# Patient Record
Sex: FEMALE | Race: WHITE | NOT HISPANIC OR LATINO | Employment: OTHER | URBAN - METROPOLITAN AREA
[De-identification: names, ages, dates, MRNs, and addresses within clinical notes are randomized per-mention and may not be internally consistent; named-entity substitution may affect disease eponyms.]

---

## 2021-11-29 ENCOUNTER — HOSPITAL ENCOUNTER (INPATIENT)
Facility: HOSPITAL | Age: 56
LOS: 18 days | Discharge: HOME | End: 2021-12-17
Attending: EMERGENCY MEDICINE | Admitting: INTERNAL MEDICINE

## 2021-11-29 ENCOUNTER — APPOINTMENT (OUTPATIENT)
Dept: GENERAL RADIOLOGY | Facility: HOSPITAL | Age: 56
End: 2021-11-29

## 2021-11-29 DIAGNOSIS — D64.9 ANEMIA, UNSPECIFIED TYPE: ICD-10-CM

## 2021-11-29 DIAGNOSIS — Z74.09 IMPAIRED MOBILITY AND ACTIVITIES OF DAILY LIVING: ICD-10-CM

## 2021-11-29 DIAGNOSIS — I10 UNCONTROLLED HYPERTENSION: ICD-10-CM

## 2021-11-29 DIAGNOSIS — R77.8 ELEVATED TROPONIN: ICD-10-CM

## 2021-11-29 DIAGNOSIS — R53.1 WEAKNESS: ICD-10-CM

## 2021-11-29 DIAGNOSIS — Z78.9 IMPAIRED MOBILITY AND ACTIVITIES OF DAILY LIVING: ICD-10-CM

## 2021-11-29 DIAGNOSIS — N17.9 ACUTE RENAL FAILURE, UNSPECIFIED ACUTE RENAL FAILURE TYPE (HCC): Primary | ICD-10-CM

## 2021-11-29 DIAGNOSIS — N18.6 ESRD (END STAGE RENAL DISEASE) (HCC): ICD-10-CM

## 2021-11-29 PROBLEM — E03.9 HYPOTHYROIDISM: Status: ACTIVE | Noted: 2021-11-29

## 2021-11-29 PROBLEM — M06.9 RHEUMATOID ARTHRITIS (HCC): Status: ACTIVE | Noted: 2021-11-29

## 2021-11-29 PROBLEM — E11.9 TYPE 2 DIABETES MELLITUS (HCC): Status: ACTIVE | Noted: 2021-11-29

## 2021-11-29 LAB
ALBUMIN SERPL-MCNC: 2.7 G/DL (ref 3.5–5.2)
ALBUMIN/GLOB SERPL: 1 G/DL
ALP SERPL-CCNC: 88 U/L (ref 39–117)
ALT SERPL W P-5'-P-CCNC: 12 U/L (ref 1–33)
ANION GAP SERPL CALCULATED.3IONS-SCNC: 10.9 MMOL/L (ref 5–15)
AST SERPL-CCNC: 23 U/L (ref 1–32)
BASOPHILS # BLD AUTO: 0.03 10*3/MM3 (ref 0–0.2)
BASOPHILS NFR BLD AUTO: 0.3 % (ref 0–1.5)
BILIRUB SERPL-MCNC: 0.4 MG/DL (ref 0–1.2)
BUN SERPL-MCNC: 40 MG/DL (ref 6–20)
BUN/CREAT SERPL: 9.5 (ref 7–25)
CALCIUM SPEC-SCNC: 7.9 MG/DL (ref 8.6–10.5)
CHLORIDE SERPL-SCNC: 97 MMOL/L (ref 98–107)
CO2 SERPL-SCNC: 24.1 MMOL/L (ref 22–29)
CREAT SERPL-MCNC: 4.22 MG/DL (ref 0.57–1)
DEPRECATED RDW RBC AUTO: 47.4 FL (ref 37–54)
EOSINOPHIL # BLD AUTO: 0.04 10*3/MM3 (ref 0–0.4)
EOSINOPHIL NFR BLD AUTO: 0.5 % (ref 0.3–6.2)
ERYTHROCYTE [DISTWIDTH] IN BLOOD BY AUTOMATED COUNT: 15.9 % (ref 12.3–15.4)
GFR SERPL CREATININE-BSD FRML MDRD: 11 ML/MIN/1.73
GFR SERPL CREATININE-BSD FRML MDRD: ABNORMAL ML/MIN/{1.73_M2}
GLOBULIN UR ELPH-MCNC: 2.7 GM/DL
GLUCOSE BLDC GLUCOMTR-MCNC: 285 MG/DL (ref 70–130)
GLUCOSE SERPL-MCNC: 292 MG/DL (ref 65–99)
HCT VFR BLD AUTO: 25.7 % (ref 34–46.6)
HGB BLD-MCNC: 8.7 G/DL (ref 12–15.9)
IMM GRANULOCYTES # BLD AUTO: 0.05 10*3/MM3 (ref 0–0.05)
IMM GRANULOCYTES NFR BLD AUTO: 0.6 % (ref 0–0.5)
LYMPHOCYTES # BLD AUTO: 1.13 10*3/MM3 (ref 0.7–3.1)
LYMPHOCYTES NFR BLD AUTO: 12.7 % (ref 19.6–45.3)
MCH RBC QN AUTO: 27.7 PG (ref 26.6–33)
MCHC RBC AUTO-ENTMCNC: 33.9 G/DL (ref 31.5–35.7)
MCV RBC AUTO: 81.8 FL (ref 79–97)
MONOCYTES # BLD AUTO: 0.35 10*3/MM3 (ref 0.1–0.9)
MONOCYTES NFR BLD AUTO: 3.9 % (ref 5–12)
NEUTROPHILS NFR BLD AUTO: 7.28 10*3/MM3 (ref 1.7–7)
NEUTROPHILS NFR BLD AUTO: 82 % (ref 42.7–76)
NRBC BLD AUTO-RTO: 0 /100 WBC (ref 0–0.2)
NT-PROBNP SERPL-MCNC: 5443 PG/ML (ref 0–900)
PLATELET # BLD AUTO: 167 10*3/MM3 (ref 140–450)
PMV BLD AUTO: 10.1 FL (ref 6–12)
POTASSIUM SERPL-SCNC: 5 MMOL/L (ref 3.5–5.2)
PROT SERPL-MCNC: 5.4 G/DL (ref 6–8.5)
RBC # BLD AUTO: 3.14 10*6/MM3 (ref 3.77–5.28)
SODIUM SERPL-SCNC: 132 MMOL/L (ref 136–145)
TROPONIN T SERPL-MCNC: 0.13 NG/ML (ref 0–0.03)
WBC NRBC COR # BLD: 8.88 10*3/MM3 (ref 3.4–10.8)

## 2021-11-29 PROCEDURE — 99285 EMERGENCY DEPT VISIT HI MDM: CPT

## 2021-11-29 PROCEDURE — 36415 COLL VENOUS BLD VENIPUNCTURE: CPT

## 2021-11-29 PROCEDURE — 83880 ASSAY OF NATRIURETIC PEPTIDE: CPT | Performed by: EMERGENCY MEDICINE

## 2021-11-29 PROCEDURE — 80053 COMPREHEN METABOLIC PANEL: CPT | Performed by: EMERGENCY MEDICINE

## 2021-11-29 PROCEDURE — 82962 GLUCOSE BLOOD TEST: CPT

## 2021-11-29 PROCEDURE — 85025 COMPLETE CBC W/AUTO DIFF WBC: CPT | Performed by: EMERGENCY MEDICINE

## 2021-11-29 PROCEDURE — 93005 ELECTROCARDIOGRAM TRACING: CPT | Performed by: EMERGENCY MEDICINE

## 2021-11-29 PROCEDURE — 84484 ASSAY OF TROPONIN QUANT: CPT | Performed by: EMERGENCY MEDICINE

## 2021-11-29 PROCEDURE — 93010 ELECTROCARDIOGRAM REPORT: CPT | Performed by: INTERNAL MEDICINE

## 2021-11-29 PROCEDURE — U0004 COV-19 TEST NON-CDC HGH THRU: HCPCS | Performed by: EMERGENCY MEDICINE

## 2021-11-29 PROCEDURE — 71045 X-RAY EXAM CHEST 1 VIEW: CPT

## 2021-11-29 RX ORDER — BLOOD SUGAR DIAGNOSTIC
1 STRIP MISCELLANEOUS 4 TIMES DAILY
COMMUNITY
Start: 2021-09-22 | End: 2022-01-15 | Stop reason: HOSPADM

## 2021-11-29 RX ORDER — ESCITALOPRAM OXALATE 20 MG/1
20 TABLET ORAL DAILY
Status: ON HOLD | COMMUNITY
End: 2021-12-10

## 2021-11-29 RX ORDER — BLOOD SUGAR DIAGNOSTIC
STRIP MISCELLANEOUS 4 TIMES DAILY
COMMUNITY
Start: 2021-09-10 | End: 2022-01-15 | Stop reason: HOSPADM

## 2021-11-29 RX ORDER — LABETALOL HYDROCHLORIDE 5 MG/ML
10 INJECTION, SOLUTION INTRAVENOUS ONCE
Status: COMPLETED | OUTPATIENT
Start: 2021-11-29 | End: 2021-11-29

## 2021-11-29 RX ORDER — LEVOTHYROXINE SODIUM 175 UG/1
175 TABLET ORAL DAILY
Status: ON HOLD | COMMUNITY
End: 2021-12-17 | Stop reason: SDUPTHER

## 2021-11-29 RX ORDER — SERTRALINE HYDROCHLORIDE 100 MG/1
100 TABLET, FILM COATED ORAL DAILY
Status: ON HOLD | COMMUNITY
End: 2021-12-17 | Stop reason: SDUPTHER

## 2021-11-29 RX ORDER — SODIUM CHLORIDE 0.9 % (FLUSH) 0.9 %
10 SYRINGE (ML) INJECTION AS NEEDED
Status: DISCONTINUED | OUTPATIENT
Start: 2021-11-29 | End: 2021-12-03

## 2021-11-29 RX ORDER — LOSARTAN POTASSIUM 50 MG/1
50 TABLET ORAL DAILY
COMMUNITY
Start: 2021-10-20 | End: 2021-12-17 | Stop reason: HOSPADM

## 2021-11-29 RX ORDER — TEMAZEPAM 7.5 MG/1
7.5 CAPSULE ORAL
COMMUNITY
End: 2021-12-17 | Stop reason: HOSPADM

## 2021-11-29 RX ORDER — ROPINIROLE 0.25 MG/1
0.75 TABLET, FILM COATED ORAL
COMMUNITY
Start: 2021-09-10 | End: 2022-09-13 | Stop reason: HOSPADM

## 2021-11-29 RX ORDER — ASPIRIN 81 MG/1
81 TABLET ORAL DAILY
Status: ON HOLD | COMMUNITY
End: 2022-07-01 | Stop reason: SDUPTHER

## 2021-11-29 RX ORDER — PANTOPRAZOLE SODIUM 40 MG/1
40 TABLET, DELAYED RELEASE ORAL DAILY
COMMUNITY
Start: 2021-08-25 | End: 2022-02-21

## 2021-11-29 RX ADMIN — LABETALOL HYDROCHLORIDE 10 MG: 5 INJECTION, SOLUTION INTRAVENOUS at 22:40

## 2021-11-30 ENCOUNTER — APPOINTMENT (OUTPATIENT)
Dept: ULTRASOUND IMAGING | Facility: HOSPITAL | Age: 56
End: 2021-11-30

## 2021-11-30 ENCOUNTER — APPOINTMENT (OUTPATIENT)
Dept: CARDIOLOGY | Facility: HOSPITAL | Age: 56
End: 2021-11-30

## 2021-11-30 PROBLEM — D64.9 ANEMIA: Status: ACTIVE | Noted: 2021-11-30

## 2021-11-30 PROBLEM — T78.3XXA ANGIOEDEMA: Status: ACTIVE | Noted: 2021-11-30

## 2021-11-30 PROBLEM — K21.9 GERD (GASTROESOPHAGEAL REFLUX DISEASE): Status: ACTIVE | Noted: 2021-11-30

## 2021-11-30 PROBLEM — I16.0 HYPERTENSIVE URGENCY: Status: ACTIVE | Noted: 2021-11-30

## 2021-11-30 PROBLEM — I16.0 HYPERTENSIVE URGENCY: Status: RESOLVED | Noted: 2021-11-30 | Resolved: 2021-11-30

## 2021-11-30 LAB
ABO GROUP BLD: NORMAL
ANION GAP SERPL CALCULATED.3IONS-SCNC: 8.8 MMOL/L (ref 5–15)
AORTIC DIMENSIONLESS INDEX: 0.8 (DI)
BH CV ECHO MEAS - AO MAX PG (FULL): 4.4 MMHG
BH CV ECHO MEAS - AO MAX PG: 7.7 MMHG
BH CV ECHO MEAS - AO MEAN PG (FULL): 1.8 MMHG
BH CV ECHO MEAS - AO MEAN PG: 3.6 MMHG
BH CV ECHO MEAS - AO V2 MAX: 138.5 CM/SEC
BH CV ECHO MEAS - AO V2 MEAN: 88.1 CM/SEC
BH CV ECHO MEAS - AO V2 VTI: 26.7 CM
BH CV ECHO MEAS - AVA(I,A): 1.7 CM^2
BH CV ECHO MEAS - AVA(I,D): 1.7 CM^2
BH CV ECHO MEAS - AVA(V,A): 1.4 CM^2
BH CV ECHO MEAS - AVA(V,D): 1.4 CM^2
BH CV ECHO MEAS - BSA(HAYCOCK): 1.6 M^2
BH CV ECHO MEAS - BSA: 1.6 M^2
BH CV ECHO MEAS - BZI_BMI: 23.8 KILOGRAMS/M^2
BH CV ECHO MEAS - BZI_METRIC_HEIGHT: 157.5 CM
BH CV ECHO MEAS - BZI_METRIC_WEIGHT: 59 KG
BH CV ECHO MEAS - EDV(CUBED): 45.6 ML
BH CV ECHO MEAS - EDV(MOD-SP2): 93 ML
BH CV ECHO MEAS - EDV(MOD-SP4): 79 ML
BH CV ECHO MEAS - EDV(TEICH): 53.4 ML
BH CV ECHO MEAS - EF(CUBED): 63.9 %
BH CV ECHO MEAS - EF(MOD-BP): 61.8 %
BH CV ECHO MEAS - EF(MOD-SP2): 60.2 %
BH CV ECHO MEAS - EF(MOD-SP4): 60.8 %
BH CV ECHO MEAS - EF(TEICH): 56.3 %
BH CV ECHO MEAS - ESV(CUBED): 16.5 ML
BH CV ECHO MEAS - ESV(MOD-SP2): 37 ML
BH CV ECHO MEAS - ESV(MOD-SP4): 31 ML
BH CV ECHO MEAS - ESV(TEICH): 23.3 ML
BH CV ECHO MEAS - FS: 28.8 %
BH CV ECHO MEAS - IVS/LVPW: 0.87
BH CV ECHO MEAS - IVSD: 0.93 CM
BH CV ECHO MEAS - LAT PEAK E' VEL: 4.2 CM/SEC
BH CV ECHO MEAS - LV DIASTOLIC VOL/BSA (35-75): 49.6 ML/M^2
BH CV ECHO MEAS - LV MASS(C)D: 106 GRAMS
BH CV ECHO MEAS - LV MASS(C)DI: 66.6 GRAMS/M^2
BH CV ECHO MEAS - LV MAX PG: 3.2 MMHG
BH CV ECHO MEAS - LV MEAN PG: 1.8 MMHG
BH CV ECHO MEAS - LV SYSTOLIC VOL/BSA (12-30): 19.5 ML/M^2
BH CV ECHO MEAS - LV V1 MAX: 90 CM/SEC
BH CV ECHO MEAS - LV V1 MEAN: 64 CM/SEC
BH CV ECHO MEAS - LV V1 VTI: 21.6 CM
BH CV ECHO MEAS - LVIDD: 3.6 CM
BH CV ECHO MEAS - LVIDS: 2.5 CM
BH CV ECHO MEAS - LVLD AP2: 7.2 CM
BH CV ECHO MEAS - LVLD AP4: 6.6 CM
BH CV ECHO MEAS - LVLS AP2: 6 CM
BH CV ECHO MEAS - LVLS AP4: 5.8 CM
BH CV ECHO MEAS - LVOT AREA (M): 2 CM^2
BH CV ECHO MEAS - LVOT AREA: 2.1 CM^2
BH CV ECHO MEAS - LVOT DIAM: 1.6 CM
BH CV ECHO MEAS - LVPWD: 1.1 CM
BH CV ECHO MEAS - MED PEAK E' VEL: 3.7 CM/SEC
BH CV ECHO MEAS - MV A DUR: 0.12 SEC
BH CV ECHO MEAS - MV A MAX VEL: 100.3 CM/SEC
BH CV ECHO MEAS - MV DEC SLOPE: 336.1 CM/SEC^2
BH CV ECHO MEAS - MV DEC TIME: 210 SEC
BH CV ECHO MEAS - MV E MAX VEL: 60.8 CM/SEC
BH CV ECHO MEAS - MV E/A: 0.61
BH CV ECHO MEAS - MV MAX PG: 4.5 MMHG
BH CV ECHO MEAS - MV MEAN PG: 1.6 MMHG
BH CV ECHO MEAS - MV P1/2T MAX VEL: 76 CM/SEC
BH CV ECHO MEAS - MV P1/2T: 66.2 MSEC
BH CV ECHO MEAS - MV V2 MAX: 105.9 CM/SEC
BH CV ECHO MEAS - MV V2 MEAN: 58.6 CM/SEC
BH CV ECHO MEAS - MV V2 VTI: 28.5 CM
BH CV ECHO MEAS - MVA P1/2T LCG: 2.9 CM^2
BH CV ECHO MEAS - MVA(P1/2T): 3.3 CM^2
BH CV ECHO MEAS - MVA(VTI): 1.6 CM^2
BH CV ECHO MEAS - PULM A REVS DUR: 0.14 SEC
BH CV ECHO MEAS - PULM A REVS VEL: 38.6 CM/SEC
BH CV ECHO MEAS - PULM DIAS VEL: 27.9 CM/SEC
BH CV ECHO MEAS - PULM S/D: 2.1
BH CV ECHO MEAS - PULM SYS VEL: 59.1 CM/SEC
BH CV ECHO MEAS - RAP SYSTOLE: 3 MMHG
BH CV ECHO MEAS - RVSP: 21.4 MMHG
BH CV ECHO MEAS - SI(CUBED): 18.3 ML/M^2
BH CV ECHO MEAS - SI(LVOT): 28.4 ML/M^2
BH CV ECHO MEAS - SI(MOD-SP2): 35.2 ML/M^2
BH CV ECHO MEAS - SI(MOD-SP4): 30.2 ML/M^2
BH CV ECHO MEAS - SI(TEICH): 18.9 ML/M^2
BH CV ECHO MEAS - SV(CUBED): 29.1 ML
BH CV ECHO MEAS - SV(LVOT): 45.1 ML
BH CV ECHO MEAS - SV(MOD-SP2): 56 ML
BH CV ECHO MEAS - SV(MOD-SP4): 48 ML
BH CV ECHO MEAS - SV(TEICH): 30.1 ML
BH CV ECHO MEAS - TAPSE (>1.6): 2.1 CM
BH CV ECHO MEAS - TR MAX VEL: 214.5 CM/SEC
BH CV ECHO MEASUREMENTS AVERAGE E/E' RATIO: 15.39
BH CV VAS BP RIGHT ARM: NORMAL MMHG
BH CV XLRA - RV BASE: 2.6 CM
BH CV XLRA - RV LENGTH: 6.9 CM
BH CV XLRA - RV MID: 1.3 CM
BH CV XLRA - TDI S': 9.8 CM/SEC
BLD GP AB SCN SERPL QL: NEGATIVE
BUN SERPL-MCNC: 41 MG/DL (ref 6–20)
BUN/CREAT SERPL: 10.2 (ref 7–25)
CALCIUM SPEC-SCNC: 7.6 MG/DL (ref 8.6–10.5)
CHLORIDE SERPL-SCNC: 100 MMOL/L (ref 98–107)
CO2 SERPL-SCNC: 23.2 MMOL/L (ref 22–29)
CREAT SERPL-MCNC: 4.02 MG/DL (ref 0.57–1)
CREAT UR-MCNC: 64.6 MG/DL
DEPRECATED RDW RBC AUTO: 48.4 FL (ref 37–54)
ERYTHROCYTE [DISTWIDTH] IN BLOOD BY AUTOMATED COUNT: 16.2 % (ref 12.3–15.4)
GFR SERPL CREATININE-BSD FRML MDRD: 12 ML/MIN/1.73
GFR SERPL CREATININE-BSD FRML MDRD: ABNORMAL ML/MIN/{1.73_M2}
GLUCOSE BLDC GLUCOMTR-MCNC: 108 MG/DL (ref 70–130)
GLUCOSE BLDC GLUCOMTR-MCNC: 141 MG/DL (ref 70–130)
GLUCOSE BLDC GLUCOMTR-MCNC: 168 MG/DL (ref 70–130)
GLUCOSE BLDC GLUCOMTR-MCNC: 264 MG/DL (ref 70–130)
GLUCOSE SERPL-MCNC: 293 MG/DL (ref 65–99)
HBA1C MFR BLD: 9.78 % (ref 4.8–5.6)
HCT VFR BLD AUTO: 20.6 % (ref 34–46.6)
HCT VFR BLD AUTO: 24.5 % (ref 34–46.6)
HEMOCCULT STL QL: NEGATIVE
HGB BLD-MCNC: 6.9 G/DL (ref 12–15.9)
HGB BLD-MCNC: 8.2 G/DL (ref 12–15.9)
IRON 24H UR-MRATE: 39 MCG/DL (ref 37–145)
IRON SATN MFR SERPL: 21 % (ref 20–50)
LEFT ATRIUM VOLUME INDEX: 39 ML/M2
MCH RBC QN AUTO: 27.5 PG (ref 26.6–33)
MCHC RBC AUTO-ENTMCNC: 33.5 G/DL (ref 31.5–35.7)
MCV RBC AUTO: 82.1 FL (ref 79–97)
PLATELET # BLD AUTO: 130 10*3/MM3 (ref 140–450)
PMV BLD AUTO: 9.7 FL (ref 6–12)
POTASSIUM SERPL-SCNC: 5 MMOL/L (ref 3.5–5.2)
PROT ?TM UR-MCNC: 1119 MG/DL
QT INTERVAL: 389 MS
RBC # BLD AUTO: 2.51 10*6/MM3 (ref 3.77–5.28)
RH BLD: POSITIVE
SARS-COV-2 ORF1AB RESP QL NAA+PROBE: NOT DETECTED
SODIUM SERPL-SCNC: 132 MMOL/L (ref 136–145)
SODIUM UR-SCNC: 67 MMOL/L
T&S EXPIRATION DATE: NORMAL
TIBC SERPL-MCNC: 185 MCG/DL (ref 298–536)
TRANSFERRIN SERPL-MCNC: 124 MG/DL (ref 200–360)
TROPONIN T SERPL-MCNC: 0.12 NG/ML (ref 0–0.03)
TSH SERPL DL<=0.05 MIU/L-ACNC: 152 UIU/ML (ref 0.27–4.2)
WBC NRBC COR # BLD: 8.49 10*3/MM3 (ref 3.4–10.8)

## 2021-11-30 PROCEDURE — 84443 ASSAY THYROID STIM HORMONE: CPT | Performed by: INTERNAL MEDICINE

## 2021-11-30 PROCEDURE — 84466 ASSAY OF TRANSFERRIN: CPT | Performed by: NURSE PRACTITIONER

## 2021-11-30 PROCEDURE — 86901 BLOOD TYPING SEROLOGIC RH(D): CPT | Performed by: HOSPITALIST

## 2021-11-30 PROCEDURE — 86850 RBC ANTIBODY SCREEN: CPT | Performed by: HOSPITALIST

## 2021-11-30 PROCEDURE — 82570 ASSAY OF URINE CREATININE: CPT | Performed by: INTERNAL MEDICINE

## 2021-11-30 PROCEDURE — 86900 BLOOD TYPING SEROLOGIC ABO: CPT | Performed by: HOSPITALIST

## 2021-11-30 PROCEDURE — 83540 ASSAY OF IRON: CPT | Performed by: NURSE PRACTITIONER

## 2021-11-30 PROCEDURE — 85018 HEMOGLOBIN: CPT | Performed by: INTERNAL MEDICINE

## 2021-11-30 PROCEDURE — 63710000001 INSULIN LISPRO (HUMAN) PER 5 UNITS: Performed by: NURSE PRACTITIONER

## 2021-11-30 PROCEDURE — 82272 OCCULT BLD FECES 1-3 TESTS: CPT | Performed by: INTERNAL MEDICINE

## 2021-11-30 PROCEDURE — 76775 US EXAM ABDO BACK WALL LIM: CPT

## 2021-11-30 PROCEDURE — 80048 BASIC METABOLIC PNL TOTAL CA: CPT | Performed by: NURSE PRACTITIONER

## 2021-11-30 PROCEDURE — 86901 BLOOD TYPING SEROLOGIC RH(D): CPT

## 2021-11-30 PROCEDURE — 99222 1ST HOSP IP/OBS MODERATE 55: CPT | Performed by: INTERNAL MEDICINE

## 2021-11-30 PROCEDURE — 93306 TTE W/DOPPLER COMPLETE: CPT

## 2021-11-30 PROCEDURE — 82962 GLUCOSE BLOOD TEST: CPT

## 2021-11-30 PROCEDURE — 93306 TTE W/DOPPLER COMPLETE: CPT | Performed by: INTERNAL MEDICINE

## 2021-11-30 PROCEDURE — P9016 RBC LEUKOCYTES REDUCED: HCPCS

## 2021-11-30 PROCEDURE — 83036 HEMOGLOBIN GLYCOSYLATED A1C: CPT | Performed by: NURSE PRACTITIONER

## 2021-11-30 PROCEDURE — 84484 ASSAY OF TROPONIN QUANT: CPT | Performed by: NURSE PRACTITIONER

## 2021-11-30 PROCEDURE — 84300 ASSAY OF URINE SODIUM: CPT | Performed by: INTERNAL MEDICINE

## 2021-11-30 PROCEDURE — 25010000002 DIPHENHYDRAMINE PER 50 MG: Performed by: NURSE PRACTITIONER

## 2021-11-30 PROCEDURE — 84156 ASSAY OF PROTEIN URINE: CPT | Performed by: INTERNAL MEDICINE

## 2021-11-30 PROCEDURE — 85014 HEMATOCRIT: CPT | Performed by: INTERNAL MEDICINE

## 2021-11-30 PROCEDURE — 36430 TRANSFUSION BLD/BLD COMPNT: CPT

## 2021-11-30 PROCEDURE — 86923 COMPATIBILITY TEST ELECTRIC: CPT

## 2021-11-30 PROCEDURE — 86900 BLOOD TYPING SEROLOGIC ABO: CPT

## 2021-11-30 PROCEDURE — 93356 MYOCRD STRAIN IMG SPCKL TRCK: CPT | Performed by: INTERNAL MEDICINE

## 2021-11-30 PROCEDURE — 93356 MYOCRD STRAIN IMG SPCKL TRCK: CPT

## 2021-11-30 PROCEDURE — 85027 COMPLETE CBC AUTOMATED: CPT | Performed by: NURSE PRACTITIONER

## 2021-11-30 RX ORDER — SERTRALINE HYDROCHLORIDE 100 MG/1
100 TABLET, FILM COATED ORAL DAILY
Status: DISCONTINUED | OUTPATIENT
Start: 2021-11-30 | End: 2021-12-07

## 2021-11-30 RX ORDER — LEVOTHYROXINE SODIUM 20 UG/ML
100 INJECTION, SOLUTION INTRAVENOUS DAILY
Status: DISCONTINUED | OUTPATIENT
Start: 2021-11-30 | End: 2021-12-03

## 2021-11-30 RX ORDER — DIPHENHYDRAMINE HYDROCHLORIDE 50 MG/ML
25 INJECTION INTRAMUSCULAR; INTRAVENOUS ONCE
Status: COMPLETED | OUTPATIENT
Start: 2021-11-30 | End: 2021-11-30

## 2021-11-30 RX ORDER — ASPIRIN 81 MG/1
81 TABLET, CHEWABLE ORAL DAILY
Status: DISCONTINUED | OUTPATIENT
Start: 2021-11-30 | End: 2021-12-17 | Stop reason: HOSPADM

## 2021-11-30 RX ORDER — ACETAMINOPHEN 325 MG/1
650 TABLET ORAL EVERY 4 HOURS PRN
Status: DISCONTINUED | OUTPATIENT
Start: 2021-11-30 | End: 2021-12-17 | Stop reason: HOSPADM

## 2021-11-30 RX ORDER — AMLODIPINE BESYLATE 5 MG/1
2.5 TABLET ORAL ONCE
Status: COMPLETED | OUTPATIENT
Start: 2021-11-30 | End: 2021-11-30

## 2021-11-30 RX ORDER — ACETAMINOPHEN 160 MG/5ML
650 SOLUTION ORAL EVERY 4 HOURS PRN
Status: DISCONTINUED | OUTPATIENT
Start: 2021-11-30 | End: 2021-12-01

## 2021-11-30 RX ORDER — SODIUM CHLORIDE 0.9 % (FLUSH) 0.9 %
10 SYRINGE (ML) INJECTION EVERY 12 HOURS SCHEDULED
Status: DISCONTINUED | OUTPATIENT
Start: 2021-11-30 | End: 2021-12-17 | Stop reason: HOSPADM

## 2021-11-30 RX ORDER — ESCITALOPRAM OXALATE 20 MG/1
20 TABLET ORAL DAILY
Status: DISCONTINUED | OUTPATIENT
Start: 2021-11-30 | End: 2021-12-07

## 2021-11-30 RX ORDER — CLONIDINE HYDROCHLORIDE 0.1 MG/1
0.1 TABLET ORAL ONCE
Status: COMPLETED | OUTPATIENT
Start: 2021-11-30 | End: 2021-11-30

## 2021-11-30 RX ORDER — PANTOPRAZOLE SODIUM 40 MG/1
40 TABLET, DELAYED RELEASE ORAL DAILY
Status: DISCONTINUED | OUTPATIENT
Start: 2021-11-30 | End: 2021-12-17 | Stop reason: HOSPADM

## 2021-11-30 RX ORDER — NITROGLYCERIN 0.4 MG/1
0.4 TABLET SUBLINGUAL
Status: DISCONTINUED | OUTPATIENT
Start: 2021-11-30 | End: 2021-12-17 | Stop reason: HOSPADM

## 2021-11-30 RX ORDER — SODIUM CHLORIDE 0.9 % (FLUSH) 0.9 %
10 SYRINGE (ML) INJECTION AS NEEDED
Status: DISCONTINUED | OUTPATIENT
Start: 2021-11-30 | End: 2021-12-17 | Stop reason: HOSPADM

## 2021-11-30 RX ORDER — PROCHLORPERAZINE EDISYLATE 5 MG/ML
5 INJECTION INTRAMUSCULAR; INTRAVENOUS EVERY 6 HOURS PRN
Status: DISCONTINUED | OUTPATIENT
Start: 2021-11-30 | End: 2021-12-17 | Stop reason: HOSPADM

## 2021-11-30 RX ORDER — LABETALOL HYDROCHLORIDE 5 MG/ML
10 INJECTION, SOLUTION INTRAVENOUS EVERY 6 HOURS PRN
Status: DISCONTINUED | OUTPATIENT
Start: 2021-11-30 | End: 2021-12-17 | Stop reason: HOSPADM

## 2021-11-30 RX ORDER — AMLODIPINE BESYLATE 5 MG/1
5 TABLET ORAL
Status: DISCONTINUED | OUTPATIENT
Start: 2021-12-01 | End: 2021-12-02

## 2021-11-30 RX ORDER — LEVOTHYROXINE SODIUM 0.05 MG/1
175 TABLET ORAL DAILY
Status: DISCONTINUED | OUTPATIENT
Start: 2021-11-30 | End: 2021-11-30

## 2021-11-30 RX ORDER — CALCIUM CARBONATE 200(500)MG
2 TABLET,CHEWABLE ORAL 2 TIMES DAILY PRN
Status: DISCONTINUED | OUTPATIENT
Start: 2021-11-30 | End: 2021-12-01

## 2021-11-30 RX ORDER — DEXTROSE MONOHYDRATE 25 G/50ML
25 INJECTION, SOLUTION INTRAVENOUS
Status: DISCONTINUED | OUTPATIENT
Start: 2021-11-30 | End: 2021-12-17 | Stop reason: HOSPADM

## 2021-11-30 RX ORDER — AMLODIPINE BESYLATE 5 MG/1
2.5 TABLET ORAL
Status: DISCONTINUED | OUTPATIENT
Start: 2021-11-30 | End: 2021-11-30

## 2021-11-30 RX ORDER — LEVOTHYROXINE SODIUM 20 UG/ML
100 INJECTION, SOLUTION INTRAVENOUS
Status: DISCONTINUED | OUTPATIENT
Start: 2021-11-30 | End: 2021-11-30

## 2021-11-30 RX ORDER — NICOTINE POLACRILEX 4 MG
15 LOZENGE BUCCAL
Status: DISCONTINUED | OUTPATIENT
Start: 2021-11-30 | End: 2021-12-17 | Stop reason: HOSPADM

## 2021-11-30 RX ORDER — ACETAMINOPHEN 650 MG/1
650 SUPPOSITORY RECTAL EVERY 4 HOURS PRN
Status: DISCONTINUED | OUTPATIENT
Start: 2021-11-30 | End: 2021-12-01

## 2021-11-30 RX ORDER — FAMOTIDINE 10 MG/ML
20 INJECTION, SOLUTION INTRAVENOUS ONCE
Status: COMPLETED | OUTPATIENT
Start: 2021-11-30 | End: 2021-11-30

## 2021-11-30 RX ORDER — SODIUM CHLORIDE 9 MG/ML
75 INJECTION, SOLUTION INTRAVENOUS CONTINUOUS
Status: DISCONTINUED | OUTPATIENT
Start: 2021-11-30 | End: 2021-12-05

## 2021-11-30 RX ORDER — ROPINIROLE 0.5 MG/1
0.75 TABLET, FILM COATED ORAL NIGHTLY
Status: DISCONTINUED | OUTPATIENT
Start: 2021-11-30 | End: 2021-12-17 | Stop reason: HOSPADM

## 2021-11-30 RX ORDER — INSULIN LISPRO 100 [IU]/ML
0-9 INJECTION, SOLUTION INTRAVENOUS; SUBCUTANEOUS
Status: DISCONTINUED | OUTPATIENT
Start: 2021-11-30 | End: 2021-12-03

## 2021-11-30 RX ADMIN — ASPIRIN 81 MG: 81 TABLET, CHEWABLE ORAL at 09:31

## 2021-11-30 RX ADMIN — SODIUM CHLORIDE, PRESERVATIVE FREE 10 ML: 5 INJECTION INTRAVENOUS at 21:53

## 2021-11-30 RX ADMIN — SODIUM CHLORIDE 100 ML/HR: 9 INJECTION, SOLUTION INTRAVENOUS at 18:51

## 2021-11-30 RX ADMIN — SODIUM CHLORIDE, PRESERVATIVE FREE 10 ML: 5 INJECTION INTRAVENOUS at 05:51

## 2021-11-30 RX ADMIN — SODIUM CHLORIDE 100 ML/HR: 9 INJECTION, SOLUTION INTRAVENOUS at 02:30

## 2021-11-30 RX ADMIN — LEVOTHYROXINE SODIUM 100 MCG: 20 INJECTION, SOLUTION INTRAVENOUS at 21:53

## 2021-11-30 RX ADMIN — ESCITALOPRAM 20 MG: 20 TABLET, FILM COATED ORAL at 09:30

## 2021-11-30 RX ADMIN — LEVOTHYROXINE SODIUM 175 MCG: 0.17 TABLET ORAL at 09:30

## 2021-11-30 RX ADMIN — INSULIN LISPRO 2 UNITS: 100 INJECTION, SOLUTION INTRAVENOUS; SUBCUTANEOUS at 21:52

## 2021-11-30 RX ADMIN — PANTOPRAZOLE SODIUM 40 MG: 40 TABLET, DELAYED RELEASE ORAL at 09:30

## 2021-11-30 RX ADMIN — FAMOTIDINE 20 MG: 10 INJECTION INTRAVENOUS at 04:10

## 2021-11-30 RX ADMIN — METOPROLOL TARTRATE 25 MG: 25 TABLET, FILM COATED ORAL at 13:16

## 2021-11-30 RX ADMIN — DIPHENHYDRAMINE HYDROCHLORIDE 25 MG: 50 INJECTION, SOLUTION INTRAMUSCULAR; INTRAVENOUS at 04:10

## 2021-11-30 RX ADMIN — SERTRALINE 100 MG: 100 TABLET, FILM COATED ORAL at 09:30

## 2021-11-30 RX ADMIN — SODIUM CHLORIDE, PRESERVATIVE FREE 10 ML: 5 INJECTION INTRAVENOUS at 09:30

## 2021-11-30 RX ADMIN — AMLODIPINE BESYLATE 2.5 MG: 5 TABLET ORAL at 13:16

## 2021-11-30 RX ADMIN — METOPROLOL TARTRATE 25 MG: 25 TABLET, FILM COATED ORAL at 21:52

## 2021-11-30 RX ADMIN — CLONIDINE HYDROCHLORIDE 0.1 MG: 0.1 TABLET ORAL at 04:11

## 2021-11-30 RX ADMIN — ROPINIROLE HYDROCHLORIDE 0.75 MG: 0.5 TABLET, FILM COATED ORAL at 21:52

## 2021-11-30 RX ADMIN — LABETALOL HYDROCHLORIDE 10 MG: 5 INJECTION, SOLUTION INTRAVENOUS at 02:01

## 2021-11-30 RX ADMIN — INSULIN LISPRO 6 UNITS: 100 INJECTION, SOLUTION INTRAVENOUS; SUBCUTANEOUS at 09:29

## 2021-12-01 ENCOUNTER — APPOINTMENT (OUTPATIENT)
Dept: MRI IMAGING | Facility: HOSPITAL | Age: 56
End: 2021-12-01

## 2021-12-01 PROBLEM — E11.65 TYPE 2 DIABETES MELLITUS WITH HYPERGLYCEMIA, WITH LONG-TERM CURRENT USE OF INSULIN: Status: ACTIVE | Noted: 2021-11-29

## 2021-12-01 PROBLEM — Z91.199 MEDICALLY NONCOMPLIANT: Status: ACTIVE | Noted: 2021-12-01

## 2021-12-01 PROBLEM — Z79.4 TYPE 2 DIABETES MELLITUS WITH HYPERGLYCEMIA, WITH LONG-TERM CURRENT USE OF INSULIN: Status: ACTIVE | Noted: 2021-11-29

## 2021-12-01 PROBLEM — I21.A1 MYOCARDIAL INFARCTION DUE TO DEMAND ISCHEMIA (HCC): Status: ACTIVE | Noted: 2021-12-01

## 2021-12-01 LAB
ANION GAP SERPL CALCULATED.3IONS-SCNC: 8.4 MMOL/L (ref 5–15)
BASOPHILS # BLD AUTO: 0.07 10*3/MM3 (ref 0–0.2)
BASOPHILS NFR BLD AUTO: 0.6 % (ref 0–1.5)
BH BB BLOOD EXPIRATION DATE: NORMAL
BH BB BLOOD TYPE BARCODE: 5100
BH BB DISPENSE STATUS: NORMAL
BH BB PRODUCT CODE: NORMAL
BH BB UNIT NUMBER: NORMAL
BUN SERPL-MCNC: 41 MG/DL (ref 6–20)
BUN/CREAT SERPL: 10.9 (ref 7–25)
CALCIUM SPEC-SCNC: 7.8 MG/DL (ref 8.6–10.5)
CHLORIDE SERPL-SCNC: 101 MMOL/L (ref 98–107)
CO2 SERPL-SCNC: 22.6 MMOL/L (ref 22–29)
CREAT SERPL-MCNC: 3.76 MG/DL (ref 0.57–1)
CROSSMATCH INTERPRETATION: NORMAL
DEPRECATED RDW RBC AUTO: 51 FL (ref 37–54)
EOSINOPHIL # BLD AUTO: 0.2 10*3/MM3 (ref 0–0.4)
EOSINOPHIL NFR BLD AUTO: 1.6 % (ref 0.3–6.2)
ERYTHROCYTE [DISTWIDTH] IN BLOOD BY AUTOMATED COUNT: 16.4 % (ref 12.3–15.4)
GFR SERPL CREATININE-BSD FRML MDRD: 12 ML/MIN/1.73
GFR SERPL CREATININE-BSD FRML MDRD: ABNORMAL ML/MIN/{1.73_M2}
GLUCOSE BLDC GLUCOMTR-MCNC: 129 MG/DL (ref 70–130)
GLUCOSE BLDC GLUCOMTR-MCNC: 148 MG/DL (ref 70–130)
GLUCOSE BLDC GLUCOMTR-MCNC: 149 MG/DL (ref 70–130)
GLUCOSE BLDC GLUCOMTR-MCNC: 153 MG/DL (ref 70–130)
GLUCOSE SERPL-MCNC: 100 MG/DL (ref 65–99)
HCT VFR BLD AUTO: 29.9 % (ref 34–46.6)
HGB BLD-MCNC: 10 G/DL (ref 12–15.9)
IMM GRANULOCYTES # BLD AUTO: 0.12 10*3/MM3 (ref 0–0.05)
IMM GRANULOCYTES NFR BLD AUTO: 1 % (ref 0–0.5)
LYMPHOCYTES # BLD AUTO: 3.28 10*3/MM3 (ref 0.7–3.1)
LYMPHOCYTES NFR BLD AUTO: 27 % (ref 19.6–45.3)
MCH RBC QN AUTO: 28.2 PG (ref 26.6–33)
MCHC RBC AUTO-ENTMCNC: 33.4 G/DL (ref 31.5–35.7)
MCV RBC AUTO: 84.5 FL (ref 79–97)
MONOCYTES # BLD AUTO: 0.9 10*3/MM3 (ref 0.1–0.9)
MONOCYTES NFR BLD AUTO: 7.4 % (ref 5–12)
NEUTROPHILS NFR BLD AUTO: 62.4 % (ref 42.7–76)
NEUTROPHILS NFR BLD AUTO: 7.59 10*3/MM3 (ref 1.7–7)
NRBC BLD AUTO-RTO: 0 /100 WBC (ref 0–0.2)
PLATELET # BLD AUTO: 184 10*3/MM3 (ref 140–450)
PMV BLD AUTO: 9.8 FL (ref 6–12)
POTASSIUM SERPL-SCNC: 4.5 MMOL/L (ref 3.5–5.2)
RBC # BLD AUTO: 3.54 10*6/MM3 (ref 3.77–5.28)
SODIUM SERPL-SCNC: 132 MMOL/L (ref 136–145)
UNIT  ABO: NORMAL
UNIT  RH: NORMAL
WBC NRBC COR # BLD: 12.16 10*3/MM3 (ref 3.4–10.8)

## 2021-12-01 PROCEDURE — 63710000001 INSULIN GLARGINE PER 5 UNITS: Performed by: HOSPITALIST

## 2021-12-01 PROCEDURE — 85025 COMPLETE CBC W/AUTO DIFF WBC: CPT | Performed by: INTERNAL MEDICINE

## 2021-12-01 PROCEDURE — 25010000002 DIAZEPAM PER 5 MG: Performed by: PSYCHIATRY & NEUROLOGY

## 2021-12-01 PROCEDURE — 80048 BASIC METABOLIC PNL TOTAL CA: CPT | Performed by: INTERNAL MEDICINE

## 2021-12-01 PROCEDURE — 82962 GLUCOSE BLOOD TEST: CPT

## 2021-12-01 PROCEDURE — 92610 EVALUATE SWALLOWING FUNCTION: CPT | Performed by: SPEECH-LANGUAGE PATHOLOGIST

## 2021-12-01 PROCEDURE — 94799 UNLISTED PULMONARY SVC/PX: CPT

## 2021-12-01 PROCEDURE — 63710000001 INSULIN LISPRO (HUMAN) PER 5 UNITS: Performed by: NURSE PRACTITIONER

## 2021-12-01 PROCEDURE — 99222 1ST HOSP IP/OBS MODERATE 55: CPT | Performed by: PSYCHIATRY & NEUROLOGY

## 2021-12-01 PROCEDURE — 99232 SBSQ HOSP IP/OBS MODERATE 35: CPT | Performed by: INTERNAL MEDICINE

## 2021-12-01 RX ORDER — DIAZEPAM 5 MG/ML
5 INJECTION, SOLUTION INTRAMUSCULAR; INTRAVENOUS ONCE
Status: COMPLETED | OUTPATIENT
Start: 2021-12-01 | End: 2021-12-01

## 2021-12-01 RX ORDER — HYDRALAZINE HYDROCHLORIDE 25 MG/1
25 TABLET, FILM COATED ORAL EVERY 8 HOURS SCHEDULED
Status: DISCONTINUED | OUTPATIENT
Start: 2021-12-01 | End: 2021-12-02

## 2021-12-01 RX ORDER — INSULIN GLARGINE 100 [IU]/ML
10 INJECTION, SOLUTION SUBCUTANEOUS EVERY MORNING
Status: DISCONTINUED | OUTPATIENT
Start: 2021-12-01 | End: 2021-12-03

## 2021-12-01 RX ADMIN — SODIUM CHLORIDE, PRESERVATIVE FREE 10 ML: 5 INJECTION INTRAVENOUS at 08:58

## 2021-12-01 RX ADMIN — ESCITALOPRAM 20 MG: 20 TABLET, FILM COATED ORAL at 08:58

## 2021-12-01 RX ADMIN — HYDRALAZINE HYDROCHLORIDE 25 MG: 10 TABLET, FILM COATED ORAL at 13:06

## 2021-12-01 RX ADMIN — METOPROLOL TARTRATE 25 MG: 25 TABLET, FILM COATED ORAL at 22:22

## 2021-12-01 RX ADMIN — INSULIN GLARGINE 10 UNITS: 100 INJECTION, SOLUTION SUBCUTANEOUS at 16:12

## 2021-12-01 RX ADMIN — PANTOPRAZOLE SODIUM 40 MG: 40 TABLET, DELAYED RELEASE ORAL at 08:58

## 2021-12-01 RX ADMIN — METOPROLOL TARTRATE 25 MG: 25 TABLET, FILM COATED ORAL at 08:58

## 2021-12-01 RX ADMIN — ASPIRIN 81 MG: 81 TABLET, CHEWABLE ORAL at 08:58

## 2021-12-01 RX ADMIN — AMLODIPINE BESYLATE 5 MG: 5 TABLET ORAL at 08:58

## 2021-12-01 RX ADMIN — SERTRALINE 100 MG: 100 TABLET, FILM COATED ORAL at 08:58

## 2021-12-01 RX ADMIN — SODIUM CHLORIDE, PRESERVATIVE FREE 10 ML: 5 INJECTION INTRAVENOUS at 22:36

## 2021-12-01 RX ADMIN — LABETALOL HYDROCHLORIDE 10 MG: 5 INJECTION, SOLUTION INTRAVENOUS at 13:40

## 2021-12-01 RX ADMIN — DIAZEPAM 5 MG: 5 INJECTION, SOLUTION INTRAMUSCULAR; INTRAVENOUS at 18:46

## 2021-12-01 RX ADMIN — INSULIN LISPRO 2 UNITS: 100 INJECTION, SOLUTION INTRAVENOUS; SUBCUTANEOUS at 13:06

## 2021-12-01 RX ADMIN — LEVOTHYROXINE SODIUM 100 MCG: 20 INJECTION, SOLUTION INTRAVENOUS at 08:58

## 2021-12-01 RX ADMIN — HYDRALAZINE HYDROCHLORIDE 25 MG: 10 TABLET, FILM COATED ORAL at 22:22

## 2021-12-01 NOTE — PLAN OF CARE
Goal Outcome Evaluation:  Plan of Care Reviewed With: patient           Outcome Summary: Clinical swallow evaluation completed recommend regular solids with thin liquids, meds whole with thins and small bites and sips.

## 2021-12-01 NOTE — THERAPY EVALUATION
Acute Care - Speech Language Pathology   Swallow Initial Evaluation [facility]     Patient Name: Zaina Martinez  : 1965  MRN: [id number]  Today's Date: 2021               Admit Date: 2021    Visit Dx:     ICD-10-CM ICD-9-CM   1. Acute renal failure, unspecified acute renal failure type (HCC)  N17.9 584.9   2. Uncontrolled hypertension  I10 401.9   3. Anemia, unspecified type  D64.9 285.9   4. Elevated troponin  R77.8 790.6     Patient Active Problem List   Diagnosis   • MAYUR (acute kidney injury) (HCC)   • HTN (hypertension)   • Hypothyroidism   • Type 2 diabetes mellitus with hyperglycemia, with long-term current use of insulin (HCC)   • Rheumatoid arthritis (HCC)   • Angioedema   • GERD (gastroesophageal reflux disease)   • Anemia   • Medically noncompliant   • Myocardial infarction due to demand ischemia (HCC)     Past Medical History:   Diagnosis Date   • Diabetes (HCC)    • Disease of thyroid gland    • GERD (gastroesophageal reflux disease)    • Hypertension    • Rheumatoid arthritis (HCC)      Past Surgical History:   Procedure Laterality Date   • EYE SURGERY     • HYSTERECTOMY         SLP Recommendation and Plan  SLP Swallowing Diagnosis: swallow WFL (21)  SLP Diet Recommendation: regular textures (21)  Recommended Precautions and Strategies: upright posture during/after eating, small bites of food and sips of liquid (21)  SLP Rec. for Method of Medication Administration: meds whole, with thin liquids, with pudding or applesauce, as tolerated (21)     Monitor for Signs of Aspiration: yes, notify SLP if any concerns (21)     Swallow Criteria for Skilled Therapeutic Interventions Met: no problems identified which require skilled intervention (21)  Anticipated Discharge Disposition (SLP): unknown (21)     Therapy Frequency (Swallow): evaluation only (21)                Patient was not wearing a face mask  during this therapy encounter. Therapist used appropriate personal protective equipment including mask, eye protection and gloves.  Mask used was standard procedure mask. Appropriate PPE was worn during the entire therapy session. Hand hygiene was completed before and after therapy session. Patient is not in enhanced droplet precautions.                       Plan of Care Reviewed With: patient  Outcome Summary: Clinical swallow evaluation completed recommend regular solids with thin liquids, meds whole with thins and small bites and sips.      SWALLOW EVALUATION (last 72 hours)     SLP Adult Swallow Evaluation     Row Name 12/01/21 1400                   Rehab Evaluation    Document Type evaluation  -KA        Subjective Information no complaints  -KA        Patient Observations alert; cooperative  -KA        Patient Effort good  -KA        Symptoms Noted During/After Treatment none  -KA                  General Information    Patient Profile Reviewed yes  -KA        Pertinent History Of Current Problem severe hypothyroidism, facial swelling.  -KA        Current Method of Nutrition full liquids  -KA        Precautions/Limitations, Vision WFL  -KA        Precautions/Limitations, Hearing WFL  -KA        Prior Level of Function-Communication WFL  -KA        Prior Level of Function-Swallowing no diet consistency restrictions; safe, efficient swallowing in all situations  -KA        Plans/Goals Discussed with patient  -KA        Barriers to Rehab none identified  -KA        Patient's Goals for Discharge return home  -KA                  Pain    Additional Documentation Pain Scale: Numbers Pre/Post-Treatment (Group)  -KA                  Pain Scale: Numbers Pre/Post-Treatment    Pretreatment Pain Rating 0/10 - no pain  -KA        Posttreatment Pain Rating 0/10 - no pain  -KA                  Oral Motor Structure and Function    Dentition Assessment natural, present and adequate  -KA        Secretion Management WNL/WFL   -KA        Mucosal Quality moist, healthy  -KA        Volitional Swallow WFL  -KA        Volitional Cough WFL  -KA                  Oral Musculature and Cranial Nerve Assessment    Oral Motor General Assessment WFL  -KA                  General Eating/Swallowing Observations    Respiratory Support Currently in Use room air  -KA        Eating/Swallowing Skills self-fed  -KA        Positioning During Eating upright in bed  -KA        Utensils Used spoon; cup; straw  -KA        Consistencies Trialed regular textures; soft textures; chopped; mixed consistency; pureed; thin liquids  -KA                  Clinical Swallow Eval    Clinical Swallow Evaluation Summary No overt s/s of pen/asp with all tested consistencies, mastication functional and laryngeal elevation adequate. Patient did demonstrate word finding difficulty at times and RN was notifed, RN reported pt is intermtitently confused.  -KA                  Clinical Impression    SLP Swallowing Diagnosis swallow WFL  -KA        Functional Impact no impact on function  -KA        Swallow Criteria for Skilled Therapeutic Interventions Met no problems identified which require skilled intervention  -KA                  SLP Treatment Clinical Impressions    Care Plan Review care plan/treatment goals reviewed  -KA                  Recommendations    Therapy Frequency (Swallow) evaluation only  -        SLP Diet Recommendation regular textures  -KA        Recommended Precautions and Strategies upright posture during/after eating; small bites of food and sips of liquid  -KA        Oral Care Recommendations Oral Care BID/PRN  -KA        SLP Rec. for Method of Medication Administration meds whole; with thin liquids; with pudding or applesauce; as tolerated  -KA        Monitor for Signs of Aspiration yes; notify SLP if any concerns  -KA        Anticipated Discharge Disposition (SLP) unknown  -KA              User Key  (r) = Recorded By, (t) = Taken By, (c) = Cosigned By     Initials Name Effective Dates    Gabriel Joyce MA,CCC-SLP 06/16/21 -                 EDUCATION  The patient has been educated in the following areas:   Dysphagia (Swallowing Impairment).              Time Calculation:    Time Calculation- SLP     Row Name 12/01/21 1453             Time Calculation- SLP    SLP Start Time 1400  -KA      SLP Received On 12/01/21  -KA              Untimed Charges    SLP Eval/Re-eval  ST Eval Oral Pharyng Swallow - [id number]  -KA      [id number] Eval Oral Pharyng Swallow Minutes 45  -KA              Total Minutes    Untimed Charges Total Minutes 45  -KA       Total Minutes 45  -KA            User Key  (r) = Recorded By, (t) = Taken By, (c) = Cosigned By    Initials Name Provider Type    Gabriel Joyce MA,CCC-SLP Speech and Language Pathologist                Therapy Charges for Today     Code Description Service Date Service Provider Modifiers Qty    [id number]  ST EVAL ORAL PHARYNG SWALLOW 3 12/1/2021 Gabriel Nugent MA,CCC-SLP GN 1               Gabriel Nugent MA,CCC-SLP  12/1/2021

## 2021-12-01 NOTE — PROGRESS NOTES
"   LOS: 2 days   Patient Care Team:  Provider, No Known as PCP - General      Subjective   Interval History: resting  CT A/P pending    Objective         Vital Signs  Temp:  [98.2 °F (36.8 °C)-98.3 °F (36.8 °C)] 98.2 °F (36.8 °C)  Heart Rate:  [51-70] 51  Resp:  [18-20] 18  BP: (150-184)/(82-92) 150/84      Intake/Output Summary (Last 24 hours) at 12/1/2021 0753  Last data filed at 11/30/2021 2030  Gross per 24 hour   Intake 363.75 ml   Output 300 ml   Net 63.75 ml       Flowsheet Rows      First Filed Value   Admission Height 157.5 cm (62\") Documented at 11/29/2021 2011   Admission Weight 59 kg (130 lb) Documented at 11/30/2021 0200          Physical Exam:     General appearance: alert, cooperative, oriented  Lungs:  Clear, No acute distress  Heart:  Regular, rate and rhythm  ABD:  Soft  nt  Genitalia:  normal  Extremities: normal, no edema  Skin:  Skin color, texture, turgor normal, no rashes        Results Review:      Lab Results (all)     Procedure Component Value Units Date/Time    Basic Metabolic Panel [[id number]]  (Abnormal) Collected: 12/01/21 0636    Specimen: Blood Updated: 12/01/21 0713     Glucose 100 mg/dL      BUN 41 mg/dL      Creatinine 3.76 mg/dL      Sodium 132 mmol/L      Potassium 4.5 mmol/L      Chloride 101 mmol/L      CO2 22.6 mmol/L      Calcium 7.8 mg/dL      eGFR   Amer --     Comment: <15 Indicative of kidney failure.        eGFR Non African Amer 12 mL/min/1.73      Comment: <15 Indicative of kidney failure.        BUN/Creatinine Ratio 10.9     Anion Gap 8.4 mmol/L     Narrative:      GFR Normal >60  Chronic Kidney Disease <60  Kidney Failure <15      CBC & Differential [[id number]]  (Abnormal) Collected: 12/01/21 0636    Specimen: Blood Updated: 12/01/21 0659    Narrative:      The following orders were created for panel order CBC & Differential.  Procedure                               Abnormality         Status                     ---------                               " -----------         ------                     CBC Auto Differential[[id number]]        Abnormal            Final result                 Please view results for these tests on the individual orders.    CBC Auto Differential [[id number]]  (Abnormal) Collected: 12/01/21 0636    Specimen: Blood Updated: 12/01/21 0659     WBC 12.16 10*3/mm3      RBC 3.54 10*6/mm3      Hemoglobin 10.0 g/dL      Hematocrit 29.9 %      MCV 84.5 fL      MCH 28.2 pg      MCHC 33.4 g/dL      RDW 16.4 %      RDW-SD 51.0 fl      MPV 9.8 fL      Platelets 184 10*3/mm3      Neutrophil % 62.4 %      Lymphocyte % 27.0 %      Monocyte % 7.4 %      Eosinophil % 1.6 %      Basophil % 0.6 %      Immature Grans % 1.0 %      Neutrophils, Absolute 7.59 10*3/mm3      Lymphocytes, Absolute 3.28 10*3/mm3      Monocytes, Absolute 0.90 10*3/mm3      Eosinophils, Absolute 0.20 10*3/mm3      Basophils, Absolute 0.07 10*3/mm3      Immature Grans, Absolute 0.12 10*3/mm3      nRBC 0.0 /100 WBC     POC Glucose Once [[id number]]  (Abnormal) Collected: 11/30/21 2136    Specimen: Blood Updated: 11/30/21 2137     Glucose 168 mg/dL      Comment: Meter: [id number] : [id number] Desmond Iqbal RN       Occult Blood X 1, Stool - Stool, Per Rectum [[id number]]  (Normal) Collected: 11/30/21 2037    Specimen: Stool from Per Rectum Updated: 11/30/21 2108     Fecal Occult Blood Negative    Protein, Urine, Random - [[id number]] Collected: 11/30/21 1158    Specimen: Urine Updated: 11/30/21 1811     Total Protein, Urine 1,119.0 mg/dL     Narrative:      Reference intervals for random urine have not been established.  Clinical usage is dependent upon physician's interpretation in combination with other laboratory tests.       POC Glucose Once [[id number]]  (Normal) Collected: 11/30/21 1606    Specimen: Blood Updated: 11/30/21 1608     Glucose 108 mg/dL      Comment: Meter: [id number] : [id number] Johann SAAVEDRA       Hemoglobin & Hematocrit, Blood [[id number]]  (Abnormal)  Collected: 11/30/21 1525    Specimen: Blood Updated: 11/30/21 1551     Hemoglobin 8.2 g/dL      Hematocrit 24.5 %     POC Glucose Once [[id number]]  (Abnormal) Collected: 11/30/21 1313    Specimen: Blood Updated: 11/30/21 1315     Glucose 141 mg/dL      Comment: Meter: [id number] : [id number] Johann Tiffanie QUENTIN       Sodium, Urine, Random - [[id number]] Collected: 11/30/21 1158    Specimen: Urine Updated: 11/30/21 1301     Sodium, Urine 67 mmol/L     Narrative:      Reference intervals for random urine have not been established.  Clinical usage is dependent upon physician's interpretation in combination with other laboratory tests.       Creatinine, Urine, Random - [[id number]] Collected: 11/30/21 1158    Specimen: Urine Updated: 11/30/21 1301     Creatinine, Urine 64.6 mg/dL     Narrative:      Reference intervals for random urine have not been established.  Clinical usage is dependent upon physician's interpretation in combination with other laboratory tests.       TSH [[id number]]  (Abnormal) Collected: 11/30/21 0552    Specimen: Blood Updated: 11/30/21 1244     .000 uIU/mL     POC Glucose Once [[id number]]  (Abnormal) Collected: 11/30/21 0806    Specimen: Blood Updated: 11/30/21 0808     Glucose 264 mg/dL      Comment: Meter: [id number] : [id number] Milind Jordan RN       Hemoglobin A1c [[id number]]  (Abnormal) Collected: 11/30/21 0552    Specimen: Blood Updated: 11/30/21 0630     Hemoglobin A1C 9.78 %     Narrative:      Hemoglobin A1C Ranges:    Increased Risk for Diabetes  5.7% to 6.4%  Diabetes                     >= 6.5%  Diabetic Goal                < 7.0%    CBC (No Diff) [[id number]]  (Abnormal) Collected: 11/30/21 0552    Specimen: Blood Updated: 11/30/21 0629     WBC 8.49 10*3/mm3      RBC 2.51 10*6/mm3      Hemoglobin 6.9 g/dL      Hematocrit 20.6 %      MCV 82.1 fL      MCH 27.5 pg      MCHC 33.5 g/dL      RDW 16.2 %      RDW-SD 48.4 fl      MPV 9.7 fL      Platelets 130 10*3/mm3     Iron Profile  [[id number]]  (Abnormal) Collected: 11/30/21 0552    Specimen: Blood Updated: 11/30/21 0620     Iron 39 mcg/dL      Iron Saturation 21 %      Transferrin 124 mg/dL      TIBC 185 mcg/dL     COVID PRE-OP / PRE-PROCEDURE SCREENING ORDER (NO ISOLATION) - Swab, Nasopharynx [[id number]]  (Normal) Collected: 11/29/21 2343    Specimen: Swab from Nasopharynx Updated: 11/30/21 0444    Narrative:      The following orders were created for panel order COVID PRE-OP / PRE-PROCEDURE SCREENING ORDER (NO ISOLATION) - Swab, Nasopharynx.  Procedure                               Abnormality         Status                     ---------                               -----------         ------                     COVID-19,APTIMA PANTHER(...[[id number]]  Normal              Final result                 Please view results for these tests on the individual orders.    COVID-19,APTIMA PANTHER(MED), NAZ, NP/OP SWAB IN UTM/VTM/SALINE TRANSPORT MEDIA,24 HR TAT - Swab, Nasopharynx [[id number]]  (Normal) Collected: 11/29/21 2343    Specimen: Swab from Nasopharynx Updated: 11/30/21 0444     COVID19 Not Detected    Narrative:      Fact sheet for providers: https://www.fda.gov/media/817550/download     Fact sheet for patients: https://www.fda.gov/media/648721/download    Test performed by RT PCR.    Troponin [[id number]]  (Abnormal) Collected: 11/30/21 0153    Specimen: Blood Updated: 11/30/21 0255     Troponin T 0.117 ng/mL     Narrative:      Troponin T Reference Range:  <= 0.03 ng/mL-   Negative for AMI  >0.03 ng/mL-     Abnormal for myocardial necrosis.  Clinicians would have to utilize clinical acumen, EKG, Troponin and serial changes to determine if it is an Acute Myocardial Infarction or myocardial injury due to an underlying chronic condition.       Results may be falsely decreased if patient taking Biotin.      Basic Metabolic Panel [[id number]]  (Abnormal) Collected: 11/30/21 0153    Specimen: Blood Updated: 11/30/21 0220     Glucose 293 mg/dL       BUN 41 mg/dL      Creatinine 4.02 mg/dL      Sodium 132 mmol/L      Potassium 5.0 mmol/L      Chloride 100 mmol/L      CO2 23.2 mmol/L      Calcium 7.6 mg/dL      eGFR   Amer --     Comment: <15 Indicative of kidney failure.        eGFR Non African Amer 12 mL/min/1.73      Comment: <15 Indicative of kidney failure.        BUN/Creatinine Ratio 10.2     Anion Gap 8.8 mmol/L     Narrative:      GFR Normal >60  Chronic Kidney Disease <60  Kidney Failure <15      Troponin [[id number]]  (Abnormal) Collected: 11/29/21 2121    Specimen: Blood Updated: 11/29/21 2216     Troponin T 0.132 ng/mL     Narrative:      Troponin T Reference Range:  <= 0.03 ng/mL-   Negative for AMI  >0.03 ng/mL-     Abnormal for myocardial necrosis.  Clinicians would have to utilize clinical acumen, EKG, Troponin and serial changes to determine if it is an Acute Myocardial Infarction or myocardial injury due to an underlying chronic condition.       Results may be falsely decreased if patient taking Biotin.      BNP [[id number]]  (Abnormal) Collected: 11/29/21 2121    Specimen: Blood Updated: 11/29/21 2205     proBNP 5,443.0 pg/mL     Narrative:      Among patients with dyspnea, NT-proBNP is highly sensitive for the detection of acute congestive heart failure. In addition NT-proBNP of <300 pg/ml effectively rules out acute congestive heart failure with 99% negative predictive value.    Results may be falsely decreased if patient taking Biotin.      Comprehensive Metabolic Panel [[id number]]  (Abnormal) Collected: 11/29/21 2121    Specimen: Blood Updated: 11/29/21 2156     Glucose 292 mg/dL      BUN 40 mg/dL      Creatinine 4.22 mg/dL      Sodium 132 mmol/L      Potassium 5.0 mmol/L      Chloride 97 mmol/L      CO2 24.1 mmol/L      Calcium 7.9 mg/dL      Total Protein 5.4 g/dL      Albumin 2.70 g/dL      ALT (SGPT) 12 U/L      AST (SGOT) 23 U/L      Alkaline Phosphatase 88 U/L      Total Bilirubin 0.4 mg/dL      eGFR Non  Amer 11  mL/min/1.73      Comment: <15 Indicative of kidney failure.        eGFR   Amer --     Comment: <15 Indicative of kidney failure.        Globulin 2.7 gm/dL      A/G Ratio 1.0 g/dL      BUN/Creatinine Ratio 9.5     Anion Gap 10.9 mmol/L     Narrative:      GFR Normal >60  Chronic Kidney Disease <60  Kidney Failure <15      CBC & Differential [[id number]]  (Abnormal) Collected: 11/29/21 2121    Specimen: Blood Updated: 11/29/21 2135    Narrative:      The following orders were created for panel order CBC & Differential.  Procedure                               Abnormality         Status                     ---------                               -----------         ------                     CBC Auto Differential[[id number]]        Abnormal            Final result                 Please view results for these tests on the individual orders.    CBC Auto Differential [[id number]]  (Abnormal) Collected: 11/29/21 2121    Specimen: Blood Updated: 11/29/21 2135     WBC 8.88 10*3/mm3      RBC 3.14 10*6/mm3      Hemoglobin 8.7 g/dL      Hematocrit 25.7 %      MCV 81.8 fL      MCH 27.7 pg      MCHC 33.9 g/dL      RDW 15.9 %      RDW-SD 47.4 fl      MPV 10.1 fL      Platelets 167 10*3/mm3      Neutrophil % 82.0 %      Lymphocyte % 12.7 %      Monocyte % 3.9 %      Eosinophil % 0.5 %      Basophil % 0.3 %      Immature Grans % 0.6 %      Neutrophils, Absolute 7.28 10*3/mm3      Lymphocytes, Absolute 1.13 10*3/mm3      Monocytes, Absolute 0.35 10*3/mm3      Eosinophils, Absolute 0.04 10*3/mm3      Basophils, Absolute 0.03 10*3/mm3      Immature Grans, Absolute 0.05 10*3/mm3      nRBC 0.0 /100 WBC     POC Glucose Once [[id number]]  (Abnormal) Collected: 11/29/21 2006    Specimen: Blood Updated: 11/29/21 2008     Glucose 285 mg/dL      Comment: Meter: [id number] : [id number] Jacques Gomez RN             Imaging Results (All)     Procedure Component Value Units Date/Time     Renal Bilateral [[id number]] Collected: 11/30/21  0825     Updated: 11/30/21 0834    Narrative:      BILATERAL RENAL ULTRASOUND     CLINICAL HISTORY: Acute renal insufficiency. Hypertension.     The right kidney is normal in size and shape and shows no hydronephrosis  or masses. It measures 10.3 x 5.9 x 3.9 cm. The left kidney is also  normal in size and shape. It demonstrates mild left hydronephrosis. The  left kidney measures 9.9 x 5.1 x 4.5 cm. Images of the urinary bladder  demonstrate no obvious bladder masses. Doppler ultrasound shows evidence  of urine entering the bladder from the right ureteral orifice. No  definite urine was identified entering the bladder from the left  ureteral orifice. This supports a diagnosis of left ureteral  obstruction. Correlation with clinical findings is recommended. A CT  scan of the abdomen and pelvis may be helpful for further evaluation.     IMPRESSIONS: Findings consistent with left obstructive uropathy as  described. A CT scan of the abdomen and pelvis is suggested for further  evaluation.     This report was finalized on 11/30/2021 8:31 AM by Dr. Darien Barron M.D.       XR Chest 1 View [[id number]] Collected: 11/29/21 2154     Updated: 11/29/21 2158    Narrative:      SINGLE VIEW OF THE CHEST     HISTORY: Elevated blood-pressure shortness of breath     COMPARISON: None available.     FINDINGS:  Heart size is within normal limits. Lungs appear clear. No pneumothorax,  pleural effusion, or acute infiltrate is seen.       Impression:      No acute findings.     This report was finalized on 11/29/2021 9:55 PM by Dr. Cherri Rodriguez M.D.             Medication Review:   Current Facility-Administered Medications   Medication Dose Route Frequency Provider Last Rate Last Admin   • acetaminophen (TYLENOL) tablet 650 mg  650 mg Oral Q4H PRN Farida Laboy APRN        Or   • acetaminophen (TYLENOL) 160 MG/5ML solution 650 mg  650 mg Oral Q4H PRN Farida Laboy APRN        Or   • acetaminophen (TYLENOL)  suppository 650 mg  650 mg Rectal Q4H PRN Farida Laboy APRN       • amLODIPine (NORVASC) tablet 5 mg  5 mg Oral Q24H Alejo Lange MD       • aspirin chewable tablet 81 mg  81 mg Oral Daily Farida Laboy APRN   81 mg at 11/30/21 0931   • calcium carbonate (TUMS) chewable tablet 500 mg (200 mg elemental)  2 tablet Oral BID PRN Farida Laboy APRN       • dextrose (D50W) (25 g/50 mL) IV injection 25 g  25 g Intravenous Q15 Min PRN Farida Laboy APRN       • dextrose (GLUTOSE) oral gel 15 g  15 g Oral Q15 Min PRN Farida Laboy APRN       • escitalopram (LEXAPRO) tablet 20 mg  20 mg Oral Daily Farida Laboy APRN   20 mg at 11/30/21 0930   • glucagon (human recombinant) (GLUCAGEN DIAGNOSTIC) injection 1 mg  1 mg Subcutaneous PRN Farida Laboy APRN       • insulin lispro (ADMELOG) injection 0-9 Units  0-9 Units Subcutaneous 4x Daily With Meals & Nightly Farida Laboy APRN   2 Units at 11/30/21 2152   • labetalol (NORMODYNE,TRANDATE) injection 10 mg  10 mg Intravenous Q6H PRN Farida Laboy APRN   10 mg at 11/30/21 0201   • Levothyroxine Sodium injection 100 mcg  100 mcg Intravenous Daily Chao Fontanez MD   100 mcg at 11/30/21 2153   • metoprolol tartrate (LOPRESSOR) tablet 25 mg  25 mg Oral Q12H Ania Agrawal MD   25 mg at 11/30/21 2152   • nitroglycerin (NITROSTAT) SL tablet 0.4 mg  0.4 mg Sublingual Q5 Min PRN Farida Laboy APRN       • pantoprazole (PROTONIX) EC tablet 40 mg  40 mg Oral Daily Farida Laboy APRN   40 mg at 11/30/21 0930   • prochlorperazine (COMPAZINE) injection 5 mg  5 mg Intravenous Q6H PRN Farida Laboy APRN       • rOPINIRole (REQUIP) tablet 0.75 mg  0.75 mg Oral Nightly Farida Laboy APRN   0.75 mg at 11/30/21 2152   • sertraline (ZOLOFT) tablet 100 mg  100 mg Oral Daily Farida Laboy APRN   100 mg at 11/30/21 0930   • sodium chloride 0.9 % flush 10 mL  10 mL  Intravenous PRN Dinesh Virk MD       • sodium chloride 0.9 % flush 10 mL  10 mL Intravenous Q12H Farida Laboy APRN   10 mL at 11/30/21 2153   • sodium chloride 0.9 % flush 10 mL  10 mL Intravenous PRN Farida Laboy APRN       • sodium chloride 0.9 % infusion  100 mL/hr Intravenous Continuous Farida Laboy APRN 100 mL/hr at 11/30/21 1851 100 mL/hr at 11/30/21 1851       Current Facility-Administered Medications:   •  acetaminophen (TYLENOL) tablet 650 mg, 650 mg, Oral, Q4H PRN **OR** acetaminophen (TYLENOL) 160 MG/5ML solution 650 mg, 650 mg, Oral, Q4H PRN **OR** acetaminophen (TYLENOL) suppository 650 mg, 650 mg, Rectal, Q4H PRN, Farida Laboy APRN  •  amLODIPine (NORVASC) tablet 5 mg, 5 mg, Oral, Q24H, Alejo Lange MD  •  aspirin chewable tablet 81 mg, 81 mg, Oral, Daily, Farida Laboy APRN, 81 mg at 11/30/21 0931  •  calcium carbonate (TUMS) chewable tablet 500 mg (200 mg elemental), 2 tablet, Oral, BID PRN, Farida Laboy APRN  •  dextrose (D50W) (25 g/50 mL) IV injection 25 g, 25 g, Intravenous, Q15 Min PRN, Farida Laboy APRN  •  dextrose (GLUTOSE) oral gel 15 g, 15 g, Oral, Q15 Min PRN, Farida Laboy APRN  •  escitalopram (LEXAPRO) tablet 20 mg, 20 mg, Oral, Daily, Farida Laboy APRN, 20 mg at 11/30/21 0930  •  glucagon (human recombinant) (GLUCAGEN DIAGNOSTIC) injection 1 mg, 1 mg, Subcutaneous, PRN, Farida Laboy APRN  •  insulin lispro (ADMELOG) injection 0-9 Units, 0-9 Units, Subcutaneous, 4x Daily With Meals & Nightly, Farida Laboy APRN, 2 Units at 11/30/21 2152  •  labetalol (NORMODYNE,TRANDATE) injection 10 mg, 10 mg, Intravenous, Q6H PRN, Farida Laboy, RIKA, 10 mg at 11/30/21 0201  •  Levothyroxine Sodium injection 100 mcg, 100 mcg, Intravenous, Daily, Chao Fontanez MD, 100 mcg at 11/30/21 2153  •  metoprolol tartrate (LOPRESSOR) tablet 25 mg, 25 mg, Oral, Q12H, Ania Agrawal  MD PEDRITO, 25 mg at 11/30/21 2152  •  nitroglycerin (NITROSTAT) SL tablet 0.4 mg, 0.4 mg, Sublingual, Q5 Min PRN, Farida Laboy APRN  •  pantoprazole (PROTONIX) EC tablet 40 mg, 40 mg, Oral, Daily, Farida Laboy APRN, 40 mg at 11/30/21 0930  •  prochlorperazine (COMPAZINE) injection 5 mg, 5 mg, Intravenous, Q6H PRN, Farida Laboy APRN  •  rOPINIRole (REQUIP) tablet 0.75 mg, 0.75 mg, Oral, Nightly, Farida Laboy APRN, 0.75 mg at 11/30/21 2152  •  sertraline (ZOLOFT) tablet 100 mg, 100 mg, Oral, Daily, Farida aLboy APRN, 100 mg at 11/30/21 0930  •  [COMPLETED] Insert peripheral IV, , , Once **AND** sodium chloride 0.9 % flush 10 mL, 10 mL, Intravenous, PRN, Dinesh Virk MD  •  sodium chloride 0.9 % flush 10 mL, 10 mL, Intravenous, Q12H, Farida Laboy APRN, 10 mL at 11/30/21 2153  •  sodium chloride 0.9 % flush 10 mL, 10 mL, Intravenous, PRN, Farida Laboy APRN  •  sodium chloride 0.9 % infusion, 100 mL/hr, Intravenous, Continuous, Farida Laboy APRN, Last Rate: 100 mL/hr at 11/30/21 1851, 100 mL/hr at 11/30/21 1851  Medications Discontinued During This Encounter   Medication Reason   • amLODIPine (NORVASC) tablet 2.5 mg    • levothyroxine (SYNTHROID, LEVOTHROID) tablet 175 mcg    • Levothyroxine Sodium injection 100 mcg        Assessment/Plan         MAYUR (acute kidney injury) (HCC)    HTN (hypertension)    Hypothyroidism    Type 2 diabetes mellitus (HCC)    Rheumatoid arthritis (HCC)    Angioedema    GERD (gastroesophageal reflux disease)    Anemia  hydronephrosis on US      Plan CT scan A/P    Bryant Phan Jr., MD  12/01/21  07:53 EST

## 2021-12-01 NOTE — PROGRESS NOTES
"    [facility] Progress Note      Patient Name: Zaina Martinez  : 1965  MRN: [id number]  Primary Care Physician:  Provider, No Known  Date of admission: 2021    Subjective     Interval History:   Tolerating liquid diet but eager to have solid food  No shortness of breath on room air  Periorbital edema slightly better than yesterday  TSH returned yesterday: level 152!  IV levothyroxine started  She admits to missing \"several\" doses of Synthroid at home   evaluating left hydronephrosis; CT scan pending    Review of Systems:   As noted above    Objective     Vitals:   Temp:  [98.1 °F (36.7 °C)-98.2 °F (36.8 °C)] 98.1 °F (36.7 °C)  Heart Rate:  [51-67] 60  Resp:  [16-20] 16  BP: (150-183)/(75-92) 170/75    Intake/Output Summary (Last 24 hours) at 2021 1033  Last data filed at 2021 0821  Gross per 24 hour   Intake 1906.75 ml   Output 300 ml   Net 1606.75 ml       Physical Exam:    Constitutional: Awake, alert, NAD, very pale, chronically ill, legally blind  HEENT: Sclera anicteric, slightly puffy lips, AT/NC, periorbital edema  Neck: Supple, trachea at midline, no JVD, no bruit  Respiratory: Coarse BS but no wheezing, not labored on RA  Cardiovascular: RR, bradycardic, no rub  Gastrointestinal: BS +, abdomen is soft, nontender and nondistended  : No palpable bladder  Musculoskeletal: No significant LE edema, no clubbing or cyanosis  Psychiatric: Flat affect; cooperative; oriented  Neurologic: moving all extremities, normal speech and mental status  Skin: Warm and dry      Scheduled Meds:     amLODIPine, 5 mg, Oral, Q24H  aspirin, 81 mg, Oral, Daily  escitalopram, 20 mg, Oral, Daily  hydrALAZINE, 25 mg, Oral, Q8H  insulin lispro, 0-9 Units, Subcutaneous, 4x Daily With Meals & Nightly  Levothyroxine Sodium, 100 mcg, Intravenous, Daily  metoprolol tartrate, 25 mg, Oral, Q12H  pantoprazole, 40 mg, Oral, Daily  rOPINIRole, 0.75 mg, Oral, Nightly  sertraline, 100 mg, Oral, " Daily  sodium chloride, 10 mL, Intravenous, Q12H      IV Meds:   sodium chloride, 100 mL/hr, Last Rate: 100 mL/hr (12/01/21 0821)        Results Reviewed:   I have personally reviewed the results from the time of this admission to 12/1/2021 10:33 EST     Results from last 7 days   Lab Units 12/01/21  0636 11/30/21  0153 11/29/21 2121   SODIUM mmol/L 132* 132* 132*   POTASSIUM mmol/L 4.5 5.0 5.0   CHLORIDE mmol/L 101 100 97*   CO2 mmol/L 22.6 23.2 24.1   BUN mg/dL 41* 41* 40*   CREATININE mg/dL 3.76* 4.02* 4.22*   CALCIUM mg/dL 7.8* 7.6* 7.9*   BILIRUBIN mg/dL  --   --  0.4   ALK PHOS U/L  --   --  88   ALT (SGPT) U/L  --   --  12   AST (SGOT) U/L  --   --  23   GLUCOSE mg/dL 100* 293* 292*       Estimated Creatinine Clearance: 15.6 mL/min (A) (by C-G formula based on SCr of 3.76 mg/dL (H)).                Results from last 7 days   Lab Units 12/01/21  0636 11/30/21  1525 11/30/21  0552 11/29/21 2121   WBC 10*3/mm3 12.16*  --  8.49 8.88   HEMOGLOBIN g/dL 10.0* 8.2* 6.9* 8.7*   PLATELETS 10*3/mm3 184  --  130* 167             Assessment / Plan     ASSESSMENT:  1.  MAYUR vs MAYUR/CKD3, oliguric, minimally improved and multifactorial:  prerenal --> ATN due to N/V, poor oral intake with ongoing weight loss, +/- ABLA, vasospasm from HTN urgency, and compromised renal autoregulation due to ARB; also has an obstructive component given left hydronephrosis.  FENa >1%.  Remote history of kidney stones.  2.  Facial swelling and confusion, both improving.  Probably due to myxedema from severe hypothyroidism.  In light of extremely elevated TSH, ARB as culprit for facial swelling is much less likely  3.  DM2 with poor control  4.  Anemia with dramatic drop in hemoglobin  5.  Hypertension  6.  Type II non-NSTEMI  7.  Severe hypothyroidism.  This is probably driving her mild hyponatremia as well  8.  Medication noncompliance    PLAN:  1.  Agree with Dr. Agrawal:  re-introduction of ARB is reasonable at later point once MAYUR  resolved  2.  Follow-up CT scan;  evaluating  3.  Amlodipine already added.  Will titrate upward on it and hydralazine as needed  4.  Halve IVF rate since she is taking liquid diet fine and blood pressure is high    Thank you for involving us in the care of Zaina Martinez.  Please feel free to call with any questions.    Alejo Lange MD  12/01/21  10:33 [facility]    [facility]  [phone]      Much of this encounter note is an electronic transcription/translation of spoken language to printed text. The electronic translation of spoken language may permit erroneous, or at times, nonsensical words or phrases to be inadvertently transcribed; Although I have reviewed the note for such errors, some may still exist.

## 2021-12-01 NOTE — PROGRESS NOTES
Name: Zaina Martinez ADMIT: 2021   : 1965  PCP: Provider, No Known    MRN: [id number] LOS: 2 days   AGE/SEX: 56 y.o. female  ROOM: 127/1     Subjective   Subjective    Awake and alert sitting in bed.  Eating her liquid lunch.  No specific complaints.  No abdominal pain.  No further vomiting.  Still very is very weak.       Objective   Objective   Vital Signs  Temp:  [98.1 °F (36.7 °C)-98.2 °F (36.8 °C)] 98.1 °F (36.7 °C)  Heart Rate:  [51-60] 55  Resp:  [16-18] 18  BP: (150-186)/(75-88) 186/83  SpO2:  [99 %-100 %] 100 %  on   ;   Device (Oxygen Therapy): room air  Body mass index is 23.78 kg/m².  Physical Exam  Constitutional:       General: She is not in acute distress.     Appearance: She is ill-appearing (Chronically).   HENT:      Head: Normocephalic.      Nose: Nose normal.      Mouth/Throat:      Mouth: Mucous membranes are moist.   Eyes:      Extraocular Movements: Extraocular movements intact.      Pupils: Pupils are equal, round, and reactive to light.   Cardiovascular:      Rate and Rhythm: Regular rhythm. Bradycardia present.      Pulses: Normal pulses.      Heart sounds: Normal heart sounds.   Pulmonary:      Effort: Pulmonary effort is normal. No respiratory distress.      Breath sounds: Normal breath sounds.   Abdominal:      General: Bowel sounds are normal. There is no distension.      Palpations: Abdomen is soft.   Musculoskeletal:      Cervical back: Normal range of motion and neck supple. No rigidity.   Skin:     General: Skin is warm and dry.      Coloration: Skin is not jaundiced.   Neurological:      General: No focal deficit present.      Mental Status: She is alert and oriented to person, place, and time.         Results Review     I reviewed the patient's new clinical results.  Results from last 7 days   Lab Units 21  0636 21  1525 21  0552 21  2121   WBC 10*3/mm3 12.16*  --  8.49 8.88   HEMOGLOBIN g/dL 10.0* 8.2* 6.9* 8.7*   PLATELETS 10*3/mm3 184  --   130* 167     Results from last 7 days   Lab Units 12/01/21  0636 11/30/21  0153 11/29/21 2121   SODIUM mmol/L 132* 132* 132*   POTASSIUM mmol/L 4.5 5.0 5.0   CHLORIDE mmol/L 101 100 97*   CO2 mmol/L 22.6 23.2 24.1   BUN mg/dL 41* 41* 40*   CREATININE mg/dL 3.76* 4.02* 4.22*   GLUCOSE mg/dL 100* 293* 292*   EGFR IF NONAFRICN AM mL/min/1.73 12* 12* 11*     Results from last 7 days   Lab Units 11/29/21 2121   ALBUMIN g/dL 2.70*   BILIRUBIN mg/dL 0.4   ALK PHOS U/L 88   AST (SGOT) U/L 23   ALT (SGPT) U/L 12     Results from last 7 days   Lab Units 12/01/21  0636 11/30/21  0153 11/29/21 2121   CALCIUM mg/dL 7.8* 7.6* 7.9*   ALBUMIN g/dL  --   --  2.70*        Results from last 7 days   Lab Units 11/30/21 2037 11/30/21  0552   IRON mcg/dL  --  39   IRON SATURATION %  --  21   TRANSFERRIN mg/dL  --  124*   TIBC mcg/dL  --  185*   OCCULT BLOOD, FECAL  Negative  --    TSH uIU/mL  --  152.000*     COVID19   Date Value Ref Range Status   11/29/2021 Not Detected Not Detected - Ref. Range Final     Hemoglobin A1C   Date/Time Value Ref Range Status   11/30/2021 0552 9.78 (H) 4.80 - 5.60 % Final     Glucose   Date/Time Value Ref Range Status   12/01/2021 1052 153 (H) 70 - 130 mg/dL Final     Comment:     Meter: [id number] : [id number] Srinivas Velazquez RN   11/30/2021 2136 168 (H) 70 - 130 mg/dL Final     Comment:     Meter: [id number] : [id number] Desmond Iqbal RN   11/30/2021 1606 108 70 - 130 mg/dL Final     Comment:     Meter: [id number] : [id number] Johann SAAVEDRA   11/30/2021 1313 141 (H) 70 - 130 mg/dL Final     Comment:     Meter: [id number] : [id number] Johann SAAVEDRA   11/30/2021 0806 264 (H) 70 - 130 mg/dL Final     Comment:     Meter: [id number] : [id number] Milind Jordan RN   11/29/2021 2006 285 (H) 70 - 130 mg/dL Final     Comment:     Meter: [id number] : [id number] Jacques Gomez RN       Adult Transthoracic Echo Complete W/ Cont if Necessary Per Protocol  · Calculated left  ventricular EF = 61.8% Estimated left ventricular EF was   in agreement with the calculated left ventricular EF. Left ventricular   systolic function is normal.  · Left ventricular diastolic function is consistent with (grade II w/high   LAP) pseudonormalization.  · The left atrial cavity is mildly dilated.  · Trace tricuspid valve regurgitation is present. Calculated right   ventricular systolic pressure from tricuspid regurgitation is 21.4 mmHg.     US Renal Bilateral  BILATERAL RENAL ULTRASOUND     CLINICAL HISTORY: Acute renal insufficiency. Hypertension.     The right kidney is normal in size and shape and shows no hydronephrosis  or masses. It measures 10.3 x 5.9 x 3.9 cm. The left kidney is also  normal in size and shape. It demonstrates mild left hydronephrosis. The  left kidney measures 9.9 x 5.1 x 4.5 cm. Images of the urinary bladder  demonstrate no obvious bladder masses. Doppler ultrasound shows evidence  of urine entering the bladder from the right ureteral orifice. No  definite urine was identified entering the bladder from the left  ureteral orifice. This supports a diagnosis of left ureteral  obstruction. Correlation with clinical findings is recommended. A CT  scan of the abdomen and pelvis may be helpful for further evaluation.     IMPRESSIONS: Findings consistent with left obstructive uropathy as  described. A CT scan of the abdomen and pelvis is suggested for further  evaluation.     This report was finalized on 11/30/2021 8:31 AM by Dr. Darien Barron M.D.       Scheduled Medications  amLODIPine, 5 mg, Oral, Q24H  aspirin, 81 mg, Oral, Daily  escitalopram, 20 mg, Oral, Daily  hydrALAZINE, 25 mg, Oral, Q8H  insulin lispro, 0-9 Units, Subcutaneous, 4x Daily With Meals & Nightly  Levothyroxine Sodium, 100 mcg, Intravenous, Daily  metoprolol tartrate, 25 mg, Oral, Q12H  pantoprazole, 40 mg, Oral, Daily  rOPINIRole, 0.75 mg, Oral, Nightly  sertraline, 100 mg, Oral, Daily  sodium chloride, 10 mL,  Intravenous, Q12H    Infusions  sodium chloride, 50 mL/hr, Last Rate: 50 mL/hr (12/01/21 1049)    Diet  Diet Full Liquid       Assessment/Plan     Active Hospital Problems    Diagnosis  POA   • **MAYUR (acute kidney injury) (HCC) [N17.9]  Yes   • Medically noncompliant [Z91.19]  Not Applicable   • Myocardial infarction due to demand ischemia (HCC) [I21.A1]  Yes   • Angioedema [T78.3XXA]  Yes   • Anemia [D64.9]  Yes   • HTN (hypertension) [I10]  Yes   • Hypothyroidism [E03.9]  Yes   • Type 2 diabetes mellitus with hyperglycemia, with long-term current use of insulin (HCC) [E11.65, Z79.4]  Not Applicable   • Rheumatoid arthritis (HCC) [M06.9]  Yes      Resolved Hospital Problems    Diagnosis Date Resolved POA   • Hypertensive urgency [I16.0] 11/30/2021 Yes       56 y.o. female with MAYUR (acute kidney injury) (HCC).    Still with significant renal failure.  Appreciate nephrology assistance.  Tapering IV fluids.  Continue daily monitoring.  MAYUR felt to be multifactorial secondary to ATN, hypovolemia, severe anemia, hypertension and ARB use.  Urology is seeing her for hydronephrosis.  CT scan ordered but not yet performed.    Patient admits to poor medical compliance.  She is currently on IV levothyroxine.  Her A1c is almost 10% however she states this is down from most recent check of 12%.  She is generally compliant with her Lantus but does not take mealtime insulin at all.  She has been trying to watch her diet more so lately.  Currently on SSI only.  Will add low-dose Lantus.  She has an endocrinologist she will need to follow-up with after discharge.    Cardiology is following and adjusting BP medications.  Plan to reintroduce ACE or ARB as renal function improves.      · SCDs for DVT prophylaxis.  · Full code.  · Discussed with patient, family and nursing staff.  · Anticipate discharge home with family when cleared by consultants.      Fran Yo MD  [facility]  12/01/21  13:22 EST

## 2021-12-01 NOTE — CONSULTS
Patient Identification:  NAME:  Zaina Martinez  Age:  56 y.o.   Sex:  female   :  1965   MRN:  [id number]       Chief complaint: She does not have one, reason for consult change in mental status    History of present illness: Patient is a 56-year-old right-handed white female history of diabetes thyroid disease GERD rheumatoid arthritis who comes to the hospital with at least several days of lethargy and confusion in duration context patient whose creatinine was greater than 4 with acute kidney injury she also had some evidence of angioedema is context location is not pertinent duration is noted modifying factors neurologically none associated symptoms she is lethargic and confused she also had severe elevation of her blood pressure at one point with systolics greater than 200 she has not had a CT scan or MRI scan of her brain at this point  According to the chart she is not compliant with her medications.  Cardiology has seen her and has been following the elevated troponin and working her up for a possible non-NSTEMI      Past medical history:  Past Medical History:   Diagnosis Date   • Diabetes (HCC)    • Disease of thyroid gland    • GERD (gastroesophageal reflux disease)    • Hypertension    • Rheumatoid arthritis (HCC)        Past surgical history:  Past Surgical History:   Procedure Laterality Date   • EYE SURGERY     • HYSTERECTOMY         Allergies:  Patient has no known allergies.    Home medications:  Medications Prior to Admission   Medication Sig Dispense Refill Last Dose   • aspirin 81 MG chewable tablet Chew 81 mg Daily.   2021 at Unknown time   • escitalopram (LEXAPRO) 20 MG tablet Take 20 mg by mouth Daily.   2021 at Unknown time   • glucose blood (Accu-Chek Liz Plus) test strip 4 (Four) Times a Day.   2021 at Unknown time   • glucose blood (Accu-Chek Guide) test strip 1 each by Other route 4 (Four) Times a Day.   2021 at Unknown time   • insulin lispro (humaLOG)  100 UNIT/ML injection Inject  under the skin into the appropriate area as directed 3 (Three) Times a Day.   11/29/2021 at Unknown time   • levothyroxine (SYNTHROID, LEVOTHROID) 175 MCG tablet Take 175 mcg by mouth Daily.   11/29/2021 at Unknown time   • losartan (COZAAR) 50 MG tablet TAKE ONE TABLET BY MOUTH EVERY DAY AS DIRECTED- DUE FOR CHECK UP   11/29/2021 at Unknown time   • pantoprazole (PROTONIX) 40 MG EC tablet Take 40 mg by mouth Daily.   11/29/2021 at Unknown time   • rOPINIRole (REQUIP) 0.25 MG tablet Take 3 tablets by mouth every night at bedtime.   11/29/2021 at Unknown time   • sertraline (ZOLOFT) 100 MG tablet Take 100 mg by mouth Daily.   11/29/2021 at Unknown time   • temazepam (RESTORIL) 7.5 MG capsule Take 7.5 mg by mouth.   Past Week at Unknown time        Hospital medications:  amLODIPine, 5 mg, Oral, Q24H  aspirin, 81 mg, Oral, Daily  diazePAM, 5 mg, Intravenous, Once  escitalopram, 20 mg, Oral, Daily  hydrALAZINE, 25 mg, Oral, Q8H  insulin glargine, 10 Units, Subcutaneous, QAM  insulin lispro, 0-9 Units, Subcutaneous, 4x Daily With Meals & Nightly  Levothyroxine Sodium, 100 mcg, Intravenous, Daily  metoprolol tartrate, 25 mg, Oral, Q12H  pantoprazole, 40 mg, Oral, Daily  rOPINIRole, 0.75 mg, Oral, Nightly  sertraline, 100 mg, Oral, Daily  sodium chloride, 10 mL, Intravenous, Q12H      sodium chloride, 50 mL/hr, Last Rate: 50 mL/hr (12/01/21 1049)      •  acetaminophen **OR** [DISCONTINUED] acetaminophen **OR** [DISCONTINUED] acetaminophen  •  dextrose  •  dextrose  •  glucagon (human recombinant)  •  labetalol  •  nitroglycerin  •  prochlorperazine  •  [COMPLETED] Insert peripheral IV **AND** sodium chloride  •  sodium chloride    Family history:  History reviewed. No pertinent family history.    Social history:  Social History     Tobacco Use   • Smoking status: Never Smoker   • Smokeless tobacco: Never Used   Substance Use Topics   • Alcohol use: Not on file   • Drug use: Not on file        Review of systems:    Cannot give any review of systems her  notes that she is had hypertension but has not had a stroke also has diabetes and he cannot really give any pertinent review of systems at this time.  She cannot tell me anything and I reviewed the chart fully    Objective:  Vitals Ranges:   Temp:  [98.1 °F (36.7 °C)-98.2 °F (36.8 °C)] 98.1 °F (36.7 °C)  Heart Rate:  [51-63] 57  Resp:  [16-18] 16  BP: (139-186)/(58-88) 139/58      Physical Exam:  Awake lethargic but answers questions looks older than her stated age fund of knowledge poor attention span concentration poor recent remote memory fair language function no dysarthria no aphasia she is able to comprehend name and repeat count fingers pupils 3 constricting slightly bilaterally extraocular movements are at least full horizontally she would not give a big smile but her face appears symmetrical tongue is midline at all the cranial nerves she would give me motor she does  on both sides and withdraws both feet is not really following commands no rigidity atrophy or fasciculations reflexes trace throughout symmetrical toes downgoing bilaterally she was too lethargic to follow coordination sensation station or gait heart is regular without murmur neck supple without bruits extremities no clubbing cyanosis mild peripheral edema in the feet, visual acuity normal at 3 feet  Results review:   I reviewed the patient's new clinical results.    Data review:  Lab Results (last 24 hours)     Procedure Component Value Units Date/Time    POC Glucose Once [[id number]]  (Abnormal) Collected: 12/01/21 1526    Specimen: Blood Updated: 12/01/21 1529     Glucose 148 mg/dL      Comment: Meter: [id number] : [id number] Henok Best RN       POC Glucose Once [[id number]]  (Abnormal) Collected: 12/01/21 1052    Specimen: Blood Updated: 12/01/21 1054     Glucose 153 mg/dL      Comment: Meter: [id number] : [id number] Srinivas Velazquez RN       Basic  Metabolic Panel [[id number]]  (Abnormal) Collected: 12/01/21 0636    Specimen: Blood Updated: 12/01/21 0713     Glucose 100 mg/dL      BUN 41 mg/dL      Creatinine 3.76 mg/dL      Sodium 132 mmol/L      Potassium 4.5 mmol/L      Chloride 101 mmol/L      CO2 22.6 mmol/L      Calcium 7.8 mg/dL      eGFR   Amer --     Comment: <15 Indicative of kidney failure.        eGFR Non African Amer 12 mL/min/1.73      Comment: <15 Indicative of kidney failure.        BUN/Creatinine Ratio 10.9     Anion Gap 8.4 mmol/L     Narrative:      GFR Normal >60  Chronic Kidney Disease <60  Kidney Failure <15      CBC & Differential [[id number]]  (Abnormal) Collected: 12/01/21 0636    Specimen: Blood Updated: 12/01/21 0659    Narrative:      The following orders were created for panel order CBC & Differential.  Procedure                               Abnormality         Status                     ---------                               -----------         ------                     CBC Auto Differential[[id number]]        Abnormal            Final result                 Please view results for these tests on the individual orders.    CBC Auto Differential [[id number]]  (Abnormal) Collected: 12/01/21 0636    Specimen: Blood Updated: 12/01/21 0659     WBC 12.16 10*3/mm3      RBC 3.54 10*6/mm3      Hemoglobin 10.0 g/dL      Hematocrit 29.9 %      MCV 84.5 fL      MCH 28.2 pg      MCHC 33.4 g/dL      RDW 16.4 %      RDW-SD 51.0 fl      MPV 9.8 fL      Platelets 184 10*3/mm3      Neutrophil % 62.4 %      Lymphocyte % 27.0 %      Monocyte % 7.4 %      Eosinophil % 1.6 %      Basophil % 0.6 %      Immature Grans % 1.0 %      Neutrophils, Absolute 7.59 10*3/mm3      Lymphocytes, Absolute 3.28 10*3/mm3      Monocytes, Absolute 0.90 10*3/mm3      Eosinophils, Absolute 0.20 10*3/mm3      Basophils, Absolute 0.07 10*3/mm3      Immature Grans, Absolute 0.12 10*3/mm3      nRBC 0.0 /100 WBC     POC Glucose Once [[id number]]  (Abnormal) Collected:  11/30/21 2136    Specimen: Blood Updated: 11/30/21 2137     Glucose 168 mg/dL      Comment: Meter: [id number] : [id number] Desmond Iqbal RN       Occult Blood X 1, Stool - Stool, Per Rectum [[id number]]  (Normal) Collected: 11/30/21 2037    Specimen: Stool from Per Rectum Updated: 11/30/21 2108     Fecal Occult Blood Negative    Protein, Urine, Random - [[id number]] Collected: 11/30/21 1158    Specimen: Urine Updated: 11/30/21 1811     Total Protein, Urine 1,119.0 mg/dL     Narrative:      Reference intervals for random urine have not been established.  Clinical usage is dependent upon physician's interpretation in combination with other laboratory tests.              Imaging:  Imaging Results (Last 24 Hours)     ** No results found for the last 24 hours. **         PPE worn at all times washed before washed afterwards disposed of everything properly was now within 6 feet of her for more than few minutes during my exam no aerosols used at any point    Assessment and Plan:       MAYUR (acute kidney injury) (HCC)    HTN (hypertension)    Hypothyroidism    Type 2 diabetes mellitus with hyperglycemia, with long-term current use of insulin (HCC)    Rheumatoid arthritis (HCC)    Angioedema    Anemia    Medically noncompliant    Myocardial infarction due to demand ischemia (HCC)    This patient has a metabolic encephalopathy/confusion that in part is going to be related to her acute renal failure but also she apparently has significant hypothyroidism with severe elevation of the TSH.  She does not look like an acute stroke syndrome but easily could have a posterior reversible encephalopathic syndrome related to her extremely elevated blood pressure when she came in.  It is noted she may have an acute non-STEMI cardiology is following for this  At this point I will check a plain MRI scan of the brain as her creatinine is elevated I will also check the remainder of the thyroid profile and further recommendations can be  made  In short if the MRI scan does not show an acute stroke syndrome, and the thyroid profile is okay, there is likely little else neurology would recommend at this time other than controlling her blood pressure and following up both renal and cardiac recommendations at this time.      Adonis Fabian MD  12/01/21  16:35 EST

## 2021-12-01 NOTE — PROGRESS NOTES
LOS: 2 days   Patient Care Team:  Provider, No Known as PCP - General    Chief Complaint:     Follow-up hypertension, elevated troponin.    Interval History:     She denies chest pain, dyspnea at rest, palpitations.  She has exertional dyspnea, weakness and fatigue.  She continues to have some nausea but has had no vomiting or diarrhea here.    Echo 11/30/21  Interpretation Summary    · Calculated left ventricular EF = 61.8% Estimated left ventricular EF was in agreement with the calculated left ventricular EF. Left ventricular systolic function is normal.  · Left ventricular diastolic function is consistent with (grade II w/high LAP) pseudonormalization.  · The left atrial cavity is mildly dilated.  · Trace tricuspid valve regurgitation is present. Calculated right ventricular systolic pressure from tricuspid regurgitation is 21.4 mmHg.        Objective   Vital Signs  Temp:  [98.1 °F (36.7 °C)-98.2 °F (36.8 °C)] 98.1 °F (36.7 °C)  Heart Rate:  [51-67] 60  Resp:  [16-20] 16  BP: (150-183)/(75-92) 170/75    Intake/Output Summary (Last 24 hours) at 12/1/2021 1002  Last data filed at 12/1/2021 0821  Gross per 24 hour   Intake 1906.75 ml   Output 300 ml   Net 1606.75 ml       Comfortable NAD  Neck supple, no JVD or thyromegaly appreciated  S1/S2 RRR, no m/r/g  Lungs CTA B, normal effort  Abdomen S/NT/ND (+) BS, no HSM appreciated  Extremities warm, no clubbing, cyanosis, or edema  No visible or palpable skin lesions  A/Ox4, mood and affect appropriate    Results Review:      Results from last 7 days   Lab Units 12/01/21  0636 11/30/21  0153 11/30/21  0153 11/29/21 2121 11/29/21 2121   SODIUM mmol/L 132*  --  132*  --  132*   POTASSIUM mmol/L 4.5  --  5.0  --  5.0   CHLORIDE mmol/L 101  --  100  --  97*   CO2 mmol/L 22.6  --  23.2  --  24.1   BUN mg/dL 41*  --  41*  --  40*   CREATININE mg/dL 3.76*  --  4.02*  --  4.22*   GLUCOSE mg/dL 100*   < > 293*   < > 292*   CALCIUM mg/dL 7.8*  --  7.6*  --  7.9*    < > =  values in this interval not displayed.     Results from last 7 days   Lab Units 11/30/21  0153 11/29/21 2121   TROPONIN T ng/mL 0.117* 0.132*     Results from last 7 days   Lab Units 12/01/21  0636 11/30/21  1525 11/30/21  0552 11/29/21 2121 11/29/21 2121   WBC 10*3/mm3 12.16*  --  8.49  --  8.88   HEMOGLOBIN g/dL 10.0* 8.2* 6.9*   < > 8.7*   HEMATOCRIT % 29.9* 24.5* 20.6*   < > 25.7*   PLATELETS 10*3/mm3 184  --  130*  --  167    < > = values in this interval not displayed.                       I reviewed the patient's new clinical results.  I personally viewed and interpreted the patient's EKG/Telemetry data        Medication Review:   amLODIPine, 5 mg, Oral, Q24H  aspirin, 81 mg, Oral, Daily  escitalopram, 20 mg, Oral, Daily  insulin lispro, 0-9 Units, Subcutaneous, 4x Daily With Meals & Nightly  Levothyroxine Sodium, 100 mcg, Intravenous, Daily  metoprolol tartrate, 25 mg, Oral, Q12H  pantoprazole, 40 mg, Oral, Daily  rOPINIRole, 0.75 mg, Oral, Nightly  sertraline, 100 mg, Oral, Daily  sodium chloride, 10 mL, Intravenous, Q12H        sodium chloride, 100 mL/hr, Last Rate: 100 mL/hr (12/01/21 0821)        Assessment/Plan       MAYUR (acute kidney injury) (HCC)    HTN (hypertension)    Hypothyroidism    Type 2 diabetes mellitus (HCC)    Rheumatoid arthritis (HCC)    Angioedema    GERD (gastroesophageal reflux disease)    Anemia    1. Facial swelling, really periorbital swelling and not so much angioedema as her lips, tongue and throat do not seem to be affected with swelling.  I am not really convinced that this is an ACE angioedema.  Likely this is due to her severe hypothyroidism and it is likely safe in the future to try to reintroduce an ACE or an ARB in the setting.  2. Hypertensive emergency, initiate metoprolol 25 twice daily and amlodipine 2.5 mg daily.  If needed, could go on a Cardene drip.  Blood pressure still high, somewhat limited on medications use as her heart rate is running in the 60s, consider  low-dose hydralazine for now.  As her renal function improves, would reintroduce ACE or ARB.  3. Severe anemia, getting transfused, hemoccult stool (-) 11/30/21.   4. CKD with MAYUR with obstructive left uropathy now noted on renal ultrasound - nephrology and urology seeing.   5. Diabetes mellitus, poorly controlled.   6. History rheumatoid arthritis  7. Hypothyroidism  8. Elevated troponin in the setting of hypertensive emergency, acute kidney injury and anemia.  This is likely a type II non-STEMI.  Will check an echocardiogram.  9. Nausea and vomiting at home with weight loss- will see if resolves by treating other pathologies, it not, may need w/u.  10. Severe hypothyroidism, likely noncompliant with medications.  This could be the source of her facial edema and not lisinopril.    Add hydralazine and continue other meds. Spoke with nephrology about pt.     Ania Agrawal MD  12/01/21  10:02 EST

## 2021-12-02 ENCOUNTER — APPOINTMENT (OUTPATIENT)
Dept: MRI IMAGING | Facility: HOSPITAL | Age: 56
End: 2021-12-02

## 2021-12-02 ENCOUNTER — APPOINTMENT (OUTPATIENT)
Dept: CT IMAGING | Facility: HOSPITAL | Age: 56
End: 2021-12-02

## 2021-12-02 LAB
ALBUMIN SERPL-MCNC: 2.1 G/DL (ref 3.5–5.2)
AMPHET+METHAMPHET UR QL: NEGATIVE
ANION GAP SERPL CALCULATED.3IONS-SCNC: 10.3 MMOL/L (ref 5–15)
BARBITURATES UR QL SCN: NEGATIVE
BASOPHILS # BLD AUTO: 0.04 10*3/MM3 (ref 0–0.2)
BASOPHILS NFR BLD AUTO: 0.4 % (ref 0–1.5)
BENZODIAZ UR QL SCN: POSITIVE
BUN SERPL-MCNC: 42 MG/DL (ref 6–20)
BUN/CREAT SERPL: 10 (ref 7–25)
C DIFF TOX GENS STL QL NAA+PROBE: NEGATIVE
CALCIUM SPEC-SCNC: 7.2 MG/DL (ref 8.6–10.5)
CANNABINOIDS SERPL QL: NEGATIVE
CHLORIDE SERPL-SCNC: 105 MMOL/L (ref 98–107)
CO2 SERPL-SCNC: 18.7 MMOL/L (ref 22–29)
COCAINE UR QL: NEGATIVE
CREAT SERPL-MCNC: 4.22 MG/DL (ref 0.57–1)
DEPRECATED RDW RBC AUTO: 48.7 FL (ref 37–54)
EOSINOPHIL # BLD AUTO: 0.18 10*3/MM3 (ref 0–0.4)
EOSINOPHIL NFR BLD AUTO: 1.7 % (ref 0.3–6.2)
ERYTHROCYTE [DISTWIDTH] IN BLOOD BY AUTOMATED COUNT: 15.7 % (ref 12.3–15.4)
GFR SERPL CREATININE-BSD FRML MDRD: 11 ML/MIN/1.73
GFR SERPL CREATININE-BSD FRML MDRD: ABNORMAL ML/MIN/{1.73_M2}
GLUCOSE BLDC GLUCOMTR-MCNC: 130 MG/DL (ref 70–130)
GLUCOSE BLDC GLUCOMTR-MCNC: 58 MG/DL (ref 70–130)
GLUCOSE BLDC GLUCOMTR-MCNC: 79 MG/DL (ref 70–130)
GLUCOSE BLDC GLUCOMTR-MCNC: 80 MG/DL (ref 70–130)
GLUCOSE BLDC GLUCOMTR-MCNC: 82 MG/DL (ref 70–130)
GLUCOSE SERPL-MCNC: 74 MG/DL (ref 65–99)
HCT VFR BLD AUTO: 27.8 % (ref 34–46.6)
HGB BLD-MCNC: 9.2 G/DL (ref 12–15.9)
IMM GRANULOCYTES # BLD AUTO: 0.07 10*3/MM3 (ref 0–0.05)
IMM GRANULOCYTES NFR BLD AUTO: 0.7 % (ref 0–0.5)
LYMPHOCYTES # BLD AUTO: 2.5 10*3/MM3 (ref 0.7–3.1)
LYMPHOCYTES NFR BLD AUTO: 23.7 % (ref 19.6–45.3)
MAGNESIUM SERPL-MCNC: 1.5 MG/DL (ref 1.6–2.6)
MCH RBC QN AUTO: 28.1 PG (ref 26.6–33)
MCHC RBC AUTO-ENTMCNC: 33.1 G/DL (ref 31.5–35.7)
MCV RBC AUTO: 85 FL (ref 79–97)
METHADONE UR QL SCN: NEGATIVE
MONOCYTES # BLD AUTO: 0.87 10*3/MM3 (ref 0.1–0.9)
MONOCYTES NFR BLD AUTO: 8.2 % (ref 5–12)
NEUTROPHILS NFR BLD AUTO: 6.9 10*3/MM3 (ref 1.7–7)
NEUTROPHILS NFR BLD AUTO: 65.3 % (ref 42.7–76)
NRBC BLD AUTO-RTO: 0 /100 WBC (ref 0–0.2)
OPIATES UR QL: NEGATIVE
OXYCODONE UR QL SCN: POSITIVE
PHOSPHATE SERPL-MCNC: 5.6 MG/DL (ref 2.5–4.5)
PLATELET # BLD AUTO: 170 10*3/MM3 (ref 140–450)
PMV BLD AUTO: 10.7 FL (ref 6–12)
POTASSIUM SERPL-SCNC: 4.8 MMOL/L (ref 3.5–5.2)
RBC # BLD AUTO: 3.27 10*6/MM3 (ref 3.77–5.28)
SODIUM SERPL-SCNC: 134 MMOL/L (ref 136–145)
T4 FREE SERPL-MCNC: 0.9 NG/DL (ref 0.93–1.7)
WBC NRBC COR # BLD: 10.56 10*3/MM3 (ref 3.4–10.8)

## 2021-12-02 PROCEDURE — 85025 COMPLETE CBC W/AUTO DIFF WBC: CPT | Performed by: INTERNAL MEDICINE

## 2021-12-02 PROCEDURE — 74176 CT ABD & PELVIS W/O CONTRAST: CPT

## 2021-12-02 PROCEDURE — 83735 ASSAY OF MAGNESIUM: CPT | Performed by: INTERNAL MEDICINE

## 2021-12-02 PROCEDURE — 80307 DRUG TEST PRSMV CHEM ANLYZR: CPT | Performed by: NURSE PRACTITIONER

## 2021-12-02 PROCEDURE — 99232 SBSQ HOSP IP/OBS MODERATE 35: CPT | Performed by: INTERNAL MEDICINE

## 2021-12-02 PROCEDURE — A9585 GADOBUTROL INJECTION: HCPCS | Performed by: HOSPITALIST

## 2021-12-02 PROCEDURE — 87493 C DIFF AMPLIFIED PROBE: CPT | Performed by: NURSE PRACTITIONER

## 2021-12-02 PROCEDURE — 63710000001 INSULIN GLARGINE PER 5 UNITS: Performed by: HOSPITALIST

## 2021-12-02 PROCEDURE — 25010000002 DIAZEPAM PER 5 MG: Performed by: HOSPITALIST

## 2021-12-02 PROCEDURE — 80069 RENAL FUNCTION PANEL: CPT | Performed by: INTERNAL MEDICINE

## 2021-12-02 PROCEDURE — 82962 GLUCOSE BLOOD TEST: CPT

## 2021-12-02 PROCEDURE — 70551 MRI BRAIN STEM W/O DYE: CPT

## 2021-12-02 PROCEDURE — 84439 ASSAY OF FREE THYROXINE: CPT | Performed by: PSYCHIATRY & NEUROLOGY

## 2021-12-02 PROCEDURE — 70553 MRI BRAIN STEM W/O & W/DYE: CPT

## 2021-12-02 PROCEDURE — 99231 SBSQ HOSP IP/OBS SF/LOW 25: CPT | Performed by: PSYCHIATRY & NEUROLOGY

## 2021-12-02 PROCEDURE — 0 GADOBUTROL 1 MMOL/ML SOLUTION: Performed by: HOSPITALIST

## 2021-12-02 RX ORDER — AMLODIPINE BESYLATE 2.5 MG/1
2.5 TABLET ORAL
Status: DISCONTINUED | OUTPATIENT
Start: 2021-12-03 | End: 2021-12-03

## 2021-12-02 RX ORDER — DIAZEPAM 5 MG/ML
5 INJECTION, SOLUTION INTRAMUSCULAR; INTRAVENOUS ONCE
Status: COMPLETED | OUTPATIENT
Start: 2021-12-02 | End: 2021-12-02

## 2021-12-02 RX ORDER — HYDRALAZINE HYDROCHLORIDE 50 MG/1
50 TABLET, FILM COATED ORAL EVERY 8 HOURS SCHEDULED
Status: DISCONTINUED | OUTPATIENT
Start: 2021-12-02 | End: 2021-12-04

## 2021-12-02 RX ORDER — UREA 10 %
3 LOTION (ML) TOPICAL NIGHTLY
Status: DISCONTINUED | OUTPATIENT
Start: 2021-12-02 | End: 2021-12-07

## 2021-12-02 RX ADMIN — Medication 3 MG: at 20:30

## 2021-12-02 RX ADMIN — GADOBUTROL 6 ML: 604.72 INJECTION INTRAVENOUS at 12:45

## 2021-12-02 RX ADMIN — ESCITALOPRAM 20 MG: 20 TABLET, FILM COATED ORAL at 13:54

## 2021-12-02 RX ADMIN — SODIUM CHLORIDE, PRESERVATIVE FREE 10 ML: 5 INJECTION INTRAVENOUS at 12:06

## 2021-12-02 RX ADMIN — SERTRALINE 100 MG: 100 TABLET, FILM COATED ORAL at 11:49

## 2021-12-02 RX ADMIN — ROPINIROLE HYDROCHLORIDE 0.75 MG: 0.5 TABLET, FILM COATED ORAL at 06:15

## 2021-12-02 RX ADMIN — DIAZEPAM 5 MG: 5 INJECTION, SOLUTION INTRAMUSCULAR; INTRAVENOUS at 13:00

## 2021-12-02 RX ADMIN — SODIUM CHLORIDE, PRESERVATIVE FREE 10 ML: 5 INJECTION INTRAVENOUS at 20:31

## 2021-12-02 RX ADMIN — HYDRALAZINE HYDROCHLORIDE 50 MG: 50 TABLET, FILM COATED ORAL at 13:54

## 2021-12-02 RX ADMIN — AMLODIPINE BESYLATE 5 MG: 5 TABLET ORAL at 12:04

## 2021-12-02 RX ADMIN — INSULIN GLARGINE 10 UNITS: 100 INJECTION, SOLUTION SUBCUTANEOUS at 06:14

## 2021-12-02 RX ADMIN — ROPINIROLE HYDROCHLORIDE 0.75 MG: 0.5 TABLET, FILM COATED ORAL at 20:31

## 2021-12-02 RX ADMIN — ASPIRIN 81 MG: 81 TABLET, CHEWABLE ORAL at 11:49

## 2021-12-02 RX ADMIN — Medication 3 MG: at 00:53

## 2021-12-02 RX ADMIN — METOPROLOL TARTRATE 25 MG: 25 TABLET, FILM COATED ORAL at 20:31

## 2021-12-02 RX ADMIN — LEVOTHYROXINE SODIUM 100 MCG: 20 INJECTION, SOLUTION INTRAVENOUS at 13:55

## 2021-12-02 RX ADMIN — PANTOPRAZOLE SODIUM 40 MG: 40 TABLET, DELAYED RELEASE ORAL at 11:49

## 2021-12-02 RX ADMIN — SODIUM CHLORIDE 100 ML/HR: 9 INJECTION, SOLUTION INTRAVENOUS at 20:30

## 2021-12-02 RX ADMIN — SODIUM CHLORIDE 50 ML/HR: 9 INJECTION, SOLUTION INTRAVENOUS at 01:04

## 2021-12-02 NOTE — CASE MANAGEMENT/SOCIAL WORK
Discharge Planning Assessment  [facility]     Patient Name: Zaina Martinez  MRN: [id number]  Today's Date: 12/2/2021    Admit Date: 11/29/2021     Discharge Needs Assessment     Row Name 12/02/21 1134       Living Environment    Lives With spouse; grandchild(pro)    Name(s) of Who Lives With Patient Marv Martinez    Current Living Arrangements home/apartment/condo    Primary Care Provided by self; spouse/significant other    Provides Primary Care For no one, unable/limited ability to care for self    Family Caregiver if Needed spouse    Family Caregiver Names Marv Martinez    Able to Return to Prior Arrangements yes       Resource/Environmental Concerns    Resource/Environmental Concerns none       Transition Planning    Patient/Family Anticipates Transition to home with family    Patient/Family Anticipated Services at Transition none    Transportation Anticipated family or friend will provide       Discharge Needs Assessment    Equipment Currently Used at Home cane, straight    Concerns to be Addressed discharge planning; denies needs/concerns at this time               Discharge Plan     Row Name 12/02/21 1136       Plan    Plan Home, family to transport    Provided Post Acute Provider List? Refused    Refused Provider List Comment Denied need at this time    Provided Post Acute Provider Quality & Resource List? Refused    Refused Quality and Resource List Comment Denied need at this time    Patient/Family in Agreement with Plan yes    Plan Comments Due to patient being in isolation and disoriented, CCP spoke to patient’s spouse over the phone; explained role of CCP, verified facesheet, and discussed d/c planning needs. Plan is home, family to transport. Patient is enrolled in meds to beds. She uses no DME at home and is independent for mobility. She has a cane she can use if needed. She lives with her spouse and 2 grandkids in a 2 level home but resides on 1 level. There are 3 steps to get in with no handrail.  Patient’s spouse reports she does not leave the house often. Patient’s spouse reports he and the grandkids are able to help out around the house as needed.  He has no HH or SNF history. Patient’s spouse denies any d/c needs at this time. CCP to follow for any recommendations/need that arise pending treatment course. MEGGAN Lomeli              Continued Care and Services - Admitted Since 11/29/2021    Coordination has not been started for this encounter.          Demographic Summary     Row Name 12/02/21 1133       General Information    Admission Type inpatient    Arrived From home    Referral Source admission list    Reason for Consult discharge planning    Preferred Language English     Used During This Interaction no       Contact Information    Permission Granted to Share Info With family/designee               Functional Status     Row Name 12/02/21 1133       Functional Status    Usual Activity Tolerance fair    Current Activity Tolerance fair       Functional Status, IADL    Medications independent    Meal Preparation independent    Housekeeping independent    Laundry independent    Shopping independent       Mental Status    General Appearance WDL WDL       Employment/    Employment Status disabled               Psychosocial    No documentation.                Abuse/Neglect    No documentation.                Legal    No documentation.                Substance Abuse    No documentation.                Patient Forms    No documentation.                   MEGGAN ALVAREZ

## 2021-12-02 NOTE — PROGRESS NOTES
Name: Zaina Martinez ADMIT: 2021   : 1965  PCP: Provider, No Known    MRN: [id number] LOS: 3 days   AGE/SEX: 56 y.o. female  ROOM: E461/     Subjective   Subjective    Patient has had episodic confusion since I evaluated her yesterday.  She just returned from MRI when I saw her today having received Valium IV beforehand.  She is inattentive and somewhat confused.   at bedside.  Patient does deny any specific complaints when asked.       Objective   Objective   Vital Signs  Temp:  [97.9 °F (36.6 °C)-98.5 °F (36.9 °C)] 98.1 °F (36.7 °C)  Heart Rate:  [53-58] 53  Resp:  [17-20] 20  BP: (111-171)/() 152/99  SpO2:  [98 %-100 %] 100 %  on   ;   Device (Oxygen Therapy): room air  Body mass index is 24.69 kg/m².  Physical Exam  Constitutional:       General: She is not in acute distress.     Appearance: She is ill-appearing (Chronically).   HENT:      Head: Normocephalic.      Nose: Nose normal.      Mouth/Throat:      Mouth: Mucous membranes are moist.   Eyes:      Extraocular Movements: Extraocular movements intact.      Pupils: Pupils are equal, round, and reactive to light.   Cardiovascular:      Rate and Rhythm: Regular rhythm. Bradycardia present.      Pulses: Normal pulses.      Heart sounds: Normal heart sounds.   Pulmonary:      Effort: Pulmonary effort is normal. No respiratory distress.      Breath sounds: Normal breath sounds.   Abdominal:      General: Bowel sounds are normal. There is no distension.      Palpations: Abdomen is soft.   Musculoskeletal:      Cervical back: Normal range of motion and neck supple. No rigidity.   Skin:     General: Skin is warm and dry.      Coloration: Skin is not jaundiced.   Neurological:      General: No focal deficit present.      Mental Status: She is alert. She is disoriented.   Psychiatric:         Cognition and Memory: Cognition is impaired. Memory is impaired.         Judgment: Judgment is inappropriate.         Results Review     I reviewed  the patient's new clinical results.  Results from last 7 days   Lab Units 12/02/21  1147 12/01/21  0636 11/30/21  1525 11/30/21  0552 11/29/21 2121 11/29/21 2121   WBC 10*3/mm3 10.56 12.16*  --  8.49  --  8.88   HEMOGLOBIN g/dL 9.2* 10.0* 8.2* 6.9*   < > 8.7*   PLATELETS 10*3/mm3 170 184  --  130*  --  167    < > = values in this interval not displayed.     Results from last 7 days   Lab Units 12/02/21  1147 12/01/21 0636 11/30/21  0153 11/29/21  2121   SODIUM mmol/L 134* 132* 132* 132*   POTASSIUM mmol/L 4.8 4.5 5.0 5.0   CHLORIDE mmol/L 105 101 100 97*   CO2 mmol/L 18.7* 22.6 23.2 24.1   BUN mg/dL 42* 41* 41* 40*   CREATININE mg/dL 4.22* 3.76* 4.02* 4.22*   GLUCOSE mg/dL 74 100* 293* 292*   EGFR IF NONAFRICN AM mL/min/1.73 11* 12* 12* 11*     Results from last 7 days   Lab Units 12/02/21  1147 11/29/21 2121   ALBUMIN g/dL 2.10* 2.70*   BILIRUBIN mg/dL  --  0.4   ALK PHOS U/L  --  88   AST (SGOT) U/L  --  23   ALT (SGPT) U/L  --  12     Results from last 7 days   Lab Units 12/02/21  1147 12/01/21 0636 11/30/21 0153 11/29/21 2121   CALCIUM mg/dL 7.2* 7.8* 7.6* 7.9*   ALBUMIN g/dL 2.10*  --   --  2.70*   MAGNESIUM mg/dL 1.5*  --   --   --    PHOSPHORUS mg/dL 5.6*  --   --   --         Results from last 7 days   Lab Units 11/30/21 2037 11/30/21  0552   IRON mcg/dL  --  39   IRON SATURATION %  --  21   TRANSFERRIN mg/dL  --  124*   TIBC mcg/dL  --  185*   OCCULT BLOOD, FECAL  Negative  --    TSH uIU/mL  --  152.000*     COVID19   Date Value Ref Range Status   11/29/2021 Not Detected Not Detected - Ref. Range Final     Hemoglobin A1C   Date/Time Value Ref Range Status   11/30/2021 0552 9.78 (H) 4.80 - 5.60 % Final     Glucose   Date/Time Value Ref Range Status   12/02/2021 1114 80 70 - 130 mg/dL Final     Comment:     Meter: [id number] : [id number] [place] SAAVERDA   12/02/2021 0608 130 70 - 130 mg/dL Final     Comment:     Meter: [id number] : [id number] Jose SAAVEDRA   12/01/2021 2055 149 (H) 70 -  130 mg/dL Final     Comment:     Meter: [id number] : [id number] Bright Jenkins [employer]   12/01/2021 1654 129 70 - 130 mg/dL Final     Comment:     Meter: [id number] : [id number] Aureliano BOWERS Tech   12/01/2021 1526 148 (H) 70 - 130 mg/dL Final     Comment:     Meter: [id number] : [id number] Whitingsandee Desaia RN   12/01/2021 1052 153 (H) 70 - 130 mg/dL Final     Comment:     Meter: [id number] : [id number] Srinivas Caroline RN   11/30/2021 2136 168 (H) 70 - 130 mg/dL Final     Comment:     Meter: [id number] : [id number] Desmond Iqbal RN       CT Abdomen Pelvis Without Contrast  Narrative: CT ABDOMEN AND PELVIS WITHOUT IV CONTRAST     HISTORY: Hydronephrosis     TECHNIQUE: Radiation dose reduction techniques were utilized, including  automated exposure control and exposure modulation based on body size.  Axial images were obtained through the abdomen and pelvis without the  administration of IV contrast. Coronal and sagittal reformatted images  were obtained.     COMPARISON: Renal ultrasound 11/30/2021     FINDINGS:      ABDOMEN:  Mild scarring/atelectasis in the lung bases. Cholelithiasis. The liver  and spleen are unremarkable. Minimal left hydronephrosis is seen. There  is no obvious obstructing stone or lesion. No hydronephrosis on the  right. Renal vascular calcifications are present. The adrenal glands and  pancreas are unremarkable. There is some edema in the mesentery. Only  extremely minimal ascites is seen in the right paracolic gutter.  Anasarca. There are multiple thickened loops of small bowel present  within the mid and left abdomen, suggesting enteritis. No evidence for  bowel obstruction.     PELVIS:  There is diffuse thickening of the bladder, nonspecific. Minimal ascites  in the pelvis. Colon is unremarkable. No acute bony abnormality.     Impression: 1. Minimal left hydronephrosis without obvious obstruction. Bladder wall  is diffusely thickened, which may be the cause of the  minimal  hydronephrosis  2. Multiple thickened small bowel loops within the mid and left abdomen  suggesting enteritis. No evidence for bowel obstruction  3. Edema within the mesentery and very minimal ascites. Diffuse  anasarca.  4. Cholelithiasis     Radiation dose reduction techniques were utilized, including automated  exposure control and exposure modulation based on body size.     This report was finalized on 12/2/2021 8:54 AM by Dr. Bijan Ordoñez M.D.       Scheduled Medications  [START ON 12/3/2021] amLODIPine, 2.5 mg, Oral, Q24H  aspirin, 81 mg, Oral, Daily  escitalopram, 20 mg, Oral, Daily  hydrALAZINE, 50 mg, Oral, Q8H  insulin glargine, 10 Units, Subcutaneous, QAM  insulin lispro, 0-9 Units, Subcutaneous, 4x Daily With Meals & Nightly  Levothyroxine Sodium, 100 mcg, Intravenous, Daily  melatonin, 3 mg, Oral, Nightly  metoprolol tartrate, 25 mg, Oral, Q12H  pantoprazole, 40 mg, Oral, Daily  rOPINIRole, 0.75 mg, Oral, Nightly  sertraline, 100 mg, Oral, Daily  sodium chloride, 10 mL, Intravenous, Q12H    Infusions  sodium chloride, 100 mL/hr, Last Rate: 100 mL/hr (12/02/21 1356)    Diet  Diet Full Liquid       Assessment/Plan     Active Hospital Problems    Diagnosis  POA   • **MAYUR (acute kidney injury) (HCC) [N17.9]  Yes   • Medically noncompliant [Z91.19]  Not Applicable   • Myocardial infarction due to demand ischemia (HCC) [I21.A1]  Yes   • Angioedema [T78.3XXA]  Yes   • Anemia [D64.9]  Yes   • HTN (hypertension) [I10]  Yes   • Hypothyroidism [E03.9]  Yes   • Type 2 diabetes mellitus with hyperglycemia, with long-term current use of insulin (HCC) [E11.65, Z79.4]  Not Applicable   • Rheumatoid arthritis (HCC) [M06.9]  Yes      Resolved Hospital Problems    Diagnosis Date Resolved POA   • Hypertensive urgency [I16.0] 11/30/2021 Yes       56 y.o. female with MAYUR (acute kidney injury) (HCC).    CT scan abdomen/pelvis done this morning shows minimal left hydronephrosis without obvious obstruction.  There  is bladder wall thickening.  There is thickened small bowel loops suggesting enteritis but no obstruction.  Cholelithiasis without cholecystitis.  MRI done of brain still pending.  Discussed with Dr. Fabian who is evaluating.  Suspect multifactorial metabolic encephalopathy.   at bedside says she is not been completely well since she had COVID few months ago.    Records reviewed from outside hospital suggest was only admitted about 3 days with relatively asymptomatic COVID-19.  She was there primarily with GI symptoms, dehydration and renal failure.  There was episodic confusion/agitation during that hospitalization as well requiring Haldol.    Nephrology is following and managing IV fluids.  Renal function still quite abnormal.  Will defer electrolyte management to them.  She remains on IV levothyroxine.  Consider switching back to oral in a day or 2.  She has history of poor compliance with medication.    Blood sugars relatively stable.      · SCDs for DVT prophylaxis.  · Full code.  · Discussed with patient, family, nursing staff and consulting provider.  · Anticipate discharge home with family next week.      Fran Yo MD  [facility]  12/02/21  15:33 EST

## 2021-12-02 NOTE — PLAN OF CARE
Goal Outcome Evaluation:  Plan of Care Reviewed With: patient        Progress: no change  Outcome Summary: Pt came to the floor around midnight; had frequent liquid stools, [facility] was notified, check stool for c-diff; will also check urine for external opiates d/t confusion; melatonin ordered for sleep; IV fluid ongoing; educated pt about fall risk and importance of using the call light for help; continue to monitor.

## 2021-12-02 NOTE — PROGRESS NOTES
[facility] Progress Note      Patient Name: Zaina Martinez  : 1965  MRN: [id number]  Primary Care Physician:  Provider, No Known  Date of admission: 2021    Subjective     Interval History:   Confusion overnight; headed for MRI shortly  Breathing is comfortable on room air  CT scan finally done earlier this morning: Minimal left hydronephrosis with diffusely thickened bladder wall    Review of Systems:   As noted above    Objective     Vitals:   Temp:  [97.9 °F (36.6 °C)-98.5 °F (36.9 °C)] 98.5 °F (36.9 °C)  Heart Rate:  [53-63] 53  Resp:  [16-18] 18  BP: (111-186)/() 122/72    Intake/Output Summary (Last 24 hours) at 2021 1109  Last data filed at 2021 0615  Gross per 24 hour   Intake 432 ml   Output --   Net 432 ml       Physical Exam:    Constitutional: Awake, lethargic, very pale, chronically ill, legally blind  HEENT: Sclera anicteric, slightly puffy lips, AT/NC, periorbital edema  Neck: Supple, trachea at midline, no JVD, no bruit  Respiratory: Coarse BS but no wheezing, not labored on RA  Cardiovascular: RR, bradycardic, no rub  Gastrointestinal: BS +, abdomen is soft, nontender and nondistended  : No palpable bladder  Musculoskeletal: No significant LE edema, no clubbing or cyanosis  Psychiatric: Flat affect; cooperative; oriented  Neurologic: moving all extremities, normal speech and mental status  Skin: Warm and dry      Scheduled Meds:     amLODIPine, 5 mg, Oral, Q24H  aspirin, 81 mg, Oral, Daily  escitalopram, 20 mg, Oral, Daily  hydrALAZINE, 50 mg, Oral, Q8H  insulin glargine, 10 Units, Subcutaneous, QAM  insulin lispro, 0-9 Units, Subcutaneous, 4x Daily With Meals & Nightly  Levothyroxine Sodium, 100 mcg, Intravenous, Daily  melatonin, 3 mg, Oral, Nightly  metoprolol tartrate, 25 mg, Oral, Q12H  pantoprazole, 40 mg, Oral, Daily  rOPINIRole, 0.75 mg, Oral, Nightly  sertraline, 100 mg, Oral, Daily  sodium chloride, 10 mL, Intravenous,  Q12H      IV Meds:   sodium chloride, 50 mL/hr, Last Rate: 50 mL/hr (12/02/21 0104)        Results Reviewed:   I have personally reviewed the results from the time of this admission to 12/2/2021 11:09 EST     Results from last 7 days   Lab Units 12/01/21  0636 11/30/21  0153 11/29/21 2121   SODIUM mmol/L 132* 132* 132*   POTASSIUM mmol/L 4.5 5.0 5.0   CHLORIDE mmol/L 101 100 97*   CO2 mmol/L 22.6 23.2 24.1   BUN mg/dL 41* 41* 40*   CREATININE mg/dL 3.76* 4.02* 4.22*   CALCIUM mg/dL 7.8* 7.6* 7.9*   BILIRUBIN mg/dL  --   --  0.4   ALK PHOS U/L  --   --  88   ALT (SGPT) U/L  --   --  12   AST (SGOT) U/L  --   --  23   GLUCOSE mg/dL 100* 293* 292*       Estimated Creatinine Clearance: 14.4 mL/min (A) (by C-G formula based on SCr of 3.76 mg/dL (H)).                Results from last 7 days   Lab Units 12/01/21  0636 11/30/21  1525 11/30/21  0552 11/29/21 2121   WBC 10*3/mm3 12.16*  --  8.49 8.88   HEMOGLOBIN g/dL 10.0* 8.2* 6.9* 8.7*   PLATELETS 10*3/mm3 184  --  130* 167             Assessment / Plan     ASSESSMENT:  1.  MAYUR vs MAYUR/CKD3, with UOP not quantified, minimally improved as of yesterday.  Labs this morning still pending.  MAYUR multifactorial:  prerenal --> ATN due to N/V, poor oral intake with ongoing weight loss, +/- ABLA, vasospasm from HTN urgency, and compromised renal autoregulation due to ARB;  unsure whether left hydronephrosis significant enough to be playing a role.  FENa >1%.   2.  Facial swelling and confusion, both improving.  Probably due to myxedema from severe hypothyroidism.  In light of extremely elevated TSH, ARB as culprit for facial swelling is much less likely  3.  DM2 with poor control  4.  Anemia with dramatic drop in hemoglobin  5.  Hypertension  6.  Type II non-NSTEMI  7.  Severe hypothyroidism.  This is probably driving her mild hyponatremia as well  8.  Medication noncompliance  9.  Confusion    PLAN:  1.  Follow-up morning labs  2.  MRI of head  3.  Halve amlodipine dose to avoid  over-control of blood pressure.  Place hold orders on it and hydralazine      Thank you for involving us in the care of Zaina Martinez.  Please feel free to call with any questions.    Alejo Lange MD  12/02/21  11:09 [facility]    [facility]  [phone]      Much of this encounter note is an electronic transcription/translation of spoken language to printed text. The electronic translation of spoken language may permit erroneous, or at times, nonsensical words or phrases to be inadvertently transcribed; Although I have reviewed the note for such errors, some may still exist.

## 2021-12-02 NOTE — NURSING NOTE
1530 today called Rapid Response r/t new confusion, AMS of pt.  at nurses desk when I came out of another pt room and stated pt lying on bed sideways and is confused. Went into pt room she was arousable but would not follow commands and was not oriented x3. VSS, Blood sugar WNL, Rapid response team evaluated pt and asked  many questions and recommended contacting Dr. Yo for additional orders. Dr. Yo contacted and he ordered Neuro consult. Dr. Fabian saw and ordered MRI Brain. About one hour after rapid called pt became more alert and not as agitated, but did not remember any of the events earlier in the day. Will continue to monitor and follow md orders

## 2021-12-02 NOTE — PROGRESS NOTES
Patient Identification:  NAME:  Zaina Martinez  Age:  56 y.o.   Sex:  female   :  1965   MRN:  [id number]       Chief complaint: Metabolic encephalopathy, hypertensive emergency confusion renal failure    History of present illness: He looks about the same to me as yesterday she is awake talks to me and is oriented x3 moving all extremities MRI with and without contrast is still pending results      Past medical history:  Past Medical History:   Diagnosis Date   • Diabetes (HCC)    • Disease of thyroid gland    • GERD (gastroesophageal reflux disease)    • Hypertension    • Rheumatoid arthritis (HCC)        Allergies:  Patient has no known allergies.    Home medications:  Medications Prior to Admission   Medication Sig Dispense Refill Last Dose   • aspirin 81 MG chewable tablet Chew 81 mg Daily.   2021 at Unknown time   • escitalopram (LEXAPRO) 20 MG tablet Take 20 mg by mouth Daily.   2021 at Unknown time   • glucose blood (Accu-Chek Liz Plus) test strip 4 (Four) Times a Day.   2021 at Unknown time   • glucose blood (Accu-Chek Guide) test strip 1 each by Other route 4 (Four) Times a Day.   2021 at Unknown time   • insulin lispro (humaLOG) 100 UNIT/ML injection Inject  under the skin into the appropriate area as directed 3 (Three) Times a Day.   2021 at Unknown time   • levothyroxine (SYNTHROID, LEVOTHROID) 175 MCG tablet Take 175 mcg by mouth Daily.   2021 at Unknown time   • losartan (COZAAR) 50 MG tablet TAKE ONE TABLET BY MOUTH EVERY DAY AS DIRECTED- DUE FOR CHECK UP   2021 at Unknown time   • pantoprazole (PROTONIX) 40 MG EC tablet Take 40 mg by mouth Daily.   2021 at Unknown time   • rOPINIRole (REQUIP) 0.25 MG tablet Take 3 tablets by mouth every night at bedtime.   2021 at Unknown time   • sertraline (ZOLOFT) 100 MG tablet Take 100 mg by mouth Daily.   2021 at Unknown time   • temazepam (RESTORIL) 7.5 MG capsule Take 7.5 mg by mouth.    Past Week at Unknown time        Hospital medications:  [START ON 12/3/2021] amLODIPine, 2.5 mg, Oral, Q24H  aspirin, 81 mg, Oral, Daily  escitalopram, 20 mg, Oral, Daily  hydrALAZINE, 50 mg, Oral, Q8H  insulin glargine, 10 Units, Subcutaneous, QAM  insulin lispro, 0-9 Units, Subcutaneous, 4x Daily With Meals & Nightly  Levothyroxine Sodium, 100 mcg, Intravenous, Daily  melatonin, 3 mg, Oral, Nightly  metoprolol tartrate, 25 mg, Oral, Q12H  pantoprazole, 40 mg, Oral, Daily  rOPINIRole, 0.75 mg, Oral, Nightly  sertraline, 100 mg, Oral, Daily  sodium chloride, 10 mL, Intravenous, Q12H      sodium chloride, 100 mL/hr, Last Rate: 100 mL/hr (12/02/21 1356)      •  acetaminophen **OR** [DISCONTINUED] acetaminophen **OR** [DISCONTINUED] acetaminophen  •  dextrose  •  dextrose  •  glucagon (human recombinant)  •  labetalol  •  nitroglycerin  •  prochlorperazine  •  [COMPLETED] Insert peripheral IV **AND** sodium chloride  •  sodium chloride      Objective:  Vitals Ranges:   Temp:  [97.9 °F (36.6 °C)-98.5 °F (36.9 °C)] 98.1 °F (36.7 °C)  Heart Rate:  [53-63] 53  Resp:  [16-20] 20  BP: (111-171)/() 152/99      Physical Exam:  Awake alert laughing and arguing with her  in a joking manner normal cranial nerves II through VII tongue is midline excellent motor no pronator drift reflexes trace toes downgoing    Results review:   I reviewed the patient's new clinical results.    Data review:  Lab Results (last 24 hours)     Procedure Component Value Units Date/Time    Urine Drug Screen - Urine, Clean Catch [[id number]]  (Abnormal) Collected: 12/02/21 1410    Specimen: Urine, Clean Catch Updated: 12/02/21 1455     Amphet/Methamphet, Screen Negative     Barbiturates Screen, Urine Negative     Benzodiazepine Screen, Urine Positive     Cocaine Screen, Urine Negative     Opiate Screen Negative     THC, Screen, Urine Negative     Methadone Screen, Urine Negative     Oxycodone Screen, Urine Positive    Narrative:      Negative  Thresholds Per Drugs Screened:    Amphetamines                 500 ng/ml  Barbiturates                 200 ng/ml  Benzodiazepines              100 ng/ml  Cocaine                      300 ng/ml  Methadone                    300 ng/ml  Opiates                      300 ng/ml  Oxycodone                    100 ng/ml  THC                           50 ng/ml    The Normal Value for all drugs tested is negative. This report includes final unconfirmed screening results to be used for medical treatment purposes only. Unconfirmed results must not be used for non-medical purposes such as employment or legal testing. Clinical consideration should be applied to any drug of abuse test, particularly when unconfirmed results are used.            T4, Free [[id number]]  (Abnormal) Collected: 12/02/21 1147    Specimen: Blood Updated: 12/02/21 1334     Free T4 0.90 ng/dL     Narrative:      Results may be falsely increased if patient taking Biotin.      Renal Function Panel [[id number]]  (Abnormal) Collected: 12/02/21 1147    Specimen: Blood Updated: 12/02/21 1327     Glucose 74 mg/dL      BUN 42 mg/dL      Creatinine 4.22 mg/dL      Sodium 134 mmol/L      Potassium 4.8 mmol/L      Comment: Slight hemolysis detected by analyzer. Results may be affected.        Chloride 105 mmol/L      CO2 18.7 mmol/L      Calcium 7.2 mg/dL      Albumin 2.10 g/dL      Phosphorus 5.6 mg/dL      Anion Gap 10.3 mmol/L      BUN/Creatinine Ratio 10.0     eGFR Non African Amer 11 mL/min/1.73      Comment: <15 Indicative of kidney failure.        eGFR   Amer --     Comment: <15 Indicative of kidney failure.       Narrative:      GFR Normal >60  Chronic Kidney Disease <60  Kidney Failure <15      Magnesium [[id number]]  (Abnormal) Collected: 12/02/21 1147    Specimen: Blood Updated: 12/02/21 1327     Magnesium 1.5 mg/dL     CBC & Differential [[id number]]  (Abnormal) Collected: 12/02/21 1147    Specimen: Blood Updated: 12/02/21 1247    Narrative:      The  following orders were created for panel order CBC & Differential.  Procedure                               Abnormality         Status                     ---------                               -----------         ------                     CBC Auto Differential[[id number]]        Abnormal            Final result                 Please view results for these tests on the individual orders.    CBC Auto Differential [[id number]]  (Abnormal) Collected: 12/02/21 1147    Specimen: Blood Updated: 12/02/21 1247     WBC 10.56 10*3/mm3      RBC 3.27 10*6/mm3      Hemoglobin 9.2 g/dL      Hematocrit 27.8 %      MCV 85.0 fL      MCH 28.1 pg      MCHC 33.1 g/dL      RDW 15.7 %      RDW-SD 48.7 fl      MPV 10.7 fL      Platelets 170 10*3/mm3      Neutrophil % 65.3 %      Lymphocyte % 23.7 %      Monocyte % 8.2 %      Eosinophil % 1.7 %      Basophil % 0.4 %      Immature Grans % 0.7 %      Neutrophils, Absolute 6.90 10*3/mm3      Lymphocytes, Absolute 2.50 10*3/mm3      Monocytes, Absolute 0.87 10*3/mm3      Eosinophils, Absolute 0.18 10*3/mm3      Basophils, Absolute 0.04 10*3/mm3      Immature Grans, Absolute 0.07 10*3/mm3      nRBC 0.0 /100 WBC     POC Glucose Once [[id number]]  (Normal) Collected: 12/02/21 1114    Specimen: Blood Updated: 12/02/21 1118     Glucose 80 mg/dL      Comment: Meter: [id number] : [id number] Los SAAVEDRA       Clostridium Difficile Toxin, PCR - Stool, Per Rectum [[id number]]  (Normal) Collected: 12/02/21 0813    Specimen: Stool from Per Rectum Updated: 12/02/21 0913     C. Difficile Toxins by PCR Negative    POC Glucose Once [[id number]]  (Normal) Collected: 12/02/21 0608    Specimen: Blood Updated: 12/02/21 0610     Glucose 130 mg/dL      Comment: Meter: [id number] : [id number] Jose SAAVEDRA       POC Glucose Once [[id number]]  (Abnormal) Collected: 12/01/21 2055    Specimen: Blood Updated: 12/01/21 2056     Glucose 149 mg/dL      Comment: Meter: [id number] : [id number] Bright Jenkins  [employer]       POC Glucose Once [[id number]]  (Normal) Collected: 12/01/21 1654    Specimen: Blood Updated: 12/01/21 1657     Glucose 129 mg/dL      Comment: Meter: [id number] : [id number] Aureliano Ang ELISSA Tech       POC Glucose Once [[id number]]  (Abnormal) Collected: 12/01/21 1526    Specimen: Blood Updated: 12/01/21 1529     Glucose 148 mg/dL      Comment: Meter: [id number] : [id number] Henok Best RN              Imaging:  Imaging Results (Last 24 Hours)     Procedure Component Value Units Date/Time    MRI Brain With & Without Contrast [[id number]] Resulted: 12/02/21 1029     Updated: 12/02/21 1322    CT Abdomen Pelvis Without Contrast [[id number]] Collected: 12/02/21 0842     Updated: 12/02/21 0857    Narrative:      CT ABDOMEN AND PELVIS WITHOUT IV CONTRAST     HISTORY: Hydronephrosis     TECHNIQUE: Radiation dose reduction techniques were utilized, including  automated exposure control and exposure modulation based on body size.  Axial images were obtained through the abdomen and pelvis without the  administration of IV contrast. Coronal and sagittal reformatted images  were obtained.     COMPARISON: Renal ultrasound 11/30/2021     FINDINGS:      ABDOMEN:  Mild scarring/atelectasis in the lung bases. Cholelithiasis. The liver  and spleen are unremarkable. Minimal left hydronephrosis is seen. There  is no obvious obstructing stone or lesion. No hydronephrosis on the  right. Renal vascular calcifications are present. The adrenal glands and  pancreas are unremarkable. There is some edema in the mesentery. Only  extremely minimal ascites is seen in the right paracolic gutter.  Anasarca. There are multiple thickened loops of small bowel present  within the mid and left abdomen, suggesting enteritis. No evidence for  bowel obstruction.     PELVIS:  There is diffuse thickening of the bladder, nonspecific. Minimal ascites  in the pelvis. Colon is unremarkable. No acute bony abnormality.       Impression:      1.  Minimal left hydronephrosis without obvious obstruction. Bladder wall  is diffusely thickened, which may be the cause of the minimal  hydronephrosis  2. Multiple thickened small bowel loops within the mid and left abdomen  suggesting enteritis. No evidence for bowel obstruction  3. Edema within the mesentery and very minimal ascites. Diffuse  anasarca.  4. Cholelithiasis     Radiation dose reduction techniques were utilized, including automated  exposure control and exposure modulation based on body size.     This report was finalized on 12/2/2021 8:54 AM by Dr. Bijan Ordoñez M.D.            PPE worn at all times washed before washed up afterwards disposed of everything properly was now within 6 feet of her for more than a few minutes    Assessment and Plan:       MAYUR (acute kidney injury) (HCC)    HTN (hypertension)    Hypothyroidism    Type 2 diabetes mellitus with hyperglycemia, with long-term current use of insulin (HCC)    Rheumatoid arthritis (HCC)    Angioedema    Anemia    Medically noncompliant    Myocardial infarction due to demand ischemia (HCC)    Her metabolic encephalopathy appears stable.  She is awake and moderately alert and oriented x3 now she has had the MRI and they requested contrast so I have written for that as well after assurance from the radiologist that it will be safe for her renal function.  So we are waiting on the MRI with and without contrast results.  Given the severe elevation of blood pressure I am still concerned that she may have had a posterior reversible encephalopathic syndrome  She had some sudden change in mental status yesterday and we have still not explained that unless it was a brief seizure I am going to order an EEG for in a.m.      Adonis Fabian MD  12/02/21  14:56 EST

## 2021-12-02 NOTE — NURSING NOTE
Rin, RN from ER told RN that staff attempted to have MRI of brain done, however pt was confused and uncooperative, unable to have it done. RIKA Cherry from [facility] is aware and ok to have it done today when able.

## 2021-12-02 NOTE — PROGRESS NOTES
LOS: 3 days   Patient Care Team:  Provider, No Known as PCP - General    Chief Complaint:     F/u hypertension    Interval History:     She has no chest pain or pressure.  She has no difficulty breathing.  She does not feel her heart racing or skipping.    Echo 11/30/21  Interpretation Summary    · Calculated left ventricular EF = 61.8% Estimated left ventricular EF was in agreement with the calculated left ventricular EF. Left ventricular systolic function is normal.  · Left ventricular diastolic function is consistent with (grade II w/high LAP) pseudonormalization.  · The left atrial cavity is mildly dilated.  · Trace tricuspid valve regurgitation is present. Calculated right ventricular systolic pressure from tricuspid regurgitation is 21.4 mmHg.        Objective   Vital Signs  Temp:  [97.9 °F (36.6 °C)-98.5 °F (36.9 °C)] 98.5 °F (36.9 °C)  Heart Rate:  [53-63] 53  Resp:  [16-18] 18  BP: (111-186)/() 122/72    Intake/Output Summary (Last 24 hours) at 12/2/2021 1216  Last data filed at 12/2/2021 0615  Gross per 24 hour   Intake 432 ml   Output --   Net 432 ml       Comfortable NAD  Neck supple, no JVD or thyromegaly appreciated  S1/S2 RRR, no m/r/g  Lungs CTA B, normal effort  Abdomen S/NT/ND (+) BS, no HSM appreciated  Extremities warm, no clubbing, cyanosis, or edema  No visible or palpable skin lesions  A/Ox4, mood and affect appropriate    Results Review:      Results from last 7 days   Lab Units 12/01/21  0636 11/30/21  0153 11/30/21  0153 11/29/21 2121 11/29/21 2121   SODIUM mmol/L 132*  --  132*  --  132*   POTASSIUM mmol/L 4.5  --  5.0  --  5.0   CHLORIDE mmol/L 101  --  100  --  97*   CO2 mmol/L 22.6  --  23.2  --  24.1   BUN mg/dL 41*  --  41*  --  40*   CREATININE mg/dL 3.76*  --  4.02*  --  4.22*   GLUCOSE mg/dL 100*   < > 293*   < > 292*   CALCIUM mg/dL 7.8*  --  7.6*  --  7.9*    < > = values in this interval not displayed.     Results from last 7 days   Lab Units 11/30/21  4673  11/29/21 2121   TROPONIN T ng/mL 0.117* 0.132*     Results from last 7 days   Lab Units 12/01/21  0636 11/30/21  1525 11/30/21  0552 11/29/21 2121 11/29/21 2121   WBC 10*3/mm3 12.16*  --  8.49  --  8.88   HEMOGLOBIN g/dL 10.0* 8.2* 6.9*   < > 8.7*   HEMATOCRIT % 29.9* 24.5* 20.6*   < > 25.7*   PLATELETS 10*3/mm3 184  --  130*  --  167    < > = values in this interval not displayed.                       I reviewed the patient's new clinical results.  I personally viewed and interpreted the patient's EKG/Telemetry data        Medication Review:   [START ON 12/3/2021] amLODIPine, 2.5 mg, Oral, Q24H  aspirin, 81 mg, Oral, Daily  escitalopram, 20 mg, Oral, Daily  hydrALAZINE, 50 mg, Oral, Q8H  insulin glargine, 10 Units, Subcutaneous, QAM  insulin lispro, 0-9 Units, Subcutaneous, 4x Daily With Meals & Nightly  Levothyroxine Sodium, 100 mcg, Intravenous, Daily  melatonin, 3 mg, Oral, Nightly  metoprolol tartrate, 25 mg, Oral, Q12H  pantoprazole, 40 mg, Oral, Daily  rOPINIRole, 0.75 mg, Oral, Nightly  sertraline, 100 mg, Oral, Daily  sodium chloride, 10 mL, Intravenous, Q12H        sodium chloride, 50 mL/hr, Last Rate: 50 mL/hr (12/02/21 0104)        Assessment/Plan       MAYUR (acute kidney injury) (HCC)    HTN (hypertension)    Hypothyroidism    Type 2 diabetes mellitus with hyperglycemia, with long-term current use of insulin (HCC)    Rheumatoid arthritis (HCC)    Angioedema    Anemia    Medically noncompliant    Myocardial infarction due to demand ischemia (HCC)    1. Facial swelling, really periorbital swelling and not so much angioedema as her lips, tongue and throat do not seem to be affected with swelling.  I am not really convinced that this is an ACE angioedema.  Likely this is due to her severe hypothyroidism and it is likely safe in the future to try to reintroduce an ACE or an ARB in the setting.  2. Hypertensive emergency, initiate metoprolol 25 twice daily and amlodipine 2.5 mg daily.  If needed, could go  on a Cardene drip.  Blood pressure still high, somewhat limited on medications use as her heart rate is running in the 60s, consider low-dose hydralazine for now.  As her renal function improves, would reintroduce ACE or ARB.  3. Severe anemia, getting transfused, hemoccult stool (-) 11/30/21.   4. CKD with MAYUR with obstructive left uropathy now noted on renal ultrasound - nephrology and urology seeing.   5. Diabetes mellitus, poorly controlled.   6. History rheumatoid arthritis  7. Hypothyroidism  8. Elevated troponin in the setting of hypertensive emergency, acute kidney injury and anemia.  This is likely a type II non-STEMI.  Normal echocardiogram-no further work-up needed at this time as she has no active anginal chest pain.  She will need to be seen as an outpatient though because of her cardiac risks.  9. Nausea and vomiting at home with weight loss- will see if resolves by treating other pathologies, it not, may need w/u.  10. Severe hypothyroidism, likely noncompliant with medications.  This could be the source of her facial edema and not lisinopril.      Stable cardiac status, we will see as needed.    Ania Agrawal MD  12/02/21  12:16 EST

## 2021-12-02 NOTE — PROGRESS NOTES
"Adult Nutrition  Assessment/PES    Patient Name:  Zaina Martinez  YOB: 1965  MRN: [id number]  Admit Date:  11/29/2021    Assessment Date:  12/2/2021    Comments:  Nutrition assessment complete due to MST score of 4. EMR reviewed. Pt currently off unit for MRI . Has been having diarrhea. On full liquids, po intake not well documented.  Spoke with  at bedside. States she doesn't take care of her self and has lost about 30 lbs in the past 6 months or so and struggled with intake when she had Covid-19.  Will add some boost glucose control supple,nts with meals and follow clinical course, nutrition needs..       Reason for Assessment     Row Name 12/02/21 0953          Reason for Assessment    Reason For Assessment identified at risk by screening criteria     Diagnosis renal disease; gastrointestinal disease; cardiac disease  MAYUR,MI, DM, RA     Identified At Risk by Screening Criteria MST SCORE 2+                Nutrition/Diet History     Row Name 12/02/21 0955          Nutrition/Diet History    Typical Food/Fluid Intake per spouse - doesn't take care of her self.     Factors Affecting Nutritional Intake diarrhea                Anthropometrics     Row Name 12/02/21 0955          Anthropometrics    Height 157.5 cm (62\")            Admit Weight    Admit Weight 61.2 kg (135 lb)            Ideal Body Weight (IBW)    Ideal Body Weight (IBW) (kg) 50.43     % of Ideal Body Weight Assessment --  121% IBW            Usual Body Weight (UBW)    Usual Body Weight 74.8 kg (165 lb)     Weight Loss unintentional     Weight Loss Time Frame ~ 6 months per spourse            Body Mass Index (BMI)    BMI Assessment BMI 18.5-24.9: normal  BMI 24.7                Labs/Tests/Procedures/Meds     Row Name 12/02/21 0956          Labs/Procedures/Meds    Lab Results Reviewed reviewed, pertinent     Lab Results Comments glu, hgba1c, BUN, Cr,            Diagnostic Tests/Procedures    Diagnostic Test/Procedure Reviewed reviewed, " pertinent     Diagnostic Test/Procedures Comments CT Abd, SLP eval - passed reg/thin            Medications    Pertinent Medications Reviewed reviewed, pertinent     Pertinent Medications Comments asa, lantus, admelog, levothyroxine, protonix, IVF                Physical Findings     Row Name 12/02/21 0957          Physical Findings    Gastrointestinal diarrhea     Skin other (see comments)                  Nutrition Prescription Ordered     Row Name 12/02/21 0958          Nutrition Prescription PO    Current PO Diet Full Liquid                Evaluation of Received Nutrient/Fluid Intake     Row Name 12/02/21 0958          Fluid Intake Evaluation    IV Fluid (mL) 1200            PO Evaluation    Number of Days PO Intake Evaluated Insufficient Data             Problem/Interventions:   Problem 1     Row Name 12/02/21 0959          Nutrition Diagnoses Problem 1    Problem 1 Increased Nutrient Needs     Macronutrient Kcal; Protein     Signs/Symptoms (evidenced by) Report of Mnimal PO Intake; Other (comment); Unintended Weight Change  diarrhea                Intervention Goal     Row Name 12/02/21 1000          Intervention Goal    General Maintain nutrition; Disease management/therapy; Improved nutrition related lab(s); Reduce/improve symptoms; Meet nutritional needs for age/condition     PO Tolerate PO; Advance diet; Increase intake; PO intake (%)     PO Intake % 90 %     Weight Maintain weight                Nutrition Intervention     Row Name 12/02/21 1000          Nutrition Intervention    RD/Tech Action Care plan reviewd; Encourage intake; Follow Tx progress; Interview for preference; Recommend/ordered     Recommended/Ordered Supplement                Nutrition Prescription     Row Name 12/02/21 1048          Nutrition Prescription PO    PO Prescription Begin/change supplement     Supplement Boost Glucose Control     Supplement Frequency 3 times a day     New PO Prescription Ordered? Yes                 Education/Evaluation     Row Name 12/02/21 1000          Education    Education Will Instruct as appropriate            Monitor/Evaluation    Monitor Per protocol; Skin status; Symptoms; I&O; PO intake; Pertinent labs; Weight                 Electronically signed by:  Adriana Porter RD  12/02/21 10:49 EST

## 2021-12-03 ENCOUNTER — APPOINTMENT (OUTPATIENT)
Dept: NEUROLOGY | Facility: HOSPITAL | Age: 56
End: 2021-12-03

## 2021-12-03 PROBLEM — K52.9 ENTERITIS: Status: ACTIVE | Noted: 2021-12-03

## 2021-12-03 LAB
ALBUMIN SERPL-MCNC: 2.1 G/DL (ref 3.5–5.2)
ANION GAP SERPL CALCULATED.3IONS-SCNC: 9.8 MMOL/L (ref 5–15)
BASOPHILS # BLD AUTO: 0.05 10*3/MM3 (ref 0–0.2)
BASOPHILS NFR BLD AUTO: 0.4 % (ref 0–1.5)
BUN SERPL-MCNC: 40 MG/DL (ref 6–20)
BUN/CREAT SERPL: 11.3 (ref 7–25)
CALCIUM SPEC-SCNC: 7.2 MG/DL (ref 8.6–10.5)
CHLORIDE SERPL-SCNC: 103 MMOL/L (ref 98–107)
CO2 SERPL-SCNC: 19.2 MMOL/L (ref 22–29)
CREAT SERPL-MCNC: 3.54 MG/DL (ref 0.57–1)
DEPRECATED RDW RBC AUTO: 48.2 FL (ref 37–54)
EOSINOPHIL # BLD AUTO: 0.04 10*3/MM3 (ref 0–0.4)
EOSINOPHIL NFR BLD AUTO: 0.3 % (ref 0.3–6.2)
ERYTHROCYTE [DISTWIDTH] IN BLOOD BY AUTOMATED COUNT: 16.1 % (ref 12.3–15.4)
GFR SERPL CREATININE-BSD FRML MDRD: 13 ML/MIN/1.73
GFR SERPL CREATININE-BSD FRML MDRD: ABNORMAL ML/MIN/{1.73_M2}
GLUCOSE BLDC GLUCOMTR-MCNC: 120 MG/DL (ref 70–130)
GLUCOSE BLDC GLUCOMTR-MCNC: 180 MG/DL (ref 70–130)
GLUCOSE BLDC GLUCOMTR-MCNC: 195 MG/DL (ref 70–130)
GLUCOSE BLDC GLUCOMTR-MCNC: 57 MG/DL (ref 70–130)
GLUCOSE BLDC GLUCOMTR-MCNC: 65 MG/DL (ref 70–130)
GLUCOSE BLDC GLUCOMTR-MCNC: 85 MG/DL (ref 70–130)
GLUCOSE SERPL-MCNC: 105 MG/DL (ref 65–99)
HCT VFR BLD AUTO: 30.4 % (ref 34–46.6)
HGB BLD-MCNC: 10.3 G/DL (ref 12–15.9)
IMM GRANULOCYTES # BLD AUTO: 0.09 10*3/MM3 (ref 0–0.05)
IMM GRANULOCYTES NFR BLD AUTO: 0.7 % (ref 0–0.5)
LYMPHOCYTES # BLD AUTO: 1.69 10*3/MM3 (ref 0.7–3.1)
LYMPHOCYTES NFR BLD AUTO: 12.3 % (ref 19.6–45.3)
MCH RBC QN AUTO: 28 PG (ref 26.6–33)
MCHC RBC AUTO-ENTMCNC: 33.9 G/DL (ref 31.5–35.7)
MCV RBC AUTO: 82.6 FL (ref 79–97)
MONOCYTES # BLD AUTO: 0.81 10*3/MM3 (ref 0.1–0.9)
MONOCYTES NFR BLD AUTO: 5.9 % (ref 5–12)
NEUTROPHILS NFR BLD AUTO: 11.03 10*3/MM3 (ref 1.7–7)
NEUTROPHILS NFR BLD AUTO: 80.4 % (ref 42.7–76)
NRBC BLD AUTO-RTO: 0 /100 WBC (ref 0–0.2)
PHOSPHATE SERPL-MCNC: 5.3 MG/DL (ref 2.5–4.5)
PLATELET # BLD AUTO: 235 10*3/MM3 (ref 140–450)
PMV BLD AUTO: 10.9 FL (ref 6–12)
POTASSIUM SERPL-SCNC: 4.9 MMOL/L (ref 3.5–5.2)
RBC # BLD AUTO: 3.68 10*6/MM3 (ref 3.77–5.28)
SODIUM SERPL-SCNC: 132 MMOL/L (ref 136–145)
WBC NRBC COR # BLD: 13.71 10*3/MM3 (ref 3.4–10.8)

## 2021-12-03 PROCEDURE — 95816 EEG AWAKE AND DROWSY: CPT

## 2021-12-03 PROCEDURE — 95816 EEG AWAKE AND DROWSY: CPT | Performed by: STUDENT IN AN ORGANIZED HEALTH CARE EDUCATION/TRAINING PROGRAM

## 2021-12-03 PROCEDURE — 99221 1ST HOSP IP/OBS SF/LOW 40: CPT | Performed by: PHYSICIAN ASSISTANT

## 2021-12-03 PROCEDURE — 82962 GLUCOSE BLOOD TEST: CPT

## 2021-12-03 PROCEDURE — 85025 COMPLETE CBC W/AUTO DIFF WBC: CPT | Performed by: INTERNAL MEDICINE

## 2021-12-03 PROCEDURE — 80069 RENAL FUNCTION PANEL: CPT | Performed by: INTERNAL MEDICINE

## 2021-12-03 RX ORDER — AMLODIPINE BESYLATE 5 MG/1
5 TABLET ORAL
Status: DISCONTINUED | OUTPATIENT
Start: 2021-12-04 | End: 2021-12-10

## 2021-12-03 RX ORDER — LOPERAMIDE HYDROCHLORIDE 2 MG/1
2 CAPSULE ORAL 4 TIMES DAILY PRN
Status: DISCONTINUED | OUTPATIENT
Start: 2021-12-03 | End: 2021-12-17 | Stop reason: HOSPADM

## 2021-12-03 RX ORDER — LEVOTHYROXINE SODIUM 175 UG/1
175 TABLET ORAL
Status: DISCONTINUED | OUTPATIENT
Start: 2021-12-04 | End: 2021-12-10

## 2021-12-03 RX ORDER — INSULIN LISPRO 100 [IU]/ML
0-7 INJECTION, SOLUTION INTRAVENOUS; SUBCUTANEOUS
Status: DISCONTINUED | OUTPATIENT
Start: 2021-12-03 | End: 2021-12-17 | Stop reason: HOSPADM

## 2021-12-03 RX ADMIN — SODIUM CHLORIDE 100 ML/HR: 9 INJECTION, SOLUTION INTRAVENOUS at 06:45

## 2021-12-03 RX ADMIN — ASPIRIN 81 MG: 81 TABLET, CHEWABLE ORAL at 08:22

## 2021-12-03 RX ADMIN — METOPROLOL TARTRATE 25 MG: 25 TABLET, FILM COATED ORAL at 12:51

## 2021-12-03 RX ADMIN — Medication 3 MG: at 20:45

## 2021-12-03 RX ADMIN — SODIUM CHLORIDE 100 ML/HR: 9 INJECTION, SOLUTION INTRAVENOUS at 16:04

## 2021-12-03 RX ADMIN — SODIUM CHLORIDE, PRESERVATIVE FREE 10 ML: 5 INJECTION INTRAVENOUS at 08:22

## 2021-12-03 RX ADMIN — LOPERAMIDE HYDROCHLORIDE 2 MG: 2 CAPSULE ORAL at 15:19

## 2021-12-03 RX ADMIN — HYDRALAZINE HYDROCHLORIDE 50 MG: 50 TABLET, FILM COATED ORAL at 21:50

## 2021-12-03 RX ADMIN — AMLODIPINE BESYLATE 2.5 MG: 2.5 TABLET ORAL at 08:21

## 2021-12-03 RX ADMIN — LEVOTHYROXINE SODIUM 100 MCG: 20 INJECTION, SOLUTION INTRAVENOUS at 08:21

## 2021-12-03 RX ADMIN — HYDRALAZINE HYDROCHLORIDE 50 MG: 50 TABLET, FILM COATED ORAL at 15:19

## 2021-12-03 RX ADMIN — SERTRALINE 100 MG: 100 TABLET, FILM COATED ORAL at 08:21

## 2021-12-03 RX ADMIN — ROPINIROLE HYDROCHLORIDE 0.75 MG: 0.5 TABLET, FILM COATED ORAL at 20:45

## 2021-12-03 RX ADMIN — ESCITALOPRAM 20 MG: 20 TABLET, FILM COATED ORAL at 08:22

## 2021-12-03 RX ADMIN — SODIUM CHLORIDE, PRESERVATIVE FREE 10 ML: 5 INJECTION INTRAVENOUS at 20:44

## 2021-12-03 RX ADMIN — METOPROLOL TARTRATE 25 MG: 25 TABLET, FILM COATED ORAL at 20:44

## 2021-12-03 RX ADMIN — PANTOPRAZOLE SODIUM 40 MG: 40 TABLET, DELAYED RELEASE ORAL at 12:52

## 2021-12-03 RX ADMIN — HYDRALAZINE HYDROCHLORIDE 50 MG: 50 TABLET, FILM COATED ORAL at 06:23

## 2021-12-03 NOTE — PROGRESS NOTES
Name: Zaina Martinez ADMIT: 2021   : 1965  PCP: Provider, No Known    MRN: [id number] LOS: 4 days   AGE/SEX: 56 y.o. female  ROOM: [facility]/     Subjective   Subjective    She is sleepy today but awakens easily.  Falls back to sleep.  Complains of abdominal discomfort today.  She does not think it was present yesterday.  She has had diarrhea.       Objective   Objective   Vital Signs  Temp:  [98 °F (36.7 °C)-98.8 °F (37.1 °C)] 98.6 °F (37 °C)  Heart Rate:  [51-81] 81  Resp:  [18] 18  BP: (113-174)/() 138/104  SpO2:  [93 %-99 %] 99 %  on   ;   Device (Oxygen Therapy): room air  Body mass index is 24.69 kg/m².  Physical Exam  Constitutional:       General: She is not in acute distress.     Appearance: She is ill-appearing (Chronically).   HENT:      Head: Normocephalic.      Nose: Nose normal.      Mouth/Throat:      Mouth: Mucous membranes are moist.   Eyes:      Extraocular Movements: Extraocular movements intact.      Pupils: Pupils are equal, round, and reactive to light.   Cardiovascular:      Rate and Rhythm: Regular rhythm. Bradycardia present.      Pulses: Normal pulses.      Heart sounds: Normal heart sounds.   Pulmonary:      Effort: Pulmonary effort is normal. No respiratory distress.      Breath sounds: Normal breath sounds.   Abdominal:      General: Bowel sounds are normal. There is no distension.      Palpations: Abdomen is soft.   Musculoskeletal:      Cervical back: Normal range of motion and neck supple. No rigidity.   Skin:     General: Skin is warm and dry.      Coloration: Skin is not jaundiced.   Neurological:      General: No focal deficit present.      Mental Status: She is disoriented.   Psychiatric:         Cognition and Memory: Cognition is impaired. Memory is impaired.         Judgment: Judgment is inappropriate.         Results Review     I reviewed the patient's new clinical results.  Results from last 7 days   Lab Units 21  1018 21  1147 21  0636  11/30/21  1525 11/30/21  0552 11/30/21  0552   WBC 10*3/mm3 13.71* 10.56 12.16*  --   --  8.49   HEMOGLOBIN g/dL 10.3* 9.2* 10.0* 8.2*   < > 6.9*   PLATELETS 10*3/mm3 235 170 184  --   --  130*    < > = values in this interval not displayed.     Results from last 7 days   Lab Units 12/03/21  1018 12/02/21  1147 12/01/21  0636 11/30/21  0153   SODIUM mmol/L 132* 134* 132* 132*   POTASSIUM mmol/L 4.9 4.8 4.5 5.0   CHLORIDE mmol/L 103 105 101 100   CO2 mmol/L 19.2* 18.7* 22.6 23.2   BUN mg/dL 40* 42* 41* 41*   CREATININE mg/dL 3.54* 4.22* 3.76* 4.02*   GLUCOSE mg/dL 105* 74 100* 293*   EGFR IF NONAFRICN AM mL/min/1.73 13* 11* 12* 12*     Results from last 7 days   Lab Units 12/03/21  1018 12/02/21  1147 11/29/21 2121   ALBUMIN g/dL 2.10* 2.10* 2.70*   BILIRUBIN mg/dL  --   --  0.4   ALK PHOS U/L  --   --  88   AST (SGOT) U/L  --   --  23   ALT (SGPT) U/L  --   --  12     Results from last 7 days   Lab Units 12/03/21  1018 12/02/21  1147 12/01/21  0636 11/30/21  0153 11/29/21 2121 11/29/21  2121   CALCIUM mg/dL 7.2* 7.2* 7.8* 7.6*   < > 7.9*   ALBUMIN g/dL 2.10* 2.10*  --   --   --  2.70*   MAGNESIUM mg/dL  --  1.5*  --   --   --   --    PHOSPHORUS mg/dL 5.3* 5.6*  --   --   --   --     < > = values in this interval not displayed.        Results from last 7 days   Lab Units 11/30/21 2037 11/30/21  0552   IRON mcg/dL  --  39   IRON SATURATION %  --  21   TRANSFERRIN mg/dL  --  124*   TIBC mcg/dL  --  185*   OCCULT BLOOD, FECAL  Negative  --    TSH uIU/mL  --  152.000*     COVID19   Date Value Ref Range Status   11/29/2021 Not Detected Not Detected - Ref. Range Final     Glucose   Date/Time Value Ref Range Status   12/03/2021 1125 120 70 - 130 mg/dL Final     Comment:     Meter: [id number] : [id number] [place] SAAVEDRA   12/03/2021 0714 85 70 - 130 mg/dL Final     Comment:     Meter: [id number] : [id number] Scout HAWLEY RN   12/03/2021 0633 57 (L) 70 - 130 mg/dL Final     Comment:     Meter: [id number]  : [id number] Jose Rossi NA   12/03/2021 0606 65 (L) 70 - 130 mg/dL Final     Comment:     Meter: [id number] : [id number] Jose Rossi NA   12/02/2021 2130 79 70 - 130 mg/dL Final     Comment:     Meter: [id number] : [id number] Jose Rossi NA   12/02/2021 2038 58 (L) 70 - 130 mg/dL Final     Comment:     Meter: [id number] : [id number] Jose Rossi NA   12/02/2021 1639 82 70 - 130 mg/dL Final     Comment:     Meter: [id number] : [id number] [place] SAAVEDRA       EEG  Date of onset: 12/3/21 0855  Date of offset: 12/3/21 0922    Indication: altered mental status    Technical description:  This is a 21 channel digital EEG recording that   began on 0855 and ended on 0922.  Jesus and seizure detection software   programs were used.  There is significant artifact at P4 limiting interpretation of that area   throughout the study.  There are also occasional periods of generalized   muscle artifact limit interpretation during those periods.  Background:  A posterior dominant alpha rhythm is not present.  The   patient enters the drowsy state during the record.  The anterior   background is predominantly theta with occasional underlying delta.   hyperventilation and photic stimulation were not performed.  There are no   asymmetries between the two hemispheres.  No interictal activity is   present.    The EKG monitor shows a heart rate that varies between 50 and 70 beats per   minute.    Clinical Interpretation: Somewhat technically limited study due to muscle   artifact and P4 artifact.  With this in mind, this routine EEG recording   is abnormal with diffuse generalized slowing consistent with a mild to   moderate encephalopathy.  No potentially epileptogenic activity, seizure   activity, or focal slowing is present given technical limtations. Clinical   correlation recommended.  MRI Brain With & Without Contrast  Narrative: MRI EXAMINATION OF THE BRAIN WITH AND WITHOUT CONTRAST     HISTORY: Mental  status changes.     COMPARISON: None.     TECHNIQUE: A MRI examination of the brain was performed utilizing  sagittal T1, axial diffusion, T1, T2, T2 FLAIR and gradient echo T2  imaging. The patient returned to the department for sagittal, axial and  coronal T1 postcontrast weighted sequences.     FINDINGS: The study is hampered by patient motion.     There is no evidence of restricted diffusion to suggest acute  infarction. Areas of T2 FLAIR hyperintensity are appreciated involving  the periventricular white matter of the cerebral hemispheres bilaterally  most prominent in the parietal occipital regions, symmetrical. There is  mild increased signal intensity involving the deep white matter of the  cerebral hemispheres bilaterally.     Small foci of T2 FLAIR hyperintensity are appreciated involving the left  cerebellar hemisphere inferiorly and to a lesser extent medially on the  T2 FLAIR sequence. This is nonspecific. After contrast administration  there was no evidence of abnormal supratentorial enhancement. Pulsation  artifact limits evaluation of the posterior fossa but no convincing  enhancement involving the left cerebellar hemisphere was appreciated.     Impression: The study was hampered by patient motion. No evidence of  acute infarction. Areas of T2 FLAIR hyperintensity involving the  periventricular and to a lesser extent deep white matter of the cerebral  hemispheres bilaterally. Small foci of increased signal intensity  involving the white matter of the left cerebellar hemisphere was also  appreciated. There was no evidence of abnormal enhancement. The  appearance is nonspecific. The areas of T2 FLAIR hyperintensity  supratentorially may represent small vessel ischemic disease. The  cerebellar areas of T2 FLAIR hyperintensity are nonspecific. This may  relate to PRES. Clinical correlation and a follow-up MRI examination the  brain with and without contrast is suggested. There is no evidence of  abnormal  enhancement or hemosiderosis.     This report was finalized on 12/3/2021 11:00 AM by Dr. Osmar Nelson M.D.       Scheduled Medications  amLODIPine, 2.5 mg, Oral, Q24H  aspirin, 81 mg, Oral, Daily  escitalopram, 20 mg, Oral, Daily  hydrALAZINE, 50 mg, Oral, Q8H  insulin glargine, 10 Units, Subcutaneous, QAM  insulin lispro, 0-9 Units, Subcutaneous, 4x Daily With Meals & Nightly  Levothyroxine Sodium, 100 mcg, Intravenous, Daily  melatonin, 3 mg, Oral, Nightly  metoprolol tartrate, 25 mg, Oral, Q12H  pantoprazole, 40 mg, Oral, Daily  rOPINIRole, 0.75 mg, Oral, Nightly  sertraline, 100 mg, Oral, Daily  sodium chloride, 10 mL, Intravenous, Q12H    Infusions  sodium chloride, 100 mL/hr, Last Rate: 100 mL/hr (12/03/21 0645)    Diet  Diet Regular; Consistent Carbohydrate       Assessment/Plan     Active Hospital Problems    Diagnosis  POA   • **MAYUR (acute kidney injury) (HCC) [N17.9]  Yes   • Enteritis [K52.9]  Clinically Undetermined   • Medically noncompliant [Z91.19]  Not Applicable   • Myocardial infarction due to demand ischemia (HCC) [I21.A1]  Yes   • Angioedema [T78.3XXA]  Yes   • Anemia [D64.9]  Yes   • HTN (hypertension) [I10]  Yes   • Hypothyroidism [E03.9]  Yes   • Type 2 diabetes mellitus with hyperglycemia, with long-term current use of insulin (HCC) [E11.65, Z79.4]  Not Applicable   • Rheumatoid arthritis (HCC) [M06.9]  Yes      Resolved Hospital Problems    Diagnosis Date Resolved POA   • Hypertensive urgency [I16.0] 11/30/2021 Yes       56 y.o. female with MAYUR (acute kidney injury) (HCC).    CT scan yesterday showed possible enteritis.  Prior to today she has had no complaint of abdominal pain or diarrhea.  C. difficile toxin was ordered and negative.  Epic will not allow me to order GI PCR study.  WBC elevated this morning.  She is afebrile.  Will ask GI to see in consultation.  Will add Imodium for diarrhea.    Neurology following regarding periods of encephalopathy.  EEG showed no epileptic activity  and findings consistent with mild to moderate encephalopathy.  MRI brain showed no stroke though there were abnormal nonspecific findings which could be related to PRES.  Await neurology follow-up recommendations.  As stated yesterday reviewed records from previous admission at outside hospital for COVID-19.  She was admitted with GI symptoms at that time as well and had episodic confusion/agitation requiring Haldol.    Renal function slow to recover.  She has chronic kidney disease at baseline.  Continue IV fluids.    Will change her IV levothyroxine to oral.  Repeat TSH and free T4 on Monday.    Blood sugars low.  Will discontinue Lantus and change to low-dose SSI only for now.      · SCDs for DVT prophylaxis.  · Full code.  · Discussed with patient and nursing staff.  · Anticipate discharge home with family next week.      Fran Yo MD  [facility]  12/03/21  14:38 EST

## 2021-12-03 NOTE — PLAN OF CARE
Goal Outcome Evaluation:  Plan of Care Reviewed With: patient        Progress: improving  Outcome Summary: VSS. Bp a little better today. Pt more oriented. Creatinine trending down. EEG was abnormal. Will continue to monitor.

## 2021-12-03 NOTE — PLAN OF CARE
Goal Outcome Evaluation:  Plan of Care Reviewed With: patient        Progress: no change  Outcome Summary: Pt restless at times, gets tangled up in lines. Slept most of the night. Plan for EEG today. VSS.

## 2021-12-03 NOTE — PLAN OF CARE
Goal Outcome Evaluation:  Plan of Care Reviewed With: patient        Progress: no change  Outcome Summary: Pt had MRI with contrast and CT of abd and pelvis done. Nguyễn cath placed. c.diff neg. IV fluids increased to 100ml/hr. Will continue to monitor.

## 2021-12-03 NOTE — PROGRESS NOTES
"   LOS: 4 days   Patient Care Team:  Provider, No Known as PCP - General      Subjective   Interval History: CT no stone  Mild bladder wall thickening  No pain  Nephrology seeing, burch placed and on IVF  today's labs pending    Objective         Vital Signs  Temp:  [98 °F (36.7 °C)-98.3 °F (36.8 °C)] 98 °F (36.7 °C)  Heart Rate:  [51-62] 51  Resp:  [18-20] 18  BP: (113-174)/(72-99) 174/92      Intake/Output Summary (Last 24 hours) at 12/3/2021 0748  Last data filed at 12/3/2021 0557  Gross per 24 hour   Intake 1000 ml   Output 450 ml   Net 550 ml       Flowsheet Rows      First Filed Value   Admission Height 157.5 cm (62\") Documented at 11/29/2021 2011   Admission Weight 59 kg (130 lb) Documented at 11/30/2021 0200          Physical Exam:     General appearance: alert, cooperative, oriented  Lungs:  Clear, No acute distress  Heart:  Regular, rate and rhythm  ABD: soft and nt  Genitalia:  normal  Extremities: normal, no edema  Skin:  Skin color, texture, turgor normal, no rashes        Results Review:      Lab Results (all)     Procedure Component Value Units Date/Time    POC Glucose Once [[id number]]  (Normal) Collected: 12/03/21 0714    Specimen: Blood Updated: 12/03/21 0716     Glucose 85 mg/dL      Comment: Meter: [id number] : [id number] Scout HAWLEY RN       POC Glucose Once [[id number]]  (Abnormal) Collected: 12/03/21 0633    Specimen: Blood Updated: 12/03/21 0637     Glucose 57 mg/dL      Comment: Meter: [id number] : [id number] Jose Enid NA       POC Glucose Once [[id number]]  (Abnormal) Collected: 12/03/21 0606    Specimen: Blood Updated: 12/03/21 0613     Glucose 65 mg/dL      Comment: Meter: [id number] : [id number] Jose Enid NA       POC Glucose Once [[id number]]  (Normal) Collected: 12/02/21 2130    Specimen: Blood Updated: 12/02/21 2132     Glucose 79 mg/dL      Comment: Meter: [id number] : [id number] Jose Enid NA       POC Glucose Once [[id number]]  (Abnormal) Collected: 12/02/21 " 2038    Specimen: Blood Updated: 12/02/21 2039     Glucose 58 mg/dL      Comment: Meter: [id number] : [id number] Jose SAAVEDRA       POC Glucose Once [[id number]]  (Normal) Collected: 12/02/21 1639    Specimen: Blood Updated: 12/02/21 1642     Glucose 82 mg/dL      Comment: Meter: [id number] : [id number] Los SAAVEDRA       Urine Drug Screen - Urine, Clean Catch [[id number]]  (Abnormal) Collected: 12/02/21 1410    Specimen: Urine, Clean Catch Updated: 12/02/21 1455     Amphet/Methamphet, Screen Negative     Barbiturates Screen, Urine Negative     Benzodiazepine Screen, Urine Positive     Cocaine Screen, Urine Negative     Opiate Screen Negative     THC, Screen, Urine Negative     Methadone Screen, Urine Negative     Oxycodone Screen, Urine Positive    Narrative:      Negative Thresholds Per Drugs Screened:    Amphetamines                 500 ng/ml  Barbiturates                 200 ng/ml  Benzodiazepines              100 ng/ml  Cocaine                      300 ng/ml  Methadone                    300 ng/ml  Opiates                      300 ng/ml  Oxycodone                    100 ng/ml  THC                           50 ng/ml    The Normal Value for all drugs tested is negative. This report includes final unconfirmed screening results to be used for medical treatment purposes only. Unconfirmed results must not be used for non-medical purposes such as employment or legal testing. Clinical consideration should be applied to any drug of abuse test, particularly when unconfirmed results are used.            T4, Free [[id number]]  (Abnormal) Collected: 12/02/21 1147    Specimen: Blood Updated: 12/02/21 1334     Free T4 0.90 ng/dL     Narrative:      Results may be falsely increased if patient taking Biotin.      Renal Function Panel [[id number]]  (Abnormal) Collected: 12/02/21 1147    Specimen: Blood Updated: 12/02/21 1327     Glucose 74 mg/dL      BUN 42 mg/dL      Creatinine 4.22 mg/dL      Sodium 134 mmol/L       Potassium 4.8 mmol/L      Comment: Slight hemolysis detected by analyzer. Results may be affected.        Chloride 105 mmol/L      CO2 18.7 mmol/L      Calcium 7.2 mg/dL      Albumin 2.10 g/dL      Phosphorus 5.6 mg/dL      Anion Gap 10.3 mmol/L      BUN/Creatinine Ratio 10.0     eGFR Non African Amer 11 mL/min/1.73      Comment: <15 Indicative of kidney failure.        eGFR   Amer --     Comment: <15 Indicative of kidney failure.       Narrative:      GFR Normal >60  Chronic Kidney Disease <60  Kidney Failure <15      Magnesium [[id number]]  (Abnormal) Collected: 12/02/21 1147    Specimen: Blood Updated: 12/02/21 1327     Magnesium 1.5 mg/dL     CBC & Differential [[id number]]  (Abnormal) Collected: 12/02/21 1147    Specimen: Blood Updated: 12/02/21 1247    Narrative:      The following orders were created for panel order CBC & Differential.  Procedure                               Abnormality         Status                     ---------                               -----------         ------                     CBC Auto Differential[[id number]]        Abnormal            Final result                 Please view results for these tests on the individual orders.    CBC Auto Differential [[id number]]  (Abnormal) Collected: 12/02/21 1147    Specimen: Blood Updated: 12/02/21 1247     WBC 10.56 10*3/mm3      RBC 3.27 10*6/mm3      Hemoglobin 9.2 g/dL      Hematocrit 27.8 %      MCV 85.0 fL      MCH 28.1 pg      MCHC 33.1 g/dL      RDW 15.7 %      RDW-SD 48.7 fl      MPV 10.7 fL      Platelets 170 10*3/mm3      Neutrophil % 65.3 %      Lymphocyte % 23.7 %      Monocyte % 8.2 %      Eosinophil % 1.7 %      Basophil % 0.4 %      Immature Grans % 0.7 %      Neutrophils, Absolute 6.90 10*3/mm3      Lymphocytes, Absolute 2.50 10*3/mm3      Monocytes, Absolute 0.87 10*3/mm3      Eosinophils, Absolute 0.18 10*3/mm3      Basophils, Absolute 0.04 10*3/mm3      Immature Grans, Absolute 0.07 10*3/mm3      nRBC 0.0 /100 WBC      POC Glucose Once [[id number]]  (Normal) Collected: 12/02/21 1114    Specimen: Blood Updated: 12/02/21 1118     Glucose 80 mg/dL      Comment: Meter: [id number] : [id number] Losnancy Fuentes QUENTIN       Clostridium Difficile Toxin, PCR - Stool, Per Rectum [[id number]]  (Normal) Collected: 12/02/21 0813    Specimen: Stool from Per Rectum Updated: 12/02/21 0913     C. Difficile Toxins by PCR Negative    POC Glucose Once [[id number]]  (Normal) Collected: 12/02/21 0608    Specimen: Blood Updated: 12/02/21 0610     Glucose 130 mg/dL      Comment: Meter: [id number] : [id number] Jose SAAVEDRA       POC Glucose Once [[id number]]  (Abnormal) Collected: 12/01/21 2055    Specimen: Blood Updated: 12/01/21 2056     Glucose 149 mg/dL      Comment: Meter: [id number] : [id number] [facility]       POC Glucose Once [[id number]]  (Normal) Collected: 12/01/21 1654    Specimen: Blood Updated: 12/01/21 1657     Glucose 129 mg/dL      Comment: Meter: [id number] : [id number] Aureliano BOWERS [facility]       POC Glucose Once [[id number]]  (Abnormal) Collected: 12/01/21 1526    Specimen: Blood Updated: 12/01/21 1529     Glucose 148 mg/dL      Comment: Meter: [id number] : [id number] Henok Best RN       POC Glucose Once [[id number]]  (Abnormal) Collected: 12/01/21 1052    Specimen: Blood Updated: 12/01/21 1054     Glucose 153 mg/dL      Comment: Meter: [id number] : [id number] Srinivas Velazquez RN       Basic Metabolic Panel [[id number]]  (Abnormal) Collected: 12/01/21 0636    Specimen: Blood Updated: 12/01/21 0713     Glucose 100 mg/dL      BUN 41 mg/dL      Creatinine 3.76 mg/dL      Sodium 132 mmol/L      Potassium 4.5 mmol/L      Chloride 101 mmol/L      CO2 22.6 mmol/L      Calcium 7.8 mg/dL      eGFR   Amer --     Comment: <15 Indicative of kidney failure.        eGFR Non African Amer 12 mL/min/1.73      Comment: <15 Indicative of kidney failure.        BUN/Creatinine Ratio 10.9     Anion Gap 8.4 mmol/L      Narrative:      GFR Normal >60  Chronic Kidney Disease <60  Kidney Failure <15      CBC & Differential [[id number]]  (Abnormal) Collected: 12/01/21 0636    Specimen: Blood Updated: 12/01/21 0659    Narrative:      The following orders were created for panel order CBC & Differential.  Procedure                               Abnormality         Status                     ---------                               -----------         ------                     CBC Auto Differential[[id number]]        Abnormal            Final result                 Please view results for these tests on the individual orders.    CBC Auto Differential [[id number]]  (Abnormal) Collected: 12/01/21 0636    Specimen: Blood Updated: 12/01/21 0659     WBC 12.16 10*3/mm3      RBC 3.54 10*6/mm3      Hemoglobin 10.0 g/dL      Hematocrit 29.9 %      MCV 84.5 fL      MCH 28.2 pg      MCHC 33.4 g/dL      RDW 16.4 %      RDW-SD 51.0 fl      MPV 9.8 fL      Platelets 184 10*3/mm3      Neutrophil % 62.4 %      Lymphocyte % 27.0 %      Monocyte % 7.4 %      Eosinophil % 1.6 %      Basophil % 0.6 %      Immature Grans % 1.0 %      Neutrophils, Absolute 7.59 10*3/mm3      Lymphocytes, Absolute 3.28 10*3/mm3      Monocytes, Absolute 0.90 10*3/mm3      Eosinophils, Absolute 0.20 10*3/mm3      Basophils, Absolute 0.07 10*3/mm3      Immature Grans, Absolute 0.12 10*3/mm3      nRBC 0.0 /100 WBC     POC Glucose Once [[id number]]  (Abnormal) Collected: 11/30/21 2136    Specimen: Blood Updated: 11/30/21 2137     Glucose 168 mg/dL      Comment: Meter: [id number] : [id number] Desmond Iqbal RN       Occult Blood X 1, Stool - Stool, Per Rectum [[id number]]  (Normal) Collected: 11/30/21 2037    Specimen: Stool from Per Rectum Updated: 11/30/21 2108     Fecal Occult Blood Negative    Protein, Urine, Random - [[id number]] Collected: 11/30/21 1158    Specimen: Urine Updated: 11/30/21 1811     Total Protein, Urine 1,119.0 mg/dL     Narrative:      Reference intervals for  random urine have not been established.  Clinical usage is dependent upon physician's interpretation in combination with other laboratory tests.       POC Glucose Once [[id number]]  (Normal) Collected: 11/30/21 1606    Specimen: Blood Updated: 11/30/21 1608     Glucose 108 mg/dL      Comment: Meter: [id number] : [id number] Johann SAAVEDRA       Hemoglobin & Hematocrit, Blood [[id number]]  (Abnormal) Collected: 11/30/21 1525    Specimen: Blood Updated: 11/30/21 1551     Hemoglobin 8.2 g/dL      Hematocrit 24.5 %     POC Glucose Once [[id number]]  (Abnormal) Collected: 11/30/21 1313    Specimen: Blood Updated: 11/30/21 1315     Glucose 141 mg/dL      Comment: Meter: [id number] : [id number] Johann SAAVEDRA       Sodium, Urine, Random - [[id number]] Collected: 11/30/21 1158    Specimen: Urine Updated: 11/30/21 1301     Sodium, Urine 67 mmol/L     Narrative:      Reference intervals for random urine have not been established.  Clinical usage is dependent upon physician's interpretation in combination with other laboratory tests.       Creatinine, Urine, Random - [[id number]] Collected: 11/30/21 1158    Specimen: Urine Updated: 11/30/21 1301     Creatinine, Urine 64.6 mg/dL     Narrative:      Reference intervals for random urine have not been established.  Clinical usage is dependent upon physician's interpretation in combination with other laboratory tests.       TSH [[id number]]  (Abnormal) Collected: 11/30/21 0552    Specimen: Blood Updated: 11/30/21 1244     .000 uIU/mL     POC Glucose Once [[id number]]  (Abnormal) Collected: 11/30/21 0806    Specimen: Blood Updated: 11/30/21 0808     Glucose 264 mg/dL      Comment: Meter: [id number] : [id number] Milind Jordan RN       Hemoglobin A1c [[id number]]  (Abnormal) Collected: 11/30/21 0552    Specimen: Blood Updated: 11/30/21 0630     Hemoglobin A1C 9.78 %     Narrative:      Hemoglobin A1C Ranges:    Increased Risk for Diabetes  5.7% to 6.4%  Diabetes                      >= 6.5%  Diabetic Goal                < 7.0%    CBC (No Diff) [[id number]]  (Abnormal) Collected: 11/30/21 0552    Specimen: Blood Updated: 11/30/21 0629     WBC 8.49 10*3/mm3      RBC 2.51 10*6/mm3      Hemoglobin 6.9 g/dL      Hematocrit 20.6 %      MCV 82.1 fL      MCH 27.5 pg      MCHC 33.5 g/dL      RDW 16.2 %      RDW-SD 48.4 fl      MPV 9.7 fL      Platelets 130 10*3/mm3     Iron Profile [[id number]]  (Abnormal) Collected: 11/30/21 0552    Specimen: Blood Updated: 11/30/21 0620     Iron 39 mcg/dL      Iron Saturation 21 %      Transferrin 124 mg/dL      TIBC 185 mcg/dL     COVID PRE-OP / PRE-PROCEDURE SCREENING ORDER (NO ISOLATION) - Swab, Nasopharynx [[id number]]  (Normal) Collected: 11/29/21 2343    Specimen: Swab from Nasopharynx Updated: 11/30/21 0444    Narrative:      The following orders were created for panel order COVID PRE-OP / PRE-PROCEDURE SCREENING ORDER (NO ISOLATION) - Swab, Nasopharynx.  Procedure                               Abnormality         Status                     ---------                               -----------         ------                     COVID-19,APTIMA PANTHER(...[[id number]]  Normal              Final result                 Please view results for these tests on the individual orders.    COVID-19,APTIMA PANTHER(MED), NAZ, NP/OP SWAB IN UTM/VTM/SALINE TRANSPORT MEDIA,24 HR TAT - Swab, Nasopharynx [[id number]]  (Normal) Collected: 11/29/21 2343    Specimen: Swab from Nasopharynx Updated: 11/30/21 0444     COVID19 Not Detected    Narrative:      Fact sheet for providers: https://www.fda.gov/media/509760/download     Fact sheet for patients: https://www.fda.gov/media/744658/download    Test performed by RT PCR.    Troponin [[id number]]  (Abnormal) Collected: 11/30/21 0153    Specimen: Blood Updated: 11/30/21 0255     Troponin T 0.117 ng/mL     Narrative:      Troponin T Reference Range:  <= 0.03 ng/mL-   Negative for AMI  >0.03 ng/mL-     Abnormal for myocardial necrosis.   Clinicians would have to utilize clinical acumen, EKG, Troponin and serial changes to determine if it is an Acute Myocardial Infarction or myocardial injury due to an underlying chronic condition.       Results may be falsely decreased if patient taking Biotin.      Basic Metabolic Panel [[id number]]  (Abnormal) Collected: 11/30/21 0153    Specimen: Blood Updated: 11/30/21 0220     Glucose 293 mg/dL      BUN 41 mg/dL      Creatinine 4.02 mg/dL      Sodium 132 mmol/L      Potassium 5.0 mmol/L      Chloride 100 mmol/L      CO2 23.2 mmol/L      Calcium 7.6 mg/dL      eGFR   Amer --     Comment: <15 Indicative of kidney failure.        eGFR Non African Amer 12 mL/min/1.73      Comment: <15 Indicative of kidney failure.        BUN/Creatinine Ratio 10.2     Anion Gap 8.8 mmol/L     Narrative:      GFR Normal >60  Chronic Kidney Disease <60  Kidney Failure <15      Troponin [[id number]]  (Abnormal) Collected: 11/29/21 2121    Specimen: Blood Updated: 11/29/21 2216     Troponin T 0.132 ng/mL     Narrative:      Troponin T Reference Range:  <= 0.03 ng/mL-   Negative for AMI  >0.03 ng/mL-     Abnormal for myocardial necrosis.  Clinicians would have to utilize clinical acumen, EKG, Troponin and serial changes to determine if it is an Acute Myocardial Infarction or myocardial injury due to an underlying chronic condition.       Results may be falsely decreased if patient taking Biotin.      BNP [[id number]]  (Abnormal) Collected: 11/29/21 2121    Specimen: Blood Updated: 11/29/21 2205     proBNP 5,443.0 pg/mL     Narrative:      Among patients with dyspnea, NT-proBNP is highly sensitive for the detection of acute congestive heart failure. In addition NT-proBNP of <300 pg/ml effectively rules out acute congestive heart failure with 99% negative predictive value.    Results may be falsely decreased if patient taking Biotin.      Comprehensive Metabolic Panel [[id number]]  (Abnormal) Collected: 11/29/21 2121    Specimen: Blood  Updated: 11/29/21 2156     Glucose 292 mg/dL      BUN 40 mg/dL      Creatinine 4.22 mg/dL      Sodium 132 mmol/L      Potassium 5.0 mmol/L      Chloride 97 mmol/L      CO2 24.1 mmol/L      Calcium 7.9 mg/dL      Total Protein 5.4 g/dL      Albumin 2.70 g/dL      ALT (SGPT) 12 U/L      AST (SGOT) 23 U/L      Alkaline Phosphatase 88 U/L      Total Bilirubin 0.4 mg/dL      eGFR Non African Amer 11 mL/min/1.73      Comment: <15 Indicative of kidney failure.        eGFR   Amer --     Comment: <15 Indicative of kidney failure.        Globulin 2.7 gm/dL      A/G Ratio 1.0 g/dL      BUN/Creatinine Ratio 9.5     Anion Gap 10.9 mmol/L     Narrative:      GFR Normal >60  Chronic Kidney Disease <60  Kidney Failure <15      CBC & Differential [[id number]]  (Abnormal) Collected: 11/29/21 2121    Specimen: Blood Updated: 11/29/21 2135    Narrative:      The following orders were created for panel order CBC & Differential.  Procedure                               Abnormality         Status                     ---------                               -----------         ------                     CBC Auto Differential[[id number]]        Abnormal            Final result                 Please view results for these tests on the individual orders.    CBC Auto Differential [[id number]]  (Abnormal) Collected: 11/29/21 2121    Specimen: Blood Updated: 11/29/21 2135     WBC 8.88 10*3/mm3      RBC 3.14 10*6/mm3      Hemoglobin 8.7 g/dL      Hematocrit 25.7 %      MCV 81.8 fL      MCH 27.7 pg      MCHC 33.9 g/dL      RDW 15.9 %      RDW-SD 47.4 fl      MPV 10.1 fL      Platelets 167 10*3/mm3      Neutrophil % 82.0 %      Lymphocyte % 12.7 %      Monocyte % 3.9 %      Eosinophil % 0.5 %      Basophil % 0.3 %      Immature Grans % 0.6 %      Neutrophils, Absolute 7.28 10*3/mm3      Lymphocytes, Absolute 1.13 10*3/mm3      Monocytes, Absolute 0.35 10*3/mm3      Eosinophils, Absolute 0.04 10*3/mm3      Basophils, Absolute 0.03 10*3/mm3       Immature Grans, Absolute 0.05 10*3/mm3      nRBC 0.0 /100 WBC     POC Glucose Once [[id number]]  (Abnormal) Collected: 11/29/21 2006    Specimen: Blood Updated: 11/29/21 2008     Glucose 285 mg/dL      Comment: Meter: [id number] : [id number] Jacques Gomez RN             Imaging Results (All)     Procedure Component Value Units Date/Time    MRI Brain With & Without Contrast [[id number]] Collected: 12/02/21 1623     Updated: 12/02/21 1623    Narrative:      MRI EXAMINATION OF THE BRAIN WITH AND WITHOUT CONTRAST     HISTORY: Mental status changes.     COMPARISON: None.     TECHNIQUE: A MRI examination of the brain was performed utilizing  sagittal T1, axial diffusion, T1, T2, T2 FLAIR and gradient echo T2  imaging. The patient returned to the department for sagittal, axial and  coronal T1 postcontrast weighted sequences.     FINDINGS: The study is hampered by patient motion.     There is no evidence of restricted diffusion to suggest acute  infarction. Areas of T2 FLAIR hyperintensity are appreciated involving  the periventricular white matter of the cerebral hemispheres bilaterally  most prominent in the parietal occipital regions, symmetrical. There is  mild increased signal intensity involving the deep white matter of the  cerebral hemispheres bilaterally.     Small foci of T2 FLAIR hyperintensity are appreciated involving the left  cerebellar hemisphere inferiorly and to a lesser extent medially on the  T2 FLAIR sequence. This is nonspecific. After contrast administration  there was no evidence of abnormal supratentorial enhancement. Pulsation  artifact limits evaluation of the posterior fossa but no convincing  enhancement involving the left cerebellar hemisphere was appreciated.       Impression:      The study was hampered by patient motion. No evidence of  acute infarction. Areas of T2 FLAIR hyperintensity involving the  periventricular and to a lesser extent deep white matter of the cerebral  hemispheres  bilaterally. Small foci of increased signal intensity  involving the white matter of the left cerebellar hemisphere was also  appreciated. There was no evidence of abnormal enhancement. The  appearance is nonspecific. The areas of T2 FLAIR hyperintensity  supratentorially may represent small vessel ischemic disease. Small  vessel ischemic disease and/or small remote vascular insults involving  the left cerebral hemisphere may be responsible to the area of T2 FLAIR  hyperintensity as well. There is slight sulcal prominence suggested on  the axial T2 sequence at the same location. There is no evidence of  enhancement or hemosiderosis.     CHECK CHECK       CT Abdomen Pelvis Without Contrast [[id number]] Collected: 12/02/21 0842     Updated: 12/02/21 0857    Narrative:      CT ABDOMEN AND PELVIS WITHOUT IV CONTRAST     HISTORY: Hydronephrosis     TECHNIQUE: Radiation dose reduction techniques were utilized, including  automated exposure control and exposure modulation based on body size.  Axial images were obtained through the abdomen and pelvis without the  administration of IV contrast. Coronal and sagittal reformatted images  were obtained.     COMPARISON: Renal ultrasound 11/30/2021     FINDINGS:      ABDOMEN:  Mild scarring/atelectasis in the lung bases. Cholelithiasis. The liver  and spleen are unremarkable. Minimal left hydronephrosis is seen. There  is no obvious obstructing stone or lesion. No hydronephrosis on the  right. Renal vascular calcifications are present. The adrenal glands and  pancreas are unremarkable. There is some edema in the mesentery. Only  extremely minimal ascites is seen in the right paracolic gutter.  Anasarca. There are multiple thickened loops of small bowel present  within the mid and left abdomen, suggesting enteritis. No evidence for  bowel obstruction.     PELVIS:  There is diffuse thickening of the bladder, nonspecific. Minimal ascites  in the pelvis. Colon is unremarkable. No acute  bony abnormality.       Impression:      1. Minimal left hydronephrosis without obvious obstruction. Bladder wall  is diffusely thickened, which may be the cause of the minimal  hydronephrosis  2. Multiple thickened small bowel loops within the mid and left abdomen  suggesting enteritis. No evidence for bowel obstruction  3. Edema within the mesentery and very minimal ascites. Diffuse  anasarca.  4. Cholelithiasis     Radiation dose reduction techniques were utilized, including automated  exposure control and exposure modulation based on body size.     This report was finalized on 12/2/2021 8:54 AM by Dr. Bijan Ordoñez M.D.       US Renal Bilateral [[id number]] Collected: 11/30/21 0825     Updated: 11/30/21 0834    Narrative:      BILATERAL RENAL ULTRASOUND     CLINICAL HISTORY: Acute renal insufficiency. Hypertension.     The right kidney is normal in size and shape and shows no hydronephrosis  or masses. It measures 10.3 x 5.9 x 3.9 cm. The left kidney is also  normal in size and shape. It demonstrates mild left hydronephrosis. The  left kidney measures 9.9 x 5.1 x 4.5 cm. Images of the urinary bladder  demonstrate no obvious bladder masses. Doppler ultrasound shows evidence  of urine entering the bladder from the right ureteral orifice. No  definite urine was identified entering the bladder from the left  ureteral orifice. This supports a diagnosis of left ureteral  obstruction. Correlation with clinical findings is recommended. A CT  scan of the abdomen and pelvis may be helpful for further evaluation.     IMPRESSIONS: Findings consistent with left obstructive uropathy as  described. A CT scan of the abdomen and pelvis is suggested for further  evaluation.     This report was finalized on 11/30/2021 8:31 AM by Dr. Darien Barron M.D.       XR Chest 1 View [[id number]] Collected: 11/29/21 2154     Updated: 11/29/21 2158    Narrative:      SINGLE VIEW OF THE CHEST     HISTORY: Elevated blood-pressure shortness  of breath     COMPARISON: None available.     FINDINGS:  Heart size is within normal limits. Lungs appear clear. No pneumothorax,  pleural effusion, or acute infiltrate is seen.       Impression:      No acute findings.     This report was finalized on 11/29/2021 9:55 PM by Dr. Cherri Rodriguez M.D.             Medication Review:   Current Facility-Administered Medications   Medication Dose Route Frequency Provider Last Rate Last Admin   • acetaminophen (TYLENOL) tablet 650 mg  650 mg Oral Q4H PRN Farida Laboy APRN       • amLODIPine (NORVASC) tablet 2.5 mg  2.5 mg Oral Q24H Alejo Lange MD       • aspirin chewable tablet 81 mg  81 mg Oral Daily Farida Laboy APRN   81 mg at 12/02/21 1149   • dextrose (D50W) (25 g/50 mL) IV injection 25 g  25 g Intravenous Q15 Min PRN Farida Laboy APRN       • dextrose (GLUTOSE) oral gel 15 g  15 g Oral Q15 Min PRN Farida Laboy APRN       • escitalopram (LEXAPRO) tablet 20 mg  20 mg Oral Daily Farida Laboy APRN   20 mg at 12/02/21 1354   • glucagon (human recombinant) (GLUCAGEN DIAGNOSTIC) injection 1 mg  1 mg Subcutaneous PRN Farida Laboy APRN       • hydrALAZINE (APRESOLINE) tablet 50 mg  50 mg Oral Q8H Alejo Lange MD   50 mg at 12/03/21 0623   • insulin glargine (LANTUS, SEMGLEE) injection 10 Units  10 Units Subcutaneous Fran Carrasco MD   10 Units at 12/02/21 0614   • insulin lispro (ADMELOG) injection 0-9 Units  0-9 Units Subcutaneous 4x Daily With Meals & Nightly Farida Laboy APRN   2 Units at 12/01/21 1306   • labetalol (NORMODYNE,TRANDATE) injection 10 mg  10 mg Intravenous Q6H PRN Farida Laboy APRN   10 mg at 12/01/21 1340   • Levothyroxine Sodium injection 100 mcg  100 mcg Intravenous Daily Chao Fontanez MD   100 mcg at 12/02/21 1355   • melatonin tablet 3 mg  3 mg Oral Nightly Farida Laboy APRN   3 mg at 12/02/21 2030   • metoprolol tartrate (LOPRESSOR)  tablet 25 mg  25 mg Oral Q12H Ania Agrawal MD   25 mg at 12/02/21 2031   • nitroglycerin (NITROSTAT) SL tablet 0.4 mg  0.4 mg Sublingual Q5 Min PRN Farida Laboy APRN       • pantoprazole (PROTONIX) EC tablet 40 mg  40 mg Oral Daily Farida Laboy APRN   40 mg at 12/02/21 1149   • prochlorperazine (COMPAZINE) injection 5 mg  5 mg Intravenous Q6H PRN Farida Laboy APRN       • rOPINIRole (REQUIP) tablet 0.75 mg  0.75 mg Oral Nightly Farida Laboy APRN   0.75 mg at 12/02/21 2031   • sertraline (ZOLOFT) tablet 100 mg  100 mg Oral Daily Farida Laboy APRN   100 mg at 12/02/21 1149   • sodium chloride 0.9 % flush 10 mL  10 mL Intravenous PRN Dinesh Virk MD       • sodium chloride 0.9 % flush 10 mL  10 mL Intravenous Q12H Farida Laboy APRN   10 mL at 12/02/21 2031   • sodium chloride 0.9 % flush 10 mL  10 mL Intravenous PRN Farida Laboy APRN       • sodium chloride 0.9 % infusion  100 mL/hr Intravenous Continuous Alejo Lange  mL/hr at 12/03/21 0645 100 mL/hr at 12/03/21 0645       Current Facility-Administered Medications:   •  acetaminophen (TYLENOL) tablet 650 mg, 650 mg, Oral, Q4H PRN **OR** [DISCONTINUED] acetaminophen (TYLENOL) 160 MG/5ML solution 650 mg, 650 mg, Oral, Q4H PRN **OR** [DISCONTINUED] acetaminophen (TYLENOL) suppository 650 mg, 650 mg, Rectal, Q4H PRN, Farida Laboy APRN  •  amLODIPine (NORVASC) tablet 2.5 mg, 2.5 mg, Oral, Q24H, Alejo Lange MD  •  aspirin chewable tablet 81 mg, 81 mg, Oral, Daily, Farida Laboy APRN, 81 mg at 12/02/21 1149  •  dextrose (D50W) (25 g/50 mL) IV injection 25 g, 25 g, Intravenous, Q15 Min PRN, Farida Laboy APRN  •  dextrose (GLUTOSE) oral gel 15 g, 15 g, Oral, Q15 Min PRN, Farida Laboy APRN  •  escitalopram (LEXAPRO) tablet 20 mg, 20 mg, Oral, Daily, Farida Laboy APRN, 20 mg at 12/02/21 1354  •  glucagon (human recombinant)  (GLUCAGEN DIAGNOSTIC) injection 1 mg, 1 mg, Subcutaneous, PRN, Farida Laboy APRN  •  hydrALAZINE (APRESOLINE) tablet 50 mg, 50 mg, Oral, Q8H, Alejo Lange MD, 50 mg at 12/03/21 0623  •  insulin glargine (LANTUS, SEMGLEE) injection 10 Units, 10 Units, Subcutaneous, QAM, Fran Yo MD, 10 Units at 12/02/21 0614  •  insulin lispro (ADMELOG) injection 0-9 Units, 0-9 Units, Subcutaneous, 4x Daily With Meals & Nightly, Farida Laboy APRN, 2 Units at 12/01/21 1306  •  labetalol (NORMODYNE,TRANDATE) injection 10 mg, 10 mg, Intravenous, Q6H PRN, Farida Laboy APRN, 10 mg at 12/01/21 1340  •  Levothyroxine Sodium injection 100 mcg, 100 mcg, Intravenous, Daily, Chao Fontanez MD, 100 mcg at 12/02/21 1355  •  melatonin tablet 3 mg, 3 mg, Oral, Nightly, Farida Laboy APRN, 3 mg at 12/02/21 2030  •  metoprolol tartrate (LOPRESSOR) tablet 25 mg, 25 mg, Oral, Q12H, Ania Agrawal MD, 25 mg at 12/02/21 2031  •  nitroglycerin (NITROSTAT) SL tablet 0.4 mg, 0.4 mg, Sublingual, Q5 Min PRN, Farida Laboy APRN  •  pantoprazole (PROTONIX) EC tablet 40 mg, 40 mg, Oral, Daily, Farida Laboy APRN, 40 mg at 12/02/21 1149  •  prochlorperazine (COMPAZINE) injection 5 mg, 5 mg, Intravenous, Q6H PRN, Farida Laboy APRN  •  rOPINIRole (REQUIP) tablet 0.75 mg, 0.75 mg, Oral, Nightly, Farida Laboy APRN, 0.75 mg at 12/02/21 2031  •  sertraline (ZOLOFT) tablet 100 mg, 100 mg, Oral, Daily, Farida Laboy APRN, 100 mg at 12/02/21 1149  •  [COMPLETED] Insert peripheral IV, , , Once **AND** sodium chloride 0.9 % flush 10 mL, 10 mL, Intravenous, PRN, Dinesh Virk MD  •  sodium chloride 0.9 % flush 10 mL, 10 mL, Intravenous, Q12H, Farida Laboy APRN, 10 mL at 12/02/21 2031  •  sodium chloride 0.9 % flush 10 mL, 10 mL, Intravenous, PRN, Farida Laboy APRN  •  sodium chloride 0.9 % infusion, 100 mL/hr, Intravenous, Continuous, Nazario,  Alejo Beth MD, Last Rate: 100 mL/hr at 12/03/21 0645, 100 mL/hr at 12/03/21 0645  Medications Discontinued During This Encounter   Medication Reason   • amLODIPine (NORVASC) tablet 2.5 mg    • levothyroxine (SYNTHROID, LEVOTHROID) tablet 175 mcg    • Levothyroxine Sodium injection 100 mcg    • acetaminophen (TYLENOL) 160 MG/5ML solution 650 mg    • acetaminophen (TYLENOL) suppository 650 mg    • calcium carbonate (TUMS) chewable tablet 500 mg (200 mg elemental)    • hydrALAZINE (APRESOLINE) tablet 25 mg    • amLODIPine (NORVASC) tablet 5 mg        Assessment/Plan         MAYUR (acute kidney injury) (HCC)    HTN (hypertension)    Hypothyroidism    Type 2 diabetes mellitus with hyperglycemia, with long-term current use of insulin (HCC)    Rheumatoid arthritis (HCC)    Angioedema    Anemia    Medically noncompliant    Myocardial infarction due to demand ischemia (HCC)        Plan burch in place  On IVF  Labs pending    Bryant Phan Jr., MD  12/03/21  07:48 EST

## 2021-12-03 NOTE — CONSULTS
[facility]  Initial Inpatient Consult Note    Referring Provider: A     Reason for Consultation: Abdominal pain, diarrhea, enteritis    Subjective     History of present illness:      Thank you for allowing us to participate in the care of this patient.  56 y.o. female unknown to our service who was admitted on 11/29 for facial swelling, acute kidney injury, and hypertensive urgency.  She has history of GERD on PPI at home.  She also was found to be anemic down from baseline around 10.4.  She is having periods of encephalopathy with no evidence of stroke on MRI of the brain.    Service consulted for abdominal pain, diarrhea, and enteritis noted on CT scan.    Patient reports onset of diarrhea last night.  She has had at least 6-7 watery bowel movements today.  She has she denies abdominal pain.  With that said she grunted a little bit when I was palpating her abdomen.  I asked her where it hurt and she said she could not remember even though I repeated the palpation.  She reports that she is eating okay today although this is her first day with actual food per patient.  She denies melena or hematochezia.  She denies dyspepsia, heartburn, dysphagia, nausea, vomiting, fever.  She has no significant GI history including IBS.  She has no significant family history including any GI pathology.     C. difficile toxin was negative.  Apparently the hospice was not able to order GI PCR.  White blood cell count was initially elevated at arrival and started to decrease but it worsened today.    CT scan abdomen pelvis on 12/2/2021 showed multiple thickened small bowel loops within the mid and left abdomen consistent with enteritis.  No evidence of bowel obstruction.  Mesenteric edema and very minimal ascites.  Diffuse anasarca.  Cholelithiasis.  Minimal left hydronephrosis without obstruction.  Bladder wall thickening.    12/3/2021 labs show sodium 132, creatinine 3.54, BUN 40, calcium 7.2, albumin  2.1; CBC with WBC 13.71, hemoglobin 10.3-improving.    11/30/2021 iron profile showed iron 39, saturation 21%, transferrin 124, TIBC 185.    He is not a smoker he does not drink alcohol.      Past Medical History:  Past Medical History:   Diagnosis Date   • Diabetes (HCC)    • Disease of thyroid gland    • GERD (gastroesophageal reflux disease)    • Hypertension    • Rheumatoid arthritis (HCC)      Past Surgical History:  Past Surgical History:   Procedure Laterality Date   • EYE SURGERY     • HYSTERECTOMY        Social History:   Social History     Tobacco Use   • Smoking status: Never Smoker   • Smokeless tobacco: Never Used   Substance Use Topics   • Alcohol use: Not on file      Family History:  History reviewed. No pertinent family history.    Home Meds:  Medications Prior to Admission   Medication Sig Dispense Refill Last Dose   • aspirin 81 MG chewable tablet Chew 81 mg Daily.   11/29/2021 at Unknown time   • escitalopram (LEXAPRO) 20 MG tablet Take 20 mg by mouth Daily.   11/29/2021 at Unknown time   • glucose blood (Accu-Chek Liz Plus) test strip 4 (Four) Times a Day.   11/29/2021 at Unknown time   • glucose blood (Accu-Chek Guide) test strip 1 each by Other route 4 (Four) Times a Day.   11/29/2021 at Unknown time   • insulin lispro (humaLOG) 100 UNIT/ML injection Inject  under the skin into the appropriate area as directed 3 (Three) Times a Day.   11/29/2021 at Unknown time   • levothyroxine (SYNTHROID, LEVOTHROID) 175 MCG tablet Take 175 mcg by mouth Daily.   11/29/2021 at Unknown time   • losartan (COZAAR) 50 MG tablet TAKE ONE TABLET BY MOUTH EVERY DAY AS DIRECTED- DUE FOR CHECK UP   11/29/2021 at Unknown time   • pantoprazole (PROTONIX) 40 MG EC tablet Take 40 mg by mouth Daily.   11/29/2021 at Unknown time   • rOPINIRole (REQUIP) 0.25 MG tablet Take 3 tablets by mouth every night at bedtime.   11/29/2021 at Unknown time   • sertraline (ZOLOFT) 100 MG tablet Take 100 mg by mouth Daily.   11/29/2021  at Unknown time   • temazepam (RESTORIL) 7.5 MG capsule Take 7.5 mg by mouth.   Past Week at Unknown time     Current Meds:   amLODIPine, 2.5 mg, Oral, Q24H  aspirin, 81 mg, Oral, Daily  escitalopram, 20 mg, Oral, Daily  hydrALAZINE, 50 mg, Oral, Q8H  insulin lispro, 0-7 Units, Subcutaneous, TID AC  [START ON 12/4/2021] levothyroxine, 175 mcg, Oral, Q AM  melatonin, 3 mg, Oral, Nightly  metoprolol tartrate, 25 mg, Oral, Q12H  pantoprazole, 40 mg, Oral, Daily  rOPINIRole, 0.75 mg, Oral, Nightly  sertraline, 100 mg, Oral, Daily  sodium chloride, 10 mL, Intravenous, Q12H      Allergies:  No Known Allergies  Review of Systems  General ROS: positive for  - weight loss  negative for - fever  Psychological ROS: negative for - hallucinations or suicidal ideation  ENT ROS: negative for - nasal congestion or nasal discharge  Respiratory ROS: no cough, shortness of breath, or wheezing  Cardiovascular ROS: no chest pain or dyspnea on exertion  Gastrointestinal ROS: positive for - diarrhea  negative for - abdominal pain, blood in stools, heartburn, melena, nausea/vomiting or swallowing difficulty/pain  Genito-Urinary ROS: no dysuria, trouble voiding, or hematuria  Musculoskeletal ROS: positive for - muscular weakness  negative for - joint swelling  Neurological ROS: positive for - confusion and memory loss  negative for - tremors  Dermatological ROS: negative for pruritus and rash, positive for facial edema    Objective     Vital Signs  Temp:  [98 °F (36.7 °C)-98.8 °F (37.1 °C)] 98.6 °F (37 °C)  Heart Rate:  [51-81] 81  Resp:  [18] 18  BP: (113-174)/() 138/104  Physical Exam:   Constitutional:    Alert, cooperative, in no acute distress    Eyes:          conjunctivae and sclerae normal, no icterus    HENT:   Atraumatic, normal hearing, mucosa moist    Neck:   Trachea midline, supple    Lungs:     Clear to auscultation bilaterally, normal respiratory effort     Heart:    Regular rhythm and normal rate, no MGR, no abdominal  bruits    Abdomen:     Soft, nondistended, questionable tenderness with palpation although patient is unclear about where it reattempts at palpation the abdomen; normal bowel sounds , no organomegaly    Extremities:   no edema or erythema of bilateral lower extremities    Skin:   No bruising, rash, or pallor   Neurologic:  No tremors, no asterixis    Psychiatric:  normal mood and affect; A&O x3--although there is reports of intermittent confusion and encephalopathy.     Results Review:   I reviewed the patient's new clinical results.    Results from last 7 days   Lab Units 12/03/21  1018 12/02/21  1147 12/01/21  0636   WBC 10*3/mm3 13.71* 10.56 12.16*   HEMOGLOBIN g/dL 10.3* 9.2* 10.0*   HEMATOCRIT % 30.4* 27.8* 29.9*   PLATELETS 10*3/mm3 235 170 184     Results from last 7 days   Lab Units 12/03/21  1018 12/02/21  1147 12/01/21  0636 11/30/21  0153 11/29/21  2121   SODIUM mmol/L 132* 134* 132*   < > 132*   POTASSIUM mmol/L 4.9 4.8 4.5   < > 5.0   CHLORIDE mmol/L 103 105 101   < > 97*   CO2 mmol/L 19.2* 18.7* 22.6   < > 24.1   BUN mg/dL 40* 42* 41*   < > 40*   CREATININE mg/dL 3.54* 4.22* 3.76*   < > 4.22*   CALCIUM mg/dL 7.2* 7.2* 7.8*   < > 7.9*   BILIRUBIN mg/dL  --   --   --   --  0.4   ALK PHOS U/L  --   --   --   --  88   ALT (SGPT) U/L  --   --   --   --  12   AST (SGOT) U/L  --   --   --   --  23   GLUCOSE mg/dL 105* 74 100*   < > 292*    < > = values in this interval not displayed.         No results found for: LIPASE    Radiology:  MRI Brain With & Without Contrast   Final Result   The study was hampered by patient motion. No evidence of   acute infarction. Areas of T2 FLAIR hyperintensity involving the   periventricular and to a lesser extent deep white matter of the cerebral   hemispheres bilaterally. Small foci of increased signal intensity   involving the white matter of the left cerebellar hemisphere was also   appreciated. There was no evidence of abnormal enhancement. The   appearance is  nonspecific. The areas of T2 FLAIR hyperintensity   supratentorially may represent small vessel ischemic disease. The   cerebellar areas of T2 FLAIR hyperintensity are nonspecific. This may   relate to PRES. Clinical correlation and a follow-up MRI examination the   brain with and without contrast is suggested. There is no evidence of   abnormal enhancement or hemosiderosis.       This report was finalized on 12/3/2021 11:00 AM by Dr. Osmar Nelson M.D.          CT Abdomen Pelvis Without Contrast   Final Result   1. Minimal left hydronephrosis without obvious obstruction. Bladder wall   is diffusely thickened, which may be the cause of the minimal   hydronephrosis   2. Multiple thickened small bowel loops within the mid and left abdomen   suggesting enteritis. No evidence for bowel obstruction   3. Edema within the mesentery and very minimal ascites. Diffuse   anasarca.   4. Cholelithiasis       Radiation dose reduction techniques were utilized, including automated   exposure control and exposure modulation based on body size.       This report was finalized on 12/2/2021 8:54 AM by Dr. Bijan Ordoñez M.D.          US Renal Bilateral   Final Result      XR Chest 1 View   Final Result   No acute findings.       This report was finalized on 11/29/2021 9:55 PM by Dr. Cherri Rodriguez M.D.              Assessment/Plan   Patient Active Problem List   Diagnosis   • MAYUR (acute kidney injury) (HCC)   • HTN (hypertension)   • Hypothyroidism   • Type 2 diabetes mellitus with hyperglycemia, with long-term current use of insulin (HCC)   • Rheumatoid arthritis (HCC)   • Angioedema   • GERD (gastroesophageal reflux disease)   • Anemia   • Medically noncompliant   • Myocardial infarction due to demand ischemia (HCC)   • Enteritis       Assessment:  1. Diarrhea, new onset less than 24 hours  2. Abdominal pain--questionable as other providers report that she complains of abdominal pain however she denies it today other than  when I palpate, nonspecific  3. Possible enteritis on CT scan versus small bowel thickening due to anasarca  4. Acute kidney injury  5. Angioedema  6. MI    Plan:  · C. difficile negative.  Unable to order GI PCR due to system defaults.  · Given she has less than 24 hours of diarrhea and the small bowel wall thickening on the CT scan may be related to her anasarca-recommend observation for now.  · We will follow up with her tomorrow to see how she is doing and consider further recommendations after that.  · Follow electrolytes and recommend IV fluid hydration as necessary      I discussed the patients findings and my recommendations with patient.    Dragon dictation used throughout this note.            Venessa Canas PA-C  [facility], [place], operated by [facility]  [address] [place] [address]  Office: [phone]

## 2021-12-04 LAB
ALBUMIN SERPL-MCNC: 2 G/DL (ref 3.5–5.2)
ANION GAP SERPL CALCULATED.3IONS-SCNC: 11.3 MMOL/L (ref 5–15)
BACTERIA UR QL AUTO: ABNORMAL /HPF
BASOPHILS # BLD AUTO: 0.05 10*3/MM3 (ref 0–0.2)
BASOPHILS NFR BLD AUTO: 0.4 % (ref 0–1.5)
BILIRUB UR QL STRIP: NEGATIVE
BUN SERPL-MCNC: 41 MG/DL (ref 6–20)
BUN/CREAT SERPL: 9.7 (ref 7–25)
CALCIUM SPEC-SCNC: 6.8 MG/DL (ref 8.6–10.5)
CHLORIDE SERPL-SCNC: 103 MMOL/L (ref 98–107)
CLARITY UR: ABNORMAL
CO2 SERPL-SCNC: 19.7 MMOL/L (ref 22–29)
COLOR UR: YELLOW
CREAT SERPL-MCNC: 4.21 MG/DL (ref 0.57–1)
CREAT UR-MCNC: 85.4 MG/DL
DEPRECATED RDW RBC AUTO: 48.3 FL (ref 37–54)
EOSINOPHIL # BLD AUTO: 0.05 10*3/MM3 (ref 0–0.4)
EOSINOPHIL NFR BLD AUTO: 0.4 % (ref 0.3–6.2)
ERYTHROCYTE [DISTWIDTH] IN BLOOD BY AUTOMATED COUNT: 15.9 % (ref 12.3–15.4)
GFR SERPL CREATININE-BSD FRML MDRD: 11 ML/MIN/1.73
GFR SERPL CREATININE-BSD FRML MDRD: ABNORMAL ML/MIN/{1.73_M2}
GLUCOSE BLDC GLUCOMTR-MCNC: 104 MG/DL (ref 70–130)
GLUCOSE BLDC GLUCOMTR-MCNC: 111 MG/DL (ref 70–130)
GLUCOSE BLDC GLUCOMTR-MCNC: 160 MG/DL (ref 70–130)
GLUCOSE BLDC GLUCOMTR-MCNC: 164 MG/DL (ref 70–130)
GLUCOSE SERPL-MCNC: 109 MG/DL (ref 65–99)
GLUCOSE UR STRIP-MCNC: ABNORMAL MG/DL
HCT VFR BLD AUTO: 25.4 % (ref 34–46.6)
HGB BLD-MCNC: 8.6 G/DL (ref 12–15.9)
HGB UR QL STRIP.AUTO: ABNORMAL
HYALINE CASTS UR QL AUTO: ABNORMAL /LPF
IMM GRANULOCYTES # BLD AUTO: 0.08 10*3/MM3 (ref 0–0.05)
IMM GRANULOCYTES NFR BLD AUTO: 0.6 % (ref 0–0.5)
KETONES UR QL STRIP: NEGATIVE
LEUKOCYTE ESTERASE UR QL STRIP.AUTO: ABNORMAL
LYMPHOCYTES # BLD AUTO: 2.71 10*3/MM3 (ref 0.7–3.1)
LYMPHOCYTES NFR BLD AUTO: 21.1 % (ref 19.6–45.3)
MAGNESIUM SERPL-MCNC: 1.4 MG/DL (ref 1.6–2.6)
MCH RBC QN AUTO: 28.5 PG (ref 26.6–33)
MCHC RBC AUTO-ENTMCNC: 33.9 G/DL (ref 31.5–35.7)
MCV RBC AUTO: 84.1 FL (ref 79–97)
MONOCYTES # BLD AUTO: 0.93 10*3/MM3 (ref 0.1–0.9)
MONOCYTES NFR BLD AUTO: 7.2 % (ref 5–12)
NEUTROPHILS NFR BLD AUTO: 70.3 % (ref 42.7–76)
NEUTROPHILS NFR BLD AUTO: 9.04 10*3/MM3 (ref 1.7–7)
NITRITE UR QL STRIP: NEGATIVE
NRBC BLD AUTO-RTO: 0 /100 WBC (ref 0–0.2)
PH UR STRIP.AUTO: <=5 [PH] (ref 5–8)
PHOSPHATE SERPL-MCNC: 5.4 MG/DL (ref 2.5–4.5)
PLATELET # BLD AUTO: 201 10*3/MM3 (ref 140–450)
PMV BLD AUTO: 10.9 FL (ref 6–12)
POTASSIUM SERPL-SCNC: 4.8 MMOL/L (ref 3.5–5.2)
PROT ?TM UR-MCNC: 591 MG/DL
PROT UR QL STRIP: ABNORMAL
PROT/CREAT UR: 6920.4 MG/G CREA (ref 0–200)
RBC # BLD AUTO: 3.02 10*6/MM3 (ref 3.77–5.28)
RBC # UR STRIP: ABNORMAL /HPF
REF LAB TEST METHOD: ABNORMAL
SODIUM SERPL-SCNC: 134 MMOL/L (ref 136–145)
SODIUM UR-SCNC: 63 MMOL/L
SP GR UR STRIP: 1.02 (ref 1–1.03)
SQUAMOUS #/AREA URNS HPF: ABNORMAL /HPF
UROBILINOGEN UR QL STRIP: ABNORMAL
WBC # UR STRIP: ABNORMAL /HPF
WBC NRBC COR # BLD: 12.86 10*3/MM3 (ref 3.4–10.8)

## 2021-12-04 PROCEDURE — 83735 ASSAY OF MAGNESIUM: CPT | Performed by: INTERNAL MEDICINE

## 2021-12-04 PROCEDURE — 82962 GLUCOSE BLOOD TEST: CPT

## 2021-12-04 PROCEDURE — 84156 ASSAY OF PROTEIN URINE: CPT | Performed by: INTERNAL MEDICINE

## 2021-12-04 PROCEDURE — 85025 COMPLETE CBC W/AUTO DIFF WBC: CPT | Performed by: INTERNAL MEDICINE

## 2021-12-04 PROCEDURE — 99231 SBSQ HOSP IP/OBS SF/LOW 25: CPT | Performed by: PSYCHIATRY & NEUROLOGY

## 2021-12-04 PROCEDURE — 80069 RENAL FUNCTION PANEL: CPT | Performed by: INTERNAL MEDICINE

## 2021-12-04 PROCEDURE — 25010000002 CEFTRIAXONE PER 250 MG: Performed by: HOSPITALIST

## 2021-12-04 PROCEDURE — 87186 SC STD MICRODIL/AGAR DIL: CPT | Performed by: INTERNAL MEDICINE

## 2021-12-04 PROCEDURE — 63710000001 INSULIN LISPRO (HUMAN) PER 5 UNITS: Performed by: HOSPITALIST

## 2021-12-04 PROCEDURE — 82570 ASSAY OF URINE CREATININE: CPT | Performed by: INTERNAL MEDICINE

## 2021-12-04 PROCEDURE — 87086 URINE CULTURE/COLONY COUNT: CPT | Performed by: INTERNAL MEDICINE

## 2021-12-04 PROCEDURE — 84300 ASSAY OF URINE SODIUM: CPT | Performed by: INTERNAL MEDICINE

## 2021-12-04 PROCEDURE — 87077 CULTURE AEROBIC IDENTIFY: CPT | Performed by: INTERNAL MEDICINE

## 2021-12-04 PROCEDURE — 81001 URINALYSIS AUTO W/SCOPE: CPT | Performed by: INTERNAL MEDICINE

## 2021-12-04 PROCEDURE — 99231 SBSQ HOSP IP/OBS SF/LOW 25: CPT | Performed by: INTERNAL MEDICINE

## 2021-12-04 RX ORDER — HYDRALAZINE HYDROCHLORIDE 25 MG/1
25 TABLET, FILM COATED ORAL EVERY 8 HOURS SCHEDULED
Status: DISCONTINUED | OUTPATIENT
Start: 2021-12-04 | End: 2021-12-13

## 2021-12-04 RX ORDER — SODIUM BICARBONATE 650 MG/1
650 TABLET ORAL 3 TIMES DAILY
Status: DISCONTINUED | OUTPATIENT
Start: 2021-12-04 | End: 2021-12-05

## 2021-12-04 RX ADMIN — SODIUM CHLORIDE 75 ML/HR: 9 INJECTION, SOLUTION INTRAVENOUS at 10:00

## 2021-12-04 RX ADMIN — ROPINIROLE HYDROCHLORIDE 0.75 MG: 0.5 TABLET, FILM COATED ORAL at 21:29

## 2021-12-04 RX ADMIN — Medication 3 MG: at 21:30

## 2021-12-04 RX ADMIN — SODIUM BICARBONATE 650 MG: 650 TABLET ORAL at 16:07

## 2021-12-04 RX ADMIN — SODIUM CHLORIDE, PRESERVATIVE FREE 10 ML: 5 INJECTION INTRAVENOUS at 21:31

## 2021-12-04 RX ADMIN — HYDRALAZINE HYDROCHLORIDE 25 MG: 50 TABLET, FILM COATED ORAL at 15:01

## 2021-12-04 RX ADMIN — ASPIRIN 81 MG: 81 TABLET, CHEWABLE ORAL at 09:51

## 2021-12-04 RX ADMIN — CEFTRIAXONE 1 G: 1 INJECTION, POWDER, FOR SOLUTION INTRAMUSCULAR; INTRAVENOUS at 21:30

## 2021-12-04 RX ADMIN — HYDRALAZINE HYDROCHLORIDE 25 MG: 50 TABLET, FILM COATED ORAL at 21:30

## 2021-12-04 RX ADMIN — SODIUM CHLORIDE, PRESERVATIVE FREE 10 ML: 5 INJECTION INTRAVENOUS at 09:54

## 2021-12-04 RX ADMIN — HYDRALAZINE HYDROCHLORIDE 50 MG: 50 TABLET, FILM COATED ORAL at 06:01

## 2021-12-04 RX ADMIN — LEVOTHYROXINE SODIUM 175 MCG: 0.17 TABLET ORAL at 06:01

## 2021-12-04 RX ADMIN — AMLODIPINE BESYLATE 5 MG: 5 TABLET ORAL at 09:51

## 2021-12-04 RX ADMIN — METOPROLOL TARTRATE 25 MG: 25 TABLET, FILM COATED ORAL at 21:31

## 2021-12-04 RX ADMIN — ESCITALOPRAM 20 MG: 20 TABLET, FILM COATED ORAL at 09:51

## 2021-12-04 RX ADMIN — SERTRALINE 100 MG: 100 TABLET, FILM COATED ORAL at 09:51

## 2021-12-04 RX ADMIN — SODIUM BICARBONATE 650 MG: 650 TABLET ORAL at 21:30

## 2021-12-04 RX ADMIN — INSULIN LISPRO 2 UNITS: 100 INJECTION, SOLUTION INTRAVENOUS; SUBCUTANEOUS at 17:32

## 2021-12-04 RX ADMIN — PANTOPRAZOLE SODIUM 40 MG: 40 TABLET, DELAYED RELEASE ORAL at 09:51

## 2021-12-04 NOTE — PROGRESS NOTES
"   LOS: 5 days    Patient Care Team:  Provider, No Known as PCP - General    Chief Complaint:    Chief Complaint   Patient presents with   • Altered Mental Status     Follow UP MAYUR CKD3  Subjective     Interval History:   I/O 240/525   Nausea better; no dyspnea     Objective     Vital Signs  Temp:  [98.1 °F (36.7 °C)-98.9 °F (37.2 °C)] 98.1 °F (36.7 °C)  Heart Rate:  [53-56] 56  Resp:  [16-18] 16  BP: (110-136)/(66-82) 134/73    Flowsheet Rows      First Filed Value   Admission Height 157.5 cm (62\") Documented at 11/29/2021 2011   Admission Weight 59 kg (130 lb) Documented at 11/30/2021 0200          No intake/output data recorded.  I/O last 3 completed shifts:  In: 1240 [P.O.:240; I.V.:1000]  Out: 975 [Urine:975]    Intake/Output Summary (Last 24 hours) at 12/4/2021 1332  Last data filed at 12/4/2021 1100  Gross per 24 hour   Intake 0 ml   Output 525 ml   Net -525 ml       Physical Exam:  General Appearance: drowsy but arousable, no acute distress  Neck supple no JVD  Lungs CTA bilat no rales  CV RRR no m/g  abd soft NT/ND  vasc no pedal/ankle edema 2+ radial pulses      Results Review:    Results from last 7 days   Lab Units 12/04/21  0604 12/03/21  1018 12/02/21  1147 11/30/21  0153 11/29/21  2121   SODIUM mmol/L 134* 132* 134*   < > 132*   POTASSIUM mmol/L 4.8 4.9 4.8   < > 5.0   CHLORIDE mmol/L 103 103 105   < > 97*   CO2 mmol/L 19.7* 19.2* 18.7*   < > 24.1   BUN mg/dL 41* 40* 42*   < > 40*   CREATININE mg/dL 4.21* 3.54* 4.22*   < > 4.22*   CALCIUM mg/dL 6.8* 7.2* 7.2*   < > 7.9*   BILIRUBIN mg/dL  --   --   --   --  0.4   ALK PHOS U/L  --   --   --   --  88   ALT (SGPT) U/L  --   --   --   --  12   AST (SGOT) U/L  --   --   --   --  23   GLUCOSE mg/dL 109* 105* 74   < > 292*    < > = values in this interval not displayed.       Estimated Creatinine Clearance: 12.8 mL/min (A) (by C-G formula based on SCr of 4.21 mg/dL (H)).    Results from last 7 days   Lab Units 12/04/21  0604 12/03/21  1018 12/02/21  1147 "   MAGNESIUM mg/dL 1.4*  --  1.5*   PHOSPHORUS mg/dL 5.4* 5.3* 5.6*             Results from last 7 days   Lab Units 12/04/21  0604 12/03/21  1018 12/02/21  1147 12/01/21  0636 11/30/21  1525 11/30/21  0552 11/30/21  0552   WBC 10*3/mm3 12.86* 13.71* 10.56 12.16*  --   --  8.49   HEMOGLOBIN g/dL 8.6* 10.3* 9.2* 10.0* 8.2*   < > 6.9*   PLATELETS 10*3/mm3 201 235 170 184  --   --  130*    < > = values in this interval not displayed.               Imaging Results (Last 24 Hours)     ** No results found for the last 24 hours. **        amLODIPine, 5 mg, Oral, Q24H  aspirin, 81 mg, Oral, Daily  escitalopram, 20 mg, Oral, Daily  hydrALAZINE, 50 mg, Oral, Q8H  insulin lispro, 0-7 Units, Subcutaneous, TID AC  levothyroxine, 175 mcg, Oral, Q AM  melatonin, 3 mg, Oral, Nightly  metoprolol tartrate, 25 mg, Oral, Q12H  pantoprazole, 40 mg, Oral, Daily  rOPINIRole, 0.75 mg, Oral, Nightly  sertraline, 100 mg, Oral, Daily  sodium chloride, 10 mL, Intravenous, Q12H      sodium chloride, 75 mL/hr, Last Rate: 75 mL/hr (12/04/21 1000)        Medication Review:   Current Facility-Administered Medications   Medication Dose Route Frequency Provider Last Rate Last Admin   • acetaminophen (TYLENOL) tablet 650 mg  650 mg Oral Q4H PRN Farida Laboy APRN       • amLODIPine (NORVASC) tablet 5 mg  5 mg Oral Q24H Alejo Lange MD   5 mg at 12/04/21 0951   • aspirin chewable tablet 81 mg  81 mg Oral Daily Farida Laboy APRN   81 mg at 12/04/21 0951   • dextrose (D50W) (25 g/50 mL) IV injection 25 g  25 g Intravenous Q15 Min PRN Farida Laboy APRN       • dextrose (GLUTOSE) oral gel 15 g  15 g Oral Q15 Min PRN Farida Laboy APRN       • escitalopram (LEXAPRO) tablet 20 mg  20 mg Oral Daily Farida Laboy APRN   20 mg at 12/04/21 0951   • glucagon (human recombinant) (GLUCAGEN DIAGNOSTIC) injection 1 mg  1 mg Subcutaneous PRN Fraida Laboy APRN       • hydrALAZINE (APRESOLINE) tablet 50 mg  50  mg Oral Q8H Alejo Lange MD   50 mg at 12/04/21 0601   • insulin lispro (ADMELOG) injection 0-7 Units  0-7 Units Subcutaneous TID AC Fran Yo MD       • labetalol (NORMODYNE,TRANDATE) injection 10 mg  10 mg Intravenous Q6H PRN Farida Laboy APRN   10 mg at 12/01/21 1340   • levothyroxine (SYNTHROID, LEVOTHROID) tablet 175 mcg  175 mcg Oral Q AM rFan Yo MD   175 mcg at 12/04/21 0601   • loperamide (IMODIUM) capsule 2 mg  2 mg Oral 4x Daily PRN Fran Yo MD   2 mg at 12/03/21 1519   • melatonin tablet 3 mg  3 mg Oral Nightly Farida Laboy APRN   3 mg at 12/03/21 2045   • metoprolol tartrate (LOPRESSOR) tablet 25 mg  25 mg Oral Q12H Ania Agrawal MD   25 mg at 12/03/21 2044   • nitroglycerin (NITROSTAT) SL tablet 0.4 mg  0.4 mg Sublingual Q5 Min PRN Farida Laboy APRN       • pantoprazole (PROTONIX) EC tablet 40 mg  40 mg Oral Daily Farida Laboy APRN   40 mg at 12/04/21 0951   • prochlorperazine (COMPAZINE) injection 5 mg  5 mg Intravenous Q6H PRN Farida Laboy APRN       • rOPINIRole (REQUIP) tablet 0.75 mg  0.75 mg Oral Nightly Farida Laboy APRN   0.75 mg at 12/03/21 2045   • sertraline (ZOLOFT) tablet 100 mg  100 mg Oral Daily Farida Laboy APRN   100 mg at 12/04/21 0951   • sodium chloride 0.9 % flush 10 mL  10 mL Intravenous Q12H Farida Laboy APRN   10 mL at 12/04/21 0954   • sodium chloride 0.9 % flush 10 mL  10 mL Intravenous PRN Farida Laboy APRN       • sodium chloride 0.9 % infusion  75 mL/hr Intravenous Continuous Alejo Lange MD 75 mL/hr at 12/04/21 1000 75 mL/hr at 12/04/21 1000       Assessment/Plan   1.  MAYUR, CKD stage 4 - Cr up 4.2, has varied 3.5 to 4.2 over stay, was notably 2.1 on 8/7/21 (vs 1.0 in 2019); ? ATN due to N/V, poor oral intake with ongoing weight loss, +/- ABLA, vasospasm from HTN urgency, and compromised renal autoregulation due to ARB; only minimal left  hydronephrosis on CT so doubt significant enough to be playing any role.  Degree of worsening greater than would expect from progressive diabetic nephropathy.  Need UA & Pr/Cr ratio (serum albumin very low and suspect nephrotic).  Slow mentation may be related in part to uremia   2.  Facial swelling and confusion, both improving.  Probably due to myxedema from severe hypothyroidism.  In light of extremely elevated TSH, ARB as culprit for facial swelling is much less likely  3.  DM2 with poor control  4.  Anemia, hgb down 8.6 also with ongoing IVF   5.  Hypertension, watch for over-correction  6.  Type II non-NSTEMI  7.  Severe hypothyroidism.  This is probably driving her mild hyponatremia as well  8.  Medication noncompliance  9.  Diarrhea, resolved   10.NAGMA - HCo3 19    Plan  - stop IVF in AM  - may end up requiring HD this admission  - check UA, Pr/Cr ratio, basic GN serologies   - add nahco3 tabs   - halve hydralazine dose      MAYUR (acute kidney injury) (HCC)    HTN (hypertension)    Hypothyroidism    Type 2 diabetes mellitus with hyperglycemia, with long-term current use of insulin (HCC)    Rheumatoid arthritis (HCC)    Angioedema    Anemia    Medically noncompliant    Myocardial infarction due to demand ischemia (HCC)    Enteritis              Frederic Powell MD  12/04/21  13:32 EST

## 2021-12-04 NOTE — PLAN OF CARE
Goal Outcome Evaluation:  Plan of Care Reviewed With: patient        Progress: no change  Outcome Summary: VSS. A&Ox2 - disoriented to situation. Frequent redirection provided. NS infusing. Slept intermittently tonight. Will continue to monitor.

## 2021-12-04 NOTE — PROGRESS NOTES
Name: Zaina Martinez ADMIT: 2021   : 1965  PCP: Provider, No Known    MRN: [id number] LOS: 5 days   AGE/SEX: 56 y.o. female  ROOM: E461/     Subjective   Subjective    Patient looks much better today during my assessment. Much as she did 3 days ago when I first saw her. She is alert and oriented x3. She realizes she has been somewhat out of it the last couple of days. After I evaluated patient got called by nursing staff as patient fell. She did not hit her head or sustain any injury. She is without complaint regarding her fall.     Objective   Objective   Vital Signs  Temp:  [98.1 °F (36.7 °C)-98.9 °F (37.2 °C)] 98.1 °F (36.7 °C)  Heart Rate:  [53-56] 56  Resp:  [16-18] 16  BP: (110-136)/(66-82) 134/73  SpO2:  [98 %-99 %] 99 %  on   ;   Device (Oxygen Therapy): room air  Body mass index is 24.69 kg/m².  Physical Exam  Constitutional:       General: She is not in acute distress.     Appearance: She is ill-appearing (Chronically).   HENT:      Head: Normocephalic.      Nose: Nose normal.      Mouth/Throat:      Mouth: Mucous membranes are moist.   Eyes:      Extraocular Movements: Extraocular movements intact.      Pupils: Pupils are equal, round, and reactive to light.   Cardiovascular:      Rate and Rhythm: Regular rhythm. Bradycardia present.      Pulses: Normal pulses.      Heart sounds: Normal heart sounds.   Pulmonary:      Effort: Pulmonary effort is normal. No respiratory distress.      Breath sounds: Normal breath sounds.   Abdominal:      General: Bowel sounds are normal. There is no distension.      Palpations: Abdomen is soft.   Musculoskeletal:      Cervical back: Normal range of motion and neck supple. No rigidity.   Skin:     General: Skin is warm and dry.      Coloration: Skin is not jaundiced.   Neurological:      General: No focal deficit present.      Mental Status: She is disoriented.   Psychiatric:         Cognition and Memory: Cognition is impaired. Memory is impaired.          Judgment: Judgment is inappropriate.         Results Review     I reviewed the patient's new clinical results.  Results from last 7 days   Lab Units 12/04/21  0604 12/03/21  1018 12/02/21  1147 12/01/21  0636   WBC 10*3/mm3 12.86* 13.71* 10.56 12.16*   HEMOGLOBIN g/dL 8.6* 10.3* 9.2* 10.0*   PLATELETS 10*3/mm3 201 235 170 184     Results from last 7 days   Lab Units 12/04/21  0604 12/03/21  1018 12/02/21  1147 12/01/21  0636   SODIUM mmol/L 134* 132* 134* 132*   POTASSIUM mmol/L 4.8 4.9 4.8 4.5   CHLORIDE mmol/L 103 103 105 101   CO2 mmol/L 19.7* 19.2* 18.7* 22.6   BUN mg/dL 41* 40* 42* 41*   CREATININE mg/dL 4.21* 3.54* 4.22* 3.76*   GLUCOSE mg/dL 109* 105* 74 100*   EGFR IF NONAFRICN AM mL/min/1.73 11* 13* 11* 12*     Results from last 7 days   Lab Units 12/04/21  0604 12/03/21  1018 12/02/21  1147 11/29/21  2121   ALBUMIN g/dL 2.00* 2.10* 2.10* 2.70*   BILIRUBIN mg/dL  --   --   --  0.4   ALK PHOS U/L  --   --   --  88   AST (SGOT) U/L  --   --   --  23   ALT (SGPT) U/L  --   --   --  12     Results from last 7 days   Lab Units 12/04/21  0604 12/03/21  1018 12/02/21  1147 12/01/21  0636 11/30/21  0153 11/29/21  2121   CALCIUM mg/dL 6.8* 7.2* 7.2* 7.8*   < > 7.9*   ALBUMIN g/dL 2.00* 2.10* 2.10*  --   --  2.70*   MAGNESIUM mg/dL 1.4*  --  1.5*  --   --   --    PHOSPHORUS mg/dL 5.4* 5.3* 5.6*  --   --   --     < > = values in this interval not displayed.        Results from last 7 days   Lab Units 11/30/21 2037 11/30/21  0552   IRON mcg/dL  --  39   IRON SATURATION %  --  21   TRANSFERRIN mg/dL  --  124*   TIBC mcg/dL  --  185*   OCCULT BLOOD, FECAL  Negative  --    TSH uIU/mL  --  152.000*     COVID19   Date Value Ref Range Status   11/29/2021 Not Detected Not Detected - Ref. Range Final     Glucose   Date/Time Value Ref Range Status   12/04/2021 1136 104 70 - 130 mg/dL Final     Comment:     Meter: [id number] : [id number] Candy SAAVEDRA   12/04/2021 0611 111 70 - 130 mg/dL Final     Comment:     Meter:  [id number] : [id number] Frank Whitley NA   12/03/2021 2055 180 (H) 70 - 130 mg/dL Final     Comment:     Meter: [id number] : [id number] Jose Rossi NA   12/03/2021 1653 195 (H) 70 - 130 mg/dL Final     Comment:     Meter: [id number] : [id number] Polina Leong NA   12/03/2021 1125 120 70 - 130 mg/dL Final     Comment:     Meter: [id number] : [id number] [place] Fuentes NA   12/03/2021 0714 85 70 - 130 mg/dL Final     Comment:     Meter: [id number] : [id number] Scout Fany MARLEEN RN   12/03/2021 0633 57 (L) 70 - 130 mg/dL Final     Comment:     Meter: [id number] : [id number] Jose Rossi NA       Scheduled Medications  amLODIPine, 5 mg, Oral, Q24H  aspirin, 81 mg, Oral, Daily  escitalopram, 20 mg, Oral, Daily  hydrALAZINE, 25 mg, Oral, Q8H  insulin lispro, 0-7 Units, Subcutaneous, TID AC  levothyroxine, 175 mcg, Oral, Q AM  melatonin, 3 mg, Oral, Nightly  metoprolol tartrate, 25 mg, Oral, Q12H  pantoprazole, 40 mg, Oral, Daily  rOPINIRole, 0.75 mg, Oral, Nightly  sertraline, 100 mg, Oral, Daily  sodium bicarbonate, 650 mg, Oral, TID  sodium chloride, 10 mL, Intravenous, Q12H    Infusions  sodium chloride, 75 mL/hr, Last Rate: 75 mL/hr (12/04/21 1000)    Diet  Diet Regular; Consistent Carbohydrate       Assessment/Plan     Active Hospital Problems    Diagnosis  POA   • **MAYUR (acute kidney injury) (HCC) [N17.9]  Yes   • Enteritis [K52.9]  Clinically Undetermined   • Medically noncompliant [Z91.19]  Not Applicable   • Myocardial infarction due to demand ischemia (HCC) [I21.A1]  Yes   • Angioedema [T78.3XXA]  Yes   • Anemia [D64.9]  Yes   • HTN (hypertension) [I10]  Yes   • Hypothyroidism [E03.9]  Yes   • Type 2 diabetes mellitus with hyperglycemia, with long-term current use of insulin (HCC) [E11.65, Z79.4]  Not Applicable   • Rheumatoid arthritis (HCC) [M06.9]  Yes      Resolved Hospital Problems    Diagnosis Date Resolved POA   • Hypertensive urgency [I16.0] 11/30/2021 Yes       56 y.o. female with MAYUR  (acute kidney injury) (HCC).    Mental status seems better today. Neurology suspects PRES though it does not seem her alterations in mental status correlate at all with her blood pressure readings. For the most part her blood pressure has been adequately controlled. Will continue current medications and close monitoring. Note nephrology is making some adjustments. She has worsening renal function and there is consideration for dialysis this admission. Fortunately her GI symptoms seem improved today. She denies any abdominal pain or diarrhea. Appreciate GI evaluation. She is on Imodium as needed.    Continue oral thyroid replacement. Would repeat TSH and free T4 on Monday. This is likely due to patient's poor medical compliance.    Blood sugars seem better with SSI only. Monitor closely now that she is more alert and eating regular food.      · SCDs for DVT prophylaxis.  · Full code.  · Discussed with patient and nursing staff.  · Anticipate discharge home with HH vs SNU facility timing yet to be determined.      Fran Yo MD  [facility]  12/04/21  15:25 EST         Width Of Defect Perpendicular To Closure In Cm (Required): 1.5

## 2021-12-04 NOTE — PLAN OF CARE
Goal Outcome Evaluation:  Plan of Care Reviewed With: patient        Progress: no change  Outcome Summary: Overview of Shift: Vs stable, pt alert and oriented, pt however fell this am, found down by side of bed, reported that her leg gave out, pt assisted back to bed , checked for injuries, none found, the proper parties notified, neuro signed off, urine sample sent, pt complaining of urinary burning and frequency, Dr. Yo notified, ordered pt to start on IV rocephin today, will continue to monitor

## 2021-12-04 NOTE — NURSING NOTE
Pt found down on her side at foot of bed around 0924 by nursing assistants. Pt assisted back to bed and assessed for injuries. Pt not completely clear on reason for getting out of bed. Pt able to answer all orientation questions and reports no pain, also stated she did not hit her head. No injuries detected. Monitor tech and other RN who had previously been in room report bed alarm being on, but alarm did not sound at time of fall.     Pt's doctor and spouse notified of fall.  also apprised of situation. Will continue to monitor. Bed alarm on.

## 2021-12-04 NOTE — PROGRESS NOTES
[facility]  Inpatient Progress Note    Reason for Follow Up: Abdominal pain, diarrhea, enteritis    Subjective     Interval History:   Patient reports diarrhea has resolved, no bowel movement since yesterday.  She denies melena or bright red blood per rectum, drop in H&H noted.  No prior GI evaluation for her account.  Currently denies any abdominal pain.  Discussed findings of enteritis on CT scan, suspect possible viral or bacterial etiology.    Current Facility-Administered Medications:   •  acetaminophen (TYLENOL) tablet 650 mg, 650 mg, Oral, Q4H PRN **OR** [DISCONTINUED] acetaminophen (TYLENOL) 160 MG/5ML solution 650 mg, 650 mg, Oral, Q4H PRN **OR** [DISCONTINUED] acetaminophen (TYLENOL) suppository 650 mg, 650 mg, Rectal, Q4H PRN, Farida Laboy APRN  •  amLODIPine (NORVASC) tablet 5 mg, 5 mg, Oral, Q24H, Alejo Lange MD, 5 mg at 12/04/21 0951  •  aspirin chewable tablet 81 mg, 81 mg, Oral, Daily, Farida Laboy APRN, 81 mg at 12/04/21 0951  •  dextrose (D50W) (25 g/50 mL) IV injection 25 g, 25 g, Intravenous, Q15 Min PRN, Farida Laboy APRN  •  dextrose (GLUTOSE) oral gel 15 g, 15 g, Oral, Q15 Min PRN, Farida Laboy APRN  •  escitalopram (LEXAPRO) tablet 20 mg, 20 mg, Oral, Daily, Farida Laboy APRN, 20 mg at 12/04/21 0951  •  glucagon (human recombinant) (GLUCAGEN DIAGNOSTIC) injection 1 mg, 1 mg, Subcutaneous, PRN, Farida Laboy APRN  •  hydrALAZINE (APRESOLINE) tablet 50 mg, 50 mg, Oral, Q8H, Alejo Lange MD, 50 mg at 12/04/21 0601  •  insulin lispro (ADMELOG) injection 0-7 Units, 0-7 Units, Subcutaneous, TID AC, Fran Yo MD  •  labetalol (NORMODYNE,TRANDATE) injection 10 mg, 10 mg, Intravenous, Q6H PRN, Farida Laboy, APRN, 10 mg at 12/01/21 1340  •  levothyroxine (SYNTHROID, LEVOTHROID) tablet 175 mcg, 175 mcg, Oral, Q AM, Fran Yo MD, 175 mcg at 12/04/21 0601  •  loperamide (IMODIUM) capsule  2 mg, 2 mg, Oral, 4x Daily PRN, Fran Yo MD, 2 mg at 12/03/21 1519  •  melatonin tablet 3 mg, 3 mg, Oral, Nightly, Farida Laboy APRN, 3 mg at 12/03/21 2045  •  metoprolol tartrate (LOPRESSOR) tablet 25 mg, 25 mg, Oral, Q12H, Ania Agrawal MD, 25 mg at 12/03/21 2044  •  nitroglycerin (NITROSTAT) SL tablet 0.4 mg, 0.4 mg, Sublingual, Q5 Min PRN, Farida Laboy APRN  •  pantoprazole (PROTONIX) EC tablet 40 mg, 40 mg, Oral, Daily, Farida Laboy APRN, 40 mg at 12/04/21 0951  •  prochlorperazine (COMPAZINE) injection 5 mg, 5 mg, Intravenous, Q6H PRN, Farida Laboy APRN  •  rOPINIRole (REQUIP) tablet 0.75 mg, 0.75 mg, Oral, Nightly, Farida Laboy APRN, 0.75 mg at 12/03/21 2045  •  sertraline (ZOLOFT) tablet 100 mg, 100 mg, Oral, Daily, Farida Laboy APRN, 100 mg at 12/04/21 0951  •  sodium chloride 0.9 % flush 10 mL, 10 mL, Intravenous, Q12H, Farida Laboy APRN, 10 mL at 12/04/21 0954  •  sodium chloride 0.9 % flush 10 mL, 10 mL, Intravenous, PRN, Farida Laboy APRN  •  sodium chloride 0.9 % infusion, 75 mL/hr, Intravenous, Continuous, Alejo Lange MD, Last Rate: 75 mL/hr at 12/04/21 1000, 75 mL/hr at 12/04/21 1000  Review of Systems:    All systems were reviewed and negative except for:  Gastrointestinal: positive for  See HPI    Objective     Vital Signs  Temp:  [98.1 °F (36.7 °C)-98.9 °F (37.2 °C)] 98.1 °F (36.7 °C)  Heart Rate:  [53-81] 56  Resp:  [16-18] 16  BP: (110-138)/() 134/73  Body mass index is 24.69 kg/m².    Intake/Output Summary (Last 24 hours) at 12/4/2021 1034  Last data filed at 12/4/2021 0700  Gross per 24 hour   Intake 240 ml   Output 525 ml   Net -285 ml     No intake/output data recorded.     Physical Exam:   General: patient awake, alert and cooperative   Eyes: Normal lids and lashes, no scleral icterus   Neck: supple, normal ROM   Skin: warm and dry, not jaundiced   Cardiovascular: regular rhythm and rate,  no murmurs auscultated   Pulm: clear to auscultation bilaterally, regular and unlabored   Abdomen: soft, nontender, nondistended; normal bowel sounds   Extremities: no rash or edema   Psychiatric: Normal mood and behavior; memory intact     Results Review:     I reviewed the patient's new clinical results.    Results from last 7 days   Lab Units 12/04/21  0604 12/03/21  1018 12/02/21  1147   WBC 10*3/mm3 12.86* 13.71* 10.56   HEMOGLOBIN g/dL 8.6* 10.3* 9.2*   HEMATOCRIT % 25.4* 30.4* 27.8*   PLATELETS 10*3/mm3 201 235 170     Results from last 7 days   Lab Units 12/04/21  0604 12/03/21  1018 12/02/21  1147 11/30/21  0153 11/29/21  2121   SODIUM mmol/L 134* 132* 134*   < > 132*   POTASSIUM mmol/L 4.8 4.9 4.8   < > 5.0   CHLORIDE mmol/L 103 103 105   < > 97*   CO2 mmol/L 19.7* 19.2* 18.7*   < > 24.1   BUN mg/dL 41* 40* 42*   < > 40*   CREATININE mg/dL 4.21* 3.54* 4.22*   < > 4.22*   CALCIUM mg/dL 6.8* 7.2* 7.2*   < > 7.9*   BILIRUBIN mg/dL  --   --   --   --  0.4   ALK PHOS U/L  --   --   --   --  88   ALT (SGPT) U/L  --   --   --   --  12   AST (SGOT) U/L  --   --   --   --  23   GLUCOSE mg/dL 109* 105* 74   < > 292*    < > = values in this interval not displayed.         No results found for: LIPASE    Radiology:  MRI Brain With & Without Contrast   Final Result   The study was hampered by patient motion. No evidence of   acute infarction. Areas of T2 FLAIR hyperintensity involving the   periventricular and to a lesser extent deep white matter of the cerebral   hemispheres bilaterally. Small foci of increased signal intensity   involving the white matter of the left cerebellar hemisphere was also   appreciated. There was no evidence of abnormal enhancement. The   appearance is nonspecific. The areas of T2 FLAIR hyperintensity   supratentorially may represent small vessel ischemic disease. The   cerebellar areas of T2 FLAIR hyperintensity are nonspecific. This may   relate to PRES. Clinical correlation and a  follow-up MRI examination the   brain with and without contrast is suggested. There is no evidence of   abnormal enhancement or hemosiderosis.       This report was finalized on 12/3/2021 11:00 AM by Dr. Osmar Nelson M.D.          CT Abdomen Pelvis Without Contrast   Final Result   1. Minimal left hydronephrosis without obvious obstruction. Bladder wall   is diffusely thickened, which may be the cause of the minimal   hydronephrosis   2. Multiple thickened small bowel loops within the mid and left abdomen   suggesting enteritis. No evidence for bowel obstruction   3. Edema within the mesentery and very minimal ascites. Diffuse   anasarca.   4. Cholelithiasis       Radiation dose reduction techniques were utilized, including automated   exposure control and exposure modulation based on body size.       This report was finalized on 12/2/2021 8:54 AM by Dr. Bijan Ordoñez M.D.          US Renal Bilateral   Final Result      XR Chest 1 View   Final Result   No acute findings.       This report was finalized on 11/29/2021 9:55 PM by Dr. Cherri Rodriguez M.D.              Assessment/Plan     Patient Active Problem List   Diagnosis   • MAYUR (acute kidney injury) (HCC)   • HTN (hypertension)   • Hypothyroidism   • Type 2 diabetes mellitus with hyperglycemia, with long-term current use of insulin (HCC)   • Rheumatoid arthritis (HCC)   • Angioedema   • GERD (gastroesophageal reflux disease)   • Anemia   • Medically noncompliant   • Myocardial infarction due to demand ischemia (HCC)   • Enteritis       Assessment:  1. Diarrhea: Acute onset with resolution  2. Abdominal pain: Resolved  3. Abnormal CT scan with enteritis  4. MAYUR  5. Anemia      Plan:  · Can monitor H&H in the outpatient setting  · Further GI work-up pending recurrence of symptoms  I discussed the patients findings and my recommendations with patient.    Frederic Romero MD

## 2021-12-04 NOTE — PROGRESS NOTES
Patient Identification:  NAME:  Zaina Martinez  Age:  56 y.o.   Sex:  female   :  1965   MRN:  [id number]       Chief complaint: PRES (posterior reversible encephalopathic syndrome )    History of present illness: Patient is awake and alert today seems better oriented and has no complaints.  She has had an MRI scan that shows changes consistent with posterior reversible encephalopathic syndrome.    Note the EEG was completely normal  Past medical history:  Past Medical History:   Diagnosis Date   • Diabetes (HCC)    • Disease of thyroid gland    • GERD (gastroesophageal reflux disease)    • Hypertension    • Rheumatoid arthritis (HCC)        Allergies:  Patient has no known allergies.    Home medications:  Medications Prior to Admission   Medication Sig Dispense Refill Last Dose   • aspirin 81 MG chewable tablet Chew 81 mg Daily.   2021 at Unknown time   • escitalopram (LEXAPRO) 20 MG tablet Take 20 mg by mouth Daily.   2021 at Unknown time   • glucose blood (Accu-Chek Liz Plus) test strip 4 (Four) Times a Day.   2021 at Unknown time   • glucose blood (Accu-Chek Guide) test strip 1 each by Other route 4 (Four) Times a Day.   2021 at Unknown time   • insulin lispro (humaLOG) 100 UNIT/ML injection Inject  under the skin into the appropriate area as directed 3 (Three) Times a Day.   2021 at Unknown time   • levothyroxine (SYNTHROID, LEVOTHROID) 175 MCG tablet Take 175 mcg by mouth Daily.   2021 at Unknown time   • losartan (COZAAR) 50 MG tablet TAKE ONE TABLET BY MOUTH EVERY DAY AS DIRECTED- DUE FOR CHECK UP   2021 at Unknown time   • pantoprazole (PROTONIX) 40 MG EC tablet Take 40 mg by mouth Daily.   2021 at Unknown time   • rOPINIRole (REQUIP) 0.25 MG tablet Take 3 tablets by mouth every night at bedtime.   2021 at Unknown time   • sertraline (ZOLOFT) 100 MG tablet Take 100 mg by mouth Daily.   2021 at Unknown time   • temazepam (RESTORIL) 7.5 MG  capsule Take 7.5 mg by mouth.   Past Week at Unknown time        Hospital medications:  amLODIPine, 5 mg, Oral, Q24H  aspirin, 81 mg, Oral, Daily  escitalopram, 20 mg, Oral, Daily  hydrALAZINE, 25 mg, Oral, Q8H  insulin lispro, 0-7 Units, Subcutaneous, TID AC  levothyroxine, 175 mcg, Oral, Q AM  melatonin, 3 mg, Oral, Nightly  metoprolol tartrate, 25 mg, Oral, Q12H  pantoprazole, 40 mg, Oral, Daily  rOPINIRole, 0.75 mg, Oral, Nightly  sertraline, 100 mg, Oral, Daily  sodium bicarbonate, 650 mg, Oral, TID  sodium chloride, 10 mL, Intravenous, Q12H      sodium chloride, 75 mL/hr, Last Rate: 75 mL/hr (12/04/21 1000)      •  acetaminophen **OR** [DISCONTINUED] acetaminophen **OR** [DISCONTINUED] acetaminophen  •  dextrose  •  dextrose  •  glucagon (human recombinant)  •  labetalol  •  loperamide  •  nitroglycerin  •  prochlorperazine  •  sodium chloride      Objective:  Vitals Ranges:   Temp:  [98.1 °F (36.7 °C)-98.9 °F (37.2 °C)] 98.1 °F (36.7 °C)  Heart Rate:  [53-56] 56  Resp:  [16-18] 16  BP: (110-136)/(66-82) 134/73      Physical Exam:  Awake moderately alert and seems to be much better oriented she did say [place] and [place] but then corrected it to [place] normal cranial nerves II through VII tongue is midline good  strength toe wiggle reflexes trace throughout symmetrical toes downgoing bilaterally    Results review:   I reviewed the patient's new clinical results.    Data review:  Lab Results (last 24 hours)     Procedure Component Value Units Date/Time    POC Glucose Once [[id number]]  (Normal) Collected: 12/04/21 1136    Specimen: Blood Updated: 12/04/21 1141     Glucose 104 mg/dL      Comment: Meter: [id number] : [id number] Candy Almeidaia QUENTIN       Renal Function Panel [[id number]]  (Abnormal) Collected: 12/04/21 0604    Specimen: Blood Updated: 12/04/21 0814     Glucose 109 mg/dL      BUN 41 mg/dL      Creatinine 4.21 mg/dL      Sodium 134 mmol/L      Potassium 4.8 mmol/L      Chloride 103  mmol/L      CO2 19.7 mmol/L      Calcium 6.8 mg/dL      Albumin 2.00 g/dL      Phosphorus 5.4 mg/dL      Anion Gap 11.3 mmol/L      BUN/Creatinine Ratio 9.7     eGFR Non African Amer 11 mL/min/1.73      Comment: <15 Indicative of kidney failure.        eGFR   Amer --     Comment: <15 Indicative of kidney failure.       Narrative:      GFR Normal >60  Chronic Kidney Disease <60  Kidney Failure <15      Magnesium [[id number]]  (Abnormal) Collected: 12/04/21 0604    Specimen: Blood Updated: 12/04/21 0814     Magnesium 1.4 mg/dL     CBC & Differential [[id number]]  (Abnormal) Collected: 12/04/21 0604    Specimen: Blood Updated: 12/04/21 0740    Narrative:      The following orders were created for panel order CBC & Differential.  Procedure                               Abnormality         Status                     ---------                               -----------         ------                     CBC Auto Differential[[id number]]        Abnormal            Final result                 Please view results for these tests on the individual orders.    CBC Auto Differential [[id number]]  (Abnormal) Collected: 12/04/21 0604    Specimen: Blood Updated: 12/04/21 0740     WBC 12.86 10*3/mm3      RBC 3.02 10*6/mm3      Hemoglobin 8.6 g/dL      Hematocrit 25.4 %      MCV 84.1 fL      MCH 28.5 pg      MCHC 33.9 g/dL      RDW 15.9 %      RDW-SD 48.3 fl      MPV 10.9 fL      Platelets 201 10*3/mm3      Neutrophil % 70.3 %      Lymphocyte % 21.1 %      Monocyte % 7.2 %      Eosinophil % 0.4 %      Basophil % 0.4 %      Immature Grans % 0.6 %      Neutrophils, Absolute 9.04 10*3/mm3      Lymphocytes, Absolute 2.71 10*3/mm3      Monocytes, Absolute 0.93 10*3/mm3      Eosinophils, Absolute 0.05 10*3/mm3      Basophils, Absolute 0.05 10*3/mm3      Immature Grans, Absolute 0.08 10*3/mm3      nRBC 0.0 /100 WBC     POC Glucose Once [[id number]]  (Normal) Collected: 12/04/21 0611    Specimen: Blood Updated: 12/04/21 0617     Glucose  111 mg/dL      Comment: Meter: [id number] : [id number] Frank Whitley NA       POC Glucose Once [[id number]]  (Abnormal) Collected: 12/03/21 2055    Specimen: Blood Updated: 12/03/21 2056     Glucose 180 mg/dL      Comment: Meter: [id number] : [id number] Jose Rossi NA       POC Glucose Once [[id number]]  (Abnormal) Collected: 12/03/21 1653    Specimen: Blood Updated: 12/03/21 1654     Glucose 195 mg/dL      Comment: Meter: [id number] : [id number] Polina SAAVEDRA              Imaging:  Imaging Results (Last 24 Hours)     ** No results found for the last 24 hours. **         PPE worn at all times washed before washed afterwards disposed of everything properly was now within 6 feet of her for more than few minutes during my exam no aerosols used at any point    Assessment and Plan:     This patient has had posterior reversible encephalopathic syndrome.  I reviewed the MRI in great detail with an independent eyeball review and it shows changes that are consistent with PRES  Note however her blood pressures are in much better control and she is currently for the most part well oriented.  Her exam is normal and at this point neurology would not recommend further neurologic work-up.  Neurology will sign off please reconsult as needed      Adonis Fabian MD  12/04/21  15:09 EST

## 2021-12-04 NOTE — PROGRESS NOTES
[facility] Progress Note      Patient Name: Zaina Martinez  : 1965  MRN: [id number]  Primary Care Physician:  Provider, No Known  Date of admission: 2021    Subjective     Interval History:   Stool incontinence  Breathing is comfortable; reports appetite is mediocre; IVF infusing  Still feels very tired    Review of Systems:   As noted above    Objective     Vitals:   Temp:  [98 °F (36.7 °C)-98.8 °F (37.1 °C)] 98.6 °F (37 °C)  Heart Rate:  [51-81] 81  Resp:  [18] 18  BP: (113-174)/() 138/104    Intake/Output Summary (Last 24 hours) at 12/3/2021 1913  Last data filed at 12/3/2021 1819  Gross per 24 hour   Intake 1240 ml   Output 550 ml   Net 690 ml       Physical Exam:    Constitutional: Awake, lethargic, very pale, chronically ill, legally blind  HEENT: Sclera anicteric, slightly puffy lips, AT/NC, improving periorbital edema  Neck: Supple, trachea at midline, no JVD, no bruit  Respiratory: Coarse BS but no wheezing, not labored on RA  Cardiovascular: RRR, no rub  Gastrointestinal: BS +, abdomen is soft, nontender and nondistended  : No palpable bladder  Musculoskeletal: No significant LE edema, no clubbing or cyanosis  Psychiatric: Flat affect; cooperative  Neurologic: moving all extremities, normal speech and mental status  Skin: Warm and dry      Scheduled Meds:     amLODIPine, 2.5 mg, Oral, Q24H  aspirin, 81 mg, Oral, Daily  escitalopram, 20 mg, Oral, Daily  hydrALAZINE, 50 mg, Oral, Q8H  insulin lispro, 0-7 Units, Subcutaneous, TID AC  [START ON 2021] levothyroxine, 175 mcg, Oral, Q AM  melatonin, 3 mg, Oral, Nightly  metoprolol tartrate, 25 mg, Oral, Q12H  pantoprazole, 40 mg, Oral, Daily  rOPINIRole, 0.75 mg, Oral, Nightly  sertraline, 100 mg, Oral, Daily  sodium chloride, 10 mL, Intravenous, Q12H      IV Meds:   sodium chloride, 100 mL/hr, Last Rate: 100 mL/hr (21 3862)        Results Reviewed:   I have personally reviewed the results from the time  of this admission to 12/3/2021 19:13 EST     Results from last 7 days   Lab Units 12/03/21  1018 12/02/21  1147 12/01/21  0636 11/30/21  0153 11/29/21 2121   SODIUM mmol/L 132* 134* 132*   < > 132*   POTASSIUM mmol/L 4.9 4.8 4.5   < > 5.0   CHLORIDE mmol/L 103 105 101   < > 97*   CO2 mmol/L 19.2* 18.7* 22.6   < > 24.1   BUN mg/dL 40* 42* 41*   < > 40*   CREATININE mg/dL 3.54* 4.22* 3.76*   < > 4.22*   CALCIUM mg/dL 7.2* 7.2* 7.8*   < > 7.9*   BILIRUBIN mg/dL  --   --   --   --  0.4   ALK PHOS U/L  --   --   --   --  88   ALT (SGPT) U/L  --   --   --   --  12   AST (SGOT) U/L  --   --   --   --  23   GLUCOSE mg/dL 105* 74 100*   < > 292*    < > = values in this interval not displayed.       Estimated Creatinine Clearance: 15.3 mL/min (A) (by C-G formula based on SCr of 3.54 mg/dL (H)).    Results from last 7 days   Lab Units 12/03/21  1018 12/02/21  1147   MAGNESIUM mg/dL  --  1.5*   PHOSPHORUS mg/dL 5.3* 5.6*             Results from last 7 days   Lab Units 12/03/21  1018 12/02/21  1147 12/01/21  0636 11/30/21  1525 11/30/21  0552 11/29/21 2121 11/29/21 2121   WBC 10*3/mm3 13.71* 10.56 12.16*  --  8.49  --  8.88   HEMOGLOBIN g/dL 10.3* 9.2* 10.0* 8.2* 6.9*   < > 8.7*   PLATELETS 10*3/mm3 235 170 184  --  130*  --  167    < > = values in this interval not displayed.             Assessment / Plan     ASSESSMENT:  1.  MAYUR vs MAYUR/CKD3, nonoliguric, improving, multifactorial:  prerenal --> ATN due to N/V, poor oral intake with ongoing weight loss, +/- ABLA, vasospasm from HTN urgency, and compromised renal autoregulation due to ARB; unsure whether left hydronephrosis significant enough to be playing any role.  FENa >1%.   2.  Facial swelling and confusion, both improving.  Probably due to myxedema from severe hypothyroidism.  In light of extremely elevated TSH, ARB as culprit for facial swelling is much less likely  3.  DM2 with poor control  4.  Anemia with dramatic drop in hemoglobin  5.  Hypertension  6.  Type II  non-NSTEMI  7.  Severe hypothyroidism.  This is probably driving her mild hyponatremia as well  8.  Medication noncompliance  9.  Confusion    PLAN:  1.  Continue normal saline until eating well  2.  Increase amlodipine  3.  Surveillance labs      Thank you for involving us in the care of Zaina Martinez.  Please feel free to call with any questions.    Alejo Lange MD  12/03/21  19:13 [facility]    [facility]  [phone]      Much of this encounter note is an electronic transcription/translation of spoken language to printed text. The electronic translation of spoken language may permit erroneous, or at times, nonsensical words or phrases to be inadvertently transcribed; Although I have reviewed the note for such errors, some may still exist.

## 2021-12-05 PROBLEM — I67.83 PRES (POSTERIOR REVERSIBLE ENCEPHALOPATHY SYNDROME): Status: ACTIVE | Noted: 2021-12-05

## 2021-12-05 LAB
ALBUMIN SERPL-MCNC: 2.1 G/DL (ref 3.5–5.2)
ANION GAP SERPL CALCULATED.3IONS-SCNC: 10 MMOL/L (ref 5–15)
BASOPHILS # BLD AUTO: 0.06 10*3/MM3 (ref 0–0.2)
BASOPHILS NFR BLD AUTO: 0.4 % (ref 0–1.5)
BUN SERPL-MCNC: 44 MG/DL (ref 6–20)
BUN/CREAT SERPL: 11.1 (ref 7–25)
CALCIUM SPEC-SCNC: 7.2 MG/DL (ref 8.6–10.5)
CHLORIDE SERPL-SCNC: 107 MMOL/L (ref 98–107)
CO2 SERPL-SCNC: 18 MMOL/L (ref 22–29)
CREAT SERPL-MCNC: 3.95 MG/DL (ref 0.57–1)
DEPRECATED RDW RBC AUTO: 50.5 FL (ref 37–54)
EOSINOPHIL # BLD AUTO: 0.09 10*3/MM3 (ref 0–0.4)
EOSINOPHIL NFR BLD AUTO: 0.6 % (ref 0.3–6.2)
ERYTHROCYTE [DISTWIDTH] IN BLOOD BY AUTOMATED COUNT: 15.9 % (ref 12.3–15.4)
FOLATE SERPL-MCNC: 5.67 NG/ML (ref 4.78–24.2)
GFR SERPL CREATININE-BSD FRML MDRD: 12 ML/MIN/1.73
GFR SERPL CREATININE-BSD FRML MDRD: ABNORMAL ML/MIN/{1.73_M2}
GLUCOSE BLDC GLUCOMTR-MCNC: 134 MG/DL (ref 70–130)
GLUCOSE BLDC GLUCOMTR-MCNC: 152 MG/DL (ref 70–130)
GLUCOSE BLDC GLUCOMTR-MCNC: 179 MG/DL (ref 70–130)
GLUCOSE BLDC GLUCOMTR-MCNC: 213 MG/DL (ref 70–130)
GLUCOSE SERPL-MCNC: 143 MG/DL (ref 65–99)
HBV SURFACE AG SERPL QL IA: NORMAL
HCT VFR BLD AUTO: 26.9 % (ref 34–46.6)
HCV AB SER DONR QL: NORMAL
HGB BLD-MCNC: 8.8 G/DL (ref 12–15.9)
IMM GRANULOCYTES # BLD AUTO: 0.05 10*3/MM3 (ref 0–0.05)
IMM GRANULOCYTES NFR BLD AUTO: 0.3 % (ref 0–0.5)
LYMPHOCYTES # BLD AUTO: 2.72 10*3/MM3 (ref 0.7–3.1)
LYMPHOCYTES NFR BLD AUTO: 17.3 % (ref 19.6–45.3)
MCH RBC QN AUTO: 28.5 PG (ref 26.6–33)
MCHC RBC AUTO-ENTMCNC: 32.7 G/DL (ref 31.5–35.7)
MCV RBC AUTO: 87.1 FL (ref 79–97)
MONOCYTES # BLD AUTO: 1.14 10*3/MM3 (ref 0.1–0.9)
MONOCYTES NFR BLD AUTO: 7.2 % (ref 5–12)
NEUTROPHILS NFR BLD AUTO: 11.68 10*3/MM3 (ref 1.7–7)
NEUTROPHILS NFR BLD AUTO: 74.2 % (ref 42.7–76)
NRBC BLD AUTO-RTO: 0 /100 WBC (ref 0–0.2)
PHOSPHATE SERPL-MCNC: 5.5 MG/DL (ref 2.5–4.5)
PLATELET # BLD AUTO: 225 10*3/MM3 (ref 140–450)
PMV BLD AUTO: 11.4 FL (ref 6–12)
POTASSIUM SERPL-SCNC: 5.1 MMOL/L (ref 3.5–5.2)
RBC # BLD AUTO: 3.09 10*6/MM3 (ref 3.77–5.28)
SODIUM SERPL-SCNC: 135 MMOL/L (ref 136–145)
VIT B12 BLD-MCNC: 448 PG/ML (ref 211–946)
WBC NRBC COR # BLD: 15.74 10*3/MM3 (ref 3.4–10.8)

## 2021-12-05 PROCEDURE — 80069 RENAL FUNCTION PANEL: CPT | Performed by: INTERNAL MEDICINE

## 2021-12-05 PROCEDURE — 82962 GLUCOSE BLOOD TEST: CPT

## 2021-12-05 PROCEDURE — 82784 ASSAY IGA/IGD/IGG/IGM EACH: CPT | Performed by: INTERNAL MEDICINE

## 2021-12-05 PROCEDURE — 82746 ASSAY OF FOLIC ACID SERUM: CPT | Performed by: INTERNAL MEDICINE

## 2021-12-05 PROCEDURE — 86160 COMPLEMENT ANTIGEN: CPT | Performed by: INTERNAL MEDICINE

## 2021-12-05 PROCEDURE — 86256 FLUORESCENT ANTIBODY TITER: CPT | Performed by: INTERNAL MEDICINE

## 2021-12-05 PROCEDURE — 86038 ANTINUCLEAR ANTIBODIES: CPT | Performed by: INTERNAL MEDICINE

## 2021-12-05 PROCEDURE — 82607 VITAMIN B-12: CPT | Performed by: INTERNAL MEDICINE

## 2021-12-05 PROCEDURE — 86803 HEPATITIS C AB TEST: CPT | Performed by: INTERNAL MEDICINE

## 2021-12-05 PROCEDURE — 63710000001 INSULIN LISPRO (HUMAN) PER 5 UNITS: Performed by: HOSPITALIST

## 2021-12-05 PROCEDURE — 83520 IMMUNOASSAY QUANT NOS NONAB: CPT | Performed by: INTERNAL MEDICINE

## 2021-12-05 PROCEDURE — 86334 IMMUNOFIX E-PHORESIS SERUM: CPT | Performed by: INTERNAL MEDICINE

## 2021-12-05 PROCEDURE — 86431 RHEUMATOID FACTOR QUANT: CPT | Performed by: INTERNAL MEDICINE

## 2021-12-05 PROCEDURE — 25010000002 CEFTRIAXONE PER 250 MG: Performed by: HOSPITALIST

## 2021-12-05 PROCEDURE — 99231 SBSQ HOSP IP/OBS SF/LOW 25: CPT | Performed by: INTERNAL MEDICINE

## 2021-12-05 PROCEDURE — 87340 HEPATITIS B SURFACE AG IA: CPT | Performed by: INTERNAL MEDICINE

## 2021-12-05 PROCEDURE — 85025 COMPLETE CBC W/AUTO DIFF WBC: CPT | Performed by: INTERNAL MEDICINE

## 2021-12-05 RX ORDER — SODIUM BICARBONATE 650 MG/1
1300 TABLET ORAL 3 TIMES DAILY
Status: DISCONTINUED | OUTPATIENT
Start: 2021-12-05 | End: 2021-12-13

## 2021-12-05 RX ADMIN — ASPIRIN 81 MG: 81 TABLET, CHEWABLE ORAL at 10:06

## 2021-12-05 RX ADMIN — LEVOTHYROXINE SODIUM 175 MCG: 0.17 TABLET ORAL at 06:16

## 2021-12-05 RX ADMIN — METOPROLOL TARTRATE 25 MG: 25 TABLET, FILM COATED ORAL at 21:29

## 2021-12-05 RX ADMIN — SODIUM BICARBONATE 1300 MG: 650 TABLET ORAL at 17:13

## 2021-12-05 RX ADMIN — SODIUM BICARBONATE 650 MG: 650 TABLET ORAL at 10:06

## 2021-12-05 RX ADMIN — ESCITALOPRAM 20 MG: 20 TABLET, FILM COATED ORAL at 10:05

## 2021-12-05 RX ADMIN — INSULIN LISPRO 3 UNITS: 100 INJECTION, SOLUTION INTRAVENOUS; SUBCUTANEOUS at 17:19

## 2021-12-05 RX ADMIN — ROPINIROLE HYDROCHLORIDE 0.75 MG: 0.5 TABLET, FILM COATED ORAL at 21:29

## 2021-12-05 RX ADMIN — SODIUM BICARBONATE 1300 MG: 650 TABLET ORAL at 21:29

## 2021-12-05 RX ADMIN — INSULIN LISPRO 2 UNITS: 100 INJECTION, SOLUTION INTRAVENOUS; SUBCUTANEOUS at 11:37

## 2021-12-05 RX ADMIN — HYDRALAZINE HYDROCHLORIDE 25 MG: 50 TABLET, FILM COATED ORAL at 06:16

## 2021-12-05 RX ADMIN — SODIUM CHLORIDE, PRESERVATIVE FREE 10 ML: 5 INJECTION INTRAVENOUS at 10:08

## 2021-12-05 RX ADMIN — Medication 3 MG: at 21:29

## 2021-12-05 RX ADMIN — HYDRALAZINE HYDROCHLORIDE 25 MG: 50 TABLET, FILM COATED ORAL at 14:11

## 2021-12-05 RX ADMIN — PANTOPRAZOLE SODIUM 40 MG: 40 TABLET, DELAYED RELEASE ORAL at 10:10

## 2021-12-05 RX ADMIN — AMLODIPINE BESYLATE 5 MG: 5 TABLET ORAL at 10:05

## 2021-12-05 RX ADMIN — SODIUM CHLORIDE, PRESERVATIVE FREE 10 ML: 5 INJECTION INTRAVENOUS at 21:30

## 2021-12-05 RX ADMIN — SERTRALINE 100 MG: 100 TABLET, FILM COATED ORAL at 10:05

## 2021-12-05 RX ADMIN — HYDRALAZINE HYDROCHLORIDE 25 MG: 50 TABLET, FILM COATED ORAL at 21:29

## 2021-12-05 RX ADMIN — SODIUM CHLORIDE 75 ML/HR: 9 INJECTION, SOLUTION INTRAVENOUS at 02:02

## 2021-12-05 RX ADMIN — CEFTRIAXONE 1 G: 1 INJECTION, POWDER, FOR SOLUTION INTRAMUSCULAR; INTRAVENOUS at 21:29

## 2021-12-05 NOTE — PROGRESS NOTES
[facility]  Inpatient Progress Note    Reason for Follow Up: Abdominal pain, diarrhea, enteritis    Subjective     Interval History: H&H stable.  Patient denies any further diarrhea or abdominal pain.  Advise she may warrant further evaluation of her anemia but this can be up in the outpatient setting if all agree stable for discharge.      Current Facility-Administered Medications:   •  acetaminophen (TYLENOL) tablet 650 mg, 650 mg, Oral, Q4H PRN **OR** [DISCONTINUED] acetaminophen (TYLENOL) 160 MG/5ML solution 650 mg, 650 mg, Oral, Q4H PRN **OR** [DISCONTINUED] acetaminophen (TYLENOL) suppository 650 mg, 650 mg, Rectal, Q4H PRN, Farida Laboy APRN  •  amLODIPine (NORVASC) tablet 5 mg, 5 mg, Oral, Q24H, Frederic Powell MD, 5 mg at 12/05/21 1005  •  aspirin chewable tablet 81 mg, 81 mg, Oral, Daily, Farida Laboy APRN, 81 mg at 12/05/21 1006  •  cefTRIAXone (ROCEPHIN) 1 g in sodium chloride 0.9 % 100 mL IVPB-VTB, 1 g, Intravenous, Q24H, Fran Yo MD, Last Rate: 200 mL/hr at 12/04/21 2130, 1 g at 12/04/21 2130  •  dextrose (D50W) (25 g/50 mL) IV injection 25 g, 25 g, Intravenous, Q15 Min PRN, Farida Laboy APRN  •  dextrose (GLUTOSE) oral gel 15 g, 15 g, Oral, Q15 Min PRN, Farida Laboy APRN  •  escitalopram (LEXAPRO) tablet 20 mg, 20 mg, Oral, Daily, Farida Laboy APRN, 20 mg at 12/05/21 1005  •  glucagon (human recombinant) (GLUCAGEN DIAGNOSTIC) injection 1 mg, 1 mg, Subcutaneous, PRN, Farida Laboy APRN  •  hydrALAZINE (APRESOLINE) tablet 25 mg, 25 mg, Oral, Q8H, Frederic Powell MD, 25 mg at 12/05/21 0616  •  insulin lispro (ADMELOG) injection 0-7 Units, 0-7 Units, Subcutaneous, TID AC, Fran Yo MD, 2 Units at 12/04/21 1732  •  labetalol (NORMODYNE,TRANDATE) injection 10 mg, 10 mg, Intravenous, Q6H PRN, Farida Laboy, APRN, 10 mg at 12/01/21 1340  •  levothyroxine (SYNTHROID, LEVOTHROID) tablet 175 mcg,  175 mcg, Oral, Q AM, Fran Yo MD, 175 mcg at 12/05/21 0616  •  loperamide (IMODIUM) capsule 2 mg, 2 mg, Oral, 4x Daily PRN, Fran Yo MD, 2 mg at 12/03/21 1519  •  melatonin tablet 3 mg, 3 mg, Oral, Nightly, Farida Laboy APRN, 3 mg at 12/04/21 2130  •  metoprolol tartrate (LOPRESSOR) tablet 25 mg, 25 mg, Oral, Q12H, Ania Agrawal MD, 25 mg at 12/04/21 2131  •  nitroglycerin (NITROSTAT) SL tablet 0.4 mg, 0.4 mg, Sublingual, Q5 Min PRN, Farida Laboy APRN  •  pantoprazole (PROTONIX) EC tablet 40 mg, 40 mg, Oral, Daily, Farida Laboy APRN, 40 mg at 12/05/21 1010  •  prochlorperazine (COMPAZINE) injection 5 mg, 5 mg, Intravenous, Q6H PRN, Farida Laboy APRN  •  rOPINIRole (REQUIP) tablet 0.75 mg, 0.75 mg, Oral, Nightly, Farida Laboy APRN, 0.75 mg at 12/04/21 2129  •  sertraline (ZOLOFT) tablet 100 mg, 100 mg, Oral, Daily, Farida Laboy APRN, 100 mg at 12/05/21 1005  •  sodium bicarbonate tablet 650 mg, 650 mg, Oral, TID, Frederic Powell MD, 650 mg at 12/05/21 1006  •  sodium chloride 0.9 % flush 10 mL, 10 mL, Intravenous, Q12H, Farida Laboy APRN, 10 mL at 12/05/21 1008  •  sodium chloride 0.9 % flush 10 mL, 10 mL, Intravenous, PRN, Farida Laboy APRN  Review of Systems:    All systems were reviewed and negative except for:  Gastrointestinal: positive for  See HPI    Objective     Vital Signs  Temp:  [98.2 °F (36.8 °C)-99.1 °F (37.3 °C)] 98.2 °F (36.8 °C)  Heart Rate:  [54-61] 57  Resp:  [16-18] 18  BP: (120-147)/(57-84) 141/84  Body mass index is 24.69 kg/m².    Intake/Output Summary (Last 24 hours) at 12/5/2021 1012  Last data filed at 12/5/2021 0626  Gross per 24 hour   Intake 1503 ml   Output 475 ml   Net 1028 ml     No intake/output data recorded.     Physical Exam:   General: patient awake, alert and cooperative   Eyes: Normal lids and lashes, no scleral icterus   Neck: supple, normal ROM   Skin: warm and dry, not  jaundiced   Cardiovascular: regular rhythm and rate, no murmurs auscultated   Pulm: clear to auscultation bilaterally, regular and unlabored   Abdomen: soft, nontender, nondistended; normal bowel sounds   Extremities: no rash or edema   Psychiatric: Normal mood and behavior; memory intact     Results Review:     I reviewed the patient's new clinical results.    Results from last 7 days   Lab Units 12/05/21 0414 12/04/21  0604 12/03/21  1018   WBC 10*3/mm3 15.74* 12.86* 13.71*   HEMOGLOBIN g/dL 8.8* 8.6* 10.3*   HEMATOCRIT % 26.9* 25.4* 30.4*   PLATELETS 10*3/mm3 225 201 235     Results from last 7 days   Lab Units 12/05/21 0414 12/04/21  0604 12/03/21  1018 11/30/21  0153 11/29/21  2121   SODIUM mmol/L 135* 134* 132*   < > 132*   POTASSIUM mmol/L 5.1 4.8 4.9   < > 5.0   CHLORIDE mmol/L 107 103 103   < > 97*   CO2 mmol/L 18.0* 19.7* 19.2*   < > 24.1   BUN mg/dL 44* 41* 40*   < > 40*   CREATININE mg/dL 3.95* 4.21* 3.54*   < > 4.22*   CALCIUM mg/dL 7.2* 6.8* 7.2*   < > 7.9*   BILIRUBIN mg/dL  --   --   --   --  0.4   ALK PHOS U/L  --   --   --   --  88   ALT (SGPT) U/L  --   --   --   --  12   AST (SGOT) U/L  --   --   --   --  23   GLUCOSE mg/dL 143* 109* 105*   < > 292*    < > = values in this interval not displayed.         No results found for: LIPASE    Radiology:  MRI Brain With & Without Contrast   Final Result   The study was hampered by patient motion. No evidence of   acute infarction. Areas of T2 FLAIR hyperintensity involving the   periventricular and to a lesser extent deep white matter of the cerebral   hemispheres bilaterally. Small foci of increased signal intensity   involving the white matter of the left cerebellar hemisphere was also   appreciated. There was no evidence of abnormal enhancement. The   appearance is nonspecific. The areas of T2 FLAIR hyperintensity   supratentorially may represent small vessel ischemic disease. The   cerebellar areas of T2 FLAIR hyperintensity are nonspecific. This  may   relate to PRES. Clinical correlation and a follow-up MRI examination the   brain with and without contrast is suggested. There is no evidence of   abnormal enhancement or hemosiderosis.       This report was finalized on 12/3/2021 11:00 AM by Dr. Osmar Nelson M.D.          CT Abdomen Pelvis Without Contrast   Final Result   1. Minimal left hydronephrosis without obvious obstruction. Bladder wall   is diffusely thickened, which may be the cause of the minimal   hydronephrosis   2. Multiple thickened small bowel loops within the mid and left abdomen   suggesting enteritis. No evidence for bowel obstruction   3. Edema within the mesentery and very minimal ascites. Diffuse   anasarca.   4. Cholelithiasis       Radiation dose reduction techniques were utilized, including automated   exposure control and exposure modulation based on body size.       This report was finalized on 12/2/2021 8:54 AM by Dr. Bijan Ordoñez M.D.          US Renal Bilateral   Final Result      XR Chest 1 View   Final Result   No acute findings.       This report was finalized on 11/29/2021 9:55 PM by Dr. Cherri Rodriguez M.D.              Assessment/Plan     Patient Active Problem List   Diagnosis   • MAYUR (acute kidney injury) (HCC)   • HTN (hypertension)   • Hypothyroidism   • Type 2 diabetes mellitus with hyperglycemia, with long-term current use of insulin (HCC)   • Rheumatoid arthritis (HCC)   • Angioedema   • GERD (gastroesophageal reflux disease)   • Anemia   • Medically noncompliant   • Myocardial infarction due to demand ischemia (HCC)   • Enteritis       Assessment:  1. Diarrhea: Resolved  2. Abdominal pain: Resolved  3. Abnormal CT scan with evidence of enteritis  4. MAYUR  5. Anemia      Plan:  · Acute issues seem to have resolved, should be stable for discharge in the near future pending clearance by other services.  I discussed the patients findings and my recommendations with patient.    Frederic Romero,  MD

## 2021-12-05 NOTE — PROGRESS NOTES
[facility] Medicine Services  PROGRESS NOTE    Patient Name: Zaina Martinez  : 1965  MRN: [id number]    Date of Admission: 2021  Primary Care Physician: Provider, No Known    Subjective   Subjective     CC:  Follow-up renal failure    HPI:  Nursing report patient is oriented this morning.  Patient is able to walk answer orientation questions correctly.  She is resting comfortably in bed and denies complaint.  Patient admits she has been missing her Synthroid at home recently.  She is a somewhat poor historian but holds conversation decently.  She denies any GI complaints or other new complaints.    Review of Systems  No current fevers or chills  No current shortness of breath or cough  No current nausea, vomiting, or diarrhea  No current chest pain or palpitations      Objective   Objective     Vital Signs:   Temp:  [98.2 °F (36.8 °C)-99.1 °F (37.3 °C)] 98.2 °F (36.8 °C)  Heart Rate:  [54-61] 57  Resp:  [16-18] 18  BP: (120-147)/(57-84) 141/84  Total (NIH Stroke Scale): 2     Physical Exam:  Constitutional:Awake, alert  HENT: NCAT, mucous membranes moist, neck supple  Respiratory: Clear to auscultation bilaterally, respiratory effort normal, nonlabored breathing   Cardiovascular: RRR, normal radial pulses  Gastrointestinal: Positive bowel sounds, soft, nontender, nondistended  Musculoskeletal: Normal musculature for age, no lower extremity edema, BMI 25  Psychiatric: Appropriate affect, cooperative, conversational  Neurologic: Oriented x3, no slurred speech or facial droop, follows commands  Skin: No rashes or jaundice, warm      Results Reviewed:  Results from last 7 days   Lab Units 21  0414 21  0604 21  1018   WBC 10*3/mm3 15.74* 12.86* 13.71*   HEMOGLOBIN g/dL 8.8* 8.6* 10.3*   HEMATOCRIT % 26.9* 25.4* 30.4*   PLATELETS 10*3/mm3 225 201 235     Results from last 7 days   Lab Units 21  0414 21  0604 21  1018 21  0636 21  0153 211  11/29/21 2121   SODIUM mmol/L 135* 134* 132*   < > 132*   < > 132*   POTASSIUM mmol/L 5.1 4.8 4.9   < > 5.0   < > 5.0   CHLORIDE mmol/L 107 103 103   < > 100   < > 97*   CO2 mmol/L 18.0* 19.7* 19.2*   < > 23.2   < > 24.1   BUN mg/dL 44* 41* 40*   < > 41*   < > 40*   CREATININE mg/dL 3.95* 4.21* 3.54*   < > 4.02*   < > 4.22*   GLUCOSE mg/dL 143* 109* 105*   < > 293*   < > 292*   CALCIUM mg/dL 7.2* 6.8* 7.2*   < > 7.6*   < > 7.9*   ALT (SGPT) U/L  --   --   --   --   --   --  12   AST (SGOT) U/L  --   --   --   --   --   --  23   TROPONIN T ng/mL  --   --   --   --  0.117*  --  0.132*   PROBNP pg/mL  --   --   --   --   --   --  5,443.0*    < > = values in this interval not displayed.     Estimated Creatinine Clearance: 13.7 mL/min (A) (by C-G formula based on SCr of 3.95 mg/dL (H)).    Microbiology Results Abnormal     Procedure Component Value - Date/Time    Clostridium Difficile Toxin, PCR - Stool, Per Rectum [[id number]]  (Normal) Collected: 12/02/21 0813    Lab Status: Final result Specimen: Stool from Per Rectum Updated: 12/02/21 0913     C. Difficile Toxins by PCR Negative    COVID PRE-OP / PRE-PROCEDURE SCREENING ORDER (NO ISOLATION) - Swab, Nasopharynx [[id number]]  (Normal) Collected: 11/29/21 2343    Lab Status: Final result Specimen: Swab from Nasopharynx Updated: 11/30/21 3506    Narrative:      The following orders were created for panel order COVID PRE-OP / PRE-PROCEDURE SCREENING ORDER (NO ISOLATION) - Swab, Nasopharynx.  Procedure                               Abnormality         Status                     ---------                               -----------         ------                     COVID-19,APTIMA PANTHER(...[[id number]]  Normal              Final result                 Please view results for these tests on the individual orders.    COVID-19,APTIMA PANTHER(MED), NAZ, NP/OP SWAB IN UTM/VTM/SALINE TRANSPORT MEDIA,24 HR TAT - Swab, Nasopharynx [[id number]]  (Normal) Collected: 11/29/21 8037     Lab Status: Final result Specimen: Swab from Nasopharynx Updated: 11/30/21 0444     COVID19 Not Detected    Narrative:      Fact sheet for providers: https://www.fda.gov/media/924910/download     Fact sheet for patients: https://www.fda.gov/media/090820/download    Test performed by RT PCR.          Imaging Results (Last 24 Hours)     ** No results found for the last 24 hours. **          Results for orders placed during the hospital encounter of 11/29/21    Adult Transthoracic Echo Complete W/ Cont if Necessary Per Protocol    Interpretation Summary  · Calculated left ventricular EF = 61.8% Estimated left ventricular EF was in agreement with the calculated left ventricular EF. Left ventricular systolic function is normal.  · Left ventricular diastolic function is consistent with (grade II w/high LAP) pseudonormalization.  · The left atrial cavity is mildly dilated.  · Trace tricuspid valve regurgitation is present. Calculated right ventricular systolic pressure from tricuspid regurgitation is 21.4 mmHg.      I have reviewed the medications:  Scheduled Meds:amLODIPine, 5 mg, Oral, Q24H  aspirin, 81 mg, Oral, Daily  cefTRIAXone, 1 g, Intravenous, Q24H  escitalopram, 20 mg, Oral, Daily  hydrALAZINE, 25 mg, Oral, Q8H  insulin lispro, 0-7 Units, Subcutaneous, TID AC  levothyroxine, 175 mcg, Oral, Q AM  melatonin, 3 mg, Oral, Nightly  metoprolol tartrate, 25 mg, Oral, Q12H  pantoprazole, 40 mg, Oral, Daily  rOPINIRole, 0.75 mg, Oral, Nightly  sertraline, 100 mg, Oral, Daily  sodium bicarbonate, 650 mg, Oral, TID  sodium chloride, 10 mL, Intravenous, Q12H      Continuous Infusions:   PRN Meds:.•  acetaminophen **OR** [DISCONTINUED] acetaminophen **OR** [DISCONTINUED] acetaminophen  •  dextrose  •  dextrose  •  glucagon (human recombinant)  •  labetalol  •  loperamide  •  nitroglycerin  •  prochlorperazine  •  sodium chloride    Assessment/Plan   Assessment & Plan     Active Hospital Problems    Diagnosis  POA   • **MAYUR  (acute kidney injury) (HCC) [N17.9]  Yes   • PRES (posterior reversible encephalopathy syndrome) [I67.83]  Yes   • Enteritis [K52.9]  Clinically Undetermined   • Medically noncompliant [Z91.19]  Not Applicable   • Myocardial infarction due to demand ischemia (HCC) [I21.A1]  Yes   • Angioedema [T78.3XXA]  Yes   • Anemia [D64.9]  Yes   • HTN (hypertension) [I10]  Yes   • Hypothyroidism [E03.9]  Yes   • Type 2 diabetes mellitus with hyperglycemia, with long-term current use of insulin (HCC) [E11.65, Z79.4]  Not Applicable   • Rheumatoid arthritis (HCC) [M06.9]  Yes      Resolved Hospital Problems    Diagnosis Date Resolved POA   • Hypertensive urgency [I16.0] 11/30/2021 Yes        Brief Hospital Course to date:  Zaina Martinez is a 56 y.o. female presents with acute kidney injury, enteritis, hypothyroidism, and acute confusion felt to be related to PRES.      Discussion/plan for today:  Renal function slightly improved from yesterday.  Continue to monitor urine output, renally dose medication, and avoid nephrotoxins.  Appreciate nephrology recommendations.    PRES appears to be resolving.  Patient is now oriented but is still a somewhat poor historian.  Neurology has signed off.  Continue monitoring orientation.  Avoid fluctuations of blood pressure if possible.    Hypothyroidism: TSH is severely elevated.  Restart home Synthroid.  Recommend she have free T4 and TSH within 1 month to continue to monitor.  Patient admits to missing her thyroid medication.  I explained to her that this is a very important medication.    Enteritis and UTI:  Abdominal symptoms resolved.  Completed course of ceftriaxone.    Diabetes: Monitor glucose and adjust as needed.  A1c 9.8.  Poorly controlled.    Demand ischemia: Medical management.  Cardiology has evaluated.  Normal echocardiogram.  Needs outpatient follow-up with cardiology.  She agrees with this plan.    Severe anemia:  Status post transfusion.  Hemoccult negative.  No active  bleeding reported.  Normocytic.  Mostly consistent with anemia of chronic disease or chronic kidney disease.  Check B12 and folic acid.    DVT Prophylaxis: Mechanical      Disposition: Pending    CODE STATUS:   Code Status and Medical Interventions:   Ordered at: 11/30/21 0115     Code Status (Patient has no pulse and is not breathing):    CPR (Attempt to Resuscitate)     Medical Interventions (Patient has pulse or is breathing):    Full Support       Osmar Alva MD  12/05/21

## 2021-12-05 NOTE — CONSULTS
Patient Name: Zaina Martinez Account #: [id number]    MRN: [id number] Admission Date: 11/29/2021      Consulting Service: Vascular Surgery Date of Evaluation: December 5, 2021    Requesting Provider: Dr. Frederic Powell MD    CHIEF COMPLAINT: Acute renal failure progressing to end-stage renal disease  HPI: Zaina Martinez is a 56 y.o. female is being seen for a consultation and evaluation/management of acute renal failure progressing to end-stage renal disease.  Patient has uremic symptoms and progressive disease that is appearing to be permanent.  We have been asked to place tunneled dialysis catheter and also evaluate for AV fistula placement eventually.  She may or may not be a candidate for peritoneal dialysis but regardless initiation of hemodialysis is warranted and a backup fistula at the least would be a good idea.  I discussed with the patient her  and her family pathophysiology of end-stage renal failure access and the issues surrounding permanent versus temporary access to this.  I recommended tunneled dialysis catheter.  We will try to get her on the schedule for tomorrow but is possible will have to wait till Tuesday depending on OR time availability.  Patient and family understand these issues.    PAST MEDICAL HISTORY:   Past Medical History:   Diagnosis Date   • Diabetes (HCC)    • Disease of thyroid gland    • GERD (gastroesophageal reflux disease)    • Hypertension    • Rheumatoid arthritis (HCC)       PAST SURGICAL HISTORY:   Past Surgical History:   Procedure Laterality Date   • EYE SURGERY     • HYSTERECTOMY        FAMILY HISTORY: History reviewed. No pertinent family history.   SOCIAL HISTORY:   Social History     Tobacco Use   • Smoking status: Never Smoker   • Smokeless tobacco: Never Used   Substance Use Topics   • Alcohol use: Not on file   • Drug use: Not on file      MEDICATIONS:   No current facility-administered medications on file prior to encounter.     Current Outpatient  Medications on File Prior to Encounter   Medication Sig Dispense Refill   • aspirin 81 MG chewable tablet Chew 81 mg Daily.     • escitalopram (LEXAPRO) 20 MG tablet Take 20 mg by mouth Daily.     • glucose blood (Accu-Chek Liz Plus) test strip 4 (Four) Times a Day.     • glucose blood (Accu-Chek Guide) test strip 1 each by Other route 4 (Four) Times a Day.     • insulin lispro (humaLOG) 100 UNIT/ML injection Inject  under the skin into the appropriate area as directed 3 (Three) Times a Day.     • levothyroxine (SYNTHROID, LEVOTHROID) 175 MCG tablet Take 175 mcg by mouth Daily.     • losartan (COZAAR) 50 MG tablet TAKE ONE TABLET BY MOUTH EVERY DAY AS DIRECTED- DUE FOR CHECK UP     • pantoprazole (PROTONIX) 40 MG EC tablet Take 40 mg by mouth Daily.     • rOPINIRole (REQUIP) 0.25 MG tablet Take 3 tablets by mouth every night at bedtime.     • sertraline (ZOLOFT) 100 MG tablet Take 100 mg by mouth Daily.     • temazepam (RESTORIL) 7.5 MG capsule Take 7.5 mg by mouth.               ALLERGIES: Patient has no known allergies.   COMPLETE REVIEW OF SYSTEMS:     ENT ROS: negative  Cardiovascular ROS: no chest pain or dyspnea on exertion  Respiratory ROS: no cough, shortness of breath, or wheezing  Gastrointestinal ROS: no abdominal pain, change in bowel habits, or black or bloody stools  Neurological ROS: no TIA or stroke symptoms  Genito-Urinary ROS: no dysuria, trouble voiding, or hematuria  Musculoskeletal ROS: negative  Dermatological ROS: negative  Psychological ROS: negative      PHYSICAL EXAM:   Patient Vitals for the past 24 hrs:   BP Temp Temp src Pulse Resp SpO2   12/05/21 1411 137/66 98.6 °F (37 °C) Oral 58 16 96 %   12/05/21 0753 141/84 98.2 °F (36.8 °C) Oral -- 18 --   12/05/21 0616 129/73 -- -- 57 -- --   12/04/21 2322 127/73 99.1 °F (37.3 °C) Oral 54 16 97 %   12/04/21 2135 147/73 -- -- 61 -- --   12/04/21 1923 120/57 98.9 °F (37.2 °C) Oral 61 16 97 %        General appearance: alert, well appearing, and  in no distress, oriented to person, place, and time, in mild to moderate distress and chronically ill appearing.  Neurological exam reveals alert, oriented, normal speech, no focal findings or movement disorder noted.  ENT exam reveals - ENT exam normal, no neck nodes or sinus tenderness.  Positive facial edema   CVS exam: normal rate, regular rhythm, normal S1, S2, no murmurs, rubs, clicks or gallops.  Chest: clear to auscultation, no wheezes, rales or rhonchi, symmetric air entry.  Abdominal exam: soft, nontender, nondistended, no masses or organomegaly.  Examination of the feet reveals warm, good capillary refill.  No edema  All pulses +2 nonpalpable    LABS:      Results Review:       I reviewed the patient's new clinical results.  Results from last 7 days   Lab Units 12/05/21 0414 12/04/21 0604 12/03/21  1018 12/02/21  1147 12/01/21  0636 11/30/21  1525 11/30/21  0552 11/29/21 2121 11/29/21 2121   WBC 10*3/mm3 15.74* 12.86* 13.71* 10.56 12.16*  --  8.49  --  8.88   HEMOGLOBIN g/dL 8.8* 8.6* 10.3* 9.2* 10.0* 8.2* 6.9*   < > 8.7*   PLATELETS 10*3/mm3 225 201 235 170 184  --  130*  --  167    < > = values in this interval not displayed.     Results from last 7 days   Lab Units 12/05/21 0414 12/04/21  0604 12/03/21  1018 12/02/21  1147 12/01/21  0636 11/30/21  0153 11/29/21  2121   SODIUM mmol/L 135* 134* 132* 134* 132* 132* 132*   POTASSIUM mmol/L 5.1 4.8 4.9 4.8 4.5 5.0 5.0   CHLORIDE mmol/L 107 103 103 105 101 100 97*   CO2 mmol/L 18.0* 19.7* 19.2* 18.7* 22.6 23.2 24.1   BUN mg/dL 44* 41* 40* 42* 41* 41* 40*   CREATININE mg/dL 3.95* 4.21* 3.54* 4.22* 3.76* 4.02* 4.22*   GLUCOSE mg/dL 143* 109* 105* 74 100* 293* 292*   Estimated Creatinine Clearance: 13.7 mL/min (A) (by C-G formula based on SCr of 3.95 mg/dL (H)).  Results from last 7 days   Lab Units 12/05/21  0414 12/04/21  0604 12/03/21  1018 12/02/21  1147 12/01/21  0636 11/30/21  0153 11/29/21  2121   CALCIUM mg/dL 7.2* 6.8* 7.2* 7.2* 7.8* 7.6* 7.9*    ALBUMIN g/dL 2.10* 2.00* 2.10* 2.10*  --   --  2.70*   MAGNESIUM mg/dL  --  1.4*  --  1.5*  --   --   --    PHOSPHORUS mg/dL 5.5* 5.4* 5.3* 5.6*  --   --   --          CT MRI ultrasound of the renals and chest x-ray all reviewed  The following radiologic or non-invasive studies have been reviewed by me: CT MRI ultrasound of the renals and chest x-ray all reviewed    Active Hospital Problems    Diagnosis  POA   • **MAYUR (acute kidney injury) (HCC) [N17.9]  Yes   • PRES (posterior reversible encephalopathy syndrome) [I67.83]  Yes   • Enteritis [K52.9]  Clinically Undetermined   • Medically noncompliant [Z91.19]  Not Applicable   • Myocardial infarction due to demand ischemia ([facility]) [I21.A1]  Yes   • Angioedema [T78.3XXA]  Yes   • Anemia [D64.9]  Yes   • HTN (hypertension) [I10]  Yes   • Hypothyroidism [E03.9]  Yes   • Type 2 diabetes mellitus with hyperglycemia, with long-term current use of insulin ([facility]) [E11.65, Z79.4]  Not Applicable   • Rheumatoid arthritis ([facility]) [M06.9]  Yes      Resolved Hospital Problems    Diagnosis Date Resolved POA   • Hypertensive urgency [I16.0] 11/30/2021 Yes         ASSESSMENT/PLAN: 56 y.o. female with acute renal failure progression end-stage renal failure.  Patient is in need of tunneled dialysis catheter placement and possibly eventual long-term permanent fistula placement.  Regardless of whether she elects to have peritoneal dialysis or hemodialysis long-term her best answer is to have a backup fistula at least.  At this juncture we will see what vein is available and hope to preserve some.  In the meantime we will try to get her on the schedule for tomorrow for tunneled catheter.  She and her family understand risk benefits complications of that procedure and agree.  They are aware there is possible that we may have to do this Tuesday depending on OR availability but I will keep her n.p.o. after midnight tonight.      I discussed the plan with the patient and her family and they are  agreeable to the plan of care at this point. Thank you for this consult.     Mat Cummings MD   12/05/21

## 2021-12-05 NOTE — PLAN OF CARE
Goal Outcome Evaluation:  Plan of Care Reviewed With: patient   Progress: no change    Outcome Summary: No major issues over shift, VS stable, pt remains alert and oriented, dozing on and off during day, no complaints of pain, burch catheter discontinued and removed around 1535, renal doctor also spoke with pt and family about the potential need for dialysis in the near future, vascular surgical consult placed, pt agreeable to placement of a tunnelled catheter, consent signed, pt NPO after midnight for possible procedure tomorrow (though this may happen Tuesday), otherwise no other changes, will continue to monitor

## 2021-12-05 NOTE — PROGRESS NOTES
"   LOS: 6 days    Patient Care Team:  Provider, No Known as PCP - General    Chief Complaint:    Chief Complaint   Patient presents with   • Altered Mental Status     Follow UP MAYUR CKD4  Subjective     Interval History:   Nausea better but son reports extensive sx at home with frequent emesis and poor oral intake; no dyspnea   UOP only 475 cc/24h  Objective     Vital Signs  Temp:  [98.2 °F (36.8 °C)-99.1 °F (37.3 °C)] 98.6 °F (37 °C)  Heart Rate:  [54-61] 58  Resp:  [16-18] 16  BP: (120-147)/(57-84) 137/66    Flowsheet Rows      First Filed Value   Admission Height 157.5 cm (62\") Documented at 11/29/2021 2011   Admission Weight 59 kg (130 lb) Documented at 11/30/2021 0200          I/O this shift:  In: 120 [P.O.:120]  Out: 300 [Urine:300]  I/O last 3 completed shifts:  In: 1503 [I.V.:1403; IV Piggyback:100]  Out: 900 [Urine:900]    Intake/Output Summary (Last 24 hours) at 12/5/2021 1441  Last data filed at 12/5/2021 1411  Gross per 24 hour   Intake 1623 ml   Output 775 ml   Net 848 ml       Physical Exam:  General Appearance: more alert today, no distress, +facial edema  Neck supple no JVD  Lungs CTA bilat no rales  CV RRR no m/g  abd soft NT/ND  vasc no pedal/ankle edema 2+ radial pulses                      Results Review:    Results from last 7 days   Lab Units 12/05/21  0414 12/04/21  0604 12/03/21  1018 11/30/21  0153 11/29/21  2121   SODIUM mmol/L 135* 134* 132*   < > 132*   POTASSIUM mmol/L 5.1 4.8 4.9   < > 5.0   CHLORIDE mmol/L 107 103 103   < > 97*   CO2 mmol/L 18.0* 19.7* 19.2*   < > 24.1   BUN mg/dL 44* 41* 40*   < > 40*   CREATININE mg/dL 3.95* 4.21* 3.54*   < > 4.22*   CALCIUM mg/dL 7.2* 6.8* 7.2*   < > 7.9*   BILIRUBIN mg/dL  --   --   --   --  0.4   ALK PHOS U/L  --   --   --   --  88   ALT (SGPT) U/L  --   --   --   --  12   AST (SGOT) U/L  --   --   --   --  23   GLUCOSE mg/dL 143* 109* 105*   < > 292*    < > = values in this interval not displayed.       Estimated Creatinine Clearance: 13.7 mL/min " (A) (by C-G formula based on SCr of 3.95 mg/dL (H)).    Results from last 7 days   Lab Units 12/05/21  0414 12/04/21  0604 12/03/21  1018 12/02/21  1147 12/02/21  1147   MAGNESIUM mg/dL  --  1.4*  --   --  1.5*   PHOSPHORUS mg/dL 5.5* 5.4* 5.3*   < > 5.6*    < > = values in this interval not displayed.             Results from last 7 days   Lab Units 12/05/21  0414 12/04/21  0604 12/03/21  1018 12/02/21  1147 12/01/21  0636   WBC 10*3/mm3 15.74* 12.86* 13.71* 10.56 12.16*   HEMOGLOBIN g/dL 8.8* 8.6* 10.3* 9.2* 10.0*   PLATELETS 10*3/mm3 225 201 235 170 184               Imaging Results (Last 24 Hours)     ** No results found for the last 24 hours. **        amLODIPine, 5 mg, Oral, Q24H  aspirin, 81 mg, Oral, Daily  cefTRIAXone, 1 g, Intravenous, Q24H  escitalopram, 20 mg, Oral, Daily  hydrALAZINE, 25 mg, Oral, Q8H  insulin lispro, 0-7 Units, Subcutaneous, TID AC  levothyroxine, 175 mcg, Oral, Q AM  melatonin, 3 mg, Oral, Nightly  metoprolol tartrate, 25 mg, Oral, Q12H  pantoprazole, 40 mg, Oral, Daily  rOPINIRole, 0.75 mg, Oral, Nightly  sertraline, 100 mg, Oral, Daily  sodium bicarbonate, 650 mg, Oral, TID  sodium chloride, 10 mL, Intravenous, Q12H           Medication Review:   Current Facility-Administered Medications   Medication Dose Route Frequency Provider Last Rate Last Admin   • acetaminophen (TYLENOL) tablet 650 mg  650 mg Oral Q4H PRN Farida Laboy APRN       • amLODIPine (NORVASC) tablet 5 mg  5 mg Oral Q24H Frederic Powell MD   5 mg at 12/05/21 1005   • aspirin chewable tablet 81 mg  81 mg Oral Daily Farida Laboy APRN   81 mg at 12/05/21 1006   • cefTRIAXone (ROCEPHIN) 1 g in sodium chloride 0.9 % 100 mL IVPB-VTB  1 g Intravenous Q24H Fran Yo  mL/hr at 12/04/21 2130 1 g at 12/04/21 2130   • dextrose (D50W) (25 g/50 mL) IV injection 25 g  25 g Intravenous Q15 Min PRN Farida Laboy APRN       • dextrose (GLUTOSE) oral gel 15 g  15 g Oral Q15 Min PRN Narda,  RIKA Mays       • escitalopram (LEXAPRO) tablet 20 mg  20 mg Oral Daily Farida Laboy APRN   20 mg at 12/05/21 1005   • glucagon (human recombinant) (GLUCAGEN DIAGNOSTIC) injection 1 mg  1 mg Subcutaneous PRN Farida Laboy APRN       • hydrALAZINE (APRESOLINE) tablet 25 mg  25 mg Oral Q8H Frederic Powell MD   25 mg at 12/05/21 1411   • insulin lispro (ADMELOG) injection 0-7 Units  0-7 Units Subcutaneous TID AC Fran Yo MD   2 Units at 12/05/21 1137   • labetalol (NORMODYNE,TRANDATE) injection 10 mg  10 mg Intravenous Q6H PRN Farida Laboy APRN   10 mg at 12/01/21 1340   • levothyroxine (SYNTHROID, LEVOTHROID) tablet 175 mcg  175 mcg Oral Q AM Fran Yo MD   175 mcg at 12/05/21 0616   • loperamide (IMODIUM) capsule 2 mg  2 mg Oral 4x Daily PRN Fran Yo MD   2 mg at 12/03/21 1519   • melatonin tablet 3 mg  3 mg Oral Nightly Farida Laboy APRN   3 mg at 12/04/21 2130   • metoprolol tartrate (LOPRESSOR) tablet 25 mg  25 mg Oral Q12H Ania Agrawal MD   25 mg at 12/04/21 2131   • nitroglycerin (NITROSTAT) SL tablet 0.4 mg  0.4 mg Sublingual Q5 Min PRN Farida Laboy APRN       • pantoprazole (PROTONIX) EC tablet 40 mg  40 mg Oral Daily Farida Laboy APRN   40 mg at 12/05/21 1010   • prochlorperazine (COMPAZINE) injection 5 mg  5 mg Intravenous Q6H PRN Farida Laboy APRN       • rOPINIRole (REQUIP) tablet 0.75 mg  0.75 mg Oral Nightly Farida Laboy APRN   0.75 mg at 12/04/21 2129   • sertraline (ZOLOFT) tablet 100 mg  100 mg Oral Daily Farida Laboy APRN   100 mg at 12/05/21 1005   • sodium bicarbonate tablet 650 mg  650 mg Oral TID Frederic Powell MD   650 mg at 12/05/21 1006   • sodium chloride 0.9 % flush 10 mL  10 mL Intravenous Q12H Farida Laboy APRN   10 mL at 12/05/21 1008   • sodium chloride 0.9 % flush 10 mL  10 mL Intravenous PRN Farida Laboy APRN           Assessment/Plan    1.  MAYUR, CKD stage 4 - Cr down slightly 4.2 to 3.9 but has fluctuated 3.5 to 4.2 over stay and was notably 2.1 on 8/7/21 (vs 1.0 in 2019), overall picture more c/w progressive (albeit rapid) CKD (ie diabetic nephropathy) rather than MAYUR.  Sent off GN serologies but doubt will be revealing.  Has multiple uremic sx and eGFR likely worse than 12 mL/min predicted by MDRD based on scant muscle mass.  UOP also very poor ~ 500 cc/day.  Only minimal left hydronephrosis on CT so doubt significant enough to be playing any role.  K 5.1.  Phos 5.5  2.  Facial swelling and confusion, both improving.  Probably due to myxedema from severe hypothyroidism.  In light of extremely elevated TSH, ARB as culprit for facial swelling is much less likely  3.  DM2 with poor control  4.  Anemia, hgb up slightly 8.8   5.  Hypertension, watch for over-correction  6.  Type II non-NSTEMI  7.  Severe hypothyroidism.  This is probably driving her mild hyponatremia as well  8.  Medication noncompliance  9.  Diarrhea, resolved   10.NAGMA - HCo3 18 on nahoc3 tabs, will inc dose     Plan  - add renal restrictions to diet  - f/u GN serologies for completeness  - stopped IVF  - remove burch  - spoke at length to patient and family about need for dialysis initiation; she's poor candidate for urgent start PD right now due to visual impairment, compliance issues at home, son working, etc.    - consult vascular surg to place tunnel catheter and do vein map, TUES should suffice for this   - d/w RN and Dr Alva      MAYUR (acute kidney injury) (HCC)    HTN (hypertension)    Hypothyroidism    Type 2 diabetes mellitus with hyperglycemia, with long-term current use of insulin (HCC)    Rheumatoid arthritis (HCC)    Angioedema    Anemia    Medically noncompliant    Myocardial infarction due to demand ischemia (HCC)    Enteritis    PRES (posterior reversible encephalopathy syndrome)              Frederic Powell MD  12/05/21  14:41 EST

## 2021-12-05 NOTE — PLAN OF CARE
Goal Outcome Evaluation:  Plan of Care Reviewed With: patient        Progress: improving  Outcome Summary: VSS. Fall precautions in place - no falls tonight. IV Rocephin started. NS infusing - will stop at 6 am. Slept intermittently tonight. Will continue to monitor.

## 2021-12-06 ENCOUNTER — APPOINTMENT (OUTPATIENT)
Dept: GENERAL RADIOLOGY | Facility: HOSPITAL | Age: 56
End: 2021-12-06

## 2021-12-06 ENCOUNTER — ANESTHESIA EVENT (OUTPATIENT)
Dept: PERIOP | Facility: HOSPITAL | Age: 56
End: 2021-12-06

## 2021-12-06 ENCOUNTER — ANESTHESIA (OUTPATIENT)
Dept: PERIOP | Facility: HOSPITAL | Age: 56
End: 2021-12-06

## 2021-12-06 LAB
ALBUMIN SERPL-MCNC: 2.1 G/DL (ref 3.5–5.2)
ANION GAP SERPL CALCULATED.3IONS-SCNC: 13.5 MMOL/L (ref 5–15)
BASOPHILS # BLD AUTO: 0.05 10*3/MM3 (ref 0–0.2)
BASOPHILS NFR BLD AUTO: 0.4 % (ref 0–1.5)
BUN SERPL-MCNC: 49 MG/DL (ref 6–20)
BUN/CREAT SERPL: 12.6 (ref 7–25)
CALCIUM SPEC-SCNC: 7 MG/DL (ref 8.6–10.5)
CHLORIDE SERPL-SCNC: 102 MMOL/L (ref 98–107)
CO2 SERPL-SCNC: 17.5 MMOL/L (ref 22–29)
CREAT SERPL-MCNC: 3.89 MG/DL (ref 0.57–1)
DEPRECATED RDW RBC AUTO: 49.4 FL (ref 37–54)
EOSINOPHIL # BLD AUTO: 0.11 10*3/MM3 (ref 0–0.4)
EOSINOPHIL NFR BLD AUTO: 0.9 % (ref 0.3–6.2)
ERYTHROCYTE [DISTWIDTH] IN BLOOD BY AUTOMATED COUNT: 15.8 % (ref 12.3–15.4)
GFR SERPL CREATININE-BSD FRML MDRD: 12 ML/MIN/1.73
GFR SERPL CREATININE-BSD FRML MDRD: ABNORMAL ML/MIN/{1.73_M2}
GLUCOSE BLDC GLUCOMTR-MCNC: 105 MG/DL (ref 70–130)
GLUCOSE BLDC GLUCOMTR-MCNC: 117 MG/DL (ref 70–130)
GLUCOSE BLDC GLUCOMTR-MCNC: 141 MG/DL (ref 70–130)
GLUCOSE BLDC GLUCOMTR-MCNC: 284 MG/DL (ref 70–130)
GLUCOSE SERPL-MCNC: 133 MG/DL (ref 65–99)
HCT VFR BLD AUTO: 26.4 % (ref 34–46.6)
HGB BLD-MCNC: 8.7 G/DL (ref 12–15.9)
IMM GRANULOCYTES # BLD AUTO: 0.07 10*3/MM3 (ref 0–0.05)
IMM GRANULOCYTES NFR BLD AUTO: 0.5 % (ref 0–0.5)
LYMPHOCYTES # BLD AUTO: 2.15 10*3/MM3 (ref 0.7–3.1)
LYMPHOCYTES NFR BLD AUTO: 16.7 % (ref 19.6–45.3)
MCH RBC QN AUTO: 28.3 PG (ref 26.6–33)
MCHC RBC AUTO-ENTMCNC: 33 G/DL (ref 31.5–35.7)
MCV RBC AUTO: 86 FL (ref 79–97)
MONOCYTES # BLD AUTO: 0.88 10*3/MM3 (ref 0.1–0.9)
MONOCYTES NFR BLD AUTO: 6.8 % (ref 5–12)
NEUTROPHILS NFR BLD AUTO: 74.7 % (ref 42.7–76)
NEUTROPHILS NFR BLD AUTO: 9.59 10*3/MM3 (ref 1.7–7)
NRBC BLD AUTO-RTO: 0 /100 WBC (ref 0–0.2)
PHOSPHATE SERPL-MCNC: 5.1 MG/DL (ref 2.5–4.5)
PLATELET # BLD AUTO: 250 10*3/MM3 (ref 140–450)
PMV BLD AUTO: 11.9 FL (ref 6–12)
POTASSIUM SERPL-SCNC: 4.6 MMOL/L (ref 3.5–5.2)
RBC # BLD AUTO: 3.07 10*6/MM3 (ref 3.77–5.28)
SARS-COV-2 RNA PNL SPEC NAA+PROBE: NOT DETECTED
SODIUM SERPL-SCNC: 133 MMOL/L (ref 136–145)
WBC NRBC COR # BLD: 12.85 10*3/MM3 (ref 3.4–10.8)

## 2021-12-06 PROCEDURE — 77001 FLUOROGUIDE FOR VEIN DEVICE: CPT

## 2021-12-06 PROCEDURE — C1894 INTRO/SHEATH, NON-LASER: HCPCS | Performed by: STUDENT IN AN ORGANIZED HEALTH CARE EDUCATION/TRAINING PROGRAM

## 2021-12-06 PROCEDURE — 80069 RENAL FUNCTION PANEL: CPT | Performed by: INTERNAL MEDICINE

## 2021-12-06 PROCEDURE — 25010000002 CEFTRIAXONE PER 250 MG: Performed by: STUDENT IN AN ORGANIZED HEALTH CARE EDUCATION/TRAINING PROGRAM

## 2021-12-06 PROCEDURE — 82962 GLUCOSE BLOOD TEST: CPT

## 2021-12-06 PROCEDURE — 5A1D70Z PERFORMANCE OF URINARY FILTRATION, INTERMITTENT, LESS THAN 6 HOURS PER DAY: ICD-10-PCS | Performed by: STUDENT IN AN ORGANIZED HEALTH CARE EDUCATION/TRAINING PROGRAM

## 2021-12-06 PROCEDURE — 25010000002 HEPARIN (PORCINE) PER 1000 UNITS: Performed by: STUDENT IN AN ORGANIZED HEALTH CARE EDUCATION/TRAINING PROGRAM

## 2021-12-06 PROCEDURE — 0 LIDOCAINE 1 % SOLUTION 20 ML VIAL: Performed by: STUDENT IN AN ORGANIZED HEALTH CARE EDUCATION/TRAINING PROGRAM

## 2021-12-06 PROCEDURE — 25010000002 MIDAZOLAM PER 1 MG: Performed by: ANESTHESIOLOGY

## 2021-12-06 PROCEDURE — 25010000002 PROPOFOL 10 MG/ML EMULSION: Performed by: NURSE ANESTHETIST, CERTIFIED REGISTERED

## 2021-12-06 PROCEDURE — 85025 COMPLETE CBC W/AUTO DIFF WBC: CPT | Performed by: INTERNAL MEDICINE

## 2021-12-06 PROCEDURE — 25010000002 CEFAZOLIN PER 500 MG: Performed by: NURSE ANESTHETIST, CERTIFIED REGISTERED

## 2021-12-06 PROCEDURE — 87635 SARS-COV-2 COVID-19 AMP PRB: CPT | Performed by: SURGERY

## 2021-12-06 PROCEDURE — 99232 SBSQ HOSP IP/OBS MODERATE 35: CPT | Performed by: NURSE PRACTITIONER

## 2021-12-06 PROCEDURE — 0 CEFAZOLIN IN DEXTROSE 2-4 GM/100ML-% SOLUTION: Performed by: SURGERY

## 2021-12-06 PROCEDURE — 25010000002 FENTANYL CITRATE (PF) 50 MCG/ML SOLUTION: Performed by: NURSE ANESTHETIST, CERTIFIED REGISTERED

## 2021-12-06 PROCEDURE — C1750 CATH, HEMODIALYSIS,LONG-TERM: HCPCS | Performed by: STUDENT IN AN ORGANIZED HEALTH CARE EDUCATION/TRAINING PROGRAM

## 2021-12-06 RX ORDER — LIDOCAINE HYDROCHLORIDE 20 MG/ML
INJECTION, SOLUTION INFILTRATION; PERINEURAL AS NEEDED
Status: DISCONTINUED | OUTPATIENT
Start: 2021-12-06 | End: 2021-12-06 | Stop reason: SURG

## 2021-12-06 RX ORDER — HYDROMORPHONE HYDROCHLORIDE 1 MG/ML
0.5 INJECTION, SOLUTION INTRAMUSCULAR; INTRAVENOUS; SUBCUTANEOUS
Status: DISCONTINUED | OUTPATIENT
Start: 2021-12-06 | End: 2021-12-06 | Stop reason: HOSPADM

## 2021-12-06 RX ORDER — HYDROCODONE BITARTRATE AND ACETAMINOPHEN 5; 325 MG/1; MG/1
1 TABLET ORAL EVERY 6 HOURS PRN
Status: COMPLETED | OUTPATIENT
Start: 2021-12-06 | End: 2021-12-16

## 2021-12-06 RX ORDER — HYDRALAZINE HYDROCHLORIDE 20 MG/ML
5 INJECTION INTRAMUSCULAR; INTRAVENOUS
Status: DISCONTINUED | OUTPATIENT
Start: 2021-12-06 | End: 2021-12-06 | Stop reason: HOSPADM

## 2021-12-06 RX ORDER — FENTANYL CITRATE 50 UG/ML
INJECTION, SOLUTION INTRAMUSCULAR; INTRAVENOUS AS NEEDED
Status: DISCONTINUED | OUTPATIENT
Start: 2021-12-06 | End: 2021-12-06 | Stop reason: SURG

## 2021-12-06 RX ORDER — FOLIC ACID 1 MG/1
1 TABLET ORAL DAILY
Status: DISCONTINUED | OUTPATIENT
Start: 2021-12-06 | End: 2021-12-17 | Stop reason: HOSPADM

## 2021-12-06 RX ORDER — HEPARIN SODIUM 1000 [USP'U]/ML
INJECTION, SOLUTION INTRAVENOUS; SUBCUTANEOUS AS NEEDED
Status: DISCONTINUED | OUTPATIENT
Start: 2021-12-06 | End: 2021-12-06 | Stop reason: HOSPADM

## 2021-12-06 RX ORDER — SODIUM CHLORIDE 9 MG/ML
9 INJECTION, SOLUTION INTRAVENOUS CONTINUOUS PRN
Status: DISCONTINUED | OUTPATIENT
Start: 2021-12-06 | End: 2021-12-17 | Stop reason: HOSPADM

## 2021-12-06 RX ORDER — EPHEDRINE SULFATE 50 MG/ML
5 INJECTION, SOLUTION INTRAVENOUS ONCE AS NEEDED
Status: DISCONTINUED | OUTPATIENT
Start: 2021-12-06 | End: 2021-12-06 | Stop reason: HOSPADM

## 2021-12-06 RX ORDER — FLUMAZENIL 0.1 MG/ML
0.2 INJECTION INTRAVENOUS AS NEEDED
Status: DISCONTINUED | OUTPATIENT
Start: 2021-12-06 | End: 2021-12-06 | Stop reason: HOSPADM

## 2021-12-06 RX ORDER — SODIUM CHLORIDE 0.9 % (FLUSH) 0.9 %
3-10 SYRINGE (ML) INJECTION AS NEEDED
Status: DISCONTINUED | OUTPATIENT
Start: 2021-12-06 | End: 2021-12-06 | Stop reason: HOSPADM

## 2021-12-06 RX ORDER — FENTANYL CITRATE 50 UG/ML
50 INJECTION, SOLUTION INTRAMUSCULAR; INTRAVENOUS
Status: DISCONTINUED | OUTPATIENT
Start: 2021-12-06 | End: 2021-12-06 | Stop reason: HOSPADM

## 2021-12-06 RX ORDER — SODIUM CHLORIDE 9 MG/ML
INJECTION, SOLUTION INTRAVENOUS CONTINUOUS PRN
Status: DISCONTINUED | OUTPATIENT
Start: 2021-12-06 | End: 2021-12-06 | Stop reason: SURG

## 2021-12-06 RX ORDER — SODIUM CHLORIDE 0.9 % (FLUSH) 0.9 %
3 SYRINGE (ML) INJECTION EVERY 12 HOURS SCHEDULED
Status: DISCONTINUED | OUTPATIENT
Start: 2021-12-06 | End: 2021-12-06 | Stop reason: HOSPADM

## 2021-12-06 RX ORDER — HYDROCODONE BITARTRATE AND ACETAMINOPHEN 7.5; 325 MG/1; MG/1
1 TABLET ORAL ONCE AS NEEDED
Status: DISCONTINUED | OUTPATIENT
Start: 2021-12-06 | End: 2021-12-06 | Stop reason: HOSPADM

## 2021-12-06 RX ORDER — DIPHENHYDRAMINE HCL 25 MG
25 CAPSULE ORAL
Status: DISCONTINUED | OUTPATIENT
Start: 2021-12-06 | End: 2021-12-06 | Stop reason: HOSPADM

## 2021-12-06 RX ORDER — PROMETHAZINE HYDROCHLORIDE 25 MG/1
25 SUPPOSITORY RECTAL ONCE AS NEEDED
Status: DISCONTINUED | OUTPATIENT
Start: 2021-12-06 | End: 2021-12-06 | Stop reason: HOSPADM

## 2021-12-06 RX ORDER — ONDANSETRON 2 MG/ML
4 INJECTION INTRAMUSCULAR; INTRAVENOUS ONCE AS NEEDED
Status: DISCONTINUED | OUTPATIENT
Start: 2021-12-06 | End: 2021-12-06 | Stop reason: HOSPADM

## 2021-12-06 RX ORDER — LABETALOL HYDROCHLORIDE 5 MG/ML
5 INJECTION, SOLUTION INTRAVENOUS
Status: DISCONTINUED | OUTPATIENT
Start: 2021-12-06 | End: 2021-12-06 | Stop reason: HOSPADM

## 2021-12-06 RX ORDER — DIPHENHYDRAMINE HYDROCHLORIDE 50 MG/ML
12.5 INJECTION INTRAMUSCULAR; INTRAVENOUS
Status: DISCONTINUED | OUTPATIENT
Start: 2021-12-06 | End: 2021-12-06 | Stop reason: HOSPADM

## 2021-12-06 RX ORDER — NALOXONE HCL 0.4 MG/ML
0.2 VIAL (ML) INJECTION AS NEEDED
Status: DISCONTINUED | OUTPATIENT
Start: 2021-12-06 | End: 2021-12-06 | Stop reason: HOSPADM

## 2021-12-06 RX ORDER — PROPOFOL 10 MG/ML
VIAL (ML) INTRAVENOUS AS NEEDED
Status: DISCONTINUED | OUTPATIENT
Start: 2021-12-06 | End: 2021-12-06 | Stop reason: SURG

## 2021-12-06 RX ORDER — OXYCODONE AND ACETAMINOPHEN 7.5; 325 MG/1; MG/1
1 TABLET ORAL EVERY 4 HOURS PRN
Status: DISCONTINUED | OUTPATIENT
Start: 2021-12-06 | End: 2021-12-06 | Stop reason: HOSPADM

## 2021-12-06 RX ORDER — LIDOCAINE HYDROCHLORIDE 10 MG/ML
0.5 INJECTION, SOLUTION EPIDURAL; INFILTRATION; INTRACAUDAL; PERINEURAL ONCE AS NEEDED
Status: DISCONTINUED | OUTPATIENT
Start: 2021-12-06 | End: 2021-12-06 | Stop reason: HOSPADM

## 2021-12-06 RX ORDER — PROMETHAZINE HYDROCHLORIDE 25 MG/1
25 TABLET ORAL ONCE AS NEEDED
Status: DISCONTINUED | OUTPATIENT
Start: 2021-12-06 | End: 2021-12-06 | Stop reason: HOSPADM

## 2021-12-06 RX ORDER — IBUPROFEN 600 MG/1
600 TABLET ORAL ONCE AS NEEDED
Status: DISCONTINUED | OUTPATIENT
Start: 2021-12-06 | End: 2021-12-06 | Stop reason: HOSPADM

## 2021-12-06 RX ORDER — CEFAZOLIN SODIUM 500 MG/2.2ML
INJECTION, POWDER, FOR SOLUTION INTRAMUSCULAR; INTRAVENOUS AS NEEDED
Status: DISCONTINUED | OUTPATIENT
Start: 2021-12-06 | End: 2021-12-06 | Stop reason: SURG

## 2021-12-06 RX ORDER — GLYCOPYRROLATE 0.2 MG/ML
INJECTION INTRAMUSCULAR; INTRAVENOUS AS NEEDED
Status: DISCONTINUED | OUTPATIENT
Start: 2021-12-06 | End: 2021-12-06 | Stop reason: SURG

## 2021-12-06 RX ORDER — CEFAZOLIN SODIUM 2 G/100ML
2 INJECTION, SOLUTION INTRAVENOUS ONCE
Status: COMPLETED | OUTPATIENT
Start: 2021-12-06 | End: 2021-12-06

## 2021-12-06 RX ORDER — EPHEDRINE SULFATE 50 MG/ML
INJECTION, SOLUTION INTRAVENOUS AS NEEDED
Status: DISCONTINUED | OUTPATIENT
Start: 2021-12-06 | End: 2021-12-06 | Stop reason: SURG

## 2021-12-06 RX ORDER — MIDAZOLAM HYDROCHLORIDE 1 MG/ML
1 INJECTION INTRAMUSCULAR; INTRAVENOUS
Status: DISCONTINUED | OUTPATIENT
Start: 2021-12-06 | End: 2021-12-06 | Stop reason: HOSPADM

## 2021-12-06 RX ADMIN — LINAGLIPTIN 5 MG: 5 TABLET, FILM COATED ORAL at 21:30

## 2021-12-06 RX ADMIN — HYDRALAZINE HYDROCHLORIDE 25 MG: 50 TABLET, FILM COATED ORAL at 05:08

## 2021-12-06 RX ADMIN — CEFAZOLIN 2 G: 225 INJECTION, POWDER, FOR SOLUTION INTRAMUSCULAR; INTRAVENOUS at 13:27

## 2021-12-06 RX ADMIN — LEVOTHYROXINE SODIUM 175 MCG: 0.17 TABLET ORAL at 05:08

## 2021-12-06 RX ADMIN — HYDRALAZINE HYDROCHLORIDE 25 MG: 50 TABLET, FILM COATED ORAL at 17:50

## 2021-12-06 RX ADMIN — METOPROLOL TARTRATE 25 MG: 25 TABLET, FILM COATED ORAL at 21:30

## 2021-12-06 RX ADMIN — HYDRALAZINE HYDROCHLORIDE 25 MG: 50 TABLET, FILM COATED ORAL at 21:30

## 2021-12-06 RX ADMIN — CEFAZOLIN SODIUM 2 G: 2 INJECTION, SOLUTION INTRAVENOUS at 12:55

## 2021-12-06 RX ADMIN — EPHEDRINE SULFATE 10 MG: 50 INJECTION INTRAVENOUS at 13:25

## 2021-12-06 RX ADMIN — SODIUM BICARBONATE 1300 MG: 650 TABLET ORAL at 08:37

## 2021-12-06 RX ADMIN — PANTOPRAZOLE SODIUM 40 MG: 40 TABLET, DELAYED RELEASE ORAL at 08:38

## 2021-12-06 RX ADMIN — ASPIRIN 81 MG: 81 TABLET, CHEWABLE ORAL at 17:50

## 2021-12-06 RX ADMIN — PROPOFOL 130 MG: 10 INJECTION, EMULSION INTRAVENOUS at 13:17

## 2021-12-06 RX ADMIN — EPHEDRINE SULFATE 10 MG: 50 INJECTION INTRAVENOUS at 13:40

## 2021-12-06 RX ADMIN — SODIUM CHLORIDE, PRESERVATIVE FREE 10 ML: 5 INJECTION INTRAVENOUS at 08:38

## 2021-12-06 RX ADMIN — SODIUM CHLORIDE 9 ML/HR: 9 INJECTION, SOLUTION INTRAVENOUS at 12:10

## 2021-12-06 RX ADMIN — ROPINIROLE HYDROCHLORIDE 0.75 MG: 0.5 TABLET, FILM COATED ORAL at 21:29

## 2021-12-06 RX ADMIN — SODIUM CHLORIDE, PRESERVATIVE FREE 10 ML: 5 INJECTION INTRAVENOUS at 21:30

## 2021-12-06 RX ADMIN — SODIUM CHLORIDE: 9 INJECTION, SOLUTION INTRAVENOUS at 12:28

## 2021-12-06 RX ADMIN — Medication 3 MG: at 21:30

## 2021-12-06 RX ADMIN — FENTANYL CITRATE 50 MCG: 0.05 INJECTION, SOLUTION INTRAMUSCULAR; INTRAVENOUS at 13:17

## 2021-12-06 RX ADMIN — LIDOCAINE HYDROCHLORIDE 80 MG: 20 INJECTION, SOLUTION INFILTRATION; PERINEURAL at 13:17

## 2021-12-06 RX ADMIN — ESCITALOPRAM 20 MG: 20 TABLET, FILM COATED ORAL at 08:37

## 2021-12-06 RX ADMIN — SODIUM BICARBONATE 1300 MG: 650 TABLET ORAL at 21:30

## 2021-12-06 RX ADMIN — EPHEDRINE SULFATE 20 MG: 50 INJECTION INTRAVENOUS at 13:43

## 2021-12-06 RX ADMIN — ACETAMINOPHEN 650 MG: 325 TABLET, FILM COATED ORAL at 08:37

## 2021-12-06 RX ADMIN — AMLODIPINE BESYLATE 5 MG: 5 TABLET ORAL at 08:37

## 2021-12-06 RX ADMIN — CEFTRIAXONE 1 G: 1 INJECTION, POWDER, FOR SOLUTION INTRAMUSCULAR; INTRAVENOUS at 21:30

## 2021-12-06 RX ADMIN — MIDAZOLAM 1 MG: 1 INJECTION INTRAMUSCULAR; INTRAVENOUS at 12:10

## 2021-12-06 RX ADMIN — SERTRALINE 100 MG: 100 TABLET, FILM COATED ORAL at 08:37

## 2021-12-06 RX ADMIN — METOPROLOL TARTRATE 25 MG: 25 TABLET, FILM COATED ORAL at 08:37

## 2021-12-06 RX ADMIN — GLYCOPYRROLATE 0.3 MG: 0.2 INJECTION INTRAMUSCULAR; INTRAVENOUS at 13:22

## 2021-12-06 RX ADMIN — FOLIC ACID 1 MG: 1 TABLET ORAL at 21:30

## 2021-12-06 NOTE — NURSING NOTE
At approximately 0105 this morning pt bed alarm was sounding and pt was found sitting on the floor beside her bed. Per pt she had spilled some water and was trying to clean it up. Pt denies injury or pain. No visible signs of injury. NP notified. Family to be notified in the morning per pt request.

## 2021-12-06 NOTE — PROGRESS NOTES
"   LOS: 7 days    Patient Care Team:  Provider, No Known as PCP - General    Chief Complaint:    Chief Complaint   Patient presents with   • Altered Mental Status     Follow UP MAYUR CKD4  Subjective     Interval History:   She feels slightly better today.  Denies any fever no chills.  Continues to be fatigued.  Awake  Going for a tunneled line placement today for initiation of dialysis.  Oral intake is poor  Urine output around 700 cc last 24 hours  Objective     Vital Signs  Temp:  [98.3 °F (36.8 °C)-98.8 °F (37.1 °C)] 98.3 °F (36.8 °C)  Heart Rate:  [58-93] 59  Resp:  [16-18] 18  BP: (137-172)/() 163/90    Flowsheet Rows      First Filed Value   Admission Height 157.5 cm (62\") Documented at 11/29/2021 2011   Admission Weight 59 kg (130 lb) Documented at 11/30/2021 0200          No intake/output data recorded.  I/O last 3 completed shifts:  In: 1045 [P.O.:120; I.V.:825; IV Piggyback:100]  Out: 1175 [Urine:1175]    Intake/Output Summary (Last 24 hours) at 12/6/2021 1025  Last data filed at 12/6/2021 0936  Gross per 24 hour   Intake 120 ml   Output 700 ml   Net -580 ml       Physical Exam:  General Appearance: more alert today, no distress, +facial edema  Neck supple no JVD  Lungs CTA bilat no rales  CV RRR no m/g  abd soft NT/ND  vasc no pedal/ankle edema 2+ radial pulses                      Results Review:    Results from last 7 days   Lab Units 12/06/21  0343 12/05/21  0414 12/04/21  0604 11/30/21  0153 11/29/21  2121   SODIUM mmol/L 133* 135* 134*   < > 132*   POTASSIUM mmol/L 4.6 5.1 4.8   < > 5.0   CHLORIDE mmol/L 102 107 103   < > 97*   CO2 mmol/L 17.5* 18.0* 19.7*   < > 24.1   BUN mg/dL 49* 44* 41*   < > 40*   CREATININE mg/dL 3.89* 3.95* 4.21*   < > 4.22*   CALCIUM mg/dL 7.0* 7.2* 6.8*   < > 7.9*   BILIRUBIN mg/dL  --   --   --   --  0.4   ALK PHOS U/L  --   --   --   --  88   ALT (SGPT) U/L  --   --   --   --  12   AST (SGOT) U/L  --   --   --   --  23   GLUCOSE mg/dL 133* 143* 109*   < > 292*    < > " = values in this interval not displayed.       Estimated Creatinine Clearance: 13.9 mL/min (A) (by C-G formula based on SCr of 3.89 mg/dL (H)).    Results from last 7 days   Lab Units 12/06/21 0343 12/05/21 0414 12/04/21  0604 12/03/21  1018 12/02/21  1147   MAGNESIUM mg/dL  --   --  1.4*  --  1.5*   PHOSPHORUS mg/dL 5.1* 5.5* 5.4*   < > 5.6*    < > = values in this interval not displayed.             Results from last 7 days   Lab Units 12/06/21  0343 12/05/21 0414 12/04/21  0604 12/03/21  1018 12/02/21  1147   WBC 10*3/mm3 12.85* 15.74* 12.86* 13.71* 10.56   HEMOGLOBIN g/dL 8.7* 8.8* 8.6* 10.3* 9.2*   PLATELETS 10*3/mm3 250 225 201 235 170               Imaging Results (Last 24 Hours)     ** No results found for the last 24 hours. **        amLODIPine, 5 mg, Oral, Q24H  aspirin, 81 mg, Oral, Daily  cefTRIAXone, 1 g, Intravenous, Q24H  escitalopram, 20 mg, Oral, Daily  hydrALAZINE, 25 mg, Oral, Q8H  insulin lispro, 0-7 Units, Subcutaneous, TID AC  levothyroxine, 175 mcg, Oral, Q AM  melatonin, 3 mg, Oral, Nightly  metoprolol tartrate, 25 mg, Oral, Q12H  pantoprazole, 40 mg, Oral, Daily  rOPINIRole, 0.75 mg, Oral, Nightly  sertraline, 100 mg, Oral, Daily  sodium bicarbonate, 1,300 mg, Oral, TID  sodium chloride, 10 mL, Intravenous, Q12H           Medication Review:   Current Facility-Administered Medications   Medication Dose Route Frequency Provider Last Rate Last Admin   • acetaminophen (TYLENOL) tablet 650 mg  650 mg Oral Q4H PRN Farida Laboy APRN   650 mg at 12/06/21 0837   • amLODIPine (NORVASC) tablet 5 mg  5 mg Oral Q24H Frederic Powell MD   5 mg at 12/06/21 0837   • aspirin chewable tablet 81 mg  81 mg Oral Daily Farida Laboy APRN   81 mg at 12/05/21 1006   • cefTRIAXone (ROCEPHIN) 1 g in sodium chloride 0.9 % 100 mL IVPB-VTB  1 g Intravenous Q24H Fran Yo  mL/hr at 12/05/21 2129 1 g at 12/05/21 2129   • dextrose (D50W) (25 g/50 mL) IV injection 25 g  25 g  Intravenous Q15 Min PRN Farida Laboy APRN       • dextrose (GLUTOSE) oral gel 15 g  15 g Oral Q15 Min PRN Farida Laboy APRN       • escitalopram (LEXAPRO) tablet 20 mg  20 mg Oral Daily Farida Laboy APRN   20 mg at 12/06/21 0837   • glucagon (human recombinant) (GLUCAGEN DIAGNOSTIC) injection 1 mg  1 mg Subcutaneous PRN Farida Laboy APRN       • hydrALAZINE (APRESOLINE) tablet 25 mg  25 mg Oral Q8H Frederic Powell MD   25 mg at 12/06/21 0508   • insulin lispro (ADMELOG) injection 0-7 Units  0-7 Units Subcutaneous TID AC Fran Yo MD   3 Units at 12/05/21 1719   • labetalol (NORMODYNE,TRANDATE) injection 10 mg  10 mg Intravenous Q6H PRN Farida Laboy APRN   10 mg at 12/01/21 1340   • levothyroxine (SYNTHROID, LEVOTHROID) tablet 175 mcg  175 mcg Oral Q AM Fran Yo MD   175 mcg at 12/06/21 0508   • loperamide (IMODIUM) capsule 2 mg  2 mg Oral 4x Daily PRN Fran Yo MD   2 mg at 12/03/21 1519   • melatonin tablet 3 mg  3 mg Oral Nightly Farida Laboy APRN   3 mg at 12/05/21 2129   • metoprolol tartrate (LOPRESSOR) tablet 25 mg  25 mg Oral Q12H Ania Agrawal MD   25 mg at 12/06/21 0837   • nitroglycerin (NITROSTAT) SL tablet 0.4 mg  0.4 mg Sublingual Q5 Min PRN Farida Laboy APRN       • pantoprazole (PROTONIX) EC tablet 40 mg  40 mg Oral Daily Farida Laboy APRN   40 mg at 12/05/21 1010   • prochlorperazine (COMPAZINE) injection 5 mg  5 mg Intravenous Q6H PRN Farida Laboy APRN       • rOPINIRole (REQUIP) tablet 0.75 mg  0.75 mg Oral Nightly Farida Laboy APRN   0.75 mg at 12/05/21 2129   • sertraline (ZOLOFT) tablet 100 mg  100 mg Oral Daily Farida Laboy APRN   100 mg at 12/06/21 0837   • sodium bicarbonate tablet 1,300 mg  1,300 mg Oral TID Frederic Powell MD   1,300 mg at 12/06/21 0837   • sodium chloride 0.9 % flush 10 mL  10 mL Intravenous Q12H Farida Laboy APRN   10 mL  at 12/06/21 0838   • sodium chloride 0.9 % flush 10 mL  10 mL Intravenous PRN Farida Laboy, RIKA           Assessment/Plan   1.  MAYUR, CKD stage 4 - Cr down slightly 4.2 to 3.9 but has fluctuated 3.5 to 4.2 over stay and was notably 2.1 on 8/7/21 (vs 1.0 in 2019), overall picture more c/w progressive (albeit rapid) CKD (ie diabetic nephropathy) rather than MAYUR.  Sent off GN serologies but doubt will be revealing.  Has multiple uremic sx and eGFR likely worse than 12 mL/min predicted by MDRD based on scant muscle mass.  Only minimal left hydronephrosis on CT so doubt significant enough to be playing any role.  K 5.1.  Phos 5.5  2.  Facial swelling and confusion, both improving.  Probably due to myxedema from severe hypothyroidism.  In light of extremely elevated TSH, ARB as culprit for facial swelling is much less likely  3.  DM2 with poor control  4.  Anemia, hgb up slightly 8.7   5.  Hypertension, watch for over-correction  6.  Type II non-NSTEMI  7.  Severe hypothyroidism.  This is probably driving her mild hyponatremia as well  8.  Medication noncompliance  9.  Diarrhea, resolved   10.NAGMA - HCo3 18 on nahoc3 tabs, will inc dose     Plan    Plan to place tunneled dialysis catheter today and initiate dialysis as soon as catheter is placed.  Discussed with nursing staff      MAYUR (acute kidney injury) (HCC)    HTN (hypertension)    Hypothyroidism    Type 2 diabetes mellitus with hyperglycemia, with long-term current use of insulin (HCC)    Rheumatoid arthritis (HCC)    Angioedema    Anemia    Medically noncompliant    Myocardial infarction due to demand ischemia (HCC)    Enteritis    PRES (posterior reversible encephalopathy syndrome)              Reynaldo Sotelo MD  12/06/21  10:24 EST

## 2021-12-06 NOTE — PLAN OF CARE
Problem: Adult Inpatient Plan of Care  Goal: Plan of Care Review  Outcome: Ongoing, Progressing  Flowsheets (Taken 12/6/2021 0520)  Progress: no change  Plan of Care Reviewed With: patient  Outcome Summary: Pt vitals stable. Npo at midnight. No c/o pain. Possible tunnel cath placement today. Pt falls risk zone 2. Will continue to monitor   Goal Outcome Evaluation:  Plan of Care Reviewed With: patient        Progress: no change  Outcome Summary: Pt vitals stable. Npo at midnight. No c/o pain. Possible tunnel cath placement today. Pt falls risk zone 2. Will continue to monitor

## 2021-12-06 NOTE — ANESTHESIA PREPROCEDURE EVALUATION
Anesthesia Evaluation     Patient summary reviewed and Nursing notes reviewed                Airway   Mallampati: II  TM distance: >3 FB  Dental      Pulmonary - negative pulmonary ROS   Cardiovascular     ECG reviewed  Rhythm: regular  Rate: normal    (+) hypertension, past MI ,       Neuro/Psych- negative ROS  GI/Hepatic/Renal/Endo    (+)  GERD,  renal disease dialysis and ESRD, diabetes mellitus type 2 using insulin,     Musculoskeletal (-) negative ROS    Abdominal    Substance History - negative use     OB/GYN negative ob/gyn ROS         Other                      Anesthesia Plan    ASA 4     MAC   (I have reviewed the patient's history with the patient and the chart, including all pertinent laboratory results and imaging. I have explained the risks of anesthesia including but not limited to dental damage, corneal abrasion, nerve injury, MI, stroke, and death. Questions asked and answered. Anesthetic plan discussed with patient and team as indicated. Patient expressed understanding of the above.  Troponin elevated on admission probably due to kidney failure and not an acute MI per say  )  intravenous induction     Anesthetic plan, all risks, benefits, and alternatives have been provided, discussed and informed consent has been obtained with: patient.

## 2021-12-06 NOTE — PROGRESS NOTES
Gastroenterology   Inpatient Progress Note    Reason for Follow Up:  Abdominal pain, diarrhea, enteritis    Subjective  Interval History:   Patient denies nausea, vomiting, pain.    She is currently n.p.o. for catheter placement today.    She had 2 loose bowel movements this morning.  She did not have a bowel movement yesterday.  There was no melena or bright red blood per rectum.      Current Facility-Administered Medications:   •  acetaminophen (TYLENOL) tablet 650 mg, 650 mg, Oral, Q4H PRN, 650 mg at 12/06/21 0837 **OR** [DISCONTINUED] acetaminophen (TYLENOL) 160 MG/5ML solution 650 mg, 650 mg, Oral, Q4H PRN **OR** [DISCONTINUED] acetaminophen (TYLENOL) suppository 650 mg, 650 mg, Rectal, Q4H PRN, Farida Laboy APRN  •  amLODIPine (NORVASC) tablet 5 mg, 5 mg, Oral, Q24H, Frederic Powell MD, 5 mg at 12/06/21 0837  •  aspirin chewable tablet 81 mg, 81 mg, Oral, Daily, Farida Laboy APRN, 81 mg at 12/05/21 1006  •  cefTRIAXone (ROCEPHIN) 1 g in sodium chloride 0.9 % 100 mL IVPB-VTB, 1 g, Intravenous, Q24H, Fran Yo MD, Last Rate: 200 mL/hr at 12/05/21 2129, 1 g at 12/05/21 2129  •  dextrose (D50W) (25 g/50 mL) IV injection 25 g, 25 g, Intravenous, Q15 Min PRN, Farida Laboy APRN  •  dextrose (GLUTOSE) oral gel 15 g, 15 g, Oral, Q15 Min PRN, Farida Laboy APRN  •  escitalopram (LEXAPRO) tablet 20 mg, 20 mg, Oral, Daily, Farida Laboy APRN, 20 mg at 12/06/21 0837  •  glucagon (human recombinant) (GLUCAGEN DIAGNOSTIC) injection 1 mg, 1 mg, Subcutaneous, PRN, Farida Laboy APRN  •  hydrALAZINE (APRESOLINE) tablet 25 mg, 25 mg, Oral, Q8H, Frederic Powell MD, 25 mg at 12/06/21 0508  •  insulin lispro (ADMELOG) injection 0-7 Units, 0-7 Units, Subcutaneous, TID AC, Fran Yo MD, 3 Units at 12/05/21 1719  •  labetalol (NORMODYNE,TRANDATE) injection 10 mg, 10 mg, Intravenous, Q6H PRN, Farida Laboy, APRN, 10 mg at 12/01/21 1340  •   levothyroxine (SYNTHROID, LEVOTHROID) tablet 175 mcg, 175 mcg, Oral, Q AM, Fran Yo MD, 175 mcg at 12/06/21 0508  •  loperamide (IMODIUM) capsule 2 mg, 2 mg, Oral, 4x Daily PRN, Fran Yo MD, 2 mg at 12/03/21 1519  •  melatonin tablet 3 mg, 3 mg, Oral, Nightly, Farida Laboy APRN, 3 mg at 12/05/21 2129  •  metoprolol tartrate (LOPRESSOR) tablet 25 mg, 25 mg, Oral, Q12H, Ania Agrawal MD, 25 mg at 12/06/21 0837  •  nitroglycerin (NITROSTAT) SL tablet 0.4 mg, 0.4 mg, Sublingual, Q5 Min PRN, Farida Laboy APRN  •  pantoprazole (PROTONIX) EC tablet 40 mg, 40 mg, Oral, Daily, Farida Laboy APRN, 40 mg at 12/05/21 1010  •  prochlorperazine (COMPAZINE) injection 5 mg, 5 mg, Intravenous, Q6H PRN, Farida Laboy APRN  •  rOPINIRole (REQUIP) tablet 0.75 mg, 0.75 mg, Oral, Nightly, Farida Laboy APRN, 0.75 mg at 12/05/21 2129  •  sertraline (ZOLOFT) tablet 100 mg, 100 mg, Oral, Daily, Farida Laboy APRN, 100 mg at 12/06/21 0837  •  sodium bicarbonate tablet 1,300 mg, 1,300 mg, Oral, TID, Frederic Powell MD, 1,300 mg at 12/06/21 0837  •  sodium chloride 0.9 % flush 10 mL, 10 mL, Intravenous, Q12H, Farida Laboy APRN, 10 mL at 12/06/21 0838  •  sodium chloride 0.9 % flush 10 mL, 10 mL, Intravenous, PRN, Farida Laboy APRN  Review of Systems:               The following systems were reviewed and negative;  gastrointestinal    Objective     Vital Signs  Temp:  [98.3 °F (36.8 °C)-98.8 °F (37.1 °C)] 98.3 °F (36.8 °C)  Heart Rate:  [58-93] 59  Resp:  [16-18] 18  BP: (137-172)/() 163/90  Body mass index is 24.69 kg/m².                  Physical Exam:              General: patient awake, alert and cooperative              Eyes: no scleral icterus              Skin: warm and dry, not jaundiced              Abdomen: soft, nontender, nondistended; normal bowel sounds              Psychiatric: Appropriate affect and behavior                 Results Review:                I reviewed the patient's new clinical results.    Results from last 7 days   Lab Units 12/06/21  0343 12/05/21  0414 12/04/21  0604   WBC 10*3/mm3 12.85* 15.74* 12.86*   HEMOGLOBIN g/dL 8.7* 8.8* 8.6*   HEMATOCRIT % 26.4* 26.9* 25.4*   PLATELETS 10*3/mm3 250 225 201     Results from last 7 days   Lab Units 12/06/21  0343 12/05/21  0414 12/04/21  0604 11/30/21  0153 11/29/21  2121   SODIUM mmol/L 133* 135* 134*   < > 132*   POTASSIUM mmol/L 4.6 5.1 4.8   < > 5.0   CHLORIDE mmol/L 102 107 103   < > 97*   CO2 mmol/L 17.5* 18.0* 19.7*   < > 24.1   BUN mg/dL 49* 44* 41*   < > 40*   CREATININE mg/dL 3.89* 3.95* 4.21*   < > 4.22*   CALCIUM mg/dL 7.0* 7.2* 6.8*   < > 7.9*   BILIRUBIN mg/dL  --   --   --   --  0.4   ALK PHOS U/L  --   --   --   --  88   ALT (SGPT) U/L  --   --   --   --  12   AST (SGOT) U/L  --   --   --   --  23   GLUCOSE mg/dL 133* 143* 109*   < > 292*    < > = values in this interval not displayed.         No results found for: LIPASE    Radiology:  MRI Brain With & Without Contrast   Final Result   The study was hampered by patient motion. No evidence of   acute infarction. Areas of T2 FLAIR hyperintensity involving the   periventricular and to a lesser extent deep white matter of the cerebral   hemispheres bilaterally. Small foci of increased signal intensity   involving the white matter of the left cerebellar hemisphere was also   appreciated. There was no evidence of abnormal enhancement. The   appearance is nonspecific. The areas of T2 FLAIR hyperintensity   supratentorially may represent small vessel ischemic disease. The   cerebellar areas of T2 FLAIR hyperintensity are nonspecific. This may   relate to PRES. Clinical correlation and a follow-up MRI examination the   brain with and without contrast is suggested. There is no evidence of   abnormal enhancement or hemosiderosis.       This report was finalized on 12/3/2021 11:00 AM by Dr. Osmar Nelson M.D.           CT Abdomen Pelvis Without Contrast   Final Result   1. Minimal left hydronephrosis without obvious obstruction. Bladder wall   is diffusely thickened, which may be the cause of the minimal   hydronephrosis   2. Multiple thickened small bowel loops within the mid and left abdomen   suggesting enteritis. No evidence for bowel obstruction   3. Edema within the mesentery and very minimal ascites. Diffuse   anasarca.   4. Cholelithiasis       Radiation dose reduction techniques were utilized, including automated   exposure control and exposure modulation based on body size.       This report was finalized on 12/2/2021 8:54 AM by Dr. Bijan Ordoñez M.D.          US Renal Bilateral   Final Result      XR Chest 1 View   Final Result   No acute findings.       This report was finalized on 11/29/2021 9:55 PM by Dr. Cherri Rodriguez M.D.              Assessment/Plan     Patient Active Problem List   Diagnosis   • MAYUR (acute kidney injury) (HCC)   • HTN (hypertension)   • Hypothyroidism   • Type 2 diabetes mellitus with hyperglycemia, with long-term current use of insulin (HCC)   • Rheumatoid arthritis (HCC)   • Angioedema   • GERD (gastroesophageal reflux disease)   • Anemia   • Medically noncompliant   • Myocardial infarction due to demand ischemia (HCC)   • Enteritis   • PRES (posterior reversible encephalopathy syndrome)       Assessment:  1. Diarrhea, acute onset, resolved  2. Abdominal pain, resolved  3. Abnormal CT scan with enteritis  4. Acute kidney injury  5. Anemia    These problems are new to me  Plan:  · GI symptoms diarrhea and abdominal pain have resolved  · Hemoglobin stable  · GI will sign off at this time but can be called at any time if any change in clinical course or recurrence of symptoms      I discussed the patients findings and my recommendations with patient.           Daija MELÉNDEZ  [facility] [address] [address]  Office:  [phone]

## 2021-12-06 NOTE — ANESTHESIA POSTPROCEDURE EVALUATION
Patient: Zaina Martinez    Procedure Summary     Date: 12/06/21 Room / Location:  [facility] OR [facility]    Anesthesia Start: 1303 Anesthesia Stop: 1402    Procedure: HEMODIALYSIS CATHETER INSERTION (N/A ) Diagnosis:       Acute renal failure, unspecified acute renal failure type (HCC)      (Acute renal failure, unspecified acute renal failure type (HCC) [N17.9])    Surgeons: Keli Salazar MD Provider: Nicho Hi MD    Anesthesia Type: MAC ASA Status: 4          Anesthesia Type: MAC    Vitals  Vitals Value Taken Time   /117 12/06/21 1431   Temp 36.8 °C (98.2 °F) 12/06/21 1400   Pulse 59 12/06/21 1439   Resp 16 12/06/21 1415   SpO2 94 % 12/06/21 1439   Vitals shown include unvalidated device data.        Post Anesthesia Care and Evaluation    Patient location during evaluation: bedside  Patient participation: complete - patient participated  Level of consciousness: awake  Pain management: adequate  Airway patency: patent  Anesthetic complications: No anesthetic complications    Cardiovascular status: acceptable  Respiratory status: acceptable  Hydration status: acceptable

## 2021-12-06 NOTE — PROGRESS NOTES
[facility] Medicine Services  PROGRESS NOTE    Patient Name: Zaina Martinez  : 1965  MRN: [id number]    Date of Admission: 2021  Primary Care Physician: Provider, No Known    Subjective   Subjective     CC:  Follow-up renal failure.    HPI:  Patient is agreement getting catheter and initiating dialysis.  She is a poor historian.  Nursing report patient was getting up some last night.  Patient reportedly climbed out of bed to clean spilled water last night.  Patient denies any kind of injury or pain or head trauma.  Patient reports she feels somewhat tired generally.  No other new complaints.    Review of Systems  No current fevers or chills  No current shortness of breath or cough  No current nausea, vomiting, or diarrhea  No current chest pain or palpitations      Objective   Objective     Vital Signs:   Temp:  [98.3 °F (36.8 °C)-98.8 °F (37.1 °C)] 98.3 °F (36.8 °C)  Heart Rate:  [54-93] 54  Resp:  [16-18] 17  BP: (137-177)/() 177/96  Total (NIH Stroke Scale): 2     Physical Exam:  Constitutional:Awake, alert  HENT: NCAT, mucous membranes moist, neck supple  Respiratory: Clear to auscultation bilaterally, respiratory effort normal, nonlabored breathing   Cardiovascular: RRR, normal radial pulses  Gastrointestinal: Positive bowel sounds, soft, nontender, nondistended  Musculoskeletal: Normal musculature for age, no lower extremity edema, BMI 25  Psychiatric: Appropriate affect, cooperative, conversational  Neurologic: Oriented x3, no slurred speech or facial droop, follows commands  Skin: No rashes or jaundice, warm    Results Reviewed:  Results from last 7 days   Lab Units 21  0343 21  0414 21  0604   WBC 10*3/mm3 12.85* 15.74* 12.86*   HEMOGLOBIN g/dL 8.7* 8.8* 8.6*   HEMATOCRIT % 26.4* 26.9* 25.4*   PLATELETS 10*3/mm3 250 225 201     Results from last 7 days   Lab Units 21  0343 21  0414 21  0604 21  0636 21  0153 211  11/29/21 2121   SODIUM mmol/L 133* 135* 134*   < > 132*   < > 132*   POTASSIUM mmol/L 4.6 5.1 4.8   < > 5.0   < > 5.0   CHLORIDE mmol/L 102 107 103   < > 100   < > 97*   CO2 mmol/L 17.5* 18.0* 19.7*   < > 23.2   < > 24.1   BUN mg/dL 49* 44* 41*   < > 41*   < > 40*   CREATININE mg/dL 3.89* 3.95* 4.21*   < > 4.02*   < > 4.22*   GLUCOSE mg/dL 133* 143* 109*   < > 293*   < > 292*   CALCIUM mg/dL 7.0* 7.2* 6.8*   < > 7.6*   < > 7.9*   ALT (SGPT) U/L  --   --   --   --   --   --  12   AST (SGOT) U/L  --   --   --   --   --   --  23   TROPONIN T ng/mL  --   --   --   --  0.117*  --  0.132*   PROBNP pg/mL  --   --   --   --   --   --  5,443.0*    < > = values in this interval not displayed.     Estimated Creatinine Clearance: 13.9 mL/min (A) (by C-G formula based on SCr of 3.89 mg/dL (H)).    Microbiology Results Abnormal     Procedure Component Value - Date/Time    COVID PRE-OP / PRE-PROCEDURE SCREENING ORDER (NO ISOLATION) - Swab, Nasopharynx [[id number]]  (Normal) Collected: 12/06/21 0612    Lab Status: Final result Specimen: Swab from Nasopharynx Updated: 12/06/21 0648    Narrative:      The following orders were created for panel order COVID PRE-OP / PRE-PROCEDURE SCREENING ORDER (NO ISOLATION) - Swab, Nasopharynx.  Procedure                               Abnormality         Status                     ---------                               -----------         ------                     [facility]-HOUSE...[[id number]]  Normal              Final result                 Please view results for these tests on the individual orders.    [facility] IN-HOUSE CEPHEID/MARTY NP SWAB IN TRANSPORT MEDIA 8-12 HR TAT - Swab, Nasopharynx [[id number]]  (Normal) Collected: 12/06/21 0612    Lab Status: Final result Specimen: Swab from Nasopharynx Updated: 12/06/21 0648     COVID19 Not Detected    Narrative:      Fact sheet for providers: https://www.fda.gov/media/084067/download    Fact sheet for patients:  https://www.fda.gov/media/983215/download    Test performed by PCR.    Clostridium Difficile Toxin, PCR - Stool, Per Rectum [[id number]]  (Normal) Collected: 12/02/21 0813    Lab Status: Final result Specimen: Stool from Per Rectum Updated: 12/02/21 0913     C. Difficile Toxins by PCR Negative    COVID PRE-OP / PRE-PROCEDURE SCREENING ORDER (NO ISOLATION) - Swab, Nasopharynx [[id number]]  (Normal) Collected: 11/29/21 2343    Lab Status: Final result Specimen: Swab from Nasopharynx Updated: 11/30/21 0444    Narrative:      The following orders were created for panel order COVID PRE-OP / PRE-PROCEDURE SCREENING ORDER (NO ISOLATION) - Swab, Nasopharynx.  Procedure                               Abnormality         Status                     ---------                               -----------         ------                     COVID-19,APTIMA PANTHER(...[[id number]]  Normal              Final result                 Please view results for these tests on the individual orders.    COVID-19,APTIMA PANTHER(MED),[facility], NP/OP SWAB IN UTM/VTM/SALINE TRANSPORT MEDIA,24 HR TAT - Swab, Nasopharynx [[id number]]  (Normal) Collected: 11/29/21 2343    Lab Status: Final result Specimen: Swab from Nasopharynx Updated: 11/30/21 0444     COVID19 Not Detected    Narrative:      Fact sheet for providers: https://www.fda.gov/media/060136/download     Fact sheet for patients: https://www.fda.gov/media/658074/download    Test performed by RT PCR.          Imaging Results (Last 24 Hours)     ** No results found for the last 24 hours. **          Results for orders placed during the hospital encounter of 11/29/21    Adult Transthoracic Echo Complete W/ Cont if Necessary Per Protocol    Interpretation Summary  · Calculated left ventricular EF = 61.8% Estimated left ventricular EF was in agreement with the calculated left ventricular EF. Left ventricular systolic function is normal.  · Left ventricular diastolic function is consistent with (grade II  w/high LAP) pseudonormalization.  · The left atrial cavity is mildly dilated.  · Trace tricuspid valve regurgitation is present. Calculated right ventricular systolic pressure from tricuspid regurgitation is 21.4 mmHg.      I have reviewed the medications:  Scheduled Meds:amLODIPine, 5 mg, Oral, Q24H  [MAR Hold] aspirin, 81 mg, Oral, Daily  cefTRIAXone, 1 g, Intravenous, Q24H  [MAR Hold] escitalopram, 20 mg, Oral, Daily  hydrALAZINE, 25 mg, Oral, Q8H  [MAR Hold] insulin lispro, 0-7 Units, Subcutaneous, TID AC  [MAR Hold] levothyroxine, 175 mcg, Oral, Q AM  [MAR Hold] melatonin, 3 mg, Oral, Nightly  metoprolol tartrate, 25 mg, Oral, Q12H  [MAR Hold] pantoprazole, 40 mg, Oral, Daily  [MAR Hold] rOPINIRole, 0.75 mg, Oral, Nightly  [MAR Hold] sertraline, 100 mg, Oral, Daily  [MAR Hold] sodium bicarbonate, 1,300 mg, Oral, TID  [MAR Hold] sodium chloride, 10 mL, Intravenous, Q12H  sodium chloride, 3 mL, Intravenous, Q12H      Continuous Infusions:sodium chloride, 9 mL/hr      PRN Meds:.•  [MAR Hold] acetaminophen **OR** [DISCONTINUED] acetaminophen **OR** [DISCONTINUED] acetaminophen  •  [MAR Hold] dextrose  •  [MAR Hold] dextrose  •  fentanyl  •  [MAR Hold] glucagon (human recombinant)  •  labetalol  •  lidocaine PF 1%  •  [MAR Hold] loperamide  •  midazolam  •  [MAR Hold] nitroglycerin  •  [MAR Hold] prochlorperazine  •  [MAR Hold] sodium chloride  •  sodium chloride  •  sodium chloride    Assessment/Plan   Assessment & Plan     Active Hospital Problems    Diagnosis  POA   • **MAYUR (acute kidney injury) (HCC) [N17.9]  Yes   • PRES (posterior reversible encephalopathy syndrome) [I67.83]  Yes   • Enteritis [K52.9]  Clinically Undetermined   • Medically noncompliant [Z91.19]  Not Applicable   • Myocardial infarction due to demand ischemia (HCC) [I21.A1]  Yes   • Angioedema [T78.3XXA]  Yes   • Anemia [D64.9]  Yes   • HTN (hypertension) [I10]  Yes   • Hypothyroidism [E03.9]  Yes   • Type 2 diabetes mellitus with  hyperglycemia, with long-term current use of insulin (HCC) [E11.65, Z79.4]  Not Applicable   • Rheumatoid arthritis (HCC) [M06.9]  Yes      Resolved Hospital Problems    Diagnosis Date Resolved POA   • Hypertensive urgency [I16.0] 11/30/2021 Yes        Brief Hospital Course to date:  Zaina Martinez is a 56 y.o. female presents with acute kidney injury, enteritis, hypothyroidism, and acute confusion felt to be related to PRES.       Discussion/plan for today:  Renal function remains abnormal and somewhat stable.  Acidosis persists.  Patient somewhat somnolent and has slow mentation.  Nephrology is concerned she is starting to develop uremia.  Plan is for tunneled catheter and to initiate dialysis.  This seems reasonable.  Patient in agreement with plan.  Continue to monitor urine output, renally dose medication, and avoid nephrotoxins.    Case discussed with nephrology     PRES appears to be resolving.  Patient is now oriented but is still a somewhat poor historian.  Neurology has signed off.  Continue monitoring orientation.  Avoid fluctuations of blood pressure if possible.     Hypothyroidism: TSH is severely elevated.  Restart home Synthroid.  Recommend she have free T4 and TSH within 1 month to continue to monitor.  Patient admits to missing her thyroid medication.  I explained to her that this is a very important medication.     Enteritis and UTI:  Abdominal symptoms resolved.  Completed course of ceftriaxone.     Diabetes: Monitor glucose and adjust as needed.  A1c 9.8.  Poorly controlled.  Start Tradjenta.  Correction insulin.     Demand ischemia: Medical management.  Cardiology has evaluated.  Normal echocardiogram.  Needs outpatient follow-up with cardiology.  She agrees with this plan.     Severe anemia:  Status post transfusion.  Hemoccult negative.  No active bleeding reported.  Normocytic.  Mostly consistent with anemia of chronic disease or chronic kidney disease.    B12 relatively normal.  Folic acid is  borderline low.  Start folic acid supplements.     DVT Prophylaxis: Mechanical      Disposition: Pending    CODE STATUS:   Code Status and Medical Interventions:   Ordered at: 11/30/21 0115     Code Status (Patient has no pulse and is not breathing):    CPR (Attempt to Resuscitate)     Medical Interventions (Patient has pulse or is breathing):    Full Support       Osmar Alva MD  12/06/21

## 2021-12-06 NOTE — ANESTHESIA PROCEDURE NOTES
Airway  Urgency: elective    Date/Time: 12/6/2021 1:17 PM  Airway not difficult    General Information and Staff    Patient location during procedure: OR  Anesthesiologist: Nicho Hi MD  CRNA: Dinesh Martin CRNA    Indications and Patient Condition  Indications for airway management: airway protection    Preoxygenated: yes  MILS not maintained throughout  Mask difficulty assessment: 1 - vent by mask    Final Airway Details  Final airway type: supraglottic airway      Successful airway: LMA and unique  Size 4    Number of attempts at approach: 1  Assessment: lips, teeth, and gum same as pre-op    Additional Comments  Pre O2, SIAI

## 2021-12-06 NOTE — OP NOTE
Date of Admission:  11/29/2021 12/06/21  Keli Salazar MD  [facility]    Preoperative Diagnosis: Acute renal failure    Postoperative Diagnosis: same as above    Procedure Performed:     1)  Tunneled catheter insertion into the Right internal jugular vein  2)  Ultrasound guided vascular access  3)  Radiologic supervision of catheter tip placement    CPT 92691, 17499, 85686    Surgeon: Keli Salazar MD    Assistant:  Ruth LAWSON, Provided critical assistance in exposure, retraction, and suction that overall decrease blood loss and operative time.    Anesthesia: MAC with local    Estimated Blood Loss:  Minimal    Findings:     PatentRight internal jugular vein vein on ultrasound.  Under real time ultrasound visualization needle accessed of the vein was performed.  Ultrasound image of access printed and stored in the medical record.    Both blue and red ports draw and flush without resistance    Successful  placement of a 23 cm Palindrome catheter in the Right internal jugular vein.    Implants:    Nothing was implanted during the procedure    Specimen: none    Complications: none    Dispo: to PACU    Indication for procedure: 56 y.o. female with chronic kidney disease who has developed acute renal failure.  It was decided to proceed with tunneled catheter placement.     Description of procedure:     The patient was taken to the operating room. Vein was interrogated with an ultrasound which appeared to be adequate for catheter placement. Surgical sites were prepped and draped in the usual sterile fashion. A full surgical timeout was performed.  The skin overlying the vein was infiltrated with local. Under ultrasound guidance, the vein was accessed and wire was placed under fluoroscopic guidance into the cava.  11 bladed scalpel was used to make a half centimeter skin neck at the vascular insertion site.  Local anesthetic was used to anesthetize the tunneling tract of the palindrome catheter.   Half centimeters skin neck was made at the planned exit site of the catheter.  Serial dilation was performed under fluoroscopic guidance to vascular access site with the supplied vascular dilators.  Peel-away sheath and dilator were placed under fluoroscopic guidance over the guidewire.  Palindrome catheter was tunneled from the exit site to the vascular insertion site.  Wire and dilator were removed from the peel-away sheath.  Catheter was placed down into the peel-away sheath into the vena cava.  Catheter was withdrawn until adequate tip placement under fluoroscopic guidance.  Fluoroscopy of the apex of the catheter was performed to ensure no kinking.  Catheter was secured in place with nylon sutures.  Thre vascular access site was closed using Vicryl suture.  Sterile dressings were applied throughout.    Keli Salazar MD  12/06/21    Active Hospital Problems    Diagnosis  POA   • **MAYUR (acute kidney injury) (HCC) [N17.9]  Yes   • PRES (posterior reversible encephalopathy syndrome) [I67.83]  Yes   • Enteritis [K52.9]  Clinically Undetermined   • Medically noncompliant [Z91.19]  Not Applicable   • Myocardial infarction due to demand ischemia (HCC) [I21.A1]  Yes   • Angioedema [T78.3XXA]  Yes   • Anemia [D64.9]  Yes   • HTN (hypertension) [I10]  Yes   • Hypothyroidism [E03.9]  Yes   • Type 2 diabetes mellitus with hyperglycemia, with long-term current use of insulin (HCC) [E11.65, Z79.4]  Not Applicable   • Rheumatoid arthritis (HCC) [M06.9]  Yes      Resolved Hospital Problems    Diagnosis Date Resolved POA   • Hypertensive urgency [I16.0] 11/30/2021 Yes       .

## 2021-12-07 LAB
ALBUMIN SERPL-MCNC: 2 G/DL (ref 3.5–5.2)
ANA SER QL: NEGATIVE
ANION GAP SERPL CALCULATED.3IONS-SCNC: 12.5 MMOL/L (ref 5–15)
BACTERIA SPEC AEROBE CULT: ABNORMAL
BASOPHILS # BLD AUTO: 0.05 10*3/MM3 (ref 0–0.2)
BASOPHILS NFR BLD AUTO: 0.4 % (ref 0–1.5)
BUN SERPL-MCNC: 32 MG/DL (ref 6–20)
BUN/CREAT SERPL: 12.5 (ref 7–25)
C-ANCA TITR SER IF: NORMAL TITER
C3 SERPL-MCNC: 89 MG/DL (ref 82–167)
C4 SERPL-MCNC: 24 MG/DL (ref 12–38)
CALCIUM SPEC-SCNC: 7.2 MG/DL (ref 8.6–10.5)
CHLORIDE SERPL-SCNC: 99 MMOL/L (ref 98–107)
CO2 SERPL-SCNC: 17.5 MMOL/L (ref 22–29)
CREAT SERPL-MCNC: 2.57 MG/DL (ref 0.57–1)
DEPRECATED RDW RBC AUTO: 46.1 FL (ref 37–54)
EOSINOPHIL # BLD AUTO: 0.09 10*3/MM3 (ref 0–0.4)
EOSINOPHIL NFR BLD AUTO: 0.7 % (ref 0.3–6.2)
ERYTHROCYTE [DISTWIDTH] IN BLOOD BY AUTOMATED COUNT: 15 % (ref 12.3–15.4)
GFR SERPL CREATININE-BSD FRML MDRD: 19 ML/MIN/1.73
GLUCOSE BLDC GLUCOMTR-MCNC: 142 MG/DL (ref 70–130)
GLUCOSE BLDC GLUCOMTR-MCNC: 164 MG/DL (ref 70–130)
GLUCOSE BLDC GLUCOMTR-MCNC: 278 MG/DL (ref 70–130)
GLUCOSE BLDC GLUCOMTR-MCNC: 283 MG/DL (ref 70–130)
GLUCOSE BLDC GLUCOMTR-MCNC: 87 MG/DL (ref 70–130)
GLUCOSE SERPL-MCNC: 302 MG/DL (ref 65–99)
HBV SURFACE AB SER RIA-ACNC: NORMAL
HCT VFR BLD AUTO: 27.8 % (ref 34–46.6)
HGB BLD-MCNC: 9.4 G/DL (ref 12–15.9)
IGA SERPL-MCNC: 492 MG/DL (ref 87–352)
IGG SERPL-MCNC: 533 MG/DL (ref 586–1602)
IGM SERPL-MCNC: 104 MG/DL (ref 26–217)
IMM GRANULOCYTES # BLD AUTO: 0.08 10*3/MM3 (ref 0–0.05)
IMM GRANULOCYTES NFR BLD AUTO: 0.6 % (ref 0–0.5)
LYMPHOCYTES # BLD AUTO: 1.29 10*3/MM3 (ref 0.7–3.1)
LYMPHOCYTES NFR BLD AUTO: 10 % (ref 19.6–45.3)
MCH RBC QN AUTO: 28.4 PG (ref 26.6–33)
MCHC RBC AUTO-ENTMCNC: 33.8 G/DL (ref 31.5–35.7)
MCV RBC AUTO: 84 FL (ref 79–97)
MONOCYTES # BLD AUTO: 0.8 10*3/MM3 (ref 0.1–0.9)
MONOCYTES NFR BLD AUTO: 6.2 % (ref 5–12)
MYELOPEROXIDASE AB SER IA-ACNC: <9 U/ML (ref 0–9)
NEUTROPHILS NFR BLD AUTO: 10.64 10*3/MM3 (ref 1.7–7)
NEUTROPHILS NFR BLD AUTO: 82.1 % (ref 42.7–76)
NRBC BLD AUTO-RTO: 0 /100 WBC (ref 0–0.2)
P-ANCA ATYPICAL TITR SER IF: NORMAL TITER
P-ANCA TITR SER IF: NORMAL TITER
PHOSPHATE SERPL-MCNC: 4.8 MG/DL (ref 2.5–4.5)
PLATELET # BLD AUTO: 274 10*3/MM3 (ref 140–450)
PMV BLD AUTO: 11.7 FL (ref 6–12)
POTASSIUM SERPL-SCNC: 4.1 MMOL/L (ref 3.5–5.2)
PROT PATTERN SERPL IFE-IMP: ABNORMAL
PROTEINASE3 AB SER IA-ACNC: <3.5 U/ML (ref 0–3.5)
RBC # BLD AUTO: 3.31 10*6/MM3 (ref 3.77–5.28)
RHEUMATOID FACT SERPL-ACNC: 490 IU/ML
SODIUM SERPL-SCNC: 129 MMOL/L (ref 136–145)
WBC NRBC COR # BLD: 12.95 10*3/MM3 (ref 3.4–10.8)

## 2021-12-07 PROCEDURE — 82962 GLUCOSE BLOOD TEST: CPT

## 2021-12-07 PROCEDURE — 63710000001 INSULIN LISPRO (HUMAN) PER 5 UNITS: Performed by: STUDENT IN AN ORGANIZED HEALTH CARE EDUCATION/TRAINING PROGRAM

## 2021-12-07 PROCEDURE — 80069 RENAL FUNCTION PANEL: CPT | Performed by: STUDENT IN AN ORGANIZED HEALTH CARE EDUCATION/TRAINING PROGRAM

## 2021-12-07 PROCEDURE — 86704 HEP B CORE ANTIBODY TOTAL: CPT | Performed by: INTERNAL MEDICINE

## 2021-12-07 PROCEDURE — 25010000002 CEFTRIAXONE PER 250 MG: Performed by: STUDENT IN AN ORGANIZED HEALTH CARE EDUCATION/TRAINING PROGRAM

## 2021-12-07 PROCEDURE — 86592 SYPHILIS TEST NON-TREP QUAL: CPT | Performed by: INTERNAL MEDICINE

## 2021-12-07 PROCEDURE — 86706 HEP B SURFACE ANTIBODY: CPT | Performed by: INTERNAL MEDICINE

## 2021-12-07 PROCEDURE — 85025 COMPLETE CBC W/AUTO DIFF WBC: CPT | Performed by: STUDENT IN AN ORGANIZED HEALTH CARE EDUCATION/TRAINING PROGRAM

## 2021-12-07 PROCEDURE — 63710000001 INSULIN LISPRO (HUMAN) PER 5 UNITS: Performed by: INTERNAL MEDICINE

## 2021-12-07 RX ORDER — OLANZAPINE 2.5 MG/1
2.5 TABLET ORAL EVERY 6 HOURS PRN
Status: DISCONTINUED | OUTPATIENT
Start: 2021-12-07 | End: 2021-12-10

## 2021-12-07 RX ORDER — INSULIN GLARGINE 100 [IU]/ML
6 INJECTION, SOLUTION SUBCUTANEOUS EVERY MORNING
Status: DISCONTINUED | OUTPATIENT
Start: 2021-12-07 | End: 2021-12-08

## 2021-12-07 RX ORDER — CHOLECALCIFEROL (VITAMIN D3) 125 MCG
5 CAPSULE ORAL NIGHTLY
Status: DISCONTINUED | OUTPATIENT
Start: 2021-12-07 | End: 2021-12-17 | Stop reason: HOSPADM

## 2021-12-07 RX ORDER — INSULIN LISPRO 100 [IU]/ML
2 INJECTION, SOLUTION INTRAVENOUS; SUBCUTANEOUS
Status: DISCONTINUED | OUTPATIENT
Start: 2021-12-07 | End: 2021-12-09

## 2021-12-07 RX ORDER — ESCITALOPRAM OXALATE 10 MG/1
10 TABLET ORAL DAILY
Status: DISCONTINUED | OUTPATIENT
Start: 2021-12-08 | End: 2021-12-07

## 2021-12-07 RX ORDER — TRAZODONE HYDROCHLORIDE 50 MG/1
50 TABLET ORAL NIGHTLY
Status: DISCONTINUED | OUTPATIENT
Start: 2021-12-07 | End: 2021-12-07

## 2021-12-07 RX ADMIN — INSULIN LISPRO 4 UNITS: 100 INJECTION, SOLUTION INTRAVENOUS; SUBCUTANEOUS at 08:23

## 2021-12-07 RX ADMIN — SODIUM BICARBONATE 1300 MG: 650 TABLET ORAL at 16:30

## 2021-12-07 RX ADMIN — Medication 5 MG: at 21:26

## 2021-12-07 RX ADMIN — ROPINIROLE HYDROCHLORIDE 0.75 MG: 0.5 TABLET, FILM COATED ORAL at 21:26

## 2021-12-07 RX ADMIN — LEVOTHYROXINE SODIUM 175 MCG: 0.17 TABLET ORAL at 06:43

## 2021-12-07 RX ADMIN — ASPIRIN 81 MG: 81 TABLET, CHEWABLE ORAL at 13:45

## 2021-12-07 RX ADMIN — METOPROLOL TARTRATE 25 MG: 25 TABLET, FILM COATED ORAL at 13:45

## 2021-12-07 RX ADMIN — HYDRALAZINE HYDROCHLORIDE 25 MG: 50 TABLET, FILM COATED ORAL at 06:42

## 2021-12-07 RX ADMIN — HYDRALAZINE HYDROCHLORIDE 25 MG: 50 TABLET, FILM COATED ORAL at 13:45

## 2021-12-07 RX ADMIN — PANTOPRAZOLE SODIUM 40 MG: 40 TABLET, DELAYED RELEASE ORAL at 13:45

## 2021-12-07 RX ADMIN — FOLIC ACID 1 MG: 1 TABLET ORAL at 13:45

## 2021-12-07 RX ADMIN — SODIUM BICARBONATE 1300 MG: 650 TABLET ORAL at 13:45

## 2021-12-07 RX ADMIN — ACETAMINOPHEN 650 MG: 325 TABLET, FILM COATED ORAL at 13:51

## 2021-12-07 RX ADMIN — HYDRALAZINE HYDROCHLORIDE 25 MG: 50 TABLET, FILM COATED ORAL at 21:26

## 2021-12-07 RX ADMIN — SODIUM BICARBONATE 1300 MG: 650 TABLET ORAL at 21:26

## 2021-12-07 RX ADMIN — INSULIN LISPRO 2 UNITS: 100 INJECTION, SOLUTION INTRAVENOUS; SUBCUTANEOUS at 18:50

## 2021-12-07 RX ADMIN — CEFTRIAXONE 1 G: 1 INJECTION, POWDER, FOR SOLUTION INTRAMUSCULAR; INTRAVENOUS at 21:26

## 2021-12-07 RX ADMIN — SODIUM CHLORIDE, PRESERVATIVE FREE 10 ML: 5 INJECTION INTRAVENOUS at 21:27

## 2021-12-07 RX ADMIN — SERTRALINE 100 MG: 100 TABLET, FILM COATED ORAL at 13:56

## 2021-12-07 RX ADMIN — LINAGLIPTIN 5 MG: 5 TABLET, FILM COATED ORAL at 13:45

## 2021-12-07 RX ADMIN — SODIUM CHLORIDE, PRESERVATIVE FREE 10 ML: 5 INJECTION INTRAVENOUS at 13:46

## 2021-12-07 RX ADMIN — METOPROLOL TARTRATE 25 MG: 25 TABLET, FILM COATED ORAL at 21:25

## 2021-12-07 RX ADMIN — AMLODIPINE BESYLATE 5 MG: 5 TABLET ORAL at 13:45

## 2021-12-07 NOTE — PROGRESS NOTES
"   LOS: 8 days    Patient Care Team:  Provider, No Known as PCP - General    Chief Complaint:    Chief Complaint   Patient presents with   • Altered Mental Status     Follow UP MAYUR CKD4  Subjective     Interval History:   She continues to complain of hallucination.  Had her first dialysis session yesterday.  Had also tunneled dialysis catheter placed yesterday.  Denies any nausea no vomiting appetite is improving.  Objective     Vital Signs  Temp:  [97.4 °F (36.3 °C)-98.8 °F (37.1 °C)] 98 °F (36.7 °C)  Heart Rate:  [59-66] 62  Resp:  [14-20] 16  BP: (141-175)/(70-98) 154/79    Flowsheet Rows      First Filed Value   Admission Height 157.5 cm (62\") Documented at 11/29/2021 2011   Admission Weight 59 kg (130 lb) Documented at 11/30/2021 0200          I/O this shift:  In: 240 [P.O.:240]  Out: -   I/O last 3 completed shifts:  In: 680 [P.O.:330; I.V.:350]  Out: 1000 [Urine:1000]    Intake/Output Summary (Last 24 hours) at 12/7/2021 1200  Last data filed at 12/7/2021 0950  Gross per 24 hour   Intake 920 ml   Output 600 ml   Net 320 ml       Physical Exam:  General Appearance: more alert today, no distress, +facial edema  Neck supple no JVD  Lungs CTA bilat no rales  CV RRR no m/g  abd soft NT/ND  vasc no pedal/ankle edema 2+ radial pulses                      Results Review:    Results from last 7 days   Lab Units 12/07/21  0418 12/06/21  0343 12/05/21  0414   SODIUM mmol/L 129* 133* 135*   POTASSIUM mmol/L 4.1 4.6 5.1   CHLORIDE mmol/L 99 102 107   CO2 mmol/L 17.5* 17.5* 18.0*   BUN mg/dL 32* 49* 44*   CREATININE mg/dL 2.57* 3.89* 3.95*   CALCIUM mg/dL 7.2* 7.0* 7.2*   GLUCOSE mg/dL 302* 133* 143*       Estimated Creatinine Clearance: 21 mL/min (A) (by C-G formula based on SCr of 2.57 mg/dL (H)).    Results from last 7 days   Lab Units 12/07/21  0418 12/06/21  0343 12/05/21  0414 12/04/21  0604 12/04/21  0604 12/03/21  1018 12/02/21  1147   MAGNESIUM mg/dL  --   --   --   --  1.4*  --  1.5*   PHOSPHORUS mg/dL 4.8* 5.1* " 5.5*   < > 5.4*   < > 5.6*    < > = values in this interval not displayed.             Results from last 7 days   Lab Units 12/07/21  0418 12/06/21  0343 12/05/21  0414 12/04/21  0604 12/03/21  1018   WBC 10*3/mm3 12.95* 12.85* 15.74* 12.86* 13.71*   HEMOGLOBIN g/dL 9.4* 8.7* 8.8* 8.6* 10.3*   PLATELETS 10*3/mm3 274 250 225 201 235               Imaging Results (Last 24 Hours)     Procedure Component Value Units Date/Time    XR Chest Post CVA Port [[id number]] Collected: 12/06/21 1420     Updated: 12/06/21 1424    Narrative:      XR CHEST POST CVA PORT-     Clinical: Central venous catheter placement     COMPARISON 11/29/2021     FINDINGS: Newly placed central venous catheter tip superimposes the  right atrium. No pneumothorax. The lungs are clear. No effusion or  edema. Cardiomediastinal silhouette is stable.     CONCLUSION: Central venous catheter position is satisfactory, no  pneumothorax.     This report was finalized on 12/6/2021 2:21 PM by Dr. Vazquez Pollard M.D.       Arteriogram (Autofinalize) [[id number]] Resulted: 12/06/21 1347     Updated: 12/06/21 1347    Narrative:      This procedure was auto-finalized with no dictation required.        amLODIPine, 5 mg, Oral, Q24H  aspirin, 81 mg, Oral, Daily  cefTRIAXone, 1 g, Intravenous, Q24H  folic acid, 1 mg, Oral, Daily  hydrALAZINE, 25 mg, Oral, Q8H  insulin glargine, 6 Units, Subcutaneous, QAM  insulin lispro, 0-7 Units, Subcutaneous, TID AC  insulin lispro, 2 Units, Subcutaneous, TID With Meals  levothyroxine, 175 mcg, Oral, Q AM  linagliptin, 5 mg, Oral, Daily  melatonin, 5 mg, Oral, Nightly  metoprolol tartrate, 25 mg, Oral, Q12H  pantoprazole, 40 mg, Oral, Daily  rOPINIRole, 0.75 mg, Oral, Nightly  [START ON 12/8/2021] sertraline, 50 mg, Oral, Daily  sodium bicarbonate, 1,300 mg, Oral, TID  sodium chloride, 10 mL, Intravenous, Q12H      sodium chloride, 9 mL/hr, Last Rate: 9 mL/hr (12/06/21 1210)        Medication Review:   Current Facility-Administered  Medications   Medication Dose Route Frequency Provider Last Rate Last Admin   • acetaminophen (TYLENOL) tablet 650 mg  650 mg Oral Q4H PRN Keli Salazar MD   650 mg at 12/06/21 0837   • amLODIPine (NORVASC) tablet 5 mg  5 mg Oral Q24H Keli Salazar MD   5 mg at 12/06/21 0837   • aspirin chewable tablet 81 mg  81 mg Oral Daily Keli Salazar MD   81 mg at 12/06/21 1750   • cefTRIAXone (ROCEPHIN) 1 g in sodium chloride 0.9 % 100 mL IVPB-VTB  1 g Intravenous Q24H Keli Salazar  mL/hr at 12/06/21 2130 1 g at 12/06/21 2130   • dextrose (D50W) (25 g/50 mL) IV injection 25 g  25 g Intravenous Q15 Min PRN Keli Salazar MD       • dextrose (GLUTOSE) oral gel 15 g  15 g Oral Q15 Min PRN Keli Salazar MD       • folic acid (FOLVITE) tablet 1 mg  1 mg Oral Daily Keli Salazar MD   1 mg at 12/06/21 2130   • glucagon (human recombinant) (GLUCAGEN DIAGNOSTIC) injection 1 mg  1 mg Subcutaneous PRN Keli Salazar MD       • hydrALAZINE (APRESOLINE) tablet 25 mg  25 mg Oral Q8H Keli Salazar MD   25 mg at 12/07/21 0642   • HYDROcodone-acetaminophen (NORCO) 5-325 MG per tablet 1 tablet  1 tablet Oral Q6H PRN Keli Salazar MD       • insulin glargine (LANTUS, SEMGLEE) injection 6 Units  6 Units Subcutaneous QAM Osmar Alva MD       • insulin lispro (ADMELOG) injection 0-7 Units  0-7 Units Subcutaneous TID AC Keli Salazar MD   4 Units at 12/07/21 0823   • insulin lispro (ADMELOG) injection 2 Units  2 Units Subcutaneous TID With Meals Osmar Alva MD       • labetalol (NORMODYNE,TRANDATE) injection 10 mg  10 mg Intravenous Q6H PRN Keli Salazar MD   10 mg at 12/01/21 1340   • levothyroxine (SYNTHROID, LEVOTHROID) tablet 175 mcg  175 mcg Oral Q AM Keli Salazar MD   175 mcg at 12/07/21 0643   • linagliptin (TRADJENTA) tablet 5 mg  5 mg Oral Daily Keli Salazar MD   5 mg at 12/06/21 2130   • loperamide (IMODIUM)  capsule 2 mg  2 mg Oral 4x Daily PRN Keli Salazar MD   2 mg at 12/03/21 1519   • melatonin tablet 5 mg  5 mg Oral Nightly Osmar Alva MD       • metoprolol tartrate (LOPRESSOR) tablet 25 mg  25 mg Oral Q12H Keli Salazar MD   25 mg at 12/06/21 2130   • nitroglycerin (NITROSTAT) SL tablet 0.4 mg  0.4 mg Sublingual Q5 Min PRN Keli Salazar MD       • pantoprazole (PROTONIX) EC tablet 40 mg  40 mg Oral Daily Keli Salazar MD   40 mg at 12/06/21 0838   • prochlorperazine (COMPAZINE) injection 5 mg  5 mg Intravenous Q6H PRN Keli Salazar MD       • rOPINIRole (REQUIP) tablet 0.75 mg  0.75 mg Oral Nightly Keli Salazar MD   0.75 mg at 12/06/21 2129   • [START ON 12/8/2021] sertraline (ZOLOFT) tablet 50 mg  50 mg Oral Daily Osmar Alva MD       • sodium bicarbonate tablet 1,300 mg  1,300 mg Oral TID Keli Salazar MD   1,300 mg at 12/06/21 2130   • sodium chloride 0.9 % flush 10 mL  10 mL Intravenous Q12H Keli Salazar MD   10 mL at 12/06/21 2130   • sodium chloride 0.9 % flush 10 mL  10 mL Intravenous PRN Keli Salazar MD       • sodium chloride 0.9 % infusion  9 mL/hr Intravenous Continuous PRN Keli Salazar MD 9 mL/hr at 12/06/21 1210 9 mL/hr at 12/06/21 1210       Assessment/Plan   1.  MAYUR, CKD stage 4 - Cr down slightly 4.2 to 3.9 but has fluctuated 3.5 to 4.2 over stay and was notably 2.1 on 8/7/21 (vs 1.0 in 2019), overall picture more c/w progressive (albeit rapid) CKD (ie diabetic nephropathy) rather than MAUYR.  Sent off GN serologies but doubt will be revealing.  Has multiple uremic sx and eGFR likely worse than 12 mL/min predicted by MDRD based on scant muscle mass.  Only minimal left hydronephrosis on CT so doubt significant enough to be playing any role.  K 5.1.  Phos 5.5  2.  Facial swelling and confusion, both improving.  Probably due to myxedema from severe hypothyroidism.  In light of extremely elevated TSH, ARB  as culprit for facial swelling is much less likely  3.  DM2 with poor control  4.  Anemia, hgb up slightly 8.7   5.  Hypertension, watch for over-correction  6.  Type II non-NSTEMI  7.  Severe hypothyroidism.  This is probably driving her mild hyponatremia as well  8.  Medication noncompliance  9.  Diarrhea, resolved   10.NAGMA -    Plan  We will dialyze today for the second session for possible uremic symptoms including hallucination.   No signs of renal recovery at this time as her urine output remains marginal  Continue to monitor electrolyte trends.        MAYUR (acute kidney injury) (HCC)    HTN (hypertension)    Hypothyroidism    Type 2 diabetes mellitus with hyperglycemia, with long-term current use of insulin (HCC)    Rheumatoid arthritis (HCC)    Angioedema    Anemia    Medically noncompliant    Myocardial infarction due to demand ischemia (HCC)    Enteritis    PRES (posterior reversible encephalopathy syndrome)              Reynaldo Sotelo MD  12/07/21  12:00 EST

## 2021-12-07 NOTE — PROGRESS NOTES
Name: Zaina Martinez ADMIT: 2021   : 1965  PCP: Provider, No Known    MRN: [id number] LOS: 8 days   AGE/SEX: 56 y.o. female  ROOM: [address]    [place], Subsequent Hospital Care    Chief Complaint   Patient presents with   • Altered Mental Status     CC: Acute renal failure progressing to chronic renal failure  Subjective     56 y.o. female with apparent end-stage renal failure.  Her acute stage is likely going to turn into total chronic failure and we will start by mapping her arms.  She will likely need long-term access and at least we will build to save any viable vein in her arm hopefully.  We will see what it shows and discuss it with her when these results are back.  She is agreeable.    Review of Systems hallucinating    Objective     Scheduled Medications:   amLODIPine, 5 mg, Oral, Q24H  aspirin, 81 mg, Oral, Daily  cefTRIAXone, 1 g, Intravenous, Q24H  folic acid, 1 mg, Oral, Daily  hydrALAZINE, 25 mg, Oral, Q8H  insulin glargine, 6 Units, Subcutaneous, QAM  insulin lispro, 0-7 Units, Subcutaneous, TID AC  insulin lispro, 2 Units, Subcutaneous, TID With Meals  levothyroxine, 175 mcg, Oral, Q AM  linagliptin, 5 mg, Oral, Daily  melatonin, 5 mg, Oral, Nightly  metoprolol tartrate, 25 mg, Oral, Q12H  pantoprazole, 40 mg, Oral, Daily  rOPINIRole, 0.75 mg, Oral, Nightly  [START ON 2021] sertraline, 50 mg, Oral, Daily  sodium bicarbonate, 1,300 mg, Oral, TID  sodium chloride, 10 mL, Intravenous, Q12H        Active Infusions:  sodium chloride, 9 mL/hr, Last Rate: 9 mL/hr (21 1210)        As Needed Medications:  •  acetaminophen **OR** [DISCONTINUED] acetaminophen **OR** [DISCONTINUED] acetaminophen  •  dextrose  •  dextrose  •  glucagon (human recombinant)  •  HYDROcodone-acetaminophen  •  labetalol  •  loperamide  •  nitroglycerin  •  OLANZapine  •  prochlorperazine  •  sodium chloride  •  sodium chloride    Vital Signs  Vital Signs Patient Vitals for the past 24 hrs:    BP Temp Temp src Pulse Resp SpO2   12/07/21 1410 (!) 155/119 97.2 °F (36.2 °C) Oral 68 18 --   12/07/21 1337 (!) 195/96 97.8 °F (36.6 °C) Oral 64 18 --   12/07/21 1236 (!) 182/92 98.2 °F (36.8 °C) Temporal 66 16 --   12/07/21 0925 154/79 98 °F (36.7 °C) Temporal 62 16 --   12/07/21 0743 170/89 97.8 °F (36.6 °C) Oral 62 18 --   12/07/21 0642 153/77 -- -- 62 -- --   12/07/21 0504 -- -- -- 62 -- 96 %   12/07/21 0309 -- -- -- 61 -- 97 %   12/07/21 0104 -- -- -- 64 -- 96 %   12/06/21 2329 146/85 98.2 °F (36.8 °C) Oral 65 20 95 %   12/06/21 2132 -- -- -- 63 -- --   12/06/21 2130 153/78 -- -- 64 -- --   12/06/21 1944 153/78 97.4 °F (36.3 °C) Oral 66 16 96 %   12/06/21 1738 175/98 -- -- 64 16 --     I/O:  I/O last 3 completed shifts:  In: 680 [P.O.:330; I.V.:350]  Out: 1000 [Urine:1000]  BMI:  Body mass index is 24.69 kg/m².    Physical Exam:  Physical Exam   Lungs clear and equal  Heart regular rate and rhythm  Abdomen benign  Extremities viable    Results Review:     CBC    Results from last 7 days   Lab Units 12/07/21  0418 12/06/21  0343 12/05/21  0414 12/04/21  0604 12/03/21  1018 12/02/21  1147 12/01/21  0636   WBC 10*3/mm3 12.95* 12.85* 15.74* 12.86* 13.71* 10.56 12.16*   HEMOGLOBIN g/dL 9.4* 8.7* 8.8* 8.6* 10.3* 9.2* 10.0*   PLATELETS 10*3/mm3 274 250 225 201 235 170 184     BMP   Results from last 7 days   Lab Units 12/07/21  0418 12/06/21  0343 12/05/21  0414 12/04/21  0604 12/03/21  1018 12/02/21  1147 12/01/21  0636   SODIUM mmol/L 129* 133* 135* 134* 132* 134* 132*   POTASSIUM mmol/L 4.1 4.6 5.1 4.8 4.9 4.8 4.5   CHLORIDE mmol/L 99 102 107 103 103 105 101   CO2 mmol/L 17.5* 17.5* 18.0* 19.7* 19.2* 18.7* 22.6   BUN mg/dL 32* 49* 44* 41* 40* 42* 41*   CREATININE mg/dL 2.57* 3.89* 3.95* 4.21* 3.54* 4.22* 3.76*   GLUCOSE mg/dL 302* 133* 143* 109* 105* 74 100*   MAGNESIUM mg/dL  --   --   --  1.4*  --  1.5*  --    PHOSPHORUS mg/dL 4.8* 5.1* 5.5* 5.4* 5.3* 5.6*  --      Cr Clearance Estimated Creatinine Clearance: 21  mL/min (A) (by C-G formula based on SCr of 2.57 mg/dL (H)).  Coag     HbA1C   Lab Results   Component Value Date    HGBA1C 9.78 (H) 11/30/2021    HGBA1C 12.5 (H) 03/30/2021     Blood Glucose   Glucose   Date/Time Value Ref Range Status   12/07/2021 1615 142 (H) 70 - 130 mg/dL Final     Comment:     Meter: [id number] : [id number] [place] NA   12/07/2021 1338 87 70 - 130 mg/dL Final     Comment:     Meter: [id number] : [id number] Emile Roea NA   12/07/2021 0746 278 (H) 70 - 130 mg/dL Final     Comment:     Meter: [id number] : [id number] [place] NA   12/07/2021 0614 283 (H) 70 - 130 mg/dL Final     Comment:     Meter: [id number] : [id number] Mark Marlena NA   12/06/2021 2026 284 (H) 70 - 130 mg/dL Final     Comment:     Meter: [id number] : [id number] Mark Marlena NA   12/06/2021 1749 105 70 - 130 mg/dL Final     Comment:     Meter: [id number] : [id number] Brigida Gamble AMY   12/06/2021 1107 117 70 - 130 mg/dL Final     Comment:     Meter: [id number] : [id number] [place] NA   12/06/2021 0630 141 (H) 70 - 130 mg/dL Final     Comment:     Meter: [id number] : [id number] Jay SAAVEDRA     Infection   Results from last 7 days   Lab Units 12/04/21  1638   URINECX  >100,000 CFU/mL Klebsiella pneumoniae ssp pneumoniae*     CMP   Results from last 7 days   Lab Units 12/07/21  0418 12/06/21  0343 12/05/21  0414 12/04/21  0604 12/03/21  1018 12/02/21  1147 12/01/21  0636   SODIUM mmol/L 129* 133* 135* 134* 132* 134* 132*   POTASSIUM mmol/L 4.1 4.6 5.1 4.8 4.9 4.8 4.5   CHLORIDE mmol/L 99 102 107 103 103 105 101   CO2 mmol/L 17.5* 17.5* 18.0* 19.7* 19.2* 18.7* 22.6   BUN mg/dL 32* 49* 44* 41* 40* 42* 41*   CREATININE mg/dL 2.57* 3.89* 3.95* 4.21* 3.54* 4.22* 3.76*   GLUCOSE mg/dL 302* 133* 143* 109* 105* 74 100*   ALBUMIN g/dL 2.00* 2.10* 2.10* 2.00* 2.10* 2.10*  --      Radiology(recent) No radiology results for the last day    Assessment/Plan     Assessment & Plan       MAYUR (acute kidney injury) (HCC)    HTN (hypertension)    Hypothyroidism    Type 2 diabetes mellitus with hyperglycemia, with long-term current use of insulin (HCC)    Rheumatoid arthritis (HCC)    Angioedema    Anemia    Medically noncompliant    Myocardial infarction due to demand ischemia (HCC)    Enteritis    PRES (posterior reversible encephalopathy syndrome)      56 y.o. female status post tunneled dialysis catheter.  Site looks good.  Progressing to chronic renal failure.  Plans for mapping of the upper extremities to at least preserve vein and plan for fistula hopefully.  She is aware and agreeable.  Will discuss after results are back.      Mat Cummings MD  12/07/21  17:12 EST    Please call my office with any question: [phone]    Active Hospital Problems    Diagnosis  POA   • **MAYUR (acute kidney injury) (HCC) [N17.9]  Yes   • PRES (posterior reversible encephalopathy syndrome) [I67.83]  Yes   • Enteritis [K52.9]  Clinically Undetermined   • Medically noncompliant [Z91.19]  Not Applicable   • Myocardial infarction due to demand ischemia (HCC) [I21.A1]  Yes   • Angioedema [T78.3XXA]  Yes   • Anemia [D64.9]  Yes   • HTN (hypertension) [I10]  Yes   • Hypothyroidism [E03.9]  Yes   • Type 2 diabetes mellitus with hyperglycemia, with long-term current use of insulin (HCC) [E11.65, Z79.4]  Not Applicable   • Rheumatoid arthritis (HCC) [M06.9]  Yes      Resolved Hospital Problems    Diagnosis Date Resolved POA   • Hypertensive urgency [I16.0] 11/30/2021 Yes

## 2021-12-07 NOTE — PLAN OF CARE
Goal Outcome Evaluation:  Plan of Care Reviewed With: patient        Progress: improving  Outcome Summary: New onset of 7 beats and 13 beats of VE runs last night, MD sutherland and Will MELÉNDEZ called back, no new order at this point, continue to monitor, pt asymptomatic. Bed alarm set on zone 2. VSS, will continue to monitor.

## 2021-12-07 NOTE — PLAN OF CARE
Goal Outcome Evaluation:  Plan of Care Reviewed With: patient        Progress: improving  Outcome Summary: VSS. Pt had tunnel cath placed today. Dialysis started today. BP elevated. Bed alarm set. Will continue to monitor.

## 2021-12-07 NOTE — CASE MANAGEMENT/SOCIAL WORK
Continued Stay Note  [facility]     Patient Name: Zaina Martniez  MRN: [id number]  Today's Date: 12/7/2021    Admit Date: 11/29/2021     Discharge Plan     Row Name 12/07/21 1218       Plan    Plan Home with Spouse. Submitted for outpatient HD chair at [facility]. PT eval for multiple falls pending. Family to transport.    Patient/Family in Agreement with Plan yes    Plan Comments Pt with new start of HD this admit. Submitted for outpatient HD chair at [facility]. Reported that pt has had multiple falls. PT eval pending. Francisco J Patterson RN-BC               Discharge Codes    No documentation.               Expected Discharge Date and Time     Expected Discharge Date Expected Discharge Time    Dec 7, 2021             Francisco J Patterson RN

## 2021-12-07 NOTE — NURSING NOTE
HD complete, no fluid removed per MD orders. Post /92 HR 66 T 98. Patient tolerated treatment well

## 2021-12-07 NOTE — PROGRESS NOTES
[facility] Medicine Services  PROGRESS NOTE    Patient Name: Zaina Martinez  : 1965  MRN: [id number]    Date of Admission: 2021  Primary Care Physician: Provider, No Known    Subjective   Subjective     CC:  Follow-up renal failure.    HPI:  Patient notes she is sleeping very poorly last night.  She noted some confusion and strange dreams in the night.  She had an unusual feeling as if someone was sitting next to her and that even though she knew someone was not there.  She is denying other new complaints.    Review of Systems  No current fevers or chills  No current shortness of breath or cough  No current nausea, vomiting, or diarrhea  No current chest pain or palpitations    Objective   Objective     Vital Signs:   Temp:  [97.4 °F (36.3 °C)-98.8 °F (37.1 °C)] 98 °F (36.7 °C)  Heart Rate:  [59-66] 62  Resp:  [14-20] 16  BP: (141-175)/(70-98) 154/79  Total (NIH Stroke Scale): 2     Physical Exam:  Constitutional:Awake, alert  HENT: NCAT, mucous membranes moist, neck supple  Respiratory: Clear to auscultation bilaterally, respiratory effort normal, nonlabored breathing   Cardiovascular: RRR, normal radial pulses  Gastrointestinal: Positive bowel sounds, soft, nontender, nondistended  Musculoskeletal: Normal musculature for age, no lower extremity edema, BMI 25  Psychiatric: Appropriate affect, cooperative, conversational  Neurologic: Oriented x3, no slurred speech or facial droop, follows commands  Skin: No rashes or jaundice, warm    Results Reviewed:  Results from last 7 days   Lab Units 21   WBC 10*3/mm3 12.95* 12.85* 15.74*   HEMOGLOBIN g/dL 9.4* 8.7* 8.8*   HEMATOCRIT % 27.8* 26.4* 26.9*   PLATELETS 10*3/mm3 274 250 225     Results from last 7 days   Lab Units 21   SODIUM mmol/L 129* 133* 135*   POTASSIUM mmol/L 4.1 4.6 5.1   CHLORIDE mmol/L 99 102 107   CO2 mmol/L 17.5* 17.5* 18.0*   BUN mg/dL 32* 49* 44*    CREATININE mg/dL 2.57* 3.89* 3.95*   GLUCOSE mg/dL 302* 133* 143*   CALCIUM mg/dL 7.2* 7.0* 7.2*     Estimated Creatinine Clearance: 21 mL/min (A) (by C-G formula based on SCr of 2.57 mg/dL (H)).    Microbiology Results Abnormal     Procedure Component Value - Date/Time    COVID PRE-OP / PRE-PROCEDURE SCREENING ORDER (NO ISOLATION) - Swab, Nasopharynx [[id number]]  (Normal) Collected: 12/06/21 0612    Lab Status: Final result Specimen: Swab from Nasopharynx Updated: 12/06/21 0648    Narrative:      The following orders were created for panel order COVID PRE-OP / PRE-PROCEDURE SCREENING ORDER (NO ISOLATION) - Swab, Nasopharynx.  Procedure                               Abnormality         Status                     ---------                               -----------         ------                     COVID-19,[facility] IN-HOUSE...[[id number]]  Normal              Final result                 Please view results for these tests on the individual orders.    COVID-19,[facility] IN-HOUSE CEPHEID/MARTY NP SWAB IN TRANSPORT MEDIA 8-12 HR TAT - Swab, Nasopharynx [[id number]]  (Normal) Collected: 12/06/21 0612    Lab Status: Final result Specimen: Swab from Nasopharynx Updated: 12/06/21 0648     COVID19 Not Detected    Narrative:      Fact sheet for providers: https://www.fda.gov/media/664745/download    Fact sheet for patients: https://www.fda.gov/media/832096/download    Test performed by PCR.    Clostridium Difficile Toxin, PCR - Stool, Per Rectum [[id number]]  (Normal) Collected: 12/02/21 0813    Lab Status: Final result Specimen: Stool from Per Rectum Updated: 12/02/21 0913     C. Difficile Toxins by PCR Negative    COVID PRE-OP / PRE-PROCEDURE SCREENING ORDER (NO ISOLATION) - Swab, Nasopharynx [[id number]]  (Normal) Collected: 11/29/21 9763    Lab Status: Final result Specimen: Swab from Nasopharynx Updated: 11/30/21 3330    Narrative:      The following orders were created for panel order COVID PRE-OP / PRE-PROCEDURE SCREENING  ORDER (NO ISOLATION) - Swab, Nasopharynx.  Procedure                               Abnormality         Status                     ---------                               -----------         ------                     COVID-19,APTIMA PANTHER(...[[id number]]  Normal              Final result                 Please view results for these tests on the individual orders.    COVID-19,APTIMA PANTHER(MED), NAZ, NP/OP SWAB IN UTM/VTM/SALINE TRANSPORT MEDIA,24 HR TAT - Swab, Nasopharynx [[id number]]  (Normal) Collected: 11/29/21 2343    Lab Status: Final result Specimen: Swab from Nasopharynx Updated: 11/30/21 0444     COVID19 Not Detected    Narrative:      Fact sheet for providers: https://www.fda.gov/media/217096/download     Fact sheet for patients: https://www.fda.gov/media/899333/download    Test performed by RT PCR.          Imaging Results (Last 24 Hours)     Procedure Component Value Units Date/Time    XR Chest Post CVA Port [[id number]] Collected: 12/06/21 1420     Updated: 12/06/21 1424    Narrative:      XR CHEST POST CVA PORT-     Clinical: Central venous catheter placement     COMPARISON 11/29/2021     FINDINGS: Newly placed central venous catheter tip superimposes the  right atrium. No pneumothorax. The lungs are clear. No effusion or  edema. Cardiomediastinal silhouette is stable.     CONCLUSION: Central venous catheter position is satisfactory, no  pneumothorax.     This report was finalized on 12/6/2021 2:21 PM by Dr. Vazquez Pollard M.D.       Arteriogram (Autofinalize) [[id number]] Resulted: 12/06/21 1347     Updated: 12/06/21 1347    Narrative:      This procedure was auto-finalized with no dictation required.          Results for orders placed during the hospital encounter of 11/29/21    Adult Transthoracic Echo Complete W/ Cont if Necessary Per Protocol    Interpretation Summary  · Calculated left ventricular EF = 61.8% Estimated left ventricular EF was in agreement with the calculated left ventricular  EF. Left ventricular systolic function is normal.  · Left ventricular diastolic function is consistent with (grade II w/high LAP) pseudonormalization.  · The left atrial cavity is mildly dilated.  · Trace tricuspid valve regurgitation is present. Calculated right ventricular systolic pressure from tricuspid regurgitation is 21.4 mmHg.      I have reviewed the medications:  Scheduled Meds:amLODIPine, 5 mg, Oral, Q24H  aspirin, 81 mg, Oral, Daily  cefTRIAXone, 1 g, Intravenous, Q24H  escitalopram, 20 mg, Oral, Daily  folic acid, 1 mg, Oral, Daily  hydrALAZINE, 25 mg, Oral, Q8H  insulin lispro, 0-7 Units, Subcutaneous, TID AC  levothyroxine, 175 mcg, Oral, Q AM  linagliptin, 5 mg, Oral, Daily  melatonin, 3 mg, Oral, Nightly  metoprolol tartrate, 25 mg, Oral, Q12H  pantoprazole, 40 mg, Oral, Daily  rOPINIRole, 0.75 mg, Oral, Nightly  sertraline, 100 mg, Oral, Daily  sodium bicarbonate, 1,300 mg, Oral, TID  sodium chloride, 10 mL, Intravenous, Q12H      Continuous Infusions:sodium chloride, 9 mL/hr, Last Rate: 9 mL/hr (12/06/21 1210)      PRN Meds:.•  acetaminophen **OR** [DISCONTINUED] acetaminophen **OR** [DISCONTINUED] acetaminophen  •  dextrose  •  dextrose  •  glucagon (human recombinant)  •  HYDROcodone-acetaminophen  •  labetalol  •  loperamide  •  nitroglycerin  •  prochlorperazine  •  sodium chloride  •  sodium chloride    Assessment/Plan   Assessment & Plan     Active Hospital Problems    Diagnosis  POA   • **MAYUR (acute kidney injury) ([facility]) [N17.9]  Yes   • PRES (posterior reversible encephalopathy syndrome) [I67.83]  Yes   • Enteritis [K52.9]  Clinically Undetermined   • Medically noncompliant [Z91.19]  Not Applicable   • Myocardial infarction due to demand ischemia ([facility]) [I21.A1]  Yes   • Angioedema [T78.3XXA]  Yes   • Anemia [D64.9]  Yes   • HTN (hypertension) [I10]  Yes   • Hypothyroidism [E03.9]  Yes   • Type 2 diabetes mellitus with hyperglycemia, with long-term current use of insulin (HCC) [E11.65,  Z79.4]  Not Applicable   • Rheumatoid arthritis (HCC) [M06.9]  Yes      Resolved Hospital Problems    Diagnosis Date Resolved POA   • Hypertensive urgency [I16.0] 11/30/2021 Yes        Brief Hospital Course to date:  Zaina Martinez is a 56 y.o. female presents with acute kidney injury, enteritis, hypothyroidism, and acute confusion felt to be related to PRES.       Discussion/plan for today:  Patient suffering from insomnia.  Plan to increase melatonin.  It is also noted that its atypical to be on 2 SSRIs.  Patient reports taking both Lexapro and Zoloft at home.  Unclear if this could be contributing.  Plan to discontinue Lexapro and reduce Zoloft dose for now to see if this helps.  Continue supportive care and symptom treatment.  Plan is for dialysis today.  Glucose elevated.  Insulin further adjusted.     PRES appears to be resolving.  Patient is now oriented but is still a somewhat poor historian.  Neurology has signed off.  Continue monitoring orientation.    Attempting to avoid fluctuations of blood pressure if possible.     Hypothyroidism: TSH is severely elevated.  Restart home Synthroid.  Recommend she have free T4 and TSH within 1 month to continue to monitor.  Patient admits to missing her thyroid medication.  I explained to her that this is a very important medication.     Enteritis and UTI:  Abdominal symptoms resolved.  Completed course of ceftriaxone.     Diabetes: Monitor glucose and adjust as needed.  A1c 9.8.  Poorly controlled.  Start Tradjenta.  Correction insulin.     Demand ischemia: Medical management.  Cardiology has evaluated.  Normal echocardiogram.  Needs outpatient follow-up with cardiology.  She agrees with this plan.     Severe anemia:  Status post transfusion.  Hemoccult negative.  No active bleeding reported.  Normocytic.  Mostly consistent with anemia of chronic disease or chronic kidney disease.    B12 relatively normal.  Folic acid is borderline low.  Start folic acid  supplements.     DVT Prophylaxis: Mechanical      Disposition: Pending    CODE STATUS:   Code Status and Medical Interventions:   Ordered at: 11/30/21 0115     Code Status (Patient has no pulse and is not breathing):    CPR (Attempt to Resuscitate)     Medical Interventions (Patient has pulse or is breathing):    Full Support       Osmar Alva MD  12/07/21

## 2021-12-08 ENCOUNTER — APPOINTMENT (OUTPATIENT)
Dept: CARDIOLOGY | Facility: HOSPITAL | Age: 56
End: 2021-12-08

## 2021-12-08 PROBLEM — A49.8 KLEBSIELLA INFECTION: Status: ACTIVE | Noted: 2021-12-08

## 2021-12-08 PROBLEM — R33.9 URINE RETENTION: Status: ACTIVE | Noted: 2021-12-08

## 2021-12-08 LAB
ALBUMIN SERPL-MCNC: 1.8 G/DL (ref 3.5–5.2)
ANION GAP SERPL CALCULATED.3IONS-SCNC: 5 MMOL/L (ref 5–15)
BASOPHILS # BLD AUTO: 0.04 10*3/MM3 (ref 0–0.2)
BASOPHILS NFR BLD AUTO: 0.4 % (ref 0–1.5)
BH CV VAS MEAS BASILIC ANTECUBITAL FOSSA LEFT: 0.15 CM
BH CV VAS MEAS BASILIC ANTECUBITAL FOSSA RIGHT: 0.13 CM
BH CV VAS MEAS BASILIC FOREARM RIGHT - DIST: 0.1 CM
BH CV VAS MEAS BASILIC FOREARM RIGHT - MID: 0.1 CM
BH CV VAS MEAS BASILIC FOREARM RIGHT - PROX: 0.1 CM
BH CV VAS MEAS BASILIC UPPER ARM LEFT - DIST: 0.16 CM
BH CV VAS MEAS BASILIC UPPER ARM LEFT - MID: 0.3 CM
BH CV VAS MEAS BASILIC UPPER ARM RIGHT - DIST: 0.16 CM
BH CV VAS MEAS BASILIC UPPER ARM RIGHT - MID: 0.25 CM
BH CV VAS MEAS CEPHALIC FOREARM LEFT - DIST: 0.06 CM
BH CV VAS MEAS CEPHALIC FOREARM LEFT - MID: 0.07 CM
BH CV VAS MEAS CEPHALIC FOREARM LEFT - PROX: 0.06 CM
BH CV VAS MEAS CEPHALIC FOREARM RIGHT - DIST: 0.08 CM
BH CV VAS MEAS CEPHALIC FOREARM RIGHT - MID: 0.11 CM
BH CV VAS MEAS CEPHALIC FOREARM RIGHT - PROX: 0.1 CM
BH CV VAS MEAS CEPHALIC UPPER ARM LEFT - DIST: 0.25 CM
BH CV VAS MEAS CEPHALIC UPPER ARM LEFT - MID: 0.23 CM
BH CV VAS MEAS CEPHALIC UPPER ARM LEFT - PROX: 0.13 CM
BH CV VAS MEAS CEPHALIC UPPER ARM RIGHT - MID: 0.21 CM
BH CV VAS MEAS CEPHALIC UPPER ARM RIGHT - PROX: 0.25 CM
BH CV VAS MEAS RADIAL UPPER ARM LEFT - DIST: 0.18 CM
BH CV VAS MEAS RADIAL UPPER ARM LEFT - MID: 0.14 CM
BH CV VAS MEAS RADIAL UPPER ARM LEFT - PROX: 0.17 CM
BH CV VAS MEAS RADIAL UPPER ARM RIGHT - DIST: 0.14 CM
BH CV VAS MEAS RADIAL UPPER ARM RIGHT - MID: 0.14 CM
BH CV VAS MEAS RADIAL UPPER ARM RIGHT - PROX: 0.13 CM
BUN SERPL-MCNC: 18 MG/DL (ref 6–20)
BUN/CREAT SERPL: 7.6 (ref 7–25)
CALCIUM SPEC-SCNC: 7.4 MG/DL (ref 8.6–10.5)
CHLORIDE SERPL-SCNC: 102 MMOL/L (ref 98–107)
CO2 SERPL-SCNC: 26 MMOL/L (ref 22–29)
CREAT SERPL-MCNC: 2.37 MG/DL (ref 0.57–1)
DEPRECATED RDW RBC AUTO: 45.5 FL (ref 37–54)
EOSINOPHIL # BLD AUTO: 0.12 10*3/MM3 (ref 0–0.4)
EOSINOPHIL NFR BLD AUTO: 1.1 % (ref 0.3–6.2)
ERYTHROCYTE [DISTWIDTH] IN BLOOD BY AUTOMATED COUNT: 15.2 % (ref 12.3–15.4)
GFR SERPL CREATININE-BSD FRML MDRD: 21 ML/MIN/1.73
GLUCOSE BLDC GLUCOMTR-MCNC: 110 MG/DL (ref 70–130)
GLUCOSE BLDC GLUCOMTR-MCNC: 136 MG/DL (ref 70–130)
GLUCOSE BLDC GLUCOMTR-MCNC: 137 MG/DL (ref 70–130)
GLUCOSE BLDC GLUCOMTR-MCNC: 160 MG/DL (ref 70–130)
GLUCOSE BLDC GLUCOMTR-MCNC: 167 MG/DL (ref 70–130)
GLUCOSE BLDC GLUCOMTR-MCNC: 56 MG/DL (ref 70–130)
GLUCOSE BLDC GLUCOMTR-MCNC: 65 MG/DL (ref 70–130)
GLUCOSE BLDC GLUCOMTR-MCNC: 68 MG/DL (ref 70–130)
GLUCOSE BLDC GLUCOMTR-MCNC: 72 MG/DL (ref 70–130)
GLUCOSE SERPL-MCNC: 173 MG/DL (ref 65–99)
HCT VFR BLD AUTO: 24 % (ref 34–46.6)
HGB BLD-MCNC: 8.2 G/DL (ref 12–15.9)
IMM GRANULOCYTES # BLD AUTO: 0.07 10*3/MM3 (ref 0–0.05)
IMM GRANULOCYTES NFR BLD AUTO: 0.6 % (ref 0–0.5)
LYMPHOCYTES # BLD AUTO: 1.88 10*3/MM3 (ref 0.7–3.1)
LYMPHOCYTES NFR BLD AUTO: 16.7 % (ref 19.6–45.3)
MAXIMAL PREDICTED HEART RATE: 164 BPM
MCH RBC QN AUTO: 28.3 PG (ref 26.6–33)
MCHC RBC AUTO-ENTMCNC: 34.2 G/DL (ref 31.5–35.7)
MCV RBC AUTO: 82.8 FL (ref 79–97)
MONOCYTES # BLD AUTO: 1.13 10*3/MM3 (ref 0.1–0.9)
MONOCYTES NFR BLD AUTO: 10 % (ref 5–12)
NEUTROPHILS NFR BLD AUTO: 71.2 % (ref 42.7–76)
NEUTROPHILS NFR BLD AUTO: 8.02 10*3/MM3 (ref 1.7–7)
NRBC BLD AUTO-RTO: 0 /100 WBC (ref 0–0.2)
PHOSPHATE SERPL-MCNC: 3.4 MG/DL (ref 2.5–4.5)
PLATELET # BLD AUTO: 239 10*3/MM3 (ref 140–450)
PMV BLD AUTO: 11.4 FL (ref 6–12)
POTASSIUM SERPL-SCNC: 3.7 MMOL/L (ref 3.5–5.2)
RBC # BLD AUTO: 2.9 10*6/MM3 (ref 3.77–5.28)
SODIUM SERPL-SCNC: 133 MMOL/L (ref 136–145)
STRESS TARGET HR: 139 BPM
UPPER ARTERIAL LEFT ARM BRACHIAL LENGTH: 0.33 CM
UPPER ARTERIAL RIGHT ARM BRACHIAL LENGTH: 0.36 CM
WBC NRBC COR # BLD: 11.26 10*3/MM3 (ref 3.4–10.8)

## 2021-12-08 PROCEDURE — 85025 COMPLETE CBC W/AUTO DIFF WBC: CPT | Performed by: STUDENT IN AN ORGANIZED HEALTH CARE EDUCATION/TRAINING PROGRAM

## 2021-12-08 PROCEDURE — 82962 GLUCOSE BLOOD TEST: CPT

## 2021-12-08 PROCEDURE — 63710000001 INSULIN GLARGINE PER 5 UNITS: Performed by: INTERNAL MEDICINE

## 2021-12-08 PROCEDURE — 63710000001 INSULIN LISPRO (HUMAN) PER 5 UNITS: Performed by: STUDENT IN AN ORGANIZED HEALTH CARE EDUCATION/TRAINING PROGRAM

## 2021-12-08 PROCEDURE — 93985 DUP-SCAN HEMO COMPL BI STD: CPT

## 2021-12-08 PROCEDURE — 25010000002 HEPARIN (PORCINE) PER 1000 UNITS: Performed by: INTERNAL MEDICINE

## 2021-12-08 PROCEDURE — 25010000002 CEFTRIAXONE PER 250 MG: Performed by: STUDENT IN AN ORGANIZED HEALTH CARE EDUCATION/TRAINING PROGRAM

## 2021-12-08 PROCEDURE — 99222 1ST HOSP IP/OBS MODERATE 55: CPT | Performed by: STUDENT IN AN ORGANIZED HEALTH CARE EDUCATION/TRAINING PROGRAM

## 2021-12-08 PROCEDURE — 63710000001 INSULIN LISPRO (HUMAN) PER 5 UNITS: Performed by: INTERNAL MEDICINE

## 2021-12-08 PROCEDURE — 80069 RENAL FUNCTION PANEL: CPT | Performed by: STUDENT IN AN ORGANIZED HEALTH CARE EDUCATION/TRAINING PROGRAM

## 2021-12-08 RX ORDER — OLANZAPINE 5 MG/1
5 TABLET ORAL 2 TIMES DAILY
Status: DISCONTINUED | OUTPATIENT
Start: 2021-12-08 | End: 2021-12-09

## 2021-12-08 RX ORDER — INSULIN GLARGINE 100 [IU]/ML
3 INJECTION, SOLUTION SUBCUTANEOUS EVERY MORNING
Status: DISCONTINUED | OUTPATIENT
Start: 2021-12-09 | End: 2021-12-09

## 2021-12-08 RX ORDER — HEPARIN SODIUM 5000 [USP'U]/ML
5000 INJECTION, SOLUTION INTRAVENOUS; SUBCUTANEOUS EVERY 8 HOURS SCHEDULED
Status: DISCONTINUED | OUTPATIENT
Start: 2021-12-08 | End: 2021-12-17 | Stop reason: HOSPADM

## 2021-12-08 RX ORDER — MULTIPLE VITAMINS W/ MINERALS TAB 9MG-400MCG
1 TAB ORAL DAILY
Status: DISCONTINUED | OUTPATIENT
Start: 2021-12-08 | End: 2021-12-17 | Stop reason: HOSPADM

## 2021-12-08 RX ORDER — TAMSULOSIN HYDROCHLORIDE 0.4 MG/1
0.4 CAPSULE ORAL DAILY
Status: DISCONTINUED | OUTPATIENT
Start: 2021-12-08 | End: 2021-12-17 | Stop reason: HOSPADM

## 2021-12-08 RX ADMIN — SODIUM BICARBONATE 1300 MG: 650 TABLET ORAL at 15:06

## 2021-12-08 RX ADMIN — HEPARIN SODIUM 5000 UNITS: 5000 INJECTION INTRAVENOUS; SUBCUTANEOUS at 21:50

## 2021-12-08 RX ADMIN — OLANZAPINE 2.5 MG: 2.5 TABLET ORAL at 00:20

## 2021-12-08 RX ADMIN — HYDRALAZINE HYDROCHLORIDE 25 MG: 50 TABLET, FILM COATED ORAL at 21:50

## 2021-12-08 RX ADMIN — OLANZAPINE 5 MG: 5 TABLET ORAL at 20:31

## 2021-12-08 RX ADMIN — Medication 250 MG: at 21:42

## 2021-12-08 RX ADMIN — SODIUM CHLORIDE, PRESERVATIVE FREE 10 ML: 5 INJECTION INTRAVENOUS at 09:31

## 2021-12-08 RX ADMIN — SODIUM CHLORIDE, PRESERVATIVE FREE 10 ML: 5 INJECTION INTRAVENOUS at 20:31

## 2021-12-08 RX ADMIN — HYDRALAZINE HYDROCHLORIDE 25 MG: 50 TABLET, FILM COATED ORAL at 06:31

## 2021-12-08 RX ADMIN — METOPROLOL TARTRATE 25 MG: 25 TABLET, FILM COATED ORAL at 09:30

## 2021-12-08 RX ADMIN — AMLODIPINE BESYLATE 5 MG: 5 TABLET ORAL at 09:30

## 2021-12-08 RX ADMIN — PANTOPRAZOLE SODIUM 40 MG: 40 TABLET, DELAYED RELEASE ORAL at 09:30

## 2021-12-08 RX ADMIN — OLANZAPINE 5 MG: 5 TABLET ORAL at 14:54

## 2021-12-08 RX ADMIN — INSULIN LISPRO 2 UNITS: 100 INJECTION, SOLUTION INTRAVENOUS; SUBCUTANEOUS at 09:31

## 2021-12-08 RX ADMIN — HYDRALAZINE HYDROCHLORIDE 25 MG: 50 TABLET, FILM COATED ORAL at 14:54

## 2021-12-08 RX ADMIN — DEXTROSE 15 G: 15 GEL ORAL at 15:02

## 2021-12-08 RX ADMIN — ROPINIROLE HYDROCHLORIDE 0.75 MG: 0.5 TABLET, FILM COATED ORAL at 20:30

## 2021-12-08 RX ADMIN — METOPROLOL TARTRATE 25 MG: 25 TABLET, FILM COATED ORAL at 20:30

## 2021-12-08 RX ADMIN — SODIUM BICARBONATE 1300 MG: 650 TABLET ORAL at 09:30

## 2021-12-08 RX ADMIN — SODIUM BICARBONATE 1300 MG: 650 TABLET ORAL at 20:31

## 2021-12-08 RX ADMIN — CEFTRIAXONE 1 G: 1 INJECTION, POWDER, FOR SOLUTION INTRAMUSCULAR; INTRAVENOUS at 20:30

## 2021-12-08 RX ADMIN — TAMSULOSIN HYDROCHLORIDE 0.4 MG: 0.4 CAPSULE ORAL at 14:54

## 2021-12-08 RX ADMIN — LABETALOL HYDROCHLORIDE 10 MG: 5 INJECTION, SOLUTION INTRAVENOUS at 12:46

## 2021-12-08 RX ADMIN — LINAGLIPTIN 5 MG: 5 TABLET, FILM COATED ORAL at 09:30

## 2021-12-08 RX ADMIN — INSULIN LISPRO 2 UNITS: 100 INJECTION, SOLUTION INTRAVENOUS; SUBCUTANEOUS at 09:30

## 2021-12-08 RX ADMIN — LEVOTHYROXINE SODIUM 175 MCG: 0.17 TABLET ORAL at 06:31

## 2021-12-08 RX ADMIN — SERTRALINE HYDROCHLORIDE 50 MG: 50 TABLET, FILM COATED ORAL at 09:30

## 2021-12-08 RX ADMIN — Medication 5 MG: at 20:31

## 2021-12-08 RX ADMIN — INSULIN GLARGINE 6 UNITS: 100 INJECTION, SOLUTION SUBCUTANEOUS at 06:31

## 2021-12-08 RX ADMIN — DEXTROSE MONOHYDRATE 25 G: 500 INJECTION PARENTERAL at 12:23

## 2021-12-08 RX ADMIN — MULTIPLE VITAMINS W/ MINERALS TAB 1 TABLET: TAB at 21:42

## 2021-12-08 RX ADMIN — FOLIC ACID 1 MG: 1 TABLET ORAL at 09:30

## 2021-12-08 RX ADMIN — HEPARIN SODIUM 5000 UNITS: 5000 INJECTION INTRAVENOUS; SUBCUTANEOUS at 14:54

## 2021-12-08 RX ADMIN — ASPIRIN 81 MG: 81 TABLET, CHEWABLE ORAL at 09:30

## 2021-12-08 NOTE — PLAN OF CARE
Goal Outcome Evaluation:  Plan of Care Reviewed With: patient        Progress: no change  Outcome Summary: BP elevated. Dialysis done today. Pt having visual and auditory halucinations. Dr. Alva aware. Changed medications. PRN zyprexa added for agitation. Will continue to monitor.

## 2021-12-08 NOTE — PROGRESS NOTES
"   LOS: 9 days    Patient Care Team:  Provider, No Known as PCP - General    Chief Complaint:    Chief Complaint   Patient presents with   • Altered Mental Status     Follow UP MAYUR CKD4  Subjective     Interval History:   She continues to complain of hallucination.  Agitated this morning.  No new issues.  Evaluated by neurology.  Dialyzed yesterday with no reported problems  Objective     Vital Signs  Temp:  [97.1 °F (36.2 °C)-98.4 °F (36.9 °C)] 97.1 °F (36.2 °C)  Heart Rate:  [61-69] 65  Resp:  [18] 18  BP: (131-185)/() 150/89    Flowsheet Rows      First Filed Value   Admission Height 157.5 cm (62\") Documented at 11/29/2021 2011   Admission Weight 59 kg (130 lb) Documented at 11/30/2021 0200          I/O this shift:  In: 360 [P.O.:360]  Out: 480 [Urine:480]  I/O last 3 completed shifts:  In: 930 [P.O.:930]  Out: 1080 [Urine:1080]    Intake/Output Summary (Last 24 hours) at 12/8/2021 1506  Last data filed at 12/8/2021 1455  Gross per 24 hour   Intake 480 ml   Output 960 ml   Net -480 ml       Physical Exam:  General Appearance: more alert today, no distress, +facial edema  Neck supple no JVD  Lungs CTA bilat no rales  CV RRR no m/g  abd soft NT/ND  vasc no pedal/ankle edema 2+ radial pulses                      Results Review:    Results from last 7 days   Lab Units 12/08/21  0552 12/07/21  0418 12/06/21  0343   SODIUM mmol/L 133* 129* 133*   POTASSIUM mmol/L 3.7 4.1 4.6   CHLORIDE mmol/L 102 99 102   CO2 mmol/L 26.0 17.5* 17.5*   BUN mg/dL 18 32* 49*   CREATININE mg/dL 2.37* 2.57* 3.89*   CALCIUM mg/dL 7.4* 7.2* 7.0*   GLUCOSE mg/dL 173* 302* 133*       Estimated Creatinine Clearance: 22.8 mL/min (A) (by C-G formula based on SCr of 2.37 mg/dL (H)).    Results from last 7 days   Lab Units 12/08/21  0552 12/07/21  0418 12/06/21  0343 12/05/21  0414 12/04/21  0604 12/03/21  1018 12/02/21  1147   MAGNESIUM mg/dL  --   --   --   --  1.4*  --  1.5*   PHOSPHORUS mg/dL 3.4 4.8* 5.1*   < > 5.4*   < > 5.6*    < > = " values in this interval not displayed.             Results from last 7 days   Lab Units 12/08/21  0552 12/07/21  0418 12/06/21  0343 12/05/21  0414 12/04/21  0604   WBC 10*3/mm3 11.26* 12.95* 12.85* 15.74* 12.86*   HEMOGLOBIN g/dL 8.2* 9.4* 8.7* 8.8* 8.6*   PLATELETS 10*3/mm3 239 274 250 225 201               Imaging Results (Last 24 Hours)     ** No results found for the last 24 hours. **        amLODIPine, 5 mg, Oral, Q24H  aspirin, 81 mg, Oral, Daily  cefTRIAXone, 1 g, Intravenous, Q24H  folic acid, 1 mg, Oral, Daily  heparin (porcine), 5,000 Units, Subcutaneous, Q8H  hydrALAZINE, 25 mg, Oral, Q8H  [START ON 12/9/2021] insulin glargine, 3 Units, Subcutaneous, QAM  insulin lispro, 0-7 Units, Subcutaneous, TID AC  insulin lispro, 2 Units, Subcutaneous, TID With Meals  levothyroxine, 175 mcg, Oral, Q AM  linagliptin, 5 mg, Oral, Daily  melatonin, 5 mg, Oral, Nightly  metoprolol tartrate, 25 mg, Oral, Q12H  OLANZapine, 5 mg, Oral, BID  pantoprazole, 40 mg, Oral, Daily  rOPINIRole, 0.75 mg, Oral, Nightly  sertraline, 50 mg, Oral, Daily  sodium bicarbonate, 1,300 mg, Oral, TID  sodium chloride, 10 mL, Intravenous, Q12H  tamsulosin, 0.4 mg, Oral, Daily      sodium chloride, 9 mL/hr, Last Rate: 9 mL/hr (12/06/21 1210)        Medication Review:   Current Facility-Administered Medications   Medication Dose Route Frequency Provider Last Rate Last Admin   • acetaminophen (TYLENOL) tablet 650 mg  650 mg Oral Q4H PRN Keli Salazar MD   650 mg at 12/07/21 1351   • amLODIPine (NORVASC) tablet 5 mg  5 mg Oral Q24H Keli Salazar MD   5 mg at 12/08/21 0930   • aspirin chewable tablet 81 mg  81 mg Oral Daily Keli Salazar MD   81 mg at 12/08/21 0930   • cefTRIAXone (ROCEPHIN) 1 g in sodium chloride 0.9 % 100 mL IVPB-VTB  1 g Intravenous Q24H Keli Salazar  mL/hr at 12/07/21 2126 1 g at 12/07/21 2126   • dextrose (D50W) (25 g/50 mL) IV injection 25 g  25 g Intravenous Q15 Min PRN Keli Salazar  MD   25 g at 12/08/21 1223   • dextrose (GLUTOSE) oral gel 15 g  15 g Oral Q15 Min PRN Keli Salazar MD   15 g at 12/08/21 1502   • folic acid (FOLVITE) tablet 1 mg  1 mg Oral Daily Keli Salazar MD   1 mg at 12/08/21 0930   • glucagon (human recombinant) (GLUCAGEN DIAGNOSTIC) injection 1 mg  1 mg Subcutaneous PRN Keil Salazar MD       • heparin (porcine) 5000 UNIT/ML injection 5,000 Units  5,000 Units Subcutaneous Q8H Osmar Alva MD   5,000 Units at 12/08/21 1454   • hydrALAZINE (APRESOLINE) tablet 25 mg  25 mg Oral Q8H Keli Salazar MD   25 mg at 12/08/21 1454   • HYDROcodone-acetaminophen (NORCO) 5-325 MG per tablet 1 tablet  1 tablet Oral Q6H PRN Keli Salazar MD       • [START ON 12/9/2021] insulin glargine (LANTUS, SEMGLEE) injection 3 Units  3 Units Subcutaneous QAM Osmar Alva MD       • insulin lispro (ADMELOG) injection 0-7 Units  0-7 Units Subcutaneous TID AC Keli Salazar MD   2 Units at 12/08/21 0930   • insulin lispro (ADMELOG) injection 2 Units  2 Units Subcutaneous TID With Meals Osmar Alva MD   2 Units at 12/08/21 0931   • labetalol (NORMODYNE,TRANDATE) injection 10 mg  10 mg Intravenous Q6H PRN Keli Salazar MD   10 mg at 12/08/21 1246   • levothyroxine (SYNTHROID, LEVOTHROID) tablet 175 mcg  175 mcg Oral Q AM Keli Salazar MD   175 mcg at 12/08/21 0631   • linagliptin (TRADJENTA) tablet 5 mg  5 mg Oral Daily Keli Salazar MD   5 mg at 12/08/21 0930   • loperamide (IMODIUM) capsule 2 mg  2 mg Oral 4x Daily PRN Keli Salazar MD   2 mg at 12/03/21 1519   • melatonin tablet 5 mg  5 mg Oral Nightly Osmar Alva MD   5 mg at 12/07/21 2126   • metoprolol tartrate (LOPRESSOR) tablet 25 mg  25 mg Oral Q12H Keli Salazar MD   25 mg at 12/08/21 0930   • nitroglycerin (NITROSTAT) SL tablet 0.4 mg  0.4 mg Sublingual Q5 Min PRN Keli Salazar MD       • OLANZapine (zyPREXA)  tablet 2.5 mg  2.5 mg Oral Q6H PRN Osmar Alva MD   2.5 mg at 12/08/21 0020   • OLANZapine (zyPREXA) tablet 5 mg  5 mg Oral BID Osmar Alva MD   5 mg at 12/08/21 1454   • pantoprazole (PROTONIX) EC tablet 40 mg  40 mg Oral Daily Keli Salazar MD   40 mg at 12/08/21 0930   • prochlorperazine (COMPAZINE) injection 5 mg  5 mg Intravenous Q6H PRN Keli Salazar MD       • rOPINIRole (REQUIP) tablet 0.75 mg  0.75 mg Oral Nightly Keli Salazar MD   0.75 mg at 12/07/21 2126   • sertraline (ZOLOFT) tablet 50 mg  50 mg Oral Daily Osmar Alva MD   50 mg at 12/08/21 0930   • sodium bicarbonate tablet 1,300 mg  1,300 mg Oral TID Keli Salazar MD   1,300 mg at 12/08/21 1506   • sodium chloride 0.9 % flush 10 mL  10 mL Intravenous Q12H Keli Salazar MD   10 mL at 12/08/21 0931   • sodium chloride 0.9 % flush 10 mL  10 mL Intravenous PRN Keli Salazar MD       • sodium chloride 0.9 % infusion  9 mL/hr Intravenous Continuous PRN Keli Salazar MD 9 mL/hr at 12/06/21 1210 9 mL/hr at 12/06/21 1210   • tamsulosin (FLOMAX) 24 hr capsule 0.4 mg  0.4 mg Oral Daily Osmar Alva MD   0.4 mg at 12/08/21 1454       Assessment/Plan   1.  MAYUR, CKD stage 4 - Cr down slightly 4.2 to 3.9 but has fluctuated 3.5 to 4.2 over stay and was notably 2.1 on 8/7/21 (vs 1.0 in 2019), overall picture more c/w progressive (albeit rapid) CKD (ie diabetic nephropathy) rather than MAYUR.  Sent off GN serologies but doubt will be revealing.  Has multiple uremic sx and eGFR likely worse than 12 mL/min predicted by MDRD based on scant muscle mass.  Only minimal left hydronephrosis on CT so doubt significant enough to be playing any role.  K 5.1.  Phos 5.5  2.  Facial swelling and confusion.  Probably due to myxedema from severe hypothyroidism.  In light of extremely elevated TSH, ARB as culprit for facial swelling is much less likely  3.  DM2 with poor control  4.   Anemia, hgb up slightly 8.7   5.  Hypertension, watch for over-correction  6.  Type II non-NSTEMI  7.  Severe hypothyroidism.  This is probably driving her mild hyponatremia as well  8.  Medication noncompliance  9.  Diarrhea, resolved   10.NAGMA -    Plan  - We will dialyze tomorrow   - surveillance labs         MAYUR (acute kidney injury) (HCC)    HTN (hypertension)    Hypothyroidism    Type 2 diabetes mellitus with hyperglycemia, with long-term current use of insulin (HCC)    Rheumatoid arthritis (HCC)    Angioedema    Anemia    Medically noncompliant    Myocardial infarction due to demand ischemia (HCC)    Enteritis    PRES (posterior reversible encephalopathy syndrome)    Urine retention              Reynaldo Sotelo MD  12/08/21  15:06 EST

## 2021-12-08 NOTE — PROGRESS NOTES
[facility] Medicine Services  PROGRESS NOTE    Patient Name: Zaina Martinez  : 1965  MRN: [id number]    Date of Admission: 2021  Primary Care Physician: Provider, No Known    Subjective   Subjective     CC:  Follow-up renal failure.    HPI:  Patient reportedly was climbing out of bed last night.  She has hallucinations.  Restraints were placed for her safety.  This morning she is quite confused and asking for a cookie.  She occasionally will answer some questions correctly but sometimes has difficulty answering questions.      Review of Systems  No current fevers or chills  No current shortness of breath or cough  No current nausea, vomiting, or diarrhea  No current chest pain or palpitations    Objective   Objective     Vital Signs:   Temp:  [97.2 °F (36.2 °C)-98.4 °F (36.9 °C)] 98.2 °F (36.8 °C)  Heart Rate:  [61-69] 61  Resp:  [16-18] 18  BP: (131-195)/() 151/83  Total (NIH Stroke Scale): 2     Physical Exam:  Constitutional:Awake, alert  HENT: NCAT, mucous membranes moist, neck supple  Respiratory: Clear to auscultation bilaterally, respiratory effort normal, nonlabored breathing   Cardiovascular: RRR, normal radial pulses  Gastrointestinal: Positive bowel sounds, soft, nontender, nondistended  Musculoskeletal: Normal musculature for age, no lower extremity edema, BMI 25  Psychiatric: Appropriate affect, cooperative, conversational  Neurologic: Oriented x3, no slurred speech or facial droop, follows commands  Skin: No rashes or jaundice, warm    Results Reviewed:  Results from last 7 days   Lab Units 2152 21   WBC 10*3/mm3 11.26* 12.95* 12.85*   HEMOGLOBIN g/dL 8.2* 9.4* 8.7*   HEMATOCRIT % 24.0* 27.8* 26.4*   PLATELETS 10*3/mm3 239 274 250     Results from last 7 days   Lab Units 21   SODIUM mmol/L 133* 129* 133*   POTASSIUM mmol/L 3.7 4.1 4.6   CHLORIDE mmol/L 102 99 102   CO2 mmol/L 26.0 17.5* 17.5*   BUN  mg/dL 18 32* 49*   CREATININE mg/dL 2.37* 2.57* 3.89*   GLUCOSE mg/dL 173* 302* 133*   CALCIUM mg/dL 7.4* 7.2* 7.0*     Estimated Creatinine Clearance: 22.8 mL/min (A) (by C-G formula based on SCr of 2.37 mg/dL (H)).    Microbiology Results Abnormal     Procedure Component Value - Date/Time    COVID PRE-OP / PRE-PROCEDURE SCREENING ORDER (NO ISOLATION) - Swab, Nasopharynx [[id number]]  (Normal) Collected: 12/06/21 0612    Lab Status: Final result Specimen: Swab from Nasopharynx Updated: 12/06/21 0648    Narrative:      The following orders were created for panel order COVID PRE-OP / PRE-PROCEDURE SCREENING ORDER (NO ISOLATION) - Swab, Nasopharynx.  Procedure                               Abnormality         Status                     ---------                               -----------         ------                     COVID-19,[facility] IN-HOUSE...[[id number]]  Normal              Final result                 Please view results for these tests on the individual orders.    COVID-19,[facility] IN-HOUSE CEPHEID/MARTY NP SWAB IN TRANSPORT MEDIA 8-12 HR TAT - Swab, Nasopharynx [[id number]]  (Normal) Collected: 12/06/21 0612    Lab Status: Final result Specimen: Swab from Nasopharynx Updated: 12/06/21 0648     COVID19 Not Detected    Narrative:      Fact sheet for providers: https://www.fda.gov/media/396869/download    Fact sheet for patients: https://www.fda.gov/media/490562/download    Test performed by PCR.    Clostridium Difficile Toxin, PCR - Stool, Per Rectum [[id number]]  (Normal) Collected: 12/02/21 0813    Lab Status: Final result Specimen: Stool from Per Rectum Updated: 12/02/21 0913     C. Difficile Toxins by PCR Negative    COVID PRE-OP / PRE-PROCEDURE SCREENING ORDER (NO ISOLATION) - Swab, Nasopharynx [[id number]]  (Normal) Collected: 11/29/21 2343    Lab Status: Final result Specimen: Swab from Nasopharynx Updated: 11/30/21 0253    Narrative:      The following orders were created for panel order COVID PRE-OP /  PRE-PROCEDURE SCREENING ORDER (NO ISOLATION) - Swab, Nasopharynx.  Procedure                               Abnormality         Status                     ---------                               -----------         ------                     COVID-19,APTIMA PANTHER(...[[id number]]  Normal              Final result                 Please view results for these tests on the individual orders.    COVID-19,APTIMA PANTHER(MED), NAZ, NP/OP SWAB IN UTM/VTM/SALINE TRANSPORT MEDIA,24 HR TAT - Swab, Nasopharynx [[id number]]  (Normal) Collected: 11/29/21 2693    Lab Status: Final result Specimen: Swab from Nasopharynx Updated: 11/30/21 0444     COVID19 Not Detected    Narrative:      Fact sheet for providers: https://www.fda.gov/media/186400/download     Fact sheet for patients: https://www.fda.gov/media/681782/download    Test performed by RT PCR.          Imaging Results (Last 24 Hours)     ** No results found for the last 24 hours. **          Results for orders placed during the hospital encounter of 11/29/21    Adult Transthoracic Echo Complete W/ Cont if Necessary Per Protocol    Interpretation Summary  · Calculated left ventricular EF = 61.8% Estimated left ventricular EF was in agreement with the calculated left ventricular EF. Left ventricular systolic function is normal.  · Left ventricular diastolic function is consistent with (grade II w/high LAP) pseudonormalization.  · The left atrial cavity is mildly dilated.  · Trace tricuspid valve regurgitation is present. Calculated right ventricular systolic pressure from tricuspid regurgitation is 21.4 mmHg.      I have reviewed the medications:  Scheduled Meds:amLODIPine, 5 mg, Oral, Q24H  aspirin, 81 mg, Oral, Daily  cefTRIAXone, 1 g, Intravenous, Q24H  folic acid, 1 mg, Oral, Daily  heparin (porcine), 5,000 Units, Subcutaneous, Q8H  hydrALAZINE, 25 mg, Oral, Q8H  [START ON 12/9/2021] insulin glargine, 3 Units, Subcutaneous, QAM  insulin lispro, 0-7 Units, Subcutaneous,  TID AC  insulin lispro, 2 Units, Subcutaneous, TID With Meals  levothyroxine, 175 mcg, Oral, Q AM  linagliptin, 5 mg, Oral, Daily  melatonin, 5 mg, Oral, Nightly  metoprolol tartrate, 25 mg, Oral, Q12H  OLANZapine, 5 mg, Oral, BID  pantoprazole, 40 mg, Oral, Daily  rOPINIRole, 0.75 mg, Oral, Nightly  sertraline, 50 mg, Oral, Daily  sodium bicarbonate, 1,300 mg, Oral, TID  sodium chloride, 10 mL, Intravenous, Q12H  tamsulosin, 0.4 mg, Oral, Daily      Continuous Infusions:sodium chloride, 9 mL/hr, Last Rate: 9 mL/hr (12/06/21 1210)      PRN Meds:.•  acetaminophen **OR** [DISCONTINUED] acetaminophen **OR** [DISCONTINUED] acetaminophen  •  dextrose  •  dextrose  •  glucagon (human recombinant)  •  HYDROcodone-acetaminophen  •  labetalol  •  loperamide  •  nitroglycerin  •  OLANZapine  •  prochlorperazine  •  sodium chloride  •  sodium chloride    Assessment/Plan   Assessment & Plan     Active Hospital Problems    Diagnosis  POA   • **MAYUR (acute kidney injury) (HCC) [N17.9]  Yes   • Urine retention [R33.9]  Yes   • PRES (posterior reversible encephalopathy syndrome) [I67.83]  Yes   • Enteritis [K52.9]  Clinically Undetermined   • Medically noncompliant [Z91.19]  Not Applicable   • Myocardial infarction due to demand ischemia (HCC) [I21.A1]  Yes   • Angioedema [T78.3XXA]  Yes   • Anemia [D64.9]  Yes   • HTN (hypertension) [I10]  Yes   • Hypothyroidism [E03.9]  Yes   • Type 2 diabetes mellitus with hyperglycemia, with long-term current use of insulin (HCC) [E11.65, Z79.4]  Not Applicable   • Rheumatoid arthritis (HCC) [M06.9]  Yes      Resolved Hospital Problems    Diagnosis Date Resolved POA   • Hypertensive urgency [I16.0] 11/30/2021 Yes        Brief Hospital Course to date:  Zaina Martinez is a 56 y.o. female presents with acute kidney injury, enteritis, hypothyroidism, and acute confusion felt to be related to PRES.       Discussion/plan for today:  Worsening altered mental status with hallucination noted.  Most likely  encephalopathy with delirium with severe insomnia however will ask neurology to evaluate for further recommendations.  Start scheduled Zyprexa twice daily to help with agitation and climbing out of bed.  I will try to get her off of restraints as soon as possible.  Continue treatments for insomnia.  Continue supportive care and symptom treatment.  Plan is for dialysis today.  Insulin further adjusted.     PRES appears to be resolving.  Patient is now oriented but is still a somewhat poor historian.  Neurology has signed off.  Continue monitoring orientation.    Attempting to avoid fluctuations of blood pressure if possible.     Hypothyroidism: TSH is severely elevated.  Restart home Synthroid.  Recommend she have free T4 and TSH within 1 month to continue to monitor.  Patient admits to missing her thyroid medication.  I explained to her that this is a very important medication.     Enteritis and UTI:  Abdominal symptoms resolved.  Completed course of ceftriaxone.     Diabetes: Monitor glucose and adjust as needed.  A1c 9.8.  Poorly controlled.  Start Tradjenta.  Correction insulin.     Demand ischemia: Medical management.  Cardiology has evaluated.  Normal echocardiogram.  Needs outpatient follow-up with cardiology.  She agrees with this plan.     Severe anemia:  Status post transfusion.  Hemoccult negative.  No active bleeding reported.  Normocytic.  Mostly consistent with anemia of chronic disease or chronic kidney disease.    B12 relatively normal.  Folic acid is borderline low.  Started Folic acid supplements.    Urine retention: Plan to trial Flomax.  Catheterize as needed.     DVT Prophylaxis: Mechanical      Disposition: Pending    CODE STATUS:   Code Status and Medical Interventions:   Ordered at: 11/30/21 0115     Code Status (Patient has no pulse and is not breathing):    CPR (Attempt to Resuscitate)     Medical Interventions (Patient has pulse or is breathing):    Full Support       Osmar Rivero  MD Artie  12/08/21

## 2021-12-08 NOTE — PLAN OF CARE
Goal Outcome Evaluation:  Plan of Care Reviewed With: patient        Progress: improving  Outcome Summary: Continue having visual and auditory halucinations, PRN zyprexa and scheduled meletonin does not help pt with sleep; pt tried to get off the bed on a average of every 10-15 mins. Called the MD, and Renee MELÉNDEZ ordered nonviolent restrain at around 0133 since pt already has multiple falls in hospital stay. Called pt's  and he is aware of the restrains (Non-violent ). Bladder scan 781ml this AM, straight cathed pt around 0500 and get 480ml out; re-hjyc=213zs. Restrain loosened and MOA provided q2H; VSS; will continue to monitor.

## 2021-12-08 NOTE — CONSULTS
"Neurology Consult Note    Consult Date: 12/8/2021    Referring MD: Osmar Alva*    Reason for Consult I have been asked to see the patient in neurological consultation to render advice and opinion regarding altered mental status with hallucination.    Zaina Martinez is a 56 y.o. right-handed white female has medical history of diabetes, thyroid disease, hypertension, rheumatoid arthritis who presented to the hospital with left facial swelling thought to be secondary to lisinopril, hypertensive emergency with T2 flair changes on the MRI brain thought to be related to press syndrome.  The patient also was found to have severe urinary tract infection with numerous urinary WBC and RBC with over 100,000 bacteria with Klebsiella pneumonia, currently patient on ceftriaxone.  Neurology had seen the patient and signed off and we have been asked to see the patient again for worsening altered mental status and visual hallucination.  On my assessment the patient was drowsy, oriented to self, year, month, she knew that this is December and that White coming up at the end of this month.  She knew the president.  She was disoriented to place and she thinks she is at home.  She was able to move all extremities equally with no focal finding.    Past Medical History:   Diagnosis Date   • Diabetes (HCC)    • Disease of thyroid gland    • GERD (gastroesophageal reflux disease)    • Hypertension    • Rheumatoid arthritis (HCC)        ROS:  Visual hallucination and altered mental status  No fevers, chills  No weakness, numbness  No diarrhea nausea or vomiting    Exam  /89 (BP Location: Right arm, Patient Position: Lying)   Pulse 64   Temp 97.1 °F (36.2 °C) (Oral)   Resp 18   Ht 157.5 cm (62\")   Wt 61.2 kg (135 lb)   SpO2 94%   BMI 24.69 kg/m²   Gen: Drowsy, vitals reviewed  MS: oriented x 2 to self and time but not to the place, poor attention/concentration, poor judgment, language intact, no neglect.  CN: " visual acuity grossly normal, PERRL, EOMI, no facial droop, no dysarthria  Motor: 5/5 throughout upper and lower extremities, normal tone  Sensory exam: Normal to light touch throughout  Reflexes: 2+ throughout with downgoing plantars bilaterally  Coordination and gait: Not assessed due to patient condition    DATA:    Lab Results   Component Value Date    GLUCOSE 173 (H) 12/08/2021    CALCIUM 7.4 (L) 12/08/2021     (L) 12/08/2021    K 3.7 12/08/2021    CO2 26.0 12/08/2021     12/08/2021    BUN 18 12/08/2021    CREATININE 2.37 (H) 12/08/2021    EGFRIFAFRI  12/06/2021      Comment:      <15 Indicative of kidney failure.    EGFRIFNONA 21 (L) 12/08/2021    BCR 7.6 12/08/2021    ANIONGAP 5.0 12/08/2021     Lab Results   Component Value Date    WBC 11.26 (H) 12/08/2021    HGB 8.2 (L) 12/08/2021    HCT 24.0 (L) 12/08/2021    MCV 82.8 12/08/2021     12/08/2021       Lab review: Lab review showed severe urinary tract infection, WBC 11.2, sodium 133, potassium 3.7, BUN 18, creatinine 2.37    Imaging review: I personally reviewed her MRI brain dated 12/4 which showed T2 flair hyperintensity consistent with press syndrome.    Diagnoses:  Altered mental status likely delirium secondary to metabolic encephalopathy and urinary tract infection  Press likely secondary to hypertensive emergency  Acute kidney injury  Essential hypertension  Hypothyroidism  Current tract infection      PLAN:   1.  Treat UTI and other metabolic abnormalities will defer to the primary team.  2.  Repeat MRI brain in 3 months to follow-up reevaluation of press  3.  Follow-up with Dr. Pitt in the clinic in 3 months after having the MRI brain repeated.  4.  Delirium precautions    Delirium precautions:    1. Frequent reorientation, reminding patient not questioning patient   2. Shades up during day/down at night   3. TV off except for soft music only   4. Familiar objects at bedside   5. Encourage friends/family to spend the night   6.  "Minimize anticholingeric medications   7. Minimize polypharmacy   8. Minimize restraints, includes lines and/or foleys and/or feeding tubes as able   9. Minimize overnight checks/vitals to encourage restful consistent sleep patterns   10. Out of bed during day as much as possible     No further work-up needed from neurology standpoint, will sign off and see again as needed.    \"Dictated utilizing Dragon dictation\".      "

## 2021-12-09 LAB
ALBUMIN SERPL-MCNC: 1.7 G/DL (ref 3.5–5.2)
AMMONIA BLD-SCNC: 10 UMOL/L (ref 11–51)
ANION GAP SERPL CALCULATED.3IONS-SCNC: 8.4 MMOL/L (ref 5–15)
BASOPHILS # BLD AUTO: 0.04 10*3/MM3 (ref 0–0.2)
BASOPHILS NFR BLD AUTO: 0.3 % (ref 0–1.5)
BUN SERPL-MCNC: 23 MG/DL (ref 6–20)
BUN/CREAT SERPL: 9 (ref 7–25)
CALCIUM SPEC-SCNC: 7.4 MG/DL (ref 8.6–10.5)
CHLORIDE SERPL-SCNC: 102 MMOL/L (ref 98–107)
CO2 SERPL-SCNC: 24.6 MMOL/L (ref 22–29)
CREAT SERPL-MCNC: 2.56 MG/DL (ref 0.57–1)
DEPRECATED RDW RBC AUTO: 47.3 FL (ref 37–54)
EOSINOPHIL # BLD AUTO: 0.12 10*3/MM3 (ref 0–0.4)
EOSINOPHIL NFR BLD AUTO: 1 % (ref 0.3–6.2)
ERYTHROCYTE [DISTWIDTH] IN BLOOD BY AUTOMATED COUNT: 15.6 % (ref 12.3–15.4)
GFR SERPL CREATININE-BSD FRML MDRD: 19 ML/MIN/1.73
GLUCOSE BLDC GLUCOMTR-MCNC: 111 MG/DL (ref 70–130)
GLUCOSE BLDC GLUCOMTR-MCNC: 122 MG/DL (ref 70–130)
GLUCOSE BLDC GLUCOMTR-MCNC: 129 MG/DL (ref 70–130)
GLUCOSE SERPL-MCNC: 109 MG/DL (ref 65–99)
HBV CORE AB SERPL QL IA: NEGATIVE
HCT VFR BLD AUTO: 24 % (ref 34–46.6)
HGB BLD-MCNC: 8.3 G/DL (ref 12–15.9)
IMM GRANULOCYTES # BLD AUTO: 0.06 10*3/MM3 (ref 0–0.05)
IMM GRANULOCYTES NFR BLD AUTO: 0.5 % (ref 0–0.5)
LYMPHOCYTES # BLD AUTO: 1.58 10*3/MM3 (ref 0.7–3.1)
LYMPHOCYTES NFR BLD AUTO: 13 % (ref 19.6–45.3)
MCH RBC QN AUTO: 28.8 PG (ref 26.6–33)
MCHC RBC AUTO-ENTMCNC: 34.6 G/DL (ref 31.5–35.7)
MCV RBC AUTO: 83.3 FL (ref 79–97)
MONOCYTES # BLD AUTO: 1.04 10*3/MM3 (ref 0.1–0.9)
MONOCYTES NFR BLD AUTO: 8.6 % (ref 5–12)
NEUTROPHILS NFR BLD AUTO: 76.6 % (ref 42.7–76)
NEUTROPHILS NFR BLD AUTO: 9.29 10*3/MM3 (ref 1.7–7)
NRBC BLD AUTO-RTO: 0 /100 WBC (ref 0–0.2)
PHOSPHATE SERPL-MCNC: 4 MG/DL (ref 2.5–4.5)
PLATELET # BLD AUTO: 257 10*3/MM3 (ref 140–450)
PMV BLD AUTO: 11.6 FL (ref 6–12)
POTASSIUM SERPL-SCNC: 4.1 MMOL/L (ref 3.5–5.2)
RBC # BLD AUTO: 2.88 10*6/MM3 (ref 3.77–5.28)
RPR SER QL: NORMAL
SODIUM SERPL-SCNC: 135 MMOL/L (ref 136–145)
T4 FREE SERPL-MCNC: 1.68 NG/DL (ref 0.93–1.7)
WBC NRBC COR # BLD: 12.13 10*3/MM3 (ref 3.4–10.8)

## 2021-12-09 PROCEDURE — 25010000002 HALOPERIDOL LACTATE PER 5 MG

## 2021-12-09 PROCEDURE — 80069 RENAL FUNCTION PANEL: CPT | Performed by: STUDENT IN AN ORGANIZED HEALTH CARE EDUCATION/TRAINING PROGRAM

## 2021-12-09 PROCEDURE — 84439 ASSAY OF FREE THYROXINE: CPT | Performed by: INTERNAL MEDICINE

## 2021-12-09 PROCEDURE — 97162 PT EVAL MOD COMPLEX 30 MIN: CPT

## 2021-12-09 PROCEDURE — 25010000002 HEPARIN (PORCINE) PER 1000 UNITS: Performed by: INTERNAL MEDICINE

## 2021-12-09 PROCEDURE — 97110 THERAPEUTIC EXERCISES: CPT

## 2021-12-09 PROCEDURE — 82962 GLUCOSE BLOOD TEST: CPT

## 2021-12-09 PROCEDURE — 82140 ASSAY OF AMMONIA: CPT | Performed by: INTERNAL MEDICINE

## 2021-12-09 PROCEDURE — 85025 COMPLETE CBC W/AUTO DIFF WBC: CPT | Performed by: STUDENT IN AN ORGANIZED HEALTH CARE EDUCATION/TRAINING PROGRAM

## 2021-12-09 RX ORDER — OLANZAPINE 10 MG/1
10 INJECTION, POWDER, LYOPHILIZED, FOR SOLUTION INTRAMUSCULAR ONCE
Status: COMPLETED | OUTPATIENT
Start: 2021-12-09 | End: 2021-12-09

## 2021-12-09 RX ORDER — HALOPERIDOL 5 MG/ML
1 INJECTION INTRAMUSCULAR ONCE
Status: COMPLETED | OUTPATIENT
Start: 2021-12-09 | End: 2021-12-09

## 2021-12-09 RX ORDER — ALBUMIN (HUMAN) 12.5 G/50ML
12.5 SOLUTION INTRAVENOUS AS NEEDED
Status: DISCONTINUED | OUTPATIENT
Start: 2021-12-09 | End: 2021-12-10

## 2021-12-09 RX ORDER — OLANZAPINE 10 MG/1
10 TABLET ORAL NIGHTLY
Status: DISCONTINUED | OUTPATIENT
Start: 2021-12-09 | End: 2021-12-13

## 2021-12-09 RX ORDER — ZIPRASIDONE MESYLATE 20 MG/ML
20 INJECTION, POWDER, LYOPHILIZED, FOR SOLUTION INTRAMUSCULAR ONCE
Status: COMPLETED | OUTPATIENT
Start: 2021-12-10 | End: 2021-12-10

## 2021-12-09 RX ORDER — OLANZAPINE 5 MG/1
5 TABLET ORAL DAILY
Status: DISCONTINUED | OUTPATIENT
Start: 2021-12-10 | End: 2021-12-13

## 2021-12-09 RX ADMIN — SODIUM CHLORIDE, PRESERVATIVE FREE 10 ML: 5 INJECTION INTRAVENOUS at 08:31

## 2021-12-09 RX ADMIN — METOPROLOL TARTRATE 25 MG: 25 TABLET, FILM COATED ORAL at 15:33

## 2021-12-09 RX ADMIN — AMLODIPINE BESYLATE 5 MG: 5 TABLET ORAL at 15:33

## 2021-12-09 RX ADMIN — PANTOPRAZOLE SODIUM 40 MG: 40 TABLET, DELAYED RELEASE ORAL at 15:33

## 2021-12-09 RX ADMIN — LEVOTHYROXINE SODIUM 175 MCG: 0.17 TABLET ORAL at 15:33

## 2021-12-09 RX ADMIN — OLANZAPINE 5 MG: 5 TABLET ORAL at 08:24

## 2021-12-09 RX ADMIN — FOLIC ACID 1 MG: 1 TABLET ORAL at 15:33

## 2021-12-09 RX ADMIN — HEPARIN SODIUM 5000 UNITS: 5000 INJECTION INTRAVENOUS; SUBCUTANEOUS at 06:34

## 2021-12-09 RX ADMIN — HYDRALAZINE HYDROCHLORIDE 25 MG: 50 TABLET, FILM COATED ORAL at 15:33

## 2021-12-09 RX ADMIN — OLANZAPINE 10 MG: 10 INJECTION, POWDER, FOR SOLUTION INTRAMUSCULAR at 18:09

## 2021-12-09 RX ADMIN — ASPIRIN 81 MG: 81 TABLET, CHEWABLE ORAL at 15:33

## 2021-12-09 RX ADMIN — LINAGLIPTIN 5 MG: 5 TABLET, FILM COATED ORAL at 15:33

## 2021-12-09 RX ADMIN — HEPARIN SODIUM 5000 UNITS: 5000 INJECTION INTRAVENOUS; SUBCUTANEOUS at 15:31

## 2021-12-09 RX ADMIN — MULTIPLE VITAMINS W/ MINERALS TAB 1 TABLET: TAB at 15:32

## 2021-12-09 RX ADMIN — HEPARIN SODIUM 5000 UNITS: 5000 INJECTION INTRAVENOUS; SUBCUTANEOUS at 21:08

## 2021-12-09 RX ADMIN — SODIUM BICARBONATE 1300 MG: 650 TABLET ORAL at 15:32

## 2021-12-09 RX ADMIN — TAMSULOSIN HYDROCHLORIDE 0.4 MG: 0.4 CAPSULE ORAL at 15:32

## 2021-12-09 RX ADMIN — Medication 250 MG: at 15:32

## 2021-12-09 RX ADMIN — SERTRALINE HYDROCHLORIDE 50 MG: 50 TABLET, FILM COATED ORAL at 15:32

## 2021-12-09 RX ADMIN — HALOPERIDOL LACTATE 1 MG: 5 INJECTION, SOLUTION INTRAMUSCULAR at 21:09

## 2021-12-09 RX ADMIN — SODIUM CHLORIDE, PRESERVATIVE FREE 10 ML: 5 INJECTION INTRAVENOUS at 21:10

## 2021-12-09 NOTE — PLAN OF CARE
Goal Outcome Evaluation:  Plan of Care Reviewed With: patient        Progress: no change  Outcome Summary: VSS; pt has been having frequent confusion episodes and completely oriented moments, neurology aware and assessed pt, singed off; BP was elevated, one dose of labetalol was given; BS was low, gave D50 and glucose gel, recheck >150; scheduled zyprexa added; doppler of arm was done; restraints in place; continue to monitor.

## 2021-12-09 NOTE — CASE MANAGEMENT/SOCIAL WORK
Continued Stay Note  [facility]     Patient Name: Zaina Martinez  MRN: [id number]  Today's Date: 12/9/2021    Admit Date: 11/29/2021     Discharge Plan     Row Name 12/09/21 1606       Plan    Plan Home with Spouse. Submitted for outpatient HD chair at [facility]. PT eval for multiple falls pending. Family to transport.    Patient/Family in Agreement with Plan yes    Plan Comments Spoke with [facility] HD clinic and updated on pt's condition. Stated they would call tomorrow to see if there is an anticipated D/C date so they can know when pt will start HD at center. Francisco J Patterson RN-BC               Discharge Codes    No documentation.               Expected Discharge Date and Time     Expected Discharge Date Expected Discharge Time    Dec 10, 2021             Francisco J Patterson RN

## 2021-12-09 NOTE — PLAN OF CARE
Problem: Adult Inpatient Plan of Care  Goal: Plan of Care Review  Outcome: Ongoing, Progressing  Flowsheets (Taken 12/9/2021 0570)  Progress: no change  Plan of Care Reviewed With: patient  Outcome Summary: VSS. Pt progressively more confused throughout night. Auditory and visual hallucinations. Restraints maintained and new order received by Giovani at 2230. Pt fell thoughout night- fall precautions in place and restraints. No signs of injury noted- Ami Islas aware- no new orders. Will continue to monitor.

## 2021-12-09 NOTE — THERAPY EVALUATION
Acute Care - Physical Therapy Initial Evaluation  [facility]     Patient Name: Zaina Martinez  : 1965  MRN: [id number]  Today's Date: 2021   Onset of Illness/Injury or Date of Surgery: 21  Visit Dx:     ICD-10-CM ICD-9-CM   1. Acute renal failure, unspecified acute renal failure type (HCC)  N17.9 584.9   2. Uncontrolled hypertension  I10 401.9   3. Anemia, unspecified type  D64.9 285.9   4. Elevated troponin  R77.8 790.6   5. Impaired mobility and activities of daily living  Z74.09 V49.89    Z78.9      Patient Active Problem List   Diagnosis   • MAYUR (acute kidney injury) ([facility])   • HTN (hypertension)   • Hypothyroidism   • Type 2 diabetes mellitus with hyperglycemia, with long-term current use of insulin ([facility])   • Rheumatoid arthritis ([facility])   • Angioedema   • GERD (gastroesophageal reflux disease)   • Anemia   • Medically noncompliant   • Myocardial infarction due to demand ischemia ([facility])   • Enteritis   • PRES (posterior reversible encephalopathy syndrome)   • Urine retention   • Klebsiella infection     Past Medical History:   Diagnosis Date   • Diabetes (HCC)    • Disease of thyroid gland    • GERD (gastroesophageal reflux disease)    • Hypertension    • Rheumatoid arthritis (HCC)      Past Surgical History:   Procedure Laterality Date   • EYE SURGERY     • HYSTERECTOMY     • INSERTION HEMODIALYSIS CATHETER N/A 2021    Procedure: HEMODIALYSIS CATHETER INSERTION;  Surgeon: Keli Salazar MD;  Location: [facility] ;  Service: Vascular;  Laterality: N/A;     PT Assessment (last 12 hours)     PT Evaluation and Treatment     Row Name 21 1533          Physical Therapy Time and Intention    Subjective Information no complaints  -LH     Document Type evaluation  -LH     Mode of Treatment individual therapy; physical therapy  -LH     Patient Effort good  -LH     Symptoms Noted During/After Treatment none  -LH     Row Name 21 1539          General Information    Patient  Profile Reviewed yes  -     Onset of Illness/Injury or Date of Surgery 11/29/21  -     Referring Physician Artie  -     Patient Observations agree to therapy; decreased LOC  -     General Observations of Patient pt supine in bed, nsg aide bedside, wrist restraints in place  -     Prior Level of Function independent:  -     Equipment Currently Used at Home cane, straight  -     Pertinent History of Current Functional Problem PRES  -     Existing Precautions/Restrictions fall  -     Risks Reviewed patient:  -     Benefits Reviewed patient:  -     Barriers to Rehab cognitive status; medically complex  -     Row Name 12/09/21 1533          Living Environment    Current Living Arrangements home/apartment/condo  -     Home Accessibility stairs to enter home  -     Lives With spouse  -     Row Name 12/09/21 1533          Home Main Entrance    Number of Stairs, Main Entrance three  -     Stair Railings, Main Entrance none  -     Row Name 12/09/21 1533          Cognition    Affect/Mental Status (Cognitive) confused  -     Behavioral Issues (Cognitive) overwhelmed easily  -     Orientation Status (Cognition) unable/difficult to assess  -     Follows Commands (Cognition) follows one-step commands; 0-24% accuracy; initiation impaired; physical/tactile prompts required; repetition of directions required; verbal cues/prompting required  -     Personal Safety Interventions fall prevention program maintained; gait belt; supervised activity  -     Row Name 12/09/21 1533          Pain Scale: Numbers Pre/Post-Treatment    Pretreatment Pain Rating 0/10 - no pain  -     Posttreatment Pain Rating 0/10 - no pain  -     Row Name 12/09/21 1533          Range of Motion Comprehensive    General Range of Motion bilateral lower extremity ROM WFL  -     Row Name 12/09/21 1533          Strength (Manual Muscle Testing)    Strength (Manual Muscle Testing) strength is WFL; bilateral lower  extremities  -     Row Name 12/09/21 1533          Bed Mobility    Bed Mobility supine-sit; sit-supine  -     Supine-Sit [place] (Bed Mobility) minimum assist (75% patient effort); 2 person assist; verbal cues; nonverbal cues (demo/gesture)  -     Sit-Supine [place] (Bed Mobility) 2 person assist; verbal cues; nonverbal cues (demo/gesture); moderate assist (50% patient effort)  -     Assistive Device (Bed Mobility) draw sheet; head of bed elevated  -[facility] Name 12/09/21 1533          Transfers    Transfers bed-chair transfer; chair-bed transfer  -     Comment (Transfers) HHA, pivot bed ->chair->bed (nsg changed out pt beds while PT working w pt)  -     Bed-Chair [place] (Transfers) minimum assist (75% patient effort); 2 person assist; verbal cues; nonverbal cues (demo/gesture); moderate assist (50% patient effort)  -     Chair-Bed [place] (Transfers) minimum assist (75% patient effort); moderate assist (50% patient effort); 2 person assist; verbal cues; nonverbal cues (demo/gesture)  -[facility] Name 12/09/21 1533          Gait/Stairs (Locomotion)    Comment (Gait/Stairs) several unsteady shuffled steps bed to chair back to bed  -     Row Name 12/09/21 1533          Balance    Balance Assessment sitting static balance; standing static balance  [facility]     Static Sitting Balance severe impairment; supported; moderate impairment; sitting, edge of bed  -     Static Standing Balance moderate impairment; supported; standing; asymmetrical weight shifting  HHAx 2  -     Row Name             Wound 12/06/21 1330 Right throat Puncture    Wound - Properties Group Placement Date: 12/06/21  - Placement Time: 1330  -AC Present on Hospital Admission: N  -AC Side: Right  -AC Location: throat  -AC Primary Wound Type: Puncture  -AC     Retired Wound - Properties Group Date first assessed: 12/06/21  - Time first assessed: 1330  -AC Present on Hospital Admission: N  -AC Side: Right  -AC  "Location: throat  -AC Primary Wound Type: Puncture  -AC     Row Name 12/09/21 1533          Plan of Care Review    Plan of Care Reviewed With patient  -LH     Outcome Summary Pt 57 yo female admitted from home w confusion family thought pt was \"having a stroke\" PMH HTN, DM, noted PRES. Upon PT assessment pt confused, appears to have visual halluncations, req'ed Min/MOd A x 2 HHA to pivot OOB (nsg changed pt bed out while PT bedside w pt), poor static sitting and standing balance, max Vcs for pt to keep EO. Pt may benefit from skilled PT acutely to address functional deficits and assist w DC planning.  -     Row Name 12/09/21 1533          Physical Therapy Goals    Bed Mobility Goal Selection (PT) bed mobility, PT goal 1  -     Transfer Goal Selection (PT) transfer, PT goal 1  -     Gait Training Goal Selection (PT) gait training, PT goal 1  -     Row Name 12/09/21 1533          Bed Mobility Goal 1 (PT)    Activity/Assistive Device (Bed Mobility Goal 1, PT) bed mobility activities, all  -LH     Park Level/Cues Needed (Bed Mobility Goal 1, PT) contact guard assist  -     Time Frame (Bed Mobility Goal 1, PT) 2 weeks  -     Row Name 12/09/21 1533          Transfer Goal 1 (PT)    Activity/Assistive Device (Transfer Goal 1, PT) sit-to-stand/stand-to-sit; bed-to-chair/chair-to-bed; walker, rolling  -LH     Park Level/Cues Needed (Transfer Goal 1, PT) contact guard assist  -     Time Frame (Transfer Goal 1, PT) 2 weeks  -     Row Name 12/09/21 1533          Gait Training Goal 1 (PT)    Activity/Assistive Device (Gait Training Goal 1, PT) assistive device use; walker, rolling  -LH     Park Level (Gait Training Goal 1, PT) contact guard assist  -LH     Distance (Gait Training Goal 1, PT) 150  -LH     Time Frame (Gait Training Goal 1, PT) 2 weeks  -     Row Name 12/09/21 1533          Positioning and Restraints    Pre-Treatment Position in bed  -LH     Post Treatment Position bed  -  "    In Bed supine; notified nsg; call light within reach; encouraged to call for assist; exit alarm on; with nsg  -     Restraints reapplied:; soft limb; notified nsg:  -     Row Name 12/09/21 1533          Therapy Assessment/Plan (PT)    Rehab Potential (PT) fair, will monitor progress closely  -     Criteria for Skilled Interventions Met (PT) yes  -     Row Name 12/09/21 1533          PT Evaluation Complexity    History, PT Evaluation Complexity 1-2 personal factors and/or comorbidities  -     Examination of Body Systems (PT Eval Complexity) total of 3 or more elements  -     Clinical Presentation (PT Evaluation Complexity) evolving  -     Clinical Decision Making (PT Evaluation Complexity) moderate complexity  -     Overall Complexity (PT Evaluation Complexity) moderate complexity  -           User Key  (r) = Recorded By, (t) = Taken By, (c) = Cosigned By    Initials Name Provider Type     Liz Fu, PT Physical Therapist    Miryam Simms, RN Registered Nurse                Physical Therapy Education                 Title: PT OT SLP Therapies (In Progress)     Topic: Physical Therapy (In Progress)     Point: Mobility training (In Progress)     Learning Progress Summary           Patient Acceptance, E, NR by  at 12/9/2021 1543                   Point: Home exercise program (In Progress)     Learning Progress Summary           Patient Acceptance, E, NR by  at 12/9/2021 1543                   Point: Body mechanics (In Progress)     Learning Progress Summary           Patient Acceptance, E, NR by  at 12/9/2021 1543                   Point: Precautions (In Progress)     Learning Progress Summary           Patient Acceptance, E, NR by  at 12/9/2021 1543                               User Key     Initials Effective Dates Name Provider Type Discipline     06/16/21 -  Liz Fu, PT Physical Therapist PT              PT Recommendation and Plan  Anticipated Discharge Disposition  "(PT): skilled nursing facility, inpatient rehabilitation facility  Planned Therapy Interventions (PT): balance training, bed mobility training, gait training, home exercise program, ROM (range of motion), stair training, strengthening, stretching, transfer training  Therapy Frequency (PT): 3 times/wk (inc freq if pt more appropriate)  Plan of Care Reviewed With: patient  Outcome Summary: Pt 57 yo female admitted from home w confusion family thought pt was \"having a stroke\" PMH HTN, DM, noted PRES. Upon PT assessment pt confused, appears to have visual halluncations, req'ed Min/MOd A x 2 HHA to pivot OOB (nsg changed pt bed out while PT bedside w pt), poor static sitting and standing balance, max Vcs for pt to keep EO. Pt may benefit from skilled PT acutely to address functional deficits and assist w DC planning.   Outcome Measures     Row Name 12/09/21 1500             How much help from another person do you currently need...    Turning from your back to your side while in flat bed without using bedrails? 2  -LH      Moving from lying on back to sitting on the side of a flat bed without bedrails? 2  -LH      Moving to and from a bed to a chair (including a wheelchair)? 2  -LH      Standing up from a chair using your arms (e.g., wheelchair, bedside chair)? 2  -LH      Climbing 3-5 steps with a railing? 1  -LH      To walk in hospital room? 1  -      AM-PAC 6 Clicks Score (PT) 10  -              Functional Assessment    Outcome Measure Options AM-PAC 6 Clicks Basic Mobility (PT)  -            User Key  (r) = Recorded By, (t) = Taken By, (c) = Cosigned By    Initials Name Provider Type     Liz Fu, PT Physical Therapist                 Time Calculation:    PT Charges     Row Name 12/09/21 1545             Time Calculation    Start Time 1500  -      Stop Time 1515  -      Time Calculation (min) 15 min  -      PT Received On 12/09/21  -      PT - Next Appointment 12/10/21  -      PT Goal Re-Cert " Due Date 12/23/21  -              Time Calculation- PT    Total Timed Code Minutes- PT 8 minute(s)  -            User Key  (r) = Recorded By, (t) = Taken By, (c) = Cosigned By    Initials Name Provider Type     Liz Fu, PT Physical Therapist              Therapy Charges for Today     Code Description Service Date Service Provider Modifiers Qty    94433972973 HC PT EVAL MOD COMPLEXITY 3 12/9/2021 Liz Fu, PT GP 1    14599720557 HC PT THER PROC EA 15 MIN 12/9/2021 Liz Fu, PT GP 1    80478848715 HC PT THER SUPP EA 15 MIN 12/9/2021 Liz Fu, PT GP 1          PT G-Codes  Outcome Measure Options: AM-PAC 6 Clicks Basic Mobility (PT)  AM-PAC 6 Clicks Score (PT): 10    Liz Fu, PT  12/9/2021

## 2021-12-09 NOTE — PLAN OF CARE
"Goal Outcome Evaluation:  Plan of Care Reviewed With: patient           Outcome Summary: Pt 55 yo female admitted from home w confusion family thought pt was \"having a stroke\" PMH HTN, DM, noted PRES. Pt baseline pt indep uses STC as needed, 3 SULEMA no HR. Upon PT assessment pt confused, appears to have visual halluncations, req'ed Min/MOd A x 2 HHA to pivot OOB (nsg changed pt bed out while PT bedside w pt), poor static sitting and standing balance, max Vcs for pt to keep EO. Pt may benefit from skilled PT acutely to address functional deficits and assist w DC planning.    ..Patient was not wearing a face mask during this therapy encounter. Therapist used appropriate personal protective equipment including eye protection, mask, and gloves.  Mask used was standard procedure mask. Appropriate PPE was worn during the entire therapy session. Hand hygiene was completed before and after therapy session. Patient is not in enhanced droplet precautions.     "

## 2021-12-09 NOTE — PROGRESS NOTES
[facility] Medicine Services  PROGRESS NOTE    Patient Name: Zania Martinez  : 1965  MRN: [id number]    Date of Admission: 2021  Primary Care Physician: Provider, No Known    Subjective   Subjective     CC:  Follow-up renal failure.    HPI:  Patient is agitated and trying to climb out of bed.  She is talking to people that are not there.  When I ask her orientation questions she knows her name, age, location, and the month and year.  In between our conversation she tries to talk to other people.  She tells me she knows they are not real.  She denies any other new complaints or pain.    Review of Systems  No current fevers or chills  No current shortness of breath or cough  No current nausea, vomiting, or diarrhea  No current chest pain or palpitations    Objective   Objective     Vital Signs:   Temp:  [97.1 °F (36.2 °C)-98.1 °F (36.7 °C)] 98 °F (36.7 °C)  Heart Rate:  [62-68] 66  Resp:  [14-18] 14  BP: (116-185)/() 144/69  Total (NIH Stroke Scale): 2     Physical Exam:  Constitutional:Awake, alert  HENT: NCAT, mucous membranes moist, neck supple  Respiratory: Clear to auscultation bilaterally, respiratory effort normal, nonlabored breathing   Cardiovascular: RRR, normal radial pulses  Gastrointestinal: Positive bowel sounds, soft, nontender, nondistended  Musculoskeletal: Normal musculature for age, minimal lower extremity edema, BMI 25  Psychiatric: Agitated and hallucinating affect, cooperative, conversational  Neurologic: no slurred speech or facial droop, follows commands  Skin: No rashes or jaundice, warm    Results Reviewed:  Results from last 7 days   Lab Units 21  0618 21  0552 218   WBC 10*3/mm3 12.13* 11.26* 12.95*   HEMOGLOBIN g/dL 8.3* 8.2* 9.4*   HEMATOCRIT % 24.0* 24.0* 27.8*   PLATELETS 10*3/mm3 257 239 274     Results from last 7 days   Lab Units 21  0618 21  0552 218   SODIUM mmol/L 135* 133* 129*   POTASSIUM mmol/L 4.1 3.7 4.1    CHLORIDE mmol/L 102 102 99   CO2 mmol/L 24.6 26.0 17.5*   BUN mg/dL 23* 18 32*   CREATININE mg/dL 2.56* 2.37* 2.57*   GLUCOSE mg/dL 109* 173* 302*   CALCIUM mg/dL 7.4* 7.4* 7.2*     Estimated Creatinine Clearance: 21.1 mL/min (A) (by C-G formula based on SCr of 2.56 mg/dL (H)).    Microbiology Results Abnormal     Procedure Component Value - Date/Time    COVID PRE-OP / PRE-PROCEDURE SCREENING ORDER (NO ISOLATION) - Swab, Nasopharynx [[id number]]  (Normal) Collected: 12/06/21 0612    Lab Status: Final result Specimen: Swab from Nasopharynx Updated: 12/06/21 0648    Narrative:      The following orders were created for panel order COVID PRE-OP / PRE-PROCEDURE SCREENING ORDER (NO ISOLATION) - Swab, Nasopharynx.  Procedure                               Abnormality         Status                     ---------                               -----------         ------                     COVID-19,[facility] IN-HOUSE...[[id number]]  Normal              Final result                 Please view results for these tests on the individual orders.    COVID-19,[facility] IN-HOUSE CEPHEID/MARTY NP SWAB IN TRANSPORT MEDIA 8-12 HR TAT - Swab, Nasopharynx [[id number]]  (Normal) Collected: 12/06/21 0612    Lab Status: Final result Specimen: Swab from Nasopharynx Updated: 12/06/21 0648     COVID19 Not Detected    Narrative:      Fact sheet for providers: https://www.fda.gov/media/368216/download    Fact sheet for patients: https://www.fda.gov/media/064650/download    Test performed by PCR.    Clostridium Difficile Toxin, PCR - Stool, Per Rectum [[id number]]  (Normal) Collected: 12/02/21 0813    Lab Status: Final result Specimen: Stool from Per Rectum Updated: 12/02/21 0913     C. Difficile Toxins by PCR Negative    COVID PRE-OP / PRE-PROCEDURE SCREENING ORDER (NO ISOLATION) - Swab, Nasopharynx [[id number]]  (Normal) Collected: 11/29/21 2068    Lab Status: Final result Specimen: Swab from Nasopharynx Updated: 11/30/21 0444    Narrative:      The  following orders were created for panel order COVID PRE-OP / PRE-PROCEDURE SCREENING ORDER (NO ISOLATION) - Swab, Nasopharynx.  Procedure                               Abnormality         Status                     ---------                               -----------         ------                     COVID-19,APTIMA PANTHER(...[[id number]]  Normal              Final result                 Please view results for these tests on the individual orders.    COVID-19,APTIMA PANTHER(MED), NAZ, NP/OP SWAB IN UTM/VTM/SALINE TRANSPORT MEDIA,24 HR TAT - Swab, Nasopharynx [[id number]]  (Normal) Collected: 11/29/21 2343    Lab Status: Final result Specimen: Swab from Nasopharynx Updated: 11/30/21 0444     COVID19 Not Detected    Narrative:      Fact sheet for providers: https://www.fda.gov/media/538718/download     Fact sheet for patients: https://www.fda.gov/media/273338/download    Test performed by RT PCR.          Imaging Results (Last 24 Hours)     ** No results found for the last 24 hours. **          Results for orders placed during the hospital encounter of 11/29/21    Adult Transthoracic Echo Complete W/ Cont if Necessary Per Protocol    Interpretation Summary  · Calculated left ventricular EF = 61.8% Estimated left ventricular EF was in agreement with the calculated left ventricular EF. Left ventricular systolic function is normal.  · Left ventricular diastolic function is consistent with (grade II w/high LAP) pseudonormalization.  · The left atrial cavity is mildly dilated.  · Trace tricuspid valve regurgitation is present. Calculated right ventricular systolic pressure from tricuspid regurgitation is 21.4 mmHg.      I have reviewed the medications:  Scheduled Meds:amLODIPine, 5 mg, Oral, Q24H  aspirin, 81 mg, Oral, Daily  folic acid, 1 mg, Oral, Daily  heparin (porcine), 5,000 Units, Subcutaneous, Q8H  hydrALAZINE, 25 mg, Oral, Q8H  insulin glargine, 3 Units, Subcutaneous, QAM  insulin lispro, 0-7 Units,  Subcutaneous, TID AC  insulin lispro, 2 Units, Subcutaneous, TID With Meals  levothyroxine, 175 mcg, Oral, Q AM  linagliptin, 5 mg, Oral, Daily  melatonin, 5 mg, Oral, Nightly  metoprolol tartrate, 25 mg, Oral, Q12H  multivitamin with minerals, 1 tablet, Oral, Daily  OLANZapine, 5 mg, Oral, BID  pantoprazole, 40 mg, Oral, Daily  rOPINIRole, 0.75 mg, Oral, Nightly  sertraline, 50 mg, Oral, Daily  sodium bicarbonate, 1,300 mg, Oral, TID  sodium chloride, 10 mL, Intravenous, Q12H  tamsulosin, 0.4 mg, Oral, Daily  thiamine, 250 mg, Oral, Daily      Continuous Infusions:sodium chloride, 9 mL/hr, Last Rate: 9 mL/hr (12/06/21 1210)      PRN Meds:.•  acetaminophen **OR** [DISCONTINUED] acetaminophen **OR** [DISCONTINUED] acetaminophen  •  albumin human  •  albumin human  •  albumin human  •  dextrose  •  dextrose  •  glucagon (human recombinant)  •  HYDROcodone-acetaminophen  •  labetalol  •  loperamide  •  nitroglycerin  •  OLANZapine  •  prochlorperazine  •  sodium chloride  •  sodium chloride    Assessment/Plan   Assessment & Plan     Active Hospital Problems    Diagnosis  POA   • **MAYUR (acute kidney injury) (HCC) [N17.9]  Yes   • Urine retention [R33.9]  Yes   • Klebsiella infection [A49.8]  Yes   • PRES (posterior reversible encephalopathy syndrome) [I67.83]  Yes   • Enteritis [K52.9]  Clinically Undetermined   • Medically noncompliant [Z91.19]  Not Applicable   • Myocardial infarction due to demand ischemia (HCC) [I21.A1]  Yes   • Angioedema [T78.3XXA]  Yes   • Anemia [D64.9]  Yes   • HTN (hypertension) [I10]  Yes   • Hypothyroidism [E03.9]  Yes   • Type 2 diabetes mellitus with hyperglycemia, with long-term current use of insulin (HCC) [E11.65, Z79.4]  Not Applicable   • Rheumatoid arthritis (HCC) [M06.9]  Yes      Resolved Hospital Problems    Diagnosis Date Resolved POA   • Hypertensive urgency [I16.0] 11/30/2021 Yes        Brief Hospital Course to date:  Zaina Martinez is a 56 y.o. female presents with acute kidney  injury, enteritis, hypothyroidism, and acute confusion felt to be related to PRES.       Discussion/plan for today:  Continue restraints.  Patient was able to partially climb out of bed last night despite soft limb restraints and bed alarm.  Nursing are continuing to work on this.  Increase Zyprexa from 5 mg twice daily to 5 mg in the morning and 10 mg at night.  Consult psychiatry for assistance with management of psychosis and hallucinations.  Continue restraints for now for patient safety.  PRES  was diagnosed by neurology earlier in the hospital stay.  It is unclear how much this is contributing.  Attempting to avoid fluctuations of blood pressure if possible.  Recheck free T4.  Insulin adjusted today.     Renal failure:  Now on dialysis, new this admission.    PRES:   Diagnosed by neurology on MRI.  Working to gently regulate blood pressure.    Hypothyroidism: TSH is severely elevated.  Restart home Synthroid.  Recommend she have free T4 and TSH within 1 month to continue to monitor.  Patient admits to missing her thyroid medication.  I explained to her that this is a very important medication.     Enteritis and UTI:  Abdominal symptoms resolved.  Completed course of ceftriaxone.     Diabetes: Monitor glucose and adjust as needed.  A1c 9.8.  Poorly controlled.  Started Tradjenta.  Correction insulin.     Demand ischemia: Medical management.  Cardiology has evaluated.  Normal echocardiogram.  Needs outpatient follow-up with cardiology.  She agrees with this plan.     Severe anemia:  Status post transfusion.  Hemoccult negative.  No active bleeding reported.  Normocytic.  Mostly consistent with anemia of chronic disease or chronic kidney disease.    B12 relatively normal.  Folic acid is borderline low.  Started Folic acid supplements.    Urine retention: Plan to trial Flomax.  Catheterize as needed.     DVT Prophylaxis: Mechanical    Disposition: Pending    CODE STATUS:   Code Status and Medical Interventions:    Ordered at: 11/30/21 0115     Code Status (Patient has no pulse and is not breathing):    CPR (Attempt to Resuscitate)     Medical Interventions (Patient has pulse or is breathing):    Full Support       Osmar Alva MD  12/09/21

## 2021-12-09 NOTE — NURSING NOTE
LATE ENTRY: 1310  HD WITHOUT INCIDENT OR C/O. TOLERATED DIALYSIS WITHOUT INCIDENT.  PATIENT ORIENTED AND ANSWERS ALL QUESTIONS APPROPRIATELY, HOWEVER HAS CONVERSATIONS WITH PEOPLE NOT THERE, HALLUCINATING, IRRATIONAL THOUGHT PROCESSES.  UNABLE TO EDUCATE OR REASON WITH HER THE REALITY OF SITUATION. IN BILAT SWR AND CONTINUED TO ATTEMPT TO GET OUT OF BED. SHE WAS ABLE TO COMPLETE HD.  REMOVED 2 L ON HD. NO MEDS GIVEN. DRSG CURRENT, CDI WITH BIOPATCH. STABLE , NO C/O UPON COMPLETION OF TREATMENT AND RETURN TO ROOM.

## 2021-12-09 NOTE — PROGRESS NOTES
"   LOS: 10 days    Patient Care Team:  Provider, No Known as PCP - General    Chief Complaint:    Chief Complaint   Patient presents with   • Altered Mental Status     Follow UP MAYUR CKD4  Subjective     Interval History:   Seen on dialysis today.  Continues to be agitated.  Objective     Vital Signs  Temp:  [97.7 °F (36.5 °C)-98.5 °F (36.9 °C)] 97.7 °F (36.5 °C)  Heart Rate:  [62-71] 71  Resp:  [14-18] 16  BP: (116-154)/(69-92) 143/80    Flowsheet Rows      First Filed Value   Admission Height 157.5 cm (62\") Documented at 11/29/2021 2011   Admission Weight 59 kg (130 lb) Documented at 11/30/2021 0200          I/O this shift:  In: 236 [P.O.:236]  Out: 2000 [Other:2000]  I/O last 3 completed shifts:  In: 600 [P.O.:600]  Out: 960 [Urine:960]    Intake/Output Summary (Last 24 hours) at 12/9/2021 1754  Last data filed at 12/9/2021 1300  Gross per 24 hour   Intake 476 ml   Output 2000 ml   Net -1524 ml       Physical Exam:  General Appearance: more alert today, no distress, +facial edema  Neck supple no JVD  Lungs CTA bilat no rales  CV RRR no m/g  abd soft NT/ND  vasc trace to 1+ pedal/ankle edema 2+ radial pulses                      Results Review:    Results from last 7 days   Lab Units 12/09/21 0618 12/08/21  0552 12/07/21  0418   SODIUM mmol/L 135* 133* 129*   POTASSIUM mmol/L 4.1 3.7 4.1   CHLORIDE mmol/L 102 102 99   CO2 mmol/L 24.6 26.0 17.5*   BUN mg/dL 23* 18 32*   CREATININE mg/dL 2.56* 2.37* 2.57*   CALCIUM mg/dL 7.4* 7.4* 7.2*   GLUCOSE mg/dL 109* 173* 302*       Estimated Creatinine Clearance: 21.1 mL/min (A) (by C-G formula based on SCr of 2.56 mg/dL (H)).    Results from last 7 days   Lab Units 12/09/21 0618 12/08/21  0552 12/07/21  0418 12/05/21  0414 12/04/21  0604   MAGNESIUM mg/dL  --   --   --   --  1.4*   PHOSPHORUS mg/dL 4.0 3.4 4.8*   < > 5.4*    < > = values in this interval not displayed.             Results from last 7 days   Lab Units 12/09/21 0618 12/08/21  0552 12/07/21  0418 " 12/06/21  0343 12/05/21  0414   WBC 10*3/mm3 12.13* 11.26* 12.95* 12.85* 15.74*   HEMOGLOBIN g/dL 8.3* 8.2* 9.4* 8.7* 8.8*   PLATELETS 10*3/mm3 257 239 274 250 225               Imaging Results (Last 24 Hours)     ** No results found for the last 24 hours. **        amLODIPine, 5 mg, Oral, Q24H  aspirin, 81 mg, Oral, Daily  folic acid, 1 mg, Oral, Daily  heparin (porcine), 5,000 Units, Subcutaneous, Q8H  hydrALAZINE, 25 mg, Oral, Q8H  insulin lispro, 0-7 Units, Subcutaneous, TID AC  levothyroxine, 175 mcg, Oral, Q AM  linagliptin, 5 mg, Oral, Daily  melatonin, 5 mg, Oral, Nightly  metoprolol tartrate, 25 mg, Oral, Q12H  multivitamin with minerals, 1 tablet, Oral, Daily  OLANZapine, 10 mg, Intramuscular, Once  OLANZapine, 10 mg, Oral, Nightly  [START ON 12/10/2021] OLANZapine, 5 mg, Oral, Daily  pantoprazole, 40 mg, Oral, Daily  rOPINIRole, 0.75 mg, Oral, Nightly  sertraline, 50 mg, Oral, Daily  sodium bicarbonate, 1,300 mg, Oral, TID  sodium chloride, 10 mL, Intravenous, Q12H  tamsulosin, 0.4 mg, Oral, Daily  thiamine, 250 mg, Oral, Daily      sodium chloride, 9 mL/hr, Last Rate: 9 mL/hr (12/06/21 1210)        Medication Review:   Current Facility-Administered Medications   Medication Dose Route Frequency Provider Last Rate Last Admin   • acetaminophen (TYLENOL) tablet 650 mg  650 mg Oral Q4H PRN Keli Salzaar MD   650 mg at 12/07/21 1351   • albumin human 25 % IV SOLN 12.5 g  12.5 g Intravenous PRN Reynaldo Sotelo MD       • albumin human 25 % IV SOLN 12.5 g  12.5 g Intravenous PRN Reynaldo Sotelo MD       • albumin human 25 % IV SOLN 12.5 g  12.5 g Intravenous PRN Reynaldo Sotelo MD       • amLODIPine (NORVASC) tablet 5 mg  5 mg Oral Q24H Keli Salazar MD   5 mg at 12/09/21 1533   • aspirin chewable tablet 81 mg  81 mg Oral Daily Keli Salazar MD   81 mg at 12/09/21 1533   • dextrose (D50W) (25 g/50 mL) IV injection 25 g  25 g Intravenous Q15 Min PRN Keli Salazar MD   25 g  at 12/08/21 1223   • dextrose (GLUTOSE) oral gel 15 g  15 g Oral Q15 Min PRN Keli Salazar MD   15 g at 12/08/21 1502   • folic acid (FOLVITE) tablet 1 mg  1 mg Oral Daily Keli Salazar MD   1 mg at 12/09/21 1533   • glucagon (human recombinant) (GLUCAGEN DIAGNOSTIC) injection 1 mg  1 mg Subcutaneous PRN Keli Salazar MD       • heparin (porcine) 5000 UNIT/ML injection 5,000 Units  5,000 Units Subcutaneous Q8H Osmar Alva MD   5,000 Units at 12/09/21 1531   • hydrALAZINE (APRESOLINE) tablet 25 mg  25 mg Oral Q8H Keli Salazar MD   25 mg at 12/09/21 1533   • HYDROcodone-acetaminophen (NORCO) 5-325 MG per tablet 1 tablet  1 tablet Oral Q6H PRN Keli Salazar MD       • insulin lispro (ADMELOG) injection 0-7 Units  0-7 Units Subcutaneous TID AC Keli Salazar MD   2 Units at 12/08/21 0930   • labetalol (NORMODYNE,TRANDATE) injection 10 mg  10 mg Intravenous Q6H PRN Keli Salazar MD   10 mg at 12/08/21 1246   • levothyroxine (SYNTHROID, LEVOTHROID) tablet 175 mcg  175 mcg Oral Q AM Keli Salazar MD   175 mcg at 12/09/21 1533   • linagliptin (TRADJENTA) tablet 5 mg  5 mg Oral Daily Keli Salazar MD   5 mg at 12/09/21 1533   • loperamide (IMODIUM) capsule 2 mg  2 mg Oral 4x Daily PRN Keli Salazar MD   2 mg at 12/03/21 1519   • melatonin tablet 5 mg  5 mg Oral Nightly Osmar Alva MD   5 mg at 12/08/21 2031   • metoprolol tartrate (LOPRESSOR) tablet 25 mg  25 mg Oral Q12H Keli Salazar MD   25 mg at 12/09/21 1533   • multivitamin with minerals 1 tablet  1 tablet Oral Daily Osmar Alva MD   1 tablet at 12/09/21 1532   • nitroglycerin (NITROSTAT) SL tablet 0.4 mg  0.4 mg Sublingual Q5 Min Keli Ruiz MD       • OLANZapine (zyPREXA) injection 10 mg  10 mg Intramuscular Once Osmar Alva MD       • OLANZapine (zyPREXA) tablet 10 mg  10 mg Oral Nightly Osmar Alva MD       •  OLANZapine (zyPREXA) tablet 2.5 mg  2.5 mg Oral Q6H PRN Osmar Alva MD   2.5 mg at 12/08/21 0020   • [START ON 12/10/2021] OLANZapine (zyPREXA) tablet 5 mg  5 mg Oral Daily Osmar Alva MD       • pantoprazole (PROTONIX) EC tablet 40 mg  40 mg Oral Daily Keli Salazar MD   40 mg at 12/09/21 1533   • prochlorperazine (COMPAZINE) injection 5 mg  5 mg Intravenous Q6H PRN Keli Salazar MD       • rOPINIRole (REQUIP) tablet 0.75 mg  0.75 mg Oral Nightly Keli Salazar MD   0.75 mg at 12/08/21 2030   • sertraline (ZOLOFT) tablet 50 mg  50 mg Oral Daily Osmar Alva MD   50 mg at 12/09/21 1532   • sodium bicarbonate tablet 1,300 mg  1,300 mg Oral TID Keli Salazar MD   1,300 mg at 12/09/21 1532   • sodium chloride 0.9 % flush 10 mL  10 mL Intravenous Q12H Keli Salazar MD   10 mL at 12/09/21 0831   • sodium chloride 0.9 % flush 10 mL  10 mL Intravenous PRN Keli Salazar MD       • sodium chloride 0.9 % infusion  9 mL/hr Intravenous Continuous PRN Keli Salazar MD 9 mL/hr at 12/06/21 1210 9 mL/hr at 12/06/21 1210   • tamsulosin (FLOMAX) 24 hr capsule 0.4 mg  0.4 mg Oral Daily Osmar Alva MD   0.4 mg at 12/09/21 1532   • thiamine (VITAMIN B-1) tablet 250 mg  250 mg Oral Daily Osmar Alva MD   250 mg at 12/09/21 1532       Assessment/Plan   1.  MAYUR, CKD stage 4 - Cr down slightly 4.2 to 3.9 but has fluctuated 3.5 to 4.2 over stay and was notably 2.1 on 8/7/21 (vs 1.0 in 2019), overall picture more c/w progressive (albeit rapid) CKD (ie diabetic nephropathy) rather than MAYUR.  Sent off GN serologies but doubt will be revealing.  Has multiple uremic sx and eGFR likely worse than 12 mL/min predicted by MDRD based on scant muscle mass.  Only minimal left hydronephrosis on CT so doubt significant enough to be playing any role.  K 5.1.  Phos 5.5  2.  Facial swelling and confusion.  Probably due to myxedema from severe  hypothyroidism.  In light of extremely elevated TSH, ARB as culprit for facial swelling is much less likely  3.  DM2 with poor control  4.  Anemia, hgb up slightly 8.7   5.  Hypertension, watch for over-correction  6.  Type II non-NSTEMI  7.  Severe hypothyroidism.  This is probably driving her mild hyponatremia as well  8.  Medication noncompliance  9.  Diarrhea, resolved   10.NAGMA -    Plan  We will continue dialysis on Tuesday Thursday Saturday schedule  Surveillance labs        MAYUR (acute kidney injury) (HCC)    HTN (hypertension)    Hypothyroidism    Type 2 diabetes mellitus with hyperglycemia, with long-term current use of insulin (HCC)    Rheumatoid arthritis (HCC)    Angioedema    Anemia    Medically noncompliant    Myocardial infarction due to demand ischemia (HCC)    Enteritis    PRES (posterior reversible encephalopathy syndrome)    Urine retention    Klebsiella infection              Reynaldo Sotelo MD  12/09/21  17:54 EST

## 2021-12-09 NOTE — PROGRESS NOTES
Name: Zaina Martinez ADMIT: 2021   : 1965  PCP: Provider, No Known    MRN: [id number] LOS: 10 days   AGE/SEX: 56 y.o. female  ROOM: [address]    [place], Subsequent Hospital Care    Chief Complaint   Patient presents with   • Altered Mental Status     CC: Acute renal failure progressing to chronic renal failure  Subjective     56 y.o. female with apparent end-stage renal failure.  Her acute stage is likely going to turn into total chronic failure unfortunately she has no good vein in the arms.  If PD is being considered we would not place a shunt.  We will follow up outpatient to see where her progress is going.    Review of Systems hallucinating restrained    Objective     Scheduled Medications:   amLODIPine, 5 mg, Oral, Q24H  aspirin, 81 mg, Oral, Daily  folic acid, 1 mg, Oral, Daily  heparin (porcine), 5,000 Units, Subcutaneous, Q8H  hydrALAZINE, 25 mg, Oral, Q8H  insulin glargine, 3 Units, Subcutaneous, QAM  insulin lispro, 0-7 Units, Subcutaneous, TID AC  insulin lispro, 2 Units, Subcutaneous, TID With Meals  levothyroxine, 175 mcg, Oral, Q AM  linagliptin, 5 mg, Oral, Daily  melatonin, 5 mg, Oral, Nightly  metoprolol tartrate, 25 mg, Oral, Q12H  multivitamin with minerals, 1 tablet, Oral, Daily  OLANZapine, 5 mg, Oral, BID  pantoprazole, 40 mg, Oral, Daily  rOPINIRole, 0.75 mg, Oral, Nightly  sertraline, 50 mg, Oral, Daily  sodium bicarbonate, 1,300 mg, Oral, TID  sodium chloride, 10 mL, Intravenous, Q12H  tamsulosin, 0.4 mg, Oral, Daily  thiamine, 250 mg, Oral, Daily        Active Infusions:  sodium chloride, 9 mL/hr, Last Rate: 9 mL/hr (21 1210)        As Needed Medications:  •  acetaminophen **OR** [DISCONTINUED] acetaminophen **OR** [DISCONTINUED] acetaminophen  •  albumin human  •  albumin human  •  albumin human  •  dextrose  •  dextrose  •  glucagon (human recombinant)  •  HYDROcodone-acetaminophen  •  labetalol  •  loperamide  •  nitroglycerin  •  OLANZapine  •   prochlorperazine  •  sodium chloride  •  sodium chloride    Vital Signs  Vital Signs   Patient Vitals for the past 24 hrs:   BP Temp Temp src Pulse Resp SpO2   12/09/21 0758 -- 98.1 °F (36.7 °C) Oral 63 18 90 %   12/09/21 0626 138/80 -- -- -- -- --   12/09/21 0309 126/92 97.9 °F (36.6 °C) Oral 63 18 100 %   12/09/21 0207 141/73 -- -- 62 18 94 %   12/08/21 2313 116/70 97.9 °F (36.6 °C) Oral 64 18 96 %   12/08/21 2150 127/79 -- -- 63 -- --   12/08/21 2030 -- -- -- 66 -- --   12/08/21 2013 133/86 97.8 °F (36.6 °C) Oral 68 18 96 %   12/08/21 1922 124/74 97.8 °F (36.6 °C) Oral 68 18 97 %   12/08/21 1636 128/65 97.7 °F (36.5 °C) Oral 67 18 97 %   12/08/21 1454 -- -- -- 65 -- --   12/08/21 1300 150/89 97.1 °F (36.2 °C) Oral 64 18 94 %   12/08/21 1255 140/86 -- -- -- -- --   12/08/21 1246 -- -- -- 65 -- --   12/08/21 1223 -- -- -- 65 -- --   12/08/21 1200 (!) 185/102 97.9 °F (36.6 °C) Oral 63 18 97 %   12/08/21 0930 -- -- -- 61 -- --     I/O:  I/O last 3 completed shifts:  In: 600 [P.O.:600]  Out: 960 [Urine:960]  BMI:  Body mass index is 24.69 kg/m².    Physical Exam:  Physical Exam   Lungs clear and equal  Heart regular rate and rhythm  Abdomen benign  Extremities viable    Results Review:     CBC    Results from last 7 days   Lab Units 12/09/21  0618 12/08/21  0552 12/07/21  0418 12/06/21  0343 12/05/21  0414 12/04/21  0604 12/03/21  1018   WBC 10*3/mm3 12.13* 11.26* 12.95* 12.85* 15.74* 12.86* 13.71*   HEMOGLOBIN g/dL 8.3* 8.2* 9.4* 8.7* 8.8* 8.6* 10.3*   PLATELETS 10*3/mm3 257 239 274 250 225 201 235     BMP   Results from last 7 days   Lab Units 12/09/21  0618 12/08/21  0552 12/07/21  0418 12/06/21  0343 12/05/21  0414 12/04/21  0604 12/03/21  1018 12/02/21  1147 12/02/21  1147   SODIUM mmol/L 135* 133* 129* 133* 135* 134* 132*   < > 134*   POTASSIUM mmol/L 4.1 3.7 4.1 4.6 5.1 4.8 4.9   < > 4.8   CHLORIDE mmol/L 102 102 99 102 107 103 103   < > 105   CO2 mmol/L 24.6 26.0 17.5* 17.5* 18.0* 19.7* 19.2*   < > 18.7*   BUN  mg/dL 23* 18 32* 49* 44* 41* 40*   < > 42*   CREATININE mg/dL 2.56* 2.37* 2.57* 3.89* 3.95* 4.21* 3.54*   < > 4.22*   GLUCOSE mg/dL 109* 173* 302* 133* 143* 109* 105*   < > 74   MAGNESIUM mg/dL  --   --   --   --   --  1.4*  --   --  1.5*   PHOSPHORUS mg/dL 4.0 3.4 4.8* 5.1* 5.5* 5.4* 5.3*   < > 5.6*    < > = values in this interval not displayed.     Cr Clearance Estimated Creatinine Clearance: 21.1 mL/min (A) (by C-G formula based on SCr of 2.56 mg/dL (H)).  Coag     HbA1C   Lab Results   Component Value Date    HGBA1C 9.78 (H) 11/30/2021    HGBA1C 12.5 (H) 03/30/2021     Blood Glucose   Glucose   Date/Time Value Ref Range Status   12/09/2021 0625 122 70 - 130 mg/dL Final     Comment:     Meter: [id number] : [id number] Jose Escobedou NA   12/08/2021 2030 137 (H) 70 - 130 mg/dL Final     Comment:     Meter: [id number] : [id number] Jose Enid NA   12/08/2021 1823 160 (H) 70 - 130 mg/dL Final     Comment:     Meter: [id number] : [id number] Alfonso Selby RN   12/08/2021 1639 136 (H) 70 - 130 mg/dL Final     Comment:     Meter: [id number] : [id number] [place] NA   12/08/2021 1542 72 70 - 130 mg/dL Final     Comment:     Meter: [id number] : [id number] Alfonso Selby RN   12/08/2021 1459 65 (L) 70 - 130 mg/dL Final     Comment:     Meter: [id number] : [id number] [place] NA   12/08/2021 1243 110 70 - 130 mg/dL Final     Comment:     Meter: [id number] : [id number] [place] NA   12/08/2021 1212 56 (L) 70 - 130 mg/dL Final     Comment:     Meter: [id number] : [id number] Candy SAAVEDRA     Infection   Results from last 7 days   Lab Units 12/04/21  1638   URINECX  >100,000 CFU/mL Klebsiella pneumoniae ssp pneumoniae*     CMP   Results from last 7 days   Lab Units 12/09/21  0618 12/08/21  0552 12/07/21  0418 12/06/21  0343 12/05/21  0414 12/04/21  0604 12/03/21  1018   SODIUM mmol/L 135* 133* 129* 133* 135* 134* 132*   POTASSIUM mmol/L 4.1 3.7 4.1 4.6 5.1 4.8 4.9   CHLORIDE  mmol/L 102 102 99 102 107 103 103   CO2 mmol/L 24.6 26.0 17.5* 17.5* 18.0* 19.7* 19.2*   BUN mg/dL 23* 18 32* 49* 44* 41* 40*   CREATININE mg/dL 2.56* 2.37* 2.57* 3.89* 3.95* 4.21* 3.54*   GLUCOSE mg/dL 109* 173* 302* 133* 143* 109* 105*   ALBUMIN g/dL 1.70* 1.80* 2.00* 2.10* 2.10* 2.00* 2.10*   AMMONIA umol/L 10*  --   --   --   --   --   --      Radiology(recent) No radiology results for the last day    Assessment/Plan     Assessment & Plan      MAYUR (acute kidney injury) (HCC)    HTN (hypertension)    Hypothyroidism    Type 2 diabetes mellitus with hyperglycemia, with long-term current use of insulin (HCC)    Rheumatoid arthritis (HCC)    Angioedema    Anemia    Medically noncompliant    Myocardial infarction due to demand ischemia (HCC)    Enteritis    PRES (posterior reversible encephalopathy syndrome)    Urine retention    Klebsiella infection      56 y.o. female status post tunneled dialysis catheter.  Site looks good.  Progressing to chronic renal failure.  Unfortunately mapping show no good vein for use.  She has small segments of superficial phlebitis due to her IVs but even then the vein size was not going to be adequate.  She will eventually need a shunt as an outpatient assuming she continues to need dialysis.  If she decides to do PD then we would forego a shunt until the PD eventually fails.  Unclear what her hallucinations are from.  We will sign off and follow-up as outpatient in 6 to 8 weeks.    Mat Cummings MD  12/09/21  08:16 EST    Please call my office with any question: [phone]    Active Hospital Problems    Diagnosis  POA   • **MAYUR (acute kidney injury) (HCC) [N17.9]  Yes   • Urine retention [R33.9]  Yes   • Klebsiella infection [A49.8]  Yes   • PRES (posterior reversible encephalopathy syndrome) [I67.83]  Yes   • Enteritis [K52.9]  Clinically Undetermined   • Medically noncompliant [Z91.19]  Not Applicable   • Myocardial infarction due to demand ischemia (HCC) [I21.A1]  Yes   •  Angioedema [T78.3XXA]  Yes   • Anemia [D64.9]  Yes   • HTN (hypertension) [I10]  Yes   • Hypothyroidism [E03.9]  Yes   • Type 2 diabetes mellitus with hyperglycemia, with long-term current use of insulin (HCC) [E11.65, Z79.4]  Not Applicable   • Rheumatoid arthritis (HCC) [M06.9]  Yes      Resolved Hospital Problems    Diagnosis Date Resolved POA   • Hypertensive urgency [I16.0] 11/30/2021 Yes

## 2021-12-10 DIAGNOSIS — I67.83 PRES (POSTERIOR REVERSIBLE ENCEPHALOPATHY SYNDROME): Primary | ICD-10-CM

## 2021-12-10 PROBLEM — I80.9 SUPERFICIAL THROMBOPHLEBITIS: Status: ACTIVE | Noted: 2021-12-10

## 2021-12-10 LAB
ALBUMIN SERPL-MCNC: 1.9 G/DL (ref 3.5–5.2)
ANION GAP SERPL CALCULATED.3IONS-SCNC: 12.8 MMOL/L (ref 5–15)
BASOPHILS # BLD AUTO: 0.02 10*3/MM3 (ref 0–0.2)
BASOPHILS NFR BLD AUTO: 0.2 % (ref 0–1.5)
BUN SERPL-MCNC: 16 MG/DL (ref 6–20)
BUN/CREAT SERPL: 7.3 (ref 7–25)
CALCIUM SPEC-SCNC: 8 MG/DL (ref 8.6–10.5)
CHLORIDE SERPL-SCNC: 104 MMOL/L (ref 98–107)
CO2 SERPL-SCNC: 21.2 MMOL/L (ref 22–29)
CREAT SERPL-MCNC: 2.19 MG/DL (ref 0.57–1)
DEPRECATED RDW RBC AUTO: 46 FL (ref 37–54)
EOSINOPHIL # BLD AUTO: 0.03 10*3/MM3 (ref 0–0.4)
EOSINOPHIL NFR BLD AUTO: 0.2 % (ref 0.3–6.2)
ERYTHROCYTE [DISTWIDTH] IN BLOOD BY AUTOMATED COUNT: 15.1 % (ref 12.3–15.4)
GFR SERPL CREATININE-BSD FRML MDRD: 23 ML/MIN/1.73
GLUCOSE BLDC GLUCOMTR-MCNC: 117 MG/DL (ref 70–130)
GLUCOSE BLDC GLUCOMTR-MCNC: 184 MG/DL (ref 70–130)
GLUCOSE BLDC GLUCOMTR-MCNC: 200 MG/DL (ref 70–130)
GLUCOSE BLDC GLUCOMTR-MCNC: 281 MG/DL (ref 70–130)
GLUCOSE SERPL-MCNC: 117 MG/DL (ref 65–99)
HCT VFR BLD AUTO: 25 % (ref 34–46.6)
HGB BLD-MCNC: 8.3 G/DL (ref 12–15.9)
IMM GRANULOCYTES # BLD AUTO: 0.07 10*3/MM3 (ref 0–0.05)
IMM GRANULOCYTES NFR BLD AUTO: 0.5 % (ref 0–0.5)
LYMPHOCYTES # BLD AUTO: 1.4 10*3/MM3 (ref 0.7–3.1)
LYMPHOCYTES NFR BLD AUTO: 10.7 % (ref 19.6–45.3)
MCH RBC QN AUTO: 27.9 PG (ref 26.6–33)
MCHC RBC AUTO-ENTMCNC: 33.2 G/DL (ref 31.5–35.7)
MCV RBC AUTO: 84.2 FL (ref 79–97)
MONOCYTES # BLD AUTO: 0.91 10*3/MM3 (ref 0.1–0.9)
MONOCYTES NFR BLD AUTO: 7 % (ref 5–12)
NEUTROPHILS NFR BLD AUTO: 10.65 10*3/MM3 (ref 1.7–7)
NEUTROPHILS NFR BLD AUTO: 81.4 % (ref 42.7–76)
NRBC BLD AUTO-RTO: 0 /100 WBC (ref 0–0.2)
PHOSPHATE SERPL-MCNC: 3.9 MG/DL (ref 2.5–4.5)
PLATELET # BLD AUTO: 250 10*3/MM3 (ref 140–450)
PMV BLD AUTO: 11.5 FL (ref 6–12)
POTASSIUM SERPL-SCNC: 4.5 MMOL/L (ref 3.5–5.2)
RBC # BLD AUTO: 2.97 10*6/MM3 (ref 3.77–5.28)
SODIUM SERPL-SCNC: 138 MMOL/L (ref 136–145)
WBC NRBC COR # BLD: 13.08 10*3/MM3 (ref 3.4–10.8)

## 2021-12-10 PROCEDURE — 63710000001 INSULIN LISPRO (HUMAN) PER 5 UNITS: Performed by: STUDENT IN AN ORGANIZED HEALTH CARE EDUCATION/TRAINING PROGRAM

## 2021-12-10 PROCEDURE — 25010000002 EPOETIN ALFA-EPBX 4000 UNIT/ML SOLUTION: Performed by: HOSPITALIST

## 2021-12-10 PROCEDURE — 25010000002 HEPARIN (PORCINE) PER 1000 UNITS: Performed by: INTERNAL MEDICINE

## 2021-12-10 PROCEDURE — 85025 COMPLETE CBC W/AUTO DIFF WBC: CPT | Performed by: STUDENT IN AN ORGANIZED HEALTH CARE EDUCATION/TRAINING PROGRAM

## 2021-12-10 PROCEDURE — 80069 RENAL FUNCTION PANEL: CPT | Performed by: STUDENT IN AN ORGANIZED HEALTH CARE EDUCATION/TRAINING PROGRAM

## 2021-12-10 PROCEDURE — 25010000002 ZIPRASIDONE MESYLATE PER 10 MG

## 2021-12-10 PROCEDURE — 82962 GLUCOSE BLOOD TEST: CPT

## 2021-12-10 RX ORDER — OLANZAPINE 5 MG/1
5 TABLET ORAL EVERY 6 HOURS PRN
Status: DISCONTINUED | OUTPATIENT
Start: 2021-12-10 | End: 2021-12-10

## 2021-12-10 RX ORDER — LEVOTHYROXINE SODIUM 0.15 MG/1
150 TABLET ORAL
Status: DISCONTINUED | OUTPATIENT
Start: 2021-12-11 | End: 2021-12-12

## 2021-12-10 RX ORDER — OLANZAPINE 10 MG/1
5 INJECTION, POWDER, LYOPHILIZED, FOR SOLUTION INTRAMUSCULAR EVERY 8 HOURS PRN
Status: DISCONTINUED | OUTPATIENT
Start: 2021-12-10 | End: 2021-12-17 | Stop reason: HOSPADM

## 2021-12-10 RX ADMIN — SODIUM CHLORIDE, PRESERVATIVE FREE 10 ML: 5 INJECTION INTRAVENOUS at 21:18

## 2021-12-10 RX ADMIN — EPOETIN ALFA-EPBX 4000 UNITS: 4000 INJECTION, SOLUTION INTRAVENOUS; SUBCUTANEOUS at 18:45

## 2021-12-10 RX ADMIN — FOLIC ACID 1 MG: 1 TABLET ORAL at 08:36

## 2021-12-10 RX ADMIN — Medication 250 MG: at 08:36

## 2021-12-10 RX ADMIN — SODIUM BICARBONATE 1300 MG: 650 TABLET ORAL at 15:12

## 2021-12-10 RX ADMIN — OLANZAPINE 5 MG: 10 INJECTION, POWDER, LYOPHILIZED, FOR SOLUTION INTRAMUSCULAR at 18:51

## 2021-12-10 RX ADMIN — LEVOTHYROXINE SODIUM 175 MCG: 0.17 TABLET ORAL at 05:30

## 2021-12-10 RX ADMIN — MULTIPLE VITAMINS W/ MINERALS TAB 1 TABLET: TAB at 08:35

## 2021-12-10 RX ADMIN — HYDRALAZINE HYDROCHLORIDE 25 MG: 50 TABLET, FILM COATED ORAL at 15:07

## 2021-12-10 RX ADMIN — SODIUM BICARBONATE 1300 MG: 650 TABLET ORAL at 08:36

## 2021-12-10 RX ADMIN — INSULIN LISPRO 4 UNITS: 100 INJECTION, SOLUTION INTRAVENOUS; SUBCUTANEOUS at 17:35

## 2021-12-10 RX ADMIN — HEPARIN SODIUM 5000 UNITS: 5000 INJECTION INTRAVENOUS; SUBCUTANEOUS at 05:29

## 2021-12-10 RX ADMIN — ASPIRIN 81 MG: 81 TABLET, CHEWABLE ORAL at 08:36

## 2021-12-10 RX ADMIN — HEPARIN SODIUM 5000 UNITS: 5000 INJECTION INTRAVENOUS; SUBCUTANEOUS at 15:07

## 2021-12-10 RX ADMIN — SODIUM CHLORIDE, PRESERVATIVE FREE 10 ML: 5 INJECTION INTRAVENOUS at 11:57

## 2021-12-10 RX ADMIN — ZIPRASIDONE MESYLATE 20 MG: 20 INJECTION, POWDER, LYOPHILIZED, FOR SOLUTION INTRAMUSCULAR at 03:34

## 2021-12-10 RX ADMIN — SERTRALINE HYDROCHLORIDE 50 MG: 50 TABLET, FILM COATED ORAL at 08:36

## 2021-12-10 RX ADMIN — HEPARIN SODIUM 5000 UNITS: 5000 INJECTION INTRAVENOUS; SUBCUTANEOUS at 21:18

## 2021-12-10 RX ADMIN — LINAGLIPTIN 5 MG: 5 TABLET, FILM COATED ORAL at 08:36

## 2021-12-10 RX ADMIN — OLANZAPINE 5 MG: 5 TABLET ORAL at 08:36

## 2021-12-10 RX ADMIN — METOPROLOL TARTRATE 25 MG: 25 TABLET, FILM COATED ORAL at 08:36

## 2021-12-10 RX ADMIN — PANTOPRAZOLE SODIUM 40 MG: 40 TABLET, DELAYED RELEASE ORAL at 08:37

## 2021-12-10 RX ADMIN — TAMSULOSIN HYDROCHLORIDE 0.4 MG: 0.4 CAPSULE ORAL at 08:36

## 2021-12-10 NOTE — PLAN OF CARE
Goal Outcome Evaluation:  Plan of Care Reviewed With: patient, spouse, daughter        Progress: declining  Outcome Summary: VSS; pt was very restless and confused this morning, family at bedside to calm pt down; pt had episodes of being able to know where she was at, then became confused again; restraints in place and renewed; siter at bedside; continue to monitor.

## 2021-12-10 NOTE — PROGRESS NOTES
"Adult Nutrition  Assessment/PES    Patient Name:  Zaina Martinez  YOB: 1965  MRN: [id number]  Admit Date:  11/29/2021    Assessment Date:  12/10/2021    Comments:  Nutrition follow up. Confused, sleeping. Spoke with  at bedside. States she is eating \"some\" when not sleeping//lethargic, drinks the boost.  Discussed alternate foods available. EMR reviewed.  Will follow clinical course, assist with nutrition needs.     Reason for Assessment     Row Name 12/10/21 1410 12/10/21 1407       Reason for Assessment    Reason For Assessment -- follow-up protocol    Diagnosis neurologic conditions; psychosocial  psychosis, PRES --               Nutrition/Diet History     Row Name 12/10/21 1407          Nutrition/Diet History    Typical Food/Fluid Intake poor po, very lethargic. drinks some of the boost     Factors Affecting Nutritional Intake impaired cognitive status/motor control                  Labs/Tests/Procedures/Meds     Row Name 12/10/21 1407          Labs/Procedures/Meds    Lab Results Reviewed reviewed, pertinent     Lab Results Comments glu, Cr, alb            Diagnostic Tests/Procedures    Diagnostic Test/Procedure Reviewed reviewed, pertinent            Medications    Pertinent Medications Reviewed reviewed, pertinent     Pertinent Medications Comments asa, folic acid, heparin, admelog, iron, synthroid, tradjenta, mvi, protonix, thiamine                Physical Findings     Row Name 12/10/21 1408          Physical Findings    Skin surgical incision                  Nutrition Prescription Ordered     Row Name 12/10/21 1409          Nutrition Prescription PO    Current PO Diet Regular     Supplement Boost Glucose Control (Glucerna Shake)     Supplement Frequency 3 times a day                Evaluation of Received Nutrient/Fluid Intake     Row Name 12/10/21 1410          PO Evaluation    % PO Intake 0-50%               Problem/Interventions:     Intervention Goal     Row Name 12/10/21 1411       "    Intervention Goal    General Maintain nutrition; Disease management/therapy; Improved nutrition related lab(s); Reduce/improve symptoms; Meet nutritional needs for age/condition     PO Tolerate PO; PO intake (%)     PO Intake % 80 %     Weight Maintain weight                Nutrition Intervention     Row Name 12/10/21 1411          Nutrition Intervention    RD/Tech Action Care plan reviewd; Follow Tx progress; Interview for preference; Advise available snack; Advise alternate selection                  Education/Evaluation     Row Name 12/10/21 1411          Education    Education Will Instruct as appropriate            Monitor/Evaluation    Monitor Per protocol; Skin status; Symptoms; I&O; PO intake; Supplement intake; Pertinent labs; Weight                 Electronically signed by:  Adriana Porter RD  12/10/21 14:11 EST

## 2021-12-10 NOTE — NURSING NOTE
Pt has not been urinating today, bladder scanned at 12pm w/ 66ml in bladder, bladder scanned at 1819 w/ >341ml on scanner, straight cathed w/ 65ml urine return, urine was milky looking and has a strong smell, post void residual was 0ml. Dr. Covington was notified regarding above information, no need to concern with low urine output despite that pt has been urinating more volume on other days because she is on dialysis per Dr. Covington, need to collect a urine sample for UA.

## 2021-12-10 NOTE — NURSING NOTE
Pt's  and daughter both at bedside. Pt has not been trying to get out of bed. Per nurse manager, ok to pull sitter out of room for now until family members leave.

## 2021-12-10 NOTE — CONSULTS
IDENTIFYING INFORMATION: The patient is a 56-year-old white female with no reported psychiatric history admitted on 11/29/2021 with acute mental status changes.  She remains in a state of delirium.    CHIEF COMPLAINT: None given    INFORMANT: Family and chart, patient provides all useful information.    RELIABILITY: Good    HISTORY OF PRESENT ILLNESS: The patient is a 56-year-old  white female admitted on 11/29/2021 with acute mental status changes.  The patient was found to be in stage IV renal disease and is now dialysis dependent.  Family reports that she had a similar episode after an adverse reaction to contrast dye earlier this year which resulted in a stay at [facility].  Her mentation returned to baseline thereafter.  Since admission, the patient is undergone 3 dialysis treatments and has been diagnosed withPRES by neurology.  When seen today the patient is clearly delirious.  She is responding to internal stimuli and speaking nonsensically.  Family reports that she was even more agitated earlier this morning.  Prior to her to admission, the patient had been prescribed sertraline and temazepam by her primary care physician and neurology has started the patient on scheduled olanzapine 5 mg every morning and 10 mg at at bedtime.    PAST PSYCHIATRIC HISTORY: As above    PAST MEDICAL HISTORY: Significant for diabetes mellitus, hypertension, stage IV renal disease, restless leg syndrome    MEDICATIONS:   Current Facility-Administered Medications   Medication Dose Route Frequency Provider Last Rate Last Admin   • acetaminophen (TYLENOL) tablet 650 mg  650 mg Oral Q4H PRN Keli Salazar MD   650 mg at 12/07/21 1351   • aspirin chewable tablet 81 mg  81 mg Oral Daily Keli Salazar MD   81 mg at 12/10/21 0836   • dextrose (D50W) (25 g/50 mL) IV injection 25 g  25 g Intravenous Q15 Min PRN Keli Salazar MD   25 g at 12/08/21 1223   • dextrose (GLUTOSE) oral gel 15 g  15 g Oral  Q15 Min PRN Keli Salazar MD   15 g at 12/08/21 1502   • epoetin brooke-epbx (RETACRIT) injection 4,000 Units  4,000 Units Subcutaneous Once per day on Mon Wed Fri Reynaldo Sotelo MD       • folic acid (FOLVITE) tablet 1 mg  1 mg Oral Daily Keli Salazar MD   1 mg at 12/10/21 0836   • glucagon (human recombinant) (GLUCAGEN DIAGNOSTIC) injection 1 mg  1 mg Subcutaneous PRN Keli Salazar MD       • heparin (porcine) 5000 UNIT/ML injection 5,000 Units  5,000 Units Subcutaneous Q8H Osmar Alva MD   5,000 Units at 12/10/21 0529   • hydrALAZINE (APRESOLINE) tablet 25 mg  25 mg Oral Q8H Keli Salazar MD   25 mg at 12/09/21 1533   • HYDROcodone-acetaminophen (NORCO) 5-325 MG per tablet 1 tablet  1 tablet Oral Q6H PRN Keli Salazar MD       • insulin lispro (ADMELOG) injection 0-7 Units  0-7 Units Subcutaneous TID AC Keli Salazar MD   2 Units at 12/08/21 0930   • iron sucrose (VENOFER) 100 mg in sodium chloride 0.9 % 100 mL IVPB  100 mg Intravenous Q48H Reynaldo Sotelo MD       • labetalol (NORMODYNE,TRANDATE) injection 10 mg  10 mg Intravenous Q6H PRN Keli Salazar MD   10 mg at 12/08/21 1246   • [START ON 12/11/2021] levothyroxine (SYNTHROID, LEVOTHROID) tablet 150 mcg  150 mcg Oral Q AM Osmar Alva MD       • linagliptin (TRADJENTA) tablet 5 mg  5 mg Oral Daily Keli Salazar MD   5 mg at 12/10/21 0836   • loperamide (IMODIUM) capsule 2 mg  2 mg Oral 4x Daily PRN Keli Salazar MD   2 mg at 12/03/21 1519   • melatonin tablet 5 mg  5 mg Oral Nightly Osmar Alva MD   5 mg at 12/08/21 2031   • metoprolol tartrate (LOPRESSOR) tablet 25 mg  25 mg Oral Q12H Keli Salazar MD   25 mg at 12/10/21 0836   • multivitamin with minerals 1 tablet  1 tablet Oral Daily Osmar Alva MD   1 tablet at 12/10/21 0835   • nitroglycerin (NITROSTAT) SL tablet 0.4 mg  0.4 mg Sublingual Q5 Min PRN Keli Salazar MD        • OLANZapine (zyPREXA) tablet 10 mg  10 mg Oral Nightly Osmar Alva MD       • OLANZapine (zyPREXA) tablet 5 mg  5 mg Oral Daily Osmar Alva MD   5 mg at 12/10/21 0836   • OLANZapine (zyPREXA) tablet 5 mg  5 mg Oral Q6H PRN Yordan Suero III, MD       • pantoprazole (PROTONIX) EC tablet 40 mg  40 mg Oral Daily Keli Salazar MD   40 mg at 12/10/21 0837   • prochlorperazine (COMPAZINE) injection 5 mg  5 mg Intravenous Q6H PRN Keli Salazar MD       • rOPINIRole (REQUIP) tablet 0.75 mg  0.75 mg Oral Nightly Keli Salazar MD   0.75 mg at 12/08/21 2030   • sodium bicarbonate tablet 1,300 mg  1,300 mg Oral TID Keli Salazar MD   1,300 mg at 12/10/21 0836   • sodium chloride 0.9 % flush 10 mL  10 mL Intravenous Q12H Keli Salazar MD   10 mL at 12/10/21 1157   • sodium chloride 0.9 % flush 10 mL  10 mL Intravenous PRN Keli Salazar MD       • sodium chloride 0.9 % infusion  9 mL/hr Intravenous Continuous PRN Keli Salazar MD 9 mL/hr at 12/06/21 1210 9 mL/hr at 12/06/21 1210   • tamsulosin (FLOMAX) 24 hr capsule 0.4 mg  0.4 mg Oral Daily Osmar Alva MD   0.4 mg at 12/10/21 0836   • thiamine (VITAMIN B-1) tablet 250 mg  250 mg Oral Daily Osmar Alva MD   250 mg at 12/10/21 0836         ALLERGIES: None    FAMILY HISTORY: There is reported family history of bipolar illness    SOCIAL HISTORY: Patient lives with her .  There is no reported use of alcohol tobacco street drugs.    MENTAL STATUS EXAM: The patient is a well-developed well-nourished but somewhat ill-appearing white female dressed in hospital garb.  She is awake but drowsy and is oriented x0.  She is clearly responding to internal stimuli though no restraint devices are necessary at this time.    ASSETS/LIABILITIES: To be assessed    DIAGNOSTIC IMPRESSION: Metabolic encephalopathy, multifactorial, depressive disorder unspecified by history, medical  problems described previously    PLAN: The patient's mentation should return to baseline with resolution of her multiple medical issues including renal disease, hypertension and PRES. I have communicated to family my expectation that we should see some improvement but that her mentation will tend to wax and wane during her period of encephalopathy.  I will leave orders for as needed intramuscular Zyprexa to given for agitation and will continue to follow with you.  I would not, at this time recommend reinitiation of temazepam or sertraline until her mentation has returned to baseline.

## 2021-12-10 NOTE — DISCHARGE PLACEMENT REQUEST
"Zaina Martinez (56 y.o. Female)             Date of Birth Social Security Number Address Home Phone MRN    1965  [id number] [facility] [address] [phone] [id number]    Christianity Marital Status             None        Admission Date Admission Type Admitting Provider Attending Provider Department, Room/Bed    11/29/21 Emergency Chao Fontanez MD Furlow, Stephen Matthew, MD [address], [address]    Discharge Date Discharge Disposition Discharge Destination                         Attending Provider: Osmar Alva MD    Allergies: No Known Allergies    Isolation: None   Infection: None   Code Status: CPR   Advance Care Planning Activity    Ht: 157.5 cm (62\")   Wt: 61.2 kg (135 lb)    Admission Cmt: None   Principal Problem: MAYUR (acute kidney injury) (HCC) [N17.9]                 Active Insurance as of 11/29/2021     Primary Coverage     Payor Plan Insurance Group Employer/Plan Group    ANTH MEDICARE REPLACEMENT ANTHEM MEDICARE ADVANTAGE [id number]     Payor Plan Address Payor Plan Phone Number Payor Plan Fax Number Effective Dates    [address] [phone]  1/1/2019 - None Entered    [facility] [id number]       Subscriber Name Subscriber Birth Date Member ID       ZAINA MARTINEZ 1965 [id number]                 Emergency Contacts      (Rel.) Home Phone Work Phone Mobile Phone    Marv Martinez (Spouse) -- -- [phone]    JuanFiona bradford (Daughter) -- -- [phone]              "

## 2021-12-10 NOTE — PROGRESS NOTES
[facility] Medicine Services  PROGRESS NOTE    Patient Name: Zaina Martinez  : 1965  MRN: [id number]    Date of Admission: 2021  Primary Care Physician: Provider, No Known    Subjective   Subjective     CC:  Follow-up renal failure and confusion.    HPI:  Patient remains severely agitated.  She is trying to climb out of bed.  She is talking to people who are not present and says she knows she is hallucinating.  She can occasionally answer questions.  She is oriented to name and age only.  She is confused about her location and the year but does know it is December.  She was seen a second time later this morning with her  present.  I went through an extensive conversation with her  regarding all of our clinical findings and imaging findings so far.   understands that we are awaiting psychiatry consult to make further recommendations for her agitation/psychosis.   notes that she had a similar episode at her previous hospitalization a few months ago when she had COVID-19 and says she probably has undiagnosed bipolar.  He notes that their daughter also has bipolar and has severe psychiatric issues.      Review of Systems  No current fevers or chills  No current shortness of breath or cough  No current nausea, vomiting, or diarrhea  No current chest pain or palpitations    Objective   Objective     Vital Signs:   Temp:  [97.1 °F (36.2 °C)-98.6 °F (37 °C)] 98.6 °F (37 °C)  Heart Rate:  [68-75] 75  Resp:  [14-20] 18  BP: ()/(40-98) 121/98  Total (NIH Stroke Scale): 2     Physical Exam:  Constitutional:Awake, alert  HENT: NCAT, mucous membranes moist, neck supple  Respiratory: Clear to auscultation bilaterally, respiratory effort normal, nonlabored breathing   Cardiovascular: RRR, normal radial pulses  Gastrointestinal: Positive bowel sounds, soft, nontender, nondistended  Musculoskeletal: Normal musculature for age, minimal lower extremity edema, BMI 25  Psychiatric:  Agitated and hallucinating affect, cooperative, conversational  Neurologic: .  Moving all extremities, no slurred speech or facial droop, follows commands  Skin: No rashes or jaundice, warm    Results Reviewed:  Results from last 7 days   Lab Units 12/10/21  0604 12/09/21  0618 12/08/21  0552   WBC 10*3/mm3 13.08* 12.13* 11.26*   HEMOGLOBIN g/dL 8.3* 8.3* 8.2*   HEMATOCRIT % 25.0* 24.0* 24.0*   PLATELETS 10*3/mm3 250 257 239     Results from last 7 days   Lab Units 12/10/21  0604 12/09/21 0618 12/08/21 0552   SODIUM mmol/L 138 135* 133*   POTASSIUM mmol/L 4.5 4.1 3.7   CHLORIDE mmol/L 104 102 102   CO2 mmol/L 21.2* 24.6 26.0   BUN mg/dL 16 23* 18   CREATININE mg/dL 2.19* 2.56* 2.37*   GLUCOSE mg/dL 117* 109* 173*   CALCIUM mg/dL 8.0* 7.4* 7.4*     Estimated Creatinine Clearance: 24.7 mL/min (A) (by C-G formula based on SCr of 2.19 mg/dL (H)).    Microbiology Results Abnormal     Procedure Component Value - Date/Time    COVID PRE-OP / PRE-PROCEDURE SCREENING ORDER (NO ISOLATION) - Swab, Nasopharynx [[id number]]  (Normal) Collected: 12/06/21 0612    Lab Status: Final result Specimen: Swab from Nasopharynx Updated: 12/06/21 0648    Narrative:      The following orders were created for panel order COVID PRE-OP / PRE-PROCEDURE SCREENING ORDER (NO ISOLATION) - Swab, Nasopharynx.  Procedure                               Abnormality         Status                     ---------                               -----------         ------                     COVID-19, NAZ IN-HOUSE...[[id number]]  Normal              Final result                 Please view results for these tests on the individual orders.    COVID-19,[facility] NAZ IN-HOUSE CEPHEID/MARTY NP SWAB IN TRANSPORT MEDIA 8-12 HR TAT - Swab, Nasopharynx [[id number]]  (Normal) Collected: 12/06/21 0612    Lab Status: Final result Specimen: Swab from Nasopharynx Updated: 12/06/21 0648     COVID19 Not Detected    Narrative:      Fact sheet for providers:  https://www.fda.gov/media/101844/download    Fact sheet for patients: https://www.fda.gov/media/496372/download    Test performed by PCR.    Clostridium Difficile Toxin, PCR - Stool, Per Rectum [[id number]]  (Normal) Collected: 12/02/21 0813    Lab Status: Final result Specimen: Stool from Per Rectum Updated: 12/02/21 0913     C. Difficile Toxins by PCR Negative    COVID PRE-OP / PRE-PROCEDURE SCREENING ORDER (NO ISOLATION) - Swab, Nasopharynx [[id number]]  (Normal) Collected: 11/29/21 2343    Lab Status: Final result Specimen: Swab from Nasopharynx Updated: 11/30/21 0444    Narrative:      The following orders were created for panel order COVID PRE-OP / PRE-PROCEDURE SCREENING ORDER (NO ISOLATION) - Swab, Nasopharynx.  Procedure                               Abnormality         Status                     ---------                               -----------         ------                     COVID-19,APTIMA PANTHER(...[[id number]]  Normal              Final result                 Please view results for these tests on the individual orders.    COVID-19,APTIMA PANTHER(MED),[facility] NAZ, NP/OP SWAB IN UTM/VTM/SALINE TRANSPORT MEDIA,24 HR TAT - Swab, Nasopharynx [[id number]]  (Normal) Collected: 11/29/21 2343    Lab Status: Final result Specimen: Swab from Nasopharynx Updated: 11/30/21 0444     COVID19 Not Detected    Narrative:      Fact sheet for providers: https://www.fda.gov/media/182008/download     Fact sheet for patients: https://www.fda.gov/media/346122/download    Test performed by RT PCR.          Imaging Results (Last 24 Hours)     ** No results found for the last 24 hours. **          Results for orders placed during the hospital encounter of 11/29/21    Adult Transthoracic Echo Complete W/ Cont if Necessary Per Protocol    Interpretation Summary  · Calculated left ventricular EF = 61.8% Estimated left ventricular EF was in agreement with the calculated left ventricular EF. Left ventricular systolic function is  normal.  · Left ventricular diastolic function is consistent with (grade II w/high LAP) pseudonormalization.  · The left atrial cavity is mildly dilated.  · Trace tricuspid valve regurgitation is present. Calculated right ventricular systolic pressure from tricuspid regurgitation is 21.4 mmHg.      I have reviewed the medications:  Scheduled Meds:amLODIPine, 5 mg, Oral, Q24H  aspirin, 81 mg, Oral, Daily  folic acid, 1 mg, Oral, Daily  heparin (porcine), 5,000 Units, Subcutaneous, Q8H  hydrALAZINE, 25 mg, Oral, Q8H  insulin lispro, 0-7 Units, Subcutaneous, TID AC  [START ON 12/11/2021] levothyroxine, 150 mcg, Oral, Q AM  linagliptin, 5 mg, Oral, Daily  melatonin, 5 mg, Oral, Nightly  metoprolol tartrate, 25 mg, Oral, Q12H  multivitamin with minerals, 1 tablet, Oral, Daily  OLANZapine, 10 mg, Oral, Nightly  OLANZapine, 5 mg, Oral, Daily  pantoprazole, 40 mg, Oral, Daily  rOPINIRole, 0.75 mg, Oral, Nightly  sodium bicarbonate, 1,300 mg, Oral, TID  sodium chloride, 10 mL, Intravenous, Q12H  tamsulosin, 0.4 mg, Oral, Daily  thiamine, 250 mg, Oral, Daily      Continuous Infusions:sodium chloride, 9 mL/hr, Last Rate: 9 mL/hr (12/06/21 1210)      PRN Meds:.•  acetaminophen **OR** [DISCONTINUED] acetaminophen **OR** [DISCONTINUED] acetaminophen  •  albumin human  •  albumin human  •  albumin human  •  dextrose  •  dextrose  •  glucagon (human recombinant)  •  HYDROcodone-acetaminophen  •  labetalol  •  loperamide  •  nitroglycerin  •  OLANZapine  •  prochlorperazine  •  sodium chloride  •  sodium chloride    Assessment/Plan   Assessment & Plan     Active Hospital Problems    Diagnosis  POA   • **MAYUR (acute kidney injury) (HCC) [N17.9]  Yes   • Superficial thrombophlebitis [I80.9]  Clinically Undetermined   • Urine retention [R33.9]  Yes   • Klebsiella infection [A49.8]  Yes   • PRES (posterior reversible encephalopathy syndrome) [I67.83]  Yes   • Enteritis [K52.9]  Clinically Undetermined   • Medically noncompliant [Z91.19]   Not Applicable   • Myocardial infarction due to demand ischemia (HCC) [I21.A1]  Yes   • Angioedema [T78.3XXA]  Yes   • Anemia [D64.9]  Yes   • HTN (hypertension) [I10]  Yes   • Hypothyroidism [E03.9]  Yes   • Type 2 diabetes mellitus with hyperglycemia, with long-term current use of insulin (HCC) [E11.65, Z79.4]  Not Applicable   • Rheumatoid arthritis (HCC) [M06.9]  Yes      Resolved Hospital Problems    Diagnosis Date Resolved POA   • Hypertensive urgency [I16.0] 11/30/2021 Yes        Brief Hospital Course to date:  Zaina Martinez is a 56 y.o. female presents with acute kidney injury, enteritis, hypothyroidism, and acute confusion felt to be related to PRES.       Discussion/plan for today:  Sitter at the bedside.  Continue soft limb restraints for patient safety.  Psychiatry consult has been placed and reportedly psychiatry to evaluate today.  Patient's  at the bedside now.  This is the first chance I have had to discuss her case extensively with him.  I have attempted to call him previously multiple times without an answer.  We discussed all the findings so far today and treatment plans for her condition.  Patient's  provided further information as he feels his wife likely has undiagnosed bipolar syndrome and has had previous psychosis last time she was hospitalized with COVID-19.  He describes her as being confused and agitated for days and frequently stripping her clothes off and yelling even when he took her home after this hospitalization.  He says it took more than a week to normalize.  Greater than 40 minutes spent in chart review, coronation care, and counseling.  Greater than half this minutes spent in direct counseling of patient's  and evaluating patient at the bedside today.  PRES  was diagnosed by neurology earlier in the hospital stay.  It is unclear how much this is contributing.  Attempting to avoid fluctuations of blood pressure if possible.  Recheck free T4.  Insulin adjusted  today.     Renal failure:  Now on dialysis, new this admission.    Psychosis:   feels patient has undiagnosed bipolar and has had previous episode of psychosis at her last hospitalization when she had COVID-19.    PRES:   Diagnosed by neurology on MRI.  Working to gently regulate blood pressure.    Hypothyroidism: TSH is severely elevated.  Restart home Synthroid.  Recommend she have free T4 and TSH within 1 month to continue to monitor.  Patient admits to missing her thyroid medication.  I explained to her that this is a very important medication.     Enteritis and UTI:  Abdominal symptoms resolved.  Completed course of ceftriaxone.     Diabetes: Monitor glucose and adjust as needed.  A1c 9.8.  Poorly controlled.  Started Tradjenta.  Correction insulin.     Demand ischemia: Medical management.  Cardiology has evaluated.  Normal echocardiogram.  Needs outpatient follow-up with cardiology.  She agrees with this plan.     Severe anemia:  Status post transfusion.  Hemoccult negative.  No active bleeding reported.  Normocytic.  Mostly consistent with anemia of chronic disease or chronic kidney disease.    B12 relatively normal.  Folic acid is borderline low.  Started Folic acid supplements.    Urine retention: Plan to trial Flomax.  Catheterize as needed.     DVT Prophylaxis: Mechanical    Disposition: Pending    CODE STATUS:   Code Status and Medical Interventions:   Ordered at: 11/30/21 0115     Code Status (Patient has no pulse and is not breathing):    CPR (Attempt to Resuscitate)     Medical Interventions (Patient has pulse or is breathing):    Full Support       Osmar Alva MD  12/10/21

## 2021-12-10 NOTE — PLAN OF CARE
Goal Outcome Evaluation:  Plan of Care Reviewed With: patient        Progress: declining  Outcome Summary: VSS. Pt had dialysis today. Pt became more agitated throughout day. Gave IM 10mg zyprexa. Pt still in restraints and has sitter. Will continue to monitor.

## 2021-12-10 NOTE — PROGRESS NOTES
Clinical Pharmacy Services: Medication History    Zaina Martinez is a 56 y.o. female presenting to [facility] for Elevated troponin [R77.8]  Uncontrolled hypertension [I10]  Acute renal failure, unspecified acute renal failure type (HCC) [N17.9]  Anemia, unspecified type [D64.9]    She  has a past medical history of Diabetes (HCC), Disease of thyroid gland, GERD (gastroesophageal reflux disease), Hypertension, and Rheumatoid arthritis (HCC).    Allergies as of 11/29/2021    (No Known Allergies)       Medication information was obtained from: patient,  and [facility] pharmacy  Pharmacy and Phone Number: [phone]    Prior to Admission Medications       Prescriptions Last Dose Informant Patient Reported? Taking?    aspirin 81 MG chewable tablet 11/29/2021  Yes Yes    Chew 81 mg Daily.    glucose blood (Accu-Chek Liz Plus) test strip 11/29/2021  Yes Yes    4 (Four) Times a Day.    glucose blood (Accu-Chek Guide) test strip 11/29/2021  Yes Yes    1 each by Other route 4 (Four) Times a Day.    insulin lispro (humaLOG) 100 UNIT/ML injection 11/29/2021 Pharmacy Yes Yes    Inject 12 Units under the skin into the appropriate area as directed 3 (Three) Times a Day Before Meals.    levothyroxine (SYNTHROID, LEVOTHROID) 175 MCG tablet 11/29/2021  Yes Yes    Take 175 mcg by mouth Daily.    losartan (COZAAR) 50 MG tablet 11/29/2021 Pharmacy Yes Yes    Take 50 mg by mouth Daily.    pantoprazole (PROTONIX) 40 MG EC tablet 11/29/2021  Yes Yes    Take 40 mg by mouth Daily.    rOPINIRole (REQUIP) 0.25 MG tablet 11/29/2021  Yes Yes    Take 3 tablets by mouth every night at bedtime.    sertraline (ZOLOFT) 100 MG tablet 11/29/2021  Yes Yes    Take 100 mg by mouth Daily.    temazepam (RESTORIL) 7.5 MG capsule Past Week  Yes Yes    Take 7.5 mg by mouth.              Medication notes:   Patient no longer takes Lexapro - pharmacy did not have RX for this and patient although confused said she tried lexapro along time  ago and didn't like the way it made her feel so she takes zoloft now - I removed from the admission med list  [facility] did not have a record of temazepam on file and patient was too confused to confirm or deny if she takes this - I left this on the admission med list as I was unable to confirm that she doesn't take this from elsewhere.  Not currently on inpatient mar    This medication list is complete to the best of my knowledge as of 12/10/2021    Please call if questions.    Erin MaysD, BCPS    12/10/2021 12:00 EST

## 2021-12-10 NOTE — PLAN OF CARE
Problem: Adult Inpatient Plan of Care  Goal: Plan of Care Review  Outcome: Ongoing, Progressing  Flowsheets (Taken 12/10/2021 1819)  Progress: declining  Plan of Care Reviewed With: patient  Outcome Summary: VSS. Pt hallucinating, restless, and combative throughout shift.  Fran Bedoya ordered 1mg of haldol. Pt still restless, agitated, and combative- Fran Bedoya ordered IM Geodon. Non-violent restraints in place and sitter. Pt refusing to take PO meds- Fran Bedoya aware. Pt stable and needs met at this time.

## 2021-12-10 NOTE — PROGRESS NOTES
Discharge Planning Assessment  [facility]     Patient Name: Zaina Martinez  MRN: [id number]  Today's Date: 12/10/2021    Admit Date: 11/29/2021     Discharge Needs Assessment    No documentation.                Discharge Plan     Row Name 12/10/21 1616       Plan    Plan new referrals to [facility] and [facility]; patient will need HD    Plan Comments Call placed to spouse he stated patient will need skilled rehab at discharge and gave CCP permission to make referrals to skilled rehabs that provide inpatient Hemodialysis. New referrals placed to [facility]. [facility]              Continued Care and Services - Admitted Since 11/29/2021     Destination     Service Provider Request Status Selected Services Address Phone Fax Patient Preferred    [facility]  Pending - Request Sent N/A [address] [id number] [phone] --    [facility]  Pending - Request Sent N/A [address] [address] [phone] --    [facility]  Pending - [facility] Sent N/A [address] [id number] [phone] --    [facility]  Pending - Request Sent N/A [address] [id number] [phone] --          Dialysis/Infusion     Service Provider Request Status Selected Services Address Phone Fax Patient Preferred    [facility] [address]  Pending - Request Sent N/A [address] [address] [phone] --              Expected Discharge Date and Time     Expected Discharge Date Expected Discharge Time    Dec 10, 2021          Demographic Summary    No documentation.                Functional Status    No documentation.                Psychosocial    No documentation.                Abuse/Neglect    No documentation.                 Legal    No documentation.                Substance Abuse    No documentation.                Patient Forms    No documentation.                   Angi Lora RN

## 2021-12-10 NOTE — PROGRESS NOTES
"   LOS: 11 days    Patient Care Team:  Provider, No Known as PCP - General    Chief Complaint:    Chief Complaint   Patient presents with   • Altered Mental Status     Follow UP MAYUR CKD4  Subjective     Interval History:   Patient continues to be agitated and confused.  Dialyzed yesterday with no reported issues.  She had a full dialysis treatment with no improvement of her mental status.  Oligoanuric    Objective     Vital Signs  Temp:  [97.1 °F (36.2 °C)-98.6 °F (37 °C)] 98.6 °F (37 °C)  Heart Rate:  [68-75] 75  Resp:  [14-20] 18  BP: ()/(40-98) 121/98    Flowsheet Rows      First Filed Value   Admission Height 157.5 cm (62\") Documented at 11/29/2021 2011   Admission Weight 59 kg (130 lb) Documented at 11/30/2021 0200          I/O this shift:  In: 235 [P.O.:235]  Out: -   I/O last 3 completed shifts:  In: 476 [P.O.:476]  Out: 2450 [Urine:450; Other:2000]    Intake/Output Summary (Last 24 hours) at 12/10/2021 1012  Last data filed at 12/10/2021 0900  Gross per 24 hour   Intake 235 ml   Output 2450 ml   Net -2215 ml       Physical Exam:  General Appearance:  Confused poorly responsive to me today  Neck supple no JVD  Lungs CTA bilat no rales  CV RRR no m/g  abd soft NT/ND  Trace pedal/ankle edema, 2+ radial pulses                      Results Review:    Results from last 7 days   Lab Units 12/10/21  0604 12/09/21  0618 12/08/21  0552   SODIUM mmol/L 138 135* 133*   POTASSIUM mmol/L 4.5 4.1 3.7   CHLORIDE mmol/L 104 102 102   CO2 mmol/L 21.2* 24.6 26.0   BUN mg/dL 16 23* 18   CREATININE mg/dL 2.19* 2.56* 2.37*   CALCIUM mg/dL 8.0* 7.4* 7.4*   GLUCOSE mg/dL 117* 109* 173*       Estimated Creatinine Clearance: 24.7 mL/min (A) (by C-G formula based on SCr of 2.19 mg/dL (H)).    Results from last 7 days   Lab Units 12/10/21  0604 12/09/21  0618 12/08/21  0552 12/05/21  0414 12/04/21  0604   MAGNESIUM mg/dL  --   --   --   --  1.4*   PHOSPHORUS mg/dL 3.9 4.0 3.4   < > 5.4*    < > = values in this interval not " displayed.             Results from last 7 days   Lab Units 12/10/21  0604 12/09/21  0618 12/08/21  0552 12/07/21  0418 12/06/21  0343   WBC 10*3/mm3 13.08* 12.13* 11.26* 12.95* 12.85*   HEMOGLOBIN g/dL 8.3* 8.3* 8.2* 9.4* 8.7*   PLATELETS 10*3/mm3 250 257 239 274 250               Imaging Results (Last 24 Hours)     ** No results found for the last 24 hours. **        amLODIPine, 5 mg, Oral, Q24H  aspirin, 81 mg, Oral, Daily  folic acid, 1 mg, Oral, Daily  heparin (porcine), 5,000 Units, Subcutaneous, Q8H  hydrALAZINE, 25 mg, Oral, Q8H  insulin lispro, 0-7 Units, Subcutaneous, TID AC  [START ON 12/11/2021] levothyroxine, 150 mcg, Oral, Q AM  linagliptin, 5 mg, Oral, Daily  melatonin, 5 mg, Oral, Nightly  metoprolol tartrate, 25 mg, Oral, Q12H  multivitamin with minerals, 1 tablet, Oral, Daily  OLANZapine, 10 mg, Oral, Nightly  OLANZapine, 5 mg, Oral, Daily  pantoprazole, 40 mg, Oral, Daily  rOPINIRole, 0.75 mg, Oral, Nightly  sodium bicarbonate, 1,300 mg, Oral, TID  sodium chloride, 10 mL, Intravenous, Q12H  tamsulosin, 0.4 mg, Oral, Daily  thiamine, 250 mg, Oral, Daily      sodium chloride, 9 mL/hr, Last Rate: 9 mL/hr (12/06/21 1210)        Medication Review:   Current Facility-Administered Medications   Medication Dose Route Frequency Provider Last Rate Last Admin   • acetaminophen (TYLENOL) tablet 650 mg  650 mg Oral Q4H PRN Keli Salazar MD   650 mg at 12/07/21 1351   • albumin human 25 % IV SOLN 12.5 g  12.5 g Intravenous PRN Reynaldo Sotelo MD       • albumin human 25 % IV SOLN 12.5 g  12.5 g Intravenous PRN Reynaldo Sotelo MD       • albumin human 25 % IV SOLN 12.5 g  12.5 g Intravenous PRN Reynaldo Sotelo MD       • amLODIPine (NORVASC) tablet 5 mg  5 mg Oral Q24H Keli Salazar MD   5 mg at 12/09/21 1533   • aspirin chewable tablet 81 mg  81 mg Oral Daily Keli Salazar MD   81 mg at 12/10/21 0836   • dextrose (D50W) (25 g/50 mL) IV injection 25 g  25 g Intravenous Q15 Min  PRN Keli Salazar MD   25 g at 12/08/21 1223   • dextrose (GLUTOSE) oral gel 15 g  15 g Oral Q15 Min PRN Keli Salazar MD   15 g at 12/08/21 1502   • folic acid (FOLVITE) tablet 1 mg  1 mg Oral Daily Keli Salazar MD   1 mg at 12/10/21 0836   • glucagon (human recombinant) (GLUCAGEN DIAGNOSTIC) injection 1 mg  1 mg Subcutaneous PRN Keli Salazar MD       • heparin (porcine) 5000 UNIT/ML injection 5,000 Units  5,000 Units Subcutaneous Q8H Osmar Alva MD   5,000 Units at 12/10/21 0529   • hydrALAZINE (APRESOLINE) tablet 25 mg  25 mg Oral Q8H Keli Salazar MD   25 mg at 12/09/21 1533   • HYDROcodone-acetaminophen (NORCO) 5-325 MG per tablet 1 tablet  1 tablet Oral Q6H PRN Keli Salazar MD       • insulin lispro (ADMELOG) injection 0-7 Units  0-7 Units Subcutaneous TID AC Keli Salazar MD   2 Units at 12/08/21 0930   • labetalol (NORMODYNE,TRANDATE) injection 10 mg  10 mg Intravenous Q6H PRN Keli Salazar MD   10 mg at 12/08/21 1246   • [START ON 12/11/2021] levothyroxine (SYNTHROID, LEVOTHROID) tablet 150 mcg  150 mcg Oral Q AM Osmar Alva MD       • linagliptin (TRADJENTA) tablet 5 mg  5 mg Oral Daily Keli Salazar MD   5 mg at 12/10/21 0836   • loperamide (IMODIUM) capsule 2 mg  2 mg Oral 4x Daily PRN Keli Salazar MD   2 mg at 12/03/21 1519   • melatonin tablet 5 mg  5 mg Oral Nightly Osmar Alva MD   5 mg at 12/08/21 2031   • metoprolol tartrate (LOPRESSOR) tablet 25 mg  25 mg Oral Q12H Keli Salazar MD   25 mg at 12/10/21 0836   • multivitamin with minerals 1 tablet  1 tablet Oral Daily Osmar Alva MD   1 tablet at 12/10/21 0835   • nitroglycerin (NITROSTAT) SL tablet 0.4 mg  0.4 mg Sublingual Q5 Min PRN Keli Salazar MD       • OLANZapine (zyPREXA) tablet 10 mg  10 mg Oral Nightly Osmar Alva MD       • OLANZapine (zyPREXA) tablet 2.5 mg  2.5 mg Oral Q6H PRN Artie  Osmar Rivero MD   2.5 mg at 12/08/21 0020   • OLANZapine (zyPREXA) tablet 5 mg  5 mg Oral Daily Osmar Alva MD   5 mg at 12/10/21 0836   • pantoprazole (PROTONIX) EC tablet 40 mg  40 mg Oral Daily Keli Salazar MD   40 mg at 12/10/21 0837   • prochlorperazine (COMPAZINE) injection 5 mg  5 mg Intravenous Q6H PRN Keli Salazar MD       • rOPINIRole (REQUIP) tablet 0.75 mg  0.75 mg Oral Nightly Keli Salazar MD   0.75 mg at 12/08/21 2030   • sodium bicarbonate tablet 1,300 mg  1,300 mg Oral TID Keli Salazar MD   1,300 mg at 12/10/21 0836   • sodium chloride 0.9 % flush 10 mL  10 mL Intravenous Q12H Keli Salazar MD   10 mL at 12/09/21 2110   • sodium chloride 0.9 % flush 10 mL  10 mL Intravenous PRN Keli Salazar MD       • sodium chloride 0.9 % infusion  9 mL/hr Intravenous Continuous PRN Keli Salazar MD 9 mL/hr at 12/06/21 1210 9 mL/hr at 12/06/21 1210   • tamsulosin (FLOMAX) 24 hr capsule 0.4 mg  0.4 mg Oral Daily Osmar Alva MD   0.4 mg at 12/10/21 0836   • thiamine (VITAMIN B-1) tablet 250 mg  250 mg Oral Daily Osmar Alva MD   250 mg at 12/10/21 0836       Assessment/Plan   1.  MAYUR, CKD stage 4 now dialysis dependent due to possible uremic symptoms.  Had 3 dialysis sessions so far.  Last dialysis was on 12/9/2021 with no reported complication.  2.   Confusion.  Multifactorial secondary possible to metabolic encephalopathy in addition to press.  Patient had finished her course of antibiotic therapy for UTI with no improvement of her mental status.  Dialysis did not improve her mental condition.  Neurology signed off.  3.  DM2 with poor control  4.  Anemia secondary to iron deficiency anemia/CKD.  Ferritin was 100 and her saturation of 21  5.  Hypertension blood pressure is soft lately  6.  Type II non-NSTEMI  7.  Severe hypothyroidism.  On replacement  8.  Medication noncompliance  9.  Diarrhea, resolved   10.NAGMA  -improving    Plan  We will continue dialysis on a Tuesday Thursday Saturday schedule.  Give IV iron on HD.  Start ANDRAE.  We will hold amlodipine because of soft blood pressure        MAYUR (acute kidney injury) (HCC)    HTN (hypertension)    Hypothyroidism    Type 2 diabetes mellitus with hyperglycemia, with long-term current use of insulin (HCC)    Rheumatoid arthritis (HCC)    Angioedema    Anemia    Medically noncompliant    Myocardial infarction due to demand ischemia (HCC)    Enteritis    PRES (posterior reversible encephalopathy syndrome)    Urine retention    Klebsiella infection    Superficial thrombophlebitis              Reynaldo Sotelo MD  12/10/21  10:12 EST

## 2021-12-10 NOTE — SIGNIFICANT NOTE
12/10/21 1439   OTHER   Discipline physical therapist   Rehab Time/Intention   Session Not Performed patient unavailable for treatment; other (see comments)  (Per RN, to hold therapy as pt is sleeping. Pt has been restless and increasingly confused. Pt in soft restraints. PT will continue to monitor and follow up with pt tomorrow.)   Recommendation   PT - Next Appointment 12/11/21

## 2021-12-11 ENCOUNTER — APPOINTMENT (OUTPATIENT)
Dept: GENERAL RADIOLOGY | Facility: HOSPITAL | Age: 56
End: 2021-12-11

## 2021-12-11 LAB
ALBUMIN SERPL-MCNC: 1.7 G/DL (ref 3.5–5.2)
ANION GAP SERPL CALCULATED.3IONS-SCNC: 9.6 MMOL/L (ref 5–15)
BACTERIA UR QL AUTO: ABNORMAL /HPF
BASOPHILS # BLD AUTO: 0.04 10*3/MM3 (ref 0–0.2)
BASOPHILS NFR BLD AUTO: 0.3 % (ref 0–1.5)
BILIRUB UR QL STRIP: NEGATIVE
BUN SERPL-MCNC: 24 MG/DL (ref 6–20)
BUN/CREAT SERPL: 7.9 (ref 7–25)
CALCIUM SPEC-SCNC: 7.8 MG/DL (ref 8.6–10.5)
CHLORIDE SERPL-SCNC: 103 MMOL/L (ref 98–107)
CLARITY UR: ABNORMAL
CO2 SERPL-SCNC: 23.4 MMOL/L (ref 22–29)
COLOR UR: ABNORMAL
CREAT SERPL-MCNC: 3.03 MG/DL (ref 0.57–1)
DEPRECATED RDW RBC AUTO: 47 FL (ref 37–54)
EOSINOPHIL # BLD AUTO: 0.12 10*3/MM3 (ref 0–0.4)
EOSINOPHIL NFR BLD AUTO: 1 % (ref 0.3–6.2)
ERYTHROCYTE [DISTWIDTH] IN BLOOD BY AUTOMATED COUNT: 14.6 % (ref 12.3–15.4)
GFR SERPL CREATININE-BSD FRML MDRD: 16 ML/MIN/1.73
GLUCOSE BLDC GLUCOMTR-MCNC: 119 MG/DL (ref 70–130)
GLUCOSE BLDC GLUCOMTR-MCNC: 199 MG/DL (ref 70–130)
GLUCOSE BLDC GLUCOMTR-MCNC: 233 MG/DL (ref 70–130)
GLUCOSE BLDC GLUCOMTR-MCNC: 263 MG/DL (ref 70–130)
GLUCOSE SERPL-MCNC: 202 MG/DL (ref 65–99)
GLUCOSE UR STRIP-MCNC: ABNORMAL MG/DL
HCT VFR BLD AUTO: 23.9 % (ref 34–46.6)
HGB BLD-MCNC: 7.7 G/DL (ref 12–15.9)
HGB UR QL STRIP.AUTO: ABNORMAL
HYALINE CASTS UR QL AUTO: ABNORMAL /LPF
IMM GRANULOCYTES # BLD AUTO: 0.13 10*3/MM3 (ref 0–0.05)
IMM GRANULOCYTES NFR BLD AUTO: 1 % (ref 0–0.5)
KETONES UR QL STRIP: ABNORMAL
LEUKOCYTE ESTERASE UR QL STRIP.AUTO: ABNORMAL
LYMPHOCYTES # BLD AUTO: 1.32 10*3/MM3 (ref 0.7–3.1)
LYMPHOCYTES NFR BLD AUTO: 10.6 % (ref 19.6–45.3)
MCH RBC QN AUTO: 27.8 PG (ref 26.6–33)
MCHC RBC AUTO-ENTMCNC: 32.2 G/DL (ref 31.5–35.7)
MCV RBC AUTO: 86.3 FL (ref 79–97)
MONOCYTES # BLD AUTO: 0.86 10*3/MM3 (ref 0.1–0.9)
MONOCYTES NFR BLD AUTO: 6.9 % (ref 5–12)
NEUTROPHILS NFR BLD AUTO: 80.2 % (ref 42.7–76)
NEUTROPHILS NFR BLD AUTO: 9.96 10*3/MM3 (ref 1.7–7)
NITRITE UR QL STRIP: NEGATIVE
NRBC BLD AUTO-RTO: 0 /100 WBC (ref 0–0.2)
PH UR STRIP.AUTO: 5.5 [PH] (ref 5–8)
PHOSPHATE SERPL-MCNC: 4.6 MG/DL (ref 2.5–4.5)
PLATELET # BLD AUTO: 230 10*3/MM3 (ref 140–450)
PMV BLD AUTO: 11.4 FL (ref 6–12)
POTASSIUM SERPL-SCNC: 4.1 MMOL/L (ref 3.5–5.2)
PROT UR QL STRIP: ABNORMAL
RBC # BLD AUTO: 2.77 10*6/MM3 (ref 3.77–5.28)
RBC # UR STRIP: ABNORMAL /HPF
REF LAB TEST METHOD: ABNORMAL
SODIUM SERPL-SCNC: 136 MMOL/L (ref 136–145)
SP GR UR STRIP: 1.02 (ref 1–1.03)
SQUAMOUS #/AREA URNS HPF: ABNORMAL /HPF
UROBILINOGEN UR QL STRIP: ABNORMAL
WBC # UR STRIP: ABNORMAL /HPF
WBC NRBC COR # BLD: 12.43 10*3/MM3 (ref 3.4–10.8)
YEAST URNS QL MICRO: ABNORMAL /HPF

## 2021-12-11 PROCEDURE — 87150 DNA/RNA AMPLIFIED PROBE: CPT | Performed by: INTERNAL MEDICINE

## 2021-12-11 PROCEDURE — 97530 THERAPEUTIC ACTIVITIES: CPT

## 2021-12-11 PROCEDURE — 63710000001 INSULIN LISPRO (HUMAN) PER 5 UNITS: Performed by: STUDENT IN AN ORGANIZED HEALTH CARE EDUCATION/TRAINING PROGRAM

## 2021-12-11 PROCEDURE — 87147 CULTURE TYPE IMMUNOLOGIC: CPT | Performed by: INTERNAL MEDICINE

## 2021-12-11 PROCEDURE — 25010000002 HEPARIN (PORCINE) PER 1000 UNITS: Performed by: INTERNAL MEDICINE

## 2021-12-11 PROCEDURE — 87040 BLOOD CULTURE FOR BACTERIA: CPT | Performed by: INTERNAL MEDICINE

## 2021-12-11 PROCEDURE — 81001 URINALYSIS AUTO W/SCOPE: CPT | Performed by: INTERNAL MEDICINE

## 2021-12-11 PROCEDURE — 71045 X-RAY EXAM CHEST 1 VIEW: CPT

## 2021-12-11 PROCEDURE — 25010000002 NA FERRIC GLUC CPLX PER 12.5 MG: Performed by: HOSPITALIST

## 2021-12-11 PROCEDURE — 80069 RENAL FUNCTION PANEL: CPT | Performed by: STUDENT IN AN ORGANIZED HEALTH CARE EDUCATION/TRAINING PROGRAM

## 2021-12-11 PROCEDURE — 87086 URINE CULTURE/COLONY COUNT: CPT | Performed by: INTERNAL MEDICINE

## 2021-12-11 PROCEDURE — 85025 COMPLETE CBC W/AUTO DIFF WBC: CPT | Performed by: STUDENT IN AN ORGANIZED HEALTH CARE EDUCATION/TRAINING PROGRAM

## 2021-12-11 PROCEDURE — 82962 GLUCOSE BLOOD TEST: CPT

## 2021-12-11 RX ORDER — ALBUMIN (HUMAN) 12.5 G/50ML
12.5 SOLUTION INTRAVENOUS AS NEEDED
Status: ACTIVE | OUTPATIENT
Start: 2021-12-11 | End: 2021-12-12

## 2021-12-11 RX ADMIN — OLANZAPINE 5 MG: 5 TABLET ORAL at 12:39

## 2021-12-11 RX ADMIN — FOLIC ACID 1 MG: 1 TABLET ORAL at 12:39

## 2021-12-11 RX ADMIN — OLANZAPINE 10 MG: 10 TABLET ORAL at 20:19

## 2021-12-11 RX ADMIN — TAMSULOSIN HYDROCHLORIDE 0.4 MG: 0.4 CAPSULE ORAL at 12:40

## 2021-12-11 RX ADMIN — HEPARIN SODIUM 5000 UNITS: 5000 INJECTION INTRAVENOUS; SUBCUTANEOUS at 05:11

## 2021-12-11 RX ADMIN — HEPARIN SODIUM 5000 UNITS: 5000 INJECTION INTRAVENOUS; SUBCUTANEOUS at 21:26

## 2021-12-11 RX ADMIN — Medication 5 MG: at 20:19

## 2021-12-11 RX ADMIN — HYDRALAZINE HYDROCHLORIDE 25 MG: 50 TABLET, FILM COATED ORAL at 05:11

## 2021-12-11 RX ADMIN — LINAGLIPTIN 5 MG: 5 TABLET, FILM COATED ORAL at 12:39

## 2021-12-11 RX ADMIN — Medication 250 MG: at 12:39

## 2021-12-11 RX ADMIN — SODIUM CHLORIDE, PRESERVATIVE FREE 10 ML: 5 INJECTION INTRAVENOUS at 20:20

## 2021-12-11 RX ADMIN — METOPROLOL TARTRATE 25 MG: 25 TABLET, FILM COATED ORAL at 20:19

## 2021-12-11 RX ADMIN — METOPROLOL TARTRATE 25 MG: 25 TABLET, FILM COATED ORAL at 12:41

## 2021-12-11 RX ADMIN — ROPINIROLE HYDROCHLORIDE 0.75 MG: 0.5 TABLET, FILM COATED ORAL at 20:19

## 2021-12-11 RX ADMIN — SODIUM BICARBONATE 1300 MG: 650 TABLET ORAL at 17:41

## 2021-12-11 RX ADMIN — LEVOTHYROXINE SODIUM 150 MCG: 0.15 TABLET ORAL at 05:11

## 2021-12-11 RX ADMIN — INSULIN LISPRO 3 UNITS: 100 INJECTION, SOLUTION INTRAVENOUS; SUBCUTANEOUS at 17:41

## 2021-12-11 RX ADMIN — PANTOPRAZOLE SODIUM 40 MG: 40 TABLET, DELAYED RELEASE ORAL at 12:41

## 2021-12-11 RX ADMIN — HEPARIN SODIUM 5000 UNITS: 5000 INJECTION INTRAVENOUS; SUBCUTANEOUS at 14:19

## 2021-12-11 RX ADMIN — SODIUM CHLORIDE, PRESERVATIVE FREE 10 ML: 5 INJECTION INTRAVENOUS at 12:41

## 2021-12-11 RX ADMIN — SODIUM CHLORIDE 125 MG: 9 INJECTION, SOLUTION INTRAVENOUS at 14:24

## 2021-12-11 RX ADMIN — SODIUM BICARBONATE 1300 MG: 650 TABLET ORAL at 20:19

## 2021-12-11 RX ADMIN — SODIUM BICARBONATE 1300 MG: 650 TABLET ORAL at 12:39

## 2021-12-11 RX ADMIN — HYDRALAZINE HYDROCHLORIDE 25 MG: 50 TABLET, FILM COATED ORAL at 14:19

## 2021-12-11 RX ADMIN — MULTIPLE VITAMINS W/ MINERALS TAB 1 TABLET: TAB at 12:39

## 2021-12-11 RX ADMIN — ASPIRIN 81 MG: 81 TABLET, CHEWABLE ORAL at 12:39

## 2021-12-11 NOTE — NURSING NOTE
Dialysis complete and removed 400cc with the treatment, no complications noted. Vital signs are recorded in epic.

## 2021-12-11 NOTE — PROGRESS NOTES
[facility] Medicine Services  PROGRESS NOTE    Patient Name: Zaina Martinez  : 1965  MRN: [id number]    Date of Admission: 2021  Primary Care Physician: Provider, No Known    Subjective   Subjective     CC:  Follow-up renal failure and confusion.    HPI:  Discussed with nursing.  Agitation much improved last night.  Patient resting more.  Patient has a sitter ordered.  Plan for trial off of restraints today.  No other new complaints.    Review of Systems  No current fevers or chills  No current shortness of breath or cough  No current nausea, vomiting, or diarrhea  No current chest pain or palpitations    Objective   Objective     Vital Signs:   Temp:  [97.5 °F (36.4 °C)-98.5 °F (36.9 °C)] 98.5 °F (36.9 °C)  Heart Rate:  [54-72] 65  Resp:  [16-18] 16  BP: (103-157)/(54-93) 127/60  Total (NIH Stroke Scale): 2     Physical Exam:  Constitutional:Awake, alert  HENT: NCAT, mucous membranes moist, neck supple  Respiratory: Clear to auscultation bilaterally, respiratory effort normal, nonlabored breathing   Cardiovascular: RRR, normal radial pulses  Gastrointestinal: Positive bowel sounds, soft, nontender, nondistended  Musculoskeletal: Normal musculature for age, minimal lower extremity edema, BMI 25  Psychiatric: Agitated and hallucinating affect, cooperative, conversational  Neurologic: .  Moving all extremities, no slurred speech or facial droop, follows commands  Skin: No rashes or jaundice, warm    Results Reviewed:  Results from last 7 days   Lab Units 12/11/21  0511 12/10/21  0604 12/09/21  0618   WBC 10*3/mm3 12.43* 13.08* 12.13*   HEMOGLOBIN g/dL 7.7* 8.3* 8.3*   HEMATOCRIT % 23.9* 25.0* 24.0*   PLATELETS 10*3/mm3 230 250 257     Results from last 7 days   Lab Units 12/11/21  0511 12/10/21  0604 12/09/21  0618   SODIUM mmol/L 136 138 135*   POTASSIUM mmol/L 4.1 4.5 4.1   CHLORIDE mmol/L 103 104 102   CO2 mmol/L 23.4 21.2* 24.6   BUN mg/dL 24* 16 23*   CREATININE mg/dL 3.03* 2.19* 2.56*   GLUCOSE  mg/dL 202* 117* 109*   CALCIUM mg/dL 7.8* 8.0* 7.4*     Estimated Creatinine Clearance: 17.8 mL/min (A) (by C-G formula based on SCr of 3.03 mg/dL (H)).    Microbiology Results Abnormal     Procedure Component Value - Date/Time    COVID PRE-OP / PRE-PROCEDURE SCREENING ORDER (NO ISOLATION) - Swab, Nasopharynx [[id number]]  (Normal) Collected: 12/06/21 0612    Lab Status: Final result Specimen: Swab from Nasopharynx Updated: 12/06/21 0648    Narrative:      The following orders were created for panel order COVID PRE-OP / PRE-PROCEDURE SCREENING ORDER (NO ISOLATION) - Swab, Nasopharynx.  Procedure                               Abnormality         Status                     ---------                               -----------         ------                     COVID-19,[facility] IN-HOUSE...[[id number]]  Normal              Final result                 Please view results for these tests on the individual orders.    COVID-19,[facility] IN-HOUSE CEPHEID/MARTY NP SWAB IN TRANSPORT MEDIA 8-12 HR TAT - Swab, Nasopharynx [[id number]]  (Normal) Collected: 12/06/21 0612    Lab Status: Final result Specimen: Swab from Nasopharynx Updated: 12/06/21 0648     COVID19 Not Detected    Narrative:      Fact sheet for providers: https://www.fda.gov/media/456984/download    Fact sheet for patients: https://www.fda.gov/media/610126/download    Test performed by PCR.    Clostridium Difficile Toxin, PCR - Stool, Per Rectum [[id number]]  (Normal) Collected: 12/02/21 0813    Lab Status: Final result Specimen: Stool from Per Rectum Updated: 12/02/21 0913     C. Difficile Toxins by PCR Negative    COVID PRE-OP / PRE-PROCEDURE SCREENING ORDER (NO ISOLATION) - Swab, Nasopharynx [[id number]]  (Normal) Collected: 11/29/21 2343    Lab Status: Final result Specimen: Swab from Nasopharynx Updated: 11/30/21 9368    Narrative:      The following orders were created for panel order COVID PRE-OP / PRE-PROCEDURE SCREENING ORDER (NO ISOLATION) - Swab,  Nasopharynx.  Procedure                               Abnormality         Status                     ---------                               -----------         ------                     COVID-19,APTIMA PANTHER(...[[id number]]  Normal              Final result                 Please view results for these tests on the individual orders.    COVID-19,APTIMA PANTHER(MED), NAZ, NP/OP SWAB IN UTM/VTM/SALINE TRANSPORT MEDIA,24 HR TAT - Swab, Nasopharynx [[id number]]  (Normal) Collected: 11/29/21 2343    Lab Status: Final result Specimen: Swab from Nasopharynx Updated: 11/30/21 0444     COVID19 Not Detected    Narrative:      Fact sheet for providers: https://www.fda.gov/media/693931/download     Fact sheet for patients: https://www.fda.gov/media/002283/download    Test performed by RT PCR.          Imaging Results (Last 24 Hours)     ** No results found for the last 24 hours. **          Results for orders placed during the hospital encounter of 11/29/21    Adult Transthoracic Echo Complete W/ Cont if Necessary Per Protocol    Interpretation Summary  · Calculated left ventricular EF = 61.8% Estimated left ventricular EF was in agreement with the calculated left ventricular EF. Left ventricular systolic function is normal.  · Left ventricular diastolic function is consistent with (grade II w/high LAP) pseudonormalization.  · The left atrial cavity is mildly dilated.  · Trace tricuspid valve regurgitation is present. Calculated right ventricular systolic pressure from tricuspid regurgitation is 21.4 mmHg.      I have reviewed the medications:  Scheduled Meds:aspirin, 81 mg, Oral, Daily  epoetin brooke/brooke-epbx, 4,000 Units, Subcutaneous, Once per day on Mon Wed Fri  folic acid, 1 mg, Oral, Daily  heparin (porcine), 5,000 Units, Subcutaneous, Q8H  hydrALAZINE, 25 mg, Oral, Q8H  insulin lispro, 0-7 Units, Subcutaneous, TID AC  iron sucrose, 100 mg, Intravenous, Q48H  levothyroxine, 150 mcg, Oral, Q AM  linagliptin, 5 mg,  Oral, Daily  melatonin, 5 mg, Oral, Nightly  metoprolol tartrate, 25 mg, Oral, Q12H  multivitamin with minerals, 1 tablet, Oral, Daily  OLANZapine, 10 mg, Oral, Nightly  OLANZapine, 5 mg, Oral, Daily  pantoprazole, 40 mg, Oral, Daily  rOPINIRole, 0.75 mg, Oral, Nightly  sodium bicarbonate, 1,300 mg, Oral, TID  sodium chloride, 10 mL, Intravenous, Q12H  tamsulosin, 0.4 mg, Oral, Daily  thiamine, 250 mg, Oral, Daily      Continuous Infusions:sodium chloride, 9 mL/hr, Last Rate: 9 mL/hr (12/06/21 1210)      PRN Meds:.•  acetaminophen **OR** [DISCONTINUED] acetaminophen **OR** [DISCONTINUED] acetaminophen  •  albumin human  •  dextrose  •  dextrose  •  glucagon (human recombinant)  •  HYDROcodone-acetaminophen  •  labetalol  •  loperamide  •  nitroglycerin  •  OLANZapine  •  prochlorperazine  •  sodium chloride  •  sodium chloride    Assessment/Plan   Assessment & Plan     Active Hospital Problems    Diagnosis  POA   • **MAYUR (acute kidney injury) (HCC) [N17.9]  Yes   • Superficial thrombophlebitis [I80.9]  Clinically Undetermined   • Urine retention [R33.9]  Yes   • Klebsiella infection [A49.8]  Yes   • PRES (posterior reversible encephalopathy syndrome) [I67.83]  Yes   • Enteritis [K52.9]  Clinically Undetermined   • Medically noncompliant [Z91.19]  Not Applicable   • Myocardial infarction due to demand ischemia (HCC) [I21.A1]  Yes   • Angioedema [T78.3XXA]  Yes   • Anemia [D64.9]  Yes   • HTN (hypertension) [I10]  Yes   • Hypothyroidism [E03.9]  Yes   • Type 2 diabetes mellitus with hyperglycemia, with long-term current use of insulin (HCC) [E11.65, Z79.4]  Not Applicable   • Rheumatoid arthritis (HCC) [M06.9]  Yes      Resolved Hospital Problems    Diagnosis Date Resolved POA   • Hypertensive urgency [I16.0] 11/30/2021 Yes        Brief Hospital Course to date:  Zaina Martinez is a 56 y.o. female presents with acute kidney injury, enteritis, hypothyroidism, and acute confusion felt to be related to PRES.        Discussion/plan for today:  Minimal leukocytosis persists.  Significance is unclear.  Patient is afebrile and hemodynamically stable.  She has completed a course of ceftriaxone for urinary tract infection.  Plan to check blood cultures, chest x-ray, and repeat urine to rule out any other ongoing infection.  Continue sitter.  Plan to try off of restraints.  Case discussed with psychiatry they plan to reevaluate today.  Continue Zyprexa for now.   has now provided further information that he feels patient likely has undiagnosed bipolar and has had previous extreme cases of delirium while hospitalized similar to this episode.  PRES  was diagnosed by neurology earlier in the hospital stay.  It is unclear how much this is contributing.  Attempting to avoid fluctuations of blood pressure if possible.  Recheck free T4.  Insulin adjusted today.     Renal failure:  Now on dialysis, new this admission.    Psychosis, possible undiagnosed bipolar syndrome with acute psychotic manic phase:   feels patient has undiagnosed bipolar and has had previous episode of psychosis at her last hospitalization when she had COVID-19.  Plan for mood stabilizer, patient has required restraints.  Sitter as needed.  Supportive care and symptom treatment and injury prevention needed.    PRES:   Diagnosed by neurology on MRI.  Working to gently regulate blood pressure.    Hypothyroidism: TSH is severely elevated.    Synthroid adjusted.  Recommend she have free T4 and TSH within 1 month to continue to monitor.  Patient admits to missing her thyroid medication.  I explained to her that this is a very important medication.     Enteritis and UTI:  Abdominal symptoms resolved.  Completed course of ceftriaxone.     Diabetes: Monitor glucose and adjust as needed.  A1c 9.8.  Poorly controlled.  Started Tradjenta.  Correction insulin.     Demand ischemia: Medical management.  Cardiology has evaluated.  Normal echocardiogram.  Needs  outpatient follow-up with cardiology.  She agrees with this plan.     Severe anemia:  Status post transfusion.  Hemoccult negative.  No active bleeding reported.  Normocytic.  Mostly consistent with anemia of chronic disease or chronic kidney disease.    B12 relatively normal.  Folic acid is borderline low.  Started Folic acid supplements.    Urine retention: Plan to trial Flomax.  Catheterize as needed.     DVT Prophylaxis: Mechanical    Disposition: Pending    CODE STATUS:   Code Status and Medical Interventions:   Ordered at: 11/30/21 0115     Code Status (Patient has no pulse and is not breathing):    CPR (Attempt to Resuscitate)     Medical Interventions (Patient has pulse or is breathing):    Full Support       Osmar Alva MD  12/11/21

## 2021-12-11 NOTE — THERAPY PROGRESS REPORT/RE-CERT
Patient Name: Zaina Martinez  : 1965    MRN: [id number]                              Today's Date: 2021       Admit Date: 2021    Visit Dx:     ICD-10-CM ICD-9-CM   1. Acute renal failure, unspecified acute renal failure type (HCC)  N17.9 584.9   2. Uncontrolled hypertension  I10 401.9   3. Anemia, unspecified type  D64.9 285.9   4. Elevated troponin  R77.8 790.6   5. Impaired mobility and activities of daily living  Z74.09 V49.89    Z78.9      Patient Active Problem List   Diagnosis   • MAYUR (acute kidney injury) (HCC)   • HTN (hypertension)   • Hypothyroidism   • Type 2 diabetes mellitus with hyperglycemia, with long-term current use of insulin (HCC)   • Rheumatoid arthritis (HCC)   • Angioedema   • GERD (gastroesophageal reflux disease)   • Anemia   • Medically noncompliant   • Myocardial infarction due to demand ischemia (HCC)   • Enteritis   • PRES (posterior reversible encephalopathy syndrome)   • Urine retention   • Klebsiella infection   • Superficial thrombophlebitis     Past Medical History:   Diagnosis Date   • Diabetes (HCC)    • Disease of thyroid gland    • GERD (gastroesophageal reflux disease)    • Hypertension    • Rheumatoid arthritis (HCC)      Past Surgical History:   Procedure Laterality Date   • EYE SURGERY     • HYSTERECTOMY     • INSERTION HEMODIALYSIS CATHETER N/A 2021    Procedure: HEMODIALYSIS CATHETER INSERTION;  Surgeon: Keli Salazar MD;  Location: [facility] ;  Service: Vascular;  Laterality: N/A;      General Information     Row Name 21 3882          Physical Therapy Time and Intention    Document Type therapy note (daily note)  -CW     Mode of Treatment physical therapy  -CW     Row Name 21 1206          General Information    Patient Profile Reviewed yes  -CW     Existing Precautions/Restrictions fall  -CW     Row Name 21 8733          Cognition    Orientation Status (Cognition) unable/difficult to assess  -CW           User  Key  (r) = Recorded By, (t) = Taken By, (c) = Cosigned By    Initials Name Provider Type    Mohit Shrestha PTA Physical Therapy Assistant               Mobility     Row Name 12/11/21 1400          Bed Mobility    Bed Mobility supine-sit; sit-supine  -CW     Supine-Sit Pleasants (Bed Mobility) minimum assist (75% patient effort); 2 person assist; verbal cues; nonverbal cues (demo/gesture)  -CW     Sit-Supine Pleasants (Bed Mobility) 2 person assist; verbal cues; nonverbal cues (demo/gesture); moderate assist (50% patient effort)  -CW     Assistive Device (Bed Mobility) draw sheet; head of bed elevated  -CW     Row Name 12/11/21 1400          Bed-Chair Transfer    Bed-Chair Pleasants (Transfers) minimum assist (75% patient effort); 2 person assist; verbal cues; nonverbal cues (demo/gesture); moderate assist (50% patient effort)  -CW           User Key  (r) = Recorded By, (t) = Taken By, (c) = Cosigned By    Initials Name Provider Type    Mohit Shrestha PTA Physical Therapy Assistant               Obj/Interventions    No documentation.                Goals/Plan    No documentation.                Clinical Impression     Row Name 12/11/21 1401          Therapy Assessment/Plan (PT)    Rehab Potential (PT) fair, will monitor progress closely  -CW     Criteria for Skilled Interventions Met (PT) yes  -CW     Row Name 12/11/21 1401          Positioning and Restraints    Pre-Treatment Position in bed  -CW     Post Treatment Position bed  -CW     In Bed notified nsg; supine; call light within reach; encouraged to call for assist; exit alarm on; patient within staff view  -CW           User Key  (r) = Recorded By, (t) = Taken By, (c) = Cosigned By    Initials Name Provider Type    Mohit Shrestha PTA Physical Therapy Assistant               Outcome Measures     Row Name 12/11/21 1403          How much help from another person do you currently need...    Turning from your back to your side  while in flat bed without using bedrails? 2  -CW     Moving from lying on back to sitting on the side of a flat bed without bedrails? 2  -CW     Moving to and from a bed to a chair (including a wheelchair)? 2  -CW     Standing up from a chair using your arms (e.g., wheelchair, bedside chair)? 2  -CW     Climbing 3-5 steps with a railing? 1  -CW     To walk in hospital room? 2  -CW     AM-PAC 6 Clicks Score (PT) 11  -CW           User Key  (r) = Recorded By, (t) = Taken By, (c) = Cosigned By    Initials Name Provider Type    CW Mohit Eng, PTA Physical Therapy Assistant                             Physical Therapy Education                 Title: PT OT SLP Therapies (In Progress)     Topic: Physical Therapy (In Progress)     Point: Mobility training (In Progress)     Learning Progress Summary           Patient Acceptance, E, NR by  at 12/9/2021 1543                   Point: Home exercise program (In Progress)     Learning Progress Summary           Patient Acceptance, E, NR by  at 12/9/2021 1543                   Point: Body mechanics (In Progress)     Learning Progress Summary           Patient Acceptance, E, NR by  at 12/9/2021 1543                   Point: Precautions (In Progress)     Learning Progress Summary           Patient Acceptance, E, NR by  at 12/9/2021 1543                               User Key     Initials Effective Dates Name Provider Type Discipline     06/16/21 -  Liz Fu, PT Physical Therapist PT              PT Recommendation and Plan           Time Calculation:    PT Charges     Row Name 12/11/21 1403             Time Calculation    Start Time 1348  -CW      Stop Time 1403  -CW      Time Calculation (min) 15 min  -CW      PT Received On 12/11/21  -CW      PT - Next Appointment 12/12/21  -CW              Timed Charges    14595 - PT Therapeutic Activity Minutes 15  -CW              Total Minutes    Timed Charges Total Minutes 15  -CW       Total Minutes 15  -CW             User Key  (r) = Recorded By, (t) = Taken By, (c) = Cosigned By    Initials Name Provider Type    CW Mohit Eng PTA Physical Therapy Assistant              Therapy Charges for Today     Code Description Service Date Service Provider Modifiers Qty    [id number]  PT THERAPEUTIC ACT EA 15 MIN 12/11/2021 Mohit Eng PTA GP 1          PT G-Codes  Outcome Measure Options: AM-PAC 6 Clicks Basic Mobility (PT)  AM-PAC 6 Clicks Score (PT): 11    Mohit Eng PTA  12/11/2021

## 2021-12-11 NOTE — PROGRESS NOTES
"   LOS: 12 days    Patient Care Team:  Provider, No Known as PCP - General    Chief Complaint:    Chief Complaint   Patient presents with   • Altered Mental Status     Follow UP MAYUR CKD4  Subjective     Interval History:   Underwent dialysis earlier today    Objective     Vital Signs  Temp:  [97.5 °F (36.4 °C)-98.6 °F (37 °C)] 98.5 °F (36.9 °C)  Heart Rate:  [54-72] 69  Resp:  [16-20] 20  BP: (103-157)/(54-93) 126/59    Flowsheet Rows      First Filed Value   Admission Height 157.5 cm (62\") Documented at 11/29/2021 2011   Admission Weight 59 kg (130 lb) Documented at 11/30/2021 0200          I/O this shift:  In: -   Out: 400 [Other:400]  I/O last 3 completed shifts:  In: 585 [P.O.:585]  Out: 515 [Urine:515]    Intake/Output Summary (Last 24 hours) at 12/11/2021 1211  Last data filed at 12/11/2021 1127  Gross per 24 hour   Intake 350 ml   Output 465 ml   Net -115 ml       Physical Exam:  General Appearance:  Confused poorly responsive to me today  Neck supple no JVD  Lungs CTA bilat no rales  CV RRR no m/g  abd soft NT/ND  Trace pedal/ankle edema, 2+ radial pulses                      Results Review:    Results from last 7 days   Lab Units 12/11/21  0511 12/10/21  0604 12/09/21  0618   SODIUM mmol/L 136 138 135*   POTASSIUM mmol/L 4.1 4.5 4.1   CHLORIDE mmol/L 103 104 102   CO2 mmol/L 23.4 21.2* 24.6   BUN mg/dL 24* 16 23*   CREATININE mg/dL 3.03* 2.19* 2.56*   CALCIUM mg/dL 7.8* 8.0* 7.4*   GLUCOSE mg/dL 202* 117* 109*       Estimated Creatinine Clearance: 17.8 mL/min (A) (by C-G formula based on SCr of 3.03 mg/dL (H)).    Results from last 7 days   Lab Units 12/11/21  0511 12/10/21  0604 12/09/21  0618   PHOSPHORUS mg/dL 4.6* 3.9 4.0             Results from last 7 days   Lab Units 12/11/21  0511 12/10/21  0604 12/09/21  0618 12/08/21  0552 12/07/21  0418   WBC 10*3/mm3 12.43* 13.08* 12.13* 11.26* 12.95*   HEMOGLOBIN g/dL 7.7* 8.3* 8.3* 8.2* 9.4*   PLATELETS 10*3/mm3 230 250 257 239 274               Imaging " Results (Last 24 Hours)     ** No results found for the last 24 hours. **        aspirin, 81 mg, Oral, Daily  epoetin brooke/brooke-epbx, 4,000 Units, Subcutaneous, Once per day on Mon Wed Fri  sodium ferric gluconate complex IVPB in 100 mL NS, 125 mg, Intravenous, Q48H  folic acid, 1 mg, Oral, Daily  heparin (porcine), 5,000 Units, Subcutaneous, Q8H  hydrALAZINE, 25 mg, Oral, Q8H  insulin lispro, 0-7 Units, Subcutaneous, TID AC  levothyroxine, 150 mcg, Oral, Q AM  linagliptin, 5 mg, Oral, Daily  melatonin, 5 mg, Oral, Nightly  metoprolol tartrate, 25 mg, Oral, Q12H  multivitamin with minerals, 1 tablet, Oral, Daily  OLANZapine, 10 mg, Oral, Nightly  OLANZapine, 5 mg, Oral, Daily  pantoprazole, 40 mg, Oral, Daily  rOPINIRole, 0.75 mg, Oral, Nightly  sodium bicarbonate, 1,300 mg, Oral, TID  sodium chloride, 10 mL, Intravenous, Q12H  tamsulosin, 0.4 mg, Oral, Daily  thiamine, 250 mg, Oral, Daily      sodium chloride, 9 mL/hr, Last Rate: 9 mL/hr (12/06/21 1210)        Medication Review:   Current Facility-Administered Medications   Medication Dose Route Frequency Provider Last Rate Last Admin   • acetaminophen (TYLENOL) tablet 650 mg  650 mg Oral Q4H PRN Keli Salazar MD   650 mg at 12/07/21 1351   • albumin human 25 % IV SOLN 12.5 g  12.5 g Intravenous PRN Reynaldo Sotelo MD       • aspirin chewable tablet 81 mg  81 mg Oral Daily Keli Salazar MD   81 mg at 12/10/21 0836   • dextrose (D50W) (25 g/50 mL) IV injection 25 g  25 g Intravenous Q15 Min PRN Keli Salazar MD   25 g at 12/08/21 1223   • dextrose (GLUTOSE) oral gel 15 g  15 g Oral Q15 Min PRN Keli Salazar MD   15 g at 12/08/21 1502   • epoetin brooke-epbx (RETACRIT) injection 4,000 Units  4,000 Units Subcutaneous Once per day on Mon Wed Fri Reynaldo Sotelo MD   4,000 Units at 12/10/21 1845   • ferric gluconate (FERRLECIT) 125 mg in sodium chloride 0.9 % 100 mL IVPB  125 mg Intravenous Q48H Reynaldo Sotelo MD       • folic acid  (FOLVITE) tablet 1 mg  1 mg Oral Daily Keli Salazar MD   1 mg at 12/10/21 0836   • glucagon (human recombinant) (GLUCAGEN DIAGNOSTIC) injection 1 mg  1 mg Subcutaneous PRN Keli Salazar MD       • heparin (porcine) 5000 UNIT/ML injection 5,000 Units  5,000 Units Subcutaneous Q8H Osmar Alva MD   5,000 Units at 12/11/21 0511   • hydrALAZINE (APRESOLINE) tablet 25 mg  25 mg Oral Q8H Keli Salazar MD   25 mg at 12/11/21 0511   • HYDROcodone-acetaminophen (NORCO) 5-325 MG per tablet 1 tablet  1 tablet Oral Q6H PRN Keli Salazar MD       • insulin lispro (ADMELOG) injection 0-7 Units  0-7 Units Subcutaneous TID AC Keli Salazar MD   4 Units at 12/10/21 1735   • labetalol (NORMODYNE,TRANDATE) injection 10 mg  10 mg Intravenous Q6H PRN Keli Salazar MD   10 mg at 12/08/21 1246   • levothyroxine (SYNTHROID, LEVOTHROID) tablet 150 mcg  150 mcg Oral Q AM Osmar Alva MD   150 mcg at 12/11/21 0511   • linagliptin (TRADJENTA) tablet 5 mg  5 mg Oral Daily Keli Salazar MD   5 mg at 12/10/21 0836   • loperamide (IMODIUM) capsule 2 mg  2 mg Oral 4x Daily PRN Keli Salazar MD   2 mg at 12/03/21 1519   • melatonin tablet 5 mg  5 mg Oral Nightly Osmar Alva MD   5 mg at 12/08/21 2031   • metoprolol tartrate (LOPRESSOR) tablet 25 mg  25 mg Oral Q12H Keli Salazar MD   25 mg at 12/10/21 0836   • multivitamin with minerals 1 tablet  1 tablet Oral Daily Osmar Alva MD   1 tablet at 12/10/21 0835   • nitroglycerin (NITROSTAT) SL tablet 0.4 mg  0.4 mg Sublingual Q5 Min PRN Keli Salazar Giselle, MD       • OLANZapine (zyPREXA) injection 5 mg  5 mg Intramuscular Q8H PRN Yordan Suero III, MD   5 mg at 12/10/21 1851   • OLANZapine (zyPREXA) tablet 10 mg  10 mg Oral Nightly Osmar Alva MD       • OLANZapine (zyPREXA) tablet 5 mg  5 mg Oral Daily Osmar Alva MD   5 mg at 12/10/21 0836   •  pantoprazole (PROTONIX) EC tablet 40 mg  40 mg Oral Daily Keli Salazar MD   40 mg at 12/10/21 0837   • prochlorperazine (COMPAZINE) injection 5 mg  5 mg Intravenous Q6H PRN Keli Salazar MD       • rOPINIRole (REQUIP) tablet 0.75 mg  0.75 mg Oral Nightly Keli Salazar MD   0.75 mg at 12/08/21 2030   • sodium bicarbonate tablet 1,300 mg  1,300 mg Oral TID Keli Salazar MD   1,300 mg at 12/10/21 1512   • sodium chloride 0.9 % flush 10 mL  10 mL Intravenous Q12H Keli Salazar MD   10 mL at 12/10/21 2118   • sodium chloride 0.9 % flush 10 mL  10 mL Intravenous PRN Keli Salazar MD       • sodium chloride 0.9 % infusion  9 mL/hr Intravenous Continuous PRN Keli Salazar MD 9 mL/hr at 12/06/21 1210 9 mL/hr at 12/06/21 1210   • tamsulosin (FLOMAX) 24 hr capsule 0.4 mg  0.4 mg Oral Daily Osmar Alva MD   0.4 mg at 12/10/21 0836   • thiamine (VITAMIN B-1) tablet 250 mg  250 mg Oral Daily Osmar Alva MD   250 mg at 12/10/21 0836       Assessment/Plan   1.  MAYUR, CKD stage 4 now dialysis dependent due to possible uremic symptoms.  Had 3 dialysis sessions so far.  Last dialysis was on 12/9/2021 with no reported complication.  2.   Confusion.  Multifactorial secondary possible to metabolic encephalopathy in addition to press.  Patient had finished her course of antibiotic therapy for UTI with no improvement of her mental status.  Dialysis did not improve her mental condition.  Neurology signed off.  3.  DM2 with poor control  4.  Anemia secondary to iron deficiency anemia/CKD.  Ferritin was 100 and her saturation of 21  5.  Hypertension blood pressure is soft lately  6.  Type II non-NSTEMI  7.  Severe hypothyroidism.  On replacement  8.  Medication noncompliance  9.  Diarrhea, resolved   10.NAGMA -improving    Plan  We will continue dialysis on a Tuesday Thursday Saturday schedule.  Give IV iron on HD.  Continue ANDRAE.  Continue to hold amlodipine for  borderline hemodynamics        MAYUR (acute kidney injury) (HCC)    HTN (hypertension)    Hypothyroidism    Type 2 diabetes mellitus with hyperglycemia, with long-term current use of insulin (HCC)    Rheumatoid arthritis (HCC)    Angioedema    Anemia    Medically noncompliant    Myocardial infarction due to demand ischemia (HCC)    Enteritis    PRES (posterior reversible encephalopathy syndrome)    Urine retention    Klebsiella infection    Superficial thrombophlebitis              Didi Mcleod MD  12/11/21  12:11 EST

## 2021-12-11 NOTE — PLAN OF CARE
Problem: Adult Inpatient Plan of Care  Goal: Plan of Care Review  Outcome: Ongoing, Progressing  Flowsheets (Taken 12/11/2021 9339)  Progress: improving  Plan of Care Reviewed With:   patient   daughter  Outcome Summary: VSS. Pt has been resting throughout shift. Hallucinations continue but very mild. Non-violent restraints and sitter in place. Pt stable and needs met at this time.

## 2021-12-12 LAB
ALBUMIN SERPL-MCNC: 1.6 G/DL (ref 3.5–5.2)
ANION GAP SERPL CALCULATED.3IONS-SCNC: 8.6 MMOL/L (ref 5–15)
BACTERIA BLD CULT: ABNORMAL
BACTERIA SPEC AEROBE CULT: NO GROWTH
BASOPHILS # BLD AUTO: 0.03 10*3/MM3 (ref 0–0.2)
BASOPHILS NFR BLD AUTO: 0.3 % (ref 0–1.5)
BOTTLE TYPE: ABNORMAL
BUN SERPL-MCNC: 19 MG/DL (ref 6–20)
BUN/CREAT SERPL: 8.1 (ref 7–25)
CALCIUM SPEC-SCNC: 7.6 MG/DL (ref 8.6–10.5)
CHLORIDE SERPL-SCNC: 104 MMOL/L (ref 98–107)
CO2 SERPL-SCNC: 24.4 MMOL/L (ref 22–29)
CREAT SERPL-MCNC: 2.34 MG/DL (ref 0.57–1)
DEPRECATED RDW RBC AUTO: 46.4 FL (ref 37–54)
EOSINOPHIL # BLD AUTO: 0.11 10*3/MM3 (ref 0–0.4)
EOSINOPHIL NFR BLD AUTO: 1.1 % (ref 0.3–6.2)
ERYTHROCYTE [DISTWIDTH] IN BLOOD BY AUTOMATED COUNT: 14.5 % (ref 12.3–15.4)
GFR SERPL CREATININE-BSD FRML MDRD: 22 ML/MIN/1.73
GLUCOSE BLDC GLUCOMTR-MCNC: 128 MG/DL (ref 70–130)
GLUCOSE BLDC GLUCOMTR-MCNC: 201 MG/DL (ref 70–130)
GLUCOSE BLDC GLUCOMTR-MCNC: 214 MG/DL (ref 70–130)
GLUCOSE BLDC GLUCOMTR-MCNC: 314 MG/DL (ref 70–130)
GLUCOSE SERPL-MCNC: 334 MG/DL (ref 65–99)
HCT VFR BLD AUTO: 23.9 % (ref 34–46.6)
HGB BLD-MCNC: 7.5 G/DL (ref 12–15.9)
IMM GRANULOCYTES # BLD AUTO: 0.09 10*3/MM3 (ref 0–0.05)
IMM GRANULOCYTES NFR BLD AUTO: 0.9 % (ref 0–0.5)
LYMPHOCYTES # BLD AUTO: 1.46 10*3/MM3 (ref 0.7–3.1)
LYMPHOCYTES NFR BLD AUTO: 15 % (ref 19.6–45.3)
MCH RBC QN AUTO: 27.9 PG (ref 26.6–33)
MCHC RBC AUTO-ENTMCNC: 31.4 G/DL (ref 31.5–35.7)
MCV RBC AUTO: 88.8 FL (ref 79–97)
MONOCYTES # BLD AUTO: 0.95 10*3/MM3 (ref 0.1–0.9)
MONOCYTES NFR BLD AUTO: 9.8 % (ref 5–12)
NEUTROPHILS NFR BLD AUTO: 7.09 10*3/MM3 (ref 1.7–7)
NEUTROPHILS NFR BLD AUTO: 72.9 % (ref 42.7–76)
NRBC BLD AUTO-RTO: 0 /100 WBC (ref 0–0.2)
PHOSPHATE SERPL-MCNC: 2.5 MG/DL (ref 2.5–4.5)
PLATELET # BLD AUTO: 233 10*3/MM3 (ref 140–450)
PMV BLD AUTO: 11.5 FL (ref 6–12)
POTASSIUM SERPL-SCNC: 4.2 MMOL/L (ref 3.5–5.2)
RBC # BLD AUTO: 2.69 10*6/MM3 (ref 3.77–5.28)
SODIUM SERPL-SCNC: 137 MMOL/L (ref 136–145)
WBC NRBC COR # BLD: 9.73 10*3/MM3 (ref 3.4–10.8)

## 2021-12-12 PROCEDURE — 63710000001 INSULIN GLARGINE PER 5 UNITS: Performed by: INTERNAL MEDICINE

## 2021-12-12 PROCEDURE — 25010000002 CEFTRIAXONE PER 250 MG: Performed by: INTERNAL MEDICINE

## 2021-12-12 PROCEDURE — 80069 RENAL FUNCTION PANEL: CPT | Performed by: STUDENT IN AN ORGANIZED HEALTH CARE EDUCATION/TRAINING PROGRAM

## 2021-12-12 PROCEDURE — 82962 GLUCOSE BLOOD TEST: CPT

## 2021-12-12 PROCEDURE — 63710000001 INSULIN LISPRO (HUMAN) PER 5 UNITS: Performed by: STUDENT IN AN ORGANIZED HEALTH CARE EDUCATION/TRAINING PROGRAM

## 2021-12-12 PROCEDURE — 85025 COMPLETE CBC W/AUTO DIFF WBC: CPT | Performed by: STUDENT IN AN ORGANIZED HEALTH CARE EDUCATION/TRAINING PROGRAM

## 2021-12-12 PROCEDURE — 25010000002 HEPARIN (PORCINE) PER 1000 UNITS: Performed by: INTERNAL MEDICINE

## 2021-12-12 PROCEDURE — 63710000001 INSULIN LISPRO (HUMAN) PER 5 UNITS: Performed by: INTERNAL MEDICINE

## 2021-12-12 RX ORDER — INSULIN LISPRO 100 [IU]/ML
2 INJECTION, SOLUTION INTRAVENOUS; SUBCUTANEOUS
Status: DISCONTINUED | OUTPATIENT
Start: 2021-12-12 | End: 2021-12-17 | Stop reason: HOSPADM

## 2021-12-12 RX ORDER — INSULIN GLARGINE 100 [IU]/ML
5 INJECTION, SOLUTION SUBCUTANEOUS EVERY MORNING
Status: DISCONTINUED | OUTPATIENT
Start: 2021-12-12 | End: 2021-12-15

## 2021-12-12 RX ORDER — LEVOTHYROXINE SODIUM 0.12 MG/1
125 TABLET ORAL
Status: DISCONTINUED | OUTPATIENT
Start: 2021-12-13 | End: 2021-12-17 | Stop reason: HOSPADM

## 2021-12-12 RX ADMIN — LEVOTHYROXINE SODIUM 150 MCG: 0.15 TABLET ORAL at 05:51

## 2021-12-12 RX ADMIN — PANTOPRAZOLE SODIUM 40 MG: 40 TABLET, DELAYED RELEASE ORAL at 08:48

## 2021-12-12 RX ADMIN — METOPROLOL TARTRATE 25 MG: 25 TABLET, FILM COATED ORAL at 08:48

## 2021-12-12 RX ADMIN — HYDRALAZINE HYDROCHLORIDE 25 MG: 50 TABLET, FILM COATED ORAL at 15:42

## 2021-12-12 RX ADMIN — TAMSULOSIN HYDROCHLORIDE 0.4 MG: 0.4 CAPSULE ORAL at 08:47

## 2021-12-12 RX ADMIN — SODIUM BICARBONATE 1300 MG: 650 TABLET ORAL at 21:27

## 2021-12-12 RX ADMIN — INSULIN LISPRO 3 UNITS: 100 INJECTION, SOLUTION INTRAVENOUS; SUBCUTANEOUS at 12:52

## 2021-12-12 RX ADMIN — INSULIN LISPRO 5 UNITS: 100 INJECTION, SOLUTION INTRAVENOUS; SUBCUTANEOUS at 08:46

## 2021-12-12 RX ADMIN — HYDRALAZINE HYDROCHLORIDE 25 MG: 50 TABLET, FILM COATED ORAL at 05:51

## 2021-12-12 RX ADMIN — Medication 250 MG: at 08:47

## 2021-12-12 RX ADMIN — SODIUM CHLORIDE, PRESERVATIVE FREE 10 ML: 5 INJECTION INTRAVENOUS at 08:48

## 2021-12-12 RX ADMIN — HYDRALAZINE HYDROCHLORIDE 25 MG: 50 TABLET, FILM COATED ORAL at 21:27

## 2021-12-12 RX ADMIN — ROPINIROLE HYDROCHLORIDE 0.75 MG: 0.5 TABLET, FILM COATED ORAL at 21:27

## 2021-12-12 RX ADMIN — HEPARIN SODIUM 5000 UNITS: 5000 INJECTION INTRAVENOUS; SUBCUTANEOUS at 21:27

## 2021-12-12 RX ADMIN — INSULIN GLARGINE 5 UNITS: 100 INJECTION, SOLUTION SUBCUTANEOUS at 12:50

## 2021-12-12 RX ADMIN — SODIUM CHLORIDE, PRESERVATIVE FREE 10 ML: 5 INJECTION INTRAVENOUS at 21:29

## 2021-12-12 RX ADMIN — METOPROLOL TARTRATE 25 MG: 25 TABLET, FILM COATED ORAL at 21:27

## 2021-12-12 RX ADMIN — SODIUM BICARBONATE 1300 MG: 650 TABLET ORAL at 15:42

## 2021-12-12 RX ADMIN — MULTIPLE VITAMINS W/ MINERALS TAB 1 TABLET: TAB at 08:47

## 2021-12-12 RX ADMIN — LINAGLIPTIN 5 MG: 5 TABLET, FILM COATED ORAL at 08:47

## 2021-12-12 RX ADMIN — HEPARIN SODIUM 5000 UNITS: 5000 INJECTION INTRAVENOUS; SUBCUTANEOUS at 05:51

## 2021-12-12 RX ADMIN — NYSTATIN 500000 UNITS: 100000 SUSPENSION ORAL at 21:27

## 2021-12-12 RX ADMIN — HEPARIN SODIUM 5000 UNITS: 5000 INJECTION INTRAVENOUS; SUBCUTANEOUS at 15:46

## 2021-12-12 RX ADMIN — ASPIRIN 81 MG: 81 TABLET, CHEWABLE ORAL at 08:47

## 2021-12-12 RX ADMIN — OLANZAPINE 5 MG: 5 TABLET ORAL at 08:56

## 2021-12-12 RX ADMIN — CEFTRIAXONE 1 G: 1 INJECTION, POWDER, FOR SOLUTION INTRAMUSCULAR; INTRAVENOUS at 07:10

## 2021-12-12 RX ADMIN — OLANZAPINE 10 MG: 10 TABLET ORAL at 21:27

## 2021-12-12 RX ADMIN — SODIUM BICARBONATE 1300 MG: 650 TABLET ORAL at 08:47

## 2021-12-12 RX ADMIN — FOLIC ACID 1 MG: 1 TABLET ORAL at 08:47

## 2021-12-12 RX ADMIN — INSULIN LISPRO 2 UNITS: 100 INJECTION, SOLUTION INTRAVENOUS; SUBCUTANEOUS at 12:52

## 2021-12-12 RX ADMIN — HYDROCODONE BITARTRATE AND ACETAMINOPHEN 1 TABLET: 5; 325 TABLET ORAL at 17:01

## 2021-12-12 RX ADMIN — Medication 5 MG: at 21:27

## 2021-12-12 NOTE — PROGRESS NOTES
[facility] Medicine Services  PROGRESS NOTE    Patient Name: Zaina Martinez  : 1965  MRN: [id number]    Date of Admission: 2021  Primary Care Physician: Provider, No Known    Subjective   Subjective     CC:  Follow-up renal failure and confusion.    HPI:  Patient much calmer today.  She is currently resting in bed and much less agitated.  Her daughter is at the bedside and is very pleased with her progress.  Patient notes she is quite weak.  She is agreeable to try to continue to work hard with physical therapy.  Reportedly she still has some mild agitation at times but this is much improved.  Patient denies any new complaints.  She is currently oriented however poor historian and somewhat flight of thought.    Review of Systems  No current fevers or chills  No current shortness of breath or cough  No current nausea, vomiting, or diarrhea  No current chest pain or palpitations    Objective   Objective     Vital Signs:   Temp:  [98.5 °F (36.9 °C)-99.2 °F (37.3 °C)] 99.2 °F (37.3 °C)  Heart Rate:  [63-79] 71  Resp:  [16-20] 16  BP: (111-150)/(55-79) 150/73  Total (NIH Stroke Scale): 2     Physical Exam:  Constitutional:Awake, alert  HENT: NCAT, mucous membranes moist, neck supple  Respiratory: Clear to auscultation bilaterally, respiratory effort normal, nonlabored breathing   Cardiovascular: RRR, normal radial pulses  Gastrointestinal: Positive bowel sounds, soft, nontender, nondistended  Musculoskeletal: Normal musculature for age, minimal lower extremity edema, BMI 25  Psychiatric: More calm than previous but anxious affect, cooperative, conversational  Neurologic:  Oriented,  Moving all extremities, no slurred speech or facial droop, follows commands  Skin: No rashes or jaundice, warm    Results Reviewed:  Results from last 7 days   Lab Units 21  0602 21  0511 12/10/21  0604   WBC 10*3/mm3 9.73 12.43* 13.08*   HEMOGLOBIN g/dL 7.5* 7.7* 8.3*   HEMATOCRIT % 23.9* 23.9* 25.0*   PLATELETS  10*3/mm3 233 230 250     Results from last 7 days   Lab Units 12/12/21  0602 12/11/21  0511 12/10/21  0604   SODIUM mmol/L 137 136 138   POTASSIUM mmol/L 4.2 4.1 4.5   CHLORIDE mmol/L 104 103 104   CO2 mmol/L 24.4 23.4 21.2*   BUN mg/dL 19 24* 16   CREATININE mg/dL 2.34* 3.03* 2.19*   GLUCOSE mg/dL 334* 202* 117*   CALCIUM mg/dL 7.6* 7.8* 8.0*     Estimated Creatinine Clearance: 23.1 mL/min (A) (by C-G formula based on SCr of 2.34 mg/dL (H)).    Microbiology Results Abnormal     Procedure Component Value - Date/Time    COVID PRE-OP / PRE-PROCEDURE SCREENING ORDER (NO ISOLATION) - Swab, Nasopharynx [[id number]]  (Normal) Collected: 12/06/21 0612    Lab Status: Final result Specimen: Swab from Nasopharynx Updated: 12/06/21 0648    Narrative:      The following orders were created for panel order COVID PRE-OP / PRE-PROCEDURE SCREENING ORDER (NO ISOLATION) - Swab, Nasopharynx.  Procedure                               Abnormality         Status                     ---------                               -----------         ------                     COVID-19,[facility] IN-HOUSE...[[id number]]  Normal              Final result                 Please view results for these tests on the individual orders.    COVID-19,[facility] IN-HOUSE CEPHEID/MARTY NP SWAB IN TRANSPORT MEDIA 8-12 HR TAT - Swab, Nasopharynx [[id number]]  (Normal) Collected: 12/06/21 0612    Lab Status: Final result Specimen: Swab from Nasopharynx Updated: 12/06/21 0648     COVID19 Not Detected    Narrative:      Fact sheet for providers: https://www.fda.gov/media/342732/download    Fact sheet for patients: https://www.fda.gov/media/503646/download    Test performed by PCR.    Clostridium Difficile Toxin, PCR - Stool, Per Rectum [[id number]]  (Normal) Collected: 12/02/21 0813    Lab Status: Final result Specimen: Stool from Per Rectum Updated: 12/02/21 0913     C. Difficile Toxins by PCR Negative    COVID PRE-OP / PRE-PROCEDURE SCREENING ORDER (NO ISOLATION) - Swab,  Nasopharynx [[id number]]  (Normal) Collected: 11/29/21 2343    Lab Status: Final result Specimen: Swab from Nasopharynx Updated: 11/30/21 0444    Narrative:      The following orders were created for panel order COVID PRE-OP / PRE-PROCEDURE SCREENING ORDER (NO ISOLATION) - Swab, Nasopharynx.  Procedure                               Abnormality         Status                     ---------                               -----------         ------                     COVID-19,APTIMA PANTHER(...[[id number]]  Normal              Final result                 Please view results for these tests on the individual orders.    COVID-19,APTIMA PANTHER(MED),[facility], NP/OP SWAB IN UTM/VTM/SALINE TRANSPORT MEDIA,24 HR TAT - Swab, Nasopharynx [[id number]]  (Normal) Collected: 11/29/21 2343    Lab Status: Final result Specimen: Swab from Nasopharynx Updated: 11/30/21 0444     COVID19 Not Detected    Narrative:      Fact sheet for providers: https://www.fda.gov/media/041141/download     Fact sheet for patients: https://www.fda.gov/media/497880/download    Test performed by RT PCR.          Imaging Results (Last 24 Hours)     Procedure Component Value Units Date/Time    XR Chest 1 View [[id number]] Collected: 12/11/21 1313     Updated: 12/11/21 1317    Narrative:      PORTABLE CHEST 12/11/2021 AT 12:40 PM     CLINICAL HISTORY: Confusion. Leukocytosis. Intermittent cough.     Compared to a previous chest dated 12/06/2021.     Right internal jugular dialysis catheter remains in place in  satisfactory position without change. The lungs are better inflated than  on the previous chest x-ray. There remain free of focal infiltrates. A  tiny left pleural effusion is suspected. The heart is normal in size.  The pulmonary vasculature limits.     This report was finalized on 12/11/2021 1:14 PM by Dr. Darien Barron M.D.             Results for orders placed during the hospital encounter of 11/29/21    Adult Transthoracic Echo Complete W/ Cont if  Necessary Per Protocol    Interpretation Summary  · Calculated left ventricular EF = 61.8% Estimated left ventricular EF was in agreement with the calculated left ventricular EF. Left ventricular systolic function is normal.  · Left ventricular diastolic function is consistent with (grade II w/high LAP) pseudonormalization.  · The left atrial cavity is mildly dilated.  · Trace tricuspid valve regurgitation is present. Calculated right ventricular systolic pressure from tricuspid regurgitation is 21.4 mmHg.      I have reviewed the medications:  Scheduled Meds:aspirin, 81 mg, Oral, Daily  cefTRIAXone, 1 g, Intravenous, Q24H  epoetin brooke/brooke-epbx, 4,000 Units, Subcutaneous, Once per day on Mon Wed Fri  sodium ferric gluconate complex IVPB in 100 mL NS, 125 mg, Intravenous, Q48H  folic acid, 1 mg, Oral, Daily  heparin (porcine), 5,000 Units, Subcutaneous, Q8H  hydrALAZINE, 25 mg, Oral, Q8H  insulin lispro, 0-7 Units, Subcutaneous, TID AC  levothyroxine, 150 mcg, Oral, Q AM  linagliptin, 5 mg, Oral, Daily  melatonin, 5 mg, Oral, Nightly  metoprolol tartrate, 25 mg, Oral, Q12H  multivitamin with minerals, 1 tablet, Oral, Daily  OLANZapine, 10 mg, Oral, Nightly  OLANZapine, 5 mg, Oral, Daily  pantoprazole, 40 mg, Oral, Daily  rOPINIRole, 0.75 mg, Oral, Nightly  sodium bicarbonate, 1,300 mg, Oral, TID  sodium chloride, 10 mL, Intravenous, Q12H  tamsulosin, 0.4 mg, Oral, Daily  thiamine, 250 mg, Oral, Daily      Continuous Infusions:sodium chloride, 9 mL/hr, Last Rate: 9 mL/hr (12/06/21 1210)      PRN Meds:.•  acetaminophen **OR** [DISCONTINUED] acetaminophen **OR** [DISCONTINUED] acetaminophen  •  albumin human  •  dextrose  •  dextrose  •  glucagon (human recombinant)  •  HYDROcodone-acetaminophen  •  labetalol  •  loperamide  •  nitroglycerin  •  OLANZapine  •  prochlorperazine  •  sodium chloride  •  sodium chloride    Assessment/Plan   Assessment & Plan     Active Hospital Problems    Diagnosis  POA   • **MAYUR (acute  kidney injury) ([facility]) [N17.9]  Yes   • Superficial thrombophlebitis [I80.9]  Clinically Undetermined   • Urine retention [R33.9]  Yes   • Klebsiella infection [A49.8]  Yes   • PRES (posterior reversible encephalopathy syndrome) [I67.83]  Yes   • Enteritis [K52.9]  Clinically Undetermined   • Medically noncompliant [Z91.19]  Not Applicable   • Myocardial infarction due to demand ischemia ([facility]) [I21.A1]  Yes   • Angioedema [T78.3XXA]  Yes   • Anemia [D64.9]  Yes   • HTN (hypertension) [I10]  Yes   • Hypothyroidism [E03.9]  Yes   • Type 2 diabetes mellitus with hyperglycemia, with long-term current use of insulin ([facility]) [E11.65, Z79.4]  Not Applicable   • Rheumatoid arthritis ([facility]) [M06.9]  Yes      Resolved Hospital Problems    Diagnosis Date Resolved POA   • Hypertensive urgency [I16.0] 11/30/2021 Yes        Brief Hospital Course to date:  Zaina Martinez is a 56 y.o. female presents with acute kidney injury, enteritis, hypothyroidism, and acute confusion felt to be related to PRES.       Discussion/plan for today:  Repeat urinalysis reviewed.  Restart ceftriaxone for at least 3 more doses due to potentially uncleared urinary tract infection.  Plan to try to reculture if possible however sometimes it is difficult to grow after antibiotic treatment given.  Continue to follow blood cultures so far negative.  Seems to be responding to Zyprexa.  Keep off SSRI for now.  Likely having psychosis from possible undiagnosed bipolar which entered a manic phase.  PRES  was diagnosed by neurology earlier in the hospital stay.  It is unclear how much this is contributing.  Attempting to avoid fluctuations of blood pressure if possible.  Free T4 reviewed, Synthroid reduced in dose.  Insulin adjusted today.     Renal failure:  Now on dialysis, new this admission.     Psychosis, possible undiagnosed bipolar syndrome with acute psychotic manic phase:   feels patient has undiagnosed bipolar and has had previous episode of psychosis at  her last hospitalization when she had COVID-19.  Plan for mood stabilizer, patient has required restraints.  Sitter as needed.  Supportive care and symptom treatment and injury prevention needed.    PRES:   Diagnosed by neurology on MRI.  Working to gently regulate blood pressure.    Hypothyroidism: TSH is severely elevated at 152.    Patient must be noncompliant with thyroid medicine.  Synthroid adjusted.  Recommend she have free T4 and TSH within 1 month to continue to monitor.  Patient admits to missing her thyroid medication.  I explained to her that this is a very important medication.     Enteritis and UTI:  Abdominal symptoms resolved.  Completed course of ceftriaxone.     Diabetes: Monitor glucose and adjust as needed.  A1c 9.8.  Poorly controlled.  Started Tradjenta.  Correction insulin.     Demand ischemia: Medical management.  Cardiology has evaluated.  Normal echocardiogram.  Needs outpatient follow-up with cardiology.  She agrees with this plan.     Severe anemia:  Status post transfusion.  Hemoccult negative.  No active bleeding reported.  Normocytic.  Mostly consistent with anemia of chronic disease or chronic kidney disease.    B12 relatively normal.  Folic acid is borderline low.  Started Folic acid supplements.    Urine retention: Plan to trial Flomax.  Catheterize as needed.     DVT Prophylaxis: Mechanical    Disposition: Pending    CODE STATUS:   Code Status and Medical Interventions:   Ordered at: 11/30/21 0115     Code Status (Patient has no pulse and is not breathing):    CPR (Attempt to Resuscitate)     Medical Interventions (Patient has pulse or is breathing):    Full Support       Osmar Alva MD  12/12/21

## 2021-12-12 NOTE — PLAN OF CARE
Goal Outcome Evaluation:              Outcome Summary: VSS. Pt recived dialysis this morning. Pt alert and oriented today. Recognized and conversed with  throughout day. Sitter at bedside. No complaints.Will continue to monitor.

## 2021-12-12 NOTE — PROGRESS NOTES
"   LOS: 13 days    Patient Care Team:  Provider, No Known as PCP - General    Chief Complaint:    Chief Complaint   Patient presents with   • Altered Mental Status     Follow UP MAYUR CKD4  Subjective     Interval History:   Underwent dialysis yesterday    Objective     Vital Signs  Temp:  [98.5 °F (36.9 °C)-99.2 °F (37.3 °C)] 99.2 °F (37.3 °C)  Heart Rate:  [63-79] 71  Resp:  [16-20] 16  BP: (111-150)/(55-79) 150/73    Flowsheet Rows      First Filed Value   Admission Height 157.5 cm (62\") Documented at 11/29/2021 2011   Admission Weight 59 kg (130 lb) Documented at 11/30/2021 0200          I/O this shift:  In: 450 [P.O.:450]  Out: -   I/O last 3 completed shifts:  In: 990 [P.O.:990]  Out: 475 [Urine:75; Other:400]    Intake/Output Summary (Last 24 hours) at 12/12/2021 0930  Last data filed at 12/12/2021 0908  Gross per 24 hour   Intake 1440 ml   Output 475 ml   Net 965 ml       Physical Exam:  General Appearance:  Confused poorly responsive to me today  Neck supple no JVD  Lungs CTA bilat no rales  CV RRR no m/g  abd soft NT/ND  No pedal/ankle edema                   Results Review:    Results from last 7 days   Lab Units 12/12/21  0602 12/11/21  0511 12/10/21  0604   SODIUM mmol/L 137 136 138   POTASSIUM mmol/L 4.2 4.1 4.5   CHLORIDE mmol/L 104 103 104   CO2 mmol/L 24.4 23.4 21.2*   BUN mg/dL 19 24* 16   CREATININE mg/dL 2.34* 3.03* 2.19*   CALCIUM mg/dL 7.6* 7.8* 8.0*   GLUCOSE mg/dL 334* 202* 117*       Estimated Creatinine Clearance: 23.1 mL/min (A) (by C-G formula based on SCr of 2.34 mg/dL (H)).    Results from last 7 days   Lab Units 12/12/21  0602 12/11/21  0511 12/10/21  0604   PHOSPHORUS mg/dL 2.5 4.6* 3.9             Results from last 7 days   Lab Units 12/12/21  0602 12/11/21  0511 12/10/21  0604 12/09/21  0618 12/08/21  0552   WBC 10*3/mm3 9.73 12.43* 13.08* 12.13* 11.26*   HEMOGLOBIN g/dL 7.5* 7.7* 8.3* 8.3* 8.2*   PLATELETS 10*3/mm3 233 230 250 257 239               Imaging Results (Last 24 Hours)     " Procedure Component Value Units Date/Time    XR Chest 1 View [[id number]] Collected: 12/11/21 1313     Updated: 12/11/21 1317    Narrative:      PORTABLE CHEST 12/11/2021 AT 12:40 PM     CLINICAL HISTORY: Confusion. Leukocytosis. Intermittent cough.     Compared to a previous chest dated 12/06/2021.     Right internal jugular dialysis catheter remains in place in  satisfactory position without change. The lungs are better inflated than  on the previous chest x-ray. There remain free of focal infiltrates. A  tiny left pleural effusion is suspected. The heart is normal in size.  The pulmonary vasculature limits.     This report was finalized on 12/11/2021 1:14 PM by Dr. Darien Barron M.D.           aspirin, 81 mg, Oral, Daily  cefTRIAXone, 1 g, Intravenous, Q24H  epoetin brooke/brooke-epbx, 4,000 Units, Subcutaneous, Once per day on Mon Wed Fri  sodium ferric gluconate complex IVPB in 100 mL NS, 125 mg, Intravenous, Q48H  folic acid, 1 mg, Oral, Daily  heparin (porcine), 5,000 Units, Subcutaneous, Q8H  hydrALAZINE, 25 mg, Oral, Q8H  insulin lispro, 0-7 Units, Subcutaneous, TID AC  levothyroxine, 150 mcg, Oral, Q AM  linagliptin, 5 mg, Oral, Daily  melatonin, 5 mg, Oral, Nightly  metoprolol tartrate, 25 mg, Oral, Q12H  multivitamin with minerals, 1 tablet, Oral, Daily  OLANZapine, 10 mg, Oral, Nightly  OLANZapine, 5 mg, Oral, Daily  pantoprazole, 40 mg, Oral, Daily  rOPINIRole, 0.75 mg, Oral, Nightly  sodium bicarbonate, 1,300 mg, Oral, TID  sodium chloride, 10 mL, Intravenous, Q12H  tamsulosin, 0.4 mg, Oral, Daily  thiamine, 250 mg, Oral, Daily      sodium chloride, 9 mL/hr, Last Rate: 9 mL/hr (12/06/21 1210)        Medication Review:   Current Facility-Administered Medications   Medication Dose Route Frequency Provider Last Rate Last Admin   • acetaminophen (TYLENOL) tablet 650 mg  650 mg Oral Q4H PRN Keli Salazar MD   650 mg at 12/07/21 1351   • albumin human 25 % IV SOLN 12.5 g  12.5 g Intravenous PRN  Reynaldo Sotelo MD       • aspirin chewable tablet 81 mg  81 mg Oral Daily Keli Salazar MD   81 mg at 12/12/21 0847   • cefTRIAXone (ROCEPHIN) 1 g in sodium chloride 0.9 % 100 mL IVPB-VTB  1 g Intravenous Q24H Osmar Alva  mL/hr at 12/12/21 0710 1 g at 12/12/21 0710   • dextrose (D50W) (25 g/50 mL) IV injection 25 g  25 g Intravenous Q15 Min PRN Keli Salazar MD   25 g at 12/08/21 1223   • dextrose (GLUTOSE) oral gel 15 g  15 g Oral Q15 Min PRN Keli Salazar MD   15 g at 12/08/21 1502   • epoetin brooke-epbx (RETACRIT) injection 4,000 Units  4,000 Units Subcutaneous Once per day on Mon Wed Fri Reynaldo Sotelo MD   4,000 Units at 12/10/21 1845   • ferric gluconate (FERRLECIT) 125 mg in sodium chloride 0.9 % 100 mL IVPB  125 mg Intravenous Q48H Reynaldo Sotelo  mL/hr at 12/11/21 1424 125 mg at 12/11/21 1424   • folic acid (FOLVITE) tablet 1 mg  1 mg Oral Daily Keli Salazar MD   1 mg at 12/12/21 0847   • glucagon (human recombinant) (GLUCAGEN DIAGNOSTIC) injection 1 mg  1 mg Subcutaneous PRN Keli Salazar MD       • heparin (porcine) 5000 UNIT/ML injection 5,000 Units  5,000 Units Subcutaneous Q8H Osmar Alva MD   5,000 Units at 12/12/21 0551   • hydrALAZINE (APRESOLINE) tablet 25 mg  25 mg Oral Q8H Keli Salazar MD   25 mg at 12/12/21 0551   • HYDROcodone-acetaminophen (NORCO) 5-325 MG per tablet 1 tablet  1 tablet Oral Q6H PRN Osmar Alva MD       • insulin lispro (ADMELOG) injection 0-7 Units  0-7 Units Subcutaneous TID AC Keli Salazar MD   5 Units at 12/12/21 0846   • labetalol (NORMODYNE,TRANDATE) injection 10 mg  10 mg Intravenous Q6H PRN Keli Salazar MD   10 mg at 12/08/21 1246   • levothyroxine (SYNTHROID, LEVOTHROID) tablet 150 mcg  150 mcg Oral Q AM Osmar Alva MD   150 mcg at 12/12/21 0551   • linagliptin (TRADJENTA) tablet 5 mg  5 mg Oral Daily Keli Salazar MD   5 mg  at 12/12/21 0847   • loperamide (IMODIUM) capsule 2 mg  2 mg Oral 4x Daily PRN Keli Salazar MD   2 mg at 12/03/21 1519   • melatonin tablet 5 mg  5 mg Oral Nightly Osmar Alva MD   5 mg at 12/11/21 2019   • metoprolol tartrate (LOPRESSOR) tablet 25 mg  25 mg Oral Q12H Keli Salazar MD   25 mg at 12/12/21 0848   • multivitamin with minerals 1 tablet  1 tablet Oral Daily Osmar Alva MD   1 tablet at 12/12/21 0847   • nitroglycerin (NITROSTAT) SL tablet 0.4 mg  0.4 mg Sublingual Q5 Min PRN Keli Salazar MD       • OLANZapine (zyPREXA) injection 5 mg  5 mg Intramuscular Q8H PRN Yordan Suero III, MD   5 mg at 12/10/21 1851   • OLANZapine (zyPREXA) tablet 10 mg  10 mg Oral Nightly Osmar Alva MD   10 mg at 12/11/21 2019   • OLANZapine (zyPREXA) tablet 5 mg  5 mg Oral Daily Osmar Alva MD   5 mg at 12/12/21 0856   • pantoprazole (PROTONIX) EC tablet 40 mg  40 mg Oral Daily Keli Salazar MD   40 mg at 12/12/21 0848   • prochlorperazine (COMPAZINE) injection 5 mg  5 mg Intravenous Q6H PRN Keli Salazar MD       • rOPINIRole (REQUIP) tablet 0.75 mg  0.75 mg Oral Nightly Keli Salazar MD   0.75 mg at 12/11/21 2019   • sodium bicarbonate tablet 1,300 mg  1,300 mg Oral TID Keli Salazar MD   1,300 mg at 12/12/21 0847   • sodium chloride 0.9 % flush 10 mL  10 mL Intravenous Q12H Keli Salazar MD   10 mL at 12/12/21 0848   • sodium chloride 0.9 % flush 10 mL  10 mL Intravenous PRN Keli Salazar MD       • sodium chloride 0.9 % infusion  9 mL/hr Intravenous Continuous PRN Keli Salazar MD 9 mL/hr at 12/06/21 1210 9 mL/hr at 12/06/21 1210   • tamsulosin (FLOMAX) 24 hr capsule 0.4 mg  0.4 mg Oral Daily Osmar Alva MD   0.4 mg at 12/12/21 0847   • thiamine (VITAMIN B-1) tablet 250 mg  250 mg Oral Daily Osmar Alva MD   250 mg at 12/12/21 0847       Assessment/Plan   1.  MAYUR,  CKD stage 4 now dialysis dependent due to possible uremic symptoms.  Had 3 dialysis sessions so far.  Last dialysis was on 12/9/2021 with no reported complication.  2.   Confusion.  Multifactorial secondary possible to metabolic encephalopathy in addition to press.  Patient had finished her course of antibiotic therapy for UTI with no improvement of her mental status.  Dialysis did not improve her mental condition.  Neurology signed off.  3.  DM2 with poor control  4.  Anemia secondary to iron deficiency anemia/CKD.  Ferritin was 100 and her saturation of 21  5.  Hypertension blood pressure is soft lately  6.  Type II non-NSTEMI  7.  Severe hypothyroidism.  On replacement  8.  Medication noncompliance  9.  Diarrhea, resolved   10.NAGMA -improving    Plan  We will continue dialysis on a Tuesday Thursday Saturday schedule.  Give IV iron on HD.  Continue ANDREA.  Continue to hold amlodipine for borderline hemodynamics        MAYUR (acute kidney injury) (HCC)    HTN (hypertension)    Hypothyroidism    Type 2 diabetes mellitus with hyperglycemia, with long-term current use of insulin (HCC)    Rheumatoid arthritis (HCC)    Angioedema    Anemia    Medically noncompliant    Myocardial infarction due to demand ischemia (HCC)    Enteritis    PRES (posterior reversible encephalopathy syndrome)    Urine retention    Klebsiella infection    Superficial thrombophlebitis              Didi Mcleod MD  12/12/21  09:30 EST

## 2021-12-12 NOTE — PLAN OF CARE
Problem: Adult Inpatient Plan of Care  Goal: Plan of Care Review  Outcome: Ongoing, Progressing  Flowsheets (Taken 12/12/2021 6191)  Progress: improving  Plan of Care Reviewed With: patient  Outcome Summary: VSS. On 2L of O2 throughout night. Pt rested throughout the night, woke up a few times saying she felt anxious. AA&O x4. Sitter D/C. Pt had multiple loose stools throughout the night- Giovani Islas aware and ordered C. diff R/O. Pt denies abdominal cramping. Pts  at beside at being of shift and attentive to pt. Pt stable and needs met at this time.

## 2021-12-13 LAB
ALBUMIN SERPL-MCNC: 1.6 G/DL (ref 3.5–5.2)
ANION GAP SERPL CALCULATED.3IONS-SCNC: 5.9 MMOL/L (ref 5–15)
BASOPHILS # BLD AUTO: 0.06 10*3/MM3 (ref 0–0.2)
BASOPHILS NFR BLD AUTO: 0.6 % (ref 0–1.5)
BUN SERPL-MCNC: 31 MG/DL (ref 6–20)
BUN/CREAT SERPL: 9.6 (ref 7–25)
CALCIUM SPEC-SCNC: 7.4 MG/DL (ref 8.6–10.5)
CHLORIDE SERPL-SCNC: 106 MMOL/L (ref 98–107)
CO2 SERPL-SCNC: 26.1 MMOL/L (ref 22–29)
CREAT SERPL-MCNC: 3.22 MG/DL (ref 0.57–1)
DEPRECATED RDW RBC AUTO: 44.5 FL (ref 37–54)
EOSINOPHIL # BLD AUTO: 0.16 10*3/MM3 (ref 0–0.4)
EOSINOPHIL NFR BLD AUTO: 1.7 % (ref 0.3–6.2)
ERYTHROCYTE [DISTWIDTH] IN BLOOD BY AUTOMATED COUNT: 14.4 % (ref 12.3–15.4)
GFR SERPL CREATININE-BSD FRML MDRD: 15 ML/MIN/1.73
GLUCOSE BLDC GLUCOMTR-MCNC: 119 MG/DL (ref 70–130)
GLUCOSE BLDC GLUCOMTR-MCNC: 144 MG/DL (ref 70–130)
GLUCOSE BLDC GLUCOMTR-MCNC: 164 MG/DL (ref 70–130)
GLUCOSE BLDC GLUCOMTR-MCNC: 171 MG/DL (ref 70–130)
GLUCOSE SERPL-MCNC: 136 MG/DL (ref 65–99)
HCT VFR BLD AUTO: 20.2 % (ref 34–46.6)
HCT VFR BLD AUTO: 24.2 % (ref 34–46.6)
HGB BLD-MCNC: 6.7 G/DL (ref 12–15.9)
HGB BLD-MCNC: 8 G/DL (ref 12–15.9)
IMM GRANULOCYTES # BLD AUTO: 0.11 10*3/MM3 (ref 0–0.05)
IMM GRANULOCYTES NFR BLD AUTO: 1.1 % (ref 0–0.5)
LYMPHOCYTES # BLD AUTO: 1.63 10*3/MM3 (ref 0.7–3.1)
LYMPHOCYTES NFR BLD AUTO: 17 % (ref 19.6–45.3)
MCH RBC QN AUTO: 28.2 PG (ref 26.6–33)
MCHC RBC AUTO-ENTMCNC: 33.2 G/DL (ref 31.5–35.7)
MCV RBC AUTO: 84.9 FL (ref 79–97)
MONOCYTES # BLD AUTO: 1.01 10*3/MM3 (ref 0.1–0.9)
MONOCYTES NFR BLD AUTO: 10.5 % (ref 5–12)
NEUTROPHILS NFR BLD AUTO: 6.62 10*3/MM3 (ref 1.7–7)
NEUTROPHILS NFR BLD AUTO: 69.1 % (ref 42.7–76)
NRBC BLD AUTO-RTO: 0.1 /100 WBC (ref 0–0.2)
PHOSPHATE SERPL-MCNC: 2.7 MG/DL (ref 2.5–4.5)
PLATELET # BLD AUTO: 202 10*3/MM3 (ref 140–450)
PMV BLD AUTO: 11.2 FL (ref 6–12)
POTASSIUM SERPL-SCNC: 4.5 MMOL/L (ref 3.5–5.2)
RBC # BLD AUTO: 2.38 10*6/MM3 (ref 3.77–5.28)
SODIUM SERPL-SCNC: 138 MMOL/L (ref 136–145)
WBC NRBC COR # BLD: 9.59 10*3/MM3 (ref 3.4–10.8)

## 2021-12-13 PROCEDURE — 82962 GLUCOSE BLOOD TEST: CPT

## 2021-12-13 PROCEDURE — 25010000002 EPOETIN ALFA-EPBX 4000 UNIT/ML SOLUTION: Performed by: HOSPITALIST

## 2021-12-13 PROCEDURE — 80069 RENAL FUNCTION PANEL: CPT | Performed by: STUDENT IN AN ORGANIZED HEALTH CARE EDUCATION/TRAINING PROGRAM

## 2021-12-13 PROCEDURE — 85018 HEMOGLOBIN: CPT | Performed by: INTERNAL MEDICINE

## 2021-12-13 PROCEDURE — 97530 THERAPEUTIC ACTIVITIES: CPT

## 2021-12-13 PROCEDURE — 25010000002 CEFTRIAXONE PER 250 MG: Performed by: INTERNAL MEDICINE

## 2021-12-13 PROCEDURE — 97166 OT EVAL MOD COMPLEX 45 MIN: CPT

## 2021-12-13 PROCEDURE — 63710000001 INSULIN LISPRO (HUMAN) PER 5 UNITS: Performed by: STUDENT IN AN ORGANIZED HEALTH CARE EDUCATION/TRAINING PROGRAM

## 2021-12-13 PROCEDURE — 97535 SELF CARE MNGMENT TRAINING: CPT

## 2021-12-13 PROCEDURE — 63710000001 INSULIN GLARGINE PER 5 UNITS: Performed by: INTERNAL MEDICINE

## 2021-12-13 PROCEDURE — 25010000002 HEPARIN (PORCINE) PER 1000 UNITS: Performed by: INTERNAL MEDICINE

## 2021-12-13 PROCEDURE — 85025 COMPLETE CBC W/AUTO DIFF WBC: CPT | Performed by: STUDENT IN AN ORGANIZED HEALTH CARE EDUCATION/TRAINING PROGRAM

## 2021-12-13 PROCEDURE — 85014 HEMATOCRIT: CPT | Performed by: INTERNAL MEDICINE

## 2021-12-13 PROCEDURE — 63710000001 INSULIN LISPRO (HUMAN) PER 5 UNITS: Performed by: INTERNAL MEDICINE

## 2021-12-13 RX ORDER — AMLODIPINE BESYLATE 2.5 MG/1
2.5 TABLET ORAL
Status: DISCONTINUED | OUTPATIENT
Start: 2021-12-13 | End: 2021-12-14

## 2021-12-13 RX ORDER — AMLODIPINE BESYLATE 5 MG/1
5 TABLET ORAL
Status: DISCONTINUED | OUTPATIENT
Start: 2021-12-13 | End: 2021-12-13

## 2021-12-13 RX ORDER — OLANZAPINE 5 MG/1
5 TABLET ORAL 2 TIMES DAILY
Status: DISCONTINUED | OUTPATIENT
Start: 2021-12-13 | End: 2021-12-17 | Stop reason: HOSPADM

## 2021-12-13 RX ADMIN — Medication 250 MG: at 08:29

## 2021-12-13 RX ADMIN — HYDRALAZINE HYDROCHLORIDE 25 MG: 50 TABLET, FILM COATED ORAL at 06:44

## 2021-12-13 RX ADMIN — SODIUM BICARBONATE 1300 MG: 650 TABLET ORAL at 08:29

## 2021-12-13 RX ADMIN — NYSTATIN 500000 UNITS: 100000 SUSPENSION ORAL at 08:29

## 2021-12-13 RX ADMIN — EPOETIN ALFA-EPBX 4000 UNITS: 4000 INJECTION, SOLUTION INTRAVENOUS; SUBCUTANEOUS at 08:28

## 2021-12-13 RX ADMIN — PANTOPRAZOLE SODIUM 40 MG: 40 TABLET, DELAYED RELEASE ORAL at 11:09

## 2021-12-13 RX ADMIN — HEPARIN SODIUM 5000 UNITS: 5000 INJECTION INTRAVENOUS; SUBCUTANEOUS at 06:44

## 2021-12-13 RX ADMIN — NYSTATIN 500000 UNITS: 100000 SUSPENSION ORAL at 12:37

## 2021-12-13 RX ADMIN — Medication 5 MG: at 20:12

## 2021-12-13 RX ADMIN — ROPINIROLE HYDROCHLORIDE 0.75 MG: 0.5 TABLET, FILM COATED ORAL at 20:11

## 2021-12-13 RX ADMIN — METOPROLOL TARTRATE 25 MG: 25 TABLET, FILM COATED ORAL at 20:14

## 2021-12-13 RX ADMIN — INSULIN LISPRO 2 UNITS: 100 INJECTION, SOLUTION INTRAVENOUS; SUBCUTANEOUS at 12:32

## 2021-12-13 RX ADMIN — CEFTRIAXONE 1 G: 1 INJECTION, POWDER, FOR SOLUTION INTRAMUSCULAR; INTRAVENOUS at 08:28

## 2021-12-13 RX ADMIN — LINAGLIPTIN 5 MG: 5 TABLET, FILM COATED ORAL at 08:29

## 2021-12-13 RX ADMIN — SODIUM CHLORIDE, PRESERVATIVE FREE 10 ML: 5 INJECTION INTRAVENOUS at 08:29

## 2021-12-13 RX ADMIN — SODIUM CHLORIDE, PRESERVATIVE FREE 10 ML: 5 INJECTION INTRAVENOUS at 20:12

## 2021-12-13 RX ADMIN — OLANZAPINE 5 MG: 5 TABLET ORAL at 08:29

## 2021-12-13 RX ADMIN — OLANZAPINE 5 MG: 5 TABLET ORAL at 20:11

## 2021-12-13 RX ADMIN — AMLODIPINE BESYLATE 2.5 MG: 2.5 TABLET ORAL at 17:28

## 2021-12-13 RX ADMIN — MULTIPLE VITAMINS W/ MINERALS TAB 1 TABLET: TAB at 08:29

## 2021-12-13 RX ADMIN — HEPARIN SODIUM 5000 UNITS: 5000 INJECTION INTRAVENOUS; SUBCUTANEOUS at 20:11

## 2021-12-13 RX ADMIN — SODIUM BICARBONATE 1300 MG: 650 TABLET ORAL at 14:55

## 2021-12-13 RX ADMIN — HYDRALAZINE HYDROCHLORIDE 25 MG: 50 TABLET, FILM COATED ORAL at 14:55

## 2021-12-13 RX ADMIN — INSULIN GLARGINE 5 UNITS: 100 INJECTION, SOLUTION SUBCUTANEOUS at 06:45

## 2021-12-13 RX ADMIN — INSULIN LISPRO 2 UNITS: 100 INJECTION, SOLUTION INTRAVENOUS; SUBCUTANEOUS at 08:28

## 2021-12-13 RX ADMIN — NYSTATIN 500000 UNITS: 100000 SUSPENSION ORAL at 17:28

## 2021-12-13 RX ADMIN — INSULIN LISPRO 2 UNITS: 100 INJECTION, SOLUTION INTRAVENOUS; SUBCUTANEOUS at 17:27

## 2021-12-13 RX ADMIN — LEVOTHYROXINE SODIUM 125 MCG: 0.12 TABLET ORAL at 06:44

## 2021-12-13 RX ADMIN — FOLIC ACID 1 MG: 1 TABLET ORAL at 08:29

## 2021-12-13 RX ADMIN — ACETAMINOPHEN 650 MG: 325 TABLET, FILM COATED ORAL at 20:12

## 2021-12-13 RX ADMIN — ASPIRIN 81 MG: 81 TABLET, CHEWABLE ORAL at 08:29

## 2021-12-13 RX ADMIN — NYSTATIN 500000 UNITS: 100000 SUSPENSION ORAL at 20:11

## 2021-12-13 RX ADMIN — TAMSULOSIN HYDROCHLORIDE 0.4 MG: 0.4 CAPSULE ORAL at 08:29

## 2021-12-13 RX ADMIN — HEPARIN SODIUM 5000 UNITS: 5000 INJECTION INTRAVENOUS; SUBCUTANEOUS at 14:55

## 2021-12-13 RX ADMIN — LOPERAMIDE HYDROCHLORIDE 2 MG: 2 CAPSULE ORAL at 14:55

## 2021-12-13 RX ADMIN — METOPROLOL TARTRATE 25 MG: 25 TABLET, FILM COATED ORAL at 11:09

## 2021-12-13 NOTE — PROGRESS NOTES
"   LOS: 14 days    Patient Care Team:  Provider, No Known as PCP - General    Chief Complaint:    Chief Complaint   Patient presents with   • Altered Mental Status     Follow up MAYUR CKD4  Subjective     Interval History:   Has had multiple loose stools today; no abdominal pain  Eating fine  No shortness of breath  Last HD was 12/11    Objective     Vital Signs  Temp:  [98 °F (36.7 °C)-98.8 °F (37.1 °C)] 98 °F (36.7 °C)  Heart Rate:  [60-76] 76  Resp:  [14-16] 16  BP: (103-159)/(69-81) 159/81    Flowsheet Rows      First Filed Value   Admission Height 157.5 cm (62\") Documented at 11/29/2021 2011   Admission Weight 59 kg (130 lb) Documented at 11/30/2021 0200          No intake/output data recorded.  I/O last 3 completed shifts:  In: 1260 [P.O.:1260]  Out: -     Intake/Output Summary (Last 24 hours) at 12/13/2021 1517  Last data filed at 12/13/2021 0029  Gross per 24 hour   Intake 0 ml   Output --   Net 0 ml       Physical Exam:  General Appearance:  Awake, chronically ill, pale  Neck supple no JVD  Vascular: Right chest TDC  Lungs CTA bilat no rales  CV RRR no m/g  abd soft NT/ND, BS +  No pedal/ankle edema                   Results Review:    Results from last 7 days   Lab Units 12/13/21  0627 12/12/21  0602 12/11/21  0511   SODIUM mmol/L 138 137 136   POTASSIUM mmol/L 4.5 4.2 4.1   CHLORIDE mmol/L 106 104 103   CO2 mmol/L 26.1 24.4 23.4   BUN mg/dL 31* 19 24*   CREATININE mg/dL 3.22* 2.34* 3.03*   CALCIUM mg/dL 7.4* 7.6* 7.8*   GLUCOSE mg/dL 136* 334* 202*       Estimated Creatinine Clearance: 16.8 mL/min (A) (by C-G formula based on SCr of 3.22 mg/dL (H)).    Results from last 7 days   Lab Units 12/13/21  0627 12/12/21  0602 12/11/21  0511   PHOSPHORUS mg/dL 2.7 2.5 4.6*             Results from last 7 days   Lab Units 12/13/21  1229 12/13/21  0627 12/12/21  0602 12/11/21  0511 12/10/21  0604 12/09/21  0618 12/09/21  0618   WBC 10*3/mm3  --  9.59 9.73 12.43* 13.08*  --  12.13*   HEMOGLOBIN g/dL 8.0* 6.7* 7.5* 7.7* " 8.3*   < > 8.3*   PLATELETS 10*3/mm3  --  202 233 230 250  --  257    < > = values in this interval not displayed.               Imaging Results (Last 24 Hours)     ** No results found for the last 24 hours. **        aspirin, 81 mg, Oral, Daily  cefTRIAXone, 1 g, Intravenous, Q24H  epoetin brooke/brooke-epbx, 4,000 Units, Subcutaneous, Once per day on Mon Wed Fri  [START ON 12/14/2021] sodium ferric gluconate complex IVPB in 100 mL NS, 125 mg, Intravenous, Once per day on Tue Thu Sat  folic acid, 1 mg, Oral, Daily  heparin (porcine), 5,000 Units, Subcutaneous, Q8H  hydrALAZINE, 25 mg, Oral, Q8H  insulin glargine, 5 Units, Subcutaneous, QAM  insulin lispro, 0-7 Units, Subcutaneous, TID AC  insulin lispro, 2 Units, Subcutaneous, TID With Meals  levothyroxine, 125 mcg, Oral, Q AM  linagliptin, 5 mg, Oral, Daily  melatonin, 5 mg, Oral, Nightly  metoprolol tartrate, 25 mg, Oral, Q12H  multivitamin with minerals, 1 tablet, Oral, Daily  nystatin, 5 mL, Swish & Swallow, 4x Daily  OLANZapine, 5 mg, Oral, BID  pantoprazole, 40 mg, Oral, Daily  rOPINIRole, 0.75 mg, Oral, Nightly  sodium bicarbonate, 1,300 mg, Oral, TID  sodium chloride, 10 mL, Intravenous, Q12H  tamsulosin, 0.4 mg, Oral, Daily  thiamine, 250 mg, Oral, Daily      sodium chloride, 9 mL/hr, Last Rate: 9 mL/hr (12/06/21 1210)        Medication Review:   Current Facility-Administered Medications   Medication Dose Route Frequency Provider Last Rate Last Admin   • acetaminophen (TYLENOL) tablet 650 mg  650 mg Oral Q4H PRN Keli Salazar MD   650 mg at 12/07/21 1351   • aspirin chewable tablet 81 mg  81 mg Oral Daily Keli Salazar MD   81 mg at 12/13/21 0829   • cefTRIAXone (ROCEPHIN) 1 g in sodium chloride 0.9 % 100 mL IVPB-VTB  1 g Intravenous Q24H Osmar Alva  mL/hr at 12/13/21 0828 1 g at 12/13/21 0828   • dextrose (D50W) (25 g/50 mL) IV injection 25 g  25 g Intravenous Q15 Min Keli Ruiz MD   25 g at 12/08/21 1223   •  dextrose (GLUTOSE) oral gel 15 g  15 g Oral Q15 Min PRN Keli Salazar MD   15 g at 12/08/21 1502   • epoetin brooke-epbx (RETACRIT) injection 4,000 Units  4,000 Units Subcutaneous Once per day on Mon Wed Fri Reynaldo Sotelo MD   4,000 Units at 12/13/21 0828   • [START ON 12/14/2021] ferric gluconate (FERRLECIT) 125 mg in sodium chloride 0.9 % 100 mL IVPB  125 mg Intravenous Once per day on Tue Thu Sat Reynaldo Sotelo MD       • folic acid (FOLVITE) tablet 1 mg  1 mg Oral Daily Keli Salazar MD   1 mg at 12/13/21 0829   • glucagon (human recombinant) (GLUCAGEN DIAGNOSTIC) injection 1 mg  1 mg Subcutaneous PRN Klei Salazar MD       • heparin (porcine) 5000 UNIT/ML injection 5,000 Units  5,000 Units Subcutaneous Q8H Osmar Alva MD   5,000 Units at 12/13/21 1455   • hydrALAZINE (APRESOLINE) tablet 25 mg  25 mg Oral Q8H Keli Salazar MD   25 mg at 12/13/21 1455   • HYDROcodone-acetaminophen (NORCO) 5-325 MG per tablet 1 tablet  1 tablet Oral Q6H PRN Osmar Alva MD   1 tablet at 12/12/21 1701   • insulin glargine (LANTUS, SEMGLEE) injection 5 Units  5 Units Subcutaneous QAM Osmar Alva MD   5 Units at 12/13/21 0645   • insulin lispro (ADMELOG) injection 0-7 Units  0-7 Units Subcutaneous TID AC Keli Salazar MD   2 Units at 12/13/21 1232   • insulin lispro (ADMELOG) injection 2 Units  2 Units Subcutaneous TID With Meals Osmar Alva MD   2 Units at 12/13/21 1232   • labetalol (NORMODYNE,TRANDATE) injection 10 mg  10 mg Intravenous Q6H PRN Keli Salazar MD   10 mg at 12/08/21 1246   • levothyroxine (SYNTHROID, LEVOTHROID) tablet 125 mcg  125 mcg Oral Q AM Osmar Alva MD   125 mcg at 12/13/21 0644   • linagliptin (TRADJENTA) tablet 5 mg  5 mg Oral Daily Keli Salazar MD   5 mg at 12/13/21 0829   • loperamide (IMODIUM) capsule 2 mg  2 mg Oral 4x Daily PRN Keli Salazar MD   2 mg at 12/13/21 2861   •  melatonin tablet 5 mg  5 mg Oral Nightly Osmar Alva MD   5 mg at 12/12/21 2127   • metoprolol tartrate (LOPRESSOR) tablet 25 mg  25 mg Oral Q12H Keli Salazar MD   25 mg at 12/13/21 1109   • multivitamin with minerals 1 tablet  1 tablet Oral Daily Osmar Alva MD   1 tablet at 12/13/21 0829   • nitroglycerin (NITROSTAT) SL tablet 0.4 mg  0.4 mg Sublingual Q5 Min PRN Keli Salazar MD       • nystatin (MYCOSTATIN) 100,000 unit/mL suspension 500,000 Units  5 mL Swish & Swallow 4x Daily Osmar Alva MD   500,000 Units at 12/13/21 1237   • OLANZapine (zyPREXA) injection 5 mg  5 mg Intramuscular Q8H PRN Yordan Suero III, MD   5 mg at 12/10/21 1851   • OLANZapine (zyPREXA) tablet 5 mg  5 mg Oral BID Osmar Alva MD       • pantoprazole (PROTONIX) EC tablet 40 mg  40 mg Oral Daily Keli Salazar MD   40 mg at 12/13/21 1109   • prochlorperazine (COMPAZINE) injection 5 mg  5 mg Intravenous Q6H PRN Keli Salazar MD       • rOPINIRole (REQUIP) tablet 0.75 mg  0.75 mg Oral Nightly Keli Salazar MD   0.75 mg at 12/12/21 2127   • sodium bicarbonate tablet 1,300 mg  1,300 mg Oral TID Keli Salazar MD   1,300 mg at 12/13/21 1455   • sodium chloride 0.9 % flush 10 mL  10 mL Intravenous Q12H Keli Salazar MD   10 mL at 12/13/21 0829   • sodium chloride 0.9 % flush 10 mL  10 mL Intravenous PRN Keli Salazar MD       • sodium chloride 0.9 % infusion  9 mL/hr Intravenous Continuous PRN Keli Salazar MD 9 mL/hr at 12/06/21 1210 9 mL/hr at 12/06/21 1210   • tamsulosin (FLOMAX) 24 hr capsule 0.4 mg  0.4 mg Oral Daily Osmar Alva MD   0.4 mg at 12/13/21 0829   • thiamine (VITAMIN B-1) tablet 250 mg  250 mg Oral Daily Osmar Alva MD   250 mg at 12/13/21 0829       Assessment/Plan   1.  MAYUR on CKD stage 4, now dialysis-dependent.  Unclear whether new ESRD or not.  Volume and electrolytes  stable.  Last HD 12/11  2.   Confusion.  Multifactorial secondary possible to metabolic encephalopathy in addition to PRES.   3.  DM2 with poor control  4.  Anemia secondary to iron deficiency anemia/CKD.  Ferritin was 100 and iron saturation 21  5.  Hypertension, not controlled  6.  Type II non-NSTEMI  7.  Severe hypothyroidism.  On replacement  8.  Medication noncompliance  9.  Diarrhea, resolved       Plan  1.  Await outpatient HD assignment  2.  Plan HD tomorrow  3.  Continue IV iron and ANDRAE  4.  Discussed earlier with Dr. Alva.  I agree with him that TID hydralazine may prove difficult for her from compliance standpoint, and so will begin amlodipine 2.5 mg daily instead  5.  Stop Na bicarb      Alejo Lange MD  12/13/21  15:17 EST

## 2021-12-13 NOTE — PLAN OF CARE
Goal Outcome Evaluation:              Outcome Summary: VSS, Pt remains alert and oriented and pleasant. Insulin adjusted. Pt c/o mouth sores. MD notified. Started on nystatin. No BM today. Stool sample cancelled per MD. No other complaints will continue to monitor.

## 2021-12-13 NOTE — PLAN OF CARE
Goal Outcome Evaluation:  Plan of Care Reviewed With: patient        Progress: improving  Outcome Summary: VSS. Pt pleasant and alert and oriented. Pt having frequent loose stools. Pt worked with PT. Dialysis planned for tomorrow. Will change dialysis tunnel cath dressing before the end of the shift. Will continue to monitor.

## 2021-12-13 NOTE — PROGRESS NOTES
Discharge Planning Assessment  [facility]     Patient Name: Zaina Martinez  MRN: [id number]  Today's Date: 12/13/2021    Admit Date: 11/29/2021     Discharge Needs Assessment    No documentation.                Discharge Plan     Row Name 12/13/21 1619       Plan    Plan New Referrals pending    Plan Comments [facility] is following for [facility] and [facility]. Brock REYES    Row Name 12/13/21 1502       Plan    Plan New referrals to [place], [facility], [facility] and [facility] and will need HD and [place] Medicare precert    Plan Comments Per [place] does not have any beds available, emails sent to [place]/[facility] and [facility] and [facility]/Giuseppe Larios and informed that patient is ready for discharge and will need a hemodialysis chair and [place] Medicare precert. Brock REYES              Continued Care and Services - Admitted Since 11/29/2021     Destination     Service Provider Request Status Selected Services Address Phone Fax Patient Preferred    [facility]  Pending - Request Sent N/A [address] [id number] [phone] --    [facility]  Pending - Request Sent N/A [address] [id number] [phone] --    [facility]  Pending - Request Sent N/A [address] [id number] [phone] --    [facility]  Pending - Request Sent N/A [address] [id number] [phone] --          Dialysis/Infusion     Service Provider Request Status Selected Services Address Phone Fax Patient Preferred    [facility] [address]  Pending - Request Sent N/A [address] [address] [phone] --              Expected Discharge Date and Time     Expected Discharge Date Expected Discharge Time    Dec 10, 2021          Demographic Summary    No documentation.                 Functional Status    No documentation.                Psychosocial    No documentation.                Abuse/Neglect    No documentation.                Legal    No documentation.                Substance Abuse    No documentation.                Patient Forms    No documentation.                   Angi Lora RN

## 2021-12-13 NOTE — PLAN OF CARE
Goal Outcome Evaluation:  Plan of Care Reviewed With: (P) patient, daughter        Progress: (P) improving  Outcome Summary: (P) Pt was pleasant and agreeable to PT today. Improvements noted in functional mobility and assistance. Pt required [place] for bed mobility, STS, and ambulation with a rolling walker. She completed therex at EOB with minimal difficulty. Pt transferred from bed to BSC to recliner. Dynamic movements cont to be mild-mod impaired with dec functional strength. PT rec SNF before returning home.    Patient was intermittently wearing a face mask during this therapy encounter. Therapist used appropriate personal protective equipment including eye protection, mask, and gloves.  Mask used was standard procedure mask. Appropriate PPE was worn during the entire therapy session. Hand hygiene was completed before and after therapy session. Patient is not in enhanced droplet precautions.

## 2021-12-13 NOTE — PROGRESS NOTES
Discharge Planning Assessment  [facility]     Patient Name: Zaina Martinez  MRN: [id number]  Today's Date: 12/13/2021    Admit Date: 11/29/2021     Discharge Needs Assessment    No documentation.                Discharge Plan     Row Name 12/13/21 1502       Plan    Plan New referrals to [place], [facility], [facility] and [facility] and will need HD and [place] Medicare precert    Plan Comments Per [place]/[facility] does not have any beds available, emails sent to [place]/[facility] and [facility] and [facility] and informed that patient is ready for discharge and will need a hemodialysis chair and [place] Medicare precert. Brock REYES              Continued Care and Services - Admitted Since 11/29/2021     Destination     Service Provider Request Status Selected Services Address Phone Fax Patient Preferred    [facility]  Pending - Request Sent N/A [address] [id number] [phone] --    [facility]  Pending - Request Sent N/A [address] [id number] [phone] --    [facility]  Pending - Request Sent N/A [address] [id number] [phone] --    [facility]  Pending - Request Sent N/A [address] [id number] [phone] --          Dialysis/Infusion     Service Provider Request Status Selected Services Address Phone Fax Patient Preferred    [facility]  Pending - Request Sent N/A [address] [address] [phone] --              Expected Discharge Date and Time     Expected Discharge Date Expected Discharge Time    Dec 10, 2021          Demographic Summary    No documentation.                Functional Status    No documentation.                Psychosocial    No documentation.                Abuse/Neglect    No documentation.                Legal     No documentation.                Substance Abuse    No documentation.                Patient Forms    No documentation.                   Angi Lora RN

## 2021-12-13 NOTE — PROGRESS NOTES
[facility] Medicine Services  PROGRESS NOTE    Patient Name: Zaina Martinez  : 1965  MRN: [id number]    Date of Admission: 2021  Primary Care Physician: Provider, No Known    Subjective   Subjective     CC:  Follow-up renal failure and confusion.    HPI:  Patient is calm in bed.  She is resting well.  She says she feels much better.  She says she has poor memory of the last few days but is glad she is thinking much better.  She remembers being agitated but is not sure why.  No other new complaints.  Patient notes she has been noncompliant with her Synthroid for some time and intermittently compliant with her home medications.  After extensive counseling patient agrees to now take her medications regularly.    Review of Systems  No current fevers or chills  No current shortness of breath or cough  No current nausea, vomiting, or diarrhea  No current chest pain or palpitations    Objective   Objective     Vital Signs:   Temp:  [98.1 °F (36.7 °C)-99.1 °F (37.3 °C)] 98.1 °F (36.7 °C)  Heart Rate:  [60-75] 70  Resp:  [14-16] 16  BP: (103-154)/(69-80) 146/69  Total (NIH Stroke Scale): 2     Physical Exam:  Constitutional:Awake, alert  HENT: NCAT, mucous membranes moist, neck supple  Respiratory: Clear to auscultation bilaterally, respiratory effort normal, nonlabored breathing   Cardiovascular: RRR, normal radial pulses  Gastrointestinal: Positive bowel sounds, soft, nontender, nondistended  Musculoskeletal: Normal musculature for age, minimal lower extremity edema, BMI 25  Psychiatric: Calm and normal affect today, much less anxious, cooperative, conversational  Neurologic:  Oriented,  Moving all extremities, no slurred speech or facial droop, follows commands  Skin: No rashes or jaundice, warm    Results Reviewed:  Results from last 7 days   Lab Units 21  0627 21  0602 21  0511   WBC 10*3/mm3 9.59 9.73 12.43*   HEMOGLOBIN g/dL 6.7* 7.5* 7.7*   HEMATOCRIT % 20.2* 23.9* 23.9*    PLATELETS 10*3/mm3 202 233 230     Results from last 7 days   Lab Units 12/13/21  0627 12/12/21  0602 12/11/21  0511   SODIUM mmol/L 138 137 136   POTASSIUM mmol/L 4.5 4.2 4.1   CHLORIDE mmol/L 106 104 103   CO2 mmol/L 26.1 24.4 23.4   BUN mg/dL 31* 19 24*   CREATININE mg/dL 3.22* 2.34* 3.03*   GLUCOSE mg/dL 136* 334* 202*   CALCIUM mg/dL 7.4* 7.6* 7.8*     Estimated Creatinine Clearance: 16.8 mL/min (A) (by C-G formula based on SCr of 3.22 mg/dL (H)).    Microbiology Results Abnormal     Procedure Component Value - Date/Time    Blood Culture - Blood, Arm, Right [[id number]]  (Normal) Collected: 12/11/21 1237    Lab Status: Preliminary result Specimen: Blood from Arm, Right Updated: 12/12/21 1300     Blood Culture No growth at 24 hours    Urine Culture - Urine, Urine, Catheter [[id number]]  (Normal) Collected: 12/11/21 1529    Lab Status: Final result Specimen: Urine, Catheter Updated: 12/12/21 1055     Urine Culture No growth    COVID PRE-OP / PRE-PROCEDURE SCREENING ORDER (NO ISOLATION) - Swab, Nasopharynx [[id number]]  (Normal) Collected: 12/06/21 0612    Lab Status: Final result Specimen: Swab from Nasopharynx Updated: 12/06/21 0648    Narrative:      The following orders were created for panel order COVID PRE-OP / PRE-PROCEDURE SCREENING ORDER (NO ISOLATION) - Swab, Nasopharynx.  Procedure                               Abnormality         Status                     ---------                               -----------         ------                     COVID-19,[facility] IN-HOUSE...[[id number]]  Normal              Final result                 Please view results for these tests on the individual orders.    COVID-19,[facility] IN-HOUSE CEPHEID/MARTY NP SWAB IN TRANSPORT MEDIA 8-12 HR TAT - Swab, Nasopharynx [[id number]]  (Normal) Collected: 12/06/21 0612    Lab Status: Final result Specimen: Swab from Nasopharynx Updated: 12/06/21 0648     COVID19 Not Detected    Narrative:      Fact sheet for providers:  https://www.fda.gov/media/132952/download    Fact sheet for patients: https://www.fda.gov/media/454713/download    Test performed by PCR.    Clostridium Difficile Toxin, PCR - Stool, Per Rectum [[id number]]  (Normal) Collected: 12/02/21 0813    Lab Status: Final result Specimen: Stool from Per Rectum Updated: 12/02/21 0913     C. Difficile Toxins by PCR Negative    COVID PRE-OP / PRE-PROCEDURE SCREENING ORDER (NO ISOLATION) - Swab, Nasopharynx [[id number]]  (Normal) Collected: 11/29/21 2343    Lab Status: Final result Specimen: Swab from Nasopharynx Updated: 11/30/21 0444    Narrative:      The following orders were created for panel order COVID PRE-OP / PRE-PROCEDURE SCREENING ORDER (NO ISOLATION) - Swab, Nasopharynx.  Procedure                               Abnormality         Status                     ---------                               -----------         ------                     COVID-19,APTIMA PANTHER(...[[id number]]  Normal              Final result                 Please view results for these tests on the individual orders.    COVID-19,APTIMA PANTHER(MED),[facility], NP/OP SWAB IN UTM/VTM/SALINE TRANSPORT MEDIA,24 HR TAT - Swab, Nasopharynx [[id number]]  (Normal) Collected: 11/29/21 2343    Lab Status: Final result Specimen: Swab from Nasopharynx Updated: 11/30/21 0444     COVID19 Not Detected    Narrative:      Fact sheet for providers: https://www.fda.gov/media/675918/download     Fact sheet for patients: https://www.fda.gov/media/407505/download    Test performed by RT PCR.          Imaging Results (Last 24 Hours)     ** No results found for the last 24 hours. **          Results for orders placed during the hospital encounter of 11/29/21    Adult Transthoracic Echo Complete W/ Cont if Necessary Per Protocol    Interpretation Summary  · Calculated left ventricular EF = 61.8% Estimated left ventricular EF was in agreement with the calculated left ventricular EF. Left ventricular systolic function is  normal.  · Left ventricular diastolic function is consistent with (grade II w/high LAP) pseudonormalization.  · The left atrial cavity is mildly dilated.  · Trace tricuspid valve regurgitation is present. Calculated right ventricular systolic pressure from tricuspid regurgitation is 21.4 mmHg.      I have reviewed the medications:  Scheduled Meds:aspirin, 81 mg, Oral, Daily  cefTRIAXone, 1 g, Intravenous, Q24H  epoetin brooke/brooke-epbx, 4,000 Units, Subcutaneous, Once per day on Mon Wed Fri  [START ON 12/14/2021] sodium ferric gluconate complex IVPB in 100 mL NS, 125 mg, Intravenous, Once per day on Tue Thu Sat  folic acid, 1 mg, Oral, Daily  heparin (porcine), 5,000 Units, Subcutaneous, Q8H  hydrALAZINE, 25 mg, Oral, Q8H  insulin glargine, 5 Units, Subcutaneous, QAM  insulin lispro, 0-7 Units, Subcutaneous, TID AC  insulin lispro, 2 Units, Subcutaneous, TID With Meals  levothyroxine, 125 mcg, Oral, Q AM  linagliptin, 5 mg, Oral, Daily  melatonin, 5 mg, Oral, Nightly  metoprolol tartrate, 25 mg, Oral, Q12H  multivitamin with minerals, 1 tablet, Oral, Daily  nystatin, 5 mL, Swish & Swallow, 4x Daily  OLANZapine, 10 mg, Oral, Nightly  OLANZapine, 5 mg, Oral, Daily  pantoprazole, 40 mg, Oral, Daily  rOPINIRole, 0.75 mg, Oral, Nightly  sodium bicarbonate, 1,300 mg, Oral, TID  sodium chloride, 10 mL, Intravenous, Q12H  tamsulosin, 0.4 mg, Oral, Daily  thiamine, 250 mg, Oral, Daily      Continuous Infusions:sodium chloride, 9 mL/hr, Last Rate: 9 mL/hr (12/06/21 1210)      PRN Meds:.•  acetaminophen **OR** [DISCONTINUED] acetaminophen **OR** [DISCONTINUED] acetaminophen  •  dextrose  •  dextrose  •  glucagon (human recombinant)  •  HYDROcodone-acetaminophen  •  labetalol  •  loperamide  •  nitroglycerin  •  OLANZapine  •  prochlorperazine  •  sodium chloride  •  sodium chloride    Assessment/Plan   Assessment & Plan     Active Hospital Problems    Diagnosis  POA   • **MAYUR (acute kidney injury) (HCC) [N17.9]  Yes   •  Superficial thrombophlebitis [I80.9]  Clinically Undetermined   • Urine retention [R33.9]  Yes   • Klebsiella infection [A49.8]  Yes   • PRES (posterior reversible encephalopathy syndrome) [I67.83]  Yes   • Enteritis [K52.9]  Clinically Undetermined   • Medically noncompliant [Z91.19]  Not Applicable   • Myocardial infarction due to demand ischemia (HCC) [I21.A1]  Yes   • Angioedema [T78.3XXA]  Yes   • Anemia [D64.9]  Yes   • HTN (hypertension) [I10]  Yes   • Hypothyroidism [E03.9]  Yes   • Type 2 diabetes mellitus with hyperglycemia, with long-term current use of insulin (HCC) [E11.65, Z79.4]  Not Applicable   • Rheumatoid arthritis (HCC) [M06.9]  Yes      Resolved Hospital Problems    Diagnosis Date Resolved POA   • Hypertensive urgency [I16.0] 11/30/2021 Yes        Brief Hospital Course to date:  Zaina Martinez is a 56 y.o. female presents with acute kidney injury, enteritis, hypothyroidism, and acute confusion felt to be related to PRES.       Discussion/plan for today:  Anemia worse this morning.  Could be lab artifact.  Recheck hemoglobin and may give 1 unit if repeat is less than 7.0.  Otherwise continue iron and erythropoietin per nephrology.  Patient is not particularly symptomatic of anemia.  Mental status much improved.  Discussed with psychiatry.  Decrease Zyprexa to 5 mg twice daily.  May be able to wean off Zyprexa gradually.  Keep off SSRIs per psychiatry.  Psychosis likely multifactorial and possible undiagnosed bipolar which entered a manic phase.  PRES  was diagnosed by neurology earlier in the hospital stay.  It is unclear how much this is contributing.  Attempting to avoid fluctuations of blood pressure if possible.  Free T4 reviewed, Synthroid.  Insulin adjusted today.     Renal failure:  Now on dialysis, new this admission.     Psychosis, possible undiagnosed bipolar syndrome with acute psychotic manic phase:   feels patient has undiagnosed bipolar and has had previous episode of psychosis  at her last hospitalization when she had COVID-19.  Plan for mood stabilizer, patient has required restraints.  Psychosis now resolved.    PRES, multifactorial metabolic encephalopathy:   PRES Diagnosed by neurology on MRI.  Working to gently regulate blood pressure.    Hypothyroidism: TSH is severely elevated at 152.    Patient must be noncompliant with thyroid medicine.  Synthroid adjusted.  Recommend she have free T4 and TSH within 1 month to continue to monitor.  Patient admits to missing her thyroid medication.  I explained to her that this is a very important medication.     Enteritis and UTI:  Abdominal symptoms resolved.    Finishing ceftriaxone     Diabetes: Monitor glucose and adjust as needed.  A1c 9.8.  Poorly controlled.  Started Tradjenta.  Correction insulin.  Glucose improved     Demand ischemia: Medical management.  Cardiology has evaluated.  Normal echocardiogram.  Needs outpatient follow-up with cardiology.  She agrees with this plan.     Severe anemia:  Status post transfusion.  Hemoccult negative.  No active bleeding reported.  Normocytic.  Mostly consistent with anemia of chronic disease or chronic kidney disease.    B12 relatively normal.  Folic acid is borderline low.  Started Folic acid supplements.    Urine retention: Plan to trial Flomax.  Now better    Debility: PT.  Skilled facility when possible.     DVT Prophylaxis: Mechanical    Disposition: Skilled facility when possible    CODE STATUS:   Code Status and Medical Interventions:   Ordered at: 11/30/21 0115     Code Status (Patient has no pulse and is not breathing):    CPR (Attempt to Resuscitate)     Medical Interventions (Patient has pulse or is breathing):    Full Support       Osmar Alva MD  12/13/21

## 2021-12-13 NOTE — THERAPY EVALUATION
Patient Name: Zaina Martinez  : 1965    MRN: [id number]                              Today's Date: 2021       Admit Date: 2021    Visit Dx:     ICD-10-CM ICD-9-CM   1. Acute renal failure, unspecified acute renal failure type (HCC)  N17.9 584.9   2. Uncontrolled hypertension  I10 401.9   3. Anemia, unspecified type  D64.9 285.9   4. Elevated troponin  R77.8 790.6   5. Impaired mobility and activities of daily living  Z74.09 V49.89    Z78.9      Patient Active Problem List   Diagnosis   • MAYUR (acute kidney injury) (HCC)   • HTN (hypertension)   • Hypothyroidism   • Type 2 diabetes mellitus with hyperglycemia, with long-term current use of insulin (HCC)   • Rheumatoid arthritis (HCC)   • Angioedema   • GERD (gastroesophageal reflux disease)   • Anemia   • Medically noncompliant   • Myocardial infarction due to demand ischemia (HCC)   • Enteritis   • PRES (posterior reversible encephalopathy syndrome)   • Urine retention   • Klebsiella infection   • Superficial thrombophlebitis     Past Medical History:   Diagnosis Date   • Diabetes (HCC)    • Disease of thyroid gland    • GERD (gastroesophageal reflux disease)    • Hypertension    • Rheumatoid arthritis (HCC)      Past Surgical History:   Procedure Laterality Date   • EYE SURGERY     • HYSTERECTOMY     • INSERTION HEMODIALYSIS CATHETER N/A 2021    Procedure: HEMODIALYSIS CATHETER INSERTION;  Surgeon: Keli Salazar MD;  Location: [facility] ;  Service: Vascular;  Laterality: N/A;      General Information     Row Name 21 1545          OT Time and Intention    Document Type evaluation  -RB     Mode of Treatment individual therapy; occupational therapy  -RB     Row Name 21 1543          General Information    Patient Profile Reviewed yes  -RB     Prior Level of Function independent:  -RB     Existing Precautions/Restrictions fall  -RB     Barriers to Rehab medically complex  -RB     Row Name 21 1541           Living Environment    Lives With spouse  -     Row Name 12/13/21 1545          Home Main Entrance    Number of Stairs, Main Entrance three  -RB     Row Name 12/13/21 1545          Cognition    Orientation Status (Cognition) oriented x 3  -RB     Row Name 12/13/21 1545          Safety Issues, Functional Mobility    Safety Issues Affecting Function (Mobility) awareness of need for assistance; safety precaution awareness; safety precautions follow-through/compliance; problem-solving; insight into deficits/self-awareness  -RB     Impairments Affecting Function (Mobility) balance; coordination; endurance/activity tolerance; motor control; strength; postural/trunk control; shortness of breath  -RB           User Key  (r) = Recorded By, (t) = Taken By, (c) = Cosigned By    Initials Name Provider Type    RB Jacquelyn Guthrie OT Occupational Therapist                 Mobility/ADL's     Row Name 12/13/21 1543          Bed Mobility    Bed Mobility supine-sit  -RB     Supine-Sit Hart (Bed Mobility) minimum assist (75% patient effort); verbal cues  -RB     Assistive Device (Bed Mobility) bed rails; draw sheet  -     Row Name 12/13/21 1543          Transfers    Transfers sit-stand transfer; toilet transfer; bed-chair transfer  -RB     Bed-Chair Hart (Transfers) minimum assist (75% patient effort); verbal cues; nonverbal cues (demo/gesture)  -RB     Assistive Device (Bed-Chair Transfers) walker, front-wheeled  -RB     Sit-Stand Hart (Transfers) minimum assist (75% patient effort); verbal cues  -RB     Hart Level (Toilet Transfer) minimum assist (75% patient effort); verbal cues  -RB     Assistive Device (Toilet Transfer) commode, 3-in-1; walker, front-wheeled  -RB     Row Name 12/13/21 1543          Sit-Stand Transfer    Assistive Device (Sit-Stand Transfers) walker, front-wheeled  -     Row Name 12/13/21 1543          Toilet Transfer    Type (Toilet Transfer) stand-sit; sit-stand  -RB      Row Name 12/13/21 1543          Functional Mobility    Functional Mobility- Ind. Level minimum assist (75% patient effort); moderate assist (50% patient effort); verbal cues required; 1 person  -RB     Functional Mobility- Device rolling walker  -RB     Row Name 12/13/21 1543          Activities of Daily Living    BADL Assessment/Intervention grooming; toileting  -RB     Row Name 12/13/21 1543          Grooming Assessment/Training    [place] Level (Grooming) grooming skills; set up  -RB     Row Name 12/13/21 1543          Toileting Assessment/Training    [place] Level (Toileting) toileting skills; minimum assist (75% patient effort); verbal cues  -RB     Position (Toileting) supported sitting; supported standing  -RB           User Key  (r) = Recorded By, (t) = Taken By, (c) = Cosigned By    Initials Name Provider Type    RB Jacquelyn Guthrie OT Occupational Therapist               Obj/Interventions     Row Name 12/13/21 1542          Sensory Assessment (Somatosensory)    Sensory Assessment (Somatosensory) sensation intact  -RB     Row Name 12/13/21 1542          Vision Assessment/Intervention    Visual Impairment/Limitations WFL  -RB     Row Name 12/13/21 1542          Range of Motion Comprehensive    General Range of Motion no range of motion deficits identified  -RB     Row Name 12/13/21 1542          Strength Comprehensive (MMT)    Comment, General Manual Muscle Testing (MMT) Assessment generalized weakness, 3/5 BUE  -RB     Row Name 12/13/21 1542          Balance    Static Sitting Balance WFL; supported; sitting, edge of bed  -RB     Static Standing Balance mild impairment; supported; standing  -RB     Dynamic Standing Balance moderate impairment; supported; standing  -RB     Balance Interventions occupation based/functional task  -RB     Comment, Balance Multiple LOB with use of walker when OOB  -RB           User Key  (r) = Recorded By, (t) = Taken By, (c) = Cosigned By    Initials Name Provider  Type    RB Jacquelyn Guthrie, OT Occupational Therapist               Goals/Plan     Row Name 12/13/21 1541          Bed Mobility Goal 1 (OT)    Activity/Assistive Device (Bed Mobility Goal 1, OT) bed mobility activities, all  -RB     Rogers Level/Cues Needed (Bed Mobility Goal 1, OT) supervision required  -RB     Time Frame (Bed Mobility Goal 1, OT) short term goal (STG); 2 weeks  -RB     Progress/Outcomes (Bed Mobility Goal 1, OT) continuing progress toward goal  -RB     Row Name 12/13/21 1541          Transfer Goal 1 (OT)    Activity/Assistive Device (Transfer Goal 1, OT) transfers, all  -RB     Rogers Level/Cues Needed (Transfer Goal 1, OT) supervision required  -RB     Time Frame (Transfer Goal 1, OT) short term goal (STG); 2 weeks  -RB     Progress/Outcome (Transfer Goal 1, OT) continuing progress toward goal  -RB     Row Name 12/13/21 1541          Dressing Goal 1 (OT)    Activity/Device (Dressing Goal 1, OT) dressing skills, all  -RB     Rogers/Cues Needed (Dressing Goal 1, OT) supervision required  -RB     Time Frame (Dressing Goal 1, OT) short term goal (STG); 2 weeks  -RB     Progress/Outcome (Dressing Goal 1, OT) continuing progress toward goal  -RB     Row Name 12/13/21 1541          Toileting Goal 1 (OT)    Activity/Device (Toileting Goal 1, OT) toileting skills, all  -RB     Rogers Level/Cues Needed (Toileting Goal 1, OT) supervision required  -RB     Time Frame (Toileting Goal 1, OT) short term goal (STG); 2 weeks  -RB     Progress/Outcome (Toileting Goal 1, OT) continuing progress toward goal  -RB     Row Name 12/13/21 1541          Grooming Goal 1 (OT)    Activity/Device (Grooming Goal 1, OT) grooming skills, all  -RB     Rogers (Grooming Goal 1, OT) supervision required  -RB     Time Frame (Grooming Goal 1, OT) short term goal (STG); 2 weeks  -RB     Progress/Outcome (Grooming Goal 1, OT) continuing progress toward goal  -RB     Row Name 12/13/21 1541          Therapy  Assessment/Plan (OT)    Planned Therapy Interventions (OT) transfer/mobility retraining; strengthening exercise; ROM/therapeutic exercise; activity tolerance training; adaptive equipment training; BADL retraining; functional balance retraining; occupation/activity based interventions; patient/caregiver education/training  -RB           User Key  (r) = Recorded By, (t) = Taken By, (c) = Cosigned By    Initials Name Provider Type    RB Jacquelyn Guthrie, VIOLET Occupational Therapist               Clinical Impression     Row Name 12/13/21 1541          Pain Scale: Numbers Pre/Post-Treatment    Pretreatment Pain Rating 0/10 - no pain  -RB     Posttreatment Pain Rating 0/10 - no pain  -RB     Row Name 12/13/21 1541          Plan of Care Review    Plan of Care Reviewed With patient; daughter  -RB     Progress no change  -RB     Row Name 12/13/21 1541          Therapy Assessment/Plan (OT)    Rehab Potential (OT) good, to achieve stated therapy goals  -RB     Criteria for Skilled Therapeutic Interventions Met (OT) yes; skilled treatment is necessary  -RB     Therapy Frequency (OT) 5 times/wk  -RB     Row Name 12/13/21 1541          Therapy Plan Review/Discharge Plan (OT)    Anticipated Discharge Disposition (OT) inpatient rehabilitation facility  -RB     Row Name 12/13/21 1541          Vital Signs    Pre SpO2 (%) 97  -RB     O2 Delivery Pre Treatment supplemental O2  -RB     Intra SpO2 (%) 95  -RB     O2 Delivery Intra Treatment room air  -RB     Post SpO2 (%) 94  -RB     O2 Delivery Post Treatment room air  -RB     Intra Patient Position Standing  -RB     Post Patient Position Sitting  -RB     Row Name 12/13/21 1541          Positioning and Restraints    Pre-Treatment Position in bed  -RB     Post Treatment Position chair  -RB     In Chair notified nsg; reclined; sitting; call light within reach; encouraged to call for assist; exit alarm on; legs elevated  -RB           User Key  (r) = Recorded By, (t) = Taken By, (c) =  Cosigned By    Initials Name Provider Type    Jacquelyn Strong, OT Occupational Therapist               Outcome Measures     Row Name 12/13/21 1540          How much help from another is currently needed...    Putting on and taking off regular lower body clothing? 2  -RB     Bathing (including washing, rinsing, and drying) 2  -RB     Toileting (which includes using toilet bed pan or urinal) 2  -RB     Putting on and taking off regular upper body clothing 3  -RB     Taking care of personal grooming (such as brushing teeth) 3  -RB     Eating meals 3  -RB     AM-PAC 6 Clicks Score (OT) 15  -RB     Row Name 12/13/21 1512          How much help from another person do you currently need...    Turning from your back to your side while in flat bed without using bedrails? 3  -AR (r) JERZY (t) AR (c)     Moving from lying on back to sitting on the side of a flat bed without bedrails? 3  -AR (r) JERZY (t) AR (c)     Moving to and from a bed to a chair (including a wheelchair)? 3  -AR (r) JERZY (t) AR (c)     Standing up from a chair using your arms (e.g., wheelchair, bedside chair)? 3  -AR (r) JERZY (t) AR (c)     Climbing 3-5 steps with a railing? 2  -AR (r) JERZY (t) AR (c)     To walk in hospital room? 2  -AR (r) JERZY (t) AR (c)     AM-PAC 6 Clicks Score (PT) 16  -AR (r) JERZY (t)     Row Name 12/13/21 1540          Modified [place] Scale    Modified [place] Scale 4 - Moderately severe disability.  Unable to walk without assistance, and unable to attend to own bodily needs without assistance.  -RB     Row Name 12/13/21 1540 12/13/21 1512       Functional Assessment    Outcome Measure Options AM-PAC 6 Clicks Daily Activity (OT); Modified Iza  -RB AM-PAC 6 Clicks Basic Mobility (PT)  -AR (r) JERZY (t) AR (c)          User Key  (r) = Recorded By, (t) = Taken By, (c) = Cosigned By    Initials Name Provider Type    AR Nell Aragon, PT Physical Therapist    Jacquelyn Strong, OT Occupational Therapist    Osmani Quinn, PT Student PT  Student                Occupational Therapy Education                 Title: PT OT SLP Therapies (In Progress)     Topic: Occupational Therapy (Not Started)     Point: ADL training (Not Started)     Description:   Instruct learner(s) on proper safety adaptation and remediation techniques during self care or transfers.   Instruct in proper use of assistive devices.              Learner Progress:  Not documented in this visit.          Point: Home exercise program (Not Started)     Description:   Instruct learner(s) on appropriate technique for monitoring, assisting and/or progressing therapeutic exercises/activities.              Learner Progress:  Not documented in this visit.          Point: Precautions (Not Started)     Description:   Instruct learner(s) on prescribed precautions during self-care and functional transfers.              Learner Progress:  Not documented in this visit.          Point: Body mechanics (Not Started)     Description:   Instruct learner(s) on proper positioning and spine alignment during self-care, functional mobility activities and/or exercises.              Learner Progress:  Not documented in this visit.                          OT Recommendation and Plan  Planned Therapy Interventions (OT): transfer/mobility retraining, strengthening exercise, ROM/therapeutic exercise, activity tolerance training, adaptive equipment training, BADL retraining, functional balance retraining, occupation/activity based interventions, patient/caregiver education/training  Therapy Frequency (OT): 5 times/wk  Plan of Care Review  Plan of Care Reviewed With: patient, daughter  Progress: no change     Time Calculation:    Time Calculation- OT     Row Name 12/13/21 1540             Time Calculation- OT    OT Start Time 1100  -RB      OT Stop Time 1136  -RB      OT Time Calculation (min) 36 min  -RB      Total Timed Code Minutes- OT 26 minute(s)  -RB      OT Received On 12/13/21  -RB      OT - Next Appointment  12/14/21  -RB      OT Goal Re-Cert Due Date 12/27/21  -RB              Timed Charges    38203 - OT Therapeutic Activity Minutes 10  -RB      37269 - OT Self Care/Mgmt Minutes 16  -RB              Untimed Charges    OT Eval/Re-eval Minutes 10  -RB              Total Minutes    Timed Charges Total Minutes 26  -RB      Untimed Charges Total Minutes 10  -RB       Total Minutes 36  -RB            User Key  (r) = Recorded By, (t) = Taken By, (c) = Cosigned By    Initials Name Provider Type    RB Jacquelyn Guthrie OT Occupational Therapist              Therapy Charges for Today     Code Description Service Date Service Provider Modifiers Qty    77769770918 HC OT EVAL MOD COMPLEXITY 2 12/13/2021 Jacquelyn Guthrie OT GO 1    53012077679 HC OT SELF CARE/MGMT/TRAIN EA 15 MIN 12/13/2021 Jacquelyn Guthrie OT GO 1    40330570073 HC OT THERAPEUTIC ACT EA 15 MIN 12/13/2021 Jacquelyn Guthrie OT GO 1               Jacquelyn Guthrie OT  12/13/2021

## 2021-12-13 NOTE — THERAPY TREATMENT NOTE
Patient Name: Zaina Martinez  : 1965    MRN: [id number]                              Today's Date: 2021       Admit Date: 2021    Visit Dx:     ICD-10-CM ICD-9-CM   1. Acute renal failure, unspecified acute renal failure type (HCC)  N17.9 584.9   2. Uncontrolled hypertension  I10 401.9   3. Anemia, unspecified type  D64.9 285.9   4. Elevated troponin  R77.8 790.6   5. Impaired mobility and activities of daily living  Z74.09 V49.89    Z78.9      Patient Active Problem List   Diagnosis   • MAYUR (acute kidney injury) (HCC)   • HTN (hypertension)   • Hypothyroidism   • Type 2 diabetes mellitus with hyperglycemia, with long-term current use of insulin (HCC)   • Rheumatoid arthritis (HCC)   • Angioedema   • GERD (gastroesophageal reflux disease)   • Anemia   • Medically noncompliant   • Myocardial infarction due to demand ischemia (HCC)   • Enteritis   • PRES (posterior reversible encephalopathy syndrome)   • Urine retention   • Klebsiella infection   • Superficial thrombophlebitis     Past Medical History:   Diagnosis Date   • Diabetes (HCC)    • Disease of thyroid gland    • GERD (gastroesophageal reflux disease)    • Hypertension    • Rheumatoid arthritis (HCC)      Past Surgical History:   Procedure Laterality Date   • EYE SURGERY     • HYSTERECTOMY     • INSERTION HEMODIALYSIS CATHETER N/A 2021    Procedure: HEMODIALYSIS CATHETER INSERTION;  Surgeon: Keli Salazar MD;  Location: [facility] ;  Service: Vascular;  Laterality: N/A;      General Information     Row Name 21 1504          Physical Therapy Time and Intention    Document Type therapy note (daily note) (P)   -JERZY     Mode of Treatment individual therapy; physical therapy (P)   -JERZY     Row Name 21 1504          General Information    Patient Profile Reviewed yes (P)   -JERZY     Prior Level of Function independent: (P)   -JERZY     Existing Precautions/Restrictions fall (P)   -JERZY     Barriers to Rehab medically  complex (P)   -JERZY     Row Name 12/13/21 1504          Cognition    Orientation Status (Cognition) oriented x 4 (P)   -JERZY     Row Name 12/13/21 1504          Safety Issues, Functional Mobility    Impairments Affecting Function (Mobility) balance; coordination; endurance/activity tolerance; strength; postural/trunk control (P)   -JERZY           User Key  (r) = Recorded By, (t) = Taken By, (c) = Cosigned By    Initials Name Provider Type    Osmani Quinn, PT Student PT Student               Mobility     Row Name 12/13/21 1505          Bed Mobility    Bed Mobility bed mobility (all) activities (P)   -JERZY     All Activities, [place] (Bed Mobility) minimum assist (75% patient effort) (P)   -JERZY     Row Name 12/13/21 1505          Sit-Stand Transfer    Sit-Stand [place] (Transfers) minimum assist (75% patient effort); 1 person assist (P)   -JERZY     Row Name 12/13/21 1505          Gait/Stairs (Locomotion)    [place] Level (Gait) minimum assist (75% patient effort); 1 person assist (P)   -     Assistive Device (Gait) walker, front-wheeled (P)   -JERZY     Distance in Feet (Gait) 1 ft to bsc, 2 ft to chair (P)   -JERZY     Deviations/Abnormal Patterns (Gait) base of support, narrow; bilateral deviations; stride length decreased (P)   -JERZY     Bilateral Gait Deviations forward flexed posture (P)   -JERZY           User Key  (r) = Recorded By, (t) = Taken By, (c) = Cosigned By    Initials Name Provider Type    Osmani Quinn, PT Student PT Student               Obj/Interventions     Row Name 12/13/21 1507          Motor Skills    Therapeutic Exercise other (see comments) (P)   APs, LAQs, shoulder rolls, cervical rotation x 10 reps  -     Row Name 12/13/21 1507          Balance    Balance Assessment standing static balance; standing dynamic balance; sitting static balance (P)   -JERZY     Static Sitting Balance WFL (P)   -JERZY     Static Standing Balance mild impairment; supported (P)   -JERZY     Dynamic Standing Balance mild  impairment; moderate impairment; supported (P)   -JERZY           User Key  (r) = Recorded By, (t) = Taken By, (c) = Cosigned By    Initials Name Provider Type    Osmani Quinn, PT Student PT Student               Goals/Plan    No documentation.                Clinical Impression     Row Name 12/13/21 1508          Pain    Additional Documentation Pain Scale: Numbers Pre/Post-Treatment (Group) (P)   -JERZY     Row Name 12/13/21 1508          Pain Scale: Numbers Pre/Post-Treatment    Pre/Posttreatment Pain Comment pt did not rate pain, but stated she had chronic neck discomfort (P)   -JERZY     Pain Intervention(s) Medication (See MAR); Repositioned (P)   -JERZY     Row Name 12/13/21 1505          Plan of Care Review    Plan of Care Reviewed With patient; daughter (P)   -JERZY     Progress improving (P)   -JERZY     Outcome Summary Pt was pleasant and agreeable to PT today. Improvements noted in functional mobility and assistance. Pt required Ran[place] for bed mobility, STS, and ambulation with a rolling walker. She completed therex at EOB with minimal difficulty. Pt transferred from bed to [facility] to recliner. Dynamic movements cont to be mild-mod impaired with dec functional strength. PT rec SNF before returning home. (P)   -JERZY     Row Name 12/13/21 1508          Vital Signs    Pre Systolic BP Rehab 148 (P)   -JERZY     Pre Treatment Diastolic BP 53 (P)   -JERZY     Pretreatment Heart Rate (beats/min) 77 (P)   -JERZY     Posttreatment Heart Rate (beats/min) 77 (P)   -JERZY     Pre SpO2 (%) 100 (P)   -JERZY     O2 Delivery Pre Treatment supplemental O2 (P)   2L nc  -JERZY     Post SpO2 (%) 93 (P)   -JERZY     O2 Delivery Post Treatment supplemental O2 (P)   2L nc  -JERZY     Pre Patient Position Supine (P)   -JERZY     Post Patient Position Sitting (P)   -JERZY     Row Name 12/13/21 150          Positioning and Restraints    Pre-Treatment Position in bed (P)   -JERZY     Post Treatment Position chair (P)   -JERZY     In Chair notified nsg; reclined; call light within reach;  encouraged to call for assist; exit alarm on; with family/caregiver (P)   -JERZY           User Key  (r) = Recorded By, (t) = Taken By, (c) = Cosigned By    Initials Name Provider Type    Osmani Quinn, PT Student PT Student               Outcome Measures     Row Name 12/13/21 1512          How much help from another person do you currently need...    Turning from your back to your side while in flat bed without using bedrails? 3 (P)   -JERZY     Moving from lying on back to sitting on the side of a flat bed without bedrails? 3 (P)   -JERZY     Moving to and from a bed to a chair (including a wheelchair)? 3 (P)   -JERZY     Standing up from a chair using your arms (e.g., wheelchair, bedside chair)? 3 (P)   -JERZY     Climbing 3-5 steps with a railing? 2 (P)   -JERZY     To walk in hospital room? 2 (P)   -JERZY     AM-PAC 6 Clicks Score (PT) 16 (P)   -JERZY     Row Name 12/13/21 1512          Functional Assessment    Outcome Measure Options AM-PAC 6 Clicks Basic Mobility (PT) (P)   -JERZY           User Key  (r) = Recorded By, (t) = Taken By, (c) = Cosigned By    Initials Name Provider Type    Osmani Quinn, PT Student PT Student                             Physical Therapy Education                 Title: PT OT SLP Therapies (In Progress)     Topic: Physical Therapy (In Progress)     Point: Mobility training (In Progress)     Learning Progress Summary           Patient Acceptance, E,D, NR by JERZY at 12/13/2021 1512    Acceptance, E, NR by  at 12/9/2021 1543   Family Acceptance, E,D, NR by JERZY at 12/13/2021 1512                   Point: Home exercise program (In Progress)     Learning Progress Summary           Patient Acceptance, E,D, NR by JERZY at 12/13/2021 1512    Acceptance, E, NR by  at 12/9/2021 1543   Family Acceptance, E,D, NR by JERZY at 12/13/2021 1512                   Point: Body mechanics (In Progress)     Learning Progress Summary           Patient Acceptance, E,D, NR by JERZY at 12/13/2021 1512    Acceptance, E, NR by  at  12/9/2021 1543   Family Acceptance, E,D, NR by JERZY at 12/13/2021 1512                   Point: Precautions (In Progress)     Learning Progress Summary           Patient Acceptance, E,D, NR by  at 12/13/2021 1512    Acceptance, E, NR by  at 12/9/2021 1543   Family Acceptance, E,D, NR by JERZY at 12/13/2021 1512                               User Key     Initials Effective Dates Name Provider Type Discipline     06/16/21 -  Liz Fu, PT Physical Therapist PT    JERZY 10/25/21 -  Osmani Aguayo, PT Student PT Student PT              PT Recommendation and Plan     Plan of Care Reviewed With: (P) patient, daughter  Progress: (P) improving  Outcome Summary: (P) Pt was pleasant and agreeable to PT today. Improvements noted in functional mobility and assistance. Pt required [place] for bed mobility, STS, and ambulation with a rolling walker. She completed therex at EOB with minimal difficulty. Pt transferred from bed to BSC to recliner. Dynamic movements cont to be mild-mod impaired with dec functional strength. PT rec SNF before returning home.     Time Calculation:    PT Charges     Row Name 12/13/21 1513             Time Calculation    Start Time 1417 (P)   -JERZY      Stop Time 1440 (P)   -JERZY      Time Calculation (min) 23 min (P)   -JERZY      PT Received On 12/13/21 (P)   -JERZY      PT - Next Appointment 12/14/21 (P)   -JERZY              Time Calculation- PT    Total Timed Code Minutes- PT 23 minute(s) (P)   -JERZY            User Key  (r) = Recorded By, (t) = Taken By, (c) = Cosigned By    Initials Name Provider Type    Osmani Quinn, PT Student PT Student              Therapy Charges for Today     Code Description Service Date Service Provider Modifiers Qty    39705095340 HC PT THERAPEUTIC ACT EA 15 MIN 12/13/2021 Osmani Aguayo, PT Student GP 2          PT G-Codes  Outcome Measure Options: (P) AM-PAC 6 Clicks Basic Mobility (PT)  AM-PAC 6 Clicks Score (PT): (P) 16    Osmani Aguayo PT Student  12/13/2021

## 2021-12-13 NOTE — PLAN OF CARE
Pt admitted to [facility] 2/2 to acute kidney injury, enteritis, hypothyroidism, and acute confusion felt to be related to PRES. Pmhx diabetes mellitus, hypertension, stage IV renal disease, restless leg syndrome. Pt lives with her  and two dependent grandchildren. Pt oriented x3, agreeable to OT eval this AM. Min A to assist to the EOB. Min A to stand - pt unsteady. Min A to transfer to the C. Pt stood with Min A and attempted mobility to sinkside - scissoring gait pattern and multiple LOB. She had to sit in a bedside chair on the other side of the bed before reaching sinkside. Pt reported fatigue and mobilized back to the bedside chair with Min A. Pt educated on rehab. Pt is interested in Acute Rehab pending her progress.    Patient was not  wearing a face mask during this therapy encounter. Therapist used appropriate personal protective equipment including mask, goggles and gloves. Mask used was standard procedure mask. Appropriate PPE was worn during the entire therapy session. Hand hygiene was completed before and after therapy session. Patient is not in enhanced droplet precautions.

## 2021-12-14 LAB
ALBUMIN SERPL-MCNC: 1.8 G/DL (ref 3.5–5.2)
ANION GAP SERPL CALCULATED.3IONS-SCNC: 10.2 MMOL/L (ref 5–15)
BASOPHILS # BLD AUTO: 0.04 10*3/MM3 (ref 0–0.2)
BASOPHILS NFR BLD AUTO: 0.4 % (ref 0–1.5)
BUN SERPL-MCNC: 41 MG/DL (ref 6–20)
BUN/CREAT SERPL: 11.8 (ref 7–25)
CALCIUM SPEC-SCNC: 7.5 MG/DL (ref 8.6–10.5)
CHLORIDE SERPL-SCNC: 103 MMOL/L (ref 98–107)
CO2 SERPL-SCNC: 24.8 MMOL/L (ref 22–29)
CREAT SERPL-MCNC: 3.48 MG/DL (ref 0.57–1)
DEPRECATED RDW RBC AUTO: 45 FL (ref 37–54)
EOSINOPHIL # BLD AUTO: 0.17 10*3/MM3 (ref 0–0.4)
EOSINOPHIL NFR BLD AUTO: 1.6 % (ref 0.3–6.2)
ERYTHROCYTE [DISTWIDTH] IN BLOOD BY AUTOMATED COUNT: 14.5 % (ref 12.3–15.4)
GFR SERPL CREATININE-BSD FRML MDRD: 14 ML/MIN/1.73
GFR SERPL CREATININE-BSD FRML MDRD: ABNORMAL ML/MIN/{1.73_M2}
GLUCOSE BLDC GLUCOMTR-MCNC: 166 MG/DL (ref 70–130)
GLUCOSE BLDC GLUCOMTR-MCNC: 215 MG/DL (ref 70–130)
GLUCOSE BLDC GLUCOMTR-MCNC: 242 MG/DL (ref 70–130)
GLUCOSE SERPL-MCNC: 156 MG/DL (ref 65–99)
HCT VFR BLD AUTO: 22.4 % (ref 34–46.6)
HGB BLD-MCNC: 7.3 G/DL (ref 12–15.9)
IMM GRANULOCYTES # BLD AUTO: 0.23 10*3/MM3 (ref 0–0.05)
IMM GRANULOCYTES NFR BLD AUTO: 2.2 % (ref 0–0.5)
LYMPHOCYTES # BLD AUTO: 1.6 10*3/MM3 (ref 0.7–3.1)
LYMPHOCYTES NFR BLD AUTO: 15 % (ref 19.6–45.3)
MCH RBC QN AUTO: 28.1 PG (ref 26.6–33)
MCHC RBC AUTO-ENTMCNC: 32.6 G/DL (ref 31.5–35.7)
MCV RBC AUTO: 86.2 FL (ref 79–97)
MONOCYTES # BLD AUTO: 0.94 10*3/MM3 (ref 0.1–0.9)
MONOCYTES NFR BLD AUTO: 8.8 % (ref 5–12)
NEUTROPHILS NFR BLD AUTO: 7.66 10*3/MM3 (ref 1.7–7)
NEUTROPHILS NFR BLD AUTO: 72 % (ref 42.7–76)
NRBC BLD AUTO-RTO: 0 /100 WBC (ref 0–0.2)
PHOSPHATE SERPL-MCNC: 3.1 MG/DL (ref 2.5–4.5)
PLATELET # BLD AUTO: 231 10*3/MM3 (ref 140–450)
PMV BLD AUTO: 11.2 FL (ref 6–12)
POTASSIUM SERPL-SCNC: 4.3 MMOL/L (ref 3.5–5.2)
RBC # BLD AUTO: 2.6 10*6/MM3 (ref 3.77–5.28)
SODIUM SERPL-SCNC: 138 MMOL/L (ref 136–145)
WBC NRBC COR # BLD: 10.64 10*3/MM3 (ref 3.4–10.8)

## 2021-12-14 PROCEDURE — 25010000002 NA FERRIC GLUC CPLX PER 12.5 MG: Performed by: INTERNAL MEDICINE

## 2021-12-14 PROCEDURE — 97110 THERAPEUTIC EXERCISES: CPT

## 2021-12-14 PROCEDURE — 85025 COMPLETE CBC W/AUTO DIFF WBC: CPT | Performed by: STUDENT IN AN ORGANIZED HEALTH CARE EDUCATION/TRAINING PROGRAM

## 2021-12-14 PROCEDURE — 63710000001 INSULIN GLARGINE PER 5 UNITS: Performed by: INTERNAL MEDICINE

## 2021-12-14 PROCEDURE — 25010000002 HEPARIN (PORCINE) PER 1000 UNITS: Performed by: INTERNAL MEDICINE

## 2021-12-14 PROCEDURE — 82962 GLUCOSE BLOOD TEST: CPT

## 2021-12-14 PROCEDURE — 63710000001 INSULIN LISPRO (HUMAN) PER 5 UNITS: Performed by: STUDENT IN AN ORGANIZED HEALTH CARE EDUCATION/TRAINING PROGRAM

## 2021-12-14 PROCEDURE — 97530 THERAPEUTIC ACTIVITIES: CPT

## 2021-12-14 PROCEDURE — 25010000002 EPOETIN ALFA-EPBX 3000 UNIT/ML SOLUTION: Performed by: INTERNAL MEDICINE

## 2021-12-14 PROCEDURE — 80069 RENAL FUNCTION PANEL: CPT | Performed by: STUDENT IN AN ORGANIZED HEALTH CARE EDUCATION/TRAINING PROGRAM

## 2021-12-14 PROCEDURE — 25010000002 CEFTRIAXONE PER 250 MG: Performed by: INTERNAL MEDICINE

## 2021-12-14 PROCEDURE — 63710000001 INSULIN LISPRO (HUMAN) PER 5 UNITS: Performed by: INTERNAL MEDICINE

## 2021-12-14 RX ORDER — AMLODIPINE BESYLATE 5 MG/1
5 TABLET ORAL
Status: DISCONTINUED | OUTPATIENT
Start: 2021-12-15 | End: 2021-12-17

## 2021-12-14 RX ORDER — HEPARIN SODIUM 1000 [USP'U]/ML
3800 INJECTION, SOLUTION INTRAVENOUS; SUBCUTANEOUS AS NEEDED
Status: DISCONTINUED | OUTPATIENT
Start: 2021-12-14 | End: 2021-12-17 | Stop reason: HOSPADM

## 2021-12-14 RX ORDER — ALBUMIN (HUMAN) 12.5 G/50ML
12.5 SOLUTION INTRAVENOUS AS NEEDED
Status: ACTIVE | OUTPATIENT
Start: 2021-12-14 | End: 2021-12-14

## 2021-12-14 RX ORDER — AMLODIPINE BESYLATE 2.5 MG/1
2.5 TABLET ORAL ONCE
Status: COMPLETED | OUTPATIENT
Start: 2021-12-14 | End: 2021-12-14

## 2021-12-14 RX ADMIN — NYSTATIN 500000 UNITS: 100000 SUSPENSION ORAL at 17:47

## 2021-12-14 RX ADMIN — HYDROCODONE BITARTRATE AND ACETAMINOPHEN 1 TABLET: 5; 325 TABLET ORAL at 11:53

## 2021-12-14 RX ADMIN — HEPARIN SODIUM 3800 UNITS: 1000 INJECTION, SOLUTION INTRAVENOUS; SUBCUTANEOUS at 11:18

## 2021-12-14 RX ADMIN — NYSTATIN 500000 UNITS: 100000 SUSPENSION ORAL at 08:11

## 2021-12-14 RX ADMIN — OLANZAPINE 5 MG: 5 TABLET ORAL at 14:44

## 2021-12-14 RX ADMIN — INSULIN LISPRO 2 UNITS: 100 INJECTION, SOLUTION INTRAVENOUS; SUBCUTANEOUS at 17:47

## 2021-12-14 RX ADMIN — ROPINIROLE HYDROCHLORIDE 0.75 MG: 0.5 TABLET, FILM COATED ORAL at 20:19

## 2021-12-14 RX ADMIN — METOPROLOL TARTRATE 25 MG: 25 TABLET, FILM COATED ORAL at 20:20

## 2021-12-14 RX ADMIN — INSULIN LISPRO 2 UNITS: 100 INJECTION, SOLUTION INTRAVENOUS; SUBCUTANEOUS at 08:11

## 2021-12-14 RX ADMIN — NYSTATIN 500000 UNITS: 100000 SUSPENSION ORAL at 20:20

## 2021-12-14 RX ADMIN — NYSTATIN 500000 UNITS: 100000 SUSPENSION ORAL at 14:43

## 2021-12-14 RX ADMIN — INSULIN LISPRO 2 UNITS: 100 INJECTION, SOLUTION INTRAVENOUS; SUBCUTANEOUS at 17:46

## 2021-12-14 RX ADMIN — MULTIPLE VITAMINS W/ MINERALS TAB 1 TABLET: TAB at 14:44

## 2021-12-14 RX ADMIN — ASPIRIN 81 MG: 81 TABLET, CHEWABLE ORAL at 14:44

## 2021-12-14 RX ADMIN — HYDROCODONE BITARTRATE AND ACETAMINOPHEN 1 TABLET: 5; 325 TABLET ORAL at 21:31

## 2021-12-14 RX ADMIN — SODIUM CHLORIDE 125 MG: 9 INJECTION, SOLUTION INTRAVENOUS at 11:17

## 2021-12-14 RX ADMIN — INSULIN LISPRO 3 UNITS: 100 INJECTION, SOLUTION INTRAVENOUS; SUBCUTANEOUS at 06:39

## 2021-12-14 RX ADMIN — FOLIC ACID 1 MG: 1 TABLET ORAL at 14:44

## 2021-12-14 RX ADMIN — AMLODIPINE BESYLATE 2.5 MG: 2.5 TABLET ORAL at 14:44

## 2021-12-14 RX ADMIN — LINAGLIPTIN 5 MG: 5 TABLET, FILM COATED ORAL at 14:44

## 2021-12-14 RX ADMIN — SODIUM CHLORIDE, PRESERVATIVE FREE 10 ML: 5 INJECTION INTRAVENOUS at 20:21

## 2021-12-14 RX ADMIN — HEPARIN SODIUM 5000 UNITS: 5000 INJECTION INTRAVENOUS; SUBCUTANEOUS at 06:19

## 2021-12-14 RX ADMIN — TAMSULOSIN HYDROCHLORIDE 0.4 MG: 0.4 CAPSULE ORAL at 14:44

## 2021-12-14 RX ADMIN — METOPROLOL TARTRATE 25 MG: 25 TABLET, FILM COATED ORAL at 14:46

## 2021-12-14 RX ADMIN — SODIUM CHLORIDE, PRESERVATIVE FREE 10 ML: 5 INJECTION INTRAVENOUS at 08:12

## 2021-12-14 RX ADMIN — INSULIN GLARGINE 5 UNITS: 100 INJECTION, SOLUTION SUBCUTANEOUS at 06:39

## 2021-12-14 RX ADMIN — OLANZAPINE 5 MG: 5 TABLET ORAL at 20:20

## 2021-12-14 RX ADMIN — EPOETIN ALFA-EPBX 6000 UNITS: 3000 INJECTION, SOLUTION INTRAVENOUS; SUBCUTANEOUS at 11:16

## 2021-12-14 RX ADMIN — Medication 250 MG: at 14:43

## 2021-12-14 RX ADMIN — PANTOPRAZOLE SODIUM 40 MG: 40 TABLET, DELAYED RELEASE ORAL at 14:46

## 2021-12-14 RX ADMIN — HEPARIN SODIUM 5000 UNITS: 5000 INJECTION INTRAVENOUS; SUBCUTANEOUS at 21:32

## 2021-12-14 RX ADMIN — CEFTRIAXONE 1 G: 1 INJECTION, POWDER, FOR SOLUTION INTRAMUSCULAR; INTRAVENOUS at 06:19

## 2021-12-14 RX ADMIN — HEPARIN SODIUM 5000 UNITS: 5000 INJECTION INTRAVENOUS; SUBCUTANEOUS at 14:43

## 2021-12-14 RX ADMIN — AMLODIPINE BESYLATE 2.5 MG: 2.5 TABLET ORAL at 20:19

## 2021-12-14 RX ADMIN — LEVOTHYROXINE SODIUM 125 MCG: 0.12 TABLET ORAL at 06:19

## 2021-12-14 RX ADMIN — Medication 5 MG: at 20:20

## 2021-12-14 NOTE — PLAN OF CARE
Goal Outcome Evaluation:  Plan of Care Reviewed With: patient        Progress: improving  Outcome Summary: VSS. Pt had dialysis today. BP elevated today but coing down. Norvasc increased to 5mg daily. Pt worked with Pt. Bg better today. Pt remains alert and oriented. Will continue to monitor.

## 2021-12-14 NOTE — THERAPY TREATMENT NOTE
Patient Name: Zaina Martinez  : 1965    MRN: [id number]                              Today's Date: 2021       Admit Date: 2021    Visit Dx:     ICD-10-CM ICD-9-CM   1. Acute renal failure, unspecified acute renal failure type (HCC)  N17.9 584.9   2. Uncontrolled hypertension  I10 401.9   3. Anemia, unspecified type  D64.9 285.9   4. Elevated troponin  R77.8 790.6   5. Impaired mobility and activities of daily living  Z74.09 V49.89    Z78.9      Patient Active Problem List   Diagnosis   • MAYUR (acute kidney injury) (HCC)   • HTN (hypertension)   • Hypothyroidism   • Type 2 diabetes mellitus with hyperglycemia, with long-term current use of insulin (HCC)   • Rheumatoid arthritis (HCC)   • Angioedema   • GERD (gastroesophageal reflux disease)   • Anemia   • Medically noncompliant   • Myocardial infarction due to demand ischemia (HCC)   • Enteritis   • PRES (posterior reversible encephalopathy syndrome)   • Urine retention   • Klebsiella infection   • Superficial thrombophlebitis     Past Medical History:   Diagnosis Date   • Diabetes (HCC)    • Disease of thyroid gland    • GERD (gastroesophageal reflux disease)    • Hypertension    • Rheumatoid arthritis (HCC)      Past Surgical History:   Procedure Laterality Date   • EYE SURGERY     • HYSTERECTOMY     • INSERTION HEMODIALYSIS CATHETER N/A 2021    Procedure: HEMODIALYSIS CATHETER INSERTION;  Surgeon: Keli Salazar MD;  Location: [facility] ;  Service: Vascular;  Laterality: N/A;      General Information     Row Name 21 1011          Physical Therapy Time and Intention    Document Type therapy note (daily note)  -AR     Mode of Treatment physical therapy  -AR     Row Name 21 1011          General Information    Patient Profile Reviewed yes  -AR     Existing Precautions/Restrictions fall  -AR     Row Name 21 1011          Cognition    Orientation Status (Cognition) oriented x 3  -AR     Row Name 21 1011           Safety Issues, Functional Mobility    Impairments Affecting Function (Mobility) balance; coordination; endurance/activity tolerance; motor control; strength; postural/trunk control; shortness of breath  -AR           User Key  (r) = Recorded By, (t) = Taken By, (c) = Cosigned By    Initials Name Provider Type    Nell Treadwell, PT Physical Therapist               Mobility     Row Name 12/14/21 1011          Bed Mobility    Bed Mobility supine-sit  -AR     Supine-Sit Will (Bed Mobility) standby assist  -AR     Sit-Supine Will (Bed Mobility) standby assist  -AR     Assistive Device (Bed Mobility) bed rails; head of bed elevated  -AR     Row Name 12/14/21 1011          Transfers    Comment (Transfers) sit<>stand from bed 5 times, cues for UE placement  -AR     Row Name 12/14/21 1011          Sit-Stand Transfer    Sit-Stand Will (Transfers) verbal cues; contact guard  -AR     Assistive Device (Sit-Stand Transfers) walker, front-wheeled  -AR     Row Name 12/14/21 1011          Gait/Stairs (Locomotion)    [place] Level (Gait) minimum assist (75% patient effort)  -AR     Assistive Device (Gait) walker, front-wheeled  -AR     Distance in Feet (Gait) 10'+15'+15'+40' with sitting rest break for few minutes between each stand.  minimally unsteady, especially during turns.  Sitting rest breaks due to fatigue/weakness  -AR     Deviations/Abnormal Patterns (Gait) festinating/shuffling; gait speed decreased  -AR     Bilateral Gait Deviations heel strike decreased; forward flexed posture  -AR           User Key  (r) = Recorded By, (t) = Taken By, (c) = Cosigned By    Initials Name Provider Type    Nell Traedwell PT Physical Therapist               Obj/Interventions     Row Name 12/14/21 1013          Motor Skills    Therapeutic Exercise --  standing marches 10x; CGA w/ RW.  cues for higher knees and slow/controlled.  Unable to complete further standing exercises 2/2 fatigue.  -AR      Row Name 12/14/21 1013          Balance    Balance Assessment standing dynamic balance; standing static balance  -AR     Static Standing Balance mild impairment; supported  -AR     Dynamic Standing Balance mild impairment; supported  -AR           User Key  (r) = Recorded By, (t) = Taken By, (c) = Cosigned By    Initials Name Provider Type    Nell Treadwell, PT Physical Therapist               Goals/Plan    No documentation.                Clinical Impression     Row Name 12/14/21 1014          Pain    Additional Documentation Pain Scale: Numbers Pre/Post-Treatment (Group)  -AR     [facility] Name 12/14/21 1014          Pain Scale: Numbers Pre/Post-Treatment    Pretreatment Pain Rating 0/10 - no pain  -AR     Posttreatment Pain Rating 0/10 - no pain  -AR     Pain Intervention(s) Repositioned  -AR     Row Name 12/14/21 1014          Plan of Care Review    Plan of Care Reviewed With patient  -AR     Outcome Summary Great progress towards goals during PT today.  Able to ambulate 10'+15'+15'+40' with min A using RW.  Required a sitting rest break for few minutes between each short walk due to fatigue/weakness.  Performed standing marches also.  Currently recommend DC to SNU, but potential for progress to DC home w/ home PT, RW, BSC, and transport chair.  Discussed w/ pt.  -AR     Row Name 12/14/21 1014          Therapy Assessment/Plan (PT)    Rehab Potential (PT) good, to achieve stated therapy goals  -AR     Criteria for Skilled Interventions Met (PT) yes  -AR     Row Name 12/14/21 1014          Vital Signs    O2 Delivery Pre Treatment room air  -AR     O2 Delivery Intra Treatment room air  -AR     Post SpO2 (%) 96  -AR     O2 Delivery Post Treatment room air  -AR     Row Name 12/14/21 1014          Positioning and Restraints    Pre-Treatment Position in bed  -AR     Post Treatment Position bed  going to HD soon  -AR     In Bed notified nsg; supine; call light within reach; encouraged to call for assist; exit alarm on   -AR           User Key  (r) = Recorded By, (t) = Taken By, (c) = Cosigned By    Initials Name Provider Type    Nell Treadwell PT Physical Therapist               Outcome Measures     Row Name 12/14/21 1016          How much help from another person do you currently need...    Turning from your back to your side while in flat bed without using bedrails? 4  -AR     Moving from lying on back to sitting on the side of a flat bed without bedrails? 3  -AR     Moving to and from a bed to a chair (including a wheelchair)? 3  -AR     Standing up from a chair using your arms (e.g., wheelchair, bedside chair)? 3  -AR     Climbing 3-5 steps with a railing? 2  -AR     To walk in hospital room? 3  -AR     AM-PAC 6 Clicks Score (PT) 18  -AR     Row Name 12/14/21 1016          Functional Assessment    Outcome Measure Options AM-PAC 6 Clicks Basic Mobility (PT)  -AR           User Key  (r) = Recorded By, (t) = Taken By, (c) = Cosigned By    Initials Name Provider Type    Nell Treadwell PT Physical Therapist                             Physical Therapy Education                 Title: PT OT SLP Therapies (In Progress)     Topic: Physical Therapy (In Progress)     Point: Mobility training (In Progress)     Learning Progress Summary           Patient Acceptance, E, NR by AR at 12/14/2021 1016   Family Acceptance, E,D, NR by JERZY at 12/13/2021 1512      Show all documentation for this point (2)                 Point: Home exercise program (In Progress)     Learning Progress Summary           Patient Acceptance, E, NR by AR at 12/14/2021 1016   Family Acceptance, E,D, NR by JERZY at 12/13/2021 1512      Show all documentation for this point (2)                 Point: Body mechanics (In Progress)     Learning Progress Summary           Patient Acceptance, E, NR by AR at 12/14/2021 1016   Family Acceptance, E,D, NR by JERZY at 12/13/2021 1512      Show all documentation for this point (2)                 Point: Precautions (In  Progress)     Learning Progress Summary           Patient Acceptance, E, NR by AR at 12/14/2021 1016   Family Acceptance, E,D, NR by JERZY at 12/13/2021 1512      Show all documentation for this point (2)                             User Key     Initials Effective Dates Name Provider Type Discipline    AR 06/16/21 -  Nell Aragon, PT Physical Therapist PT    JERZY 10/25/21 -  Osmani Aguayo, PT Student PT Student PT              PT Recommendation and Plan     Plan of Care Reviewed With: patient  Outcome Summary: Great progress towards goals during PT today.  Able to ambulate 10'+15'+15'+40' with min A using RW.  Required a sitting rest break for few minutes between each short walk due to fatigue/weakness.  Performed standing marches also.  Currently recommend DC to SNU, but potential for progress to DC home w/ home PT, RW, BSC, and transport chair.  Discussed w/ pt.     Time Calculation:    PT Charges     Row Name 12/14/21 1011             Time Calculation    Start Time 0825  -AR      Stop Time 0904  -AR      Time Calculation (min) 39 min  -AR      PT Received On 12/14/21  -AR      PT - Next Appointment 12/15/21  -AR            User Key  (r) = Recorded By, (t) = Taken By, (c) = Cosigned By    Initials Name Provider Type    AR Nell Aragon PT Physical Therapist              Therapy Charges for Today     Code Description Service Date Service Provider Modifiers Qty    06726786207 HC PT THER PROC EA 15 MIN 12/14/2021 Nell Aragon, PT GP 2    56896461377 HC PT THERAPEUTIC ACT EA 15 MIN 12/14/2021 Nell Aragon PT GP 1          PT G-Codes  Outcome Measure Options: AM-PAC 6 Clicks Basic Mobility (PT)  AM-PAC 6 Clicks Score (PT): 18  AM-PAC 6 Clicks Score (OT): 15  Modified Iza Scale: 4 - Moderately severe disability.  Unable to walk without assistance, and unable to attend to own bodily needs without assistance.    Nell Aragon PT  12/14/2021

## 2021-12-14 NOTE — CASE MANAGEMENT/SOCIAL WORK
Continued Stay Note  [facility]     Patient Name: Zaina Martinez  MRN: [id number]  Today's Date: 12/14/2021    Admit Date: 11/29/2021     Discharge Plan     Row Name 12/14/21 1633       Plan    Plan SNF with HD setup, referrals pending. (currently no beds at [facility], [facility]). Needs [facility] pre cert for SNF.    Patient/Family in Agreement with Plan yes    Plan Comments Per Moreno/Gi[facility]pe, [facility] and [facility] have no beds available. Called [facility] regarding referrals to [facility] and [facility].Per Isaura Rivera currently have no beds but she will check for other facility. Pt without behaviors for over 48 hrs now. Restraints removed 12/11 0700. Will follow up with [facility] in AM. Needs [facility] pre cert. Francisco J Patterson RN-BC               Discharge Codes    No documentation.               Expected Discharge Date and Time     Expected Discharge Date Expected Discharge Time    Dec 17, 2021             Francisco J Patterson RN

## 2021-12-14 NOTE — PLAN OF CARE
Goal Outcome Evaluation:  Plan of Care Reviewed With: patient           Outcome Summary: Great progress towards goals during PT today.  Able to ambulate 10'+15'+15'+40' with min A using RW.  Required a sitting rest break for few minutes between each short walk due to fatigue/weakness.  Performed standing marches also.  Currently recommend DC to SNU, but potential for progress to DC home w/ home PT, RW, BSC, and transport chair.  Discussed w/ pt.

## 2021-12-14 NOTE — PROGRESS NOTES
Name: Zaina Martinez ADMIT: 2021   : 1965  PCP: Provider, No Known    MRN: [id number] LOS: 15 days   AGE/SEX: 56 y.o. female  ROOM: [facility]     Subjective   Subjective   Patient seen this morning.  No acute events overnight.  Had 1 BM since yesterday.  Complains of back pain, denies any lower extremity weakness.    Review of Systems   As above  Objective   Objective   Vital Signs  /84, RR 16  SpO2:  [93 %-100 %] 100 %  on  Flow (L/min):  [2-4.5] 2;   Device (Oxygen Therapy): room air  Body mass index is 24.69 kg/m².  Physical Exam  Constitutional:Awake, alert  HENT: NCAT, mucous membranes moist, neck supple  Respiratory: Clear to auscultation bilaterally, respiratory effort normal, nonlabored breathing   Cardiovascular: RRR, normal radial pulses  Gastrointestinal: Positive bowel sounds, soft, nontender, nondistended  Musculoskeletal: Normal musculature for age, minimal lower extremity edema, BMI 25  Neurologic:  Oriented,  Moving all extremities,follows commands  Skin: No rashes or jaundice, warm     Results Review     I reviewed the patient's new clinical results.  Results from last 7 days   Lab Units 21  0752 21  1229 21  0621  0602 21  0511 21  0511   WBC 10*3/mm3 10.64  --  9.59 9.73  --  12.43*   HEMOGLOBIN g/dL 7.3* 8.0* 6.7* 7.5*   < > 7.7*   PLATELETS 10*3/mm3 231  --  202 233  --  230    < > = values in this interval not displayed.     Results from last 7 days   Lab Units 21  0752 21  0627 21  0602 21  0511   SODIUM mmol/L 138 138 137 136   POTASSIUM mmol/L 4.3 4.5 4.2 4.1   CHLORIDE mmol/L 103 106 104 103   CO2 mmol/L 24.8 26.1 24.4 23.4   BUN mg/dL 41* 31* 19 24*   CREATININE mg/dL 3.48* 3.22* 2.34* 3.03*   GLUCOSE mg/dL 156* 136* 334* 202*   Estimated Creatinine Clearance: 15.5 mL/min (A) (by C-G formula based on SCr of 3.48 mg/dL (H)).  Results from last 7 days   Lab Units 21  0752 21  0627 21  0602  12/11/21  0511   ALBUMIN g/dL 1.80* 1.60* 1.60* 1.70*     Results from last 7 days   Lab Units 12/14/21  0752 12/13/21  0627 12/12/21  0602 12/11/21  0511   CALCIUM mg/dL 7.5* 7.4* 7.6* 7.8*   ALBUMIN g/dL 1.80* 1.60* 1.60* 1.70*   PHOSPHORUS mg/dL 3.1 2.7 2.5 4.6*       COVID19   Date Value Ref Range Status   12/06/2021 Not Detected Not Detected - Ref. Range Final   11/29/2021 Not Detected Not Detected - Ref. Range Final     Glucose   Date/Time Value Ref Range Status   12/14/2021 1627 166 (H) 70 - 130 mg/dL Final     Comment:     Meter: [id number] : [id number] [place] NA   12/14/2021 0634 215 (H) 70 - 130 mg/dL Final     Comment:     Meter: [id number] : [id number] Mark Marlena NA   12/13/2021 2028 164 (H) 70 - 130 mg/dL Final     Comment:     Meter: [id number] : [id number] Mark Marlena NA   12/13/2021 1641 119 70 - 130 mg/dL Final     Comment:     Meter: [id number] : [id number] Hadley Ju NA   12/13/2021 1139 171 (H) 70 - 130 mg/dL Final     Comment:     Meter: [id number] : [id number] Hadley Ju NA   12/13/2021 0552 144 (H) 70 - 130 mg/dL Final     Comment:     Meter: [id number] : [id number] Jose Enid NA   12/12/2021 2053 214 (H) 70 - 130 mg/dL Final     Comment:     Meter: [id number] : [id number] Jose Enid NA       XR Chest 1 View  PORTABLE CHEST 12/11/2021 AT 12:40 PM     CLINICAL HISTORY: Confusion. Leukocytosis. Intermittent cough.     Compared to a previous chest dated 12/06/2021.     Right internal jugular dialysis catheter remains in place in  satisfactory position without change. The lungs are better inflated than  on the previous chest x-ray. There remain free of focal infiltrates. A  tiny left pleural effusion is suspected. The heart is normal in size.  The pulmonary vasculature limits.     This report was finalized on 12/11/2021 1:14 PM by Dr. Darien Barron M.D.       Scheduled Medications  amLODIPine, 2.5 mg, Oral, Q24H  aspirin, 81 mg, Oral, Daily  folic  acid, 1 mg, Oral, Daily  heparin (porcine), 5,000 Units, Subcutaneous, Q8H  insulin glargine, 5 Units, Subcutaneous, QAM  insulin lispro, 0-7 Units, Subcutaneous, TID AC  insulin lispro, 2 Units, Subcutaneous, TID With Meals  levothyroxine, 125 mcg, Oral, Q AM  linagliptin, 5 mg, Oral, Daily  melatonin, 5 mg, Oral, Nightly  metoprolol tartrate, 25 mg, Oral, Q12H  multivitamin with minerals, 1 tablet, Oral, Daily  nystatin, 5 mL, Swish & Swallow, 4x Daily  OLANZapine, 5 mg, Oral, BID  pantoprazole, 40 mg, Oral, Daily  rOPINIRole, 0.75 mg, Oral, Nightly  sodium chloride, 10 mL, Intravenous, Q12H  tamsulosin, 0.4 mg, Oral, Daily  thiamine, 250 mg, Oral, Daily    Infusions  sodium chloride, 9 mL/hr, Last Rate: 9 mL/hr (12/06/21 1210)    Diet  Diet Regular       Assessment/Plan     Active Hospital Problems    Diagnosis  POA   • **MAYUR (acute kidney injury) ([facility]) [N17.9]  Yes   • Superficial thrombophlebitis [I80.9]  Clinically Undetermined   • Urine retention [R33.9]  Yes   • Klebsiella infection [A49.8]  Yes   • PRES (posterior reversible encephalopathy syndrome) [I67.83]  Yes   • Enteritis [K52.9]  Clinically Undetermined   • Medically noncompliant [Z91.19]  Not Applicable   • Myocardial infarction due to demand ischemia ([facility]) [I21.A1]  Yes   • Angioedema [T78.3XXA]  Yes   • Anemia [D64.9]  Yes   • HTN (hypertension) [I10]  Yes   • Hypothyroidism [E03.9]  Yes   • Type 2 diabetes mellitus with hyperglycemia, with long-term current use of insulin ([facility]) [E11.65, Z79.4]  Not Applicable   • Rheumatoid arthritis ([facility]) [M06.9]  Yes      Resolved Hospital Problems    Diagnosis Date Resolved POA   • Hypertensive urgency [I16.0] 11/30/2021 Yes       Zaina Martinez is a 56 y.o. female presents with acute kidney injury, enteritis, hypothyroidism, and acute confusion felt to be related to PRES.          Renal failure:  Now on dialysis, new this admission.      Psychosis, possible undiagnosed bipolar syndrome with acute psychotic manic  phase:   feels patient has undiagnosed bipolar and has had previous episode of psychosis at her last hospitalization when she had COVID-19.  Plan for mood stabilizer, patient has required restraints.  Psychosis now resolved.  Zyprexa 5mg bid  -psychiatry following      PRES, multifactorial metabolic encephalopathy:   PRES Diagnosed by neurology on MRI.  Working to gently regulate blood pressure.  On amlodipine 2.5 mg daily, metoprolol 25 mg every 12  increased amlodipine to 5 mg daily      Hypothyroidism:   -TSH is severely elevated at 152.      -Patient must be noncompliant with thyroid medicine.    -Synthroid adjusted.    -Recommend she have free T4 and TSH within 1 month to continue to monitor.    -Patient admits to missing her thyroid medication.  I explained to her that this is a very important medication.     Enteritis and UTI:  Abdominal symptoms resolved.    Finishing ceftriaxone     Diabetes: Monitor glucose and adjust as needed.  A1c 9.8.  Poorly controlled.  Started Tradjenta.  Correction insulin.  Glucose improved     Demand ischemia: Medical management.  Cardiology has evaluated.  Normal echocardiogram.  Needs outpatient follow-up with cardiology.  She agrees with this plan.     Severe anemia:  Status post transfusion.  Hemoccult negative.  No active bleeding reported.  Normocytic.  Mostly consistent with anemia of chronic disease or chronic kidney disease.    B12 relatively normal.  Folic acid is borderline low.  Started Folic acid supplements.     Urine retention: Plan to trial Flomax.  Now better     Debility: PT.  Skilled facility when possible.     DVT Prophylaxis: Mechanical  Disposition: Skilled facility ,pending placement     Albert Templeton MD  [facility]  12/14/21  16:45 EST

## 2021-12-14 NOTE — PROGRESS NOTES
"   LOS: 15 days    Patient Care Team:  Provider, No Known as PCP - General    Chief Complaint:    Chief Complaint   Patient presents with   • Altered Mental Status     Follow up MAYUR CKD4  Subjective     Interval History:   Follow up MAYUR on CKD IV.  On dialysis.  No issues with cramping.  Bowels moved once yesterday.  UOP not recorded. Not weighed.   BP stable, but one outlier.  Some lumbar pain. Feels better if she lies on her side to relieve pressure. Eating.   Objective     Vital Signs  Temp:  [97.9 °F (36.6 °C)-99.1 °F (37.3 °C)] 98 °F (36.7 °C)  Heart Rate:  [72-80] 76  Resp:  [16-20] 16  BP: (147-183)/(72-91) 151/84    Flowsheet Rows      First Filed Value   Admission Height 157.5 cm (62\") Documented at 11/29/2021 2011   Admission Weight 59 kg (130 lb) Documented at 11/30/2021 0200          I/O this shift:  In: 100 [IV Piggyback:100]  Out: -   I/O last 3 completed shifts:  In: 630 [P.O.:630]  Out: -     Intake/Output Summary (Last 24 hours) at 12/14/2021 1208  Last data filed at 12/14/2021 1117  Gross per 24 hour   Intake 730 ml   Output --   Net 730 ml       Physical Exam:  General Appearance: Chronically ill. On dialysis.  TDC.  Qb 400.   Neck no jvd  Vascular: Right chest TDC  Heart RRR No s3 or rub.   Lungs clear to auscultation, no wheezing.   Abd + bs, soft nontender.   Ext No pedal/ankle edema                   Results Review:    Results from last 7 days   Lab Units 12/14/21  0752 12/13/21  0627 12/12/21  0602   SODIUM mmol/L 138 138 137   POTASSIUM mmol/L 4.3 4.5 4.2   CHLORIDE mmol/L 103 106 104   CO2 mmol/L 24.8 26.1 24.4   BUN mg/dL 41* 31* 19   CREATININE mg/dL 3.48* 3.22* 2.34*   CALCIUM mg/dL 7.5* 7.4* 7.6*   GLUCOSE mg/dL 156* 136* 334*       Estimated Creatinine Clearance: 15.5 mL/min (A) (by C-G formula based on SCr of 3.48 mg/dL (H)).    Results from last 7 days   Lab Units 12/14/21  0752 12/13/21  0627 12/12/21  0602   PHOSPHORUS mg/dL 3.1 2.7 2.5             Results from last 7 days   Lab " Units 12/14/21  0752 12/13/21  1229 12/13/21  0627 12/12/21  0602 12/11/21  0511 12/10/21  0604 12/10/21  0604   WBC 10*3/mm3 10.64  --  9.59 9.73 12.43*  --  13.08*   HEMOGLOBIN g/dL 7.3* 8.0* 6.7* 7.5* 7.7*   < > 8.3*   PLATELETS 10*3/mm3 231  --  202 233 230  --  250    < > = values in this interval not displayed.               Imaging Results (Last 24 Hours)     ** No results found for the last 24 hours. **        amLODIPine, 2.5 mg, Oral, Q24H  aspirin, 81 mg, Oral, Daily  sodium ferric gluconate complex IVPB in 100 mL NS, 125 mg, Intravenous, Once in Dialysis  folic acid, 1 mg, Oral, Daily  heparin (porcine), 5,000 Units, Subcutaneous, Q8H  insulin glargine, 5 Units, Subcutaneous, QAM  insulin lispro, 0-7 Units, Subcutaneous, TID AC  insulin lispro, 2 Units, Subcutaneous, TID With Meals  levothyroxine, 125 mcg, Oral, Q AM  linagliptin, 5 mg, Oral, Daily  melatonin, 5 mg, Oral, Nightly  metoprolol tartrate, 25 mg, Oral, Q12H  multivitamin with minerals, 1 tablet, Oral, Daily  nystatin, 5 mL, Swish & Swallow, 4x Daily  OLANZapine, 5 mg, Oral, BID  pantoprazole, 40 mg, Oral, Daily  rOPINIRole, 0.75 mg, Oral, Nightly  sodium chloride, 10 mL, Intravenous, Q12H  tamsulosin, 0.4 mg, Oral, Daily  thiamine, 250 mg, Oral, Daily      sodium chloride, 9 mL/hr, Last Rate: 9 mL/hr (12/06/21 1210)        Medication Review:   Current Facility-Administered Medications   Medication Dose Route Frequency Provider Last Rate Last Admin   • acetaminophen (TYLENOL) tablet 650 mg  650 mg Oral Q4H PRN Keli Salazar MD   650 mg at 12/13/21 2012   • albumin human 25 % IV SOLN 12.5 g  12.5 g Intravenous PRN Alejo Lange MD       • amLODIPine (NORVASC) tablet 2.5 mg  2.5 mg Oral Q24H Alejo Lange MD   2.5 mg at 12/13/21 1728   • aspirin chewable tablet 81 mg  81 mg Oral Daily Keli Salazar MD   81 mg at 12/13/21 0829   • dextrose (D50W) (25 g/50 mL) IV injection 25 g  25 g Intravenous Q15 Min PRN  Keli Salazar MD   25 g at 12/08/21 1223   • dextrose (GLUTOSE) oral gel 15 g  15 g Oral Q15 Min PRN Keli Salazar MD   15 g at 12/08/21 1502   • ferric gluconate (FERRLECIT) 125 mg in sodium chloride 0.9 % 100 mL IVPB  125 mg Intravenous Once in Dialysis Alejo Lange  mL/hr at 12/14/21 1117 125 mg at 12/14/21 1117   • folic acid (FOLVITE) tablet 1 mg  1 mg Oral Daily Keli Salazar MD   1 mg at 12/13/21 0829   • glucagon (human recombinant) (GLUCAGEN DIAGNOSTIC) injection 1 mg  1 mg Subcutaneous PRN Keli Salazar MD       • heparin (porcine) 5000 UNIT/ML injection 5,000 Units  5,000 Units Subcutaneous Q8H Osmar Alva MD   5,000 Units at 12/14/21 0619   • heparin (porcine) injection 3,800 Units  3,800 Units Intracatheter PRN Alejo Lange MD   3,800 Units at 12/14/21 1118   • HYDROcodone-acetaminophen (NORCO) 5-325 MG per tablet 1 tablet  1 tablet Oral Q6H PRN Osmar Alva MD   1 tablet at 12/14/21 1153   • insulin glargine (LANTUS, SEMGLEE) injection 5 Units  5 Units Subcutaneous QAM Osmar Alva MD   5 Units at 12/14/21 0639   • insulin lispro (ADMELOG) injection 0-7 Units  0-7 Units Subcutaneous TID AC Keli Salazar MD   3 Units at 12/14/21 0639   • insulin lispro (ADMELOG) injection 2 Units  2 Units Subcutaneous TID With Meals Osmar Alva MD   2 Units at 12/14/21 0811   • labetalol (NORMODYNE,TRANDATE) injection 10 mg  10 mg Intravenous Q6H PRN Keli Salazar MD   10 mg at 12/08/21 1246   • levothyroxine (SYNTHROID, LEVOTHROID) tablet 125 mcg  125 mcg Oral Q AM Osmar Alva MD   125 mcg at 12/14/21 0619   • linagliptin (TRADJENTA) tablet 5 mg  5 mg Oral Daily Keli Salazar MD   5 mg at 12/13/21 0829   • loperamide (IMODIUM) capsule 2 mg  2 mg Oral 4x Daily PRN Keli Salazar MD   2 mg at 12/13/21 1455   • melatonin tablet 5 mg  5 mg Oral Nightly Osmar Alva MD    5 mg at 12/13/21 2012   • metoprolol tartrate (LOPRESSOR) tablet 25 mg  25 mg Oral Q12H Keli Salazar MD   25 mg at 12/13/21 2014   • multivitamin with minerals 1 tablet  1 tablet Oral Daily Osmar Alva MD   1 tablet at 12/13/21 0829   • nitroglycerin (NITROSTAT) SL tablet 0.4 mg  0.4 mg Sublingual Q5 Min PRN Keli Salazar MD       • nystatin (MYCOSTATIN) 100,000 unit/mL suspension 500,000 Units  5 mL Swish & Swallow 4x Daily Osmar Alva MD   500,000 Units at 12/14/21 0811   • OLANZapine (zyPREXA) injection 5 mg  5 mg Intramuscular Q8H PRN Yordan Suero III, MD   5 mg at 12/10/21 1851   • OLANZapine (zyPREXA) tablet 5 mg  5 mg Oral BID Osmar Alva MD   5 mg at 12/13/21 2011   • pantoprazole (PROTONIX) EC tablet 40 mg  40 mg Oral Daily Keli Salazar MD   40 mg at 12/13/21 1109   • prochlorperazine (COMPAZINE) injection 5 mg  5 mg Intravenous Q6H PRN Keli Salazar MD       • rOPINIRole (REQUIP) tablet 0.75 mg  0.75 mg Oral Nightly Keli Salazar MD   0.75 mg at 12/13/21 2011   • sodium chloride 0.9 % flush 10 mL  10 mL Intravenous Q12H Keli Salazar MD   10 mL at 12/14/21 0812   • sodium chloride 0.9 % flush 10 mL  10 mL Intravenous PRN Keli Salazar MD       • sodium chloride 0.9 % infusion  9 mL/hr Intravenous Continuous PRN Keli Salazar MD 9 mL/hr at 12/06/21 1210 9 mL/hr at 12/06/21 1210   • tamsulosin (FLOMAX) 24 hr capsule 0.4 mg  0.4 mg Oral Daily Osmar Alva MD   0.4 mg at 12/13/21 0829   • thiamine (VITAMIN B-1) tablet 250 mg  250 mg Oral Daily Osmar Alva MD   250 mg at 12/13/21 0829       Assessment/Plan   1.  MAYUR on CKD stage 4, now dialysis-dependent.  Likely new ESRD>  Outpt spot being arranged .  2.   Confusion.  Multifactorial secondary possible to metabolic encephalopathy in addition to PRES. Improving.   3.  DM2 with poor control  4.  Anemia secondary to iron  deficiency anemia/CKD.  Ferritin was 100 and iron saturation 21. Ferrlecit with HD today .  5.  Hypertension, hydralazine changed to norvasc yesterday.   6.  Type II non-NSTEMI  7.  Severe hypothyroidism.  On replacement.  8.  Medication noncompliance  9.  Diarrhea, resolved       Plan  1.  IV iron today with HD.        Ruth Lira MD  12/14/21  12:08 EST

## 2021-12-14 NOTE — NURSING NOTE
HD WITHOUT INCIDENT OR C/O. TOLERATED WELL. REMOVED 2 L AS ORDERED. FERRLECIT, RETACRIT AS ORDERED. NORCO 5/325 FOR C/O BACK DISCOMFORT. RAUL CURRENT, CDI WITH [facility]. STABLE, NO C/O UPON COMPLETION OF TREATMENT AND RETURN TO ROOM.

## 2021-12-14 NOTE — PLAN OF CARE
Goal Outcome Evaluation:           Progress: improving  Outcome Summary: VSS afebrile. Medicated at begining of shift with tylenol for mid back pain then appears to have slept during the night. Uneventful shift.

## 2021-12-14 NOTE — PROGRESS NOTES
Patient is unavailable for interview today, but charting indicates that she is making great progress.  I will check on her tomorrow and then probably  sign off

## 2021-12-15 LAB
ALBUMIN SERPL-MCNC: 2 G/DL (ref 3.5–5.2)
ANION GAP SERPL CALCULATED.3IONS-SCNC: 7.7 MMOL/L (ref 5–15)
BUN SERPL-MCNC: 25 MG/DL (ref 6–20)
BUN/CREAT SERPL: 9.7 (ref 7–25)
CALCIUM SPEC-SCNC: 7.3 MG/DL (ref 8.6–10.5)
CHLORIDE SERPL-SCNC: 103 MMOL/L (ref 98–107)
CO2 SERPL-SCNC: 27.3 MMOL/L (ref 22–29)
CREAT SERPL-MCNC: 2.58 MG/DL (ref 0.57–1)
DEPRECATED RDW RBC AUTO: 46.1 FL (ref 37–54)
ERYTHROCYTE [DISTWIDTH] IN BLOOD BY AUTOMATED COUNT: 14.4 % (ref 12.3–15.4)
GFR SERPL CREATININE-BSD FRML MDRD: 19 ML/MIN/1.73
GLUCOSE BLDC GLUCOMTR-MCNC: 131 MG/DL (ref 70–130)
GLUCOSE BLDC GLUCOMTR-MCNC: 228 MG/DL (ref 70–130)
GLUCOSE BLDC GLUCOMTR-MCNC: 253 MG/DL (ref 70–130)
GLUCOSE BLDC GLUCOMTR-MCNC: 311 MG/DL (ref 70–130)
GLUCOSE SERPL-MCNC: 244 MG/DL (ref 65–99)
HCT VFR BLD AUTO: 24.5 % (ref 34–46.6)
HGB BLD-MCNC: 7.7 G/DL (ref 12–15.9)
MCH RBC QN AUTO: 28.1 PG (ref 26.6–33)
MCHC RBC AUTO-ENTMCNC: 31.4 G/DL (ref 31.5–35.7)
MCV RBC AUTO: 89.4 FL (ref 79–97)
PHOSPHATE SERPL-MCNC: 3.1 MG/DL (ref 2.5–4.5)
PLATELET # BLD AUTO: 218 10*3/MM3 (ref 140–450)
PMV BLD AUTO: 11.3 FL (ref 6–12)
POTASSIUM SERPL-SCNC: 4 MMOL/L (ref 3.5–5.2)
RBC # BLD AUTO: 2.74 10*6/MM3 (ref 3.77–5.28)
SODIUM SERPL-SCNC: 138 MMOL/L (ref 136–145)
WBC NRBC COR # BLD: 11.49 10*3/MM3 (ref 3.4–10.8)

## 2021-12-15 PROCEDURE — 80069 RENAL FUNCTION PANEL: CPT | Performed by: STUDENT IN AN ORGANIZED HEALTH CARE EDUCATION/TRAINING PROGRAM

## 2021-12-15 PROCEDURE — 82962 GLUCOSE BLOOD TEST: CPT

## 2021-12-15 PROCEDURE — 97535 SELF CARE MNGMENT TRAINING: CPT

## 2021-12-15 PROCEDURE — 63710000001 INSULIN GLARGINE PER 5 UNITS: Performed by: INTERNAL MEDICINE

## 2021-12-15 PROCEDURE — 25010000002 HEPARIN (PORCINE) PER 1000 UNITS: Performed by: INTERNAL MEDICINE

## 2021-12-15 PROCEDURE — 63710000001 INSULIN LISPRO (HUMAN) PER 5 UNITS: Performed by: INTERNAL MEDICINE

## 2021-12-15 PROCEDURE — 63710000001 INSULIN LISPRO (HUMAN) PER 5 UNITS: Performed by: STUDENT IN AN ORGANIZED HEALTH CARE EDUCATION/TRAINING PROGRAM

## 2021-12-15 PROCEDURE — 85027 COMPLETE CBC AUTOMATED: CPT | Performed by: STUDENT IN AN ORGANIZED HEALTH CARE EDUCATION/TRAINING PROGRAM

## 2021-12-15 PROCEDURE — 97530 THERAPEUTIC ACTIVITIES: CPT

## 2021-12-15 RX ORDER — TORSEMIDE 20 MG/1
60 TABLET ORAL DAILY
Status: DISCONTINUED | OUTPATIENT
Start: 2021-12-15 | End: 2021-12-17

## 2021-12-15 RX ADMIN — LEVOTHYROXINE SODIUM 125 MCG: 0.12 TABLET ORAL at 05:05

## 2021-12-15 RX ADMIN — Medication 5 MG: at 20:53

## 2021-12-15 RX ADMIN — HYDROCODONE BITARTRATE AND ACETAMINOPHEN 1 TABLET: 5; 325 TABLET ORAL at 23:36

## 2021-12-15 RX ADMIN — ASPIRIN 81 MG: 81 TABLET, CHEWABLE ORAL at 09:15

## 2021-12-15 RX ADMIN — SODIUM CHLORIDE, PRESERVATIVE FREE 10 ML: 5 INJECTION INTRAVENOUS at 20:53

## 2021-12-15 RX ADMIN — INSULIN LISPRO 2 UNITS: 100 INJECTION, SOLUTION INTRAVENOUS; SUBCUTANEOUS at 12:30

## 2021-12-15 RX ADMIN — OLANZAPINE 5 MG: 5 TABLET ORAL at 20:53

## 2021-12-15 RX ADMIN — Medication 250 MG: at 09:15

## 2021-12-15 RX ADMIN — METOPROLOL TARTRATE 25 MG: 25 TABLET, FILM COATED ORAL at 09:15

## 2021-12-15 RX ADMIN — AMLODIPINE BESYLATE 5 MG: 5 TABLET ORAL at 09:15

## 2021-12-15 RX ADMIN — NYSTATIN 500000 UNITS: 100000 SUSPENSION ORAL at 17:01

## 2021-12-15 RX ADMIN — TAMSULOSIN HYDROCHLORIDE 0.4 MG: 0.4 CAPSULE ORAL at 09:15

## 2021-12-15 RX ADMIN — HYDROCODONE BITARTRATE AND ACETAMINOPHEN 1 TABLET: 5; 325 TABLET ORAL at 17:01

## 2021-12-15 RX ADMIN — HEPARIN SODIUM 5000 UNITS: 5000 INJECTION INTRAVENOUS; SUBCUTANEOUS at 05:05

## 2021-12-15 RX ADMIN — INSULIN GLARGINE 5 UNITS: 100 INJECTION, SOLUTION SUBCUTANEOUS at 06:28

## 2021-12-15 RX ADMIN — NYSTATIN 500000 UNITS: 100000 SUSPENSION ORAL at 09:15

## 2021-12-15 RX ADMIN — METOPROLOL TARTRATE 25 MG: 25 TABLET, FILM COATED ORAL at 20:52

## 2021-12-15 RX ADMIN — OLANZAPINE 5 MG: 5 TABLET ORAL at 09:15

## 2021-12-15 RX ADMIN — SODIUM CHLORIDE, PRESERVATIVE FREE 10 ML: 5 INJECTION INTRAVENOUS at 09:17

## 2021-12-15 RX ADMIN — TORSEMIDE 60 MG: 20 TABLET ORAL at 12:30

## 2021-12-15 RX ADMIN — HEPARIN SODIUM 5000 UNITS: 5000 INJECTION INTRAVENOUS; SUBCUTANEOUS at 21:01

## 2021-12-15 RX ADMIN — LINAGLIPTIN 5 MG: 5 TABLET, FILM COATED ORAL at 09:15

## 2021-12-15 RX ADMIN — PANTOPRAZOLE SODIUM 40 MG: 40 TABLET, DELAYED RELEASE ORAL at 09:15

## 2021-12-15 RX ADMIN — INSULIN LISPRO 3 UNITS: 100 INJECTION, SOLUTION INTRAVENOUS; SUBCUTANEOUS at 09:16

## 2021-12-15 RX ADMIN — INSULIN LISPRO 2 UNITS: 100 INJECTION, SOLUTION INTRAVENOUS; SUBCUTANEOUS at 09:14

## 2021-12-15 RX ADMIN — MULTIPLE VITAMINS W/ MINERALS TAB 1 TABLET: TAB at 09:15

## 2021-12-15 RX ADMIN — NYSTATIN 500000 UNITS: 100000 SUSPENSION ORAL at 12:30

## 2021-12-15 RX ADMIN — INSULIN LISPRO 5 UNITS: 100 INJECTION, SOLUTION INTRAVENOUS; SUBCUTANEOUS at 12:29

## 2021-12-15 RX ADMIN — FOLIC ACID 1 MG: 1 TABLET ORAL at 09:15

## 2021-12-15 RX ADMIN — HEPARIN SODIUM 5000 UNITS: 5000 INJECTION INTRAVENOUS; SUBCUTANEOUS at 14:02

## 2021-12-15 RX ADMIN — ROPINIROLE HYDROCHLORIDE 0.75 MG: 0.5 TABLET, FILM COATED ORAL at 20:53

## 2021-12-15 RX ADMIN — INSULIN LISPRO 2 UNITS: 100 INJECTION, SOLUTION INTRAVENOUS; SUBCUTANEOUS at 17:01

## 2021-12-15 NOTE — PLAN OF CARE
Goal Outcome Evaluation:  Plan of Care Reviewed With: patient        Progress: improving  Outcome Summary: Patient agreeable to therapy, c/o fatigue. Patient ambulated further distance today, c/o feeling very weak but able to tolerate increased ambulation distance. Anticipate patient will need SNU at d/c.  Patient was intermittently wearing a face mask during this therapy encounter. Therapist used appropriate personal protective equipment including eye protection, mask, and gloves.  Mask used was standard procedure mask. Appropriate PPE was worn during the entire therapy session. Hand hygiene was completed before and after therapy session. Patient is not in enhanced droplet precautions.

## 2021-12-15 NOTE — THERAPY TREATMENT NOTE
Patient Name: Zaina Martinez  : 1965    MRN: [id number]                              Today's Date: 12/15/2021       Admit Date: 2021    Visit Dx:     ICD-10-CM ICD-9-CM   1. Acute renal failure, unspecified acute renal failure type (HCC)  N17.9 584.9   2. Uncontrolled hypertension  I10 401.9   3. Anemia, unspecified type  D64.9 285.9   4. Elevated troponin  R77.8 790.6   5. Impaired mobility and activities of daily living  Z74.09 V49.89    Z78.9      Patient Active Problem List   Diagnosis   • MAYUR (acute kidney injury) (HCC)   • HTN (hypertension)   • Hypothyroidism   • Type 2 diabetes mellitus with hyperglycemia, with long-term current use of insulin (HCC)   • Rheumatoid arthritis (HCC)   • Angioedema   • GERD (gastroesophageal reflux disease)   • Anemia   • Medically noncompliant   • Myocardial infarction due to demand ischemia (HCC)   • Enteritis   • PRES (posterior reversible encephalopathy syndrome)   • Urine retention   • Klebsiella infection   • Superficial thrombophlebitis     Past Medical History:   Diagnosis Date   • Diabetes (HCC)    • Disease of thyroid gland    • GERD (gastroesophageal reflux disease)    • Hypertension    • Rheumatoid arthritis (HCC)      Past Surgical History:   Procedure Laterality Date   • EYE SURGERY     • HYSTERECTOMY     • INSERTION HEMODIALYSIS CATHETER N/A 2021    Procedure: HEMODIALYSIS CATHETER INSERTION;  Surgeon: Keli Salazar MD;  Location: [facility] ;  Service: Vascular;  Laterality: N/A;      General Information     Row Name 12/15/21 1626          Physical Therapy Time and Intention    Document Type therapy note (daily note)  -EM     Mode of Treatment individual therapy; physical therapy  -EM     Row Name 12/15/21 1626          General Information    Existing Precautions/Restrictions fall  -EM           User Key  (r) = Recorded By, (t) = Taken By, (c) = Cosigned By    Initials Name Provider Type    EM Lakeshia Reid PT Physical  Therapist               Mobility     Row Name 12/15/21 1626          Bed Mobility    Supine-Sit [place] (Bed Mobility) contact guard  -EM     Assistive Device (Bed Mobility) bed rails  -EM     Row Name 12/15/21 1626          Sit-Stand Transfer    Sit-Stand [place] (Transfers) contact guard  -EM     Assistive Device (Sit-Stand Transfers) walker, front-wheeled  -EM     Row Name 12/15/21 1626          Gait/Stairs (Locomotion)    [place]utton Level (Gait) minimum assist (75% patient effort); contact guard  -EM     Assistive Device (Gait) walker, front-wheeled  -EM     Distance in Feet (Gait) 50 feet, seated rest and then 10 feet around bed to chair  -EM     Deviations/Abnormal Patterns (Gait) stride length decreased; gait speed decreased  -EM           User Key  (r) = Recorded By, (t) = Taken By, (c) = Cosigned By    Initials Name Provider Type    Lakeshia Hamilton PT Physical Therapist               Obj/Interventions    No documentation.                Goals/Plan    No documentation.                Clinical Impression     Row Name 12/15/21 1627          Pain Scale: Numbers Pre/Post-Treatment    Pretreatment Pain Rating 0/10 - no pain  -EM     Row Name 12/15/21 1627          Plan of Care Review    Plan of Care Reviewed With patient  -EM     Progress improving  -EM     Outcome Summary Patient agreeable to therapy, c/o fatigue. Patient ambulated further distance today, c/o feeling very weak but able to tolerate increased ambulation distance. Anticipate patient will need SNU at d/c.  -EM     Row Name 12/15/21 1627          Positioning and Restraints    Pre-Treatment Position in bed  -EM     Post Treatment Position chair  -EM     In Chair reclined; call light within reach; exit alarm on  -EM           User Key  (r) = Recorded By, (t) = Taken By, (c) = Cosigned By    Initials Name Provider Type    Lakeshia Hamilton PT Physical Therapist               Outcome Measures     Row Name 12/15/21 9228           How much help from another person do you currently need...    Turning from your back to your side while in flat bed without using bedrails? 3  -EM     Moving from lying on back to sitting on the side of a flat bed without bedrails? 3  -EM     Moving to and from a bed to a chair (including a wheelchair)? 3  -EM     Standing up from a chair using your arms (e.g., wheelchair, bedside chair)? 3  -EM     Climbing 3-5 steps with a railing? 2  -EM     To walk in hospital room? 3  -EM     AM-PAC 6 Clicks Score (PT) 17  -EM           User Key  (r) = Recorded By, (t) = Taken By, (c) = Cosigned By    Initials Name Provider Type     Lakeshia Reid PT Physical Therapist                             Physical Therapy Education                 Title: PT OT SLP Therapies (In Progress)     Topic: Physical Therapy (In Progress)     Point: Mobility training (In Progress)     Learning Progress Summary           Patient Acceptance, E, VU by EM at 12/15/2021 1630   Family Acceptance, E,D, NR by JERZY at 12/13/2021 1512      Show all documentation for this point (3)                 Point: Home exercise program (In Progress)     Learning Progress Summary           Patient Acceptance, E, NR by AR at 12/14/2021 1016   Family Acceptance, E,D, NR by JERZY at 12/13/2021 1512      Show all documentation for this point (2)                 Point: Body mechanics (In Progress)     Learning Progress Summary           Patient Acceptance, E, NR by AR at 12/14/2021 1016   Family Acceptance, E,D, NR by JERZY at 12/13/2021 1512      Show all documentation for this point (2)                 Point: Precautions (In Progress)     Learning Progress Summary           Patient Acceptance, E, NR by AR at 12/14/2021 1016   Family Acceptance, E,D, NR by JERZY at 12/13/2021 1512      Show all documentation for this point (2)                             User Key     Initials Effective Dates Name Provider Type [facility] 06/16/21 -  Lakeshia Reid PT  Physical Therapist PT    AR 06/16/21 -  Nell Aragon, PT Physical Therapist PT    JERZY 10/25/21 -  Osmani Aguayo PT Student PT Student PT              PT Recommendation and Plan     Plan of Care Reviewed With: patient  Progress: improving  Outcome Summary: Patient agreeable to therapy, c/o fatigue. Patient ambulated further distance today, c/o feeling very weak but able to tolerate increased ambulation distance. Anticipate patient will need SNU at d/c.     Time Calculation:    PT Charges     Row Name 12/15/21 1630             Time Calculation    Start Time 1523  -EM      Stop Time 1538  -EM      Time Calculation (min) 15 min  -EM      PT Received On 12/15/21  -EM      PT - Next Appointment 12/16/21  -EM              Time Calculation- PT    Total Timed Code Minutes- PT 15 minute(s)  -EM              Timed Charges    17029 - PT Therapeutic Activity Minutes 15  -EM              Total Minutes    Timed Charges Total Minutes 15  -EM       Total Minutes 15  -EM            User Key  (r) = Recorded By, (t) = Taken By, (c) = Cosigned By    Initials Name Provider Type    EM Lakeshia Reid PT Physical Therapist              Therapy Charges for Today     Code Description Service Date Service Provider Modifiers Qty    [id number] HC PT THERAPEUTIC ACT EA 15 MIN 12/15/2021 Lakeshia Reid PT GP 1          PT G-Codes  Outcome Measure Options: AM-PAC 6 Clicks Basic Mobility (PT)  AM-PAC 6 Clicks Score (PT): 17  AM-PAC 6 Clicks Score (OT): 18  Modified Iza Scale: 4 - Moderately severe disability.  Unable to walk without assistance, and unable to attend to own bodily needs without assistance.    Lakeshia Reid PT  12/15/2021

## 2021-12-15 NOTE — CASE MANAGEMENT/SOCIAL WORK
Continued Stay Note  [facility]     Patient Name: Zaina Martinez  MRN: [id number]  Today's Date: 12/15/2021    Admit Date: 11/29/2021     Discharge Plan     Row Name 12/15/21 1521       Plan    Plan SNF with HD setup, Per Vivian/Pricila Perez, information has been sent to [facility] to see if they will have HD chair for pt at [facility] or [facility]. Needs [facility] pre cert.    Patient/Family in Agreement with Plan yes    Plan Comments Called to Vivian/[facility] ([phone]) to follow up on acceptance to [facility] or [facility] with HD chair setup. Vivian stated that she has submitted for HD chair with [facility] and waiting for them to confirm they would have a HD chair available. Pt needs [employer] pre cert and was hopeful to start pre cert today but currently do not have an accepting facility. Will follow up in AM. Francisco J Patterson RN-BC               Discharge Codes    No documentation.               Expected Discharge Date and Time     Expected Discharge Date Expected Discharge Time    Dec 17, 2021             Francisco J Patterson RN

## 2021-12-15 NOTE — THERAPY TREATMENT NOTE
Patient Name: Zaina Martinez  : 1965    MRN: [id number]                              Today's Date: 12/15/2021       Admit Date: 2021    Visit Dx:     ICD-10-CM ICD-9-CM   1. Acute renal failure, unspecified acute renal failure type (HCC)  N17.9 584.9   2. Uncontrolled hypertension  I10 401.9   3. Anemia, unspecified type  D64.9 285.9   4. Elevated troponin  R77.8 790.6   5. Impaired mobility and activities of daily living  Z74.09 V49.89    Z78.9      Patient Active Problem List   Diagnosis   • MAYUR (acute kidney injury) (HCC)   • HTN (hypertension)   • Hypothyroidism   • Type 2 diabetes mellitus with hyperglycemia, with long-term current use of insulin (HCC)   • Rheumatoid arthritis (HCC)   • Angioedema   • GERD (gastroesophageal reflux disease)   • Anemia   • Medically noncompliant   • Myocardial infarction due to demand ischemia (HCC)   • Enteritis   • PRES (posterior reversible encephalopathy syndrome)   • Urine retention   • Klebsiella infection   • Superficial thrombophlebitis     Past Medical History:   Diagnosis Date   • Diabetes (HCC)    • Disease of thyroid gland    • GERD (gastroesophageal reflux disease)    • Hypertension    • Rheumatoid arthritis (HCC)      Past Surgical History:   Procedure Laterality Date   • EYE SURGERY     • HYSTERECTOMY     • INSERTION HEMODIALYSIS CATHETER N/A 2021    Procedure: HEMODIALYSIS CATHETER INSERTION;  Surgeon: Keli Salazar MD;  Location: [facility] ;  Service: Vascular;  Laterality: N/A;      General Information     Row Name 12/15/21 1341          OT Time and Intention    Document Type therapy note (daily note)  -JW     Mode of Treatment occupational therapy; individual therapy  -JW     Row Name 12/15/21 1341          General Information    Patient Profile Reviewed yes  -JW     Existing Precautions/Restrictions fall  -JW     Row Name 12/15/21 1341          Cognition    Orientation Status (Cognition) oriented x 3  -JW     Row Name  12/15/21 1341          Safety Issues, Functional Mobility    Impairments Affecting Function (Mobility) balance; coordination; endurance/activity tolerance; strength; postural/trunk control; shortness of breath  -           User Key  (r) = Recorded By, (t) = Taken By, (c) = Cosigned By    Initials Name Provider Type     Fany Simms OT Occupational Therapist                 Mobility/ADL's     Row Name 12/15/21 1341          Bed Mobility    Bed Mobility supine-sit  -     Supine-Sit [place] (Bed Mobility) supervision  -     Sit-Supine [place] (Bed Mobility) supervision  -     Assistive Device (Bed Mobility) head of bed elevated  -     Row Name 12/15/21 1341          Transfers    Transfers sit-stand transfer; toilet transfer  -     Sit-Stand [place] (Transfers) contact guard  -     [place] Level (Toilet Transfer) contact guard; verbal cues  -     Assistive Device (Toilet Transfer) grab bars/safety frame; walker, front-wheeled  -[facility] Name 12/15/21 1341          Sit-Stand Transfer    Assistive Device (Sit-Stand Transfers) walker, front-wheeled  -[facility] Name 12/15/21 1341          Toilet Transfer    Type (Toilet Transfer) stand-sit; sit-stand  -[facility] Name 12/15/21 1341          Functional Mobility    Functional Mobility- Ind. Level contact guard assist  -     Functional Mobility- Device rolling walker  -     Functional Mobility- Comment fxl ambulation in room, in and out of bathroom  -     Row Name 12/15/21 1341          Activities of Daily Living    BADL Assessment/Intervention grooming; toileting  -[facility] Name 12/15/21 1341          Grooming Assessment/Training    [place] Level (Grooming) grooming skills; set up  -     Position (Grooming) sink side; unsupported standing  -[facility] Name 12/15/21 1341          Toileting Assessment/Training    [place] Level (Toileting) toileting skills; standby assist  -     Assistive Devices (Toileting) grab  bar/safety frame  -     Position (Toileting) unsupported standing; unsupported sitting  -           User Key  (r) = Recorded By, (t) = Taken By, (c) = Cosigned By    Initials Name Provider Type    Fany Tomas OT Occupational Therapist               Obj/Interventions     Row Name 12/15/21 1347          Balance    Balance Interventions occupation based/functional task; standing; static; dynamic; supported  -JW     Comment, Balance SBA-CGA for standing balance  -           User Key  (r) = Recorded By, (t) = Taken By, (c) = Cosigned By    Initials Name Provider Type    Fany Tomas OT Occupational Therapist               Goals/Plan    No documentation.                Clinical Impression     Row Name 12/15/21 1345          Pain Assessment    Additional Documentation Pain Scale: Numbers Pre/Post-Treatment (Group)  -[facility] Name 12/15/21 1340          Pain Scale: Numbers Pre/Post-Treatment    Pretreatment Pain Rating 0/10 - no pain  -     Posttreatment Pain Rating 0/10 - no pain  -     Row Name 12/15/21 1340          Plan of Care Review    Plan of Care Reviewed With patient  -     Progress improving  -     Outcome Summary Pt was found supine in bed,pleasant and agreeable to OOB ax. She sits EOB with SPV, STS and fxl ambulation to sink with CGA using RW. Pt stands ~3 mins for oral care and grooming tasks at sinkside. Toilet TF for CGA when standing from low surface, SPV for toileting tasks. Pt ambulates back to bedside and returns to supine in bed, states she feels really good today.  -     Row Name 12/15/21 1344          Positioning and Restraints    Pre-Treatment Position in bed  -     Post Treatment Position bed  -JW     In Bed call light within reach; encouraged to call for assist; exit alarm on; fowlers  -           User Key  (r) = Recorded By, (t) = Taken By, (c) = Cosigned By    Initials Name Provider Type    Fany Tomas OT Occupational Therapist               Outcome Measures      Row Name 12/15/21 3652          How much help from another is currently needed...    Putting on and taking off regular lower body clothing? 3  -JW     Bathing (including washing, rinsing, and drying) 3  -JW     Toileting (which includes using toilet bed pan or urinal) 3  -JW     Putting on and taking off regular upper body clothing 3  -JW     Taking care of personal grooming (such as brushing teeth) 3  -JW     Eating meals 3  -JW     AM-PAC 6 Clicks Score (OT) 18  -JW           User Key  (r) = Recorded By, (t) = Taken By, (c) = Cosigned By    Initials Name Provider Type    Fany Tomas OT Occupational Therapist                Occupational Therapy Education                 Title: PT OT SLP Therapies (In Progress)     Topic: Occupational Therapy (Not Started)     Point: ADL training (Not Started)     Description:   Instruct learner(s) on proper safety adaptation and remediation techniques during self care or transfers.   Instruct in proper use of assistive devices.              Learner Progress:  Not documented in this visit.          Point: Home exercise program (Not Started)     Description:   Instruct learner(s) on appropriate technique for monitoring, assisting and/or progressing therapeutic exercises/activities.              Learner Progress:  Not documented in this visit.          Point: Precautions (Not Started)     Description:   Instruct learner(s) on prescribed precautions during self-care and functional transfers.              Learner Progress:  Not documented in this visit.          Point: Body mechanics (Not Started)     Description:   Instruct learner(s) on proper positioning and spine alignment during self-care, functional mobility activities and/or exercises.              Learner Progress:  Not documented in this visit.                          OT Recommendation and Plan     Plan of Care Review  Plan of Care Reviewed With: patient  Progress: improving  Outcome Summary: Pt was found supine in  bed,pleasant and agreeable to OOB ax. She sits EOB with SPV, STS and fxl ambulation to sink with CGA using RW. Pt stands ~3 mins for oral care and grooming tasks at sinkside. Toilet TF for CGA when standing from low surface, SPV for toileting tasks. Pt ambulates back to bedside and returns to supine in bed, states she feels really good today.     Time Calculation:    Time Calculation- OT     Row Name 12/15/21 1348             Time Calculation- OT    OT Start Time 1318  -JW      OT Stop Time 1341  -JW      OT Time Calculation (min) 23 min  -      Total Timed Code Minutes- OT 23 minute(s)  -      OT Received On 12/15/21  -      OT - Next Appointment 12/16/21  -              Timed Charges    62704 - OT Self Care/Mgmt Minutes 23  -              Total Minutes    Timed Charges Total Minutes 23  -       Total Minutes 23  -            User Key  (r) = Recorded By, (t) = Taken By, (c) = Cosigned By    Initials Name Provider Type     Fany Simms OT Occupational Therapist              Therapy Charges for Today     Code Description Service Date Service Provider Modifiers Qty    [id number]  OT SELF CARE/MGMT/TRAIN EA 15 MIN 12/15/2021 Fany Simms OT GO 2               Fany Simms OT  12/15/2021

## 2021-12-15 NOTE — PROGRESS NOTES
"   LOS: 16 days    Patient Care Team:  Provider, No Known as PCP - General    Chief Complaint:    Chief Complaint   Patient presents with   • Altered Mental Status     Follow up MAYUR CKD4  Subjective     Interval History:   Follow up MAYUR on CKD IV.   cc. 2 L off with dialysis yesterday. Still on flomax for urinary retention earlier in admission .  Bowels moved. Eating well.  Still with some low back pain.   Objective     Vital Signs  Temp:  [98 °F (36.7 °C)-98.5 °F (36.9 °C)] 98.5 °F (36.9 °C)  Heart Rate:  [60-90] 77  Resp:  [16-18] 18  BP: (112-193)/(47-95) 158/83    Flowsheet Rows      First Filed Value   Admission Height 157.5 cm (62\") Documented at 11/29/2021 2011   Admission Weight 59 kg (130 lb) Documented at 11/30/2021 0200          No intake/output data recorded.  I/O last 3 completed shifts:  In: 1300 [P.O.:1200; IV Piggyback:100]  Out: 2200 [Urine:200; Other:2000]    Intake/Output Summary (Last 24 hours) at 12/15/2021 0858  Last data filed at 12/15/2021 0527  Gross per 24 hour   Intake 670 ml   Output 2200 ml   Net -1530 ml       Physical Exam:  General Appearance: Chronically ill. Up in chair.  Looks stronger.   Neck no jvd  Vascular: Right chest TDC  Heart RRR No s3 or rub.   Lungs clear to auscultation, no wheezing.   Abd + bs, soft nontender.   Ext 1+ lower ext edema.    Neuro awake, oriented. Moves all 4 ext.                  Results Review:    Results from last 7 days   Lab Units 12/15/21  0657 12/14/21  0752 12/13/21  0627   SODIUM mmol/L 138 138 138   POTASSIUM mmol/L 4.0 4.3 4.5   CHLORIDE mmol/L 103 103 106   CO2 mmol/L 27.3 24.8 26.1   BUN mg/dL 25* 41* 31*   CREATININE mg/dL 2.58* 3.48* 3.22*   CALCIUM mg/dL 7.3* 7.5* 7.4*   GLUCOSE mg/dL 244* 156* 136*       Estimated Creatinine Clearance: 20.9 mL/min (A) (by C-G formula based on SCr of 2.58 mg/dL (H)).    Results from last 7 days   Lab Units 12/15/21  0657 12/14/21  0752 12/13/21  0627   PHOSPHORUS mg/dL 3.1 3.1 2.7             Results " from last 7 days   Lab Units 12/15/21  0657 12/14/21  0752 12/13/21  1229 12/13/21  0627 12/12/21  0602 12/11/21  0511 12/11/21  0511   WBC 10*3/mm3 11.49* 10.64  --  9.59 9.73  --  12.43*   HEMOGLOBIN g/dL 7.7* 7.3* 8.0* 6.7* 7.5*   < > 7.7*   PLATELETS 10*3/mm3 218 231  --  202 233  --  230    < > = values in this interval not displayed.               Imaging Results (Last 24 Hours)     ** No results found for the last 24 hours. **        amLODIPine, 5 mg, Oral, Q24H  aspirin, 81 mg, Oral, Daily  folic acid, 1 mg, Oral, Daily  heparin (porcine), 5,000 Units, Subcutaneous, Q8H  insulin glargine, 5 Units, Subcutaneous, QAM  insulin lispro, 0-7 Units, Subcutaneous, TID AC  insulin lispro, 2 Units, Subcutaneous, TID With Meals  levothyroxine, 125 mcg, Oral, Q AM  linagliptin, 5 mg, Oral, Daily  melatonin, 5 mg, Oral, Nightly  metoprolol tartrate, 25 mg, Oral, Q12H  multivitamin with minerals, 1 tablet, Oral, Daily  nystatin, 5 mL, Swish & Swallow, 4x Daily  OLANZapine, 5 mg, Oral, BID  pantoprazole, 40 mg, Oral, Daily  rOPINIRole, 0.75 mg, Oral, Nightly  sodium chloride, 10 mL, Intravenous, Q12H  tamsulosin, 0.4 mg, Oral, Daily  thiamine, 250 mg, Oral, Daily      sodium chloride, 9 mL/hr, Last Rate: 9 mL/hr (12/06/21 1210)        Medication Review:   Current Facility-Administered Medications   Medication Dose Route Frequency Provider Last Rate Last Admin   • acetaminophen (TYLENOL) tablet 650 mg  650 mg Oral Q4H PRN Keli Salazar MD   650 mg at 12/13/21 2012   • amLODIPine (NORVASC) tablet 5 mg  5 mg Oral Q24H Albert Templeton MD       • aspirin chewable tablet 81 mg  81 mg Oral Daily Keli Salazar MD   81 mg at 12/14/21 1444   • dextrose (D50W) (25 g/50 mL) IV injection 25 g  25 g Intravenous Q15 Min PRN Keli Salazar MD   25 g at 12/08/21 1223   • dextrose (GLUTOSE) oral gel 15 g  15 g Oral Q15 Min PRN Keli Salazar MD   15 g at 12/08/21 1502   • folic acid (FOLVITE) tablet 1 mg  1 mg  Oral Daily Keli Salazar MD   1 mg at 12/14/21 1444   • glucagon (human recombinant) (GLUCAGEN DIAGNOSTIC) injection 1 mg  1 mg Subcutaneous PRN Keli Salazar MD       • heparin (porcine) 5000 UNIT/ML injection 5,000 Units  5,000 Units Subcutaneous Q8H Osmar Alva MD   5,000 Units at 12/15/21 0505   • heparin (porcine) injection 3,800 Units  3,800 Units Intracatheter PRN Alejo Lange MD   3,800 Units at 12/14/21 1118   • HYDROcodone-acetaminophen (NORCO) 5-325 MG per tablet 1 tablet  1 tablet Oral Q6H PRN Osmar Alva MD   1 tablet at 12/14/21 2131   • insulin glargine (LANTUS, SEMGLEE) injection 5 Units  5 Units Subcutaneous QAM Osmar Alva MD   5 Units at 12/15/21 0628   • insulin lispro (ADMELOG) injection 0-7 Units  0-7 Units Subcutaneous TID AC Keli Salazar MD   2 Units at 12/14/21 1746   • insulin lispro (ADMELOG) injection 2 Units  2 Units Subcutaneous TID With Meals Osmar Alva MD   2 Units at 12/14/21 1747   • labetalol (NORMODYNE,TRANDATE) injection 10 mg  10 mg Intravenous Q6H PRN Keli Salazar MD   10 mg at 12/08/21 1246   • levothyroxine (SYNTHROID, LEVOTHROID) tablet 125 mcg  125 mcg Oral Q AM Osmar Alva MD   125 mcg at 12/15/21 0505   • linagliptin (TRADJENTA) tablet 5 mg  5 mg Oral Daily Keli Salazar MD   5 mg at 12/14/21 1444   • loperamide (IMODIUM) capsule 2 mg  2 mg Oral 4x Daily PRN Keli Salazar MD   2 mg at 12/13/21 1455   • melatonin tablet 5 mg  5 mg Oral Nightly Osmar Alva MD   5 mg at 12/14/21 2020   • metoprolol tartrate (LOPRESSOR) tablet 25 mg  25 mg Oral Q12H Keli Salazar MD   25 mg at 12/14/21 2020   • multivitamin with minerals 1 tablet  1 tablet Oral Daily Osmar Alva MD   1 tablet at 12/14/21 1444   • nitroglycerin (NITROSTAT) SL tablet 0.4 mg  0.4 mg Sublingual Q5 Min PRN Keli Salazar MD       • nystatin (MYCOSTATIN)  100,000 unit/mL suspension 500,000 Units  5 mL Swish & Swallow 4x Daily Osmar Alva MD   500,000 Units at 12/14/21 2020   • OLANZapine (zyPREXA) injection 5 mg  5 mg Intramuscular Q8H PRN Yordan Suero III, MD   5 mg at 12/10/21 1851   • OLANZapine (zyPREXA) tablet 5 mg  5 mg Oral BID Osmar Alva MD   5 mg at 12/14/21 2020   • pantoprazole (PROTONIX) EC tablet 40 mg  40 mg Oral Daily Keli Salazar MD   40 mg at 12/14/21 1446   • prochlorperazine (COMPAZINE) injection 5 mg  5 mg Intravenous Q6H PRN Keli Salazar MD       • rOPINIRole (REQUIP) tablet 0.75 mg  0.75 mg Oral Nightly Keli Salazar MD   0.75 mg at 12/14/21 2019   • sodium chloride 0.9 % flush 10 mL  10 mL Intravenous Q12H Keli Salazar MD   10 mL at 12/14/21 2021   • sodium chloride 0.9 % flush 10 mL  10 mL Intravenous PRN Keli Salazar MD       • sodium chloride 0.9 % infusion  9 mL/hr Intravenous Continuous PRN Keli Salazar MD 9 mL/hr at 12/06/21 1210 9 mL/hr at 12/06/21 1210   • tamsulosin (FLOMAX) 24 hr capsule 0.4 mg  0.4 mg Oral Daily Osmar Alva MD   0.4 mg at 12/14/21 1444   • thiamine (VITAMIN B-1) tablet 250 mg  250 mg Oral Daily Osmar Alva MD   250 mg at 12/14/21 1443       Assessment/Plan   1.  MAYUR on CKD stage 4, now dialysis-dependent.  Likely new ESRD.  Outpt spot being arranged .  2.  Confusion.  Multifactorial secondary possible to metabolic encephalopathy in addition to PRES. Improving.   3.  DM2 with poor control. On Tradjenta.   4.  Anemia secondary to iron deficiency anemia/CKD.  Ferritin was 100 and iron saturation 21. Ferrlecit dose 2 with HD tomorrow.  5.  Hypertension. Norvasc increased yesterday.   6.  Type II non-NSTEMI.  7.  Severe hypothyroidism.  On replacement.  8.  Medication noncompliance.  9.  Diarrhea, resolved       Plan  1.  Dialysis tomorrow.   2. Start diuretic today .      Ruth Lira,  MD  12/15/21  08:58 EST

## 2021-12-15 NOTE — PROGRESS NOTES
The patient's encephalopathy appears to have entirely resolved.  She is pleasant cooperative and fully oriented during today's interview.  I will sign off but I am available if needed further.

## 2021-12-15 NOTE — PROGRESS NOTES
Name: Zaina Martinez ADMIT: 2021   : 1965  PCP: Provider, No Known    MRN: [id number] LOS: 16 days   AGE/SEX: 56 y.o. female  ROOM: [facility]     Subjective   Subjective   Patient seen this morning.  No acute events overnight.  Sitting up in the bed.  Not in distress.  Complains of mild back pain this morning from laying in bed.  Overall feels good.  Blood pressure was elevated to yesterday with systolic up to 190s, amlodipine was increased to 5 mg daily.  Blood pressure improved today.        Review of Systems   As above  Objective   Objective   Vital Signs  /84, RR 16  SpO2:  [90 %-98 %] 95 %  on  Flow (L/min):  [2] 2;   Device (Oxygen Therapy): room air  Body mass index is 24.69 kg/m².  Physical Exam  Constitutional:Awake, alert  HENT: NCAT, mucous membranes moist, neck supple  Respiratory: Clear to auscultation bilaterally, respiratory effort normal, nonlabored breathing   Cardiovascular: RRR, normal radial pulses  Gastrointestinal: Positive bowel sounds, soft, nontender, nondistended  Musculoskeletal: Normal musculature for age, minimal lower extremity edema, BMI 25  Neurologic:  Oriented,  Moving all extremities,follows commands  Skin: No rashes or jaundice, warm     Results Review     I reviewed the patient's new clinical results.  Results from last 7 days   Lab Units 12/15/21  0657 21  07521  1229 21  0621  0621  06   WBC 10*3/mm3 11.49* 10.64  --  9.59  --  9.73   HEMOGLOBIN g/dL 7.7* 7.3* 8.0* 6.7*   < > 7.5*   PLATELETS 10*3/mm3 218 231  --  202  --  233    < > = values in this interval not displayed.     Results from last 7 days   Lab Units 12/15/21  0657 21  0752 21  0621  06   SODIUM mmol/L 138 138 138 137   POTASSIUM mmol/L 4.0 4.3 4.5 4.2   CHLORIDE mmol/L 103 103 106 104   CO2 mmol/L 27.3 24.8 26.1 24.4   BUN mg/dL 25* 41* 31* 19   CREATININE mg/dL 2.58* 3.48* 3.22* 2.34*   GLUCOSE mg/dL 244* 156* 136* 334*   Estimated  Creatinine Clearance: 20.9 mL/min (A) (by C-G formula based on SCr of 2.58 mg/dL (H)).  Results from last 7 days   Lab Units 12/15/21  0657 12/14/21  0752 12/13/21  0627 12/12/21  0602   ALBUMIN g/dL 2.00* 1.80* 1.60* 1.60*     Results from last 7 days   Lab Units 12/15/21  0657 12/14/21  0752 12/13/21  0627 12/12/21  0602   CALCIUM mg/dL 7.3* 7.5* 7.4* 7.6*   ALBUMIN g/dL 2.00* 1.80* 1.60* 1.60*   PHOSPHORUS mg/dL 3.1 3.1 2.7 2.5       COVID19   Date Value Ref Range Status   12/06/2021 Not Detected Not Detected - Ref. Range Final   11/29/2021 Not Detected Not Detected - Ref. Range Final     Glucose   Date/Time Value Ref Range Status   12/15/2021 1110 311 (H) 70 - 130 mg/dL Final     Comment:     Meter: [id number] : [id number] Jonn Azina NA   12/15/2021 0610 253 (H) 70 - 130 mg/dL Final     Comment:     Meter: [id number] : [id number] Mark Marlena NA   12/14/2021 2030 242 (H) 70 - 130 mg/dL Final     Comment:     Meter: [id number] : [id number] Mark Marlena NA   12/14/2021 1627 166 (H) 70 - 130 mg/dL Final     Comment:     Meter: [id number] : [id number] [place] NA   12/14/2021 0634 215 (H) 70 - 130 mg/dL Final     Comment:     Meter: [id number] : [id number] Mark Marlena NA   12/13/2021 2028 164 (H) 70 - 130 mg/dL Final     Comment:     Meter: [id number] : [id number] Mark Marlena NA   12/13/2021 1641 119 70 - 130 mg/dL Final     Comment:     Meter: [id number] : [id number] Jomar SAAVEDRA       XR Chest 1 View  PORTABLE CHEST 12/11/2021 AT 12:40 PM     CLINICAL HISTORY: Confusion. Leukocytosis. Intermittent cough.     Compared to a previous chest dated 12/06/2021.     Right internal jugular dialysis catheter remains in place in  satisfactory position without change. The lungs are better inflated than  on the previous chest x-ray. There remain free of focal infiltrates. A  tiny left pleural effusion is suspected. The heart is normal in size.  The pulmonary vasculature limits.     This  report was finalized on 12/11/2021 1:14 PM by Dr. Darien Barron M.D.       Scheduled Medications  amLODIPine, 5 mg, Oral, Q24H  aspirin, 81 mg, Oral, Daily  [START ON 12/16/2021] sodium ferric gluconate complex IVPB in 100 mL NS, 125 mg, Intravenous, Once in Dialysis  folic acid, 1 mg, Oral, Daily  heparin (porcine), 5,000 Units, Subcutaneous, Q8H  insulin glargine, 5 Units, Subcutaneous, QAM  insulin lispro, 0-7 Units, Subcutaneous, TID AC  insulin lispro, 2 Units, Subcutaneous, TID With Meals  levothyroxine, 125 mcg, Oral, Q AM  linagliptin, 5 mg, Oral, Daily  melatonin, 5 mg, Oral, Nightly  metoprolol tartrate, 25 mg, Oral, Q12H  multivitamin with minerals, 1 tablet, Oral, Daily  nystatin, 5 mL, Swish & Swallow, 4x Daily  OLANZapine, 5 mg, Oral, BID  pantoprazole, 40 mg, Oral, Daily  rOPINIRole, 0.75 mg, Oral, Nightly  sodium chloride, 10 mL, Intravenous, Q12H  tamsulosin, 0.4 mg, Oral, Daily  thiamine, 250 mg, Oral, Daily  torsemide, 60 mg, Oral, Daily    Infusions  sodium chloride, 9 mL/hr, Last Rate: 9 mL/hr (12/06/21 1210)    Diet  Diet Regular       Assessment/Plan     Active Hospital Problems    Diagnosis  POA   • **MAYUR (acute kidney injury) ([facility]) [N17.9]  Yes   • Superficial thrombophlebitis [I80.9]  Clinically Undetermined   • Urine retention [R33.9]  Yes   • Klebsiella infection [A49.8]  Yes   • PRES (posterior reversible encephalopathy syndrome) [I67.83]  Yes   • Enteritis [K52.9]  Clinically Undetermined   • Medically noncompliant [Z91.19]  Not Applicable   • Myocardial infarction due to demand ischemia ([facility]) [I21.A1]  Yes   • Angioedema [T78.3XXA]  Yes   • Anemia [D64.9]  Yes   • HTN (hypertension) [I10]  Yes   • Hypothyroidism [E03.9]  Yes   • Type 2 diabetes mellitus with hyperglycemia, with long-term current use of insulin (HCC) [E11.65, Z79.4]  Not Applicable   • Rheumatoid arthritis (HCC) [M06.9]  Yes      Resolved Hospital Problems    Diagnosis Date Resolved POA   • Hypertensive urgency  [I16.0] 11/30/2021 Yes       Zaina Martinez is a 56 y.o. female presents with acute kidney injury, enteritis, hypothyroidism, and acute confusion felt to be related to PRES.       Essential hypertension  On amlodipine 2.5 mg daily, metoprolol 25 mg every 12  increased amlodipine to 5 mg daily  12/14  On 10 mg  IV labetalol PRN for systolic above 170  -Continue to monitor and adjust as necessary.        Renal failure:  Now on dialysis, new this admission.      Psychosis, possible undiagnosed bipolar syndrome with acute psychotic manic phase:   feels patient has undiagnosed bipolar and has had previous episode of psychosis at her last hospitalization when she had COVID-19.  Plan for mood stabilizer, patient has required restraints.  -Psychosis now resolved.  -Zyprexa 5mg bid  -psychiatry following   -Improving     PRES, multifactorial metabolic encephalopathy:   PRES Diagnosed by neurology on MRI.  Working to gently regulate blood pressure.  On amlodipine 2.5 mg daily, metoprolol 25 mg every 12  increased amlodipine to 5 mg daily      Hypothyroidism:   -TSH is severely elevated at 152.      -Patient must be noncompliant with thyroid medicine.    -Synthroid adjusted.    -Recommend she have free T4 and TSH within 1 month to continue to monitor.    -Patient admits to missing her thyroid medication.  I explained to her that this is a very important medication.     Enteritis and UTI:  Abdominal symptoms resolved.    Finished  ceftriaxone     Diabetes: Monitor glucose and adjust as needed.  A1c 9.8.  Poorly controlled.  Started Tradjenta.  Correction insulin.  Glucose improved     Demand ischemia: Medical management.  Cardiology has evaluated.  Normal echocardiogram.  Needs outpatient follow-up with cardiology.  She agrees with this plan.     Severe anemia:  Status post transfusion.  Hemoccult negative.  No active bleeding reported.  Normocytic.  Mostly consistent with anemia of chronic disease or chronic kidney  disease.    B12 relatively normal.  Folic acid is borderline low.  Started Folic acid supplements.     Urine retention: Plan to trial Flomax.  Now better     Debility: PT.  Skilled facility when possible.     DVT Prophylaxis: Mechanical  Disposition: Skilled facility ,pending placement , medically ready to be discharged.    Albert Templeton MD  [facility]  12/15/21  11:29 EST

## 2021-12-15 NOTE — PLAN OF CARE
Goal Outcome Evaluation:  Plan of Care Reviewed With: patient    Progress: improving  Outcome Summary: Pt was found supine in bed,pleasant and agreeable to OOB ax. She sits EOB with SPV, STS and fxl ambulation to sink with CGA using RW. Pt stands ~3 mins for oral care and grooming tasks at sinkside. Toilet TF for CGA when standing from low surface, SPV for toileting tasks. Pt ambulates back to bedside and returns to supine in bed, states she feels really good today.    Patient was not wearing a face mask during this therapy encounter. Therapist used appropriate personal protective equipment including mask, gloves, and eye protection.   Hand hygiene was completed before and after therapy session.

## 2021-12-15 NOTE — PLAN OF CARE
Problem: Adult Inpatient Plan of Care  Goal: Plan of Care Review  Outcome: Ongoing, Progressing  Flowsheets (Taken 12/15/2021 8673)  Progress: improving  Plan of Care Reviewed With: patient  Outcome Summary: Vitals stable. Started on torsemide. Kpad ordered for chronic back pain. Dialysis scheduled for tomorrow with IV Iron ordered. Daily standing weights ordered. Psych signed off. Worked with PT. Will continue to monitor.

## 2021-12-15 NOTE — PLAN OF CARE
Problem: Adult Inpatient Plan of Care  Goal: Plan of Care Review  Outcome: Ongoing, Progressing  Flowsheets (Taken 12/15/2021 0511)  Progress: improving  Plan of Care Reviewed With: patient  Outcome Summary: VSS. Pt on 2L of O2 throughout night. PRN norco given for back pain. Pt AA&O x4. Pt stable and needs met at this time.

## 2021-12-16 LAB
ABO GROUP BLD: NORMAL
ALBUMIN SERPL-MCNC: 1.7 G/DL (ref 3.5–5.2)
ANION GAP SERPL CALCULATED.3IONS-SCNC: 10.8 MMOL/L (ref 5–15)
BACTERIA SPEC AEROBE CULT: NORMAL
BLD GP AB SCN SERPL QL: NEGATIVE
BUN SERPL-MCNC: 39 MG/DL (ref 6–20)
BUN/CREAT SERPL: 13.3 (ref 7–25)
CALCIUM SPEC-SCNC: 7.2 MG/DL (ref 8.6–10.5)
CHLORIDE SERPL-SCNC: 102 MMOL/L (ref 98–107)
CO2 SERPL-SCNC: 24.2 MMOL/L (ref 22–29)
CREAT SERPL-MCNC: 2.93 MG/DL (ref 0.57–1)
DEPRECATED RDW RBC AUTO: 45.9 FL (ref 37–54)
ERYTHROCYTE [DISTWIDTH] IN BLOOD BY AUTOMATED COUNT: 14.4 % (ref 12.3–15.4)
GFR SERPL CREATININE-BSD FRML MDRD: 17 ML/MIN/1.73
GLUCOSE BLDC GLUCOMTR-MCNC: 154 MG/DL (ref 70–130)
GLUCOSE BLDC GLUCOMTR-MCNC: 196 MG/DL (ref 70–130)
GLUCOSE BLDC GLUCOMTR-MCNC: 225 MG/DL (ref 70–130)
GLUCOSE BLDC GLUCOMTR-MCNC: 344 MG/DL (ref 70–130)
GLUCOSE SERPL-MCNC: 354 MG/DL (ref 65–99)
HCT VFR BLD AUTO: 20.2 % (ref 34–46.6)
HCT VFR BLD AUTO: 21.6 % (ref 34–46.6)
HGB BLD-MCNC: 6.4 G/DL (ref 12–15.9)
HGB BLD-MCNC: 6.8 G/DL (ref 12–15.9)
MCH RBC QN AUTO: 27.9 PG (ref 26.6–33)
MCHC RBC AUTO-ENTMCNC: 31.7 G/DL (ref 31.5–35.7)
MCV RBC AUTO: 88.2 FL (ref 79–97)
PHOSPHATE SERPL-MCNC: 3.3 MG/DL (ref 2.5–4.5)
PLATELET # BLD AUTO: 192 10*3/MM3 (ref 140–450)
PMV BLD AUTO: 11.5 FL (ref 6–12)
POTASSIUM SERPL-SCNC: 4.5 MMOL/L (ref 3.5–5.2)
RBC # BLD AUTO: 2.29 10*6/MM3 (ref 3.77–5.28)
RH BLD: POSITIVE
SODIUM SERPL-SCNC: 137 MMOL/L (ref 136–145)
T&S EXPIRATION DATE: NORMAL
WBC NRBC COR # BLD: 12.79 10*3/MM3 (ref 3.4–10.8)

## 2021-12-16 PROCEDURE — 63710000001 INSULIN LISPRO (HUMAN) PER 5 UNITS: Performed by: STUDENT IN AN ORGANIZED HEALTH CARE EDUCATION/TRAINING PROGRAM

## 2021-12-16 PROCEDURE — 63710000001 INSULIN LISPRO (HUMAN) PER 5 UNITS: Performed by: INTERNAL MEDICINE

## 2021-12-16 PROCEDURE — 25010000002 HEPARIN (PORCINE) PER 1000 UNITS: Performed by: INTERNAL MEDICINE

## 2021-12-16 PROCEDURE — P9016 RBC LEUKOCYTES REDUCED: HCPCS

## 2021-12-16 PROCEDURE — 85018 HEMOGLOBIN: CPT | Performed by: STUDENT IN AN ORGANIZED HEALTH CARE EDUCATION/TRAINING PROGRAM

## 2021-12-16 PROCEDURE — 80069 RENAL FUNCTION PANEL: CPT | Performed by: STUDENT IN AN ORGANIZED HEALTH CARE EDUCATION/TRAINING PROGRAM

## 2021-12-16 PROCEDURE — 86850 RBC ANTIBODY SCREEN: CPT | Performed by: STUDENT IN AN ORGANIZED HEALTH CARE EDUCATION/TRAINING PROGRAM

## 2021-12-16 PROCEDURE — 86901 BLOOD TYPING SEROLOGIC RH(D): CPT | Performed by: STUDENT IN AN ORGANIZED HEALTH CARE EDUCATION/TRAINING PROGRAM

## 2021-12-16 PROCEDURE — 86923 COMPATIBILITY TEST ELECTRIC: CPT

## 2021-12-16 PROCEDURE — 85027 COMPLETE CBC AUTOMATED: CPT | Performed by: STUDENT IN AN ORGANIZED HEALTH CARE EDUCATION/TRAINING PROGRAM

## 2021-12-16 PROCEDURE — 86900 BLOOD TYPING SEROLOGIC ABO: CPT

## 2021-12-16 PROCEDURE — 97530 THERAPEUTIC ACTIVITIES: CPT

## 2021-12-16 PROCEDURE — 25010000002 NA FERRIC GLUC CPLX PER 12.5 MG: Performed by: INTERNAL MEDICINE

## 2021-12-16 PROCEDURE — 63710000001 INSULIN GLARGINE PER 5 UNITS: Performed by: STUDENT IN AN ORGANIZED HEALTH CARE EDUCATION/TRAINING PROGRAM

## 2021-12-16 PROCEDURE — 85014 HEMATOCRIT: CPT | Performed by: STUDENT IN AN ORGANIZED HEALTH CARE EDUCATION/TRAINING PROGRAM

## 2021-12-16 PROCEDURE — 86900 BLOOD TYPING SEROLOGIC ABO: CPT | Performed by: STUDENT IN AN ORGANIZED HEALTH CARE EDUCATION/TRAINING PROGRAM

## 2021-12-16 PROCEDURE — 82962 GLUCOSE BLOOD TEST: CPT

## 2021-12-16 RX ADMIN — ACETAMINOPHEN 650 MG: 325 TABLET, FILM COATED ORAL at 02:05

## 2021-12-16 RX ADMIN — TAMSULOSIN HYDROCHLORIDE 0.4 MG: 0.4 CAPSULE ORAL at 14:07

## 2021-12-16 RX ADMIN — INSULIN LISPRO 5 UNITS: 100 INJECTION, SOLUTION INTRAVENOUS; SUBCUTANEOUS at 08:06

## 2021-12-16 RX ADMIN — TORSEMIDE 60 MG: 20 TABLET ORAL at 14:07

## 2021-12-16 RX ADMIN — PANTOPRAZOLE SODIUM 40 MG: 40 TABLET, DELAYED RELEASE ORAL at 14:08

## 2021-12-16 RX ADMIN — HEPARIN SODIUM 5000 UNITS: 5000 INJECTION INTRAVENOUS; SUBCUTANEOUS at 06:23

## 2021-12-16 RX ADMIN — ROPINIROLE HYDROCHLORIDE 0.75 MG: 0.5 TABLET, FILM COATED ORAL at 20:37

## 2021-12-16 RX ADMIN — MULTIPLE VITAMINS W/ MINERALS TAB 1 TABLET: TAB at 14:06

## 2021-12-16 RX ADMIN — AMLODIPINE BESYLATE 5 MG: 5 TABLET ORAL at 14:07

## 2021-12-16 RX ADMIN — INSULIN LISPRO 2 UNITS: 100 INJECTION, SOLUTION INTRAVENOUS; SUBCUTANEOUS at 08:07

## 2021-12-16 RX ADMIN — HEPARIN SODIUM 3800 UNITS: 1000 INJECTION, SOLUTION INTRAVENOUS; SUBCUTANEOUS at 13:08

## 2021-12-16 RX ADMIN — ACETAMINOPHEN 650 MG: 325 TABLET, FILM COATED ORAL at 10:35

## 2021-12-16 RX ADMIN — ASPIRIN 81 MG: 81 TABLET, CHEWABLE ORAL at 14:16

## 2021-12-16 RX ADMIN — INSULIN LISPRO 2 UNITS: 100 INJECTION, SOLUTION INTRAVENOUS; SUBCUTANEOUS at 14:09

## 2021-12-16 RX ADMIN — FOLIC ACID 1 MG: 1 TABLET ORAL at 14:07

## 2021-12-16 RX ADMIN — INSULIN LISPRO 2 UNITS: 100 INJECTION, SOLUTION INTRAVENOUS; SUBCUTANEOUS at 17:47

## 2021-12-16 RX ADMIN — NYSTATIN 500000 UNITS: 100000 SUSPENSION ORAL at 20:37

## 2021-12-16 RX ADMIN — NYSTATIN 500000 UNITS: 100000 SUSPENSION ORAL at 14:06

## 2021-12-16 RX ADMIN — HEPARIN SODIUM 5000 UNITS: 5000 INJECTION INTRAVENOUS; SUBCUTANEOUS at 14:08

## 2021-12-16 RX ADMIN — Medication 250 MG: at 14:06

## 2021-12-16 RX ADMIN — INSULIN LISPRO 2 UNITS: 100 INJECTION, SOLUTION INTRAVENOUS; SUBCUTANEOUS at 14:08

## 2021-12-16 RX ADMIN — HEPARIN SODIUM 5000 UNITS: 5000 INJECTION INTRAVENOUS; SUBCUTANEOUS at 21:00

## 2021-12-16 RX ADMIN — SODIUM CHLORIDE, PRESERVATIVE FREE 10 ML: 5 INJECTION INTRAVENOUS at 20:37

## 2021-12-16 RX ADMIN — SODIUM CHLORIDE 125 MG: 9 INJECTION, SOLUTION INTRAVENOUS at 09:30

## 2021-12-16 RX ADMIN — SODIUM CHLORIDE, PRESERVATIVE FREE 10 ML: 5 INJECTION INTRAVENOUS at 14:10

## 2021-12-16 RX ADMIN — LINAGLIPTIN 5 MG: 5 TABLET, FILM COATED ORAL at 14:06

## 2021-12-16 RX ADMIN — OLANZAPINE 5 MG: 5 TABLET ORAL at 20:37

## 2021-12-16 RX ADMIN — INSULIN GLARGINE 8 UNITS: 100 INJECTION, SOLUTION SUBCUTANEOUS at 06:23

## 2021-12-16 RX ADMIN — NYSTATIN 500000 UNITS: 100000 SUSPENSION ORAL at 17:47

## 2021-12-16 RX ADMIN — OLANZAPINE 5 MG: 5 TABLET ORAL at 14:07

## 2021-12-16 RX ADMIN — HYDROCODONE BITARTRATE AND ACETAMINOPHEN 1 TABLET: 5; 325 TABLET ORAL at 14:16

## 2021-12-16 RX ADMIN — HYDROCODONE BITARTRATE AND ACETAMINOPHEN 1 TABLET: 5; 325 TABLET ORAL at 22:01

## 2021-12-16 RX ADMIN — LEVOTHYROXINE SODIUM 125 MCG: 0.12 TABLET ORAL at 06:23

## 2021-12-16 RX ADMIN — Medication 5 MG: at 20:37

## 2021-12-16 RX ADMIN — METOPROLOL TARTRATE 25 MG: 25 TABLET, FILM COATED ORAL at 20:37

## 2021-12-16 RX ADMIN — METOPROLOL TARTRATE 25 MG: 25 TABLET, FILM COATED ORAL at 14:08

## 2021-12-16 NOTE — PLAN OF CARE
Problem: Adult Inpatient Plan of Care  Goal: Plan of Care Review  Outcome: Ongoing, Progressing  Flowsheets (Taken 12/16/2021 5399)  Progress: no change  Plan of Care Reviewed With: patient  Outcome Summary: VSS. Dialysis complete, 1 unit of blood given. Worked with PT. OOB to BSC. Kpad in use. C/o pain, see MAR. Will continue to monitor.

## 2021-12-16 NOTE — THERAPY TREATMENT NOTE
Patient Name: Zaina Martinez  : 1965    MRN: [id number]                              Today's Date: 2021       Admit Date: 2021    Visit Dx:     ICD-10-CM ICD-9-CM   1. Acute renal failure, unspecified acute renal failure type (HCC)  N17.9 584.9   2. Uncontrolled hypertension  I10 401.9   3. Anemia, unspecified type  D64.9 285.9   4. Elevated troponin  R77.8 790.6   5. Impaired mobility and activities of daily living  Z74.09 V49.89    Z78.9      Patient Active Problem List   Diagnosis   • MAYUR (acute kidney injury) (HCC)   • HTN (hypertension)   • Hypothyroidism   • Type 2 diabetes mellitus with hyperglycemia, with long-term current use of insulin (HCC)   • Rheumatoid arthritis (HCC)   • Angioedema   • GERD (gastroesophageal reflux disease)   • Anemia   • Medically noncompliant   • Myocardial infarction due to demand ischemia (HCC)   • Enteritis   • PRES (posterior reversible encephalopathy syndrome)   • Urine retention   • Klebsiella infection   • Superficial thrombophlebitis     Past Medical History:   Diagnosis Date   • Diabetes (HCC)    • Disease of thyroid gland    • GERD (gastroesophageal reflux disease)    • Hypertension    • Rheumatoid arthritis (HCC)      Past Surgical History:   Procedure Laterality Date   • EYE SURGERY     • HYSTERECTOMY     • INSERTION HEMODIALYSIS CATHETER N/A 2021    Procedure: HEMODIALYSIS CATHETER INSERTION;  Surgeon: Keli Salazar MD;  Location: [facility] ;  Service: Vascular;  Laterality: N/A;      General Information     Row Name 21 1535          Physical Therapy Time and Intention    Document Type therapy note (daily note)  -EM     Mode of Treatment individual therapy; physical therapy  -EM     Row Name 21 1535          General Information    Existing Precautions/Restrictions fall  -EM           User Key  (r) = Recorded By, (t) = Taken By, (c) = Cosigned By    Initials Name Provider Type    EM Lakeshia Reid PT Physical  Therapist               Mobility     Row Name 12/16/21 1535          Bed Mobility    Supine-Sit [place] (Bed Mobility) contact guard  -EM     Sit-Supine [place] (Bed Mobility) contact guard  -EM     Assistive Device (Bed Mobility) head of bed elevated; bed rails  -EM     Row Name 12/16/21 1535          Sit-Stand Transfer    Sit-Stand [place] (Transfers) contact guard  -EM     Assistive Device (Sit-Stand Transfers) walker, front-wheeled  -EM     Row Name 12/16/21 1530          Gait/Stairs (Locomotion)    [place] Level (Gait) contact guard; minimum assist (75% patient effort)  -EM     Assistive Device (Gait) walker, front-wheeled  -EM     Distance in Feet (Gait) 100  -EM     Deviations/Abnormal Patterns (Gait) gait speed decreased  -EM     Bilateral Gait Deviations heel strike decreased  -EM           User Key  (r) = Recorded By, (t) = Taken By, (c) = Cosigned By    Initials Name Provider Type    EM Lakeshia Reid, PT Physical Therapist               Obj/Interventions    No documentation.                Goals/Plan    No documentation.                Clinical Impression     Row Name 12/16/21 1536          Pain Scale: Numbers Pre/Post-Treatment    Pre/Posttreatment Pain Comment pt reports back pain improved by K-pad, also c/o RLE pain but did not rate  -EM     Pain Intervention(s) Repositioned  -EM     Row Name 12/16/21 1539          Plan of Care Review    Plan of Care Reviewed With patient  -EM     Progress improving  -EM     Outcome Summary Patient c/o fatigue after dialysis but agreeable to ambulation. Patient able to tolerate increased ambulation distance, steadier today. Anticipate d/c to SNU soon  -EM     Row Name 12/16/21 1534          Positioning and Restraints    Pre-Treatment Position in bed  -EM     Post Treatment Position bed  -EM     In Bed supine; call light within reach; exit alarm on  -EM           User Key  (r) = Recorded By, (t) = Taken By, (c) = Cosigned By    Initials Name  Provider Type    Lakeshia Hamilton PT Physical Therapist               Outcome Measures     Row Name 12/16/21 1538          How much help from another person do you currently need...    Turning from your back to your side while in flat bed without using bedrails? 3  -EM     Moving from lying on back to sitting on the side of a flat bed without bedrails? 3  -EM     Moving to and from a bed to a chair (including a wheelchair)? 3  -EM     Standing up from a chair using your arms (e.g., wheelchair, bedside chair)? 3  -EM     Climbing 3-5 steps with a railing? 2  -EM     To walk in hospital room? 3  -EM     AM-PAC 6 Clicks Score (PT) 17  -EM           User Key  (r) = Recorded By, (t) = Taken By, (c) = Cosigned By    Initials Name Provider Type    Lakeshia Hamilton PT Physical Therapist                             Physical Therapy Education                 Title: PT OT SLP Therapies (In Progress)     Topic: Physical Therapy (In Progress)     Point: Mobility training (In Progress)     Learning Progress Summary           Patient Acceptance, E, VU by EM at 12/16/2021 1538   Family Acceptance, E,D, NR by JERZY at 12/13/2021 1512      Show all documentation for this point (4)                 Point: Home exercise program (In Progress)     Learning Progress Summary           Patient Acceptance, E, NR by AR at 12/14/2021 1016   Family Acceptance, E,D, NR by JERZY at 12/13/2021 1512      Show all documentation for this point (2)                 Point: Body mechanics (In Progress)     Learning Progress Summary           Patient Acceptance, E, NR by AR at 12/14/2021 1016   Family Acceptance, E,D, NR by JERZY at 12/13/2021 1512      Show all documentation for this point (2)                 Point: Precautions (In Progress)     Learning Progress Summary           Patient Acceptance, E, NR by AR at 12/14/2021 1016   Family Acceptance, E,D, NR by JERZY at 12/13/2021 1512      Show all documentation for this point (2)                              User Key     Initials Effective Dates Name Provider Type Discipline    EM 06/16/21 -  Lakeshia Reid PT Physical Therapist PT    AR 06/16/21 -  Nell Aragon PT Physical Therapist PT    JERZY 10/25/21 -  Osmani Aguayo PT Student PT Student PT              PT Recommendation and Plan     Plan of Care Reviewed With: patient  Progress: improving  Outcome Summary: Patient c/o fatigue after dialysis but agreeable to ambulation. Patient able to tolerate increased ambulation distance, steadier today. Anticipate d/c to SNU soon     Time Calculation:    PT Charges     Row Name 12/16/21 1539             Time Calculation    Start Time 1440  -EM      Stop Time 1450  -EM      Time Calculation (min) 10 min  -EM      PT Received On 12/16/21  -EM      PT - Next Appointment 12/17/21  -EM              Time Calculation- PT    Total Timed Code Minutes- PT 10 minute(s)  -EM              Timed Charges    84528 - PT Therapeutic Activity Minutes 10  -EM              Total Minutes    Timed Charges Total Minutes 10  -EM       Total Minutes 10  -EM            User Key  (r) = Recorded By, (t) = Taken By, (c) = Cosigned By    Initials Name Provider Type    EM Lakeshia Reid PT Physical Therapist              Therapy Charges for Today     Code Description Service Date Service Provider Modifiers Qty    81572128782 HC PT THERAPEUTIC ACT EA 15 MIN 12/15/2021 Lakeshia Reid, PT GP 1    72677073881 HC PT THERAPEUTIC ACT EA 15 MIN 12/16/2021 Lakeshia Reid, PT GP 1          PT G-Codes  Outcome Measure Options: AM-PAC 6 Clicks Basic Mobility (PT)  AM-PAC 6 Clicks Score (PT): 17  AM-PAC 6 Clicks Score (OT): 18  Modified Iza Scale: 4 - Moderately severe disability.  Unable to walk without assistance, and unable to attend to own bodily needs without assistance.    Lakeshia Reid PT  12/16/2021

## 2021-12-16 NOTE — PROGRESS NOTES
Name: Zaina Martinez ADMIT: 2021   : 1965  PCP: Provider, No Known    MRN: [id number] LOS: 17 days   AGE/SEX: 56 y.o. female  ROOM: [facility]/     Subjective   Subjective   Patient seen this morning.  No acute events overnight.  Feels tired this morning.  States does not have much energy.  Globin came back at 6.4, repeat 1 was 6.8.  No signs of bleeding.  Patient scheduled for dialysis today.  Ordered 1 unit of blood to be transfused.    Review of Systems   As above  Objective   Objective   Vital Signs  /84, RR 16  SpO2:  [89 %-98 %] 94 %  on  Flow (L/min):  [2] 2;   Device (Oxygen Therapy): room air  Body mass index is 29.14 kg/m².  Physical Exam  Constitutional:Awake, alert  HENT: NCAT, mucous membranes moist, neck supple  Respiratory: Clear to auscultation bilaterally, respiratory effort normal, nonlabored breathing   Cardiovascular: RRR, normal radial pulses  Gastrointestinal: Positive bowel sounds, soft, nontender, nondistended  Musculoskeletal: Normal musculature for age, minimal lower extremity edema  Neurologic:  Oriented,  Moving all extremities,follows commands  Skin: No rashes or jaundice, warm     Results Review     I reviewed the patient's new clinical results.  Results from last 7 days   Lab Units 21  0800 21  0529 12/15/21  0657 21  1229 21  06   WBC 10*3/mm3  --  12.79* 11.49* 10.64  --  9.59   HEMOGLOBIN g/dL 6.8* 6.4* 7.7* 7.3*   < > 6.7*   PLATELETS 10*3/mm3  --  192 218 231  --  202    < > = values in this interval not displayed.     Results from last 7 days   Lab Units 21  0529 12/15/21  0657 21  07521  0627   SODIUM mmol/L 137 138 138 138   POTASSIUM mmol/L 4.5 4.0 4.3 4.5   CHLORIDE mmol/L 102 103 103 106   CO2 mmol/L 24.2 27.3 24.8 26.1   BUN mg/dL 39* 25* 41* 31*   CREATININE mg/dL 2.93* 2.58* 3.48* 3.22*   GLUCOSE mg/dL 354* 244* 156* 136*   Estimated Creatinine Clearance: 20 mL/min (A) (by C-G formula based on  SCr of 2.93 mg/dL (H)).  Results from last 7 days   Lab Units 12/16/21  0529 12/15/21  0657 12/14/21  0752 12/13/21  0627   ALBUMIN g/dL 1.70* 2.00* 1.80* 1.60*     Results from last 7 days   Lab Units 12/16/21  0529 12/15/21  0657 12/14/21  0752 12/13/21  0627   CALCIUM mg/dL 7.2* 7.3* 7.5* 7.4*   ALBUMIN g/dL 1.70* 2.00* 1.80* 1.60*   PHOSPHORUS mg/dL 3.3 3.1 3.1 2.7       COVID19   Date Value Ref Range Status   12/06/2021 Not Detected Not Detected - Ref. Range Final   11/29/2021 Not Detected Not Detected - Ref. Range Final     Glucose   Date/Time Value Ref Range Status   12/16/2021 0607 344 (H) 70 - 130 mg/dL Final     Comment:     Meter: [id number] : [id number] Mark Marlena NA   12/15/2021 2018 228 (H) 70 - 130 mg/dL Final     Comment:     Meter: [id number] : [id number] Mark Marlena NA   12/15/2021 1603 131 (H) 70 - 130 mg/dL Final     Comment:     Meter: [id number] : [id number] Jonn Zaina NA   12/15/2021 1110 311 (H) 70 - 130 mg/dL Final     Comment:     Meter: [id number] : [id number] Jonn Zaina NA   12/15/2021 0610 253 (H) 70 - 130 mg/dL Final     Comment:     Meter: [id number] : [id number] Mark Marlena NA   12/14/2021 2030 242 (H) 70 - 130 mg/dL Final     Comment:     Meter: [id number] : [id number] Mark Marlena NA   12/14/2021 1627 166 (H) 70 - 130 mg/dL Final     Comment:     Meter: [id number] : [id number] [place] NA       XR Chest 1 View  PORTABLE CHEST 12/11/2021 AT 12:40 PM     CLINICAL HISTORY: Confusion. Leukocytosis. Intermittent cough.     Compared to a previous chest dated 12/06/2021.     Right internal jugular dialysis catheter remains in place in  satisfactory position without change. The lungs are better inflated than  on the previous chest x-ray. There remain free of focal infiltrates. A  tiny left pleural effusion is suspected. The heart is normal in size.  The pulmonary vasculature limits.     This report was finalized on 12/11/2021 1:14 PM by Dr. Ledezma  DAVID Barron       Scheduled Medications  amLODIPine, 5 mg, Oral, Q24H  aspirin, 81 mg, Oral, Daily  folic acid, 1 mg, Oral, Daily  heparin (porcine), 5,000 Units, Subcutaneous, Q8H  insulin glargine, 8 Units, Subcutaneous, QAM  insulin lispro, 0-7 Units, Subcutaneous, TID AC  insulin lispro, 2 Units, Subcutaneous, TID With Meals  levothyroxine, 125 mcg, Oral, Q AM  linagliptin, 5 mg, Oral, Daily  melatonin, 5 mg, Oral, Nightly  metoprolol tartrate, 25 mg, Oral, Q12H  multivitamin with minerals, 1 tablet, Oral, Daily  nystatin, 5 mL, Swish & Swallow, 4x Daily  OLANZapine, 5 mg, Oral, BID  pantoprazole, 40 mg, Oral, Daily  rOPINIRole, 0.75 mg, Oral, Nightly  sodium chloride, 10 mL, Intravenous, Q12H  tamsulosin, 0.4 mg, Oral, Daily  thiamine, 250 mg, Oral, Daily  torsemide, 60 mg, Oral, Daily    Infusions  sodium chloride, 9 mL/hr, Last Rate: 9 mL/hr (12/06/21 1210)    Diet  Diet Regular       Assessment/Plan     Active Hospital Problems    Diagnosis  POA   • **MAYUR (acute kidney injury) (HCC) [N17.9]  Yes   • Superficial thrombophlebitis [I80.9]  Clinically Undetermined   • Urine retention [R33.9]  Yes   • Klebsiella infection [A49.8]  Yes   • PRES (posterior reversible encephalopathy syndrome) [I67.83]  Yes   • Enteritis [K52.9]  Clinically Undetermined   • Medically noncompliant [Z91.19]  Not Applicable   • Myocardial infarction due to demand ischemia (HCC) [I21.A1]  Yes   • Angioedema [T78.3XXA]  Yes   • Anemia [D64.9]  Yes   • HTN (hypertension) [I10]  Yes   • Hypothyroidism [E03.9]  Yes   • Type 2 diabetes mellitus with hyperglycemia, with long-term current use of insulin (HCC) [E11.65, Z79.4]  Not Applicable   • Rheumatoid arthritis (HCC) [M06.9]  Yes      Resolved Hospital Problems    Diagnosis Date Resolved POA   • Hypertensive urgency [I16.0] 11/30/2021 Yes       Zaina Martinez is a 56 y.o. female presents with acute kidney injury, enteritis, hypothyroidism, and acute confusion felt to be related  to PRES.       Essential hypertension  -On amlodipine 2.5 mg daily, metoprolol 25 mg every 12  -increased amlodipine to 5 mg daily  12/14  -On 10 mg  IV labetalol PRN for systolic above 170  -Continue to monitor and adjust as necessary.        MAYUR on CKD stage IV  -Now likely ESRD  -Nephrology following  -Inpatient dialysis.     Psychosis, possible undiagnosed bipolar syndrome with acute psychotic manic phase:   feels patient has undiagnosed bipolar and has had previous episode of psychosis at her last hospitalization when she had COVID-19.  Plan for mood stabilizer, patient has required restraints.  -Psychosis now resolved.  -Zyprexa 5mg bid  -psychiatry following   -Improving    Diabetes mellitus type 2 with long-term use of insulin.  -Blood glucose not at goal.  -Increase glargine to 8 units every morning on 12/16  -Continue lispro to units with meals and sliding scale.     PRES, multifactorial metabolic encephalopathy:   PRES Diagnosed by neurology on MRI.  Working to gently regulate blood pressure.  On amlodipine 2.5 mg daily, metoprolol 25 mg every 12  increased amlodipine to 5 mg daily      Hypothyroidism:   -TSH is severely elevated at 152.      -Patient must be noncompliant with thyroid medicine.    -Synthroid adjusted.    -Recommend she have free T4 and TSH within 1 month to continue to monitor.    -Patient admits to missing her thyroid medication.  I explained to her that this is a very important medication.     Enteritis and UTI:  Abdominal symptoms resolved.    Finished  ceftriaxone     Diabetes: Monitor glucose and adjust as needed.  A1c 9.8.  Poorly controlled.  Started Tradjenta.  Correction insulin.  Glucose improved     Demand ischemia: Medical management.  Cardiology has evaluated.  Normal echocardiogram.  Needs outpatient follow-up with cardiology.  She agrees with this plan.     Severe anemia:  -Combination of anemia of chronic disease secondary to CKD and iron deficiency anemia.  Status  post transfusion.  Hemoccult negative.    No active bleeding reported.  Folic acid is borderline low.  Started Folic acid supplements.  Hemoglobin was 6.4 on 12/16, repeat 1 was at 6.8, ordered 1 unit of PRBCs to be transfused on 12/15  -Patient receiving for ferrlecit  with dialysis per nephrology     Urine retention: Plan to trial Flomax.  Now better     Debility: PT.  Skilled facility when possible.     DVT Prophylaxis: Heparin 5000 every 8 for DVT prophylaxis.  Disposition: Skilled facility ,pending placement , medically ready to be discharged.    Albert Templeton MD  [facility]  12/16/21  10:50 EST

## 2021-12-16 NOTE — NURSING NOTE
HD complete, 2 liters removed.  CVC dressing changed and dated, 1 unit of PRBC's administered during dialysis.  Post /81 HR 70 T 98

## 2021-12-16 NOTE — CASE MANAGEMENT/SOCIAL WORK
Continued Stay Note  [facility]     Patient Name: Zaina Martinez  MRN: [id number]  Today's Date: 12/16/2021    Admit Date: 11/29/2021     Discharge Plan     Row Name 12/16/21 1640       Plan    Plan SNF with HD setup, Notified [facility] to start precert once HD obtained.  Needs [facility] precert.    Patient/Family in Agreement with Plan yes    Plan Comments Called and spoke to Nicole/Pricila Perez and she will follow up on HD chair. Asked Nicole to start pre cert once HD chair obtained. Francisco J Patterson RN-BC               Discharge Codes    No documentation.               Expected Discharge Date and Time     Expected Discharge Date Expected Discharge Time    Dec 17, 2021             Francisco J Patterson RN

## 2021-12-16 NOTE — PROGRESS NOTES
"   LOS: 17 days    Patient Care Team:  Provider, No Known as PCP - General    Chief Complaint:    Chief Complaint   Patient presents with   • Altered Mental Status     Follow up MAYUR CKD4  Subjective     Interval History:   Follow up MAYUR on CKD IV. Seen on dialysis earlier.  Does not feel as well.  Feels a little achy.  Eating. Bowels moving.  Urine not recorded.  Not soa.  Qb 400.   systolic.   Objective     Vital Signs  Temp:  [97.7 °F (36.5 °C)-98.4 °F (36.9 °C)] 98.2 °F (36.8 °C)  Heart Rate:  [64-90] 66  Resp:  [14-18] 18  BP: (106-151)/(53-92) 142/75    Flowsheet Rows      First Filed Value   Admission Height 157.5 cm (62\") Documented at 11/29/2021 2011   Admission Weight 59 kg (130 lb) Documented at 11/30/2021 0200          I/O this shift:  In: 500 [P.O.:200; Blood:300]  Out: 2000 [Other:2000]  I/O last 3 completed shifts:  In: 1970 [P.O.:1970]  Out: 200 [Urine:200]    Intake/Output Summary (Last 24 hours) at 12/16/2021 1319  Last data filed at 12/16/2021 1300  Gross per 24 hour   Intake 1580 ml   Output 2000 ml   Net -420 ml       Physical Exam:  General Appearance: Chronically ill. On dialysis .  Mask in place .   Neck no jvd   Right chest TDC  Heart RRR No s3 or rub.   Lungs clear to auscultation, no wheezing.   Abd + bs, soft nontender.   Ext 1+ lower ext edema.    Neuro awake, oriented. Moves all 4 ext.                  Results Review:    Results from last 7 days   Lab Units 12/16/21  0529 12/15/21  0657 12/14/21  0752   SODIUM mmol/L 137 138 138   POTASSIUM mmol/L 4.5 4.0 4.3   CHLORIDE mmol/L 102 103 103   CO2 mmol/L 24.2 27.3 24.8   BUN mg/dL 39* 25* 41*   CREATININE mg/dL 2.93* 2.58* 3.48*   CALCIUM mg/dL 7.2* 7.3* 7.5*   GLUCOSE mg/dL 354* 244* 156*       Estimated Creatinine Clearance: 19.7 mL/min (A) (by C-G formula based on SCr of 2.93 mg/dL (H)).    Results from last 7 days   Lab Units 12/16/21  0529 12/15/21  0657 12/14/21  0752   PHOSPHORUS mg/dL 3.3 3.1 3.1             Results from last " 7 days   Lab Units 12/16/21  0800 12/16/21  0529 12/15/21  0657 12/14/21  0752 12/13/21  1229 12/13/21  0627 12/13/21  0627 12/12/21  0602 12/12/21  0602   WBC 10*3/mm3  --  12.79* 11.49* 10.64  --   --  9.59  --  9.73   HEMOGLOBIN g/dL 6.8* 6.4* 7.7* 7.3* 8.0*   < > 6.7*   < > 7.5*   PLATELETS 10*3/mm3  --  192 218 231  --   --  202  --  233    < > = values in this interval not displayed.               Imaging Results (Last 24 Hours)     ** No results found for the last 24 hours. **        amLODIPine, 5 mg, Oral, Q24H  aspirin, 81 mg, Oral, Daily  folic acid, 1 mg, Oral, Daily  heparin (porcine), 5,000 Units, Subcutaneous, Q8H  insulin glargine, 8 Units, Subcutaneous, QAM  insulin lispro, 0-7 Units, Subcutaneous, TID AC  insulin lispro, 2 Units, Subcutaneous, TID With Meals  levothyroxine, 125 mcg, Oral, Q AM  linagliptin, 5 mg, Oral, Daily  melatonin, 5 mg, Oral, Nightly  metoprolol tartrate, 25 mg, Oral, Q12H  multivitamin with minerals, 1 tablet, Oral, Daily  nystatin, 5 mL, Swish & Swallow, 4x Daily  OLANZapine, 5 mg, Oral, BID  pantoprazole, 40 mg, Oral, Daily  rOPINIRole, 0.75 mg, Oral, Nightly  sodium chloride, 10 mL, Intravenous, Q12H  tamsulosin, 0.4 mg, Oral, Daily  thiamine, 250 mg, Oral, Daily  torsemide, 60 mg, Oral, Daily      sodium chloride, 9 mL/hr, Last Rate: 9 mL/hr (12/06/21 1210)        Medication Review:   Current Facility-Administered Medications   Medication Dose Route Frequency Provider Last Rate Last Admin   • acetaminophen (TYLENOL) tablet 650 mg  650 mg Oral Q4H PRN Keli Salazar MD   650 mg at 12/16/21 1035   • amLODIPine (NORVASC) tablet 5 mg  5 mg Oral Q24H Albert Templeton MD   5 mg at 12/15/21 0915   • aspirin chewable tablet 81 mg  81 mg Oral Daily Keli Salazar MD   81 mg at 12/15/21 0915   • dextrose (D50W) (25 g/50 mL) IV injection 25 g  25 g Intravenous Q15 Min PRN Keli Salazar MD   25 g at 12/08/21 1223   • dextrose (GLUTOSE) oral gel 15 g  15 g Oral  Q15 Min PRN Keli Salazar MD   15 g at 12/08/21 1502   • folic acid (FOLVITE) tablet 1 mg  1 mg Oral Daily Keli Salazar MD   1 mg at 12/15/21 0915   • glucagon (human recombinant) (GLUCAGEN DIAGNOSTIC) injection 1 mg  1 mg Subcutaneous PRN Keli Salazar MD       • heparin (porcine) 5000 UNIT/ML injection 5,000 Units  5,000 Units Subcutaneous Q8H Osmar Alva MD   5,000 Units at 12/16/21 0623   • heparin (porcine) injection 3,800 Units  3,800 Units Intracatheter PRN Alejo Lange MD   3,800 Units at 12/16/21 1308   • HYDROcodone-acetaminophen (NORCO) 5-325 MG per tablet 1 tablet  1 tablet Oral Q6H PRN Osmar Alva MD   1 tablet at 12/15/21 2336   • insulin glargine (LANTUS, SEMGLEE) injection 8 Units  8 Units Subcutaneous Albert Diaz MD   8 Units at 12/16/21 0623   • insulin lispro (ADMELOG) injection 0-7 Units  0-7 Units Subcutaneous TID AC Keli Slaazar MD   5 Units at 12/16/21 0806   • insulin lispro (ADMELOG) injection 2 Units  2 Units Subcutaneous TID With Meals Osmar Alva MD   2 Units at 12/16/21 0807   • labetalol (NORMODYNE,TRANDATE) injection 10 mg  10 mg Intravenous Q6H PRN Keli Salazar MD   10 mg at 12/08/21 1246   • levothyroxine (SYNTHROID, LEVOTHROID) tablet 125 mcg  125 mcg Oral Q AM Osmar Alva MD   125 mcg at 12/16/21 0623   • linagliptin (TRADJENTA) tablet 5 mg  5 mg Oral Daily Keli Salazar MD   5 mg at 12/15/21 0915   • loperamide (IMODIUM) capsule 2 mg  2 mg Oral 4x Daily PRN Keli Salazar MD   2 mg at 12/13/21 1455   • melatonin tablet 5 mg  5 mg Oral Nightly Artie, Osmar Mat, MD   5 mg at 12/15/21 2053   • metoprolol tartrate (LOPRESSOR) tablet 25 mg  25 mg Oral Q12H Keli Salazar MD   25 mg at 12/15/21 2052   • multivitamin with minerals 1 tablet  1 tablet Oral Daily Osmar Alva MD   1 tablet at 12/15/21 0915   • nitroglycerin (NITROSTAT) SL  tablet 0.4 mg  0.4 mg Sublingual Q5 Min PRN Keli Salazar MD       • nystatin (MYCOSTATIN) 100,000 unit/mL suspension 500,000 Units  5 mL Swish & Swallow 4x Daily Osmar Alva MD   500,000 Units at 12/15/21 1701   • OLANZapine (zyPREXA) injection 5 mg  5 mg Intramuscular Q8H PRN Yordan Suero III, MD   5 mg at 12/10/21 1851   • OLANZapine (zyPREXA) tablet 5 mg  5 mg Oral BID Osmar Alva MD   5 mg at 12/15/21 2053   • pantoprazole (PROTONIX) EC tablet 40 mg  40 mg Oral Daily Keli Salazar MD   40 mg at 12/15/21 0915   • prochlorperazine (COMPAZINE) injection 5 mg  5 mg Intravenous Q6H PRN Keli Salazar MD       • rOPINIRole (REQUIP) tablet 0.75 mg  0.75 mg Oral Nightly Keli Salazar MD   0.75 mg at 12/15/21 2053   • sodium chloride 0.9 % flush 10 mL  10 mL Intravenous Q12H Keli Salazar MD   10 mL at 12/15/21 2053   • sodium chloride 0.9 % flush 10 mL  10 mL Intravenous PRN Keli Salazar MD       • sodium chloride 0.9 % infusion  9 mL/hr Intravenous Continuous PRN Keli Salazar MD 9 mL/hr at 12/06/21 1210 9 mL/hr at 12/06/21 1210   • tamsulosin (FLOMAX) 24 hr capsule 0.4 mg  0.4 mg Oral Daily Osmar Alva MD   0.4 mg at 12/15/21 0915   • thiamine (VITAMIN B-1) tablet 250 mg  250 mg Oral Daily Osmar Alva MD   250 mg at 12/15/21 0915   • torsemide (DEMADEX) tablet 60 mg  60 mg Oral Daily Ruth Lira MD   60 mg at 12/15/21 1230       Assessment/Plan   1.  MAYUR on CKD stage 4, now dialysis-dependent.  Likely new ESRD.  Outpt spot being arranged .  HD today.   2.  Confusion.  Multifactorial secondary possible to metabolic encephalopathy in addition to PRES. Improving.   3.  DM2 with poor control. On Tradjenta. [facility] managing.   4.  Anemia secondary to iron deficiency anemia/CKD.  Ferritin was 100 and iron saturation 21. Ferrlecit dose 2 with HD tomorrow.  5.  Hypertension. Much better.  6.  Type II  non-NSTEMI.  7.  Severe hypothyroidism.  On replacement.  8.  Medication noncompliance.  9.  Diarrhea, resolved       Plan  1.  Dialysis today.   2. Asked for strict urine measurement.       Ruth Lira MD  12/16/21  13:19 EST

## 2021-12-16 NOTE — PLAN OF CARE
Problem: Adult Inpatient Plan of Care  Goal: Plan of Care Review  Outcome: Ongoing, Progressing  Flowsheets (Taken 12/16/2021 0408)  Progress: improving  Plan of Care Reviewed With: patient  Outcome Summary: VSS. Pt on 2L throughout night. Pt c/o chronic back pain- Kpad applied, norco and tylenol given per MAR. Dialysis scheduled for today. Up to [facility] throughout shift with staff. Pt stable and needs met at this time.

## 2021-12-17 ENCOUNTER — HOME HEALTH ADMISSION (OUTPATIENT)
Dept: HOME HEALTH SERVICES | Facility: HOME HEALTHCARE | Age: 56
End: 2021-12-17

## 2021-12-17 ENCOUNTER — READMISSION MANAGEMENT (OUTPATIENT)
Dept: CALL CENTER | Facility: HOSPITAL | Age: 56
End: 2021-12-17

## 2021-12-17 VITALS
SYSTOLIC BLOOD PRESSURE: 129 MMHG | HEIGHT: 62 IN | BODY MASS INDEX: 28.34 KG/M2 | DIASTOLIC BLOOD PRESSURE: 79 MMHG | OXYGEN SATURATION: 93 % | HEART RATE: 68 BPM | TEMPERATURE: 98.8 F | RESPIRATION RATE: 18 BRPM | WEIGHT: 154 LBS

## 2021-12-17 LAB
ANION GAP SERPL CALCULATED.3IONS-SCNC: 8.2 MMOL/L (ref 5–15)
BH BB BLOOD EXPIRATION DATE: NORMAL
BH BB BLOOD TYPE BARCODE: 5100
BH BB DISPENSE STATUS: NORMAL
BH BB PRODUCT CODE: NORMAL
BH BB UNIT NUMBER: NORMAL
BUN SERPL-MCNC: 32 MG/DL (ref 6–20)
BUN/CREAT SERPL: 12.3 (ref 7–25)
CALCIUM SPEC-SCNC: 7.7 MG/DL (ref 8.6–10.5)
CHLORIDE SERPL-SCNC: 101 MMOL/L (ref 98–107)
CO2 SERPL-SCNC: 27.8 MMOL/L (ref 22–29)
CREAT SERPL-MCNC: 2.6 MG/DL (ref 0.57–1)
CROSSMATCH INTERPRETATION: NORMAL
DEPRECATED RDW RBC AUTO: 48 FL (ref 37–54)
ERYTHROCYTE [DISTWIDTH] IN BLOOD BY AUTOMATED COUNT: 15.1 % (ref 12.3–15.4)
GFR SERPL CREATININE-BSD FRML MDRD: 19 ML/MIN/1.73
GLUCOSE BLDC GLUCOMTR-MCNC: 127 MG/DL (ref 70–130)
GLUCOSE BLDC GLUCOMTR-MCNC: 182 MG/DL (ref 70–130)
GLUCOSE BLDC GLUCOMTR-MCNC: 324 MG/DL (ref 70–130)
GLUCOSE SERPL-MCNC: 73 MG/DL (ref 65–99)
HCT VFR BLD AUTO: 25.2 % (ref 34–46.6)
HGB BLD-MCNC: 8 G/DL (ref 12–15.9)
MCH RBC QN AUTO: 27.6 PG (ref 26.6–33)
MCHC RBC AUTO-ENTMCNC: 31.7 G/DL (ref 31.5–35.7)
MCV RBC AUTO: 86.9 FL (ref 79–97)
PLATELET # BLD AUTO: 178 10*3/MM3 (ref 140–450)
PMV BLD AUTO: 11.9 FL (ref 6–12)
POTASSIUM SERPL-SCNC: 4.2 MMOL/L (ref 3.5–5.2)
RBC # BLD AUTO: 2.9 10*6/MM3 (ref 3.77–5.28)
SODIUM SERPL-SCNC: 137 MMOL/L (ref 136–145)
UNIT  ABO: NORMAL
UNIT  RH: NORMAL
WBC NRBC COR # BLD: 13.27 10*3/MM3 (ref 3.4–10.8)

## 2021-12-17 PROCEDURE — 85027 COMPLETE CBC AUTOMATED: CPT | Performed by: STUDENT IN AN ORGANIZED HEALTH CARE EDUCATION/TRAINING PROGRAM

## 2021-12-17 PROCEDURE — 80048 BASIC METABOLIC PNL TOTAL CA: CPT | Performed by: STUDENT IN AN ORGANIZED HEALTH CARE EDUCATION/TRAINING PROGRAM

## 2021-12-17 PROCEDURE — 25010000002 HEPARIN (PORCINE) PER 1000 UNITS: Performed by: INTERNAL MEDICINE

## 2021-12-17 PROCEDURE — 97530 THERAPEUTIC ACTIVITIES: CPT

## 2021-12-17 PROCEDURE — 82962 GLUCOSE BLOOD TEST: CPT

## 2021-12-17 PROCEDURE — 63710000001 INSULIN LISPRO (HUMAN) PER 5 UNITS: Performed by: STUDENT IN AN ORGANIZED HEALTH CARE EDUCATION/TRAINING PROGRAM

## 2021-12-17 PROCEDURE — 63710000001 INSULIN LISPRO (HUMAN) PER 5 UNITS: Performed by: INTERNAL MEDICINE

## 2021-12-17 PROCEDURE — 63710000001 INSULIN GLARGINE PER 5 UNITS: Performed by: STUDENT IN AN ORGANIZED HEALTH CARE EDUCATION/TRAINING PROGRAM

## 2021-12-17 RX ORDER — OLANZAPINE 5 MG/1
5 TABLET ORAL 2 TIMES DAILY
Qty: 60 TABLET | Refills: 0 | Status: SHIPPED | OUTPATIENT
Start: 2021-12-17 | End: 2022-01-09

## 2021-12-17 RX ORDER — AMLODIPINE BESYLATE 10 MG/1
10 TABLET ORAL
Qty: 30 TABLET | Refills: 0 | Status: SHIPPED | OUTPATIENT
Start: 2021-12-18 | End: 2021-12-17

## 2021-12-17 RX ORDER — ALBUMIN (HUMAN) 12.5 G/50ML
12.5 SOLUTION INTRAVENOUS AS NEEDED
Status: CANCELLED | OUTPATIENT
Start: 2021-12-18 | End: 2021-12-18

## 2021-12-17 RX ORDER — MULTIPLE VITAMINS W/ MINERALS TAB 9MG-400MCG
1 TAB ORAL DAILY
Qty: 30 TABLET | Refills: 0 | Status: SHIPPED | OUTPATIENT
Start: 2021-12-18 | End: 2021-12-17

## 2021-12-17 RX ORDER — MULTIPLE VITAMINS W/ MINERALS TAB 9MG-400MCG
1 TAB ORAL DAILY
Qty: 30 TABLET | Refills: 0 | Status: SHIPPED | OUTPATIENT
Start: 2021-12-18 | End: 2022-01-09

## 2021-12-17 RX ORDER — TORSEMIDE 100 MG/1
100 TABLET ORAL DAILY
Qty: 30 TABLET | Refills: 0 | Status: SHIPPED | OUTPATIENT
Start: 2021-12-18 | End: 2021-12-17

## 2021-12-17 RX ORDER — TORSEMIDE 100 MG/1
100 TABLET ORAL DAILY
Qty: 30 TABLET | Refills: 0 | Status: SHIPPED | OUTPATIENT
Start: 2021-12-18 | End: 2021-12-29 | Stop reason: HOSPADM

## 2021-12-17 RX ORDER — AMLODIPINE BESYLATE 10 MG/1
10 TABLET ORAL
Qty: 30 TABLET | Refills: 0 | Status: ON HOLD | OUTPATIENT
Start: 2021-12-18 | End: 2021-12-29 | Stop reason: SDUPTHER

## 2021-12-17 RX ORDER — SERTRALINE HYDROCHLORIDE 100 MG/1
50 TABLET, FILM COATED ORAL DAILY
Qty: 15 TABLET | Refills: 0 | Status: SHIPPED | OUTPATIENT
Start: 2021-12-17 | End: 2022-01-09

## 2021-12-17 RX ORDER — TORSEMIDE 100 MG/1
100 TABLET ORAL DAILY
Status: DISCONTINUED | OUTPATIENT
Start: 2021-12-18 | End: 2021-12-17 | Stop reason: HOSPADM

## 2021-12-17 RX ORDER — LEVOTHYROXINE SODIUM 0.12 MG/1
125 TABLET ORAL DAILY
Qty: 30 TABLET | Refills: 0 | Status: SHIPPED | OUTPATIENT
Start: 2021-12-17 | End: 2022-01-09

## 2021-12-17 RX ORDER — AMLODIPINE BESYLATE 10 MG/1
10 TABLET ORAL
Status: DISCONTINUED | OUTPATIENT
Start: 2021-12-17 | End: 2021-12-17 | Stop reason: HOSPADM

## 2021-12-17 RX ORDER — TAMSULOSIN HYDROCHLORIDE 0.4 MG/1
0.4 CAPSULE ORAL DAILY
Qty: 30 CAPSULE | Refills: 0 | Status: SHIPPED | OUTPATIENT
Start: 2021-12-18 | End: 2021-12-17

## 2021-12-17 RX ORDER — SERTRALINE HYDROCHLORIDE 100 MG/1
50 TABLET, FILM COATED ORAL DAILY
Qty: 15 TABLET | Refills: 0 | Status: SHIPPED | OUTPATIENT
Start: 2021-12-17 | End: 2021-12-17 | Stop reason: SDUPTHER

## 2021-12-17 RX ORDER — FOLIC ACID 1 MG/1
1 TABLET ORAL DAILY
Qty: 30 TABLET | Refills: 0 | Status: SHIPPED | OUTPATIENT
Start: 2021-12-18 | End: 2022-01-09

## 2021-12-17 RX ORDER — FOLIC ACID 1 MG/1
1 TABLET ORAL DAILY
Qty: 30 TABLET | Refills: 0 | Status: SHIPPED | OUTPATIENT
Start: 2021-12-18 | End: 2021-12-17

## 2021-12-17 RX ORDER — TAMSULOSIN HYDROCHLORIDE 0.4 MG/1
0.4 CAPSULE ORAL DAILY
Qty: 30 CAPSULE | Refills: 0 | Status: SHIPPED | OUTPATIENT
Start: 2021-12-18 | End: 2022-01-09

## 2021-12-17 RX ORDER — LEVOTHYROXINE SODIUM 0.12 MG/1
125 TABLET ORAL DAILY
Qty: 30 TABLET | Refills: 0 | Status: SHIPPED | OUTPATIENT
Start: 2021-12-17 | End: 2021-12-17 | Stop reason: SDUPTHER

## 2021-12-17 RX ORDER — OLANZAPINE 5 MG/1
5 TABLET ORAL 2 TIMES DAILY
Qty: 60 TABLET | Refills: 0 | Status: SHIPPED | OUTPATIENT
Start: 2021-12-17 | End: 2021-12-17

## 2021-12-17 RX ADMIN — OLANZAPINE 5 MG: 5 TABLET ORAL at 20:00

## 2021-12-17 RX ADMIN — INSULIN LISPRO 2 UNITS: 100 INJECTION, SOLUTION INTRAVENOUS; SUBCUTANEOUS at 08:55

## 2021-12-17 RX ADMIN — ROPINIROLE HYDROCHLORIDE 0.75 MG: 0.5 TABLET, FILM COATED ORAL at 20:00

## 2021-12-17 RX ADMIN — INSULIN LISPRO 5 UNITS: 100 INJECTION, SOLUTION INTRAVENOUS; SUBCUTANEOUS at 08:55

## 2021-12-17 RX ADMIN — LINAGLIPTIN 5 MG: 5 TABLET, FILM COATED ORAL at 08:54

## 2021-12-17 RX ADMIN — TAMSULOSIN HYDROCHLORIDE 0.4 MG: 0.4 CAPSULE ORAL at 08:55

## 2021-12-17 RX ADMIN — METOPROLOL TARTRATE 25 MG: 25 TABLET, FILM COATED ORAL at 08:54

## 2021-12-17 RX ADMIN — HEPARIN SODIUM 5000 UNITS: 5000 INJECTION INTRAVENOUS; SUBCUTANEOUS at 06:09

## 2021-12-17 RX ADMIN — TORSEMIDE 60 MG: 20 TABLET ORAL at 08:54

## 2021-12-17 RX ADMIN — OLANZAPINE 5 MG: 5 TABLET ORAL at 08:54

## 2021-12-17 RX ADMIN — METOPROLOL TARTRATE 25 MG: 25 TABLET, FILM COATED ORAL at 20:00

## 2021-12-17 RX ADMIN — ASPIRIN 81 MG: 81 TABLET, CHEWABLE ORAL at 08:55

## 2021-12-17 RX ADMIN — NYSTATIN 500000 UNITS: 100000 SUSPENSION ORAL at 08:54

## 2021-12-17 RX ADMIN — SODIUM CHLORIDE, PRESERVATIVE FREE 10 ML: 5 INJECTION INTRAVENOUS at 08:55

## 2021-12-17 RX ADMIN — NYSTATIN 500000 UNITS: 100000 SUSPENSION ORAL at 17:23

## 2021-12-17 RX ADMIN — LEVOTHYROXINE SODIUM 125 MCG: 0.12 TABLET ORAL at 06:09

## 2021-12-17 RX ADMIN — INSULIN LISPRO 2 UNITS: 100 INJECTION, SOLUTION INTRAVENOUS; SUBCUTANEOUS at 17:22

## 2021-12-17 RX ADMIN — PANTOPRAZOLE SODIUM 40 MG: 40 TABLET, DELAYED RELEASE ORAL at 08:54

## 2021-12-17 RX ADMIN — INSULIN LISPRO 2 UNITS: 100 INJECTION, SOLUTION INTRAVENOUS; SUBCUTANEOUS at 11:42

## 2021-12-17 RX ADMIN — INSULIN LISPRO 2 UNITS: 100 INJECTION, SOLUTION INTRAVENOUS; SUBCUTANEOUS at 17:23

## 2021-12-17 RX ADMIN — MULTIPLE VITAMINS W/ MINERALS TAB 1 TABLET: TAB at 08:54

## 2021-12-17 RX ADMIN — Medication 250 MG: at 08:54

## 2021-12-17 RX ADMIN — ACETAMINOPHEN 650 MG: 325 TABLET, FILM COATED ORAL at 14:24

## 2021-12-17 RX ADMIN — FOLIC ACID 1 MG: 1 TABLET ORAL at 08:54

## 2021-12-17 RX ADMIN — NYSTATIN 500000 UNITS: 100000 SUSPENSION ORAL at 11:42

## 2021-12-17 RX ADMIN — AMLODIPINE BESYLATE 10 MG: 5 TABLET ORAL at 08:59

## 2021-12-17 RX ADMIN — INSULIN GLARGINE 8 UNITS: 100 INJECTION, SOLUTION SUBCUTANEOUS at 06:09

## 2021-12-17 RX ADMIN — HEPARIN SODIUM 5000 UNITS: 5000 INJECTION INTRAVENOUS; SUBCUTANEOUS at 13:46

## 2021-12-17 NOTE — DISCHARGE SUMMARY
Patient Name: Zaina Martinez  : 1965  MRN: [id number]    Date of Admission: 2021  Date of Discharge:  2021  Primary Care Physician: Karson Oreilly MD      Chief Complaint:   Altered Mental Status      Discharge Diagnoses     Active Hospital Problems    Diagnosis  POA   • **MAYUR (acute kidney injury) (HCC) [N17.9]  Yes   • Superficial thrombophlebitis [I80.9]  Clinically Undetermined   • Urine retention [R33.9]  Yes   • Klebsiella infection [A49.8]  Yes   • PRES (posterior reversible encephalopathy syndrome) [I67.83]  Yes   • Enteritis [K52.9]  Clinically Undetermined   • Medically noncompliant [Z91.19]  Not Applicable   • Myocardial infarction due to demand ischemia (HCC) [I21.A1]  Yes   • Angioedema [T78.3XXA]  Yes   • Anemia [D64.9]  Yes   • HTN (hypertension) [I10]  Yes   • Hypothyroidism [E03.9]  Yes   • Type 2 diabetes mellitus with hyperglycemia, with long-term current use of insulin (HCC) [E11.65, Z79.4]  Not Applicable   • Rheumatoid arthritis (HCC) [M06.9]  Yes      Resolved Hospital Problems    Diagnosis Date Resolved POA   • Hypertensive urgency [I16.0] 2021 Yes        Hospital Course     Patient is 56-year-old female with known history of hypertension, diabetes mellitus, hypothyroidism is being admitted to the hospital with complaints of facial swelling. She reported in the ED that he went to work and when he returned, her facial swelling had worsened and her blood pressure was high.  On admission her blood pressure was 200/100s.  Patient diagnosed with MAYUR on CKD, hypertensive urgency, and was found to have profound hypothyroidism with TSH being 152 on admission.  Patient also developed mental status changes was seen by neurology and no evidence of stroke.  Mental status changes were thought to be multifactorial and secondary to posterior reversible encephalopathy syndrome.  Was initially treated with IV antihypertensives and switched to p.o. medications with improvement in  blood pressures.  Her encephalopathy resolved and she was back to her baseline.    MAYUR on CKD stage IV.    Now likely ESRD.  Nephrology was consulted.  Patient was started on dialysis as kidney function did not improve . Outpatient dialysis was set up and patient suffered next scheduled dialysis on Monday 12/20/2021.  Patient to follow-up with vascular surgery in 6 to 8 weeks to discuss possible shunt placement for dialysis.     Diabetes mellitus type 2 with long-term use of insulin.  Blood glucose not well controlled.  Hemoglobin A1c elevated at 9.78.  Discharged home on linagliptin 5 mg daily,  Lantus 10 units every morning, and lispro 3 units with meals.  Discussed to monitor blood glucose levels times a day.  Follow-up with PCP in 1 week and adjust as indicated depending on the blood glucose levels.       Hypothyroidism:   -TSH is severely elevated at 152.  Patient was on 175 mcg daily at home however patient must be noncompliant with thyroid medicine.  Synthroid adjusted.  Discharged home on levothyroxine 125 mg daily needs to have repeat free T4 and TSH within 1 month to continue to monitor and adjust as indicated.  Discussed with patient but it is very important medication and it is important not to miss, and she needs to continue to take it daily.    Psychosis, possible undiagnosed bipolar syndrome with acute psychotic manic phase: feels patient has undiagnosed bipolar and has had previous episode of psychosis at her last hospitalization when she had COVID-19.  Psychiatry was consulted.  Patient was started on Zyprexa 5mg bid which was continued at discharge.  Follow-up with psychiatry in 2 weeks.     At the time of discharge patient was told to take all medications as prescribed, keep all follow-up appointments, and call their doctor or return to the hospital with any worsening or concerning symptoms.         Much of this encounter note is done through dragon dictation. The dictation may permit  erroneous, or at times, nonsensical words or phrases; Although I have reviewed the note for such errors, some may still exist.                 Day of Discharge     Subjective:  Patient seen this morning.  Feels tired but better compared to yesterday after blood transfusion.  She did receive 1 unit of PRBCs yesterday, hemoglobin was 8.0 this morning.  Denies chest pain, shortness of breath, fevers, chills.    Physical Exam:  Temp:  [98 °F (36.7 °C)-98.8 °F (37.1 °C)] 98.8 °F (37.1 °C)  Heart Rate:  [63-74] 68  Resp:  [18-20] 18  BP: (106-167)/(61-80) 129/79  Body mass index is 28.17 kg/m².  Physical Exam  Constitutional:Awake, alert  HENT: NCAT, mucous membranes moist, neck supple  Respiratory: Clear to auscultation bilaterally, respiratory effort normal, nonlabored breathing   Cardiovascular: RRR, normal radial pulses  Gastrointestinal: Positive bowel sounds, soft, nontender, nondistended  Musculoskeletal: Normal musculature for age, minimal lower extremity edema  Neurologic:  Oriented,  Moving all extremities,follows commands  Skin: No rashes or jaundice, warm  Consultants     Consult Orders (all) (From admission, onward)     Start     Ordered    12/09/21 1157  Inpatient Psychiatrist Consult  Once        Specialty:  Psychiatry  Provider:  Yoradn Suero III, MD    12/09/21 1157    12/08/21 1200  Inpatient Neurology Consult General  Once        Specialty:  Neurology  Provider:  Jaswant Lester MD    12/08/21 1200    12/05/21 1443  Inpatient Vascular Surgery Consult  Once        Specialty:  Vascular Surgery  Provider:  Mat Cummings MD    12/05/21 1443    12/03/21 1450  Inpatient Gastroenterology Consult  Once        Specialty:  Gastroenterology  Provider:  Lucas Velasquez MD    12/03/21 1457    12/01/21 1604  Inpatient Neurology Consult General  Once        Specialty:  Neurology  Provider:  Adonis Fabian MD    12/01/21 1604    11/30/21 1326  Inpatient Consult to Advance Care Planning  Once         Provider:  (Not yet assigned)    11/30/21 1326    11/30/21 1109  Inpatient Urology Consult  Once        Specialty:  Urology  Provider:  Adonis Dyson Jr., MD    11/30/21 1110    11/30/21 0113  Inpatient Cardiology Consult  Once        Specialty:  Cardiology  Provider:  Renea Brownlee MD    11/30/21 0115    11/30/21 0113  Inpatient Nephrology Consult  Once        Specialty:  Nephrology  Provider:  Alejo Lange MD    11/30/21 0115 11/29/21 2226  [facility] (on-call MD unless specified) Details  Once,   Status:  Canceled        Specialty:  Hospitalist  Provider:  (Not yet assigned)    11/29/21 2225              Procedures     HEMODIALYSIS CATHETER INSERTION      Imaging Results (All)     Procedure Component Value Units Date/Time    XR Chest 1 View [[id number]] Collected: 12/11/21 1313     Updated: 12/11/21 1317    Narrative:      PORTABLE CHEST 12/11/2021 AT 12:40 PM     CLINICAL HISTORY: Confusion. Leukocytosis. Intermittent cough.     Compared to a previous chest dated 12/06/2021.     Right internal jugular dialysis catheter remains in place in  satisfactory position without change. The lungs are better inflated than  on the previous chest x-ray. There remain free of focal infiltrates. A  tiny left pleural effusion is suspected. The heart is normal in size.  The pulmonary vasculature limits.     This report was finalized on 12/11/2021 1:14 PM by Dr. Darien Barron M.D.       XR Chest Post CVA Port [[id number]] Collected: 12/06/21 1420     Updated: 12/06/21 1424    Narrative:      XR CHEST POST CVA PORT-     Clinical: Central venous catheter placement     COMPARISON 11/29/2021     FINDINGS: Newly placed central venous catheter tip superimposes the  right atrium. No pneumothorax. The lungs are clear. No effusion or  edema. Cardiomediastinal silhouette is stable.     CONCLUSION: Central venous catheter position is satisfactory, no  pneumothorax.     This report was finalized on 12/6/2021 2:21 PM by   Vazquez Pollard M.D.       Arteriogram (Autofinalize) [[id number]] Resulted: 12/06/21 1347     Updated: 12/06/21 1347    Narrative:      This procedure was auto-finalized with no dictation required.    MRI Brain With & Without Contrast [[id number]] Collected: 12/02/21 1623     Updated: 12/03/21 1103    Narrative:      MRI EXAMINATION OF THE BRAIN WITH AND WITHOUT CONTRAST     HISTORY: Mental status changes.     COMPARISON: None.     TECHNIQUE: A MRI examination of the brain was performed utilizing  sagittal T1, axial diffusion, T1, T2, T2 FLAIR and gradient echo T2  imaging. The patient returned to the department for sagittal, axial and  coronal T1 postcontrast weighted sequences.     FINDINGS: The study is hampered by patient motion.     There is no evidence of restricted diffusion to suggest acute  infarction. Areas of T2 FLAIR hyperintensity are appreciated involving  the periventricular white matter of the cerebral hemispheres bilaterally  most prominent in the parietal occipital regions, symmetrical. There is  mild increased signal intensity involving the deep white matter of the  cerebral hemispheres bilaterally.     Small foci of T2 FLAIR hyperintensity are appreciated involving the left  cerebellar hemisphere inferiorly and to a lesser extent medially on the  T2 FLAIR sequence. This is nonspecific. After contrast administration  there was no evidence of abnormal supratentorial enhancement. Pulsation  artifact limits evaluation of the posterior fossa but no convincing  enhancement involving the left cerebellar hemisphere was appreciated.       Impression:      The study was hampered by patient motion. No evidence of  acute infarction. Areas of T2 FLAIR hyperintensity involving the  periventricular and to a lesser extent deep white matter of the cerebral  hemispheres bilaterally. Small foci of increased signal intensity  involving the white matter of the left cerebellar hemisphere was also  appreciated. There  was no evidence of abnormal enhancement. The  appearance is nonspecific. The areas of T2 FLAIR hyperintensity  supratentorially may represent small vessel ischemic disease. The  cerebellar areas of T2 FLAIR hyperintensity are nonspecific. This may  relate to PRES. Clinical correlation and a follow-up MRI examination the  brain with and without contrast is suggested. There is no evidence of  abnormal enhancement or hemosiderosis.     This report was finalized on 12/3/2021 11:00 AM by Dr. Osmar Nelson M.D.       CT Abdomen Pelvis Without Contrast [[id number]] Collected: 12/02/21 0842     Updated: 12/02/21 0857    Narrative:      CT ABDOMEN AND PELVIS WITHOUT IV CONTRAST     HISTORY: Hydronephrosis     TECHNIQUE: Radiation dose reduction techniques were utilized, including  automated exposure control and exposure modulation based on body size.  Axial images were obtained through the abdomen and pelvis without the  administration of IV contrast. Coronal and sagittal reformatted images  were obtained.     COMPARISON: Renal ultrasound 11/30/2021     FINDINGS:      ABDOMEN:  Mild scarring/atelectasis in the lung bases. Cholelithiasis. The liver  and spleen are unremarkable. Minimal left hydronephrosis is seen. There  is no obvious obstructing stone or lesion. No hydronephrosis on the  right. Renal vascular calcifications are present. The adrenal glands and  pancreas are unremarkable. There is some edema in the mesentery. Only  extremely minimal ascites is seen in the right paracolic gutter.  Anasarca. There are multiple thickened loops of small bowel present  within the mid and left abdomen, suggesting enteritis. No evidence for  bowel obstruction.     PELVIS:  There is diffuse thickening of the bladder, nonspecific. Minimal ascites  in the pelvis. Colon is unremarkable. No acute bony abnormality.       Impression:      1. Minimal left hydronephrosis without obvious obstruction. Bladder wall  is diffusely thickened,  which may be the cause of the minimal  hydronephrosis  2. Multiple thickened small bowel loops within the mid and left abdomen  suggesting enteritis. No evidence for bowel obstruction  3. Edema within the mesentery and very minimal ascites. Diffuse  anasarca.  4. Cholelithiasis     Radiation dose reduction techniques were utilized, including automated  exposure control and exposure modulation based on body size.     This report was finalized on 12/2/2021 8:54 AM by Dr. Bijan Ordñoez M.D.       US Renal Bilateral [[id number]] Collected: 11/30/21 0825     Updated: 11/30/21 0834    Narrative:      BILATERAL RENAL ULTRASOUND     CLINICAL HISTORY: Acute renal insufficiency. Hypertension.     The right kidney is normal in size and shape and shows no hydronephrosis  or masses. It measures 10.3 x 5.9 x 3.9 cm. The left kidney is also  normal in size and shape. It demonstrates mild left hydronephrosis. The  left kidney measures 9.9 x 5.1 x 4.5 cm. Images of the urinary bladder  demonstrate no obvious bladder masses. Doppler ultrasound shows evidence  of urine entering the bladder from the right ureteral orifice. No  definite urine was identified entering the bladder from the left  ureteral orifice. This supports a diagnosis of left ureteral  obstruction. Correlation with clinical findings is recommended. A CT  scan of the abdomen and pelvis may be helpful for further evaluation.     IMPRESSIONS: Findings consistent with left obstructive uropathy as  described. A CT scan of the abdomen and pelvis is suggested for further  evaluation.     This report was finalized on 11/30/2021 8:31 AM by Dr. Darien Barron M.D.       XR Chest 1 View [[id number]] Collected: 11/29/21 2154     Updated: 11/29/21 2158    Narrative:      SINGLE VIEW OF THE CHEST     HISTORY: Elevated blood-pressure shortness of breath     COMPARISON: None available.     FINDINGS:  Heart size is within normal limits. Lungs appear clear. No pneumothorax,  pleural  effusion, or acute infiltrate is seen.       Impression:      No acute findings.     This report was finalized on 11/29/2021 9:55 PM by Dr. Cherri Rodriguez M.D.           Results for orders placed during the hospital encounter of 11/29/21    Duplex Initial Vein Mapping Hemodialysis Access Bilateral CAR    Interpretation Summary  · On the right, the patient has acute superficial thrombophlebitis of the cephalic vein in the distal upper arm and antecubital fossa. Basilic vein is inadequate.  · On the left, the patient has acute superficial thrombophlebitis in the cephalic vein at the antecubital fossa. Basilic vein is inadequate.  · Arterial anatomy on the right is abnormal with a high bifurcation of the brachial in the mid upper arm.    Results for orders placed during the hospital encounter of 11/29/21    Adult Transthoracic Echo Complete W/ Cont if Necessary Per Protocol    Interpretation Summary  · Calculated left ventricular EF = 61.8% Estimated left ventricular EF was in agreement with the calculated left ventricular EF. Left ventricular systolic function is normal.  · Left ventricular diastolic function is consistent with (grade II w/high LAP) pseudonormalization.  · The left atrial cavity is mildly dilated.  · Trace tricuspid valve regurgitation is present. Calculated right ventricular systolic pressure from tricuspid regurgitation is 21.4 mmHg.    Pertinent Labs     Results from last 7 days   Lab Units 12/17/21  0438 12/16/21  0800 12/16/21  0529 12/15/21  0657 12/14/21  0752 12/14/21  0752   WBC 10*3/mm3 13.27*  --  12.79* 11.49*  --  10.64   HEMOGLOBIN g/dL 8.0* 6.8* 6.4* 7.7*   < > 7.3*   PLATELETS 10*3/mm3 178  --  192 218  --  231    < > = values in this interval not displayed.     Results from last 7 days   Lab Units 12/17/21  1307 12/16/21  0529 12/15/21  0657 12/14/21  0752   SODIUM mmol/L 137 137 138 138   POTASSIUM mmol/L 4.2 4.5 4.0 4.3   CHLORIDE mmol/L 101 102 103 103   CO2 mmol/L 27.8 24.2  27.3 24.8   BUN mg/dL 32* 39* 25* 41*   CREATININE mg/dL 2.60* 2.93* 2.58* 3.48*   GLUCOSE mg/dL 73 354* 244* 156*   Estimated Creatinine Clearance: 22.1 mL/min (A) (by C-G formula based on SCr of 2.6 mg/dL (H)).  Results from last 7 days   Lab Units 12/16/21  0529 12/15/21  0657 12/14/21  0752 12/13/21  0627   ALBUMIN g/dL 1.70* 2.00* 1.80* 1.60*     Results from last 7 days   Lab Units 12/17/21  1307 12/16/21  0529 12/15/21  0657 12/14/21  0752 12/13/21  0627 12/13/21  0627   CALCIUM mg/dL 7.7* 7.2* 7.3* 7.5*   < > 7.4*   ALBUMIN g/dL  --  1.70* 2.00* 1.80*  --  1.60*   PHOSPHORUS mg/dL  --  3.3 3.1 3.1  --  2.7    < > = values in this interval not displayed.               Invalid input(s): LDLCALC  Results from last 7 days   Lab Units 12/11/21  1529 12/11/21  1237 12/11/21  1236   BLOODCX   --  No growth at 5 days Staphylococcus, coagulase negative*   URINECX  No growth  --   --    BCIDPCR   --   --  Staph spp, not aureus or lugdunesis. Identification by BCID2 PCR.*         Test Results Pending at Discharge         Discharge Details        Discharge Medications      New Medications      Instructions Start Date   amLODIPine 10 MG tablet  Commonly known as: NORVASC   10 mg, Oral, Every 24 Hours Scheduled   Start Date: December 18, 2021     folic acid 1 MG tablet  Commonly known as: FOLVITE   1 mg, Oral, Daily   Start Date: December 18, 2021     insulin glargine 100 UNIT/ML injection  Commonly known as: LANTUS, SEMGLEE   10 Units, Subcutaneous, Every Morning   Start Date: December 18, 2021     linagliptin 5 MG tablet tablet  Commonly known as: TRADJENTA   5 mg, Oral, Daily   Start Date: December 18, 2021     metoprolol tartrate 25 MG tablet  Commonly known as: LOPRESSOR   25 mg, Oral, Every 12 Hours Scheduled      multivitamin with minerals tablet tablet   1 tablet, Oral, Daily   Start Date: December 18, 2021     OLANZapine 5 MG tablet  Commonly known as: zyPREXA   5 mg, Oral, 2 Times Daily      tamsulosin 0.4 MG  capsule 24 hr capsule  Commonly known as: FLOMAX   0.4 mg, Oral, Daily   Start Date: December 18, 2021     torsemide 100 MG tablet  Commonly known as: DEMADEX   100 mg, Oral, Daily, DO NOT TAKE ON DIALYSIS DAYS.   Start Date: December 18, 2021        Changes to Medications      Instructions Start Date   insulin lispro 100 UNIT/ML injection  Commonly known as: humaLOG  What changed: how much to take   3 Units, Subcutaneous, 3 Times Daily Before Meals      levothyroxine 125 MCG tablet  Commonly known as: SYNTHROID, LEVOTHROID  What changed:   medication strength  how much to take   125 mcg, Oral, Daily      sertraline 100 MG tablet  Commonly known as: ZOLOFT  What changed: how much to take   50 mg, Oral, Daily         Continue These Medications      Instructions Start Date   Accu-Chek Liz Plus test strip  Generic drug: glucose blood   4 Times Daily      Accu-Chek Guide test strip  Generic drug: glucose blood   1 each, Other, 4 Times Daily      aspirin 81 MG chewable tablet   81 mg, Oral, Daily      pantoprazole 40 MG EC tablet  Commonly known as: PROTONIX   40 mg, Oral, Daily      rOPINIRole 0.25 MG tablet  Commonly known as: REQUIP   3 tablets, Oral, Every Night at Bedtime         Stop These Medications    losartan 50 MG tablet  Commonly known as: COZAAR     temazepam 7.5 MG capsule  Commonly known as: RESTORIL            No Known Allergies    Discharge Disposition:  Home-Health Care [facility]      Discharge Diet:  Diet Order   Procedures   • Diet Regular       Discharge Activity:       CODE STATUS:    Code Status and Medical Interventions:   Ordered at: 11/30/21 0115     Code Status (Patient has no pulse and is not breathing):    CPR (Attempt to Resuscitate)     Medical Interventions (Patient has pulse or is breathing):    Full Support       Future Appointments   Date Time Provider Department Center   3/16/2022  9:30 AM John Tovar MD [facility]     Additional Instructions for the Follow-ups that You  Need to Schedule     Ambulatory Referral to Home Health   As directed      Face to Face Visit Date: 12/17/2021    Follow-up provider for Plan of Care?: I treated the patient in an acute care facility and will not continue treatment after discharge.    Follow-up provider: LIZZY OREILLY [[id number]]    Reason/Clinical Findings: generalized weakness. Initiated on dialysis this admission.    Describe mobility limitations that make leaving home difficult: generilized weakness    Nursing/Therapeutic Services Requested: Physical Therapy Skilled Nursing    Skilled nursing orders: PICC line care/instruction    PT orders: Therapeutic exercise Gait Training Strengthening Transfer training    Weight Bearing Status: As Tolerated    Frequency: 1 Week 1            Contact information for follow-up providers     Mat Cummings MD Follow up in 2 month(s).    Specialty: Vascular Surgery  Why: Follow-up to discuss possible shunt placement for dialysis.   Contact information:  [address]  [address]  [facility] [address]  [phone]             Lizzy Oreilly MD Follow up in 5 day(s).    Specialty: Family Medicine  Contact information:  [address] [facility] [address]  [facility] [address]  [phone]             Frederic Powell MD Follow up.    Specialty: Nephrology  Contact information:  [address]  [address]  [facility] [address]  [phone]                   Contact information for after-discharge care     Durable Medical Equipment     [facility] .    Service: Durable Medical Equipment  Contact information:  [address]  [facility] [address]  [phone]                 Dialysis/Infusion     [facility]    Service: Dialysis  Contact information:  [address] [facility] [address] [phone]                             [unfilled]Time Spent on Discharge:  Greater than 30 minutes      Albert Templeton MD  [facility]  12/17/21  13:16 EST

## 2021-12-17 NOTE — PROGRESS NOTES
"   LOS: 18 days    Patient Care Team:  Provider, No Known as PCP - General    Chief Complaint:    Chief Complaint   Patient presents with   • Altered Mental Status     Follow UP MAYUR CKD4  Subjective     Interval History:   Dialyzed yesterday 2L removed  Not very interactive     Objective     Vital Signs  Temp:  [98 °F (36.7 °C)-98.5 °F (36.9 °C)] 98 °F (36.7 °C)  Heart Rate:  [63-74] 63  Resp:  [14-20] 18  BP: (106-170)/(53-82) 167/80    Flowsheet Rows      First Filed Value   Admission Height 157.5 cm (62\") Documented at 11/29/2021 2011   Admission Weight 59 kg (130 lb) Documented at 11/30/2021 0200          No intake/output data recorded.  I/O last 3 completed shifts:  In: 1160 [P.O.:860; Blood:300]  Out: 2201 [Urine:200; Emesis/NG output:1; Other:2000]    Intake/Output Summary (Last 24 hours) at 12/17/2021 0802  Last data filed at 12/17/2021 0449  Gross per 24 hour   Intake 620 ml   Output 2201 ml   Net -1581 ml       Physical Exam:  General Appearance: frail WF resting no distress  Neck IJ TDC no JVD  Lungs CTA bilat no rales  CV RRR no m/g  abd soft NT/ND  vasc trace BLE pedal/ankle edema      Results Review:    Results from last 7 days   Lab Units 12/16/21  0529 12/15/21  0657 12/14/21  0752   SODIUM mmol/L 137 138 138   POTASSIUM mmol/L 4.5 4.0 4.3   CHLORIDE mmol/L 102 103 103   CO2 mmol/L 24.2 27.3 24.8   BUN mg/dL 39* 25* 41*   CREATININE mg/dL 2.93* 2.58* 3.48*   CALCIUM mg/dL 7.2* 7.3* 7.5*   GLUCOSE mg/dL 354* 244* 156*       Estimated Creatinine Clearance: 19.6 mL/min (A) (by C-G formula based on SCr of 2.93 mg/dL (H)).    Results from last 7 days   Lab Units 12/16/21  0529 12/15/21  0657 12/14/21  0752   PHOSPHORUS mg/dL 3.3 3.1 3.1             Results from last 7 days   Lab Units 12/17/21  0438 12/16/21  0800 12/16/21  0529 12/15/21  0657 12/14/21  0752 12/13/21  1229 12/13/21  0627   WBC 10*3/mm3 13.27*  --  12.79* 11.49* 10.64  --  9.59   HEMOGLOBIN g/dL 8.0* 6.8* 6.4* 7.7* 7.3*   < > 6.7* "   PLATELETS 10*3/mm3 178  --  192 218 231  --  202    < > = values in this interval not displayed.               Imaging Results (Last 24 Hours)     ** No results found for the last 24 hours. **        amLODIPine, 5 mg, Oral, Q24H  aspirin, 81 mg, Oral, Daily  folic acid, 1 mg, Oral, Daily  heparin (porcine), 5,000 Units, Subcutaneous, Q8H  insulin glargine, 8 Units, Subcutaneous, QAM  insulin lispro, 0-7 Units, Subcutaneous, TID AC  insulin lispro, 2 Units, Subcutaneous, TID With Meals  levothyroxine, 125 mcg, Oral, Q AM  linagliptin, 5 mg, Oral, Daily  melatonin, 5 mg, Oral, Nightly  metoprolol tartrate, 25 mg, Oral, Q12H  multivitamin with minerals, 1 tablet, Oral, Daily  nystatin, 5 mL, Swish & Swallow, 4x Daily  OLANZapine, 5 mg, Oral, BID  pantoprazole, 40 mg, Oral, Daily  rOPINIRole, 0.75 mg, Oral, Nightly  sodium chloride, 10 mL, Intravenous, Q12H  tamsulosin, 0.4 mg, Oral, Daily  thiamine, 250 mg, Oral, Daily  torsemide, 60 mg, Oral, Daily      sodium chloride, 9 mL/hr, Last Rate: 9 mL/hr (12/06/21 1210)        Medication Review:   Current Facility-Administered Medications   Medication Dose Route Frequency Provider Last Rate Last Admin   • acetaminophen (TYLENOL) tablet 650 mg  650 mg Oral Q4H PRN Keli Salazar MD   650 mg at 12/16/21 1035   • amLODIPine (NORVASC) tablet 5 mg  5 mg Oral Q24H Albert Templeton MD   5 mg at 12/16/21 1407   • aspirin chewable tablet 81 mg  81 mg Oral Daily Keli Salazar MD   81 mg at 12/16/21 1416   • dextrose (D50W) (25 g/50 mL) IV injection 25 g  25 g Intravenous Q15 Min PRN Keli Salazar MD   25 g at 12/08/21 1223   • dextrose (GLUTOSE) oral gel 15 g  15 g Oral Q15 Min PRN Keli Salazar MD   15 g at 12/08/21 1502   • folic acid (FOLVITE) tablet 1 mg  1 mg Oral Daily Keli Salazar MD   1 mg at 12/16/21 1407   • glucagon (human recombinant) (GLUCAGEN DIAGNOSTIC) injection 1 mg  1 mg Subcutaneous PRN Keli Salazar MD       • heparin  (porcine) 5000 UNIT/ML injection 5,000 Units  5,000 Units Subcutaneous Q8H Osmar Alva MD   5,000 Units at 12/17/21 0609   • heparin (porcine) injection 3,800 Units  3,800 Units Intracatheter PRN Alejo Lange MD   3,800 Units at 12/16/21 1308   • insulin glargine (LANTUS, SEMGLEE) injection 8 Units  8 Units Subcutaneous Albert Stewart MD   8 Units at 12/17/21 0609   • insulin lispro (ADMELOG) injection 0-7 Units  0-7 Units Subcutaneous TID AC Keli Salazar MD   2 Units at 12/16/21 1747   • insulin lispro (ADMELOG) injection 2 Units  2 Units Subcutaneous TID With Meals Osmar Alva MD   2 Units at 12/16/21 1747   • labetalol (NORMODYNE,TRANDATE) injection 10 mg  10 mg Intravenous Q6H PRN Keli Salazar MD   10 mg at 12/08/21 1246   • levothyroxine (SYNTHROID, LEVOTHROID) tablet 125 mcg  125 mcg Oral Q AM Osmar Alva MD   125 mcg at 12/17/21 0609   • linagliptin (TRADJENTA) tablet 5 mg  5 mg Oral Daily Keli Salazar MD   5 mg at 12/16/21 1406   • loperamide (IMODIUM) capsule 2 mg  2 mg Oral 4x Daily PRN Keli Salaazr MD   2 mg at 12/13/21 1455   • melatonin tablet 5 mg  5 mg Oral Nightly Osmar Alva MD   5 mg at 12/16/21 2037   • metoprolol tartrate (LOPRESSOR) tablet 25 mg  25 mg Oral Q12H Keli Salazar MD   25 mg at 12/16/21 2037   • multivitamin with minerals 1 tablet  1 tablet Oral Daily Osmar Alva MD   1 tablet at 12/16/21 1406   • nitroglycerin (NITROSTAT) SL tablet 0.4 mg  0.4 mg Sublingual Q5 Min PRN Keli Salazar MD       • nystatin (MYCOSTATIN) 100,000 unit/mL suspension 500,000 Units  5 mL Swish & Swallow 4x Daily Osmar Alva MD   500,000 Units at 12/16/21 2037   • OLANZapine (zyPREXA) injection 5 mg  5 mg Intramuscular Q8H PRN Yordan Suero III, MD   5 mg at 12/10/21 1851   • OLANZapine (zyPREXA) tablet 5 mg  5 mg Oral BID Osmar Alva MD   5  mg at 12/16/21 2037   • pantoprazole (PROTONIX) EC tablet 40 mg  40 mg Oral Daily Keli Salazar MD   40 mg at 12/16/21 1408   • prochlorperazine (COMPAZINE) injection 5 mg  5 mg Intravenous Q6H PRN Keli Salazar MD       • rOPINIRole (REQUIP) tablet 0.75 mg  0.75 mg Oral Nightly Keli Salazar MD   0.75 mg at 12/16/21 2037   • sodium chloride 0.9 % flush 10 mL  10 mL Intravenous Q12H Keli Salazar MD   10 mL at 12/16/21 2037   • sodium chloride 0.9 % flush 10 mL  10 mL Intravenous PRN Keli Salazar MD       • sodium chloride 0.9 % infusion  9 mL/hr Intravenous Continuous PRN Keli Salazar MD 9 mL/hr at 12/06/21 1210 9 mL/hr at 12/06/21 1210   • tamsulosin (FLOMAX) 24 hr capsule 0.4 mg  0.4 mg Oral Daily Osmar Alva MD   0.4 mg at 12/16/21 1407   • thiamine (VITAMIN B-1) tablet 250 mg  250 mg Oral Daily Osmar Alva MD   250 mg at 12/16/21 1406   • torsemide (DEMADEX) tablet 60 mg  60 mg Oral Daily Ruth Lira MD   60 mg at 12/16/21 1407       Assessment/Plan   1.  ESRD - declared (MAYUR on CKD stage 4, with Cr 2.1 in Aug 2021), remains dialysis-dependent.  Dialyzed yesterday.  Outpt spot being arranged .  K/HCo3 and waste products stable.  Vol status adequate, 2L removed   2.  Confusion.  Multifactorial secondary possible to metabolic encephalopathy in addition to PRES. Improving.   3.  DM2 with poor control. On Tradjenta. L[facility] managing.   4.  Anemia secondary to iron deficiency anemia/CKD.  Transfused yesterday, hgb up to 8.  Continue IV iron on HD  5.  Hypertension. BP remains very labile, 160s systolic this AM.  6.  Type II non-NSTEMI.  7.  Severe hypothyroidism.  On replacement.  8.  Medication noncompliance.  9.  Diarrhea, resolved   10.PCM, severe, albumin 1.7    Plan  - inc norvasc 10  - HD tomorrow  - finalize outpatient chair, CCP following  - once arranged no objection to discharge and could continue torsemide on non-dialysis days  since has some residual fcn  - d/w RN      MAYUR (acute kidney injury) (HCC)    HTN (hypertension)    Hypothyroidism    Type 2 diabetes mellitus with hyperglycemia, with long-term current use of insulin (HCC)    Rheumatoid arthritis (HCC)    Angioedema    Anemia    Medically noncompliant    Myocardial infarction due to demand ischemia (HCC)    Enteritis    PRES (posterior reversible encephalopathy syndrome)    Urine retention    Klebsiella infection    Superficial thrombophlebitis              Frederic Powell MD  12/17/21  08:02 EST

## 2021-12-17 NOTE — CASE MANAGEMENT/SOCIAL WORK
Case Management Discharge Note      Final Note: Pt walked 100 ft with PT and click score 19. Pt does not qualify for SNF and will need to return home with HH. HH Referrals placed. Spoke with [facility] and pt has HD chair setup MWF at 1615. Called and spoke to Dr Powell regarding HD sat (she was on TTHS schedule here). Per Dr Powell, Ok to not get HD sat and start HD Mon at clinic and will call in HD orders to clinic for Monday. Also stated to inform pt to limit liquid intake over weekend until she is dialyzed Mon. Information placed on AVS. Notified Attending and D/C order noted.    Provided Post Acute Provider List?: Refused  Refused Provider List Comment: Denied need at this time  Provided Post Acute Provider Quality & Resource List?: Refused  Refused Quality and Resource List Comment: Denied need at this time    Selected Continued Care - Admitted Since 11/29/2021     Destination    No services have been selected for the patient.              Durable Medical Equipment Coordination complete.    Service Provider Selected Services Address Phone Fax Patient Preferred    [facility]  Durable Medical Equipment [id number] [address] [phone] --          Dialysis/Infusion Coordination complete.    Service Provider Selected Services Address Phone Fax Patient Preferred    [facility] [address]  Dialysis [address] [address] [phone] --          Home Medical Care    No services have been selected for the patient.              Therapy    No services have been selected for the patient.              Community Resources    No services have been selected for the patient.              Community & DME    No services have been selected for the patient.                  Transportation Services  Private: Car    Final Discharge Disposition Code: 01 - home or self-care

## 2021-12-17 NOTE — PLAN OF CARE
Goal Outcome Evaluation:  Plan of Care Reviewed With: patient        Progress: improving  Outcome Summary: Patient demonstrates improvement in stability with ambulation and with activity tolerance, able to perform bed mobility with SBA and transfers with CGA, ambulated in hallway with rwx and CGA, performed LE strengthening ex in sitting.Patient was intermittently wearing a face mask during this therapy encounter. Therapist used appropriate personal protective equipment including eye protection, mask, and gloves.  Mask used was standard procedure mask. Appropriate PPE was worn during the entire therapy session. Hand hygiene was completed before and after therapy session. Patient is not in enhanced droplet precautions.

## 2021-12-17 NOTE — PLAN OF CARE
Problem: Adult Inpatient Plan of Care  Goal: Plan of Care Review  Outcome: Met  Flowsheets (Taken 12/17/2021 4927)  Progress: improving  Plan of Care Reviewed With: patient  Outcome Summary: Vitals stable. pt discharged to home with Rx, instructions, and follow ups. Dialysis chair set up as an outpatient for MWF. Dr. Templeton reviewed BMP.

## 2021-12-17 NOTE — DISCHARGE INSTRUCTIONS
Notify your primary care provider if you experience chest pain, difficulty breathing, fevers/chills, nausea or vomiting, bleeding in stool, excessive diarrhea, numbness or weakness or tingling in any part of your body.      Dialysis  Dialysis is a procedure that is done when the kidneys have stopped working properly (kidney failure). It may also be done earlier if it may help improve symptoms. During dialysis, wastes, salt, and extra water are removed from the blood, and the levels of certain minerals in the blood are maintained.  Dialysis is done in sessions which are continued until the kidneys get better. If the kidneys cannot get better, such as in end-stage kidney disease, dialysis is continued for life or until you receive a new kidney from a donor (kidney transplant). There are two types of dialysis: hemodialysis and peritoneal dialysis.  What is hemodialysis?              Hemodialysis is when a machine called a dialyzer is used to filter the blood. Before starting hemodialysis, you will have surgery to create a site where blood can be removed from the body and returned to the body (vascular access). There are three types of vascular accesses:  Arteriovenous fistula. This type of access is created when an artery and a vein (usually in the arm) are connected during surgery. The arteriovenous fistula usually takes 1-6 months to develop after surgery. It may last longer than the other types of vascular accesses and is less likely to become infected or cause blood clots.  Arteriovenous graft. This type of access is created when an artery and a vein in the arm are connected during surgery with a tube. An arteriovenous graft can usually be used within 2-3 weeks of surgery.  A venous catheter. To create this type of access, a thin tube (catheter) is placed in a large vein in your neck, chest, or groin. A venous catheter can be used right away. It is usually used as a temporary access when dialysis needs to begin  immediately.  During hemodialysis, blood leaves your body through your access site. It travels through a tube to the dialyzer, where it is filtered. The blood then returns to your body through another tube.  Hemodialysis is usually done at a hospital or dialysis center three times a week. Visits last about 3-5 hours. With special training, it may also be done at home with the help of another person.  What is peritoneal dialysis?  Peritoneal dialysis is when the thin lining of the abdomen (peritoneum) and a fluid called dialysate are used to filter the blood. Before starting peritoneal dialysis, you will have surgery to place a catheter in your abdomen. The catheter will be used to transfer dialysate to and from your abdomen.  At the start of a session, your abdomen is filled with dialysate. During the session, wastes, salt, and extra water in the blood pass through the peritoneum and into the dialysate. The dialysate is drained from the body at the end of the session. The process of filling and draining the dialysate is called an exchange. Exchanges are repeated until you have used up all the dialysate for the day.  You may do peritoneal dialysis at home or at almost any other location. It is done every day. You may need up to five exchanges a day. Each exchange takes about 30-40 minutes. The amount of time the dialysate is in your body between exchanges is called a dwell. The dwell usually lasts 1.5-3 hours and can vary with each person. You may choose to do exchanges at night while you sleep, using a machine called a cycler.  Which type of dialysis should I choose?  Both types of dialysis have advantages and disadvantages. Talk with your health care provider about which type of dialysis is best for you. Your lifestyle, preferences, and medical condition should be considered. In some cases, only one type of dialysis can be chosen.  Advantages of hemodialysis  It is done less often than peritoneal dialysis.  Someone  else can do the dialysis for you.  If you go to a dialysis center:  Your health care provider can recognize any problems you may be having.  You can interact with others who are having dialysis. This can provide you with emotional support.  Disadvantages of hemodialysis  Hemodialysis may cause cramps and low blood pressure. It may leave you feeling tired on the days you have the treatment.  If you go to a dialysis center, you will need to make weekly appointments and work around the center’s schedule.  You will need to take extra care when traveling. If you usually get treatment in a dialysis center, you will need to arrange to visit a dialysis center near your destination. If you are having treatments at home, you will need to take the dialyzer with you when traveling.  There are more eating restrictions than with peritoneal dialysis.  Advantages of peritoneal dialysis  It is less likely than hemodialysis to cause cramps and low blood pressure.  There are fewer eating restrictions than with hemodialysis.  You may do exchanges on your own wherever you are, including when you travel.  Disadvantages of peritoneal dialysis  It is done more often than hemodialysis.  Doing peritoneal dialysis requires you to have a good use (dexterity) of your hands. You must also be able to lift bags.  You must learn how to make your equipment free of germs (sterilization techniques). You will need to use these techniques every day to prevent infection.  What changes will I need to make to my diet during dialysis?  Both types of dialysis require you to make some changes to your diet. For example, you will need to limit your intake of foods that contain a lot of phosphorus and potassium. You will also need to limit your fluid intake. A diet and nutrition specialist (dietitian) can help you make a meal plan that can help improve your dialysis and your health.  What should I expect when starting dialysis?  Adjusting to the dialysis  treatment, schedule, and diet can take some time. You may need to stop working and may not be able to do some of your normal activities. You may feel anxious or depressed when starting dialysis. Over time, many people feel better overall because of dialysis. You may be able to return to work after making some changes, such as reducing work intensity.  Where to find more information  National Kidney Foundation: www.kidney.org  American Association of Kidney Patients: www.aakp.org  American Kidney Fund: www.kidneyfund.org  Summary  During dialysis, wastes, salt, and extra water are removed from the blood, and the levels of certain minerals in the blood are maintained. There are two types of dialysis: hemodialysis and peritoneal dialysis.  Hemodialysis is when a machine called a dialyzer is used to filter the blood.  Hemodialysis is usually done by a health care provider at a hospital or dialysis center three times a week.  Peritoneal dialysis is when the peritoneum is used as a filter. You may do peritoneal dialysis at home or at almost any other location.  Both types of dialysis have advantages and disadvantages. Talk with your health care provider about which type of dialysis is best for you.  This information is not intended to replace advice given to you by your health care provider. Make sure you discuss any questions you have with your health care provider.  Document Revised: 05/05/2020 Document Reviewed: 02/13/2018  Elsevier Patient Education © 2021 [employer]    Hypothyroidism    Hypothyroidism is when the thyroid gland does not make enough of certain hormones (it is underactive). The thyroid gland is a small gland located in the lower front part of the neck, just in front of the windpipe (trachea). This gland makes hormones that help control how the body uses food for energy (metabolism) as well as how the heart and brain function. These hormones also play a role in keeping your bones strong. When the  thyroid is underactive, it produces too little of the hormones thyroxine (T4) and triiodothyronine (T3).  What are the causes?  This condition may be caused by:  · Hashimoto's disease. This is a disease in which the body's disease-fighting system (immune system) attacks the thyroid gland. This is the most common cause.  · Viral infections.  · Pregnancy.  · Certain medicines.  · Birth defects.  · Past radiation treatments to the head or neck for cancer.  · Past treatment with radioactive iodine.  · Past exposure to radiation in the environment.  · Past surgical removal of part or all of the thyroid.  · Problems with a gland in the center of the brain (pituitary gland).  · Lack of enough iodine in the diet.  What increases the risk?  You are more likely to develop this condition if:  · You are female.  · You have a family history of thyroid conditions.  · You use a medicine called lithium.  · You take medicines that affect the immune system (immunosuppressants).  What are the signs or symptoms?  Symptoms of this condition include:  · Feeling as though you have no energy (lethargy).  · Not being able to tolerate cold.  · Weight gain that is not explained by a change in diet or exercise habits.  · Lack of appetite.  · Dry skin.  · Coarse hair.  · Menstrual irregularity.  · Slowing of thought processes.  · Constipation.  · Sadness or depression.  How is this diagnosed?  This condition may be diagnosed based on:  · Your symptoms, your medical history, and a physical exam.  · Blood tests.  You may also have imaging tests, such as an ultrasound or MRI.  How is this treated?  This condition is treated with medicine that replaces the thyroid hormones that your body does not make. After you begin treatment, it may take several weeks for symptoms to go away.  Follow these instructions at home:  · Take over-the-counter and prescription medicines only as told by your health care provider.  · If you start taking any new medicines,  tell your health care provider.  · Keep all follow-up visits as told by your health care provider. This is important.  ? As your condition improves, your dosage of thyroid hormone medicine may change.  ? You will need to have blood tests regularly so that your health care provider can monitor your condition.  Contact a health care provider if:  · Your symptoms do not get better with treatment.  · You are taking thyroid replacement medicine and you:  ? Sweat a lot.  ? Have tremors.  ? Feel anxious.  ? Lose weight rapidly.  ? Cannot tolerate heat.  ? Have emotional swings.  ? Have diarrhea.  ? Feel weak.  Get help right away if you have:  · Chest pain.  · An irregular heartbeat.  · A rapid heartbeat.  · Difficulty breathing.  Summary  · Hypothyroidism is when the thyroid gland does not make enough of certain hormones (it is underactive).  · When the thyroid is underactive, it produces too little of the hormones thyroxine (T4) and triiodothyronine (T3).  · The most common cause is Hashimoto's disease, a disease in which the body's disease-fighting system (immune system) attacks the thyroid gland. The condition can also be caused by viral infections, medicine, pregnancy, or past radiation treatment to the head or neck.  · Symptoms may include weight gain, dry skin, constipation, feeling as though you do not have energy, and not being able to tolerate cold.  · This condition is treated with medicine to replace the thyroid hormones that your body does not make.  This information is not intended to replace advice given to you by your health care provider. Make sure you discuss any questions you have with your health care provider.  Document Revised: 11/30/2018 Document Reviewed: 11/28/2018  [employer] Patient Education © 2021 [employer]

## 2021-12-17 NOTE — PROGRESS NOTES
Adult Nutrition  Assessment/PES    Patient Name:  Zaina Martinez  YOB: 1965  MRN: [id number]  Admit Date:  11/29/2021    Assessment Date:  12/17/2021    Comments:  Eating well,>75% many meals; drinks the boost shakes. Will continue to monitor.     Reason for Assessment     Row Name 12/17/21 1407          Reason for Assessment    Reason For Assessment follow-up protocol                Nutrition/Diet History     Row Name 12/17/21 1408          Nutrition/Diet History    Typical Food/Fluid Intake Eating better, drinks the boost. HD yesterday. DC plan is SNF with HD. MD notes mentation improved.                  Labs/Tests/Procedures/Meds     Row Name 12/17/21 1409          Labs/Procedures/Meds    Lab Results Reviewed reviewed, pertinent     Lab Results Comments BUN 32, Cr 2.6            Diagnostic Tests/Procedures    Diagnostic Test/Procedure Reviewed reviewed, pertinent            Medications    Pertinent Medications Reviewed reviewed, pertinent                Physical Findings     Row Name 12/17/21 1410          Physical Findings    Skin surgical incision  throat puncture                  Nutrition Prescription Ordered     Row Name 12/17/21 1410          Nutrition Prescription PO    Current PO Diet Regular     Supplement Boost Glucose Control (Glucerna Shake)     Supplement Frequency 3 times a day                Evaluation of Received Nutrient/Fluid Intake     Row Name 12/17/21 1410          PO Evaluation    % PO Intake % + boosts                     Problem/Interventions:           Intervention Goal     Row Name 12/17/21 1410          Intervention Goal    General Maintain nutrition; Disease management/therapy     PO Continue positive trend; Maintain intake; PO intake (%)     PO Intake % 75 %     Weight Maintain weight                Nutrition Intervention     Row Name 12/17/21 1411          Nutrition Intervention    RD/Tech Action Follow Tx progress; Care plan reviewd; Encourage intake; Menu  provided; Interview for preference; Supplement provided                  Education/Evaluation     Row Name 12/17/21 1411          Education    Education Will Instruct as appropriate            Monitor/Evaluation    Monitor Per protocol; PO intake; Supplement intake; Pertinent labs; Weight; Skin status; Symptoms                 Electronically signed by:  Carol Jennings RD  12/17/21 14:11 EST

## 2021-12-17 NOTE — CASE MANAGEMENT/SOCIAL WORK
Continued Stay Note  Psychiatric     Patient Name: Zaina Martinez  MRN: [id number]  Today's Date: 12/17/2021    Admit Date: 11/29/2021     Discharge Plan     Row Name 12/17/21 1616       Plan    Plan Home with spouse. Has HD chair setup MWF at 1615 at [facility].  Spouse to transport Rolling walker per [facility]    Patient/Family in Agreement with Plan yes    Plan Comments Pt walked 100 ft with PT and click score 19. Pt does not qualify for SNF and will need to return home with HH. HH Referrals placed. Spoke with [facility] and pt has HD chair setup MWF at 1615. Called and spoke to Dr Powell regarding HD sat (she was on TTHS schedule here). Per Dr Powell, Ok to not get HD sat and start HD Mon at clinic and will call in HD orders to clinic for Monday. Also stated to inform pt to limit liquid intake over weekend until she is dialyzed Mon. Information placed on AVS. Notified Attending and D/C order noted.    Final Discharge Disposition Code 01 - home or self-care    Final Note Pt walked 100 ft with PT and click score 19. Pt does not qualify for SNF and will need to return home with HH. HH Referrals placed. Spoke with [facility]  [facility] and pt has HD chair setup MWF at 1615. Called and spoke to Dr Powell regarding HD sat (she was on TTHS schedule here). Per Dr Powell, Ok to not get HD sat and start HD Mon at clinic and will call in HD orders to clinic for Monday. Also stated to inform pt to limit liquid intake over weekend until she is dialyzed Mon. Information placed on AVS. Notified Attending and D/C order noted.               Discharge Codes    No documentation.               Expected Discharge Date and Time     Expected Discharge Date Expected Discharge Time    Dec 17, 2021             Francisco J Patterson RN

## 2021-12-17 NOTE — PLAN OF CARE
Problem: Adult Inpatient Plan of Care  Goal: Plan of Care Review  Outcome: Ongoing, Progressing  Flowsheets (Taken 12/17/2021 0440)  Progress: improving  Plan of Care Reviewed With: patient  Outcome Summary: VSS. On 2L of O2 throughout the night. Pt c/o back pain K pad in use. Pt c/o BLE pain PRN norco given. Pt stable and needs met at this time.

## 2021-12-17 NOTE — THERAPY TREATMENT NOTE
Patient Name: Zaina Martinez  : 1965    MRN: [id number]                              Today's Date: 2021       Admit Date: 2021    Visit Dx:     ICD-10-CM ICD-9-CM   1. Acute renal failure, unspecified acute renal failure type (HCC)  N17.9 584.9   2. Uncontrolled hypertension  I10 401.9   3. Anemia, unspecified type  D64.9 285.9   4. Elevated troponin  R77.8 790.6   5. Impaired mobility and activities of daily living  Z74.09 V49.89    Z78.9      Patient Active Problem List   Diagnosis   • MAYUR (acute kidney injury) (HCC)   • HTN (hypertension)   • Hypothyroidism   • Type 2 diabetes mellitus with hyperglycemia, with long-term current use of insulin (HCC)   • Rheumatoid arthritis (HCC)   • Angioedema   • GERD (gastroesophageal reflux disease)   • Anemia   • Medically noncompliant   • Myocardial infarction due to demand ischemia (HCC)   • Enteritis   • PRES (posterior reversible encephalopathy syndrome)   • Urine retention   • Klebsiella infection   • Superficial thrombophlebitis     Past Medical History:   Diagnosis Date   • Diabetes (HCC)    • Disease of thyroid gland    • GERD (gastroesophageal reflux disease)    • Hypertension    • Rheumatoid arthritis (HCC)      Past Surgical History:   Procedure Laterality Date   • EYE SURGERY     • HYSTERECTOMY     • INSERTION HEMODIALYSIS CATHETER N/A 2021    Procedure: HEMODIALYSIS CATHETER INSERTION;  Surgeon: Keli Salazar MD;  Location: [facility] ;  Service: Vascular;  Laterality: N/A;      General Information     Row Name 21 0937          Physical Therapy Time and Intention    Document Type therapy note (daily note)  -EM     Mode of Treatment individual therapy; physical therapy  -EM     Row Name 21 0937          General Information    Existing Precautions/Restrictions fall  -EM           User Key  (r) = Recorded By, (t) = Taken By, (c) = Cosigned By    Initials Name Provider Type    EM Lakeshia Reid PT Physical  Therapist               Mobility     Row Name 12/17/21 0937          Bed Mobility    Supine-Sit [place] (Bed Mobility) standby assist  -EM     Assistive Device (Bed Mobility) head of bed elevated  -EM     Row Name 12/17/21 0937          Sit-Stand Transfer    Sit-Stand [place] (Transfers) contact guard  -EM     Assistive Device (Sit-Stand Transfers) walker, front-wheeled  -EM     Row Name 12/17/21 0937          Gait/Stairs (Locomotion)    [place]ks Level (Gait) contact guard  -EM     Assistive Device (Gait) walker, front-wheeled  -EM     Distance in Feet (Gait) 100  -EM     Deviations/Abnormal Patterns (Gait) gait speed decreased  -EM           User Key  (r) = Recorded By, (t) = Taken By, (c) = Cosigned By    Initials Name Provider Type    Lakeshia Hamilton PT Physical Therapist               Obj/Interventions     Row Name 12/17/21 0938          Motor Skills    Therapeutic Exercise other (see comments)  AP, LAQ, marching in sitting x 15 reps  -EM           User Key  (r) = Recorded By, (t) = Taken By, (c) = Cosigned By    Initials Name Provider Type    Lakeshia Hamilton PT Physical Therapist               Goals/Plan    No documentation.                Clinical Impression     Row Name 12/17/21 0938          Pain Scale: Numbers Pre/Post-Treatment    Pre/Posttreatment Pain Comment reports some continuing knee pain in back of R knee that improves with mobility, did not rate on numeric scale  -EM     Pain Intervention(s) Repositioned  -EM     Row Name 12/17/21 0938          Plan of Care Review    Plan of Care Reviewed With patient  -EM     Progress improving  -EM     Outcome Summary Patient demonstrates improvement in stability with ambulation and with activity tolerance, able to perform bed mobility with SBA and transfers with CGA, ambulated in hallway with rwx and CGA, performed LE strengthening ex in sitting.  -EM     Row Name 12/17/21 0938          Positioning and Restraints    Pre-Treatment  Position in bed  -EM     Post Treatment Position chair  -EM     In Chair reclined; call light within reach; exit alarm on  -EM           User Key  (r) = Recorded By, (t) = Taken By, (c) = Cosigned By    Initials Name Provider Type    Lakeshia Hamilton PT Physical Therapist               Outcome Measures     Row Name 12/17/21 0942          How much help from another person do you currently need...    Turning from your back to your side while in flat bed without using bedrails? 4  -EM     Moving from lying on back to sitting on the side of a flat bed without bedrails? 3  -EM     Moving to and from a bed to a chair (including a wheelchair)? 3  -EM     Standing up from a chair using your arms (e.g., wheelchair, bedside chair)? 3  -EM     Climbing 3-5 steps with a railing? 3  -EM     To walk in hospital room? 3  -EM     AM-PAC 6 Clicks Score (PT) 19  -EM           User Key  (r) = Recorded By, (t) = Taken By, (c) = Cosigned By    Initials Name Provider Type    Lakeshia Hamilton PT Physical Therapist                             Physical Therapy Education                 Title: PT OT SLP Therapies (In Progress)     Topic: Physical Therapy (In Progress)     Point: Mobility training (In Progress)     Learning Progress Summary           Patient Acceptance, E, VU by EM at 12/17/2021 0942   Family Acceptance, E,D, NR by JERZY at 12/13/2021 1512      Show all documentation for this point (5)                 Point: Home exercise program (In Progress)     Learning Progress Summary           Patient Acceptance, E, VU by EM at 12/17/2021 0942   Family Acceptance, E,D, NR by JERZY at 12/13/2021 1512      Show all documentation for this point (3)                 Point: Body mechanics (In Progress)     Learning Progress Summary           Patient Acceptance, E, NR by AR at 12/14/2021 1016   Family Acceptance, E,D, NR by EJRZY at 12/13/2021 1512      Show all documentation for this point (2)                 Point: Precautions (In  Progress)     Learning Progress Summary           Patient Acceptance, E, NR by AR at 12/14/2021 1016   Family Acceptance, E,D, NR by JERZY at 12/13/2021 1512      Show all documentation for this point (2)                             User Key     Initials Effective Dates Name Provider Type Discipline    EM 06/16/21 -  Lakeshia Reid PT Physical Therapist PT    AR 06/16/21 -  Nell Aragon, PT Physical Therapist PT    JERZY 10/25/21 -  Osmani Aguayo, PT Student PT Student PT              PT Recommendation and Plan     Plan of Care Reviewed With: patient  Progress: improving  Outcome Summary: Patient demonstrates improvement in stability with ambulation and with activity tolerance, able to perform bed mobility with SBA and transfers with CGA, ambulated in hallway with rwx and CGA, performed LE strengthening ex in sitting.     Time Calculation:    PT Charges     Row Name 12/17/21 0943             Time Calculation    Start Time 0918  -EM      Stop Time 0934  -EM      Time Calculation (min) 16 min  -EM      PT Received On 12/17/21  -EM      PT - Next Appointment 12/18/21  -EM              Time Calculation- PT    Total Timed Code Minutes- PT 16 minute(s)  -EM              Timed Charges    66456 - PT Therapeutic Exercise Minutes 5  -EM      77135 - PT Therapeutic Activity Minutes 11  -EM              Total Minutes    Timed Charges Total Minutes 16  -EM       Total Minutes 16  -EM            User Key  (r) = Recorded By, (t) = Taken By, (c) = Cosigned By    Initials Name Provider Type    EM Lakeshia Reid PT Physical Therapist              Therapy Charges for Today     Code Description Service Date Service Provider Modifiers Qty    06788218424 HC PT THERAPEUTIC ACT EA 15 MIN 12/16/2021 Lakeshia Reid, PT GP 1    13354930834 HC PT THERAPEUTIC ACT EA 15 MIN 12/17/2021 Lakeshia Reid, PT GP 1          PT G-Codes  Outcome Measure Options: AM-PAC 6 Clicks Basic Mobility (PT)  AM-PAC 6 Clicks Score (PT):  19  AM-PAC 6 Clicks Score (OT): 18  Modified [place] Scale: 4 - Moderately severe disability.  Unable to walk without assistance, and unable to attend to own bodily needs without assistance.    Lakeshia Reid, PT  12/17/2021

## 2021-12-17 NOTE — DISCHARGE INSTR - APPOINTMENTS
[facility]  [address] [address]  Phone: [phone]    1st hemodialysis be Monday 12/20/2021 at 3:15 pm.  After 1st treatment you go Monday, Wednesday, Friday at 4:15 pm.

## 2021-12-18 ENCOUNTER — NURSE TRIAGE (OUTPATIENT)
Dept: CALL CENTER | Facility: HOSPITAL | Age: 56
End: 2021-12-18

## 2021-12-18 NOTE — OUTREACH NOTE
Prep Survey      Responses   Yarsanism facility patient discharged from? [place]   Is LACE score < 7 ? No   Emergency Room discharge w/ pulse ox? No   Eligibility [facility]   Date of Admission 11/29/21   Date of Discharge 12/17/21   Discharge Disposition [place] [facility]   Discharge diagnosis INSERTION HEMODIALYSIS CATHETER  MAYUR   for dialysis   Does the patient have one of the following disease processes/diagnoses(primary or secondary)? General Surgery   Does the patient have Home health ordered? Yes   Is there a DME ordered? Yes   What DME was ordered? Rolling walker per [facility]   Prep survey completed? Yes          Jeannette Chicas RN

## 2021-12-18 NOTE — TELEPHONE ENCOUNTER
"Advised trying to call PCP via answering service.      Reason for Disposition  • [1] Caller requesting NON-URGENT health information AND [2] PCP's office is the best resource    Additional Information  • Negative: [1] Caller is not with the adult (patient) AND [2] reporting urgent symptoms  • Negative: Lab result questions  • Negative: Medication questions  • Negative: Caller can't be reached by phone  • Negative: Caller has already spoken to PCP or another triager  • Negative: RN needs further essential information from caller in order to complete triage  • Negative: Requesting regular office appointment    Answer Assessment - Initial Assessment Questions  1. REASON FOR CALL or QUESTION: \"What is your reason for calling today?\" or \"How can I best help you?\" or \"What question do you have that I can help answer?\"       is he caller.  Ms. Martinez was discharged from hospital yesterday after an 18 day stay.  He states they wanted to discharge her to a rehab facility but could not find a bed.  She has fallen today.  He states he cannot take care of her at home.  Advised callling her PCP, or going back to the ED.    Protocols used: INFORMATION ONLY CALL - NO TRIAGE-ADULT-      "

## 2021-12-19 ENCOUNTER — HOSPITAL ENCOUNTER (INPATIENT)
Facility: HOSPITAL | Age: 56
LOS: 10 days | Discharge: NURSING, REHAB OR LONG-TERM CARE | End: 2021-12-29
Attending: EMERGENCY MEDICINE | Admitting: INTERNAL MEDICINE

## 2021-12-19 ENCOUNTER — APPOINTMENT (OUTPATIENT)
Dept: GENERAL RADIOLOGY | Facility: HOSPITAL | Age: 56
End: 2021-12-19

## 2021-12-19 DIAGNOSIS — E11.65 HYPERGLYCEMIA DUE TO DIABETES MELLITUS: ICD-10-CM

## 2021-12-19 DIAGNOSIS — E87.70 EDEMA DUE TO HYPERVOLEMIA: ICD-10-CM

## 2021-12-19 DIAGNOSIS — E87.5 HYPERKALEMIA: ICD-10-CM

## 2021-12-19 DIAGNOSIS — N18.6 ESRD (END STAGE RENAL DISEASE) ON DIALYSIS: ICD-10-CM

## 2021-12-19 DIAGNOSIS — N39.0 ACUTE UTI: ICD-10-CM

## 2021-12-19 DIAGNOSIS — R53.1 GENERALIZED WEAKNESS: Primary | ICD-10-CM

## 2021-12-19 DIAGNOSIS — Z99.2 ESRD (END STAGE RENAL DISEASE) ON DIALYSIS: ICD-10-CM

## 2021-12-19 LAB
ALBUMIN SERPL-MCNC: 2.6 G/DL (ref 3.5–5.2)
ALBUMIN/GLOB SERPL: 0.7 G/DL
ALP SERPL-CCNC: 317 U/L (ref 39–117)
ALT SERPL W P-5'-P-CCNC: 52 U/L (ref 1–33)
AMORPH URATE CRY URNS QL MICRO: ABNORMAL /HPF
ANION GAP SERPL CALCULATED.3IONS-SCNC: 9.6 MMOL/L (ref 5–15)
ANISOCYTOSIS BLD QL: ABNORMAL
AST SERPL-CCNC: 81 U/L (ref 1–32)
BACTERIA UR QL AUTO: ABNORMAL /HPF
BILIRUB SERPL-MCNC: <0.2 MG/DL (ref 0–1.2)
BILIRUB UR QL STRIP: NEGATIVE
BUN SERPL-MCNC: 57 MG/DL (ref 6–20)
BUN/CREAT SERPL: 14.5 (ref 7–25)
CALCIUM SPEC-SCNC: 8 MG/DL (ref 8.6–10.5)
CHLORIDE SERPL-SCNC: 95 MMOL/L (ref 98–107)
CLARITY UR: ABNORMAL
CO2 SERPL-SCNC: 23.4 MMOL/L (ref 22–29)
COLOR UR: YELLOW
CREAT SERPL-MCNC: 3.92 MG/DL (ref 0.57–1)
DEPRECATED RDW RBC AUTO: 50.1 FL (ref 37–54)
ERYTHROCYTE [DISTWIDTH] IN BLOOD BY AUTOMATED COUNT: 15.2 % (ref 12.3–15.4)
GFR SERPL CREATININE-BSD FRML MDRD: 12 ML/MIN/1.73
GFR SERPL CREATININE-BSD FRML MDRD: ABNORMAL ML/MIN/{1.73_M2}
GLOBULIN UR ELPH-MCNC: 3.7 GM/DL
GLUCOSE BLDC GLUCOMTR-MCNC: 202 MG/DL (ref 70–130)
GLUCOSE BLDC GLUCOMTR-MCNC: 504 MG/DL (ref 70–130)
GLUCOSE SERPL-MCNC: 556 MG/DL (ref 65–99)
GLUCOSE UR STRIP-MCNC: ABNORMAL MG/DL
HCT VFR BLD AUTO: 29.5 % (ref 34–46.6)
HGB BLD-MCNC: 9 G/DL (ref 12–15.9)
HGB UR QL STRIP.AUTO: ABNORMAL
HOLD SPECIMEN: NORMAL
HOLD SPECIMEN: NORMAL
HYALINE CASTS UR QL AUTO: ABNORMAL /LPF
KETONES UR QL STRIP: NEGATIVE
LEUKOCYTE ESTERASE UR QL STRIP.AUTO: ABNORMAL
LIPASE SERPL-CCNC: 21 U/L (ref 13–60)
LYMPHOCYTES # BLD MANUAL: 0.46 10*3/MM3 (ref 0.7–3.1)
LYMPHOCYTES NFR BLD MANUAL: 6.1 % (ref 5–12)
MAGNESIUM SERPL-MCNC: 1.6 MG/DL (ref 1.6–2.6)
MCH RBC QN AUTO: 27.7 PG (ref 26.6–33)
MCHC RBC AUTO-ENTMCNC: 30.5 G/DL (ref 31.5–35.7)
MCV RBC AUTO: 90.8 FL (ref 79–97)
METAMYELOCYTES NFR BLD MANUAL: 4.1 % (ref 0–0)
MONOCYTES # BLD: 0.9 10*3/MM3 (ref 0.1–0.9)
MYELOCYTES NFR BLD MANUAL: 5.1 % (ref 0–0)
NEUTROPHILS # BLD AUTO: 12 10*3/MM3 (ref 1.7–7)
NEUTROPHILS NFR BLD MANUAL: 81.6 % (ref 42.7–76)
NITRITE UR QL STRIP: NEGATIVE
NRBC BLD AUTO-RTO: 0.1 /100 WBC (ref 0–0.2)
NT-PROBNP SERPL-MCNC: ABNORMAL PG/ML (ref 0–900)
PH UR STRIP.AUTO: 5.5 [PH] (ref 5–8)
PLAT MORPH BLD: NORMAL
PLATELET # BLD AUTO: 217 10*3/MM3 (ref 140–450)
PMV BLD AUTO: 11.7 FL (ref 6–12)
POTASSIUM SERPL-SCNC: 6.3 MMOL/L (ref 3.5–5.2)
PROT SERPL-MCNC: 6.3 G/DL (ref 6–8.5)
PROT UR QL STRIP: ABNORMAL
QT INTERVAL: 397 MS
RBC # BLD AUTO: 3.25 10*6/MM3 (ref 3.77–5.28)
RBC # UR STRIP: ABNORMAL /HPF
REF LAB TEST METHOD: ABNORMAL
SARS-COV-2 RNA RESP QL NAA+PROBE: NOT DETECTED
SODIUM SERPL-SCNC: 128 MMOL/L (ref 136–145)
SP GR UR STRIP: 1.02 (ref 1–1.03)
SQUAMOUS #/AREA URNS HPF: ABNORMAL /HPF
TROPONIN T SERPL-MCNC: 0.09 NG/ML (ref 0–0.03)
UROBILINOGEN UR QL STRIP: ABNORMAL
VARIANT LYMPHS NFR BLD MANUAL: 3.1 % (ref 19.6–45.3)
WBC # UR STRIP: ABNORMAL /HPF
WBC MORPH BLD: NORMAL
WBC NRBC COR # BLD: 14.7 10*3/MM3 (ref 3.4–10.8)
WHOLE BLOOD HOLD SPECIMEN: NORMAL
WHOLE BLOOD HOLD SPECIMEN: NORMAL
YEAST URNS QL MICRO: ABNORMAL /HPF

## 2021-12-19 PROCEDURE — 71045 X-RAY EXAM CHEST 1 VIEW: CPT

## 2021-12-19 PROCEDURE — 63710000001 INSULIN REGULAR HUMAN PER 5 UNITS: Performed by: EMERGENCY MEDICINE

## 2021-12-19 PROCEDURE — 82962 GLUCOSE BLOOD TEST: CPT

## 2021-12-19 PROCEDURE — 93005 ELECTROCARDIOGRAM TRACING: CPT | Performed by: EMERGENCY MEDICINE

## 2021-12-19 PROCEDURE — 83690 ASSAY OF LIPASE: CPT | Performed by: EMERGENCY MEDICINE

## 2021-12-19 PROCEDURE — 80053 COMPREHEN METABOLIC PANEL: CPT | Performed by: EMERGENCY MEDICINE

## 2021-12-19 PROCEDURE — 85025 COMPLETE CBC W/AUTO DIFF WBC: CPT | Performed by: EMERGENCY MEDICINE

## 2021-12-19 PROCEDURE — 25010000002 CEFTRIAXONE PER 250 MG: Performed by: EMERGENCY MEDICINE

## 2021-12-19 PROCEDURE — 36415 COLL VENOUS BLD VENIPUNCTURE: CPT | Performed by: INTERNAL MEDICINE

## 2021-12-19 PROCEDURE — 84484 ASSAY OF TROPONIN QUANT: CPT | Performed by: EMERGENCY MEDICINE

## 2021-12-19 PROCEDURE — 25010000002 HYDROMORPHONE PER 4 MG: Performed by: EMERGENCY MEDICINE

## 2021-12-19 PROCEDURE — 87086 URINE CULTURE/COLONY COUNT: CPT | Performed by: EMERGENCY MEDICINE

## 2021-12-19 PROCEDURE — 99285 EMERGENCY DEPT VISIT HI MDM: CPT

## 2021-12-19 PROCEDURE — 84132 ASSAY OF SERUM POTASSIUM: CPT | Performed by: INTERNAL MEDICINE

## 2021-12-19 PROCEDURE — 93010 ELECTROCARDIOGRAM REPORT: CPT | Performed by: INTERNAL MEDICINE

## 2021-12-19 PROCEDURE — P9612 CATHETERIZE FOR URINE SPEC: HCPCS

## 2021-12-19 PROCEDURE — 85007 BL SMEAR W/DIFF WBC COUNT: CPT | Performed by: EMERGENCY MEDICINE

## 2021-12-19 PROCEDURE — 83735 ASSAY OF MAGNESIUM: CPT | Performed by: EMERGENCY MEDICINE

## 2021-12-19 PROCEDURE — 81001 URINALYSIS AUTO W/SCOPE: CPT | Performed by: EMERGENCY MEDICINE

## 2021-12-19 PROCEDURE — U0003 INFECTIOUS AGENT DETECTION BY NUCLEIC ACID (DNA OR RNA); SEVERE ACUTE RESPIRATORY SYNDROME CORONAVIRUS 2 (SARS-COV-2) (CORONAVIRUS DISEASE [COVID-19]), AMPLIFIED PROBE TECHNIQUE, MAKING USE OF HIGH THROUGHPUT TECHNOLOGIES AS DESCRIBED BY CMS-2020-01-R: HCPCS | Performed by: EMERGENCY MEDICINE

## 2021-12-19 PROCEDURE — 83880 ASSAY OF NATRIURETIC PEPTIDE: CPT | Performed by: EMERGENCY MEDICINE

## 2021-12-19 RX ORDER — LEVOTHYROXINE SODIUM 0.12 MG/1
125 TABLET ORAL DAILY
Status: DISCONTINUED | OUTPATIENT
Start: 2021-12-20 | End: 2021-12-29 | Stop reason: HOSPADM

## 2021-12-19 RX ORDER — DEXTROSE MONOHYDRATE 25 G/50ML
25 INJECTION, SOLUTION INTRAVENOUS
Status: DISCONTINUED | OUTPATIENT
Start: 2021-12-19 | End: 2021-12-29 | Stop reason: HOSPADM

## 2021-12-19 RX ORDER — OLANZAPINE 5 MG/1
5 TABLET ORAL 2 TIMES DAILY
Status: DISCONTINUED | OUTPATIENT
Start: 2021-12-19 | End: 2021-12-29 | Stop reason: HOSPADM

## 2021-12-19 RX ORDER — UREA 10 %
3 LOTION (ML) TOPICAL NIGHTLY PRN
Status: DISCONTINUED | OUTPATIENT
Start: 2021-12-19 | End: 2021-12-29 | Stop reason: HOSPADM

## 2021-12-19 RX ORDER — AMLODIPINE BESYLATE 10 MG/1
10 TABLET ORAL
Status: DISCONTINUED | OUTPATIENT
Start: 2021-12-20 | End: 2021-12-24

## 2021-12-19 RX ORDER — ACETAMINOPHEN 325 MG/1
650 TABLET ORAL EVERY 4 HOURS PRN
Status: DISCONTINUED | OUTPATIENT
Start: 2021-12-19 | End: 2021-12-29 | Stop reason: HOSPADM

## 2021-12-19 RX ORDER — TORSEMIDE 100 MG/1
100 TABLET ORAL DAILY
Status: DISCONTINUED | OUTPATIENT
Start: 2021-12-20 | End: 2021-12-20

## 2021-12-19 RX ORDER — ASPIRIN 81 MG/1
81 TABLET, CHEWABLE ORAL DAILY
Status: DISCONTINUED | OUTPATIENT
Start: 2021-12-20 | End: 2021-12-29 | Stop reason: HOSPADM

## 2021-12-19 RX ORDER — NICOTINE POLACRILEX 4 MG
15 LOZENGE BUCCAL
Status: DISCONTINUED | OUTPATIENT
Start: 2021-12-19 | End: 2021-12-29 | Stop reason: HOSPADM

## 2021-12-19 RX ORDER — INSULIN LISPRO 100 [IU]/ML
3 INJECTION, SOLUTION INTRAVENOUS; SUBCUTANEOUS
Status: DISCONTINUED | OUTPATIENT
Start: 2021-12-20 | End: 2021-12-29 | Stop reason: HOSPADM

## 2021-12-19 RX ORDER — ROPINIROLE 0.5 MG/1
0.75 TABLET, FILM COATED ORAL NIGHTLY
Status: DISCONTINUED | OUTPATIENT
Start: 2021-12-19 | End: 2021-12-29 | Stop reason: HOSPADM

## 2021-12-19 RX ORDER — NITROGLYCERIN 0.4 MG/1
0.4 TABLET SUBLINGUAL
Status: DISCONTINUED | OUTPATIENT
Start: 2021-12-19 | End: 2021-12-29 | Stop reason: HOSPADM

## 2021-12-19 RX ORDER — MULTIPLE VITAMINS W/ MINERALS TAB 9MG-400MCG
1 TAB ORAL DAILY
Status: DISCONTINUED | OUTPATIENT
Start: 2021-12-20 | End: 2021-12-29 | Stop reason: HOSPADM

## 2021-12-19 RX ORDER — ONDANSETRON 4 MG/1
4 TABLET, FILM COATED ORAL EVERY 6 HOURS PRN
Status: DISCONTINUED | OUTPATIENT
Start: 2021-12-19 | End: 2021-12-29 | Stop reason: HOSPADM

## 2021-12-19 RX ORDER — TAMSULOSIN HYDROCHLORIDE 0.4 MG/1
0.4 CAPSULE ORAL DAILY
Status: DISCONTINUED | OUTPATIENT
Start: 2021-12-20 | End: 2021-12-29 | Stop reason: HOSPADM

## 2021-12-19 RX ORDER — FOLIC ACID 1 MG/1
1 TABLET ORAL DAILY
Status: DISCONTINUED | OUTPATIENT
Start: 2021-12-20 | End: 2021-12-29 | Stop reason: HOSPADM

## 2021-12-19 RX ORDER — INSULIN LISPRO 100 [IU]/ML
0-9 INJECTION, SOLUTION INTRAVENOUS; SUBCUTANEOUS
Status: DISCONTINUED | OUTPATIENT
Start: 2021-12-19 | End: 2021-12-29 | Stop reason: HOSPADM

## 2021-12-19 RX ORDER — PANTOPRAZOLE SODIUM 40 MG/1
40 TABLET, DELAYED RELEASE ORAL DAILY
Status: DISCONTINUED | OUTPATIENT
Start: 2021-12-20 | End: 2021-12-29 | Stop reason: HOSPADM

## 2021-12-19 RX ORDER — MAGNESIUM SULFATE HEPTAHYDRATE 40 MG/ML
2 INJECTION, SOLUTION INTRAVENOUS ONCE
Status: DISCONTINUED | OUTPATIENT
Start: 2021-12-19 | End: 2021-12-29 | Stop reason: HOSPADM

## 2021-12-19 RX ORDER — HYDROCODONE BITARTRATE AND ACETAMINOPHEN 5; 325 MG/1; MG/1
1 TABLET ORAL ONCE AS NEEDED
Status: COMPLETED | OUTPATIENT
Start: 2021-12-19 | End: 2021-12-19

## 2021-12-19 RX ORDER — ONDANSETRON 2 MG/ML
4 INJECTION INTRAMUSCULAR; INTRAVENOUS EVERY 6 HOURS PRN
Status: DISCONTINUED | OUTPATIENT
Start: 2021-12-19 | End: 2021-12-29 | Stop reason: HOSPADM

## 2021-12-19 RX ORDER — HYDROMORPHONE HYDROCHLORIDE 1 MG/ML
0.5 INJECTION, SOLUTION INTRAMUSCULAR; INTRAVENOUS; SUBCUTANEOUS ONCE
Status: COMPLETED | OUTPATIENT
Start: 2021-12-19 | End: 2021-12-19

## 2021-12-19 RX ORDER — SODIUM CHLORIDE 0.9 % (FLUSH) 0.9 %
10 SYRINGE (ML) INJECTION AS NEEDED
Status: DISCONTINUED | OUTPATIENT
Start: 2021-12-19 | End: 2021-12-29 | Stop reason: HOSPADM

## 2021-12-19 RX ADMIN — HYDROCODONE BITARTRATE AND ACETAMINOPHEN 1 TABLET: 5; 325 TABLET ORAL at 22:57

## 2021-12-19 RX ADMIN — Medication 3 MG: at 22:57

## 2021-12-19 RX ADMIN — INSULIN HUMAN 10 UNITS: 100 INJECTION, SOLUTION PARENTERAL at 15:29

## 2021-12-19 RX ADMIN — SODIUM CHLORIDE 500 ML: 9 INJECTION, SOLUTION INTRAVENOUS at 13:15

## 2021-12-19 RX ADMIN — ROPINIROLE HYDROCHLORIDE 0.75 MG: 0.5 TABLET, FILM COATED ORAL at 22:56

## 2021-12-19 RX ADMIN — CEFTRIAXONE 1 G: 1 INJECTION, POWDER, FOR SOLUTION INTRAMUSCULAR; INTRAVENOUS at 15:31

## 2021-12-19 RX ADMIN — METOPROLOL TARTRATE 25 MG: 25 TABLET, FILM COATED ORAL at 22:57

## 2021-12-19 RX ADMIN — OLANZAPINE 5 MG: 5 TABLET ORAL at 22:57

## 2021-12-19 RX ADMIN — SODIUM CHLORIDE, PRESERVATIVE FREE 10 ML: 5 INJECTION INTRAVENOUS at 13:15

## 2021-12-19 RX ADMIN — HYDROMORPHONE HYDROCHLORIDE 0.5 MG: 1 INJECTION, SOLUTION INTRAMUSCULAR; INTRAVENOUS; SUBCUTANEOUS at 15:48

## 2021-12-20 ENCOUNTER — APPOINTMENT (OUTPATIENT)
Dept: CT IMAGING | Facility: HOSPITAL | Age: 56
End: 2021-12-20

## 2021-12-20 ENCOUNTER — READMISSION MANAGEMENT (OUTPATIENT)
Dept: CALL CENTER | Facility: HOSPITAL | Age: 56
End: 2021-12-20

## 2021-12-20 PROBLEM — N39.0 UTI (URINARY TRACT INFECTION), BACTERIAL: Status: ACTIVE | Noted: 2021-12-20

## 2021-12-20 PROBLEM — I25.10 CAD (CORONARY ARTERY DISEASE): Status: ACTIVE | Noted: 2021-12-20

## 2021-12-20 PROBLEM — A49.9 UTI (URINARY TRACT INFECTION), BACTERIAL: Status: ACTIVE | Noted: 2021-12-20

## 2021-12-20 PROBLEM — N18.6 ESRD (END STAGE RENAL DISEASE): Status: ACTIVE | Noted: 2021-12-20

## 2021-12-20 LAB
ALBUMIN SERPL-MCNC: 2.1 G/DL (ref 3.5–5.2)
ANION GAP SERPL CALCULATED.3IONS-SCNC: 9.9 MMOL/L (ref 5–15)
BACTERIA SPEC AEROBE CULT: NO GROWTH
BUN SERPL-MCNC: 32 MG/DL (ref 6–20)
BUN/CREAT SERPL: 12.4 (ref 7–25)
CALCIUM SPEC-SCNC: 8.1 MG/DL (ref 8.6–10.5)
CHLORIDE SERPL-SCNC: 99 MMOL/L (ref 98–107)
CO2 SERPL-SCNC: 24.1 MMOL/L (ref 22–29)
CREAT SERPL-MCNC: 2.58 MG/DL (ref 0.57–1)
DEPRECATED RDW RBC AUTO: 49.5 FL (ref 37–54)
ERYTHROCYTE [DISTWIDTH] IN BLOOD BY AUTOMATED COUNT: 15.3 % (ref 12.3–15.4)
GFR SERPL CREATININE-BSD FRML MDRD: 19 ML/MIN/1.73
GLUCOSE BLDC GLUCOMTR-MCNC: 147 MG/DL (ref 70–130)
GLUCOSE BLDC GLUCOMTR-MCNC: 213 MG/DL (ref 70–130)
GLUCOSE BLDC GLUCOMTR-MCNC: 284 MG/DL (ref 70–130)
GLUCOSE BLDC GLUCOMTR-MCNC: 85 MG/DL (ref 70–130)
GLUCOSE SERPL-MCNC: 294 MG/DL (ref 65–99)
HBA1C MFR BLD: 7.6 % (ref 4.8–5.6)
HCT VFR BLD AUTO: 26 % (ref 34–46.6)
HGB BLD-MCNC: 8.2 G/DL (ref 12–15.9)
MCH RBC QN AUTO: 27.9 PG (ref 26.6–33)
MCHC RBC AUTO-ENTMCNC: 31.5 G/DL (ref 31.5–35.7)
MCV RBC AUTO: 88.4 FL (ref 79–97)
PHOSPHATE SERPL-MCNC: 3.8 MG/DL (ref 2.5–4.5)
PLATELET # BLD AUTO: 200 10*3/MM3 (ref 140–450)
PMV BLD AUTO: 11.5 FL (ref 6–12)
POTASSIUM SERPL-SCNC: 4.9 MMOL/L (ref 3.5–5.2)
POTASSIUM SERPL-SCNC: 5.2 MMOL/L (ref 3.5–5.2)
RBC # BLD AUTO: 2.94 10*6/MM3 (ref 3.77–5.28)
SODIUM SERPL-SCNC: 133 MMOL/L (ref 136–145)
WBC NRBC COR # BLD: 13.92 10*3/MM3 (ref 3.4–10.8)

## 2021-12-20 PROCEDURE — 97165 OT EVAL LOW COMPLEX 30 MIN: CPT

## 2021-12-20 PROCEDURE — 25010000002 CEFTRIAXONE PER 250 MG: Performed by: INTERNAL MEDICINE

## 2021-12-20 PROCEDURE — 72192 CT PELVIS W/O DYE: CPT

## 2021-12-20 PROCEDURE — 73700 CT LOWER EXTREMITY W/O DYE: CPT

## 2021-12-20 PROCEDURE — 80069 RENAL FUNCTION PANEL: CPT | Performed by: INTERNAL MEDICINE

## 2021-12-20 PROCEDURE — 63710000001 INSULIN LISPRO (HUMAN) PER 5 UNITS: Performed by: INTERNAL MEDICINE

## 2021-12-20 PROCEDURE — 25010000002 MORPHINE PER 10 MG: Performed by: NURSE PRACTITIONER

## 2021-12-20 PROCEDURE — 97535 SELF CARE MNGMENT TRAINING: CPT

## 2021-12-20 PROCEDURE — 63710000001 INSULIN GLARGINE PER 5 UNITS: Performed by: INTERNAL MEDICINE

## 2021-12-20 PROCEDURE — 85027 COMPLETE CBC AUTOMATED: CPT | Performed by: INTERNAL MEDICINE

## 2021-12-20 PROCEDURE — 83036 HEMOGLOBIN GLYCOSYLATED A1C: CPT | Performed by: INTERNAL MEDICINE

## 2021-12-20 PROCEDURE — 82962 GLUCOSE BLOOD TEST: CPT

## 2021-12-20 RX ORDER — MORPHINE SULFATE 2 MG/ML
2 INJECTION, SOLUTION INTRAMUSCULAR; INTRAVENOUS ONCE
Status: COMPLETED | OUTPATIENT
Start: 2021-12-20 | End: 2021-12-20

## 2021-12-20 RX ORDER — OXYCODONE HYDROCHLORIDE AND ACETAMINOPHEN 5; 325 MG/1; MG/1
1 TABLET ORAL EVERY 6 HOURS PRN
Status: DISPENSED | OUTPATIENT
Start: 2021-12-20 | End: 2021-12-27

## 2021-12-20 RX ADMIN — MULTIPLE VITAMINS W/ MINERALS TAB 1 TABLET: TAB at 08:49

## 2021-12-20 RX ADMIN — Medication 3 MG: at 21:18

## 2021-12-20 RX ADMIN — ROPINIROLE HYDROCHLORIDE 0.75 MG: 0.5 TABLET, FILM COATED ORAL at 20:23

## 2021-12-20 RX ADMIN — INSULIN LISPRO 3 UNITS: 100 INJECTION, SOLUTION INTRAVENOUS; SUBCUTANEOUS at 12:39

## 2021-12-20 RX ADMIN — AMLODIPINE BESYLATE 10 MG: 10 TABLET ORAL at 08:49

## 2021-12-20 RX ADMIN — ACETAMINOPHEN 650 MG: 325 TABLET, FILM COATED ORAL at 09:03

## 2021-12-20 RX ADMIN — LEVOTHYROXINE SODIUM 125 MCG: 0.12 TABLET ORAL at 08:49

## 2021-12-20 RX ADMIN — ASPIRIN 81 MG: 81 TABLET, CHEWABLE ORAL at 08:49

## 2021-12-20 RX ADMIN — INSULIN GLARGINE-YFGN 10 UNITS: 100 INJECTION, SOLUTION SUBCUTANEOUS at 08:51

## 2021-12-20 RX ADMIN — INSULIN LISPRO 6 UNITS: 100 INJECTION, SOLUTION INTRAVENOUS; SUBCUTANEOUS at 08:50

## 2021-12-20 RX ADMIN — PANTOPRAZOLE SODIUM 40 MG: 40 TABLET, DELAYED RELEASE ORAL at 08:49

## 2021-12-20 RX ADMIN — MORPHINE SULFATE 2 MG: 2 INJECTION, SOLUTION INTRAMUSCULAR; INTRAVENOUS at 05:43

## 2021-12-20 RX ADMIN — OLANZAPINE 5 MG: 5 TABLET ORAL at 08:49

## 2021-12-20 RX ADMIN — OXYCODONE AND ACETAMINOPHEN 1 TABLET: 5; 325 TABLET ORAL at 12:41

## 2021-12-20 RX ADMIN — TAMSULOSIN HYDROCHLORIDE 0.4 MG: 0.4 CAPSULE ORAL at 08:49

## 2021-12-20 RX ADMIN — OLANZAPINE 5 MG: 5 TABLET ORAL at 20:23

## 2021-12-20 RX ADMIN — INSULIN LISPRO 3 UNITS: 100 INJECTION, SOLUTION INTRAVENOUS; SUBCUTANEOUS at 08:50

## 2021-12-20 RX ADMIN — INSULIN LISPRO 4 UNITS: 100 INJECTION, SOLUTION INTRAVENOUS; SUBCUTANEOUS at 12:39

## 2021-12-20 RX ADMIN — FOLIC ACID 1 MG: 1 TABLET ORAL at 08:49

## 2021-12-20 RX ADMIN — SERTRALINE HYDROCHLORIDE 50 MG: 50 TABLET, FILM COATED ORAL at 08:49

## 2021-12-20 RX ADMIN — CEFTRIAXONE 1 G: 1 INJECTION, POWDER, FOR SOLUTION INTRAMUSCULAR; INTRAVENOUS at 16:22

## 2021-12-20 RX ADMIN — TORSEMIDE 100 MG: 100 TABLET ORAL at 08:49

## 2021-12-20 RX ADMIN — OXYCODONE AND ACETAMINOPHEN 1 TABLET: 5; 325 TABLET ORAL at 21:19

## 2021-12-20 RX ADMIN — METOPROLOL TARTRATE 25 MG: 25 TABLET, FILM COATED ORAL at 20:23

## 2021-12-20 RX ADMIN — METOPROLOL TARTRATE 25 MG: 25 TABLET, FILM COATED ORAL at 08:49

## 2021-12-20 NOTE — OUTREACH NOTE
General Surgery Week 1 Survey      Responses   [facility] patient discharged from? [place]   Does the patient have one of the following disease processes/diagnoses(primary or secondary)? General Surgery   Week 1 attempt successful? No   Revoke Readmitted          Amanda Serrano RN

## 2021-12-21 ENCOUNTER — APPOINTMENT (OUTPATIENT)
Dept: GENERAL RADIOLOGY | Facility: HOSPITAL | Age: 56
End: 2021-12-21

## 2021-12-21 LAB
ALBUMIN SERPL-MCNC: 1.9 G/DL (ref 3.5–5.2)
ANION GAP SERPL CALCULATED.3IONS-SCNC: 8.7 MMOL/L (ref 5–15)
BACTERIA SPEC AEROBE CULT: ABNORMAL
BASOPHILS # BLD AUTO: 0.06 10*3/MM3 (ref 0–0.2)
BASOPHILS NFR BLD AUTO: 0.4 % (ref 0–1.5)
BUN SERPL-MCNC: 40 MG/DL (ref 6–20)
BUN/CREAT SERPL: 11.1 (ref 7–25)
CALCIUM SPEC-SCNC: 8.1 MG/DL (ref 8.6–10.5)
CHLORIDE SERPL-SCNC: 101 MMOL/L (ref 98–107)
CO2 SERPL-SCNC: 24.3 MMOL/L (ref 22–29)
CREAT SERPL-MCNC: 3.6 MG/DL (ref 0.57–1)
DEPRECATED RDW RBC AUTO: 47.2 FL (ref 37–54)
EOSINOPHIL # BLD AUTO: 0.13 10*3/MM3 (ref 0–0.4)
EOSINOPHIL NFR BLD AUTO: 0.9 % (ref 0.3–6.2)
ERYTHROCYTE [DISTWIDTH] IN BLOOD BY AUTOMATED COUNT: 15.4 % (ref 12.3–15.4)
GFR SERPL CREATININE-BSD FRML MDRD: 13 ML/MIN/1.73
GFR SERPL CREATININE-BSD FRML MDRD: ABNORMAL ML/MIN/{1.73_M2}
GLUCOSE BLDC GLUCOMTR-MCNC: 115 MG/DL (ref 70–130)
GLUCOSE BLDC GLUCOMTR-MCNC: 148 MG/DL (ref 70–130)
GLUCOSE BLDC GLUCOMTR-MCNC: 188 MG/DL (ref 70–130)
GLUCOSE BLDC GLUCOMTR-MCNC: 372 MG/DL (ref 70–130)
GLUCOSE SERPL-MCNC: 154 MG/DL (ref 65–99)
GRAM STN SPEC: ABNORMAL
HCT VFR BLD AUTO: 24.3 % (ref 34–46.6)
HGB BLD-MCNC: 7.8 G/DL (ref 12–15.9)
IMM GRANULOCYTES # BLD AUTO: 0.39 10*3/MM3 (ref 0–0.05)
IMM GRANULOCYTES NFR BLD AUTO: 2.7 % (ref 0–0.5)
ISOLATED FROM: ABNORMAL
LYMPHOCYTES # BLD AUTO: 1.54 10*3/MM3 (ref 0.7–3.1)
LYMPHOCYTES NFR BLD AUTO: 10.5 % (ref 19.6–45.3)
MAGNESIUM SERPL-MCNC: 1.7 MG/DL (ref 1.6–2.6)
MCH RBC QN AUTO: 27.5 PG (ref 26.6–33)
MCHC RBC AUTO-ENTMCNC: 32.1 G/DL (ref 31.5–35.7)
MCV RBC AUTO: 85.6 FL (ref 79–97)
MONOCYTES # BLD AUTO: 1.63 10*3/MM3 (ref 0.1–0.9)
MONOCYTES NFR BLD AUTO: 11.1 % (ref 5–12)
NEUTROPHILS NFR BLD AUTO: 10.92 10*3/MM3 (ref 1.7–7)
NEUTROPHILS NFR BLD AUTO: 74.4 % (ref 42.7–76)
NRBC BLD AUTO-RTO: 0.1 /100 WBC (ref 0–0.2)
PHOSPHATE SERPL-MCNC: 5.1 MG/DL (ref 2.5–4.5)
PLATELET # BLD AUTO: 220 10*3/MM3 (ref 140–450)
PMV BLD AUTO: 12 FL (ref 6–12)
POTASSIUM SERPL-SCNC: 5.2 MMOL/L (ref 3.5–5.2)
RBC # BLD AUTO: 2.84 10*6/MM3 (ref 3.77–5.28)
SODIUM SERPL-SCNC: 134 MMOL/L (ref 136–145)
WBC NRBC COR # BLD: 14.67 10*3/MM3 (ref 3.4–10.8)

## 2021-12-21 PROCEDURE — 97110 THERAPEUTIC EXERCISES: CPT

## 2021-12-21 PROCEDURE — 5A1D70Z PERFORMANCE OF URINARY FILTRATION, INTERMITTENT, LESS THAN 6 HOURS PER DAY: ICD-10-PCS | Performed by: INTERNAL MEDICINE

## 2021-12-21 PROCEDURE — 82962 GLUCOSE BLOOD TEST: CPT

## 2021-12-21 PROCEDURE — 83735 ASSAY OF MAGNESIUM: CPT | Performed by: INTERNAL MEDICINE

## 2021-12-21 PROCEDURE — 63710000001 INSULIN GLARGINE PER 5 UNITS: Performed by: INTERNAL MEDICINE

## 2021-12-21 PROCEDURE — 25010000002 HEPARIN (PORCINE) PER 1000 UNITS: Performed by: INTERNAL MEDICINE

## 2021-12-21 PROCEDURE — 97162 PT EVAL MOD COMPLEX 30 MIN: CPT

## 2021-12-21 PROCEDURE — 72170 X-RAY EXAM OF PELVIS: CPT

## 2021-12-21 PROCEDURE — 73552 X-RAY EXAM OF FEMUR 2/>: CPT

## 2021-12-21 PROCEDURE — 97535 SELF CARE MNGMENT TRAINING: CPT

## 2021-12-21 PROCEDURE — 25010000002 CEFTRIAXONE PER 250 MG: Performed by: INTERNAL MEDICINE

## 2021-12-21 PROCEDURE — 85025 COMPLETE CBC W/AUTO DIFF WBC: CPT | Performed by: INTERNAL MEDICINE

## 2021-12-21 PROCEDURE — 97530 THERAPEUTIC ACTIVITIES: CPT

## 2021-12-21 PROCEDURE — 63710000001 INSULIN LISPRO (HUMAN) PER 5 UNITS: Performed by: INTERNAL MEDICINE

## 2021-12-21 PROCEDURE — 80069 RENAL FUNCTION PANEL: CPT | Performed by: INTERNAL MEDICINE

## 2021-12-21 RX ORDER — HEPARIN SODIUM 1000 [USP'U]/ML
4000 INJECTION, SOLUTION INTRAVENOUS; SUBCUTANEOUS ONCE
Status: COMPLETED | OUTPATIENT
Start: 2021-12-21 | End: 2021-12-21

## 2021-12-21 RX ADMIN — ROPINIROLE HYDROCHLORIDE 0.75 MG: 0.5 TABLET, FILM COATED ORAL at 21:00

## 2021-12-21 RX ADMIN — AMLODIPINE BESYLATE 10 MG: 10 TABLET ORAL at 12:50

## 2021-12-21 RX ADMIN — OXYCODONE AND ACETAMINOPHEN 1 TABLET: 5; 325 TABLET ORAL at 21:24

## 2021-12-21 RX ADMIN — METOPROLOL TARTRATE 25 MG: 25 TABLET, FILM COATED ORAL at 20:59

## 2021-12-21 RX ADMIN — MULTIPLE VITAMINS W/ MINERALS TAB 1 TABLET: TAB at 12:50

## 2021-12-21 RX ADMIN — ACETAMINOPHEN 650 MG: 325 TABLET, FILM COATED ORAL at 09:00

## 2021-12-21 RX ADMIN — INSULIN GLARGINE-YFGN 10 UNITS: 100 INJECTION, SOLUTION SUBCUTANEOUS at 12:50

## 2021-12-21 RX ADMIN — OLANZAPINE 5 MG: 5 TABLET ORAL at 12:50

## 2021-12-21 RX ADMIN — METOPROLOL TARTRATE 25 MG: 25 TABLET, FILM COATED ORAL at 12:50

## 2021-12-21 RX ADMIN — CEFTRIAXONE 1 G: 1 INJECTION, POWDER, FOR SOLUTION INTRAMUSCULAR; INTRAVENOUS at 14:09

## 2021-12-21 RX ADMIN — OXYCODONE AND ACETAMINOPHEN 1 TABLET: 5; 325 TABLET ORAL at 14:09

## 2021-12-21 RX ADMIN — ASPIRIN 81 MG: 81 TABLET, CHEWABLE ORAL at 12:50

## 2021-12-21 RX ADMIN — SODIUM CHLORIDE, PRESERVATIVE FREE 10 ML: 5 INJECTION INTRAVENOUS at 21:02

## 2021-12-21 RX ADMIN — PANTOPRAZOLE SODIUM 40 MG: 40 TABLET, DELAYED RELEASE ORAL at 12:50

## 2021-12-21 RX ADMIN — TAMSULOSIN HYDROCHLORIDE 0.4 MG: 0.4 CAPSULE ORAL at 12:50

## 2021-12-21 RX ADMIN — LEVOTHYROXINE SODIUM 125 MCG: 0.12 TABLET ORAL at 12:50

## 2021-12-21 RX ADMIN — OLANZAPINE 5 MG: 5 TABLET ORAL at 20:59

## 2021-12-21 RX ADMIN — INSULIN LISPRO 3 UNITS: 100 INJECTION, SOLUTION INTRAVENOUS; SUBCUTANEOUS at 17:33

## 2021-12-21 RX ADMIN — FOLIC ACID 1 MG: 1 TABLET ORAL at 12:50

## 2021-12-21 RX ADMIN — INSULIN LISPRO 8 UNITS: 100 INJECTION, SOLUTION INTRAVENOUS; SUBCUTANEOUS at 17:33

## 2021-12-21 RX ADMIN — HEPARIN SODIUM 4000 UNITS: 1000 INJECTION INTRAVENOUS; SUBCUTANEOUS at 12:28

## 2021-12-21 RX ADMIN — SERTRALINE HYDROCHLORIDE 50 MG: 50 TABLET, FILM COATED ORAL at 12:50

## 2021-12-22 LAB
ANION GAP SERPL CALCULATED.3IONS-SCNC: 8.8 MMOL/L (ref 5–15)
BASOPHILS # BLD AUTO: 0.06 10*3/MM3 (ref 0–0.2)
BASOPHILS NFR BLD AUTO: 0.6 % (ref 0–1.5)
BUN SERPL-MCNC: 27 MG/DL (ref 6–20)
BUN/CREAT SERPL: 9.3 (ref 7–25)
CALCIUM SPEC-SCNC: 7.9 MG/DL (ref 8.6–10.5)
CHLORIDE SERPL-SCNC: 102 MMOL/L (ref 98–107)
CO2 SERPL-SCNC: 25.2 MMOL/L (ref 22–29)
CREAT SERPL-MCNC: 2.91 MG/DL (ref 0.57–1)
DEPRECATED RDW RBC AUTO: 48.3 FL (ref 37–54)
EOSINOPHIL # BLD AUTO: 0.16 10*3/MM3 (ref 0–0.4)
EOSINOPHIL NFR BLD AUTO: 1.5 % (ref 0.3–6.2)
ERYTHROCYTE [DISTWIDTH] IN BLOOD BY AUTOMATED COUNT: 15.2 % (ref 12.3–15.4)
GFR SERPL CREATININE-BSD FRML MDRD: 17 ML/MIN/1.73
GLUCOSE BLDC GLUCOMTR-MCNC: 123 MG/DL (ref 70–130)
GLUCOSE BLDC GLUCOMTR-MCNC: 149 MG/DL (ref 70–130)
GLUCOSE BLDC GLUCOMTR-MCNC: 165 MG/DL (ref 70–130)
GLUCOSE BLDC GLUCOMTR-MCNC: 194 MG/DL (ref 70–130)
GLUCOSE SERPL-MCNC: 117 MG/DL (ref 65–99)
HCT VFR BLD AUTO: 25.7 % (ref 34–46.6)
HGB BLD-MCNC: 8.2 G/DL (ref 12–15.9)
IMM GRANULOCYTES # BLD AUTO: 0.33 10*3/MM3 (ref 0–0.05)
IMM GRANULOCYTES NFR BLD AUTO: 3 % (ref 0–0.5)
LYMPHOCYTES # BLD AUTO: 1.64 10*3/MM3 (ref 0.7–3.1)
LYMPHOCYTES NFR BLD AUTO: 15.1 % (ref 19.6–45.3)
MCH RBC QN AUTO: 27.9 PG (ref 26.6–33)
MCHC RBC AUTO-ENTMCNC: 31.9 G/DL (ref 31.5–35.7)
MCV RBC AUTO: 87.4 FL (ref 79–97)
MONOCYTES # BLD AUTO: 1.02 10*3/MM3 (ref 0.1–0.9)
MONOCYTES NFR BLD AUTO: 9.4 % (ref 5–12)
NEUTROPHILS NFR BLD AUTO: 7.66 10*3/MM3 (ref 1.7–7)
NEUTROPHILS NFR BLD AUTO: 70.4 % (ref 42.7–76)
NRBC BLD AUTO-RTO: 0 /100 WBC (ref 0–0.2)
PLATELET # BLD AUTO: 264 10*3/MM3 (ref 140–450)
PMV BLD AUTO: 11.8 FL (ref 6–12)
POTASSIUM SERPL-SCNC: 4.4 MMOL/L (ref 3.5–5.2)
RBC # BLD AUTO: 2.94 10*6/MM3 (ref 3.77–5.28)
SODIUM SERPL-SCNC: 136 MMOL/L (ref 136–145)
WBC NRBC COR # BLD: 10.87 10*3/MM3 (ref 3.4–10.8)

## 2021-12-22 PROCEDURE — 25010000002 CEFTRIAXONE PER 250 MG: Performed by: INTERNAL MEDICINE

## 2021-12-22 PROCEDURE — 97110 THERAPEUTIC EXERCISES: CPT

## 2021-12-22 PROCEDURE — 97116 GAIT TRAINING THERAPY: CPT

## 2021-12-22 PROCEDURE — 82962 GLUCOSE BLOOD TEST: CPT

## 2021-12-22 PROCEDURE — 80048 BASIC METABOLIC PNL TOTAL CA: CPT | Performed by: INTERNAL MEDICINE

## 2021-12-22 PROCEDURE — 63710000001 INSULIN LISPRO (HUMAN) PER 5 UNITS: Performed by: INTERNAL MEDICINE

## 2021-12-22 PROCEDURE — 97530 THERAPEUTIC ACTIVITIES: CPT

## 2021-12-22 PROCEDURE — 97535 SELF CARE MNGMENT TRAINING: CPT

## 2021-12-22 PROCEDURE — 63710000001 INSULIN GLARGINE PER 5 UNITS: Performed by: INTERNAL MEDICINE

## 2021-12-22 PROCEDURE — 85025 COMPLETE CBC W/AUTO DIFF WBC: CPT | Performed by: INTERNAL MEDICINE

## 2021-12-22 RX ORDER — OXYCODONE HYDROCHLORIDE AND ACETAMINOPHEN 5; 325 MG/1; MG/1
1 TABLET ORAL EVERY 6 HOURS PRN
Qty: 12 TABLET | Refills: 0 | Status: SHIPPED | OUTPATIENT
Start: 2021-12-22 | End: 2021-12-29 | Stop reason: HOSPADM

## 2021-12-22 RX ADMIN — OXYCODONE AND ACETAMINOPHEN 1 TABLET: 5; 325 TABLET ORAL at 13:51

## 2021-12-22 RX ADMIN — SODIUM CHLORIDE, PRESERVATIVE FREE 10 ML: 5 INJECTION INTRAVENOUS at 20:26

## 2021-12-22 RX ADMIN — LEVOTHYROXINE SODIUM 125 MCG: 0.12 TABLET ORAL at 08:22

## 2021-12-22 RX ADMIN — INSULIN LISPRO 3 UNITS: 100 INJECTION, SOLUTION INTRAVENOUS; SUBCUTANEOUS at 08:23

## 2021-12-22 RX ADMIN — INSULIN LISPRO 2 UNITS: 100 INJECTION, SOLUTION INTRAVENOUS; SUBCUTANEOUS at 17:17

## 2021-12-22 RX ADMIN — SERTRALINE HYDROCHLORIDE 50 MG: 50 TABLET, FILM COATED ORAL at 08:22

## 2021-12-22 RX ADMIN — PANTOPRAZOLE SODIUM 40 MG: 40 TABLET, DELAYED RELEASE ORAL at 08:24

## 2021-12-22 RX ADMIN — INSULIN LISPRO 3 UNITS: 100 INJECTION, SOLUTION INTRAVENOUS; SUBCUTANEOUS at 12:18

## 2021-12-22 RX ADMIN — CEFTRIAXONE 1 G: 1 INJECTION, POWDER, FOR SOLUTION INTRAMUSCULAR; INTRAVENOUS at 15:10

## 2021-12-22 RX ADMIN — METOPROLOL TARTRATE 25 MG: 25 TABLET, FILM COATED ORAL at 08:27

## 2021-12-22 RX ADMIN — AMLODIPINE BESYLATE 10 MG: 10 TABLET ORAL at 08:22

## 2021-12-22 RX ADMIN — OXYCODONE AND ACETAMINOPHEN 1 TABLET: 5; 325 TABLET ORAL at 22:50

## 2021-12-22 RX ADMIN — MULTIPLE VITAMINS W/ MINERALS TAB 1 TABLET: TAB at 08:22

## 2021-12-22 RX ADMIN — OLANZAPINE 5 MG: 5 TABLET ORAL at 08:22

## 2021-12-22 RX ADMIN — ROPINIROLE HYDROCHLORIDE 0.75 MG: 0.5 TABLET, FILM COATED ORAL at 20:26

## 2021-12-22 RX ADMIN — OLANZAPINE 5 MG: 5 TABLET ORAL at 20:26

## 2021-12-22 RX ADMIN — INSULIN LISPRO 3 UNITS: 100 INJECTION, SOLUTION INTRAVENOUS; SUBCUTANEOUS at 17:17

## 2021-12-22 RX ADMIN — ASPIRIN 81 MG: 81 TABLET, CHEWABLE ORAL at 08:22

## 2021-12-22 RX ADMIN — INSULIN GLARGINE-YFGN 10 UNITS: 100 INJECTION, SOLUTION SUBCUTANEOUS at 07:28

## 2021-12-22 RX ADMIN — METOPROLOL TARTRATE 25 MG: 25 TABLET, FILM COATED ORAL at 20:26

## 2021-12-22 RX ADMIN — TAMSULOSIN HYDROCHLORIDE 0.4 MG: 0.4 CAPSULE ORAL at 08:22

## 2021-12-22 RX ADMIN — FOLIC ACID 1 MG: 1 TABLET ORAL at 08:22

## 2021-12-23 LAB
ALBUMIN SERPL-MCNC: 2.1 G/DL (ref 3.5–5.2)
ANION GAP SERPL CALCULATED.3IONS-SCNC: 12.5 MMOL/L (ref 5–15)
BASOPHILS # BLD AUTO: 0.08 10*3/MM3 (ref 0–0.2)
BASOPHILS NFR BLD AUTO: 0.7 % (ref 0–1.5)
BUN SERPL-MCNC: 43 MG/DL (ref 6–20)
BUN/CREAT SERPL: 13 (ref 7–25)
CALCIUM SPEC-SCNC: 8 MG/DL (ref 8.6–10.5)
CHLORIDE SERPL-SCNC: 99 MMOL/L (ref 98–107)
CO2 SERPL-SCNC: 22.5 MMOL/L (ref 22–29)
CREAT SERPL-MCNC: 3.32 MG/DL (ref 0.57–1)
DEPRECATED RDW RBC AUTO: 46 FL (ref 37–54)
EOSINOPHIL # BLD AUTO: 0.23 10*3/MM3 (ref 0–0.4)
EOSINOPHIL NFR BLD AUTO: 2.1 % (ref 0.3–6.2)
ERYTHROCYTE [DISTWIDTH] IN BLOOD BY AUTOMATED COUNT: 14.8 % (ref 12.3–15.4)
GFR SERPL CREATININE-BSD FRML MDRD: 14 ML/MIN/1.73
GFR SERPL CREATININE-BSD FRML MDRD: ABNORMAL ML/MIN/{1.73_M2}
GLUCOSE BLDC GLUCOMTR-MCNC: 184 MG/DL (ref 70–130)
GLUCOSE BLDC GLUCOMTR-MCNC: 223 MG/DL (ref 70–130)
GLUCOSE BLDC GLUCOMTR-MCNC: 227 MG/DL (ref 70–130)
GLUCOSE SERPL-MCNC: 234 MG/DL (ref 65–99)
HCT VFR BLD AUTO: 26.5 % (ref 34–46.6)
HGB BLD-MCNC: 8.4 G/DL (ref 12–15.9)
IMM GRANULOCYTES # BLD AUTO: 0.39 10*3/MM3 (ref 0–0.05)
IMM GRANULOCYTES NFR BLD AUTO: 3.5 % (ref 0–0.5)
LYMPHOCYTES # BLD AUTO: 1.6 10*3/MM3 (ref 0.7–3.1)
LYMPHOCYTES NFR BLD AUTO: 14.5 % (ref 19.6–45.3)
MAGNESIUM SERPL-MCNC: 2 MG/DL (ref 1.6–2.6)
MCH RBC QN AUTO: 27.4 PG (ref 26.6–33)
MCHC RBC AUTO-ENTMCNC: 31.7 G/DL (ref 31.5–35.7)
MCV RBC AUTO: 86.3 FL (ref 79–97)
MONOCYTES # BLD AUTO: 0.75 10*3/MM3 (ref 0.1–0.9)
MONOCYTES NFR BLD AUTO: 6.8 % (ref 5–12)
NEUTROPHILS NFR BLD AUTO: 72.4 % (ref 42.7–76)
NEUTROPHILS NFR BLD AUTO: 8 10*3/MM3 (ref 1.7–7)
NRBC BLD AUTO-RTO: 0 /100 WBC (ref 0–0.2)
PHOSPHATE SERPL-MCNC: 5.4 MG/DL (ref 2.5–4.5)
PLATELET # BLD AUTO: 339 10*3/MM3 (ref 140–450)
PMV BLD AUTO: 11.3 FL (ref 6–12)
POTASSIUM SERPL-SCNC: 4.9 MMOL/L (ref 3.5–5.2)
RBC # BLD AUTO: 3.07 10*6/MM3 (ref 3.77–5.28)
SODIUM SERPL-SCNC: 134 MMOL/L (ref 136–145)
WBC NRBC COR # BLD: 11.05 10*3/MM3 (ref 3.4–10.8)

## 2021-12-23 PROCEDURE — 25010000002 CEFTRIAXONE PER 250 MG: Performed by: INTERNAL MEDICINE

## 2021-12-23 PROCEDURE — 63710000001 INSULIN LISPRO (HUMAN) PER 5 UNITS: Performed by: INTERNAL MEDICINE

## 2021-12-23 PROCEDURE — 83735 ASSAY OF MAGNESIUM: CPT | Performed by: INTERNAL MEDICINE

## 2021-12-23 PROCEDURE — 25010000002 NA FERRIC GLUC CPLX PER 12.5 MG: Performed by: INTERNAL MEDICINE

## 2021-12-23 PROCEDURE — 85025 COMPLETE CBC W/AUTO DIFF WBC: CPT | Performed by: INTERNAL MEDICINE

## 2021-12-23 PROCEDURE — 63710000001 INSULIN GLARGINE PER 5 UNITS: Performed by: INTERNAL MEDICINE

## 2021-12-23 PROCEDURE — 25010000002 HEPARIN (PORCINE) PER 1000 UNITS: Performed by: INTERNAL MEDICINE

## 2021-12-23 PROCEDURE — 5A1D70Z PERFORMANCE OF URINARY FILTRATION, INTERMITTENT, LESS THAN 6 HOURS PER DAY: ICD-10-PCS | Performed by: INTERNAL MEDICINE

## 2021-12-23 PROCEDURE — 82962 GLUCOSE BLOOD TEST: CPT

## 2021-12-23 PROCEDURE — 97110 THERAPEUTIC EXERCISES: CPT

## 2021-12-23 PROCEDURE — 80069 RENAL FUNCTION PANEL: CPT | Performed by: INTERNAL MEDICINE

## 2021-12-23 RX ORDER — ALBUMIN (HUMAN) 12.5 G/50ML
12.5 SOLUTION INTRAVENOUS AS NEEDED
Status: ACTIVE | OUTPATIENT
Start: 2021-12-24 | End: 2021-12-24

## 2021-12-23 RX ORDER — ALBUMIN (HUMAN) 12.5 G/50ML
12.5 SOLUTION INTRAVENOUS AS NEEDED
Status: ACTIVE | OUTPATIENT
Start: 2021-12-23 | End: 2021-12-24

## 2021-12-23 RX ORDER — ALBUMIN (HUMAN) 12.5 G/50ML
12.5 SOLUTION INTRAVENOUS AS NEEDED
Status: ACTIVE | OUTPATIENT
Start: 2021-12-23 | End: 2021-12-23

## 2021-12-23 RX ORDER — HEPARIN SODIUM 1000 [USP'U]/ML
3800 INJECTION, SOLUTION INTRAVENOUS; SUBCUTANEOUS ONCE
Status: COMPLETED | OUTPATIENT
Start: 2021-12-23 | End: 2021-12-23

## 2021-12-23 RX ADMIN — Medication 3 MG: at 00:12

## 2021-12-23 RX ADMIN — INSULIN GLARGINE-YFGN 10 UNITS: 100 INJECTION, SOLUTION SUBCUTANEOUS at 07:28

## 2021-12-23 RX ADMIN — MULTIPLE VITAMINS W/ MINERALS TAB 1 TABLET: TAB at 15:12

## 2021-12-23 RX ADMIN — INSULIN LISPRO 2 UNITS: 100 INJECTION, SOLUTION INTRAVENOUS; SUBCUTANEOUS at 17:48

## 2021-12-23 RX ADMIN — ASPIRIN 81 MG: 81 TABLET, CHEWABLE ORAL at 15:12

## 2021-12-23 RX ADMIN — METOPROLOL TARTRATE 25 MG: 25 TABLET, FILM COATED ORAL at 15:16

## 2021-12-23 RX ADMIN — CEFTRIAXONE 1 G: 1 INJECTION, POWDER, FOR SOLUTION INTRAMUSCULAR; INTRAVENOUS at 15:11

## 2021-12-23 RX ADMIN — OLANZAPINE 5 MG: 5 TABLET ORAL at 15:11

## 2021-12-23 RX ADMIN — HEPARIN SODIUM 3800 UNITS: 1000 INJECTION INTRAVENOUS; SUBCUTANEOUS at 13:12

## 2021-12-23 RX ADMIN — METOPROLOL TARTRATE 25 MG: 25 TABLET, FILM COATED ORAL at 20:48

## 2021-12-23 RX ADMIN — PANTOPRAZOLE SODIUM 40 MG: 40 TABLET, DELAYED RELEASE ORAL at 15:16

## 2021-12-23 RX ADMIN — INSULIN LISPRO 3 UNITS: 100 INJECTION, SOLUTION INTRAVENOUS; SUBCUTANEOUS at 17:49

## 2021-12-23 RX ADMIN — OLANZAPINE 5 MG: 5 TABLET ORAL at 20:48

## 2021-12-23 RX ADMIN — OXYCODONE AND ACETAMINOPHEN 1 TABLET: 5; 325 TABLET ORAL at 20:48

## 2021-12-23 RX ADMIN — ROPINIROLE HYDROCHLORIDE 0.75 MG: 0.5 TABLET, FILM COATED ORAL at 20:48

## 2021-12-23 RX ADMIN — FOLIC ACID 1 MG: 1 TABLET ORAL at 15:12

## 2021-12-23 RX ADMIN — SODIUM CHLORIDE 125 MG: 9 INJECTION, SOLUTION INTRAVENOUS at 15:18

## 2021-12-23 RX ADMIN — Medication 3 MG: at 22:18

## 2021-12-23 RX ADMIN — LEVOTHYROXINE SODIUM 125 MCG: 0.12 TABLET ORAL at 15:12

## 2021-12-23 RX ADMIN — AMLODIPINE BESYLATE 10 MG: 10 TABLET ORAL at 15:12

## 2021-12-23 RX ADMIN — INSULIN LISPRO 3 UNITS: 100 INJECTION, SOLUTION INTRAVENOUS; SUBCUTANEOUS at 07:56

## 2021-12-23 RX ADMIN — INSULIN LISPRO 4 UNITS: 100 INJECTION, SOLUTION INTRAVENOUS; SUBCUTANEOUS at 07:56

## 2021-12-23 RX ADMIN — TAMSULOSIN HYDROCHLORIDE 0.4 MG: 0.4 CAPSULE ORAL at 15:12

## 2021-12-23 RX ADMIN — SERTRALINE HYDROCHLORIDE 50 MG: 50 TABLET, FILM COATED ORAL at 15:12

## 2021-12-24 LAB
ALBUMIN SERPL-MCNC: 2.2 G/DL (ref 3.5–5.2)
ANION GAP SERPL CALCULATED.3IONS-SCNC: 8.1 MMOL/L (ref 5–15)
BASOPHILS # BLD AUTO: 0.07 10*3/MM3 (ref 0–0.2)
BASOPHILS NFR BLD AUTO: 0.7 % (ref 0–1.5)
BUN SERPL-MCNC: 29 MG/DL (ref 6–20)
BUN/CREAT SERPL: 12.2 (ref 7–25)
CALCIUM SPEC-SCNC: 7.6 MG/DL (ref 8.6–10.5)
CHLORIDE SERPL-SCNC: 97 MMOL/L (ref 98–107)
CO2 SERPL-SCNC: 27.9 MMOL/L (ref 22–29)
CREAT SERPL-MCNC: 2.37 MG/DL (ref 0.57–1)
DEPRECATED RDW RBC AUTO: 47.9 FL (ref 37–54)
EOSINOPHIL # BLD AUTO: 0.11 10*3/MM3 (ref 0–0.4)
EOSINOPHIL NFR BLD AUTO: 1.1 % (ref 0.3–6.2)
ERYTHROCYTE [DISTWIDTH] IN BLOOD BY AUTOMATED COUNT: 15 % (ref 12.3–15.4)
GFR SERPL CREATININE-BSD FRML MDRD: 21 ML/MIN/1.73
GLUCOSE BLDC GLUCOMTR-MCNC: 110 MG/DL (ref 70–130)
GLUCOSE BLDC GLUCOMTR-MCNC: 167 MG/DL (ref 70–130)
GLUCOSE BLDC GLUCOMTR-MCNC: 226 MG/DL (ref 70–130)
GLUCOSE BLDC GLUCOMTR-MCNC: 290 MG/DL (ref 70–130)
GLUCOSE SERPL-MCNC: 311 MG/DL (ref 65–99)
HCT VFR BLD AUTO: 26.7 % (ref 34–46.6)
HGB BLD-MCNC: 8.1 G/DL (ref 12–15.9)
IMM GRANULOCYTES # BLD AUTO: 0.38 10*3/MM3 (ref 0–0.05)
IMM GRANULOCYTES NFR BLD AUTO: 3.8 % (ref 0–0.5)
LYMPHOCYTES # BLD AUTO: 1.47 10*3/MM3 (ref 0.7–3.1)
LYMPHOCYTES NFR BLD AUTO: 14.8 % (ref 19.6–45.3)
MCH RBC QN AUTO: 27 PG (ref 26.6–33)
MCHC RBC AUTO-ENTMCNC: 30.3 G/DL (ref 31.5–35.7)
MCV RBC AUTO: 89 FL (ref 79–97)
MONOCYTES # BLD AUTO: 0.84 10*3/MM3 (ref 0.1–0.9)
MONOCYTES NFR BLD AUTO: 8.4 % (ref 5–12)
NEUTROPHILS NFR BLD AUTO: 7.08 10*3/MM3 (ref 1.7–7)
NEUTROPHILS NFR BLD AUTO: 71.2 % (ref 42.7–76)
NRBC BLD AUTO-RTO: 0 /100 WBC (ref 0–0.2)
PHOSPHATE SERPL-MCNC: 3.6 MG/DL (ref 2.5–4.5)
PLATELET # BLD AUTO: 297 10*3/MM3 (ref 140–450)
PMV BLD AUTO: 11.2 FL (ref 6–12)
POTASSIUM SERPL-SCNC: 4.9 MMOL/L (ref 3.5–5.2)
RBC # BLD AUTO: 3 10*6/MM3 (ref 3.77–5.28)
SODIUM SERPL-SCNC: 133 MMOL/L (ref 136–145)
WBC NRBC COR # BLD: 9.95 10*3/MM3 (ref 3.4–10.8)

## 2021-12-24 PROCEDURE — 5A1D70Z PERFORMANCE OF URINARY FILTRATION, INTERMITTENT, LESS THAN 6 HOURS PER DAY: ICD-10-PCS | Performed by: INTERNAL MEDICINE

## 2021-12-24 PROCEDURE — 63710000001 INSULIN LISPRO (HUMAN) PER 5 UNITS: Performed by: INTERNAL MEDICINE

## 2021-12-24 PROCEDURE — 82962 GLUCOSE BLOOD TEST: CPT

## 2021-12-24 PROCEDURE — 97110 THERAPEUTIC EXERCISES: CPT

## 2021-12-24 PROCEDURE — 85025 COMPLETE CBC W/AUTO DIFF WBC: CPT | Performed by: INTERNAL MEDICINE

## 2021-12-24 PROCEDURE — 25010000002 HEPARIN (PORCINE) PER 1000 UNITS: Performed by: INTERNAL MEDICINE

## 2021-12-24 PROCEDURE — 80069 RENAL FUNCTION PANEL: CPT | Performed by: INTERNAL MEDICINE

## 2021-12-24 RX ORDER — HEPARIN SODIUM 1000 [USP'U]/ML
4000 INJECTION, SOLUTION INTRAVENOUS; SUBCUTANEOUS AS NEEDED
Status: DISCONTINUED | OUTPATIENT
Start: 2021-12-24 | End: 2021-12-29 | Stop reason: HOSPADM

## 2021-12-24 RX ORDER — AMLODIPINE BESYLATE 5 MG/1
5 TABLET ORAL
Status: DISCONTINUED | OUTPATIENT
Start: 2021-12-25 | End: 2021-12-29 | Stop reason: HOSPADM

## 2021-12-24 RX ORDER — OXYCODONE HYDROCHLORIDE AND ACETAMINOPHEN 5; 325 MG/1; MG/1
1 TABLET ORAL ONCE
Status: COMPLETED | OUTPATIENT
Start: 2021-12-25 | End: 2021-12-24

## 2021-12-24 RX ADMIN — MULTIPLE VITAMINS W/ MINERALS TAB 1 TABLET: TAB at 08:29

## 2021-12-24 RX ADMIN — ROPINIROLE HYDROCHLORIDE 0.75 MG: 0.5 TABLET, FILM COATED ORAL at 21:13

## 2021-12-24 RX ADMIN — INSULIN LISPRO 3 UNITS: 100 INJECTION, SOLUTION INTRAVENOUS; SUBCUTANEOUS at 08:29

## 2021-12-24 RX ADMIN — OXYCODONE AND ACETAMINOPHEN 1 TABLET: 5; 325 TABLET ORAL at 10:25

## 2021-12-24 RX ADMIN — METOPROLOL TARTRATE 25 MG: 25 TABLET, FILM COATED ORAL at 08:28

## 2021-12-24 RX ADMIN — ASPIRIN 81 MG: 81 TABLET, CHEWABLE ORAL at 08:29

## 2021-12-24 RX ADMIN — INSULIN LISPRO 3 UNITS: 100 INJECTION, SOLUTION INTRAVENOUS; SUBCUTANEOUS at 12:17

## 2021-12-24 RX ADMIN — SODIUM CHLORIDE, PRESERVATIVE FREE 10 ML: 5 INJECTION INTRAVENOUS at 21:14

## 2021-12-24 RX ADMIN — OLANZAPINE 5 MG: 5 TABLET ORAL at 08:29

## 2021-12-24 RX ADMIN — HEPARIN SODIUM 4000 UNITS: 1000 INJECTION INTRAVENOUS; SUBCUTANEOUS at 10:26

## 2021-12-24 RX ADMIN — LEVOTHYROXINE SODIUM 125 MCG: 0.12 TABLET ORAL at 08:28

## 2021-12-24 RX ADMIN — METOPROLOL TARTRATE 25 MG: 25 TABLET, FILM COATED ORAL at 21:13

## 2021-12-24 RX ADMIN — OXYCODONE AND ACETAMINOPHEN 1 TABLET: 5; 325 TABLET ORAL at 23:33

## 2021-12-24 RX ADMIN — AMLODIPINE BESYLATE 10 MG: 10 TABLET ORAL at 08:28

## 2021-12-24 RX ADMIN — PANTOPRAZOLE SODIUM 40 MG: 40 TABLET, DELAYED RELEASE ORAL at 08:28

## 2021-12-24 RX ADMIN — OXYCODONE AND ACETAMINOPHEN 1 TABLET: 5; 325 TABLET ORAL at 21:13

## 2021-12-24 RX ADMIN — Medication 3 MG: at 21:13

## 2021-12-24 RX ADMIN — SERTRALINE HYDROCHLORIDE 50 MG: 50 TABLET, FILM COATED ORAL at 08:28

## 2021-12-24 RX ADMIN — INSULIN LISPRO 6 UNITS: 100 INJECTION, SOLUTION INTRAVENOUS; SUBCUTANEOUS at 08:28

## 2021-12-24 RX ADMIN — INSULIN LISPRO 3 UNITS: 100 INJECTION, SOLUTION INTRAVENOUS; SUBCUTANEOUS at 17:15

## 2021-12-24 RX ADMIN — TAMSULOSIN HYDROCHLORIDE 0.4 MG: 0.4 CAPSULE ORAL at 08:28

## 2021-12-24 RX ADMIN — FOLIC ACID 1 MG: 1 TABLET ORAL at 08:29

## 2021-12-24 RX ADMIN — INSULIN LISPRO 2 UNITS: 100 INJECTION, SOLUTION INTRAVENOUS; SUBCUTANEOUS at 17:15

## 2021-12-24 RX ADMIN — OLANZAPINE 5 MG: 5 TABLET ORAL at 21:13

## 2021-12-24 RX ADMIN — INSULIN GLARGINE-YFGN 10 UNITS: 100 INJECTION, SOLUTION SUBCUTANEOUS at 07:09

## 2021-12-25 PROBLEM — E87.70 VOLUME OVERLOAD: Status: ACTIVE | Noted: 2021-12-25

## 2021-12-25 PROBLEM — R82.90 ABNORMAL URINALYSIS: Status: ACTIVE | Noted: 2021-12-20

## 2021-12-25 PROBLEM — I50.33 ACUTE ON CHRONIC DIASTOLIC CHF (CONGESTIVE HEART FAILURE): Status: ACTIVE | Noted: 2021-12-25

## 2021-12-25 LAB
ALBUMIN SERPL-MCNC: 2.2 G/DL (ref 3.5–5.2)
ANION GAP SERPL CALCULATED.3IONS-SCNC: 6.9 MMOL/L (ref 5–15)
BASOPHILS # BLD AUTO: 0.07 10*3/MM3 (ref 0–0.2)
BASOPHILS NFR BLD AUTO: 0.7 % (ref 0–1.5)
BUN SERPL-MCNC: 45 MG/DL (ref 6–20)
BUN/CREAT SERPL: 12 (ref 7–25)
CALCIUM SPEC-SCNC: 7.8 MG/DL (ref 8.6–10.5)
CHLORIDE SERPL-SCNC: 102 MMOL/L (ref 98–107)
CO2 SERPL-SCNC: 25.1 MMOL/L (ref 22–29)
CREAT SERPL-MCNC: 3.75 MG/DL (ref 0.57–1)
DEPRECATED RDW RBC AUTO: 49.7 FL (ref 37–54)
EOSINOPHIL # BLD AUTO: 0.21 10*3/MM3 (ref 0–0.4)
EOSINOPHIL NFR BLD AUTO: 2 % (ref 0.3–6.2)
ERYTHROCYTE [DISTWIDTH] IN BLOOD BY AUTOMATED COUNT: 15.4 % (ref 12.3–15.4)
GFR SERPL CREATININE-BSD FRML MDRD: 12 ML/MIN/1.73
GFR SERPL CREATININE-BSD FRML MDRD: ABNORMAL ML/MIN/{1.73_M2}
GLUCOSE BLDC GLUCOMTR-MCNC: 104 MG/DL (ref 70–130)
GLUCOSE BLDC GLUCOMTR-MCNC: 300 MG/DL (ref 70–130)
GLUCOSE BLDC GLUCOMTR-MCNC: 340 MG/DL (ref 70–130)
GLUCOSE BLDC GLUCOMTR-MCNC: 72 MG/DL (ref 70–130)
GLUCOSE SERPL-MCNC: 153 MG/DL (ref 65–99)
HCT VFR BLD AUTO: 26 % (ref 34–46.6)
HGB BLD-MCNC: 8 G/DL (ref 12–15.9)
IMM GRANULOCYTES # BLD AUTO: 0.5 10*3/MM3 (ref 0–0.05)
IMM GRANULOCYTES NFR BLD AUTO: 4.8 % (ref 0–0.5)
LYMPHOCYTES # BLD AUTO: 1.74 10*3/MM3 (ref 0.7–3.1)
LYMPHOCYTES NFR BLD AUTO: 16.8 % (ref 19.6–45.3)
MCH RBC QN AUTO: 27.6 PG (ref 26.6–33)
MCHC RBC AUTO-ENTMCNC: 30.8 G/DL (ref 31.5–35.7)
MCV RBC AUTO: 89.7 FL (ref 79–97)
MONOCYTES # BLD AUTO: 1.05 10*3/MM3 (ref 0.1–0.9)
MONOCYTES NFR BLD AUTO: 10.1 % (ref 5–12)
NEUTROPHILS NFR BLD AUTO: 6.78 10*3/MM3 (ref 1.7–7)
NEUTROPHILS NFR BLD AUTO: 65.6 % (ref 42.7–76)
NRBC BLD AUTO-RTO: 0.1 /100 WBC (ref 0–0.2)
PHOSPHATE SERPL-MCNC: 4.5 MG/DL (ref 2.5–4.5)
PLATELET # BLD AUTO: 284 10*3/MM3 (ref 140–450)
PMV BLD AUTO: 10.9 FL (ref 6–12)
POTASSIUM SERPL-SCNC: 5.1 MMOL/L (ref 3.5–5.2)
RBC # BLD AUTO: 2.9 10*6/MM3 (ref 3.77–5.28)
SODIUM SERPL-SCNC: 134 MMOL/L (ref 136–145)
WBC NRBC COR # BLD: 10.35 10*3/MM3 (ref 3.4–10.8)

## 2021-12-25 PROCEDURE — 85025 COMPLETE CBC W/AUTO DIFF WBC: CPT | Performed by: INTERNAL MEDICINE

## 2021-12-25 PROCEDURE — 82962 GLUCOSE BLOOD TEST: CPT

## 2021-12-25 PROCEDURE — 80069 RENAL FUNCTION PANEL: CPT | Performed by: INTERNAL MEDICINE

## 2021-12-25 PROCEDURE — 63710000001 INSULIN LISPRO (HUMAN) PER 5 UNITS: Performed by: INTERNAL MEDICINE

## 2021-12-25 RX ADMIN — OLANZAPINE 5 MG: 5 TABLET ORAL at 09:19

## 2021-12-25 RX ADMIN — INSULIN GLARGINE-YFGN 25 UNITS: 100 INJECTION, SOLUTION SUBCUTANEOUS at 06:44

## 2021-12-25 RX ADMIN — AMLODIPINE BESYLATE 5 MG: 5 TABLET ORAL at 09:17

## 2021-12-25 RX ADMIN — INSULIN LISPRO 3 UNITS: 100 INJECTION, SOLUTION INTRAVENOUS; SUBCUTANEOUS at 09:17

## 2021-12-25 RX ADMIN — PANTOPRAZOLE SODIUM 40 MG: 40 TABLET, DELAYED RELEASE ORAL at 09:17

## 2021-12-25 RX ADMIN — FOLIC ACID 1 MG: 1 TABLET ORAL at 09:17

## 2021-12-25 RX ADMIN — ROPINIROLE HYDROCHLORIDE 0.75 MG: 0.5 TABLET, FILM COATED ORAL at 20:30

## 2021-12-25 RX ADMIN — SERTRALINE HYDROCHLORIDE 50 MG: 50 TABLET, FILM COATED ORAL at 09:17

## 2021-12-25 RX ADMIN — INSULIN LISPRO 3 UNITS: 100 INJECTION, SOLUTION INTRAVENOUS; SUBCUTANEOUS at 17:37

## 2021-12-25 RX ADMIN — OLANZAPINE 5 MG: 5 TABLET ORAL at 20:30

## 2021-12-25 RX ADMIN — TAMSULOSIN HYDROCHLORIDE 0.4 MG: 0.4 CAPSULE ORAL at 09:17

## 2021-12-25 RX ADMIN — METOPROLOL TARTRATE 25 MG: 25 TABLET, FILM COATED ORAL at 20:30

## 2021-12-25 RX ADMIN — SODIUM ZIRCONIUM CYCLOSILICATE 10 G: 10 POWDER, FOR SUSPENSION ORAL at 12:03

## 2021-12-25 RX ADMIN — SODIUM CHLORIDE, PRESERVATIVE FREE 10 ML: 5 INJECTION INTRAVENOUS at 20:30

## 2021-12-25 RX ADMIN — SODIUM ZIRCONIUM CYCLOSILICATE 10 G: 10 POWDER, FOR SUSPENSION ORAL at 20:30

## 2021-12-25 RX ADMIN — MULTIPLE VITAMINS W/ MINERALS TAB 1 TABLET: TAB at 09:17

## 2021-12-25 RX ADMIN — INSULIN LISPRO 7 UNITS: 100 INJECTION, SOLUTION INTRAVENOUS; SUBCUTANEOUS at 06:52

## 2021-12-25 RX ADMIN — OXYCODONE AND ACETAMINOPHEN 1 TABLET: 5; 325 TABLET ORAL at 22:02

## 2021-12-25 RX ADMIN — LEVOTHYROXINE SODIUM 125 MCG: 0.12 TABLET ORAL at 09:17

## 2021-12-25 RX ADMIN — ASPIRIN 81 MG: 81 TABLET, CHEWABLE ORAL at 09:17

## 2021-12-25 RX ADMIN — INSULIN LISPRO 3 UNITS: 100 INJECTION, SOLUTION INTRAVENOUS; SUBCUTANEOUS at 12:26

## 2021-12-26 ENCOUNTER — APPOINTMENT (OUTPATIENT)
Dept: GENERAL RADIOLOGY | Facility: HOSPITAL | Age: 56
End: 2021-12-26

## 2021-12-26 LAB
DEPRECATED RDW RBC AUTO: 47.9 FL (ref 37–54)
EOSINOPHIL # BLD MANUAL: 0.37 10*3/MM3 (ref 0–0.4)
EOSINOPHIL NFR BLD MANUAL: 3.1 % (ref 0.3–6.2)
ERYTHROCYTE [DISTWIDTH] IN BLOOD BY AUTOMATED COUNT: 15.3 % (ref 12.3–15.4)
GLUCOSE BLDC GLUCOMTR-MCNC: 124 MG/DL (ref 70–130)
GLUCOSE BLDC GLUCOMTR-MCNC: 182 MG/DL (ref 70–130)
GLUCOSE BLDC GLUCOMTR-MCNC: 251 MG/DL (ref 70–130)
GLUCOSE BLDC GLUCOMTR-MCNC: 268 MG/DL (ref 70–130)
HCT VFR BLD AUTO: 27.8 % (ref 34–46.6)
HGB BLD-MCNC: 8.9 G/DL (ref 12–15.9)
LYMPHOCYTES # BLD MANUAL: 0.85 10*3/MM3 (ref 0.7–3.1)
LYMPHOCYTES NFR BLD MANUAL: 6.2 % (ref 5–12)
MCH RBC QN AUTO: 27.6 PG (ref 26.6–33)
MCHC RBC AUTO-ENTMCNC: 32 G/DL (ref 31.5–35.7)
MCV RBC AUTO: 86.3 FL (ref 79–97)
MONOCYTES # BLD: 0.73 10*3/MM3 (ref 0.1–0.9)
NEUTROPHILS # BLD AUTO: 9.86 10*3/MM3 (ref 1.7–7)
NEUTROPHILS NFR BLD MANUAL: 83.5 % (ref 42.7–76)
PLAT MORPH BLD: NORMAL
PLATELET # BLD AUTO: 367 10*3/MM3 (ref 140–450)
PMV BLD AUTO: 10.7 FL (ref 6–12)
RBC # BLD AUTO: 3.22 10*6/MM3 (ref 3.77–5.28)
RBC MORPH BLD: NORMAL
VARIANT LYMPHS NFR BLD MANUAL: 7.2 % (ref 19.6–45.3)
WBC MORPH BLD: NORMAL
WBC NRBC COR # BLD: 11.81 10*3/MM3 (ref 3.4–10.8)

## 2021-12-26 PROCEDURE — 85025 COMPLETE CBC W/AUTO DIFF WBC: CPT | Performed by: INTERNAL MEDICINE

## 2021-12-26 PROCEDURE — 5A1D70Z PERFORMANCE OF URINARY FILTRATION, INTERMITTENT, LESS THAN 6 HOURS PER DAY: ICD-10-PCS | Performed by: INTERNAL MEDICINE

## 2021-12-26 PROCEDURE — 82962 GLUCOSE BLOOD TEST: CPT

## 2021-12-26 PROCEDURE — 73502 X-RAY EXAM HIP UNI 2-3 VIEWS: CPT

## 2021-12-26 PROCEDURE — 25010000002 HEPARIN (PORCINE) PER 1000 UNITS: Performed by: INTERNAL MEDICINE

## 2021-12-26 PROCEDURE — 85007 BL SMEAR W/DIFF WBC COUNT: CPT | Performed by: INTERNAL MEDICINE

## 2021-12-26 PROCEDURE — 63710000001 INSULIN LISPRO (HUMAN) PER 5 UNITS: Performed by: INTERNAL MEDICINE

## 2021-12-26 RX ADMIN — FOLIC ACID 1 MG: 1 TABLET ORAL at 12:10

## 2021-12-26 RX ADMIN — OXYCODONE AND ACETAMINOPHEN 1 TABLET: 5; 325 TABLET ORAL at 06:38

## 2021-12-26 RX ADMIN — AMLODIPINE BESYLATE 5 MG: 5 TABLET ORAL at 12:10

## 2021-12-26 RX ADMIN — LEVOTHYROXINE SODIUM 125 MCG: 0.12 TABLET ORAL at 12:10

## 2021-12-26 RX ADMIN — OXYCODONE AND ACETAMINOPHEN 1 TABLET: 5; 325 TABLET ORAL at 18:14

## 2021-12-26 RX ADMIN — ROPINIROLE HYDROCHLORIDE 0.75 MG: 0.5 TABLET, FILM COATED ORAL at 20:47

## 2021-12-26 RX ADMIN — INSULIN LISPRO 3 UNITS: 100 INJECTION, SOLUTION INTRAVENOUS; SUBCUTANEOUS at 18:14

## 2021-12-26 RX ADMIN — OLANZAPINE 5 MG: 5 TABLET ORAL at 20:47

## 2021-12-26 RX ADMIN — ASPIRIN 81 MG: 81 TABLET, CHEWABLE ORAL at 12:10

## 2021-12-26 RX ADMIN — METOPROLOL TARTRATE 25 MG: 25 TABLET, FILM COATED ORAL at 20:47

## 2021-12-26 RX ADMIN — HEPARIN SODIUM 3800 UNITS: 1000 INJECTION INTRAVENOUS; SUBCUTANEOUS at 10:45

## 2021-12-26 RX ADMIN — INSULIN LISPRO 3 UNITS: 100 INJECTION, SOLUTION INTRAVENOUS; SUBCUTANEOUS at 12:09

## 2021-12-26 RX ADMIN — INSULIN LISPRO 2 UNITS: 100 INJECTION, SOLUTION INTRAVENOUS; SUBCUTANEOUS at 12:10

## 2021-12-26 RX ADMIN — TAMSULOSIN HYDROCHLORIDE 0.4 MG: 0.4 CAPSULE ORAL at 12:12

## 2021-12-26 RX ADMIN — SERTRALINE HYDROCHLORIDE 50 MG: 50 TABLET, FILM COATED ORAL at 12:10

## 2021-12-26 RX ADMIN — INSULIN GLARGINE-YFGN 15 UNITS: 100 INJECTION, SOLUTION SUBCUTANEOUS at 12:13

## 2021-12-26 RX ADMIN — PANTOPRAZOLE SODIUM 40 MG: 40 TABLET, DELAYED RELEASE ORAL at 12:10

## 2021-12-26 RX ADMIN — MULTIPLE VITAMINS W/ MINERALS TAB 1 TABLET: TAB at 12:10

## 2021-12-27 LAB
ALBUMIN SERPL-MCNC: 2.4 G/DL (ref 3.5–5.2)
ANION GAP SERPL CALCULATED.3IONS-SCNC: 10.7 MMOL/L (ref 5–15)
BASOPHILS # BLD AUTO: 0.08 10*3/MM3 (ref 0–0.2)
BASOPHILS NFR BLD AUTO: 0.7 % (ref 0–1.5)
BUN SERPL-MCNC: 39 MG/DL (ref 6–20)
BUN/CREAT SERPL: 11.5 (ref 7–25)
CALCIUM SPEC-SCNC: 8.1 MG/DL (ref 8.6–10.5)
CHLORIDE SERPL-SCNC: 100 MMOL/L (ref 98–107)
CO2 SERPL-SCNC: 24.3 MMOL/L (ref 22–29)
CREAT SERPL-MCNC: 3.4 MG/DL (ref 0.57–1)
DEPRECATED RDW RBC AUTO: 47.1 FL (ref 37–54)
EOSINOPHIL # BLD AUTO: 0.21 10*3/MM3 (ref 0–0.4)
EOSINOPHIL NFR BLD AUTO: 1.8 % (ref 0.3–6.2)
ERYTHROCYTE [DISTWIDTH] IN BLOOD BY AUTOMATED COUNT: 15.1 % (ref 12.3–15.4)
GFR SERPL CREATININE-BSD FRML MDRD: 14 ML/MIN/1.73
GFR SERPL CREATININE-BSD FRML MDRD: ABNORMAL ML/MIN/{1.73_M2}
GLUCOSE BLDC GLUCOMTR-MCNC: 124 MG/DL (ref 70–130)
GLUCOSE BLDC GLUCOMTR-MCNC: 135 MG/DL (ref 70–130)
GLUCOSE BLDC GLUCOMTR-MCNC: 166 MG/DL (ref 70–130)
GLUCOSE BLDC GLUCOMTR-MCNC: 232 MG/DL (ref 70–130)
GLUCOSE SERPL-MCNC: 172 MG/DL (ref 65–99)
HCT VFR BLD AUTO: 27.2 % (ref 34–46.6)
HGB BLD-MCNC: 8.8 G/DL (ref 12–15.9)
IMM GRANULOCYTES # BLD AUTO: 0.51 10*3/MM3 (ref 0–0.05)
IMM GRANULOCYTES NFR BLD AUTO: 4.4 % (ref 0–0.5)
LYMPHOCYTES # BLD AUTO: 1.62 10*3/MM3 (ref 0.7–3.1)
LYMPHOCYTES NFR BLD AUTO: 14 % (ref 19.6–45.3)
MCH RBC QN AUTO: 27.8 PG (ref 26.6–33)
MCHC RBC AUTO-ENTMCNC: 32.4 G/DL (ref 31.5–35.7)
MCV RBC AUTO: 85.8 FL (ref 79–97)
MONOCYTES # BLD AUTO: 1.13 10*3/MM3 (ref 0.1–0.9)
MONOCYTES NFR BLD AUTO: 9.8 % (ref 5–12)
NEUTROPHILS NFR BLD AUTO: 69.3 % (ref 42.7–76)
NEUTROPHILS NFR BLD AUTO: 7.99 10*3/MM3 (ref 1.7–7)
NRBC BLD AUTO-RTO: 0 /100 WBC (ref 0–0.2)
PHOSPHATE SERPL-MCNC: 5.2 MG/DL (ref 2.5–4.5)
PLATELET # BLD AUTO: 386 10*3/MM3 (ref 140–450)
PMV BLD AUTO: 11.1 FL (ref 6–12)
POTASSIUM SERPL-SCNC: 5.3 MMOL/L (ref 3.5–5.2)
RBC # BLD AUTO: 3.17 10*6/MM3 (ref 3.77–5.28)
SODIUM SERPL-SCNC: 135 MMOL/L (ref 136–145)
WBC NRBC COR # BLD: 11.54 10*3/MM3 (ref 3.4–10.8)

## 2021-12-27 PROCEDURE — 63710000001 INSULIN LISPRO (HUMAN) PER 5 UNITS: Performed by: INTERNAL MEDICINE

## 2021-12-27 PROCEDURE — 25010000002 EPOETIN ALFA-EPBX 10000 UNIT/ML SOLUTION: Performed by: INTERNAL MEDICINE

## 2021-12-27 PROCEDURE — 85025 COMPLETE CBC W/AUTO DIFF WBC: CPT | Performed by: INTERNAL MEDICINE

## 2021-12-27 PROCEDURE — 97110 THERAPEUTIC EXERCISES: CPT

## 2021-12-27 PROCEDURE — 80069 RENAL FUNCTION PANEL: CPT | Performed by: INTERNAL MEDICINE

## 2021-12-27 PROCEDURE — 82962 GLUCOSE BLOOD TEST: CPT

## 2021-12-27 RX ORDER — OXYCODONE HYDROCHLORIDE AND ACETAMINOPHEN 5; 325 MG/1; MG/1
1 TABLET ORAL ONCE AS NEEDED
Status: COMPLETED | OUTPATIENT
Start: 2021-12-27 | End: 2021-12-27

## 2021-12-27 RX ADMIN — METOPROLOL TARTRATE 25 MG: 25 TABLET, FILM COATED ORAL at 20:05

## 2021-12-27 RX ADMIN — METOPROLOL TARTRATE 25 MG: 25 TABLET, FILM COATED ORAL at 08:44

## 2021-12-27 RX ADMIN — MULTIPLE VITAMINS W/ MINERALS TAB 1 TABLET: TAB at 08:44

## 2021-12-27 RX ADMIN — OLANZAPINE 5 MG: 5 TABLET ORAL at 20:05

## 2021-12-27 RX ADMIN — OLANZAPINE 5 MG: 5 TABLET ORAL at 08:44

## 2021-12-27 RX ADMIN — PANTOPRAZOLE SODIUM 40 MG: 40 TABLET, DELAYED RELEASE ORAL at 08:44

## 2021-12-27 RX ADMIN — ASPIRIN 81 MG: 81 TABLET, CHEWABLE ORAL at 08:44

## 2021-12-27 RX ADMIN — SERTRALINE HYDROCHLORIDE 50 MG: 50 TABLET, FILM COATED ORAL at 08:44

## 2021-12-27 RX ADMIN — LEVOTHYROXINE SODIUM 125 MCG: 0.12 TABLET ORAL at 08:44

## 2021-12-27 RX ADMIN — FOLIC ACID 1 MG: 1 TABLET ORAL at 08:44

## 2021-12-27 RX ADMIN — EPOETIN ALFA-EPBX 10000 UNITS: 10000 INJECTION, SOLUTION INTRAVENOUS; SUBCUTANEOUS at 12:06

## 2021-12-27 RX ADMIN — OXYCODONE AND ACETAMINOPHEN 1 TABLET: 5; 325 TABLET ORAL at 22:11

## 2021-12-27 RX ADMIN — TAMSULOSIN HYDROCHLORIDE 0.4 MG: 0.4 CAPSULE ORAL at 08:44

## 2021-12-27 RX ADMIN — INSULIN LISPRO 3 UNITS: 100 INJECTION, SOLUTION INTRAVENOUS; SUBCUTANEOUS at 08:44

## 2021-12-27 RX ADMIN — ACETAMINOPHEN 650 MG: 325 TABLET, FILM COATED ORAL at 18:34

## 2021-12-27 RX ADMIN — INSULIN LISPRO 2 UNITS: 100 INJECTION, SOLUTION INTRAVENOUS; SUBCUTANEOUS at 08:45

## 2021-12-27 RX ADMIN — Medication 3 MG: at 22:45

## 2021-12-27 RX ADMIN — INSULIN GLARGINE-YFGN 15 UNITS: 100 INJECTION, SOLUTION SUBCUTANEOUS at 06:21

## 2021-12-27 RX ADMIN — ROPINIROLE HYDROCHLORIDE 0.75 MG: 0.5 TABLET, FILM COATED ORAL at 20:05

## 2021-12-27 RX ADMIN — Medication 3 MG: at 00:22

## 2021-12-27 RX ADMIN — AMLODIPINE BESYLATE 5 MG: 5 TABLET ORAL at 08:44

## 2021-12-28 LAB
BASOPHILS # BLD AUTO: 0.09 10*3/MM3 (ref 0–0.2)
BASOPHILS NFR BLD AUTO: 0.9 % (ref 0–1.5)
DEPRECATED RDW RBC AUTO: 49.6 FL (ref 37–54)
EOSINOPHIL # BLD AUTO: 0.19 10*3/MM3 (ref 0–0.4)
EOSINOPHIL NFR BLD AUTO: 1.9 % (ref 0.3–6.2)
ERYTHROCYTE [DISTWIDTH] IN BLOOD BY AUTOMATED COUNT: 15 % (ref 12.3–15.4)
GLUCOSE BLDC GLUCOMTR-MCNC: 141 MG/DL (ref 70–130)
GLUCOSE BLDC GLUCOMTR-MCNC: 259 MG/DL (ref 70–130)
GLUCOSE BLDC GLUCOMTR-MCNC: 289 MG/DL (ref 70–130)
GLUCOSE BLDC GLUCOMTR-MCNC: 301 MG/DL (ref 70–130)
HCT VFR BLD AUTO: 25.9 % (ref 34–46.6)
HGB BLD-MCNC: 8 G/DL (ref 12–15.9)
IMM GRANULOCYTES # BLD AUTO: 0.4 10*3/MM3 (ref 0–0.05)
IMM GRANULOCYTES NFR BLD AUTO: 4 % (ref 0–0.5)
LYMPHOCYTES # BLD AUTO: 1.4 10*3/MM3 (ref 0.7–3.1)
LYMPHOCYTES NFR BLD AUTO: 14 % (ref 19.6–45.3)
MCH RBC QN AUTO: 27.7 PG (ref 26.6–33)
MCHC RBC AUTO-ENTMCNC: 30.9 G/DL (ref 31.5–35.7)
MCV RBC AUTO: 89.6 FL (ref 79–97)
MONOCYTES # BLD AUTO: 1.1 10*3/MM3 (ref 0.1–0.9)
MONOCYTES NFR BLD AUTO: 11 % (ref 5–12)
NEUTROPHILS NFR BLD AUTO: 6.81 10*3/MM3 (ref 1.7–7)
NEUTROPHILS NFR BLD AUTO: 68.2 % (ref 42.7–76)
NRBC BLD AUTO-RTO: 0.1 /100 WBC (ref 0–0.2)
PLATELET # BLD AUTO: 326 10*3/MM3 (ref 140–450)
PMV BLD AUTO: 10.9 FL (ref 6–12)
RBC # BLD AUTO: 2.89 10*6/MM3 (ref 3.77–5.28)
WBC NRBC COR # BLD: 9.99 10*3/MM3 (ref 3.4–10.8)

## 2021-12-28 PROCEDURE — 25010000002 HEPARIN (PORCINE) PER 1000 UNITS: Performed by: INTERNAL MEDICINE

## 2021-12-28 PROCEDURE — 82962 GLUCOSE BLOOD TEST: CPT

## 2021-12-28 PROCEDURE — 63710000001 INSULIN LISPRO (HUMAN) PER 5 UNITS: Performed by: INTERNAL MEDICINE

## 2021-12-28 PROCEDURE — 85025 COMPLETE CBC W/AUTO DIFF WBC: CPT | Performed by: INTERNAL MEDICINE

## 2021-12-28 PROCEDURE — 5A1D70Z PERFORMANCE OF URINARY FILTRATION, INTERMITTENT, LESS THAN 6 HOURS PER DAY: ICD-10-PCS | Performed by: INTERNAL MEDICINE

## 2021-12-28 PROCEDURE — 97535 SELF CARE MNGMENT TRAINING: CPT

## 2021-12-28 PROCEDURE — 97110 THERAPEUTIC EXERCISES: CPT

## 2021-12-28 RX ORDER — ALBUMIN (HUMAN) 12.5 G/50ML
12.5 SOLUTION INTRAVENOUS AS NEEDED
Status: ACTIVE | OUTPATIENT
Start: 2021-12-28 | End: 2021-12-28

## 2021-12-28 RX ADMIN — ROPINIROLE HYDROCHLORIDE 0.75 MG: 0.5 TABLET, FILM COATED ORAL at 20:37

## 2021-12-28 RX ADMIN — LEVOTHYROXINE SODIUM 125 MCG: 0.12 TABLET ORAL at 14:21

## 2021-12-28 RX ADMIN — TAMSULOSIN HYDROCHLORIDE 0.4 MG: 0.4 CAPSULE ORAL at 14:12

## 2021-12-28 RX ADMIN — FOLIC ACID 1 MG: 1 TABLET ORAL at 14:12

## 2021-12-28 RX ADMIN — MULTIPLE VITAMINS W/ MINERALS TAB 1 TABLET: TAB at 14:12

## 2021-12-28 RX ADMIN — METOPROLOL TARTRATE 25 MG: 25 TABLET, FILM COATED ORAL at 20:37

## 2021-12-28 RX ADMIN — OLANZAPINE 5 MG: 5 TABLET ORAL at 14:12

## 2021-12-28 RX ADMIN — INSULIN GLARGINE-YFGN 15 UNITS: 100 INJECTION, SOLUTION SUBCUTANEOUS at 06:43

## 2021-12-28 RX ADMIN — METOPROLOL TARTRATE 25 MG: 25 TABLET, FILM COATED ORAL at 14:12

## 2021-12-28 RX ADMIN — INSULIN LISPRO 7 UNITS: 100 INJECTION, SOLUTION INTRAVENOUS; SUBCUTANEOUS at 17:12

## 2021-12-28 RX ADMIN — INSULIN LISPRO 3 UNITS: 100 INJECTION, SOLUTION INTRAVENOUS; SUBCUTANEOUS at 17:12

## 2021-12-28 RX ADMIN — INSULIN LISPRO 3 UNITS: 100 INJECTION, SOLUTION INTRAVENOUS; SUBCUTANEOUS at 14:21

## 2021-12-28 RX ADMIN — SODIUM CHLORIDE, PRESERVATIVE FREE 10 ML: 5 INJECTION INTRAVENOUS at 20:38

## 2021-12-28 RX ADMIN — HEPARIN SODIUM 4000 UNITS: 1000 INJECTION INTRAVENOUS; SUBCUTANEOUS at 11:44

## 2021-12-28 RX ADMIN — AMLODIPINE BESYLATE 5 MG: 5 TABLET ORAL at 14:13

## 2021-12-28 RX ADMIN — SERTRALINE HYDROCHLORIDE 50 MG: 50 TABLET, FILM COATED ORAL at 14:12

## 2021-12-28 RX ADMIN — PANTOPRAZOLE SODIUM 40 MG: 40 TABLET, DELAYED RELEASE ORAL at 14:12

## 2021-12-28 RX ADMIN — ASPIRIN 81 MG: 81 TABLET, CHEWABLE ORAL at 14:12

## 2021-12-28 RX ADMIN — ACETAMINOPHEN 650 MG: 325 TABLET, FILM COATED ORAL at 16:19

## 2021-12-28 RX ADMIN — OLANZAPINE 5 MG: 5 TABLET ORAL at 20:37

## 2021-12-29 VITALS
BODY MASS INDEX: 29.11 KG/M2 | WEIGHT: 158.2 LBS | TEMPERATURE: 98.5 F | SYSTOLIC BLOOD PRESSURE: 143 MMHG | DIASTOLIC BLOOD PRESSURE: 77 MMHG | RESPIRATION RATE: 18 BRPM | HEART RATE: 54 BPM | HEIGHT: 62 IN | OXYGEN SATURATION: 90 %

## 2021-12-29 LAB
BASOPHILS # BLD AUTO: 0.09 10*3/MM3 (ref 0–0.2)
BASOPHILS NFR BLD AUTO: 1 % (ref 0–1.5)
DEPRECATED RDW RBC AUTO: 46.6 FL (ref 37–54)
EOSINOPHIL # BLD AUTO: 0.17 10*3/MM3 (ref 0–0.4)
EOSINOPHIL NFR BLD AUTO: 1.9 % (ref 0.3–6.2)
ERYTHROCYTE [DISTWIDTH] IN BLOOD BY AUTOMATED COUNT: 14.7 % (ref 12.3–15.4)
GLUCOSE BLDC GLUCOMTR-MCNC: 151 MG/DL (ref 70–130)
GLUCOSE BLDC GLUCOMTR-MCNC: 319 MG/DL (ref 70–130)
GLUCOSE BLDC GLUCOMTR-MCNC: 70 MG/DL (ref 70–130)
GLUCOSE BLDC GLUCOMTR-MCNC: 94 MG/DL (ref 70–130)
HCT VFR BLD AUTO: 25.5 % (ref 34–46.6)
HGB BLD-MCNC: 8 G/DL (ref 12–15.9)
IMM GRANULOCYTES # BLD AUTO: 0.29 10*3/MM3 (ref 0–0.05)
IMM GRANULOCYTES NFR BLD AUTO: 3.2 % (ref 0–0.5)
LYMPHOCYTES # BLD AUTO: 1.53 10*3/MM3 (ref 0.7–3.1)
LYMPHOCYTES NFR BLD AUTO: 16.7 % (ref 19.6–45.3)
MCH RBC QN AUTO: 27.3 PG (ref 26.6–33)
MCHC RBC AUTO-ENTMCNC: 31.4 G/DL (ref 31.5–35.7)
MCV RBC AUTO: 87 FL (ref 79–97)
MONOCYTES # BLD AUTO: 1.02 10*3/MM3 (ref 0.1–0.9)
MONOCYTES NFR BLD AUTO: 11.1 % (ref 5–12)
NEUTROPHILS NFR BLD AUTO: 6.05 10*3/MM3 (ref 1.7–7)
NEUTROPHILS NFR BLD AUTO: 66.1 % (ref 42.7–76)
NRBC BLD AUTO-RTO: 0 /100 WBC (ref 0–0.2)
PLATELET # BLD AUTO: 340 10*3/MM3 (ref 140–450)
PMV BLD AUTO: 10.9 FL (ref 6–12)
RBC # BLD AUTO: 2.93 10*6/MM3 (ref 3.77–5.28)
SARS-COV-2 RNA RESP QL NAA+PROBE: NOT DETECTED
WBC NRBC COR # BLD: 9.15 10*3/MM3 (ref 3.4–10.8)

## 2021-12-29 PROCEDURE — 82962 GLUCOSE BLOOD TEST: CPT

## 2021-12-29 PROCEDURE — 63710000001 INSULIN LISPRO (HUMAN) PER 5 UNITS: Performed by: INTERNAL MEDICINE

## 2021-12-29 PROCEDURE — 85025 COMPLETE CBC W/AUTO DIFF WBC: CPT | Performed by: INTERNAL MEDICINE

## 2021-12-29 PROCEDURE — U0003 INFECTIOUS AGENT DETECTION BY NUCLEIC ACID (DNA OR RNA); SEVERE ACUTE RESPIRATORY SYNDROME CORONAVIRUS 2 (SARS-COV-2) (CORONAVIRUS DISEASE [COVID-19]), AMPLIFIED PROBE TECHNIQUE, MAKING USE OF HIGH THROUGHPUT TECHNOLOGIES AS DESCRIBED BY CMS-2020-01-R: HCPCS | Performed by: INTERNAL MEDICINE

## 2021-12-29 RX ORDER — ACETAMINOPHEN 325 MG/1
650 TABLET ORAL EVERY 4 HOURS PRN
Start: 2021-12-29 | End: 2022-01-09

## 2021-12-29 RX ORDER — AMLODIPINE BESYLATE 5 MG/1
5 TABLET ORAL
Qty: 30 TABLET | Refills: 0
Start: 2021-12-29 | End: 2022-01-09

## 2021-12-29 RX ADMIN — ASPIRIN 81 MG: 81 TABLET, CHEWABLE ORAL at 08:23

## 2021-12-29 RX ADMIN — TAMSULOSIN HYDROCHLORIDE 0.4 MG: 0.4 CAPSULE ORAL at 08:23

## 2021-12-29 RX ADMIN — MULTIPLE VITAMINS W/ MINERALS TAB 1 TABLET: TAB at 08:23

## 2021-12-29 RX ADMIN — AMLODIPINE BESYLATE 5 MG: 5 TABLET ORAL at 08:23

## 2021-12-29 RX ADMIN — INSULIN LISPRO 3 UNITS: 100 INJECTION, SOLUTION INTRAVENOUS; SUBCUTANEOUS at 08:32

## 2021-12-29 RX ADMIN — METOPROLOL TARTRATE 25 MG: 25 TABLET, FILM COATED ORAL at 08:26

## 2021-12-29 RX ADMIN — PANTOPRAZOLE SODIUM 40 MG: 40 TABLET, DELAYED RELEASE ORAL at 08:25

## 2021-12-29 RX ADMIN — LEVOTHYROXINE SODIUM 125 MCG: 0.12 TABLET ORAL at 08:23

## 2021-12-29 RX ADMIN — INSULIN GLARGINE-YFGN 15 UNITS: 100 INJECTION, SOLUTION SUBCUTANEOUS at 06:30

## 2021-12-29 RX ADMIN — OLANZAPINE 5 MG: 5 TABLET ORAL at 08:23

## 2021-12-29 RX ADMIN — ACETAMINOPHEN 650 MG: 325 TABLET, FILM COATED ORAL at 11:09

## 2021-12-29 RX ADMIN — INSULIN LISPRO 7 UNITS: 100 INJECTION, SOLUTION INTRAVENOUS; SUBCUTANEOUS at 08:22

## 2021-12-29 RX ADMIN — SERTRALINE HYDROCHLORIDE 50 MG: 50 TABLET, FILM COATED ORAL at 08:23

## 2021-12-29 RX ADMIN — FOLIC ACID 1 MG: 1 TABLET ORAL at 08:23

## 2022-01-09 ENCOUNTER — APPOINTMENT (OUTPATIENT)
Dept: CT IMAGING | Facility: HOSPITAL | Age: 57
End: 2022-01-09

## 2022-01-09 ENCOUNTER — APPOINTMENT (OUTPATIENT)
Dept: ULTRASOUND IMAGING | Facility: HOSPITAL | Age: 57
End: 2022-01-09

## 2022-01-09 ENCOUNTER — HOSPITAL ENCOUNTER (INPATIENT)
Facility: HOSPITAL | Age: 57
LOS: 6 days | Discharge: HOME | End: 2022-01-15
Attending: EMERGENCY MEDICINE | Admitting: SURGERY

## 2022-01-09 DIAGNOSIS — E80.6 HYPERBILIRUBINEMIA: ICD-10-CM

## 2022-01-09 DIAGNOSIS — J90 PLEURAL EFFUSION ON RIGHT: ICD-10-CM

## 2022-01-09 DIAGNOSIS — N12 PYELONEPHRITIS: Primary | ICD-10-CM

## 2022-01-09 DIAGNOSIS — R60.1 ANASARCA: ICD-10-CM

## 2022-01-09 DIAGNOSIS — N18.6 ESRD (END STAGE RENAL DISEASE): ICD-10-CM

## 2022-01-09 DIAGNOSIS — R18.8 OTHER ASCITES: ICD-10-CM

## 2022-01-09 DIAGNOSIS — K80.12 CALCULUS OF GALLBLADDER WITH ACUTE ON CHRONIC CHOLECYSTITIS WITHOUT OBSTRUCTION: ICD-10-CM

## 2022-01-09 PROBLEM — K80.20 CALCULUS OF GALLBLADDER WITHOUT BILIARY OBSTRUCTION: Status: ACTIVE | Noted: 2022-01-09

## 2022-01-09 LAB
ALBUMIN SERPL-MCNC: 2.9 G/DL (ref 3.5–5.2)
ALBUMIN/GLOB SERPL: 0.8 G/DL
ALP SERPL-CCNC: 1021 U/L (ref 39–117)
ALT SERPL W P-5'-P-CCNC: 46 U/L (ref 1–33)
ANION GAP SERPL CALCULATED.3IONS-SCNC: 16.3 MMOL/L (ref 5–15)
AST SERPL-CCNC: 77 U/L (ref 1–32)
BACTERIA UR QL AUTO: ABNORMAL /HPF
BASOPHILS # BLD AUTO: 0.07 10*3/MM3 (ref 0–0.2)
BASOPHILS NFR BLD AUTO: 0.6 % (ref 0–1.5)
BILIRUB SERPL-MCNC: 5.6 MG/DL (ref 0–1.2)
BILIRUB UR QL STRIP: ABNORMAL
BUN SERPL-MCNC: 45 MG/DL (ref 6–20)
BUN/CREAT SERPL: 12.6 (ref 7–25)
CALCIUM SPEC-SCNC: 8.2 MG/DL (ref 8.6–10.5)
CHLORIDE SERPL-SCNC: 96 MMOL/L (ref 98–107)
CLARITY UR: ABNORMAL
CO2 SERPL-SCNC: 22.7 MMOL/L (ref 22–29)
COLOR UR: ABNORMAL
CREAT SERPL-MCNC: 3.57 MG/DL (ref 0.57–1)
DEPRECATED RDW RBC AUTO: 56.7 FL (ref 37–54)
EOSINOPHIL # BLD AUTO: 0.28 10*3/MM3 (ref 0–0.4)
EOSINOPHIL NFR BLD AUTO: 2.5 % (ref 0.3–6.2)
ERYTHROCYTE [DISTWIDTH] IN BLOOD BY AUTOMATED COUNT: 18.2 % (ref 12.3–15.4)
GFR SERPL CREATININE-BSD FRML MDRD: 13 ML/MIN/1.73
GFR SERPL CREATININE-BSD FRML MDRD: ABNORMAL ML/MIN/{1.73_M2}
GLOBULIN UR ELPH-MCNC: 3.6 GM/DL
GLUCOSE BLDC GLUCOMTR-MCNC: 186 MG/DL (ref 70–130)
GLUCOSE BLDC GLUCOMTR-MCNC: 233 MG/DL (ref 70–130)
GLUCOSE SERPL-MCNC: 217 MG/DL (ref 65–99)
GLUCOSE UR STRIP-MCNC: ABNORMAL MG/DL
GRAN CASTS URNS QL MICRO: ABNORMAL /LPF
HCT VFR BLD AUTO: 28.3 % (ref 34–46.6)
HGB BLD-MCNC: 9.2 G/DL (ref 12–15.9)
HGB UR QL STRIP.AUTO: ABNORMAL
HYALINE CASTS UR QL AUTO: ABNORMAL /LPF
IMM GRANULOCYTES # BLD AUTO: 0.07 10*3/MM3 (ref 0–0.05)
IMM GRANULOCYTES NFR BLD AUTO: 0.6 % (ref 0–0.5)
KETONES UR QL STRIP: NEGATIVE
LEUKOCYTE ESTERASE UR QL STRIP.AUTO: ABNORMAL
LYMPHOCYTES # BLD AUTO: 1.37 10*3/MM3 (ref 0.7–3.1)
LYMPHOCYTES NFR BLD AUTO: 12 % (ref 19.6–45.3)
MCH RBC QN AUTO: 28 PG (ref 26.6–33)
MCHC RBC AUTO-ENTMCNC: 32.5 G/DL (ref 31.5–35.7)
MCV RBC AUTO: 86.3 FL (ref 79–97)
MONOCYTES # BLD AUTO: 0.9 10*3/MM3 (ref 0.1–0.9)
MONOCYTES NFR BLD AUTO: 7.9 % (ref 5–12)
NEUTROPHILS NFR BLD AUTO: 76.4 % (ref 42.7–76)
NEUTROPHILS NFR BLD AUTO: 8.7 10*3/MM3 (ref 1.7–7)
NITRITE UR QL STRIP: NEGATIVE
NRBC BLD AUTO-RTO: 0 /100 WBC (ref 0–0.2)
PH UR STRIP.AUTO: 6 [PH] (ref 5–8)
PLATELET # BLD AUTO: 244 10*3/MM3 (ref 140–450)
PMV BLD AUTO: 10.8 FL (ref 6–12)
POTASSIUM SERPL-SCNC: 4.6 MMOL/L (ref 3.5–5.2)
PROT SERPL-MCNC: 6.5 G/DL (ref 6–8.5)
PROT UR QL STRIP: ABNORMAL
RBC # BLD AUTO: 3.28 10*6/MM3 (ref 3.77–5.28)
RBC # UR STRIP: ABNORMAL /HPF
REF LAB TEST METHOD: ABNORMAL
SARS-COV-2 RNA PNL SPEC NAA+PROBE: NOT DETECTED
SODIUM SERPL-SCNC: 135 MMOL/L (ref 136–145)
SP GR UR STRIP: 1.02 (ref 1–1.03)
SQUAMOUS #/AREA URNS HPF: ABNORMAL /HPF
UROBILINOGEN UR QL STRIP: ABNORMAL
WBC # UR STRIP: ABNORMAL /HPF
WBC NRBC COR # BLD: 11.39 10*3/MM3 (ref 3.4–10.8)

## 2022-01-09 PROCEDURE — 87186 SC STD MICRODIL/AGAR DIL: CPT | Performed by: NURSE PRACTITIONER

## 2022-01-09 PROCEDURE — 74176 CT ABD & PELVIS W/O CONTRAST: CPT

## 2022-01-09 PROCEDURE — 99284 EMERGENCY DEPT VISIT MOD MDM: CPT

## 2022-01-09 PROCEDURE — 80053 COMPREHEN METABOLIC PANEL: CPT | Performed by: NURSE PRACTITIONER

## 2022-01-09 PROCEDURE — 76705 ECHO EXAM OF ABDOMEN: CPT

## 2022-01-09 PROCEDURE — 87077 CULTURE AEROBIC IDENTIFY: CPT | Performed by: NURSE PRACTITIONER

## 2022-01-09 PROCEDURE — 25010000002 CEFTRIAXONE PER 250 MG: Performed by: NURSE PRACTITIONER

## 2022-01-09 PROCEDURE — 82962 GLUCOSE BLOOD TEST: CPT

## 2022-01-09 PROCEDURE — 99222 1ST HOSP IP/OBS MODERATE 55: CPT | Performed by: INTERNAL MEDICINE

## 2022-01-09 PROCEDURE — 25010000002 HYDROMORPHONE PER 4 MG: Performed by: NURSE PRACTITIONER

## 2022-01-09 PROCEDURE — 25010000002 ONDANSETRON PER 1 MG: Performed by: NURSE PRACTITIONER

## 2022-01-09 PROCEDURE — 87635 SARS-COV-2 COVID-19 AMP PRB: CPT | Performed by: NURSE PRACTITIONER

## 2022-01-09 PROCEDURE — 87086 URINE CULTURE/COLONY COUNT: CPT | Performed by: NURSE PRACTITIONER

## 2022-01-09 PROCEDURE — 85025 COMPLETE CBC W/AUTO DIFF WBC: CPT | Performed by: NURSE PRACTITIONER

## 2022-01-09 PROCEDURE — 25010000002 PIPERACILLIN SOD-TAZOBACTAM PER 1 G: Performed by: HOSPITALIST

## 2022-01-09 PROCEDURE — 81001 URINALYSIS AUTO W/SCOPE: CPT | Performed by: NURSE PRACTITIONER

## 2022-01-09 RX ORDER — PANTOPRAZOLE SODIUM 40 MG/1
40 TABLET, DELAYED RELEASE ORAL DAILY
Status: DISCONTINUED | OUTPATIENT
Start: 2022-01-09 | End: 2022-01-15 | Stop reason: HOSPADM

## 2022-01-09 RX ORDER — MULTIPLE VITAMINS W/ MINERALS TAB 9MG-400MCG
1 TAB ORAL DAILY
COMMUNITY
End: 2022-07-22 | Stop reason: HOSPADM

## 2022-01-09 RX ORDER — HYDROMORPHONE HYDROCHLORIDE 1 MG/ML
0.5 INJECTION, SOLUTION INTRAMUSCULAR; INTRAVENOUS; SUBCUTANEOUS ONCE
Status: COMPLETED | OUTPATIENT
Start: 2022-01-09 | End: 2022-01-09

## 2022-01-09 RX ORDER — FUROSEMIDE 40 MG/1
40 TABLET ORAL
Status: DISCONTINUED | OUTPATIENT
Start: 2022-01-09 | End: 2022-01-15 | Stop reason: HOSPADM

## 2022-01-09 RX ORDER — ONDANSETRON 4 MG/1
4 TABLET, FILM COATED ORAL EVERY 6 HOURS PRN
Status: DISCONTINUED | OUTPATIENT
Start: 2022-01-09 | End: 2022-01-15 | Stop reason: HOSPADM

## 2022-01-09 RX ORDER — MORPHINE SULFATE 2 MG/ML
2 INJECTION, SOLUTION INTRAMUSCULAR; INTRAVENOUS EVERY 4 HOURS PRN
Status: DISCONTINUED | OUTPATIENT
Start: 2022-01-09 | End: 2022-01-11 | Stop reason: SDUPTHER

## 2022-01-09 RX ORDER — AMLODIPINE BESYLATE 5 MG/1
5 TABLET ORAL DAILY
Status: DISCONTINUED | OUTPATIENT
Start: 2022-01-09 | End: 2022-01-15 | Stop reason: HOSPADM

## 2022-01-09 RX ORDER — UREA 10 %
3 LOTION (ML) TOPICAL NIGHTLY PRN
Status: DISCONTINUED | OUTPATIENT
Start: 2022-01-09 | End: 2022-01-13

## 2022-01-09 RX ORDER — TAMSULOSIN HYDROCHLORIDE 0.4 MG/1
1 CAPSULE ORAL DAILY
COMMUNITY
End: 2022-09-13 | Stop reason: HOSPADM

## 2022-01-09 RX ORDER — FOLIC ACID 1 MG/1
1 TABLET ORAL DAILY
COMMUNITY

## 2022-01-09 RX ORDER — AMLODIPINE BESYLATE 5 MG/1
10 TABLET ORAL DAILY
COMMUNITY
End: 2022-07-22 | Stop reason: HOSPADM

## 2022-01-09 RX ORDER — HYDROCODONE BITARTRATE AND ACETAMINOPHEN 5; 325 MG/1; MG/1
1 TABLET ORAL EVERY 6 HOURS PRN
Status: DISCONTINUED | OUTPATIENT
Start: 2022-01-09 | End: 2022-01-10

## 2022-01-09 RX ORDER — MULTIPLE VITAMINS W/ MINERALS TAB 9MG-400MCG
1 TAB ORAL DAILY
Status: DISCONTINUED | OUTPATIENT
Start: 2022-01-09 | End: 2022-01-15 | Stop reason: HOSPADM

## 2022-01-09 RX ORDER — ALBUMIN (HUMAN) 12.5 G/50ML
12.5 SOLUTION INTRAVENOUS AS NEEDED
Status: ACTIVE | OUTPATIENT
Start: 2022-01-09 | End: 2022-01-10

## 2022-01-09 RX ORDER — TAMSULOSIN HYDROCHLORIDE 0.4 MG/1
0.4 CAPSULE ORAL DAILY
Status: DISCONTINUED | OUTPATIENT
Start: 2022-01-09 | End: 2022-01-15 | Stop reason: HOSPADM

## 2022-01-09 RX ORDER — OLANZAPINE 5 MG/1
5 TABLET ORAL 2 TIMES DAILY
COMMUNITY
End: 2022-07-22 | Stop reason: HOSPADM

## 2022-01-09 RX ORDER — INSULIN LISPRO 100 [IU]/ML
3 INJECTION, SOLUTION INTRAVENOUS; SUBCUTANEOUS
Status: DISCONTINUED | OUTPATIENT
Start: 2022-01-09 | End: 2022-01-15 | Stop reason: HOSPADM

## 2022-01-09 RX ORDER — OLANZAPINE 5 MG/1
5 TABLET ORAL 2 TIMES DAILY
Status: DISCONTINUED | OUTPATIENT
Start: 2022-01-09 | End: 2022-01-15 | Stop reason: HOSPADM

## 2022-01-09 RX ORDER — ASPIRIN 81 MG/1
81 TABLET ORAL DAILY
Status: DISCONTINUED | OUTPATIENT
Start: 2022-01-09 | End: 2022-01-15 | Stop reason: HOSPADM

## 2022-01-09 RX ORDER — ONDANSETRON 2 MG/ML
4 INJECTION INTRAMUSCULAR; INTRAVENOUS ONCE
Status: COMPLETED | OUTPATIENT
Start: 2022-01-09 | End: 2022-01-09

## 2022-01-09 RX ORDER — SERTRALINE HYDROCHLORIDE 100 MG/1
100 TABLET, FILM COATED ORAL DAILY
COMMUNITY
End: 2022-07-22 | Stop reason: HOSPADM

## 2022-01-09 RX ORDER — ACETAMINOPHEN 325 MG/1
650 TABLET ORAL EVERY 4 HOURS PRN
COMMUNITY
End: 2022-07-22 | Stop reason: HOSPADM

## 2022-01-09 RX ORDER — LEVOTHYROXINE SODIUM 0.12 MG/1
125 TABLET ORAL DAILY
Status: DISCONTINUED | OUTPATIENT
Start: 2022-01-09 | End: 2022-01-15 | Stop reason: HOSPADM

## 2022-01-09 RX ORDER — NICOTINE POLACRILEX 4 MG
15 LOZENGE BUCCAL
Status: DISCONTINUED | OUTPATIENT
Start: 2022-01-09 | End: 2022-01-15 | Stop reason: HOSPADM

## 2022-01-09 RX ORDER — DEXTROSE MONOHYDRATE 25 G/50ML
25 INJECTION, SOLUTION INTRAVENOUS
Status: DISCONTINUED | OUTPATIENT
Start: 2022-01-09 | End: 2022-01-15 | Stop reason: HOSPADM

## 2022-01-09 RX ORDER — NITROGLYCERIN 0.4 MG/1
0.4 TABLET SUBLINGUAL
Status: DISCONTINUED | OUTPATIENT
Start: 2022-01-09 | End: 2022-01-15 | Stop reason: HOSPADM

## 2022-01-09 RX ORDER — ACETAMINOPHEN 325 MG/1
650 TABLET ORAL EVERY 4 HOURS PRN
Status: DISCONTINUED | OUTPATIENT
Start: 2022-01-09 | End: 2022-01-15 | Stop reason: HOSPADM

## 2022-01-09 RX ORDER — ONDANSETRON 2 MG/ML
4 INJECTION INTRAMUSCULAR; INTRAVENOUS EVERY 6 HOURS PRN
Status: DISCONTINUED | OUTPATIENT
Start: 2022-01-09 | End: 2022-01-15 | Stop reason: HOSPADM

## 2022-01-09 RX ORDER — ROPINIROLE 0.5 MG/1
0.5 TABLET, FILM COATED ORAL EVERY 12 HOURS SCHEDULED
Status: DISCONTINUED | OUTPATIENT
Start: 2022-01-09 | End: 2022-01-15 | Stop reason: HOSPADM

## 2022-01-09 RX ORDER — SODIUM CHLORIDE 0.9 % (FLUSH) 0.9 %
10 SYRINGE (ML) INJECTION AS NEEDED
Status: DISCONTINUED | OUTPATIENT
Start: 2022-01-09 | End: 2022-01-09

## 2022-01-09 RX ORDER — FOLIC ACID 1 MG/1
1 TABLET ORAL DAILY
Status: DISCONTINUED | OUTPATIENT
Start: 2022-01-09 | End: 2022-01-15 | Stop reason: HOSPADM

## 2022-01-09 RX ORDER — ROPINIROLE 0.5 MG/1
0.75 TABLET, FILM COATED ORAL NIGHTLY
Status: DISCONTINUED | OUTPATIENT
Start: 2022-01-09 | End: 2022-01-09

## 2022-01-09 RX ORDER — SERTRALINE HYDROCHLORIDE 100 MG/1
100 TABLET, FILM COATED ORAL DAILY
Status: DISCONTINUED | OUTPATIENT
Start: 2022-01-09 | End: 2022-01-15 | Stop reason: HOSPADM

## 2022-01-09 RX ORDER — INSULIN LISPRO 100 [IU]/ML
0-9 INJECTION, SOLUTION INTRAVENOUS; SUBCUTANEOUS
Status: DISCONTINUED | OUTPATIENT
Start: 2022-01-09 | End: 2022-01-15 | Stop reason: HOSPADM

## 2022-01-09 RX ORDER — LEVOTHYROXINE SODIUM 0.12 MG/1
125 TABLET ORAL DAILY
COMMUNITY
End: 2022-07-22 | Stop reason: HOSPADM

## 2022-01-09 RX ADMIN — ASPIRIN 81 MG: 81 TABLET, COATED ORAL at 17:23

## 2022-01-09 RX ADMIN — METOPROLOL TARTRATE 25 MG: 25 TABLET, FILM COATED ORAL at 20:15

## 2022-01-09 RX ADMIN — HYDROCODONE BITARTRATE AND ACETAMINOPHEN 1 TABLET: 5; 325 TABLET ORAL at 20:15

## 2022-01-09 RX ADMIN — HYDROMORPHONE HYDROCHLORIDE 0.5 MG: 1 INJECTION, SOLUTION INTRAMUSCULAR; INTRAVENOUS; SUBCUTANEOUS at 11:16

## 2022-01-09 RX ADMIN — HYDROMORPHONE HYDROCHLORIDE 0.5 MG: 1 INJECTION, SOLUTION INTRAMUSCULAR; INTRAVENOUS; SUBCUTANEOUS at 08:32

## 2022-01-09 RX ADMIN — TAZOBACTAM SODIUM AND PIPERACILLIN SODIUM 3.38 G: 375; 3 INJECTION, SOLUTION INTRAVENOUS at 20:14

## 2022-01-09 RX ADMIN — MULTIPLE VITAMINS W/ MINERALS TAB 1 TABLET: TAB at 17:23

## 2022-01-09 RX ADMIN — FUROSEMIDE 40 MG: 40 TABLET ORAL at 18:05

## 2022-01-09 RX ADMIN — HYDROMORPHONE HYDROCHLORIDE 0.5 MG: 1 INJECTION, SOLUTION INTRAMUSCULAR; INTRAVENOUS; SUBCUTANEOUS at 14:17

## 2022-01-09 RX ADMIN — ONDANSETRON 4 MG: 2 INJECTION INTRAMUSCULAR; INTRAVENOUS at 11:16

## 2022-01-09 RX ADMIN — TAMSULOSIN HYDROCHLORIDE 0.4 MG: 0.4 CAPSULE ORAL at 17:23

## 2022-01-09 RX ADMIN — PANTOPRAZOLE SODIUM 40 MG: 40 TABLET, DELAYED RELEASE ORAL at 17:23

## 2022-01-09 RX ADMIN — FOLIC ACID 1 MG: 1 TABLET ORAL at 17:23

## 2022-01-09 RX ADMIN — CEFTRIAXONE SODIUM 2 G: 2 INJECTION, POWDER, FOR SOLUTION INTRAMUSCULAR; INTRAVENOUS at 10:05

## 2022-01-09 RX ADMIN — ROPINIROLE HYDROCHLORIDE 0.5 MG: 0.5 TABLET, FILM COATED ORAL at 20:15

## 2022-01-09 RX ADMIN — Medication 3 MG: at 20:14

## 2022-01-09 RX ADMIN — SERTRALINE 100 MG: 100 TABLET, FILM COATED ORAL at 17:23

## 2022-01-09 RX ADMIN — ONDANSETRON 4 MG: 2 INJECTION INTRAMUSCULAR; INTRAVENOUS at 14:17

## 2022-01-09 RX ADMIN — ONDANSETRON 4 MG: 2 INJECTION INTRAMUSCULAR; INTRAVENOUS at 08:31

## 2022-01-09 RX ADMIN — AMLODIPINE BESYLATE 5 MG: 5 TABLET ORAL at 17:23

## 2022-01-09 RX ADMIN — OLANZAPINE 5 MG: 5 TABLET ORAL at 20:15

## 2022-01-09 NOTE — PROGRESS NOTES
Clinical Pharmacy Services: Medication History    Zaina Martinez is a 56 y.o. female presenting to [facility] for No admission diagnoses are documented for this encounter.    She  has a past medical history of Acute on chronic diastolic CHF (congestive heart failure) ([facility]), CAD (coronary artery disease) (12/20/2021), Diabetes ([facility]), Disease of thyroid gland, GERD (gastroesophageal reflux disease), Hypertension, and Rheumatoid arthritis ([facility]).    Allergies as of 01/09/2022   • (No Known Allergies)       Medication information was obtained from: Phone call to [place], Patient  Pharmacy and Phone Number: [id number] - [place], [place] - [address] [facility] [address] [phone] [facility] [phone]         Prior to Admission Medications     Prescriptions Last Dose Informant Patient Reported? Taking?    acetaminophen (TYLENOL) 325 MG tablet 1/9/2022 Nursing Home Yes Yes    Take 650 mg by mouth Every 4 (Four) Hours As Needed for Mild Pain .    amLODIPine (NORVASC) 5 MG tablet 1/8/2022 Nursing Home Yes Yes    Take 5 mg by mouth Daily.    aspirin (aspirin) 81 MG EC tablet 1/8/2022 Nursing Home Yes Yes    Take 81 mg by mouth Daily.    folic acid (FOLVITE) 1 MG tablet 1/8/2022 Nursing Home Yes Yes    Take 1 mg by mouth Daily.    insulin glargine (LANTUS, SEMGLEE) 100 UNIT/ML injection 1/9/2022 Nursing Home Yes Yes    Inject 10 Units under the skin into the appropriate area as directed Daily.    insulin lispro (humaLOG) 100 UNIT/ML injection 1/8/2022 Nursing Home Yes Yes    Inject 3 Units under the skin into the appropriate area as directed 3 (Three) Times a Day Before Meals.    levothyroxine (SYNTHROID, LEVOTHROID) 125 MCG tablet 1/9/2022 Nursing Home Yes Yes    Take 125 mcg by mouth Daily.    metoprolol tartrate (LOPRESSOR) 25 MG tablet 1/8/2022 Nursing Home Yes Yes    Take 25 mg by mouth 2 (Two) Times a Day.    multivitamin with minerals (MULTIVITAMIN ADULT PO)  1/8/2022 Nursing Home Yes Yes    Take 1 tablet by mouth Daily.    OLANZapine (zyPREXA) 5 MG tablet 1/8/2022 Nursing Home Yes Yes    Take 5 mg by mouth 2 (Two) Times a Day.    pantoprazole (PROTONIX) 40 MG EC tablet 1/8/2022 Nursing Home Yes Yes    Take 40 mg by mouth Daily.    rOPINIRole (REQUIP) 0.25 MG tablet 1/8/2022 Nursing Home Yes Yes    Take 0.75 mg by mouth every night at bedtime.    sertraline (ZOLOFT) 100 MG tablet 1/8/2022 Nursing Home Yes Yes    Take 100 mg by mouth Daily.    tamsulosin (FLOMAX) 0.4 MG capsule 24 hr capsule 1/8/2022 Nursing Home Yes Yes    Take 1 capsule by mouth Daily.    glucose blood (Accu-Chek Liz Plus) test strip   Yes No    4 (Four) Times a Day.    glucose blood (Accu-Chek Guide) test strip   Yes No    1 each by Other route 4 (Four) Times a Day.            Medication notes:     This medication list is complete to the best of my knowledge as of 1/9/2022    Please call if questions.    Abhi oDw [facility]  1/9/2022 13:51 EST

## 2022-01-09 NOTE — ED TRIAGE NOTES
Pt to triage from [place] via [email or web address] [id number] with c/o right flank pain. Pt history of kidney stones, denies dysuria.  Pt at NH for rehab.  Pt provided with mask in triage. Triage personnel wore appropriate PPE

## 2022-01-09 NOTE — PROGRESS NOTES
"Pharmacokinetic Consult - Zosyn Dosing    Zaina Martinez is a 56 y.o. female who is on day 1 pharmacy to dose Zosyn for UTI/Intra abdominal infection.  Pharmacy dosing Zosyn per Dr. Yo's request.   Other antimicrobials: none    HPI:    Relevant clinical data and objective history reviewed:  157.5 cm (62\")  77.6 kg (171 lb)  Body mass index is 31.28 kg/m².     She has a past medical history of Acute on chronic diastolic CHF (congestive heart failure) ([facility]), CAD (coronary artery disease) (12/20/2021), Diabetes ([facility]), Disease of thyroid gland, GERD (gastroesophageal reflux disease), Hypertension, and Rheumatoid arthritis ([facility]).    Allergies as of 01/09/2022    (No Known Allergies)     Vital Signs (last 24 hours)         01/08 0700  01/09 0659 01/09 0700  01/09 1822   Most Recent      Temp (°F)   97.5      97.7     97.7 (36.5) 01/09 1550    Heart Rate   60    48 -  52     48 01/09 1501    Resp   16    12 -  20     20 01/09 1550    BP   130/90    146/84 -  218/100     216/96  Comment: nurse notified 01/09 1609    SpO2 (%)   99    93 -  100     100 01/09 1501          Estimated Creatinine Clearance: 17 mL/min (A) (by C-G formula based on SCr of 3.57 mg/dL (H)).  Results from last 7 days   Lab Units 01/09/22  0946   CREATININE mg/dL 3.57*     Results from last 7 days   Lab Units 01/09/22  0833   WBC 10*3/mm3 11.39*     Baseline culture/source/susceptibility:        Imaging:      Labs:      Anti-Infectives (From admission, onward)      Ordered     Dose/Rate Route Frequency Start Stop    01/09/22 1821  piperacillin-tazobactam (ZOSYN) 3.375 g in iso-osmotic dextrose 50 ml (premix)        Ordering Provider: Fran Yo MD    3.375 g  over 4 Hours Intravenous Every 12 Hours 01/10/22 0400 01/16/22 1813    01/09/22 1821  piperacillin-tazobactam (ZOSYN) 3.375 g in iso-osmotic dextrose 50 ml (premix)        Ordering Provider: Fran Yo MD    3.375 g  over 30 Minutes Intravenous Once 01/09/22 1915 01/09/22 1814  " Pharmacy to Dose Zosyn        Ordering Provider: Fran Yo MD     Does not apply Continuous PRN 01/09/22 1814 01/16/22 1813    01/09/22 0846  cefTRIAXone (ROCEPHIN) 2 g in sodium chloride 0.9 % 100 mL IVPB-VTB        Ordering Provider: Ayesha Salgado APRN    2 g  200 mL/hr over 30 Minutes Intravenous Once 01/09/22 0848 01/09/22 1158             Assessment/Plan  Zosyn 3.375 g iv every 12 hours as per [facility] dosing guidelines as patient is ESRD dialysis [place], Sat at this time    Pharmacy will continue to follow daily while on Zosyn and adjust as needed.     Thanks, Santo Nelson, PharmD, BCIDP, BCPS

## 2022-01-09 NOTE — H&P
"    Patient Name:  Zaina Martinez  YOB: 1965  MRN:  [id number]  Admit Date:  1/9/2022  Patient Care Team:  Karson Oreilly MD as PCP - General (Family Medicine)      Subjective   History Present Illness     Chief Complaint   Patient presents with   • Flank Pain       Ms. Martinez is a 56 y.o. female with a history of MAYUR that led her to require HD presumably now with ESRD that presents to [facility] complaining of right-sided abdominal/flank pain.  She has been at a rehab facility since recent discharge from hospital last month.  She is scheduled to receive dialysis every Tuesday, Thursday and Saturday.  Patient reports she did not complete her normal dialysis session yesterday and on Thursday only underwent 1 hour.  She has no prior knowledge of gallstones.  She does report occasionally gets some abdominal pain with meals but not necessarily of late.  She has a history of kidney stones.  Denies hematuria.  She has noted her urine slightly darker than usual.  She has had significant weight gain over the last several months her  describes as \"fluid.\"    History of Present Illness  Review of Systems   Constitutional: Positive for appetite change and unexpected weight change. Negative for fever.   HENT: Negative.    Respiratory: Positive for shortness of breath.    Cardiovascular: Negative.    Gastrointestinal: Positive for abdominal pain, diarrhea and nausea. Negative for vomiting.   Endocrine: Negative.    Genitourinary: Negative.    Musculoskeletal: Negative.    Skin: Negative.    Neurological: Positive for weakness.   Hematological: Negative.    Psychiatric/Behavioral: Negative.         Personal History     Past Medical History:   Diagnosis Date   • Acute on chronic diastolic CHF (congestive heart failure) ([facility])    • CAD (coronary artery disease) 12/20/2021   • Diabetes (HCC)    • Disease of thyroid gland    • GERD (gastroesophageal reflux disease)    • Hypertension    • Rheumatoid " arthritis (HCC)      Past Surgical History:   Procedure Laterality Date   • EYE SURGERY     • HYSTERECTOMY     • INSERTION HEMODIALYSIS CATHETER N/A 12/6/2021    Procedure: HEMODIALYSIS CATHETER INSERTION;  Surgeon: Keli Salazar MD;  Location: [facility] 18/19;  Service: Vascular;  Laterality: N/A;     History reviewed. No pertinent family history.  Social History     Tobacco Use   • Smoking status: Never Smoker   • Smokeless tobacco: Never Used   Vaping Use   • Vaping Use: Never used   Substance Use Topics   • Alcohol use: Never   • Drug use: Never     No current facility-administered medications on file prior to encounter.     Current Outpatient Medications on File Prior to Encounter   Medication Sig Dispense Refill   • acetaminophen (TYLENOL) 325 MG tablet Take 650 mg by mouth Every 4 (Four) Hours As Needed for Mild Pain .     • amLODIPine (NORVASC) 5 MG tablet Take 5 mg by mouth Daily.     • aspirin (aspirin) 81 MG EC tablet Take 81 mg by mouth Daily.     • folic acid (FOLVITE) 1 MG tablet Take 1 mg by mouth Daily.     • insulin glargine (LANTUS, SEMGLEE) 100 UNIT/ML injection Inject 10 Units under the skin into the appropriate area as directed Daily.     • insulin lispro (humaLOG) 100 UNIT/ML injection Inject 3 Units under the skin into the appropriate area as directed 3 (Three) Times a Day Before Meals.     • levothyroxine (SYNTHROID, LEVOTHROID) 125 MCG tablet Take 125 mcg by mouth Daily.     • metoprolol tartrate (LOPRESSOR) 25 MG tablet Take 25 mg by mouth 2 (Two) Times a Day.     • multivitamin with minerals (MULTIVITAMIN ADULT PO) Take 1 tablet by mouth Daily.     • OLANZapine (zyPREXA) 5 MG tablet Take 5 mg by mouth 2 (Two) Times a Day.     • pantoprazole (PROTONIX) 40 MG EC tablet Take 40 mg by mouth Daily.     • rOPINIRole (REQUIP) 0.25 MG tablet Take 0.75 mg by mouth every night at bedtime.     • sertraline (ZOLOFT) 100 MG tablet Take 100 mg by mouth Daily.     • tamsulosin (FLOMAX) 0.4  MG capsule 24 hr capsule Take 1 capsule by mouth Daily.     • glucose blood (Accu-Chek Liz Plus) test strip 4 (Four) Times a Day.     • glucose blood (Accu-Chek Guide) test strip 1 each by Other route 4 (Four) Times a Day.     • [DISCONTINUED] acetaminophen (TYLENOL) 325 MG tablet Take 2 tablets by mouth Every 4 (Four) Hours As Needed for Mild Pain .     • [DISCONTINUED] amLODIPine (NORVASC) 5 MG tablet Take 1 tablet by mouth Daily for 30 days. 30 tablet 0   • [DISCONTINUED] folic acid (FOLVITE) 1 MG tablet Take 1 tablet by mouth Daily for 30 days. 30 tablet 0   • [DISCONTINUED] insulin glargine (LANTUS, SEMGLEE) 100 UNIT/ML injection Inject 10 Units under the skin into the appropriate area as directed Every Morning for 30 days. 3 mL 0   • [DISCONTINUED] insulin lispro (humaLOG) 100 UNIT/ML injection Inject 3 Units under the skin into the appropriate area as directed 3 (Three) Times a Day Before Meals for 30 days. 2.7 mL 0   • [DISCONTINUED] levothyroxine (SYNTHROID, LEVOTHROID) 125 MCG tablet Take 1 tablet by mouth Daily for 30 days. 30 tablet 0   • [DISCONTINUED] metoprolol tartrate (LOPRESSOR) 25 MG tablet Take 1 tablet by mouth Every 12 (Twelve) Hours for 30 days. 60 tablet 0   • [DISCONTINUED] multivitamin with minerals tablet tablet Take 1 tablet by mouth Daily for 30 days. 30 tablet 0   • [DISCONTINUED] OLANZapine (zyPREXA) 5 MG tablet Take 1 tablet by mouth 2 (Two) Times a Day for 30 days. 60 tablet 0   • [DISCONTINUED] sertraline (ZOLOFT) 100 MG tablet Take 0.5 tablets by mouth Daily for 30 days. 15 tablet 0   • [DISCONTINUED] tamsulosin (FLOMAX) 0.4 MG capsule 24 hr capsule Take 1 capsule by mouth Daily for 30 days. 30 capsule 0     No Known Allergies    Objective    Objective     Vital Signs  Temp:  [97.5 °F (36.4 °C)-97.7 °F (36.5 °C)] 97.7 °F (36.5 °C)  Heart Rate:  [48-60] 48  Resp:  [12-20] 20  BP: (130-218)/() 216/96  SpO2:  [93 %-100 %] 100 %  on  Flow (L/min):  [1-2] 1;   Device (Oxygen  Therapy): nasal cannula  Body mass index is 32.01 kg/m².    Physical Exam  Vitals and nursing note reviewed.   Constitutional:       Appearance: She is obese. She is ill-appearing and toxic-appearing.   HENT:      Head: Normocephalic and atraumatic.   Eyes:      General: No scleral icterus.     Conjunctiva/sclera: Conjunctivae normal.   Neck:      Vascular: No JVD.   Cardiovascular:      Rate and Rhythm: Normal rate and regular rhythm.      Heart sounds: Normal heart sounds.   Pulmonary:      Effort: Pulmonary effort is normal.      Breath sounds: Normal breath sounds.   Abdominal:      General: Bowel sounds are normal. There is no distension.      Palpations: Abdomen is soft.      Tenderness: There is abdominal tenderness in the right upper quadrant. There is guarding. There is no rebound.   Musculoskeletal:         General: Normal range of motion.      Cervical back: Normal range of motion.      Right lower leg: Edema present.      Left lower leg: Edema present.   Skin:     General: Skin is warm and dry.      Coloration: Skin is pale.   Neurological:      Mental Status: She is oriented to person, place, and time.   Psychiatric:         Behavior: Behavior normal.         Cognition and Memory: Cognition is impaired.         Results Review:  I reviewed the patient's new clinical results.  I reviewed the patient's new imaging results and agree with the interpretation.  I reviewed the patient's other test results and agree with the interpretation  I personally viewed and interpreted the patient's EKG/Telemetry data  Discussed with ED provider.    Lab Results (last 24 hours)     Procedure Component Value Units Date/Time    Urinalysis With Microscopic If Indicated (No Culture) - Urine, Clean Catch [[id number]]  (Abnormal) Collected: 01/09/22 0714    Specimen: Urine, Clean Catch Updated: 01/09/22 0729     Color, UA Dark Yellow     Appearance, UA Cloudy     pH, UA 6.0     Specific Gravity, UA 1.024     Glucose,  mg/dL  (2+)     Ketones, UA Negative     Bilirubin, UA Moderate (2+)     Blood, UA Small (1+)     Protein, UA >=300 mg/dL (3+)     Leuk Esterase, UA Small (1+)     Nitrite, UA Negative     Urobilinogen, UA 2.0 E.U./dL    Urinalysis, Microscopic Only - Urine, Clean Catch [[id number]]  (Abnormal) Collected: 01/09/22 0714    Specimen: Urine, Clean Catch Updated: 01/09/22 0738     RBC, UA 0-2 /HPF      WBC, UA Too Numerous to Count /HPF      Bacteria, UA 2+ /HPF      Squamous Epithelial Cells, UA 3-6 /HPF      Hyaline Casts, UA None Seen /LPF      Granular Casts, UA 0-2 /LPF      Methodology Manual Light Microscopy    Urine Culture - Urine, Urine, Clean Catch [[id number]] Collected: 01/09/22 0714    Specimen: Urine, Clean Catch Updated: 01/09/22 1440    CBC & Differential [[id number]]  (Abnormal) Collected: 01/09/22 0833    Specimen: Blood Updated: 01/09/22 0903    Narrative:      The following orders were created for panel order CBC & Differential.  Procedure                               Abnormality         Status                     ---------                               -----------         ------                     CBC Auto Differential[[id number]]        Abnormal            Final result                 Please view results for these tests on the individual orders.    CBC Auto Differential [[id number]]  (Abnormal) Collected: 01/09/22 0833    Specimen: Blood Updated: 01/09/22 0903     WBC 11.39 10*3/mm3      RBC 3.28 10*6/mm3      Hemoglobin 9.2 g/dL      Hematocrit 28.3 %      MCV 86.3 fL      MCH 28.0 pg      MCHC 32.5 g/dL      RDW 18.2 %      RDW-SD 56.7 fl      MPV 10.8 fL      Platelets 244 10*3/mm3      Neutrophil % 76.4 %      Lymphocyte % 12.0 %      Monocyte % 7.9 %      Eosinophil % 2.5 %      Basophil % 0.6 %      Immature Grans % 0.6 %      Neutrophils, Absolute 8.70 10*3/mm3      Lymphocytes, Absolute 1.37 10*3/mm3      Monocytes, Absolute 0.90 10*3/mm3      Eosinophils, Absolute 0.28 10*3/mm3      Basophils,  Absolute 0.07 10*3/mm3      Immature Grans, Absolute 0.07 10*3/mm3      nRBC 0.0 /100 WBC     Comprehensive Metabolic Panel [[id number]]  (Abnormal) Collected: 01/09/22 0946    Specimen: Blood Updated: 01/09/22 1020     Glucose 217 mg/dL      BUN 45 mg/dL      Creatinine 3.57 mg/dL      Sodium 135 mmol/L      Potassium 4.6 mmol/L      Chloride 96 mmol/L      CO2 22.7 mmol/L      Calcium 8.2 mg/dL      Total Protein 6.5 g/dL      Albumin 2.90 g/dL      ALT (SGPT) 46 U/L      AST (SGOT) 77 U/L      Alkaline Phosphatase 1,021 U/L      Total Bilirubin 5.6 mg/dL      eGFR Non African Amer 13 mL/min/1.73      Comment: <15 Indicative of kidney failure.        eGFR   Amer --     Comment: <15 Indicative of kidney failure.        Globulin 3.6 gm/dL      A/G Ratio 0.8 g/dL      BUN/Creatinine Ratio 12.6     Anion Gap 16.3 mmol/L     Narrative:      GFR Normal >60  Chronic Kidney Disease <60  Kidney Failure <15      COVID PRE-OP / PRE-PROCEDURE SCREENING ORDER (NO ISOLATION) - Swab, Nasopharynx [[id number]]  (Normal) Collected: 01/09/22 1426    Specimen: Swab from Nasopharynx Updated: 01/09/22 1519    Narrative:      The following orders were created for panel order COVID PRE-OP / PRE-PROCEDURE SCREENING ORDER (NO ISOLATION) - Swab, Nasopharynx.  Procedure                               Abnormality         Status                     ---------                               -----------         ------                     COVID-19,[facility] IN-HOUSE...[[id number]]  Normal              Final result                 Please view results for these tests on the individual orders.    COVID-19,[facility] IN-HOUSE CEPHEID/MARTY NP SWAB IN TRANSPORT MEDIA 8-12 HR TAT - Swab, Nasopharynx [[id number]]  (Normal) Collected: 01/09/22 1426    Specimen: Swab from Nasopharynx Updated: 01/09/22 1519     COVID19 Not Detected    Narrative:      Fact sheet for providers: https://www.fda.gov/media/803326/download    Fact sheet for patients:  https://www.fda.gov/media/742016/download    Test performed by PCR.    POC Glucose Once [[id number]]  (Abnormal) Collected: 01/09/22 1625    Specimen: Blood Updated: 01/09/22 1626     Glucose 186 mg/dL      Comment: RN Notified R and V Meter: [id number] : [id number] Sree [id number]             Imaging Results (Last 24 Hours)     Procedure Component Value Units Date/Time    CT Abdomen Pelvis Without Contrast [[id number]] Collected: 01/09/22 0841     Updated: 01/09/22 1030    Narrative:      CT SCAN OF THE ABDOMEN AND PELVIS WITHOUT CONTRAST     HISTORY: Right flank pain.     The CT scan was performed as an emergency procedure through the abdomen  and pelvis without contrast and compared to previous CT scans of the  abdomen and pelvis dated 12/02/2021. The following findings are present:  1. The kidneys are normal in size and there is dilatation of the left  renal collecting system and left ureter extending to the ureterovesical  junction that is similar to the previous exam. No obstructing stone is  identified. No right renal obstruction is seen. The bladder is somewhat  decompressed with an appearance of mild generalized wall thickening of  the bladder that is similar to the previous exam.  2. There is generalized anasarca with third spacing of fluid in the soft  tissues that is more prominent on today's exam. There is also slightly  more ascites present throughout the abdomen that extends into the pelvis  with haziness of the mesentery and clinical correlation is required.  3. There is a new small to moderate right pleural effusion and tiny left  pleural effusion and there is some adjacent dense atelectasis and  possible developing pneumonia at the right lung base.  4. The liver, spleen, pancreas, and both adrenal glands are  unremarkable. There are several gallstones within the gallbladder,  unchanged.  5. There is no aortic aneurysm. However, there are several enlarged  retroperitoneal lymph nodes measuring  up to 2.5 cm which are similar to  the previous study. These are nonspecific but could relate to a  lymphoproliferative disorder or neoplasm and further clinical  correlation is required.  6. The large and small bowel loops are normal in caliber and show no  inflammatory change. The remainder of the pelvis is unremarkable.  7. At bone windows, no suspicious bone lesions are seen.                 Radiation dose reduction techniques were utilized, including automated  exposure control and exposure modulation based on body size.     This report was finalized on 1/9/2022 10:27 AM by Dr. Ronnie Ritchie M.D.             Results for orders placed during the hospital encounter of 11/29/21    Adult Transthoracic Echo Complete W/ Cont if Necessary Per Protocol    Interpretation Summary  · Calculated left ventricular EF = 61.8% Estimated left ventricular EF was in agreement with the calculated left ventricular EF. Left ventricular systolic function is normal.  · Left ventricular diastolic function is consistent with (grade II w/high LAP) pseudonormalization.  · The left atrial cavity is mildly dilated.  · Trace tricuspid valve regurgitation is present. Calculated right ventricular systolic pressure from tricuspid regurgitation is 21.4 mmHg.    No orders to display     Assessment/Plan   Assessment/Plan   Active Hospital Problems    Diagnosis  POA   • **Pyelonephritis [N12]  Yes   • Calculus of gallbladder without biliary obstruction [K80.20]  Yes   • Pleural effusion on right [J90]  Yes   • Acute on chronic diastolic CHF (congestive heart failure) ([facility]) [I50.33]  Yes   • ESRD (end stage renal disease) ([facility]) [N18.6]  Yes   • CAD (coronary artery disease) [I25.10]  Yes   • HTN (hypertension) [I10]  Yes   • Type 2 diabetes mellitus with hyperglycemia, with long-term current use of insulin ([facility]) [E11.65, Z79.4]  Not Applicable   • Rheumatoid arthritis ([facility]) [M06.9]  Yes      Resolved Hospital Problems   No resolved problems to  display.       56 y.o. female admitted with Pyelonephritis.    Patient with generalized hypervolemia likely secondary to poor dialysis compliance.  She has been seen by nephrology who plans for hemodialysis in the morning.  Will restart her preadmission medications which hopefully will help bring her BP down some this evening.  Will ask gastroenterology to see regarding abnormal LFTs.  This may correlate with her right-sided abdominal/flank pain.  She was given antibiotic in the ED for possible UTI which will be continued but will transition to Zosyn to cover UTI and gallbladder.  Right upper quadrant ultrasound has been ordered.  She does have gallstones present on CT scan but no obvious biliary ductal dilatation.  Consider consultation with general surgery.  Will monitor blood glucose and continue her home insulin regimen.  Will make correctional factor insulin available as needed.  Will monitor pleural effusion for now in hopes improves with fluid removal.  Pulmonary status is stable at present.      SCDs for DVT prophylaxis.  Full code.  Discussed with patient, family, nursing staff and ED provider.      Fran Yo MD  [facility]  01/09/22  18:05 EST

## 2022-01-09 NOTE — CONSULTS
Patient Care Team:  Karson Oreilly MD as PCP - General (Family Medicine)    Chief complaint: ESRD    Subjective     History of Present Illness  57yo with h/o ESRD on dialysis TTS at [place].  She had presented to ER with increase right flank pain over the last 2 days.  She had dialysis yesterday but only for about 1 hour.  She notes that she has difficulty staying full dialysis treatment due to RLS.  We were asked to see her for dialysis needs.    Review of Systems   Constitutional: Negative for chills and fever.   Respiratory: Negative for cough and shortness of breath.    Gastrointestinal: Positive for abdominal pain.   Genitourinary: Negative for dysuria.   Musculoskeletal: Positive for back pain.   Skin: Negative for rash.   Neurological: Negative for headaches.        Past Medical History:   Diagnosis Date   • Acute on chronic diastolic CHF (congestive heart failure) (HCC)    • CAD (coronary artery disease) 12/20/2021   • Diabetes (HCC)    • Disease of thyroid gland    • GERD (gastroesophageal reflux disease)    • Hypertension    • Rheumatoid arthritis (HCC)    ,   Past Surgical History:   Procedure Laterality Date   • EYE SURGERY     • HYSTERECTOMY     • INSERTION HEMODIALYSIS CATHETER N/A 12/6/2021    Procedure: HEMODIALYSIS CATHETER INSERTION;  Surgeon: Keli Salazar MD;  Location: [facility] 18/19;  Service: Vascular;  Laterality: N/A;   , History reviewed. No pertinent family history.,   Social History     Socioeconomic History   • Marital status:    Tobacco Use   • Smoking status: Never Smoker   • Smokeless tobacco: Never Used   Vaping Use   • Vaping Use: Never used   Substance and Sexual Activity   • Alcohol use: Never   • Drug use: Never   • Sexual activity: Defer     E-cigarette/Vaping   • E-cigarette/Vaping Use Never User      E-cigarette/Vaping Substances     E-cigarette/Vaping Devices       ,   Medications Prior to Admission   Medication Sig Dispense Refill Last Dose    • acetaminophen (TYLENOL) 325 MG tablet Take 650 mg by mouth Every 4 (Four) Hours As Needed for Mild Pain .   1/9/2022 at Unknown time   • amLODIPine (NORVASC) 5 MG tablet Take 5 mg by mouth Daily.   1/8/2022 at Unknown time   • aspirin (aspirin) 81 MG EC tablet Take 81 mg by mouth Daily.   1/8/2022 at Unknown time   • folic acid (FOLVITE) 1 MG tablet Take 1 mg by mouth Daily.   1/8/2022 at Unknown time   • insulin glargine (LANTUS, SEMGLEE) 100 UNIT/ML injection Inject 10 Units under the skin into the appropriate area as directed Daily.   1/9/2022 at Unknown time   • insulin lispro (humaLOG) 100 UNIT/ML injection Inject 3 Units under the skin into the appropriate area as directed 3 (Three) Times a Day Before Meals.   1/8/2022 at Unknown time   • levothyroxine (SYNTHROID, LEVOTHROID) 125 MCG tablet Take 125 mcg by mouth Daily.   1/9/2022 at Unknown time   • metoprolol tartrate (LOPRESSOR) 25 MG tablet Take 25 mg by mouth 2 (Two) Times a Day.   1/8/2022 at Unknown time   • multivitamin with minerals (MULTIVITAMIN ADULT PO) Take 1 tablet by mouth Daily.   1/8/2022 at Unknown time   • OLANZapine (zyPREXA) 5 MG tablet Take 5 mg by mouth 2 (Two) Times a Day.   1/8/2022 at Unknown time   • pantoprazole (PROTONIX) 40 MG EC tablet Take 40 mg by mouth Daily.   1/8/2022 at Unknown time   • rOPINIRole (REQUIP) 0.25 MG tablet Take 0.75 mg by mouth every night at bedtime.   1/8/2022 at Unknown time   • sertraline (ZOLOFT) 100 MG tablet Take 100 mg by mouth Daily.   1/8/2022 at Unknown time   • tamsulosin (FLOMAX) 0.4 MG capsule 24 hr capsule Take 1 capsule by mouth Daily.   1/8/2022 at Unknown time   • glucose blood (Accu-Chek Liz Plus) test strip 4 (Four) Times a Day.      • glucose blood (Accu-Chek Guide) test strip 1 each by Other route 4 (Four) Times a Day.      , Scheduled Meds:  amLODIPine, 5 mg, Oral, Daily  aspirin, 81 mg, Oral, Daily  [START ON 1/10/2022] cefTRIAXone, 1 g, Intravenous, Q24H  folic acid, 1 mg, Oral,  Daily  insulin lispro, 3 Units, Subcutaneous, TID With Meals  levothyroxine, 125 mcg, Oral, Daily  metoprolol tartrate, 25 mg, Oral, BID  multivitamin with minerals, 1 tablet, Oral, Daily  OLANZapine, 5 mg, Oral, BID  pantoprazole, 40 mg, Oral, Daily  rOPINIRole, 0.75 mg, Oral, Nightly  sertraline, 100 mg, Oral, Daily  tamsulosin, 0.4 mg, Oral, Daily    , Continuous Infusions:   , PRN Meds:  •  acetaminophen  •  melatonin  •  nitroglycerin  •  ondansetron **OR** ondansetron and Allergies:  Patient has no known allergies.    Objective     Vital Signs  Temp:  [97.5 °F (36.4 °C)-97.7 °F (36.5 °C)] 97.7 °F (36.5 °C)  Heart Rate:  [48-60] 48  Resp:  [12-20] 20  BP: (130-218)/() 216/96    I/O this shift:  In: 100 [IV Piggyback:100]  Out: -   No intake/output data recorded.    Physical Exam  Constitutional:       Appearance: Normal appearance.   HENT:      Nose: Nose normal.      Mouth/Throat:      Mouth: Mucous membranes are moist.   Eyes:      General: No scleral icterus.     Pupils: Pupils are equal, round, and reactive to light.   Cardiovascular:      Rate and Rhythm: Normal rate and regular rhythm.      Pulses: Normal pulses.      Heart sounds: Normal heart sounds.   Pulmonary:      Effort: Pulmonary effort is normal.      Breath sounds: Normal breath sounds.   Abdominal:      General: Abdomen is flat.   Musculoskeletal:         General: Normal range of motion.      Cervical back: Normal range of motion.      Right lower leg: Edema present.      Left lower leg: Edema present.   Skin:     General: Skin is warm and dry.   Neurological:      General: No focal deficit present.      Mental Status: She is alert.         Results Review:    I reviewed the patient's new clinical results.    WBC WBC   Date Value Ref Range Status   01/09/2022 11.39 (H) 3.40 - 10.80 10*3/mm3 Final      HGB Hemoglobin   Date Value Ref Range Status   01/09/2022 9.2 (L) 12.0 - 15.9 g/dL Final      HCT Hematocrit   Date Value Ref Range Status    01/09/2022 28.3 (L) 34.0 - 46.6 % Final      Platlets No results found for: LABPLAT   MCV MCV   Date Value Ref Range Status   01/09/2022 86.3 79.0 - 97.0 fL Final          Sodium Sodium   Date Value Ref Range Status   01/09/2022 135 (L) 136 - 145 mmol/L Final      Potassium Potassium   Date Value Ref Range Status   01/09/2022 4.6 3.5 - 5.2 mmol/L Final      Chloride Chloride   Date Value Ref Range Status   01/09/2022 96 (L) 98 - 107 mmol/L Final      CO2 CO2   Date Value Ref Range Status   01/09/2022 22.7 22.0 - 29.0 mmol/L Final      BUN BUN   Date Value Ref Range Status   01/09/2022 45 (H) 6 - 20 mg/dL Final      Creatinine Creatinine   Date Value Ref Range Status   01/09/2022 3.57 (H) 0.57 - 1.00 mg/dL Final      Calcium Calcium   Date Value Ref Range Status   01/09/2022 8.2 (L) 8.6 - 10.5 mg/dL Final      PO4 No results found for: CAPO4   Albumin Albumin   Date Value Ref Range Status   01/09/2022 2.90 (L) 3.50 - 5.20 g/dL Final      Magnesium No results found for: MG   Uric Acid No results found for: URICACID         Assessment/Plan       Pyelonephritis      Assessment & Plan   ESRD-  Normal dialysis at [facility].  TDC for access.  Will plan dialysis in AM for volume and monitor.  Discussed with her and  importance of staying on dialysis full treatment.  Volume Overload-  Adding lasix 40mg bid and UF with dialysis tomorrow.  Discussed fluid restriction to prevent large intra dialytic weight gain.  Anemia-  Monitor, not far from goal.  Should improve with volume control.  HTN-  UF as tolarted.  Flank Pain-  No evidence of stone on CT.    RLS-  Will adjust requip to bid to see if this helps on dialysis.  DMII-  Poor control previously.    I discussed the patients findings and my recommendations with patient    Jorge Mcneil MD  01/09/22  17:11 EST

## 2022-01-09 NOTE — ED NOTES
Patient was placed in face mask in first look. Patient was wearing facemask when I entered the room and throughout our encounter. I wore full protective equipment throughout this patient encounter including a face mask, and gloves. Hand hygiene was performed before donning protective equipment and after removal when leaving the room.     Diane Nuñez RN  01/09/22 0659

## 2022-01-09 NOTE — CONSULTS
"[facility]  Initial Inpatient Consult Note    Referring Provider: A    Reason for Consultation: Elevated liver enzymes.    Subjective     History of present illness:      Thank you for requesting my opinion.    56-year-old woman with a past medical history of ESRD on dialysis, RA, type 2 diabetes, coronary artery disease and known to our service from recent admission with enteritis, admitted to the ER from her rehab facility for right-sided flank pain.  Gastroenterology is consulted for elevated liver enzymes.    She says she has had right-sided abdominal pain for the past 2 to 3 days.  It comes from her flank and radiates downward.  She has been a little nauseated but not vomiting.  She has noticed that her urine looks like \"Mountain Dew.\"    Her alkaline phosphatase is elevated up to 1021, ALT is up to 46, AST is up to 77. On December 9 alkaline phosphatase was 317, ALT was 52 and AST was 81.  Her total bilirubin is elevated at 5.6, it was previously normal.  CT imaging notes cholelithiasis but does not comment on the bile ducts.    She still remains tender in her right side.  She was having some loose stools but none in the past couple of days.    [facility].  Her  endorses that she is still quite weak.  She has not been home since the end of November.        Past Medical History:  Past Medical History:   Diagnosis Date   • Acute on chronic diastolic CHF (congestive heart failure) (HCC)    • CAD (coronary artery disease) 12/20/2021   • Diabetes (HCC)    • Disease of thyroid gland    • GERD (gastroesophageal reflux disease)    • Hypertension    • Rheumatoid arthritis (HCC)        Past Surgical History:  Past Surgical History:   Procedure Laterality Date   • EYE SURGERY     • HYSTERECTOMY     • INSERTION HEMODIALYSIS CATHETER N/A 12/6/2021    Procedure: HEMODIALYSIS CATHETER INSERTION;  Surgeon: Keli Salazar MD;  Location: [facility]" HYBRID OR 18/19;  Service: Vascular;  Laterality: N/A;        Social History:   Social History     Tobacco Use   • Smoking status: Never Smoker   • Smokeless tobacco: Never Used   Substance Use Topics   • Alcohol use: Never        Family History:  History reviewed. No pertinent family history.    Home Meds:  Medications Prior to Admission   Medication Sig Dispense Refill Last Dose   • acetaminophen (TYLENOL) 325 MG tablet Take 650 mg by mouth Every 4 (Four) Hours As Needed for Mild Pain .   1/9/2022 at Unknown time   • amLODIPine (NORVASC) 5 MG tablet Take 5 mg by mouth Daily.   1/8/2022 at Unknown time   • aspirin (aspirin) 81 MG EC tablet Take 81 mg by mouth Daily.   1/8/2022 at Unknown time   • folic acid (FOLVITE) 1 MG tablet Take 1 mg by mouth Daily.   1/8/2022 at Unknown time   • insulin glargine (LANTUS, SEMGLEE) 100 UNIT/ML injection Inject 10 Units under the skin into the appropriate area as directed Daily.   1/9/2022 at Unknown time   • insulin lispro (humaLOG) 100 UNIT/ML injection Inject 3 Units under the skin into the appropriate area as directed 3 (Three) Times a Day Before Meals.   1/8/2022 at Unknown time   • levothyroxine (SYNTHROID, LEVOTHROID) 125 MCG tablet Take 125 mcg by mouth Daily.   1/9/2022 at Unknown time   • metoprolol tartrate (LOPRESSOR) 25 MG tablet Take 25 mg by mouth 2 (Two) Times a Day.   1/8/2022 at Unknown time   • multivitamin with minerals (MULTIVITAMIN ADULT PO) Take 1 tablet by mouth Daily.   1/8/2022 at Unknown time   • OLANZapine (zyPREXA) 5 MG tablet Take 5 mg by mouth 2 (Two) Times a Day.   1/8/2022 at Unknown time   • pantoprazole (PROTONIX) 40 MG EC tablet Take 40 mg by mouth Daily.   1/8/2022 at Unknown time   • rOPINIRole (REQUIP) 0.25 MG tablet Take 0.75 mg by mouth every night at bedtime.   1/8/2022 at Unknown time   • sertraline (ZOLOFT) 100 MG tablet Take 100 mg by mouth Daily.   1/8/2022 at Unknown time   • tamsulosin (FLOMAX) 0.4 MG capsule 24 hr capsule Take 1  capsule by mouth Daily.   1/8/2022 at Unknown time   • glucose blood (Accu-Chek Liz Plus) test strip 4 (Four) Times a Day.      • glucose blood (Accu-Chek Guide) test strip 1 each by Other route 4 (Four) Times a Day.          Current Meds:   amLODIPine, 5 mg, Oral, Daily  aspirin, 81 mg, Oral, Daily  [START ON 1/10/2022] cefTRIAXone, 1 g, Intravenous, Q24H  folic acid, 1 mg, Oral, Daily  insulin lispro, 3 Units, Subcutaneous, TID With Meals  levothyroxine, 125 mcg, Oral, Daily  metoprolol tartrate, 25 mg, Oral, BID  multivitamin with minerals, 1 tablet, Oral, Daily  OLANZapine, 5 mg, Oral, BID  pantoprazole, 40 mg, Oral, Daily  rOPINIRole, 0.75 mg, Oral, Nightly  sertraline, 100 mg, Oral, Daily  tamsulosin, 0.4 mg, Oral, Daily        Allergies:  No Known Allergies    Review of Systems  All systems were reviewed and negative except for:  Gastrointestinal: positive for  diarrhea, nausea and pain     Objective     Vital Signs  Temp:  [97.5 °F (36.4 °C)-97.7 °F (36.5 °C)] 97.7 °F (36.5 °C)  Heart Rate:  [48-60] 48  Resp:  [12-20] 20  BP: (130-218)/() 216/96    Physical Exam:  Constitutional:   Alert, cooperative, in no acute distress, appears older than stated age, chronically ill   Eyes:           Lids and lashes normal, conjunctivae and sclerae normal,   no icterus   Ears, nose, mouth and throat:  Normal appearance of external ears and nose, no oral l  lesions, no thrush, oral mucosa moist   Respiratory:    Clear to auscultation, respirations regular, even and             unlabored    Cardiovascular:   Regular rhythm and normal rate, normal S1 and S2, no        murmur, no gallop, palpable distal pulses, no lower extremity edema   Gastrointestinal:    Soft, nondistended, tender to palpation, no guarding, no rebound tenderness, normal bowel sounds, no palpable masses or organomegaly  Rectal exam: deferred   Musculoskeletal:  Normal station, no atrophy, no tenderness to palpation, normal digits and nails   Skin:   Normal color, no bleeding, bruising, rashes or lesions   Lymphatics:  No palpable cervical or supraclavicular adenopathy   Psychiatric:  Judgement and insight: Poor historian   Orientation to person, place and time: normal   Mood and affect: normal       Results Review:   I reviewed the patient's new clinical results.    Results from last 7 days   Lab Units 01/09/22  0833   WBC 10*3/mm3 11.39*   HEMOGLOBIN g/dL 9.2*   HEMATOCRIT % 28.3*   PLATELETS 10*3/mm3 244       Results from last 7 days   Lab Units 01/09/22  0946   SODIUM mmol/L 135*   POTASSIUM mmol/L 4.6   CHLORIDE mmol/L 96*   CO2 mmol/L 22.7   BUN mg/dL 45*   CREATININE mg/dL 3.57*   CALCIUM mg/dL 8.2*   BILIRUBIN mg/dL 5.6*   ALK PHOS U/L 1,021*   ALT (SGPT) U/L 46*   AST (SGOT) U/L 77*   GLUCOSE mg/dL 217*             Lab Results   Lab Value Date/Time    LIPASE 21 12/19/2021 1314       Radiology:  Imaging Results (Last 72 Hours)     Procedure Component Value Units Date/Time    CT Abdomen Pelvis Without Contrast [[id number]] Collected: 01/09/22 0841     Updated: 01/09/22 1030    Narrative:      CT SCAN OF THE ABDOMEN AND PELVIS WITHOUT CONTRAST     HISTORY: Right flank pain.     The CT scan was performed as an emergency procedure through the abdomen  and pelvis without contrast and compared to previous CT scans of the  abdomen and pelvis dated 12/02/2021. The following findings are present:  1. The kidneys are normal in size and there is dilatation of the left  renal collecting system and left ureter extending to the ureterovesical  junction that is similar to the previous exam. No obstructing stone is  identified. No right renal obstruction is seen. The bladder is somewhat  decompressed with an appearance of mild generalized wall thickening of  the bladder that is similar to the previous exam.  2. There is generalized anasarca with third spacing of fluid in the soft  tissues that is more prominent on today's exam. There is also slightly  more ascites  present throughout the abdomen that extends into the pelvis  with haziness of the mesentery and clinical correlation is required.  3. There is a new small to moderate right pleural effusion and tiny left  pleural effusion and there is some adjacent dense atelectasis and  possible developing pneumonia at the right lung base.  4. The liver, spleen, pancreas, and both adrenal glands are  unremarkable. There are several gallstones within the gallbladder,  unchanged.  5. There is no aortic aneurysm. However, there are several enlarged  retroperitoneal lymph nodes measuring up to 2.5 cm which are similar to  the previous study. These are nonspecific but could relate to a  lymphoproliferative disorder or neoplasm and further clinical  correlation is required.  6. The large and small bowel loops are normal in caliber and show no  inflammatory change. The remainder of the pelvis is unremarkable.  7. At bone windows, no suspicious bone lesions are seen.                 Radiation dose reduction techniques were utilized, including automated  exposure control and exposure modulation based on body size.     This report was finalized on 1/9/2022 10:27 AM by Dr. Ronnie Ritchie M.D.             Assessment/Plan       Pyelonephritis      Impression  1.  Elevated liver enzymes: Obstructive pattern.  With cholelithiasis on imaging, I do wonder if she has passed a CBD stone or if one is still stuck    2.  Cholelithiasis on imaging    3.  ESRD on dialysis    4.  Debility: This 56-year-old woman is in a rehab facility    Plan  Follow transaminases  Right upper quadrant ultrasound to see if we can get a good look at the ducts.  Given her ESRD, a little risky to pursue MRCP  ?  General surgery consult for cholelithiasis  Dialysis as per renal  Further recommendations following lab work-up and ultrasound    I discussed the patients findings and my recommendations with patient, family and Nephrology    All necessary PPE, including face mask  and eye protection, were worn during this encounter.  Hand sanitization was performed both before and after the patient interaction.    Tesha Suarez MD  [facility]      Dictated utilizing Dragon dictation

## 2022-01-09 NOTE — ED PROVIDER NOTES
EMERGENCY DEPARTMENT ENCOUNTER    Room Number:  41/41  Date of encounter:  1/9/2022  PCP: Karson Oreilly MD  Historian: Patient      PPE    Patient was placed in face mask in first look. Patient was wearing facemask when I entered the room and throughout our encounter. I wore full protective equipment throughout this patient encounter including a N 95 face mask, and gloves. Hand hygiene was performed before donning protective equipment and after removal when leaving the room.        HPI:  Chief Complaint: Right flank pain  A complete HPI/ROS/PMH/PSH/SH/FH are unobtainable due to: Nothing    Context: Zaina Martinez is a 56 y.o. female, with history of ESRD on dialysis, rheumatoid arthritis, diabetes, hypertension and coronary artery disease, who arrives to the ED via EMS from the [facility].  Patient presents with c/o moderate, constant, sharp right flank pain that began 2 days ago.   Patient also complains of pain that radiates down into her right lower abdomen.  Patient denies fever, chills, nausea, vomiting, dysuria or any other symptoms.  Patient states that she has history of kidney stones and this feels very similar.  She states that she has had to have surgical intervention in the past and has not ever been able to pass one of her stones.  Patient states that nothing makes the symptoms better and nothing worsens symptoms.          PAST MEDICAL HISTORY  Active Ambulatory Problems     Diagnosis Date Noted   • MAYUR (acute kidney injury) ([facility]) 11/29/2021   • HTN (hypertension) 11/29/2021   • Hypothyroidism 11/29/2021   • Type 2 diabetes mellitus with hyperglycemia, with long-term current use of insulin ([facility]) 11/29/2021   • Rheumatoid arthritis ([facility]) 11/29/2021   • Angioedema 11/30/2021   • GERD (gastroesophageal reflux disease) 11/30/2021   • Anemia 11/30/2021   • Medically noncompliant 12/01/2021   • Myocardial infarction due to demand ischemia (HCC) 12/01/2021   • Enteritis 12/03/2021   • PRES (posterior  reversible encephalopathy syndrome) 12/05/2021   • Urine retention 12/08/2021   • Klebsiella infection 12/08/2021   • Superficial thrombophlebitis 12/10/2021   • Generalized weakness 12/19/2021   • ESRD (end stage renal disease) ([facility]) 12/20/2021   • CAD (coronary artery disease) 12/20/2021   • Abnormal urinalysis 12/20/2021   • Acute on chronic diastolic CHF (congestive heart failure) ([facility]) 12/25/2021     Resolved Ambulatory Problems     Diagnosis Date Noted   • Hypertensive urgency 11/30/2021     Past Medical History:   Diagnosis Date   • Diabetes ([facility])    • Disease of thyroid gland    • Hypertension          PAST SURGICAL HISTORY  Past Surgical History:   Procedure Laterality Date   • EYE SURGERY     • HYSTERECTOMY     • INSERTION HEMODIALYSIS CATHETER N/A 12/6/2021    Procedure: HEMODIALYSIS CATHETER INSERTION;  Surgeon: Keli Salazar MD;  Location: [facility] 18/19;  Service: Vascular;  Laterality: N/A;         FAMILY HISTORY  History reviewed. No pertinent family history.      SOCIAL HISTORY  Social History     Socioeconomic History   • Marital status:    Tobacco Use   • Smoking status: Never Smoker   • Smokeless tobacco: Never Used   Vaping Use   • Vaping Use: Never used   Substance and Sexual Activity   • Alcohol use: Never   • Drug use: Never   • Sexual activity: Defer         ALLERGIES  Patient has no known allergies.        REVIEW OF SYSTEMS  Review of Systems     All systems reviewed and negative except for those discussed in HPI.        PHYSICAL EXAM    ED Triage Vitals [01/09/22 0614]   Temp Heart Rate Resp BP SpO2   97.5 °F (36.4 °C) 60 16 130/90 99 %       Physical Exam  GENERAL: Appears older than stated age, nontoxic appearing, mildly distressed secondary to pain  HENT: normocephalic, atraumatic  EYES: no scleral icterus, PERRL  CV: regular rhythm, regular rate, no murmur, dialysis catheter noted to right chest  RESPIRATORY: normal effort, CTAB  ABDOMEN: soft, normal bowel  sounds, right lower abdomen tenderness, no rebound, guarding or rigidity  Right flank pain, no CVA tenderness  MUSCULOSKELETAL: Patient has bilateral lower extremity edema  NEURO: alert, moves all extremities, follows commands, mental status normal/baseline  SKIN: warm, dry, no rash   Psych: Appropriate mood and affect  Nursing notes and vital signs reviewed      LAB RESULTS  Recent Results (from the past 24 hour(s))   Urinalysis With Microscopic If Indicated (No Culture) - Urine, Clean Catch    Collection Time: 01/09/22  7:14 AM    Specimen: Urine, Clean Catch   Result Value Ref Range    Color, UA Dark Yellow (A) Yellow, Straw    Appearance, UA Cloudy (A) Clear    pH, UA 6.0 5.0 - 8.0    Specific Gravity, UA 1.024 1.005 - 1.030    Glucose,  mg/dL (2+) (A) Negative    Ketones, UA Negative Negative    Bilirubin, UA Moderate (2+) (A) Negative    Blood, UA Small (1+) (A) Negative    Protein, UA >=300 mg/dL (3+) (A) Negative    Leuk Esterase, UA Small (1+) (A) Negative    Nitrite, UA Negative Negative    Urobilinogen, UA 2.0 E.U./dL (A) 0.2 - 1.0 E.U./dL   Urinalysis, Microscopic Only - Urine, Clean Catch    Collection Time: 01/09/22  7:14 AM    Specimen: Urine, Clean Catch   Result Value Ref Range    RBC, UA 0-2 None Seen, 0-2 /HPF    WBC, UA Too Numerous to Count (A) None Seen, 0-2 /HPF    Bacteria, UA 2+ (A) None Seen /HPF    Squamous Epithelial Cells, UA 3-6 (A) None Seen, 0-2 /HPF    Hyaline Casts, UA None Seen None Seen /LPF    Granular Casts, UA 0-2 None Seen /LPF    Methodology Manual Light Microscopy    CBC Auto Differential    Collection Time: 01/09/22  8:33 AM    Specimen: Blood   Result Value Ref Range    WBC 11.39 (H) 3.40 - 10.80 10*3/mm3    RBC 3.28 (L) 3.77 - 5.28 10*6/mm3    Hemoglobin 9.2 (L) 12.0 - 15.9 g/dL    Hematocrit 28.3 (L) 34.0 - 46.6 %    MCV 86.3 79.0 - 97.0 fL    MCH 28.0 26.6 - 33.0 pg    MCHC 32.5 31.5 - 35.7 g/dL    RDW 18.2 (H) 12.3 - 15.4 %    RDW-SD 56.7 (H) 37.0 - 54.0 fl     MPV 10.8 6.0 - 12.0 fL    Platelets 244 140 - 450 10*3/mm3    Neutrophil % 76.4 (H) 42.7 - 76.0 %    Lymphocyte % 12.0 (L) 19.6 - 45.3 %    Monocyte % 7.9 5.0 - 12.0 %    Eosinophil % 2.5 0.3 - 6.2 %    Basophil % 0.6 0.0 - 1.5 %    Immature Grans % 0.6 (H) 0.0 - 0.5 %    Neutrophils, Absolute 8.70 (H) 1.70 - 7.00 10*3/mm3    Lymphocytes, Absolute 1.37 0.70 - 3.10 10*3/mm3    Monocytes, Absolute 0.90 0.10 - 0.90 10*3/mm3    Eosinophils, Absolute 0.28 0.00 - 0.40 10*3/mm3    Basophils, Absolute 0.07 0.00 - 0.20 10*3/mm3    Immature Grans, Absolute 0.07 (H) 0.00 - 0.05 10*3/mm3    nRBC 0.0 0.0 - 0.2 /100 WBC   Comprehensive Metabolic Panel    Collection Time: 01/09/22  9:46 AM    Specimen: Blood   Result Value Ref Range    Glucose 217 (H) 65 - 99 mg/dL    BUN 45 (H) 6 - 20 mg/dL    Creatinine 3.57 (H) 0.57 - 1.00 mg/dL    Sodium 135 (L) 136 - 145 mmol/L    Potassium 4.6 3.5 - 5.2 mmol/L    Chloride 96 (L) 98 - 107 mmol/L    CO2 22.7 22.0 - 29.0 mmol/L    Calcium 8.2 (L) 8.6 - 10.5 mg/dL    Total Protein 6.5 6.0 - 8.5 g/dL    Albumin 2.90 (L) 3.50 - 5.20 g/dL    ALT (SGPT) 46 (H) 1 - 33 U/L    AST (SGOT) 77 (H) 1 - 32 U/L    Alkaline Phosphatase 1,021 (H) 39 - 117 U/L    Total Bilirubin 5.6 (H) 0.0 - 1.2 mg/dL    eGFR Non African Amer 13 (L) >60 mL/min/1.73    eGFR  African Amer      Globulin 3.6 gm/dL    A/G Ratio 0.8 g/dL    BUN/Creatinine Ratio 12.6 7.0 - 25.0    Anion Gap 16.3 (H) 5.0 - 15.0 mmol/L       Ordered the above labs and independently reviewed the results.      RADIOLOGY  CT Abdomen Pelvis Without Contrast    Result Date: 1/9/2022  CT SCAN OF THE ABDOMEN AND PELVIS WITHOUT CONTRAST  HISTORY: Right flank pain.  The CT scan was performed as an emergency procedure through the abdomen and pelvis without contrast and compared to previous CT scans of the abdomen and pelvis dated 12/02/2021. The following findings are present: 1. The kidneys are normal in size and there is dilatation of the left renal collecting  system and left ureter extending to the ureterovesical junction that is similar to the previous exam. No obstructing stone is identified. No right renal obstruction is seen. The bladder is somewhat decompressed with an appearance of mild generalized wall thickening of the bladder that is similar to the previous exam. 2. There is generalized anasarca with third spacing of fluid in the soft tissues that is more prominent on today's exam. There is also slightly more ascites present throughout the abdomen that extends into the pelvis with haziness of the mesentery and clinical correlation is required. 3. There is a new small to moderate right pleural effusion and tiny left pleural effusion and there is some adjacent dense atelectasis and possible developing pneumonia at the right lung base. 4. The liver, spleen, pancreas, and both adrenal glands are unremarkable. There are several gallstones within the gallbladder, unchanged. 5. There is no aortic aneurysm. However, there are several enlarged retroperitoneal lymph nodes measuring up to 2.5 cm which are similar to the previous study. These are nonspecific but could relate to a lymphoproliferative disorder or neoplasm and further clinical correlation is required. 6. The large and small bowel loops are normal in caliber and show no inflammatory change. The remainder of the pelvis is unremarkable. 7. At bone windows, no suspicious bone lesions are seen.      Radiation dose reduction techniques were utilized, including automated exposure control and exposure modulation based on body size.  This report was finalized on 1/9/2022 10:27 AM by Dr. Ronnie Ritchie M.D.        I ordered the above noted radiological studies and viewed the images on the PACS system.         MEDICAL RECORD REVIEW  Medical records reviewed in [facility], patient was discharged from the hospital on 12/29/2021 after being admitted for acute on chronic diastolic CHF, ESRD, coronary artery  disease.      PROCEDURES    Procedures        DIFFERENTIAL DIAGNOSIS  Differential diagnoses include but are not limited to the following: Kidney stone, ureteral stone, pyelonephritis, urinary tract infection, musculoskeletal pain, electrolyte abnormality      PROGRESS, DATA ANALYSIS, CONSULTS, AND MEDICAL DECISION MAKING        ED Course as of 01/09/22 1443   Sun Jan 09, 2022   0715 Discussed pertinent information from history and physical exam with patient.  Discussed differential diagnosis and plan for ED evaluation/work-up and treatment including labs, urinalysis and CT of the abdomen pelvis to evaluate for urinary tract infection, pyelonephritis, kidney stone.  All questions answered.  Patient is agreeable with this plan.  Not given to patient secondary to she is a dialysis patient and she has a history of CHF.  Will give patient IV Dilaudid and Zofran to help control pain.   [MS]   0917  Discussed with Dr. Ritchie regarding the tibia abdomen and pelvis results which revealed new small to moderate right pleural effusion, left hydronephrosis that is unchanged since previous scan on December 2, anasarca, ascites, no stone seen on the right.  See dictation for official radiology interpretation.     [MS]   0918 Reviewed pt's history and workup with Dr. Cody.  After a bedside evaluation, they agree with the plan of care.       [MS]   0918 Delay in labs secondary to patient was a extremely difficult  stick. [MS]   1215 Consult Note    Discussed care with Dr Yo  Reviewed patient's history, exam, results and need for admission secondary to pyelonephritis, hyperbilirubinemia, ESRD, anasarca, ascites, right pleural effusion  Dr. Fontanez accepts the patient to be admitted to telemetry inpatient bed.     [MS]      ED Course User Index  [MS] Ayesha Salgado, APRN     ADMISSION    Discussed treatment plan and reason for admission with pt/family and admitting physician.  Pt/family voiced understanding of the plan  for admission for further testing/treatment as needed.      DIAGNOSIS  Final diagnoses:   Pyelonephritis   Hyperbilirubinemia   ESRD (end stage renal disease) (HCC)   Anasarca   Other ascites   Pleural effusion on right           MEDICATIONS GIVEN IN ED    Medications   sodium chloride 0.9 % flush 10 mL (has no administration in time range)   HYDROmorphone (DILAUDID) injection 0.5 mg (0.5 mg Intravenous Given 1/9/22 0832)   ondansetron (ZOFRAN) injection 4 mg (4 mg Intravenous Given 1/9/22 0831)   cefTRIAXone (ROCEPHIN) 2 g in sodium chloride 0.9 % 100 mL IVPB-VTB (0 g Intravenous Stopped 1/9/22 1158)   HYDROmorphone (DILAUDID) injection 0.5 mg (0.5 mg Intravenous Given 1/9/22 1116)   ondansetron (ZOFRAN) injection 4 mg (4 mg Intravenous Given 1/9/22 1116)   ondansetron (ZOFRAN) injection 4 mg (4 mg Intravenous Given 1/9/22 1417)   HYDROmorphone (DILAUDID) injection 0.5 mg (0.5 mg Intravenous Given 1/9/22 1417)           COURSE & MEDICAL DECISION MAKING  Any/All labs and Any/All Imaging studies that were ordered were reviewed and are noted above.  Results were reviewed/discussed with the patient and they were also made aware of online access.    Pt also made aware that some labs, such as cultures, will not be resulted during ER visit and followup with PMD is necessary.        Ayesha Salgado, APRN  01/09/22 1449

## 2022-01-10 ENCOUNTER — ANESTHESIA (OUTPATIENT)
Dept: PERIOP | Facility: HOSPITAL | Age: 57
End: 2022-01-10

## 2022-01-10 ENCOUNTER — APPOINTMENT (OUTPATIENT)
Dept: GENERAL RADIOLOGY | Facility: HOSPITAL | Age: 57
End: 2022-01-10

## 2022-01-10 ENCOUNTER — ANESTHESIA EVENT (OUTPATIENT)
Dept: PERIOP | Facility: HOSPITAL | Age: 57
End: 2022-01-10

## 2022-01-10 LAB
ALBUMIN SERPL-MCNC: 2.6 G/DL (ref 3.5–5.2)
ALBUMIN/GLOB SERPL: 0.8 G/DL
ALP SERPL-CCNC: 811 U/L (ref 39–117)
ALT SERPL W P-5'-P-CCNC: 35 U/L (ref 1–33)
ANION GAP SERPL CALCULATED.3IONS-SCNC: 14.7 MMOL/L (ref 5–15)
AST SERPL-CCNC: 47 U/L (ref 1–32)
BILIRUB SERPL-MCNC: 3.8 MG/DL (ref 0–1.2)
BUN SERPL-MCNC: 51 MG/DL (ref 6–20)
BUN/CREAT SERPL: 12.7 (ref 7–25)
CALCIUM SPEC-SCNC: 8.1 MG/DL (ref 8.6–10.5)
CHLORIDE SERPL-SCNC: 96 MMOL/L (ref 98–107)
CO2 SERPL-SCNC: 21.3 MMOL/L (ref 22–29)
CREAT SERPL-MCNC: 4.03 MG/DL (ref 0.57–1)
DEPRECATED RDW RBC AUTO: 57.7 FL (ref 37–54)
ERYTHROCYTE [DISTWIDTH] IN BLOOD BY AUTOMATED COUNT: 18.4 % (ref 12.3–15.4)
GFR SERPL CREATININE-BSD FRML MDRD: 11 ML/MIN/1.73
GFR SERPL CREATININE-BSD FRML MDRD: ABNORMAL ML/MIN/{1.73_M2}
GLOBULIN UR ELPH-MCNC: 3.2 GM/DL
GLUCOSE BLDC GLUCOMTR-MCNC: 115 MG/DL (ref 70–130)
GLUCOSE BLDC GLUCOMTR-MCNC: 119 MG/DL (ref 70–130)
GLUCOSE BLDC GLUCOMTR-MCNC: 158 MG/DL (ref 70–130)
GLUCOSE BLDC GLUCOMTR-MCNC: 195 MG/DL (ref 70–130)
GLUCOSE SERPL-MCNC: 196 MG/DL (ref 65–99)
HBA1C MFR BLD: 6.9 % (ref 4.8–5.6)
HCT VFR BLD AUTO: 26 % (ref 34–46.6)
HGB BLD-MCNC: 8.2 G/DL (ref 12–15.9)
MCH RBC QN AUTO: 27.5 PG (ref 26.6–33)
MCHC RBC AUTO-ENTMCNC: 31.5 G/DL (ref 31.5–35.7)
MCV RBC AUTO: 87.2 FL (ref 79–97)
PLATELET # BLD AUTO: 233 10*3/MM3 (ref 140–450)
PMV BLD AUTO: 11.9 FL (ref 6–12)
POTASSIUM SERPL-SCNC: 4.7 MMOL/L (ref 3.5–5.2)
PROT SERPL-MCNC: 5.8 G/DL (ref 6–8.5)
RBC # BLD AUTO: 2.98 10*6/MM3 (ref 3.77–5.28)
SODIUM SERPL-SCNC: 132 MMOL/L (ref 136–145)
WBC NRBC COR # BLD: 10.91 10*3/MM3 (ref 3.4–10.8)

## 2022-01-10 PROCEDURE — BF101ZZ FLUOROSCOPY OF BILE DUCTS USING LOW OSMOLAR CONTRAST: ICD-10-PCS | Performed by: SURGERY

## 2022-01-10 PROCEDURE — 25010000002 ONDANSETRON PER 1 MG: Performed by: NURSE ANESTHETIST, CERTIFIED REGISTERED

## 2022-01-10 PROCEDURE — 74300 X-RAY BILE DUCTS/PANCREAS: CPT

## 2022-01-10 PROCEDURE — 25010000002 FENTANYL CITRATE (PF) 50 MCG/ML SOLUTION: Performed by: NURSE ANESTHETIST, CERTIFIED REGISTERED

## 2022-01-10 PROCEDURE — 25010000002 HEPARIN (PORCINE) PER 1000 UNITS: Performed by: INTERNAL MEDICINE

## 2022-01-10 PROCEDURE — C1889 IMPLANT/INSERT DEVICE, NOC: HCPCS | Performed by: SURGERY

## 2022-01-10 PROCEDURE — 25010000002 MORPHINE PER 10 MG: Performed by: HOSPITALIST

## 2022-01-10 PROCEDURE — 80053 COMPREHEN METABOLIC PANEL: CPT | Performed by: HOSPITALIST

## 2022-01-10 PROCEDURE — 25010000002 PROPOFOL 10 MG/ML EMULSION: Performed by: NURSE ANESTHETIST, CERTIFIED REGISTERED

## 2022-01-10 PROCEDURE — 82962 GLUCOSE BLOOD TEST: CPT

## 2022-01-10 PROCEDURE — 99232 SBSQ HOSP IP/OBS MODERATE 35: CPT | Performed by: INTERNAL MEDICINE

## 2022-01-10 PROCEDURE — 99222 1ST HOSP IP/OBS MODERATE 55: CPT | Performed by: SURGERY

## 2022-01-10 PROCEDURE — 0 IOTHALAMATE 60 % SOLUTION: Performed by: SURGERY

## 2022-01-10 PROCEDURE — 25010000002 PIPERACILLIN SOD-TAZOBACTAM PER 1 G: Performed by: HOSPITALIST

## 2022-01-10 PROCEDURE — 25010000002 DROPERIDOL PER 5 MG: Performed by: NURSE ANESTHETIST, CERTIFIED REGISTERED

## 2022-01-10 PROCEDURE — 25010000002 NALOXONE PER 1 MG: Performed by: NURSE ANESTHETIST, CERTIFIED REGISTERED

## 2022-01-10 PROCEDURE — 83036 HEMOGLOBIN GLYCOSYLATED A1C: CPT | Performed by: HOSPITALIST

## 2022-01-10 PROCEDURE — 25010000002 NEOSTIGMINE 5 MG/10ML SOLUTION: Performed by: NURSE ANESTHETIST, CERTIFIED REGISTERED

## 2022-01-10 PROCEDURE — 5A1D70Z PERFORMANCE OF URINARY FILTRATION, INTERMITTENT, LESS THAN 6 HOURS PER DAY: ICD-10-PCS | Performed by: INTERNAL MEDICINE

## 2022-01-10 PROCEDURE — 85027 COMPLETE CBC AUTOMATED: CPT | Performed by: HOSPITALIST

## 2022-01-10 PROCEDURE — 47563 LAPARO CHOLECYSTECTOMY/GRAPH: CPT | Performed by: SURGERY

## 2022-01-10 PROCEDURE — 0FT44ZZ RESECTION OF GALLBLADDER, PERCUTANEOUS ENDOSCOPIC APPROACH: ICD-10-PCS | Performed by: SURGERY

## 2022-01-10 PROCEDURE — 88304 TISSUE EXAM BY PATHOLOGIST: CPT | Performed by: SURGERY

## 2022-01-10 PROCEDURE — 63710000001 INSULIN LISPRO (HUMAN) PER 5 UNITS: Performed by: HOSPITALIST

## 2022-01-10 DEVICE — LIGAMAX 5 MM ENDOSCOPIC MULTIPLE CLIP APPLIER
Type: IMPLANTABLE DEVICE | Site: ABDOMEN | Status: FUNCTIONAL
Brand: LIGAMAX

## 2022-01-10 DEVICE — SEAL HEMO SURG ARISTA/AH ABS/PWDR 3GM: Type: IMPLANTABLE DEVICE | Site: ABDOMEN | Status: FUNCTIONAL

## 2022-01-10 RX ORDER — OXYCODONE AND ACETAMINOPHEN 7.5; 325 MG/1; MG/1
1 TABLET ORAL EVERY 4 HOURS PRN
Status: DISCONTINUED | OUTPATIENT
Start: 2022-01-10 | End: 2022-01-10 | Stop reason: HOSPADM

## 2022-01-10 RX ORDER — FLUMAZENIL 0.1 MG/ML
0.2 INJECTION INTRAVENOUS AS NEEDED
Status: DISCONTINUED | OUTPATIENT
Start: 2022-01-10 | End: 2022-01-10 | Stop reason: HOSPADM

## 2022-01-10 RX ORDER — FENTANYL CITRATE 50 UG/ML
INJECTION, SOLUTION INTRAMUSCULAR; INTRAVENOUS AS NEEDED
Status: DISCONTINUED | OUTPATIENT
Start: 2022-01-10 | End: 2022-01-10 | Stop reason: SURG

## 2022-01-10 RX ORDER — DIPHENHYDRAMINE HCL 25 MG
25 CAPSULE ORAL
Status: DISCONTINUED | OUTPATIENT
Start: 2022-01-10 | End: 2022-01-10 | Stop reason: HOSPADM

## 2022-01-10 RX ORDER — NEOSTIGMINE METHYLSULFATE 0.5 MG/ML
INJECTION, SOLUTION INTRAVENOUS AS NEEDED
Status: DISCONTINUED | OUTPATIENT
Start: 2022-01-10 | End: 2022-01-10 | Stop reason: SURG

## 2022-01-10 RX ORDER — GLYCOPYRROLATE 0.2 MG/ML
INJECTION INTRAMUSCULAR; INTRAVENOUS AS NEEDED
Status: DISCONTINUED | OUTPATIENT
Start: 2022-01-10 | End: 2022-01-10 | Stop reason: SURG

## 2022-01-10 RX ORDER — SODIUM CHLORIDE 0.9 % (FLUSH) 0.9 %
3-10 SYRINGE (ML) INJECTION AS NEEDED
Status: DISCONTINUED | OUTPATIENT
Start: 2022-01-10 | End: 2022-01-10 | Stop reason: HOSPADM

## 2022-01-10 RX ORDER — HYDROCODONE BITARTRATE AND ACETAMINOPHEN 5; 325 MG/1; MG/1
1 TABLET ORAL EVERY 4 HOURS PRN
Status: DISCONTINUED | OUTPATIENT
Start: 2022-01-10 | End: 2022-01-15 | Stop reason: HOSPADM

## 2022-01-10 RX ORDER — HYDROMORPHONE HYDROCHLORIDE 1 MG/ML
0.5 INJECTION, SOLUTION INTRAMUSCULAR; INTRAVENOUS; SUBCUTANEOUS
Status: DISCONTINUED | OUTPATIENT
Start: 2022-01-10 | End: 2022-01-10 | Stop reason: HOSPADM

## 2022-01-10 RX ORDER — LIDOCAINE HYDROCHLORIDE 10 MG/ML
0.5 INJECTION, SOLUTION EPIDURAL; INFILTRATION; INTRACAUDAL; PERINEURAL ONCE AS NEEDED
Status: DISCONTINUED | OUTPATIENT
Start: 2022-01-10 | End: 2022-01-10 | Stop reason: HOSPADM

## 2022-01-10 RX ORDER — HYDROCODONE BITARTRATE AND ACETAMINOPHEN 7.5; 325 MG/1; MG/1
1 TABLET ORAL ONCE AS NEEDED
Status: DISCONTINUED | OUTPATIENT
Start: 2022-01-10 | End: 2022-01-10 | Stop reason: HOSPADM

## 2022-01-10 RX ORDER — FENTANYL CITRATE 50 UG/ML
50 INJECTION, SOLUTION INTRAMUSCULAR; INTRAVENOUS
Status: DISCONTINUED | OUTPATIENT
Start: 2022-01-10 | End: 2022-01-10 | Stop reason: HOSPADM

## 2022-01-10 RX ORDER — PROPOFOL 10 MG/ML
VIAL (ML) INTRAVENOUS AS NEEDED
Status: DISCONTINUED | OUTPATIENT
Start: 2022-01-10 | End: 2022-01-10 | Stop reason: SURG

## 2022-01-10 RX ORDER — LABETALOL HYDROCHLORIDE 5 MG/ML
5 INJECTION, SOLUTION INTRAVENOUS
Status: DISCONTINUED | OUTPATIENT
Start: 2022-01-10 | End: 2022-01-10 | Stop reason: HOSPADM

## 2022-01-10 RX ORDER — SODIUM CHLORIDE 9 MG/ML
9 INJECTION, SOLUTION INTRAVENOUS CONTINUOUS PRN
Status: DISCONTINUED | OUTPATIENT
Start: 2022-01-10 | End: 2022-01-10 | Stop reason: HOSPADM

## 2022-01-10 RX ORDER — ROCURONIUM BROMIDE 10 MG/ML
INJECTION, SOLUTION INTRAVENOUS AS NEEDED
Status: DISCONTINUED | OUTPATIENT
Start: 2022-01-10 | End: 2022-01-10 | Stop reason: SURG

## 2022-01-10 RX ORDER — LIDOCAINE HYDROCHLORIDE 20 MG/ML
INJECTION, SOLUTION INFILTRATION; PERINEURAL AS NEEDED
Status: DISCONTINUED | OUTPATIENT
Start: 2022-01-10 | End: 2022-01-10 | Stop reason: SURG

## 2022-01-10 RX ORDER — PROMETHAZINE HYDROCHLORIDE 25 MG/1
25 SUPPOSITORY RECTAL ONCE AS NEEDED
Status: DISCONTINUED | OUTPATIENT
Start: 2022-01-10 | End: 2022-01-10 | Stop reason: HOSPADM

## 2022-01-10 RX ORDER — HEPARIN SODIUM 1000 [USP'U]/ML
4000 INJECTION, SOLUTION INTRAVENOUS; SUBCUTANEOUS AS NEEDED
Status: DISCONTINUED | OUTPATIENT
Start: 2022-01-10 | End: 2022-01-15 | Stop reason: HOSPADM

## 2022-01-10 RX ORDER — SODIUM CHLORIDE 9 MG/ML
INJECTION, SOLUTION INTRAVENOUS AS NEEDED
Status: DISCONTINUED | OUTPATIENT
Start: 2022-01-10 | End: 2022-01-10 | Stop reason: HOSPADM

## 2022-01-10 RX ORDER — BUPIVACAINE HYDROCHLORIDE 2.5 MG/ML
INJECTION, SOLUTION EPIDURAL; INFILTRATION; INTRACAUDAL AS NEEDED
Status: DISCONTINUED | OUTPATIENT
Start: 2022-01-10 | End: 2022-01-10 | Stop reason: HOSPADM

## 2022-01-10 RX ORDER — NALOXONE HCL 0.4 MG/ML
0.2 VIAL (ML) INJECTION AS NEEDED
Status: DISCONTINUED | OUTPATIENT
Start: 2022-01-10 | End: 2022-01-10 | Stop reason: HOSPADM

## 2022-01-10 RX ORDER — ONDANSETRON 2 MG/ML
INJECTION INTRAMUSCULAR; INTRAVENOUS AS NEEDED
Status: DISCONTINUED | OUTPATIENT
Start: 2022-01-10 | End: 2022-01-10 | Stop reason: SURG

## 2022-01-10 RX ORDER — ONDANSETRON 2 MG/ML
4 INJECTION INTRAMUSCULAR; INTRAVENOUS ONCE AS NEEDED
Status: DISCONTINUED | OUTPATIENT
Start: 2022-01-10 | End: 2022-01-10 | Stop reason: HOSPADM

## 2022-01-10 RX ORDER — HYDRALAZINE HYDROCHLORIDE 20 MG/ML
5 INJECTION INTRAMUSCULAR; INTRAVENOUS
Status: DISCONTINUED | OUTPATIENT
Start: 2022-01-10 | End: 2022-01-10 | Stop reason: HOSPADM

## 2022-01-10 RX ORDER — CEFAZOLIN SODIUM 2 G/100ML
2 INJECTION, SOLUTION INTRAVENOUS ONCE
Status: DISCONTINUED | OUTPATIENT
Start: 2022-01-10 | End: 2022-01-10

## 2022-01-10 RX ORDER — MAGNESIUM HYDROXIDE 1200 MG/15ML
LIQUID ORAL AS NEEDED
Status: DISCONTINUED | OUTPATIENT
Start: 2022-01-10 | End: 2022-01-10 | Stop reason: HOSPADM

## 2022-01-10 RX ORDER — DROPERIDOL 2.5 MG/ML
INJECTION, SOLUTION INTRAMUSCULAR; INTRAVENOUS AS NEEDED
Status: DISCONTINUED | OUTPATIENT
Start: 2022-01-10 | End: 2022-01-10 | Stop reason: SURG

## 2022-01-10 RX ORDER — PROMETHAZINE HYDROCHLORIDE 25 MG/1
25 TABLET ORAL ONCE AS NEEDED
Status: DISCONTINUED | OUTPATIENT
Start: 2022-01-10 | End: 2022-01-10 | Stop reason: HOSPADM

## 2022-01-10 RX ORDER — IBUPROFEN 600 MG/1
600 TABLET ORAL ONCE AS NEEDED
Status: DISCONTINUED | OUTPATIENT
Start: 2022-01-10 | End: 2022-01-10 | Stop reason: HOSPADM

## 2022-01-10 RX ORDER — SODIUM CHLORIDE 0.9 % (FLUSH) 0.9 %
3 SYRINGE (ML) INJECTION EVERY 12 HOURS SCHEDULED
Status: DISCONTINUED | OUTPATIENT
Start: 2022-01-10 | End: 2022-01-10 | Stop reason: HOSPADM

## 2022-01-10 RX ORDER — DIPHENHYDRAMINE HYDROCHLORIDE 50 MG/ML
12.5 INJECTION INTRAMUSCULAR; INTRAVENOUS
Status: DISCONTINUED | OUTPATIENT
Start: 2022-01-10 | End: 2022-01-10 | Stop reason: HOSPADM

## 2022-01-10 RX ORDER — NALOXONE HYDROCHLORIDE 0.4 MG/ML
INJECTION, SOLUTION INTRAMUSCULAR; INTRAVENOUS; SUBCUTANEOUS AS NEEDED
Status: DISCONTINUED | OUTPATIENT
Start: 2022-01-10 | End: 2022-01-10 | Stop reason: SURG

## 2022-01-10 RX ORDER — EPHEDRINE SULFATE 50 MG/ML
5 INJECTION, SOLUTION INTRAVENOUS ONCE AS NEEDED
Status: DISCONTINUED | OUTPATIENT
Start: 2022-01-10 | End: 2022-01-10 | Stop reason: HOSPADM

## 2022-01-10 RX ADMIN — HEPARIN SODIUM 4000 UNITS: 1000 INJECTION INTRAVENOUS; SUBCUTANEOUS at 16:42

## 2022-01-10 RX ADMIN — NALXONE HYDROCHLORIDE 0.08 MG: 0.4 INJECTION INTRAMUSCULAR; INTRAVENOUS; SUBCUTANEOUS at 19:17

## 2022-01-10 RX ADMIN — FUROSEMIDE 40 MG: 40 TABLET ORAL at 08:07

## 2022-01-10 RX ADMIN — INSULIN LISPRO 2 UNITS: 100 INJECTION, SOLUTION INTRAVENOUS; SUBCUTANEOUS at 08:02

## 2022-01-10 RX ADMIN — PROPOFOL 100 MG: 10 INJECTION, EMULSION INTRAVENOUS at 17:45

## 2022-01-10 RX ADMIN — TAZOBACTAM SODIUM AND PIPERACILLIN SODIUM 3.38 G: 375; 3 INJECTION, SOLUTION INTRAVENOUS at 05:05

## 2022-01-10 RX ADMIN — SUGAMMADEX 200 MG: 100 INJECTION, SOLUTION INTRAVENOUS at 19:11

## 2022-01-10 RX ADMIN — PANTOPRAZOLE SODIUM 40 MG: 40 TABLET, DELAYED RELEASE ORAL at 08:07

## 2022-01-10 RX ADMIN — GLYCOPYRROLATE 0.6 MG: 0.2 INJECTION INTRAMUSCULAR; INTRAVENOUS at 19:01

## 2022-01-10 RX ADMIN — DROPERIDOL 0.62 MG: 2.5 INJECTION, SOLUTION INTRAMUSCULAR; INTRAVENOUS at 18:24

## 2022-01-10 RX ADMIN — SERTRALINE 100 MG: 100 TABLET, FILM COATED ORAL at 08:07

## 2022-01-10 RX ADMIN — TAMSULOSIN HYDROCHLORIDE 0.4 MG: 0.4 CAPSULE ORAL at 08:07

## 2022-01-10 RX ADMIN — SODIUM CHLORIDE 9 ML/HR: 9 INJECTION, SOLUTION INTRAVENOUS at 17:31

## 2022-01-10 RX ADMIN — FOLIC ACID 1 MG: 1 TABLET ORAL at 08:07

## 2022-01-10 RX ADMIN — FENTANYL CITRATE 50 MCG: 0.05 INJECTION, SOLUTION INTRAMUSCULAR; INTRAVENOUS at 17:45

## 2022-01-10 RX ADMIN — ASPIRIN 81 MG: 81 TABLET, COATED ORAL at 08:07

## 2022-01-10 RX ADMIN — ONDANSETRON 4 MG: 2 INJECTION INTRAMUSCULAR; INTRAVENOUS at 18:51

## 2022-01-10 RX ADMIN — LIDOCAINE HYDROCHLORIDE 100 MG: 20 INJECTION, SOLUTION INFILTRATION; PERINEURAL at 17:45

## 2022-01-10 RX ADMIN — TAZOBACTAM SODIUM AND PIPERACILLIN SODIUM 3.38 G: 375; 3 INJECTION, SOLUTION INTRAVENOUS at 17:08

## 2022-01-10 RX ADMIN — INSULIN LISPRO 3 UNITS: 100 INJECTION, SOLUTION INTRAVENOUS; SUBCUTANEOUS at 08:02

## 2022-01-10 RX ADMIN — NALXONE HYDROCHLORIDE 0.04 MG: 0.4 INJECTION INTRAMUSCULAR; INTRAVENOUS; SUBCUTANEOUS at 19:20

## 2022-01-10 RX ADMIN — MULTIPLE VITAMINS W/ MINERALS TAB 1 TABLET: TAB at 08:07

## 2022-01-10 RX ADMIN — HYDROCODONE BITARTRATE AND ACETAMINOPHEN 1 TABLET: 5; 325 TABLET ORAL at 12:08

## 2022-01-10 RX ADMIN — HYDROCODONE BITARTRATE AND ACETAMINOPHEN 1 TABLET: 5; 325 TABLET ORAL at 02:17

## 2022-01-10 RX ADMIN — FENTANYL CITRATE 50 MCG: 0.05 INJECTION, SOLUTION INTRAMUSCULAR; INTRAVENOUS at 18:05

## 2022-01-10 RX ADMIN — OLANZAPINE 5 MG: 5 TABLET ORAL at 08:07

## 2022-01-10 RX ADMIN — NEOSTIGMINE METHYLSULFATE 3 MG: 0.5 INJECTION INTRAVENOUS at 19:01

## 2022-01-10 RX ADMIN — ROCURONIUM BROMIDE 40 MG: 50 INJECTION INTRAVENOUS at 17:45

## 2022-01-10 RX ADMIN — LEVOTHYROXINE SODIUM 125 MCG: 0.12 TABLET ORAL at 08:07

## 2022-01-10 RX ADMIN — HYDROCODONE BITARTRATE AND ACETAMINOPHEN 1 TABLET: 5; 325 TABLET ORAL at 08:02

## 2022-01-10 RX ADMIN — ROPINIROLE HYDROCHLORIDE 0.5 MG: 0.5 TABLET, FILM COATED ORAL at 08:10

## 2022-01-10 RX ADMIN — MORPHINE SULFATE 2 MG: 2 INJECTION, SOLUTION INTRAMUSCULAR; INTRAVENOUS at 09:41

## 2022-01-10 NOTE — OP NOTE
Operative Note :   Ramana Raygoza MD    Zaina Martinez  1965    Procedure Date: 01/10/22    Pre-op Diagnosis:  Calculus of gallbladder with acute on chronic cholecystitis without obstruction [K80.12]    Post-Op Diagnosis:  Acute cholecystitis    Procedure:   · Laparoscopic cholecystectomy with cholangiogram    Surgeon: Ramana Raygoza MD    Assistant: Assistant: Amparo Maldonado PA-C was responsible for performing the following activities: Retraction, Suction, Irrigation, Suturing, Closing, Placing Dressing and Held/Positioned Camera and their skilled assistance was necessary for the success of this case.    Anesthesia:  General Anesthesia via Endotracheal Tube    EBL:   50 cc    Specimens:   Gallbladder and contents    Indications:  · 56-year-old female with chronic renal failure presenting with acute right-sided abdominal pain and history, physical exam, imaging and laboratory studies consistent with cholecystitis    Findings:   · Evidence of acute cholecystitis  · No evidence of biliary obstruction    Recommendations:   · Routine post cholecystectomy care    Description of procedure:    After obtaining informed consent, patient was brought to the operating room and placed under a general anesthetic.  The abdomen was sterilely prepped and draped.  Supraumbilical skin incision was made and dissected through the subcutaneous tissue.  Fascia was incised in the midline.  #1 Vicryl suture was placed on each side of the fascia in a U stitch pattern.  The peritoneum was bluntly penetrated.  Medina trocar was introduced and the abdomen was insufflated with CO2.  Cursory examination of the abdomen demonstrated evidence of bilious tinged ascites, particularly around the liver.  Additional 5 mm trocars were introduced, one in the epigastrium and 2 in the right upper quadrant.  Fundus of the gallbladder was identified, grasped and retracted cephalad.  Some adhesions to the duodenum and omentum were taken down off the  infundibulum of the gallbladder.  The infundibulum was then grasped and retracted laterally.  The peritoneum overlying the cystic triangle was identified and peeled down.  The cystic duct was dissected and identified.  The cystic artery was dissected and identified.  The remainder of the cystic triangle was cleared out, and the lower half of the gallbladder was then mobilized off the bed of the liver, giving the critical view of safety.  2 clips were placed proximally and one distally on the cystic artery.  A clip was placed on the gallbladder side of the gallbladder cystic duct junction.  An incision was made in the cystic duct.  The cholangiocatheter was introduced through the abdominal wall and clipped into good position in the cystic duct.  We then shot our cholangiogram.  There is immediate good filling of the duodenum.  No filling defects were seen.  The proximal biliary radicles filled nicely.  The cystic duct, duct junction was visualized.  Next the cholangiocatheter was withdrawn and 2 clips were placed on the common bile duct side of the cystic duct and the cystic duct was divided between clips.  The cystic artery was divided between clips.  The gallbladder was then removed from the bed of the liver with electrocautery and then placed in an Endo Catch bag and removed.  The abdomen was irrigated.  Any small bleeders on the liver bed were cauterized.  Mercedes powder was placed in the subhepatic space.  The trochars were withdrawn under direct visualization.  The supraumbilical fascial incision was reapproximated with #1 Vicryl suture.  Skin incisions were closed with 4-0 Monocryl.  Sterile dressings were applied.    Ramana Raygoza MD  General and Endoscopic Surgery  [facility]    [address], [address]  [place], [place], [address]  P: [phone]

## 2022-01-10 NOTE — PLAN OF CARE
Goal Outcome Evaluation:   Patient during night tour not in any distress some anxiety and restless observed / pain covered with PRN's per request with good effect / slept overnight / pending stool sample to be collected (no BM on night tour) / during sleep hours O2 via NC reattached to keep SATs higher 91 %/ ABT zosyn tolerated well / sinus bradycardia oin 40's to 50 's while sleeps / will continue to monitor/

## 2022-01-10 NOTE — DISCHARGE PLACEMENT REQUEST
"Zaina Martinez (56 y.o. Female)             Date of Birth Social Security Number Address Home Phone MRN    1965  [id number] [place] [id number] [phone] [id number]    Jainism Marital Status             None        Admission Date Admission Type Admitting Provider Attending Provider Department, Room/Bed    1/9/22 Emergency Fran Yo MD Ray, Jonathan, MD [address], [address]    Discharge Date Discharge Disposition Discharge Destination                         Attending Provider: Fran Yo MD    Allergies: No Known Allergies    Isolation: Spore   Infection: C.difficile (rule out) (01/09/22)   Code Status: CPR   Advance Care Planning Activity    Ht: 157.5 cm (62\")   Wt: 82.3 kg (181 lb 8 oz)    Admission Cmt: None   Principal Problem: Pyelonephritis [N12]                 Active Insurance as of 1/9/2022     Primary Coverage     Payor Plan Insurance Group Employer/Plan Group    ANTHEM MEDICARE REPLACEMENT ANTHEM MEDICARE ADVANTAGE [id number]     Payor Plan Address Payor Plan Phone Number Payor Plan Fax Number Effective Dates    [address] [phone]  1/1/2019 - None Entered    [facility] [id number]       Subscriber Name Subscriber Birth Date Member ID       ZAINA MARTINEZ 1965 [id number]                 Emergency Contacts      (Rel.) Home Phone Work Phone Mobile Phone    Marv Martinez (Spouse) -- -- [phone]    JuanFiona bradford (Daughter) -- -- [phone]              "

## 2022-01-10 NOTE — PLAN OF CARE
Problem: Adult Inpatient Plan of Care  Goal: Plan of Care Review  Outcome: Ongoing, Progressing  Flowsheets (Taken 1/10/2022 1838)  Progress: no change  Plan of Care Reviewed With:   patient   spouse  Outcome Summary: Pt awake alert and oriented this a.m.  Pt went to have dialysis this afternoon and went straight to OR with Dr. Raygoza for cholecystectomy.  Pt had a lot of pain this a.m.  Spouse in room awaiting pt return.  Goal: Patient-Specific Goal (Individualized)  Outcome: Ongoing, Progressing  Goal: Absence of Hospital-Acquired Illness or Injury  Outcome: Ongoing, Progressing  Intervention: Identify and Manage Fall Risk  Recent Flowsheet Documentation  Taken 1/10/2022 1600 by Dana Millan RN  Safety Promotion/Fall Prevention: patient off unit  Taken 1/10/2022 1400 by Dana Millan RN  Safety Promotion/Fall Prevention: patient off unit  Taken 1/10/2022 1200 by Dana Millan RN  Safety Promotion/Fall Prevention:   assistive device/personal items within reach   clutter free environment maintained   fall prevention program maintained   nonskid shoes/slippers when out of bed   room organization consistent   safety round/check completed  Taken 1/10/2022 0941 by Dana Millan RN  Safety Promotion/Fall Prevention:   assistive device/personal items within reach   clutter free environment maintained   fall prevention program maintained   room organization consistent   safety round/check completed  Taken 1/10/2022 0800 by Dana Millan RN  Safety Promotion/Fall Prevention:   activity supervised   assistive device/personal items within reach   clutter free environment maintained   fall prevention program maintained   nonskid shoes/slippers when out of bed   room organization consistent   safety round/check completed  Intervention: Prevent Skin Injury  Recent Flowsheet Documentation  Taken 1/10/2022 1200 by Dana Millan RN  Body Position: position changed independently  Taken 1/10/2022 0941 by  Dana Millan RN  Body Position: position changed independently  Taken 1/10/2022 0800 by Dana Millan RN  Body Position: position changed independently  Skin Protection:   adhesive use limited   electrode sites changed   incontinence pads utilized   tubing/devices free from skin contact  Intervention: Prevent and Manage VTE (venous thromboembolism) Risk  Recent Flowsheet Documentation  Taken 1/10/2022 0800 by Dana Millan RN  VTE Prevention/Management:   bilateral   dorsiflexion/plantar flexion performed  Intervention: Prevent Infection  Recent Flowsheet Documentation  Taken 1/10/2022 1200 by Dana Millan RN  Infection Prevention:   hand hygiene promoted   personal protective equipment utilized  Taken 1/10/2022 0941 by Dana Millan RN  Infection Prevention:   hand hygiene promoted   personal protective equipment utilized  Taken 1/10/2022 0800 by Dana Millan RN  Infection Prevention:   personal protective equipment utilized   hand hygiene promoted  Goal: Optimal Comfort and Wellbeing  Outcome: Ongoing, Progressing  Intervention: Provide Person-Centered Care  Recent Flowsheet Documentation  Taken 1/10/2022 0800 by Dana Millan RN  Trust Relationship/Rapport:   care explained   choices provided   questions answered   questions encouraged   reassurance provided   thoughts/feelings acknowledged  Goal: Readiness for Transition of Care  Outcome: Ongoing, Progressing     Problem: UTI (Urinary Tract Infection)  Goal: Improved Infection Symptoms  Outcome: Ongoing, Progressing     Problem: Adjustment to Illness (Chronic Kidney Disease)  Goal: Optimal Coping with Chronic Illness  Outcome: Ongoing, Progressing  Intervention: Support and Optimize Psychosocial Response to Chronic Illness  Recent Flowsheet Documentation  Taken 1/10/2022 0800 by Dana Millan RN  Supportive Measures: active listening utilized     Problem: Electrolyte Imbalance (Chronic Kidney Disease)  Goal: Electrolyte  Balance  Outcome: Ongoing, Progressing     Problem: Fluid Volume Excess (Chronic Kidney Disease)  Goal: Fluid Balance  Outcome: Ongoing, Progressing  Intervention: Monitor and Manage Hypervolemia  Recent Flowsheet Documentation  Taken 1/10/2022 0800 by Dana Millan RN  Skin Protection:   adhesive use limited   electrode sites changed   incontinence pads utilized   tubing/devices free from skin contact     Problem: Functional Decline (Chronic Kidney Disease)  Goal: Optimal Functional Ability  Outcome: Ongoing, Progressing  Intervention: Optimize Functional Ability  Recent Flowsheet Documentation  Taken 1/10/2022 1200 by Dana Millan RN  Activity Management: activity encouraged  Taken 1/10/2022 0941 by Dana Millan RN  Activity Management: activity encouraged  Taken 1/10/2022 0800 by Dana Millan RN  Activity Management: activity encouraged     Problem: Hematologic Alteration (Chronic Kidney Disease)  Goal: Absence of Anemia Signs and Symptoms  Outcome: Ongoing, Progressing  Intervention: Monitor and Manage Signs/Symptoms of Anemia and Bleeding  Recent Flowsheet Documentation  Taken 1/10/2022 0800 by Dana Millan RN  Environmental Support:   calm environment promoted   personal routine supported   environmental consistency promoted     Problem: Oral Intake Inadequate (Chronic Kidney Disease)  Goal: Optimal Oral Intake  Outcome: Ongoing, Progressing     Problem: Renal Function Impairment (Chronic Kidney Disease)  Goal: Laboratory Values and Blood Pressure Within Desired Range  Outcome: Ongoing, Progressing  Intervention: Monitor and Support Renal Function  Recent Flowsheet Documentation  Taken 1/10/2022 0800 by Dana Millan RN  Medication Review/Management:   medications reviewed   high-risk medications identified     Problem: Device-Related Complication Risk (Hemodialysis)  Goal: Safe, Effective Therapy Delivery  Outcome: Ongoing, Progressing  Intervention: Optimize Device Care and  Function  Recent Flowsheet Documentation  Taken 1/10/2022 0800 by Dana Millan RN  Medication Review/Management:   medications reviewed   high-risk medications identified     Problem: Hemodynamic Instability (Hemodialysis)  Goal: Vital Signs Remain in Desired Range  Outcome: Ongoing, Progressing     Problem: Infection (Hemodialysis)  Goal: Absence of Infection Signs and Symptoms  Outcome: Ongoing, Progressing  Intervention: Prevent or Manage Infection  Recent Flowsheet Documentation  Taken 1/10/2022 1200 by Dana Millan RN  Infection Prevention:   hand hygiene promoted   personal protective equipment utilized  Taken 1/10/2022 0941 by Dana Millan RN  Infection Prevention:   hand hygiene promoted   personal protective equipment utilized  Taken 1/10/2022 0800 by Dana Millan RN  Infection Prevention:   personal protective equipment utilized   hand hygiene promoted     Problem: Fall Injury Risk  Goal: Absence of Fall and Fall-Related Injury  Outcome: Ongoing, Progressing  Intervention: Identify and Manage Contributors to Fall Injury Risk  Recent Flowsheet Documentation  Taken 1/10/2022 0800 by Dana Millan RN  Medication Review/Management:   medications reviewed   high-risk medications identified  Intervention: Promote Injury-Free Environment  Recent Flowsheet Documentation  Taken 1/10/2022 1600 by Dana Millan RN  Safety Promotion/Fall Prevention: patient off unit  Taken 1/10/2022 1400 by Dana Millan RN  Safety Promotion/Fall Prevention: patient off unit  Taken 1/10/2022 1200 by Dana Millan RN  Safety Promotion/Fall Prevention:   assistive device/personal items within reach   clutter free environment maintained   fall prevention program maintained   nonskid shoes/slippers when out of bed   room organization consistent   safety round/check completed  Taken 1/10/2022 0941 by Dana Millan RN  Safety Promotion/Fall Prevention:   assistive device/personal items within reach    clutter free environment maintained   fall prevention program maintained   room organization consistent   safety round/check completed  Taken 1/10/2022 0800 by Dana Millan RN  Safety Promotion/Fall Prevention:   activity supervised   assistive device/personal items within reach   clutter free environment maintained   fall prevention program maintained   nonskid shoes/slippers when out of bed   room organization consistent   safety round/check completed     Problem: Skin Injury Risk Increased  Goal: Skin Health and Integrity  Outcome: Ongoing, Progressing  Intervention: Optimize Skin Protection  Recent Flowsheet Documentation  Taken 1/10/2022 1200 by Dana Millan, RN  Head of Bed (HOB): HOB elevated  Taken 1/10/2022 0800 by Dana Millan RN  Pressure Reduction Techniques: frequent weight shift encouraged  Head of Bed (HOB): HOB elevated  Pressure Reduction Devices: pressure-redistributing mattress utilized  Skin Protection:   adhesive use limited   electrode sites changed   incontinence pads utilized   tubing/devices free from skin contact   Goal Outcome Evaluation:  Plan of Care Reviewed With: patient, spouse        Progress: no change  Outcome Summary: Pt awake alert and oriented this a.m.  Pt went to have dialysis this afternoon and went straight to OR with Dr. Raygoza for cholecystectomy.  Pt had a lot of pain this a.m.  Spouse in room awaiting pt return.

## 2022-01-10 NOTE — CASE MANAGEMENT/SOCIAL WORK
Discharge Planning Assessment  [facility]     Patient Name: Zaina Martinez  MRN: [id number]  Today's Date: 1/10/2022    Admit Date: 1/9/2022     Discharge Needs Assessment     Row Name 01/10/22 1505       Living Environment    Lives With spouse; grandchild(pro)    Current Living Arrangements home/apartment/condo    Primary Care Provided by spouse/significant other    Provides Primary Care For no one, unable/limited ability to care for self    Family Caregiver if Needed spouse    Quality of Family Relationships helpful; involved; supportive       Transition Planning    Patient/Family Anticipates Transition to home with help/services    Patient/Family Anticipated Services at Transition home health care    Transportation Anticipated family or friend will provide       Discharge Needs Assessment    Readmission Within the Last 30 Days previous discharge plan unsuccessful    Equipment Currently Used at Home cane, straight; walker, rolling; commode    Concerns to be Addressed discharge planning    Provided Post Acute Provider List? N/A    N/A Provider List Comment Has no preference of HH agency ..........sk    Provided Post Acute Provider Quality & Resource List? N/A               Discharge Plan     Row Name 01/10/22 5033       Plan    Plan Home with spouse and HH.    Plan Comments S/W pt who gave permission for CCP to talk w/ her  Marv.  Call to Marv ([phone]).  Facesheet and pharmacy info confirmed.  Pt was admitted from [facility], but was going to be DC'd from there to home today.  Pt and Marv state pt will not return there.  Plan is to return home.  Home DME includes a cane, walker and BSC.  Marv provides transport as needed.  He requests that her HD be transferred from [facility] to [facility] [address] [place].  CCP will followup w/ [facility].  Marv requests HH and has no preference of HH agency.  Referral called to [facility].  [place] pending. ......sk              Continued Care and Services - Admitted Since  1/9/2022     Destination     Service Provider Request Status Selected Services Address Phone Fax Patient Preferred    [facility] [place]  P[facility] - [facility] N[facility]A [id number] [facility] [address] [phone] --          Home Medical Care     Service Provider Request Status Selected Services Address Phone Fax Patient Preferred     [facility]  Pending - Request Sent N/A [id number] [address] [phone] --                       Demographic Summary     Row Name 01/10/22 1504       General Information    Admission Type inpatient    Arrived From subacute/long-term acute care    Required Notices Provided Important Message from Medicare    Referral Source admission list    Reason for Consult discharge planning    Preferred Language English               Functional Status     Row Name 01/10/22 1504       Functional Status    Usual Activity Tolerance fair       Functional Status, IADL    Medications assistive person    Meal Preparation assistive person    Housekeeping assistive person    Laundry assistive person    Shopping assistive person       Employment/    Employment Status disabled                      Liset Allison RN

## 2022-01-10 NOTE — PROGRESS NOTES
[facility]  Inpatient Progress Note    Reason for Followup:  Elevated liver enzymes    Subjective     Interval History:   Still with some R sided abdominal pain.  Located in RUQ and some pain in R inguinal area as well.      Current Facility-Administered Medications:   •  acetaminophen (TYLENOL) tablet 650 mg, 650 mg, Oral, Q4H PRN, Fran Yo MD  •  albumin human 25 % IV SOLN 12.5 g, 12.5 g, Intravenous, PRN, Jorge Mcneil MD  •  amLODIPine (NORVASC) tablet 5 mg, 5 mg, Oral, Daily, Fran Yo MD, 5 mg at 01/09/22 1723  •  aspirin EC tablet 81 mg, 81 mg, Oral, Daily, Fran Yo MD, 81 mg at 01/09/22 1723  •  dextrose (D50W) (25 g/50 mL) IV injection 25 g, 25 g, Intravenous, Q15 Min PRN, Fran Yo MD  •  dextrose (GLUTOSE) oral gel 15 g, 15 g, Oral, Q15 Min PRN, Fran Yo MD  •  folic acid (FOLVITE) tablet 1 mg, 1 mg, Oral, Daily, Fran Yo MD, 1 mg at 01/09/22 1723  •  furosemide (LASIX) tablet 40 mg, 40 mg, Oral, BID, Jorge Mcneil MD, 40 mg at 01/09/22 1805  •  glucagon (human recombinant) (GLUCAGEN DIAGNOSTIC) injection 1 mg, 1 mg, Subcutaneous, Q15 Min PRN, Fran Yo MD  •  HYDROcodone-acetaminophen (NORCO) 5-325 MG per tablet 1 tablet, 1 tablet, Oral, Q6H PRN, Fran Yo MD, 1 tablet at 01/10/22 0217  •  insulin lispro (ADMELOG) injection 0-9 Units, 0-9 Units, Subcutaneous, TID With Meals, Fran Yo MD  •  insulin lispro (ADMELOG) injection 3 Units, 3 Units, Subcutaneous, TID With Meals, Fran Yo MD  •  levothyroxine (SYNTHROID, LEVOTHROID) tablet 125 mcg, 125 mcg, Oral, Daily, Fran Yo MD  •  melatonin tablet 3 mg, 3 mg, Oral, Nightly PRN, Fran Yo MD, 3 mg at 01/09/22 2014  •  metoprolol tartrate (LOPRESSOR) tablet 25 mg, 25 mg, Oral, BID, Fran Yo MD, 25 mg at 01/09/22 2015  •  morphine injection 2 mg, 2 mg, Intravenous, Q4H PRN, Fran Yo MD  •  multivitamin with minerals 1 tablet, 1 tablet, Oral, Daily, Ray,  MD Fran, 1 tablet at 01/09/22 1723  •  nitroglycerin (NITROSTAT) SL tablet 0.4 mg, 0.4 mg, Sublingual, Q5 Min PRN, Fran Yo MD  •  OLANZapine (zyPREXA) tablet 5 mg, 5 mg, Oral, BID, Fran Yo MD, 5 mg at 01/09/22 2015  •  ondansetron (ZOFRAN) tablet 4 mg, 4 mg, Oral, Q6H PRN **OR** ondansetron (ZOFRAN) injection 4 mg, 4 mg, Intravenous, Q6H PRN, Fran Yo MD  •  pantoprazole (PROTONIX) EC tablet 40 mg, 40 mg, Oral, Daily, Fran Yo MD, 40 mg at 01/09/22 1723  •  Pharmacy to Dose Zosyn, , Does not apply, Continuous PRN, Fran Yo MD  •  piperacillin-tazobactam (ZOSYN) 3.375 g in iso-osmotic dextrose 50 ml (premix), 3.375 g, Intravenous, Q12H, Fran Yo MD, 3.375 g at 01/10/22 0505  •  rOPINIRole (REQUIP) tablet 0.5 mg, 0.5 mg, Oral, Q12H, Jorge Mcneil MD, 0.5 mg at 01/09/22 2015  •  sertraline (ZOLOFT) tablet 100 mg, 100 mg, Oral, Daily, Fran Yo MD, 100 mg at 01/09/22 1723  •  tamsulosin (FLOMAX) 24 hr capsule 0.4 mg, 0.4 mg, Oral, Daily, Fran Yo MD, 0.4 mg at 01/09/22 1723  Review of Systems:    All systems were reviewed and negative except for:  Gastrointestinal: positive for  nausea and pain    Objective     Vital Signs  Temp:  [97.7 °F (36.5 °C)-98.4 °F (36.9 °C)] 97.9 °F (36.6 °C)  Heart Rate:  [48-54] 54  Resp:  [12-20] 14  BP: (146-218)/() 156/71  Body mass index is 31.28 kg/m².    Intake/Output Summary (Last 24 hours) at 1/10/2022 0642  Last data filed at 1/9/2022 1158  Gross per 24 hour   Intake 100 ml   Output --   Net 100 ml     No intake/output data recorded.     Physical Exam:   General: patient awake, alert and cooperative   Abdomen: TTP in RUQ as well as R inguinal area.  No palpable LAD   Rectal: deferred   Extremities: no rash or edema   Psychiatric: Normal mood and behavior; memory intact     Results Review:     I reviewed the patient's new clinical results.  I reviewed the patient's new imaging results and agree with the  interpretation.    Results from last 7 days   Lab Units 01/09/22  0833   WBC 10*3/mm3 11.39*   HEMOGLOBIN g/dL 9.2*   HEMATOCRIT % 28.3*   PLATELETS 10*3/mm3 244     Results from last 7 days   Lab Units 01/09/22  0946   SODIUM mmol/L 135*   POTASSIUM mmol/L 4.6   CHLORIDE mmol/L 96*   CO2 mmol/L 22.7   BUN mg/dL 45*   CREATININE mg/dL 3.57*   CALCIUM mg/dL 8.2*   BILIRUBIN mg/dL 5.6*   ALK PHOS U/L 1,021*   ALT (SGPT) U/L 46*   AST (SGOT) U/L 77*   GLUCOSE mg/dL 217*         Lab Results   Lab Value Date/Time    LIPASE 21 12/19/2021 1314       Radiology:  [unfilled]      Assessment/Plan   Assessment:   1.  Elevated liver enzymes, cholestatic pattern  2.  Cholelithiasis  3.  R sided abdominal pain  4.  ESRD on HD    All problems new to me today    Plan:   US liver reviewed, no e/o dilated bile ducts.  Multiple stones in contracted gallbladder.  ? Element of cholecystitis based on findings.    Non specific hepatitis can demonstrate similar findings on imaging, but her LFT pattern is not hepatocellular in nature.  Acute hepatitis panel negative. Prior autoimmune panel negative.  I will ask general surgery to evaluate the patient.      I discussed the patient's findings and my recommendations with patient.         Marv Malave M.D.  [facility]  [address] [place] [address] [address] [address]  [phone]

## 2022-01-10 NOTE — PROGRESS NOTES
Name: Zaina Martinez ADMIT: 2022   : 1965  PCP: Karson Oreilly MD    MRN: [id number] LOS: 1 days   AGE/SEX: 56 y.o. female  ROOM: [facility] OR/MAIN OR     Subjective   Subjective   Looks a little less ill than yesterday.  Still with right-sided abdominal pain.  Breathing okay.    Review of Systems     Objective   Objective   Vital Signs  Temp:  [97.8 °F (36.6 °C)-98.4 °F (36.9 °C)] 97.9 °F (36.6 °C)  Heart Rate:  [48-56] 56  Resp:  [14-18] 16  BP: (116-208)/(55-91) 171/77  SpO2:  [93 %-100 %] 100 %  on  Flow (L/min):  [1-2] 2;   Device (Oxygen Therapy): nasal cannula  Body mass index is 33.2 kg/m².    Intake/Output Summary (Last 24 hours) at 1/10/2022 1818  Last data filed at 1/10/2022 1600  Gross per 24 hour   Intake --   Output 4000 ml   Net -4000 ml   Net IO Since Admission: -3,900 mL [01/10/22 1818]   Wt Readings from Last 1 Encounters:   01/10/22 0700 82.3 kg (181 lb 8 oz)   22 1808 77.6 kg (171 lb)   22 1006 79.4 kg (175 lb)     Wt Readings from Last 3 Encounters:   01/10/22 82.3 kg (181 lb 8 oz)   21 71.8 kg (158 lb 3.2 oz)   21 69.9 kg (154 lb)       Physical Exam  Vitals and nursing note reviewed.   Constitutional:       General: She is not in acute distress.     Appearance: She is ill-appearing.   Neck:      Vascular: No JVD.      Trachea: No tracheal deviation.   Cardiovascular:      Rate and Rhythm: Normal rate and regular rhythm.      Heart sounds: Normal heart sounds.   Pulmonary:      Effort: Pulmonary effort is normal. No respiratory distress.      Breath sounds: Normal breath sounds.   Abdominal:      General: Bowel sounds are normal.      Palpations: Abdomen is soft.      Tenderness: There is no abdominal tenderness.   Musculoskeletal:      Right lower leg: Edema present.      Left lower leg: Edema present.   Skin:     General: Skin is warm and dry.      Coloration: Skin is pale.   Neurological:      General: No focal deficit present.      Mental Status: She  is alert and oriented to person, place, and time.   Psychiatric:         Behavior: Behavior normal. Behavior is cooperative.         Results Review     I reviewed the patient's new clinical results.  Results from last 7 days   Lab Units 01/10/22  0626 01/09/22  0833   WBC 10*3/mm3 10.91* 11.39*   HEMOGLOBIN g/dL 8.2* 9.2*   PLATELETS 10*3/mm3 233 244     Results from last 7 days   Lab Units 01/10/22  0626 01/09/22  0946   SODIUM mmol/L 132* 135*   POTASSIUM mmol/L 4.7 4.6   CHLORIDE mmol/L 96* 96*   CO2 mmol/L 21.3* 22.7   BUN mg/dL 51* 45*   CREATININE mg/dL 4.03* 3.57*   GLUCOSE mg/dL 196* 217*   EGFR IF NONAFRICN AM mL/min/1.73 11* 13*   ALBUMIN g/dL 2.60* 2.90*   BILIRUBIN mg/dL 3.8* 5.6*   ALK PHOS U/L 811* 1,021*   AST (SGOT) U/L 47* 77*   ALT (SGPT) U/L 35* 46*     Results from last 7 days   Lab Units 01/10/22  0626 01/09/22  0946   CALCIUM mg/dL 8.1* 8.2*   ALBUMIN g/dL 2.60* 2.90*     Hemoglobin A1C   Date/Time Value Ref Range Status   01/10/2022 0626 6.90 (H) 4.80 - 5.60 % Final     Glucose   Date/Time Value Ref Range Status   01/10/2022 1654 115 70 - 130 mg/dL Final     Comment:     Meter: [id number] : [id number] Valeri Rodriguez RN   01/10/2022 1116 158 (H) 70 - 130 mg/dL Final     Comment:     Meter: [id number] : [id number] Randall Woo NA   01/10/2022 0625 195 (H) 70 - 130 mg/dL Final     Comment:     Meter: [id number] : [id number] Tony Velasquezjazz NA   01/09/2022 2056 233 (H) 70 - 130 mg/dL Final     Comment:     RN Notified R and V Meter: [id number] : [id number] Sree Singh NA   01/09/2022 1625 186 (H) 70 - 130 mg/dL Final     Comment:     RN Notified R and V Meter: [id number] : [id number] Sree SAAVEDRA     US Liver  Narrative: LIVER ULTRASOUND     HISTORY: Abnormal elevated liver function tests     COMPARISON: 01/09/2022     TECHNIQUE: Grayscale and color Doppler sonographic images were obtained  through the right upper quadrant.     FINDINGS:  There is limited  visualization of the pancreas. It appeared atrophic on  the earlier CT. Sonographer measures the pancreatic duct up to 4 mm.  Multiple shadowing stones are identified within the gallbladder.  Gallbladder wall is thickened, measuring about 5 mm, although this may  be related to incomplete distention. There is some pericholecystic fluid  noted as well. These findings were also noted on the earlier CT. There  is no intra or extrahepatic biliary dilatation. Common bile duct  measures up to 5 mm. Patient does have a right pleural effusion. Liver  is enlarged, measuring up to 18.4 cm in craniocaudal dimensions. There  is some increased echogenicity of the portal triads. Appearance can be  associated with hepatitis. Right kidney measures within normal size  limits, at 11.7 x 4.5 x 6.1 cm. However, it is diffusely echogenic,  which may reflect chronic underlying medical renal disease. There is no  hydronephrosis.     Impression:    1. There is no intra or extrahepatic biliary dilatation.  2. The patient's contracted gallbladder is filled with stones. There is  some gallbladder wall thickening, although this may be related to  incomplete distention. Pericholecystic fluid is also seen, another  nonspecific finding, which can be associated with etiologies other than  acute cholecystitis, including hepatitis and right-sided heart failure.  Correlation with clinical presentation is recommended. HIDA scan could  be considered for further assessment.  3. There is a somewhat echogenic appearance to the portal triads.  Appearance can be associated with hepatitis.  4. Sonographer measures the pancreatic duct and up to 4 mm. No obvious  obstructing lesion is seen. Patient's pancreas appeared atrophic on the  earlier CT. MRCP or ERCP could be considered for further assessment.     This report was finalized on 1/9/2022 10:51 PM by Dr. Cherri Rodriguez M.D.     CT Abdomen Pelvis Without Contrast  CT SCAN OF THE ABDOMEN AND PELVIS  WITHOUT CONTRAST     HISTORY: Right flank pain.     The CT scan was performed as an emergency procedure through the abdomen  and pelvis without contrast and compared to previous CT scans of the  abdomen and pelvis dated 12/02/2021. The following findings are present:  1. The kidneys are normal in size and there is dilatation of the left  renal collecting system and left ureter extending to the ureterovesical  junction that is similar to the previous exam. No obstructing stone is  identified. No right renal obstruction is seen. The bladder is somewhat  decompressed with an appearance of mild generalized wall thickening of  the bladder that is similar to the previous exam.  2. There is generalized anasarca with third spacing of fluid in the soft  tissues that is more prominent on today's exam. There is also slightly  more ascites present throughout the abdomen that extends into the pelvis  with haziness of the mesentery and clinical correlation is required.  3. There is a new small to moderate right pleural effusion and tiny left  pleural effusion and there is some adjacent dense atelectasis and  possible developing pneumonia at the right lung base.  4. The liver, spleen, pancreas, and both adrenal glands are  unremarkable. There are several gallstones within the gallbladder,  unchanged.  5. There is no aortic aneurysm. However, there are several enlarged  retroperitoneal lymph nodes measuring up to 2.5 cm which are similar to  the previous study. These are nonspecific but could relate to a  lymphoproliferative disorder or neoplasm and further clinical  correlation is required.  6. The large and small bowel loops are normal in caliber and show no  inflammatory change. The remainder of the pelvis is unremarkable.  7. At bone windows, no suspicious bone lesions are seen.                 Radiation dose reduction techniques were utilized, including automated  exposure control and exposure modulation based on body size.      This report was finalized on 1/9/2022 10:27 AM by Dr. Ronnie Ritchie M.D.       Results for orders placed during the hospital encounter of 11/29/21    Adult Transthoracic Echo Complete W/ Cont if Necessary Per Protocol    Interpretation Summary  · Calculated left ventricular EF = 61.8% Estimated left ventricular EF was in agreement with the calculated left ventricular EF. Left ventricular systolic function is normal.  · Left ventricular diastolic function is consistent with (grade II w/high LAP) pseudonormalization.  · The left atrial cavity is mildly dilated.  · Trace tricuspid valve regurgitation is present. Calculated right ventricular systolic pressure from tricuspid regurgitation is 21.4 mmHg.    Scheduled Medications  amLODIPine, 5 mg, Oral, Daily  [MAR Hold] aspirin, 81 mg, Oral, Daily  [MAR Hold] folic acid, 1 mg, Oral, Daily  [MAR Hold] furosemide, 40 mg, Oral, BID  [MAR Hold] insulin lispro, 0-9 Units, Subcutaneous, TID With Meals  [MAR Hold] insulin lispro, 3 Units, Subcutaneous, TID With Meals  [MAR Hold] levothyroxine, 125 mcg, Oral, Daily  metoprolol tartrate, 25 mg, Oral, BID  [MAR Hold] multivitamin with minerals, 1 tablet, Oral, Daily  [MAR Hold] OLANZapine, 5 mg, Oral, BID  [MAR Hold] pantoprazole, 40 mg, Oral, Daily  piperacillin-tazobactam, 3.375 g, Intravenous, Q12H  [MAR Hold] rOPINIRole, 0.5 mg, Oral, Q12H  [MAR Hold] sertraline, 100 mg, Oral, Daily  sodium chloride, 3 mL, Intravenous, Q12H  [MAR Hold] tamsulosin, 0.4 mg, Oral, Daily    Infusions  sodium chloride, 9 mL/hr, Last Rate: 9 mL/hr (01/10/22 4245)    Diet  Diet Regular; Cardiac, Consistent Carbohydrate, Renal  NPO Diet       Assessment/Plan     Active Hospital Problems    Diagnosis  POA   • **Pyelonephritis [N12]  Yes   • Calculus of gallbladder without biliary obstruction [K80.20]  Yes   • Pleural effusion on right [J90]  Yes   • Acute on chronic diastolic CHF (congestive heart failure) (HCC) [I50.33]  Yes   • ESRD (end stage  renal disease) ([facility]) [N18.6]  Yes   • CAD (coronary artery disease) [I25.10]  Yes   • HTN (hypertension) [I10]  Yes   • Type 2 diabetes mellitus with hyperglycemia, with long-term current use of insulin ([facility]) [E11.65, Z79.4]  Not Applicable   • Rheumatoid arthritis ([facility]) [M06.9]  Yes      Resolved Hospital Problems   No resolved problems to display.       56 y.o. female admitted with Pyelonephritis.    Continue Zosyn for possible intra-abdominal infection and enterococcal UTI.  General surgery to see patient for possible cholecystitis.  She will get hemodialysis today and tomorrow for treatment of hypervolemia.  Continue current pain control efforts.  Monitor blood glucose.  Follow hemoglobin.      · SCDs for DVT prophylaxis.  · Full code.  · Discussed with patient.  · Anticipate discharge to SNU facility timing yet to be determined.      Fran Yo MD  [facility]  01/10/22  18:18 EST

## 2022-01-10 NOTE — ANESTHESIA PREPROCEDURE EVALUATION
" Anesthesia Evaluation     Patient summary reviewed and Nursing notes reviewed   no history of anesthetic complications:  NPO Solid Status: > 8 hours  NPO Liquid Status: > 2 hours           Airway   Mallampati: III  TM distance: >3 FB  Neck ROM: full  No difficulty expected  Dental - normal exam     Pulmonary - normal exam   (+) pleural effusion (right sided),   Cardiovascular - normal exam  Exercise tolerance: poor (<4 METS)    ECG reviewed  Patient on routine beta blocker and Beta blocker given within 24 hours of surgery    (+) hypertension, past MI (2/2 demand ischemia) , CAD, CHF (grade II diastolic dysfunction) Diastolic >=55%,       Neuro/Psych  GI/Hepatic/Renal/Endo    (+)  GERD well controlled,  renal disease (dialysis right before surgery) ESRD and dialysis, diabetes mellitus (A1c 9.78) type 2 poorly controlled using insulin,     Musculoskeletal     Abdominal    Substance History      OB/GYN          Other   arthritis (Rhematoid arthritis), blood dyscrasia anemia,                   Anesthesia Plan    ASA 3     general   (I have reviewed the patient's history and chart with the patient, including all pertinent laboratory results and imaging. I have explained the risks of anesthesia including but not limited to dental damage, corneal abrasion, nerve injury, MI, stroke, aspiration, and death. Patient has agreed to proceed.     /77 (BP Location: Left arm, Patient Position: Lying)   Pulse 56   Temp 36.6 °C (97.9 °F) (Oral)   Resp 16   Ht 157.5 cm (62\")   Wt 82.3 kg (181 lb 8 oz)   SpO2 100%   Breastfeeding No   BMI 33.20 kg/m²   )  intravenous induction     Anesthetic plan, all risks, benefits, and alternatives have been provided, discussed and informed consent has been obtained with: patient.    Plan discussed with CRNA.      "

## 2022-01-10 NOTE — PROGRESS NOTES
NEPHROLOGY PROGRESS NOTE    PATIENT IDENTIFICATION:   Name:  Zaina Martinez      MRN:  [id number]     56 y.o.  female             Reason for visit:ESRD    SUBJECTIVE:     Seen and examined.  Currently on 2 L nasal cannula and satting above 91%.  Laying down in bed.    OBJECTIVE:  Vitals:    01/09/22 1900 01/09/22 2038 01/09/22 2243 01/10/22 0700   BP:  178/84 156/71    BP Location:  Left arm Left arm    Patient Position:  Lying Lying    Pulse:       Resp: 16 16 14    Temp:  98.4 °F (36.9 °C) 97.9 °F (36.6 °C)    TempSrc:  Oral Axillary    SpO2:       Weight:    82.3 kg (181 lb 8 oz)   Height:               Body mass index is 33.2 kg/m².    Intake/Output Summary (Last 24 hours) at 1/10/2022 0830  Last data filed at 1/9/2022 1158  Gross per 24 hour   Intake 100 ml   Output --   Net 100 ml         Exam:  GEN:  No distress, appears stated age  EYES:   Anicteric sclera  ENT:    External ears/nose normal, MM are moist  NECK:  No adenopathy, JVP none  LUNGS: Normal chest on inspection; not labored  CV:  Normal S1S2, without murmur  ABD:  Non-tender, non-distended, no hepatosplenomegaly, +BS  EXT:  No edema; no cyanosis; clubbing    Scheduled meds:  amLODIPine, 5 mg, Oral, Daily  aspirin, 81 mg, Oral, Daily  folic acid, 1 mg, Oral, Daily  furosemide, 40 mg, Oral, BID  insulin lispro, 0-9 Units, Subcutaneous, TID With Meals  insulin lispro, 3 Units, Subcutaneous, TID With Meals  levothyroxine, 125 mcg, Oral, Daily  metoprolol tartrate, 25 mg, Oral, BID  multivitamin with minerals, 1 tablet, Oral, Daily  OLANZapine, 5 mg, Oral, BID  pantoprazole, 40 mg, Oral, Daily  piperacillin-tazobactam, 3.375 g, Intravenous, Q12H  rOPINIRole, 0.5 mg, Oral, Q12H  sertraline, 100 mg, Oral, Daily  tamsulosin, 0.4 mg, Oral, Daily      IV meds:                      Pharmacy to Dose Zosyn,         Data Review:    Results from last 7 days   Lab Units 01/10/22  0626 01/09/22  0946   SODIUM mmol/L 132* 135*   POTASSIUM mmol/L 4.7 4.6   CHLORIDE  mmol/L 96* 96*   CO2 mmol/L 21.3* 22.7   BUN mg/dL 51* 45*   CREATININE mg/dL 4.03* 3.57*   CALCIUM mg/dL 8.1* 8.2*   BILIRUBIN mg/dL 3.8* 5.6*   ALK PHOS U/L 811* 1,021*   ALT (SGPT) U/L 35* 46*   AST (SGOT) U/L 47* 77*   GLUCOSE mg/dL 196* 217*       Estimated Creatinine Clearance: 15.5 mL/min (A) (by C-G formula based on SCr of 4.03 mg/dL (H)).          Results from last 7 days   Lab Units 01/10/22  0626 01/09/22  0833   WBC 10*3/mm3 10.91* 11.39*   HEMOGLOBIN g/dL 8.2* 9.2*   PLATELETS 10*3/mm3 233 244                   ASSESSMENT:     Pyelonephritis    HTN (hypertension)    Type 2 diabetes mellitus with hyperglycemia, with long-term current use of insulin (HCC)    Rheumatoid arthritis (HCC)    ESRD (end stage renal disease) (HCC)    CAD (coronary artery disease)    Acute on chronic diastolic CHF (congestive heart failure) (HCC)    Calculus of gallbladder without biliary obstruction    Pleural effusion on right      ESRD-  Normal dialysis at [facility].  TDC for access.   Discussed with her and  importance of staying on dialysis full treatment.  Volume Overload-  continue to diurese and UF with dialysis Discussed fluid restriction to prevent large intra dialytic weight gain.  Anemia-  Monitor, not far from goal.  Should improve with volume control.  HTN-  UF as tolarted.  Flank Pain-  No evidence of stone on CT.    RLS-  Will adjust requip to bid to see if this helps on dialysis.  DMII-  Poor control previously.    PLAN:    Plan for hemodialysis today and then tomorrow for more fluid removal  Continue to diurese as tolerated  Surveillance labs      Ruthann Palmer MD  1/10/2022    08:30 EST

## 2022-01-10 NOTE — CONSULTS
Cc: Abdominal pain    History of presenting illness:   This is a nice moderately obese 56-year-old female with a history of end-stage renal disease and type 2 diabetes, coronary artery disease who presents with about 3 to 4 days of abdominal pain.  The pain is on the right side, it started in her flank and she initially thought it was a kidney stone.  The pain is unrelenting.  It does not seem to be worse with movement.  Narcotics have made some mild improvements.  She noticed that her urine was a bit darker and because of this persistent pain presented to the emergency department where she was found to have some elevated liver function studies and total bilirubin.  There is some associated nausea.    Past Medical History: End-stage renal disease on chronic hemodialysis, type 2 diabetes, hypertension, coronary artery disease, congestive heart failure, gastroesophageal reflux disease, rheumatoid arthritis    Past Surgical History: Significant for hysterectomy and patient reports what sounds like a possible right inguinal hernia repair as well.  Insertion of hemodialysis catheter done approximately 1 month ago.    Medications: Reviewed and reconciled in epic.    Allergies: None known    Social History: Non-smoker    Family History: Gallbladder disease in her daughter    Review of Systems:  Constitutional: Positive for weakness, decreased appetite, negative for fever  Neck: no swollen glands or dysphagia or odynophagia  Respiratory: negative for SOB, cough, hemoptysis or wheezing  Cardiovascular: negative for chest pain, palpitations or peripheral edema  Gastrointestinal: Positive for abdominal pain, nausea, bloating      Physical Exam:  BMI: 33.2  Temperature 98.1 heart rate 52 blood pressure 182/90  General: alert and oriented, appropriate, no acute distress  Eyes: Mild scleral icterus is noted, extraocular movements are intact  Neck: Supple without lymphadenopathy or thyromegaly, trachea is in the  midline  Respiratory: There is good bilateral chest expansion, no use of accessory muscles is noted  Cardiovascular: No jugular venous distention or peripheral edema is seen  Gastrointestinal: Soft, there is tenderness, more prominent in the right lower quadrant and right upper quadrant, mild guarding, no hernia is felt    Laboratory data: Labs are reviewed.  White count has improved since admission to 10.9 from about 12 at admission.  Hemoglobin is 8.2.  Albumin 2.6.  Alkaline phosphatase mildly improved at 811 (greater than 1000 at admission, total bilirubin 3.8 (5.6 at admission, ALT and AST 35/47.    Imaging data: CT imaging reviewed by me and demonstrates at least one large stone in the gallbladder.  Noncontrasted study does not demonstrate any obvious evidence of acute appendicitis.  There may be some pericholecystic fluid as well as a small amount of ascites as well.  Gallbladder ultrasound does not demonstrate any biliary ductal dilatation.  Gallbladder is filled with stones.  There may be some wall thickening.  Pericholecystic fluid is seen as well.      Assessment and plan:   -Right-sided abdominal pain and elevated liver function studies in the setting of cholelithiasis  -Clinical scenario most consistent with that of sustained biliary colic versus acute cholecystitis.  No clear evidence of choledocholithiasis at this time.  Difficult to rule out acute appendicitis, but seems to be less likely.  -Options discussed with patient.  I have recommended proceeding with laparoscopic cholecystectomy and cholangiogram.  -End-stage renal disease on dialysis, typically Tuesday Thursday Saturday, renal is following  -Anemia, chronic, would defer endoscopic work-up to GI.    Risks associated with the procedure are noted to include, but not be limited to, bleeding, infection, injury to intra-abdominal structures including the liver, small and large intestine, stomach, duodenum, common bile duct and major vascular  structures.  Possible need for conversion to open surgery, further revisional surgery, postoperative ERCP discussed.      Ramana Raygoza MD, FACS  General, Minimally Invasive and Endoscopic Surgery  [facility]    [address], [address]  [place], [place], [address]  P: [phone]  F: [phone]

## 2022-01-11 PROBLEM — D63.1 ANEMIA DUE TO CHRONIC KIDNEY DISEASE, ON CHRONIC DIALYSIS: Status: ACTIVE | Noted: 2022-01-11

## 2022-01-11 PROBLEM — K80.12 CALCULUS OF GALLBLADDER WITH ACUTE ON CHRONIC CHOLECYSTITIS WITHOUT OBSTRUCTION: Status: ACTIVE | Noted: 2022-01-09

## 2022-01-11 PROBLEM — Z99.2 ANEMIA DUE TO CHRONIC KIDNEY DISEASE, ON CHRONIC DIALYSIS: Status: ACTIVE | Noted: 2022-01-11

## 2022-01-11 PROBLEM — N18.6 ANEMIA DUE TO CHRONIC KIDNEY DISEASE, ON CHRONIC DIALYSIS: Status: ACTIVE | Noted: 2022-01-11

## 2022-01-11 LAB
ABO GROUP BLD: NORMAL
ALBUMIN SERPL-MCNC: 2.3 G/DL (ref 3.5–5.2)
ALBUMIN/GLOB SERPL: 0.8 G/DL
ALP SERPL-CCNC: 608 U/L (ref 39–117)
ALT SERPL W P-5'-P-CCNC: 41 U/L (ref 1–33)
ANION GAP SERPL CALCULATED.3IONS-SCNC: 16.5 MMOL/L (ref 5–15)
AST SERPL-CCNC: 86 U/L (ref 1–32)
BACTERIA SPEC AEROBE CULT: ABNORMAL
BILIRUB SERPL-MCNC: 2.5 MG/DL (ref 0–1.2)
BLD GP AB SCN SERPL QL: NEGATIVE
BUN SERPL-MCNC: 28 MG/DL (ref 6–20)
BUN/CREAT SERPL: 9.3 (ref 7–25)
CALCIUM SPEC-SCNC: 7.6 MG/DL (ref 8.6–10.5)
CHLORIDE SERPL-SCNC: 99 MMOL/L (ref 98–107)
CO2 SERPL-SCNC: 18.5 MMOL/L (ref 22–29)
CREAT SERPL-MCNC: 3.02 MG/DL (ref 0.57–1)
DEPRECATED RDW RBC AUTO: 58 FL (ref 37–54)
ERYTHROCYTE [DISTWIDTH] IN BLOOD BY AUTOMATED COUNT: 19 % (ref 12.3–15.4)
GFR SERPL CREATININE-BSD FRML MDRD: 16 ML/MIN/1.73
GLOBULIN UR ELPH-MCNC: 3 GM/DL
GLUCOSE BLDC GLUCOMTR-MCNC: 147 MG/DL (ref 70–130)
GLUCOSE BLDC GLUCOMTR-MCNC: 154 MG/DL (ref 70–130)
GLUCOSE BLDC GLUCOMTR-MCNC: 165 MG/DL (ref 70–130)
GLUCOSE SERPL-MCNC: 160 MG/DL (ref 65–99)
HCT VFR BLD AUTO: 19.1 % (ref 34–46.6)
HGB BLD-MCNC: 6.1 G/DL (ref 12–15.9)
MCH RBC QN AUTO: 27.6 PG (ref 26.6–33)
MCHC RBC AUTO-ENTMCNC: 31.9 G/DL (ref 31.5–35.7)
MCV RBC AUTO: 86.4 FL (ref 79–97)
PLATELET # BLD AUTO: 264 10*3/MM3 (ref 140–450)
PMV BLD AUTO: 11.8 FL (ref 6–12)
POTASSIUM SERPL-SCNC: 4.4 MMOL/L (ref 3.5–5.2)
PROT SERPL-MCNC: 5.3 G/DL (ref 6–8.5)
RBC # BLD AUTO: 2.21 10*6/MM3 (ref 3.77–5.28)
RH BLD: POSITIVE
SODIUM SERPL-SCNC: 134 MMOL/L (ref 136–145)
T&S EXPIRATION DATE: NORMAL
WBC NRBC COR # BLD: 16.52 10*3/MM3 (ref 3.4–10.8)

## 2022-01-11 PROCEDURE — 82962 GLUCOSE BLOOD TEST: CPT

## 2022-01-11 PROCEDURE — 63710000001 ONDANSETRON PER 8 MG: Performed by: SURGERY

## 2022-01-11 PROCEDURE — 25010000002 PIPERACILLIN SOD-TAZOBACTAM PER 1 G: Performed by: HOSPITALIST

## 2022-01-11 PROCEDURE — 63710000001 INSULIN LISPRO (HUMAN) PER 5 UNITS: Performed by: SURGERY

## 2022-01-11 PROCEDURE — 99232 SBSQ HOSP IP/OBS MODERATE 35: CPT | Performed by: INTERNAL MEDICINE

## 2022-01-11 PROCEDURE — 99024 POSTOP FOLLOW-UP VISIT: CPT | Performed by: SURGERY

## 2022-01-11 PROCEDURE — 86850 RBC ANTIBODY SCREEN: CPT | Performed by: SURGERY

## 2022-01-11 PROCEDURE — P9016 RBC LEUKOCYTES REDUCED: HCPCS

## 2022-01-11 PROCEDURE — 25010000002 MORPHINE PER 10 MG: Performed by: NURSE PRACTITIONER

## 2022-01-11 PROCEDURE — 25010000002 HEPARIN (PORCINE) PER 1000 UNITS: Performed by: SURGERY

## 2022-01-11 PROCEDURE — 85027 COMPLETE CBC AUTOMATED: CPT | Performed by: SURGERY

## 2022-01-11 PROCEDURE — 86901 BLOOD TYPING SEROLOGIC RH(D): CPT | Performed by: SURGERY

## 2022-01-11 PROCEDURE — 80053 COMPREHEN METABOLIC PANEL: CPT | Performed by: SURGERY

## 2022-01-11 PROCEDURE — 25010000002 ONDANSETRON PER 1 MG: Performed by: SURGERY

## 2022-01-11 PROCEDURE — 86900 BLOOD TYPING SEROLOGIC ABO: CPT | Performed by: SURGERY

## 2022-01-11 PROCEDURE — 86900 BLOOD TYPING SEROLOGIC ABO: CPT

## 2022-01-11 PROCEDURE — 86923 COMPATIBILITY TEST ELECTRIC: CPT

## 2022-01-11 PROCEDURE — 25010000002 PIPERACILLIN SOD-TAZOBACTAM PER 1 G: Performed by: SURGERY

## 2022-01-11 RX ORDER — MORPHINE SULFATE 2 MG/ML
2 INJECTION, SOLUTION INTRAMUSCULAR; INTRAVENOUS EVERY 4 HOURS PRN
Status: DISCONTINUED | OUTPATIENT
Start: 2022-01-11 | End: 2022-01-11

## 2022-01-11 RX ORDER — HYDROCODONE BITARTRATE AND ACETAMINOPHEN 5; 325 MG/1; MG/1
2 TABLET ORAL EVERY 4 HOURS PRN
Status: DISCONTINUED | OUTPATIENT
Start: 2022-01-11 | End: 2022-01-12

## 2022-01-11 RX ADMIN — MORPHINE SULFATE 2 MG: 2 INJECTION, SOLUTION INTRAMUSCULAR; INTRAVENOUS at 03:09

## 2022-01-11 RX ADMIN — FUROSEMIDE 40 MG: 40 TABLET ORAL at 08:09

## 2022-01-11 RX ADMIN — OLANZAPINE 5 MG: 5 TABLET ORAL at 08:20

## 2022-01-11 RX ADMIN — ROPINIROLE HYDROCHLORIDE 0.5 MG: 0.5 TABLET, FILM COATED ORAL at 08:21

## 2022-01-11 RX ADMIN — MORPHINE SULFATE 2 MG: 2 INJECTION, SOLUTION INTRAMUSCULAR; INTRAVENOUS at 12:16

## 2022-01-11 RX ADMIN — MORPHINE SULFATE 2 MG: 2 INJECTION, SOLUTION INTRAMUSCULAR; INTRAVENOUS at 08:09

## 2022-01-11 RX ADMIN — METOPROLOL TARTRATE 25 MG: 25 TABLET, FILM COATED ORAL at 08:09

## 2022-01-11 RX ADMIN — HYDROCODONE BITARTRATE AND ACETAMINOPHEN 2 TABLET: 5; 325 TABLET ORAL at 15:03

## 2022-01-11 RX ADMIN — INSULIN LISPRO 2 UNITS: 100 INJECTION, SOLUTION INTRAVENOUS; SUBCUTANEOUS at 22:37

## 2022-01-11 RX ADMIN — TAZOBACTAM SODIUM AND PIPERACILLIN SODIUM 3.38 G: 375; 3 INJECTION, SOLUTION INTRAVENOUS at 20:43

## 2022-01-11 RX ADMIN — HYDROCODONE BITARTRATE AND ACETAMINOPHEN 1 TABLET: 5; 325 TABLET ORAL at 01:52

## 2022-01-11 RX ADMIN — HYDROCODONE BITARTRATE AND ACETAMINOPHEN 2 TABLET: 5; 325 TABLET ORAL at 20:56

## 2022-01-11 RX ADMIN — PANTOPRAZOLE SODIUM 40 MG: 40 TABLET, DELAYED RELEASE ORAL at 08:09

## 2022-01-11 RX ADMIN — ONDANSETRON HYDROCHLORIDE 4 MG: 4 TABLET, FILM COATED ORAL at 03:10

## 2022-01-11 RX ADMIN — ASPIRIN 81 MG: 81 TABLET, COATED ORAL at 08:09

## 2022-01-11 RX ADMIN — LEVOTHYROXINE SODIUM 125 MCG: 0.12 TABLET ORAL at 08:21

## 2022-01-11 RX ADMIN — ONDANSETRON 4 MG: 2 INJECTION INTRAMUSCULAR; INTRAVENOUS at 09:19

## 2022-01-11 RX ADMIN — FOLIC ACID 1 MG: 1 TABLET ORAL at 08:21

## 2022-01-11 RX ADMIN — FUROSEMIDE 40 MG: 40 TABLET ORAL at 20:43

## 2022-01-11 RX ADMIN — TAZOBACTAM SODIUM AND PIPERACILLIN SODIUM 3.38 G: 375; 3 INJECTION, SOLUTION INTRAVENOUS at 03:20

## 2022-01-11 RX ADMIN — INSULIN LISPRO 3 UNITS: 100 INJECTION, SOLUTION INTRAVENOUS; SUBCUTANEOUS at 08:08

## 2022-01-11 RX ADMIN — INSULIN LISPRO 3 UNITS: 100 INJECTION, SOLUTION INTRAVENOUS; SUBCUTANEOUS at 12:16

## 2022-01-11 RX ADMIN — INSULIN LISPRO 2 UNITS: 100 INJECTION, SOLUTION INTRAVENOUS; SUBCUTANEOUS at 08:08

## 2022-01-11 RX ADMIN — HYDROCODONE BITARTRATE AND ACETAMINOPHEN 1 TABLET: 5; 325 TABLET ORAL at 10:02

## 2022-01-11 RX ADMIN — AMLODIPINE BESYLATE 5 MG: 5 TABLET ORAL at 08:10

## 2022-01-11 RX ADMIN — OLANZAPINE 5 MG: 5 TABLET ORAL at 20:43

## 2022-01-11 RX ADMIN — SERTRALINE 100 MG: 100 TABLET, FILM COATED ORAL at 08:21

## 2022-01-11 RX ADMIN — TAMSULOSIN HYDROCHLORIDE 0.4 MG: 0.4 CAPSULE ORAL at 08:21

## 2022-01-11 RX ADMIN — HEPARIN SODIUM 4000 UNITS: 1000 INJECTION INTRAVENOUS; SUBCUTANEOUS at 18:49

## 2022-01-11 RX ADMIN — ROPINIROLE HYDROCHLORIDE 0.5 MG: 0.5 TABLET, FILM COATED ORAL at 20:43

## 2022-01-11 RX ADMIN — METOPROLOL TARTRATE 25 MG: 25 TABLET, FILM COATED ORAL at 20:43

## 2022-01-11 RX ADMIN — MULTIPLE VITAMINS W/ MINERALS TAB 1 TABLET: TAB at 08:21

## 2022-01-11 RX ADMIN — HYDROCODONE BITARTRATE AND ACETAMINOPHEN 1 TABLET: 5; 325 TABLET ORAL at 05:58

## 2022-01-11 NOTE — PROGRESS NOTES
NEPHROLOGY PROGRESS NOTE    PATIENT IDENTIFICATION:   Name:  Zaina Martinez      MRN:  [id number]     56 y.o.  female             Reason for visit:ESRD    SUBJECTIVE:     Seen and examined.  Patient had removal of 4 gallbladder yesterday by Dr. Raygoza.  Resting comfortably.  Currently on 2 L of nasal cannula.  No shortness of air or chest pain.    OBJECTIVE:  Vitals:    01/10/22 2219 01/11/22 0500 01/11/22 0724 01/11/22 0809   BP: 151/69  128/58    BP Location: Right arm  Right arm    Patient Position: Lying  Lying    Pulse:   64 62   Resp: 16  16    Temp:   98.1 °F (36.7 °C)    TempSrc:   Oral    SpO2: 95%  94%    Weight:  86.3 kg (190 lb 4.1 oz)     Height:               Body mass index is 34.8 kg/m².    Intake/Output Summary (Last 24 hours) at 1/11/2022 0842  Last data filed at 1/11/2022 0800  Gross per 24 hour   Intake 236 ml   Output 4000 ml   Net -3764 ml         Exam:  GEN:  No distress, appears stated age  EYES:   Anicteric sclera  ENT:    External ears/nose normal, MM are moist  NECK:  No adenopathy, JVP none  LUNGS: Normal chest on inspection; not labored  CV:  Normal S1S2, without murmur  ABD:  Non-tender, non-distended, no hepatosplenomegaly, +BS  EXT:  No edema; no cyanosis; clubbing    Scheduled meds:  amLODIPine, 5 mg, Oral, Daily  aspirin, 81 mg, Oral, Daily  folic acid, 1 mg, Oral, Daily  furosemide, 40 mg, Oral, BID  insulin lispro, 0-9 Units, Subcutaneous, TID With Meals  insulin lispro, 3 Units, Subcutaneous, TID With Meals  levothyroxine, 125 mcg, Oral, Daily  metoprolol tartrate, 25 mg, Oral, BID  multivitamin with minerals, 1 tablet, Oral, Daily  OLANZapine, 5 mg, Oral, BID  pantoprazole, 40 mg, Oral, Daily  piperacillin-tazobactam, 3.375 g, Intravenous, Q12H  rOPINIRole, 0.5 mg, Oral, Q12H  sertraline, 100 mg, Oral, Daily  tamsulosin, 0.4 mg, Oral, Daily      IV meds:                           Data Review:    Results from last 7 days   Lab Units 01/10/22  0626 01/09/22  0946   SODIUM mmol/L  132* 135*   POTASSIUM mmol/L 4.7 4.6   CHLORIDE mmol/L 96* 96*   CO2 mmol/L 21.3* 22.7   BUN mg/dL 51* 45*   CREATININE mg/dL 4.03* 3.57*   CALCIUM mg/dL 8.1* 8.2*   BILIRUBIN mg/dL 3.8* 5.6*   ALK PHOS U/L 811* 1,021*   ALT (SGPT) U/L 35* 46*   AST (SGOT) U/L 47* 77*   GLUCOSE mg/dL 196* 217*       Estimated Creatinine Clearance: 15.9 mL/min (A) (by C-G formula based on SCr of 4.03 mg/dL (H)).          Results from last 7 days   Lab Units 01/10/22  0626 01/09/22  0833   WBC 10*3/mm3 10.91* 11.39*   HEMOGLOBIN g/dL 8.2* 9.2*   PLATELETS 10*3/mm3 233 244                   ASSESSMENT:     Pyelonephritis    HTN (hypertension)    Type 2 diabetes mellitus with hyperglycemia, with long-term current use of insulin (HCC)    Rheumatoid arthritis (HCC)    ESRD (end stage renal disease) (HCC)    CAD (coronary artery disease)    Acute on chronic diastolic CHF (congestive heart failure) (HCC)    Calculus of gallbladder without biliary obstruction    Pleural effusion on right      ESRD-  Normal dialysis at [facility].  TDC for access.   Discussed with her and  importance of staying on dialysis full treatment.  Volume Overload-  continue to diurese and UF with dialysis Discussed fluid restriction to prevent large intra dialytic weight gain.  Anemia-  Monitor, not far from goal.  Should improve with volume control.  HTN-  UF as tolarted.  Flank Pain-  No evidence of stone on CT.    RLS-  Will adjust requip to bid to see if this helps on dialysis.  DMII-  Poor control previously.    PLAN:    Hemodialysis again today  Surveillance labs      Ruthann Palmer MD  1/11/2022    08:42 EST

## 2022-01-11 NOTE — ANESTHESIA POSTPROCEDURE EVALUATION
Patient: Zaina Martinez    Procedure Summary     Date: 01/10/22 Room / Location: [facility] OR [address] [place] [address] MAIN OR    Anesthesia Start: 1735 Anesthesia Stop: 1929    Procedure: Laparoscopic cholecystectomy with intraoperative cholangiogram (N/A Abdomen) Diagnosis:       Calculus of gallbladder with acute on chronic cholecystitis without obstruction      (Calculus of gallbladder with acute on chronic cholecystitis without obstruction [K80.12])    Surgeons: Ramana Raygoza MD Provider: Essie Mcgarry MD    Anesthesia Type: general ASA Status: 3          Anesthesia Type: general    Vitals  Vitals Value Taken Time   /68 01/10/22 2006   Temp 36.5 °C (97.7 °F) 01/10/22 1925   Pulse 53 01/10/22 2010   Resp 14 01/10/22 2000   SpO2 97 % 01/10/22 2010   Vitals shown include unvalidated device data.        Post Anesthesia Care and Evaluation    Anesthetic complications: No anesthetic complications

## 2022-01-11 NOTE — PLAN OF CARE
Goal Outcome Evaluation:           Progress: improving  Outcome Summary: Patient is alert and oriented, SATs in low 90s on 2L NC, SR on monitor. Patient had cholecystectomy yesterday evening. Returned to floor at approximatly 2200. Patient was asleep but easily arousable. Vitals have remained stable throughout shift. Patient began to experience break through pain at approximatly 0245, morphin was ordered and administered as per order. Patient is resting comfortably. Will continue to monitor.

## 2022-01-11 NOTE — PROGRESS NOTES
"    Name: Zaina Martinez ADMIT: 2022   : 1965  PCP: Karson Oreilly MD    MRN: [id number] LOS: 2 days   AGE/SEX: 56 y.o. female  ROOM: [facility]     Subjective   Subjective    S/p fannie yesterday.  Seems sedated today after receiving morphine.  Says abdominal pain still \"severe\"      Review of Systems     Objective   Objective   Vital Signs  Temp:  [97.7 °F (36.5 °C)-98.2 °F (36.8 °C)] 98.1 °F (36.7 °C)  Heart Rate:  [48-64] 62  Resp:  [14-16] 16  BP: (116-182)/(55-90) 128/58  SpO2:  [94 %-100 %] 94 %  on  Flow (L/min):  [2-4] 2;   Device (Oxygen Therapy): nasal cannula  Body mass index is 34.8 kg/m².    Intake/Output Summary (Last 24 hours) at 2022 1114  Last data filed at 2022 0800  Gross per 24 hour   Intake 236 ml   Output 4000 ml   Net -3764 ml   Net IO Since Admission: -3,664 mL [22 1114]   Wt Readings from Last 1 Encounters:   22 0500 86.3 kg (190 lb 4.1 oz)   01/10/22 0700 82.3 kg (181 lb 8 oz)   22 1808 77.6 kg (171 lb)   22 1006 79.4 kg (175 lb)     Wt Readings from Last 3 Encounters:   22 86.3 kg (190 lb 4.1 oz)   21 71.8 kg (158 lb 3.2 oz)   21 69.9 kg (154 lb)       Physical Exam  Vitals and nursing note reviewed.   Constitutional:       General: She is not in acute distress.     Appearance: She is ill-appearing.   Neck:      Vascular: No JVD.      Trachea: No tracheal deviation.   Cardiovascular:      Rate and Rhythm: Normal rate and regular rhythm.      Heart sounds: Normal heart sounds.   Pulmonary:      Effort: Pulmonary effort is normal. No respiratory distress.      Breath sounds: Normal breath sounds.   Abdominal:      General: Bowel sounds are normal.      Palpations: Abdomen is soft.      Tenderness: There is no abdominal tenderness.   Musculoskeletal:      Right lower leg: Edema present.      Left lower leg: Edema present.   Skin:     General: Skin is warm and dry.      Coloration: Skin is pale.   Neurological:      General: No focal " deficit present.      Mental Status: She is alert and oriented to person, place, and time.   Psychiatric:         Attention and Perception: She is inattentive.         Cognition and Memory: Cognition is impaired.         Results Review     I reviewed the patient's new clinical results.  Results from last 7 days   Lab Units 01/11/22  0746 01/10/22  0626 01/09/22  0833   WBC 10*3/mm3 16.52* 10.91* 11.39*   HEMOGLOBIN g/dL 6.1* 8.2* 9.2*   PLATELETS 10*3/mm3 264 233 244     Results from last 7 days   Lab Units 01/11/22  0746 01/10/22  0626 01/09/22  0946   SODIUM mmol/L 134* 132* 135*   POTASSIUM mmol/L 4.4 4.7 4.6   CHLORIDE mmol/L 99 96* 96*   CO2 mmol/L 18.5* 21.3* 22.7   BUN mg/dL 28* 51* 45*   CREATININE mg/dL 3.02* 4.03* 3.57*   GLUCOSE mg/dL 160* 196* 217*   EGFR IF NONAFRICN AM mL/min/1.73 16* 11* 13*   ALBUMIN g/dL 2.30* 2.60* 2.90*   BILIRUBIN mg/dL 2.5* 3.8* 5.6*   ALK PHOS U/L 608* 811* 1,021*   AST (SGOT) U/L 86* 47* 77*   ALT (SGPT) U/L 41* 35* 46*     Results from last 7 days   Lab Units 01/11/22  0746 01/10/22  0626 01/09/22  0946   CALCIUM mg/dL 7.6* 8.1* 8.2*   ALBUMIN g/dL 2.30* 2.60* 2.90*     Hemoglobin A1C   Date/Time Value Ref Range Status   01/10/2022 0626 6.90 (H) 4.80 - 5.60 % Final     Glucose   Date/Time Value Ref Range Status   01/11/2022 1054 147 (H) 70 - 130 mg/dL Final     Comment:     Meter: [id number] : [id number] Domo Crawford NA   01/11/2022 0618 154 (H) 70 - 130 mg/dL Final     Comment:     Meter: [id number] : [id number] Jay Turk NA   01/10/2022 1928 119 70 - 130 mg/dL Final     Comment:     Meter: [id number] : [id number] Gareth BAL RN   01/10/2022 1654 115 70 - 130 mg/dL Final     Comment:     Meter: [id number] : [id number] Valeri Rodriguez RN   01/10/2022 1116 158 (H) 70 - 130 mg/dL Final     Comment:     Meter: [id number] : [id number] Randall Woo NA   01/10/2022 0625 195 (H) 70 - 130 mg/dL Final     Comment:     Meter: [id number] :  [id number] [facility]   01/09/2022 2056 233 (H) 70 - 130 mg/dL Final     Comment:     RN Notified R and V Meter: [id number] : [id number] Sree Singh QUENTIN     FL Cholangiogram Operative  Narrative: FL CHOLANGIOGRAM OPERATIVE-     INDICATIONS: Operative cholangiography     TECHNIQUE: FLUOROSCOPIC ASSISTANCE IN THE OPERATING ROOM.     FINDINGS:     154 intraoperative fluoroscopic spot views were obtained and recorded  the PACS for review, revealing opacification of cystic duct remnant,  extrahepatic and central intrahepatic biliary ducts, and a portion of  the duodenum. No biliary obstruction or definite persistent filling  defect. Please see operative report for full details.     Fluoroscopy time: 24.5 seconds     Impression:    As described.     This report was finalized on 1/10/2022 7:44 PM by Dr. Jared Kern M.D.         Scheduled Medications  amLODIPine, 5 mg, Oral, Daily  aspirin, 81 mg, Oral, Daily  folic acid, 1 mg, Oral, Daily  furosemide, 40 mg, Oral, BID  insulin lispro, 0-9 Units, Subcutaneous, TID With Meals  insulin lispro, 3 Units, Subcutaneous, TID With Meals  levothyroxine, 125 mcg, Oral, Daily  metoprolol tartrate, 25 mg, Oral, BID  multivitamin with minerals, 1 tablet, Oral, Daily  OLANZapine, 5 mg, Oral, BID  pantoprazole, 40 mg, Oral, Daily  piperacillin-tazobactam, 3.375 g, Intravenous, Q12H  rOPINIRole, 0.5 mg, Oral, Q12H  sertraline, 100 mg, Oral, Daily  tamsulosin, 0.4 mg, Oral, Daily    Infusions   Diet  Diet Regular; GI Soft, Consistent Carbohydrate       Assessment/Plan     Active Hospital Problems    Diagnosis  POA   • **Calculus of gallbladder with acute on chronic cholecystitis without obstruction [K80.12]  Yes   • Anemia due to chronic kidney disease, on chronic dialysis (HCC) [N18.6, D63.1, Z99.2]  Not Applicable   • Pyelonephritis [N12]  Yes   • Pleural effusion on right [J90]  Yes   • Acute on chronic diastolic CHF (congestive heart failure) ([facility]) [I50.33]  Yes    • ESRD (end stage renal disease) ([facility]) [N18.6]  Yes   • CAD (coronary artery disease) [I25.10]  Yes   • HTN (hypertension) [I10]  Yes   • Type 2 diabetes mellitus with hyperglycemia, with long-term current use of insulin ([facility]) [E11.65, Z79.4]  Not Applicable   • Rheumatoid arthritis ([facility]) [M06.9]  Yes      Resolved Hospital Problems   No resolved problems to display.       56 y.o. female admitted with Calculus of gallbladder with acute on chronic cholecystitis without obstruction.    She is s/p Laparoscopic cholecystectomy with intraoperative cholangiogram    After further assessment patient's right-sided abdominal pain on admission more likely due to cholecystitis than pyelonephritis.  She has undergone cholecystectomy with normal IOP.  Will monitor postoperative course per surgery.  Expected blood loss with surgery being treated with blood transfusion today.  She is receiving dialysis today for her continued hypervolemia.  She does have Enterococcus in her urine which may be colonized.  Given its presence and the cholecystitis on admission we will continue IV Zosyn for now.  Will hold Lovenox due to severe anemia.  I discussed my concern with patient about using IV morphine postop.  She is oversedated at time of my assessment.  Will dc morphine and continue norco but will change to 1 or 2 tabs every 4 hours as needed.        · SCDs for DVT prophylaxis.  · Full code.  · Discussed with patient and family.  · Anticipate discharge to SNU facility timing yet to be determined.      Fran Yo MD  [facility]  01/11/22  11:14 EST

## 2022-01-11 NOTE — PROGRESS NOTES
Postop day 1 cholecystectomy for acute cholecystitis    Subjective:  Still complains of pain although the character is different today. Denies much nausea.    Objective:  Afebrile, heart rate 60s blood pressure 128/58  General: Awake and alert, no distress  Eyes: Mild scleral icterus  Abdomen: Soft, objectively less tender today, dressings are clean    Labs are reviewed. White count elevated consistent with acute postop phase, hemoglobin has dipped from 8.2 yesterday morning to 6.1 this morning. LFTs are improving.    Assessment and plan:  -Acute cholecystitis, now day 1 after cholecystectomy, stable from that standpoint. Tolerating full liquids, may advance to solid food later today if tolerated.  -Anemia, worse today. She did lose some blood Intra-Op and had hemodialysis yesterday. Clinically does not appear to be actively bleeding at the moment. I have ordered 2 units for today. GI has recommended outpatient endoscopic evaluation of her chronic anemia.  -End-stage renal disease, on dialysis. Dialyzed yesterday with plans for dialysis again today.  -Mobilize as able  -Would recommend avoiding Lovenox for now    Ramana Raygoza MD  General and Endoscopic Surgery  [facility]    [address], [address]  [place], [place], [address]  P: [phone]  F: [phone]

## 2022-01-11 NOTE — PROGRESS NOTES
[facility], operated by [facility]  Inpatient Progress Note    Reason for Follow Up: Elevated liver tests, leary colic, chronic anemia    Subjective     Interval History:   Patient had general surgery evaluation yesterday and removal of her gallbladder with a normal IOC    Current Facility-Administered Medications:   •  acetaminophen (TYLENOL) tablet 650 mg, 650 mg, Oral, Q4H PRN, Ramana Raygoza MD  •  amLODIPine (NORVASC) tablet 5 mg, 5 mg, Oral, Daily, Ramana Raygoza MD, 5 mg at 01/09/22 1723  •  aspirin EC tablet 81 mg, 81 mg, Oral, Daily, Ramana Raygoza MD, 81 mg at 01/10/22 0807  •  dextrose (D50W) (25 g/50 mL) IV injection 25 g, 25 g, Intravenous, Q15 Min PRN, Ramana Raygoza MD  •  dextrose (GLUTOSE) oral gel 15 g, 15 g, Oral, Q15 Min PRN, Ramana Raygoza MD  •  folic acid (FOLVITE) tablet 1 mg, 1 mg, Oral, Daily, Ramana Raygoza MD, 1 mg at 01/10/22 0807  •  furosemide (LASIX) tablet 40 mg, 40 mg, Oral, BID, Ramana Raygoza MD, 40 mg at 01/10/22 0807  •  glucagon (human recombinant) (GLUCAGEN DIAGNOSTIC) injection 1 mg, 1 mg, Subcutaneous, Q15 Min PRN, Ramana Raygoza MD  •  heparin (porcine) injection 4,000 Units, 4,000 Units, Intracatheter, PRN, Ramana Raygoza MD, 4,000 Units at 01/10/22 1642  •  HYDROcodone-acetaminophen (NORCO) 5-325 MG per tablet 1 tablet, 1 tablet, Oral, Q4H PRN, Ramana Raygoza MD, 1 tablet at 01/11/22 0558  •  insulin lispro (ADMELOG) injection 0-9 Units, 0-9 Units, Subcutaneous, TID With Meals, Ramana Raygoza MD, 2 Units at 01/10/22 0802  •  insulin lispro (ADMELOG) injection 3 Units, 3 Units, Subcutaneous, TID With Meals, Ramana Raygoza MD, 3 Units at 01/10/22 0802  •  levothyroxine (SYNTHROID, LEVOTHROID) tablet 125 mcg, 125 mcg, Oral, Daily, Ramana Raygoza MD, 125 mcg at 01/10/22 0807  •  melatonin tablet 3 mg, 3 mg, Oral, Nightly PRN, Ramana Raygoza MD, 3 mg at 01/09/22 2014  •  metoprolol tartrate (LOPRESSOR) tablet 25 mg, 25  mg, Oral, BID, Ramana Raygoza MD, 25 mg at 01/09/22 2015  •  morphine injection 2 mg, 2 mg, Intravenous, Q4H PRN, Farida Laoby APRN, 2 mg at 01/11/22 0309  •  multivitamin with minerals 1 tablet, 1 tablet, Oral, Daily, Ramana Raygoza MD, 1 tablet at 01/10/22 0807  •  nitroglycerin (NITROSTAT) SL tablet 0.4 mg, 0.4 mg, Sublingual, Q5 Min PRN, Ramana Raygoza MD  •  OLANZapine (zyPREXA) tablet 5 mg, 5 mg, Oral, BID, Ramana Raygoza MD, 5 mg at 01/10/22 0807  •  ondansetron (ZOFRAN) tablet 4 mg, 4 mg, Oral, Q6H PRN, 4 mg at 01/11/22 0310 **OR** ondansetron (ZOFRAN) injection 4 mg, 4 mg, Intravenous, Q6H PRN, Ramana Raygoza MD  •  pantoprazole (PROTONIX) EC tablet 40 mg, 40 mg, Oral, Daily, Ramana Raygoza MD, 40 mg at 01/10/22 0807  •  piperacillin-tazobactam (ZOSYN) 3.375 g in iso-osmotic dextrose 50 ml (premix), 3.375 g, Intravenous, Q12H, Ramana Raygoza MD, 3.375 g at 01/11/22 0320  •  rOPINIRole (REQUIP) tablet 0.5 mg, 0.5 mg, Oral, Q12H, Ramana Raygoza MD, 0.5 mg at 01/10/22 0810  •  sertraline (ZOLOFT) tablet 100 mg, 100 mg, Oral, Daily, Ramana Raygoza MD, 100 mg at 01/10/22 0807  •  tamsulosin (FLOMAX) 24 hr capsule 0.4 mg, 0.4 mg, Oral, Daily, Ramana Raygoza MD, 0.4 mg at 01/10/22 0807  Review of Systems:    There is weakness of fatigue all other systems reviewed and negative    Objective     Vital Signs  Temp:  [97.7 °F (36.5 °C)-98.2 °F (36.8 °C)] 97.8 °F (36.6 °C)  Heart Rate:  [48-56] 53  Resp:  [14-18] 16  BP: (116-208)/(55-91) 151/69  Body mass index is 34.8 kg/m².    Intake/Output Summary (Last 24 hours) at 1/11/2022 0707  Last data filed at 1/10/2022 2147  Gross per 24 hour   Intake 0 ml   Output 4000 ml   Net -4000 ml     No intake/output data recorded.     Physical Exam:   General: patient awake, alert and cooperative   Eyes: Normal lids and lashes, no scleral icterus   Neck: supple, normal ROM   Skin: warm and dry, not jaundiced   Cardiovascular: regular  rhythm and rate, no murmurs auscultated   Pulm: clear to auscultation bilaterally, regular and unlabored   Abdomen: soft, nontender, nondistended; normal bowel sounds   Extremities: no rash or edema   Psychiatric: Normal mood and behavior; memory intact     Results Review:     I reviewed the patient's new clinical results.    Results from last 7 days   Lab Units 01/10/22  0626 01/09/22  0833   WBC 10*3/mm3 10.91* 11.39*   HEMOGLOBIN g/dL 8.2* 9.2*   HEMATOCRIT % 26.0* 28.3*   PLATELETS 10*3/mm3 233 244     Results from last 7 days   Lab Units 01/10/22  0626 01/09/22  0946   SODIUM mmol/L 132* 135*   POTASSIUM mmol/L 4.7 4.6   CHLORIDE mmol/L 96* 96*   CO2 mmol/L 21.3* 22.7   BUN mg/dL 51* 45*   CREATININE mg/dL 4.03* 3.57*   CALCIUM mg/dL 8.1* 8.2*   BILIRUBIN mg/dL 3.8* 5.6*   ALK PHOS U/L 811* 1,021*   ALT (SGPT) U/L 35* 46*   AST (SGOT) U/L 47* 77*   GLUCOSE mg/dL 196* 217*         Lab Results   Lab Value Date/Time    LIPASE 21 12/19/2021 1314       Radiology:  [place] Cholangiogram Operative   Final Result       As described.       This report was finalized on 1/10/2022 7:44 PM by Dr. Jared Kern M.D.          US Liver   Final Result       1. There is no intra or extrahepatic biliary dilatation.   2. The patient's contracted gallbladder is filled with stones. There is   some gallbladder wall thickening, although this may be related to   incomplete distention. Pericholecystic fluid is also seen, another   nonspecific finding, which can be associated with etiologies other than   acute cholecystitis, including hepatitis and right-sided heart failure.   Correlation with clinical presentation is recommended. HIDA scan could   be considered for further assessment.   3. There is a somewhat echogenic appearance to the portal triads.   Appearance can be associated with hepatitis.   4. Sonographer measures the pancreatic duct and up to 4 mm. No obvious   obstructing lesion is seen. Patient's pancreas appeared atrophic  on the   earlier CT. MRCP or ERCP could be considered for further assessment.       This report was finalized on 1/9/2022 10:51 PM by Dr. Cherri Rodriguez M.D.          CT Abdomen Pelvis Without Contrast   Final Result          Assessment/Plan     Patient Active Problem List   Diagnosis   • MAYUR (acute kidney injury) (HCC)   • HTN (hypertension)   • Hypothyroidism   • Type 2 diabetes mellitus with hyperglycemia, with long-term current use of insulin (HCC)   • Rheumatoid arthritis (HCC)   • Angioedema   • GERD (gastroesophageal reflux disease)   • Anemia   • Medically noncompliant   • Myocardial infarction due to demand ischemia (HCC)   • Enteritis   • PRES (posterior reversible encephalopathy syndrome)   • Urine retention   • Klebsiella infection   • Superficial thrombophlebitis   • Generalized weakness   • ESRD (end stage renal disease) (HCC)   • CAD (coronary artery disease)   • Abnormal urinalysis   • Acute on chronic diastolic CHF (congestive heart failure) (HCC)   • Pyelonephritis   • Calculus of gallbladder without biliary obstruction   • Pleural effusion on right       Assessment:   1.  Elevated liver enzymes, biliary colic  2.  Cholelithiasis  3.  R sided abdominal pain  4.  ESRD on HD     All problems new to me today     Plan:   Gallbladder has been removed, IOC without filling defects  A.m. labs pending  Postoperative care per general surgery  Chronic anemia will be worked up as an outpatient with scopes    I discussed the patients findings and my recommendations with patient and nursing staff.    Marv Gallagher MD

## 2022-01-12 ENCOUNTER — APPOINTMENT (OUTPATIENT)
Dept: GENERAL RADIOLOGY | Facility: HOSPITAL | Age: 57
End: 2022-01-12

## 2022-01-12 LAB
ALBUMIN SERPL-MCNC: 2.2 G/DL (ref 3.5–5.2)
ALBUMIN/GLOB SERPL: 0.8 G/DL
ALP SERPL-CCNC: 525 U/L (ref 39–117)
ALT SERPL W P-5'-P-CCNC: 35 U/L (ref 1–33)
ANION GAP SERPL CALCULATED.3IONS-SCNC: 14.6 MMOL/L (ref 5–15)
AST SERPL-CCNC: 56 U/L (ref 1–32)
BASOPHILS # BLD AUTO: 0.07 10*3/MM3 (ref 0–0.2)
BASOPHILS NFR BLD AUTO: 0.5 % (ref 0–1.5)
BH BB BLOOD EXPIRATION DATE: NORMAL
BH BB BLOOD EXPIRATION DATE: NORMAL
BH BB BLOOD TYPE BARCODE: 5100
BH BB BLOOD TYPE BARCODE: 5100
BH BB DISPENSE STATUS: NORMAL
BH BB DISPENSE STATUS: NORMAL
BH BB PRODUCT CODE: NORMAL
BH BB PRODUCT CODE: NORMAL
BH BB UNIT NUMBER: NORMAL
BH BB UNIT NUMBER: NORMAL
BILIRUB SERPL-MCNC: 1.6 MG/DL (ref 0–1.2)
BUN SERPL-MCNC: 20 MG/DL (ref 6–20)
BUN/CREAT SERPL: 7.9 (ref 7–25)
CALCIUM SPEC-SCNC: 7.3 MG/DL (ref 8.6–10.5)
CHLORIDE SERPL-SCNC: 98 MMOL/L (ref 98–107)
CO2 SERPL-SCNC: 20.4 MMOL/L (ref 22–29)
CREAT SERPL-MCNC: 2.53 MG/DL (ref 0.57–1)
CROSSMATCH INTERPRETATION: NORMAL
CROSSMATCH INTERPRETATION: NORMAL
DEPRECATED RDW RBC AUTO: 52.2 FL (ref 37–54)
EOSINOPHIL # BLD AUTO: 0.18 10*3/MM3 (ref 0–0.4)
EOSINOPHIL NFR BLD AUTO: 1.3 % (ref 0.3–6.2)
ERYTHROCYTE [DISTWIDTH] IN BLOOD BY AUTOMATED COUNT: 16 % (ref 12.3–15.4)
GFR SERPL CREATININE-BSD FRML MDRD: 20 ML/MIN/1.73
GLOBULIN UR ELPH-MCNC: 2.9 GM/DL
GLUCOSE BLDC GLUCOMTR-MCNC: 180 MG/DL (ref 70–130)
GLUCOSE BLDC GLUCOMTR-MCNC: 206 MG/DL (ref 70–130)
GLUCOSE BLDC GLUCOMTR-MCNC: 215 MG/DL (ref 70–130)
GLUCOSE BLDC GLUCOMTR-MCNC: 216 MG/DL (ref 70–130)
GLUCOSE SERPL-MCNC: 241 MG/DL (ref 65–99)
HCT VFR BLD AUTO: 22.8 % (ref 34–46.6)
HCT VFR BLD AUTO: 24 % (ref 34–46.6)
HGB BLD-MCNC: 7.6 G/DL (ref 12–15.9)
HGB BLD-MCNC: 7.7 G/DL (ref 12–15.9)
IMM GRANULOCYTES # BLD AUTO: 0.08 10*3/MM3 (ref 0–0.05)
IMM GRANULOCYTES NFR BLD AUTO: 0.6 % (ref 0–0.5)
LAB AP CASE REPORT: NORMAL
LYMPHOCYTES # BLD AUTO: 1.76 10*3/MM3 (ref 0.7–3.1)
LYMPHOCYTES NFR BLD AUTO: 12.7 % (ref 19.6–45.3)
MCH RBC QN AUTO: 29.1 PG (ref 26.6–33)
MCHC RBC AUTO-ENTMCNC: 31.7 G/DL (ref 31.5–35.7)
MCV RBC AUTO: 92 FL (ref 79–97)
MONOCYTES # BLD AUTO: 1.6 10*3/MM3 (ref 0.1–0.9)
MONOCYTES NFR BLD AUTO: 11.6 % (ref 5–12)
NEUTROPHILS NFR BLD AUTO: 10.15 10*3/MM3 (ref 1.7–7)
NEUTROPHILS NFR BLD AUTO: 73.3 % (ref 42.7–76)
NRBC BLD AUTO-RTO: 0 /100 WBC (ref 0–0.2)
PATH REPORT.FINAL DX SPEC: NORMAL
PATH REPORT.GROSS SPEC: NORMAL
PLATELET # BLD AUTO: 196 10*3/MM3 (ref 140–450)
PMV BLD AUTO: 12.2 FL (ref 6–12)
POTASSIUM SERPL-SCNC: 4.2 MMOL/L (ref 3.5–5.2)
PROT SERPL-MCNC: 5.1 G/DL (ref 6–8.5)
RBC # BLD AUTO: 2.61 10*6/MM3 (ref 3.77–5.28)
SODIUM SERPL-SCNC: 133 MMOL/L (ref 136–145)
UNIT  ABO: NORMAL
UNIT  ABO: NORMAL
UNIT  RH: NORMAL
UNIT  RH: NORMAL
WBC NRBC COR # BLD: 13.84 10*3/MM3 (ref 3.4–10.8)

## 2022-01-12 PROCEDURE — 25010000002 PIPERACILLIN SOD-TAZOBACTAM PER 1 G: Performed by: HOSPITALIST

## 2022-01-12 PROCEDURE — 63710000001 INSULIN LISPRO (HUMAN) PER 5 UNITS: Performed by: SURGERY

## 2022-01-12 PROCEDURE — 85014 HEMATOCRIT: CPT | Performed by: PHYSICIAN ASSISTANT

## 2022-01-12 PROCEDURE — 80053 COMPREHEN METABOLIC PANEL: CPT | Performed by: SURGERY

## 2022-01-12 PROCEDURE — 82962 GLUCOSE BLOOD TEST: CPT

## 2022-01-12 PROCEDURE — 97162 PT EVAL MOD COMPLEX 30 MIN: CPT

## 2022-01-12 PROCEDURE — 85025 COMPLETE CBC W/AUTO DIFF WBC: CPT | Performed by: SURGERY

## 2022-01-12 PROCEDURE — 85018 HEMOGLOBIN: CPT | Performed by: PHYSICIAN ASSISTANT

## 2022-01-12 PROCEDURE — 74018 RADEX ABDOMEN 1 VIEW: CPT

## 2022-01-12 PROCEDURE — 97110 THERAPEUTIC EXERCISES: CPT

## 2022-01-12 PROCEDURE — 99232 SBSQ HOSP IP/OBS MODERATE 35: CPT | Performed by: PHYSICIAN ASSISTANT

## 2022-01-12 PROCEDURE — 99024 POSTOP FOLLOW-UP VISIT: CPT | Performed by: SURGERY

## 2022-01-12 RX ADMIN — PANTOPRAZOLE SODIUM 40 MG: 40 TABLET, DELAYED RELEASE ORAL at 08:55

## 2022-01-12 RX ADMIN — INSULIN LISPRO 4 UNITS: 100 INJECTION, SOLUTION INTRAVENOUS; SUBCUTANEOUS at 12:29

## 2022-01-12 RX ADMIN — INSULIN LISPRO 4 UNITS: 100 INJECTION, SOLUTION INTRAVENOUS; SUBCUTANEOUS at 08:54

## 2022-01-12 RX ADMIN — FUROSEMIDE 40 MG: 40 TABLET ORAL at 08:55

## 2022-01-12 RX ADMIN — OLANZAPINE 5 MG: 5 TABLET ORAL at 21:27

## 2022-01-12 RX ADMIN — ACETAMINOPHEN 650 MG: 325 TABLET, FILM COATED ORAL at 21:27

## 2022-01-12 RX ADMIN — ROPINIROLE HYDROCHLORIDE 0.5 MG: 0.5 TABLET, FILM COATED ORAL at 08:55

## 2022-01-12 RX ADMIN — HYDROCODONE BITARTRATE AND ACETAMINOPHEN 2 TABLET: 5; 325 TABLET ORAL at 02:25

## 2022-01-12 RX ADMIN — INSULIN LISPRO 3 UNITS: 100 INJECTION, SOLUTION INTRAVENOUS; SUBCUTANEOUS at 12:29

## 2022-01-12 RX ADMIN — FUROSEMIDE 40 MG: 40 TABLET ORAL at 18:23

## 2022-01-12 RX ADMIN — HYDROCODONE BITARTRATE AND ACETAMINOPHEN 1 TABLET: 5; 325 TABLET ORAL at 12:47

## 2022-01-12 RX ADMIN — TAZOBACTAM SODIUM AND PIPERACILLIN SODIUM 3.38 G: 375; 3 INJECTION, SOLUTION INTRAVENOUS at 02:48

## 2022-01-12 RX ADMIN — SERTRALINE 100 MG: 100 TABLET, FILM COATED ORAL at 08:55

## 2022-01-12 RX ADMIN — MULTIPLE VITAMINS W/ MINERALS TAB 1 TABLET: TAB at 08:55

## 2022-01-12 RX ADMIN — ROPINIROLE HYDROCHLORIDE 0.5 MG: 0.5 TABLET, FILM COATED ORAL at 21:27

## 2022-01-12 RX ADMIN — INSULIN LISPRO 3 UNITS: 100 INJECTION, SOLUTION INTRAVENOUS; SUBCUTANEOUS at 18:24

## 2022-01-12 RX ADMIN — OLANZAPINE 5 MG: 5 TABLET ORAL at 08:55

## 2022-01-12 RX ADMIN — INSULIN LISPRO 3 UNITS: 100 INJECTION, SOLUTION INTRAVENOUS; SUBCUTANEOUS at 08:54

## 2022-01-12 RX ADMIN — INSULIN LISPRO 4 UNITS: 100 INJECTION, SOLUTION INTRAVENOUS; SUBCUTANEOUS at 18:24

## 2022-01-12 RX ADMIN — TAMSULOSIN HYDROCHLORIDE 0.4 MG: 0.4 CAPSULE ORAL at 08:55

## 2022-01-12 RX ADMIN — AMLODIPINE BESYLATE 5 MG: 5 TABLET ORAL at 08:55

## 2022-01-12 RX ADMIN — FOLIC ACID 1 MG: 1 TABLET ORAL at 08:55

## 2022-01-12 RX ADMIN — LEVOTHYROXINE SODIUM 125 MCG: 0.12 TABLET ORAL at 08:55

## 2022-01-12 RX ADMIN — METOPROLOL TARTRATE 25 MG: 25 TABLET, FILM COATED ORAL at 21:27

## 2022-01-12 RX ADMIN — ASPIRIN 81 MG: 81 TABLET, COATED ORAL at 08:55

## 2022-01-12 RX ADMIN — HYDROCODONE BITARTRATE AND ACETAMINOPHEN 1 TABLET: 5; 325 TABLET ORAL at 08:55

## 2022-01-12 RX ADMIN — Medication 3 MG: at 21:27

## 2022-01-12 NOTE — PLAN OF CARE
Goal Outcome Evaluation:  Plan of Care Reviewed With: patient        Progress: no change  Outcome Summary: VSS, remains on 2L nc, Norco Q 4 hours, no complaints of N/V, tolerating diet, Zosyn continues, monitor HGB, will continue to monitor

## 2022-01-12 NOTE — PROGRESS NOTES
NEPHROLOGY PROGRESS NOTE    PATIENT IDENTIFICATION:   Name:  Zaina Martinez      MRN:  [id number]     56 y.o.  female             Reason for visit:ESRD    SUBJECTIVE:     Seen and examined.  Patient had removal of 4 gallbladder yesterday by Dr. Raygoza.  Resting comfortably.  Currently on 2 L of nasal cannula.  No shortness of air or chest pain.    OBJECTIVE:  Vitals:    01/12/22 0015 01/12/22 0500 01/12/22 0737 01/12/22 1324   BP: 100/51  105/45 109/58   BP Location: Left arm  Left arm Left arm   Patient Position: Lying  Lying Lying   Pulse: 58  51 53   Resp: 16  16 14   Temp: 97.3 °F (36.3 °C)  97.7 °F (36.5 °C) 98.6 °F (37 °C)   TempSrc: Temporal  Temporal Temporal   SpO2: 96%  95% 91%   Weight:  69.9 kg (154 lb 1.6 oz)     Height:               Body mass index is 28.19 kg/m².    Intake/Output Summary (Last 24 hours) at 1/12/2022 1351  Last data filed at 1/11/2022 1845  Gross per 24 hour   Intake 602 ml   Output 2700 ml   Net -2098 ml         Exam:  GEN:  No distress, appears stated age  EYES:   Anicteric sclera  ENT:    External ears/nose normal, MM are moist  NECK:  No adenopathy, JVP none  LUNGS: Normal chest on inspection; not labored  CV:  Normal S1S2, without murmur  ABD:  Non-tender, non-distended, no hepatosplenomegaly, +BS  EXT:  No edema; no cyanosis; clubbing    Scheduled meds:  amLODIPine, 5 mg, Oral, Daily  aspirin, 81 mg, Oral, Daily  folic acid, 1 mg, Oral, Daily  furosemide, 40 mg, Oral, BID  insulin lispro, 0-9 Units, Subcutaneous, TID With Meals  insulin lispro, 3 Units, Subcutaneous, TID With Meals  levothyroxine, 125 mcg, Oral, Daily  metoprolol tartrate, 25 mg, Oral, BID  multivitamin with minerals, 1 tablet, Oral, Daily  OLANZapine, 5 mg, Oral, BID  pantoprazole, 40 mg, Oral, Daily  rOPINIRole, 0.5 mg, Oral, Q12H  sertraline, 100 mg, Oral, Daily  tamsulosin, 0.4 mg, Oral, Daily      IV meds:                           Data Review:    Results from last 7 days   Lab Units 01/12/22  0874  01/11/22  0746 01/10/22  0626   SODIUM mmol/L 133* 134* 132*   POTASSIUM mmol/L 4.2 4.4 4.7   CHLORIDE mmol/L 98 99 96*   CO2 mmol/L 20.4* 18.5* 21.3*   BUN mg/dL 20 28* 51*   CREATININE mg/dL 2.53* 3.02* 4.03*   CALCIUM mg/dL 7.3* 7.6* 8.1*   BILIRUBIN mg/dL 1.6* 2.5* 3.8*   ALK PHOS U/L 525* 608* 811*   ALT (SGPT) U/L 35* 41* 35*   AST (SGOT) U/L 56* 86* 47*   GLUCOSE mg/dL 241* 160* 196*       Estimated Creatinine Clearance: 22.7 mL/min (A) (by C-G formula based on SCr of 2.53 mg/dL (H)).          Results from last 7 days   Lab Units 01/12/22  0808 01/11/22  0746 01/10/22  0626 01/09/22  0833   WBC 10*3/mm3 13.84* 16.52* 10.91* 11.39*   HEMOGLOBIN g/dL 7.6* 6.1* 8.2* 9.2*   PLATELETS 10*3/mm3 196 264 233 244                   ASSESSMENT:     Calculus of gallbladder with acute on chronic cholecystitis without obstruction    HTN (hypertension)    Type 2 diabetes mellitus with hyperglycemia, with long-term current use of insulin (HCC)    Rheumatoid arthritis (HCC)    ESRD (end stage renal disease) (HCC)    CAD (coronary artery disease)    Acute on chronic diastolic CHF (congestive heart failure) (HCC)    Pyelonephritis    Pleural effusion on right    Anemia due to chronic kidney disease, on chronic dialysis (HCC)      ESRD-  Normal dialysis at [facility].  TD for access.   Discussed with her and  importance of staying on dialysis full treatment.  Volume Overload-  continue to diurese and UF with dialysis Discussed fluid restriction to prevent large intra dialytic weight gain.  Acute cholecystitis:Postop day 2 after cholecystectomy   Anemia-  Monitor, not far from goal. Procrit with HD  HTN-  UF as tolarted.  RLS  DMII-  Poor control previously.    PLAN:    Hemodialysis at AM   Surveillance labs      Ruthann Palmer MD  1/12/2022    13:51 EST

## 2022-01-12 NOTE — PROGRESS NOTES
Postop day 2 laparoscopic cholecystectomy    Subjective:  Overall little bit better, still reporting a fair amount of pain but this does seem to be improved. Tolerating some solid food but appetite is poor.    Objective:  Afebrile, heart rate 50s to 60s blood pressure 105/45  General: Awake and alert, no distress  Eyes: Minimal scleral icterus  Abdomen: Soft, subjective tenderness, seemingly improved    Labs reviewed. White count 13.8 from 16, hemoglobin improved to 7.6 after transfusion. Liver function studies continue to improve, total bilirubin has trended down to 1.6.    Assessment and plan:  -Postop day 2 after cholecystectomy for acute cholecystitis, clinically improving  -I have asked the patient to cut back on narcotics  -Mobilize as able  -Monitor hemoglobin    Ramana Raygoza MD  General and Endoscopic Surgery  [facility]    [address], [address]  [place], [address]  P: [phone]  F: [phone]

## 2022-01-12 NOTE — PROGRESS NOTES
Name: Zaina Martinez ADMIT: 2022   : 1965  PCP: Karson Oreilly MD    MRN: [id number] LOS: 3 days   AGE/SEX: 56 y.o. female  ROOM: [facility]     Subjective   Subjective    S/p fannie  Still in a lot of pain but seems better than yesterday    Review of Systems     Objective   Objective   Vital Signs  Temp:  [97.1 °F (36.2 °C)-98.6 °F (37 °C)] 98.6 °F (37 °C)  Heart Rate:  [49-62] 53  Resp:  [14-16] 14  BP: ()/(45-59) 109/58  SpO2:  [91 %-96 %] 91 %  on  Flow (L/min):  [2] 2;   Device (Oxygen Therapy): room air  Body mass index is 28.19 kg/m².    Intake/Output Summary (Last 24 hours) at 2022 1531  Last data filed at 2022 1845  Gross per 24 hour   Intake 300 ml   Output 2700 ml   Net -2400 ml   Net IO Since Admission: -5,525 mL [22 1531]   Wt Readings from Last 1 Encounters:   22 0500 69.9 kg (154 lb 1.6 oz)   22 0500 86.3 kg (190 lb 4.1 oz)   01/10/22 0700 82.3 kg (181 lb 8 oz)   22 1808 77.6 kg (171 lb)   22 1006 79.4 kg (175 lb)     Wt Readings from Last 3 Encounters:   22 69.9 kg (154 lb 1.6 oz)   21 71.8 kg (158 lb 3.2 oz)   21 69.9 kg (154 lb)       Physical Exam  Vitals and nursing note reviewed.   Constitutional:       General: She is not in acute distress.     Appearance: She is ill-appearing.   Neck:      Vascular: No JVD.      Trachea: No tracheal deviation.   Cardiovascular:      Rate and Rhythm: Normal rate and regular rhythm.      Heart sounds: Normal heart sounds.   Pulmonary:      Effort: Pulmonary effort is normal. No respiratory distress.      Breath sounds: Normal breath sounds.   Abdominal:      General: Bowel sounds are normal.      Palpations: Abdomen is soft.      Tenderness: There is no abdominal tenderness.   Musculoskeletal:      Right lower leg: Edema present.      Left lower leg: Edema present.   Skin:     General: Skin is warm and dry.      Coloration: Skin is pale.   Neurological:      General: No focal deficit  present.      Mental Status: She is alert and oriented to person, place, and time.   Psychiatric:         Attention and Perception: She is inattentive.         Cognition and Memory: Cognition is impaired.         Results Review     I reviewed the patient's new clinical results.  Results from last 7 days   Lab Units 01/12/22  0808 01/11/22  0746 01/10/22  0626 01/09/22  0833   WBC 10*3/mm3 13.84* 16.52* 10.91* 11.39*   HEMOGLOBIN g/dL 7.6* 6.1* 8.2* 9.2*   PLATELETS 10*3/mm3 196 264 233 244     Results from last 7 days   Lab Units 01/12/22  0808 01/11/22  0746 01/10/22  0626 01/09/22  0946   SODIUM mmol/L 133* 134* 132* 135*   POTASSIUM mmol/L 4.2 4.4 4.7 4.6   CHLORIDE mmol/L 98 99 96* 96*   CO2 mmol/L 20.4* 18.5* 21.3* 22.7   BUN mg/dL 20 28* 51* 45*   CREATININE mg/dL 2.53* 3.02* 4.03* 3.57*   GLUCOSE mg/dL 241* 160* 196* 217*   EGFR IF NONAFRICN AM mL/min/1.73 20* 16* 11* 13*   ALBUMIN g/dL 2.20* 2.30* 2.60* 2.90*   BILIRUBIN mg/dL 1.6* 2.5* 3.8* 5.6*   ALK PHOS U/L 525* 608* 811* 1,021*   AST (SGOT) U/L 56* 86* 47* 77*   ALT (SGPT) U/L 35* 41* 35* 46*     Results from last 7 days   Lab Units 01/12/22  0808 01/11/22  0746 01/10/22  0626 01/09/22  0946   CALCIUM mg/dL 7.3* 7.6* 8.1* 8.2*   ALBUMIN g/dL 2.20* 2.30* 2.60* 2.90*     Hemoglobin A1C   Date/Time Value Ref Range Status   01/10/2022 0626 6.90 (H) 4.80 - 5.60 % Final     Glucose   Date/Time Value Ref Range Status   01/12/2022 1155 206 (H) 70 - 130 mg/dL Final     Comment:     Meter: [id number] : [id number] Nora Saima RN   01/12/2022 0618 215 (H) 70 - 130 mg/dL Final     Comment:     Meter: [id number] : [id number] Sunni Duenas NA   01/11/2022 2152 165 (H) 70 - 130 mg/dL Final     Comment:     Meter: [id number] : [id number] Sunni SAAVEDRA   01/11/2022 1054 147 (H) 70 - 130 mg/dL Final     Comment:     Meter: [id number] : [id number] Domo Crawford NA   01/11/2022 0618 154 (H) 70 - 130 mg/dL Final     Comment:     Meter: [id number] :  [id number] Jay Turk QUENTIN   01/10/2022 1928 119 70 - 130 mg/dL Final     Comment:     Meter: [id number] : [id number] Gareth BAL RN   01/10/2022 1654 115 70 - 130 mg/dL Final     Comment:     Meter: [id number] : [id number] Valeri Rodriguez RN     [place] Cholangiogram Operative  Narrative: [place] CHOLANGIOGRAM OPERATIVE-     INDICATIONS: Operative cholangiography     TECHNIQUE: FLUOROSCOPIC ASSISTANCE IN THE OPERATING ROOM.     FINDINGS:     154 intraoperative fluoroscopic spot views were obtained and recorded  the PACS for review, revealing opacification of cystic duct remnant,  extrahepatic and central intrahepatic biliary ducts, and a portion of  the duodenum. No biliary obstruction or definite persistent filling  defect. Please see operative report for full details.     Fluoroscopy time: 24.5 seconds     Impression:    As described.     This report was finalized on 1/10/2022 7:44 PM by Dr. Jared Kern M.D.         Scheduled Medications  amLODIPine, 5 mg, Oral, Daily  aspirin, 81 mg, Oral, Daily  [START ON 1/14/2022] epoetin brooke/brooke-epbx, 10,000 Units, Subcutaneous, Once per day on Mon Wed Fri  folic acid, 1 mg, Oral, Daily  furosemide, 40 mg, Oral, BID  insulin lispro, 0-9 Units, Subcutaneous, TID With Meals  insulin lispro, 3 Units, Subcutaneous, TID With Meals  levothyroxine, 125 mcg, Oral, Daily  metoprolol tartrate, 25 mg, Oral, BID  multivitamin with minerals, 1 tablet, Oral, Daily  OLANZapine, 5 mg, Oral, BID  pantoprazole, 40 mg, Oral, Daily  rOPINIRole, 0.5 mg, Oral, Q12H  sertraline, 100 mg, Oral, Daily  tamsulosin, 0.4 mg, Oral, Daily    Infusions   Diet  Diet Regular; GI Soft, Consistent Carbohydrate       Assessment/Plan     Active Hospital Problems    Diagnosis  POA   • **Calculus of gallbladder with acute on chronic cholecystitis without obstruction [K80.12]  Yes   • Anemia due to chronic kidney disease, on chronic dialysis (HCC) [N18.6, D63.1, Z99.2]  Not Applicable   •  Pyelonephritis [N12]  Yes   • Pleural effusion on right [J90]  Yes   • Acute on chronic diastolic CHF (congestive heart failure) ([facility]) [I50.33]  Yes   • ESRD (end stage renal disease) ([facility]) [N18.6]  Yes   • CAD (coronary artery disease) [I25.10]  Yes   • HTN (hypertension) [I10]  Yes   • Type 2 diabetes mellitus with hyperglycemia, with long-term current use of insulin ([facility]) [E11.65, Z79.4]  Not Applicable   • Rheumatoid arthritis ([facility]) [M06.9]  Yes      Resolved Hospital Problems   No resolved problems to display.       56 y.o. female admitted with Calculus of gallbladder with acute on chronic cholecystitis without obstruction.    She is s/p Laparoscopic cholecystectomy with intraoperative cholangiogram    Labs are better.  Hemoglobin still low but improved.  Scheduled for dialysis again tomorrow which is her normal schedule.  Agree with surgery regarding decreasing pain medication.  Continue IV antibiotic for now while in hospital.  Possible discharge in next day or 2 however patient says she does not want to go back to [place].  Discussed with RN who will notify CCP.      · SCDs for DVT prophylaxis.  · Full code.  · Discussed with patient and nursing staff.  · Anticipate discharge to SNU facility in 1-2 days.      Fran Yo MD  [facility]  01/12/22  15:31 EST

## 2022-01-12 NOTE — PLAN OF CARE
Goal Outcome Evaluation:  Plan of Care Reviewed With: patient           Outcome Summary: 55yo white female admitted 1/9/22 with acute cholecystitis; now s/p Laparoscopic cholecystectomy with cholangiogram 1/10. PMH includes hbp, DM II, RA, WSKD, coronary heart disease, heart failure, anemia, MAYUR, acid reflux. PLOF: Pt reports she lives at home with  and grandkids with 3 SULEMA adn 1 flight to basement. Pt reports she was independent with ADLs and mobility (using r wx). She is primarily limited at thiws wtime by generalized weakness, mod c/o pain, and decreased activity tolerance that limit her functional mobility. Today, she c/o incisional pain = 7-8/10. She req min A for bed mobility and CGA for sit/stand from EOB. She amb 90' with r wx slow and guarded, fatigues quickly. She would certainly benefit from skilled PT services for ther ex, gt/transfer training, bed mobility, balance, HEP instruction, and endurance as appropriate to prepare for d/c home.  Patient was intermittently wearing a face mask during this therapy encounter. Therapist used appropriate personal protective equipment including eye protection, mask, and gloves.  Mask used was standard procedure mask. Appropriate PPE was worn during the entire therapy session. Hand hygiene was completed before and after therapy session. Patient is not in enhanced droplet precautions.

## 2022-01-12 NOTE — THERAPY EVALUATION
Patient Name: Zaina Martinez  : 1965    MRN: [id number]                              Today's Date: 2022       Admit Date: 2022    Visit Dx:     ICD-10-CM ICD-9-CM   1. Pyelonephritis  N12 590.80   2. Hyperbilirubinemia  E80.6 782.4   3. ESRD (end stage renal disease) ([facility])  N18.6 585.6   4. Anasarca  R60.1 782.3   5. Other ascites  R18.8 789.59   6. Pleural effusion on right  J90 511.9   7. Calculus of gallbladder with acute on chronic cholecystitis without obstruction  K80.12 574.00     574.10     Patient Active Problem List   Diagnosis   • MAYUR (acute kidney injury) ([facility])   • HTN (hypertension)   • Hypothyroidism   • Type 2 diabetes mellitus with hyperglycemia, with long-term current use of insulin ([facility])   • Rheumatoid arthritis ([facility])   • Angioedema   • GERD (gastroesophageal reflux disease)   • Anemia   • Medically noncompliant   • Myocardial infarction due to demand ischemia ([facility])   • Enteritis   • PRES (posterior reversible encephalopathy syndrome)   • Urine retention   • Klebsiella infection   • Superficial thrombophlebitis   • Generalized weakness   • ESRD (end stage renal disease) ([facility])   • CAD (coronary artery disease)   • Abnormal urinalysis   • Acute on chronic diastolic CHF (congestive heart failure) ([facility])   • Pyelonephritis   • Calculus of gallbladder with acute on chronic cholecystitis without obstruction   • Pleural effusion on right   • Anemia due to chronic kidney disease, on chronic dialysis ([facility])     Past Medical History:   Diagnosis Date   • Acute on chronic diastolic CHF (congestive heart failure) ([facility])    • CAD (coronary artery disease) 2021   • Diabetes ([facility])    • Disease of thyroid gland    • GERD (gastroesophageal reflux disease)    • Hypertension    • Rheumatoid arthritis (HCC)      Past Surgical History:   Procedure Laterality Date   • CHOLECYSTECTOMY WITH INTRAOPERATIVE CHOLANGIOGRAM N/A 1/10/2022    Procedure: Laparoscopic cholecystectomy with intraoperative  cholangiogram;  Surgeon: Ramana Raygoza MD;  Location: [facility] MAIN OR;  Service: General;  Laterality: N/A;   • EYE SURGERY     • HYSTERECTOMY     • INSERTION HEMODIALYSIS CATHETER N/A 12/6/2021    Procedure: HEMODIALYSIS CATHETER INSERTION;  Surgeon: Keli Salazar MD;  Location: [facility] HYBRID OR 18/19;  Service: Vascular;  Laterality: N/A;      General Information     Row Name 01/12/22 1120          Physical Therapy Time and Intention    Document Type evaluation  -DJ     Mode of Treatment individual therapy; physical therapy  -DJ     Row Name 01/12/22 1120          General Information    Patient Profile Reviewed yes  -DJ     Prior Level of Function independent:; ADL's; all household mobility  -DJ     Existing Precautions/Restrictions fall  -DJ     Barriers to Rehab medically complex  -DJ     Row Name 01/12/22 1120          Living Environment    Lives With spouse; grandchild(pro)  -DJ     Row Name 01/12/22 1120          Home Main Entrance    Number of Stairs, Main Entrance three  -DJ     Row Name 01/12/22 1120          Stairs Within Home, Primary    Stairs, Within Home, Primary flight to basement  -DJ     Row Name 01/12/22 1120          Cognition    Orientation Status (Cognition) oriented x 3  pleasant and cooperative  -DJ     Row Name 01/12/22 1120          Safety Issues, Functional Mobility    Impairments Affecting Function (Mobility) pain; endurance/activity tolerance  -DJ     Comment, Safety Issues/Impairments (Mobility) gt belt, nonskid socks  -DJ           User Key  (r) = Recorded By, (t) = Taken By, (c) = Cosigned By    Initials Name Provider Type    DJ Corinna Barber, PT Physical Therapist               Mobility     Row Name 01/12/22 1122          Bed Mobility    Bed Mobility supine-sit; sit-supine  -DJ     Supine-Sit [place] (Bed Mobility) minimum assist (75% patient effort); verbal cues  -DJ     Sit-Supine [place] (Bed Mobility) contact guard; verbal cues  -DJ     Assistive Device (Bed  Mobility) bed rails; head of bed elevated  -DJ     Comment (Bed Mobility) moves slowly and guarded due to post-op pain  -DJ     Row Name 01/12/22 1122          Transfers    Comment (Transfers) sit/stand from EOB  -DJ     Row Name 01/12/22 1122          Bed-Chair Transfer    Bed-Chair [place] (Transfers) not tested  -DJ     Row Name 01/12/22 1122          Sit-Stand Transfer    Sit-Stand [place] (Transfers) contact guard; verbal cues  -DJ     Assistive Device (Sit-Stand Transfers) walker, front-wheeled  -DJ     Row Name 01/12/22 1122          Gait/Stairs (Locomotion)    [place] Level (Gait) contact guard; verbal cues  -DJ     Assistive Device (Gait) walker, front-wheeled  -DJ     Distance in Feet (Gait) 90'  -DJ     Deviations/Abnormal Patterns (Gait) antalgic; gait speed decreased; kenn decreased; stride length decreased  -DJ     Bilateral Gait Deviations forward flexed posture  -DJ     [place] Level (Stairs) not tested  -DJ     Comment (Gait/Stairs) 90' with r wx, very slow and guarded, fatigeus quickly, pain limits further amb distance  -DJ           User Key  (r) = Recorded By, (t) = Taken By, (c) = Cosigned By    Initials Name Provider Type    Corinna Nevarez, PT Physical Therapist               Obj/Interventions     Row Name 01/12/22 1124          Range of Motion Comprehensive    General Range of Motion no range of motion deficits identified  -DJ     [facility] Name 01/12/22 1124          Strength Comprehensive (MMT)    Comment, General Manual Muscle Testing (MMT) Assessment goo dextremity strength  -DJ     Row Name 01/12/22 1124          Motor Skills    Motor Skills functional endurance  -DJ     Functional Endurance fatigues with amb  -DJ     Row Name 01/12/22 1124          Balance    Balance Assessment sitting static balance; standing static balance; standing dynamic balance  -DJ     Static Sitting Balance WFL; unsupported; sitting, edge of bed  -DJ     Static Standing Balance WFL; supported;  standing  -DJ     Dynamic Standing Balance WFL; supported; standing  -DJ     Balance Interventions sitting; standing; sit to stand; supported; weight shifting activity  -DJ     Row Name 01/12/22 1124          Sensory Assessment (Somatosensory)    Sensory Assessment (Somatosensory) not tested  -DJ           User Key  (r) = Recorded By, (t) = Taken By, (c) = Cosigned By    Initials Name Provider Type    DJ Corinna Barber, PT Physical Therapist               Goals/Plan     Row Name 01/12/22 1133          Bed Mobility Goal 1 (PT)    Activity/Assistive Device (Bed Mobility Goal 1, PT) sit to supine; supine to sit  -DJ     [place] Level/Cues Needed (Bed Mobility Goal 1, PT) modified independence; verbal cues required  -DJ     Time Frame (Bed Mobility Goal 1, PT) 1 week  -DJ     Row Name 01/12/22 1133          Transfer Goal 1 (PT)    Activity/Assistive Device (Transfer Goal 1, PT) sit-to-stand/stand-to-sit  -DJ     [place] Level/Cues Needed (Transfer Goal 1, PT) modified independence; verbal cues required  -DJ     Time Frame (Transfer Goal 1, PT) 1 week  -DJ     Row Name 01/12/22 1133          Gait Training Goal 1 (PT)    Activity/Assistive Device (Gait Training Goal 1, PT) gait (walking locomotion); assistive device use; improve balance and speed; increase endurance/gait distance; increase energy conservation; walker, rolling  -DJ     [place] Level (Gait Training Goal 1, PT) standby assist; verbal cues required  -DJ     Distance (Gait Training Goal 1, PT) >350'  -DJ     Time Frame (Gait Training Goal 1, PT) 1 week  -DJ     Row Name 01/12/22 1133          Stairs Goal 1 (PT)    Activity/Assistive Device (Stairs Goal 1, PT) ascending stairs; descending stairs  -DJ     [place] Level/Cues Needed (Stairs Goal 1, PT) contact guard assist; verbal cues required  -DJ     Number of Stairs (Stairs Goal 1, PT) 3  -DJ     Time Frame (Stairs Goal 1, PT) 1 week  -DJ     Row Name 01/12/22 1133          Patient Education  Goal (PT)    Activity (Patient Education Goal, PT) HEP  -DJ     [place]/Cues/Accuracy (Memory Goal 2, PT) demonstrates adequately; verbalizes understanding  -DJ     Time Frame (Patient Education Goal, PT) 3 days; short term goal (STG)  -DJ           User Key  (r) = Recorded By, (t) = Taken By, (c) = Cosigned By    Initials Name Provider Type    Corinna Nevarez, PT Physical Therapist               Clinical Impression     Row Name 01/12/22 1125          Pain    Additional Documentation Pain Scale: Numbers Pre/Post-Treatment (Group)  -DJ     Row Name 01/12/22 1125          Pain Scale: Numbers Pre/Post-Treatment    Posttreatment Pain Rating 8/10  -DJ     Pain Location abdomen  -DJ     Pre/Posttreatment Pain Comment incision site  -DJ     Row Name 01/12/22 1128 01/12/22 1125       Plan of Care Review    Plan of Care Reviewed With -- patient  -DJ    Outcome Summary 57yo white female admitted 1/9/22 with acute cholecystitis; now s/p Laparoscopic cholecystectomy with cholangiogram 1/10. PMH includes hbp, DM II, RA, WSKD, coronary heart disease, heart failure, anemia, MAYUR, acid reflux. PLOF: Pt reports she lives at home with  and grandkids with 3 SULEMA adn 1 flight to basement. Pt reports she was independent with ADLs and mobility (using r wx). She is primarily limited at thiws wtime by generalized weakness, mod c/o pain, and decreased activity tolerance that limit her functional mobility. Today, she c/o incisional pain = 7-8/10. She req min A for bed mobility and CGA for sit/stand from EOB. She amb 90' with r wx slow and guarded, fatigues quickly. She would certainly benefit from skilled PT services for ther ex, gt/transfer training, bed mobility, balance, HEP instruction, and endurance as appropriate to prepare for d/c home.  -DJ --  -DJ    Row Name 01/12/22 1128          Therapy Assessment/Plan (PT)    Patient/Family Therapy Goals Statement (PT) home  -DJ     Rehab Potential (PT) good, to achieve stated therapy  goals  -DJ     Criteria for Skilled Interventions Met (PT) yes; meets criteria; skilled treatment is necessary  -DJ     Row Name 01/12/22 1128          Vital Signs    O2 Delivery Pre Treatment room air  -DJ     O2 Delivery Intra Treatment room air  -DJ     O2 Delivery Post Treatment room air  -DJ     Pre Patient Position Supine  -DJ     Intra Patient Position Standing  -DJ     Post Patient Position Supine  -DJ     Row Name 01/12/22 1128          Positioning and Restraints    Pre-Treatment Position in bed  -DJ     Post Treatment Position bed  -DJ     In Bed notified nsg; supine; call light within reach; encouraged to call for assist; exit alarm on  -DJ           User Key  (r) = Recorded By, (t) = Taken By, (c) = Cosigned By    Initials Name Provider Type    Corinna Nevarez, PT Physical Therapist               Outcome Measures     Row Name 01/12/22 1134          How much help from another person do you currently need...    Turning from your back to your side while in flat bed without using bedrails? 3  -DJ     Moving from lying on back to sitting on the side of a flat bed without bedrails? 3  -DJ     Moving to and from a bed to a chair (including a wheelchair)? 3  -DJ     Standing up from a chair using your arms (e.g., wheelchair, bedside chair)? 3  -DJ     Climbing 3-5 steps with a railing? 2  -DJ     To walk in hospital room? 3  -DJ     AM-PAC 6 Clicks Score (PT) 17  -DJ     Row Name 01/12/22 1134          Functional Assessment    Outcome Measure Options AM-PAC 6 Clicks Basic Mobility (PT)  -DJ           User Key  (r) = Recorded By, (t) = Taken By, (c) = Cosigned By    Initials Name Provider Type    Corinna Nevarez, PT Physical Therapist                             Physical Therapy Education                 Title: PT OT SLP Therapies (In Progress)     Topic: Physical Therapy (Done)     Point: Mobility training (Done)     Learning Progress Summary           Patient Acceptance, E, VU by PING at 1/12/2022 1134                    Point: Home exercise program (Done)     Learning Progress Summary           Patient Acceptance, E, VU by DJ at 1/12/2022 1134                   Point: Body mechanics (Done)     Learning Progress Summary           Patient Acceptance, E, VU by DJ at 1/12/2022 1134                   Point: Precautions (Done)     Learning Progress Summary           Patient Acceptance, E, VU by DJ at 1/12/2022 1134                               User Key     Initials Effective Dates Name Provider Type Discipline    PING 10/25/19 -  Corinna Barber, PT Physical Therapist PT              PT Recommendation and Plan  Planned Therapy Interventions (PT): balance training, bed mobility training, gait training, home exercise program, strengthening, stair training, postural re-education, patient/family education, transfer training  Plan of Care Reviewed With: patient  Outcome Summary: 55yo white female admitted 1/9/22 with acute cholecystitis; now s/p Laparoscopic cholecystectomy with cholangiogram 1/10. PMH includes hbp, DM II, RA, WSKD, coronary heart disease, heart failure, anemia, MAYUR, acid reflux. PLOF: Pt reports she lives at home with  and grandkids with 3 SULEMA adn 1 flight to basement. Pt reports she was independent with ADLs and mobility (using r wx). She is primarily limited at thiws wtime by generalized weakness, mod c/o pain, and decreased activity tolerance that limit her functional mobility. Today, she c/o incisional pain = 7-8/10. She req min A for bed mobility and CGA for sit/stand from EOB. She amb 90' with r wx slow and guarded, fatigues quickly. She would certainly benefit from skilled PT services for ther ex, gt/transfer training, bed mobility, balance, HEP instruction, and endurance as appropriate to prepare for d/c home.     Time Calculation:    PT Charges     Row Name 01/12/22 1136             Time Calculation    Start Time 1037  -DJ      Stop Time 1056  -DJ      Time Calculation (min) 19 min  -DJ      PT  Non-Billable Time (min) 10 min  -PING      PT Received On 01/12/22  -PING      PT - Next Appointment 01/13/22  -PING      PT Goal Re-Cert Due Date 01/19/22  -PING            User Key  (r) = Recorded By, (t) = Taken By, (c) = Cosigned By    Initials Name Provider Type    Corinna Nevarez, PT Physical Therapist              Therapy Charges for Today     Code Description Service Date Service Provider Modifiers Qty    20255035113 HC PT EVAL MOD COMPLEXITY 2 1/12/2022 Corinna Barber, PT GP 1    92619530154 HC PT THER PROC EA 15 MIN 1/12/2022 Corinna Barber, PT GP 1          PT G-Codes  Outcome Measure Options: AM-PAC 6 Clicks Basic Mobility (PT)  AM-PAC 6 Clicks Score (PT): 17    Corinna Barber PT  1/12/2022

## 2022-01-12 NOTE — PROGRESS NOTES
[facility]  Inpatient Progress Note    Reason for Follow-up: Elevated liver test, chronic anemia    Subjective     Interval History:   Postop day 2 laparoscopic cholecystectomy with Dr. Raygoza    Patient reports she is not doing well today.  She has not had a bowel movement since admission despite having regular diet prior to surgery and since surgery.  She states she is not passing gas.  She has ongoing right lower quadrant pain that is unchanged with p.o. intake.  She denies nausea, vomiting, fever.  She is tolerating p.o. intake but with decreased appetite.    1/12/2022 labs showed hemoglobin of 7.6 which is improved over hemoglobin yesterday of 6.1.  She did receive 2 units PRBC in between these labs.  , ALT 35, AST 56, bili 1.6-overall improving LFTs.    Current Facility-Administered Medications:   •  acetaminophen (TYLENOL) tablet 650 mg, 650 mg, Oral, Q4H PRN, Ramana Raygoza MD  •  amLODIPine (NORVASC) tablet 5 mg, 5 mg, Oral, Daily, Ramana Raygoza MD, 5 mg at 01/12/22 0855  •  aspirin EC tablet 81 mg, 81 mg, Oral, Daily, Ramana Raygoza MD, 81 mg at 01/12/22 0855  •  dextrose (D50W) (25 g/50 mL) IV injection 25 g, 25 g, Intravenous, Q15 Min PRN, Ramana Raygoza MD  •  dextrose (GLUTOSE) oral gel 15 g, 15 g, Oral, Q15 Min PRN, Ramana Raygoza MD  •  [START ON 1/14/2022] epoetin brooke-epbx (RETACRIT) injection 10,000 Units, 10,000 Units, Subcutaneous, Once per day on Mon Wed Fri, Ruthann Palmre MD  •  folic acid (FOLVITE) tablet 1 mg, 1 mg, Oral, Daily, Ramana Raygoza MD, 1 mg at 01/12/22 0855  •  furosemide (LASIX) tablet 40 mg, 40 mg, Oral, BID, Ramana Raygoza MD, 40 mg at 01/12/22 0855  •  glucagon (human recombinant) (GLUCAGEN DIAGNOSTIC) injection 1 mg, 1 mg, Subcutaneous, Q15 Min PRN, Ramana Raygoza MD  •  heparin (porcine) injection 4,000 Units, 4,000 Units, Intracatheter, PRN, Ramana Raygoza MD, 4,000 Units at 01/11/22 4879  •   HYDROcodone-acetaminophen (NORCO) 5-325 MG per tablet 1 tablet, 1 tablet, Oral, Q4H PRN, Ramana Raygoza MD, 1 tablet at 01/12/22 1247  •  insulin lispro (ADMELOG) injection 0-9 Units, 0-9 Units, Subcutaneous, TID With Meals, Ramana Raygoza MD, 4 Units at 01/12/22 1229  •  insulin lispro (ADMELOG) injection 3 Units, 3 Units, Subcutaneous, TID With Meals, Ramana Raygoza MD, 3 Units at 01/12/22 1229  •  levothyroxine (SYNTHROID, LEVOTHROID) tablet 125 mcg, 125 mcg, Oral, Daily, Ramana Raygoza MD, 125 mcg at 01/12/22 0855  •  melatonin tablet 3 mg, 3 mg, Oral, Nightly PRN, Ramaan Raygoza MD, 3 mg at 01/09/22 2014  •  metoprolol tartrate (LOPRESSOR) tablet 25 mg, 25 mg, Oral, BID, Rmaana Raygoza MD, 25 mg at 01/11/22 2043  •  multivitamin with minerals 1 tablet, 1 tablet, Oral, Daily, Ramana Raygoza MD, 1 tablet at 01/12/22 0855  •  nitroglycerin (NITROSTAT) SL tablet 0.4 mg, 0.4 mg, Sublingual, Q5 Min PRN, Ramana Raygoza MD  •  OLANZapine (zyPREXA) tablet 5 mg, 5 mg, Oral, BID, Ramana Raygoza MD, 5 mg at 01/12/22 0855  •  ondansetron (ZOFRAN) tablet 4 mg, 4 mg, Oral, Q6H PRN, 4 mg at 01/11/22 0310 **OR** ondansetron (ZOFRAN) injection 4 mg, 4 mg, Intravenous, Q6H PRN, Ramana Raygoza MD, 4 mg at 01/11/22 0919  •  pantoprazole (PROTONIX) EC tablet 40 mg, 40 mg, Oral, Daily, Ramana Raygoza MD, 40 mg at 01/12/22 0855  •  rOPINIRole (REQUIP) tablet 0.5 mg, 0.5 mg, Oral, Q12H, Ramana Raygoza MD, 0.5 mg at 01/12/22 0855  •  sertraline (ZOLOFT) tablet 100 mg, 100 mg, Oral, Daily, Ramana Raygoza MD, 100 mg at 01/12/22 0855  •  tamsulosin (FLOMAX) 24 hr capsule 0.4 mg, 0.4 mg, Oral, Daily, Ramana Raygoza MD, 0.4 mg at 01/12/22 0855  Review of Systems:    The following systems were reviewed and negative;  respiratory and cardiovascular    Objective     Vital Signs  Temp:  [97.1 °F (36.2 °C)-98.6 °F (37 °C)] 98.6 °F (37 °C)  Heart Rate:  [49-62] 50  Resp:  [14-16] 14  BP:  ()/(45-59) 109/58  Body mass index is 28.19 kg/m².    Intake/Output Summary (Last 24 hours) at 1/12/2022 1627  Last data filed at 1/12/2022 1300  Gross per 24 hour   Intake 540 ml   Output 2700 ml   Net -2160 ml     I/O this shift:  In: 240 [P.O.:240]  Out: -      Physical Exam:   General: patient awake, alert and cooperative   Eyes: Normal lids and lashes, no scleral icterus   Neck: supple, normal ROM   Skin: warm and dry, not jaundiced   Pulm: regular and unlabored   Abdomen: soft, diffusely tender, nondistended; decreased bowel sounds   Psychiatric: Normal mood and behavior; memory intact     Results Review:     I reviewed the patient's new clinical results.    Results from last 7 days   Lab Units 01/12/22  0808 01/11/22  0746 01/10/22  0626   WBC 10*3/mm3 13.84* 16.52* 10.91*   HEMOGLOBIN g/dL 7.6* 6.1* 8.2*   HEMATOCRIT % 24.0* 19.1* 26.0*   PLATELETS 10*3/mm3 196 264 233     Results from last 7 days   Lab Units 01/12/22  0808 01/11/22  0746 01/10/22  0626   SODIUM mmol/L 133* 134* 132*   POTASSIUM mmol/L 4.2 4.4 4.7   CHLORIDE mmol/L 98 99 96*   CO2 mmol/L 20.4* 18.5* 21.3*   BUN mg/dL 20 28* 51*   CREATININE mg/dL 2.53* 3.02* 4.03*   CALCIUM mg/dL 7.3* 7.6* 8.1*   BILIRUBIN mg/dL 1.6* 2.5* 3.8*   ALK PHOS U/L 525* 608* 811*   ALT (SGPT) U/L 35* 41* 35*   AST (SGOT) U/L 56* 86* 47*   GLUCOSE mg/dL 241* 160* 196*         Lab Results   Lab Value Date/Time    LIPASE 21 12/19/2021 1314       Radiology:  XR Abdomen KUB   Final Result       As described.       This report was finalized on 1/12/2022 3:37 PM by Dr. Jared Kern M.D.          FL Cholangiogram Operative   Final Result       As described.       This report was finalized on 1/10/2022 7:44 PM by Dr. Jared Kern M.D.           Liver   Final Result       1. There is no intra or extrahepatic biliary dilatation.   2. The patient's contracted gallbladder is filled with stones. There is   some gallbladder wall thickening, although this  may be related to   incomplete distention. Pericholecystic fluid is also seen, another   nonspecific finding, which can be associated with etiologies other than   acute cholecystitis, including hepatitis and right-sided heart failure.   Correlation with clinical presentation is recommended. HIDA scan could   be considered for further assessment.   3. There is a somewhat echogenic appearance to the portal triads.   Appearance can be associated with hepatitis.   4. Sonographer measures the pancreatic duct and up to 4 mm. No obvious   obstructing lesion is seen. Patient's pancreas appeared atrophic on the   earlier CT. MRCP or ERCP could be considered for further assessment.       This report was finalized on 1/9/2022 10:51 PM by Dr. Cherri Rodriguez M.D.          CT Abdomen Pelvis Without Contrast   Final Result          Assessment/Plan     Patient Active Problem List   Diagnosis   • MAYUR (acute kidney injury) (HCC)   • HTN (hypertension)   • Hypothyroidism   • Type 2 diabetes mellitus with hyperglycemia, with long-term current use of insulin (HCC)   • Rheumatoid arthritis (HCC)   • Angioedema   • GERD (gastroesophageal reflux disease)   • Anemia   • Medically noncompliant   • Myocardial infarction due to demand ischemia (HCC)   • Enteritis   • PRES (posterior reversible encephalopathy syndrome)   • Urine retention   • Klebsiella infection   • Superficial thrombophlebitis   • Generalized weakness   • ESRD (end stage renal disease) (HCC)   • CAD (coronary artery disease)   • Abnormal urinalysis   • Acute on chronic diastolic CHF (congestive heart failure) (HCC)   • Pyelonephritis   • Calculus of gallbladder with acute on chronic cholecystitis without obstruction   • Pleural effusion on right   • Anemia due to chronic kidney disease, on chronic dialysis (HCC)       Assessment:  1. Elevated liver enzymes, likely due to biliary colic, slowly improving  2. Anemia with chronicity however worsening yesterday requiring 2  units PRBC  3. Cholelithiasis status post op day 2 cholecystectomy with normal IOC  4. RLQ pain, persistent and very focused on this today.  5. ESRD on HD  6. Constipation      Plan:  · Check hemoglobin now to discern trend after receiving blood last night.  · Continue to trend LFTs  · No bowel movement since admission without passing gas today, we will order KUB,     I discussed the patients findings and my recommendations with patient and nursing staff.    Dragon dictation used throughout this note.            Venessa Canas PA-C  [facility]  [address] [place] [address]  Office: [phone]

## 2022-01-13 LAB
ALBUMIN SERPL-MCNC: 2.5 G/DL (ref 3.5–5.2)
ALBUMIN/GLOB SERPL: 0.8 G/DL
ALP SERPL-CCNC: 692 U/L (ref 39–117)
ALT SERPL W P-5'-P-CCNC: 37 U/L (ref 1–33)
ANION GAP SERPL CALCULATED.3IONS-SCNC: 15 MMOL/L (ref 5–15)
AST SERPL-CCNC: 58 U/L (ref 1–32)
BASOPHILS # BLD AUTO: 0.05 10*3/MM3 (ref 0–0.2)
BASOPHILS NFR BLD AUTO: 0.4 % (ref 0–1.5)
BILIRUB SERPL-MCNC: 2.4 MG/DL (ref 0–1.2)
BUN SERPL-MCNC: 28 MG/DL (ref 6–20)
BUN/CREAT SERPL: 7.6 (ref 7–25)
CALCIUM SPEC-SCNC: 7.7 MG/DL (ref 8.6–10.5)
CHLORIDE SERPL-SCNC: 98 MMOL/L (ref 98–107)
CO2 SERPL-SCNC: 20 MMOL/L (ref 22–29)
CREAT SERPL-MCNC: 3.68 MG/DL (ref 0.57–1)
DEPRECATED RDW RBC AUTO: 51.2 FL (ref 37–54)
EOSINOPHIL # BLD AUTO: 0.26 10*3/MM3 (ref 0–0.4)
EOSINOPHIL NFR BLD AUTO: 2.1 % (ref 0.3–6.2)
ERYTHROCYTE [DISTWIDTH] IN BLOOD BY AUTOMATED COUNT: 16.4 % (ref 12.3–15.4)
GFR SERPL CREATININE-BSD FRML MDRD: 13 ML/MIN/1.73
GFR SERPL CREATININE-BSD FRML MDRD: ABNORMAL ML/MIN/{1.73_M2}
GLOBULIN UR ELPH-MCNC: 3.3 GM/DL
GLUCOSE BLDC GLUCOMTR-MCNC: 167 MG/DL (ref 70–130)
GLUCOSE BLDC GLUCOMTR-MCNC: 172 MG/DL (ref 70–130)
GLUCOSE SERPL-MCNC: 167 MG/DL (ref 65–99)
HCT VFR BLD AUTO: 24.4 % (ref 34–46.6)
HGB BLD-MCNC: 8 G/DL (ref 12–15.9)
IMM GRANULOCYTES # BLD AUTO: 0.07 10*3/MM3 (ref 0–0.05)
IMM GRANULOCYTES NFR BLD AUTO: 0.6 % (ref 0–0.5)
LYMPHOCYTES # BLD AUTO: 2.07 10*3/MM3 (ref 0.7–3.1)
LYMPHOCYTES NFR BLD AUTO: 17 % (ref 19.6–45.3)
MCH RBC QN AUTO: 29.1 PG (ref 26.6–33)
MCHC RBC AUTO-ENTMCNC: 32.8 G/DL (ref 31.5–35.7)
MCV RBC AUTO: 88.7 FL (ref 79–97)
MONOCYTES # BLD AUTO: 1.45 10*3/MM3 (ref 0.1–0.9)
MONOCYTES NFR BLD AUTO: 11.9 % (ref 5–12)
NEUTROPHILS NFR BLD AUTO: 68 % (ref 42.7–76)
NEUTROPHILS NFR BLD AUTO: 8.3 10*3/MM3 (ref 1.7–7)
NRBC BLD AUTO-RTO: 0.1 /100 WBC (ref 0–0.2)
PLATELET # BLD AUTO: 223 10*3/MM3 (ref 140–450)
PMV BLD AUTO: 11.9 FL (ref 6–12)
POTASSIUM SERPL-SCNC: 4.5 MMOL/L (ref 3.5–5.2)
PROT SERPL-MCNC: 5.8 G/DL (ref 6–8.5)
RBC # BLD AUTO: 2.75 10*6/MM3 (ref 3.77–5.28)
SODIUM SERPL-SCNC: 133 MMOL/L (ref 136–145)
WBC NRBC COR # BLD: 12.2 10*3/MM3 (ref 3.4–10.8)

## 2022-01-13 PROCEDURE — 82962 GLUCOSE BLOOD TEST: CPT

## 2022-01-13 PROCEDURE — 99231 SBSQ HOSP IP/OBS SF/LOW 25: CPT | Performed by: INTERNAL MEDICINE

## 2022-01-13 PROCEDURE — 85025 COMPLETE CBC W/AUTO DIFF WBC: CPT | Performed by: SURGERY

## 2022-01-13 PROCEDURE — 63710000001 ONDANSETRON PER 8 MG: Performed by: SURGERY

## 2022-01-13 PROCEDURE — 63710000001 INSULIN LISPRO (HUMAN) PER 5 UNITS: Performed by: SURGERY

## 2022-01-13 PROCEDURE — 99024 POSTOP FOLLOW-UP VISIT: CPT | Performed by: SURGERY

## 2022-01-13 PROCEDURE — 80053 COMPREHEN METABOLIC PANEL: CPT | Performed by: SURGERY

## 2022-01-13 RX ORDER — HYDROCODONE BITARTRATE AND ACETAMINOPHEN 5; 325 MG/1; MG/1
1 TABLET ORAL EVERY 6 HOURS PRN
Qty: 20 TABLET | Refills: 0 | Status: SHIPPED | OUTPATIENT
Start: 2022-01-13 | End: 2022-07-22 | Stop reason: HOSPADM

## 2022-01-13 RX ADMIN — ACETAMINOPHEN 650 MG: 325 TABLET, FILM COATED ORAL at 21:50

## 2022-01-13 RX ADMIN — HYDROCODONE BITARTRATE AND ACETAMINOPHEN 1 TABLET: 5; 325 TABLET ORAL at 06:59

## 2022-01-13 RX ADMIN — INSULIN LISPRO 2 UNITS: 100 INJECTION, SOLUTION INTRAVENOUS; SUBCUTANEOUS at 11:29

## 2022-01-13 RX ADMIN — FOLIC ACID 1 MG: 1 TABLET ORAL at 08:24

## 2022-01-13 RX ADMIN — INSULIN LISPRO 3 UNITS: 100 INJECTION, SOLUTION INTRAVENOUS; SUBCUTANEOUS at 08:25

## 2022-01-13 RX ADMIN — FUROSEMIDE 40 MG: 40 TABLET ORAL at 08:24

## 2022-01-13 RX ADMIN — ROPINIROLE HYDROCHLORIDE 0.5 MG: 0.5 TABLET, FILM COATED ORAL at 21:45

## 2022-01-13 RX ADMIN — OLANZAPINE 5 MG: 5 TABLET ORAL at 21:44

## 2022-01-13 RX ADMIN — LEVOTHYROXINE SODIUM 125 MCG: 0.12 TABLET ORAL at 08:24

## 2022-01-13 RX ADMIN — MULTIPLE VITAMINS W/ MINERALS TAB 1 TABLET: TAB at 08:24

## 2022-01-13 RX ADMIN — OLANZAPINE 5 MG: 5 TABLET ORAL at 08:24

## 2022-01-13 RX ADMIN — INSULIN LISPRO 2 UNITS: 100 INJECTION, SOLUTION INTRAVENOUS; SUBCUTANEOUS at 08:24

## 2022-01-13 RX ADMIN — SERTRALINE 100 MG: 100 TABLET, FILM COATED ORAL at 08:24

## 2022-01-13 RX ADMIN — ONDANSETRON HYDROCHLORIDE 4 MG: 4 TABLET, FILM COATED ORAL at 21:44

## 2022-01-13 RX ADMIN — ROPINIROLE HYDROCHLORIDE 0.5 MG: 0.5 TABLET, FILM COATED ORAL at 08:24

## 2022-01-13 RX ADMIN — PANTOPRAZOLE SODIUM 40 MG: 40 TABLET, DELAYED RELEASE ORAL at 08:24

## 2022-01-13 RX ADMIN — METOPROLOL TARTRATE 25 MG: 25 TABLET, FILM COATED ORAL at 21:45

## 2022-01-13 RX ADMIN — ACETAMINOPHEN 650 MG: 325 TABLET, FILM COATED ORAL at 02:03

## 2022-01-13 RX ADMIN — FUROSEMIDE 40 MG: 40 TABLET ORAL at 18:01

## 2022-01-13 RX ADMIN — TAMSULOSIN HYDROCHLORIDE 0.4 MG: 0.4 CAPSULE ORAL at 08:24

## 2022-01-13 RX ADMIN — HYDROCODONE BITARTRATE AND ACETAMINOPHEN 1 TABLET: 5; 325 TABLET ORAL at 14:13

## 2022-01-13 RX ADMIN — AMLODIPINE BESYLATE 5 MG: 5 TABLET ORAL at 08:24

## 2022-01-13 RX ADMIN — METOPROLOL TARTRATE 25 MG: 25 TABLET, FILM COATED ORAL at 08:24

## 2022-01-13 RX ADMIN — ASPIRIN 81 MG: 81 TABLET, COATED ORAL at 08:24

## 2022-01-13 RX ADMIN — ACETAMINOPHEN 650 MG: 325 TABLET, FILM COATED ORAL at 05:58

## 2022-01-13 RX ADMIN — INSULIN LISPRO 3 UNITS: 100 INJECTION, SOLUTION INTRAVENOUS; SUBCUTANEOUS at 11:29

## 2022-01-13 NOTE — PROGRESS NOTES
Name: Zaina Martinez ADMIT: 2022   : 1965  PCP: Karson Oreilly MD    MRN: [id number] LOS: 4 days   AGE/SEX: 56 y.o. female  ROOM: [facility]     Subjective   Subjective    S/p fannie  Quite sedated.  Received Norco and her Zyprexa this morning.  Cannot stay awake for interview.    Review of Systems     Objective   Objective   Vital Signs  Temp:  [97.7 °F (36.5 °C)-98.6 °F (37 °C)] 97.7 °F (36.5 °C)  Heart Rate:  [50-53] 50  Resp:  [14-18] 16  BP: (106-161)/(53-78) 161/78  SpO2:  [86 %-99 %] 97 %  on  Flow (L/min):  [2] 2;   Device (Oxygen Therapy): nasal cannula  Body mass index is 28.1 kg/m².    Intake/Output Summary (Last 24 hours) at 2022 1027  Last data filed at 2022 0800  Gross per 24 hour   Intake 480 ml   Output --   Net 480 ml   Net IO Since Admission: -5,045 mL [22 1027]   Wt Readings from Last 1 Encounters:   22 0500 69.7 kg (153 lb 10.6 oz)   22 0500 69.9 kg (154 lb 1.6 oz)   22 0500 86.3 kg (190 lb 4.1 oz)   01/10/22 0700 82.3 kg (181 lb 8 oz)   22 1808 77.6 kg (171 lb)   22 1006 79.4 kg (175 lb)     Wt Readings from Last 3 Encounters:   22 69.7 kg (153 lb 10.6 oz)   21 71.8 kg (158 lb 3.2 oz)   21 69.9 kg (154 lb)       Physical Exam  Vitals and nursing note reviewed.   Constitutional:       General: She is not in acute distress.     Appearance: She is ill-appearing.   Neck:      Vascular: No JVD.      Trachea: No tracheal deviation.   Cardiovascular:      Rate and Rhythm: Normal rate and regular rhythm.      Heart sounds: Normal heart sounds.   Pulmonary:      Effort: Pulmonary effort is normal. No respiratory distress.      Breath sounds: Normal breath sounds.   Abdominal:      General: Bowel sounds are normal.      Palpations: Abdomen is soft.      Tenderness: There is no abdominal tenderness.   Musculoskeletal:      Right lower leg: Edema present.      Left lower leg: Edema present.   Skin:     General: Skin is warm and dry.       Coloration: Skin is pale.   Neurological:      General: No focal deficit present.      Mental Status: She is alert and oriented to person, place, and time.   Psychiatric:         Attention and Perception: She is inattentive.         Cognition and Memory: Cognition is impaired.         Results Review     I reviewed the patient's new clinical results.  Results from last 7 days   Lab Units 01/13/22  0643 01/12/22  1728 01/12/22  0808 01/11/22  0746 01/10/22  0626 01/10/22  0626   WBC 10*3/mm3 12.20*  --  13.84* 16.52*  --  10.91*   HEMOGLOBIN g/dL 8.0* 7.7* 7.6* 6.1*   < > 8.2*   PLATELETS 10*3/mm3 223  --  196 264  --  233    < > = values in this interval not displayed.     Results from last 7 days   Lab Units 01/13/22  0643 01/12/22  0808 01/11/22  0746 01/10/22  0626 01/09/22  0946   SODIUM mmol/L 133* 133* 134* 132* 135*   POTASSIUM mmol/L 4.5 4.2 4.4 4.7 4.6   CHLORIDE mmol/L 98 98 99 96* 96*   CO2 mmol/L 20.0* 20.4* 18.5* 21.3* 22.7   BUN mg/dL 28* 20 28* 51* 45*   CREATININE mg/dL 3.68* 2.53* 3.02* 4.03* 3.57*   GLUCOSE mg/dL 167* 241* 160* 196* 217*   EGFR IF NONAFRICN AM mL/min/1.73 13* 20* 16* 11* 13*   ALBUMIN g/dL 2.50* 2.20* 2.30* 2.60* 2.90*   BILIRUBIN mg/dL 2.4* 1.6* 2.5* 3.8* 5.6*   ALK PHOS U/L 692* 525* 608* 811* 1,021*   AST (SGOT) U/L 58* 56* 86* 47* 77*   ALT (SGPT) U/L 37* 35* 41* 35* 46*     Results from last 7 days   Lab Units 01/13/22  0643 01/12/22  0808 01/11/22  0746 01/10/22  0626   CALCIUM mg/dL 7.7* 7.3* 7.6* 8.1*   ALBUMIN g/dL 2.50* 2.20* 2.30* 2.60*     Glucose   Date/Time Value Ref Range Status   01/13/2022 0607 167 (H) 70 - 130 mg/dL Final     Comment:     Meter: [id number] : [id number] [facility]   01/12/2022 2102 180 (H) 70 - 130 mg/dL Final     Comment:     Meter: [id number] : [id number] [facility]   01/12/2022 1631 216 (H) 70 - 130 mg/dL Final     Comment:     Meter: [id number] : [id number] [facility]   01/12/2022 1155 206 (H) 70 - 130 mg/dL Final      Comment:     Meter: [id number] : [id number] Nora Landaverde RN   01/12/2022 0618 215 (H) 70 - 130 mg/dL Final     Comment:     Meter: [id number] : [id number] Sunni Duenas NA   01/11/2022 2152 165 (H) 70 - 130 mg/dL Final     Comment:     Meter: [id number] : [id number] Sunni Duenas NA   01/11/2022 1054 147 (H) 70 - 130 mg/dL Final     Comment:     Meter: [id number] : [id number] Domo Crawford NA     EEG  Addendum: Correction to Hyperventilation and photic stimulation.  Hyperventilation  was not performed.  Photic stimulation was performed but did not elicit a  robust driving response or an abnormal driving response.  Narrative: Date of onset: 12/3/21 0855  Date of offset: 12/3/21 0922    Indication: altered mental status    Technical description:  This is a 21 channel digital EEG recording that   began on 0855 and ended on 0922.  Jesus and seizure detection software   programs were used.  There is significant artifact at P4 limiting interpretation of that area   throughout the study.  There are also occasional periods of generalized   muscle artifact limit interpretation during those periods.  Background:  A posterior dominant alpha rhythm is not present.  The   patient enters the drowsy state during the record.  The anterior   background is predominantly theta with occasional underlying delta.   hyperventilation and photic stimulation were not performed.  There are no   asymmetries between the two hemispheres.  No interictal activity is   present.    The EKG monitor shows a heart rate that varies between 50 and 70 beats per   minute.    Clinical Interpretation: Somewhat technically limited study due to muscle   artifact and P4 artifact.  With this in mind, this routine EEG recording   is abnormal with diffuse generalized slowing consistent with a mild to   moderate encephalopathy.  No potentially epileptogenic activity, seizure   activity, or focal slowing is present given technical limtations.  Clinical   correlation recommended.  XR Abdomen KUB  Narrative: XR ABDOMEN KUB-     INDICATIONS: Abdominal pain     TECHNIQUE: Supine views of the abdomen     COMPARISON:  image from CT from 01/09/2022     FINDINGS:      The bowel gas pattern is nonobstructive. No supine evidence for free  intraperitoneal gas. Surgical clips are seen at the right upper  quadrant. No definite nephrolithiasis. Follow-up/further evaluation can  be obtained as indications persist.           Impression:    As described.     This report was finalized on 1/12/2022 3:37 PM by Dr. Jared Kern M.D.         Scheduled Medications  amLODIPine, 5 mg, Oral, Daily  aspirin, 81 mg, Oral, Daily  [START ON 1/14/2022] epoetin brooke/brooke-epbx, 10,000 Units, Subcutaneous, Once per day on Mon Wed Fri  folic acid, 1 mg, Oral, Daily  furosemide, 40 mg, Oral, BID  insulin lispro, 0-9 Units, Subcutaneous, TID With Meals  insulin lispro, 3 Units, Subcutaneous, TID With Meals  levothyroxine, 125 mcg, Oral, Daily  metoprolol tartrate, 25 mg, Oral, BID  multivitamin with minerals, 1 tablet, Oral, Daily  OLANZapine, 5 mg, Oral, BID  pantoprazole, 40 mg, Oral, Daily  rOPINIRole, 0.5 mg, Oral, Q12H  sertraline, 100 mg, Oral, Daily  tamsulosin, 0.4 mg, Oral, Daily    Infusions   Diet  Diet Regular; GI Soft, Consistent Carbohydrate       Assessment/Plan     Active Hospital Problems    Diagnosis  POA   • **Calculus of gallbladder with acute on chronic cholecystitis without obstruction [K80.12]  Yes   • Anemia due to chronic kidney disease, on chronic dialysis ([facility]) [N18.6, D63.1, Z99.2]  Not Applicable   • Pyelonephritis [N12]  Yes   • Pleural effusion on right [J90]  Yes   • Acute on chronic diastolic CHF (congestive heart failure) ([facility]) [I50.33]  Yes   • ESRD (end stage renal disease) ([facility]) [N18.6]  Yes   • CAD (coronary artery disease) [I25.10]  Yes   • HTN (hypertension) [I10]  Yes   • Type 2 diabetes mellitus with hyperglycemia, with long-term current  use of insulin (HCC) [E11.65, Z79.4]  Not Applicable   • Rheumatoid arthritis (HCC) [M06.9]  Yes      Resolved Hospital Problems   No resolved problems to display.       56 y.o. female admitted with Calculus of gallbladder with acute on chronic cholecystitis without obstruction.    She is s/p Laparoscopic cholecystectomy with intraoperative cholangiogram    LFTs still elevated.  Otherwise seems stable postoperatively.  Not sure why she is encephalopathic.  She should receive dialysis today.  Hemoglobin better after transfusion.  Receiving Retacrit.  Anticipate remain hospitalized today.  Could possibly discharge tomorrow if all agree.  It seems patient does not wish to return to [facility].  CCP to review with patient.      · SCDs for DVT prophylaxis.  · Full code.  · Discussed with patient.  · Anticipate discharge to SNU facility in 1-2 days.      Fran Yo MD  [facility]  01/13/22  10:27 EST

## 2022-01-13 NOTE — CASE MANAGEMENT/SOCIAL WORK
Continued Stay Note  [facility]     Patient Name: Zaina Martinez  MRN: [id number]  Today's Date: 1/13/2022    Admit Date: 1/9/2022     Discharge Plan     Row Name 01/13/22 1443       Plan    Plan Plans are to dc home with spouse and HH.  Pt will be going to HD @ [facility] [address] MWF @ [address].  Clinic will need to know start date, pt will be changing from TTS to MWF.  Ruth BARNES RN/CCP               Discharge Codes    No documentation.               Expected Discharge Date and Time     Expected Discharge Date Expected Discharge Time    Jan 13, 2022             Ruth Huetra RN

## 2022-01-13 NOTE — SIGNIFICANT NOTE
01/13/22 1617   OTHER   Discipline physical therapy assistant   Rehab Time/Intention   Session Not Performed patient unavailable for treatment  (pt off floor for dialysis; PT will f/u tomorrow)   Recommendation   PT - Next Appointment 01/14/22

## 2022-01-13 NOTE — PROGRESS NOTES
Postop day 3 cholecystectomy    Feels better overall, pain is more incisional now.  Tolerating a diet.    Objective:  Afebrile, vitals are stable  General: Awake and alert, no distress  Eyes: No jaundice  Abdomen: Softer, less tender today, dressings are clean    Assessment and plan:  -Postop day 3 after cholecystectomy for acute cholecystitis  -Hemoglobin is stable  -Okay for discharge from my standpoint when ready, follow-up with me in the office 2 weeks    Ramana Raygoza MD  General and Endoscopic Surgery  [facility], [facility]    [address] [place] [address], [address]  [place], [place], [address]  P: [phone]  F: [phone]

## 2022-01-13 NOTE — PROGRESS NOTES
[facility]  Inpatient Progress Note    Reason for Follow Up: Elevated LFTs, chronic anemia    Subjective     Interval History:   H&H stable, alk phos and bilirubin still elevated.  KUB from yesterday showed nonobstructive bowel gas pattern.  Patient reports passing flatus today, not yet having any bowel movement.  Tolerating p.o. well.  She does note some postoperative soreness.    Current Facility-Administered Medications:   •  acetaminophen (TYLENOL) tablet 650 mg, 650 mg, Oral, Q4H PRN, Ramana Raygoza MD, 650 mg at 01/13/22 0558  •  amLODIPine (NORVASC) tablet 5 mg, 5 mg, Oral, Daily, Ramana Raygoza MD, 5 mg at 01/13/22 0824  •  aspirin EC tablet 81 mg, 81 mg, Oral, Daily, Ramana Raygoza MD, 81 mg at 01/13/22 0824  •  dextrose (D50W) (25 g/50 mL) IV injection 25 g, 25 g, Intravenous, Q15 Min PRN, Ramana Raygoza MD  •  dextrose (GLUTOSE) oral gel 15 g, 15 g, Oral, Q15 Min PRN, Ramana Raygoza MD  •  [START ON 1/14/2022] epoetin brooke-epbx (RETACRIT) injection 10,000 Units, 10,000 Units, Subcutaneous, Once per day on Mon Wed Fri, Ruthann Palmer MD  •  folic acid (FOLVITE) tablet 1 mg, 1 mg, Oral, Daily, Ramana Raygoza MD, 1 mg at 01/13/22 0824  •  furosemide (LASIX) tablet 40 mg, 40 mg, Oral, BID, Ramana Raygoza MD, 40 mg at 01/13/22 0824  •  glucagon (human recombinant) (GLUCAGEN DIAGNOSTIC) injection 1 mg, 1 mg, Subcutaneous, Q15 Min PRN, Ramana Raygoza MD  •  heparin (porcine) injection 4,000 Units, 4,000 Units, Intracatheter, PRN, Ramana Raygoza MD, 4,000 Units at 01/11/22 3869  •  HYDROcodone-acetaminophen (NORCO) 5-325 MG per tablet 1 tablet, 1 tablet, Oral, Q4H PRN, Ramana Raygoza MD, 1 tablet at 01/13/22 0659  •  insulin lispro (ADMELOG) injection 0-9 Units, 0-9 Units, Subcutaneous, TID With Meals, Ramana Raygoza MD, 2 Units at 01/13/22 1129  •  insulin lispro (ADMELOG) injection 3 Units, 3 Units, Subcutaneous, TID With Meals, Jaret,  Ramana BOWERS MD, 3 Units at 01/13/22 1129  •  levothyroxine (SYNTHROID, LEVOTHROID) tablet 125 mcg, 125 mcg, Oral, Daily, Ramana Raygoza MD, 125 mcg at 01/13/22 0824  •  melatonin tablet 3 mg, 3 mg, Oral, Nightly PRN, Ramana Raygoza MD, 3 mg at 01/12/22 2127  •  metoprolol tartrate (LOPRESSOR) tablet 25 mg, 25 mg, Oral, BID, Ramana Raygoza MD, 25 mg at 01/13/22 0824  •  multivitamin with minerals 1 tablet, 1 tablet, Oral, Daily, Ramana Raygoza MD, 1 tablet at 01/13/22 0824  •  nitroglycerin (NITROSTAT) SL tablet 0.4 mg, 0.4 mg, Sublingual, Q5 Min PRN, Ramana Raygoza MD  •  OLANZapine (zyPREXA) tablet 5 mg, 5 mg, Oral, BID, Ramana Raygoza MD, 5 mg at 01/13/22 0824  •  ondansetron (ZOFRAN) tablet 4 mg, 4 mg, Oral, Q6H PRN, 4 mg at 01/11/22 0310 **OR** ondansetron (ZOFRAN) injection 4 mg, 4 mg, Intravenous, Q6H PRN, Ramana Raygoza MD, 4 mg at 01/11/22 0919  •  pantoprazole (PROTONIX) EC tablet 40 mg, 40 mg, Oral, Daily, Ramana Raygoza MD, 40 mg at 01/13/22 0824  •  rOPINIRole (REQUIP) tablet 0.5 mg, 0.5 mg, Oral, Q12H, Ramana Raygoza MD, 0.5 mg at 01/13/22 0824  •  sertraline (ZOLOFT) tablet 100 mg, 100 mg, Oral, Daily, Ramana Raygoza MD, 100 mg at 01/13/22 0824  •  tamsulosin (FLOMAX) 24 hr capsule 0.4 mg, 0.4 mg, Oral, Daily, Ramana Raygoza MD, 0.4 mg at 01/13/22 0824  Review of Systems:    All systems were reviewed and negative except for:  Gastrointestinal: positive for  See HPI    Objective     Vital Signs  Temp:  [97.7 °F (36.5 °C)-98.6 °F (37 °C)] 97.7 °F (36.5 °C)  Heart Rate:  [50-53] 50  Resp:  [14-18] 16  BP: (106-161)/(53-78) 161/78  Body mass index is 28.1 kg/m².    Intake/Output Summary (Last 24 hours) at 1/13/2022 1129  Last data filed at 1/13/2022 0800  Gross per 24 hour   Intake 480 ml   Output --   Net 480 ml     I/O this shift:  In: 240 [P.O.:240]  Out: -      Physical Exam:   General: patient awake, alert and cooperative   Eyes: Normal lids and lashes, no  scleral icterus   Neck: supple, normal ROM   Skin: warm and dry, not jaundiced   Cardiovascular: regular rhythm and rate, no murmurs auscultated   Pulm: clear to auscultation bilaterally, regular and unlabored   Abdomen: soft, nontender, nondistended; normal bowel sounds   Extremities: no rash or edema   Psychiatric: Normal mood and behavior; memory intact     Results Review:     I reviewed the patient's new clinical results.    Results from last 7 days   Lab Units 01/13/22  0643 01/12/22  1728 01/12/22  0808 01/11/22  0746 01/11/22  0746   WBC 10*3/mm3 12.20*  --  13.84*  --  16.52*   HEMOGLOBIN g/dL 8.0* 7.7* 7.6*   < > 6.1*   HEMATOCRIT % 24.4* 22.8* 24.0*   < > 19.1*   PLATELETS 10*3/mm3 223  --  196  --  264    < > = values in this interval not displayed.     Results from last 7 days   Lab Units 01/13/22  0643 01/12/22  0808 01/11/22  0746   SODIUM mmol/L 133* 133* 134*   POTASSIUM mmol/L 4.5 4.2 4.4   CHLORIDE mmol/L 98 98 99   CO2 mmol/L 20.0* 20.4* 18.5*   BUN mg/dL 28* 20 28*   CREATININE mg/dL 3.68* 2.53* 3.02*   CALCIUM mg/dL 7.7* 7.3* 7.6*   BILIRUBIN mg/dL 2.4* 1.6* 2.5*   ALK PHOS U/L 692* 525* 608*   ALT (SGPT) U/L 37* 35* 41*   AST (SGOT) U/L 58* 56* 86*   GLUCOSE mg/dL 167* 241* 160*         Lab Results   Lab Value Date/Time    LIPASE 21 12/19/2021 1314       Radiology:  XR Abdomen KUB   Final Result       As described.       This report was finalized on 1/12/2022 3:37 PM by Dr. Jared Kern M.D.          FL Cholangiogram Operative   Final Result       As described.       This report was finalized on 1/10/2022 7:44 PM by Dr. Jared Kern M.D.          US Liver   Final Result       1. There is no intra or extrahepatic biliary dilatation.   2. The patient's contracted gallbladder is filled with stones. There is   some gallbladder wall thickening, although this may be related to   incomplete distention. Pericholecystic fluid is also seen, another   nonspecific finding, which can be  associated with etiologies other than   acute cholecystitis, including hepatitis and right-sided heart failure.   Correlation with clinical presentation is recommended. HIDA scan could   be considered for further assessment.   3. There is a somewhat echogenic appearance to the portal triads.   Appearance can be associated with hepatitis.   4. Sonographer measures the pancreatic duct and up to 4 mm. No obvious   obstructing lesion is seen. Patient's pancreas appeared atrophic on the   earlier CT. MRCP or ERCP could be considered for further assessment.       This report was finalized on 1/9/2022 10:51 PM by Dr. Cherri Rodriguez M.D.          CT Abdomen Pelvis Without Contrast   Final Result          Assessment/Plan     Patient Active Problem List   Diagnosis   • MAYUR (acute kidney injury) (HCC)   • HTN (hypertension)   • Hypothyroidism   • Type 2 diabetes mellitus with hyperglycemia, with long-term current use of insulin (HCC)   • Rheumatoid arthritis (HCC)   • Angioedema   • GERD (gastroesophageal reflux disease)   • Anemia   • Medically noncompliant   • Myocardial infarction due to demand ischemia (HCC)   • Enteritis   • PRES (posterior reversible encephalopathy syndrome)   • Urine retention   • Klebsiella infection   • Superficial thrombophlebitis   • Generalized weakness   • ESRD (end stage renal disease) (HCC)   • CAD (coronary artery disease)   • Abnormal urinalysis   • Acute on chronic diastolic CHF (congestive heart failure) (HCC)   • Pyelonephritis   • Calculus of gallbladder with acute on chronic cholecystitis without obstruction   • Pleural effusion on right   • Anemia due to chronic kidney disease, on chronic dialysis (HCC)       Assessment:  1. Elevated LFTs: Mild increase likely associated with recent surgical intervention  2. Chronic anemia: Currently H&H is stable posttransfusion  3. Cholelithiasis: Day 3 cholecystectomy with normal IOC  4. Right lower quadrant pain: Improving  5. End-stage renal  disease on hemodialysis  6. Constipation      Plan:  · Encourage p.o. intake  · Continue to monitor H&H and LFTs  I discussed the patients findings and my recommendations with patient.    Frederic Romero MD

## 2022-01-13 NOTE — NURSING NOTE
Dialysis complete removed 3 liters with this treatment, no complications noted and vital signs are in epic.

## 2022-01-13 NOTE — PLAN OF CARE
Goal Outcome Evaluation:  Plan of Care Reviewed With: patient        Progress: improving     Patient continues to be alert and oriented. She requested pain medication before departure to dialysis unit for abdomen pain. No distress noted. HR continues in 40-50s. Oxygen at 2L while sleeping as needed. She is to be discharged home possibly tomorrow She is currently there now.   Meds to bed delivered medications to bedside while patient was at dialysis.

## 2022-01-14 LAB
ALBUMIN SERPL-MCNC: 2.3 G/DL (ref 3.5–5.2)
ALBUMIN/GLOB SERPL: 0.6 G/DL
ALP SERPL-CCNC: 752 U/L (ref 39–117)
ALT SERPL W P-5'-P-CCNC: 35 U/L (ref 1–33)
AMMONIA BLD-SCNC: 26 UMOL/L (ref 11–51)
ANION GAP SERPL CALCULATED.3IONS-SCNC: 10.6 MMOL/L (ref 5–15)
AST SERPL-CCNC: 61 U/L (ref 1–32)
BILIRUB SERPL-MCNC: 2.8 MG/DL (ref 0–1.2)
BUN SERPL-MCNC: 23 MG/DL (ref 6–20)
BUN/CREAT SERPL: 7.2 (ref 7–25)
CALCIUM SPEC-SCNC: 8 MG/DL (ref 8.6–10.5)
CHLORIDE SERPL-SCNC: 99 MMOL/L (ref 98–107)
CO2 SERPL-SCNC: 21.4 MMOL/L (ref 22–29)
CREAT SERPL-MCNC: 3.2 MG/DL (ref 0.57–1)
DEPRECATED RDW RBC AUTO: 52.1 FL (ref 37–54)
ERYTHROCYTE [DISTWIDTH] IN BLOOD BY AUTOMATED COUNT: 16.4 % (ref 12.3–15.4)
GFR SERPL CREATININE-BSD FRML MDRD: 15 ML/MIN/1.73
GLOBULIN UR ELPH-MCNC: 3.7 GM/DL
GLUCOSE BLDC GLUCOMTR-MCNC: 144 MG/DL (ref 70–130)
GLUCOSE BLDC GLUCOMTR-MCNC: 158 MG/DL (ref 70–130)
GLUCOSE BLDC GLUCOMTR-MCNC: 177 MG/DL (ref 70–130)
GLUCOSE BLDC GLUCOMTR-MCNC: 201 MG/DL (ref 70–130)
GLUCOSE SERPL-MCNC: 175 MG/DL (ref 65–99)
HCT VFR BLD AUTO: 25 % (ref 34–46.6)
HGB BLD-MCNC: 8.2 G/DL (ref 12–15.9)
MCH RBC QN AUTO: 29.2 PG (ref 26.6–33)
MCHC RBC AUTO-ENTMCNC: 32.8 G/DL (ref 31.5–35.7)
MCV RBC AUTO: 89 FL (ref 79–97)
PLATELET # BLD AUTO: 244 10*3/MM3 (ref 140–450)
PMV BLD AUTO: 11.2 FL (ref 6–12)
POTASSIUM SERPL-SCNC: 4.6 MMOL/L (ref 3.5–5.2)
PROT SERPL-MCNC: 6 G/DL (ref 6–8.5)
RBC # BLD AUTO: 2.81 10*6/MM3 (ref 3.77–5.28)
SODIUM SERPL-SCNC: 131 MMOL/L (ref 136–145)
WBC NRBC COR # BLD: 11.52 10*3/MM3 (ref 3.4–10.8)

## 2022-01-14 PROCEDURE — 82140 ASSAY OF AMMONIA: CPT | Performed by: HOSPITALIST

## 2022-01-14 PROCEDURE — 97110 THERAPEUTIC EXERCISES: CPT

## 2022-01-14 PROCEDURE — 63710000001 INSULIN LISPRO (HUMAN) PER 5 UNITS: Performed by: SURGERY

## 2022-01-14 PROCEDURE — 99231 SBSQ HOSP IP/OBS SF/LOW 25: CPT | Performed by: PHYSICIAN ASSISTANT

## 2022-01-14 PROCEDURE — 80053 COMPREHEN METABOLIC PANEL: CPT | Performed by: HOSPITALIST

## 2022-01-14 PROCEDURE — 85027 COMPLETE CBC AUTOMATED: CPT | Performed by: HOSPITALIST

## 2022-01-14 PROCEDURE — 82962 GLUCOSE BLOOD TEST: CPT

## 2022-01-14 PROCEDURE — 25010000002 EPOETIN ALFA-EPBX 10000 UNIT/ML SOLUTION: Performed by: INTERNAL MEDICINE

## 2022-01-14 RX ORDER — FUROSEMIDE 40 MG/1
40 TABLET ORAL 2 TIMES DAILY
Qty: 60 TABLET | Refills: 0 | Status: SHIPPED | OUTPATIENT
Start: 2022-01-14 | End: 2022-07-22 | Stop reason: HOSPADM

## 2022-01-14 RX ADMIN — INSULIN LISPRO 2 UNITS: 100 INJECTION, SOLUTION INTRAVENOUS; SUBCUTANEOUS at 17:27

## 2022-01-14 RX ADMIN — INSULIN LISPRO 3 UNITS: 100 INJECTION, SOLUTION INTRAVENOUS; SUBCUTANEOUS at 17:27

## 2022-01-14 RX ADMIN — HYDROCODONE BITARTRATE AND ACETAMINOPHEN 1 TABLET: 5; 325 TABLET ORAL at 11:32

## 2022-01-14 RX ADMIN — INSULIN LISPRO 2 UNITS: 100 INJECTION, SOLUTION INTRAVENOUS; SUBCUTANEOUS at 11:32

## 2022-01-14 RX ADMIN — INSULIN LISPRO 3 UNITS: 100 INJECTION, SOLUTION INTRAVENOUS; SUBCUTANEOUS at 11:32

## 2022-01-14 RX ADMIN — TAMSULOSIN HYDROCHLORIDE 0.4 MG: 0.4 CAPSULE ORAL at 09:36

## 2022-01-14 RX ADMIN — FOLIC ACID 1 MG: 1 TABLET ORAL at 09:36

## 2022-01-14 RX ADMIN — HYDROCODONE BITARTRATE AND ACETAMINOPHEN 1 TABLET: 5; 325 TABLET ORAL at 21:25

## 2022-01-14 RX ADMIN — SERTRALINE 100 MG: 100 TABLET, FILM COATED ORAL at 09:36

## 2022-01-14 RX ADMIN — AMLODIPINE BESYLATE 5 MG: 5 TABLET ORAL at 09:36

## 2022-01-14 RX ADMIN — MULTIPLE VITAMINS W/ MINERALS TAB 1 TABLET: TAB at 09:36

## 2022-01-14 RX ADMIN — INSULIN LISPRO 3 UNITS: 100 INJECTION, SOLUTION INTRAVENOUS; SUBCUTANEOUS at 07:42

## 2022-01-14 RX ADMIN — FUROSEMIDE 40 MG: 40 TABLET ORAL at 17:27

## 2022-01-14 RX ADMIN — LEVOTHYROXINE SODIUM 125 MCG: 0.12 TABLET ORAL at 09:36

## 2022-01-14 RX ADMIN — ASPIRIN 81 MG: 81 TABLET, COATED ORAL at 09:36

## 2022-01-14 RX ADMIN — OLANZAPINE 5 MG: 5 TABLET ORAL at 20:16

## 2022-01-14 RX ADMIN — OLANZAPINE 5 MG: 5 TABLET ORAL at 09:36

## 2022-01-14 RX ADMIN — INSULIN LISPRO 2 UNITS: 100 INJECTION, SOLUTION INTRAVENOUS; SUBCUTANEOUS at 07:42

## 2022-01-14 RX ADMIN — ROPINIROLE HYDROCHLORIDE 0.5 MG: 0.5 TABLET, FILM COATED ORAL at 20:16

## 2022-01-14 RX ADMIN — HYDROCODONE BITARTRATE AND ACETAMINOPHEN 1 TABLET: 5; 325 TABLET ORAL at 16:28

## 2022-01-14 RX ADMIN — EPOETIN ALFA-EPBX 10000 UNITS: 10000 INJECTION, SOLUTION INTRAVENOUS; SUBCUTANEOUS at 11:53

## 2022-01-14 RX ADMIN — ROPINIROLE HYDROCHLORIDE 0.5 MG: 0.5 TABLET, FILM COATED ORAL at 09:36

## 2022-01-14 RX ADMIN — PANTOPRAZOLE SODIUM 40 MG: 40 TABLET, DELAYED RELEASE ORAL at 09:36

## 2022-01-14 RX ADMIN — HYDROCODONE BITARTRATE AND ACETAMINOPHEN 1 TABLET: 5; 325 TABLET ORAL at 07:42

## 2022-01-14 RX ADMIN — HYDROCODONE BITARTRATE AND ACETAMINOPHEN 1 TABLET: 5; 325 TABLET ORAL at 02:55

## 2022-01-14 NOTE — PROGRESS NOTES
[facility] Progress Note      Patient Name: Zaina Martinez  : 1965  MRN: [id number]  Primary Care Physician:  Karson Oreilly MD  Date of admission: 2022    Subjective     Interval History:   Seen and examined.  Laying down flat.  Had dialysis yesterday.  Sleepy and not answering questions.    Review of Systems:   As noted above    Objective     Vitals:   Temp:  [97.4 °F (36.3 °C)-98.1 °F (36.7 °C)] 98 °F (36.7 °C)  Heart Rate:  [48-50] 50  Resp:  [16-18] 18  BP: (130-169)/(70-84) 169/81  Flow (L/min):  [2] 2    Intake/Output Summary (Last 24 hours) at 2022 0557  Last data filed at 2022 1739  Gross per 24 hour   Intake 360 ml   Output 3000 ml   Net -2640 ml       Physical Exam:    General Appearance: Sleepy in no acute distress  Skin: warm and dry  HEENT: oral mucosa normal, nonicteric sclera  Neck: supple, no JVD  Lungs: CTA  Heart: RRR, normal S1 and S2  Abdomen: soft, nontender, nondistended  : no palpable bladder  Extremities: no edema, cyanosis or clubbing      Scheduled Meds:     amLODIPine, 5 mg, Oral, Daily  aspirin, 81 mg, Oral, Daily  epoetin brooke/brooke-epbx, 10,000 Units, Subcutaneous, Once per day on   folic acid, 1 mg, Oral, Daily  furosemide, 40 mg, Oral, BID  insulin lispro, 0-9 Units, Subcutaneous, TID With Meals  insulin lispro, 3 Units, Subcutaneous, TID With Meals  levothyroxine, 125 mcg, Oral, Daily  metoprolol tartrate, 25 mg, Oral, BID  multivitamin with minerals, 1 tablet, Oral, Daily  OLANZapine, 5 mg, Oral, BID  pantoprazole, 40 mg, Oral, Daily  rOPINIRole, 0.5 mg, Oral, Q12H  sertraline, 100 mg, Oral, Daily  tamsulosin, 0.4 mg, Oral, Daily      IV Meds:        Results Reviewed:   I have personally reviewed the results from the time of this admission to 2022 05:57 EST     Results from last 7 days   Lab Units 22  0643 22  0808 22  0746   SODIUM mmol/L 133* 133* 134*   POTASSIUM mmol/L 4.5 4.2 4.4   CHLORIDE  mmol/L 98 98 99   CO2 mmol/L 20.0* 20.4* 18.5*   BUN mg/dL 28* 20 28*   CREATININE mg/dL 3.68* 2.53* 3.02*   CALCIUM mg/dL 7.7* 7.3* 7.6*   BILIRUBIN mg/dL 2.4* 1.6* 2.5*   ALK PHOS U/L 692* 525* 608*   ALT (SGPT) U/L 37* 35* 41*   AST (SGOT) U/L 58* 56* 86*   GLUCOSE mg/dL 167* 241* 160*     Estimated Creatinine Clearance: 15.6 mL/min (A) (by C-G formula based on SCr of 3.68 mg/dL (H)).          Results from last 7 days   Lab Units 01/13/22  0643 01/12/22  1728 01/12/22  0808 01/11/22  0746 01/10/22  0626 01/09/22  0833 01/09/22  0833   WBC 10*3/mm3 12.20*  --  13.84* 16.52* 10.91*  --  11.39*   HEMOGLOBIN g/dL 8.0* 7.7* 7.6* 6.1* 8.2*   < > 9.2*   PLATELETS 10*3/mm3 223  --  196 264 233  --  244    < > = values in this interval not displayed.           Assessment / Plan     ASSESSMENT:     ESRD-  Normal dialysis at [facility].  TDC for access.   Discussed with her and  importance of staying on dialysis full treatment.  Volume Overload-  continue to diurese and UF with dialysis Discussed fluid restriction to prevent large intra dialytic weight gain.  Improved  Acute cholecystitis: Had cholecystectomy 01/10/2021  Anemia-  Monitor, not far from goal. Procrit with HD  HTN-blood pressure is reasonably controlled  RLS  DMII-  Poor control previously.     PLAN:     Hemodialysis at AM  Surveillance labs    Thank you for involving us in the care of Zaina RAJ Littley.  Please feel free to call with any questions.    Ruthann Palmer MD  01/14/22  05:57 EST    [facility]  [phone]

## 2022-01-14 NOTE — CASE MANAGEMENT/SOCIAL WORK
Continued Stay Note  [facility]     Patient Name: Zaina Martinez  MRN: [id number]  Today's Date: 1/14/2022    Admit Date: 1/9/2022     Discharge Plan     Row Name 01/14/22 1626       Plan    Plan Plans are to stay and have HD here @  [facility] on Sat.  CCP called [facility] and updated that pt will be starting on Monday.               Discharge Codes    No documentation.               Expected Discharge Date and Time     Expected Discharge Date Expected Discharge Time    Jan 14, 2022             Ruth Huerta RN

## 2022-01-14 NOTE — PLAN OF CARE
Goal Outcome Evaluation:  Plan of Care Reviewed With: patient        Progress: improving  Outcome Summary: Pt tolerated treatment well this date. Increased gait distance to 100ft w/ Rw and SBA-CGA. Encouraged pt to ambulate w/ nsg during the day. Also reviewed over a few LE exercises w/ pt and encouraged her to perform on own.

## 2022-01-14 NOTE — DISCHARGE INSTR - APPOINTMENTS
YOLY on Monday Jan 17    [facility]   [address]    @ 420 pm    Please arrive 1st day @ 345 pm to sign paperwork.

## 2022-01-14 NOTE — PROGRESS NOTES
[facility]  Inpatient Progress Note    Reason for Follow-up: Elevated LFTs, chronic anemia    Subjective     Interval History:   Postop day 4 status post lap fannie with normal IOC.  Patient found resting comfortably.  Continues to pass flatus but still no bowel movement.  She endorses pain associated with laparoscopic incisional sites.  No nausea or vomiting.  Tolerating p.o. intake without incident.  Hemoglobin stable at 8.2.  Chemistries pending at this time.  Inquiring about discharge, deferred to hospitalist.    Current Facility-Administered Medications:   •  acetaminophen (TYLENOL) tablet 650 mg, 650 mg, Oral, Q4H PRN, Ramana Raygoza MD, 650 mg at 01/13/22 2150  •  amLODIPine (NORVASC) tablet 5 mg, 5 mg, Oral, Daily, Ramana Raygoza MD, 5 mg at 01/14/22 0936  •  aspirin EC tablet 81 mg, 81 mg, Oral, Daily, Ramana Raygoza MD, 81 mg at 01/14/22 0936  •  dextrose (D50W) (25 g/50 mL) IV injection 25 g, 25 g, Intravenous, Q15 Min PRN, Ramana Raygoza MD  •  dextrose (GLUTOSE) oral gel 15 g, 15 g, Oral, Q15 Min PRN, Ramana Raygoza MD  •  epoetin brooke-epbx (RETACRIT) injection 10,000 Units, 10,000 Units, Subcutaneous, Once per day on Mon Wed Fri, Ruthann Palmer MD  •  folic acid (FOLVITE) tablet 1 mg, 1 mg, Oral, Daily, Ramana Raygoza MD, 1 mg at 01/14/22 0936  •  furosemide (LASIX) tablet 40 mg, 40 mg, Oral, BID, Ramana Raygoza MD, 40 mg at 01/13/22 1801  •  glucagon (human recombinant) (GLUCAGEN DIAGNOSTIC) injection 1 mg, 1 mg, Subcutaneous, Q15 Min PRN, Ramana Raygoza MD  •  heparin (porcine) injection 4,000 Units, 4,000 Units, Intracatheter, PRN, Ramnaa Raygoza MD, 4,000 Units at 01/11/22 1849  •  HYDROcodone-acetaminophen (NORCO) 5-325 MG per tablet 1 tablet, 1 tablet, Oral, Q4H PRN, Ramana Raygoza MD, 1 tablet at 01/14/22 0742  •  insulin lispro (ADMELOG) injection 0-9 Units, 0-9 Units, Subcutaneous, TID With Meals, Ramana Raygoza MD, 2  Units at 01/14/22 0742  •  insulin lispro (ADMELOG) injection 3 Units, 3 Units, Subcutaneous, TID With Meals, Ramana Raygoza MD, 3 Units at 01/14/22 0742  •  levothyroxine (SYNTHROID, LEVOTHROID) tablet 125 mcg, 125 mcg, Oral, Daily, Ramana Raygoza MD, 125 mcg at 01/14/22 0936  •  metoprolol tartrate (LOPRESSOR) tablet 25 mg, 25 mg, Oral, BID, Ramana Raygoza MD, 25 mg at 01/13/22 2145  •  multivitamin with minerals 1 tablet, 1 tablet, Oral, Daily, Ramana Raygoza MD, 1 tablet at 01/14/22 0936  •  nitroglycerin (NITROSTAT) SL tablet 0.4 mg, 0.4 mg, Sublingual, Q5 Min PRN, Ramana Raygoza MD  •  OLANZapine (zyPREXA) tablet 5 mg, 5 mg, Oral, BID, Ramana Raygoza MD, 5 mg at 01/14/22 0936  •  ondansetron (ZOFRAN) tablet 4 mg, 4 mg, Oral, Q6H PRN, 4 mg at 01/13/22 2144 **OR** ondansetron (ZOFRAN) injection 4 mg, 4 mg, Intravenous, Q6H PRN, Ramana Raygoza MD, 4 mg at 01/11/22 0919  •  pantoprazole (PROTONIX) EC tablet 40 mg, 40 mg, Oral, Daily, Ramana Raygoza MD, 40 mg at 01/14/22 0936  •  rOPINIRole (REQUIP) tablet 0.5 mg, 0.5 mg, Oral, Q12H, Ramana Raygoza MD, 0.5 mg at 01/14/22 0936  •  sertraline (ZOLOFT) tablet 100 mg, 100 mg, Oral, Daily, Ramana Raygoza MD, 100 mg at 01/14/22 0936  •  tamsulosin (FLOMAX) 24 hr capsule 0.4 mg, 0.4 mg, Oral, Daily, Ramana Raygoza MD, 0.4 mg at 01/14/22 0936     Review of Systems:    Constitutional: No fevers, chills, sweats   Respiratory: No shortness of breath, cough   Cardiovascular: No Chest pain, palpitations   Gastrointestinal: No nausea, vomiting, diarrhea   Genitourinary: No hematuria, dysuria    Objective     Vital Signs  Temp:  [97.4 °F (36.3 °C)-98.1 °F (36.7 °C)] 98 °F (36.7 °C)  Heart Rate:  [47-50] 47  Resp:  [16-18] 16  BP: (130-192)/() 139/70  Body mass index is 28.06 kg/m².    Intake/Output Summary (Last 24 hours) at 1/14/2022 1043  Last data filed at 1/14/2022 0742  Gross per 24 hour   Intake 240 ml   Output 3000 ml   Net  -2760 ml     I/O this shift:  In: 120 [P.O.:120]  Out: -      Physical Exam:   General: Awake, alert and conversive. No acute distress.   Eyes: Normal lids and lashes, no scleral icterus.   Neck: Supple and symmetric. Trachea midline.    Skin: Warm and dry, not jaundiced.    Cardiovascular: Regular rate and rhythm. No murmur.  Right anterior chest tunneled catheter in situ   Pulm: Quiet, even, nonlabored breathing. Clear to auscultation bilaterally.    Abdomen: Soft, nondistended, nontender. No rebound or guarding. Bowel sounds present in all 4 quadrants.  Mild appropriate postoperative tenderness.  Multiple areas of resolving ecchymosis associated with incision sites.   Extremities: No rashes or edema.   Psychiatric: Appropriate mood and affect. Cooperative.     Results Review:     I reviewed the patient's new clinical results.    Results from last 7 days   Lab Units 01/14/22  0952 01/13/22  0643 01/12/22  1728 01/12/22  0808 01/12/22  0808   WBC 10*3/mm3 11.52* 12.20*  --   --  13.84*   HEMOGLOBIN g/dL 8.2* 8.0* 7.7*   < > 7.6*   HEMATOCRIT % 25.0* 24.4* 22.8*   < > 24.0*   PLATELETS 10*3/mm3 244 223  --   --  196    < > = values in this interval not displayed.     Results from last 7 days   Lab Units 01/13/22  0643 01/12/22  0808 01/11/22  0746   SODIUM mmol/L 133* 133* 134*   POTASSIUM mmol/L 4.5 4.2 4.4   CHLORIDE mmol/L 98 98 99   CO2 mmol/L 20.0* 20.4* 18.5*   BUN mg/dL 28* 20 28*   CREATININE mg/dL 3.68* 2.53* 3.02*   CALCIUM mg/dL 7.7* 7.3* 7.6*   BILIRUBIN mg/dL 2.4* 1.6* 2.5*   ALK PHOS U/L 692* 525* 608*   ALT (SGPT) U/L 37* 35* 41*   AST (SGOT) U/L 58* 56* 86*   GLUCOSE mg/dL 167* 241* 160*         Lab Results   Lab Value Date/Time    LIPASE 21 12/19/2021 1314       Radiology:  XR Abdomen KUB   Final Result       As described.       This report was finalized on 1/12/2022 3:37 PM by Dr. Jared Kern M.D.          [place] Cholangiogram Operative   Final Result       As described.       This report was  finalized on 1/10/2022 7:44 PM by Dr. Jared Kern M.D.          US Liver   Final Result       1. There is no intra or extrahepatic biliary dilatation.   2. The patient's contracted gallbladder is filled with stones. There is   some gallbladder wall thickening, although this may be related to   incomplete distention. Pericholecystic fluid is also seen, another   nonspecific finding, which can be associated with etiologies other than   acute cholecystitis, including hepatitis and right-sided heart failure.   Correlation with clinical presentation is recommended. HIDA scan could   be considered for further assessment.   3. There is a somewhat echogenic appearance to the portal triads.   Appearance can be associated with hepatitis.   4. Sonographer measures the pancreatic duct and up to 4 mm. No obvious   obstructing lesion is seen. Patient's pancreas appeared atrophic on the   earlier CT. MRCP or ERCP could be considered for further assessment.       This report was finalized on 1/9/2022 10:51 PM by Dr. Cherri Rodriguez M.D.          CT Abdomen Pelvis Without Contrast   Final Result          Assessment/Plan     Patient Active Problem List   Diagnosis   • MAYUR (acute kidney injury) (HCC)   • HTN (hypertension)   • Hypothyroidism   • Type 2 diabetes mellitus with hyperglycemia, with long-term current use of insulin (HCC)   • Rheumatoid arthritis (HCC)   • Angioedema   • GERD (gastroesophageal reflux disease)   • Anemia   • Medically noncompliant   • Myocardial infarction due to demand ischemia (HCC)   • Enteritis   • PRES (posterior reversible encephalopathy syndrome)   • Urine retention   • Klebsiella infection   • Superficial thrombophlebitis   • Generalized weakness   • ESRD (end stage renal disease) (HCC)   • CAD (coronary artery disease)   • Abnormal urinalysis   • Acute on chronic diastolic CHF (congestive heart failure) (HCC)   • Pyelonephritis   • Calculus of gallbladder with acute on chronic  cholecystitis without obstruction   • Pleural effusion on right   • Anemia due to chronic kidney disease, on chronic dialysis (HCC)       Assessment:  1. Mildly elevated LFTs, likely associated with recent surgical intervention  2. Cholelithiasis, postop day 4 status post cholecystectomy with normal intraoperative cholangiogram  3. Chronic anemia; hemoglobin stable at 8.2  4. Right lower quadrant pain; improving  5. ESRD on hemodialysis  6. Constipation      Plan:  · Await pending chemistries.  · Monitor H&H.    I discussed the patient's findings and my recommendations with patient.    Dragon dictation used throughout this note.            JOSE Biggs  [facility]  [address] [place] [address]  Office: [phone]

## 2022-01-14 NOTE — PLAN OF CARE
Goal Outcome Evaluation:  Plan of Care Reviewed With: patient        Progress: improving     Patient continues to be alert and oriented. She is forgetful at times. She did get OOB to chair this shift. Plans for patient to have dialysis tomorrow and then be discharged home. No distress noted. She continues to report 8/10 pain to abdomen lap incision sites. Pain medication administered as per order. Falls precautions maintained. Call light in reach. She is able to make her needs known to staff. Bed alarm on. She continues to wear 2L NC while sleeping.

## 2022-01-14 NOTE — THERAPY TREATMENT NOTE
Patient Name: Zaina Martinez  : 1965    MRN: [id number]                              Today's Date: 2022       Admit Date: 2022    Visit Dx:     ICD-10-CM ICD-9-CM   1. Pyelonephritis  N12 590.80   2. Hyperbilirubinemia  E80.6 782.4   3. ESRD (end stage renal disease) ([facility])  N18.6 585.6   4. Anasarca  R60.1 782.3   5. Other ascites  R18.8 789.59   6. Pleural effusion on right  J90 511.9   7. Calculus of gallbladder with acute on chronic cholecystitis without obstruction  K80.12 574.00     574.10     Patient Active Problem List   Diagnosis   • MAYUR (acute kidney injury) ([facility])   • HTN (hypertension)   • Hypothyroidism   • Type 2 diabetes mellitus with hyperglycemia, with long-term current use of insulin ([facility])   • Rheumatoid arthritis ([facility])   • Angioedema   • GERD (gastroesophageal reflux disease)   • Anemia   • Medically noncompliant   • Myocardial infarction due to demand ischemia ([facility])   • Enteritis   • PRES (posterior reversible encephalopathy syndrome)   • Urine retention   • Klebsiella infection   • Superficial thrombophlebitis   • Generalized weakness   • ESRD (end stage renal disease) ([facility])   • CAD (coronary artery disease)   • Abnormal urinalysis   • Acute on chronic diastolic CHF (congestive heart failure) ([facility])   • Pyelonephritis   • Calculus of gallbladder with acute on chronic cholecystitis without obstruction   • Pleural effusion on right   • Anemia due to chronic kidney disease, on chronic dialysis ([facility])     Past Medical History:   Diagnosis Date   • Acute on chronic diastolic CHF (congestive heart failure) ([facility])    • CAD (coronary artery disease) 2021   • Diabetes ([facility])    • Disease of thyroid gland    • GERD (gastroesophageal reflux disease)    • Hypertension    • Rheumatoid arthritis ([facility])      Past Surgical History:   Procedure Laterality Date   • CHOLECYSTECTOMY WITH INTRAOPERATIVE CHOLANGIOGRAM N/A 1/10/2022    Procedure: Laparoscopic cholecystectomy with intraoperative  cholangiogram;  Surgeon: Ramana Raygoza MD;  [facility] MAIN OR;  Service: General;  Laterality: N/A;   • EYE SURGERY     • HYSTERECTOMY     • INSERTION HEMODIALYSIS CATHETER N/A 12/6/2021    Procedure: HEMODIALYSIS CATHETER INSERTION;  Surgeon: Keli Salazar MD;  Location: [facility] OR 18/19;  Service: Vascular;  Laterality: N/A;      General Information     Row Name 01/14/22 1618          Physical Therapy Time and Intention    Document Type therapy note (daily note)  -     Mode of Treatment physical therapy  -     Row Name 01/14/22 1618          General Information    Existing Precautions/Restrictions fall  -     Row Name 01/14/22 1618          Cognition    Orientation Status (Cognition) oriented x 4  -           User Key  (r) = Recorded By, (t) = Taken By, (c) = Cosigned By    Initials Name Provider Type     Tesha Baires PTA Physical Therapy Assistant               Mobility     Row Name 01/14/22 1624          Bed Mobility    Bed Mobility sit-supine  -     Sit-Supine [place] (Bed Mobility) supervision  -     Row Name 01/14/22 1624          Sit-Stand Transfer    Sit-Stand [place] (Transfers) standby assist  -     Row Name 01/14/22 1624          Gait/Stairs (Locomotion)    [place] Level (Gait) standby assist; contact guard  -     Assistive Device (Gait) walker, front-wheeled  -     Distance in Feet (Gait) 100  -     Deviations/Abnormal Patterns (Gait) kenn decreased; stride length decreased  -     Bilateral Gait Deviations forward flexed posture  -           User Key  (r) = Recorded By, (t) = Taken By, (c) = Cosigned By    Initials Name Provider Type     Tesha Baires PTA Physical Therapy Assistant               Obj/Interventions    No documentation.                Goals/Plan    No documentation.                Clinical Impression     Row Name 01/14/22 1627          Pain    Additional Documentation Pain Scale: Numbers  Pre/Post-Treatment (Group)  -     Row Name 01/14/22 1627          Pain Scale: Numbers Pre/Post-Treatment    Pretreatment Pain Rating 2/10  -     Posttreatment Pain Rating 3/10  -     Pain Location - Side Right  -     Pain Location abdomen  -     Pain Intervention(s) Repositioned; Ambulation/increased activity; Rest  -     Row Name 01/14/22 1627          Positioning and Restraints    Pre-Treatment Position sitting in chair/recliner  -     Post Treatment Position bed  -SM     In Bed supine; call light within reach; encouraged to call for assist; exit alarm on  -           User Key  (r) = Recorded By, (t) = Taken By, (c) = Cosigned By    Initials Name Provider Type    Tesha Zuniga PTA Physical Therapy Assistant               Outcome Measures     Row Name 01/14/22 1628          How much help from another person do you currently need...    Turning from your back to your side while in flat bed without using bedrails? 3  -SM     Moving from lying on back to sitting on the side of a flat bed without bedrails? 3  -SM     Moving to and from a bed to a chair (including a wheelchair)? 3  -SM     Standing up from a chair using your arms (e.g., wheelchair, bedside chair)? 3  -SM     Climbing 3-5 steps with a railing? 3  -SM     To walk in hospital room? 3  -SM     AM-PAC 6 Clicks Score (PT) 18  -     Row Name 01/14/22 1628          Functional Assessment    Outcome Measure Options AM-PAC 6 Clicks Basic Mobility (PT)  -           User Key  (r) = Recorded By, (t) = Taken By, (c) = Cosigned By    Initials Name Provider Type    Tesha Zuniga PTA Physical Therapy Assistant                             Physical Therapy Education                 Title: PT OT SLP Therapies (In Progress)     Topic: Physical Therapy (Done)     Point: Mobility training (Done)     Learning Progress Summary           Patient Acceptance, E,TB,D, VU,NR by MIRZA at 1/14/2022 1629    Acceptance, E, VU by PING at 1/12/2022 1134                    Point: Home exercise program (Done)     Learning Progress Summary           Patient Acceptance, E,TB,D, VU,NR by  at 1/14/2022 1629    Acceptance, E, VU by  at 1/12/2022 1134                   Point: Body mechanics (Done)     Learning Progress Summary           Patient Acceptance, E,TB,D, VU,NR by  at 1/14/2022 1629    Acceptance, E, VU by  at 1/12/2022 1134                   Point: Precautions (Done)     Learning Progress Summary           Patient Acceptance, E,TB,D, VU,NR by  at 1/14/2022 1629    Acceptance, E, VU by  at 1/12/2022 1134                               User Key     Initials Effective Dates Name Provider Type Discipline     03/07/18 -  Tesha Baires PTA Physical Therapy Assistant PT     10/25/19 -  Croinna Barber PT Physical Therapist PT              PT Recommendation and Plan     Plan of Care Reviewed With: patient  Progress: improving  Outcome Summary: Pt tolerated treatment well this date. Increased gait distance to 100ft w/ Rw and SBA-CGA. Encouraged pt to ambulate w/ nsg during the day. Also reviewed over a few LE exercises w/ pt and encouraged her to perform on own.     Time Calculation:    PT Charges     Row Name 01/14/22 1630             Time Calculation    Start Time 1545  -      Stop Time 1608  -      Time Calculation (min) 23 min  -      PT Received On 01/14/22  -      PT - Next Appointment 01/17/22  -            User Key  (r) = Recorded By, (t) = Taken By, (c) = Cosigned By    Initials Name Provider Type     Tesha Baires PTA Physical Therapy Assistant              Therapy Charges for Today     Code Description Service Date Service Provider Modifiers Qty    38652381317 HC PT THER PROC EA 15 MIN 1/14/2022 Tesha Baires PTA GP 2          PT G-Codes  Outcome Measure Options: AM-PAC 6 Clicks Basic Mobility (PT)  AM-PAC 6 Clicks Score (PT): 18    Tesha Baires PTA  1/14/2022

## 2022-01-14 NOTE — PROGRESS NOTES
[facility] Progress Note      Patient Name: Zaina Martinez  : 1965  MRN: [id number]  Primary Care Physician:  Karson Oreilly MD  Date of admission: 2022    Subjective     Interval History:   Seen and examined.  Laying down flat.  Quite sedated.  Received Norco and Librax this morning.      Review of Systems:   As noted above    Objective     Vitals:   Temp:  [97.4 °F (36.3 °C)-98.6 °F (37 °C)] 98 °F (36.7 °C)  Heart Rate:  [48-50] 50  Resp:  [16-18] 18  BP: (106-161)/(53-84) 161/71  Flow (L/min):  [2] 2    Intake/Output Summary (Last 24 hours) at 2022 2231  Last data filed at 2022 1739  Gross per 24 hour   Intake 360 ml   Output 3000 ml   Net -2640 ml       Physical Exam:    General Appearance: Sleepy in no acute distress  Skin: warm and dry  HEENT: oral mucosa normal, nonicteric sclera  Neck: supple, no JVD  Lungs: CTA  Heart: RRR, normal S1 and S2  Abdomen: soft, nontender, nondistended  : no palpable bladder  Extremities: no edema, cyanosis or clubbing  Neuro: normal speech and mental status     Scheduled Meds:     amLODIPine, 5 mg, Oral, Daily  aspirin, 81 mg, Oral, Daily  [START ON 2022] epoetin brooke/brooke-epbx, 10,000 Units, Subcutaneous, Once per day on   folic acid, 1 mg, Oral, Daily  furosemide, 40 mg, Oral, BID  insulin lispro, 0-9 Units, Subcutaneous, TID With Meals  insulin lispro, 3 Units, Subcutaneous, TID With Meals  levothyroxine, 125 mcg, Oral, Daily  metoprolol tartrate, 25 mg, Oral, BID  multivitamin with minerals, 1 tablet, Oral, Daily  OLANZapine, 5 mg, Oral, BID  pantoprazole, 40 mg, Oral, Daily  rOPINIRole, 0.5 mg, Oral, Q12H  sertraline, 100 mg, Oral, Daily  tamsulosin, 0.4 mg, Oral, Daily      IV Meds:        Results Reviewed:   I have personally reviewed the results from the time of this admission to 2022 22:31 EST     Results from last 7 days   Lab Units 22  0643 22  0808 22  0746   SODIUM mmol/L  133* 133* 134*   POTASSIUM mmol/L 4.5 4.2 4.4   CHLORIDE mmol/L 98 98 99   CO2 mmol/L 20.0* 20.4* 18.5*   BUN mg/dL 28* 20 28*   CREATININE mg/dL 3.68* 2.53* 3.02*   CALCIUM mg/dL 7.7* 7.3* 7.6*   BILIRUBIN mg/dL 2.4* 1.6* 2.5*   ALK PHOS U/L 692* 525* 608*   ALT (SGPT) U/L 37* 35* 41*   AST (SGOT) U/L 58* 56* 86*   GLUCOSE mg/dL 167* 241* 160*     Estimated Creatinine Clearance: 15.6 mL/min (A) (by C-G formula based on SCr of 3.68 mg/dL (H)).          Results from last 7 days   Lab Units 01/13/22  0643 01/12/22  1728 01/12/22  0808 01/11/22  0746 01/10/22  0626 01/09/22  0833 01/09/22  0833   WBC 10*3/mm3 12.20*  --  13.84* 16.52* 10.91*  --  11.39*   HEMOGLOBIN g/dL 8.0* 7.7* 7.6* 6.1* 8.2*   < > 9.2*   PLATELETS 10*3/mm3 223  --  196 264 233  --  244    < > = values in this interval not displayed.           Assessment / Plan     ASSESSMENT:     ESRD-  Normal dialysis at [facility].  [facility] for access.   Discussed with her and  importance of staying on dialysis full treatment.  Volume Overload-  continue to diurese and UF with dialysis Discussed fluid restriction to prevent large intra dialytic weight gain.  Acute cholecystitis:Postop day 2 after cholecystectomy   Anemia-  Monitor, not far from goal. Procrit with HD  HTN-  UF as tolarted.  RLS  DMII-  Poor control previously.     PLAN:     Hemodialysis today  Surveillance labs    Thank you for involving us in the care of Zaina Martinez.  Please feel free to call with any questions.    Ruthann Palmer MD  01/13/22  22:31 EST    [facility]  [phone]

## 2022-01-14 NOTE — DISCHARGE SUMMARY
Patient Name: Zaina Martinez  : 1965  MRN: [id number]    Date of Admission: 2022  Date of Discharge:  2022  Primary Care Physician: Karson Oreilly MD      Chief Complaint:   Flank Pain      Discharge Diagnoses     Active Hospital Problems    Diagnosis  POA   • **Calculus of gallbladder with acute on chronic cholecystitis without obstruction [K80.12]  Yes   • Anemia due to chronic kidney disease, on chronic dialysis ([facility]) [N18.6, D63.1, Z99.2]  Not Applicable   • Pyelonephritis [N12]  Yes   • Pleural effusion on right [J90]  Yes   • Acute on chronic diastolic CHF (congestive heart failure) ([facility]) [I50.33]  Yes   • ESRD (end stage renal disease) ([facility]) [N18.6]  Yes   • CAD (coronary artery disease) [I25.10]  Yes   • HTN (hypertension) [I10]  Yes   • Type 2 diabetes mellitus with hyperglycemia, with long-term current use of insulin ([facility]) [E11.65, Z79.4]  Not Applicable   • Rheumatoid arthritis ([facility]) [M06.9]  Yes      Resolved Hospital Problems   No resolved problems to display.        Hospital Course     Ms. Martinez is a 56 y.o. female with a history of ESRD who presented to [facility] initially complaining of flank pain.  Please see the admitting history and physical for further details.  She was found to have possible UTI and abnormal LFTs and was admitted to the hospital for further evaluation and treatment.  She was started on empiric work-up.  Urine culture has grown Enterococcus and source of elevated LFTs felt to be her gallbladder.  She was seen by general surgery who recommended and performed laparoscopic cholecystectomy.  She has had a relatively stable postoperative course.  She had to have multiple sessions of dialysis as she came in volume overloaded.  She has a history of poor medical compliance in general including dialysis sessions.  It seems less likely she has enterococcal UTI.  She did receive 3 days of IV Zosyn.  As stated her presenting issues are dramatically  improved and she appears stable for discharge.  She came to us from a subacute nursing facility but does not wish to return there.  [facility] has set up alternative dialysis arrangements.      Day of Discharge     Subjective:  Feels better today.  Not requesting pain medication.  Wants to go home.    Physical Exam:  Temp:  [97.4 °F (36.3 °C)-98.1 °F (36.7 °C)] 98 °F (36.7 °C)  Heart Rate:  [47-50] 47  Resp:  [16-18] 16  BP: (130-192)/() 139/70  Body mass index is 28.06 kg/m².  Physical Exam  Vitals and nursing note reviewed.   Constitutional:       General: She is not in acute distress.  Neck:      Vascular: No JVD.      Trachea: No tracheal deviation.   Cardiovascular:      Rate and Rhythm: Normal rate and regular rhythm.      Heart sounds: Normal heart sounds.   Pulmonary:      Effort: Pulmonary effort is normal. No respiratory distress.      Breath sounds: Normal breath sounds.   Abdominal:      General: Bowel sounds are normal.      Palpations: Abdomen is soft.      Tenderness: There is no abdominal tenderness.   Musculoskeletal:      Right lower leg: Edema present.      Left lower leg: Edema present.   Skin:     General: Skin is warm and dry.      Coloration: Skin is pale.   Neurological:      General: No focal deficit present.      Mental Status: She is alert and oriented to person, place, and time. Mental status is at baseline.   Psychiatric:         Attention and Perception: She is inattentive.         Cognition and Memory: Cognition is impaired.         Consultants     Consult Orders (all) (From admission, onward)     Start     Ordered    01/10/22 0648  Inpatient General Surgery Consult  Once        Specialty:  General Surgery  Provider:  Ramana Raygoza MD    01/10/22 0647    01/09/22 1607  Inpatient Diabetes Educator Consult  Once,   Status:  Canceled        Provider:  (Not yet assigned)    01/09/22 1607    01/09/22 1548  Inpatient Nephrology Consult  Once        Specialty:  Nephrology  Provider:   Lei Covington MD    01/09/22 1549    01/09/22 1548  Inpatient Gastroenterology Consult  Once        Specialty:  Gastroenterology  Provider:  Frederic Romero MD    01/09/22 1549              Procedures     Laparoscopic cholecystectomy with intraoperative cholangiogram      Imaging Results (All)     Procedure Component Value Units Date/Time    XR Abdomen KUB [[id number]] Collected: 01/12/22 1536     Updated: 01/12/22 1540    Narrative:      XR ABDOMEN KUB-     INDICATIONS: Abdominal pain     TECHNIQUE: Supine views of the abdomen     COMPARISON:  image from CT from 01/09/2022     FINDINGS:      The bowel gas pattern is nonobstructive. No supine evidence for free  intraperitoneal gas. Surgical clips are seen at the right upper  quadrant. No definite nephrolithiasis. Follow-up/further evaluation can  be obtained as indications persist.             Impression:         As described.     This report was finalized on 1/12/2022 3:37 PM by Dr. Jared Kern M.D.       FL Cholangiogram Operative [[id number]] Collected: 01/10/22 1944     Updated: 01/10/22 1948    Narrative:      FL CHOLANGIOGRAM OPERATIVE-     INDICATIONS: Operative cholangiography     TECHNIQUE: FLUOROSCOPIC ASSISTANCE IN THE OPERATING ROOM.     FINDINGS:     154 intraoperative fluoroscopic spot views were obtained and recorded  the PACS for review, revealing opacification of cystic duct remnant,  extrahepatic and central intrahepatic biliary ducts, and a portion of  the duodenum. No biliary obstruction or definite persistent filling  defect. Please see operative report for full details.     Fluoroscopy time: 24.5 seconds       Impression:         As described.     This report was finalized on 1/10/2022 7:44 PM by Dr. Jared Kern M.D.        Liver [[id number]] Collected: 01/09/22 2244     Updated: 01/09/22 2254    Narrative:      LIVER ULTRASOUND     HISTORY: Abnormal elevated liver function tests     COMPARISON: 01/09/2022      TECHNIQUE: Grayscale and color Doppler sonographic images were obtained  through the right upper quadrant.     FINDINGS:  There is limited visualization of the pancreas. It appeared atrophic on  the earlier CT. Sonographer measures the pancreatic duct up to 4 mm.  Multiple shadowing stones are identified within the gallbladder.  Gallbladder wall is thickened, measuring about 5 mm, although this may  be related to incomplete distention. There is some pericholecystic fluid  noted as well. These findings were also noted on the earlier CT. There  is no intra or extrahepatic biliary dilatation. Common bile duct  measures up to 5 mm. Patient does have a right pleural effusion. Liver  is enlarged, measuring up to 18.4 cm in craniocaudal dimensions. There  is some increased echogenicity of the portal triads. Appearance can be  associated with hepatitis. Right kidney measures within normal size  limits, at 11.7 x 4.5 x 6.1 cm. However, it is diffusely echogenic,  which may reflect chronic underlying medical renal disease. There is no  hydronephrosis.       Impression:         1. There is no intra or extrahepatic biliary dilatation.  2. The patient's contracted gallbladder is filled with stones. There is  some gallbladder wall thickening, although this may be related to  incomplete distention. Pericholecystic fluid is also seen, another  nonspecific finding, which can be associated with etiologies other than  acute cholecystitis, including hepatitis and right-sided heart failure.  Correlation with clinical presentation is recommended. HIDA scan could  be considered for further assessment.  3. There is a somewhat echogenic appearance to the portal triads.  Appearance can be associated with hepatitis.  4. Sonographer measures the pancreatic duct and up to 4 mm. No obvious  obstructing lesion is seen. Patient's pancreas appeared atrophic on the  earlier CT. MRCP or ERCP could be considered for further assessment.     This  report was finalized on 1/9/2022 10:51 PM by Dr. Cherri Rodriguez M.D.       CT Abdomen Pelvis Without Contrast [[id number]] Collected: 01/09/22 0841     Updated: 01/09/22 1030    Narrative:      CT SCAN OF THE ABDOMEN AND PELVIS WITHOUT CONTRAST     HISTORY: Right flank pain.     The CT scan was performed as an emergency procedure through the abdomen  and pelvis without contrast and compared to previous CT scans of the  abdomen and pelvis dated 12/02/2021. The following findings are present:  1. The kidneys are normal in size and there is dilatation of the left  renal collecting system and left ureter extending to the ureterovesical  junction that is similar to the previous exam. No obstructing stone is  identified. No right renal obstruction is seen. The bladder is somewhat  decompressed with an appearance of mild generalized wall thickening of  the bladder that is similar to the previous exam.  2. There is generalized anasarca with third spacing of fluid in the soft  tissues that is more prominent on today's exam. There is also slightly  more ascites present throughout the abdomen that extends into the pelvis  with haziness of the mesentery and clinical correlation is required.  3. There is a new small to moderate right pleural effusion and tiny left  pleural effusion and there is some adjacent dense atelectasis and  possible developing pneumonia at the right lung base.  4. The liver, spleen, pancreas, and both adrenal glands are  unremarkable. There are several gallstones within the gallbladder,  unchanged.  5. There is no aortic aneurysm. However, there are several enlarged  retroperitoneal lymph nodes measuring up to 2.5 cm which are similar to  the previous study. These are nonspecific but could relate to a  lymphoproliferative disorder or neoplasm and further clinical  correlation is required.  6. The large and small bowel loops are normal in caliber and show no  inflammatory change. The remainder of the  pelvis is unremarkable.  7. At bone windows, no suspicious bone lesions are seen.              Pertinent Labs     Results from last 7 days   Lab Units 01/14/22  0952 01/13/22  0643 01/12/22  1728 01/12/22  0808 01/11/22  0746 01/11/22  0746   WBC 10*3/mm3 11.52* 12.20*  --  13.84*  --  16.52*   HEMOGLOBIN g/dL 8.2* 8.0* 7.7* 7.6*   < > 6.1*   PLATELETS 10*3/mm3 244 223  --  196  --  264    < > = values in this interval not displayed.     Results from last 7 days   Lab Units 01/13/22  0643 01/12/22  0808 01/11/22  0746 01/10/22  0626   SODIUM mmol/L 133* 133* 134* 132*   POTASSIUM mmol/L 4.5 4.2 4.4 4.7   CHLORIDE mmol/L 98 98 99 96*   CO2 mmol/L 20.0* 20.4* 18.5* 21.3*   BUN mg/dL 28* 20 28* 51*   CREATININE mg/dL 3.68* 2.53* 3.02* 4.03*   GLUCOSE mg/dL 167* 241* 160* 196*   EGFR IF NONAFRICN AM mL/min/1.73 13* 20* 16* 11*     Results from last 7 days   Lab Units 01/13/22  0643 01/12/22  0808 01/11/22  0746 01/10/22  0626   ALBUMIN g/dL 2.50* 2.20* 2.30* 2.60*   BILIRUBIN mg/dL 2.4* 1.6* 2.5* 3.8*   ALK PHOS U/L 692* 525* 608* 811*   AST (SGOT) U/L 58* 56* 86* 47*   ALT (SGPT) U/L 37* 35* 41* 35*     Results from last 7 days   Lab Units 01/13/22  0643 01/12/22  0808 01/11/22  0746 01/10/22  0626   CALCIUM mg/dL 7.7* 7.3* 7.6* 8.1*   ALBUMIN g/dL 2.50* 2.20* 2.30* 2.60*     Results from last 7 days   Lab Units 01/14/22  0952   AMMONIA umol/L 26             Invalid input(s): LDLCALC  Results from last 7 days   Lab Units 01/09/22  0714   URINECX  >100,000 CFU/mL Enterococcus faecalis*     Results from last 7 days   Lab Units 01/09/22  1426   COVID19  Not Detected       Test Results Pending at Discharge     Pending Labs     Order Current Status    Comprehensive Metabolic Panel In process          Discharge Details        Discharge Medications      New Medications      Instructions Start Date   furosemide 40 MG tablet  Commonly known as: LASIX   40 mg, Oral, 2 Times Daily      HYDROcodone-acetaminophen 5-325 MG per  tablet  Commonly known as: NORCO   1 tablet, Oral, Every 6 Hours PRN         Continue These Medications      Instructions Start Date   acetaminophen 325 MG tablet  Commonly known as: TYLENOL   650 mg, Oral, Every 4 Hours PRN      amLODIPine 5 MG tablet  Commonly known as: NORVASC   5 mg, Oral, Daily      aspirin 81 MG EC tablet   81 mg, Oral, Daily      folic acid 1 MG tablet  Commonly known as: FOLVITE   1 mg, Oral, Daily      insulin glargine 100 UNIT/ML injection  Commonly known as: LANTUS, SEMGLEE   10 Units, Subcutaneous, Daily      insulin lispro 100 UNIT/ML injection  Commonly known as: humaLOG   3 Units, Subcutaneous, 3 Times Daily Before Meals      levothyroxine 125 MCG tablet  Commonly known as: SYNTHROID, LEVOTHROID   125 mcg, Oral, Daily      metoprolol tartrate 25 MG tablet  Commonly known as: LOPRESSOR   25 mg, Oral, 2 Times Daily      multivitamin with minerals tablet tablet   1 tablet, Oral, Daily      OLANZapine 5 MG tablet  Commonly known as: zyPREXA   5 mg, Oral, 2 Times Daily      pantoprazole 40 MG EC tablet  Commonly known as: PROTONIX   40 mg, Oral, Daily      rOPINIRole 0.25 MG tablet  Commonly known as: REQUIP   0.75 mg, Oral, Every Night at Bedtime      sertraline 100 MG tablet  Commonly known as: ZOLOFT   100 mg, Oral, Daily      tamsulosin 0.4 MG capsule 24 hr capsule  Commonly known as: FLOMAX   1 capsule, Oral, Daily         Stop These Medications    Accu-Chek Liz Plus test strip  Generic drug: glucose blood     Accu-Chek Guide test strip  Generic drug: glucose blood            No Known Allergies    Discharge Disposition:  Home or Self Care      Discharge Diet:  Diet Order   Procedures   • Diet Regular; GI Soft, Consistent Carbohydrate       Discharge Activity: Per surgery instructions      CODE STATUS:    Code Status and Medical Interventions:   Ordered at: 01/09/22 2730     Level Of Support Discussed With:    Patient     Code Status (Patient has no pulse and is not breathing):     CPR (Attempt to Resuscitate)     Medical Interventions (Patient has pulse or is breathing):    Full       Future Appointments   Date Time Provider Department Center   3/16/2022  9:30 AM John Tovar MD [facility]      Contact information for follow-up providers     Ramana Raygoza MD. Schedule an appointment as soon as possible for a visit in 2 week(s).    Specialty: General Surgery  Contact information:  [address]  [facility] [address]  [facility] [address]  [phone]             Karson Oreilly MD Follow up in 2 week(s).    Specialty: Family Medicine  Contact information:  [address]  [facility] [address]  [facility] [address]  [phone]                   Contact information for after-discharge care     Dialysis/Infusion     [facility] .    Service: Dialysis  Contact information:  [address] [facility] [address] [phone]                 Home Medical Care     [facility] .    Service: Home Health Services  Contact information:  [address]  [facility] [address] [phone]                             Time Spent on Discharge:  Greater than 30 minutes      Fran Yo MD  [facility]  01/14/22  11:01 EST

## 2022-01-14 NOTE — PLAN OF CARE
Problem: Fall Injury Risk  Goal: Absence of Fall and Fall-Related Injury  Outcome: Ongoing, Progressing  Intervention: Promote Injury-Free Environment  Recent Flowsheet Documentation  Taken 1/14/2022 0210 by Tesha Duque RN  Safety Promotion/Fall Prevention: safety round/check completed  Taken 1/14/2022 0005 by Tesha Duque RN  Safety Promotion/Fall Prevention: safety round/check completed  Taken 1/13/2022 2210 by Tesha Duque, RN  Safety Promotion/Fall Prevention: safety round/check completed  Taken 1/13/2022 2010 by Tesha Duque, RN  Safety Promotion/Fall Prevention: safety round/check completed   Goal Outcome Evaluation:

## 2022-01-14 NOTE — NURSING NOTE
Dr. Romero with GI states it is ok for patient to be discharged home from his standpoint. The Surgeon Dr. Raygoza is ok with patient being discharged from his standpoint. Nephrologist, Dr. Dos Santos called to see if it would be ok for patient to resume dialysis on Monday as per her new chair and schedule M-W-F. He states that he wants her to receive dialysis tomorrow. Care manager notified and stated that she would notify Dr. Yo.

## 2022-01-14 NOTE — PROGRESS NOTES
Name: Zaina Martinez ADMIT: 2022   : 1965  PCP: Karson Oreilly MD    MRN: [id number] LOS: 5 days   AGE/SEX: 56 y.o. female  ROOM: [facility]     Subjective   Subjective    S/p fannie  Quite sedated.  Received Norco and her Zyprexa this morning.  Cannot stay awake for interview.    Review of Systems     Objective   Objective   Vital Signs  Temp:  [97.4 °F (36.3 °C)-98 °F (36.7 °C)] 98 °F (36.7 °C)  Heart Rate:  [47-50] 47  Resp:  [16-18] 16  BP: (130-192)/() 161/81  SpO2:  [97 %-99 %] 99 %  on  Flow (L/min):  [2] 2;   Device (Oxygen Therapy): room air  Body mass index is 28.06 kg/m².    Intake/Output Summary (Last 24 hours) at 2022 142  Last data filed at 2022 0742  Gross per 24 hour   Intake 120 ml   Output 3000 ml   Net -2880 ml   Net IO Since Admission: -7,805 mL [22 142]   Wt Readings from Last 1 Encounters:   22 0500 69.6 kg (153 lb 7 oz)   22 0500 69.7 kg (153 lb 10.6 oz)   22 0500 69.9 kg (154 lb 1.6 oz)   22 0500 86.3 kg (190 lb 4.1 oz)   01/10/22 0700 82.3 kg (181 lb 8 oz)   22 1808 77.6 kg (171 lb)   22 1006 79.4 kg (175 lb)     Wt Readings from Last 3 Encounters:   22 69.6 kg (153 lb 7 oz)   21 71.8 kg (158 lb 3.2 oz)   21 69.9 kg (154 lb)       Physical Exam  Vitals and nursing note reviewed.   Constitutional:       General: She is not in acute distress.     Appearance: She is ill-appearing.   Neck:      Vascular: No JVD.      Trachea: No tracheal deviation.   Cardiovascular:      Rate and Rhythm: Normal rate and regular rhythm.      Heart sounds: Normal heart sounds.   Pulmonary:      Effort: Pulmonary effort is normal. No respiratory distress.      Breath sounds: Normal breath sounds.   Abdominal:      General: Bowel sounds are normal.      Palpations: Abdomen is soft.      Tenderness: There is no abdominal tenderness.   Musculoskeletal:      Right lower leg: Edema present.      Left lower leg: Edema present.    Skin:     General: Skin is warm and dry.      Coloration: Skin is pale.   Neurological:      General: No focal deficit present.      Mental Status: She is alert and oriented to person, place, and time.   Psychiatric:         Attention and Perception: She is inattentive.         Cognition and Memory: Cognition is impaired.         Results Review     I reviewed the patient's new clinical results.  Results from last 7 days   Lab Units 01/14/22  0952 01/13/22  0643 01/12/22  1728 01/12/22  0808 01/11/22  0746 01/11/22  0746   WBC 10*3/mm3 11.52* 12.20*  --  13.84*  --  16.52*   HEMOGLOBIN g/dL 8.2* 8.0* 7.7* 7.6*   < > 6.1*   PLATELETS 10*3/mm3 244 223  --  196  --  264    < > = values in this interval not displayed.     Results from last 7 days   Lab Units 01/14/22  0952 01/13/22  0643 01/12/22  0808 01/11/22  0746 01/10/22  0626 01/09/22  0946   SODIUM mmol/L 131* 133* 133* 134* 132* 135*   POTASSIUM mmol/L 4.6 4.5 4.2 4.4 4.7 4.6   CHLORIDE mmol/L 99 98 98 99 96* 96*   CO2 mmol/L 21.4* 20.0* 20.4* 18.5* 21.3* 22.7   BUN mg/dL 23* 28* 20 28* 51* 45*   CREATININE mg/dL 3.20* 3.68* 2.53* 3.02* 4.03* 3.57*   GLUCOSE mg/dL 175* 167* 241* 160* 196* 217*   EGFR IF NONAFRICN AM mL/min/1.73 15* 13* 20* 16* 11* 13*   ALBUMIN g/dL 2.30* 2.50* 2.20* 2.30* 2.60* 2.90*   BILIRUBIN mg/dL 2.8* 2.4* 1.6* 2.5* 3.8* 5.6*   ALK PHOS U/L 752* 692* 525* 608* 811* 1,021*   AST (SGOT) U/L 61* 58* 56* 86* 47* 77*   ALT (SGPT) U/L 35* 37* 35* 41* 35* 46*     Results from last 7 days   Lab Units 01/14/22  0952 01/13/22  0643 01/12/22  0808 01/11/22  0746   CALCIUM mg/dL 8.0* 7.7* 7.3* 7.6*   ALBUMIN g/dL 2.30* 2.50* 2.20* 2.30*     Glucose   Date/Time Value Ref Range Status   01/14/2022 1100 177 (H) 70 - 130 mg/dL Final     Comment:     Meter: [id number] : [id number] Mehul Silvestre    01/14/2022 0644 158 (H) 70 - 130 mg/dL Final     Comment:     Meter: [id number] : [id number] Jay Turk    01/13/2022 1120 172 (H) 70 - 130 mg/dL  Final     Comment:     Meter: [id number] : [id number] Mehul Silvestre NA   01/13/2022 0607 167 (H) 70 - 130 mg/dL Final     Comment:     Meter: [id number] : [id number] Jay Turk NA   01/12/2022 2102 180 (H) 70 - 130 mg/dL Final     Comment:     Meter: [id number] : [id number] Jay Turk NA   01/12/2022 1631 216 (H) 70 - 130 mg/dL Final     Comment:     Meter: [id number] : [id number] Ba Priti NA   01/12/2022 1155 206 (H) 70 - 130 mg/dL Final     Comment:     Meter: [id number] : [id number] Nora Landaverde RN     EEG  Addendum: Correction to Hyperventilation and photic stimulation.  Hyperventilation  was not performed.  Photic stimulation was performed but did not elicit a  robust driving response or an abnormal driving response.  Narrative: Date of onset: 12/3/21 0855  Date of offset: 12/3/21 0922    Indication: altered mental status    Technical description:  This is a 21 channel digital EEG recording that   began on 0855 and ended on 0922.  Jesus and seizure detection software   programs were used.  There is significant artifact at P4 limiting interpretation of that area   throughout the study.  There are also occasional periods of generalized   muscle artifact limit interpretation during those periods.  Background:  A posterior dominant alpha rhythm is not present.  The   patient enters the drowsy state during the record.  The anterior   background is predominantly theta with occasional underlying delta.   hyperventilation and photic stimulation were not performed.  There are no   asymmetries between the two hemispheres.  No interictal activity is   present.    The EKG monitor shows a heart rate that varies between 50 and 70 beats per   minute.    Clinical Interpretation: Somewhat technically limited study due to muscle   artifact and P4 artifact.  With this in mind, this routine EEG recording   is abnormal with diffuse generalized slowing consistent with a mild to   moderate encephalopathy.   No potentially epileptogenic activity, seizure   activity, or focal slowing is present given technical limtations. Clinical   correlation recommended.  XR Abdomen KUB  Narrative: XR ABDOMEN KUB-     INDICATIONS: Abdominal pain     TECHNIQUE: Supine views of the abdomen     COMPARISON:  image from CT from 01/09/2022     FINDINGS:      The bowel gas pattern is nonobstructive. No supine evidence for free  intraperitoneal gas. Surgical clips are seen at the right upper  quadrant. No definite nephrolithiasis. Follow-up/further evaluation can  be obtained as indications persist.           Impression:    As described.     This report was finalized on 1/12/2022 3:37 PM by Dr. Jared Kern M.D.         Scheduled Medications  amLODIPine, 5 mg, Oral, Daily  aspirin, 81 mg, Oral, Daily  epoetin brooke/brooke-epbx, 10,000 Units, Subcutaneous, Once per day on Mon Wed Fri  folic acid, 1 mg, Oral, Daily  furosemide, 40 mg, Oral, BID  insulin lispro, 0-9 Units, Subcutaneous, TID With Meals  insulin lispro, 3 Units, Subcutaneous, TID With Meals  levothyroxine, 125 mcg, Oral, Daily  metoprolol tartrate, 25 mg, Oral, BID  multivitamin with minerals, 1 tablet, Oral, Daily  OLANZapine, 5 mg, Oral, BID  pantoprazole, 40 mg, Oral, Daily  rOPINIRole, 0.5 mg, Oral, Q12H  sertraline, 100 mg, Oral, Daily  tamsulosin, 0.4 mg, Oral, Daily    Infusions   Diet  Diet Regular; GI Soft, Consistent Carbohydrate       Assessment/Plan     Active Hospital Problems    Diagnosis  POA   • **Calculus of gallbladder with acute on chronic cholecystitis without obstruction [K80.12]  Yes   • Anemia due to chronic kidney disease, on chronic dialysis ([facility]) [N18.6, D63.1, Z99.2]  Not Applicable   • Pyelonephritis [N12]  Yes   • Pleural effusion on right [J90]  Yes   • Acute on chronic diastolic CHF (congestive heart failure) ([facility]) [I50.33]  Yes   • ESRD (end stage renal disease) ([facility]) [N18.6]  Yes   • CAD (coronary artery disease) [I25.10]  Yes   • HTN  (hypertension) [I10]  Yes   • Type 2 diabetes mellitus with hyperglycemia, with long-term current use of insulin ([facility]) [E11.65, Z79.4]  Not Applicable   • Rheumatoid arthritis ([facility]) [M06.9]  Yes      Resolved Hospital Problems   No resolved problems to display.       56 y.o. female admitted with Calculus of gallbladder with acute on chronic cholecystitis without obstruction.    She is s/p Laparoscopic cholecystectomy with intraoperative cholangiogram    Patient discharged but then renal says they want to keep another day to due hemodialysis tomorrow.  See summary for details.        · SCDs for DVT prophylaxis.  · Full code.  · Discussed with patient and CCP.  · Anticipate discharge to SNU facility tomorrow after HD if okay with renal.      Fran Yo MD  [facility]  01/14/22  14:22 EST

## 2022-01-15 VITALS
RESPIRATION RATE: 16 BRPM | OXYGEN SATURATION: 99 % | BODY MASS INDEX: 28.89 KG/M2 | WEIGHT: 156.97 LBS | HEART RATE: 52 BPM | DIASTOLIC BLOOD PRESSURE: 86 MMHG | SYSTOLIC BLOOD PRESSURE: 183 MMHG | TEMPERATURE: 98 F | HEIGHT: 62 IN

## 2022-01-15 LAB
GLUCOSE BLDC GLUCOMTR-MCNC: 241 MG/DL (ref 70–130)
GLUCOSE BLDC GLUCOMTR-MCNC: 263 MG/DL (ref 70–130)

## 2022-01-15 PROCEDURE — 63710000001 INSULIN LISPRO (HUMAN) PER 5 UNITS: Performed by: SURGERY

## 2022-01-15 PROCEDURE — 82962 GLUCOSE BLOOD TEST: CPT

## 2022-01-15 PROCEDURE — 25010000002 HEPARIN (PORCINE) PER 1000 UNITS: Performed by: SURGERY

## 2022-01-15 RX ADMIN — HYDROCODONE BITARTRATE AND ACETAMINOPHEN 1 TABLET: 5; 325 TABLET ORAL at 01:19

## 2022-01-15 RX ADMIN — OLANZAPINE 5 MG: 5 TABLET ORAL at 08:13

## 2022-01-15 RX ADMIN — FOLIC ACID 1 MG: 1 TABLET ORAL at 08:13

## 2022-01-15 RX ADMIN — TAMSULOSIN HYDROCHLORIDE 0.4 MG: 0.4 CAPSULE ORAL at 08:13

## 2022-01-15 RX ADMIN — INSULIN LISPRO 3 UNITS: 100 INJECTION, SOLUTION INTRAVENOUS; SUBCUTANEOUS at 08:14

## 2022-01-15 RX ADMIN — LEVOTHYROXINE SODIUM 125 MCG: 0.12 TABLET ORAL at 08:18

## 2022-01-15 RX ADMIN — INSULIN LISPRO 6 UNITS: 100 INJECTION, SOLUTION INTRAVENOUS; SUBCUTANEOUS at 11:50

## 2022-01-15 RX ADMIN — METOPROLOL TARTRATE 25 MG: 25 TABLET, FILM COATED ORAL at 08:18

## 2022-01-15 RX ADMIN — FUROSEMIDE 40 MG: 40 TABLET ORAL at 08:13

## 2022-01-15 RX ADMIN — SERTRALINE 100 MG: 100 TABLET, FILM COATED ORAL at 08:13

## 2022-01-15 RX ADMIN — INSULIN LISPRO 3 UNITS: 100 INJECTION, SOLUTION INTRAVENOUS; SUBCUTANEOUS at 11:50

## 2022-01-15 RX ADMIN — HEPARIN SODIUM 4000 UNITS: 1000 INJECTION INTRAVENOUS; SUBCUTANEOUS at 16:15

## 2022-01-15 RX ADMIN — HYDROCODONE BITARTRATE AND ACETAMINOPHEN 1 TABLET: 5; 325 TABLET ORAL at 08:13

## 2022-01-15 RX ADMIN — MULTIPLE VITAMINS W/ MINERALS TAB 1 TABLET: TAB at 08:13

## 2022-01-15 RX ADMIN — ACETAMINOPHEN 650 MG: 325 TABLET, FILM COATED ORAL at 16:15

## 2022-01-15 RX ADMIN — AMLODIPINE BESYLATE 5 MG: 5 TABLET ORAL at 08:13

## 2022-01-15 RX ADMIN — ASPIRIN 81 MG: 81 TABLET, COATED ORAL at 08:13

## 2022-01-15 RX ADMIN — PANTOPRAZOLE SODIUM 40 MG: 40 TABLET, DELAYED RELEASE ORAL at 08:14

## 2022-01-15 RX ADMIN — HYDROCODONE BITARTRATE AND ACETAMINOPHEN 1 TABLET: 5; 325 TABLET ORAL at 12:55

## 2022-01-15 RX ADMIN — ROPINIROLE HYDROCHLORIDE 0.5 MG: 0.5 TABLET, FILM COATED ORAL at 08:13

## 2022-01-15 RX ADMIN — INSULIN LISPRO 4 UNITS: 100 INJECTION, SOLUTION INTRAVENOUS; SUBCUTANEOUS at 08:14

## 2022-01-15 NOTE — PROGRESS NOTES
Dedicated to Hospital Care    [phone]   LOS: 6 days     Name: Zaina Martinez  Age/Sex: 56 y.o. female  :  1965        PCP: Karson Oreilly MD  Chief Complaint   Patient presents with   • Flank Pain      Subjective   She feels well today.  Pain is controlled.  She denies any nausea vomiting abdominal pain or other symptoms presently.  She concerned about when dialysis will happen and she will get it out at the hospital following completion of dialysis.  Per nursing staff dialysis is scheduled for late this afternoon.  General: No Fever or Chills, Cardiac: No Chest Pain or Palpitations, Resp: No Cough or SOA, GI: No Nausea, Vomiting, or Diarrhea and Other: No bleeding    amLODIPine, 5 mg, Oral, Daily  aspirin, 81 mg, Oral, Daily  epoetin brooke/brooke-epbx, 10,000 Units, Subcutaneous, Once per day on   folic acid, 1 mg, Oral, Daily  furosemide, 40 mg, Oral, BID  insulin lispro, 0-9 Units, Subcutaneous, TID With Meals  insulin lispro, 3 Units, Subcutaneous, TID With Meals  levothyroxine, 125 mcg, Oral, Daily  metoprolol tartrate, 25 mg, Oral, BID  multivitamin with minerals, 1 tablet, Oral, Daily  OLANZapine, 5 mg, Oral, BID  pantoprazole, 40 mg, Oral, Daily  rOPINIRole, 0.5 mg, Oral, Q12H  sertraline, 100 mg, Oral, Daily  tamsulosin, 0.4 mg, Oral, Daily           Objective   Vital Signs  Temp:  [97.8 °F (36.6 °C)-98 °F (36.7 °C)] 98 °F (36.7 °C)  Heart Rate:  [47-50] 50  Resp:  [16] 16  BP: (139-188)/(70-87) 188/86  Body mass index is 28.71 kg/m².    Intake/Output Summary (Last 24 hours) at 1/15/2022 0833  Last data filed at 2022 1400  Gross per 24 hour   Intake 240 ml   Output --   Net 240 ml       Physical Exam  Vitals and nursing note reviewed.   Constitutional:       Appearance: Normal appearance.   HENT:      Head: Normocephalic and atraumatic.   Cardiovascular:      Rate and Rhythm: Normal rate and regular rhythm.   Pulmonary:      Effort: Pulmonary effort is normal.      Breath  sounds: Normal breath sounds.   Abdominal:      General: Bowel sounds are normal.      Palpations: Abdomen is soft.   Neurological:      General: No focal deficit present.      Mental Status: She is alert and oriented to person, place, and time.   Psychiatric:         Mood and Affect: Mood normal.         Behavior: Behavior normal.           Results Review:       I reviewed the patient's new clinical results.  Results from last 7 days   Lab Units 01/14/22  0952 01/13/22  0643 01/12/22  1728 01/12/22  0808 01/11/22  0746 01/10/22  0626 01/09/22  0833   WBC 10*3/mm3 11.52* 12.20*  --  13.84* 16.52* 10.91* 11.39*   HEMOGLOBIN g/dL 8.2* 8.0* 7.7* 7.6* 6.1* 8.2* 9.2*   PLATELETS 10*3/mm3 244 223  --  196 264 233 244     Results from last 7 days   Lab Units 01/14/22  0952 01/13/22  0643 01/12/22  0808 01/11/22  0746 01/10/22  0626 01/09/22  0946   SODIUM mmol/L 131* 133* 133* 134* 132* 135*   POTASSIUM mmol/L 4.6 4.5 4.2 4.4 4.7 4.6   CHLORIDE mmol/L 99 98 98 99 96* 96*   CO2 mmol/L 21.4* 20.0* 20.4* 18.5* 21.3* 22.7   BUN mg/dL 23* 28* 20 28* 51* 45*   CREATININE mg/dL 3.20* 3.68* 2.53* 3.02* 4.03* 3.57*   CALCIUM mg/dL 8.0* 7.7* 7.3* 7.6* 8.1* 8.2*   Estimated Creatinine Clearance: 18.1 mL/min (A) (by C-G formula based on SCr of 3.2 mg/dL (H)).    Lab Results   Component Value Date    HGBA1C 6.90 (H) 01/10/2022    HGBA1C 7.60 (H) 12/20/2021    HGBA1C 9.78 (H) 11/30/2021     Glucose   Date/Time Value Ref Range Status   01/15/2022 1129 263 (H) 70 - 130 mg/dL Final     Comment:     Meter: [id number] : [id number] Mehul Silvestre    01/15/2022 0629 241 (H) 70 - 130 mg/dL Final     Comment:     Meter: [id number] : [id number] Sunni Duenas    01/14/2022 2209 201 (H) 70 - 130 mg/dL Final     Comment:     Meter: [id number] : [id number] Moellerdeclan Mathiasdy    01/14/2022 1609 144 (H) 70 - 130 mg/dL Final     Comment:     Meter: [id number] : [id number] Mehul Silvestre    01/14/2022 1100 177 (H) 70 - 130 mg/dL Final      Comment:     Meter: [id number] : [id number] Mehul SAAVEDRA   01/14/2022 0644 158 (H) 70 - 130 mg/dL Final     Comment:     Meter: [id number] : [id number] Jay Turk NA   01/13/2022 1120 172 (H) 70 - 130 mg/dL Final     Comment:     Meter: [id number] : [id number] Mehul Silvestre NA   01/13/2022 0607 167 (H) 70 - 130 mg/dL Final     Comment:     Meter: [id number] : [id number] Jay SAAVEDRA       Assessment/Plan   Active Hospital Problems    Diagnosis  POA   • **Calculus of gallbladder with acute on chronic cholecystitis without obstruction [K80.12]  Yes   • Anemia due to chronic kidney disease, on chronic dialysis ([facility]) [N18.6, D63.1, Z99.2]  Not Applicable   • Pyelonephritis [N12]  Yes   • Pleural effusion on right [J90]  Yes   • Acute on chronic diastolic CHF (congestive heart failure) ([facility]) [I50.33]  Yes   • ESRD (end stage renal disease) ([facility]) [N18.6]  Yes   • CAD (coronary artery disease) [I25.10]  Yes   • HTN (hypertension) [I10]  Yes   • Type 2 diabetes mellitus with hyperglycemia, with long-term current use of insulin ([facility]) [E11.65, Z79.4]  Not Applicable   • Rheumatoid arthritis ([facility]) [M06.9]  Yes      Resolved Hospital Problems   No resolved problems to display.       PLAN  This is a 56-year-old female admitted with acute on chronic cholecystitis without obstruction and gallstones he underwent a laparoscopic cholecystectomy with intraoperative cholangiogram.  -Her postoperative course has been relatively uneventful  -Plan for dialysis today  -No medical contraindication to discharge after dialysis felt stable from our perspective discharge orders were completed yesterday and remain active  -Mild left leukocytosis probably somewhat reactive no evidence of infection  -Noted anemia chronic disease secondary to renal failure hemoglobin remained stable postprocedure  -Blood sugars reviewed from yesterday stable a little elevated this morning and this afternoon.  Patient can resume her home  insulin regimen at discharge and follow-up with her primary care physician outpatient in 2 weeks for further  -Mechanical DVT prophylaxis  -Full code      Disposition        Osmar King MD  [facility]  01/15/22  08:33 EST

## 2022-01-15 NOTE — PLAN OF CARE
Problem: Adult Inpatient Plan of Care  Goal: Absence of Hospital-Acquired Illness or Injury  Intervention: Identify and Manage Fall Risk  Recent Flowsheet Documentation  Taken 1/15/2022 0840 by Amanda Baker RN  Safety Promotion/Fall Prevention:   activity supervised   fall prevention program maintained   safety round/check completed  Intervention: Prevent Skin Injury  Recent Flowsheet Documentation  Taken 1/15/2022 0840 by Amanda Baker RN  Body Position: position changed independently  Skin Protection: adhesive use limited     Problem: Adult Inpatient Plan of Care  Goal: Absence of Hospital-Acquired Illness or Injury  Intervention: Identify and Manage Fall Risk  Recent Flowsheet Documentation  Taken 1/15/2022 0840 by Amanda Baker RN  Safety Promotion/Fall Prevention:   activity supervised   fall prevention program maintained   safety round/check completed   Goal Outcome Evaluation:

## 2022-01-15 NOTE — PROGRESS NOTES
[facility] Progress Note      Patient Name: Zaina Martinez  : 1965  MRN: [id number]  Primary Care Physician:  Karson Oreilly MD  Date of admission: 2022    Subjective     Interval History:   Doing well overall  plan for hemodialysis later today  no complaints at this time    Review of Systems:   As noted above    Objective     Vitals:   Temp:  [97.8 °F (36.6 °C)-98 °F (36.7 °C)] 97.8 °F (36.6 °C)  Heart Rate:  [48-50] 50  Resp:  [16] 16  BP: (153-188)/(72-87) 172/82  Flow (L/min):  [2] 2    Intake/Output Summary (Last 24 hours) at 1/15/2022 1319  Last data filed at 2022 1400  Gross per 24 hour   Intake 240 ml   Output --   Net 240 ml       Physical Exam:    General Appearance: Sleepy in no acute distress  Skin: warm and dry  HEENT: oral mucosa normal, nonicteric sclera  Neck: supple, no JVD  Lungs: CTA  Heart: RRR, normal S1 and S2  Abdomen: soft, nontender, nondistended  Extremities: no edema      Scheduled Meds:     amLODIPine, 5 mg, Oral, Daily  aspirin, 81 mg, Oral, Daily  epoetin brooke/brooke-epbx, 10,000 Units, Subcutaneous, Once per day on   folic acid, 1 mg, Oral, Daily  furosemide, 40 mg, Oral, BID  insulin lispro, 0-9 Units, Subcutaneous, TID With Meals  insulin lispro, 3 Units, Subcutaneous, TID With Meals  levothyroxine, 125 mcg, Oral, Daily  metoprolol tartrate, 25 mg, Oral, BID  multivitamin with minerals, 1 tablet, Oral, Daily  OLANZapine, 5 mg, Oral, BID  pantoprazole, 40 mg, Oral, Daily  rOPINIRole, 0.5 mg, Oral, Q12H  sertraline, 100 mg, Oral, Daily  tamsulosin, 0.4 mg, Oral, Daily      IV Meds:        Results Reviewed:   I have personally reviewed the results from the time of this admission to 1/15/2022 13:19 EST     Results from last 7 days   Lab Units 22  0952 22  0643 22  0808   SODIUM mmol/L 131* 133* 133*   POTASSIUM mmol/L 4.6 4.5 4.2   CHLORIDE mmol/L 99 98 98   CO2 mmol/L 21.4* 20.0* 20.4*   BUN mg/dL 23* 28* 20    CREATININE mg/dL 3.20* 3.68* 2.53*   CALCIUM mg/dL 8.0* 7.7* 7.3*   BILIRUBIN mg/dL 2.8* 2.4* 1.6*   ALK PHOS U/L 752* 692* 525*   ALT (SGPT) U/L 35* 37* 35*   AST (SGOT) U/L 61* 58* 56*   GLUCOSE mg/dL 175* 167* 241*     Estimated Creatinine Clearance: 18.1 mL/min (A) (by C-G formula based on SCr of 3.2 mg/dL (H)).          Results from last 7 days   Lab Units 01/14/22  0952 01/13/22  0643 01/12/22  1728 01/12/22  0808 01/11/22  0746 01/10/22  0626 01/10/22  0626   WBC 10*3/mm3 11.52* 12.20*  --  13.84* 16.52*  --  10.91*   HEMOGLOBIN g/dL 8.2* 8.0* 7.7* 7.6* 6.1*   < > 8.2*   PLATELETS 10*3/mm3 244 223  --  196 264  --  233    < > = values in this interval not displayed.           Assessment / Plan     ASSESSMENT:     ESRD-  Normal dialysis at [facility].  [facility] for access.    Volume Overload  Acute cholecystitis: Had cholecystectomy 01/10/2021  Anemia-  Monitor, not far from goal. Procrit with HD  HTN  RLS  DMII     PLAN:     -Hemodialysis today per schedule for metabolic clearance and ultrafiltration  -periodic surveillance labs    Randy Steven MD  01/15/22  13:19 EST    [facility]  [phone]

## 2022-01-15 NOTE — PLAN OF CARE
Goal Outcome Evaluation:  Plan of Care Reviewed With: patient        Progress: no change  Outcome Summary: Pt slept throughout the shift. c/o pain to abd and Norco given with good results. Bradycardia noted and Lopressor held. Will CTM.

## 2022-01-16 ENCOUNTER — READMISSION MANAGEMENT (OUTPATIENT)
Dept: CALL CENTER | Facility: HOSPITAL | Age: 57
End: 2022-01-16

## 2022-01-16 NOTE — OUTREACH NOTE
Prep Survey      Responses   [facility] facility patient discharged from? [facility]   Is LACE score < 7 ? No   Emergency Room discharge w/ pulse ox? No   Eligibility Readm Mgmt   Discharge diagnosis Calculus of gallbladder with acute on chronic cholecystitis without obstruction laparoscopic cholecystectomy   Does the patient have one of the following disease processes/diagnoses(primary or secondary)? General Surgery   Does the patient have Home health ordered? Yes   What is the Home health agency?  [facility]    Is there a DME ordered? No   Comments regarding appointments [employer]    Prep survey completed? Yes          Hazel Vicente RN

## 2022-01-17 NOTE — CASE MANAGEMENT/SOCIAL WORK
Case Management Discharge Note      Final Note: DC home    Provided Post Acute Provider List?: N/A  N/A Provider List Comment: Has no preference of  agency ..........[facility]  Provided Post Acute Provider Quality & Resource List?: N/A    Selected Continued Care - Discharged on 1/15/2022 Admission date: 1/9/2022 - Discharge disposition: Home or Self Care    Destination    No services have been selected for the patient.              Durable Medical Equipment    No services have been selected for the patient.              Dialysis/Infusion     Service Provider Selected Services Address Phone Fax Patient Preferred    [facility] [address]  Dialysis [address] [facility] [address] [phone] --          Home Medical Care     Service Provider Selected Services Address Phone Fax Patient Preferred    [facility]  Home Health Services [address] [address] [phone] --          Therapy    No services have been selected for the patient.              Community Resources    No services have been selected for the patient.              Community & DME    No services have been selected for the patient.                Selected Continued Care - Prior Encounters Includes selections from prior encounters from 10/11/2021 to 1/15/2022    Discharged on 12/29/2021 Admission date: 12/19/2021 - Discharge disposition: Skilled Nursing Facility (DC - External)    Destination     Service Provider Selected Services Address Phone Fax Patient Preferred    [facility]  Skilled Nursing [address] [facility] [address] [phone] --                Discharged on 12/17/2021 Admission date: 11/29/2021 - Discharge disposition: Home or Self Care    Durable Medical Equipment     Service Provider Selected Services Address Phone Fax Patient Preferred    [facility]  Durable Medical Equipment [address],  [facility] [address] [phone] --          Dialysis/Infusion     Service Provider Selected Services Address Phone Fax Patient Preferred    [facility]  Dialysis [address] [facility] [address] [phone] --                         Final Discharge Disposition Code: 01 - home or self-care

## 2022-01-18 ENCOUNTER — HOME HEALTH ADMISSION (OUTPATIENT)
Dept: HOME HEALTH SERVICES | Facility: HOME HEALTHCARE | Age: 57
End: 2022-01-18

## 2022-01-19 ENCOUNTER — READMISSION MANAGEMENT (OUTPATIENT)
Dept: CALL CENTER | Facility: HOSPITAL | Age: 57
End: 2022-01-19

## 2022-01-19 NOTE — OUTREACH NOTE
General Surgery Week 1 Survey      Responses   [facility] patient discharged from? [place]   Does the patient have one of the following disease processes/diagnoses(primary or secondary)? General Surgery   Week 1 attempt successful? Yes   Call start time 1217   Call end time 1246   Discharge diagnosis Calculus of gallbladder with acute on chronic cholecystitis without obstruction laparoscopic cholecystectomy   Is patient permission given to speak with other caregiver? Yes   Person spoke with today (if not patient) and relationship Marv   Meds reviewed with patient/caregiver? Yes   Is the patient having any side effects they believe may be caused by any medication additions or changes? No   Does the patient have all medications related to this admission filled (includes all antibiotics, pain medications, etc.) Yes   Is the patient taking all medications as directed (includes completed medication regime)? Yes   Does the patient have a follow up appointment scheduled with their surgeon? Yes   Has the patient kept scheduled appointments due by today? Yes   What is the Home health agency?  [facility]    Has home health visited the patient within 72 hours of discharge? Call prior to 72 hours   What DME was ordered? Rolling walker per [facility]   Psychosocial issues? No   Did the patient receive a copy of their discharge instructions? Yes   Nursing interventions Reviewed instructions with patient   What is the patient's perception of their health status since discharge? Same   Nursing interventions Nurse provided patient education   Is the patient /caregiver able to teach back basic post-op care? Continue use of incentive spirometry at least 1 week post discharge,  Practice 'cough and deep breath',  Drive as instructed by MD in discharge instructions,  Keep incision areas clean,dry and protected   Is the patient/caregiver able to teach back signs and symptoms of incisional infection? Increased redness,  swelling or pain at the incisonal site,  Increased drainage or bleeding,  Incisional warmth,  Fever,  Pus or odor from incision   Is the patient/caregiver able to teach back steps to recovery at home? Set small, achievable goals for return to baseline health,  Rest and rebuild strength, gradually increase activity,  Eat a well-balance diet,  Make a list of questions for surgeon's appointment   If the patient is a current smoker, are they able to teach back resources for cessation? Not a smoker   Is the patient/caregiver able to teach back the hierarchy of who to call/visit for symptoms/problems? PCP, Specialist, Home health nurse, Urgent Care, ED, 911 Yes   Additional teach back comments She has fallen several times, he says her legs give out. PCP appt next Monday but refuses to write HH orders till f/u visit. Says her wounds are fine.  Says she is swelling and dialysis not until late today. Encouraged asking for earlier appts at PCP and dialysis, gave # to pt hub to change PCP as requested. Will discuss lasix with nephrology.   Week 1 call completed? Yes   Wrap up additional comments She is essentially the same he says.          Daija Preston RN

## 2022-01-24 ENCOUNTER — OFFICE VISIT (OUTPATIENT)
Dept: SURGERY | Facility: CLINIC | Age: 57
End: 2022-01-24

## 2022-01-24 VITALS — BODY MASS INDEX: 28.97 KG/M2 | HEIGHT: 62 IN | WEIGHT: 157.4 LBS

## 2022-01-24 DIAGNOSIS — K81.9 CHOLECYSTITIS: Primary | ICD-10-CM

## 2022-01-24 PROBLEM — E78.5 HYPERLIPIDEMIA: Status: ACTIVE | Noted: 2018-11-30

## 2022-01-24 PROBLEM — I10 HYPERTENSIVE DISORDER: Status: ACTIVE | Noted: 2021-08-25

## 2022-01-24 PROBLEM — D72.829 LEUKOCYTOSIS: Status: ACTIVE | Noted: 2018-03-16

## 2022-01-24 PROBLEM — E86.0 LUETSCHER'S SYNDROME: Status: ACTIVE | Noted: 2021-03-29

## 2022-01-24 PROBLEM — M25.559 HIP PAIN: Status: ACTIVE | Noted: 2020-06-19

## 2022-01-24 PROBLEM — R06.00 DYSPNEA: Status: ACTIVE | Noted: 2019-11-15

## 2022-01-24 PROBLEM — F32.A DEPRESSIVE DISORDER: Status: ACTIVE | Noted: 2018-10-31

## 2022-01-24 PROBLEM — E11.8: Status: ACTIVE | Noted: 2018-05-01

## 2022-01-24 PROBLEM — R19.7 DIARRHEA OF PRESUMED INFECTIOUS ORIGIN: Status: ACTIVE | Noted: 2021-03-30

## 2022-01-24 PROBLEM — N28.9 RENAL INSUFFICIENCY: Status: ACTIVE | Noted: 2018-03-16

## 2022-01-24 PROBLEM — M25.519 SHOULDER PAIN: Status: ACTIVE | Noted: 2020-06-19

## 2022-01-24 PROBLEM — Z86.39 H/O: HYPOTHYROIDISM: Status: ACTIVE | Noted: 2021-08-25

## 2022-01-24 PROBLEM — R30.0 DIFFICULT OR PAINFUL URINATION: Status: ACTIVE | Noted: 2020-08-11

## 2022-01-24 PROBLEM — R45.1 AGITATION: Status: ACTIVE | Noted: 2021-03-30

## 2022-01-24 PROBLEM — M06.9 RHEUMATOID ARTHRITIS: Status: ACTIVE | Noted: 2021-03-30

## 2022-01-24 PROBLEM — K22.4 ESOPHAGEAL DYSMOTILITY: Status: ACTIVE | Noted: 2021-10-15

## 2022-01-24 PROBLEM — Z23 NEED FOR INFLUENZA VACCINATION: Status: ACTIVE | Noted: 2018-11-30

## 2022-01-24 PROBLEM — R93.89 ABNORMAL FINDINGS ON DIAGNOSTIC IMAGING OF OTHER SPECIFIED BODY STRUCTURES: Status: ACTIVE | Noted: 2017-04-04

## 2022-01-24 PROBLEM — R11.2 INTRACTABLE VOMITING WITH NAUSEA: Status: ACTIVE | Noted: 2021-03-29

## 2022-01-24 PROBLEM — N39.0 URINARY TRACT INFECTIOUS DISEASE: Status: ACTIVE | Noted: 2018-07-17

## 2022-01-24 PROBLEM — D64.9 ANEMIA: Status: ACTIVE | Noted: 2018-03-16

## 2022-01-24 PROBLEM — Z91.199 NONCOMPLIANCE WITH TREATMENT: Status: ACTIVE | Noted: 2018-10-31

## 2022-01-24 PROBLEM — L03.039 CELLULITIS OF TOE: Status: ACTIVE | Noted: 2019-11-15

## 2022-01-24 PROBLEM — Z09 ENCOUNTER FOR FOLLOW-UP EXAMINATION AFTER COMPLETED TREATMENT FOR CONDITIONS OTHER THAN MALIGNANT NEOPLASM: Status: ACTIVE | Noted: 2017-02-13

## 2022-01-24 PROBLEM — J06.9 ACUTE UPPER RESPIRATORY INFECTION: Status: ACTIVE | Noted: 2018-10-31

## 2022-01-24 PROBLEM — J18.9 COMMUNITY ACQUIRED PNEUMONIA: Status: ACTIVE | Noted: 2017-02-13

## 2022-01-24 PROBLEM — E83.42 HYPOMAGNESEMIA: Status: ACTIVE | Noted: 2021-03-30

## 2022-01-24 PROBLEM — R82.998 CASTS PRESENT IN URINE: Status: ACTIVE | Noted: 2021-03-29

## 2022-01-24 PROBLEM — G25.81 RESTLESS LEGS: Status: ACTIVE | Noted: 2019-11-15

## 2022-01-24 PROBLEM — R74.8 ALKALINE PHOSPHATASE RAISED: Status: ACTIVE | Noted: 2018-03-16

## 2022-01-24 PROBLEM — U07.1 DISEASE DUE TO SEVERE ACUTE RESPIRATORY SYNDROME CORONAVIRUS 2 (SARS-COV-2): Status: ACTIVE | Noted: 2021-03-30

## 2022-01-24 PROCEDURE — 99024 POSTOP FOLLOW-UP VISIT: CPT | Performed by: SURGERY

## 2022-01-24 NOTE — PROGRESS NOTES
CC: Postoperative appointment    History of presenting illness:   This is a 56-year-old female who presents today for a postoperative appointment. She had a laparoscopic cholecystectomy on 1/10/2022.  She states she is having pain in her right lower quadrant. She fell the day after being home from the hospital and is unsure if her pain is related to her fall. She reports her appetite is decent, she has some nausea but denies any vomiting. She states initially she had diarrhea and then constipation. She took MiraLAX and had a normal bowel movement yesterday.  She states overall she is doing okay from surgery and is slowly improving. She is still undergoing dialysis weekly.    Surgical History: Laparoscopic cholecystectomy with intraoperative cholangiogram 1/10/2022    Pathology: Cholelithiasis, chronic cholecystitis, benign lymph node    Review of Systems:  Constitutional: No fever, chills, reports weight loss  Neck: no swollen glands or dysphagia or odynophagia  Respiratory: negative for SOB, cough, hemoptysis or wheezing  Cardiovascular: negative for chest pain, palpitations or peripheral edema  Gastrointestinal: Right lower quadrant pain, reports nausea, denies vomiting, diarrhea, or constipation    Physical Exam:  BMI: 28.79  General: alert and oriented, appropriate, no acute distress  Eyes: No scleral icterus, extraocular movements are intact  Neck: Supple without lymphadenopathy or thyromegaly, trachea is in the midline  Respiratory: There is good bilateral chest expansion, no use of accessory muscles is noted  Cardiovascular: No jugular venous distention or peripheral edema is seen  Gastrointestinal: Tenderness to palpation in the right lower quadrant, incisions are healing well, no erythema, warmth, or drainage    Assessment and plan:   S/p cholecystectomy, cholelithiasis and chronic cholecystitis: Patient is doing well overall, slowly recovering. Advised she has no restrictions and can proceed with activity  as tolerated. Advised her to continue following up with her PCP and nephrologist. She can follow-up in our office as needed.    Amparo Maldonado PA-C  General Surgery     [facility]  [address], [address]  [place] [id number]  P: [phone]  F: [phone]

## 2022-01-27 ENCOUNTER — READMISSION MANAGEMENT (OUTPATIENT)
Dept: CALL CENTER | Facility: HOSPITAL | Age: 57
End: 2022-01-27

## 2022-01-27 NOTE — OUTREACH NOTE
General Surgery Week 2 Survey      Responses   [facility] patient discharged from? [place]   Does the patient have one of the following disease processes/diagnoses(primary or secondary)? General Surgery   Week 2 attempt successful? Yes   Call start time 1601   Call end time 1604   Discharge diagnosis Calculus of gallbladder with acute on chronic cholecystitis without obstruction laparoscopic cholecystectomy   Person spoke with today (if not patient) and relationship patient   Meds reviewed with patient/caregiver? Yes   Is the patient taking all medications as directed (includes completed medication regime)? Yes   Does the patient have a follow up appointment scheduled with their surgeon? Yes   Has the patient kept scheduled appointments due by today? Yes   Psychosocial issues? No   What is the patient's perception of their health status since discharge? Improving   Is the patient/caregiver able to teach back signs and symptoms of incisional infection? Increased redness, swelling or pain at the incisonal site,  Increased drainage or bleeding,  Fever  [Patient reports incisions healing well, no issues. ]   Is the patient/caregiver able to teach back steps to recovery at home? Set small, achievable goals for return to baseline health,  Rest and rebuild strength, gradually increase activity   If the patient is a current smoker, are they able to teach back resources for cessation? Not a smoker   Is the patient/caregiver able to teach back the hierarchy of who to call/visit for symptoms/problems? PCP, Specialist, Home health nurse, Urgent Care, ED, 911 Yes   Week 2 call completed? Yes          Jeannette Gallegos RN

## 2022-02-03 ENCOUNTER — READMISSION MANAGEMENT (OUTPATIENT)
Dept: CALL CENTER | Facility: HOSPITAL | Age: 57
End: 2022-02-03

## 2022-02-03 NOTE — OUTREACH NOTE
General Surgery Week 3 Survey      Responses   [facility] patient discharged from? [place]   Does the patient have one of the following disease processes/diagnoses(primary or secondary)? General Surgery   Week 3 attempt successful? Yes   Call start time 1247   Call end time 1254   Discharge diagnosis Calculus of gallbladder with acute on chronic cholecystitis without obstruction laparoscopic cholecystectomy   Meds reviewed with patient/caregiver? Yes   Is the patient taking all medications as directed (includes completed medication regime)? Yes   Has the patient kept scheduled appointments due by today? Yes   Comments Pt has followed up with PCP however she's looking for new PCP   What is the patient's perception of their health status since discharge? Improving  [Pt states she's having difficulty getting her breath every once in a while]   Is the patient/caregiver able to teach back signs and symptoms of incisional infection? Fever   Is the patient/caregiver able to teach back steps to recovery at home? Rest and rebuild strength, gradually increase activity,  Eat a well-balance diet   Week 3 call completed? Yes          Della Huff RN

## 2022-02-11 ENCOUNTER — READMISSION MANAGEMENT (OUTPATIENT)
Dept: CALL CENTER | Facility: HOSPITAL | Age: 57
End: 2022-02-11

## 2022-02-12 NOTE — OUTREACH NOTE
General Surgery Week 4 Survey      Responses   [facility] patient discharged from? [place]   Does the patient have one of the following disease processes/diagnoses(primary or secondary)? General Surgery   Week 4 attempt successful? Yes   Call start time 1832   Call end time 1835   Is the patient taking all medications as directed (includes completed medication regime)? Yes   Has the patient kept scheduled appointments due by today? No   Is the patient still receiving Home Health Services? N/A   What is the patient's perception of their health status since discharge? Improving   Week 4 call completed? Yes   Would the patient like one additional call? No   Graduated Yes   Is the patient interested in additional calls from an ambulatory ?  NOTE:  applies to high risk patients requiring additional follow-up. No   Did the patient feel the follow up calls were helpful during their recovery period? Yes   Was the number of calls appropriate? Yes   Wrap up additional comments doing well, but has fallen a couple of times          Amee Bedoya RN

## 2022-02-23 ENCOUNTER — HOSPITAL ENCOUNTER (INPATIENT)
Facility: HOSPITAL | Age: 57
LOS: 7 days | Discharge: HOME HEALTH | End: 2022-03-02
Attending: EMERGENCY MEDICINE | Admitting: INTERNAL MEDICINE

## 2022-02-23 ENCOUNTER — APPOINTMENT (OUTPATIENT)
Dept: CT IMAGING | Facility: HOSPITAL | Age: 57
End: 2022-02-23

## 2022-02-23 ENCOUNTER — APPOINTMENT (OUTPATIENT)
Dept: GENERAL RADIOLOGY | Facility: HOSPITAL | Age: 57
End: 2022-02-23

## 2022-02-23 DIAGNOSIS — N18.6 ESRD (END STAGE RENAL DISEASE): ICD-10-CM

## 2022-02-23 DIAGNOSIS — R41.82 ALTERED MENTAL STATUS, UNSPECIFIED ALTERED MENTAL STATUS TYPE: Primary | ICD-10-CM

## 2022-02-23 DIAGNOSIS — R93.5 ABNORMAL ABDOMINAL CT SCAN: ICD-10-CM

## 2022-02-23 LAB
ALBUMIN SERPL-MCNC: 3 G/DL (ref 3.5–5.2)
ALBUMIN/GLOB SERPL: 0.7 G/DL
ALP SERPL-CCNC: 1330 U/L (ref 39–117)
ALT SERPL W P-5'-P-CCNC: 17 U/L (ref 1–33)
AMMONIA BLD-SCNC: 21 UMOL/L (ref 11–51)
AMPHET+METHAMPHET UR QL: NEGATIVE
ANION GAP SERPL CALCULATED.3IONS-SCNC: 17 MMOL/L (ref 5–15)
AST SERPL-CCNC: 61 U/L (ref 1–32)
BACTERIA UR QL AUTO: ABNORMAL /HPF
BARBITURATES UR QL SCN: NEGATIVE
BASOPHILS # BLD AUTO: 0.08 10*3/MM3 (ref 0–0.2)
BASOPHILS NFR BLD AUTO: 0.5 % (ref 0–1.5)
BENZODIAZ UR QL SCN: POSITIVE
BILIRUB SERPL-MCNC: 1.5 MG/DL (ref 0–1.2)
BILIRUB UR QL STRIP: NEGATIVE
BUN SERPL-MCNC: 21 MG/DL (ref 6–20)
BUN/CREAT SERPL: 6.1 (ref 7–25)
CALCIUM SPEC-SCNC: 8.3 MG/DL (ref 8.6–10.5)
CANNABINOIDS SERPL QL: NEGATIVE
CHLORIDE SERPL-SCNC: 91 MMOL/L (ref 98–107)
CLARITY UR: CLEAR
CO2 SERPL-SCNC: 25 MMOL/L (ref 22–29)
COCAINE UR QL: NEGATIVE
COLOR UR: ABNORMAL
CREAT SERPL-MCNC: 3.45 MG/DL (ref 0.57–1)
DEPRECATED RDW RBC AUTO: 56.8 FL (ref 37–54)
EOSINOPHIL # BLD AUTO: 0.04 10*3/MM3 (ref 0–0.4)
EOSINOPHIL NFR BLD AUTO: 0.3 % (ref 0.3–6.2)
ERYTHROCYTE [DISTWIDTH] IN BLOOD BY AUTOMATED COUNT: 18 % (ref 12.3–15.4)
ETHANOL BLD-MCNC: <10 MG/DL (ref 0–10)
ETHANOL UR QL: <0.01 %
GFR SERPL CREATININE-BSD FRML MDRD: 14 ML/MIN/1.73
GLOBULIN UR ELPH-MCNC: 4.2 GM/DL
GLUCOSE SERPL-MCNC: 126 MG/DL (ref 65–99)
GLUCOSE UR STRIP-MCNC: ABNORMAL MG/DL
HCT VFR BLD AUTO: 34.9 % (ref 34–46.6)
HGB BLD-MCNC: 11.3 G/DL (ref 12–15.9)
HGB UR QL STRIP.AUTO: ABNORMAL
HYALINE CASTS UR QL AUTO: ABNORMAL /LPF
IMM GRANULOCYTES # BLD AUTO: 0.09 10*3/MM3 (ref 0–0.05)
IMM GRANULOCYTES NFR BLD AUTO: 0.6 % (ref 0–0.5)
KETONES UR QL STRIP: ABNORMAL
LEUKOCYTE ESTERASE UR QL STRIP.AUTO: NEGATIVE
LYMPHOCYTES # BLD AUTO: 2 10*3/MM3 (ref 0.7–3.1)
LYMPHOCYTES NFR BLD AUTO: 13 % (ref 19.6–45.3)
MCH RBC QN AUTO: 28 PG (ref 26.6–33)
MCHC RBC AUTO-ENTMCNC: 32.4 G/DL (ref 31.5–35.7)
MCV RBC AUTO: 86.6 FL (ref 79–97)
METHADONE UR QL SCN: NEGATIVE
MONOCYTES # BLD AUTO: 1.03 10*3/MM3 (ref 0.1–0.9)
MONOCYTES NFR BLD AUTO: 6.7 % (ref 5–12)
NEUTROPHILS NFR BLD AUTO: 12.13 10*3/MM3 (ref 1.7–7)
NEUTROPHILS NFR BLD AUTO: 78.9 % (ref 42.7–76)
NITRITE UR QL STRIP: NEGATIVE
NRBC BLD AUTO-RTO: 0 /100 WBC (ref 0–0.2)
OPIATES UR QL: NEGATIVE
OXYCODONE UR QL SCN: POSITIVE
PH UR STRIP.AUTO: 8 [PH] (ref 5–8)
PLATELET # BLD AUTO: 322 10*3/MM3 (ref 140–450)
PMV BLD AUTO: 10.2 FL (ref 6–12)
POTASSIUM SERPL-SCNC: 4.8 MMOL/L (ref 3.5–5.2)
PROT SERPL-MCNC: 7.2 G/DL (ref 6–8.5)
PROT UR QL STRIP: ABNORMAL
QT INTERVAL: 508 MS
RBC # BLD AUTO: 4.03 10*6/MM3 (ref 3.77–5.28)
RBC # UR STRIP: ABNORMAL /HPF
REF LAB TEST METHOD: ABNORMAL
SARS-COV-2 ORF1AB RESP QL NAA+PROBE: NOT DETECTED
SODIUM SERPL-SCNC: 133 MMOL/L (ref 136–145)
SP GR UR STRIP: 1.02 (ref 1–1.03)
SQUAMOUS #/AREA URNS HPF: ABNORMAL /HPF
UROBILINOGEN UR QL STRIP: ABNORMAL
WBC # UR STRIP: ABNORMAL /HPF
WBC NRBC COR # BLD: 15.37 10*3/MM3 (ref 3.4–10.8)

## 2022-02-23 PROCEDURE — 87040 BLOOD CULTURE FOR BACTERIA: CPT | Performed by: INTERNAL MEDICINE

## 2022-02-23 PROCEDURE — 80307 DRUG TEST PRSMV CHEM ANLYZR: CPT | Performed by: EMERGENCY MEDICINE

## 2022-02-23 PROCEDURE — 82140 ASSAY OF AMMONIA: CPT | Performed by: EMERGENCY MEDICINE

## 2022-02-23 PROCEDURE — 85025 COMPLETE CBC W/AUTO DIFF WBC: CPT | Performed by: EMERGENCY MEDICINE

## 2022-02-23 PROCEDURE — U0004 COV-19 TEST NON-CDC HGH THRU: HCPCS | Performed by: EMERGENCY MEDICINE

## 2022-02-23 PROCEDURE — 74176 CT ABD & PELVIS W/O CONTRAST: CPT

## 2022-02-23 PROCEDURE — 82077 ASSAY SPEC XCP UR&BREATH IA: CPT | Performed by: EMERGENCY MEDICINE

## 2022-02-23 PROCEDURE — 80053 COMPREHEN METABOLIC PANEL: CPT | Performed by: EMERGENCY MEDICINE

## 2022-02-23 PROCEDURE — 99284 EMERGENCY DEPT VISIT MOD MDM: CPT

## 2022-02-23 PROCEDURE — 93010 ELECTROCARDIOGRAM REPORT: CPT | Performed by: INTERNAL MEDICINE

## 2022-02-23 PROCEDURE — 70450 CT HEAD/BRAIN W/O DYE: CPT

## 2022-02-23 PROCEDURE — 71045 X-RAY EXAM CHEST 1 VIEW: CPT

## 2022-02-23 PROCEDURE — 25010000002 PIPERACILLIN SOD-TAZOBACTAM PER 1 G: Performed by: INTERNAL MEDICINE

## 2022-02-23 PROCEDURE — 81001 URINALYSIS AUTO W/SCOPE: CPT | Performed by: EMERGENCY MEDICINE

## 2022-02-23 PROCEDURE — 93005 ELECTROCARDIOGRAM TRACING: CPT | Performed by: EMERGENCY MEDICINE

## 2022-02-23 RX ORDER — PANTOPRAZOLE SODIUM 40 MG/1
40 TABLET, DELAYED RELEASE ORAL
Status: DISCONTINUED | OUTPATIENT
Start: 2022-02-24 | End: 2022-03-02 | Stop reason: HOSPADM

## 2022-02-23 RX ORDER — HYDRALAZINE HYDROCHLORIDE 25 MG/1
25 TABLET, FILM COATED ORAL 3 TIMES DAILY
Status: DISCONTINUED | OUTPATIENT
Start: 2022-02-23 | End: 2022-02-25

## 2022-02-23 RX ORDER — ASPIRIN 81 MG/1
81 TABLET ORAL DAILY
Status: DISCONTINUED | OUTPATIENT
Start: 2022-02-24 | End: 2022-03-02 | Stop reason: HOSPADM

## 2022-02-23 RX ORDER — INSULIN LISPRO 100 [IU]/ML
3 INJECTION, SOLUTION INTRAVENOUS; SUBCUTANEOUS
Status: DISCONTINUED | OUTPATIENT
Start: 2022-02-24 | End: 2022-02-24

## 2022-02-23 RX ORDER — SERTRALINE HYDROCHLORIDE 100 MG/1
100 TABLET, FILM COATED ORAL DAILY
Status: DISCONTINUED | OUTPATIENT
Start: 2022-02-24 | End: 2022-03-02 | Stop reason: HOSPADM

## 2022-02-23 RX ORDER — FOLIC ACID 1 MG/1
1 TABLET ORAL DAILY
Status: DISCONTINUED | OUTPATIENT
Start: 2022-02-24 | End: 2022-03-02 | Stop reason: HOSPADM

## 2022-02-23 RX ORDER — FUROSEMIDE 40 MG/1
40 TABLET ORAL 2 TIMES DAILY
Status: DISCONTINUED | OUTPATIENT
Start: 2022-02-23 | End: 2022-03-02 | Stop reason: HOSPADM

## 2022-02-23 RX ORDER — TAMSULOSIN HYDROCHLORIDE 0.4 MG/1
0.4 CAPSULE ORAL DAILY
Status: DISCONTINUED | OUTPATIENT
Start: 2022-02-24 | End: 2022-03-02 | Stop reason: HOSPADM

## 2022-02-23 RX ORDER — UREA 10 %
3 LOTION (ML) TOPICAL NIGHTLY PRN
Status: DISCONTINUED | OUTPATIENT
Start: 2022-02-23 | End: 2022-03-02 | Stop reason: HOSPADM

## 2022-02-23 RX ORDER — ROPINIROLE 0.5 MG/1
0.75 TABLET, FILM COATED ORAL NIGHTLY
Status: DISCONTINUED | OUTPATIENT
Start: 2022-02-23 | End: 2022-03-02 | Stop reason: HOSPADM

## 2022-02-23 RX ORDER — NITROGLYCERIN 0.4 MG/1
0.4 TABLET SUBLINGUAL
Status: DISCONTINUED | OUTPATIENT
Start: 2022-02-23 | End: 2022-03-02 | Stop reason: HOSPADM

## 2022-02-23 RX ORDER — PANTOPRAZOLE SODIUM 40 MG/1
40 TABLET, DELAYED RELEASE ORAL DAILY
Status: ON HOLD | COMMUNITY
End: 2022-07-01 | Stop reason: SDUPTHER

## 2022-02-23 RX ORDER — ACETAMINOPHEN 325 MG/1
650 TABLET ORAL EVERY 4 HOURS PRN
Status: DISCONTINUED | OUTPATIENT
Start: 2022-02-23 | End: 2022-03-02 | Stop reason: HOSPADM

## 2022-02-23 RX ORDER — SODIUM CHLORIDE 9 MG/ML
75 INJECTION, SOLUTION INTRAVENOUS CONTINUOUS
Status: DISCONTINUED | OUTPATIENT
Start: 2022-02-23 | End: 2022-02-24

## 2022-02-23 RX ORDER — DEXTROSE MONOHYDRATE 25 G/50ML
25 INJECTION, SOLUTION INTRAVENOUS
Status: DISCONTINUED | OUTPATIENT
Start: 2022-02-23 | End: 2022-03-02 | Stop reason: HOSPADM

## 2022-02-23 RX ORDER — LEVOTHYROXINE SODIUM 0.12 MG/1
125 TABLET ORAL
Status: DISCONTINUED | OUTPATIENT
Start: 2022-02-24 | End: 2022-03-02 | Stop reason: HOSPADM

## 2022-02-23 RX ORDER — INSULIN LISPRO 100 [IU]/ML
0-9 INJECTION, SOLUTION INTRAVENOUS; SUBCUTANEOUS
Status: DISCONTINUED | OUTPATIENT
Start: 2022-02-24 | End: 2022-02-24

## 2022-02-23 RX ORDER — HYDROCODONE BITARTRATE AND ACETAMINOPHEN 5; 325 MG/1; MG/1
1 TABLET ORAL EVERY 6 HOURS PRN
Status: DISCONTINUED | OUTPATIENT
Start: 2022-02-23 | End: 2022-02-25

## 2022-02-23 RX ORDER — NICOTINE POLACRILEX 4 MG
15 LOZENGE BUCCAL
Status: DISCONTINUED | OUTPATIENT
Start: 2022-02-23 | End: 2022-03-02 | Stop reason: HOSPADM

## 2022-02-23 RX ORDER — MELATONIN
5000 DAILY
Status: DISCONTINUED | OUTPATIENT
Start: 2022-02-24 | End: 2022-03-02 | Stop reason: HOSPADM

## 2022-02-23 RX ORDER — ONDANSETRON 2 MG/ML
4 INJECTION INTRAMUSCULAR; INTRAVENOUS EVERY 6 HOURS PRN
Status: DISCONTINUED | OUTPATIENT
Start: 2022-02-23 | End: 2022-03-02 | Stop reason: HOSPADM

## 2022-02-23 RX ORDER — OLANZAPINE 5 MG/1
5 TABLET ORAL 2 TIMES DAILY
Status: DISCONTINUED | OUTPATIENT
Start: 2022-02-23 | End: 2022-03-02 | Stop reason: HOSPADM

## 2022-02-23 RX ORDER — HYDRALAZINE HYDROCHLORIDE 25 MG/1
25 TABLET, FILM COATED ORAL 3 TIMES DAILY
COMMUNITY
End: 2022-03-02 | Stop reason: HOSPADM

## 2022-02-23 RX ORDER — AMLODIPINE BESYLATE 10 MG/1
10 TABLET ORAL DAILY
Status: DISCONTINUED | OUTPATIENT
Start: 2022-02-24 | End: 2022-03-02 | Stop reason: HOSPADM

## 2022-02-23 RX ORDER — ONDANSETRON 4 MG/1
4 TABLET, FILM COATED ORAL EVERY 6 HOURS PRN
Status: DISCONTINUED | OUTPATIENT
Start: 2022-02-23 | End: 2022-03-02 | Stop reason: HOSPADM

## 2022-02-23 RX ADMIN — OLANZAPINE 5 MG: 5 TABLET ORAL at 23:23

## 2022-02-23 RX ADMIN — FUROSEMIDE 40 MG: 40 TABLET ORAL at 22:32

## 2022-02-23 RX ADMIN — HYDROCODONE BITARTRATE AND ACETAMINOPHEN 1 TABLET: 5; 325 TABLET ORAL at 22:33

## 2022-02-23 RX ADMIN — METOPROLOL TARTRATE 25 MG: 25 TABLET, FILM COATED ORAL at 22:33

## 2022-02-23 RX ADMIN — HYDRALAZINE HYDROCHLORIDE 25 MG: 25 TABLET, FILM COATED ORAL at 22:32

## 2022-02-23 RX ADMIN — TAZOBACTAM SODIUM AND PIPERACILLIN SODIUM 3.38 G: 375; 3 INJECTION, SOLUTION INTRAVENOUS at 22:41

## 2022-02-23 RX ADMIN — ROPINIROLE HYDROCHLORIDE 0.75 MG: 0.5 TABLET, FILM COATED ORAL at 23:23

## 2022-02-23 RX ADMIN — SODIUM CHLORIDE 75 ML/HR: 9 INJECTION, SOLUTION INTRAVENOUS at 22:02

## 2022-02-24 ENCOUNTER — HOME HEALTH ADMISSION (OUTPATIENT)
Dept: HOME HEALTH SERVICES | Facility: HOME HEALTHCARE | Age: 57
End: 2022-02-24

## 2022-02-24 ENCOUNTER — APPOINTMENT (OUTPATIENT)
Dept: CT IMAGING | Facility: HOSPITAL | Age: 57
End: 2022-02-24

## 2022-02-24 PROBLEM — I50.32 CHRONIC DIASTOLIC CHF (CONGESTIVE HEART FAILURE): Status: ACTIVE | Noted: 2021-12-25

## 2022-02-24 PROBLEM — Z90.49 STATUS POST CHOLECYSTECTOMY: Status: ACTIVE | Noted: 2022-02-24

## 2022-02-24 PROBLEM — E87.1 HYPONATREMIA: Status: ACTIVE | Noted: 2022-02-24

## 2022-02-24 PROBLEM — D72.829 LEUKOCYTOSIS: Status: ACTIVE | Noted: 2022-02-24

## 2022-02-24 PROBLEM — R93.5 ABNORMAL FINDINGS ON DIAGNOSTIC IMAGING OF ABDOMEN: Status: ACTIVE | Noted: 2022-02-24

## 2022-02-24 PROBLEM — G93.41 METABOLIC ENCEPHALOPATHY: Status: ACTIVE | Noted: 2022-02-24

## 2022-02-24 LAB
ALBUMIN SERPL-MCNC: 2.5 G/DL (ref 3.5–5.2)
ANION GAP SERPL CALCULATED.3IONS-SCNC: 10 MMOL/L (ref 5–15)
ANION GAP SERPL CALCULATED.3IONS-SCNC: 12 MMOL/L (ref 5–15)
BUN SERPL-MCNC: 25 MG/DL (ref 6–20)
BUN SERPL-MCNC: 25 MG/DL (ref 6–20)
BUN/CREAT SERPL: 5.9 (ref 7–25)
BUN/CREAT SERPL: 6.9 (ref 7–25)
CALCIUM SPEC-SCNC: 7.7 MG/DL (ref 8.6–10.5)
CALCIUM SPEC-SCNC: 7.8 MG/DL (ref 8.6–10.5)
CHLORIDE SERPL-SCNC: 93 MMOL/L (ref 98–107)
CHLORIDE SERPL-SCNC: 95 MMOL/L (ref 98–107)
CO2 SERPL-SCNC: 25 MMOL/L (ref 22–29)
CO2 SERPL-SCNC: 30 MMOL/L (ref 22–29)
CREAT SERPL-MCNC: 3.6 MG/DL (ref 0.57–1)
CREAT SERPL-MCNC: 4.25 MG/DL (ref 0.57–1)
DEPRECATED RDW RBC AUTO: 54.3 FL (ref 37–54)
ERYTHROCYTE [DISTWIDTH] IN BLOOD BY AUTOMATED COUNT: 18.2 % (ref 12.3–15.4)
GFR SERPL CREATININE-BSD FRML MDRD: 11 ML/MIN/1.73
GFR SERPL CREATININE-BSD FRML MDRD: 13 ML/MIN/1.73
GLUCOSE BLDC GLUCOMTR-MCNC: 134 MG/DL (ref 70–130)
GLUCOSE BLDC GLUCOMTR-MCNC: 145 MG/DL (ref 70–130)
GLUCOSE BLDC GLUCOMTR-MCNC: 156 MG/DL (ref 70–130)
GLUCOSE BLDC GLUCOMTR-MCNC: 51 MG/DL (ref 70–130)
GLUCOSE BLDC GLUCOMTR-MCNC: 58 MG/DL (ref 70–130)
GLUCOSE BLDC GLUCOMTR-MCNC: 64 MG/DL (ref 70–130)
GLUCOSE BLDC GLUCOMTR-MCNC: 65 MG/DL (ref 70–130)
GLUCOSE BLDC GLUCOMTR-MCNC: 81 MG/DL (ref 70–130)
GLUCOSE SERPL-MCNC: 166 MG/DL (ref 65–99)
GLUCOSE SERPL-MCNC: 67 MG/DL (ref 65–99)
HBA1C MFR BLD: 5.8 % (ref 4.8–5.6)
HCT VFR BLD AUTO: 27.9 % (ref 34–46.6)
HGB BLD-MCNC: 9.7 G/DL (ref 12–15.9)
MCH RBC QN AUTO: 28.8 PG (ref 26.6–33)
MCHC RBC AUTO-ENTMCNC: 34.8 G/DL (ref 31.5–35.7)
MCV RBC AUTO: 82.8 FL (ref 79–97)
NT-PROBNP SERPL-MCNC: ABNORMAL PG/ML (ref 0–900)
PHOSPHATE SERPL-MCNC: 3.5 MG/DL (ref 2.5–4.5)
PLATELET # BLD AUTO: 246 10*3/MM3 (ref 140–450)
PMV BLD AUTO: 10.2 FL (ref 6–12)
POTASSIUM SERPL-SCNC: 3.7 MMOL/L (ref 3.5–5.2)
POTASSIUM SERPL-SCNC: 4 MMOL/L (ref 3.5–5.2)
RBC # BLD AUTO: 3.37 10*6/MM3 (ref 3.77–5.28)
SODIUM SERPL-SCNC: 132 MMOL/L (ref 136–145)
SODIUM SERPL-SCNC: 133 MMOL/L (ref 136–145)
URATE SERPL-MCNC: 6.2 MG/DL (ref 2.4–5.7)
WBC NRBC COR # BLD: 13.32 10*3/MM3 (ref 3.4–10.8)

## 2022-02-24 PROCEDURE — 25010000002 VANCOMYCIN 10 G RECONSTITUTED SOLUTION: Performed by: INTERNAL MEDICINE

## 2022-02-24 PROCEDURE — 80069 RENAL FUNCTION PANEL: CPT | Performed by: NURSE PRACTITIONER

## 2022-02-24 PROCEDURE — 82962 GLUCOSE BLOOD TEST: CPT

## 2022-02-24 PROCEDURE — 63710000001 INSULIN LISPRO (HUMAN) PER 5 UNITS: Performed by: INTERNAL MEDICINE

## 2022-02-24 PROCEDURE — 84550 ASSAY OF BLOOD/URIC ACID: CPT | Performed by: NURSE PRACTITIONER

## 2022-02-24 PROCEDURE — 80048 BASIC METABOLIC PNL TOTAL CA: CPT | Performed by: INTERNAL MEDICINE

## 2022-02-24 PROCEDURE — 83880 ASSAY OF NATRIURETIC PEPTIDE: CPT | Performed by: NURSE PRACTITIONER

## 2022-02-24 PROCEDURE — 85027 COMPLETE CBC AUTOMATED: CPT | Performed by: INTERNAL MEDICINE

## 2022-02-24 PROCEDURE — 25010000002 PIPERACILLIN SOD-TAZOBACTAM PER 1 G: Performed by: INTERNAL MEDICINE

## 2022-02-24 PROCEDURE — 36415 COLL VENOUS BLD VENIPUNCTURE: CPT | Performed by: INTERNAL MEDICINE

## 2022-02-24 PROCEDURE — 83036 HEMOGLOBIN GLYCOSYLATED A1C: CPT | Performed by: INTERNAL MEDICINE

## 2022-02-24 RX ORDER — INSULIN LISPRO 100 [IU]/ML
0-7 INJECTION, SOLUTION INTRAVENOUS; SUBCUTANEOUS
Status: DISCONTINUED | OUTPATIENT
Start: 2022-02-24 | End: 2022-03-02 | Stop reason: HOSPADM

## 2022-02-24 RX ADMIN — FOLIC ACID 1 MG: 1 TABLET ORAL at 08:25

## 2022-02-24 RX ADMIN — SODIUM CHLORIDE 75 ML/HR: 9 INJECTION, SOLUTION INTRAVENOUS at 08:21

## 2022-02-24 RX ADMIN — INSULIN GLARGINE-YFGN 10 UNITS: 100 INJECTION, SOLUTION SUBCUTANEOUS at 08:14

## 2022-02-24 RX ADMIN — FUROSEMIDE 40 MG: 40 TABLET ORAL at 08:25

## 2022-02-24 RX ADMIN — METOPROLOL TARTRATE 25 MG: 25 TABLET, FILM COATED ORAL at 20:34

## 2022-02-24 RX ADMIN — HYDRALAZINE HYDROCHLORIDE 25 MG: 25 TABLET, FILM COATED ORAL at 16:01

## 2022-02-24 RX ADMIN — OLANZAPINE 5 MG: 5 TABLET ORAL at 20:35

## 2022-02-24 RX ADMIN — AMLODIPINE BESYLATE 10 MG: 10 TABLET ORAL at 08:25

## 2022-02-24 RX ADMIN — HYDROCODONE BITARTRATE AND ACETAMINOPHEN 1 TABLET: 5; 325 TABLET ORAL at 20:38

## 2022-02-24 RX ADMIN — Medication 5000 UNITS: at 08:24

## 2022-02-24 RX ADMIN — TAMSULOSIN HYDROCHLORIDE 0.4 MG: 0.4 CAPSULE ORAL at 08:24

## 2022-02-24 RX ADMIN — INSULIN LISPRO 2 UNITS: 100 INJECTION, SOLUTION INTRAVENOUS; SUBCUTANEOUS at 08:13

## 2022-02-24 RX ADMIN — FUROSEMIDE 40 MG: 40 TABLET ORAL at 20:35

## 2022-02-24 RX ADMIN — TAZOBACTAM SODIUM AND PIPERACILLIN SODIUM 3.38 G: 375; 3 INJECTION, SOLUTION INTRAVENOUS at 11:27

## 2022-02-24 RX ADMIN — VANCOMYCIN HYDROCHLORIDE 1250 MG: 10 INJECTION, POWDER, LYOPHILIZED, FOR SOLUTION INTRAVENOUS at 02:18

## 2022-02-24 RX ADMIN — ROPINIROLE HYDROCHLORIDE 0.75 MG: 0.5 TABLET, FILM COATED ORAL at 20:34

## 2022-02-24 RX ADMIN — SERTRALINE 100 MG: 100 TABLET, FILM COATED ORAL at 08:25

## 2022-02-24 RX ADMIN — LEVOTHYROXINE SODIUM 125 MCG: 0.12 TABLET ORAL at 06:37

## 2022-02-24 RX ADMIN — OLANZAPINE 5 MG: 5 TABLET ORAL at 08:25

## 2022-02-24 RX ADMIN — TAZOBACTAM SODIUM AND PIPERACILLIN SODIUM 3.38 G: 375; 3 INJECTION, SOLUTION INTRAVENOUS at 22:13

## 2022-02-24 RX ADMIN — INSULIN LISPRO 3 UNITS: 100 INJECTION, SOLUTION INTRAVENOUS; SUBCUTANEOUS at 08:21

## 2022-02-24 RX ADMIN — ASPIRIN 81 MG: 81 TABLET, COATED ORAL at 08:24

## 2022-02-24 RX ADMIN — HYDRALAZINE HYDROCHLORIDE 25 MG: 25 TABLET, FILM COATED ORAL at 20:35

## 2022-02-24 RX ADMIN — Medication 3 MG: at 20:38

## 2022-02-24 RX ADMIN — PANTOPRAZOLE SODIUM 40 MG: 40 TABLET, DELAYED RELEASE ORAL at 06:37

## 2022-02-24 RX ADMIN — HYDRALAZINE HYDROCHLORIDE 25 MG: 25 TABLET, FILM COATED ORAL at 08:25

## 2022-02-25 ENCOUNTER — APPOINTMENT (OUTPATIENT)
Dept: CT IMAGING | Facility: HOSPITAL | Age: 57
End: 2022-02-25

## 2022-02-25 LAB
ALBUMIN SERPL-MCNC: 2.2 G/DL (ref 3.5–5.2)
ANION GAP SERPL CALCULATED.3IONS-SCNC: 11.9 MMOL/L (ref 5–15)
BUN SERPL-MCNC: 34 MG/DL (ref 6–20)
BUN/CREAT SERPL: 7.6 (ref 7–25)
CALCIUM SPEC-SCNC: 7.6 MG/DL (ref 8.6–10.5)
CHLORIDE SERPL-SCNC: 97 MMOL/L (ref 98–107)
CO2 SERPL-SCNC: 26.1 MMOL/L (ref 22–29)
CREAT SERPL-MCNC: 4.45 MG/DL (ref 0.57–1)
DEPRECATED RDW RBC AUTO: 55.6 FL (ref 37–54)
ERYTHROCYTE [DISTWIDTH] IN BLOOD BY AUTOMATED COUNT: 18 % (ref 12.3–15.4)
GFR SERPL CREATININE-BSD FRML MDRD: 10 ML/MIN/1.73
GLUCOSE BLDC GLUCOMTR-MCNC: 104 MG/DL (ref 70–130)
GLUCOSE BLDC GLUCOMTR-MCNC: 166 MG/DL (ref 70–130)
GLUCOSE BLDC GLUCOMTR-MCNC: 58 MG/DL (ref 70–130)
GLUCOSE BLDC GLUCOMTR-MCNC: 68 MG/DL (ref 70–130)
GLUCOSE BLDC GLUCOMTR-MCNC: 72 MG/DL (ref 70–130)
GLUCOSE SERPL-MCNC: 55 MG/DL (ref 65–99)
HBV SURFACE AG SERPL QL IA: NORMAL
HCT VFR BLD AUTO: 29.8 % (ref 34–46.6)
HGB BLD-MCNC: 9.6 G/DL (ref 12–15.9)
MCH RBC QN AUTO: 27.7 PG (ref 26.6–33)
MCHC RBC AUTO-ENTMCNC: 32.2 G/DL (ref 31.5–35.7)
MCV RBC AUTO: 85.9 FL (ref 79–97)
PHOSPHATE SERPL-MCNC: 4.6 MG/DL (ref 2.5–4.5)
PLATELET # BLD AUTO: 254 10*3/MM3 (ref 140–450)
PMV BLD AUTO: 9.9 FL (ref 6–12)
POTASSIUM SERPL-SCNC: 4.4 MMOL/L (ref 3.5–5.2)
RBC # BLD AUTO: 3.47 10*6/MM3 (ref 3.77–5.28)
SODIUM SERPL-SCNC: 135 MMOL/L (ref 136–145)
VANCOMYCIN SERPL-MCNC: 13.7 MCG/ML (ref 5–40)
WBC NRBC COR # BLD: 13.08 10*3/MM3 (ref 3.4–10.8)

## 2022-02-25 PROCEDURE — 10160 PNXR ASPIR ABSC HMTMA BULLA: CPT

## 2022-02-25 PROCEDURE — 99152 MOD SED SAME PHYS/QHP 5/>YRS: CPT

## 2022-02-25 PROCEDURE — 25010000002 HEPARIN (PORCINE) PER 1000 UNITS: Performed by: INTERNAL MEDICINE

## 2022-02-25 PROCEDURE — 99024 POSTOP FOLLOW-UP VISIT: CPT | Performed by: SURGERY

## 2022-02-25 PROCEDURE — 25010000002 MIDAZOLAM PER 1 MG: Performed by: RADIOLOGY

## 2022-02-25 PROCEDURE — 80202 ASSAY OF VANCOMYCIN: CPT | Performed by: INTERNAL MEDICINE

## 2022-02-25 PROCEDURE — 25010000002 PIPERACILLIN SOD-TAZOBACTAM PER 1 G: Performed by: INTERNAL MEDICINE

## 2022-02-25 PROCEDURE — 25010000002 FENTANYL CITRATE (PF) 50 MCG/ML SOLUTION: Performed by: RADIOLOGY

## 2022-02-25 PROCEDURE — 25010000002 VANCOMYCIN PER 500 MG: Performed by: HOSPITALIST

## 2022-02-25 PROCEDURE — 5A1D70Z PERFORMANCE OF URINARY FILTRATION, INTERMITTENT, LESS THAN 6 HOURS PER DAY: ICD-10-PCS | Performed by: INTERNAL MEDICINE

## 2022-02-25 PROCEDURE — 87070 CULTURE OTHR SPECIMN AEROBIC: CPT | Performed by: SURGERY

## 2022-02-25 PROCEDURE — 87205 SMEAR GRAM STAIN: CPT | Performed by: SURGERY

## 2022-02-25 PROCEDURE — 0 LIDOCAINE 1 % SOLUTION: Performed by: RADIOLOGY

## 2022-02-25 PROCEDURE — 77012 CT SCAN FOR NEEDLE BIOPSY: CPT

## 2022-02-25 PROCEDURE — 87341 HEP B SURFACE AG NEUTRLZJ IA: CPT | Performed by: INTERNAL MEDICINE

## 2022-02-25 PROCEDURE — 85027 COMPLETE CBC AUTOMATED: CPT | Performed by: NURSE PRACTITIONER

## 2022-02-25 PROCEDURE — 80069 RENAL FUNCTION PANEL: CPT | Performed by: HOSPITALIST

## 2022-02-25 PROCEDURE — 87340 HEPATITIS B SURFACE AG IA: CPT | Performed by: INTERNAL MEDICINE

## 2022-02-25 PROCEDURE — 0W9G3ZX DRAINAGE OF PERITONEAL CAVITY, PERCUTANEOUS APPROACH, DIAGNOSTIC: ICD-10-PCS | Performed by: RADIOLOGY

## 2022-02-25 PROCEDURE — 87075 CULTR BACTERIA EXCEPT BLOOD: CPT | Performed by: SURGERY

## 2022-02-25 PROCEDURE — 82962 GLUCOSE BLOOD TEST: CPT

## 2022-02-25 RX ORDER — SODIUM CHLORIDE 9 MG/ML
INJECTION, SOLUTION INTRAVENOUS
Status: COMPLETED | OUTPATIENT
Start: 2022-02-25 | End: 2022-02-25

## 2022-02-25 RX ORDER — LIDOCAINE HYDROCHLORIDE 10 MG/ML
20 INJECTION, SOLUTION INFILTRATION; PERINEURAL ONCE
Status: COMPLETED | OUTPATIENT
Start: 2022-02-25 | End: 2022-02-25

## 2022-02-25 RX ORDER — FENTANYL CITRATE 50 UG/ML
INJECTION, SOLUTION INTRAMUSCULAR; INTRAVENOUS
Status: COMPLETED | OUTPATIENT
Start: 2022-02-25 | End: 2022-02-25

## 2022-02-25 RX ORDER — MIDAZOLAM HYDROCHLORIDE 1 MG/ML
INJECTION INTRAMUSCULAR; INTRAVENOUS
Status: COMPLETED | OUTPATIENT
Start: 2022-02-25 | End: 2022-02-25

## 2022-02-25 RX ORDER — HYDROCODONE BITARTRATE AND ACETAMINOPHEN 5; 325 MG/1; MG/1
1 TABLET ORAL EVERY 8 HOURS PRN
Status: DISCONTINUED | OUTPATIENT
Start: 2022-02-25 | End: 2022-03-02 | Stop reason: HOSPADM

## 2022-02-25 RX ORDER — HEPARIN SODIUM 1000 [USP'U]/ML
4000 INJECTION, SOLUTION INTRAVENOUS; SUBCUTANEOUS AS NEEDED
Status: DISCONTINUED | OUTPATIENT
Start: 2022-02-25 | End: 2022-03-02 | Stop reason: HOSPADM

## 2022-02-25 RX ADMIN — HYDRALAZINE HYDROCHLORIDE 75 MG: 50 TABLET, FILM COATED ORAL at 18:32

## 2022-02-25 RX ADMIN — Medication 5000 UNITS: at 18:31

## 2022-02-25 RX ADMIN — METOPROLOL TARTRATE 25 MG: 25 TABLET, FILM COATED ORAL at 20:26

## 2022-02-25 RX ADMIN — TAZOBACTAM SODIUM AND PIPERACILLIN SODIUM 3.38 G: 375; 3 INJECTION, SOLUTION INTRAVENOUS at 22:37

## 2022-02-25 RX ADMIN — DEXTROSE MONOHYDRATE 25 G: 25 INJECTION, SOLUTION INTRAVENOUS at 06:23

## 2022-02-25 RX ADMIN — MIDAZOLAM 1 MG: 1 INJECTION INTRAMUSCULAR; INTRAVENOUS at 11:19

## 2022-02-25 RX ADMIN — FOLIC ACID 1 MG: 1 TABLET ORAL at 18:35

## 2022-02-25 RX ADMIN — OLANZAPINE 5 MG: 5 TABLET ORAL at 20:26

## 2022-02-25 RX ADMIN — FUROSEMIDE 40 MG: 40 TABLET ORAL at 20:26

## 2022-02-25 RX ADMIN — ASPIRIN 81 MG: 81 TABLET, COATED ORAL at 18:31

## 2022-02-25 RX ADMIN — VANCOMYCIN HYDROCHLORIDE 500 MG: 500 INJECTION, POWDER, LYOPHILIZED, FOR SOLUTION INTRAVENOUS at 18:37

## 2022-02-25 RX ADMIN — LIDOCAINE HYDROCHLORIDE 20 ML: 10 INJECTION, SOLUTION INFILTRATION; PERINEURAL at 11:20

## 2022-02-25 RX ADMIN — TAMSULOSIN HYDROCHLORIDE 0.4 MG: 0.4 CAPSULE ORAL at 18:35

## 2022-02-25 RX ADMIN — AMLODIPINE BESYLATE 10 MG: 10 TABLET ORAL at 18:35

## 2022-02-25 RX ADMIN — HYDRALAZINE HYDROCHLORIDE 75 MG: 50 TABLET, FILM COATED ORAL at 22:37

## 2022-02-25 RX ADMIN — SERTRALINE 100 MG: 100 TABLET, FILM COATED ORAL at 18:34

## 2022-02-25 RX ADMIN — ROPINIROLE HYDROCHLORIDE 0.75 MG: 0.5 TABLET, FILM COATED ORAL at 20:26

## 2022-02-25 RX ADMIN — FENTANYL CITRATE 50 MCG: 0.05 INJECTION, SOLUTION INTRAMUSCULAR; INTRAVENOUS at 11:20

## 2022-02-25 RX ADMIN — HEPARIN SODIUM 4000 UNITS: 1000 INJECTION INTRAVENOUS; SUBCUTANEOUS at 16:59

## 2022-02-25 RX ADMIN — SODIUM CHLORIDE 25 ML/HR: 9 INJECTION, SOLUTION INTRAVENOUS at 11:06

## 2022-02-26 LAB
ANION GAP SERPL CALCULATED.3IONS-SCNC: 9 MMOL/L (ref 5–15)
BUN SERPL-MCNC: 19 MG/DL (ref 6–20)
BUN/CREAT SERPL: 6.1 (ref 7–25)
CALCIUM SPEC-SCNC: 7.5 MG/DL (ref 8.6–10.5)
CHLORIDE SERPL-SCNC: 98 MMOL/L (ref 98–107)
CO2 SERPL-SCNC: 25 MMOL/L (ref 22–29)
CREAT SERPL-MCNC: 3.13 MG/DL (ref 0.57–1)
DEPRECATED RDW RBC AUTO: 54.4 FL (ref 37–54)
ERYTHROCYTE [DISTWIDTH] IN BLOOD BY AUTOMATED COUNT: 17.5 % (ref 12.3–15.4)
GFR SERPL CREATININE-BSD FRML MDRD: 15 ML/MIN/1.73
GLUCOSE BLDC GLUCOMTR-MCNC: 114 MG/DL (ref 70–130)
GLUCOSE BLDC GLUCOMTR-MCNC: 179 MG/DL (ref 70–130)
GLUCOSE BLDC GLUCOMTR-MCNC: 252 MG/DL (ref 70–130)
GLUCOSE BLDC GLUCOMTR-MCNC: 283 MG/DL (ref 70–130)
GLUCOSE SERPL-MCNC: 237 MG/DL (ref 65–99)
HBV SURFACE AG SERPL QL IA: NORMAL
HBV SURFACE AG SERPL QL NT: POSITIVE
HCT VFR BLD AUTO: 28.7 % (ref 34–46.6)
HGB BLD-MCNC: 9.3 G/DL (ref 12–15.9)
MCH RBC QN AUTO: 28.2 PG (ref 26.6–33)
MCHC RBC AUTO-ENTMCNC: 32.4 G/DL (ref 31.5–35.7)
MCV RBC AUTO: 87 FL (ref 79–97)
PLATELET # BLD AUTO: 240 10*3/MM3 (ref 140–450)
PMV BLD AUTO: 10.2 FL (ref 6–12)
POTASSIUM SERPL-SCNC: 4.2 MMOL/L (ref 3.5–5.2)
PROCALCITONIN SERPL-MCNC: 0.47 NG/ML (ref 0–0.25)
RBC # BLD AUTO: 3.3 10*6/MM3 (ref 3.77–5.28)
SODIUM SERPL-SCNC: 132 MMOL/L (ref 136–145)
VANCOMYCIN SERPL-MCNC: 17 MCG/ML (ref 5–40)
WBC NRBC COR # BLD: 11.39 10*3/MM3 (ref 3.4–10.8)

## 2022-02-26 PROCEDURE — 85027 COMPLETE CBC AUTOMATED: CPT | Performed by: HOSPITALIST

## 2022-02-26 PROCEDURE — 25010000002 PIPERACILLIN SOD-TAZOBACTAM PER 1 G: Performed by: INTERNAL MEDICINE

## 2022-02-26 PROCEDURE — 25010000002 VANCOMYCIN PER 500 MG: Performed by: INTERNAL MEDICINE

## 2022-02-26 PROCEDURE — 80202 ASSAY OF VANCOMYCIN: CPT | Performed by: HOSPITALIST

## 2022-02-26 PROCEDURE — 84145 PROCALCITONIN (PCT): CPT | Performed by: HOSPITALIST

## 2022-02-26 PROCEDURE — 82962 GLUCOSE BLOOD TEST: CPT

## 2022-02-26 PROCEDURE — 63710000001 INSULIN LISPRO (HUMAN) PER 5 UNITS: Performed by: HOSPITALIST

## 2022-02-26 PROCEDURE — 80048 BASIC METABOLIC PNL TOTAL CA: CPT | Performed by: HOSPITALIST

## 2022-02-26 RX ORDER — HYDRALAZINE HYDROCHLORIDE 50 MG/1
100 TABLET, FILM COATED ORAL 3 TIMES DAILY
Status: DISCONTINUED | OUTPATIENT
Start: 2022-02-26 | End: 2022-03-02 | Stop reason: HOSPADM

## 2022-02-26 RX ADMIN — FOLIC ACID 1 MG: 1 TABLET ORAL at 10:45

## 2022-02-26 RX ADMIN — METOPROLOL TARTRATE 25 MG: 25 TABLET, FILM COATED ORAL at 20:32

## 2022-02-26 RX ADMIN — HYDROCODONE BITARTRATE AND ACETAMINOPHEN 1 TABLET: 5; 325 TABLET ORAL at 04:49

## 2022-02-26 RX ADMIN — ROPINIROLE HYDROCHLORIDE 0.75 MG: 0.5 TABLET, FILM COATED ORAL at 20:32

## 2022-02-26 RX ADMIN — TAZOBACTAM SODIUM AND PIPERACILLIN SODIUM 3.38 G: 375; 3 INJECTION, SOLUTION INTRAVENOUS at 10:43

## 2022-02-26 RX ADMIN — PANTOPRAZOLE SODIUM 40 MG: 40 TABLET, DELAYED RELEASE ORAL at 04:51

## 2022-02-26 RX ADMIN — HYDROCODONE BITARTRATE AND ACETAMINOPHEN 1 TABLET: 5; 325 TABLET ORAL at 19:12

## 2022-02-26 RX ADMIN — TAZOBACTAM SODIUM AND PIPERACILLIN SODIUM 3.38 G: 375; 3 INJECTION, SOLUTION INTRAVENOUS at 23:11

## 2022-02-26 RX ADMIN — AMLODIPINE BESYLATE 10 MG: 10 TABLET ORAL at 10:45

## 2022-02-26 RX ADMIN — LEVOTHYROXINE SODIUM 125 MCG: 0.12 TABLET ORAL at 04:51

## 2022-02-26 RX ADMIN — Medication 5000 UNITS: at 10:45

## 2022-02-26 RX ADMIN — INSULIN LISPRO 4 UNITS: 100 INJECTION, SOLUTION INTRAVENOUS; SUBCUTANEOUS at 08:48

## 2022-02-26 RX ADMIN — TAMSULOSIN HYDROCHLORIDE 0.4 MG: 0.4 CAPSULE ORAL at 10:45

## 2022-02-26 RX ADMIN — ASPIRIN 81 MG: 81 TABLET, COATED ORAL at 10:44

## 2022-02-26 RX ADMIN — OLANZAPINE 5 MG: 5 TABLET ORAL at 10:45

## 2022-02-26 RX ADMIN — HYDRALAZINE HYDROCHLORIDE 100 MG: 50 TABLET, FILM COATED ORAL at 10:43

## 2022-02-26 RX ADMIN — VANCOMYCIN HYDROCHLORIDE 500 MG: 500 INJECTION, POWDER, LYOPHILIZED, FOR SOLUTION INTRAVENOUS at 16:21

## 2022-02-26 RX ADMIN — SERTRALINE 100 MG: 100 TABLET, FILM COATED ORAL at 10:45

## 2022-02-26 RX ADMIN — OLANZAPINE 5 MG: 5 TABLET ORAL at 20:32

## 2022-02-26 RX ADMIN — FUROSEMIDE 40 MG: 40 TABLET ORAL at 10:44

## 2022-02-26 RX ADMIN — INSULIN LISPRO 2 UNITS: 100 INJECTION, SOLUTION INTRAVENOUS; SUBCUTANEOUS at 17:05

## 2022-02-27 LAB
ALBUMIN SERPL-MCNC: 2.7 G/DL (ref 3.5–5.2)
ANION GAP SERPL CALCULATED.3IONS-SCNC: 9 MMOL/L (ref 5–15)
BASOPHILS # BLD AUTO: 0.07 10*3/MM3 (ref 0–0.2)
BASOPHILS NFR BLD AUTO: 0.7 % (ref 0–1.5)
BUN SERPL-MCNC: 11 MG/DL (ref 6–20)
BUN/CREAT SERPL: 5.7 (ref 7–25)
CALCIUM SPEC-SCNC: 7.8 MG/DL (ref 8.6–10.5)
CHLORIDE SERPL-SCNC: 99 MMOL/L (ref 98–107)
CO2 SERPL-SCNC: 22 MMOL/L (ref 22–29)
CREAT SERPL-MCNC: 1.94 MG/DL (ref 0.57–1)
DEPRECATED RDW RBC AUTO: 54.2 FL (ref 37–54)
EOSINOPHIL # BLD AUTO: 0.4 10*3/MM3 (ref 0–0.4)
EOSINOPHIL NFR BLD AUTO: 3.9 % (ref 0.3–6.2)
ERYTHROCYTE [DISTWIDTH] IN BLOOD BY AUTOMATED COUNT: 17.6 % (ref 12.3–15.4)
GFR SERPL CREATININE-BSD FRML MDRD: 27 ML/MIN/1.73
GLUCOSE BLDC GLUCOMTR-MCNC: 119 MG/DL (ref 70–130)
GLUCOSE BLDC GLUCOMTR-MCNC: 244 MG/DL (ref 70–130)
GLUCOSE BLDC GLUCOMTR-MCNC: 244 MG/DL (ref 70–130)
GLUCOSE BLDC GLUCOMTR-MCNC: 248 MG/DL (ref 70–130)
GLUCOSE SERPL-MCNC: 199 MG/DL (ref 65–99)
HCT VFR BLD AUTO: 30.9 % (ref 34–46.6)
HGB BLD-MCNC: 9.9 G/DL (ref 12–15.9)
IMM GRANULOCYTES # BLD AUTO: 0.12 10*3/MM3 (ref 0–0.05)
IMM GRANULOCYTES NFR BLD AUTO: 1.2 % (ref 0–0.5)
LYMPHOCYTES # BLD AUTO: 1.66 10*3/MM3 (ref 0.7–3.1)
LYMPHOCYTES NFR BLD AUTO: 16.4 % (ref 19.6–45.3)
MCH RBC QN AUTO: 27.5 PG (ref 26.6–33)
MCHC RBC AUTO-ENTMCNC: 32 G/DL (ref 31.5–35.7)
MCV RBC AUTO: 85.8 FL (ref 79–97)
MONOCYTES # BLD AUTO: 1.16 10*3/MM3 (ref 0.1–0.9)
MONOCYTES NFR BLD AUTO: 11.5 % (ref 5–12)
NEUTROPHILS NFR BLD AUTO: 6.72 10*3/MM3 (ref 1.7–7)
NEUTROPHILS NFR BLD AUTO: 66.3 % (ref 42.7–76)
NRBC BLD AUTO-RTO: 0 /100 WBC (ref 0–0.2)
PHOSPHATE SERPL-MCNC: 2.4 MG/DL (ref 2.5–4.5)
PLATELET # BLD AUTO: 247 10*3/MM3 (ref 140–450)
PMV BLD AUTO: 10.1 FL (ref 6–12)
POTASSIUM SERPL-SCNC: 4 MMOL/L (ref 3.5–5.2)
RBC # BLD AUTO: 3.6 10*6/MM3 (ref 3.77–5.28)
SODIUM SERPL-SCNC: 130 MMOL/L (ref 136–145)
WBC NRBC COR # BLD: 10.13 10*3/MM3 (ref 3.4–10.8)

## 2022-02-27 PROCEDURE — 63710000001 INSULIN LISPRO (HUMAN) PER 5 UNITS: Performed by: HOSPITALIST

## 2022-02-27 PROCEDURE — 25010000002 PIPERACILLIN SOD-TAZOBACTAM PER 1 G: Performed by: INTERNAL MEDICINE

## 2022-02-27 PROCEDURE — 82962 GLUCOSE BLOOD TEST: CPT

## 2022-02-27 PROCEDURE — 80069 RENAL FUNCTION PANEL: CPT | Performed by: INTERNAL MEDICINE

## 2022-02-27 PROCEDURE — 85025 COMPLETE CBC W/AUTO DIFF WBC: CPT | Performed by: INTERNAL MEDICINE

## 2022-02-27 PROCEDURE — 25010000002 HEPARIN (PORCINE) PER 1000 UNITS: Performed by: INTERNAL MEDICINE

## 2022-02-27 RX ADMIN — METOPROLOL TARTRATE 25 MG: 25 TABLET, FILM COATED ORAL at 08:40

## 2022-02-27 RX ADMIN — Medication 5000 UNITS: at 08:39

## 2022-02-27 RX ADMIN — HYDROCODONE BITARTRATE AND ACETAMINOPHEN 1 TABLET: 5; 325 TABLET ORAL at 08:39

## 2022-02-27 RX ADMIN — ROPINIROLE HYDROCHLORIDE 0.75 MG: 0.5 TABLET, FILM COATED ORAL at 17:34

## 2022-02-27 RX ADMIN — TAZOBACTAM SODIUM AND PIPERACILLIN SODIUM 3.38 G: 375; 3 INJECTION, SOLUTION INTRAVENOUS at 12:16

## 2022-02-27 RX ADMIN — AMLODIPINE BESYLATE 10 MG: 10 TABLET ORAL at 08:40

## 2022-02-27 RX ADMIN — INSULIN LISPRO 3 UNITS: 100 INJECTION, SOLUTION INTRAVENOUS; SUBCUTANEOUS at 08:39

## 2022-02-27 RX ADMIN — HEPARIN SODIUM 4000 UNITS: 1000 INJECTION INTRAVENOUS; SUBCUTANEOUS at 02:54

## 2022-02-27 RX ADMIN — ASPIRIN 81 MG: 81 TABLET, COATED ORAL at 08:40

## 2022-02-27 RX ADMIN — METOPROLOL TARTRATE 25 MG: 25 TABLET, FILM COATED ORAL at 21:35

## 2022-02-27 RX ADMIN — OLANZAPINE 5 MG: 5 TABLET ORAL at 21:35

## 2022-02-27 RX ADMIN — OLANZAPINE 5 MG: 5 TABLET ORAL at 08:40

## 2022-02-27 RX ADMIN — FUROSEMIDE 40 MG: 40 TABLET ORAL at 08:40

## 2022-02-27 RX ADMIN — HYDRALAZINE HYDROCHLORIDE 100 MG: 50 TABLET, FILM COATED ORAL at 21:35

## 2022-02-27 RX ADMIN — HYDRALAZINE HYDROCHLORIDE 100 MG: 50 TABLET, FILM COATED ORAL at 16:50

## 2022-02-27 RX ADMIN — FOLIC ACID 1 MG: 1 TABLET ORAL at 08:40

## 2022-02-27 RX ADMIN — PANTOPRAZOLE SODIUM 40 MG: 40 TABLET, DELAYED RELEASE ORAL at 06:06

## 2022-02-27 RX ADMIN — TAZOBACTAM SODIUM AND PIPERACILLIN SODIUM 3.38 G: 375; 3 INJECTION, SOLUTION INTRAVENOUS at 23:38

## 2022-02-27 RX ADMIN — FUROSEMIDE 40 MG: 40 TABLET ORAL at 21:35

## 2022-02-27 RX ADMIN — HYDRALAZINE HYDROCHLORIDE 100 MG: 50 TABLET, FILM COATED ORAL at 06:06

## 2022-02-27 RX ADMIN — LEVOTHYROXINE SODIUM 125 MCG: 0.12 TABLET ORAL at 06:06

## 2022-02-27 RX ADMIN — INSULIN LISPRO 3 UNITS: 100 INJECTION, SOLUTION INTRAVENOUS; SUBCUTANEOUS at 12:16

## 2022-02-27 RX ADMIN — SERTRALINE 100 MG: 100 TABLET, FILM COATED ORAL at 08:40

## 2022-02-27 RX ADMIN — TAMSULOSIN HYDROCHLORIDE 0.4 MG: 0.4 CAPSULE ORAL at 08:40

## 2022-02-28 LAB
ALBUMIN SERPL-MCNC: 2.4 G/DL (ref 3.5–5.2)
ANION GAP SERPL CALCULATED.3IONS-SCNC: 13.3 MMOL/L (ref 5–15)
BACTERIA FLD CULT: NORMAL
BACTERIA SPEC AEROBE CULT: NORMAL
BACTERIA SPEC AEROBE CULT: NORMAL
BASOPHILS # BLD AUTO: 0.09 10*3/MM3 (ref 0–0.2)
BASOPHILS NFR BLD AUTO: 0.8 % (ref 0–1.5)
BUN SERPL-MCNC: 25 MG/DL (ref 6–20)
BUN/CREAT SERPL: 7.4 (ref 7–25)
CALCIUM SPEC-SCNC: 8.1 MG/DL (ref 8.6–10.5)
CHLORIDE SERPL-SCNC: 99 MMOL/L (ref 98–107)
CO2 SERPL-SCNC: 20.7 MMOL/L (ref 22–29)
CREAT SERPL-MCNC: 3.39 MG/DL (ref 0.57–1)
DEPRECATED RDW RBC AUTO: 54 FL (ref 37–54)
EOSINOPHIL # BLD AUTO: 0.49 10*3/MM3 (ref 0–0.4)
EOSINOPHIL NFR BLD AUTO: 4.2 % (ref 0.3–6.2)
ERYTHROCYTE [DISTWIDTH] IN BLOOD BY AUTOMATED COUNT: 18 % (ref 12.3–15.4)
GFR SERPL CREATININE-BSD FRML MDRD: 14 ML/MIN/1.73
GLUCOSE BLDC GLUCOMTR-MCNC: 164 MG/DL (ref 70–130)
GLUCOSE BLDC GLUCOMTR-MCNC: 219 MG/DL (ref 70–130)
GLUCOSE BLDC GLUCOMTR-MCNC: 256 MG/DL (ref 70–130)
GLUCOSE BLDC GLUCOMTR-MCNC: 278 MG/DL (ref 70–130)
GLUCOSE SERPL-MCNC: 211 MG/DL (ref 65–99)
GRAM STN SPEC: NORMAL
GRAM STN SPEC: NORMAL
HCT VFR BLD AUTO: 32.1 % (ref 34–46.6)
HGB BLD-MCNC: 10.3 G/DL (ref 12–15.9)
IMM GRANULOCYTES # BLD AUTO: 0.13 10*3/MM3 (ref 0–0.05)
IMM GRANULOCYTES NFR BLD AUTO: 1.1 % (ref 0–0.5)
LYMPHOCYTES # BLD AUTO: 2.21 10*3/MM3 (ref 0.7–3.1)
LYMPHOCYTES NFR BLD AUTO: 19.1 % (ref 19.6–45.3)
MCH RBC QN AUTO: 27.2 PG (ref 26.6–33)
MCHC RBC AUTO-ENTMCNC: 32.1 G/DL (ref 31.5–35.7)
MCV RBC AUTO: 84.7 FL (ref 79–97)
MONOCYTES # BLD AUTO: 0.98 10*3/MM3 (ref 0.1–0.9)
MONOCYTES NFR BLD AUTO: 8.5 % (ref 5–12)
NEUTROPHILS NFR BLD AUTO: 66.3 % (ref 42.7–76)
NEUTROPHILS NFR BLD AUTO: 7.65 10*3/MM3 (ref 1.7–7)
NRBC BLD AUTO-RTO: 0 /100 WBC (ref 0–0.2)
PHOSPHATE SERPL-MCNC: 3.9 MG/DL (ref 2.5–4.5)
PLATELET # BLD AUTO: 289 10*3/MM3 (ref 140–450)
PMV BLD AUTO: 10.7 FL (ref 6–12)
POTASSIUM SERPL-SCNC: 4.5 MMOL/L (ref 3.5–5.2)
RBC # BLD AUTO: 3.79 10*6/MM3 (ref 3.77–5.28)
SODIUM SERPL-SCNC: 133 MMOL/L (ref 136–145)
VANCOMYCIN SERPL-MCNC: 14.7 MCG/ML (ref 5–40)
WBC NRBC COR # BLD: 11.55 10*3/MM3 (ref 3.4–10.8)

## 2022-02-28 PROCEDURE — 25010000002 VANCOMYCIN PER 500 MG: Performed by: INTERNAL MEDICINE

## 2022-02-28 PROCEDURE — 63710000001 INSULIN LISPRO (HUMAN) PER 5 UNITS: Performed by: HOSPITALIST

## 2022-02-28 PROCEDURE — 82962 GLUCOSE BLOOD TEST: CPT

## 2022-02-28 PROCEDURE — 97162 PT EVAL MOD COMPLEX 30 MIN: CPT

## 2022-02-28 PROCEDURE — 97530 THERAPEUTIC ACTIVITIES: CPT

## 2022-02-28 PROCEDURE — 25010000002 PIPERACILLIN SOD-TAZOBACTAM PER 1 G: Performed by: INTERNAL MEDICINE

## 2022-02-28 PROCEDURE — 99024 POSTOP FOLLOW-UP VISIT: CPT | Performed by: SURGERY

## 2022-02-28 PROCEDURE — 80074 ACUTE HEPATITIS PANEL: CPT | Performed by: INTERNAL MEDICINE

## 2022-02-28 PROCEDURE — 80202 ASSAY OF VANCOMYCIN: CPT | Performed by: INTERNAL MEDICINE

## 2022-02-28 PROCEDURE — 85025 COMPLETE CBC W/AUTO DIFF WBC: CPT | Performed by: INTERNAL MEDICINE

## 2022-02-28 PROCEDURE — 80069 RENAL FUNCTION PANEL: CPT | Performed by: INTERNAL MEDICINE

## 2022-02-28 RX ORDER — BISACODYL 5 MG/1
10 TABLET, DELAYED RELEASE ORAL DAILY PRN
Status: DISCONTINUED | OUTPATIENT
Start: 2022-02-28 | End: 2022-03-02 | Stop reason: HOSPADM

## 2022-02-28 RX ORDER — LOSARTAN POTASSIUM 50 MG/1
50 TABLET ORAL
Status: DISCONTINUED | OUTPATIENT
Start: 2022-02-28 | End: 2022-03-02 | Stop reason: HOSPADM

## 2022-02-28 RX ADMIN — METOPROLOL TARTRATE 25 MG: 25 TABLET, FILM COATED ORAL at 21:32

## 2022-02-28 RX ADMIN — AMLODIPINE BESYLATE 10 MG: 10 TABLET ORAL at 08:41

## 2022-02-28 RX ADMIN — INSULIN LISPRO 4 UNITS: 100 INJECTION, SOLUTION INTRAVENOUS; SUBCUTANEOUS at 17:21

## 2022-02-28 RX ADMIN — FOLIC ACID 1 MG: 1 TABLET ORAL at 08:41

## 2022-02-28 RX ADMIN — METOPROLOL TARTRATE 25 MG: 25 TABLET, FILM COATED ORAL at 08:41

## 2022-02-28 RX ADMIN — HYDRALAZINE HYDROCHLORIDE 100 MG: 50 TABLET, FILM COATED ORAL at 08:41

## 2022-02-28 RX ADMIN — SERTRALINE 100 MG: 100 TABLET, FILM COATED ORAL at 08:41

## 2022-02-28 RX ADMIN — FUROSEMIDE 40 MG: 40 TABLET ORAL at 08:41

## 2022-02-28 RX ADMIN — FUROSEMIDE 40 MG: 40 TABLET ORAL at 21:32

## 2022-02-28 RX ADMIN — HYDRALAZINE HYDROCHLORIDE 100 MG: 50 TABLET, FILM COATED ORAL at 17:21

## 2022-02-28 RX ADMIN — ASPIRIN 81 MG: 81 TABLET, COATED ORAL at 08:41

## 2022-02-28 RX ADMIN — TAMSULOSIN HYDROCHLORIDE 0.4 MG: 0.4 CAPSULE ORAL at 08:41

## 2022-02-28 RX ADMIN — TAZOBACTAM SODIUM AND PIPERACILLIN SODIUM 3.38 G: 375; 3 INJECTION, SOLUTION INTRAVENOUS at 23:37

## 2022-02-28 RX ADMIN — VANCOMYCIN HYDROCHLORIDE 500 MG: 500 INJECTION, POWDER, LYOPHILIZED, FOR SOLUTION INTRAVENOUS at 21:05

## 2022-02-28 RX ADMIN — TAZOBACTAM SODIUM AND PIPERACILLIN SODIUM 3.38 G: 375; 3 INJECTION, SOLUTION INTRAVENOUS at 14:33

## 2022-02-28 RX ADMIN — LEVOTHYROXINE SODIUM 125 MCG: 0.12 TABLET ORAL at 06:31

## 2022-02-28 RX ADMIN — HYDRALAZINE HYDROCHLORIDE 100 MG: 50 TABLET, FILM COATED ORAL at 21:32

## 2022-02-28 RX ADMIN — HYDROCODONE BITARTRATE AND ACETAMINOPHEN 1 TABLET: 5; 325 TABLET ORAL at 17:21

## 2022-02-28 RX ADMIN — Medication 5000 UNITS: at 08:41

## 2022-02-28 RX ADMIN — LOSARTAN POTASSIUM 50 MG: 50 TABLET, FILM COATED ORAL at 14:32

## 2022-02-28 RX ADMIN — PANTOPRAZOLE SODIUM 40 MG: 40 TABLET, DELAYED RELEASE ORAL at 06:31

## 2022-02-28 RX ADMIN — INSULIN LISPRO 3 UNITS: 100 INJECTION, SOLUTION INTRAVENOUS; SUBCUTANEOUS at 08:40

## 2022-02-28 RX ADMIN — ROPINIROLE HYDROCHLORIDE 0.75 MG: 0.5 TABLET, FILM COATED ORAL at 21:32

## 2022-02-28 RX ADMIN — OLANZAPINE 5 MG: 5 TABLET ORAL at 21:32

## 2022-02-28 RX ADMIN — OLANZAPINE 5 MG: 5 TABLET ORAL at 08:41

## 2022-03-01 ENCOUNTER — APPOINTMENT (OUTPATIENT)
Dept: ULTRASOUND IMAGING | Facility: HOSPITAL | Age: 57
End: 2022-03-01

## 2022-03-01 LAB
ALBUMIN SERPL-MCNC: 2.3 G/DL (ref 3.5–5.2)
ALP SERPL-CCNC: 715 U/L (ref 39–117)
ALT SERPL W P-5'-P-CCNC: 11 U/L (ref 1–33)
ANION GAP SERPL CALCULATED.3IONS-SCNC: 11 MMOL/L (ref 5–15)
AST SERPL-CCNC: 26 U/L (ref 1–32)
BASOPHILS # BLD AUTO: 0.08 10*3/MM3 (ref 0–0.2)
BASOPHILS NFR BLD AUTO: 0.7 % (ref 0–1.5)
BILIRUB CONJ SERPL-MCNC: 0.6 MG/DL (ref 0–0.3)
BILIRUB INDIRECT SERPL-MCNC: 0.3 MG/DL
BILIRUB SERPL-MCNC: 0.9 MG/DL (ref 0–1.2)
BUN SERPL-MCNC: 19 MG/DL (ref 6–20)
BUN/CREAT SERPL: 8.1 (ref 7–25)
CALCIUM SPEC-SCNC: 7.7 MG/DL (ref 8.6–10.5)
CHLORIDE SERPL-SCNC: 93 MMOL/L (ref 98–107)
CO2 SERPL-SCNC: 25 MMOL/L (ref 22–29)
CREAT SERPL-MCNC: 2.36 MG/DL (ref 0.57–1)
DEPRECATED RDW RBC AUTO: 51.8 FL (ref 37–54)
EGFRCR SERPLBLD CKD-EPI 2021: 23.6 ML/MIN/1.73
EOSINOPHIL # BLD AUTO: 0.28 10*3/MM3 (ref 0–0.4)
EOSINOPHIL NFR BLD AUTO: 2.6 % (ref 0.3–6.2)
ERYTHROCYTE [DISTWIDTH] IN BLOOD BY AUTOMATED COUNT: 16.9 % (ref 12.3–15.4)
GLUCOSE BLDC GLUCOMTR-MCNC: 175 MG/DL (ref 70–130)
GLUCOSE BLDC GLUCOMTR-MCNC: 193 MG/DL (ref 70–130)
GLUCOSE BLDC GLUCOMTR-MCNC: 202 MG/DL (ref 70–130)
GLUCOSE BLDC GLUCOMTR-MCNC: 228 MG/DL (ref 70–130)
GLUCOSE SERPL-MCNC: 217 MG/DL (ref 65–99)
HAV IGM SERPL QL IA: NORMAL
HBV CORE IGM SERPL QL IA: NORMAL
HBV SURFACE AG SERPL QL IA: NORMAL
HCT VFR BLD AUTO: 30.5 % (ref 34–46.6)
HCV AB SER DONR QL: NORMAL
HGB BLD-MCNC: 10.1 G/DL (ref 12–15.9)
IMM GRANULOCYTES # BLD AUTO: 0.12 10*3/MM3 (ref 0–0.05)
IMM GRANULOCYTES NFR BLD AUTO: 1.1 % (ref 0–0.5)
INR PPP: 0.99 (ref 0.9–1.1)
LYMPHOCYTES # BLD AUTO: 1.87 10*3/MM3 (ref 0.7–3.1)
LYMPHOCYTES NFR BLD AUTO: 17.4 % (ref 19.6–45.3)
MCH RBC QN AUTO: 28.1 PG (ref 26.6–33)
MCHC RBC AUTO-ENTMCNC: 33.1 G/DL (ref 31.5–35.7)
MCV RBC AUTO: 84.7 FL (ref 79–97)
MONOCYTES # BLD AUTO: 0.95 10*3/MM3 (ref 0.1–0.9)
MONOCYTES NFR BLD AUTO: 8.9 % (ref 5–12)
NEUTROPHILS NFR BLD AUTO: 69.3 % (ref 42.7–76)
NEUTROPHILS NFR BLD AUTO: 7.43 10*3/MM3 (ref 1.7–7)
NRBC BLD AUTO-RTO: 0.1 /100 WBC (ref 0–0.2)
PLATELET # BLD AUTO: 236 10*3/MM3 (ref 140–450)
PMV BLD AUTO: 10.7 FL (ref 6–12)
POTASSIUM SERPL-SCNC: 3.8 MMOL/L (ref 3.5–5.2)
PROT SERPL-MCNC: 6.2 G/DL (ref 6–8.5)
PROTHROMBIN TIME: 13 SECONDS (ref 11.7–14.2)
RBC # BLD AUTO: 3.6 10*6/MM3 (ref 3.77–5.28)
SODIUM SERPL-SCNC: 129 MMOL/L (ref 136–145)
WBC NRBC COR # BLD: 10.73 10*3/MM3 (ref 3.4–10.8)

## 2022-03-01 PROCEDURE — 86707 HEPATITIS BE ANTIBODY: CPT | Performed by: STUDENT IN AN ORGANIZED HEALTH CARE EDUCATION/TRAINING PROGRAM

## 2022-03-01 PROCEDURE — 87517 HEPATITIS B DNA QUANT: CPT | Performed by: STUDENT IN AN ORGANIZED HEALTH CARE EDUCATION/TRAINING PROGRAM

## 2022-03-01 PROCEDURE — 86706 HEP B SURFACE ANTIBODY: CPT | Performed by: STUDENT IN AN ORGANIZED HEALTH CARE EDUCATION/TRAINING PROGRAM

## 2022-03-01 PROCEDURE — 97110 THERAPEUTIC EXERCISES: CPT

## 2022-03-01 PROCEDURE — 85025 COMPLETE CBC W/AUTO DIFF WBC: CPT | Performed by: INTERNAL MEDICINE

## 2022-03-01 PROCEDURE — 87340 HEPATITIS B SURFACE AG IA: CPT | Performed by: STUDENT IN AN ORGANIZED HEALTH CARE EDUCATION/TRAINING PROGRAM

## 2022-03-01 PROCEDURE — 86705 HEP B CORE ANTIBODY IGM: CPT | Performed by: STUDENT IN AN ORGANIZED HEALTH CARE EDUCATION/TRAINING PROGRAM

## 2022-03-01 PROCEDURE — 99223 1ST HOSP IP/OBS HIGH 75: CPT | Performed by: STUDENT IN AN ORGANIZED HEALTH CARE EDUCATION/TRAINING PROGRAM

## 2022-03-01 PROCEDURE — 76705 ECHO EXAM OF ABDOMEN: CPT

## 2022-03-01 PROCEDURE — 82962 GLUCOSE BLOOD TEST: CPT

## 2022-03-01 PROCEDURE — 63710000001 INSULIN LISPRO (HUMAN) PER 5 UNITS: Performed by: HOSPITALIST

## 2022-03-01 PROCEDURE — 80076 HEPATIC FUNCTION PANEL: CPT | Performed by: STUDENT IN AN ORGANIZED HEALTH CARE EDUCATION/TRAINING PROGRAM

## 2022-03-01 PROCEDURE — 81596 NFCT DS CHRNC HCV 6 ASSAYS: CPT | Performed by: STUDENT IN AN ORGANIZED HEALTH CARE EDUCATION/TRAINING PROGRAM

## 2022-03-01 PROCEDURE — 85610 PROTHROMBIN TIME: CPT | Performed by: STUDENT IN AN ORGANIZED HEALTH CARE EDUCATION/TRAINING PROGRAM

## 2022-03-01 PROCEDURE — 86704 HEP B CORE ANTIBODY TOTAL: CPT | Performed by: STUDENT IN AN ORGANIZED HEALTH CARE EDUCATION/TRAINING PROGRAM

## 2022-03-01 PROCEDURE — 80048 BASIC METABOLIC PNL TOTAL CA: CPT | Performed by: INTERNAL MEDICINE

## 2022-03-01 PROCEDURE — 87350 HEPATITIS BE AG IA: CPT | Performed by: STUDENT IN AN ORGANIZED HEALTH CARE EDUCATION/TRAINING PROGRAM

## 2022-03-01 RX ADMIN — BISACODYL 10 MG: 5 TABLET ORAL at 17:52

## 2022-03-01 RX ADMIN — HYDROCODONE BITARTRATE AND ACETAMINOPHEN 1 TABLET: 5; 325 TABLET ORAL at 06:09

## 2022-03-01 RX ADMIN — ROPINIROLE HYDROCHLORIDE 0.75 MG: 0.5 TABLET, FILM COATED ORAL at 23:02

## 2022-03-01 RX ADMIN — METOPROLOL TARTRATE 25 MG: 25 TABLET, FILM COATED ORAL at 08:09

## 2022-03-01 RX ADMIN — OLANZAPINE 5 MG: 5 TABLET ORAL at 23:02

## 2022-03-01 RX ADMIN — INSULIN LISPRO 2 UNITS: 100 INJECTION, SOLUTION INTRAVENOUS; SUBCUTANEOUS at 17:50

## 2022-03-01 RX ADMIN — FUROSEMIDE 40 MG: 40 TABLET ORAL at 23:01

## 2022-03-01 RX ADMIN — OLANZAPINE 5 MG: 5 TABLET ORAL at 08:09

## 2022-03-01 RX ADMIN — HYDRALAZINE HYDROCHLORIDE 100 MG: 50 TABLET, FILM COATED ORAL at 23:01

## 2022-03-01 RX ADMIN — LOSARTAN POTASSIUM 50 MG: 50 TABLET, FILM COATED ORAL at 08:09

## 2022-03-01 RX ADMIN — INSULIN LISPRO 3 UNITS: 100 INJECTION, SOLUTION INTRAVENOUS; SUBCUTANEOUS at 08:08

## 2022-03-01 RX ADMIN — HYDRALAZINE HYDROCHLORIDE 100 MG: 50 TABLET, FILM COATED ORAL at 08:08

## 2022-03-01 RX ADMIN — PANTOPRAZOLE SODIUM 40 MG: 40 TABLET, DELAYED RELEASE ORAL at 06:07

## 2022-03-01 RX ADMIN — HYDRALAZINE HYDROCHLORIDE 100 MG: 50 TABLET, FILM COATED ORAL at 17:49

## 2022-03-01 RX ADMIN — Medication 5000 UNITS: at 08:09

## 2022-03-01 RX ADMIN — FOLIC ACID 1 MG: 1 TABLET ORAL at 08:09

## 2022-03-01 RX ADMIN — HYDROCODONE BITARTRATE AND ACETAMINOPHEN 1 TABLET: 5; 325 TABLET ORAL at 17:53

## 2022-03-01 RX ADMIN — ASPIRIN 81 MG: 81 TABLET, COATED ORAL at 08:08

## 2022-03-01 RX ADMIN — AMLODIPINE BESYLATE 10 MG: 10 TABLET ORAL at 08:09

## 2022-03-01 RX ADMIN — SERTRALINE 100 MG: 100 TABLET, FILM COATED ORAL at 08:09

## 2022-03-01 RX ADMIN — FUROSEMIDE 40 MG: 40 TABLET ORAL at 08:09

## 2022-03-01 RX ADMIN — TAMSULOSIN HYDROCHLORIDE 0.4 MG: 0.4 CAPSULE ORAL at 08:09

## 2022-03-01 RX ADMIN — LEVOTHYROXINE SODIUM 125 MCG: 0.12 TABLET ORAL at 06:07

## 2022-03-01 NOTE — PLAN OF CARE
Goal Outcome Evaluation:  Plan of Care Reviewed With: patient        Progress: improving  Outcome Summary: Pt tolerated treatment fair this date. Pt declined ambulation, stating she just recently ambulated w/ nsg. Agreed to exercises while up in chair. Pt completed several LE exercises and was encouraged to attempt on own later, as well as to ambulate again w/ staff.

## 2022-03-01 NOTE — PROGRESS NOTES
Name: Zaina Martinez ADMIT: 2022   : 1965  PCP: Karson Oreilly MD    MRN: [id number] LOS: 6 days   AGE/SEX: 56 y.o. female  ROOM: [facility]     Subjective   Subjective     Patient is sitting up on the chair and has no specific complaints.  Denies nausea, vomiting abdominal pain, chest pain.  Mental status is at baseline.       Objective   Objective   Vital Signs  Temp:  [97.6 °F (36.4 °C)-98 °F (36.7 °C)] 97.8 °F (36.6 °C)  Heart Rate:  [55-62] 55  Resp:  [16-18] 18  BP: (149-170)/(76-89) 149/76  SpO2:  [96 %-98 %] 98 %  on   ;   Device (Oxygen Therapy): room air  Body mass index is 25 kg/m².  Physical Exam   HEENT:  Atraumatic, normocephalic.  PERRLA.  Extraocular movements intact.  Conjunctivae pink.  Sclerae, no icterus.  Mucous membranes dry.   NECK:  Supple.  No JVD.  HEART:  Regular rate and rhythm.  Normal S1, S2.  LUNGS:  Fairly clear to auscultation anteriorly.  No wheezes.  No crackles.  ABDOMEN:   Soft, nontender.  Bowel sounds present.  No rebound.  No  guarding.  EXTREMITIES:  No cyanosis, clubbing, or edema.  Palpable pedal pulses.  NEURO:  Grossly nonfocal.  No facial asymmetry.  Good strength in all 4  extremities.  SKIN:  Warm and dry.  No evidence of rashes.  Dialysis catheter noted on the right chest.  LYMPH NODES:  No palpable cervical or supraclavicular lymphadenopathy.      Results Review     I reviewed the patient's new clinical results.  Results from last 7 days   Lab Units 22  0545 22  0708 22  0453 02/26/22  0722   WBC 10*3/mm3 10.73 11.55* 10.13 11.39*   HEMOGLOBIN g/dL 10.1* 10.3* 9.9* 9.3*   PLATELETS 10*3/mm3 236 289 247 240     Results from last 7 days   Lab Units 22  0545 22  0708 22  0453 22  0722 22  0531 22  0531   SODIUM mmol/L 129* 133* 130* 132*   < > 135*   POTASSIUM mmol/L 3.8 4.5 4.0 4.2   < > 4.4   CHLORIDE mmol/L 93* 99 99 98   < > 97*   CO2 mmol/L 25.0 20.7* 22.0 25.0   < > 26.1   BUN mg/dL 19 25* 11 19    < > 34*   CREATININE mg/dL 2.36* 3.39* 1.94* 3.13*   < > 4.45*   GLUCOSE mg/dL 217* 211* 199* 237*   < > 55*   EGFR IF NONAFRICN AM mL/min/1.73  --  14* 27* 15*  --  10*    < > = values in this interval not displayed.     Results from last 7 days   Lab Units 03/01/22  0545 02/28/22  0708 02/27/22  0453 02/25/22  0531 02/24/22  1257 02/23/22  1604   ALBUMIN g/dL 2.30* 2.40* 2.70* 2.20*   < > 3.00*   BILIRUBIN mg/dL 0.9  --   --   --   --  1.5*   ALK PHOS U/L 715*  --   --   --   --  1,330*   AST (SGOT) U/L 26  --   --   --   --  61*   ALT (SGPT) U/L 11  --   --   --   --  17    < > = values in this interval not displayed.     Results from last 7 days   Lab Units 03/01/22  0545 02/28/22  0708 02/27/22 0453 02/26/22  0722 02/25/22  0531 02/25/22  0531 02/24/22  1257 02/24/22  1257   CALCIUM mg/dL 7.7* 8.1* 7.8* 7.5*   < > 7.6*   < > 7.7*   ALBUMIN g/dL 2.30* 2.40* 2.70*  --   --  2.20*   < > 2.50*   PHOSPHORUS mg/dL  --  3.9 2.4*  --   --  4.6*  --  3.5    < > = values in this interval not displayed.     Results from last 7 days   Lab Units 02/26/22 0722   PROCALCITONIN ng/mL 0.47*     COVID19   Date Value Ref Range Status   02/23/2022 Not Detected Not Detected - Ref. Range Final   01/09/2022 Not Detected Not Detected - Ref. Range Final     Glucose   Date/Time Value Ref Range Status   03/01/2022 1548 175 (H) 70 - 130 mg/dL Final     Comment:     Meter: [id number] : [id number] Johann Moreno    03/01/2022 1044 193 (H) 70 - 130 mg/dL Final     Comment:     Meter: [id number] : [id number] Rashmideclan Kelloggni    03/01/2022 0606 228 (H) 70 - 130 mg/dL Final     Comment:     Meter: [id number] : [id number] Rex Farihadonte NA   02/28/2022 2208 278 (H) 70 - 130 mg/dL Final     Comment:     Meter: [id number] : [id number] Moeller Yulissa    02/28/2022 1609 256 (H) 70 - 130 mg/dL Final     Comment:     Meter: [id number] : [id number] Vivian Duke NA   02/28/2022 1051 164 (H) 70 - 130 mg/dL Final     Comment:     Meter:  [id number] : [id number] Vivian SAAVEDRA   02/28/2022 0638 219 (H) 70 - 130 mg/dL Final     Comment:     Meter: [id number] : [id number] Donaldforrest Sweeney Constantin       CT Guided Biopsy Aspiration Injection  Narrative: CT GUIDED ASPIRATION     HISTORY:  drain post op fluid collection     COMPARISON: CT 02/23/2022.     PROCEDURE DETAILS: After informed consent was obtained from the patient,  the patient was placed on the CT scanner in supine position. A nurse was  continuously present and moderate sedation was performed under physician  supervision from 1119 to 1136 hours with a total of 50 mcg fentanyl and  1 mg versed administered. A preliminary scan of the abdomen was obtained  and the abdominal fluid collection identified.  A route was planned for  the aspiration/drainage and an appropriate skin site identified. This  site was then prepped and draped in the usual sterile manner and the  skin anesthetized with lidocaine. The tract to the collection was  anesthetized with lidocaine and a Yueh needle catheter placed within the  collection. Approximately 15 mL sanguinous fluid could be aspirated and  a specimen was submitted to lab. As aspirate and imaging implied the  presence of the hematoma without evident infection no drain was placed.  The patient tolerated the procedure well and there were no immediate  complications.  All elements of maximal sterile technique were followed.  Radiation dose reduction techniques were utilized, including automated  exposure control and exposure modulation      Impression: Successful aspiration of hemorrhagic fluid.     This report was finalized on 2/25/2022 5:41 PM by Dr. Olvin Balderas M.D.       Scheduled Medications  amLODIPine, 10 mg, Oral, Daily  aspirin, 81 mg, Oral, Daily  cholecalciferol, 5,000 Units, Oral, Daily  folic acid, 1 mg, Oral, Daily  furosemide, 40 mg, Oral, BID  hydrALAZINE, 100 mg, Oral, TID  insulin lispro, 0-7 Units, Subcutaneous, TID  AC  levothyroxine, 125 mcg, Oral, Q AM  losartan, 50 mg, Oral, Q24H  metoprolol tartrate, 25 mg, Oral, BID  OLANZapine, 5 mg, Oral, BID  pantoprazole, 40 mg, Oral, Q AM  rOPINIRole, 0.75 mg, Oral, Nightly  sertraline, 100 mg, Oral, Daily  tamsulosin, 0.4 mg, Oral, Daily    Infusions   Diet  Diet Regular; Consistent Carbohydrate       Assessment/Plan     Active Hospital Problems    Diagnosis  POA   • **Metabolic encephalopathy [G93.41]  Yes   • Abnormal findings on diagnostic imaging of abdomen [R93.5]  Yes   • Status post cholecystectomy [Z90.49]  Not Applicable   • Hyponatremia [E87.1]  Yes   • Leukocytosis [D72.829]  Yes   • Anemia due to chronic kidney disease, on chronic dialysis ([facility]) [N18.6, D63.1, Z99.2]  Not Applicable   • Chronic diastolic CHF (congestive heart failure) ([facility]) [I50.32]  Yes   • ESRD (end stage renal disease) ([facility]) [N18.6]  Yes   • CAD (coronary artery disease) [I25.10]  Yes   • Medically noncompliant [Z91.19]  Not Applicable   • Acute DM type 2 causing complication ([facility]) [E11.8]  Yes   • Anemia [D64.9]  Yes      Resolved Hospital Problems   No resolved problems to display.       56 y.o. female admitted with Metabolic encephalopathy.     1. Acute metabolic encephalopathy, etiology is uncertain at this point and mental status is now close to baseline.  CT brain on 02/23/2022 did not reveal any acute findings.    2. Underwent cholecystectomy few weeks ago and now has intra-abdominal fluid collection, underwent CT-guided aspiration and empirically was on  Vancomycin/ Zosyn which will be discontinued and so far cultures did not reveal any growth. WBC is back to normal.     3. History of coronary artery disease, on aspirin, metoprolol and will consider statins upon discharge.      4. End-stage renal disease, continue with routine dialysis.      5. Hypertension, on amlodipine, hydralazine, metoprolol who which will be continued.    6. Diabetes mellitus, on corrective dose insulin and monitor blood  blood sugars closely.    7. Hypothyroidism, on Synthroid.      8. GERD, acid suppressive therapy.    9.  Elevated BNP, currently no evidence of any heart failure clinically.  Echo done in November 2021 revealed normal ejection fraction.    10.  Hepatitis-B surface antigen positive,  Did come back positive and was negative in the past.  Therefore infectious disease consult will be obtained.      Chao Fontanez MD  [facility]  03/01/22  17:33 EST

## 2022-03-01 NOTE — PROGRESS NOTES
[facility] Progress Note      Patient Name: Zaina Martinez  : 1965  MRN: [id number]  Primary Care Physician:  Karson Oreilly MD  Date of admission: 2022    Subjective     Interval History:   Follow up ESRD.  Abdominal pain better.  Sore.  Eating. Bowels moving. Not soa.      Review of Systems:   As noted above    Objective     Vitals:   Temp:  [97.6 °F (36.4 °C)-98.1 °F (36.7 °C)] 97.6 °F (36.4 °C)  Heart Rate:  [60-64] 60  Resp:  [16-18] 18  BP: (147-174)/(68-95) 170/78    Intake/Output Summary (Last 24 hours) at 3/1/2022 0848  Last data filed at 3/1/2022 0409  Gross per 24 hour   Intake 700 ml   Output --   Net 700 ml       Physical Exam:    General Appearance: alert, oriented x 3, no acute distress   Skin: warm and dry  HEENT: oral mucosa normal, nonicteric sclera  Neck: supple, no JVD  Lungs: Clear to auscultation.   Heart: RRR, no s3 or rub  Abdomen: soft, nontender, non-distended. + bs  Extremities: no edema lower ext.   Neuro: normal speech and mental status     Scheduled Meds:     amLODIPine, 10 mg, Oral, Daily  aspirin, 81 mg, Oral, Daily  cholecalciferol, 5,000 Units, Oral, Daily  folic acid, 1 mg, Oral, Daily  furosemide, 40 mg, Oral, BID  hydrALAZINE, 100 mg, Oral, TID  insulin lispro, 0-7 Units, Subcutaneous, TID AC  levothyroxine, 125 mcg, Oral, Q AM  losartan, 50 mg, Oral, Q24H  metoprolol tartrate, 25 mg, Oral, BID  OLANZapine, 5 mg, Oral, BID  pantoprazole, 40 mg, Oral, Q AM  piperacillin-tazobactam, 3.375 g, Intravenous, Q12H  rOPINIRole, 0.75 mg, Oral, Nightly  sertraline, 100 mg, Oral, Daily  tamsulosin, 0.4 mg, Oral, Daily      IV Meds:        Results Reviewed:   I have personally reviewed the results from the time of this admission to 3/1/2022 08:48 EST     Results from last 7 days   Lab Units 22  0545 22  0708 22  0453 22  0519 22  1604   SODIUM mmol/L 129* 133* 130*   < > 133*   POTASSIUM mmol/L 3.8 4.5 4.0   < > 4.8    CHLORIDE mmol/L 93* 99 99   < > 91*   CO2 mmol/L 25.0 20.7* 22.0   < > 25.0   BUN mg/dL 19 25* 11   < > 21*   CREATININE mg/dL 2.36* 3.39* 1.94*   < > 3.45*   CALCIUM mg/dL 7.7* 8.1* 7.8*   < > 8.3*   BILIRUBIN mg/dL  --   --   --   --  1.5*   ALK PHOS U/L  --   --   --   --  1,330*   ALT (SGPT) U/L  --   --   --   --  17   AST (SGOT) U/L  --   --   --   --  61*   GLUCOSE mg/dL 217* 211* 199*   < > 126*    < > = values in this interval not displayed.       Estimated Creatinine Clearance: 23.1 mL/min (A) (by C-G formula based on SCr of 2.36 mg/dL (H)).    Results from last 7 days   Lab Units 02/28/22  0708 02/27/22  0453 02/25/22  0531   PHOSPHORUS mg/dL 3.9 2.4* 4.6*       Results from last 7 days   Lab Units 02/24/22  1257   URIC ACID mg/dL 6.2*       Results from last 7 days   Lab Units 03/01/22  0545 02/28/22  0708 02/27/22  0453 02/26/22  0722 02/25/22  0531   WBC 10*3/mm3 10.73 11.55* 10.13 11.39* 13.08*   HEMOGLOBIN g/dL 10.1* 10.3* 9.9* 9.3* 9.6*   PLATELETS 10*3/mm3 236 289 247 240 254             Assessment / Plan     ASSESSMENT:  1. ESRD.  HD MWF at [facility] dialysis unit.   Sodium 129 with glucose over 200.   2. Abdominal pain. Sp cholecycystectomy   SP fluid collection aspiration.   Improved.   3. Anemia CKD.  Procrit with HD.  4. Encephalopathy improved.   5. HTN not controlled. Losartan started yesterday.   6. DM2  7. Hypothyroid on replacement.   8. Hep b surface AG positive. Core IgM negative. Dr. Fontanez addressing.   PLAN:  1. Ok with renal for dc when Dr. Fontanez feels appropriate.  2. GO to outpt dialysis unit tomorrow as scheduled.         Ruth Lira MD  03/01/22  08:48 [facility]    [facility]  [phone]

## 2022-03-01 NOTE — CONSULTS
"Referring Provider: Starla Boston MD  Reason for Consultation: Hep B   Chief Complaint   Patient presents with   • Altered Mental Status         Subjective   History of present illness: Patient is a 56-year-old female with past medical history of ESRD on dialysis and recent history of laparoscopic cholecystectomy on January 11 who presented in the setting of altered mental status and missing a session of dialysis.  ID was consulted for positive hepatitis B testing.    On presentation patient was afebrile with leukocytosis of 13 that is now improved.  She was started on empiric antibiotics with vancomycin and Zosyn in the setting of CT findings of a fluid collection.  Collection was tapped by general surgery and cultures were no growth and therefore antibiotics were stopped.  Hepatitis testing was done and patient resulted with a positive hepatitis B surface antigen.    Patient reports she has not been sexually active for \"some time\" but has been monogamous for many years with her .  She reports she knows no family members to be positive with hepatitis B.  This includes her parents for her .  She denies any prior hepatitis B positive testing.     Past Medical History:   Diagnosis Date   • Acute on chronic diastolic CHF (congestive heart failure) (HCC)    • CAD (coronary artery disease) 12/20/2021   • Diabetes (HCC)    • Disease of thyroid gland    • GERD (gastroesophageal reflux disease)    • Hyperlipidemia 11/30/2018   • Hypertension    • Rheumatoid arthritis (HCC)        Past Surgical History:   Procedure Laterality Date   • CHOLECYSTECTOMY WITH INTRAOPERATIVE CHOLANGIOGRAM N/A 1/10/2022    Procedure: Laparoscopic cholecystectomy with intraoperative cholangiogram;  Surgeon: Ramana Raygoza MD;  Location: [facility];  Service: General;  Laterality: N/A;   • EYE SURGERY     • HYSTERECTOMY     • INSERTION HEMODIALYSIS CATHETER N/A 12/6/2021    Procedure: HEMODIALYSIS CATHETER INSERTION;  " Surgeon: Keli Salazar MD;  Location: [facility] 18/19;  Service: Vascular;  Laterality: N/A;       family history is not on file.     reports that she has never smoked. She has never used smokeless tobacco. She reports that she does not drink alcohol and does not use drugs.     Allergies   Allergen Reactions   • Ibuprofen Other (See Comments)     Kidney disease   • Prochlorperazine Other (See Comments)     Dystonic reaction            Medication:  Antibiotics:  Anti-Infectives (From admission, onward)    Ordered     Dose/Rate Route Frequency Start Stop    02/28/22 0833  vancomycin 500 mg/100 mL 0.9% NS IVPB (mbp)        Ordering Provider: Starla Boston MD    500 mg  over 30 Minutes Intravenous Once When Specified 02/28/22 2000 02/28/22 2135    02/26/22 0819  vancomycin 500 mg/100 mL 0.9% NS IVPB (mbp)        Ordering Provider: Starla Boston MD    500 mg Intravenous Once 02/26/22 1600 02/26/22 1621    02/25/22 1217  vancomycin 500 mg/100 mL 0.9% NS IVPB (mbp)        Ordering Provider: Fran Yo MD    500 mg Intravenous Once 02/25/22 1800 02/25/22 1837    02/23/22 2147  piperacillin-tazobactam (ZOSYN) 3.375 g in iso-osmotic dextrose 50 ml (premix)        Ordering Provider: Starla Boston MD    3.375 g  over 4 Hours Intravenous Every 12 Hours 02/24/22 1100 03/01/22 1059    02/24/22 0914  Vancomycin Pharmacy Intermittent Dosing        Ordering Provider: Starla Boston MD     Does not apply Daily 02/24/22 1000 02/28/22 2359    02/23/22 2331  vancomycin 1250 mg/250 mL 0.9% NS IVPB ([facility])        Ordering Provider: Starla Boston MD    20 mg/kg × 64.9 kg Intravenous Once 02/24/22 0030 02/24/22 0218    02/23/22 2147  piperacillin-tazobactam (ZOSYN) 3.375 g in iso-osmotic dextrose 50 ml (premix)        Ordering Provider: Starla Boston MD    3.375 g  over 30 Minutes Intravenous Once 02/23/22 2245 02/23/22 2314    02/23/22 1109  Pharmacy to dose vancomycin         Ordering Provider: Starla Boston MD     Does not apply Continuous PRN 02/23/22 2147 02/28/22 2146          Review of Systems  Pertinent items are noted in HPI, all other systems reviewed and negative    Objective     Physical Exam:   Vital Signs   Temp:  [97.6 °F (36.4 °C)-98.1 °F (36.7 °C)] 97.6 °F (36.4 °C)  Heart Rate:  [60-64] 60  Resp:  [16-18] 18  BP: (147-174)/(68-95) 170/78    GENERAL: Awake and alert, in no acute distress.   HEENT: Oropharynx is clear. Hearing is grossly normal.   EYES: PERRL. No conjunctival injection. No lid lag.   LYMPHATICS: No lymphadenopathy of the neck or inguinal regions.   HEART: Regular rate and regular rhythm. No peripheral edema.   LUNGS: Clear to auscultation anteriorly with normal respiratory effort.   GI: Soft, right lower quadrant tenderness, nondistended. No appreciable organomegaly.   SKIN: Warm and dry without cutaneous eruptions   PSYCHIATRIC: Appropriate mood, affect, insight, and judgment.     Results Review:   I reviewed the patient's new clinical results.  I reviewed the patient's new imaging results and agree with the interpretation.  I reviewed the patient's other test results and agree with the interpretation    Lab Results   Component Value Date    WBC 10.73 03/01/2022    HGB 10.1 (L) 03/01/2022    HCT 30.5 (L) 03/01/2022    MCV 84.7 03/01/2022     03/01/2022       Lab Results   Component Value Date    VANCORANDOM 14.70 02/28/2022       Lab Results   Component Value Date    GLUCOSE 217 (H) 03/01/2022    BUN 19 03/01/2022    CREATININE 2.36 (H) 03/01/2022    EGFRIFNONA 14 (L) 02/28/2022    EGFRIFAFRI  01/13/2022      Comment:      <15 Indicative of kidney failure.    BCR 8.1 03/01/2022    CO2 25.0 03/01/2022    CALCIUM 7.7 (L) 03/01/2022    ALBUMIN 2.40 (L) 02/28/2022    AST 61 (H) 02/23/2022    ALT 17 02/23/2022         Estimated Creatinine Clearance: 23.1 mL/min (A) (by C-G formula based on SCr of 2.36 mg/dL (H)).    Component      Latest Ref  Rng & Units 2/25/2022   Hepatitis B Surface Ag      Negative Confirm. indicated   Hepatitis B Surface Ag Confirm       Positive (A)       Microbiology:  2/23 Covid negative  2/23 blood cultures no growth  2/25 abdominal fluid cultures no growth    Radiology:  2/23 CT of the abdomen and pelvis report reviewed with 7.8 cm rounded collection in the right abdomen.  No specific findings to suggest abscess.    2/23 CT of the head report reviewed with no findings of acute intracranial hemorrhage, midline shift, mass-effect or infarct.    2/23 chest x-ray reviewed by me with no evidence of acute infiltrate.    Assessment/Plan   #Hepatitis B surface antigen positive  #Metabolic encephalopathy, improved  #End-stage renal disease on hemodialysis    Patient has positive confirmation testing for hepatitis B surface antigen.  Given this we will add on hepatitis B quantitative DNA and hepatitis B panel to confirm hepatitis B infection and stage.  We will repeat LFTs and obtain INR for further evaluation of her liver function.  We will also obtain baseline liver imaging with ultrasound to accompany fibrosure testing.  Recommend follow-up with GI for review of her imaging as well as to discuss treatment based on the above testing.  Given the chronicity of this infection there is no urgency to starting treatment and suggest follow-up outpatient with GI once these tests have returned.    Thank you for allowing me to be involved in the care of this patient. Infectious diseases will sign off at this time with antibiotics plan in place, but please call me at [phone] if any further ID questions or new ID concerns.

## 2022-03-01 NOTE — THERAPY TREATMENT NOTE
Patient Name: Zaina Martinez  : 1965    MRN: [id number]                              Today's Date: 3/1/2022       Admit Date: 2022    Visit Dx:     ICD-10-CM ICD-9-CM   1. Altered mental status, unspecified altered mental status type  R41.82 780.97   2. ESRD (end stage renal disease) ([facility])  N18.6 585.6   3. Abnormal abdominal CT scan  R93.5 793.6     Patient Active Problem List   Diagnosis   • Renal insufficiency   • Hypertensive disorder   • Hypothyroidism   • Acute DM type 2 causing complication ([facility])   • Rheumatoid arthritis ([facility])   • Angioedema   • Esophageal dysmotility   • Anemia   • Medically noncompliant   • Myocardial infarction due to demand ischemia ([facility])   • Enteritis   • PRES (posterior reversible encephalopathy syndrome)   • Urine retention   • Klebsiella infection   • Superficial thrombophlebitis   • Generalized weakness   • ESRD (end stage renal disease) ([facility])   • CAD (coronary artery disease)   • Abnormal urinalysis   • Chronic diastolic CHF (congestive heart failure) ([facility])   • Pyelonephritis   • Calculus of gallbladder with acute on chronic cholecystitis without obstruction   • Pleural effusion on right   • Anemia due to chronic kidney disease, on chronic dialysis ([facility])   • Abnormal findings on diagnostic imaging of other specified body structures   • Acute upper respiratory infection   • Agitation   • Alkaline phosphatase raised   • Casts present in urine   • Cellulitis of toe   • Hip pain   • Community acquired pneumonia   • Depressive disorder   • Diarrhea of presumed infectious origin   • Difficult or painful urination   • Disease due to severe acute respiratory syndrome coronavirus 2 (SARS-CoV-2)   • Dyspnea   • Encounter for follow-up examination after completed treatment for conditions other than malignant neoplasm   • H/O: hypothyroidism   • Hyperlipidemia   • Hypomagnesemia   • Intractable vomiting with nausea   • Leukocytosis   • Luetscher's syndrome   • Need for influenza  vaccination   • Restless legs   • Noncompliance with treatment   • Shoulder pain   • Urinary tract infectious disease   • Metabolic encephalopathy   • Abnormal findings on diagnostic imaging of abdomen   • Status post cholecystectomy   • Hyponatremia   • Leukocytosis     Past Medical History:   Diagnosis Date   • Acute on chronic diastolic CHF (congestive heart failure) (HCC)    • CAD (coronary artery disease) 12/20/2021   • Diabetes (HCC)    • Disease of thyroid gland    • GERD (gastroesophageal reflux disease)    • Hyperlipidemia 11/30/2018   • Hypertension    • Rheumatoid arthritis (HCC)      Past Surgical History:   Procedure Laterality Date   • CHOLECYSTECTOMY WITH INTRAOPERATIVE CHOLANGIOGRAM N/A 1/10/2022    Procedure: Laparoscopic cholecystectomy with intraoperative cholangiogram;  Surgeon: Ramana Raygoza MD;  Location: [facility];  Service: General;  Laterality: N/A;   • EYE SURGERY     • HYSTERECTOMY     • INSERTION HEMODIALYSIS CATHETER N/A 12/6/2021    Procedure: HEMODIALYSIS CATHETER INSERTION;  Surgeon: Keli Salazar MD;  Location: [facility] 18/19;  Service: Vascular;  Laterality: N/A;      General Information     Row Name 03/01/22 1612          Physical Therapy Time and Intention    Document Type therapy note (daily note)  -     Mode of Treatment physical therapy  -     Row Name 03/01/22 1612          General Information    Existing Precautions/Restrictions fall  -     Row Name 03/01/22 1612          Cognition    Orientation Status (Cognition) oriented x 3  -           User Key  (r) = Recorded By, (t) = Taken By, (c) = Cosigned By    Initials Name Provider Type     Tesha Baires PTA Physical Therapy Assistant               Mobility     Row Name 03/01/22 1612          Bed Mobility    Comment (Bed Mobility) pt declined mobility d/t recently ambulating w/ nsg  -           User Key  (r) = Recorded By, (t) = Taken By, (c) = Cosigned By    Initials Name Provider Type     Tesha Zuniga PTA Physical Therapy Assistant               Obj/Interventions     Row Name 03/01/22 1612          Motor Skills    Therapeutic Exercise --  seated AP, LAQ, marches, hip abd/add x15 reps  -           User Key  (r) = Recorded By, (t) = Taken By, (c) = Cosigned By    Initials Name Provider Type    Tesha Zuniga PTA Physical Therapy Assistant               Goals/Plan    No documentation.                Clinical Impression     Row Name 03/01/22 1612          Pain    Additional Documentation Pain Scale: Numbers Pre/Post-Treatment (Group)  -     Row Name 03/01/22 1612          Pain Scale: Numbers Pre/Post-Treatment    Pretreatment Pain Rating 0/10 - no pain  -     Posttreatment Pain Rating 0/10 - no pain  -     Row Name 03/01/22 1612          Positioning and Restraints    Pre-Treatment Position sitting in chair/recliner  -     Post Treatment Position chair  -SM     In Chair sitting; call light within reach; encouraged to call for assist; exit alarm on; notified nsg  -           User Key  (r) = Recorded By, (t) = Taken By, (c) = Cosigned By    Initials Name Provider Type    Tesha Zuniga PTA Physical Therapy Assistant               Outcome Measures     Row Name 03/01/22 1613          How much help from another person do you currently need...    Turning from your back to your side while in flat bed without using bedrails? 3  -SM     Moving from lying on back to sitting on the side of a flat bed without bedrails? 3  -SM     Moving to and from a bed to a chair (including a wheelchair)? 3  -SM     Standing up from a chair using your arms (e.g., wheelchair, bedside chair)? 3  -SM     Climbing 3-5 steps with a railing? 2  -SM     To walk in hospital room? 3  -SM     AM-PAC 6 Clicks Score (PT) 17  -SM     Row Name 03/01/22 1613          Functional Assessment    Outcome Measure Options AM-PAC 6 Clicks Basic Mobility (PT)  -           User Key  (r) = Recorded By, (t) =  Taken By, (c) = Cosigned By    Initials Name Provider Type     Tesha Baires PTA Physical Therapy Assistant                             Physical Therapy Education                 Title: PT OT SLP Therapies (In Progress)     Topic: Physical Therapy (Done)     Point: Mobility training (Done)     Learning Progress Summary           Patient Acceptance, E, VU,NR by  at 2/28/2022 1603                   Point: Home exercise program (Done)     Learning Progress Summary           Patient Acceptance, E,TB,D, VU,NR by  at 3/1/2022 1614    Acceptance, E, VU,NR by  at 2/28/2022 1603                   Point: Body mechanics (Done)     Learning Progress Summary           Patient Acceptance, E, VU,NR by  at 2/28/2022 1603                   Point: Precautions (Done)     Learning Progress Summary           Patient Acceptance, E, VU,NR by  at 2/28/2022 1603                               User Key     Initials Effective Dates Name Provider Type Discipline     03/07/18 -  Tesha Baires PTA Physical Therapy Assistant PT     10/25/19 -  Corinna Barber PT Physical Therapist PT              PT Recommendation and Plan     Plan of Care Reviewed With: patient  Progress: improving  Outcome Summary: Pt tolerated treatment fair this date. Pt declined ambulation, stating she just recently ambulated w/ nsg. Agreed to exercises while up in chair. Pt completed several LE exercises and was encouraged to attempt on own later, as well as to ambulate again w/ staff.     Time Calculation:    PT Charges     Row Name 03/01/22 1619             Time Calculation    Start Time 1355  -      Stop Time 1407  -      Time Calculation (min) 12 min  -      PT Received On 03/01/22  -      PT - Next Appointment 03/02/22  -            User Key  (r) = Recorded By, (t) = Taken By, (c) = Cosigned By    Initials Name Provider Type    Tesha Zuniga PTA Physical Therapy Assistant              Therapy Charges for Today     Code  Description Service Date Service Provider Modifiers Qty    [id number] HC PT THER PROC EA 15 MIN 3/1/2022 Tesha Baires, PTA GP 1          PT G-Codes  Outcome Measure Options: AM-PAC 6 Clicks Basic Mobility (PT)  AM-PAC 6 Clicks Score (PT): 17    Tesha Baires PTA  3/1/2022

## 2022-03-01 NOTE — PLAN OF CARE
Goal Outcome Evaluation:  Plan of Care Reviewed With: patient        Progress: no change  Outcome Summary: vss overnight. rested quietly between care. pain meds prn x1 this shift-see mar. continue IV zosyn q12hr. intermittent confusion upon wakening, easy to reorient. care clustered to encourage rest. up with standby assist to bathroom.

## 2022-03-02 ENCOUNTER — READMISSION MANAGEMENT (OUTPATIENT)
Dept: CALL CENTER | Facility: HOSPITAL | Age: 57
End: 2022-03-02

## 2022-03-02 VITALS
RESPIRATION RATE: 16 BRPM | SYSTOLIC BLOOD PRESSURE: 155 MMHG | BODY MASS INDEX: 25.16 KG/M2 | WEIGHT: 137.57 LBS | OXYGEN SATURATION: 100 % | DIASTOLIC BLOOD PRESSURE: 86 MMHG | HEART RATE: 60 BPM | TEMPERATURE: 96.9 F

## 2022-03-02 LAB
ANION GAP SERPL CALCULATED.3IONS-SCNC: 12.8 MMOL/L (ref 5–15)
BACTERIA SPEC ANAEROBE CULT: NORMAL
BASOPHILS # BLD AUTO: 0.1 10*3/MM3 (ref 0–0.2)
BASOPHILS NFR BLD AUTO: 0.8 % (ref 0–1.5)
BUN SERPL-MCNC: 29 MG/DL (ref 6–20)
BUN/CREAT SERPL: 8.2 (ref 7–25)
CALCIUM SPEC-SCNC: 7.9 MG/DL (ref 8.6–10.5)
CHLORIDE SERPL-SCNC: 94 MMOL/L (ref 98–107)
CO2 SERPL-SCNC: 23.2 MMOL/L (ref 22–29)
CREAT SERPL-MCNC: 3.54 MG/DL (ref 0.57–1)
DEPRECATED RDW RBC AUTO: 53.6 FL (ref 37–54)
EGFRCR SERPLBLD CKD-EPI 2021: 14.5 ML/MIN/1.73
EOSINOPHIL # BLD AUTO: 0.51 10*3/MM3 (ref 0–0.4)
EOSINOPHIL NFR BLD AUTO: 4.2 % (ref 0.3–6.2)
ERYTHROCYTE [DISTWIDTH] IN BLOOD BY AUTOMATED COUNT: 17.7 % (ref 12.3–15.4)
GLUCOSE BLDC GLUCOMTR-MCNC: 187 MG/DL (ref 70–130)
GLUCOSE BLDC GLUCOMTR-MCNC: 209 MG/DL (ref 70–130)
GLUCOSE SERPL-MCNC: 215 MG/DL (ref 65–99)
HBV CORE AB SERPL QL IA: NEGATIVE
HBV CORE IGM SERPL QL IA: NEGATIVE
HBV DNA SERPL NAA+PROBE-ACNC: NORMAL IU/ML
HBV DNA SERPL NAA+PROBE-LOG IU: NORMAL {LOG_IU}/ML
HBV E AB SERPL QL IA: NEGATIVE
HBV E AG SERPL QL IA: NEGATIVE
HBV SURFACE AB SER QL: NON REACTIVE
HBV SURFACE AG SERPL QL IA: NEGATIVE
HCT VFR BLD AUTO: 30.4 % (ref 34–46.6)
HGB BLD-MCNC: 10 G/DL (ref 12–15.9)
IMM GRANULOCYTES # BLD AUTO: 0.13 10*3/MM3 (ref 0–0.05)
IMM GRANULOCYTES NFR BLD AUTO: 1.1 % (ref 0–0.5)
LYMPHOCYTES # BLD AUTO: 2.46 10*3/MM3 (ref 0.7–3.1)
LYMPHOCYTES NFR BLD AUTO: 20.3 % (ref 19.6–45.3)
MCH RBC QN AUTO: 27.9 PG (ref 26.6–33)
MCHC RBC AUTO-ENTMCNC: 32.9 G/DL (ref 31.5–35.7)
MCV RBC AUTO: 84.9 FL (ref 79–97)
MONOCYTES # BLD AUTO: 1.18 10*3/MM3 (ref 0.1–0.9)
MONOCYTES NFR BLD AUTO: 9.8 % (ref 5–12)
NEUTROPHILS NFR BLD AUTO: 63.8 % (ref 42.7–76)
NEUTROPHILS NFR BLD AUTO: 7.71 10*3/MM3 (ref 1.7–7)
NRBC BLD AUTO-RTO: 0 /100 WBC (ref 0–0.2)
PLATELET # BLD AUTO: 278 10*3/MM3 (ref 140–450)
PMV BLD AUTO: 11.4 FL (ref 6–12)
POTASSIUM SERPL-SCNC: 4.2 MMOL/L (ref 3.5–5.2)
RBC # BLD AUTO: 3.58 10*6/MM3 (ref 3.77–5.28)
REF LAB TEST REF RANGE: NORMAL
SODIUM SERPL-SCNC: 130 MMOL/L (ref 136–145)
WBC NRBC COR # BLD: 12.09 10*3/MM3 (ref 3.4–10.8)

## 2022-03-02 PROCEDURE — 25010000002 HEPARIN (PORCINE) PER 1000 UNITS: Performed by: INTERNAL MEDICINE

## 2022-03-02 PROCEDURE — 97110 THERAPEUTIC EXERCISES: CPT

## 2022-03-02 PROCEDURE — 63710000001 INSULIN LISPRO (HUMAN) PER 5 UNITS: Performed by: HOSPITALIST

## 2022-03-02 PROCEDURE — 82962 GLUCOSE BLOOD TEST: CPT

## 2022-03-02 PROCEDURE — 80048 BASIC METABOLIC PNL TOTAL CA: CPT | Performed by: INTERNAL MEDICINE

## 2022-03-02 PROCEDURE — 85025 COMPLETE CBC W/AUTO DIFF WBC: CPT | Performed by: INTERNAL MEDICINE

## 2022-03-02 RX ORDER — HYDRALAZINE HYDROCHLORIDE 100 MG/1
100 TABLET, FILM COATED ORAL 3 TIMES DAILY
Qty: 90 TABLET | Refills: 0 | Status: SHIPPED | OUTPATIENT
Start: 2022-03-02 | End: 2022-07-22 | Stop reason: HOSPADM

## 2022-03-02 RX ADMIN — FOLIC ACID 1 MG: 1 TABLET ORAL at 08:26

## 2022-03-02 RX ADMIN — SERTRALINE 100 MG: 100 TABLET, FILM COATED ORAL at 08:26

## 2022-03-02 RX ADMIN — HYDROCODONE BITARTRATE AND ACETAMINOPHEN 1 TABLET: 5; 325 TABLET ORAL at 08:35

## 2022-03-02 RX ADMIN — TAMSULOSIN HYDROCHLORIDE 0.4 MG: 0.4 CAPSULE ORAL at 08:26

## 2022-03-02 RX ADMIN — AMLODIPINE BESYLATE 10 MG: 10 TABLET ORAL at 08:26

## 2022-03-02 RX ADMIN — Medication 5000 UNITS: at 08:25

## 2022-03-02 RX ADMIN — INSULIN LISPRO 3 UNITS: 100 INJECTION, SOLUTION INTRAVENOUS; SUBCUTANEOUS at 08:26

## 2022-03-02 RX ADMIN — OLANZAPINE 5 MG: 5 TABLET ORAL at 08:26

## 2022-03-02 RX ADMIN — ASPIRIN 81 MG: 81 TABLET, COATED ORAL at 08:25

## 2022-03-02 RX ADMIN — FUROSEMIDE 40 MG: 40 TABLET ORAL at 08:26

## 2022-03-02 RX ADMIN — METOPROLOL TARTRATE 25 MG: 25 TABLET, FILM COATED ORAL at 08:26

## 2022-03-02 RX ADMIN — LEVOTHYROXINE SODIUM 125 MCG: 0.12 TABLET ORAL at 06:37

## 2022-03-02 RX ADMIN — INSULIN LISPRO 2 UNITS: 100 INJECTION, SOLUTION INTRAVENOUS; SUBCUTANEOUS at 11:37

## 2022-03-02 RX ADMIN — HYDRALAZINE HYDROCHLORIDE 100 MG: 50 TABLET, FILM COATED ORAL at 08:26

## 2022-03-02 RX ADMIN — HEPARIN SODIUM 4000 UNITS: 1000 INJECTION INTRAVENOUS; SUBCUTANEOUS at 16:17

## 2022-03-02 RX ADMIN — LOSARTAN POTASSIUM 50 MG: 50 TABLET, FILM COATED ORAL at 08:26

## 2022-03-02 RX ADMIN — PANTOPRAZOLE SODIUM 40 MG: 40 TABLET, DELAYED RELEASE ORAL at 06:37

## 2022-03-02 NOTE — PROGRESS NOTES
[facility] Progress Note      Patient Name: Zaina Martinez  : 1965  MRN: [id number]  Primary Care Physician:  Karson Oreilly MD  Date of admission: 2022    Subjective     Interval History:   Follow up ESRD. Completed dialysis.   Discussed her Hep B surface Antigen positivity and likely due to her hep B vaccine  given at dialysis unit .   Ready to go home.     Review of Systems:   As noted above    Objective     Vitals:   Temp:  [96.9 °F (36.1 °C)-98.2 °F (36.8 °C)] 96.9 °F (36.1 °C)  Heart Rate:  [55-65] 60  Resp:  [16-18] 16  BP: (115-158)/(62-86) 155/86    Intake/Output Summary (Last 24 hours) at 3/2/2022 1549  Last data filed at 3/2/2022 1300  Gross per 24 hour   Intake 960 ml   Output --   Net 960 ml       Physical Exam:    General Appearance: alert, oriented x 3, no acute distress   Skin: warm and dry  HEENT: oral mucosa normal, nonicteric sclera  Neck: supple, no JVD  Lungs: Clear to auscultation.   Heart: RRR, no s3 or rub  Abdomen: soft, nontender, non-distended. + bs  Extremities: no edema lower ext.   Neuro: normal speech and mental status     Scheduled Meds:     amLODIPine, 10 mg, Oral, Daily  aspirin, 81 mg, Oral, Daily  cholecalciferol, 5,000 Units, Oral, Daily  folic acid, 1 mg, Oral, Daily  furosemide, 40 mg, Oral, BID  hydrALAZINE, 100 mg, Oral, TID  insulin lispro, 0-7 Units, Subcutaneous, TID AC  levothyroxine, 125 mcg, Oral, Q AM  losartan, 50 mg, Oral, Q24H  metoprolol tartrate, 25 mg, Oral, BID  OLANZapine, 5 mg, Oral, BID  pantoprazole, 40 mg, Oral, Q AM  rOPINIRole, 0.75 mg, Oral, Nightly  sertraline, 100 mg, Oral, Daily  tamsulosin, 0.4 mg, Oral, Daily      IV Meds:        Results Reviewed:   I have personally reviewed the results from the time of this admission to 3/2/2022 15:49 EST     Results from last 7 days   Lab Units 22  0530 22  0545 22  0708 22  0519 22  1604   SODIUM mmol/L 130* 129* 133*   < > 133*    POTASSIUM mmol/L 4.2 3.8 4.5   < > 4.8   CHLORIDE mmol/L 94* 93* 99   < > 91*   CO2 mmol/L 23.2 25.0 20.7*   < > 25.0   BUN mg/dL 29* 19 25*   < > 21*   CREATININE mg/dL 3.54* 2.36* 3.39*   < > 3.45*   CALCIUM mg/dL 7.9* 7.7* 8.1*   < > 8.3*   BILIRUBIN mg/dL  --  0.9  --   --  1.5*   ALK PHOS U/L  --  715*  --   --  1,330*   ALT (SGPT) U/L  --  11  --   --  17   AST (SGOT) U/L  --  26  --   --  61*   GLUCOSE mg/dL 215* 217* 211*   < > 126*    < > = values in this interval not displayed.       Estimated Creatinine Clearance: 15.6 mL/min (A) (by C-G formula based on SCr of 3.54 mg/dL (H)).    Results from last 7 days   Lab Units 02/28/22  0708 02/27/22  0453 02/25/22  0531   PHOSPHORUS mg/dL 3.9 2.4* 4.6*       Results from last 7 days   Lab Units 02/24/22  1257   URIC ACID mg/dL 6.2*       Results from last 7 days   Lab Units 03/02/22  0530 03/01/22  0545 02/28/22  0708 02/27/22  0453 02/26/22  0722   WBC 10*3/mm3 12.09* 10.73 11.55* 10.13 11.39*   HEMOGLOBIN g/dL 10.0* 10.1* 10.3* 9.9* 9.3*   PLATELETS 10*3/mm3 278 236 289 247 240       Results from last 7 days   Lab Units 03/01/22  1124   INR  0.99       Assessment / Plan     ASSESSMENT:  1. ESRD.  HD MWF at [facility] dialysis unit.   Dialysis today.   2. Abdominal pain. Sp cholecycystectomy   SP fluid collection aspiration.   Improved.   3. Anemia CKD.  Procrit with HD.  4. Encephalopathy improved.   5. HTN better controlled. Losartan started 2/28.  6. DM2  7. Hypothyroid on replacement.   8. Hep B surface AG positive. Core IgM negative. ID eval noted.  Spoke to intake person at [facility].  Patient recently received Hepatitis B vaccine . Feb 18. I spoke to Dr. Fontanez with this information.  He will addend his DC summary to reflect this .  PLAN:  1. Ok for dc after dialysis.       Ruth Lira MD  03/02/22  15:49 EST    [facility]  [phone]

## 2022-03-02 NOTE — DISCHARGE SUMMARY
Patient Name: Zaina Martinez  : 1965  MRN: [id number]    Date of Admission: 2022  Date of Discharge:  3/2/2022  Primary Care Physician: Karson Oreilly MD      Chief Complaint:   Altered Mental Status      Discharge Diagnoses     Active Hospital Problems    Diagnosis  POA   • **Metabolic encephalopathy [G93.41]  Yes   • Abnormal findings on diagnostic imaging of abdomen [R93.5]  Yes   • Status post cholecystectomy [Z90.49]  Not Applicable   • Hyponatremia [E87.1]  Yes   • Leukocytosis [D72.829]  Yes   • Anemia due to chronic kidney disease, on chronic dialysis (HCC) [N18.6, D63.1, Z99.2]  Not Applicable   • Chronic diastolic CHF (congestive heart failure) (HCC) [I50.32]  Yes   • ESRD (end stage renal disease) (HCC) [N18.6]  Yes   • CAD (coronary artery disease) [I25.10]  Yes   • Medically noncompliant [Z91.19]  Not Applicable   • Acute DM type 2 causing complication (HCC) [E11.8]  Yes   • Anemia [D64.9]  Yes      Resolved Hospital Problems   No resolved problems to display.        Hospital Course   56 year old female who was taken to the ED with altered mental status; per family she has been confused for several days; she has a history of esrd and is on dialysis; she cannot tell me why she is here or who her nephrologist is; she had a cholecystectomy a few weeks ago.       1. Acute metabolic encephalopathy, etiology is uncertain at this point and mental status is now close to baseline.  CT brain on 2022 did not reveal any acute findings.     2. Underwent cholecystectomy few weeks ago and now has intra-abdominal fluid collection, underwent CT-guided aspiration  Revealed blood and cultures did not reveal any growth and therefore antibiotics have been discontinued.  Surgery also did evaluate and signed off.     3. History of coronary artery disease, on aspirin, metoprolol and will consider statins upon discharge.       4. End-stage renal disease, continue with routine dialysis.       5.  Hypertension, on amlodipine, hydralazine, metoprolol who which will be continued.     6. Diabetes mellitus, on corrective dose insulin and monitor blood blood sugars closely.     7. Hypothyroidism, on Synthroid.       8. GERD, acid suppressive therapy.     9.  Elevated BNP, currently no evidence of any heart failure clinically.  Echo done in November 2021 revealed normal ejection fraction.     10.  Hepatitis-B surface antigen positive,   Please note patient did receive hepatitis-B vaccine on February 18th and it was felt that her positive antigen as most likely due to the vaccine and more so than true infection.  Hepatitis-B core IgM is negative.  The same was discussed with Dr. Lira.  Infectious disease did evaluate and will follow as an outpatient basis.         Day of Discharge         Physical Exam:  Temp:  [96.9 °F (36.1 °C)-98.2 °F (36.8 °C)] 96.9 °F (36.1 °C)  Heart Rate:  [55-65] 60  Resp:  [16-18] 16  BP: (115-158)/(62-86) 155/86  Body mass index is 25.16 kg/m².  Physical Exam  HEENT:  Atraumatic, normocephalic.  PERRLA.  Extraocular movements intact.  Conjunctivae pink.  Sclerae, no icterus.  Mucous membranes dry.   NECK:  Supple.  No JVD.  HEART:  Regular rate and rhythm.  Normal S1, S2.  LUNGS:  Fairly clear to auscultation anteriorly.  No wheezes.  No crackles.  ABDOMEN:   Soft, nontender.  Bowel sounds present.  No rebound.  No  guarding.  EXTREMITIES:  No cyanosis, clubbing, or edema.  Palpable pedal pulses.  NEURO:  Grossly nonfocal.  No facial asymmetry.  Good strength in all 4  extremities.  SKIN:  Warm and dry.  No evidence of rashes.  Dialysis catheter noted on the right chest.  LYMPH NODES:  No palpable cervical or supraclavicular lymphadenopathy.         Consultants     Consult Orders (all) (From admission, onward)     Start     Ordered    03/01/22 0855  Inpatient Infectious Diseases Consult  Once        Specialty:  Infectious Diseases  Provider:  Andrez Christianson MD    03/01/22  0855    02/24/22 1145  Inpatient Nephrology Consult  Once,   Status:  Canceled        Specialty:  Nephrology  Provider:  Rnady Aguilar MD    02/24/22 1144    02/24/22 1048  Inpatient Nephrology Consult  Once        Specialty:  Nephrology  Provider:  Farida Kaye MD    02/24/22 1049    02/23/22 1737  [facility] (on-call MD unless specified) Details  Once,   Status:  Canceled        Specialty:  Hospitalist  Provider:  (Not yet assigned)    02/23/22 1736    02/23/22 1737  Surgery (on-call MD unless specified)  Once        Specialty:  General Surgery  Provider:  (Not yet assigned)    02/23/22 1736              Procedures     * Surgery not found *      Imaging Results (All)     Procedure Component Value Units Date/Time    US Liver [[id number]] Collected: 03/01/22 1819     Updated: 03/01/22 1838    Narrative:      US LIVER-  03/01/2022     HISTORY: Evaluate for possible cirrhosis.     Gallbladder has been removed. There is a small amount of heterogeneous  material in the gallbladder fossa.. The common bile duct is not dilated  measuring 3.7 mm.     The liver appears relatively normal in size and echotexture. No focal  hepatic lesions are seen. Pancreas appears grossly normal.     The right kidney measures 10.2 cm in length and appears normal.     There is an approximately 8.7 cm x 5.6 and meter by 8.2 cm fluid  collection with some minimal internal debris in the right lower quadrant  of the abdomen. This is better seen on the CT scan of the abdomen and  pelvis from 02/23/2022.       Impression:      1. Status post cholecystectomy.  2. Small amount of heterogeneous material in the gallbladder fossa could  represent an inflammatory phlegmon and/or hemorrhage.  3. Fluid collection the right lower quadrant. Please see additional  dictation for the CT scan from 02/23/2022.  4. Short-term follow-up CT of the abdomen and pelvis may be helpful.  5. Normal-appearing liver. There is no evidence of cirrhosis or  focal  hepatic lesions.     This report was finalized on 3/1/2022 6:35 PM by Dr. Ran Wall M.D.       CT Guided Biopsy Aspiration Injection [[id number]] Collected: 02/25/22 1650     Updated: 02/25/22 1744    Narrative:      CT GUIDED ASPIRATION     HISTORY:  drain post op fluid collection     COMPARISON: CT 02/23/2022.     PROCEDURE DETAILS: After informed consent was obtained from the patient,  the patient was placed on the CT scanner in supine position. A nurse was  continuously present and moderate sedation was performed under physician  supervision from 1119 to 1136 hours with a total of 50 mcg fentanyl and  1 mg versed administered. A preliminary scan of the abdomen was obtained  and the abdominal fluid collection identified.  A route was planned for  the aspiration/drainage and an appropriate skin site identified. This  site was then prepped and draped in the usual sterile manner and the  skin anesthetized with lidocaine. The tract to the collection was  anesthetized with lidocaine and a Yueh needle catheter placed within the  collection. Approximately 15 mL sanguinous fluid could be aspirated and  a specimen was submitted to lab. As aspirate and imaging implied the  presence of the hematoma without evident infection no drain was placed.  The patient tolerated the procedure well and there were no immediate  complications.  All elements of maximal sterile technique were followed.  Radiation dose reduction techniques were utilized, including automated  exposure control and exposure modulation        Impression:      Successful aspiration of hemorrhagic fluid.     This report was finalized on 2/25/2022 5:41 PM by Dr. Olvin Balderas M.D.       CT Abdomen Pelvis Without Contrast [[id number]] Collected: 02/23/22 1720     Updated: 02/23/22 1730    Narrative:      CT ABDOMEN AND PELVIS WITHOUT IV CONTRAST     HISTORY: Abdominal pain     TECHNIQUE: Radiation dose reduction techniques were utilized,  including  automated exposure control and exposure modulation based on body size.  Axial images were obtained through the abdomen and pelvis without the  administration of IV contrast. Coronal and sagittal reformatted images  were obtained.     COMPARISON: 01/09/2022     FINDINGS:      ABDOMEN:  There is a minimal left-sided pleural effusion. There is associated mild  left lower lobe passive atelectasis. Small pericardial effusion. The  liver appears unremarkable. There has been interval cholecystectomy.  There is a 7.8 cm rounded collection with areas of high attenuation  located in the right side of the abdomen below the liver that is new.  This collection does not contain any air. The spleen is unremarkable.  The kidneys, adrenal glands, and pancreas are unremarkable. Anasarca.     PELVIS:  Bladder wall is diffusely thickened, nonspecific. There appears to have  been a hysterectomy. Colon appears unremarkable. Appendix normal.  Anasarca. No acute bony abnormality.       Impression:      1. There has been interval cholecystectomy. There is a 7.8 cm rounded  collection with areas of high attenuation located in the right side of  the abdomen below the liver, new since the previous study. Collection is  indeterminate. It may reflect a postoperative hematoma or biloma. There  are no gas bubbles to specifically suggest an abscess although this  should also be considered.  2. Additional findings as described     Radiation dose reduction techniques were utilized, including automated  exposure control and exposure modulation based on body size.     This report was finalized on 2/23/2022 5:27 PM by Dr. Bijan Ordoñez M.D.       CT Head Without Contrast [[id number]] Collected: 02/23/22 1717     Updated: 02/23/22 1726    Narrative:      CT HEAD WO CONTRAST-     INDICATIONS: Altered mental status     TECHNIQUE: Radiation dose reduction techniques were utilized, including  automated exposure control and exposure modulation  based on body size.  Noncontrast head CT     COMPARISON: None available     FINDINGS:     No acute intracranial hemorrhage, midline shift or mass effect. No acute  territorial infarct is identified. Bilateral basal ganglia  mineralization is present.     Mild periventricular hypodensities suggest chronic small vessel ischemic  change in a patient this age.        Ventricles, cisterns, cerebral sulci are unremarkable for patient age.     The visualized paranasal sinuses, orbits, mastoid air cells are  unremarkable. Ring of C1 is developmentally incomplete. Left maxillary  molar dental caries are apparent, correlate clinically.             Impression:         No acute intracranial hemorrhage or hydrocephalus. If there is further  clinical concern, MRI could be considered for further evaluation.     This report was finalized on 2/23/2022 5:23 PM by Dr. Jared Kern M.D.       XR Chest 1 View [[id number]] Collected: 02/23/22 1549     Updated: 02/23/22 1553    Narrative:      XR CHEST 1 VW-     HISTORY: Female who is 56 years-old,  confusion     TECHNIQUE: Frontal view of the chest     COMPARISON: 12/19/2021     FINDINGS: Stable appearing right-sided central venous catheter. The  heart size is borderline. Pulmonary vasculature is unremarkable. No  focal pulmonary consolidation, pleural effusion, or pneumothorax. No  acute osseous process.       Impression:      No evidence for acute pulmonary process. Follow-up as  clinical indications persist.     This report was finalized on 2/23/2022 3:50 PM by Dr. Jared Kern M.D.           Results for orders placed during the hospital encounter of 11/29/21    Duplex Initial Vein Mapping Hemodialysis Access Bilateral CAR    Interpretation Summary  · On the right, the patient has acute superficial thrombophlebitis of the cephalic vein in the distal upper arm and antecubital fossa. Basilic vein is inadequate.  · On the left, the patient has acute superficial  thrombophlebitis in the cephalic vein at the antecubital fossa. Basilic vein is inadequate.  · Arterial anatomy on the right is abnormal with a high bifurcation of the brachial in the mid upper arm.    Results for orders placed during the hospital encounter of 11/29/21    Adult Transthoracic Echo Complete W/ Cont if Necessary Per Protocol    Interpretation Summary  · Calculated left ventricular EF = 61.8% Estimated left ventricular EF was in agreement with the calculated left ventricular EF. Left ventricular systolic function is normal.  · Left ventricular diastolic function is consistent with (grade II w/high LAP) pseudonormalization.  · The left atrial cavity is mildly dilated.  · Trace tricuspid valve regurgitation is present. Calculated right ventricular systolic pressure from tricuspid regurgitation is 21.4 mmHg.    Pertinent Labs     Results from last 7 days   Lab Units 03/02/22  0530 03/01/22  0545 02/28/22  0708 02/27/22  0453   WBC 10*3/mm3 12.09* 10.73 11.55* 10.13   HEMOGLOBIN g/dL 10.0* 10.1* 10.3* 9.9*   PLATELETS 10*3/mm3 278 236 289 247     Results from last 7 days   Lab Units 03/02/22  0530 03/01/22  0545 02/28/22  0708 02/27/22  0453 02/26/22  0722 02/26/22  0722 02/25/22  0531 02/25/22  0531   SODIUM mmol/L 130* 129* 133* 130*   < > 132*   < > 135*   POTASSIUM mmol/L 4.2 3.8 4.5 4.0   < > 4.2   < > 4.4   CHLORIDE mmol/L 94* 93* 99 99   < > 98   < > 97*   CO2 mmol/L 23.2 25.0 20.7* 22.0   < > 25.0   < > 26.1   BUN mg/dL 29* 19 25* 11   < > 19   < > 34*   CREATININE mg/dL 3.54* 2.36* 3.39* 1.94*   < > 3.13*   < > 4.45*   GLUCOSE mg/dL 215* 217* 211* 199*   < > 237*   < > 55*   EGFR IF NONAFRICN AM mL/min/1.73  --   --  14* 27*  --  15*  --  10*    < > = values in this interval not displayed.     Results from last 7 days   Lab Units 03/01/22  0545 02/28/22  0708 02/27/22  0453 02/25/22  0531   ALBUMIN g/dL 2.30* 2.40* 2.70* 2.20*   BILIRUBIN mg/dL 0.9  --   --   --    ALK PHOS U/L 715*  --   --   --     AST (SGOT) U/L 26  --   --   --    ALT (SGPT) U/L 11  --   --   --      Results from last 7 days   Lab Units 03/02/22  0530 03/01/22  0545 02/28/22  0708 02/27/22  0453 02/26/22  0722 02/25/22  0531 02/24/22  1257 02/24/22  1257   CALCIUM mg/dL 7.9* 7.7* 8.1* 7.8*   < > 7.6*   < > 7.7*   ALBUMIN g/dL  --  2.30* 2.40* 2.70*  --  2.20*   < > 2.50*   PHOSPHORUS mg/dL  --   --  3.9 2.4*  --  4.6*  --  3.5    < > = values in this interval not displayed.       Results from last 7 days   Lab Units 02/24/22  1257   PROBNP pg/mL 28,163.0*     Results from last 7 days   Lab Units 02/24/22  1257   URIC ACID mg/dL 6.2*         Invalid input(s): LDLCALC  Results from last 7 days   Lab Units 02/25/22  1134 02/23/22  2240   BLOODCX   --  No growth at 5 days  No growth at 5 days   BODYFLDCX  No growth at 3 days  --      Results from last 7 days   Lab Units 02/23/22  1855   COVID19  Not Detected       Test Results Pending at Discharge     Pending Labs     Order Current Status    HCV FibroSURE In process          Discharge Details        Discharge Medications      Changes to Medications      Instructions Start Date   hydrALAZINE 100 MG tablet  Commonly known as: APRESOLINE  What changed:   medication strength  how much to take   100 mg, Oral, 3 Times Daily         Continue These Medications      Instructions Start Date   acetaminophen 325 MG tablet  Commonly known as: TYLENOL   650 mg, Oral, Every 4 Hours PRN      amLODIPine 5 MG tablet  Commonly known as: NORVASC   10 mg, Oral, Daily      aspirin 81 MG EC tablet   81 mg, Oral, Daily      folic acid 1 MG tablet  Commonly known as: FOLVITE   1 mg, Oral, Daily      furosemide 40 MG tablet  Commonly known as: LASIX   40 mg, Oral, 2 Times Daily      HYDROcodone-acetaminophen 5-325 MG per tablet  Commonly known as: NORCO   1 tablet, Oral, Every 6 Hours PRN      insulin glargine 100 UNIT/ML injection  Commonly known as: LANTUS, SEMGLEE   10 Units, Subcutaneous, Daily      insulin lispro  100 UNIT/ML injection  Commonly known as: humaLOG   3 Units, Subcutaneous, 3 Times Daily Before Meals      levothyroxine 125 MCG tablet  Commonly known as: SYNTHROID, LEVOTHROID   125 mcg, Oral, Daily      metoprolol tartrate 25 MG tablet  Commonly known as: LOPRESSOR   25 mg, Oral, 2 Times Daily      multivitamin with minerals tablet tablet   1 tablet, Oral, Daily      OLANZapine 5 MG tablet  Commonly known as: zyPREXA   5 mg, Oral, 2 Times Daily      pantoprazole 40 MG EC tablet  Commonly known as: PROTONIX   40 mg, Oral, Daily      rOPINIRole 0.25 MG tablet  Commonly known as: REQUIP   0.75 mg, Oral, Every Night at Bedtime      sertraline 100 MG tablet  Commonly known as: ZOLOFT   100 mg, Oral, Daily      tamsulosin 0.4 MG capsule 24 hr capsule  Commonly known as: FLOMAX   1 capsule, Oral, Daily      vitamin D3 125 MCG (5000 UT) capsule capsule   2,000 Units, Oral, Daily             Allergies   Allergen Reactions   • Ibuprofen Other (See Comments)     Kidney disease   • Prochlorperazine Other (See Comments)     Dystonic reaction            Discharge Disposition:  Home or Self Care      Discharge Diet:  Diet Order   Procedures   • Diet Regular; Consistent Carbohydrate       Discharge Activity:   Activity Instructions     Activity as Tolerated            CODE STATUS:    Code Status and Medical Interventions:   Ordered at: 02/23/22 1901     Code Status (Patient has no pulse and is not breathing):    CPR (Attempt to Resuscitate)     Medical Interventions (Patient has pulse or is breathing):    Full       Future Appointments   Date Time Provider Department Center   3/16/2022  9:30 AM John Tovar MD [facility]     Additional Instructions for the Follow-ups that You Need to Schedule     Discharge Follow-up with PCP   As directed       Currently Documented PCP:    Karson Oreilly MD    PCP Phone Number:    [phone]     Follow Up Details: 2 weeks         Discharge Follow-up with Specified  Provider: Dr. Rolando Canas; 2 Weeks   As directed      To: Dr. Rolando Canas    Follow Up: 2 Weeks            Contact information for follow-up providers     Karson Oreilly MD Follow up.    Specialty: Family Medicine  Why: Appoinment Wednesday March 9th 2:00pm  Contact information:  [address]  [address]  [facility] [address]  [phone]                   Contact information for after-discharge care     Dialysis/Infusion     [facility] .    Service: Dialysis  Contact information:  [address] [facility] [address] [phone]                 [facility]    Service: [facility]  Contact information:  [address]  [facility] [address]  [phone]                             Additional Instructions for the Follow-ups that You Need to Schedule     Discharge Follow-up with PCP   As directed       Currently Documented PCP:    Karson Oreilly MD    PCP Phone Number:    [phone]     Follow Up Details: 2 weeks         Discharge Follow-up with Specified Provider: Dr. Rolando Canas; 2 Weeks   As directed      To: Dr. Rolando Canas    Follow Up: 2 Weeks           Time Spent on Discharge:  Greater than 30 minutes      Chao Fontanez MD  [facility]  03/02/22  17:44 EST

## 2022-03-02 NOTE — CASE MANAGEMENT/SOCIAL WORK
Continued Stay Note  [facility]     Patient Name: Zaina Martinez  MRN: [id number]  Today's Date: 3/2/2022    Admit Date: 2/23/2022     Discharge Plan     Row Name 03/02/22 1058       Plan    Plan Comments DC orders noted.  [facility] notified and they will followup w/ pt after DC.  Pt to be DC'd after HD today.    Final Note DC home with spouse and will continue with [facility]......Liset BARNES/ NATALY               Discharge Codes    No documentation.               Expected Discharge Date and Time     Expected Discharge Date Expected Discharge Time    Mar 2, 2022             Liset Allison RN

## 2022-03-02 NOTE — PLAN OF CARE
Goal Outcome Evaluation:  Plan of Care Reviewed With: patient        Progress: improving  Outcome Summary: Pt tolerated treatment well this date. Increased gait distance to 120ft w/ Rw and CGA. Limited overall d/t fatigue, though good balance noted today. Encouraged pt to continue LE exercises on own, and to ambulate a few times a day.

## 2022-03-02 NOTE — THERAPY TREATMENT NOTE
Patient Name: Zaina Martinez  : 1965    MRN: [id number]                              Today's Date: 3/2/2022       Admit Date: 2022    Visit Dx:     ICD-10-CM ICD-9-CM   1. Altered mental status, unspecified altered mental status type  R41.82 780.97   2. ESRD (end stage renal disease) ([facility])  N18.6 585.6   3. Abnormal abdominal CT scan  R93.5 793.6     Patient Active Problem List   Diagnosis   • Renal insufficiency   • Hypertensive disorder   • Hypothyroidism   • Acute DM type 2 causing complication ([facility])   • Rheumatoid arthritis ([facility])   • Angioedema   • Esophageal dysmotility   • Anemia   • Medically noncompliant   • Myocardial infarction due to demand ischemia ([facility])   • Enteritis   • PRES (posterior reversible encephalopathy syndrome)   • Urine retention   • Klebsiella infection   • Superficial thrombophlebitis   • Generalized weakness   • ESRD (end stage renal disease) ([facility])   • CAD (coronary artery disease)   • Abnormal urinalysis   • Chronic diastolic CHF (congestive heart failure) ([facility])   • Pyelonephritis   • Calculus of gallbladder with acute on chronic cholecystitis without obstruction   • Pleural effusion on right   • Anemia due to chronic kidney disease, on chronic dialysis ([facility])   • Abnormal findings on diagnostic imaging of other specified body structures   • Acute upper respiratory infection   • Agitation   • Alkaline phosphatase raised   • Casts present in urine   • Cellulitis of toe   • Hip pain   • Community acquired pneumonia   • Depressive disorder   • Diarrhea of presumed infectious origin   • Difficult or painful urination   • Disease due to severe acute respiratory syndrome coronavirus 2 (SARS-CoV-2)   • Dyspnea   • Encounter for follow-up examination after completed treatment for conditions other than malignant neoplasm   • H/O: hypothyroidism   • Hyperlipidemia   • Hypomagnesemia   • Intractable vomiting with nausea   • Leukocytosis   • Luetscher's syndrome   • Need for influenza  vaccination   • Restless legs   • Noncompliance with treatment   • Shoulder pain   • Urinary tract infectious disease   • Metabolic encephalopathy   • Abnormal findings on diagnostic imaging of abdomen   • Status post cholecystectomy   • Hyponatremia   • Leukocytosis     Past Medical History:   Diagnosis Date   • Acute on chronic diastolic CHF (congestive heart failure) (HCC)    • CAD (coronary artery disease) 12/20/2021   • Diabetes (HCC)    • Disease of thyroid gland    • GERD (gastroesophageal reflux disease)    • Hyperlipidemia 11/30/2018   • Hypertension    • Rheumatoid arthritis (HCC)      Past Surgical History:   Procedure Laterality Date   • CHOLECYSTECTOMY WITH INTRAOPERATIVE CHOLANGIOGRAM N/A 1/10/2022    Procedure: Laparoscopic cholecystectomy with intraoperative cholangiogram;  Surgeon: Ramana Raygoza MD;  Location: [facility];  Service: General;  Laterality: N/A;   • EYE SURGERY     • HYSTERECTOMY     • INSERTION HEMODIALYSIS CATHETER N/A 12/6/2021    Procedure: HEMODIALYSIS CATHETER INSERTION;  Surgeon: Keli Salazar MD;  Location: [facility] 18/19;  Service: Vascular;  Laterality: N/A;      General Information     Row Name 03/02/22 1100          Physical Therapy Time and Intention    Document Type therapy note (daily note)  -     Mode of Treatment physical therapy  -     Row Name 03/02/22 1100          General Information    Existing Precautions/Restrictions fall  -     Row Name 03/02/22 1100          Cognition    Orientation Status (Cognition) oriented x 4  -           User Key  (r) = Recorded By, (t) = Taken By, (c) = Cosigned By    Initials Name Provider Type     Tesha Baires PTA Physical Therapy Assistant               Mobility     Row Name 03/02/22 1101          Bed Mobility    Bed Mobility supine-sit  -     Supine-Sit [place] (Bed Mobility) standby assist  -     Assistive Device (Bed Mobility) bed rails; head of bed elevated  -     Row Name  03/02/22 1101          Sit-Stand Transfer    Sit-Stand [place] (Transfers) standby assist  -     Assistive Device (Sit-Stand Transfers) walker, front-wheeled  -     Row Name 03/02/22 1101          Gait/Stairs (Locomotion)    [place] Level (Gait) contact guard  -     Assistive Device (Gait) walker, front-wheeled  -SM     Distance in Feet (Gait) 120  -SM     Deviations/Abnormal Patterns (Gait) kenn decreased; stride length decreased  -     Bilateral Gait Deviations forward flexed posture  -           User Key  (r) = Recorded By, (t) = Taken By, (c) = Cosigned By    Initials Name Provider Type    Tesha Zuniga PTA Physical Therapy Assistant               Obj/Interventions     Row Name 03/02/22 1102          Motor Skills    Therapeutic Exercise --  reviewed seated LE exercises  -           User Key  (r) = Recorded By, (t) = Taken By, (c) = Cosigned By    Initials Name Provider Type    Tesha Zuniga PTA Physical Therapy Assistant               Goals/Plan    No documentation.                Clinical Impression     Row Name 03/02/22 1103          Pain    Additional Documentation Pain Scale: Numbers Pre/Post-Treatment (Group)  -[facility] Name 03/02/22 1103          Pain Scale: Numbers Pre/Post-Treatment    Pretreatment Pain Rating 2/10  -     Posttreatment Pain Rating 2/10  -     Pain Location - Side Right  -SM     Pain Location abdomen  -     Pain Intervention(s) Repositioned; Ambulation/increased activity; Rest  -     Row Name 03/02/22 1103          Positioning and Restraints    Pre-Treatment Position in bed  -     Post Treatment Position chair  -     In Chair reclined; call light within reach; encouraged to call for assist; exit alarm on  -           User Key  (r) = Recorded By, (t) = Taken By, (c) = Cosigned By    Initials Name Provider Type    Tesha Zuniga PTA Physical Therapy Assistant               Outcome Measures     Row Name 03/02/22 1103           How much help from another person do you currently need...    Turning from your back to your side while in flat bed without using bedrails? 4  -SM     Moving from lying on back to sitting on the side of a flat bed without bedrails? 4  -SM     Moving to and from a bed to a chair (including a wheelchair)? 4  -SM     Standing up from a chair using your arms (e.g., wheelchair, bedside chair)? 4  -SM     Climbing 3-5 steps with a railing? 3  -SM     To walk in hospital room? 3  -SM     AM-PAC 6 Clicks Score (PT) 22  -     Row Name 03/02/22 1103          Functional Assessment    Outcome Measure Options AM-PAC 6 Clicks Basic Mobility (PT)  -           User Key  (r) = Recorded By, (t) = Taken By, (c) = Cosigned By    Initials Name Provider Type     Tesha Baires PTA Physical Therapy Assistant                             Physical Therapy Education                 Title: PT OT SLP Therapies (In Progress)     Topic: Physical Therapy (Done)     Point: Mobility training (Done)     Learning Progress Summary           Patient Acceptance, E,TB,D, VU,NR by  at 3/2/2022 1104    Acceptance, E, VU,NR by  at 2/28/2022 1603                   Point: Home exercise program (Done)     Learning Progress Summary           Patient Acceptance, E,TB,D, VU,NR by  at 3/2/2022 1104    Acceptance, E,TB,D, VU,NR by  at 3/1/2022 1614    Acceptance, E, VU,NR by  at 2/28/2022 1603                   Point: Body mechanics (Done)     Learning Progress Summary           Patient Acceptance, E,TB,D, VU,NR by  at 3/2/2022 1104    Acceptance, E, VU,NR by  at 2/28/2022 1603                   Point: Precautions (Done)     Learning Progress Summary           Patient Acceptance, E,TB,D, VU,NR by  at 3/2/2022 1104    Acceptance, E, VU,NR by PING at 2/28/2022 1603                               User Key     Initials Effective Dates Name Provider Type [facility] 03/07/18 -  Tesha Baires PTA Physical Therapy Assistant PT    PING  10/25/19 -  Corinna Barber, PT Physical Therapist PT              PT Recommendation and Plan     Plan of Care Reviewed With: patient  Progress: improving  Outcome Summary: Pt tolerated treatment well this date. Increased gait distance to 120ft w/ Rw and CGA. Limited overall d/t fatigue, though good balance noted today. Encouraged pt to continue LE exercises on own, and to ambulate a few times a day.     Time Calculation:    PT Charges     Row Name 03/02/22 1106             Time Calculation    Start Time 0940  -      Stop Time 1004  -      Time Calculation (min) 24 min  -      PT Received On 03/02/22  -      PT - Next Appointment 03/03/22  -            User Key  (r) = Recorded By, (t) = Taken By, (c) = Cosigned By    Initials Name Provider Type    Tesha Zuniga PTA Physical Therapy Assistant              Therapy Charges for Today     Code Description Service Date Service Provider Modifiers Qty    63674721593 HC PT THER PROC EA 15 MIN 3/1/2022 Tesha Baires PTA GP 1    84053086724 HC PT THER PROC EA 15 MIN 3/2/2022 Tesha Baires PTA GP 2          PT G-Codes  Outcome Measure Options: AM-PAC 6 Clicks Basic Mobility (PT)  AM-PAC 6 Clicks Score (PT): 22    Tesha Baires PTA  3/2/2022

## 2022-03-03 LAB
A2 MACROGLOB SERPL-MCNC: 223 MG/DL (ref 110–276)
ALT SERPL W P-5'-P-CCNC: 13 IU/L (ref 0–40)
APO A-I SERPL-MCNC: 107 MG/DL (ref 116–209)
BILIRUB SERPL-MCNC: 1.1 MG/DL (ref 0–1.2)
FIBROSIS SCORING:: ABNORMAL
FIBROSIS STAGE SERPL QL: ABNORMAL
GGT SERPL-CCNC: 615 IU/L (ref 0–60)
HAPTOGLOB SERPL-MCNC: 103 MG/DL (ref 33–346)
HCV AB SER QL: ABNORMAL
LABORATORY COMMENT REPORT: ABNORMAL
LIVER FIBR SCORE SERPL CALC.FIBROSURE: 0.8 (ref 0–0.21)
NECROINFLAMM ACTIVITY SCORING:: ABNORMAL
NECROINFLAMMATORY ACT GRADE SERPL QL: ABNORMAL
NECROINFLAMMATORY ACT SCORE SERPL: 0.08 (ref 0–0.17)
SERVICE CMNT-IMP: ABNORMAL

## 2022-03-03 NOTE — CASE MANAGEMENT/SOCIAL WORK
Case Management Discharge Note      Final Note: DC home with spouse and will continue with [facility]......Liset BARNES/ NATALY    Provided Post Acute Provider List?: Refused  Refused Provider List Comment: declined; current with [facility]    Selected Continued Care - Discharged on 3/2/2022 Admission date: 2/23/2022 - Discharge disposition: Home or Self Care    Destination    No services have been selected for the patient.              Durable Medical Equipment    No services have been selected for the patient.              Dialysis/Infusion Coordination complete.    Service Provider Selected Services Address Phone Fax Patient Preferred    [facility]  Dialysis [address] [facility] [address] [phone] --          Home Medical Care     Service Provider Selected Services Address Phone Fax Patient Preferred    [facility]  Home Health Services [address] [place] [id number] [phone] --          Therapy    No services have been selected for the patient.              Community Resources    No services have been selected for the patient.              [facility]    No services have been selected for the patient.                Selected Continued Care - Prior Encounters Includes selections from prior encounters from 11/25/2021 to 3/2/2022    Discharged on 1/15/2022 Admission date: 1/9/2022 - Discharge disposition: Home or Self Care    Dialysis/Infusion     Service Provider Selected Services Address Phone Fax Patient Preferred    [facility]  Dialysis [address] [place] [address] [phone] --          Home Medical Care     Service Provider Selected Services Address Phone Fax Patient Preferred    [facility]  Home Health Services [address] [facility] [address] [id number] [phone] --                Discharged on 12/29/2021 Admission date: 12/19/2021 - Discharge disposition:  Skilled Nursing Facility (DC - External)    Destination     Service Provider Selected Services Address Phone Fax Patient Preferred    [facility]  Skilled Nursing [address] [facility] [address] [phone] --                Discharged on 12/17/2021 Admission date: 11/29/2021 - Discharge disposition: Home or Self Care    Durable Medical Equipment     Service Provider Selected Services Address Phone Fax Patient Preferred    [facility]  Durable Medical Equipment [address] [facility] [address], [place] [id number] [phone] --          Dialysis/Infusion     Service Provider Selected Services Address Phone Fax Patient Preferred    [facility] [facility]  Dialysis [address] [facility] [address] [phone] --                         Final Discharge Disposition Code: 06 - home with home health care ([facility] )

## 2022-03-03 NOTE — OUTREACH NOTE
Prep Survey      Responses   [facility] patient discharged from? [place]   Is LACE score < 7 ? No   Emergency Room discharge w/ pulse ox? No   Eligibility Readm Mgmt   Discharge diagnosis Metabolic encephalopathy    Does the patient have one of the following disease processes/diagnoses(primary or secondary)? Other   Does the patient have Home health ordered? Yes   What is the Home health agency?  [facility]   Is there a DME ordered? No   Comments regarding appointments [employer]    Prep survey completed? Yes          Hazel Vicente RN

## 2022-03-04 ENCOUNTER — READMISSION MANAGEMENT (OUTPATIENT)
Dept: CALL CENTER | Facility: HOSPITAL | Age: 57
End: 2022-03-04

## 2022-03-04 NOTE — OUTREACH NOTE
Medical Week 1 Survey      Responses   [facility] patient discharged from? [place]   Does the patient have one of the following disease processes/diagnoses(primary or secondary)? Other   Week 1 attempt successful? No   Unsuccessful attempts Attempt 1  [patient and spouse]          Hortencia Traore RN

## 2022-03-08 ENCOUNTER — READMISSION MANAGEMENT (OUTPATIENT)
Dept: CALL CENTER | Facility: HOSPITAL | Age: 57
End: 2022-03-08

## 2022-03-08 NOTE — OUTREACH NOTE
Medical Week 1 Survey    Flowsheet Row Responses   [facility] patient discharged from? [place]   Does the patient have one of the following disease processes/diagnoses(primary or secondary)? Other   Week 1 attempt successful? Yes   Call start time 1522   Call end time 1527   Discharge diagnosis Metabolic encephalopathy    Person spoke with today (if not patient) and relationship spouse   Meds reviewed with patient/caregiver? Yes   Is the patient having any side effects they believe may be caused by any medication additions or changes? No   Does the patient have all medications ordered at discharge? Yes   Is the patient taking all medications as directed (includes completed medication regime)? Yes   What is the Home health agency?  [facility]   Has home health visited the patient within 72 hours of discharge? Yes   Home health comments HH visited yesterday   Psychosocial issues? No   Did the patient receive a copy of their discharge instructions? Yes   Nursing interventions Reviewed instructions with patient   What is the patient's perception of their health status since discharge? Improving   If the patient is a current smoker, are they able to teach back resources for cessation? Not a smoker   Week 1 call completed? Yes   Wrap up additional comments Spouse reports patient is weak but has an appetite and is sleeping better.  She is going to dialysis MWF.  denies needs at this time.           TARYN PIRES - Registered Nurse

## 2022-03-16 ENCOUNTER — READMISSION MANAGEMENT (OUTPATIENT)
Dept: CALL CENTER | Facility: HOSPITAL | Age: 57
End: 2022-03-16

## 2022-03-16 NOTE — OUTREACH NOTE
Medical Week 2 Survey    Flowsheet Row Responses   [facility] patient discharged from? [place]   Does the patient have one of the following disease processes/diagnoses(primary or secondary)? Other   Week 2 attempt successful? No   Unsuccessful attempts Attempt 1          JEOVANY FALCON - Registered Nurse

## 2022-03-22 ENCOUNTER — READMISSION MANAGEMENT (OUTPATIENT)
Dept: CALL CENTER | Facility: HOSPITAL | Age: 57
End: 2022-03-22

## 2022-03-22 NOTE — OUTREACH NOTE
Medical Week 2 Survey    Flowsheet Row Responses   [facility] patient discharged from? [place]   Does the patient have one of the following disease processes/diagnoses(primary or secondary)? Other   Week 2 attempt successful? No   Unsuccessful attempts Attempt 2          SINDY N - Registered Nurse

## 2022-04-27 ENCOUNTER — APPOINTMENT (OUTPATIENT)
Dept: CT IMAGING | Facility: HOSPITAL | Age: 57
End: 2022-04-27

## 2022-04-27 ENCOUNTER — APPOINTMENT (OUTPATIENT)
Dept: GENERAL RADIOLOGY | Facility: HOSPITAL | Age: 57
End: 2022-04-27

## 2022-04-27 ENCOUNTER — HOSPITAL ENCOUNTER (INPATIENT)
Facility: HOSPITAL | Age: 57
LOS: 16 days | Discharge: NURSING, REHAB OR LONG-TERM CARE | End: 2022-05-13
Attending: EMERGENCY MEDICINE | Admitting: INTERNAL MEDICINE

## 2022-04-27 DIAGNOSIS — J96.01 ACUTE HYPOXEMIC RESPIRATORY FAILURE: ICD-10-CM

## 2022-04-27 DIAGNOSIS — E87.5 HYPERKALEMIA: ICD-10-CM

## 2022-04-27 DIAGNOSIS — N18.6 ESRD (END STAGE RENAL DISEASE) ON DIALYSIS: ICD-10-CM

## 2022-04-27 DIAGNOSIS — N18.6 ESRD (END STAGE RENAL DISEASE): ICD-10-CM

## 2022-04-27 DIAGNOSIS — Z99.2 ESRD (END STAGE RENAL DISEASE) ON DIALYSIS: ICD-10-CM

## 2022-04-27 DIAGNOSIS — G93.41 ACUTE METABOLIC ENCEPHALOPATHY: Primary | ICD-10-CM

## 2022-04-27 LAB
ALBUMIN SERPL-MCNC: 3.8 G/DL (ref 3.5–5.2)
ALBUMIN/GLOB SERPL: 1.2 G/DL
ALP SERPL-CCNC: 140 U/L (ref 39–117)
ALT SERPL W P-5'-P-CCNC: 14 U/L (ref 1–33)
AMPHET+METHAMPHET UR QL: NEGATIVE
ANION GAP SERPL CALCULATED.3IONS-SCNC: 13.9 MMOL/L (ref 5–15)
APTT PPP: 28.2 SECONDS (ref 22.7–35.4)
ARTERIAL PATENCY WRIST A: POSITIVE
AST SERPL-CCNC: 30 U/L (ref 1–32)
ATMOSPHERIC PRESS: 759.5 MMHG
BACTERIA UR QL AUTO: ABNORMAL /HPF
BARBITURATES UR QL SCN: NEGATIVE
BASE EXCESS BLDA CALC-SCNC: 1.7 MMOL/L (ref 0–2)
BASOPHILS # BLD AUTO: 0.04 10*3/MM3 (ref 0–0.2)
BASOPHILS NFR BLD AUTO: 0.3 % (ref 0–1.5)
BDY SITE: ABNORMAL
BENZODIAZ UR QL SCN: POSITIVE
BILIRUB SERPL-MCNC: 0.9 MG/DL (ref 0–1.2)
BILIRUB UR QL STRIP: NEGATIVE
BUN SERPL-MCNC: 57 MG/DL (ref 6–20)
BUN/CREAT SERPL: 10.6 (ref 7–25)
CALCIUM SPEC-SCNC: 9 MG/DL (ref 8.6–10.5)
CANNABINOIDS SERPL QL: NEGATIVE
CHLORIDE SERPL-SCNC: 94 MMOL/L (ref 98–107)
CLARITY UR: CLEAR
CO2 SERPL-SCNC: 24.1 MMOL/L (ref 22–29)
COCAINE UR QL: NEGATIVE
COLOR UR: YELLOW
CREAT SERPL-MCNC: 5.37 MG/DL (ref 0.57–1)
DEPRECATED RDW RBC AUTO: 55.2 FL (ref 37–54)
EGFRCR SERPLBLD CKD-EPI 2021: 8.8 ML/MIN/1.73
EOSINOPHIL # BLD AUTO: 0.02 10*3/MM3 (ref 0–0.4)
EOSINOPHIL NFR BLD AUTO: 0.2 % (ref 0.3–6.2)
ERYTHROCYTE [DISTWIDTH] IN BLOOD BY AUTOMATED COUNT: 18.5 % (ref 12.3–15.4)
ETHANOL BLD-MCNC: <10 MG/DL (ref 0–10)
ETHANOL UR QL: <0.01 %
GLOBULIN UR ELPH-MCNC: 3.1 GM/DL
GLUCOSE SERPL-MCNC: 178 MG/DL (ref 65–99)
GLUCOSE UR STRIP-MCNC: ABNORMAL MG/DL
HCO3 BLDA-SCNC: 27 MMOL/L (ref 22–28)
HCT VFR BLD AUTO: 34.1 % (ref 34–46.6)
HGB BLD-MCNC: 11.5 G/DL (ref 12–15.9)
HGB UR QL STRIP.AUTO: ABNORMAL
HYALINE CASTS UR QL AUTO: ABNORMAL /LPF
IMM GRANULOCYTES # BLD AUTO: 0.04 10*3/MM3 (ref 0–0.05)
IMM GRANULOCYTES NFR BLD AUTO: 0.3 % (ref 0–0.5)
INHALED O2 CONCENTRATION: 21 %
INR PPP: 1.02 (ref 0.9–1.1)
KETONES UR QL STRIP: NEGATIVE
LEUKOCYTE ESTERASE UR QL STRIP.AUTO: ABNORMAL
LYMPHOCYTES # BLD AUTO: 1.18 10*3/MM3 (ref 0.7–3.1)
LYMPHOCYTES NFR BLD AUTO: 9.2 % (ref 19.6–45.3)
MCH RBC QN AUTO: 28 PG (ref 26.6–33)
MCHC RBC AUTO-ENTMCNC: 33.7 G/DL (ref 31.5–35.7)
MCV RBC AUTO: 83.2 FL (ref 79–97)
METHADONE UR QL SCN: NEGATIVE
MODALITY: ABNORMAL
MONOCYTES # BLD AUTO: 0.84 10*3/MM3 (ref 0.1–0.9)
MONOCYTES NFR BLD AUTO: 6.5 % (ref 5–12)
NEUTROPHILS NFR BLD AUTO: 10.72 10*3/MM3 (ref 1.7–7)
NEUTROPHILS NFR BLD AUTO: 83.5 % (ref 42.7–76)
NITRITE UR QL STRIP: NEGATIVE
NRBC BLD AUTO-RTO: 0 /100 WBC (ref 0–0.2)
O2 A-A PPRESDIFF RESPIRATORY: 0.5 MMHG
OPIATES UR QL: NEGATIVE
OXYCODONE UR QL SCN: POSITIVE
PCO2 BLDA: 44 MM HG (ref 35–45)
PH BLDA: 7.39 PH UNITS (ref 7.35–7.45)
PH UR STRIP.AUTO: 8 [PH] (ref 5–8)
PLATELET # BLD AUTO: 132 10*3/MM3 (ref 140–450)
PMV BLD AUTO: 10.6 FL (ref 6–12)
PO2 BLDA: 52.8 MM HG (ref 80–100)
POTASSIUM SERPL-SCNC: 6.7 MMOL/L (ref 3.5–5.2)
PROT SERPL-MCNC: 6.9 G/DL (ref 6–8.5)
PROT UR QL STRIP: ABNORMAL
PROTHROMBIN TIME: 13.4 SECONDS (ref 11.7–14.2)
QT INTERVAL: 455 MS
RBC # BLD AUTO: 4.1 10*6/MM3 (ref 3.77–5.28)
RBC # UR STRIP: ABNORMAL /HPF
REF LAB TEST METHOD: ABNORMAL
SAO2 % BLDCOA: 86.6 % (ref 92–99)
SARS-COV-2 RNA RESP QL NAA+PROBE: NOT DETECTED
SODIUM SERPL-SCNC: 132 MMOL/L (ref 136–145)
SP GR UR STRIP: 1.02 (ref 1–1.03)
SQUAMOUS #/AREA URNS HPF: ABNORMAL /HPF
TOTAL RATE: 18 BREATHS/MINUTE
UROBILINOGEN UR QL STRIP: ABNORMAL
WBC # UR STRIP: ABNORMAL /HPF
WBC NRBC COR # BLD: 12.84 10*3/MM3 (ref 3.4–10.8)

## 2022-04-27 PROCEDURE — 94761 N-INVAS EAR/PLS OXIMETRY MLT: CPT

## 2022-04-27 PROCEDURE — 80307 DRUG TEST PRSMV CHEM ANLYZR: CPT | Performed by: EMERGENCY MEDICINE

## 2022-04-27 PROCEDURE — 85610 PROTHROMBIN TIME: CPT | Performed by: EMERGENCY MEDICINE

## 2022-04-27 PROCEDURE — 93005 ELECTROCARDIOGRAM TRACING: CPT | Performed by: EMERGENCY MEDICINE

## 2022-04-27 PROCEDURE — 93010 ELECTROCARDIOGRAM REPORT: CPT | Performed by: INTERNAL MEDICINE

## 2022-04-27 PROCEDURE — 85730 THROMBOPLASTIN TIME PARTIAL: CPT | Performed by: EMERGENCY MEDICINE

## 2022-04-27 PROCEDURE — 87086 URINE CULTURE/COLONY COUNT: CPT | Performed by: EMERGENCY MEDICINE

## 2022-04-27 PROCEDURE — 94760 N-INVAS EAR/PLS OXIMETRY 1: CPT

## 2022-04-27 PROCEDURE — 94799 UNLISTED PULMONARY SVC/PX: CPT

## 2022-04-27 PROCEDURE — 87147 CULTURE TYPE IMMUNOLOGIC: CPT | Performed by: EMERGENCY MEDICINE

## 2022-04-27 PROCEDURE — 82803 BLOOD GASES ANY COMBINATION: CPT

## 2022-04-27 PROCEDURE — 71045 X-RAY EXAM CHEST 1 VIEW: CPT

## 2022-04-27 PROCEDURE — 36600 WITHDRAWAL OF ARTERIAL BLOOD: CPT

## 2022-04-27 PROCEDURE — 70450 CT HEAD/BRAIN W/O DYE: CPT

## 2022-04-27 PROCEDURE — 80053 COMPREHEN METABOLIC PANEL: CPT | Performed by: EMERGENCY MEDICINE

## 2022-04-27 PROCEDURE — U0003 INFECTIOUS AGENT DETECTION BY NUCLEIC ACID (DNA OR RNA); SEVERE ACUTE RESPIRATORY SYNDROME CORONAVIRUS 2 (SARS-COV-2) (CORONAVIRUS DISEASE [COVID-19]), AMPLIFIED PROBE TECHNIQUE, MAKING USE OF HIGH THROUGHPUT TECHNOLOGIES AS DESCRIBED BY CMS-2020-01-R: HCPCS | Performed by: EMERGENCY MEDICINE

## 2022-04-27 PROCEDURE — 63710000001 INSULIN REGULAR HUMAN PER 5 UNITS: Performed by: EMERGENCY MEDICINE

## 2022-04-27 PROCEDURE — 81001 URINALYSIS AUTO W/SCOPE: CPT | Performed by: EMERGENCY MEDICINE

## 2022-04-27 PROCEDURE — 25010000002 HEPARIN (PORCINE) PER 1000 UNITS: Performed by: INTERNAL MEDICINE

## 2022-04-27 PROCEDURE — 85025 COMPLETE CBC W/AUTO DIFF WBC: CPT | Performed by: EMERGENCY MEDICINE

## 2022-04-27 PROCEDURE — P9612 CATHETERIZE FOR URINE SPEC: HCPCS

## 2022-04-27 PROCEDURE — 5A1D70Z PERFORMANCE OF URINARY FILTRATION, INTERMITTENT, LESS THAN 6 HOURS PER DAY: ICD-10-PCS | Performed by: STUDENT IN AN ORGANIZED HEALTH CARE EDUCATION/TRAINING PROGRAM

## 2022-04-27 PROCEDURE — 82077 ASSAY SPEC XCP UR&BREATH IA: CPT | Performed by: EMERGENCY MEDICINE

## 2022-04-27 PROCEDURE — 99284 EMERGENCY DEPT VISIT MOD MDM: CPT

## 2022-04-27 PROCEDURE — 25010000002 ONDANSETRON PER 1 MG: Performed by: EMERGENCY MEDICINE

## 2022-04-27 RX ORDER — ONDANSETRON 2 MG/ML
4 INJECTION INTRAMUSCULAR; INTRAVENOUS EVERY 6 HOURS PRN
Status: DISCONTINUED | OUTPATIENT
Start: 2022-04-27 | End: 2022-05-13 | Stop reason: HOSPADM

## 2022-04-27 RX ORDER — ONDANSETRON 2 MG/ML
4 INJECTION INTRAMUSCULAR; INTRAVENOUS ONCE
Status: COMPLETED | OUTPATIENT
Start: 2022-04-27 | End: 2022-04-27

## 2022-04-27 RX ORDER — HEPARIN SODIUM 1000 [USP'U]/ML
3000 INJECTION, SOLUTION INTRAVENOUS; SUBCUTANEOUS ONCE
Status: COMPLETED | OUTPATIENT
Start: 2022-04-27 | End: 2022-04-27

## 2022-04-27 RX ORDER — DEXTROSE MONOHYDRATE 25 G/50ML
25 INJECTION, SOLUTION INTRAVENOUS ONCE
Status: COMPLETED | OUTPATIENT
Start: 2022-04-27 | End: 2022-04-27

## 2022-04-27 RX ORDER — DEXTROSE MONOHYDRATE 25 G/50ML
25 INJECTION, SOLUTION INTRAVENOUS
Status: DISCONTINUED | OUTPATIENT
Start: 2022-04-27 | End: 2022-05-13 | Stop reason: HOSPADM

## 2022-04-27 RX ORDER — SODIUM CHLORIDE 0.9 % (FLUSH) 0.9 %
10 SYRINGE (ML) INJECTION AS NEEDED
Status: DISCONTINUED | OUTPATIENT
Start: 2022-04-27 | End: 2022-05-13 | Stop reason: HOSPADM

## 2022-04-27 RX ORDER — CALCIUM CHLORIDE 100 MG/ML
1 INJECTION INTRAVENOUS; INTRAVENTRICULAR ONCE
Status: COMPLETED | OUTPATIENT
Start: 2022-04-27 | End: 2022-04-27

## 2022-04-27 RX ORDER — ACETAMINOPHEN 325 MG/1
650 TABLET ORAL EVERY 4 HOURS PRN
Status: DISCONTINUED | OUTPATIENT
Start: 2022-04-27 | End: 2022-05-13 | Stop reason: HOSPADM

## 2022-04-27 RX ORDER — INSULIN LISPRO 100 [IU]/ML
0-9 INJECTION, SOLUTION INTRAVENOUS; SUBCUTANEOUS
Status: DISCONTINUED | OUTPATIENT
Start: 2022-04-28 | End: 2022-04-28

## 2022-04-27 RX ORDER — ALBUTEROL SULFATE 2.5 MG/3ML
2.5 SOLUTION RESPIRATORY (INHALATION) ONCE
Status: COMPLETED | OUTPATIENT
Start: 2022-04-27 | End: 2022-04-27

## 2022-04-27 RX ORDER — UREA 10 %
3 LOTION (ML) TOPICAL NIGHTLY PRN
Status: DISCONTINUED | OUTPATIENT
Start: 2022-04-27 | End: 2022-05-13 | Stop reason: HOSPADM

## 2022-04-27 RX ORDER — NICOTINE POLACRILEX 4 MG
15 LOZENGE BUCCAL
Status: DISCONTINUED | OUTPATIENT
Start: 2022-04-27 | End: 2022-05-13 | Stop reason: HOSPADM

## 2022-04-27 RX ORDER — ONDANSETRON 4 MG/1
4 TABLET, FILM COATED ORAL EVERY 6 HOURS PRN
Status: DISCONTINUED | OUTPATIENT
Start: 2022-04-27 | End: 2022-05-13 | Stop reason: HOSPADM

## 2022-04-27 RX ORDER — NITROGLYCERIN 0.4 MG/1
0.4 TABLET SUBLINGUAL
Status: DISCONTINUED | OUTPATIENT
Start: 2022-04-27 | End: 2022-05-13 | Stop reason: HOSPADM

## 2022-04-27 RX ADMIN — CALCIUM CHLORIDE 1 G: 100 INJECTION, SOLUTION INTRAVENOUS; INTRAVENTRICULAR at 17:37

## 2022-04-27 RX ADMIN — ONDANSETRON 4 MG: 2 INJECTION INTRAMUSCULAR; INTRAVENOUS at 17:55

## 2022-04-27 RX ADMIN — INSULIN HUMAN 5 UNITS: 100 INJECTION, SOLUTION PARENTERAL at 17:38

## 2022-04-27 RX ADMIN — DEXTROSE MONOHYDRATE 25 G: 25 INJECTION, SOLUTION INTRAVENOUS at 17:37

## 2022-04-27 RX ADMIN — ALBUTEROL SULFATE 2.5 MG: 2.5 SOLUTION RESPIRATORY (INHALATION) at 17:34

## 2022-04-27 RX ADMIN — HEPARIN SODIUM 3000 UNITS: 1000 INJECTION INTRAVENOUS; SUBCUTANEOUS at 22:56

## 2022-04-28 ENCOUNTER — APPOINTMENT (OUTPATIENT)
Dept: MRI IMAGING | Facility: HOSPITAL | Age: 57
End: 2022-04-28

## 2022-04-28 PROBLEM — G92.9 ENCEPHALOPATHY, TOXIC: Status: ACTIVE | Noted: 2022-04-28

## 2022-04-28 LAB
ALBUMIN SERPL-MCNC: 3.4 G/DL (ref 3.5–5.2)
ANION GAP SERPL CALCULATED.3IONS-SCNC: 13.5 MMOL/L (ref 5–15)
BASOPHILS # BLD AUTO: 0.07 10*3/MM3 (ref 0–0.2)
BASOPHILS NFR BLD AUTO: 0.5 % (ref 0–1.5)
BUN SERPL-MCNC: 21 MG/DL (ref 6–20)
BUN/CREAT SERPL: 6.9 (ref 7–25)
CALCIUM SPEC-SCNC: 9 MG/DL (ref 8.6–10.5)
CHLORIDE SERPL-SCNC: 97 MMOL/L (ref 98–107)
CO2 SERPL-SCNC: 24.5 MMOL/L (ref 22–29)
CREAT SERPL-MCNC: 3.06 MG/DL (ref 0.57–1)
DEPRECATED RDW RBC AUTO: 53.4 FL (ref 37–54)
EGFRCR SERPLBLD CKD-EPI 2021: 17.3 ML/MIN/1.73
EOSINOPHIL # BLD AUTO: 0.01 10*3/MM3 (ref 0–0.4)
EOSINOPHIL NFR BLD AUTO: 0.1 % (ref 0.3–6.2)
ERYTHROCYTE [DISTWIDTH] IN BLOOD BY AUTOMATED COUNT: 18.4 % (ref 12.3–15.4)
GLUCOSE BLDC GLUCOMTR-MCNC: 105 MG/DL (ref 70–130)
GLUCOSE BLDC GLUCOMTR-MCNC: 113 MG/DL (ref 70–130)
GLUCOSE BLDC GLUCOMTR-MCNC: 135 MG/DL (ref 70–130)
GLUCOSE BLDC GLUCOMTR-MCNC: 242 MG/DL (ref 70–130)
GLUCOSE SERPL-MCNC: 106 MG/DL (ref 65–99)
HBA1C MFR BLD: 5.8 % (ref 4.8–5.6)
HCT VFR BLD AUTO: 33.3 % (ref 34–46.6)
HGB BLD-MCNC: 11.3 G/DL (ref 12–15.9)
LYMPHOCYTES # BLD AUTO: 1.16 10*3/MM3 (ref 0.7–3.1)
LYMPHOCYTES NFR BLD AUTO: 8.6 % (ref 19.6–45.3)
MAGNESIUM SERPL-MCNC: 1.7 MG/DL (ref 1.6–2.6)
MCH RBC QN AUTO: 27.4 PG (ref 26.6–33)
MCHC RBC AUTO-ENTMCNC: 33.9 G/DL (ref 31.5–35.7)
MCV RBC AUTO: 80.6 FL (ref 79–97)
MONOCYTES # BLD AUTO: 1.03 10*3/MM3 (ref 0.1–0.9)
MONOCYTES NFR BLD AUTO: 7.6 % (ref 5–12)
NEUTROPHILS NFR BLD AUTO: 11.17 10*3/MM3 (ref 1.7–7)
NEUTROPHILS NFR BLD AUTO: 82.8 % (ref 42.7–76)
PHOSPHATE SERPL-MCNC: 2.6 MG/DL (ref 2.5–4.5)
PLATELET # BLD AUTO: 137 10*3/MM3 (ref 140–450)
PMV BLD AUTO: 10.4 FL (ref 6–12)
POTASSIUM SERPL-SCNC: 4.6 MMOL/L (ref 3.5–5.2)
PROCALCITONIN SERPL-MCNC: 0.24 NG/ML (ref 0–0.25)
RBC # BLD AUTO: 4.13 10*6/MM3 (ref 3.77–5.28)
SODIUM SERPL-SCNC: 135 MMOL/L (ref 136–145)
WBC NRBC COR # BLD: 13.49 10*3/MM3 (ref 3.4–10.8)

## 2022-04-28 PROCEDURE — 36415 COLL VENOUS BLD VENIPUNCTURE: CPT | Performed by: EMERGENCY MEDICINE

## 2022-04-28 PROCEDURE — 87070 CULTURE OTHR SPECIMN AEROBIC: CPT | Performed by: STUDENT IN AN ORGANIZED HEALTH CARE EDUCATION/TRAINING PROGRAM

## 2022-04-28 PROCEDURE — 0 LIDOCAINE 1 % SOLUTION: Performed by: STUDENT IN AN ORGANIZED HEALTH CARE EDUCATION/TRAINING PROGRAM

## 2022-04-28 PROCEDURE — 0JPT3XZ REMOVAL OF TUNNELED VASCULAR ACCESS DEVICE FROM TRUNK SUBCUTANEOUS TISSUE AND FASCIA, PERCUTANEOUS APPROACH: ICD-10-PCS | Performed by: STUDENT IN AN ORGANIZED HEALTH CARE EDUCATION/TRAINING PROGRAM

## 2022-04-28 PROCEDURE — 25010000002 HYDRALAZINE PER 20 MG

## 2022-04-28 PROCEDURE — 80069 RENAL FUNCTION PANEL: CPT | Performed by: INTERNAL MEDICINE

## 2022-04-28 PROCEDURE — 70551 MRI BRAIN STEM W/O DYE: CPT

## 2022-04-28 PROCEDURE — 87186 SC STD MICRODIL/AGAR DIL: CPT | Performed by: STUDENT IN AN ORGANIZED HEALTH CARE EDUCATION/TRAINING PROGRAM

## 2022-04-28 PROCEDURE — 82962 GLUCOSE BLOOD TEST: CPT

## 2022-04-28 PROCEDURE — 84145 PROCALCITONIN (PCT): CPT | Performed by: HOSPITALIST

## 2022-04-28 PROCEDURE — 25010000002 ONDANSETRON PER 1 MG: Performed by: INTERNAL MEDICINE

## 2022-04-28 PROCEDURE — 83036 HEMOGLOBIN GLYCOSYLATED A1C: CPT | Performed by: INTERNAL MEDICINE

## 2022-04-28 PROCEDURE — 87147 CULTURE TYPE IMMUNOLOGIC: CPT | Performed by: STUDENT IN AN ORGANIZED HEALTH CARE EDUCATION/TRAINING PROGRAM

## 2022-04-28 PROCEDURE — 85025 COMPLETE CBC W/AUTO DIFF WBC: CPT | Performed by: INTERNAL MEDICINE

## 2022-04-28 PROCEDURE — 99222 1ST HOSP IP/OBS MODERATE 55: CPT | Performed by: STUDENT IN AN ORGANIZED HEALTH CARE EDUCATION/TRAINING PROGRAM

## 2022-04-28 PROCEDURE — 87040 BLOOD CULTURE FOR BACTERIA: CPT | Performed by: HOSPITALIST

## 2022-04-28 PROCEDURE — 83735 ASSAY OF MAGNESIUM: CPT | Performed by: EMERGENCY MEDICINE

## 2022-04-28 PROCEDURE — 63710000001 INSULIN LISPRO (HUMAN) PER 5 UNITS: Performed by: HOSPITALIST

## 2022-04-28 RX ORDER — HYDRALAZINE HYDROCHLORIDE 20 MG/ML
20 INJECTION INTRAMUSCULAR; INTRAVENOUS ONCE
Status: COMPLETED | OUTPATIENT
Start: 2022-04-28 | End: 2022-04-28

## 2022-04-28 RX ORDER — LEVOTHYROXINE SODIUM 0.12 MG/1
125 TABLET ORAL
Status: DISCONTINUED | OUTPATIENT
Start: 2022-04-28 | End: 2022-05-13 | Stop reason: HOSPADM

## 2022-04-28 RX ORDER — PANTOPRAZOLE SODIUM 40 MG/1
40 TABLET, DELAYED RELEASE ORAL DAILY
Status: DISCONTINUED | OUTPATIENT
Start: 2022-04-28 | End: 2022-04-30

## 2022-04-28 RX ORDER — ROPINIROLE 0.5 MG/1
0.75 TABLET, FILM COATED ORAL NIGHTLY
Status: DISCONTINUED | OUTPATIENT
Start: 2022-04-28 | End: 2022-05-13 | Stop reason: HOSPADM

## 2022-04-28 RX ORDER — OLANZAPINE 5 MG/1
5 TABLET ORAL 2 TIMES DAILY
Status: DISCONTINUED | OUTPATIENT
Start: 2022-04-28 | End: 2022-05-03

## 2022-04-28 RX ORDER — HYDRALAZINE HYDROCHLORIDE 50 MG/1
100 TABLET, FILM COATED ORAL 3 TIMES DAILY
Status: DISCONTINUED | OUTPATIENT
Start: 2022-04-28 | End: 2022-04-30

## 2022-04-28 RX ORDER — INSULIN LISPRO 100 [IU]/ML
0-7 INJECTION, SOLUTION INTRAVENOUS; SUBCUTANEOUS
Status: DISCONTINUED | OUTPATIENT
Start: 2022-04-28 | End: 2022-05-03

## 2022-04-28 RX ORDER — AMLODIPINE BESYLATE 10 MG/1
10 TABLET ORAL DAILY
Status: DISCONTINUED | OUTPATIENT
Start: 2022-04-28 | End: 2022-04-30

## 2022-04-28 RX ORDER — HEPARIN SODIUM 1000 [USP'U]/ML
3800 INJECTION, SOLUTION INTRAVENOUS; SUBCUTANEOUS AS NEEDED
Status: DISCONTINUED | OUTPATIENT
Start: 2022-04-28 | End: 2022-05-13 | Stop reason: HOSPADM

## 2022-04-28 RX ORDER — TAMSULOSIN HYDROCHLORIDE 0.4 MG/1
0.4 CAPSULE ORAL DAILY
Status: DISCONTINUED | OUTPATIENT
Start: 2022-04-28 | End: 2022-05-13 | Stop reason: HOSPADM

## 2022-04-28 RX ORDER — LIDOCAINE HYDROCHLORIDE 10 MG/ML
20 INJECTION, SOLUTION INFILTRATION; PERINEURAL ONCE
Status: COMPLETED | OUTPATIENT
Start: 2022-04-28 | End: 2022-04-28

## 2022-04-28 RX ORDER — FUROSEMIDE 40 MG/1
40 TABLET ORAL 2 TIMES DAILY
Status: DISCONTINUED | OUTPATIENT
Start: 2022-04-28 | End: 2022-04-30

## 2022-04-28 RX ORDER — FOLIC ACID 1 MG/1
1 TABLET ORAL DAILY
Status: DISCONTINUED | OUTPATIENT
Start: 2022-04-28 | End: 2022-04-30

## 2022-04-28 RX ORDER — INSULIN LISPRO 100 [IU]/ML
3 INJECTION, SOLUTION INTRAVENOUS; SUBCUTANEOUS
Status: DISCONTINUED | OUTPATIENT
Start: 2022-04-28 | End: 2022-04-29

## 2022-04-28 RX ORDER — MELATONIN
5000 DAILY
Status: DISCONTINUED | OUTPATIENT
Start: 2022-04-28 | End: 2022-04-30

## 2022-04-28 RX ORDER — MULTIPLE VITAMINS W/ MINERALS TAB 9MG-400MCG
1 TAB ORAL DAILY
Status: DISCONTINUED | OUTPATIENT
Start: 2022-04-28 | End: 2022-04-30

## 2022-04-28 RX ORDER — ASPIRIN 81 MG/1
81 TABLET ORAL DAILY
Status: DISCONTINUED | OUTPATIENT
Start: 2022-04-28 | End: 2022-04-29

## 2022-04-28 RX ORDER — SERTRALINE HYDROCHLORIDE 100 MG/1
100 TABLET, FILM COATED ORAL DAILY
Status: DISCONTINUED | OUTPATIENT
Start: 2022-04-28 | End: 2022-05-13 | Stop reason: HOSPADM

## 2022-04-28 RX ADMIN — HYDRALAZINE HYDROCHLORIDE 100 MG: 50 TABLET, FILM COATED ORAL at 20:49

## 2022-04-28 RX ADMIN — FUROSEMIDE 40 MG: 40 TABLET ORAL at 17:06

## 2022-04-28 RX ADMIN — OLANZAPINE 5 MG: 5 TABLET ORAL at 20:49

## 2022-04-28 RX ADMIN — MULTIPLE VITAMINS W/ MINERALS TAB 1 TABLET: TAB at 17:06

## 2022-04-28 RX ADMIN — ONDANSETRON 4 MG: 2 INJECTION INTRAMUSCULAR; INTRAVENOUS at 13:58

## 2022-04-28 RX ADMIN — FUROSEMIDE 40 MG: 40 TABLET ORAL at 20:49

## 2022-04-28 RX ADMIN — INSULIN LISPRO 3 UNITS: 100 INJECTION, SOLUTION INTRAVENOUS; SUBCUTANEOUS at 20:49

## 2022-04-28 RX ADMIN — AMLODIPINE BESYLATE 10 MG: 10 TABLET ORAL at 13:33

## 2022-04-28 RX ADMIN — HYDRALAZINE HYDROCHLORIDE 100 MG: 50 TABLET, FILM COATED ORAL at 13:34

## 2022-04-28 RX ADMIN — LEVOTHYROXINE SODIUM 125 MCG: 0.12 TABLET ORAL at 13:33

## 2022-04-28 RX ADMIN — Medication 5000 UNITS: at 17:06

## 2022-04-28 RX ADMIN — METOPROLOL TARTRATE 25 MG: 25 TABLET, FILM COATED ORAL at 20:49

## 2022-04-28 RX ADMIN — LIDOCAINE HYDROCHLORIDE 20 ML: 10 INJECTION, SOLUTION INFILTRATION; PERINEURAL at 13:35

## 2022-04-28 RX ADMIN — ASPIRIN 81 MG: 81 TABLET, COATED ORAL at 13:33

## 2022-04-28 RX ADMIN — HYDRALAZINE HYDROCHLORIDE 20 MG: 20 INJECTION INTRAMUSCULAR; INTRAVENOUS at 01:52

## 2022-04-28 RX ADMIN — SERTRALINE 100 MG: 100 TABLET, FILM COATED ORAL at 13:33

## 2022-04-28 RX ADMIN — ROPINIROLE HYDROCHLORIDE 0.75 MG: 0.5 TABLET, FILM COATED ORAL at 20:49

## 2022-04-28 RX ADMIN — TAMSULOSIN HYDROCHLORIDE 0.4 MG: 0.4 CAPSULE ORAL at 13:33

## 2022-04-28 RX ADMIN — FOLIC ACID 1 MG: 1 TABLET ORAL at 17:06

## 2022-04-28 RX ADMIN — METOPROLOL TARTRATE 25 MG: 25 TABLET, FILM COATED ORAL at 13:33

## 2022-04-28 RX ADMIN — PANTOPRAZOLE SODIUM 40 MG: 40 TABLET, DELAYED RELEASE ORAL at 13:34

## 2022-04-29 ENCOUNTER — APPOINTMENT (OUTPATIENT)
Dept: CARDIOLOGY | Facility: HOSPITAL | Age: 57
End: 2022-04-29

## 2022-04-29 ENCOUNTER — APPOINTMENT (OUTPATIENT)
Dept: CT IMAGING | Facility: HOSPITAL | Age: 57
End: 2022-04-29

## 2022-04-29 LAB
ALBUMIN SERPL-MCNC: 2.9 G/DL (ref 3.5–5.2)
ANION GAP SERPL CALCULATED.3IONS-SCNC: 10.8 MMOL/L (ref 5–15)
APTT PPP: 29.4 SECONDS (ref 22.7–35.4)
BACTERIA SPEC AEROBE CULT: ABNORMAL
BASOPHILS # BLD AUTO: 0.03 10*3/MM3 (ref 0–0.2)
BASOPHILS # BLD AUTO: 0.05 10*3/MM3 (ref 0–0.2)
BASOPHILS NFR BLD AUTO: 0.2 % (ref 0–1.5)
BASOPHILS NFR BLD AUTO: 0.3 % (ref 0–1.5)
BUN SERPL-MCNC: 32 MG/DL (ref 6–20)
BUN/CREAT SERPL: 7.8 (ref 7–25)
CALCIUM SPEC-SCNC: 8.4 MG/DL (ref 8.6–10.5)
CHLORIDE SERPL-SCNC: 95 MMOL/L (ref 98–107)
CO2 SERPL-SCNC: 26.2 MMOL/L (ref 22–29)
CREAT SERPL-MCNC: 4.12 MG/DL (ref 0.57–1)
DEPRECATED RDW RBC AUTO: 54.2 FL (ref 37–54)
DEPRECATED RDW RBC AUTO: 55.5 FL (ref 37–54)
EGFRCR SERPLBLD CKD-EPI 2021: 12.1 ML/MIN/1.73
EOSINOPHIL # BLD AUTO: 0.03 10*3/MM3 (ref 0–0.4)
EOSINOPHIL # BLD AUTO: 0.04 10*3/MM3 (ref 0–0.4)
EOSINOPHIL NFR BLD AUTO: 0.2 % (ref 0.3–6.2)
EOSINOPHIL NFR BLD AUTO: 0.3 % (ref 0.3–6.2)
ERYTHROCYTE [DISTWIDTH] IN BLOOD BY AUTOMATED COUNT: 18.4 % (ref 12.3–15.4)
ERYTHROCYTE [DISTWIDTH] IN BLOOD BY AUTOMATED COUNT: 18.5 % (ref 12.3–15.4)
FIBRINOGEN PPP-MCNC: 381 MG/DL (ref 219–464)
GLUCOSE BLDC GLUCOMTR-MCNC: 129 MG/DL (ref 70–130)
GLUCOSE BLDC GLUCOMTR-MCNC: 170 MG/DL (ref 70–130)
GLUCOSE BLDC GLUCOMTR-MCNC: 72 MG/DL (ref 70–130)
GLUCOSE BLDC GLUCOMTR-MCNC: 74 MG/DL (ref 70–130)
GLUCOSE BLDC GLUCOMTR-MCNC: 79 MG/DL (ref 70–130)
GLUCOSE SERPL-MCNC: 179 MG/DL (ref 65–99)
HCT VFR BLD AUTO: 31.9 % (ref 34–46.6)
HCT VFR BLD AUTO: 31.9 % (ref 34–46.6)
HGB BLD-MCNC: 10.5 G/DL (ref 12–15.9)
HGB BLD-MCNC: 11 G/DL (ref 12–15.9)
IMM GRANULOCYTES # BLD AUTO: 0.1 10*3/MM3 (ref 0–0.05)
IMM GRANULOCYTES NFR BLD AUTO: 0.6 % (ref 0–0.5)
INR PPP: 0.97 (ref 0.9–1.1)
LYMPHOCYTES # BLD AUTO: 1 10*3/MM3 (ref 0.7–3.1)
LYMPHOCYTES # BLD AUTO: 1.72 10*3/MM3 (ref 0.7–3.1)
LYMPHOCYTES NFR BLD AUTO: 12 % (ref 19.6–45.3)
LYMPHOCYTES NFR BLD AUTO: 5.9 % (ref 19.6–45.3)
MCH RBC QN AUTO: 27.4 PG (ref 26.6–33)
MCH RBC QN AUTO: 28.4 PG (ref 26.6–33)
MCHC RBC AUTO-ENTMCNC: 32.9 G/DL (ref 31.5–35.7)
MCHC RBC AUTO-ENTMCNC: 34.5 G/DL (ref 31.5–35.7)
MCV RBC AUTO: 82.2 FL (ref 79–97)
MCV RBC AUTO: 83.3 FL (ref 79–97)
MONOCYTES # BLD AUTO: 1.07 10*3/MM3 (ref 0.1–0.9)
MONOCYTES # BLD AUTO: 1.27 10*3/MM3 (ref 0.1–0.9)
MONOCYTES NFR BLD AUTO: 6.3 % (ref 5–12)
MONOCYTES NFR BLD AUTO: 8.9 % (ref 5–12)
NEUTROPHILS NFR BLD AUTO: 11.22 10*3/MM3 (ref 1.7–7)
NEUTROPHILS NFR BLD AUTO: 14.78 10*3/MM3 (ref 1.7–7)
NEUTROPHILS NFR BLD AUTO: 78.2 % (ref 42.7–76)
NEUTROPHILS NFR BLD AUTO: 86.7 % (ref 42.7–76)
NRBC BLD AUTO-RTO: 0 /100 WBC (ref 0–0.2)
PHOSPHATE SERPL-MCNC: 3.7 MG/DL (ref 2.5–4.5)
PLATELET # BLD AUTO: 128 10*3/MM3 (ref 140–450)
PLATELET # BLD AUTO: 150 10*3/MM3 (ref 140–450)
PMV BLD AUTO: 10.1 FL (ref 6–12)
PMV BLD AUTO: 10.4 FL (ref 6–12)
POTASSIUM SERPL-SCNC: 4.3 MMOL/L (ref 3.5–5.2)
PROTHROMBIN TIME: 12.8 SECONDS (ref 11.7–14.2)
RBC # BLD AUTO: 3.83 10*6/MM3 (ref 3.77–5.28)
RBC # BLD AUTO: 3.88 10*6/MM3 (ref 3.77–5.28)
SODIUM SERPL-SCNC: 132 MMOL/L (ref 136–145)
WBC NRBC COR # BLD: 14.34 10*3/MM3 (ref 3.4–10.8)
WBC NRBC COR # BLD: 17.03 10*3/MM3 (ref 3.4–10.8)

## 2022-04-29 PROCEDURE — 25010000002 VANCOMYCIN PER 500 MG: Performed by: HOSPITALIST

## 2022-04-29 PROCEDURE — 70496 CT ANGIOGRAPHY HEAD: CPT

## 2022-04-29 PROCEDURE — 99291 CRITICAL CARE FIRST HOUR: CPT | Performed by: PSYCHIATRY & NEUROLOGY

## 2022-04-29 PROCEDURE — P9012 CRYOPRECIPITATE EACH UNIT: HCPCS

## 2022-04-29 PROCEDURE — 25010000002 LORAZEPAM PER 2 MG: Performed by: PSYCHIATRY & NEUROLOGY

## 2022-04-29 PROCEDURE — 70498 CT ANGIOGRAPHY NECK: CPT

## 2022-04-29 PROCEDURE — 63710000001 INSULIN LISPRO (HUMAN) PER 5 UNITS: Performed by: INTERNAL MEDICINE

## 2022-04-29 PROCEDURE — 82962 GLUCOSE BLOOD TEST: CPT

## 2022-04-29 PROCEDURE — 85025 COMPLETE CBC W/AUTO DIFF WBC: CPT | Performed by: PSYCHIATRY & NEUROLOGY

## 2022-04-29 PROCEDURE — 85384 FIBRINOGEN ACTIVITY: CPT | Performed by: PSYCHIATRY & NEUROLOGY

## 2022-04-29 PROCEDURE — 86927 PLASMA FRESH FROZEN: CPT

## 2022-04-29 PROCEDURE — 99223 1ST HOSP IP/OBS HIGH 75: CPT | Performed by: STUDENT IN AN ORGANIZED HEALTH CARE EDUCATION/TRAINING PROGRAM

## 2022-04-29 PROCEDURE — 63710000001 INSULIN LISPRO (HUMAN) PER 5 UNITS: Performed by: HOSPITALIST

## 2022-04-29 PROCEDURE — 85610 PROTHROMBIN TIME: CPT | Performed by: PSYCHIATRY & NEUROLOGY

## 2022-04-29 PROCEDURE — 25010000002 HYDRALAZINE PER 20 MG: Performed by: INTERNAL MEDICINE

## 2022-04-29 PROCEDURE — 25010000002 TENECTEPLASE PER 50 MG: Performed by: PSYCHIATRY & NEUROLOGY

## 2022-04-29 PROCEDURE — 0042T HC CT CEREBRAL PERFUSION W/WO CONTRAST: CPT

## 2022-04-29 PROCEDURE — 80069 RENAL FUNCTION PANEL: CPT | Performed by: INTERNAL MEDICINE

## 2022-04-29 PROCEDURE — 36430 TRANSFUSION BLD/BLD COMPNT: CPT

## 2022-04-29 PROCEDURE — 85730 THROMBOPLASTIN TIME PARTIAL: CPT | Performed by: PSYCHIATRY & NEUROLOGY

## 2022-04-29 PROCEDURE — 0 IOPAMIDOL PER 1 ML: Performed by: HOSPITALIST

## 2022-04-29 PROCEDURE — 93306 TTE W/DOPPLER COMPLETE: CPT | Performed by: INTERNAL MEDICINE

## 2022-04-29 PROCEDURE — 85025 COMPLETE CBC W/AUTO DIFF WBC: CPT | Performed by: INTERNAL MEDICINE

## 2022-04-29 PROCEDURE — 93306 TTE W/DOPPLER COMPLETE: CPT

## 2022-04-29 PROCEDURE — 70450 CT HEAD/BRAIN W/O DYE: CPT

## 2022-04-29 RX ORDER — ATORVASTATIN CALCIUM 20 MG/1
40 TABLET, FILM COATED ORAL NIGHTLY
Status: DISCONTINUED | OUTPATIENT
Start: 2022-04-29 | End: 2022-05-05

## 2022-04-29 RX ORDER — VANCOMYCIN HYDROCHLORIDE 1 G/200ML
20 INJECTION, SOLUTION INTRAVENOUS ONCE
Status: COMPLETED | OUTPATIENT
Start: 2022-04-29 | End: 2022-04-29

## 2022-04-29 RX ORDER — ASPIRIN 81 MG/1
81 TABLET, CHEWABLE ORAL DAILY
Status: DISCONTINUED | OUTPATIENT
Start: 2022-04-30 | End: 2022-04-29

## 2022-04-29 RX ORDER — ASPIRIN 300 MG/1
300 SUPPOSITORY RECTAL DAILY
Status: DISCONTINUED | OUTPATIENT
Start: 2022-04-30 | End: 2022-04-29

## 2022-04-29 RX ORDER — SODIUM CHLORIDE 0.9 % (FLUSH) 0.9 %
10 SYRINGE (ML) INJECTION EVERY 12 HOURS SCHEDULED
Status: DISCONTINUED | OUTPATIENT
Start: 2022-04-29 | End: 2022-05-13 | Stop reason: HOSPADM

## 2022-04-29 RX ORDER — HYDRALAZINE HYDROCHLORIDE 20 MG/ML
10 INJECTION INTRAMUSCULAR; INTRAVENOUS EVERY 4 HOURS PRN
Status: DISCONTINUED | OUTPATIENT
Start: 2022-04-29 | End: 2022-05-13 | Stop reason: HOSPADM

## 2022-04-29 RX ORDER — LORAZEPAM 2 MG/ML
1 INJECTION INTRAMUSCULAR ONCE AS NEEDED
Status: COMPLETED | OUTPATIENT
Start: 2022-04-29 | End: 2022-04-29

## 2022-04-29 RX ORDER — SODIUM CHLORIDE 0.9 % (FLUSH) 0.9 %
10 SYRINGE (ML) INJECTION ONCE
Status: COMPLETED | OUTPATIENT
Start: 2022-04-29 | End: 2022-04-29

## 2022-04-29 RX ORDER — SODIUM CHLORIDE 0.9 % (FLUSH) 0.9 %
10 SYRINGE (ML) INJECTION AS NEEDED
Status: DISCONTINUED | OUTPATIENT
Start: 2022-04-29 | End: 2022-05-13 | Stop reason: HOSPADM

## 2022-04-29 RX ADMIN — LORAZEPAM 1 MG: 2 INJECTION INTRAMUSCULAR; INTRAVENOUS at 21:59

## 2022-04-29 RX ADMIN — HYDRALAZINE HYDROCHLORIDE 100 MG: 50 TABLET, FILM COATED ORAL at 08:37

## 2022-04-29 RX ADMIN — HYDRALAZINE HYDROCHLORIDE 10 MG: 20 INJECTION INTRAMUSCULAR; INTRAVENOUS at 21:38

## 2022-04-29 RX ADMIN — TAMSULOSIN HYDROCHLORIDE 0.4 MG: 0.4 CAPSULE ORAL at 08:37

## 2022-04-29 RX ADMIN — SERTRALINE 100 MG: 100 TABLET, FILM COATED ORAL at 08:37

## 2022-04-29 RX ADMIN — VANCOMYCIN HYDROCHLORIDE 1000 MG: 1 INJECTION, SOLUTION INTRAVENOUS at 10:49

## 2022-04-29 RX ADMIN — METOPROLOL TARTRATE 25 MG: 25 TABLET, FILM COATED ORAL at 08:47

## 2022-04-29 RX ADMIN — IOPAMIDOL 50 ML: 755 INJECTION, SOLUTION INTRAVENOUS at 16:19

## 2022-04-29 RX ADMIN — AMLODIPINE BESYLATE 10 MG: 10 TABLET ORAL at 08:37

## 2022-04-29 RX ADMIN — ASPIRIN 81 MG: 81 TABLET, COATED ORAL at 08:38

## 2022-04-29 RX ADMIN — IOPAMIDOL 100 ML: 755 INJECTION, SOLUTION INTRAVENOUS at 16:19

## 2022-04-29 RX ADMIN — Medication 10 ML: at 17:59

## 2022-04-29 RX ADMIN — FUROSEMIDE 40 MG: 40 TABLET ORAL at 08:37

## 2022-04-29 RX ADMIN — FOLIC ACID 1 MG: 1 TABLET ORAL at 08:38

## 2022-04-29 RX ADMIN — LEVOTHYROXINE SODIUM 125 MCG: 0.12 TABLET ORAL at 05:53

## 2022-04-29 RX ADMIN — INSULIN LISPRO 2 UNITS: 100 INJECTION, SOLUTION INTRAVENOUS; SUBCUTANEOUS at 13:23

## 2022-04-29 RX ADMIN — MULTIPLE VITAMINS W/ MINERALS TAB 1 TABLET: TAB at 08:37

## 2022-04-29 RX ADMIN — INSULIN LISPRO 3 UNITS: 100 INJECTION, SOLUTION INTRAVENOUS; SUBCUTANEOUS at 08:47

## 2022-04-29 RX ADMIN — PANTOPRAZOLE SODIUM 40 MG: 40 TABLET, DELAYED RELEASE ORAL at 08:37

## 2022-04-29 RX ADMIN — INSULIN GLARGINE-YFGN 10 UNITS: 100 INJECTION, SOLUTION SUBCUTANEOUS at 10:48

## 2022-04-29 RX ADMIN — DEXTROSE MONOHYDRATE 25 G: 25 INJECTION, SOLUTION INTRAVENOUS at 21:09

## 2022-04-29 RX ADMIN — Medication 10 ML: at 17:58

## 2022-04-29 RX ADMIN — Medication 5000 UNITS: at 08:37

## 2022-04-29 RX ADMIN — INSULIN LISPRO 3 UNITS: 100 INJECTION, SOLUTION INTRAVENOUS; SUBCUTANEOUS at 13:23

## 2022-04-29 RX ADMIN — Medication 13 MG: at 16:28

## 2022-04-30 ENCOUNTER — APPOINTMENT (OUTPATIENT)
Dept: MRI IMAGING | Facility: HOSPITAL | Age: 57
End: 2022-04-30

## 2022-04-30 ENCOUNTER — APPOINTMENT (OUTPATIENT)
Dept: CT IMAGING | Facility: HOSPITAL | Age: 57
End: 2022-04-30

## 2022-04-30 LAB
ALBUMIN SERPL-MCNC: 3.2 G/DL (ref 3.5–5.2)
ANION GAP SERPL CALCULATED.3IONS-SCNC: 15 MMOL/L (ref 5–15)
AORTIC DIMENSIONLESS INDEX: 0.8 (DI)
BH BB BLOOD EXPIRATION DATE: NORMAL
BH BB BLOOD EXPIRATION DATE: NORMAL
BH BB BLOOD TYPE BARCODE: 6200
BH BB BLOOD TYPE BARCODE: 6200
BH BB DISPENSE STATUS: NORMAL
BH BB DISPENSE STATUS: NORMAL
BH BB PRODUCT CODE: NORMAL
BH BB PRODUCT CODE: NORMAL
BH BB UNIT NUMBER: NORMAL
BH BB UNIT NUMBER: NORMAL
BH CV ECHO MEAS - AO MAX PG: 8.5 MMHG
BH CV ECHO MEAS - AO MEAN PG: 4.5 MMHG
BH CV ECHO MEAS - AO V2 MAX: 145.5 CM/SEC
BH CV ECHO MEAS - AO V2 VTI: 30.4 CM
BH CV ECHO MEAS - AVA(I,D): 1.77 CM2
BH CV ECHO MEAS - EDV(CUBED): 122.9 ML
BH CV ECHO MEAS - EDV(MOD-SP2): 94 ML
BH CV ECHO MEAS - EDV(MOD-SP4): 100 ML
BH CV ECHO MEAS - EF(MOD-BP): 66.8 %
BH CV ECHO MEAS - EF(MOD-SP2): 61.7 %
BH CV ECHO MEAS - EF(MOD-SP4): 71 %
BH CV ECHO MEAS - ESV(CUBED): 32.8 ML
BH CV ECHO MEAS - ESV(MOD-SP2): 36 ML
BH CV ECHO MEAS - ESV(MOD-SP4): 29 ML
BH CV ECHO MEAS - FS: 35.6 %
BH CV ECHO MEAS - IVS/LVPW: 1.06 CM
BH CV ECHO MEAS - IVSD: 0.91 CM
BH CV ECHO MEAS - LAT PEAK E' VEL: 8.4 CM/SEC
BH CV ECHO MEAS - LV DIASTOLIC VOL/BSA (35-75): 67.2 CM2
BH CV ECHO MEAS - LV MASS(C)D: 154.5 GRAMS
BH CV ECHO MEAS - LV MAX PG: 4.7 MMHG
BH CV ECHO MEAS - LV MEAN PG: 2.48 MMHG
BH CV ECHO MEAS - LV SYSTOLIC VOL/BSA (12-30): 19.5 CM2
BH CV ECHO MEAS - LV V1 MAX: 108 CM/SEC
BH CV ECHO MEAS - LV V1 VTI: 25.3 CM
BH CV ECHO MEAS - LVIDD: 5 CM
BH CV ECHO MEAS - LVIDS: 3.2 CM
BH CV ECHO MEAS - LVOT AREA: 2.13 CM2
BH CV ECHO MEAS - LVOT DIAM: 1.65 CM
BH CV ECHO MEAS - LVPWD: 0.87 CM
BH CV ECHO MEAS - MED PEAK E' VEL: 5 CM/SEC
BH CV ECHO MEAS - MR MAX PG: 112.2 MMHG
BH CV ECHO MEAS - MR MAX VEL: 529.6 CM/SEC
BH CV ECHO MEAS - MV A DUR: 0.18 SEC
BH CV ECHO MEAS - MV A MAX VEL: 86.5 CM/SEC
BH CV ECHO MEAS - MV DEC SLOPE: 571.4 CM/SEC2
BH CV ECHO MEAS - MV DEC TIME: 180 MSEC
BH CV ECHO MEAS - MV E MAX VEL: 105.5 CM/SEC
BH CV ECHO MEAS - MV E/A: 1.22
BH CV ECHO MEAS - MV MAX PG: 6.6 MMHG
BH CV ECHO MEAS - MV MEAN PG: 2.21 MMHG
BH CV ECHO MEAS - MV P1/2T: 68.1 MSEC
BH CV ECHO MEAS - MV V2 VTI: 34.2 CM
BH CV ECHO MEAS - MVA(P1/2T): 3.2 CM2
BH CV ECHO MEAS - MVA(VTI): 1.57 CM2
BH CV ECHO MEAS - PA ACC TIME: 0.15 SEC
BH CV ECHO MEAS - PA PR(ACCEL): 12.5 MMHG
BH CV ECHO MEAS - PA V2 MAX: 83.8 CM/SEC
BH CV ECHO MEAS - PULM A REVS DUR: 0.17 SEC
BH CV ECHO MEAS - PULM A REVS VEL: 27.4 CM/SEC
BH CV ECHO MEAS - PULM DIAS VEL: 60 CM/SEC
BH CV ECHO MEAS - PULM S/D: 1.31
BH CV ECHO MEAS - PULM SYS VEL: 78.4 CM/SEC
BH CV ECHO MEAS - QP/QS: 0.65
BH CV ECHO MEAS - RAP SYSTOLE: 8 MMHG
BH CV ECHO MEAS - RV MAX PG: 1.79 MMHG
BH CV ECHO MEAS - RV V1 MAX: 66.8 CM/SEC
BH CV ECHO MEAS - RV V1 VTI: 13.8 CM
BH CV ECHO MEAS - RVOT DIAM: 1.8 CM
BH CV ECHO MEAS - RVSP: 48.5 MMHG
BH CV ECHO MEAS - SI(MOD-SP2): 39 ML/M2
BH CV ECHO MEAS - SI(MOD-SP4): 47.7 ML/M2
BH CV ECHO MEAS - SV(LVOT): 53.9 ML
BH CV ECHO MEAS - SV(MOD-SP2): 58 ML
BH CV ECHO MEAS - SV(MOD-SP4): 71 ML
BH CV ECHO MEAS - SV(RVOT): 35.2 ML
BH CV ECHO MEAS - TAPSE (>1.6): 2.1 CM
BH CV ECHO MEAS - TR MAX PG: 40.5 MMHG
BH CV ECHO MEAS - TR MAX VEL: 318.2 CM/SEC
BH CV ECHO MEASUREMENTS AVERAGE E/E' RATIO: 15.75
BH CV VAS BP RIGHT ARM: NORMAL MMHG
BH CV XLRA - RV BASE: 3.4 CM
BH CV XLRA - RV LENGTH: 6.5 CM
BH CV XLRA - RV MID: 2.12 CM
BH CV XLRA - TDI S': 12.3 CM/SEC
BUN SERPL-MCNC: 36 MG/DL (ref 6–20)
BUN/CREAT SERPL: 7.4 (ref 7–25)
CALCIUM SPEC-SCNC: 8 MG/DL (ref 8.6–10.5)
CATHETER CULTURE: ABNORMAL
CHLORIDE SERPL-SCNC: 93 MMOL/L (ref 98–107)
CHOLEST SERPL-MCNC: 145 MG/DL (ref 0–200)
CO2 SERPL-SCNC: 22 MMOL/L (ref 22–29)
CREAT SERPL-MCNC: 4.89 MG/DL (ref 0.57–1)
EGFRCR SERPLBLD CKD-EPI 2021: 9.9 ML/MIN/1.73
FIBRINOGEN PPP-MCNC: 494 MG/DL (ref 219–464)
GLUCOSE BLDC GLUCOMTR-MCNC: 133 MG/DL (ref 70–130)
GLUCOSE BLDC GLUCOMTR-MCNC: 74 MG/DL (ref 70–130)
GLUCOSE BLDC GLUCOMTR-MCNC: 80 MG/DL (ref 70–130)
GLUCOSE BLDC GLUCOMTR-MCNC: 87 MG/DL (ref 70–130)
GLUCOSE SERPL-MCNC: 94 MG/DL (ref 65–99)
HDLC SERPL-MCNC: 44 MG/DL (ref 40–60)
LDLC SERPL CALC-MCNC: 80 MG/DL (ref 0–100)
LDLC/HDLC SERPL: 1.75 {RATIO}
LEFT ATRIUM VOLUME INDEX: 46 ML/M2
LEFT ATRIUM VOLUME: 69 CM3
MAXIMAL PREDICTED HEART RATE: 164 BPM
PHOSPHATE SERPL-MCNC: 3.8 MG/DL (ref 2.5–4.5)
POTASSIUM SERPL-SCNC: 4.9 MMOL/L (ref 3.5–5.2)
SINUS: 3.1 CM
SODIUM SERPL-SCNC: 130 MMOL/L (ref 136–145)
STRESS TARGET HR: 139 BPM
TRIGL SERPL-MCNC: 119 MG/DL (ref 0–150)
UNIT  ABO: NORMAL
UNIT  ABO: NORMAL
UNIT  RH: NORMAL
UNIT  RH: NORMAL
VANCOMYCIN SERPL-MCNC: 12.7 MCG/ML (ref 5–40)
VLDLC SERPL-MCNC: 21 MG/DL (ref 5–40)

## 2022-04-30 PROCEDURE — 99222 1ST HOSP IP/OBS MODERATE 55: CPT | Performed by: NEUROLOGICAL SURGERY

## 2022-04-30 PROCEDURE — 86927 PLASMA FRESH FROZEN: CPT

## 2022-04-30 PROCEDURE — 70551 MRI BRAIN STEM W/O DYE: CPT

## 2022-04-30 PROCEDURE — 70450 CT HEAD/BRAIN W/O DYE: CPT

## 2022-04-30 PROCEDURE — 80069 RENAL FUNCTION PANEL: CPT | Performed by: INTERNAL MEDICINE

## 2022-04-30 PROCEDURE — 36430 TRANSFUSION BLD/BLD COMPNT: CPT

## 2022-04-30 PROCEDURE — 80061 LIPID PANEL: CPT | Performed by: PSYCHIATRY & NEUROLOGY

## 2022-04-30 PROCEDURE — 85384 FIBRINOGEN ACTIVITY: CPT | Performed by: PSYCHIATRY & NEUROLOGY

## 2022-04-30 PROCEDURE — 82962 GLUCOSE BLOOD TEST: CPT

## 2022-04-30 PROCEDURE — 25010000002 VANCOMYCIN PER 500 MG: Performed by: HOSPITALIST

## 2022-04-30 PROCEDURE — 99233 SBSQ HOSP IP/OBS HIGH 50: CPT | Performed by: PSYCHIATRY & NEUROLOGY

## 2022-04-30 PROCEDURE — 80202 ASSAY OF VANCOMYCIN: CPT | Performed by: HOSPITALIST

## 2022-04-30 PROCEDURE — 99232 SBSQ HOSP IP/OBS MODERATE 35: CPT | Performed by: INTERNAL MEDICINE

## 2022-04-30 PROCEDURE — P9012 CRYOPRECIPITATE EACH UNIT: HCPCS

## 2022-04-30 PROCEDURE — 25010000002 HYDRALAZINE PER 20 MG: Performed by: INTERNAL MEDICINE

## 2022-04-30 RX ORDER — ACETAMINOPHEN 650 MG/1
650 SUPPOSITORY RECTAL EVERY 4 HOURS PRN
Status: DISCONTINUED | OUTPATIENT
Start: 2022-04-30 | End: 2022-05-13 | Stop reason: HOSPADM

## 2022-04-30 RX ORDER — VANCOMYCIN HYDROCHLORIDE 1 G/200ML
20 INJECTION, SOLUTION INTRAVENOUS ONCE
Status: DISCONTINUED | OUTPATIENT
Start: 2022-04-30 | End: 2022-04-30

## 2022-04-30 RX ORDER — PANTOPRAZOLE SODIUM 40 MG/10ML
40 INJECTION, POWDER, LYOPHILIZED, FOR SOLUTION INTRAVENOUS
Status: DISCONTINUED | OUTPATIENT
Start: 2022-05-01 | End: 2022-05-12

## 2022-04-30 RX ORDER — DEXMEDETOMIDINE HYDROCHLORIDE 4 UG/ML
.2-1.5 INJECTION INTRAVENOUS
Status: DISCONTINUED | OUTPATIENT
Start: 2022-04-30 | End: 2022-05-03

## 2022-04-30 RX ORDER — HEPARIN SODIUM 1000 [USP'U]/ML
3000 INJECTION, SOLUTION INTRAVENOUS; SUBCUTANEOUS ONCE
Status: COMPLETED | OUTPATIENT
Start: 2022-04-30 | End: 2022-05-13

## 2022-04-30 RX ADMIN — Medication 10 ML: at 20:47

## 2022-04-30 RX ADMIN — ACETAMINOPHEN 650 MG: 650 SUPPOSITORY RECTAL at 07:27

## 2022-04-30 RX ADMIN — VANCOMYCIN HYDROCHLORIDE 500 MG: 500 INJECTION, POWDER, LYOPHILIZED, FOR SOLUTION INTRAVENOUS at 13:22

## 2022-04-30 RX ADMIN — DEXMEDETOMIDINE HYDROCHLORIDE 0.2 MCG/KG/HR: 4 INJECTION INTRAVENOUS at 08:44

## 2022-04-30 RX ADMIN — TRANEXAMIC ACID 1000 MG: 100 INJECTION INTRAVENOUS at 00:10

## 2022-04-30 RX ADMIN — HYDRALAZINE HYDROCHLORIDE 10 MG: 20 INJECTION INTRAMUSCULAR; INTRAVENOUS at 20:50

## 2022-04-30 RX ADMIN — NICARDIPINE HYDROCHLORIDE 5 MG/HR: 25 INJECTION, SOLUTION INTRAVENOUS at 15:14

## 2022-04-30 RX ADMIN — HYDRALAZINE HYDROCHLORIDE 10 MG: 20 INJECTION INTRAMUSCULAR; INTRAVENOUS at 14:57

## 2022-04-30 RX ADMIN — NICARDIPINE HYDROCHLORIDE 5 MG/HR: 25 INJECTION, SOLUTION INTRAVENOUS at 00:46

## 2022-05-01 ENCOUNTER — APPOINTMENT (OUTPATIENT)
Dept: GENERAL RADIOLOGY | Facility: HOSPITAL | Age: 57
End: 2022-05-01

## 2022-05-01 ENCOUNTER — APPOINTMENT (OUTPATIENT)
Dept: CT IMAGING | Facility: HOSPITAL | Age: 57
End: 2022-05-01

## 2022-05-01 PROBLEM — I63.9 ACUTE CVA (CEREBROVASCULAR ACCIDENT): Status: ACTIVE | Noted: 2022-05-01

## 2022-05-01 PROBLEM — I62.9 INTRACRANIAL HEMORRHAGE: Status: ACTIVE | Noted: 2022-05-01

## 2022-05-01 LAB
ANION GAP SERPL CALCULATED.3IONS-SCNC: 14.7 MMOL/L (ref 5–15)
BUN SERPL-MCNC: 41 MG/DL (ref 6–20)
BUN/CREAT SERPL: 6.6 (ref 7–25)
CALCIUM SPEC-SCNC: 8 MG/DL (ref 8.6–10.5)
CHLORIDE SERPL-SCNC: 96 MMOL/L (ref 98–107)
CO2 SERPL-SCNC: 21.3 MMOL/L (ref 22–29)
CREAT SERPL-MCNC: 6.22 MG/DL (ref 0.57–1)
DEPRECATED RDW RBC AUTO: 54.3 FL (ref 37–54)
EGFRCR SERPLBLD CKD-EPI 2021: 7.4 ML/MIN/1.73
ERYTHROCYTE [DISTWIDTH] IN BLOOD BY AUTOMATED COUNT: 18.3 % (ref 12.3–15.4)
GLUCOSE BLDC GLUCOMTR-MCNC: 108 MG/DL (ref 70–130)
GLUCOSE BLDC GLUCOMTR-MCNC: 123 MG/DL (ref 70–130)
GLUCOSE BLDC GLUCOMTR-MCNC: 155 MG/DL (ref 70–130)
GLUCOSE BLDC GLUCOMTR-MCNC: 178 MG/DL (ref 70–130)
GLUCOSE BLDC GLUCOMTR-MCNC: 53 MG/DL (ref 70–130)
GLUCOSE BLDC GLUCOMTR-MCNC: 62 MG/DL (ref 70–130)
GLUCOSE SERPL-MCNC: 99 MG/DL (ref 65–99)
HCT VFR BLD AUTO: 29.6 % (ref 34–46.6)
HGB BLD-MCNC: 10.1 G/DL (ref 12–15.9)
MCH RBC QN AUTO: 28.1 PG (ref 26.6–33)
MCHC RBC AUTO-ENTMCNC: 34.1 G/DL (ref 31.5–35.7)
MCV RBC AUTO: 82.5 FL (ref 79–97)
PLATELET # BLD AUTO: 172 10*3/MM3 (ref 140–450)
PMV BLD AUTO: 9.7 FL (ref 6–12)
POTASSIUM SERPL-SCNC: 4.6 MMOL/L (ref 3.5–5.2)
RBC # BLD AUTO: 3.59 10*6/MM3 (ref 3.77–5.28)
SODIUM SERPL-SCNC: 132 MMOL/L (ref 136–145)
VANCOMYCIN SERPL-MCNC: 16.8 MCG/ML (ref 5–40)
WBC NRBC COR # BLD: 12.18 10*3/MM3 (ref 3.4–10.8)

## 2022-05-01 PROCEDURE — 85027 COMPLETE CBC AUTOMATED: CPT | Performed by: INTERNAL MEDICINE

## 2022-05-01 PROCEDURE — 80202 ASSAY OF VANCOMYCIN: CPT | Performed by: HOSPITALIST

## 2022-05-01 PROCEDURE — 02HV33Z INSERTION OF INFUSION DEVICE INTO SUPERIOR VENA CAVA, PERCUTANEOUS APPROACH: ICD-10-PCS | Performed by: SURGERY

## 2022-05-01 PROCEDURE — 82962 GLUCOSE BLOOD TEST: CPT

## 2022-05-01 PROCEDURE — 80048 BASIC METABOLIC PNL TOTAL CA: CPT | Performed by: INTERNAL MEDICINE

## 2022-05-01 PROCEDURE — 25010000002 VANCOMYCIN PER 500 MG: Performed by: HOSPITALIST

## 2022-05-01 PROCEDURE — 70450 CT HEAD/BRAIN W/O DYE: CPT

## 2022-05-01 PROCEDURE — 74018 RADEX ABDOMEN 1 VIEW: CPT

## 2022-05-01 PROCEDURE — 99231 SBSQ HOSP IP/OBS SF/LOW 25: CPT | Performed by: NEUROLOGICAL SURGERY

## 2022-05-01 PROCEDURE — 99233 SBSQ HOSP IP/OBS HIGH 50: CPT | Performed by: PSYCHIATRY & NEUROLOGY

## 2022-05-01 RX ORDER — ALBUMIN (HUMAN) 12.5 G/50ML
12.5 SOLUTION INTRAVENOUS AS NEEDED
Status: ACTIVE | OUTPATIENT
Start: 2022-05-01 | End: 2022-05-02

## 2022-05-01 RX ORDER — AMLODIPINE BESYLATE 5 MG/1
5 TABLET ORAL
Status: DISCONTINUED | OUTPATIENT
Start: 2022-05-01 | End: 2022-05-02

## 2022-05-01 RX ADMIN — DEXTROSE MONOHYDRATE 25 G: 25 INJECTION, SOLUTION INTRAVENOUS at 07:01

## 2022-05-01 RX ADMIN — DEXMEDETOMIDINE HYDROCHLORIDE 0.6 MCG/KG/HR: 4 INJECTION INTRAVENOUS at 21:10

## 2022-05-01 RX ADMIN — OLANZAPINE 5 MG: 5 TABLET ORAL at 21:28

## 2022-05-01 RX ADMIN — DEXTROSE MONOHYDRATE 25 G: 25 INJECTION, SOLUTION INTRAVENOUS at 00:12

## 2022-05-01 RX ADMIN — Medication 10 ML: at 09:00

## 2022-05-01 RX ADMIN — PANTOPRAZOLE SODIUM 40 MG: 40 INJECTION, POWDER, FOR SOLUTION INTRAVENOUS at 06:34

## 2022-05-01 RX ADMIN — NICARDIPINE HYDROCHLORIDE 5 MG/HR: 25 INJECTION, SOLUTION INTRAVENOUS at 21:14

## 2022-05-01 RX ADMIN — Medication 10 ML: at 21:28

## 2022-05-01 RX ADMIN — AMLODIPINE BESYLATE 5 MG: 5 TABLET ORAL at 16:41

## 2022-05-01 RX ADMIN — NICARDIPINE HYDROCHLORIDE 2.5 MG/HR: 25 INJECTION, SOLUTION INTRAVENOUS at 16:18

## 2022-05-01 RX ADMIN — DEXMEDETOMIDINE HYDROCHLORIDE 0.6 MCG/KG/HR: 4 INJECTION INTRAVENOUS at 05:14

## 2022-05-01 RX ADMIN — ROPINIROLE HYDROCHLORIDE 0.75 MG: 0.5 TABLET, FILM COATED ORAL at 21:28

## 2022-05-01 RX ADMIN — NICARDIPINE HYDROCHLORIDE 5 MG/HR: 25 INJECTION, SOLUTION INTRAVENOUS at 10:52

## 2022-05-01 RX ADMIN — VANCOMYCIN HYDROCHLORIDE 500 MG: 500 INJECTION, POWDER, LYOPHILIZED, FOR SOLUTION INTRAVENOUS at 16:41

## 2022-05-01 RX ADMIN — ATORVASTATIN CALCIUM 40 MG: 20 TABLET, FILM COATED ORAL at 21:28

## 2022-05-01 RX ADMIN — NICARDIPINE HYDROCHLORIDE 5 MG/HR: 25 INJECTION, SOLUTION INTRAVENOUS at 03:19

## 2022-05-01 NOTE — PROGRESS NOTES
"DOS: 2022  NAME: Zaina Martinez   : 1965  PCP: Karson Oreilly MD  Chief Complaint   Patient presents with   • Altered Mental Status     PT WITH AMS, LAST SEEN NORMAL APPROX. 0800 TODAY; PT NOT TALKING, LEFT  WEAKNESS; PT IS ON DIALYSIS BUT HAS MISSED HER LAST APPOINTMENT; PT WEARING FACE MASK       Chief complaint: Stroke  Subjective: Remains neurologically stable.  More encephalopathic.    Objective:  Vital signs: /67 (BP Location: Right arm, Patient Position: Lying)   Pulse 80   Temp 98.3 °F (36.8 °C) (Oral)   Resp 20   Ht 157.5 cm (62.01\")   Wt 57.8 kg (127 lb 6.8 oz)   SpO2 98%   BMI 23.30 kg/m²    Gen: NAD, vitals reviewed  MS: Awake, inattentive, language intact, follows commands intermittently, no neglect  CN: Blinks to threat bilaterally, pupils 3 mm, reactive, conjugate gaze, no facial asymmetry  Motor: Moving all 4 extremities, normal tone throughout  Sensory: intact to light touch all 4 ext.    ROS:  Unable to obtain due to encephalopathy      Laboratory results:  Lab Results   Component Value Date    GLUCOSE 94 2022    CALCIUM 8.0 (L) 2022     (L) 2022    K 4.9 2022    CO2 22.0 2022    CL 93 (L) 2022    BUN 36 (H) 2022    CREATININE 4.89 (H) 2022    EGFRIFAFRI  2022      Comment:      <15 Indicative of kidney failure.    EGFRIFNONA 14 (L) 2022    BCR 7.4 2022    ANIONGAP 15.0 2022     Lab Results   Component Value Date    WBC 17.03 (H) 2022    HGB 11.0 (L) 2022    HCT 31.9 (L) 2022    MCV 82.2 2022     2022     Lab Results   Component Value Date    LDL 80 2022         Lab 22  1103   HEMOGLOBIN A1C 5.80*        Review of labs: Morning labs pending    Review and interpretation of imaging: I personally reviewed her follow-up head CT performed this morning which shows stable left subcortical intracerebral hemorrhage without any significant change, " diffuse cerebral edema.  Radiology report reviewed.  I also reviewed her MRI brain performed yesterday which amazingly does not show any right MCA stroke.    2D echo shows no evidence of vegetation    Diagnoses:  Spontaneous left subcortical intracerebral hemorrhage  Received TNK  End-stage renal disease  Infected dialysis catheter    Comment: Fortunately she is improving neurologically in the sense that she has no persistent focal signs of stroke.  She is encephalopathic which is probably multifactorial from her hemorrhage and her time between dialysis sessions    Plan:  1.  Okay for HD from a neurology standpoint  2.  Continue to avoid aspirin/anticoagulation for now  3.  No further follow-up head imaging unless there is a clinical change  4.  Keep in ICU today    Discussed with Dr. Randall, nursing staff

## 2022-05-01 NOTE — NURSING NOTE
Patient stable and tolerated hd tx.  Patient was able to remove 1L.  Replaced heparin in lumen.  Catheter flowed well and positional.        Dinesh Ordoñez RN  [facility]

## 2022-05-01 NOTE — PROGRESS NOTES
"                                              LOS: 4 days   Patient Care Team:  Karson Oreilly MD as PCP - General (Family Medicine)    Chief Complaint:  F/up stroke, intracranial hemorrhage and critical care management    Subjective   Interval History      Blood pressure remains elevated.  On nicardipine.  Afebrile.  She does not follow commands.  She does move her extremities spontaneously and her neuro assessment has not changed since yesterday according to the staff.  She intermittently talks but she is mostly nonverbal      REVIEW OF SYSTEMS:   Cannot obtain due to altered mental status    Ventilator/Non-Invasive Ventilation Settings (From admission, onward)            None                Physical Exam:     Vital Signs  Temp:  [98.1 °F (36.7 °C)-99.7 °F (37.6 °C)] 98.3 °F (36.8 °C)  Heart Rate:  [47-93] 74  BP: ()/() 136/67  No intake or output data in the 24 hours ending 05/01/22 1142  Flowsheet Rows    Flowsheet Row First Filed Value   Admission Height 157.5 cm (62\") Documented at 04/28/2022 0427   Admission Weight 54.8 kg (120 lb 13 oz) Documented at 04/28/2022 0427          PPE used per hospital policy    General Appearance:   Alert, cooperative.  In no distress.   ENMT:  Gibson 4.  Moist tongue   Eyes:  Pupils equal and reactive to light. EOMI   Neck:    Trachea midline. No thyromegaly.   Lungs:    Clear to auscultation,respirations regular, even and nonlabored    Heart:   Regular rhythm and normal rate, normal S1 and S2, no         murmur   Skin:   No rash or ecchymosis   Abdomen:     Soft. No tenderness. No HSM.   Neuro/psych:  Conscious, alert.  Moves all extremities spontaneously but seems to be weaker in the left arm.  She withdraw to pain.  Does not follow commands.  Speech: Echolalia and receptive aphasia.   Extremities:  No cyanosis, clubbing or edema.  Warm extremities and well-perfused          Results Review:        Results from last 7 days   Lab Units 05/01/22  1050 " 04/30/22  0327 04/29/22  0332   SODIUM mmol/L 132* 130* 132*   POTASSIUM mmol/L 4.6 4.9 4.3   CHLORIDE mmol/L 96* 93* 95*   CO2 mmol/L 21.3* 22.0 26.2   BUN mg/dL 41* 36* 32*   CREATININE mg/dL 6.22* 4.89* 4.12*   GLUCOSE mg/dL 99 94 179*   CALCIUM mg/dL 8.0* 8.0* 8.4*         Results from last 7 days   Lab Units 05/01/22  1050 04/29/22  2317 04/29/22  0332   WBC 10*3/mm3 12.18* 17.03* 14.34*   HEMOGLOBIN g/dL 10.1* 11.0* 10.5*   HEMATOCRIT % 29.6* 31.9* 31.9*   PLATELETS 10*3/mm3 172 150 128*     Results from last 7 days   Lab Units 04/29/22  2317 04/27/22  1615   INR  0.97 1.02   APTT seconds 29.4 28.2                       Results from last 7 days   Lab Units 04/27/22  1648   PH, ARTERIAL pH units 7.395   PCO2, ARTERIAL mm Hg 44.0   PO2 ART mm Hg 52.8*   MODALITY  Room Air   O2 SATURATION CALC % 86.6*         I reviewed the patient's new clinical results.        Medication Review:   atorvastatin, 40 mg, Oral, Nightly  heparin (porcine), 3,000 Units, Intracatheter, Once  insulin lispro, 0-7 Units, Subcutaneous, 4x Daily With Meals & Nightly  levothyroxine, 125 mcg, Oral, Q AM  OLANZapine, 5 mg, Oral, BID  pantoprazole, 40 mg, Intravenous, Q AM  rOPINIRole, 0.75 mg, Oral, Nightly  sertraline, 100 mg, Oral, Daily  sodium chloride, 10 mL, Intravenous, Q12H  tamsulosin, 0.4 mg, Oral, Daily  vancomycin, 500 mg, Intravenous, Once  Vancomycin Pharmacy Intermittent Dosing, , Does not apply, Daily        dexmedetomidine, 0.2-1.5 mcg/kg/hr, Last Rate: Stopped (05/01/22 0540)  niCARdipine, 5-15 mg/hr, Last Rate: 5 mg/hr (05/01/22 1052)  Pharmacy to dose vancomycin,         Diagnostic imaging:  I personally and independently reviewed the following images:  CT brain 4/30/2022 compared to 4/29/2022: Slight enlargement in the left frontal hemorrhage.  No midline shift or mass-effect      CXR 5/1/2022: Increased pulmonary vascular markings.  Dialysis catheter in good position.      CT head 5/1/2022: No change in intracranial  hemorrhage.    Assessment     1. Right MCA stroke, likely embolic, s/p TNKase  2. Spontaneous left frontal intracerebral hemorrhage  3. ESRD on HD TTS  4. Hemodialysis cath infection with MRSA, removed.  New [place] catheter and right IJ inserted 5/1  5. Sepsis  6. Hyponatremia, clinically significant  7. Encephalopathy, metabolic  8. Pulmonary hypertension on echo 4/29/2022, RVSP 48.  Moderate to severely enlarged LA.  Grade 2 diastolic dysfunction.  9. Pulmonary vascular congestion/fluid overload  10. Mild MR    Pertinent medical problems:  · DM type II  · CAD  · Anemia of CKD  · HTN        Plan     · ICU care.  Neuro check  · Serial CT brain images. Neurosurgery following for ICH.  · 2D echo to evaluate for endocarditis  · Antibiotics with vancomycin.  Repeat blood culture x2 4/28 negative so far.  Echo showed no evidence of endocarditis.  fever resolved  · Dialysis today.  Ultrafiltration due to brain bleed.  Discussed with nephrology.  There is point fluid overload noted on imaging but no respiratory distress or need for oxygen.  · Insert core track.  Switch IV meds to p.o.  · Nicardipine drip to keep SBP <150.  Start amlodipine 5 mg  · Precedex.  Titrate to wean off.        Alex Randall MD  05/01/22  11:42 EDT      Time: Critical care 35 min      This note was dictated utilizing Dragon dictation

## 2022-05-01 NOTE — PROGRESS NOTES
"[facility] Clinical Pharmacy Services: Vancomycin Monitoring Note    Zaina Martinez is a 56 y.o. female who is on day 3/7 of pharmacy to dose vancomycin for ssti per Dr. Soriano' request. Dr. Canas is following for sepsis and HD catheter-related infection w/ MRSA.    Previous Vancomycin Dose: intermittent around HD  Updated Cultures and Sensitivities: CTC:   >15 cfu/ml MRSA, bc ng at 24h, uc <10k mrsa    Results from last 7 days   Lab Units 05/01/22  0329 04/30/22  0327   VANCOMYCIN RM mcg/mL 16.80 12.70     Vitals/Labs  Ht: 157.5 cm (62.01\"); Wt: 57.8 kg (127 lb 6.8 oz)   Temp Readings from Last 1 Encounters:   05/01/22 98.3 °F (36.8 °C) (Oral)     Estimated Creatinine Clearance: 11.7 mL/min (A) (by C-G formula based on SCr of 4.89 mg/dL (H)).   Hemodialysis: Tu-Th-Sat outpt    Results from last 7 days   Lab Units 04/30/22  0327 04/29/22  2317 04/29/22  0332 04/28/22  0434   CREATININE mg/dL 4.89*  --  4.12* 3.06*   WBC 10*3/mm3  --  17.03* 14.34* 13.49*     Assessment/Plan    -HD catheter placed today (non-tunneled) and HD planned x 3 hours this afternoon. AM random vanc level is 16.8 mcg/ml. Admninistered 500 mg iv vancomycin 4/30 at 1322.     Current Vancomycin Dose: intermittent due to ESRD/HD. Redose after HD with 500 mg iv  Next Level Date and Time: Vanc Random on 5/2 w/ am labs.  We will continue to monitor patient changes and renal function     Thank you for involving pharmacy in this patient's care. Please contact pharmacy with any questions or concerns.       John Hamilton, [facility]  Clinical Pharmacist            "

## 2022-05-01 NOTE — PROCEDURES
"Insert nontunneled hemodialysis catheter at bedside    Date/Time: 5/1/2022 9:16 AM  Performed by: Darien Tello MD  Authorized by: Darien Tello MD   Consent: Verbal consent not obtained. Written consent obtained.  Risks and benefits: risks, benefits and alternatives were discussed  Consent given by: spouse  Required items: required blood products, implants, devices, and special equipment available  Patient identity confirmed: arm band and provided demographic data  Time out: Immediately prior to procedure a \"time out\" was called to verify the correct patient, procedure, equipment, support staff and site/side marked as required.  Indications: vascular access  Anesthesia: local infiltration    Anesthesia:  Local Anesthetic: lidocaine 1% without epinephrine  Anesthetic total: 5 mL    Sedation:  Patient sedated: no    Preparation: skin prepped with 2% chlorhexidine and skin prepped with ChloraPrep  Sterile barriers: all five maximum sterile barriers used - cap, mask, sterile gown, sterile gloves, and large sterile sheet  Hand hygiene: hand hygiene performed prior to central venous catheter insertion  Location details: right internal jugular  Patient position: flat  Catheter type: triple lumen  Catheter size: 12 Fr  Pre-procedure: landmarks identified  Ultrasound guidance: yes  Sterile ultrasound techniques: sterile gel and sterile probe covers were used  Number of attempts: 1  Successful placement: yes  Post-procedure: line sutured and dressing applied  Assessment: blood return through all ports and free fluid flow  Patient tolerance: patient tolerated the procedure well with no immediate complications        "

## 2022-05-01 NOTE — PROGRESS NOTES
NEUROSURGERY PROGRESS NOTE     LOS: 4 days   Patient Care Team:  Karson Oreilly MD as PCP - General (Family Medicine)    Chief Complaint: Confusion    Subjective     Interval History:     No significant changes reported overnight.  The patient remains nonverbal for me.    While in the room and during my examination of the patient I wore a mask and eye protection.  I washed my hands before and after this patient encounter.  The patient was also wearing a mask.     History taken from: chart    Objective      Vital Signs  Temp:  [98.1 °F (36.7 °C)-99.7 °F (37.6 °C)] 98.3 °F (36.8 °C)  Heart Rate:  [47-93] 78  BP: ()/() 132/95  Body mass index is 23.3 kg/m².    Intake/Output last 3 shifts:  I/O last 3 completed shifts:  In: 570.7 [I.V.:314.7; Blood:256]  Out: -     Intake/Output this shift:  No intake/output data recorded.    Physical    Patient looking around the room anxiously  Moves all 4 extremities and withdraws to irritation  Pupils 3 mm and symmetrically reactive    Results Review:     I reviewed the patient's new clinical results.  I reviewed the patient's new imaging results and agree with the interpretation.    Labs:    Lab Results (last 24 hours)     Procedure Component Value Units Date/Time    POC Glucose Once [[id number]]  (Normal) Collected: 04/30/22 1308    Specimen: Blood Updated: 04/30/22 1309     Glucose 80 mg/dL      Comment: Meter: [id number] : [id number] Chon Fernández RN       POC Glucose Once [[id number]]  (Normal) Collected: 04/30/22 1813    Specimen: Blood Updated: 04/30/22 1814     Glucose 74 mg/dL      Comment: Meter: [id number] : [id number] Chon Fernández RN       POC Glucose Once [[id number]]  (Abnormal) Collected: 05/01/22 0009    Specimen: Blood Updated: 05/01/22 0010     Glucose 53 mg/dL      Comment: Meter: [id number] : [id number] Emilie SAAVEDRA       POC Glucose Once [[id number]]  (Abnormal) Collected: 05/01/22 0033    Specimen: Blood Updated: 05/01/22 0034      Glucose 155 mg/dL      Comment: Meter: [id number] : [id number] Karon Cervantes RN       Vancomycin, Random [[id number]]  (Normal) Collected: 05/01/22 0329    Specimen: Blood Updated: 05/01/22 0444     Vancomycin Random 16.80 mcg/mL     Narrative:      Therapeutic Ranges for Vancomycin    Vancomycin Random   5.0-40.0 mcg/mL  Vancomycin Trough   5.0-20.0 mcg/mL  Vancomycin Peak     20.0-40.0 mcg/mL    POC Glucose Once [[id number]]  (Abnormal) Collected: 05/01/22 0658    Specimen: Blood Updated: 05/01/22 0659     Glucose 62 mg/dL      Comment: Meter: [id number] : [id number] Karon Cervantes RN       POC Glucose Once [[id number]]  (Abnormal) Collected: 05/01/22 0724    Specimen: Blood Updated: 05/01/22 0726     Glucose 178 mg/dL      Comment: Meter: [id number] : [id number] Karon Cervantes RN             Imaging:    I personally reviewed the images from the following radiographic studies.    CT scan of the head done this morning reveals stable left frontal intracranial hemorrhage without significant mass-effect    MRI of the brain done yesterday reveals a focal area of acute to subacute intracerebral hemorrhage within the left frontal lobe near the vertex.  At that time the hemorrhage also remained stable as compared to a prior CT from earlier in the day.  There is small vessel chronic ischemic changes in the periventricular white matter.    Current Medications:   Scheduled Meds:atorvastatin, 40 mg, Oral, Nightly  heparin (porcine), 3,000 Units, Intracatheter, Once  insulin lispro, 0-7 Units, Subcutaneous, 4x Daily With Meals & Nightly  levothyroxine, 125 mcg, Oral, Q AM  OLANZapine, 5 mg, Oral, BID  pantoprazole, 40 mg, Intravenous, Q AM  rOPINIRole, 0.75 mg, Oral, Nightly  sertraline, 100 mg, Oral, Daily  sodium chloride, 10 mL, Intravenous, Q12H  tamsulosin, 0.4 mg, Oral, Daily  Vancomycin Pharmacy Intermittent Dosing, , Does not apply, Daily      Continuous Infusions:dexmedetomidine, 0.2-1.5 mcg/kg/hr, Last  Rate: Stopped (05/01/22 8618)  niCARdipine, 5-15 mg/hr, Last Rate: 5 mg/hr (05/01/22 0319)  Pharmacy to dose vancomycin,         Assessment/Plan       Encephalopathy, toxic    Hypertensive disorder    Acute DM type 2 causing complication (HCC)    ESRD (end stage renal disease) (HCC)    CAD (coronary artery disease)    Acute on chronic diastolic CHF (congestive heart failure) (HCC)    Leukocytosis    Acute metabolic encephalopathy      Plan: From the neurosurgical perspective the interim cranial hemorrhage is stable.  There does not appear to be any underlying lesion based on the MRI imaging.  Hemorrhage is likely due to the thrombolytic therapy which in the absence of thrombolysis should resolve.  No role for neurosurgical intervention.  If she requires low-dose anticoagulation or antiplatelet agents that can be offered at this time but it is still premature to consider any full anticoagulation if necessary (generally we would like to see documented stability or interval resolution of the ICH in 10 to 14 days before considering any full anticoagulation).  Her neurologic exam is likely related to metabolic encephalopathy.      We will sign off but remain available for questions, as always.      Aureliano Duffy MD  05/01/22  09:30 EDT

## 2022-05-01 NOTE — PROGRESS NOTES
"DAILY PROGRESS NOTE  [facility]    Patient Identification:  Name: Zaina Martinez  Age: 56 y.o.  Sex: female  :  1965  MRN: [id number]         Primary Care Physician: Karson Oreilly MD    Subjective:  Interval History: She is weak.  Confused and requiring restraints.    Objective:    Scheduled Meds:amLODIPine, 5 mg, Oral, Q24H  atorvastatin, 40 mg, Oral, Nightly  heparin (porcine), 3,000 Units, Intracatheter, Once  insulin lispro, 0-7 Units, Subcutaneous, 4x Daily With Meals & Nightly  levothyroxine, 125 mcg, Oral, Q AM  OLANZapine, 5 mg, Oral, BID  pantoprazole, 40 mg, Intravenous, Q AM  rOPINIRole, 0.75 mg, Oral, Nightly  sertraline, 100 mg, Oral, Daily  sodium chloride, 10 mL, Intravenous, Q12H  tamsulosin, 0.4 mg, Oral, Daily  Vancomycin Pharmacy Intermittent Dosing, , Does not apply, Daily      Continuous Infusions:dexmedetomidine, 0.2-1.5 mcg/kg/hr, Last Rate: 0.2 mcg/kg/hr (22 0700)  niCARdipine, 5-15 mg/hr, Last Rate: 2.5 mg/hr (22 1618)  Pharmacy to dose vancomycin,         Vital signs in last 24 hours:  Temp:  [97.5 °F (36.4 °C)-99.7 °F (37.6 °C)] 97.5 °F (36.4 °C)  Heart Rate:  [53-93] 78  BP: (113-171)/() 131/80    Intake/Output:  No intake or output data in the 24 hours ending 22 1720    Exam:  /80   Pulse 78   Temp 97.5 °F (36.4 °C) (Oral)   Resp 20   Ht 157.5 cm (62.01\")   Wt 57.8 kg (127 lb 6.8 oz)   SpO2 96%   BMI 23.30 kg/m²     General Appearance:    Alert, cooperative, no distress   Head:    Normocephalic, without obvious abnormality, atraumatic   Eyes:       Throat:   Lips, tongue, gums normal   Neck:   Supple, symmetrical, trachea midline, no JVD   Lungs:     Clear to auscultation bilaterally, respirations unlabored   Chest Wall:    No tenderness or deformity    Heart:    Regular rate and rhythm, S1 and S2 normal, no murmur,no  Rub or gallop   Abdomen:     Soft, nontender, bowel sounds active, no masses, no organomegaly  "   Extremities:   Extremities normal, atraumatic, no cyanosis or edema   Pulses:      Skin:   Skin is warm and dry,  no rashes or palpable lesions   Neurologic:  She is weak from stroke and not talking much.  confused      Lab Results (last 72 hours)     Procedure Component Value Units Date/Time    POC Glucose Once [[id number]]  (Normal) Collected: 04/30/22 1308    Specimen: Blood Updated: 04/30/22 1309     Glucose 80 mg/dL      Comment: Meter: [id number] : [id number] Chon Fernández RN       Blood Culture - Blood, Hand, Left [[id number]]  (Normal) Collected: 04/28/22 1113    Specimen: Blood from Hand, Left Updated: 04/30/22 1131     Blood Culture No growth at 2 days    Blood Culture - Blood, Arm, Right [[id number]]  (Normal) Collected: 04/28/22 1102    Specimen: Blood from Arm, Right Updated: 04/30/22 1130     Blood Culture No growth at 2 days    POC Glucose Once [[id number]]  (Normal) Collected: 04/30/22 0622    Specimen: Blood Updated: 04/30/22 0623     Glucose 87 mg/dL      Comment: Meter: [id number] : [id number] Phan Radha NA       Catheter Culture - Cath Tip, Neck [[id number]]  (Abnormal)  (Susceptibility) Collected: 04/28/22 1438    Specimen: Cath Tip from Neck Updated: 04/30/22 0619     CATHETER CULTURE >15 CFU/mL - Indicative of infection. Staphylococcus aureus, MRSA     Comment: Methicillin resistant Staphylococcus aureus, Patient may be an isolation risk.       Susceptibility      Staphylococcus aureus, MRSA      ARSENIO      Gentamicin Susceptible      Oxacillin Resistant     Rifampin Susceptible      Vancomycin Susceptible                    Linear View               Susceptibility Comments     Staphylococcus aureus, MRSA    This isolate does not demonstrate inducible clindamycin resistance in vitro.               Renal Function Panel [[id number]]  (Abnormal) Collected: 04/30/22 0327    Specimen: Blood Updated: 04/30/22 0415     Glucose 94 mg/dL      BUN 36 mg/dL      Creatinine 4.89 mg/dL      Sodium 130  mmol/L      Potassium 4.9 mmol/L      Chloride 93 mmol/L      CO2 22.0 mmol/L      Calcium 8.0 mg/dL      Albumin 3.20 g/dL      Phosphorus 3.8 mg/dL      Anion Gap 15.0 mmol/L      BUN/Creatinine Ratio 7.4     eGFR 9.9 mL/min/1.73      Comment: <15 Indicative of kidney failure       Narrative:      GFR Normal >60  Chronic Kidney Disease <60  Kidney Failure <15      Vancomycin, Random [[id number]]  (Normal) Collected: 04/30/22 0327    Specimen: Blood Updated: 04/30/22 0415     Vancomycin Random 12.70 mcg/mL     Narrative:      Therapeutic Ranges for Vancomycin    Vancomycin Random   5.0-40.0 mcg/mL  Vancomycin Trough   5.0-20.0 mcg/mL  Vancomycin Peak     20.0-40.0 mcg/mL    Lipid Panel [[id number]] Collected: 04/30/22 0327    Specimen: Blood Updated: 04/30/22 0415     Total Cholesterol 145 mg/dL      Triglycerides 119 mg/dL      HDL Cholesterol 44 mg/dL      LDL Cholesterol  80 mg/dL      VLDL Cholesterol 21 mg/dL      LDL/HDL Ratio 1.75    Narrative:      Cholesterol Reference Ranges  (U.S. Department of Health and Human Services ATP III Classifications)    Desirable          <200 mg/dL  Borderline High    200-239 mg/dL  High Risk          >240 mg/dL      Triglyceride Reference Ranges  (U.S. Department of Health and Human Services ATP III Classifications)    Normal           <150 mg/dL  Borderline High  150-199 mg/dL  High             200-499 mg/dL  Very High        >500 mg/dL    HDL Reference Ranges  (U.S. Department of Health and Human Services ATP III Classifications)    Low     <40 mg/dl (major risk factor for CHD)  High    >60 mg/dl ('negative' risk factor for CHD)        LDL Reference Ranges  (U.S. Department of Health and Human Services ATP III Classifications)    Optimal          <100 mg/dL  Near Optimal     100-129 mg/dL  Borderline High  130-159 mg/dL  High             160-189 mg/dL  Very High        >189 mg/dL    Fibrinogen [[id number]]  (Abnormal) Collected: 04/30/22 0327    Specimen: Blood Updated:  04/30/22 0412     Fibrinogen 494 mg/dL     POC Glucose Once [[id number]]  (Abnormal) Collected: 04/30/22 0018    Specimen: Blood Updated: 04/30/22 0019     Glucose 133 mg/dL      Comment: Meter: [id number] : [id number] Emilie SAAVEDRA       Fibrinogen [[id number]]  (Normal) Collected: 04/29/22 2317    Specimen: Blood Updated: 04/29/22 2339     Fibrinogen 381 mg/dL     Protime-INR [[id number]]  (Normal) Collected: 04/29/22 2317    Specimen: Blood Updated: 04/29/22 2339     Protime 12.8 Seconds      INR 0.97    aPTT [[id number]]  (Normal) Collected: 04/29/22 2317    Specimen: Blood Updated: 04/29/22 2339     PTT 29.4 seconds     CBC & Differential [[id number]]  (Abnormal) Collected: 04/29/22 2317    Specimen: Blood Updated: 04/29/22 2325    Narrative:      The following orders were created for panel order CBC & Differential.  Procedure                               Abnormality         Status                     ---------                               -----------         ------                     CBC Auto Differential[[id number]]        Abnormal            Final result                 Please view results for these tests on the individual orders.    CBC Auto Differential [[id number]]  (Abnormal) Collected: 04/29/22 2317    Specimen: Blood Updated: 04/29/22 2325     WBC 17.03 10*3/mm3      RBC 3.88 10*6/mm3      Hemoglobin 11.0 g/dL      Hematocrit 31.9 %      MCV 82.2 fL      MCH 28.4 pg      MCHC 34.5 g/dL      RDW 18.4 %      RDW-SD 54.2 fl      MPV 10.4 fL      Platelets 150 10*3/mm3      Neutrophil % 86.7 %      Lymphocyte % 5.9 %      Monocyte % 6.3 %      Eosinophil % 0.2 %      Basophil % 0.3 %      Immature Grans % 0.6 %      Neutrophils, Absolute 14.78 10*3/mm3      Lymphocytes, Absolute 1.00 10*3/mm3      Monocytes, Absolute 1.07 10*3/mm3      Eosinophils, Absolute 0.03 10*3/mm3      Basophils, Absolute 0.05 10*3/mm3      Immature Grans, Absolute 0.10 10*3/mm3      nRBC 0.0 /100 WBC     POC Glucose Once  [[id number]]  (Normal) Collected: 04/29/22 2108    Specimen: Blood Updated: 04/29/22 2109     Glucose 79 mg/dL      Comment: Meter: [id number] : [id number] Karon Cervantes RN       POC Glucose Once [[id number]]  (Normal) Collected: 04/29/22 1641    Specimen: Blood Updated: 04/29/22 1642     Glucose 72 mg/dL      Comment: Meter: [id number] : [id number] Tyron Orneals RN       POC Glucose Once [[id number]]  (Normal) Collected: 04/29/22 1525    Specimen: Blood Updated: 04/29/22 1527     Glucose 74 mg/dL      Comment: Meter: [id number] : [id number] Tiago Navarro CNA       POC Glucose Once [[id number]]  (Abnormal) Collected: 04/29/22 1109    Specimen: Blood Updated: 04/29/22 1110     Glucose 170 mg/dL      Comment: Meter: [id number] : [id number] Tiago Navarro CNA       Urine Culture - Urine, Urine, Catheter [[id number]]  (Abnormal) Collected: 04/27/22 1712    Specimen: Urine, Catheter Updated: 04/29/22 0946     Urine Culture <10,000 CFU/mL Staphylococcus aureus, MRSA     Comment: Call if further workup needed.    Methicillin resistant Staph aureus, patient may be an isolation risk.  Staphylococcus aureus is not typically regarded as a uropathogen, except in cases with genitourinary instrumentation present.  It's presence suggests an alternate source (e.g. bloodstream, abscess, SSTI, etc.).  Please evaluate accordingly.       POC Glucose Once [[id number]]  (Normal) Collected: 04/29/22 0607    Specimen: Blood Updated: 04/29/22 0609     Glucose 129 mg/dL      Comment: Meter: [id number] : [id number] Hakeem Santacruz RN       Renal Function Panel [[id number]]  (Abnormal) Collected: 04/29/22 0332    Specimen: Blood Updated: 04/29/22 0445     Glucose 179 mg/dL      BUN 32 mg/dL      Creatinine 4.12 mg/dL      Sodium 132 mmol/L      Potassium 4.3 mmol/L      Chloride 95 mmol/L      CO2 26.2 mmol/L      Calcium 8.4 mg/dL      Albumin 2.90 g/dL      Phosphorus 3.7 mg/dL      Anion Gap 10.8 mmol/L      BUN/Creatinine Ratio 7.8      eGFR 12.1 mL/min/1.73      Comment: <15 Indicative of kidney failure       Narrative:      GFR Normal >60  Chronic Kidney Disease <60  Kidney Failure <15      CBC & Differential [[id number]]  (Abnormal) Collected: 04/29/22 0332    Specimen: Blood Updated: 04/29/22 0353    Narrative:      The following orders were created for panel order CBC & Differential.  Procedure                               Abnormality         Status                     ---------                               -----------         ------                     CBC Auto Differential[[id number]]        Abnormal            Final result                 Please view results for these tests on the individual orders.    CBC Auto Differential [[id number]]  (Abnormal) Collected: 04/29/22 0332    Specimen: Blood Updated: 04/29/22 0353     WBC 14.34 10*3/mm3      RBC 3.83 10*6/mm3      Hemoglobin 10.5 g/dL      Hematocrit 31.9 %      MCV 83.3 fL      MCH 27.4 pg      MCHC 32.9 g/dL      RDW 18.5 %      RDW-SD 55.5 fl      MPV 10.1 fL      Platelets 128 10*3/mm3      Neutrophil % 78.2 %      Lymphocyte % 12.0 %      Monocyte % 8.9 %      Eosinophil % 0.3 %      Basophil % 0.2 %      Neutrophils, Absolute 11.22 10*3/mm3      Lymphocytes, Absolute 1.72 10*3/mm3      Monocytes, Absolute 1.27 10*3/mm3      Eosinophils, Absolute 0.04 10*3/mm3      Basophils, Absolute 0.03 10*3/mm3     POC Glucose Once [[id number]]  (Abnormal) Collected: 04/28/22 2020    Specimen: Blood Updated: 04/28/22 2022     Glucose 242 mg/dL      Comment: Meter: [id number] : [id number] Cedric MCNULTY       POC Glucose Once [[id number]]  (Abnormal) Collected: 04/28/22 1612    Specimen: Blood Updated: 04/28/22 1614     Glucose 135 mg/dL      Comment: Meter: [id number] : [id number] Domo SAAVEDRA       Procalcitonin [[id number]]  (Normal) Collected: 04/28/22 0434    Specimen: Blood Updated: 04/28/22 1336     Procalcitonin 0.24 ng/mL     Narrative:      As a Marker for Sepsis  "(Non-Neonates):    1. <0.5 ng/mL represents a low risk of severe sepsis and/or septic shock.  2. >2 ng/mL represents a high risk of severe sepsis and/or septic shock.    As a Marker for Lower Respiratory Tract Infections that require antibiotic therapy:    PCT on Admission    Antibiotic Therapy       6-12 Hrs later    >0.5                Strongly Recommended  >0.25 - <0.5        Recommended   0.1 - 0.25          Discouraged              Remeasure/reassess PCT  <0.1                Strongly Discouraged     Remeasure/reassess PCT    As 28 day mortality risk marker: \"Change in Procalcitonin Result\" (>80% or <=80%) if Day 0 (or Day 1) and Day 4 values are available. Refer to [email or web address] 3pct-calculator.[email or web address]    Change in PCT <=80%  A decrease of PCT levels below or equal to 80% defines a positive change in PCT test result representing a higher risk for 28-day all-cause mortality of patients diagnosed with severe sepsis for septic shock.    Change in PCT >80%  A decrease of PCT levels of more than 80% defines a negative change in PCT result representing a lower risk for 28-day all-cause mortality of patients diagnosed with severe sepsis or septic shock.       Hemoglobin A1c [[id number]]  (Abnormal) Collected: 04/28/22 1103    Specimen: Blood Updated: 04/28/22 1148     Hemoglobin A1C 5.80 %     Narrative:      Hemoglobin A1C Ranges:    Increased Risk for Diabetes  5.7% to 6.4%  Diabetes                     >= 6.5%  Diabetic Goal                < 7.0%    POC Glucose Once [[id number]]  (Normal) Collected: 04/28/22 1124    Specimen: Blood Updated: 04/28/22 1127     Glucose 105 mg/dL      Comment: Meter: [id number] : [id number] Domo SAAVEDRA       POC Glucose Once [[id number]]  (Normal) Collected: 04/28/22 0638    Specimen: Blood Updated: 04/28/22 0655     Glucose 113 mg/dL      Comment: Meter: [id number] : [id number] Nabil SAAVEDRA       Renal Function Panel [[id number]]  (Abnormal) Collected: 04/28/22 0434    " Specimen: Blood Updated: 04/28/22 0549     Glucose 106 mg/dL      BUN 21 mg/dL      Creatinine 3.06 mg/dL      Sodium 135 mmol/L      Potassium 4.6 mmol/L      Chloride 97 mmol/L      CO2 24.5 mmol/L      Calcium 9.0 mg/dL      Albumin 3.40 g/dL      Phosphorus 2.6 mg/dL      Anion Gap 13.5 mmol/L      BUN/Creatinine Ratio 6.9     eGFR 17.3 mL/min/1.73      Comment: National Kidney Foundation and American Society of Nephrology (ASN) Task Force recommended calculation based on the Chronic Kidney Disease Epidemiology Collaboration (CKD-EPI) equation refit without adjustment for race.       Narrative:      GFR Normal >60  Chronic Kidney Disease <60  Kidney Failure <15      Magnesium [[id number]]  (Normal) Collected: 04/28/22 0434    Specimen: Blood Updated: 04/28/22 0535     Magnesium 1.7 mg/dL     CBC & Differential [[id number]]  (Abnormal) Collected: 04/28/22 0434    Specimen: Blood Updated: 04/28/22 0520    Narrative:      The following orders were created for panel order CBC & Differential.  Procedure                               Abnormality         Status                     ---------                               -----------         ------                     CBC Auto Differential[[id number]]        Abnormal            Final result                 Please view results for these tests on the individual orders.    CBC Auto Differential [[id number]]  (Abnormal) Collected: 04/28/22 0434    Specimen: Blood Updated: 04/28/22 0520     WBC 13.49 10*3/mm3      RBC 4.13 10*6/mm3      Hemoglobin 11.3 g/dL      Hematocrit 33.3 %      MCV 80.6 fL      MCH 27.4 pg      MCHC 33.9 g/dL      RDW 18.4 %      RDW-SD 53.4 fl      MPV 10.4 fL      Platelets 137 10*3/mm3      Neutrophil % 82.8 %      Lymphocyte % 8.6 %      Monocyte % 7.6 %      Eosinophil % 0.1 %      Basophil % 0.5 %      Neutrophils, Absolute 11.17 10*3/mm3      Lymphocytes, Absolute 1.16 10*3/mm3      Monocytes, Absolute 1.03 10*3/mm3      Eosinophils, Absolute 0.01  10*3/mm3      Basophils, Absolute 0.07 10*3/mm3     Urinalysis, Microscopic Only - Urine, Catheter [[id number]]  (Abnormal) Collected: 04/27/22 1712    Specimen: Urine, Catheter Updated: 04/27/22 2301     RBC, UA 6-12 /HPF      WBC, UA 31-50 /HPF      Bacteria, UA Trace /HPF      Squamous Epithelial Cells, UA 0-2 /HPF      Hyaline Casts, UA 3-6 /LPF      Methodology Manual Light Microscopy    Urinalysis With Culture If Indicated - Urine, Catheter [[id number]]  (Abnormal) Collected: 04/27/22 1712    Specimen: Urine, Catheter Updated: 04/27/22 2247     Color, UA Yellow     Appearance, UA Clear     pH, UA 8.0     Specific Gravity, UA 1.020     Glucose,  mg/dL (1+)     Ketones, UA Negative     Bilirubin, UA Negative     Blood, UA Small (1+)     Protein, UA >=300 mg/dL (3+)     Leuk Esterase, UA Small (1+)     Nitrite, UA Negative     Urobilinogen, UA 0.2 E.U./dL    COVID PRE-OP / PRE-PROCEDURE SCREENING ORDER (NO ISOLATION) - Swab, Nasopharynx [[id number]]  (Normal) Collected: 04/27/22 1711    Specimen: Swab from Nasopharynx Updated: 04/27/22 1810    Narrative:      The following orders were created for panel order COVID PRE-OP / PRE-PROCEDURE SCREENING ORDER (NO ISOLATION) - Swab, Nasopharynx.  Procedure                               Abnormality         Status                     ---------                               -----------         ------                     COVID-19,[facility] IN-HOUSE...[[id number]]  Normal              Final result                 Please view results for these tests on the individual orders.    COVID-19,[facility] IN-HOUSE CEPHEID/MARTY NP SWAB IN TRANSPORT MEDIA 8-12 HR TAT - Swab, Nasopharynx [[id number]]  (Normal) Collected: 04/27/22 1711    Specimen: Swab from Nasopharynx Updated: 04/27/22 1810     COVID19 Not Detected    Narrative:      Fact sheet for providers: https://www.fda.gov/media/211156/download     Fact sheet for patients: https://www.fda.gov/media/665134/download    Urine Drug Screen -  Urine, Clean Catch [[id number]]  (Abnormal) Collected: 04/27/22 1712    Specimen: Urine, Clean Catch Updated: 04/27/22 1804     Amphet/Methamphet, Screen Negative     Barbiturates Screen, Urine Negative     Benzodiazepine Screen, Urine Positive     Cocaine Screen, Urine Negative     Opiate Screen Negative     THC, Screen, Urine Negative     Methadone Screen, Urine Negative     Oxycodone Screen, Urine Positive    Narrative:      Negative Thresholds Per Drugs Screened:    Amphetamines                 500 ng/ml  Barbiturates                 200 ng/ml  Benzodiazepines              100 ng/ml  Cocaine                      300 ng/ml  Methadone                    300 ng/ml  Opiates                      300 ng/ml  Oxycodone                    100 ng/ml  THC                           50 ng/ml    The Normal Value for all drugs tested is negative. This report includes final unconfirmed screening results to be used for medical treatment purposes only. Unconfirmed results must not be used for non-medical purposes such as employment or legal testing. Clinical consideration should be applied to any drug of abuse test, particularly when unconfirmed results are used.            Comprehensive Metabolic Panel [[id number]]  (Abnormal) Collected: 04/27/22 1615    Specimen: Blood Updated: 04/27/22 1722     Glucose 178 mg/dL      BUN 57 mg/dL      Creatinine 5.37 mg/dL      Sodium 132 mmol/L      Potassium 6.7 mmol/L      Chloride 94 mmol/L      CO2 24.1 mmol/L      Calcium 9.0 mg/dL      Total Protein 6.9 g/dL      Albumin 3.80 g/dL      ALT (SGPT) 14 U/L      AST (SGOT) 30 U/L      Alkaline Phosphatase 140 U/L      Total Bilirubin 0.9 mg/dL      Globulin 3.1 gm/dL      A/G Ratio 1.2 g/dL      BUN/Creatinine Ratio 10.6     Anion Gap 13.9 mmol/L      eGFR 8.8 mL/min/1.73      Comment: <15 Indicative of kidney failure       Narrative:      GFR Normal >60  Chronic Kidney Disease <60  Kidney Failure <15      Blood Gas, Arterial - [[id number]]   (Abnormal) Collected: 04/27/22 1648    Specimen: Arterial Blood Updated: 04/27/22 1659     Site Arterial: right radial     Omar's Test Positive     pH, Arterial 7.395 pH units      pCO2, Arterial 44.0 mm Hg      pO2, Arterial 52.8 mm Hg      Comment: Meter: [id number] : [id number] Roshan NicoleCritical:Notify Dr dr madan farooq (27-Apr-22 16:53:55)Read back ok        HCO3, Arterial 27.0 mmol/L      Base Excess, Arterial 1.7 mmol/L      O2 Saturation Calculated 86.6 %      A-a Gradiant 0.5 mmHg      Barometric Pressure for Blood Gas 759.5 mmHg      Modality Room Air     FIO2 21 %      Rate 18 Breaths/minute     Ethanol [[id number]] Collected: 04/27/22 1615    Specimen: Blood Updated: 04/27/22 1657     Ethanol <10 mg/dL      Ethanol % <0.010 %         Data Review:  Results from last 7 days   Lab Units 05/01/22  1050 04/30/22  0327 04/29/22  0332   SODIUM mmol/L 132* 130* 132*   POTASSIUM mmol/L 4.6 4.9 4.3   CHLORIDE mmol/L 96* 93* 95*   CO2 mmol/L 21.3* 22.0 26.2   BUN mg/dL 41* 36* 32*   CREATININE mg/dL 6.22* 4.89* 4.12*   GLUCOSE mg/dL 99 94 179*   CALCIUM mg/dL 8.0* 8.0* 8.4*     Results from last 7 days   Lab Units 05/01/22  1050 04/29/22  2317 04/29/22  0332   WBC 10*3/mm3 12.18* 17.03* 14.34*   HEMOGLOBIN g/dL 10.1* 11.0* 10.5*   HEMATOCRIT % 29.6* 31.9* 31.9*   PLATELETS 10*3/mm3 172 150 128*         Results from last 7 days   Lab Units 04/28/22  1103   HEMOGLOBIN A1C % 5.80*     Lab Results   Lab Value Date/Time    TROPONINT 0.092 (C) 12/19/2021 1314    TROPONINT 0.117 (C) 11/30/2021 0153    TROPONINT 0.132 (C) 11/29/2021 2121     Results from last 7 days   Lab Units 04/30/22  0327   CHOLESTEROL mg/dL 145   TRIGLYCERIDES mg/dL 119   HDL CHOL mg/dL 44   LDL CHOL mg/dL 80     Results from last 7 days   Lab Units 04/27/22  1615   ALK PHOS U/L 140*   BILIRUBIN mg/dL 0.9   ALT (SGPT) U/L 14   AST (SGOT) U/L 30         Results from last 7 days   Lab Units 04/28/22  1103   HEMOGLOBIN A1C % 5.80*     Glucose    Date/Time Value Ref Range Status   05/01/2022 1202 108 70 - 130 mg/dL Final     Comment:     Treated Patient Confirmed by Repeat Meter: [id number] : [id number] Tiffany Ky   05/01/2022 0724 178 (H) 70 - 130 mg/dL Final     Comment:     Meter: [id number] : [id number] Karon Billy RN   05/01/2022 0658 62 (L) 70 - 130 mg/dL Final     Comment:     Meter: [id number] : [id number] Karon Cervantes RN   05/01/2022 0033 155 (H) 70 - 130 mg/dL Final     Comment:     Meter: [id number] : [id number] Karon Cervantes RN   05/01/2022 0009 53 (L) 70 - 130 mg/dL Final     Comment:     Meter: [id number] : [id number] Emilie Garcia NA   04/30/2022 1813 74 70 - 130 mg/dL Final     Comment:     Meter: [id number] : [id number] Chon Fernández RN   04/30/2022 1308 80 70 - 130 mg/dL Final     Comment:     Meter: [id number] : [id number] Chon Fernández RN   04/30/2022 0622 87 70 - 130 mg/dL Final     Comment:     Meter: [id number] : [id number] Phan Radha NA     Results from last 7 days   Lab Units 04/29/22  2317 04/27/22  1615   INR  0.97 1.02       Past Medical History:   Diagnosis Date   • Acute CVA (cerebrovascular accident) (HCC) 5/1/2022   • Acute on chronic diastolic CHF (congestive heart failure) ([facility])    • CAD (coronary artery disease) 12/20/2021   • Diabetes ([facility])    • Disease of thyroid gland    • GERD (gastroesophageal reflux disease)    • Hyperlipidemia 11/30/2018   • Hypertension    • Rheumatoid arthritis (HCC)        Assessment:  Active Hospital Problems    Diagnosis  POA   • **Encephalopathy, toxic [G92.9]  Unknown   • Acute CVA (cerebrovascular accident) (HCC) [I63.9]  Unknown   • Intracranial hemorrhage (HCC) [I62.9]  Unknown   • Acute metabolic encephalopathy [G93.41]  Yes   • Leukocytosis [D72.829]  Yes   • Acute on chronic diastolic CHF (congestive heart failure) (HCC) [I50.33]  Yes   • CAD (coronary artery disease) [I25.10]  Yes   • ESRD (end stage renal disease) (HCC) [N18.6]  Yes   •  Hypertensive disorder [I10]  Yes   • Acute DM type 2 causing complication (HCC) [E11.8]  Yes      Resolved Hospital Problems   No resolved problems to display.       Plan:  Continue with current medication.  ICU support.  As per multiple consultants.  Follow-up lab.    Aris Isbell MD  5/1/2022  17:20 EDT

## 2022-05-01 NOTE — PROGRESS NOTES
[facility] Progress Note      Patient Name: Zaina Martinez  : 1965  MRN: [id number]  Primary Care Physician:  Karson Oreilly MD  Date of admission: 2022    Subjective     Interval History:   Follow-up end-stage renal disease  She continues to be confused.  Non Tunneled HD was placed today .     Review of Systems:   As noted above    Objective     Vitals:   Temp:  [98.1 °F (36.7 °C)-99.7 °F (37.6 °C)] 98.3 °F (36.8 °C)  Heart Rate:  [47-93] 76  BP: ()/() 142/81  Flow (L/min):  [2] 2  No intake or output data in the 24 hours ending 22 1011    Physical Exam:    General Appearance: Patient is awake, nonverbal  Skin: warm and dry  HEENT: oral mucosa normal, nonicteric sclera  Neck: supple, no JVD  Lungs: CTA, unlabored breathing effort  Heart: RRR, normal S1 and S2, no rub  Abdomen: soft, nontender, nondistended, normoactive bowel  : no palpable bladder  Extremities: no edema, cyanosis or clubbing  Neuro: normal speech and mental status     Scheduled Meds:     atorvastatin, 40 mg, Oral, Nightly  heparin (porcine), 3,000 Units, Intracatheter, Once  insulin lispro, 0-7 Units, Subcutaneous, 4x Daily With Meals & Nightly  levothyroxine, 125 mcg, Oral, Q AM  OLANZapine, 5 mg, Oral, BID  pantoprazole, 40 mg, Intravenous, Q AM  rOPINIRole, 0.75 mg, Oral, Nightly  sertraline, 100 mg, Oral, Daily  sodium chloride, 10 mL, Intravenous, Q12H  tamsulosin, 0.4 mg, Oral, Daily  Vancomycin Pharmacy Intermittent Dosing, , Does not apply, Daily      IV Meds:   dexmedetomidine, 0.2-1.5 mcg/kg/hr, Last Rate: Stopped (2240)  niCARdipine, 5-15 mg/hr, Last Rate: 5 mg/hr (22)  Pharmacy to dose vancomycin,         Results Reviewed:   I have personally reviewed the results from the time of this admission to 2022 10:11 EDT     Results from last 7 days   Lab Units 22  0327 22  0332 22  0434 22  1615   SODIUM mmol/L 130* 132* 135* 132*    POTASSIUM mmol/L 4.9 4.3 4.6 6.7*   CHLORIDE mmol/L 93* 95* 97* 94*   CO2 mmol/L 22.0 26.2 24.5 24.1   BUN mg/dL 36* 32* 21* 57*   CREATININE mg/dL 4.89* 4.12* 3.06* 5.37*   CALCIUM mg/dL 8.0* 8.4* 9.0 9.0   BILIRUBIN mg/dL  --   --   --  0.9   ALK PHOS U/L  --   --   --  140*   ALT (SGPT) U/L  --   --   --  14   AST (SGOT) U/L  --   --   --  30   GLUCOSE mg/dL 94 179* 106* 178*       Estimated Creatinine Clearance: 11.7 mL/min (A) (by C-G formula based on SCr of 4.89 mg/dL (H)).    Results from last 7 days   Lab Units 04/30/22 0327 04/29/22 0332 04/28/22  0434   MAGNESIUM mg/dL  --   --  1.7   PHOSPHORUS mg/dL 3.8 3.7 2.6             Results from last 7 days   Lab Units 04/29/22 2317 04/29/22  0332 04/28/22  0434 04/27/22  1615   WBC 10*3/mm3 17.03* 14.34* 13.49* 12.84*   HEMOGLOBIN g/dL 11.0* 10.5* 11.3* 11.5*   PLATELETS 10*3/mm3 150 128* 137* 132*       Results from last 7 days   Lab Units 04/29/22 2317 04/27/22  1615   INR  0.97 1.02       Assessment / Plan     ASSESSMENT:  1.  End-stage renal disease secondary to diabetic and hypertensive glomerulosclerosis on maintenance hemodialysis every Tuesday, Thursday and Saturday with multiple missed dialysis treatment.  Last dialysis was on Wednesday night and her tunneled cath was removed due to bacteremia  2.  Altered mental secondary to CKD improved  3.  Purulent drainage from the tunneled dialysis catheter status post removal  4.  Diabetes mellitus type 2 with renal complication  5.  Coronary artery disease  6.  Acute on chronic diastolic dysfunction symptoms improved with dialysis yesterday  7.  Anemia of chronic kidney disease, hemoglobin today 10.5 treated with long-acting ANDRAE as an outpatient  8.  Hypertension, labile measurement  9.  Intracerebral bleed    PLAN:  1.  We will dialyze today for metabolic clearance and to make up for himself treatment on Thursday and Saturday.  2. Will attempt to be gentle with UF given intracerebral bleed  3.  Dialyze   With no  Heparin  4. Surveillance labs     Thank you for involving us in the care of Zaina Martinez.  Please feel free to call with any questions.    Reynaldo Sotelo MD  05/01/22  10:11 EDT    [facility]  [phone]      Much of this encounter note is an electronic transcription/translation of spoken language to printed text. The electronic translation of spoken language may permit erroneous, or at times, nonsensical words or phrases to be inadvertently transcribed; Although I have reviewed the note for such errors, some may still exist.

## 2022-05-01 NOTE — PROGRESS NOTES
Name: Zaina Martinez ADMIT: 2022   : 1965  PCP: Karson Oreilly MD    MRN: [id number] LOS: 4 days   AGE/SEX: 56 y.o. female  ROOM: [address]    [place], Subsequent Hospital Care    56 y.o. female with end-stage renal disease requiring hemodialysis, with history of recent tunneled dialysis catheter removal for infection.  More restless today, less interactive.  Answer simple questions like name.    Scheduled Medications:   atorvastatin, 40 mg, Oral, Nightly  heparin (porcine), 3,000 Units, Intracatheter, Once  insulin lispro, 0-7 Units, Subcutaneous, 4x Daily With Meals & Nightly  levothyroxine, 125 mcg, Oral, Q AM  OLANZapine, 5 mg, Oral, BID  pantoprazole, 40 mg, Intravenous, Q AM  rOPINIRole, 0.75 mg, Oral, Nightly  sertraline, 100 mg, Oral, Daily  sodium chloride, 10 mL, Intravenous, Q12H  tamsulosin, 0.4 mg, Oral, Daily  Vancomycin Pharmacy Intermittent Dosing, , Does not apply, Daily    Active Infusions:  dexmedetomidine, 0.2-1.5 mcg/kg/hr, Last Rate: Stopped (22)  niCARdipine, 5-15 mg/hr, Last Rate: 5 mg/hr (22)  Pharmacy to dose vancomycin,     Vital Signs  Vital Signs Patient Vitals for the past 24 hrs:   BP Temp Temp src Pulse SpO2 Weight   22 0807 -- 98.3 °F (36.8 °C) Oral -- -- --   22 0700 141/67 -- -- 80 98 % --   22 0600 137/74 -- -- 76 97 % 57.8 kg (127 lb 6.8 oz)   22 0500 131/67 -- -- 78 97 % --   22 0400 140/64 99.5 °F (37.5 °C) Oral 81 96 % --   22 0300 146/68 -- -- 82 91 % --   22 0200 145/70 -- -- 75 96 % --   22 0100 149/89 -- -- 82 98 % --   22 0000 113/52 -- -- 64 96 % --   22 2300 (!) 132/117 99.7 °F (37.6 °C) Oral 93 94 % --   22 2200 153/70 -- -- 73 98 % --   22 2100 166/86 -- -- 71 96 % --   22 171/80 -- -- 71 99 % --   22 1745 118/57 -- -- 53 99 % --   22 1730 119/58 -- -- 56 99 % --   22 1715 122/57 -- -- 68 98 % --   22 1645  100/46 -- -- 53 98 % --   04/30/22 1630 99/49 98.4 °F (36.9 °C) -- 53 98 % --   04/30/22 1530 136/68 -- -- 72 96 % --   04/30/22 1515 152/80 -- -- 70 99 % --   04/30/22 1500 (!) 181/87 -- -- 70 99 % --   04/30/22 1445 (!) 187/100 -- -- 71 100 % --   04/30/22 1430 163/90 -- -- 67 99 % --   04/30/22 1415 (!) 184/97 -- -- 70 100 % --   04/30/22 1400 153/73 -- -- (!) 49 100 % --   04/30/22 1345 144/68 -- -- (!) 47 100 % --   04/30/22 1330 146/69 -- -- (!) 47 100 % --   04/30/22 1315 149/69 -- -- (!) 48 100 % --   04/30/22 1300 162/81 -- -- 59 100 % --   04/30/22 1245 -- -- -- 60 99 % --   04/30/22 1215 -- 98.1 °F (36.7 °C) Oral -- -- --   04/30/22 1130 177/83 -- -- 67 98 % --   04/30/22 1115 148/74 -- -- 67 99 % --   04/30/22 1100 135/71 -- -- 57 100 % --   04/30/22 1045 126/64 -- -- 58 100 % --     I/O:  I/O last 3 completed shifts:  In: 570.7 [I.V.:314.7; Blood:256]  Out: -     Physical Exam: Fever curve trending down, but still with fever yesterday to 99.7.  Previous catheter exit site clean, dry and intact.  No cellulitis, purulence or abscess suspected.     CBC    Results from last 7 days   Lab Units 04/29/22  2317 04/29/22  0332 04/28/22  0434 04/27/22  1615   WBC 10*3/mm3 17.03* 14.34* 13.49* 12.84*   HEMOGLOBIN g/dL 11.0* 10.5* 11.3* 11.5*   PLATELETS 10*3/mm3 150 128* 137* 132*     BMP   Results from last 7 days   Lab Units 04/30/22  0327 04/29/22  0332 04/28/22  0434 04/27/22  1615   SODIUM mmol/L 130* 132* 135* 132*   POTASSIUM mmol/L 4.9 4.3 4.6 6.7*   CHLORIDE mmol/L 93* 95* 97* 94*   CO2 mmol/L 22.0 26.2 24.5 24.1   BUN mg/dL 36* 32* 21* 57*   CREATININE mg/dL 4.89* 4.12* 3.06* 5.37*   GLUCOSE mg/dL 94 179* 106* 178*   MAGNESIUM mg/dL  --   --  1.7  --    PHOSPHORUS mg/dL 3.8 3.7 2.6  --      Cr Clearance Estimated Creatinine Clearance: 11.7 mL/min (A) (by C-G formula based on SCr of 4.89 mg/dL (H)).  Infection   Results from last 7 days   Lab Units 04/28/22  1113 04/28/22  1102 04/28/22  0434  04/27/22  1712   BLOODCX  No growth at 2 days No growth at 2 days  --   --    URINECX   --   --   --  <10,000 CFU/mL Staphylococcus aureus, MRSA*   PROCALCITONIN ng/mL  --   --  0.24  --      Assessment/Plan   Assessment & Plan    Encephalopathy, toxic    Hypertensive disorder    Acute DM type 2 causing complication (HCC)    ESRD (end stage renal disease) (HCC)    CAD (coronary artery disease)    Acute on chronic diastolic CHF (congestive heart failure) (HCC)    Leukocytosis    Acute metabolic encephalopathy    56 y.o. female with end-stage renal disease requiring hemodialysis with history of catheter exit site infection, post catheter removal.  Had been having low-grade fevers with persistent leukocytosis, despite negative blood cultures.  I have communicated with Dr. Davis (infectious disease, and together we think that a temporary, nontunneled dialysis catheter is safest.  Will proceed this morning.  Discussed with patient's  yesterday at the bedside.    Darien Tello MD  05/01/22  09:12 EDT    Please call my office with any question: [phone]    Active Hospital Problems    Diagnosis  POA   • **Encephalopathy, toxic [G92.9]  Unknown   • Acute metabolic encephalopathy [G93.41]  Yes   • Leukocytosis [D72.829]  Yes   • Acute on chronic diastolic CHF (congestive heart failure) (HCC) [I50.33]  Yes   • CAD (coronary artery disease) [I25.10]  Yes   • ESRD (end stage renal disease) (HCC) [N18.6]  Yes   • Hypertensive disorder [I10]  Yes   • Acute DM type 2 causing complication (HCC) [E11.8]  Yes      Resolved Hospital Problems   No resolved problems to display.

## 2022-05-01 NOTE — CONSULTS
"Adult Nutrition  Assessment/PES    Patient Name:  Zaina Martinez  YOB: 1965  MRN: [id number]  Admit Date:  4/27/2022    Assessment Date:  5/1/2022    Comments:  Nutrition consult for TF assessment.  Admitted with acute metabolic encephalopathy.  ICH.  Confused, nonverbal.  ESRD on HD.  S/p non tunneled HD catheter placement today.  NG in place.  TFs to start.    Recommend starting TFs of Novasource Renal at 20 ml/hr and advancing by 10 ml q 8 hours to goal rate of 40 ml/hr.  Recommend free water flushes of 30 ml q 4 hours.    RD to continue to follow closely.     Reason for Assessment     Row Name 05/01/22 1132          Reason for Assessment    Reason For Assessment TF/PN;nurse/nurse practitioner consult     Diagnosis cardiac disease;renal disease;diabetes diagnosis/complications;endocrine conditions;gastrointestinal disease;other (see comments);neurologic conditions  CHF, CAD, DM, thyroid d/o, GERD, HLD, HTN, RA, ESRD on HD; adm with acute metabolic encephalopathy, ICH                Nutrition/Diet History     Row Name 05/01/22 1133          Nutrition/Diet History    Typical Intake (Food/Fluid/EN/PN) NPO, NG in place                Anthropometrics     Row Name 05/01/22 1135 05/01/22 0600       Anthropometrics    Height 157.5 cm (62.01\") --    Weight 57.8 kg (127 lb 6.8 oz) 57.8 kg (127 lb 6.8 oz)    Weight for Calculation 57.8 kg (127 lb 6.8 oz) --               Labs/Tests/Procedures/Meds     Row Name 05/01/22 1133          Labs/Procedures/Meds    Lab Results Reviewed reviewed, pertinent     Lab Results Comments Na, Creat, BUN, Ca, GFR, WBC, Hgb, Hct            Diagnostic Tests/Procedures    Diagnostic Test/Procedure Reviewed reviewed, pertinent     Diagnostic Test/Procedures Comments non tunneled HD cath placed today            Medications    Pertinent Medications Reviewed reviewed, pertinent     Pertinent Medications Comments heparin, admelog, synthroid, protonix, vanc, precedex, cardene           " "     Physical Findings     Row Name 05/01/22 1134          Physical Findings    Overall Physical Appearance B=14, scab, bruised; 2L O2     Enteral Access Devices nasogastric tube                Estimated/Assessed Needs - Anthropometrics     Row Name 05/01/22 1135 05/01/22 0600       Anthropometrics    Height 157.5 cm (62.01\") --    Weight 57.8 kg (127 lb 6.8 oz) 57.8 kg (127 lb 6.8 oz)    Weight for Calculation 57.8 kg (127 lb 6.8 oz) --       Estimated/Assessed Needs    Additional Documentation KCAL/KG (Group);Protein Requirements (Group);Fluid Requirements (Group) --       KCAL/KG    KCAL/KG 30 Kcal/Kg (kcal);35 Kcal/Kg (kcal) --    30 Kcal/Kg (kcal) 1734 --    35 Kcal/Kg (kcal) 2023 --       Protein Requirements    Weight Used For Protein Calculations 57.8 kg (127 lb 6.8 oz) --    Est Protein Requirement Amount (gms/kg) 1.5 gm protein  69-87 --    Estimated Protein Requirements (gms/day) 86.7 --       Fluid Requirements    Fluid Requirements (mL/day) 1400 --    Estimated Fluid Requirement Method RDA Method --    RDA Method (mL) 1400 --               Nutrition Prescription Ordered     Row Name 05/01/22 1136          Nutrition Prescription PO    Current PO Diet NPO                       Problem/Interventions:   Problem 1     Row Name 05/01/22 1136          Nutrition Diagnoses Problem 1    Problem 1 Needs Alternate Route     Etiology (related to) Medical Diagnosis     Neurological Encephalopathy;ICH     Signs/Symptoms (evidenced by) NPO                      Intervention Goal     Row Name 05/01/22 1136          Intervention Goal    General Maintain nutrition;Disease management/therapy;Meet nutritional needs for age/condition;Nutrition support treatment     TF/PN Inititiate TF/PN;Establish TF tolerance;Tolerate TF at goal     Weight Maintain weight                Nutrition Intervention     Row Name 05/01/22 1136          Nutrition Intervention    RD/Tech Action Follow Tx progress;Care plan reviewd;Recommend/ordered  "    Recommended/Ordered EN                Nutrition Prescription     Row Name 05/01/22 1136          Nutrition Prescription EN    Enteral Prescription Enteral begin/change;Enteral to supply     Enteral Route NG     Product Novasource Renal     TF Delivery Method Continuous     Continuous TF Goal Rate (mL/hr) 40 mL/hr     Continuous TF Starting Rate (mL/hr) 20 mL/hr     Water flush (mL)  30 mL     Water Flush Frequency Every 4 hours     New EN Prescription Ordered? Yes            EN to Supply    Kcal/Day 1920 Kcal/Day     Protein (gm/day) 87 gm/day     Meet Estimated Kcal Need (%) 100 %     Meet Estimated Protein Need (%) 100 %     TF Free H2O (mL) 691 mL                Education/Evaluation     Row Name 05/01/22 1138          Education    Education Will Instruct as appropriate            Monitor/Evaluation    Monitor Per protocol;I&O;Pertinent labs;TF delivery/tolerance;Weight;Skin status;Symptoms                 Electronically signed by:  Bee Drake RD  05/01/22 11:38 EDT

## 2022-05-02 ENCOUNTER — APPOINTMENT (OUTPATIENT)
Dept: NEUROLOGY | Facility: HOSPITAL | Age: 57
End: 2022-05-02

## 2022-05-02 LAB
ALBUMIN SERPL-MCNC: 3 G/DL (ref 3.5–5.2)
ANION GAP SERPL CALCULATED.3IONS-SCNC: 12.7 MMOL/L (ref 5–15)
BASOPHILS # BLD AUTO: 0.04 10*3/MM3 (ref 0–0.2)
BASOPHILS NFR BLD AUTO: 0.4 % (ref 0–1.5)
BUN SERPL-MCNC: 26 MG/DL (ref 6–20)
BUN/CREAT SERPL: 6.1 (ref 7–25)
CALCIUM SPEC-SCNC: 7.9 MG/DL (ref 8.6–10.5)
CHLORIDE SERPL-SCNC: 95 MMOL/L (ref 98–107)
CO2 SERPL-SCNC: 22.3 MMOL/L (ref 22–29)
CREAT SERPL-MCNC: 4.23 MG/DL (ref 0.57–1)
DEPRECATED RDW RBC AUTO: 52.7 FL (ref 37–54)
EGFRCR SERPLBLD CKD-EPI 2021: 11.7 ML/MIN/1.73
EOSINOPHIL # BLD AUTO: 0.06 10*3/MM3 (ref 0–0.4)
EOSINOPHIL NFR BLD AUTO: 0.6 % (ref 0.3–6.2)
ERYTHROCYTE [DISTWIDTH] IN BLOOD BY AUTOMATED COUNT: 17.7 % (ref 12.3–15.4)
GLUCOSE BLDC GLUCOMTR-MCNC: 145 MG/DL (ref 70–130)
GLUCOSE BLDC GLUCOMTR-MCNC: 172 MG/DL (ref 70–130)
GLUCOSE BLDC GLUCOMTR-MCNC: 172 MG/DL (ref 70–130)
GLUCOSE BLDC GLUCOMTR-MCNC: 195 MG/DL (ref 70–130)
GLUCOSE BLDC GLUCOMTR-MCNC: 216 MG/DL (ref 70–130)
GLUCOSE BLDC GLUCOMTR-MCNC: 223 MG/DL (ref 70–130)
GLUCOSE SERPL-MCNC: 119 MG/DL (ref 65–99)
HCT VFR BLD AUTO: 32.3 % (ref 34–46.6)
HGB BLD-MCNC: 10.8 G/DL (ref 12–15.9)
IMM GRANULOCYTES # BLD AUTO: 0.05 10*3/MM3 (ref 0–0.05)
IMM GRANULOCYTES NFR BLD AUTO: 0.5 % (ref 0–0.5)
LYMPHOCYTES # BLD AUTO: 0.93 10*3/MM3 (ref 0.7–3.1)
LYMPHOCYTES NFR BLD AUTO: 8.8 % (ref 19.6–45.3)
MCH RBC QN AUTO: 27.6 PG (ref 26.6–33)
MCHC RBC AUTO-ENTMCNC: 33.4 G/DL (ref 31.5–35.7)
MCV RBC AUTO: 82.4 FL (ref 79–97)
MONOCYTES # BLD AUTO: 1.23 10*3/MM3 (ref 0.1–0.9)
MONOCYTES NFR BLD AUTO: 11.7 % (ref 5–12)
NEUTROPHILS NFR BLD AUTO: 78 % (ref 42.7–76)
NEUTROPHILS NFR BLD AUTO: 8.24 10*3/MM3 (ref 1.7–7)
NRBC BLD AUTO-RTO: 0 /100 WBC (ref 0–0.2)
PHOSPHATE SERPL-MCNC: 3.2 MG/DL (ref 2.5–4.5)
PLATELET # BLD AUTO: 203 10*3/MM3 (ref 140–450)
PMV BLD AUTO: 10.9 FL (ref 6–12)
POTASSIUM SERPL-SCNC: 3.9 MMOL/L (ref 3.5–5.2)
RBC # BLD AUTO: 3.92 10*6/MM3 (ref 3.77–5.28)
SARS-COV-2 RNA RESP QL NAA+PROBE: NOT DETECTED
SODIUM SERPL-SCNC: 130 MMOL/L (ref 136–145)
VANCOMYCIN SERPL-MCNC: 19.7 MCG/ML (ref 5–40)
WBC NRBC COR # BLD: 10.55 10*3/MM3 (ref 3.4–10.8)

## 2022-05-02 PROCEDURE — 80202 ASSAY OF VANCOMYCIN: CPT | Performed by: HOSPITALIST

## 2022-05-02 PROCEDURE — 97530 THERAPEUTIC ACTIVITIES: CPT

## 2022-05-02 PROCEDURE — 99232 SBSQ HOSP IP/OBS MODERATE 35: CPT | Performed by: STUDENT IN AN ORGANIZED HEALTH CARE EDUCATION/TRAINING PROGRAM

## 2022-05-02 PROCEDURE — 92610 EVALUATE SWALLOWING FUNCTION: CPT

## 2022-05-02 PROCEDURE — 63710000001 INSULIN LISPRO (HUMAN) PER 5 UNITS: Performed by: HOSPITALIST

## 2022-05-02 PROCEDURE — 97162 PT EVAL MOD COMPLEX 30 MIN: CPT

## 2022-05-02 PROCEDURE — 25010000002 HYDRALAZINE PER 20 MG: Performed by: INTERNAL MEDICINE

## 2022-05-02 PROCEDURE — 97166 OT EVAL MOD COMPLEX 45 MIN: CPT

## 2022-05-02 PROCEDURE — 99233 SBSQ HOSP IP/OBS HIGH 50: CPT | Performed by: PSYCHIATRY & NEUROLOGY

## 2022-05-02 PROCEDURE — 82962 GLUCOSE BLOOD TEST: CPT

## 2022-05-02 PROCEDURE — 95816 EEG AWAKE AND DROWSY: CPT | Performed by: PSYCHIATRY & NEUROLOGY

## 2022-05-02 PROCEDURE — 95816 EEG AWAKE AND DROWSY: CPT

## 2022-05-02 PROCEDURE — 85025 COMPLETE CBC W/AUTO DIFF WBC: CPT | Performed by: HOSPITALIST

## 2022-05-02 PROCEDURE — 80069 RENAL FUNCTION PANEL: CPT | Performed by: HOSPITALIST

## 2022-05-02 PROCEDURE — U0003 INFECTIOUS AGENT DETECTION BY NUCLEIC ACID (DNA OR RNA); SEVERE ACUTE RESPIRATORY SYNDROME CORONAVIRUS 2 (SARS-COV-2) (CORONAVIRUS DISEASE [COVID-19]), AMPLIFIED PROBE TECHNIQUE, MAKING USE OF HIGH THROUGHPUT TECHNOLOGIES AS DESCRIBED BY CMS-2020-01-R: HCPCS | Performed by: STUDENT IN AN ORGANIZED HEALTH CARE EDUCATION/TRAINING PROGRAM

## 2022-05-02 RX ORDER — HYDRALAZINE HYDROCHLORIDE 10 MG/1
10 TABLET, FILM COATED ORAL EVERY 8 HOURS SCHEDULED
Status: DISCONTINUED | OUTPATIENT
Start: 2022-05-02 | End: 2022-05-06

## 2022-05-02 RX ORDER — CLONIDINE HYDROCHLORIDE 0.1 MG/1
0.2 TABLET ORAL EVERY 6 HOURS SCHEDULED
Status: DISCONTINUED | OUTPATIENT
Start: 2022-05-02 | End: 2022-05-03

## 2022-05-02 RX ORDER — AMLODIPINE BESYLATE 10 MG/1
10 TABLET ORAL
Status: DISCONTINUED | OUTPATIENT
Start: 2022-05-03 | End: 2022-05-13 | Stop reason: HOSPADM

## 2022-05-02 RX ORDER — AMLODIPINE BESYLATE 5 MG/1
5 TABLET ORAL ONCE
Status: COMPLETED | OUTPATIENT
Start: 2022-05-02 | End: 2022-05-02

## 2022-05-02 RX ADMIN — CLONIDINE HYDROCHLORIDE 0.2 MG: 0.1 TABLET ORAL at 11:52

## 2022-05-02 RX ADMIN — OLANZAPINE 5 MG: 5 TABLET ORAL at 08:01

## 2022-05-02 RX ADMIN — CLONIDINE HYDROCHLORIDE 0.2 MG: 0.1 TABLET ORAL at 23:35

## 2022-05-02 RX ADMIN — NICARDIPINE HYDROCHLORIDE 5 MG/HR: 25 INJECTION, SOLUTION INTRAVENOUS at 13:14

## 2022-05-02 RX ADMIN — Medication 10 ML: at 20:22

## 2022-05-02 RX ADMIN — HYDRALAZINE HYDROCHLORIDE 10 MG: 20 INJECTION INTRAMUSCULAR; INTRAVENOUS at 20:26

## 2022-05-02 RX ADMIN — ROPINIROLE HYDROCHLORIDE 0.75 MG: 0.5 TABLET, FILM COATED ORAL at 20:25

## 2022-05-02 RX ADMIN — INSULIN LISPRO 2 UNITS: 100 INJECTION, SOLUTION INTRAVENOUS; SUBCUTANEOUS at 20:25

## 2022-05-02 RX ADMIN — Medication 10 ML: at 08:01

## 2022-05-02 RX ADMIN — INSULIN LISPRO 2 UNITS: 100 INJECTION, SOLUTION INTRAVENOUS; SUBCUTANEOUS at 00:24

## 2022-05-02 RX ADMIN — PANTOPRAZOLE SODIUM 40 MG: 40 INJECTION, POWDER, FOR SOLUTION INTRAVENOUS at 05:55

## 2022-05-02 RX ADMIN — AMLODIPINE BESYLATE 5 MG: 5 TABLET ORAL at 08:01

## 2022-05-02 RX ADMIN — DEXMEDETOMIDINE HYDROCHLORIDE 0.9 MCG/KG/HR: 4 INJECTION INTRAVENOUS at 16:27

## 2022-05-02 RX ADMIN — LEVOTHYROXINE SODIUM 125 MCG: 0.12 TABLET ORAL at 05:54

## 2022-05-02 RX ADMIN — OLANZAPINE 5 MG: 5 TABLET ORAL at 20:21

## 2022-05-02 RX ADMIN — AMLODIPINE BESYLATE 5 MG: 5 TABLET ORAL at 17:40

## 2022-05-02 RX ADMIN — ATORVASTATIN CALCIUM 40 MG: 20 TABLET, FILM COATED ORAL at 20:21

## 2022-05-02 RX ADMIN — NICARDIPINE HYDROCHLORIDE 2.5 MG/HR: 25 INJECTION, SOLUTION INTRAVENOUS at 02:18

## 2022-05-02 RX ADMIN — DEXMEDETOMIDINE HYDROCHLORIDE 0.8 MCG/KG/HR: 4 INJECTION INTRAVENOUS at 07:28

## 2022-05-02 RX ADMIN — SERTRALINE 100 MG: 100 TABLET, FILM COATED ORAL at 08:01

## 2022-05-02 RX ADMIN — INSULIN LISPRO 3 UNITS: 100 INJECTION, SOLUTION INTRAVENOUS; SUBCUTANEOUS at 11:51

## 2022-05-02 RX ADMIN — NICARDIPINE HYDROCHLORIDE 5 MG/HR: 25 INJECTION, SOLUTION INTRAVENOUS at 08:01

## 2022-05-02 RX ADMIN — CLONIDINE HYDROCHLORIDE 0.2 MG: 0.1 TABLET ORAL at 17:40

## 2022-05-02 RX ADMIN — INSULIN LISPRO 3 UNITS: 100 INJECTION, SOLUTION INTRAVENOUS; SUBCUTANEOUS at 08:01

## 2022-05-02 NOTE — THERAPY EVALUATION
Patient Name: Zaina Martinez  : 1965    MRN: [id number]                              Today's Date: 2022       Admit Date: 2022    Visit Dx:     ICD-10-CM ICD-9-CM   1. Acute metabolic encephalopathy  G93.41 348.31   2. ESRD (end stage renal disease) on dialysis (Formerly [facility])  N18.6 585.6    Z99.2 V45.11   3. Acute hypoxemic respiratory failure (Formerly [facility])  J96.01 518.81   4. Hyperkalemia  E87.5 276.7   5. ESRD (end stage renal disease) (Formerly [facility])  N18.6 585.6     Patient Active Problem List   Diagnosis   • Renal insufficiency   • Hypertensive disorder   • Hypothyroidism   • Acute DM type 2 causing complication ([facility]ly [facility])   • Rheumatoid arthritis ([facility]ly [facility])   • Angioedema   • Esophageal dysmotility   • Anemia   • Medically noncompliant   • Myocardial infarction due to demand ischemia ([facility]ly [facility])   • Enteritis   • PRES (posterior reversible encephalopathy syndrome)   • Urine retention   • Klebsiella infection   • Superficial thrombophlebitis   • Generalized weakness   • ESRD (end stage renal disease) (Formerly [facility])   • CAD (coronary artery disease)   • Abnormal urinalysis   • Acute on chronic diastolic CHF (congestive heart failure) ([facility]ly [facility])   • Pyelonephritis   • Calculus of gallbladder with acute on chronic cholecystitis without obstruction   • Pleural effusion on right   • Anemia due to chronic kidney disease, on chronic dialysis ([facility]ly [facility])   • Abnormal findings on diagnostic imaging of other specified body structures   • Acute upper respiratory infection   • Agitation   • Alkaline phosphatase raised   • Casts present in urine   • Cellulitis of toe   • Hip pain   • Community acquired pneumonia   • Depressive disorder   • Diarrhea of presumed infectious origin   • Difficult or painful urination   • Disease due to severe acute respiratory syndrome coronavirus 2 (SARS-CoV-2)   • Dyspnea   • Encounter for follow-up examination after completed treatment for conditions other than malignant neoplasm   • H/O: hypothyroidism   • Hyperlipidemia   •  "Hypomagnesemia   • Intractable vomiting with nausea   • Leukocytosis   • Luetscher's syndrome   • Need for influenza vaccination   • Restless legs   • Noncompliance with treatment   • Shoulder pain   • Urinary tract infectious disease   • Metabolic encephalopathy   • Abnormal findings on diagnostic imaging of abdomen   • Status post cholecystectomy   • Hyponatremia   • Leukocytosis   • Acute metabolic encephalopathy   • Encephalopathy, toxic   • Acute CVA (cerebrovascular accident) (HCC)   • Intracranial hemorrhage (HCC)     Past Medical History:   Diagnosis Date   • Acute CVA (cerebrovascular accident) (HCC) 5/1/2022   • Acute on chronic diastolic CHF (congestive heart failure) (HCC)    • CAD (coronary artery disease) 12/20/2021   • Diabetes (HCC)    • Disease of thyroid gland    • GERD (gastroesophageal reflux disease)    • Hyperlipidemia 11/30/2018   • Hypertension    • Rheumatoid arthritis (HCC)      Past Surgical History:   Procedure Laterality Date   • CHOLECYSTECTOMY WITH INTRAOPERATIVE CHOLANGIOGRAM N/A 1/10/2022    Procedure: Laparoscopic cholecystectomy with intraoperative cholangiogram;  Surgeon: Ramana Raygoza MD;  Location: [facility];  Service: General;  Laterality: N/A;   • EYE SURGERY     • HYSTERECTOMY     • INSERTION HEMODIALYSIS CATHETER N/A 12/6/2021    Procedure: HEMODIALYSIS CATHETER INSERTION;  Surgeon: Keli Salazar MD;  Location: [facility] 18/19;  Service: Vascular;  Laterality: N/A;      General Information     Row Name 05/02/22 1214          OT Time and Intention    Document Type evaluation  -SM     Mode of Treatment co-treatment;occupational therapy;physical therapy  -     Row Name 05/02/22 1214          General Information    Patient Profile Reviewed yes  -SM     Prior Level of Function --  pt does not answer questions at this time, per [facility] notes, pt spouse \"cares for her at baseline\" She does not use AD for mobility. Anticipate pt being independent with some-most " ADLs.  -SM     Existing Precautions/Restrictions fall  -     Barriers to Rehab cognitive status;medically complex  -     Row Name 05/02/22 1214          Occupational Profile    Environmental Supports and Barriers (Occupational Profile) Pt has a SPC, rwx, and w/c per admission report from [facility] but does not use.  -     Row Name 05/02/22 1214          Living Environment    People in Home spouse;grandchild(pro)  -     Row Name 05/02/22 1214          Cognition    Orientation Status (Cognition) oriented to;person;unable/difficult to assess  pt with receptive aphasia, also at times non sensical answers provided/word salad. Pt does state her name, she follows simple one step commands ~25% accuracy or less.  -     Row Name 05/02/22 1214          Safety Issues, Functional Mobility    Safety Issues Affecting Function (Mobility) ability to follow commands;insight into deficits/self-awareness;at risk behavior observed;awareness of need for assistance;impulsivity;safety precaution awareness;safety precautions follow-through/compliance;judgment;problem-solving  pulling at lines, in restraints at start and end of session.  -     Impairments Affecting Function (Mobility) balance;cognition;endurance/activity tolerance;postural/trunk control;strength;coordination;grasp;pain  -           User Key  (r) = Recorded By, (t) = Taken By, (c) = Cosigned By    Initials Name Provider Type     Tesha Charles OT Occupational Therapist                 Mobility/ADL's     Row Name 05/02/22 1220          Bed Mobility    Bed Mobility rolling left;rolling right  -SM     Rolling Left [place] (Bed Mobility) moderate assist (50% patient effort);2 person assist;verbal cues;nonverbal cues (demo/gesture)  -SM     Rolling Right [place] (Bed Mobility) moderate assist (50% patient effort);2 person assist;verbal cues;nonverbal cues (demo/gesture)  -SM     Supine-Sit [place] (Bed Mobility) maximum assist (25% patient effort);2  person assist;verbal cues;nonverbal cues (demo/gesture)  -     Sit-Supine [place] (Bed Mobility) maximum assist (25% patient effort);2 person assist;verbal cues;nonverbal cues (demo/gesture)  -     Assistive Device (Bed Mobility) draw sheet;head of bed elevated  -     Comment, (Bed Mobility) pt pushing into extension sitting EOB, not following commands for trunk flexion and return to bed after a few minutes and pt requires total A from OT to provent sliding forward.  -[facility] Name 05/02/22 1220          Transfers    Comment, (Transfers) not appropriate today.  -[facility] Name 05/02/22 1220          Activities of Daily Living    BADL Assessment/Intervention lower body dressing;grooming  -[facility] Name 05/02/22 1220          Lower Body Dressing Assessment/Training    [place] Level (Lower Body Dressing) lower body dressing skills;don;socks;dependent (less than 25% patient effort)  -     Position (Lower Body Dressing) sitting up in bed  -[facility] Name 05/02/22 1220          Grooming Assessment/Training    [place] (Grooming) grooming skills;dependent (less than 25% patient effort);nonverbal cues (demo/gesture);verbal cues  -     Position (Grooming) sitting up in bed  -     Comment, (Grooming) max cues for pt to wash face, not following commands and becomes aggiated as OT assists.  -           User Key  (r) = Recorded By, (t) = Taken By, (c) = Cosigned By    Initials Name Provider Type     Tesha Charles, OT Occupational Therapist               Obj/Interventions     Row Name 05/02/22 1226          Sensory Assessment (Somatosensory)    Sensory Assessment (Somatosensory) unable/difficult to assess  -[facility] Name 05/02/22 1226          Vision Assessment/Intervention    Vision Assessment Comment looking over to R side during evaluation, looks over at L side with cues. Difficult to assess pt d/t cogntive status  -[facility] Name 05/02/22 1226          Range of Motion  Comprehensive    Comment, General Range of Motion Pt does move UE sponatously during encounter and pulling at lines, grabs OT gown sitting EOB but does not follow commands for UE ROM. When OT holds pt UEs into flexion she does not maintain hold of UEs against gravity.  -     Row Name 05/02/22 1226          Strength Comprehensive (MMT)    Comment, General Manual Muscle Testing (MMT) Assessment BUE > or = 3/5, does not appear to have signficant unilateral weakness but pt not following commands for MMT. Does not squeeze either hand to assess  strength when asked.  -[facility] Name 05/02/22 1226          Motor Skills    Motor Skills posture  -[facility] Name 05/02/22 1226          Balance    Static Sitting Balance maximum assist  -     Position, Sitting Balance sitting edge of bed  -[facility] Name 05/02/22 1226          Posture    Posture postural control  heavy posterior pushing sitting EOB.  -           User Key  (r) = Recorded By, (t) = Taken By, (c) = Cosigned By    Initials Name Provider Type     Tesha Charles OT Occupational Therapist               Goals/Plan     [facility] Name 05/02/22 1238          Transfer Goal 1 (OT)    Activity/Assistive Device (Transfer Goal 1, OT) transfers, all;commode, bedside without drop arms;sit-to-stand/stand-to-sit;bed-to-chair/chair-to-bed  -     [place] Level/Cues Needed (Transfer Goal 1, OT) moderate assist (50-74% patient effort);verbal cues required  -SM     Time Frame (Transfer Goal 1, OT) short term goal (STG);2 weeks  -SM     Progress/Outcome (Transfer Goal 1, OT) goal ongoing  -[facility] Name 05/02/22 1238          Bathing Goal 1 (OT)    Activity/Device (Bathing Goal 1, OT) bathing skills, all  -SM     [place] Level/Cues Needed (Bathing Goal 1, OT) moderate assist (50-74% patient effort);verbal cues required;tactile cues required  -SM     Time Frame (Bathing Goal 1, OT) short term goal (STG);2 weeks  -SM     Progress/Outcomes (Bathing Goal 1, OT)  goal ongoing  -[facility] Name 05/02/22 1238          Dressing Goal 1 (OT)    Activity/Device (Dressing Goal 1, OT) dressing skills, all;upper body dressing  -SM     [place]/Cues Needed (Dressing Goal 1, OT) moderate assist (50-74% patient effort)  -SM     Time Frame (Dressing Goal 1, OT) short term goal (STG);2 weeks  -SM     Progress/Outcome (Dressing Goal 1, OT) goal ongoing  -[facility] Name 05/02/22 1238          Grooming Goal 1 (OT)    Activity/Device (Grooming Goal 1, OT) grooming skills, all  -SM     [place] (Grooming Goal 1, OT) minimum assist (75% or more patient effort)  -SM     Time Frame (Grooming Goal 1, OT) short term goal (STG);2 weeks  -SM     Progress/Outcome (Grooming Goal 1, OT) goal ongoing  -SM     Row Name 05/02/22 1238          Therapy Assessment/Plan (OT)    Planned Therapy Interventions (OT) activity tolerance training;adaptive equipment training;cognitive/visual perception retraining;BADL retraining;functional balance retraining;IADL retraining;neuromuscular control/coordination retraining;patient/caregiver education/training;occupation/activity based interventions;transfer/mobility retraining;ROM/therapeutic exercise;strengthening exercise;passive ROM/stretching  -           User Key  (r) = Recorded By, (t) = Taken By, (c) = Cosigned By    Initials Name Provider Type    Tesha Ardon, OT Occupational Therapist               Clinical Impression     [facility] Name 05/02/22 1230          Pain Assessment    Pre/Posttreatment Pain Comment no c/o of pain but grimices and groaning.  -     Additional Documentation Pain Scale: FACES Pre/Post-Treatment (Group)  -[facility] Name 05/02/22 1230          Pain Scale: FACES Pre/Post-Treatment    Pain: FACES Scale, Pretreatment 4-->hurts little more  -SM     Posttreatment Pain Rating 4-->hurts little more  -SM     Row Name 05/02/22 1230          Plan of Care Review    Plan of Care Reviewed With patient  -     Outcome Evaluation Pt is a Michael  y.o female  w/hx of ESRD admitted to [facility] with AMS, found to have infected HD catheter and removed on 4/28 and replaced on 5/1. Post procedure she was found to have sudden onset of L sided weakness and aphasia, she recieved TNK, she developed sponatous L subcortical intracerebral hemorrhage 2/2 thrombolytics and TNK reversed. Today pt presents with receptive aphasia during OT evaluation, able to follow a few commands during assessment, she is very impulsive and needs constant cues for safety awareness once restraints were released to participate in OT. She requires max AX2 to sit EOB but posterior pushing and returned to bed for pt safety. She requires total A for ADLs at present 2/2 impaired cogntion, command following, postural control. Her UEs appear strong but she does not use them with max cues for washing her face today with OT. She would benefit from continued OT to address these deficits and increase independence with ADLs/transfers. Recommend dc to rehab. Acute rehab vs [facility] pending progress with therapies.  -     Row Name 05/02/22 1231          Therapy Assessment/Plan (OT)    Rehab Potential (OT) good, to achieve stated therapy goals  -     Criteria for Skilled Therapeutic Interventions Met (OT) yes;skilled treatment is necessary  -     Therapy Frequency (OT) 5 times/wk  -     Row Name 05/02/22 5790          Therapy Plan Review/Discharge Plan (OT)    Anticipated Discharge Disposition (OT) inpatient rehabilitation facility;sub acute care setting  -     Row Name 05/02/22 1236          Positioning and Restraints    Pre-Treatment Position in bed  -SM     Post Treatment Position bed  -SM     In Bed fowlers;call light within reach;encouraged to call for assist;exit alarm on;notified nsg;with SLP  -SM     Restraints reapplied:;soft limb  -           User Key  (r) = Recorded By, (t) = Taken By, (c) = Cosigned By    Initials Name Provider Type    Tesha Ardon, OT Occupational Therapist                Outcome Measures     Row Name 05/02/22 1239          How much help from another is currently needed...    Putting on and taking off regular lower body clothing? 1  -SM     Bathing (including washing, rinsing, and drying) 1  -SM     Toileting (which includes using toilet bed pan or urinal) 1  -SM     Putting on and taking off regular upper body clothing 1  -SM     Taking care of personal grooming (such as brushing teeth) 1  -SM     Eating meals 1  -     AM-PAC 6 Clicks Score (OT) 6  -     Row Name 05/02/22 1119          How much help from another person do you currently need...    Turning from your back to your side while in flat bed without using bedrails? 1  -CH (r) KA (t) CH (c)     Moving from lying on back to sitting on the side of a flat bed without bedrails? 1  -CH (r) KA (t) CH (c)     Moving to and from a bed to a chair (including a wheelchair)? 1  -CH (r) KA (t) CH (c)     Standing up from a chair using your arms (e.g., wheelchair, bedside chair)? 1  -CH (r) KA (t) CH (c)     Climbing 3-5 steps with a railing? 1  -CH (r) KA (t) CH (c)     To walk in hospital room? 1  -CH (r) KA (t) CH (c)     AM-PAC 6 Clicks Score (PT) 6  -CH (r) KA (t)     Highest level of mobility 2 --> Turned self in bed/ bed activities  -CH (r) KA (t)     Row Name 05/02/22 1239          Modified [place] Scale    Modified [place] Scale 5 - Severe disability.  Bedridden, incontinent, and requiring constant nursing care and attention.  -     Row Name 05/02/22 1239 05/02/22 1119       Functional Assessment    Outcome Measure Options AM-PAC 6 Clicks Daily Activity (OT);Modified [place]  - AM-PAC 6 Clicks Basic Mobility (PT)  -CH (r) KA (t) CH (c)          User Key  (r) = Recorded By, (t) = Taken By, (c) = Cosigned By    Initials Name Provider Type     Lisa Nguyen, PT Physical Therapist    Tesha Ardon, OT Occupational Therapist    KA Ángel, Mary, PT Student PT Student                Occupational Therapy Education                  Title: PT OT SLP Therapies (In Progress)     Topic: Occupational Therapy (Not Started)     Point: ADL training (Not Started)     Description:   Instruct learner(s) on proper safety adaptation and remediation techniques during self care or transfers.   Instruct in proper use of assistive devices.              Learner Progress:  Not documented in this visit.          Point: Home exercise program (Not Started)     Description:   Instruct learner(s) on appropriate technique for monitoring, assisting and/or progressing therapeutic exercises/activities.              Learner Progress:  Not documented in this visit.          Point: Precautions (Not Started)     Description:   Instruct learner(s) on prescribed precautions during self-care and functional transfers.              Learner Progress:  Not documented in this visit.          Point: Body mechanics (Not Started)     Description:   Instruct learner(s) on proper positioning and spine alignment during self-care, functional mobility activities and/or exercises.              Learner Progress:  Not documented in this visit.                          OT Recommendation and Plan  Planned Therapy Interventions (OT): activity tolerance training, adaptive equipment training, cognitive/visual perception retraining, BADL retraining, functional balance retraining, IADL retraining, neuromuscular control/coordination retraining, patient/caregiver education/training, occupation/activity based interventions, transfer/mobility retraining, ROM/therapeutic exercise, strengthening exercise, passive ROM/stretching  Therapy Frequency (OT): 5 times/wk  Plan of Care Review  Plan of Care Reviewed With: patient  Outcome Evaluation: Pt is a 56 y.o female  w/hx of ESRD admitted to [facility] with AMS, found to have infected HD catheter and removed on 4/28 and replaced on 5/1. Post procedure she was found to have sudden onset of L sided weakness and aphasia, she recieved TNK, she developed  sponatous L subcortical intracerebral hemorrhage 2/2 thrombolytics and TNK reversed. Today pt presents with receptive aphasia during OT evaluation, able to follow a few commands during assessment, she is very impulsive and needs constant cues for safety awareness once restraints were released to participate in OT. She requires max AX2 to sit EOB but posterior pushing and returned to bed for pt safety. She requires total A for ADLs at present 2/2 impaired cogntion, command following, postural control. Her UEs appear strong but she does not use them with max cues for washing her face today with OT. She would benefit from continued OT to address these deficits and increase independence with ADLs/transfers. Recommend dc to rehab. Acute rehab vs [facility] pending progress with therapies.     Time Calculation:    Time Calculation- OT     Row Name 05/02/22 1241             Time Calculation- OT    OT Start Time 0919  -      OT Stop Time 0942  -      OT Time Calculation (min) 23 min  -SM      Total Timed Code Minutes- OT 8 minute(s)  -SM      OT Received On 05/02/22  -      OT - Next Appointment 05/03/22  -      OT Goal Re-Cert Due Date 05/16/22  -              Timed Charges    61024 - OT Therapeutic Activity Minutes 8  -SM              Untimed Charges    OT Eval/Re-eval Minutes 15  -SM              Total Minutes    Timed Charges Total Minutes 8  -SM      Untimed Charges Total Minutes 15  -SM       Total Minutes 23  -SM            User Key  (r) = Recorded By, (t) = Taken By, (c) = Cosigned By    Initials Name Provider Type     Tesha Charles, OT Occupational Therapist              Therapy Charges for Today     Code Description Service Date Service Provider Modifiers Qty    59100652166  OT THERAPEUTIC ACT EA 15 MIN 5/2/2022 Tesha Charles OT GO 1    09609532112 HC OT EVAL MOD COMPLEXITY 2 5/2/2022 Tesha Charles OT GO 1               Tesha Charles OT  5/2/2022

## 2022-05-02 NOTE — PLAN OF CARE
Goal Outcome Evaluation:  Plan of Care Reviewed With: patient           Outcome Evaluation: Pt is a 56 y.o female  w/hx of ESRD admitted to [facility] with AMS, found to have infected HD catheter and removed on 4/28 and replaced on 5/1. Post procedure she was found to have sudden onset of L sided weakness and aphasia, she recieved TNK, she developed sponatous L subcortical intracerebral hemorrhage 2/2 thrombolytics and TNK reversed. Today pt presents with receptive aphasia during OT evaluation, able to follow a few commands during assessment, she is very impulsive and needs constant cues for safety awareness once restraints were released to participate in OT. She requires max AX2 to sit EOB but posterior pushing and returned to bed for pt safety. She requires total A for ADLs at present 2/2 impaired cogntion, command following, postural control. Her UEs appear strong but she does not use them with max cues for washing her face today with OT. She would benefit from continued OT to address these deficits and increase independence with ADLs/transfers. Recommend dc to rehab. Acute rehab vs [facility] pending progress with therapies.    OT wore a mask, eye protection, gloves, and completed hand hygiene.

## 2022-05-02 NOTE — PROGRESS NOTES
"                                              LOS: 5 days   Patient Care Team:  Karson Oreilly MD as PCP - General (Family Medicine)    Chief Complaint:  F/up stroke, intracranial hemorrhage and critical care management    Subjective   Interval History      Blood pressure remains elevated.  On nicardipine drip  Fluctuating aphasia and agitation. On Precedex  Afebrile.  She does not follow commands consistently per staff.  She does move her extremities spontaneously and her neuro assessment has not changed much.  She has comprehensible speech sometimes       REVIEW OF SYSTEMS:   Cannot obtain due to altered mental status    Ventilator/Non-Invasive Ventilation Settings (From admission, onward)            None                Physical Exam:     Vital Signs  Temp:  [98.2 °F (36.8 °C)-99.2 °F (37.3 °C)] 99.1 °F (37.3 °C)  Heart Rate:  [56-96] 74  BP: (103-143)/() 126/78    Intake/Output Summary (Last 24 hours) at 5/2/2022 1647  Last data filed at 5/2/2022 1200  Gross per 24 hour   Intake 1379.43 ml   Output --   Net 1379.43 ml     Flowsheet Rows    Flowsheet Row First Filed Value   Admission Height 157.5 cm (62\") Documented at 04/28/2022 0427   Admission Weight 54.8 kg (120 lb 13 oz) Documented at 04/28/2022 0427          PPE used per hospital policy    General Appearance:   Alert. Not cooperative.  In no distress.   ENMT:  Gibson 4.  Moist tongue   Eyes:  Pupils equal and reactive to light. EOMI   Neck:    Trachea midline. No thyromegaly.   Lungs:    Clear to auscultation,respirations regular, even and nonlabored    Heart:   Regular rhythm and normal rate, normal S1 and S2, no         murmur   Skin:   No rash or ecchymosis   Abdomen:     Soft. No tenderness. No HSM.   Neuro/psych:  Conscious, alert.  Moves all extremities spontaneously but seems to be weaker in the left arm.  Not cooperative at the time of eval   Extremities:  No cyanosis, clubbing or edema.  Warm extremities and well-perfused          Results " Review:        Results from last 7 days   Lab Units 05/02/22  0329 05/01/22  1050 04/30/22  0327   SODIUM mmol/L 130* 132* 130*   POTASSIUM mmol/L 3.9 4.6 4.9   CHLORIDE mmol/L 95* 96* 93*   CO2 mmol/L 22.3 21.3* 22.0   BUN mg/dL 26* 41* 36*   CREATININE mg/dL 4.23* 6.22* 4.89*   GLUCOSE mg/dL 119* 99 94   CALCIUM mg/dL 7.9* 8.0* 8.0*         Results from last 7 days   Lab Units 05/02/22  0329 05/01/22  1050 04/29/22  2317   WBC 10*3/mm3 10.55 12.18* 17.03*   HEMOGLOBIN g/dL 10.8* 10.1* 11.0*   HEMATOCRIT % 32.3* 29.6* 31.9*   PLATELETS 10*3/mm3 203 172 150     Results from last 7 days   Lab Units 04/29/22  2317 04/27/22  1615   INR  0.97 1.02   APTT seconds 29.4 28.2                       Results from last 7 days   Lab Units 04/27/22  1648   PH, ARTERIAL pH units 7.395   PCO2, ARTERIAL mm Hg 44.0   PO2 ART mm Hg 52.8*   MODALITY  Room Air   O2 SATURATION CALC % 86.6*         I reviewed the patient's new clinical results.        Medication Review:   [START ON 5/3/2022] amLODIPine, 10 mg, Oral, Q24H  amLODIPine, 5 mg, Oral, Once  atorvastatin, 40 mg, Oral, Nightly  cloNIDine, 0.2 mg, Nasogastric, Q6H  heparin (porcine), 3,000 Units, Intracatheter, Once  hydrALAZINE, 10 mg, Oral, Q8H  insulin lispro, 0-7 Units, Subcutaneous, 4x Daily With Meals & Nightly  levothyroxine, 125 mcg, Oral, Q AM  OLANZapine, 5 mg, Oral, BID  pantoprazole, 40 mg, Intravenous, Q AM  rOPINIRole, 0.75 mg, Oral, Nightly  sertraline, 100 mg, Oral, Daily  sodium chloride, 10 mL, Intravenous, Q12H  tamsulosin, 0.4 mg, Oral, Daily  Vancomycin Pharmacy Intermittent Dosing, , Does not apply, Daily        dexmedetomidine, 0.2-1.5 mcg/kg/hr, Last Rate: 0.9 mcg/kg/hr (05/02/22 1627)  niCARdipine, 5-15 mg/hr, Last Rate: Stopped (05/02/22 1400)  Pharmacy Consult,   Pharmacy to dose vancomycin,         Diagnostic imaging:  I personally and independently reviewed the following images:  CT brain 4/30/2022 compared to 4/29/2022: Slight enlargement in the left  frontal hemorrhage.  No midline shift or mass-effect      CXR 5/1/2022: Increased pulmonary vascular markings.  Dialysis catheter in good position.      CT head 5/1/2022: No change in intracranial hemorrhage.    Assessment     1. Right MCA stroke, likely embolic, s/p TNKase  2. Spontaneous left frontal intracerebral hemorrhage  3. ESRD on HD TTS  4. Hemodialysis cath infection with MRSA, removed.  New Shiley catheter and right IJ inserted 5/1  5. Sepsis, resolved  6. Hyponatremia, clinically significant  7. Encephalopathy, metabolic  8. Pulmonary hypertension on echo 4/29/2022, RVSP 48.  Moderate to severely enlarged LA.  Grade 2 diastolic dysfunction.  9. Pulmonary vascular congestion/fluid overload  10. Mild MR    Pertinent medical problems:  · DM type II  · CAD  · Anemia of CKD  · HTN        Plan     · Nicardipine drip to keep SBP <150.  Amlodipine 5 mg daily. Start Clonidine  · Precedex.  Titrate to wean off. Clonidine taper will be started  · ICU care.  Neuro check. EEG. Repeat MRI in 3 months  · Antibiotics with vancomycin.  Repeat blood culture x2 4/28 negative so far.  Echo showed no evidence of endocarditis.  fever resolved. ID recommending ANJEL.   · HD tomorrow  · Tube feeding. Speech eval  · Off Aspirin Plavix due to bleed  · Insulin SC PRN per accu check      Discussed with ICU staff    Alex Randall MD  05/02/22  16:47 EDT      Time: Critical care 34 min      This note was dictated utilizing [employer] dictation

## 2022-05-02 NOTE — PROGRESS NOTES
"[facility] Clinical Pharmacy Services: Vancomycin Monitoring Note    Zaina Martinez is a 56 y.o. female who is on day 4/7 of pharmacy to dose vancomycin for SSTI per Dr. Soriano's request. Dr. Canas following for sepsis and HD catheter-related infection w/MRSA.    Previous Vancomycin Dose:   500 mg IV every once 5/1 @ 1711  Updated Cultures and Sensitivities: Catheter culture MRSA, blood cx NGTD x 2 @ 4 days  Results from last 7 days   Lab Units 05/02/22  0329 05/01/22  0329 04/30/22  0327   VANCOMYCIN RM mcg/mL 19.70 16.80 12.70     Vitals/Labs  Ht: 157.5 cm (62.01\"); Wt: 57.8 kg (127 lb 6.8 oz)   Temp Readings from Last 1 Encounters:   05/02/22 99.1 °F (37.3 °C) (Oral)     Estimated Creatinine Clearance: 13.6 mL/min (A) (by C-G formula based on SCr of 4.23 mg/dL (H)).   Dialysis TTS    Results from last 7 days   Lab Units 05/02/22  0329 05/01/22  1050 04/30/22  0327 04/29/22  2317   CREATININE mg/dL 4.23* 6.22* 4.89*  --    WBC 10*3/mm3 10.55 12.18*  --  17.03*     Assessment/Plan    Current Vancomycin Dose: intermittent due to ESRD/HD. Will plan for HD on 5/3.  Next Level Date and Time: Vanc Random on 5/3 at AM labs.  We will continue to monitor patient changes and renal function     Thank you for involving pharmacy in this patient's care. Please contact pharmacy with any questions or concerns.       Reji Lopez, PharmD  Pharmacy Resident  05/02/22 11:48 EDT            "

## 2022-05-02 NOTE — THERAPY EVALUATION
Acute Care - Speech Language Pathology   Swallow Initial Evaluation [facility]     Patient Name: Zaina Martinez  : 1965  MRN: [id number]  Today's Date: 2022               Admit Date: 2022    Visit Dx:     ICD-10-CM ICD-9-CM   1. Acute metabolic encephalopathy  G93.41 348.31   2. ESRD (end stage renal disease) on dialysis ([facility])  N18.6 585.6    Z99.2 V45.11   3. Acute hypoxemic respiratory failure ([facility])  J96.01 518.81   4. Hyperkalemia  E87.5 276.7   5. ESRD (end stage renal disease) ([facility])  N18.6 585.6     Patient Active Problem List   Diagnosis   • Renal insufficiency   • Hypertensive disorder   • Hypothyroidism   • Acute DM type 2 causing complication ([facility])   • Rheumatoid arthritis ([facility])   • Angioedema   • Esophageal dysmotility   • Anemia   • Medically noncompliant   • Myocardial infarction due to demand ischemia ([facility])   • Enteritis   • PRES (posterior reversible encephalopathy syndrome)   • Urine retention   • Klebsiella infection   • Superficial thrombophlebitis   • Generalized weakness   • ESRD (end stage renal disease) ([facility])   • CAD (coronary artery disease)   • Abnormal urinalysis   • Acute on chronic diastolic CHF (congestive heart failure) ([facility])   • Pyelonephritis   • Calculus of gallbladder with acute on chronic cholecystitis without obstruction   • Pleural effusion on right   • Anemia due to chronic kidney disease, on chronic dialysis ([facility])   • Abnormal findings on diagnostic imaging of other specified body structures   • Acute upper respiratory infection   • Agitation   • Alkaline phosphatase raised   • Casts present in urine   • Cellulitis of toe   • Hip pain   • Community acquired pneumonia   • Depressive disorder   • Diarrhea of presumed infectious origin   • Difficult or painful urination   • Disease due to severe acute respiratory syndrome coronavirus 2 (SARS-CoV-2)   • Dyspnea   • Encounter for follow-up examination after completed treatment for conditions other than malignant  neoplasm   • H/O: hypothyroidism   • Hyperlipidemia   • Hypomagnesemia   • Intractable vomiting with nausea   • Leukocytosis   • Luetscher's syndrome   • Need for influenza vaccination   • Restless legs   • Noncompliance with treatment   • Shoulder pain   • Urinary tract infectious disease   • Metabolic encephalopathy   • Abnormal findings on diagnostic imaging of abdomen   • Status post cholecystectomy   • Hyponatremia   • Leukocytosis   • Acute metabolic encephalopathy   • Encephalopathy, toxic   • Acute CVA (cerebrovascular accident) ([facility])   • Intracranial hemorrhage ([facility])     Past Medical History:   Diagnosis Date   • Acute CVA (cerebrovascular accident) ([facility]CC) 5/1/2022   • Acute on chronic diastolic CHF (congestive heart failure) ([facility])    • CAD (coronary artery disease) 12/20/2021   • Diabetes (HCC)    • Disease of thyroid gland    • GERD (gastroesophageal reflux disease)    • Hyperlipidemia 11/30/2018   • Hypertension    • Rheumatoid arthritis (HCC)      Past Surgical History:   Procedure Laterality Date   • CHOLECYSTECTOMY WITH INTRAOPERATIVE CHOLANGIOGRAM N/A 1/10/2022    Procedure: Laparoscopic cholecystectomy with intraoperative cholangiogram;  Surgeon: Ramana Raygoza MD;  Location: [facility];  Service: General;  Laterality: N/A;   • EYE SURGERY     • HYSTERECTOMY     • INSERTION HEMODIALYSIS CATHETER N/A 12/6/2021    Procedure: HEMODIALYSIS CATHETER INSERTION;  Surgeon: Keli Salazar MD;  Location: [facility] 18/19;  Service: Vascular;  Laterality: N/A;       SLP Recommendation and Plan  SLP Swallowing Diagnosis: unable/unwilling to cooperate (05/02/22 0900)  SLP Diet Recommendation: thin liquids (05/02/22 0900)  Recommended Precautions and Strategies: assist with feeding (05/02/22 0900)  SLP Rec. for Method of Medication Administration: meds whole, with thin liquids, with pudding or applesauce, as tolerated, meds via alternate route (05/02/22 0900)     Monitor for Signs of  "Aspiration: yes, right lower lobe infiltrates (05/02/22 0900)  Recommended Diagnostics: reassess via clinical swallow evaluation (05/02/22 0900)  Swallow Criteria for Skilled Therapeutic Interventions Met: demonstrates skilled criteria (05/02/22 0900)  Anticipated Discharge Disposition (SLP): anticipate therapy at next level of care (05/02/22 0900)  Rehab Potential/Prognosis, Swallowing: good, to achieve stated therapy goals (05/02/22 0900)  Therapy Frequency (Swallow): PRN (05/02/22 0900)  Predicted Duration Therapy Intervention (Days): until discharge (05/02/22 0900)                                  Plan of Care Reviewed With: patient  Outcome Evaluation: Patient seen for clinical swallow assessment. Pt confused, agitated, nearly pulled out cortrak upon SLP's arrival despite restraints. Not oriented to name. Would not follow commands. Able to repeat at times, question echolalia. Speech often fluent, non sensical. Pt refused all PO, but did accept thins via straw when presented to oral cavity. Voice clear post swallow. Laryngeal elevation adequate. Pt turned head from all trials of puree. Pt did attempt to bite a cracker once, but unable to coordinate biting this date. Question some oral apraxia, confusion vs refusal. Patient appears safe for thins. Can allow at this time. Doubt patient will have adequate intake d/t refusals, will defer diet entry to team.      SWALLOW EVALUATION (last 72 hours)     SLP Adult Swallow Evaluation     Row Name 05/02/22 0900                   Rehab Evaluation    Document Type evaluation  -SH        Patient Observations poorly cooperative  -SH        Patient Effort poor  -SH                  General Information    Patient Profile Reviewed yes  -SH        Pertinent History Of Current Problem \"Her left frontal ICH is atypical, as her diffusion was negative in the initial MRI, although it did have some flair changes, which seems to have bled and caused her to have ICH.  Patient continues " "to be encephalopathic, and given her mental status changes, I would recommend getting an EEG.\" per neuro.  -        Current Method of Nutrition NPO;nasogastric feedings  -        Precautions/Limitations, Vision other (see comments)  unable to assess  -        Precautions/Limitations, Hearing WFL;for purposes of eval  -        Prior Level of Function-Communication unknown  -        Prior Level of Function-Swallowing unknown  -        Plans/Goals Discussed with patient  -        Barriers to Rehab cognitive status  -                  Pain    Additional Documentation Pain Scale: Word Pre/Post-Treatment (Group)  unable to rate pain  -                  Oral Musculature and Cranial Nerve Assessment    Oral Motor General Assessment unable to assess  -                  Clinical Swallow Eval    Clinical Swallow Evaluation Summary Patient seen for clinical swallow assessment. Pt confused, agitated, nearly pulled out cortrak upon SLP's arrival despite restraints. Not oriented to name. Would not follow commands. Able to repeat at times, question echolalia. Speech often fluent, non sensical. Pt refused all PO, but did accept thins via straw when presented to oral cavity. Voice clear post swallow. Laryngeal elevation adequate. Pt turned head from all trials of puree. Pt did attempt to bite a cracker once, but unable to coordinate biting through the cracker this date. Question some oral apraxia, confusion vs refusal. Patient appears safe for thins. Can allow at this time. Doubt patient will have adequate intake d/t refusals, will defer diet entry to team.  -                  SLP Evaluation Clinical Impression    SLP Swallowing Diagnosis unable/unwilling to cooperate  -        Functional Impact risk of aspiration/pneumonia  -        Rehab Potential/Prognosis, Swallowing good, to achieve stated therapy goals  -        Swallow Criteria for Skilled Therapeutic Interventions Met demonstrates skilled criteria  " -                  Recommendations    Therapy Frequency (Swallow) PRN  -        Predicted Duration Therapy Intervention (Days) until discharge  -        SLP Diet Recommendation thin liquids  -        Recommended Diagnostics reassess via clinical swallow evaluation  -        Recommended Precautions and Strategies assist with feeding  -        Oral Care Recommendations Oral Care BID/PRN  -        SLP Rec. for Method of Medication Administration meds whole;with thin liquids;with pudding or applesauce;as tolerated;meds via alternate route  -        Monitor for Signs of Aspiration yes;right lower lobe infiltrates  -        Anticipated Discharge Disposition (SLP) anticipate therapy at next level of care  -                  Swallow Goals (SLP)    Oral Nutrition/Hydration Goal Selection (SLP) oral nutrition/hydration, SLP goal 1  -                  Oral Nutrition/Hydration Goal 1 (SLP)    Oral Nutrition/Hydration Goal 1, SLP Pt will iraida thins without overt s/s of aspiration.  -        Time Frame (Oral Nutrition/Hydration Goal 1, SLP) by discharge  -              User Key  (r) = Recorded By, (t) = Taken By, (c) = Cosigned By    Initials Name Effective Dates     Tesha Saucedo MS CCC-SLP 06/16/21 -                 EDUCATION  The patient has been educated in the following areas:   Dysphagia (Swallowing Impairment).        SLP GOALS     Row Name 05/02/22 0900             Oral Nutrition/Hydration Goal 1 (SLP)    Oral Nutrition/Hydration Goal 1, SLP Pt will iraida thins without overt s/s of aspiration.  -      Time Frame (Oral Nutrition/Hydration Goal 1, SLP) by discharge  -            User Key  (r) = Recorded By, (t) = Taken By, (c) = Cosigned By    Initials Name Provider Type    Tesha Villarreal MS CCC-SLP Speech and Language Pathologist              SLP Outcome Measures (last 72 hours)     SLP Outcome Measures     Row Name 05/02/22 1400             SLP Outcome Measures    Outcome Measure Used?  Adult NOMS  -              Adult FCM Scores    FCM Chosen Swallowing  -      Swallowing FCM Score 4  -            User Key  (r) = Recorded By, (t) = Taken By, (c) = Cosigned By    Initials Name Effective Dates     Tesha Saucedo MS CCC-SLP 06/16/21 -                  Time Calculation:    Time Calculation- SLP     Row Name 05/02/22 1411             Time Calculation- SLP    SLP Start Time 0900  -      SLP Received On 05/02/22  -              Untimed Charges    63588-OO Eval Oral Pharyng Swallow Minutes 60  -              Total Minutes    Untimed Charges Total Minutes 60  -       Total Minutes 60  -SH            User Key  (r) = Recorded By, (t) = Taken By, (c) = Cosigned By    Initials Name Provider Type     Tesha Saucedo MS CCC-SLP Speech and Language Pathologist                Therapy Charges for Today     Code Description Service Date Service Provider Modifiers Qty    [id number] HC ST EVAL ORAL PHARYNG SWALLOW 4 5/2/2022 Tesha Saucedo MS CCC-SLP GN 1               Tesha Saucedo MS CCC-EUFEMIA  5/2/2022

## 2022-05-02 NOTE — THERAPY EVALUATION
Patient Name: Zaina Martinez  : 1965    MRN: [id number]                              Today's Date: 2022       Admit Date: 2022    Visit Dx:     ICD-10-CM ICD-9-CM   1. Acute metabolic encephalopathy  G93.41 348.31   2. ESRD (end stage renal disease) on dialysis ([facility])  N18.6 585.6    Z99.2 V45.11   3. Acute hypoxemic respiratory failure ([facility])  J96.01 518.81   4. Hyperkalemia  E87.5 276.7   5. ESRD (end stage renal disease) ([facility])  N18.6 585.6     Patient Active Problem List   Diagnosis   • Renal insufficiency   • Hypertensive disorder   • Hypothyroidism   • Acute DM type 2 causing complication ([facility])   • Rheumatoid arthritis ([facility])   • Angioedema   • Esophageal dysmotility   • Anemia   • Medically noncompliant   • Myocardial infarction due to demand ischemia ([facility])   • Enteritis   • PRES (posterior reversible encephalopathy syndrome)   • Urine retention   • Klebsiella infection   • Superficial thrombophlebitis   • Generalized weakness   • ESRD (end stage renal disease) ([facility])   • CAD (coronary artery disease)   • Abnormal urinalysis   • Acute on chronic diastolic CHF (congestive heart failure) ([facility])   • Pyelonephritis   • Calculus of gallbladder with acute on chronic cholecystitis without obstruction   • Pleural effusion on right   • Anemia due to chronic kidney disease, on chronic dialysis ([facility])   • Abnormal findings on diagnostic imaging of other specified body structures   • Acute upper respiratory infection   • Agitation   • Alkaline phosphatase raised   • Casts present in urine   • Cellulitis of toe   • Hip pain   • Community acquired pneumonia   • Depressive disorder   • Diarrhea of presumed infectious origin   • Difficult or painful urination   • Disease due to severe acute respiratory syndrome coronavirus 2 (SARS-CoV-2)   • Dyspnea   • Encounter for follow-up examination after completed treatment for conditions other than malignant neoplasm   • H/O: hypothyroidism   • Hyperlipidemia   •  Hypomagnesemia   • Intractable vomiting with nausea   • Leukocytosis   • Luetscher's syndrome   • Need for influenza vaccination   • Restless legs   • Noncompliance with treatment   • Shoulder pain   • Urinary tract infectious disease   • Metabolic encephalopathy   • Abnormal findings on diagnostic imaging of abdomen   • Status post cholecystectomy   • Hyponatremia   • Leukocytosis   • Acute metabolic encephalopathy   • Encephalopathy, toxic   • Acute CVA (cerebrovascular accident) ([facility])   • Intracranial hemorrhage ([facility])     Past Medical History:   Diagnosis Date   • Acute CVA (cerebrovascular accident) (HCC) 5/1/2022   • Acute on chronic diastolic CHF (congestive heart failure) (HCC)    • CAD (coronary artery disease) 12/20/2021   • Diabetes (HCC)    • Disease of thyroid gland    • GERD (gastroesophageal reflux disease)    • Hyperlipidemia 11/30/2018   • Hypertension    • Rheumatoid arthritis (HCC)      Past Surgical History:   Procedure Laterality Date   • CHOLECYSTECTOMY WITH INTRAOPERATIVE CHOLANGIOGRAM N/A 1/10/2022    Procedure: Laparoscopic cholecystectomy with intraoperative cholangiogram;  Surgeon: Ramana Raygoza MD;  Location: [facility];  Service: General;  Laterality: N/A;   • EYE SURGERY     • HYSTERECTOMY     • INSERTION HEMODIALYSIS CATHETER N/A 12/6/2021    Procedure: HEMODIALYSIS CATHETER INSERTION;  Surgeon: Keli Salazar MD;  Location: [facility] 18/19;  Service: Vascular;  Laterality: N/A;      General Information     Row Name 05/02/22 1051          Physical Therapy Time and Intention    Document Type evaluation (P)   -KA     Mode of Treatment co-treatment;occupational therapy;physical therapy (P)   -KA     Row Name 05/02/22 3465          General Information    Patient Profile Reviewed yes (P)   -KA     Prior Level of Function independent:;community mobility;gait;transfer;w/c or scooter;bed mobility (P)   -KA     Existing Precautions/Restrictions fall (P)   -KA     Barriers  to Rehab medically complex (P)   -KA     Row Name 05/02/22 1059          Living Environment    People in Home spouse;grandchild(pro) (P)   -KA     Row Name 05/02/22 1059          Cognition    Orientation Status (Cognition) oriented to;person;unable/difficult to assess;other (see comments) (P)   aphasic  -KA     Row Name 05/02/22 1059          Safety Issues, Functional Mobility    Safety Issues Affecting Function (Mobility) ability to follow commands;at risk behavior observed;awareness of need for assistance;impulsivity;insight into deficits/self-awareness;judgment;safety precautions follow-through/compliance;safety precaution awareness (P)   -KA     Impairments Affecting Function (Mobility) balance;cognition;endurance/activity tolerance;grasp;postural/trunk control;strength (P)   -KA     Cognitive Impairments, Mobility Safety/Performance attention;awareness, need for assistance;insight into deficits/self-awareness;impulsivity;safety precaution awareness;safety precaution follow-through (P)   -KA           User Key  (r) = Recorded By, (t) = Taken By, (c) = Cosigned By    Initials Name Provider Type    Mary Prajapati, PT Student PT Student               Mobility     Row Name 05/02/22 1102          Bed Mobility    Bed Mobility supine-sit;sit-supine (P)   -KA     Supine-Sit [place] (Bed Mobility) maximum assist (25% patient effort);2 person assist (P)   -MICKEY     Sit-Supine [place] (Bed Mobility) maximum assist (25% patient effort);2 person assist (P)   -KA     Assistive Device (Bed Mobility) draw sheet;head of bed elevated (P)   -KA     Comment, (Bed Mobility) Pt required Max VCs to follow commands and needed extra assistance by third person as pt started sliding off bed. Pt required knee blocking to prevent further sliding. (P)   -KA     Row Name 05/02/22 1102          Transfers    Comment, (Transfers) Not appropriate on this date (P)   -KA     Row Name 05/02/22 1102          Bed-Chair Transfer    Bed-Chair  [place] (Transfers) not tested (P)   -KA           User Key  (r) = Recorded By, (t) = Taken By, (c) = Cosigned By    Initials Name Provider Type    Mary Prajapati, PT Student PT Student               Obj/Interventions     Row Name 05/02/22 1104          Range of Motion Comprehensive    General Range of Motion bilateral lower extremity ROM WFL (P)   -[place] [facility] Name 05/02/22 1104          Strength Comprehensive (MMT)    General Manual Muscle Testing (MMT) Assessment lower extremity strength deficits identified (P)   -MICKEY     Comment, General Manual Muscle Testing (MMT) Assessment Max VCs to assess strength. B LE 3+/5 (P)   -     Row Name 05/02/22 1104          Motor Skills    Motor Skills functional endurance;posture (P)   -     Functional Endurance Poor (P)   -KA     Row Name 05/02/22 1104          Balance    Balance Assessment sitting static balance (P)   -     Static Sitting Balance maximum assist;2-person assist (P)   -MICKEY     Position, Sitting Balance sitting edge of bed (P)   -     Balance Interventions sitting (P)   -MICKEY     Comment, Balance Pt required Max A secondary to leaning back with force and needed knees blocked to prevent further sliding EOB. (P)   -KA           User Key  (r) = Recorded By, (t) = Taken By, (c) = Cosigned By    Initials Name Provider Type    Mary Prajapati, PT Student PT Student               Goals/Plan     Row Name 05/02/22 1119          Bed Mobility Goal 1 (PT)    Activity/Assistive Device (Bed Mobility Goal 1, PT) bed mobility activities, all (P)   -KA     [place] Level/Cues Needed (Bed Mobility Goal 1, PT) moderate assist (50-74% patient effort) (P)   -KA     Time Frame (Bed Mobility Goal 1, PT) 1 week (P)   -KA     Row Name 05/02/22 1119          Transfer Goal 1 (PT)    Activity/Assistive Device (Transfer Goal 1, PT) sit-to-stand/stand-to-sit;bed-to-chair/chair-to-bed (P)   -KA     [place] Level/Cues Needed (Transfer Goal 1, PT) moderate assist (50-74%  patient effort) (P)   -KA     Time Frame (Transfer Goal 1, PT) 1 week (P)   -KA           User Key  (r) = Recorded By, (t) = Taken By, (c) = Cosigned By    Initials Name Provider Type    Mary Prajapati, PT Student PT Student               Clinical Impression     Row Name 05/02/22 1107          Pain    Pre/Posttreatment Pain Comment Unable to assess on this date. (P)   -KA     Pain Intervention(s) Repositioned (P)   -KA     Row Name 05/02/22 1107          Plan of Care Review    Plan of Care Reviewed With patient (P)   -KA     Outcome Evaluation Pt is a 57 YO F admitted for acute metabolic encephalopathy and ICH. Pt A&O to name and responded but unable to assess any further. Pt able to follow commands <25% of the time. Pt presents with decreased strength, mobility, and transfers. Pt demonstrated bed mobility Max Ax2 with max cuing to sit EOB. Static sitting balance Max Ax2 to knee block B LEs secondary to pt scooting towards EOB. Third person assist to return pt to supine for safety. Pt would repeat phrases throughout session and become slightly agitated towards end of treatment. Pt returned to restraints for safety. PT will continue to follow to address strength, mobility, and transfers. (P)   -KA     Row Name 05/02/22 1107          Therapy Assessment/Plan (PT)    Rehab Potential (PT) fair, will monitor progress closely (P)   -KA     Criteria for Skilled Interventions Met (PT) yes;skilled treatment is necessary (P)   -KA     Therapy Frequency (PT) 5 times/wk (P)   -KA     Row Name 05/02/22 1107          Vital Signs    Pre Patient Position Supine (P)   -KA     Intra Patient Position Sitting (P)   -KA     Post Patient Position Supine (P)   -KA     Row Name 05/02/22 1107          Positioning and Restraints    Pre-Treatment Position in bed (P)   -KA     Post Treatment Position bed (P)   -KA     In Bed supine;call light within reach;encouraged to call for assist;exit alarm on;patient within staff view (P)   -KA      Restraints reapplied:;soft limb (P)   -MICKEY           User Key  (r) = Recorded By, (t) = Taken By, (c) = Cosigned By    Initials Name Provider Type    Mary Prajapait, PT Student PT Student               Outcome Measures     Row Name 05/02/22 1119          How much help from another person do you currently need...    Turning from your back to your side while in flat bed without using bedrails? 1 (P)   -KA     Moving from lying on back to sitting on the side of a flat bed without bedrails? 1 (P)   -KA     Moving to and from a bed to a chair (including a wheelchair)? 1 (P)   -KA     Standing up from a chair using your arms (e.g., wheelchair, bedside chair)? 1 (P)   -KA     Climbing 3-5 steps with a railing? 1 (P)   -KA     To walk in hospital room? 1 (P)   -KA     AM-PAC 6 Clicks Score (PT) 6 (P)   -KA     Highest level of mobility 2 --> Turned self in bed/ bed activities (P)   -MICKEY     Row Name 05/02/22 1119          Functional Assessment    Outcome Measure Options AM-PAC 6 Clicks Basic Mobility (PT) (P)   -MICKEY           User Key  (r) = Recorded By, (t) = Taken By, (c) = Cosigned By    Initials Name Provider Type    Mary Prajapati, PT Student PT Student                             Physical Therapy Education                 Title: PT OT SLP Therapies (In Progress)     Topic: Physical Therapy (In Progress)     Point: Mobility training (In Progress)     Learning Progress Summary           Patient Nonacceptance, E,TB, NR by MICKEY at 5/2/2022 1121                   Point: Home exercise program (In Progress)     Learning Progress Summary           Patient Nonacceptance, E,TB, NR by MICKEY at 5/2/2022 1121                   Point: Body mechanics (In Progress)     Learning Progress Summary           Patient Nonacceptance, E,TB, NR by MICKEY at 5/2/2022 1121                   Point: Precautions (In Progress)     Learning Progress Summary           Patient Nonacceptance, E,TB, NR by MICKEY at 5/2/2022 1121                               User  Key     Initials Effective Dates Name Provider Type Discipline     03/11/22 -  Mary Neal, PT Student PT Student PT              PT Recommendation and Plan     Plan of Care Reviewed With: (P) patient  Outcome Evaluation: (P) Pt is a 57 YO F admitted for acute metabolic encephalopathy and ICH. Pt A&O to name and responded but unable to assess any further. Pt able to follow commands <25% of the time. Pt presents with decreased strength, mobility, and transfers. Pt demonstrated bed mobility Max Ax2 with max cuing to sit EOB. Static sitting balance Max Ax2 to knee block B LEs secondary to pt scooting towards EOB. Third person assist to return pt to supine for safety. Pt would repeat phrases throughout session and become slightly agitated towards end of treatment. Pt returned to restraints for safety. PT will continue to follow to address strength, mobility, and transfers.     Time Calculation:    PT Charges     Row Name 05/02/22 1121             Time Calculation    Start Time 0919 (P)   -      Stop Time 0941 (P)   -      Time Calculation (min) 22 min (P)   -      PT Received On 05/02/22 (P)   -      PT - Next Appointment 05/03/22 (P)   -      PT Goal Re-Cert Due Date 05/09/22 (P)   -              Time Calculation- PT    Total Timed Code Minutes- PT 17 minute(s) (P)   -              Timed Charges    35222 - PT Therapeutic Activity Minutes 17 (P)   -              Untimed Charges    PT Eval/Re-eval Minutes 9 (P)   -KA              Total Minutes    Timed Charges Total Minutes 17 (P)   -      Untimed Charges Total Minutes 9 (P)   -KA       Total Minutes 26 (P)   -            User Key  (r) = Recorded By, (t) = Taken By, (c) = Cosigned By    Initials Name Provider Type    Mary Prajapati, PT Student PT Student              Therapy Charges for Today     Code Description Service Date Service Provider Modifiers Qty    50523917450 HC PT THERAPEUTIC ACT EA 15 MIN 5/2/2022 Mary Neal, PT Student GP 1     [id number]  PT THER SUPP EA 15 MIN 5/2/2022 Mary Neal, PT Student GP 1    73722623598 HC PT EVAL MOD COMPLEXITY 2 5/2/2022 Mary Neal, PT Student GP 1          PT G-Codes  Outcome Measure Options: (P) AM-PAC 6 Clicks Basic Mobility (PT)  AM-PAC 6 Clicks Score (PT): (P) 6    Mary Neal PT Student  5/2/2022

## 2022-05-02 NOTE — PLAN OF CARE
Goal Outcome Evaluation: Pt continues to be confused vs. Aphasic? Answers orientation questions appropriately, but does not converse well. VSS on RA. Cardene gtt stopped. Weaning precedex as tolerated, clonidine added. TF advanced to goal. SLP eval done, clear liquids started, but pt refused to try solid foods during eval. PT eval done.   Problem: Fall Injury Risk  Goal: Absence of Fall and Fall-Related Injury  Outcome: Ongoing, Not Progressing  Intervention: Identify and Manage Contributors  Recent Flowsheet Documentation  Taken 5/2/2022 1700 by Isa James RN  Medication Review/Management:   medications reviewed   high-risk medications identified  Taken 5/2/2022 1600 by Isa James RN  Medication Review/Management:   medications reviewed   high-risk medications identified  Taken 5/2/2022 1500 by Isa James RN  Medication Review/Management:   medications reviewed   high-risk medications identified  Taken 5/2/2022 1400 by Isa James RN  Medication Review/Management:   medications reviewed   high-risk medications identified  Taken 5/2/2022 1300 by Isa James RN  Medication Review/Management:   medications reviewed   high-risk medications identified  Taken 5/2/2022 1200 by Isa James RN  Medication Review/Management:   medications reviewed   high-risk medications identified  Taken 5/2/2022 1100 by Isa James RN  Medication Review/Management:   medications reviewed   high-risk medications identified  Taken 5/2/2022 1000 by Isa James RN  Medication Review/Management:   medications reviewed   high-risk medications identified  Taken 5/2/2022 0900 by Isa James RN  Medication Review/Management:   medications reviewed   high-risk medications identified  Taken 5/2/2022 0800 by Isa James RN  Medication Review/Management:   medications reviewed   high-risk medications identified  Intervention: Promote Injury-Free Environment  Recent Flowsheet  Documentation  Taken 5/2/2022 1700 by Isa James RN  Safety Promotion/Fall Prevention:   fall prevention program maintained   nonskid shoes/slippers when out of bed   room organization consistent   safety round/check completed  Taken 5/2/2022 1600 by Isa James RN  Safety Promotion/Fall Prevention:   fall prevention program maintained   nonskid shoes/slippers when out of bed   room organization consistent   safety round/check completed  Taken 5/2/2022 1500 by Isa James RN  Safety Promotion/Fall Prevention:   fall prevention program maintained   nonskid shoes/slippers when out of bed   room organization consistent   safety round/check completed  Taken 5/2/2022 1400 by Isa James RN  Safety Promotion/Fall Prevention:   fall prevention program maintained   nonskid shoes/slippers when out of bed   room organization consistent   safety round/check completed  Taken 5/2/2022 1300 by Isa James RN  Safety Promotion/Fall Prevention:   fall prevention program maintained   nonskid shoes/slippers when out of bed   room organization consistent   safety round/check completed  Taken 5/2/2022 1200 by Isa James RN  Safety Promotion/Fall Prevention:   activity supervised   fall prevention program maintained   nonskid shoes/slippers when out of bed   room organization consistent   safety round/check completed  Taken 5/2/2022 1100 by Isa James RN  Safety Promotion/Fall Prevention:   fall prevention program maintained   nonskid shoes/slippers when out of bed   safety round/check completed   room organization consistent  Taken 5/2/2022 1000 by Isa James RN  Safety Promotion/Fall Prevention:   fall prevention program maintained   nonskid shoes/slippers when out of bed   room organization consistent   safety round/check completed  Taken 5/2/2022 0900 by Isa James RN  Safety Promotion/Fall Prevention:   fall prevention program maintained   nonskid shoes/slippers when out of  bed   room organization consistent   safety round/check completed  Taken 5/2/2022 0800 by Isa James RN  Safety Promotion/Fall Prevention:   fall prevention program maintained   nonskid shoes/slippers when out of bed   room organization consistent   safety round/check completed     Problem: Skin Injury Risk Increased  Goal: Skin Health and Integrity  Outcome: Ongoing, Not Progressing  Intervention: Optimize Skin Protection  Recent Flowsheet Documentation  Taken 5/2/2022 1600 by Isa James RN  Pressure Reduction Techniques:   frequent weight shift encouraged   heels elevated off bed   weight shift assistance provided  Head of Bed (HOB) Positioning: HOB at 30-45 degrees  Pressure Reduction Devices:   heel offloading device utilized   positioning supports utilized   specialty bed utilized  Skin Protection:   adhesive use limited   incontinence pads utilized  Taken 5/2/2022 1400 by Isa James RN  Head of Bed (HOB) Positioning: HOB at 30-45 degrees  Taken 5/2/2022 1200 by Isa James RN  Pressure Reduction Techniques:   frequent weight shift encouraged   heels elevated off bed   weight shift assistance provided  Pressure Reduction Devices:   heel offloading device utilized   positioning supports utilized   specialty bed utilized  Skin Protection:   adhesive use limited   incontinence pads utilized  Taken 5/2/2022 1000 by Isa James RN  Head of Bed (HOB) Positioning: HOB at 20-30 degrees  Taken 5/2/2022 0800 by Isa James RN  Pressure Reduction Techniques:   frequent weight shift encouraged   heels elevated off bed   weight shift assistance provided  Head of Bed (HOB) Positioning: HOB at 30-45 degrees  Pressure Reduction Devices:   heel offloading device utilized   positioning supports utilized   specialty bed utilized  Skin Protection:   adhesive use limited   incontinence pads utilized     Problem: Adult Inpatient Plan of Care  Goal: Plan of Care Review  Outcome: Ongoing, Not  Progressing  Goal: Patient-Specific Goal (Individualized)  Outcome: Ongoing, Not Progressing  Goal: Absence of Hospital-Acquired Illness or Injury  Outcome: Ongoing, Not Progressing  Intervention: Identify and Manage Fall Risk  Recent Flowsheet Documentation  Taken 5/2/2022 1700 by Isa James RN  Safety Promotion/Fall Prevention:   fall prevention program maintained   nonskid shoes/slippers when out of bed   room organization consistent   safety round/check completed  Taken 5/2/2022 1600 by Isa James RN  Safety Promotion/Fall Prevention:   fall prevention program maintained   nonskid shoes/slippers when out of bed   room organization consistent   safety round/check completed  Taken 5/2/2022 1500 by Isa James RN  Safety Promotion/Fall Prevention:   fall prevention program maintained   nonskid shoes/slippers when out of bed   room organization consistent   safety round/check completed  Taken 5/2/2022 1400 by Isa James RN  Safety Promotion/Fall Prevention:   fall prevention program maintained   nonskid shoes/slippers when out of bed   room organization consistent   safety round/check completed  Taken 5/2/2022 1300 by Isa James RN  Safety Promotion/Fall Prevention:   fall prevention program maintained   nonskid shoes/slippers when out of bed   room organization consistent   safety round/check completed  Taken 5/2/2022 1200 by Isa James RN  Safety Promotion/Fall Prevention:   activity supervised   fall prevention program maintained   nonskid shoes/slippers when out of bed   room organization consistent   safety round/check completed  Taken 5/2/2022 1100 by Isa James RN  Safety Promotion/Fall Prevention:   fall prevention program maintained   nonskid shoes/slippers when out of bed   safety round/check completed   room organization consistent  Taken 5/2/2022 1000 by Isa James RN  Safety Promotion/Fall Prevention:   fall prevention program maintained   nonskid  shoes/slippers when out of bed   room organization consistent   safety round/check completed  Taken 5/2/2022 0900 by Isa James RN  Safety Promotion/Fall Prevention:   fall prevention program maintained   nonskid shoes/slippers when out of bed   room organization consistent   safety round/check completed  Taken 5/2/2022 0800 by Isa James RN  Safety Promotion/Fall Prevention:   fall prevention program maintained   nonskid shoes/slippers when out of bed   room organization consistent   safety round/check completed  Intervention: Prevent Skin Injury  Recent Flowsheet Documentation  Taken 5/2/2022 1600 by Isa James RN  Body Position:   position changed independently   supine  Skin Protection:   adhesive use limited   incontinence pads utilized  Taken 5/2/2022 1400 by Isa James RN  Body Position:   position changed independently   turned   left  Taken 5/2/2022 1200 by Isa James RN  Body Position: position changed independently  Skin Protection:   adhesive use limited   incontinence pads utilized  Taken 5/2/2022 1000 by Isa James RN  Body Position: position changed independently  Taken 5/2/2022 0800 by Isa James RN  Body Position:   turned   tilted   left  Skin Protection:   adhesive use limited   incontinence pads utilized  Intervention: Prevent and Manage VTE (Venous Thromboembolism) Risk  Recent Flowsheet Documentation  Taken 5/2/2022 1600 by Isa James RN  VTE Prevention/Management:   bilateral   sequential compression devices on  Taken 5/2/2022 1200 by Isa James RN  VTE Prevention/Management:   bilateral   sequential compression devices on  Taken 5/2/2022 0800 by Isa James RN  Activity Management: activity adjusted per tolerance  VTE Prevention/Management:   bilateral   sequential compression devices on  Intervention: Prevent Infection  Recent Flowsheet Documentation  Taken 5/2/2022 1700 by Isa James RN  Infection Prevention:    environmental surveillance performed   hand hygiene promoted   personal protective equipment utilized   rest/sleep promoted   single patient room provided   visitors restricted/screened  Taken 5/2/2022 1600 by Isa James RN  Infection Prevention:   environmental surveillance performed   hand hygiene promoted   personal protective equipment utilized   rest/sleep promoted   single patient room provided   visitors restricted/screened  Taken 5/2/2022 1500 by Isa James RN  Infection Prevention:   environmental surveillance performed   hand hygiene promoted   personal protective equipment utilized   rest/sleep promoted   single patient room provided   visitors restricted/screened  Taken 5/2/2022 1400 by Isa James RN  Infection Prevention:   environmental surveillance performed   hand hygiene promoted   personal protective equipment utilized   rest/sleep promoted   single patient room provided   visitors restricted/screened  Taken 5/2/2022 1300 by Isa James RN  Infection Prevention:   environmental surveillance performed   hand hygiene promoted   personal protective equipment utilized   rest/sleep promoted   single patient room provided   visitors restricted/screened  Taken 5/2/2022 1200 by Isa James RN  Infection Prevention:   environmental surveillance performed   hand hygiene promoted   personal protective equipment utilized   rest/sleep promoted   single patient room provided   visitors restricted/screened  Taken 5/2/2022 1100 by Isa James RN  Infection Prevention:   environmental surveillance performed   hand hygiene promoted   personal protective equipment utilized   rest/sleep promoted   single patient room provided   visitors restricted/screened  Taken 5/2/2022 1000 by Isa James RN  Infection Prevention:   environmental surveillance performed   hand hygiene promoted   personal protective equipment utilized   rest/sleep promoted   single patient room provided    visitors restricted/screened  Taken 5/2/2022 0900 by Isa James RN  Infection Prevention:   environmental surveillance performed   hand hygiene promoted   personal protective equipment utilized   rest/sleep promoted   single patient room provided   visitors restricted/screened  Taken 5/2/2022 0800 by Isa James RN  Infection Prevention:   environmental surveillance performed   hand hygiene promoted   personal protective equipment utilized   rest/sleep promoted   single patient room provided   visitors restricted/screened  Goal: Optimal Comfort and Wellbeing  Outcome: Ongoing, Not Progressing  Intervention: Monitor Pain and Promote Comfort  Recent Flowsheet Documentation  Taken 5/2/2022 1700 by Isa James RN  Pain Management Interventions:   unnecessary movement minimized   relaxation techniques promoted   quiet environment facilitated   position adjusted   pillow support provided  Taken 5/2/2022 1600 by Isa James RN  Pain Management Interventions:   unnecessary movement minimized   relaxation techniques promoted   quiet environment facilitated   position adjusted   pillow support provided  Taken 5/2/2022 1500 by Isa James RN  Pain Management Interventions:   unnecessary movement minimized   relaxation techniques promoted   quiet environment facilitated   position adjusted   pillow support provided  Taken 5/2/2022 1400 by Isa James RN  Pain Management Interventions:   unnecessary movement minimized   relaxation techniques promoted   quiet environment facilitated   position adjusted   pillow support provided  Taken 5/2/2022 1300 by Isa James RN  Pain Management Interventions:   unnecessary movement minimized   relaxation techniques promoted   quiet environment facilitated   position adjusted   pillow support provided  Taken 5/2/2022 1200 by Isa James RN  Pain Management Interventions:   unnecessary movement minimized   relaxation techniques promoted   quiet  environment facilitated   position adjusted   pillow support provided  Taken 5/2/2022 1100 by Isa James RN  Pain Management Interventions:   unnecessary movement minimized   relaxation techniques promoted   quiet environment facilitated   position adjusted   pillow support provided  Taken 5/2/2022 1000 by Isa James RN  Pain Management Interventions:   unnecessary movement minimized   relaxation techniques promoted   quiet environment facilitated   position adjusted   pillow support provided  Taken 5/2/2022 0900 by Isa James RN  Pain Management Interventions:   unnecessary movement minimized   relaxation techniques promoted   quiet environment facilitated   position adjusted   pillow support provided  Taken 5/2/2022 0800 by Isa James RN  Pain Management Interventions:   unnecessary movement minimized   relaxation techniques promoted   quiet environment facilitated   position adjusted   pillow support provided  Intervention: Provide Person-Centered Care  Recent Flowsheet Documentation  Taken 5/2/2022 1600 by Isa James RN  Trust Relationship/Rapport:   care explained   choices provided   emotional support provided   empathic listening provided   questions answered   questions encouraged   reassurance provided   thoughts/feelings acknowledged  Taken 5/2/2022 1200 by Isa James RN  Trust Relationship/Rapport:   care explained   choices provided   emotional support provided   empathic listening provided   questions answered   questions encouraged   reassurance provided   thoughts/feelings acknowledged  Taken 5/2/2022 0800 by Isa James RN  Trust Relationship/Rapport:   care explained   choices provided   emotional support provided   empathic listening provided   questions answered   questions encouraged   reassurance provided   thoughts/feelings acknowledged  Goal: Readiness for Transition of Care  Outcome: Ongoing, Not Progressing     Problem: Adjustment to Illness  (Chronic Kidney Disease)  Goal: Optimal Coping with Chronic Illness  Outcome: Ongoing, Not Progressing  Intervention: Support Psychosocial Response  Recent Flowsheet Documentation  Taken 5/2/2022 1600 by Isa James RN  Supportive Measures:   active listening utilized   decision-making supported   goal-setting facilitated   positive reinforcement provided   relaxation techniques promoted   self-care encouraged   verbalization of feelings encouraged  Taken 5/2/2022 1200 by Isa James RN  Supportive Measures:   active listening utilized   decision-making supported   goal-setting facilitated   positive reinforcement provided   relaxation techniques promoted   self-care encouraged   verbalization of feelings encouraged  Taken 5/2/2022 0800 by Isa James RN  Supportive Measures:   active listening utilized   decision-making supported   goal-setting facilitated   positive reinforcement provided   relaxation techniques promoted   self-care encouraged   verbalization of feelings encouraged     Problem: Electrolyte Imbalance (Chronic Kidney Disease)  Goal: Electrolyte Balance  Outcome: Ongoing, Not Progressing     Problem: Fluid Volume Excess (Chronic Kidney Disease)  Goal: Fluid Balance  Outcome: Ongoing, Not Progressing  Intervention: Monitor and Manage Hypervolemia  Recent Flowsheet Documentation  Taken 5/2/2022 1600 by Isa James RN  Skin Protection:   adhesive use limited   incontinence pads utilized  Taken 5/2/2022 1200 by Isa James RN  Skin Protection:   adhesive use limited   incontinence pads utilized  Taken 5/2/2022 0800 by Isa James RN  Skin Protection:   adhesive use limited   incontinence pads utilized     Problem: Functional Decline (Chronic Kidney Disease)  Goal: Optimal Functional Ability  Outcome: Ongoing, Not Progressing  Intervention: Optimize Functional Ability  Recent Flowsheet Documentation  Taken 5/2/2022 0800 by Isa James RN  Activity Management:  activity adjusted per tolerance     Problem: Hematologic Alteration (Chronic Kidney Disease)  Goal: Absence of Anemia Signs and Symptoms  Outcome: Ongoing, Not Progressing  Intervention: Manage Signs of Anemia and Bleeding  Recent Flowsheet Documentation  Taken 5/2/2022 1600 by Isa James RN  Environmental Support:   calm environment promoted   caregiver consistency promoted   distractions minimized   environmental consistency promoted   rest periods encouraged   rooming-in facilitated  Taken 5/2/2022 1200 by Isa James RN  Environmental Support:   calm environment promoted   caregiver consistency promoted   distractions minimized   environmental consistency promoted   rest periods encouraged   rooming-in facilitated  Taken 5/2/2022 0800 by Isa James RN  Environmental Support:   calm environment promoted   caregiver consistency promoted   distractions minimized   environmental consistency promoted   rest periods encouraged   rooming-in facilitated     Problem: Oral Intake Inadequate (Chronic Kidney Disease)  Goal: Optimal Oral Intake  Outcome: Ongoing, Not Progressing     Problem: Pain (Chronic Kidney Disease)  Goal: Acceptable Pain Control  Outcome: Ongoing, Not Progressing  Intervention: Prevent or Manage Pain  Recent Flowsheet Documentation  Taken 5/2/2022 1700 by Isa James RN  Pain Management Interventions:   unnecessary movement minimized   relaxation techniques promoted   quiet environment facilitated   position adjusted   pillow support provided  Taken 5/2/2022 1600 by Isa James RN  Pain Management Interventions:   unnecessary movement minimized   relaxation techniques promoted   quiet environment facilitated   position adjusted   pillow support provided  Sleep/Rest Enhancement:   awakenings minimized   consistent schedule promoted   regular sleep/rest pattern promoted   relaxation techniques promoted   room darkened   noise level reduced   natural light exposure  provided  Taken 5/2/2022 1500 by Isa James RN  Pain Management Interventions:   unnecessary movement minimized   relaxation techniques promoted   quiet environment facilitated   position adjusted   pillow support provided  Taken 5/2/2022 1400 by Isa James RN  Pain Management Interventions:   unnecessary movement minimized   relaxation techniques promoted   quiet environment facilitated   position adjusted   pillow support provided  Taken 5/2/2022 1300 by Isa James RN  Pain Management Interventions:   unnecessary movement minimized   relaxation techniques promoted   quiet environment facilitated   position adjusted   pillow support provided  Taken 5/2/2022 1200 by Isa James RN  Pain Management Interventions:   unnecessary movement minimized   relaxation techniques promoted   quiet environment facilitated   position adjusted   pillow support provided  Sleep/Rest Enhancement:   awakenings minimized   consistent schedule promoted   family presence promoted   natural light exposure provided   noise level reduced   regular sleep/rest pattern promoted   relaxation techniques promoted   room darkened  Taken 5/2/2022 1100 by Isa James RN  Pain Management Interventions:   unnecessary movement minimized   relaxation techniques promoted   quiet environment facilitated   position adjusted   pillow support provided  Taken 5/2/2022 1000 by Isa James RN  Pain Management Interventions:   unnecessary movement minimized   relaxation techniques promoted   quiet environment facilitated   position adjusted   pillow support provided  Taken 5/2/2022 0900 by Isa James RN  Pain Management Interventions:   unnecessary movement minimized   relaxation techniques promoted   quiet environment facilitated   position adjusted   pillow support provided  Taken 5/2/2022 0800 by Isa James RN  Pain Management Interventions:   unnecessary movement minimized   relaxation techniques promoted    quiet environment facilitated   position adjusted   pillow support provided  Sleep/Rest Enhancement:   awakenings minimized   consistent schedule promoted   natural light exposure provided   noise level reduced   regular sleep/rest pattern promoted   relaxation techniques promoted   room darkened     Problem: Renal Function Impairment (Chronic Kidney Disease)  Goal: Minimize Renal Failure Effects  Outcome: Ongoing, Not Progressing  Intervention: Protect and Monitor Dialysis Access Site  Recent Flowsheet Documentation  Taken 5/2/2022 1700 by Isa James RN  Safety Precautions: emergency equipment at bedside  Taken 5/2/2022 1600 by Isa James RN  Safety Precautions: emergency equipment at bedside  Taken 5/2/2022 1500 by Isa James RN  Safety Precautions: emergency equipment at bedside  Taken 5/2/2022 1400 by Isa James RN  Safety Precautions: emergency equipment at bedside  Taken 5/2/2022 1300 by Isa James RN  Safety Precautions: emergency equipment at bedside  Taken 5/2/2022 1200 by Isa James RN  Safety Precautions: emergency equipment at bedside  Taken 5/2/2022 1100 by Isa James RN  Safety Precautions: emergency equipment at bedside  Taken 5/2/2022 1000 by Isa James RN  Safety Precautions: emergency equipment at bedside  Taken 5/2/2022 0900 by Isa James RN  Safety Precautions: emergency equipment at bedside  Taken 5/2/2022 0800 by Isa James RN  Safety Precautions: emergency equipment at bedside  Intervention: Monitor and Support Renal Function  Recent Flowsheet Documentation  Taken 5/2/2022 1700 by Isa James RN  Medication Review/Management:   medications reviewed   high-risk medications identified  Taken 5/2/2022 1600 by Isa James RN  Medication Review/Management:   medications reviewed   high-risk medications identified  Taken 5/2/2022 1500 by Isa James RN  Medication Review/Management:   medications reviewed    high-risk medications identified  Taken 5/2/2022 1400 by Isa James RN  Medication Review/Management:   medications reviewed   high-risk medications identified  Taken 5/2/2022 1300 by Isa James RN  Medication Review/Management:   medications reviewed   high-risk medications identified  Taken 5/2/2022 1200 by Isa James RN  Medication Review/Management:   medications reviewed   high-risk medications identified  Taken 5/2/2022 1100 by Isa James RN  Medication Review/Management:   medications reviewed   high-risk medications identified  Taken 5/2/2022 1000 by Isa James RN  Medication Review/Management:   medications reviewed   high-risk medications identified  Taken 5/2/2022 0900 by Isa James RN  Medication Review/Management:   medications reviewed   high-risk medications identified  Taken 5/2/2022 0800 by Isa James RN  Medication Review/Management:   medications reviewed   high-risk medications identified     Problem: Confusion Acute  Goal: Optimal Cognitive Function  Outcome: Ongoing, Not Progressing     Problem: Electrolyte Imbalance  Goal: Electrolyte Balance  Outcome: Ongoing, Not Progressing     Problem: Restraint, Nonbehavioral (Nonviolent)  Goal: Absence of Harm or Injury  Outcome: Ongoing, Not Progressing  Intervention: Implement Least Restrictive Safety Strategies  Recent Flowsheet Documentation  Taken 5/2/2022 1700 by Isa James RN  Medical Device Protection:   IV pole/bag removed from visual field   torso covered   tubing secured  Taken 5/2/2022 1600 by Isa James RN  Medical Device Protection:   IV pole/bag removed from visual field   torso covered   tubing secured  Less Restrictive Alternative:   bed alarm in use   appropriate expression promoted   environment adjusted   sensory stimulation limited  Taken 5/2/2022 1500 by Isa James, RN  Medical Device Protection:   IV pole/bag removed from visual field   torso covered   tubing  secured  Taken 5/2/2022 1400 by Isa James RN  Medical Device Protection:   IV pole/bag removed from visual field   torso covered   tubing secured  Less Restrictive Alternative:   bed alarm in use   appropriate expression promoted   environment adjusted   sensory stimulation limited  Taken 5/2/2022 1300 by Isa James RN  Medical Device Protection:   IV pole/bag removed from visual field   torso covered   tubing secured  Taken 5/2/2022 1200 by Isa James RN  Medical Device Protection:   IV pole/bag removed from visual field   torso covered   tubing secured  Less Restrictive Alternative:   bed alarm in use   appropriate expression promoted   environment adjusted   sensory stimulation limited  Taken 5/2/2022 1100 by Isa James RN  Medical Device Protection:   IV pole/bag removed from visual field   torso covered   tubing secured  Taken 5/2/2022 1000 by Isa James RN  Medical Device Protection:   IV pole/bag removed from visual field   torso covered   tubing secured  Less Restrictive Alternative:   bed alarm in use   appropriate expression promoted   environment adjusted   sensory stimulation limited  Taken 5/2/2022 0919 by Isa James RN  Less Restrictive Alternative:   bed alarm in use   appropriate expression promoted   environment adjusted   sensory stimulation limited  Taken 5/2/2022 0900 by Isa James RN  Medical Device Protection:   IV pole/bag removed from visual field   torso covered   tubing secured  Taken 5/2/2022 0800 by Isa James RN  Medical Device Protection:   IV pole/bag removed from visual field   torso covered   tubing secured  Less Restrictive Alternative:   bed alarm in use   appropriate expression promoted   environment adjusted   sensory stimulation limited  Intervention: Protect Dignity, Rights, and Personal Wellbeing  Recent Flowsheet Documentation  Taken 5/2/2022 1600 by Isa Jmaes RN  Trust Relationship/Rapport:   care explained    choices provided   emotional support provided   empathic listening provided   questions answered   questions encouraged   reassurance provided   thoughts/feelings acknowledged  Taken 5/2/2022 1200 by Isa James RN  Trust Relationship/Rapport:   care explained   choices provided   emotional support provided   empathic listening provided   questions answered   questions encouraged   reassurance provided   thoughts/feelings acknowledged  Taken 5/2/2022 0800 by Isa James RN  Trust Relationship/Rapport:   care explained   choices provided   emotional support provided   empathic listening provided   questions answered   questions encouraged   reassurance provided   thoughts/feelings acknowledged  Intervention: Protect Skin and Joint Integrity  Recent Flowsheet Documentation  Taken 5/2/2022 1600 by Isa James RN  Body Position:   position changed independently   supine  Taken 5/2/2022 1400 by Isa James RN  Body Position:   position changed independently   turned   left  Taken 5/2/2022 1200 by Isa James RN  Body Position: position changed independently  Taken 5/2/2022 1000 by Isa James RN  Body Position: position changed independently  Taken 5/2/2022 0800 by Isa James RN  Body Position:   turned   tilted   left     Problem: Aspiration (Enteral Nutrition)  Goal: Absence of Aspiration Signs and Symptoms  Outcome: Ongoing, Not Progressing  Intervention: Minimize Aspiration Risk  Recent Flowsheet Documentation  Taken 5/2/2022 1600 by Isa James RN  Head of Bed ([facility]) Positioning: HOB at 30-45 degrees  Taken 5/2/2022 1400 by Isa James RN  Head of Bed ([facility]) Positioning: HOB at 30-45 degrees  Taken 5/2/2022 1200 by Isa James RN  Oral Care: patient refused intervention  Taken 5/2/2022 1000 by Isa James RN  Head of Bed ([facility]) Positioning: HOB at 20-30 degrees  Taken 5/2/2022 0800 by Isa James RN  Head of Bed ([facility]) Positioning: HOB at 30-45  degrees  Oral Care: swabbed with antiseptic solution     Problem: Device-Related Complication Risk (Enteral Nutrition)  Goal: Safe, Effective Therapy Delivery  Outcome: Ongoing, Not Progressing     Problem: Feeding Intolerance (Enteral Nutrition)  Goal: Feeding Tolerance  Outcome: Ongoing, Not Progressing   Plans for tunnel cath tomorrow in OR, therefore NPO after midnight. Small urine amounts voiding. HD planned for tomorrow after surgery. No acute s/s of distress, will continue to monitor.

## 2022-05-02 NOTE — PROGRESS NOTES
"DAILY PROGRESS NOTE  [facility]    Patient Identification:  Name: Zaina Martinez  Age: 56 y.o.  Sex: female  :  1965  MRN: [id number]         Primary Care Physician: Karson Oreilyl MD    Subjective:  Interval History: She is weak.  Confused and requiring restraints.    Objective:    Scheduled Meds:amLODIPine, 5 mg, Oral, Q24H  atorvastatin, 40 mg, Oral, Nightly  cloNIDine, 0.2 mg, Nasogastric, Q6H  heparin (porcine), 3,000 Units, Intracatheter, Once  insulin lispro, 0-7 Units, Subcutaneous, 4x Daily With Meals & Nightly  levothyroxine, 125 mcg, Oral, Q AM  OLANZapine, 5 mg, Oral, BID  pantoprazole, 40 mg, Intravenous, Q AM  rOPINIRole, 0.75 mg, Oral, Nightly  sertraline, 100 mg, Oral, Daily  sodium chloride, 10 mL, Intravenous, Q12H  tamsulosin, 0.4 mg, Oral, Daily  Vancomycin Pharmacy Intermittent Dosing, , Does not apply, Daily      Continuous Infusions:dexmedetomidine, 0.2-1.5 mcg/kg/hr, Last Rate: 0.9 mcg/kg/hr (22 1219)  niCARdipine, 5-15 mg/hr, Last Rate: 5 mg/hr (22 1314)  Pharmacy Consult,   Pharmacy to dose vancomycin,         Vital signs in last 24 hours:  Temp:  [97.5 °F (36.4 °C)-99.2 °F (37.3 °C)] 99.1 °F (37.3 °C)  Heart Rate:  [56-96] 75  BP: (119-143)/() 120/64    Intake/Output:    Intake/Output Summary (Last 24 hours) at 2022 1432  Last data filed at 2022 1200  Gross per 24 hour   Intake 1379.43 ml   Output --   Net 1379.43 ml       Exam:  /64   Pulse 75   Temp 99.1 °F (37.3 °C) (Oral)   Resp 20   Ht 157.5 cm (62.01\")   Wt 57.8 kg (127 lb 6.8 oz)   SpO2 94%   BMI 23.30 kg/m²     General Appearance:    Alert, cooperative, no distress   Head:    Normocephalic, without obvious abnormality, atraumatic   Eyes:       Throat:   Lips, tongue, gums normal   Neck:   Supple, symmetrical, trachea midline, no JVD   Lungs:     Clear to auscultation bilaterally, respirations unlabored   Chest Wall:    No tenderness or deformity    Heart:    Regular " rate and rhythm, S1 and S2 normal, no murmur,no  Rub or gallop   Abdomen:     Soft, nontender, bowel sounds active, no masses, no organomegaly    Extremities:   Extremities normal, atraumatic, no cyanosis or edema   Pulses:      Skin:   Skin is warm and dry,  no rashes or palpable lesions   Neurologic:  She is weak from stroke and not talking much.  confused      Lab Results (last 72 hours)     Procedure Component Value Units Date/Time    POC Glucose Once [[id number]]  (Normal) Collected: 04/30/22 1308    Specimen: Blood Updated: 04/30/22 1309     Glucose 80 mg/dL      Comment: Meter: [id number] : [id number] Chon Fernández RN       Blood Culture - Blood, Hand, Left [[id number]]  (Normal) Collected: 04/28/22 1113    Specimen: Blood from Hand, Left Updated: 04/30/22 1131     Blood Culture No growth at 2 days    Blood Culture - Blood, Arm, Right [[id number]]  (Normal) Collected: 04/28/22 1102    Specimen: Blood from Arm, Right Updated: 04/30/22 1130     Blood Culture No growth at 2 days    POC Glucose Once [[id number]]  (Normal) Collected: 04/30/22 0622    Specimen: Blood Updated: 04/30/22 0623     Glucose 87 mg/dL      Comment: Meter: [id number] : [id number] Emilie SAAVEDRA       Catheter Culture - Cath Tip, Neck [[id number]]  (Abnormal)  (Susceptibility) Collected: 04/28/22 1438    Specimen: Cath Tip from Neck Updated: 04/30/22 0619     CATHETER CULTURE >15 CFU/mL - Indicative of infection. Staphylococcus aureus, MRSA     Comment: Methicillin resistant Staphylococcus aureus, Patient may be an isolation risk.       Susceptibility      Staphylococcus aureus, MRSA      ARSENIO      Gentamicin Susceptible      Oxacillin Resistant     Rifampin Susceptible      Vancomycin Susceptible                    Linear View               Susceptibility Comments     Staphylococcus aureus, MRSA    This isolate does not demonstrate inducible clindamycin resistance in vitro.               Renal Function Panel [[id number]]  (Abnormal)  Collected: 04/30/22 0327    Specimen: Blood Updated: 04/30/22 0415     Glucose 94 mg/dL      BUN 36 mg/dL      Creatinine 4.89 mg/dL      Sodium 130 mmol/L      Potassium 4.9 mmol/L      Chloride 93 mmol/L      CO2 22.0 mmol/L      Calcium 8.0 mg/dL      Albumin 3.20 g/dL      Phosphorus 3.8 mg/dL      Anion Gap 15.0 mmol/L      BUN/Creatinine Ratio 7.4     eGFR 9.9 mL/min/1.73      Comment: <15 Indicative of kidney failure       Narrative:      GFR Normal >60  Chronic Kidney Disease <60  Kidney Failure <15      Vancomycin, Random [[id number]]  (Normal) Collected: 04/30/22 0327    Specimen: Blood Updated: 04/30/22 0415     Vancomycin Random 12.70 mcg/mL     Narrative:      Therapeutic Ranges for Vancomycin    Vancomycin Random   5.0-40.0 mcg/mL  Vancomycin Trough   5.0-20.0 mcg/mL  Vancomycin Peak     20.0-40.0 mcg/mL    Lipid Panel [[id number]] Collected: 04/30/22 0327    Specimen: Blood Updated: 04/30/22 0415     Total Cholesterol 145 mg/dL      Triglycerides 119 mg/dL      HDL Cholesterol 44 mg/dL      LDL Cholesterol  80 mg/dL      VLDL Cholesterol 21 mg/dL      LDL/HDL Ratio 1.75    Narrative:      Cholesterol Reference Ranges  (U.S. Department of Health and Human Services ATP III Classifications)    Desirable          <200 mg/dL  Borderline High    200-239 mg/dL  High Risk          >240 mg/dL      Triglyceride Reference Ranges  (U.S. Department of Health and Human Services ATP III Classifications)    Normal           <150 mg/dL  Borderline High  150-199 mg/dL  High             200-499 mg/dL  Very High        >500 mg/dL    HDL Reference Ranges  (U.S. Department of Health and Human Services ATP III Classifications)    Low     <40 mg/dl (major risk factor for CHD)  High    >60 mg/dl ('negative' risk factor for CHD)        LDL Reference Ranges  (U.S. Department of Health and Human Services ATP III Classifications)    Optimal          <100 mg/dL  Near Optimal     100-129 mg/dL  Borderline High  130-159 mg/dL  High              160-189 mg/dL  Very High        >189 mg/dL    Fibrinogen [[id number]]  (Abnormal) Collected: 04/30/22 0327    Specimen: Blood Updated: 04/30/22 0412     Fibrinogen 494 mg/dL     POC Glucose Once [[id number]]  (Abnormal) Collected: 04/30/22 0018    Specimen: Blood Updated: 04/30/22 0019     Glucose 133 mg/dL      Comment: Meter: [id number] : [id number] Emilie SAAVEDRA       Fibrinogen [[id number]]  (Normal) Collected: 04/29/22 2317    Specimen: Blood Updated: 04/29/22 2339     Fibrinogen 381 mg/dL     Protime-INR [[id number]]  (Normal) Collected: 04/29/22 2317    Specimen: Blood Updated: 04/29/22 2339     Protime 12.8 Seconds      INR 0.97    aPTT [[id number]]  (Normal) Collected: 04/29/22 2317    Specimen: Blood Updated: 04/29/22 2339     PTT 29.4 seconds     CBC & Differential [[id number]]  (Abnormal) Collected: 04/29/22 2317    Specimen: Blood Updated: 04/29/22 2325    Narrative:      The following orders were created for panel order CBC & Differential.  Procedure                               Abnormality         Status                     ---------                               -----------         ------                     CBC Auto Differential[[id number]]        Abnormal            Final result                 Please view results for these tests on the individual orders.    CBC Auto Differential [[id number]]  (Abnormal) Collected: 04/29/22 2317    Specimen: Blood Updated: 04/29/22 2325     WBC 17.03 10*3/mm3      RBC 3.88 10*6/mm3      Hemoglobin 11.0 g/dL      Hematocrit 31.9 %      MCV 82.2 fL      MCH 28.4 pg      MCHC 34.5 g/dL      RDW 18.4 %      RDW-SD 54.2 fl      MPV 10.4 fL      Platelets 150 10*3/mm3      Neutrophil % 86.7 %      Lymphocyte % 5.9 %      Monocyte % 6.3 %      Eosinophil % 0.2 %      Basophil % 0.3 %      Immature Grans % 0.6 %      Neutrophils, Absolute 14.78 10*3/mm3      Lymphocytes, Absolute 1.00 10*3/mm3      Monocytes, Absolute 1.07 10*3/mm3      Eosinophils, Absolute 0.03  10*3/mm3      Basophils, Absolute 0.05 10*3/mm3      Immature Grans, Absolute 0.10 10*3/mm3      nRBC 0.0 /100 WBC     POC Glucose Once [[id number]]  (Normal) Collected: 04/29/22 2108    Specimen: Blood Updated: 04/29/22 2109     Glucose 79 mg/dL      Comment: Meter: [id number] : [id number] Karon Cervantes RN       POC Glucose Once [[id number]]  (Normal) Collected: 04/29/22 1641    Specimen: Blood Updated: 04/29/22 1642     Glucose 72 mg/dL      Comment: Meter: [id number] : [id number] Tyron Ornelsa RN       POC Glucose Once [[id number]]  (Normal) Collected: 04/29/22 1525    Specimen: Blood Updated: 04/29/22 1527     Glucose 74 mg/dL      Comment: Meter: [id number] : [id number] Tiago Navarro CNA       POC Glucose Once [[id number]]  (Abnormal) Collected: 04/29/22 1109    Specimen: Blood Updated: 04/29/22 1110     Glucose 170 mg/dL      Comment: Meter: [id number] : [id number] Tiago Navarro CNA       Urine Culture - Urine, Urine, Catheter [[id number]]  (Abnormal) Collected: 04/27/22 1712    Specimen: Urine, Catheter Updated: 04/29/22 0946     Urine Culture <10,000 CFU/mL Staphylococcus aureus, MRSA     Comment: Call if further workup needed.    Methicillin resistant Staph aureus, patient may be an isolation risk.  Staphylococcus aureus is not typically regarded as a uropathogen, except in cases with genitourinary instrumentation present.  It's presence suggests an alternate source (e.g. bloodstream, abscess, SSTI, etc.).  Please evaluate accordingly.       POC Glucose Once [[id number]]  (Normal) Collected: 04/29/22 0607    Specimen: Blood Updated: 04/29/22 0609     Glucose 129 mg/dL      Comment: Meter: [id number] : [id number] Hakeem Santacruz RN       Renal Function Panel [[id number]]  (Abnormal) Collected: 04/29/22 0332    Specimen: Blood Updated: 04/29/22 0445     Glucose 179 mg/dL      BUN 32 mg/dL      Creatinine 4.12 mg/dL      Sodium 132 mmol/L      Potassium 4.3 mmol/L      Chloride 95 mmol/L      CO2 26.2  mmol/L      Calcium 8.4 mg/dL      Albumin 2.90 g/dL      Phosphorus 3.7 mg/dL      Anion Gap 10.8 mmol/L      BUN/Creatinine Ratio 7.8     eGFR 12.1 mL/min/1.73      Comment: <15 Indicative of kidney failure       Narrative:      GFR Normal >60  Chronic Kidney Disease <60  Kidney Failure <15      CBC & Differential [[id number]]  (Abnormal) Collected: 04/29/22 0332    Specimen: Blood Updated: 04/29/22 0353    Narrative:      The following orders were created for panel order CBC & Differential.  Procedure                               Abnormality         Status                     ---------                               -----------         ------                     CBC Auto Differential[[id number]]        Abnormal            Final result                 Please view results for these tests on the individual orders.    CBC Auto Differential [[id number]]  (Abnormal) Collected: 04/29/22 0332    Specimen: Blood Updated: 04/29/22 0353     WBC 14.34 10*3/mm3      RBC 3.83 10*6/mm3      Hemoglobin 10.5 g/dL      Hematocrit 31.9 %      MCV 83.3 fL      MCH 27.4 pg      MCHC 32.9 g/dL      RDW 18.5 %      RDW-SD 55.5 fl      MPV 10.1 fL      Platelets 128 10*3/mm3      Neutrophil % 78.2 %      Lymphocyte % 12.0 %      Monocyte % 8.9 %      Eosinophil % 0.3 %      Basophil % 0.2 %      Neutrophils, Absolute 11.22 10*3/mm3      Lymphocytes, Absolute 1.72 10*3/mm3      Monocytes, Absolute 1.27 10*3/mm3      Eosinophils, Absolute 0.04 10*3/mm3      Basophils, Absolute 0.03 10*3/mm3     POC Glucose Once [[id number]]  (Abnormal) Collected: 04/28/22 2020    Specimen: Blood Updated: 04/28/22 2022     Glucose 242 mg/dL      Comment: Meter: [id number] : [id number] Cedric MCNULTY       POC Glucose Once [[id number]]  (Abnormal) Collected: 04/28/22 1612    Specimen: Blood Updated: 04/28/22 1614     Glucose 135 mg/dL      Comment: Meter: [id number] : [id number] Domo SAAVEDRA       Procalcitonin [[id number]]  (Normal) Collected:  "04/28/22 0434    Specimen: Blood Updated: 04/28/22 1336     Procalcitonin 0.24 ng/mL     Narrative:      As a Marker for Sepsis (Non-Neonates):    1. <0.5 ng/mL represents a low risk of severe sepsis and/or septic shock.  2. >2 ng/mL represents a high risk of severe sepsis and/or septic shock.    As a Marker for Lower Respiratory Tract Infections that require antibiotic therapy:    PCT on Admission    Antibiotic Therapy       6-12 Hrs later    >0.5                Strongly Recommended  >0.25 - <0.5        Recommended   0.1 - 0.25          Discouraged              Remeasure/reassess PCT  <0.1                Strongly Discouraged     Remeasure/reassess PCT    As 28 day mortality risk marker: \"Change in Procalcitonin Result\" (>80% or <=80%) if Day 0 (or Day 1) and Day 4 values are available. Refer to [email or web address]    Change in PCT <=80%  A decrease of PCT levels below or equal to 80% defines a positive change in PCT test result representing a higher risk for 28-day all-cause mortality of patients diagnosed with severe sepsis for septic shock.    Change in PCT >80%  A decrease of PCT levels of more than 80% defines a negative change in PCT result representing a lower risk for 28-day all-cause mortality of patients diagnosed with severe sepsis or septic shock.       Hemoglobin A1c [[id number]]  (Abnormal) Collected: 04/28/22 1103    Specimen: Blood Updated: 04/28/22 1148     Hemoglobin A1C 5.80 %     Narrative:      Hemoglobin A1C Ranges:    Increased Risk for Diabetes  5.7% to 6.4%  Diabetes                     >= 6.5%  Diabetic Goal                < 7.0%    POC Glucose Once [[id number]]  (Normal) Collected: 04/28/22 1124    Specimen: Blood Updated: 04/28/22 1127     Glucose 105 mg/dL      Comment: Meter: [id number] : [id number] Domo SAAVEDRA       POC Glucose Once [[id number]]  (Normal) Collected: 04/28/22 0638    Specimen: Blood Updated: 04/28/22 0655     Glucose 113 mg/dL      Comment: Meter: " [id number] : [id number] Nabil SAAVEDRA       Renal Function Panel [[id number]]  (Abnormal) Collected: 04/28/22 0434    Specimen: Blood Updated: 04/28/22 0549     Glucose 106 mg/dL      BUN 21 mg/dL      Creatinine 3.06 mg/dL      Sodium 135 mmol/L      Potassium 4.6 mmol/L      Chloride 97 mmol/L      CO2 24.5 mmol/L      Calcium 9.0 mg/dL      Albumin 3.40 g/dL      Phosphorus 2.6 mg/dL      Anion Gap 13.5 mmol/L      BUN/Creatinine Ratio 6.9     eGFR 17.3 mL/min/1.73      Comment: National Kidney Foundation and American Society of Nephrology (ASN) Task Force recommended calculation based on the Chronic Kidney Disease Epidemiology Collaboration (CKD-EPI) equation refit without adjustment for race.       Narrative:      GFR Normal >60  Chronic Kidney Disease <60  Kidney Failure <15      Magnesium [[id number]]  (Normal) Collected: 04/28/22 0434    Specimen: Blood Updated: 04/28/22 0535     Magnesium 1.7 mg/dL     CBC & Differential [[id number]]  (Abnormal) Collected: 04/28/22 0434    Specimen: Blood Updated: 04/28/22 0520    Narrative:      The following orders were created for panel order CBC & Differential.  Procedure                               Abnormality         Status                     ---------                               -----------         ------                     CBC Auto Differential[[id number]]        Abnormal            Final result                 Please view results for these tests on the individual orders.    CBC Auto Differential [[id number]]  (Abnormal) Collected: 04/28/22 0434    Specimen: Blood Updated: 04/28/22 0520     WBC 13.49 10*3/mm3      RBC 4.13 10*6/mm3      Hemoglobin 11.3 g/dL      Hematocrit 33.3 %      MCV 80.6 fL      MCH 27.4 pg      MCHC 33.9 g/dL      RDW 18.4 %      RDW-SD 53.4 fl      MPV 10.4 fL      Platelets 137 10*3/mm3      Neutrophil % 82.8 %      Lymphocyte % 8.6 %      Monocyte % 7.6 %      Eosinophil % 0.1 %      Basophil % 0.5 %      Neutrophils, Absolute  11.17 10*3/mm3      Lymphocytes, Absolute 1.16 10*3/mm3      Monocytes, Absolute 1.03 10*3/mm3      Eosinophils, Absolute 0.01 10*3/mm3      Basophils, Absolute 0.07 10*3/mm3     Urinalysis, Microscopic Only - Urine, Catheter [[id number]]  (Abnormal) Collected: 04/27/22 1712    Specimen: Urine, Catheter Updated: 04/27/22 2301     RBC, UA 6-12 /HPF      WBC, UA 31-50 /HPF      Bacteria, UA Trace /HPF      Squamous Epithelial Cells, UA 0-2 /HPF      Hyaline Casts, UA 3-6 /LPF      Methodology Manual Light Microscopy    Urinalysis With Culture If Indicated - Urine, Catheter [[id number]]  (Abnormal) Collected: 04/27/22 1712    Specimen: Urine, Catheter Updated: 04/27/22 2247     Color, UA Yellow     Appearance, UA Clear     pH, UA 8.0     Specific Gravity, UA 1.020     Glucose,  mg/dL (1+)     Ketones, UA Negative     Bilirubin, UA Negative     Blood, UA Small (1+)     Protein, UA >=300 mg/dL (3+)     Leuk Esterase, UA Small (1+)     Nitrite, UA Negative     Urobilinogen, UA 0.2 E.U./dL    COVID PRE-OP / PRE-PROCEDURE SCREENING ORDER (NO ISOLATION) - Swab, Nasopharynx [[id number]]  (Normal) Collected: 04/27/22 1711    Specimen: Swab from Nasopharynx Updated: 04/27/22 1810    Narrative:      The following orders were created for panel order COVID PRE-OP / PRE-PROCEDURE SCREENING ORDER (NO ISOLATION) - Swab, Nasopharynx.  Procedure                               Abnormality         Status                     ---------                               -----------         ------                     COVID-19,[facility] IN-HOUSE...[[id number]]  Normal              Final result                 Please view results for these tests on the individual orders.    COVID-19,[facility] IN-HOUSE CEPHEID/MARTY NP SWAB IN TRANSPORT MEDIA 8-12 HR TAT - Swab, Nasopharynx [[id number]]  (Normal) Collected: 04/27/22 1711    Specimen: Swab from Nasopharynx Updated: 04/27/22 1810     COVID19 Not Detected    Narrative:      Fact sheet for providers:  https://www.fda.gov/media/969319/download     Fact sheet for patients: https://www.fda.gov/media/883143/download    Urine Drug Screen - Urine, Clean Catch [[id number]]  (Abnormal) Collected: 04/27/22 1712    Specimen: Urine, Clean Catch Updated: 04/27/22 1804     Amphet/Methamphet, Screen Negative     Barbiturates Screen, Urine Negative     Benzodiazepine Screen, Urine Positive     Cocaine Screen, Urine Negative     Opiate Screen Negative     THC, Screen, Urine Negative     Methadone Screen, Urine Negative     Oxycodone Screen, Urine Positive    Narrative:      Negative Thresholds Per Drugs Screened:    Amphetamines                 500 ng/ml  Barbiturates                 200 ng/ml  Benzodiazepines              100 ng/ml  Cocaine                      300 ng/ml  Methadone                    300 ng/ml  Opiates                      300 ng/ml  Oxycodone                    100 ng/ml  THC                           50 ng/ml    The Normal Value for all drugs tested is negative. This report includes final unconfirmed screening results to be used for medical treatment purposes only. Unconfirmed results must not be used for non-medical purposes such as employment or legal testing. Clinical consideration should be applied to any drug of abuse test, particularly when unconfirmed results are used.            Comprehensive Metabolic Panel [[id number]]  (Abnormal) Collected: 04/27/22 1615    Specimen: Blood Updated: 04/27/22 1722     Glucose 178 mg/dL      BUN 57 mg/dL      Creatinine 5.37 mg/dL      Sodium 132 mmol/L      Potassium 6.7 mmol/L      Chloride 94 mmol/L      CO2 24.1 mmol/L      Calcium 9.0 mg/dL      Total Protein 6.9 g/dL      Albumin 3.80 g/dL      ALT (SGPT) 14 U/L      AST (SGOT) 30 U/L      Alkaline Phosphatase 140 U/L      Total Bilirubin 0.9 mg/dL      Globulin 3.1 gm/dL      A/G Ratio 1.2 g/dL      BUN/Creatinine Ratio 10.6     Anion Gap 13.9 mmol/L      eGFR 8.8 mL/min/1.73      Comment: <15 Indicative of  kidney failure       Narrative:      GFR Normal >60  Chronic Kidney Disease <60  Kidney Failure <15      Blood Gas, Arterial - [[id number]]  (Abnormal) Collected: 04/27/22 1648    Specimen: Arterial Blood Updated: 04/27/22 1659     Site Arterial: right radial     Omar's Test Positive     pH, Arterial 7.395 pH units      pCO2, Arterial 44.0 mm Hg      pO2, Arterial 52.8 mm Hg      Comment: Meter: [id number] : [id number] Roshan NicoleCritical:Notify Dr dr madan farooq (27-Apr-22 16:53:55)Read back ok        HCO3, Arterial 27.0 mmol/L      Base Excess, Arterial 1.7 mmol/L      O2 Saturation Calculated 86.6 %      A-a Gradiant 0.5 mmHg      Barometric Pressure for Blood Gas 759.5 mmHg      Modality Room Air     FIO2 21 %      Rate 18 Breaths/minute     Ethanol [[id number]] Collected: 04/27/22 1615    Specimen: Blood Updated: 04/27/22 1657     Ethanol <10 mg/dL      Ethanol % <0.010 %         Data Review:  Results from last 7 days   Lab Units 05/02/22  0329 05/01/22  1050 04/30/22  0327   SODIUM mmol/L 130* 132* 130*   POTASSIUM mmol/L 3.9 4.6 4.9   CHLORIDE mmol/L 95* 96* 93*   CO2 mmol/L 22.3 21.3* 22.0   BUN mg/dL 26* 41* 36*   CREATININE mg/dL 4.23* 6.22* 4.89*   GLUCOSE mg/dL 119* 99 94   CALCIUM mg/dL 7.9* 8.0* 8.0*     Results from last 7 days   Lab Units 05/02/22  0329 05/01/22  1050 04/29/22  2317   WBC 10*3/mm3 10.55 12.18* 17.03*   HEMOGLOBIN g/dL 10.8* 10.1* 11.0*   HEMATOCRIT % 32.3* 29.6* 31.9*   PLATELETS 10*3/mm3 203 172 150         Results from last 7 days   Lab Units 04/28/22  1103   HEMOGLOBIN A1C % 5.80*     Lab Results   Lab Value Date/Time    TROPONINT 0.092 (C) 12/19/2021 1314    TROPONINT 0.117 (C) 11/30/2021 0153    TROPONINT 0.132 (C) 11/29/2021 2121     Results from last 7 days   Lab Units 04/30/22  0327   CHOLESTEROL mg/dL 145   TRIGLYCERIDES mg/dL 119   HDL CHOL mg/dL 44   LDL CHOL mg/dL 80     Results from last 7 days   Lab Units 04/27/22  1615   ALK PHOS U/L 140*   BILIRUBIN mg/dL 0.9    ALT (SGPT) U/L 14   AST (SGOT) U/L 30         Results from last 7 days   Lab Units 04/28/22  1103   HEMOGLOBIN A1C % 5.80*     Glucose   Date/Time Value Ref Range Status   05/02/2022 1144 216 (H) 70 - 130 mg/dL Final     Comment:     Meter: [id number] : [id number] Jacob Moss RN   05/02/2022 0753 223 (H) 70 - 130 mg/dL Final     Comment:     Meter: [id number] : [id number] Jacob Moss RN   05/02/2022 0616 172 (H) 70 - 130 mg/dL Final     Comment:     Meter: [id number] : [id number] Karon Cervantes RN   05/02/2022 0010 172 (H) 70 - 130 mg/dL Final     Comment:     Meter: [id number] : [id number] Karon Cervantes RN   05/01/2022 1742 123 70 - 130 mg/dL Final     Comment:     Meter: [id number] : [id number] Tiffany SAAVEDRA   05/01/2022 1202 108 70 - 130 mg/dL Final     Comment:     Treated Patient Confirmed by Repeat Meter: [id number] : [id number] Tiffany Gipson   05/01/2022 0724 178 (H) 70 - 130 mg/dL Final     Comment:     Meter: [id number] : [id number] Karon Cervantes RN   05/01/2022 0658 62 (L) 70 - 130 mg/dL Final     Comment:     Meter: [id number] : [id number] Karon Cervantes RN     Results from last 7 days   Lab Units 04/29/22  2317 04/27/22  1615   INR  0.97 1.02       Past Medical History:   Diagnosis Date   • Acute CVA (cerebrovascular accident) (HCC) 5/1/2022   • Acute on chronic diastolic CHF (congestive heart failure) ([facility])    • CAD (coronary artery disease) 12/20/2021   • Diabetes ([facility])    • Disease of thyroid gland    • GERD (gastroesophageal reflux disease)    • Hyperlipidemia 11/30/2018   • Hypertension    • Rheumatoid arthritis (HCC)        Assessment:  Active Hospital Problems    Diagnosis  POA   • **Encephalopathy, toxic [G92.9]  Unknown   • Acute CVA (cerebrovascular accident) (HCC) [I63.9]  Unknown   • Intracranial hemorrhage (HCC) [I62.9]  Unknown   • Acute metabolic encephalopathy [G93.41]  Yes   • Leukocytosis [D72.829]  Yes   • Acute on chronic diastolic CHF  (congestive heart failure) (HCC) [I50.33]  Yes   • CAD (coronary artery disease) [I25.10]  Yes   • ESRD (end stage renal disease) (HCC) [N18.6]  Yes   • Hypertensive disorder [I10]  Yes   • Acute DM type 2 causing complication (HCC) [E11.8]  Yes      Resolved Hospital Problems   No resolved problems to display.       Plan:  Continue with current medication.  ICU support.  As per multiple consultants.  Follow-up lab. HD per renal.    Aris Isbell MD  5/2/2022  14:32 EDT

## 2022-05-02 NOTE — PLAN OF CARE
Goal Outcome Evaluation:  Plan of Care Reviewed With: (P) patient           Outcome Evaluation: (P) Pt is a 55 YO F admitted for acute metabolic encephalopathy and ICH. Pt A&O to name and responded but unable to assess any further. Pt able to follow commands <25% of the time. Pt presents with decreased strength, mobility, and transfers. Pt demonstrated bed mobility Max Ax2 with max cuing to sit EOB. Static sitting balance Max Ax2 to knee block B LEs secondary to pt scooting towards EOB. Third person assist to return pt to supine for safety. Pt would repeat phrases throughout session and become slightly agitated towards end of treatment. Pt returned to restraints for safety. PT will continue to follow to address strength, mobility, and transfers.

## 2022-05-02 NOTE — PROGRESS NOTES
"[facility] Clinical Pharmacy Services: Clonidine Taper for Weaning Dexmedetomidine    Pharmacy has been consulted to start oral/enteral clonidine on Zaina Martinez to assist in weaning off dexmedetomidine per Dr. Randall's request. Dexmedetomidine withdrawal symptoms include tachycardia and increased agitation, so weaning will be done slowly with the goal of avoiding any withdrawal symptoms.     Relevant clinical data and objective history reviewed:  56 y.o. female 157.5 cm (62.01\") 57.8 kg (127 lb 6.8 oz)    Creatinine   Date Value Ref Range Status   05/02/2022 4.23 (H) 0.57 - 1.00 mg/dL Final   05/01/2022 6.22 (H) 0.57 - 1.00 mg/dL Final   04/30/2022 4.89 (H) 0.57 - 1.00 mg/dL Final     BUN   Date Value Ref Range Status   05/02/2022 26 (H) 6 - 20 mg/dL Final     Estimated Creatinine Clearance: 13.6 mL/min (A) (by C-G formula based on SCr of 4.23 mg/dL (H)).    Assessment    Dexmedetomidine 0 infusing at 0.8 mcg/kg/hr    Rass: RASS (Avila Agitation-Sedation Scale): 0-->alert and calm  CPOT: CPOT Score: 0      Plan    Start clonidine 0.2 mg NG q6 on. Hold for  MAP <65.  RN to decrease dexmedetomidine dose by 25% 3-4 hours after clonidine initiation.  Pharmacy will assess patient every 24 hrs for hypotension, signs of agitation, uncontrolled pain, toleration of clonidine (skipped or held doses), and PRN medications used to treat agitation.    Thank you for allowing me to participate in your patient's care. Please call pharmacy with any questions or concerns.    [facility]  Clinical Pharmacist    "

## 2022-05-02 NOTE — PROGRESS NOTES
LOS: 5 days     Chief Complaint: Sepsis    Interval History: Patient remains encephalopathic this morning.  Further review of systems unable to be obtained.  Fever curve overall improved.  Remains on room air.  Leukocytosis resolved.  Status post replacement of her hemodialysis catheter and blood cultures have remained negative at 3 days.    Vital Signs  Temp:  [97.5 °F (36.4 °C)-99.2 °F (37.3 °C)] 99.1 °F (37.3 °C)  Heart Rate:  [56-96] 96  BP: (119-142)/() 141/82    Physical Exam:  General: In no acute distress  Cardiovascular: RRR, no LE edema   Respiratory: Clear to ascultation bilaterally  GI: Soft, NT/ND, + bowel sounds bilaterally  Skin: No rashes.  Prior hemodialysis catheter without erythema.    Antibiotics:  Vancomycin dosing per pharmacy     Results Review:    Lab Results   Component Value Date    WBC 10.55 05/02/2022    HGB 10.8 (L) 05/02/2022    HCT 32.3 (L) 05/02/2022    MCV 82.4 05/02/2022     05/02/2022     Lab Results   Component Value Date    GLUCOSE 119 (H) 05/02/2022    BUN 26 (H) 05/02/2022    CREATININE 4.23 (H) 05/02/2022    EGFRIFNONA 14 (L) 02/28/2022    EGFRIFAFRI  01/13/2022      Comment:      <15 Indicative of kidney failure.    BCR 6.1 (L) 05/02/2022    CO2 22.3 05/02/2022    CALCIUM 7.9 (L) 05/02/2022    ALBUMIN 3.00 (L) 05/02/2022    AST 30 04/27/2022    ALT 14 04/27/2022       Microbiology:  4/27 urine culture <10K MRSA  4/28 blood cultures NGTD x 2  4/28 catheter tip culture from hemodialysis catheter with MRSA    CT of the head personally reviewed by me shows increasing size of the frontal intraparenchymal hematoma compared to yesterday.    Assessment/Plan   #Sepsis  #Hemodialysis catheter related infection with MRSA now removed  #Intraparenchymal hematoma  #Acute encephalopathy  #Type 2 diabetes  #ESRD on hemodialysis    Continue intermittent dosed IV vancomycin goal -600 for MRSA infection associated with her hemodialysis catheter. Blood cultures have remained  negative and TTE was without any vegetation. Given her complicated presentation with stroke I think a ANJEL would be reasonable.     ID will follow.

## 2022-05-02 NOTE — PROGRESS NOTES
[facility] Progress Note      Patient Name: Zaina Martinez  : 1965  MRN: [id number]  Primary Care Physician:  Karson Oreilly MD  Date of admission: 2022    Subjective     Interval History:   Follow-up end-stage renal disease  EEG in progress  Right IJ Shiley catheter placed   Tube feeds infusing  Cardene drip infusing    Review of Systems:   As noted above    Objective     Vitals:   Temp:  [97.5 °F (36.4 °C)-99.2 °F (37.3 °C)] 99.1 °F (37.3 °C)  Heart Rate:  [56-96] 75  BP: (119-143)/() 120/64    Intake/Output Summary (Last 24 hours) at 2022 1505  Last data filed at 2022 1200  Gross per 24 hour   Intake 1379.43 ml   Output --   Net 1379.43 ml       Physical Exam:    General: awake, agitated, speaks at times but mostly nonsensical; chewing on EEG lead (removed from mouth)  Skin: warm and dry  HEENT: oral mucosa normal, nonicteric sclera  Neck: supple, no JVD, right IJ Shiley (placed )  Lungs: Coarse BS, unlabored on room air  Heart: RRR, normal S1 and S2, no rub  Abdomen: soft, nontender, nondistended, normoactive bowel  : no palpable bladder  Extremities: no edema, cyanosis or clubbing  Neuro: Moves all extremities    Scheduled Meds:     amLODIPine, 5 mg, Oral, Q24H  atorvastatin, 40 mg, Oral, Nightly  cloNIDine, 0.2 mg, Nasogastric, Q6H  heparin (porcine), 3,000 Units, Intracatheter, Once  insulin lispro, 0-7 Units, Subcutaneous, 4x Daily With Meals & Nightly  levothyroxine, 125 mcg, Oral, Q AM  OLANZapine, 5 mg, Oral, BID  pantoprazole, 40 mg, Intravenous, Q AM  rOPINIRole, 0.75 mg, Oral, Nightly  sertraline, 100 mg, Oral, Daily  sodium chloride, 10 mL, Intravenous, Q12H  tamsulosin, 0.4 mg, Oral, Daily  Vancomycin Pharmacy Intermittent Dosing, , Does not apply, Daily      IV Meds:   dexmedetomidine, 0.2-1.5 mcg/kg/hr, Last Rate: 0.9 mcg/kg/hr (22 1219)  niCARdipine, 5-15 mg/hr, Last Rate: 5 mg/hr (22 1314)  Pharmacy Consult,   Pharmacy  to dose vancomycin,         Results Reviewed:   I have personally reviewed the results from the time of this admission to 5/2/2022 15:05 EDT     Results from last 7 days   Lab Units 05/02/22 0329 05/01/22 1050 04/30/22 0327 04/28/22  0434 04/27/22  1615   SODIUM mmol/L 130* 132* 130*   < > 132*   POTASSIUM mmol/L 3.9 4.6 4.9   < > 6.7*   CHLORIDE mmol/L 95* 96* 93*   < > 94*   CO2 mmol/L 22.3 21.3* 22.0   < > 24.1   BUN mg/dL 26* 41* 36*   < > 57*   CREATININE mg/dL 4.23* 6.22* 4.89*   < > 5.37*   CALCIUM mg/dL 7.9* 8.0* 8.0*   < > 9.0   BILIRUBIN mg/dL  --   --   --   --  0.9   ALK PHOS U/L  --   --   --   --  140*   ALT (SGPT) U/L  --   --   --   --  14   AST (SGOT) U/L  --   --   --   --  30   GLUCOSE mg/dL 119* 99 94   < > 178*    < > = values in this interval not displayed.       Estimated Creatinine Clearance: 13.6 mL/min (A) (by C-G formula based on SCr of 4.23 mg/dL (H)).    Results from last 7 days   Lab Units 05/02/22 0329 04/30/22 0327 04/29/22  0332 04/28/22  0434   MAGNESIUM mg/dL  --   --   --  1.7   PHOSPHORUS mg/dL 3.2 3.8 3.7 2.6             Results from last 7 days   Lab Units 05/02/22  0329 05/01/22  1050 04/29/22 2317 04/29/22  0332 04/28/22  0434   WBC 10*3/mm3 10.55 12.18* 17.03* 14.34* 13.49*   HEMOGLOBIN g/dL 10.8* 10.1* 11.0* 10.5* 11.3*   PLATELETS 10*3/mm3 203 172 150 128* 137*       Results from last 7 days   Lab Units 04/29/22 2317 04/27/22  1615   INR  0.97 1.02       Assessment / Plan     ASSESSMENT:  1.    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  Volume excess by imaging 5/1; lungs clear by exam today.  Calcium stable; hyponatremia.  Last HD was yesterday  2.  Intracerebral bleed and altered mental status  3.  Purulent drainage from TDC, s/p removal 5/1  4.  Diabetes mellitus type 2 with renal complication  5.  Coronary artery disease  6.  Acute on chronic diastolic dysfunction   7.  Anemia of chronic kidney disease, on  long-acting ANDRAE as an outpatient  8.  Hypertension, labile exacerbated by volume excess      PLAN:  1.  HD tomorrow with additional fluid removal  2.  Increase amlodipine to 10 mg daily, and resume hydralazine (was taking at home), in effort to wean Cardene drip  3.  Surveillance labs     Thank you for involving us in the care of Zaina Martinez.  Please feel free to call with any questions.    Alejo Lange MD  05/02/22  15:05 EDT    [facility]  [phone]      Much of this encounter note is an electronic transcription/translation of spoken language to printed text. The electronic translation of spoken language may permit erroneous, or at times, nonsensical words or phrases to be inadvertently transcribed; Although I have reviewed the note for such errors, some may still exist.

## 2022-05-02 NOTE — PROGRESS NOTES
"DOS: 2022  NAME: Zaina Martinez   : 1965  PCP: Karson Oreilly MD  Chief Complaint   Patient presents with   • Altered Mental Status     PT WITH AMS, LAST SEEN NORMAL APPROX. 0800 TODAY; PT NOT TALKING, LEFT  WEAKNESS; PT IS ON DIALYSIS BUT HAS MISSED HER LAST APPOINTMENT; PT WEARING FACE MASK       Chief complaint: Stroke  Subjective: Patient continues to be encephalopathic, and not following commands well.  Has been agitated, and requiring restraints.    Objective:  Vital signs: /67   Pulse 87   Temp 99.1 °F (37.3 °C) (Oral)   Resp 20   Ht 157.5 cm (62.01\")   Wt 57.8 kg (127 lb 6.8 oz)   SpO2 94%   BMI 23.30 kg/m²    Gen: NAD, vitals reviewed  MS: Awake, inattentive, language intact, follows commands intermittently, no neglect  CN: Blinks to threat bilaterally, pupils 3 mm, reactive, conjugate gaze, no facial asymmetry  Motor: Moving all 4 extremities, normal tone throughout, in restraints  Sensory: Withdraws to painful stimuli bilaterally  Reflexes: Okay, no obvious hyperreflexia  Difficult to assess coordination and gait    ROS:  Unable to obtain due to encephalopathy      Laboratory results:  Lab Results   Component Value Date    GLUCOSE 119 (H) 2022    CALCIUM 7.9 (L) 2022     (L) 2022    K 3.9 2022    CO2 22.3 2022    CL 95 (L) 2022    BUN 26 (H) 2022    CREATININE 4.23 (H) 2022    EGFRIFAFRI  2022      Comment:      <15 Indicative of kidney failure.    EGFRIFNONA 14 (L) 2022    BCR 6.1 (L) 2022    ANIONGAP 12.7 2022     Lab Results   Component Value Date    WBC 10.55 2022    HGB 10.8 (L) 2022    HCT 32.3 (L) 2022    MCV 82.4 2022     2022     Lab Results   Component Value Date    LDL 80 2022         Lab 22  1103   HEMOGLOBIN A1C 5.80*        Review of labs: Morning labs pending    Review and interpretation of imaging: I personally reviewed her imaging and " labs while in the hospital.  Her initial MRI was negative for any acute findings, whereas the follow-up MRI did show left frontal ICH.  Her initial MRI did show left frontal FLAIR changes, but diffusion was negative.  Her CT head has been stable with left frontal ICH.  Her BUN and/creatinine is 26/4.23, and sodium of 130.    2D echo shows no evidence of vegetation    Diagnoses:  Spontaneous left subcortical intracerebral hemorrhage  Received TNK  End-stage renal disease on hemodialysis  Encephalopathy, metabolic.  Infected dialysis catheter    Comment: Her left frontal ICH is atypical, as her diffusion was negative in the initial MRI, although it did have some flair changes, which seems to have bled and caused her to have ICH.  Patient continues to be encephalopathic, and given her mental status changes, I would recommend getting an EEG.    Plan:  1.  Okay for continue HD from a neurology standpoint  2.  Continue to avoid aspirin/anticoagulation for now  3.  Will get EEG today.  4.  Patient is on Precedex and Cardene drip, for which she is in ICU.  5.  I would recommend getting repeat MRI brain with and without contrast in about 3 months.  6.  Increase her activity with PT/OT.  Try to wean off her Precedex.  7.  Continue supportive care.    Case was discussed with patient's nurse, and will talk to the primary team.  Thank you for the consult.

## 2022-05-03 ENCOUNTER — ANESTHESIA (OUTPATIENT)
Dept: PERIOP | Facility: HOSPITAL | Age: 57
End: 2022-05-03

## 2022-05-03 ENCOUNTER — ANESTHESIA EVENT (OUTPATIENT)
Dept: PERIOP | Facility: HOSPITAL | Age: 57
End: 2022-05-03

## 2022-05-03 ENCOUNTER — APPOINTMENT (OUTPATIENT)
Dept: GENERAL RADIOLOGY | Facility: HOSPITAL | Age: 57
End: 2022-05-03

## 2022-05-03 LAB
ALBUMIN SERPL-MCNC: 3 G/DL (ref 3.5–5.2)
ANION GAP SERPL CALCULATED.3IONS-SCNC: 12 MMOL/L (ref 5–15)
BACTERIA SPEC AEROBE CULT: NORMAL
BACTERIA SPEC AEROBE CULT: NORMAL
BUN SERPL-MCNC: 39 MG/DL (ref 6–20)
BUN/CREAT SERPL: 7.7 (ref 7–25)
CALCIUM SPEC-SCNC: 7.9 MG/DL (ref 8.6–10.5)
CHLORIDE SERPL-SCNC: 96 MMOL/L (ref 98–107)
CO2 SERPL-SCNC: 21 MMOL/L (ref 22–29)
CREAT SERPL-MCNC: 5.04 MG/DL (ref 0.57–1)
DEPRECATED RDW RBC AUTO: 55.2 FL (ref 37–54)
EGFRCR SERPLBLD CKD-EPI 2021: 9.5 ML/MIN/1.73
ERYTHROCYTE [DISTWIDTH] IN BLOOD BY AUTOMATED COUNT: 18 % (ref 12.3–15.4)
GLUCOSE BLDC GLUCOMTR-MCNC: 186 MG/DL (ref 70–130)
GLUCOSE BLDC GLUCOMTR-MCNC: 205 MG/DL (ref 70–130)
GLUCOSE BLDC GLUCOMTR-MCNC: 247 MG/DL (ref 70–130)
GLUCOSE SERPL-MCNC: 183 MG/DL (ref 65–99)
HBV SURFACE AG SERPL QL IA: NORMAL
HCT VFR BLD AUTO: 30.7 % (ref 34–46.6)
HGB BLD-MCNC: 10 G/DL (ref 12–15.9)
MAGNESIUM SERPL-MCNC: 1.8 MG/DL (ref 1.6–2.6)
MCH RBC QN AUTO: 27.4 PG (ref 26.6–33)
MCHC RBC AUTO-ENTMCNC: 32.6 G/DL (ref 31.5–35.7)
MCV RBC AUTO: 84.1 FL (ref 79–97)
PHOSPHATE SERPL-MCNC: 3.7 MG/DL (ref 2.5–4.5)
PLATELET # BLD AUTO: 193 10*3/MM3 (ref 140–450)
PMV BLD AUTO: 10.8 FL (ref 6–12)
POTASSIUM SERPL-SCNC: 4.6 MMOL/L (ref 3.5–5.2)
RBC # BLD AUTO: 3.65 10*6/MM3 (ref 3.77–5.28)
SODIUM SERPL-SCNC: 129 MMOL/L (ref 136–145)
VANCOMYCIN SERPL-MCNC: 16.1 MCG/ML (ref 5–40)
WBC NRBC COR # BLD: 16.34 10*3/MM3 (ref 3.4–10.8)

## 2022-05-03 PROCEDURE — 99232 SBSQ HOSP IP/OBS MODERATE 35: CPT | Performed by: STUDENT IN AN ORGANIZED HEALTH CARE EDUCATION/TRAINING PROGRAM

## 2022-05-03 PROCEDURE — 25010000002 PROPOFOL 10 MG/ML EMULSION: Performed by: NURSE ANESTHETIST, CERTIFIED REGISTERED

## 2022-05-03 PROCEDURE — 80069 RENAL FUNCTION PANEL: CPT | Performed by: INTERNAL MEDICINE

## 2022-05-03 PROCEDURE — 99222 1ST HOSP IP/OBS MODERATE 55: CPT | Performed by: INTERNAL MEDICINE

## 2022-05-03 PROCEDURE — C1750 CATH, HEMODIALYSIS,LONG-TERM: HCPCS | Performed by: STUDENT IN AN ORGANIZED HEALTH CARE EDUCATION/TRAINING PROGRAM

## 2022-05-03 PROCEDURE — 63710000001 INSULIN LISPRO (HUMAN) PER 5 UNITS: Performed by: HOSPITALIST

## 2022-05-03 PROCEDURE — C1769 GUIDE WIRE: HCPCS | Performed by: STUDENT IN AN ORGANIZED HEALTH CARE EDUCATION/TRAINING PROGRAM

## 2022-05-03 PROCEDURE — 97530 THERAPEUTIC ACTIVITIES: CPT

## 2022-05-03 PROCEDURE — 76000 FLUOROSCOPY <1 HR PHYS/QHP: CPT | Performed by: STUDENT IN AN ORGANIZED HEALTH CARE EDUCATION/TRAINING PROGRAM

## 2022-05-03 PROCEDURE — 83735 ASSAY OF MAGNESIUM: CPT | Performed by: INTERNAL MEDICINE

## 2022-05-03 PROCEDURE — 87340 HEPATITIS B SURFACE AG IA: CPT | Performed by: INTERNAL MEDICINE

## 2022-05-03 PROCEDURE — 85027 COMPLETE CBC AUTOMATED: CPT | Performed by: INTERNAL MEDICINE

## 2022-05-03 PROCEDURE — 0 LIDOCAINE 1 % SOLUTION 20 ML VIAL: Performed by: STUDENT IN AN ORGANIZED HEALTH CARE EDUCATION/TRAINING PROGRAM

## 2022-05-03 PROCEDURE — 5A1D70Z PERFORMANCE OF URINARY FILTRATION, INTERMITTENT, LESS THAN 6 HOURS PER DAY: ICD-10-PCS | Performed by: STUDENT IN AN ORGANIZED HEALTH CARE EDUCATION/TRAINING PROGRAM

## 2022-05-03 PROCEDURE — 02HV33Z INSERTION OF INFUSION DEVICE INTO SUPERIOR VENA CAVA, PERCUTANEOUS APPROACH: ICD-10-PCS | Performed by: STUDENT IN AN ORGANIZED HEALTH CARE EDUCATION/TRAINING PROGRAM

## 2022-05-03 PROCEDURE — C1894 INTRO/SHEATH, NON-LASER: HCPCS | Performed by: STUDENT IN AN ORGANIZED HEALTH CARE EDUCATION/TRAINING PROGRAM

## 2022-05-03 PROCEDURE — 63710000001 INSULIN LISPRO (HUMAN) PER 5 UNITS: Performed by: INTERNAL MEDICINE

## 2022-05-03 PROCEDURE — 99233 SBSQ HOSP IP/OBS HIGH 50: CPT | Performed by: PSYCHIATRY & NEUROLOGY

## 2022-05-03 PROCEDURE — 82962 GLUCOSE BLOOD TEST: CPT

## 2022-05-03 PROCEDURE — 80202 ASSAY OF VANCOMYCIN: CPT | Performed by: HOSPITALIST

## 2022-05-03 PROCEDURE — B5181ZA FLUOROSCOPY OF SUPERIOR VENA CAVA USING LOW OSMOLAR CONTRAST, GUIDANCE: ICD-10-PCS | Performed by: STUDENT IN AN ORGANIZED HEALTH CARE EDUCATION/TRAINING PROGRAM

## 2022-05-03 PROCEDURE — 25010000002 HEPARIN (PORCINE) PER 1000 UNITS: Performed by: STUDENT IN AN ORGANIZED HEALTH CARE EDUCATION/TRAINING PROGRAM

## 2022-05-03 PROCEDURE — 0JH63XZ INSERTION OF TUNNELED VASCULAR ACCESS DEVICE INTO CHEST SUBCUTANEOUS TISSUE AND FASCIA, PERCUTANEOUS APPROACH: ICD-10-PCS | Performed by: STUDENT IN AN ORGANIZED HEALTH CARE EDUCATION/TRAINING PROGRAM

## 2022-05-03 RX ORDER — DIPHENHYDRAMINE HYDROCHLORIDE 50 MG/ML
12.5 INJECTION INTRAMUSCULAR; INTRAVENOUS
Status: DISCONTINUED | OUTPATIENT
Start: 2022-05-03 | End: 2022-05-04

## 2022-05-03 RX ORDER — OLANZAPINE 5 MG/1
5 TABLET ORAL ONCE
Status: COMPLETED | OUTPATIENT
Start: 2022-05-03 | End: 2022-05-03

## 2022-05-03 RX ORDER — SODIUM CHLORIDE 9 MG/ML
INJECTION, SOLUTION INTRAVENOUS CONTINUOUS PRN
Status: DISCONTINUED | OUTPATIENT
Start: 2022-05-03 | End: 2022-05-03 | Stop reason: SURG

## 2022-05-03 RX ORDER — FLUMAZENIL 0.1 MG/ML
0.2 INJECTION INTRAVENOUS AS NEEDED
Status: DISCONTINUED | OUTPATIENT
Start: 2022-05-03 | End: 2022-05-04

## 2022-05-03 RX ORDER — HEPARIN SODIUM 1000 [USP'U]/ML
INJECTION, SOLUTION INTRAVENOUS; SUBCUTANEOUS AS NEEDED
Status: DISCONTINUED | OUTPATIENT
Start: 2022-05-03 | End: 2022-05-03 | Stop reason: HOSPADM

## 2022-05-03 RX ORDER — PROPOFOL 10 MG/ML
VIAL (ML) INTRAVENOUS CONTINUOUS PRN
Status: DISCONTINUED | OUTPATIENT
Start: 2022-05-03 | End: 2022-05-03 | Stop reason: SURG

## 2022-05-03 RX ORDER — INSULIN LISPRO 100 [IU]/ML
0-14 INJECTION, SOLUTION INTRAVENOUS; SUBCUTANEOUS EVERY 6 HOURS
Status: DISCONTINUED | OUTPATIENT
Start: 2022-05-03 | End: 2022-05-13 | Stop reason: HOSPADM

## 2022-05-03 RX ORDER — INSULIN LISPRO 100 [IU]/ML
0-9 INJECTION, SOLUTION INTRAVENOUS; SUBCUTANEOUS EVERY 6 HOURS
Status: DISCONTINUED | OUTPATIENT
Start: 2022-05-03 | End: 2022-05-03

## 2022-05-03 RX ORDER — IPRATROPIUM BROMIDE AND ALBUTEROL SULFATE 2.5; .5 MG/3ML; MG/3ML
3 SOLUTION RESPIRATORY (INHALATION) ONCE AS NEEDED
Status: DISCONTINUED | OUTPATIENT
Start: 2022-05-03 | End: 2022-05-04

## 2022-05-03 RX ORDER — OLANZAPINE 10 MG/1
10 TABLET ORAL 2 TIMES DAILY
Status: DISCONTINUED | OUTPATIENT
Start: 2022-05-03 | End: 2022-05-04

## 2022-05-03 RX ORDER — PROPOFOL 10 MG/ML
VIAL (ML) INTRAVENOUS AS NEEDED
Status: DISCONTINUED | OUTPATIENT
Start: 2022-05-03 | End: 2022-05-03 | Stop reason: SURG

## 2022-05-03 RX ORDER — LIDOCAINE HYDROCHLORIDE 20 MG/ML
INJECTION, SOLUTION INFILTRATION; PERINEURAL AS NEEDED
Status: DISCONTINUED | OUTPATIENT
Start: 2022-05-03 | End: 2022-05-03 | Stop reason: SURG

## 2022-05-03 RX ORDER — ONDANSETRON 2 MG/ML
4 INJECTION INTRAMUSCULAR; INTRAVENOUS ONCE AS NEEDED
Status: DISCONTINUED | OUTPATIENT
Start: 2022-05-03 | End: 2022-05-04

## 2022-05-03 RX ORDER — CLONIDINE HYDROCHLORIDE 0.1 MG/1
0.1 TABLET ORAL EVERY 6 HOURS SCHEDULED
Status: DISCONTINUED | OUTPATIENT
Start: 2022-05-03 | End: 2022-05-04

## 2022-05-03 RX ORDER — NALOXONE HCL 0.4 MG/ML
0.2 VIAL (ML) INJECTION AS NEEDED
Status: DISCONTINUED | OUTPATIENT
Start: 2022-05-03 | End: 2022-05-04

## 2022-05-03 RX ORDER — DIPHENHYDRAMINE HCL 25 MG
25 CAPSULE ORAL
Status: DISCONTINUED | OUTPATIENT
Start: 2022-05-03 | End: 2022-05-04

## 2022-05-03 RX ORDER — DEXMEDETOMIDINE HYDROCHLORIDE 4 UG/ML
.2-.5 INJECTION INTRAVENOUS
Status: DISCONTINUED | OUTPATIENT
Start: 2022-05-03 | End: 2022-05-04

## 2022-05-03 RX ADMIN — Medication 30 MG: at 08:48

## 2022-05-03 RX ADMIN — Medication 10 ML: at 08:05

## 2022-05-03 RX ADMIN — SERTRALINE 100 MG: 100 TABLET, FILM COATED ORAL at 08:04

## 2022-05-03 RX ADMIN — HYDRALAZINE HYDROCHLORIDE 10 MG: 10 TABLET, FILM COATED ORAL at 21:48

## 2022-05-03 RX ADMIN — LIDOCAINE HYDROCHLORIDE 60 MG: 20 INJECTION, SOLUTION INFILTRATION; PERINEURAL at 08:48

## 2022-05-03 RX ADMIN — INSULIN LISPRO 5 UNITS: 100 INJECTION, SOLUTION INTRAVENOUS; SUBCUTANEOUS at 17:30

## 2022-05-03 RX ADMIN — CLONIDINE HYDROCHLORIDE 0.1 MG: 0.1 TABLET ORAL at 11:59

## 2022-05-03 RX ADMIN — PANTOPRAZOLE SODIUM 40 MG: 40 INJECTION, POWDER, FOR SOLUTION INTRAVENOUS at 05:20

## 2022-05-03 RX ADMIN — PROPOFOL 50 MCG/KG/MIN: 10 INJECTION, EMULSION INTRAVENOUS at 08:48

## 2022-05-03 RX ADMIN — OLANZAPINE 5 MG: 5 TABLET ORAL at 14:03

## 2022-05-03 RX ADMIN — DEXMEDETOMIDINE HYDROCHLORIDE 0.7 MCG/KG/HR: 4 INJECTION INTRAVENOUS at 02:08

## 2022-05-03 RX ADMIN — HYDRALAZINE HYDROCHLORIDE 10 MG: 10 TABLET, FILM COATED ORAL at 14:03

## 2022-05-03 RX ADMIN — INSULIN LISPRO 3 UNITS: 100 INJECTION, SOLUTION INTRAVENOUS; SUBCUTANEOUS at 08:04

## 2022-05-03 RX ADMIN — ATORVASTATIN CALCIUM 40 MG: 20 TABLET, FILM COATED ORAL at 20:44

## 2022-05-03 RX ADMIN — INSULIN LISPRO 3 UNITS: 100 INJECTION, SOLUTION INTRAVENOUS; SUBCUTANEOUS at 11:58

## 2022-05-03 RX ADMIN — CLONIDINE HYDROCHLORIDE 0.1 MG: 0.1 TABLET ORAL at 17:28

## 2022-05-03 RX ADMIN — HEPARIN SODIUM 3800 UNITS: 1000 INJECTION INTRAVENOUS; SUBCUTANEOUS at 23:45

## 2022-05-03 RX ADMIN — Medication 10 ML: at 20:45

## 2022-05-03 RX ADMIN — AMLODIPINE BESYLATE 10 MG: 10 TABLET ORAL at 08:04

## 2022-05-03 RX ADMIN — OLANZAPINE 5 MG: 5 TABLET ORAL at 08:04

## 2022-05-03 RX ADMIN — ROPINIROLE HYDROCHLORIDE 0.75 MG: 0.5 TABLET, FILM COATED ORAL at 20:44

## 2022-05-03 RX ADMIN — SODIUM CHLORIDE: 9 INJECTION, SOLUTION INTRAVENOUS at 08:15

## 2022-05-03 RX ADMIN — OLANZAPINE 10 MG: 10 TABLET ORAL at 21:48

## 2022-05-03 RX ADMIN — DEXMEDETOMIDINE HYDROCHLORIDE 0.5 MCG/KG/HR: 4 INJECTION INTRAVENOUS at 14:03

## 2022-05-03 RX ADMIN — NICARDIPINE HYDROCHLORIDE 5 MG/HR: 25 INJECTION, SOLUTION INTRAVENOUS at 15:36

## 2022-05-03 NOTE — PROGRESS NOTES
Adult Nutrition  Assessment/PES    Patient Name:  Zaina Martinez  YOB: 1965  MRN: [id number]  Admit Date:  4/27/2022    Assessment Date:  5/3/2022    Comments:  Nutrition follow up.  Tolerating TF at goal of 40cc/hr but currently off as pt in OR for tunnelled cath placement.  Looks like last BM was 4/30 per nursing flowsheet. Labs, meds, skin reviewed. Failed her swallow eval yesterday, wasn't very cooperative. SLP is following. Will follow clinical course, nutrition support needs and hopefully transition to po diet.      Reason for Assessment     Row Name 05/03/22 0849          Reason for Assessment    Reason For Assessment follow-up protocol;TF/PN                Nutrition/Diet History     Row Name 05/03/22 0849          Nutrition/Diet History    Factors Affecting Nutritional Intake difficulty/impaired swallowing;cognitive status/motor function                Anthropometrics     Row Name 05/03/22 0520          Anthropometrics    Weight 59.9 kg (132 lb 0.9 oz)                Labs/Tests/Procedures/Meds     Row Name 05/03/22 0849          Labs/Procedures/Meds    Lab Results Reviewed reviewed, pertinent     Lab Results Comments Glu 183-205, BUn 39, Cr 5.04, Na+ 129            Diagnostic Tests/Procedures    Diagnostic Test/Procedure Reviewed reviewed, pertinent     Diagnostic Test/Procedures Comments in OR for Tunnel cath today            Medications    Pertinent Medications Reviewed reviewed, pertinent     Pertinent Medications Comments lipitor, admelog, synthroid, protonix, zoloft, precedex, cardene                Physical Findings     Row Name 05/03/22 0853          Physical Findings    Overall Physical Appearance scab, bruised;  incision     Enteral Access Devices nasogastric tube                Estimated/Assessed Needs - Anthropometrics     Row Name 05/03/22 0520          Anthropometrics    Weight 59.9 kg (132 lb 0.9 oz)                Nutrition Prescription Ordered     Row Name 05/03/22 0826           Nutrition Prescription PO    Current PO Diet NPO            Nutrition Prescription EN    Enteral Route NG     Product Novasource Renal (Nepro)     TF Delivery Method Continuous     Continuous TF Goal Rate (mL/hr) 40 mL/hr     Water flush (mL)  30 mL     Water Flush Frequency Every 4 hours                Evaluation of Received Nutrient/Fluid Intake     Row Name 05/03/22 0855          Calories Evaluation    Enteral Calories (kcal) 1920            Protein Evaluation    Enteral Protein (g) 87            Intake Assessment    Energy/Calorie Requirement Assessment meeting needs     Protein Requirement Assessment meeting needs     Fluid Requirement Assessment meeting needs     Tolerance tolerating            Fluid Intake Evaluation    Enteral Fluid (mL) 691     Other Fluid (mL) 180            EN Evaluation    TF Changes Held  for OR today     TF Tolerance Other (comment)  last BM 4/30     HOB Greater than or equal to 30 degress               Problem/Interventions:     Intervention Goal     Row Name 05/03/22 0856          Intervention Goal    General Maintain nutrition;Disease management/therapy;Nutrition support treatment;Improved nutrition related lab(s);Reduce/improve symptoms;Meet nutritional needs for age/condition     PO Initiate feeding;Tolerate PO     TF/PN Maintain TF/PN;Tolerate TF at goal     Transition TF to PO     Weight Maintain weight                Nutrition Intervention     Row Name 05/03/22 0856          Nutrition Intervention    RD/Tech Action Follow Tx progress;Care plan reviewd;Await begin PO                Nutrition Prescription     Row Name 05/03/22 0856          Nutrition Prescription EN    Enteral Prescription Continue same protocol                Education/Evaluation     Row Name 05/03/22 0856          Education    Education Will Instruct as appropriate            Monitor/Evaluation    Monitor Per protocol;Skin status;Symptoms;I&O;Pertinent labs;TF delivery/tolerance;Weight                  Electronically signed by:  Adriana Porter RD  05/03/22 08:57 EDT

## 2022-05-03 NOTE — PROGRESS NOTES
[facility] Progress Note      Patient Name: Zaina Martinez  : 1965  MRN: [id number]  Primary Care Physician:  Karson Oreilly MD  Date of admission: 2022    Subjective     Interval History:   Follow up ESRD. SP TDC .  BP requires cardene drip when agitated.  Restless, complains of pain all over.   Afebrile.   Review of Systems:   As noted above    Objective     Vitals:   Temp:  [98.1 °F (36.7 °C)-100 °F (37.8 °C)] 98.6 °F (37 °C)  Heart Rate:  [66-87] 77  Resp:  [16-21] 16  BP: ()/(56-94) 141/68    Intake/Output Summary (Last 24 hours) at 5/3/2022 0952  Last data filed at 5/3/2022 0944  Gross per 24 hour   Intake 1597 ml   Output 10 ml   Net 1587 ml       Physical Exam:   General: Awake, restless, agitated.  In restraints.  Does answer questions yes and no.    Skin:dry. No rash  HEENT: Cortrak. Nasal 02.  Oral mucosa dry  Neck:RIJ TDC  Lungs:clear to auscultation. Unlabored.    Heart:RRR no s3 or rub  Abdomen: + bs,soft, nontender   Extremities: trace lower ext edema.    Neuro: not oriented. Restless.  Moves all 4 ext.     Scheduled Meds:     amLODIPine, 10 mg, Oral, Q24H  [MAR Hold] atorvastatin, 40 mg, Oral, Nightly  cloNIDine, 0.2 mg, Nasogastric, Q6H  [MAR Hold] heparin (porcine), 3,000 Units, Intracatheter, Once  hydrALAZINE, 10 mg, Oral, Q8H  [MAR Hold] insulin lispro, 0-7 Units, Subcutaneous, 4x Daily With Meals & Nightly  [MAR Hold] levothyroxine, 125 mcg, Oral, Q AM  [MAR Hold] OLANZapine, 5 mg, Oral, BID  [MAR Hold] pantoprazole, 40 mg, Intravenous, Q AM  [MAR Hold] rOPINIRole, 0.75 mg, Oral, Nightly  [MAR Hold] sertraline, 100 mg, Oral, Daily  [MAR Hold] sodium chloride, 10 mL, Intravenous, Q12H  [MAR Hold] tamsulosin, 0.4 mg, Oral, Daily  Vancomycin Pharmacy Intermittent Dosing, , Does not apply, Daily      IV Meds:   dexmedetomidine, 0.2-1.5 mcg/kg/hr, Last Rate: 0.7 mcg/kg/hr (22 0835)  niCARdipine, 5-15 mg/hr, Last Rate: 2.5 mg/hr (22  7654)  Pharmacy Consult,   Pharmacy to dose vancomycin,         Results Reviewed:   I have personally reviewed the results from the time of this admission to 5/3/2022 09:52 EDT     Results from last 7 days   Lab Units 05/03/22  0340 05/02/22  0329 05/01/22  1050 04/28/22  0434 04/27/22  1615   SODIUM mmol/L 129* 130* 132*   < > 132*   POTASSIUM mmol/L 4.6 3.9 4.6   < > 6.7*   CHLORIDE mmol/L 96* 95* 96*   < > 94*   CO2 mmol/L 21.0* 22.3 21.3*   < > 24.1   BUN mg/dL 39* 26* 41*   < > 57*   CREATININE mg/dL 5.04* 4.23* 6.22*   < > 5.37*   CALCIUM mg/dL 7.9* 7.9* 8.0*   < > 9.0   BILIRUBIN mg/dL  --   --   --   --  0.9   ALK PHOS U/L  --   --   --   --  140*   ALT (SGPT) U/L  --   --   --   --  14   AST (SGOT) U/L  --   --   --   --  30   GLUCOSE mg/dL 183* 119* 99   < > 178*    < > = values in this interval not displayed.       Estimated Creatinine Clearance: 11.8 mL/min (A) (by C-G formula based on SCr of 5.04 mg/dL (H)).    Results from last 7 days   Lab Units 05/03/22  0340 05/02/22 0329 04/30/22 0327 04/29/22  0332 04/28/22  0434   MAGNESIUM mg/dL 1.8  --   --   --  1.7   PHOSPHORUS mg/dL 3.7 3.2 3.8   < > 2.6    < > = values in this interval not displayed.             Results from last 7 days   Lab Units 05/03/22  0340 05/02/22  0329 05/01/22  1050 04/29/22  2317 04/29/22 0332   WBC 10*3/mm3 16.34* 10.55 12.18* 17.03* 14.34*   HEMOGLOBIN g/dL 10.0* 10.8* 10.1* 11.0* 10.5*   PLATELETS 10*3/mm3 193 203 172 150 128*       Results from last 7 days   Lab Units 04/29/22  2317 04/27/22  1615   INR  0.97 1.02       Assessment / Plan     ASSESSMENT:  1.    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 Dialysis today using new TDC.  2.  Intracerebral bleed and altered mental status.  3.  Infected TDC, s/p removal 5/1. Staph aureus . On vanc.   4.  DM2    5.  Coronary artery disease  6.  Acute on chronic diastolic dysfunction . Remove volume with HD today.   7.  Anemia of CKD, on long-acting ANDRAE as an  outpatient.  8.  Hypertension. Requiring prn cardene when agitated. Norvasc increased starting today .  Would like to reduce clonidine as it acts centrally.     PLAN:  1.  HD today.   2. 2 K bath with HD.     Ruth Lira MD  05/03/22  09:52 EDT    [facility]  [phone]      Much of this encounter note is an electronic transcription/translation of spoken language to printed text. The electronic translation of spoken language may permit erroneous, or at times, nonsensical words or phrases to be inadvertently transcribed; Although I have reviewed the note for such errors, some may still exist.

## 2022-05-03 NOTE — OP NOTE
Date of Admission:  4/27/2022 05/03/22  Keli Salazar MD  [facility]    Preoperative Diagnosis: End stage renal disease    Postoperative Diagnosis: same as above    Procedure Performed:     1)  Tunneled catheter insertion into the Right internal jugular vein  2)  Radiologic supervision of catheter tip placement    CPT 35484, 07750, 57820    Surgeon: Keli Salazar MD    Assistant:  Meg Bustamante RN, Provided critical assistance in exposure, retraction, and suction that overall decrease blood loss and operative time.    Anesthesia: MAC with local    Estimated Blood Loss:  Minimal    Findings:     PatentRight internal jugular vein vein on ultrasound.  The pre existing catheter was retracted then transected and a stiff glidewire was advanced into the inferior vena cava under fluoroscopy.     Both blue and red ports draw and flush without resistance    Successful  placement of a 23 cm Palindrome catheter in the Right internal jugular vein.    Implants:    Nothing was implanted during the procedure    Specimen: none    Complications: none    Dispo: to PACU    Indication for procedure: 56 y.o. female with end stage renal disease on hemodialysis via a tunneled catheter.  She presented with infected tunneled catheter which was removed.  She now presents for tunneled catheter placement.  She had a temporary catheter placed several days ago.     Description of procedure:     The patient was taken to the operating room. Vein was interrogated with an ultrasound which appeared to be adequate for catheter placement. The vein puncture site from the previous catheter was good, just superior to the clavicle.  Surgical sites were prepped and draped in the usual sterile fashion. A full surgical timeout was performed.  The skin overlying the vein was infiltrated with local. The indwelling right IJV Shiley catheter had been prepped out.  It was retracted and transected, then a stiff glidewire was advanced into the inferior  vena cava.  15 bladed scalpel was used to enlarge the neck incision at the vascular insertion site.  The peel away sheath was placed into the right IJV under fluoroscopy. Local anesthetic was used to anesthetize the tunneling tract of the palindrome catheter.  Half centimeters skin neck was made at the planned exit site of the catheter.  Palindrome catheter was tunneled from the exit site to the vascular insertion site.  Wire and dilator were removed from the peel-away sheath.  Catheter was placed down into the peel-away sheath into the vena cava.  Catheter was withdrawn until adequate tip placement under fluoroscopic guidance.  Fluoroscopy of the apex of the catheter was performed to ensure no kinking.  Catheter was secured in place with nylon sutures.  Thre vascular access site was closed using Vicryl suture.  Sterile dressings were applied throughout.    Keli Salazar MD  05/03/22    Active Hospital Problems    Diagnosis  POA   • **Encephalopathy, toxic [G92.9]  Unknown   • Acute CVA (cerebrovascular accident) (HCC) [I63.9]  Unknown   • Intracranial hemorrhage (HCC) [I62.9]  Unknown   • Acute metabolic encephalopathy [G93.41]  Yes   • Leukocytosis [D72.829]  Yes   • Acute on chronic diastolic CHF (congestive heart failure) (HCC) [I50.33]  Yes   • CAD (coronary artery disease) [I25.10]  Yes   • ESRD (end stage renal disease) (HCC) [N18.6]  Yes   • Hypertensive disorder [I10]  Yes   • Acute DM type 2 causing complication (HCC) [E11.8]  Yes      Resolved Hospital Problems   No resolved problems to display.       .

## 2022-05-03 NOTE — PROGRESS NOTES
"DOS: 5/3/2022  NAME: Zaina Martinez   : 1965  PCP: Karson Oreilly MD  Chief Complaint   Patient presents with   • Altered Mental Status     PT WITH AMS, LAST SEEN NORMAL APPROX. 0800 TODAY; PT NOT TALKING, LEFT  WEAKNESS; PT IS ON DIALYSIS BUT HAS MISSED HER LAST APPOINTMENT; PT WEARING FACE MASK       Chief complaint: Stroke  Subjective: Patient was following commands better this morning.    Objective:  Vital signs: /75   Pulse 70   Temp 97.8 °F (36.6 °C) (Oral)   Resp 16   Ht 157.5 cm (62.01\")   Wt 59.9 kg (132 lb 0.9 oz)   SpO2 96%   BMI 24.15 kg/m²    Gen: NAD, vitals reviewed  MS: Awake, inattentive, language intact, follows commands intermittently, no neglect  CN: Blinks to threat bilaterally, pupils 3 mm, reactive, conjugate gaze, no facial asymmetry  Motor: Moving all 4 extremities, normal tone throughout, in restraints  Sensory: Withdraws to painful stimuli bilaterally  Reflexes: Okay, no obvious hyperreflexia  Difficult to assess coordination and gait    ROS:  Unable to obtain due to encephalopathy      Laboratory results:  Lab Results   Component Value Date    GLUCOSE 183 (H) 2022    CALCIUM 7.9 (L) 2022     (L) 2022    K 4.6 2022    CO2 21.0 (L) 2022    CL 96 (L) 2022    BUN 39 (H) 2022    CREATININE 5.04 (H) 2022    EGFRIFAFRI  2022      Comment:      <15 Indicative of kidney failure.    EGFRIFNONA 14 (L) 2022    BCR 7.7 2022    ANIONGAP 12.0 2022     Lab Results   Component Value Date    WBC 16.34 (H) 2022    HGB 10.0 (L) 2022    HCT 30.7 (L) 2022    MCV 84.1 2022     2022     Lab Results   Component Value Date    LDL 80 2022         Lab 22  1103   HEMOGLOBIN A1C 5.80*        Review of labs: Morning labs reviewed    Review and interpretation of imaging: I personally reviewed her imaging and labs while in the hospital.  Her initial MRI was negative for " any acute findings, whereas the follow-up MRI did show left frontal ICH.  Her initial MRI did show left frontal FLAIR changes, but diffusion was negative.  Her CT head has been stable with left frontal ICH.  Her BUN and/creatinine 39/5.04, sodium of 129 and WBC of 16.3  EEG shows no signs of seizures.    2D echo shows no evidence of vegetation    Diagnoses:  Spontaneous left subcortical intracerebral hemorrhage  Received TNK  End-stage renal disease on hemodialysis  Encephalopathy, metabolic.  Infected dialysis catheter    Comment: Her left frontal ICH is atypical, as her diffusion was negative in the initial MRI, although it did have some flair changes, which seems to have bled and caused her to have ICH.  Patient continues to be stable clinically, and her EEG is negative.    Plan:  1.  Okay for continue HD from a neurology standpoint  2.  Continue to avoid aspirin/anticoagulation for now  3.  EEG was negative.  4.  Patient is on Precedex, wean off as per the ICU team.  5.  I would recommend getting repeat MRI brain with and without contrast in about 3 months.  6.  Increase her activity with PT/OT.  Try to wean off her Precedex.  7.  Continue supportive care.    Case was discussed with patient's nurse, and the primary team.  We will follow patient peripherally, please call us with questions.  Thank you for the consult.

## 2022-05-03 NOTE — CONSULTS
Date of Consultation: 22    Referral Provider: Dr. Isbell     Reason for Consultation: MRSA catheter infection, evaluate for ANJEL    Encounter Provider: Bradley Montoya MD    Group of Service: [facility]     Patient Name: Zaina Martinez    :1965    Chief complaint:  Confusion, infected tunnel cath    History of Present Illness:  Ms Martinez is a 56 year old female with history of hypertension, hyperlipidemia, diabetes, hypothyroidism, chronic kidney disease, COVID pneumonia (2021) and rheumatoid arthritis. She was previously evaluated by Dr. Agrawal during an admission in 2021 for facial edema and hypertension, with an echocardiogram completed at that time demonstrating normal LVSF with EF 61.8%, as well as a grade II diastolic dysfunction and trace TR. She was found to have profound hypothyroidism and ultimately started on dialysis during admission via tunneled catheter.     She presented back to [facility] 22 with reports of confusion and 2 missed dialysis sessions. She underwent emergent dialysis upon arrival, and her dialysis cathter was removed and replaced as it was grossly infected with purulent drainage. She was placed on IV Vancomycin per I/D recommendations. BC negative x2, but tunneled cath tip positive for MRSA.     She was evaluated by neurology, but developed sudden onset apashia and left sided hemiparesis 22 and was subsequently treated with TPA. Follow up CTH noted an increasing ICH, for which she was evaluated by neurosurgery with recommendations noted to hold her AC for 10-14 days. An echocardiogram completed noted normal LVSF with EF 66.8%, as well as a grade II diastolic dysfunction, moderate PHTN (RVSP 48.5 mmHg) and mild MR. We have been consulted for a possible ANJEL.  When I saw her, she is still very confused and has significant altered mental status.  She appears ill, and was agitated in bed.    Echocardiogram 22  · Calculated  left ventricular EF = 66.8% Estimated left ventricular EF was in agreement with the calculated left ventricular EF. Left ventricular systolic function is normal.  · Left ventricular diastolic function is consistent with (grade II w/high LAP) pseudonormalization. Moderate to severely enlarged left atrial cavity noted.  · Moderate pulmonary hypertension is present.Calculated right ventricular systolic pressure from tricuspid regurgitation is 48.5 mmHg.  · Saline test results are negative for right to left atrial level shunt.  · Severe mitral annular calcification is present. Mild mitral valve regurgitation is present. No significant mitral valve stenosis is present.  · There is a small (<1cm) pericardial effusion adjacent to the left ventricle.    Previous Cardiac Testing:    Echocardiogram 11/30/21  · Calculated left ventricular EF = 61.8% Estimated left ventricular EF was in agreement with the calculated left ventricular EF. Left ventricular systolic function is normal.  · Left ventricular diastolic function is consistent with (grade II w/high LAP) pseudonormalization.  · The left atrial cavity is mildly dilated.  · Trace tricuspid valve regurgitation is present. Calculated right ventricular systolic pressure from tricuspid regurgitation is 21.4 mmHg.          Past Medical History:   Diagnosis Date   • Acute CVA (cerebrovascular accident) ([facility]) 5/1/2022   • Acute on chronic diastolic CHF (congestive heart failure) ([facility])    • CAD (coronary artery disease) 12/20/2021   • Diabetes ([facility])    • Disease of thyroid gland    • GERD (gastroesophageal reflux disease)    • Hyperlipidemia 11/30/2018   • Hypertension    • Rheumatoid arthritis (HCC)          Past Surgical History:   Procedure Laterality Date   • CHOLECYSTECTOMY WITH INTRAOPERATIVE CHOLANGIOGRAM N/A 1/10/2022    Procedure: Laparoscopic cholecystectomy with intraoperative cholangiogram;  Surgeon: Ramana Raygoza MD;  Location: [facility];  Service: General;   Laterality: N/A;   • EYE SURGERY     • HYSTERECTOMY     • INSERTION HEMODIALYSIS CATHETER N/A 12/6/2021    Procedure: HEMODIALYSIS CATHETER INSERTION;  Surgeon: Keli Salazar MD;  Location: [facility] 18/19;  Service: Vascular;  Laterality: N/A;         Allergies   Allergen Reactions   • Contrast Dye Confusion   • Ibuprofen Other (See Comments)     Kidney disease   • Prochlorperazine Other (See Comments)     Dystonic reaction              No current facility-administered medications on file prior to encounter.     Current Outpatient Medications on File Prior to Encounter   Medication Sig Dispense Refill   • acetaminophen (TYLENOL) 325 MG tablet Take 650 mg by mouth Every 4 (Four) Hours As Needed for Mild Pain .     • amLODIPine (NORVASC) 5 MG tablet Take 10 mg by mouth Daily.     • aspirin (aspirin) 81 MG EC tablet Take 81 mg by mouth Daily.     • folic acid (FOLVITE) 1 MG tablet Take 1 mg by mouth Daily.     • furosemide (LASIX) 40 MG tablet Take 1 tablet by mouth 2 (Two) Times a Day. 60 tablet 0   • hydrALAZINE (APRESOLINE) 100 MG tablet Take 1 tablet by mouth 3 (Three) Times a Day for 30 days. 90 tablet 0   • HYDROcodone-acetaminophen (NORCO) 5-325 MG per tablet Take 1 tablet by mouth Every 6 (Six) Hours As Needed for Moderate Pain . 20 tablet 0   • insulin glargine (LANTUS, SEMGLEE) 100 UNIT/ML injection Inject 10 Units under the skin into the appropriate area as directed Daily.     • insulin lispro (humaLOG) 100 UNIT/ML injection Inject 3 Units under the skin into the appropriate area as directed 3 (Three) Times a Day Before Meals.     • levothyroxine (SYNTHROID, LEVOTHROID) 125 MCG tablet Take 125 mcg by mouth Daily.     • metoprolol tartrate (LOPRESSOR) 25 MG tablet Take 25 mg by mouth 2 (Two) Times a Day.     • multivitamin with minerals (MULTIVITAMIN ADULT PO) Take 1 tablet by mouth Daily.     • OLANZapine (zyPREXA) 5 MG tablet Take 5 mg by mouth 2 (Two) Times a Day.     • pantoprazole  "(PROTONIX) 40 MG EC tablet Take 40 mg by mouth Daily.     • rOPINIRole (REQUIP) 0.25 MG tablet Take 0.75 mg by mouth every night at bedtime.     • sertraline (ZOLOFT) 100 MG tablet Take 100 mg by mouth Daily.     • tamsulosin (FLOMAX) 0.4 MG capsule 24 hr capsule Take 1 capsule by mouth Daily.     • vitamin D3 125 MCG (5000 UT) capsule capsule Take 2,000 Units by mouth Daily.           Social History     Socioeconomic History   • Marital status:    Tobacco Use   • Smoking status: Never Smoker   • Smokeless tobacco: Never Used   Vaping Use   • Vaping Use: Never used   Substance and Sexual Activity   • Alcohol use: Never   • Drug use: Never   • Sexual activity: Defer         Family History   Problem Relation Age of Onset   • Malig Hyperthermia Neg Hx        REVIEW OF SYSTEMS:   Pertinent positives were noted in the HPI above.  Otherwise, all other symptoms were reviewed, and are negative.       Objective:     Vitals:    05/03/22 1100 05/03/22 1200 05/03/22 1300 05/03/22 1400   BP: 141/72 132/90 140/75 160/79   BP Location:       Patient Position:       Pulse: 75 67 70 83   Resp:       Temp:  97.8 °F (36.6 °C)     TempSrc:  Oral     SpO2: 92% 94% 96% 96%   Weight:       Height:         Body mass index is 24.15 kg/m².  Flowsheet Rows    Flowsheet Row First Filed Value   Admission Height 157.5 cm (62\") Documented at 04/28/2022 0427   Admission Weight 54.8 kg (120 lb 13 oz) Documented at 04/28/2022 0427           General:    Agitated, confused.  In restraints.                   Head:    Normocephalic, atraumatic.   Eyes:          Conjunctivae and sclerae normal, no icterus, PERRLA   Throat:   No oral lesions, no thrush, oral mucosa moist.    Neck:   Supple, trachea midline.   Lungs:     Clear to auscultation bilaterally anteriorly    Heart:    Regular rhythm and normal rate.  No murmurs, gallops, or rubs noted.   Abdomen:     Soft, non-tender, non-distended, positive bowel sounds.    Extremities:  Trace edema of " the lower extremities   Pulses:   Pulses palpable and equal bilaterally.    Skin:   No bleeding or rash.   Neuro:  Confused   Psychiatric:  Confused and agitated           Lab Review:                Results from last 7 days   Lab Units 05/03/22  0340   SODIUM mmol/L 129*   POTASSIUM mmol/L 4.6   CHLORIDE mmol/L 96*   CO2 mmol/L 21.0*   BUN mg/dL 39*   CREATININE mg/dL 5.04*   GLUCOSE mg/dL 183*   CALCIUM mg/dL 7.9*         Results from last 7 days   Lab Units 05/03/22  0340   WBC 10*3/mm3 16.34*   HEMOGLOBIN g/dL 10.0*   HEMATOCRIT % 30.7*   PLATELETS 10*3/mm3 193     Results from last 7 days   Lab Units 04/29/22  2317 04/27/22  1615   INR  0.97 1.02   APTT seconds 29.4 28.2     Results from last 7 days   Lab Units 04/30/22  0327   CHOLESTEROL mg/dL 145     Results from last 7 days   Lab Units 05/03/22  0340   MAGNESIUM mg/dL 1.8     Results from last 7 days   Lab Units 04/30/22  0327   CHOLESTEROL mg/dL 145   TRIGLYCERIDES mg/dL 119   HDL CHOL mg/dL 44   LDL CHOL mg/dL 80       EKG (reviewed by me personally):    Admit EKG 4/27/22    Previous EKG 2/23/22      Assessment:   1.  MRSA hemodialysis catheter infection   2.  Left frontal intracerebral hemorrhage status post lytic therapy  3.  End-stage renal disease, on hemodialysis  4.  Altered mental status, multifactorial  5.  Acute on chronic diastolic CHF  6.  Pulmonary hypertension  7.  Reported history of coronary artery disease  8.  COVID pneumonia in April 2022  9.  Rheumatoid arthritis    Plan:       The patient does have a recently removed MRSA hemodialysis catheter.  However, she has an intracranial hemorrhage in the setting of a possible embolic stroke prior to this.  A transesophageal echocardiogram is reasonable.  However, the patient is agitated, confused, and currently has altered mental status from multiple issues (including a brain bleed).  I do not feel that the ANJEL is urgent, and she would need to be much better from a medical standpoint before I  would want to perform this for her safety.  We will follow for now, and see if a ANJEL is feasible in the next several days.    Thank you very much for this consult.    German Montoya MD

## 2022-05-03 NOTE — ANESTHESIA POSTPROCEDURE EVALUATION
"Patient: Zaina Martinez    Procedure Summary     Date: 05/03/22 Room / Location: [facility] OR  / [facility] MAIN OR    Anesthesia Start: 0835 Anesthesia Stop: 0944    Procedure: TUNNELED CATHETER PLACEMENT (N/A ) Diagnosis:     Surgeons: Keli Salazar MD Provider: Jacob Calderon MD    Anesthesia Type: MAC ASA Status: 3          Anesthesia Type: MAC    Vitals  Vitals Value Taken Time   /75 05/03/22 1301   Temp 36.6 °C (97.8 °F) 05/03/22 1200   Pulse 72 05/03/22 1314   Resp     SpO2 95 % 05/03/22 1314   Vitals shown include unvalidated device data.        Post Anesthesia Care and Evaluation    Patient location during evaluation: bedside  Patient participation: complete - patient participated  Level of consciousness: awake and alert  Pain management: adequate  Airway patency: patent  Anesthetic complications: No anesthetic complications    Cardiovascular status: acceptable  Respiratory status: acceptable  Hydration status: acceptable    Comments: /75   Pulse 70   Temp 36.6 °C (97.8 °F) (Oral)   Resp 16   Ht 157.5 cm (62.01\")   Wt 59.9 kg (132 lb 0.9 oz)   SpO2 96%   BMI 24.15 kg/m²           "

## 2022-05-03 NOTE — PROGRESS NOTES
"                                              LOS: 6 days   Patient Care Team:  Karson Oreilly MD as PCP - General (Family Medicine)    Chief Complaint:  F/up stroke, intracranial hemorrhage and critical care management    Subjective   Interval History      Remains restless at times and slightly agitated.  Requiring Precedex currently at 0.5 mics/KG/minute.  Still on nicardipine as well for elevated blood pressure.    She is confused.  She answers questions sometimes but her answers are mostly out of context.  Blood sugar is elevated    EEG negative for seizure.    REVIEW OF SYSTEMS:   Cannot obtain due to altered mental status    Ventilator/Non-Invasive Ventilation Settings (From admission, onward)            None                Physical Exam:     Vital Signs  Temp:  [97.8 °F (36.6 °C)-100 °F (37.8 °C)] 97.8 °F (36.6 °C)  Heart Rate:  [66-83] 83  Resp:  [16-21] 16  BP: ()/(56-94) 160/79    Intake/Output Summary (Last 24 hours) at 5/3/2022 1454  Last data filed at 5/3/2022 1200  Gross per 24 hour   Intake 1820 ml   Output 10 ml   Net 1810 ml     Flowsheet Rows    Flowsheet Row First Filed Value   Admission Height 157.5 cm (62\") Documented at 04/28/2022 0427   Admission Weight 54.8 kg (120 lb 13 oz) Documented at 04/28/2022 0427          PPE used per hospital policy    General Appearance:   Alert.  Intermittently cooperative.  In no distress.   ENMT:  Gibson 4.  Moist tongue   Eyes:  Pupils equal and reactive to light. EOMI   Neck:    Trachea midline. No thyromegaly.   Lungs:    Clear to auscultation,respirations regular, even and nonlabored    Heart:   Regular rhythm and normal rate, normal S1 and S2, no         murmur   Skin:   No rash or ecchymosis   Abdomen:     Soft. No tenderness. No HSM.   Neuro/psych:  Conscious, alert.  Confused.  Moves all extremities and seems symmetrical.   Extremities:  No cyanosis, clubbing or edema.  Warm extremities and well-perfused          Results Review:        Results " from last 7 days   Lab Units 05/03/22  0340 05/02/22  0329 05/01/22  1050   SODIUM mmol/L 129* 130* 132*   POTASSIUM mmol/L 4.6 3.9 4.6   CHLORIDE mmol/L 96* 95* 96*   CO2 mmol/L 21.0* 22.3 21.3*   BUN mg/dL 39* 26* 41*   CREATININE mg/dL 5.04* 4.23* 6.22*   GLUCOSE mg/dL 183* 119* 99   CALCIUM mg/dL 7.9* 7.9* 8.0*         Results from last 7 days   Lab Units 05/03/22  0340 05/02/22  0329 05/01/22  1050   WBC 10*3/mm3 16.34* 10.55 12.18*   HEMOGLOBIN g/dL 10.0* 10.8* 10.1*   HEMATOCRIT % 30.7* 32.3* 29.6*   PLATELETS 10*3/mm3 193 203 172     Results from last 7 days   Lab Units 04/29/22  2317 04/27/22  1615   INR  0.97 1.02   APTT seconds 29.4 28.2                       Results from last 7 days   Lab Units 04/27/22  1648   PH, ARTERIAL pH units 7.395   PCO2, ARTERIAL mm Hg 44.0   PO2 ART mm Hg 52.8*   MODALITY  Room Air   O2 SATURATION CALC % 86.6*         I reviewed the patient's new clinical results.        Medication Review:   amLODIPine, 10 mg, Oral, Q24H  atorvastatin, 40 mg, Oral, Nightly  cloNIDine, 0.1 mg, Nasogastric, Q6H  heparin (porcine), 3,000 Units, Intracatheter, Once  hydrALAZINE, 10 mg, Oral, Q8H  insulin lispro, 0-14 Units, Subcutaneous, Q6H  levothyroxine, 125 mcg, Oral, Q AM  OLANZapine, 10 mg, Oral, BID  pantoprazole, 40 mg, Intravenous, Q AM  rOPINIRole, 0.75 mg, Oral, Nightly  sertraline, 100 mg, Oral, Daily  sodium chloride, 10 mL, Intravenous, Q12H  tamsulosin, 0.4 mg, Oral, Daily  vancomycin, 500 mg, Intravenous, Once  Vancomycin Pharmacy Intermittent Dosing, , Does not apply, Daily        dexmedetomidine, 0.2-1.5 mcg/kg/hr, Last Rate: 0.5 mcg/kg/hr (05/03/22 1403)  niCARdipine, 5-15 mg/hr, Last Rate: 5 mg/hr (05/03/22 1406)  Pharmacy Consult,   Pharmacy to dose vancomycin,         Diagnostic imaging:  I personally and independently reviewed the following images:  CT brain 4/30/2022 compared to 4/29/2022: Slight enlargement in the left frontal hemorrhage.  No midline shift or  mass-effect      CXR 5/1/2022: Increased pulmonary vascular markings.  Dialysis catheter in good position.      CT head 5/1/2022: No change in intracranial hemorrhage.    CXR 5/3/2022: Line in good place.  Increased vascular markings but no edema.    Assessment     1. Right MCA stroke syndrome (MRI negative for embolic/ischemic stroke), s/p TNKase  2. Spontaneous left frontal intracerebral hemorrhage  3. ESRD on HD TTS  4. Hemodialysis cath infection with MRSA, removed.  New Shiley catheter and right IJ inserted 5/1  5. Sepsis, resolved  6. Hyponatremia, clinically significant  7. Encephalopathy, metabolic  8. Pulmonary hypertension on echo 4/29/2022, RVSP 48.  Moderate to severely enlarged LA.  Grade 2 diastolic dysfunction.  9. Pulmonary vascular congestion/fluid overload, improved  10. Mild MR  11. Mild leukocytosis, probably stress-induced    Pertinent medical problems:  · Bipolar disorder  · DM type II  · CAD  · Anemia of CKD  · HTN        Plan     · Nicardipine drip to keep SBP <150.  Amlodipine 5 mg daily.  Continue clonidine  · Precedex drip.  Titrate wean off.  Clonidine taper  · ICU care.  Neuro check. EEG. Repeat MRI in 3 months  · Antibiotics with vancomycin.  Repeat blood culture x2 4/28 negative so far.  Echo showed no evidence of endocarditis.  fever resolved. ID recommending ANJEL but there was no evidence of embolic stroke on work-up (negative MRI).   · Dialysis today.  Tunneled dialysis catheter placed.  · Tube feeding. Speech eval  · Off Aspirin Plavix due to bleed  · Change Accu-Chek to every 6 hours increase insulin to moderate dose  · Increase olanzapine to 10 mg twice daily      Discussed with ICU staff during the multidisciplinary round.  Discussed Dr. Bull.  Discussed with patient's daughter at bedside    Alex Randall MD  05/03/22  14:54 EDT      Time: Critical care 33 min      This note was dictated utilizing [place] dictation

## 2022-05-03 NOTE — PLAN OF CARE
"Goal Outcome Evaluation:  Plan of Care Reviewed With: patient           Outcome Evaluation: OT saw pt this date in ICU, she is very confused. Often talking to people that are not present and states \"stop talking I am on the phone\" but she is not. She is more conversational today, some nonsensical speech/word salad when answering some questions but poorly following commands. Pt sat EOB this date with max AX2 for bed mobility and max A for sitting balance as she is pushing heavy into trunk extension. OT attempted to engage in ADLs but pt stating \"no\" or poorly following commands to participate. She was admitted with L sided weakness but does not tolerate OT assisting with touch/ROM to BUE. Will continue to progress as tolerated. PRATEEK recommending at this time based on current status.    OT wore a mask, eye protection, gloves, and completed hand hygiene.   "

## 2022-05-03 NOTE — PROGRESS NOTES
LOS: 6 days     Chief Complaint: Sepsis    Interval History: Remains encephalopathic.  Patient does report she feels as though her restless legs are bothering her.  Denies any fevers or chills.  Denies any nausea, vomiting, diarrhea.    T-max of 100 over the last 24 hours.  Blood cultures remain negative at 4 days.  Increase leukocytosis from 10 to 16.  Status post tunneled line placement today.    Vital Signs  Temp:  [98.1 °F (36.7 °C)-100 °F (37.8 °C)] 98.6 °F (37 °C)  Heart Rate:  [66-87] 77  Resp:  [16-21] 16  BP: ()/(56-94) 141/68    Physical Exam:  General: In no acute distress  Cardiovascular: RRR, no LE edema   Respiratory: Clear to ascultation bilaterally  GI: Soft, NT/ND, + bowel sounds bilaterally  Skin: No rashes.  New catheter site clean and intact.    Antibiotics:  Vancomycin dosing per pharmacy     Results Review:    Lab Results   Component Value Date    WBC 16.34 (H) 05/03/2022    HGB 10.0 (L) 05/03/2022    HCT 30.7 (L) 05/03/2022    MCV 84.1 05/03/2022     05/03/2022     Lab Results   Component Value Date    GLUCOSE 183 (H) 05/03/2022    BUN 39 (H) 05/03/2022    CREATININE 5.04 (H) 05/03/2022    EGFRIFNONA 14 (L) 02/28/2022    EGFRIFAFRI  01/13/2022      Comment:      <15 Indicative of kidney failure.    BCR 7.7 05/03/2022    CO2 21.0 (L) 05/03/2022    CALCIUM 7.9 (L) 05/03/2022    ALBUMIN 3.00 (L) 05/03/2022    AST 30 04/27/2022    ALT 14 04/27/2022       Microbiology:  4/27 urine culture <10K MRSA  4/28 blood cultures NGTD x 2  4/28 catheter tip culture from hemodialysis catheter with MRSA    Assessment/Plan   #Sepsis  #Hemodialysis catheter related infection with MRSA, now removed  #Left frontal ICH  #Acute encephalopathy  #Type 2 diabetes  #ESRD on hemodialysis    Continue intermittent dosed IV vancomycin goal -600 for MRSA infection associated with her hemodialysis catheter. Blood cultures have remained negative and TTE was without any vegetation.  Plan to consult  cardiology for ANJEL eval since she has had a possible embolic stroke in the setting of a MRSA catheter infection, though it would be unlikely for her blood cultures to remain negative in the setting of endocarditis.    ID will follow.

## 2022-05-03 NOTE — PROGRESS NOTES
"[facility] Clinical Pharmacy Services: Vancomycin Monitoring Note    Zaina Martinez is a 56 y.o. female who is on day 5 of pharmacy to dose vancomycin for SSTI per Dr. Soriano's request. End date TBD-set to  on  for now. Dr. Canas following for sepsis and HD catheter-related infection w/MRSA.    Previous Vancomycin Dose:  500 mg IV once  @1711 (none given on 22)    Updated Cultures and Sensitivities: catheter culture MRSA, blood cx NGTD x 2  Results from last 7 days   Lab Units 22  0340 22  0329 22  0329   VANCOMYCIN RM mcg/mL 16.10 19.70 16.80     Vitals/Labs  Ht: 157.5 cm (62.01\"); Wt: 59.9 kg (132 lb 0.9 oz)   Temp Readings from Last 1 Encounters:   22 97.8 °F (36.6 °C) (Oral)     Estimated Creatinine Clearance: 11.8 mL/min (A) (by C-G formula based on SCr of 5.04 mg/dL (H)).   Dialysis TTS    Results from last 7 days   Lab Units 22  0340 22  0329 22  1050   CREATININE mg/dL 5.04* 4.23* 6.22*   WBC 10*3/mm3 16.34* 10.55 12.18*     Assessment/Plan    1. Current Vancomycin Dose: intermittent due to ESRD/HD. Will plan for HD on 5/3 and then a dose of 500 mg IV once  2. Next Level Date and Time: Vanc Random on 22 at AM labs  3. We will continue to monitor patient changes and renal function     Thank you for involving pharmacy in this patient's care. Please contact pharmacy with any questions or concerns.       Reji Lopez, PharmD  Pharmacy Resident  22 13:15 EDT            "

## 2022-05-03 NOTE — PROGRESS NOTES
Continued Stay Note  [facility]     Patient Name: Zaina Martinez  MRN: [id number]  Today's Date: 5/3/2022    Admit Date: 4/27/2022     Discharge Plan     Row Name 05/03/22 1557       Plan    Plan PRATEEK pending    Plan Comments Therapy's following  R2R given to daughter to make SNF choices               Discharge Codes    No documentation.               Expected Discharge Date and Time     Expected Discharge Date Expected Discharge Time    May 6, 2022             Teagan Marion RN

## 2022-05-03 NOTE — ANESTHESIA PREPROCEDURE EVALUATION
Anesthesia Evaluation     Patient summary reviewed and Nursing notes reviewed   no history of anesthetic complications:  NPO Solid Status: > 6 hours  NPO Liquid Status: > 6 hours           Airway   Dental      Pulmonary    (+) pleural effusion, shortness of breath,   (-) rhonchi, decreased breath sounds, wheezes, rales, stridor  Cardiovascular     NYHA Classification: I  ECG reviewed  Patient on routine beta blocker and Beta blocker given within 24 hours of surgery    (+) hypertension 2 medications or greater, past MI , CAD, CHF , JACKSON, PVD, hyperlipidemia,   (-) weak pulses, friction rub, JVD      Neuro/Psych  (+) CVA residual symptoms,      ROS Comment: Metabolic Encephalopathy  GI/Hepatic/Renal/Endo    (+)  GERD,  renal disease ESRD, diabetes mellitus,     Musculoskeletal (-) negative ROS    Abdominal    Substance History - negative use     OB/GYN negative ob/gyn ROS         Other - negative ROS                     Anesthesia Plan    ASA 3     MAC     intravenous induction           CODE STATUS:    Code Status (Patient has no pulse and is not breathing): CPR (Attempt to Resuscitate)  Medical Interventions (Patient has pulse or is breathing): Full

## 2022-05-03 NOTE — PROGRESS NOTES
"[facility] Clinical Pharmacy Services: Clonidine Taper for Weaning Dexmedetomidine    Pharmacy has been consulted to start oral/enteral clonidine on Zaina Martinez to assist in weaning off dexmedetomidine per Dr. Randall's request. Dexmedetomidine withdrawal symptoms include tachycardia and increased agitation, so weaning will be done slowly with the goal of avoiding any withdrawal symptoms.     Relevant clinical data and objective history reviewed:  56 y.o. female 157.5 cm (62.01\") 59.9 kg (132 lb 0.9 oz)    Creatinine   Date Value Ref Range Status   05/03/2022 5.04 (H) 0.57 - 1.00 mg/dL Final   05/02/2022 4.23 (H) 0.57 - 1.00 mg/dL Final   05/01/2022 6.22 (H) 0.57 - 1.00 mg/dL Final     BUN   Date Value Ref Range Status   05/03/2022 39 (H) 6 - 20 mg/dL Final     Estimated Creatinine Clearance: 11.8 mL/min (A) (by C-G formula based on SCr of 5.04 mg/dL (H)).    Assessment    Dexmedetomidine 0 infusing at 0.5 mcg/kg/hr    Rass: RASS (Avila Agitation-Sedation Scale): 0-->alert and calm  CPOT: CPOT Score: 0    Plan    Not adjusting clonidine at this time due to possible pressure concerns with HD. Dr. Lira increased amlodipine and reduced clonidine as it acts centrally. Continue clonidine 0.1 mg NG q6 on. Hold for  MAP <65.  RN to decrease dexmedetomidine dose by 25% 3-4 hours after clonidine initiation.  Pharmacy will assess patient every 24 hrs for hypotension, signs of agitation, uncontrolled pain, toleration of clonidine (skipped or held doses), and PRN medications used to treat agitation.    Thank you for allowing me to participate in your patient's care. Please call pharmacy with any questions or concerns.    Reji Lopez, PharmD  Pharmacy Resident  05/03/22 13:39 EDT        "

## 2022-05-03 NOTE — PROGRESS NOTES
"DAILY PROGRESS NOTE  [facility]    Patient Identification:  Name: Zaina Martinez  Age: 56 y.o.  Sex: female  :  1965  MRN: [id number]         Primary Care Physician: Karson Oreilly MD    Subjective:  Interval History: She is weak.  Confused and requiring restraints. Talking more.    Objective:    Scheduled Meds:amLODIPine, 10 mg, Oral, Q24H  atorvastatin, 40 mg, Oral, Nightly  cloNIDine, 0.1 mg, Nasogastric, Q6H  heparin (porcine), 3,000 Units, Intracatheter, Once  hydrALAZINE, 10 mg, Oral, Q8H  insulin lispro, 0-14 Units, Subcutaneous, Q6H  levothyroxine, 125 mcg, Oral, Q AM  OLANZapine, 10 mg, Oral, BID  pantoprazole, 40 mg, Intravenous, Q AM  rOPINIRole, 0.75 mg, Oral, Nightly  sertraline, 100 mg, Oral, Daily  sodium chloride, 10 mL, Intravenous, Q12H  tamsulosin, 0.4 mg, Oral, Daily  Vancomycin Pharmacy Intermittent Dosing, , Does not apply, Daily      Continuous Infusions:dexmedetomidine, 0.2-1.5 mcg/kg/hr, Last Rate: 0.5 mcg/kg/hr (22 1159)  niCARdipine, 5-15 mg/hr, Last Rate: Stopped (22 1030)  Pharmacy Consult,   Pharmacy to dose vancomycin,         Vital signs in last 24 hours:  Temp:  [97.8 °F (36.6 °C)-100 °F (37.8 °C)] 97.8 °F (36.6 °C)  Heart Rate:  [66-79] 67  Resp:  [16-21] 16  BP: ()/(56-94) 132/90    Intake/Output:    Intake/Output Summary (Last 24 hours) at 5/3/2022 1239  Last data filed at 5/3/2022 1200  Gross per 24 hour   Intake 1820 ml   Output 10 ml   Net 1810 ml       Exam:  /90   Pulse 67   Temp 97.8 °F (36.6 °C) (Oral)   Resp 16   Ht 157.5 cm (62.01\")   Wt 59.9 kg (132 lb 0.9 oz)   SpO2 94%   BMI 24.15 kg/m²     General Appearance:    Alert, cooperative, no distress   Head:    Normocephalic, without obvious abnormality, atraumatic   Eyes:       Throat:   Lips, tongue, gums normal   Neck:   Supple, symmetrical, trachea midline, no JVD   Lungs:     Clear to auscultation bilaterally, respirations unlabored   Chest Wall:    No tenderness " or deformity    Heart:    Regular rate and rhythm, S1 and S2 normal, no murmur,no  Rub or gallop   Abdomen:     Soft, nontender, bowel sounds active, no masses, no organomegaly    Extremities:   Extremities normal, atraumatic, no cyanosis or edema   Pulses:      Skin:   Skin is warm and dry,  no rashes or palpable lesions   Neurologic:  She is weak from stroke and not talking much.  confused      Lab Results (last 72 hours)     Procedure Component Value Units Date/Time    POC Glucose Once [[id number]]  (Normal) Collected: 04/30/22 1308    Specimen: Blood Updated: 04/30/22 1309     Glucose 80 mg/dL      Comment: Meter: [id number] : [id number] Chon Fernández RN       Blood Culture - Blood, Hand, Left [[id number]]  (Normal) Collected: 04/28/22 1113    Specimen: Blood from Hand, Left Updated: 04/30/22 1131     Blood Culture No growth at 2 days    Blood Culture - Blood, Arm, Right [[id number]]  (Normal) Collected: 04/28/22 1102    Specimen: Blood from Arm, Right Updated: 04/30/22 1130     Blood Culture No growth at 2 days    POC Glucose Once [[id number]]  (Normal) Collected: 04/30/22 0622    Specimen: Blood Updated: 04/30/22 0623     Glucose 87 mg/dL      Comment: Meter: [id number] : [id number] Phaniam Garcia NA       Catheter Culture - Cath Tip, Neck [[id number]]  (Abnormal)  (Susceptibility) Collected: 04/28/22 1438    Specimen: Cath Tip from Neck Updated: 04/30/22 0619     CATHETER CULTURE >15 CFU/mL - Indicative of infection. Staphylococcus aureus, MRSA     Comment: Methicillin resistant Staphylococcus aureus, Patient may be an isolation risk.       Susceptibility      Staphylococcus aureus, MRSA      ARSENIO      Gentamicin Susceptible      Oxacillin Resistant     Rifampin Susceptible      Vancomycin Susceptible                    Linear View               Susceptibility Comments     Staphylococcus aureus, MRSA    This isolate does not demonstrate inducible clindamycin resistance in vitro.               Renal Function  Panel [[id number]]  (Abnormal) Collected: 04/30/22 0327    Specimen: Blood Updated: 04/30/22 0415     Glucose 94 mg/dL      BUN 36 mg/dL      Creatinine 4.89 mg/dL      Sodium 130 mmol/L      Potassium 4.9 mmol/L      Chloride 93 mmol/L      CO2 22.0 mmol/L      Calcium 8.0 mg/dL      Albumin 3.20 g/dL      Phosphorus 3.8 mg/dL      Anion Gap 15.0 mmol/L      BUN/Creatinine Ratio 7.4     eGFR 9.9 mL/min/1.73      Comment: <15 Indicative of kidney failure       Narrative:      GFR Normal >60  Chronic Kidney Disease <60  Kidney Failure <15      Vancomycin, Random [[id number]]  (Normal) Collected: 04/30/22 0327    Specimen: Blood Updated: 04/30/22 0415     Vancomycin Random 12.70 mcg/mL     Narrative:      Therapeutic Ranges for Vancomycin    Vancomycin Random   5.0-40.0 mcg/mL  Vancomycin Trough   5.0-20.0 mcg/mL  Vancomycin Peak     20.0-40.0 mcg/mL    Lipid Panel [[id number]] Collected: 04/30/22 0327    Specimen: Blood Updated: 04/30/22 0415     Total Cholesterol 145 mg/dL      Triglycerides 119 mg/dL      HDL Cholesterol 44 mg/dL      LDL Cholesterol  80 mg/dL      VLDL Cholesterol 21 mg/dL      LDL/HDL Ratio 1.75    Narrative:      Cholesterol Reference Ranges  (U.S. Department of Health and Human Services ATP III Classifications)    Desirable          <200 mg/dL  Borderline High    200-239 mg/dL  High Risk          >240 mg/dL      Triglyceride Reference Ranges  (U.S. Department of Health and Human Services ATP III Classifications)    Normal           <150 mg/dL  Borderline High  150-199 mg/dL  High             200-499 mg/dL  Very High        >500 mg/dL    HDL Reference Ranges  (U.S. Department of Health and Human Services ATP III Classifications)    Low     <40 mg/dl (major risk factor for CHD)  High    >60 mg/dl ('negative' risk factor for CHD)        LDL Reference Ranges  (U.S. Department of Health and Human Services ATP III Classifications)    Optimal          <100 mg/dL  Near Optimal     100-129  mg/dL  Borderline High  130-159 mg/dL  High             160-189 mg/dL  Very High        >189 mg/dL    Fibrinogen [[id number]]  (Abnormal) Collected: 04/30/22 0327    Specimen: Blood Updated: 04/30/22 0412     Fibrinogen 494 mg/dL     POC Glucose Once [[id number]]  (Abnormal) Collected: 04/30/22 0018    Specimen: Blood Updated: 04/30/22 0019     Glucose 133 mg/dL      Comment: Meter: [id number] : [id number] Emilie SAAVEDRA       Fibrinogen [[id number]]  (Normal) Collected: 04/29/22 2317    Specimen: Blood Updated: 04/29/22 2339     Fibrinogen 381 mg/dL     Protime-INR [[id number]]  (Normal) Collected: 04/29/22 2317    Specimen: Blood Updated: 04/29/22 2339     Protime 12.8 Seconds      INR 0.97    aPTT [[id number]]  (Normal) Collected: 04/29/22 2317    Specimen: Blood Updated: 04/29/22 2339     PTT 29.4 seconds     CBC & Differential [[id number]]  (Abnormal) Collected: 04/29/22 2317    Specimen: Blood Updated: 04/29/22 2325    Narrative:      The following orders were created for panel order CBC & Differential.  Procedure                               Abnormality         Status                     ---------                               -----------         ------                     CBC Auto Differential[[id number]]        Abnormal            Final result                 Please view results for these tests on the individual orders.    CBC Auto Differential [[id number]]  (Abnormal) Collected: 04/29/22 2317    Specimen: Blood Updated: 04/29/22 2325     WBC 17.03 10*3/mm3      RBC 3.88 10*6/mm3      Hemoglobin 11.0 g/dL      Hematocrit 31.9 %      MCV 82.2 fL      MCH 28.4 pg      MCHC 34.5 g/dL      RDW 18.4 %      RDW-SD 54.2 fl      MPV 10.4 fL      Platelets 150 10*3/mm3      Neutrophil % 86.7 %      Lymphocyte % 5.9 %      Monocyte % 6.3 %      Eosinophil % 0.2 %      Basophil % 0.3 %      Immature Grans % 0.6 %      Neutrophils, Absolute 14.78 10*3/mm3      Lymphocytes, Absolute 1.00 10*3/mm3      Monocytes,  Absolute 1.07 10*3/mm3      Eosinophils, Absolute 0.03 10*3/mm3      Basophils, Absolute 0.05 10*3/mm3      Immature Grans, Absolute 0.10 10*3/mm3      nRBC 0.0 /100 WBC     POC Glucose Once [[id number]]  (Normal) Collected: 04/29/22 2108    Specimen: Blood Updated: 04/29/22 2109     Glucose 79 mg/dL      Comment: Meter: [id number] : [id number] Karon Cervantes RN       POC Glucose Once [[id number]]  (Normal) Collected: 04/29/22 1641    Specimen: Blood Updated: 04/29/22 1642     Glucose 72 mg/dL      Comment: Meter: [id number] : [id number] Tyron Ornelas RN       POC Glucose Once [[id number]]  (Normal) Collected: 04/29/22 1525    Specimen: Blood Updated: 04/29/22 1527     Glucose 74 mg/dL      Comment: Meter: [id number] : [id number] Tiago Navarro CNA       POC Glucose Once [[id number]]  (Abnormal) Collected: 04/29/22 1109    Specimen: Blood Updated: 04/29/22 1110     Glucose 170 mg/dL      Comment: Meter: [id number] : [id number] Tiago Navarro CNA       Urine Culture - Urine, Urine, Catheter [[id number]]  (Abnormal) Collected: 04/27/22 1712    Specimen: Urine, Catheter Updated: 04/29/22 0946     Urine Culture <10,000 CFU/mL Staphylococcus aureus, MRSA     Comment: Call if further workup needed.    Methicillin resistant Staph aureus, patient may be an isolation risk.  Staphylococcus aureus is not typically regarded as a uropathogen, except in cases with genitourinary instrumentation present.  It's presence suggests an alternate source (e.g. bloodstream, abscess, SSTI, etc.).  Please evaluate accordingly.       POC Glucose Once [[id number]]  (Normal) Collected: 04/29/22 0607    Specimen: Blood Updated: 04/29/22 0609     Glucose 129 mg/dL      Comment: Meter: [id number] : [id number] Hakeem Santacruz RN       Renal Function Panel [[id number]]  (Abnormal) Collected: 04/29/22 0332    Specimen: Blood Updated: 04/29/22 0445     Glucose 179 mg/dL      BUN 32 mg/dL      Creatinine 4.12 mg/dL      Sodium 132 mmol/L       Potassium 4.3 mmol/L      Chloride 95 mmol/L      CO2 26.2 mmol/L      Calcium 8.4 mg/dL      Albumin 2.90 g/dL      Phosphorus 3.7 mg/dL      Anion Gap 10.8 mmol/L      BUN/Creatinine Ratio 7.8     eGFR 12.1 mL/min/1.73      Comment: <15 Indicative of kidney failure       Narrative:      GFR Normal >60  Chronic Kidney Disease <60  Kidney Failure <15      CBC & Differential [[id number]]  (Abnormal) Collected: 04/29/22 0332    Specimen: Blood Updated: 04/29/22 0353    Narrative:      The following orders were created for panel order CBC & Differential.  Procedure                               Abnormality         Status                     ---------                               -----------         ------                     CBC Auto Differential[[id number]]        Abnormal            Final result                 Please view results for these tests on the individual orders.    CBC Auto Differential [[id number]]  (Abnormal) Collected: 04/29/22 0332    Specimen: Blood Updated: 04/29/22 0353     WBC 14.34 10*3/mm3      RBC 3.83 10*6/mm3      Hemoglobin 10.5 g/dL      Hematocrit 31.9 %      MCV 83.3 fL      MCH 27.4 pg      MCHC 32.9 g/dL      RDW 18.5 %      RDW-SD 55.5 fl      MPV 10.1 fL      Platelets 128 10*3/mm3      Neutrophil % 78.2 %      Lymphocyte % 12.0 %      Monocyte % 8.9 %      Eosinophil % 0.3 %      Basophil % 0.2 %      Neutrophils, Absolute 11.22 10*3/mm3      Lymphocytes, Absolute 1.72 10*3/mm3      Monocytes, Absolute 1.27 10*3/mm3      Eosinophils, Absolute 0.04 10*3/mm3      Basophils, Absolute 0.03 10*3/mm3     POC Glucose Once [[id number]]  (Abnormal) Collected: 04/28/22 2020    Specimen: Blood Updated: 04/28/22 2022     Glucose 242 mg/dL      Comment: Meter: [id number] : [id number] Cedric MCNULTY       POC Glucose Once [[id number]]  (Abnormal) Collected: 04/28/22 1612    Specimen: Blood Updated: 04/28/22 1614     Glucose 135 mg/dL      Comment: Meter: [id number] : [id number] Domo Crawford  "NA       Procalcitonin [[id number]]  (Normal) Collected: 04/28/22 0434    Specimen: Blood Updated: 04/28/22 1336     Procalcitonin 0.24 ng/mL     Narrative:      As a Marker for Sepsis (Non-Neonates):    1. <0.5 ng/mL represents a low risk of severe sepsis and/or septic shock.  2. >2 ng/mL represents a high risk of severe sepsis and/or septic shock.    As a Marker for Lower Respiratory Tract Infections that require antibiotic therapy:    PCT on Admission    Antibiotic Therapy       6-12 Hrs later    >0.5                Strongly Recommended  >0.25 - <0.5        Recommended   0.1 - 0.25          Discouraged              Remeasure/reassess PCT  <0.1                Strongly Discouraged     Remeasure/reassess PCT    As 28 day mortality risk marker: \"Change in Procalcitonin Result\" (>80% or <=80%) if Day 0 (or Day 1) and Day 4 values are available. Refer to [email or web address]    Change in PCT <=80%  A decrease of PCT levels below or equal to 80% defines a positive change in PCT test result representing a higher risk for 28-day all-cause mortality of patients diagnosed with severe sepsis for septic shock.    Change in PCT >80%  A decrease of PCT levels of more than 80% defines a negative change in PCT result representing a lower risk for 28-day all-cause mortality of patients diagnosed with severe sepsis or septic shock.       Hemoglobin A1c [[id number]]  (Abnormal) Collected: 04/28/22 1103    Specimen: Blood Updated: 04/28/22 1148     Hemoglobin A1C 5.80 %     Narrative:      Hemoglobin A1C Ranges:    Increased Risk for Diabetes  5.7% to 6.4%  Diabetes                     >= 6.5%  Diabetic Goal                < 7.0%    POC Glucose Once [[id number]]  (Normal) Collected: 04/28/22 1124    Specimen: Blood Updated: 04/28/22 1127     Glucose 105 mg/dL      Comment: Meter: [id number] : [id number] Domo SAAVEDRA       POC Glucose Once [[id number]]  (Normal) Collected: 04/28/22 0638    Specimen: Blood Updated: " 04/28/22 0655     Glucose 113 mg/dL      Comment: Meter: [id number] : [id number] Nabil SAAVEDRA       Renal Function Panel [[id number]]  (Abnormal) Collected: 04/28/22 0434    Specimen: Blood Updated: 04/28/22 0549     Glucose 106 mg/dL      BUN 21 mg/dL      Creatinine 3.06 mg/dL      Sodium 135 mmol/L      Potassium 4.6 mmol/L      Chloride 97 mmol/L      CO2 24.5 mmol/L      Calcium 9.0 mg/dL      Albumin 3.40 g/dL      Phosphorus 2.6 mg/dL      Anion Gap 13.5 mmol/L      BUN/Creatinine Ratio 6.9     eGFR 17.3 mL/min/1.73      Comment: National Kidney Foundation and American Society of Nephrology (ASN) Task Force recommended calculation based on the Chronic Kidney Disease Epidemiology Collaboration (CKD-EPI) equation refit without adjustment for race.       Narrative:      GFR Normal >60  Chronic Kidney Disease <60  Kidney Failure <15      Magnesium [[id number]]  (Normal) Collected: 04/28/22 0434    Specimen: Blood Updated: 04/28/22 0535     Magnesium 1.7 mg/dL     CBC & Differential [[id number]]  (Abnormal) Collected: 04/28/22 0434    Specimen: Blood Updated: 04/28/22 0520    Narrative:      The following orders were created for panel order CBC & Differential.  Procedure                               Abnormality         Status                     ---------                               -----------         ------                     CBC Auto Differential[[id number]]        Abnormal            Final result                 Please view results for these tests on the individual orders.    CBC Auto Differential [[id number]]  (Abnormal) Collected: 04/28/22 0434    Specimen: Blood Updated: 04/28/22 0520     WBC 13.49 10*3/mm3      RBC 4.13 10*6/mm3      Hemoglobin 11.3 g/dL      Hematocrit 33.3 %      MCV 80.6 fL      MCH 27.4 pg      MCHC 33.9 g/dL      RDW 18.4 %      RDW-SD 53.4 fl      MPV 10.4 fL      Platelets 137 10*3/mm3      Neutrophil % 82.8 %      Lymphocyte % 8.6 %      Monocyte % 7.6 %      Eosinophil %  0.1 %      Basophil % 0.5 %      Neutrophils, Absolute 11.17 10*3/mm3      Lymphocytes, Absolute 1.16 10*3/mm3      Monocytes, Absolute 1.03 10*3/mm3      Eosinophils, Absolute 0.01 10*3/mm3      Basophils, Absolute 0.07 10*3/mm3     Urinalysis, Microscopic Only - Urine, Catheter [[id number]]  (Abnormal) Collected: 04/27/22 1712    Specimen: Urine, Catheter Updated: 04/27/22 2301     RBC, UA 6-12 /HPF      WBC, UA 31-50 /HPF      Bacteria, UA Trace /HPF      Squamous Epithelial Cells, UA 0-2 /HPF      Hyaline Casts, UA 3-6 /LPF      Methodology Manual Light Microscopy    Urinalysis With Culture If Indicated - Urine, Catheter [[id number]]  (Abnormal) Collected: 04/27/22 1712    Specimen: Urine, Catheter Updated: 04/27/22 2247     Color, UA Yellow     Appearance, UA Clear     pH, UA 8.0     Specific Gravity, UA 1.020     Glucose,  mg/dL (1+)     Ketones, UA Negative     Bilirubin, UA Negative     Blood, UA Small (1+)     Protein, UA >=300 mg/dL (3+)     Leuk Esterase, UA Small (1+)     Nitrite, UA Negative     Urobilinogen, UA 0.2 E.U./dL    COVID PRE-OP / PRE-PROCEDURE SCREENING ORDER (NO ISOLATION) - Swab, Nasopharynx [[id number]]  (Normal) Collected: 04/27/22 1711    Specimen: Swab from Nasopharynx Updated: 04/27/22 1810    Narrative:      The following orders were created for panel order COVID PRE-OP / PRE-PROCEDURE SCREENING ORDER (NO ISOLATION) - Swab, Nasopharynx.  Procedure                               Abnormality         Status                     ---------                               -----------         ------                     COVID-19,[facility] IN-HOUSE...[[id number]]  Normal              Final result                 Please view results for these tests on the individual orders.    COVID-19,[facility] IN-HOUSE CEPHEID/MARTY NP SWAB IN TRANSPORT MEDIA 8-12 HR TAT - Swab, Nasopharynx [[id number]]  (Normal) Collected: 04/27/22 1711    Specimen: Swab from Nasopharynx Updated: 04/27/22 1810     COVID19 Not  Detected    Narrative:      Fact sheet for providers: https://www.fda.gov/media/265668/download     Fact sheet for patients: https://www.fda.gov/media/437045/download    Urine Drug Screen - Urine, Clean Catch [[id number]]  (Abnormal) Collected: 04/27/22 1712    Specimen: Urine, Clean Catch Updated: 04/27/22 1804     Amphet/Methamphet, Screen Negative     Barbiturates Screen, Urine Negative     Benzodiazepine Screen, Urine Positive     Cocaine Screen, Urine Negative     Opiate Screen Negative     THC, Screen, Urine Negative     Methadone Screen, Urine Negative     Oxycodone Screen, Urine Positive    Narrative:      Negative Thresholds Per Drugs Screened:    Amphetamines                 500 ng/ml  Barbiturates                 200 ng/ml  Benzodiazepines              100 ng/ml  Cocaine                      300 ng/ml  Methadone                    300 ng/ml  Opiates                      300 ng/ml  Oxycodone                    100 ng/ml  THC                           50 ng/ml    The Normal Value for all drugs tested is negative. This report includes final unconfirmed screening results to be used for medical treatment purposes only. Unconfirmed results must not be used for non-medical purposes such as employment or legal testing. Clinical consideration should be applied to any drug of abuse test, particularly when unconfirmed results are used.            Comprehensive Metabolic Panel [[id number]]  (Abnormal) Collected: 04/27/22 1615    Specimen: Blood Updated: 04/27/22 1722     Glucose 178 mg/dL      BUN 57 mg/dL      Creatinine 5.37 mg/dL      Sodium 132 mmol/L      Potassium 6.7 mmol/L      Chloride 94 mmol/L      CO2 24.1 mmol/L      Calcium 9.0 mg/dL      Total Protein 6.9 g/dL      Albumin 3.80 g/dL      ALT (SGPT) 14 U/L      AST (SGOT) 30 U/L      Alkaline Phosphatase 140 U/L      Total Bilirubin 0.9 mg/dL      Globulin 3.1 gm/dL      A/G Ratio 1.2 g/dL      BUN/Creatinine Ratio 10.6     Anion Gap 13.9 mmol/L      eGFR  8.8 mL/min/1.73      Comment: <15 Indicative of kidney failure       Narrative:      GFR Normal >60  Chronic Kidney Disease <60  Kidney Failure <15      Blood Gas, Arterial - [[id number]]  (Abnormal) Collected: 04/27/22 1648    Specimen: Arterial Blood Updated: 04/27/22 1659     Site Arterial: right radial     Omar's Test Positive     pH, Arterial 7.395 pH units      pCO2, Arterial 44.0 mm Hg      pO2, Arterial 52.8 mm Hg      Comment: Meter: [id number] : [id number] Roshan NicoleCritical:Notify Dr dr madan farooq (27-Apr-22 16:53:55)Read back ok        HCO3, Arterial 27.0 mmol/L      Base Excess, Arterial 1.7 mmol/L      O2 Saturation Calculated 86.6 %      A-a Gradiant 0.5 mmHg      Barometric Pressure for Blood Gas 759.5 mmHg      Modality Room Air     FIO2 21 %      Rate 18 Breaths/minute     Ethanol [[id number]] Collected: 04/27/22 1615    Specimen: Blood Updated: 04/27/22 1657     Ethanol <10 mg/dL      Ethanol % <0.010 %         Data Review:  Results from last 7 days   Lab Units 05/03/22  0340 05/02/22  0329 05/01/22  1050   SODIUM mmol/L 129* 130* 132*   POTASSIUM mmol/L 4.6 3.9 4.6   CHLORIDE mmol/L 96* 95* 96*   CO2 mmol/L 21.0* 22.3 21.3*   BUN mg/dL 39* 26* 41*   CREATININE mg/dL 5.04* 4.23* 6.22*   GLUCOSE mg/dL 183* 119* 99   CALCIUM mg/dL 7.9* 7.9* 8.0*     Results from last 7 days   Lab Units 05/03/22  0340 05/02/22  0329 05/01/22  1050   WBC 10*3/mm3 16.34* 10.55 12.18*   HEMOGLOBIN g/dL 10.0* 10.8* 10.1*   HEMATOCRIT % 30.7* 32.3* 29.6*   PLATELETS 10*3/mm3 193 203 172         Results from last 7 days   Lab Units 04/28/22  1103   HEMOGLOBIN A1C % 5.80*     Lab Results   Lab Value Date/Time    TROPONINT 0.092 (C) 12/19/2021 1314    TROPONINT 0.117 (C) 11/30/2021 0153    TROPONINT 0.132 (C) 11/29/2021 2121     Results from last 7 days   Lab Units 04/30/22  0327   CHOLESTEROL mg/dL 145   TRIGLYCERIDES mg/dL 119   HDL CHOL mg/dL 44   LDL CHOL mg/dL 80     Results from last 7 days   Lab Units  04/27/22  1615   ALK PHOS U/L 140*   BILIRUBIN mg/dL 0.9   ALT (SGPT) U/L 14   AST (SGOT) U/L 30         Results from last 7 days   Lab Units 04/28/22  1103   HEMOGLOBIN A1C % 5.80*     Glucose   Date/Time Value Ref Range Status   05/03/2022 1127 186 (H) 70 - 130 mg/dL Final     Comment:     Meter: [id number] : [id number] Jacob Moss RN   05/03/2022 0757 205 (H) 70 - 130 mg/dL Final     Comment:     Meter: [id number] : [id number] Jacob Moss RN   05/02/2022 1941 195 (H) 70 - 130 mg/dL Final     Comment:     Meter: [id number] : [id number] Sadiq SAAVEDRA   05/02/2022 1733 145 (H) 70 - 130 mg/dL Final     Comment:     Meter: [id number] : [id number] Jacob Moss RN   05/02/2022 1144 216 (H) 70 - 130 mg/dL Final     Comment:     Meter: [id number] : [id number] Jacob Moss RN   05/02/2022 0753 223 (H) 70 - 130 mg/dL Final     Comment:     Meter: [id number] : [id number] Jacob Moss RN   05/02/2022 0616 172 (H) 70 - 130 mg/dL Final     Comment:     Meter: [id number] : [id number] Karon Cervantes RN   05/02/2022 0010 172 (H) 70 - 130 mg/dL Final     Comment:     Meter: [id number] : [id number] Karon Cervantes RN     Results from last 7 days   Lab Units 04/29/22  2317 04/27/22  1615   INR  0.97 1.02       Past Medical History:   Diagnosis Date   • Acute CVA (cerebrovascular accident) (HCC) 5/1/2022   • Acute on chronic diastolic CHF (congestive heart failure) (HCC)    • CAD (coronary artery disease) 12/20/2021   • Diabetes (HCC)    • Disease of thyroid gland    • GERD (gastroesophageal reflux disease)    • Hyperlipidemia 11/30/2018   • Hypertension    • Rheumatoid arthritis (HCC)        Assessment:  Active Hospital Problems    Diagnosis  POA   • **Encephalopathy, toxic [G92.9]  Unknown   • Acute CVA (cerebrovascular accident) (HCC) [I63.9]  Unknown   • Intracranial hemorrhage (HCC) [I62.9]  Unknown   • Acute metabolic encephalopathy [G93.41]  Yes   • Leukocytosis [D72.829]  Yes    • Acute on chronic diastolic CHF (congestive heart failure) ([facility]) [I50.33]  Yes   • CAD (coronary artery disease) [I25.10]  Yes   • ESRD (end stage renal disease) ([facility]) [N18.6]  Yes   • Hypertensive disorder [I10]  Yes   • Acute DM type 2 causing complication ([facility]) [E11.8]  Yes      Resolved Hospital Problems   No resolved problems to display.       Plan:  Continue with current medication.  ICU support.  As per multiple consultants.  Follow-up lab. HD per renal.  Requiring restraints.    Aris Isbell MD  5/3/2022  12:39 EDT

## 2022-05-03 NOTE — THERAPY TREATMENT NOTE
Patient Name: Zaina Martinez  : 1965    MRN: [id number]                              Today's Date: 5/3/2022       Admit Date: 2022    Visit Dx:     ICD-10-CM ICD-9-CM   1. Acute metabolic encephalopathy  G93.41 348.31   2. ESRD (end stage renal disease) on dialysis (Formerly [facility])  N18.6 585.6    Z99.2 V45.11   3. Acute hypoxemic respiratory failure (Formerly [facility])  J96.01 518.81   4. Hyperkalemia  E87.5 276.7   5. ESRD (end stage renal disease) (Formerly [facility])  N18.6 585.6     Patient Active Problem List   Diagnosis   • Renal insufficiency   • Hypertensive disorder   • Hypothyroidism   • Acute DM type 2 causing complication (Formerly [facility])   • Rheumatoid arthritis (Formerly [facility])   • Angioedema   • Esophageal dysmotility   • Anemia   • Medically noncompliant   • Myocardial infarction due to demand ischemia (Former[facility])   • Enteritis   • PRES (posterior reversible encephalopathy syndrome)   • Urine retention   • Klebsiella infection   • Superficial thrombophlebitis   • Generalized weakness   • ESRD (end stage renal disease) (Formerly [facility])   • CAD (coronary artery disease)   • Abnormal urinalysis   • Acute on chronic diastolic CHF (congestive heart failure) (Former[facility])   • Pyelonephritis   • Calculus of gallbladder with acute on chronic cholecystitis without obstruction   • Pleural effusion on right   • Anemia due to chronic kidney disease, on chronic dialysis ([facility])   • Abnormal findings on diagnostic imaging of other specified body structures   • Acute upper respiratory infection   • Agitation   • Alkaline phosphatase raised   • Casts present in urine   • Cellulitis of toe   • Hip pain   • Community acquired pneumonia   • Depressive disorder   • Diarrhea of presumed infectious origin   • Difficult or painful urination   • Disease due to severe acute respiratory syndrome coronavirus 2 (SARS-CoV-2)   • Dyspnea   • Encounter for follow-up examination after completed treatment for conditions other than malignant neoplasm   • H/O: hypothyroidism   • Hyperlipidemia   •  Hypomagnesemia   • Intractable vomiting with nausea   • Leukocytosis   • Luetscher's syndrome   • Need for influenza vaccination   • Restless legs   • Noncompliance with treatment   • Shoulder pain   • Urinary tract infectious disease   • Metabolic encephalopathy   • Abnormal findings on diagnostic imaging of abdomen   • Status post cholecystectomy   • Hyponatremia   • Leukocytosis   • Acute metabolic encephalopathy   • Encephalopathy, toxic   • Acute CVA (cerebrovascular accident) (HCC)   • Intracranial hemorrhage (HCC)     Past Medical History:   Diagnosis Date   • Acute CVA (cerebrovascular accident) (HCC) 5/1/2022   • Acute on chronic diastolic CHF (congestive heart failure) (HCC)    • CAD (coronary artery disease) 12/20/2021   • Diabetes (HCC)    • Disease of thyroid gland    • GERD (gastroesophageal reflux disease)    • Hyperlipidemia 11/30/2018   • Hypertension    • Rheumatoid arthritis (HCC)      Past Surgical History:   Procedure Laterality Date   • CHOLECYSTECTOMY WITH INTRAOPERATIVE CHOLANGIOGRAM N/A 1/10/2022    Procedure: Laparoscopic cholecystectomy with intraoperative cholangiogram;  Surgeon: Ramana Raygoza MD;  Location: [facility];  Service: General;  Laterality: N/A;   • EYE SURGERY     • HYSTERECTOMY     • INSERTION HEMODIALYSIS CATHETER N/A 12/6/2021    Procedure: HEMODIALYSIS CATHETER INSERTION;  Surgeon: Keli Salazar MD;  Location: [facility] 18/19;  Service: Vascular;  Laterality: N/A;      General Information     Row Name 05/03/22 1517          OT Time and Intention    Document Type evaluation  -SM     Mode of Treatment occupational therapy;individual therapy  -     Row Name 05/03/22 1517          General Information    Patient Profile Reviewed yes  -SM     Existing Precautions/Restrictions fall  -     Row Name 05/03/22 1517          Cognition    Orientation Status (Cognition) oriented to;person  at times pt with word salad, some fluid conversation but limited as pt  "is very confused. Follows some commands >25% accuracy  -     Row Name 05/03/22 1517          Safety Issues, Functional Mobility    Safety Issues Affecting Function (Mobility) ability to follow commands;insight into deficits/self-awareness;awareness of need for assistance;safety precaution awareness;sequencing abilities;judgment;problem-solving;at risk behavior observed;impulsivity  -     Impairments Affecting Function (Mobility) balance;cognition;endurance/activity tolerance;postural/trunk control;strength;coordination;grasp;pain  -           User Key  (r) = Recorded By, (t) = Taken By, (c) = Cosigned By    Initials Name Provider Type     Tesha Charles OT Occupational Therapist                 Mobility/ADL's     Row Name 05/03/22 1518          Bed Mobility    Supine-Sit Harris (Bed Mobility) maximum assist (25% patient effort);2 person assist;verbal cues;nonverbal cues (demo/gesture)  -     Sit-Supine Harris (Bed Mobility) maximum assist (25% patient effort);2 person assist;verbal cues;nonverbal cues (demo/gesture)  -     Row Name 05/03/22 1518          Transfers    Comment, (Transfers) not safe/appropriate today to attempt.  -     Row Name 05/03/22 1518          Activities of Daily Living    BADL Assessment/Intervention --  states \"no\" to all ADLs.  -           User Key  (r) = Recorded By, (t) = Taken By, (c) = Cosigned By    Initials Name Provider Type     Tesha Charles OT Occupational Therapist               Obj/Interventions     Row Name 05/03/22 1519          Motor Skills    Therapeutic Exercise --  does not allow OT to touch UEs, states \"no\"  -     Row Name 05/03/22 1519          Balance    Static Sitting Balance maximum assist  -     Comment, Balance sat EOB 5 minutes, pt pushing severally into extension and not following cues for trunk flexion forward. Max A for sitting balance throughout.  -           User Key  (r) = Recorded By, (t) = Taken By, (c) = Cosigned " "By    Initials Name Provider Type     Tesha Charles, OT Occupational Therapist               Goals/Plan    No documentation.                Clinical Impression     Row Name 05/03/22 1520          Pain Assessment    Pre/Posttreatment Pain Comment c/o of pain to touch with L wrist. RN notifed.  -     Row Name 05/03/22 1520          Pain Scale: FACES Pre/Post-Treatment    Pain: FACES Scale, Pretreatment 4-->hurts little more  -     Row Name 05/03/22 1520          Plan of Care Review    Plan of Care Reviewed With patient  -     Outcome Evaluation OT saw pt this date in ICU, she is very confused. Often talking to people that are not present and states \"stop talking I am on the phone\" but she is not. She is more conversational today, some nonsensical speech/word salad when answering some questions but poorly following commands. Pt sat EOB this date with max AX2 for bed mobility and max A for sitting balance as she is pushing heavy into trunk extension. OT attempted to engage in ADLs but pt stating \"no\" or poorly following commands to participate. She was admitted with L sided weakness but does not tolerate OT assisting with touch/ROM to BUE. Will continue to progress as tolerated. PRATEEK recommending at this time based on current status.  -     Row Name 05/03/22 1520          Therapy Assessment/Plan (OT)    Rehab Potential (OT) good, to achieve stated therapy goals  -     Criteria for Skilled Therapeutic Interventions Met (OT) yes;skilled treatment is necessary  -     Therapy Frequency (OT) 5 times/wk  -     Row Name 05/03/22 1520          Therapy Plan Review/Discharge Plan (OT)    Anticipated Discharge Disposition (OT) inpatient rehabilitation facility;sub acute care setting  -[facility] Name 05/03/22 1520          Positioning and Restraints    Pre-Treatment Position in bed  -     Post Treatment Position bed  -SM     In Bed fowlers;call light within reach;encouraged to call for assist;exit alarm " on;notified nsg  -SM     Restraints reapplied:;soft limb  -SM           User Key  (r) = Recorded By, (t) = Taken By, (c) = Cosigned By    Initials Name Provider Type    Tesha Ardon OT Occupational Therapist               Outcome Measures     Row Name 05/03/22 1524          How much help from another is currently needed...    Putting on and taking off regular lower body clothing? 1  -SM     Bathing (including washing, rinsing, and drying) 1  -SM     Toileting (which includes using toilet bed pan or urinal) 1  -SM     Putting on and taking off regular upper body clothing 1  -SM     Taking care of personal grooming (such as brushing teeth) 1  -SM     Eating meals 1  -SM     AM-PAC 6 Clicks Score (OT) 6  -SM     Row Name 05/03/22 1524          Functional Assessment    Outcome Measure Options AM-PAC 6 Clicks Daily Activity (OT)  -SM           User Key  (r) = Recorded By, (t) = Taken By, (c) = Cosigned By    Initials Name Provider Type    Tesha Ardon OT Occupational Therapist                Occupational Therapy Education                 Title: PT OT SLP Therapies (In Progress)     Topic: Occupational Therapy (Not Started)     Point: ADL training (Not Started)     Description:   Instruct learner(s) on proper safety adaptation and remediation techniques during self care or transfers.   Instruct in proper use of assistive devices.              Learner Progress:  Not documented in this visit.          Point: Home exercise program (Not Started)     Description:   Instruct learner(s) on appropriate technique for monitoring, assisting and/or progressing therapeutic exercises/activities.              Learner Progress:  Not documented in this visit.          Point: Precautions (Not Started)     Description:   Instruct learner(s) on prescribed precautions during self-care and functional transfers.              Learner Progress:  Not documented in this visit.          Point: Body mechanics (Not Started)      "Description:   Instruct learner(s) on proper positioning and spine alignment during self-care, functional mobility activities and/or exercises.              Learner Progress:  Not documented in this visit.                          OT Recommendation and Plan  Planned Therapy Interventions (OT): activity tolerance training, adaptive equipment training, cognitive/visual perception retraining, BADL retraining, functional balance retraining, IADL retraining, neuromuscular control/coordination retraining, patient/caregiver education/training, occupation/activity based interventions, transfer/mobility retraining, ROM/therapeutic exercise, strengthening exercise, passive ROM/stretching  Therapy Frequency (OT): 5 times/wk  Plan of Care Review  Plan of Care Reviewed With: patient  Outcome Evaluation: OT saw pt this date in ICU, she is very confused. Often talking to people that are not present and states \"stop talking I am on the phone\" but she is not. She is more conversational today, some nonsensical speech/word salad when answering some questions but poorly following commands. Pt sat EOB this date with max AX2 for bed mobility and max A for sitting balance as she is pushing heavy into trunk extension. OT attempted to engage in ADLs but pt stating \"no\" or poorly following commands to participate. She was admitted with L sided weakness but does not tolerate OT assisting with touch/ROM to BUE. Will continue to progress as tolerated. PRATEEK recommending at this time based on current status.     Time Calculation:    Time Calculation- OT     Row Name 05/03/22 1525             Time Calculation- OT    OT Start Time 1337  -      OT Stop Time 1352  -      OT Time Calculation (min) 15 min  -SM      Total Timed Code Minutes- OT 15 minute(s)  -      OT Received On 05/03/22  -      OT - Next Appointment 05/04/22  -              Timed Charges    83028 - OT Therapeutic Activity Minutes 15  -SM              Total Minutes    Timed " Charges Total Minutes 15  -SM       Total Minutes 15  -SM            User Key  (r) = Recorded By, (t) = Taken By, (c) = Cosigned By    Initials Name Provider Type     Tesha Charles OT Occupational Therapist              Therapy Charges for Today     Code Description Service Date Service Provider Modifiers Qty    77340574725 HC OT THERAPEUTIC ACT EA 15 MIN 5/2/2022 Tesha Charles OT GO 1    61637053824 HC OT EVAL MOD COMPLEXITY 2 5/2/2022 Tesha Charles OT GO 1    [id number]  OT THERAPEUTIC ACT EA 15 MIN 5/3/2022 Tesha Charles OT GO 1               Tesha Charles OT  5/3/2022

## 2022-05-03 NOTE — PROGRESS NOTES
Name: Zaina Martinez ADMIT: 2022   : 1965  PCP: Karson Oreilly MD    MRN: [id number] LOS: 5 days   AGE/SEX: 56 y.o. female  ROOM: [address]    [place], Subsequent Hospital Care    56 y.o. female with infected tunneled dialysis catheter growing MRSA, subsequently negative blood cultures.  Now in ICU due to encephalopathy with left frontal ICH    Remains sedated due to encephalopathy.  Discussed with her daughter the bedside.    Scheduled Medications:   [START ON 5/3/2022] amLODIPine, 10 mg, Oral, Q24H  atorvastatin, 40 mg, Oral, Nightly  cloNIDine, 0.2 mg, Nasogastric, Q6H  heparin (porcine), 3,000 Units, Intracatheter, Once  hydrALAZINE, 10 mg, Oral, Q8H  insulin lispro, 0-7 Units, Subcutaneous, 4x Daily With Meals & Nightly  levothyroxine, 125 mcg, Oral, Q AM  OLANZapine, 5 mg, Oral, BID  pantoprazole, 40 mg, Intravenous, Q AM  rOPINIRole, 0.75 mg, Oral, Nightly  sertraline, 100 mg, Oral, Daily  sodium chloride, 10 mL, Intravenous, Q12H  tamsulosin, 0.4 mg, Oral, Daily  Vancomycin Pharmacy Intermittent Dosing, , Does not apply, Daily      Active Infusions:  dexmedetomidine, 0.2-1.5 mcg/kg/hr, Last Rate: 0.8 mcg/kg/hr (22 1743)  niCARdipine, 5-15 mg/hr, Last Rate: Stopped (22 1400)  Pharmacy Consult,   Pharmacy to dose vancomycin,       Vital Signs  Vital Signs Patient Vitals for the past 24 hrs:   BP Temp Temp src Pulse SpO2   22 161/76 100 °F (37.8 °C) Oral 78 --   22 1900 155/76 -- -- 66 93 %   22 1800 158/75 -- -- 73 93 %   22 1700 120/66 -- -- 70 95 %   22 1600 142/91 98.9 °F (37.2 °C) Oral 74 93 %   22 1500 126/78 -- -- 74 94 %   22 1400 103/62 -- -- 75 93 %   22 1300 120/64 -- -- 75 94 %   22 1200 137/67 -- -- 87 94 %   22 1100 139/74 -- -- 85 93 %   22 0945 143/76 -- -- 93 96 %   22 0915 126/63 -- -- 85 94 %   22 0800 141/82 99.1 °F (37.3 °C) Oral 96 96 %   22 0600 141/73 --  -- 85 94 %   05/02/22 0500 (!) 127/103 -- -- 75 94 %   05/02/22 0400 135/98 99.2 °F (37.3 °C) Oral 78 92 %   05/02/22 0300 135/54 -- -- 71 90 %   05/02/22 0200 119/60 -- -- 68 93 %   05/02/22 0100 124/67 -- -- 69 93 %   05/02/22 0000 129/75 98.2 °F (36.8 °C) Oral 70 92 %   05/01/22 2300 133/65 -- -- 65 91 %   05/01/22 2200 -- -- -- 72 94 %   05/01/22 2100 127/67 -- -- 56 95 %     I/O:  I/O last 3 completed shifts:  In: 2094.4 [I.V.:1183.4; Other:318; NG/GT:593]  Out: -   Physical Exam:    Physical Exam   Right IJV Shiley catheter in place, patient confused   CBC    Results from last 7 days   Lab Units 05/02/22  0329 05/01/22  1050 04/29/22  2317 04/29/22  0332 04/28/22  0434 04/27/22  1615   WBC 10*3/mm3 10.55 12.18* 17.03* 14.34* 13.49* 12.84*   HEMOGLOBIN g/dL 10.8* 10.1* 11.0* 10.5* 11.3* 11.5*   PLATELETS 10*3/mm3 203 172 150 128* 137* 132*     BMP   Results from last 7 days   Lab Units 05/02/22  0329 05/01/22  1050 04/30/22  0327 04/29/22  0332 04/28/22  0434 04/27/22  1615   SODIUM mmol/L 130* 132* 130* 132* 135* 132*   POTASSIUM mmol/L 3.9 4.6 4.9 4.3 4.6 6.7*   CHLORIDE mmol/L 95* 96* 93* 95* 97* 94*   CO2 mmol/L 22.3 21.3* 22.0 26.2 24.5 24.1   BUN mg/dL 26* 41* 36* 32* 21* 57*   CREATININE mg/dL 4.23* 6.22* 4.89* 4.12* 3.06* 5.37*   GLUCOSE mg/dL 119* 99 94 179* 106* 178*   MAGNESIUM mg/dL  --   --   --   --  1.7  --    PHOSPHORUS mg/dL 3.2  --  3.8 3.7 2.6  --      Coag   Results from last 7 days   Lab Units 04/29/22  2317 04/27/22  1615   INR  0.97 1.02   APTT seconds 29.4 28.2     Assessment/Plan   Assessment & Plan    Encephalopathy, toxic    Hypertensive disorder    Acute DM type 2 causing complication (HCC)    ESRD (end stage renal disease) (HCC)    CAD (coronary artery disease)    Acute on chronic diastolic CHF (congestive heart failure) (HCC)    Leukocytosis    Acute metabolic encephalopathy    Acute CVA (cerebrovascular accident) (HCC)    Intracranial hemorrhage (HCC)    56 y.o. female with ESRD  on hemodialysis who presented with infected tunneled catheter with MRSA growing from tip culture.  On antibiotics and subsequent blood cultures have been negative.  S/p Shiley catheter placement over the weekend    Plan for tunneled catheter placement in the OR tomorrow.  Discussed risks, benefits, and alternatives with her daughter at bedside and answered all of her questions    Discussed with ID and reviewed chart.  Blood cultures negative to date.  OK to proceed with tunneled catheter    Vein mapping for AV access ordered.  Discussed this with daughter as well, will need outpatient HD access creation once she recovers from her stroke    Personal protective equipment used for this patient encounter:  Patient wearing surgical mask []    Provider wearing a surgical mask [x]    Gloves []    Eye protection []    Face Shield []    Gown []    N 95 respirator or CAPR/PAPR []   Duration of interaction 15 mins    Keli Salazar MD  Vascular Surgery  [facility]  O: [phone]  F: [phone]      Please call my office with any question: [phone]    Active Hospital Problems    Diagnosis  POA   • **Encephalopathy, toxic [G92.9]  Unknown   • Acute CVA (cerebrovascular accident) (HCC) [I63.9]  Unknown   • Intracranial hemorrhage (HCC) [I62.9]  Unknown   • Acute metabolic encephalopathy [G93.41]  Yes   • Leukocytosis [D72.829]  Yes   • Acute on chronic diastolic CHF (congestive heart failure) ([facility]) [I50.33]  Yes   • CAD (coronary artery disease) [I25.10]  Yes   • ESRD (end stage renal disease) ([facility]) [N18.6]  Yes   • Hypertensive disorder [I10]  Yes   • Acute DM type 2 causing complication (HCC) [E11.8]  Yes      Resolved Hospital Problems   No resolved problems to display.

## 2022-05-04 ENCOUNTER — APPOINTMENT (OUTPATIENT)
Dept: CARDIOLOGY | Facility: HOSPITAL | Age: 57
End: 2022-05-04

## 2022-05-04 LAB
ALBUMIN SERPL-MCNC: 2.3 G/DL (ref 3.5–5.2)
ANION GAP SERPL CALCULATED.3IONS-SCNC: 14 MMOL/L (ref 5–15)
BH CV UPPER VENOUS LEFT BASILIC FOREARM COMPRESS: NORMAL
BH CV UPPER VENOUS LEFT BASILIC UPPER COMPRESS: NORMAL
BH CV UPPER VENOUS LEFT CEPHALIC FOREARM COMPRESS: NORMAL
BH CV UPPER VENOUS LEFT CEPHALIC UPPER COLOR: 1
BH CV UPPER VENOUS LEFT CEPHALIC UPPER COMPRESS: NORMAL
BH CV UPPER VENOUS LEFT CEPHALIC UPPER THROMBUS: NORMAL
BH CV UPPER VENOUS LEFT SUBCLAVIAN AUGMENT: NORMAL
BH CV UPPER VENOUS LEFT SUBCLAVIAN COMPRESS: NORMAL
BH CV UPPER VENOUS LEFT SUBCLAVIAN PHASIC: NORMAL
BH CV UPPER VENOUS LEFT SUBCLAVIAN SPONT: NORMAL
BH CV UPPER VENOUS RIGHT BASILIC FOREARM COMPRESS: NORMAL
BH CV UPPER VENOUS RIGHT BASILIC UPPER COMPRESS: NORMAL
BH CV UPPER VENOUS RIGHT CEPHALIC FOREARM COMPRESS: NORMAL
BH CV UPPER VENOUS RIGHT CEPHALIC UPPER COMPRESS: NORMAL
BH CV UPPER VENOUS RIGHT SUBCLAVIAN AUGMENT: NORMAL
BH CV UPPER VENOUS RIGHT SUBCLAVIAN COMPRESS: NORMAL
BH CV UPPER VENOUS RIGHT SUBCLAVIAN PHASIC: NORMAL
BH CV UPPER VENOUS RIGHT SUBCLAVIAN SPONT: NORMAL
BH CV VAS MEAS BASILIC ANTECUBITAL FOSSA LEFT: 0.25 CM
BH CV VAS MEAS BASILIC ANTECUBITAL FOSSA RIGHT: 0.32 CM
BH CV VAS MEAS BASILIC FOREARM LEFT - DIST: 0.06 CM
BH CV VAS MEAS BASILIC FOREARM LEFT - MID: 0.08 CM
BH CV VAS MEAS BASILIC FOREARM LEFT - PROX: 0.11 CM
BH CV VAS MEAS BASILIC FOREARM RIGHT - DIST: 0.22 CM
BH CV VAS MEAS BASILIC FOREARM RIGHT - MID: 0.19 CM
BH CV VAS MEAS BASILIC FOREARM RIGHT - PROX: 0.19 CM
BH CV VAS MEAS BASILIC UPPER ARM LEFT - DIST: 0.34 CM
BH CV VAS MEAS BASILIC UPPER ARM LEFT - MID: 0.38 CM
BH CV VAS MEAS BASILIC UPPER ARM LEFT - PROX: 0.38 CM
BH CV VAS MEAS BASILIC UPPER ARM RIGHT - DIST: 0.37 CM
BH CV VAS MEAS BASILIC UPPER ARM RIGHT - MID: 0.32 CM
BH CV VAS MEAS BASILIC UPPER ARM RIGHT - PROX: 0.39 CM
BH CV VAS MEAS CEPHALIC ANTECUBITAL FOSSA LEFT: 0.19 CM
BH CV VAS MEAS CEPHALIC ANTECUBITAL FOSSA RIGHT: 0.17 CM
BH CV VAS MEAS CEPHALIC FOREARM LEFT - DIST: 0.14 CM
BH CV VAS MEAS CEPHALIC FOREARM LEFT - MID: 0.16 CM
BH CV VAS MEAS CEPHALIC FOREARM LEFT - PROX: 0.14 CM
BH CV VAS MEAS CEPHALIC FOREARM RIGHT - DIST: 0.16 CM
BH CV VAS MEAS CEPHALIC FOREARM RIGHT - MID: 0.14 CM
BH CV VAS MEAS CEPHALIC FOREARM RIGHT - PROX: 0.14 CM
BH CV VAS MEAS CEPHALIC UPPER ARM LEFT - DIST: 0.23 CM
BH CV VAS MEAS CEPHALIC UPPER ARM LEFT - MID: 0.24 CM
BH CV VAS MEAS CEPHALIC UPPER ARM LEFT - PROX: 0.22 CM
BH CV VAS MEAS CEPHALIC UPPER ARM RIGHT - DIST: 0.32 CM
BH CV VAS MEAS CEPHALIC UPPER ARM RIGHT - MID: 0.27 CM
BH CV VAS MEAS CEPHALIC UPPER ARM RIGHT - PROX: 0.23 CM
BH CV VAS MEAS RADIAL UPPER ARM LEFT - DIST: 0.19 CM
BH CV VAS MEAS RADIAL UPPER ARM LEFT - MID: 0.18 CM
BH CV VAS MEAS RADIAL UPPER ARM LEFT - PROX: 0.23 CM
BH CV VAS MEAS RADIAL UPPER ARM RIGHT - DIST: 0.21 CM
BH CV VAS MEAS RADIAL UPPER ARM RIGHT - MID: 0.22 CM
BH CV VAS MEAS RADIAL UPPER ARM RIGHT - PROX: 0.27 CM
BUN SERPL-MCNC: 19 MG/DL (ref 6–20)
BUN/CREAT SERPL: 6.5 (ref 7–25)
CALCIUM SPEC-SCNC: 7.9 MG/DL (ref 8.6–10.5)
CHLORIDE SERPL-SCNC: 99 MMOL/L (ref 98–107)
CO2 SERPL-SCNC: 19 MMOL/L (ref 22–29)
CREAT SERPL-MCNC: 2.93 MG/DL (ref 0.57–1)
DEPRECATED RDW RBC AUTO: 55.1 FL (ref 37–54)
EGFRCR SERPLBLD CKD-EPI 2021: 18.2 ML/MIN/1.73
ERYTHROCYTE [DISTWIDTH] IN BLOOD BY AUTOMATED COUNT: 17.6 % (ref 12.3–15.4)
GLUCOSE BLDC GLUCOMTR-MCNC: 127 MG/DL (ref 70–130)
GLUCOSE BLDC GLUCOMTR-MCNC: 189 MG/DL (ref 70–130)
GLUCOSE BLDC GLUCOMTR-MCNC: 190 MG/DL (ref 70–130)
GLUCOSE BLDC GLUCOMTR-MCNC: 216 MG/DL (ref 70–130)
GLUCOSE BLDC GLUCOMTR-MCNC: 271 MG/DL (ref 70–130)
GLUCOSE SERPL-MCNC: 206 MG/DL (ref 65–99)
HCT VFR BLD AUTO: 29.8 % (ref 34–46.6)
HGB BLD-MCNC: 9.6 G/DL (ref 12–15.9)
MAXIMAL PREDICTED HEART RATE: 164 BPM
MCH RBC QN AUTO: 27.3 PG (ref 26.6–33)
MCHC RBC AUTO-ENTMCNC: 32.2 G/DL (ref 31.5–35.7)
MCV RBC AUTO: 84.7 FL (ref 79–97)
PHOSPHATE SERPL-MCNC: 2.6 MG/DL (ref 2.5–4.5)
PLATELET # BLD AUTO: 214 10*3/MM3 (ref 140–450)
PMV BLD AUTO: 11.6 FL (ref 6–12)
POTASSIUM SERPL-SCNC: 3.9 MMOL/L (ref 3.5–5.2)
PROCALCITONIN SERPL-MCNC: 0.99 NG/ML (ref 0–0.25)
RBC # BLD AUTO: 3.52 10*6/MM3 (ref 3.77–5.28)
SODIUM SERPL-SCNC: 132 MMOL/L (ref 136–145)
STRESS TARGET HR: 139 BPM
UPPER ARTERIAL LEFT ARM BRACHIAL LENGTH: 0.39 CM
UPPER ARTERIAL RIGHT ARM BRACHIAL LENGTH: 0.39 CM
VANCOMYCIN SERPL-MCNC: 21.7 MCG/ML (ref 5–40)
WBC NRBC COR # BLD: 17.72 10*3/MM3 (ref 3.4–10.8)

## 2022-05-04 PROCEDURE — 85027 COMPLETE CBC AUTOMATED: CPT | Performed by: STUDENT IN AN ORGANIZED HEALTH CARE EDUCATION/TRAINING PROGRAM

## 2022-05-04 PROCEDURE — 99232 SBSQ HOSP IP/OBS MODERATE 35: CPT | Performed by: STUDENT IN AN ORGANIZED HEALTH CARE EDUCATION/TRAINING PROGRAM

## 2022-05-04 PROCEDURE — 84145 PROCALCITONIN (PCT): CPT | Performed by: STUDENT IN AN ORGANIZED HEALTH CARE EDUCATION/TRAINING PROGRAM

## 2022-05-04 PROCEDURE — 97530 THERAPEUTIC ACTIVITIES: CPT

## 2022-05-04 PROCEDURE — 97535 SELF CARE MNGMENT TRAINING: CPT

## 2022-05-04 PROCEDURE — 93985 DUP-SCAN HEMO COMPL BI STD: CPT

## 2022-05-04 PROCEDURE — 80069 RENAL FUNCTION PANEL: CPT | Performed by: INTERNAL MEDICINE

## 2022-05-04 PROCEDURE — 99231 SBSQ HOSP IP/OBS SF/LOW 25: CPT | Performed by: INTERNAL MEDICINE

## 2022-05-04 PROCEDURE — 82962 GLUCOSE BLOOD TEST: CPT

## 2022-05-04 PROCEDURE — 80202 ASSAY OF VANCOMYCIN: CPT | Performed by: HOSPITALIST

## 2022-05-04 PROCEDURE — 63710000001 INSULIN LISPRO (HUMAN) PER 5 UNITS: Performed by: INTERNAL MEDICINE

## 2022-05-04 PROCEDURE — 92526 ORAL FUNCTION THERAPY: CPT

## 2022-05-04 PROCEDURE — 25010000002 VANCOMYCIN PER 500 MG: Performed by: HOSPITALIST

## 2022-05-04 RX ORDER — CLONIDINE HYDROCHLORIDE 0.1 MG/1
0.1 TABLET ORAL EVERY 12 HOURS
Status: DISCONTINUED | OUTPATIENT
Start: 2022-05-06 | End: 2022-05-05

## 2022-05-04 RX ORDER — CLONIDINE HYDROCHLORIDE 0.1 MG/1
0.1 TABLET ORAL EVERY 8 HOURS SCHEDULED
Status: DISCONTINUED | OUTPATIENT
Start: 2022-05-05 | End: 2022-05-05

## 2022-05-04 RX ORDER — CLONIDINE HYDROCHLORIDE 0.1 MG/1
0.1 TABLET ORAL EVERY 6 HOURS SCHEDULED
Status: DISCONTINUED | OUTPATIENT
Start: 2022-05-04 | End: 2022-05-05

## 2022-05-04 RX ORDER — CLONIDINE HYDROCHLORIDE 0.1 MG/1
0.1 TABLET ORAL ONCE
Status: DISCONTINUED | OUTPATIENT
Start: 2022-05-08 | End: 2022-05-05

## 2022-05-04 RX ADMIN — LEVOTHYROXINE SODIUM 125 MCG: 0.12 TABLET ORAL at 06:04

## 2022-05-04 RX ADMIN — ROPINIROLE HYDROCHLORIDE 0.75 MG: 0.5 TABLET, FILM COATED ORAL at 20:36

## 2022-05-04 RX ADMIN — HYDRALAZINE HYDROCHLORIDE 10 MG: 10 TABLET, FILM COATED ORAL at 23:43

## 2022-05-04 RX ADMIN — SERTRALINE 100 MG: 100 TABLET, FILM COATED ORAL at 08:04

## 2022-05-04 RX ADMIN — INSULIN LISPRO 8 UNITS: 100 INJECTION, SOLUTION INTRAVENOUS; SUBCUTANEOUS at 06:28

## 2022-05-04 RX ADMIN — NICARDIPINE HYDROCHLORIDE 2.5 MG/HR: 25 INJECTION, SOLUTION INTRAVENOUS at 03:46

## 2022-05-04 RX ADMIN — INSULIN LISPRO 3 UNITS: 100 INJECTION, SOLUTION INTRAVENOUS; SUBCUTANEOUS at 18:30

## 2022-05-04 RX ADMIN — OLANZAPINE 10 MG: 10 TABLET ORAL at 08:04

## 2022-05-04 RX ADMIN — CLONIDINE HYDROCHLORIDE 0.1 MG: 0.1 TABLET ORAL at 11:49

## 2022-05-04 RX ADMIN — CLONIDINE HYDROCHLORIDE 0.1 MG: 0.1 TABLET ORAL at 00:24

## 2022-05-04 RX ADMIN — ACETAMINOPHEN 650 MG: 325 TABLET, FILM COATED ORAL at 22:39

## 2022-05-04 RX ADMIN — INSULIN LISPRO 3 UNITS: 100 INJECTION, SOLUTION INTRAVENOUS; SUBCUTANEOUS at 00:24

## 2022-05-04 RX ADMIN — VANCOMYCIN HYDROCHLORIDE 500 MG: 500 INJECTION, POWDER, LYOPHILIZED, FOR SOLUTION INTRAVENOUS at 00:28

## 2022-05-04 RX ADMIN — ACETAMINOPHEN 650 MG: 325 TABLET, FILM COATED ORAL at 11:50

## 2022-05-04 RX ADMIN — Medication 10 ML: at 08:04

## 2022-05-04 RX ADMIN — HYDRALAZINE HYDROCHLORIDE 10 MG: 10 TABLET, FILM COATED ORAL at 16:23

## 2022-05-04 RX ADMIN — HYDRALAZINE HYDROCHLORIDE 10 MG: 10 TABLET, FILM COATED ORAL at 06:04

## 2022-05-04 RX ADMIN — CLONIDINE HYDROCHLORIDE 0.1 MG: 0.1 TABLET ORAL at 23:43

## 2022-05-04 RX ADMIN — CLONIDINE HYDROCHLORIDE 0.1 MG: 0.1 TABLET ORAL at 06:04

## 2022-05-04 RX ADMIN — OLANZAPINE 10 MG: 10 TABLET ORAL at 20:36

## 2022-05-04 RX ADMIN — INSULIN LISPRO 5 UNITS: 100 INJECTION, SOLUTION INTRAVENOUS; SUBCUTANEOUS at 11:56

## 2022-05-04 RX ADMIN — ATORVASTATIN CALCIUM 40 MG: 20 TABLET, FILM COATED ORAL at 20:36

## 2022-05-04 RX ADMIN — PANTOPRAZOLE SODIUM 40 MG: 40 INJECTION, POWDER, FOR SOLUTION INTRAVENOUS at 06:04

## 2022-05-04 RX ADMIN — AMLODIPINE BESYLATE 10 MG: 10 TABLET ORAL at 08:04

## 2022-05-04 NOTE — PLAN OF CARE
Goal Outcome Evaluation:  Plan of Care Reviewed With: patient        Progress: improving  Outcome Evaluation: Pt showing improvements today and able to follow commands better but remains impulsive. She also is able to state that she is in the hospital and the name of this hospital today. She was able to work with OT today on sitting balance, ADL engagement sitting EOB and sitting up in bed, and UE reaching. X1 sit to stand completed with max AX2. Pt requires assist with all ADLs at present 2/2 cogntion, postural control, UE coordination, strength, pain. Co treat completed with PT today. With showing improvements she may benefit from acute rehab if continued to progress with therapies.    OT wore a mask, eye protection, gloves, gown, and completed hand hygiene.

## 2022-05-04 NOTE — THERAPY TREATMENT NOTE
Patient Name: Zaina Martinez  : 1965    MRN: [id number]                              Today's Date: 2022       Admit Date: 2022    Visit Dx:     ICD-10-CM ICD-9-CM   1. Acute metabolic encephalopathy  G93.41 348.31   2. ESRD (end stage renal disease) on dialysis ([facility])  N18.6 585.6    Z99.2 V45.11   3. Acute hypoxemic respiratory failure ([facility])  J96.01 518.81   4. Hyperkalemia  E87.5 276.7   5. ESRD (end stage renal disease) ([facility])  N18.6 585.6     Patient Active Problem List   Diagnosis   • Renal insufficiency   • Hypertensive disorder   • Hypothyroidism   • Acute DM type 2 causing complication ([facility])   • Rheumatoid arthritis ([facility])   • Angioedema   • Esophageal dysmotility   • Anemia   • Medically noncompliant   • Myocardial infarction due to demand ischemia ([facility])   • Enteritis   • PRES (posterior reversible encephalopathy syndrome)   • Urine retention   • Klebsiella infection   • Superficial thrombophlebitis   • Generalized weakness   • ESRD (end stage renal disease) ([facility])   • CAD (coronary artery disease)   • Abnormal urinalysis   • Acute on chronic diastolic CHF (congestive heart failure) ([facility])   • Pyelonephritis   • Calculus of gallbladder with acute on chronic cholecystitis without obstruction   • Pleural effusion on right   • Anemia due to chronic kidney disease, on chronic dialysis ([facility])   • Abnormal findings on diagnostic imaging of other specified body structures   • Acute upper respiratory infection   • Agitation   • Alkaline phosphatase raised   • Casts present in urine   • Cellulitis of toe   • Hip pain   • Community acquired pneumonia   • Depressive disorder   • Diarrhea of presumed infectious origin   • Difficult or painful urination   • Disease due to severe acute respiratory syndrome coronavirus 2 (SARS-CoV-2)   • Dyspnea   • Encounter for follow-up examination after completed treatment for conditions other than malignant neoplasm   • H/O: hypothyroidism   • Hyperlipidemia   •  Hypomagnesemia   • Intractable vomiting with nausea   • Leukocytosis   • Luetscher's syndrome   • Need for influenza vaccination   • Restless legs   • Noncompliance with treatment   • Shoulder pain   • Urinary tract infectious disease   • Metabolic encephalopathy   • Abnormal findings on diagnostic imaging of abdomen   • Status post cholecystectomy   • Hyponatremia   • Leukocytosis   • Acute metabolic encephalopathy   • Encephalopathy, toxic   • Acute CVA (cerebrovascular accident) (HCC)   • Intracranial hemorrhage (HCC)     Past Medical History:   Diagnosis Date   • Acute CVA (cerebrovascular accident) (HCC) 5/1/2022   • Acute on chronic diastolic CHF (congestive heart failure) (HCC)    • CAD (coronary artery disease) 12/20/2021   • Diabetes (HCC)    • Disease of thyroid gland    • GERD (gastroesophageal reflux disease)    • Hyperlipidemia 11/30/2018   • Hypertension    • Rheumatoid arthritis (HCC)      Past Surgical History:   Procedure Laterality Date   • CHOLECYSTECTOMY WITH INTRAOPERATIVE CHOLANGIOGRAM N/A 1/10/2022    Procedure: Laparoscopic cholecystectomy with intraoperative cholangiogram;  Surgeon: Ramana Raygoza MD;  Location: [facility];  Service: General;  Laterality: N/A;   • EYE SURGERY     • HYSTERECTOMY     • INSERTION HEMODIALYSIS CATHETER N/A 12/6/2021    Procedure: HEMODIALYSIS CATHETER INSERTION;  Surgeon: Keli Salazar MD;  Location: [facility] 18/19;  Service: Vascular;  Laterality: N/A;   • INSERTION HEMODIALYSIS CATHETER N/A 5/3/2022    Procedure: TUNNELED CATHETER PLACEMENT;  Surgeon: Keli Salazar MD;  Location: [facility];  Service: Vascular;  Laterality: N/A;      General Information     Row Name 05/04/22 1315          Physical Therapy Time and Intention    Document Type therapy note (daily note) (P)   -KA     Mode of Treatment occupational therapy;physical therapy;co-treatment (P)   -KA     Row Name 05/04/22 1313          General Information    Patient  Profile Reviewed yes (P)   -KA     Existing Precautions/Restrictions fall (P)   -KA     Barriers to Rehab medically complex (P)   -KA     Row Name 05/04/22 1315          Cognition    Orientation Status (Cognition) oriented to;person;place (P)   -KA     Row Name 05/04/22 1315          Safety Issues, Functional Mobility    Safety Issues Affecting Function (Mobility) ability to follow commands;at risk behavior observed;awareness of need for assistance;impulsivity;judgment;safety precautions follow-through/compliance (P)   -KA     Impairments Affecting Function (Mobility) balance;cognition;endurance/activity tolerance;postural/trunk control;strength;coordination;grasp;pain (P)   -KA     Cognitive Impairments, Mobility Safety/Performance attention;awareness, need for assistance;impulsivity;insight into deficits/self-awareness;safety precaution follow-through (P)   -KA           User Key  (r) = Recorded By, (t) = Taken By, (c) = Cosigned By    Initials Name Provider Type    Mary Prajapati, PT Student PT Student               Mobility     Row Name 05/04/22 1316          Bed Mobility    Supine-Sit [place] (Bed Mobility) minimum assist (75% patient effort);2 person assist;verbal cues;nonverbal cues (demo/gesture) (P)   -     Sit-Supine [place] (Bed Mobility) verbal cues;nonverbal cues (demo/gesture);maximum assist (25% patient effort);2 person assist (P)   -KA     Assistive Device (Bed Mobility) draw sheet;head of bed elevated (P)   -     Row Name 05/04/22 1316          Transfers    Comment, (Transfers) not safe/appropriate on this date (P)   -     Row Name 05/04/22 1316          Bed-Chair Transfer    Bed-Chair [place] (Transfers) not tested (P)   -     Row Name 05/04/22 1316          Sit-Stand Transfer    Sit-Stand [place] (Transfers) maximum assist (25% patient effort);2 person assist;verbal cues;nonverbal cues (demo/gesture) (P)   -     Comment, (Sit-Stand Transfer) B knee block, pt unable  to fully stand secondary to hip pain and wanting to return to bed. (P)   -KA     Row Name 05/04/22 1316          Gait/Stairs (Locomotion)    [place] Level (Gait) not tested (P)   -KA           User Key  (r) = Recorded By, (t) = Taken By, (c) = Cosigned By    Initials Name Provider Type    Mary Prajapati, PT Student PT Student               Obj/Interventions     Row Name 05/04/22 1320          Motor Skills    Motor Skills coordination;functional endurance (P)   -KA     Coordination minimal impairment (P)   -KA     Functional Endurance poor (P)   -KA     Therapeutic Exercise other (see comments) (P)   10 reps APs, LAQs. Max VCs to perform exercises  -     Row Name 05/04/22 1320          Balance    Balance Assessment sitting static balance;sitting dynamic balance (P)   -KA     Static Sitting Balance contact guard;maximum assist;verbal cues;non-verbal cues (demo/gesture) (P)   -KA     Dynamic Sitting Balance minimal assist;maximum assist;verbal cues;non-verbal cues (demo/gesture) (P)   -KA     Comment, Balance Balance varied with max VCs to lean forward but would lean back towards therapist. (P)   -MICKEY           User Key  (r) = Recorded By, (t) = Taken By, (c) = Cosigned By    Initials Name Provider Type    Mary Prajapati, PT Student PT Student               Goals/Plan    No documentation.                Clinical Impression     Row Name 05/04/22 1323          Pain    Pre/Posttreatment Pain Comment R hip pain reported during sitting and standing (P)   -MICKEY     Pain Intervention(s) Repositioned (P)   -     Row Name 05/04/22 1323          Plan of Care Review    Plan of Care Reviewed With patient (P)   -KA     Progress improving (P)   -KA     Outcome Evaluation Pt demonstrates improvement with following commands and sitting balance. Pt demonstrated supine to sit Min Ax2 and sit to supine Max Ax2. Sitting balance ranged from Min A to Max A for multiple reasons including fatigue, VCs to lean forward, increased hip  pain. Pt performed 10 reps of APs and LAQs with Max VCs to finish exercise as attention was short and required reorientation of task. Pt attempt sit to stand but unable to fully stand secondary to pain and wanting to return to bed. PT will continue to follow to address mobility, strength, and endurance. (P)   -KA     Row Name 05/04/22 1323          Therapy Assessment/Plan (PT)    Criteria for Skilled Interventions Met (PT) yes;skilled treatment is necessary (P)   -KA     Row Name 05/04/22 1323          Vital Signs    Pre Patient Position Supine (P)   -KA     Intra Patient Position Standing (P)   -KA     Post Patient Position Supine (P)   -KA     Row Name 05/04/22 1323          Positioning and Restraints    Pre-Treatment Position in bed (P)   -KA     Post Treatment Position bed (P)   -KA     In Bed fowlers;call light within reach;encouraged to call for assist;exit alarm on;with OT (P)   -KA     Restraints soft limb (P)   OT stayed with pt for treatment.  -MICKEY           User Key  (r) = Recorded By, (t) = Taken By, (c) = Cosigned By    Initials Name Provider Type    Mary Prajapati, PT Student PT Student               Outcome Measures     Row Name 05/04/22 1329          How much help from another person do you currently need...    Turning from your back to your side while in flat bed without using bedrails? 2 (P)   -KA     Moving from lying on back to sitting on the side of a flat bed without bedrails? 2 (P)   -KA     Moving to and from a bed to a chair (including a wheelchair)? 2 (P)   -KA     Standing up from a chair using your arms (e.g., wheelchair, bedside chair)? 1 (P)   -KA     Climbing 3-5 steps with a railing? 1 (P)   -KA     To walk in hospital room? 1 (P)   -KA     AM-PAC 6 Clicks Score (PT) 9 (P)   -MICKEY     Highest level of mobility 3 --> Sat at edge of bed (P)   -KA     Row Name 05/04/22 1329 05/04/22 1307       Functional Assessment    Outcome Measure Options AM-PAC 6 Clicks Basic Mobility (PT) (P)   -KA  AM-PAC 6 Clicks Daily Activity (OT)  -          User Key  (r) = Recorded By, (t) = Taken By, (c) = Cosigned By    Initials Name Provider Type     Tesha Charles, OT Occupational Therapist    Mary Prajapati, PT Student PT Student                             Physical Therapy Education                 Title: PT OT SLP Therapies (In Progress)     Topic: Physical Therapy (Done)     Point: Mobility training (Done)     Learning Progress Summary           Patient Acceptance, E,TB,D, VU,DU,NR by MICKEY at 5/4/2022 1332    Nonacceptance, E,TB, NR by MICKEY at 5/2/2022 1121                   Point: Home exercise program (Done)     Learning Progress Summary           Patient Acceptance, E,TB,D, VU,DU,NR by MICKEY at 5/4/2022 1332    Nonacceptance, E,TB, NR by MICKEY at 5/2/2022 1121                   Point: Body mechanics (Done)     Learning Progress Summary           Patient Acceptance, E,TB,D, VU,DU,NR by MICKEY at 5/4/2022 1332    Nonacceptance, E,TB, NR by MICKEY at 5/2/2022 1121                   Point: Precautions (Done)     Learning Progress Summary           Patient Acceptance, E,TB,D, VU,DU,NR by MICKEY at 5/4/2022 1332    Nonacceptance, E,TB, NR by MICKEY at 5/2/2022 1121                               User Key     Initials Effective Dates Name Provider Type [facility] 03/11/22 -  Mary Neal, PT Student PT Student PT              PT Recommendation and Plan     Plan of Care Reviewed With: (P) patient  Progress: (P) improving  Outcome Evaluation: (P) Pt demonstrates improvement with following commands and sitting balance. Pt demonstrated supine to sit Min Ax2 and sit to supine Max Ax2. Sitting balance ranged from Min A to Max A for multiple reasons including fatigue, VCs to lean forward, increased hip pain. Pt performed 10 reps of APs and LAQs with Max VCs to finish exercise as attention was short and required reorientation of task. Pt attempt sit to stand but unable to fully stand secondary to pain and wanting to return to bed. PT  will continue to follow to address mobility, strength, and endurance.     Time Calculation:    PT Charges     Row Name 05/04/22 1332             Time Calculation    Start Time 1120 (P)   -KA      Stop Time 1138 (P)   -KA      Time Calculation (min) 18 min (P)   -KA      PT Received On 05/04/22 (P)   -KA      PT - Next Appointment 05/05/22 (P)   -KA              Time Calculation- PT    Total Timed Code Minutes- PT 18 minute(s) (P)   -KA              Timed Charges    67195 - PT Therapeutic Activity Minutes 18 (P)   -KA              Total Minutes    Timed Charges Total Minutes 18 (P)   -KA       Total Minutes 18 (P)   -KA            User Key  (r) = Recorded By, (t) = Taken By, (c) = Cosigned By    Initials Name Provider Type    Mayr Prajapati, PT Student PT Student              Therapy Charges for Today     Code Description Service Date Service Provider Modifiers Qty    [id number]  PT THERAPEUTIC ACT EA 15 MIN 5/4/2022 Mary Neal, PT Student GP 1    [id number]  PT THER SUPP EA 15 MIN 5/4/2022 Mary Neal, PT Student GP 1          PT G-Codes  Outcome Measure Options: (P) AM-PAC 6 Clicks Basic Mobility (PT)  AM-PAC 6 Clicks Score (PT): (P) 9  AM-PAC 6 Clicks Score (OT): 12  Modified [place] Scale: 5 - Severe disability.  Bedridden, incontinent, and requiring constant nursing care and attention.    Mary Neal PT Student  5/4/2022

## 2022-05-04 NOTE — THERAPY RE-EVALUATION
Acute Care - Speech Language Pathology   Swallow Re-Evaluation [facility]     Patient Name: Zaina Martinez  : 1965  MRN: [id number]  Today's Date: 2022               Admit Date: 2022    Visit Dx:     ICD-10-CM ICD-9-CM   1. Acute metabolic encephalopathy  G93.41 348.31   2. ESRD (end stage renal disease) on dialysis ([facility])  N18.6 585.6    Z99.2 V45.11   3. Acute hypoxemic respiratory failure ([facility])  J96.01 518.81   4. Hyperkalemia  E87.5 276.7   5. ESRD (end stage renal disease) ([facility])  N18.6 585.6     Patient Active Problem List   Diagnosis   • Renal insufficiency   • Hypertensive disorder   • Hypothyroidism   • Acute DM type 2 causing complication ([facility])   • Rheumatoid arthritis ([facility])   • Angioedema   • Esophageal dysmotility   • Anemia   • Medically noncompliant   • Myocardial infarction due to demand ischemia ([facility])   • Enteritis   • PRES (posterior reversible encephalopathy syndrome)   • Urine retention   • Klebsiella infection   • Superficial thrombophlebitis   • Generalized weakness   • ESRD (end stage renal disease) ([facility])   • CAD (coronary artery disease)   • Abnormal urinalysis   • Acute on chronic diastolic CHF (congestive heart failure) ([facility])   • Pyelonephritis   • Calculus of gallbladder with acute on chronic cholecystitis without obstruction   • Pleural effusion on right   • Anemia due to chronic kidney disease, on chronic dialysis ([facility])   • Abnormal findings on diagnostic imaging of other specified body structures   • Acute upper respiratory infection   • Agitation   • Alkaline phosphatase raised   • Casts present in urine   • Cellulitis of toe   • Hip pain   • Community acquired pneumonia   • Depressive disorder   • Diarrhea of presumed infectious origin   • Difficult or painful urination   • Disease due to severe acute respiratory syndrome coronavirus 2 (SARS-CoV-2)   • Dyspnea   • Encounter for follow-up examination after completed treatment for conditions other than malignant neoplasm    • H/O: hypothyroidism   • Hyperlipidemia   • Hypomagnesemia   • Intractable vomiting with nausea   • Leukocytosis   • Luetscher's syndrome   • Need for influenza vaccination   • Restless legs   • Noncompliance with treatment   • Shoulder pain   • Urinary tract infectious disease   • Metabolic encephalopathy   • Abnormal findings on diagnostic imaging of abdomen   • Status post cholecystectomy   • Hyponatremia   • Leukocytosis   • Acute metabolic encephalopathy   • Encephalopathy, toxic   • Acute CVA (cerebrovascular accident) (HCC)   • Intracranial hemorrhage (HCC)     Past Medical History:   Diagnosis Date   • Acute CVA (cerebrovascular accident) (HCC) 5/1/2022   • Acute on chronic diastolic CHF (congestive heart failure) (HCC)    • CAD (coronary artery disease) 12/20/2021   • Diabetes (HCC)    • Disease of thyroid gland    • GERD (gastroesophageal reflux disease)    • Hyperlipidemia 11/30/2018   • Hypertension    • Rheumatoid arthritis (HCC)      Past Surgical History:   Procedure Laterality Date   • CHOLECYSTECTOMY WITH INTRAOPERATIVE CHOLANGIOGRAM N/A 1/10/2022    Procedure: Laparoscopic cholecystectomy with intraoperative cholangiogram;  Surgeon: Ramana Raygoza MD;  Location: [facility];  Service: General;  Laterality: N/A;   • EYE SURGERY     • HYSTERECTOMY     • INSERTION HEMODIALYSIS CATHETER N/A 12/6/2021    Procedure: HEMODIALYSIS CATHETER INSERTION;  Surgeon: Keli Salazar MD;  Location: [facility] 18/19;  Service: Vascular;  Laterality: N/A;   • INSERTION HEMODIALYSIS CATHETER N/A 5/3/2022    Procedure: TUNNELED CATHETER PLACEMENT;  Surgeon: Keli Salazar MD;  Location: [facility];  Service: Vascular;  Laterality: N/A;       SLP Recommendation and Plan  SLP Swallowing Diagnosis: swallow WFL, other (see comments) (risk for aspiration d/t cognitive status) (05/04/22 1030)  SLP Diet Recommendation: regular textures, thin liquids (05/04/22 1030)  Recommended Precautions and  "Strategies: assist with feeding, upright posture during/after eating, small bites of food and sips of liquid (05/04/22 1030)  SLP Rec. for Method of Medication Administration: meds whole, with thin liquids, with pudding or applesauce, as tolerated (05/04/22 1030)     Monitor for Signs of Aspiration: yes, right lower lobe infiltrates (05/04/22 1030)  Recommended Diagnostics: SLE/Cog/Motor Speech Evaluation (05/04/22 1030)  Swallow Criteria for Skilled Therapeutic Interventions Met: demonstrates skilled criteria (05/04/22 1030)  Anticipated Discharge Disposition (SLP): anticipate therapy at next level of care (05/04/22 1030)  Rehab Potential/Prognosis, Swallowing: good, to achieve stated therapy goals (05/04/22 1030)  Therapy Frequency (Swallow): PRN (05/04/22 1030)  Predicted Duration Therapy Intervention (Days): until discharge (05/04/22 1030)                                  Plan of Care Reviewed With: patient  Outcome Evaluation: Patient seen for clinical swallow assessment. No focal weakness in oral mech exam. Improved communication, oriented to name. Pt with anomia, will follow for speech/language assessment. No overt s/s of aspiration with ice or thins via spoon/cup/straw, puree, mixed mech soft, or regular. Multiple swallows with mixed, suspect piece meal swallow. Increased mastication time with solids, patient reports baseline d/t missing teeth. SLP recs regular and thins. Meds as iraida, assist with meals.      SWALLOW EVALUATION (last 72 hours)     SLP Adult Swallow Evaluation     Row Name 05/04/22 1030 05/02/22 0900                Rehab Evaluation    Document Type evaluation  -SH evaluation  -SH       Patient Observations -- poorly cooperative  -SH       Patient Effort adequate  -SH poor  -SH                General Information    Patient Profile Reviewed yes  -SH yes  -SH       Pertinent History Of Current Problem R MCA stroke syndrome, MRI negative with TNK, spon L frontal ICH  -SH \"Her left frontal ICH is " "atypical, as her diffusion was negative in the initial MRI, although it did have some flair changes, which seems to have bled and caused her to have ICH.  Patient continues to be encephalopathic, and given her mental status changes, I would recommend getting an EEG.\" per neuro.  -       Current Method of Nutrition nasogastric feedings;clear liquids  - NPO;nasogastric feedings  -       Precautions/Limitations, Vision WFL  - other (see comments)  unable to assess  -       Precautions/Limitations, Hearing WFL  - WFL;for purposes of eval  -       Prior Level of Function-Communication unknown  - unknown  -       Prior Level of Function-Swallowing unknown  - unknown  -       Plans/Goals Discussed with patient  - patient  -       Barriers to Rehab cognitive status  - cognitive status  -       Patient's Goals for Discharge patient did not state  - --                Pain    Additional Documentation Pain Scale: Word Pre/Post-Treatment (Group)  - Pain Scale: Word Pre/Post-Treatment (Group)  unable to rate pain  -                Pain Scale: Word Pre/Post-Treatment    Pain: Word Scale, Pretreatment 0 - no pain  - --                Oral Motor Structure and Function    Dentition Assessment natural, present and adequate;missing teeth  - --       Volitional Swallow delayed  - --       Volitional Cough weak  - --                Oral Musculature and Cranial Nerve Assessment    Oral Motor General Assessment WFL  - unable to assess  -                Clinical Swallow Eval    Clinical Swallow Evaluation Summary Patient seen for clinical swallow assessment. No focal weakness in oral mech exam. Improved communication, oriented to name. Pt with anomia, will follow for speech/language assessment. No overt s/s of aspiration with ice or thins via spoon/cup/straw, puree, mixed mech soft, or regular. Multiple swallows with mixed, suspect piece meal swallow. Increased mastication time with solids, " patient reports baseline d/t missing teeth. SLP recs regular and thins. Meds as iraida, assist with meals.  - Patient seen for clinical swallow assessment. Pt confused, agitated, nearly pulled out cortrak upon SLP's arrival despite restraints. Not oriented to name. Would not follow commands. Able to repeat at times, question echolalia. Speech often fluent, non sensical. Pt refused all PO, but did accept thins via straw when presented to oral cavity. Voice clear post swallow. Laryngeal elevation adequate. Pt turned head from all trials of puree. Pt did attempt to bite a cracker once, but unable to coordinate biting through the cracker this date. Question some oral apraxia, confusion vs refusal. Patient appears safe for thins. Can allow at this time. Doubt patient will have adequate intake d/t refusals, will defer diet entry to team.  -                SLP Evaluation Clinical Impression    SLP Swallowing Diagnosis swallow WFL;other (see comments)  risk for aspiration d/t cognitive status  - unable/unwilling to cooperate  -       Functional Impact risk of aspiration/pneumonia  - risk of aspiration/pneumonia  -       Rehab Potential/Prognosis, Swallowing good, to achieve stated therapy goals  - good, to achieve stated therapy goals  -       Swallow Criteria for Skilled Therapeutic Interventions Met demonstrates skilled criteria  - demonstrates skilled criteria  -                Recommendations    Therapy Frequency (Swallow) PRN  - PRN  -       Predicted Duration Therapy Intervention (Days) until discharge  - until discharge  -       SLP Diet Recommendation regular textures;thin liquids  - thin liquids  -       Recommended Diagnostics SLE/Cog/Motor Speech Evaluation  - reassess via clinical swallow evaluation  -       Recommended Precautions and Strategies assist with feeding;upright posture during/after eating;small bites of food and sips of liquid  - assist with feeding  -       Oral  Care Recommendations Oral Care BID/PRN  - Oral Care BID/PRN  -       SLP Rec. for Method of Medication Administration meds whole;with thin liquids;with pudding or applesauce;as tolerated  - meds whole;with thin liquids;with pudding or applesauce;as tolerated;meds via alternate route  -       Monitor for Signs of Aspiration yes;right lower lobe infiltrates  - yes;right lower lobe infiltrates  -       Anticipated Discharge Disposition (SLP) anticipate therapy at next level of care  - anticipate therapy at next level of care  -                Swallow Goals (SLP)    Oral Nutrition/Hydration Goal Selection (SLP) oral nutrition/hydration, SLP goal 1  - oral nutrition/hydration, SLP goal 1  -SH                Oral Nutrition/Hydration Goal 1 (SLP)    Oral Nutrition/Hydration Goal 1, SLP Pt will iraida thins without overt s/s of aspiration.  -SH Pt will iraida thins without overt s/s of aspiration.  -       Time Frame (Oral Nutrition/Hydration Goal 1, SLP) by discharge  - by discharge  -       Progress/Outcomes (Oral Nutrition/Hydration Goal 1, SLP) good progress toward goal  -SH --             User Key  (r) = Recorded By, (t) = Taken By, (c) = Cosigned By    Initials Name Effective Dates     Tesha Saucedo MS CCC-SLP 06/16/21 -                 EDUCATION  The patient has been educated in the following areas:   Dysphagia (Swallowing Impairment).        SLP GOALS     Row Name 05/04/22 1030 05/02/22 0900          Oral Nutrition/Hydration Goal 1 (SLP)    Oral Nutrition/Hydration Goal 1, SLP Pt will iraida thins without overt s/s of aspiration.  -SH Pt will iraida thins without overt s/s of aspiration.  -     Time Frame (Oral Nutrition/Hydration Goal 1, SLP) by discharge  - by discharge  -     Progress/Outcomes (Oral Nutrition/Hydration Goal 1, SLP) good progress toward goal  -SH --           User Key  (r) = Recorded By, (t) = Taken By, (c) = Cosigned By    Initials Name Provider Type    Tesha Villarreal MS  CCC-SLP Speech and Language Pathologist              SLP Outcome Measures (last 72 hours)     SLP Outcome Measures     Row Name 05/04/22 1100 05/02/22 1400          SLP Outcome Measures    Outcome Measure Used? Adult NOMS  -SH Adult NOMS  -SH            Adult FCM Scores    FCM Chosen Swallowing  -SH Swallowing  -     Swallowing FCM Score 6  -SH 4  -SH           User Key  (r) = Recorded By, (t) = Taken By, (c) = Cosigned By    Initials Name Effective Dates     Tesha Saucedo MS CCC-SLP 06/16/21 -                  Time Calculation:    Time Calculation- SLP     Row Name 05/04/22 1142             Time Calculation- SLP    SLP Start Time 1040  -      SLP Received On 05/04/22  -              SNF Speech Therapy Minutes    SNF Missed Therapy Minutes 60  -SH            User Key  (r) = Recorded By, (t) = Taken By, (c) = Cosigned By    Initials Name Provider Type     Tesha Saucedo MS CCC-SLP Speech and Language Pathologist                Therapy Charges for Today     Code Description Service Date Service Provider Modifiers Qty    [id number]  ST TREATMENT SWALLOW 4 5/4/2022 Tesha Saucedo MS CCC-SLP GN 1               Tesha Saucedo MS CCC-EUFEMIA  5/4/2022

## 2022-05-04 NOTE — PLAN OF CARE
Goal Outcome Evaluation:  Plan of Care Reviewed With: patient           Outcome Evaluation: Vein mapping this AM, will check back this date.

## 2022-05-04 NOTE — PROGRESS NOTES
LOS: 7 days   Patient Care Team:  Karson Oreilly MD as PCP - General (Family Medicine)    Chief Complaint: Follow-up MRSA hemodialysis catheter infection.    Interval History: Remains confused.  No other acute events.    Vital Signs:  Temp:  [97.9 °F (36.6 °C)-100.2 °F (37.9 °C)] 97.9 °F (36.6 °C)  Heart Rate:  [59-89] 59  BP: (102-155)/() 120/59    Intake/Output Summary (Last 24 hours) at 5/4/2022 1423  Last data filed at 5/4/2022 1200  Gross per 24 hour   Intake 2291.5 ml   Output 3600 ml   Net -1308.5 ml       Physical Exam:   General Appearance:    Agitated, confused, in restraints   Lungs:     Clear to auscultation bilaterally anteriorly    Heart:    Regular rhythm and normal rate.  No murmurs, gallops, or       rubs.   Abdomen:     Soft, nontender, nondistended.    Extremities:  Trace edema of the lower extremities bilaterally     Results Review:    Results from last 7 days   Lab Units 05/04/22  0320   SODIUM mmol/L 132*   POTASSIUM mmol/L 3.9   CHLORIDE mmol/L 99   CO2 mmol/L 19.0*   BUN mg/dL 19   CREATININE mg/dL 2.93*   GLUCOSE mg/dL 206*   CALCIUM mg/dL 7.9*         Results from last 7 days   Lab Units 05/04/22  0320   WBC 10*3/mm3 17.72*   HEMOGLOBIN g/dL 9.6*   HEMATOCRIT % 29.8*   PLATELETS 10*3/mm3 214     Results from last 7 days   Lab Units 04/29/22  2317 04/27/22  1615   INR  0.97 1.02   APTT seconds 29.4 28.2     Results from last 7 days   Lab Units 04/30/22  0327   CHOLESTEROL mg/dL 145     Results from last 7 days   Lab Units 05/03/22  0340   MAGNESIUM mg/dL 1.8     Results from last 7 days   Lab Units 04/30/22  0327   CHOLESTEROL mg/dL 145   TRIGLYCERIDES mg/dL 119   HDL CHOL mg/dL 44   LDL CHOL mg/dL 80       I reviewed the patient's new clinical results.        Assessment:  1.  MRSA hemodialysis catheter infection   2.  Left frontal intracerebral hemorrhage status post lytic therapy  3.  End-stage renal disease, on hemodialysis  4.  Altered mental status, multifactorial  5.  Acute  on chronic diastolic CHF  6.  Pulmonary hypertension  7.  Reported history of coronary artery disease  8.  COVID pneumonia in April 2022  9.  Rheumatoid arthritis    Plan:  -The patient remains confused, and has had a recent brain bleed.  Again, I do not feel that the ANJEL is urgent, and I would like to delay this until she is more stable from a mental standpoint for her safety.  I do not feel she is going to be ready for a ANJEL anytime this week.  We will plan on reevaluating her on Monday to see if she is more appropriate at that time.    Bradley Montoya MD  05/04/22  14:23 EDT

## 2022-05-04 NOTE — PLAN OF CARE
Goal Outcome Evaluation:  Plan of Care Reviewed With: (P) patient        Progress: (P) improving  Outcome Evaluation: (P) Pt demonstrates improvement with following commands and sitting balance. Pt demonstrated supine to sit Min Ax2 and sit to supine Max Ax2. Sitting balance ranged from Min A to Max A for multiple reasons including fatigue, VCs to lean forward, increased hip pain. Pt performed 10 reps of APs and LAQs with Max VCs to finish exercise as attention was short and required reorientation of task. Pt attempt sit to stand but unable to fully stand secondary to pain and wanting to return to bed. PT will continue to follow to address mobility, strength, and endurance.

## 2022-05-04 NOTE — PLAN OF CARE
Goal Outcome Evaluation:  Plan of Care Reviewed With: patient           Outcome Evaluation: Patient seen for clinical swallow assessment. No focal weakness in oral mech exam. Improved communication, oriented to name. Pt with anomia, will follow for speech/language assessment. No overt s/s of aspiration with ice or thins via spoon/cup/straw, puree, mixed mech soft, or regular. Multiple swallows with mixed, suspect piece meal swallow. Increased mastication time with solids, patient reports baseline d/t missing teeth. SLP recs regular and thins. Meds as iraida, assist with meals.    Patient was not wearing a face mask during this therapy encounter. Therapist used appropriate personal protective equipment including mask, eye protection and gloves.  Mask used was standard procedure mask. Appropriate PPE was worn during the entire therapy session. Hand hygiene was completed before and after therapy session. Patient is not in enhanced droplet precautions.

## 2022-05-04 NOTE — PROGRESS NOTES
"DAILY PROGRESS NOTE  [facility]    Patient Identification:  Name: Zaina Martinez  Age: 56 y.o.  Sex: female  :  1965  MRN: [id number]         Primary Care Physician: Karson Oreilly MD    Subjective:  Interval History: She is weak.  Confused but better and out of restraints. Talking more.    Objective:    Scheduled Meds:amLODIPine, 10 mg, Oral, Q24H  atorvastatin, 40 mg, Oral, Nightly  cloNIDine, 0.1 mg, Oral, Q6H   Followed by  [START ON 2022] cloNIDine, 0.1 mg, Oral, Q8H   Followed by  [START ON 2022] cloNIDine, 0.1 mg, Oral, Q12H   Followed by  [START ON 2022] cloNIDine, 0.1 mg, Oral, Once  heparin (porcine), 3,000 Units, Intracatheter, Once  hydrALAZINE, 10 mg, Oral, Q8H  insulin lispro, 0-14 Units, Subcutaneous, Q6H  levothyroxine, 125 mcg, Oral, Q AM  OLANZapine, 10 mg, Oral, BID  pantoprazole, 40 mg, Intravenous, Q AM  rOPINIRole, 0.75 mg, Oral, Nightly  sertraline, 100 mg, Oral, Daily  sodium chloride, 10 mL, Intravenous, Q12H  tamsulosin, 0.4 mg, Oral, Daily  Vancomycin Pharmacy Intermittent/Pulse Dosing, , Does not apply, Daily      Continuous Infusions:niCARdipine, 5-15 mg/hr, Last Rate: Stopped (22 0706)  Pharmacy Consult,   Pharmacy to dose vancomycin,         Vital signs in last 24 hours:  Temp:  [97.9 °F (36.6 °C)-100.2 °F (37.9 °C)] 97.9 °F (36.6 °C)  Heart Rate:  [59-89] 59  BP: (102-155)/() 120/59    Intake/Output:    Intake/Output Summary (Last 24 hours) at 2022 1443  Last data filed at 2022 1200  Gross per 24 hour   Intake 2291.5 ml   Output 3600 ml   Net -1308.5 ml       Exam:  /59   Pulse 59   Temp 97.9 °F (36.6 °C) (Oral)   Resp 16   Ht 157.5 cm (62.01\")   Wt 57.2 kg (126 lb 1.7 oz)   SpO2 95%   BMI 23.06 kg/m²     General Appearance:    Alert, cooperative, no distress   Head:    Normocephalic, without obvious abnormality, atraumatic   Eyes:       Throat:   Lips, tongue, gums normal   Neck:   Supple, symmetrical, trachea " midline, no JVD   Lungs:     Clear to auscultation bilaterally, respirations unlabored   Chest Wall:    No tenderness or deformity    Heart:    Regular rate and rhythm, S1 and S2 normal, no murmur,no  Rub or gallop   Abdomen:     Soft, nontender, bowel sounds active, no masses, no organomegaly    Extremities:   Extremities normal, atraumatic, no cyanosis or edema   Pulses:      Skin:   Skin is warm and dry,  no rashes or palpable lesions   Neurologic:  She is weak from stroke and not talking much.  confused      Lab Results (last 72 hours)     Procedure Component Value Units Date/Time    POC Glucose Once [[id number]]  (Normal) Collected: 04/30/22 1308    Specimen: Blood Updated: 04/30/22 1309     Glucose 80 mg/dL      Comment: Meter: [id number] : [id number] Chon Fernández RN       Blood Culture - Blood, Hand, Left [[id number]]  (Normal) Collected: 04/28/22 1113    Specimen: Blood from Hand, Left Updated: 04/30/22 1131     Blood Culture No growth at 2 days    Blood Culture - Blood, Arm, Right [[id number]]  (Normal) Collected: 04/28/22 1102    Specimen: Blood from Arm, Right Updated: 04/30/22 1130     Blood Culture No growth at 2 days    POC Glucose Once [[id number]]  (Normal) Collected: 04/30/22 0622    Specimen: Blood Updated: 04/30/22 0623     Glucose 87 mg/dL      Comment: Meter: [id number] : [id number] Emilie SAAVEDRA       Catheter Culture - Cath Tip, Neck [[id number]]  (Abnormal)  (Susceptibility) Collected: 04/28/22 1438    Specimen: Cath Tip from Neck Updated: 04/30/22 0619     CATHETER CULTURE >15 CFU/mL - Indicative of infection. Staphylococcus aureus, MRSA     Comment: Methicillin resistant Staphylococcus aureus, Patient may be an isolation risk.       Susceptibility      Staphylococcus aureus, MRSA      ARSENIO      Gentamicin Susceptible      Oxacillin Resistant     Rifampin Susceptible      Vancomycin Susceptible                    Linear View               Susceptibility Comments     Staphylococcus  aureus, MRSA    This isolate does not demonstrate inducible clindamycin resistance in vitro.               Renal Function Panel [[id number]]  (Abnormal) Collected: 04/30/22 0327    Specimen: Blood Updated: 04/30/22 0415     Glucose 94 mg/dL      BUN 36 mg/dL      Creatinine 4.89 mg/dL      Sodium 130 mmol/L      Potassium 4.9 mmol/L      Chloride 93 mmol/L      CO2 22.0 mmol/L      Calcium 8.0 mg/dL      Albumin 3.20 g/dL      Phosphorus 3.8 mg/dL      Anion Gap 15.0 mmol/L      BUN/Creatinine Ratio 7.4     eGFR 9.9 mL/min/1.73      Comment: <15 Indicative of kidney failure       Narrative:      GFR Normal >60  Chronic Kidney Disease <60  Kidney Failure <15      Vancomycin, Random [[id number]]  (Normal) Collected: 04/30/22 0327    Specimen: Blood Updated: 04/30/22 0415     Vancomycin Random 12.70 mcg/mL     Narrative:      Therapeutic Ranges for Vancomycin    Vancomycin Random   5.0-40.0 mcg/mL  Vancomycin Trough   5.0-20.0 mcg/mL  Vancomycin Peak     20.0-40.0 mcg/mL    Lipid Panel [[id number]] Collected: 04/30/22 0327    Specimen: Blood Updated: 04/30/22 0415     Total Cholesterol 145 mg/dL      Triglycerides 119 mg/dL      HDL Cholesterol 44 mg/dL      LDL Cholesterol  80 mg/dL      VLDL Cholesterol 21 mg/dL      LDL/HDL Ratio 1.75    Narrative:      Cholesterol Reference Ranges  (U.S. Department of Health and Human Services ATP III Classifications)    Desirable          <200 mg/dL  Borderline High    200-239 mg/dL  High Risk          >240 mg/dL      Triglyceride Reference Ranges  (U.S. Department of Health and Human Services ATP III Classifications)    Normal           <150 mg/dL  Borderline High  150-199 mg/dL  High             200-499 mg/dL  Very High        >500 mg/dL    HDL Reference Ranges  (U.S. Department of Health and Human Services ATP III Classifications)    Low     <40 mg/dl (major risk factor for CHD)  High    >60 mg/dl ('negative' risk factor for CHD)        LDL Reference Ranges  (U.S. Department of  Health and Human Services ATP III Classifications)    Optimal          <100 mg/dL  Near Optimal     100-129 mg/dL  Borderline High  130-159 mg/dL  High             160-189 mg/dL  Very High        >189 mg/dL    Fibrinogen [[id number]]  (Abnormal) Collected: 04/30/22 0327    Specimen: Blood Updated: 04/30/22 0412     Fibrinogen 494 mg/dL     POC Glucose Once [[id number]]  (Abnormal) Collected: 04/30/22 0018    Specimen: Blood Updated: 04/30/22 0019     Glucose 133 mg/dL      Comment: Meter: [id number] : [id number] Emilie SAAVEDRA       Fibrinogen [[id number]]  (Normal) Collected: 04/29/22 2317    Specimen: Blood Updated: 04/29/22 2339     Fibrinogen 381 mg/dL     Protime-INR [[id number]]  (Normal) Collected: 04/29/22 2317    Specimen: Blood Updated: 04/29/22 2339     Protime 12.8 Seconds      INR 0.97    aPTT [[id number]]  (Normal) Collected: 04/29/22 2317    Specimen: Blood Updated: 04/29/22 2339     PTT 29.4 seconds     CBC & Differential [[id number]]  (Abnormal) Collected: 04/29/22 2317    Specimen: Blood Updated: 04/29/22 2325    Narrative:      The following orders were created for panel order CBC & Differential.  Procedure                               Abnormality         Status                     ---------                               -----------         ------                     CBC Auto Differential[[id number]]        Abnormal            Final result                 Please view results for these tests on the individual orders.    CBC Auto Differential [[id number]]  (Abnormal) Collected: 04/29/22 2317    Specimen: Blood Updated: 04/29/22 2325     WBC 17.03 10*3/mm3      RBC 3.88 10*6/mm3      Hemoglobin 11.0 g/dL      Hematocrit 31.9 %      MCV 82.2 fL      MCH 28.4 pg      MCHC 34.5 g/dL      RDW 18.4 %      RDW-SD 54.2 fl      MPV 10.4 fL      Platelets 150 10*3/mm3      Neutrophil % 86.7 %      Lymphocyte % 5.9 %      Monocyte % 6.3 %      Eosinophil % 0.2 %      Basophil % 0.3 %      Immature Grans % 0.6  %      Neutrophils, Absolute 14.78 10*3/mm3      Lymphocytes, Absolute 1.00 10*3/mm3      Monocytes, Absolute 1.07 10*3/mm3      Eosinophils, Absolute 0.03 10*3/mm3      Basophils, Absolute 0.05 10*3/mm3      Immature Grans, Absolute 0.10 10*3/mm3      nRBC 0.0 /100 WBC     POC Glucose Once [[id number]]  (Normal) Collected: 04/29/22 2108    Specimen: Blood Updated: 04/29/22 2109     Glucose 79 mg/dL      Comment: Meter: [id number] : [id number] Karon Cervantes RN       POC Glucose Once [[id number]]  (Normal) Collected: 04/29/22 1641    Specimen: Blood Updated: 04/29/22 1642     Glucose 72 mg/dL      Comment: Meter: [id number] : [id number] Tyron Ornelas RN       POC Glucose Once [[id number]]  (Normal) Collected: 04/29/22 1525    Specimen: Blood Updated: 04/29/22 1527     Glucose 74 mg/dL      Comment: Meter: [id number] : [id number] Tiago Navarro CNA       POC Glucose Once [[id number]]  (Abnormal) Collected: 04/29/22 1109    Specimen: Blood Updated: 04/29/22 1110     Glucose 170 mg/dL      Comment: Meter: [id number] : [id number] Tiago Navarro CNA       Urine Culture - Urine, Urine, Catheter [[id number]]  (Abnormal) Collected: 04/27/22 1712    Specimen: Urine, Catheter Updated: 04/29/22 0946     Urine Culture <10,000 CFU/mL Staphylococcus aureus, MRSA     Comment: Call if further workup needed.    Methicillin resistant Staph aureus, patient may be an isolation risk.  Staphylococcus aureus is not typically regarded as a uropathogen, except in cases with genitourinary instrumentation present.  It's presence suggests an alternate source (e.g. bloodstream, abscess, SSTI, etc.).  Please evaluate accordingly.       POC Glucose Once [[id number]]  (Normal) Collected: 04/29/22 0607    Specimen: Blood Updated: 04/29/22 0609     Glucose 129 mg/dL      Comment: Meter: [id number] : [id number] Hakeem Santacruz RN       Renal Function Panel [[id number]]  (Abnormal) Collected: 04/29/22 0332    Specimen: Blood Updated: 04/29/22  0445     Glucose 179 mg/dL      BUN 32 mg/dL      Creatinine 4.12 mg/dL      Sodium 132 mmol/L      Potassium 4.3 mmol/L      Chloride 95 mmol/L      CO2 26.2 mmol/L      Calcium 8.4 mg/dL      Albumin 2.90 g/dL      Phosphorus 3.7 mg/dL      Anion Gap 10.8 mmol/L      BUN/Creatinine Ratio 7.8     eGFR 12.1 mL/min/1.73      Comment: <15 Indicative of kidney failure       Narrative:      GFR Normal >60  Chronic Kidney Disease <60  Kidney Failure <15      CBC & Differential [[id number]]  (Abnormal) Collected: 04/29/22 0332    Specimen: Blood Updated: 04/29/22 0353    Narrative:      The following orders were created for panel order CBC & Differential.  Procedure                               Abnormality         Status                     ---------                               -----------         ------                     CBC Auto Differential[[id number]]        Abnormal            Final result                 Please view results for these tests on the individual orders.    CBC Auto Differential [[id number]]  (Abnormal) Collected: 04/29/22 0332    Specimen: Blood Updated: 04/29/22 0353     WBC 14.34 10*3/mm3      RBC 3.83 10*6/mm3      Hemoglobin 10.5 g/dL      Hematocrit 31.9 %      MCV 83.3 fL      MCH 27.4 pg      MCHC 32.9 g/dL      RDW 18.5 %      RDW-SD 55.5 fl      MPV 10.1 fL      Platelets 128 10*3/mm3      Neutrophil % 78.2 %      Lymphocyte % 12.0 %      Monocyte % 8.9 %      Eosinophil % 0.3 %      Basophil % 0.2 %      Neutrophils, Absolute 11.22 10*3/mm3      Lymphocytes, Absolute 1.72 10*3/mm3      Monocytes, Absolute 1.27 10*3/mm3      Eosinophils, Absolute 0.04 10*3/mm3      Basophils, Absolute 0.03 10*3/mm3     POC Glucose Once [[id number]]  (Abnormal) Collected: 04/28/22 2020    Specimen: Blood Updated: 04/28/22 2022     Glucose 242 mg/dL      Comment: Meter: [id number] : [id number] Cedric Jessy HAMLET       POC Glucose Once [[id number]]  (Abnormal) Collected: 04/28/22 1612    Specimen: Blood Updated:  "04/28/22 1614     Glucose 135 mg/dL      Comment: Meter: [id number] : [id number] Domo SAAVEDRA       Procalcitonin [[id number]]  (Normal) Collected: 04/28/22 0434    Specimen: Blood Updated: 04/28/22 1336     Procalcitonin 0.24 ng/mL     Narrative:      As a Marker for Sepsis (Non-Neonates):    1. <0.5 ng/mL represents a low risk of severe sepsis and/or septic shock.  2. >2 ng/mL represents a high risk of severe sepsis and/or septic shock.    As a Marker for Lower Respiratory Tract Infections that require antibiotic therapy:    PCT on Admission    Antibiotic Therapy       6-12 Hrs later    >0.5                Strongly Recommended  >0.25 - <0.5        Recommended   0.1 - 0.25          Discouraged              Remeasure/reassess PCT  <0.1                Strongly Discouraged     Remeasure/reassess PCT    As 28 day mortality risk marker: \"Change in Procalcitonin Result\" (>80% or <=80%) if Day 0 (or Day 1) and Day 4 values are available. Refer to [email or web address]    Change in PCT <=80%  A decrease of PCT levels below or equal to 80% defines a positive change in PCT test result representing a higher risk for 28-day all-cause mortality of patients diagnosed with severe sepsis for septic shock.    Change in PCT >80%  A decrease of PCT levels of more than 80% defines a negative change in PCT result representing a lower risk for 28-day all-cause mortality of patients diagnosed with severe sepsis or septic shock.       Hemoglobin A1c [[id number]]  (Abnormal) Collected: 04/28/22 1103    Specimen: Blood Updated: 04/28/22 1148     Hemoglobin A1C 5.80 %     Narrative:      Hemoglobin A1C Ranges:    Increased Risk for Diabetes  5.7% to 6.4%  Diabetes                     >= 6.5%  Diabetic Goal                < 7.0%    POC Glucose Once [[id number]]  (Normal) Collected: 04/28/22 1124    Specimen: Blood Updated: 04/28/22 1127     Glucose 105 mg/dL      Comment: Meter: [id number] : [id number] SAAVEDRA "       POC Glucose Once [[id number]]  (Normal) Collected: 04/28/22 0638    Specimen: Blood Updated: 04/28/22 0655     Glucose 113 mg/dL      Comment: Meter: [id number] : [id number] Nabil SAAVEDRA       Renal Function Panel [[id number]]  (Abnormal) Collected: 04/28/22 0434    Specimen: Blood Updated: 04/28/22 0549     Glucose 106 mg/dL      BUN 21 mg/dL      Creatinine 3.06 mg/dL      Sodium 135 mmol/L      Potassium 4.6 mmol/L      Chloride 97 mmol/L      CO2 24.5 mmol/L      Calcium 9.0 mg/dL      Albumin 3.40 g/dL      Phosphorus 2.6 mg/dL      Anion Gap 13.5 mmol/L      BUN/Creatinine Ratio 6.9     eGFR 17.3 mL/min/1.73      Comment: National Kidney Foundation and American Society of Nephrology (ASN) Task Force recommended calculation based on the Chronic Kidney Disease Epidemiology Collaboration (CKD-EPI) equation refit without adjustment for race.       Narrative:      GFR Normal >60  Chronic Kidney Disease <60  Kidney Failure <15      Magnesium [[id number]]  (Normal) Collected: 04/28/22 0434    Specimen: Blood Updated: 04/28/22 0535     Magnesium 1.7 mg/dL     CBC & Differential [[id number]]  (Abnormal) Collected: 04/28/22 0434    Specimen: Blood Updated: 04/28/22 0520    Narrative:      The following orders were created for panel order CBC & Differential.  Procedure                               Abnormality         Status                     ---------                               -----------         ------                     CBC Auto Differential[[id number]]        Abnormal            Final result                 Please view results for these tests on the individual orders.    CBC Auto Differential [[id number]]  (Abnormal) Collected: 04/28/22 0434    Specimen: Blood Updated: 04/28/22 0520     WBC 13.49 10*3/mm3      RBC 4.13 10*6/mm3      Hemoglobin 11.3 g/dL      Hematocrit 33.3 %      MCV 80.6 fL      MCH 27.4 pg      MCHC 33.9 g/dL      RDW 18.4 %      RDW-SD 53.4 fl      MPV 10.4 fL      Platelets 137  10*3/mm3      Neutrophil % 82.8 %      Lymphocyte % 8.6 %      Monocyte % 7.6 %      Eosinophil % 0.1 %      Basophil % 0.5 %      Neutrophils, Absolute 11.17 10*3/mm3      Lymphocytes, Absolute 1.16 10*3/mm3      Monocytes, Absolute 1.03 10*3/mm3      Eosinophils, Absolute 0.01 10*3/mm3      Basophils, Absolute 0.07 10*3/mm3     Urinalysis, Microscopic Only - Urine, Catheter [[id number]]  (Abnormal) Collected: 04/27/22 1712    Specimen: Urine, Catheter Updated: 04/27/22 2301     RBC, UA 6-12 /HPF      WBC, UA 31-50 /HPF      Bacteria, UA Trace /HPF      Squamous Epithelial Cells, UA 0-2 /HPF      Hyaline Casts, UA 3-6 /LPF      Methodology Manual Light Microscopy    Urinalysis With Culture If Indicated - Urine, Catheter [[id number]]  (Abnormal) Collected: 04/27/22 1712    Specimen: Urine, Catheter Updated: 04/27/22 2247     Color, UA Yellow     Appearance, UA Clear     pH, UA 8.0     Specific Gravity, UA 1.020     Glucose,  mg/dL (1+)     Ketones, UA Negative     Bilirubin, UA Negative     Blood, UA Small (1+)     Protein, UA >=300 mg/dL (3+)     Leuk Esterase, UA Small (1+)     Nitrite, UA Negative     Urobilinogen, UA 0.2 E.U./dL    COVID PRE-OP / PRE-PROCEDURE SCREENING ORDER (NO ISOLATION) - Swab, Nasopharynx [[id number]]  (Normal) Collected: 04/27/22 1711    Specimen: Swab from Nasopharynx Updated: 04/27/22 1810    Narrative:      The following orders were created for panel order COVID PRE-OP / PRE-PROCEDURE SCREENING ORDER (NO ISOLATION) - Swab, Nasopharynx.  Procedure                               Abnormality         Status                     ---------                               -----------         ------                     COVID-19,[facility] IN-HOUSE...[[id number]]  Normal              Final result                 Please view results for these tests on the individual orders.    COVID-19,[facility] IN-HOUSE CEPHEID/MARTY NP SWAB IN TRANSPORT MEDIA 8-12 HR TAT - Swab, Nasopharynx [[id number]]  (Normal)  Collected: 04/27/22 1711    Specimen: Swab from Nasopharynx Updated: 04/27/22 1810     COVID19 Not Detected    Narrative:      Fact sheet for providers: https://www.fda.gov/media/458574/download     Fact sheet for patients: https://www.fda.gov/media/772879/download    Urine Drug Screen - Urine, Clean Catch [[id number]]  (Abnormal) Collected: 04/27/22 1712    Specimen: Urine, Clean Catch Updated: 04/27/22 1804     Amphet/Methamphet, Screen Negative     Barbiturates Screen, Urine Negative     Benzodiazepine Screen, Urine Positive     Cocaine Screen, Urine Negative     Opiate Screen Negative     THC, Screen, Urine Negative     Methadone Screen, Urine Negative     Oxycodone Screen, Urine Positive    Narrative:      Negative Thresholds Per Drugs Screened:    Amphetamines                 500 ng/ml  Barbiturates                 200 ng/ml  Benzodiazepines              100 ng/ml  Cocaine                      300 ng/ml  Methadone                    300 ng/ml  Opiates                      300 ng/ml  Oxycodone                    100 ng/ml  THC                           50 ng/ml    The Normal Value for all drugs tested is negative. This report includes final unconfirmed screening results to be used for medical treatment purposes only. Unconfirmed results must not be used for non-medical purposes such as employment or legal testing. Clinical consideration should be applied to any drug of abuse test, particularly when unconfirmed results are used.            Comprehensive Metabolic Panel [[id number]]  (Abnormal) Collected: 04/27/22 1615    Specimen: Blood Updated: 04/27/22 1722     Glucose 178 mg/dL      BUN 57 mg/dL      Creatinine 5.37 mg/dL      Sodium 132 mmol/L      Potassium 6.7 mmol/L      Chloride 94 mmol/L      CO2 24.1 mmol/L      Calcium 9.0 mg/dL      Total Protein 6.9 g/dL      Albumin 3.80 g/dL      ALT (SGPT) 14 U/L      AST (SGOT) 30 U/L      Alkaline Phosphatase 140 U/L      Total Bilirubin 0.9 mg/dL      Globulin  3.1 gm/dL      A/G Ratio 1.2 g/dL      BUN/Creatinine Ratio 10.6     Anion Gap 13.9 mmol/L      eGFR 8.8 mL/min/1.73      Comment: <15 Indicative of kidney failure       Narrative:      GFR Normal >60  Chronic Kidney Disease <60  Kidney Failure <15      Blood Gas, Arterial - [[id number]]  (Abnormal) Collected: 04/27/22 1648    Specimen: Arterial Blood Updated: 04/27/22 1659     Site Arterial: right radial     Omar's Test Positive     pH, Arterial 7.395 pH units      pCO2, Arterial 44.0 mm Hg      pO2, Arterial 52.8 mm Hg      Comment: Meter: [id number] : [id number] Roshan NicoleCritical:Notify Dr dr madan farooq (27-Apr-22 16:53:55)Read back ok        HCO3, Arterial 27.0 mmol/L      Base Excess, Arterial 1.7 mmol/L      O2 Saturation Calculated 86.6 %      A-a Gradiant 0.5 mmHg      Barometric Pressure for Blood Gas 759.5 mmHg      Modality Room Air     FIO2 21 %      Rate 18 Breaths/minute     Ethanol [[id number]] Collected: 04/27/22 1615    Specimen: Blood Updated: 04/27/22 1657     Ethanol <10 mg/dL      Ethanol % <0.010 %         Data Review:  Results from last 7 days   Lab Units 05/04/22 0320 05/03/22  0340 05/02/22  0329   SODIUM mmol/L 132* 129* 130*   POTASSIUM mmol/L 3.9 4.6 3.9   CHLORIDE mmol/L 99 96* 95*   CO2 mmol/L 19.0* 21.0* 22.3   BUN mg/dL 19 39* 26*   CREATININE mg/dL 2.93* 5.04* 4.23*   GLUCOSE mg/dL 206* 183* 119*   CALCIUM mg/dL 7.9* 7.9* 7.9*     Results from last 7 days   Lab Units 05/04/22  0320 05/03/22  0340 05/02/22  0329   WBC 10*3/mm3 17.72* 16.34* 10.55   HEMOGLOBIN g/dL 9.6* 10.0* 10.8*   HEMATOCRIT % 29.8* 30.7* 32.3*   PLATELETS 10*3/mm3 214 193 203         Results from last 7 days   Lab Units 04/28/22  1103   HEMOGLOBIN A1C % 5.80*     Lab Results   Lab Value Date/Time    TROPONINT 0.092 (C) 12/19/2021 1314    TROPONINT 0.117 (C) 11/30/2021 0153    TROPONINT 0.132 (C) 11/29/2021 2121     Results from last 7 days   Lab Units 04/30/22  0327   CHOLESTEROL mg/dL 145    TRIGLYCERIDES mg/dL 119   HDL CHOL mg/dL 44   LDL CHOL mg/dL 80     Results from last 7 days   Lab Units 04/27/22  1615   ALK PHOS U/L 140*   BILIRUBIN mg/dL 0.9   ALT (SGPT) U/L 14   AST (SGOT) U/L 30         Results from last 7 days   Lab Units 04/28/22  1103   HEMOGLOBIN A1C % 5.80*     Glucose   Date/Time Value Ref Range Status   05/04/2022 1151 216 (H) 70 - 130 mg/dL Final     Comment:     Meter: [id number] : [id number] Nicole SAAVEDRA   05/04/2022 0608 271 (H) 70 - 130 mg/dL Final     Comment:     Meter: [id number] : [id number] Sadiq SAAVEDRA   05/04/2022 0006 189 (H) 70 - 130 mg/dL Final     Comment:     Meter: [id number] : [id number] Sadiq SAAVEDRA   05/03/2022 1725 247 (H) 70 - 130 mg/dL Final     Comment:     Meter: [id number] : [id number] Jacob Moss RN   05/03/2022 1127 186 (H) 70 - 130 mg/dL Final     Comment:     Meter: [id number] : [id number] Jacob Moss RN   05/03/2022 0757 205 (H) 70 - 130 mg/dL Final     Comment:     Meter: [id number] : [id number] Jacob Moss RN   05/02/2022 1941 195 (H) 70 - 130 mg/dL Final     Comment:     Meter: [id number] : [id number] Sadiq SAAVEDRA   05/02/2022 1733 145 (H) 70 - 130 mg/dL Final     Comment:     Meter: [id number] : [id number] Jacob Moss RN     Results from last 7 days   Lab Units 04/29/22  2317 04/27/22  1615   INR  0.97 1.02       Past Medical History:   Diagnosis Date   • Acute CVA (cerebrovascular accident) (HCC) 5/1/2022   • Acute on chronic diastolic CHF (congestive heart failure) (HCC)    • CAD (coronary artery disease) 12/20/2021   • Diabetes (HCC)    • Disease of thyroid gland    • GERD (gastroesophageal reflux disease)    • Hyperlipidemia 11/30/2018   • Hypertension    • Rheumatoid arthritis (HCC)        Assessment:  Active Hospital Problems    Diagnosis  POA   • **Encephalopathy, toxic [G92.9]  Unknown   • Acute CVA (cerebrovascular accident) (HCC) [I63.9]  Unknown   • Intracranial  hemorrhage (HCC) [I62.9]  Unknown   • Acute metabolic encephalopathy [G93.41]  Yes   • Leukocytosis [D72.829]  Yes   • Acute on chronic diastolic CHF (congestive heart failure) (HCC) [I50.33]  Yes   • CAD (coronary artery disease) [I25.10]  Yes   • ESRD (end stage renal disease) (HCC) [N18.6]  Yes   • Hypertensive disorder [I10]  Yes   • Acute DM type 2 causing complication (HCC) [E11.8]  Yes      Resolved Hospital Problems   No resolved problems to display.       Plan:  Continue with current medication.  ICU support.  As per multiple consultants.  Follow-up lab. HD per renal.  Out of  Restraints.  Probably out of ICU soon.  Antibiotics per ID.    Aris Isbell MD  5/4/2022  14:43 EDT

## 2022-05-04 NOTE — PROGRESS NOTES
"[facility] Clinical Pharmacy Services: Vancomycin Monitoring Note    Zaina Martinez is a 56 y.o. female who is on day 6 (stop date updated to 5/13) of pharmacy to dose vancomycin for SSTI per Dr. Soriano's request. Dr. Canas following for sepsis and HD catheter-related infection w/MRSA.    Previous Vancomycin Dose:  500 mg IV once 5/4 @ 0028  Updated Cultures and Sensitivities: catheter culture MRSA, blood cx NGTD x 2  Results from last 7 days   Lab Units 05/04/22  0320 05/03/22  0340 05/02/22  0329   VANCOMYCIN RM mcg/mL 21.70 16.10 19.70     Vitals/Labs  Ht: 157.5 cm (62.01\"); Wt: 57.2 kg (126 lb 1.7 oz)   Temp Readings from Last 1 Encounters:   05/04/22 97.9 °F (36.6 °C) (Oral)     Estimated Creatinine Clearance: 19.4 mL/min (A) (by C-G formula based on SCr of 2.93 mg/dL (H)).   Dialysis TTS    Results from last 7 days   Lab Units 05/04/22  0320 05/03/22  0340 05/02/22  0329   CREATININE mg/dL 2.93* 5.04* 4.23*   WBC 10*3/mm3 17.72* 16.34* 10.55     Assessment/Plan    Current Vancomycin Dose: intermittent due to ESRD/HD. Will plan for HD on 5/5 and then a dose of 500 mg IV once  Next Level Date and Time: Vanc Random on 5/7 at AM labs prior to HD on Saturday  We will continue to monitor patient changes and renal function     Thank you for involving pharmacy in this patient's care. Please contact pharmacy with any questions or concerns.       Reji Lopez, PharmD  Pharmacy Resident  05/04/22 12:40 EDT              "

## 2022-05-04 NOTE — PROGRESS NOTES
"                                              LOS: 7 days   Patient Care Team:  Karson Oreilly MD as PCP - General (Family Medicine)    Chief Complaint:  F/up stroke, intracranial hemorrhage and critical care management    Subjective   Interval History      Looks better today.  She is much less confused.  She can answer questions and oriented to the most part.  She still not quite back to her normal baseline.    Off Precedex.  Not requiring intravenous antihypertensive.  WBC increased but she has no fever.        REVIEW OF SYSTEMS:   Cardiovascular: No chest pain or palpitation  Respiratory: No shortness of breath or cough.  On RA.  Gastrointestinal: No abdominal pain, nausea or vomiting.    Ventilator/Non-Invasive Ventilation Settings (From admission, onward)            None                Physical Exam:     Vital Signs  Temp:  [97.9 °F (36.6 °C)-100.2 °F (37.9 °C)] 97.9 °F (36.6 °C)  Heart Rate:  [59-89] 59  BP: (102-155)/() 120/59    Intake/Output Summary (Last 24 hours) at 5/4/2022 1516  Last data filed at 5/4/2022 1200  Gross per 24 hour   Intake 2291.5 ml   Output 3600 ml   Net -1308.5 ml     Flowsheet Rows    Flowsheet Row First Filed Value   Admission Height 157.5 cm (62\") Documented at 04/28/2022 0427   Admission Weight 54.8 kg (120 lb 13 oz) Documented at 04/28/2022 0427          PPE used per hospital policy    General Appearance:   Alert.  Cooperative.  In no distress.   ENMT:  Gibson 4.  Moist tongue   Eyes:  Pupils equal and reactive to light. EOMI   Neck:    Trachea midline. No thyromegaly.   Lungs:    Clear to auscultation,respirations regular, even and nonlabored    Heart:   Regular rhythm and normal rate, normal S1 and S2, no         murmur   Skin:   No rash or ecchymosis   Abdomen:     Soft. No tenderness. No HSM.   Neuro/psych:  Conscious, alert.  Oriented to year and place and situation.  Moves all extremities and seems symmetrical.  No deficit.   Extremities:  No cyanosis, clubbing or " edema.  Warm extremities and well-perfused          Results Review:        Results from last 7 days   Lab Units 05/04/22  0320 05/03/22  0340 05/02/22  0329   SODIUM mmol/L 132* 129* 130*   POTASSIUM mmol/L 3.9 4.6 3.9   CHLORIDE mmol/L 99 96* 95*   CO2 mmol/L 19.0* 21.0* 22.3   BUN mg/dL 19 39* 26*   CREATININE mg/dL 2.93* 5.04* 4.23*   GLUCOSE mg/dL 206* 183* 119*   CALCIUM mg/dL 7.9* 7.9* 7.9*         Results from last 7 days   Lab Units 05/04/22  0320 05/03/22  0340 05/02/22  0329   WBC 10*3/mm3 17.72* 16.34* 10.55   HEMOGLOBIN g/dL 9.6* 10.0* 10.8*   HEMATOCRIT % 29.8* 30.7* 32.3*   PLATELETS 10*3/mm3 214 193 203     Results from last 7 days   Lab Units 04/29/22  2317 04/27/22  1615   INR  0.97 1.02   APTT seconds 29.4 28.2                       Results from last 7 days   Lab Units 04/27/22  1648   PH, ARTERIAL pH units 7.395   PCO2, ARTERIAL mm Hg 44.0   PO2 ART mm Hg 52.8*   MODALITY  Room Air   O2 SATURATION CALC % 86.6*         I reviewed the patient's new clinical results.        Medication Review:   amLODIPine, 10 mg, Oral, Q24H  atorvastatin, 40 mg, Oral, Nightly  cloNIDine, 0.1 mg, Oral, Q6H   Followed by  [START ON 5/5/2022] cloNIDine, 0.1 mg, Oral, Q8H   Followed by  [START ON 5/6/2022] cloNIDine, 0.1 mg, Oral, Q12H   Followed by  [START ON 5/8/2022] cloNIDine, 0.1 mg, Oral, Once  heparin (porcine), 3,000 Units, Intracatheter, Once  hydrALAZINE, 10 mg, Oral, Q8H  insulin lispro, 0-14 Units, Subcutaneous, Q6H  levothyroxine, 125 mcg, Oral, Q AM  OLANZapine, 10 mg, Oral, BID  pantoprazole, 40 mg, Intravenous, Q AM  rOPINIRole, 0.75 mg, Oral, Nightly  sertraline, 100 mg, Oral, Daily  sodium chloride, 10 mL, Intravenous, Q12H  tamsulosin, 0.4 mg, Oral, Daily  Vancomycin Pharmacy Intermittent/Pulse Dosing, , Does not apply, Daily        niCARdipine, 5-15 mg/hr, Last Rate: Stopped (05/04/22 0706)  Pharmacy Consult,   Pharmacy to dose vancomycin,         Diagnostic imaging:  I personally and independently  reviewed the following images:  CT brain 4/30/2022 compared to 4/29/2022: Slight enlargement in the left frontal hemorrhage.  No midline shift or mass-effect      CXR 5/1/2022: Increased pulmonary vascular markings.  Dialysis catheter in good position.      CT head 5/1/2022: No change in intracranial hemorrhage.    CXR 5/3/2022: Line in good place.  Increased vascular markings but no edema.    Assessment     1. Right MCA stroke syndrome (MRI negative for embolic/ischemic stroke), s/p TNKase  2. Spontaneous left frontal intracerebral hemorrhage  3. ESRD on HD TTS  4. Hemodialysis cath infection with MRSA, removed.  New Shiley catheter and right IJ inserted 5/1  5. Sepsis, resolved  6. Hyponatremia, clinically significant  7. Encephalopathy, metabolic  8. Pulmonary hypertension on echo 4/29/2022, RVSP 48.  Moderate to severely enlarged LA.  Grade 2 diastolic dysfunction.  9. Pulmonary vascular congestion/fluid overload, improved  10. Mild MR  11. Leukocytosis, worse      Pertinent medical problems:  · Bipolar disorder  · DM type II  · CAD  · Anemia of CKD  · HTN        Plan     · Amlodipine increased to 10 mg  · Continue clonidine taper  · ICU care.  Neuro check. EEG. Repeat MRI in 3 months  · Antibiotics with vancomycin.  Repeat blood culture x2 4/28 negative so far.  Echo showed no evidence of endocarditis.  fever resolved. ID recommending ANJEL.  Cardiology following for ANJEL when patient is more stable  · Hemodialysis per nephrology  · Speech evaluation l  · Off Aspirin Plavix due to bleed  · Insulin.  Accu-Chek  · Olanzapine 10 mg twice daily  · Plan to repeat MRI in 3 months and follow-up with neurology    Discussed with ICU staff during the multidisciplinary round.  Discussed Dr. Bull.    Disposition: Transfer to telemetry if patient remains off intravenous antihypertensive.    Alex Randall MD  05/04/22  15:16 EDT    Time>35 min face to face and on the roberson >1/2 directed toward counseling and coordination of  care          This note was dictated utilizing Dragon dictation

## 2022-05-04 NOTE — THERAPY TREATMENT NOTE
Patient Name: Zaina Martinez  : 1965    MRN: [id number]                              Today's Date: 2022       Admit Date: 2022    Visit Dx:     ICD-10-CM ICD-9-CM   1. Acute metabolic encephalopathy  G93.41 348.31   2. ESRD (end stage renal disease) on dialysis ([facility])  N18.6 585.6    Z99.2 V45.11   3. Acute hypoxemic respiratory failure ([facility])  J96.01 518.81   4. Hyperkalemia  E87.5 276.7   5. ESRD (end stage renal disease) ([facility])  N18.6 585.6     Patient Active Problem List   Diagnosis   • Renal insufficiency   • Hypertensive disorder   • Hypothyroidism   • Acute DM type 2 causing complication ([facility])   • Rheumatoid arthritis ([facility])   • Angioedema   • Esophageal dysmotility   • Anemia   • Medically noncompliant   • Myocardial infarction due to demand ischemia ([facility])   • Enteritis   • PRES (posterior reversible encephalopathy syndrome)   • Urine retention   • Klebsiella infection   • Superficial thrombophlebitis   • Generalized weakness   • ESRD (end stage renal disease) ([facility])   • CAD (coronary artery disease)   • Abnormal urinalysis   • Acute on chronic diastolic CHF (congestive heart failure) ([facility])   • Pyelonephritis   • Calculus of gallbladder with acute on chronic cholecystitis without obstruction   • Pleural effusion on right   • Anemia due to chronic kidney disease, on chronic dialysis ([facility])   • Abnormal findings on diagnostic imaging of other specified body structures   • Acute upper respiratory infection   • Agitation   • Alkaline phosphatase raised   • Casts present in urine   • Cellulitis of toe   • Hip pain   • Community acquired pneumonia   • Depressive disorder   • Diarrhea of presumed infectious origin   • Difficult or painful urination   • Disease due to severe acute respiratory syndrome coronavirus 2 (SARS-CoV-2)   • Dyspnea   • Encounter for follow-up examination after completed treatment for conditions other than malignant neoplasm   • H/O: hypothyroidism   • Hyperlipidemia   •  Hypomagnesemia   • Intractable vomiting with nausea   • Leukocytosis   • Luetscher's syndrome   • Need for influenza vaccination   • Restless legs   • Noncompliance with treatment   • Shoulder pain   • Urinary tract infectious disease   • Metabolic encephalopathy   • Abnormal findings on diagnostic imaging of abdomen   • Status post cholecystectomy   • Hyponatremia   • Leukocytosis   • Acute metabolic encephalopathy   • Encephalopathy, toxic   • Acute CVA (cerebrovascular accident) ([facility])   • Intracranial hemorrhage ([facility])     Past Medical History:   Diagnosis Date   • Acute CVA (cerebrovascular accident) ([facility]CC) 5/1/2022   • Acute on chronic diastolic CHF (congestive heart failure) ([facility])    • CAD (coronary artery disease) 12/20/2021   • Diabetes (HCC)    • Disease of thyroid gland    • GERD (gastroesophageal reflux disease)    • Hyperlipidemia 11/30/2018   • Hypertension    • Rheumatoid arthritis (HCC)      Past Surgical History:   Procedure Laterality Date   • CHOLECYSTECTOMY WITH INTRAOPERATIVE CHOLANGIOGRAM N/A 1/10/2022    Procedure: Laparoscopic cholecystectomy with intraoperative cholangiogram;  Surgeon: Ramana Raygoza MD;  Location: [facility];  Service: General;  Laterality: N/A;   • EYE SURGERY     • HYSTERECTOMY     • INSERTION HEMODIALYSIS CATHETER N/A 12/6/2021    Procedure: HEMODIALYSIS CATHETER INSERTION;  Surgeon: Keli Salazar MD;  Location: [facility] 18/19;  Service: Vascular;  Laterality: N/A;   • INSERTION HEMODIALYSIS CATHETER N/A 5/3/2022    Procedure: TUNNELED CATHETER PLACEMENT;  Surgeon: Keli Salazar MD;  Location: [facility];  Service: Vascular;  Laterality: N/A;      General Information     Row Name 05/04/22 1258          OT Time and Intention    Document Type therapy note (daily note)  -SM     Mode of Treatment occupational therapy;physical therapy;co-treatment  co treat 2/2 medical complexity and cogntive status to progress mobility  -SM     Row Name  05/04/22 1258          General Information    Patient Profile Reviewed yes  -     Existing Precautions/Restrictions fall  -     Barriers to Rehab medically complex  -     Row Name 05/04/22 1258          Cognition    Orientation Status (Cognition) oriented to;person;place  intermitten confusion but follow commands better today and less redirecting to task required, aphasia also limiting.  -[facility] Name 05/04/22 1258          Safety Issues, Functional Mobility    Safety Issues Affecting Function (Mobility) ability to follow commands;insight into deficits/self-awareness;at risk behavior observed;awareness of need for assistance;impulsivity;judgment;problem-solving;safety precaution awareness;sequencing abilities  -     Impairments Affecting Function (Mobility) balance;cognition;endurance/activity tolerance;postural/trunk control;strength;coordination;grasp;pain  -           User Key  (r) = Recorded By, (t) = Taken By, (c) = Cosigned By    Initials Name Provider Type     Tesha Charles OT Occupational Therapist                 Mobility/ADL's     Row Name 05/04/22 1259          Bed Mobility    Supine-Sit Porter (Bed Mobility) minimum assist (75% patient effort);2 person assist;verbal cues;nonverbal cues (demo/gesture)  -     Sit-Supine Porter (Bed Mobility) verbal cues;nonverbal cues (demo/gesture);maximum assist (25% patient effort);2 person assist  -     Assistive Device (Bed Mobility) draw sheet;head of bed elevated  -     Comment, (Bed Mobility) extra time and cues for sequencing steps  -[facility] Name 05/04/22 1259          Transfers    Transfers sit-stand transfer  -     Sit-Stand Porter (Transfers) maximum assist (25% patient effort);2 person assist;verbal cues;nonverbal cues (demo/gesture)  -[facility] Name 05/04/22 1259          Sit-Stand Transfer    Assistive Device (Sit-Stand Transfers) --  HHAX2  -     Comment, (Sit-Stand Transfer) OT/PT blocking knees and feet  from sliding, pt unable to come to full stand. Reports pain limiting and requesting to return to bed.  -     Row Name 05/04/22 1259          Activities of Daily Living    BADL Assessment/Intervention upper body dressing  -     Row Name 05/04/22 1259          Lower Body Dressing Assessment/Training    [place] Level (Lower Body Dressing) lower body dressing skills;don;socks;dependent (less than 25% patient effort)  -     Position (Lower Body Dressing) edge of bed sitting  -     Row Name 05/04/22 1259          Grooming Assessment/Training    [place] Level (Grooming) grooming skills;minimum assist (75% patient effort);oral care regimen;wash face, hands;set up  -     Position (Grooming) sitting up in bed  -     Comment, (Grooming) bilateral coordination and sequencing deficits noted during  -[facility] Name 05/04/22 1259          Upper Body Dressing Assessment/Training    [place] Level (Upper Body Dressing) upper body dressing skills;don;front opening garment;moderate assist (50% patient effort);verbal cues;nonverbal cues (demo/gesture)  -     Position (Upper Body Dressing) edge of bed sitting  -           User Key  (r) = Recorded By, (t) = Taken By, (c) = Cosigned By    Initials Name Provider Type     Tesha Charles OT Occupational Therapist               Obj/Interventions     Row Name 05/04/22 1301          Motor Skills    Motor Skills coordination  -     Coordination bilateral;upper extremity;minimal impairment  difficulty with opening containers, unsteady with movements during ADLs.  -     Row Name 05/04/22 1301          Balance    Balance Assessment sitting dynamic balance;standing static balance  -     Static Sitting Balance contact guard;maximum assist;verbal cues;non-verbal cues (demo/gesture)  -     Dynamic Sitting Balance minimal assist;maximum assist;verbal cues;non-verbal cues (demo/gesture)  -     Position, Sitting Balance sitting edge of bed  -     Static  "Standing Balance maximum assist;2-person assist;verbal cues;non-verbal cues (demo/gesture)  -     Balance Interventions dynamic reaching  -     Comment, Balance sitting balance varies throughout, at times CGA with cues for weight shifting forward, at times pushing back and requires max A but not as often as previous sessions. Pt able to follow cues better for sitting balance. Worked UE reaching X10 reps BUE in bed in all planes to work on sitting balance and weight shifting.  -           User Key  (r) = Recorded By, (t) = Taken By, (c) = Cosigned By    Initials Name Provider Type     Tesha Charles OT Occupational Therapist               Goals/Plan    No documentation.                Clinical Impression     Row Name 05/04/22 1305          Pain Assessment    Pre/Posttreatment Pain Comment c/o of R hip pain and limited tolerance to sitting or standing today. Pt also reports pain \"all over\" LUE and tender to any touch  -     Row Name 05/04/22 1301          Pain Scale: FACES Pre/Post-Treatment    Pain: FACES Scale, Pretreatment 2-->hurts little bit  -     Posttreatment Pain Rating 6-->hurts even more  -     Row Name 05/04/22 1302          Plan of Care Review    Plan of Care Reviewed With patient  -     Progress improving  -     Outcome Evaluation Pt showing improvements today and able to follow commands better but remains impulsive. She also is able to state that she is in the hospital and the name of this hospital today. She was able to work with OT today on sitting balance, ADL engagement sitting EOB and sitting up in bed, and UE reaching. X1 sit to stand completed with max AX2. Pt requires assist with all ADLs at present 2/2 cogntion, postural control, UE coordination, strength, pain. Co treat completed with PT today. With showing improvements she may benefit from acute rehab if continued to progress with therapies.  -     Row Name 05/04/22 2556          Therapy Assessment/Plan (OT)    Rehab " Potential (OT) good, to achieve stated therapy goals  -     Criteria for Skilled Therapeutic Interventions Met (OT) yes;skilled treatment is necessary  -     Therapy Frequency (OT) 5 times/wk  -     Row Name 05/04/22 1304          Therapy Plan Review/Discharge Plan (OT)    Anticipated Discharge Disposition (OT) inpatient rehabilitation facility;sub acute care setting  -     Row Name 05/04/22 1304          Positioning and Restraints    Pre-Treatment Position in bed  -SM     Post Treatment Position bed  -SM     In Bed fowlers;call light within reach;encouraged to call for assist;exit alarm on;notified nsg  -     Restraints reapplied:;soft limb  -           User Key  (r) = Recorded By, (t) = Taken By, (c) = Cosigned By    Initials Name Provider Type    Tesha Ardon OT Occupational Therapist               Outcome Measures     Row Name 05/04/22 1307          How much help from another is currently needed...    Putting on and taking off regular lower body clothing? 1  -SM     Bathing (including washing, rinsing, and drying) 2  -SM     Toileting (which includes using toilet bed pan or urinal) 1  -SM     Putting on and taking off regular upper body clothing 2  -SM     Taking care of personal grooming (such as brushing teeth) 3  -SM     Eating meals 3  -SM     AM-PAC 6 Clicks Score (OT) 12  -     Row Name 05/04/22 1307          Functional Assessment    Outcome Measure Options AM-PAC 6 Clicks Daily Activity (OT)  -SM           User Key  (r) = Recorded By, (t) = Taken By, (c) = Cosigned By    Initials Name Provider Type    Tesha Ardon OT Occupational Therapist                Occupational Therapy Education                 Title: PT OT SLP Therapies (In Progress)     Topic: Occupational Therapy (Not Started)     Point: ADL training (Not Started)     Description:   Instruct learner(s) on proper safety adaptation and remediation techniques during self care or transfers.   Instruct in proper use of  assistive devices.              Learner Progress:  Not documented in this visit.          Point: Home exercise program (Not Started)     Description:   Instruct learner(s) on appropriate technique for monitoring, assisting and/or progressing therapeutic exercises/activities.              Learner Progress:  Not documented in this visit.          Point: Precautions (Not Started)     Description:   Instruct learner(s) on prescribed precautions during self-care and functional transfers.              Learner Progress:  Not documented in this visit.          Point: Body mechanics (Not Started)     Description:   Instruct learner(s) on proper positioning and spine alignment during self-care, functional mobility activities and/or exercises.              Learner Progress:  Not documented in this visit.                          OT Recommendation and Plan  Planned Therapy Interventions (OT): activity tolerance training, adaptive equipment training, cognitive/visual perception retraining, BADL retraining, functional balance retraining, IADL retraining, neuromuscular control/coordination retraining, patient/caregiver education/training, occupation/activity based interventions, transfer/mobility retraining, ROM/therapeutic exercise, strengthening exercise, passive ROM/stretching  Therapy Frequency (OT): 5 times/wk  Plan of Care Review  Plan of Care Reviewed With: patient  Progress: improving  Outcome Evaluation: Pt showing improvements today and able to follow commands better but remains impulsive. She also is able to state that she is in the hospital and the name of this hospital today. She was able to work with OT today on sitting balance, ADL engagement sitting EOB and sitting up in bed, and UE reaching. X1 sit to stand completed with max AX2. Pt requires assist with all ADLs at present 2/2 cogntion, postural control, UE coordination, strength, pain. Co treat completed with PT today. With showing improvements she may benefit  from acute rehab if continued to progress with therapies.     Time Calculation:    Time Calculation- OT     Row Name 05/04/22 1308             Time Calculation- OT    OT Start Time 1119  -SM      OT Stop Time 1146  -SM      OT Time Calculation (min) 27 min  -SM      Total Timed Code Minutes- OT 27 minute(s)  -SM      OT Received On 05/04/22  -SM      OT - Next Appointment 05/05/22  -              Timed Charges    45323 - OT Therapeutic Activity Minutes 15  -SM      75104 - OT Self Care/Mgmt Minutes 13  -SM              Total Minutes    Timed Charges Total Minutes 28  -SM       Total Minutes 28  -SM            User Key  (r) = Recorded By, (t) = Taken By, (c) = Cosigned By    Initials Name Provider Type     Tesha Charles OT Occupational Therapist              Therapy Charges for Today     Code Description Service Date Service Provider Modifiers Qty    99035811575 HC OT THERAPEUTIC ACT EA 15 MIN 5/3/2022 Tesha Charles OT GO 1    05288322429 HC OT SELF CARE/MGMT/TRAIN EA 15 MIN 5/4/2022 Tesha Charles OT GO 1    24060152010 HC OT THERAPEUTIC ACT EA 15 MIN 5/4/2022 Tesha Charles OT GO 1               Tesha Charles OT  5/4/2022

## 2022-05-04 NOTE — PROGRESS NOTES
LOS: 7 days     Chief Complaint: Sepsis    Interval History: More interactive on exam today.  States she was having no fevers but felt very sweaty overnight.  Denies any nausea, vomiting, diarrhea.  Denies any abdominal pain.    T-max of 100.2 over the last 24 hours.  Blood cultures remain negative.  Increase leukocytosis to 17 today.     Vital Signs  Temp:  [97.8 °F (36.6 °C)-100.2 °F (37.9 °C)] 100.2 °F (37.9 °C)  Heart Rate:  [59-89] 61  BP: (102-160)/() 139/65    Physical Exam:  General: In no acute distress  Cardiovascular: RRR, no LE edema   Respiratory: Clear to ascultation bilaterally  GI: Soft, NT/ND, + bowel sounds bilaterally  Skin: No rashes.  New catheter site clean and intact.    Antibiotics:  Vancomycin dosing per pharmacy     Results Review:    Lab Results   Component Value Date    WBC 17.72 (H) 05/04/2022    HGB 9.6 (L) 05/04/2022    HCT 29.8 (L) 05/04/2022    MCV 84.7 05/04/2022     05/04/2022     Lab Results   Component Value Date    GLUCOSE 206 (H) 05/04/2022    BUN 19 05/04/2022    CREATININE 2.93 (H) 05/04/2022    EGFRIFNONA 14 (L) 02/28/2022    EGFRIFAFRI  01/13/2022      Comment:      <15 Indicative of kidney failure.    BCR 6.5 (L) 05/04/2022    CO2 19.0 (L) 05/04/2022    CALCIUM 7.9 (L) 05/04/2022    ALBUMIN 2.30 (L) 05/04/2022    AST 30 04/27/2022    ALT 14 04/27/2022       Microbiology:  4/27 urine culture <10K MRSA  4/28 blood cultures NGTD x 2  4/28 catheter tip culture from hemodialysis catheter with MRSA    Assessment/Plan   #Hemodialysis catheter related infection with MRSA, now removed  #Left frontal ICH  #Acute encephalopathy  #Type 2 diabetes  #ESRD on hemodialysis    Continue intermittent dosed IV vancomycin goal -600 for MRSA infection associated with her hemodialysis catheter. Blood cultures have remained negative and TTE was without any vegetation.  Appreciate cardiology's eval for ANJEL in the setting of possible embolic stroke in the setting of a MRSA  catheter infection.  Plan to repeat blood cultures today given her continued low-grade temperatures but I suspect this could partially be in relation to her intracranial hemorrhage and not necessarily uncontrolled bacteremia.  Tentative plan would be for 4 weeks of IV vancomycin with dialysis for hemodialysis catheter associated infection.    ID will follow.

## 2022-05-04 NOTE — PROGRESS NOTES
"[facility] Clinical Pharmacy Services: Clonidine Taper for Weaning Dexmedetomidine    Pharmacy has been consulted to start oral/enteral clonidine on Zaina Martinez to assist in weaning off dexmedetomidine per Dr. Randall's request. Dexmedetomidine withdrawal symptoms include tachycardia and increased agitation, so weaning will be done slowly with the goal of avoiding any withdrawal symptoms.     Relevant clinical data and objective history reviewed:  56 y.o. female 157.5 cm (62.01\") 57.2 kg (126 lb 1.7 oz)    Creatinine   Date Value Ref Range Status   05/04/2022 2.93 (H) 0.57 - 1.00 mg/dL Final   05/03/2022 5.04 (H) 0.57 - 1.00 mg/dL Final   05/02/2022 4.23 (H) 0.57 - 1.00 mg/dL Final     BUN   Date Value Ref Range Status   05/04/2022 19 6 - 20 mg/dL Final     Estimated Creatinine Clearance: 19.4 mL/min (A) (by C-G formula based on SCr of 2.93 mg/dL (H)).    Assessment    Dexmedetomidine stopped on 5/4 @ 0530. Patient tolerating conversion to clonidine.    Rass: RASS (Avila Agitation-Sedation Scale): -1-->drowsy  CPOT: CPOT Score: 0    Plan    1. Now that patient is completely off of dexmedetomidine, will wait 24 hrs and begin to taper off of clonidine beginning 5/5 @ 0600.   2. Will plan to decrease clonidine interval using following dosing:  a. 0.1 mg q6h x 3 doses 5/4-5/4  b. 0.1 mg q8h x 3 doses 5/5-5/6  c. 0.1 mg q12h x 3 doses 5/6-5/7  d. 0.1 mg once on 5/8 @1200 last dose  3. If patient becomes hypotensive, can consider adjustment of the clonidine taper due to patient also being on amlodipine per Dr. Lira.  4. Pharmacy will assess patient every 24 hrs for hypotension, signs of agitation, uncontrolled pain, toleration of clonidine (skipped or held doses), and PRN medications used to treat agitation.    Thank you for allowing me to participate in your patient's care. Please call pharmacy with any questions or concerns.    Reji Lopez, PharmD  Pharmacy Resident  05/04/22 11:08 EDT          "

## 2022-05-04 NOTE — PROGRESS NOTES
[facility] Progress Note      Patient Name: Zaina Martinez  : 1965  MRN: [id number]  Primary Care Physician:  Karson Oreilly MD  Date of admission: 2022    Subjective     Interval History:   Follow up ESRD.   Review of Systems:   As noted above    Objective     Vitals:   Temp:  [97.8 °F (36.6 °C)-100.2 °F (37.9 °C)] 100.2 °F (37.9 °C)  Heart Rate:  [59-89] 65  BP: (102-160)/() 143/67    Intake/Output Summary (Last 24 hours) at 2022 1121  Last data filed at 2022 0800  Gross per 24 hour   Intake 2434.5 ml   Output 3600 ml   Net -1165.5 ml       Physical Exam:   General: Awake, restless, confused.  In restraints.  Does answer questions yes and no.    Skin:dry. No rash  HEENT: Cortrak. Nasal 02.  Oral mucosa dry  Neck:RIJ TDC  Lungs:clear to auscultation. Unlabored.  Heart:RRR no s3 or rub  Abdomen: + bs,soft, nontender   Extremities: trace lower ext edema.    Neuro: not oriented. Restless.  Moves all 4 ext.     Scheduled Meds:     amLODIPine, 10 mg, Oral, Q24H  atorvastatin, 40 mg, Oral, Nightly  cloNIDine, 0.1 mg, Nasogastric, Q6H  heparin (porcine), 3,000 Units, Intracatheter, Once  hydrALAZINE, 10 mg, Oral, Q8H  insulin lispro, 0-14 Units, Subcutaneous, Q6H  levothyroxine, 125 mcg, Oral, Q AM  OLANZapine, 10 mg, Oral, BID  pantoprazole, 40 mg, Intravenous, Q AM  rOPINIRole, 0.75 mg, Oral, Nightly  sertraline, 100 mg, Oral, Daily  sodium chloride, 10 mL, Intravenous, Q12H  tamsulosin, 0.4 mg, Oral, Daily  Vancomycin Pharmacy Intermittent/Pulse Dosing, , Does not apply, Daily      IV Meds:   niCARdipine, 5-15 mg/hr, Last Rate: Stopped (22 0706)  Pharmacy Consult,   Pharmacy to dose vancomycin,         Results Reviewed:   I have personally reviewed the results from the time of this admission to 2022 11:21 EDT     Results from last 7 days   Lab Units 22  0320 22  0340 22  0329 22  0434 22  1615   SODIUM mmol/L 132* 129*  130*   < > 132*   POTASSIUM mmol/L 3.9 4.6 3.9   < > 6.7*   CHLORIDE mmol/L 99 96* 95*   < > 94*   CO2 mmol/L 19.0* 21.0* 22.3   < > 24.1   BUN mg/dL 19 39* 26*   < > 57*   CREATININE mg/dL 2.93* 5.04* 4.23*   < > 5.37*   CALCIUM mg/dL 7.9* 7.9* 7.9*   < > 9.0   BILIRUBIN mg/dL  --   --   --   --  0.9   ALK PHOS U/L  --   --   --   --  140*   ALT (SGPT) U/L  --   --   --   --  14   AST (SGOT) U/L  --   --   --   --  30   GLUCOSE mg/dL 206* 183* 119*   < > 178*    < > = values in this interval not displayed.       Estimated Creatinine Clearance: 19.4 mL/min (A) (by C-G formula based on SCr of 2.93 mg/dL (H)).    Results from last 7 days   Lab Units 05/04/22 0320 05/03/22  0340 05/02/22  0329 04/29/22  0332 04/28/22  0434   MAGNESIUM mg/dL  --  1.8  --   --  1.7   PHOSPHORUS mg/dL 2.6 3.7 3.2   < > 2.6    < > = values in this interval not displayed.             Results from last 7 days   Lab Units 05/04/22  0320 05/03/22  0340 05/02/22 0329 05/01/22  1050 04/29/22  2317   WBC 10*3/mm3 17.72* 16.34* 10.55 12.18* 17.03*   HEMOGLOBIN g/dL 9.6* 10.0* 10.8* 10.1* 11.0*   PLATELETS 10*3/mm3 214 193 203 172 150       Results from last 7 days   Lab Units 04/29/22 2317 04/27/22  1615   INR  0.97 1.02       Assessment / Plan     ASSESSMENT:  1.    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 Dialysis tomorrow. using new TDC.  2.  Intracerebral bleed and altered mental status.  3.  Infected TDC, s/p removal 5/1. Staph aureus . On vanc. ID note reviewed. WBC up a little.    4.  DM2    5.  Coronary artery disease  6.  Acute on chronic diastolic dysfunction . Remove volume with HD today.   7.  Anemia of CKD, on long-acting ANDRAE as an outpatient.  8.  Hypertension. Off cardene.  Remove volume with HD tomorrow.     PLAN:  1.  HD tomorrow.     Ruth Lira MD  05/04/22  11:21 EDT    [facility]  [phone]      Much of this encounter note is an electronic transcription/translation  of spoken language to printed text. The electronic translation of spoken language may permit erroneous, or at times, nonsensical words or phrases to be inadvertently transcribed; Although I have reviewed the note for such errors, some may still exist.

## 2022-05-05 LAB
ALBUMIN SERPL-MCNC: 2.8 G/DL (ref 3.5–5.2)
ANION GAP SERPL CALCULATED.3IONS-SCNC: 11 MMOL/L (ref 5–15)
BUN SERPL-MCNC: 32 MG/DL (ref 6–20)
BUN/CREAT SERPL: 8.3 (ref 7–25)
CALCIUM SPEC-SCNC: 7.8 MG/DL (ref 8.6–10.5)
CHLORIDE SERPL-SCNC: 95 MMOL/L (ref 98–107)
CK SERPL-CCNC: 97 U/L (ref 20–180)
CO2 SERPL-SCNC: 25 MMOL/L (ref 22–29)
CREAT SERPL-MCNC: 3.85 MG/DL (ref 0.57–1)
DEPRECATED RDW RBC AUTO: 53 FL (ref 37–54)
EGFRCR SERPLBLD CKD-EPI 2021: 13.1 ML/MIN/1.73
ERYTHROCYTE [DISTWIDTH] IN BLOOD BY AUTOMATED COUNT: 17 % (ref 12.3–15.4)
GLUCOSE BLDC GLUCOMTR-MCNC: 103 MG/DL (ref 70–130)
GLUCOSE BLDC GLUCOMTR-MCNC: 143 MG/DL (ref 70–130)
GLUCOSE BLDC GLUCOMTR-MCNC: 174 MG/DL (ref 70–130)
GLUCOSE BLDC GLUCOMTR-MCNC: 183 MG/DL (ref 70–130)
GLUCOSE SERPL-MCNC: 166 MG/DL (ref 65–99)
HCT VFR BLD AUTO: 24.3 % (ref 34–46.6)
HGB BLD-MCNC: 7.9 G/DL (ref 12–15.9)
MCH RBC QN AUTO: 27.1 PG (ref 26.6–33)
MCHC RBC AUTO-ENTMCNC: 32.5 G/DL (ref 31.5–35.7)
MCV RBC AUTO: 83.5 FL (ref 79–97)
PHOSPHATE SERPL-MCNC: 3.3 MG/DL (ref 2.5–4.5)
PLATELET # BLD AUTO: 245 10*3/MM3 (ref 140–450)
PMV BLD AUTO: 11.6 FL (ref 6–12)
POTASSIUM SERPL-SCNC: 3.8 MMOL/L (ref 3.5–5.2)
RBC # BLD AUTO: 2.91 10*6/MM3 (ref 3.77–5.28)
SODIUM SERPL-SCNC: 131 MMOL/L (ref 136–145)
WBC NRBC COR # BLD: 11.38 10*3/MM3 (ref 3.4–10.8)

## 2022-05-05 PROCEDURE — 80069 RENAL FUNCTION PANEL: CPT | Performed by: INTERNAL MEDICINE

## 2022-05-05 PROCEDURE — 63710000001 INSULIN LISPRO (HUMAN) PER 5 UNITS: Performed by: INTERNAL MEDICINE

## 2022-05-05 PROCEDURE — 87040 BLOOD CULTURE FOR BACTERIA: CPT | Performed by: INTERNAL MEDICINE

## 2022-05-05 PROCEDURE — 99232 SBSQ HOSP IP/OBS MODERATE 35: CPT | Performed by: STUDENT IN AN ORGANIZED HEALTH CARE EDUCATION/TRAINING PROGRAM

## 2022-05-05 PROCEDURE — 82962 GLUCOSE BLOOD TEST: CPT

## 2022-05-05 PROCEDURE — 5A1D70Z PERFORMANCE OF URINARY FILTRATION, INTERMITTENT, LESS THAN 6 HOURS PER DAY: ICD-10-PCS | Performed by: STUDENT IN AN ORGANIZED HEALTH CARE EDUCATION/TRAINING PROGRAM

## 2022-05-05 PROCEDURE — 82550 ASSAY OF CK (CPK): CPT | Performed by: INTERNAL MEDICINE

## 2022-05-05 PROCEDURE — 25010000002 VANCOMYCIN PER 500 MG: Performed by: STUDENT IN AN ORGANIZED HEALTH CARE EDUCATION/TRAINING PROGRAM

## 2022-05-05 PROCEDURE — 25010000002 HEPARIN (PORCINE) PER 1000 UNITS: Performed by: INTERNAL MEDICINE

## 2022-05-05 PROCEDURE — 85027 COMPLETE CBC AUTOMATED: CPT | Performed by: INTERNAL MEDICINE

## 2022-05-05 RX ADMIN — HEPARIN SODIUM 3800 UNITS: 1000 INJECTION INTRAVENOUS; SUBCUTANEOUS at 11:57

## 2022-05-05 RX ADMIN — ACETAMINOPHEN 650 MG: 325 TABLET, FILM COATED ORAL at 18:06

## 2022-05-05 RX ADMIN — AMLODIPINE BESYLATE 10 MG: 10 TABLET ORAL at 12:46

## 2022-05-05 RX ADMIN — INSULIN LISPRO 2 UNITS: 100 INJECTION, SOLUTION INTRAVENOUS; SUBCUTANEOUS at 06:41

## 2022-05-05 RX ADMIN — LEVOTHYROXINE SODIUM 125 MCG: 0.12 TABLET ORAL at 06:41

## 2022-05-05 RX ADMIN — Medication 10 ML: at 12:46

## 2022-05-05 RX ADMIN — SERTRALINE 100 MG: 100 TABLET, FILM COATED ORAL at 12:46

## 2022-05-05 RX ADMIN — ACETAMINOPHEN 650 MG: 325 TABLET, FILM COATED ORAL at 03:39

## 2022-05-05 RX ADMIN — ROPINIROLE HYDROCHLORIDE 0.75 MG: 0.5 TABLET, FILM COATED ORAL at 21:18

## 2022-05-05 RX ADMIN — HYDRALAZINE HYDROCHLORIDE 10 MG: 10 TABLET, FILM COATED ORAL at 16:59

## 2022-05-05 RX ADMIN — VANCOMYCIN HYDROCHLORIDE 500 MG: 500 INJECTION, POWDER, LYOPHILIZED, FOR SOLUTION INTRAVENOUS at 17:06

## 2022-05-05 RX ADMIN — TAMSULOSIN HYDROCHLORIDE 0.4 MG: 0.4 CAPSULE ORAL at 12:46

## 2022-05-05 RX ADMIN — HYDRALAZINE HYDROCHLORIDE 10 MG: 10 TABLET, FILM COATED ORAL at 06:41

## 2022-05-05 RX ADMIN — INSULIN LISPRO 3 UNITS: 100 INJECTION, SOLUTION INTRAVENOUS; SUBCUTANEOUS at 17:30

## 2022-05-05 RX ADMIN — Medication 3 MG: at 21:24

## 2022-05-05 RX ADMIN — PANTOPRAZOLE SODIUM 40 MG: 40 INJECTION, POWDER, FOR SOLUTION INTRAVENOUS at 06:41

## 2022-05-05 RX ADMIN — ACETAMINOPHEN 650 MG: 325 TABLET, FILM COATED ORAL at 12:46

## 2022-05-05 RX ADMIN — HYDRALAZINE HYDROCHLORIDE 10 MG: 10 TABLET, FILM COATED ORAL at 21:18

## 2022-05-05 RX ADMIN — Medication 10 ML: at 21:24

## 2022-05-05 RX ADMIN — CLONIDINE HYDROCHLORIDE 0.1 MG: 0.1 TABLET ORAL at 06:41

## 2022-05-05 NOTE — PROGRESS NOTES
[facility] Progress Note      Patient Name: Zaina Martinez  : 1965  MRN: [id number]  Primary Care Physician:  Karson Oreilly MD  Date of admission: 2022    Subjective     Interval History:   Follow up ESRD. Dialysis this am.  Feels sore all over. Eating. Bowels moving. Not soa.     Review of Systems:   As noted above    Objective     Vitals:   Temp:  [97.3 °F (36.3 °C)-97.5 °F (36.4 °C)] 97.3 °F (36.3 °C)  Heart Rate:  [51-66] 66  Resp:  [16] 16  BP: ()/(57-73) 149/73    Intake/Output Summary (Last 24 hours) at 2022 1328  Last data filed at 2022 1154  Gross per 24 hour   Intake --   Output 3000 ml   Net -3000 ml       Physical Exam:   General: Awake, fatigued.     Does answer appropriately.    Skin:dry. No rash  HEENT: . Nasal 02.  Oral mucosa dry  Neck:RIJ TDC  Lungs:clear to auscultation. Unlabored.    Heart:RRR no s3 or rub  Abdomen: + bs,soft, nontender   Extremities: trace lower ext edema.    Neuro: Calm. Speech fluent.   Moves all 4 ext.     Scheduled Meds:     amLODIPine, 10 mg, Oral, Q24H  atorvastatin, 40 mg, Oral, Nightly  cloNIDine, 0.1 mg, Oral, Q8H   Followed by  [START ON 2022] cloNIDine, 0.1 mg, Oral, Q12H   Followed by  [START ON 2022] cloNIDine, 0.1 mg, Oral, Once  heparin (porcine), 3,000 Units, Intracatheter, Once  hydrALAZINE, 10 mg, Oral, Q8H  insulin lispro, 0-14 Units, Subcutaneous, Q6H  levothyroxine, 125 mcg, Oral, Q AM  pantoprazole, 40 mg, Intravenous, Q AM  rOPINIRole, 0.75 mg, Oral, Nightly  sertraline, 100 mg, Oral, Daily  sodium chloride, 10 mL, Intravenous, Q12H  tamsulosin, 0.4 mg, Oral, Daily  vancomycin, 500 mg, Intravenous, Once  Vancomycin Pharmacy Intermittent/Pulse Dosing, , Does not apply, Daily      IV Meds:   Pharmacy Consult,   Pharmacy to dose vancomycin,         Results Reviewed:   I have personally reviewed the results from the time of this admission to 2022 13:28 EDT     Results from last 7 days   Lab  Units 05/05/22 0557 05/04/22  0320 05/03/22  0340   SODIUM mmol/L 131* 132* 129*   POTASSIUM mmol/L 3.8 3.9 4.6   CHLORIDE mmol/L 95* 99 96*   CO2 mmol/L 25.0 19.0* 21.0*   BUN mg/dL 32* 19 39*   CREATININE mg/dL 3.85* 2.93* 5.04*   CALCIUM mg/dL 7.8* 7.9* 7.9*   GLUCOSE mg/dL 166* 206* 183*       Estimated Creatinine Clearance: 11.9 mL/min (A) (by C-G formula based on SCr of 3.85 mg/dL (H)).    Results from last 7 days   Lab Units 05/05/22 0557 05/04/22 0320 05/03/22  0340   MAGNESIUM mg/dL  --   --  1.8   PHOSPHORUS mg/dL 3.3 2.6 3.7             Results from last 7 days   Lab Units 05/05/22 0557 05/04/22 0320 05/03/22 0340 05/02/22  0329 05/01/22  1050   WBC 10*3/mm3 11.38* 17.72* 16.34* 10.55 12.18*   HEMOGLOBIN g/dL 7.9* 9.6* 10.0* 10.8* 10.1*   PLATELETS 10*3/mm3 245 214 193 203 172       Results from last 7 days   Lab Units 04/29/22  2317   INR  0.97       Assessment / Plan     ASSESSMENT:  1.    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 Dialysis today complete.    2.  Intracerebral bleed and altered mental status.  MS improving.   3.  Infected TDC, s/p removal 5/1. Staph aureus . On vanc. ID note reviewed. WBC better.    4.  DM2    5.  Coronary artery disease  6.  Acute on chronic diastolic dysfunction . 3 kg off with HD this am.  BP better.   7.  Anemia of CKD, on long-acting ANDRAE as an outpatient.  8. Myalgias.  Check CPK. Hold statin today .    PLAN:  1.  HD TTS  2. Hold statin and check CPK on blood in lab.     Ruth Lira MD  05/05/22  13:28 EDT    [facility]  [phone]

## 2022-05-05 NOTE — PLAN OF CARE
Goal Outcome Evaluation:  Plan of Care Reviewed With: patient        Progress: improving  Outcome Evaluation: Pt was in dialysis all AM.  Returned to unit this afternoon.  VSS. A&Ox4. NIH-3.  IV vanc started this shift.  Spoke to daughter on the phone to update her. Will monitor.

## 2022-05-05 NOTE — PROGRESS NOTES
LOS: 8 days     Chief Complaint: Sepsis    Interval History: Patient seen in dialysis this morning.  She reports she is feeling overall fatigue and achiness.  Denies any fevers or chills.  Denies any nausea, vomiting, diarrhea.    Fever curve overall improving.  Blood cultures remain negative.  Leukocytosis improved to 11 today.    Vital Signs  Temp:  [97.3 °F (36.3 °C)-97.9 °F (36.6 °C)] 97.3 °F (36.3 °C)  Heart Rate:  [51-82] 66  Resp:  [16] 16  BP: ()/(57-92) 149/73    Physical Exam:  General: In no acute distress  Cardiovascular: RRR, no LE edema   Respiratory: Clear to ascultation bilaterally  GI: Soft, NT/ND, + bowel sounds bilaterally  Skin: No rashes.  New catheter site clean and intact.    Antibiotics:  Vancomycin dosing per pharmacy     Results Review:    Lab Results   Component Value Date    WBC 11.38 (H) 05/05/2022    HGB 7.9 (L) 05/05/2022    HCT 24.3 (L) 05/05/2022    MCV 83.5 05/05/2022     05/05/2022     Lab Results   Component Value Date    GLUCOSE 166 (H) 05/05/2022    BUN 32 (H) 05/05/2022    CREATININE 3.85 (H) 05/05/2022    EGFRIFNONA 14 (L) 02/28/2022    EGFRIFAFRI  01/13/2022      Comment:      <15 Indicative of kidney failure.    BCR 8.3 05/05/2022    CO2 25.0 05/05/2022    CALCIUM 7.8 (L) 05/05/2022    ALBUMIN 2.80 (L) 05/05/2022    AST 30 04/27/2022    ALT 14 04/27/2022       Microbiology:  4/27 urine culture <10K MRSA  4/28 blood cultures NGTD x 2  4/28 catheter tip culture from hemodialysis catheter with MRSA  5/5 blood cultures in process    Assessment/Plan   #Hemodialysis catheter related infection with MRSA, now removed  #Left frontal ICH  #Acute encephalopathy  #Type 2 diabetes  #ESRD on hemodialysis    Continue intermittent dosed IV vancomycin goal -600 for MRSA infection associated with her hemodialysis catheter. Appreciate cardiology's eval for ANJEL.  Blood cultures remain negative to date.  Tentative plan would be for 4 weeks of IV vancomycin with dialysis for  hemodialysis catheter associated infection but may need to be extended depending on what is seen with ANJEL.    ID will follow.

## 2022-05-05 NOTE — PLAN OF CARE
Goal Outcome Evaluation:              Outcome Evaluation: Patient transfered from ICU to the 5th floor.  HD scheduled for today and orders called by ICU nurse.  GEORGIA GALLARDO.

## 2022-05-05 NOTE — PROGRESS NOTES
"DAILY PROGRESS NOTE  [facility]    Patient Identification:  Name: Zaina Martinez  Age: 56 y.o.  Sex: female  :  1965  MRN: [id number]         Primary Care Physician: Karson Oreilly MD    Subjective:  Interval History: She is weak.  More alert today. Talking more.    Objective:    Scheduled Meds:amLODIPine, 10 mg, Oral, Q24H  heparin (porcine), 3,000 Units, Intracatheter, Once  hydrALAZINE, 10 mg, Oral, Q8H  insulin lispro, 0-14 Units, Subcutaneous, Q6H  levothyroxine, 125 mcg, Oral, Q AM  pantoprazole, 40 mg, Intravenous, Q AM  rOPINIRole, 0.75 mg, Oral, Nightly  sertraline, 100 mg, Oral, Daily  sodium chloride, 10 mL, Intravenous, Q12H  tamsulosin, 0.4 mg, Oral, Daily  vancomycin, 500 mg, Intravenous, Once  Vancomycin Pharmacy Intermittent/Pulse Dosing, , Does not apply, Daily      Continuous Infusions:Pharmacy to dose vancomycin,         Vital signs in last 24 hours:  Temp:  [97.3 °F (36.3 °C)-97.8 °F (36.6 °C)] 97.8 °F (36.6 °C)  Heart Rate:  [51-74] 74  Resp:  [16] 16  BP: ()/(57-79) 173/79    Intake/Output:    Intake/Output Summary (Last 24 hours) at 2022 1717  Last data filed at 2022 1300  Gross per 24 hour   Intake 210 ml   Output 3000 ml   Net -2790 ml       Exam:  /79 (BP Location: Right arm, Patient Position: Lying)   Pulse 74   Temp 97.8 °F (36.6 °C) (Oral)   Resp 16   Ht 157.5 cm (62.01\")   Wt 46.2 kg (101 lb 13.6 oz)   SpO2 94%   BMI 18.62 kg/m²     General Appearance:    Alert, cooperative, no distress   Head:    Normocephalic, without obvious abnormality, atraumatic   Eyes:       Throat:   Lips, tongue, gums normal   Neck:   Supple, symmetrical, trachea midline, no JVD   Lungs:     Clear to auscultation bilaterally, respirations unlabored   Chest Wall:    No tenderness or deformity    Heart:    Regular rate and rhythm, S1 and S2 normal, no murmur,no  Rub or gallop   Abdomen:     Soft, nontender, bowel sounds active, no masses, no organomegaly  "   Extremities:   Extremities normal, atraumatic, no cyanosis or edema   Pulses:      Skin:   Skin is warm and dry,  no rashes or palpable lesions   Neurologic:  She is weak from stroke and not talking much. less confused      Lab Results (last 72 hours)     Procedure Component Value Units Date/Time    POC Glucose Once [[id number]]  (Normal) Collected: 04/30/22 1308    Specimen: Blood Updated: 04/30/22 1309     Glucose 80 mg/dL      Comment: Meter: [id number] : [id number] Chon Fernández RN       Blood Culture - Blood, Hand, Left [[id number]]  (Normal) Collected: 04/28/22 1113    Specimen: Blood from Hand, Left Updated: 04/30/22 1131     Blood Culture No growth at 2 days    Blood Culture - Blood, Arm, Right [[id number]]  (Normal) Collected: 04/28/22 1102    Specimen: Blood from Arm, Right Updated: 04/30/22 1130     Blood Culture No growth at 2 days    POC Glucose Once [[id number]]  (Normal) Collected: 04/30/22 0622    Specimen: Blood Updated: 04/30/22 0623     Glucose 87 mg/dL      Comment: Meter: [id number] : [id number] Phaniam Garcia NA       Catheter Culture - Cath Tip, Neck [[id number]]  (Abnormal)  (Susceptibility) Collected: 04/28/22 1438    Specimen: Cath Tip from Neck Updated: 04/30/22 0619     CATHETER CULTURE >15 CFU/mL - Indicative of infection. Staphylococcus aureus, MRSA     Comment: Methicillin resistant Staphylococcus aureus, Patient may be an isolation risk.       Susceptibility      Staphylococcus aureus, MRSA      ARSENIO      Gentamicin Susceptible      Oxacillin Resistant     Rifampin Susceptible      Vancomycin Susceptible                    Linear View               Susceptibility Comments     Staphylococcus aureus, MRSA    This isolate does not demonstrate inducible clindamycin resistance in vitro.               Renal Function Panel [[id number]]  (Abnormal) Collected: 04/30/22 0327    Specimen: Blood Updated: 04/30/22 0415     Glucose 94 mg/dL      BUN 36 mg/dL      Creatinine 4.89 mg/dL      Sodium  130 mmol/L      Potassium 4.9 mmol/L      Chloride 93 mmol/L      CO2 22.0 mmol/L      Calcium 8.0 mg/dL      Albumin 3.20 g/dL      Phosphorus 3.8 mg/dL      Anion Gap 15.0 mmol/L      BUN/Creatinine Ratio 7.4     eGFR 9.9 mL/min/1.73      Comment: <15 Indicative of kidney failure       Narrative:      GFR Normal >60  Chronic Kidney Disease <60  Kidney Failure <15      Vancomycin, Random [[id number]]  (Normal) Collected: 04/30/22 0327    Specimen: Blood Updated: 04/30/22 0415     Vancomycin Random 12.70 mcg/mL     Narrative:      Therapeutic Ranges for Vancomycin    Vancomycin Random   5.0-40.0 mcg/mL  Vancomycin Trough   5.0-20.0 mcg/mL  Vancomycin Peak     20.0-40.0 mcg/mL    Lipid Panel [[id number]] Collected: 04/30/22 0327    Specimen: Blood Updated: 04/30/22 0415     Total Cholesterol 145 mg/dL      Triglycerides 119 mg/dL      HDL Cholesterol 44 mg/dL      LDL Cholesterol  80 mg/dL      VLDL Cholesterol 21 mg/dL      LDL/HDL Ratio 1.75    Narrative:      Cholesterol Reference Ranges  (U.S. Department of Health and Human Services ATP III Classifications)    Desirable          <200 mg/dL  Borderline High    200-239 mg/dL  High Risk          >240 mg/dL      Triglyceride Reference Ranges  (U.S. Department of Health and Human Services ATP III Classifications)    Normal           <150 mg/dL  Borderline High  150-199 mg/dL  High             200-499 mg/dL  Very High        >500 mg/dL    HDL Reference Ranges  (U.S. Department of Health and Human Services ATP III Classifications)    Low     <40 mg/dl (major risk factor for CHD)  High    >60 mg/dl ('negative' risk factor for CHD)        LDL Reference Ranges  (U.S. Department of Health and Human Services ATP III Classifications)    Optimal          <100 mg/dL  Near Optimal     100-129 mg/dL  Borderline High  130-159 mg/dL  High             160-189 mg/dL  Very High        >189 mg/dL    Fibrinogen [[id number]]  (Abnormal) Collected: 04/30/22 0327    Specimen: Blood Updated:  04/30/22 0412     Fibrinogen 494 mg/dL     POC Glucose Once [[id number]]  (Abnormal) Collected: 04/30/22 0018    Specimen: Blood Updated: 04/30/22 0019     Glucose 133 mg/dL      Comment: Meter: [id number] : [id number] Emilie SAAVEDRA       Fibrinogen [[id number]]  (Normal) Collected: 04/29/22 2317    Specimen: Blood Updated: 04/29/22 2339     Fibrinogen 381 mg/dL     Protime-INR [[id number]]  (Normal) Collected: 04/29/22 2317    Specimen: Blood Updated: 04/29/22 2339     Protime 12.8 Seconds      INR 0.97    aPTT [[id number]]  (Normal) Collected: 04/29/22 2317    Specimen: Blood Updated: 04/29/22 2339     PTT 29.4 seconds     CBC & Differential [[id number]]  (Abnormal) Collected: 04/29/22 2317    Specimen: Blood Updated: 04/29/22 2325    Narrative:      The following orders were created for panel order CBC & Differential.  Procedure                               Abnormality         Status                     ---------                               -----------         ------                     CBC Auto Differential[[id number]]        Abnormal            Final result                 Please view results for these tests on the individual orders.    CBC Auto Differential [[id number]]  (Abnormal) Collected: 04/29/22 2317    Specimen: Blood Updated: 04/29/22 2325     WBC 17.03 10*3/mm3      RBC 3.88 10*6/mm3      Hemoglobin 11.0 g/dL      Hematocrit 31.9 %      MCV 82.2 fL      MCH 28.4 pg      MCHC 34.5 g/dL      RDW 18.4 %      RDW-SD 54.2 fl      MPV 10.4 fL      Platelets 150 10*3/mm3      Neutrophil % 86.7 %      Lymphocyte % 5.9 %      Monocyte % 6.3 %      Eosinophil % 0.2 %      Basophil % 0.3 %      Immature Grans % 0.6 %      Neutrophils, Absolute 14.78 10*3/mm3      Lymphocytes, Absolute 1.00 10*3/mm3      Monocytes, Absolute 1.07 10*3/mm3      Eosinophils, Absolute 0.03 10*3/mm3      Basophils, Absolute 0.05 10*3/mm3      Immature Grans, Absolute 0.10 10*3/mm3      nRBC 0.0 /100 WBC     POC Glucose Once  [[id number]]  (Normal) Collected: 04/29/22 2108    Specimen: Blood Updated: 04/29/22 2109     Glucose 79 mg/dL      Comment: Meter: [id number] : [id number] Karon Cervantes RN       POC Glucose Once [[id number]]  (Normal) Collected: 04/29/22 1641    Specimen: Blood Updated: 04/29/22 1642     Glucose 72 mg/dL      Comment: Meter: [id number] : [id number] Tyron Ornelas RN       POC Glucose Once [[id number]]  (Normal) Collected: 04/29/22 1525    Specimen: Blood Updated: 04/29/22 1527     Glucose 74 mg/dL      Comment: Meter: [id number] : [id number] Tiago Navarro CNA       POC Glucose Once [[id number]]  (Abnormal) Collected: 04/29/22 1109    Specimen: Blood Updated: 04/29/22 1110     Glucose 170 mg/dL      Comment: Meter: [id number] : [id number] Tiago Navarro CNA       Urine Culture - Urine, Urine, Catheter [[id number]]  (Abnormal) Collected: 04/27/22 1712    Specimen: Urine, Catheter Updated: 04/29/22 0946     Urine Culture <10,000 CFU/mL Staphylococcus aureus, MRSA     Comment: Call if further workup needed.    Methicillin resistant Staph aureus, patient may be an isolation risk.  Staphylococcus aureus is not typically regarded as a uropathogen, except in cases with genitourinary instrumentation present.  It's presence suggests an alternate source (e.g. bloodstream, abscess, SSTI, etc.).  Please evaluate accordingly.       POC Glucose Once [[id number]]  (Normal) Collected: 04/29/22 0607    Specimen: Blood Updated: 04/29/22 0609     Glucose 129 mg/dL      Comment: Meter: [id number] : [id number] Hakeem Santacruz RN       Renal Function Panel [[id number]]  (Abnormal) Collected: 04/29/22 0332    Specimen: Blood Updated: 04/29/22 0445     Glucose 179 mg/dL      BUN 32 mg/dL      Creatinine 4.12 mg/dL      Sodium 132 mmol/L      Potassium 4.3 mmol/L      Chloride 95 mmol/L      CO2 26.2 mmol/L      Calcium 8.4 mg/dL      Albumin 2.90 g/dL      Phosphorus 3.7 mg/dL      Anion Gap 10.8 mmol/L      BUN/Creatinine Ratio 7.8      eGFR 12.1 mL/min/1.73      Comment: <15 Indicative of kidney failure       Narrative:      GFR Normal >60  Chronic Kidney Disease <60  Kidney Failure <15      CBC & Differential [[id number]]  (Abnormal) Collected: 04/29/22 0332    Specimen: Blood Updated: 04/29/22 0353    Narrative:      The following orders were created for panel order CBC & Differential.  Procedure                               Abnormality         Status                     ---------                               -----------         ------                     CBC Auto Differential[[id number]]        Abnormal            Final result                 Please view results for these tests on the individual orders.    CBC Auto Differential [[id number]]  (Abnormal) Collected: 04/29/22 0332    Specimen: Blood Updated: 04/29/22 0353     WBC 14.34 10*3/mm3      RBC 3.83 10*6/mm3      Hemoglobin 10.5 g/dL      Hematocrit 31.9 %      MCV 83.3 fL      MCH 27.4 pg      MCHC 32.9 g/dL      RDW 18.5 %      RDW-SD 55.5 fl      MPV 10.1 fL      Platelets 128 10*3/mm3      Neutrophil % 78.2 %      Lymphocyte % 12.0 %      Monocyte % 8.9 %      Eosinophil % 0.3 %      Basophil % 0.2 %      Neutrophils, Absolute 11.22 10*3/mm3      Lymphocytes, Absolute 1.72 10*3/mm3      Monocytes, Absolute 1.27 10*3/mm3      Eosinophils, Absolute 0.04 10*3/mm3      Basophils, Absolute 0.03 10*3/mm3     POC Glucose Once [[id number]]  (Abnormal) Collected: 04/28/22 2020    Specimen: Blood Updated: 04/28/22 2022     Glucose 242 mg/dL      Comment: Meter: [id number] : [id number] Cedric MCNULTY       POC Glucose Once [[id number]]  (Abnormal) Collected: 04/28/22 1612    Specimen: Blood Updated: 04/28/22 1614     Glucose 135 mg/dL      Comment: Meter: [id number] : [id number] Domo SAAVEDRA       Procalcitonin [[id number]]  (Normal) Collected: 04/28/22 0434    Specimen: Blood Updated: 04/28/22 1336     Procalcitonin 0.24 ng/mL     Narrative:      As a Marker for Sepsis  "(Non-Neonates):    1. <0.5 ng/mL represents a low risk of severe sepsis and/or septic shock.  2. >2 ng/mL represents a high risk of severe sepsis and/or septic shock.    As a Marker for Lower Respiratory Tract Infections that require antibiotic therapy:    PCT on Admission    Antibiotic Therapy       6-12 Hrs later    >0.5                Strongly Recommended  >0.25 - <0.5        Recommended   0.1 - 0.25          Discouraged              Remeasure/reassess PCT  <0.1                Strongly Discouraged     Remeasure/reassess PCT    As 28 day mortality risk marker: \"Change in Procalcitonin Result\" (>80% or <=80%) if Day 0 (or Day 1) and Day 4 values are available. Refer to [email or web address]://[email or web address] [email or web address] – [email or web address]-[email or web address]    Change in PCT <=80%  A decrease of PCT levels below or equal to 80% defines a positive change in PCT test result representing a higher risk for 28-day all-cause mortality of patients diagnosed with severe sepsis for septic shock.    Change in PCT >80%  A decrease of PCT levels of more than 80% defines a negative change in PCT result representing a lower risk for 28-day all-cause mortality of patients diagnosed with severe sepsis or septic shock.       Hemoglobin A1c [[id number]]  (Abnormal) Collected: 04/28/22 1103    Specimen: Blood Updated: 04/28/22 1148     Hemoglobin A1C 5.80 %     Narrative:      Hemoglobin A1C Ranges:    Increased Risk for Diabetes  5.7% to 6.4%  Diabetes                     >= 6.5%  Diabetic Goal                < 7.0%    POC Glucose Once [[id number]]  (Normal) Collected: 04/28/22 1124    Specimen: Blood Updated: 04/28/22 1127     Glucose 105 mg/dL      Comment: Meter: [id number] : [id number] Domo SAAVEDRA       POC Glucose Once [[id number]]  (Normal) Collected: 04/28/22 0638    Specimen: Blood Updated: 04/28/22 0655     Glucose 113 mg/dL      Comment: Meter: [id number] : [id number] Nabil SAAVEDRA       Renal Function Panel [[id number]]  (Abnormal) Collected: 04/28/22 0434    " Specimen: Blood Updated: 04/28/22 0549     Glucose 106 mg/dL      BUN 21 mg/dL      Creatinine 3.06 mg/dL      Sodium 135 mmol/L      Potassium 4.6 mmol/L      Chloride 97 mmol/L      CO2 24.5 mmol/L      Calcium 9.0 mg/dL      Albumin 3.40 g/dL      Phosphorus 2.6 mg/dL      Anion Gap 13.5 mmol/L      BUN/Creatinine Ratio 6.9     eGFR 17.3 mL/min/1.73      Comment: National Kidney Foundation and American Society of Nephrology (ASN) Task Force recommended calculation based on the Chronic Kidney Disease Epidemiology Collaboration (CKD-EPI) equation refit without adjustment for race.       Narrative:      GFR Normal >60  Chronic Kidney Disease <60  Kidney Failure <15      Magnesium [[id number]]  (Normal) Collected: 04/28/22 0434    Specimen: Blood Updated: 04/28/22 0535     Magnesium 1.7 mg/dL     CBC & Differential [[id number]]  (Abnormal) Collected: 04/28/22 0434    Specimen: Blood Updated: 04/28/22 0520    Narrative:      The following orders were created for panel order CBC & Differential.  Procedure                               Abnormality         Status                     ---------                               -----------         ------                     CBC Auto Differential[[id number]]        Abnormal            Final result                 Please view results for these tests on the individual orders.    CBC Auto Differential [[id number]]  (Abnormal) Collected: 04/28/22 0434    Specimen: Blood Updated: 04/28/22 0520     WBC 13.49 10*3/mm3      RBC 4.13 10*6/mm3      Hemoglobin 11.3 g/dL      Hematocrit 33.3 %      MCV 80.6 fL      MCH 27.4 pg      MCHC 33.9 g/dL      RDW 18.4 %      RDW-SD 53.4 fl      MPV 10.4 fL      Platelets 137 10*3/mm3      Neutrophil % 82.8 %      Lymphocyte % 8.6 %      Monocyte % 7.6 %      Eosinophil % 0.1 %      Basophil % 0.5 %      Neutrophils, Absolute 11.17 10*3/mm3      Lymphocytes, Absolute 1.16 10*3/mm3      Monocytes, Absolute 1.03 10*3/mm3      Eosinophils, Absolute 0.01  10*3/mm3      Basophils, Absolute 0.07 10*3/mm3     Urinalysis, Microscopic Only - Urine, Catheter [[id number]]  (Abnormal) Collected: 04/27/22 1712    Specimen: Urine, Catheter Updated: 04/27/22 2301     RBC, UA 6-12 /HPF      WBC, UA 31-50 /HPF      Bacteria, UA Trace /HPF      Squamous Epithelial Cells, UA 0-2 /HPF      Hyaline Casts, UA 3-6 /LPF      Methodology Manual Light Microscopy    Urinalysis With Culture If Indicated - Urine, Catheter [[id number]]  (Abnormal) Collected: 04/27/22 1712    Specimen: Urine, Catheter Updated: 04/27/22 2247     Color, UA Yellow     Appearance, UA Clear     pH, UA 8.0     Specific Gravity, UA 1.020     Glucose,  mg/dL (1+)     Ketones, UA Negative     Bilirubin, UA Negative     Blood, UA Small (1+)     Protein, UA >=300 mg/dL (3+)     Leuk Esterase, UA Small (1+)     Nitrite, UA Negative     Urobilinogen, UA 0.2 E.U./dL    COVID PRE-OP / PRE-PROCEDURE SCREENING ORDER (NO ISOLATION) - Swab, Nasopharynx [[id number]]  (Normal) Collected: 04/27/22 1711    Specimen: Swab from Nasopharynx Updated: 04/27/22 1810    Narrative:      The following orders were created for panel order COVID PRE-OP / PRE-PROCEDURE SCREENING ORDER (NO ISOLATION) - Swab, Nasopharynx.  Procedure                               Abnormality         Status                     ---------                               -----------         ------                     COVID-19,[facility] IN-HOUSE...[[id number]]  Normal              Final result                 Please view results for these tests on the individual orders.    COVID-19,[facility] IN-HOUSE CEPHEID/MARTY NP SWAB IN TRANSPORT MEDIA 8-12 HR TAT - Swab, Nasopharynx [[id number]]  (Normal) Collected: 04/27/22 1711    Specimen: Swab from Nasopharynx Updated: 04/27/22 1810     COVID19 Not Detected    Narrative:      Fact sheet for providers: https://www.fda.gov/media/449801/download     Fact sheet for patients: https://www.fda.gov/media/918026/download    Urine Drug Screen -  Urine, Clean Catch [[id number]]  (Abnormal) Collected: 04/27/22 1712    Specimen: Urine, Clean Catch Updated: 04/27/22 1804     Amphet/Methamphet, Screen Negative     Barbiturates Screen, Urine Negative     Benzodiazepine Screen, Urine Positive     Cocaine Screen, Urine Negative     Opiate Screen Negative     THC, Screen, Urine Negative     Methadone Screen, Urine Negative     Oxycodone Screen, Urine Positive    Narrative:      Negative Thresholds Per Drugs Screened:    Amphetamines                 500 ng/ml  Barbiturates                 200 ng/ml  Benzodiazepines              100 ng/ml  Cocaine                      300 ng/ml  Methadone                    300 ng/ml  Opiates                      300 ng/ml  Oxycodone                    100 ng/ml  THC                           50 ng/ml    The Normal Value for all drugs tested is negative. This report includes final unconfirmed screening results to be used for medical treatment purposes only. Unconfirmed results must not be used for non-medical purposes such as employment or legal testing. Clinical consideration should be applied to any drug of abuse test, particularly when unconfirmed results are used.            Comprehensive Metabolic Panel [[id number]]  (Abnormal) Collected: 04/27/22 1615    Specimen: Blood Updated: 04/27/22 1722     Glucose 178 mg/dL      BUN 57 mg/dL      Creatinine 5.37 mg/dL      Sodium 132 mmol/L      Potassium 6.7 mmol/L      Chloride 94 mmol/L      CO2 24.1 mmol/L      Calcium 9.0 mg/dL      Total Protein 6.9 g/dL      Albumin 3.80 g/dL      ALT (SGPT) 14 U/L      AST (SGOT) 30 U/L      Alkaline Phosphatase 140 U/L      Total Bilirubin 0.9 mg/dL      Globulin 3.1 gm/dL      A/G Ratio 1.2 g/dL      BUN/Creatinine Ratio 10.6     Anion Gap 13.9 mmol/L      eGFR 8.8 mL/min/1.73      Comment: <15 Indicative of kidney failure       Narrative:      GFR Normal >60  Chronic Kidney Disease <60  Kidney Failure <15      Blood Gas, Arterial - [[id number]]   (Abnormal) Collected: 04/27/22 1648    Specimen: Arterial Blood Updated: 04/27/22 1659     Site Arterial: right radial     Omar's Test Positive     pH, Arterial 7.395 pH units      pCO2, Arterial 44.0 mm Hg      pO2, Arterial 52.8 mm Hg      Comment: Meter: [id number] : [id number] Roshan NicoleCritical:Notify Dr dr madan farooq (27-Apr-22 16:53:55)Read back ok        HCO3, Arterial 27.0 mmol/L      Base Excess, Arterial 1.7 mmol/L      O2 Saturation Calculated 86.6 %      A-a Gradiant 0.5 mmHg      Barometric Pressure for Blood Gas 759.5 mmHg      Modality Room Air     FIO2 21 %      Rate 18 Breaths/minute     Ethanol [[id number]] Collected: 04/27/22 1615    Specimen: Blood Updated: 04/27/22 1657     Ethanol <10 mg/dL      Ethanol % <0.010 %         Data Review:  Results from last 7 days   Lab Units 05/05/22  0557 05/04/22  0320 05/03/22  0340   SODIUM mmol/L 131* 132* 129*   POTASSIUM mmol/L 3.8 3.9 4.6   CHLORIDE mmol/L 95* 99 96*   CO2 mmol/L 25.0 19.0* 21.0*   BUN mg/dL 32* 19 39*   CREATININE mg/dL 3.85* 2.93* 5.04*   GLUCOSE mg/dL 166* 206* 183*   CALCIUM mg/dL 7.8* 7.9* 7.9*     Results from last 7 days   Lab Units 05/05/22  0557 05/04/22  0320 05/03/22  0340   WBC 10*3/mm3 11.38* 17.72* 16.34*   HEMOGLOBIN g/dL 7.9* 9.6* 10.0*   HEMATOCRIT % 24.3* 29.8* 30.7*   PLATELETS 10*3/mm3 245 214 193             Lab Results   Lab Value Date/Time    TROPONINT 0.092 (C) 12/19/2021 1314    TROPONINT 0.117 (C) 11/30/2021 0153    TROPONINT 0.132 (C) 11/29/2021 2121     Results from last 7 days   Lab Units 04/30/22  0327   CHOLESTEROL mg/dL 145   TRIGLYCERIDES mg/dL 119   HDL CHOL mg/dL 44   LDL CHOL mg/dL 80           Invalid input(s): PROT, LABALBU          Glucose   Date/Time Value Ref Range Status   05/05/2022 1609 183 (H) 70 - 130 mg/dL Final     Comment:     Meter: [id number] : [id number] Tawnya SAAVEDRA   05/05/2022 1237 103 70 - 130 mg/dL Final     Comment:     Meter: [id number] : [id number]  Tawnya Townsend NA   05/05/2022 0619 174 (H) 70 - 130 mg/dL Final     Comment:     Meter: [id number] : [id number] Heron Miller NA   05/04/2022 2342 127 70 - 130 mg/dL Final     Comment:     Meter: [id number] : [id number] Heron Miller NA   05/04/2022 1759 190 (H) 70 - 130 mg/dL Final     Comment:     Meter: [id number] : [id number] NicoleKEMPaddy Muniz NA   05/04/2022 1151 216 (H) 70 - 130 mg/dL Final     Comment:     Meter: [id number] : [id number] Nicole Alvarez NA   05/04/2022 0608 271 (H) 70 - 130 mg/dL Final     Comment:     Meter: [id number] : [id number] Sadiq SAAVEDRA   05/04/2022 0006 189 (H) 70 - 130 mg/dL Final     Comment:     Meter: [id number] : [id number] Sadiq SAAVEDRA     Results from last 7 days   Lab Units 04/29/22  2317   INR  0.97       Past Medical History:   Diagnosis Date   • Acute CVA (cerebrovascular accident) (HCC) 5/1/2022   • Acute on chronic diastolic CHF (congestive heart failure) (HCC)    • CAD (coronary artery disease) 12/20/2021   • Diabetes (HCC)    • Disease of thyroid gland    • GERD (gastroesophageal reflux disease)    • Hyperlipidemia 11/30/2018   • Hypertension    • Rheumatoid arthritis (HCC)        Assessment:  Active Hospital Problems    Diagnosis  POA   • **Encephalopathy, toxic [G92.9]  Unknown   • Acute CVA (cerebrovascular accident) (HCC) [I63.9]  Unknown   • Intracranial hemorrhage (HCC) [I62.9]  Unknown   • Acute metabolic encephalopathy [G93.41]  Yes   • Leukocytosis [D72.829]  Yes   • Acute on chronic diastolic CHF (congestive heart failure) (HCC) [I50.33]  Yes   • CAD (coronary artery disease) [I25.10]  Yes   • ESRD (end stage renal disease) ([facility]) [N18.6]  Yes   • Hypertensive disorder [I10]  Yes   • Acute DM type 2 causing complication (HCC) [E11.8]  Yes      Resolved Hospital Problems   No resolved problems to display.       Plan:  Continue with current medication.    As per multiple consultants.  Follow-up lab. HD per renal.   Out of  Restraints.    Antibiotics per ID for infected dialysis catheter.  Will need rehab.    Aris Isbell MD  5/5/2022  17:17 EDT

## 2022-05-05 NOTE — PROGRESS NOTES
"[facility] Clinical Pharmacy Services: Vancomycin Monitoring Note    Zaina Martinez is a 56 y.o. female who is on day day 7 of pharmacy to dose vancomycin for SSTI per Dr. Soriano's request. Dr. Canas following for sepsis and HD catheter-related infection w/MRSA.    Previous Vancomycin Dose: 500 mg IV once on 5/4 at 0028    Results from last 7 days   Lab Units 05/04/22  0320 05/03/22  0340 05/02/22  0329   VANCOMYCIN RM mcg/mL 21.70 16.10 19.70     Vitals/Labs  Ht: 157.5 cm (62.01\"); Wt: 46.2 kg (101 lb 13.6 oz)   Temp Readings from Last 1 Encounters:   05/04/22 97.3 °F (36.3 °C) (Oral)     Estimated Creatinine Clearance: 11.9 mL/min (A) (by C-G formula based on SCr of 3.85 mg/dL (H)).   Dialysis TTS    Results from last 7 days   Lab Units 05/05/22  0557 05/04/22  0320 05/03/22  0340   CREATININE mg/dL 3.85* 2.93* 5.04*   WBC 10*3/mm3 11.38* 17.72* 16.34*     Assessment/Plan    Current Vancomycin Dose: 500 mg IV once post-HD today; provides a predicted pre-HD concentration of ~16.5 mg/L   Next Level Date and Time: Pre-HD Vanc Random with AM labs on 5/7  We will continue to monitor patient changes and renal function     Thank you for involving pharmacy in this patient's care. Please contact pharmacy with any questions or concerns.       Jeremy Nguyen III, PharmD  Clinical Pharmacist    "

## 2022-05-06 LAB
ALBUMIN SERPL-MCNC: 2.7 G/DL (ref 3.5–5.2)
ANION GAP SERPL CALCULATED.3IONS-SCNC: 12.3 MMOL/L (ref 5–15)
BUN SERPL-MCNC: 17 MG/DL (ref 6–20)
BUN/CREAT SERPL: 6.4 (ref 7–25)
CALCIUM SPEC-SCNC: 8 MG/DL (ref 8.6–10.5)
CHLORIDE SERPL-SCNC: 99 MMOL/L (ref 98–107)
CO2 SERPL-SCNC: 23.7 MMOL/L (ref 22–29)
CREAT SERPL-MCNC: 2.66 MG/DL (ref 0.57–1)
DEPRECATED RDW RBC AUTO: 51.4 FL (ref 37–54)
EGFRCR SERPLBLD CKD-EPI 2021: 20.5 ML/MIN/1.73
ERYTHROCYTE [DISTWIDTH] IN BLOOD BY AUTOMATED COUNT: 17.1 % (ref 12.3–15.4)
FERRITIN SERPL-MCNC: 1034 NG/ML (ref 13–150)
GLUCOSE BLDC GLUCOMTR-MCNC: 140 MG/DL (ref 70–130)
GLUCOSE BLDC GLUCOMTR-MCNC: 168 MG/DL (ref 70–130)
GLUCOSE BLDC GLUCOMTR-MCNC: 247 MG/DL (ref 70–130)
GLUCOSE SERPL-MCNC: 130 MG/DL (ref 65–99)
HCT VFR BLD AUTO: 29.4 % (ref 34–46.6)
HGB BLD-MCNC: 9.5 G/DL (ref 12–15.9)
IRON 24H UR-MRATE: 19 MCG/DL (ref 37–145)
IRON SATN MFR SERPL: 12 % (ref 20–50)
MCH RBC QN AUTO: 27 PG (ref 26.6–33)
MCHC RBC AUTO-ENTMCNC: 32.3 G/DL (ref 31.5–35.7)
MCV RBC AUTO: 83.5 FL (ref 79–97)
PHOSPHATE SERPL-MCNC: 2.2 MG/DL (ref 2.5–4.5)
PLATELET # BLD AUTO: 313 10*3/MM3 (ref 140–450)
PMV BLD AUTO: 10.5 FL (ref 6–12)
POTASSIUM SERPL-SCNC: 3.9 MMOL/L (ref 3.5–5.2)
RBC # BLD AUTO: 3.52 10*6/MM3 (ref 3.77–5.28)
SODIUM SERPL-SCNC: 135 MMOL/L (ref 136–145)
TIBC SERPL-MCNC: 159 MCG/DL (ref 298–536)
TRANSFERRIN SERPL-MCNC: 107 MG/DL (ref 200–360)
WBC NRBC COR # BLD: 7.95 10*3/MM3 (ref 3.4–10.8)

## 2022-05-06 PROCEDURE — 82728 ASSAY OF FERRITIN: CPT | Performed by: INTERNAL MEDICINE

## 2022-05-06 PROCEDURE — 82962 GLUCOSE BLOOD TEST: CPT

## 2022-05-06 PROCEDURE — 97530 THERAPEUTIC ACTIVITIES: CPT

## 2022-05-06 PROCEDURE — 83540 ASSAY OF IRON: CPT | Performed by: INTERNAL MEDICINE

## 2022-05-06 PROCEDURE — 97110 THERAPEUTIC EXERCISES: CPT

## 2022-05-06 PROCEDURE — 80069 RENAL FUNCTION PANEL: CPT | Performed by: INTERNAL MEDICINE

## 2022-05-06 PROCEDURE — 63710000001 INSULIN LISPRO (HUMAN) PER 5 UNITS: Performed by: INTERNAL MEDICINE

## 2022-05-06 PROCEDURE — 97535 SELF CARE MNGMENT TRAINING: CPT

## 2022-05-06 PROCEDURE — 85027 COMPLETE CBC AUTOMATED: CPT | Performed by: INTERNAL MEDICINE

## 2022-05-06 PROCEDURE — 99232 SBSQ HOSP IP/OBS MODERATE 35: CPT | Performed by: STUDENT IN AN ORGANIZED HEALTH CARE EDUCATION/TRAINING PROGRAM

## 2022-05-06 PROCEDURE — 84466 ASSAY OF TRANSFERRIN: CPT | Performed by: INTERNAL MEDICINE

## 2022-05-06 RX ORDER — HYDRALAZINE HYDROCHLORIDE 25 MG/1
25 TABLET, FILM COATED ORAL EVERY 8 HOURS SCHEDULED
Status: DISCONTINUED | OUTPATIENT
Start: 2022-05-06 | End: 2022-05-07

## 2022-05-06 RX ORDER — ROPINIROLE 0.5 MG/1
0.75 TABLET, FILM COATED ORAL ONCE
Status: COMPLETED | OUTPATIENT
Start: 2022-05-06 | End: 2022-05-06

## 2022-05-06 RX ADMIN — SERTRALINE 100 MG: 100 TABLET, FILM COATED ORAL at 08:25

## 2022-05-06 RX ADMIN — ACETAMINOPHEN 650 MG: 325 TABLET, FILM COATED ORAL at 12:41

## 2022-05-06 RX ADMIN — Medication 10 ML: at 08:25

## 2022-05-06 RX ADMIN — SODIUM PHOSPHATE, MONOBASIC, MONOHYDRATE AND SODIUM PHOSPHATE, DIBASIC, ANHYDROUS 10 MMOL: 276; 142 INJECTION, SOLUTION INTRAVENOUS at 17:29

## 2022-05-06 RX ADMIN — ROPINIROLE HYDROCHLORIDE 0.75 MG: 0.5 TABLET, FILM COATED ORAL at 14:52

## 2022-05-06 RX ADMIN — AMLODIPINE BESYLATE 10 MG: 10 TABLET ORAL at 08:25

## 2022-05-06 RX ADMIN — HYDRALAZINE HYDROCHLORIDE 10 MG: 10 TABLET, FILM COATED ORAL at 14:52

## 2022-05-06 RX ADMIN — HYDRALAZINE HYDROCHLORIDE 10 MG: 10 TABLET, FILM COATED ORAL at 06:31

## 2022-05-06 RX ADMIN — HYDRALAZINE HYDROCHLORIDE 25 MG: 25 TABLET, FILM COATED ORAL at 21:00

## 2022-05-06 RX ADMIN — INSULIN LISPRO 5 UNITS: 100 INJECTION, SOLUTION INTRAVENOUS; SUBCUTANEOUS at 12:41

## 2022-05-06 RX ADMIN — Medication 10 ML: at 21:00

## 2022-05-06 RX ADMIN — PANTOPRAZOLE SODIUM 40 MG: 40 INJECTION, POWDER, FOR SOLUTION INTRAVENOUS at 06:31

## 2022-05-06 RX ADMIN — TAMSULOSIN HYDROCHLORIDE 0.4 MG: 0.4 CAPSULE ORAL at 08:25

## 2022-05-06 RX ADMIN — LEVOTHYROXINE SODIUM 125 MCG: 0.12 TABLET ORAL at 06:31

## 2022-05-06 RX ADMIN — ACETAMINOPHEN 650 MG: 325 TABLET, FILM COATED ORAL at 06:31

## 2022-05-06 RX ADMIN — ROPINIROLE HYDROCHLORIDE 0.75 MG: 0.5 TABLET, FILM COATED ORAL at 21:35

## 2022-05-06 NOTE — DISCHARGE PLACEMENT REQUEST
"Zaina Motta (56 y.o. Female)             Date of Birth   1965    Social Security Number       Address   [address] [place] [address]    Home Phone   [phone]    MRN   [id number]       Hindu   None    Marital Status                               Admission Date   4/27/22    Admission Type   Emergency    Admitting Provider   Starla Boston MD    Attending Provider   Osmar King MD    Department, Room/Bed   [address], [address]/1       Discharge Date       Discharge Disposition       Discharge Destination                               Attending Provider: Osmar King MD    Allergies: Contrast Dye, Ibuprofen, Prochlorperazine    Isolation: Contact   Infection: MRSA (04/29/22)   Code Status: CPR   Advance Care Planning Activity    Ht: 157.5 cm (62.01\")   Wt: 46 kg (101 lb 6.4 oz)    Admission Cmt: None   Principal Problem: Encephalopathy, toxic [G92.9]                 Active Insurance as of 4/27/2022     Primary Coverage     Payor Plan Insurance Group Employer/Plan Group    [facility] MEDICARE REPLACEMENT [facility] MEDICARE ADVANTAGE [id number]     Payor Plan Address Payor Plan Phone Number Payor Plan Fax Number Effective Dates    [address] [phone]  1/1/2019 - None Entered    [facility] [id number]       Subscriber Name Subscriber Birth Date Member ID       ZAINA MOTTA 1965 [id number]                 Emergency Contacts      (Rel.) Home Phone Work Phone Mobile Phone    Marv Motta (Spouse) -- -- [phone]    JuanFiona (Daughter) -- -- [phone]            "

## 2022-05-06 NOTE — THERAPY TREATMENT NOTE
Patient Name: Zaina Martinez  : 1965    MRN: [id number]                              Today's Date: 2022       Admit Date: 2022    Visit Dx:     ICD-10-CM ICD-9-CM   1. Acute metabolic encephalopathy  G93.41 348.31   2. ESRD (end stage renal disease) on dialysis ([facility])  N18.6 585.6    Z99.2 V45.11   3. Acute hypoxemic respiratory failure ([facility])  J96.01 518.81   4. Hyperkalemia  E87.5 276.7   5. ESRD (end stage renal disease) ([facility])  N18.6 585.6     Patient Active Problem List   Diagnosis   • Renal insufficiency   • Hypertensive disorder   • Hypothyroidism   • Acute DM type 2 causing complication ([facility])   • Rheumatoid arthritis ([facility])   • Angioedema   • Esophageal dysmotility   • Anemia   • Medically noncompliant   • Myocardial infarction due to demand ischemia ([facility])   • Enteritis   • PRES (posterior reversible encephalopathy syndrome)   • Urine retention   • Klebsiella infection   • Superficial thrombophlebitis   • Generalized weakness   • ESRD (end stage renal disease) ([facility])   • CAD (coronary artery disease)   • Abnormal urinalysis   • Acute on chronic diastolic CHF (congestive heart failure) ([facility])   • Pyelonephritis   • Calculus of gallbladder with acute on chronic cholecystitis without obstruction   • Pleural effusion on right   • Anemia due to chronic kidney disease, on chronic dialysis ([facility])   • Abnormal findings on diagnostic imaging of other specified body structures   • Acute upper respiratory infection   • Agitation   • Alkaline phosphatase raised   • Casts present in urine   • Cellulitis of toe   • Hip pain   • Community acquired pneumonia   • Depressive disorder   • Diarrhea of presumed infectious origin   • Difficult or painful urination   • Disease due to severe acute respiratory syndrome coronavirus 2 (SARS-CoV-2)   • Dyspnea   • Encounter for follow-up examination after completed treatment for conditions other than malignant neoplasm   • H/O: hypothyroidism   • Hyperlipidemia   •  Hypomagnesemia   • Intractable vomiting with nausea   • Leukocytosis   • Luetscher's syndrome   • Need for influenza vaccination   • Restless legs   • Noncompliance with treatment   • Shoulder pain   • Urinary tract infectious disease   • Metabolic encephalopathy   • Abnormal findings on diagnostic imaging of abdomen   • Status post cholecystectomy   • Hyponatremia   • Leukocytosis   • Acute metabolic encephalopathy   • Encephalopathy, toxic   • Acute CVA (cerebrovascular accident) ([facility])   • Intracranial hemorrhage ([facility])     Past Medical History:   Diagnosis Date   • Acute CVA (cerebrovascular accident) (HCC) 5/1/2022   • Acute on chronic diastolic CHF (congestive heart failure) ([facility]CC)    • CAD (coronary artery disease) 12/20/2021   • Diabetes (HCC)    • Disease of thyroid gland    • GERD (gastroesophageal reflux disease)    • Hyperlipidemia 11/30/2018   • Hypertension    • Rheumatoid arthritis (HCC)      Past Surgical History:   Procedure Laterality Date   • CHOLECYSTECTOMY WITH INTRAOPERATIVE CHOLANGIOGRAM N/A 1/10/2022    Procedure: Laparoscopic cholecystectomy with intraoperative cholangiogram;  Surgeon: Ramana Raygoza MD;  Location: [facility];  Service: General;  Laterality: N/A;   • EYE SURGERY     • HYSTERECTOMY     • INSERTION HEMODIALYSIS CATHETER N/A 12/6/2021    Procedure: HEMODIALYSIS CATHETER INSERTION;  Surgeon: Keli Salazar MD;  Location: [facility] 18/19;  Service: Vascular;  Laterality: N/A;   • INSERTION HEMODIALYSIS CATHETER N/A 5/3/2022    Procedure: TUNNELED CATHETER PLACEMENT;  Surgeon: Keli Salazar MD;  Location: [facility];  Service: Vascular;  Laterality: N/A;      General Information     Row Name 05/06/22 1442          OT Time and Intention    Document Type therapy note (daily note)  -     Mode of Treatment occupational therapy;individual therapy  -     Row Name 05/06/22 1442          General Information    Patient Profile Reviewed yes  -SM     Existing  Precautions/Restrictions fall  -[facility] Name 05/06/22 1442          Cognition    Orientation Status (Cognition) oriented x 3  -[facility] Name 05/06/22 1442          Safety Issues, Functional Mobility    Impairments Affecting Function (Mobility) balance;endurance/activity tolerance;postural/trunk control;strength;pain  -           User Key  (r) = Recorded By, (t) = Taken By, (c) = Cosigned By    Initials Name Provider Type     Tesha Charles OT Occupational Therapist                 Mobility/ADL's     Row Name 05/06/22 1442          Bed Mobility    Supine-Sit [place] (Bed Mobility) minimum assist (75% patient effort)  -     Sit-Supine [place] (Bed Mobility) minimum assist (75% patient effort)  -     Assistive Device (Bed Mobility) head of bed elevated  -SM     Row Name 05/06/22 1442          Transfers    Sit-Stand [place] (Transfers) minimum assist (75% patient effort);verbal cues  -SM     Row Name 05/06/22 1442          Sit-Stand Transfer    Assistive Device (Sit-Stand Transfers) walker, front-wheeled  -SM     Row Name 05/06/22 [facility]          Functional Mobility    Functional Mobility- Comment too fatiqued from walking to the door and back this morning with PT. Static stand only this encounter with OT. ADLs completed EOB 2/2 impaired functional mobility.  -[facility] Name 05/06/22 1442          Lower Body Dressing Assessment/Training    [place] Level (Lower Body Dressing) lower body dressing skills;don;socks;maximum assist (25% patient effort)  -     Position (Lower Body Dressing) edge of bed sitting  -[facility] Name 05/06/22 1442          Grooming Assessment/Training    [place] Level (Grooming) grooming skills;standby assist;oral care regimen  -     Position (Grooming) sitting up in bed  -[facility] Name 05/06/22 1442          Upper Body Dressing Assessment/Training    [place] Level (Upper Body Dressing) upper body dressing skills;don;standby assist  gown  -      Position (Upper Body Dressing) edge of bed sitting  -           User Key  (r) = Recorded By, (t) = Taken By, (c) = Cosigned By    Initials Name Provider Type    Tesha Ardon OT Occupational Therapist               Obj/Interventions     Row Name 05/06/22 1444          Range of Motion Comprehensive    Comment, General Range of Motion BUE AROM WFL  -[facility] Name 05/06/22 1444          Strength Comprehensive (MMT)    Comment, General Manual Muscle Testing (MMT) Assessment RUE 3+/5, LUE 3/5 MMT  -[facility] Name 05/06/22 1444          Shoulder (Therapeutic Exercise)    Shoulder (Therapeutic Exercise) AROM (active range of motion)  -     Shoulder AROM (Therapeutic Exercise) bilateral;flexion;extension;5 repetitions  forward press  -[facility] Name 05/06/22 1444          Elbow/Forearm (Therapeutic Exercise)    Elbow/Forearm (Therapeutic Exercise) AROM (active range of motion)  -     Elbow/Forearm AROM (Therapeutic Exercise) bilateral;flexion;extension;5 repetitions  -[facility] Name 05/06/22 1444          Motor Skills    Motor Skills functional endurance  -     Functional Endurance fair -, rest breaks taken throughout, fatiques quickly  -     Therapeutic Exercise shoulder;elbow/forearm  -[facility] Name 05/06/22 1444          Balance    Static Sitting Balance standby assist  -     Position, Sitting Balance sitting edge of bed  -     Static Standing Balance minimal assist  -     Position/Device Used, Standing Balance supported  -     Comment, Balance stands ~ 30 seconds  -           User Key  (r) = Recorded By, (t) = Taken By, (c) = Cosigned By    Initials Name Provider Type     Tesha Charles OT Occupational Therapist               Goals/Plan    No documentation.                Clinical Impression     [facility] Name 05/06/22 1445          Pain Assessment    Pre/Posttreatment Pain Comment reports pain in hips and back- not rated  -     Pain Intervention(s) Repositioned  -[facility] Name  05/06/22 1442          Plan of Care Review    Plan of Care Reviewed With patient;friend  -     Progress improving  -     Outcome Evaluation Pt showing large improvements than previous OT encounter. She is more oriented and follows commands WFL. OT better able to functionally assess pt today due to improved cogntion. She reports weakness in LUE, however using it functionally with some extra time. She was able to work on EOB ADLs and UE ther ex with SBA for sitting balance today. She was able to complete bed mobility and stand with min A. She c/o of generalized weakness and is limited with activity tolerance today. OT discusses acute rehab at [facility] and pt is now appropriate for acute rehab. She would benefit from continued OT to address UE strength, endurance, ADLs, transfers.  -     Row Name 05/06/22 8244          Therapy Plan Review/Discharge Plan (OT)    Anticipated Discharge Disposition (OT) inpatient rehabilitation facility  -     Row Name 05/06/22 1448          Positioning and Restraints    Pre-Treatment Position in bed  -SM     Post Treatment Position bed  -SM     In Bed fowlers;call light within reach;encouraged to call for assist;exit alarm on;notified nsg;with family/caregiver  -           User Key  (r) = Recorded By, (t) = Taken By, (c) = Cosigned By    Initials Name Provider Type    Tesha Ardon, VIOLET Occupational Therapist               Outcome Measures     Row Name 05/06/22 9847          How much help from another is currently needed...    Putting on and taking off regular lower body clothing? 2  -SM     Bathing (including washing, rinsing, and drying) 2  -SM     Toileting (which includes using toilet bed pan or urinal) 2  -SM     Putting on and taking off regular upper body clothing 3  -SM     Taking care of personal grooming (such as brushing teeth) 3  -SM     Eating meals 3  -SM     AM-PAC 6 Clicks Score (OT) 15  -SM     Row Name 05/06/22 5955          How much help from another person do  you currently need...    Turning from your back to your side while in flat bed without using bedrails? 3  -CF     Moving from lying on back to sitting on the side of a flat bed without bedrails? 3  -CF     Moving to and from a bed to a chair (including a wheelchair)? 3  -CF     Standing up from a chair using your arms (e.g., wheelchair, bedside chair)? 3  -CF     Climbing 3-5 steps with a railing? 2  -CF     To walk in hospital room? 3  -CF     AM-PAC 6 Clicks Score (PT) 17  -CF     Highest level of mobility 5 --> Static standing  -CF     Row Name 05/06/22 1449 05/06/22 1345       Functional Assessment    Outcome Measure Options AM-PAC 6 Clicks Daily Activity (OT)  - AM-PAC 6 Clicks Basic Mobility (PT)  -CF          User Key  (r) = Recorded By, (t) = Taken By, (c) = Cosigned By    Initials Name Provider Type    Tesha Ardon, OT Occupational Therapist    CF Samantha Zarate, PT Physical Therapist                Occupational Therapy Education                 Title: PT OT SLP Therapies (In Progress)     Topic: Occupational Therapy (Not Started)     Point: ADL training (Not Started)     Description:   Instruct learner(s) on proper safety adaptation and remediation techniques during self care or transfers.   Instruct in proper use of assistive devices.              Learner Progress:  Not documented in this visit.          Point: Home exercise program (Not Started)     Description:   Instruct learner(s) on appropriate technique for monitoring, assisting and/or progressing therapeutic exercises/activities.              Learner Progress:  Not documented in this visit.          Point: Precautions (Not Started)     Description:   Instruct learner(s) on prescribed precautions during self-care and functional transfers.              Learner Progress:  Not documented in this visit.          Point: Body mechanics (Not Started)     Description:   Instruct learner(s) on proper positioning and spine alignment during  self-care, functional mobility activities and/or exercises.              Learner Progress:  Not documented in this visit.                          OT Recommendation and Plan  Planned Therapy Interventions (OT): activity tolerance training, adaptive equipment training, cognitive/visual perception retraining, BADL retraining, functional balance retraining, IADL retraining, neuromuscular control/coordination retraining, patient/caregiver education/training, occupation/activity based interventions, transfer/mobility retraining, ROM/therapeutic exercise, strengthening exercise, passive ROM/stretching  Therapy Frequency (OT): 5 times/wk  Plan of Care Review  Plan of Care Reviewed With: patient, friend  Progress: improving  Outcome Evaluation: Pt showing large improvements than previous OT encounter. She is more oriented and follows commands WFL. OT better able to functionally assess pt today due to improved cogntion. She reports weakness in LUE, however using it functionally with some extra time. She was able to work on EOB ADLs and UE ther ex with SBA for sitting balance today. She was able to complete bed mobility and stand with min A. She c/o of generalized weakness and is limited with activity tolerance today. OT discusses acute rehab at [facility] and pt is now appropriate for acute rehab. She would benefit from continued OT to address UE strength, endurance, ADLs, transfers.     Time Calculation:    Time Calculation- OT     Row Name 05/06/22 1450             Time Calculation- OT    OT Start Time 1344  -      OT Stop Time 1408  -      OT Time Calculation (min) 24 min  -SM      Total Timed Code Minutes- OT 24 minute(s)  -SM      OT Received On 05/06/22  -      OT - Next Appointment 05/09/22  -              Timed Charges    61358 - OT Therapeutic Exercise Minutes 8  -SM      73346 - OT Self Care/Mgmt Minutes 16  -SM              Total Minutes    Timed Charges Total Minutes 24  -SM       Total Minutes 24  -SM             User Key  (r) = Recorded By, (t) = Taken By, (c) = Cosigned By    Initials Name Provider Type     Tesha Charles OT Occupational Therapist              Therapy Charges for Today     Code Description Service Date Service Provider Modifiers Qty    [id number]  OT THER PROC EA 15 MIN 5/6/2022 Tesha Charles OT GO 1    [id number]  OT SELF CARE/MGMT/TRAIN EA 15 MIN 5/6/2022 Tesha Charles OT GO 1               Tesha Charles OT  5/6/2022

## 2022-05-06 NOTE — THERAPY TREATMENT NOTE
Patient Name: Zaina Martinez  : 1965    MRN: [id number]                              Today's Date: 2022       Admit Date: 2022    Visit Dx:     ICD-10-CM ICD-9-CM   1. Acute metabolic encephalopathy  G93.41 348.31   2. ESRD (end stage renal disease) on dialysis ([facility])  N18.6 585.6    Z99.2 V45.11   3. Acute hypoxemic respiratory failure ([facility])  J96.01 518.81   4. Hyperkalemia  E87.5 276.7   5. ESRD (end stage renal disease) ([facility])  N18.6 585.6     Patient Active Problem List   Diagnosis   • Renal insufficiency   • Hypertensive disorder   • Hypothyroidism   • Acute DM type 2 causing complication ([facility])   • Rheumatoid arthritis ([facility])   • Angioedema   • Esophageal dysmotility   • Anemia   • Medically noncompliant   • Myocardial infarction due to demand ischemia ([facility])   • Enteritis   • PRES (posterior reversible encephalopathy syndrome)   • Urine retention   • Klebsiella infection   • Superficial thrombophlebitis   • Generalized weakness   • ESRD (end stage renal disease) ([facility])   • CAD (coronary artery disease)   • Abnormal urinalysis   • Acute on chronic diastolic CHF (congestive heart failure) ([facility])   • Pyelonephritis   • Calculus of gallbladder with acute on chronic cholecystitis without obstruction   • Pleural effusion on right   • Anemia due to chronic kidney disease, on chronic dialysis ([facility])   • Abnormal findings on diagnostic imaging of other specified body structures   • Acute upper respiratory infection   • Agitation   • Alkaline phosphatase raised   • Casts present in urine   • Cellulitis of toe   • Hip pain   • Community acquired pneumonia   • Depressive disorder   • Diarrhea of presumed infectious origin   • Difficult or painful urination   • Disease due to severe acute respiratory syndrome coronavirus 2 (SARS-CoV-2)   • Dyspnea   • Encounter for follow-up examination after completed treatment for conditions other than malignant neoplasm   • H/O: hypothyroidism   • Hyperlipidemia   •  Hypomagnesemia   • Intractable vomiting with nausea   • Leukocytosis   • Luetscher's syndrome   • Need for influenza vaccination   • Restless legs   • Noncompliance with treatment   • Shoulder pain   • Urinary tract infectious disease   • Metabolic encephalopathy   • Abnormal findings on diagnostic imaging of abdomen   • Status post cholecystectomy   • Hyponatremia   • Leukocytosis   • Acute metabolic encephalopathy   • Encephalopathy, toxic   • Acute CVA (cerebrovascular accident) ([facility])   • Intracranial hemorrhage ([facility])     Past Medical History:   Diagnosis Date   • Acute CVA (cerebrovascular accident) ([facility]) 5/1/2022   • Acute on chronic diastolic CHF (congestive heart failure) ([facility])    • CAD (coronary artery disease) 12/20/2021   • Diabetes ([facility]CC)    • Disease of thyroid gland    • GERD (gastroesophageal reflux disease)    • Hyperlipidemia 11/30/2018   • Hypertension    • Rheumatoid arthritis ([facility])      Past Surgical History:   Procedure Laterality Date   • CHOLECYSTECTOMY WITH INTRAOPERATIVE CHOLANGIOGRAM N/A 1/10/2022    Procedure: Laparoscopic cholecystectomy with intraoperative cholangiogram;  Surgeon: Ramana Raygoza MD;  Location: [facility];  Service: General;  Laterality: N/A;   • EYE SURGERY     • HYSTERECTOMY     • INSERTION HEMODIALYSIS CATHETER N/A 12/6/2021    Procedure: HEMODIALYSIS CATHETER INSERTION;  Surgeon: Keli Salazar MD;  Location: [facility] 18/19;  Service: Vascular;  Laterality: N/A;   • INSERTION HEMODIALYSIS CATHETER N/A 5/3/2022    Procedure: TUNNELED CATHETER PLACEMENT;  Surgeon: Keli Salazar MD;  Location: [facility];  Service: Vascular;  Laterality: N/A;      General Information     Row Name 05/06/22 1409          Physical Therapy Time and Intention    Document Type therapy note (daily note)  -CF     Mode of Treatment physical therapy;individual therapy  -CF     Row Name 05/06/22 6524          General Information    Patient Profile Reviewed yes  -CF      Existing Precautions/Restrictions fall  -CF     Row Name 05/06/22 1341          Cognition    Orientation Status (Cognition) oriented to;person;place  -CF     Row Name 05/06/22 1341          Safety Issues, Functional Mobility    Impairments Affecting Function (Mobility) balance;endurance/activity tolerance;postural/trunk control;strength;pain;cognition  -CF           User Key  (r) = Recorded By, (t) = Taken By, (c) = Cosigned By    Initials Name Provider Type     Samantha Zarate, VICKIE Physical Therapist               Mobility     Row Name 05/06/22 1342          Bed Mobility    Bed Mobility supine-sit;sit-supine  -CF     Supine-Sit [place] (Bed Mobility) minimum assist (75% patient effort)  -CF     Sit-Supine [place] (Bed Mobility) minimum assist (75% patient effort)  -CF     Assistive Device (Bed Mobility) head of bed elevated  -     Row Name 05/06/22 1342          Sit-Stand Transfer    Sit-Stand [place] (Transfers) minimum assist (75% patient effort)  -CF     Assistive Device (Sit-Stand Transfers) walker, front-wheeled  -     Row Name 05/06/22 1342          Gait/Stairs (Locomotion)    [place] Level (Gait) minimum assist (75% patient effort)  -CF     Assistive Device (Gait) walker, front-wheeled  -CF     Distance in Feet (Gait) 25'  -CF     Deviations/Abnormal Patterns (Gait) kenn decreased;gait speed decreased  -     Bilateral Gait Deviations forward flexed posture  -     Comment, (Gait/Stairs) Slow pace, mild unsteadiness  -CF           User Key  (r) = Recorded By, (t) = Taken By, (c) = Cosigned By    Initials Name Provider Type     Samantha Zarate PT Physical Therapist               Obj/Interventions     Row Name 05/06/22 1342          Balance    Balance Assessment standing dynamic balance;standing static balance  -CF     Static Sitting Balance standby assist  -CF     Dynamic Sitting Balance standby assist  -CF     Position, Sitting Balance unsupported;sitting edge of bed   "-CF     Static Standing Balance minimal assist  -CF     Dynamic Standing Balance minimal assist  -CF     Position/Device Used, Standing Balance supported;walker, front-wheeled  -CF           User Key  (r) = Recorded By, (t) = Taken By, (c) = Cosigned By    Initials Name Provider Type    CF Samantha Zarate, VICKIE Physical Therapist               Goals/Plan    No documentation.                Clinical Impression     Row Name 05/06/22 1343          Pain    Pretreatment Pain Rating 5/10  -CF     Posttreatment Pain Rating 5/10  -CF     Pre/Posttreatment Pain Comment reports  \"pain all over\"  -CF     Row Name 05/06/22 1343          Plan of Care Review    Plan of Care Reviewed With patient  -CF     Outcome Evaluation Pt participated with PT this AM. Pt progressing well with mobilty as she required less assist for bed mobility, transfers and was able to ambulate 25' with use of walker. Continues to demo some confusion but able to follow commands. PT will continue to follow and progress as able. Plan to follow up monday.  -CF     Row Name 05/06/22 1343          Vital Signs    O2 Delivery Pre Treatment room air  -CF     O2 Delivery Intra Treatment room air  -CF     O2 Delivery Post Treatment room air  -CF     Row Name 05/06/22 1343          Positioning and Restraints    Pre-Treatment Position in bed  -CF     Post Treatment Position bed  no recliner chair in room, notified RN  -CF     In Bed notified nsg;call light within reach;encouraged to call for assist;exit alarm on;fowlers  -CF           User Key  (r) = Recorded By, (t) = Taken By, (c) = Cosigned By    Initials Name Provider Type    Samantha Pina PT Physical Therapist               Outcome Measures     Row Name 05/06/22 1349          How much help from another person do you currently need...    Turning from your back to your side while in flat bed without using bedrails? 3  -CF     Moving from lying on back to sitting on the side of a flat bed without bedrails? 3  " -CF     Moving to and from a bed to a chair (including a wheelchair)? 3  -CF     Standing up from a chair using your arms (e.g., wheelchair, bedside chair)? 3  -CF     Climbing 3-5 steps with a railing? 2  -CF     To walk in hospital room? 3  -CF     AM-PAC 6 Clicks Score (PT) 17  -CF     Highest level of mobility 5 --> Static standing  -CF     Row Name 05/06/22 1345          Functional Assessment    Outcome Measure Options AM-PAC 6 Clicks Basic Mobility (PT)  -CF           User Key  (r) = Recorded By, (t) = Taken By, (c) = Cosigned By    Initials Name Provider Type    CF Samantha Zarate, PT Physical Therapist                             Physical Therapy Education                 Title: PT OT SLP Therapies (In Progress)     Topic: Physical Therapy (Done)     Point: Mobility training (Done)     Learning Progress Summary           Patient Acceptance, E, VU,NR by  at 5/6/2022 1346    Acceptance, E,TB,D, VU,DU,NR by  at 5/4/2022 1332    Nonacceptance, E,TB, NR by  at 5/2/2022 1121                   Point: Home exercise program (Done)     Learning Progress Summary           Patient Acceptance, E, VU,NR by  at 5/6/2022 1346    Acceptance, E,TB,D, VU,DU,NR by  at 5/4/2022 1332    Nonacceptance, E,TB, NR by KA at 5/2/2022 1121                   Point: Body mechanics (Done)     Learning Progress Summary           Patient Acceptance, E, VU,NR by  at 5/6/2022 1346    Acceptance, E,TB,D, VU,DU,NR by  at 5/4/2022 1332    Nonacceptance, E,TB, NR by  at 5/2/2022 1121                   Point: Precautions (Done)     Learning Progress Summary           Patient Acceptance, E, VU,NR by  at 5/6/2022 1346    Acceptance, E,TB,D, VU,DU,NR by  at 5/4/2022 1332    Nonacceptance, E,TB, NR by  at 5/2/2022 1121                               User Key     Initials Effective Dates Name Provider Type Discipline     06/16/21 -  Samantha Zarate, PT Physical Therapist PT    [facility] 03/11/22 -  Ángel, Mary, PT Student PT  Student PT              PT Recommendation and Plan     Plan of Care Reviewed With: patient  Outcome Evaluation: Pt participated with PT this AM. Pt progressing well with mobilty as she required less assist for bed mobility, transfers and was able to ambulate 25' with use of walker. Continues to demo some confusion but able to follow commands. PT will continue to follow and progress as able. Plan to follow up monday.     Time Calculation:    PT Charges     Row Name 05/06/22 1341             Time Calculation    Start Time 0918  -CF      Stop Time 0934  -CF      Time Calculation (min) 16 min  -CF      PT Received On 05/06/22  -CF      PT - Next Appointment 05/09/22  -CF            User Key  (r) = Recorded By, (t) = Taken By, (c) = Cosigned By    Initials Name Provider Type    CF Samantha Zarate, VICKIE Physical Therapist              Therapy Charges for Today     Code Description Service Date Service Provider Modifiers Qty    [id number]  PT THERAPEUTIC ACT EA 15 MIN 5/6/2022 Samantha Zarate, PT GP 1    [id number]  PT THER SUPP EA 15 MIN 5/6/2022 Samantha Zarate, PT GP 1          PT G-Codes  Outcome Measure Options: AM-PAC 6 Clicks Basic Mobility (PT)  AM-PAC 6 Clicks Score (PT): 17  AM-PAC 6 Clicks Score (OT): 12  Modified [place] Scale: 5 - Severe disability.  Bedridden, incontinent, and requiring constant nursing care and attention.    Samantha Zarate PT  5/6/2022

## 2022-05-06 NOTE — CASE MANAGEMENT/SOCIAL WORK
Continued Stay Note  [facility]     Patient Name: Zaina Martinez  MRN: [id number]  Today's Date: 5/6/2022    Admit Date: 4/27/2022     Discharge Plan     Row Name 05/06/22 1256       Plan    Plan Home with spouse and HH vs ?    Patient/Family in Agreement with Plan yes    Plan Comments CCP called pts spouse [phone] via outbound call, introduced self and explained CCP role. Explained will be following pt on 5n for dc planning. Reviewed 5/4/22 Pt notes recommending SNF. He states pt is doing much better and they hope to have her come home. CCP did offered SNF with onsite HD facilities ([facility] and [facility]) and could make referrals to check availability, versus SNF without HD onsite. He declines and states he will need to speak with pt for further dc planning. He states pt has been to [facility] in the past and they wish to not go there. CCP also discussed [facility]. He states he will need to speak with pt first. CCP explained will follow up on Monday for dc planning. Awaiting PT notes for today. Gaye REYES/CCP               Discharge Codes    No documentation.               Expected Discharge Date and Time     Expected Discharge Date Expected Discharge Time    May 6, 2022             Guillermina Duffy RN

## 2022-05-06 NOTE — PLAN OF CARE
Goal Outcome Evaluation:  Plan of Care Reviewed With: patient        Progress: improving  Outcome Evaluation: Patient alert and oriented x 4.  Report of generalized pain and tylenol given per MD order.  PATRICK.  PAULINA.

## 2022-05-06 NOTE — PLAN OF CARE
Goal Outcome Evaluation:  Plan of Care Reviewed With: patient, friend        Progress: improving  Outcome Evaluation: Pt showing large improvements than previous OT encounter. She is more oriented and follows commands WFL. OT better able to functionally assess pt today due to improved cogntion. She reports weakness in LUE, however using it functionally with some extra time. She was able to work on EOB ADLs and UE ther ex with SBA for sitting balance today. She was able to complete bed mobility and stand with min A. She c/o of generalized weakness and is limited with activity tolerance today. OT discusses acute rehab at [facility] and pt is now appropriate for acute rehab. She would benefit from continued OT to address UE strength, endurance, ADLs, transfers.    OT wore a mask, eye protection, gloves, and completed hand hygiene.

## 2022-05-06 NOTE — PROGRESS NOTES
Adult Nutrition  Assessment/PES    Patient Name:  Zaina Martinez  YOB: 1965  MRN: [id number]  Admit Date:  4/27/2022    Assessment Date:  5/6/2022    Comments:  Nutrition f/u:    TF's d/c'd and diet advanced to Consistent CHO diet 5/4 and tolerating well with good appetite. Pt w/ 50-75% po intake at meals 5/5. Denies N/V. HD 5/5 with 3L removed.      Last BM recorded 4/30.    Recommend:  1. Change diet to Consistent CHO/Renal diet, will change order.    2. Stool softener or laxative to promote BM d/t last BM recorded 6 days ago.    Will follow per protocol.     Reason for Assessment     Row Name 05/06/22 0951          Reason for Assessment    Reason For Assessment follow-up protocol                Nutrition/Diet History     Row Name 05/06/22 0951          Nutrition/Diet History    Typical Intake (Food/Fluid/EN/PN) TF's d/c'd and diet advanced to Consistent CHO diet 5/4 and tolerating well with good appetite. Pt w/ 50-75% po intake at meals 5/5. Denies N/V. HD 5/5 with 3L removed.  Last BM recorded 4/30.                Anthropometrics     Row Name 05/06/22 0500          Anthropometrics    Weight 46 kg (101 lb 6.4 oz)                Labs/Tests/Procedures/Meds     Row Name 05/06/22 0953          Labs/Procedures/Meds    Lab Results Reviewed reviewed, pertinent     Lab Results Comments Na 135, Gluc 130/140/143, Crea 2.66, Phos 2.2, Alb 2.70            Diagnostic Tests/Procedures    Diagnostic Test/Procedure Reviewed reviewed, pertinent     Diagnostic Test/Procedures Comments HD 5/5 w/ 3L removed            Medications    Pertinent Medications Reviewed reviewed, pertinent     Pertinent Medications Comments insulin, synthroid, protonix, vanc                Physical Findings     Row Name 05/06/22 0956          Physical Findings    Overall Physical Appearance scab, bruised, abd incision                Estimated/Assessed Needs - Anthropometrics     Row Name 05/06/22 0500          Anthropometrics    Weight 46 kg  (101 lb 6.4 oz)                Nutrition Prescription Ordered     Row Name 05/06/22 0956          Nutrition Prescription PO    Current PO Diet Regular     Fluid Consistency Thin     Common Modifiers Consistent Carbohydrate                Evaluation of Received Nutrient/Fluid Intake     Row Name 05/06/22 0957          PO Evaluation    Number of Meals 3     % PO Intake 50-75%                     Problem/Interventions:   Problem 1     Row Name 05/06/22 0957          Nutrition Diagnoses Problem 1    Problem 1 Nutrition Appropriate for Condition at this Time                      Intervention Goal     Row Name 05/06/22 0958          Intervention Goal    General Maintain nutrition;Disease management/therapy;Improved nutrition related lab(s);Meet nutritional needs for age/condition     PO Maintain intake;PO intake (%)     PO Intake % 75 %     Weight Appropriate weight gain                Nutrition Intervention     Row Name 05/06/22 0958          Nutrition Intervention    RD/Tech Action Care plan reviewd;Follow Tx progress;Interview for preference;Menu provided                Nutrition Prescription     Row Name 05/06/22 0959          Nutrition Prescription PO    PO Prescription Begin/change diet     Begin/Change Diet to Regular     Fluid Consistency Thin     Common Modifiers Consistent Carbohydrate;Renal     New PO Prescription Ordered? Yes                Education/Evaluation     Row Name 05/06/22 0959          Education    Education Will Instruct as appropriate            Monitor/Evaluation    Monitor Per protocol;Weight;I&O;Skin status;Symptoms;PO intake;Pertinent labs                 Electronically signed by:  Yashira Garcia RD  05/06/22 10:00 EDT

## 2022-05-06 NOTE — PROGRESS NOTES
LOS: 9 days     Chief Complaint: Sepsis    Interval History: Patient reports he is doing much better this morning.  Denies any fevers or chills.  Denies any nausea, vomiting, diarrhea.  States she is tolerating her dialysis and antibiotics well.    Fever curve overall improving.  Blood cultures remain negative.  Leukocytosis resolved today.    Vital Signs  Temp:  [97.4 °F (36.3 °C)-98.7 °F (37.1 °C)] 98.7 °F (37.1 °C)  Heart Rate:  [69-79] 79  Resp:  [16] 16  BP: (134-173)/(78-82) 161/82    Physical Exam:  General: In no acute distress  Cardiovascular: RRR, no LE edema   Respiratory: Clear to ascultation bilaterally  GI: Soft, NT/ND, + bowel sounds bilaterally  Skin: No rashes.  New catheter site clean and intact.    Antibiotics:  Vancomycin dosing per pharmacy     Results Review:    Lab Results   Component Value Date    WBC 7.95 05/06/2022    HGB 9.5 (L) 05/06/2022    HCT 29.4 (L) 05/06/2022    MCV 83.5 05/06/2022     05/06/2022     Lab Results   Component Value Date    GLUCOSE 130 (H) 05/06/2022    BUN 17 05/06/2022    CREATININE 2.66 (H) 05/06/2022    EGFRIFNONA 14 (L) 02/28/2022    EGFRIFAFRI  01/13/2022      Comment:      <15 Indicative of kidney failure.    BCR 6.4 (L) 05/06/2022    CO2 23.7 05/06/2022    CALCIUM 8.0 (L) 05/06/2022    ALBUMIN 2.70 (L) 05/06/2022    AST 30 04/27/2022    ALT 14 04/27/2022       Microbiology:  4/27 urine culture <10K MRSA  4/28 blood cultures NGTD   4/28 catheter tip culture from hemodialysis catheter with MRSA  5/5 blood cultures no growth to date    Assessment/Plan   #Hemodialysis catheter related infection with MRSA, now removed  #Left frontal ICH  #Acute encephalopathy  #Type 2 diabetes  #ESRD on hemodialysis    Continue intermittent dosed IV vancomycin goal -600 for MRSA infection associated with her hemodialysis catheter. Appreciate pharmacy's help with dosing.  Appreciate cardiology's eval for ANJEL and are planning to reevaluate on Monday.  Tentative plan  would be for 4 weeks of IV vancomycin with dialysis however we will follow-up possible ANJEL as this may alter her duration.    ID will follow.

## 2022-05-06 NOTE — PLAN OF CARE
Goal Outcome Evaluation:  Plan of Care Reviewed With: patient           Outcome Evaluation: Pt participated with PT this AM. Pt progressing well with mobilty as she required less assist for bed mobility, transfers and was able to ambulate 25' with use of walker. Continues to demo some confusion but able to follow commands. PT will continue to follow and progress as able. Plan to follow up monday.

## 2022-05-06 NOTE — PROGRESS NOTES
[facility] Progress Note      Patient Name: Zaina Martinez  : 1965  MRN: [id number]  Primary Care Physician:  Karson Oreilly MD  Date of admission: 2022    Subjective     Interval History:   Follow up ESRD  Appetite is fair; no shortness of breath on room air  Last HD was yesterday, though usual schedule is TTS    Review of Systems:   As noted above    Objective     Vitals:   Temp:  [97.4 °F (36.3 °C)-98.7 °F (37.1 °C)] 98.7 °F (37.1 °C)  Heart Rate:  [69-79] 79  Resp:  [16] 16  BP: (134-161)/(78-82) 161/82  No intake or output data in the 24 hours ending 22 1545    Physical Exam:   General: Awake, chronically ill, blunted but still oriented  Skin:dry. No rash  HEENT: AT/NC, oral mucosa dry  Neck:  RIJ TDC  Lungs:  clear to auscultation. Unlabored.   Heart: RRR no s3 or rub  Abdomen: + bs, soft, nontender, ND   Extremities: no edema.    Neuro: Calm. Speech fluent.   Moves all 4 ext.     Scheduled Meds:     amLODIPine, 10 mg, Oral, Q24H  heparin (porcine), 3,000 Units, Intracatheter, Once  hydrALAZINE, 10 mg, Oral, Q8H  insulin lispro, 0-14 Units, Subcutaneous, Q6H  levothyroxine, 125 mcg, Oral, Q AM  pantoprazole, 40 mg, Intravenous, Q AM  rOPINIRole, 0.75 mg, Oral, Nightly  sertraline, 100 mg, Oral, Daily  sodium chloride, 10 mL, Intravenous, Q12H  tamsulosin, 0.4 mg, Oral, Daily  Vancomycin Pharmacy Intermittent/Pulse Dosing, , Does not apply, Daily      IV Meds:   Pharmacy to dose vancomycin,         Results Reviewed:   I have personally reviewed the results from the time of this admission to 2022 15:45 EDT     Results from last 7 days   Lab Units 22  0622 22  0557 22  0320   SODIUM mmol/L 135* 131* 132*   POTASSIUM mmol/L 3.9 3.8 3.9   CHLORIDE mmol/L 99 95* 99   CO2 mmol/L 23.7 25.0 19.0*   BUN mg/dL 17 32* 19   CREATININE mg/dL 2.66* 3.85* 2.93*   CALCIUM mg/dL 8.0* 7.8* 7.9*   GLUCOSE mg/dL 130* 166* 206*       Estimated Creatinine  Clearance: 17.1 mL/min (A) (by C-G formula based on SCr of 2.66 mg/dL (H)).    Results from last 7 days   Lab Units 05/06/22  0622 05/05/22  0557 05/04/22 0320 05/03/22  0340   MAGNESIUM mg/dL  --   --   --  1.8   PHOSPHORUS mg/dL 2.2* 3.3 2.6 3.7             Results from last 7 days   Lab Units 05/06/22  0622 05/05/22  0557 05/04/22 0320 05/03/22  0340 05/02/22  0329   WBC 10*3/mm3 7.95 11.38* 17.72* 16.34* 10.55   HEMOGLOBIN g/dL 9.5* 7.9* 9.6* 10.0* 10.8*   PLATELETS 10*3/mm3 313 245 214 193 203       Results from last 7 days   Lab Units 04/29/22  2317   INR  0.97       Assessment / Plan     ASSESSMENT:  1.  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 Vol fine; low phos d/t poor nutrition  2.  Intracerebral bleed and altered mental status.  MS improving.   3.  Infected TDC, s/p removal 5/1. Staph aureus . On vanc, and WBC better.    4.  DM2    5.  Coronary artery disease  6.  Acute on chronic diastolic dysfunction . 3 kg off with HD this am.  BP better.   7.  Anemia of CKD, on long-acting ANDRAE as an outpatient.  8.  HTN, not controlled    PLAN:  1.  HD tomorrow, but will change back to MWF schedule next week  2.  Na phos IV  3.  Increase hydralazine    Alejo Lange MD  05/06/22  15:45 EDT    [facility]  [phone]

## 2022-05-06 NOTE — PROGRESS NOTES
[facility]    [phone]   LOS: 9 days     Name: Zaina Martinez  Age/Sex: 56 y.o. female  :  1965        PCP: Karson Oreilly MD  Chief Complaint   Patient presents with   • Altered Mental Status     PT WITH AMS, LAST SEEN NORMAL APPROX. 0800 TODAY; PT NOT TALKING, LEFT  WEAKNESS; PT IS ON DIALYSIS BUT HAS MISSED HER LAST APPOINTMENT; PT WEARING FACE MASK      Subjective   She feels okay today on my evaluation she denies new complaints but per nursing staff she is had issues with restless legs this afternoon.  She was awake alert conversant and cooperative for evaluation  General: No Fever or Chills, Cardiac: No Chest Pain or Palpitations, Resp: No Cough or SOA, GI: No Nausea, Vomiting, or Diarrhea and Other: No bleeding    amLODIPine, 10 mg, Oral, Q24H  heparin (porcine), 3,000 Units, Intracatheter, Once  hydrALAZINE, 10 mg, Oral, Q8H  insulin lispro, 0-14 Units, Subcutaneous, Q6H  levothyroxine, 125 mcg, Oral, Q AM  pantoprazole, 40 mg, Intravenous, Q AM  rOPINIRole, 0.75 mg, Oral, Nightly  rOPINIRole, 0.75 mg, Oral, Once  sertraline, 100 mg, Oral, Daily  sodium chloride, 10 mL, Intravenous, Q12H  tamsulosin, 0.4 mg, Oral, Daily  Vancomycin Pharmacy Intermittent/Pulse Dosing, , Does not apply, Daily      Pharmacy to dose vancomycin,         Objective   Vital Signs  Temp:  [97.4 °F (36.3 °C)-98.7 °F (37.1 °C)] 98.7 °F (37.1 °C)  Heart Rate:  [69-79] 79  Resp:  [16] 16  BP: (134-161)/(78-82) 161/82  Body mass index is 18.54 kg/m².  No intake or output data in the 24 hours ending 22 1304    Physical Exam  Vitals and nursing note reviewed.   Constitutional:       Comments: Chronically ill-appearing   Cardiovascular:      Rate and Rhythm: Normal rate and regular rhythm.   Pulmonary:      Effort: No respiratory distress.      Breath sounds: Normal breath sounds.   Abdominal:      General: Bowel sounds are normal. There is no distension.      Palpations: Abdomen is soft.    Neurological:      General: No focal deficit present.      Mental Status: She is alert.           Results Review:       I reviewed the patient's new clinical results.  Results from last 7 days   Lab Units 05/06/22 0622 05/05/22 0557 05/04/22 0320 05/03/22 0340 05/02/22 0329 05/01/22  1050 04/29/22  2317   WBC 10*3/mm3 7.95 11.38* 17.72* 16.34* 10.55 12.18* 17.03*   HEMOGLOBIN g/dL 9.5* 7.9* 9.6* 10.0* 10.8* 10.1* 11.0*   PLATELETS 10*3/mm3 313 245 214 193 203 172 150     Results from last 7 days   Lab Units 05/06/22 0622 05/05/22 0557 05/04/22 0320 05/03/22 0340 05/02/22  0329 05/01/22  1050 04/30/22  0327   SODIUM mmol/L 135* 131* 132* 129* 130* 132* 130*   POTASSIUM mmol/L 3.9 3.8 3.9 4.6 3.9 4.6 4.9   CHLORIDE mmol/L 99 95* 99 96* 95* 96* 93*   CO2 mmol/L 23.7 25.0 19.0* 21.0* 22.3 21.3* 22.0   BUN mg/dL 17 32* 19 39* 26* 41* 36*   CREATININE mg/dL 2.66* 3.85* 2.93* 5.04* 4.23* 6.22* 4.89*   CALCIUM mg/dL 8.0* 7.8* 7.9* 7.9* 7.9* 8.0* 8.0*   MAGNESIUM mg/dL  --   --   --  1.8  --   --   --    PHOSPHORUS mg/dL 2.2* 3.3 2.6 3.7 3.2  --  3.8   Estimated Creatinine Clearance: 17.1 mL/min (A) (by C-G formula based on SCr of 2.66 mg/dL (H)).  Lab Results   Component Value Date    HGBA1C 5.80 (H) 04/28/2022    HGBA1C 5.80 (H) 02/24/2022    HGBA1C 6.90 (H) 01/10/2022     Glucose   Date/Time Value Ref Range Status   05/06/2022 1220 247 (H) 70 - 130 mg/dL Final     Comment:     Meter: [id number] : [id number] Anthony Nick NA   05/06/2022 0618 140 (H) 70 - 130 mg/dL Final     Comment:     Meter: [id number] : [id number] Harris SAAVEDRA   05/05/2022 2022 143 (H) 70 - 130 mg/dL Final     Comment:     Meter: [id number] : [id number] Iglesias Susy QUENTIN   05/05/2022 1609 183 (H) 70 - 130 mg/dL Final     Comment:     Meter: [id number] : [id number] Tawnya SAAVEDRA   05/05/2022 1237 103 70 - 130 mg/dL Final     Comment:     Meter: [id number] : [id number] AgWaldo Cary QUENTIN   05/05/2022 0619  174 (H) 70 - 130 mg/dL Final     Comment:     Meter: [id number] : [id number] Heron SAAVEDRA   05/04/2022 2342 127 70 - 130 mg/dL Final     Comment:     Meter: [id number] : [id number] Heron SAAVEDRA   05/04/2022 1759 190 (H) 70 - 130 mg/dL Final     Comment:     Meter: [id number] : [id number] KaydenCaden Flavio SAAVEDRA         Assessment/Plan   Active Hospital Problems    Diagnosis  POA   • **Encephalopathy, toxic [G92.9]  Unknown   • Acute CVA (cerebrovascular accident) (HCC) [I63.9]  Unknown   • Intracranial hemorrhage (HCC) [I62.9]  Unknown   • Acute metabolic encephalopathy [G93.41]  Yes   • Leukocytosis [D72.829]  Yes   • Acute on chronic diastolic CHF (congestive heart failure) (HCC) [I50.33]  Yes   • CAD (coronary artery disease) [I25.10]  Yes   • ESRD (end stage renal disease) (HCC) [N18.6]  Yes   • Hypertensive disorder [I10]  Yes   • Acute DM type 2 causing complication (HCC) [E11.8]  Yes      Resolved Hospital Problems   No resolved problems to display.       PLAN  This is a 56-year-old female with a history end-stage renal disease who presented to the hospital after missing some dialysis and unfortunately has had a rather complicated hospitalization with bacteremia and infection in her tunneled dialysis catheter as well as acute stroke during the hospitalization requiring tPA.  -Luckily she seems to be doing better.  From a mentation standpoint she seems much more appropriate than she was a few days ago.  I think it is probably okay to proceed with a ANJEL when felt to be appropriate from cardiology's perspective  -Appreciate infectious diseases input regarding the antibiotics.  -Nephrology following for dialysis  -Blood sugars are stable here on present management  -Patient is a full code    Disposition  To be determined      Osmar King MD  [facility]  05/06/22  13:04 EDT

## 2022-05-06 NOTE — PROGRESS NOTES
"[facility] Clinical Pharmacy Services: Vancomycin Monitoring Note    Zaina Martinez is a 56 y.o. female who is on day 8 of pharmacy to dose vancomycin for SSTI per Dr. Soriano's request. Dr. Canas following for sepsis and HD catheter-related infection w/MRSA.    Previous Vancomycin Dose: 500 mg IV once on 5/5 at 1706    Results from last 7 days   Lab Units 05/04/22  0320 05/03/22  0340 05/02/22  0329   VANCOMYCIN RM mcg/mL 21.70 16.10 19.70     Vitals/Labs  Ht: 157.5 cm (62.01\"); Wt: 46 kg (101 lb 6.4 oz)   Temp Readings from Last 1 Encounters:   05/06/22 98.7 °F (37.1 °C) (Oral)     Estimated Creatinine Clearance: 17.1 mL/min (A) (by C-G formula based on SCr of 2.66 mg/dL (H)).   Dialysis TTS    Results from last 7 days   Lab Units 05/06/22  0622 05/05/22  0557 05/04/22  0320   CREATININE mg/dL 2.66* 3.85* 2.93*   WBC 10*3/mm3 7.95 11.38* 17.72*     Assessment/Plan    Vancomycin dosed post-HD yesterday; anticipate HD tomorrow; no dose indicated today  Next Level Date and Time: Pre-HD Vanc Random with AM labs on 5/7  We will continue to monitor patient changes and renal function     Thank you for involving pharmacy in this patient's care. Please contact pharmacy with any questions or concerns.       Jeremy Nguyen III, PharmD  Clinical Pharmacist    "

## 2022-05-07 LAB
ALBUMIN SERPL-MCNC: 2.8 G/DL (ref 3.5–5.2)
ANION GAP SERPL CALCULATED.3IONS-SCNC: 12 MMOL/L (ref 5–15)
BASOPHILS # BLD AUTO: 0.06 10*3/MM3 (ref 0–0.2)
BASOPHILS NFR BLD AUTO: 0.7 % (ref 0–1.5)
BUN SERPL-MCNC: 27 MG/DL (ref 6–20)
BUN/CREAT SERPL: 7.9 (ref 7–25)
CALCIUM SPEC-SCNC: 7.9 MG/DL (ref 8.6–10.5)
CHLORIDE SERPL-SCNC: 98 MMOL/L (ref 98–107)
CO2 SERPL-SCNC: 24 MMOL/L (ref 22–29)
CREAT SERPL-MCNC: 3.4 MG/DL (ref 0.57–1)
DEPRECATED RDW RBC AUTO: 52 FL (ref 37–54)
EGFRCR SERPLBLD CKD-EPI 2021: 15.3 ML/MIN/1.73
EOSINOPHIL # BLD AUTO: 0.1 10*3/MM3 (ref 0–0.4)
EOSINOPHIL NFR BLD AUTO: 1.2 % (ref 0.3–6.2)
ERYTHROCYTE [DISTWIDTH] IN BLOOD BY AUTOMATED COUNT: 17.2 % (ref 12.3–15.4)
GLUCOSE BLDC GLUCOMTR-MCNC: 220 MG/DL (ref 70–130)
GLUCOSE BLDC GLUCOMTR-MCNC: 255 MG/DL (ref 70–130)
GLUCOSE BLDC GLUCOMTR-MCNC: 310 MG/DL (ref 70–130)
GLUCOSE BLDC GLUCOMTR-MCNC: 78 MG/DL (ref 70–130)
GLUCOSE SERPL-MCNC: 74 MG/DL (ref 65–99)
HCT VFR BLD AUTO: 30.3 % (ref 34–46.6)
HGB BLD-MCNC: 9.8 G/DL (ref 12–15.9)
IMM GRANULOCYTES # BLD AUTO: 0.11 10*3/MM3 (ref 0–0.05)
IMM GRANULOCYTES NFR BLD AUTO: 1.3 % (ref 0–0.5)
LYMPHOCYTES # BLD AUTO: 1.84 10*3/MM3 (ref 0.7–3.1)
LYMPHOCYTES NFR BLD AUTO: 21.6 % (ref 19.6–45.3)
MCH RBC QN AUTO: 26.9 PG (ref 26.6–33)
MCHC RBC AUTO-ENTMCNC: 32.3 G/DL (ref 31.5–35.7)
MCV RBC AUTO: 83.2 FL (ref 79–97)
MONOCYTES # BLD AUTO: 0.78 10*3/MM3 (ref 0.1–0.9)
MONOCYTES NFR BLD AUTO: 9.2 % (ref 5–12)
NEUTROPHILS NFR BLD AUTO: 5.61 10*3/MM3 (ref 1.7–7)
NEUTROPHILS NFR BLD AUTO: 66 % (ref 42.7–76)
NRBC BLD AUTO-RTO: 0 /100 WBC (ref 0–0.2)
PHOSPHATE SERPL-MCNC: 2.5 MG/DL (ref 2.5–4.5)
PLATELET # BLD AUTO: 366 10*3/MM3 (ref 140–450)
PMV BLD AUTO: 10.3 FL (ref 6–12)
POTASSIUM SERPL-SCNC: 4 MMOL/L (ref 3.5–5.2)
RBC # BLD AUTO: 3.64 10*6/MM3 (ref 3.77–5.28)
SODIUM SERPL-SCNC: 134 MMOL/L (ref 136–145)
TSH SERPL DL<=0.05 MIU/L-ACNC: 134 UIU/ML (ref 0.27–4.2)
VANCOMYCIN SERPL-MCNC: 15.9 MCG/ML (ref 5–40)
WBC NRBC COR # BLD: 8.5 10*3/MM3 (ref 3.4–10.8)

## 2022-05-07 PROCEDURE — 80069 RENAL FUNCTION PANEL: CPT | Performed by: INTERNAL MEDICINE

## 2022-05-07 PROCEDURE — 82962 GLUCOSE BLOOD TEST: CPT

## 2022-05-07 PROCEDURE — 63710000001 INSULIN LISPRO (HUMAN) PER 5 UNITS: Performed by: INTERNAL MEDICINE

## 2022-05-07 PROCEDURE — 85025 COMPLETE CBC W/AUTO DIFF WBC: CPT | Performed by: HOSPITALIST

## 2022-05-07 PROCEDURE — 99233 SBSQ HOSP IP/OBS HIGH 50: CPT | Performed by: NURSE PRACTITIONER

## 2022-05-07 PROCEDURE — 25010000002 HEPARIN (PORCINE) PER 1000 UNITS: Performed by: INTERNAL MEDICINE

## 2022-05-07 PROCEDURE — 25010000002 VANCOMYCIN PER 500 MG: Performed by: HOSPITALIST

## 2022-05-07 PROCEDURE — 80202 ASSAY OF VANCOMYCIN: CPT | Performed by: INTERNAL MEDICINE

## 2022-05-07 PROCEDURE — 5A1D70Z PERFORMANCE OF URINARY FILTRATION, INTERMITTENT, LESS THAN 6 HOURS PER DAY: ICD-10-PCS | Performed by: STUDENT IN AN ORGANIZED HEALTH CARE EDUCATION/TRAINING PROGRAM

## 2022-05-07 PROCEDURE — 84443 ASSAY THYROID STIM HORMONE: CPT | Performed by: NURSE PRACTITIONER

## 2022-05-07 RX ORDER — TRAZODONE HYDROCHLORIDE 50 MG/1
50 TABLET ORAL ONCE AS NEEDED
Status: COMPLETED | OUTPATIENT
Start: 2022-05-07 | End: 2022-05-08

## 2022-05-07 RX ORDER — ALBUMIN (HUMAN) 12.5 G/50ML
12.5 SOLUTION INTRAVENOUS AS NEEDED
Status: ACTIVE | OUTPATIENT
Start: 2022-05-07 | End: 2022-05-07

## 2022-05-07 RX ORDER — HYDRALAZINE HYDROCHLORIDE 50 MG/1
50 TABLET, FILM COATED ORAL EVERY 8 HOURS SCHEDULED
Status: DISCONTINUED | OUTPATIENT
Start: 2022-05-07 | End: 2022-05-08

## 2022-05-07 RX ADMIN — INSULIN LISPRO 5 UNITS: 100 INJECTION, SOLUTION INTRAVENOUS; SUBCUTANEOUS at 12:17

## 2022-05-07 RX ADMIN — Medication 10 ML: at 20:56

## 2022-05-07 RX ADMIN — AMLODIPINE BESYLATE 10 MG: 10 TABLET ORAL at 08:28

## 2022-05-07 RX ADMIN — ROPINIROLE HYDROCHLORIDE 0.75 MG: 0.5 TABLET, FILM COATED ORAL at 20:56

## 2022-05-07 RX ADMIN — HYDRALAZINE HYDROCHLORIDE 25 MG: 25 TABLET, FILM COATED ORAL at 06:08

## 2022-05-07 RX ADMIN — PANTOPRAZOLE SODIUM 40 MG: 40 INJECTION, POWDER, FOR SOLUTION INTRAVENOUS at 06:09

## 2022-05-07 RX ADMIN — Medication 10 ML: at 08:29

## 2022-05-07 RX ADMIN — VANCOMYCIN HYDROCHLORIDE 500 MG: 500 INJECTION, POWDER, LYOPHILIZED, FOR SOLUTION INTRAVENOUS at 20:54

## 2022-05-07 RX ADMIN — TAMSULOSIN HYDROCHLORIDE 0.4 MG: 0.4 CAPSULE ORAL at 08:28

## 2022-05-07 RX ADMIN — HEPARIN SODIUM 3800 UNITS: 1000 INJECTION INTRAVENOUS; SUBCUTANEOUS at 16:24

## 2022-05-07 RX ADMIN — INSULIN LISPRO 5 UNITS: 100 INJECTION, SOLUTION INTRAVENOUS; SUBCUTANEOUS at 01:01

## 2022-05-07 RX ADMIN — HYDRALAZINE HYDROCHLORIDE 50 MG: 50 TABLET, FILM COATED ORAL at 21:00

## 2022-05-07 RX ADMIN — LEVOTHYROXINE SODIUM 125 MCG: 0.12 TABLET ORAL at 06:09

## 2022-05-07 RX ADMIN — SERTRALINE 100 MG: 100 TABLET, FILM COATED ORAL at 08:29

## 2022-05-07 NOTE — PROGRESS NOTES
[facility]    [phone]   LOS: 10 days     Name: Zaina Martinez  Age/Sex: 56 y.o. female  :  1965        PCP: Karson Oreilly MD  Chief Complaint   Patient presents with   • Altered Mental Status     PT WITH AMS, LAST SEEN NORMAL APPROX. 0800 TODAY; PT NOT TALKING, LEFT  WEAKNESS; PT IS ON DIALYSIS BUT HAS MISSED HER LAST APPOINTMENT; PT WEARING FACE MASK      Subjective   Sheis feeling rough today, did not sleep well and issues RLS most of the afternoon.  Also with some nausea this AM  General: No Fever or Chills, Cardiac: No Chest Pain or Palpitations, Resp: No Cough or SOA, GI: No Nausea, Vomiting, or Diarrhea and Other: No bleeding    amLODIPine, 10 mg, Oral, Q24H  heparin (porcine), 3,000 Units, Intracatheter, Once  hydrALAZINE, 25 mg, Oral, Q8H  insulin lispro, 0-14 Units, Subcutaneous, Q6H  levothyroxine, 125 mcg, Oral, Q AM  pantoprazole, 40 mg, Intravenous, Q AM  rOPINIRole, 0.75 mg, Oral, Nightly  sertraline, 100 mg, Oral, Daily  sodium chloride, 10 mL, Intravenous, Q12H  tamsulosin, 0.4 mg, Oral, Daily  vancomycin, 500 mg, Intravenous, Once  Vancomycin Pharmacy Intermittent/Pulse Dosing, , Does not apply, Daily      Pharmacy to dose vancomycin,         Objective   Vital Signs  Temp:  [98.4 °F (36.9 °C)-98.7 °F (37.1 °C)] 98.5 °F (36.9 °C)  Heart Rate:  [83-84] 84  Resp:  [18] 18  BP: (161-178)/(83-90) 178/89  Body mass index is 19.43 kg/m².    Intake/Output Summary (Last 24 hours) at 2022 0859  Last data filed at 2022 0505  Gross per 24 hour   Intake 240 ml   Output 300 ml   Net -60 ml       Physical Exam  Vitals and nursing note reviewed.   Constitutional:       Comments: Chronically ill-appearing   Cardiovascular:      Rate and Rhythm: Normal rate and regular rhythm.   Pulmonary:      Effort: No respiratory distress.      Breath sounds: Normal breath sounds.   Abdominal:      General: Bowel sounds are normal. There is no distension.      Palpations: Abdomen is  soft.   Neurological:      General: No focal deficit present.      Mental Status: She is alert.           Results Review:       I reviewed the patient's new clinical results.  Results from last 7 days   Lab Units 05/07/22 0423 05/06/22 0622 05/05/22 0557 05/04/22 0320 05/03/22 0340 05/02/22 0329 05/01/22  1050   WBC 10*3/mm3 8.50 7.95 11.38* 17.72* 16.34* 10.55 12.18*   HEMOGLOBIN g/dL 9.8* 9.5* 7.9* 9.6* 10.0* 10.8* 10.1*   PLATELETS 10*3/mm3 366 313 245 214 193 203 172     Results from last 7 days   Lab Units 05/07/22 0423 05/06/22 0622 05/05/22 0557 05/04/22 0320 05/03/22 0340 05/02/22 0329 05/01/22  1050   SODIUM mmol/L 134* 135* 131* 132* 129* 130* 132*   POTASSIUM mmol/L 4.0 3.9 3.8 3.9 4.6 3.9 4.6   CHLORIDE mmol/L 98 99 95* 99 96* 95* 96*   CO2 mmol/L 24.0 23.7 25.0 19.0* 21.0* 22.3 21.3*   BUN mg/dL 27* 17 32* 19 39* 26* 41*   CREATININE mg/dL 3.40* 2.66* 3.85* 2.93* 5.04* 4.23* 6.22*   CALCIUM mg/dL 7.9* 8.0* 7.8* 7.9* 7.9* 7.9* 8.0*   MAGNESIUM mg/dL  --   --   --   --  1.8  --   --    PHOSPHORUS mg/dL 2.5 2.2* 3.3 2.6 3.7 3.2  --    Estimated Creatinine Clearance: 14.1 mL/min (A) (by C-G formula based on SCr of 3.4 mg/dL (H)).  Lab Results   Component Value Date    HGBA1C 5.80 (H) 04/28/2022    HGBA1C 5.80 (H) 02/24/2022    HGBA1C 6.90 (H) 01/10/2022     Glucose   Date/Time Value Ref Range Status   05/07/2022 0623 78 70 - 130 mg/dL Final     Comment:     Meter: [id number] : [id number] Harris Hamnzie NA   05/07/2022 0008 220 (H) 70 - 130 mg/dL Final     Comment:     Meter: [id number] : [id number] Harris Hamnzie QUENTIN   05/06/2022 1739 168 (H) 70 - 130 mg/dL Final     Comment:     Meter: [id number] : [id number] Anthony Nick NA   05/06/2022 1220 247 (H) 70 - 130 mg/dL Final     Comment:     Meter: [id number] : [id number] Anthony Nick NA   05/06/2022 0618 140 (H) 70 - 130 mg/dL Final     Comment:     Meter: [id number] : [id number] Harris Susy NA   05/05/2022 2022 143  (H) 70 - 130 mg/dL Final     Comment:     Meter: [id number] : [id number] Harris Jain NA   05/05/2022 1609 183 (H) 70 - 130 mg/dL Final     Comment:     Meter: [id number] : [id number] Tawnya Townsend NA   05/05/2022 1237 103 70 - 130 mg/dL Final     Comment:     Meter: [id number] : [id number] Tawnya Townsend NA         Assessment/Plan   Active Hospital Problems    Diagnosis  POA   • **Encephalopathy, toxic [G92.9]  Unknown   • Acute CVA (cerebrovascular accident) (HCC) [I63.9]  Unknown   • Intracranial hemorrhage (HCC) [I62.9]  Unknown   • Acute metabolic encephalopathy [G93.41]  Yes   • Leukocytosis [D72.829]  Yes   • Acute on chronic diastolic CHF (congestive heart failure) (HCC) [I50.33]  Yes   • CAD (coronary artery disease) [I25.10]  Yes   • ESRD (end stage renal disease) ([facility]) [N18.6]  Yes   • Hypertensive disorder [I10]  Yes   • Acute DM type 2 causing complication (HCC) [E11.8]  Yes      Resolved Hospital Problems   No resolved problems to display.       PLAN  This is a 56-year-old female with a history end-stage renal disease who presented to the hospital after missing some dialysis and unfortunately has had a rather complicated hospitalization with bacteremia and infection in her tunneled dialysis catheter as well as acute stroke during the hospitalization requiring tPA.  -Luckily she seems to be doing better from a mentation standpoint; she seems much more appropriate than she was a few days ago.  I think it is probably okay to proceed with a ANJEL when felt to be appropriate from cardiology's perspective  -Appreciate infectious diseases input regarding the antibiotics.  -Nephrology following for dialysis  -Blood sugars are stable here on present management  -Patient is a full code    Disposition  To be determined      Osmar King MD  [facility]  05/07/22  08:59 EDT

## 2022-05-07 NOTE — PLAN OF CARE
Goal Outcome Evaluation:  Plan of Care Reviewed With: patient        Pt to receive dialysis today. Vitals stables, no c/o pain.  Will continue to monitor

## 2022-05-07 NOTE — PROGRESS NOTES
DOS: 2022  NAME: Zaina Martinez   : 1965  PCP: Karson Oreilly MD    Chief Complaint   Patient presents with   • Altered Mental Status     PT WITH AMS, LAST SEEN NORMAL APPROX. 0800 TODAY; PT NOT TALKING, LEFT  WEAKNESS; PT IS ON DIALYSIS BUT HAS MISSED HER LAST APPOINTMENT; PT WEARING FACE MASK        Stroke    Subjective: Pt seen in follow up, however the problem is new to me.  Sitting in her bed on the phone as I entered the room.  Denies any new weakness, speech or visual disturbances, or headaches.  Does report she developed a burning sensation of her left leg that has been a gradual onset since what she thinks was last night.  She states she also has some weakness in that leg but the weakness is not new.  She worked with PT yesterday and she states her balance was off.  No family at bedside.    Objective:  Vital signs:      Vitals:    22 2358 22 0552 22 0606 22 0608   BP: 161/90  178/89 178/89   BP Location: Right arm  Right arm    Patient Position: Sitting  Lying    Pulse: 84  83 84   Resp: 18  18    Temp: 98.4 °F (36.9 °C)  98.5 °F (36.9 °C)    TempSrc: Oral  Oral    SpO2: 98%  99%    Weight:  48.2 kg (106 lb 4.2 oz)     Height:           Current Facility-Administered Medications:   •  acetaminophen (TYLENOL) suppository 650 mg, 650 mg, Rectal, Q4H PRN, Alex Randall MD, 650 mg at 22 0727  •  acetaminophen (TYLENOL) tablet 650 mg, 650 mg, Oral, Q4H PRN, Alex Randall MD, 650 mg at 22 1241  •  albumin human 25 % IV SOLN 12.5 g, 12.5 g, Intravenous, PRN, Alejo Lange MD  •  amLODIPine (NORVASC) tablet 10 mg, 10 mg, Oral, Q24H, Alex Randall MD, 10 mg at 22 0828  •  dextrose (D50W) (25 g/50 mL) IV injection 25 g, 25 g, Intravenous, Q15 Min PRN, Alex Randall MD, 25 g at 22 0701  •  dextrose (GLUTOSE) oral gel 15 g, 15 g, Oral, Q15 Min PRN, Alex Randall MD  •  glucagon (human recombinant) (GLUCAGEN DIAGNOSTIC) injection 1 mg, 1 mg,  Intramuscular, Q15 Min PRN, Alex Randall MD  •  heparin (porcine) injection 3,000 Units, 3,000 Units, Intracatheter, Once, Alex Randall MD  •  heparin (porcine) injection 3,800 Units, 3,800 Units, Intracatheter, PRN, Alex Randall MD, 3,800 Units at 05/05/22 1157  •  hydrALAZINE (APRESOLINE) injection 10 mg, 10 mg, Intravenous, Q4H PRN, Alex Randall MD, 10 mg at 05/02/22 2026  •  hydrALAZINE (APRESOLINE) tablet 50 mg, 50 mg, Oral, Q8H, Juan Prieto MD  •  insulin lispro (ADMELOG) injection 0-14 Units, 0-14 Units, Subcutaneous, Q6H, Alex Randall MD, 5 Units at 05/07/22 1217  •  levothyroxine (SYNTHROID, LEVOTHROID) tablet 125 mcg, 125 mcg, Oral, Q AM, Alex Randall MD, 125 mcg at 05/07/22 0609  •  melatonin tablet 3 mg, 3 mg, Oral, Nightly PRN, Alex Randall MD, 3 mg at 05/05/22 2124  •  nitroglycerin (NITROSTAT) SL tablet 0.4 mg, 0.4 mg, Sublingual, Q5 Min PRN, Alex Randall MD  •  ondansetron (ZOFRAN) tablet 4 mg, 4 mg, Oral, Q6H PRN **OR** ondansetron (ZOFRAN) injection 4 mg, 4 mg, Intravenous, Q6H PRN, Alex Randall MD, 4 mg at 04/28/22 1358  •  pantoprazole (PROTONIX) injection 40 mg, 40 mg, Intravenous, Q AM, Alex Randall MD, 40 mg at 05/07/22 0609  •  Pharmacy to dose vancomycin, , Does not apply, Continuous PRN, Rolando Canas,   •  rOPINIRole (REQUIP) tablet 0.75 mg, 0.75 mg, Oral, Nightly, Alex Randall MD, 0.75 mg at 05/06/22 2135  •  sertraline (ZOLOFT) tablet 100 mg, 100 mg, Oral, Daily, Alex Randall MD, 100 mg at 05/07/22 0829  •  [COMPLETED] Insert peripheral IV, , , Once **AND** sodium chloride 0.9 % flush 10 mL, 10 mL, Intravenous, PRN, Alex Randall MD  •  sodium chloride 0.9 % flush 10 mL, 10 mL, Intravenous, Q12H, Alex Randall MD, 10 mL at 05/07/22 0829  •  sodium chloride 0.9 % flush 10 mL, 10 mL, Intravenous, PRN, Alex Randall MD  •  tamsulosin (FLOMAX) 24 hr capsule 0.4 mg, 0.4 mg, Oral, Daily, Alex Randall MD, 0.4 mg at 05/07/22 0828  •  vancomycin 500 mg/100 mL  0.9% NS IVPB (mbp), 500 mg, Intravenous, Once, Osmar King MD  •  Vancomycin Pharmacy Intermittent Dosing, , Does not apply, Daily, Rolando Canas, DO    PRN meds  •  acetaminophen  •  acetaminophen  •  albumin human  •  dextrose  •  dextrose  •  glucagon (human recombinant)  •  heparin (porcine)  •  hydrALAZINE  •  melatonin  •  nitroglycerin  •  ondansetron **OR** ondansetron  •  Pharmacy to dose vancomycin  •  [COMPLETED] Insert peripheral IV **AND** sodium chloride  •  sodium chloride    No current facility-administered medications on file prior to encounter.     Current Outpatient Medications on File Prior to Encounter   Medication Sig   • acetaminophen (TYLENOL) 325 MG tablet Take 650 mg by mouth Every 4 (Four) Hours As Needed for Mild Pain .   • amLODIPine (NORVASC) 5 MG tablet Take 10 mg by mouth Daily.   • aspirin (aspirin) 81 MG EC tablet Take 81 mg by mouth Daily.   • folic acid (FOLVITE) 1 MG tablet Take 1 mg by mouth Daily.   • furosemide (LASIX) 40 MG tablet Take 1 tablet by mouth 2 (Two) Times a Day.   • hydrALAZINE (APRESOLINE) 100 MG tablet Take 1 tablet by mouth 3 (Three) Times a Day for 30 days.   • HYDROcodone-acetaminophen (NORCO) 5-325 MG per tablet Take 1 tablet by mouth Every 6 (Six) Hours As Needed for Moderate Pain .   • insulin glargine (LANTUS, SEMGLEE) 100 UNIT/ML injection Inject 10 Units under the skin into the appropriate area as directed Daily.   • insulin lispro (humaLOG) 100 UNIT/ML injection Inject 3 Units under the skin into the appropriate area as directed 3 (Three) Times a Day Before Meals.   • levothyroxine (SYNTHROID, LEVOTHROID) 125 MCG tablet Take 125 mcg by mouth Daily.   • metoprolol tartrate (LOPRESSOR) 25 MG tablet Take 25 mg by mouth 2 (Two) Times a Day.   • multivitamin with minerals (MULTIVITAMIN ADULT PO) Take 1 tablet by mouth Daily.   • OLANZapine (zyPREXA) 5 MG tablet Take 5 mg by mouth 2 (Two) Times a Day.   • pantoprazole (PROTONIX) 40 MG EC  tablet Take 40 mg by mouth Daily.   • rOPINIRole (REQUIP) 0.25 MG tablet Take 0.75 mg by mouth every night at bedtime.   • sertraline (ZOLOFT) 100 MG tablet Take 100 mg by mouth Daily.   • tamsulosin (FLOMAX) 0.4 MG capsule 24 hr capsule Take 1 capsule by mouth Daily.   • vitamin D3 125 MCG (5000 UT) capsule capsule Take 2,000 Units by mouth Daily.       General appearance: Young female, pleasant, NAD, alert and cooperative, well groomed  HEENT: Normocephalic, atraumatic, no masses or tenderness  Resp: Even and unlabored  Extremities: No obvious edema  Skin: warm, dry    Neurological:   MS: oriented x3, states she could not see the clock to state the time, recent/remote memory impaired, normal attention/concentration, language intact- delayed with response, no neglect  CN: visual acuity grossly abnormal, visual fields full, EOMI, facial sensation equal, no facial droop, hearing symmetric, palate elevates symmetrically, shoulder shrug equal, tongue midline  Motor: 5/5 in all ext. Except LLE 4/5- she was able to lift fine however easily pushed back down against resistance  Sensory: light touch sensation intact in all 4 ext.- tested with eyes open and closed- in isolation so did not use tuning fork to assess temperature sensation  Coordination: Normal finger to nose test  Gait and station: Deferred  Rapid alternating movements: normal finger to thumb tap    Laboratory results:  Lab Results   Component Value Date    .000 (H) 11/30/2021     Lab Results   Component Value Date    HGBA1C 5.80 (H) 04/28/2022     Lab Results   Component Value Date    GPEETJCQ72 448 12/05/2021     Lab Results   Component Value Date    CHOL 145 04/30/2022     Lab Results   Component Value Date    TRIG 119 04/30/2022     Lab Results   Component Value Date    HDL 44 04/30/2022     Lab Results   Component Value Date    LDL 80 04/30/2022     Lab Results   Component Value Date    WBC 8.50 05/07/2022    HGB 9.8 (L) 05/07/2022    HCT 30.3 (L)  05/07/2022    MCV 83.2 05/07/2022     05/07/2022     Lab Results   Component Value Date    GLUCOSE 74 05/07/2022    BUN 27 (H) 05/07/2022    CREATININE 3.40 (H) 05/07/2022    EGFRIFNONA 14 (L) 02/28/2022    EGFRIFAFRI  01/13/2022      Comment:      <15 Indicative of kidney failure.    BCR 7.9 05/07/2022    K 4.0 05/07/2022    CO2 24.0 05/07/2022    CALCIUM 7.9 (L) 05/07/2022    ALBUMIN 2.80 (L) 05/07/2022    AST 30 04/27/2022    ALT 14 04/27/2022     Lab Results   Component Value Date    PTT 29.4 04/29/2022     Lab Results   Component Value Date    INR 0.97 04/29/2022    INR 1.02 04/27/2022    INR 0.99 03/01/2022    PROTIME 12.8 04/29/2022    PROTIME 13.4 04/27/2022    PROTIME 13.0 03/01/2022     Brief Urine Lab Results  (Last result in the past 365 days)      Color   Clarity   Blood   Leuk Est   Nitrite   Protein   CREAT   Urine HCG        04/27/22 1712 Yellow   Clear   Small (1+)   Small (1+)   Negative   >=300 mg/dL (3+)               Pain Management Panel     Pain Management Panel Latest Ref Rng & Units 4/27/2022 2/23/2022    CREATININE UR mg/dL - -    AMPHETAMINES SCREEN, URINE Negative (arb'U) - -    BARBITURATES SCREEN Negative Negative Negative    BENZODIAZEPINE SCREEN, URINE Negative Positive(A) Positive(A)    COCAINE SCREEN, URINE Negative Negative Negative    METHADONE SCREEN, URINE Negative Negative Negative        Blood Culture   Date Value Ref Range Status   05/05/2022 No growth at 2 days  Preliminary   05/05/2022 No growth at 2 days  Preliminary     No results found for: BCIDPCR, CXREFLEX, CSFCX, CULTURETIS  No results found for: CULTURES, HSVCX, URCX  No results found for: EYECULTURE, GCCX, HSVCULTURE, LABHSV  No results found for: LEGIONELLA, MRSACX, MUMPSCX, MYCOPLASCX  No results found for: NOCARDIACX, STOOLCX  No results found for: THROATCX, UNSTIMCULT, URINECX, CULTURE, VZVCULTUR  No results found for: VIRALCULTU, WOUNDCX    Ethanol levels negative    Review and interpretation of  imaging:  CT Head Without Contrast    Result Date: 5/1/2022  Stable size to previously identified left frontal intraparenchymal hematoma.  Radiation dose reduction techniques were utilized, including automated exposure control and exposure modulation based on body size.  This report was finalized on 5/1/2022 5:31 AM by Dr. Cherri Rodriguez M.D.      CT Head Without Contrast    Result Date: 4/30/2022  Electronically signed by John Henderson M.D. on 04-30-22 at 1728    CT Head Without Contrast    Result Date: 4/30/2022  Interval increase in the size of the previously identified left frontal intraparenchymal hematoma.  FINDINGS were called to patient's nurse at 4:43 AM.  Radiation dose reduction techniques were utilized, including automated exposure control and exposure modulation based on body size.  This report was finalized on 4/30/2022 4:44 AM by Dr. Cherri Rodriguez M.D.      CT Head Without Contrast    Result Date: 4/29/2022   1. Hyperdense focus identified within the left frontal lobe, new when compared to prior exam, worrisome for an area of intraparenchymal hemorrhage, given history of thrombolytics. Close follow-up is recommended.  FINDINGS were relayed to Dr. Tovar at 11:00 PM.  Radiation dose reduction techniques were utilized, including automated exposure control and exposure modulation based on body size.  This report was finalized on 4/29/2022 11:04 PM by Dr. Cherri Rodriguez M.D.      MRI Brain Without Contrast    Result Date: 4/30/2022  Focal area of acute to subacute intracerebral hemorrhage within the white matter of the left frontal lobe near the vertex as described. The hemorrhage appears unchanged in overall size since the CT performed earlier this morning. Mild vasogenic edema surrounds the hemorrhage. There is a thin rind of confluent T2 hyperintensity surrounding both lateral ventricles that is unchanged since a preceding MRI studies, and is most likely sequela of small vessel chronic  ischemic phenomena.  This report was finalized on 4/30/2022 12:10 PM by Dr. Darien Barron M.D.      XR Chest Post CVA Port    Result Date: 5/3/2022  Status post exchange of right IJ line for a double-lumen catheter with its tip in the superior aspect of the right atrium and no definite pneumothorax is seen, although a skin fold overlies the right lung apex limiting evaluation. No definite active disease is seen in the chest. The results were communicated to Jolene, the nurse on the floor taking care of the patient, on 05/03/2022 at 10:30 AM.  This report was finalized on 5/3/2022 3:59 PM by Dr. Suhas Sanches M.D.      Results for orders placed during the hospital encounter of 04/27/22    Adult Transthoracic Echo Complete W/ Cont if Necessary Per Protocol (With Agitated Saline)    Interpretation Summary  · Calculated left ventricular EF = 66.8% Estimated left ventricular EF was in agreement with the calculated left ventricular EF. Left ventricular systolic function is normal.  · Left ventricular diastolic function is consistent with (grade II w/high LAP) pseudonormalization. Moderate to severely enlarged left atrial cavity noted.  · Moderate pulmonary hypertension is present.Calculated right ventricular systolic pressure from tricuspid regurgitation is 48.5 mmHg.  · Saline test results are negative for right to left atrial level shunt.  · Severe mitral annular calcification is present. Mild mitral valve regurgitation is present. No significant mitral valve stenosis is present.  · There is a small (<1cm) pericardial effusion adjacent to the left ventricle.      Impression/Assessment:  This is a 56-year-old female with past medical history of congestive heart failure, CAD on ASA 81 mg PTA, diabetes, thyroid disease, hyperlipidemia, hypertension, rheumatoid arthritis, depression, ESRD who presented to the hospital on 4/27/2022 with complaints of confusion and poor oral intake.  Reportedly the confusion had started  several days prior to her admission.  She had also reported missing 2 dialysis sessions most recently.  Our service was consulted due to her confusion and altered mental status.    She was found to have an infected dialysis line which was removed and sent for culture which did reveal MRSA therefore she was initiated on IV antibiotics.  On 4/29 she developed symptoms of acute right MCA syndrome.  She was taken for CT/CTA/CTP that revealed some diminished perfusion in the right MCA inferior division otherwise was negative for any retrievable LVO or hemorrhage.  She did receive IV TNKase based on a high NIH and subsequently developed a left frontal intracerebral hemorrhage.  The TNKase was reversed with cryo.  Follow-up CT head revealed slight increase in her hemorrhage.  Initial MRI was negative for restricted diffusion but did have some flair changes.  Repeat MRI brain revealed a subcortical left frontal intracerebral hemorrhage with mild vasogenic edema noted.  She did show signs of improvement however continued to be encephalopathic which was felt to be multifactorial from her hemorrhage and her time between dialysis sessions as well as her infection.  EEG was obtained due to concern for focal seizure however was normal.  We recommended holding off on any aspirin/anticoagulation as there is also concern for possible infective endocarditis.    She had a 2D echo that revealed an EF of 66.8%, no evidence of LV thrombus or mass, LA moderately to severely dilated, saline test negative, severe mitral annular calcification.  EKG on arrival with no evidence of A. fib or flutter.  ANJEL has been requested by ID to evaluate for endocarditis.  Her blood cultures have remained with no growth.      Of note, per review of her med rec she is taking Zoloft 100 mg daily, Requip 0.75 mg nightly, Zyprexa 5 mg twice daily, and Norco 5-325 mg every 6 hours.    Diagnosis:  Right MCA syndrome s/p IV TNK  Subcortical left frontal  intracerebral hemorrhage  Encephalopathy; suspect metabolic  ESRD on hemodialysis  Hemodialysis catheter infection  Type 2 diabetes  Cognitive impairment with memory loss  Polypharmacy    Plan:  She was alert and oriented x3 today.  Followed all commands.  Still had some delay with her speech.  She complains of a burning sensation of her  lower left leg and foot which was a gradual onset.  Feels she has some RLS symptoms. She has been restarted on her Requip. She does have a history of diabetes.  Denies any history of neuropathy.  Will CTM.  May need outpatient EMG/NCS.  Check TSH, her TSH level in November 2021 was 152.  Check B12 level  Continue to avoid antiplatelet and anticoagulant therapy for now  ANJEL planned for Monday per documentation  Repeat MRI brain with and without scheduled in 3 months.  I have also scheduled an appointment with Dr. Pitt in the outpatient setting around that time.  Normalize BP  AYAKA/SCDs  PT/OT/ST  Therapies as written. CCP for discharge planning. Call RRT for any acute neurological changes. We will follow up on her after her ANJEL on Monday.    Case discussed with patient, RN, and Dr. Bull, and he agrees with plan above.     RIKA Barrow

## 2022-05-07 NOTE — NURSING NOTE
HD WITHOUT INCIDENT OR C/O. TOLERATED WELL. REMOVED 1100 ML'S (IWG). NO MEDS ADMINISTERED. DRSG CURRENT, CDI WITH BIOPATCH. STABLE, NO C/O UPON COMPLETION OF TREATMENT.

## 2022-05-07 NOTE — PROGRESS NOTES
"[facility] Clinical Pharmacy Services: Vancomycin Monitoring Note    Zaina Martinez is a 56 y.o. female who is on day 9 of pharmacy to dose vancomycin for SSTI per Dr. Soriano's request. Dr. Canas following for sepsis and HD catheter-related infection w/MRSA.    Previous Vancomycin Dose: 500 mg IV once on 5/5 at 1706    Results from last 7 days   Lab Units 05/07/22  0423 05/04/22  0320 05/03/22  0340   VANCOMYCIN RM mcg/mL 15.90 21.70 16.10     Vitals/Labs  Ht: 157.5 cm (62.01\"); Wt: 48.2 kg (106 lb 4.2 oz)   Temp Readings from Last 1 Encounters:   05/07/22 98.5 °F (36.9 °C) (Oral)     Estimated Creatinine Clearance: 14.1 mL/min (A) (by C-G formula based on SCr of 3.4 mg/dL (H)).   Dialysis TTS    Results from last 7 days   Lab Units 05/07/22  0423 05/06/22  0622 05/05/22  0557   CREATININE mg/dL 3.40* 2.66* 3.85*   WBC 10*3/mm3 8.50 7.95 11.38*     Assessment/Plan    1. Vancomycin ordered for post HD today. Pre-HD level this am within range   2. Next Level Date and Time: TBD, Pre-HD levels have been consistently in range   3. We will continue to monitor patient changes and renal function     Thank you for involving pharmacy in this patient's care. Please contact pharmacy with any questions or concerns.       Inocencio Crawford [facility]  Clinical Pharmacist    "

## 2022-05-07 NOTE — PROGRESS NOTES
[facility] Progress Note      Patient Name: Zaina Martinez  : 1965  MRN: [id number]  Primary Care Physician:  Karson Oreilly MD  Date of admission: 2022    Subjective     Interval History:   Follow up ESRD    Patient resting comfortably.  Feeling very tired.  Denied any chest pain, shortness of breath, nausea or vomiting    Objective     Vitals:   Temp:  [98.4 °F (36.9 °C)-98.7 °F (37.1 °C)] 98.5 °F (36.9 °C)  Heart Rate:  [83-84] 84  Resp:  [18] 18  BP: (161-178)/(83-90) 178/89    Intake/Output Summary (Last 24 hours) at 2022 0958  Last data filed at 2022 0505  Gross per 24 hour   Intake 240 ml   Output 300 ml   Net -60 ml       Physical Exam:   General: Awake, chronically ill, blunted but still oriented  Skin:dry. No rash  HEENT: AT/NC, oral mucosa dry  Neck:  RIJ TDC  Lungs:  clear to auscultation. Unlabored.   Heart: RRR no s3 or rub  Abdomen: + bs, soft, nontender, ND   Extremities: no edema.    Neuro: Calm. Speech fluent.   Moves all 4 ext.     Scheduled Meds:     amLODIPine, 10 mg, Oral, Q24H  heparin (porcine), 3,000 Units, Intracatheter, Once  hydrALAZINE, 25 mg, Oral, Q8H  insulin lispro, 0-14 Units, Subcutaneous, Q6H  levothyroxine, 125 mcg, Oral, Q AM  pantoprazole, 40 mg, Intravenous, Q AM  rOPINIRole, 0.75 mg, Oral, Nightly  sertraline, 100 mg, Oral, Daily  sodium chloride, 10 mL, Intravenous, Q12H  tamsulosin, 0.4 mg, Oral, Daily  vancomycin, 500 mg, Intravenous, Once  Vancomycin Pharmacy Intermittent/Pulse Dosing, , Does not apply, Daily      IV Meds:   Pharmacy to dose vancomycin,         Results Reviewed:   I have personally reviewed the results from the time of this admission to 2022 09:58 EDT     Results from last 7 days   Lab Units 22  0423 22  0622 22  0557   SODIUM mmol/L 134* 135* 131*   POTASSIUM mmol/L 4.0 3.9 3.8   CHLORIDE mmol/L 98 99 95*   CO2 mmol/L 24.0 23.7 25.0   BUN mg/dL 27* 17 32*   CREATININE mg/dL 3.40*  2.66* 3.85*   CALCIUM mg/dL 7.9* 8.0* 7.8*   GLUCOSE mg/dL 74 130* 166*       Estimated Creatinine Clearance: 14.1 mL/min (A) (by C-G formula based on SCr of 3.4 mg/dL (H)).    Results from last 7 days   Lab Units 05/07/22 0423 05/06/22 0622 05/05/22 0557 05/04/22 0320 05/03/22  0340   MAGNESIUM mg/dL  --   --   --   --  1.8   PHOSPHORUS mg/dL 2.5 2.2* 3.3   < > 3.7    < > = values in this interval not displayed.             Results from last 7 days   Lab Units 05/07/22 0423 05/06/22 0622 05/05/22 0557 05/04/22 0320 05/03/22  0340   WBC 10*3/mm3 8.50 7.95 11.38* 17.72* 16.34*   HEMOGLOBIN g/dL 9.8* 9.5* 7.9* 9.6* 10.0*   PLATELETS 10*3/mm3 366 313 245 214 193             Assessment / Plan     ASSESSMENT:  1.  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   Electrolytes, volume status acceptable  2.  Intracerebral bleed and altered mental status.  MS improving.   3.  Infected TDC, s/p removal 5/1. Staph aureus . On vanc, and WBC better.    4.  DM2    5.  Coronary artery disease  6.  Acute on chronic diastolic dysfunction . 3 kg off with HD this am.  BP better.   7.  Anemia of CKD, on long-acting ANDRAE as an outpatient.  8.  HTN, not controlled    PLAN:  1.  Patient to get hemodialysis today then resume per MWF schedule  2.  Increase hydralazine dose for blood pressure control    Juan Prieto MD  05/07/22  09:58 EDT    [facility]  [phone]

## 2022-05-08 LAB
ALBUMIN SERPL-MCNC: 3.1 G/DL (ref 3.5–5.2)
ANION GAP SERPL CALCULATED.3IONS-SCNC: 10.7 MMOL/L (ref 5–15)
BUN SERPL-MCNC: 12 MG/DL (ref 6–20)
BUN/CREAT SERPL: 5.9 (ref 7–25)
CALCIUM SPEC-SCNC: 8.1 MG/DL (ref 8.6–10.5)
CHLORIDE SERPL-SCNC: 99 MMOL/L (ref 98–107)
CO2 SERPL-SCNC: 26.3 MMOL/L (ref 22–29)
CREAT SERPL-MCNC: 2.02 MG/DL (ref 0.57–1)
DEPRECATED RDW RBC AUTO: 53.6 FL (ref 37–54)
EGFRCR SERPLBLD CKD-EPI 2021: 28.5 ML/MIN/1.73
ERYTHROCYTE [DISTWIDTH] IN BLOOD BY AUTOMATED COUNT: 17.2 % (ref 12.3–15.4)
GLUCOSE BLDC GLUCOMTR-MCNC: 147 MG/DL (ref 70–130)
GLUCOSE BLDC GLUCOMTR-MCNC: 215 MG/DL (ref 70–130)
GLUCOSE BLDC GLUCOMTR-MCNC: 262 MG/DL (ref 70–130)
GLUCOSE BLDC GLUCOMTR-MCNC: 356 MG/DL (ref 70–130)
GLUCOSE SERPL-MCNC: 65 MG/DL (ref 65–99)
HCT VFR BLD AUTO: 31.3 % (ref 34–46.6)
HGB BLD-MCNC: 10.1 G/DL (ref 12–15.9)
MCH RBC QN AUTO: 27.1 PG (ref 26.6–33)
MCHC RBC AUTO-ENTMCNC: 32.3 G/DL (ref 31.5–35.7)
MCV RBC AUTO: 83.9 FL (ref 79–97)
PHOSPHATE SERPL-MCNC: 1.4 MG/DL (ref 2.5–4.5)
PLATELET # BLD AUTO: 335 10*3/MM3 (ref 140–450)
PMV BLD AUTO: 9.6 FL (ref 6–12)
POTASSIUM SERPL-SCNC: 3.9 MMOL/L (ref 3.5–5.2)
RBC # BLD AUTO: 3.73 10*6/MM3 (ref 3.77–5.28)
SODIUM SERPL-SCNC: 136 MMOL/L (ref 136–145)
VIT B12 BLD-MCNC: 735 PG/ML (ref 211–946)
WBC NRBC COR # BLD: 8.23 10*3/MM3 (ref 3.4–10.8)

## 2022-05-08 PROCEDURE — 63710000001 INSULIN LISPRO (HUMAN) PER 5 UNITS: Performed by: INTERNAL MEDICINE

## 2022-05-08 PROCEDURE — 82607 VITAMIN B-12: CPT | Performed by: NURSE PRACTITIONER

## 2022-05-08 PROCEDURE — 85027 COMPLETE CBC AUTOMATED: CPT | Performed by: INTERNAL MEDICINE

## 2022-05-08 PROCEDURE — 80069 RENAL FUNCTION PANEL: CPT | Performed by: INTERNAL MEDICINE

## 2022-05-08 PROCEDURE — 82962 GLUCOSE BLOOD TEST: CPT

## 2022-05-08 RX ORDER — CARVEDILOL 3.12 MG/1
3.12 TABLET ORAL 2 TIMES DAILY WITH MEALS
Status: DISCONTINUED | OUTPATIENT
Start: 2022-05-08 | End: 2022-05-13 | Stop reason: HOSPADM

## 2022-05-08 RX ORDER — HYDRALAZINE HYDROCHLORIDE 50 MG/1
100 TABLET, FILM COATED ORAL EVERY 8 HOURS SCHEDULED
Status: DISCONTINUED | OUTPATIENT
Start: 2022-05-08 | End: 2022-05-13 | Stop reason: HOSPADM

## 2022-05-08 RX ADMIN — TRAZODONE HYDROCHLORIDE 50 MG: 50 TABLET ORAL at 00:54

## 2022-05-08 RX ADMIN — INSULIN LISPRO 5 UNITS: 100 INJECTION, SOLUTION INTRAVENOUS; SUBCUTANEOUS at 17:00

## 2022-05-08 RX ADMIN — ACETAMINOPHEN 650 MG: 325 TABLET, FILM COATED ORAL at 00:54

## 2022-05-08 RX ADMIN — PANTOPRAZOLE SODIUM 40 MG: 40 INJECTION, POWDER, FOR SOLUTION INTRAVENOUS at 06:18

## 2022-05-08 RX ADMIN — CARVEDILOL 3.12 MG: 3.12 TABLET, FILM COATED ORAL at 11:40

## 2022-05-08 RX ADMIN — INSULIN LISPRO 10 UNITS: 100 INJECTION, SOLUTION INTRAVENOUS; SUBCUTANEOUS at 00:17

## 2022-05-08 RX ADMIN — INSULIN LISPRO 8 UNITS: 100 INJECTION, SOLUTION INTRAVENOUS; SUBCUTANEOUS at 11:40

## 2022-05-08 RX ADMIN — HYDRALAZINE HYDROCHLORIDE 100 MG: 50 TABLET ORAL at 21:15

## 2022-05-08 RX ADMIN — Medication 3 MG: at 21:25

## 2022-05-08 RX ADMIN — SODIUM PHOSPHATE, MONOBASIC, MONOHYDRATE 10 MMOL: 276; 142 INJECTION, SOLUTION INTRAVENOUS at 11:41

## 2022-05-08 RX ADMIN — TAMSULOSIN HYDROCHLORIDE 0.4 MG: 0.4 CAPSULE ORAL at 08:14

## 2022-05-08 RX ADMIN — Medication 10 ML: at 08:14

## 2022-05-08 RX ADMIN — HYDRALAZINE HYDROCHLORIDE 50 MG: 50 TABLET, FILM COATED ORAL at 06:19

## 2022-05-08 RX ADMIN — AMLODIPINE BESYLATE 10 MG: 10 TABLET ORAL at 08:14

## 2022-05-08 RX ADMIN — Medication 10 ML: at 21:19

## 2022-05-08 RX ADMIN — HYDRALAZINE HYDROCHLORIDE 100 MG: 50 TABLET ORAL at 14:52

## 2022-05-08 RX ADMIN — CARVEDILOL 3.12 MG: 3.12 TABLET, FILM COATED ORAL at 21:19

## 2022-05-08 RX ADMIN — SERTRALINE 100 MG: 100 TABLET, FILM COATED ORAL at 08:14

## 2022-05-08 RX ADMIN — LEVOTHYROXINE SODIUM 125 MCG: 0.12 TABLET ORAL at 06:18

## 2022-05-08 RX ADMIN — ROPINIROLE HYDROCHLORIDE 0.75 MG: 0.5 TABLET, FILM COATED ORAL at 21:15

## 2022-05-08 NOTE — PROGRESS NOTES
[facility] to Hospital Care    [phone]   LOS: 11 days     Name: Zaina Martinez  Age/Sex: 56 y.o. female  :  1965        PCP: Karson Oreilly MD  Chief Complaint   Patient presents with   • Altered Mental Status     PT WITH AMS, LAST SEEN NORMAL APPROX. 0800 TODAY; PT NOT TALKING, LEFT  WEAKNESS; PT IS ON DIALYSIS BUT HAS MISSED HER LAST APPOINTMENT; PT WEARING FACE MASK      Subjective   She feels better today.  Denies new issues or complaints currently.  Rested well overnight.  Remains alert and conversant  General: No Fever or Chills, Cardiac: No Chest Pain or Palpitations, Resp: No Cough or SOA, GI: No Nausea, Vomiting, or Diarrhea and Other: No bleeding    amLODIPine, 10 mg, Oral, Q24H  carvedilol, 3.125 mg, Oral, BID With Meals  heparin (porcine), 3,000 Units, Intracatheter, Once  hydrALAZINE, 100 mg, Oral, Q8H  insulin lispro, 0-14 Units, Subcutaneous, Q6H  levothyroxine, 125 mcg, Oral, Q AM  pantoprazole, 40 mg, Intravenous, Q AM  rOPINIRole, 0.75 mg, Oral, Nightly  sertraline, 100 mg, Oral, Daily  sodium chloride, 10 mL, Intravenous, Q12H  sodium phosphate IVPB, 10 mmol, Intravenous, Once  tamsulosin, 0.4 mg, Oral, Daily  Vancomycin Pharmacy Intermittent/Pulse Dosing, , Does not apply, Daily      Pharmacy to dose vancomycin,         Objective   Vital Signs  Temp:  [97.6 °F (36.4 °C)-98.7 °F (37.1 °C)] 97.6 °F (36.4 °C)  Heart Rate:  [86-94] 93  Resp:  [16-18] 18  BP: (148-198)/(62-99) 198/98  Body mass index is 22.05 kg/m².    Intake/Output Summary (Last 24 hours) at 2022 1217  Last data filed at 2022 0900  Gross per 24 hour   Intake 580 ml   Output 1100 ml   Net -520 ml       Physical Exam  Vitals and nursing note reviewed.   Constitutional:       Comments: Chronically ill-appearing   Cardiovascular:      Rate and Rhythm: Normal rate and regular rhythm.   Pulmonary:      Effort: No respiratory distress.      Breath sounds: Normal breath sounds.   Abdominal:      General: Bowel  sounds are normal. There is no distension.      Palpations: Abdomen is soft.   Skin:     General: Skin is warm and dry.   Neurological:      General: No focal deficit present.      Mental Status: She is alert.           Results Review:       I reviewed the patient's new clinical results.  Results from last 7 days   Lab Units 05/08/22  0510 05/07/22  0423 05/06/22  0622 05/05/22  0557 05/04/22  0320 05/03/22  0340 05/02/22  0329   WBC 10*3/mm3 8.23 8.50 7.95 11.38* 17.72* 16.34* 10.55   HEMOGLOBIN g/dL 10.1* 9.8* 9.5* 7.9* 9.6* 10.0* 10.8*   PLATELETS 10*3/mm3 335 366 313 245 214 193 203     Results from last 7 days   Lab Units 05/08/22  0510 05/07/22  0423 05/06/22  0622 05/05/22  0557 05/04/22 0320 05/03/22 0340 05/02/22  0329   SODIUM mmol/L 136 134* 135* 131* 132* 129* 130*   POTASSIUM mmol/L 3.9 4.0 3.9 3.8 3.9 4.6 3.9   CHLORIDE mmol/L 99 98 99 95* 99 96* 95*   CO2 mmol/L 26.3 24.0 23.7 25.0 19.0* 21.0* 22.3   BUN mg/dL 12 27* 17 32* 19 39* 26*   CREATININE mg/dL 2.02* 3.40* 2.66* 3.85* 2.93* 5.04* 4.23*   CALCIUM mg/dL 8.1* 7.9* 8.0* 7.8* 7.9* 7.9* 7.9*   MAGNESIUM mg/dL  --   --   --   --   --  1.8  --    PHOSPHORUS mg/dL 1.4* 2.5 2.2* 3.3 2.6 3.7 3.2   Estimated Creatinine Clearance: 26.9 mL/min (A) (by C-G formula based on SCr of 2.02 mg/dL (H)).  Lab Results   Component Value Date    HGBA1C 5.80 (H) 04/28/2022    HGBA1C 5.80 (H) 02/24/2022    HGBA1C 6.90 (H) 01/10/2022     Glucose   Date/Time Value Ref Range Status   05/08/2022 1107 262 (H) 70 - 130 mg/dL Final     Comment:     Meter: [id number] : [id number] Dilcia Parkinson QUENTIN   05/08/2022 0613 147 (H) 70 - 130 mg/dL Final     Comment:     RN Notified R and V Meter: [id number] : [id number] Sree Singh    05/07/2022 2108 310 (H) 70 - 130 mg/dL Final     Comment:     RN Notified R and V Meter: [id number] : [id number] Lizet Burgess NA   05/07/2022 1055 255 (H) 70 - 130 mg/dL Final     Comment:     Meter: [id number] : [id number] Gabriela  Dana NA   05/07/2022 0623 78 70 - 130 mg/dL Final     Comment:     Meter: [id number] : [id number] Harris Jain NA   05/07/2022 0008 220 (H) 70 - 130 mg/dL Final     Comment:     Meter: [id number] : [id number] Harris Blounte NA   05/06/2022 1739 168 (H) 70 - 130 mg/dL Final     Comment:     Meter: [id number] : [id number] Anthony Nick NA   05/06/2022 1220 247 (H) 70 - 130 mg/dL Final     Comment:     Meter: [id number] : [id number] Anthony Nick NA         Assessment/Plan   Active Hospital Problems    Diagnosis  POA   • **Encephalopathy, toxic [G92.9]  Unknown   • Acute CVA (cerebrovascular accident) (HCC) [I63.9]  Unknown   • Intracranial hemorrhage (HCC) [I62.9]  Unknown   • Acute metabolic encephalopathy [G93.41]  Yes   • Leukocytosis [D72.829]  Yes   • Acute on chronic diastolic CHF (congestive heart failure) (HCC) [I50.33]  Yes   • CAD (coronary artery disease) [I25.10]  Yes   • ESRD (end stage renal disease) (HCC) [N18.6]  Yes   • Hypertensive disorder [I10]  Yes   • Acute DM type 2 causing complication (HCC) [E11.8]  Yes      Resolved Hospital Problems   No resolved problems to display.       PLAN  This is a 56-year-old female with a history end-stage renal disease who presented to the hospital after missing some dialysis and unfortunately has had a rather complicated hospitalization with bacteremia and infection in her tunneled dialysis catheter as well as acute stroke during the hospitalization requiring tPA.  -Luckily she seems to be doing better from a mentation standpoint; she seems much more appropriate than she was a few days ago.  I think it is probably okay to proceed with a ANJEL when felt to be appropriate from cardiology's perspective, discussed with Dr. Montoya and on the schedule for tomorrow  -Appreciate infectious diseases input regarding the antibiotics.  -Nephrology following for dialysis  -Blood sugars are stable here on present management  -Blood pressures been  running high today.  Medication adjustments noted  -Patient is a full code    Disposition  To be determined      Osmar King MD  [facility]  05/08/22  12:17 EDT

## 2022-05-08 NOTE — PROGRESS NOTES
[facility] Progress Note      Patient Name: Zaina Martinez  : 1965  MRN: [id number]  Primary Care Physician:  Karson Oreilly MD  Date of admission: 2022    Subjective     Interval History:   Follow up ESRD    Patient feeling depressed this morning.  She has some headache but no chest pain, shortness of breath, nausea or vomiting    Objective     Vitals:   Temp:  [98.2 °F (36.8 °C)-98.7 °F (37.1 °C)] 98.7 °F (37.1 °C)  Heart Rate:  [86-94] 86  Resp:  [16-18] 18  BP: (148-188)/(62-99) 188/99    Intake/Output Summary (Last 24 hours) at 2022 0837  Last data filed at 2022 2100  Gross per 24 hour   Intake 340 ml   Output 1100 ml   Net -760 ml       Physical Exam:   General: Awake, chronically ill, blunted but still oriented  Skin:dry. No rash  HEENT: AT/NC, oral mucosa dry  Neck:  RIJ TDC  Lungs:  clear to auscultation. Unlabored.   Heart: RRR no s3 or rub  Abdomen: + bs, soft, nontender, ND   Extremities: no edema.    Neuro: Calm. Speech fluent.   Moves all 4 ext.     Scheduled Meds:     amLODIPine, 10 mg, Oral, Q24H  heparin (porcine), 3,000 Units, Intracatheter, Once  hydrALAZINE, 50 mg, Oral, Q8H  insulin lispro, 0-14 Units, Subcutaneous, Q6H  levothyroxine, 125 mcg, Oral, Q AM  pantoprazole, 40 mg, Intravenous, Q AM  rOPINIRole, 0.75 mg, Oral, Nightly  sertraline, 100 mg, Oral, Daily  sodium chloride, 10 mL, Intravenous, Q12H  tamsulosin, 0.4 mg, Oral, Daily  Vancomycin Pharmacy Intermittent/Pulse Dosing, , Does not apply, Daily      IV Meds:   Pharmacy to dose vancomycin,         Results Reviewed:   I have personally reviewed the results from the time of this admission to 2022 08:37 EDT     Results from last 7 days   Lab Units 22  0510 22  0423 22  0622   SODIUM mmol/L 136 134* 135*   POTASSIUM mmol/L 3.9 4.0 3.9   CHLORIDE mmol/L 99 98 99   CO2 mmol/L 26.3 24.0 23.7   BUN mg/dL 12 27* 17   CREATININE mg/dL 2.02* 3.40* 2.66*   CALCIUM mg/dL  8.1* 7.9* 8.0*   GLUCOSE mg/dL 65 74 130*       Estimated Creatinine Clearance: 26.9 mL/min (A) (by C-G formula based on SCr of 2.02 mg/dL (H)).    Results from last 7 days   Lab Units 05/08/22  0510 05/07/22  0423 05/06/22  0622 05/04/22  0320 05/03/22  0340   MAGNESIUM mg/dL  --   --   --   --  1.8   PHOSPHORUS mg/dL 1.4* 2.5 2.2*   < > 3.7    < > = values in this interval not displayed.             Results from last 7 days   Lab Units 05/08/22  0510 05/07/22  0423 05/06/22  0622 05/05/22  0557 05/04/22  0320   WBC 10*3/mm3 8.23 8.50 7.95 11.38* 17.72*   HEMOGLOBIN g/dL 10.1* 9.8* 9.5* 7.9* 9.6*   PLATELETS 10*3/mm3 335 366 313 245 214             Assessment / Plan     ASSESSMENT:  1.  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   Electrolytes, volume status acceptable  2.  Intracerebral bleed and altered mental status.  MS improving.   3.  Infected TDC, s/p removal 5/1. Staph aureus . On vanc, and WBC better.    4.  DM2    5.  Coronary artery disease  6.  Acute on chronic diastolic dysfunction .  Volume status improving  7.  Anemia of CKD, on long-acting ANDRAE as an outpatient.  8.  HTN, not controlled    PLAN:  1.  Hemodialysis tomorrow and continue per [facility] schedule  2.  Increase hydralazine dose and add low-dose beta-blockers for blood pressure control  3.  Monitor electrolytes and correct as needed    Juan Prieto MD  05/08/22  08:37 EDT    [facility]  [phone]

## 2022-05-08 NOTE — PLAN OF CARE
Goal Outcome Evaluation:  Plan of Care Reviewed With: patient        Progress: no change  Outcome Evaluation: Pt requested for valium. Called and talked to NILAM MELÉNDEZ with orders made and noted. Trazadone once. Pt c/o hailey leg pain prn Tylenol given with relief noted. Will continue to monitor.

## 2022-05-08 NOTE — PROGRESS NOTES
Spoke with Dr. King.  Patient now alert with good mental status.  Will set up ANJEL tomorrow.      German Montoya MD

## 2022-05-09 ENCOUNTER — APPOINTMENT (OUTPATIENT)
Dept: CT IMAGING | Facility: HOSPITAL | Age: 57
End: 2022-05-09

## 2022-05-09 DIAGNOSIS — I62.9 INTRACRANIAL HEMORRHAGE: Primary | ICD-10-CM

## 2022-05-09 DIAGNOSIS — I63.9 ACUTE CVA (CEREBROVASCULAR ACCIDENT): ICD-10-CM

## 2022-05-09 LAB
ALBUMIN SERPL-MCNC: 2.8 G/DL (ref 3.5–5.2)
ANION GAP SERPL CALCULATED.3IONS-SCNC: 11.5 MMOL/L (ref 5–15)
BUN SERPL-MCNC: 27 MG/DL (ref 6–20)
BUN/CREAT SERPL: 7.9 (ref 7–25)
C DIFF TOX GENS STL QL NAA+PROBE: NEGATIVE
CALCIUM SPEC-SCNC: 7.6 MG/DL (ref 8.6–10.5)
CHLORIDE SERPL-SCNC: 97 MMOL/L (ref 98–107)
CO2 SERPL-SCNC: 24.5 MMOL/L (ref 22–29)
CREAT SERPL-MCNC: 3.4 MG/DL (ref 0.57–1)
DEPRECATED RDW RBC AUTO: 54 FL (ref 37–54)
EGFRCR SERPLBLD CKD-EPI 2021: 15.3 ML/MIN/1.73
ERYTHROCYTE [DISTWIDTH] IN BLOOD BY AUTOMATED COUNT: 17.2 % (ref 12.3–15.4)
GLUCOSE BLDC GLUCOMTR-MCNC: 113 MG/DL (ref 70–130)
GLUCOSE BLDC GLUCOMTR-MCNC: 131 MG/DL (ref 70–130)
GLUCOSE BLDC GLUCOMTR-MCNC: 242 MG/DL (ref 70–130)
GLUCOSE BLDC GLUCOMTR-MCNC: 246 MG/DL (ref 70–130)
GLUCOSE SERPL-MCNC: 233 MG/DL (ref 65–99)
HCT VFR BLD AUTO: 29.8 % (ref 34–46.6)
HGB BLD-MCNC: 9.4 G/DL (ref 12–15.9)
MCH RBC QN AUTO: 27 PG (ref 26.6–33)
MCHC RBC AUTO-ENTMCNC: 31.5 G/DL (ref 31.5–35.7)
MCV RBC AUTO: 85.6 FL (ref 79–97)
PHOSPHATE SERPL-MCNC: 3 MG/DL (ref 2.5–4.5)
PLATELET # BLD AUTO: 282 10*3/MM3 (ref 140–450)
PMV BLD AUTO: 9.5 FL (ref 6–12)
POTASSIUM SERPL-SCNC: 4.3 MMOL/L (ref 3.5–5.2)
RBC # BLD AUTO: 3.48 10*6/MM3 (ref 3.77–5.28)
SARS-COV-2 ORF1AB RESP QL NAA+PROBE: NOT DETECTED
SODIUM SERPL-SCNC: 133 MMOL/L (ref 136–145)
VANCOMYCIN SERPL-MCNC: 14.1 MCG/ML (ref 5–40)
WBC NRBC COR # BLD: 8.41 10*3/MM3 (ref 3.4–10.8)

## 2022-05-09 PROCEDURE — 63710000001 INSULIN LISPRO (HUMAN) PER 5 UNITS: Performed by: INTERNAL MEDICINE

## 2022-05-09 PROCEDURE — 97110 THERAPEUTIC EXERCISES: CPT

## 2022-05-09 PROCEDURE — 85027 COMPLETE CBC AUTOMATED: CPT | Performed by: INTERNAL MEDICINE

## 2022-05-09 PROCEDURE — 25010000002 DIPHENHYDRAMINE PER 50 MG: Performed by: STUDENT IN AN ORGANIZED HEALTH CARE EDUCATION/TRAINING PROGRAM

## 2022-05-09 PROCEDURE — 25010000002 VANCOMYCIN PER 500 MG: Performed by: STUDENT IN AN ORGANIZED HEALTH CARE EDUCATION/TRAINING PROGRAM

## 2022-05-09 PROCEDURE — 25010000002 HEPARIN (PORCINE) PER 1000 UNITS: Performed by: INTERNAL MEDICINE

## 2022-05-09 PROCEDURE — 80202 ASSAY OF VANCOMYCIN: CPT | Performed by: HOSPITALIST

## 2022-05-09 PROCEDURE — 87493 C DIFF AMPLIFIED PROBE: CPT | Performed by: NURSE PRACTITIONER

## 2022-05-09 PROCEDURE — 5A1D70Z PERFORMANCE OF URINARY FILTRATION, INTERMITTENT, LESS THAN 6 HOURS PER DAY: ICD-10-PCS | Performed by: STUDENT IN AN ORGANIZED HEALTH CARE EDUCATION/TRAINING PROGRAM

## 2022-05-09 PROCEDURE — 82962 GLUCOSE BLOOD TEST: CPT

## 2022-05-09 PROCEDURE — 80069 RENAL FUNCTION PANEL: CPT | Performed by: INTERNAL MEDICINE

## 2022-05-09 PROCEDURE — 99232 SBSQ HOSP IP/OBS MODERATE 35: CPT | Performed by: INTERNAL MEDICINE

## 2022-05-09 PROCEDURE — 99233 SBSQ HOSP IP/OBS HIGH 50: CPT | Performed by: NURSE PRACTITIONER

## 2022-05-09 PROCEDURE — 25010000002 HYDRALAZINE PER 20 MG: Performed by: INTERNAL MEDICINE

## 2022-05-09 PROCEDURE — 70450 CT HEAD/BRAIN W/O DYE: CPT

## 2022-05-09 PROCEDURE — 99232 SBSQ HOSP IP/OBS MODERATE 35: CPT | Performed by: STUDENT IN AN ORGANIZED HEALTH CARE EDUCATION/TRAINING PROGRAM

## 2022-05-09 PROCEDURE — 25010000002 MAGNESIUM SULFATE 2 GM/50ML SOLUTION: Performed by: STUDENT IN AN ORGANIZED HEALTH CARE EDUCATION/TRAINING PROGRAM

## 2022-05-09 PROCEDURE — U0004 COV-19 TEST NON-CDC HGH THRU: HCPCS | Performed by: INTERNAL MEDICINE

## 2022-05-09 RX ORDER — MAGNESIUM SULFATE HEPTAHYDRATE 40 MG/ML
2 INJECTION, SOLUTION INTRAVENOUS ONCE
Status: COMPLETED | OUTPATIENT
Start: 2022-05-09 | End: 2022-05-09

## 2022-05-09 RX ORDER — DIPHENHYDRAMINE HYDROCHLORIDE 50 MG/ML
25 INJECTION INTRAMUSCULAR; INTRAVENOUS ONCE
Status: COMPLETED | OUTPATIENT
Start: 2022-05-09 | End: 2022-05-09

## 2022-05-09 RX ORDER — DIPHENOXYLATE HYDROCHLORIDE AND ATROPINE SULFATE 2.5; .025 MG/1; MG/1
1 TABLET ORAL ONCE
Status: COMPLETED | OUTPATIENT
Start: 2022-05-10 | End: 2022-05-10

## 2022-05-09 RX ADMIN — CARVEDILOL 3.12 MG: 3.12 TABLET, FILM COATED ORAL at 08:13

## 2022-05-09 RX ADMIN — ACETAMINOPHEN 650 MG: 325 TABLET, FILM COATED ORAL at 02:37

## 2022-05-09 RX ADMIN — HYDRALAZINE HYDROCHLORIDE 100 MG: 50 TABLET ORAL at 05:10

## 2022-05-09 RX ADMIN — INSULIN LISPRO 12 UNITS: 100 INJECTION, SOLUTION INTRAVENOUS; SUBCUTANEOUS at 00:08

## 2022-05-09 RX ADMIN — TAMSULOSIN HYDROCHLORIDE 0.4 MG: 0.4 CAPSULE ORAL at 08:13

## 2022-05-09 RX ADMIN — AMLODIPINE BESYLATE 10 MG: 10 TABLET ORAL at 08:13

## 2022-05-09 RX ADMIN — HEPARIN SODIUM 3800 UNITS: 1000 INJECTION INTRAVENOUS; SUBCUTANEOUS at 15:43

## 2022-05-09 RX ADMIN — ROPINIROLE HYDROCHLORIDE 0.75 MG: 0.5 TABLET, FILM COATED ORAL at 20:37

## 2022-05-09 RX ADMIN — PANTOPRAZOLE SODIUM 40 MG: 40 INJECTION, POWDER, FOR SOLUTION INTRAVENOUS at 05:10

## 2022-05-09 RX ADMIN — HYDRALAZINE HYDROCHLORIDE 10 MG: 20 INJECTION INTRAMUSCULAR; INTRAVENOUS at 00:08

## 2022-05-09 RX ADMIN — MAGNESIUM SULFATE HEPTAHYDRATE 2 G: 2 INJECTION, SOLUTION INTRAVENOUS at 10:21

## 2022-05-09 RX ADMIN — Medication 10 ML: at 22:08

## 2022-05-09 RX ADMIN — CARVEDILOL 3.12 MG: 3.12 TABLET, FILM COATED ORAL at 19:00

## 2022-05-09 RX ADMIN — SODIUM CHLORIDE 1000 MG: 9 INJECTION, SOLUTION INTRAVENOUS at 19:00

## 2022-05-09 RX ADMIN — ACETAMINOPHEN 650 MG: 325 TABLET, FILM COATED ORAL at 06:56

## 2022-05-09 RX ADMIN — VANCOMYCIN HYDROCHLORIDE 500 MG: 500 INJECTION, POWDER, LYOPHILIZED, FOR SOLUTION INTRAVENOUS at 20:37

## 2022-05-09 RX ADMIN — HYDRALAZINE HYDROCHLORIDE 10 MG: 20 INJECTION INTRAMUSCULAR; INTRAVENOUS at 04:15

## 2022-05-09 RX ADMIN — DIPHENHYDRAMINE HYDROCHLORIDE 25 MG: 50 INJECTION, SOLUTION INTRAMUSCULAR; INTRAVENOUS at 10:21

## 2022-05-09 RX ADMIN — HYDRALAZINE HYDROCHLORIDE 100 MG: 50 TABLET ORAL at 22:07

## 2022-05-09 RX ADMIN — LEVOTHYROXINE SODIUM 125 MCG: 0.12 TABLET ORAL at 05:10

## 2022-05-09 RX ADMIN — INSULIN LISPRO 5 UNITS: 100 INJECTION, SOLUTION INTRAVENOUS; SUBCUTANEOUS at 06:56

## 2022-05-09 RX ADMIN — SERTRALINE 100 MG: 100 TABLET, FILM COATED ORAL at 08:13

## 2022-05-09 NOTE — PROGRESS NOTES
Continued Stay Note  [facility]     Patient Name: Zaina Martinez  MRN: [id number]  Today's Date: 5/9/2022    Admit Date: 4/27/2022     Discharge Plan     Row Name 05/09/22 1406       Plan    Plan Comments Followed up with pt's  Marv [phone] via phone to confirm pt's DCP.  Marv stated that someone had talked to them about having pt evaluated for BAR which he wouldlike.  Did discuss SNF with HD onsite and pt's spouse stated that they would prefer for pt to go to BAR at NH.  Did inform pt's spouse that there are certain criteria for acute rehab.  Pt's  did voice understanding.  Will ask [facility] to assess pt for rehab prior to returning home.               Discharge Codes    No documentation.               Expected Discharge Date and Time     Expected Discharge Date Expected Discharge Time    May 9, 2022             VERONICA Boyce

## 2022-05-09 NOTE — PROGRESS NOTES
"[facility] Clinical Pharmacy Services: Vancomycin Monitoring Note    Zaina Martinez is a 56 y.o. female who is on day 11 of pharmacy to dose vancomycin for SSTI per Dr. Soriano's request. Dr. Canas following for sepsis and HD catheter-related infection w/MRSA.    Previous Vancomycin Dose: 500 mg IV once on 5/7 at 2054    Results from last 7 days   Lab Units 05/09/22  0621 05/07/22  0423 05/04/22  0320   VANCOMYCIN RM mcg/mL 14.10 15.90 21.70     Vitals/Labs  Ht: 157.5 cm (62.01\"); Wt: 56.3 kg (124 lb 3.2 oz)   Temp Readings from Last 1 Encounters:   05/09/22 98.5 °F (36.9 °C) (Oral)     Estimated Creatinine Clearance: 16.4 mL/min (A) (by C-G formula based on SCr of 3.4 mg/dL (H)).   Dialysis MWF    Results from last 7 days   Lab Units 05/09/22  0621 05/08/22  0510 05/07/22  0423   CREATININE mg/dL 3.40* 2.02* 3.40*   WBC 10*3/mm3 8.41 8.23 8.50     Assessment/Plan    Current Vancomycin Dose: 500 mg IV once post-HD  Next Level Date and Time: Pre-HD Vanc Random with AM labs on Monday 5/16  We will continue to monitor patient changes and renal function     Thank you for involving pharmacy in this patient's care. Please contact pharmacy with any questions or concerns.       Jeremy Nguyen III, PharmD  Clinical Pharmacist    "

## 2022-05-09 NOTE — PLAN OF CARE
Goal Outcome Evaluation:  Plan of Care Reviewed With: patient           Outcome Evaluation: Pt participated with PT this morning. Pt reporting 9/10 headache pain today but it did not worsen with activity. Pt agreeable to sit EOB and complete LE strengthening exercises. Unable to attempt transfers today due to pain. PT will continue to follow and progress as able.

## 2022-05-09 NOTE — PROGRESS NOTES
Dedicated to Hospital Care    [phone]   LOS: 12 days     Name: Zaina Martinez  Age/Sex: 56 y.o. female  :  1965        PCP: Karson Oreilly MD  Chief Complaint   Patient presents with   • Altered Mental Status     PT WITH AMS, LAST SEEN NORMAL APPROX. 0800 TODAY; PT NOT TALKING, LEFT  WEAKNESS; PT IS ON DIALYSIS BUT HAS MISSED HER LAST APPOINTMENT; PT WEARING FACE MASK      Subjective   Complaining of a pretty severe headache today.  Did not sleep well overnight.  Denies new neurologic symptoms associated with this she says is similar to prior headaches but worse.  She remains alert and oriented x3 and is following commands appropriately    General: No Fever or Chills, Cardiac: No Chest Pain or Palpitations, Resp: No Cough or SOA, GI: No Nausea, Vomiting, or Diarrhea and Other: No bleeding    amLODIPine, 10 mg, Oral, Q24H  carvedilol, 3.125 mg, Oral, BID With Meals  heparin (porcine), 3,000 Units, Intracatheter, Once  hydrALAZINE, 100 mg, Oral, Q8H  insulin lispro, 0-14 Units, Subcutaneous, Q6H  levothyroxine, 125 mcg, Oral, Q AM  pantoprazole, 40 mg, Intravenous, Q AM  rOPINIRole, 0.75 mg, Oral, Nightly  sertraline, 100 mg, Oral, Daily  sodium chloride, 10 mL, Intravenous, Q12H  tamsulosin, 0.4 mg, Oral, Daily  valproate sodium, 1,000 mg, Intravenous, Once  Vancomycin Pharmacy Intermittent/Pulse Dosing, , Does not apply, Daily      Pharmacy to dose vancomycin,         Objective   Vital Signs  Temp:  [97.8 °F (36.6 °C)-98.5 °F (36.9 °C)] 98.5 °F (36.9 °C)  Heart Rate:  [83-94] 83  Resp:  [16-18] 18  BP: (163-178)/(75-94) 163/75  Body mass index is 22.71 kg/m².    Intake/Output Summary (Last 24 hours) at 2022 1120  Last data filed at 2022 0335  Gross per 24 hour   Intake 0 ml   Output --   Net 0 ml       Physical Exam  Vitals and nursing note reviewed.   Constitutional:       Comments: Chronically ill-appearing   Cardiovascular:      Rate and Rhythm: Normal rate and regular rhythm.    Pulmonary:      Effort: No respiratory distress.      Breath sounds: Normal breath sounds.   Abdominal:      General: Bowel sounds are normal. There is no distension.      Palpations: Abdomen is soft.   Skin:     General: Skin is warm and dry.   Neurological:      General: No focal deficit present.      Mental Status: She is alert and oriented to person, place, and time.           Results Review:       I reviewed the patient's new clinical results.  Results from last 7 days   Lab Units 05/09/22 0621 05/08/22 0510 05/07/22 0423 05/06/22 0622 05/05/22  0557 05/04/22  0320 05/03/22  0340   WBC 10*3/mm3 8.41 8.23 8.50 7.95 11.38* 17.72* 16.34*   HEMOGLOBIN g/dL 9.4* 10.1* 9.8* 9.5* 7.9* 9.6* 10.0*   PLATELETS 10*3/mm3 282 335 366 313 245 214 193     Results from last 7 days   Lab Units 05/09/22 0621 05/08/22  0510 05/07/22  0423 05/06/22 0622 05/05/22  0557 05/04/22  0320 05/03/22  0340   SODIUM mmol/L 133* 136 134* 135* 131* 132* 129*   POTASSIUM mmol/L 4.3 3.9 4.0 3.9 3.8 3.9 4.6   CHLORIDE mmol/L 97* 99 98 99 95* 99 96*   CO2 mmol/L 24.5 26.3 24.0 23.7 25.0 19.0* 21.0*   BUN mg/dL 27* 12 27* 17 32* 19 39*   CREATININE mg/dL 3.40* 2.02* 3.40* 2.66* 3.85* 2.93* 5.04*   CALCIUM mg/dL 7.6* 8.1* 7.9* 8.0* 7.8* 7.9* 7.9*   MAGNESIUM mg/dL  --   --   --   --   --   --  1.8   PHOSPHORUS mg/dL 3.0 1.4* 2.5 2.2* 3.3 2.6 3.7   Estimated Creatinine Clearance: 16.4 mL/min (A) (by C-G formula based on SCr of 3.4 mg/dL (H)).  Lab Results   Component Value Date    HGBA1C 5.80 (H) 04/28/2022    HGBA1C 5.80 (H) 02/24/2022    HGBA1C 6.90 (H) 01/10/2022     Glucose   Date/Time Value Ref Range Status   05/09/2022 1111 131 (H) 70 - 130 mg/dL Final     Comment:     Meter: [id number] : [id number] Jose Chavis    05/09/2022 0620 242 (H) 70 - 130 mg/dL Final     Comment:     Meter: [id number] : [id number] Jay Camejo NA   05/08/2022 2201 356 (H) 70 - 130 mg/dL Final     Comment:     Meter: [id number] : [id number] Ruthie  Kirstin NA   05/08/2022 1652 215 (H) 70 - 130 mg/dL Final     Comment:     Meter: [id number] : [id number] Rob Banegas NA   05/08/2022 1107 262 (H) 70 - 130 mg/dL Final     Comment:     Meter: [id number] : [id number] Dilcia Parkinson NA   05/08/2022 0613 147 (H) 70 - 130 mg/dL Final     Comment:     RN Notified R and V Meter: [id number] : [id number] Sree Singh NA   05/07/2022 2108 310 (H) 70 - 130 mg/dL Final     Comment:     RN Notified R and V Meter: [id number] : [id number] Lizet Burgess NA   05/07/2022 1055 255 (H) 70 - 130 mg/dL Final     Comment:     Meter: [id number] : [id number] Gabriela Cortez QUENTIN         Assessment/Plan   Active Hospital Problems    Diagnosis  POA   • **Encephalopathy, toxic [G92.9]  Unknown   • Acute CVA (cerebrovascular accident) (HCC) [I63.9]  Unknown   • Intracranial hemorrhage (HCC) [I62.9]  Unknown   • Acute metabolic encephalopathy [G93.41]  Yes   • Leukocytosis [D72.829]  Yes   • Acute on chronic diastolic CHF (congestive heart failure) ([facility]) [I50.33]  Yes   • CAD (coronary artery disease) [I25.10]  Yes   • ESRD (end stage renal disease) ([facility]) [N18.6]  Yes   • Hypertensive disorder [I10]  Yes   • Acute DM type 2 causing complication (HCC) [E11.8]  Yes      Resolved Hospital Problems   No resolved problems to display.       PLAN  This is a 56-year-old female with a history end-stage renal disease who presented to the hospital after missing some dialysis and unfortunately has had a rather complicated hospitalization with bacteremia and infection in her tunneled dialysis catheter as well as acute stroke during the hospitalization requiring tPA.  -Even with her headache today her mentation is still stable.  She is alert and oriented x3 and is following commands appropriately.  -Cardiology has been consulted for today  -Neurology was called and they are planning a migraine cocktail today we will see how she responds to this  -Appreciate infectious diseases input  regarding the antibiotics.  -Nephrology following for dialysis  -Blood sugars are running a little high now that she is more awake and eating better.  We will reinitiate half her basal insulin from home hesitant to give her entire home dose given how brittle she is.  Blood sugar still running high continue adding scheduled mealtime insulin tomorrow.  -Blood pressures been running high today.  Medication adjustments noted  -Patient is a full code    Disposition  To be determined      Osmar King MD  [facility]  05/09/22  11:20 EDT

## 2022-05-09 NOTE — PROGRESS NOTES
[facility] Progress Note      Patient Name: Zaina Martinez  : 1965  MRN: [id number]  Primary Care Physician:  Karson Oreilly MD  Date of admission: 2022    Subjective     Interval History:   Follow up ESRD  Complaining of headache, denies any chest pain or shortness of breath, no orthopnea or PND, no nausea or vomiting, no abdominal pain.      Objective     Vitals:   Temp:  [97.8 °F (36.6 °C)-98.5 °F (36.9 °C)] 98.5 °F (36.9 °C)  Heart Rate:  [83-94] 83  Resp:  [16-18] 18  BP: (163-178)/(75-94) 163/75  Flow (L/min):  [2] 2    Intake/Output Summary (Last 24 hours) at 2022 0959  Last data filed at 2022 0335  Gross per 24 hour   Intake 0 ml   Output --   Net 0 ml       Physical Exam:   General: Awake, chronically ill, blunted but still oriented  Skin:dry. No rash  HEENT: Nonicteric sclerae, oral mucosa is normal  Neck:  RIJ TDC, no JVD  Lungs:  clear to auscultation. Unlabored.   Heart: RRR no s3 or rub  Abdomen: Normoactive bowel, soft, nontender, ND   Extremities: no edema.    Neuro: Normal speech, moving all extremities    Scheduled Meds:     amLODIPine, 10 mg, Oral, Q24H  carvedilol, 3.125 mg, Oral, BID With Meals  diphenhydrAMINE, 25 mg, Intravenous, Once  heparin (porcine), 3,000 Units, Intracatheter, Once  hydrALAZINE, 100 mg, Oral, Q8H  insulin lispro, 0-14 Units, Subcutaneous, Q6H  levothyroxine, 125 mcg, Oral, Q AM  magnesium sulfate, 2 g, Intravenous, Once  pantoprazole, 40 mg, Intravenous, Q AM  rOPINIRole, 0.75 mg, Oral, Nightly  sertraline, 100 mg, Oral, Daily  sodium chloride, 10 mL, Intravenous, Q12H  tamsulosin, 0.4 mg, Oral, Daily  valproate sodium, 1,000 mg, Intravenous, Once  Vancomycin Pharmacy Intermittent/Pulse Dosing, , Does not apply, Daily      IV Meds:   Pharmacy to dose vancomycin,         Results Reviewed:   I have personally reviewed the results from the time of this admission to 2022 09:59 EDT     Results from last 7 days   Lab Units  05/09/22 0621 05/08/22 0510 05/07/22  0423   SODIUM mmol/L 133* 136 134*   POTASSIUM mmol/L 4.3 3.9 4.0   CHLORIDE mmol/L 97* 99 98   CO2 mmol/L 24.5 26.3 24.0   BUN mg/dL 27* 12 27*   CREATININE mg/dL 3.40* 2.02* 3.40*   CALCIUM mg/dL 7.6* 8.1* 7.9*   GLUCOSE mg/dL 233* 65 74       Estimated Creatinine Clearance: 16.4 mL/min (A) (by C-G formula based on SCr of 3.4 mg/dL (H)).    Results from last 7 days   Lab Units 05/09/22 0621 05/08/22 0510 05/07/22 0423 05/04/22  0320 05/03/22  0340   MAGNESIUM mg/dL  --   --   --   --  1.8   PHOSPHORUS mg/dL 3.0 1.4* 2.5   < > 3.7    < > = values in this interval not displayed.             Results from last 7 days   Lab Units 05/09/22 0621 05/08/22 0510 05/07/22 0423 05/06/22 0622 05/05/22  0557   WBC 10*3/mm3 8.41 8.23 8.50 7.95 11.38*   HEMOGLOBIN g/dL 9.4* 10.1* 9.8* 9.5* 7.9*   PLATELETS 10*3/mm3 282 335 366 313 245             Assessment / Plan     ASSESSMENT:  1.  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   Electrolytes, appears to be euvolemic, sodium 133.  2.  Intracerebral bleed and altered mental status.  Mental status improved significantly.   3.  Infected TDC, s/p removal 5/1. Staph aureus . On vanc, and WBC better.    4.  DM2    5.  Coronary artery disease  6.  Acute on chronic diastolic dysfunction .  Volume status improving  7.  Anemia of CKD, on long-acting ANDRAE as an outpatient.  Hemoglobin today 9.4  8.  HTN, acceptable control today    PLAN:  1.  Hemodialysis tomorrow and continue per [facility] schedule  2.  Surveillance labs      Lei Covington MD  05/09/22  09:59 EDT    [facility]  [phone]

## 2022-05-09 NOTE — PROGRESS NOTES
"    Patient Name: Zaina Martinez  :1965  56 y.o.      Patient Care Team:  Karson Oreilly MD as PCP - General (Family Medicine)    Chief Complaint: MRSA hemodialysis catheter infection    Interval History: Overnight developed acute severe headache.  Just returned from head CT, no abnormality seen.  Patient tells me she has the worst headache that she has ever had.  She does awaken but is lethargic and promptly goes back to sleep.       Objective   Vital Signs  Temp:  [97.6 °F (36.4 °C)-98.5 °F (36.9 °C)] 98.5 °F (36.9 °C)  Heart Rate:  [83-94] 83  Resp:  [16-18] 18  BP: (163-198)/(75-98) 163/75    Intake/Output Summary (Last 24 hours) at 2022 0806  Last data filed at 2022 0335  Gross per 24 hour   Intake 240 ml   Output --   Net 240 ml     Flowsheet Rows    Flowsheet Row First Filed Value   Admission Height 157.5 cm (62\") Documented at 2022 0427   Admission Weight 54.8 kg (120 lb 13 oz) Documented at 2022 0427          Physical Exam:   General Appearance:   Lethargic, ill   Lungs:     Clear to auscultation.  Normal respiratory effort and rate.      Heart:    Regular rhythm and normal rate, normal S1 and S2, no murmurs, gallops or rubs.     Chest Wall:    No abnormalities observed   Abdomen:     Soft, nontender, positive bowel sounds.     Extremities:   no cyanosis, clubbing or edema.  No marked joint deformities.        Results Review:    Results from last 7 days   Lab Units 22  0621   SODIUM mmol/L 133*   POTASSIUM mmol/L 4.3   CHLORIDE mmol/L 97*   CO2 mmol/L 24.5   BUN mg/dL 27*   CREATININE mg/dL 3.40*   GLUCOSE mg/dL 233*   CALCIUM mg/dL 7.6*     Results from last 7 days   Lab Units 22  0557   CK TOTAL U/L 97     Results from last 7 days   Lab Units 22  0621   WBC 10*3/mm3 8.41   HEMOGLOBIN g/dL 9.4*   HEMATOCRIT % 29.8*   PLATELETS 10*3/mm3 282         Results from last 7 days   Lab Units 05/03/22  0340   MAGNESIUM mg/dL 1.8                   Medication Review: "   amLODIPine, 10 mg, Oral, Q24H  carvedilol, 3.125 mg, Oral, BID With Meals  heparin (porcine), 3,000 Units, Intracatheter, Once  hydrALAZINE, 100 mg, Oral, Q8H  insulin lispro, 0-14 Units, Subcutaneous, Q6H  levothyroxine, 125 mcg, Oral, Q AM  pantoprazole, 40 mg, Intravenous, Q AM  rOPINIRole, 0.75 mg, Oral, Nightly  sertraline, 100 mg, Oral, Daily  sodium chloride, 10 mL, Intravenous, Q12H  tamsulosin, 0.4 mg, Oral, Daily  Vancomycin Pharmacy Intermittent/Pulse Dosing, , Does not apply, Daily         Pharmacy to dose vancomycin,         Assessment/Plan   Active Hospital Problems    Diagnosis  POA   • **Encephalopathy, toxic [G92.9]  Unknown   • Acute CVA (cerebrovascular accident) (HCC) [I63.9]  Unknown   • Intracranial hemorrhage (HCC) [I62.9]  Unknown   • Acute metabolic encephalopathy [G93.41]  Yes   • Leukocytosis [D72.829]  Yes   • Acute on chronic diastolic CHF (congestive heart failure) ([facility]) [I50.33]  Yes   • CAD (coronary artery disease) [I25.10]  Yes   • ESRD (end stage renal disease) ([facility]) [N18.6]  Yes   • Hypertensive disorder [I10]  Yes   • Acute DM type 2 causing complication ([facility]) [E11.8]  Yes      Resolved Hospital Problems   No resolved problems to display.     1.  MRSA hemodialysis catheter infection   2.  Left frontal intracerebral hemorrhage status post lytic therapy  3.  End-stage renal disease, on hemodialysis  4.  Altered mental status, multifactorial  5.  Acute on chronic diastolic CHF  6.  Pulmonary hypertension  7.  Reported history of coronary artery disease  8.  COVID pneumonia in April 2022  9.  Rheumatoid arthritis    Patient was scheduled for ANJEL, and has been scheduled for this morning as apparently yesterday she was alert and stable.  However overnight she has had a decline in her mental status.  She currently complains of excruciating headache and appears to be much more lethargic than she was yesterday afternoon.  We will cancel the ANJEL, allow appropriate evaluation, plan on  subsequent ANJEL once overall and mental status is improved.    Ghulam Shook III, MD  [facility]  05/09/22  08:06 EDT

## 2022-05-09 NOTE — PROGRESS NOTES
"DOS: 2022  NAME: Zaina Martinez   : 1965  PCP: Karson Oreilly MD  Chief Complaint   Patient presents with   • Altered Mental Status     PT WITH AMS, LAST SEEN NORMAL APPROX. 0800 TODAY; PT NOT TALKING, LEFT  WEAKNESS; PT IS ON DIALYSIS BUT HAS MISSED HER LAST APPOINTMENT; PT WEARING FACE MASK     Stroke    Subjective: Patient reported headache 10 out of 10, CT head was completed this morning and showed stable left frontal hematoma, no new hemorrhage.  Patient reports headache is currently 9 out of 10, migraine cocktail has been ordered but has not been given yet.  She reports some light sensitivity but no nausea.  The patient is also drowsy. Pt seen in follow up today, however the problem is new to the examiner.        Objective:  Vital signs: /75 (BP Location: Right arm, Patient Position: Lying)   Pulse 83   Temp 98.5 °F (36.9 °C) (Oral)   Resp 18   Ht 157.5 cm (62.01\")   Wt 56.3 kg (124 lb 3.2 oz)   SpO2 99%   BMI 22.71 kg/m²       General appearance: Well developed, well nourished, drowsy, and cooperative.   HEENT: Normocephalic.   Neck: Supple  Cardiac: Regular rate and rhythm. No murmurs.   Peripheral Vasculature: Radial pulses are equal and symmetric.  Chest Exam: Clear to auscultation bilaterally, no wheezes, no rhonchi.  Extremities: Normal, no edema.   Skin: No rashes or birthmarks.     Higher integrative function: Mildly drowsy.  Oriented x3 though mildly delayed in response.  Speech fluent.  No neglect.  Cranial nerves: Visual fields intact, extraocular movements intact, facial sensation symmetric, face symmetric, fold symmetric shoulder shrug, tongue midline.  Motor: Normal strength in all extremities.  No fasciculations, rigidity, spasticity or abnormal movements.   Sensation: Intact to light touch in all extremities.  Station and gait: Deferred.  Coordination: Finger to nose test showed no dysmetria.     Scheduled Meds:amLODIPine, 10 mg, Oral, Q24H  carvedilol, 3.125 mg, " Oral, BID With Meals  heparin (porcine), 3,000 Units, Intracatheter, Once  hydrALAZINE, 100 mg, Oral, Q8H  insulin glargine, 5 Units, Subcutaneous, Nightly  insulin lispro, 0-14 Units, Subcutaneous, Q6H  levothyroxine, 125 mcg, Oral, Q AM  pantoprazole, 40 mg, Intravenous, Q AM  rOPINIRole, 0.75 mg, Oral, Nightly  sertraline, 100 mg, Oral, Daily  sodium chloride, 10 mL, Intravenous, Q12H  tamsulosin, 0.4 mg, Oral, Daily  valproate sodium, 1,000 mg, Intravenous, Once  Vancomycin Pharmacy Intermittent/Pulse Dosing, , Does not apply, Daily      Continuous Infusions:Pharmacy to dose vancomycin,       PRN Meds:.•  acetaminophen  •  acetaminophen  •  dextrose  •  dextrose  •  glucagon (human recombinant)  •  heparin (porcine)  •  hydrALAZINE  •  melatonin  •  nitroglycerin  •  ondansetron **OR** ondansetron  •  Pharmacy to dose vancomycin  •  [COMPLETED] Insert peripheral IV **AND** sodium chloride  •  sodium chloride    Laboratory results:  Lab Results   Component Value Date    GLUCOSE 233 (H) 05/09/2022    CALCIUM 7.6 (L) 05/09/2022     (L) 05/09/2022    K 4.3 05/09/2022    CO2 24.5 05/09/2022    CL 97 (L) 05/09/2022    BUN 27 (H) 05/09/2022    CREATININE 3.40 (H) 05/09/2022    EGFRIFAFRI  01/13/2022      Comment:      <15 Indicative of kidney failure.    EGFRIFNONA 14 (L) 02/28/2022    BCR 7.9 05/09/2022    ANIONGAP 11.5 05/09/2022     Lab Results   Component Value Date    WBC 8.41 05/09/2022    HGB 9.4 (L) 05/09/2022    HCT 29.8 (L) 05/09/2022    MCV 85.6 05/09/2022     05/09/2022     Lab Results   Component Value Date    CHOL 145 04/30/2022     Lab Results   Component Value Date    HDL 44 04/30/2022     Lab Results   Component Value Date    LDL 80 04/30/2022     Lab Results   Component Value Date    TRIG 119 04/30/2022          Review and interpretation of imaging: CT head images viewed by me, shows left frontal hemorrhage, stable.   CT Head Without Contrast    Addendum Date: 5/9/2022    ADDENDUM: 05 09  22 06:49 Call Doctor Regarding Stroke, called Nurse Tesha on 05 09 06:49 (-04:00)     Result Date: 5/9/2022  CR Patient: ELY MOTTA  Time Out: 05:44 Exam(s): CT HEAD Without Contrast EXAM:   CT Head Without Intravenous Contrast CLINICAL HISTORY:    Reason for exam: headache. TECHNIQUE:   Axial computed tomography images of the head brain without intravenous contrast.  CTDI is 54.8 mGy and DLP is 974.4 mGy-cm.  This CT exam was performed according to the principle of ALARA (As Low As Reasonably Achievable) by using one or more of the following dose reduction techniques: automated exposure control, adjustment of the mA and or kV according to patient size, and or use of iterative reconstruction technique. Comparison:  5 1 2022 FINDINGS:   Brain:  Unchanged parenchymal hematoma in the superior left frontal lobe measuring 1.9 x 1.4 x 2.7 cm.  Mild surrounding edema.  No new hemorrhage..   Ventricles:  Unremarkable.  No ventriculomegaly.   Bones joints:  Unremarkable.  No acute fracture.   Soft tissues:  Unremarkable.   Sinuses:  Unremarkable as visualized.  No acute sinusitis.   Mastoid air cells:  Unremarkable as visualized.  No mastoid effusion. IMPRESSION:     Unchanged parenchymal hematoma in the superior left frontal lobe with mild surrounding edema.  No new hemorrhage.    Communications:  Call Doctor Stroke Electronically signed by John Florentino M.D. on 05-09-22 at 0544      Impression:  56-year-old female with HTN, HLD, diabetes, thyroid disease, rheumatoid arthritis, depression, ESRD, CHF, and CAD who was admitted 4/27 with complaints of confusion and poor oral intake.  The confusion had started several days prior to admission and she had also reported missing 2 dialysis sections.  She was found to have an infected dialysis line which was removed and sent for culture which did reveal MRSA so she was initiated on IV antibiotics.  On 4/29 she developed symptoms of acute right MCA syndrome.  She was taken for team  D imaging, CT/CTA/CTP was negative for any retrievable LVO or hemorrhage but did show some diminished perfusion in the right MCA inferior division.  She received IV TNK and subsequently developed a left frontal intracerebral hemorrhage.  The TNK was reversed with cryo.    Additional work-up:  MRI brain without contrast 4/28: No acute findings.  Old left frontal subcortical infarct.  Resolved left cerebral parenchymal abnormality seen on previous MRI from 12/2/2021 which was probably related to PRES.  MRI brain without contrast 4/29: Focal area of acute subacute intracerebral hemorrhage in the left frontal lobe with mild vasogenic edema.  No acute hemic stroke.  2D echo 4/29: EF 66.8%, moderate to severely dilated left atrial cavity, saline test results are negative for shunting.  Aortic valve sclerosis.  Severe mitral annular calcification with mild mitral valve regurgitation.  Mild tricuspid valve regurgitation.  Moderate pulmonary hypertension.  EEG 5/2: Normal in the awake state only.  No potentially epileptogenic activity, seizure activity, or focal slowing.  Excessive electrode artifact noted.  Most recent CT head 5/9: Stable/unchanged parenchymal hematoma in the left frontal lobe with mild surrounding edema, no new hemorrhage.  Labs: Hemoglobin A1c 5.80%, UDS positive for benzos and oxycodone alcohol level not elevated, UA with trace bacteria, 31-50 WBCs, small amount of blood, 1+ leukoesterase, nitrites negative, culture positive for less than 10,000 MRSA.  CK 97.  .  B12 735.    Diagnoses:  Right MCA syndrome status post IV TNKase  Subcortical left frontal ICH  Encephalopathy, suspect toxic/metabolic  Elevated TSH  ESRD on hemodialysis  Dialysis catheter infection  Polypharmacy      Plan:  Giving cocktail of Depacon, Benadryl, and magnesium sulfate to be given today.  Repeat CT head without contrast for any change in neuro status  Continue to avoid antiplatelet/anticoagulation  ANJEL was planned for  today but due to her lethargy cardiology has placed this on hold  Recommend repeat MRI brain in 3 months.  She also has follow-up scheduled with Dr. Tovar  Neuroccinthia  BP control, SBP goal less than 160  Stroke Education  AYAKA/SCDs  PT/OT/ST  D/W Dr Lester today. Will follow.

## 2022-05-09 NOTE — PROGRESS NOTES
LOS: 12 days     Chief Complaint: Sepsis    Interval History: Patient reports she is having a significant headache this morning.  Denies any fevers or chills.  Requesting pain medication for headache.  Denies any concern for confusion.    Fever curve within normal limits.  Blood cultures have been no growth.  No leukocytosis.    Vital Signs  Temp:  [97.8 °F (36.6 °C)-98.5 °F (36.9 °C)] 98.5 °F (36.9 °C)  Heart Rate:  [83-94] 83  Resp:  [16-18] 18  BP: (163-178)/(75-94) 163/75    Physical Exam:  General: In no acute distress  Cardiovascular: RRR, no LE edema   Respiratory: Clear to ascultation bilaterally  GI: Soft, NT/ND, + bowel sounds bilaterally  Skin: No rashes.  New catheter site clean and intact.    Antibiotics:  Vancomycin dosing per pharmacy     Results Review:    Lab Results   Component Value Date    WBC 8.41 05/09/2022    HGB 9.4 (L) 05/09/2022    HCT 29.8 (L) 05/09/2022    MCV 85.6 05/09/2022     05/09/2022     Lab Results   Component Value Date    GLUCOSE 233 (H) 05/09/2022    BUN 27 (H) 05/09/2022    CREATININE 3.40 (H) 05/09/2022    EGFRIFNONA 14 (L) 02/28/2022    EGFRIFAFRI  01/13/2022      Comment:      <15 Indicative of kidney failure.    BCR 7.9 05/09/2022    CO2 24.5 05/09/2022    CALCIUM 7.6 (L) 05/09/2022    ALBUMIN 2.80 (L) 05/09/2022    AST 30 04/27/2022    ALT 14 04/27/2022       Microbiology:  4/27 urine culture <10K MRSA  4/28 blood cultures NGTD   4/28 catheter tip culture from hemodialysis catheter with MRSA  5/5 blood cultures no growth     Assessment/Plan   #Hemodialysis catheter related infection with MRSA, now removed  #Left frontal ICH  #Acute encephalopathy  #Type 2 diabetes  #ESRD on hemodialysis    Continue intermittent dosed IV vancomycin goal -600 for MRSA infection associated with her hemodialysis catheter. Appreciate pharmacy's help with dosing.  ANJLE canceled for today due to mental status.  Tentative plan would be for 4 weeks of IV vancomycin with dialysis  however we will follow-up possible ANJEL as this may alter her duration.    ID will follow.

## 2022-05-09 NOTE — THERAPY TREATMENT NOTE
Patient Name: Zaina Martinez  : 1965    MRN: [id number]                              Today's Date: 2022       Admit Date: 2022    Visit Dx:     ICD-10-CM ICD-9-CM   1. Acute metabolic encephalopathy  G93.41 348.31   2. ESRD (end stage renal disease) on dialysis (Formerly [facility])  N18.6 585.6    Z99.2 V45.11   3. Acute hypoxemic respiratory failure (Formerly [facility])  J96.01 518.81   4. Hyperkalemia  E87.5 276.7   5. ESRD (end stage renal disease) (Formerly [facility])  N18.6 585.6     Patient Active Problem List   Diagnosis   • Renal insufficiency   • Hypertensive disorder   • Hypothyroidism   • Acute DM type 2 causing complication ([facility]ly [facility])   • Rheumatoid arthritis ([facility]ly [facility])   • Angioedema   • Esophageal dysmotility   • Anemia   • Medically noncompliant   • Myocardial infarction due to demand ischemia ([facility]ly [facility])   • Enteritis   • PRES (posterior reversible encephalopathy syndrome)   • Urine retention   • Klebsiella infection   • Superficial thrombophlebitis   • Generalized weakness   • ESRD (end stage renal disease) ([facility]ly [facility])   • CAD (coronary artery disease)   • Abnormal urinalysis   • Acute on chronic diastolic CHF (congestive heart failure) ([facility]ly [facility])   • Pyelonephritis   • Calculus of gallbladder with acute on chronic cholecystitis without obstruction   • Pleural effusion on right   • Anemia due to chronic kidney disease, on chronic dialysis ([facility])   • Abnormal findings on diagnostic imaging of other specified body structures   • Acute upper respiratory infection   • Agitation   • Alkaline phosphatase raised   • Casts present in urine   • Cellulitis of toe   • Hip pain   • Community acquired pneumonia   • Depressive disorder   • Diarrhea of presumed infectious origin   • Difficult or painful urination   • Disease due to severe acute respiratory syndrome coronavirus 2 (SARS-CoV-2)   • Dyspnea   • Encounter for follow-up examination after completed treatment for conditions other than malignant neoplasm   • H/O: hypothyroidism   • Hyperlipidemia   •  Hypomagnesemia   • Intractable vomiting with nausea   • Leukocytosis   • Luetscher's syndrome   • Need for influenza vaccination   • Restless legs   • Noncompliance with treatment   • Shoulder pain   • Urinary tract infectious disease   • Metabolic encephalopathy   • Abnormal findings on diagnostic imaging of abdomen   • Status post cholecystectomy   • Hyponatremia   • Leukocytosis   • Acute metabolic encephalopathy   • Encephalopathy, toxic   • Acute CVA (cerebrovascular accident) (HCC)   • Intracranial hemorrhage (HCC)     Past Medical History:   Diagnosis Date   • Acute CVA (cerebrovascular accident) (HCC) 5/1/2022   • Acute on chronic diastolic CHF (congestive heart failure) (HCC)    • CAD (coronary artery disease) 12/20/2021   • Diabetes (HCC)    • Disease of thyroid gland    • GERD (gastroesophageal reflux disease)    • Hyperlipidemia 11/30/2018   • Hypertension    • Rheumatoid arthritis (HCC)      Past Surgical History:   Procedure Laterality Date   • CHOLECYSTECTOMY WITH INTRAOPERATIVE CHOLANGIOGRAM N/A 1/10/2022    Procedure: Laparoscopic cholecystectomy with intraoperative cholangiogram;  Surgeon: Ramana Raygoza MD;  Location: [facility];  Service: General;  Laterality: N/A;   • EYE SURGERY     • HYSTERECTOMY     • INSERTION HEMODIALYSIS CATHETER N/A 12/6/2021    Procedure: HEMODIALYSIS CATHETER INSERTION;  Surgeon: Keli Salazar MD;  Location: [facility] 18/19;  Service: Vascular;  Laterality: N/A;   • INSERTION HEMODIALYSIS CATHETER N/A 5/3/2022    Procedure: TUNNELED CATHETER PLACEMENT;  Surgeon: Keli Salazar MD;  Location: [facility];  Service: Vascular;  Laterality: N/A;      General Information     Row Name 05/09/22 1424          Physical Therapy Time and Intention    Document Type therapy note (daily note)  -CF     Mode of Treatment physical therapy;individual therapy  -CF     Row Name 05/09/22 1424          General Information    Patient Profile Reviewed yes  -CF      Existing Precautions/Restrictions fall  -CF     Row Name 05/09/22 1424          Cognition    Orientation Status (Cognition) oriented x 3  -CF     Row Name 05/09/22 1424          Safety Issues, Functional Mobility    Safety Issues Affecting Function (Mobility) insight into deficits/self-awareness  -CF     Impairments Affecting Function (Mobility) balance;endurance/activity tolerance;postural/trunk control;strength;pain  -CF           User Key  (r) = Recorded By, (t) = Taken By, (c) = Cosigned By    Initials Name Provider Type     Samantha Zarate, VICKIE Physical Therapist               Mobility     Row Name 05/09/22 1424          Bed Mobility    Bed Mobility supine-sit;sit-supine  -CF     Supine-Sit Wheeler (Bed Mobility) minimum assist (75% patient effort)  -CF     Sit-Supine Wheeler (Bed Mobility) minimum assist (75% patient effort)  -CF     Comment, (Bed Mobility) tolerated sitting EOB aprox 7-8 mins  -CF     Row Name 05/09/22 1424          Transfers    Comment, (Transfers) not agreeable this date as pt reports severe HA  -CF           User Key  (r) = Recorded By, (t) = Taken By, (c) = Cosigned By    Initials Name Provider Type     Samantha Zarate PT Physical Therapist               Obj/Interventions     Row Name 05/09/22 1425          Motor Skills    Therapeutic Exercise other (see comments)  B AP, laq, tristin x10 reps  -CF           User Key  (r) = Recorded By, (t) = Taken By, (c) = Cosigned By    Initials Name Provider Type    Samantha Pina, VICKIE Physical Therapist               Goals/Plan    No documentation.                Clinical Impression     Row Name 05/09/22 1425          Pain    Pretreatment Pain Rating 9/10  -CF     Posttreatment Pain Rating 9/10  -CF     Pain Location - head  -CF     Pre/Posttreatment Pain Comment reports headache, does not worsen and improve with activity  -CF     Pain Intervention(s) Repositioned;Ambulation/increased activity;Rest  -CF     Row Name 05/09/22  1425          Plan of Care Review    Plan of Care Reviewed With patient  -CF     Outcome Evaluation Pt participated with PT this morning. Pt reporting 9/10 headache pain today but it did not worsen with activity. Pt agreeable to sit EOB and complete LE strengthening exercises. Unable to attempt transfers today due to pain. PT will continue to follow and progress as able.  -CF     Row Name 05/09/22 1425          Vital Signs    O2 Delivery Pre Treatment room air  -CF     Row Name 05/09/22 1425          Positioning and Restraints    Pre-Treatment Position in bed  -CF     Post Treatment Position bed  -CF     In Bed notified nsg;call light within reach;encouraged to call for assist;exit alarm on;fowlers  -CF           User Key  (r) = Recorded By, (t) = Taken By, (c) = Cosigned By    Initials Name Provider Type    CF Samantha Zarate PT Physical Therapist               Outcome Measures     Row Name 05/09/22 1428          How much help from another person do you currently need...    Turning from your back to your side while in flat bed without using bedrails? 3  -CF     Moving from lying on back to sitting on the side of a flat bed without bedrails? 3  -CF     Moving to and from a bed to a chair (including a wheelchair)? 3  -CF     Standing up from a chair using your arms (e.g., wheelchair, bedside chair)? 3  -CF     Climbing 3-5 steps with a railing? 2  -CF     To walk in hospital room? 3  -CF     AM-PAC 6 Clicks Score (PT) 17  -CF     Highest level of mobility 5 --> Static standing  -CF     Row Name 05/09/22 1428          Functional Assessment    Outcome Measure Options AM-PAC 6 Clicks Basic Mobility (PT)  -CF           User Key  (r) = Recorded By, (t) = Taken By, (c) = Cosigned By    Initials Name Provider Type    Samantha Pina PT Physical Therapist                             Physical Therapy Education                 Title: PT OT SLP Therapies (In Progress)     Topic: Physical Therapy (Done)     Point:  Mobility training (Done)     Learning Progress Summary           Patient Acceptance, E, VU,NR by CF at 5/9/2022 1429    Acceptance, E, VU,NR by CF at 5/6/2022 1346    Acceptance, E,TB,D, VU,DU,NR by KA at 5/4/2022 1332    Nonacceptance, E,TB, NR by KA at 5/2/2022 1121                   Point: Home exercise program (Done)     Learning Progress Summary           Patient Acceptance, E, VU,NR by CF at 5/9/2022 1429    Acceptance, E, VU,NR by CF at 5/6/2022 1346    Acceptance, E,TB,D, VU,DU,NR by KA at 5/4/2022 1332    Nonacceptance, E,TB, NR by KA at 5/2/2022 1121                   Point: Body mechanics (Done)     Learning Progress Summary           Patient Acceptance, E, VU,NR by CF at 5/6/2022 1346    Acceptance, E,TB,D, VU,DU,NR by KA at 5/4/2022 1332    Nonacceptance, E,TB, NR by KA at 5/2/2022 1121                   Point: Precautions (Done)     Learning Progress Summary           Patient Acceptance, E, VU,NR by  at 5/6/2022 1346    Acceptance, E,TB,D, VU,DU,NR by KA at 5/4/2022 1332    Nonacceptance, E,TB, NR by KA at 5/2/2022 1121                               User Key     Initials Effective Dates Name Provider Type Discipline     06/16/21 -  Samantha Zarate, PT Physical Therapist PT     03/11/22 -  Mary Neal, PT Student PT Student PT              PT Recommendation and Plan     Plan of Care Reviewed With: patient  Outcome Evaluation: Pt participated with PT this morning. Pt reporting 9/10 headache pain today but it did not worsen with activity. Pt agreeable to sit EOB and complete LE strengthening exercises. Unable to attempt transfers today due to pain. PT will continue to follow and progress as able.     Time Calculation:    PT Charges     Row Name 05/09/22 1423             Time Calculation    Start Time 0940  -CF      Stop Time 0952  -      Time Calculation (min) 12 min  -      PT Received On 05/09/22  -      PT - Next Appointment 05/10/22  -            User Key  (r) = Recorded By, (t) =  Taken By, (c) = Cosigned By    Initials Name Provider Type    CF Samantha Zarate, PT Physical Therapist              Therapy Charges for Today     Code Description Service Date Service Provider Modifiers Qty    60574742709 HC PT THER PROC EA 15 MIN 5/9/2022 Samantha Zarate, PT GP 1          PT G-Codes  Outcome Measure Options: AM-PAC 6 Clicks Basic Mobility (PT)  AM-PAC 6 Clicks Score (PT): 17  AM-PAC 6 Clicks Score (OT): 15  Modified Kosse Scale: 5 - Severe disability.  Bedridden, incontinent, and requiring constant nursing care and attention.    Samantha Zarate, VICKIE  5/9/2022

## 2022-05-09 NOTE — PLAN OF CARE
Goal Outcome Evaluation:      C/o HA today- migrane cocktail given. ANJEL cancelled today d/t headache. Dialysis today. Few episodes of diarrhea today prior to dialysis and during dialysis according to report.

## 2022-05-09 NOTE — PLAN OF CARE
Goal Outcome Evaluation:              Outcome Evaluation: SLP arrived for speech/cognitive eval. Pt ROEL in dialysis. Will f/u in the next couple days as able.

## 2022-05-09 NOTE — NURSING NOTE
hd without incident or c/o. tolerated well. removed 3 L. no meds administered. drsg current, cdi with biopatch. stable, no c/o upon completion of treatment.

## 2022-05-10 LAB
ALBUMIN SERPL-MCNC: 2.8 G/DL (ref 3.5–5.2)
ANION GAP SERPL CALCULATED.3IONS-SCNC: 10.2 MMOL/L (ref 5–15)
BACTERIA SPEC AEROBE CULT: NORMAL
BACTERIA SPEC AEROBE CULT: NORMAL
BASOPHILS # BLD AUTO: 0.06 10*3/MM3 (ref 0–0.2)
BASOPHILS NFR BLD AUTO: 0.7 % (ref 0–1.5)
BUN SERPL-MCNC: 10 MG/DL (ref 6–20)
BUN/CREAT SERPL: 4.7 (ref 7–25)
CALCIUM SPEC-SCNC: 8.2 MG/DL (ref 8.6–10.5)
CHLORIDE SERPL-SCNC: 102 MMOL/L (ref 98–107)
CO2 SERPL-SCNC: 23.8 MMOL/L (ref 22–29)
CREAT SERPL-MCNC: 2.12 MG/DL (ref 0.57–1)
DEPRECATED RDW RBC AUTO: 52.5 FL (ref 37–54)
EGFRCR SERPLBLD CKD-EPI 2021: 26.9 ML/MIN/1.73
EOSINOPHIL # BLD AUTO: 0.15 10*3/MM3 (ref 0–0.4)
EOSINOPHIL NFR BLD AUTO: 1.8 % (ref 0.3–6.2)
ERYTHROCYTE [DISTWIDTH] IN BLOOD BY AUTOMATED COUNT: 17.1 % (ref 12.3–15.4)
GLUCOSE BLDC GLUCOMTR-MCNC: 122 MG/DL (ref 70–130)
GLUCOSE BLDC GLUCOMTR-MCNC: 166 MG/DL (ref 70–130)
GLUCOSE BLDC GLUCOMTR-MCNC: 187 MG/DL (ref 70–130)
GLUCOSE BLDC GLUCOMTR-MCNC: 243 MG/DL (ref 70–130)
GLUCOSE SERPL-MCNC: 237 MG/DL (ref 65–99)
HCT VFR BLD AUTO: 28.5 % (ref 34–46.6)
HGB BLD-MCNC: 9.1 G/DL (ref 12–15.9)
IMM GRANULOCYTES # BLD AUTO: 0.24 10*3/MM3 (ref 0–0.05)
IMM GRANULOCYTES NFR BLD AUTO: 2.9 % (ref 0–0.5)
LYMPHOCYTES # BLD AUTO: 1.39 10*3/MM3 (ref 0.7–3.1)
LYMPHOCYTES NFR BLD AUTO: 16.5 % (ref 19.6–45.3)
MCH RBC QN AUTO: 26.9 PG (ref 26.6–33)
MCHC RBC AUTO-ENTMCNC: 31.9 G/DL (ref 31.5–35.7)
MCV RBC AUTO: 84.3 FL (ref 79–97)
MONOCYTES # BLD AUTO: 0.6 10*3/MM3 (ref 0.1–0.9)
MONOCYTES NFR BLD AUTO: 7.1 % (ref 5–12)
NEUTROPHILS NFR BLD AUTO: 5.98 10*3/MM3 (ref 1.7–7)
NEUTROPHILS NFR BLD AUTO: 71 % (ref 42.7–76)
NRBC BLD AUTO-RTO: 0.1 /100 WBC (ref 0–0.2)
PHOSPHATE SERPL-MCNC: 1.7 MG/DL (ref 2.5–4.5)
PLATELET # BLD AUTO: 271 10*3/MM3 (ref 140–450)
PMV BLD AUTO: 9.5 FL (ref 6–12)
POTASSIUM SERPL-SCNC: 3.8 MMOL/L (ref 3.5–5.2)
RBC # BLD AUTO: 3.38 10*6/MM3 (ref 3.77–5.28)
SODIUM SERPL-SCNC: 136 MMOL/L (ref 136–145)
WBC NRBC COR # BLD: 8.42 10*3/MM3 (ref 3.4–10.8)

## 2022-05-10 PROCEDURE — 63710000001 INSULIN LISPRO (HUMAN) PER 5 UNITS: Performed by: INTERNAL MEDICINE

## 2022-05-10 PROCEDURE — 85025 COMPLETE CBC W/AUTO DIFF WBC: CPT | Performed by: INTERNAL MEDICINE

## 2022-05-10 PROCEDURE — 97535 SELF CARE MNGMENT TRAINING: CPT

## 2022-05-10 PROCEDURE — 99232 SBSQ HOSP IP/OBS MODERATE 35: CPT | Performed by: STUDENT IN AN ORGANIZED HEALTH CARE EDUCATION/TRAINING PROGRAM

## 2022-05-10 PROCEDURE — 25010000002 HYDRALAZINE PER 20 MG: Performed by: INTERNAL MEDICINE

## 2022-05-10 PROCEDURE — 82962 GLUCOSE BLOOD TEST: CPT

## 2022-05-10 PROCEDURE — 99232 SBSQ HOSP IP/OBS MODERATE 35: CPT | Performed by: NURSE PRACTITIONER

## 2022-05-10 PROCEDURE — 92523 SPEECH SOUND LANG COMPREHEN: CPT

## 2022-05-10 PROCEDURE — 80069 RENAL FUNCTION PANEL: CPT | Performed by: INTERNAL MEDICINE

## 2022-05-10 PROCEDURE — 99232 SBSQ HOSP IP/OBS MODERATE 35: CPT | Performed by: INTERNAL MEDICINE

## 2022-05-10 RX ORDER — ALBUMIN (HUMAN) 12.5 G/50ML
12.5 SOLUTION INTRAVENOUS AS NEEDED
Status: CANCELLED | OUTPATIENT
Start: 2022-05-11 | End: 2022-05-11

## 2022-05-10 RX ORDER — DIPHENOXYLATE HYDROCHLORIDE AND ATROPINE SULFATE 2.5; .025 MG/1; MG/1
1 TABLET ORAL 4 TIMES DAILY PRN
Status: DISCONTINUED | OUTPATIENT
Start: 2022-05-10 | End: 2022-05-13 | Stop reason: HOSPADM

## 2022-05-10 RX ADMIN — HYDRALAZINE HYDROCHLORIDE 10 MG: 20 INJECTION INTRAMUSCULAR; INTRAVENOUS at 00:59

## 2022-05-10 RX ADMIN — HYDRALAZINE HYDROCHLORIDE 100 MG: 50 TABLET ORAL at 06:40

## 2022-05-10 RX ADMIN — DIPHENOXYLATE HYDROCHLORIDE AND ATROPINE SULFATE 1 TABLET: 2.5; .025 TABLET ORAL at 00:23

## 2022-05-10 RX ADMIN — Medication 1 PACKET: at 20:07

## 2022-05-10 RX ADMIN — HYDRALAZINE HYDROCHLORIDE 100 MG: 50 TABLET ORAL at 14:23

## 2022-05-10 RX ADMIN — PANTOPRAZOLE SODIUM 40 MG: 40 INJECTION, POWDER, FOR SOLUTION INTRAVENOUS at 06:40

## 2022-05-10 RX ADMIN — Medication 1 PACKET: at 14:23

## 2022-05-10 RX ADMIN — INSULIN LISPRO 5 UNITS: 100 INJECTION, SOLUTION INTRAVENOUS; SUBCUTANEOUS at 06:38

## 2022-05-10 RX ADMIN — LEVOTHYROXINE SODIUM 125 MCG: 0.12 TABLET ORAL at 06:40

## 2022-05-10 RX ADMIN — CARVEDILOL 3.12 MG: 3.12 TABLET, FILM COATED ORAL at 07:52

## 2022-05-10 RX ADMIN — ROPINIROLE HYDROCHLORIDE 0.75 MG: 0.5 TABLET, FILM COATED ORAL at 20:08

## 2022-05-10 RX ADMIN — TAMSULOSIN HYDROCHLORIDE 0.4 MG: 0.4 CAPSULE ORAL at 07:53

## 2022-05-10 RX ADMIN — CARVEDILOL 3.12 MG: 3.12 TABLET, FILM COATED ORAL at 17:54

## 2022-05-10 RX ADMIN — DIPHENOXYLATE HYDROCHLORIDE AND ATROPINE SULFATE 1 TABLET: 2.5; .025 TABLET ORAL at 06:40

## 2022-05-10 RX ADMIN — DIPHENOXYLATE HYDROCHLORIDE AND ATROPINE SULFATE 1 TABLET: 2.5; .025 TABLET ORAL at 20:08

## 2022-05-10 RX ADMIN — AMLODIPINE BESYLATE 10 MG: 10 TABLET ORAL at 07:53

## 2022-05-10 RX ADMIN — INSULIN GLARGINE-YFGN 5 UNITS: 100 INJECTION, SOLUTION SUBCUTANEOUS at 20:06

## 2022-05-10 RX ADMIN — SERTRALINE 100 MG: 100 TABLET, FILM COATED ORAL at 07:52

## 2022-05-10 RX ADMIN — INSULIN LISPRO 3 UNITS: 100 INJECTION, SOLUTION INTRAVENOUS; SUBCUTANEOUS at 17:55

## 2022-05-10 RX ADMIN — HYDRALAZINE HYDROCHLORIDE 100 MG: 50 TABLET ORAL at 22:39

## 2022-05-10 RX ADMIN — DIPHENOXYLATE HYDROCHLORIDE AND ATROPINE SULFATE 1 TABLET: 2.5; .025 TABLET ORAL at 12:03

## 2022-05-10 RX ADMIN — Medication 10 ML: at 20:09

## 2022-05-10 NOTE — THERAPY EVALUATION
Acute Care - Speech Language Pathology Initial Evaluation  [facility]     Patient Name: Zaina Martinez  : 1965  MRN: [id number]  Today's Date: 5/10/2022               Admit Date: 2022     Visit Dx:    ICD-10-CM ICD-9-CM   1. Acute metabolic encephalopathy  G93.41 348.31   2. ESRD (end stage renal disease) on dialysis ([facility])  N18.6 585.6    Z99.2 V45.11   3. Acute hypoxemic respiratory failure ([facility])  J96.01 518.81   4. Hyperkalemia  E87.5 276.7   5. ESRD (end stage renal disease) ([facility])  N18.6 585.6     Patient Active Problem List   Diagnosis   • Renal insufficiency   • Hypertensive disorder   • Hypothyroidism   • Acute DM type 2 causing complication ([facility])   • Rheumatoid arthritis ([facility])   • Angioedema   • Esophageal dysmotility   • Anemia   • Medically noncompliant   • Myocardial infarction due to demand ischemia ([facility])   • Enteritis   • PRES (posterior reversible encephalopathy syndrome)   • Urine retention   • Klebsiella infection   • Superficial thrombophlebitis   • Generalized weakness   • ESRD (end stage renal disease) ([facility])   • CAD (coronary artery disease)   • Abnormal urinalysis   • Acute on chronic diastolic CHF (congestive heart failure) ([facility])   • Pyelonephritis   • Calculus of gallbladder with acute on chronic cholecystitis without obstruction   • Pleural effusion on right   • Anemia due to chronic kidney disease, on chronic dialysis ([facility])   • Abnormal findings on diagnostic imaging of other specified body structures   • Acute upper respiratory infection   • Agitation   • Alkaline phosphatase raised   • Casts present in urine   • Cellulitis of toe   • Hip pain   • Community acquired pneumonia   • Depressive disorder   • Diarrhea of presumed infectious origin   • Difficult or painful urination   • Disease due to severe acute respiratory syndrome coronavirus 2 (SARS-CoV-2)   • Dyspnea   • Encounter for follow-up examination after completed treatment for conditions other than malignant neoplasm   •  H/O: hypothyroidism   • Hyperlipidemia   • Hypomagnesemia   • Intractable vomiting with nausea   • Leukocytosis   • Luetscher's syndrome   • Need for influenza vaccination   • Restless legs   • Noncompliance with treatment   • Shoulder pain   • Urinary tract infectious disease   • Metabolic encephalopathy   • Abnormal findings on diagnostic imaging of abdomen   • Status post cholecystectomy   • Hyponatremia   • Leukocytosis   • Acute metabolic encephalopathy   • Encephalopathy, toxic   • Acute CVA (cerebrovascular accident) (HCC)   • Intracranial hemorrhage (HCC)     Past Medical History:   Diagnosis Date   • Acute CVA (cerebrovascular accident) (HCC) 5/1/2022   • Acute on chronic diastolic CHF (congestive heart failure) (HCC)    • CAD (coronary artery disease) 12/20/2021   • Diabetes (HCC)    • Disease of thyroid gland    • GERD (gastroesophageal reflux disease)    • Hyperlipidemia 11/30/2018   • Hypertension    • Rheumatoid arthritis (HCC)      Past Surgical History:   Procedure Laterality Date   • CHOLECYSTECTOMY WITH INTRAOPERATIVE CHOLANGIOGRAM N/A 1/10/2022    Procedure: Laparoscopic cholecystectomy with intraoperative cholangiogram;  Surgeon: Ramana Raygoza MD;  Location: [facility];  Service: General;  Laterality: N/A;   • EYE SURGERY     • HYSTERECTOMY     • INSERTION HEMODIALYSIS CATHETER N/A 12/6/2021    Procedure: HEMODIALYSIS CATHETER INSERTION;  Surgeon: Keli Salazar MD;  Location: [facility] 18/19;  Service: Vascular;  Laterality: N/A;   • INSERTION HEMODIALYSIS CATHETER N/A 5/3/2022    Procedure: TUNNELED CATHETER PLACEMENT;  Surgeon: Keli Salazar MD;  Location: [facility];  Service: Vascular;  Laterality: N/A;       SLP Recommendation and Plan  SLP Diagnosis: Moderate expressive and mild-moderate receptive language deficits (05/10/22 1400)        SLC Criteria for Skilled Therapy Interventions Met: yes (05/10/22 1400)  Anticipated Discharge Disposition (SLP):  anticipate therapy at next level of care (05/10/22 1400)        Predicted Duration Therapy Intervention (Days): until discharge (05/10/22 1400)           Communication Strategy Suggestions: eliminate distractions when speaking with patient (05/10/22 1400)        Plan of Care Reviewed With: patient (05/10/22 1432)  Outcome Evaluation: Speech/language eval completed. The patient appears to present with mild-moderate receptive and moderate expressive language deficits. Feel performance impacted by lethargy this date, and it is unclear how much her issues are exacerbated by encephalopathy from infection. Ongoing re-assessment is advised. Pt will need continued SLP service in the hospital and at next level of care. (05/10/22 1432)      SLP EVALUATION (last 72 hours)     SLP SLC Evaluation     Row Name 05/10/22 1400                   Communication Assessment/Intervention    Document Type evaluation  -CP        Subjective Information no complaints  -CP        Patient Observations alert;cooperative  -CP        Patient Effort good  -CP        Symptoms Noted During/After Treatment none  -CP                  General Information    Patient Profile Reviewed yes  -CP        Pertinent History Of Current Problem L ICH, toxic encephalopathy, ESRD w/dialysis  -CP        Precautions/Limitations, Vision WFL  -CP        Precautions/Limitations, Hearing WFL  -CP        Prior Level of Function-Communication WFL  -CP        Plans/Goals Discussed with patient  -CP        Barriers to Rehab medically complex  -CP        Patient's Goals for Discharge patient did not state  -CP        Standardized Assessment Used other (see comments)  unable to participate  -CP                  Pain Scale: FACES Pre/Post-Treatment    Pain: FACES Scale, Pretreatment 0-->no hurt  -CP        Posttreatment Pain Rating 0-->no hurt  -CP                  Comprehension Assessment/Intervention    Comprehension Assessment/Intervention Auditory Comprehension  -CP                   Auditory Comprehension Assessment/Intervention    Auditory Comprehension (Communication) mild impairment;moderate impairment  -CP        Able to Identify Objects/Pictures (Communication) WFL  -CP        Answers Questions (Communication) complex;yes/no;WFL  -CP        Able to Follow Commands (Communication) 2-step;WFL  -CP        Narrative Discourse simple paragraph level;moderate impairment  -CP        Auditory Comprehension Communication, Comment Slow processing of spoken language w/delayed responses- difficult at times to differentiate slow processing vs slow response formulation  -CP                  Expression Assessment/Intervention    Expression Assessment/Intervention verbal expression  -CP                  Verbal Expression Assessment/Intervention    Verbal Expression moderate impairment  -CP        Automatic Speech (Communication) WFL  -CP        Repetition words;phrases;WFL;sentences;mild impairment  -CP        Responsive Naming WFL  -CP        Confrontational Naming WFL  -CP        Conversational Discourse/Fluency moderate impairment  -CP        Verbal Expression, Comment Slow sentence formulation; able to name 4 fruits in :60; difficulty retelling information from simple paragraph  -CP                  Oral Musculature and Cranial Nerve Assessment    Oral Motor General Assessment WFL  -CP                  Motor Speech Assessment/Intervention    Motor Speech Function WFL  -CP                  SLP Evaluation Clinical Impressions    SLP Diagnosis Moderate expressive and mild-moderate receptive language deficits  -CP        Rehab Potential/Prognosis good  -CP        SLC Criteria for Skilled Therapy Interventions Met yes  -CP        Functional Impact difficulty communicating wants, needs;difficulty communicating in an emergency;difficulty in expressing complex messages;restrictions in personal and social life  -CP                  Recommendations    Therapy Frequency (SLP SLC) PRN  -CP         Predicted Duration Therapy Intervention (Days) until discharge  -CP        Anticipated Discharge Disposition (SLP) anticipate therapy at next level of care  -CP        Communication Strategy Suggestions eliminate distractions when speaking with patient  -CP                  Auditory Comprehension Treatment Objectives    Comprehend Narrative Discourse Selection comprehend narrative discourse, SLP goal 1  -CP                  Comprehend Narrative Discourse Goal 1 (SLP)    Improve Comprehension of Narrative Discourse Goal 1 (SLP) respond appropriately to simple conversational questions;respond appropriately to paragraph level conversational discourse;80%;with minimal cues (75-90%)  -CP        Time Frame (Comprehend Narrative Discourse Goal 1, SLP) by discharge  -CP                  Verbal Expression Treatment Objectives    Word Retrieval Skills Selection word retrieval, SLP goal 1  -CP        Phrase and Sentence Level Response Selection phrase and sentence level response, SLP goal 1  -CP                  Word Retrieval Skills Goal 1 (SLP)    Improve Word Retrieval Skills By Goal 1 (SLP) completing open ended structured sentence;completing a divergent task;completing a convergent task;90%;independently (over 90% accuracy)  -CP        Time Frame (Word Retrieval Goal 1, SLP) by discharge  -CP                  Ability to Construct Phrase and Sentence Level Response Goal 1 (SLP)    Improve Ability to Construct Phrase and Sentence Level Responses By Goal 1 (SLP) constructing a sentence with a key word;90%;independently (over 90% accuracy)  -CP        Time Frame (Phrase and Sentence Level Response Goal 1, SLP) by discharge  -CP              User Key  (r) = Recorded By, (t) = Taken By, (c) = Cosigned By    Initials Name Effective Dates    CP Caroline Bacon MS CCC-SLP 06/16/21 -                    EDUCATION  The patient has been educated in the following areas:     Communication Impairment.           SLP GOALS     Row Name  05/10/22 1400             Comprehend Narrative Discourse Goal 1 (SLP)    Improve Comprehension of Narrative Discourse Goal 1 (SLP) respond appropriately to simple conversational questions;respond appropriately to paragraph level conversational discourse;80%;with minimal cues (75-90%)  -CP      Time Frame (Comprehend Narrative Discourse Goal 1, SLP) by discharge  -CP              Word Retrieval Skills Goal 1 (SLP)    Improve Word Retrieval Skills By Goal 1 (SLP) completing open ended structured sentence;completing a divergent task;completing a convergent task;90%;independently (over 90% accuracy)  -CP      Time Frame (Word Retrieval Goal 1, SLP) by discharge  -CP              Ability to Construct Phrase and Sentence Level Response Goal 1 (SLP)    Improve Ability to Construct Phrase and Sentence Level Responses By Goal 1 (SLP) constructing a sentence with a key word;90%;independently (over 90% accuracy)  -CP      Time Frame (Phrase and Sentence Level Response Goal 1, SLP) by discharge  -CP            User Key  (r) = Recorded By, (t) = Taken By, (c) = Cosigned By    Initials Name Provider Type    Caroline Mckeon MS CCC-SLP Speech and Language Pathologist                SLP Outcome Measures (last 72 hours)     SLP Outcome Measures     Row Name 05/10/22 1400             SLP Outcome Measures    Outcome Measure Used? Adult NOMS  -CP              Adult FCM Scores    FCM Chosen Verbal Expression  -CP      Verbal Expression FCM Score 4  -CP            User Key  (r) = Recorded By, (t) = Taken By, (c) = Cosigned By    Initials Name Effective Dates    Caroline Mckeon MS CCC-SLP 06/16/21 -                     Time Calculation:      Time Calculation- SLP     Row Name 05/10/22 1438             Time Calculation- SLP    SLP Start Time 1330  -CP      SLP Received On 05/10/22  -CP              Untimed Charges    SLP Eval/Re-eval  ST Eval Speech and Production w/ Language - 27286  -CP      [id number]-[id number] Eval Speech and Production  w/ Language Minutes 60  -CP              Total Minutes    Untimed Charges Total Minutes 60  -CP       Total Minutes 60  -CP            User Key  (r) = Recorded By, (t) = Taken By, (c) = Cosigned By    Initials Name Provider Type    CP Caroline Bacon MS CCC-SLP Speech and Language Pathologist                Therapy Charges for Today     Code Description Service Date Service Provider Modifiers Qty    [id number] HC ST EVAL SPEECH AND PROD W LANG  4 5/10/2022 Caroline Bacon MS CCC-SLP GN 1          ADULT NOMS (last 72 hours)     Adult NOMS     Row Name 05/10/22 1400                   Adult FCM Scores    FCM Chosen Verbal Expression  -CP        Verbal Expression FCM Score 4  -CP              User Key  (r) = Recorded By, (t) = Taken By, (c) = Cosigned By    Initials Name Effective Dates    CP Caroline Bacon MS CCC-SLP 06/16/21 -                        MS RICHARD Galvan  5/10/2022

## 2022-05-10 NOTE — THERAPY TREATMENT NOTE
Patient Name: Zaina Martinez  : 1965    MRN: [id number]                              Today's Date: 5/10/2022       Admit Date: 2022    Visit Dx:     ICD-10-CM ICD-9-CM   1. Acute metabolic encephalopathy  G93.41 348.31   2. ESRD (end stage renal disease) on dialysis (formerly [facility])  N18.6 585.6    Z99.2 V45.11   3. Acute hypoxemic respiratory failure (formerly [facility])  J96.01 518.81   4. Hyperkalemia  E87.5 276.7   5. ESRD (end stage renal disease) (formerly [facility])  N18.6 585.6     Patient Active Problem List   Diagnosis   • Renal insufficiency   • Hypertensive disorder   • Hypothyroidism   • Acute DM type 2 causing complication (formerly [facility])   • Rheumatoid arthritis (formerly [facility])   • Angioedema   • Esophageal dysmotility   • Anemia   • Medically noncompliant   • Myocardial infarction due to demand ischemia (formerly [facility])   • Enteritis   • PRES (posterior reversible encephalopathy syndrome)   • Urine retention   • Klebsiella infection   • Superficial thrombophlebitis   • Generalized weakness   • ESRD (end stage renal disease) (formerly [facility])   • CAD (coronary artery disease)   • Abnormal urinalysis   • Acute on chronic diastolic CHF (congestive heart failure) (formerly [facility])   • Pyelonephritis   • Calculus of gallbladder with acute on chronic cholecystitis without obstruction   • Pleural effusion on right   • Anemia due to chronic kidney disease, on chronic dialysis (formerly [facility])   • Abnormal findings on diagnostic imaging of other specified body structures   • Acute upper respiratory infection   • Agitation   • Alkaline phosphatase raised   • Casts present in urine   • Cellulitis of toe   • Hip pain   • Community acquired pneumonia   • Depressive disorder   • Diarrhea of presumed infectious origin   • Difficult or painful urination   • Disease due to severe acute respiratory syndrome coronavirus 2 (SARS-CoV-2)   • Dyspnea   • Encounter for follow-up examination after completed treatment for conditions other than malignant neoplasm   • H/O: hypothyroidism   • Hyperlipidemia   •  Hypomagnesemia   • Intractable vomiting with nausea   • Leukocytosis   • Luetscher's syndrome   • Need for influenza vaccination   • Restless legs   • Noncompliance with treatment   • Shoulder pain   • Urinary tract infectious disease   • Metabolic encephalopathy   • Abnormal findings on diagnostic imaging of abdomen   • Status post cholecystectomy   • Hyponatremia   • Leukocytosis   • Acute metabolic encephalopathy   • Encephalopathy, toxic   • Acute CVA (cerebrovascular accident) (HCC)   • Intracranial hemorrhage (HCC)     Past Medical History:   Diagnosis Date   • Acute CVA (cerebrovascular accident) (HCC) 5/1/2022   • Acute on chronic diastolic CHF (congestive heart failure) (HCC)    • CAD (coronary artery disease) 12/20/2021   • Diabetes (HCC)    • Disease of thyroid gland    • GERD (gastroesophageal reflux disease)    • Hyperlipidemia 11/30/2018   • Hypertension    • Rheumatoid arthritis (HCC)      Past Surgical History:   Procedure Laterality Date   • CHOLECYSTECTOMY WITH INTRAOPERATIVE CHOLANGIOGRAM N/A 1/10/2022    Procedure: Laparoscopic cholecystectomy with intraoperative cholangiogram;  Surgeon: Ramana Raygoza MD;  Location: [facility];  Service: General;  Laterality: N/A;   • EYE SURGERY     • HYSTERECTOMY     • INSERTION HEMODIALYSIS CATHETER N/A 12/6/2021    Procedure: HEMODIALYSIS CATHETER INSERTION;  Surgeon: Keli Salazar MD;  Location: [facility] 18/19;  Service: Vascular;  Laterality: N/A;   • INSERTION HEMODIALYSIS CATHETER N/A 5/3/2022    Procedure: TUNNELED CATHETER PLACEMENT;  Surgeon: Keli Salazar MD;  Location: [facility];  Service: Vascular;  Laterality: N/A;      General Information     Row Name 05/10/22 1116          OT Time and Intention    Document Type therapy note (daily note)  -RB     Mode of Treatment individual therapy;occupational therapy  -RB     Row Name 05/10/22 1116          General Information    Existing Precautions/Restrictions fall  -RB      Row Name 05/10/22 1116          Cognition    Orientation Status (Cognition) oriented x 3  -     Row Name 05/10/22 1116          Safety Issues, Functional Mobility    Safety Issues Affecting Function (Mobility) ability to follow commands;awareness of need for assistance;insight into deficits/self-awareness;safety precautions follow-through/compliance;sequencing abilities;judgment;problem-solving;safety precaution awareness  -           User Key  (r) = Recorded By, (t) = Taken By, (c) = Cosigned By    Initials Name Provider Type    RB Jacquelyn Guthrie OT Occupational Therapist                 Mobility/ADL's     Row Name 05/10/22 1117          Bed Mobility    Bed Mobility supine-sit  -     Sit-Supine [place] (Bed Mobility) minimum assist (75% patient effort);verbal cues;nonverbal cues (demo/gesture);1 person assist  -     Assistive Device (Bed Mobility) bed rails  -     Row Name 05/10/22 1117          Transfers    Transfers sit-stand transfer;stand-sit transfer;toilet transfer;bed-chair transfer  -     Bed-Chair [place] (Transfers) minimum assist (75% patient effort);verbal cues;1 person assist  -     Assistive Device (Bed-Chair Transfers) --  [facility]     Sit-Stand [place] (Transfers) contact guard;minimum assist (75% patient effort);verbal cues;1 person assist  -RB     Stand-Sit [place] (Transfers) contact guard;minimum assist (75% patient effort);verbal cues;1 person assist  -RB     [place] Level (Toilet Transfer) minimum assist (75% patient effort);verbal cues;nonverbal cues (demo/gesture);1 person assist  -     Assistive Device (Toilet Transfer) --  a  -     Row Name 05/10/22 1117          Sit-Stand Transfer    Assistive Device (Sit-Stand Transfers) --  a  -     Row Name 05/10/22 1117          Stand-Sit Transfer    Assistive Device (Stand-Sit Transfers) --  [facility] Name 05/10/22 1117          Toilet Transfer    Type (Toilet Transfer)  stand-sit;sit-stand  -RB     Row Name 05/10/22 1117          Functional Mobility    Functional Mobility- Ind. Level not tested;unable to perform  -RB     Row Name 05/10/22 1117          Activities of Daily Living    BADL Assessment/Intervention lower body dressing;grooming;toileting  -RB     Row Name 05/10/22 1117          Lower Body Dressing Assessment/Training    [place] Level (Lower Body Dressing) lower body dressing skills;dependent (less than 25% patient effort)  -RB     Position (Lower Body Dressing) edge of bed sitting  -RB     Row Name 05/10/22 1117          Grooming Assessment/Training    [place] Level (Grooming) grooming skills;minimum assist (75% patient effort);verbal cues;nonverbal cues (demo/gesture)  -RB     Position (Grooming) sink side;supported standing  -RB     Row Name 05/10/22 1117          Toileting Assessment/Training    [place] Level (Toileting) toileting skills;dependent (less than 25% patient effort)  -RB     Position (Toileting) supported sitting;supported standing  -RB     Comment, (Toileting) large loose bowel movement in brief  -RB           User Key  (r) = Recorded By, (t) = Taken By, (c) = Cosigned By    Initials Name Provider Type    Jacquelyn Strong OT Occupational Therapist               Obj/Interventions     Row Name 05/10/22 1119          Balance    Static Sitting Balance contact guard  -RB     Dynamic Sitting Balance contact guard  -RB     Position, Sitting Balance sitting edge of bed;sitting in chair  -RB     Static Standing Balance contact guard;non-verbal cues (demo/gesture);verbal cues  -RB     Dynamic Standing Balance minimal assist;verbal cues;non-verbal cues (demo/gesture)  -RB     Position/Device Used, Standing Balance --  hha  -RB     Balance Interventions occupation based/functional task  -RB           User Key  (r) = Recorded By, (t) = Taken By, (c) = Cosigned By    Initials Name Provider Type    Jacquelyn Strong OT Occupational Therapist                Goals/Plan    No documentation.                Clinical Impression     Row Name 05/10/22 1119          Pain Assessment    Pretreatment Pain Rating 4/10  -RB     Posttreatment Pain Rating 4/10  -RB     Pain Location - head  -RB     Row Name 05/10/22 1119          Plan of Care Review    Plan of Care Reviewed With patient  -RB     Progress no change  -RB     Row Name 05/10/22 1119          Therapy Assessment/Plan (OT)    Rehab Potential (OT) good, to achieve stated therapy goals  -RB     Criteria for Skilled Therapeutic Interventions Met (OT) yes;skilled treatment is necessary  -RB     Therapy Frequency (OT) 5 times/wk  -RB     Row Name 05/10/22 1119          Therapy Plan Review/Discharge Plan (OT)    Anticipated Discharge Disposition (OT) inpatient rehabilitation facility  -RB     Row Name 05/10/22 1119          Vital Signs    O2 Delivery Pre Treatment room air  -RB     O2 Delivery Intra Treatment room air  -RB     O2 Delivery Post Treatment room air  -RB     Pre Patient Position Supine  -RB     Intra Patient Position Standing  -RB     Post Patient Position Sitting  -RB     Row Name 05/10/22 1119          Positioning and Restraints    Pre-Treatment Position in bed  -RB     Post Treatment Position chair  -RB     In Chair notified nsg;reclined;call light within reach;sitting;encouraged to call for assist;exit alarm on;legs elevated  -RB           User Key  (r) = Recorded By, (t) = Taken By, (c) = Cosigned By    Initials Name Provider Type    RB Jacquelyn Guthrie OT Occupational Therapist               Outcome Measures    No documentation.                 Occupational Therapy Education                 Title: PT OT SLP Therapies (In Progress)     Topic: Occupational Therapy (Not Started)     Point: ADL training (Not Started)     Description:   Instruct learner(s) on proper safety adaptation and remediation techniques during self care or transfers.   Instruct in proper use of assistive devices.               Learner Progress:  Not documented in this visit.          Point: Home exercise program (Not Started)     Description:   Instruct learner(s) on appropriate technique for monitoring, assisting and/or progressing therapeutic exercises/activities.              Learner Progress:  Not documented in this visit.          Point: Precautions (Not Started)     Description:   Instruct learner(s) on prescribed precautions during self-care and functional transfers.              Learner Progress:  Not documented in this visit.          Point: Body mechanics (Not Started)     Description:   Instruct learner(s) on proper positioning and spine alignment during self-care, functional mobility activities and/or exercises.              Learner Progress:  Not documented in this visit.                          OT Recommendation and Plan  Therapy Frequency (OT): 5 times/wk  Plan of Care Review  Plan of Care Reviewed With: patient  Progress: no change     Time Calculation:    Time Calculation- OT     Row Name 05/10/22 1116             Time Calculation- OT    OT Start Time 1039  -RB      OT Stop Time 1111  -RB      OT Time Calculation (min) 32 min  -RB      Total Timed Code Minutes- OT 32 minute(s)  -RB      OT Received On 05/10/22  -RB      OT - Next Appointment 05/11/22  -RB              Timed Charges    39702 - OT Self Care/Mgmt Minutes 32  -RB              Total Minutes    Timed Charges Total Minutes 32  -RB       Total Minutes 32  -RB            User Key  (r) = Recorded By, (t) = Taken By, (c) = Cosigned By    Initials Name Provider Type    RB Jacquelyn Guthrie OT Occupational Therapist              Therapy Charges for Today     Code Description Service Date Service Provider Modifiers Qty    12090040680 HC OT SELF CARE/MGMT/TRAIN EA 15 MIN 5/10/2022 Jacquelyn Guthrie OT GO 2               Jacquelyn Guthrie OT  5/10/2022

## 2022-05-10 NOTE — PROGRESS NOTES
"    Patient Name: Zaina Martinez  :1965  56 y.o.      Patient Care Team:  Karson Oreilly MD as PCP - General (Family Medicine)    Chief Complaint: MRSA hemodialysis catheter infection    Interval History: More awake today, still complains of a headache but improved       Objective   Vital Signs  Temp:  [97.4 °F (36.3 °C)-98.6 °F (37 °C)] 97.6 °F (36.4 °C)  Heart Rate:  [82-90] 90  Resp:  [16] 16  BP: (172-191)/(80-92) 172/80    Intake/Output Summary (Last 24 hours) at 5/10/2022 0808  Last data filed at 2022 1700  Gross per 24 hour   Intake --   Output 3000 ml   Net -3000 ml     Flowsheet Rows    Flowsheet Row First Filed Value   Admission Height 157.5 cm (62\") Documented at 2022   Admission Weight 54.8 kg (120 lb 13 oz) Documented at 2022 0427          Physical Exam:   General Appearance:   Lethargic, chronically ill-appearing   Lungs:     Clear to auscultation.  Normal respiratory effort and rate.      Heart:    Regular rhythm and normal rate, normal S1 and S2, no murmurs, gallops or rubs.     Chest Wall:    No abnormalities observed   Abdomen:     Soft, nontender, positive bowel sounds.     Extremities:   no cyanosis, clubbing or edema.  No marked joint deformities.        Results Review:    Results from last 7 days   Lab Units 22  0621   SODIUM mmol/L 133*   POTASSIUM mmol/L 4.3   CHLORIDE mmol/L 97*   CO2 mmol/L 24.5   BUN mg/dL 27*   CREATININE mg/dL 3.40*   GLUCOSE mg/dL 233*   CALCIUM mg/dL 7.6*     Results from last 7 days   Lab Units 22  0557   CK TOTAL U/L 97     Results from last 7 days   Lab Units 05/10/22  0547   WBC 10*3/mm3 8.42   HEMOGLOBIN g/dL 9.1*   HEMATOCRIT % 28.5*   PLATELETS 10*3/mm3 271                           Medication Review:   amLODIPine, 10 mg, Oral, Q24H  carvedilol, 3.125 mg, Oral, BID With Meals  heparin (porcine), 3,000 Units, Intracatheter, Once  hydrALAZINE, 100 mg, Oral, Q8H  insulin glargine, 5 Units, Subcutaneous, Nightly  insulin " lispro, 0-14 Units, Subcutaneous, Q6H  levothyroxine, 125 mcg, Oral, Q AM  pantoprazole, 40 mg, Intravenous, Q AM  rOPINIRole, 0.75 mg, Oral, Nightly  sertraline, 100 mg, Oral, Daily  sodium chloride, 10 mL, Intravenous, Q12H  tamsulosin, 0.4 mg, Oral, Daily  Vancomycin Pharmacy Intermittent/Pulse Dosing, , Does not apply, Daily         Pharmacy to dose vancomycin,         Assessment/Plan   Active Hospital Problems    Diagnosis  POA   • **Encephalopathy, toxic [G92.9]  Unknown   • Acute CVA (cerebrovascular accident) ([facility]) [I63.9]  Unknown   • Intracranial hemorrhage ([facility]) [I62.9]  Unknown   • Acute metabolic encephalopathy [G93.41]  Yes   • Leukocytosis [D72.829]  Yes   • Acute on chronic diastolic CHF (congestive heart failure) ([facility]) [I50.33]  Yes   • CAD (coronary artery disease) [I25.10]  Yes   • ESRD (end stage renal disease) ([facility]) [N18.6]  Yes   • Hypertensive disorder [I10]  Yes   • Acute DM type 2 causing complication ([facility]) [E11.8]  Yes      Resolved Hospital Problems   No resolved problems to display.     1.  MRSA hemodialysis catheter infection   2.  Left frontal intracerebral hemorrhage status post lytic therapy  3.  End-stage renal disease, on hemodialysis  4.  Altered mental status, multifactorial  5.  Acute on chronic diastolic CHF  6.  Pulmonary hypertension  7.  Reported history of coronary artery disease  8.  COVID pneumonia in April 2022  9.  Rheumatoid arthritis    More alert this morning and headache is improved but still bothering her.  We will plan on a ANJEL with anesthesia in the morning assuming that she continues to improve and is appropriate for the procedure by tomorrow.    Ghulam Shook III, MD  [facility]  05/10/22  08:08 EDT

## 2022-05-10 NOTE — PROGRESS NOTES
"DOS: 5/10/2022  NAME: Zaina Martinez   : 1965  PCP: Karson Oreilly MD  Chief Complaint   Patient presents with   • Altered Mental Status     PT WITH AMS, LAST SEEN NORMAL APPROX. 0800 TODAY; PT NOT TALKING, LEFT  WEAKNESS; PT IS ON DIALYSIS BUT HAS MISSED HER LAST APPOINTMENT; PT WEARING FACE MASK     Stroke    Subjective: Headache improved, currently 5/10, frontal/temporal. Intermittent diarrhea yesterday and overnight. Denies focal weakness/numbness.     Objective:  Vital signs: /84 (BP Location: Right arm, Patient Position: Sitting)   Pulse 74   Temp 97.4 °F (36.3 °C) (Oral)   Resp 16   Ht 157.5 cm (62.01\")   Wt 52.9 kg (116 lb 9.6 oz)   SpO2 100%   BMI 21.32 kg/m²       General appearance: Well developed, well nourished, and cooperative.   HEENT: Normocephalic.   Neck: Supple  Cardiac: Regular rate and rhythm. No murmurs.   Peripheral Vasculature: Radial pulses are equal and symmetric.  Chest Exam: Clear to auscultation bilaterally, no wheezes, no rhonchi.  Extremities: Normal, no edema.   Skin: No rashes or birthmarks.      Higher integrative function: Alert.  Oriented x3 though mildly delayed in response. Speech fluent.  No neglect.  Cranial nerves: Visual fields intact, extraocular movements intact, facial sensation symmetric, face symmetric, fold symmetric shoulder shrug, tongue midline.  Motor: Normal strength in all extremities.  No fasciculations, rigidity, spasticity or abnormal movements.   Sensation: Intact to light touch in all extremities.  Station and gait: Deferred.  Coordination: Finger to nose test showed no dysmetria.   The patient was reexamined, changes noted.     Scheduled Meds:amLODIPine, 10 mg, Oral, Q24H  carvedilol, 3.125 mg, Oral, BID With Meals  heparin (porcine), 3,000 Units, Intracatheter, Once  hydrALAZINE, 100 mg, Oral, Q8H  insulin glargine, 5 Units, Subcutaneous, Nightly  insulin lispro, 0-14 Units, Subcutaneous, Q6H  levothyroxine, 125 mcg, Oral, Q " AM  pantoprazole, 40 mg, Intravenous, Q AM  potassium & sodium phosphates, 1 packet, Oral, Q6H  rOPINIRole, 0.75 mg, Oral, Nightly  sertraline, 100 mg, Oral, Daily  sodium chloride, 10 mL, Intravenous, Q12H  tamsulosin, 0.4 mg, Oral, Daily  Vancomycin Pharmacy Intermittent/Pulse Dosing, , Does not apply, Daily      Continuous Infusions:Pharmacy to dose vancomycin,       PRN Meds:.•  acetaminophen  •  acetaminophen  •  dextrose  •  dextrose  •  diphenoxylate-atropine  •  glucagon (human recombinant)  •  heparin (porcine)  •  hydrALAZINE  •  melatonin  •  nitroglycerin  •  ondansetron **OR** ondansetron  •  Pharmacy to dose vancomycin  •  [COMPLETED] Insert peripheral IV **AND** sodium chloride  •  sodium chloride    Laboratory results:  Lab Results   Component Value Date    GLUCOSE 237 (H) 05/10/2022    CALCIUM 8.2 (L) 05/10/2022     05/10/2022    K 3.8 05/10/2022    CO2 23.8 05/10/2022     05/10/2022    BUN 10 05/10/2022    CREATININE 2.12 (H) 05/10/2022    EGFRIFAFRI  01/13/2022      Comment:      <15 Indicative of kidney failure.    EGFRIFNONA 14 (L) 02/28/2022    BCR 4.7 (L) 05/10/2022    ANIONGAP 10.2 05/10/2022     Lab Results   Component Value Date    WBC 8.42 05/10/2022    HGB 9.1 (L) 05/10/2022    HCT 28.5 (L) 05/10/2022    MCV 84.3 05/10/2022     05/10/2022     Lab Results   Component Value Date    CHOL 145 04/30/2022     Lab Results   Component Value Date    HDL 44 04/30/2022     Lab Results   Component Value Date    LDL 80 04/30/2022     Lab Results   Component Value Date    TRIG 119 04/30/2022          Review and interpretation of imaging:  CT Head Without Contrast    Addendum Date: 5/9/2022    ADDENDUM: 05 09 22 06:49 Call Doctor Regarding Stroke, called Nurse Tesha on 05 09 06:49 (-04:00)     Result Date: 5/9/2022  CR Patient: ÁNGELAELY ANTHONY  Time Out: 05:44 Exam(s): CT HEAD Without Contrast EXAM:   CT Head Without Intravenous Contrast CLINICAL HISTORY:    Reason for exam: headache.  TECHNIQUE:   Axial computed tomography images of the head brain without intravenous contrast.  CTDI is 54.8 mGy and DLP is 974.4 mGy-cm.  This CT exam was performed according to the principle of ALARA (As Low As Reasonably Achievable) by using one or more of the following dose reduction techniques: automated exposure control, adjustment of the mA and or kV according to patient size, and or use of iterative reconstruction technique. Comparison:  5 1 2022 FINDINGS:   Brain:  Unchanged parenchymal hematoma in the superior left frontal lobe measuring 1.9 x 1.4 x 2.7 cm.  Mild surrounding edema.  No new hemorrhage..   Ventricles:  Unremarkable.  No ventriculomegaly.   Bones joints:  Unremarkable.  No acute fracture.   Soft tissues:  Unremarkable.   Sinuses:  Unremarkable as visualized.  No acute sinusitis.   Mastoid air cells:  Unremarkable as visualized.  No mastoid effusion. IMPRESSION:     Unchanged parenchymal hematoma in the superior left frontal lobe with mild surrounding edema.  No new hemorrhage.    Impression: Communications:  Call Doctor Stroke Electronically signed by John Florentino M.D. on 05-09-22 at 0544      Impression:  56-year-old female with HTN, HLD, diabetes, thyroid disease, rheumatoid arthritis, depression, ESRD, CHF, and CAD who was admitted 4/27 with complaints of confusion and poor oral intake.  The confusion had started several days prior to admission and she had also reported missing 2 dialysis sections.  She was found to have an infected dialysis line which was removed and sent for culture which did reveal MRSA so she was initiated on IV antibiotics.  On 4/29 she developed symptoms of acute right MCA syndrome.  She was taken for team D imaging, CT/CTA/CTP was negative for any retrievable LVO or hemorrhage but did show some diminished perfusion in the right MCA inferior division.  She received IV TNK and subsequently developed a left frontal intracerebral hemorrhage.  The TNK was reversed with  cryo.     Additional work-up:  MRI brain without contrast 4/28: No acute findings.  Old left frontal subcortical infarct.  Resolved left cerebral parenchymal abnormality seen on previous MRI from 12/2/2021 which was probably related to PRES.  MRI brain without contrast 4/29: Focal area of acute subacute intracerebral hemorrhage in the left frontal lobe with mild vasogenic edema.  No acute hemic stroke.  2D echo 4/29: EF 66.8%, moderate to severely dilated left atrial cavity, saline test results are negative for shunting.  Aortic valve sclerosis.  Severe mitral annular calcification with mild mitral valve regurgitation.  Mild tricuspid valve regurgitation.  Moderate pulmonary hypertension.  EEG 5/2: Normal in the awake state only.  No potentially epileptogenic activity, seizure activity, or focal slowing.  Excessive electrode artifact noted.  Most recent CT head 5/9: Stable/unchanged parenchymal hematoma in the left frontal lobe with mild surrounding edema, no new hemorrhage.  Labs: Hemoglobin A1c 5.80%, UDS positive for benzos and oxycodone alcohol level not elevated, UA with trace bacteria, 31-50 WBCs, small amount of blood, 1+ leukoesterase, nitrites negative, culture positive for less than 10,000 MRSA.  CK 97.  .  B12 735.     Diagnoses:  Right MCA syndrome status post IV TNKase  Subcortical left frontal ICH  Encephalopathy, suspect toxic/metabolic  Elevated TSH  ESRD on hemodialysis   Dialysis catheter infection s/p removal  Polypharmacy  The above impression statement reviewed and changes noted.    Plan:  ANJEL planned for tomorrow per cardiology  Repeat CT head with out contrast for any change in neuro status  Continue to avoid antiplatelet/anticoagulation for now  Repeat MRI brain in 3 mo  Neurochecks  BP control, SBP goal less than 160  Teds/SCDs  PT/OT/ST  D/W Dr Lester today. Will follow up after ANJEL. Please call with questions/concerns.

## 2022-05-10 NOTE — PROGRESS NOTES
"[facility] Clinical Pharmacy Services: Vancomycin Monitoring Note    Zaina Martinez is a 56 y.o. female who is on day 12 of pharmacy to dose vancomycin for SSTI per Dr. Soriano's request. Dr. Canas following for sepsis and HD catheter-related infection w/MRSA.    Previous Vancomycin Dose: 500 mg IV once on 5/7 at 2054    Results from last 7 days   Lab Units 05/09/22  0621 05/07/22  0423 05/04/22  0320   VANCOMYCIN RM mcg/mL 14.10 15.90 21.70     Vitals/Labs  Ht: 157.5 cm (62.01\"); Wt: 52.9 kg (116 lb 9.6 oz)   Temp Readings from Last 1 Encounters:   05/10/22 97.4 °F (36.3 °C) (Oral)     Estimated Creatinine Clearance: 24.7 mL/min (A) (by C-G formula based on SCr of 2.12 mg/dL (H)).   Dialysis MWF    Results from last 7 days   Lab Units 05/10/22  0547 05/09/22  0621 05/08/22  0510   CREATININE mg/dL 2.12* 3.40* 2.02*   WBC 10*3/mm3 8.42 8.41 8.23     Assessment/Plan    Current Vancomycin Dose: 500 mg IV once in the evening on HD days.  Currently MWF.  Next Level Date and Time: Pre-HD Vanc Random with AM labs on Monday 5/16  We will continue to monitor patient changes and renal function     Thank you for involving pharmacy in this patient's care. Please contact pharmacy with any questions or concerns.       Isa Kern, [facility]  Clinical Pharmacist      "

## 2022-05-10 NOTE — PROGRESS NOTES
Records reviewed and spoke with patient who is having difficulty staying awake/attending.  With her permission spoke with spouse by phone re: acute vs. subacute rehab and amount of assistance available to help patient in home setting after a rehab stay.  Will discuss findings with Dr. Florentino and continue to follow.  Thank you--Rand Baires,  Rehab Admission Coordinator

## 2022-05-10 NOTE — PLAN OF CARE
"Pt participated in OT this morning. The pt has delayed processing and at times requires 30-60 seconds to acknowledge questions asked of her. She is oriented to herself, location and the year. The pt has impaired insight as she was requesting to d/c home today - per notes she has plans to d/c to acute rehab. She reports \"I am depressed\". Large loose liquid bowel movement present in her brief. Min A for bed mobility, CGA/Min A to stand and mobilize into the bathroom for dependent hygiene. Unsteady with descent onto the commode. Min A for sinkside ADL's - she required cues for sequencing steps and initiation of tasks. Little verbal communication with the therapist. She remained in the bedside chair following the session in prep for lunch. She would benefit from continued OT services with a d/c to acute rehab.     Patient was not wearing a face mask during this therapy encounter. Therapist used appropriate personal protective equipment including mask, gown and gloves. Mask used was standard procedure mask. Appropriate PPE was worn during the entire therapy session. Hand hygiene was completed before and after therapy session. Patient is not in enhanced droplet precautions.        "

## 2022-05-10 NOTE — PLAN OF CARE
Goal Outcome Evaluation:  Plan of Care Reviewed With: patient           Outcome Evaluation: Speech/language eval completed. The patient appears to present with mild-moderate receptive and moderate expressive language deficits. Feel performance impacted by lethargy this date, and it is unclear how much her issues are exacerbated by encephalopathy from infection. Ongoing re-assessment is advised. Pt will need continued SLP service in the hospital and at next level of care.

## 2022-05-10 NOTE — PROGRESS NOTES
Dedicated to Hospital Care    [phone]   LOS: 13 days     Name: Zaina Martinez  Age/Sex: 56 y.o. female  :  1965        PCP: Karson Oreilly MD  Chief Complaint   Patient presents with   • Altered Mental Status     PT WITH AMS, LAST SEEN NORMAL APPROX. 0800 TODAY; PT NOT TALKING, LEFT  WEAKNESS; PT IS ON DIALYSIS BUT HAS MISSED HER LAST APPOINTMENT; PT WEARING FACE MASK      Subjective   Her headache is better today.  She is not resting well at night.  Feels tired today but remains alert and oriented    General: No Fever or Chills, Cardiac: No Chest Pain or Palpitations, Resp: No Cough or SOA, GI: No Nausea, Vomiting, or Diarrhea and Other: No bleeding    amLODIPine, 10 mg, Oral, Q24H  carvedilol, 3.125 mg, Oral, BID With Meals  heparin (porcine), 3,000 Units, Intracatheter, Once  hydrALAZINE, 100 mg, Oral, Q8H  insulin glargine, 5 Units, Subcutaneous, Nightly  insulin lispro, 0-14 Units, Subcutaneous, Q6H  levothyroxine, 125 mcg, Oral, Q AM  pantoprazole, 40 mg, Intravenous, Q AM  rOPINIRole, 0.75 mg, Oral, Nightly  sertraline, 100 mg, Oral, Daily  sodium chloride, 10 mL, Intravenous, Q12H  tamsulosin, 0.4 mg, Oral, Daily  Vancomycin Pharmacy Intermittent/Pulse Dosing, , Does not apply, Daily      Pharmacy to dose vancomycin,         Objective   Vital Signs  Temp:  [97.4 °F (36.3 °C)-98.6 °F (37 °C)] 97.6 °F (36.4 °C)  Heart Rate:  [82-90] 90  Resp:  [16] 16  BP: (172-191)/(80-92) 172/80  Body mass index is 21.32 kg/m².    Intake/Output Summary (Last 24 hours) at 5/10/2022 0851  Last data filed at 2022 1700  Gross per 24 hour   Intake --   Output 3000 ml   Net -3000 ml       Physical Exam  Vitals and nursing note reviewed.   Constitutional:       Comments: Chronically ill-appearing   Cardiovascular:      Rate and Rhythm: Normal rate and regular rhythm.   Pulmonary:      Effort: No respiratory distress.      Breath sounds: Normal breath sounds.   Abdominal:      General: Bowel sounds are  normal. There is no distension.      Palpations: Abdomen is soft.   Skin:     General: Skin is warm and dry.   Neurological:      General: No focal deficit present.      Mental Status: She is alert and oriented to person, place, and time.           Results Review:       I reviewed the patient's new clinical results.  Results from last 7 days   Lab Units 05/10/22  0547 05/09/22  0621 05/08/22  0510 05/07/22  0423 05/06/22 0622 05/05/22  0557 05/04/22  0320   WBC 10*3/mm3 8.42 8.41 8.23 8.50 7.95 11.38* 17.72*   HEMOGLOBIN g/dL 9.1* 9.4* 10.1* 9.8* 9.5* 7.9* 9.6*   PLATELETS 10*3/mm3 271 282 335 366 313 245 214     Results from last 7 days   Lab Units 05/10/22  0547 05/09/22  0621 05/08/22  0510 05/07/22 0423 05/06/22 0622 05/05/22  0557 05/04/22  0320   SODIUM mmol/L 136 133* 136 134* 135* 131* 132*   POTASSIUM mmol/L 3.8 4.3 3.9 4.0 3.9 3.8 3.9   CHLORIDE mmol/L 102 97* 99 98 99 95* 99   CO2 mmol/L 23.8 24.5 26.3 24.0 23.7 25.0 19.0*   BUN mg/dL 10 27* 12 27* 17 32* 19   CREATININE mg/dL 2.12* 3.40* 2.02* 3.40* 2.66* 3.85* 2.93*   CALCIUM mg/dL 8.2* 7.6* 8.1* 7.9* 8.0* 7.8* 7.9*   PHOSPHORUS mg/dL 1.7* 3.0 1.4* 2.5 2.2* 3.3 2.6   Estimated Creatinine Clearance: 24.7 mL/min (A) (by C-G formula based on SCr of 2.12 mg/dL (H)).  Lab Results   Component Value Date    HGBA1C 5.80 (H) 04/28/2022    HGBA1C 5.80 (H) 02/24/2022    HGBA1C 6.90 (H) 01/10/2022     Glucose   Date/Time Value Ref Range Status   05/10/2022 0554 243 (H) 70 - 130 mg/dL Final     Comment:     Meter: [id number] : [id number] Joshua Tesha QUENTIN   05/09/2022 2141 246 (H) 70 - 130 mg/dL Final     Comment:     Meter: [id number] : [id number] Aidan SAAVEDRA   05/09/2022 1859 113 70 - 130 mg/dL Final     Comment:     Meter: [id number] : [id number] Chon [place] CNA   05/09/2022 1111 131 (H) 70 - 130 mg/dL Final     Comment:     Meter: [id number] : [id number] Jose Chavis NA   05/09/2022 0620 242 (H) 70 - 130 mg/dL Final     Comment:      Meter: [id number] : [id number] Jay Camejo NA   05/08/2022 2201 356 (H) 70 - 130 mg/dL Final     Comment:     Meter: [id number] : [id number] Ruthie Fulton NA   05/08/2022 1652 215 (H) 70 - 130 mg/dL Final     Comment:     Meter: [id number] : [id number] Rob Banegas NA   05/08/2022 1107 262 (H) 70 - 130 mg/dL Final     Comment:     Meter: [id number] : [id number] Dilcia SAAVEDRA         Assessment/Plan   Active Hospital Problems    Diagnosis  POA   • **Encephalopathy, toxic [G92.9]  Unknown   • Acute CVA (cerebrovascular accident) ([facility]) [I63.9]  Unknown   • Intracranial hemorrhage ([facility]) [I62.9]  Unknown   • Acute metabolic encephalopathy [G93.41]  Yes   • Leukocytosis [D72.829]  Yes   • Acute on chronic diastolic CHF (congestive heart failure) ([facility]) [I50.33]  Yes   • CAD (coronary artery disease) [I25.10]  Yes   • ESRD (end stage renal disease) ([facility]) [N18.6]  Yes   • Hypertensive disorder [I10]  Yes   • Acute DM type 2 causing complication ([facility]) [E11.8]  Yes      Resolved Hospital Problems   No resolved problems to display.       PLAN  This is a 56-year-old female with a history end-stage renal disease who presented to the hospital after missing some dialysis and unfortunately has had a rather complicated hospitalization with bacteremia and infection in her tunneled dialysis catheter as well as acute stroke during the hospitalization requiring tPA.  -Headache has improved she remains alert and oriented  -Cardiology has been consulted for ANJEL  -Neurology was called and they are planning a migraine cocktail today we will see how she responds to this  -Appreciate infectious diseases input regarding the antibiotics.  -Nephrology following for dialysis  -Blood sugars are running a little high now that she is more awake and eating better.  We will reinitiate half her basal insulin from home hesitant to give her entire home dose given how brittle she is.  Blood sugar still running high continue adding  scheduled mealtime insulin tomorrow.  -Blood pressures been running high today.  Medication adjustments noted  -Patient is a full code    Disposition  To be determined      Osmar King MD  [facility]  05/10/22  08:51 EDT

## 2022-05-10 NOTE — PROGRESS NOTES
[facility] Progress Note      Patient Name: Zaina Martinez  : 1965  MRN: [id number]  Primary Care Physician:  Karson Oreilly MD  Date of admission: 2022    Subjective     Interval History:   Follow up ESRD  Her headache has resolved, denies any chest pain or shortness of breath, no orthopnea or PND, no nausea or vomiting, no abdominal pain.      Objective     Vitals:   Temp:  [97.4 °F (36.3 °C)-98.6 °F (37 °C)] 97.6 °F (36.4 °C)  Heart Rate:  [82-90] 90  Resp:  [16] 16  BP: (172-191)/(80-92) 172/80    Intake/Output Summary (Last 24 hours) at 5/10/2022 1051  Last data filed at 2022 1700  Gross per 24 hour   Intake --   Output 3000 ml   Net -3000 ml       Physical Exam:   General: Awake, chronically ill, blunted but still oriented  Skin:dry. No rash  HEENT: Nonicteric sclerae, oral mucosa is normal  Neck:  RIJ TDC, no JVD  Lungs:  clear to auscultation. Unlabored.   Heart: RRR no s3 or rub  Abdomen: Normoactive bowel, soft, nontender, ND   Extremities: no edema.    Neuro: Normal speech, moving all extremities    Scheduled Meds:     amLODIPine, 10 mg, Oral, Q24H  carvedilol, 3.125 mg, Oral, BID With Meals  heparin (porcine), 3,000 Units, Intracatheter, Once  hydrALAZINE, 100 mg, Oral, Q8H  insulin glargine, 5 Units, Subcutaneous, Nightly  insulin lispro, 0-14 Units, Subcutaneous, Q6H  levothyroxine, 125 mcg, Oral, Q AM  pantoprazole, 40 mg, Intravenous, Q AM  rOPINIRole, 0.75 mg, Oral, Nightly  sertraline, 100 mg, Oral, Daily  sodium chloride, 10 mL, Intravenous, Q12H  tamsulosin, 0.4 mg, Oral, Daily  Vancomycin Pharmacy Intermittent/Pulse Dosing, , Does not apply, Daily      IV Meds:   Pharmacy to dose vancomycin,         Results Reviewed:   I have personally reviewed the results from the time of this admission to 5/10/2022 10:51 EDT     Results from last 7 days   Lab Units 05/10/22  0547 22  0621 22  0510   SODIUM mmol/L 136 133* 136   POTASSIUM mmol/L 3.8  4.3 3.9   CHLORIDE mmol/L 102 97* 99   CO2 mmol/L 23.8 24.5 26.3   BUN mg/dL 10 27* 12   CREATININE mg/dL 2.12* 3.40* 2.02*   CALCIUM mg/dL 8.2* 7.6* 8.1*   GLUCOSE mg/dL 237* 233* 65       Estimated Creatinine Clearance: 24.7 mL/min (A) (by C-G formula based on SCr of 2.12 mg/dL (H)).    Results from last 7 days   Lab Units 05/10/22  0547 05/09/22  0621 05/08/22  0510   PHOSPHORUS mg/dL 1.7* 3.0 1.4*             Results from last 7 days   Lab Units 05/10/22  0547 05/09/22  0621 05/08/22  0510 05/07/22  0423 05/06/22 0622   WBC 10*3/mm3 8.42 8.41 8.23 8.50 7.95   HEMOGLOBIN g/dL 9.1* 9.4* 10.1* 9.8* 9.5*   PLATELETS 10*3/mm3 271 282 335 366 313             Assessment / Plan     ASSESSMENT:  1.  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   Electrolytes, appears to be euvolemic, electrolyte within acceptable range  2.  Intracerebral bleed and altered mental status.  Mental status improved significantly.   3.  Infected TDC, s/p removal 5/1. Staph aureus .  Treated and followed by ID   4.  DM2    5.  Coronary artery disease  6.  Acute on chronic diastolic dysfunction .  Volume status is improved significantly  7.  Anemia of CKD, on long-acting ANDRAE as an outpatient.  Hemoglobin today 9.1  8.  HTN, not well controlled  9.  Hypophosphatemia, phosphorus 1.7    PLAN:  1.  Hemodialysis tomorrow  2.  Replete phosphorus  3.  Surveillance labs      Lei Covington MD  05/10/22  10:51 EDT    [facility]  [phone]

## 2022-05-10 NOTE — PLAN OF CARE
Goal Outcome Evaluation:  Plan of Care Reviewed With: patient        Progress: improving  Outcome Evaluation: Patient alert and oriented x 4.  Several bowel movements of diarhea throughout the night where stool collected and negative for CDiff.  Lomotil ordered and given per MD order.  Patient kept NPO after midnight incase ANJEL will be performed.  Patient's SBP elevated and Hydralizine IV prn given x 1.  VSS.  WCTM.

## 2022-05-10 NOTE — PROGRESS NOTES
LOS: 13 days     Chief Complaint: Sepsis    Interval History: Patient states she is feeling better this morning.  States her headache is improved.  Denies any fevers or chills.  Denies any nausea, vomiting, diarrhea.    Fever curve within normal limits.  Blood cultures have been no growth.  No leukocytosis.    Vital Signs  Temp:  [97.4 °F (36.3 °C)-98.6 °F (37 °C)] 97.6 °F (36.4 °C)  Heart Rate:  [82-90] 90  Resp:  [16] 16  BP: (172-191)/(80-92) 172/80    Physical Exam:  General: In no acute distress  Cardiovascular: RRR, no LE edema   Respiratory: Clear to ascultation bilaterally  GI: Soft, NT/ND, + bowel sounds bilaterally  Skin: No rashes.  New catheter site clean and intact.    Antibiotics:  Vancomycin dosing per pharmacy     Results Review:    Lab Results   Component Value Date    WBC 8.42 05/10/2022    HGB 9.1 (L) 05/10/2022    HCT 28.5 (L) 05/10/2022    MCV 84.3 05/10/2022     05/10/2022     Lab Results   Component Value Date    GLUCOSE 237 (H) 05/10/2022    BUN 10 05/10/2022    CREATININE 2.12 (H) 05/10/2022    EGFRIFNONA 14 (L) 02/28/2022    EGFRIFAFRI  01/13/2022      Comment:      <15 Indicative of kidney failure.    BCR 4.7 (L) 05/10/2022    CO2 23.8 05/10/2022    CALCIUM 8.2 (L) 05/10/2022    ALBUMIN 2.80 (L) 05/10/2022    AST 30 04/27/2022    ALT 14 04/27/2022       Microbiology:  4/27 urine culture <10K MRSA  4/28 blood cultures NGTD   4/28 catheter tip culture from hemodialysis catheter with MRSA  5/5 blood cultures no growth     Assessment/Plan     #Hemodialysis catheter related infection with MRSA, now removed  #Left frontal ICH  #Acute encephalopathy  #Type 2 diabetes  #ESRD on hemodialysis    Continue intermittent dosed with dialysis IV vancomycin goal -600 for MRSA infection associated with her hemodialysis catheter. Appreciate pharmacy's help with dosing.      ANJEL planned for tomorrow morning.  Tentative plan would be for 4 weeks of IV vancomycin with dialysis however we will  follow-up possible ANJEL as this may alter her duration.    ID will follow.

## 2022-05-11 ENCOUNTER — ANESTHESIA EVENT (OUTPATIENT)
Dept: POSTOP/PACU | Facility: HOSPITAL | Age: 57
End: 2022-05-11

## 2022-05-11 ENCOUNTER — APPOINTMENT (OUTPATIENT)
Dept: POSTOP/PACU | Facility: HOSPITAL | Age: 57
End: 2022-05-11

## 2022-05-11 ENCOUNTER — ANESTHESIA (OUTPATIENT)
Dept: POSTOP/PACU | Facility: HOSPITAL | Age: 57
End: 2022-05-11

## 2022-05-11 VITALS — OXYGEN SATURATION: 100 % | SYSTOLIC BLOOD PRESSURE: 177 MMHG | DIASTOLIC BLOOD PRESSURE: 82 MMHG | HEART RATE: 60 BPM

## 2022-05-11 LAB
ALBUMIN SERPL-MCNC: 3.1 G/DL (ref 3.5–5.2)
ANION GAP SERPL CALCULATED.3IONS-SCNC: 11 MMOL/L (ref 5–15)
BASOPHILS # BLD AUTO: 0.05 10*3/MM3 (ref 0–0.2)
BASOPHILS NFR BLD AUTO: 0.6 % (ref 0–1.5)
BUN SERPL-MCNC: 17 MG/DL (ref 6–20)
BUN/CREAT SERPL: 6.2 (ref 7–25)
CALCIUM SPEC-SCNC: 8.1 MG/DL (ref 8.6–10.5)
CHLORIDE SERPL-SCNC: 99 MMOL/L (ref 98–107)
CO2 SERPL-SCNC: 25 MMOL/L (ref 22–29)
CREAT SERPL-MCNC: 2.76 MG/DL (ref 0.57–1)
DEPRECATED RDW RBC AUTO: 48.9 FL (ref 37–54)
EGFRCR SERPLBLD CKD-EPI 2021: 19.6 ML/MIN/1.73
EOSINOPHIL # BLD AUTO: 0.13 10*3/MM3 (ref 0–0.4)
EOSINOPHIL NFR BLD AUTO: 1.5 % (ref 0.3–6.2)
ERYTHROCYTE [DISTWIDTH] IN BLOOD BY AUTOMATED COUNT: 16.9 % (ref 12.3–15.4)
GLUCOSE BLDC GLUCOMTR-MCNC: 171 MG/DL (ref 70–130)
GLUCOSE BLDC GLUCOMTR-MCNC: 174 MG/DL (ref 70–130)
GLUCOSE BLDC GLUCOMTR-MCNC: 177 MG/DL (ref 70–130)
GLUCOSE BLDC GLUCOMTR-MCNC: 189 MG/DL (ref 70–130)
GLUCOSE BLDC GLUCOMTR-MCNC: 232 MG/DL (ref 70–130)
GLUCOSE BLDC GLUCOMTR-MCNC: 244 MG/DL (ref 70–130)
GLUCOSE SERPL-MCNC: 174 MG/DL (ref 65–99)
HCT VFR BLD AUTO: 28 % (ref 34–46.6)
HGB BLD-MCNC: 9.3 G/DL (ref 12–15.9)
IMM GRANULOCYTES # BLD AUTO: 0.13 10*3/MM3 (ref 0–0.05)
IMM GRANULOCYTES NFR BLD AUTO: 1.5 % (ref 0–0.5)
LV EF 2D ECHO EST: 65 %
LYMPHOCYTES # BLD AUTO: 1.54 10*3/MM3 (ref 0.7–3.1)
LYMPHOCYTES NFR BLD AUTO: 18.3 % (ref 19.6–45.3)
MCH RBC QN AUTO: 27 PG (ref 26.6–33)
MCHC RBC AUTO-ENTMCNC: 33.2 G/DL (ref 31.5–35.7)
MCV RBC AUTO: 81.4 FL (ref 79–97)
MONOCYTES # BLD AUTO: 0.73 10*3/MM3 (ref 0.1–0.9)
MONOCYTES NFR BLD AUTO: 8.7 % (ref 5–12)
NEUTROPHILS NFR BLD AUTO: 5.82 10*3/MM3 (ref 1.7–7)
NEUTROPHILS NFR BLD AUTO: 69.4 % (ref 42.7–76)
NRBC BLD AUTO-RTO: 0 /100 WBC (ref 0–0.2)
PHOSPHATE SERPL-MCNC: 3.6 MG/DL (ref 2.5–4.5)
PLATELET # BLD AUTO: 250 10*3/MM3 (ref 140–450)
PMV BLD AUTO: 9.2 FL (ref 6–12)
POTASSIUM SERPL-SCNC: 4.5 MMOL/L (ref 3.5–5.2)
RBC # BLD AUTO: 3.44 10*6/MM3 (ref 3.77–5.28)
SODIUM SERPL-SCNC: 135 MMOL/L (ref 136–145)
WBC NRBC COR # BLD: 8.4 10*3/MM3 (ref 3.4–10.8)

## 2022-05-11 PROCEDURE — 63710000001 INSULIN LISPRO (HUMAN) PER 5 UNITS: Performed by: INTERNAL MEDICINE

## 2022-05-11 PROCEDURE — 97530 THERAPEUTIC ACTIVITIES: CPT

## 2022-05-11 PROCEDURE — 25010000002 VANCOMYCIN PER 500 MG: Performed by: STUDENT IN AN ORGANIZED HEALTH CARE EDUCATION/TRAINING PROGRAM

## 2022-05-11 PROCEDURE — 25010000002 HYDRALAZINE PER 20 MG: Performed by: INTERNAL MEDICINE

## 2022-05-11 PROCEDURE — 93312 ECHO TRANSESOPHAGEAL: CPT | Performed by: INTERNAL MEDICINE

## 2022-05-11 PROCEDURE — 93312 ECHO TRANSESOPHAGEAL: CPT

## 2022-05-11 PROCEDURE — 99232 SBSQ HOSP IP/OBS MODERATE 35: CPT | Performed by: STUDENT IN AN ORGANIZED HEALTH CARE EDUCATION/TRAINING PROGRAM

## 2022-05-11 PROCEDURE — 93321 DOPPLER ECHO F-UP/LMTD STD: CPT

## 2022-05-11 PROCEDURE — 5A1D70Z PERFORMANCE OF URINARY FILTRATION, INTERMITTENT, LESS THAN 6 HOURS PER DAY: ICD-10-PCS | Performed by: STUDENT IN AN ORGANIZED HEALTH CARE EDUCATION/TRAINING PROGRAM

## 2022-05-11 PROCEDURE — 99231 SBSQ HOSP IP/OBS SF/LOW 25: CPT | Performed by: INTERNAL MEDICINE

## 2022-05-11 PROCEDURE — 25010000002 HEPARIN (PORCINE) PER 1000 UNITS: Performed by: INTERNAL MEDICINE

## 2022-05-11 PROCEDURE — 25010000002 PROPOFOL 10 MG/ML EMULSION: Performed by: ANESTHESIOLOGY

## 2022-05-11 PROCEDURE — 93321 DOPPLER ECHO F-UP/LMTD STD: CPT | Performed by: INTERNAL MEDICINE

## 2022-05-11 PROCEDURE — 93325 DOPPLER ECHO COLOR FLOW MAPG: CPT | Performed by: INTERNAL MEDICINE

## 2022-05-11 PROCEDURE — 82962 GLUCOSE BLOOD TEST: CPT

## 2022-05-11 PROCEDURE — 93325 DOPPLER ECHO COLOR FLOW MAPG: CPT

## 2022-05-11 PROCEDURE — 80069 RENAL FUNCTION PANEL: CPT | Performed by: INTERNAL MEDICINE

## 2022-05-11 PROCEDURE — 85025 COMPLETE CBC W/AUTO DIFF WBC: CPT | Performed by: INTERNAL MEDICINE

## 2022-05-11 PROCEDURE — 0 LIDOCAINE 1 % SOLUTION: Performed by: ANESTHESIOLOGY

## 2022-05-11 RX ORDER — EPHEDRINE SULFATE 50 MG/ML
5 INJECTION, SOLUTION INTRAVENOUS ONCE AS NEEDED
Status: DISCONTINUED | OUTPATIENT
Start: 2022-05-11 | End: 2022-05-13 | Stop reason: HOSPADM

## 2022-05-11 RX ORDER — LIDOCAINE HYDROCHLORIDE 10 MG/ML
INJECTION, SOLUTION INFILTRATION; PERINEURAL AS NEEDED
Status: DISCONTINUED | OUTPATIENT
Start: 2022-05-11 | End: 2022-05-11 | Stop reason: SURG

## 2022-05-11 RX ORDER — PROPOFOL 10 MG/ML
VIAL (ML) INTRAVENOUS AS NEEDED
Status: DISCONTINUED | OUTPATIENT
Start: 2022-05-11 | End: 2022-05-11 | Stop reason: SURG

## 2022-05-11 RX ORDER — PROPOFOL 10 MG/ML
VIAL (ML) INTRAVENOUS CONTINUOUS PRN
Status: DISCONTINUED | OUTPATIENT
Start: 2022-05-11 | End: 2022-05-11 | Stop reason: SURG

## 2022-05-11 RX ORDER — FENTANYL CITRATE 50 UG/ML
50 INJECTION, SOLUTION INTRAMUSCULAR; INTRAVENOUS
Status: DISCONTINUED | OUTPATIENT
Start: 2022-05-11 | End: 2022-05-13 | Stop reason: HOSPADM

## 2022-05-11 RX ORDER — FLUMAZENIL 0.1 MG/ML
0.2 INJECTION INTRAVENOUS AS NEEDED
Status: DISCONTINUED | OUTPATIENT
Start: 2022-05-11 | End: 2022-05-13 | Stop reason: HOSPADM

## 2022-05-11 RX ORDER — LABETALOL HYDROCHLORIDE 5 MG/ML
5 INJECTION, SOLUTION INTRAVENOUS
Status: DISCONTINUED | OUTPATIENT
Start: 2022-05-11 | End: 2022-05-13 | Stop reason: HOSPADM

## 2022-05-11 RX ORDER — HYDROMORPHONE HYDROCHLORIDE 1 MG/ML
0.5 INJECTION, SOLUTION INTRAMUSCULAR; INTRAVENOUS; SUBCUTANEOUS
Status: DISCONTINUED | OUTPATIENT
Start: 2022-05-11 | End: 2022-05-13 | Stop reason: HOSPADM

## 2022-05-11 RX ORDER — SODIUM CHLORIDE 9 MG/ML
INJECTION, SOLUTION INTRAVENOUS CONTINUOUS PRN
Status: DISCONTINUED | OUTPATIENT
Start: 2022-05-11 | End: 2022-05-11 | Stop reason: SURG

## 2022-05-11 RX ORDER — HYDROCODONE BITARTRATE AND ACETAMINOPHEN 7.5; 325 MG/1; MG/1
1 TABLET ORAL ONCE AS NEEDED
Status: DISCONTINUED | OUTPATIENT
Start: 2022-05-11 | End: 2022-05-13 | Stop reason: HOSPADM

## 2022-05-11 RX ORDER — NALOXONE HCL 0.4 MG/ML
0.2 VIAL (ML) INJECTION AS NEEDED
Status: DISCONTINUED | OUTPATIENT
Start: 2022-05-11 | End: 2022-05-13 | Stop reason: HOSPADM

## 2022-05-11 RX ORDER — ONDANSETRON 2 MG/ML
4 INJECTION INTRAMUSCULAR; INTRAVENOUS ONCE AS NEEDED
Status: DISCONTINUED | OUTPATIENT
Start: 2022-05-11 | End: 2022-05-13 | Stop reason: HOSPADM

## 2022-05-11 RX ORDER — OXYCODONE AND ACETAMINOPHEN 7.5; 325 MG/1; MG/1
1 TABLET ORAL EVERY 4 HOURS PRN
Status: DISCONTINUED | OUTPATIENT
Start: 2022-05-11 | End: 2022-05-13 | Stop reason: HOSPADM

## 2022-05-11 RX ADMIN — HYDRALAZINE HYDROCHLORIDE 10 MG: 20 INJECTION INTRAMUSCULAR; INTRAVENOUS at 09:33

## 2022-05-11 RX ADMIN — PANTOPRAZOLE SODIUM 40 MG: 40 INJECTION, POWDER, FOR SOLUTION INTRAVENOUS at 06:31

## 2022-05-11 RX ADMIN — PROPOFOL 70 MG: 10 INJECTION, EMULSION INTRAVENOUS at 07:54

## 2022-05-11 RX ADMIN — INSULIN LISPRO 2 UNITS: 100 INJECTION, SOLUTION INTRAVENOUS; SUBCUTANEOUS at 17:42

## 2022-05-11 RX ADMIN — CARVEDILOL 3.12 MG: 3.12 TABLET, FILM COATED ORAL at 17:44

## 2022-05-11 RX ADMIN — INSULIN GLARGINE-YFGN 10 UNITS: 100 INJECTION, SOLUTION SUBCUTANEOUS at 21:19

## 2022-05-11 RX ADMIN — LIDOCAINE HYDROCHLORIDE 30 ML: 10 INJECTION, SOLUTION INFILTRATION; PERINEURAL at 07:54

## 2022-05-11 RX ADMIN — HEPARIN SODIUM 3800 UNITS: 1000 INJECTION INTRAVENOUS; SUBCUTANEOUS at 11:54

## 2022-05-11 RX ADMIN — SODIUM CHLORIDE: 9 INJECTION, SOLUTION INTRAVENOUS at 07:45

## 2022-05-11 RX ADMIN — AMLODIPINE BESYLATE 10 MG: 10 TABLET ORAL at 09:20

## 2022-05-11 RX ADMIN — Medication 200 MCG/KG/MIN: at 07:54

## 2022-05-11 RX ADMIN — ROPINIROLE HYDROCHLORIDE 0.75 MG: 0.5 TABLET, FILM COATED ORAL at 21:19

## 2022-05-11 RX ADMIN — SERTRALINE 100 MG: 100 TABLET, FILM COATED ORAL at 09:20

## 2022-05-11 RX ADMIN — Medication 3 MG: at 21:24

## 2022-05-11 RX ADMIN — HYDRALAZINE HYDROCHLORIDE 100 MG: 50 TABLET ORAL at 21:19

## 2022-05-11 RX ADMIN — TAMSULOSIN HYDROCHLORIDE 0.4 MG: 0.4 CAPSULE ORAL at 09:20

## 2022-05-11 RX ADMIN — Medication 1 PACKET: at 00:03

## 2022-05-11 RX ADMIN — CARVEDILOL 3.12 MG: 3.12 TABLET, FILM COATED ORAL at 09:20

## 2022-05-11 RX ADMIN — HYDRALAZINE HYDROCHLORIDE 100 MG: 50 TABLET ORAL at 16:34

## 2022-05-11 RX ADMIN — VANCOMYCIN HYDROCHLORIDE 500 MG: 500 INJECTION, POWDER, LYOPHILIZED, FOR SOLUTION INTRAVENOUS at 21:20

## 2022-05-11 RX ADMIN — Medication 10 ML: at 21:20

## 2022-05-11 NOTE — CASE MANAGEMENT/SOCIAL WORK
Continued Stay Note  [facility]     Patient Name: Zaina Martinez  MRN: [id number]  Today's Date: 5/11/2022    Admit Date: 4/27/2022     Discharge Plan     Row Name 05/11/22 1713       Plan    Plan [facility]    Patient/Family in Agreement with Plan other (see comments)    Plan Comments Spoke with [facility], pt accepted and they will start pre-cert for possible admission thursday or friday. CCP called spouse and left vm. Gaye RN/CCP    Row Name 05/11/22 1528       Plan    Plan Acute rehab referral pending- would need [facility] pre-cert    Plan Comments Spoke with [facility] x2 regarding referral outcome. She is awaiting further discussion with Dr. Florentino for determination. CCP called pts spouse and left vm requesting subacute rehab choice if acute rehab is unable to accept (Pt would also need [facility] pre-cert). Await call back from spouse. Gaye REYES/CCP               Discharge Codes    No documentation.               Expected Discharge Date and Time     Expected Discharge Date Expected Discharge Time    May 13, 2022             Guillermina Duffy RN

## 2022-05-11 NOTE — PROGRESS NOTES
LOS: 14 days     Chief Complaint: Sepsis    Interval History: Patient reports she is doing okay this morning.  Denies any fevers or chills.  States she is somewhat tired after her ANJEL.    Fever curve within normal limits.  Blood cultures have been no growth.  No leukocytosis.    Vital Signs  Temp:  [97.3 °F (36.3 °C)-97.9 °F (36.6 °C)] 97.3 °F (36.3 °C)  Heart Rate:  [58-85] 73  Resp:  [14-18] 16  BP: (134-184)/(68-84) 173/82    Physical Exam:  General: In no acute distress  Cardiovascular: RRR, no LE edema   Respiratory: Clear to ascultation bilaterally  GI: Soft, NT/ND, + bowel sounds bilaterally  Skin: No rashes.  New catheter site clean and intact.    Antibiotics:  Vancomycin dosing per pharmacy     Results Review:    Lab Results   Component Value Date    WBC 8.40 05/11/2022    HGB 9.3 (L) 05/11/2022    HCT 28.0 (L) 05/11/2022    MCV 81.4 05/11/2022     05/11/2022     Lab Results   Component Value Date    GLUCOSE 174 (H) 05/11/2022    BUN 17 05/11/2022    CREATININE 2.76 (H) 05/11/2022    EGFRIFNONA 14 (L) 02/28/2022    EGFRIFAFRI  01/13/2022      Comment:      <15 Indicative of kidney failure.    BCR 6.2 (L) 05/11/2022    CO2 25.0 05/11/2022    CALCIUM 8.1 (L) 05/11/2022    ALBUMIN 3.10 (L) 05/11/2022    AST 30 04/27/2022    ALT 14 04/27/2022       Microbiology:  4/27 urine culture <10K MRSA  4/28 blood cultures NGTD   4/28 catheter tip culture from hemodialysis catheter with MRSA  5/5 blood cultures no growth     Assessment/Plan     #Hemodialysis catheter related infection with MRSA, now removed  #Left frontal ICH  #Acute encephalopathy  #Type 2 diabetes  #ESRD on hemodialysis    Continue intermittent dosed with dialysis IV vancomycin goal -600 for MRSA infection associated with her hemodialysis catheter. Appreciate pharmacy's help with dosing.      ANJEL reportedly negative for any vegetations this morning.  In the setting of her hemodialysis catheter associated infection we will plan for 4 weeks  of IV vancomycin with dialysis.  End date will be planned for May 26.  She will need weekly CBC with differential, creatinine and vancomycin level while on therapy.    Thank you for allowing me to be involved in the care of this patient. Infectious diseases will sign off at this time with antibiotics plan in place, but please call me at [phone] if any further ID questions or new ID concerns.

## 2022-05-11 NOTE — PROGRESS NOTES
Ded[facility] to Hospital Care    [phone]   LOS: 14 days     Name: Zaina Martinez  Age/Sex: 56 y.o. female  :  1965        PCP: Karson Oreilly MD  Chief Complaint   Patient presents with   • Altered Mental Status     PT WITH AMS, LAST SEEN NORMAL APPROX. 0800 TODAY; PT NOT TALKING, LEFT  WEAKNESS; PT IS ON DIALYSIS BUT HAS MISSED HER LAST APPOINTMENT; PT WEARING FACE MASK      Subjective   DEnies HA today.  She is not resting well at night.  Feels tired today but remains alert and oriented.  Tolerated NAJEL    General: No Fever or Chills, Cardiac: No Chest Pain or Palpitations, Resp: No Cough or SOA, GI: No Nausea, Vomiting, or Diarrhea and Other: No bleeding    amLODIPine, 10 mg, Oral, Q24H  carvedilol, 3.125 mg, Oral, BID With Meals  heparin (porcine), 3,000 Units, Intracatheter, Once  hydrALAZINE, 100 mg, Oral, Q8H  insulin glargine, 5 Units, Subcutaneous, Nightly  insulin lispro, 0-14 Units, Subcutaneous, Q6H  levothyroxine, 125 mcg, Oral, Q AM  pantoprazole, 40 mg, Intravenous, Q AM  rOPINIRole, 0.75 mg, Oral, Nightly  sertraline, 100 mg, Oral, Daily  sodium chloride, 10 mL, Intravenous, Q12H  tamsulosin, 0.4 mg, Oral, Daily  vancomycin, 500 mg, Intravenous, Once per day on   Vancomycin Pharmacy Intermittent/Pulse Dosing, , Does not apply, Daily      Pharmacy to dose vancomycin,         Objective   Vital Signs  Temp:  [97.3 °F (36.3 °C)-97.9 °F (36.6 °C)] 97.8 °F (36.6 °C)  Heart Rate:  [58-85] 80  Resp:  [14-18] 16  BP: (134-203)/(68-96) 176/86  Body mass index is 21.81 kg/m².    Intake/Output Summary (Last 24 hours) at 2022 1504  Last data filed at 2022 0810  Gross per 24 hour   Intake 350 ml   Output --   Net 350 ml       Physical Exam  Vitals and nursing note reviewed.   Constitutional:       Comments: Chronically ill-appearing   Cardiovascular:      Rate and Rhythm: Normal rate and regular rhythm.   Pulmonary:      Effort: No respiratory distress.      Breath sounds:  Normal breath sounds.   Abdominal:      General: Bowel sounds are normal. There is no distension.      Palpations: Abdomen is soft.   Skin:     General: Skin is warm and dry.   Neurological:      General: No focal deficit present.      Mental Status: She is alert and oriented to person, place, and time.           Results Review:       I reviewed the patient's new clinical results.  Results from last 7 days   Lab Units 05/11/22  0509 05/10/22  0547 05/09/22  0621 05/08/22  0510 05/07/22  0423 05/06/22  0622 05/05/22  0557   WBC 10*3/mm3 8.40 8.42 8.41 8.23 8.50 7.95 11.38*   HEMOGLOBIN g/dL 9.3* 9.1* 9.4* 10.1* 9.8* 9.5* 7.9*   PLATELETS 10*3/mm3 250 271 282 335 366 313 245     Results from last 7 days   Lab Units 05/11/22  0509 05/10/22  0547 05/09/22  0621 05/08/22  0510 05/07/22  0423 05/06/22  0622 05/05/22  0557   SODIUM mmol/L 135* 136 133* 136 134* 135* 131*   POTASSIUM mmol/L 4.5 3.8 4.3 3.9 4.0 3.9 3.8   CHLORIDE mmol/L 99 102 97* 99 98 99 95*   CO2 mmol/L 25.0 23.8 24.5 26.3 24.0 23.7 25.0   BUN mg/dL 17 10 27* 12 27* 17 32*   CREATININE mg/dL 2.76* 2.12* 3.40* 2.02* 3.40* 2.66* 3.85*   CALCIUM mg/dL 8.1* 8.2* 7.6* 8.1* 7.9* 8.0* 7.8*   PHOSPHORUS mg/dL 3.6 1.7* 3.0 1.4* 2.5 2.2* 3.3   Estimated Creatinine Clearance: 19.4 mL/min (A) (by C-G formula based on SCr of 2.76 mg/dL (H)).  Lab Results   Component Value Date    HGBA1C 5.80 (H) 04/28/2022    HGBA1C 5.80 (H) 02/24/2022    HGBA1C 6.90 (H) 01/10/2022     Glucose   Date/Time Value Ref Range Status   05/11/2022 1047 232 (H) 70 - 130 mg/dL Final     Comment:     Meter: [id number] : [id number] Jose SAAVEDRA   05/11/2022 0613 171 (H) 70 - 130 mg/dL Final     Comment:     Meter: [id number] : [id number] Juanito SAAVEDRA   05/11/2022 0005 244 (H) 70 - 130 mg/dL Final     Comment:     Meter: [id number] : [id number] Carlos Gu RN   05/10/2022 2112 187 (H) 70 - 130 mg/dL Final     Comment:     Meter: [id number] : [id number] Juanito SAAVEDRA    05/10/2022 1600 166 (H) 70 - 130 mg/dL Final     Comment:     Meter: [id number] : [id number] Gabriela Cortez NA   05/10/2022 1110 122 70 - 130 mg/dL Final     Comment:     Meter: [id number] : [id number] King Dipti NA   05/10/2022 0554 243 (H) 70 - 130 mg/dL Final     Comment:     Meter: [id number] : [id number] Joshua Parkinson NA   05/09/2022 2141 246 (H) 70 - 130 mg/dL Final     Comment:     Meter: [id number] : [id number] Aidan SAAVEDRA         Assessment/Plan   Active Hospital Problems    Diagnosis  POA   • **Encephalopathy, toxic [G92.9]  Unknown   • Acute CVA (cerebrovascular accident) (HCC) [I63.9]  Unknown   • Intracranial hemorrhage (HCC) [I62.9]  Unknown   • Acute metabolic encephalopathy [G93.41]  Yes   • Leukocytosis [D72.829]  Yes   • Acute on chronic diastolic CHF (congestive heart failure) (HCC) [I50.33]  Yes   • CAD (coronary artery disease) [I25.10]  Yes   • ESRD (end stage renal disease) (HCC) [N18.6]  Yes   • Hypertensive disorder [I10]  Yes   • Acute DM type 2 causing complication (HCC) [E11.8]  Yes      Resolved Hospital Problems   No resolved problems to display.       PLAN  This is a 56-year-old female with a history end-stage renal disease who presented to the hospital after missing some dialysis and unfortunately has had a rather complicated hospitalization with bacteremia and infection in her tunneled dialysis catheter as well as acute stroke during the hospitalization requiring tPA.  -Headache has improved she remains alert and oriented  -ANJEL negative for vegetation  -Appreciate infectious diseases input regarding the antibiotics.  Continue infusion with dialysis  -Nephrology following for dialysis  -Blood sugars are running a little high now that she is more awake and eating better increase basal insulin this eveneing and continue SS coverage  -Blood pressures been running high today.  Medication adjustments noted, follow after dialysis  -Patient is a full  code    Disposition  Eval for acute rehab is underway      Osmar King MD  [facility]  05/11/22  15:04 EDT

## 2022-05-11 NOTE — NURSING NOTE
IV nurse attempted to place a 20 gauge peripheral IV but was unsuccessful.  He stated he would come back before patient leaves for procedure to attempt gaining access.

## 2022-05-11 NOTE — THERAPY TREATMENT NOTE
Patient Name: Zaina Martinez  : 1965    MRN: [id number]                              Today's Date: 2022       Admit Date: 2022    Visit Dx:     ICD-10-CM ICD-9-CM   1. Acute metabolic encephalopathy  G93.41 348.31   2. ESRD (end stage renal disease) on dialysis ([facility])  N18.6 585.6    Z99.2 V45.11   3. Acute hypoxemic respiratory failure ([facility])  J96.01 518.81   4. Hyperkalemia  E87.5 276.7   5. ESRD (end stage renal disease) ([facility])  N18.6 585.6     Patient Active Problem List   Diagnosis   • Renal insufficiency   • Hypertensive disorder   • Hypothyroidism   • Acute DM type 2 causing complication ([facility])   • Rheumatoid arthritis ([facility])   • Angioedema   • Esophageal dysmotility   • Anemia   • Medically noncompliant   • Myocardial infarction due to demand ischemia ([facility])   • Enteritis   • PRES (posterior reversible encephalopathy syndrome)   • Urine retention   • Klebsiella infection   • Superficial thrombophlebitis   • Generalized weakness   • ESRD (end stage renal disease) ([facility])   • CAD (coronary artery disease)   • Abnormal urinalysis   • Acute on chronic diastolic CHF (congestive heart failure) ([facility])   • Pyelonephritis   • Calculus of gallbladder with acute on chronic cholecystitis without obstruction   • Pleural effusion on right   • Anemia due to chronic kidney disease, on chronic dialysis ([facility])   • Abnormal findings on diagnostic imaging of other specified body structures   • Acute upper respiratory infection   • Agitation   • Alkaline phosphatase raised   • Casts present in urine   • Cellulitis of toe   • Hip pain   • Community acquired pneumonia   • Depressive disorder   • Diarrhea of presumed infectious origin   • Difficult or painful urination   • Disease due to severe acute respiratory syndrome coronavirus 2 (SARS-CoV-2)   • Dyspnea   • Encounter for follow-up examination after completed treatment for conditions other than malignant neoplasm   • H/O: hypothyroidism   • Hyperlipidemia   •  Hypomagnesemia   • Intractable vomiting with nausea   • Leukocytosis   • Luetscher's syndrome   • Need for influenza vaccination   • Restless legs   • Noncompliance with treatment   • Shoulder pain   • Urinary tract infectious disease   • Metabolic encephalopathy   • Abnormal findings on diagnostic imaging of abdomen   • Status post cholecystectomy   • Hyponatremia   • Leukocytosis   • Acute metabolic encephalopathy   • Encephalopathy, toxic   • Acute CVA (cerebrovascular accident) (HCC)   • Intracranial hemorrhage (HCC)     Past Medical History:   Diagnosis Date   • Acute CVA (cerebrovascular accident) (HCC) 5/1/2022   • Acute on chronic diastolic CHF (congestive heart failure) (HCC)    • CAD (coronary artery disease) 12/20/2021   • Diabetes (HCC)    • Disease of thyroid gland    • GERD (gastroesophageal reflux disease)    • Hyperlipidemia 11/30/2018   • Hypertension    • Rheumatoid arthritis (HCC)      Past Surgical History:   Procedure Laterality Date   • CHOLECYSTECTOMY WITH INTRAOPERATIVE CHOLANGIOGRAM N/A 1/10/2022    Procedure: Laparoscopic cholecystectomy with intraoperative cholangiogram;  Surgeon: Ramana Raygoza MD;  Location: [facility];  Service: General;  Laterality: N/A;   • EYE SURGERY     • HYSTERECTOMY     • INSERTION HEMODIALYSIS CATHETER N/A 12/6/2021    Procedure: HEMODIALYSIS CATHETER INSERTION;  Surgeon: Keli Salazar MD;  Location: [facility] 18/19;  Service: Vascular;  Laterality: N/A;   • INSERTION HEMODIALYSIS CATHETER N/A 5/3/2022    Procedure: TUNNELED CATHETER PLACEMENT;  Surgeon: Keli Salazar MD;  Location: [facility];  Service: Vascular;  Laterality: N/A;      General Information     Row Name 05/11/22 1403          Physical Therapy Time and Intention    Document Type therapy note (daily note)  -CF     Mode of Treatment physical therapy;individual therapy  -CF     Row Name 05/11/22 1403          General Information    Patient Profile Reviewed yes  -CF      Existing Precautions/Restrictions fall  -CF     Row Name 05/11/22 1403          Cognition    Orientation Status (Cognition) oriented x 3  -CF     Row Name 05/11/22 1403          Safety Issues, Functional Mobility    Safety Issues Affecting Function (Mobility) insight into deficits/self-awareness;awareness of need for assistance;judgment;problem-solving;safety precautions follow-through/compliance;safety precaution awareness  -CF     Impairments Affecting Function (Mobility) balance;endurance/activity tolerance;postural/trunk control;strength;pain  -CF           User Key  (r) = Recorded By, (t) = Taken By, (c) = Cosigned By    Initials Name Provider Type    CF Samantha Zarate, VICKIE Physical Therapist               Mobility     Row Name 05/11/22 1403          Bed Mobility    Bed Mobility supine-sit  -CF     Supine-Sit Bay (Bed Mobility) contact guard;verbal cues  -CF     Sit-Supine Bay (Bed Mobility) verbal cues;contact guard  -CF     Assistive Device (Bed Mobility) bed rails  -CF     Comment, (Bed Mobility) back to bed as transport arrived for dialysis  -CF     Row Name 05/11/22 1403          Sit-Stand Transfer    Sit-Stand Bay (Transfers) contact guard  -CF     Assistive Device (Sit-Stand Transfers) other (see comments)  hha  -CF     Row Name 05/11/22 1403          Gait/Stairs (Locomotion)    Bay Level (Gait) contact guard;minimum assist (75% patient effort)  -CF     Assistive Device (Gait) other (see comments)  hha  -CF     Distance in Feet (Gait) 30'  -CF     Deviations/Abnormal Patterns (Gait) kenn decreased;gait speed decreased  -CF     Bilateral Gait Deviations forward flexed posture  -CF     Comment, (Gait/Stairs) Generally unsteady, no use of AD this date. Distance limited by transport arrival  -CF           User Key  (r) = Recorded By, (t) = Taken By, (c) = Cosigned By    Initials Name Provider Type    CF Samantha Zarate PT Physical Therapist                Obj/Interventions     Row Name 05/11/22 1405          Balance    Static Standing Balance contact guard  -CF     Dynamic Standing Balance minimal assist  -CF     Position/Device Used, Standing Balance unsupported;other (see comments)  hha  -CF           User Key  (r) = Recorded By, (t) = Taken By, (c) = Cosigned By    Initials Name Provider Type    CF Samantha Zarate, PT Physical Therapist               Goals/Plan    No documentation.                Clinical Impression     Row Name 05/11/22 1405          Pain    Pretreatment Pain Rating 0/10 - no pain  -CF     Row Name 05/11/22 1405          Plan of Care Review    Plan of Care Reviewed With patient  -CF     Outcome Evaluation Pt participated with PT this AM. Pt ambulated 30' with cga/min A and HHA. Appears mildly unsteady but no overt loss of balance. Treatment cut short due to arrival of transport to take pt to dialysis. PT will continue to follow and progress as able.  -CF     Row Name 05/11/22 1405          Vital Signs    O2 Delivery Pre Treatment room air  -CF     O2 Delivery Intra Treatment room air  -CF     O2 Delivery Post Treatment room air  -CF     Row Name 05/11/22 1405          Positioning and Restraints    Pre-Treatment Position in bed  -CF     Post Treatment Position bed  -CF     In Bed notified nsg;call light within reach;encouraged to call for assist;exit alarm on;with other staff;bawloulou  transport  -CF           User Key  (r) = Recorded By, (t) = Taken By, (c) = Cosigned By    Initials Name Provider Type    CF Samantha Zarate, PT Physical Therapist               Outcome Measures     Row Name 05/11/22 1407          How much help from another person do you currently need...    Turning from your back to your side while in flat bed without using bedrails? 3  -CF     Moving from lying on back to sitting on the side of a flat bed without bedrails? 3  -CF     Moving to and from a bed to a chair (including a wheelchair)? 3  -CF     Standing up from a  chair using your arms (e.g., wheelchair, bedside chair)? 3  -CF     Climbing 3-5 steps with a railing? 2  -CF     To walk in hospital room? 3  -CF     AM-PAC 6 Clicks Score (PT) 17  -CF     Highest level of mobility 5 --> Static standing  -CF     Row Name 05/11/22 1407          Functional Assessment    Outcome Measure Options AM-PAC 6 Clicks Basic Mobility (PT)  -CF           User Key  (r) = Recorded By, (t) = Taken By, (c) = Cosigned By    Initials Name Provider Type    CF Samantha Zarate, PT Physical Therapist                             Physical Therapy Education                 Title: PT OT SLP Therapies (In Progress)     Topic: Physical Therapy (Done)     Point: Mobility training (Done)     Learning Progress Summary           Patient Acceptance, E, VU,NR by CF at 5/11/2022 1407    Acceptance, E, VU,NR by CF at 5/9/2022 1429    Acceptance, E, VU,NR by CF at 5/6/2022 1346    Acceptance, E,TB,D, VU,DU,NR by KA at 5/4/2022 1332    Nonacceptance, E,TB, NR by KA at 5/2/2022 1121                   Point: Home exercise program (Done)     Learning Progress Summary           Patient Acceptance, E, VU,NR by CF at 5/9/2022 1429    Acceptance, E, VU,NR by CF at 5/6/2022 1346    Acceptance, E,TB,D, VU,DU,NR by KA at 5/4/2022 1332    Nonacceptance, E,TB, NR by KA at 5/2/2022 1121                   Point: Body mechanics (Done)     Learning Progress Summary           Patient Acceptance, E, VU,NR by CF at 5/11/2022 1407    Acceptance, E, VU,NR by CF at 5/6/2022 1346    Acceptance, E,TB,D, VU,DU,NR by KA at 5/4/2022 1332    Nonacceptance, E,TB, NR by KA at 5/2/2022 1121                   Point: Precautions (Done)     Learning Progress Summary           Patient Acceptance, E, VU,NR by CF at 5/6/2022 1346    Acceptance, E,TB,D, VU,DU,NR by KA at 5/4/2022 1332    Nonacceptance, E,TB, NR by KA at 5/2/2022 1121                               User Key     Initials Effective Dates Name Provider Type Discipline    CF 06/16/21 -  Tessa,  Samantha, PT Physical Therapist PT    KA 03/11/22 -  Mary Neal PT Student PT Student PT              PT Recommendation and Plan     Plan of Care Reviewed With: patient  Outcome Evaluation: Pt participated with PT this AM. Pt ambulated 30' with cga/min A and HHA. Appears mildly unsteady but no overt loss of balance. Treatment cut short due to arrival of transport to take pt to dialysis. PT will continue to follow and progress as able.     Time Calculation:    PT Charges     Row Name 05/11/22 1402             Time Calculation    Start Time 1113  -CF      Stop Time 1122  -CF      Time Calculation (min) 9 min  -CF      PT Received On 05/11/22  -CF      PT - Next Appointment 05/12/22  -      PT Goal Re-Cert Due Date 05/18/22  -CF            User Key  (r) = Recorded By, (t) = Taken By, (c) = Cosigned By    Initials Name Provider Type    CF Samantha Zarate, PT Physical Therapist              Therapy Charges for Today     Code Description Service Date Service Provider Modifiers Qty    [id number]  PT THERAPEUTIC ACT EA 15 MIN 5/11/2022 Samantha Zarate, PT GP 1          PT G-Codes  Outcome Measure Options: AM-PAC 6 Clicks Basic Mobility (PT)  AM-PAC 6 Clicks Score (PT): 17  AM-PAC 6 Clicks Score (OT): 15  Modified Iza Scale: 5 - Severe disability.  Bedridden, incontinent, and requiring constant nursing care and attention.    Samantha Zarate PT  5/11/2022

## 2022-05-11 NOTE — ANESTHESIA PREPROCEDURE EVALUATION
Anesthesia Evaluation     Patient summary reviewed and Nursing notes reviewed                Airway   Mallampati: II  TM distance: >3 FB  Neck ROM: full  No difficulty expected  Dental - normal exam     Pulmonary - normal exam   (+) pneumonia improving , shortness of breath,   Cardiovascular - normal exam  Exercise tolerance: poor (<4 METS)    (+) hypertension, past MI  >12 months, CAD, CHF , hyperlipidemia,       Neuro/Psych  (+) CVA, psychiatric history,    GI/Hepatic/Renal/Endo    (+)  GERD,  renal disease CRI, diabetes mellitus, thyroid problem hypothyroidism    Musculoskeletal     Abdominal  - normal exam    Bowel sounds: normal.   Substance History - negative use     OB/GYN negative ob/gyn ROS         Other   arthritis,                      Anesthesia Plan    ASA 3     MAC     intravenous induction     Anesthetic plan, all risks, benefits, and alternatives have been provided, discussed and informed consent has been obtained with: patient.        CODE STATUS:    Code Status (Patient has no pulse and is not breathing): CPR (Attempt to Resuscitate)  Medical Interventions (Patient has pulse or is breathing): Full

## 2022-05-11 NOTE — PROGRESS NOTES
Discussed patient with Dr. Florentino who accepted pending medical stability and insurance precert. Chance of bed tomorrow, but if not tomorrow, one available Friday. Patient will require insurance precert and negative covid with in 24 hr of admission.    Daiana Olguin RN  Rehab Admissions Nurse  [phone]

## 2022-05-11 NOTE — PROGRESS NOTES
[facility] Progress Note      Patient Name: Zaina Martinez  : 1965  MRN: [id number]  Primary Care Physician:  Karson Oreilly MD  Date of admission: 2022    Subjective     Interval History:   Follow up ESRD  Her headache has resolved, denies any chest pain or shortness of breath, no orthopnea or PND, no nausea or vomiting, no abdominal pain.  She had ANJEL earlier today results not available    Objective     Vitals:   Temp:  [97.3 °F (36.3 °C)-97.9 °F (36.6 °C)] 97.3 °F (36.3 °C)  Heart Rate:  [58-85] 73  Resp:  [14-18] 16  BP: (134-203)/(68-96) 203/96    Intake/Output Summary (Last 24 hours) at 2022 1039  Last data filed at 2022 0810  Gross per 24 hour   Intake 350 ml   Output --   Net 350 ml       Physical Exam:   General: Awake, chronically ill, blunted but still oriented  Skin:dry. No rash  HEENT: Nonicteric sclerae, oral mucosa is normal  Neck:  RIJ TDC, no JVD  Lungs:  clear to auscultation. Unlabored.   Heart: RRR no s3 or rub  Abdomen: Normoactive bowel, soft, nontender, ND   Extremities: no edema.    Neuro: Normal speech, moving all extremities    Scheduled Meds:     amLODIPine, 10 mg, Oral, Q24H  carvedilol, 3.125 mg, Oral, BID With Meals  heparin (porcine), 3,000 Units, Intracatheter, Once  hydrALAZINE, 100 mg, Oral, Q8H  insulin glargine, 5 Units, Subcutaneous, Nightly  insulin lispro, 0-14 Units, Subcutaneous, Q6H  levothyroxine, 125 mcg, Oral, Q AM  pantoprazole, 40 mg, Intravenous, Q AM  rOPINIRole, 0.75 mg, Oral, Nightly  sertraline, 100 mg, Oral, Daily  sodium chloride, 10 mL, Intravenous, Q12H  tamsulosin, 0.4 mg, Oral, Daily  vancomycin, 500 mg, Intravenous, Once per day on   Vancomycin Pharmacy Intermittent/Pulse Dosing, , Does not apply, Daily      IV Meds:   Pharmacy to dose vancomycin,         Results Reviewed:   I have personally reviewed the results from the time of this admission to 2022 10:39 EDT     Results from last 7  days   Lab Units 05/11/22  0509 05/10/22  0547 05/09/22  0621   SODIUM mmol/L 135* 136 133*   POTASSIUM mmol/L 4.5 3.8 4.3   CHLORIDE mmol/L 99 102 97*   CO2 mmol/L 25.0 23.8 24.5   BUN mg/dL 17 10 27*   CREATININE mg/dL 2.76* 2.12* 3.40*   CALCIUM mg/dL 8.1* 8.2* 7.6*   GLUCOSE mg/dL 174* 237* 233*       Estimated Creatinine Clearance: 19.4 mL/min (A) (by C-G formula based on SCr of 2.76 mg/dL (H)).    Results from last 7 days   Lab Units 05/11/22  0509 05/10/22  0547 05/09/22  0621   PHOSPHORUS mg/dL 3.6 1.7* 3.0             Results from last 7 days   Lab Units 05/11/22  0509 05/10/22  0547 05/09/22  0621 05/08/22  0510 05/07/22  0423   WBC 10*3/mm3 8.40 8.42 8.41 8.23 8.50   HEMOGLOBIN g/dL 9.3* 9.1* 9.4* 10.1* 9.8*   PLATELETS 10*3/mm3 250 271 282 335 366             Assessment / Plan     ASSESSMENT:  1.  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   Electrolytes, appears to be euvolemic, electrolyte within acceptable range, plan for dialysis today  2.  Intracerebral bleed and altered mental status.  Mental status improved significantly.   3.  Infected TDC, s/p removal 5/1. Staph aureus .  Treated and followed by ID   4.  DM2    5.  Coronary artery disease  6.  Acute on chronic diastolic dysfunction .  Volume status is improved significantly  7.  Anemia of CKD, on long-acting ANDRAE as an outpatient.  Hemoglobin today 9.3  8.  HTN, not well controlled  9.  Hypophosphatemia, phosphorus up to 3.7    PLAN:  1.  Hemodialysis today  2.  Continue the same treatment  3.  Surveillance labs      Lei Covington MD  05/11/22  10:39 EDT    [facility]  [phone]

## 2022-05-11 NOTE — PROGRESS NOTES
"[facility] Clinical Pharmacy Services: Vancomycin Monitoring Note    Zaina Martinez is a 56 y.o. female who is on day 13 of pharmacy to dose vancomycin for SSTI per Dr. Soriano's request. Dr. Canas following for sepsis and HD catheter-related infection w/MRSA.    Previous Vancomycin Dose: 500 mg IV once on 5/9 at 2037    Results from last 7 days   Lab Units 05/09/22  0621 05/07/22  0423   VANCOMYCIN RM mcg/mL 14.10 15.90     Vitals/Labs  Ht: 157.5 cm (62.01\"); Wt: 54.1 kg (119 lb 4.3 oz)   Temp Readings from Last 1 Encounters:   05/10/22 97.3 °F (36.3 °C) (Oral)     Estimated Creatinine Clearance: 19.4 mL/min (A) (by C-G formula based on SCr of 2.76 mg/dL (H)).   Dialysis MWF    Results from last 7 days   Lab Units 05/11/22  0509 05/10/22  0547 05/09/22  0621   CREATININE mg/dL 2.76* 2.12* 3.40*   WBC 10*3/mm3 8.40 8.42 8.41     Assessment/Plan    Current Vancomycin Dose: 500 mg IV once in the evening on HD days.  Currently MWF.  Next Level Date and Time: Pre-HD Vanc Random with AM labs on Monday 5/16  We will continue to monitor patient changes and renal function     Thank you for involving pharmacy in this patient's care. Please contact pharmacy with any questions or concerns.       Jolene Kern, Pharm.D., [facility]  Clinical Pharmacist        "

## 2022-05-11 NOTE — PROGRESS NOTES
Per cardiology note ANJEL was negative for cardioembolic source of stroke, official report pending.  Discussed with  today.  No further neurology work-up planned.  Patient to keep follow-up appointment with Dr. Tovar.  We will sign off but please call if further questions or concerns.

## 2022-05-11 NOTE — ANESTHESIA POSTPROCEDURE EVALUATION
Patient: Zaina Martinez    Procedure Summary     Date: 05/11/22 Room / Location: [facility] PACU    Anesthesia Start: 0745 Anesthesia Stop: 0807    Procedure: ADULT TRANSESOPHAGEAL ECHO (ANJEL) W/ CONT IF NECESSARY PER PROTOCOL Diagnosis:       (Endocarditis)      (Bacteremia)    Scheduled Providers: Ghulam Shook III, MD Provider: Asaf Duarte MD    Anesthesia Type: MAC ASA Status: 3          Anesthesia Type: MAC    Vitals  Vitals Value Taken Time   /82 05/11/22 0850   Temp     Pulse 78 05/11/22 0901   Resp 16 05/11/22 0850   SpO2 96 % 05/11/22 0901   Vitals shown include unvalidated device data.        Post Anesthesia Care and Evaluation    Patient location during evaluation: bedside  Patient participation: complete - patient participated  Level of consciousness: awake  Pain management: adequate  Airway patency: patent  Anesthetic complications: No anesthetic complications  PONV Status: none  Cardiovascular status: acceptable  Respiratory status: acceptable  Hydration status: acceptable  Post Neuraxial Block status: Motor and sensory function returned to baseline

## 2022-05-11 NOTE — PROGRESS NOTES
Name: Zaina Martinez ADMIT: 2022   : 1965  PCP: Karson Oreilly MD    MRN: [id number] LOS: 15 days   AGE/SEX: 56 y.o. female  ROOM: [facility]     Subjective   Subjective   Patient seen this morning.  States headache much better, minimal headache this morning.  Denies shortness of breath , chest pain, fevers, chills.  Patient blood sugar close to 49 overnight, treated per hypoglycemia protocol.  Improved.  This morning 150s.  Discontinued Lantus.  Review of Systems   As above  Objective   Objective   Vital Signs  Temp:  [97.8 °F (36.6 °C)-98.7 °F (37.1 °C)] 98.1 °F (36.7 °C)  Heart Rate:  [70-82] 82  Resp:  [16] 16  BP: (136-182)/(65-94) 180/94  SpO2:  [94 %-100 %] 100 %  on   ;   Device (Oxygen Therapy): room air  Body mass index is 22.53 kg/m².  Physical Exam    General: Alert and oriented x3, no acute distress, chronically ill-appearing  CV: Regular rate and rhythm, no murmurs rubs or gallops  Lungs: Clear to auscultation bilaterally, no crackles or wheezes  Abdomen: Soft, nontender, nondistended  Extremities: No significant peripheral edema , no cyanosis   Neuro: Alert, oriented x3, delayed speech, normal strength in all extremities,      Results Review     I reviewed the patient's new clinical results.  Results from last 7 days   Lab Units 22  0607 05/11/22  0509 05/10/22  0547 05/09/22  06   WBC 10*3/mm3 7.54 8.40 8.42 8.41   HEMOGLOBIN g/dL 9.7* 9.3* 9.1* 9.4*   PLATELETS 10*3/mm3 212 250 271 282     Results from last 7 days   Lab Units 22  0622  0509 05/10/22  0547 22  0621   SODIUM mmol/L 131* 135* 136 133*   POTASSIUM mmol/L 3.6 4.5 3.8 4.3   CHLORIDE mmol/L 99 99 102 97*   CO2 mmol/L 24.3 25.0 23.8 24.5   BUN mg/dL 10 17 10 27*   CREATININE mg/dL 2.08* 2.76* 2.12* 3.40*   GLUCOSE mg/dL 153* 174* 237* 233*   Estimated Creatinine Clearance: 26.7 mL/min (A) (by C-G formula based on SCr of 2.08 mg/dL (H)).  Results from last 7 days   Lab Units 22  0607  05/11/22  0509 05/10/22  0547 05/09/22  0621   ALBUMIN g/dL 3.10* 3.10* 2.80* 2.80*     Results from last 7 days   Lab Units 05/12/22  0607 05/11/22  0509 05/10/22  0547 05/09/22  0621   CALCIUM mg/dL 8.4* 8.1* 8.2* 7.6*   ALBUMIN g/dL 3.10* 3.10* 2.80* 2.80*   MAGNESIUM mg/dL 1.9  --   --   --    PHOSPHORUS mg/dL 2.9 3.6 1.7* 3.0       COVID19   Date Value Ref Range Status   05/09/2022 Not Detected Not Detected - Ref. Range Final   05/02/2022 Not Detected Not Detected - Ref. Range Final     Glucose   Date/Time Value Ref Range Status   05/12/2022 0557 163 (H) 70 - 130 mg/dL Final     Comment:     Meter: [id number] : [id number] Ruthie Kirstin NA   05/12/2022 0343 74 70 - 130 mg/dL Final     Comment:     Meter: [id number] : [id number] Iglesiasjaince Jain    05/12/2022 0314 61 (L) 70 - 130 mg/dL Final     Comment:     Meter: [id number] : [id number] [facility] Kirstin NA   05/12/2022 0248 49 (C) 70 - 130 mg/dL Final     Comment:     RN Notified R and V Meter: [id number] : [id number] [facility] Kirstin NA   05/12/2022 0028 173 (H) 70 - 130 mg/dL Final     Comment:     Meter: [id number] : [id number] Ruthie Kirstin NA   05/11/2022 2108 189 (H) 70 - 130 mg/dL Final     Comment:     Meter: [id number] : [id number] [facility] Kirstin NA   05/11/2022 1750 174 (H) 70 - 130 mg/dL Final     Comment:     Meter: [id number] : [id number] Jose SAAVEDRA       Adult Transesophageal Echo (ANJEL) W/ Cont if Necessary Per Protocol  · Estimated left ventricular EF = 65% Left ventricular systolic function   is normal.  · Left ventricular diastolic function was not assessed.  · Saline test results are negative.  · Estimated right ventricular systolic pressure from tricuspid   regurgitation is normal (<35 mmHg).  · No valvular vegetations seen.       Scheduled Medications  amLODIPine, 10 mg, Oral, Q24H  carvedilol, 3.125 mg, Oral, BID With Meals  heparin (porcine), 3,000 Units, Intracatheter, Once  hydrALAZINE, 100 mg, Oral, Q8H  insulin lispro,  0-14 Units, Subcutaneous, Q6H  levothyroxine, 125 mcg, Oral, Q AM  pantoprazole, 40 mg, Intravenous, Q AM  rOPINIRole, 0.75 mg, Oral, Nightly  sertraline, 100 mg, Oral, Daily  sodium chloride, 10 mL, Intravenous, Q12H  tamsulosin, 0.4 mg, Oral, Daily  vancomycin, 500 mg, Intravenous, Once per day on Mon Wed Fri  Vancomycin Pharmacy Intermittent/Pulse Dosing, , Does not apply, Daily    Infusions  Pharmacy to dose vancomycin,     Diet  Diet Regular; Thin; Consistent Carbohydrate, Renal       Assessment/Plan     Active Hospital Problems    Diagnosis  POA   • **Encephalopathy, toxic [G92.9]  Unknown   • Acute CVA (cerebrovascular accident) (HCC) [I63.9]  Unknown   • Intracranial hemorrhage (HCC) [I62.9]  Unknown   • Acute metabolic encephalopathy [G93.41]  Yes   • Leukocytosis [D72.829]  Yes   • Acute on chronic diastolic CHF (congestive heart failure) ([facility]) [I50.33]  Yes   • CAD (coronary artery disease) [I25.10]  Yes   • ESRD (end stage renal disease) ([facility]) [N18.6]  Yes   • Hypertensive disorder [I10]  Yes   • Acute DM type 2 causing complication (HCC) [E11.8]  Yes      Resolved Hospital Problems   No resolved problems to display.       56-year-old female with past medical history of CAD, type 2 diabetes mellitus, rheumatoid arthritis, hypertension, hyperlipidemia, diastolic heart failure, ESRD on dialysis, presents to the hospital with confusion, was found to have potassium of 6.7(missed 2 dialysis sessions prior to admission).  Hospital course was complicated by bacteremia and tunneled dialysis catheter infection.    Hemodialysis catheter related infection with MRSA, now removed:  t  unneled dialysis catheter was draining purulent material.   Vascular surgery consulted, tunneled dialysis catheter removed 04/28.  Culture of the tip came back positive for MRSA.    Blood cultures have been negative.    ID consulted.  On IV vancomycin.    ANJEL negative for vegetation.   Plan for 4 weeks of antibiotics in the setting of  hemodialysis catheter associated infection with end date planned for May 26/22.    Patient will need weekly CBC with differential, creatinine, and vancomycin level while on therapy.    Hypoglycemia: Blood glucose dropped overnight 05/11.  Discontinue Lantus: Continue sliding scale insulin.    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  Neurosurgery evaluated, no interventions indicated.  Patient has been stable.  Repeat CT has been stable.  Neurology signed off.  Continue to hold antiplatelet/anticoagulation per neuro recommendations.  Patient needs repeat MRI in 3 months.  SBP goal less than 160.    Acute on chronic diastolic heart failure: Volume status improved with dialysis.  Euvolemic: Nephrology following.  ESRD with hyperkalemia: Potassium was 6.7 on admission, patient needs to dialysis prior to admission.  Nephrology following, managing dialysis.  Hypertension: Continue carvedilol, amlodipine, hydralazine.,  And as needed IV hydralazine for SBP above 160.    Anemia of chronic disease: On long-acting ANDRAE as an outpatient.  Hemoglobin stable.  Monitor and transfuse as indicated.  DM type II: Discontinue Lantus 05/12., sliding scale insulin hypoglycemic protocol.  Hypothyroidism: Continue levothyroxine  CAD: Continue to hold antiplatelet secondary to the intracranial hemorrhage.  Restart once cleared by neurology.    · SCDs for DVT prophylaxis.  · Full code.  · Discussed with patient, RN   · Anticipate discharge; acute rehab pending bed availability and pre-CERT.  Medically stable to be discharged.          I wore protective equipment throughout call was complicated by bacteremia patient encounter including a face mask, gloves and protective eyewear.  Hand hygiene was performed before donning protective equipment and after removal when leaving the room.      Dictated utilizing Dragon dictation        Albert Templeton MD  [facility]  05/12/22  10:09 EDT    \

## 2022-05-11 NOTE — CASE MANAGEMENT/SOCIAL WORK
Continued Stay Note  [facility]     Patient Name: Zaina Martinez  MRN: [id number]  Today's Date: 5/11/2022    Admit Date: 4/27/2022     Discharge Plan     Row Name 05/11/22 1528       Plan    Plan Acute rehab referral pending- would need [place] pre-cert    Plan Comments Spoke with [facility] x2 regarding referral outcome. She is awaiting further discussion with Dr. Florentino for determination. CCP called pts spouse and left  requesting subacute rehab choice if acute rehab is unable to accept (Pt would also need [place] pre-cert). Await call back from spouse. Gaye REYES/CCP               Discharge Codes    No documentation.               Expected Discharge Date and Time     Expected Discharge Date Expected Discharge Time    May 11, 2022             Guillermina Duffy RN

## 2022-05-11 NOTE — PROGRESS NOTES
"    Patient Name: Zaina Martinez  :1965  56 y.o.      Patient Care Team:  Karson Oreilly MD as PCP - General (Family Medicine)    Chief Complaint: MRSA hemodialysis catheter infection    Interval History: ANJEL performed- normal study       Objective   Vital Signs  Temp:  [97.3 °F (36.3 °C)-97.9 °F (36.6 °C)] 97.3 °F (36.3 °C)  Heart Rate:  [60-85] 60  Resp:  [16-18] 16  BP: (142-184)/(72-84) 177/82  No intake or output data in the 24 hours ending 22 0807  Flowsheet Rows    Flowsheet Row First Filed Value   Admission Height 157.5 cm (62\") Documented at 2022   Admission Weight 54.8 kg (120 lb 13 oz) Documented at 2022 0427          Physical Exam:   General Appearance:   Lethargic, chronically ill-appearing   Lungs:     Clear to auscultation.  Normal respiratory effort and rate.      Heart:    Regular rhythm and normal rate, normal S1 and S2, no murmurs, gallops or rubs.     Chest Wall:    No abnormalities observed   Abdomen:     Soft, nontender, positive bowel sounds.     Extremities:   no cyanosis, clubbing or edema.  No marked joint deformities.        Results Review:    Results from last 7 days   Lab Units 22  0509   SODIUM mmol/L 135*   POTASSIUM mmol/L 4.5   CHLORIDE mmol/L 99   CO2 mmol/L 25.0   BUN mg/dL 17   CREATININE mg/dL 2.76*   GLUCOSE mg/dL 174*   CALCIUM mg/dL 8.1*     Results from last 7 days   Lab Units 22  0557   CK TOTAL U/L 97     Results from last 7 days   Lab Units 22  0509   WBC 10*3/mm3 8.40   HEMOGLOBIN g/dL 9.3*   HEMATOCRIT % 28.0*   PLATELETS 10*3/mm3 250                           Medication Review:   amLODIPine, 10 mg, Oral, Q24H  carvedilol, 3.125 mg, Oral, BID With Meals  heparin (porcine), 3,000 Units, Intracatheter, Once  hydrALAZINE, 100 mg, Oral, Q8H  insulin glargine, 5 Units, Subcutaneous, Nightly  insulin lispro, 0-14 Units, Subcutaneous, Q6H  levothyroxine, 125 mcg, Oral, Q AM  pantoprazole, 40 mg, Intravenous, Q AM  rOPINIRole, " 0.75 mg, Oral, Nightly  sertraline, 100 mg, Oral, Daily  sodium chloride, 10 mL, Intravenous, Q12H  tamsulosin, 0.4 mg, Oral, Daily  vancomycin, 500 mg, Intravenous, Once per day on Mon Wed Fri  Vancomycin Pharmacy Intermittent/Pulse Dosing, , Does not apply, Daily         Pharmacy to dose vancomycin,         Assessment/Plan   Active Hospital Problems    Diagnosis  POA   • **Encephalopathy, toxic [G92.9]  Unknown   • Acute CVA (cerebrovascular accident) ([facility]) [I63.9]  Unknown   • Intracranial hemorrhage (HCC) [I62.9]  Unknown   • Acute metabolic encephalopathy [G93.41]  Yes   • Leukocytosis [D72.829]  Yes   • Acute on chronic diastolic CHF (congestive heart failure) ([facility]) [I50.33]  Yes   • CAD (coronary artery disease) [I25.10]  Yes   • ESRD (end stage renal disease) ([facility]) [N18.6]  Yes   • Hypertensive disorder [I10]  Yes   • Acute DM type 2 causing complication ([facility]) [E11.8]  Yes      Resolved Hospital Problems   No resolved problems to display.     1.  MRSA hemodialysis catheter infection   2.  Left frontal intracerebral hemorrhage status post lytic therapy  3.  End-stage renal disease, on hemodialysis  4.  Altered mental status, multifactorial  5.  Acute on chronic diastolic CHF  6.  Pulmonary hypertension  7.  Reported history of coronary artery disease  8.  COVID pneumonia in April 2022  9.  Rheumatoid arthritis    ANJEL performed, no vegetations seen, no CSOE.    Will sign off, call if we can help in any way    Ghulam Shook III, MD  [facility]  05/11/22  08:07 EDT

## 2022-05-11 NOTE — NURSING NOTE
HD WITHOUT INCIDENT OR C/O. TOLERATED WELL. REMOVED 2 L. NO MEDS ADMINISTERED. DRSG CURRENT, CDI WITH BIOPATCH.  STABLE, NO C/O UPON COMPLETION OF TREATMENT.

## 2022-05-11 NOTE — PLAN OF CARE
Goal Outcome Evaluation:  Plan of Care Reviewed With: patient           Outcome Evaluation: Pt participated with PT this AM. Pt ambulated 30' with cga/min A and HHA. Appears mildly unsteady but no overt loss of balance. Treatment cut short due to arrival of transport to take pt to dialysis. PT will continue to follow and progress as able.

## 2022-05-12 LAB
ALBUMIN SERPL-MCNC: 3.1 G/DL (ref 3.5–5.2)
ANION GAP SERPL CALCULATED.3IONS-SCNC: 7.7 MMOL/L (ref 5–15)
BASOPHILS # BLD AUTO: 0.05 10*3/MM3 (ref 0–0.2)
BASOPHILS NFR BLD AUTO: 0.7 % (ref 0–1.5)
BUN SERPL-MCNC: 10 MG/DL (ref 6–20)
BUN/CREAT SERPL: 4.8 (ref 7–25)
CALCIUM SPEC-SCNC: 8.4 MG/DL (ref 8.6–10.5)
CHLORIDE SERPL-SCNC: 99 MMOL/L (ref 98–107)
CO2 SERPL-SCNC: 24.3 MMOL/L (ref 22–29)
CREAT SERPL-MCNC: 2.08 MG/DL (ref 0.57–1)
DEPRECATED RDW RBC AUTO: 51.3 FL (ref 37–54)
EGFRCR SERPLBLD CKD-EPI 2021: 27.5 ML/MIN/1.73
EOSINOPHIL # BLD AUTO: 0.14 10*3/MM3 (ref 0–0.4)
EOSINOPHIL NFR BLD AUTO: 1.9 % (ref 0.3–6.2)
ERYTHROCYTE [DISTWIDTH] IN BLOOD BY AUTOMATED COUNT: 17.1 % (ref 12.3–15.4)
GLUCOSE BLDC GLUCOMTR-MCNC: 109 MG/DL (ref 70–130)
GLUCOSE BLDC GLUCOMTR-MCNC: 163 MG/DL (ref 70–130)
GLUCOSE BLDC GLUCOMTR-MCNC: 173 MG/DL (ref 70–130)
GLUCOSE BLDC GLUCOMTR-MCNC: 207 MG/DL (ref 70–130)
GLUCOSE BLDC GLUCOMTR-MCNC: 49 MG/DL (ref 70–130)
GLUCOSE BLDC GLUCOMTR-MCNC: 61 MG/DL (ref 70–130)
GLUCOSE BLDC GLUCOMTR-MCNC: 74 MG/DL (ref 70–130)
GLUCOSE BLDC GLUCOMTR-MCNC: 85 MG/DL (ref 70–130)
GLUCOSE SERPL-MCNC: 153 MG/DL (ref 65–99)
HCT VFR BLD AUTO: 29.5 % (ref 34–46.6)
HGB BLD-MCNC: 9.7 G/DL (ref 12–15.9)
IMM GRANULOCYTES # BLD AUTO: 0.1 10*3/MM3 (ref 0–0.05)
IMM GRANULOCYTES NFR BLD AUTO: 1.3 % (ref 0–0.5)
LYMPHOCYTES # BLD AUTO: 1.29 10*3/MM3 (ref 0.7–3.1)
LYMPHOCYTES NFR BLD AUTO: 17.1 % (ref 19.6–45.3)
MAGNESIUM SERPL-MCNC: 1.9 MG/DL (ref 1.6–2.6)
MCH RBC QN AUTO: 27.2 PG (ref 26.6–33)
MCHC RBC AUTO-ENTMCNC: 32.9 G/DL (ref 31.5–35.7)
MCV RBC AUTO: 82.6 FL (ref 79–97)
MONOCYTES # BLD AUTO: 0.61 10*3/MM3 (ref 0.1–0.9)
MONOCYTES NFR BLD AUTO: 8.1 % (ref 5–12)
NEUTROPHILS NFR BLD AUTO: 5.35 10*3/MM3 (ref 1.7–7)
NEUTROPHILS NFR BLD AUTO: 70.9 % (ref 42.7–76)
NRBC BLD AUTO-RTO: 0 /100 WBC (ref 0–0.2)
PHOSPHATE SERPL-MCNC: 2.9 MG/DL (ref 2.5–4.5)
PLATELET # BLD AUTO: 212 10*3/MM3 (ref 140–450)
PMV BLD AUTO: 9.4 FL (ref 6–12)
POTASSIUM SERPL-SCNC: 3.6 MMOL/L (ref 3.5–5.2)
RBC # BLD AUTO: 3.57 10*6/MM3 (ref 3.77–5.28)
SODIUM SERPL-SCNC: 131 MMOL/L (ref 136–145)
WBC NRBC COR # BLD: 7.54 10*3/MM3 (ref 3.4–10.8)

## 2022-05-12 PROCEDURE — 85025 COMPLETE CBC W/AUTO DIFF WBC: CPT | Performed by: STUDENT IN AN ORGANIZED HEALTH CARE EDUCATION/TRAINING PROGRAM

## 2022-05-12 PROCEDURE — 82962 GLUCOSE BLOOD TEST: CPT

## 2022-05-12 PROCEDURE — 63710000001 INSULIN LISPRO (HUMAN) PER 5 UNITS: Performed by: INTERNAL MEDICINE

## 2022-05-12 PROCEDURE — 80069 RENAL FUNCTION PANEL: CPT | Performed by: INTERNAL MEDICINE

## 2022-05-12 PROCEDURE — 83735 ASSAY OF MAGNESIUM: CPT | Performed by: STUDENT IN AN ORGANIZED HEALTH CARE EDUCATION/TRAINING PROGRAM

## 2022-05-12 RX ORDER — ALBUMIN (HUMAN) 12.5 G/50ML
12.5 SOLUTION INTRAVENOUS AS NEEDED
Status: CANCELLED | OUTPATIENT
Start: 2022-05-13 | End: 2022-05-13

## 2022-05-12 RX ORDER — PANTOPRAZOLE SODIUM 40 MG/1
40 TABLET, DELAYED RELEASE ORAL
Status: DISCONTINUED | OUTPATIENT
Start: 2022-05-13 | End: 2022-05-13 | Stop reason: HOSPADM

## 2022-05-12 RX ADMIN — AMLODIPINE BESYLATE 10 MG: 10 TABLET ORAL at 08:09

## 2022-05-12 RX ADMIN — INSULIN LISPRO 3 UNITS: 100 INJECTION, SOLUTION INTRAVENOUS; SUBCUTANEOUS at 08:09

## 2022-05-12 RX ADMIN — Medication 10 ML: at 21:37

## 2022-05-12 RX ADMIN — DIPHENOXYLATE HYDROCHLORIDE AND ATROPINE SULFATE 1 TABLET: 2.5; .025 TABLET ORAL at 08:09

## 2022-05-12 RX ADMIN — HYDRALAZINE HYDROCHLORIDE 100 MG: 50 TABLET ORAL at 05:08

## 2022-05-12 RX ADMIN — CARVEDILOL 3.12 MG: 3.12 TABLET, FILM COATED ORAL at 18:21

## 2022-05-12 RX ADMIN — ROPINIROLE HYDROCHLORIDE 0.75 MG: 0.5 TABLET, FILM COATED ORAL at 21:37

## 2022-05-12 RX ADMIN — INSULIN LISPRO 3 UNITS: 100 INJECTION, SOLUTION INTRAVENOUS; SUBCUTANEOUS at 00:38

## 2022-05-12 RX ADMIN — TAMSULOSIN HYDROCHLORIDE 0.4 MG: 0.4 CAPSULE ORAL at 08:09

## 2022-05-12 RX ADMIN — LEVOTHYROXINE SODIUM 125 MCG: 0.12 TABLET ORAL at 05:08

## 2022-05-12 RX ADMIN — HYDRALAZINE HYDROCHLORIDE 100 MG: 50 TABLET ORAL at 14:29

## 2022-05-12 RX ADMIN — HYDRALAZINE HYDROCHLORIDE 100 MG: 50 TABLET ORAL at 21:36

## 2022-05-12 RX ADMIN — Medication 3 MG: at 21:37

## 2022-05-12 RX ADMIN — Medication 15 G: at 03:17

## 2022-05-12 RX ADMIN — SERTRALINE 100 MG: 100 TABLET, FILM COATED ORAL at 08:09

## 2022-05-12 RX ADMIN — PANTOPRAZOLE SODIUM 40 MG: 40 INJECTION, POWDER, FOR SOLUTION INTRAVENOUS at 05:11

## 2022-05-12 RX ADMIN — Medication 10 ML: at 08:10

## 2022-05-12 RX ADMIN — CARVEDILOL 3.12 MG: 3.12 TABLET, FILM COATED ORAL at 08:09

## 2022-05-12 NOTE — PLAN OF CARE
Goal Outcome Evaluation:  Plan of Care Reviewed With: patient        Progress: no change  Outcome Evaluation: Pt. A/Ox4,slow to respond to questions, VSS on RA, ambulates to bathroom with assistX1, incontinent of bowel this shift, BG dropped down to 49 at 0248, Pt still A/O, glucose given and peanut butter on crackers and OJ also given, will continue to monitor

## 2022-05-12 NOTE — CASE MANAGEMENT/SOCIAL WORK
Continued Stay Note  [facility]     Patient Name: Zaina Martinez  MRN: [id number]  Today's Date: 5/12/2022    Admit Date: 4/27/2022     Discharge Plan     Row Name 05/12/22 0937       Plan    Plan [facility] pending [facility] pre-cert    Patient/Family in Agreement with Plan yes    Plan Comments Recieved vm from pts spouse and they are agreeable for [facility] acute rehab. Awaiting pre-cert. CCP to follow up with [facility] tomorrow. vamsi oscar/ccp               Discharge Codes    No documentation.               Expected Discharge Date and Time     Expected Discharge Date Expected Discharge Time    May 13, 2022             Guillermina Duffy, RN

## 2022-05-12 NOTE — PLAN OF CARE
Goal Outcome Evaluation:              Outcome Evaluation: Pt A&Ox4 and VSS on RA. Up to bathroom with assist x1. No c/o pain. Psych consult per fam request r/t pt's increasing depression. Plan is D/C to inpatient rehab, pending precert. Contact isolation maintained. Safety measures in place. Will CTM the rest of my shift.

## 2022-05-12 NOTE — PROGRESS NOTES
[facility] Progress Note      Patient Name: Zaina Martinez  : 1965  MRN: [id number]  Primary Care Physician:  Karson Oreilly MD  Date of admission: 2022    Subjective     Interval History:   Follow up ESRD  Her headache has resolved, denies any chest pain or shortness of breath, no orthopnea or PND, no nausea or vomiting, no abdominal pain.  She had dialysis yesterday without any difficulty    Objective     Vitals:   Temp:  [97.8 °F (36.6 °C)-98.7 °F (37.1 °C)] 98.1 °F (36.7 °C)  Heart Rate:  [58-80] 70  Resp:  [14-16] 16  BP: (134-203)/(65-96) 146/65    Intake/Output Summary (Last 24 hours) at 2022 0753  Last data filed at 2022 2119  Gross per 24 hour   Intake 450 ml   Output 2000 ml   Net -1550 ml       Physical Exam:   General: Awake, chronically ill, blunted but still oriented  Skin:dry. No rash  HEENT: Nonicteric sclerae, oral mucosa is normal  Neck:  RIJ TDC, no JVD  Lungs:  clear to auscultation. Unlabored.   Heart: RRR no s3 or rub  Abdomen: Normoactive bowel, soft, nontender, ND   Extremities: no edema.    Neuro: Normal speech, moving all extremities    Scheduled Meds:     amLODIPine, 10 mg, Oral, Q24H  carvedilol, 3.125 mg, Oral, BID With Meals  heparin (porcine), 3,000 Units, Intracatheter, Once  hydrALAZINE, 100 mg, Oral, Q8H  insulin lispro, 0-14 Units, Subcutaneous, Q6H  levothyroxine, 125 mcg, Oral, Q AM  pantoprazole, 40 mg, Intravenous, Q AM  rOPINIRole, 0.75 mg, Oral, Nightly  sertraline, 100 mg, Oral, Daily  sodium chloride, 10 mL, Intravenous, Q12H  tamsulosin, 0.4 mg, Oral, Daily  vancomycin, 500 mg, Intravenous, Once per day on   Vancomycin Pharmacy Intermittent/Pulse Dosing, , Does not apply, Daily      IV Meds:   Pharmacy to dose vancomycin,         Results Reviewed:   I have personally reviewed the results from the time of this admission to 2022 07:53 EDT     Results from last 7 days   Lab Units 22  0607  05/11/22  0509 05/10/22  0547   SODIUM mmol/L 131* 135* 136   POTASSIUM mmol/L 3.6 4.5 3.8   CHLORIDE mmol/L 99 99 102   CO2 mmol/L 24.3 25.0 23.8   BUN mg/dL 10 17 10   CREATININE mg/dL 2.08* 2.76* 2.12*   CALCIUM mg/dL 8.4* 8.1* 8.2*   GLUCOSE mg/dL 153* 174* 237*       Estimated Creatinine Clearance: 26.7 mL/min (A) (by C-G formula based on SCr of 2.08 mg/dL (H)).    Results from last 7 days   Lab Units 05/12/22  0607 05/11/22  0509 05/10/22  0547   MAGNESIUM mg/dL 1.9  --   --    PHOSPHORUS mg/dL 2.9 3.6 1.7*             Results from last 7 days   Lab Units 05/12/22  0607 05/11/22  0509 05/10/22  0547 05/09/22  0621 05/08/22  0510   WBC 10*3/mm3 7.54 8.40 8.42 8.41 8.23   HEMOGLOBIN g/dL 9.7* 9.3* 9.1* 9.4* 10.1*   PLATELETS 10*3/mm3 212 250 271 282 335             Assessment / Plan     ASSESSMENT:  1.  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   Electrolytes, appears to be euvolemic, electrolyte within acceptable range, except sodium is 131  2.  Intracerebral bleed and altered mental status.  Mental status improved significantly.   3.  Infected TDC, s/p removal 5/1. Staph aureus .  Treated and followed by ID   4.  DM2    5.  Coronary artery disease  6.  Acute on chronic diastolic dysfunction .  Volume status is improved significantly  7.  Anemia of CKD, on long-acting ANDRAE as an outpatient.  Hemoglobin today 9.7  8.  HTN, not well controlled  9.  Hypophosphatemia, phosphorus today is two-point    PLAN:  1.  Hemodialysis tomorrow  2.  Continue the same treatment  3.  Surveillance labs      Lei Covington MD  05/12/22  07:53 EDT    [facility]  [phone]

## 2022-05-13 ENCOUNTER — HOSPITAL ENCOUNTER (INPATIENT)
Facility: HOSPITAL | Age: 57
LOS: 70 days | Discharge: NURSING, REHAB OR LONG-TERM CARE | End: 2022-07-22
Attending: PHYSICAL MEDICINE & REHABILITATION | Admitting: PHYSICAL MEDICINE & REHABILITATION

## 2022-05-13 VITALS
DIASTOLIC BLOOD PRESSURE: 83 MMHG | HEART RATE: 81 BPM | TEMPERATURE: 98 F | SYSTOLIC BLOOD PRESSURE: 170 MMHG | BODY MASS INDEX: 20.69 KG/M2 | OXYGEN SATURATION: 95 % | WEIGHT: 112.43 LBS | HEIGHT: 62 IN | RESPIRATION RATE: 16 BRPM

## 2022-05-13 DIAGNOSIS — E11.69: ICD-10-CM

## 2022-05-13 DIAGNOSIS — M62.20: ICD-10-CM

## 2022-05-13 DIAGNOSIS — G47.09 OTHER INSOMNIA: ICD-10-CM

## 2022-05-13 DIAGNOSIS — R53.1 GENERALIZED WEAKNESS: Primary | ICD-10-CM

## 2022-05-13 PROBLEM — I63.9 STROKE: Status: ACTIVE | Noted: 2022-05-13

## 2022-05-13 LAB
ALBUMIN SERPL-MCNC: 2.8 G/DL (ref 3.5–5.2)
ANION GAP SERPL CALCULATED.3IONS-SCNC: 12 MMOL/L (ref 5–15)
BASOPHILS # BLD AUTO: 0.05 10*3/MM3 (ref 0–0.2)
BASOPHILS NFR BLD AUTO: 0.6 % (ref 0–1.5)
BUN SERPL-MCNC: 19 MG/DL (ref 6–20)
BUN/CREAT SERPL: 6.5 (ref 7–25)
CALCIUM SPEC-SCNC: 7.9 MG/DL (ref 8.6–10.5)
CHLORIDE SERPL-SCNC: 99 MMOL/L (ref 98–107)
CO2 SERPL-SCNC: 22 MMOL/L (ref 22–29)
CREAT SERPL-MCNC: 2.94 MG/DL (ref 0.57–1)
DEPRECATED RDW RBC AUTO: 54.9 FL (ref 37–54)
EGFRCR SERPLBLD CKD-EPI 2021: 18.2 ML/MIN/1.73
EOSINOPHIL # BLD AUTO: 0.13 10*3/MM3 (ref 0–0.4)
EOSINOPHIL NFR BLD AUTO: 1.5 % (ref 0.3–6.2)
ERYTHROCYTE [DISTWIDTH] IN BLOOD BY AUTOMATED COUNT: 17.1 % (ref 12.3–15.4)
GLUCOSE BLDC GLUCOMTR-MCNC: 103 MG/DL (ref 70–130)
GLUCOSE BLDC GLUCOMTR-MCNC: 209 MG/DL (ref 70–130)
GLUCOSE BLDC GLUCOMTR-MCNC: 264 MG/DL (ref 70–130)
GLUCOSE SERPL-MCNC: 200 MG/DL (ref 65–99)
HCT VFR BLD AUTO: 28.8 % (ref 34–46.6)
HGB BLD-MCNC: 9 G/DL (ref 12–15.9)
IMM GRANULOCYTES # BLD AUTO: 0.07 10*3/MM3 (ref 0–0.05)
IMM GRANULOCYTES NFR BLD AUTO: 0.8 % (ref 0–0.5)
LYMPHOCYTES # BLD AUTO: 1.59 10*3/MM3 (ref 0.7–3.1)
LYMPHOCYTES NFR BLD AUTO: 18.3 % (ref 19.6–45.3)
MAGNESIUM SERPL-MCNC: 1.9 MG/DL (ref 1.6–2.6)
MCH RBC QN AUTO: 26.9 PG (ref 26.6–33)
MCHC RBC AUTO-ENTMCNC: 31.3 G/DL (ref 31.5–35.7)
MCV RBC AUTO: 86.2 FL (ref 79–97)
MONOCYTES # BLD AUTO: 0.65 10*3/MM3 (ref 0.1–0.9)
MONOCYTES NFR BLD AUTO: 7.5 % (ref 5–12)
NEUTROPHILS NFR BLD AUTO: 6.18 10*3/MM3 (ref 1.7–7)
NEUTROPHILS NFR BLD AUTO: 71.3 % (ref 42.7–76)
NRBC BLD AUTO-RTO: 0 /100 WBC (ref 0–0.2)
PHOSPHATE SERPL-MCNC: 3.8 MG/DL (ref 2.5–4.5)
PLATELET # BLD AUTO: 174 10*3/MM3 (ref 140–450)
PMV BLD AUTO: 9.3 FL (ref 6–12)
POTASSIUM SERPL-SCNC: 4.4 MMOL/L (ref 3.5–5.2)
RBC # BLD AUTO: 3.34 10*6/MM3 (ref 3.77–5.28)
SARS-COV-2 RNA RESP QL NAA+PROBE: NOT DETECTED
SODIUM SERPL-SCNC: 133 MMOL/L (ref 136–145)
WBC NRBC COR # BLD: 8.67 10*3/MM3 (ref 3.4–10.8)

## 2022-05-13 PROCEDURE — 83735 ASSAY OF MAGNESIUM: CPT | Performed by: STUDENT IN AN ORGANIZED HEALTH CARE EDUCATION/TRAINING PROGRAM

## 2022-05-13 PROCEDURE — 25010000002 HEPARIN (PORCINE) PER 1000 UNITS: Performed by: INTERNAL MEDICINE

## 2022-05-13 PROCEDURE — 82962 GLUCOSE BLOOD TEST: CPT

## 2022-05-13 PROCEDURE — 5A1D70Z PERFORMANCE OF URINARY FILTRATION, INTERMITTENT, LESS THAN 6 HOURS PER DAY: ICD-10-PCS | Performed by: STUDENT IN AN ORGANIZED HEALTH CARE EDUCATION/TRAINING PROGRAM

## 2022-05-13 PROCEDURE — 63710000001 INSULIN LISPRO (HUMAN) PER 5 UNITS: Performed by: INTERNAL MEDICINE

## 2022-05-13 PROCEDURE — 25010000002 VANCOMYCIN PER 500 MG: Performed by: STUDENT IN AN ORGANIZED HEALTH CARE EDUCATION/TRAINING PROGRAM

## 2022-05-13 PROCEDURE — U0003 INFECTIOUS AGENT DETECTION BY NUCLEIC ACID (DNA OR RNA); SEVERE ACUTE RESPIRATORY SYNDROME CORONAVIRUS 2 (SARS-COV-2) (CORONAVIRUS DISEASE [COVID-19]), AMPLIFIED PROBE TECHNIQUE, MAKING USE OF HIGH THROUGHPUT TECHNOLOGIES AS DESCRIBED BY CMS-2020-01-R: HCPCS | Performed by: STUDENT IN AN ORGANIZED HEALTH CARE EDUCATION/TRAINING PROGRAM

## 2022-05-13 PROCEDURE — 85025 COMPLETE CBC W/AUTO DIFF WBC: CPT | Performed by: INTERNAL MEDICINE

## 2022-05-13 PROCEDURE — 80069 RENAL FUNCTION PANEL: CPT | Performed by: INTERNAL MEDICINE

## 2022-05-13 RX ORDER — HEPARIN SODIUM 1000 [USP'U]/ML
3800 INJECTION, SOLUTION INTRAVENOUS; SUBCUTANEOUS AS NEEDED
Status: DISCONTINUED | OUTPATIENT
Start: 2022-05-13 | End: 2022-07-22 | Stop reason: HOSPADM

## 2022-05-13 RX ORDER — INSULIN LISPRO 100 [IU]/ML
0-14 INJECTION, SOLUTION INTRAVENOUS; SUBCUTANEOUS EVERY 6 HOURS
Status: CANCELLED | OUTPATIENT
Start: 2022-05-13

## 2022-05-13 RX ORDER — SERTRALINE HYDROCHLORIDE 100 MG/1
100 TABLET, FILM COATED ORAL DAILY
Status: CANCELLED | OUTPATIENT
Start: 2022-05-14

## 2022-05-13 RX ORDER — HEPARIN SODIUM 1000 [USP'U]/ML
3800 INJECTION, SOLUTION INTRAVENOUS; SUBCUTANEOUS AS NEEDED
Status: CANCELLED | OUTPATIENT
Start: 2022-05-13

## 2022-05-13 RX ORDER — DEXTROSE MONOHYDRATE 25 G/50ML
25 INJECTION, SOLUTION INTRAVENOUS
Status: CANCELLED | OUTPATIENT
Start: 2022-05-13

## 2022-05-13 RX ORDER — INSULIN LISPRO 100 [IU]/ML
0-14 INJECTION, SOLUTION INTRAVENOUS; SUBCUTANEOUS
Status: DISCONTINUED | OUTPATIENT
Start: 2022-05-13 | End: 2022-05-24

## 2022-05-13 RX ORDER — HYDRALAZINE HYDROCHLORIDE 20 MG/ML
10 INJECTION INTRAMUSCULAR; INTRAVENOUS EVERY 4 HOURS PRN
Status: CANCELLED | OUTPATIENT
Start: 2022-05-13

## 2022-05-13 RX ORDER — UREA 10 %
3 LOTION (ML) TOPICAL NIGHTLY PRN
Status: CANCELLED | OUTPATIENT
Start: 2022-05-13

## 2022-05-13 RX ORDER — ACETAMINOPHEN 650 MG/1
650 SUPPOSITORY RECTAL EVERY 4 HOURS PRN
Status: DISCONTINUED | OUTPATIENT
Start: 2022-05-13 | End: 2022-06-01

## 2022-05-13 RX ORDER — INSULIN LISPRO 100 [IU]/ML
0-14 INJECTION, SOLUTION INTRAVENOUS; SUBCUTANEOUS EVERY 6 HOURS
Status: DISCONTINUED | OUTPATIENT
Start: 2022-05-14 | End: 2022-05-13

## 2022-05-13 RX ORDER — SERTRALINE HYDROCHLORIDE 100 MG/1
100 TABLET, FILM COATED ORAL DAILY
Status: DISCONTINUED | OUTPATIENT
Start: 2022-05-14 | End: 2022-06-28

## 2022-05-13 RX ORDER — NITROGLYCERIN 0.4 MG/1
0.4 TABLET SUBLINGUAL
Status: CANCELLED | OUTPATIENT
Start: 2022-05-13

## 2022-05-13 RX ORDER — NICOTINE POLACRILEX 4 MG
15 LOZENGE BUCCAL
Status: CANCELLED | OUTPATIENT
Start: 2022-05-13

## 2022-05-13 RX ORDER — ALBUMIN (HUMAN) 12.5 G/50ML
12.5 SOLUTION INTRAVENOUS AS NEEDED
Status: ACTIVE | OUTPATIENT
Start: 2022-05-13 | End: 2022-05-13

## 2022-05-13 RX ORDER — PANTOPRAZOLE SODIUM 40 MG/1
40 TABLET, DELAYED RELEASE ORAL
Status: DISCONTINUED | OUTPATIENT
Start: 2022-05-14 | End: 2022-07-22 | Stop reason: HOSPADM

## 2022-05-13 RX ORDER — AMLODIPINE BESYLATE 10 MG/1
10 TABLET ORAL
Status: CANCELLED | OUTPATIENT
Start: 2022-05-14

## 2022-05-13 RX ORDER — NITROGLYCERIN 0.4 MG/1
0.4 TABLET SUBLINGUAL
Status: DISCONTINUED | OUTPATIENT
Start: 2022-05-13 | End: 2022-07-22 | Stop reason: HOSPADM

## 2022-05-13 RX ORDER — ONDANSETRON 2 MG/ML
4 INJECTION INTRAMUSCULAR; INTRAVENOUS EVERY 6 HOURS PRN
Status: DISCONTINUED | OUTPATIENT
Start: 2022-05-13 | End: 2022-07-01

## 2022-05-13 RX ORDER — NICOTINE POLACRILEX 4 MG
15 LOZENGE BUCCAL
Status: DISCONTINUED | OUTPATIENT
Start: 2022-05-13 | End: 2022-06-30 | Stop reason: SDUPTHER

## 2022-05-13 RX ORDER — ONDANSETRON 4 MG/1
4 TABLET, FILM COATED ORAL EVERY 6 HOURS PRN
Status: DISCONTINUED | OUTPATIENT
Start: 2022-05-13 | End: 2022-07-01

## 2022-05-13 RX ORDER — ACETAMINOPHEN 325 MG/1
650 TABLET ORAL EVERY 4 HOURS PRN
Status: DISCONTINUED | OUTPATIENT
Start: 2022-05-13 | End: 2022-06-01

## 2022-05-13 RX ORDER — DIPHENOXYLATE HYDROCHLORIDE AND ATROPINE SULFATE 2.5; .025 MG/1; MG/1
1 TABLET ORAL 4 TIMES DAILY PRN
Status: CANCELLED | OUTPATIENT
Start: 2022-05-13

## 2022-05-13 RX ORDER — HYDRALAZINE HYDROCHLORIDE 50 MG/1
100 TABLET, FILM COATED ORAL EVERY 8 HOURS SCHEDULED
Status: DISCONTINUED | OUTPATIENT
Start: 2022-05-13 | End: 2022-05-18

## 2022-05-13 RX ORDER — AMLODIPINE BESYLATE 10 MG/1
10 TABLET ORAL
Status: DISCONTINUED | OUTPATIENT
Start: 2022-05-14 | End: 2022-06-29

## 2022-05-13 RX ORDER — TAMSULOSIN HYDROCHLORIDE 0.4 MG/1
0.4 CAPSULE ORAL DAILY
Status: CANCELLED | OUTPATIENT
Start: 2022-05-14

## 2022-05-13 RX ORDER — SODIUM CHLORIDE 0.9 % (FLUSH) 0.9 %
10 SYRINGE (ML) INJECTION AS NEEDED
Status: CANCELLED | OUTPATIENT
Start: 2022-05-13

## 2022-05-13 RX ORDER — ONDANSETRON 4 MG/1
4 TABLET, FILM COATED ORAL EVERY 6 HOURS PRN
Status: CANCELLED | OUTPATIENT
Start: 2022-05-13

## 2022-05-13 RX ORDER — UREA 10 %
3 LOTION (ML) TOPICAL NIGHTLY PRN
Status: DISCONTINUED | OUTPATIENT
Start: 2022-05-13 | End: 2022-06-28

## 2022-05-13 RX ORDER — ONDANSETRON 2 MG/ML
4 INJECTION INTRAMUSCULAR; INTRAVENOUS EVERY 6 HOURS PRN
Status: CANCELLED | OUTPATIENT
Start: 2022-05-13

## 2022-05-13 RX ORDER — CARVEDILOL 3.12 MG/1
3.12 TABLET ORAL 2 TIMES DAILY WITH MEALS
Status: CANCELLED | OUTPATIENT
Start: 2022-05-13

## 2022-05-13 RX ORDER — ACETAMINOPHEN 650 MG/1
650 SUPPOSITORY RECTAL EVERY 4 HOURS PRN
Status: CANCELLED | OUTPATIENT
Start: 2022-05-13

## 2022-05-13 RX ORDER — CARVEDILOL 3.12 MG/1
3.12 TABLET ORAL 2 TIMES DAILY WITH MEALS
Status: DISCONTINUED | OUTPATIENT
Start: 2022-05-14 | End: 2022-05-17

## 2022-05-13 RX ORDER — HYDRALAZINE HYDROCHLORIDE 50 MG/1
100 TABLET, FILM COATED ORAL EVERY 8 HOURS SCHEDULED
Status: CANCELLED | OUTPATIENT
Start: 2022-05-13

## 2022-05-13 RX ORDER — LEVOTHYROXINE SODIUM 0.12 MG/1
125 TABLET ORAL
Status: CANCELLED | OUTPATIENT
Start: 2022-05-14

## 2022-05-13 RX ORDER — ACETAMINOPHEN 325 MG/1
650 TABLET ORAL EVERY 4 HOURS PRN
Status: CANCELLED | OUTPATIENT
Start: 2022-05-13

## 2022-05-13 RX ORDER — DIPHENOXYLATE HYDROCHLORIDE AND ATROPINE SULFATE 2.5; .025 MG/1; MG/1
1 TABLET ORAL 4 TIMES DAILY PRN
Status: DISCONTINUED | OUTPATIENT
Start: 2022-05-13 | End: 2022-07-01

## 2022-05-13 RX ORDER — ROPINIROLE 0.5 MG/1
0.75 TABLET, FILM COATED ORAL NIGHTLY
Status: CANCELLED | OUTPATIENT
Start: 2022-05-13

## 2022-05-13 RX ORDER — LEVOTHYROXINE SODIUM 0.12 MG/1
125 TABLET ORAL
Status: DISCONTINUED | OUTPATIENT
Start: 2022-05-14 | End: 2022-06-10

## 2022-05-13 RX ORDER — DEXTROSE MONOHYDRATE 25 G/50ML
25 INJECTION, SOLUTION INTRAVENOUS
Status: DISCONTINUED | OUTPATIENT
Start: 2022-05-13 | End: 2022-06-30 | Stop reason: SDUPTHER

## 2022-05-13 RX ORDER — TAMSULOSIN HYDROCHLORIDE 0.4 MG/1
0.4 CAPSULE ORAL DAILY
Status: DISCONTINUED | OUTPATIENT
Start: 2022-05-14 | End: 2022-07-22 | Stop reason: HOSPADM

## 2022-05-13 RX ORDER — HYDRALAZINE HYDROCHLORIDE 20 MG/ML
10 INJECTION INTRAMUSCULAR; INTRAVENOUS EVERY 4 HOURS PRN
Status: DISCONTINUED | OUTPATIENT
Start: 2022-05-13 | End: 2022-06-02

## 2022-05-13 RX ORDER — ROPINIROLE 0.5 MG/1
0.75 TABLET, FILM COATED ORAL NIGHTLY
Status: DISCONTINUED | OUTPATIENT
Start: 2022-05-13 | End: 2022-07-22 | Stop reason: HOSPADM

## 2022-05-13 RX ORDER — SODIUM CHLORIDE 0.9 % (FLUSH) 0.9 %
10 SYRINGE (ML) INJECTION EVERY 12 HOURS SCHEDULED
Status: CANCELLED | OUTPATIENT
Start: 2022-05-13

## 2022-05-13 RX ORDER — PANTOPRAZOLE SODIUM 40 MG/1
40 TABLET, DELAYED RELEASE ORAL
Status: CANCELLED | OUTPATIENT
Start: 2022-05-14

## 2022-05-13 RX ADMIN — Medication 10 ML: at 13:37

## 2022-05-13 RX ADMIN — Medication 3 MG: at 23:50

## 2022-05-13 RX ADMIN — HYDRALAZINE HYDROCHLORIDE 100 MG: 50 TABLET, FILM COATED ORAL at 22:09

## 2022-05-13 RX ADMIN — HYDRALAZINE HYDROCHLORIDE 100 MG: 50 TABLET ORAL at 06:04

## 2022-05-13 RX ADMIN — CARVEDILOL 3.12 MG: 3.12 TABLET, FILM COATED ORAL at 13:36

## 2022-05-13 RX ADMIN — LEVOTHYROXINE SODIUM 125 MCG: 0.12 TABLET ORAL at 06:04

## 2022-05-13 RX ADMIN — ACETAMINOPHEN 325MG 650 MG: 325 TABLET ORAL at 23:50

## 2022-05-13 RX ADMIN — ACETAMINOPHEN 650 MG: 325 TABLET, FILM COATED ORAL at 10:48

## 2022-05-13 RX ADMIN — HEPARIN SODIUM 2000 UNITS: 1000 INJECTION INTRAVENOUS; SUBCUTANEOUS at 10:07

## 2022-05-13 RX ADMIN — AMLODIPINE BESYLATE 10 MG: 10 TABLET ORAL at 13:36

## 2022-05-13 RX ADMIN — ROPINIROLE HYDROCHLORIDE 0.75 MG: 0.5 TABLET, FILM COATED ORAL at 22:09

## 2022-05-13 RX ADMIN — INSULIN LISPRO 8 UNITS: 100 INJECTION, SOLUTION INTRAVENOUS; SUBCUTANEOUS at 18:02

## 2022-05-13 RX ADMIN — HEPARIN SODIUM 3800 UNITS: 1000 INJECTION INTRAVENOUS; SUBCUTANEOUS at 13:16

## 2022-05-13 RX ADMIN — VANCOMYCIN HYDROCHLORIDE 500 MG: 500 INJECTION, POWDER, LYOPHILIZED, FOR SOLUTION INTRAVENOUS at 23:53

## 2022-05-13 RX ADMIN — SERTRALINE 100 MG: 100 TABLET, FILM COATED ORAL at 13:36

## 2022-05-13 RX ADMIN — PANTOPRAZOLE SODIUM 40 MG: 40 TABLET, DELAYED RELEASE ORAL at 06:04

## 2022-05-13 RX ADMIN — CARVEDILOL 3.12 MG: 3.12 TABLET, FILM COATED ORAL at 18:02

## 2022-05-13 RX ADMIN — TAMSULOSIN HYDROCHLORIDE 0.4 MG: 0.4 CAPSULE ORAL at 13:36

## 2022-05-13 RX ADMIN — HYDRALAZINE HYDROCHLORIDE 100 MG: 50 TABLET ORAL at 13:36

## 2022-05-13 NOTE — PROGRESS NOTES
[facility] Acute Re[facility]  Received precert from [facility]. A bed is ready today to move to Rehab. Spoke with AMY Marshall to inform. Pt will need a Covid test (resulted negative), a DC/ Readmit, along with a DC Summary prior to move to rehab.     Rolando will call [facility] to inform.     Silvia Vázquez RN  Acute Rehab Admission Nurse

## 2022-05-13 NOTE — PROGRESS NOTES
[facility]  Continuing to await precert from [facility] Shiv Vázquez RN  Acute Rehab Admission Nurse   No

## 2022-05-13 NOTE — PLAN OF CARE
Goal Outcome Evaluation:  Plan of Care Reviewed With: patient        Progress: improving  Outcome Evaluation: VSS, NIH 0, no c/o of pain or nausea, HD M/W/F, percerts for [facility] started, will CTM

## 2022-05-13 NOTE — CASE MANAGEMENT/SOCIAL WORK
Continued Stay Note  [facility]     Patient Name: Zaina Martinez  MRN: [id number]  Today's Date: 5/13/2022    Admit Date: 4/27/2022     Discharge Plan     Row Name 05/13/22 1211       Plan    Plan [facility] [place] acute rehab pending [facility] pre-cert and negative covid test    Plan Comments Spoke with Silvia/[facility] [place] acute rehab, Bed is available and pt can admit once pre-cert is done and repeat negative covid. Updated MD and RN. Await [facility] pre-cert. vamsi oscar/ccp               Discharge Codes    No documentation.               Expected Discharge Date and Time     Expected Discharge Date Expected Discharge Time    May 13, 2022             Guillermina Duffy, RN

## 2022-05-13 NOTE — PROGRESS NOTES
[facility] Progress Note      Patient Name: Zaina Martinez  : 1965  MRN: [id number]  Primary Care Physician:  Karson Oreilly MD  Date of admission: 2022    Subjective     Interval History:   Follow up ESRD  The patient has been very stoic, her family requested psych consultation patient apparently very depressed, she denies any chest pain or shortness of air, no orthopnea or PND, no nausea or vomiting.    Objective     Vitals:   Temp:  [97.8 °F (36.6 °C)-98.5 °F (36.9 °C)] 98 °F (36.7 °C)  Heart Rate:  [75-86] 81  Resp:  [16-18] 16  BP: (139-170)/(64-83) 170/83    Intake/Output Summary (Last 24 hours) at 2022 0841  Last data filed at 2022 1428  Gross per 24 hour   Intake 170 ml   Output --   Net 170 ml       Physical Exam:   General: Awake, chronically ill, blunted but still oriented  Skin:dry. No rash  HEENT: Nonicteric sclerae, oral mucosa is normal  Neck:  RIJ TDC, no JVD  Lungs:  clear to auscultation. Unlabored.   Heart: RRR no s3 or rub  Abdomen: Normoactive bowel, soft, nontender, ND   Extremities: no edema.    Neuro: Normal speech, moving all extremities    Scheduled Meds:     amLODIPine, 10 mg, Oral, Q24H  carvedilol, 3.125 mg, Oral, BID With Meals  heparin (porcine), 3,000 Units, Intracatheter, Once  hydrALAZINE, 100 mg, Oral, Q8H  insulin lispro, 0-14 Units, Subcutaneous, Q6H  levothyroxine, 125 mcg, Oral, Q AM  pantoprazole, 40 mg, Oral, Q AM  rOPINIRole, 0.75 mg, Oral, Nightly  sertraline, 100 mg, Oral, Daily  sodium chloride, 10 mL, Intravenous, Q12H  tamsulosin, 0.4 mg, Oral, Daily  vancomycin, 500 mg, Intravenous, Once per day on   Vancomycin Pharmacy Intermittent/Pulse Dosing, , Does not apply, Daily      IV Meds:   Pharmacy to dose vancomycin,         Results Reviewed:   I have personally reviewed the results from the time of this admission to 2022 08:41 EDT     Results from last 7 days   Lab Units 22  0534 22  0607  05/11/22  0509   SODIUM mmol/L 133* 131* 135*   POTASSIUM mmol/L 4.4 3.6 4.5   CHLORIDE mmol/L 99 99 99   CO2 mmol/L 22.0 24.3 25.0   BUN mg/dL 19 10 17   CREATININE mg/dL 2.94* 2.08* 2.76*   CALCIUM mg/dL 7.9* 8.4* 8.1*   GLUCOSE mg/dL 200* 153* 174*       Estimated Creatinine Clearance: 18.5 mL/min (A) (by C-G formula based on SCr of 2.94 mg/dL (H)).    Results from last 7 days   Lab Units 05/13/22  0534 05/12/22  0607 05/11/22  0509   MAGNESIUM mg/dL 1.9 1.9  --    PHOSPHORUS mg/dL 3.8 2.9 3.6             Results from last 7 days   Lab Units 05/13/22  0534 05/12/22  0607 05/11/22  0509 05/10/22  0547 05/09/22  0621   WBC 10*3/mm3 8.67 7.54 8.40 8.42 8.41   HEMOGLOBIN g/dL 9.0* 9.7* 9.3* 9.1* 9.4*   PLATELETS 10*3/mm3 174 212 250 271 282             Assessment / Plan     ASSESSMENT:  1.  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   Electrolytes, appears to be euvolemic, electrolyte within acceptable range, except sodium is 133  2.  Intracerebral bleed and altered mental status.  Mental status improved significantly.   3.  Infected TDC, s/p removal 5/1. Staph aureus .  Treated and followed by ID   4.  DM2    5.  Coronary artery disease  6.  Acute on chronic diastolic dysfunction .  Volume status is improved significantly  7.  Anemia of CKD, on long-acting ANDRAE as an outpatient.  Hemoglobin today 9.  8.  HTN, not well controlled  9.  Hypophosphatemia, phosphorus today i3.8    PLAN:  1.  Hemodialysis today  2.  Continue the same treatment  3.  Surveillance labs      Lei Covington MD  05/13/22  08:41 EDT    [facility]  [phone]

## 2022-05-13 NOTE — PROGRESS NOTES
Adult Nutrition  Assessment/PES    Patient Name:  Zaina Martinez  YOB: 1965  MRN: [id number]  Admit Date:  4/27/2022    Assessment Date:  5/13/2022    Comments:  Nutrition f/u:  Pt off of floor in dialysis.   Pt tolerating Consistent CHO/Renal diet well w/ % po intake of meals.  +BM 5/12    Will follow per protocol.     Reason for Assessment     Row Name 05/13/22 1029          Reason for Assessment    Reason For Assessment follow-up protocol                Nutrition/Diet History     Row Name 05/13/22 1029          Nutrition/Diet History    Typical Intake (Food/Fluid/EN/PN) Pt off of floor in dialysis. Pt tolerating Consistent CHO/Renal diet well w/ % po intake of meals. +BM 5/12                Anthropometrics     Row Name 05/13/22 0700          Anthropometrics    Weight 54.7 kg (120 lb 8 oz)                Labs/Tests/Procedures/Meds     Row Name 05/13/22 1031          Labs/Procedures/Meds    Lab Results Comments Na 133, Gluc 209/200/207, Crea 2.94, Alb 2.80            Diagnostic Tests/Procedures    Diagnostic Test/Procedures Comments HD today            Medications    Pertinent Medications Comments insulin, synthroid, protonix, vanc                  Estimated/Assessed Needs - Anthropometrics     Row Name 05/13/22 0700          Anthropometrics    Weight 54.7 kg (120 lb 8 oz)                Nutrition Prescription Ordered     Row Name 05/13/22 1033          Nutrition Prescription PO    Current PO Diet Regular     Fluid Consistency Thin     Common Modifiers Consistent Carbohydrate;Renal                Evaluation of Received Nutrient/Fluid Intake     Row Name 05/13/22 1033          Intake Assessment    Energy/Calorie Requirement Assessment meeting needs     Protein Requirement Assessment meeting needs     Fluid Requirement Assessment meeting needs            PO Evaluation    % PO Intake %                     Problem/Interventions:   Problem 1     Row Name 05/13/22 1033          Nutrition  Diagnoses Problem 1    Problem 1 Nutrition Appropriate for Condition at this Time                      Intervention Goal     Row Name 05/13/22 1033          Intervention Goal    General Maintain nutrition;Disease management/therapy;Improved nutrition related lab(s);Meet nutritional needs for age/condition     PO Maintain intake     Weight Maintain weight                Nutrition Intervention     Row Name 05/13/22 1034          Nutrition Intervention    RD/Tech Action Care plan reviewd;Follow Tx progress                  Education/Evaluation     Row Name 05/13/22 1034          Education    Education Will Instruct as appropriate            Monitor/Evaluation    Monitor Per protocol;Weight;Skin status;I&O;PO intake;Symptoms;Pertinent labs                 Electronically signed by:  Yashira Garcia RD  05/13/22 10:34 EDT

## 2022-05-13 NOTE — DISCHARGE SUMMARY
Patient Name: Zaina Martinez  : 1965  MRN: [id number]    Date of Admission: 2022  Date of Discharge:  2022  Primary Care Physician: Karson Oreilly MD      Chief Complaint:   Altered Mental Status (PT WITH AMS, LAST SEEN NORMAL APPROX. 0800 TODAY; PT NOT TALKING, LEFT  WEAKNESS; PT IS ON DIALYSIS BUT HAS MISSED HER LAST APPOINTMENT; PT WEARING FACE MASK)      Discharge Diagnoses     Active Hospital Problems    Diagnosis  POA   • **Encephalopathy, toxic [G92.9]  Unknown   • Acute CVA (cerebrovascular accident) (HCC) [I63.9]  Unknown   • Intracranial hemorrhage (HCC) [I62.9]  Unknown   • Acute metabolic encephalopathy [G93.41]  Yes   • Leukocytosis [D72.829]  Yes   • Acute on chronic diastolic CHF (congestive heart failure) (HCC) [I50.33]  Yes   • CAD (coronary artery disease) [I25.10]  Yes   • ESRD (end stage renal disease) (HCC) [N18.6]  Yes   • Hypertensive disorder [I10]  Yes   • Acute DM type 2 causing complication (HCC) [E11.8]  Yes      Resolved Hospital Problems   No resolved problems to display.        Hospital Course     56-year-old female with HTN, HLD, diabetes, thyroid disease, rheumatoid arthritis, depression, ESRD, CHF, and CAD who was admitted  with complaints of confusion and poor oral intake.       Hemodialysis catheter related infection with MRSA, now removed:  Tunneled dialysis catheter was draining purulent material. Vascular surgery consulted, tunneled dialysis catheter removed .  Culture of the tip came back positive for MRSA.  Blood cultures have been negative.   ID consulted.  On IV vancomycin.  ANJEL was  negative for vegetation.  Plan for 4 weeks of antibiotics in the setting of hemodialysis catheter associated infection with end date planned for May 26/22.  Patient will need weekly CBC with differential, creatinine, and vancomycin level while on therapy.     Right MCA syndrome status post IV TNKase/intracerebral hemorrhage  -On  she developed symptoms of  acute right MCA syndrome.  She was taken for team D imaging, CT/CTA/CTP was negative for any retrievable LVO or hemorrhage but did show some diminished perfusion in the right MCA inferior division.  She received IV TNK and subsequently developed a left frontal intracerebral hemorrhage.  The TNK was reversed with cryo . Neurosurgery evaluated, no interventions indicated.  Patient has been stable.  Repeat CT has been stable.  Neurology signed off.  Continue to hold antiplatelet/anticoagulation per neuro recommendations.  Patient needs repeat MRI in 3 months.  SBP goal less than 160.       Type II DM with episode of hypoglycemia: Blood glucose dropped overnight 05/11 to 49.  Lantus was discontinued.  Blood glucose levels improved, were in 200s 05/13.  Started with lower dose of Lantus at 8 units daily.  Continue sliding scale insulin.       ESRD.  Nephrology following.  Continue inpatient  HD M/W/F,    Hypertension: Continue carvedilol, amlodipine, hydralazine.,  And as needed IV hydralazine for SBP above 160.    Hypothyroidism: Continue levothyroxine    CAD: Continue to hold antiplatelet secondary to the intracranial hemorrhage.  Restart once cleared by neurology after follow-up.    Depression?  Psychiatry consulted for evaluation.    Day of Discharge     Subjective:  Patient seen this morning.  No acute events overnight.  States feels tired.  Denies any new weakness.  Denies fevers, chills, nausea, vomiting.    Physical Exam:  Temp:  [97.8 °F (36.6 °C)-98.2 °F (36.8 °C)] 98 °F (36.7 °C)  Heart Rate:  [81-86] 81  Resp:  [16-18] 16  BP: (139-170)/(64-83) 170/83  Body mass index is 20.56 kg/m².  Physical Exam  General: Alert and oriented x3, no acute distress, chronically ill-appearing  CV: Regular rate and rhythm, no murmurs rubs or gallops  Lungs: Clear to auscultation bilaterally, no crackles or wheezes  Abdomen: Soft, nontender, nondistended  Extremities: No significant peripheral edema , no cyanosis   Neuro: Alert,  oriented x3, delayed speech, normal strength in all extremities,  Consultants     Consult Orders (all) (From admission, onward)     Start     Ordered    05/12/22 1619  Inpatient Psychiatrist Consult  Once        Specialty:  Psychiatry  Provider:  Yordan Suero III, MD    05/12/22 1619    05/03/22 0831  Inpatient Cardiology Consult  Once        Specialty:  Cardiology  Provider:  Ghulam Shook III, MD    05/03/22 0831    05/01/22 1155  Inpatient Consult to Nutrition Services  Once        Provider:  (Not yet assigned)    05/01/22 1154    05/01/22 1055  Inpatient Nutrition Consult  Once        Provider:  (Not yet assigned)    05/01/22 1055    04/30/22 0917  Inpatient Neurosurgery Consult  Once        Specialty:  Neurosurgery  Provider:  Aureliano Duffy MD    04/30/22 0916    04/29/22 1637  Notify Stroke Coordinator  Once        Provider:  (Not yet assigned)    04/29/22 1639    04/29/22 1637  Inpatient Rehab Admission Consult  Once        Provider:  (Not yet assigned)    04/29/22 1639    04/29/22 1637  Inpatient Case Management  Consult  Once        Provider:  (Not yet assigned)    04/29/22 1639    04/29/22 1637  Inpatient Diabetes Educator Consult  Once,   Status:  Canceled        Provider:  (Not yet assigned)    04/29/22 1639    04/29/22 1637  Inpatient Intensivist Consult  Once        Specialty:  Intensive Care  Provider:  (Not yet assigned)    04/29/22 1639    04/28/22 1339  Inpatient Infectious Diseases Consult  Once        Specialty:  Infectious Diseases  Provider:  Kenyon Nation MD    04/28/22 1338    04/28/22 1142  Inpatient Neurology Consult General  Once        Specialty:  Neurology  Provider:  Jaswant Lester MD    04/28/22 1143    04/28/22 0638  Inpatient Vascular Surgery Consult  Once        Specialty:  Vascular Surgery  Provider:  Mat Cummings MD    04/28/22 0638    04/27/22 1833  [facility] (on-call MD unless specified) Details  Once        Specialty:   Hospitalist  Provider:  Starla Boston MD    04/27/22 1832    04/27/22 1725  Nephrology (on -call MD unless specified)  Once        Specialty:  Nephrology  Provider:  Dinesh Villalpando MD    04/27/22 1725              Procedures     TUNNELED CATHETER PLACEMENT      Imaging Results (All)     Procedure Component Value Units Date/Time    CT Head Without Contrast [[id number]] Collected: 05/09/22 0544     Updated: 05/09/22 0650    Addenda:        ADDENDUM:  05 09 22 06:49 Call Doctor Regarding Stroke, called Nurse Tesha on 05 09   06:49 (-04:00)      Signed: 05/09/22 0544 by John Florentino MD    Narrative:      CR  Patient: ELY MOTTA  Time Out: 05:44  Exam(s): CT HEAD Without Contrast     EXAM:    CT Head Without Intravenous Contrast    CLINICAL HISTORY:     Reason for exam: headache.    TECHNIQUE:    Axial computed tomography images of the head brain without intravenous   contrast.  CTDI is 54.8 mGy and DLP is 974.4 mGy-cm.  This CT exam was   performed according to the principle of ALARA (As Low As Reasonably   Achievable) by using one or more of the following dose reduction   techniques: automated exposure control, adjustment of the mA and or kV   according to patient size, and or use of iterative reconstruction   technique.    Comparison:  5 1 2022    FINDINGS:    Brain:  Unchanged parenchymal hematoma in the superior left frontal   lobe measuring 1.9 x 1.4 x 2.7 cm.  Mild surrounding edema.  No new   hemorrhage..    Ventricles:  Unremarkable.  No ventriculomegaly.    Bones joints:  Unremarkable.  No acute fracture.    Soft tissues:  Unremarkable.    Sinuses:  Unremarkable as visualized.  No acute sinusitis.    Mastoid air cells:  Unremarkable as visualized.  No mastoid effusion.    IMPRESSION:       Unchanged parenchymal hematoma in the superior left frontal lobe with   mild surrounding edema.  No new hemorrhage.    Impression:            Communications:     Call Doctor Stroke    Electronically signed  by John Florentino M.D. on 05-09-22 at 0544    XR Chest Post CVA Port [[id number]] Collected: 05/03/22 1058     Updated: 05/03/22 1602    Narrative:      STAT PORTABLE CHEST X-RAY 05/03/2022     CLINICAL HISTORY: Status post exchange of a right internal jugular  single lumen catheter for a right internal jugular double lumen  catheter, its distal tip projects over superior aspect of the right  atrium. No pneumothorax is seen. Dobbhoff courses down the esophagus  into the stomach, its distal tip is occluded from the field of imaging  and not evaluated. The cardiomediastinal silhouette is mildly enlarged.  Pulmonary vasculature is within normal limits. The lungs are clear and  costophrenic angles are sharp.       Impression:      Status post exchange of right IJ line for a double-lumen catheter with  its tip in the superior aspect of the right atrium and no definite  pneumothorax is seen, although a skin fold overlies the right lung apex  limiting evaluation. No definite active disease is seen in the chest.  The results were communicated to Jolene, the nurse on the floor taking  care of the patient, on 05/03/2022 at 10:30 AM.     This report was finalized on 5/3/2022 3:59 PM by Dr. Suhas Sanches M.D.       FL C Arm During Surgery [[id number]] Resulted: 05/03/22 0932     Updated: 05/03/22 0932    Narrative:      This procedure was auto-finalized with no dictation required.    XR Abdomen KUB [[id number]] Collected: 05/01/22 2116     Updated: 05/01/22 2120    Narrative:      KUB     HISTORY: Feeding tube placement     COMPARISON: The first 2022     FINDINGS:  Weighted enteric feeding tube appears to extend into the body of the  stomach. No free air seen beneath the diaphragm. Non tunneled dialysis  catheter extends into the right atrium. Visualized bowel gas pattern is  unremarkable. There is some residual contrast material identified within  the colon.     This report was finalized on 5/1/2022 9:17 PM by Dr. Harley  DAVID Rodriguez       XR Abdomen KUB [[id number]] Collected: 05/01/22 1155     Updated: 05/01/22 1158    Narrative:      XR ABDOMEN KUB-     Clinical: Core track catheter placement     FINDINGS: Core track catheter tip superimposes the projected area of the  mid gastric body. There is a central venous catheter in position, tip  superimposes the right atrium. There is a nonobstructive bowel gas  pattern within the upper abdomen, left-sided pleural effusion and  cardiac enlargement again seen. The remainder is unremarkable.     This report was finalized on 5/1/2022 11:55 AM by Dr. Vazquez Pollard M.D.       XR Chest Post CVA Port [[id number]] Collected: 05/01/22 1001     Updated: 05/01/22 1007    Narrative:      PORTABLE CHEST 05/01/2022 AT 9:21 AM     CLINICAL HISTORY: Evaluate central line placement.     Compared to the previous chest dated 04/27/2022.     In the interval, the right internal jugular dialysis catheter has been  replaced. The new catheter appears in satisfactory position with its tip  in the right atrium. There is no pneumothorax. There is moderate  vascular ingestion but appears focally more prominent. The lungs are  poorly inflated. The heart is enlarged and unchanged.     This report was finalized on 5/1/2022 10:04 AM by Dr. Darien Barron M.D.       CT Head Without Contrast [[id number]] Collected: 05/01/22 0523     Updated: 05/01/22 0534    Narrative:      CT HEAD WITHOUT CONTRAST     HISTORY: Stroke     COMPARISON: 04/30/2022     TECHNIQUE: Axial CT imaging was obtained through the brain. No IV  contrast was administered.     FINDINGS:  Previously identified left frontal intraparenchymal hematoma is not  significantly changed, measuring 2.3 x 1.5 cm. No new areas of  hemorrhage are seen. Patient appears to have some continued residual  contrast material within the intracranial circulation. There is diffuse  edema noted throughout both cerebral hemispheres. There is  periventricular and deep  white matter microangiopathic change. There is  no midline shift. Area of encephalomalacia within the medial left  parietal lobe is stable.       Impression:      Stable size to previously identified left frontal intraparenchymal  hematoma.     Radiation dose reduction techniques were utilized, including automated  exposure control and exposure modulation based on body size.     This report was finalized on 5/1/2022 5:31 AM by Dr. Cherri Rodriguez M.D.       CT Head Without Contrast [[id number]] Collected: 04/30/22 1729     Updated: 04/30/22 1729    Narrative:        Patient: ELY MOTTA  Time Out: 17:28  Exam(s): CT HEAD Without Contrast     EXAM:  CT Head Without Intravenous Contrast    CLINICAL HISTORY:  Reason for exam: 24 hour post TNK.    TECHNIQUE:  Axial computed tomography images of the head brain without intravenous   contrast.  CTDI is 54.80 mGy and DLP is 941.50 mGy-cm.  This CT exam was   performed according to the principle of ALARA (As Low As Reasonably   Achievable) by using one or more of the following dose reduction   techniques: automated exposure control, adjustment of the mA and or kV   according to patient size, and or use of iterative reconstruction   technique.    COMPARISON:  CT head 4 29 22 at 1609 hrs.    FINDINGS:  Hyperdense intraparenchymal focus in the left frontal lobe measuring 2.2   x 1.5 x 2.7 cm concerning for acute intraparenchymal hematoma.  Some   curvilinear hyperdensity suggested within the right cerebral hemisphere   may represent trace acute subarachnoid hemorrhage.  There is no midline   shift or herniation.  There is a background of involutional and chronic   small vessel ischemic change.  There is no hydrocephalus.  Intracranial   atherosclerosis is noted.  Calvarium is intact.  Sinuses and mastoid air   cells are clear.    IMPRESSION:     1.  Acute intraparenchymal hematoma left frontal lobe measuring 2.2 x 1.5   x 2.7 cm.   2.  Question trace acute subarachnoid  hemorrhage within the right   convexity sulci.  3.  Continued follow-up recommended.      Impression:          Electronically signed by John Henderson M.D. on 04-30-22 at 1728    MRI Brain Without Contrast [[id number]] Collected: 04/30/22 1149     Updated: 04/30/22 1213    Narrative:      MRI BRAIN WO CONTRAST-     CLINICAL HISTORY: Follow-up internal cerebral hemorrhage. Increasing  confusion.     TECHNIQUE: Multiple axial T1, T2, diffusion and gradient echo images  were obtained. Sagittal T1-weighted images were also acquired.      COMPARISON: CT scans of the head dated 04/29/2022 at 04/30/2022. MRIs of  the brain dated 4/28/2022 and 12/2/2021.     FINDINGS: The images are somewhat degraded due to patient motion. There  is an approximately 2.1 x 1.4 cm diameter hematoma in the superior  aspect of the left frontal lobe. This shows predominantly low signal  intensity on the T2-weighted images and is isointense with brain  parenchyma on the T1-weighted images. The findings are consistent with  an acute to subacute hematoma containing predominantly intact right  blood cells filled with deoxyhemoglobin. There appears unchanged in  overall size since the most recent CT head on 04/30/2022. Moderate  vasogenic edema surrounds the hemorrhage. This is of uncertain etiology.  There are no findings that would suggest amyloid angiopathy. In  addition, the patient had a CTA of the head on 04/29/2022 that, even in  retrospect shows no evidence of a vascular malformation in this region.   The hemorrhage produces no significant mass effect. There are no foci of  restricted diffusion within the brain or brainstem. There are no  discrete masses. There is moderate confluent T2 hyperintensity adjacent  to the occipital and temporal horns of both lateral ventricles and to  lesser extent adjacent to the frontal horns of both lateral ventricles  that was also present on the previous MRI studies, and appears  essentially unchanged. This may  be sequela of small vessel chronic  ischemic phenomena.       Impression:      Focal area of acute to subacute intracerebral hemorrhage  within the white matter of the left frontal lobe near the vertex as  described. The hemorrhage appears unchanged in overall size since the CT  performed earlier this morning. Mild vasogenic edema surrounds the  hemorrhage. There is a thin rind of confluent T2 hyperintensity  surrounding both lateral ventricles that is unchanged since a preceding  MRI studies, and is most likely sequela of small vessel chronic ischemic  phenomena.     This report was finalized on 4/30/2022 12:10 PM by Dr. Darien Barron M.D.       CT Head Without Contrast [[id number]] Collected: 04/30/22 0438     Updated: 04/30/22 0447    Narrative:      CT HEAD WITHOUT CONTRAST     HISTORY: Stroke     COMPARISON: 04/29/2022     TECHNIQUE: Axial CT imaging was obtained through the brain. No IV  contrast was administered.     FINDINGS:  The previously noted left frontal intraparenchymal hematoma has  increased in size, now measuring 2.7 x 1.8 x 2.3 cm, previously 2.0 x  1.3 x 1.8 cm. Residual contrast is noted within the intracranial  vessels. There is no midline shift or mass effect. There is atrophy.  There is periventricular and deep white matter microangiopathic change.  No new areas of hemorrhage are seen. There is an area of  encephalomalacia noted within the medial left parietal lobe.       Impression:      Interval increase in the size of the previously identified left frontal  intraparenchymal hematoma.     FINDINGS were called to patient's nurse at 4:43 AM.     Radiation dose reduction techniques were utilized, including automated  exposure control and exposure modulation based on body size.     This report was finalized on 4/30/2022 4:44 AM by Dr. Cherri Rodriguez M.D.       CT Head Without Contrast [[id number]] Collected: 04/29/22 2256     Updated: 04/29/22 2307    Narrative:      CT HEAD WITHOUT  CONTRAST     HISTORY: Metal status changes     COMPARISON: 04/29/2022     TECHNIQUE: Axial CT imaging was obtained through the brain. No IV  contrast was administered.     FINDINGS:  This patient has a hyperdense focus identified within the left frontal  lobe, measuring 2.0 x 1.3 x 1.8 cm. An area of acute intraparenchymal  hemorrhage cannot be excluded. It was not present on earlier study. The  patient does have residual contrast material from an earlier CTA within  the intracranial vessels. The patient has diffuse atrophy. There is  periventricular and deep white matter microangiopathic change. There is  no midline shift or mass effect at this time. The paranasal sinuses and  mastoid air cells appear clear.       Impression:         1. Hyperdense focus identified within the left frontal lobe, new when  compared to prior exam, worrisome for an area of intraparenchymal  hemorrhage, given history of thrombolytics. Close follow-up is  recommended.     FINDINGS were relayed to Dr. Tovar at 11:00 PM.     Radiation dose reduction techniques were utilized, including automated  exposure control and exposure modulation based on body size.     This report was finalized on 4/29/2022 11:04 PM by Dr. Cherri Rodriguez M.D.       CT Angiogram Neck [[id number]] Collected: 04/29/22 1652     Updated: 04/29/22 1717    Narrative:      CT ANGIOGRAPHY IMAGING OF THE HEAD AND NECK AND CT BRAIN PERFUSION  IMAGING     CLINICAL HISTORY: Left-sided weakness and neglect. Possible acute CVA.     TECHNIQUE: Transverse 3 mm thick images were obtained from the base of  the skull to the vertex without IV contrast. Subsequently, spiral CT  images were acquired through the head and neck during rapid IV injection  of contrast and were reconstructed in 1 mm thick slices. Multiple  coronal and sagittal and 3-D reconstructions were produced. CT brain  perfusion imaging was also performed.     AI analysis of LVO was utilized.     Radiation dose  reduction techniques were utilized, including automated  exposure control and exposure modulation based on body size.     COMPARISON: MRI of the brain dated 04/20/2022. CT scan of the head dated  04/27/2022.     FINDINGS: The precontrast images demonstrate minimal ill-defined  diminished attenuation in the periventricular white matter of both  cerebral hemispheres consistent with small vessel chronic ischemic  change and are otherwise unremarkable. No acute infarct or hemorrhage is  identified. There is no mass effect.     The brain perfusion images demonstrate normal symmetric brain perfusion.  There is no evidence of ischemia or infarction.     There is normal branching of the great vessels from the aortic arch that  all appear widely patent without NASCET significant stenosis. No  stenoses are identified in either common or external or internal carotid  artery in the neck. The carotid bifurcations appear normal. The  vertebral arteries are codominant and are both widely patent throughout  their course in the neck and head. Both supply the basilar artery. There  are no intracranial stenoses or occlusions. No aneurysms are identified.     Postcontrast images of the brain parenchyma demonstrate no enhancing  lesions. There are no masses.     Images through the upper aspect of the chest demonstrate moderate-sized  bilateral posteriorly layering pleural effusions.     IMPRESSIONS:  Unremarkable CT angiography imaging of the neck and head  and CT brain perfusion imaging. There is no evidence of cerebral  ischemia or infarction. No NASCET significant stenoses or focal vascular  occlusions are identified in the neck or head. Moderate-sized bilateral  pleural effusions are partially imaged.     This report was finalized on 4/29/2022 5:14 PM by Dr. Darien Barron M.D.       CT CEREBRAL PERFUSION WITH & WITHOUT CONTRAST [[id number]] Collected: 04/29/22 1652     Updated: 04/29/22 1717    Narrative:      CT ANGIOGRAPHY  IMAGING OF THE HEAD AND NECK AND CT BRAIN PERFUSION  IMAGING     CLINICAL HISTORY: Left-sided weakness and neglect. Possible acute CVA.     TECHNIQUE: Transverse 3 mm thick images were obtained from the base of  the skull to the vertex without IV contrast. Subsequently, spiral CT  images were acquired through the head and neck during rapid IV injection  of contrast and were reconstructed in 1 mm thick slices. Multiple  coronal and sagittal and 3-D reconstructions were produced. CT brain  perfusion imaging was also performed.     AI analysis of LVO was utilized.     Radiation dose reduction techniques were utilized, including automated  exposure control and exposure modulation based on body size.     COMPARISON: MRI of the brain dated 04/20/2022. CT scan of the head dated  04/27/2022.     FINDINGS: The precontrast images demonstrate minimal ill-defined  diminished attenuation in the periventricular white matter of both  cerebral hemispheres consistent with small vessel chronic ischemic  change and are otherwise unremarkable. No acute infarct or hemorrhage is  identified. There is no mass effect.     The brain perfusion images demonstrate normal symmetric brain perfusion.  There is no evidence of ischemia or infarction.     There is normal branching of the great vessels from the aortic arch that  all appear widely patent without NASCET significant stenosis. No  stenoses are identified in either common or external or internal carotid  artery in the neck. The carotid bifurcations appear normal. The  vertebral arteries are codominant and are both widely patent throughout  their course in the neck and head. Both supply the basilar artery. There  are no intracranial stenoses or occlusions. No aneurysms are identified.     Postcontrast images of the brain parenchyma demonstrate no enhancing  lesions. There are no masses.     Images through the upper aspect of the chest demonstrate moderate-sized  bilateral posteriorly  layering pleural effusions.     IMPRESSIONS:  Unremarkable CT angiography imaging of the neck and head  and CT brain perfusion imaging. There is no evidence of cerebral  ischemia or infarction. No NASCET significant stenoses or focal vascular  occlusions are identified in the neck or head. Moderate-sized bilateral  pleural effusions are partially imaged.     This report was finalized on 4/29/2022 5:14 PM by Dr. Darien Barron M.D.       CT Angiogram Head w AI Analysis of LVO [[id number]] Collected: 04/29/22 1652     Updated: 04/29/22 1717    Narrative:      CT ANGIOGRAPHY IMAGING OF THE HEAD AND NECK AND CT BRAIN PERFUSION  IMAGING     CLINICAL HISTORY: Left-sided weakness and neglect. Possible acute CVA.     TECHNIQUE: Transverse 3 mm thick images were obtained from the base of  the skull to the vertex without IV contrast. Subsequently, spiral CT  images were acquired through the head and neck during rapid IV injection  of contrast and were reconstructed in 1 mm thick slices. Multiple  coronal and sagittal and 3-D reconstructions were produced. CT brain  perfusion imaging was also performed.     AI analysis of LVO was utilized.     Radiation dose reduction techniques were utilized, including automated  exposure control and exposure modulation based on body size.     COMPARISON: MRI of the brain dated 04/20/2022. CT scan of the head dated  04/27/2022.     FINDINGS: The precontrast images demonstrate minimal ill-defined  diminished attenuation in the periventricular white matter of both  cerebral hemispheres consistent with small vessel chronic ischemic  change and are otherwise unremarkable. No acute infarct or hemorrhage is  identified. There is no mass effect.     The brain perfusion images demonstrate normal symmetric brain perfusion.  There is no evidence of ischemia or infarction.     There is normal branching of the great vessels from the aortic arch that  all appear widely patent without NASCET significant  stenosis. No  stenoses are identified in either common or external or internal carotid  artery in the neck. The carotid bifurcations appear normal. The  vertebral arteries are codominant and are both widely patent throughout  their course in the neck and head. Both supply the basilar artery. There  are no intracranial stenoses or occlusions. No aneurysms are identified.     Postcontrast images of the brain parenchyma demonstrate no enhancing  lesions. There are no masses.     Images through the upper aspect of the chest demonstrate moderate-sized  bilateral posteriorly layering pleural effusions.     IMPRESSIONS:  Unremarkable CT angiography imaging of the neck and head  and CT brain perfusion imaging. There is no evidence of cerebral  ischemia or infarction. No NASCET significant stenoses or focal vascular  occlusions are identified in the neck or head. Moderate-sized bilateral  pleural effusions are partially imaged.     This report was finalized on 4/29/2022 5:14 PM by Dr. Darien Barron M.D.       MRI Brain Without Contrast [[id number]] Collected: 04/28/22 2308     Updated: 04/28/22 2308    Narrative:        Patient: ELY MOTTA  Time Out: 23:08  Exam(s): MRI HEAD Without Contrast     EXAM:    MR Head Without Intravenous Contrast    CLINICAL HISTORY:     Reason for exam: follow up on press.    TECHNIQUE:    Magnetic resonance images of the head brain without intravenous   contrast in multiple planes.    COMPARISON:    CT brain 4 27 2022, MRI brain 12 2 2021.    FINDINGS:    Limitations:  Study is limited by patient motion.    Brain: No acute stroke.  Resolution of previously demonstrated left   cerebellar hyperintensities on prior MRI.  Unchanged supratentorial but   only periventricular white matter hyperintensities on FLAIR and T2-  weighted images.  Asymmetric left frontal subcortical remote white matter   infarct.  No acute hemorrhage or abnormal extra-axial fluid collection.    Ventricles:  No midline shift.   No ventriculomegaly.    Bones joints:  Unremarkable.    Sinuses:  Unremarkable as visualized.  No acute sinusitis.    Mastoid air cells:  Unremarkable as visualized.  No mastoid effusion.    Orbits:  Unremarkable as visualized.    IMPRESSION:       No acute stroke or hemorrhage.  Nonspecific white matter changes most commonly seen with small vessel   disease.  Old left frontal subcortical infarct.  Resolved left cerebellar   parenchymal abnormality seen on prior MRI of 12 2 2021, possibly resolved   PRES.       Impression:          Electronically signed by Frederic Jackson M.D. on 04-28-22 at 2308    CT Head Without Contrast [[id number]] Collected: 04/27/22 1826     Updated: 04/27/22 1957    Narrative:      CT HEAD WITHOUT CONTRAST     HISTORY: Altered mental status.     COMPARISON: 02/23/2022.     FINDINGS: The brain and ventricles are symmetrical. There is no evidence  of hemorrhage, hydrocephalus or of abnormal extra-axial fluid. No focal  area of decreased attenuation to suggest acute infarction is identified.  Areas of decreased attenuation involving the white matter of the  cerebral hemispheres are appreciated bilaterally nonspecific but  suggesting small vessel ischemic disease. Moderate vascular  calcification is noted.       Impression:      No evidence of acute infarction, intracranial hemorrhage,  hydrocephalus or of abnormal extra-axial fluid. Further evaluation could  be performed with a MRI examination of the brain as indicated.           Radiation dose reduction techniques were utilized, including automated  exposure control and exposure modulation based on body size.     This report was finalized on 4/27/2022 7:54 PM by Dr. Osmar Nelson M.D.       XR Chest 1 View [[id number]] Collected: 04/27/22 1634     Updated: 04/27/22 1642    Narrative:      PORTABLE CHEST 04/27/2022 AT 4:14 PM     CLINICAL HISTORY: Confusion. Congestion.     Compared to previous chest dated 02/23/2022.     A right internal  jugular dialysis catheter remains in satisfactory  position without change. There is mild vascular engorgement but no manuel  pulmonary edema. No definite focal infiltrates are identified. The heart  is borderline enlarged and unchanged.     This report was finalized on 4/27/2022 4:39 PM by Dr. Darien Barron M.D.           Results for orders placed during the hospital encounter of 04/27/22    Duplex Initial Vein Mapping Hemodialysis Access Bilateral CAR    Interpretation Summary  · The right brachial bifurcation occurs at mid upper arm.  · The right basilic vein is patent and of adequate size in the upper arm.  · Acute superficial thrombophlebitis noted in the left cephalic vein in the upper arm.  · The left basilic vein is patent and of adequate size in the upper arm.  · The left brachial artery is patent and of adequate size.    Results for orders placed during the hospital encounter of 04/27/22    Adult Transesophageal Echo (ANJEL) W/ Cont if Necessary Per Protocol    Interpretation Summary  · Estimated left ventricular EF = 65% Left ventricular systolic function is normal.  · Left ventricular diastolic function was not assessed.  · Saline test results are negative.  · Estimated right ventricular systolic pressure from tricuspid regurgitation is normal (<35 mmHg).  · No valvular vegetations seen.    Pertinent Labs     Results from last 7 days   Lab Units 05/13/22  0534 05/12/22  0607 05/11/22  0509 05/10/22  0547   WBC 10*3/mm3 8.67 7.54 8.40 8.42   HEMOGLOBIN g/dL 9.0* 9.7* 9.3* 9.1*   PLATELETS 10*3/mm3 174 212 250 271     Results from last 7 days   Lab Units 05/13/22  0534 05/12/22  0607 05/11/22  0509 05/10/22  0547   SODIUM mmol/L 133* 131* 135* 136   POTASSIUM mmol/L 4.4 3.6 4.5 3.8   CHLORIDE mmol/L 99 99 99 102   CO2 mmol/L 22.0 24.3 25.0 23.8   BUN mg/dL 19 10 17 10   CREATININE mg/dL 2.94* 2.08* 2.76* 2.12*   GLUCOSE mg/dL 200* 153* 174* 237*   Estimated Creatinine Clearance: 17.2 mL/min (A) (by C-G  formula based on SCr of 2.94 mg/dL (H)).  Results from last 7 days   Lab Units 05/13/22  0534 05/12/22  0607 05/11/22  0509 05/10/22  0547   ALBUMIN g/dL 2.80* 3.10* 3.10* 2.80*     Results from last 7 days   Lab Units 05/13/22  0534 05/12/22  0607 05/11/22  0509 05/10/22  0547   CALCIUM mg/dL 7.9* 8.4* 8.1* 8.2*   ALBUMIN g/dL 2.80* 3.10* 3.10* 2.80*   MAGNESIUM mg/dL 1.9 1.9  --   --    PHOSPHORUS mg/dL 3.8 2.9 3.6 1.7*               Invalid input(s): LDLCALC      Results from last 7 days   Lab Units 05/09/22  0656   COVID19  Not Detected       Test Results Pending at Discharge     Pending Labs     Order Current Status    COVID PRE-OP / PRE-PROCEDURE SCREENING ORDER (NO ISOLATION) - Swab, Nasopharynx Collected (05/13/22 1622)    COVID-19,[facility] CEPHE[facility]/MARTY NP SWAB IN TRANSPORT MEDIA 8-12 HR TAT - Swab, Nasopharynx Collected (05/13/22 1622)          Discharge Details        Discharge Medications      ASK your doctor about these medications      Instructions Start Date   acetaminophen 325 MG tablet  Commonly known as: TYLENOL   650 mg, Oral, Every 4 Hours PRN      amLODIPine 5 MG tablet  Commonly known as: NORVASC   10 mg, Oral, Daily      aspirin 81 MG EC tablet   81 mg, Oral, Daily      folic acid 1 MG tablet  Commonly known as: FOLVITE   1 mg, Oral, Daily      furosemide 40 MG tablet  Commonly known as: LASIX   40 mg, Oral, 2 Times Daily      hydrALAZINE 100 MG tablet  Commonly known as: APRESOLINE   100 mg, Oral, 3 Times Daily      HYDROcodone-acetaminophen 5-325 MG per tablet  Commonly known as: NORCO   1 tablet, Oral, Every 6 Hours PRN      insulin glargine 100 UNIT/ML injection  Commonly known as: LANTUS, SEMGLEE   10 Units, Subcutaneous, Daily      insulin lispro 100 UNIT/ML injection  Commonly known as: humaLOG   3 Units, Subcutaneous, 3 Times Daily Before Meals      levothyroxine 125 MCG tablet  Commonly known as: SYNTHROID, LEVOTHROID   125 mcg, Oral, Daily      metoprolol tartrate 25 MG  tablet  Commonly known as: LOPRESSOR   25 mg, Oral, 2 Times Daily      multivitamin with minerals tablet tablet   1 tablet, Oral, Daily      OLANZapine 5 MG tablet  Commonly known as: zyPREXA   5 mg, Oral, 2 Times Daily      pantoprazole 40 MG EC tablet  Commonly known as: PROTONIX   40 mg, Oral, Daily      rOPINIRole 0.25 MG tablet  Commonly known as: REQUIP   0.75 mg, Oral, Every Night at Bedtime      sertraline 100 MG tablet  Commonly known as: ZOLOFT   100 mg, Oral, Daily      tamsulosin 0.4 MG capsule 24 hr capsule  Commonly known as: FLOMAX   1 capsule, Oral, Daily      vitamin D3 125 MCG (5000 UT) capsule capsule   2,000 Units, Oral, Daily             Allergies   Allergen Reactions   • Contrast Dye Confusion   • Ibuprofen Other (See Comments)     Kidney disease   • Prochlorperazine Other (See Comments)     Dystonic reaction            Discharge Disposition:  Short Term Hospital ([facility])      Discharge Diet:  Diet Order   Procedures   • Diet Regular; Thin; Consistent Carbohydrate, Renal       Discharge Activity:       CODE STATUS:    Code Status and Medical Interventions:   Ordered at: 04/27/22 2106     Code Status (Patient has no pulse and is not breathing):    CPR (Attempt to Resuscitate)     Medical Interventions (Patient has pulse or is breathing):    Full       Future Appointments   Date Time Provider Department Center   5/26/2022  9:50 AM Rolando Canas DO [facility]   8/31/2022  9:00 AM John Tovar MD [facility]      Follow-up Information     John Tovar MD Follow up in 3 month(s).    Specialty: Neurology  Why: MRI brain with and without contrast to be done prior to this appointment.  Contact information:  [address]  [address]  [facility] [id number] [phone]             Karson Oreilly MD .    Specialty: Family Medicine  Contact information:  [address]  [address]  [facility] [id number] [phone]                         Time Spent  on Discharge:  Greater than 30 minutes      Albert Templeton MD  [facility]  05/13/22  16:21 EDT

## 2022-05-14 LAB
ALBUMIN SERPL-MCNC: 2.9 G/DL (ref 3.5–5.2)
ANION GAP SERPL CALCULATED.3IONS-SCNC: 11 MMOL/L (ref 5–15)
BUN SERPL-MCNC: 18 MG/DL (ref 6–20)
BUN/CREAT SERPL: 8.2 (ref 7–25)
CALCIUM SPEC-SCNC: 7.6 MG/DL (ref 8.6–10.5)
CHLORIDE SERPL-SCNC: 102 MMOL/L (ref 98–107)
CO2 SERPL-SCNC: 21 MMOL/L (ref 22–29)
CREAT SERPL-MCNC: 2.19 MG/DL (ref 0.57–1)
EGFRCR SERPLBLD CKD-EPI 2021: 25.9 ML/MIN/1.73
GLUCOSE BLDC GLUCOMTR-MCNC: 115 MG/DL (ref 70–130)
GLUCOSE BLDC GLUCOMTR-MCNC: 117 MG/DL (ref 70–130)
GLUCOSE BLDC GLUCOMTR-MCNC: 283 MG/DL (ref 70–130)
GLUCOSE BLDC GLUCOMTR-MCNC: 296 MG/DL (ref 70–130)
GLUCOSE SERPL-MCNC: 204 MG/DL (ref 65–99)
PHOSPHATE SERPL-MCNC: 2.6 MG/DL (ref 2.5–4.5)
POTASSIUM SERPL-SCNC: 3.6 MMOL/L (ref 3.5–5.2)
SODIUM SERPL-SCNC: 134 MMOL/L (ref 136–145)

## 2022-05-14 PROCEDURE — 82962 GLUCOSE BLOOD TEST: CPT

## 2022-05-14 PROCEDURE — 63710000001 INSULIN LISPRO (HUMAN) PER 5 UNITS: Performed by: PHYSICAL MEDICINE & REHABILITATION

## 2022-05-14 PROCEDURE — 97110 THERAPEUTIC EXERCISES: CPT | Performed by: PHYSICAL THERAPIST

## 2022-05-14 PROCEDURE — 97535 SELF CARE MNGMENT TRAINING: CPT

## 2022-05-14 PROCEDURE — 96125 COGNITIVE TEST BY HC PRO: CPT

## 2022-05-14 PROCEDURE — 97166 OT EVAL MOD COMPLEX 45 MIN: CPT

## 2022-05-14 PROCEDURE — 97162 PT EVAL MOD COMPLEX 30 MIN: CPT | Performed by: PHYSICAL THERAPIST

## 2022-05-14 PROCEDURE — 80069 RENAL FUNCTION PANEL: CPT | Performed by: PHYSICAL MEDICINE & REHABILITATION

## 2022-05-14 RX ORDER — GABAPENTIN 300 MG/1
300 CAPSULE ORAL NIGHTLY
Status: DISCONTINUED | OUTPATIENT
Start: 2022-05-14 | End: 2022-05-17

## 2022-05-14 RX ADMIN — GABAPENTIN 300 MG: 300 CAPSULE ORAL at 22:21

## 2022-05-14 RX ADMIN — ACETAMINOPHEN 325MG 650 MG: 325 TABLET ORAL at 05:44

## 2022-05-14 RX ADMIN — HYDRALAZINE HYDROCHLORIDE 100 MG: 50 TABLET, FILM COATED ORAL at 22:30

## 2022-05-14 RX ADMIN — HYDRALAZINE HYDROCHLORIDE 100 MG: 50 TABLET, FILM COATED ORAL at 05:43

## 2022-05-14 RX ADMIN — AMLODIPINE BESYLATE 10 MG: 10 TABLET ORAL at 08:25

## 2022-05-14 RX ADMIN — LEVOTHYROXINE SODIUM 125 MCG: 0.12 TABLET ORAL at 05:43

## 2022-05-14 RX ADMIN — ROPINIROLE HYDROCHLORIDE 0.75 MG: 0.5 TABLET, FILM COATED ORAL at 22:21

## 2022-05-14 RX ADMIN — INSULIN LISPRO 8 UNITS: 100 INJECTION, SOLUTION INTRAVENOUS; SUBCUTANEOUS at 22:22

## 2022-05-14 RX ADMIN — HYDRALAZINE HYDROCHLORIDE 100 MG: 50 TABLET, FILM COATED ORAL at 14:47

## 2022-05-14 RX ADMIN — CARVEDILOL 3.12 MG: 3.12 TABLET, FILM COATED ORAL at 17:22

## 2022-05-14 RX ADMIN — CARVEDILOL 3.12 MG: 3.12 TABLET, FILM COATED ORAL at 08:25

## 2022-05-14 RX ADMIN — SERTRALINE 100 MG: 100 TABLET, FILM COATED ORAL at 08:24

## 2022-05-14 RX ADMIN — INSULIN LISPRO 8 UNITS: 100 INJECTION, SOLUTION INTRAVENOUS; SUBCUTANEOUS at 08:25

## 2022-05-14 RX ADMIN — INSULIN GLARGINE-YFGN 8 UNITS: 100 INJECTION, SOLUTION SUBCUTANEOUS at 08:26

## 2022-05-14 RX ADMIN — PANTOPRAZOLE SODIUM 40 MG: 40 TABLET, DELAYED RELEASE ORAL at 05:43

## 2022-05-14 RX ADMIN — TAMSULOSIN HYDROCHLORIDE 0.4 MG: 0.4 CAPSULE ORAL at 08:25

## 2022-05-15 LAB
ALBUMIN SERPL-MCNC: 2.8 G/DL (ref 3.5–5.2)
ANION GAP SERPL CALCULATED.3IONS-SCNC: 11.9 MMOL/L (ref 5–15)
BUN SERPL-MCNC: 36 MG/DL (ref 6–20)
BUN/CREAT SERPL: 10.9 (ref 7–25)
CALCIUM SPEC-SCNC: 8.1 MG/DL (ref 8.6–10.5)
CHLORIDE SERPL-SCNC: 99 MMOL/L (ref 98–107)
CO2 SERPL-SCNC: 21.1 MMOL/L (ref 22–29)
CREAT SERPL-MCNC: 3.3 MG/DL (ref 0.57–1)
DEPRECATED RDW RBC AUTO: 51.8 FL (ref 37–54)
EGFRCR SERPLBLD CKD-EPI 2021: 15.8 ML/MIN/1.73
ERYTHROCYTE [DISTWIDTH] IN BLOOD BY AUTOMATED COUNT: 17.2 % (ref 12.3–15.4)
GLUCOSE BLDC GLUCOMTR-MCNC: 174 MG/DL (ref 70–130)
GLUCOSE BLDC GLUCOMTR-MCNC: 174 MG/DL (ref 70–130)
GLUCOSE BLDC GLUCOMTR-MCNC: 211 MG/DL (ref 70–130)
GLUCOSE BLDC GLUCOMTR-MCNC: 293 MG/DL (ref 70–130)
GLUCOSE SERPL-MCNC: 202 MG/DL (ref 65–99)
HCT VFR BLD AUTO: 25.8 % (ref 34–46.6)
HGB BLD-MCNC: 8.6 G/DL (ref 12–15.9)
MCH RBC QN AUTO: 27.7 PG (ref 26.6–33)
MCHC RBC AUTO-ENTMCNC: 33.3 G/DL (ref 31.5–35.7)
MCV RBC AUTO: 83 FL (ref 79–97)
PHOSPHATE SERPL-MCNC: 3.9 MG/DL (ref 2.5–4.5)
PLATELET # BLD AUTO: 156 10*3/MM3 (ref 140–450)
PMV BLD AUTO: 9.8 FL (ref 6–12)
POTASSIUM SERPL-SCNC: 4.2 MMOL/L (ref 3.5–5.2)
RBC # BLD AUTO: 3.11 10*6/MM3 (ref 3.77–5.28)
SODIUM SERPL-SCNC: 132 MMOL/L (ref 136–145)
WBC NRBC COR # BLD: 8.41 10*3/MM3 (ref 3.4–10.8)

## 2022-05-15 PROCEDURE — 80069 RENAL FUNCTION PANEL: CPT | Performed by: PHYSICAL MEDICINE & REHABILITATION

## 2022-05-15 PROCEDURE — 85027 COMPLETE CBC AUTOMATED: CPT | Performed by: INTERNAL MEDICINE

## 2022-05-15 PROCEDURE — 63710000001 INSULIN LISPRO (HUMAN) PER 5 UNITS: Performed by: PHYSICAL MEDICINE & REHABILITATION

## 2022-05-15 PROCEDURE — 82962 GLUCOSE BLOOD TEST: CPT

## 2022-05-15 RX ADMIN — PANTOPRAZOLE SODIUM 40 MG: 40 TABLET, DELAYED RELEASE ORAL at 06:20

## 2022-05-15 RX ADMIN — AMLODIPINE BESYLATE 10 MG: 10 TABLET ORAL at 07:35

## 2022-05-15 RX ADMIN — GABAPENTIN 300 MG: 300 CAPSULE ORAL at 21:04

## 2022-05-15 RX ADMIN — HYDRALAZINE HYDROCHLORIDE 100 MG: 50 TABLET, FILM COATED ORAL at 14:04

## 2022-05-15 RX ADMIN — INSULIN LISPRO 5 UNITS: 100 INJECTION, SOLUTION INTRAVENOUS; SUBCUTANEOUS at 11:29

## 2022-05-15 RX ADMIN — TAMSULOSIN HYDROCHLORIDE 0.4 MG: 0.4 CAPSULE ORAL at 07:35

## 2022-05-15 RX ADMIN — INSULIN LISPRO 8 UNITS: 100 INJECTION, SOLUTION INTRAVENOUS; SUBCUTANEOUS at 21:04

## 2022-05-15 RX ADMIN — CARVEDILOL 3.12 MG: 3.12 TABLET, FILM COATED ORAL at 07:35

## 2022-05-15 RX ADMIN — INSULIN GLARGINE-YFGN 8 UNITS: 100 INJECTION, SOLUTION SUBCUTANEOUS at 07:36

## 2022-05-15 RX ADMIN — SERTRALINE 100 MG: 100 TABLET, FILM COATED ORAL at 07:35

## 2022-05-15 RX ADMIN — ROPINIROLE HYDROCHLORIDE 0.75 MG: 0.5 TABLET, FILM COATED ORAL at 21:04

## 2022-05-15 RX ADMIN — INSULIN LISPRO 3 UNITS: 100 INJECTION, SOLUTION INTRAVENOUS; SUBCUTANEOUS at 17:16

## 2022-05-15 RX ADMIN — HYDRALAZINE HYDROCHLORIDE 100 MG: 50 TABLET, FILM COATED ORAL at 21:04

## 2022-05-15 RX ADMIN — CARVEDILOL 3.12 MG: 3.12 TABLET, FILM COATED ORAL at 17:16

## 2022-05-15 RX ADMIN — HYDRALAZINE HYDROCHLORIDE 100 MG: 50 TABLET, FILM COATED ORAL at 06:20

## 2022-05-15 RX ADMIN — INSULIN LISPRO 3 UNITS: 100 INJECTION, SOLUTION INTRAVENOUS; SUBCUTANEOUS at 07:35

## 2022-05-15 RX ADMIN — LEVOTHYROXINE SODIUM 125 MCG: 0.12 TABLET ORAL at 06:20

## 2022-05-16 LAB
ALBUMIN SERPL-MCNC: 3.3 G/DL (ref 3.5–5.2)
ANION GAP SERPL CALCULATED.3IONS-SCNC: 14.2 MMOL/L (ref 5–15)
BASOPHILS # BLD AUTO: 0.08 10*3/MM3 (ref 0–0.2)
BASOPHILS NFR BLD AUTO: 0.9 % (ref 0–1.5)
BUN SERPL-MCNC: 45 MG/DL (ref 6–20)
BUN/CREAT SERPL: 11.6 (ref 7–25)
CALCIUM SPEC-SCNC: 8.2 MG/DL (ref 8.6–10.5)
CHLORIDE SERPL-SCNC: 99 MMOL/L (ref 98–107)
CO2 SERPL-SCNC: 18.8 MMOL/L (ref 22–29)
CREAT SERPL-MCNC: 3.89 MG/DL (ref 0.57–1)
DEPRECATED RDW RBC AUTO: 55.2 FL (ref 37–54)
EGFRCR SERPLBLD CKD-EPI 2021: 13 ML/MIN/1.73
EOSINOPHIL # BLD AUTO: 0.14 10*3/MM3 (ref 0–0.4)
EOSINOPHIL NFR BLD AUTO: 1.6 % (ref 0.3–6.2)
ERYTHROCYTE [DISTWIDTH] IN BLOOD BY AUTOMATED COUNT: 17.9 % (ref 12.3–15.4)
GLUCOSE BLDC GLUCOMTR-MCNC: 181 MG/DL (ref 70–130)
GLUCOSE BLDC GLUCOMTR-MCNC: 189 MG/DL (ref 70–130)
GLUCOSE BLDC GLUCOMTR-MCNC: 191 MG/DL (ref 70–130)
GLUCOSE BLDC GLUCOMTR-MCNC: 223 MG/DL (ref 70–130)
GLUCOSE BLDC GLUCOMTR-MCNC: 83 MG/DL (ref 70–130)
GLUCOSE SERPL-MCNC: 161 MG/DL (ref 65–99)
HCT VFR BLD AUTO: 28.4 % (ref 34–46.6)
HGB BLD-MCNC: 9 G/DL (ref 12–15.9)
IMM GRANULOCYTES # BLD AUTO: 0.06 10*3/MM3 (ref 0–0.05)
IMM GRANULOCYTES NFR BLD AUTO: 0.7 % (ref 0–0.5)
LYMPHOCYTES # BLD AUTO: 1.82 10*3/MM3 (ref 0.7–3.1)
LYMPHOCYTES NFR BLD AUTO: 20.6 % (ref 19.6–45.3)
MCH RBC QN AUTO: 26.9 PG (ref 26.6–33)
MCHC RBC AUTO-ENTMCNC: 31.7 G/DL (ref 31.5–35.7)
MCV RBC AUTO: 85 FL (ref 79–97)
MONOCYTES # BLD AUTO: 0.55 10*3/MM3 (ref 0.1–0.9)
MONOCYTES NFR BLD AUTO: 6.2 % (ref 5–12)
NEUTROPHILS NFR BLD AUTO: 6.18 10*3/MM3 (ref 1.7–7)
NEUTROPHILS NFR BLD AUTO: 70 % (ref 42.7–76)
NRBC BLD AUTO-RTO: 0 /100 WBC (ref 0–0.2)
PHOSPHATE SERPL-MCNC: 4.7 MG/DL (ref 2.5–4.5)
PLATELET # BLD AUTO: 171 10*3/MM3 (ref 140–450)
PMV BLD AUTO: 9.8 FL (ref 6–12)
POTASSIUM SERPL-SCNC: 4.3 MMOL/L (ref 3.5–5.2)
RBC # BLD AUTO: 3.34 10*6/MM3 (ref 3.77–5.28)
SODIUM SERPL-SCNC: 132 MMOL/L (ref 136–145)
VANCOMYCIN SERPL-MCNC: 10.9 MCG/ML (ref 5–40)
WBC NRBC COR # BLD: 8.83 10*3/MM3 (ref 3.4–10.8)

## 2022-05-16 PROCEDURE — 25010000002 ONDANSETRON PER 1 MG: Performed by: STUDENT IN AN ORGANIZED HEALTH CARE EDUCATION/TRAINING PROGRAM

## 2022-05-16 PROCEDURE — 97129 THER IVNTJ 1ST 15 MIN: CPT

## 2022-05-16 PROCEDURE — 97110 THERAPEUTIC EXERCISES: CPT

## 2022-05-16 PROCEDURE — 80069 RENAL FUNCTION PANEL: CPT | Performed by: PHYSICAL MEDICINE & REHABILITATION

## 2022-05-16 PROCEDURE — 82962 GLUCOSE BLOOD TEST: CPT

## 2022-05-16 PROCEDURE — 63710000001 INSULIN LISPRO (HUMAN) PER 5 UNITS: Performed by: PHYSICAL MEDICINE & REHABILITATION

## 2022-05-16 PROCEDURE — 97535 SELF CARE MNGMENT TRAINING: CPT

## 2022-05-16 PROCEDURE — 85025 COMPLETE CBC W/AUTO DIFF WBC: CPT | Performed by: PHYSICAL MEDICINE & REHABILITATION

## 2022-05-16 PROCEDURE — 97130 THER IVNTJ EA ADDL 15 MIN: CPT

## 2022-05-16 PROCEDURE — 80202 ASSAY OF VANCOMYCIN: CPT | Performed by: STUDENT IN AN ORGANIZED HEALTH CARE EDUCATION/TRAINING PROGRAM

## 2022-05-16 RX ADMIN — TAMSULOSIN HYDROCHLORIDE 0.4 MG: 0.4 CAPSULE ORAL at 08:35

## 2022-05-16 RX ADMIN — LEVOTHYROXINE SODIUM 125 MCG: 0.12 TABLET ORAL at 05:30

## 2022-05-16 RX ADMIN — Medication 3 MG: at 01:24

## 2022-05-16 RX ADMIN — SERTRALINE 100 MG: 100 TABLET, FILM COATED ORAL at 08:35

## 2022-05-16 RX ADMIN — AMLODIPINE BESYLATE 10 MG: 10 TABLET ORAL at 08:35

## 2022-05-16 RX ADMIN — ONDANSETRON 4 MG: 2 INJECTION INTRAMUSCULAR; INTRAVENOUS at 12:29

## 2022-05-16 RX ADMIN — INSULIN LISPRO 2 UNITS: 100 INJECTION, SOLUTION INTRAVENOUS; SUBCUTANEOUS at 12:29

## 2022-05-16 RX ADMIN — INSULIN LISPRO 5 UNITS: 100 INJECTION, SOLUTION INTRAVENOUS; SUBCUTANEOUS at 21:01

## 2022-05-16 RX ADMIN — HYDRALAZINE HYDROCHLORIDE 100 MG: 50 TABLET, FILM COATED ORAL at 05:32

## 2022-05-16 RX ADMIN — CARVEDILOL 3.12 MG: 3.12 TABLET, FILM COATED ORAL at 17:53

## 2022-05-16 RX ADMIN — INSULIN GLARGINE-YFGN 8 UNITS: 100 INJECTION, SOLUTION SUBCUTANEOUS at 08:35

## 2022-05-16 RX ADMIN — CARVEDILOL 3.12 MG: 3.12 TABLET, FILM COATED ORAL at 08:35

## 2022-05-16 RX ADMIN — GABAPENTIN 300 MG: 300 CAPSULE ORAL at 21:02

## 2022-05-16 RX ADMIN — PANTOPRAZOLE SODIUM 40 MG: 40 TABLET, DELAYED RELEASE ORAL at 05:30

## 2022-05-16 RX ADMIN — ROPINIROLE HYDROCHLORIDE 0.75 MG: 0.5 TABLET, FILM COATED ORAL at 21:02

## 2022-05-16 RX ADMIN — INSULIN LISPRO 3 UNITS: 100 INJECTION, SOLUTION INTRAVENOUS; SUBCUTANEOUS at 08:47

## 2022-05-17 LAB
GLUCOSE BLDC GLUCOMTR-MCNC: 148 MG/DL (ref 70–130)
GLUCOSE BLDC GLUCOMTR-MCNC: 171 MG/DL (ref 70–130)
GLUCOSE BLDC GLUCOMTR-MCNC: 206 MG/DL (ref 70–130)
GLUCOSE BLDC GLUCOMTR-MCNC: 230 MG/DL (ref 70–130)
GLUCOSE BLDC GLUCOMTR-MCNC: 245 MG/DL (ref 70–130)
GLUCOSE BLDC GLUCOMTR-MCNC: 82 MG/DL (ref 70–130)

## 2022-05-17 PROCEDURE — 97110 THERAPEUTIC EXERCISES: CPT

## 2022-05-17 PROCEDURE — 63710000001 INSULIN LISPRO (HUMAN) PER 5 UNITS: Performed by: PHYSICAL MEDICINE & REHABILITATION

## 2022-05-17 PROCEDURE — 97129 THER IVNTJ 1ST 15 MIN: CPT | Performed by: SPEECH-LANGUAGE PATHOLOGIST

## 2022-05-17 PROCEDURE — 97130 THER IVNTJ EA ADDL 15 MIN: CPT | Performed by: SPEECH-LANGUAGE PATHOLOGIST

## 2022-05-17 PROCEDURE — 5A1D70Z PERFORMANCE OF URINARY FILTRATION, INTERMITTENT, LESS THAN 6 HOURS PER DAY: ICD-10-PCS | Performed by: INTERNAL MEDICINE

## 2022-05-17 PROCEDURE — 82962 GLUCOSE BLOOD TEST: CPT

## 2022-05-17 PROCEDURE — 25010000002 VANCOMYCIN PER 500 MG: Performed by: STUDENT IN AN ORGANIZED HEALTH CARE EDUCATION/TRAINING PROGRAM

## 2022-05-17 PROCEDURE — 97535 SELF CARE MNGMENT TRAINING: CPT

## 2022-05-17 RX ORDER — CARVEDILOL 12.5 MG/1
12.5 TABLET ORAL 2 TIMES DAILY WITH MEALS
Status: DISCONTINUED | OUTPATIENT
Start: 2022-05-17 | End: 2022-05-18

## 2022-05-17 RX ORDER — GABAPENTIN 100 MG/1
200 CAPSULE ORAL NIGHTLY
Status: DISCONTINUED | OUTPATIENT
Start: 2022-05-17 | End: 2022-06-04

## 2022-05-17 RX ADMIN — SERTRALINE 100 MG: 100 TABLET, FILM COATED ORAL at 08:11

## 2022-05-17 RX ADMIN — ROPINIROLE HYDROCHLORIDE 0.75 MG: 0.5 TABLET, FILM COATED ORAL at 21:31

## 2022-05-17 RX ADMIN — LEVOTHYROXINE SODIUM 125 MCG: 0.12 TABLET ORAL at 06:03

## 2022-05-17 RX ADMIN — INSULIN LISPRO 5 UNITS: 100 INJECTION, SOLUTION INTRAVENOUS; SUBCUTANEOUS at 08:11

## 2022-05-17 RX ADMIN — HYDRALAZINE HYDROCHLORIDE 100 MG: 50 TABLET, FILM COATED ORAL at 06:03

## 2022-05-17 RX ADMIN — INSULIN LISPRO 5 UNITS: 100 INJECTION, SOLUTION INTRAVENOUS; SUBCUTANEOUS at 11:51

## 2022-05-17 RX ADMIN — INSULIN GLARGINE-YFGN 8 UNITS: 100 INJECTION, SOLUTION SUBCUTANEOUS at 08:11

## 2022-05-17 RX ADMIN — GABAPENTIN 200 MG: 100 CAPSULE ORAL at 21:31

## 2022-05-17 RX ADMIN — Medication 3 MG: at 21:43

## 2022-05-17 RX ADMIN — HYDRALAZINE HYDROCHLORIDE 100 MG: 50 TABLET, FILM COATED ORAL at 21:31

## 2022-05-17 RX ADMIN — AMLODIPINE BESYLATE 10 MG: 10 TABLET ORAL at 16:43

## 2022-05-17 RX ADMIN — INSULIN LISPRO 3 UNITS: 100 INJECTION, SOLUTION INTRAVENOUS; SUBCUTANEOUS at 21:43

## 2022-05-17 RX ADMIN — CARVEDILOL 12.5 MG: 12.5 TABLET, FILM COATED ORAL at 16:42

## 2022-05-17 RX ADMIN — VANCOMYCIN HYDROCHLORIDE 500 MG: 500 INJECTION, POWDER, LYOPHILIZED, FOR SOLUTION INTRAVENOUS at 16:48

## 2022-05-17 RX ADMIN — TAMSULOSIN HYDROCHLORIDE 0.4 MG: 0.4 CAPSULE ORAL at 08:11

## 2022-05-17 RX ADMIN — PANTOPRAZOLE SODIUM 40 MG: 40 TABLET, DELAYED RELEASE ORAL at 06:03

## 2022-05-18 LAB
ALBUMIN SERPL-MCNC: 3.5 G/DL (ref 3.5–5.2)
ANION GAP SERPL CALCULATED.3IONS-SCNC: 10.1 MMOL/L (ref 5–15)
BUN SERPL-MCNC: 31 MG/DL (ref 6–20)
BUN/CREAT SERPL: 11.7 (ref 7–25)
CALCIUM SPEC-SCNC: 8.4 MG/DL (ref 8.6–10.5)
CHLORIDE SERPL-SCNC: 101 MMOL/L (ref 98–107)
CO2 SERPL-SCNC: 20.9 MMOL/L (ref 22–29)
CREAT SERPL-MCNC: 2.66 MG/DL (ref 0.57–1)
EGFRCR SERPLBLD CKD-EPI 2021: 20.5 ML/MIN/1.73
GLUCOSE BLDC GLUCOMTR-MCNC: 105 MG/DL (ref 70–130)
GLUCOSE BLDC GLUCOMTR-MCNC: 191 MG/DL (ref 70–130)
GLUCOSE BLDC GLUCOMTR-MCNC: 236 MG/DL (ref 70–130)
GLUCOSE BLDC GLUCOMTR-MCNC: 277 MG/DL (ref 70–130)
GLUCOSE SERPL-MCNC: 184 MG/DL (ref 65–99)
PHOSPHATE SERPL-MCNC: 3.4 MG/DL (ref 2.5–4.5)
POTASSIUM SERPL-SCNC: 5.4 MMOL/L (ref 3.5–5.2)
SODIUM SERPL-SCNC: 132 MMOL/L (ref 136–145)
VANCOMYCIN SERPL-MCNC: 12.5 MCG/ML (ref 5–40)

## 2022-05-18 PROCEDURE — 82962 GLUCOSE BLOOD TEST: CPT

## 2022-05-18 PROCEDURE — 80069 RENAL FUNCTION PANEL: CPT | Performed by: PHYSICAL MEDICINE & REHABILITATION

## 2022-05-18 PROCEDURE — 97535 SELF CARE MNGMENT TRAINING: CPT | Performed by: OCCUPATIONAL THERAPIST

## 2022-05-18 PROCEDURE — 97130 THER IVNTJ EA ADDL 15 MIN: CPT

## 2022-05-18 PROCEDURE — 80202 ASSAY OF VANCOMYCIN: CPT | Performed by: PHYSICAL MEDICINE & REHABILITATION

## 2022-05-18 PROCEDURE — 63710000001 INSULIN LISPRO (HUMAN) PER 5 UNITS: Performed by: PHYSICAL MEDICINE & REHABILITATION

## 2022-05-18 PROCEDURE — 97129 THER IVNTJ 1ST 15 MIN: CPT

## 2022-05-18 PROCEDURE — 97110 THERAPEUTIC EXERCISES: CPT

## 2022-05-18 RX ORDER — HYDRALAZINE HYDROCHLORIDE 50 MG/1
50 TABLET, FILM COATED ORAL EVERY 8 HOURS SCHEDULED
Status: DISCONTINUED | OUTPATIENT
Start: 2022-05-18 | End: 2022-05-29

## 2022-05-18 RX ORDER — CARVEDILOL 25 MG/1
25 TABLET ORAL 2 TIMES DAILY WITH MEALS
Status: DISCONTINUED | OUTPATIENT
Start: 2022-05-18 | End: 2022-06-03

## 2022-05-18 RX ADMIN — HYDRALAZINE HYDROCHLORIDE 50 MG: 50 TABLET, FILM COATED ORAL at 21:19

## 2022-05-18 RX ADMIN — INSULIN GLARGINE-YFGN 8 UNITS: 100 INJECTION, SOLUTION SUBCUTANEOUS at 07:50

## 2022-05-18 RX ADMIN — SERTRALINE 100 MG: 100 TABLET, FILM COATED ORAL at 07:51

## 2022-05-18 RX ADMIN — CARVEDILOL 25 MG: 25 TABLET, FILM COATED ORAL at 21:19

## 2022-05-18 RX ADMIN — TAMSULOSIN HYDROCHLORIDE 0.4 MG: 0.4 CAPSULE ORAL at 07:51

## 2022-05-18 RX ADMIN — GABAPENTIN 200 MG: 100 CAPSULE ORAL at 21:19

## 2022-05-18 RX ADMIN — PANTOPRAZOLE SODIUM 40 MG: 40 TABLET, DELAYED RELEASE ORAL at 05:40

## 2022-05-18 RX ADMIN — CARVEDILOL 12.5 MG: 12.5 TABLET, FILM COATED ORAL at 07:51

## 2022-05-18 RX ADMIN — INSULIN LISPRO 5 UNITS: 100 INJECTION, SOLUTION INTRAVENOUS; SUBCUTANEOUS at 07:51

## 2022-05-18 RX ADMIN — AMLODIPINE BESYLATE 10 MG: 10 TABLET ORAL at 07:51

## 2022-05-18 RX ADMIN — HYDRALAZINE HYDROCHLORIDE 100 MG: 50 TABLET, FILM COATED ORAL at 05:40

## 2022-05-18 RX ADMIN — INSULIN LISPRO 8 UNITS: 100 INJECTION, SOLUTION INTRAVENOUS; SUBCUTANEOUS at 21:19

## 2022-05-18 RX ADMIN — ROPINIROLE HYDROCHLORIDE 0.75 MG: 0.5 TABLET, FILM COATED ORAL at 21:18

## 2022-05-18 RX ADMIN — DIPHENOXYLATE HYDROCHLORIDE AND ATROPINE SULFATE 1 TABLET: 2.5; .025 TABLET ORAL at 21:20

## 2022-05-18 RX ADMIN — LEVOTHYROXINE SODIUM 125 MCG: 0.12 TABLET ORAL at 05:40

## 2022-05-18 RX ADMIN — INSULIN LISPRO 3 UNITS: 100 INJECTION, SOLUTION INTRAVENOUS; SUBCUTANEOUS at 11:41

## 2022-05-19 LAB
ALBUMIN SERPL-MCNC: 2.8 G/DL (ref 3.5–5.2)
ANION GAP SERPL CALCULATED.3IONS-SCNC: 12 MMOL/L (ref 5–15)
BASOPHILS # BLD AUTO: 0.06 10*3/MM3 (ref 0–0.2)
BASOPHILS NFR BLD AUTO: 0.9 % (ref 0–1.5)
BUN SERPL-MCNC: 42 MG/DL (ref 6–20)
BUN/CREAT SERPL: 12.8 (ref 7–25)
CALCIUM SPEC-SCNC: 7.8 MG/DL (ref 8.6–10.5)
CHLORIDE SERPL-SCNC: 98 MMOL/L (ref 98–107)
CO2 SERPL-SCNC: 18 MMOL/L (ref 22–29)
CREAT SERPL-MCNC: 3.27 MG/DL (ref 0.57–1)
DEPRECATED RDW RBC AUTO: 54.4 FL (ref 37–54)
EGFRCR SERPLBLD CKD-EPI 2021: 16 ML/MIN/1.73
EOSINOPHIL # BLD AUTO: 0.09 10*3/MM3 (ref 0–0.4)
EOSINOPHIL NFR BLD AUTO: 1.3 % (ref 0.3–6.2)
ERYTHROCYTE [DISTWIDTH] IN BLOOD BY AUTOMATED COUNT: 17.7 % (ref 12.3–15.4)
GLUCOSE BLDC GLUCOMTR-MCNC: 146 MG/DL (ref 70–130)
GLUCOSE BLDC GLUCOMTR-MCNC: 169 MG/DL (ref 70–130)
GLUCOSE BLDC GLUCOMTR-MCNC: 190 MG/DL (ref 70–130)
GLUCOSE BLDC GLUCOMTR-MCNC: 203 MG/DL (ref 70–130)
GLUCOSE SERPL-MCNC: 146 MG/DL (ref 65–99)
HCT VFR BLD AUTO: 24.2 % (ref 34–46.6)
HGB BLD-MCNC: 7.7 G/DL (ref 12–15.9)
IMM GRANULOCYTES # BLD AUTO: 0.03 10*3/MM3 (ref 0–0.05)
IMM GRANULOCYTES NFR BLD AUTO: 0.4 % (ref 0–0.5)
LYMPHOCYTES # BLD AUTO: 1.58 10*3/MM3 (ref 0.7–3.1)
LYMPHOCYTES NFR BLD AUTO: 23.3 % (ref 19.6–45.3)
MCH RBC QN AUTO: 27.1 PG (ref 26.6–33)
MCHC RBC AUTO-ENTMCNC: 31.8 G/DL (ref 31.5–35.7)
MCV RBC AUTO: 85.2 FL (ref 79–97)
MONOCYTES # BLD AUTO: 0.57 10*3/MM3 (ref 0.1–0.9)
MONOCYTES NFR BLD AUTO: 8.4 % (ref 5–12)
NEUTROPHILS NFR BLD AUTO: 4.44 10*3/MM3 (ref 1.7–7)
NEUTROPHILS NFR BLD AUTO: 65.7 % (ref 42.7–76)
NRBC BLD AUTO-RTO: 0.1 /100 WBC (ref 0–0.2)
PHOSPHATE SERPL-MCNC: 4.1 MG/DL (ref 2.5–4.5)
PLATELET # BLD AUTO: 162 10*3/MM3 (ref 140–450)
PMV BLD AUTO: 10 FL (ref 6–12)
POTASSIUM SERPL-SCNC: 5.8 MMOL/L (ref 3.5–5.2)
RBC # BLD AUTO: 2.84 10*6/MM3 (ref 3.77–5.28)
SODIUM SERPL-SCNC: 128 MMOL/L (ref 136–145)
WBC NRBC COR # BLD: 6.77 10*3/MM3 (ref 3.4–10.8)

## 2022-05-19 PROCEDURE — 82962 GLUCOSE BLOOD TEST: CPT

## 2022-05-19 PROCEDURE — 25010000002 EPOETIN ALFA-EPBX 10000 UNIT/ML SOLUTION: Performed by: INTERNAL MEDICINE

## 2022-05-19 PROCEDURE — 63710000001 INSULIN LISPRO (HUMAN) PER 5 UNITS: Performed by: PHYSICAL MEDICINE & REHABILITATION

## 2022-05-19 PROCEDURE — 25010000002 VANCOMYCIN PER 500 MG: Performed by: PHYSICAL MEDICINE & REHABILITATION

## 2022-05-19 PROCEDURE — 80069 RENAL FUNCTION PANEL: CPT | Performed by: PHYSICAL MEDICINE & REHABILITATION

## 2022-05-19 PROCEDURE — 85025 COMPLETE CBC W/AUTO DIFF WBC: CPT | Performed by: PHYSICAL MEDICINE & REHABILITATION

## 2022-05-19 PROCEDURE — 97129 THER IVNTJ 1ST 15 MIN: CPT

## 2022-05-19 PROCEDURE — 97130 THER IVNTJ EA ADDL 15 MIN: CPT

## 2022-05-19 RX ADMIN — TAMSULOSIN HYDROCHLORIDE 0.4 MG: 0.4 CAPSULE ORAL at 14:03

## 2022-05-19 RX ADMIN — AMLODIPINE BESYLATE 10 MG: 10 TABLET ORAL at 14:03

## 2022-05-19 RX ADMIN — VANCOMYCIN HYDROCHLORIDE 500 MG: 500 INJECTION, POWDER, LYOPHILIZED, FOR SOLUTION INTRAVENOUS at 14:03

## 2022-05-19 RX ADMIN — INSULIN LISPRO 5 UNITS: 100 INJECTION, SOLUTION INTRAVENOUS; SUBCUTANEOUS at 16:53

## 2022-05-19 RX ADMIN — HYDRALAZINE HYDROCHLORIDE 50 MG: 50 TABLET, FILM COATED ORAL at 14:03

## 2022-05-19 RX ADMIN — GABAPENTIN 200 MG: 100 CAPSULE ORAL at 22:44

## 2022-05-19 RX ADMIN — INSULIN GLARGINE-YFGN 8 UNITS: 100 INJECTION, SOLUTION SUBCUTANEOUS at 08:55

## 2022-05-19 RX ADMIN — ROPINIROLE HYDROCHLORIDE 0.75 MG: 0.5 TABLET, FILM COATED ORAL at 22:44

## 2022-05-19 RX ADMIN — SERTRALINE 100 MG: 100 TABLET, FILM COATED ORAL at 14:03

## 2022-05-19 RX ADMIN — HYDRALAZINE HYDROCHLORIDE 50 MG: 50 TABLET, FILM COATED ORAL at 22:43

## 2022-05-19 RX ADMIN — LEVOTHYROXINE SODIUM 125 MCG: 0.12 TABLET ORAL at 05:27

## 2022-05-19 RX ADMIN — PANTOPRAZOLE SODIUM 40 MG: 40 TABLET, DELAYED RELEASE ORAL at 05:27

## 2022-05-19 RX ADMIN — CARVEDILOL 25 MG: 25 TABLET, FILM COATED ORAL at 16:53

## 2022-05-19 RX ADMIN — EPOETIN ALFA-EPBX 10000 UNITS: 10000 INJECTION, SOLUTION INTRAVENOUS; SUBCUTANEOUS at 09:44

## 2022-05-19 RX ADMIN — INSULIN LISPRO 3 UNITS: 100 INJECTION, SOLUTION INTRAVENOUS; SUBCUTANEOUS at 22:53

## 2022-05-20 LAB
ALBUMIN SERPL-MCNC: 3.2 G/DL (ref 3.5–5.2)
ANION GAP SERPL CALCULATED.3IONS-SCNC: 8 MMOL/L (ref 5–15)
BUN SERPL-MCNC: 27 MG/DL (ref 6–20)
BUN/CREAT SERPL: 11.4 (ref 7–25)
CALCIUM SPEC-SCNC: 8 MG/DL (ref 8.6–10.5)
CHLORIDE SERPL-SCNC: 103 MMOL/L (ref 98–107)
CO2 SERPL-SCNC: 23 MMOL/L (ref 22–29)
CREAT SERPL-MCNC: 2.36 MG/DL (ref 0.57–1)
EGFRCR SERPLBLD CKD-EPI 2021: 23.6 ML/MIN/1.73
GLUCOSE BLDC GLUCOMTR-MCNC: 153 MG/DL (ref 70–130)
GLUCOSE BLDC GLUCOMTR-MCNC: 171 MG/DL (ref 70–130)
GLUCOSE BLDC GLUCOMTR-MCNC: 294 MG/DL (ref 70–130)
GLUCOSE BLDC GLUCOMTR-MCNC: 350 MG/DL (ref 70–130)
GLUCOSE SERPL-MCNC: 153 MG/DL (ref 65–99)
PHOSPHATE SERPL-MCNC: 3.1 MG/DL (ref 2.5–4.5)
POTASSIUM SERPL-SCNC: 4.3 MMOL/L (ref 3.5–5.2)
SODIUM SERPL-SCNC: 134 MMOL/L (ref 136–145)

## 2022-05-20 PROCEDURE — 80069 RENAL FUNCTION PANEL: CPT | Performed by: PHYSICAL MEDICINE & REHABILITATION

## 2022-05-20 PROCEDURE — 97130 THER IVNTJ EA ADDL 15 MIN: CPT

## 2022-05-20 PROCEDURE — 63710000001 INSULIN LISPRO (HUMAN) PER 5 UNITS: Performed by: PHYSICAL MEDICINE & REHABILITATION

## 2022-05-20 PROCEDURE — 97535 SELF CARE MNGMENT TRAINING: CPT

## 2022-05-20 PROCEDURE — 97129 THER IVNTJ 1ST 15 MIN: CPT

## 2022-05-20 PROCEDURE — 82962 GLUCOSE BLOOD TEST: CPT

## 2022-05-20 PROCEDURE — 97110 THERAPEUTIC EXERCISES: CPT

## 2022-05-20 RX ORDER — ASPIRIN 81 MG/1
81 TABLET ORAL DAILY
Status: DISCONTINUED | OUTPATIENT
Start: 2022-05-20 | End: 2022-07-22 | Stop reason: HOSPADM

## 2022-05-20 RX ORDER — SODIUM CHLORIDE 0.9 % (FLUSH) 0.9 %
10 SYRINGE (ML) INJECTION AS NEEDED
Status: DISCONTINUED | OUTPATIENT
Start: 2022-05-20 | End: 2022-07-22 | Stop reason: HOSPADM

## 2022-05-20 RX ORDER — SODIUM CHLORIDE 0.9 % (FLUSH) 0.9 %
10 SYRINGE (ML) INJECTION EVERY 12 HOURS SCHEDULED
Status: DISCONTINUED | OUTPATIENT
Start: 2022-05-20 | End: 2022-05-29

## 2022-05-20 RX ADMIN — LEVOTHYROXINE SODIUM 125 MCG: 0.12 TABLET ORAL at 06:22

## 2022-05-20 RX ADMIN — Medication 3 MG: at 22:19

## 2022-05-20 RX ADMIN — INSULIN LISPRO 3 UNITS: 100 INJECTION, SOLUTION INTRAVENOUS; SUBCUTANEOUS at 12:17

## 2022-05-20 RX ADMIN — CARVEDILOL 25 MG: 25 TABLET, FILM COATED ORAL at 17:24

## 2022-05-20 RX ADMIN — Medication 10 ML: at 17:25

## 2022-05-20 RX ADMIN — TAMSULOSIN HYDROCHLORIDE 0.4 MG: 0.4 CAPSULE ORAL at 07:43

## 2022-05-20 RX ADMIN — INSULIN LISPRO 3 UNITS: 100 INJECTION, SOLUTION INTRAVENOUS; SUBCUTANEOUS at 07:49

## 2022-05-20 RX ADMIN — GABAPENTIN 200 MG: 100 CAPSULE ORAL at 22:19

## 2022-05-20 RX ADMIN — INSULIN GLARGINE-YFGN 8 UNITS: 100 INJECTION, SOLUTION SUBCUTANEOUS at 07:44

## 2022-05-20 RX ADMIN — INSULIN LISPRO 12 UNITS: 100 INJECTION, SOLUTION INTRAVENOUS; SUBCUTANEOUS at 22:18

## 2022-05-20 RX ADMIN — HYDRALAZINE HYDROCHLORIDE 50 MG: 50 TABLET, FILM COATED ORAL at 14:42

## 2022-05-20 RX ADMIN — SERTRALINE 100 MG: 100 TABLET, FILM COATED ORAL at 07:43

## 2022-05-20 RX ADMIN — Medication 10 ML: at 22:20

## 2022-05-20 RX ADMIN — ASPIRIN 81 MG: 81 TABLET, COATED ORAL at 14:44

## 2022-05-20 RX ADMIN — CARVEDILOL 25 MG: 25 TABLET, FILM COATED ORAL at 07:43

## 2022-05-20 RX ADMIN — INSULIN LISPRO 8 UNITS: 100 INJECTION, SOLUTION INTRAVENOUS; SUBCUTANEOUS at 17:24

## 2022-05-20 RX ADMIN — PANTOPRAZOLE SODIUM 40 MG: 40 TABLET, DELAYED RELEASE ORAL at 06:22

## 2022-05-20 RX ADMIN — ROPINIROLE HYDROCHLORIDE 0.75 MG: 0.5 TABLET, FILM COATED ORAL at 22:19

## 2022-05-20 RX ADMIN — HYDRALAZINE HYDROCHLORIDE 50 MG: 50 TABLET, FILM COATED ORAL at 22:19

## 2022-05-20 RX ADMIN — AMLODIPINE BESYLATE 10 MG: 10 TABLET ORAL at 07:43

## 2022-05-20 RX ADMIN — ACETAMINOPHEN 325MG 650 MG: 325 TABLET ORAL at 09:09

## 2022-05-20 RX ADMIN — HYDRALAZINE HYDROCHLORIDE 50 MG: 50 TABLET, FILM COATED ORAL at 06:22

## 2022-05-21 LAB
ALBUMIN SERPL-MCNC: 3.1 G/DL (ref 3.5–5.2)
ANION GAP SERPL CALCULATED.3IONS-SCNC: 11 MMOL/L (ref 5–15)
BASOPHILS # BLD AUTO: 0.04 10*3/MM3 (ref 0–0.2)
BASOPHILS NFR BLD AUTO: 0.6 % (ref 0–1.5)
BUN SERPL-MCNC: 39 MG/DL (ref 6–20)
BUN/CREAT SERPL: 12.6 (ref 7–25)
CALCIUM SPEC-SCNC: 7.9 MG/DL (ref 8.6–10.5)
CHLORIDE SERPL-SCNC: 100 MMOL/L (ref 98–107)
CO2 SERPL-SCNC: 20 MMOL/L (ref 22–29)
CREAT SERPL-MCNC: 3.09 MG/DL (ref 0.57–1)
DEPRECATED RDW RBC AUTO: 54.9 FL (ref 37–54)
EGFRCR SERPLBLD CKD-EPI 2021: 17.1 ML/MIN/1.73
EOSINOPHIL # BLD AUTO: 0.09 10*3/MM3 (ref 0–0.4)
EOSINOPHIL NFR BLD AUTO: 1.5 % (ref 0.3–6.2)
ERYTHROCYTE [DISTWIDTH] IN BLOOD BY AUTOMATED COUNT: 18 % (ref 12.3–15.4)
GLUCOSE BLDC GLUCOMTR-MCNC: 124 MG/DL (ref 70–130)
GLUCOSE BLDC GLUCOMTR-MCNC: 184 MG/DL (ref 70–130)
GLUCOSE BLDC GLUCOMTR-MCNC: 208 MG/DL (ref 70–130)
GLUCOSE BLDC GLUCOMTR-MCNC: 267 MG/DL (ref 70–130)
GLUCOSE BLDC GLUCOMTR-MCNC: 66 MG/DL (ref 70–130)
GLUCOSE BLDC GLUCOMTR-MCNC: 84 MG/DL (ref 70–130)
GLUCOSE BLDC GLUCOMTR-MCNC: 85 MG/DL (ref 70–130)
GLUCOSE SERPL-MCNC: 248 MG/DL (ref 65–99)
HCT VFR BLD AUTO: 22.3 % (ref 34–46.6)
HGB BLD-MCNC: 7.1 G/DL (ref 12–15.9)
IMM GRANULOCYTES # BLD AUTO: 0.02 10*3/MM3 (ref 0–0.05)
IMM GRANULOCYTES NFR BLD AUTO: 0.3 % (ref 0–0.5)
LYMPHOCYTES # BLD AUTO: 1.03 10*3/MM3 (ref 0.7–3.1)
LYMPHOCYTES NFR BLD AUTO: 16.6 % (ref 19.6–45.3)
MCH RBC QN AUTO: 26.7 PG (ref 26.6–33)
MCHC RBC AUTO-ENTMCNC: 31.8 G/DL (ref 31.5–35.7)
MCV RBC AUTO: 83.8 FL (ref 79–97)
MONOCYTES # BLD AUTO: 0.65 10*3/MM3 (ref 0.1–0.9)
MONOCYTES NFR BLD AUTO: 10.5 % (ref 5–12)
NEUTROPHILS NFR BLD AUTO: 4.36 10*3/MM3 (ref 1.7–7)
NEUTROPHILS NFR BLD AUTO: 70.5 % (ref 42.7–76)
NRBC BLD AUTO-RTO: 0 /100 WBC (ref 0–0.2)
PHOSPHATE SERPL-MCNC: 3.6 MG/DL (ref 2.5–4.5)
PLATELET # BLD AUTO: 172 10*3/MM3 (ref 140–450)
PMV BLD AUTO: 10 FL (ref 6–12)
POTASSIUM SERPL-SCNC: 5.3 MMOL/L (ref 3.5–5.2)
RBC # BLD AUTO: 2.66 10*6/MM3 (ref 3.77–5.28)
SODIUM SERPL-SCNC: 131 MMOL/L (ref 136–145)
VANCOMYCIN SERPL-MCNC: 14.8 MCG/ML (ref 5–40)
WBC NRBC COR # BLD: 6.19 10*3/MM3 (ref 3.4–10.8)

## 2022-05-21 PROCEDURE — 97129 THER IVNTJ 1ST 15 MIN: CPT

## 2022-05-21 PROCEDURE — 80069 RENAL FUNCTION PANEL: CPT | Performed by: PHYSICAL MEDICINE & REHABILITATION

## 2022-05-21 PROCEDURE — 63710000001 INSULIN LISPRO (HUMAN) PER 5 UNITS: Performed by: PHYSICAL MEDICINE & REHABILITATION

## 2022-05-21 PROCEDURE — 85025 COMPLETE CBC W/AUTO DIFF WBC: CPT | Performed by: PHYSICAL MEDICINE & REHABILITATION

## 2022-05-21 PROCEDURE — 82962 GLUCOSE BLOOD TEST: CPT

## 2022-05-21 PROCEDURE — 80202 ASSAY OF VANCOMYCIN: CPT | Performed by: PHYSICAL MEDICINE & REHABILITATION

## 2022-05-21 PROCEDURE — 25010000002 VANCOMYCIN PER 500 MG: Performed by: PHYSICAL MEDICINE & REHABILITATION

## 2022-05-21 PROCEDURE — 25010000002 HEPARIN (PORCINE) PER 1000 UNITS: Performed by: STUDENT IN AN ORGANIZED HEALTH CARE EDUCATION/TRAINING PROGRAM

## 2022-05-21 PROCEDURE — 97130 THER IVNTJ EA ADDL 15 MIN: CPT

## 2022-05-21 PROCEDURE — 97535 SELF CARE MNGMENT TRAINING: CPT

## 2022-05-21 PROCEDURE — 25010000002 EPOETIN ALFA-EPBX 10000 UNIT/ML SOLUTION: Performed by: INTERNAL MEDICINE

## 2022-05-21 PROCEDURE — 97110 THERAPEUTIC EXERCISES: CPT

## 2022-05-21 RX ADMIN — Medication 10 ML: at 08:04

## 2022-05-21 RX ADMIN — INSULIN LISPRO 8 UNITS: 100 INJECTION, SOLUTION INTRAVENOUS; SUBCUTANEOUS at 08:04

## 2022-05-21 RX ADMIN — DIPHENOXYLATE HYDROCHLORIDE AND ATROPINE SULFATE 1 TABLET: 2.5; .025 TABLET ORAL at 20:55

## 2022-05-21 RX ADMIN — INSULIN GLARGINE-YFGN 8 UNITS: 100 INJECTION, SOLUTION SUBCUTANEOUS at 08:04

## 2022-05-21 RX ADMIN — TAMSULOSIN HYDROCHLORIDE 0.4 MG: 0.4 CAPSULE ORAL at 08:04

## 2022-05-21 RX ADMIN — SERTRALINE 100 MG: 100 TABLET, FILM COATED ORAL at 08:04

## 2022-05-21 RX ADMIN — INSULIN LISPRO 3 UNITS: 100 INJECTION, SOLUTION INTRAVENOUS; SUBCUTANEOUS at 20:55

## 2022-05-21 RX ADMIN — EPOETIN ALFA-EPBX 10000 UNITS: 10000 INJECTION, SOLUTION INTRAVENOUS; SUBCUTANEOUS at 16:43

## 2022-05-21 RX ADMIN — Medication 3 MG: at 23:13

## 2022-05-21 RX ADMIN — Medication 10 ML: at 20:55

## 2022-05-21 RX ADMIN — HEPARIN SODIUM 3800 UNITS: 1000 INJECTION INTRAVENOUS; SUBCUTANEOUS at 16:40

## 2022-05-21 RX ADMIN — ASPIRIN 81 MG: 81 TABLET, COATED ORAL at 08:04

## 2022-05-21 RX ADMIN — GABAPENTIN 200 MG: 100 CAPSULE ORAL at 20:55

## 2022-05-21 RX ADMIN — LEVOTHYROXINE SODIUM 125 MCG: 0.12 TABLET ORAL at 06:19

## 2022-05-21 RX ADMIN — ACETAMINOPHEN 325MG 650 MG: 325 TABLET ORAL at 00:30

## 2022-05-21 RX ADMIN — VANCOMYCIN HYDROCHLORIDE 500 MG: 500 INJECTION, POWDER, LYOPHILIZED, FOR SOLUTION INTRAVENOUS at 18:33

## 2022-05-21 RX ADMIN — HYDRALAZINE HYDROCHLORIDE 50 MG: 50 TABLET, FILM COATED ORAL at 06:19

## 2022-05-21 RX ADMIN — ROPINIROLE HYDROCHLORIDE 0.75 MG: 0.5 TABLET, FILM COATED ORAL at 20:55

## 2022-05-21 RX ADMIN — HYDRALAZINE HYDROCHLORIDE 50 MG: 50 TABLET, FILM COATED ORAL at 20:55

## 2022-05-21 RX ADMIN — PANTOPRAZOLE SODIUM 40 MG: 40 TABLET, DELAYED RELEASE ORAL at 06:19

## 2022-05-21 RX ADMIN — ACETAMINOPHEN 325MG 650 MG: 325 TABLET ORAL at 20:54

## 2022-05-21 RX ADMIN — CARVEDILOL 25 MG: 25 TABLET, FILM COATED ORAL at 18:33

## 2022-05-22 LAB
ALBUMIN SERPL-MCNC: 3.5 G/DL (ref 3.5–5.2)
ANION GAP SERPL CALCULATED.3IONS-SCNC: 9 MMOL/L (ref 5–15)
BUN SERPL-MCNC: 22 MG/DL (ref 6–20)
BUN/CREAT SERPL: 10 (ref 7–25)
CALCIUM SPEC-SCNC: 8.5 MG/DL (ref 8.6–10.5)
CHLORIDE SERPL-SCNC: 96 MMOL/L (ref 98–107)
CO2 SERPL-SCNC: 24 MMOL/L (ref 22–29)
CREAT SERPL-MCNC: 2.19 MG/DL (ref 0.57–1)
EGFRCR SERPLBLD CKD-EPI 2021: 25.9 ML/MIN/1.73
GLUCOSE BLDC GLUCOMTR-MCNC: 111 MG/DL (ref 70–130)
GLUCOSE BLDC GLUCOMTR-MCNC: 131 MG/DL (ref 70–130)
GLUCOSE BLDC GLUCOMTR-MCNC: 169 MG/DL (ref 70–130)
GLUCOSE BLDC GLUCOMTR-MCNC: 255 MG/DL (ref 70–130)
GLUCOSE SERPL-MCNC: 182 MG/DL (ref 65–99)
PHOSPHATE SERPL-MCNC: 2.9 MG/DL (ref 2.5–4.5)
POTASSIUM SERPL-SCNC: 4.7 MMOL/L (ref 3.5–5.2)
SODIUM SERPL-SCNC: 129 MMOL/L (ref 136–145)

## 2022-05-22 PROCEDURE — 63710000001 INSULIN LISPRO (HUMAN) PER 5 UNITS: Performed by: PHYSICAL MEDICINE & REHABILITATION

## 2022-05-22 PROCEDURE — 82962 GLUCOSE BLOOD TEST: CPT

## 2022-05-22 PROCEDURE — 80069 RENAL FUNCTION PANEL: CPT | Performed by: PHYSICAL MEDICINE & REHABILITATION

## 2022-05-22 RX ORDER — LIDOCAINE 50 MG/G
1 PATCH TOPICAL
Status: DISCONTINUED | OUTPATIENT
Start: 2022-05-22 | End: 2022-07-22 | Stop reason: HOSPADM

## 2022-05-22 RX ADMIN — HYDRALAZINE HYDROCHLORIDE 50 MG: 50 TABLET, FILM COATED ORAL at 13:17

## 2022-05-22 RX ADMIN — INSULIN LISPRO 8 UNITS: 100 INJECTION, SOLUTION INTRAVENOUS; SUBCUTANEOUS at 17:02

## 2022-05-22 RX ADMIN — ROPINIROLE HYDROCHLORIDE 0.75 MG: 0.5 TABLET, FILM COATED ORAL at 20:27

## 2022-05-22 RX ADMIN — HYDRALAZINE HYDROCHLORIDE 50 MG: 50 TABLET, FILM COATED ORAL at 20:27

## 2022-05-22 RX ADMIN — CARVEDILOL 25 MG: 25 TABLET, FILM COATED ORAL at 17:02

## 2022-05-22 RX ADMIN — PANTOPRAZOLE SODIUM 40 MG: 40 TABLET, DELAYED RELEASE ORAL at 05:25

## 2022-05-22 RX ADMIN — ACETAMINOPHEN 325MG 650 MG: 325 TABLET ORAL at 14:15

## 2022-05-22 RX ADMIN — Medication 10 ML: at 08:24

## 2022-05-22 RX ADMIN — ASPIRIN 81 MG: 81 TABLET, COATED ORAL at 08:24

## 2022-05-22 RX ADMIN — TAMSULOSIN HYDROCHLORIDE 0.4 MG: 0.4 CAPSULE ORAL at 08:23

## 2022-05-22 RX ADMIN — INSULIN LISPRO 3 UNITS: 100 INJECTION, SOLUTION INTRAVENOUS; SUBCUTANEOUS at 11:34

## 2022-05-22 RX ADMIN — HYDRALAZINE HYDROCHLORIDE 50 MG: 50 TABLET, FILM COATED ORAL at 05:25

## 2022-05-22 RX ADMIN — CARVEDILOL 25 MG: 25 TABLET, FILM COATED ORAL at 08:23

## 2022-05-22 RX ADMIN — Medication 10 ML: at 20:32

## 2022-05-22 RX ADMIN — AMLODIPINE BESYLATE 10 MG: 10 TABLET ORAL at 08:23

## 2022-05-22 RX ADMIN — LEVOTHYROXINE SODIUM 125 MCG: 0.12 TABLET ORAL at 05:25

## 2022-05-22 RX ADMIN — LIDOCAINE 1 PATCH: 50 PATCH CUTANEOUS at 14:15

## 2022-05-22 RX ADMIN — ACETAMINOPHEN 325MG 650 MG: 325 TABLET ORAL at 20:27

## 2022-05-22 RX ADMIN — SERTRALINE 100 MG: 100 TABLET, FILM COATED ORAL at 08:24

## 2022-05-22 RX ADMIN — Medication 3 MG: at 20:27

## 2022-05-22 RX ADMIN — GABAPENTIN 200 MG: 100 CAPSULE ORAL at 20:27

## 2022-05-22 RX ADMIN — ACETAMINOPHEN 325MG 650 MG: 325 TABLET ORAL at 09:04

## 2022-05-22 RX ADMIN — INSULIN GLARGINE-YFGN 8 UNITS: 100 INJECTION, SOLUTION SUBCUTANEOUS at 08:23

## 2022-05-23 ENCOUNTER — APPOINTMENT (OUTPATIENT)
Dept: GENERAL RADIOLOGY | Facility: HOSPITAL | Age: 57
End: 2022-05-23

## 2022-05-23 LAB
ALBUMIN SERPL-MCNC: 3.4 G/DL (ref 3.5–5.2)
ANION GAP SERPL CALCULATED.3IONS-SCNC: 12.3 MMOL/L (ref 5–15)
BASOPHILS # BLD AUTO: 0.04 10*3/MM3 (ref 0–0.2)
BASOPHILS NFR BLD AUTO: 0.5 % (ref 0–1.5)
BUN SERPL-MCNC: 30 MG/DL (ref 6–20)
BUN/CREAT SERPL: 9.8 (ref 7–25)
CALCIUM SPEC-SCNC: 8.5 MG/DL (ref 8.6–10.5)
CHLORIDE SERPL-SCNC: 97 MMOL/L (ref 98–107)
CO2 SERPL-SCNC: 21.7 MMOL/L (ref 22–29)
CREAT SERPL-MCNC: 3.05 MG/DL (ref 0.57–1)
DEPRECATED RDW RBC AUTO: 52.9 FL (ref 37–54)
EGFRCR SERPLBLD CKD-EPI 2021: 17.4 ML/MIN/1.73
EOSINOPHIL # BLD AUTO: 0.07 10*3/MM3 (ref 0–0.4)
EOSINOPHIL NFR BLD AUTO: 0.9 % (ref 0.3–6.2)
ERYTHROCYTE [DISTWIDTH] IN BLOOD BY AUTOMATED COUNT: 17.7 % (ref 12.3–15.4)
GLUCOSE BLDC GLUCOMTR-MCNC: 117 MG/DL (ref 70–130)
GLUCOSE BLDC GLUCOMTR-MCNC: 140 MG/DL (ref 70–130)
GLUCOSE BLDC GLUCOMTR-MCNC: 178 MG/DL (ref 70–130)
GLUCOSE BLDC GLUCOMTR-MCNC: 182 MG/DL (ref 70–130)
GLUCOSE SERPL-MCNC: 132 MG/DL (ref 65–99)
HCT VFR BLD AUTO: 23.2 % (ref 34–46.6)
HGB BLD-MCNC: 7.8 G/DL (ref 12–15.9)
IMM GRANULOCYTES # BLD AUTO: 0.03 10*3/MM3 (ref 0–0.05)
IMM GRANULOCYTES NFR BLD AUTO: 0.4 % (ref 0–0.5)
LYMPHOCYTES # BLD AUTO: 0.94 10*3/MM3 (ref 0.7–3.1)
LYMPHOCYTES NFR BLD AUTO: 12.7 % (ref 19.6–45.3)
MCH RBC QN AUTO: 27.9 PG (ref 26.6–33)
MCHC RBC AUTO-ENTMCNC: 33.6 G/DL (ref 31.5–35.7)
MCV RBC AUTO: 82.9 FL (ref 79–97)
MONOCYTES # BLD AUTO: 0.89 10*3/MM3 (ref 0.1–0.9)
MONOCYTES NFR BLD AUTO: 12 % (ref 5–12)
NEUTROPHILS NFR BLD AUTO: 5.43 10*3/MM3 (ref 1.7–7)
NEUTROPHILS NFR BLD AUTO: 73.5 % (ref 42.7–76)
NRBC BLD AUTO-RTO: 0 /100 WBC (ref 0–0.2)
PHOSPHATE SERPL-MCNC: 4.3 MG/DL (ref 2.5–4.5)
PLATELET # BLD AUTO: 213 10*3/MM3 (ref 140–450)
PMV BLD AUTO: 10.2 FL (ref 6–12)
POTASSIUM SERPL-SCNC: 5.2 MMOL/L (ref 3.5–5.2)
RBC # BLD AUTO: 2.8 10*6/MM3 (ref 3.77–5.28)
SODIUM SERPL-SCNC: 131 MMOL/L (ref 136–145)
WBC NRBC COR # BLD: 7.4 10*3/MM3 (ref 3.4–10.8)

## 2022-05-23 PROCEDURE — 80069 RENAL FUNCTION PANEL: CPT | Performed by: PHYSICAL MEDICINE & REHABILITATION

## 2022-05-23 PROCEDURE — 73502 X-RAY EXAM HIP UNI 2-3 VIEWS: CPT

## 2022-05-23 PROCEDURE — 97110 THERAPEUTIC EXERCISES: CPT

## 2022-05-23 PROCEDURE — 82962 GLUCOSE BLOOD TEST: CPT

## 2022-05-23 PROCEDURE — 97535 SELF CARE MNGMENT TRAINING: CPT

## 2022-05-23 PROCEDURE — 97130 THER IVNTJ EA ADDL 15 MIN: CPT

## 2022-05-23 PROCEDURE — 85025 COMPLETE CBC W/AUTO DIFF WBC: CPT | Performed by: PHYSICAL MEDICINE & REHABILITATION

## 2022-05-23 PROCEDURE — 63710000001 INSULIN LISPRO (HUMAN) PER 5 UNITS: Performed by: PHYSICAL MEDICINE & REHABILITATION

## 2022-05-23 PROCEDURE — 97129 THER IVNTJ 1ST 15 MIN: CPT

## 2022-05-23 RX ORDER — ALBUMIN (HUMAN) 12.5 G/50ML
12.5 SOLUTION INTRAVENOUS AS NEEDED
Status: CANCELLED | OUTPATIENT
Start: 2022-05-24 | End: 2022-05-24

## 2022-05-23 RX ADMIN — CARVEDILOL 25 MG: 25 TABLET, FILM COATED ORAL at 07:53

## 2022-05-23 RX ADMIN — GABAPENTIN 200 MG: 100 CAPSULE ORAL at 20:49

## 2022-05-23 RX ADMIN — ACETAMINOPHEN 325MG 650 MG: 325 TABLET ORAL at 20:49

## 2022-05-23 RX ADMIN — HYDRALAZINE HYDROCHLORIDE 50 MG: 50 TABLET, FILM COATED ORAL at 05:03

## 2022-05-23 RX ADMIN — INSULIN LISPRO 3 UNITS: 100 INJECTION, SOLUTION INTRAVENOUS; SUBCUTANEOUS at 20:49

## 2022-05-23 RX ADMIN — Medication 10 ML: at 07:55

## 2022-05-23 RX ADMIN — TAMSULOSIN HYDROCHLORIDE 0.4 MG: 0.4 CAPSULE ORAL at 07:53

## 2022-05-23 RX ADMIN — PANTOPRAZOLE SODIUM 40 MG: 40 TABLET, DELAYED RELEASE ORAL at 05:03

## 2022-05-23 RX ADMIN — INSULIN GLARGINE-YFGN 8 UNITS: 100 INJECTION, SOLUTION SUBCUTANEOUS at 07:52

## 2022-05-23 RX ADMIN — HYDRALAZINE HYDROCHLORIDE 50 MG: 50 TABLET, FILM COATED ORAL at 20:49

## 2022-05-23 RX ADMIN — Medication 10 ML: at 20:50

## 2022-05-23 RX ADMIN — ASPIRIN 81 MG: 81 TABLET, COATED ORAL at 07:53

## 2022-05-23 RX ADMIN — LEVOTHYROXINE SODIUM 125 MCG: 0.12 TABLET ORAL at 05:03

## 2022-05-23 RX ADMIN — LIDOCAINE 1 PATCH: 50 PATCH CUTANEOUS at 07:54

## 2022-05-23 RX ADMIN — CARVEDILOL 25 MG: 25 TABLET, FILM COATED ORAL at 17:18

## 2022-05-23 RX ADMIN — SERTRALINE 100 MG: 100 TABLET, FILM COATED ORAL at 07:53

## 2022-05-23 RX ADMIN — INSULIN LISPRO 3 UNITS: 100 INJECTION, SOLUTION INTRAVENOUS; SUBCUTANEOUS at 17:18

## 2022-05-23 RX ADMIN — AMLODIPINE BESYLATE 10 MG: 10 TABLET ORAL at 07:53

## 2022-05-23 RX ADMIN — ACETAMINOPHEN 325MG 650 MG: 325 TABLET ORAL at 15:24

## 2022-05-23 RX ADMIN — ACETAMINOPHEN 325MG 650 MG: 325 TABLET ORAL at 05:03

## 2022-05-23 RX ADMIN — ROPINIROLE HYDROCHLORIDE 0.75 MG: 0.5 TABLET, FILM COATED ORAL at 20:49

## 2022-05-23 RX ADMIN — HYDRALAZINE HYDROCHLORIDE 50 MG: 50 TABLET, FILM COATED ORAL at 13:38

## 2022-05-24 LAB
ALBUMIN SERPL-MCNC: 3.1 G/DL (ref 3.5–5.2)
ANION GAP SERPL CALCULATED.3IONS-SCNC: 13.4 MMOL/L (ref 5–15)
BUN SERPL-MCNC: 36 MG/DL (ref 6–20)
BUN/CREAT SERPL: 9.6 (ref 7–25)
CALCIUM SPEC-SCNC: 8.8 MG/DL (ref 8.6–10.5)
CHLORIDE SERPL-SCNC: 95 MMOL/L (ref 98–107)
CO2 SERPL-SCNC: 20.6 MMOL/L (ref 22–29)
CREAT SERPL-MCNC: 3.76 MG/DL (ref 0.57–1)
EGFRCR SERPLBLD CKD-EPI 2021: 13.5 ML/MIN/1.73
GLUCOSE BLDC GLUCOMTR-MCNC: 167 MG/DL (ref 70–130)
GLUCOSE BLDC GLUCOMTR-MCNC: 195 MG/DL (ref 70–130)
GLUCOSE BLDC GLUCOMTR-MCNC: 268 MG/DL (ref 70–130)
GLUCOSE BLDC GLUCOMTR-MCNC: 309 MG/DL (ref 70–130)
GLUCOSE BLDC GLUCOMTR-MCNC: 59 MG/DL (ref 70–130)
GLUCOSE BLDC GLUCOMTR-MCNC: 90 MG/DL (ref 70–130)
GLUCOSE BLDC GLUCOMTR-MCNC: 96 MG/DL (ref 70–130)
GLUCOSE SERPL-MCNC: 181 MG/DL (ref 65–99)
PHOSPHATE SERPL-MCNC: 5.6 MG/DL (ref 2.5–4.5)
POTASSIUM SERPL-SCNC: 6 MMOL/L (ref 3.5–5.2)
SODIUM SERPL-SCNC: 129 MMOL/L (ref 136–145)
VANCOMYCIN SERPL-MCNC: 12.9 MCG/ML (ref 5–40)

## 2022-05-24 PROCEDURE — 63710000001 INSULIN LISPRO (HUMAN) PER 5 UNITS: Performed by: PHYSICAL MEDICINE & REHABILITATION

## 2022-05-24 PROCEDURE — 97130 THER IVNTJ EA ADDL 15 MIN: CPT

## 2022-05-24 PROCEDURE — 82962 GLUCOSE BLOOD TEST: CPT

## 2022-05-24 PROCEDURE — 80202 ASSAY OF VANCOMYCIN: CPT | Performed by: PHYSICAL MEDICINE & REHABILITATION

## 2022-05-24 PROCEDURE — 25010000002 EPOETIN ALFA-EPBX 10000 UNIT/ML SOLUTION: Performed by: INTERNAL MEDICINE

## 2022-05-24 PROCEDURE — 97535 SELF CARE MNGMENT TRAINING: CPT

## 2022-05-24 PROCEDURE — 97129 THER IVNTJ 1ST 15 MIN: CPT

## 2022-05-24 PROCEDURE — 97110 THERAPEUTIC EXERCISES: CPT

## 2022-05-24 PROCEDURE — 80069 RENAL FUNCTION PANEL: CPT | Performed by: PHYSICAL MEDICINE & REHABILITATION

## 2022-05-24 PROCEDURE — 25010000002 VANCOMYCIN PER 500 MG: Performed by: PHYSICAL MEDICINE & REHABILITATION

## 2022-05-24 PROCEDURE — 25010000002 HEPARIN (PORCINE) PER 1000 UNITS: Performed by: STUDENT IN AN ORGANIZED HEALTH CARE EDUCATION/TRAINING PROGRAM

## 2022-05-24 RX ORDER — INSULIN LISPRO 100 [IU]/ML
0-7 INJECTION, SOLUTION INTRAVENOUS; SUBCUTANEOUS
Status: DISCONTINUED | OUTPATIENT
Start: 2022-05-24 | End: 2022-06-22

## 2022-05-24 RX ADMIN — SERTRALINE 100 MG: 100 TABLET, FILM COATED ORAL at 07:29

## 2022-05-24 RX ADMIN — HYDRALAZINE HYDROCHLORIDE 50 MG: 50 TABLET, FILM COATED ORAL at 05:13

## 2022-05-24 RX ADMIN — LEVOTHYROXINE SODIUM 125 MCG: 0.12 TABLET ORAL at 05:13

## 2022-05-24 RX ADMIN — ACETAMINOPHEN 325MG 650 MG: 325 TABLET ORAL at 10:38

## 2022-05-24 RX ADMIN — ROPINIROLE HYDROCHLORIDE 0.75 MG: 0.5 TABLET, FILM COATED ORAL at 21:19

## 2022-05-24 RX ADMIN — EPOETIN ALFA-EPBX 10000 UNITS: 10000 INJECTION, SOLUTION INTRAVENOUS; SUBCUTANEOUS at 14:30

## 2022-05-24 RX ADMIN — INSULIN LISPRO 3 UNITS: 100 INJECTION, SOLUTION INTRAVENOUS; SUBCUTANEOUS at 07:28

## 2022-05-24 RX ADMIN — HEPARIN SODIUM 3800 UNITS: 1000 INJECTION INTRAVENOUS; SUBCUTANEOUS at 17:18

## 2022-05-24 RX ADMIN — VANCOMYCIN HYDROCHLORIDE 500 MG: 500 INJECTION, POWDER, LYOPHILIZED, FOR SOLUTION INTRAVENOUS at 18:18

## 2022-05-24 RX ADMIN — ACETAMINOPHEN 325MG 650 MG: 325 TABLET ORAL at 21:19

## 2022-05-24 RX ADMIN — PANTOPRAZOLE SODIUM 40 MG: 40 TABLET, DELAYED RELEASE ORAL at 05:13

## 2022-05-24 RX ADMIN — HYDRALAZINE HYDROCHLORIDE 50 MG: 50 TABLET, FILM COATED ORAL at 21:19

## 2022-05-24 RX ADMIN — CARVEDILOL 25 MG: 25 TABLET, FILM COATED ORAL at 18:00

## 2022-05-24 RX ADMIN — INSULIN LISPRO 4 UNITS: 100 INJECTION, SOLUTION INTRAVENOUS; SUBCUTANEOUS at 11:40

## 2022-05-24 RX ADMIN — Medication 10 ML: at 21:21

## 2022-05-24 RX ADMIN — Medication 10 ML: at 08:26

## 2022-05-24 RX ADMIN — GABAPENTIN 200 MG: 100 CAPSULE ORAL at 21:19

## 2022-05-24 RX ADMIN — DIPHENOXYLATE HYDROCHLORIDE AND ATROPINE SULFATE 1 TABLET: 2.5; .025 TABLET ORAL at 08:26

## 2022-05-24 RX ADMIN — LIDOCAINE 1 PATCH: 50 PATCH CUTANEOUS at 07:28

## 2022-05-24 RX ADMIN — INSULIN GLARGINE-YFGN 8 UNITS: 100 INJECTION, SOLUTION SUBCUTANEOUS at 07:27

## 2022-05-24 RX ADMIN — Medication 3 MG: at 21:19

## 2022-05-24 RX ADMIN — TAMSULOSIN HYDROCHLORIDE 0.4 MG: 0.4 CAPSULE ORAL at 07:29

## 2022-05-24 RX ADMIN — ASPIRIN 81 MG: 81 TABLET, COATED ORAL at 07:28

## 2022-05-25 ENCOUNTER — APPOINTMENT (OUTPATIENT)
Dept: CARDIOLOGY | Facility: HOSPITAL | Age: 57
End: 2022-05-25

## 2022-05-25 ENCOUNTER — APPOINTMENT (OUTPATIENT)
Dept: CT IMAGING | Facility: HOSPITAL | Age: 57
End: 2022-05-25

## 2022-05-25 LAB
ALBUMIN SERPL-MCNC: 3.3 G/DL (ref 3.5–5.2)
ANION GAP SERPL CALCULATED.3IONS-SCNC: 12 MMOL/L (ref 5–15)
BH CV LOWER VASCULAR LEFT COMMON FEMORAL AUGMENT: NORMAL
BH CV LOWER VASCULAR LEFT COMMON FEMORAL COMPETENT: NORMAL
BH CV LOWER VASCULAR LEFT COMMON FEMORAL COMPRESS: NORMAL
BH CV LOWER VASCULAR LEFT COMMON FEMORAL PHASIC: NORMAL
BH CV LOWER VASCULAR LEFT COMMON FEMORAL SPONT: NORMAL
BH CV LOWER VASCULAR LEFT DISTAL FEMORAL COMPRESS: NORMAL
BH CV LOWER VASCULAR LEFT GASTRONEMIUS COMPRESS: NORMAL
BH CV LOWER VASCULAR LEFT GREATER SAPH AK COMPRESS: NORMAL
BH CV LOWER VASCULAR LEFT GREATER SAPH BK COMPRESS: NORMAL
BH CV LOWER VASCULAR LEFT LESSER SAPH COMPRESS: NORMAL
BH CV LOWER VASCULAR LEFT MID FEMORAL AUGMENT: NORMAL
BH CV LOWER VASCULAR LEFT MID FEMORAL COMPETENT: NORMAL
BH CV LOWER VASCULAR LEFT MID FEMORAL COMPRESS: NORMAL
BH CV LOWER VASCULAR LEFT MID FEMORAL PHASIC: NORMAL
BH CV LOWER VASCULAR LEFT MID FEMORAL SPONT: NORMAL
BH CV LOWER VASCULAR LEFT PERONEAL COMPRESS: NORMAL
BH CV LOWER VASCULAR LEFT POPLITEAL AUGMENT: NORMAL
BH CV LOWER VASCULAR LEFT POPLITEAL COMPETENT: NORMAL
BH CV LOWER VASCULAR LEFT POPLITEAL COMPRESS: NORMAL
BH CV LOWER VASCULAR LEFT POPLITEAL PHASIC: NORMAL
BH CV LOWER VASCULAR LEFT POPLITEAL SPONT: NORMAL
BH CV LOWER VASCULAR LEFT POSTERIOR TIBIAL COMPRESS: NORMAL
BH CV LOWER VASCULAR LEFT PROFUNDA FEMORAL COMPRESS: NORMAL
BH CV LOWER VASCULAR LEFT PROXIMAL FEMORAL COMPRESS: NORMAL
BH CV LOWER VASCULAR LEFT SAPHENOFEMORAL JUNCTION COMPRESS: NORMAL
BH CV LOWER VASCULAR RIGHT COMMON FEMORAL AUGMENT: NORMAL
BH CV LOWER VASCULAR RIGHT COMMON FEMORAL COMPETENT: NORMAL
BH CV LOWER VASCULAR RIGHT COMMON FEMORAL COMPRESS: NORMAL
BH CV LOWER VASCULAR RIGHT COMMON FEMORAL PHASIC: NORMAL
BH CV LOWER VASCULAR RIGHT COMMON FEMORAL SPONT: NORMAL
BH CV LOWER VASCULAR RIGHT DISTAL FEMORAL COMPRESS: NORMAL
BH CV LOWER VASCULAR RIGHT GASTRONEMIUS COMPRESS: NORMAL
BH CV LOWER VASCULAR RIGHT GREATER SAPH AK COMPRESS: NORMAL
BH CV LOWER VASCULAR RIGHT GREATER SAPH BK COMPRESS: NORMAL
BH CV LOWER VASCULAR RIGHT LESSER SAPH COMPRESS: NORMAL
BH CV LOWER VASCULAR RIGHT MID FEMORAL AUGMENT: NORMAL
BH CV LOWER VASCULAR RIGHT MID FEMORAL COMPETENT: NORMAL
BH CV LOWER VASCULAR RIGHT MID FEMORAL COMPRESS: NORMAL
BH CV LOWER VASCULAR RIGHT MID FEMORAL PHASIC: NORMAL
BH CV LOWER VASCULAR RIGHT MID FEMORAL SPONT: NORMAL
BH CV LOWER VASCULAR RIGHT PERONEAL COMPRESS: NORMAL
BH CV LOWER VASCULAR RIGHT POPLITEAL AUGMENT: NORMAL
BH CV LOWER VASCULAR RIGHT POPLITEAL COMPETENT: NORMAL
BH CV LOWER VASCULAR RIGHT POPLITEAL COMPRESS: NORMAL
BH CV LOWER VASCULAR RIGHT POPLITEAL PHASIC: NORMAL
BH CV LOWER VASCULAR RIGHT POPLITEAL SPONT: NORMAL
BH CV LOWER VASCULAR RIGHT POSTERIOR TIBIAL COMPRESS: NORMAL
BH CV LOWER VASCULAR RIGHT PROFUNDA FEMORAL COMPRESS: NORMAL
BH CV LOWER VASCULAR RIGHT PROXIMAL FEMORAL COMPRESS: NORMAL
BH CV LOWER VASCULAR RIGHT SAPHENOFEMORAL JUNCTION COMPRESS: NORMAL
BUN SERPL-MCNC: 16 MG/DL (ref 6–20)
BUN/CREAT SERPL: 6.8 (ref 7–25)
CALCIUM SPEC-SCNC: 8 MG/DL (ref 8.6–10.5)
CHLORIDE SERPL-SCNC: 100 MMOL/L (ref 98–107)
CO2 SERPL-SCNC: 24 MMOL/L (ref 22–29)
CREAT SERPL-MCNC: 2.37 MG/DL (ref 0.57–1)
EGFRCR SERPLBLD CKD-EPI 2021: 23.5 ML/MIN/1.73
GLUCOSE BLDC GLUCOMTR-MCNC: 111 MG/DL (ref 70–130)
GLUCOSE BLDC GLUCOMTR-MCNC: 203 MG/DL (ref 70–130)
GLUCOSE BLDC GLUCOMTR-MCNC: 263 MG/DL (ref 70–130)
GLUCOSE BLDC GLUCOMTR-MCNC: 267 MG/DL (ref 70–130)
GLUCOSE BLDC GLUCOMTR-MCNC: 268 MG/DL (ref 70–130)
GLUCOSE SERPL-MCNC: 107 MG/DL (ref 65–99)
MAXIMAL PREDICTED HEART RATE: 164 BPM
PHOSPHATE SERPL-MCNC: 3.6 MG/DL (ref 2.5–4.5)
POTASSIUM SERPL-SCNC: 4.1 MMOL/L (ref 3.5–5.2)
SODIUM SERPL-SCNC: 136 MMOL/L (ref 136–145)
STRESS TARGET HR: 139 BPM

## 2022-05-25 PROCEDURE — 97129 THER IVNTJ 1ST 15 MIN: CPT

## 2022-05-25 PROCEDURE — 93970 EXTREMITY STUDY: CPT

## 2022-05-25 PROCEDURE — 97110 THERAPEUTIC EXERCISES: CPT

## 2022-05-25 PROCEDURE — 63710000001 INSULIN LISPRO (HUMAN) PER 5 UNITS: Performed by: PHYSICAL MEDICINE & REHABILITATION

## 2022-05-25 PROCEDURE — 73700 CT LOWER EXTREMITY W/O DYE: CPT

## 2022-05-25 PROCEDURE — 97535 SELF CARE MNGMENT TRAINING: CPT

## 2022-05-25 PROCEDURE — 80069 RENAL FUNCTION PANEL: CPT | Performed by: PHYSICAL MEDICINE & REHABILITATION

## 2022-05-25 PROCEDURE — 97130 THER IVNTJ EA ADDL 15 MIN: CPT

## 2022-05-25 PROCEDURE — 63710000001 INSULIN LISPRO (HUMAN) PER 5 UNITS: Performed by: STUDENT IN AN ORGANIZED HEALTH CARE EDUCATION/TRAINING PROGRAM

## 2022-05-25 PROCEDURE — 82962 GLUCOSE BLOOD TEST: CPT

## 2022-05-25 RX ORDER — OXYCODONE HYDROCHLORIDE 5 MG/1
2.5 TABLET ORAL ONCE
Status: COMPLETED | OUTPATIENT
Start: 2022-05-25 | End: 2022-05-25

## 2022-05-25 RX ORDER — INSULIN LISPRO 100 [IU]/ML
5 INJECTION, SOLUTION INTRAVENOUS; SUBCUTANEOUS ONCE
Status: COMPLETED | OUTPATIENT
Start: 2022-05-25 | End: 2022-05-25

## 2022-05-25 RX ORDER — ALBUMIN (HUMAN) 12.5 G/50ML
12.5 SOLUTION INTRAVENOUS AS NEEDED
Status: CANCELLED | OUTPATIENT
Start: 2022-05-26 | End: 2022-05-26

## 2022-05-25 RX ADMIN — LEVOTHYROXINE SODIUM 125 MCG: 0.12 TABLET ORAL at 05:23

## 2022-05-25 RX ADMIN — AMLODIPINE BESYLATE 10 MG: 10 TABLET ORAL at 07:47

## 2022-05-25 RX ADMIN — ASPIRIN 81 MG: 81 TABLET, COATED ORAL at 07:47

## 2022-05-25 RX ADMIN — Medication 10 ML: at 21:12

## 2022-05-25 RX ADMIN — ACETAMINOPHEN 325MG 650 MG: 325 TABLET ORAL at 05:24

## 2022-05-25 RX ADMIN — HYDRALAZINE HYDROCHLORIDE 50 MG: 50 TABLET, FILM COATED ORAL at 15:00

## 2022-05-25 RX ADMIN — HYDRALAZINE HYDROCHLORIDE 50 MG: 50 TABLET, FILM COATED ORAL at 21:10

## 2022-05-25 RX ADMIN — ROPINIROLE HYDROCHLORIDE 0.75 MG: 0.5 TABLET, FILM COATED ORAL at 21:10

## 2022-05-25 RX ADMIN — ACETAMINOPHEN 325MG 650 MG: 325 TABLET ORAL at 21:10

## 2022-05-25 RX ADMIN — Medication 3 MG: at 21:10

## 2022-05-25 RX ADMIN — SERTRALINE 100 MG: 100 TABLET, FILM COATED ORAL at 07:47

## 2022-05-25 RX ADMIN — INSULIN LISPRO 4 UNITS: 100 INJECTION, SOLUTION INTRAVENOUS; SUBCUTANEOUS at 19:15

## 2022-05-25 RX ADMIN — HYDRALAZINE HYDROCHLORIDE 50 MG: 50 TABLET, FILM COATED ORAL at 05:23

## 2022-05-25 RX ADMIN — INSULIN GLARGINE-YFGN 8 UNITS: 100 INJECTION, SOLUTION SUBCUTANEOUS at 07:51

## 2022-05-25 RX ADMIN — LIDOCAINE 1 PATCH: 50 PATCH CUTANEOUS at 07:47

## 2022-05-25 RX ADMIN — Medication 10 ML: at 07:47

## 2022-05-25 RX ADMIN — INSULIN LISPRO 3 UNITS: 100 INJECTION, SOLUTION INTRAVENOUS; SUBCUTANEOUS at 07:47

## 2022-05-25 RX ADMIN — TAMSULOSIN HYDROCHLORIDE 0.4 MG: 0.4 CAPSULE ORAL at 07:47

## 2022-05-25 RX ADMIN — INSULIN LISPRO 5 UNITS: 100 INJECTION, SOLUTION INTRAVENOUS; SUBCUTANEOUS at 00:19

## 2022-05-25 RX ADMIN — GABAPENTIN 200 MG: 100 CAPSULE ORAL at 21:10

## 2022-05-25 RX ADMIN — CARVEDILOL 25 MG: 25 TABLET, FILM COATED ORAL at 07:47

## 2022-05-25 RX ADMIN — CARVEDILOL 25 MG: 25 TABLET, FILM COATED ORAL at 16:50

## 2022-05-25 RX ADMIN — OXYCODONE 2.5 MG: 5 TABLET ORAL at 16:50

## 2022-05-25 RX ADMIN — PANTOPRAZOLE SODIUM 40 MG: 40 TABLET, DELAYED RELEASE ORAL at 05:24

## 2022-05-26 LAB
ALBUMIN SERPL-MCNC: 3.3 G/DL (ref 3.5–5.2)
ANION GAP SERPL CALCULATED.3IONS-SCNC: 13 MMOL/L (ref 5–15)
BASOPHILS # BLD AUTO: 0.04 10*3/MM3 (ref 0–0.2)
BASOPHILS NFR BLD AUTO: 0.5 % (ref 0–1.5)
BUN SERPL-MCNC: 27 MG/DL (ref 6–20)
BUN/CREAT SERPL: 8.7 (ref 7–25)
CALCIUM SPEC-SCNC: 8.1 MG/DL (ref 8.6–10.5)
CHLORIDE SERPL-SCNC: 96 MMOL/L (ref 98–107)
CO2 SERPL-SCNC: 22 MMOL/L (ref 22–29)
CREAT SERPL-MCNC: 3.1 MG/DL (ref 0.57–1)
CRP SERPL-MCNC: 3.84 MG/DL (ref 0–0.5)
DEPRECATED RDW RBC AUTO: 53.4 FL (ref 37–54)
EGFRCR SERPLBLD CKD-EPI 2021: 17 ML/MIN/1.73
EOSINOPHIL # BLD AUTO: 0.04 10*3/MM3 (ref 0–0.4)
EOSINOPHIL NFR BLD AUTO: 0.5 % (ref 0.3–6.2)
ERYTHROCYTE [DISTWIDTH] IN BLOOD BY AUTOMATED COUNT: 17.1 % (ref 12.3–15.4)
ERYTHROCYTE [SEDIMENTATION RATE] IN BLOOD: 44 MM/HR (ref 0–30)
GLUCOSE BLDC GLUCOMTR-MCNC: 164 MG/DL (ref 70–130)
GLUCOSE BLDC GLUCOMTR-MCNC: 326 MG/DL (ref 70–130)
GLUCOSE BLDC GLUCOMTR-MCNC: 82 MG/DL (ref 70–130)
GLUCOSE BLDC GLUCOMTR-MCNC: 94 MG/DL (ref 70–130)
GLUCOSE SERPL-MCNC: 277 MG/DL (ref 65–99)
HCT VFR BLD AUTO: 24.5 % (ref 34–46.6)
HGB BLD-MCNC: 8 G/DL (ref 12–15.9)
IMM GRANULOCYTES # BLD AUTO: 0.02 10*3/MM3 (ref 0–0.05)
IMM GRANULOCYTES NFR BLD AUTO: 0.3 % (ref 0–0.5)
LYMPHOCYTES # BLD AUTO: 0.7 10*3/MM3 (ref 0.7–3.1)
LYMPHOCYTES NFR BLD AUTO: 8.9 % (ref 19.6–45.3)
MCH RBC QN AUTO: 27.6 PG (ref 26.6–33)
MCHC RBC AUTO-ENTMCNC: 32.7 G/DL (ref 31.5–35.7)
MCV RBC AUTO: 84.5 FL (ref 79–97)
MONOCYTES # BLD AUTO: 0.83 10*3/MM3 (ref 0.1–0.9)
MONOCYTES NFR BLD AUTO: 10.5 % (ref 5–12)
NEUTROPHILS NFR BLD AUTO: 6.24 10*3/MM3 (ref 1.7–7)
NEUTROPHILS NFR BLD AUTO: 79.3 % (ref 42.7–76)
NRBC BLD AUTO-RTO: 0 /100 WBC (ref 0–0.2)
PHOSPHATE SERPL-MCNC: 4.5 MG/DL (ref 2.5–4.5)
PLATELET # BLD AUTO: 263 10*3/MM3 (ref 140–450)
PMV BLD AUTO: 10.7 FL (ref 6–12)
POTASSIUM SERPL-SCNC: 4.5 MMOL/L (ref 3.5–5.2)
PROCALCITONIN SERPL-MCNC: 0.23 NG/ML (ref 0–0.25)
RBC # BLD AUTO: 2.9 10*6/MM3 (ref 3.77–5.28)
SODIUM SERPL-SCNC: 131 MMOL/L (ref 136–145)
VANCOMYCIN SERPL-MCNC: 12.3 MCG/ML (ref 5–40)
WBC NRBC COR # BLD: 7.87 10*3/MM3 (ref 3.4–10.8)

## 2022-05-26 PROCEDURE — 97129 THER IVNTJ 1ST 15 MIN: CPT

## 2022-05-26 PROCEDURE — 25010000002 HEPARIN (PORCINE) PER 1000 UNITS: Performed by: STUDENT IN AN ORGANIZED HEALTH CARE EDUCATION/TRAINING PROGRAM

## 2022-05-26 PROCEDURE — 25010000002 EPOETIN ALFA-EPBX 10000 UNIT/ML SOLUTION: Performed by: INTERNAL MEDICINE

## 2022-05-26 PROCEDURE — 80069 RENAL FUNCTION PANEL: CPT | Performed by: PHYSICAL MEDICINE & REHABILITATION

## 2022-05-26 PROCEDURE — 85652 RBC SED RATE AUTOMATED: CPT | Performed by: PHYSICAL MEDICINE & REHABILITATION

## 2022-05-26 PROCEDURE — 84145 PROCALCITONIN (PCT): CPT | Performed by: PHYSICAL MEDICINE & REHABILITATION

## 2022-05-26 PROCEDURE — 99233 SBSQ HOSP IP/OBS HIGH 50: CPT | Performed by: STUDENT IN AN ORGANIZED HEALTH CARE EDUCATION/TRAINING PROGRAM

## 2022-05-26 PROCEDURE — 97110 THERAPEUTIC EXERCISES: CPT

## 2022-05-26 PROCEDURE — 80202 ASSAY OF VANCOMYCIN: CPT | Performed by: PHYSICAL MEDICINE & REHABILITATION

## 2022-05-26 PROCEDURE — 97130 THER IVNTJ EA ADDL 15 MIN: CPT

## 2022-05-26 PROCEDURE — 97530 THERAPEUTIC ACTIVITIES: CPT

## 2022-05-26 PROCEDURE — 63710000001 INSULIN LISPRO (HUMAN) PER 5 UNITS: Performed by: PHYSICAL MEDICINE & REHABILITATION

## 2022-05-26 PROCEDURE — 82962 GLUCOSE BLOOD TEST: CPT

## 2022-05-26 PROCEDURE — 25010000002 VANCOMYCIN PER 500 MG: Performed by: PHYSICAL MEDICINE & REHABILITATION

## 2022-05-26 PROCEDURE — 86140 C-REACTIVE PROTEIN: CPT | Performed by: PHYSICAL MEDICINE & REHABILITATION

## 2022-05-26 PROCEDURE — 85025 COMPLETE CBC W/AUTO DIFF WBC: CPT | Performed by: PHYSICAL MEDICINE & REHABILITATION

## 2022-05-26 PROCEDURE — 97535 SELF CARE MNGMENT TRAINING: CPT

## 2022-05-26 RX ORDER — OXYCODONE HYDROCHLORIDE 5 MG/1
2.5 TABLET ORAL ONCE
Status: COMPLETED | OUTPATIENT
Start: 2022-05-26 | End: 2022-05-26

## 2022-05-26 RX ORDER — OXYCODONE HYDROCHLORIDE 5 MG/1
2.5 TABLET ORAL EVERY 4 HOURS PRN
Status: DISCONTINUED | OUTPATIENT
Start: 2022-05-26 | End: 2022-06-04

## 2022-05-26 RX ADMIN — HYDRALAZINE HYDROCHLORIDE 50 MG: 50 TABLET, FILM COATED ORAL at 05:40

## 2022-05-26 RX ADMIN — TAMSULOSIN HYDROCHLORIDE 0.4 MG: 0.4 CAPSULE ORAL at 08:01

## 2022-05-26 RX ADMIN — CARVEDILOL 25 MG: 25 TABLET, FILM COATED ORAL at 17:26

## 2022-05-26 RX ADMIN — VANCOMYCIN HYDROCHLORIDE 500 MG: 500 INJECTION, POWDER, LYOPHILIZED, FOR SOLUTION INTRAVENOUS at 17:26

## 2022-05-26 RX ADMIN — OXYCODONE 2.5 MG: 5 TABLET ORAL at 00:12

## 2022-05-26 RX ADMIN — HEPARIN SODIUM 3800 UNITS: 1000 INJECTION INTRAVENOUS; SUBCUTANEOUS at 17:06

## 2022-05-26 RX ADMIN — INSULIN LISPRO 5 UNITS: 100 INJECTION, SOLUTION INTRAVENOUS; SUBCUTANEOUS at 11:55

## 2022-05-26 RX ADMIN — LEVOTHYROXINE SODIUM 125 MCG: 0.12 TABLET ORAL at 05:40

## 2022-05-26 RX ADMIN — EPOETIN ALFA-EPBX 10000 UNITS: 10000 INJECTION, SOLUTION INTRAVENOUS; SUBCUTANEOUS at 15:54

## 2022-05-26 RX ADMIN — LIDOCAINE 1 PATCH: 50 PATCH CUTANEOUS at 08:01

## 2022-05-26 RX ADMIN — OXYCODONE 2.5 MG: 5 TABLET ORAL at 19:00

## 2022-05-26 RX ADMIN — ACETAMINOPHEN 325MG 650 MG: 325 TABLET ORAL at 11:55

## 2022-05-26 RX ADMIN — ROPINIROLE HYDROCHLORIDE 0.75 MG: 0.5 TABLET, FILM COATED ORAL at 21:21

## 2022-05-26 RX ADMIN — OXYCODONE 2.5 MG: 5 TABLET ORAL at 12:45

## 2022-05-26 RX ADMIN — Medication 10 ML: at 08:05

## 2022-05-26 RX ADMIN — PANTOPRAZOLE SODIUM 40 MG: 40 TABLET, DELAYED RELEASE ORAL at 05:40

## 2022-05-26 RX ADMIN — INSULIN GLARGINE-YFGN 8 UNITS: 100 INJECTION, SOLUTION SUBCUTANEOUS at 08:02

## 2022-05-26 RX ADMIN — HYDRALAZINE HYDROCHLORIDE 50 MG: 50 TABLET, FILM COATED ORAL at 21:21

## 2022-05-26 RX ADMIN — GABAPENTIN 200 MG: 100 CAPSULE ORAL at 21:22

## 2022-05-26 RX ADMIN — Medication 10 ML: at 23:07

## 2022-05-26 RX ADMIN — ACETAMINOPHEN 325MG 650 MG: 325 TABLET ORAL at 08:01

## 2022-05-26 RX ADMIN — ASPIRIN 81 MG: 81 TABLET, COATED ORAL at 08:01

## 2022-05-26 RX ADMIN — Medication 3 MG: at 21:21

## 2022-05-26 RX ADMIN — SERTRALINE 100 MG: 100 TABLET, FILM COATED ORAL at 08:01

## 2022-05-26 RX ADMIN — OXYCODONE 2.5 MG: 5 TABLET ORAL at 23:07

## 2022-05-27 LAB
ALBUMIN SERPL-MCNC: 3.3 G/DL (ref 3.5–5.2)
ANION GAP SERPL CALCULATED.3IONS-SCNC: 12 MMOL/L (ref 5–15)
BASOPHILS # BLD AUTO: 0.03 10*3/MM3 (ref 0–0.2)
BASOPHILS NFR BLD AUTO: 0.3 % (ref 0–1.5)
BUN SERPL-MCNC: 16 MG/DL (ref 6–20)
BUN/CREAT SERPL: 6.9 (ref 7–25)
CALCIUM SPEC-SCNC: 7.9 MG/DL (ref 8.6–10.5)
CHLORIDE SERPL-SCNC: 96 MMOL/L (ref 98–107)
CK SERPL-CCNC: 94 U/L (ref 20–180)
CO2 SERPL-SCNC: 24 MMOL/L (ref 22–29)
CREAT SERPL-MCNC: 2.31 MG/DL (ref 0.57–1)
DEPRECATED RDW RBC AUTO: 54.1 FL (ref 37–54)
EGFRCR SERPLBLD CKD-EPI 2021: 24.3 ML/MIN/1.73
EOSINOPHIL # BLD AUTO: 0.02 10*3/MM3 (ref 0–0.4)
EOSINOPHIL NFR BLD AUTO: 0.2 % (ref 0.3–6.2)
ERYTHROCYTE [DISTWIDTH] IN BLOOD BY AUTOMATED COUNT: 17.4 % (ref 12.3–15.4)
GLUCOSE BLDC GLUCOMTR-MCNC: 131 MG/DL (ref 70–130)
GLUCOSE BLDC GLUCOMTR-MCNC: 178 MG/DL (ref 70–130)
GLUCOSE BLDC GLUCOMTR-MCNC: 191 MG/DL (ref 70–130)
GLUCOSE BLDC GLUCOMTR-MCNC: 204 MG/DL (ref 70–130)
GLUCOSE SERPL-MCNC: 135 MG/DL (ref 65–99)
HCT VFR BLD AUTO: 24.7 % (ref 34–46.6)
HGB BLD-MCNC: 8.1 G/DL (ref 12–15.9)
IMM GRANULOCYTES # BLD AUTO: 0.04 10*3/MM3 (ref 0–0.05)
IMM GRANULOCYTES NFR BLD AUTO: 0.5 % (ref 0–0.5)
LYMPHOCYTES # BLD AUTO: 0.92 10*3/MM3 (ref 0.7–3.1)
LYMPHOCYTES NFR BLD AUTO: 10.6 % (ref 19.6–45.3)
MCH RBC QN AUTO: 27.6 PG (ref 26.6–33)
MCHC RBC AUTO-ENTMCNC: 32.8 G/DL (ref 31.5–35.7)
MCV RBC AUTO: 84.3 FL (ref 79–97)
MONOCYTES # BLD AUTO: 1 10*3/MM3 (ref 0.1–0.9)
MONOCYTES NFR BLD AUTO: 11.5 % (ref 5–12)
NEUTROPHILS NFR BLD AUTO: 6.68 10*3/MM3 (ref 1.7–7)
NEUTROPHILS NFR BLD AUTO: 76.9 % (ref 42.7–76)
NRBC BLD AUTO-RTO: 0 /100 WBC (ref 0–0.2)
PHOSPHATE SERPL-MCNC: 2.8 MG/DL (ref 2.5–4.5)
PLATELET # BLD AUTO: 318 10*3/MM3 (ref 140–450)
PMV BLD AUTO: 10.3 FL (ref 6–12)
POTASSIUM SERPL-SCNC: 3.6 MMOL/L (ref 3.5–5.2)
RBC # BLD AUTO: 2.93 10*6/MM3 (ref 3.77–5.28)
SODIUM SERPL-SCNC: 132 MMOL/L (ref 136–145)
WBC NRBC COR # BLD: 8.69 10*3/MM3 (ref 3.4–10.8)

## 2022-05-27 PROCEDURE — 97110 THERAPEUTIC EXERCISES: CPT | Performed by: OCCUPATIONAL THERAPIST

## 2022-05-27 PROCEDURE — 97530 THERAPEUTIC ACTIVITIES: CPT

## 2022-05-27 PROCEDURE — 63710000001 INSULIN LISPRO (HUMAN) PER 5 UNITS: Performed by: PHYSICAL MEDICINE & REHABILITATION

## 2022-05-27 PROCEDURE — 85025 COMPLETE CBC W/AUTO DIFF WBC: CPT | Performed by: PHYSICAL MEDICINE & REHABILITATION

## 2022-05-27 PROCEDURE — 97129 THER IVNTJ 1ST 15 MIN: CPT

## 2022-05-27 PROCEDURE — 97535 SELF CARE MNGMENT TRAINING: CPT | Performed by: OCCUPATIONAL THERAPIST

## 2022-05-27 PROCEDURE — 97110 THERAPEUTIC EXERCISES: CPT

## 2022-05-27 PROCEDURE — 82550 ASSAY OF CK (CPK): CPT | Performed by: PHYSICAL MEDICINE & REHABILITATION

## 2022-05-27 PROCEDURE — 97130 THER IVNTJ EA ADDL 15 MIN: CPT

## 2022-05-27 PROCEDURE — 82962 GLUCOSE BLOOD TEST: CPT

## 2022-05-27 PROCEDURE — 80069 RENAL FUNCTION PANEL: CPT | Performed by: PHYSICAL MEDICINE & REHABILITATION

## 2022-05-27 PROCEDURE — 82085 ASSAY OF ALDOLASE: CPT | Performed by: PHYSICAL MEDICINE & REHABILITATION

## 2022-05-27 RX ADMIN — CARVEDILOL 25 MG: 25 TABLET, FILM COATED ORAL at 17:52

## 2022-05-27 RX ADMIN — OXYCODONE 2.5 MG: 5 TABLET ORAL at 09:24

## 2022-05-27 RX ADMIN — OXYCODONE 2.5 MG: 5 TABLET ORAL at 22:51

## 2022-05-27 RX ADMIN — OXYCODONE 2.5 MG: 5 TABLET ORAL at 17:40

## 2022-05-27 RX ADMIN — HYDRALAZINE HYDROCHLORIDE 50 MG: 50 TABLET, FILM COATED ORAL at 22:58

## 2022-05-27 RX ADMIN — GABAPENTIN 200 MG: 100 CAPSULE ORAL at 23:02

## 2022-05-27 RX ADMIN — AMLODIPINE BESYLATE 10 MG: 10 TABLET ORAL at 08:19

## 2022-05-27 RX ADMIN — HYDRALAZINE HYDROCHLORIDE 50 MG: 50 TABLET, FILM COATED ORAL at 05:03

## 2022-05-27 RX ADMIN — Medication 10 ML: at 23:02

## 2022-05-27 RX ADMIN — ASPIRIN 81 MG: 81 TABLET, COATED ORAL at 08:24

## 2022-05-27 RX ADMIN — INSULIN LISPRO 3 UNITS: 100 INJECTION, SOLUTION INTRAVENOUS; SUBCUTANEOUS at 17:52

## 2022-05-27 RX ADMIN — ROPINIROLE HYDROCHLORIDE 0.75 MG: 0.5 TABLET, FILM COATED ORAL at 22:52

## 2022-05-27 RX ADMIN — HYDRALAZINE HYDROCHLORIDE 50 MG: 50 TABLET, FILM COATED ORAL at 13:53

## 2022-05-27 RX ADMIN — INSULIN GLARGINE-YFGN 12 UNITS: 100 INJECTION, SOLUTION SUBCUTANEOUS at 07:46

## 2022-05-27 RX ADMIN — INSULIN LISPRO 2 UNITS: 100 INJECTION, SOLUTION INTRAVENOUS; SUBCUTANEOUS at 07:48

## 2022-05-27 RX ADMIN — OXYCODONE 2.5 MG: 5 TABLET ORAL at 05:03

## 2022-05-27 RX ADMIN — LIDOCAINE 1 PATCH: 50 PATCH CUTANEOUS at 08:19

## 2022-05-27 RX ADMIN — Medication 10 ML: at 08:19

## 2022-05-27 RX ADMIN — PANTOPRAZOLE SODIUM 40 MG: 40 TABLET, DELAYED RELEASE ORAL at 05:03

## 2022-05-27 RX ADMIN — LEVOTHYROXINE SODIUM 125 MCG: 0.12 TABLET ORAL at 05:03

## 2022-05-27 RX ADMIN — CARVEDILOL 25 MG: 25 TABLET, FILM COATED ORAL at 08:18

## 2022-05-27 RX ADMIN — TAMSULOSIN HYDROCHLORIDE 0.4 MG: 0.4 CAPSULE ORAL at 08:18

## 2022-05-27 RX ADMIN — SERTRALINE 100 MG: 100 TABLET, FILM COATED ORAL at 08:18

## 2022-05-27 RX ADMIN — OXYCODONE 2.5 MG: 5 TABLET ORAL at 13:42

## 2022-05-28 LAB
ALBUMIN SERPL-MCNC: 3 G/DL (ref 3.5–5.2)
ALP SERPL-CCNC: 289 U/L (ref 39–117)
ALT SERPL W P-5'-P-CCNC: 43 U/L (ref 1–33)
ANION GAP SERPL CALCULATED.3IONS-SCNC: 13.9 MMOL/L (ref 5–15)
AST SERPL-CCNC: 54 U/L (ref 1–32)
BASOPHILS # BLD AUTO: 0.03 10*3/MM3 (ref 0–0.2)
BASOPHILS NFR BLD AUTO: 0.5 % (ref 0–1.5)
BILIRUB CONJ SERPL-MCNC: 0.2 MG/DL (ref 0–0.3)
BILIRUB INDIRECT SERPL-MCNC: 0.1 MG/DL
BILIRUB SERPL-MCNC: 0.3 MG/DL (ref 0–1.2)
BUN SERPL-MCNC: 26 MG/DL (ref 6–20)
BUN/CREAT SERPL: 8.6 (ref 7–25)
CALCIUM SPEC-SCNC: 8 MG/DL (ref 8.6–10.5)
CHLORIDE SERPL-SCNC: 96 MMOL/L (ref 98–107)
CO2 SERPL-SCNC: 24.1 MMOL/L (ref 22–29)
CREAT SERPL-MCNC: 3.01 MG/DL (ref 0.57–1)
DEPRECATED RDW RBC AUTO: 50 FL (ref 37–54)
EGFRCR SERPLBLD CKD-EPI 2021: 17.7 ML/MIN/1.73
EOSINOPHIL # BLD AUTO: 0.06 10*3/MM3 (ref 0–0.4)
EOSINOPHIL NFR BLD AUTO: 0.9 % (ref 0.3–6.2)
ERYTHROCYTE [DISTWIDTH] IN BLOOD BY AUTOMATED COUNT: 17.3 % (ref 12.3–15.4)
GLUCOSE BLDC GLUCOMTR-MCNC: 101 MG/DL (ref 70–130)
GLUCOSE BLDC GLUCOMTR-MCNC: 102 MG/DL (ref 70–130)
GLUCOSE BLDC GLUCOMTR-MCNC: 123 MG/DL (ref 70–130)
GLUCOSE BLDC GLUCOMTR-MCNC: 247 MG/DL (ref 70–130)
GLUCOSE SERPL-MCNC: 100 MG/DL (ref 65–99)
HCT VFR BLD AUTO: 22.5 % (ref 34–46.6)
HGB BLD-MCNC: 7.5 G/DL (ref 12–15.9)
IMM GRANULOCYTES # BLD AUTO: 0.03 10*3/MM3 (ref 0–0.05)
IMM GRANULOCYTES NFR BLD AUTO: 0.5 % (ref 0–0.5)
LYMPHOCYTES # BLD AUTO: 0.96 10*3/MM3 (ref 0.7–3.1)
LYMPHOCYTES NFR BLD AUTO: 14.6 % (ref 19.6–45.3)
MAGNESIUM SERPL-MCNC: 1.6 MG/DL (ref 1.6–2.6)
MCH RBC QN AUTO: 27.3 PG (ref 26.6–33)
MCHC RBC AUTO-ENTMCNC: 33.3 G/DL (ref 31.5–35.7)
MCV RBC AUTO: 81.8 FL (ref 79–97)
MONOCYTES # BLD AUTO: 0.81 10*3/MM3 (ref 0.1–0.9)
MONOCYTES NFR BLD AUTO: 12.3 % (ref 5–12)
NEUTROPHILS NFR BLD AUTO: 4.67 10*3/MM3 (ref 1.7–7)
NEUTROPHILS NFR BLD AUTO: 71.2 % (ref 42.7–76)
NRBC BLD AUTO-RTO: 0 /100 WBC (ref 0–0.2)
PHOSPHATE SERPL-MCNC: 3.9 MG/DL (ref 2.5–4.5)
PLATELET # BLD AUTO: 254 10*3/MM3 (ref 140–450)
PMV BLD AUTO: 10 FL (ref 6–12)
POTASSIUM SERPL-SCNC: 3.8 MMOL/L (ref 3.5–5.2)
PROT SERPL-MCNC: 5.8 G/DL (ref 6–8.5)
RBC # BLD AUTO: 2.75 10*6/MM3 (ref 3.77–5.28)
SODIUM SERPL-SCNC: 134 MMOL/L (ref 136–145)
WBC NRBC COR # BLD: 6.56 10*3/MM3 (ref 3.4–10.8)

## 2022-05-28 PROCEDURE — 83735 ASSAY OF MAGNESIUM: CPT | Performed by: INTERNAL MEDICINE

## 2022-05-28 PROCEDURE — 97116 GAIT TRAINING THERAPY: CPT | Performed by: PHYSICAL THERAPIST

## 2022-05-28 PROCEDURE — 85025 COMPLETE CBC W/AUTO DIFF WBC: CPT | Performed by: PHYSICAL MEDICINE & REHABILITATION

## 2022-05-28 PROCEDURE — 82962 GLUCOSE BLOOD TEST: CPT

## 2022-05-28 PROCEDURE — 80048 BASIC METABOLIC PNL TOTAL CA: CPT | Performed by: PHYSICAL MEDICINE & REHABILITATION

## 2022-05-28 PROCEDURE — 97130 THER IVNTJ EA ADDL 15 MIN: CPT

## 2022-05-28 PROCEDURE — 97110 THERAPEUTIC EXERCISES: CPT | Performed by: PHYSICAL THERAPIST

## 2022-05-28 PROCEDURE — 97535 SELF CARE MNGMENT TRAINING: CPT

## 2022-05-28 PROCEDURE — 97110 THERAPEUTIC EXERCISES: CPT

## 2022-05-28 PROCEDURE — 63710000001 INSULIN LISPRO (HUMAN) PER 5 UNITS: Performed by: PHYSICAL MEDICINE & REHABILITATION

## 2022-05-28 PROCEDURE — 80076 HEPATIC FUNCTION PANEL: CPT | Performed by: PHYSICAL MEDICINE & REHABILITATION

## 2022-05-28 PROCEDURE — 84100 ASSAY OF PHOSPHORUS: CPT | Performed by: PHYSICAL MEDICINE & REHABILITATION

## 2022-05-28 PROCEDURE — 97129 THER IVNTJ 1ST 15 MIN: CPT

## 2022-05-28 PROCEDURE — 97530 THERAPEUTIC ACTIVITIES: CPT | Performed by: PHYSICAL THERAPIST

## 2022-05-28 PROCEDURE — 25010000002 EPOETIN ALFA-EPBX 10000 UNIT/ML SOLUTION: Performed by: INTERNAL MEDICINE

## 2022-05-28 RX ORDER — PREDNISONE 10 MG/1
5 TABLET ORAL
Status: DISCONTINUED | OUTPATIENT
Start: 2022-06-01 | End: 2022-06-02

## 2022-05-28 RX ORDER — PREDNISONE 10 MG/1
10 TABLET ORAL
Status: COMPLETED | OUTPATIENT
Start: 2022-05-29 | End: 2022-05-31

## 2022-05-28 RX ORDER — FOLIC ACID/VIT B COMPLEX AND C 0.8 MG
1 TABLET ORAL DAILY
Status: DISCONTINUED | OUTPATIENT
Start: 2022-05-28 | End: 2022-07-22 | Stop reason: HOSPADM

## 2022-05-28 RX ORDER — ALBUMIN (HUMAN) 12.5 G/50ML
12.5 SOLUTION INTRAVENOUS AS NEEDED
Status: ACTIVE | OUTPATIENT
Start: 2022-05-28 | End: 2022-05-28

## 2022-05-28 RX ADMIN — CARVEDILOL 25 MG: 25 TABLET, FILM COATED ORAL at 18:11

## 2022-05-28 RX ADMIN — TAMSULOSIN HYDROCHLORIDE 0.4 MG: 0.4 CAPSULE ORAL at 08:13

## 2022-05-28 RX ADMIN — GABAPENTIN 200 MG: 100 CAPSULE ORAL at 20:58

## 2022-05-28 RX ADMIN — HYDRALAZINE HYDROCHLORIDE 50 MG: 50 TABLET, FILM COATED ORAL at 20:58

## 2022-05-28 RX ADMIN — INSULIN LISPRO 3 UNITS: 100 INJECTION, SOLUTION INTRAVENOUS; SUBCUTANEOUS at 11:23

## 2022-05-28 RX ADMIN — OXYCODONE 2.5 MG: 5 TABLET ORAL at 23:16

## 2022-05-28 RX ADMIN — INSULIN GLARGINE-YFGN 12 UNITS: 100 INJECTION, SOLUTION SUBCUTANEOUS at 08:14

## 2022-05-28 RX ADMIN — LEVOTHYROXINE SODIUM 125 MCG: 0.12 TABLET ORAL at 05:56

## 2022-05-28 RX ADMIN — PANTOPRAZOLE SODIUM 40 MG: 40 TABLET, DELAYED RELEASE ORAL at 05:56

## 2022-05-28 RX ADMIN — EPOETIN ALFA-EPBX 10000 UNITS: 10000 INJECTION, SOLUTION INTRAVENOUS; SUBCUTANEOUS at 14:45

## 2022-05-28 RX ADMIN — OXYCODONE 2.5 MG: 5 TABLET ORAL at 19:20

## 2022-05-28 RX ADMIN — OXYCODONE 2.5 MG: 5 TABLET ORAL at 14:32

## 2022-05-28 RX ADMIN — SERTRALINE 100 MG: 100 TABLET, FILM COATED ORAL at 08:13

## 2022-05-28 RX ADMIN — ACETAMINOPHEN 325MG 650 MG: 325 TABLET ORAL at 22:41

## 2022-05-28 RX ADMIN — LIDOCAINE 1 PATCH: 50 PATCH CUTANEOUS at 08:13

## 2022-05-28 RX ADMIN — ROPINIROLE HYDROCHLORIDE 0.75 MG: 0.5 TABLET, FILM COATED ORAL at 20:58

## 2022-05-28 RX ADMIN — ACETAMINOPHEN 325MG 650 MG: 325 TABLET ORAL at 18:11

## 2022-05-28 RX ADMIN — ASPIRIN 81 MG: 81 TABLET, COATED ORAL at 08:13

## 2022-05-28 RX ADMIN — OXYCODONE 2.5 MG: 5 TABLET ORAL at 10:10

## 2022-05-28 RX ADMIN — OXYCODONE 2.5 MG: 5 TABLET ORAL at 05:56

## 2022-05-28 RX ADMIN — HYDRALAZINE HYDROCHLORIDE 50 MG: 50 TABLET, FILM COATED ORAL at 05:56

## 2022-05-28 RX ADMIN — Medication 3 MG: at 00:00

## 2022-05-28 RX ADMIN — Medication 1 TABLET: at 11:00

## 2022-05-29 LAB
ALBUMIN SERPL-MCNC: 3 G/DL (ref 3.5–5.2)
ANION GAP SERPL CALCULATED.3IONS-SCNC: 9.1 MMOL/L (ref 5–15)
BASOPHILS # BLD AUTO: 0.04 10*3/MM3 (ref 0–0.2)
BASOPHILS NFR BLD AUTO: 0.3 % (ref 0–1.5)
BUN SERPL-MCNC: 17 MG/DL (ref 6–20)
BUN/CREAT SERPL: 7.7 (ref 7–25)
CALCIUM SPEC-SCNC: 8.8 MG/DL (ref 8.6–10.5)
CHLORIDE SERPL-SCNC: 95 MMOL/L (ref 98–107)
CO2 SERPL-SCNC: 25.9 MMOL/L (ref 22–29)
CREAT SERPL-MCNC: 2.21 MG/DL (ref 0.57–1)
DEPRECATED RDW RBC AUTO: 56.9 FL (ref 37–54)
EGFRCR SERPLBLD CKD-EPI 2021: 25.6 ML/MIN/1.73
EOSINOPHIL # BLD AUTO: 0.02 10*3/MM3 (ref 0–0.4)
EOSINOPHIL NFR BLD AUTO: 0.2 % (ref 0.3–6.2)
ERYTHROCYTE [DISTWIDTH] IN BLOOD BY AUTOMATED COUNT: 17.5 % (ref 12.3–15.4)
GLUCOSE BLDC GLUCOMTR-MCNC: 220 MG/DL (ref 70–130)
GLUCOSE BLDC GLUCOMTR-MCNC: 312 MG/DL (ref 70–130)
GLUCOSE BLDC GLUCOMTR-MCNC: 367 MG/DL (ref 70–130)
GLUCOSE BLDC GLUCOMTR-MCNC: 89 MG/DL (ref 70–130)
GLUCOSE SERPL-MCNC: 157 MG/DL (ref 65–99)
HCT VFR BLD AUTO: 26.7 % (ref 34–46.6)
HGB BLD-MCNC: 8.3 G/DL (ref 12–15.9)
IMM GRANULOCYTES # BLD AUTO: 0.05 10*3/MM3 (ref 0–0.05)
IMM GRANULOCYTES NFR BLD AUTO: 0.4 % (ref 0–0.5)
LYMPHOCYTES # BLD AUTO: 0.6 10*3/MM3 (ref 0.7–3.1)
LYMPHOCYTES NFR BLD AUTO: 4.9 % (ref 19.6–45.3)
MCH RBC QN AUTO: 27 PG (ref 26.6–33)
MCHC RBC AUTO-ENTMCNC: 31.1 G/DL (ref 31.5–35.7)
MCV RBC AUTO: 87 FL (ref 79–97)
MONOCYTES # BLD AUTO: 1.36 10*3/MM3 (ref 0.1–0.9)
MONOCYTES NFR BLD AUTO: 11.1 % (ref 5–12)
NEUTROPHILS NFR BLD AUTO: 10.13 10*3/MM3 (ref 1.7–7)
NEUTROPHILS NFR BLD AUTO: 83.1 % (ref 42.7–76)
NRBC BLD AUTO-RTO: 0 /100 WBC (ref 0–0.2)
PHOSPHATE SERPL-MCNC: 3 MG/DL (ref 2.5–4.5)
PLATELET # BLD AUTO: 306 10*3/MM3 (ref 140–450)
PMV BLD AUTO: 10.6 FL (ref 6–12)
POTASSIUM SERPL-SCNC: 4.2 MMOL/L (ref 3.5–5.2)
RBC # BLD AUTO: 3.07 10*6/MM3 (ref 3.77–5.28)
SODIUM SERPL-SCNC: 130 MMOL/L (ref 136–145)
WBC NRBC COR # BLD: 12.2 10*3/MM3 (ref 3.4–10.8)

## 2022-05-29 PROCEDURE — 63710000001 PREDNISONE PER 5 MG: Performed by: PHYSICAL MEDICINE & REHABILITATION

## 2022-05-29 PROCEDURE — 82962 GLUCOSE BLOOD TEST: CPT

## 2022-05-29 PROCEDURE — 63710000001 INSULIN LISPRO (HUMAN) PER 5 UNITS: Performed by: PHYSICAL MEDICINE & REHABILITATION

## 2022-05-29 PROCEDURE — 85025 COMPLETE CBC W/AUTO DIFF WBC: CPT | Performed by: PHYSICAL MEDICINE & REHABILITATION

## 2022-05-29 PROCEDURE — 80069 RENAL FUNCTION PANEL: CPT | Performed by: PHYSICAL MEDICINE & REHABILITATION

## 2022-05-29 RX ORDER — OXYCODONE HYDROCHLORIDE 5 MG/1
5 TABLET ORAL ONCE
Status: COMPLETED | OUTPATIENT
Start: 2022-05-29 | End: 2022-05-29

## 2022-05-29 RX ADMIN — INSULIN GLARGINE-YFGN 15 UNITS: 100 INJECTION, SOLUTION SUBCUTANEOUS at 08:03

## 2022-05-29 RX ADMIN — ASPIRIN 81 MG: 81 TABLET, COATED ORAL at 08:02

## 2022-05-29 RX ADMIN — OXYCODONE 2.5 MG: 5 TABLET ORAL at 21:20

## 2022-05-29 RX ADMIN — AMLODIPINE BESYLATE 10 MG: 10 TABLET ORAL at 08:02

## 2022-05-29 RX ADMIN — LIDOCAINE 1 PATCH: 50 PATCH CUTANEOUS at 08:02

## 2022-05-29 RX ADMIN — OXYCODONE 2.5 MG: 5 TABLET ORAL at 11:13

## 2022-05-29 RX ADMIN — ROPINIROLE HYDROCHLORIDE 0.75 MG: 0.5 TABLET, FILM COATED ORAL at 21:19

## 2022-05-29 RX ADMIN — INSULIN LISPRO 5 UNITS: 100 INJECTION, SOLUTION INTRAVENOUS; SUBCUTANEOUS at 16:54

## 2022-05-29 RX ADMIN — Medication 1 TABLET: at 08:02

## 2022-05-29 RX ADMIN — OXYCODONE 2.5 MG: 5 TABLET ORAL at 17:00

## 2022-05-29 RX ADMIN — ACETAMINOPHEN 325MG 650 MG: 325 TABLET ORAL at 07:01

## 2022-05-29 RX ADMIN — HYDRALAZINE HYDROCHLORIDE 75 MG: 50 TABLET, FILM COATED ORAL at 15:00

## 2022-05-29 RX ADMIN — ACETAMINOPHEN 325MG 650 MG: 325 TABLET ORAL at 21:19

## 2022-05-29 RX ADMIN — OXYCODONE 2.5 MG: 5 TABLET ORAL at 07:01

## 2022-05-29 RX ADMIN — SERTRALINE 100 MG: 100 TABLET, FILM COATED ORAL at 08:02

## 2022-05-29 RX ADMIN — INSULIN LISPRO 6 UNITS: 100 INJECTION, SOLUTION INTRAVENOUS; SUBCUTANEOUS at 11:32

## 2022-05-29 RX ADMIN — CARVEDILOL 25 MG: 25 TABLET, FILM COATED ORAL at 08:02

## 2022-05-29 RX ADMIN — HYDRALAZINE HYDROCHLORIDE 50 MG: 50 TABLET, FILM COATED ORAL at 05:54

## 2022-05-29 RX ADMIN — OXYCODONE 5 MG: 5 TABLET ORAL at 02:55

## 2022-05-29 RX ADMIN — Medication 3 MG: at 21:19

## 2022-05-29 RX ADMIN — GABAPENTIN 200 MG: 100 CAPSULE ORAL at 21:20

## 2022-05-29 RX ADMIN — ACETAMINOPHEN 325MG 650 MG: 325 TABLET ORAL at 17:00

## 2022-05-29 RX ADMIN — LEVOTHYROXINE SODIUM 125 MCG: 0.12 TABLET ORAL at 05:54

## 2022-05-29 RX ADMIN — CARVEDILOL 25 MG: 25 TABLET, FILM COATED ORAL at 16:56

## 2022-05-29 RX ADMIN — ACETAMINOPHEN 325MG 650 MG: 325 TABLET ORAL at 02:55

## 2022-05-29 RX ADMIN — TAMSULOSIN HYDROCHLORIDE 0.4 MG: 0.4 CAPSULE ORAL at 08:02

## 2022-05-29 RX ADMIN — PANTOPRAZOLE SODIUM 40 MG: 40 TABLET, DELAYED RELEASE ORAL at 05:54

## 2022-05-29 RX ADMIN — ACETAMINOPHEN 325MG 650 MG: 325 TABLET ORAL at 11:13

## 2022-05-29 RX ADMIN — HYDRALAZINE HYDROCHLORIDE 75 MG: 50 TABLET, FILM COATED ORAL at 21:19

## 2022-05-29 RX ADMIN — PREDNISONE 10 MG: 10 TABLET ORAL at 08:02

## 2022-05-30 LAB
ALBUMIN SERPL-MCNC: 2.8 G/DL (ref 3.5–5.2)
ANION GAP SERPL CALCULATED.3IONS-SCNC: 12.1 MMOL/L (ref 5–15)
BASOPHILS # BLD AUTO: 0.06 10*3/MM3 (ref 0–0.2)
BASOPHILS NFR BLD AUTO: 0.5 % (ref 0–1.5)
BUN SERPL-MCNC: 30 MG/DL (ref 6–20)
BUN/CREAT SERPL: 9.8 (ref 7–25)
CALCIUM SPEC-SCNC: 8.8 MG/DL (ref 8.6–10.5)
CHLORIDE SERPL-SCNC: 94 MMOL/L (ref 98–107)
CO2 SERPL-SCNC: 24.9 MMOL/L (ref 22–29)
CREAT SERPL-MCNC: 3.05 MG/DL (ref 0.57–1)
DEPRECATED RDW RBC AUTO: 51.1 FL (ref 37–54)
EGFRCR SERPLBLD CKD-EPI 2021: 17.4 ML/MIN/1.73
EOSINOPHIL # BLD AUTO: 0.05 10*3/MM3 (ref 0–0.4)
EOSINOPHIL NFR BLD AUTO: 0.4 % (ref 0.3–6.2)
ERYTHROCYTE [DISTWIDTH] IN BLOOD BY AUTOMATED COUNT: 16.8 % (ref 12.3–15.4)
GLUCOSE BLDC GLUCOMTR-MCNC: 101 MG/DL (ref 70–130)
GLUCOSE BLDC GLUCOMTR-MCNC: 134 MG/DL (ref 70–130)
GLUCOSE BLDC GLUCOMTR-MCNC: 267 MG/DL (ref 70–130)
GLUCOSE BLDC GLUCOMTR-MCNC: 96 MG/DL (ref 70–130)
GLUCOSE SERPL-MCNC: 100 MG/DL (ref 65–99)
HCT VFR BLD AUTO: 23.1 % (ref 34–46.6)
HGB BLD-MCNC: 7.6 G/DL (ref 12–15.9)
IMM GRANULOCYTES # BLD AUTO: 0.05 10*3/MM3 (ref 0–0.05)
IMM GRANULOCYTES NFR BLD AUTO: 0.4 % (ref 0–0.5)
LYMPHOCYTES # BLD AUTO: 1.18 10*3/MM3 (ref 0.7–3.1)
LYMPHOCYTES NFR BLD AUTO: 10.3 % (ref 19.6–45.3)
MCH RBC QN AUTO: 27.3 PG (ref 26.6–33)
MCHC RBC AUTO-ENTMCNC: 32.9 G/DL (ref 31.5–35.7)
MCV RBC AUTO: 83.1 FL (ref 79–97)
MONOCYTES # BLD AUTO: 1.13 10*3/MM3 (ref 0.1–0.9)
MONOCYTES NFR BLD AUTO: 9.8 % (ref 5–12)
NEUTROPHILS NFR BLD AUTO: 78.6 % (ref 42.7–76)
NEUTROPHILS NFR BLD AUTO: 9.01 10*3/MM3 (ref 1.7–7)
NRBC BLD AUTO-RTO: 0.1 /100 WBC (ref 0–0.2)
PHOSPHATE SERPL-MCNC: 3.7 MG/DL (ref 2.5–4.5)
PLATELET # BLD AUTO: 292 10*3/MM3 (ref 140–450)
PMV BLD AUTO: 10.4 FL (ref 6–12)
POTASSIUM SERPL-SCNC: 4.4 MMOL/L (ref 3.5–5.2)
RBC # BLD AUTO: 2.78 10*6/MM3 (ref 3.77–5.28)
SODIUM SERPL-SCNC: 131 MMOL/L (ref 136–145)
WBC NRBC COR # BLD: 11.48 10*3/MM3 (ref 3.4–10.8)

## 2022-05-30 PROCEDURE — 85025 COMPLETE CBC W/AUTO DIFF WBC: CPT | Performed by: PHYSICAL MEDICINE & REHABILITATION

## 2022-05-30 PROCEDURE — 63710000001 PREDNISONE PER 5 MG: Performed by: PHYSICAL MEDICINE & REHABILITATION

## 2022-05-30 PROCEDURE — 97116 GAIT TRAINING THERAPY: CPT

## 2022-05-30 PROCEDURE — 97129 THER IVNTJ 1ST 15 MIN: CPT

## 2022-05-30 PROCEDURE — 82962 GLUCOSE BLOOD TEST: CPT

## 2022-05-30 PROCEDURE — 97130 THER IVNTJ EA ADDL 15 MIN: CPT

## 2022-05-30 PROCEDURE — 97110 THERAPEUTIC EXERCISES: CPT

## 2022-05-30 PROCEDURE — 25010000002 HEPARIN (PORCINE) PER 1000 UNITS: Performed by: STUDENT IN AN ORGANIZED HEALTH CARE EDUCATION/TRAINING PROGRAM

## 2022-05-30 PROCEDURE — 80069 RENAL FUNCTION PANEL: CPT | Performed by: PHYSICAL MEDICINE & REHABILITATION

## 2022-05-30 PROCEDURE — 97535 SELF CARE MNGMENT TRAINING: CPT

## 2022-05-30 RX ADMIN — ASPIRIN 81 MG: 81 TABLET, COATED ORAL at 08:08

## 2022-05-30 RX ADMIN — LIDOCAINE 1 PATCH: 50 PATCH CUTANEOUS at 08:12

## 2022-05-30 RX ADMIN — HYDRALAZINE HYDROCHLORIDE 75 MG: 50 TABLET, FILM COATED ORAL at 05:36

## 2022-05-30 RX ADMIN — OXYCODONE 2.5 MG: 5 TABLET ORAL at 18:05

## 2022-05-30 RX ADMIN — CARVEDILOL 25 MG: 25 TABLET, FILM COATED ORAL at 18:05

## 2022-05-30 RX ADMIN — ACETAMINOPHEN 325MG 650 MG: 325 TABLET ORAL at 12:18

## 2022-05-30 RX ADMIN — ACETAMINOPHEN 325MG 650 MG: 325 TABLET ORAL at 18:05

## 2022-05-30 RX ADMIN — HYDRALAZINE HYDROCHLORIDE 75 MG: 50 TABLET, FILM COATED ORAL at 20:38

## 2022-05-30 RX ADMIN — HYDRALAZINE HYDROCHLORIDE 75 MG: 50 TABLET, FILM COATED ORAL at 08:09

## 2022-05-30 RX ADMIN — Medication 3 MG: at 22:02

## 2022-05-30 RX ADMIN — TAMSULOSIN HYDROCHLORIDE 0.4 MG: 0.4 CAPSULE ORAL at 08:09

## 2022-05-30 RX ADMIN — ACETAMINOPHEN 325MG 650 MG: 325 TABLET ORAL at 08:08

## 2022-05-30 RX ADMIN — Medication 1 TABLET: at 08:09

## 2022-05-30 RX ADMIN — OXYCODONE 2.5 MG: 5 TABLET ORAL at 03:56

## 2022-05-30 RX ADMIN — OXYCODONE 2.5 MG: 5 TABLET ORAL at 08:07

## 2022-05-30 RX ADMIN — LEVOTHYROXINE SODIUM 125 MCG: 0.12 TABLET ORAL at 05:36

## 2022-05-30 RX ADMIN — OXYCODONE 2.5 MG: 5 TABLET ORAL at 22:02

## 2022-05-30 RX ADMIN — PREDNISONE 10 MG: 10 TABLET ORAL at 08:08

## 2022-05-30 RX ADMIN — ROPINIROLE HYDROCHLORIDE 0.75 MG: 0.5 TABLET, FILM COATED ORAL at 20:36

## 2022-05-30 RX ADMIN — PANTOPRAZOLE SODIUM 40 MG: 40 TABLET, DELAYED RELEASE ORAL at 05:36

## 2022-05-30 RX ADMIN — AMLODIPINE BESYLATE 10 MG: 10 TABLET ORAL at 08:09

## 2022-05-30 RX ADMIN — GABAPENTIN 200 MG: 100 CAPSULE ORAL at 20:36

## 2022-05-30 RX ADMIN — SERTRALINE 100 MG: 100 TABLET, FILM COATED ORAL at 08:09

## 2022-05-30 RX ADMIN — CARVEDILOL 25 MG: 25 TABLET, FILM COATED ORAL at 08:15

## 2022-05-30 RX ADMIN — OXYCODONE 2.5 MG: 5 TABLET ORAL at 12:17

## 2022-05-30 RX ADMIN — HEPARIN SODIUM 3800 UNITS: 1000 INJECTION INTRAVENOUS; SUBCUTANEOUS at 16:37

## 2022-05-30 RX ADMIN — INSULIN GLARGINE-YFGN 15 UNITS: 100 INJECTION, SOLUTION SUBCUTANEOUS at 08:14

## 2022-05-30 RX ADMIN — ACETAMINOPHEN 325MG 650 MG: 325 TABLET ORAL at 22:02

## 2022-05-31 LAB
ALBUMIN SERPL-MCNC: 3.1 G/DL (ref 3.5–5.2)
ALDOLASE SERPL-CCNC: 5.6 U/L (ref 3.3–10.3)
ANION GAP SERPL CALCULATED.3IONS-SCNC: 10 MMOL/L (ref 5–15)
BASOPHILS # BLD AUTO: 0.04 10*3/MM3 (ref 0–0.2)
BASOPHILS NFR BLD AUTO: 0.4 % (ref 0–1.5)
BUN SERPL-MCNC: 23 MG/DL (ref 6–20)
BUN/CREAT SERPL: 10.1 (ref 7–25)
CALCIUM SPEC-SCNC: 8.1 MG/DL (ref 8.6–10.5)
CHLORIDE SERPL-SCNC: 97 MMOL/L (ref 98–107)
CO2 SERPL-SCNC: 25 MMOL/L (ref 22–29)
CREAT SERPL-MCNC: 2.28 MG/DL (ref 0.57–1)
DEPRECATED RDW RBC AUTO: 55.2 FL (ref 37–54)
EGFRCR SERPLBLD CKD-EPI 2021: 24.6 ML/MIN/1.73
EOSINOPHIL # BLD AUTO: 0.03 10*3/MM3 (ref 0–0.4)
EOSINOPHIL NFR BLD AUTO: 0.3 % (ref 0.3–6.2)
ERYTHROCYTE [DISTWIDTH] IN BLOOD BY AUTOMATED COUNT: 17.4 % (ref 12.3–15.4)
GLUCOSE BLDC GLUCOMTR-MCNC: 193 MG/DL (ref 70–130)
GLUCOSE BLDC GLUCOMTR-MCNC: 230 MG/DL (ref 70–130)
GLUCOSE BLDC GLUCOMTR-MCNC: 247 MG/DL (ref 70–130)
GLUCOSE BLDC GLUCOMTR-MCNC: 362 MG/DL (ref 70–130)
GLUCOSE SERPL-MCNC: 103 MG/DL (ref 65–99)
HCT VFR BLD AUTO: 23.8 % (ref 34–46.6)
HGB BLD-MCNC: 7.4 G/DL (ref 12–15.9)
IMM GRANULOCYTES # BLD AUTO: 0.06 10*3/MM3 (ref 0–0.05)
IMM GRANULOCYTES NFR BLD AUTO: 0.6 % (ref 0–0.5)
LYMPHOCYTES # BLD AUTO: 0.75 10*3/MM3 (ref 0.7–3.1)
LYMPHOCYTES NFR BLD AUTO: 7.3 % (ref 19.6–45.3)
MCH RBC QN AUTO: 26.9 PG (ref 26.6–33)
MCHC RBC AUTO-ENTMCNC: 31.1 G/DL (ref 31.5–35.7)
MCV RBC AUTO: 86.5 FL (ref 79–97)
MONOCYTES # BLD AUTO: 1.07 10*3/MM3 (ref 0.1–0.9)
MONOCYTES NFR BLD AUTO: 10.4 % (ref 5–12)
NEUTROPHILS NFR BLD AUTO: 8.33 10*3/MM3 (ref 1.7–7)
NEUTROPHILS NFR BLD AUTO: 81 % (ref 42.7–76)
NRBC BLD AUTO-RTO: 0 /100 WBC (ref 0–0.2)
PHOSPHATE SERPL-MCNC: 2.7 MG/DL (ref 2.5–4.5)
PLATELET # BLD AUTO: 289 10*3/MM3 (ref 140–450)
PMV BLD AUTO: 10.2 FL (ref 6–12)
POTASSIUM SERPL-SCNC: 3.8 MMOL/L (ref 3.5–5.2)
RBC # BLD AUTO: 2.75 10*6/MM3 (ref 3.77–5.28)
SODIUM SERPL-SCNC: 132 MMOL/L (ref 136–145)
WBC NRBC COR # BLD: 10.28 10*3/MM3 (ref 3.4–10.8)

## 2022-05-31 PROCEDURE — 85025 COMPLETE CBC W/AUTO DIFF WBC: CPT | Performed by: PHYSICAL MEDICINE & REHABILITATION

## 2022-05-31 PROCEDURE — 97110 THERAPEUTIC EXERCISES: CPT

## 2022-05-31 PROCEDURE — 63710000001 INSULIN LISPRO (HUMAN) PER 5 UNITS: Performed by: PHYSICAL MEDICINE & REHABILITATION

## 2022-05-31 PROCEDURE — 80069 RENAL FUNCTION PANEL: CPT | Performed by: PHYSICAL MEDICINE & REHABILITATION

## 2022-05-31 PROCEDURE — 97129 THER IVNTJ 1ST 15 MIN: CPT

## 2022-05-31 PROCEDURE — 97130 THER IVNTJ EA ADDL 15 MIN: CPT

## 2022-05-31 PROCEDURE — 97530 THERAPEUTIC ACTIVITIES: CPT

## 2022-05-31 PROCEDURE — 97535 SELF CARE MNGMENT TRAINING: CPT

## 2022-05-31 PROCEDURE — 63710000001 PREDNISONE PER 5 MG: Performed by: PHYSICAL MEDICINE & REHABILITATION

## 2022-05-31 PROCEDURE — 82962 GLUCOSE BLOOD TEST: CPT

## 2022-05-31 RX ADMIN — ACETAMINOPHEN 325MG 650 MG: 325 TABLET ORAL at 12:30

## 2022-05-31 RX ADMIN — OXYCODONE 2.5 MG: 5 TABLET ORAL at 23:20

## 2022-05-31 RX ADMIN — LEVOTHYROXINE SODIUM 125 MCG: 0.12 TABLET ORAL at 05:55

## 2022-05-31 RX ADMIN — ASPIRIN 81 MG: 81 TABLET, COATED ORAL at 07:45

## 2022-05-31 RX ADMIN — ROPINIROLE HYDROCHLORIDE 0.75 MG: 0.5 TABLET, FILM COATED ORAL at 20:44

## 2022-05-31 RX ADMIN — OXYCODONE 2.5 MG: 5 TABLET ORAL at 12:30

## 2022-05-31 RX ADMIN — INSULIN LISPRO 2 UNITS: 100 INJECTION, SOLUTION INTRAVENOUS; SUBCUTANEOUS at 12:25

## 2022-05-31 RX ADMIN — ACETAMINOPHEN 325MG 650 MG: 325 TABLET ORAL at 02:41

## 2022-05-31 RX ADMIN — SERTRALINE 100 MG: 100 TABLET, FILM COATED ORAL at 07:45

## 2022-05-31 RX ADMIN — TAMSULOSIN HYDROCHLORIDE 0.4 MG: 0.4 CAPSULE ORAL at 07:45

## 2022-05-31 RX ADMIN — OXYCODONE 2.5 MG: 5 TABLET ORAL at 02:41

## 2022-05-31 RX ADMIN — ACETAMINOPHEN 325MG 650 MG: 325 TABLET ORAL at 07:44

## 2022-05-31 RX ADMIN — INSULIN LISPRO 3 UNITS: 100 INJECTION, SOLUTION INTRAVENOUS; SUBCUTANEOUS at 07:44

## 2022-05-31 RX ADMIN — ACETAMINOPHEN 325MG 650 MG: 325 TABLET ORAL at 23:20

## 2022-05-31 RX ADMIN — HYDRALAZINE HYDROCHLORIDE 75 MG: 50 TABLET, FILM COATED ORAL at 14:09

## 2022-05-31 RX ADMIN — INSULIN GLARGINE-YFGN 15 UNITS: 100 INJECTION, SOLUTION SUBCUTANEOUS at 07:45

## 2022-05-31 RX ADMIN — AMLODIPINE BESYLATE 10 MG: 10 TABLET ORAL at 07:45

## 2022-05-31 RX ADMIN — OXYCODONE 2.5 MG: 5 TABLET ORAL at 18:53

## 2022-05-31 RX ADMIN — HYDRALAZINE HYDROCHLORIDE 75 MG: 50 TABLET, FILM COATED ORAL at 05:55

## 2022-05-31 RX ADMIN — GABAPENTIN 200 MG: 100 CAPSULE ORAL at 20:45

## 2022-05-31 RX ADMIN — Medication 1 TABLET: at 07:45

## 2022-05-31 RX ADMIN — LIDOCAINE 1 PATCH: 50 PATCH CUTANEOUS at 07:47

## 2022-05-31 RX ADMIN — CARVEDILOL 25 MG: 25 TABLET, FILM COATED ORAL at 17:14

## 2022-05-31 RX ADMIN — OXYCODONE 2.5 MG: 5 TABLET ORAL at 07:44

## 2022-05-31 RX ADMIN — INSULIN LISPRO 3 UNITS: 100 INJECTION, SOLUTION INTRAVENOUS; SUBCUTANEOUS at 17:13

## 2022-05-31 RX ADMIN — HYDRALAZINE HYDROCHLORIDE 75 MG: 50 TABLET, FILM COATED ORAL at 20:45

## 2022-05-31 RX ADMIN — PANTOPRAZOLE SODIUM 40 MG: 40 TABLET, DELAYED RELEASE ORAL at 05:55

## 2022-05-31 RX ADMIN — CARVEDILOL 25 MG: 25 TABLET, FILM COATED ORAL at 07:45

## 2022-05-31 RX ADMIN — ACETAMINOPHEN 325MG 650 MG: 325 TABLET ORAL at 18:53

## 2022-05-31 RX ADMIN — PREDNISONE 10 MG: 10 TABLET ORAL at 07:45

## 2022-06-01 ENCOUNTER — TELEPHONE (OUTPATIENT)
Dept: ORTHOPEDIC SURGERY | Facility: CLINIC | Age: 57
End: 2022-06-01

## 2022-06-01 LAB
ALBUMIN SERPL-MCNC: 3.1 G/DL (ref 3.5–5.2)
ANION GAP SERPL CALCULATED.3IONS-SCNC: 10.4 MMOL/L (ref 5–15)
BASOPHILS # BLD AUTO: 0.04 10*3/MM3 (ref 0–0.2)
BASOPHILS NFR BLD AUTO: 0.4 % (ref 0–1.5)
BUN SERPL-MCNC: 35 MG/DL (ref 6–20)
BUN/CREAT SERPL: 11.6 (ref 7–25)
CALCIUM SPEC-SCNC: 8.3 MG/DL (ref 8.6–10.5)
CHLORIDE SERPL-SCNC: 92 MMOL/L (ref 98–107)
CO2 SERPL-SCNC: 22.6 MMOL/L (ref 22–29)
CREAT SERPL-MCNC: 3.01 MG/DL (ref 0.57–1)
DEPRECATED RDW RBC AUTO: 52.3 FL (ref 37–54)
EGFRCR SERPLBLD CKD-EPI 2021: 17.7 ML/MIN/1.73
EOSINOPHIL # BLD AUTO: 0.03 10*3/MM3 (ref 0–0.4)
EOSINOPHIL NFR BLD AUTO: 0.3 % (ref 0.3–6.2)
ERYTHROCYTE [DISTWIDTH] IN BLOOD BY AUTOMATED COUNT: 16.8 % (ref 12.3–15.4)
GLUCOSE BLDC GLUCOMTR-MCNC: 190 MG/DL (ref 70–130)
GLUCOSE BLDC GLUCOMTR-MCNC: 201 MG/DL (ref 70–130)
GLUCOSE BLDC GLUCOMTR-MCNC: 202 MG/DL (ref 70–130)
GLUCOSE BLDC GLUCOMTR-MCNC: 278 MG/DL (ref 70–130)
GLUCOSE BLDC GLUCOMTR-MCNC: 96 MG/DL (ref 70–130)
GLUCOSE SERPL-MCNC: 186 MG/DL (ref 65–99)
HCT VFR BLD AUTO: 25.7 % (ref 34–46.6)
HGB BLD-MCNC: 8.2 G/DL (ref 12–15.9)
IMM GRANULOCYTES # BLD AUTO: 0.1 10*3/MM3 (ref 0–0.05)
IMM GRANULOCYTES NFR BLD AUTO: 1 % (ref 0–0.5)
LYMPHOCYTES # BLD AUTO: 0.88 10*3/MM3 (ref 0.7–3.1)
LYMPHOCYTES NFR BLD AUTO: 8.7 % (ref 19.6–45.3)
MCH RBC QN AUTO: 27 PG (ref 26.6–33)
MCHC RBC AUTO-ENTMCNC: 31.9 G/DL (ref 31.5–35.7)
MCV RBC AUTO: 84.5 FL (ref 79–97)
MONOCYTES # BLD AUTO: 1.02 10*3/MM3 (ref 0.1–0.9)
MONOCYTES NFR BLD AUTO: 10.1 % (ref 5–12)
NEUTROPHILS NFR BLD AUTO: 79.5 % (ref 42.7–76)
NEUTROPHILS NFR BLD AUTO: 8.05 10*3/MM3 (ref 1.7–7)
NRBC BLD AUTO-RTO: 0.1 /100 WBC (ref 0–0.2)
PHOSPHATE SERPL-MCNC: 3.5 MG/DL (ref 2.5–4.5)
PLATELET # BLD AUTO: 316 10*3/MM3 (ref 140–450)
PMV BLD AUTO: 10.5 FL (ref 6–12)
POTASSIUM SERPL-SCNC: 4.3 MMOL/L (ref 3.5–5.2)
RBC # BLD AUTO: 3.04 10*6/MM3 (ref 3.77–5.28)
SODIUM SERPL-SCNC: 125 MMOL/L (ref 136–145)
WBC NRBC COR # BLD: 10.12 10*3/MM3 (ref 3.4–10.8)

## 2022-06-01 PROCEDURE — 63710000001 INSULIN LISPRO (HUMAN) PER 5 UNITS: Performed by: PHYSICAL MEDICINE & REHABILITATION

## 2022-06-01 PROCEDURE — 97110 THERAPEUTIC EXERCISES: CPT

## 2022-06-01 PROCEDURE — 97535 SELF CARE MNGMENT TRAINING: CPT

## 2022-06-01 PROCEDURE — 25010000002 EPOETIN ALFA-EPBX 10000 UNIT/ML SOLUTION: Performed by: INTERNAL MEDICINE

## 2022-06-01 PROCEDURE — 97129 THER IVNTJ 1ST 15 MIN: CPT

## 2022-06-01 PROCEDURE — 85025 COMPLETE CBC W/AUTO DIFF WBC: CPT | Performed by: PHYSICAL MEDICINE & REHABILITATION

## 2022-06-01 PROCEDURE — 97530 THERAPEUTIC ACTIVITIES: CPT

## 2022-06-01 PROCEDURE — 80069 RENAL FUNCTION PANEL: CPT | Performed by: PHYSICAL MEDICINE & REHABILITATION

## 2022-06-01 PROCEDURE — 63710000001 PREDNISONE PER 5 MG: Performed by: PHYSICAL MEDICINE & REHABILITATION

## 2022-06-01 PROCEDURE — 97130 THER IVNTJ EA ADDL 15 MIN: CPT

## 2022-06-01 PROCEDURE — 82962 GLUCOSE BLOOD TEST: CPT

## 2022-06-01 RX ORDER — ACETAMINOPHEN 500 MG
500 TABLET ORAL EVERY 4 HOURS PRN
Status: DISCONTINUED | OUTPATIENT
Start: 2022-06-01 | End: 2022-07-22 | Stop reason: HOSPADM

## 2022-06-01 RX ORDER — OXYCODONE HYDROCHLORIDE 5 MG/1
2.5 TABLET ORAL ONCE
Status: COMPLETED | OUTPATIENT
Start: 2022-06-02 | End: 2022-06-01

## 2022-06-01 RX ADMIN — OXYCODONE 2.5 MG: 5 TABLET ORAL at 22:33

## 2022-06-01 RX ADMIN — ASPIRIN 81 MG: 81 TABLET, COATED ORAL at 08:15

## 2022-06-01 RX ADMIN — INSULIN LISPRO 2 UNITS: 100 INJECTION, SOLUTION INTRAVENOUS; SUBCUTANEOUS at 07:36

## 2022-06-01 RX ADMIN — CARVEDILOL 25 MG: 25 TABLET, FILM COATED ORAL at 18:37

## 2022-06-01 RX ADMIN — ROPINIROLE HYDROCHLORIDE 0.75 MG: 0.5 TABLET, FILM COATED ORAL at 21:45

## 2022-06-01 RX ADMIN — OXYCODONE 2.5 MG: 5 TABLET ORAL at 14:40

## 2022-06-01 RX ADMIN — GABAPENTIN 200 MG: 100 CAPSULE ORAL at 21:45

## 2022-06-01 RX ADMIN — EPOETIN ALFA-EPBX 10000 UNITS: 10000 INJECTION, SOLUTION INTRAVENOUS; SUBCUTANEOUS at 18:02

## 2022-06-01 RX ADMIN — INSULIN GLARGINE-YFGN 15 UNITS: 100 INJECTION, SOLUTION SUBCUTANEOUS at 07:36

## 2022-06-01 RX ADMIN — OXYCODONE 2.5 MG: 5 TABLET ORAL at 05:41

## 2022-06-01 RX ADMIN — PREDNISONE 5 MG: 10 TABLET ORAL at 07:37

## 2022-06-01 RX ADMIN — Medication 3 MG: at 22:33

## 2022-06-01 RX ADMIN — LEVOTHYROXINE SODIUM 125 MCG: 0.12 TABLET ORAL at 05:42

## 2022-06-01 RX ADMIN — ACETAMINOPHEN 325MG 650 MG: 325 TABLET ORAL at 05:41

## 2022-06-01 RX ADMIN — Medication 1 TABLET: at 08:15

## 2022-06-01 RX ADMIN — OXYCODONE 2.5 MG: 5 TABLET ORAL at 10:08

## 2022-06-01 RX ADMIN — PANTOPRAZOLE SODIUM 40 MG: 40 TABLET, DELAYED RELEASE ORAL at 05:42

## 2022-06-01 RX ADMIN — INSULIN LISPRO 3 UNITS: 100 INJECTION, SOLUTION INTRAVENOUS; SUBCUTANEOUS at 12:36

## 2022-06-01 RX ADMIN — HYDRALAZINE HYDROCHLORIDE 75 MG: 50 TABLET, FILM COATED ORAL at 21:45

## 2022-06-01 RX ADMIN — TAMSULOSIN HYDROCHLORIDE 0.4 MG: 0.4 CAPSULE ORAL at 08:15

## 2022-06-01 RX ADMIN — ACETAMINOPHEN 325MG 650 MG: 325 TABLET ORAL at 10:12

## 2022-06-01 RX ADMIN — ACETAMINOPHEN 325MG 650 MG: 325 TABLET ORAL at 22:33

## 2022-06-01 RX ADMIN — LIDOCAINE 1 PATCH: 50 PATCH CUTANEOUS at 08:15

## 2022-06-01 RX ADMIN — ACETAMINOPHEN 325MG 650 MG: 325 TABLET ORAL at 14:40

## 2022-06-01 RX ADMIN — HYDRALAZINE HYDROCHLORIDE 75 MG: 50 TABLET, FILM COATED ORAL at 05:47

## 2022-06-01 RX ADMIN — SERTRALINE 100 MG: 100 TABLET, FILM COATED ORAL at 08:15

## 2022-06-01 RX ADMIN — OXYCODONE 2.5 MG: 5 TABLET ORAL at 23:35

## 2022-06-01 RX ADMIN — ACETAMINOPHEN 325MG 650 MG: 325 TABLET ORAL at 18:38

## 2022-06-01 RX ADMIN — OXYCODONE 2.5 MG: 5 TABLET ORAL at 18:38

## 2022-06-01 NOTE — TELEPHONE ENCOUNTER
Caller: RASHAD BOWERS - [facility] CHARGE NURSE     Best call back number: [phone]    Who are you requesting to speak with (clinical staff, provider, specific staff member):    What was the call regarding: PATIENT WAS ADMITTED & SEEN LAST NIGHT 05-31-22 FOR ORTHOPEDIC ISSUE LEFT GROIN PAIN     CHARGE NURSE RASHAD STATED AN ORTHO PROVIDER WAS CONSULTED FOR PATIENT - BUT NO NOTE WAS ENTERED IN Epic.     DR SUAREZ's OFC STATED DR [facility] DID NOT SEE PATIENT & [facility] WAS ON CALL AT [facility]     PRESUMING DR BORJA WAS CONSULTED / SAW PATIENT, PLEASE ENTER CONSULT NOTE IN Epic TO ADVISE CHARGE NURSE RASHAD & DISCHARGE STAFF OF FOLLOW UP CARE INSTRUCTIONS &or IF / WHEN PATIENT NEEDS TO BE SEEN IN OFFICE?     Do you require a callback: PLEASE CALL / LEAVE VMAIL WHEN CONSULT NOTE ENTERED FOR CHARGE NURSE RASHAD / DISCHARGE STAFF     THANKS

## 2022-06-02 ENCOUNTER — APPOINTMENT (OUTPATIENT)
Dept: CT IMAGING | Facility: HOSPITAL | Age: 57
End: 2022-06-02

## 2022-06-02 LAB
ALBUMIN SERPL-MCNC: 3.3 G/DL (ref 3.5–5.2)
ANION GAP SERPL CALCULATED.3IONS-SCNC: 13 MMOL/L (ref 5–15)
BASOPHILS # BLD AUTO: 0.07 10*3/MM3 (ref 0–0.2)
BASOPHILS NFR BLD AUTO: 0.7 % (ref 0–1.5)
BUN SERPL-MCNC: 22 MG/DL (ref 6–20)
BUN/CREAT SERPL: 9.5 (ref 7–25)
CALCIUM SPEC-SCNC: 8.3 MG/DL (ref 8.6–10.5)
CHLORIDE SERPL-SCNC: 96 MMOL/L (ref 98–107)
CO2 SERPL-SCNC: 24 MMOL/L (ref 22–29)
CREAT SERPL-MCNC: 2.31 MG/DL (ref 0.57–1)
DEPRECATED RDW RBC AUTO: 51.9 FL (ref 37–54)
EGFRCR SERPLBLD CKD-EPI 2021: 24.3 ML/MIN/1.73
EOSINOPHIL # BLD AUTO: 0.02 10*3/MM3 (ref 0–0.4)
EOSINOPHIL NFR BLD AUTO: 0.2 % (ref 0.3–6.2)
ERYTHROCYTE [DISTWIDTH] IN BLOOD BY AUTOMATED COUNT: 16.9 % (ref 12.3–15.4)
GLUCOSE BLDC GLUCOMTR-MCNC: 134 MG/DL (ref 70–130)
GLUCOSE BLDC GLUCOMTR-MCNC: 153 MG/DL (ref 70–130)
GLUCOSE BLDC GLUCOMTR-MCNC: 207 MG/DL (ref 70–130)
GLUCOSE BLDC GLUCOMTR-MCNC: 329 MG/DL (ref 70–130)
GLUCOSE SERPL-MCNC: 134 MG/DL (ref 65–99)
HBV SURFACE AG SERPL QL IA: NORMAL
HCT VFR BLD AUTO: 26.7 % (ref 34–46.6)
HGB BLD-MCNC: 8.4 G/DL (ref 12–15.9)
IMM GRANULOCYTES # BLD AUTO: 0.35 10*3/MM3 (ref 0–0.05)
IMM GRANULOCYTES NFR BLD AUTO: 3.4 % (ref 0–0.5)
LYMPHOCYTES # BLD AUTO: 0.7 10*3/MM3 (ref 0.7–3.1)
LYMPHOCYTES NFR BLD AUTO: 6.9 % (ref 19.6–45.3)
MCH RBC QN AUTO: 26.6 PG (ref 26.6–33)
MCHC RBC AUTO-ENTMCNC: 31.5 G/DL (ref 31.5–35.7)
MCV RBC AUTO: 84.5 FL (ref 79–97)
MONOCYTES # BLD AUTO: 1.1 10*3/MM3 (ref 0.1–0.9)
MONOCYTES NFR BLD AUTO: 10.8 % (ref 5–12)
NEUTROPHILS NFR BLD AUTO: 7.95 10*3/MM3 (ref 1.7–7)
NEUTROPHILS NFR BLD AUTO: 78 % (ref 42.7–76)
NRBC BLD AUTO-RTO: 0.1 /100 WBC (ref 0–0.2)
PHOSPHATE SERPL-MCNC: 2.2 MG/DL (ref 2.5–4.5)
PLATELET # BLD AUTO: 337 10*3/MM3 (ref 140–450)
PMV BLD AUTO: 10.6 FL (ref 6–12)
POTASSIUM SERPL-SCNC: 4.4 MMOL/L (ref 3.5–5.2)
RBC # BLD AUTO: 3.16 10*6/MM3 (ref 3.77–5.28)
SODIUM SERPL-SCNC: 133 MMOL/L (ref 136–145)
WBC NRBC COR # BLD: 10.19 10*3/MM3 (ref 3.4–10.8)

## 2022-06-02 PROCEDURE — 85025 COMPLETE CBC W/AUTO DIFF WBC: CPT | Performed by: PHYSICAL MEDICINE & REHABILITATION

## 2022-06-02 PROCEDURE — 99223 1ST HOSP IP/OBS HIGH 75: CPT | Performed by: INTERNAL MEDICINE

## 2022-06-02 PROCEDURE — 87340 HEPATITIS B SURFACE AG IA: CPT | Performed by: INTERNAL MEDICINE

## 2022-06-02 PROCEDURE — 99221 1ST HOSP IP/OBS SF/LOW 40: CPT | Performed by: ORTHOPAEDIC SURGERY

## 2022-06-02 PROCEDURE — 97530 THERAPEUTIC ACTIVITIES: CPT

## 2022-06-02 PROCEDURE — 97129 THER IVNTJ 1ST 15 MIN: CPT

## 2022-06-02 PROCEDURE — 80069 RENAL FUNCTION PANEL: CPT | Performed by: PHYSICAL MEDICINE & REHABILITATION

## 2022-06-02 PROCEDURE — 97130 THER IVNTJ EA ADDL 15 MIN: CPT

## 2022-06-02 PROCEDURE — 74176 CT ABD & PELVIS W/O CONTRAST: CPT

## 2022-06-02 PROCEDURE — 82962 GLUCOSE BLOOD TEST: CPT

## 2022-06-02 PROCEDURE — 97110 THERAPEUTIC EXERCISES: CPT

## 2022-06-02 PROCEDURE — 70490 CT SOFT TISSUE NECK W/O DYE: CPT

## 2022-06-02 PROCEDURE — 63710000001 INSULIN LISPRO (HUMAN) PER 5 UNITS: Performed by: PHYSICAL MEDICINE & REHABILITATION

## 2022-06-02 PROCEDURE — 71250 CT THORAX DX C-: CPT

## 2022-06-02 PROCEDURE — 97535 SELF CARE MNGMENT TRAINING: CPT

## 2022-06-02 PROCEDURE — 63710000001 PREDNISONE PER 5 MG: Performed by: PHYSICAL MEDICINE & REHABILITATION

## 2022-06-02 RX ORDER — BISACODYL 10 MG
10 SUPPOSITORY, RECTAL RECTAL DAILY PRN
Status: DISCONTINUED | OUTPATIENT
Start: 2022-06-02 | End: 2022-07-01

## 2022-06-02 RX ORDER — OXYCODONE HYDROCHLORIDE 5 MG/1
2.5 TABLET ORAL ONCE
Status: COMPLETED | OUTPATIENT
Start: 2022-06-02 | End: 2022-06-02

## 2022-06-02 RX ORDER — PREDNISONE 10 MG/1
10 TABLET ORAL
Status: DISCONTINUED | OUTPATIENT
Start: 2022-06-03 | End: 2022-06-03

## 2022-06-02 RX ORDER — HYDRALAZINE HYDROCHLORIDE 10 MG/1
10 TABLET, FILM COATED ORAL EVERY 8 HOURS PRN
Status: DISCONTINUED | OUTPATIENT
Start: 2022-06-02 | End: 2022-07-22 | Stop reason: HOSPADM

## 2022-06-02 RX ORDER — ATORVASTATIN CALCIUM 20 MG/1
40 TABLET, FILM COATED ORAL DAILY
Status: DISCONTINUED | OUTPATIENT
Start: 2022-06-02 | End: 2022-06-24

## 2022-06-02 RX ORDER — SENNA PLUS 8.6 MG/1
1 TABLET ORAL 2 TIMES DAILY
Status: DISCONTINUED | OUTPATIENT
Start: 2022-06-02 | End: 2022-06-09

## 2022-06-02 RX ADMIN — OXYCODONE 2.5 MG: 5 TABLET ORAL at 08:15

## 2022-06-02 RX ADMIN — OXYCODONE 2.5 MG: 5 TABLET ORAL at 17:17

## 2022-06-02 RX ADMIN — HYDRALAZINE HYDROCHLORIDE 75 MG: 50 TABLET, FILM COATED ORAL at 12:32

## 2022-06-02 RX ADMIN — OXYCODONE 2.5 MG: 5 TABLET ORAL at 20:52

## 2022-06-02 RX ADMIN — TAMSULOSIN HYDROCHLORIDE 0.4 MG: 0.4 CAPSULE ORAL at 08:18

## 2022-06-02 RX ADMIN — OXYCODONE 2.5 MG: 5 TABLET ORAL at 03:54

## 2022-06-02 RX ADMIN — PREDNISONE 5 MG: 10 TABLET ORAL at 08:15

## 2022-06-02 RX ADMIN — OXYCODONE 2.5 MG: 5 TABLET ORAL at 22:22

## 2022-06-02 RX ADMIN — ATORVASTATIN CALCIUM 40 MG: 20 TABLET, FILM COATED ORAL at 17:17

## 2022-06-02 RX ADMIN — LEVOTHYROXINE SODIUM 125 MCG: 0.12 TABLET ORAL at 05:54

## 2022-06-02 RX ADMIN — INSULIN LISPRO 2 UNITS: 100 INJECTION, SOLUTION INTRAVENOUS; SUBCUTANEOUS at 11:57

## 2022-06-02 RX ADMIN — HYDRALAZINE HYDROCHLORIDE 75 MG: 50 TABLET, FILM COATED ORAL at 05:46

## 2022-06-02 RX ADMIN — ACETAMINOPHEN 500 MG: 500 TABLET, FILM COATED ORAL at 19:46

## 2022-06-02 RX ADMIN — Medication 1 TABLET: at 08:15

## 2022-06-02 RX ADMIN — ROPINIROLE HYDROCHLORIDE 0.75 MG: 0.5 TABLET, FILM COATED ORAL at 22:20

## 2022-06-02 RX ADMIN — PANTOPRAZOLE SODIUM 40 MG: 40 TABLET, DELAYED RELEASE ORAL at 05:46

## 2022-06-02 RX ADMIN — ACETAMINOPHEN 500 MG: 500 TABLET, FILM COATED ORAL at 08:15

## 2022-06-02 RX ADMIN — ASPIRIN 81 MG: 81 TABLET, COATED ORAL at 08:14

## 2022-06-02 RX ADMIN — AMLODIPINE BESYLATE 10 MG: 10 TABLET ORAL at 08:15

## 2022-06-02 RX ADMIN — HYDRALAZINE HYDROCHLORIDE 75 MG: 50 TABLET, FILM COATED ORAL at 22:21

## 2022-06-02 RX ADMIN — SERTRALINE 100 MG: 100 TABLET, FILM COATED ORAL at 08:15

## 2022-06-02 RX ADMIN — CARVEDILOL 25 MG: 25 TABLET, FILM COATED ORAL at 07:17

## 2022-06-02 RX ADMIN — ACETAMINOPHEN 500 MG: 500 TABLET, FILM COATED ORAL at 02:37

## 2022-06-02 RX ADMIN — INSULIN GLARGINE-YFGN 15 UNITS: 100 INJECTION, SOLUTION SUBCUTANEOUS at 08:17

## 2022-06-02 RX ADMIN — BISACODYL 10 MG: 10 SUPPOSITORY RECTAL at 04:08

## 2022-06-02 RX ADMIN — OXYCODONE 2.5 MG: 5 TABLET ORAL at 12:16

## 2022-06-02 RX ADMIN — GABAPENTIN 200 MG: 100 CAPSULE ORAL at 22:20

## 2022-06-02 RX ADMIN — INSULIN LISPRO 5 UNITS: 100 INJECTION, SOLUTION INTRAVENOUS; SUBCUTANEOUS at 08:14

## 2022-06-02 RX ADMIN — Medication 3 MG: at 22:20

## 2022-06-02 RX ADMIN — LIDOCAINE 1 PATCH: 50 PATCH CUTANEOUS at 08:15

## 2022-06-02 RX ADMIN — SENNOSIDES 1 TABLET: 8.6 TABLET, FILM COATED ORAL at 22:22

## 2022-06-03 ENCOUNTER — APPOINTMENT (OUTPATIENT)
Dept: MRI IMAGING | Facility: HOSPITAL | Age: 57
End: 2022-06-03

## 2022-06-03 LAB
ALBUMIN SERPL-MCNC: 3.2 G/DL (ref 3.5–5.2)
ANION GAP SERPL CALCULATED.3IONS-SCNC: 12 MMOL/L (ref 5–15)
BACTERIA UR QL AUTO: ABNORMAL /HPF
BASOPHILS # BLD AUTO: 0.05 10*3/MM3 (ref 0–0.2)
BASOPHILS NFR BLD AUTO: 0.5 % (ref 0–1.5)
BILIRUB UR QL STRIP: NEGATIVE
BUN SERPL-MCNC: 14 MG/DL (ref 6–20)
BUN/CREAT SERPL: 8.8 (ref 7–25)
CALCIUM SPEC-SCNC: 8.2 MG/DL (ref 8.6–10.5)
CHLORIDE SERPL-SCNC: 96 MMOL/L (ref 98–107)
CLARITY UR: CLEAR
CO2 SERPL-SCNC: 24 MMOL/L (ref 22–29)
COLOR UR: ABNORMAL
CREAT SERPL-MCNC: 1.6 MG/DL (ref 0.57–1)
CRP SERPL-MCNC: 3.92 MG/DL (ref 0–0.5)
DEPRECATED RDW RBC AUTO: 49.4 FL (ref 37–54)
EGFRCR SERPLBLD CKD-EPI 2021: 37.7 ML/MIN/1.73
EOSINOPHIL # BLD AUTO: 0.03 10*3/MM3 (ref 0–0.4)
EOSINOPHIL NFR BLD AUTO: 0.3 % (ref 0.3–6.2)
ERYTHROCYTE [DISTWIDTH] IN BLOOD BY AUTOMATED COUNT: 16.6 % (ref 12.3–15.4)
ERYTHROCYTE [SEDIMENTATION RATE] IN BLOOD: 36 MM/HR (ref 0–30)
FERRITIN SERPL-MCNC: 657 NG/ML (ref 13–150)
FOLATE SERPL-MCNC: >20 NG/ML (ref 4.78–24.2)
GLUCOSE BLDC GLUCOMTR-MCNC: 128 MG/DL (ref 70–130)
GLUCOSE BLDC GLUCOMTR-MCNC: 134 MG/DL (ref 70–130)
GLUCOSE BLDC GLUCOMTR-MCNC: 148 MG/DL (ref 70–130)
GLUCOSE BLDC GLUCOMTR-MCNC: 204 MG/DL (ref 70–130)
GLUCOSE SERPL-MCNC: 138 MG/DL (ref 65–99)
GLUCOSE UR STRIP-MCNC: ABNORMAL MG/DL
HCT VFR BLD AUTO: 26.4 % (ref 34–46.6)
HGB BLD-MCNC: 8.6 G/DL (ref 12–15.9)
HGB UR QL STRIP.AUTO: NEGATIVE
HYALINE CASTS UR QL AUTO: ABNORMAL /LPF
IRON 24H UR-MRATE: 23 MCG/DL (ref 37–145)
IRON SATN MFR SERPL: 10 % (ref 20–50)
KETONES UR QL STRIP: NEGATIVE
LDH SERPL-CCNC: 197 U/L (ref 135–214)
LEUKOCYTE ESTERASE UR QL STRIP.AUTO: ABNORMAL
LYMPHOCYTES # BLD AUTO: 0.79 10*3/MM3 (ref 0.7–3.1)
LYMPHOCYTES NFR BLD AUTO: 7.2 % (ref 19.6–45.3)
MCH RBC QN AUTO: 26.8 PG (ref 26.6–33)
MCHC RBC AUTO-ENTMCNC: 32.6 G/DL (ref 31.5–35.7)
MCV RBC AUTO: 82.2 FL (ref 79–97)
MONOCYTES # BLD AUTO: 0.94 10*3/MM3 (ref 0.1–0.9)
MONOCYTES NFR BLD AUTO: 8.5 % (ref 5–12)
NEUTROPHILS NFR BLD AUTO: 79.1 % (ref 42.7–76)
NEUTROPHILS NFR BLD AUTO: 8.71 10*3/MM3 (ref 1.7–7)
NITRITE UR QL STRIP: NEGATIVE
PH UR STRIP.AUTO: 6.5 [PH] (ref 5–8)
PHOSPHATE SERPL-MCNC: 1.8 MG/DL (ref 2.5–4.5)
PLATELET # BLD AUTO: 260 10*3/MM3 (ref 140–450)
PMV BLD AUTO: 10.5 FL (ref 6–12)
POTASSIUM SERPL-SCNC: 3.5 MMOL/L (ref 3.5–5.2)
PROT UR QL STRIP: ABNORMAL
RBC # BLD AUTO: 3.21 10*6/MM3 (ref 3.77–5.28)
RBC # UR STRIP: ABNORMAL /HPF
REF LAB TEST METHOD: ABNORMAL
SODIUM SERPL-SCNC: 132 MMOL/L (ref 136–145)
SP GR UR STRIP: 1.02 (ref 1–1.03)
SQUAMOUS #/AREA URNS HPF: ABNORMAL /HPF
TIBC SERPL-MCNC: 232 MCG/DL (ref 298–536)
TRANSFERRIN SERPL-MCNC: 156 MG/DL (ref 200–360)
UROBILINOGEN UR QL STRIP: ABNORMAL
VIT B12 BLD-MCNC: 545 PG/ML (ref 211–946)
WBC # UR STRIP: ABNORMAL /HPF
WBC NRBC COR # BLD: 11 10*3/MM3 (ref 3.4–10.8)

## 2022-06-03 PROCEDURE — 85025 COMPLETE CBC W/AUTO DIFF WBC: CPT | Performed by: PHYSICAL MEDICINE & REHABILITATION

## 2022-06-03 PROCEDURE — 97535 SELF CARE MNGMENT TRAINING: CPT

## 2022-06-03 PROCEDURE — 81001 URINALYSIS AUTO W/SCOPE: CPT | Performed by: PHYSICAL MEDICINE & REHABILITATION

## 2022-06-03 PROCEDURE — 25010000002 EPOETIN ALFA-EPBX 10000 UNIT/ML SOLUTION: Performed by: INTERNAL MEDICINE

## 2022-06-03 PROCEDURE — 86140 C-REACTIVE PROTEIN: CPT | Performed by: INTERNAL MEDICINE

## 2022-06-03 PROCEDURE — 84466 ASSAY OF TRANSFERRIN: CPT | Performed by: INTERNAL MEDICINE

## 2022-06-03 PROCEDURE — 85652 RBC SED RATE AUTOMATED: CPT | Performed by: INTERNAL MEDICINE

## 2022-06-03 PROCEDURE — 97129 THER IVNTJ 1ST 15 MIN: CPT

## 2022-06-03 PROCEDURE — 82607 VITAMIN B-12: CPT | Performed by: INTERNAL MEDICINE

## 2022-06-03 PROCEDURE — 82728 ASSAY OF FERRITIN: CPT | Performed by: INTERNAL MEDICINE

## 2022-06-03 PROCEDURE — 72195 MRI PELVIS W/O DYE: CPT

## 2022-06-03 PROCEDURE — 87086 URINE CULTURE/COLONY COUNT: CPT | Performed by: PHYSICAL MEDICINE & REHABILITATION

## 2022-06-03 PROCEDURE — 82962 GLUCOSE BLOOD TEST: CPT

## 2022-06-03 PROCEDURE — 97110 THERAPEUTIC EXERCISES: CPT

## 2022-06-03 PROCEDURE — 83540 ASSAY OF IRON: CPT | Performed by: INTERNAL MEDICINE

## 2022-06-03 PROCEDURE — 85007 BL SMEAR W/DIFF WBC COUNT: CPT | Performed by: PHYSICAL MEDICINE & REHABILITATION

## 2022-06-03 PROCEDURE — 82746 ASSAY OF FOLIC ACID SERUM: CPT | Performed by: INTERNAL MEDICINE

## 2022-06-03 PROCEDURE — 63710000001 PREDNISONE PER 5 MG: Performed by: PHYSICAL MEDICINE & REHABILITATION

## 2022-06-03 PROCEDURE — 97130 THER IVNTJ EA ADDL 15 MIN: CPT

## 2022-06-03 PROCEDURE — 25010000002 HEPARIN (PORCINE) PER 1000 UNITS: Performed by: STUDENT IN AN ORGANIZED HEALTH CARE EDUCATION/TRAINING PROGRAM

## 2022-06-03 PROCEDURE — 73718 MRI LOWER EXTREMITY W/O DYE: CPT

## 2022-06-03 PROCEDURE — 80069 RENAL FUNCTION PANEL: CPT | Performed by: PHYSICAL MEDICINE & REHABILITATION

## 2022-06-03 PROCEDURE — 83615 LACTATE (LD) (LDH) ENZYME: CPT | Performed by: INTERNAL MEDICINE

## 2022-06-03 RX ORDER — OXYCODONE HYDROCHLORIDE 5 MG/1
5 TABLET ORAL ONCE AS NEEDED
Status: COMPLETED | OUTPATIENT
Start: 2022-06-03 | End: 2022-06-03

## 2022-06-03 RX ORDER — CARVEDILOL 12.5 MG/1
12.5 TABLET ORAL 2 TIMES DAILY WITH MEALS
Status: DISCONTINUED | OUTPATIENT
Start: 2022-06-03 | End: 2022-06-23

## 2022-06-03 RX ORDER — HYDRALAZINE HYDROCHLORIDE 50 MG/1
100 TABLET, FILM COATED ORAL EVERY 8 HOURS SCHEDULED
Status: DISCONTINUED | OUTPATIENT
Start: 2022-06-03 | End: 2022-07-22 | Stop reason: HOSPADM

## 2022-06-03 RX ADMIN — OXYCODONE 2.5 MG: 5 TABLET ORAL at 05:21

## 2022-06-03 RX ADMIN — OXYCODONE 2.5 MG: 5 TABLET ORAL at 13:46

## 2022-06-03 RX ADMIN — ACETAMINOPHEN 500 MG: 500 TABLET, FILM COATED ORAL at 09:35

## 2022-06-03 RX ADMIN — EPOETIN ALFA-EPBX 10000 UNITS: 10000 INJECTION, SOLUTION INTRAVENOUS; SUBCUTANEOUS at 15:52

## 2022-06-03 RX ADMIN — ROPINIROLE HYDROCHLORIDE 0.75 MG: 0.5 TABLET, FILM COATED ORAL at 21:20

## 2022-06-03 RX ADMIN — OXYCODONE 2.5 MG: 5 TABLET ORAL at 23:01

## 2022-06-03 RX ADMIN — INSULIN GLARGINE-YFGN 15 UNITS: 100 INJECTION, SOLUTION SUBCUTANEOUS at 09:05

## 2022-06-03 RX ADMIN — SENNOSIDES 1 TABLET: 8.6 TABLET, FILM COATED ORAL at 21:20

## 2022-06-03 RX ADMIN — PANTOPRAZOLE SODIUM 40 MG: 40 TABLET, DELAYED RELEASE ORAL at 05:22

## 2022-06-03 RX ADMIN — Medication 1 TABLET: at 09:03

## 2022-06-03 RX ADMIN — SENNOSIDES 1 TABLET: 8.6 TABLET, FILM COATED ORAL at 09:03

## 2022-06-03 RX ADMIN — GABAPENTIN 200 MG: 100 CAPSULE ORAL at 21:20

## 2022-06-03 RX ADMIN — ATORVASTATIN CALCIUM 40 MG: 20 TABLET, FILM COATED ORAL at 09:03

## 2022-06-03 RX ADMIN — SERTRALINE 100 MG: 100 TABLET, FILM COATED ORAL at 09:03

## 2022-06-03 RX ADMIN — HEPARIN SODIUM 3800 UNITS: 1000 INJECTION INTRAVENOUS; SUBCUTANEOUS at 15:53

## 2022-06-03 RX ADMIN — OXYCODONE 5 MG: 5 TABLET ORAL at 09:34

## 2022-06-03 RX ADMIN — ACETAMINOPHEN 500 MG: 500 TABLET, FILM COATED ORAL at 18:26

## 2022-06-03 RX ADMIN — OXYCODONE 2.5 MG: 5 TABLET ORAL at 18:26

## 2022-06-03 RX ADMIN — HYDRALAZINE HYDROCHLORIDE 100 MG: 50 TABLET, FILM COATED ORAL at 18:26

## 2022-06-03 RX ADMIN — ACETAMINOPHEN 500 MG: 500 TABLET, FILM COATED ORAL at 05:22

## 2022-06-03 RX ADMIN — TAMSULOSIN HYDROCHLORIDE 0.4 MG: 0.4 CAPSULE ORAL at 09:03

## 2022-06-03 RX ADMIN — CARVEDILOL 12.5 MG: 12.5 TABLET, FILM COATED ORAL at 18:26

## 2022-06-03 RX ADMIN — Medication 3 MG: at 21:20

## 2022-06-03 RX ADMIN — AMLODIPINE BESYLATE 10 MG: 10 TABLET ORAL at 09:03

## 2022-06-03 RX ADMIN — CARVEDILOL 25 MG: 25 TABLET, FILM COATED ORAL at 09:00

## 2022-06-03 RX ADMIN — PREDNISONE 10 MG: 10 TABLET ORAL at 09:00

## 2022-06-03 RX ADMIN — LEVOTHYROXINE SODIUM 125 MCG: 0.12 TABLET ORAL at 05:21

## 2022-06-03 RX ADMIN — HYDRALAZINE HYDROCHLORIDE 75 MG: 50 TABLET, FILM COATED ORAL at 05:21

## 2022-06-03 RX ADMIN — ASPIRIN 81 MG: 81 TABLET, COATED ORAL at 09:03

## 2022-06-04 LAB
ALBUMIN SERPL-MCNC: 2.5 G/DL (ref 3.5–5.2)
ANION GAP SERPL CALCULATED.3IONS-SCNC: 11.9 MMOL/L (ref 5–15)
BACTERIA SPEC AEROBE CULT: NO GROWTH
BUN SERPL-MCNC: 19 MG/DL (ref 6–20)
BUN/CREAT SERPL: 10.1 (ref 7–25)
CALCIUM SPEC-SCNC: 8 MG/DL (ref 8.6–10.5)
CHLORIDE SERPL-SCNC: 100 MMOL/L (ref 98–107)
CO2 SERPL-SCNC: 24.1 MMOL/L (ref 22–29)
CREAT SERPL-MCNC: 1.89 MG/DL (ref 0.57–1)
DEPRECATED RDW RBC AUTO: 52.6 FL (ref 37–54)
EGFRCR SERPLBLD CKD-EPI 2021: 30.9 ML/MIN/1.73
ERYTHROCYTE [DISTWIDTH] IN BLOOD BY AUTOMATED COUNT: 16.9 % (ref 12.3–15.4)
GLUCOSE BLDC GLUCOMTR-MCNC: 133 MG/DL (ref 70–130)
GLUCOSE BLDC GLUCOMTR-MCNC: 151 MG/DL (ref 70–130)
GLUCOSE BLDC GLUCOMTR-MCNC: 260 MG/DL (ref 70–130)
GLUCOSE SERPL-MCNC: 190 MG/DL (ref 65–99)
HCT VFR BLD AUTO: 27.2 % (ref 34–46.6)
HGB BLD-MCNC: 8.5 G/DL (ref 12–15.9)
HYPOCHROMIA BLD QL: ABNORMAL
LYMPHOCYTES # BLD MANUAL: 1.03 10*3/MM3 (ref 0.7–3.1)
LYMPHOCYTES NFR BLD MANUAL: 9.1 % (ref 5–12)
MCH RBC QN AUTO: 26.9 PG (ref 26.6–33)
MCHC RBC AUTO-ENTMCNC: 31.3 G/DL (ref 31.5–35.7)
MCV RBC AUTO: 86.1 FL (ref 79–97)
MONOCYTES # BLD: 1.03 10*3/MM3 (ref 0.1–0.9)
NEUTROPHILS # BLD AUTO: 9.29 10*3/MM3 (ref 1.7–7)
NEUTROPHILS NFR BLD MANUAL: 81.8 % (ref 42.7–76)
PHOSPHATE SERPL-MCNC: 2.2 MG/DL (ref 2.5–4.5)
PLAT MORPH BLD: NORMAL
PLATELET # BLD AUTO: 311 10*3/MM3 (ref 140–450)
PMV BLD AUTO: 10.5 FL (ref 6–12)
POLYCHROMASIA BLD QL SMEAR: ABNORMAL
POTASSIUM SERPL-SCNC: 3.7 MMOL/L (ref 3.5–5.2)
RBC # BLD AUTO: 3.16 10*6/MM3 (ref 3.77–5.28)
SODIUM SERPL-SCNC: 136 MMOL/L (ref 136–145)
VARIANT LYMPHS NFR BLD MANUAL: 9.1 % (ref 19.6–45.3)
WBC MORPH BLD: NORMAL
WBC NRBC COR # BLD: 11.36 10*3/MM3 (ref 3.4–10.8)

## 2022-06-04 PROCEDURE — 85007 BL SMEAR W/DIFF WBC COUNT: CPT | Performed by: PHYSICAL MEDICINE & REHABILITATION

## 2022-06-04 PROCEDURE — 82962 GLUCOSE BLOOD TEST: CPT

## 2022-06-04 PROCEDURE — 97110 THERAPEUTIC EXERCISES: CPT

## 2022-06-04 PROCEDURE — 97530 THERAPEUTIC ACTIVITIES: CPT

## 2022-06-04 PROCEDURE — 99231 SBSQ HOSP IP/OBS SF/LOW 25: CPT | Performed by: INTERNAL MEDICINE

## 2022-06-04 PROCEDURE — 80069 RENAL FUNCTION PANEL: CPT | Performed by: PHYSICAL MEDICINE & REHABILITATION

## 2022-06-04 PROCEDURE — 97535 SELF CARE MNGMENT TRAINING: CPT

## 2022-06-04 PROCEDURE — 85025 COMPLETE CBC W/AUTO DIFF WBC: CPT | Performed by: PHYSICAL MEDICINE & REHABILITATION

## 2022-06-04 PROCEDURE — 92507 TX SP LANG VOICE COMM INDIV: CPT

## 2022-06-04 RX ORDER — LOSARTAN POTASSIUM 25 MG/1
25 TABLET ORAL
Status: DISCONTINUED | OUTPATIENT
Start: 2022-06-04 | End: 2022-06-07

## 2022-06-04 RX ORDER — OXYCODONE HYDROCHLORIDE 5 MG/1
5 TABLET ORAL EVERY 4 HOURS PRN
Status: DISCONTINUED | OUTPATIENT
Start: 2022-06-04 | End: 2022-06-07

## 2022-06-04 RX ORDER — GABAPENTIN 100 MG/1
200 CAPSULE ORAL 3 TIMES DAILY
Status: DISCONTINUED | OUTPATIENT
Start: 2022-06-04 | End: 2022-06-07

## 2022-06-04 RX ADMIN — LEVOTHYROXINE SODIUM 125 MCG: 0.12 TABLET ORAL at 05:52

## 2022-06-04 RX ADMIN — Medication 1 TABLET: at 09:08

## 2022-06-04 RX ADMIN — OXYCODONE 2.5 MG: 5 TABLET ORAL at 09:13

## 2022-06-04 RX ADMIN — SENNOSIDES 1 TABLET: 8.6 TABLET, FILM COATED ORAL at 09:08

## 2022-06-04 RX ADMIN — ATORVASTATIN CALCIUM 40 MG: 20 TABLET, FILM COATED ORAL at 09:08

## 2022-06-04 RX ADMIN — CARVEDILOL 12.5 MG: 12.5 TABLET, FILM COATED ORAL at 17:02

## 2022-06-04 RX ADMIN — CARVEDILOL 12.5 MG: 12.5 TABLET, FILM COATED ORAL at 09:08

## 2022-06-04 RX ADMIN — PANTOPRAZOLE SODIUM 40 MG: 40 TABLET, DELAYED RELEASE ORAL at 05:52

## 2022-06-04 RX ADMIN — OXYCODONE 2.5 MG: 5 TABLET ORAL at 04:29

## 2022-06-04 RX ADMIN — HYDRALAZINE HYDROCHLORIDE 100 MG: 50 TABLET, FILM COATED ORAL at 17:02

## 2022-06-04 RX ADMIN — LIDOCAINE 1 PATCH: 50 PATCH CUTANEOUS at 09:07

## 2022-06-04 RX ADMIN — OXYCODONE 5 MG: 5 TABLET ORAL at 15:11

## 2022-06-04 RX ADMIN — AMLODIPINE BESYLATE 10 MG: 10 TABLET ORAL at 06:22

## 2022-06-04 RX ADMIN — OXYCODONE 5 MG: 5 TABLET ORAL at 21:51

## 2022-06-04 RX ADMIN — SERTRALINE 100 MG: 100 TABLET, FILM COATED ORAL at 09:08

## 2022-06-04 RX ADMIN — ROPINIROLE HYDROCHLORIDE 0.75 MG: 0.5 TABLET, FILM COATED ORAL at 21:52

## 2022-06-04 RX ADMIN — ASPIRIN 81 MG: 81 TABLET, COATED ORAL at 09:12

## 2022-06-04 RX ADMIN — INSULIN GLARGINE-YFGN 15 UNITS: 100 INJECTION, SOLUTION SUBCUTANEOUS at 09:08

## 2022-06-04 RX ADMIN — Medication 3 MG: at 21:52

## 2022-06-04 RX ADMIN — HYDRALAZINE HYDROCHLORIDE 10 MG: 10 TABLET, FILM COATED ORAL at 07:28

## 2022-06-04 RX ADMIN — HYDRALAZINE HYDROCHLORIDE 100 MG: 50 TABLET, FILM COATED ORAL at 05:51

## 2022-06-04 RX ADMIN — GABAPENTIN 200 MG: 100 CAPSULE ORAL at 21:51

## 2022-06-04 RX ADMIN — TAMSULOSIN HYDROCHLORIDE 0.4 MG: 0.4 CAPSULE ORAL at 09:08

## 2022-06-04 RX ADMIN — LOSARTAN POTASSIUM 25 MG: 25 TABLET, FILM COATED ORAL at 14:56

## 2022-06-05 LAB
ALBUMIN SERPL-MCNC: 2.6 G/DL (ref 3.5–5.2)
ANION GAP SERPL CALCULATED.3IONS-SCNC: 13.2 MMOL/L (ref 5–15)
BASOPHILS # BLD AUTO: 0.07 10*3/MM3 (ref 0–0.2)
BASOPHILS NFR BLD AUTO: 0.5 % (ref 0–1.5)
BUN SERPL-MCNC: 26 MG/DL (ref 6–20)
BUN/CREAT SERPL: 8.8 (ref 7–25)
CALCIUM SPEC-SCNC: 8.2 MG/DL (ref 8.6–10.5)
CHLORIDE SERPL-SCNC: 95 MMOL/L (ref 98–107)
CO2 SERPL-SCNC: 22.8 MMOL/L (ref 22–29)
CREAT SERPL-MCNC: 2.95 MG/DL (ref 0.57–1)
DEPRECATED RDW RBC AUTO: 54.2 FL (ref 37–54)
EGFRCR SERPLBLD CKD-EPI 2021: 18.1 ML/MIN/1.73
EOSINOPHIL # BLD AUTO: 0.06 10*3/MM3 (ref 0–0.4)
EOSINOPHIL NFR BLD AUTO: 0.4 % (ref 0.3–6.2)
ERYTHROCYTE [DISTWIDTH] IN BLOOD BY AUTOMATED COUNT: 16.9 % (ref 12.3–15.4)
GLUCOSE BLDC GLUCOMTR-MCNC: 143 MG/DL (ref 70–130)
GLUCOSE BLDC GLUCOMTR-MCNC: 221 MG/DL (ref 70–130)
GLUCOSE BLDC GLUCOMTR-MCNC: 261 MG/DL (ref 70–130)
GLUCOSE BLDC GLUCOMTR-MCNC: 300 MG/DL (ref 70–130)
GLUCOSE SERPL-MCNC: 151 MG/DL (ref 65–99)
HCT VFR BLD AUTO: 27.5 % (ref 34–46.6)
HGB BLD-MCNC: 8.3 G/DL (ref 12–15.9)
IMM GRANULOCYTES # BLD AUTO: 0.42 10*3/MM3 (ref 0–0.05)
IMM GRANULOCYTES NFR BLD AUTO: 3.1 % (ref 0–0.5)
LYMPHOCYTES # BLD AUTO: 1.19 10*3/MM3 (ref 0.7–3.1)
LYMPHOCYTES NFR BLD AUTO: 8.8 % (ref 19.6–45.3)
MCH RBC QN AUTO: 26.3 PG (ref 26.6–33)
MCHC RBC AUTO-ENTMCNC: 30.2 G/DL (ref 31.5–35.7)
MCV RBC AUTO: 87 FL (ref 79–97)
MONOCYTES # BLD AUTO: 1.1 10*3/MM3 (ref 0.1–0.9)
MONOCYTES NFR BLD AUTO: 8.1 % (ref 5–12)
NEUTROPHILS NFR BLD AUTO: 10.75 10*3/MM3 (ref 1.7–7)
NEUTROPHILS NFR BLD AUTO: 79.1 % (ref 42.7–76)
NRBC BLD AUTO-RTO: 0 /100 WBC (ref 0–0.2)
PHOSPHATE SERPL-MCNC: 2.9 MG/DL (ref 2.5–4.5)
PLATELET # BLD AUTO: 304 10*3/MM3 (ref 140–450)
PMV BLD AUTO: 10.3 FL (ref 6–12)
POTASSIUM SERPL-SCNC: 3.9 MMOL/L (ref 3.5–5.2)
RBC # BLD AUTO: 3.16 10*6/MM3 (ref 3.77–5.28)
SODIUM SERPL-SCNC: 131 MMOL/L (ref 136–145)
WBC NRBC COR # BLD: 13.59 10*3/MM3 (ref 3.4–10.8)

## 2022-06-05 PROCEDURE — 80069 RENAL FUNCTION PANEL: CPT | Performed by: PHYSICAL MEDICINE & REHABILITATION

## 2022-06-05 PROCEDURE — 99231 SBSQ HOSP IP/OBS SF/LOW 25: CPT | Performed by: INTERNAL MEDICINE

## 2022-06-05 PROCEDURE — 82962 GLUCOSE BLOOD TEST: CPT

## 2022-06-05 PROCEDURE — 63710000001 INSULIN LISPRO (HUMAN) PER 5 UNITS: Performed by: PHYSICAL MEDICINE & REHABILITATION

## 2022-06-05 PROCEDURE — 85025 COMPLETE CBC W/AUTO DIFF WBC: CPT | Performed by: PHYSICAL MEDICINE & REHABILITATION

## 2022-06-05 PROCEDURE — 0T9B70Z DRAINAGE OF BLADDER WITH DRAINAGE DEVICE, VIA NATURAL OR ARTIFICIAL OPENING: ICD-10-PCS | Performed by: REGISTERED NURSE

## 2022-06-05 RX ADMIN — ACETAMINOPHEN 500 MG: 500 TABLET, FILM COATED ORAL at 12:26

## 2022-06-05 RX ADMIN — ASPIRIN 81 MG: 81 TABLET, COATED ORAL at 09:05

## 2022-06-05 RX ADMIN — HYDRALAZINE HYDROCHLORIDE 100 MG: 50 TABLET, FILM COATED ORAL at 17:40

## 2022-06-05 RX ADMIN — OXYCODONE 5 MG: 5 TABLET ORAL at 10:27

## 2022-06-05 RX ADMIN — CARVEDILOL 12.5 MG: 12.5 TABLET, FILM COATED ORAL at 09:05

## 2022-06-05 RX ADMIN — OXYCODONE 5 MG: 5 TABLET ORAL at 19:42

## 2022-06-05 RX ADMIN — SENNOSIDES 1 TABLET: 8.6 TABLET, FILM COATED ORAL at 19:43

## 2022-06-05 RX ADMIN — PANTOPRAZOLE SODIUM 40 MG: 40 TABLET, DELAYED RELEASE ORAL at 05:59

## 2022-06-05 RX ADMIN — ROPINIROLE HYDROCHLORIDE 0.75 MG: 0.5 TABLET, FILM COATED ORAL at 19:42

## 2022-06-05 RX ADMIN — HYDRALAZINE HYDROCHLORIDE 10 MG: 10 TABLET, FILM COATED ORAL at 06:33

## 2022-06-05 RX ADMIN — INSULIN LISPRO 4 UNITS: 100 INJECTION, SOLUTION INTRAVENOUS; SUBCUTANEOUS at 12:26

## 2022-06-05 RX ADMIN — OXYCODONE 5 MG: 5 TABLET ORAL at 05:59

## 2022-06-05 RX ADMIN — HYDRALAZINE HYDROCHLORIDE 100 MG: 50 TABLET, FILM COATED ORAL at 09:06

## 2022-06-05 RX ADMIN — Medication 1 TABLET: at 09:05

## 2022-06-05 RX ADMIN — SENNOSIDES 1 TABLET: 8.6 TABLET, FILM COATED ORAL at 09:05

## 2022-06-05 RX ADMIN — OXYCODONE 5 MG: 5 TABLET ORAL at 14:40

## 2022-06-05 RX ADMIN — HYDRALAZINE HYDROCHLORIDE 100 MG: 50 TABLET, FILM COATED ORAL at 02:06

## 2022-06-05 RX ADMIN — AMLODIPINE BESYLATE 10 MG: 10 TABLET ORAL at 09:05

## 2022-06-05 RX ADMIN — ACETAMINOPHEN 500 MG: 500 TABLET, FILM COATED ORAL at 17:44

## 2022-06-05 RX ADMIN — INSULIN GLARGINE-YFGN 15 UNITS: 100 INJECTION, SOLUTION SUBCUTANEOUS at 09:07

## 2022-06-05 RX ADMIN — LIDOCAINE 1 PATCH: 50 PATCH CUTANEOUS at 09:07

## 2022-06-05 RX ADMIN — ATORVASTATIN CALCIUM 40 MG: 20 TABLET, FILM COATED ORAL at 09:05

## 2022-06-05 RX ADMIN — GABAPENTIN 200 MG: 100 CAPSULE ORAL at 19:43

## 2022-06-05 RX ADMIN — LOSARTAN POTASSIUM 25 MG: 25 TABLET, FILM COATED ORAL at 09:05

## 2022-06-05 RX ADMIN — SERTRALINE 100 MG: 100 TABLET, FILM COATED ORAL at 09:06

## 2022-06-05 RX ADMIN — INSULIN LISPRO 3 UNITS: 100 INJECTION, SOLUTION INTRAVENOUS; SUBCUTANEOUS at 16:42

## 2022-06-05 RX ADMIN — GABAPENTIN 200 MG: 100 CAPSULE ORAL at 16:42

## 2022-06-05 RX ADMIN — LEVOTHYROXINE SODIUM 125 MCG: 0.12 TABLET ORAL at 05:59

## 2022-06-05 RX ADMIN — TAMSULOSIN HYDROCHLORIDE 0.4 MG: 0.4 CAPSULE ORAL at 09:06

## 2022-06-05 RX ADMIN — OXYCODONE 5 MG: 5 TABLET ORAL at 02:06

## 2022-06-05 RX ADMIN — GABAPENTIN 200 MG: 100 CAPSULE ORAL at 09:05

## 2022-06-05 RX ADMIN — Medication 3 MG: at 21:17

## 2022-06-06 LAB
ALBUMIN SERPL-MCNC: 3 G/DL (ref 3.5–5.2)
ALBUMIN/GLOB SERPL: 1 G/DL
ALP SERPL-CCNC: 770 U/L (ref 39–117)
ALT SERPL W P-5'-P-CCNC: 70 U/L (ref 1–33)
ANION GAP SERPL CALCULATED.3IONS-SCNC: 12.7 MMOL/L (ref 5–15)
AST SERPL-CCNC: 87 U/L (ref 1–32)
BASOPHILS # BLD AUTO: 0.05 10*3/MM3 (ref 0–0.2)
BASOPHILS NFR BLD AUTO: 0.3 % (ref 0–1.5)
BILIRUB SERPL-MCNC: 0.9 MG/DL (ref 0–1.2)
BUN SERPL-MCNC: 39 MG/DL (ref 6–20)
BUN/CREAT SERPL: 10.3 (ref 7–25)
CALCIUM SPEC-SCNC: 8.3 MG/DL (ref 8.6–10.5)
CHLORIDE SERPL-SCNC: 94 MMOL/L (ref 98–107)
CK SERPL-CCNC: 90 U/L (ref 20–180)
CO2 SERPL-SCNC: 21.3 MMOL/L (ref 22–29)
CREAT SERPL-MCNC: 3.77 MG/DL (ref 0.57–1)
DEPRECATED RDW RBC AUTO: 52.5 FL (ref 37–54)
EGFRCR SERPLBLD CKD-EPI 2021: 13.5 ML/MIN/1.73
EOSINOPHIL # BLD AUTO: 0.05 10*3/MM3 (ref 0–0.4)
EOSINOPHIL NFR BLD AUTO: 0.3 % (ref 0.3–6.2)
ERYTHROCYTE [DISTWIDTH] IN BLOOD BY AUTOMATED COUNT: 16.7 % (ref 12.3–15.4)
GGT SERPL-CCNC: 599 U/L (ref 5–36)
GLOBULIN UR ELPH-MCNC: 3 GM/DL
GLUCOSE BLDC GLUCOMTR-MCNC: 142 MG/DL (ref 70–130)
GLUCOSE BLDC GLUCOMTR-MCNC: 193 MG/DL (ref 70–130)
GLUCOSE BLDC GLUCOMTR-MCNC: 285 MG/DL (ref 70–130)
GLUCOSE BLDC GLUCOMTR-MCNC: 336 MG/DL (ref 70–130)
GLUCOSE SERPL-MCNC: 227 MG/DL (ref 65–99)
HCT VFR BLD AUTO: 26.5 % (ref 34–46.6)
HGB BLD-MCNC: 8.1 G/DL (ref 12–15.9)
IMM GRANULOCYTES # BLD AUTO: 0.35 10*3/MM3 (ref 0–0.05)
IMM GRANULOCYTES NFR BLD AUTO: 2.4 % (ref 0–0.5)
LYMPHOCYTES # BLD AUTO: 0.83 10*3/MM3 (ref 0.7–3.1)
LYMPHOCYTES NFR BLD AUTO: 5.7 % (ref 19.6–45.3)
MCH RBC QN AUTO: 26.4 PG (ref 26.6–33)
MCHC RBC AUTO-ENTMCNC: 30.6 G/DL (ref 31.5–35.7)
MCV RBC AUTO: 86.3 FL (ref 79–97)
MONOCYTES # BLD AUTO: 1.09 10*3/MM3 (ref 0.1–0.9)
MONOCYTES NFR BLD AUTO: 7.5 % (ref 5–12)
NEUTROPHILS NFR BLD AUTO: 12.07 10*3/MM3 (ref 1.7–7)
NEUTROPHILS NFR BLD AUTO: 83.8 % (ref 42.7–76)
NRBC BLD AUTO-RTO: 0 /100 WBC (ref 0–0.2)
PHOSPHATE SERPL-MCNC: 4.3 MG/DL (ref 2.5–4.5)
PLATELET # BLD AUTO: 300 10*3/MM3 (ref 140–450)
PMV BLD AUTO: 10.4 FL (ref 6–12)
POTASSIUM SERPL-SCNC: 4.3 MMOL/L (ref 3.5–5.2)
PROT SERPL-MCNC: 6 G/DL (ref 6–8.5)
RBC # BLD AUTO: 3.07 10*6/MM3 (ref 3.77–5.28)
SODIUM SERPL-SCNC: 128 MMOL/L (ref 136–145)
WBC NRBC COR # BLD: 14.44 10*3/MM3 (ref 3.4–10.8)

## 2022-06-06 PROCEDURE — 87086 URINE CULTURE/COLONY COUNT: CPT | Performed by: PHYSICAL MEDICINE & REHABILITATION

## 2022-06-06 PROCEDURE — 97535 SELF CARE MNGMENT TRAINING: CPT

## 2022-06-06 PROCEDURE — 63710000001 INSULIN LISPRO (HUMAN) PER 5 UNITS: Performed by: PHYSICAL MEDICINE & REHABILITATION

## 2022-06-06 PROCEDURE — 87186 SC STD MICRODIL/AGAR DIL: CPT | Performed by: PHYSICAL MEDICINE & REHABILITATION

## 2022-06-06 PROCEDURE — 81001 URINALYSIS AUTO W/SCOPE: CPT | Performed by: PHYSICAL MEDICINE & REHABILITATION

## 2022-06-06 PROCEDURE — 97129 THER IVNTJ 1ST 15 MIN: CPT

## 2022-06-06 PROCEDURE — 82550 ASSAY OF CK (CPK): CPT | Performed by: PHYSICAL MEDICINE & REHABILITATION

## 2022-06-06 PROCEDURE — 99232 SBSQ HOSP IP/OBS MODERATE 35: CPT | Performed by: INTERNAL MEDICINE

## 2022-06-06 PROCEDURE — 97110 THERAPEUTIC EXERCISES: CPT

## 2022-06-06 PROCEDURE — 85025 COMPLETE CBC W/AUTO DIFF WBC: CPT | Performed by: PHYSICAL MEDICINE & REHABILITATION

## 2022-06-06 PROCEDURE — 25010000002 HEPARIN (PORCINE) PER 1000 UNITS: Performed by: STUDENT IN AN ORGANIZED HEALTH CARE EDUCATION/TRAINING PROGRAM

## 2022-06-06 PROCEDURE — 87088 URINE BACTERIA CULTURE: CPT | Performed by: PHYSICAL MEDICINE & REHABILITATION

## 2022-06-06 PROCEDURE — 80053 COMPREHEN METABOLIC PANEL: CPT | Performed by: INTERNAL MEDICINE

## 2022-06-06 PROCEDURE — 97130 THER IVNTJ EA ADDL 15 MIN: CPT

## 2022-06-06 PROCEDURE — 84100 ASSAY OF PHOSPHORUS: CPT | Performed by: PHYSICAL MEDICINE & REHABILITATION

## 2022-06-06 PROCEDURE — 82962 GLUCOSE BLOOD TEST: CPT

## 2022-06-06 PROCEDURE — 82977 ASSAY OF GGT: CPT | Performed by: PHYSICAL MEDICINE & REHABILITATION

## 2022-06-06 RX ADMIN — GABAPENTIN 200 MG: 100 CAPSULE ORAL at 09:26

## 2022-06-06 RX ADMIN — SENNOSIDES 1 TABLET: 8.6 TABLET, FILM COATED ORAL at 21:35

## 2022-06-06 RX ADMIN — Medication 1 TABLET: at 09:26

## 2022-06-06 RX ADMIN — INSULIN GLARGINE-YFGN 15 UNITS: 100 INJECTION, SOLUTION SUBCUTANEOUS at 09:26

## 2022-06-06 RX ADMIN — ROPINIROLE HYDROCHLORIDE 0.75 MG: 0.5 TABLET, FILM COATED ORAL at 21:35

## 2022-06-06 RX ADMIN — LIDOCAINE 1 PATCH: 50 PATCH CUTANEOUS at 09:26

## 2022-06-06 RX ADMIN — HYDRALAZINE HYDROCHLORIDE 100 MG: 50 TABLET, FILM COATED ORAL at 19:00

## 2022-06-06 RX ADMIN — ATORVASTATIN CALCIUM 40 MG: 20 TABLET, FILM COATED ORAL at 09:26

## 2022-06-06 RX ADMIN — GABAPENTIN 200 MG: 100 CAPSULE ORAL at 21:35

## 2022-06-06 RX ADMIN — OXYCODONE 5 MG: 5 TABLET ORAL at 21:35

## 2022-06-06 RX ADMIN — LOSARTAN POTASSIUM 25 MG: 25 TABLET, FILM COATED ORAL at 09:26

## 2022-06-06 RX ADMIN — OXYCODONE 5 MG: 5 TABLET ORAL at 04:54

## 2022-06-06 RX ADMIN — PANTOPRAZOLE SODIUM 40 MG: 40 TABLET, DELAYED RELEASE ORAL at 04:54

## 2022-06-06 RX ADMIN — OXYCODONE 5 MG: 5 TABLET ORAL at 16:38

## 2022-06-06 RX ADMIN — HEPARIN SODIUM 3800 UNITS: 1000 INJECTION INTRAVENOUS; SUBCUTANEOUS at 16:53

## 2022-06-06 RX ADMIN — OXYCODONE 5 MG: 5 TABLET ORAL at 10:14

## 2022-06-06 RX ADMIN — TAMSULOSIN HYDROCHLORIDE 0.4 MG: 0.4 CAPSULE ORAL at 09:26

## 2022-06-06 RX ADMIN — Medication 3 MG: at 21:35

## 2022-06-06 RX ADMIN — GABAPENTIN 200 MG: 100 CAPSULE ORAL at 16:38

## 2022-06-06 RX ADMIN — INSULIN GLARGINE-YFGN 5 UNITS: 100 INJECTION, SOLUTION SUBCUTANEOUS at 21:36

## 2022-06-06 RX ADMIN — CARVEDILOL 12.5 MG: 12.5 TABLET, FILM COATED ORAL at 19:00

## 2022-06-06 RX ADMIN — INSULIN LISPRO 5 UNITS: 100 INJECTION, SOLUTION INTRAVENOUS; SUBCUTANEOUS at 09:26

## 2022-06-06 RX ADMIN — HYDRALAZINE HYDROCHLORIDE 100 MG: 50 TABLET, FILM COATED ORAL at 02:32

## 2022-06-06 RX ADMIN — INSULIN LISPRO 4 UNITS: 100 INJECTION, SOLUTION INTRAVENOUS; SUBCUTANEOUS at 11:57

## 2022-06-06 RX ADMIN — SENNOSIDES 1 TABLET: 8.6 TABLET, FILM COATED ORAL at 09:26

## 2022-06-06 RX ADMIN — SERTRALINE 100 MG: 100 TABLET, FILM COATED ORAL at 09:26

## 2022-06-06 RX ADMIN — ACETAMINOPHEN 500 MG: 500 TABLET, FILM COATED ORAL at 09:26

## 2022-06-06 RX ADMIN — ASPIRIN 81 MG: 81 TABLET, COATED ORAL at 09:26

## 2022-06-06 RX ADMIN — LEVOTHYROXINE SODIUM 125 MCG: 0.12 TABLET ORAL at 04:54

## 2022-06-07 ENCOUNTER — APPOINTMENT (OUTPATIENT)
Dept: ULTRASOUND IMAGING | Facility: HOSPITAL | Age: 57
End: 2022-06-07

## 2022-06-07 LAB
ALBUMIN SERPL-MCNC: 2.9 G/DL (ref 3.5–5.2)
ALP SERPL-CCNC: 774 U/L (ref 39–117)
ALT SERPL W P-5'-P-CCNC: 61 U/L (ref 1–33)
ANION GAP SERPL CALCULATED.3IONS-SCNC: 10 MMOL/L (ref 5–15)
AST SERPL-CCNC: 87 U/L (ref 1–32)
BACTERIA UR QL AUTO: ABNORMAL /HPF
BASOPHILS # BLD AUTO: 0.02 10*3/MM3 (ref 0–0.2)
BASOPHILS NFR BLD AUTO: 0.2 % (ref 0–1.5)
BILIRUB CONJ SERPL-MCNC: 1 MG/DL (ref 0–0.3)
BILIRUB INDIRECT SERPL-MCNC: 0.2 MG/DL
BILIRUB SERPL-MCNC: 1.2 MG/DL (ref 0–1.2)
BILIRUB UR QL STRIP: NEGATIVE
BUN SERPL-MCNC: 22 MG/DL (ref 6–20)
BUN/CREAT SERPL: 8.4 (ref 7–25)
CALCIUM SPEC-SCNC: 8 MG/DL (ref 8.6–10.5)
CHLORIDE SERPL-SCNC: 99 MMOL/L (ref 98–107)
CLARITY UR: CLEAR
CO2 SERPL-SCNC: 24 MMOL/L (ref 22–29)
COLOR UR: ABNORMAL
CREAT SERPL-MCNC: 2.63 MG/DL (ref 0.57–1)
DEPRECATED RDW RBC AUTO: 51.9 FL (ref 37–54)
EGFRCR SERPLBLD CKD-EPI 2021: 20.8 ML/MIN/1.73
EOSINOPHIL # BLD AUTO: 0.07 10*3/MM3 (ref 0–0.4)
EOSINOPHIL NFR BLD AUTO: 0.5 % (ref 0.3–6.2)
ERYTHROCYTE [DISTWIDTH] IN BLOOD BY AUTOMATED COUNT: 17 % (ref 12.3–15.4)
GGT SERPL-CCNC: 612 U/L (ref 5–36)
GLUCOSE BLDC GLUCOMTR-MCNC: 129 MG/DL (ref 70–130)
GLUCOSE BLDC GLUCOMTR-MCNC: 156 MG/DL (ref 70–130)
GLUCOSE BLDC GLUCOMTR-MCNC: 43 MG/DL (ref 70–130)
GLUCOSE BLDC GLUCOMTR-MCNC: 56 MG/DL (ref 70–130)
GLUCOSE BLDC GLUCOMTR-MCNC: 79 MG/DL (ref 70–130)
GLUCOSE BLDC GLUCOMTR-MCNC: 86 MG/DL (ref 70–130)
GLUCOSE SERPL-MCNC: 71 MG/DL (ref 65–99)
GLUCOSE UR STRIP-MCNC: NEGATIVE MG/DL
HCT VFR BLD AUTO: 25 % (ref 34–46.6)
HGB BLD-MCNC: 7.8 G/DL (ref 12–15.9)
HGB UR QL STRIP.AUTO: ABNORMAL
HYALINE CASTS UR QL AUTO: ABNORMAL /LPF
IMM GRANULOCYTES # BLD AUTO: 0.18 10*3/MM3 (ref 0–0.05)
IMM GRANULOCYTES NFR BLD AUTO: 1.4 % (ref 0–0.5)
KETONES UR QL STRIP: NEGATIVE
LEUKOCYTE ESTERASE UR QL STRIP.AUTO: ABNORMAL
LYMPHOCYTES # BLD AUTO: 0.93 10*3/MM3 (ref 0.7–3.1)
LYMPHOCYTES NFR BLD AUTO: 7.2 % (ref 19.6–45.3)
MCH RBC QN AUTO: 26.4 PG (ref 26.6–33)
MCHC RBC AUTO-ENTMCNC: 31.2 G/DL (ref 31.5–35.7)
MCV RBC AUTO: 84.5 FL (ref 79–97)
MONOCYTES # BLD AUTO: 1.08 10*3/MM3 (ref 0.1–0.9)
MONOCYTES NFR BLD AUTO: 8.3 % (ref 5–12)
NEUTROPHILS NFR BLD AUTO: 10.68 10*3/MM3 (ref 1.7–7)
NEUTROPHILS NFR BLD AUTO: 82.4 % (ref 42.7–76)
NITRITE UR QL STRIP: NEGATIVE
NRBC BLD AUTO-RTO: 0 /100 WBC (ref 0–0.2)
PH UR STRIP.AUTO: 6.5 [PH] (ref 5–8)
PHOSPHATE SERPL-MCNC: 3 MG/DL (ref 2.5–4.5)
PLATELET # BLD AUTO: 277 10*3/MM3 (ref 140–450)
PMV BLD AUTO: 10.1 FL (ref 6–12)
POTASSIUM SERPL-SCNC: 3.9 MMOL/L (ref 3.5–5.2)
PROT SERPL-MCNC: 5.9 G/DL (ref 6–8.5)
PROT UR QL STRIP: ABNORMAL
RBC # BLD AUTO: 2.96 10*6/MM3 (ref 3.77–5.28)
RBC # UR STRIP: ABNORMAL /HPF
REF LAB TEST METHOD: ABNORMAL
SODIUM SERPL-SCNC: 133 MMOL/L (ref 136–145)
SP GR UR STRIP: 1.01 (ref 1–1.03)
SQUAMOUS #/AREA URNS HPF: ABNORMAL /HPF
UROBILINOGEN UR QL STRIP: ABNORMAL
WBC # UR STRIP: ABNORMAL /HPF
WBC NRBC COR # BLD: 12.96 10*3/MM3 (ref 3.4–10.8)
YEAST URNS QL MICRO: ABNORMAL /HPF

## 2022-06-07 PROCEDURE — 80076 HEPATIC FUNCTION PANEL: CPT | Performed by: PHYSICAL MEDICINE & REHABILITATION

## 2022-06-07 PROCEDURE — 80048 BASIC METABOLIC PNL TOTAL CA: CPT | Performed by: PHYSICAL MEDICINE & REHABILITATION

## 2022-06-07 PROCEDURE — 84075 ASSAY ALKALINE PHOSPHATASE: CPT | Performed by: INTERNAL MEDICINE

## 2022-06-07 PROCEDURE — 76705 ECHO EXAM OF ABDOMEN: CPT

## 2022-06-07 PROCEDURE — 85025 COMPLETE CBC W/AUTO DIFF WBC: CPT | Performed by: PHYSICAL MEDICINE & REHABILITATION

## 2022-06-07 PROCEDURE — 82962 GLUCOSE BLOOD TEST: CPT

## 2022-06-07 PROCEDURE — 97530 THERAPEUTIC ACTIVITIES: CPT | Performed by: OCCUPATIONAL THERAPIST

## 2022-06-07 PROCEDURE — 84100 ASSAY OF PHOSPHORUS: CPT | Performed by: PHYSICAL MEDICINE & REHABILITATION

## 2022-06-07 PROCEDURE — 92507 TX SP LANG VOICE COMM INDIV: CPT

## 2022-06-07 PROCEDURE — 97535 SELF CARE MNGMENT TRAINING: CPT | Performed by: OCCUPATIONAL THERAPIST

## 2022-06-07 PROCEDURE — 84080 ASSAY ALKALINE PHOSPHATASES: CPT | Performed by: INTERNAL MEDICINE

## 2022-06-07 PROCEDURE — 86381 MITOCHONDRIAL ANTIBODY EACH: CPT | Performed by: INTERNAL MEDICINE

## 2022-06-07 PROCEDURE — 99222 1ST HOSP IP/OBS MODERATE 55: CPT | Performed by: INTERNAL MEDICINE

## 2022-06-07 PROCEDURE — 82977 ASSAY OF GGT: CPT | Performed by: PHYSICAL MEDICINE & REHABILITATION

## 2022-06-07 RX ORDER — GABAPENTIN 100 MG/1
200 CAPSULE ORAL 2 TIMES DAILY
Status: DISCONTINUED | OUTPATIENT
Start: 2022-06-07 | End: 2022-06-14

## 2022-06-07 RX ORDER — OXYCODONE HYDROCHLORIDE 5 MG/1
2.5 TABLET ORAL EVERY 4 HOURS PRN
Status: ACTIVE | OUTPATIENT
Start: 2022-06-07 | End: 2022-06-07

## 2022-06-07 RX ORDER — CEFDINIR 300 MG/1
300 CAPSULE ORAL
Status: COMPLETED | OUTPATIENT
Start: 2022-06-07 | End: 2022-06-13

## 2022-06-07 RX ORDER — LOSARTAN POTASSIUM 100 MG/1
100 TABLET ORAL
Status: DISCONTINUED | OUTPATIENT
Start: 2022-06-07 | End: 2022-07-22 | Stop reason: HOSPADM

## 2022-06-07 RX ORDER — OXYCODONE HYDROCHLORIDE 5 MG/1
2.5 TABLET ORAL EVERY 4 HOURS PRN
Status: DISCONTINUED | OUTPATIENT
Start: 2022-06-07 | End: 2022-06-14

## 2022-06-07 RX ADMIN — HYDRALAZINE HYDROCHLORIDE 100 MG: 50 TABLET, FILM COATED ORAL at 10:28

## 2022-06-07 RX ADMIN — OXYCODONE 5 MG: 5 TABLET ORAL at 02:03

## 2022-06-07 RX ADMIN — LEVOTHYROXINE SODIUM 125 MCG: 0.12 TABLET ORAL at 06:14

## 2022-06-07 RX ADMIN — HYDRALAZINE HYDROCHLORIDE 100 MG: 50 TABLET, FILM COATED ORAL at 02:03

## 2022-06-07 RX ADMIN — SENNOSIDES 1 TABLET: 8.6 TABLET, FILM COATED ORAL at 07:34

## 2022-06-07 RX ADMIN — ACETAMINOPHEN 500 MG: 500 TABLET, FILM COATED ORAL at 13:02

## 2022-06-07 RX ADMIN — CARVEDILOL 12.5 MG: 12.5 TABLET, FILM COATED ORAL at 07:35

## 2022-06-07 RX ADMIN — LOSARTAN POTASSIUM 25 MG: 25 TABLET, FILM COATED ORAL at 07:35

## 2022-06-07 RX ADMIN — OXYCODONE 2.5 MG: 5 TABLET ORAL at 17:22

## 2022-06-07 RX ADMIN — INSULIN GLARGINE-YFGN 15 UNITS: 100 INJECTION, SOLUTION SUBCUTANEOUS at 07:34

## 2022-06-07 RX ADMIN — HYDRALAZINE HYDROCHLORIDE 10 MG: 10 TABLET, FILM COATED ORAL at 03:52

## 2022-06-07 RX ADMIN — OXYCODONE 5 MG: 5 TABLET ORAL at 06:14

## 2022-06-07 RX ADMIN — HYDRALAZINE HYDROCHLORIDE 100 MG: 50 TABLET, FILM COATED ORAL at 17:08

## 2022-06-07 RX ADMIN — SERTRALINE 100 MG: 100 TABLET, FILM COATED ORAL at 07:35

## 2022-06-07 RX ADMIN — GABAPENTIN 200 MG: 100 CAPSULE ORAL at 07:34

## 2022-06-07 RX ADMIN — AMLODIPINE BESYLATE 10 MG: 10 TABLET ORAL at 07:35

## 2022-06-07 RX ADMIN — LIDOCAINE 1 PATCH: 50 PATCH CUTANEOUS at 07:35

## 2022-06-07 RX ADMIN — PANTOPRAZOLE SODIUM 40 MG: 40 TABLET, DELAYED RELEASE ORAL at 06:14

## 2022-06-07 RX ADMIN — Medication 3 MG: at 21:52

## 2022-06-07 RX ADMIN — ATORVASTATIN CALCIUM 40 MG: 20 TABLET, FILM COATED ORAL at 07:34

## 2022-06-07 RX ADMIN — GABAPENTIN 200 MG: 100 CAPSULE ORAL at 21:52

## 2022-06-07 RX ADMIN — ASPIRIN 81 MG: 81 TABLET, COATED ORAL at 07:34

## 2022-06-07 RX ADMIN — ROPINIROLE HYDROCHLORIDE 0.75 MG: 0.5 TABLET, FILM COATED ORAL at 21:52

## 2022-06-07 RX ADMIN — OXYCODONE 5 MG: 5 TABLET ORAL at 10:28

## 2022-06-07 RX ADMIN — TAMSULOSIN HYDROCHLORIDE 0.4 MG: 0.4 CAPSULE ORAL at 07:35

## 2022-06-07 RX ADMIN — CEFDINIR 300 MG: 300 CAPSULE ORAL at 12:00

## 2022-06-07 RX ADMIN — LOSARTAN POTASSIUM 100 MG: 100 TABLET, FILM COATED ORAL at 10:29

## 2022-06-07 RX ADMIN — ACETAMINOPHEN 500 MG: 500 TABLET, FILM COATED ORAL at 07:34

## 2022-06-07 RX ADMIN — OXYCODONE 2.5 MG: 5 TABLET ORAL at 21:53

## 2022-06-07 RX ADMIN — Medication 1 TABLET: at 07:35

## 2022-06-07 RX ADMIN — DIPHENOXYLATE HYDROCHLORIDE AND ATROPINE SULFATE 1 TABLET: 2.5; .025 TABLET ORAL at 18:23

## 2022-06-08 ENCOUNTER — APPOINTMENT (OUTPATIENT)
Dept: CT IMAGING | Facility: HOSPITAL | Age: 57
End: 2022-06-08

## 2022-06-08 LAB
GLUCOSE BLDC GLUCOMTR-MCNC: 113 MG/DL (ref 70–130)
GLUCOSE BLDC GLUCOMTR-MCNC: 200 MG/DL (ref 70–130)
GLUCOSE BLDC GLUCOMTR-MCNC: 214 MG/DL (ref 70–130)
GLUCOSE BLDC GLUCOMTR-MCNC: 73 MG/DL (ref 70–130)
GLUCOSE BLDC GLUCOMTR-MCNC: 84 MG/DL (ref 70–130)
MITOCHONDRIA M2 IGG SER-ACNC: <20 UNITS (ref 0–20)

## 2022-06-08 PROCEDURE — 97535 SELF CARE MNGMENT TRAINING: CPT

## 2022-06-08 PROCEDURE — 25010000002 EPOETIN ALFA-EPBX 10000 UNIT/ML SOLUTION: Performed by: INTERNAL MEDICINE

## 2022-06-08 PROCEDURE — 82962 GLUCOSE BLOOD TEST: CPT

## 2022-06-08 PROCEDURE — 97110 THERAPEUTIC EXERCISES: CPT

## 2022-06-08 PROCEDURE — 97130 THER IVNTJ EA ADDL 15 MIN: CPT

## 2022-06-08 PROCEDURE — 99231 SBSQ HOSP IP/OBS SF/LOW 25: CPT | Performed by: PHYSICIAN ASSISTANT

## 2022-06-08 PROCEDURE — 97129 THER IVNTJ 1ST 15 MIN: CPT

## 2022-06-08 PROCEDURE — 74176 CT ABD & PELVIS W/O CONTRAST: CPT

## 2022-06-08 PROCEDURE — 63710000001 INSULIN LISPRO (HUMAN) PER 5 UNITS: Performed by: PHYSICAL MEDICINE & REHABILITATION

## 2022-06-08 PROCEDURE — 99222 1ST HOSP IP/OBS MODERATE 55: CPT | Performed by: PSYCHIATRY & NEUROLOGY

## 2022-06-08 RX ADMIN — DIPHENOXYLATE HYDROCHLORIDE AND ATROPINE SULFATE 1 TABLET: 2.5; .025 TABLET ORAL at 21:05

## 2022-06-08 RX ADMIN — ACETAMINOPHEN 500 MG: 500 TABLET, FILM COATED ORAL at 22:03

## 2022-06-08 RX ADMIN — PANTOPRAZOLE SODIUM 40 MG: 40 TABLET, DELAYED RELEASE ORAL at 06:06

## 2022-06-08 RX ADMIN — EPOETIN ALFA-EPBX 10000 UNITS: 10000 INJECTION, SOLUTION INTRAVENOUS; SUBCUTANEOUS at 18:07

## 2022-06-08 RX ADMIN — OXYCODONE 2.5 MG: 5 TABLET ORAL at 06:38

## 2022-06-08 RX ADMIN — LOSARTAN POTASSIUM 100 MG: 100 TABLET, FILM COATED ORAL at 09:29

## 2022-06-08 RX ADMIN — Medication 3 MG: at 22:03

## 2022-06-08 RX ADMIN — Medication 1 TABLET: at 09:27

## 2022-06-08 RX ADMIN — INSULIN LISPRO 3 UNITS: 100 INJECTION, SOLUTION INTRAVENOUS; SUBCUTANEOUS at 12:20

## 2022-06-08 RX ADMIN — OXYCODONE 2.5 MG: 5 TABLET ORAL at 02:20

## 2022-06-08 RX ADMIN — ATORVASTATIN CALCIUM 40 MG: 20 TABLET, FILM COATED ORAL at 09:27

## 2022-06-08 RX ADMIN — OXYCODONE 2.5 MG: 5 TABLET ORAL at 12:37

## 2022-06-08 RX ADMIN — CARVEDILOL 12.5 MG: 12.5 TABLET, FILM COATED ORAL at 19:12

## 2022-06-08 RX ADMIN — HYDRALAZINE HYDROCHLORIDE 100 MG: 50 TABLET, FILM COATED ORAL at 19:12

## 2022-06-08 RX ADMIN — LIDOCAINE 1 PATCH: 50 PATCH CUTANEOUS at 09:29

## 2022-06-08 RX ADMIN — LEVOTHYROXINE SODIUM 125 MCG: 0.12 TABLET ORAL at 06:06

## 2022-06-08 RX ADMIN — CEFDINIR 300 MG: 300 CAPSULE ORAL at 09:28

## 2022-06-08 RX ADMIN — SERTRALINE 100 MG: 100 TABLET, FILM COATED ORAL at 09:30

## 2022-06-08 RX ADMIN — GABAPENTIN 200 MG: 100 CAPSULE ORAL at 21:05

## 2022-06-08 RX ADMIN — ROPINIROLE HYDROCHLORIDE 0.75 MG: 0.5 TABLET, FILM COATED ORAL at 21:05

## 2022-06-08 RX ADMIN — HYDRALAZINE HYDROCHLORIDE 100 MG: 50 TABLET, FILM COATED ORAL at 02:19

## 2022-06-08 RX ADMIN — TAMSULOSIN HYDROCHLORIDE 0.4 MG: 0.4 CAPSULE ORAL at 09:30

## 2022-06-08 RX ADMIN — OXYCODONE 2.5 MG: 5 TABLET ORAL at 19:12

## 2022-06-08 RX ADMIN — GABAPENTIN 200 MG: 100 CAPSULE ORAL at 09:28

## 2022-06-08 RX ADMIN — ASPIRIN 81 MG: 81 TABLET, COATED ORAL at 09:27

## 2022-06-08 RX ADMIN — SENNOSIDES 1 TABLET: 8.6 TABLET, FILM COATED ORAL at 09:29

## 2022-06-09 ENCOUNTER — APPOINTMENT (OUTPATIENT)
Dept: ULTRASOUND IMAGING | Facility: HOSPITAL | Age: 57
End: 2022-06-09

## 2022-06-09 LAB
ALBUMIN SERPL-MCNC: 2.7 G/DL (ref 3.5–5.2)
ALBUMIN/GLOB SERPL: 0.8 G/DL
ALP BONE CFR SERPL: 31 % (ref 14–68)
ALP INTEST CFR SERPL: 7 % (ref 0–18)
ALP LIVER CFR SERPL: 62 % (ref 18–85)
ALP SERPL-CCNC: 812 U/L (ref 39–117)
ALP SERPL-CCNC: 822 IU/L (ref 44–121)
ALT SERPL W P-5'-P-CCNC: 42 U/L (ref 1–33)
ANION GAP SERPL CALCULATED.3IONS-SCNC: 12 MMOL/L (ref 5–15)
AST SERPL-CCNC: 58 U/L (ref 1–32)
BACTERIA SPEC AEROBE CULT: ABNORMAL
BASOPHILS # BLD AUTO: 0.03 10*3/MM3 (ref 0–0.2)
BASOPHILS NFR BLD AUTO: 0.3 % (ref 0–1.5)
BILIRUB SERPL-MCNC: 1.5 MG/DL (ref 0–1.2)
BUN SERPL-MCNC: 20 MG/DL (ref 6–20)
BUN/CREAT SERPL: 8.2 (ref 7–25)
CALCIUM SPEC-SCNC: 8 MG/DL (ref 8.6–10.5)
CHLORIDE SERPL-SCNC: 95 MMOL/L (ref 98–107)
CO2 SERPL-SCNC: 21 MMOL/L (ref 22–29)
CREAT SERPL-MCNC: 2.45 MG/DL (ref 0.57–1)
DEPRECATED RDW RBC AUTO: 50.1 FL (ref 37–54)
EGFRCR SERPLBLD CKD-EPI 2021: 22.6 ML/MIN/1.73
EOSINOPHIL # BLD AUTO: 0.06 10*3/MM3 (ref 0–0.4)
EOSINOPHIL NFR BLD AUTO: 0.5 % (ref 0.3–6.2)
ERYTHROCYTE [DISTWIDTH] IN BLOOD BY AUTOMATED COUNT: 16.6 % (ref 12.3–15.4)
GLOBULIN UR ELPH-MCNC: 3.4 GM/DL
GLUCOSE BLDC GLUCOMTR-MCNC: 140 MG/DL (ref 70–130)
GLUCOSE BLDC GLUCOMTR-MCNC: 217 MG/DL (ref 70–130)
GLUCOSE BLDC GLUCOMTR-MCNC: 222 MG/DL (ref 70–130)
GLUCOSE BLDC GLUCOMTR-MCNC: 241 MG/DL (ref 70–130)
GLUCOSE BLDC GLUCOMTR-MCNC: 352 MG/DL (ref 70–130)
GLUCOSE BLDC GLUCOMTR-MCNC: 388 MG/DL (ref 70–130)
GLUCOSE SERPL-MCNC: 250 MG/DL (ref 65–99)
HCT VFR BLD AUTO: 24.8 % (ref 34–46.6)
HGB BLD-MCNC: 7.6 G/DL (ref 12–15.9)
IMM GRANULOCYTES # BLD AUTO: 0.12 10*3/MM3 (ref 0–0.05)
IMM GRANULOCYTES NFR BLD AUTO: 1 % (ref 0–0.5)
LYMPHOCYTES # BLD AUTO: 0.69 10*3/MM3 (ref 0.7–3.1)
LYMPHOCYTES NFR BLD AUTO: 5.8 % (ref 19.6–45.3)
MCH RBC QN AUTO: 25.9 PG (ref 26.6–33)
MCHC RBC AUTO-ENTMCNC: 30.6 G/DL (ref 31.5–35.7)
MCV RBC AUTO: 84.4 FL (ref 79–97)
MONOCYTES # BLD AUTO: 1.24 10*3/MM3 (ref 0.1–0.9)
MONOCYTES NFR BLD AUTO: 10.4 % (ref 5–12)
NEUTROPHILS NFR BLD AUTO: 82 % (ref 42.7–76)
NEUTROPHILS NFR BLD AUTO: 9.76 10*3/MM3 (ref 1.7–7)
NRBC BLD AUTO-RTO: 0 /100 WBC (ref 0–0.2)
PHOSPHATE SERPL-MCNC: 2.7 MG/DL (ref 2.5–4.5)
PLATELET # BLD AUTO: 226 10*3/MM3 (ref 140–450)
PMV BLD AUTO: 10.4 FL (ref 6–12)
POTASSIUM SERPL-SCNC: 3.9 MMOL/L (ref 3.5–5.2)
PROT SERPL-MCNC: 6.1 G/DL (ref 6–8.5)
RBC # BLD AUTO: 2.94 10*6/MM3 (ref 3.77–5.28)
SODIUM SERPL-SCNC: 128 MMOL/L (ref 136–145)
TSH SERPL DL<=0.05 MIU/L-ACNC: 54.4 UIU/ML (ref 0.27–4.2)
WBC NRBC COR # BLD: 11.9 10*3/MM3 (ref 3.4–10.8)

## 2022-06-09 PROCEDURE — 99232 SBSQ HOSP IP/OBS MODERATE 35: CPT | Performed by: PHYSICIAN ASSISTANT

## 2022-06-09 PROCEDURE — 97130 THER IVNTJ EA ADDL 15 MIN: CPT

## 2022-06-09 PROCEDURE — 95886 MUSC TEST DONE W/N TEST COMP: CPT | Performed by: PSYCHIATRY & NEUROLOGY

## 2022-06-09 PROCEDURE — 95909 NRV CNDJ TST 5-6 STUDIES: CPT

## 2022-06-09 PROCEDURE — 97110 THERAPEUTIC EXERCISES: CPT

## 2022-06-09 PROCEDURE — 84443 ASSAY THYROID STIM HORMONE: CPT | Performed by: PHYSICAL MEDICINE & REHABILITATION

## 2022-06-09 PROCEDURE — 95886 MUSC TEST DONE W/N TEST COMP: CPT

## 2022-06-09 PROCEDURE — 80053 COMPREHEN METABOLIC PANEL: CPT | Performed by: PHYSICIAN ASSISTANT

## 2022-06-09 PROCEDURE — 85025 COMPLETE CBC W/AUTO DIFF WBC: CPT | Performed by: PHYSICAL MEDICINE & REHABILITATION

## 2022-06-09 PROCEDURE — 82962 GLUCOSE BLOOD TEST: CPT

## 2022-06-09 PROCEDURE — 95909 NRV CNDJ TST 5-6 STUDIES: CPT | Performed by: PSYCHIATRY & NEUROLOGY

## 2022-06-09 PROCEDURE — 97535 SELF CARE MNGMENT TRAINING: CPT

## 2022-06-09 PROCEDURE — 84100 ASSAY OF PHOSPHORUS: CPT | Performed by: PHYSICAL MEDICINE & REHABILITATION

## 2022-06-09 PROCEDURE — 97129 THER IVNTJ 1ST 15 MIN: CPT

## 2022-06-09 PROCEDURE — 63710000001 INSULIN LISPRO (HUMAN) PER 5 UNITS: Performed by: PHYSICAL MEDICINE & REHABILITATION

## 2022-06-09 RX ORDER — ALBUMIN (HUMAN) 12.5 G/50ML
12.5 SOLUTION INTRAVENOUS ONCE
Status: CANCELLED | OUTPATIENT
Start: 2022-06-09 | End: 2022-06-09

## 2022-06-09 RX ORDER — AMOXICILLIN 250 MG
1 CAPSULE ORAL 2 TIMES DAILY
Status: DISCONTINUED | OUTPATIENT
Start: 2022-06-09 | End: 2022-07-01

## 2022-06-09 RX ADMIN — ROPINIROLE HYDROCHLORIDE 0.75 MG: 0.5 TABLET, FILM COATED ORAL at 21:58

## 2022-06-09 RX ADMIN — INSULIN LISPRO 3 UNITS: 100 INJECTION, SOLUTION INTRAVENOUS; SUBCUTANEOUS at 18:00

## 2022-06-09 RX ADMIN — OXYCODONE 2.5 MG: 5 TABLET ORAL at 05:32

## 2022-06-09 RX ADMIN — DOCUSATE SODIUM 50MG AND SENNOSIDES 8.6MG 1 TABLET: 8.6; 5 TABLET, FILM COATED ORAL at 11:15

## 2022-06-09 RX ADMIN — OXYCODONE 2.5 MG: 5 TABLET ORAL at 21:58

## 2022-06-09 RX ADMIN — GABAPENTIN 200 MG: 100 CAPSULE ORAL at 21:58

## 2022-06-09 RX ADMIN — OXYCODONE 2.5 MG: 5 TABLET ORAL at 09:03

## 2022-06-09 RX ADMIN — SENNOSIDES 1 TABLET: 8.6 TABLET, FILM COATED ORAL at 07:05

## 2022-06-09 RX ADMIN — PANTOPRAZOLE SODIUM 40 MG: 40 TABLET, DELAYED RELEASE ORAL at 05:33

## 2022-06-09 RX ADMIN — OXYCODONE 2.5 MG: 5 TABLET ORAL at 14:10

## 2022-06-09 RX ADMIN — LIDOCAINE 1 PATCH: 50 PATCH CUTANEOUS at 07:08

## 2022-06-09 RX ADMIN — INSULIN LISPRO 3 UNITS: 100 INJECTION, SOLUTION INTRAVENOUS; SUBCUTANEOUS at 11:27

## 2022-06-09 RX ADMIN — CEFDINIR 300 MG: 300 CAPSULE ORAL at 07:04

## 2022-06-09 RX ADMIN — GABAPENTIN 200 MG: 100 CAPSULE ORAL at 07:06

## 2022-06-09 RX ADMIN — AMLODIPINE BESYLATE 10 MG: 10 TABLET ORAL at 05:32

## 2022-06-09 RX ADMIN — SERTRALINE 100 MG: 100 TABLET, FILM COATED ORAL at 07:04

## 2022-06-09 RX ADMIN — ASPIRIN 81 MG: 81 TABLET, COATED ORAL at 07:06

## 2022-06-09 RX ADMIN — LOSARTAN POTASSIUM 100 MG: 100 TABLET, FILM COATED ORAL at 05:32

## 2022-06-09 RX ADMIN — OXYCODONE 2.5 MG: 5 TABLET ORAL at 00:07

## 2022-06-09 RX ADMIN — DOCUSATE SODIUM 50MG AND SENNOSIDES 8.6MG 1 TABLET: 8.6; 5 TABLET, FILM COATED ORAL at 21:59

## 2022-06-09 RX ADMIN — ACETAMINOPHEN 500 MG: 500 TABLET, FILM COATED ORAL at 21:58

## 2022-06-09 RX ADMIN — HYDRALAZINE HYDROCHLORIDE 100 MG: 50 TABLET, FILM COATED ORAL at 10:05

## 2022-06-09 RX ADMIN — INSULIN GLARGINE-YFGN 12 UNITS: 100 INJECTION, SOLUTION SUBCUTANEOUS at 12:10

## 2022-06-09 RX ADMIN — CARVEDILOL 12.5 MG: 12.5 TABLET, FILM COATED ORAL at 07:05

## 2022-06-09 RX ADMIN — OXYCODONE 2.5 MG: 5 TABLET ORAL at 18:03

## 2022-06-09 RX ADMIN — Medication 3 MG: at 21:58

## 2022-06-09 RX ADMIN — ATORVASTATIN CALCIUM 40 MG: 20 TABLET, FILM COATED ORAL at 07:06

## 2022-06-09 RX ADMIN — LEVOTHYROXINE SODIUM 125 MCG: 0.12 TABLET ORAL at 05:32

## 2022-06-09 RX ADMIN — ACETAMINOPHEN 500 MG: 500 TABLET, FILM COATED ORAL at 09:02

## 2022-06-09 RX ADMIN — TAMSULOSIN HYDROCHLORIDE 0.4 MG: 0.4 CAPSULE ORAL at 07:04

## 2022-06-09 RX ADMIN — INSULIN LISPRO 6 UNITS: 100 INJECTION, SOLUTION INTRAVENOUS; SUBCUTANEOUS at 07:06

## 2022-06-09 RX ADMIN — Medication 1 TABLET: at 07:05

## 2022-06-09 RX ADMIN — HYDRALAZINE HYDROCHLORIDE 100 MG: 50 TABLET, FILM COATED ORAL at 18:01

## 2022-06-09 RX ADMIN — HYDRALAZINE HYDROCHLORIDE 100 MG: 50 TABLET, FILM COATED ORAL at 02:13

## 2022-06-10 PROBLEM — Z79.4 TYPE 2 DIABETES MELLITUS WITH KIDNEY COMPLICATION, WITH LONG-TERM CURRENT USE OF INSULIN: Status: ACTIVE | Noted: 2018-05-01

## 2022-06-10 PROBLEM — E11.29 TYPE 2 DIABETES MELLITUS WITH KIDNEY COMPLICATION, WITH LONG-TERM CURRENT USE OF INSULIN: Status: ACTIVE | Noted: 2018-05-01

## 2022-06-10 LAB
ALBUMIN SERPL-MCNC: 2.8 G/DL (ref 3.5–5.2)
ALBUMIN SERPL-MCNC: 2.9 G/DL (ref 3.5–5.2)
ALP SERPL-CCNC: 829 U/L (ref 39–117)
ALT SERPL W P-5'-P-CCNC: 37 U/L (ref 1–33)
ANION GAP SERPL CALCULATED.3IONS-SCNC: 12 MMOL/L (ref 5–15)
AST SERPL-CCNC: 60 U/L (ref 1–32)
BILIRUB CONJ SERPL-MCNC: 1.1 MG/DL (ref 0–0.3)
BILIRUB INDIRECT SERPL-MCNC: 0.4 MG/DL
BILIRUB SERPL-MCNC: 1.5 MG/DL (ref 0–1.2)
BUN SERPL-MCNC: 26 MG/DL (ref 6–20)
BUN/CREAT SERPL: 8.6 (ref 7–25)
CALCIUM SPEC-SCNC: 8.2 MG/DL (ref 8.6–10.5)
CHLORIDE SERPL-SCNC: 93 MMOL/L (ref 98–107)
CO2 SERPL-SCNC: 22 MMOL/L (ref 22–29)
CREAT SERPL-MCNC: 3.04 MG/DL (ref 0.57–1)
DEPRECATED RDW RBC AUTO: 48.5 FL (ref 37–54)
EGFRCR SERPLBLD CKD-EPI 2021: 17.4 ML/MIN/1.73
EOSINOPHIL # BLD MANUAL: 0.15 10*3/MM3 (ref 0–0.4)
EOSINOPHIL NFR BLD MANUAL: 1 % (ref 0.3–6.2)
ERYTHROCYTE [DISTWIDTH] IN BLOOD BY AUTOMATED COUNT: 16.5 % (ref 12.3–15.4)
GLUCOSE BLDC GLUCOMTR-MCNC: 106 MG/DL (ref 70–130)
GLUCOSE BLDC GLUCOMTR-MCNC: 145 MG/DL (ref 70–130)
GLUCOSE BLDC GLUCOMTR-MCNC: 154 MG/DL (ref 70–130)
GLUCOSE BLDC GLUCOMTR-MCNC: 68 MG/DL (ref 70–130)
GLUCOSE SERPL-MCNC: 114 MG/DL (ref 65–99)
HCT VFR BLD AUTO: 24.6 % (ref 34–46.6)
HGB BLD-MCNC: 7.9 G/DL (ref 12–15.9)
LYMPHOCYTES # BLD MANUAL: 0.9 10*3/MM3 (ref 0.7–3.1)
LYMPHOCYTES NFR BLD MANUAL: 3 % (ref 5–12)
MCH RBC QN AUTO: 26.5 PG (ref 26.6–33)
MCHC RBC AUTO-ENTMCNC: 32.1 G/DL (ref 31.5–35.7)
MCV RBC AUTO: 82.6 FL (ref 79–97)
MONOCYTES # BLD: 0.45 10*3/MM3 (ref 0.1–0.9)
NEUTROPHILS # BLD AUTO: 13.57 10*3/MM3 (ref 1.7–7)
NEUTROPHILS NFR BLD MANUAL: 90 % (ref 42.7–76)
PHOSPHATE SERPL-MCNC: 3.6 MG/DL (ref 2.5–4.5)
PLAT MORPH BLD: NORMAL
PLATELET # BLD AUTO: 259 10*3/MM3 (ref 140–450)
PMV BLD AUTO: 11.4 FL (ref 6–12)
POTASSIUM SERPL-SCNC: 4.6 MMOL/L (ref 3.5–5.2)
PROT SERPL-MCNC: 6.2 G/DL (ref 6–8.5)
RBC # BLD AUTO: 2.98 10*6/MM3 (ref 3.77–5.28)
RBC MORPH BLD: NORMAL
SODIUM SERPL-SCNC: 127 MMOL/L (ref 136–145)
T4 FREE SERPL-MCNC: 0.77 NG/DL (ref 0.93–1.7)
VARIANT LYMPHS NFR BLD MANUAL: 6 % (ref 19.6–45.3)
WBC MORPH BLD: NORMAL
WBC NRBC COR # BLD: 15.08 10*3/MM3 (ref 3.4–10.8)

## 2022-06-10 PROCEDURE — 82962 GLUCOSE BLOOD TEST: CPT

## 2022-06-10 PROCEDURE — 63710000001 INSULIN LISPRO (HUMAN) PER 5 UNITS: Performed by: PHYSICAL MEDICINE & REHABILITATION

## 2022-06-10 PROCEDURE — 84100 ASSAY OF PHOSPHORUS: CPT | Performed by: PHYSICAL MEDICINE & REHABILITATION

## 2022-06-10 PROCEDURE — 84439 ASSAY OF FREE THYROXINE: CPT | Performed by: PHYSICAL MEDICINE & REHABILITATION

## 2022-06-10 PROCEDURE — 99232 SBSQ HOSP IP/OBS MODERATE 35: CPT | Performed by: PSYCHIATRY & NEUROLOGY

## 2022-06-10 PROCEDURE — 80053 COMPREHEN METABOLIC PANEL: CPT | Performed by: PHYSICAL MEDICINE & REHABILITATION

## 2022-06-10 PROCEDURE — 85007 BL SMEAR W/DIFF WBC COUNT: CPT | Performed by: PHYSICAL MEDICINE & REHABILITATION

## 2022-06-10 PROCEDURE — 97535 SELF CARE MNGMENT TRAINING: CPT

## 2022-06-10 PROCEDURE — 63710000001 ONDANSETRON PER 8 MG: Performed by: STUDENT IN AN ORGANIZED HEALTH CARE EDUCATION/TRAINING PROGRAM

## 2022-06-10 PROCEDURE — 25010000002 EPOETIN ALFA-EPBX 10000 UNIT/ML SOLUTION: Performed by: INTERNAL MEDICINE

## 2022-06-10 PROCEDURE — 82248 BILIRUBIN DIRECT: CPT | Performed by: PHYSICAL MEDICINE & REHABILITATION

## 2022-06-10 PROCEDURE — 97110 THERAPEUTIC EXERCISES: CPT

## 2022-06-10 PROCEDURE — 99232 SBSQ HOSP IP/OBS MODERATE 35: CPT | Performed by: PHYSICIAN ASSISTANT

## 2022-06-10 PROCEDURE — 99232 SBSQ HOSP IP/OBS MODERATE 35: CPT | Performed by: NURSE PRACTITIONER

## 2022-06-10 PROCEDURE — 85025 COMPLETE CBC W/AUTO DIFF WBC: CPT | Performed by: PHYSICAL MEDICINE & REHABILITATION

## 2022-06-10 RX ORDER — LEVOTHYROXINE SODIUM 0.15 MG/1
150 TABLET ORAL
Status: DISCONTINUED | OUTPATIENT
Start: 2022-06-11 | End: 2022-07-12

## 2022-06-10 RX ADMIN — LEVOTHYROXINE SODIUM 125 MCG: 0.12 TABLET ORAL at 05:12

## 2022-06-10 RX ADMIN — ONDANSETRON HYDROCHLORIDE 4 MG: 4 TABLET, FILM COATED ORAL at 10:09

## 2022-06-10 RX ADMIN — HYDRALAZINE HYDROCHLORIDE 100 MG: 50 TABLET, FILM COATED ORAL at 19:28

## 2022-06-10 RX ADMIN — ACETAMINOPHEN 500 MG: 500 TABLET, FILM COATED ORAL at 02:40

## 2022-06-10 RX ADMIN — ASPIRIN 81 MG: 81 TABLET, COATED ORAL at 08:49

## 2022-06-10 RX ADMIN — SERTRALINE 100 MG: 100 TABLET, FILM COATED ORAL at 08:49

## 2022-06-10 RX ADMIN — PANTOPRAZOLE SODIUM 40 MG: 40 TABLET, DELAYED RELEASE ORAL at 05:12

## 2022-06-10 RX ADMIN — OXYCODONE 2.5 MG: 5 TABLET ORAL at 07:05

## 2022-06-10 RX ADMIN — OXYCODONE 2.5 MG: 5 TABLET ORAL at 23:32

## 2022-06-10 RX ADMIN — CARVEDILOL 12.5 MG: 12.5 TABLET, FILM COATED ORAL at 19:30

## 2022-06-10 RX ADMIN — ACETAMINOPHEN 500 MG: 500 TABLET, FILM COATED ORAL at 23:32

## 2022-06-10 RX ADMIN — LIDOCAINE 1 PATCH: 50 PATCH CUTANEOUS at 08:50

## 2022-06-10 RX ADMIN — HYDRALAZINE HYDROCHLORIDE 100 MG: 50 TABLET, FILM COATED ORAL at 02:41

## 2022-06-10 RX ADMIN — GABAPENTIN 200 MG: 100 CAPSULE ORAL at 19:28

## 2022-06-10 RX ADMIN — INSULIN GLARGINE-YFGN 10 UNITS: 100 INJECTION, SOLUTION SUBCUTANEOUS at 12:27

## 2022-06-10 RX ADMIN — INSULIN LISPRO 2 UNITS: 100 INJECTION, SOLUTION INTRAVENOUS; SUBCUTANEOUS at 12:27

## 2022-06-10 RX ADMIN — DOCUSATE SODIUM 50MG AND SENNOSIDES 8.6MG 1 TABLET: 8.6; 5 TABLET, FILM COATED ORAL at 08:49

## 2022-06-10 RX ADMIN — Medication 3 MG: at 22:11

## 2022-06-10 RX ADMIN — EPOETIN ALFA-EPBX 10000 UNITS: 10000 INJECTION, SOLUTION INTRAVENOUS; SUBCUTANEOUS at 17:25

## 2022-06-10 RX ADMIN — ACETAMINOPHEN 500 MG: 500 TABLET, FILM COATED ORAL at 07:05

## 2022-06-10 RX ADMIN — OXYCODONE 2.5 MG: 5 TABLET ORAL at 13:17

## 2022-06-10 RX ADMIN — OXYCODONE 2.5 MG: 5 TABLET ORAL at 02:41

## 2022-06-10 RX ADMIN — Medication 1 TABLET: at 08:49

## 2022-06-10 RX ADMIN — LOSARTAN POTASSIUM 100 MG: 100 TABLET, FILM COATED ORAL at 08:49

## 2022-06-10 RX ADMIN — DOCUSATE SODIUM 50MG AND SENNOSIDES 8.6MG 1 TABLET: 8.6; 5 TABLET, FILM COATED ORAL at 19:28

## 2022-06-10 RX ADMIN — GABAPENTIN 200 MG: 100 CAPSULE ORAL at 08:50

## 2022-06-10 RX ADMIN — ROPINIROLE HYDROCHLORIDE 0.75 MG: 0.5 TABLET, FILM COATED ORAL at 19:29

## 2022-06-10 RX ADMIN — ATORVASTATIN CALCIUM 40 MG: 20 TABLET, FILM COATED ORAL at 08:49

## 2022-06-10 RX ADMIN — CEFDINIR 300 MG: 300 CAPSULE ORAL at 08:49

## 2022-06-10 RX ADMIN — TAMSULOSIN HYDROCHLORIDE 0.4 MG: 0.4 CAPSULE ORAL at 08:49

## 2022-06-10 RX ADMIN — OXYCODONE 2.5 MG: 5 TABLET ORAL at 19:29

## 2022-06-11 LAB
ALBUMIN SERPL-MCNC: 3 G/DL (ref 3.5–5.2)
ANION GAP SERPL CALCULATED.3IONS-SCNC: 11 MMOL/L (ref 5–15)
BASOPHILS # BLD AUTO: 0.04 10*3/MM3 (ref 0–0.2)
BASOPHILS NFR BLD AUTO: 0.3 % (ref 0–1.5)
BUN SERPL-MCNC: 15 MG/DL (ref 6–20)
BUN/CREAT SERPL: 6.1 (ref 7–25)
CALCIUM SPEC-SCNC: 8.1 MG/DL (ref 8.6–10.5)
CHLORIDE SERPL-SCNC: 95 MMOL/L (ref 98–107)
CO2 SERPL-SCNC: 23 MMOL/L (ref 22–29)
CREAT SERPL-MCNC: 2.44 MG/DL (ref 0.57–1)
DEPRECATED RDW RBC AUTO: 50.6 FL (ref 37–54)
EGFRCR SERPLBLD CKD-EPI 2021: 22.7 ML/MIN/1.73
EOSINOPHIL # BLD AUTO: 0.02 10*3/MM3 (ref 0–0.4)
EOSINOPHIL NFR BLD AUTO: 0.1 % (ref 0.3–6.2)
ERYTHROCYTE [DISTWIDTH] IN BLOOD BY AUTOMATED COUNT: 16.6 % (ref 12.3–15.4)
GLUCOSE BLDC GLUCOMTR-MCNC: 179 MG/DL (ref 70–130)
GLUCOSE BLDC GLUCOMTR-MCNC: 183 MG/DL (ref 70–130)
GLUCOSE BLDC GLUCOMTR-MCNC: 71 MG/DL (ref 70–130)
GLUCOSE BLDC GLUCOMTR-MCNC: 90 MG/DL (ref 70–130)
GLUCOSE SERPL-MCNC: 146 MG/DL (ref 65–99)
HCT VFR BLD AUTO: 26.4 % (ref 34–46.6)
HGB BLD-MCNC: 8 G/DL (ref 12–15.9)
IMM GRANULOCYTES # BLD AUTO: 0.11 10*3/MM3 (ref 0–0.05)
IMM GRANULOCYTES NFR BLD AUTO: 0.7 % (ref 0–0.5)
LYMPHOCYTES # BLD AUTO: 0.8 10*3/MM3 (ref 0.7–3.1)
LYMPHOCYTES NFR BLD AUTO: 5 % (ref 19.6–45.3)
MCH RBC QN AUTO: 25.8 PG (ref 26.6–33)
MCHC RBC AUTO-ENTMCNC: 30.3 G/DL (ref 31.5–35.7)
MCV RBC AUTO: 85.2 FL (ref 79–97)
MONOCYTES # BLD AUTO: 1.26 10*3/MM3 (ref 0.1–0.9)
MONOCYTES NFR BLD AUTO: 7.9 % (ref 5–12)
NEUTROPHILS NFR BLD AUTO: 13.64 10*3/MM3 (ref 1.7–7)
NEUTROPHILS NFR BLD AUTO: 86 % (ref 42.7–76)
NRBC BLD AUTO-RTO: 0 /100 WBC (ref 0–0.2)
PHOSPHATE SERPL-MCNC: 2.5 MG/DL (ref 2.5–4.5)
PLATELET # BLD AUTO: 256 10*3/MM3 (ref 140–450)
PMV BLD AUTO: 10.7 FL (ref 6–12)
POTASSIUM SERPL-SCNC: 4 MMOL/L (ref 3.5–5.2)
RBC # BLD AUTO: 3.1 10*6/MM3 (ref 3.77–5.28)
SODIUM SERPL-SCNC: 129 MMOL/L (ref 136–145)
WBC NRBC COR # BLD: 15.87 10*3/MM3 (ref 3.4–10.8)

## 2022-06-11 PROCEDURE — 97110 THERAPEUTIC EXERCISES: CPT

## 2022-06-11 PROCEDURE — 97130 THER IVNTJ EA ADDL 15 MIN: CPT

## 2022-06-11 PROCEDURE — 63710000001 INSULIN LISPRO (HUMAN) PER 5 UNITS: Performed by: PHYSICAL MEDICINE & REHABILITATION

## 2022-06-11 PROCEDURE — 83520 IMMUNOASSAY QUANT NOS NONAB: CPT | Performed by: PSYCHIATRY & NEUROLOGY

## 2022-06-11 PROCEDURE — 99232 SBSQ HOSP IP/OBS MODERATE 35: CPT | Performed by: NURSE PRACTITIONER

## 2022-06-11 PROCEDURE — 85025 COMPLETE CBC W/AUTO DIFF WBC: CPT | Performed by: PHYSICAL MEDICINE & REHABILITATION

## 2022-06-11 PROCEDURE — 80069 RENAL FUNCTION PANEL: CPT | Performed by: PHYSICAL MEDICINE & REHABILITATION

## 2022-06-11 PROCEDURE — 97535 SELF CARE MNGMENT TRAINING: CPT

## 2022-06-11 PROCEDURE — 99232 SBSQ HOSP IP/OBS MODERATE 35: CPT | Performed by: INTERNAL MEDICINE

## 2022-06-11 PROCEDURE — 86037 ANCA TITER EACH ANTIBODY: CPT | Performed by: PSYCHIATRY & NEUROLOGY

## 2022-06-11 PROCEDURE — 82962 GLUCOSE BLOOD TEST: CPT

## 2022-06-11 PROCEDURE — 97129 THER IVNTJ 1ST 15 MIN: CPT

## 2022-06-11 RX ADMIN — INSULIN LISPRO 2 UNITS: 100 INJECTION, SOLUTION INTRAVENOUS; SUBCUTANEOUS at 17:16

## 2022-06-11 RX ADMIN — PANTOPRAZOLE SODIUM 40 MG: 40 TABLET, DELAYED RELEASE ORAL at 06:00

## 2022-06-11 RX ADMIN — HYDRALAZINE HYDROCHLORIDE 100 MG: 50 TABLET, FILM COATED ORAL at 06:00

## 2022-06-11 RX ADMIN — Medication 1 TABLET: at 08:10

## 2022-06-11 RX ADMIN — OXYCODONE 2.5 MG: 5 TABLET ORAL at 06:01

## 2022-06-11 RX ADMIN — HYDRALAZINE HYDROCHLORIDE 100 MG: 50 TABLET, FILM COATED ORAL at 14:58

## 2022-06-11 RX ADMIN — Medication 3 MG: at 20:45

## 2022-06-11 RX ADMIN — OXYCODONE 2.5 MG: 5 TABLET ORAL at 22:43

## 2022-06-11 RX ADMIN — CARVEDILOL 12.5 MG: 12.5 TABLET, FILM COATED ORAL at 08:10

## 2022-06-11 RX ADMIN — GABAPENTIN 200 MG: 100 CAPSULE ORAL at 08:11

## 2022-06-11 RX ADMIN — GABAPENTIN 200 MG: 100 CAPSULE ORAL at 20:45

## 2022-06-11 RX ADMIN — ROPINIROLE HYDROCHLORIDE 0.75 MG: 0.5 TABLET, FILM COATED ORAL at 20:45

## 2022-06-11 RX ADMIN — INSULIN LISPRO 2 UNITS: 100 INJECTION, SOLUTION INTRAVENOUS; SUBCUTANEOUS at 12:39

## 2022-06-11 RX ADMIN — TAMSULOSIN HYDROCHLORIDE 0.4 MG: 0.4 CAPSULE ORAL at 08:10

## 2022-06-11 RX ADMIN — CARVEDILOL 12.5 MG: 12.5 TABLET, FILM COATED ORAL at 17:16

## 2022-06-11 RX ADMIN — HYDRALAZINE HYDROCHLORIDE 100 MG: 50 TABLET, FILM COATED ORAL at 22:44

## 2022-06-11 RX ADMIN — DIPHENOXYLATE HYDROCHLORIDE AND ATROPINE SULFATE 1 TABLET: 2.5; .025 TABLET ORAL at 10:51

## 2022-06-11 RX ADMIN — INSULIN GLARGINE-YFGN 10 UNITS: 100 INJECTION, SOLUTION SUBCUTANEOUS at 12:39

## 2022-06-11 RX ADMIN — AMLODIPINE BESYLATE 10 MG: 10 TABLET ORAL at 08:10

## 2022-06-11 RX ADMIN — ASPIRIN 81 MG: 81 TABLET, COATED ORAL at 08:11

## 2022-06-11 RX ADMIN — OXYCODONE 2.5 MG: 5 TABLET ORAL at 10:51

## 2022-06-11 RX ADMIN — DOCUSATE SODIUM 50MG AND SENNOSIDES 8.6MG 1 TABLET: 8.6; 5 TABLET, FILM COATED ORAL at 08:11

## 2022-06-11 RX ADMIN — LOSARTAN POTASSIUM 100 MG: 100 TABLET, FILM COATED ORAL at 08:10

## 2022-06-11 RX ADMIN — LIDOCAINE 1 PATCH: 50 PATCH CUTANEOUS at 08:11

## 2022-06-11 RX ADMIN — LEVOTHYROXINE SODIUM 150 MCG: 0.15 TABLET ORAL at 06:00

## 2022-06-11 RX ADMIN — ATORVASTATIN CALCIUM 40 MG: 20 TABLET, FILM COATED ORAL at 08:11

## 2022-06-11 RX ADMIN — SERTRALINE 100 MG: 100 TABLET, FILM COATED ORAL at 08:10

## 2022-06-11 RX ADMIN — ACETAMINOPHEN 500 MG: 500 TABLET, FILM COATED ORAL at 10:51

## 2022-06-11 RX ADMIN — CEFDINIR 300 MG: 300 CAPSULE ORAL at 08:11

## 2022-06-11 RX ADMIN — OXYCODONE 2.5 MG: 5 TABLET ORAL at 18:29

## 2022-06-11 RX ADMIN — DIPHENOXYLATE HYDROCHLORIDE AND ATROPINE SULFATE 1 TABLET: 2.5; .025 TABLET ORAL at 10:22

## 2022-06-12 ENCOUNTER — APPOINTMENT (OUTPATIENT)
Dept: MRI IMAGING | Facility: HOSPITAL | Age: 57
End: 2022-06-12

## 2022-06-12 LAB
ALBUMIN SERPL-MCNC: 2.5 G/DL (ref 3.5–5.2)
ALBUMIN/GLOB SERPL: 0.7 G/DL
ALP SERPL-CCNC: 825 U/L (ref 39–117)
ALT SERPL W P-5'-P-CCNC: 24 U/L (ref 1–33)
ANION GAP SERPL CALCULATED.3IONS-SCNC: 8.6 MMOL/L (ref 5–15)
AST SERPL-CCNC: 50 U/L (ref 1–32)
BASOPHILS # BLD AUTO: 0.03 10*3/MM3 (ref 0–0.2)
BASOPHILS NFR BLD AUTO: 0.2 % (ref 0–1.5)
BILIRUB SERPL-MCNC: 1.4 MG/DL (ref 0–1.2)
BUN SERPL-MCNC: 25 MG/DL (ref 6–20)
BUN/CREAT SERPL: 6.5 (ref 7–25)
CALCIUM SPEC-SCNC: 7.9 MG/DL (ref 8.6–10.5)
CHLORIDE SERPL-SCNC: 93 MMOL/L (ref 98–107)
CO2 SERPL-SCNC: 23.4 MMOL/L (ref 22–29)
CREAT SERPL-MCNC: 3.84 MG/DL (ref 0.57–1)
DEPRECATED RDW RBC AUTO: 51 FL (ref 37–54)
EGFRCR SERPLBLD CKD-EPI 2021: 13.2 ML/MIN/1.73
EOSINOPHIL # BLD AUTO: 0.04 10*3/MM3 (ref 0–0.4)
EOSINOPHIL NFR BLD AUTO: 0.3 % (ref 0.3–6.2)
ERYTHROCYTE [DISTWIDTH] IN BLOOD BY AUTOMATED COUNT: 16.4 % (ref 12.3–15.4)
GLOBULIN UR ELPH-MCNC: 3.6 GM/DL
GLUCOSE BLDC GLUCOMTR-MCNC: 100 MG/DL (ref 70–130)
GLUCOSE BLDC GLUCOMTR-MCNC: 104 MG/DL (ref 70–130)
GLUCOSE BLDC GLUCOMTR-MCNC: 132 MG/DL (ref 70–130)
GLUCOSE BLDC GLUCOMTR-MCNC: 167 MG/DL (ref 70–130)
GLUCOSE BLDC GLUCOMTR-MCNC: 258 MG/DL (ref 70–130)
GLUCOSE SERPL-MCNC: 177 MG/DL (ref 65–99)
HCT VFR BLD AUTO: 24 % (ref 34–46.6)
HGB BLD-MCNC: 7.3 G/DL (ref 12–15.9)
IMM GRANULOCYTES # BLD AUTO: 0.06 10*3/MM3 (ref 0–0.05)
IMM GRANULOCYTES NFR BLD AUTO: 0.4 % (ref 0–0.5)
LYMPHOCYTES # BLD AUTO: 0.8 10*3/MM3 (ref 0.7–3.1)
LYMPHOCYTES NFR BLD AUTO: 5.3 % (ref 19.6–45.3)
MCH RBC QN AUTO: 26.1 PG (ref 26.6–33)
MCHC RBC AUTO-ENTMCNC: 30.4 G/DL (ref 31.5–35.7)
MCV RBC AUTO: 85.7 FL (ref 79–97)
MONOCYTES # BLD AUTO: 1.35 10*3/MM3 (ref 0.1–0.9)
MONOCYTES NFR BLD AUTO: 8.9 % (ref 5–12)
NEUTROPHILS NFR BLD AUTO: 12.91 10*3/MM3 (ref 1.7–7)
NEUTROPHILS NFR BLD AUTO: 84.9 % (ref 42.7–76)
NRBC BLD AUTO-RTO: 0 /100 WBC (ref 0–0.2)
PHOSPHATE SERPL-MCNC: 3.7 MG/DL (ref 2.5–4.5)
PLATELET # BLD AUTO: 253 10*3/MM3 (ref 140–450)
PMV BLD AUTO: 11.4 FL (ref 6–12)
POTASSIUM SERPL-SCNC: 4.5 MMOL/L (ref 3.5–5.2)
PROT SERPL-MCNC: 6.1 G/DL (ref 6–8.5)
RBC # BLD AUTO: 2.8 10*6/MM3 (ref 3.77–5.28)
SODIUM SERPL-SCNC: 125 MMOL/L (ref 136–145)
WBC NRBC COR # BLD: 15.19 10*3/MM3 (ref 3.4–10.8)

## 2022-06-12 PROCEDURE — 99232 SBSQ HOSP IP/OBS MODERATE 35: CPT | Performed by: NURSE PRACTITIONER

## 2022-06-12 PROCEDURE — 82962 GLUCOSE BLOOD TEST: CPT

## 2022-06-12 PROCEDURE — 85025 COMPLETE CBC W/AUTO DIFF WBC: CPT | Performed by: PHYSICAL MEDICINE & REHABILITATION

## 2022-06-12 PROCEDURE — 63710000001 INSULIN LISPRO (HUMAN) PER 5 UNITS: Performed by: PHYSICAL MEDICINE & REHABILITATION

## 2022-06-12 PROCEDURE — 84100 ASSAY OF PHOSPHORUS: CPT | Performed by: PHYSICAL MEDICINE & REHABILITATION

## 2022-06-12 PROCEDURE — 72146 MRI CHEST SPINE W/O DYE: CPT

## 2022-06-12 PROCEDURE — 99232 SBSQ HOSP IP/OBS MODERATE 35: CPT | Performed by: INTERNAL MEDICINE

## 2022-06-12 PROCEDURE — 80053 COMPREHEN METABOLIC PANEL: CPT | Performed by: NURSE PRACTITIONER

## 2022-06-12 RX ADMIN — LEVOTHYROXINE SODIUM 150 MCG: 0.15 TABLET ORAL at 06:33

## 2022-06-12 RX ADMIN — OXYCODONE 2.5 MG: 5 TABLET ORAL at 12:23

## 2022-06-12 RX ADMIN — ACETAMINOPHEN 500 MG: 500 TABLET, FILM COATED ORAL at 21:57

## 2022-06-12 RX ADMIN — INSULIN GLARGINE-YFGN 10 UNITS: 100 INJECTION, SOLUTION SUBCUTANEOUS at 12:15

## 2022-06-12 RX ADMIN — OXYCODONE 2.5 MG: 5 TABLET ORAL at 16:46

## 2022-06-12 RX ADMIN — OXYCODONE 2.5 MG: 5 TABLET ORAL at 03:26

## 2022-06-12 RX ADMIN — DOCUSATE SODIUM 50MG AND SENNOSIDES 8.6MG 1 TABLET: 8.6; 5 TABLET, FILM COATED ORAL at 21:56

## 2022-06-12 RX ADMIN — SERTRALINE 100 MG: 100 TABLET, FILM COATED ORAL at 07:45

## 2022-06-12 RX ADMIN — GABAPENTIN 200 MG: 100 CAPSULE ORAL at 21:57

## 2022-06-12 RX ADMIN — AMLODIPINE BESYLATE 10 MG: 10 TABLET ORAL at 07:45

## 2022-06-12 RX ADMIN — OXYCODONE 2.5 MG: 5 TABLET ORAL at 21:58

## 2022-06-12 RX ADMIN — HYDRALAZINE HYDROCHLORIDE 100 MG: 50 TABLET, FILM COATED ORAL at 06:33

## 2022-06-12 RX ADMIN — HYDRALAZINE HYDROCHLORIDE 100 MG: 50 TABLET, FILM COATED ORAL at 21:56

## 2022-06-12 RX ADMIN — Medication 1 TABLET: at 07:44

## 2022-06-12 RX ADMIN — ASPIRIN 81 MG: 81 TABLET, COATED ORAL at 07:44

## 2022-06-12 RX ADMIN — OXYCODONE 2.5 MG: 5 TABLET ORAL at 07:38

## 2022-06-12 RX ADMIN — CEFDINIR 300 MG: 300 CAPSULE ORAL at 07:44

## 2022-06-12 RX ADMIN — INSULIN LISPRO 4 UNITS: 100 INJECTION, SOLUTION INTRAVENOUS; SUBCUTANEOUS at 12:16

## 2022-06-12 RX ADMIN — CARVEDILOL 12.5 MG: 12.5 TABLET, FILM COATED ORAL at 07:45

## 2022-06-12 RX ADMIN — TAMSULOSIN HYDROCHLORIDE 0.4 MG: 0.4 CAPSULE ORAL at 07:45

## 2022-06-12 RX ADMIN — LIDOCAINE 1 PATCH: 50 PATCH CUTANEOUS at 07:46

## 2022-06-12 RX ADMIN — LOSARTAN POTASSIUM 100 MG: 100 TABLET, FILM COATED ORAL at 07:44

## 2022-06-12 RX ADMIN — ROPINIROLE HYDROCHLORIDE 0.75 MG: 0.5 TABLET, FILM COATED ORAL at 21:57

## 2022-06-12 RX ADMIN — PANTOPRAZOLE SODIUM 40 MG: 40 TABLET, DELAYED RELEASE ORAL at 06:33

## 2022-06-12 RX ADMIN — ATORVASTATIN CALCIUM 40 MG: 20 TABLET, FILM COATED ORAL at 07:44

## 2022-06-12 RX ADMIN — GABAPENTIN 200 MG: 100 CAPSULE ORAL at 07:46

## 2022-06-12 RX ADMIN — CARVEDILOL 12.5 MG: 12.5 TABLET, FILM COATED ORAL at 17:13

## 2022-06-12 RX ADMIN — ACETAMINOPHEN 500 MG: 500 TABLET, FILM COATED ORAL at 12:22

## 2022-06-12 RX ADMIN — HYDRALAZINE HYDROCHLORIDE 100 MG: 50 TABLET, FILM COATED ORAL at 14:43

## 2022-06-13 LAB
BASOPHILS # BLD AUTO: 0.06 10*3/MM3 (ref 0–0.2)
BASOPHILS NFR BLD AUTO: 0.4 % (ref 0–1.5)
DEPRECATED RDW RBC AUTO: 49.6 FL (ref 37–54)
EOSINOPHIL # BLD AUTO: 0.09 10*3/MM3 (ref 0–0.4)
EOSINOPHIL NFR BLD AUTO: 0.6 % (ref 0.3–6.2)
ERYTHROCYTE [DISTWIDTH] IN BLOOD BY AUTOMATED COUNT: 16.3 % (ref 12.3–15.4)
GLUCOSE BLDC GLUCOMTR-MCNC: 178 MG/DL (ref 70–130)
GLUCOSE BLDC GLUCOMTR-MCNC: 77 MG/DL (ref 70–130)
GLUCOSE BLDC GLUCOMTR-MCNC: 92 MG/DL (ref 70–130)
HCT VFR BLD AUTO: 26 % (ref 34–46.6)
HGB BLD-MCNC: 8 G/DL (ref 12–15.9)
IMM GRANULOCYTES # BLD AUTO: 0.09 10*3/MM3 (ref 0–0.05)
IMM GRANULOCYTES NFR BLD AUTO: 0.6 % (ref 0–0.5)
LYMPHOCYTES # BLD AUTO: 0.65 10*3/MM3 (ref 0.7–3.1)
LYMPHOCYTES NFR BLD AUTO: 4 % (ref 19.6–45.3)
MCH RBC QN AUTO: 26.1 PG (ref 26.6–33)
MCHC RBC AUTO-ENTMCNC: 30.8 G/DL (ref 31.5–35.7)
MCV RBC AUTO: 84.7 FL (ref 79–97)
MONOCYTES # BLD AUTO: 1.27 10*3/MM3 (ref 0.1–0.9)
MONOCYTES NFR BLD AUTO: 7.8 % (ref 5–12)
NEUTROPHILS NFR BLD AUTO: 14.19 10*3/MM3 (ref 1.7–7)
NEUTROPHILS NFR BLD AUTO: 86.6 % (ref 42.7–76)
NRBC BLD AUTO-RTO: 0 /100 WBC (ref 0–0.2)
PHOSPHATE SERPL-MCNC: 4.7 MG/DL (ref 2.5–4.5)
PLATELET # BLD AUTO: 322 10*3/MM3 (ref 140–450)
PMV BLD AUTO: 10.9 FL (ref 6–12)
RBC # BLD AUTO: 3.07 10*6/MM3 (ref 3.77–5.28)
WBC NRBC COR # BLD: 16.35 10*3/MM3 (ref 3.4–10.8)

## 2022-06-13 PROCEDURE — 97130 THER IVNTJ EA ADDL 15 MIN: CPT

## 2022-06-13 PROCEDURE — 97110 THERAPEUTIC EXERCISES: CPT

## 2022-06-13 PROCEDURE — 97129 THER IVNTJ 1ST 15 MIN: CPT

## 2022-06-13 PROCEDURE — 25010000002 EPOETIN ALFA-EPBX 10000 UNIT/ML SOLUTION: Performed by: INTERNAL MEDICINE

## 2022-06-13 PROCEDURE — 99232 SBSQ HOSP IP/OBS MODERATE 35: CPT | Performed by: PHYSICIAN ASSISTANT

## 2022-06-13 PROCEDURE — 82962 GLUCOSE BLOOD TEST: CPT

## 2022-06-13 PROCEDURE — 85025 COMPLETE CBC W/AUTO DIFF WBC: CPT | Performed by: PHYSICAL MEDICINE & REHABILITATION

## 2022-06-13 PROCEDURE — 84100 ASSAY OF PHOSPHORUS: CPT | Performed by: PHYSICAL MEDICINE & REHABILITATION

## 2022-06-13 PROCEDURE — 97535 SELF CARE MNGMENT TRAINING: CPT

## 2022-06-13 RX ADMIN — TAMSULOSIN HYDROCHLORIDE 0.4 MG: 0.4 CAPSULE ORAL at 08:05

## 2022-06-13 RX ADMIN — SERTRALINE 100 MG: 100 TABLET, FILM COATED ORAL at 08:05

## 2022-06-13 RX ADMIN — OXYCODONE 2.5 MG: 5 TABLET ORAL at 13:12

## 2022-06-13 RX ADMIN — OXYCODONE 2.5 MG: 5 TABLET ORAL at 19:50

## 2022-06-13 RX ADMIN — HYDRALAZINE HYDROCHLORIDE 100 MG: 50 TABLET, FILM COATED ORAL at 06:13

## 2022-06-13 RX ADMIN — LOSARTAN POTASSIUM 100 MG: 100 TABLET, FILM COATED ORAL at 08:09

## 2022-06-13 RX ADMIN — ATORVASTATIN CALCIUM 40 MG: 20 TABLET, FILM COATED ORAL at 08:04

## 2022-06-13 RX ADMIN — OXYCODONE 2.5 MG: 5 TABLET ORAL at 08:05

## 2022-06-13 RX ADMIN — EPOETIN ALFA-EPBX 10000 UNITS: 10000 INJECTION, SOLUTION INTRAVENOUS; SUBCUTANEOUS at 15:40

## 2022-06-13 RX ADMIN — INSULIN GLARGINE-YFGN 10 UNITS: 100 INJECTION, SOLUTION SUBCUTANEOUS at 11:56

## 2022-06-13 RX ADMIN — HYDRALAZINE HYDROCHLORIDE 100 MG: 50 TABLET, FILM COATED ORAL at 22:11

## 2022-06-13 RX ADMIN — GABAPENTIN 200 MG: 100 CAPSULE ORAL at 22:02

## 2022-06-13 RX ADMIN — PANTOPRAZOLE SODIUM 40 MG: 40 TABLET, DELAYED RELEASE ORAL at 06:12

## 2022-06-13 RX ADMIN — CARVEDILOL 12.5 MG: 12.5 TABLET, FILM COATED ORAL at 20:01

## 2022-06-13 RX ADMIN — Medication 1 TABLET: at 08:05

## 2022-06-13 RX ADMIN — ROPINIROLE HYDROCHLORIDE 0.75 MG: 0.5 TABLET, FILM COATED ORAL at 22:02

## 2022-06-13 RX ADMIN — ASPIRIN 81 MG: 81 TABLET, COATED ORAL at 08:05

## 2022-06-13 RX ADMIN — LEVOTHYROXINE SODIUM 150 MCG: 0.15 TABLET ORAL at 06:13

## 2022-06-13 RX ADMIN — LIDOCAINE 1 PATCH: 50 PATCH CUTANEOUS at 08:05

## 2022-06-13 RX ADMIN — DOCUSATE SODIUM 50MG AND SENNOSIDES 8.6MG 1 TABLET: 8.6; 5 TABLET, FILM COATED ORAL at 22:02

## 2022-06-13 RX ADMIN — GABAPENTIN 200 MG: 100 CAPSULE ORAL at 08:04

## 2022-06-13 RX ADMIN — CEFDINIR 300 MG: 300 CAPSULE ORAL at 08:05

## 2022-06-14 LAB
ALBUMIN SERPL-MCNC: 2.3 G/DL (ref 3.5–5.2)
ALBUMIN/GLOB SERPL: 0.5 G/DL
ALP SERPL-CCNC: 1061 U/L (ref 39–117)
ALT SERPL W P-5'-P-CCNC: 20 U/L (ref 1–33)
ANION GAP SERPL CALCULATED.3IONS-SCNC: 12.2 MMOL/L (ref 5–15)
AST SERPL-CCNC: 48 U/L (ref 1–32)
BASOPHILS # BLD AUTO: 0.04 10*3/MM3 (ref 0–0.2)
BASOPHILS NFR BLD AUTO: 0.3 % (ref 0–1.5)
BILIRUB CONJ SERPL-MCNC: 0.9 MG/DL (ref 0–0.3)
BILIRUB SERPL-MCNC: 1.2 MG/DL (ref 0–1.2)
BUN SERPL-MCNC: 18 MG/DL (ref 6–20)
BUN/CREAT SERPL: 5.9 (ref 7–25)
C-ANCA TITR SER IF: NORMAL TITER
CALCIUM SPEC-SCNC: 7.9 MG/DL (ref 8.6–10.5)
CHLORIDE SERPL-SCNC: 95 MMOL/L (ref 98–107)
CO2 SERPL-SCNC: 22.8 MMOL/L (ref 22–29)
CREAT SERPL-MCNC: 3.04 MG/DL (ref 0.57–1)
DEPRECATED RDW RBC AUTO: 50.5 FL (ref 37–54)
EGFRCR SERPLBLD CKD-EPI 2021: 17.4 ML/MIN/1.73
EOSINOPHIL # BLD AUTO: 0.01 10*3/MM3 (ref 0–0.4)
EOSINOPHIL NFR BLD AUTO: 0.1 % (ref 0.3–6.2)
ERYTHROCYTE [DISTWIDTH] IN BLOOD BY AUTOMATED COUNT: 16.3 % (ref 12.3–15.4)
GLOBULIN UR ELPH-MCNC: 4.2 GM/DL
GLUCOSE BLDC GLUCOMTR-MCNC: 136 MG/DL (ref 70–130)
GLUCOSE BLDC GLUCOMTR-MCNC: 153 MG/DL (ref 70–130)
GLUCOSE BLDC GLUCOMTR-MCNC: 215 MG/DL (ref 70–130)
GLUCOSE BLDC GLUCOMTR-MCNC: 233 MG/DL (ref 70–130)
GLUCOSE SERPL-MCNC: 223 MG/DL (ref 65–99)
HCT VFR BLD AUTO: 25.9 % (ref 34–46.6)
HEMOCCULT STL QL: NEGATIVE
HGB BLD-MCNC: 7.8 G/DL (ref 12–15.9)
IMM GRANULOCYTES # BLD AUTO: 0.07 10*3/MM3 (ref 0–0.05)
IMM GRANULOCYTES NFR BLD AUTO: 0.5 % (ref 0–0.5)
LYMPHOCYTES # BLD AUTO: 0.68 10*3/MM3 (ref 0.7–3.1)
LYMPHOCYTES NFR BLD AUTO: 5.3 % (ref 19.6–45.3)
MCH RBC QN AUTO: 25.8 PG (ref 26.6–33)
MCHC RBC AUTO-ENTMCNC: 30.1 G/DL (ref 31.5–35.7)
MCV RBC AUTO: 85.8 FL (ref 79–97)
MONOCYTES # BLD AUTO: 1.28 10*3/MM3 (ref 0.1–0.9)
MONOCYTES NFR BLD AUTO: 10.1 % (ref 5–12)
MYELOPEROXIDASE AB SER IA-ACNC: <9 U/ML (ref 0–9)
NEUTROPHILS NFR BLD AUTO: 10.65 10*3/MM3 (ref 1.7–7)
NEUTROPHILS NFR BLD AUTO: 83.7 % (ref 42.7–76)
NRBC BLD AUTO-RTO: 0 /100 WBC (ref 0–0.2)
P-ANCA ATYPICAL TITR SER IF: NORMAL TITER
P-ANCA TITR SER IF: NORMAL TITER
PHOSPHATE SERPL-MCNC: 2.5 MG/DL (ref 2.5–4.5)
PLATELET # BLD AUTO: 288 10*3/MM3 (ref 140–450)
PMV BLD AUTO: 11.2 FL (ref 6–12)
POTASSIUM SERPL-SCNC: 4.2 MMOL/L (ref 3.5–5.2)
PROT SERPL-MCNC: 6.5 G/DL (ref 6–8.5)
PROTEINASE3 AB SER IA-ACNC: <3.5 U/ML (ref 0–3.5)
RBC # BLD AUTO: 3.02 10*6/MM3 (ref 3.77–5.28)
SARS-COV-2 ORF1AB RESP QL NAA+PROBE: NOT DETECTED
SODIUM SERPL-SCNC: 130 MMOL/L (ref 136–145)
WBC NRBC COR # BLD: 12.73 10*3/MM3 (ref 3.4–10.8)

## 2022-06-14 PROCEDURE — 97130 THER IVNTJ EA ADDL 15 MIN: CPT

## 2022-06-14 PROCEDURE — 84100 ASSAY OF PHOSPHORUS: CPT | Performed by: PHYSICAL MEDICINE & REHABILITATION

## 2022-06-14 PROCEDURE — 97110 THERAPEUTIC EXERCISES: CPT | Performed by: OCCUPATIONAL THERAPIST

## 2022-06-14 PROCEDURE — 82962 GLUCOSE BLOOD TEST: CPT

## 2022-06-14 PROCEDURE — 99231 SBSQ HOSP IP/OBS SF/LOW 25: CPT | Performed by: NEUROLOGICAL SURGERY

## 2022-06-14 PROCEDURE — 82272 OCCULT BLD FECES 1-3 TESTS: CPT | Performed by: INTERNAL MEDICINE

## 2022-06-14 PROCEDURE — U0004 COV-19 TEST NON-CDC HGH THRU: HCPCS | Performed by: NURSE PRACTITIONER

## 2022-06-14 PROCEDURE — 82550 ASSAY OF CK (CPK): CPT | Performed by: PHYSICAL MEDICINE & REHABILITATION

## 2022-06-14 PROCEDURE — 97110 THERAPEUTIC EXERCISES: CPT

## 2022-06-14 PROCEDURE — 97129 THER IVNTJ 1ST 15 MIN: CPT

## 2022-06-14 PROCEDURE — 85025 COMPLETE CBC W/AUTO DIFF WBC: CPT | Performed by: PHYSICAL MEDICINE & REHABILITATION

## 2022-06-14 PROCEDURE — 82248 BILIRUBIN DIRECT: CPT | Performed by: PHYSICIAN ASSISTANT

## 2022-06-14 PROCEDURE — 63710000001 INSULIN LISPRO (HUMAN) PER 5 UNITS: Performed by: PHYSICAL MEDICINE & REHABILITATION

## 2022-06-14 PROCEDURE — 80053 COMPREHEN METABOLIC PANEL: CPT | Performed by: PHYSICIAN ASSISTANT

## 2022-06-14 PROCEDURE — 97535 SELF CARE MNGMENT TRAINING: CPT | Performed by: OCCUPATIONAL THERAPIST

## 2022-06-14 PROCEDURE — 99232 SBSQ HOSP IP/OBS MODERATE 35: CPT | Performed by: PHYSICIAN ASSISTANT

## 2022-06-14 RX ORDER — OXYCODONE HYDROCHLORIDE 5 MG/1
5 TABLET ORAL EVERY 4 HOURS PRN
Status: DISPENSED | OUTPATIENT
Start: 2022-06-14 | End: 2022-06-18

## 2022-06-14 RX ORDER — OXYCODONE HYDROCHLORIDE 5 MG/1
5 TABLET ORAL EVERY 4 HOURS PRN
Status: DISPENSED | OUTPATIENT
Start: 2022-06-14 | End: 2022-06-14

## 2022-06-14 RX ORDER — OXYCODONE HYDROCHLORIDE 5 MG/1
5 TABLET ORAL EVERY 4 HOURS PRN
Status: DISCONTINUED | OUTPATIENT
Start: 2022-06-14 | End: 2022-06-14

## 2022-06-14 RX ORDER — GABAPENTIN 300 MG/1
300 CAPSULE ORAL 2 TIMES DAILY
Status: DISCONTINUED | OUTPATIENT
Start: 2022-06-14 | End: 2022-07-22 | Stop reason: HOSPADM

## 2022-06-14 RX ADMIN — CARVEDILOL 12.5 MG: 12.5 TABLET, FILM COATED ORAL at 07:16

## 2022-06-14 RX ADMIN — HYDRALAZINE HYDROCHLORIDE 100 MG: 50 TABLET, FILM COATED ORAL at 06:02

## 2022-06-14 RX ADMIN — OXYCODONE 2.5 MG: 5 TABLET ORAL at 00:36

## 2022-06-14 RX ADMIN — LEVOTHYROXINE SODIUM 150 MCG: 0.15 TABLET ORAL at 06:01

## 2022-06-14 RX ADMIN — INSULIN LISPRO 3 UNITS: 100 INJECTION, SOLUTION INTRAVENOUS; SUBCUTANEOUS at 12:22

## 2022-06-14 RX ADMIN — INSULIN LISPRO 3 UNITS: 100 INJECTION, SOLUTION INTRAVENOUS; SUBCUTANEOUS at 18:01

## 2022-06-14 RX ADMIN — PANTOPRAZOLE SODIUM 40 MG: 40 TABLET, DELAYED RELEASE ORAL at 06:01

## 2022-06-14 RX ADMIN — LIDOCAINE 1 PATCH: 50 PATCH CUTANEOUS at 07:16

## 2022-06-14 RX ADMIN — DIPHENOXYLATE HYDROCHLORIDE AND ATROPINE SULFATE 1 TABLET: 2.5; .025 TABLET ORAL at 12:21

## 2022-06-14 RX ADMIN — DOCUSATE SODIUM 50MG AND SENNOSIDES 8.6MG 1 TABLET: 8.6; 5 TABLET, FILM COATED ORAL at 22:24

## 2022-06-14 RX ADMIN — OXYCODONE 5 MG: 5 TABLET ORAL at 14:46

## 2022-06-14 RX ADMIN — CARVEDILOL 12.5 MG: 12.5 TABLET, FILM COATED ORAL at 18:01

## 2022-06-14 RX ADMIN — HYDRALAZINE HYDROCHLORIDE 10 MG: 10 TABLET, FILM COATED ORAL at 00:36

## 2022-06-14 RX ADMIN — Medication 3 MG: at 22:24

## 2022-06-14 RX ADMIN — GABAPENTIN 300 MG: 300 CAPSULE ORAL at 22:25

## 2022-06-14 RX ADMIN — OXYCODONE 5 MG: 5 TABLET ORAL at 18:40

## 2022-06-14 RX ADMIN — Medication 1 TABLET: at 07:16

## 2022-06-14 RX ADMIN — AMLODIPINE BESYLATE 10 MG: 10 TABLET ORAL at 07:16

## 2022-06-14 RX ADMIN — ROPINIROLE HYDROCHLORIDE 0.75 MG: 0.5 TABLET, FILM COATED ORAL at 22:24

## 2022-06-14 RX ADMIN — SERTRALINE 100 MG: 100 TABLET, FILM COATED ORAL at 07:16

## 2022-06-14 RX ADMIN — ASPIRIN 81 MG: 81 TABLET, COATED ORAL at 07:15

## 2022-06-14 RX ADMIN — GABAPENTIN 200 MG: 100 CAPSULE ORAL at 07:15

## 2022-06-14 RX ADMIN — DOCUSATE SODIUM 50MG AND SENNOSIDES 8.6MG 1 TABLET: 8.6; 5 TABLET, FILM COATED ORAL at 07:15

## 2022-06-14 RX ADMIN — HYDRALAZINE HYDROCHLORIDE 100 MG: 50 TABLET, FILM COATED ORAL at 22:24

## 2022-06-14 RX ADMIN — OXYCODONE 2.5 MG: 5 TABLET ORAL at 06:01

## 2022-06-14 RX ADMIN — HYDRALAZINE HYDROCHLORIDE 100 MG: 50 TABLET, FILM COATED ORAL at 14:24

## 2022-06-14 RX ADMIN — INSULIN GLARGINE-YFGN 10 UNITS: 100 INJECTION, SOLUTION SUBCUTANEOUS at 12:22

## 2022-06-14 RX ADMIN — TAMSULOSIN HYDROCHLORIDE 0.4 MG: 0.4 CAPSULE ORAL at 07:16

## 2022-06-14 RX ADMIN — OXYCODONE 5 MG: 5 TABLET ORAL at 10:23

## 2022-06-14 RX ADMIN — ATORVASTATIN CALCIUM 40 MG: 20 TABLET, FILM COATED ORAL at 07:15

## 2022-06-14 RX ADMIN — LOSARTAN POTASSIUM 100 MG: 100 TABLET, FILM COATED ORAL at 07:20

## 2022-06-15 ENCOUNTER — HOSPITAL ENCOUNTER (OUTPATIENT)
Facility: HOSPITAL | Age: 57
Setting detail: SURGERY ADMIT
Discharge: NURSING, REHAB OR LONG-TERM CARE | End: 2022-06-15
Attending: NEUROLOGICAL SURGERY | Admitting: NEUROLOGICAL SURGERY

## 2022-06-15 ENCOUNTER — ANESTHESIA EVENT (OUTPATIENT)
Dept: PERIOP | Facility: HOSPITAL | Age: 57
End: 2022-06-15

## 2022-06-15 ENCOUNTER — ANESTHESIA (OUTPATIENT)
Dept: PERIOP | Facility: HOSPITAL | Age: 57
End: 2022-06-15

## 2022-06-15 VITALS
SYSTOLIC BLOOD PRESSURE: 154 MMHG | DIASTOLIC BLOOD PRESSURE: 71 MMHG | RESPIRATION RATE: 16 BRPM | OXYGEN SATURATION: 98 % | TEMPERATURE: 98.5 F | HEART RATE: 55 BPM

## 2022-06-15 DIAGNOSIS — R53.1 GENERALIZED WEAKNESS: ICD-10-CM

## 2022-06-15 LAB
CK SERPL-CCNC: 121 U/L (ref 20–180)
GLUCOSE BLDC GLUCOMTR-MCNC: 126 MG/DL (ref 70–130)
GLUCOSE BLDC GLUCOMTR-MCNC: 173 MG/DL (ref 70–130)
GLUCOSE BLDC GLUCOMTR-MCNC: 201 MG/DL (ref 70–130)
GLUCOSE BLDC GLUCOMTR-MCNC: 446 MG/DL (ref 70–130)
GLUCOSE BLDC GLUCOMTR-MCNC: 64 MG/DL (ref 70–130)
GLUCOSE BLDC GLUCOMTR-MCNC: 75 MG/DL (ref 70–130)
GLUCOSE BLDC GLUCOMTR-MCNC: 78 MG/DL (ref 70–130)
GLUCOSE BLDC GLUCOMTR-MCNC: 85 MG/DL (ref 70–130)
GLUCOSE BLDC GLUCOMTR-MCNC: 97 MG/DL (ref 70–130)
GLUCOSE BLDC GLUCOMTR-MCNC: 97 MG/DL (ref 70–130)

## 2022-06-15 PROCEDURE — 25010000002 EPOETIN ALFA-EPBX 10000 UNIT/ML SOLUTION: Performed by: INTERNAL MEDICINE

## 2022-06-15 PROCEDURE — 25010000002 HEPARIN (PORCINE) PER 1000 UNITS: Performed by: STUDENT IN AN ORGANIZED HEALTH CARE EDUCATION/TRAINING PROGRAM

## 2022-06-15 PROCEDURE — 82962 GLUCOSE BLOOD TEST: CPT

## 2022-06-15 PROCEDURE — 20205 DEEP MUSCLE BIOPSY: CPT | Performed by: NEUROLOGICAL SURGERY

## 2022-06-15 PROCEDURE — 25010000002 FENTANYL CITRATE (PF) 50 MCG/ML SOLUTION: Performed by: ANESTHESIOLOGY

## 2022-06-15 PROCEDURE — 25010000002 CEFAZOLIN IN DEXTROSE 2-4 GM/100ML-% SOLUTION: Performed by: NEUROLOGICAL SURGERY

## 2022-06-15 PROCEDURE — 88300 SURGICAL PATH GROSS: CPT | Performed by: NEUROLOGICAL SURGERY

## 2022-06-15 PROCEDURE — 0KBR0ZX EXCISION OF LEFT UPPER LEG MUSCLE, OPEN APPROACH, DIAGNOSTIC: ICD-10-PCS | Performed by: NEUROLOGICAL SURGERY

## 2022-06-15 PROCEDURE — 25010000002 PROPOFOL 10 MG/ML EMULSION: Performed by: ANESTHESIOLOGY

## 2022-06-15 RX ORDER — DIPHENHYDRAMINE HYDROCHLORIDE 50 MG/ML
12.5 INJECTION INTRAMUSCULAR; INTRAVENOUS
Status: DISCONTINUED | OUTPATIENT
Start: 2022-06-15 | End: 2022-06-15 | Stop reason: HOSPADM

## 2022-06-15 RX ORDER — SODIUM CHLORIDE 9 MG/ML
9 INJECTION, SOLUTION INTRAVENOUS CONTINUOUS
Status: DISCONTINUED | OUTPATIENT
Start: 2022-06-15 | End: 2022-06-15 | Stop reason: HOSPADM

## 2022-06-15 RX ORDER — SODIUM CHLORIDE 0.9 % (FLUSH) 0.9 %
3 SYRINGE (ML) INJECTION EVERY 12 HOURS SCHEDULED
Status: DISCONTINUED | OUTPATIENT
Start: 2022-06-15 | End: 2022-06-15 | Stop reason: HOSPADM

## 2022-06-15 RX ORDER — EPHEDRINE SULFATE 50 MG/ML
5 INJECTION, SOLUTION INTRAVENOUS ONCE AS NEEDED
Status: DISCONTINUED | OUTPATIENT
Start: 2022-06-15 | End: 2022-06-15 | Stop reason: HOSPADM

## 2022-06-15 RX ORDER — HYDROMORPHONE HYDROCHLORIDE 1 MG/ML
0.5 INJECTION, SOLUTION INTRAMUSCULAR; INTRAVENOUS; SUBCUTANEOUS
Status: DISCONTINUED | OUTPATIENT
Start: 2022-06-15 | End: 2022-06-15 | Stop reason: HOSPADM

## 2022-06-15 RX ORDER — CEFAZOLIN SODIUM 2 G/100ML
2 INJECTION, SOLUTION INTRAVENOUS ONCE
Status: COMPLETED | OUTPATIENT
Start: 2022-06-15 | End: 2022-06-15

## 2022-06-15 RX ORDER — SODIUM CHLORIDE 0.9 % (FLUSH) 0.9 %
3-10 SYRINGE (ML) INJECTION AS NEEDED
Status: DISCONTINUED | OUTPATIENT
Start: 2022-06-15 | End: 2022-06-15 | Stop reason: HOSPADM

## 2022-06-15 RX ORDER — LABETALOL HYDROCHLORIDE 5 MG/ML
5 INJECTION, SOLUTION INTRAVENOUS
Status: DISCONTINUED | OUTPATIENT
Start: 2022-06-15 | End: 2022-06-15 | Stop reason: HOSPADM

## 2022-06-15 RX ORDER — ONDANSETRON 2 MG/ML
4 INJECTION INTRAMUSCULAR; INTRAVENOUS ONCE AS NEEDED
Status: DISCONTINUED | OUTPATIENT
Start: 2022-06-15 | End: 2022-06-15 | Stop reason: HOSPADM

## 2022-06-15 RX ORDER — LIDOCAINE HYDROCHLORIDE 10 MG/ML
0.5 INJECTION, SOLUTION EPIDURAL; INFILTRATION; INTRACAUDAL; PERINEURAL ONCE AS NEEDED
Status: DISCONTINUED | OUTPATIENT
Start: 2022-06-15 | End: 2022-06-15 | Stop reason: HOSPADM

## 2022-06-15 RX ORDER — FAMOTIDINE 10 MG/ML
20 INJECTION, SOLUTION INTRAVENOUS ONCE
Status: COMPLETED | OUTPATIENT
Start: 2022-06-15 | End: 2022-06-15

## 2022-06-15 RX ORDER — SODIUM CHLORIDE, SODIUM LACTATE, POTASSIUM CHLORIDE, CALCIUM CHLORIDE 600; 310; 30; 20 MG/100ML; MG/100ML; MG/100ML; MG/100ML
9 INJECTION, SOLUTION INTRAVENOUS CONTINUOUS
Status: DISCONTINUED | OUTPATIENT
Start: 2022-06-15 | End: 2022-06-15 | Stop reason: HOSPADM

## 2022-06-15 RX ORDER — IBUPROFEN 600 MG/1
600 TABLET ORAL ONCE AS NEEDED
Status: DISCONTINUED | OUTPATIENT
Start: 2022-06-15 | End: 2022-06-15 | Stop reason: HOSPADM

## 2022-06-15 RX ORDER — PROMETHAZINE HYDROCHLORIDE 25 MG/1
25 SUPPOSITORY RECTAL ONCE AS NEEDED
Status: DISCONTINUED | OUTPATIENT
Start: 2022-06-15 | End: 2022-06-15 | Stop reason: HOSPADM

## 2022-06-15 RX ORDER — FENTANYL CITRATE 50 UG/ML
50 INJECTION, SOLUTION INTRAMUSCULAR; INTRAVENOUS
Status: DISCONTINUED | OUTPATIENT
Start: 2022-06-15 | End: 2022-06-15 | Stop reason: HOSPADM

## 2022-06-15 RX ORDER — ALBUMIN (HUMAN) 12.5 G/50ML
12.5 SOLUTION INTRAVENOUS AS NEEDED
Status: ACTIVE | OUTPATIENT
Start: 2022-06-15 | End: 2022-06-15

## 2022-06-15 RX ORDER — PROMETHAZINE HYDROCHLORIDE 25 MG/1
25 TABLET ORAL ONCE AS NEEDED
Status: DISCONTINUED | OUTPATIENT
Start: 2022-06-15 | End: 2022-06-15 | Stop reason: HOSPADM

## 2022-06-15 RX ORDER — FLUMAZENIL 0.1 MG/ML
0.2 INJECTION INTRAVENOUS AS NEEDED
Status: DISCONTINUED | OUTPATIENT
Start: 2022-06-15 | End: 2022-06-15 | Stop reason: HOSPADM

## 2022-06-15 RX ORDER — PROPOFOL 10 MG/ML
VIAL (ML) INTRAVENOUS AS NEEDED
Status: DISCONTINUED | OUTPATIENT
Start: 2022-06-15 | End: 2022-06-15 | Stop reason: SURG

## 2022-06-15 RX ORDER — HYDROCODONE BITARTRATE AND ACETAMINOPHEN 7.5; 325 MG/1; MG/1
1 TABLET ORAL ONCE AS NEEDED
Status: DISCONTINUED | OUTPATIENT
Start: 2022-06-15 | End: 2022-06-15 | Stop reason: HOSPADM

## 2022-06-15 RX ORDER — DIPHENHYDRAMINE HCL 25 MG
25 CAPSULE ORAL
Status: DISCONTINUED | OUTPATIENT
Start: 2022-06-15 | End: 2022-06-15 | Stop reason: HOSPADM

## 2022-06-15 RX ORDER — HYDRALAZINE HYDROCHLORIDE 20 MG/ML
5 INJECTION INTRAMUSCULAR; INTRAVENOUS
Status: DISCONTINUED | OUTPATIENT
Start: 2022-06-15 | End: 2022-06-15 | Stop reason: HOSPADM

## 2022-06-15 RX ORDER — OXYCODONE AND ACETAMINOPHEN 7.5; 325 MG/1; MG/1
1 TABLET ORAL EVERY 4 HOURS PRN
Status: DISCONTINUED | OUTPATIENT
Start: 2022-06-15 | End: 2022-06-15 | Stop reason: HOSPADM

## 2022-06-15 RX ORDER — SODIUM CHLORIDE 9 MG/ML
INJECTION, SOLUTION INTRAVENOUS AS NEEDED
Status: DISCONTINUED | OUTPATIENT
Start: 2022-06-15 | End: 2022-06-15 | Stop reason: HOSPADM

## 2022-06-15 RX ORDER — NALOXONE HCL 0.4 MG/ML
0.2 VIAL (ML) INJECTION AS NEEDED
Status: DISCONTINUED | OUTPATIENT
Start: 2022-06-15 | End: 2022-06-15 | Stop reason: HOSPADM

## 2022-06-15 RX ORDER — BUPIVACAINE HYDROCHLORIDE AND EPINEPHRINE 2.5; 5 MG/ML; UG/ML
INJECTION, SOLUTION EPIDURAL; INFILTRATION; INTRACAUDAL; PERINEURAL AS NEEDED
Status: DISCONTINUED | OUTPATIENT
Start: 2022-06-15 | End: 2022-06-15 | Stop reason: HOSPADM

## 2022-06-15 RX ADMIN — OXYCODONE 5 MG: 5 TABLET ORAL at 20:32

## 2022-06-15 RX ADMIN — DOCUSATE SODIUM 50MG AND SENNOSIDES 8.6MG 1 TABLET: 8.6; 5 TABLET, FILM COATED ORAL at 20:29

## 2022-06-15 RX ADMIN — ROPINIROLE HYDROCHLORIDE 0.75 MG: 0.5 TABLET, FILM COATED ORAL at 20:29

## 2022-06-15 RX ADMIN — OXYCODONE 5 MG: 5 TABLET ORAL at 16:11

## 2022-06-15 RX ADMIN — FENTANYL CITRATE 50 MCG: 50 INJECTION INTRAMUSCULAR; INTRAVENOUS at 14:09

## 2022-06-15 RX ADMIN — CEFAZOLIN SODIUM 2 G: 2 INJECTION, SOLUTION INTRAVENOUS at 12:53

## 2022-06-15 RX ADMIN — HEPARIN SODIUM 3800 UNITS: 1000 INJECTION INTRAVENOUS; SUBCUTANEOUS at 10:09

## 2022-06-15 RX ADMIN — ACETAMINOPHEN 500 MG: 500 TABLET, FILM COATED ORAL at 20:29

## 2022-06-15 RX ADMIN — SERTRALINE 100 MG: 100 TABLET, FILM COATED ORAL at 16:18

## 2022-06-15 RX ADMIN — LIDOCAINE 1 PATCH: 50 PATCH CUTANEOUS at 16:11

## 2022-06-15 RX ADMIN — FAMOTIDINE 20 MG: 10 INJECTION, SOLUTION INTRAVENOUS at 12:38

## 2022-06-15 RX ADMIN — PROPOFOL 50 MG: 10 INJECTION, EMULSION INTRAVENOUS at 13:09

## 2022-06-15 RX ADMIN — OXYCODONE 5 MG: 5 TABLET ORAL at 07:10

## 2022-06-15 RX ADMIN — AMLODIPINE BESYLATE 10 MG: 10 TABLET ORAL at 07:51

## 2022-06-15 RX ADMIN — LEVOTHYROXINE SODIUM 150 MCG: 0.15 TABLET ORAL at 06:10

## 2022-06-15 RX ADMIN — CARVEDILOL 12.5 MG: 12.5 TABLET, FILM COATED ORAL at 17:34

## 2022-06-15 RX ADMIN — Medication 1 TABLET: at 16:12

## 2022-06-15 RX ADMIN — ASPIRIN 81 MG: 81 TABLET, COATED ORAL at 16:16

## 2022-06-15 RX ADMIN — SODIUM CHLORIDE 9 ML/HR: 9 INJECTION, SOLUTION INTRAVENOUS at 12:39

## 2022-06-15 RX ADMIN — GABAPENTIN 300 MG: 300 CAPSULE ORAL at 20:29

## 2022-06-15 RX ADMIN — EPOETIN ALFA-EPBX 10000 UNITS: 10000 INJECTION, SOLUTION INTRAVENOUS; SUBCUTANEOUS at 09:15

## 2022-06-15 RX ADMIN — TAMSULOSIN HYDROCHLORIDE 0.4 MG: 0.4 CAPSULE ORAL at 16:11

## 2022-06-15 RX ADMIN — OXYCODONE 5 MG: 5 TABLET ORAL at 02:50

## 2022-06-15 RX ADMIN — DEXTROSE MONOHYDRATE 25 G: 25 INJECTION, SOLUTION INTRAVENOUS at 07:49

## 2022-06-15 RX ADMIN — HYDRALAZINE HYDROCHLORIDE 100 MG: 50 TABLET, FILM COATED ORAL at 20:32

## 2022-06-15 RX ADMIN — CARVEDILOL 12.5 MG: 12.5 TABLET, FILM COATED ORAL at 07:51

## 2022-06-15 RX ADMIN — LOSARTAN POTASSIUM 100 MG: 100 TABLET, FILM COATED ORAL at 07:52

## 2022-06-15 RX ADMIN — PANTOPRAZOLE SODIUM 40 MG: 40 TABLET, DELAYED RELEASE ORAL at 06:10

## 2022-06-15 RX ADMIN — ATORVASTATIN CALCIUM 40 MG: 20 TABLET, FILM COATED ORAL at 16:16

## 2022-06-15 RX ADMIN — PROPOFOL 50 MCG/KG/MIN: 10 INJECTION, EMULSION INTRAVENOUS at 13:09

## 2022-06-15 RX ADMIN — GABAPENTIN 300 MG: 300 CAPSULE ORAL at 16:11

## 2022-06-15 NOTE — ANESTHESIA POSTPROCEDURE EVALUATION
Patient: Zaina Martinez    Procedure Summary     Date: 06/15/22 Room / Location: [facility] OR [address] [place] [address] MAIN OR    Anesthesia Start: 1301 Anesthesia Stop: 1337    Procedure: Left quadriceps muscle biopsy (Left Neck) Diagnosis:       Generalized weakness      (Generalized weakness [R53.1])    Surgeons: Marques Ma MD Provider: Darien Aldrich MD    Anesthesia Type: MAC ASA Status: 3          Anesthesia Type: MAC    Vitals  Vitals Value Taken Time   /70 06/15/22 1431   Temp 37.2 °C (99 °F) 06/15/22 1333   Pulse 57 06/15/22 1440   Resp 14 06/15/22 1430   SpO2 99 % 06/15/22 1440   Vitals shown include unvalidated device data.        Post Anesthesia Care and Evaluation    Patient location during evaluation: PACU  Patient participation: complete - patient participated  Level of consciousness: awake  Pain score: 1  Pain management: adequate  Anesthetic complications: No anesthetic complications  PONV Status: none  Cardiovascular status: acceptable  Respiratory status: acceptable  Hydration status: acceptable

## 2022-06-15 NOTE — ANESTHESIA PREPROCEDURE EVALUATION
Anesthesia Evaluation     Patient summary reviewed and Nursing notes reviewed                Airway   Mallampati: II  Dental - normal exam     Pulmonary - normal exam   (+) shortness of breath,   Cardiovascular - normal exam    ECG reviewed    (+) hypertension, past MI , CAD, CHF , hyperlipidemia,     ROS comment: Reviewed echo  · Estimated left ventricular EF = 65% Left ventricular systolic function is normal.  · Left ventricular diastolic function was not assessed.  · Saline test results are negative.  · Estimated right ventricular systolic pressure from tricuspid regurgitation is normal (<35 mmHg).  · No valvular vegetations seen.         Neuro/Psych  (+) CVA, psychiatric history Depression,    GI/Hepatic/Renal/Endo    (+)  GERD,  renal disease ESRD and dialysis, diabetes mellitus type 2, thyroid problem hypothyroidism    Musculoskeletal     Abdominal    Substance History      OB/GYN          Other   arthritis, autoimmune disease rheumatoid arthritis,                      Anesthesia Plan    ASA 3     MAC       Anesthetic plan, risks, benefits, and alternatives have been provided, discussed and informed consent has been obtained with: patient.        CODE STATUS:

## 2022-06-16 LAB
ALBUMIN SERPL-MCNC: 2.9 G/DL (ref 3.5–5.2)
ALBUMIN/GLOB SERPL: 0.8 G/DL
ALP SERPL-CCNC: 1028 U/L (ref 39–117)
ALT SERPL W P-5'-P-CCNC: 10 U/L (ref 1–33)
ANION GAP SERPL CALCULATED.3IONS-SCNC: 12 MMOL/L (ref 5–15)
AST SERPL-CCNC: 43 U/L (ref 1–32)
BASOPHILS # BLD AUTO: 0.05 10*3/MM3 (ref 0–0.2)
BASOPHILS NFR BLD AUTO: 0.4 % (ref 0–1.5)
BILIRUB CONJ SERPL-MCNC: 0.5 MG/DL (ref 0–0.3)
BILIRUB SERPL-MCNC: 0.8 MG/DL (ref 0–1.2)
BUN SERPL-MCNC: 26 MG/DL (ref 6–20)
BUN/CREAT SERPL: 7 (ref 7–25)
CALCIUM SPEC-SCNC: 8.3 MG/DL (ref 8.6–10.5)
CHLORIDE SERPL-SCNC: 92 MMOL/L (ref 98–107)
CO2 SERPL-SCNC: 23 MMOL/L (ref 22–29)
CREAT SERPL-MCNC: 3.7 MG/DL (ref 0.57–1)
DEPRECATED RDW RBC AUTO: 48.8 FL (ref 37–54)
EGFRCR SERPLBLD CKD-EPI 2021: 13.8 ML/MIN/1.73
EOSINOPHIL # BLD AUTO: 0.06 10*3/MM3 (ref 0–0.4)
EOSINOPHIL NFR BLD AUTO: 0.4 % (ref 0.3–6.2)
ERYTHROCYTE [DISTWIDTH] IN BLOOD BY AUTOMATED COUNT: 16.2 % (ref 12.3–15.4)
GGT SERPL-CCNC: 557 U/L (ref 5–36)
GLOBULIN UR ELPH-MCNC: 3.7 GM/DL
GLUCOSE BLDC GLUCOMTR-MCNC: 180 MG/DL (ref 70–130)
GLUCOSE BLDC GLUCOMTR-MCNC: 203 MG/DL (ref 70–130)
GLUCOSE BLDC GLUCOMTR-MCNC: 210 MG/DL (ref 70–130)
GLUCOSE BLDC GLUCOMTR-MCNC: 227 MG/DL (ref 70–130)
GLUCOSE SERPL-MCNC: 209 MG/DL (ref 65–99)
HCT VFR BLD AUTO: 26.9 % (ref 34–46.6)
HGB BLD-MCNC: 8.2 G/DL (ref 12–15.9)
IMM GRANULOCYTES # BLD AUTO: 0.09 10*3/MM3 (ref 0–0.05)
IMM GRANULOCYTES NFR BLD AUTO: 0.7 % (ref 0–0.5)
LYMPHOCYTES # BLD AUTO: 0.68 10*3/MM3 (ref 0.7–3.1)
LYMPHOCYTES NFR BLD AUTO: 5 % (ref 19.6–45.3)
MCH RBC QN AUTO: 25.5 PG (ref 26.6–33)
MCHC RBC AUTO-ENTMCNC: 30.5 G/DL (ref 31.5–35.7)
MCV RBC AUTO: 83.8 FL (ref 79–97)
MONOCYTES # BLD AUTO: 1.07 10*3/MM3 (ref 0.1–0.9)
MONOCYTES NFR BLD AUTO: 7.8 % (ref 5–12)
NEUTROPHILS NFR BLD AUTO: 11.74 10*3/MM3 (ref 1.7–7)
NEUTROPHILS NFR BLD AUTO: 85.7 % (ref 42.7–76)
NRBC BLD AUTO-RTO: 0 /100 WBC (ref 0–0.2)
PHOSPHATE SERPL-MCNC: 2.7 MG/DL (ref 2.5–4.5)
PLATELET # BLD AUTO: 355 10*3/MM3 (ref 140–450)
PMV BLD AUTO: 11.1 FL (ref 6–12)
POTASSIUM SERPL-SCNC: 4.9 MMOL/L (ref 3.5–5.2)
PROT SERPL-MCNC: 6.6 G/DL (ref 6–8.5)
RBC # BLD AUTO: 3.21 10*6/MM3 (ref 3.77–5.28)
SODIUM SERPL-SCNC: 127 MMOL/L (ref 136–145)
WBC NRBC COR # BLD: 13.69 10*3/MM3 (ref 3.4–10.8)

## 2022-06-16 PROCEDURE — 82248 BILIRUBIN DIRECT: CPT | Performed by: PHYSICAL MEDICINE & REHABILITATION

## 2022-06-16 PROCEDURE — 97130 THER IVNTJ EA ADDL 15 MIN: CPT

## 2022-06-16 PROCEDURE — 85025 COMPLETE CBC W/AUTO DIFF WBC: CPT | Performed by: PHYSICAL MEDICINE & REHABILITATION

## 2022-06-16 PROCEDURE — 84080 ASSAY ALKALINE PHOSPHATASES: CPT | Performed by: PHYSICIAN ASSISTANT

## 2022-06-16 PROCEDURE — 97110 THERAPEUTIC EXERCISES: CPT

## 2022-06-16 PROCEDURE — 99232 SBSQ HOSP IP/OBS MODERATE 35: CPT | Performed by: INTERNAL MEDICINE

## 2022-06-16 PROCEDURE — 80053 COMPREHEN METABOLIC PANEL: CPT | Performed by: INTERNAL MEDICINE

## 2022-06-16 PROCEDURE — 82962 GLUCOSE BLOOD TEST: CPT

## 2022-06-16 PROCEDURE — 84100 ASSAY OF PHOSPHORUS: CPT | Performed by: PHYSICAL MEDICINE & REHABILITATION

## 2022-06-16 PROCEDURE — 82977 ASSAY OF GGT: CPT | Performed by: INTERNAL MEDICINE

## 2022-06-16 PROCEDURE — 99231 SBSQ HOSP IP/OBS SF/LOW 25: CPT | Performed by: NURSE PRACTITIONER

## 2022-06-16 PROCEDURE — 97535 SELF CARE MNGMENT TRAINING: CPT

## 2022-06-16 PROCEDURE — 97129 THER IVNTJ 1ST 15 MIN: CPT

## 2022-06-16 PROCEDURE — 84075 ASSAY ALKALINE PHOSPHATASE: CPT | Performed by: PHYSICIAN ASSISTANT

## 2022-06-16 PROCEDURE — 63710000001 INSULIN LISPRO (HUMAN) PER 5 UNITS: Performed by: PHYSICAL MEDICINE & REHABILITATION

## 2022-06-16 PROCEDURE — 97116 GAIT TRAINING THERAPY: CPT

## 2022-06-16 RX ORDER — TRAMADOL HYDROCHLORIDE 50 MG/1
25 TABLET ORAL EVERY 4 HOURS PRN
Status: DISPENSED | OUTPATIENT
Start: 2022-06-16 | End: 2022-06-23

## 2022-06-16 RX ADMIN — ASPIRIN 81 MG: 81 TABLET, COATED ORAL at 07:48

## 2022-06-16 RX ADMIN — LOSARTAN POTASSIUM 100 MG: 100 TABLET, FILM COATED ORAL at 07:49

## 2022-06-16 RX ADMIN — OXYCODONE 5 MG: 5 TABLET ORAL at 12:10

## 2022-06-16 RX ADMIN — INSULIN LISPRO 3 UNITS: 100 INJECTION, SOLUTION INTRAVENOUS; SUBCUTANEOUS at 12:10

## 2022-06-16 RX ADMIN — AMLODIPINE BESYLATE 10 MG: 10 TABLET ORAL at 07:49

## 2022-06-16 RX ADMIN — TRAMADOL HYDROCHLORIDE 25 MG: 50 TABLET, COATED ORAL at 14:27

## 2022-06-16 RX ADMIN — INSULIN LISPRO 3 UNITS: 100 INJECTION, SOLUTION INTRAVENOUS; SUBCUTANEOUS at 07:58

## 2022-06-16 RX ADMIN — SERTRALINE 100 MG: 100 TABLET, FILM COATED ORAL at 07:49

## 2022-06-16 RX ADMIN — ACETAMINOPHEN 500 MG: 500 TABLET, FILM COATED ORAL at 09:52

## 2022-06-16 RX ADMIN — ACETAMINOPHEN 500 MG: 500 TABLET, FILM COATED ORAL at 21:28

## 2022-06-16 RX ADMIN — GABAPENTIN 300 MG: 300 CAPSULE ORAL at 21:28

## 2022-06-16 RX ADMIN — OXYCODONE 5 MG: 5 TABLET ORAL at 17:10

## 2022-06-16 RX ADMIN — TAMSULOSIN HYDROCHLORIDE 0.4 MG: 0.4 CAPSULE ORAL at 07:49

## 2022-06-16 RX ADMIN — LIDOCAINE 1 PATCH: 50 PATCH CUTANEOUS at 07:49

## 2022-06-16 RX ADMIN — HYDRALAZINE HYDROCHLORIDE 100 MG: 50 TABLET, FILM COATED ORAL at 14:27

## 2022-06-16 RX ADMIN — OXYCODONE 5 MG: 5 TABLET ORAL at 21:29

## 2022-06-16 RX ADMIN — PANTOPRAZOLE SODIUM 40 MG: 40 TABLET, DELAYED RELEASE ORAL at 05:31

## 2022-06-16 RX ADMIN — OXYCODONE 5 MG: 5 TABLET ORAL at 07:36

## 2022-06-16 RX ADMIN — TRAMADOL HYDROCHLORIDE 25 MG: 50 TABLET, COATED ORAL at 10:18

## 2022-06-16 RX ADMIN — HYDRALAZINE HYDROCHLORIDE 100 MG: 50 TABLET, FILM COATED ORAL at 05:31

## 2022-06-16 RX ADMIN — INSULIN LISPRO 3 UNITS: 100 INJECTION, SOLUTION INTRAVENOUS; SUBCUTANEOUS at 17:10

## 2022-06-16 RX ADMIN — HYDRALAZINE HYDROCHLORIDE 100 MG: 50 TABLET, FILM COATED ORAL at 21:28

## 2022-06-16 RX ADMIN — GABAPENTIN 300 MG: 300 CAPSULE ORAL at 07:49

## 2022-06-16 RX ADMIN — ATORVASTATIN CALCIUM 40 MG: 20 TABLET, FILM COATED ORAL at 07:48

## 2022-06-16 RX ADMIN — OXYCODONE 5 MG: 5 TABLET ORAL at 03:11

## 2022-06-16 RX ADMIN — TRAMADOL HYDROCHLORIDE 25 MG: 50 TABLET, COATED ORAL at 18:59

## 2022-06-16 RX ADMIN — ROPINIROLE HYDROCHLORIDE 0.75 MG: 0.5 TABLET, FILM COATED ORAL at 21:28

## 2022-06-16 RX ADMIN — Medication 1 TABLET: at 07:49

## 2022-06-16 RX ADMIN — INSULIN GLARGINE-YFGN 10 UNITS: 100 INJECTION, SOLUTION SUBCUTANEOUS at 12:11

## 2022-06-16 RX ADMIN — CARVEDILOL 12.5 MG: 12.5 TABLET, FILM COATED ORAL at 17:11

## 2022-06-16 RX ADMIN — LEVOTHYROXINE SODIUM 150 MCG: 0.15 TABLET ORAL at 05:31

## 2022-06-16 RX ADMIN — CARVEDILOL 12.5 MG: 12.5 TABLET, FILM COATED ORAL at 07:49

## 2022-06-17 LAB
ALBUMIN SERPL-MCNC: 2.8 G/DL (ref 3.5–5.2)
ALP BONE CFR SERPL: 39 % (ref 14–68)
ALP INTEST CFR SERPL: 4 % (ref 0–18)
ALP LIVER CFR SERPL: 57 % (ref 18–85)
ALP SERPL-CCNC: 1003 IU/L (ref 44–121)
ALP SERPL-CCNC: 1089 U/L (ref 39–117)
ALT SERPL W P-5'-P-CCNC: 5 U/L (ref 1–33)
ANION GAP SERPL CALCULATED.3IONS-SCNC: 12 MMOL/L (ref 5–15)
AST SERPL-CCNC: 49 U/L (ref 1–32)
BASOPHILS # BLD AUTO: 0.05 10*3/MM3 (ref 0–0.2)
BASOPHILS NFR BLD AUTO: 0.3 % (ref 0–1.5)
BILIRUB CONJ SERPL-MCNC: 0.5 MG/DL (ref 0–0.3)
BILIRUB INDIRECT SERPL-MCNC: 0.3 MG/DL
BILIRUB SERPL-MCNC: 0.8 MG/DL (ref 0–1.2)
BUN SERPL-MCNC: 37 MG/DL (ref 6–20)
BUN/CREAT SERPL: 8.4 (ref 7–25)
CALCIUM SPEC-SCNC: 8.3 MG/DL (ref 8.6–10.5)
CHLORIDE SERPL-SCNC: 91 MMOL/L (ref 98–107)
CO2 SERPL-SCNC: 22 MMOL/L (ref 22–29)
CREAT SERPL-MCNC: 4.43 MG/DL (ref 0.57–1)
DEPRECATED RDW RBC AUTO: 49.9 FL (ref 37–54)
EGFRCR SERPLBLD CKD-EPI 2021: 11.1 ML/MIN/1.73
EOSINOPHIL # BLD AUTO: 0.13 10*3/MM3 (ref 0–0.4)
EOSINOPHIL NFR BLD AUTO: 0.8 % (ref 0.3–6.2)
ERYTHROCYTE [DISTWIDTH] IN BLOOD BY AUTOMATED COUNT: 16.1 % (ref 12.3–15.4)
GLUCOSE BLDC GLUCOMTR-MCNC: 100 MG/DL (ref 70–130)
GLUCOSE BLDC GLUCOMTR-MCNC: 178 MG/DL (ref 70–130)
GLUCOSE BLDC GLUCOMTR-MCNC: 238 MG/DL (ref 70–130)
GLUCOSE SERPL-MCNC: 170 MG/DL (ref 65–99)
HCT VFR BLD AUTO: 28.3 % (ref 34–46.6)
HGB BLD-MCNC: 8.5 G/DL (ref 12–15.9)
IMM GRANULOCYTES # BLD AUTO: 0.12 10*3/MM3 (ref 0–0.05)
IMM GRANULOCYTES NFR BLD AUTO: 0.7 % (ref 0–0.5)
LYMPHOCYTES # BLD AUTO: 0.95 10*3/MM3 (ref 0.7–3.1)
LYMPHOCYTES NFR BLD AUTO: 5.8 % (ref 19.6–45.3)
MCH RBC QN AUTO: 25.8 PG (ref 26.6–33)
MCHC RBC AUTO-ENTMCNC: 30 G/DL (ref 31.5–35.7)
MCV RBC AUTO: 86 FL (ref 79–97)
MONOCYTES # BLD AUTO: 1.24 10*3/MM3 (ref 0.1–0.9)
MONOCYTES NFR BLD AUTO: 7.5 % (ref 5–12)
NEUTROPHILS NFR BLD AUTO: 13.95 10*3/MM3 (ref 1.7–7)
NEUTROPHILS NFR BLD AUTO: 84.9 % (ref 42.7–76)
NRBC BLD AUTO-RTO: 0 /100 WBC (ref 0–0.2)
PHOSPHATE SERPL-MCNC: 3.2 MG/DL (ref 2.5–4.5)
PLATELET # BLD AUTO: 375 10*3/MM3 (ref 140–450)
PMV BLD AUTO: 11 FL (ref 6–12)
POTASSIUM SERPL-SCNC: 5.7 MMOL/L (ref 3.5–5.2)
PROT SERPL-MCNC: 6.7 G/DL (ref 6–8.5)
RBC # BLD AUTO: 3.29 10*6/MM3 (ref 3.77–5.28)
SODIUM SERPL-SCNC: 125 MMOL/L (ref 136–145)
WBC NRBC COR # BLD: 16.44 10*3/MM3 (ref 3.4–10.8)

## 2022-06-17 PROCEDURE — 99232 SBSQ HOSP IP/OBS MODERATE 35: CPT | Performed by: NURSE PRACTITIONER

## 2022-06-17 PROCEDURE — 97129 THER IVNTJ 1ST 15 MIN: CPT

## 2022-06-17 PROCEDURE — 84100 ASSAY OF PHOSPHORUS: CPT | Performed by: PHYSICAL MEDICINE & REHABILITATION

## 2022-06-17 PROCEDURE — 97130 THER IVNTJ EA ADDL 15 MIN: CPT

## 2022-06-17 PROCEDURE — 97110 THERAPEUTIC EXERCISES: CPT

## 2022-06-17 PROCEDURE — 80048 BASIC METABOLIC PNL TOTAL CA: CPT | Performed by: PHYSICAL MEDICINE & REHABILITATION

## 2022-06-17 PROCEDURE — 63710000001 INSULIN LISPRO (HUMAN) PER 5 UNITS: Performed by: PHYSICAL MEDICINE & REHABILITATION

## 2022-06-17 PROCEDURE — 82962 GLUCOSE BLOOD TEST: CPT

## 2022-06-17 PROCEDURE — 80076 HEPATIC FUNCTION PANEL: CPT | Performed by: PHYSICAL MEDICINE & REHABILITATION

## 2022-06-17 PROCEDURE — 85025 COMPLETE CBC W/AUTO DIFF WBC: CPT | Performed by: PHYSICAL MEDICINE & REHABILITATION

## 2022-06-17 PROCEDURE — 97535 SELF CARE MNGMENT TRAINING: CPT | Performed by: OCCUPATIONAL THERAPIST

## 2022-06-17 PROCEDURE — 97110 THERAPEUTIC EXERCISES: CPT | Performed by: OCCUPATIONAL THERAPIST

## 2022-06-17 PROCEDURE — 25010000002 HEPARIN (PORCINE) PER 1000 UNITS: Performed by: PHYSICAL MEDICINE & REHABILITATION

## 2022-06-17 PROCEDURE — 25010000002 EPOETIN ALFA-EPBX 10000 UNIT/ML SOLUTION: Performed by: PHYSICAL MEDICINE & REHABILITATION

## 2022-06-17 RX ADMIN — OXYCODONE 5 MG: 5 TABLET ORAL at 02:58

## 2022-06-17 RX ADMIN — OXYCODONE 5 MG: 5 TABLET ORAL at 21:18

## 2022-06-17 RX ADMIN — GABAPENTIN 300 MG: 300 CAPSULE ORAL at 21:14

## 2022-06-17 RX ADMIN — ROPINIROLE HYDROCHLORIDE 0.75 MG: 0.5 TABLET, FILM COATED ORAL at 21:14

## 2022-06-17 RX ADMIN — CARVEDILOL 12.5 MG: 12.5 TABLET, FILM COATED ORAL at 21:14

## 2022-06-17 RX ADMIN — LIDOCAINE 1 PATCH: 50 PATCH CUTANEOUS at 07:33

## 2022-06-17 RX ADMIN — CARVEDILOL 12.5 MG: 12.5 TABLET, FILM COATED ORAL at 07:31

## 2022-06-17 RX ADMIN — AMLODIPINE BESYLATE 10 MG: 10 TABLET ORAL at 07:30

## 2022-06-17 RX ADMIN — ASPIRIN 81 MG: 81 TABLET, COATED ORAL at 07:31

## 2022-06-17 RX ADMIN — HYDRALAZINE HYDROCHLORIDE 100 MG: 50 TABLET, FILM COATED ORAL at 05:45

## 2022-06-17 RX ADMIN — LEVOTHYROXINE SODIUM 150 MCG: 0.15 TABLET ORAL at 05:45

## 2022-06-17 RX ADMIN — PANTOPRAZOLE SODIUM 40 MG: 40 TABLET, DELAYED RELEASE ORAL at 05:45

## 2022-06-17 RX ADMIN — TRAMADOL HYDROCHLORIDE 25 MG: 50 TABLET, COATED ORAL at 10:16

## 2022-06-17 RX ADMIN — ACETAMINOPHEN 500 MG: 500 TABLET, FILM COATED ORAL at 02:58

## 2022-06-17 RX ADMIN — LOSARTAN POTASSIUM 100 MG: 100 TABLET, FILM COATED ORAL at 07:29

## 2022-06-17 RX ADMIN — TAMSULOSIN HYDROCHLORIDE 0.4 MG: 0.4 CAPSULE ORAL at 07:30

## 2022-06-17 RX ADMIN — ATORVASTATIN CALCIUM 40 MG: 20 TABLET, FILM COATED ORAL at 07:31

## 2022-06-17 RX ADMIN — INSULIN GLARGINE-YFGN 10 UNITS: 100 INJECTION, SOLUTION SUBCUTANEOUS at 11:45

## 2022-06-17 RX ADMIN — OXYCODONE 5 MG: 5 TABLET ORAL at 12:33

## 2022-06-17 RX ADMIN — ACETAMINOPHEN 500 MG: 500 TABLET, FILM COATED ORAL at 13:36

## 2022-06-17 RX ADMIN — Medication 1 TABLET: at 07:30

## 2022-06-17 RX ADMIN — OXYCODONE 5 MG: 5 TABLET ORAL at 07:37

## 2022-06-17 RX ADMIN — INSULIN LISPRO 3 UNITS: 100 INJECTION, SOLUTION INTRAVENOUS; SUBCUTANEOUS at 11:44

## 2022-06-17 RX ADMIN — HYDRALAZINE HYDROCHLORIDE 100 MG: 50 TABLET, FILM COATED ORAL at 23:38

## 2022-06-17 RX ADMIN — INSULIN LISPRO 2 UNITS: 100 INJECTION, SOLUTION INTRAVENOUS; SUBCUTANEOUS at 07:31

## 2022-06-17 RX ADMIN — SERTRALINE 100 MG: 100 TABLET, FILM COATED ORAL at 07:30

## 2022-06-17 RX ADMIN — EPOETIN ALFA-EPBX 10000 UNITS: 10000 INJECTION, SOLUTION INTRAVENOUS; SUBCUTANEOUS at 17:59

## 2022-06-17 RX ADMIN — TRAMADOL HYDROCHLORIDE 25 MG: 50 TABLET, COATED ORAL at 05:45

## 2022-06-17 RX ADMIN — TRAMADOL HYDROCHLORIDE 25 MG: 50 TABLET, COATED ORAL at 15:22

## 2022-06-17 RX ADMIN — HEPARIN SODIUM 3800 UNITS: 1000 INJECTION INTRAVENOUS; SUBCUTANEOUS at 18:00

## 2022-06-17 RX ADMIN — GABAPENTIN 300 MG: 300 CAPSULE ORAL at 07:32

## 2022-06-18 ENCOUNTER — APPOINTMENT (OUTPATIENT)
Dept: MRI IMAGING | Facility: HOSPITAL | Age: 57
End: 2022-06-18

## 2022-06-18 LAB
ALBUMIN SERPL-MCNC: 2.8 G/DL (ref 3.5–5.2)
ALP SERPL-CCNC: 969 U/L (ref 39–117)
ALT SERPL W P-5'-P-CCNC: <5 U/L (ref 1–33)
ANION GAP SERPL CALCULATED.3IONS-SCNC: 11 MMOL/L (ref 5–15)
AST SERPL-CCNC: 43 U/L (ref 1–32)
BASOPHILS # BLD AUTO: 0.05 10*3/MM3 (ref 0–0.2)
BASOPHILS NFR BLD AUTO: 0.3 % (ref 0–1.5)
BILIRUB CONJ SERPL-MCNC: 0.5 MG/DL (ref 0–0.3)
BILIRUB INDIRECT SERPL-MCNC: 0.3 MG/DL
BILIRUB SERPL-MCNC: 0.8 MG/DL (ref 0–1.2)
BUN SERPL-MCNC: 17 MG/DL (ref 6–20)
BUN/CREAT SERPL: 5.9 (ref 7–25)
CALCIUM SPEC-SCNC: 8.2 MG/DL (ref 8.6–10.5)
CHLORIDE SERPL-SCNC: 96 MMOL/L (ref 98–107)
CO2 SERPL-SCNC: 24 MMOL/L (ref 22–29)
CREAT SERPL-MCNC: 2.88 MG/DL (ref 0.57–1)
DEPRECATED RDW RBC AUTO: 47.8 FL (ref 37–54)
EGFRCR SERPLBLD CKD-EPI 2021: 18.6 ML/MIN/1.73
EOSINOPHIL # BLD AUTO: 0.03 10*3/MM3 (ref 0–0.4)
EOSINOPHIL NFR BLD AUTO: 0.2 % (ref 0.3–6.2)
ERYTHROCYTE [DISTWIDTH] IN BLOOD BY AUTOMATED COUNT: 16.3 % (ref 12.3–15.4)
GLUCOSE BLDC GLUCOMTR-MCNC: 118 MG/DL (ref 70–130)
GLUCOSE BLDC GLUCOMTR-MCNC: 121 MG/DL (ref 70–130)
GLUCOSE BLDC GLUCOMTR-MCNC: 129 MG/DL (ref 70–130)
GLUCOSE BLDC GLUCOMTR-MCNC: 163 MG/DL (ref 70–130)
GLUCOSE BLDC GLUCOMTR-MCNC: 195 MG/DL (ref 70–130)
GLUCOSE BLDC GLUCOMTR-MCNC: 66 MG/DL (ref 70–130)
GLUCOSE SERPL-MCNC: 65 MG/DL (ref 65–99)
HCT VFR BLD AUTO: 26.9 % (ref 34–46.6)
HGB BLD-MCNC: 8.5 G/DL (ref 12–15.9)
IMM GRANULOCYTES # BLD AUTO: 0.14 10*3/MM3 (ref 0–0.05)
IMM GRANULOCYTES NFR BLD AUTO: 0.9 % (ref 0–0.5)
LYMPHOCYTES # BLD AUTO: 0.86 10*3/MM3 (ref 0.7–3.1)
LYMPHOCYTES NFR BLD AUTO: 5.7 % (ref 19.6–45.3)
MCH RBC QN AUTO: 25.9 PG (ref 26.6–33)
MCHC RBC AUTO-ENTMCNC: 31.6 G/DL (ref 31.5–35.7)
MCV RBC AUTO: 82 FL (ref 79–97)
MONOCYTES # BLD AUTO: 1.27 10*3/MM3 (ref 0.1–0.9)
MONOCYTES NFR BLD AUTO: 8.4 % (ref 5–12)
NEUTROPHILS NFR BLD AUTO: 12.82 10*3/MM3 (ref 1.7–7)
NEUTROPHILS NFR BLD AUTO: 84.5 % (ref 42.7–76)
NRBC BLD AUTO-RTO: 0.1 /100 WBC (ref 0–0.2)
PHOSPHATE SERPL-MCNC: 2.4 MG/DL (ref 2.5–4.5)
PLATELET # BLD AUTO: 395 10*3/MM3 (ref 140–450)
PMV BLD AUTO: 10.6 FL (ref 6–12)
POTASSIUM SERPL-SCNC: 4.2 MMOL/L (ref 3.5–5.2)
PROT SERPL-MCNC: 6.4 G/DL (ref 6–8.5)
RBC # BLD AUTO: 3.28 10*6/MM3 (ref 3.77–5.28)
SODIUM SERPL-SCNC: 131 MMOL/L (ref 136–145)
WBC NRBC COR # BLD: 15.17 10*3/MM3 (ref 3.4–10.8)

## 2022-06-18 PROCEDURE — 74181 MRI ABDOMEN W/O CONTRAST: CPT

## 2022-06-18 PROCEDURE — 99232 SBSQ HOSP IP/OBS MODERATE 35: CPT | Performed by: INTERNAL MEDICINE

## 2022-06-18 PROCEDURE — 84100 ASSAY OF PHOSPHORUS: CPT | Performed by: PHYSICAL MEDICINE & REHABILITATION

## 2022-06-18 PROCEDURE — 97130 THER IVNTJ EA ADDL 15 MIN: CPT

## 2022-06-18 PROCEDURE — 80076 HEPATIC FUNCTION PANEL: CPT | Performed by: PHYSICAL MEDICINE & REHABILITATION

## 2022-06-18 PROCEDURE — 97535 SELF CARE MNGMENT TRAINING: CPT

## 2022-06-18 PROCEDURE — 63710000001 INSULIN LISPRO (HUMAN) PER 5 UNITS: Performed by: PHYSICAL MEDICINE & REHABILITATION

## 2022-06-18 PROCEDURE — 97110 THERAPEUTIC EXERCISES: CPT

## 2022-06-18 PROCEDURE — 82962 GLUCOSE BLOOD TEST: CPT

## 2022-06-18 PROCEDURE — 80048 BASIC METABOLIC PNL TOTAL CA: CPT | Performed by: INTERNAL MEDICINE

## 2022-06-18 PROCEDURE — 85025 COMPLETE CBC W/AUTO DIFF WBC: CPT | Performed by: PHYSICAL MEDICINE & REHABILITATION

## 2022-06-18 PROCEDURE — 97129 THER IVNTJ 1ST 15 MIN: CPT

## 2022-06-18 RX ORDER — OXYCODONE HYDROCHLORIDE 5 MG/1
5 TABLET ORAL EVERY 4 HOURS PRN
Status: DISCONTINUED | OUTPATIENT
Start: 2022-06-18 | End: 2022-06-19

## 2022-06-18 RX ADMIN — DOCUSATE SODIUM 50MG AND SENNOSIDES 8.6MG 1 TABLET: 8.6; 5 TABLET, FILM COATED ORAL at 10:11

## 2022-06-18 RX ADMIN — AMLODIPINE BESYLATE 10 MG: 10 TABLET ORAL at 08:22

## 2022-06-18 RX ADMIN — OXYCODONE 5 MG: 5 TABLET ORAL at 18:36

## 2022-06-18 RX ADMIN — CARVEDILOL 12.5 MG: 12.5 TABLET, FILM COATED ORAL at 08:22

## 2022-06-18 RX ADMIN — INSULIN GLARGINE-YFGN 10 UNITS: 100 INJECTION, SOLUTION SUBCUTANEOUS at 11:55

## 2022-06-18 RX ADMIN — SERTRALINE 100 MG: 100 TABLET, FILM COATED ORAL at 08:22

## 2022-06-18 RX ADMIN — TRAMADOL HYDROCHLORIDE 25 MG: 50 TABLET, COATED ORAL at 16:12

## 2022-06-18 RX ADMIN — ROPINIROLE HYDROCHLORIDE 0.75 MG: 0.5 TABLET, FILM COATED ORAL at 22:46

## 2022-06-18 RX ADMIN — CARVEDILOL 12.5 MG: 12.5 TABLET, FILM COATED ORAL at 17:14

## 2022-06-18 RX ADMIN — DOCUSATE SODIUM 50MG AND SENNOSIDES 8.6MG 1 TABLET: 8.6; 5 TABLET, FILM COATED ORAL at 22:52

## 2022-06-18 RX ADMIN — OXYCODONE 5 MG: 5 TABLET ORAL at 13:41

## 2022-06-18 RX ADMIN — OXYCODONE 5 MG: 5 TABLET ORAL at 06:05

## 2022-06-18 RX ADMIN — GABAPENTIN 300 MG: 300 CAPSULE ORAL at 08:25

## 2022-06-18 RX ADMIN — ASPIRIN 81 MG: 81 TABLET, COATED ORAL at 08:26

## 2022-06-18 RX ADMIN — Medication 1 TABLET: at 08:22

## 2022-06-18 RX ADMIN — PANTOPRAZOLE SODIUM 40 MG: 40 TABLET, DELAYED RELEASE ORAL at 08:23

## 2022-06-18 RX ADMIN — HYDRALAZINE HYDROCHLORIDE 100 MG: 50 TABLET, FILM COATED ORAL at 22:46

## 2022-06-18 RX ADMIN — TAMSULOSIN HYDROCHLORIDE 0.4 MG: 0.4 CAPSULE ORAL at 08:22

## 2022-06-18 RX ADMIN — HYDRALAZINE HYDROCHLORIDE 100 MG: 50 TABLET, FILM COATED ORAL at 13:43

## 2022-06-18 RX ADMIN — ATORVASTATIN CALCIUM 40 MG: 20 TABLET, FILM COATED ORAL at 08:26

## 2022-06-18 RX ADMIN — HYDRALAZINE HYDROCHLORIDE 100 MG: 50 TABLET, FILM COATED ORAL at 06:06

## 2022-06-18 RX ADMIN — LOSARTAN POTASSIUM 100 MG: 100 TABLET, FILM COATED ORAL at 08:23

## 2022-06-18 RX ADMIN — GABAPENTIN 300 MG: 300 CAPSULE ORAL at 22:46

## 2022-06-18 RX ADMIN — TRAMADOL HYDROCHLORIDE 25 MG: 50 TABLET, COATED ORAL at 10:05

## 2022-06-18 RX ADMIN — LEVOTHYROXINE SODIUM 150 MCG: 0.15 TABLET ORAL at 06:06

## 2022-06-18 RX ADMIN — LIDOCAINE 1 PATCH: 50 PATCH CUTANEOUS at 08:22

## 2022-06-18 RX ADMIN — INSULIN LISPRO 2 UNITS: 100 INJECTION, SOLUTION INTRAVENOUS; SUBCUTANEOUS at 17:15

## 2022-06-19 LAB
ALBUMIN SERPL-MCNC: 2.5 G/DL (ref 3.5–5.2)
ALP SERPL-CCNC: 837 U/L (ref 39–117)
ALT SERPL W P-5'-P-CCNC: <5 U/L (ref 1–33)
ANION GAP SERPL CALCULATED.3IONS-SCNC: 12 MMOL/L (ref 5–15)
AST SERPL-CCNC: 39 U/L (ref 1–32)
BASOPHILS # BLD AUTO: 0.05 10*3/MM3 (ref 0–0.2)
BASOPHILS NFR BLD AUTO: 0.3 % (ref 0–1.5)
BILIRUB CONJ SERPL-MCNC: 0.4 MG/DL (ref 0–0.3)
BILIRUB INDIRECT SERPL-MCNC: 0.3 MG/DL
BILIRUB SERPL-MCNC: 0.7 MG/DL (ref 0–1.2)
BUN SERPL-MCNC: 25 MG/DL (ref 6–20)
BUN/CREAT SERPL: 6.9 (ref 7–25)
CALCIUM SPEC-SCNC: 8.4 MG/DL (ref 8.6–10.5)
CHLORIDE SERPL-SCNC: 94 MMOL/L (ref 98–107)
CO2 SERPL-SCNC: 23 MMOL/L (ref 22–29)
CREAT SERPL-MCNC: 3.64 MG/DL (ref 0.57–1)
DEPRECATED RDW RBC AUTO: 47.7 FL (ref 37–54)
EGFRCR SERPLBLD CKD-EPI 2021: 14.1 ML/MIN/1.73
EOSINOPHIL # BLD AUTO: 0.1 10*3/MM3 (ref 0–0.4)
EOSINOPHIL NFR BLD AUTO: 0.6 % (ref 0.3–6.2)
ERYTHROCYTE [DISTWIDTH] IN BLOOD BY AUTOMATED COUNT: 16.2 % (ref 12.3–15.4)
GLUCOSE BLDC GLUCOMTR-MCNC: 157 MG/DL (ref 70–130)
GLUCOSE BLDC GLUCOMTR-MCNC: 178 MG/DL (ref 70–130)
GLUCOSE BLDC GLUCOMTR-MCNC: 259 MG/DL (ref 70–130)
GLUCOSE BLDC GLUCOMTR-MCNC: 42 MG/DL (ref 70–130)
GLUCOSE BLDC GLUCOMTR-MCNC: 62 MG/DL (ref 70–130)
GLUCOSE BLDC GLUCOMTR-MCNC: 78 MG/DL (ref 70–130)
GLUCOSE BLDC GLUCOMTR-MCNC: 81 MG/DL (ref 70–130)
GLUCOSE SERPL-MCNC: 89 MG/DL (ref 65–99)
HCT VFR BLD AUTO: 25.2 % (ref 34–46.6)
HGB BLD-MCNC: 8.2 G/DL (ref 12–15.9)
IMM GRANULOCYTES # BLD AUTO: 0.16 10*3/MM3 (ref 0–0.05)
IMM GRANULOCYTES NFR BLD AUTO: 1 % (ref 0–0.5)
LYMPHOCYTES # BLD AUTO: 1.08 10*3/MM3 (ref 0.7–3.1)
LYMPHOCYTES NFR BLD AUTO: 6.6 % (ref 19.6–45.3)
MCH RBC QN AUTO: 26.5 PG (ref 26.6–33)
MCHC RBC AUTO-ENTMCNC: 32.5 G/DL (ref 31.5–35.7)
MCV RBC AUTO: 81.6 FL (ref 79–97)
MONOCYTES # BLD AUTO: 1.31 10*3/MM3 (ref 0.1–0.9)
MONOCYTES NFR BLD AUTO: 8 % (ref 5–12)
NEUTROPHILS NFR BLD AUTO: 13.64 10*3/MM3 (ref 1.7–7)
NEUTROPHILS NFR BLD AUTO: 83.5 % (ref 42.7–76)
NRBC BLD AUTO-RTO: 0 /100 WBC (ref 0–0.2)
PHOSPHATE SERPL-MCNC: 3 MG/DL (ref 2.5–4.5)
PLATELET # BLD AUTO: 398 10*3/MM3 (ref 140–450)
PMV BLD AUTO: 10.5 FL (ref 6–12)
POTASSIUM SERPL-SCNC: 4.5 MMOL/L (ref 3.5–5.2)
PROT SERPL-MCNC: 6.1 G/DL (ref 6–8.5)
RBC # BLD AUTO: 3.09 10*6/MM3 (ref 3.77–5.28)
SODIUM SERPL-SCNC: 129 MMOL/L (ref 136–145)
WBC NRBC COR # BLD: 16.34 10*3/MM3 (ref 3.4–10.8)

## 2022-06-19 PROCEDURE — 82962 GLUCOSE BLOOD TEST: CPT

## 2022-06-19 PROCEDURE — 63710000001 INSULIN LISPRO (HUMAN) PER 5 UNITS: Performed by: PHYSICAL MEDICINE & REHABILITATION

## 2022-06-19 PROCEDURE — 99232 SBSQ HOSP IP/OBS MODERATE 35: CPT | Performed by: INTERNAL MEDICINE

## 2022-06-19 PROCEDURE — 85025 COMPLETE CBC W/AUTO DIFF WBC: CPT | Performed by: PHYSICAL MEDICINE & REHABILITATION

## 2022-06-19 PROCEDURE — 84100 ASSAY OF PHOSPHORUS: CPT | Performed by: INTERNAL MEDICINE

## 2022-06-19 PROCEDURE — 80076 HEPATIC FUNCTION PANEL: CPT | Performed by: PHYSICAL MEDICINE & REHABILITATION

## 2022-06-19 PROCEDURE — 80048 BASIC METABOLIC PNL TOTAL CA: CPT | Performed by: INTERNAL MEDICINE

## 2022-06-19 RX ORDER — POLYETHYLENE GLYCOL 3350 17 G/17G
17 POWDER, FOR SOLUTION ORAL 2 TIMES DAILY
Status: DISCONTINUED | OUTPATIENT
Start: 2022-06-19 | End: 2022-07-01

## 2022-06-19 RX ORDER — OXYCODONE HYDROCHLORIDE 5 MG/1
2.5 TABLET ORAL EVERY 6 HOURS PRN
Status: DISPENSED | OUTPATIENT
Start: 2022-06-19 | End: 2022-06-26

## 2022-06-19 RX ADMIN — DOCUSATE SODIUM 50MG AND SENNOSIDES 8.6MG 1 TABLET: 8.6; 5 TABLET, FILM COATED ORAL at 09:08

## 2022-06-19 RX ADMIN — POLYETHYLENE GLYCOL 3350 17 G: 17 POWDER, FOR SOLUTION ORAL at 21:01

## 2022-06-19 RX ADMIN — LIDOCAINE 1 PATCH: 50 PATCH CUTANEOUS at 09:08

## 2022-06-19 RX ADMIN — CARVEDILOL 12.5 MG: 12.5 TABLET, FILM COATED ORAL at 09:08

## 2022-06-19 RX ADMIN — OXYCODONE 5 MG: 5 TABLET ORAL at 07:21

## 2022-06-19 RX ADMIN — TRAMADOL HYDROCHLORIDE 25 MG: 50 TABLET, COATED ORAL at 12:16

## 2022-06-19 RX ADMIN — PANTOPRAZOLE SODIUM 40 MG: 40 TABLET, DELAYED RELEASE ORAL at 05:36

## 2022-06-19 RX ADMIN — Medication 1 TABLET: at 09:08

## 2022-06-19 RX ADMIN — LEVOTHYROXINE SODIUM 150 MCG: 0.15 TABLET ORAL at 05:36

## 2022-06-19 RX ADMIN — TRAMADOL HYDROCHLORIDE 25 MG: 50 TABLET, COATED ORAL at 04:22

## 2022-06-19 RX ADMIN — HYDRALAZINE HYDROCHLORIDE 100 MG: 50 TABLET, FILM COATED ORAL at 05:36

## 2022-06-19 RX ADMIN — INSULIN LISPRO 2 UNITS: 100 INJECTION, SOLUTION INTRAVENOUS; SUBCUTANEOUS at 16:55

## 2022-06-19 RX ADMIN — CARVEDILOL 12.5 MG: 12.5 TABLET, FILM COATED ORAL at 16:52

## 2022-06-19 RX ADMIN — TRAMADOL HYDROCHLORIDE 25 MG: 50 TABLET, COATED ORAL at 21:02

## 2022-06-19 RX ADMIN — DOCUSATE SODIUM 50MG AND SENNOSIDES 8.6MG 1 TABLET: 8.6; 5 TABLET, FILM COATED ORAL at 21:01

## 2022-06-19 RX ADMIN — GABAPENTIN 300 MG: 300 CAPSULE ORAL at 21:01

## 2022-06-19 RX ADMIN — TAMSULOSIN HYDROCHLORIDE 0.4 MG: 0.4 CAPSULE ORAL at 09:09

## 2022-06-19 RX ADMIN — SERTRALINE 100 MG: 100 TABLET, FILM COATED ORAL at 09:09

## 2022-06-19 RX ADMIN — HYDRALAZINE HYDROCHLORIDE 100 MG: 50 TABLET, FILM COATED ORAL at 21:01

## 2022-06-19 RX ADMIN — OXYCODONE 2.5 MG: 5 TABLET ORAL at 16:59

## 2022-06-19 RX ADMIN — LOSARTAN POTASSIUM 100 MG: 100 TABLET, FILM COATED ORAL at 09:08

## 2022-06-19 RX ADMIN — HYDRALAZINE HYDROCHLORIDE 100 MG: 50 TABLET, FILM COATED ORAL at 13:19

## 2022-06-19 RX ADMIN — ROPINIROLE HYDROCHLORIDE 0.75 MG: 0.5 TABLET, FILM COATED ORAL at 21:01

## 2022-06-19 RX ADMIN — GABAPENTIN 300 MG: 300 CAPSULE ORAL at 09:08

## 2022-06-19 RX ADMIN — ATORVASTATIN CALCIUM 40 MG: 20 TABLET, FILM COATED ORAL at 09:07

## 2022-06-19 RX ADMIN — ASPIRIN 81 MG: 81 TABLET, COATED ORAL at 09:08

## 2022-06-19 RX ADMIN — AMLODIPINE BESYLATE 10 MG: 10 TABLET ORAL at 09:08

## 2022-06-19 RX ADMIN — BISACODYL 10 MG: 10 SUPPOSITORY RECTAL at 16:10

## 2022-06-20 LAB
ALBUMIN SERPL-MCNC: 2.6 G/DL (ref 3.5–5.2)
ALP SERPL-CCNC: 845 U/L (ref 39–117)
ALT SERPL W P-5'-P-CCNC: <5 U/L (ref 1–33)
ANION GAP SERPL CALCULATED.3IONS-SCNC: 12 MMOL/L (ref 5–15)
AST SERPL-CCNC: 35 U/L (ref 1–32)
BASOPHILS # BLD AUTO: 0.06 10*3/MM3 (ref 0–0.2)
BASOPHILS NFR BLD AUTO: 0.3 % (ref 0–1.5)
BILIRUB CONJ SERPL-MCNC: 0.4 MG/DL (ref 0–0.3)
BILIRUB INDIRECT SERPL-MCNC: 0.2 MG/DL
BILIRUB SERPL-MCNC: 0.6 MG/DL (ref 0–1.2)
BUN SERPL-MCNC: 38 MG/DL (ref 6–20)
BUN/CREAT SERPL: 8.4 (ref 7–25)
CALCIUM SPEC-SCNC: 7.9 MG/DL (ref 8.6–10.5)
CHLORIDE SERPL-SCNC: 93 MMOL/L (ref 98–107)
CO2 SERPL-SCNC: 22 MMOL/L (ref 22–29)
CREAT SERPL-MCNC: 4.53 MG/DL (ref 0.57–1)
DEPRECATED RDW RBC AUTO: 48.8 FL (ref 37–54)
EGFRCR SERPLBLD CKD-EPI 2021: 10.8 ML/MIN/1.73
EOSINOPHIL # BLD AUTO: 0.04 10*3/MM3 (ref 0–0.4)
EOSINOPHIL NFR BLD AUTO: 0.2 % (ref 0.3–6.2)
ERYTHROCYTE [DISTWIDTH] IN BLOOD BY AUTOMATED COUNT: 16.5 % (ref 12.3–15.4)
GLUCOSE BLDC GLUCOMTR-MCNC: 133 MG/DL (ref 70–130)
GLUCOSE BLDC GLUCOMTR-MCNC: 191 MG/DL (ref 70–130)
GLUCOSE BLDC GLUCOMTR-MCNC: 192 MG/DL (ref 70–130)
GLUCOSE BLDC GLUCOMTR-MCNC: 329 MG/DL (ref 70–130)
GLUCOSE SERPL-MCNC: 227 MG/DL (ref 65–99)
HCT VFR BLD AUTO: 25.1 % (ref 34–46.6)
HGB BLD-MCNC: 7.7 G/DL (ref 12–15.9)
IMM GRANULOCYTES # BLD AUTO: 0.12 10*3/MM3 (ref 0–0.05)
IMM GRANULOCYTES NFR BLD AUTO: 0.7 % (ref 0–0.5)
LYMPHOCYTES # BLD AUTO: 1.03 10*3/MM3 (ref 0.7–3.1)
LYMPHOCYTES NFR BLD AUTO: 5.6 % (ref 19.6–45.3)
MCH RBC QN AUTO: 25.4 PG (ref 26.6–33)
MCHC RBC AUTO-ENTMCNC: 30.7 G/DL (ref 31.5–35.7)
MCV RBC AUTO: 82.8 FL (ref 79–97)
MONOCYTES # BLD AUTO: 1.1 10*3/MM3 (ref 0.1–0.9)
MONOCYTES NFR BLD AUTO: 6 % (ref 5–12)
NEUTROPHILS NFR BLD AUTO: 16.04 10*3/MM3 (ref 1.7–7)
NEUTROPHILS NFR BLD AUTO: 87.2 % (ref 42.7–76)
NRBC BLD AUTO-RTO: 0 /100 WBC (ref 0–0.2)
PHOSPHATE SERPL-MCNC: 3.4 MG/DL (ref 2.5–4.5)
PLATELET # BLD AUTO: 423 10*3/MM3 (ref 140–450)
PMV BLD AUTO: 10.6 FL (ref 6–12)
POTASSIUM SERPL-SCNC: 5 MMOL/L (ref 3.5–5.2)
PROT SERPL-MCNC: 6 G/DL (ref 6–8.5)
RBC # BLD AUTO: 3.03 10*6/MM3 (ref 3.77–5.28)
SODIUM SERPL-SCNC: 127 MMOL/L (ref 136–145)
WBC NRBC COR # BLD: 18.39 10*3/MM3 (ref 3.4–10.8)

## 2022-06-20 PROCEDURE — 97130 THER IVNTJ EA ADDL 15 MIN: CPT

## 2022-06-20 PROCEDURE — 82962 GLUCOSE BLOOD TEST: CPT

## 2022-06-20 PROCEDURE — 63710000001 PREDNISONE PER 5 MG: Performed by: NURSE PRACTITIONER

## 2022-06-20 PROCEDURE — 25010000002 EPOETIN ALFA-EPBX 10000 UNIT/ML SOLUTION: Performed by: PHYSICAL MEDICINE & REHABILITATION

## 2022-06-20 PROCEDURE — 25010000002 HEPARIN (PORCINE) PER 1000 UNITS: Performed by: PHYSICAL MEDICINE & REHABILITATION

## 2022-06-20 PROCEDURE — 97110 THERAPEUTIC EXERCISES: CPT

## 2022-06-20 PROCEDURE — 80076 HEPATIC FUNCTION PANEL: CPT | Performed by: PHYSICAL MEDICINE & REHABILITATION

## 2022-06-20 PROCEDURE — 63710000001 INSULIN LISPRO (HUMAN) PER 5 UNITS: Performed by: PHYSICAL MEDICINE & REHABILITATION

## 2022-06-20 PROCEDURE — 99232 SBSQ HOSP IP/OBS MODERATE 35: CPT | Performed by: PHYSICIAN ASSISTANT

## 2022-06-20 PROCEDURE — 85025 COMPLETE CBC W/AUTO DIFF WBC: CPT | Performed by: PHYSICAL MEDICINE & REHABILITATION

## 2022-06-20 PROCEDURE — 97535 SELF CARE MNGMENT TRAINING: CPT

## 2022-06-20 PROCEDURE — 84100 ASSAY OF PHOSPHORUS: CPT | Performed by: INTERNAL MEDICINE

## 2022-06-20 PROCEDURE — 80048 BASIC METABOLIC PNL TOTAL CA: CPT | Performed by: INTERNAL MEDICINE

## 2022-06-20 PROCEDURE — 97129 THER IVNTJ 1ST 15 MIN: CPT

## 2022-06-20 RX ADMIN — OXYCODONE 2.5 MG: 5 TABLET ORAL at 18:53

## 2022-06-20 RX ADMIN — INSULIN GLARGINE-YFGN 6 UNITS: 100 INJECTION, SOLUTION SUBCUTANEOUS at 12:28

## 2022-06-20 RX ADMIN — AMLODIPINE BESYLATE 10 MG: 10 TABLET ORAL at 09:15

## 2022-06-20 RX ADMIN — Medication 1 TABLET: at 09:15

## 2022-06-20 RX ADMIN — HYDRALAZINE HYDROCHLORIDE 100 MG: 50 TABLET, FILM COATED ORAL at 21:35

## 2022-06-20 RX ADMIN — TRAMADOL HYDROCHLORIDE 25 MG: 50 TABLET, COATED ORAL at 12:30

## 2022-06-20 RX ADMIN — SERTRALINE 100 MG: 100 TABLET, FILM COATED ORAL at 09:15

## 2022-06-20 RX ADMIN — PANTOPRAZOLE SODIUM 40 MG: 40 TABLET, DELAYED RELEASE ORAL at 06:19

## 2022-06-20 RX ADMIN — POLYETHYLENE GLYCOL 3350 17 G: 17 POWDER, FOR SOLUTION ORAL at 09:16

## 2022-06-20 RX ADMIN — DOCUSATE SODIUM 50MG AND SENNOSIDES 8.6MG 1 TABLET: 8.6; 5 TABLET, FILM COATED ORAL at 09:15

## 2022-06-20 RX ADMIN — ROPINIROLE HYDROCHLORIDE 0.75 MG: 0.5 TABLET, FILM COATED ORAL at 21:36

## 2022-06-20 RX ADMIN — LEVOTHYROXINE SODIUM 150 MCG: 0.15 TABLET ORAL at 06:19

## 2022-06-20 RX ADMIN — GABAPENTIN 300 MG: 300 CAPSULE ORAL at 21:35

## 2022-06-20 RX ADMIN — CARVEDILOL 12.5 MG: 12.5 TABLET, FILM COATED ORAL at 09:15

## 2022-06-20 RX ADMIN — OXYCODONE 2.5 MG: 5 TABLET ORAL at 02:12

## 2022-06-20 RX ADMIN — TAMSULOSIN HYDROCHLORIDE 0.4 MG: 0.4 CAPSULE ORAL at 09:15

## 2022-06-20 RX ADMIN — TRAMADOL HYDROCHLORIDE 25 MG: 50 TABLET, COATED ORAL at 21:35

## 2022-06-20 RX ADMIN — GABAPENTIN 300 MG: 300 CAPSULE ORAL at 09:15

## 2022-06-20 RX ADMIN — HEPARIN SODIUM 3800 UNITS: 1000 INJECTION INTRAVENOUS; SUBCUTANEOUS at 15:15

## 2022-06-20 RX ADMIN — EPOETIN ALFA-EPBX 10000 UNITS: 10000 INJECTION, SOLUTION INTRAVENOUS; SUBCUTANEOUS at 15:16

## 2022-06-20 RX ADMIN — HYDRALAZINE HYDROCHLORIDE 100 MG: 50 TABLET, FILM COATED ORAL at 06:19

## 2022-06-20 RX ADMIN — TRAMADOL HYDROCHLORIDE 25 MG: 50 TABLET, COATED ORAL at 02:19

## 2022-06-20 RX ADMIN — OXYCODONE 2.5 MG: 5 TABLET ORAL at 09:14

## 2022-06-20 RX ADMIN — TRAMADOL HYDROCHLORIDE 25 MG: 50 TABLET, COATED ORAL at 06:19

## 2022-06-20 RX ADMIN — CARVEDILOL 12.5 MG: 12.5 TABLET, FILM COATED ORAL at 18:52

## 2022-06-20 RX ADMIN — ASPIRIN 81 MG: 81 TABLET, COATED ORAL at 09:15

## 2022-06-20 RX ADMIN — LIDOCAINE 1 PATCH: 50 PATCH CUTANEOUS at 09:16

## 2022-06-20 RX ADMIN — LOSARTAN POTASSIUM 100 MG: 100 TABLET, FILM COATED ORAL at 09:15

## 2022-06-20 RX ADMIN — ATORVASTATIN CALCIUM 40 MG: 20 TABLET, FILM COATED ORAL at 09:15

## 2022-06-20 RX ADMIN — PREDNISONE 60 MG: 50 TABLET ORAL at 12:28

## 2022-06-20 RX ADMIN — INSULIN LISPRO 2 UNITS: 100 INJECTION, SOLUTION INTRAVENOUS; SUBCUTANEOUS at 12:28

## 2022-06-20 RX ADMIN — INSULIN LISPRO 2 UNITS: 100 INJECTION, SOLUTION INTRAVENOUS; SUBCUTANEOUS at 09:14

## 2022-06-21 LAB
ALBUMIN SERPL-MCNC: 2.8 G/DL (ref 3.5–5.2)
ALP SERPL-CCNC: 908 U/L (ref 39–117)
ALPHA1 GLOB MFR UR ELPH: 307 MG/DL (ref 90–200)
ALT SERPL W P-5'-P-CCNC: <5 U/L (ref 1–33)
ANION GAP SERPL CALCULATED.3IONS-SCNC: 14 MMOL/L (ref 5–15)
AST SERPL-CCNC: 34 U/L (ref 1–32)
BASOPHILS # BLD AUTO: 0.03 10*3/MM3 (ref 0–0.2)
BASOPHILS NFR BLD AUTO: 0.2 % (ref 0–1.5)
BILIRUB CONJ SERPL-MCNC: 0.4 MG/DL (ref 0–0.3)
BILIRUB INDIRECT SERPL-MCNC: 0.2 MG/DL
BILIRUB SERPL-MCNC: 0.6 MG/DL (ref 0–1.2)
BUN SERPL-MCNC: 23 MG/DL (ref 6–20)
BUN/CREAT SERPL: 7.8 (ref 7–25)
CALCIUM SPEC-SCNC: 8.1 MG/DL (ref 8.6–10.5)
CERULOPLASMIN SERPL-MCNC: 29 MG/DL (ref 19–39)
CHLORIDE SERPL-SCNC: 92 MMOL/L (ref 98–107)
CO2 SERPL-SCNC: 23 MMOL/L (ref 22–29)
CREAT SERPL-MCNC: 2.93 MG/DL (ref 0.57–1)
DEPRECATED RDW RBC AUTO: 47.5 FL (ref 37–54)
EGFRCR SERPLBLD CKD-EPI 2021: 18.2 ML/MIN/1.73
EOSINOPHIL # BLD AUTO: 0.01 10*3/MM3 (ref 0–0.4)
EOSINOPHIL NFR BLD AUTO: 0.1 % (ref 0.3–6.2)
ERYTHROCYTE [DISTWIDTH] IN BLOOD BY AUTOMATED COUNT: 16 % (ref 12.3–15.4)
FERRITIN SERPL-MCNC: 632 NG/ML (ref 13–150)
GLUCOSE BLDC GLUCOMTR-MCNC: 253 MG/DL (ref 70–130)
GLUCOSE BLDC GLUCOMTR-MCNC: 264 MG/DL (ref 70–130)
GLUCOSE BLDC GLUCOMTR-MCNC: 364 MG/DL (ref 70–130)
GLUCOSE BLDC GLUCOMTR-MCNC: 388 MG/DL (ref 70–130)
GLUCOSE SERPL-MCNC: 242 MG/DL (ref 65–99)
HCT VFR BLD AUTO: 26.3 % (ref 34–46.6)
HGB BLD-MCNC: 8.3 G/DL (ref 12–15.9)
IMM GRANULOCYTES # BLD AUTO: 0.2 10*3/MM3 (ref 0–0.05)
IMM GRANULOCYTES NFR BLD AUTO: 1.3 % (ref 0–0.5)
IRON 24H UR-MRATE: 26 MCG/DL (ref 37–145)
IRON SATN MFR SERPL: 13 % (ref 20–50)
LYMPHOCYTES # BLD AUTO: 0.88 10*3/MM3 (ref 0.7–3.1)
LYMPHOCYTES NFR BLD AUTO: 5.7 % (ref 19.6–45.3)
MCH RBC QN AUTO: 26 PG (ref 26.6–33)
MCHC RBC AUTO-ENTMCNC: 31.6 G/DL (ref 31.5–35.7)
MCV RBC AUTO: 82.4 FL (ref 79–97)
MONOCYTES # BLD AUTO: 1.11 10*3/MM3 (ref 0.1–0.9)
MONOCYTES NFR BLD AUTO: 7.2 % (ref 5–12)
NEUTROPHILS NFR BLD AUTO: 13.11 10*3/MM3 (ref 1.7–7)
NEUTROPHILS NFR BLD AUTO: 85.5 % (ref 42.7–76)
NRBC BLD AUTO-RTO: 0.1 /100 WBC (ref 0–0.2)
PHOSPHATE SERPL-MCNC: 2.5 MG/DL (ref 2.5–4.5)
PLATELET # BLD AUTO: 435 10*3/MM3 (ref 140–450)
PMV BLD AUTO: 10.3 FL (ref 6–12)
POTASSIUM SERPL-SCNC: 4.2 MMOL/L (ref 3.5–5.2)
PROT SERPL-MCNC: 6.9 G/DL (ref 6–8.5)
RBC # BLD AUTO: 3.19 10*6/MM3 (ref 3.77–5.28)
SODIUM SERPL-SCNC: 129 MMOL/L (ref 136–145)
TIBC SERPL-MCNC: 195 MCG/DL (ref 298–536)
TRANSFERRIN SERPL-MCNC: 131 MG/DL (ref 200–360)
WBC NRBC COR # BLD: 15.34 10*3/MM3 (ref 3.4–10.8)

## 2022-06-21 PROCEDURE — 83540 ASSAY OF IRON: CPT | Performed by: PHYSICIAN ASSISTANT

## 2022-06-21 PROCEDURE — 86235 NUCLEAR ANTIGEN ANTIBODY: CPT | Performed by: PHYSICIAN ASSISTANT

## 2022-06-21 PROCEDURE — 84100 ASSAY OF PHOSPHORUS: CPT | Performed by: INTERNAL MEDICINE

## 2022-06-21 PROCEDURE — 63710000001 INSULIN LISPRO (HUMAN) PER 5 UNITS: Performed by: PHYSICAL MEDICINE & REHABILITATION

## 2022-06-21 PROCEDURE — 82390 ASSAY OF CERULOPLASMIN: CPT | Performed by: PHYSICIAN ASSISTANT

## 2022-06-21 PROCEDURE — 85025 COMPLETE CBC W/AUTO DIFF WBC: CPT | Performed by: PHYSICAL MEDICINE & REHABILITATION

## 2022-06-21 PROCEDURE — 86225 DNA ANTIBODY NATIVE: CPT | Performed by: PHYSICIAN ASSISTANT

## 2022-06-21 PROCEDURE — 99232 SBSQ HOSP IP/OBS MODERATE 35: CPT | Performed by: INTERNAL MEDICINE

## 2022-06-21 PROCEDURE — 80076 HEPATIC FUNCTION PANEL: CPT | Performed by: PHYSICAL MEDICINE & REHABILITATION

## 2022-06-21 PROCEDURE — 97130 THER IVNTJ EA ADDL 15 MIN: CPT

## 2022-06-21 PROCEDURE — 82962 GLUCOSE BLOOD TEST: CPT

## 2022-06-21 PROCEDURE — 86038 ANTINUCLEAR ANTIBODIES: CPT | Performed by: PHYSICIAN ASSISTANT

## 2022-06-21 PROCEDURE — 63710000001 PREDNISONE PER 5 MG: Performed by: NURSE PRACTITIONER

## 2022-06-21 PROCEDURE — 97535 SELF CARE MNGMENT TRAINING: CPT

## 2022-06-21 PROCEDURE — 82728 ASSAY OF FERRITIN: CPT | Performed by: PHYSICIAN ASSISTANT

## 2022-06-21 PROCEDURE — 84466 ASSAY OF TRANSFERRIN: CPT | Performed by: PHYSICIAN ASSISTANT

## 2022-06-21 PROCEDURE — 97110 THERAPEUTIC EXERCISES: CPT

## 2022-06-21 PROCEDURE — 80048 BASIC METABOLIC PNL TOTAL CA: CPT | Performed by: INTERNAL MEDICINE

## 2022-06-21 PROCEDURE — 86015 ACTIN ANTIBODY EACH: CPT | Performed by: PHYSICIAN ASSISTANT

## 2022-06-21 PROCEDURE — 97129 THER IVNTJ 1ST 15 MIN: CPT

## 2022-06-21 PROCEDURE — 82103 ALPHA-1-ANTITRYPSIN TOTAL: CPT | Performed by: PHYSICIAN ASSISTANT

## 2022-06-21 RX ADMIN — ASPIRIN 81 MG: 81 TABLET, COATED ORAL at 08:24

## 2022-06-21 RX ADMIN — TRAMADOL HYDROCHLORIDE 25 MG: 50 TABLET, COATED ORAL at 14:39

## 2022-06-21 RX ADMIN — LEVOTHYROXINE SODIUM 150 MCG: 0.15 TABLET ORAL at 05:37

## 2022-06-21 RX ADMIN — DIPHENOXYLATE HYDROCHLORIDE AND ATROPINE SULFATE 1 TABLET: 2.5; .025 TABLET ORAL at 05:46

## 2022-06-21 RX ADMIN — TRAMADOL HYDROCHLORIDE 25 MG: 50 TABLET, COATED ORAL at 21:24

## 2022-06-21 RX ADMIN — SERTRALINE 100 MG: 100 TABLET, FILM COATED ORAL at 08:25

## 2022-06-21 RX ADMIN — INSULIN GLARGINE-YFGN 12 UNITS: 100 INJECTION, SOLUTION SUBCUTANEOUS at 12:07

## 2022-06-21 RX ADMIN — TAMSULOSIN HYDROCHLORIDE 0.4 MG: 0.4 CAPSULE ORAL at 08:25

## 2022-06-21 RX ADMIN — Medication 3 MG: at 01:22

## 2022-06-21 RX ADMIN — TRAMADOL HYDROCHLORIDE 25 MG: 50 TABLET, COATED ORAL at 01:45

## 2022-06-21 RX ADMIN — GABAPENTIN 300 MG: 300 CAPSULE ORAL at 08:24

## 2022-06-21 RX ADMIN — PANTOPRAZOLE SODIUM 40 MG: 40 TABLET, DELAYED RELEASE ORAL at 05:37

## 2022-06-21 RX ADMIN — CARVEDILOL 12.5 MG: 12.5 TABLET, FILM COATED ORAL at 08:24

## 2022-06-21 RX ADMIN — HYDRALAZINE HYDROCHLORIDE 100 MG: 50 TABLET, FILM COATED ORAL at 21:22

## 2022-06-21 RX ADMIN — TRAMADOL HYDROCHLORIDE 25 MG: 50 TABLET, COATED ORAL at 08:25

## 2022-06-21 RX ADMIN — LIDOCAINE 1 PATCH: 50 PATCH CUTANEOUS at 08:25

## 2022-06-21 RX ADMIN — Medication 1 TABLET: at 08:24

## 2022-06-21 RX ADMIN — INSULIN LISPRO 4 UNITS: 100 INJECTION, SOLUTION INTRAVENOUS; SUBCUTANEOUS at 17:11

## 2022-06-21 RX ADMIN — GABAPENTIN 300 MG: 300 CAPSULE ORAL at 21:21

## 2022-06-21 RX ADMIN — OXYCODONE 2.5 MG: 5 TABLET ORAL at 12:11

## 2022-06-21 RX ADMIN — ATORVASTATIN CALCIUM 40 MG: 20 TABLET, FILM COATED ORAL at 08:24

## 2022-06-21 RX ADMIN — INSULIN LISPRO 6 UNITS: 100 INJECTION, SOLUTION INTRAVENOUS; SUBCUTANEOUS at 08:24

## 2022-06-21 RX ADMIN — OXYCODONE 2.5 MG: 5 TABLET ORAL at 00:36

## 2022-06-21 RX ADMIN — ROPINIROLE HYDROCHLORIDE 0.75 MG: 0.5 TABLET, FILM COATED ORAL at 21:21

## 2022-06-21 RX ADMIN — LOSARTAN POTASSIUM 100 MG: 100 TABLET, FILM COATED ORAL at 08:24

## 2022-06-21 RX ADMIN — HYDRALAZINE HYDROCHLORIDE 100 MG: 50 TABLET, FILM COATED ORAL at 05:37

## 2022-06-21 RX ADMIN — HYDRALAZINE HYDROCHLORIDE 100 MG: 50 TABLET, FILM COATED ORAL at 14:37

## 2022-06-21 RX ADMIN — ACETAMINOPHEN 500 MG: 500 TABLET, FILM COATED ORAL at 10:43

## 2022-06-21 RX ADMIN — OXYCODONE 2.5 MG: 5 TABLET ORAL at 06:22

## 2022-06-21 RX ADMIN — AMLODIPINE BESYLATE 10 MG: 10 TABLET ORAL at 08:25

## 2022-06-21 RX ADMIN — PREDNISONE 60 MG: 50 TABLET ORAL at 08:25

## 2022-06-21 RX ADMIN — CARVEDILOL 12.5 MG: 12.5 TABLET, FILM COATED ORAL at 17:11

## 2022-06-21 RX ADMIN — INSULIN LISPRO 4 UNITS: 100 INJECTION, SOLUTION INTRAVENOUS; SUBCUTANEOUS at 12:05

## 2022-06-22 LAB
GLUCOSE BLDC GLUCOMTR-MCNC: 205 MG/DL (ref 70–130)
GLUCOSE BLDC GLUCOMTR-MCNC: 228 MG/DL (ref 70–130)
GLUCOSE BLDC GLUCOMTR-MCNC: 335 MG/DL (ref 70–130)
GLUCOSE BLDC GLUCOMTR-MCNC: 377 MG/DL (ref 70–130)
GLUCOSE BLDC GLUCOMTR-MCNC: 459 MG/DL (ref 70–130)
GLUCOSE BLDC GLUCOMTR-MCNC: 476 MG/DL (ref 70–130)
SMA IGG SER-ACNC: 6 UNITS (ref 0–19)

## 2022-06-22 PROCEDURE — 63710000001 INSULIN LISPRO (HUMAN) PER 5 UNITS: Performed by: PHYSICAL MEDICINE & REHABILITATION

## 2022-06-22 PROCEDURE — 97110 THERAPEUTIC EXERCISES: CPT

## 2022-06-22 PROCEDURE — 25010000002 HEPARIN (PORCINE) PER 1000 UNITS: Performed by: PHYSICAL MEDICINE & REHABILITATION

## 2022-06-22 PROCEDURE — 25010000002 EPOETIN ALFA-EPBX 10000 UNIT/ML SOLUTION: Performed by: PHYSICAL MEDICINE & REHABILITATION

## 2022-06-22 PROCEDURE — 97535 SELF CARE MNGMENT TRAINING: CPT

## 2022-06-22 PROCEDURE — 97130 THER IVNTJ EA ADDL 15 MIN: CPT

## 2022-06-22 PROCEDURE — 63710000001 PREDNISONE PER 5 MG: Performed by: NURSE PRACTITIONER

## 2022-06-22 PROCEDURE — 82962 GLUCOSE BLOOD TEST: CPT

## 2022-06-22 PROCEDURE — 97530 THERAPEUTIC ACTIVITIES: CPT

## 2022-06-22 PROCEDURE — 97129 THER IVNTJ 1ST 15 MIN: CPT

## 2022-06-22 PROCEDURE — 99231 SBSQ HOSP IP/OBS SF/LOW 25: CPT | Performed by: PHYSICIAN ASSISTANT

## 2022-06-22 RX ORDER — INSULIN LISPRO 100 [IU]/ML
0-9 INJECTION, SOLUTION INTRAVENOUS; SUBCUTANEOUS
Status: DISCONTINUED | OUTPATIENT
Start: 2022-06-22 | End: 2022-06-22

## 2022-06-22 RX ORDER — INSULIN LISPRO 100 [IU]/ML
0-14 INJECTION, SOLUTION INTRAVENOUS; SUBCUTANEOUS
Status: DISCONTINUED | OUTPATIENT
Start: 2022-06-22 | End: 2022-06-24

## 2022-06-22 RX ORDER — INSULIN LISPRO 100 [IU]/ML
7 INJECTION, SOLUTION INTRAVENOUS; SUBCUTANEOUS ONCE
Status: COMPLETED | OUTPATIENT
Start: 2022-06-22 | End: 2022-06-22

## 2022-06-22 RX ADMIN — LIDOCAINE 1 PATCH: 50 PATCH CUTANEOUS at 07:30

## 2022-06-22 RX ADMIN — CARVEDILOL 12.5 MG: 12.5 TABLET, FILM COATED ORAL at 07:29

## 2022-06-22 RX ADMIN — OXYCODONE 2.5 MG: 5 TABLET ORAL at 12:35

## 2022-06-22 RX ADMIN — POLYETHYLENE GLYCOL 3350 17 G: 17 POWDER, FOR SOLUTION ORAL at 07:29

## 2022-06-22 RX ADMIN — TRAMADOL HYDROCHLORIDE 25 MG: 50 TABLET, COATED ORAL at 07:29

## 2022-06-22 RX ADMIN — GABAPENTIN 300 MG: 300 CAPSULE ORAL at 20:44

## 2022-06-22 RX ADMIN — EPOETIN ALFA-EPBX 10000 UNITS: 10000 INJECTION, SOLUTION INTRAVENOUS; SUBCUTANEOUS at 16:40

## 2022-06-22 RX ADMIN — Medication 1 TABLET: at 07:29

## 2022-06-22 RX ADMIN — PREDNISONE 60 MG: 50 TABLET ORAL at 07:29

## 2022-06-22 RX ADMIN — Medication 3 MG: at 20:45

## 2022-06-22 RX ADMIN — GABAPENTIN 300 MG: 300 CAPSULE ORAL at 07:29

## 2022-06-22 RX ADMIN — ROPINIROLE HYDROCHLORIDE 0.75 MG: 0.5 TABLET, FILM COATED ORAL at 20:44

## 2022-06-22 RX ADMIN — HYDRALAZINE HYDROCHLORIDE 100 MG: 50 TABLET, FILM COATED ORAL at 06:20

## 2022-06-22 RX ADMIN — INSULIN LISPRO 6 UNITS: 100 INJECTION, SOLUTION INTRAVENOUS; SUBCUTANEOUS at 07:29

## 2022-06-22 RX ADMIN — OXYCODONE 2.5 MG: 5 TABLET ORAL at 00:39

## 2022-06-22 RX ADMIN — SERTRALINE 100 MG: 100 TABLET, FILM COATED ORAL at 07:29

## 2022-06-22 RX ADMIN — INSULIN LISPRO 5 UNITS: 100 INJECTION, SOLUTION INTRAVENOUS; SUBCUTANEOUS at 18:24

## 2022-06-22 RX ADMIN — TRAMADOL HYDROCHLORIDE 25 MG: 50 TABLET, COATED ORAL at 18:24

## 2022-06-22 RX ADMIN — HEPARIN SODIUM 3800 UNITS: 1000 INJECTION INTRAVENOUS; SUBCUTANEOUS at 16:40

## 2022-06-22 RX ADMIN — LEVOTHYROXINE SODIUM 150 MCG: 0.15 TABLET ORAL at 06:20

## 2022-06-22 RX ADMIN — HYDRALAZINE HYDROCHLORIDE 100 MG: 50 TABLET, FILM COATED ORAL at 20:44

## 2022-06-22 RX ADMIN — INSULIN LISPRO 5 UNITS: 100 INJECTION, SOLUTION INTRAVENOUS; SUBCUTANEOUS at 11:47

## 2022-06-22 RX ADMIN — AMLODIPINE BESYLATE 10 MG: 10 TABLET ORAL at 07:29

## 2022-06-22 RX ADMIN — ATORVASTATIN CALCIUM 40 MG: 20 TABLET, FILM COATED ORAL at 07:29

## 2022-06-22 RX ADMIN — INSULIN LISPRO 7 UNITS: 100 INJECTION, SOLUTION INTRAVENOUS; SUBCUTANEOUS at 03:12

## 2022-06-22 RX ADMIN — TAMSULOSIN HYDROCHLORIDE 0.4 MG: 0.4 CAPSULE ORAL at 07:29

## 2022-06-22 RX ADMIN — DOCUSATE SODIUM 50MG AND SENNOSIDES 8.6MG 1 TABLET: 8.6; 5 TABLET, FILM COATED ORAL at 07:29

## 2022-06-22 RX ADMIN — CARVEDILOL 12.5 MG: 12.5 TABLET, FILM COATED ORAL at 18:24

## 2022-06-22 RX ADMIN — LOSARTAN POTASSIUM 100 MG: 100 TABLET, FILM COATED ORAL at 07:29

## 2022-06-22 RX ADMIN — Medication 3 MG: at 00:42

## 2022-06-22 RX ADMIN — TRAMADOL HYDROCHLORIDE 25 MG: 50 TABLET, COATED ORAL at 22:34

## 2022-06-22 RX ADMIN — ASPIRIN 81 MG: 81 TABLET, COATED ORAL at 07:29

## 2022-06-22 RX ADMIN — PANTOPRAZOLE SODIUM 40 MG: 40 TABLET, DELAYED RELEASE ORAL at 06:20

## 2022-06-22 RX ADMIN — OXYCODONE 2.5 MG: 5 TABLET ORAL at 20:46

## 2022-06-22 RX ADMIN — INSULIN GLARGINE-YFGN 12 UNITS: 100 INJECTION, SOLUTION SUBCUTANEOUS at 11:47

## 2022-06-23 LAB
ALBUMIN SERPL-MCNC: 3.2 G/DL (ref 3.5–5.2)
ALP SERPL-CCNC: 797 U/L (ref 39–117)
ALT SERPL W P-5'-P-CCNC: 7 U/L (ref 1–33)
ANA SER QL IF: POSITIVE
ANA SPECKLED TITR SER: ABNORMAL {TITER}
ANION GAP SERPL CALCULATED.3IONS-SCNC: 13 MMOL/L (ref 5–15)
AST SERPL-CCNC: 34 U/L (ref 1–32)
BASOPHILS # BLD AUTO: 0.03 10*3/MM3 (ref 0–0.2)
BASOPHILS NFR BLD AUTO: 0.1 % (ref 0–1.5)
BILIRUB CONJ SERPL-MCNC: 0.3 MG/DL (ref 0–0.3)
BILIRUB INDIRECT SERPL-MCNC: 0.3 MG/DL
BILIRUB SERPL-MCNC: 0.6 MG/DL (ref 0–1.2)
BUN SERPL-MCNC: 33 MG/DL (ref 6–20)
BUN/CREAT SERPL: 11.8 (ref 7–25)
CALCIUM SPEC-SCNC: 8.1 MG/DL (ref 8.6–10.5)
CENTROMERE B AB SER-ACNC: 0.3 AI (ref 0–0.9)
CHLORIDE SERPL-SCNC: 91 MMOL/L (ref 98–107)
CHROMATIN AB SERPL-ACNC: 0.2 AI (ref 0–0.9)
CO2 SERPL-SCNC: 24 MMOL/L (ref 22–29)
CREAT SERPL-MCNC: 2.79 MG/DL (ref 0.57–1)
DEPRECATED RDW RBC AUTO: 46.1 FL (ref 37–54)
DSDNA AB SER-ACNC: 1 IU/ML (ref 0–9)
EGFRCR SERPLBLD CKD-EPI 2021: 19.3 ML/MIN/1.73
ENA JO1 AB SER-ACNC: <0.2 AI (ref 0–0.9)
ENA RNP AB SER-ACNC: <0.2 AI (ref 0–0.9)
ENA SCL70 AB SER-ACNC: <0.2 AI (ref 0–0.9)
ENA SM AB SER-ACNC: <0.2 AI (ref 0–0.9)
ENA SS-A AB SER-ACNC: <0.2 AI (ref 0–0.9)
ENA SS-B AB SER-ACNC: <0.2 AI (ref 0–0.9)
EOSINOPHIL # BLD AUTO: 0 10*3/MM3 (ref 0–0.4)
EOSINOPHIL NFR BLD AUTO: 0 % (ref 0.3–6.2)
ERYTHROCYTE [DISTWIDTH] IN BLOOD BY AUTOMATED COUNT: 16.3 % (ref 12.3–15.4)
GLUCOSE BLDC GLUCOMTR-MCNC: 124 MG/DL (ref 70–130)
GLUCOSE BLDC GLUCOMTR-MCNC: 207 MG/DL (ref 70–130)
GLUCOSE BLDC GLUCOMTR-MCNC: 230 MG/DL (ref 70–130)
GLUCOSE BLDC GLUCOMTR-MCNC: 317 MG/DL (ref 70–130)
GLUCOSE BLDC GLUCOMTR-MCNC: 327 MG/DL (ref 70–130)
GLUCOSE SERPL-MCNC: 236 MG/DL (ref 65–99)
HCT VFR BLD AUTO: 28.2 % (ref 34–46.6)
HGB BLD-MCNC: 9.1 G/DL (ref 12–15.9)
IMM GRANULOCYTES # BLD AUTO: 0.69 10*3/MM3 (ref 0–0.05)
IMM GRANULOCYTES NFR BLD AUTO: 3.2 % (ref 0–0.5)
LABORATORY COMMENT REPORT: ABNORMAL
LYMPHOCYTES # BLD AUTO: 0.85 10*3/MM3 (ref 0.7–3.1)
LYMPHOCYTES NFR BLD AUTO: 3.9 % (ref 19.6–45.3)
Lab: ABNORMAL
Lab: ABNORMAL
MCH RBC QN AUTO: 25.9 PG (ref 26.6–33)
MCHC RBC AUTO-ENTMCNC: 32.3 G/DL (ref 31.5–35.7)
MCV RBC AUTO: 80.3 FL (ref 79–97)
MONOCYTES # BLD AUTO: 0.15 10*3/MM3 (ref 0.1–0.9)
MONOCYTES NFR BLD AUTO: 0.7 % (ref 5–12)
NEUTROPHILS NFR BLD AUTO: 20.03 10*3/MM3 (ref 1.7–7)
NEUTROPHILS NFR BLD AUTO: 92.1 % (ref 42.7–76)
NRBC BLD AUTO-RTO: 0 /100 WBC (ref 0–0.2)
PHOSPHATE SERPL-MCNC: 2.3 MG/DL (ref 2.5–4.5)
PLATELET # BLD AUTO: 510 10*3/MM3 (ref 140–450)
PMV BLD AUTO: 10.1 FL (ref 6–12)
POTASSIUM SERPL-SCNC: 4.3 MMOL/L (ref 3.5–5.2)
PROT SERPL-MCNC: 6.9 G/DL (ref 6–8.5)
RBC # BLD AUTO: 3.51 10*6/MM3 (ref 3.77–5.28)
SODIUM SERPL-SCNC: 128 MMOL/L (ref 136–145)
WBC NRBC COR # BLD: 21.75 10*3/MM3 (ref 3.4–10.8)

## 2022-06-23 PROCEDURE — 80076 HEPATIC FUNCTION PANEL: CPT | Performed by: PHYSICAL MEDICINE & REHABILITATION

## 2022-06-23 PROCEDURE — 97530 THERAPEUTIC ACTIVITIES: CPT

## 2022-06-23 PROCEDURE — 97535 SELF CARE MNGMENT TRAINING: CPT

## 2022-06-23 PROCEDURE — 97110 THERAPEUTIC EXERCISES: CPT

## 2022-06-23 PROCEDURE — 84100 ASSAY OF PHOSPHORUS: CPT | Performed by: INTERNAL MEDICINE

## 2022-06-23 PROCEDURE — 82962 GLUCOSE BLOOD TEST: CPT

## 2022-06-23 PROCEDURE — 63710000001 PREDNISONE PER 5 MG: Performed by: NURSE PRACTITIONER

## 2022-06-23 PROCEDURE — 99232 SBSQ HOSP IP/OBS MODERATE 35: CPT | Performed by: PSYCHIATRY & NEUROLOGY

## 2022-06-23 PROCEDURE — 85025 COMPLETE CBC W/AUTO DIFF WBC: CPT | Performed by: PHYSICAL MEDICINE & REHABILITATION

## 2022-06-23 PROCEDURE — 99232 SBSQ HOSP IP/OBS MODERATE 35: CPT | Performed by: NURSE PRACTITIONER

## 2022-06-23 PROCEDURE — 83036 HEMOGLOBIN GLYCOSYLATED A1C: CPT | Performed by: NURSE PRACTITIONER

## 2022-06-23 PROCEDURE — 63710000001 INSULIN LISPRO (HUMAN) PER 5 UNITS: Performed by: PHYSICAL MEDICINE & REHABILITATION

## 2022-06-23 PROCEDURE — 80048 BASIC METABOLIC PNL TOTAL CA: CPT | Performed by: INTERNAL MEDICINE

## 2022-06-23 RX ORDER — CARVEDILOL 25 MG/1
25 TABLET ORAL 2 TIMES DAILY WITH MEALS
Status: DISCONTINUED | OUTPATIENT
Start: 2022-06-23 | End: 2022-07-22 | Stop reason: HOSPADM

## 2022-06-23 RX ADMIN — PREDNISONE 60 MG: 50 TABLET ORAL at 08:56

## 2022-06-23 RX ADMIN — INSULIN GLARGINE-YFGN 8 UNITS: 100 INJECTION, SOLUTION SUBCUTANEOUS at 21:13

## 2022-06-23 RX ADMIN — INSULIN LISPRO 5 UNITS: 100 INJECTION, SOLUTION INTRAVENOUS; SUBCUTANEOUS at 17:12

## 2022-06-23 RX ADMIN — TRAMADOL HYDROCHLORIDE 25 MG: 50 TABLET, COATED ORAL at 08:19

## 2022-06-23 RX ADMIN — Medication 1 TABLET: at 08:17

## 2022-06-23 RX ADMIN — TAMSULOSIN HYDROCHLORIDE 0.4 MG: 0.4 CAPSULE ORAL at 08:59

## 2022-06-23 RX ADMIN — HYDRALAZINE HYDROCHLORIDE 100 MG: 50 TABLET, FILM COATED ORAL at 14:29

## 2022-06-23 RX ADMIN — SERTRALINE 100 MG: 100 TABLET, FILM COATED ORAL at 08:59

## 2022-06-23 RX ADMIN — POLYETHYLENE GLYCOL 3350 17 G: 17 POWDER, FOR SOLUTION ORAL at 21:13

## 2022-06-23 RX ADMIN — ACETAMINOPHEN 500 MG: 500 TABLET, FILM COATED ORAL at 22:16

## 2022-06-23 RX ADMIN — OXYCODONE 2.5 MG: 5 TABLET ORAL at 12:34

## 2022-06-23 RX ADMIN — INSULIN GLARGINE-YFGN 8 UNITS: 100 INJECTION, SOLUTION SUBCUTANEOUS at 08:19

## 2022-06-23 RX ADMIN — GABAPENTIN 300 MG: 300 CAPSULE ORAL at 08:18

## 2022-06-23 RX ADMIN — INSULIN LISPRO 5 UNITS: 100 INJECTION, SOLUTION INTRAVENOUS; SUBCUTANEOUS at 08:18

## 2022-06-23 RX ADMIN — LEVOTHYROXINE SODIUM 150 MCG: 0.15 TABLET ORAL at 08:57

## 2022-06-23 RX ADMIN — DOCUSATE SODIUM 50MG AND SENNOSIDES 8.6MG 1 TABLET: 8.6; 5 TABLET, FILM COATED ORAL at 21:13

## 2022-06-23 RX ADMIN — CARVEDILOL 25 MG: 25 TABLET, FILM COATED ORAL at 17:12

## 2022-06-23 RX ADMIN — CARVEDILOL 12.5 MG: 12.5 TABLET, FILM COATED ORAL at 08:58

## 2022-06-23 RX ADMIN — HYDRALAZINE HYDROCHLORIDE 100 MG: 50 TABLET, FILM COATED ORAL at 21:12

## 2022-06-23 RX ADMIN — OXYCODONE 2.5 MG: 5 TABLET ORAL at 18:20

## 2022-06-23 RX ADMIN — ASPIRIN 81 MG: 81 TABLET, COATED ORAL at 08:18

## 2022-06-23 RX ADMIN — ATORVASTATIN CALCIUM 40 MG: 20 TABLET, FILM COATED ORAL at 08:17

## 2022-06-23 RX ADMIN — LIDOCAINE 1 PATCH: 50 PATCH CUTANEOUS at 08:18

## 2022-06-23 RX ADMIN — OXYCODONE 2.5 MG: 5 TABLET ORAL at 05:16

## 2022-06-23 RX ADMIN — ROPINIROLE HYDROCHLORIDE 0.75 MG: 0.5 TABLET, FILM COATED ORAL at 21:12

## 2022-06-23 RX ADMIN — AMLODIPINE BESYLATE 10 MG: 10 TABLET ORAL at 08:58

## 2022-06-23 RX ADMIN — GABAPENTIN 300 MG: 300 CAPSULE ORAL at 21:13

## 2022-06-23 RX ADMIN — ACETAMINOPHEN 500 MG: 500 TABLET, FILM COATED ORAL at 08:56

## 2022-06-23 RX ADMIN — PANTOPRAZOLE SODIUM 40 MG: 40 TABLET, DELAYED RELEASE ORAL at 05:16

## 2022-06-23 RX ADMIN — Medication 3 MG: at 22:12

## 2022-06-23 RX ADMIN — LOSARTAN POTASSIUM 100 MG: 100 TABLET, FILM COATED ORAL at 08:58

## 2022-06-23 RX ADMIN — HYDRALAZINE HYDROCHLORIDE 100 MG: 50 TABLET, FILM COATED ORAL at 08:57

## 2022-06-24 DIAGNOSIS — R93.5 ABNORMAL FINDINGS ON DIAGNOSTIC IMAGING OF ABDOMEN: Primary | ICD-10-CM

## 2022-06-24 PROBLEM — R82.90 ABNORMAL URINALYSIS: Status: ACTIVE | Noted: 2022-01-01

## 2022-06-24 LAB
ALBUMIN SERPL-MCNC: 2.9 G/DL (ref 3.5–5.2)
ALP SERPL-CCNC: 833 U/L (ref 39–117)
ALT SERPL W P-5'-P-CCNC: 10 U/L (ref 1–33)
ANION GAP SERPL CALCULATED.3IONS-SCNC: 13 MMOL/L (ref 5–15)
AST SERPL-CCNC: 39 U/L (ref 1–32)
B-OH-BUTYR SERPL-SCNC: 0.06 MMOL/L (ref 0.02–0.27)
BACTERIA UR QL AUTO: ABNORMAL /HPF
BASOPHILS # BLD AUTO: 0.03 10*3/MM3 (ref 0–0.2)
BASOPHILS NFR BLD AUTO: 0.1 % (ref 0–1.5)
BILIRUB CONJ SERPL-MCNC: 0.3 MG/DL (ref 0–0.3)
BILIRUB INDIRECT SERPL-MCNC: 0.2 MG/DL
BILIRUB SERPL-MCNC: 0.5 MG/DL (ref 0–1.2)
BILIRUB UR QL STRIP: NEGATIVE
BUN SERPL-MCNC: 46 MG/DL (ref 6–20)
BUN/CREAT SERPL: 12.4 (ref 7–25)
CALCIUM SPEC-SCNC: 8.2 MG/DL (ref 8.6–10.5)
CHLORIDE SERPL-SCNC: 91 MMOL/L (ref 98–107)
CLARITY UR: ABNORMAL
CO2 SERPL-SCNC: 22 MMOL/L (ref 22–29)
COLOR UR: ABNORMAL
CREAT SERPL-MCNC: 3.71 MG/DL (ref 0.57–1)
DEPRECATED RDW RBC AUTO: 50.2 FL (ref 37–54)
EGFRCR SERPLBLD CKD-EPI 2021: 13.7 ML/MIN/1.73
EOSINOPHIL # BLD AUTO: 0 10*3/MM3 (ref 0–0.4)
EOSINOPHIL NFR BLD AUTO: 0 % (ref 0.3–6.2)
ERYTHROCYTE [DISTWIDTH] IN BLOOD BY AUTOMATED COUNT: 16.5 % (ref 12.3–15.4)
GLUCOSE BLDC GLUCOMTR-MCNC: 202 MG/DL (ref 70–130)
GLUCOSE BLDC GLUCOMTR-MCNC: 276 MG/DL (ref 70–130)
GLUCOSE BLDC GLUCOMTR-MCNC: 295 MG/DL (ref 70–130)
GLUCOSE BLDC GLUCOMTR-MCNC: 416 MG/DL (ref 70–130)
GLUCOSE BLDC GLUCOMTR-MCNC: 483 MG/DL (ref 70–130)
GLUCOSE BLDC GLUCOMTR-MCNC: 516 MG/DL (ref 70–130)
GLUCOSE SERPL-MCNC: 301 MG/DL (ref 65–99)
GLUCOSE UR STRIP-MCNC: ABNORMAL MG/DL
HBA1C MFR BLD: 6.6 % (ref 4.8–5.6)
HCT VFR BLD AUTO: 27.5 % (ref 34–46.6)
HGB BLD-MCNC: 8.3 G/DL (ref 12–15.9)
HGB UR QL STRIP.AUTO: ABNORMAL
HYALINE CASTS UR QL AUTO: ABNORMAL /LPF
IMM GRANULOCYTES # BLD AUTO: 0.81 10*3/MM3 (ref 0–0.05)
IMM GRANULOCYTES NFR BLD AUTO: 3.7 % (ref 0–0.5)
KETONES UR QL STRIP: NEGATIVE
LEUKOCYTE ESTERASE UR QL STRIP.AUTO: ABNORMAL
LYMPHOCYTES # BLD AUTO: 1.46 10*3/MM3 (ref 0.7–3.1)
LYMPHOCYTES NFR BLD AUTO: 6.7 % (ref 19.6–45.3)
MCH RBC QN AUTO: 25.5 PG (ref 26.6–33)
MCHC RBC AUTO-ENTMCNC: 30.2 G/DL (ref 31.5–35.7)
MCV RBC AUTO: 84.4 FL (ref 79–97)
MONOCYTES # BLD AUTO: 1.44 10*3/MM3 (ref 0.1–0.9)
MONOCYTES NFR BLD AUTO: 6.6 % (ref 5–12)
NEUTROPHILS NFR BLD AUTO: 18.1 10*3/MM3 (ref 1.7–7)
NEUTROPHILS NFR BLD AUTO: 82.9 % (ref 42.7–76)
NITRITE UR QL STRIP: NEGATIVE
NRBC BLD AUTO-RTO: 0 /100 WBC (ref 0–0.2)
PH UR STRIP.AUTO: 7 [PH] (ref 5–8)
PHOSPHATE SERPL-MCNC: 2.3 MG/DL (ref 2.5–4.5)
PLATELET # BLD AUTO: 485 10*3/MM3 (ref 140–450)
PMV BLD AUTO: 10.3 FL (ref 6–12)
POTASSIUM SERPL-SCNC: 4.2 MMOL/L (ref 3.5–5.2)
PROT SERPL-MCNC: 6.3 G/DL (ref 6–8.5)
PROT UR QL STRIP: ABNORMAL
RBC # BLD AUTO: 3.26 10*6/MM3 (ref 3.77–5.28)
RBC # UR STRIP: ABNORMAL /HPF
REF LAB TEST METHOD: ABNORMAL
SODIUM SERPL-SCNC: 126 MMOL/L (ref 136–145)
SP GR UR STRIP: 1.02 (ref 1–1.03)
SQUAMOUS #/AREA URNS HPF: ABNORMAL /HPF
UROBILINOGEN UR QL STRIP: ABNORMAL
WBC # UR STRIP: ABNORMAL /HPF
WBC NRBC COR # BLD: 21.84 10*3/MM3 (ref 3.4–10.8)

## 2022-06-24 PROCEDURE — 87086 URINE CULTURE/COLONY COUNT: CPT | Performed by: PHYSICAL MEDICINE & REHABILITATION

## 2022-06-24 PROCEDURE — 97110 THERAPEUTIC EXERCISES: CPT

## 2022-06-24 PROCEDURE — 85025 COMPLETE CBC W/AUTO DIFF WBC: CPT | Performed by: PHYSICAL MEDICINE & REHABILITATION

## 2022-06-24 PROCEDURE — 63710000001 PREDNISONE PER 5 MG: Performed by: NURSE PRACTITIONER

## 2022-06-24 PROCEDURE — 82962 GLUCOSE BLOOD TEST: CPT

## 2022-06-24 PROCEDURE — 63710000001 INSULIN LISPRO (HUMAN) PER 5 UNITS: Performed by: PHYSICAL MEDICINE & REHABILITATION

## 2022-06-24 PROCEDURE — 63710000001 INSULIN LISPRO (HUMAN) PER 5 UNITS: Performed by: NURSE PRACTITIONER

## 2022-06-24 PROCEDURE — 97535 SELF CARE MNGMENT TRAINING: CPT

## 2022-06-24 PROCEDURE — 81001 URINALYSIS AUTO W/SCOPE: CPT | Performed by: PHYSICAL MEDICINE & REHABILITATION

## 2022-06-24 PROCEDURE — 99232 SBSQ HOSP IP/OBS MODERATE 35: CPT | Performed by: NURSE PRACTITIONER

## 2022-06-24 PROCEDURE — 25010000002 HEPARIN (PORCINE) PER 1000 UNITS: Performed by: PHYSICAL MEDICINE & REHABILITATION

## 2022-06-24 PROCEDURE — 80076 HEPATIC FUNCTION PANEL: CPT | Performed by: PHYSICAL MEDICINE & REHABILITATION

## 2022-06-24 PROCEDURE — 84100 ASSAY OF PHOSPHORUS: CPT | Performed by: INTERNAL MEDICINE

## 2022-06-24 PROCEDURE — 82010 KETONE BODYS QUAN: CPT | Performed by: NURSE PRACTITIONER

## 2022-06-24 PROCEDURE — 25010000002 EPOETIN ALFA-EPBX 10000 UNIT/ML SOLUTION: Performed by: PHYSICAL MEDICINE & REHABILITATION

## 2022-06-24 PROCEDURE — 80048 BASIC METABOLIC PNL TOTAL CA: CPT | Performed by: INTERNAL MEDICINE

## 2022-06-24 RX ORDER — INSULIN LISPRO 100 [IU]/ML
10 INJECTION, SOLUTION INTRAVENOUS; SUBCUTANEOUS ONCE
Status: COMPLETED | OUTPATIENT
Start: 2022-06-24 | End: 2022-06-24

## 2022-06-24 RX ORDER — INSULIN LISPRO 100 [IU]/ML
0-24 INJECTION, SOLUTION INTRAVENOUS; SUBCUTANEOUS
Status: DISCONTINUED | OUTPATIENT
Start: 2022-06-24 | End: 2022-06-28

## 2022-06-24 RX ADMIN — PANTOPRAZOLE SODIUM 40 MG: 40 TABLET, DELAYED RELEASE ORAL at 05:41

## 2022-06-24 RX ADMIN — POLYETHYLENE GLYCOL 3350 17 G: 17 POWDER, FOR SOLUTION ORAL at 07:51

## 2022-06-24 RX ADMIN — PREDNISONE 60 MG: 50 TABLET ORAL at 07:52

## 2022-06-24 RX ADMIN — INSULIN LISPRO 12 UNITS: 100 INJECTION, SOLUTION INTRAVENOUS; SUBCUTANEOUS at 12:10

## 2022-06-24 RX ADMIN — LEVOTHYROXINE SODIUM 150 MCG: 0.15 TABLET ORAL at 05:41

## 2022-06-24 RX ADMIN — SERTRALINE 100 MG: 100 TABLET, FILM COATED ORAL at 07:52

## 2022-06-24 RX ADMIN — INSULIN LISPRO 8 UNITS: 100 INJECTION, SOLUTION INTRAVENOUS; SUBCUTANEOUS at 19:28

## 2022-06-24 RX ADMIN — CARVEDILOL 25 MG: 25 TABLET, FILM COATED ORAL at 19:28

## 2022-06-24 RX ADMIN — GABAPENTIN 300 MG: 300 CAPSULE ORAL at 22:12

## 2022-06-24 RX ADMIN — ASPIRIN 81 MG: 81 TABLET, COATED ORAL at 07:52

## 2022-06-24 RX ADMIN — EPOETIN ALFA-EPBX 10000 UNITS: 10000 INJECTION, SOLUTION INTRAVENOUS; SUBCUTANEOUS at 16:20

## 2022-06-24 RX ADMIN — LOSARTAN POTASSIUM 100 MG: 100 TABLET, FILM COATED ORAL at 07:52

## 2022-06-24 RX ADMIN — Medication 1 TABLET: at 07:51

## 2022-06-24 RX ADMIN — INSULIN GLARGINE-YFGN 15 UNITS: 100 INJECTION, SOLUTION SUBCUTANEOUS at 22:13

## 2022-06-24 RX ADMIN — OXYCODONE 2.5 MG: 5 TABLET ORAL at 13:42

## 2022-06-24 RX ADMIN — HYDRALAZINE HYDROCHLORIDE 100 MG: 50 TABLET, FILM COATED ORAL at 05:41

## 2022-06-24 RX ADMIN — INSULIN GLARGINE-YFGN 8 UNITS: 100 INJECTION, SOLUTION SUBCUTANEOUS at 07:55

## 2022-06-24 RX ADMIN — OXYCODONE 2.5 MG: 5 TABLET ORAL at 09:38

## 2022-06-24 RX ADMIN — CARVEDILOL 25 MG: 25 TABLET, FILM COATED ORAL at 07:51

## 2022-06-24 RX ADMIN — LIDOCAINE 1 PATCH: 50 PATCH CUTANEOUS at 07:53

## 2022-06-24 RX ADMIN — INSULIN LISPRO 10 UNITS: 100 INJECTION, SOLUTION INTRAVENOUS; SUBCUTANEOUS at 03:13

## 2022-06-24 RX ADMIN — GABAPENTIN 300 MG: 300 CAPSULE ORAL at 07:51

## 2022-06-24 RX ADMIN — OXYCODONE 2.5 MG: 5 TABLET ORAL at 03:14

## 2022-06-24 RX ADMIN — HYDRALAZINE HYDROCHLORIDE 100 MG: 50 TABLET, FILM COATED ORAL at 22:13

## 2022-06-24 RX ADMIN — INSULIN LISPRO 10 UNITS: 100 INJECTION, SOLUTION INTRAVENOUS; SUBCUTANEOUS at 07:53

## 2022-06-24 RX ADMIN — AMLODIPINE BESYLATE 10 MG: 10 TABLET ORAL at 07:50

## 2022-06-24 RX ADMIN — ACETAMINOPHEN 500 MG: 500 TABLET, FILM COATED ORAL at 09:38

## 2022-06-24 RX ADMIN — ROPINIROLE HYDROCHLORIDE 0.75 MG: 0.5 TABLET, FILM COATED ORAL at 22:13

## 2022-06-24 RX ADMIN — HEPARIN SODIUM 3800 UNITS: 1000 INJECTION INTRAVENOUS; SUBCUTANEOUS at 16:20

## 2022-06-24 RX ADMIN — TAMSULOSIN HYDROCHLORIDE 0.4 MG: 0.4 CAPSULE ORAL at 07:50

## 2022-06-24 RX ADMIN — ATORVASTATIN CALCIUM 40 MG: 20 TABLET, FILM COATED ORAL at 07:51

## 2022-06-25 LAB
ALBUMIN SERPL-MCNC: 2.8 G/DL (ref 3.5–5.2)
ALP SERPL-CCNC: 640 U/L (ref 39–117)
ALT SERPL W P-5'-P-CCNC: 8 U/L (ref 1–33)
ANION GAP SERPL CALCULATED.3IONS-SCNC: 12 MMOL/L (ref 5–15)
ANISOCYTOSIS BLD QL: ABNORMAL
AST SERPL-CCNC: 26 U/L (ref 1–32)
BACTERIA SPEC AEROBE CULT: NO GROWTH
BILIRUB CONJ SERPL-MCNC: 0.2 MG/DL (ref 0–0.3)
BILIRUB INDIRECT SERPL-MCNC: 0.3 MG/DL
BILIRUB SERPL-MCNC: 0.5 MG/DL (ref 0–1.2)
BUN SERPL-MCNC: 36 MG/DL (ref 6–20)
BUN/CREAT SERPL: 12.9 (ref 7–25)
CALCIUM SPEC-SCNC: 7.6 MG/DL (ref 8.6–10.5)
CHLORIDE SERPL-SCNC: 95 MMOL/L (ref 98–107)
CO2 SERPL-SCNC: 22 MMOL/L (ref 22–29)
CREAT SERPL-MCNC: 2.78 MG/DL (ref 0.57–1)
DEPRECATED RDW RBC AUTO: 50.3 FL (ref 37–54)
EGFRCR SERPLBLD CKD-EPI 2021: 19.4 ML/MIN/1.73
ERYTHROCYTE [DISTWIDTH] IN BLOOD BY AUTOMATED COUNT: 16.5 % (ref 12.3–15.4)
GLUCOSE BLDC GLUCOMTR-MCNC: 143 MG/DL (ref 70–130)
GLUCOSE BLDC GLUCOMTR-MCNC: 286 MG/DL (ref 70–130)
GLUCOSE BLDC GLUCOMTR-MCNC: 303 MG/DL (ref 70–130)
GLUCOSE BLDC GLUCOMTR-MCNC: 395 MG/DL (ref 70–130)
GLUCOSE SERPL-MCNC: 425 MG/DL (ref 65–99)
HCT VFR BLD AUTO: 26.9 % (ref 34–46.6)
HGB BLD-MCNC: 8 G/DL (ref 12–15.9)
HYPOCHROMIA BLD QL: ABNORMAL
LYMPHOCYTES # BLD MANUAL: 1.3 10*3/MM3 (ref 0.7–3.1)
LYMPHOCYTES NFR BLD MANUAL: 5 % (ref 5–12)
MCH RBC QN AUTO: 25.3 PG (ref 26.6–33)
MCHC RBC AUTO-ENTMCNC: 29.7 G/DL (ref 31.5–35.7)
MCV RBC AUTO: 85.1 FL (ref 79–97)
METAMYELOCYTES NFR BLD MANUAL: 5 % (ref 0–0)
MONOCYTES # BLD: 1.09 10*3/MM3 (ref 0.1–0.9)
MYELOCYTES NFR BLD MANUAL: 1 % (ref 0–0)
NEUTROPHILS # BLD AUTO: 18.03 10*3/MM3 (ref 1.7–7)
NEUTROPHILS NFR BLD MANUAL: 83 % (ref 42.7–76)
NRBC BLD AUTO-RTO: 0.1 /100 WBC (ref 0–0.2)
PHOSPHATE SERPL-MCNC: 2.4 MG/DL (ref 2.5–4.5)
PLAT MORPH BLD: NORMAL
PLATELET # BLD AUTO: 451 10*3/MM3 (ref 140–450)
PMV BLD AUTO: 10 FL (ref 6–12)
POLYCHROMASIA BLD QL SMEAR: ABNORMAL
POTASSIUM SERPL-SCNC: 4.1 MMOL/L (ref 3.5–5.2)
PROT SERPL-MCNC: 5.8 G/DL (ref 6–8.5)
RBC # BLD AUTO: 3.16 10*6/MM3 (ref 3.77–5.28)
SODIUM SERPL-SCNC: 129 MMOL/L (ref 136–145)
VARIANT LYMPHS NFR BLD MANUAL: 6 % (ref 19.6–45.3)
WBC MORPH BLD: NORMAL
WBC NRBC COR # BLD: 21.72 10*3/MM3 (ref 3.4–10.8)

## 2022-06-25 PROCEDURE — 99232 SBSQ HOSP IP/OBS MODERATE 35: CPT | Performed by: INTERNAL MEDICINE

## 2022-06-25 PROCEDURE — 97116 GAIT TRAINING THERAPY: CPT | Performed by: PHYSICAL THERAPIST

## 2022-06-25 PROCEDURE — 63710000001 INSULIN LISPRO (HUMAN) PER 5 UNITS: Performed by: NURSE PRACTITIONER

## 2022-06-25 PROCEDURE — 97110 THERAPEUTIC EXERCISES: CPT | Performed by: PHYSICAL THERAPIST

## 2022-06-25 PROCEDURE — 63710000001 INSULIN LISPRO (HUMAN) PER 5 UNITS: Performed by: STUDENT IN AN ORGANIZED HEALTH CARE EDUCATION/TRAINING PROGRAM

## 2022-06-25 PROCEDURE — 85025 COMPLETE CBC W/AUTO DIFF WBC: CPT | Performed by: PHYSICAL MEDICINE & REHABILITATION

## 2022-06-25 PROCEDURE — 25010000002 NA FERRIC GLUC CPLX PER 12.5 MG: Performed by: HOSPITALIST

## 2022-06-25 PROCEDURE — 85007 BL SMEAR W/DIFF WBC COUNT: CPT | Performed by: PHYSICAL MEDICINE & REHABILITATION

## 2022-06-25 PROCEDURE — 80048 BASIC METABOLIC PNL TOTAL CA: CPT | Performed by: INTERNAL MEDICINE

## 2022-06-25 PROCEDURE — 63710000001 PREDNISONE PER 5 MG: Performed by: NURSE PRACTITIONER

## 2022-06-25 PROCEDURE — 80076 HEPATIC FUNCTION PANEL: CPT | Performed by: PHYSICAL MEDICINE & REHABILITATION

## 2022-06-25 PROCEDURE — 82962 GLUCOSE BLOOD TEST: CPT

## 2022-06-25 PROCEDURE — 84100 ASSAY OF PHOSPHORUS: CPT | Performed by: INTERNAL MEDICINE

## 2022-06-25 RX ORDER — INSULIN LISPRO 100 [IU]/ML
3 INJECTION, SOLUTION INTRAVENOUS; SUBCUTANEOUS
Status: DISCONTINUED | OUTPATIENT
Start: 2022-06-25 | End: 2022-06-26

## 2022-06-25 RX ADMIN — OXYCODONE 2.5 MG: 5 TABLET ORAL at 00:23

## 2022-06-25 RX ADMIN — PREDNISONE 60 MG: 50 TABLET ORAL at 08:03

## 2022-06-25 RX ADMIN — INSULIN LISPRO 20 UNITS: 100 INJECTION, SOLUTION INTRAVENOUS; SUBCUTANEOUS at 08:01

## 2022-06-25 RX ADMIN — CARVEDILOL 25 MG: 25 TABLET, FILM COATED ORAL at 17:33

## 2022-06-25 RX ADMIN — Medication 1 TABLET: at 08:03

## 2022-06-25 RX ADMIN — Medication 3 MG: at 00:23

## 2022-06-25 RX ADMIN — LEVOTHYROXINE SODIUM 150 MCG: 0.15 TABLET ORAL at 05:18

## 2022-06-25 RX ADMIN — HYDRALAZINE HYDROCHLORIDE 100 MG: 50 TABLET, FILM COATED ORAL at 14:39

## 2022-06-25 RX ADMIN — TAMSULOSIN HYDROCHLORIDE 0.4 MG: 0.4 CAPSULE ORAL at 08:02

## 2022-06-25 RX ADMIN — SODIUM CHLORIDE 125 MG: 9 INJECTION, SOLUTION INTRAVENOUS at 00:13

## 2022-06-25 RX ADMIN — Medication 3 MG: at 22:52

## 2022-06-25 RX ADMIN — INSULIN GLARGINE-YFGN 20 UNITS: 100 INJECTION, SOLUTION SUBCUTANEOUS at 22:52

## 2022-06-25 RX ADMIN — INSULIN GLARGINE-YFGN 20 UNITS: 100 INJECTION, SOLUTION SUBCUTANEOUS at 08:00

## 2022-06-25 RX ADMIN — HYDRALAZINE HYDROCHLORIDE 100 MG: 50 TABLET, FILM COATED ORAL at 22:51

## 2022-06-25 RX ADMIN — GABAPENTIN 300 MG: 300 CAPSULE ORAL at 08:03

## 2022-06-25 RX ADMIN — CARVEDILOL 25 MG: 25 TABLET, FILM COATED ORAL at 08:03

## 2022-06-25 RX ADMIN — INSULIN LISPRO 16 UNITS: 100 INJECTION, SOLUTION INTRAVENOUS; SUBCUTANEOUS at 11:46

## 2022-06-25 RX ADMIN — SERTRALINE 100 MG: 100 TABLET, FILM COATED ORAL at 08:03

## 2022-06-25 RX ADMIN — ACETAMINOPHEN 500 MG: 500 TABLET, FILM COATED ORAL at 19:11

## 2022-06-25 RX ADMIN — LOSARTAN POTASSIUM 100 MG: 100 TABLET, FILM COATED ORAL at 08:03

## 2022-06-25 RX ADMIN — HYDRALAZINE HYDROCHLORIDE 100 MG: 50 TABLET, FILM COATED ORAL at 05:18

## 2022-06-25 RX ADMIN — ACETAMINOPHEN 500 MG: 500 TABLET, FILM COATED ORAL at 05:18

## 2022-06-25 RX ADMIN — ASPIRIN 81 MG: 81 TABLET, COATED ORAL at 08:02

## 2022-06-25 RX ADMIN — ACETAMINOPHEN 500 MG: 500 TABLET, FILM COATED ORAL at 13:20

## 2022-06-25 RX ADMIN — PANTOPRAZOLE SODIUM 40 MG: 40 TABLET, DELAYED RELEASE ORAL at 05:18

## 2022-06-25 RX ADMIN — INSULIN LISPRO 3 UNITS: 100 INJECTION, SOLUTION INTRAVENOUS; SUBCUTANEOUS at 17:33

## 2022-06-25 RX ADMIN — ROPINIROLE HYDROCHLORIDE 0.75 MG: 0.5 TABLET, FILM COATED ORAL at 22:51

## 2022-06-25 RX ADMIN — OXYCODONE 2.5 MG: 5 TABLET ORAL at 19:11

## 2022-06-25 RX ADMIN — OXYCODONE 2.5 MG: 5 TABLET ORAL at 07:12

## 2022-06-25 RX ADMIN — OXYCODONE 2.5 MG: 5 TABLET ORAL at 13:19

## 2022-06-25 RX ADMIN — AMLODIPINE BESYLATE 10 MG: 10 TABLET ORAL at 08:03

## 2022-06-25 RX ADMIN — GABAPENTIN 300 MG: 300 CAPSULE ORAL at 22:51

## 2022-06-25 RX ADMIN — LIDOCAINE 1 PATCH: 50 PATCH CUTANEOUS at 08:03

## 2022-06-26 LAB
ALBUMIN SERPL-MCNC: 3.1 G/DL (ref 3.5–5.2)
ALP SERPL-CCNC: 622 U/L (ref 39–117)
ALT SERPL W P-5'-P-CCNC: 9 U/L (ref 1–33)
ANION GAP SERPL CALCULATED.3IONS-SCNC: 12 MMOL/L (ref 5–15)
AST SERPL-CCNC: 25 U/L (ref 1–32)
BASOPHILS # BLD AUTO: 0.03 10*3/MM3 (ref 0–0.2)
BASOPHILS NFR BLD AUTO: 0.1 % (ref 0–1.5)
BILIRUB CONJ SERPL-MCNC: 0.3 MG/DL (ref 0–0.3)
BILIRUB INDIRECT SERPL-MCNC: 0.2 MG/DL
BILIRUB SERPL-MCNC: 0.5 MG/DL (ref 0–1.2)
BUN SERPL-MCNC: 58 MG/DL (ref 6–20)
BUN/CREAT SERPL: 17 (ref 7–25)
CALCIUM SPEC-SCNC: 7.9 MG/DL (ref 8.6–10.5)
CHLORIDE SERPL-SCNC: 95 MMOL/L (ref 98–107)
CO2 SERPL-SCNC: 22 MMOL/L (ref 22–29)
CREAT SERPL-MCNC: 3.42 MG/DL (ref 0.57–1)
DEPRECATED RDW RBC AUTO: 51.6 FL (ref 37–54)
EGFRCR SERPLBLD CKD-EPI 2021: 15.1 ML/MIN/1.73
EOSINOPHIL # BLD AUTO: 0 10*3/MM3 (ref 0–0.4)
EOSINOPHIL NFR BLD AUTO: 0 % (ref 0.3–6.2)
ERYTHROCYTE [DISTWIDTH] IN BLOOD BY AUTOMATED COUNT: 17 % (ref 12.3–15.4)
GLUCOSE BLDC GLUCOMTR-MCNC: 172 MG/DL (ref 70–130)
GLUCOSE BLDC GLUCOMTR-MCNC: 299 MG/DL (ref 70–130)
GLUCOSE BLDC GLUCOMTR-MCNC: 357 MG/DL (ref 70–130)
GLUCOSE BLDC GLUCOMTR-MCNC: 382 MG/DL (ref 70–130)
GLUCOSE BLDC GLUCOMTR-MCNC: 384 MG/DL (ref 70–130)
GLUCOSE SERPL-MCNC: 391 MG/DL (ref 65–99)
HCT VFR BLD AUTO: 29.6 % (ref 34–46.6)
HGB BLD-MCNC: 8.7 G/DL (ref 12–15.9)
IMM GRANULOCYTES # BLD AUTO: 1.1 10*3/MM3 (ref 0–0.05)
IMM GRANULOCYTES NFR BLD AUTO: 4.9 % (ref 0–0.5)
LYMPHOCYTES # BLD AUTO: 2.01 10*3/MM3 (ref 0.7–3.1)
LYMPHOCYTES NFR BLD AUTO: 8.9 % (ref 19.6–45.3)
MCH RBC QN AUTO: 25.2 PG (ref 26.6–33)
MCHC RBC AUTO-ENTMCNC: 29.4 G/DL (ref 31.5–35.7)
MCV RBC AUTO: 85.8 FL (ref 79–97)
MONOCYTES # BLD AUTO: 1.11 10*3/MM3 (ref 0.1–0.9)
MONOCYTES NFR BLD AUTO: 4.9 % (ref 5–12)
NEUTROPHILS NFR BLD AUTO: 18.4 10*3/MM3 (ref 1.7–7)
NEUTROPHILS NFR BLD AUTO: 81.2 % (ref 42.7–76)
NRBC BLD AUTO-RTO: 0.1 /100 WBC (ref 0–0.2)
PHOSPHATE SERPL-MCNC: 3.1 MG/DL (ref 2.5–4.5)
PLATELET # BLD AUTO: 465 10*3/MM3 (ref 140–450)
PMV BLD AUTO: 9.9 FL (ref 6–12)
POTASSIUM SERPL-SCNC: 4.6 MMOL/L (ref 3.5–5.2)
PROT SERPL-MCNC: 6.1 G/DL (ref 6–8.5)
RBC # BLD AUTO: 3.45 10*6/MM3 (ref 3.77–5.28)
SODIUM SERPL-SCNC: 129 MMOL/L (ref 136–145)
WBC NRBC COR # BLD: 22.65 10*3/MM3 (ref 3.4–10.8)

## 2022-06-26 PROCEDURE — 63710000001 PREDNISONE PER 5 MG: Performed by: NURSE PRACTITIONER

## 2022-06-26 PROCEDURE — 99232 SBSQ HOSP IP/OBS MODERATE 35: CPT | Performed by: INTERNAL MEDICINE

## 2022-06-26 PROCEDURE — 85025 COMPLETE CBC W/AUTO DIFF WBC: CPT | Performed by: PHYSICAL MEDICINE & REHABILITATION

## 2022-06-26 PROCEDURE — 63710000001 INSULIN LISPRO (HUMAN) PER 5 UNITS: Performed by: STUDENT IN AN ORGANIZED HEALTH CARE EDUCATION/TRAINING PROGRAM

## 2022-06-26 PROCEDURE — 84100 ASSAY OF PHOSPHORUS: CPT | Performed by: INTERNAL MEDICINE

## 2022-06-26 PROCEDURE — 63710000001 INSULIN LISPRO (HUMAN) PER 5 UNITS: Performed by: NURSE PRACTITIONER

## 2022-06-26 PROCEDURE — 80048 BASIC METABOLIC PNL TOTAL CA: CPT | Performed by: INTERNAL MEDICINE

## 2022-06-26 PROCEDURE — 80076 HEPATIC FUNCTION PANEL: CPT | Performed by: PHYSICAL MEDICINE & REHABILITATION

## 2022-06-26 PROCEDURE — 82962 GLUCOSE BLOOD TEST: CPT

## 2022-06-26 RX ORDER — OXYCODONE HYDROCHLORIDE 5 MG/1
2.5 TABLET ORAL EVERY 6 HOURS PRN
Status: DISCONTINUED | OUTPATIENT
Start: 2022-06-26 | End: 2022-06-28

## 2022-06-26 RX ORDER — INSULIN LISPRO 100 [IU]/ML
5 INJECTION, SOLUTION INTRAVENOUS; SUBCUTANEOUS
Status: DISCONTINUED | OUTPATIENT
Start: 2022-06-26 | End: 2022-06-29

## 2022-06-26 RX ADMIN — ACETAMINOPHEN 500 MG: 500 TABLET, FILM COATED ORAL at 13:12

## 2022-06-26 RX ADMIN — OXYCODONE 2.5 MG: 5 TABLET ORAL at 13:25

## 2022-06-26 RX ADMIN — TAMSULOSIN HYDROCHLORIDE 0.4 MG: 0.4 CAPSULE ORAL at 09:04

## 2022-06-26 RX ADMIN — LEVOTHYROXINE SODIUM 150 MCG: 0.15 TABLET ORAL at 07:06

## 2022-06-26 RX ADMIN — PREDNISONE 60 MG: 50 TABLET ORAL at 09:03

## 2022-06-26 RX ADMIN — INSULIN LISPRO 3 UNITS: 100 INJECTION, SOLUTION INTRAVENOUS; SUBCUTANEOUS at 09:05

## 2022-06-26 RX ADMIN — GABAPENTIN 300 MG: 300 CAPSULE ORAL at 20:37

## 2022-06-26 RX ADMIN — ACETAMINOPHEN 500 MG: 500 TABLET, FILM COATED ORAL at 20:37

## 2022-06-26 RX ADMIN — ACETAMINOPHEN 500 MG: 500 TABLET, FILM COATED ORAL at 09:05

## 2022-06-26 RX ADMIN — CARVEDILOL 25 MG: 25 TABLET, FILM COATED ORAL at 16:44

## 2022-06-26 RX ADMIN — LIDOCAINE 1 PATCH: 50 PATCH CUTANEOUS at 09:05

## 2022-06-26 RX ADMIN — INSULIN LISPRO 5 UNITS: 100 INJECTION, SOLUTION INTRAVENOUS; SUBCUTANEOUS at 16:44

## 2022-06-26 RX ADMIN — INSULIN LISPRO 20 UNITS: 100 INJECTION, SOLUTION INTRAVENOUS; SUBCUTANEOUS at 11:36

## 2022-06-26 RX ADMIN — DOCUSATE SODIUM 50MG AND SENNOSIDES 8.6MG 1 TABLET: 8.6; 5 TABLET, FILM COATED ORAL at 09:04

## 2022-06-26 RX ADMIN — INSULIN LISPRO 3 UNITS: 100 INJECTION, SOLUTION INTRAVENOUS; SUBCUTANEOUS at 11:36

## 2022-06-26 RX ADMIN — OXYCODONE 2.5 MG: 5 TABLET ORAL at 10:26

## 2022-06-26 RX ADMIN — HYDRALAZINE HYDROCHLORIDE 100 MG: 50 TABLET, FILM COATED ORAL at 07:05

## 2022-06-26 RX ADMIN — OXYCODONE 2.5 MG: 5 TABLET ORAL at 20:37

## 2022-06-26 RX ADMIN — INSULIN GLARGINE-YFGN 30 UNITS: 100 INJECTION, SOLUTION SUBCUTANEOUS at 20:40

## 2022-06-26 RX ADMIN — AMLODIPINE BESYLATE 10 MG: 10 TABLET ORAL at 09:03

## 2022-06-26 RX ADMIN — ROPINIROLE HYDROCHLORIDE 0.75 MG: 0.5 TABLET, FILM COATED ORAL at 20:36

## 2022-06-26 RX ADMIN — HYDRALAZINE HYDROCHLORIDE 100 MG: 50 TABLET, FILM COATED ORAL at 13:12

## 2022-06-26 RX ADMIN — PANTOPRAZOLE SODIUM 40 MG: 40 TABLET, DELAYED RELEASE ORAL at 07:06

## 2022-06-26 RX ADMIN — ASPIRIN 81 MG: 81 TABLET, COATED ORAL at 09:03

## 2022-06-26 RX ADMIN — LOSARTAN POTASSIUM 100 MG: 100 TABLET, FILM COATED ORAL at 09:03

## 2022-06-26 RX ADMIN — INSULIN LISPRO 4 UNITS: 100 INJECTION, SOLUTION INTRAVENOUS; SUBCUTANEOUS at 16:44

## 2022-06-26 RX ADMIN — CARVEDILOL 25 MG: 25 TABLET, FILM COATED ORAL at 09:03

## 2022-06-26 RX ADMIN — SERTRALINE 100 MG: 100 TABLET, FILM COATED ORAL at 09:03

## 2022-06-26 RX ADMIN — GABAPENTIN 300 MG: 300 CAPSULE ORAL at 09:04

## 2022-06-26 RX ADMIN — OXYCODONE 2.5 MG: 5 TABLET ORAL at 07:06

## 2022-06-26 RX ADMIN — INSULIN LISPRO 0 UNITS: 100 INJECTION, SOLUTION INTRAVENOUS; SUBCUTANEOUS at 09:04

## 2022-06-26 RX ADMIN — HYDRALAZINE HYDROCHLORIDE 100 MG: 50 TABLET, FILM COATED ORAL at 20:39

## 2022-06-26 RX ADMIN — INSULIN GLARGINE-YFGN 30 UNITS: 100 INJECTION, SOLUTION SUBCUTANEOUS at 09:03

## 2022-06-26 RX ADMIN — Medication 1 TABLET: at 09:03

## 2022-06-27 LAB
ALBUMIN SERPL-MCNC: 3 G/DL (ref 3.5–5.2)
ALP SERPL-CCNC: 718 U/L (ref 39–117)
ALT SERPL W P-5'-P-CCNC: 11 U/L (ref 1–33)
ANION GAP SERPL CALCULATED.3IONS-SCNC: 17.5 MMOL/L (ref 5–15)
AST SERPL-CCNC: 28 U/L (ref 1–32)
BASOPHILS # BLD AUTO: 0.05 10*3/MM3 (ref 0–0.2)
BASOPHILS NFR BLD AUTO: 0.2 % (ref 0–1.5)
BILIRUB CONJ SERPL-MCNC: 0.3 MG/DL (ref 0–0.3)
BILIRUB INDIRECT SERPL-MCNC: 0.2 MG/DL
BILIRUB SERPL-MCNC: 0.5 MG/DL (ref 0–1.2)
BUN SERPL-MCNC: 76 MG/DL (ref 6–20)
BUN/CREAT SERPL: 17.7 (ref 7–25)
CALCIUM SPEC-SCNC: 8.6 MG/DL (ref 8.6–10.5)
CHLORIDE SERPL-SCNC: 96 MMOL/L (ref 98–107)
CO2 SERPL-SCNC: 17.5 MMOL/L (ref 22–29)
CREAT SERPL-MCNC: 4.29 MG/DL (ref 0.57–1)
DEPRECATED RDW RBC AUTO: 53.9 FL (ref 37–54)
EGFRCR SERPLBLD CKD-EPI 2021: 11.5 ML/MIN/1.73
EOSINOPHIL # BLD AUTO: 0 10*3/MM3 (ref 0–0.4)
EOSINOPHIL NFR BLD AUTO: 0 % (ref 0.3–6.2)
ERYTHROCYTE [DISTWIDTH] IN BLOOD BY AUTOMATED COUNT: 17.4 % (ref 12.3–15.4)
GLUCOSE BLDC GLUCOMTR-MCNC: 113 MG/DL (ref 70–130)
GLUCOSE BLDC GLUCOMTR-MCNC: 210 MG/DL (ref 70–130)
GLUCOSE BLDC GLUCOMTR-MCNC: 272 MG/DL (ref 70–130)
GLUCOSE BLDC GLUCOMTR-MCNC: 352 MG/DL (ref 70–130)
GLUCOSE BLDC GLUCOMTR-MCNC: 397 MG/DL (ref 70–130)
GLUCOSE SERPL-MCNC: 308 MG/DL (ref 65–99)
HCT VFR BLD AUTO: 28.9 % (ref 34–46.6)
HGB BLD-MCNC: 8.7 G/DL (ref 12–15.9)
IMM GRANULOCYTES # BLD AUTO: 0.86 10*3/MM3 (ref 0–0.05)
IMM GRANULOCYTES NFR BLD AUTO: 3.2 % (ref 0–0.5)
LYMPHOCYTES # BLD AUTO: 1.38 10*3/MM3 (ref 0.7–3.1)
LYMPHOCYTES NFR BLD AUTO: 5.2 % (ref 19.6–45.3)
MCH RBC QN AUTO: 26.1 PG (ref 26.6–33)
MCHC RBC AUTO-ENTMCNC: 30.1 G/DL (ref 31.5–35.7)
MCV RBC AUTO: 86.8 FL (ref 79–97)
MONOCYTES # BLD AUTO: 1.21 10*3/MM3 (ref 0.1–0.9)
MONOCYTES NFR BLD AUTO: 4.5 % (ref 5–12)
NEUTROPHILS NFR BLD AUTO: 23.25 10*3/MM3 (ref 1.7–7)
NEUTROPHILS NFR BLD AUTO: 86.9 % (ref 42.7–76)
NRBC BLD AUTO-RTO: 0 /100 WBC (ref 0–0.2)
PHOSPHATE SERPL-MCNC: 2.8 MG/DL (ref 2.5–4.5)
PLATELET # BLD AUTO: 466 10*3/MM3 (ref 140–450)
PMV BLD AUTO: 9.9 FL (ref 6–12)
POTASSIUM SERPL-SCNC: 5.8 MMOL/L (ref 3.5–5.2)
PROT SERPL-MCNC: 6.4 G/DL (ref 6–8.5)
RBC # BLD AUTO: 3.33 10*6/MM3 (ref 3.77–5.28)
SODIUM SERPL-SCNC: 131 MMOL/L (ref 136–145)
WBC NRBC COR # BLD: 26.75 10*3/MM3 (ref 3.4–10.8)

## 2022-06-27 PROCEDURE — 97535 SELF CARE MNGMENT TRAINING: CPT

## 2022-06-27 PROCEDURE — 97110 THERAPEUTIC EXERCISES: CPT

## 2022-06-27 PROCEDURE — 97530 THERAPEUTIC ACTIVITIES: CPT

## 2022-06-27 PROCEDURE — 63710000001 INSULIN LISPRO (HUMAN) PER 5 UNITS: Performed by: STUDENT IN AN ORGANIZED HEALTH CARE EDUCATION/TRAINING PROGRAM

## 2022-06-27 PROCEDURE — 25010000002 HEPARIN (PORCINE) PER 1000 UNITS: Performed by: PHYSICAL MEDICINE & REHABILITATION

## 2022-06-27 PROCEDURE — 82962 GLUCOSE BLOOD TEST: CPT

## 2022-06-27 PROCEDURE — 80076 HEPATIC FUNCTION PANEL: CPT | Performed by: PHYSICAL MEDICINE & REHABILITATION

## 2022-06-27 PROCEDURE — 84100 ASSAY OF PHOSPHORUS: CPT | Performed by: INTERNAL MEDICINE

## 2022-06-27 PROCEDURE — 25010000002 EPOETIN ALFA-EPBX 10000 UNIT/ML SOLUTION: Performed by: PHYSICAL MEDICINE & REHABILITATION

## 2022-06-27 PROCEDURE — 63710000001 INSULIN LISPRO (HUMAN) PER 5 UNITS: Performed by: NURSE PRACTITIONER

## 2022-06-27 PROCEDURE — 80048 BASIC METABOLIC PNL TOTAL CA: CPT | Performed by: INTERNAL MEDICINE

## 2022-06-27 PROCEDURE — 63710000001 PREDNISONE PER 5 MG: Performed by: NURSE PRACTITIONER

## 2022-06-27 PROCEDURE — 85025 COMPLETE CBC W/AUTO DIFF WBC: CPT | Performed by: PHYSICAL MEDICINE & REHABILITATION

## 2022-06-27 RX ORDER — ALBUMIN (HUMAN) 12.5 G/50ML
12.5 SOLUTION INTRAVENOUS AS NEEDED
Status: CANCELLED | OUTPATIENT
Start: 2022-06-27 | End: 2022-06-28

## 2022-06-27 RX ADMIN — HYDRALAZINE HYDROCHLORIDE 100 MG: 50 TABLET, FILM COATED ORAL at 21:04

## 2022-06-27 RX ADMIN — POLYETHYLENE GLYCOL 3350 17 G: 17 POWDER, FOR SOLUTION ORAL at 07:43

## 2022-06-27 RX ADMIN — HYDRALAZINE HYDROCHLORIDE 100 MG: 50 TABLET, FILM COATED ORAL at 05:12

## 2022-06-27 RX ADMIN — INSULIN LISPRO 20 UNITS: 100 INJECTION, SOLUTION INTRAVENOUS; SUBCUTANEOUS at 07:44

## 2022-06-27 RX ADMIN — Medication 1 TABLET: at 07:45

## 2022-06-27 RX ADMIN — CARVEDILOL 25 MG: 25 TABLET, FILM COATED ORAL at 18:37

## 2022-06-27 RX ADMIN — INSULIN GLARGINE-YFGN 35 UNITS: 100 INJECTION, SOLUTION SUBCUTANEOUS at 21:02

## 2022-06-27 RX ADMIN — ASPIRIN 81 MG: 81 TABLET, COATED ORAL at 07:44

## 2022-06-27 RX ADMIN — PANTOPRAZOLE SODIUM 40 MG: 40 TABLET, DELAYED RELEASE ORAL at 05:12

## 2022-06-27 RX ADMIN — HEPARIN SODIUM 3800 UNITS: 1000 INJECTION INTRAVENOUS; SUBCUTANEOUS at 15:09

## 2022-06-27 RX ADMIN — EPOETIN ALFA-EPBX 10000 UNITS: 10000 INJECTION, SOLUTION INTRAVENOUS; SUBCUTANEOUS at 15:08

## 2022-06-27 RX ADMIN — LOSARTAN POTASSIUM 100 MG: 100 TABLET, FILM COATED ORAL at 07:46

## 2022-06-27 RX ADMIN — INSULIN GLARGINE-YFGN 30 UNITS: 100 INJECTION, SOLUTION SUBCUTANEOUS at 07:43

## 2022-06-27 RX ADMIN — OXYCODONE 2.5 MG: 5 TABLET ORAL at 02:14

## 2022-06-27 RX ADMIN — INSULIN LISPRO 5 UNITS: 100 INJECTION, SOLUTION INTRAVENOUS; SUBCUTANEOUS at 11:37

## 2022-06-27 RX ADMIN — ACETAMINOPHEN 500 MG: 500 TABLET, FILM COATED ORAL at 09:07

## 2022-06-27 RX ADMIN — INSULIN LISPRO 12 UNITS: 100 INJECTION, SOLUTION INTRAVENOUS; SUBCUTANEOUS at 11:37

## 2022-06-27 RX ADMIN — ROPINIROLE HYDROCHLORIDE 0.75 MG: 0.5 TABLET, FILM COATED ORAL at 21:02

## 2022-06-27 RX ADMIN — INSULIN LISPRO 5 UNITS: 100 INJECTION, SOLUTION INTRAVENOUS; SUBCUTANEOUS at 18:37

## 2022-06-27 RX ADMIN — LIDOCAINE 1 PATCH: 50 PATCH CUTANEOUS at 07:46

## 2022-06-27 RX ADMIN — TAMSULOSIN HYDROCHLORIDE 0.4 MG: 0.4 CAPSULE ORAL at 07:45

## 2022-06-27 RX ADMIN — GABAPENTIN 300 MG: 300 CAPSULE ORAL at 21:02

## 2022-06-27 RX ADMIN — CARVEDILOL 25 MG: 25 TABLET, FILM COATED ORAL at 07:46

## 2022-06-27 RX ADMIN — AMLODIPINE BESYLATE 10 MG: 10 TABLET ORAL at 07:45

## 2022-06-27 RX ADMIN — OXYCODONE 2.5 MG: 5 TABLET ORAL at 18:39

## 2022-06-27 RX ADMIN — LEVOTHYROXINE SODIUM 150 MCG: 0.15 TABLET ORAL at 05:12

## 2022-06-27 RX ADMIN — SERTRALINE 100 MG: 100 TABLET, FILM COATED ORAL at 07:45

## 2022-06-27 RX ADMIN — OXYCODONE 2.5 MG: 5 TABLET ORAL at 09:07

## 2022-06-27 RX ADMIN — INSULIN LISPRO 5 UNITS: 100 INJECTION, SOLUTION INTRAVENOUS; SUBCUTANEOUS at 07:45

## 2022-06-27 RX ADMIN — GABAPENTIN 300 MG: 300 CAPSULE ORAL at 07:44

## 2022-06-27 RX ADMIN — PREDNISONE 60 MG: 50 TABLET ORAL at 07:46

## 2022-06-27 RX ADMIN — Medication 3 MG: at 23:16

## 2022-06-28 LAB
ALBUMIN SERPL-MCNC: 3 G/DL (ref 3.5–5.2)
ALP SERPL-CCNC: 503 U/L (ref 39–117)
ALT SERPL W P-5'-P-CCNC: 10 U/L (ref 1–33)
ANION GAP SERPL CALCULATED.3IONS-SCNC: 12 MMOL/L (ref 5–15)
AST SERPL-CCNC: 26 U/L (ref 1–32)
BASOPHILS # BLD AUTO: 0.03 10*3/MM3 (ref 0–0.2)
BASOPHILS NFR BLD AUTO: 0.1 % (ref 0–1.5)
BILIRUB CONJ SERPL-MCNC: 0.2 MG/DL (ref 0–0.3)
BILIRUB INDIRECT SERPL-MCNC: 0.2 MG/DL
BILIRUB SERPL-MCNC: 0.4 MG/DL (ref 0–1.2)
BUN SERPL-MCNC: 44 MG/DL (ref 6–20)
BUN/CREAT SERPL: 16.7 (ref 7–25)
CALCIUM SPEC-SCNC: 8 MG/DL (ref 8.6–10.5)
CHLORIDE SERPL-SCNC: 100 MMOL/L (ref 98–107)
CO2 SERPL-SCNC: 24 MMOL/L (ref 22–29)
CREAT SERPL-MCNC: 2.63 MG/DL (ref 0.57–1)
DEPRECATED RDW RBC AUTO: 52.2 FL (ref 37–54)
EGFRCR SERPLBLD CKD-EPI 2021: 20.8 ML/MIN/1.73
EOSINOPHIL # BLD AUTO: 0.01 10*3/MM3 (ref 0–0.4)
EOSINOPHIL NFR BLD AUTO: 0 % (ref 0.3–6.2)
ERYTHROCYTE [DISTWIDTH] IN BLOOD BY AUTOMATED COUNT: 18.1 % (ref 12.3–15.4)
GLUCOSE BLDC GLUCOMTR-MCNC: 122 MG/DL (ref 70–130)
GLUCOSE BLDC GLUCOMTR-MCNC: 157 MG/DL (ref 70–130)
GLUCOSE BLDC GLUCOMTR-MCNC: 214 MG/DL (ref 70–130)
GLUCOSE BLDC GLUCOMTR-MCNC: 235 MG/DL (ref 70–130)
GLUCOSE BLDC GLUCOMTR-MCNC: 49 MG/DL (ref 70–130)
GLUCOSE BLDC GLUCOMTR-MCNC: 49 MG/DL (ref 70–130)
GLUCOSE BLDC GLUCOMTR-MCNC: 98 MG/DL (ref 70–130)
GLUCOSE SERPL-MCNC: 103 MG/DL (ref 65–99)
HCT VFR BLD AUTO: 27.4 % (ref 34–46.6)
HGB BLD-MCNC: 8.8 G/DL (ref 12–15.9)
IMM GRANULOCYTES # BLD AUTO: 0.53 10*3/MM3 (ref 0–0.05)
IMM GRANULOCYTES NFR BLD AUTO: 2.5 % (ref 0–0.5)
LYMPHOCYTES # BLD AUTO: 2.64 10*3/MM3 (ref 0.7–3.1)
LYMPHOCYTES NFR BLD AUTO: 12.4 % (ref 19.6–45.3)
MCH RBC QN AUTO: 26.2 PG (ref 26.6–33)
MCHC RBC AUTO-ENTMCNC: 32.1 G/DL (ref 31.5–35.7)
MCV RBC AUTO: 81.5 FL (ref 79–97)
MONOCYTES # BLD AUTO: 1.28 10*3/MM3 (ref 0.1–0.9)
MONOCYTES NFR BLD AUTO: 6 % (ref 5–12)
NEUTROPHILS NFR BLD AUTO: 16.86 10*3/MM3 (ref 1.7–7)
NEUTROPHILS NFR BLD AUTO: 79 % (ref 42.7–76)
NRBC BLD AUTO-RTO: 0 /100 WBC (ref 0–0.2)
PHOSPHATE SERPL-MCNC: 2.6 MG/DL (ref 2.5–4.5)
PLATELET # BLD AUTO: 364 10*3/MM3 (ref 140–450)
PMV BLD AUTO: 10 FL (ref 6–12)
POTASSIUM SERPL-SCNC: 5.1 MMOL/L (ref 3.5–5.2)
PROT SERPL-MCNC: 5.9 G/DL (ref 6–8.5)
RBC # BLD AUTO: 3.36 10*6/MM3 (ref 3.77–5.28)
SODIUM SERPL-SCNC: 136 MMOL/L (ref 136–145)
WBC NRBC COR # BLD: 21.35 10*3/MM3 (ref 3.4–10.8)

## 2022-06-28 PROCEDURE — 63710000001 INSULIN LISPRO (HUMAN) PER 5 UNITS: Performed by: STUDENT IN AN ORGANIZED HEALTH CARE EDUCATION/TRAINING PROGRAM

## 2022-06-28 PROCEDURE — 63710000001 INSULIN LISPRO (HUMAN) PER 5 UNITS: Performed by: NURSE PRACTITIONER

## 2022-06-28 PROCEDURE — 80048 BASIC METABOLIC PNL TOTAL CA: CPT | Performed by: INTERNAL MEDICINE

## 2022-06-28 PROCEDURE — 97110 THERAPEUTIC EXERCISES: CPT

## 2022-06-28 PROCEDURE — 82962 GLUCOSE BLOOD TEST: CPT

## 2022-06-28 PROCEDURE — 85025 COMPLETE CBC W/AUTO DIFF WBC: CPT | Performed by: PHYSICAL MEDICINE & REHABILITATION

## 2022-06-28 PROCEDURE — 63710000001 PREDNISONE PER 5 MG: Performed by: NURSE PRACTITIONER

## 2022-06-28 PROCEDURE — 84100 ASSAY OF PHOSPHORUS: CPT | Performed by: INTERNAL MEDICINE

## 2022-06-28 PROCEDURE — 80076 HEPATIC FUNCTION PANEL: CPT | Performed by: PHYSICAL MEDICINE & REHABILITATION

## 2022-06-28 RX ORDER — OXYCODONE HYDROCHLORIDE 5 MG/1
2.5 TABLET ORAL ONCE AS NEEDED
Status: COMPLETED | OUTPATIENT
Start: 2022-06-28 | End: 2022-06-28

## 2022-06-28 RX ORDER — OXYCODONE HYDROCHLORIDE 5 MG/1
2.5 TABLET ORAL EVERY 4 HOURS PRN
Status: DISCONTINUED | OUTPATIENT
Start: 2022-06-28 | End: 2022-07-22 | Stop reason: HOSPADM

## 2022-06-28 RX ORDER — OXYCODONE HYDROCHLORIDE 5 MG/1
5 TABLET ORAL EVERY 4 HOURS PRN
Status: DISCONTINUED | OUTPATIENT
Start: 2022-06-28 | End: 2022-07-22 | Stop reason: HOSPADM

## 2022-06-28 RX ORDER — ZOLPIDEM TARTRATE 5 MG/1
5 TABLET ORAL NIGHTLY PRN
Status: DISCONTINUED | OUTPATIENT
Start: 2022-06-28 | End: 2022-07-06

## 2022-06-28 RX ADMIN — INSULIN LISPRO 9 UNITS: 100 INJECTION, SOLUTION INTRAVENOUS; SUBCUTANEOUS at 08:17

## 2022-06-28 RX ADMIN — PREDNISONE 60 MG: 50 TABLET ORAL at 08:15

## 2022-06-28 RX ADMIN — ZOLPIDEM TARTRATE 5 MG: 5 TABLET ORAL at 21:32

## 2022-06-28 RX ADMIN — Medication 1 TABLET: at 08:16

## 2022-06-28 RX ADMIN — TAMSULOSIN HYDROCHLORIDE 0.4 MG: 0.4 CAPSULE ORAL at 08:14

## 2022-06-28 RX ADMIN — ACETAMINOPHEN 500 MG: 500 TABLET, FILM COATED ORAL at 00:38

## 2022-06-28 RX ADMIN — HYDRALAZINE HYDROCHLORIDE 100 MG: 50 TABLET, FILM COATED ORAL at 21:32

## 2022-06-28 RX ADMIN — LEVOTHYROXINE SODIUM 150 MCG: 0.15 TABLET ORAL at 05:35

## 2022-06-28 RX ADMIN — OXYCODONE 2.5 MG: 5 TABLET ORAL at 00:24

## 2022-06-28 RX ADMIN — INSULIN GLARGINE-YFGN 35 UNITS: 100 INJECTION, SOLUTION SUBCUTANEOUS at 08:15

## 2022-06-28 RX ADMIN — HYDRALAZINE HYDROCHLORIDE 100 MG: 50 TABLET, FILM COATED ORAL at 14:10

## 2022-06-28 RX ADMIN — GABAPENTIN 300 MG: 300 CAPSULE ORAL at 21:32

## 2022-06-28 RX ADMIN — HYDRALAZINE HYDROCHLORIDE 10 MG: 10 TABLET, FILM COATED ORAL at 01:59

## 2022-06-28 RX ADMIN — INSULIN LISPRO 5 UNITS: 100 INJECTION, SOLUTION INTRAVENOUS; SUBCUTANEOUS at 12:06

## 2022-06-28 RX ADMIN — ROPINIROLE HYDROCHLORIDE 0.75 MG: 0.5 TABLET, FILM COATED ORAL at 21:32

## 2022-06-28 RX ADMIN — CARVEDILOL 25 MG: 25 TABLET, FILM COATED ORAL at 08:16

## 2022-06-28 RX ADMIN — ASPIRIN 81 MG: 81 TABLET, COATED ORAL at 08:14

## 2022-06-28 RX ADMIN — HYDRALAZINE HYDROCHLORIDE 100 MG: 50 TABLET, FILM COATED ORAL at 05:34

## 2022-06-28 RX ADMIN — INSULIN LISPRO 5 UNITS: 100 INJECTION, SOLUTION INTRAVENOUS; SUBCUTANEOUS at 08:17

## 2022-06-28 RX ADMIN — INSULIN LISPRO 5 UNITS: 100 INJECTION, SOLUTION INTRAVENOUS; SUBCUTANEOUS at 16:39

## 2022-06-28 RX ADMIN — INSULIN GLARGINE-YFGN 35 UNITS: 100 INJECTION, SOLUTION SUBCUTANEOUS at 21:34

## 2022-06-28 RX ADMIN — DIPHENOXYLATE HYDROCHLORIDE AND ATROPINE SULFATE 1 TABLET: 2.5; .025 TABLET ORAL at 10:50

## 2022-06-28 RX ADMIN — OXYCODONE 5 MG: 5 TABLET ORAL at 21:30

## 2022-06-28 RX ADMIN — OXYCODONE 2.5 MG: 5 TABLET ORAL at 03:25

## 2022-06-28 RX ADMIN — SERTRALINE 100 MG: 100 TABLET, FILM COATED ORAL at 08:15

## 2022-06-28 RX ADMIN — PANTOPRAZOLE SODIUM 40 MG: 40 TABLET, DELAYED RELEASE ORAL at 05:34

## 2022-06-28 RX ADMIN — OXYCODONE 2.5 MG: 5 TABLET ORAL at 09:10

## 2022-06-28 RX ADMIN — GABAPENTIN 300 MG: 300 CAPSULE ORAL at 08:15

## 2022-06-28 RX ADMIN — CARVEDILOL 25 MG: 25 TABLET, FILM COATED ORAL at 16:39

## 2022-06-28 RX ADMIN — OXYCODONE 5 MG: 5 TABLET ORAL at 12:54

## 2022-06-28 RX ADMIN — ACETAMINOPHEN 500 MG: 500 TABLET, FILM COATED ORAL at 09:11

## 2022-06-28 RX ADMIN — LIDOCAINE 1 PATCH: 50 PATCH CUTANEOUS at 08:16

## 2022-06-28 RX ADMIN — AMLODIPINE BESYLATE 10 MG: 10 TABLET ORAL at 08:16

## 2022-06-28 RX ADMIN — LOSARTAN POTASSIUM 100 MG: 100 TABLET, FILM COATED ORAL at 08:15

## 2022-06-29 LAB
ALBUMIN SERPL-MCNC: 3.3 G/DL (ref 3.5–5.2)
ALP SERPL-CCNC: 511 U/L (ref 39–117)
ALT SERPL W P-5'-P-CCNC: 13 U/L (ref 1–33)
ANION GAP SERPL CALCULATED.3IONS-SCNC: 17 MMOL/L (ref 5–15)
AST SERPL-CCNC: 29 U/L (ref 1–32)
BASOPHILS # BLD AUTO: 0.04 10*3/MM3 (ref 0–0.2)
BASOPHILS NFR BLD AUTO: 0.2 % (ref 0–1.5)
BILIRUB CONJ SERPL-MCNC: 0.3 MG/DL (ref 0–0.3)
BILIRUB INDIRECT SERPL-MCNC: 0.2 MG/DL
BILIRUB SERPL-MCNC: 0.5 MG/DL (ref 0–1.2)
BUN SERPL-MCNC: 65 MG/DL (ref 6–20)
BUN/CREAT SERPL: 18.2 (ref 7–25)
CALCIUM SPEC-SCNC: 8.1 MG/DL (ref 8.6–10.5)
CHLORIDE SERPL-SCNC: 99 MMOL/L (ref 98–107)
CO2 SERPL-SCNC: 20 MMOL/L (ref 22–29)
CREAT SERPL-MCNC: 3.58 MG/DL (ref 0.57–1)
DEPRECATED RDW RBC AUTO: 54.9 FL (ref 37–54)
EGFRCR SERPLBLD CKD-EPI 2021: 14.3 ML/MIN/1.73
EOSINOPHIL # BLD AUTO: 0 10*3/MM3 (ref 0–0.4)
EOSINOPHIL NFR BLD AUTO: 0 % (ref 0.3–6.2)
ERYTHROCYTE [DISTWIDTH] IN BLOOD BY AUTOMATED COUNT: 18.1 % (ref 12.3–15.4)
GLUCOSE BLDC GLUCOMTR-MCNC: 101 MG/DL (ref 70–130)
GLUCOSE BLDC GLUCOMTR-MCNC: 177 MG/DL (ref 70–130)
GLUCOSE BLDC GLUCOMTR-MCNC: 219 MG/DL (ref 70–130)
GLUCOSE BLDC GLUCOMTR-MCNC: 292 MG/DL (ref 70–130)
GLUCOSE BLDC GLUCOMTR-MCNC: 314 MG/DL (ref 70–130)
GLUCOSE BLDC GLUCOMTR-MCNC: 56 MG/DL (ref 70–130)
GLUCOSE SERPL-MCNC: 168 MG/DL (ref 65–99)
HCT VFR BLD AUTO: 28.5 % (ref 34–46.6)
HGB BLD-MCNC: 8.7 G/DL (ref 12–15.9)
IMM GRANULOCYTES # BLD AUTO: 0.41 10*3/MM3 (ref 0–0.05)
IMM GRANULOCYTES NFR BLD AUTO: 1.7 % (ref 0–0.5)
LYMPHOCYTES # BLD AUTO: 0.94 10*3/MM3 (ref 0.7–3.1)
LYMPHOCYTES NFR BLD AUTO: 4 % (ref 19.6–45.3)
MCH RBC QN AUTO: 25.8 PG (ref 26.6–33)
MCHC RBC AUTO-ENTMCNC: 30.5 G/DL (ref 31.5–35.7)
MCV RBC AUTO: 84.6 FL (ref 79–97)
MONOCYTES # BLD AUTO: 0.58 10*3/MM3 (ref 0.1–0.9)
MONOCYTES NFR BLD AUTO: 2.5 % (ref 5–12)
NEUTROPHILS NFR BLD AUTO: 21.64 10*3/MM3 (ref 1.7–7)
NEUTROPHILS NFR BLD AUTO: 91.6 % (ref 42.7–76)
NRBC BLD AUTO-RTO: 0 /100 WBC (ref 0–0.2)
PHOSPHATE SERPL-MCNC: 4 MG/DL (ref 2.5–4.5)
PLATELET # BLD AUTO: 340 10*3/MM3 (ref 140–450)
PMV BLD AUTO: 9.9 FL (ref 6–12)
POTASSIUM SERPL-SCNC: 6.6 MMOL/L (ref 3.5–5.2)
PROT SERPL-MCNC: 6.3 G/DL (ref 6–8.5)
RBC # BLD AUTO: 3.37 10*6/MM3 (ref 3.77–5.28)
SODIUM SERPL-SCNC: 136 MMOL/L (ref 136–145)
WBC NRBC COR # BLD: 23.61 10*3/MM3 (ref 3.4–10.8)

## 2022-06-29 PROCEDURE — 63710000001 INSULIN LISPRO (HUMAN) PER 5 UNITS: Performed by: STUDENT IN AN ORGANIZED HEALTH CARE EDUCATION/TRAINING PROGRAM

## 2022-06-29 PROCEDURE — 97535 SELF CARE MNGMENT TRAINING: CPT

## 2022-06-29 PROCEDURE — 97110 THERAPEUTIC EXERCISES: CPT | Performed by: OCCUPATIONAL THERAPIST

## 2022-06-29 PROCEDURE — 63710000001 PREDNISONE PER 5 MG: Performed by: NURSE PRACTITIONER

## 2022-06-29 PROCEDURE — 80076 HEPATIC FUNCTION PANEL: CPT | Performed by: PHYSICAL MEDICINE & REHABILITATION

## 2022-06-29 PROCEDURE — 80048 BASIC METABOLIC PNL TOTAL CA: CPT | Performed by: INTERNAL MEDICINE

## 2022-06-29 PROCEDURE — 84100 ASSAY OF PHOSPHORUS: CPT | Performed by: INTERNAL MEDICINE

## 2022-06-29 PROCEDURE — 85025 COMPLETE CBC W/AUTO DIFF WBC: CPT | Performed by: PHYSICAL MEDICINE & REHABILITATION

## 2022-06-29 PROCEDURE — 97110 THERAPEUTIC EXERCISES: CPT

## 2022-06-29 PROCEDURE — 82962 GLUCOSE BLOOD TEST: CPT

## 2022-06-29 RX ORDER — INSULIN LISPRO 100 [IU]/ML
8 INJECTION, SOLUTION INTRAVENOUS; SUBCUTANEOUS
Status: DISCONTINUED | OUTPATIENT
Start: 2022-06-29 | End: 2022-06-30

## 2022-06-29 RX ORDER — ALBUMIN (HUMAN) 12.5 G/50ML
12.5 SOLUTION INTRAVENOUS AS NEEDED
Status: ACTIVE | OUTPATIENT
Start: 2022-06-29 | End: 2022-06-30

## 2022-06-29 RX ORDER — CLONIDINE HYDROCHLORIDE 0.1 MG/1
0.1 TABLET ORAL EVERY 12 HOURS SCHEDULED
Status: DISCONTINUED | OUTPATIENT
Start: 2022-06-29 | End: 2022-06-30

## 2022-06-29 RX ORDER — ALBUMIN (HUMAN) 12.5 G/50ML
12.5 SOLUTION INTRAVENOUS AS NEEDED
Status: DISCONTINUED | OUTPATIENT
Start: 2022-06-29 | End: 2022-06-29

## 2022-06-29 RX ADMIN — INSULIN GLARGINE-YFGN 35 UNITS: 100 INJECTION, SOLUTION SUBCUTANEOUS at 21:57

## 2022-06-29 RX ADMIN — OXYCODONE 5 MG: 5 TABLET ORAL at 10:25

## 2022-06-29 RX ADMIN — TAMSULOSIN HYDROCHLORIDE 0.4 MG: 0.4 CAPSULE ORAL at 08:01

## 2022-06-29 RX ADMIN — CLONIDINE HYDROCHLORIDE 0.1 MG: 0.1 TABLET ORAL at 21:57

## 2022-06-29 RX ADMIN — CLONIDINE HYDROCHLORIDE 0.1 MG: 0.1 TABLET ORAL at 12:11

## 2022-06-29 RX ADMIN — ROPINIROLE HYDROCHLORIDE 0.75 MG: 0.5 TABLET, FILM COATED ORAL at 21:57

## 2022-06-29 RX ADMIN — ZOLPIDEM TARTRATE 5 MG: 5 TABLET ORAL at 21:57

## 2022-06-29 RX ADMIN — OXYCODONE 5 MG: 5 TABLET ORAL at 05:44

## 2022-06-29 RX ADMIN — INSULIN LISPRO 5 UNITS: 100 INJECTION, SOLUTION INTRAVENOUS; SUBCUTANEOUS at 12:05

## 2022-06-29 RX ADMIN — ACETAMINOPHEN 500 MG: 500 TABLET, FILM COATED ORAL at 09:03

## 2022-06-29 RX ADMIN — HYDRALAZINE HYDROCHLORIDE 100 MG: 50 TABLET, FILM COATED ORAL at 21:56

## 2022-06-29 RX ADMIN — CARVEDILOL 25 MG: 25 TABLET, FILM COATED ORAL at 08:01

## 2022-06-29 RX ADMIN — Medication 1 TABLET: at 08:01

## 2022-06-29 RX ADMIN — PANTOPRAZOLE SODIUM 40 MG: 40 TABLET, DELAYED RELEASE ORAL at 05:44

## 2022-06-29 RX ADMIN — CARVEDILOL 25 MG: 25 TABLET, FILM COATED ORAL at 19:24

## 2022-06-29 RX ADMIN — SERTRALINE 150 MG: 100 TABLET, FILM COATED ORAL at 08:01

## 2022-06-29 RX ADMIN — OXYCODONE 5 MG: 5 TABLET ORAL at 19:21

## 2022-06-29 RX ADMIN — ASPIRIN 81 MG: 81 TABLET, COATED ORAL at 08:01

## 2022-06-29 RX ADMIN — PREDNISONE 60 MG: 50 TABLET ORAL at 08:01

## 2022-06-29 RX ADMIN — DOCUSATE SODIUM 50MG AND SENNOSIDES 8.6MG 1 TABLET: 8.6; 5 TABLET, FILM COATED ORAL at 08:01

## 2022-06-29 RX ADMIN — LEVOTHYROXINE SODIUM 150 MCG: 0.15 TABLET ORAL at 05:44

## 2022-06-29 RX ADMIN — LOSARTAN POTASSIUM 100 MG: 100 TABLET, FILM COATED ORAL at 08:01

## 2022-06-29 RX ADMIN — INSULIN GLARGINE-YFGN 35 UNITS: 100 INJECTION, SOLUTION SUBCUTANEOUS at 08:00

## 2022-06-29 RX ADMIN — HYDRALAZINE HYDROCHLORIDE 100 MG: 50 TABLET, FILM COATED ORAL at 05:44

## 2022-06-29 RX ADMIN — INSULIN LISPRO 5 UNITS: 100 INJECTION, SOLUTION INTRAVENOUS; SUBCUTANEOUS at 08:00

## 2022-06-29 RX ADMIN — GABAPENTIN 300 MG: 300 CAPSULE ORAL at 08:01

## 2022-06-29 RX ADMIN — GABAPENTIN 300 MG: 300 CAPSULE ORAL at 21:57

## 2022-06-29 RX ADMIN — AMLODIPINE BESYLATE 10 MG: 10 TABLET ORAL at 08:01

## 2022-06-29 RX ADMIN — LIDOCAINE 1 PATCH: 50 PATCH CUTANEOUS at 08:01

## 2022-06-30 LAB
ALBUMIN SERPL-MCNC: 2.6 G/DL (ref 3.5–5.2)
ALP SERPL-CCNC: 426 U/L (ref 39–117)
ALT SERPL W P-5'-P-CCNC: 15 U/L (ref 1–33)
ANION GAP SERPL CALCULATED.3IONS-SCNC: 11 MMOL/L (ref 5–15)
AST SERPL-CCNC: 30 U/L (ref 1–32)
BASOPHILS # BLD AUTO: 0.02 10*3/MM3 (ref 0–0.2)
BASOPHILS NFR BLD AUTO: 0.1 % (ref 0–1.5)
BILIRUB CONJ SERPL-MCNC: 0.3 MG/DL (ref 0–0.3)
BILIRUB INDIRECT SERPL-MCNC: 0.1 MG/DL
BILIRUB SERPL-MCNC: 0.4 MG/DL (ref 0–1.2)
BUN SERPL-MCNC: 43 MG/DL (ref 6–20)
BUN/CREAT SERPL: 16.5 (ref 7–25)
CALCIUM SPEC-SCNC: 7.6 MG/DL (ref 8.6–10.5)
CHLORIDE SERPL-SCNC: 100 MMOL/L (ref 98–107)
CO2 SERPL-SCNC: 23 MMOL/L (ref 22–29)
CREAT SERPL-MCNC: 2.61 MG/DL (ref 0.57–1)
DEPRECATED RDW RBC AUTO: 53.9 FL (ref 37–54)
EGFRCR SERPLBLD CKD-EPI 2021: 21 ML/MIN/1.73
EOSINOPHIL # BLD AUTO: 0.03 10*3/MM3 (ref 0–0.4)
EOSINOPHIL NFR BLD AUTO: 0.2 % (ref 0.3–6.2)
ERYTHROCYTE [DISTWIDTH] IN BLOOD BY AUTOMATED COUNT: 18.1 % (ref 12.3–15.4)
GLUCOSE BLDC GLUCOMTR-MCNC: 100 MG/DL (ref 70–130)
GLUCOSE BLDC GLUCOMTR-MCNC: 117 MG/DL (ref 70–130)
GLUCOSE BLDC GLUCOMTR-MCNC: 181 MG/DL (ref 70–130)
GLUCOSE BLDC GLUCOMTR-MCNC: 197 MG/DL (ref 70–130)
GLUCOSE BLDC GLUCOMTR-MCNC: 251 MG/DL (ref 70–130)
GLUCOSE BLDC GLUCOMTR-MCNC: 43 MG/DL (ref 70–130)
GLUCOSE BLDC GLUCOMTR-MCNC: 55 MG/DL (ref 70–130)
GLUCOSE BLDC GLUCOMTR-MCNC: 55 MG/DL (ref 70–130)
GLUCOSE BLDC GLUCOMTR-MCNC: 68 MG/DL (ref 70–130)
GLUCOSE SERPL-MCNC: 91 MG/DL (ref 65–99)
HCT VFR BLD AUTO: 27.2 % (ref 34–46.6)
HGB BLD-MCNC: 8.5 G/DL (ref 12–15.9)
IMM GRANULOCYTES # BLD AUTO: 0.26 10*3/MM3 (ref 0–0.05)
IMM GRANULOCYTES NFR BLD AUTO: 1.3 % (ref 0–0.5)
LYMPHOCYTES # BLD AUTO: 2.23 10*3/MM3 (ref 0.7–3.1)
LYMPHOCYTES NFR BLD AUTO: 11.2 % (ref 19.6–45.3)
MCH RBC QN AUTO: 26.2 PG (ref 26.6–33)
MCHC RBC AUTO-ENTMCNC: 31.3 G/DL (ref 31.5–35.7)
MCV RBC AUTO: 83.7 FL (ref 79–97)
MONOCYTES # BLD AUTO: 1.07 10*3/MM3 (ref 0.1–0.9)
MONOCYTES NFR BLD AUTO: 5.4 % (ref 5–12)
NEUTROPHILS NFR BLD AUTO: 16.3 10*3/MM3 (ref 1.7–7)
NEUTROPHILS NFR BLD AUTO: 81.8 % (ref 42.7–76)
NRBC BLD AUTO-RTO: 0.1 /100 WBC (ref 0–0.2)
PHOSPHATE SERPL-MCNC: 4 MG/DL (ref 2.5–4.5)
PLATELET # BLD AUTO: 315 10*3/MM3 (ref 140–450)
PMV BLD AUTO: 10 FL (ref 6–12)
POTASSIUM SERPL-SCNC: 5.3 MMOL/L (ref 3.5–5.2)
PROT SERPL-MCNC: 5.9 G/DL (ref 6–8.5)
RBC # BLD AUTO: 3.25 10*6/MM3 (ref 3.77–5.28)
SODIUM SERPL-SCNC: 134 MMOL/L (ref 136–145)
WBC NRBC COR # BLD: 19.91 10*3/MM3 (ref 3.4–10.8)

## 2022-06-30 PROCEDURE — 80048 BASIC METABOLIC PNL TOTAL CA: CPT | Performed by: INTERNAL MEDICINE

## 2022-06-30 PROCEDURE — 84100 ASSAY OF PHOSPHORUS: CPT | Performed by: INTERNAL MEDICINE

## 2022-06-30 PROCEDURE — 80076 HEPATIC FUNCTION PANEL: CPT | Performed by: PHYSICAL MEDICINE & REHABILITATION

## 2022-06-30 PROCEDURE — 63710000001 PREDNISONE PER 5 MG: Performed by: NURSE PRACTITIONER

## 2022-06-30 PROCEDURE — 97535 SELF CARE MNGMENT TRAINING: CPT

## 2022-06-30 PROCEDURE — 63710000001 INSULIN LISPRO (HUMAN) PER 5 UNITS: Performed by: HOSPITALIST

## 2022-06-30 PROCEDURE — 82962 GLUCOSE BLOOD TEST: CPT

## 2022-06-30 PROCEDURE — 85025 COMPLETE CBC W/AUTO DIFF WBC: CPT | Performed by: PHYSICAL MEDICINE & REHABILITATION

## 2022-06-30 PROCEDURE — 97110 THERAPEUTIC EXERCISES: CPT

## 2022-06-30 PROCEDURE — 97530 THERAPEUTIC ACTIVITIES: CPT

## 2022-06-30 RX ORDER — INSULIN LISPRO 100 [IU]/ML
0-7 INJECTION, SOLUTION INTRAVENOUS; SUBCUTANEOUS
Status: DISCONTINUED | OUTPATIENT
Start: 2022-06-30 | End: 2022-07-18

## 2022-06-30 RX ORDER — CLONIDINE HYDROCHLORIDE 0.1 MG/1
0.2 TABLET ORAL EVERY 12 HOURS SCHEDULED
Status: DISCONTINUED | OUTPATIENT
Start: 2022-06-30 | End: 2022-07-18

## 2022-06-30 RX ORDER — NICOTINE POLACRILEX 4 MG
15 LOZENGE BUCCAL
Status: DISCONTINUED | OUTPATIENT
Start: 2022-06-30 | End: 2022-07-22 | Stop reason: HOSPADM

## 2022-06-30 RX ORDER — DEXTROSE MONOHYDRATE 25 G/50ML
25 INJECTION, SOLUTION INTRAVENOUS
Status: DISCONTINUED | OUTPATIENT
Start: 2022-06-30 | End: 2022-07-22 | Stop reason: HOSPADM

## 2022-06-30 RX ADMIN — INSULIN LISPRO 2 UNITS: 100 INJECTION, SOLUTION INTRAVENOUS; SUBCUTANEOUS at 17:23

## 2022-06-30 RX ADMIN — LIDOCAINE 1 PATCH: 50 PATCH CUTANEOUS at 08:08

## 2022-06-30 RX ADMIN — Medication 1 TABLET: at 08:09

## 2022-06-30 RX ADMIN — GABAPENTIN 300 MG: 300 CAPSULE ORAL at 08:16

## 2022-06-30 RX ADMIN — SERTRALINE 150 MG: 100 TABLET, FILM COATED ORAL at 08:08

## 2022-06-30 RX ADMIN — CARVEDILOL 25 MG: 25 TABLET, FILM COATED ORAL at 17:24

## 2022-06-30 RX ADMIN — CARVEDILOL 25 MG: 25 TABLET, FILM COATED ORAL at 07:48

## 2022-06-30 RX ADMIN — LEVOTHYROXINE SODIUM 150 MCG: 0.15 TABLET ORAL at 06:32

## 2022-06-30 RX ADMIN — TAMSULOSIN HYDROCHLORIDE 0.4 MG: 0.4 CAPSULE ORAL at 08:10

## 2022-06-30 RX ADMIN — CLONIDINE HYDROCHLORIDE 0.2 MG: 0.1 TABLET ORAL at 22:02

## 2022-06-30 RX ADMIN — HYDRALAZINE HYDROCHLORIDE 100 MG: 50 TABLET, FILM COATED ORAL at 14:37

## 2022-06-30 RX ADMIN — INSULIN GLARGINE-YFGN 20 UNITS: 100 INJECTION, SOLUTION SUBCUTANEOUS at 22:04

## 2022-06-30 RX ADMIN — OXYCODONE 5 MG: 5 TABLET ORAL at 22:03

## 2022-06-30 RX ADMIN — OXYCODONE 5 MG: 5 TABLET ORAL at 17:27

## 2022-06-30 RX ADMIN — PANTOPRAZOLE SODIUM 40 MG: 40 TABLET, DELAYED RELEASE ORAL at 06:33

## 2022-06-30 RX ADMIN — ROPINIROLE HYDROCHLORIDE 0.75 MG: 0.5 TABLET, FILM COATED ORAL at 22:03

## 2022-06-30 RX ADMIN — HYDRALAZINE HYDROCHLORIDE 100 MG: 50 TABLET, FILM COATED ORAL at 22:03

## 2022-06-30 RX ADMIN — LOSARTAN POTASSIUM 100 MG: 100 TABLET, FILM COATED ORAL at 08:10

## 2022-06-30 RX ADMIN — PREDNISONE 60 MG: 50 TABLET ORAL at 07:48

## 2022-06-30 RX ADMIN — INSULIN GLARGINE-YFGN 35 UNITS: 100 INJECTION, SOLUTION SUBCUTANEOUS at 08:16

## 2022-06-30 RX ADMIN — HYDRALAZINE HYDROCHLORIDE 100 MG: 50 TABLET, FILM COATED ORAL at 06:32

## 2022-06-30 RX ADMIN — GABAPENTIN 300 MG: 300 CAPSULE ORAL at 22:03

## 2022-06-30 RX ADMIN — SODIUM ZIRCONIUM CYCLOSILICATE 10 G: 10 POWDER, FOR SUSPENSION ORAL at 14:36

## 2022-06-30 RX ADMIN — ZOLPIDEM TARTRATE 5 MG: 5 TABLET ORAL at 22:04

## 2022-06-30 RX ADMIN — INSULIN LISPRO 8 UNITS: 100 INJECTION, SOLUTION INTRAVENOUS; SUBCUTANEOUS at 07:48

## 2022-06-30 RX ADMIN — CLONIDINE HYDROCHLORIDE 0.1 MG: 0.1 TABLET ORAL at 08:16

## 2022-06-30 RX ADMIN — OXYCODONE 5 MG: 5 TABLET ORAL at 06:33

## 2022-06-30 RX ADMIN — OXYCODONE 5 MG: 5 TABLET ORAL at 12:32

## 2022-06-30 RX ADMIN — ASPIRIN 81 MG: 81 TABLET, COATED ORAL at 08:19

## 2022-07-01 ENCOUNTER — TELEPHONE (OUTPATIENT)
Dept: NEUROSURGERY | Facility: CLINIC | Age: 57
End: 2022-07-01

## 2022-07-01 PROBLEM — E11.69: Status: ACTIVE | Noted: 2022-07-01

## 2022-07-01 PROBLEM — G47.09 OTHER INSOMNIA: Status: ACTIVE | Noted: 2022-01-01

## 2022-07-01 PROBLEM — M62.20: Status: ACTIVE | Noted: 2022-07-01

## 2022-07-01 LAB
ALBUMIN SERPL-MCNC: 3.2 G/DL (ref 3.5–5.2)
ALP SERPL-CCNC: 450 U/L (ref 39–117)
ALT SERPL W P-5'-P-CCNC: 18 U/L (ref 1–33)
ANION GAP SERPL CALCULATED.3IONS-SCNC: 13 MMOL/L (ref 5–15)
AST SERPL-CCNC: 33 U/L (ref 1–32)
BASOPHILS # BLD AUTO: 0.03 10*3/MM3 (ref 0–0.2)
BASOPHILS NFR BLD AUTO: 0.1 % (ref 0–1.5)
BILIRUB CONJ SERPL-MCNC: 0.2 MG/DL (ref 0–0.3)
BILIRUB INDIRECT SERPL-MCNC: 0.3 MG/DL
BILIRUB SERPL-MCNC: 0.5 MG/DL (ref 0–1.2)
BUN SERPL-MCNC: 34 MG/DL (ref 6–20)
BUN/CREAT SERPL: 15.4 (ref 7–25)
CALCIUM SPEC-SCNC: 7.9 MG/DL (ref 8.6–10.5)
CHLORIDE SERPL-SCNC: 97 MMOL/L (ref 98–107)
CO2 SERPL-SCNC: 24 MMOL/L (ref 22–29)
CREAT SERPL-MCNC: 2.21 MG/DL (ref 0.57–1)
DEPRECATED RDW RBC AUTO: 56.4 FL (ref 37–54)
EGFRCR SERPLBLD CKD-EPI 2021: 25.6 ML/MIN/1.73
EOSINOPHIL # BLD AUTO: 0 10*3/MM3 (ref 0–0.4)
EOSINOPHIL NFR BLD AUTO: 0 % (ref 0.3–6.2)
ERYTHROCYTE [DISTWIDTH] IN BLOOD BY AUTOMATED COUNT: 19 % (ref 12.3–15.4)
GLUCOSE BLDC GLUCOMTR-MCNC: 126 MG/DL (ref 70–130)
GLUCOSE BLDC GLUCOMTR-MCNC: 139 MG/DL (ref 70–130)
GLUCOSE BLDC GLUCOMTR-MCNC: 186 MG/DL (ref 70–130)
GLUCOSE BLDC GLUCOMTR-MCNC: 287 MG/DL (ref 70–130)
GLUCOSE BLDC GLUCOMTR-MCNC: 95 MG/DL (ref 70–130)
GLUCOSE SERPL-MCNC: 75 MG/DL (ref 65–99)
HCT VFR BLD AUTO: 30 % (ref 34–46.6)
HGB BLD-MCNC: 9.4 G/DL (ref 12–15.9)
IMM GRANULOCYTES # BLD AUTO: 0.3 10*3/MM3 (ref 0–0.05)
IMM GRANULOCYTES NFR BLD AUTO: 1.2 % (ref 0–0.5)
LYMPHOCYTES # BLD AUTO: 0.65 10*3/MM3 (ref 0.7–3.1)
LYMPHOCYTES NFR BLD AUTO: 2.7 % (ref 19.6–45.3)
MCH RBC QN AUTO: 26.1 PG (ref 26.6–33)
MCHC RBC AUTO-ENTMCNC: 31.3 G/DL (ref 31.5–35.7)
MCV RBC AUTO: 83.3 FL (ref 79–97)
MONOCYTES # BLD AUTO: 0.34 10*3/MM3 (ref 0.1–0.9)
MONOCYTES NFR BLD AUTO: 1.4 % (ref 5–12)
NEUTROPHILS NFR BLD AUTO: 22.89 10*3/MM3 (ref 1.7–7)
NEUTROPHILS NFR BLD AUTO: 94.6 % (ref 42.7–76)
NRBC BLD AUTO-RTO: 0 /100 WBC (ref 0–0.2)
PHOSPHATE SERPL-MCNC: 3.2 MG/DL (ref 2.5–4.5)
PLATELET # BLD AUTO: 288 10*3/MM3 (ref 140–450)
PMV BLD AUTO: 10.3 FL (ref 6–12)
POTASSIUM SERPL-SCNC: 5.4 MMOL/L (ref 3.5–5.2)
PROT SERPL-MCNC: 6 G/DL (ref 6–8.5)
RBC # BLD AUTO: 3.6 10*6/MM3 (ref 3.77–5.28)
SODIUM SERPL-SCNC: 134 MMOL/L (ref 136–145)
WBC NRBC COR # BLD: 24.21 10*3/MM3 (ref 3.4–10.8)

## 2022-07-01 PROCEDURE — 82962 GLUCOSE BLOOD TEST: CPT

## 2022-07-01 PROCEDURE — 80076 HEPATIC FUNCTION PANEL: CPT | Performed by: PHYSICAL MEDICINE & REHABILITATION

## 2022-07-01 PROCEDURE — 25010000002 HEPARIN (PORCINE) PER 1000 UNITS: Performed by: PHYSICAL MEDICINE & REHABILITATION

## 2022-07-01 PROCEDURE — 63710000001 PREDNISONE PER 5 MG: Performed by: NURSE PRACTITIONER

## 2022-07-01 PROCEDURE — 80048 BASIC METABOLIC PNL TOTAL CA: CPT | Performed by: INTERNAL MEDICINE

## 2022-07-01 PROCEDURE — 85025 COMPLETE CBC W/AUTO DIFF WBC: CPT | Performed by: PHYSICAL MEDICINE & REHABILITATION

## 2022-07-01 PROCEDURE — 25010000002 EPOETIN ALFA-EPBX 10000 UNIT/ML SOLUTION: Performed by: PHYSICAL MEDICINE & REHABILITATION

## 2022-07-01 PROCEDURE — 84100 ASSAY OF PHOSPHORUS: CPT | Performed by: INTERNAL MEDICINE

## 2022-07-01 RX ORDER — LEVOTHYROXINE SODIUM 0.15 MG/1
150 TABLET ORAL
Qty: 30 TABLET | Refills: 1 | Status: SHIPPED | OUTPATIENT
Start: 2022-07-02 | End: 2022-07-22 | Stop reason: HOSPADM

## 2022-07-01 RX ORDER — LIDOCAINE 50 MG/G
1 PATCH TOPICAL
Status: ON HOLD
Start: 2022-07-01 | End: 2022-08-20

## 2022-07-01 RX ORDER — OXYCODONE HYDROCHLORIDE 5 MG/1
5 TABLET ORAL EVERY 4 HOURS PRN
Qty: 40 TABLET | Refills: 0 | Status: SHIPPED | OUTPATIENT
Start: 2022-07-01 | End: 2022-07-22 | Stop reason: SDUPTHER

## 2022-07-01 RX ORDER — FOLIC ACID/VIT B COMPLEX AND C 0.8 MG
1 TABLET ORAL DAILY
Qty: 90 TABLET | Refills: 1 | Status: SHIPPED | OUTPATIENT
Start: 2022-07-02 | End: 2022-07-25 | Stop reason: SDUPTHER

## 2022-07-01 RX ORDER — PANTOPRAZOLE SODIUM 40 MG/1
40 TABLET, DELAYED RELEASE ORAL DAILY
Qty: 90 TABLET | Refills: 0 | Status: SHIPPED | OUTPATIENT
Start: 2022-07-01

## 2022-07-01 RX ORDER — ASPIRIN 81 MG/1
81 TABLET ORAL DAILY
Qty: 90 TABLET | Refills: 1 | Status: SHIPPED | OUTPATIENT
Start: 2022-07-01 | End: 2022-07-22 | Stop reason: SDUPTHER

## 2022-07-01 RX ORDER — LOSARTAN POTASSIUM 100 MG/1
100 TABLET ORAL
Qty: 30 TABLET | Refills: 1 | Status: SHIPPED | OUTPATIENT
Start: 2022-07-01 | End: 2022-10-06 | Stop reason: HOSPADM

## 2022-07-01 RX ORDER — GABAPENTIN 300 MG/1
300 CAPSULE ORAL 2 TIMES DAILY
Qty: 180 CAPSULE | Refills: 1 | Status: SHIPPED | OUTPATIENT
Start: 2022-07-01 | End: 2022-07-22 | Stop reason: SDUPTHER

## 2022-07-01 RX ORDER — ZOLPIDEM TARTRATE 5 MG/1
5 TABLET ORAL NIGHTLY PRN
Qty: 30 TABLET | Refills: 0 | Status: SHIPPED | OUTPATIENT
Start: 2022-07-01 | End: 2022-07-22 | Stop reason: HOSPADM

## 2022-07-01 RX ORDER — CARVEDILOL 25 MG/1
25 TABLET ORAL 2 TIMES DAILY WITH MEALS
Qty: 60 TABLET | Refills: 1 | Status: SHIPPED | OUTPATIENT
Start: 2022-07-01 | End: 2022-08-04 | Stop reason: HOSPADM

## 2022-07-01 RX ORDER — ACETAMINOPHEN 500 MG
500 TABLET ORAL EVERY 6 HOURS PRN
Start: 2022-07-01 | End: 2022-08-04 | Stop reason: HOSPADM

## 2022-07-01 RX ORDER — CLONIDINE HYDROCHLORIDE 0.2 MG/1
0.2 TABLET ORAL EVERY 12 HOURS SCHEDULED
Qty: 60 TABLET | Refills: 1 | Status: SHIPPED | OUTPATIENT
Start: 2022-07-01 | End: 2022-07-22 | Stop reason: HOSPADM

## 2022-07-01 RX ORDER — HYDRALAZINE HYDROCHLORIDE 100 MG/1
100 TABLET, FILM COATED ORAL EVERY 8 HOURS SCHEDULED
Qty: 90 TABLET | Refills: 1 | Status: SHIPPED | OUTPATIENT
Start: 2022-07-01 | End: 2022-09-13 | Stop reason: HOSPADM

## 2022-07-01 RX ADMIN — TAMSULOSIN HYDROCHLORIDE 0.4 MG: 0.4 CAPSULE ORAL at 08:00

## 2022-07-01 RX ADMIN — Medication 1 TABLET: at 08:00

## 2022-07-01 RX ADMIN — SERTRALINE 150 MG: 100 TABLET, FILM COATED ORAL at 08:00

## 2022-07-01 RX ADMIN — HYDRALAZINE HYDROCHLORIDE 100 MG: 50 TABLET, FILM COATED ORAL at 14:28

## 2022-07-01 RX ADMIN — HEPARIN SODIUM 3800 UNITS: 1000 INJECTION INTRAVENOUS; SUBCUTANEOUS at 11:18

## 2022-07-01 RX ADMIN — OXYCODONE 5 MG: 5 TABLET ORAL at 18:51

## 2022-07-01 RX ADMIN — OXYCODONE 5 MG: 5 TABLET ORAL at 23:47

## 2022-07-01 RX ADMIN — LEVOTHYROXINE SODIUM 150 MCG: 0.15 TABLET ORAL at 06:32

## 2022-07-01 RX ADMIN — PREDNISONE 60 MG: 50 TABLET ORAL at 07:46

## 2022-07-01 RX ADMIN — GABAPENTIN 300 MG: 300 CAPSULE ORAL at 22:15

## 2022-07-01 RX ADMIN — INSULIN GLARGINE-YFGN 20 UNITS: 100 INJECTION, SOLUTION SUBCUTANEOUS at 07:45

## 2022-07-01 RX ADMIN — INSULIN GLARGINE-YFGN 10 UNITS: 100 INJECTION, SOLUTION SUBCUTANEOUS at 22:16

## 2022-07-01 RX ADMIN — OXYCODONE 5 MG: 5 TABLET ORAL at 06:36

## 2022-07-01 RX ADMIN — HYDRALAZINE HYDROCHLORIDE 100 MG: 50 TABLET, FILM COATED ORAL at 23:47

## 2022-07-01 RX ADMIN — CLONIDINE HYDROCHLORIDE 0.2 MG: 0.1 TABLET ORAL at 22:15

## 2022-07-01 RX ADMIN — ZOLPIDEM TARTRATE 5 MG: 5 TABLET ORAL at 22:31

## 2022-07-01 RX ADMIN — EPOETIN ALFA-EPBX 10000 UNITS: 10000 INJECTION, SOLUTION INTRAVENOUS; SUBCUTANEOUS at 11:18

## 2022-07-01 RX ADMIN — PANTOPRAZOLE SODIUM 40 MG: 40 TABLET, DELAYED RELEASE ORAL at 06:32

## 2022-07-01 RX ADMIN — SODIUM ZIRCONIUM CYCLOSILICATE 10 G: 10 POWDER, FOR SUSPENSION ORAL at 08:00

## 2022-07-01 RX ADMIN — LIDOCAINE 1 PATCH: 50 PATCH CUTANEOUS at 07:52

## 2022-07-01 RX ADMIN — OXYCODONE 5 MG: 5 TABLET ORAL at 14:27

## 2022-07-01 RX ADMIN — LOSARTAN POTASSIUM 100 MG: 100 TABLET, FILM COATED ORAL at 12:51

## 2022-07-01 RX ADMIN — CARVEDILOL 25 MG: 25 TABLET, FILM COATED ORAL at 18:01

## 2022-07-01 RX ADMIN — ROPINIROLE HYDROCHLORIDE 0.75 MG: 0.5 TABLET, FILM COATED ORAL at 22:15

## 2022-07-01 RX ADMIN — GABAPENTIN 300 MG: 300 CAPSULE ORAL at 08:00

## 2022-07-01 RX ADMIN — CLONIDINE HYDROCHLORIDE 0.2 MG: 0.1 TABLET ORAL at 12:51

## 2022-07-01 RX ADMIN — CARVEDILOL 25 MG: 25 TABLET, FILM COATED ORAL at 07:46

## 2022-07-01 RX ADMIN — ASPIRIN 81 MG: 81 TABLET, COATED ORAL at 08:00

## 2022-07-01 NOTE — PROGRESS NOTES
[facility] Progress Note      Patient Name: Zaina Martinez  : 1965  MRN: [id number]  Primary Care Physician:  Karson Oreilly MD  Date of admission: 2022    Subjective     Interval History:   Follow up ESRD  Patient was seen today on dialysis.  Peritoneal dialysis well with no reported problems.  Afebrile.  Able to UF 4 L today with no issues.        Review of Systems:   As noted above    Objective     Vitals:   Temp:  [97.6 °F (36.4 °C)-98 °F (36.7 °C)] 98 °F (36.7 °C)  Heart Rate:  [57-61] 60  Resp:  [16-18] 16  BP: (142-162)/(59-77) 161/77    Intake/Output Summary (Last 24 hours) at 2022 1323  Last data filed at 2022 1200  Gross per 24 hour   Intake 1195 ml   Output 4000 ml   Net -2805 ml       Physical Exam:   General: ALert NAD chronically ill  HEENT: AT/NC; periorbital edema noted  Neck: RIJ TDC  Lungs: clear to auscultation  Heart: RRR no s3 or rub. 2/6 systolic murmur  Abdomen: +bs, soft, not distended  Extremities: 1+ pitting edema bilateral extremities.  Neuro:  Moves all 4 ext.     Scheduled Meds:     aspirin, 81 mg, Oral, Daily  b complex-vitamin c-folic acid, 1 tablet, Oral, Daily  carvedilol, 25 mg, Oral, BID With Meals  cloNIDine, 0.2 mg, Oral, Q12H  epoetin brooke/brooke-epbx, 10,000 Units, Intravenous, Once per day on   gabapentin, 300 mg, Oral, BID  hydrALAZINE, 100 mg, Oral, Q8H  insulin glargine, 10 Units, Subcutaneous, Q12H  insulin lispro, 0-7 Units, Subcutaneous, TID AC  levothyroxine, 150 mcg, Oral, Q AM  lidocaine, 1 patch, Transdermal, Q24H  losartan, 100 mg, Oral, Q24H  pantoprazole, 40 mg, Oral, Q AM  rOPINIRole, 0.75 mg, Oral, Nightly  sertraline, 150 mg, Oral, Daily  tamsulosin, 0.4 mg, Oral, Daily      IV Meds:        Results Reviewed:   I have personally reviewed the results from the time of this admission to 2022 13:23 EDT     Results from last 7 days   Lab Units 22  0912 22  1139 22  0832   SODIUM mmol/L  134* 136 136   POTASSIUM mmol/L 5.3* 6.6* 5.1   CHLORIDE mmol/L 100 99 100   CO2 mmol/L 23.0 20.0* 24.0   BUN mg/dL 43* 65* 44*   CREATININE mg/dL 2.61* 3.58* 2.63*   CALCIUM mg/dL 7.6* 8.1* 8.0*   BILIRUBIN mg/dL 0.4 0.5 0.4   ALK PHOS U/L 426* 511* 503*   ALT (SGPT) U/L 15 13 10   AST (SGOT) U/L 30 29 26   GLUCOSE mg/dL 91 168* 103*       Estimated Creatinine Clearance: 20.9 mL/min (A) (by C-G formula based on SCr of 2.61 mg/dL (H)).    Results from last 7 days   Lab Units 06/30/22  0912 06/29/22  1139 06/28/22  0832   PHOSPHORUS mg/dL 4.0 4.0 2.6             Results from last 7 days   Lab Units 06/30/22  0912 06/29/22  1139 06/28/22  0832 06/27/22  0952 06/26/22  0819   WBC 10*3/mm3 19.91* 23.61* 21.35* 26.75* 22.65*   HEMOGLOBIN g/dL 8.5* 8.7* 8.8* 8.7* 8.7*   PLATELETS 10*3/mm3 315 340 364 466* 465*             Assessment / Plan     ASSESSMENT:    1.  ESRD, secondary to diabetic and hypertensive glomerulosclerosis; usual HD schedule is MWF.  Her volume status is generous.  2.  Intracerebral bleed and altered mental status  3.  Infected TDC, s/p removal 5/1. Replaced.    Concluded vancomycin with last dose given 5/26  4.  DM2    5.  Coronary artery disease  6.  Acute on chronic diastolic dysfunction  7.  Anemia of CKD, on long-acting ANDRAE as an outpatient. Trying to minimize transfusions unless Hgb less than 7. Iron panel in favor iron deficiency anemia.  8.  Hyponatremia.  Improved on dialysis  9. Hyperkalemia  PLAN:    -Plan to continue dialysis on Monday Wednesday Friday.  -Okay to discharge home from nephrology standpoint to follow-up in outpatient dialysis on Monday Wednesday Friday.          Reynaldo Sotelo MD  07/01/22  13:23 EDT    [facility]  [phone]

## 2022-07-01 NOTE — DISCHARGE SUMMARY
[facility] - REHABILITATION UNIT    ELY MOTTA  1965    ADMIT DATE:  5/13/2022  7:05 PM  DISCHARGE DATE:  07/01/22      CHIEF COMPLAINT:        HISTORY OF PRESENT ILLNESS:    55 yo F with PMHx significant for HTN, HLD, DM, hypothyroid, RA, depression, ESRD on dialysis, CHF, and CAD presented on 4/27 with complaints of confusion subsequently found to be due to R MCA with resultant L hemiplegia. Patients hospital course complicated by hemodialysis catheter related infection with MRSA s/p removal, and labile blood glucose. Transferred to ARU 5/13/22 for Inpatient rehabilitation.    HOSPITAL COURSE:    Patient participated in a comprehensive acute inpatient rehabilitation program.     During the hospital stay the following areas were addressed:      # R MCA s/p TPA with subsequent ICH with debility  Initially held antiplatelet/anticoagulation.  Reviewed with neurology on May 20 and resume aspirin 81 mg daily  SBP goal <160  Recommend outpatient Neurology fu - repeat MRI in 3 months        # DM  June 27-hyperglycemia on steroids.  Lantus increased to 30 units twice daily, Humalog 5 units 3 times daily with meals, and all increase sliding scale coverage  June 28 -hypoglycemic after increasing insulin dosing.  Blood glucose up to 98 prior to lunch, will hold sliding scale insulin and give 5 units scheduled with meal  June 30-hypoglycemia-Lantus twice daily decreased from 35 to 20 units.  Scheduled Humalog with meals discontinued  July 1-prednisone has been discontinued.  Reviewed with internal medicine and Lantus decreased to 10 units twice daily and on discharge home to resume her home regimen of Lantus 10 units daily.  She will check her sugar tonight at home and if elevated give Lantus 10 units tonight and then continue with the Lantus 10 units daily tomorrow.  Reviewed with patient and her  that her sugars may be elevated initially as she just completed prednisone.  They were instructed to  call for any additional instructions on adjustment in regimen if it remains elevated at home this weekend     # ESRD   Nephrology following  Inpatient HD    [facility]     Infectious disease-   # s/p catheter infection with MRSA  Vancomycin IV with stop date of May 26  May 24-vancomycin random equal 12.90-on vancomycin 500 mg IV on [place]  May 26-to complete course of vancomycin today  #Urinary tract infection-on Dana 3 urinalysis was negative for infection but noted to have leukocytosis increase and repeat urinalysis on June 6 shows findings consistent with urinary tract infection.  Placed on a course of cefdinir renal dose 3 mg daily for 7 days.  No costophrenic angle tenderness patient reviewed with infectious disease service.  Leukocytosis felt related to the bladder infection and not previous catheter infection.  June 8-urine culture results pending.  On cefdinir.  Culture with E. coli, sensitivity pending  June 9 - E coli sensitive to cephalosporins.  June 21 - continues with leukocytosis WBC  15K today. May be from inflammatory process. Steroids added yesterday.   June 22-urine described as milky characteristic, different from previous-recheck urinalysis with culture if indicated  June 23-Labs are still pending today.  Urinalysis also pending as she does not make much urine.  She had a temperature of 100.2 last evening, afebrile this morning.  June 27 - abnormal UA but urine culture from June 24 no growth  July 1-leukocytosis felt related to the noninfectious process in the left thigh as well as secondary to steroids     #left hydroureteronephrosis-Dana 3-CT scan shows left hydro ureter nephrosis.  She had some previous dilation of the ureter on comparison the past studies but increased today.  Bladder noted to be markedly distended.  Will do in and out cath now and daily to decompress bladder.  Addendum-intermittent cath for 600 cc.  June 4-once daily intermittent cath 450 cc.  June 5-seen by  urology-Nguyễn catheter placed with plans to recheck hydroureter on renal ultrasound in 3 to 5 days.  June 6-Nguyễn catheter placed yesterday by urology, with only 170 cc out so far.  Patient reports did not note any change in her left groin pain after the in and out cath with decompression of the bladder..  June 8-reviewed with urology service-request noncontrast CT scan stone protocol to evaluate for resolution of the left hydronephrosis with urology planning to see tomorrow to review the films and then make recommendations, urology would recommend leaving the Nguyễn catheter in for the time being.  June 9 - improved left hydroureter on CT , Nguyễn discontinued.   July 1-to follow-up with urology as an outpatient with follow-up CT planned in August.  She occasionally required intermittent straight cath but this was very infrequent.  Home health nursing to follow.  Continues on Flomax     #Anemia-  EPO.  June 6-hemoglobin 7.8  June 21- hemoglobin 8.3  June 27 - hemoglobin 8.7  June 28 - hemoglobin 8.8    Lymphadenopathy-evaluated by hematology oncology while here.  No significant change from scans earlier this year.  She is to repeat a scan in 3 months and follow-up with hematology oncology     #Elevated alkaline phosphatase  June 6-elevated alkaline phosphatase 770, up from 289 one week ago, 140 one month ago. Similar elevation in March, Feb even higher at 1,330 ( intra-abdominal fluid collection, underwent CT-guided aspiration  Revealed blood and cultures did not reveal any growth and therefore antibiotics  discontinued.  Surgery also did evaluate and signed off.), and elevated during admission in January ( She was seen by general surgery who recommended and performed laparoscopic cholecystectomy during that admission).   ALT 70, AST 87, Total bilirubin 0.8. Ca 8.3.  ? If liver source or bone or due to an inflammatory response.  June 7-GGT also elevated.  Seen by gastroenterology.  [place] biliary source.  Liver  ultrasound ordered  June 8-liver ultrasound was unremarkable  June 21 - patient declined liver biopsy.   June 27-gastroenterology following peripherally-plans to follow-up as an outpatient and review option of liver biopsy again if alkaline phosphatase remains elevated  June 28-remains elevated at 503  June 30-alkaline phosphatase lower at 426     #Pain - neuropathy BL lower extremity/left thigh pain  Gabapentin/oxycodone.    June 7-patient with lethargy this morning.  May be due to urinary tract infection but with recent increase and gabapentin and oxycodone, will change gabapentin to 200 mg twice a day and decrease oxycodone from 5 back down to 2.5 mg p.o. every 4 hours as needed  June 8-better speed with her processing today  June 21 - pain med regimen recently adjusted with tramadol 25 mg q 4 hours prn, gabapentin 300 g bid, lidoderm patch, oxycodone 2.5 mg q 6 hours prn. Prednisone 60 mg daily added which may help with pain from inflammatory process. Reports pain better today and participated better in therapies.   June 27 - continue present regimen  July 1-home on oxycodone 5 mg p.o. every 4 hours.  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  Seen by infectious disease service with no acute infectious process noted.  Evaluated by hematology regarding lymphadenopathy, lymph node felt too small to biopsy.  Patient was on a course of low-dose prednisone 10 mg for 3 days then 5 mg for 2 days and then another  10 mg dose with out any significant improvement in the swelling or pain.  She has a history of rheumatoid arthritis.  See CT of the left thigh and MRI of the left thigh and pelvis reports   With lumbar radiculoplexitis and diabetic amyotrophy, on review of the literature do not see edema that she presents with associated with the findings or MRI changes in the tendon and musculature with edema.  There is descriptions of MRI changes in the plexus and peripheral nerve.  In terms of treatment options for diabetic amyotrophy , there have not been definitive clinical trials.  Immunomodulation with steroids has been inconclusive as well as other immunomodulation therapy.  Reviewed with the patient  that  feel that she would benefit from seeing her rheumatologist as an outpatient to evaluate this further, as there does appear to be inflammatory cause given the lymphadenopathy and edema.  Rheumatologist does not come to the hospital.  CPK x2 has been normal.  Aldolase has been normal.  May need to look at biopsy of left thigh muscle to assess further.   June 8-patient reviewed with neurology yesterday who will assess and also provide input regarding whether muscle biopsy may be helpful.  June 9 - Discussed with Neurology and Rheumatology - plan is for EMG and then muscle biopsy.   Dana 15 - Left quadricep muscle biopsy was completed 6/15, results pending.  Labs: CKs have been normal, ANCA panel 6/11 was unremarkable  June 21 - started on full treatment dose of Prednisone 60 mg daily yesterday pending path results. Reports pain better so far today. Specimen sent to [facility]. Clinical impression presently focal inflammatory myositis pending final pathology results.   June 22-pain continues better today.  June 23-outside pathology report still pending  June 24 - Patient reports left thigh pain better over past couple days but still present. Does not have the soreness at medial thigh as before. Complains of pain  at mid-thigh. No change in swelling. Does have discomfort with proximal movement in LLE. Walked 320 feet CTG SBA RW today.   Pain improved on steroid. Discontinued atorvastatin. Biopsy results returned from [facility] - see report -Type 2 fiber atrophy, advanced. Denervation/reinnervation. No evidence of inflammatory myopathy or vasculitis. [facility] lab pursuing a dystrophy panel and will report findings in an addendum.  Reviewed with Neurology - as shown symptomatic response, continue Prednisone 60 mg daily for about one more week, then taper off over month.   June 27 - continue present regimen  June 28-increased pain medication regimen to oxycodone 5 mg every 4 hours as needed for severe pain.   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 Treatment would be aspirin which she is already on. Discussed with Neurology and as been on Prednisone 60 mg for only 1.5 week, will stop and not do taper. Discussed with Internal Medicine who will adjust insulin.  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  Reviewed with the patient that treatment for this is aspirin which she is already on.  She may do walking activities but would avoid strenuous activities with left quadricep strengthening.  She may strengthen the other 3 extremities as tolerated.  Reviewed may take 3 months to resolve.        # HTN   Coreg  Clonidine  Hydralazine  Losartan     #Hypothyroidism  Levothyroxine  June 9 - recheck TSH- addendum Dana 10- On Nov 30, 2021 , on Dec 2, 2021 T4 = 0.90, on Dec 9, T4=1.68. On May 7, TSH = 134. Has been on Levothyroxine 125 mcg/day it appears since at least Dec 13, 2021. See Dr Templeton's discharge note from Dec 17,2021 which discusses thyroid labs/dosing at that time. On June 9, 2022 TSH = 54.4.   Check free T4. Will get evaluation from  Internal Medicine regarding thyroid studies/levothyroxine dosing.  June 11 - levothyroxine increased to 150mcg daily-continue this dose and she will need follow up TSH in 4-6 weeks from June 11 as an outpatient     #CAD  ASA held in setting of ICH - restart aspirin 81 mg daily on May 20, 2022 per Neurology     #Depression   Sertraline  June 28-sertraline dose increased     #GERD   PPI     #Restless leg syndrome  Requip     #DVT prophylaxis-SCDs ordered but patient refusing.  Per neurology note May 11-continue off anticoagulation/antiplatelet for now. Restarted ASA 81 mg on May 20.  May 16-reviewed with patient and try venous foot compression devices  May 20-also not able to tolerate venous foot compression devices.  May 25-bilateral lower extremity venous duplex negative for DVT       -  comprehensive inpatient rehabilitation program working with PT, OT, SLP for a minimum of three hours per day, five days per week. - will monitor for progress     TEAM CONF - MAY 17- BED SBA. TRANSFER MIN. 4 STAIRS MIN. GAIT 160 FEET CTG RW. SLOW TO PROCESS. BATH MIN. LBD MIN. UBD MIN. GROOMING SET UP. TOILETING MIN . COGNITION OVERALL MILD DEFICITS. VISUAL IMPAIRMENT IMPACTS SOME TESTING. ESRD-HD. ANTIBIOTICS - VANCOMYCIN 10.90 YESTERDAY.  ELOS - 1-2 WEEKS.      TEAM CONF - MAY 24 - ALWAYS SO PROCESSING AFTER EACH DIALYSIS SESSION. AT HOME WOULD GO BACK TO HOME AND SLEEP. BED MIN ASSIST. TRANSFERS MIN. GAIT 80   FEET RW CTG. 4 STAIRS CTG. TOILET TRASNFERS AND SHOWER TRANSFERS MIN CTG. BATH CTG. LBD CTG. UBD SET UP. GROOMING SET UP. TOILETING CTG.   LEFT GROIN - ADDUCTOR PAIN - There is mild joint space narrowing involving both hips symmetrically. There are also some minimal degenerative changes involving both hips. The overall appearance is similar to the study of 12/16/2021. MODERATE WORD RETRIEVAL DEFICITS. IMPAIRED VISION AFFECTS HOME MANAGEMENT TASKS. HYPOGLLYCEMIA AFTER SSI .DECREASED TO 0-7 UNITS.   ELOS - ONE  WEEK.         TEAM CONF - MAY 31 - TRANSFERS CTG. GAIT 40- FEET CTG RW LIMITED BY THIGH PAIN. TOILET TRANSFERS CTG. BATH CTG. LBD CTG. UBD SET UP. TOILETING CTG.  GROOMING SET UP. COGNITION - MODERATE WORD RETRIEVAL DEFICITS, MODERATE IMPAIRED MEMORY IMPACTED BY FATIGUE, STILL SLOW PROCESSING.  BNE (Active)  Att'n. - Mildly Imp.  Exec. Fx. - Mildly Imp., slowed processing speed  Rsng/Jgmnt - WNL  Arith - Mod Imp.  Visuospatial Skills - DNA, pt declined citing poor vision  Visual Mem. DNA  Verbal Mem. - WNL  Emot - Pt endorsed mild dysphoria and anxiousness secodnary to current inpatient  Stay.  CONTINENT BOWEL. OCCASIONAL VOID. ESRD-HD. ON INSULIN. SKIN INTACT. LIDODERM PATCH TO LEFT ADDUCTOR TENDON. COURSE OF STEROID FOR LEFT THIGH JEREMI. CONSULTED ORTHO FOR INPUT.  ELOS - POSSIBLY Thursday DEPENDING ON FURTHER EVALUATIONS.            TEAM CONF - JUNE 21 -  DECLINED THERAPY DUE TO PAIN.  AT MOST RECENT THERAPY WHEN PARTICIPATED, TRANSFERS MIN. GAIT 80 FEET MIN / CTG MIN ASSIST RW. TOILET TRANSFERS CTG. TOILETING CTG. MODERATE IMPAIRED VERBAL MEMORY. PAIN MANAGEMENT - MEDS ADJUSTED - OXYCODONE 2.5 MG Q 6 HOURS PRN, TRAMADOL 25 MG Q 4 HOURS. MUSCLE BIOPSY RESULTS PENDING. STARTING ON PREDNISONE 60 MG DAILY YESTERDAY. WILL NEED TO ADJUST INSULING, BLOOD GLUCOSE > 300 THIS AM. GASTROENTEROLOGY EVALUATING LIVER. PATIENT DECLINES LIVER BIOPSY.  HYPOTHYROID - levothyroxine increased to 150mcg daily-continue this dose and she will need follow up TSH in 4-6 weeks from June 11 as an outpatient  ELOS -   1.5 WEEKS    TEAM CONF - June 28 - BED MIN  SIT TO SUPINE, SBA SUPINE TO SIT. CAR TRANSFERS CTG. TRANSFERS MIN ASSIST. 4 STAIRS MIN. GAIT 240 FEET CTG SBA. TOILET TRANSFERS MIN. UBB SBA. LBB MIN WITH LLE. UBD SET UP. LBD MOD ASSIST LLE.   INSULIN ADJUSTED FOR ELEVATED BLOOD GLUCOSE ON STEROIDS. ON PREDNISONE 60 MG DAILY AND PLAN TO TAPER OVER NEXT MONTH . BIOPSY REPORT SENT TO OUTPATIENT RHEUMATOLOGIST YESTERDAY.  ADDITIONAL STUDIES ON BIOPSY PENDING.   BLADDER SCAN 400 CC BUT ONLY 200 CC ON INTERMITTENT STRAIGHT CATH. LEFT QUAD MUSCLE BIOPSY SITE HEALING.  ESRD - HD.   ELOS - Thursday WITH HOME HEALTH PT, OT, AND NURSING FOR DIABETES AND MONITORING ON STEROIDS.     Disposition-July 1-maximize benefit from inpatient rehab stay.  Medically stable.  Steroids discontinued today.  To continue on aspirin for suspected diabetic muscular infarct left thigh.  Internal medicine adjusted insulin as steroids were discontinued.  She will follow-up with rheumatology as an outpatient in a couple of weeks.  To continue with outpatient hemodialysis.  Home health therapy and nursing ordered.  Discharge home today.      Rehabilitation-admit for comprehensive acute inpatient rehabilitation .  This would be an interdisciplinary program with physical therapy 1 hour,  occupational therapy 1 hour, and speech therapy 1 hour, 5 days a week.  Rehabilitation nursing for carryover, monitoring of  DM and HTN and neurologic   status, bowel and bladder, and skin  Ongoing physician follow-up.  Weekly team conferences.    Goal is for home with  outpatient   therapies.  Barrier to discharge:  Impaired mobility and self care  - worked on  Trunk control, strength, transfers , standing activities, ADLS  to overcome.     Physical Exam near the time of discharge:    MENTAL STATUS: Awake alert  HEENT:  NCAT  CARDIOVASCULAR: RRR   CHEST:  hemodialysis access right chest  RESPIRATORY: Normal respirations. CTA  ABDOMEN: Soft, non tender, non distended, +BS,    No tenderness      EXTREMITIES: Left thigh edema persists but is smaller today.  Mild edema in lower leg.  Left thigh with diffuse tenderness to palpation, but less than before  Left quadricep muscle biopsy site is  clean dry intact  No myoclonus.  NEUROLOGIC:  Better processing speed  Pain inhibition proximal LLE  She is able to resistance with left knee extension and has good strength with left ankle  dorsiflexion, ankle eversion, left elbow flexion, left finger flexion.    RESULTS:  Glucose   Date/Time Value Ref Range Status   07/01/2022 1606 287 (H) 70 - 130 mg/dL Final     Comment:     Meter: [id number] : [id number] Edson MCELROY RN   07/01/2022 1246 126 70 - 130 mg/dL Final     Comment:     Meter: [id number] : [id number] Edson MCELROY RN   07/01/2022 0639 95 70 - 130 mg/dL Final     Comment:     Meter: [id number] : [id number] Jennie Gilliam CNA   07/01/2022 0208 186 (H) 70 - 130 mg/dL Final     Comment:     Meter: [id number] : [id number] Jennie Gilliam CNA   06/30/2022 2032 251 (H) 70 - 130 mg/dL Final     Comment:     Meter: [id number] : [id number] Jennie Gilliam CNA   06/30/2022 1615 197 (H) 70 - 130 mg/dL Final     Comment:     Meter: [id number] : [id number] Makprakashadze Firuza NA   06/30/2022 1149 100 70 - 130 mg/dL Final     Comment:     Meter: [id number] : [id number] Makharadze Firuza NA   06/30/2022 1115 68 (L) 70 - 130 mg/dL Final     Comment:     Meter: [id number] : [id number] Makharadze Firuza NA     Results from last 7 days   Lab Units 06/30/22  0912 06/29/22  1139 06/28/22  0832   WBC 10*3/mm3 19.91* 23.61* 21.35*   HEMOGLOBIN g/dL 8.5* 8.7* 8.8*   HEMATOCRIT % 27.2* 28.5* 27.4*   PLATELETS 10*3/mm3 315 340 364     Results from last 7 days   Lab Units 06/30/22  0912 06/29/22  1139 06/28/22  0832   SODIUM mmol/L 134* 136 136   POTASSIUM mmol/L 5.3* 6.6* 5.1   CHLORIDE mmol/L 100 99 100   CO2 mmol/L 23.0 20.0* 24.0   BUN mg/dL 43* 65* 44*   CREATININE mg/dL 2.61* 3.58* 2.63*   CALCIUM mg/dL 7.6* 8.1* 8.0*   BILIRUBIN mg/dL 0.4 0.5 0.4   ALK PHOS U/L 426* 511* 503*   ALT (SGPT) U/L 15 13 10   AST (SGOT) U/L 30 29 26   GLUCOSE mg/dL 91 168* 103*         Latest Reference Range & Units 05/27/22 11:30   Creatine Kinase 20 - 180 U/L 94   Aldolase 3.3 - 10.3 U/L 5.6      EXAM:    CT Head Without Intravenous Contrast-May 9, 2022     CLINICAL HISTORY:     Reason for exam:  headache.     TECHNIQUE:    Axial computed tomography images of the head brain without intravenous   contrast.  CTDI is 54.8 mGy and DLP is 974.4 mGy-cm.  This CT exam was   performed according to the principle of ALARA (As Low As Reasonably   Achievable) by using one or more of the following dose reduction   techniques: automated exposure control, adjustment of the mA and or kV   according to patient size, and or use of iterative reconstruction   technique.     Comparison:  5 1 2022     FINDINGS:    Brain:  Unchanged parenchymal hematoma in the superior left frontal   lobe measuring 1.9 x 1.4 x 2.7 cm.  Mild surrounding edema.  No new   hemorrhage..    Ventricles:  Unremarkable.  No ventriculomegaly.    Bones joints:  Unremarkable.  No acute fracture.    Soft tissues:  Unremarkable.    Sinuses:  Unremarkable as visualized.  No acute sinusitis.    Mastoid air cells:  Unremarkable as visualized.  No mastoid effusion.     IMPRESSION:       Unchanged parenchymal hematoma in the superior left frontal lobe with   mild surrounding edema.  No new hemorrhage.  IMPRESSION:     ====================     CT LEFT FEMUR WITHOUT CONTRAST-May 25, 2022     HISTORY: Left thigh swelling. Swelling from the groin to the knee.      TECHNIQUE: CT includes axial imaging from above the left hip joint to  the knee joint without IV contrast.     COMPARISON: X-rays of the pelvis/left hip 05/23/2022.     FINDINGS: There is generalized edema within the subcutaneous fat about  the pelvis, left hip, left thigh, and left knee. This is nonspecific and  may be associated with cellulitis. CT evaluation for myositis is limited  and there is low density within the left adductor saloni muscle that  could represent myositis though is difficult to differentiate from  artifact. There is no evidence for abscess or drainable collection.  Diffuse atherosclerotic calcifications are present. Mild arthritic  changes are present at the left hip and knee. No  fracture or acute  osseous abnormality is evident. There is left inguinal radha  enlargement. A left inguinal node measures 1.5 cm short axis. An  additional left inguinal, proximal anteromedial thigh node measures 2.7  x 1.2 cm. Left inguinal nodes are increased in size when compared to the  previous CT 02/23/2022.     IMPRESSION:  1. Generalized edema within the subcutaneous fat about the pelvis, left  hip, left thigh, extending to the left knee. This is nonspecific and may  be associated with cellulitis. There is no evidence for abscess or  drainable collection. There is low density of the left hip adductor  musculature which could be artifactual though could also represent  myositis. No abnormal soft tissue gas. No left hip or left femoral  fracture.  2. Left inguinal and proximal anteromedial left thigh radha enlargement  has developed when compared to prior exam 02/23/2022 and may be  associated with lymphadenitis/reactive radha enlargement.  3. Diffuse atherosclerotic disease.     Radiation dose reduction techniques were utilized, including automated  exposure control and exposure modulation based on body size.     This report was finalized on 5/26/2022 8:43 AM     =========================     MRI PELVIS AND MRI LEFT FEMUR WITHOUT CONTRAST-Dana 3, 2022     HISTORY: Left hip and thigh pain with tenderness at the left hip  adductor musculature. Extensive chronic medical history. No known  injury.     TECHNIQUE: MRI of the pelvis was performed using axial and coronal T1  and STIR sequences and MRI of the proximal two thirds of the left thigh  was performed using axial and coronal T1 and STIR sequences which show  both thighs and a sagittal proton density sequence which shows the left  thigh. These are correlated with consultation notes as well as abdomen  and pelvis CT 06/02/2022 and left hip CT 05/25/2022.     PELVIS MRI FINDINGS: There is diffuse third spacing as well as some  patient motion on every  sequence. Marrow signal throughout the pelvis  and the visualized proximal femurs is normal. Small volume of peritoneal  fluid. A normal volume of fluid is present at each hip. Cartilage at the  hips appears normal. The symphysis pubis and the SI joints appear  normal. The lower lumbar spinal canal is obscured. The hip abductor  musculature and tendons appear normal. Muscles around the pelvis appear  normal.     MRI LEFT FEMUR FINDINGS: There is similar diffuse third spacing and  motion artifact. However, there is asymmetric, prominent soft tissue  edema in the left thigh with muscular and intramuscular edema along the  medial thigh and short adductor musculature of the left hip. There is no  well-demarcated, focal fluid collection. Additionally, there is no focal  lesion demonstrating signal features suggestive of hematoma. The fibers  of the adductor brevis and adductor longus muscles appear thickened,  edematous, and disorganized along their distal half, tapering more than  those at the opposite leg distally. The left adductor saloni is  edematous but does not appear disrupted or disorganized. That muscle is  symmetrical to the opposite hip. The left hamstring mechanism is intact.  There is some edema of the left quadriceps musculature distally but no  muscle deformity.     IMPRESSION:  Diffuse third spacing throughout the pelvis and in the  thighs with asymmetrically prominent soft tissue edema in the left  thigh. Muscular deformity in the left thigh appears most pronounced at  the left hip adductor longus and brevis muscles. The somewhat  disorganized appearance to those muscles distally as well as their  asymmetry suggests a mechanical injury with muscle strain/tear. Myositis  is the other primary differential consideration. There is no focal  hematoma or abscess collection. There is no joint effusion or osseous  deformity.     This report was finalized on 6/3/2022 5:20 PM   =====================     MRI OF  THE ABDOMEN WITH MRCP IMAGING WITHOUT CONTRAST-June 18, 2022     CLINICAL HISTORY: Abnormal liver function tests     COMPARISON: CT scan of the abdomen and pelvis without contrast dated  06/20/2022.     The images are degraded due to patient breathing motion artifact. The  liver is normal in size and configuration. There is no definite evidence  of cirrhosis. The liver shows normal signal intensity. Detection of  liver masses is suboptimal without contrast. However no definite solid  or cystic masses are identified within the liver. There is no bile duct  dilatation. There is no evidence of choledocholithiasis. The gallbladder  is absent. The pancreatic duct was not demonstrated, but is certainly  not dilated. The pancreas is difficult to delineate and appears  atrophic. The spleen and visualized kidneys are unremarkable. A small  left pleural effusion is noted.     IMPRESSIONS: Suboptimal study due to patient breathing motion artifact.  Atrophic pancreas. Otherwise unremarkable MRI of the abdomen. No  discrete liver masses are identified on these noncontrast enhanced  images..     This report was finalized on 6/18/2022 4:48 PM     =============================     Exam: CT of the abdomen and pelvis without IV contrast.  June 8, 2022           HISTORY: 56-year-old evaluate for left-sided hydronephrosis     TECHNIQUE: Radiation dose reduction techniques were utilized, including  automated exposure control and exposure modulation based on body size.   3 mm images were obtained through the abdomen and pelvis without IV  contrast. Lack of contrast limits evaluation of solid, visceral, and  vascular structures. Sensitivity for underlying infection and lesions  decreased..      COMPARISON: Ultrasound 06/07/2022, CT 06/02/2022     FINDINGS:  Lower chest: Small bilateral pleural effusions with bibasilar airspace  disease left greater than right similar to the prior. Cardiomegaly.  Diffuse low-attenuation of the  intravascular blood pool just anemia.  Right central venous catheter with the tip terminating in the right  atrium. Small pericardial effusion..     Liver: Morphologic changes suggestive of chronic liver disease with  heterogeneous enhancement which limits evaluation for underlying liver  lesions. Recommend correlation with history/laboratory values and  continued surveillance.     Cholecystectomy.     Spleen: Within normal limits.     Pancreas: Not well visualized. Atrophic..     Adrenals: Bilateral thickening left greater than right.     Kidneys:  Mild left-sided hydroureteronephrosis with dilatation  particularly involving the distal ureter somewhat improved from the  prior exam. No definitive radiopaque filling defect however evaluation  is limited on this noncontrast exam. Diffuse thickening of the urinary  bladder which is decompressed by Nguyễn catheter with perivesical  stranding. Right-sided hydronephrosis. Extensive intrarenal vascular  calcifications.     Bowel:  Circumferential thickening of the distal esophagus with mildly  prominent subjacent nodes similar to the prior. The stomach is  moderately distended. No obstruction. Moderate colonic stool burden.  Ensure up-to-date with colonoscopy..     Peritoneum: Mesenteric edema with small volume ascites.     Vasculature:    Extensive atherosclerosis thoracoabdominal aorta and  branch vessels similar to the prior. Vascular evaluation is limited  without IV contrast..     Lymph Nodes:  Bulky abdominal and pelvic adenopathy particularly in the  retroperitoneum with an index 1.5 cm left para-aortic node, 1.4 cm  aortocaval node, and 1.2 cm left common iliac node. Pelvic adenopathy  extends along the iliac chain to the groin similar in appearance to the  prior. An index left inguinal node is 1.8 cm..                                                             Pelvic organs: Diffuse thickening of the urinary bladder which is  decompressed by Nguyễn catheter.  Prostate in situ. Extensive vascular  calcifications. Presacral edema..     Abdominal/Pelvic Wall: Diffuse anasarca. Asymmetric edema/thickening  involving the musculature of the medial thigh/abductor compartments with  poor visualization of the fat planes. Vascular evaluation is limited  without contrast.     Multilevel degenerative changes thoracolumbar spine and pelvis.  Fragmentation along the superior endplate of T12 with sclerosis similar  to the most recent exam but increased from prior imaging. No definitive  retropulsion. Evaluation of the canal is limited on this noncontrast  exam.     IMPRESSION:  1. Small bilateral pleural effusions with bibasilar airspace disease  left greater than right.  2. Morphologic changes of chronic liver disease with small volume  ascites, mesenteric edema, and anasarca. Recommend continued follow-up  with contrast imaging for better evaluation.  3. Abdominal and pelvic adenopathy similar to the prior recommend  continued close surveillance or PET/CT as metastatic disease could have  a similar appearance.  4. Improvement in mild left-sided hydroureteronephrosis with dilatation  particularly involving the distal ureter. Although no definitive  radiopaque filling defects are appreciated urothelial lesion or  stricture not excluded. Recommend urology consultation and subsequent  follow-up with CT urogram for better evaluation of the  tract.  5. Diffuse thickening of the urinary bladder can be seen with incomplete  distention or cystitis.  6. Asymmetric edema particularly involving the medial compartment of the  left thigh with diffuse edema/masslike thickening of the abductor/medial  compartment better visualized on the prior MRI. Nonspecific findings can  be seen with myositis, trauma, or infection. Please refer to the prior  MRI and follow-up short-term after treatment.  7. Increased sclerosis and fragmentation of the T12 vertebral bodies is  similar to the most recent exam but  increased from February 2022 as  previously suggested recommend MRI for further evaluation as findings  can be seen with infection or pathologic fracture.  8. Please see above for additional findings/recommendations.     This report was finalized on 6/8/2022 10:01 PM       Discharge Medications      New Medications      Instructions Start Date   b complex-vitamin c-folic acid 0.8 MG tablet tablet   1 tablet, Oral, Daily   Start Date: July 2, 2022     carvedilol 25 MG tablet  Commonly known as: COREG   25 mg, Oral, 2 Times Daily With Meals      cloNIDine 0.2 MG tablet  Commonly known as: CATAPRES   0.2 mg, Oral, Every 12 Hours Scheduled      epoetin brooke-epbx 47765 UNIT/ML injection  Commonly known as: RETACRIT   10,000 Units, Subcutaneous, 3 Times Weekly      gabapentin 300 MG capsule  Commonly known as: NEURONTIN   300 mg, Oral, 2 Times Daily      Insulin Glargine 100 UNIT/ML injection pen  Commonly known as: LANTUS SOLOSTAR  Replaces: insulin glargine 100 UNIT/ML injection   10 Units, Subcutaneous, Daily      Insulin Pen Needle 32G X 4 MM misc   Use to inject insulin daily      lidocaine 5 %  Commonly known as: LIDODERM   1 patch, Transdermal, Every 24 Hours Scheduled, 4% patch - over the counter - Remove & Discard patch within 12 hours or as directed by MD      losartan 100 MG tablet  Commonly known as: COZAAR   100 mg, Oral, Every 24 Hours Scheduled      oxyCODONE 5 MG immediate release tablet  Commonly known as: ROXICODONE   Take 1 tablet by mouth Every 4 (Four) Hours As Needed for Severe Pain.      zolpidem 5 MG tablet  Commonly known as: AMBIEN   Take 1 tablet by mouth At Night As Needed for Sleep.         Changes to Medications      Instructions Start Date   acetaminophen 500 MG tablet  Commonly known as: TYLENOL  What changed:   · medication strength  · how much to take  · when to take this   500 mg, Oral, Every 6 Hours PRN      hydrALAZINE 100 MG tablet  Commonly known as: APRESOLINE  What changed: when to  take this   100 mg, Oral, Every 8 Hours Scheduled      levothyroxine 150 MCG tablet  Commonly known as: SYNTHROID, LEVOTHROID  What changed:   · medication strength  · how much to take  · when to take this   150 mcg, Oral, Every Early Morning   Start Date: July 2, 2022     sertraline 50 MG tablet  Commonly known as: ZOLOFT  What changed:   · medication strength  · how much to take   150 mg, Oral, Daily   Start Date: July 2, 2022        Continue These Medications      Instructions Start Date   Adult Aspirin Regimen 81 MG EC tablet  Generic drug: aspirin   81 mg, Oral, Daily      folic acid 1 MG tablet  Commonly known as: FOLVITE   1 mg, Oral, Daily      pantoprazole 40 MG EC tablet  Commonly known as: PROTONIX   40 mg, Oral, Daily      rOPINIRole 0.25 MG tablet  Commonly known as: REQUIP   0.75 mg, Oral, Every Night at Bedtime      tamsulosin 0.4 MG capsule 24 hr capsule  Commonly known as: FLOMAX   1 capsule, Oral, Daily      vitamin D3 125 MCG (5000 UT) capsule capsule   2,000 Units, Oral, Daily         Stop These Medications    amLODIPine 5 MG tablet  Commonly known as: NORVASC     furosemide 40 MG tablet  Commonly known as: LASIX     HYDROcodone-acetaminophen 5-325 MG per tablet  Commonly known as: NORCO     insulin glargine 100 UNIT/ML injection  Commonly known as: LANTUS, SEMGLEE  Replaced by: Insulin Glargine 100 UNIT/ML injection pen     insulin lispro 100 UNIT/ML injection  Commonly known as: humaLOG     metoprolol tartrate 25 MG tablet  Commonly known as: LOPRESSOR     multivitamin with minerals tablet tablet     OLANZapine 5 MG tablet  Commonly known as: zKarson Sullivan MD Winders, Mark T., [facility] [phone] [employer]  [address] [address]Kujz Steps: Follow up on 7/19/2022Appointment: Instructions: At 2:00 P. M.  Please arrive at 1:45 P.M.    ===    John Tovar MD Thornton, James Benjamin,  [phone] [address] [address]Mxeq Steps: Follow up on 8/31/2022Appointment: Instructions: Follow up august 31, 2022 @ 9:00 am    ===    Brittanie Cortez MD June, Lisa Ann, [phone] [facility] [address]Rart Steps: Follow up on 7/15/2022Appointment: Instructions: At 4:00 P.M.    ===    Marques Ma MD Reiss, Steven J, [phone] [address] [address]Vhxv Steps: Appointment: Instructions: 7/1 Per Marjan, she will send message to the doctor & will call back.    ===    Satnam Fierro MD Prendergast, Neal J, [phone] [facility] [address]Gdmy Steps: Follow up on 9/12/2022Appointment: Instructions: On Sept. 2, at 8:30 A.M. for CT Scan  and  On Sep. 16, at 8:30 A.M. for follow up appointment    ===    German Ordoñez MD Smith, Greg N, [phone] [address] [address]Kszg Steps: Follow up in 1 month(s)Appointment: Instructions: Follow-up for the myopathy which was diagnosed by him as inpatient with EMG.  -----------   7/1  - Please tell to the Pt. to call at the office at this # [phone] & ask for Cecilia.    ===    Adonis Florentino MD Gormley, John Michael, [facility] and [phone] [address] [address]Nyye Steps: Follow up on 8/8/2022Appointment: Instructions: At 9:30 A.M.    ===    Reynaldo Sotelo MD El-Haddad, Boutros, [phone] [facility] CenterJOHN[address] [address]Fjwy Steps: Follow upAppointment: Instructions: 7/01  The doctor will see  the Pt. in the dialyses.    ===    Levothyroxine increased to 150mcg daily-continue this dose and she will need follow up TSH in 4-6 weeks from June 11 as an outpatient levothyroxine increased to 150mcg daily-continue this dose and  she will need follow up TSH in 4-6 weeks from June 11 as an outpatientNext Steps: Follow upAppointment: Instructions:    ===     Marv Gallagher MD Dobozi, Brian M, [phone] [facility] [address] [address]Mpto Steps: Follow up on 7/18/2022Appointment: Instructions: At 10:45 A.M. with RIKA [place].    ===  Stroke/ ESRD -HD/ DM/ diabetic muscle infarction left thigh - [facility] for PT,OT and skilled nursing.      >30 on discharge    Adonis Florentino MD

## 2022-07-01 NOTE — PROGRESS NOTES
On review with Rheumatology and Neurology, suspicion is for diabetic muscle infarction. Treatment would be aspirin which she is already on. Discussed with Neurology and as been on Prednisone 60 mg for only 1.5 week, will stop and not do taper. Discussed with Internal Medicine who will adjust insulin.    Adonis Florentino MD

## 2022-07-01 NOTE — PLAN OF CARE
Goal Outcome Evaluation:  Plan of Care Reviewed With: patient        Progress: improving  Outcome Evaluation: Pt ready for DC. Pt going home today.

## 2022-07-01 NOTE — NURSING NOTE
HD WITHOUT INCIDENT OR C/O. TOLERATED WELL. REMOVED 4 L AS DESEAN. RETACRIT AS ORDERED. DRSG CHANGED, CDI WITH BIOPATCH.  STABLE, NO C/O UPON COMPLETION OF TREATMENT.

## 2022-07-01 NOTE — PROGRESS NOTES
"DAILY PROGRESS NOTE  [facility]    Patient Identification:  Name: Zaina Martinez  Age: 56 y.o.  Sex: female  :  1965  MRN: [id number]         Primary Care Physician: Karson Oreilly MD      Subjective  Patient no acute complaints.    Objective:  General Appearance:  Comfortable, well-appearing, in no acute distress and not in pain.    Vital signs: (most recent): Blood pressure 161/77, pulse 60, temperature 98 °F (36.7 °C), temperature source Temporal, resp. rate 16, height 157.5 cm (62.01\"), weight 62.5 kg (137 lb 12.6 oz), SpO2 97 %, not currently breastfeeding.    Lungs:  Normal effort and normal respiratory rate.  Breath sounds clear to auscultation.    Heart: Normal rate.  Regular rhythm.    Extremities: There is no dependent edema.    Neurological: Patient is alert and oriented to person, place and time.    Skin:  Warm and dry.                Vital signs in last 24 hours:  Temp:  [97.6 °F (36.4 °C)-98 °F (36.7 °C)] 98 °F (36.7 °C)  Heart Rate:  [57-61] 60  Resp:  [16-18] 16  BP: (142-162)/(59-77) 161/77    Intake/Output:    Intake/Output Summary (Last 24 hours) at 2022 1320  Last data filed at 2022 1200  Gross per 24 hour   Intake 1195 ml   Output 4000 ml   Net -2805 ml         Results from last 7 days   Lab Units 22  0912 22  1139 22  0832 22  0952 22  0819 22  0923   WBC 10*3/mm3 19.91* 23.61* 21.35* 26.75* 22.65* 21.72*   HEMOGLOBIN g/dL 8.5* 8.7* 8.8* 8.7* 8.7* 8.0*   PLATELETS 10*3/mm3 315 340 364 466* 465* 451*     Results from last 7 days   Lab Units 22  0912 22  1139 22  0832 22  0952 22  0819 22  0923   SODIUM mmol/L 134* 136 136 131* 129* 129*   POTASSIUM mmol/L 5.3* 6.6* 5.1 5.8* 4.6 4.1   CHLORIDE mmol/L 100 99 100 96* 95* 95*   CO2 mmol/L 23.0 20.0* 24.0 17.5* 22.0 22.0   BUN mg/dL 43* 65* 44* 76* 58* 36*   CREATININE mg/dL 2.61* 3.58* 2.63* 4.29* 3.42* 2.78*   GLUCOSE mg/dL 91 168* 103* 308* " 391* 425*   Estimated Creatinine Clearance: 20.9 mL/min (A) (by C-G formula based on SCr of 2.61 mg/dL (H)).  Results from last 7 days   Lab Units 06/30/22  0912 06/29/22  1139 06/28/22  0832 06/27/22  0952 06/26/22  0819 06/25/22  0923   CALCIUM mg/dL 7.6* 8.1* 8.0* 8.6 7.9* 7.6*   ALBUMIN g/dL 2.60* 3.30* 3.00* 3.00* 3.10* 2.80*   PHOSPHORUS mg/dL 4.0 4.0 2.6 2.8 3.1 2.4*     Results from last 7 days   Lab Units 06/30/22  0912 06/29/22  1139 06/28/22  0832 06/27/22  0952 06/26/22  0819 06/25/22  0923   ALBUMIN g/dL 2.60* 3.30* 3.00* 3.00* 3.10* 2.80*   BILIRUBIN mg/dL 0.4 0.5 0.4 0.5 0.5 0.5   ALK PHOS U/L 426* 511* 503* 718* 622* 640*   AST (SGOT) U/L 30 29 26 28 25 26   ALT (SGPT) U/L 15 13 10 11 9 8       Assessment:    Hypothyroidism    Type 2 diabetes mellitus with kidney complication, with long-term current use of insulin (HCC)    ESRD (end stage renal disease) (HCC)    Chronic diastolic CHF (congestive heart failure) (HCC)    Hyperlipidemia    Stroke (HCC)    Abnormal urinalysis    Diabetic muscle infarction (HCC)    Other insomnia    This is a 56-year-old female with a history end-stage renal disease who presented to the hospital after missing some dialysis and unfortunately  had a rather complicated hospitalization with bacteremia and infection in her tunneled dialysis catheter as well as acute stroke during the hospitalization requiring tPA. She was discharged and admitted to [facility] re[facility] on 5/14.      Type 2 diabetes mellitus  Blood glucose control improved.  Discussed with Dr. Florentino.  Prednisone is being discontinued.  We will start tapering back to insulin and on discharge she can resume her preadmission regime.      Plan:  Please see above    Kameron Luis MD  7/1/2022  13:20 EDT

## 2022-07-01 NOTE — SIGNIFICANT NOTE
07/01/22 1545   OTHER   Discipline physical therapy assistant  (Family teaching was scheduled with  at 2:30 today. When 3:30 came and  still not here, the , Jerrica called him. He declined family teaching.)   Rehab Time/Intention   Session Not Performed patient/family declined treatment

## 2022-07-01 NOTE — PROGRESS NOTES
"[facility].  [facility]      [facility] for PT,OT,ST and skilled nursing. Pt has recommended medical equipment, rolling walker and a shower seat.     Pt's  was scheduled for family teaching today at 14:30 but did not come at the scheduled time. SW called Mr Martinez @ 15;40. Mr Martinez states he cannot get to the hospital \"any time soon\" and  he does not feel he needs any instruction from physical therapy.   Told Mr Martinez the rehab therapist would not be here after 16:30 and [facility] could assist with questions or concerns at home.   "

## 2022-07-01 NOTE — TELEPHONE ENCOUNTER
Rehab at [facility]  called to set up appointment for Dr. Ma to follow up with.  Please call patient to set up follow up appointment.

## 2022-07-01 NOTE — PROGRESS NOTES
CC: Debility    SUBJECTIVE:    Plan was for discharge home today and preparations were made for discharge.  However, when the patient went up to the car which was an SUV she could not get up into the car.  In addition, the patient and her  concerns for doing stairs at home and being able to get back out of the home.  (On June 28 team conference she was noted to be able to do a car transfer with contact-guard assist.  Did 4 stairs min assist.  Gait 240 feet contact-guard standby assist.)  Patient returned up to the rehab unit.  Her other car is a lower sport car that she had difficulty getting out of before hospitalization.  She had hemodialysis today with 4 L removed.  Current plan is to continue with hospital stay over the weekend and reassess her functional status and disposition options the first of the week.         OBJECTIVE:  Vitals:    07/01/22 1846   BP: (!) 188/82   Pulse: 62   Resp:    Temp:    SpO2:        GENERAL: NAD  MENTAL STATUS: Awake alert  HEENT:  NCAT  CARDIOVASCULAR: RRR   CHEST:  hemodialysis access right chest  RESPIRATORY: Normal respirations. CTA  ABDOMEN: Soft, non tender, non distended, +BS,    No tenderness     EXTREMITIES: Left thigh edema persists.  Overall the amount of edema appears similar..  Mild edema in lower leg.  Left thigh is nontender to palpation  Left quadricep muscle biopsy site is  clean dry intact  No myoclonus.  NEUROLOGIC:  Better processing speed  Pain inhibition proximal LLE  She is able to resistance with left knee extension and has good strength with left ankle dorsiflexion, ankle eversion, left elbow flexion, left finger flexion.      Scheduled Meds:aspirin, 81 mg, Oral, Daily  b complex-vitamin c-folic acid, 1 tablet, Oral, Daily  carvedilol, 25 mg, Oral, BID With Meals  cloNIDine, 0.2 mg, Oral, Q12H  epoetin brooke/brooke-epbx, 10,000 Units, Intravenous, Once per day on Mon Wed Fri  gabapentin, 300 mg, Oral, BID  hydrALAZINE, 100 mg, Oral, Q8H  insulin  glargine, 10 Units, Subcutaneous, Q12H  [START ON 7/2/2022] insulin glargine, 10 Units, Subcutaneous, QAM  insulin lispro, 0-7 Units, Subcutaneous, TID AC  levothyroxine, 150 mcg, Oral, Q AM  lidocaine, 1 patch, Transdermal, Q24H  losartan, 100 mg, Oral, Q24H  pantoprazole, 40 mg, Oral, Q AM  rOPINIRole, 0.75 mg, Oral, Nightly  sertraline, 150 mg, Oral, Daily  tamsulosin, 0.4 mg, Oral, Daily      Continuous Infusions:   PRN Meds:.•  acetaminophen  •  dextrose  •  dextrose  •  glucagon (human recombinant)  •  heparin (porcine)  •  hydrALAZINE  •  nitroglycerin  •  oxyCODONE  •  oxyCODONE  •  sodium chloride  •  zolpidem    Glucose   Date/Time Value Ref Range Status   07/01/2022 1606 287 (H) 70 - 130 mg/dL Final     Comment:     Meter: [id number] : [id number] Edson MCELROY RN   07/01/2022 1246 126 70 - 130 mg/dL Final     Comment:     Meter: [id number] : [id number] Earnestdavid Lopez L RN   07/01/2022 0639 95 70 - 130 mg/dL Final     Comment:     Meter: [id number] : [id number] Jennie Gilliam [facility]   07/01/2022 0208 186 (H) 70 - 130 mg/dL Final     Comment:     Meter: [id number] : [id number] Jennie Gilliam [facility]   06/30/2022 2032 251 (H) 70 - 130 mg/dL Final     Comment:     Meter: [id number] : [id number] Jennie Gilliam [facility]   06/30/2022 1615 197 (H) 70 - 130 mg/dL Final     Comment:     Meter: [id number] : [id number] Jamin Ruggiero NA   06/30/2022 1149 100 70 - 130 mg/dL Final     Comment:     Meter: [id number] : [id number] Jamin Chuuza NA   06/30/2022 1115 68 (L) 70 - 130 mg/dL Final     Comment:     Meter: [id number] : [id number] Jamin Alexandraa NA     Results from last 7 days   Lab Units 07/01/22  1827 06/30/22  0912 06/29/22  1139   WBC 10*3/mm3 24.21* 19.91* 23.61*   HEMOGLOBIN g/dL 9.4* 8.5* 8.7*   HEMATOCRIT % 30.0* 27.2* 28.5*   PLATELETS 10*3/mm3 288 315 340     Results from last 7 days   Lab Units 07/01/22 1827 06/30/22  0912 06/29/22  1139   SODIUM mmol/L 134* 134* 136    POTASSIUM mmol/L 5.4* 5.3* 6.6*   CHLORIDE mmol/L 97* 100 99   CO2 mmol/L 24.0 23.0 20.0*   BUN mg/dL 34* 43* 65*   CREATININE mg/dL 2.21* 2.61* 3.58*   CALCIUM mg/dL 7.9* 7.6* 8.1*   BILIRUBIN mg/dL 0.5 0.4 0.5   ALK PHOS U/L 450* 426* 511*   ALT (SGPT) U/L 18 15 13   AST (SGOT) U/L 33* 30 29   GLUCOSE mg/dL 75 91 168*      Latest Reference Range & Units 05/27/22 11:30   Creatine Kinase 20 - 180 U/L 94   Aldolase 3.3 - 10.3 U/L 5.6       Imaging Results (Last 24 Hours)     ** No results found for the last 24 hours. **          ASSESSMENT AND PLAN    # R MCA s/p TPA with subsequent ICH with debility  Initially held antiplatelet/anticoagulation.  Reviewed with neurology on May 20 and resume aspirin 81 mg daily  SBP goal <160  Recommend outpatient Neurology fu - repeat MRI in 3 months        # DM  June 27-hyperglycemia on steroids.  Lantus increased to 30 units twice daily, Humalog 5 units 3 times daily with meals, and all increase sliding scale coverage  June 28 -hypoglycemic after increasing insulin dosing.  Blood glucose up to 98 prior to lunch, will hold sliding scale insulin and give 5 units scheduled with meal  June 30-hypoglycemia-Lantus twice daily decreased from 35 to 20 units.  Scheduled Humalog with meals discontinued  July 1-prednisone has been discontinued.  Reviewed with internal medicine and Lantus decreased to 10 units twice daily and on discharge home to resume her home regimen of Lantus 10 units daily.  She will check her sugar tonight at home and if elevated give Lantus 10 units tonight and then continue with the Lantus 10 units daily tomorrow.  Reviewed with patient and her  that her sugars may be elevated initially as she just completed prednisone.  They were instructed to call for any additional instructions on adjustment in regimen if it remains elevated at home this weekend     # ESRD   Nephrology following  Inpatient HD    Flomax     Infectious disease-   # s/p catheter infection with  MRSA  Vancomycin IV with stop date of May 26  May 24-vancomycin random equal 12.90-on vancomycin 500 mg IV on Hfofwow-Fvwayygw-Dzshnndi  May 26-to complete course of vancomycin today  #Urinary tract infection-on Dana 3 urinalysis was negative for infection but noted to have leukocytosis increase and repeat urinalysis on June 6 shows findings consistent with urinary tract infection.  Placed on a course of cefdinir renal dose 3 mg daily for 7 days.  No costophrenic angle tenderness patient reviewed with infectious disease service.  Leukocytosis felt related to the bladder infection and not previous catheter infection.  June 8-urine culture results pending.  On cefdinir.  Culture with E. coli, sensitivity pending  June 9 - E coli sensitive to cephalosporins.  June 21 - continues with leukocytosis WBC  15K today. May be from inflammatory process. Steroids added yesterday.   June 22-urine described as milky characteristic, different from previous-recheck urinalysis with culture if indicated  June 23-Labs are still pending today.  Urinalysis also pending as she does not make much urine.  She had a temperature of 100.2 last evening, afebrile this morning.  June 27 - abnormal UA but urine culture from June 24 no growth  July 1-leukocytosis felt related to the noninfectious process in the left thigh as well as secondary to steroids     #left hydroureteronephrosis-Dana 3-CT scan shows left hydro ureter nephrosis.  She had some previous dilation of the ureter on comparison the past studies but increased today.  Bladder noted to be markedly distended.  Will do in and out cath now and daily to decompress bladder.  Addendum-intermittent cath for 600 cc.  June 4-once daily intermittent cath 450 cc.  June 5-seen by urology-Nguyễn catheter placed with plans to recheck hydroureter on renal ultrasound in 3 to 5 days.  June 6-Nguyễn catheter placed yesterday by urology, with only 170 cc out so far.  Patient reports did not note any  change in her left groin pain after the in and out cath with decompression of the bladder..  June 8-reviewed with urology service-request noncontrast CT scan stone protocol to evaluate for resolution of the left hydronephrosis with urology planning to see tomorrow to review the films and then make recommendations, urology would recommend leaving the Nguyễn catheter in for the time being.  June 9 - improved left hydroureter on CT , Nguyễn discontinued.   July 1-to follow-up with urology as an outpatient with follow-up CT planned in August.  She occasionally required intermittent straight cath but this was very infrequent.  Home health nursing to follow.  Continues on Flomax     #Anemia-  EPO.  June 6-hemoglobin 7.8  June 21- hemoglobin 8.3  June 27 - hemoglobin 8.7  June 28 - hemoglobin 8.8     Lymphadenopathy-evaluated by hematology oncology while here.  No significant change from scans earlier this year.  She is to repeat a scan in 3 months and follow-up with hematology oncology     #Elevated alkaline phosphatase  June 6-elevated alkaline phosphatase 770, up from 289 one week ago, 140 one month ago. Similar elevation in March, Feb even higher at 1,330 ( intra-abdominal fluid collection, underwent CT-guided aspiration  Revealed blood and cultures did not reveal any growth and therefore antibiotics  discontinued.  Surgery also did evaluate and signed off.), and elevated during admission in January ( She was seen by general surgery who recommended and performed laparoscopic cholecystectomy during that admission).   ALT 70, AST 87, Total bilirubin 0.8. Ca 8.3.  ? If liver source or bone or due to an inflammatory response.  June 7-GGT also elevated.  Seen by gastroenterology.  [place] biliary source.  Liver ultrasound ordered  June 8-liver ultrasound was unremarkable  June 21 - patient declined liver biopsy.   June 27-gastroenterology following peripherally-plans to follow-up as an outpatient and review option of liver  biopsy again if alkaline phosphatase remains elevated  June 28-remains elevated at 503  June 30-alkaline phosphatase lower at 426     #Pain - neuropathy BL lower extremity/left thigh pain  Gabapentin/oxycodone.    June 7-patient with lethargy this morning.  May be due to urinary tract infection but with recent increase and gabapentin and oxycodone, will change gabapentin to 200 mg twice a day and decrease oxycodone from 5 back down to 2.5 mg p.o. every 4 hours as needed  June 8-better speed with her processing today  June 21 - pain med regimen recently adjusted with tramadol 25 mg q 4 hours prn, gabapentin 300 g bid, lidoderm patch, oxycodone 2.5 mg q 6 hours prn. Prednisone 60 mg daily added which may help with pain from inflammatory process. Reports pain better today and participated better in therapies.   June 27 - continue present regimen  July 1-home on oxycodone 5 mg p.o. every 4 hours.  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  Seen by infectious disease service with no acute infectious process noted.  Evaluated by hematology regarding lymphadenopathy, lymph node felt too small to biopsy.  Patient was on a course of low-dose prednisone 10 mg for 3 days then 5 mg for 2 days and then another 10 mg dose with out any significant improvement in the swelling or pain.  She has a history of rheumatoid arthritis.  See CT of the left thigh and MRI of the left thigh and pelvis reports   With lumbar  radiculoplexitis and diabetic amyotrophy, on review of the literature do not see edema that she presents with associated with the findings or MRI changes in the tendon and musculature with edema.  There is descriptions of MRI changes in the plexus and peripheral nerve.  In terms of treatment options for diabetic amyotrophy , there have not been definitive clinical trials.  Immunomodulation with steroids has been inconclusive as well as other immunomodulation therapy.  Reviewed with the patient  that  feel that she would benefit from seeing her rheumatologist as an outpatient to evaluate this further, as there does appear to be inflammatory cause given the lymphadenopathy and edema.  Rheumatologist does not come to the hospital.  CPK x2 has been normal.  Aldolase has been normal.  May need to look at biopsy of left thigh muscle to assess further.   June 8-patient reviewed with neurology yesterday who will assess and also provide input regarding whether muscle biopsy may be helpful.  June 9 - Discussed with Neurology and Rheumatology - plan is for EMG and then muscle biopsy.   Dana 15 - Left quadricep muscle biopsy was completed 6/15, results pending.  Labs: CKs have been normal, ANCA panel 6/11 was unremarkable  June 21 - started on full treatment dose of Prednisone 60 mg daily yesterday pending path results. Reports pain better so far today. Specimen sent to [facility]. Clinical impression presently focal inflammatory myositis pending final pathology results.   June 22-pain continues better today.  June 23-outside pathology report still pending  June 24 - Patient reports left thigh pain better over past couple days but still present. Does not have the soreness at medial thigh as before. Complains of pain at mid-thigh. No change in swelling. Does have discomfort with proximal movement in LLE. Walked 320 feet CTG SBA RW today.   Pain improved on steroid. Discontinued atorvastatin. Biopsy results returned  from [facility] - see report -Type 2 fiber atrophy, advanced. Denervation/reinnervation. No evidence of inflammatory myopathy or vasculitis. [facility] lab pursuing a dystrophy panel and will report findings in an addendum.  Reviewed with Neurology - as shown symptomatic response, continue Prednisone 60 mg daily for about one more week, then taper off over month.   June 27 - continue present regimen  June 28-increased pain medication regimen to oxycodone 5 mg every 4 hours as needed for severe pain.   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 Treatment would be aspirin which she is already on. Discussed with Neurology and as been on Prednisone 60 mg for only 1.5 week, will stop and not do taper. Discussed with Internal Medicine who will adjust insulin.  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  Reviewed with the patient that treatment for this is aspirin which she is already on.  She may do walking activities but would avoid strenuous activities with left quadricep strengthening.  She may strengthen the other 3 extremities as tolerated.  Reviewed may take 3 months to resolve.        # HTN   Coreg  Clonidine  Hydralazine  Losartan     #Hypothyroidism  Levothyroxine  June 9 - recheck TSH- addendum Dana 10- On Nov 30, 2021 , on Dec 2, 2021 T4 = 0.90, on Dec 9, T4=1.68. On May 7, TSH = 134. Has been on Levothyroxine 125 mcg/day it appears since at least Dec 13, 2021. See Dr Templetno's discharge note from Dec 17,2021 which discusses thyroid labs/dosing at that time. On June 9, 2022 TSH = 54.4.   Check free T4. Will get evaluation from Internal Medicine regarding thyroid studies/levothyroxine dosing.  June 11 - levothyroxine increased to 150mcg daily-continue this dose and she will need follow up TSH in 4-6 weeks from June 11 as an  outpatient     #CAD  ASA held in setting of ICH - restart aspirin 81 mg daily on May 20, 2022 per Neurology     #Depression   Sertraline  June 28-sertraline dose increased     #GERD   PPI     #Restless leg syndrome  Requip     #DVT prophylaxis-SCDs ordered but patient refusing.  Per neurology note May 11-continue off anticoagulation/antiplatelet for now. Restarted ASA 81 mg on May 20.  May 16-reviewed with patient and try venous foot compression devices  May 20-also not able to tolerate venous foot compression devices.  May 25-bilateral lower extremity venous duplex negative for DVT        -  comprehensive inpatient rehabilitation program working with PT, OT, SLP for a minimum of three hours per day, five days per week. - will monitor for progress     TEAM CONF - MAY 17- BED SBA. TRANSFER MIN. 4 STAIRS MIN. GAIT 160 FEET CTG RW. SLOW TO PROCESS. BATH MIN. LBD MIN. UBD MIN. GROOMING SET UP. TOILETING MIN . COGNITION OVERALL MILD DEFICITS. VISUAL IMPAIRMENT IMPACTS SOME TESTING. ESRD-HD. ANTIBIOTICS - VANCOMYCIN 10.90 YESTERDAY.  ELOS - 1-2 WEEKS.      TEAM CONF - MAY 24 - ALWAYS SO PROCESSING AFTER EACH DIALYSIS SESSION. AT HOME WOULD GO BACK TO HOME AND SLEEP. BED MIN ASSIST. TRANSFERS MIN. GAIT 80   FEET RW CTG. 4 STAIRS CTG. TOILET TRASNFERS AND SHOWER TRANSFERS MIN CTG. BATH CTG. LBD CTG. UBD SET UP. GROOMING SET UP. TOILETING CTG.   LEFT GROIN - ADDUCTOR PAIN - There is mild joint space narrowing involving both hips symmetrically. There are also some minimal degenerative changes involving both hips. The overall appearance is similar to the study of 12/16/2021. MODERATE WORD RETRIEVAL DEFICITS. IMPAIRED VISION AFFECTS HOME MANAGEMENT TASKS. HYPOGLLYCEMIA AFTER SSI .DECREASED TO 0-7 UNITS.   ELOS - ONE WEEK.         TEAM CONF - MAY 31 - TRANSFERS CTG. GAIT 40- FEET CTG RW LIMITED BY THIGH PAIN. TOILET TRANSFERS CTG. BATH CTG. LBD CTG. UBD SET UP. TOILETING CTG.  GROOMING SET UP. COGNITION - MODERATE  WORD RETRIEVAL DEFICITS, MODERATE IMPAIRED MEMORY IMPACTED BY FATIGUE, STILL SLOW PROCESSING.  BNE (Active)  Att'n. - Mildly Imp.  Exec. Fx. - Mildly Imp., slowed processing speed  Rsng/Jgmnt - WNL  Arith - Mod Imp.  Visuospatial Skills - DNA, pt declined citing poor vision  Visual Mem. DNA  Verbal Mem. - WNL  Emot - Pt endorsed mild dysphoria and anxiousness secodnary to current inpatient  Stay.  CONTINENT BOWEL. OCCASIONAL VOID. ESRD-HD. ON INSULIN. SKIN INTACT. LIDODERM PATCH TO LEFT ADDUCTOR TENDON. COURSE OF STEROID FOR LEFT THIGH JEREMI. CONSULTED ORTHO FOR INPUT.  ELOS - POSSIBLY Thursday DEPENDING ON FURTHER EVALUATIONS.            TEAM CONF - JUNE 21 -  DECLINED THERAPY DUE TO PAIN.  AT MOST RECENT THERAPY WHEN PARTICIPATED, TRANSFERS MIN. GAIT 80 FEET MIN / CTG MIN ASSIST RW. TOILET TRANSFERS CTG. TOILETING CTG. MODERATE IMPAIRED VERBAL MEMORY. PAIN MANAGEMENT - MEDS ADJUSTED - OXYCODONE 2.5 MG Q 6 HOURS PRN, TRAMADOL 25 MG Q 4 HOURS. MUSCLE BIOPSY RESULTS PENDING. STARTING ON PREDNISONE 60 MG DAILY YESTERDAY. WILL NEED TO ADJUST INSULING, BLOOD GLUCOSE > 300 THIS AM. GASTROENTEROLOGY EVALUATING LIVER. PATIENT DECLINES LIVER BIOPSY.  HYPOTHYROID - levothyroxine increased to 150mcg daily-continue this dose and she will need follow up TSH in 4-6 weeks from June 11 as an outpatient  ELOS -   1.5 WEEKS     TEAM CONF - June 28 - BED MIN  SIT TO SUPINE, SBA SUPINE TO SIT. CAR TRANSFERS CTG. TRANSFERS MIN ASSIST. 4 STAIRS MIN. GAIT 240 FEET CTG SBA. TOILET TRANSFERS MIN. UBB SBA. LBB MIN WITH LLE. UBD SET UP. LBD MOD ASSIST LLE.   INSULIN ADJUSTED FOR ELEVATED BLOOD GLUCOSE ON STEROIDS. ON PREDNISONE 60 MG DAILY AND PLAN TO TAPER OVER NEXT MONTH . BIOPSY REPORT SENT TO OUTPATIENT RHEUMATOLOGIST YESTERDAY. ADDITIONAL STUDIES ON BIOPSY PENDING.   BLADDER SCAN 400 CC BUT ONLY 200 CC ON INTERMITTENT STRAIGHT CATH. LEFT QUAD MUSCLE BIOPSY SITE HEALING.  ESRD - HD.   ELOS - Thursday WITH HOME HEALTH PT, OT, AND NURSING  FOR DIABETES AND MONITORING ON STEROIDS.             Adonis Florentino MD       During rounds, used appropriate personal protective equipment including mask and gloves.  Additional gown if indicated.  Mask used was standard procedure mask. Appropriate PPE was worn during the entire visit.  Hand hygiene was completed before and after.

## 2022-07-01 NOTE — PLAN OF CARE
Goal Outcome Evaluation:     Slept fairly well. Ambien given for sleep, as ordered. Meds with water. On fluid restrictions; Dialysis on M-W-F. Oxycodone given for Lt. Leg pain, with some relief. Refused Aby nannette, & Brianna lacey. Had BM earlier today. Glucose 251@ 20:32 & 186 @ 02:08. Discharge plans for Friday 7-1 , after Dialysis.

## 2022-07-02 LAB
ALBUMIN SERPL-MCNC: 2.8 G/DL (ref 3.5–5.2)
ALP SERPL-CCNC: 380 U/L (ref 39–117)
ALT SERPL W P-5'-P-CCNC: 14 U/L (ref 1–33)
ANION GAP SERPL CALCULATED.3IONS-SCNC: 13 MMOL/L (ref 5–15)
AST SERPL-CCNC: 28 U/L (ref 1–32)
BASOPHILS # BLD AUTO: 0.02 10*3/MM3 (ref 0–0.2)
BASOPHILS NFR BLD AUTO: 0.1 % (ref 0–1.5)
BILIRUB CONJ SERPL-MCNC: <0.2 MG/DL (ref 0–0.3)
BILIRUB INDIRECT SERPL-MCNC: ABNORMAL MG/DL
BILIRUB SERPL-MCNC: 0.4 MG/DL (ref 0–1.2)
BUN SERPL-MCNC: 40 MG/DL (ref 6–20)
BUN/CREAT SERPL: 16 (ref 7–25)
CALCIUM SPEC-SCNC: 7.6 MG/DL (ref 8.6–10.5)
CHLORIDE SERPL-SCNC: 98 MMOL/L (ref 98–107)
CO2 SERPL-SCNC: 24 MMOL/L (ref 22–29)
CREAT SERPL-MCNC: 2.5 MG/DL (ref 0.57–1)
DEPRECATED RDW RBC AUTO: 56.6 FL (ref 37–54)
EGFRCR SERPLBLD CKD-EPI 2021: 22.1 ML/MIN/1.73
EOSINOPHIL # BLD AUTO: 0.02 10*3/MM3 (ref 0–0.4)
EOSINOPHIL NFR BLD AUTO: 0.1 % (ref 0.3–6.2)
ERYTHROCYTE [DISTWIDTH] IN BLOOD BY AUTOMATED COUNT: 18.9 % (ref 12.3–15.4)
GLUCOSE BLDC GLUCOMTR-MCNC: 102 MG/DL (ref 70–130)
GLUCOSE BLDC GLUCOMTR-MCNC: 103 MG/DL (ref 70–130)
GLUCOSE BLDC GLUCOMTR-MCNC: 120 MG/DL (ref 70–130)
GLUCOSE BLDC GLUCOMTR-MCNC: 140 MG/DL (ref 70–130)
GLUCOSE BLDC GLUCOMTR-MCNC: 41 MG/DL (ref 70–130)
GLUCOSE BLDC GLUCOMTR-MCNC: 92 MG/DL (ref 70–130)
GLUCOSE BLDC GLUCOMTR-MCNC: 97 MG/DL (ref 70–130)
GLUCOSE SERPL-MCNC: 65 MG/DL (ref 65–99)
HCT VFR BLD AUTO: 26.8 % (ref 34–46.6)
HGB BLD-MCNC: 8.4 G/DL (ref 12–15.9)
IMM GRANULOCYTES # BLD AUTO: 0.22 10*3/MM3 (ref 0–0.05)
IMM GRANULOCYTES NFR BLD AUTO: 1 % (ref 0–0.5)
LYMPHOCYTES # BLD AUTO: 2.27 10*3/MM3 (ref 0.7–3.1)
LYMPHOCYTES NFR BLD AUTO: 10.6 % (ref 19.6–45.3)
MCH RBC QN AUTO: 26.2 PG (ref 26.6–33)
MCHC RBC AUTO-ENTMCNC: 31.3 G/DL (ref 31.5–35.7)
MCV RBC AUTO: 83.5 FL (ref 79–97)
MONOCYTES # BLD AUTO: 1.42 10*3/MM3 (ref 0.1–0.9)
MONOCYTES NFR BLD AUTO: 6.7 % (ref 5–12)
NEUTROPHILS NFR BLD AUTO: 17.4 10*3/MM3 (ref 1.7–7)
NEUTROPHILS NFR BLD AUTO: 81.5 % (ref 42.7–76)
NRBC BLD AUTO-RTO: 0 /100 WBC (ref 0–0.2)
PHOSPHATE SERPL-MCNC: 4.6 MG/DL (ref 2.5–4.5)
PLATELET # BLD AUTO: 234 10*3/MM3 (ref 140–450)
PMV BLD AUTO: 10 FL (ref 6–12)
POTASSIUM SERPL-SCNC: 5.3 MMOL/L (ref 3.5–5.2)
PROT SERPL-MCNC: 5.5 G/DL (ref 6–8.5)
RBC # BLD AUTO: 3.21 10*6/MM3 (ref 3.77–5.28)
SODIUM SERPL-SCNC: 135 MMOL/L (ref 136–145)
WBC NRBC COR # BLD: 21.35 10*3/MM3 (ref 3.4–10.8)

## 2022-07-02 PROCEDURE — 80076 HEPATIC FUNCTION PANEL: CPT | Performed by: PHYSICAL MEDICINE & REHABILITATION

## 2022-07-02 PROCEDURE — 97116 GAIT TRAINING THERAPY: CPT

## 2022-07-02 PROCEDURE — 80048 BASIC METABOLIC PNL TOTAL CA: CPT | Performed by: INTERNAL MEDICINE

## 2022-07-02 PROCEDURE — 84100 ASSAY OF PHOSPHORUS: CPT | Performed by: INTERNAL MEDICINE

## 2022-07-02 PROCEDURE — 82962 GLUCOSE BLOOD TEST: CPT

## 2022-07-02 PROCEDURE — 85025 COMPLETE CBC W/AUTO DIFF WBC: CPT | Performed by: PHYSICAL MEDICINE & REHABILITATION

## 2022-07-02 RX ADMIN — OXYCODONE 5 MG: 5 TABLET ORAL at 06:29

## 2022-07-02 RX ADMIN — GABAPENTIN 300 MG: 300 CAPSULE ORAL at 21:12

## 2022-07-02 RX ADMIN — LEVOTHYROXINE SODIUM 150 MCG: 0.15 TABLET ORAL at 06:29

## 2022-07-02 RX ADMIN — ROPINIROLE HYDROCHLORIDE 0.75 MG: 0.5 TABLET, FILM COATED ORAL at 21:11

## 2022-07-02 RX ADMIN — HYDRALAZINE HYDROCHLORIDE 100 MG: 50 TABLET, FILM COATED ORAL at 13:39

## 2022-07-02 RX ADMIN — PANTOPRAZOLE SODIUM 40 MG: 40 TABLET, DELAYED RELEASE ORAL at 06:29

## 2022-07-02 RX ADMIN — ACETAMINOPHEN 500 MG: 500 TABLET, FILM COATED ORAL at 14:04

## 2022-07-02 RX ADMIN — CLONIDINE HYDROCHLORIDE 0.2 MG: 0.1 TABLET ORAL at 09:08

## 2022-07-02 RX ADMIN — HYDRALAZINE HYDROCHLORIDE 100 MG: 50 TABLET, FILM COATED ORAL at 22:49

## 2022-07-02 RX ADMIN — HYDRALAZINE HYDROCHLORIDE 100 MG: 50 TABLET, FILM COATED ORAL at 06:29

## 2022-07-02 RX ADMIN — OXYCODONE 5 MG: 5 TABLET ORAL at 14:29

## 2022-07-02 RX ADMIN — CLONIDINE HYDROCHLORIDE 0.2 MG: 0.1 TABLET ORAL at 21:11

## 2022-07-02 RX ADMIN — ZOLPIDEM TARTRATE 5 MG: 5 TABLET ORAL at 21:12

## 2022-07-02 RX ADMIN — CARVEDILOL 25 MG: 25 TABLET, FILM COATED ORAL at 17:20

## 2022-07-02 RX ADMIN — LIDOCAINE 1 PATCH: 50 PATCH CUTANEOUS at 09:09

## 2022-07-02 RX ADMIN — CARVEDILOL 25 MG: 25 TABLET, FILM COATED ORAL at 09:08

## 2022-07-02 RX ADMIN — TAMSULOSIN HYDROCHLORIDE 0.4 MG: 0.4 CAPSULE ORAL at 09:09

## 2022-07-02 RX ADMIN — OXYCODONE 5 MG: 5 TABLET ORAL at 17:55

## 2022-07-02 RX ADMIN — GABAPENTIN 300 MG: 300 CAPSULE ORAL at 09:09

## 2022-07-02 RX ADMIN — Medication 1 TABLET: at 09:09

## 2022-07-02 RX ADMIN — ASPIRIN 81 MG: 81 TABLET, COATED ORAL at 09:08

## 2022-07-02 RX ADMIN — SERTRALINE 150 MG: 100 TABLET, FILM COATED ORAL at 09:09

## 2022-07-02 RX ADMIN — OXYCODONE 5 MG: 5 TABLET ORAL at 22:49

## 2022-07-02 RX ADMIN — LOSARTAN POTASSIUM 100 MG: 100 TABLET, FILM COATED ORAL at 09:09

## 2022-07-02 RX ADMIN — INSULIN GLARGINE-YFGN 10 UNITS: 100 INJECTION, SOLUTION SUBCUTANEOUS at 09:09

## 2022-07-02 RX ADMIN — OXYCODONE 5 MG: 5 TABLET ORAL at 11:24

## 2022-07-02 NOTE — PLAN OF CARE
Goal Outcome Evaluation:           Progress: Patient improving. Outcome Evaluation: A&OX4. Memory has improved. Pt participated in therapies. Given PRN pain meds q 4 hours today. Ate better at meals. Full code. Meds whole with water. Bladder scan q 8 hours and cath if more than 300ml. Continent and incontinent of B&B. Wears a brief. Last bowel movement 7/1. Has been more alert and interactive since the steroid. BS was lower, lantus in AM decreased. Labs tomorrow: BMP AM draw. WBC still elevated. L. Leg edematous from thigh to knee. Ice applied. Daily fluid restriction of 1500 ml. Dialysis dressing change due 7/8.

## 2022-07-02 NOTE — PROGRESS NOTES
[facility] Progress Note      Patient Name: Zaina Martinez  : 1965  MRN: [id number]  Primary Care Physician:  Karson Oreilly MD  Date of admission: 2022    Subjective     Interval History:   Follow up ESRD    Seen and examined.  No shortness of air or chest pain.  Review of Systems:   As noted above    Objective     Vitals:   Temp:  [97.3 °F (36.3 °C)-98.6 °F (37 °C)] 97.7 °F (36.5 °C)  Heart Rate:  [59-66] 59  Resp:  [16-18] 18  BP: (151-202)/(73-89) 160/75    Intake/Output Summary (Last 24 hours) at 2022 1139  Last data filed at 2022 0908  Gross per 24 hour   Intake 530 ml   Output 4000 ml   Net -3470 ml       Physical Exam:   General: ALert NAD chronically ill  HEENT: AT/NC; periorbital edema noted  Neck: RIJ TDC  Lungs: clear to auscultation  Heart: RRR no s3 or rub. 2/6 systolic murmur  Abdomen: +bs, soft, not distended  Extremities: 1+ pitting edema bilateral extremities.  Neuro:  Moves all 4 ext.     Scheduled Meds:     aspirin, 81 mg, Oral, Daily  b complex-vitamin c-folic acid, 1 tablet, Oral, Daily  carvedilol, 25 mg, Oral, BID With Meals  cloNIDine, 0.2 mg, Oral, Q12H  epoetin brooke/brooke-epbx, 10,000 Units, Intravenous, Once per day on   gabapentin, 300 mg, Oral, BID  hydrALAZINE, 100 mg, Oral, Q8H  insulin glargine, 10 Units, Subcutaneous, QAM  insulin lispro, 0-7 Units, Subcutaneous, TID AC  levothyroxine, 150 mcg, Oral, Q AM  lidocaine, 1 patch, Transdermal, Q24H  losartan, 100 mg, Oral, Q24H  pantoprazole, 40 mg, Oral, Q AM  rOPINIRole, 0.75 mg, Oral, Nightly  sertraline, 150 mg, Oral, Daily  tamsulosin, 0.4 mg, Oral, Daily      IV Meds:        Results Reviewed:   I have personally reviewed the results from the time of this admission to 2022 11:39 EDT     Results from last 7 days   Lab Units 22  0828 22  1827 22  0912   SODIUM mmol/L 135* 134* 134*   POTASSIUM mmol/L 5.3* 5.4* 5.3*   CHLORIDE mmol/L 98 97* 100   CO2  mmol/L 24.0 24.0 23.0   BUN mg/dL 40* 34* 43*   CREATININE mg/dL 2.50* 2.21* 2.61*   CALCIUM mg/dL 7.6* 7.9* 7.6*   BILIRUBIN mg/dL 0.4 0.5 0.4   ALK PHOS U/L 380* 450* 426*   ALT (SGPT) U/L 14 18 15   AST (SGOT) U/L 28 33* 30   GLUCOSE mg/dL 65 75 91       Estimated Creatinine Clearance: 23.6 mL/min (A) (by C-G formula based on SCr of 2.5 mg/dL (H)).    Results from last 7 days   Lab Units 07/02/22  0828 07/01/22 1827 06/30/22  0912   PHOSPHORUS mg/dL 4.6* 3.2 4.0             Results from last 7 days   Lab Units 07/02/22  0828 07/01/22 1827 06/30/22  0912 06/29/22  1139 06/28/22  0832   WBC 10*3/mm3 21.35* 24.21* 19.91* 23.61* 21.35*   HEMOGLOBIN g/dL 8.4* 9.4* 8.5* 8.7* 8.8*   PLATELETS 10*3/mm3 234 288 315 340 364             Assessment / Plan     ASSESSMENT:    1.  ESRD, secondary to diabetic and hypertensive glomerulosclerosis; usual HD schedule is MWF.  Her volume status is generous.  2.  Intracerebral bleed and altered mental status  3.  Infected TDC, s/p removal 5/1. Replaced.    Concluded vancomycin with last dose given 5/26  4.  DM2    5.  Coronary artery disease  6.  Acute on chronic diastolic dysfunction  7.  Anemia of CKD, on long-acting ANDRAE as an outpatient. Trying to minimize transfusions unless Hgb less than 7. Iron panel in favor iron deficiency anemia.  8.  Hyponatremia.  Improved on dialysis  9. Hyperkalemia  PLAN:    -Plan to continue dialysis on Monday Wednesday Friday.  Patient remains swollen and we plan to challenge her weight as tolerated  -Surveillance labs.          Ruthann Palmer MD  07/02/22  11:39 EDT    [facility]  [phone]

## 2022-07-02 NOTE — PROGRESS NOTES
Phone message left for pt's  at 10:00 hoping to schedule family teaching for today at 2:30. Mr Martinez has not responded to my message so left a second phone message that if Mr Martinez is not able to come today,  family teaching can be done on Monday, 7/4 at 11:30.

## 2022-07-02 NOTE — PROGRESS NOTES
Case Management  Inpatient Rehabilitation Plan of Care and Discharge Plan Note    Rehabilitation Diagnosis:  Branch  Date of Onset:  Branch    Medical Summary:  Branch  Past Medical History: Branch    Plan of Care  Updated Problems/Interventions  Field    Expected Intensity:  Branch  Interdisciplinary Team:  Branch  Estimated Length of Stay/Anticipated Discharge Date: Branch  Anticipated Discharge Destination:  Anticipated discharge destination from inpatient rehabilitation is community  discharge with assistance. Discharge on 7/1 cancelled when pt was unable to  transfer into the family SUV.  did not attend scheduled family teaching  on 7/1.      Based on the patient's medical and functional status, their prognosis and  expected level of functional improvement is:  Branch    Signed by: STACY Ramírez

## 2022-07-02 NOTE — PLAN OF CARE
Goal Outcome Evaluation:         Pt A/Ox4, calm/cooperative, OOB x 1. Meds taken whole w thin liquids. Pt c/o pain to L upper medial thigh/ groin ; prn Oxycodone given. Prn Ambien given for sleep. HTN improved with medication. Pt continent overnight using toilet; pt states that she voided large amount . Bladder scan volume low this morning; no intervention required.

## 2022-07-02 NOTE — THERAPY TREATMENT NOTE
"Chief Complaint   Patient presents with     RECHECK     sleep disorder falls asleep easily, so cannot drive,  using bipap,        Initial /66 (BP Location: Right arm, Patient Position: Chair, Cuff Size: Adult Regular)  Pulse 67  Resp 12  Ht 1.74 m (5' 8.5\")  Wt 66.2 kg (146 lb)  SpO2 99%  BMI 21.88 kg/m2 Estimated body mass index is 21.88 kg/(m^2) as calculated from the following:    Height as of this encounter: 1.74 m (5' 8.5\").    Weight as of this encounter: 66.2 kg (146 lb).  Medication Reconciliation: complete     Sue Worthington CNA, Clinical Coordinator  " Inpatient Rehabilitation - Physical Therapy Treatment Note       [facility]     Patient Name: Zaina Martinez  : 1965  MRN: [id number]    Today's Date: 2022                    Admit Date: 2022      Visit Dx:     ICD-10-CM ICD-9-CM   1. Generalized weakness  R53.1 780.79   2. 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 Other insomnia  G47.09 780.52       Patient Active Problem List   Diagnosis   • Renal insufficiency   • Hypertensive disorder   • Hypothyroidism   • Type 2 diabetes mellitus with kidney complication, with long-term current use of insulin ([facility])   • Rheumatoid arthritis ([facility])   • Angioedema   • Esophageal dysmotility   • Anemia   • Medically noncompliant   • Myocardial infarction due to demand ischemia ([facility])   • Enteritis   • PRES (posterior reversible encephalopathy syndrome)   • Urine retention   • Klebsiella infection   • Superficial thrombophlebitis   • Generalized weakness   • ESRD (end stage renal disease) ([facility])   • CAD (coronary artery disease)   • Abnormal urinalysis   • Chronic diastolic CHF (congestive heart failure) ([facility])   • Pyelonephritis   • Calculus of gallbladder with acute on chronic cholecystitis without obstruction   • Pleural effusion on right   • Anemia due to chronic kidney disease, on chronic dialysis ([facility])   • Abnormal findings on diagnostic imaging of other specified body structures   • Acute upper respiratory infection   • Agitation   • Alkaline phosphatase raised   • Casts present in urine   • Cellulitis of toe   • Hip pain   • Community acquired pneumonia   • Depressive disorder   • Diarrhea of presumed infectious origin   • Difficult or painful urination   • Disease due to severe acute respiratory syndrome coronavirus 2 (SARS-CoV-2)   • Dyspnea   • Encounter for follow-up examination after completed treatment for conditions other than malignant neoplasm   • H/O: hypothyroidism   • Hyperlipidemia   • Hypomagnesemia   • Intractable  vomiting with nausea   • Leukocytosis   • Luetscher's syndrome   • Need for influenza vaccination   • Restless legs   • Noncompliance with treatment   • Shoulder pain   • Urinary tract infectious disease   • Metabolic encephalopathy   • Abnormal findings on diagnostic imaging of abdomen   • Status post cholecystectomy   • Hyponatremia   • Leukocytosis   • Acute metabolic encephalopathy   • Encephalopathy, toxic   • Acute CVA (cerebrovascular accident) ([facility])   • Intracranial hemorrhage (HCC)   • Stroke (HCC)   • Abnormal urinalysis   • Diabetic muscle infarction (HCC)   • Other insomnia       Past Medical History:   Diagnosis Date   • Acute CVA (cerebrovascular accident) (HCC) 5/1/2022   • Acute on chronic diastolic CHF (congestive heart failure) (HCC)    • CAD (coronary artery disease) 12/20/2021   • Diabetes (HCC)    • Disease of thyroid gland    • GERD (gastroesophageal reflux disease)    • Hyperlipidemia 11/30/2018   • Hypertension    • Rheumatoid arthritis (HCC)        Past Surgical History:   Procedure Laterality Date   • CHOLECYSTECTOMY WITH INTRAOPERATIVE CHOLANGIOGRAM N/A 1/10/2022    Procedure: Laparoscopic cholecystectomy with intraoperative cholangiogram;  Surgeon: Ramana Raygoza MD;  Location: [facility];  Service: General;  Laterality: N/A;   • EYE SURGERY     • HYSTERECTOMY     • INSERTION HEMODIALYSIS CATHETER N/A 12/6/2021    Procedure: HEMODIALYSIS CATHETER INSERTION;  Surgeon: Keli Salazar MD;  Location: [facility] 18/19;  Service: Vascular;  Laterality: N/A;   • INSERTION HEMODIALYSIS CATHETER N/A 5/3/2022    Procedure: TUNNELED CATHETER PLACEMENT;  Surgeon: Keli Salazar MD;  Location: [facility];  Service: Vascular;  Laterality: N/A;   • MUSCLE BIOPSY Left 6/15/2022    Procedure: Left quadriceps muscle biopsy;  Surgeon: Marques Ma MD;  Location: [facility];  Service: Neurosurgery;  Laterality: Left;       PT ASSESSMENT (last 12 hours)     IRF PT  Evaluation and Treatment     Row Name 07/02/22 1415          PT Time and Intention    Document Type daily treatment  -     Mode of Treatment physical therapy  -     Patient/Family/Caregiver Comments/Observations Anticipated pt d/c yesterday afternoon.  Per chart , pt spouse did not show for family teaching and stairs and spouse reorted to  that felt confident taking care of spouse at home.  At d/c with nursing , pt ws unable to get up into Large SUV( [employer]).  Notes per RAHEEM BRAMBILA also report that pt and spouse have concerns with stairs.  Intially pt refused therapy today if it involved OOB rx.  RN premedicated pt so she conceded. Pt seen today to review stairs and attempt to problem solve high height of car.  Pt noted that they have access to no other lower cars and she can not get into that car for dialysis and that she felt like she needed too much help to safely do stairs at home.  -     Row Name 07/02/22 1415          General Information    Patient Profile Reviewed yes  -     General Observations of Patient Pt supine in bed with HOB elevated with ice on L thigh.  Needed max encouragement to participate.  -     Existing Precautions/Restrictions fall;other (see comments)  -     Comment, General Information Pt cooncerned that she needs too much assist to be d/c home.  Reinforced to pt tht all noted from this week have her at a CG level for transfers and gait and a Min A for stairs( her performance today confirms this).  Reinforced that if stairs and car transfers were primary concerns, then that is what we need to focus on in therapies.  Pt agreed.  -     Row Name 07/02/22 1415          Pain Assessment    Pretreatment Pain Rating 8/10  -     Posttreatment Pain Rating 8/10  -     Pain Location - Side/Orientation Left  -     Pain Location lower  -     Pain Location - groin  -     Row Name 07/02/22 1415          Cognition/Psychosocial    Affect/Mental Status (Cognition) flat/blunted affect   -     Orientation Status (Cognition) oriented x 3  -     Follows Commands (Cognition) follows one-step commands  -     Personal Safety Interventions fall prevention program maintained;gait belt;nonskid shoes/slippers when out of bed;supervised activity  -     Comment, Cognition Pt denied the fact that she has been getting 3 hours of therapy a day.  -     Row Name 07/02/22 1415          Bed Mobility    Rolling Left [place] (Bed Mobility) modified independence  -     Rolling Right [place] (Bed Mobility) modified independence  -     Supine-Sit [place] (Bed Mobility) standby assist;supervision  -     Sit-Supine [place] (Bed Mobility) standby assist;supervision  -     Bed Mobility, Safety Issues decreased use of legs for bridging/pushing  -     Assistive Device (Bed Mobility) bed rails  -     Row Name 07/02/22 1415          Transfers    Bed-Chair [place] (Transfers) contact guard  -     Chair-Bed [place] (Transfers) minimum assist (75% patient effort);contact guard;verbal cues  -     Assistive Device (Bed-Chair Transfers) wheelchair;walker, front-wheeled  -     Sit-Stand [place] (Transfers) contact guard;minimum assist (75% patient effort)  inc time  -     Stand-Sit [place] (Transfers) contact guard  -[facility] Name 07/02/22 1415          Chair-Bed Transfer    Assistive Device (Chair-Bed Transfers) walker, front-wheeled  -     Comment, (Chair-Bed Transfer) more fatigued following PT  -[facility] Name 07/02/22 1415          Sit-Stand Transfer    Assistive Device (Sit-Stand Transfers) walker, front-wheeled;wheelchair  -[facility] Name 07/02/22 1415          Stand-Sit Transfer    Assistive Device (Stand-Sit Transfers) walker, front-wheeled;wheelchair  -[facility] Name 07/02/22 1415          Car Transfer    [place] Level (Car Transfer) contact guard;2 person assist  -     Assistive Device (Car Transfer) walker, front-wheeled  -     Comment,  (Car Transfer) Performed mock car transfer to elevated high lo mat at highest level with backward step up to 6 inch step and then sitting back onto mat to demonstrate modification that could be used to get into higher SUV.  Pt performed with CG of 2 and VC with SBA of 1 for pt confidence.  -KP     Row Name 07/02/22 1415          Gait/Stairs (Locomotion)    Gait Assessment/Intervention deferred - focus on barriers to d/c.  Pt agreeable to minimal sessoin.  -KP     [place] Level (Stairs) minimum assist (75% patient effort);verbal cues  had SBA of 2 for pt confidence - neither was needed  -KP     Handrail Location (Stairs) both sides  -KP     Number of Steps (Stairs) 4  -KP     Ascending Technique (Stairs) step-to-step  -KP     Descending Technique (Stairs) step-to-step  -KP     Row Name 07/02/22 1415          Safety Issues, Functional Mobility    Safety Issues Affecting Function (Mobility) insight into deficits/self-awareness;judgment  -KP     Impairments Affecting Function (Mobility) balance;endurance/activity tolerance;pain  -KP     Cognitive Impairments, Mobility Safety/Performance insight into deficits/self-awareness;problem-solving/reasoning;judgment  -KP     Comment, Safety Issues/Impairments (Mobility) Pt  -KP     Row Name 07/02/22 1415          Positioning and Restraints    Pre-Treatment Position in bed  -KP     In Bed supine;notified nsg;call light within reach;encouraged to call for assist;exit alarm on  -KP           User Key  (r) = Recorded By, (t) = Taken By, (c) = Cosigned By    Initials Name Provider Type     Alyx Whiting, PT Physical Therapist              Wound 06/15/22 1312 Left anterior thigh Incision (Active)   Dressing Appearance open to air 07/02/22 0908   Closure Liquid skin adhesive 07/02/22 0908   Base clean;dry 07/02/22 0908   Periwound dry;intact 07/02/22 0908   Drainage Amount none 07/02/22 0908   Dressing Care open to air 07/02/22 0908     Physical Therapy Education                  Title: PT OT SLP Therapies (In Progress)     Topic: Physical Therapy (In Progress)     Point: Mobility training (In Progress)     Learning Progress Summary           Patient Nonacceptance, E,TB,D, NR by  at 7/2/2022 1535    Comment: Modifactions for getting into higher car with step and review of stairs and current functional level.   Significant Other Refuses, E, RT by LB at 7/1/2022 1549    Comment:  declined family teaching.      Show all documentation for this point (31)                 Point: Home exercise program (Resolved)     Learning Progress Summary           Patient Acceptance, E,H, VU by  at 6/29/2022 1058      Show all documentation for this point (11)                 Point: Body mechanics (Resolved)     Learning Progress Summary           Patient Acceptance, E,TB,D, VU,NR by  at 6/25/2022 1434      Show all documentation for this point (8)                 Point: Precautions (Resolved)     Learning Progress Summary           Patient Acceptance, E,TB,D, VU,NR by  at 6/25/2022 1434      Show all documentation for this point (8)                             User Key     Initials Effective Dates Name Provider Type Discipline     06/16/21 -  Fany Lima, PT Physical Therapist PT    LB 03/07/18 -  Liz Rodriguez PTA Physical Therapist Assistant PT     06/16/21 -  Alyx Whiting, PT Physical Therapist PT                PT Recommendation and Plan                          Time Calculation:      PT Charges     Row Name 07/02/22 1538             Time Calculation    Start Time 1430  -      Stop Time 1500  -      Time Calculation (min) 30 min  -      PT Received On 07/02/22  -      PT - Next Appointment 07/04/22  -            User Key  (r) = Recorded By, (t) = Taken By, (c) = Cosigned By    Initials Name Provider Type     Alyx Whiting, PT Physical Therapist                Therapy Charges for Today     Code Description Service Date Service Provider Modifiers Qty     [id number]  GAIT TRAINING EA 15 MIN 7/2/2022 Alyx Whiting, PT GP 2        Patient was intermittently wearing a face mask during this therapy encounter. Therapist used appropriate personal protective equipment including mask and gloves.  Mask used was standard procedure mask. Appropriate PPE was worn during the entire therapy session. Hand hygiene was completed before and after therapy session. Patient is not in enhanced droplet precautions.       PT G-Codes  AM-PAC 6 Clicks Score (PT): 17      Alyx Whiting, PT  7/2/2022

## 2022-07-02 NOTE — PROGRESS NOTES
Inpatient Rehabilitation Plan of Care Note    Plan of Care  Care Plan Reviewed - No updates at this time.    Safety    Performed Intervention(s)  Fall precautions: bed low and locked, chair alarm and bed alarms in use, nonskid  socks and gait belt in use, call light and personal belongings within reach.  Hourly rounding.      Psychosocial    Performed Intervention(s)  Provide distraction and/or relaxation as needed.  Allow extra time for patient to verbalize needs, concerns, feelings, etc.      Body Systems    Performed Intervention(s)  Dialysis mwf  Monitor weight and I/O.  DM educator consult.  Accuchecks as ordered.      Sphincter Control    Performed Intervention(s)  Monitor I/O.  Bowel meds prn.      Pain    Performed Intervention(s)  Pain meds prn .  Ice to LLE per order    Signed by: Jessica Sandhu RN

## 2022-07-02 NOTE — PLAN OF CARE
Goal Outcome Evaluation:  Plan of Care Reviewed With: patient        Progress: improving  Outcome Evaluation: Pt ready for DC. Pt going home today. Update: Pt unable to go home. DC on hold.

## 2022-07-02 NOTE — NURSING NOTE
[phone] Pt taken out to car with US and RN. Tried x3 times to assist pt into SUV. Pt unable to physically get into SUV. Asked  if he could bring another car. Pt and  stated that they have a sports car that is low and that pt has never been able to get in or out of the sports car. Pt's  refusing to take pt home since pt unable to get in SUV.  states that if pt is too weak to get into a car then he would be unable to get her to and from dialysis. Pt brought back to her room as pt is still admitted to unit and in [facility]. Call placed to Rehab service to inform.    1915 Dr. Florentino on unit and informed of 's refusal to take pt home due to pt being unable to get into SUV. Dr. Florentino talked to pt and .

## 2022-07-02 NOTE — PROGRESS NOTES
"DAILY PROGRESS NOTE  [facility]    Patient Identification:  Name: Zaina Martinez  Age: 56 y.o.  Sex: female  :  1965  MRN: [id number]         Primary Care Physician: Karson Oreilly MD    Subjective:  Interval History:She complains of leg pain. Had low sugar.    Objective:    Scheduled Meds:aspirin, 81 mg, Oral, Daily  b complex-vitamin c-folic acid, 1 tablet, Oral, Daily  carvedilol, 25 mg, Oral, BID With Meals  cloNIDine, 0.2 mg, Oral, Q12H  epoetin brooke/brooke-epbx, 10,000 Units, Intravenous, Once per day on   gabapentin, 300 mg, Oral, BID  hydrALAZINE, 100 mg, Oral, Q8H  [START ON 7/3/2022] insulin glargine, 7 Units, Subcutaneous, QAM  insulin lispro, 0-7 Units, Subcutaneous, TID AC  levothyroxine, 150 mcg, Oral, Q AM  lidocaine, 1 patch, Transdermal, Q24H  losartan, 100 mg, Oral, Q24H  pantoprazole, 40 mg, Oral, Q AM  rOPINIRole, 0.75 mg, Oral, Nightly  sertraline, 150 mg, Oral, Daily  tamsulosin, 0.4 mg, Oral, Daily      Continuous Infusions:     Vital signs in last 24 hours:  Temp:  [97.3 °F (36.3 °C)-99.2 °F (37.3 °C)] 99.2 °F (37.3 °C)  Heart Rate:  [59-64] 63  Resp:  [17-18] 18  BP: (119-202)/(66-89) 119/66    Intake/Output:    Intake/Output Summary (Last 24 hours) at 2022 1835  Last data filed at 2022 1700  Gross per 24 hour   Intake 790 ml   Output --   Net 790 ml       Exam:  /66 (BP Location: Right arm, Patient Position: Lying)   Pulse 63   Temp 99.2 °F (37.3 °C) (Oral)   Resp 18   Ht 157.5 cm (62.01\")   Wt 73.9 kg (163 lb)   SpO2 92%   BMI 29.81 kg/m²     General Appearance:    Alert, cooperative, no distress   Head:    Normocephalic, without obvious abnormality, atraumatic   Eyes:       Throat:   Lips, tongue, gums normal   Neck:   Supple, symmetrical, trachea midline, no JVD   Lungs:     Clear to auscultation bilaterally, respirations unlabored   Chest Wall:    No tenderness or deformity    Heart:    Regular rate and rhythm, S1 and S2 normal, no " murmur,no  Rub or gallop   Abdomen:     Soft, nontender, bowel sounds active, no masses, no organomegaly    Extremities:   Extremities normal, atraumatic, no cyanosis or edema   Pulses:      Skin:   Skin is warm and dry,  no rashes or palpable lesions   Neurologic:   no focal deficits noted      Lab Results (last 72 hours)     Procedure Component Value Units Date/Time    POC Glucose Once [[id number]]  (Normal) Collected: 07/02/22 1719    Specimen: Blood Updated: 07/02/22 1721     Glucose 102 mg/dL      Comment: Meter: [id number] : [id number] Smooth Miranda RN       POC Glucose Once [[id number]]  (Abnormal) Collected: 07/02/22 1631    Specimen: Blood Updated: 07/02/22 1635     Glucose 41 mg/dL      Comment: RN Notified R and V Meter: [id number] : [id number] Smooth Miranda RN       POC Glucose Once [[id number]]  (Normal) Collected: 07/02/22 1151    Specimen: Blood Updated: 07/02/22 1152     Glucose 97 mg/dL      Comment: Meter: [id number] : [id number] Randolph SAAVEDRA       POC Glucose Once [[id number]]  (Normal) Collected: 07/02/22 1024    Specimen: Blood Updated: 07/02/22 1025     Glucose 120 mg/dL      Comment: Meter: [id number] : [id number] Smooth Miranda RN       Basic Metabolic Panel [[id number]]  (Abnormal) Collected: 07/02/22 0828    Specimen: Blood Updated: 07/02/22 0933     Glucose 65 mg/dL      BUN 40 mg/dL      Creatinine 2.50 mg/dL      Sodium 135 mmol/L      Potassium 5.3 mmol/L      Chloride 98 mmol/L      CO2 24.0 mmol/L      Calcium 7.6 mg/dL      BUN/Creatinine Ratio 16.0     Anion Gap 13.0 mmol/L      eGFR 22.1 mL/min/1.73      Comment: National Kidney Foundation and American Society of Nephrology (ASN) Task Force recommended calculation based on the Chronic Kidney Disease Epidemiology Collaboration (CKD-EPI) equation refit without adjustment for race.       Narrative:      GFR Normal >60  Chronic Kidney Disease <60  Kidney Failure <15      Hepatic Function Panel [[id number]]  (Abnormal)  Collected: 07/02/22 0828    Specimen: Blood Updated: 07/02/22 0931     Total Protein 5.5 g/dL      Albumin 2.80 g/dL      ALT (SGPT) 14 U/L      AST (SGOT) 28 U/L      Alkaline Phosphatase 380 U/L      Total Bilirubin 0.4 mg/dL      Bilirubin, Direct <0.2 mg/dL      Bilirubin, Indirect --     Comment: Unable to calculate       Phosphorus [[id number]]  (Abnormal) Collected: 07/02/22 0828    Specimen: Blood Updated: 07/02/22 0931     Phosphorus 4.6 mg/dL     CBC & Differential [[id number]]  (Abnormal) Collected: 07/02/22 0828    Specimen: Blood Updated: 07/02/22 0904    Narrative:      The following orders were created for panel order CBC & Differential.  Procedure                               Abnormality         Status                     ---------                               -----------         ------                     CBC Auto Differential[[id number]]        Abnormal            Final result                 Please view results for these tests on the individual orders.    CBC Auto Differential [[id number]]  (Abnormal) Collected: 07/02/22 0828    Specimen: Blood Updated: 07/02/22 0904     WBC 21.35 10*3/mm3      RBC 3.21 10*6/mm3      Hemoglobin 8.4 g/dL      Hematocrit 26.8 %      MCV 83.5 fL      MCH 26.2 pg      MCHC 31.3 g/dL      RDW 18.9 %      RDW-SD 56.6 fl      MPV 10.0 fL      Platelets 234 10*3/mm3      Neutrophil % 81.5 %      Lymphocyte % 10.6 %      Monocyte % 6.7 %      Eosinophil % 0.1 %      Basophil % 0.1 %      Immature Grans % 1.0 %      Neutrophils, Absolute 17.40 10*3/mm3      Lymphocytes, Absolute 2.27 10*3/mm3      Monocytes, Absolute 1.42 10*3/mm3      Eosinophils, Absolute 0.02 10*3/mm3      Basophils, Absolute 0.02 10*3/mm3      Immature Grans, Absolute 0.22 10*3/mm3      nRBC 0.0 /100 WBC     POC Glucose Once [[id number]]  (Normal) Collected: 07/02/22 0614    Specimen: Blood Updated: 07/02/22 0615     Glucose 92 mg/dL      Comment: Meter: [id number] : [id number] León SAAVEDRA        POC Glucose Once [[id number]]  (Abnormal) Collected: 07/02/22 0253    Specimen: Blood Updated: 07/02/22 0254     Glucose 140 mg/dL      Comment: Meter: [id number] : [id number] Kushal Choe RN       POC Glucose Once [[id number]]  (Abnormal) Collected: 07/01/22 2036    Specimen: Blood Updated: 07/01/22 2037     Glucose 139 mg/dL      Comment: Meter: [id number] : [id number] León SAAVEDRA       Hepatic Function Panel [[id number]]  (Abnormal) Collected: 07/01/22 1827    Specimen: Blood Updated: 07/01/22 1926     Total Protein 6.0 g/dL      Albumin 3.20 g/dL      ALT (SGPT) 18 U/L      AST (SGOT) 33 U/L      Alkaline Phosphatase 450 U/L      Total Bilirubin 0.5 mg/dL      Bilirubin, Direct 0.2 mg/dL      Bilirubin, Indirect 0.3 mg/dL     Basic Metabolic Panel [[id number]]  (Abnormal) Collected: 07/01/22 1827    Specimen: Blood Updated: 07/01/22 1926     Glucose 75 mg/dL      BUN 34 mg/dL      Creatinine 2.21 mg/dL      Sodium 134 mmol/L      Potassium 5.4 mmol/L      Chloride 97 mmol/L      CO2 24.0 mmol/L      Calcium 7.9 mg/dL      BUN/Creatinine Ratio 15.4     Anion Gap 13.0 mmol/L      eGFR 25.6 mL/min/1.73      Comment: National Kidney Foundation and American Society of Nephrology (ASN) Task Force recommended calculation based on the Chronic Kidney Disease Epidemiology Collaboration (CKD-EPI) equation refit without adjustment for race.       Narrative:      GFR Normal >60  Chronic Kidney Disease <60  Kidney Failure <15      Phosphorus [[id number]]  (Normal) Collected: 07/01/22 1827    Specimen: Blood Updated: 07/01/22 1926     Phosphorus 3.2 mg/dL     CBC & Differential [[id number]]  (Abnormal) Collected: 07/01/22 1827    Specimen: Blood Updated: 07/01/22 1909    Narrative:      The following orders were created for panel order CBC & Differential.  Procedure                               Abnormality         Status                     ---------                               -----------         ------                      CBC Auto Differential[[id number]]        Abnormal            Final result                 Please view results for these tests on the individual orders.    CBC Auto Differential [[id number]]  (Abnormal) Collected: 07/01/22 1827    Specimen: Blood Updated: 07/01/22 1909     WBC 24.21 10*3/mm3      RBC 3.60 10*6/mm3      Hemoglobin 9.4 g/dL      Hematocrit 30.0 %      MCV 83.3 fL      MCH 26.1 pg      MCHC 31.3 g/dL      RDW 19.0 %      RDW-SD 56.4 fl      MPV 10.3 fL      Platelets 288 10*3/mm3      Neutrophil % 94.6 %      Lymphocyte % 2.7 %      Monocyte % 1.4 %      Eosinophil % 0.0 %      Basophil % 0.1 %      Immature Grans % 1.2 %      Neutrophils, Absolute 22.89 10*3/mm3      Lymphocytes, Absolute 0.65 10*3/mm3      Monocytes, Absolute 0.34 10*3/mm3      Eosinophils, Absolute 0.00 10*3/mm3      Basophils, Absolute 0.03 10*3/mm3      Immature Grans, Absolute 0.30 10*3/mm3      nRBC 0.0 /100 WBC     POC Glucose Once [[id number]]  (Abnormal) Collected: 07/01/22 1606    Specimen: Blood Updated: 07/01/22 1607     Glucose 287 mg/dL      Comment: Meter: [id number] : [id number] Dierken Jessica L RN       POC Glucose Once [[id number]]  (Normal) Collected: 07/01/22 1246    Specimen: Blood Updated: 07/01/22 1247     Glucose 126 mg/dL      Comment: Meter: [id number] : [id number] Dierken Jessica L RN       POC Glucose Once [[id number]]  (Normal) Collected: 07/01/22 0639    Specimen: Blood Updated: 07/01/22 0641     Glucose 95 mg/dL      Comment: Meter: [id number] : [id number] Schneider"[employer]iyan CNA       POC Glucose Once [[id number]]  (Abnormal) Collected: 07/01/22 0208    Specimen: Blood Updated: 07/01/22 0210     Glucose 186 mg/dL      Comment: Meter: [id number] : [id number] Schneider Daliyan CNA       POC Glucose Once [[id number]]  (Abnormal) Collected: 06/30/22 2032    Specimen: Blood Updated: 06/30/22 2034     Glucose 251 mg/dL      Comment: Meter: [id number] : [id number] Jennie Gilliam CNA       POC Glucose  Once [[id number]]  (Abnormal) Collected: 06/30/22 1615    Specimen: Blood Updated: 06/30/22 1616     Glucose 197 mg/dL      Comment: Meter: [id number] : [id number] MakAdmitlyadze [email or web address] NA       POC Glucose Once [[id number]]  (Normal) Collected: 06/30/22 1149    Specimen: Blood Updated: 06/30/22 1201     Glucose 100 mg/dL      Comment: Meter: [id number] : [id number] MakAdmitlyadze Firuza NA       POC Glucose Once [[id number]]  (Abnormal) Collected: 06/30/22 1115    Specimen: Blood Updated: 06/30/22 1117     Glucose 68 mg/dL      Comment: Meter: [id number] : [id number] MakAdmitlyadze Firuza NA       POC Glucose Once [[id number]]  (Abnormal) Collected: 06/30/22 1100    Specimen: Blood Updated: 06/30/22 1102     Glucose 55 mg/dL      Comment: Meter: [id number] : [id number] MakAdmitlyadze Firuza NA       POC Glucose Once [[id number]]  (Abnormal) Collected: 06/30/22 1045    Specimen: Blood Updated: 06/30/22 1045     Glucose 55 mg/dL      Comment: Meter: [id number] : [id number] Edson MCELROY RN       Basic Metabolic Panel [[id number]]  (Abnormal) Collected: 06/30/22 0912    Specimen: Blood Updated: 06/30/22 1041     Glucose 91 mg/dL      BUN 43 mg/dL      Creatinine 2.61 mg/dL      Sodium 134 mmol/L      Potassium 5.3 mmol/L      Chloride 100 mmol/L      CO2 23.0 mmol/L      Calcium 7.6 mg/dL      BUN/Creatinine Ratio 16.5     Anion Gap 11.0 mmol/L      eGFR 21.0 mL/min/1.73      Comment: National Kidney Foundation and American Society of Nephrology (ASN) Task Force recommended calculation based on the Chronic Kidney Disease Epidemiology Collaboration (CKD-EPI) equation refit without adjustment for race.       Narrative:      GFR Normal >60  Chronic Kidney Disease <60  Kidney Failure <15      POC Glucose Once [[id number]]  (Abnormal) Collected: 06/30/22 1034    Specimen: Blood Updated: 06/30/22 1040     Glucose 43 mg/dL      Comment: Result Not Confirmed Meter: [id number] : [id number] Edson MCELROY RN       Hepatic  Function Panel [[id number]]  (Abnormal) Collected: 06/30/22 0912    Specimen: Blood Updated: 06/30/22 1031     Total Protein 5.9 g/dL      Albumin 2.60 g/dL      ALT (SGPT) 15 U/L      AST (SGOT) 30 U/L      Alkaline Phosphatase 426 U/L      Total Bilirubin 0.4 mg/dL      Bilirubin, Direct 0.3 mg/dL      Bilirubin, Indirect 0.1 mg/dL     Phosphorus [[id number]]  (Normal) Collected: 06/30/22 0912    Specimen: Blood Updated: 06/30/22 1031     Phosphorus 4.0 mg/dL     CBC & Differential [[id number]]  (Abnormal) Collected: 06/30/22 0912    Specimen: Blood Updated: 06/30/22 1010    Narrative:      The following orders were created for panel order CBC & Differential.  Procedure                               Abnormality         Status                     ---------                               -----------         ------                     CBC Auto Differential[[id number]]        Abnormal            Final result                 Please view results for these tests on the individual orders.    CBC Auto Differential [[id number]]  (Abnormal) Collected: 06/30/22 0912    Specimen: Blood Updated: 06/30/22 1010     WBC 19.91 10*3/mm3      RBC 3.25 10*6/mm3      Hemoglobin 8.5 g/dL      Hematocrit 27.2 %      MCV 83.7 fL      MCH 26.2 pg      MCHC 31.3 g/dL      RDW 18.1 %      RDW-SD 53.9 fl      MPV 10.0 fL      Platelets 315 10*3/mm3      Neutrophil % 81.8 %      Lymphocyte % 11.2 %      Monocyte % 5.4 %      Eosinophil % 0.2 %      Basophil % 0.1 %      Immature Grans % 1.3 %      Neutrophils, Absolute 16.30 10*3/mm3      Lymphocytes, Absolute 2.23 10*3/mm3      Monocytes, Absolute 1.07 10*3/mm3      Eosinophils, Absolute 0.03 10*3/mm3      Basophils, Absolute 0.02 10*3/mm3      Immature Grans, Absolute 0.26 10*3/mm3      nRBC 0.1 /100 WBC     POC Glucose Once [[id number]]  (Normal) Collected: 06/30/22 0631    Specimen: Blood Updated: 06/30/22 0633     Glucose 117 mg/dL      Comment: Meter: [id number] : [id number] Jignesh  Inge NA       POC Glucose Once [[id number]]  (Abnormal) Collected: 06/30/22 0310    Specimen: Blood Updated: 06/30/22 0311     Glucose 181 mg/dL      Comment: Meter: [id number] : [id number] Smyzer Inge NA       POC Glucose Once [[id number]]  (Abnormal) Collected: 06/29/22 2046    Specimen: Blood Updated: 06/29/22 2048     Glucose 219 mg/dL      Comment: Meter: [id number] : [id number] Smyzer Inge NA       POC Glucose Once [[id number]]  (Normal) Collected: 06/29/22 1901    Specimen: Blood Updated: 06/29/22 1902     Glucose 101 mg/dL      Comment: Meter: [id number] : [id number] Cricket Harley NA       POC Glucose Once [[id number]]  (Abnormal) Collected: 06/29/22 1842    Specimen: Blood Updated: 06/29/22 1844     Glucose 56 mg/dL      Comment: Meter: [id number] : [id number] Anup SAAVEDRA           Data Review:  Results from last 7 days   Lab Units 07/02/22  0828 07/01/22 1827 06/30/22  0912   SODIUM mmol/L 135* 134* 134*   POTASSIUM mmol/L 5.3* 5.4* 5.3*   CHLORIDE mmol/L 98 97* 100   CO2 mmol/L 24.0 24.0 23.0   BUN mg/dL 40* 34* 43*   CREATININE mg/dL 2.50* 2.21* 2.61*   GLUCOSE mg/dL 65 75 91   CALCIUM mg/dL 7.6* 7.9* 7.6*     Results from last 7 days   Lab Units 07/02/22  0828 07/01/22 1827 06/30/22  0912   WBC 10*3/mm3 21.35* 24.21* 19.91*   HEMOGLOBIN g/dL 8.4* 9.4* 8.5*   HEMATOCRIT % 26.8* 30.0* 27.2*   PLATELETS 10*3/mm3 234 288 315             Lab Results   Lab Value Date/Time    TROPONINT 0.092 (C) 12/19/2021 1314    TROPONINT 0.117 (C) 11/30/2021 0153    TROPONINT 0.132 (C) 11/29/2021 2121         Results from last 7 days   Lab Units 07/02/22  0828 07/01/22  1827 06/30/22  0912   ALK PHOS U/L 380* 450* 426*   BILIRUBIN mg/dL 0.4 0.5 0.4   BILIRUBIN DIRECT mg/dL <0.2 0.2 0.3   ALT (SGPT) U/L 14 18 15   AST (SGOT) U/L 28 33* 30             Glucose   Date/Time Value Ref Range Status   07/02/2022 1719 102 70 - 130 mg/dL Final     Comment:     Meter: [id number] :  [id number] Smooth Miranda RN   07/02/2022 1631 41 (C) 70 - 130 mg/dL Final     Comment:     RN Notified R and V Meter: [id number] : [id number] Smooth Miranda RN   07/02/2022 1151 97 70 - 130 mg/dL Final     Comment:     Meter: [id number] : [id number] Randolph Melissa NA   07/02/2022 1024 120 70 - 130 mg/dL Final     Comment:     Meter: [id number] : [id number] Smooth Miranda RN   07/02/2022 0614 92 70 - 130 mg/dL Final     Comment:     Meter: [id number] : [id number] [facility]   07/02/2022 0253 140 (H) 70 - 130 mg/dL Final     Comment:     Meter: [id number] : [id number] Kushal Choe RN   07/01/2022 2036 139 (H) 70 - 130 mg/dL Final     Comment:     Meter: [id number] : [id number] [facility]   07/01/2022 1606 287 (H) 70 - 130 mg/dL Final     Comment:     Meter: [id number] : [id number] Edson MCELROY RN           Past Medical History:   Diagnosis Date   • Acute CVA (cerebrovascular accident) ([facility]) 5/1/2022   • Acute on chronic diastolic CHF (congestive heart failure) ([facility])    • CAD (coronary artery disease) 12/20/2021   • Diabetes ([facility])    • Disease of thyroid gland    • GERD (gastroesophageal reflux disease)    • Hyperlipidemia 11/30/2018   • Hypertension    • Rheumatoid arthritis ([facility])        Assessment:  Active Hospital Problems    Diagnosis  POA   • 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, 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       Plan:  Insulin was decreased for hypoglycemia.  Continue same and monitor.    Aris DAVALOS  MD Sukhi  7/2/2022  18:35 EDT

## 2022-07-02 NOTE — PROGRESS NOTES
Inpatient Rehabilitation Plan of Care Note    Plan of Care  Care Plan Reviewed - No updates at this time.    Safety    Performed Intervention(s)  Fall precautions: bed low and locked, chair alarm and bed alarms in use, nonskid  socks and gait belt in use, call light and personal belongings within reach.  Hourly rounding.      Psychosocial    Performed Intervention(s)  Provide distraction and/or relaxation as needed.  Allow extra time for patient to verbalize needs, concerns, feelings, etc.      Body Systems    Performed Intervention(s)  Dialysis mwf  Monitor weight and I/O.  DM educator consult.  Accuchecks as ordered.      Sphincter Control    Performed Intervention(s)  Monitor I/O.  Bowel meds prn.      Pain    Performed Intervention(s)  Pain meds prn .  Ice to LLE per order    Signed by: Ruth Rebollar RN

## 2022-07-02 NOTE — PROGRESS NOTES
[facility]     Progress Note    Patient Name: Zaina Martinez  : 1965  MRN: [id number]  Primary Care Physician:  Karson Oreilly MD  Date of admission: 2022    Subjective  Pt is awake and alert. Pt continues to c/o L thigh pain.   Subjective     Chief Complaint: same    History of Present Illness  Patient Reports same    Review of Systems    Objective exam unchanged calves soft and NT. Resp un;labored and regular  Objective     Vitals:   Temp:  [97.3 °F (36.3 °C)-98.6 °F (37 °C)] 97.7 °F (36.5 °C)  Heart Rate:  [59-66] 59  Resp:  [16-18] 18  BP: (151-202)/(73-89) 160/75    Physical Exam     Result Review    Result Review:  I have personally reviewed the results from the time of this admission to 2022 12:24 EDT and agree with these findings:  []  Laboratory list / accordion  []  Microbiology  []  Radiology  []  EKG/Telemetry   []  Cardiology/Vascular   []  Pathology  []  Old records  []  Other:  Most notable findings include: CMP Na+135, K+5.3, creat 2.5, SWEETIE 40, Alk Phos 380, Alb 2.8,  CBC WBC 21.35, H/H 8.4/26.8,       Assessment & Plan  Continue to prepare for dc. Will addjust insulin again.   Assessment / Plan     Brief Patient Summary:  Zaina Martinez is a 56 y.o. female who    Active Hospital Problems:  Active Hospital Problems    Diagnosis    • Diabetic muscle infarction (HCC)    • Other insomnia    • Abnormal urinalysis    • Stroke (HCC)    • Chronic diastolic CHF (congestive heart failure) (HCC)    • ESRD (end stage renal disease) ([facility])    • Hypothyroidism    • Hyperlipidemia    • Type 2 diabetes mellitus with kidney complication, with long-term current use of insulin (HCC)      Plan:       DVT prophylaxis:  Medical and mechanical DVT prophylaxis orders are present.    CODE STATUS:    Level Of Support Discussed With: Patient  Code Status (Patient has no pulse and is not breathing): CPR (Attempt to Resuscitate)  Medical Interventions (Patient has pulse or is breathing): Full  Support    Disposition:  I expect patient to be discharged .    Dinesh Joyce MD

## 2022-07-03 LAB
ALBUMIN SERPL-MCNC: 2.5 G/DL (ref 3.5–5.2)
ALP SERPL-CCNC: 436 U/L (ref 39–117)
ALT SERPL W P-5'-P-CCNC: 36 U/L (ref 1–33)
ANION GAP SERPL CALCULATED.3IONS-SCNC: 10.1 MMOL/L (ref 5–15)
ANION GAP SERPL CALCULATED.3IONS-SCNC: 12 MMOL/L (ref 5–15)
AST SERPL-CCNC: 64 U/L (ref 1–32)
BASOPHILS # BLD AUTO: 0.03 10*3/MM3 (ref 0–0.2)
BASOPHILS NFR BLD AUTO: 0.1 % (ref 0–1.5)
BILIRUB CONJ SERPL-MCNC: 0.3 MG/DL (ref 0–0.3)
BILIRUB INDIRECT SERPL-MCNC: 0.2 MG/DL
BILIRUB SERPL-MCNC: 0.5 MG/DL (ref 0–1.2)
BUN SERPL-MCNC: 56 MG/DL (ref 6–20)
BUN SERPL-MCNC: 57 MG/DL (ref 6–20)
BUN/CREAT SERPL: 16.1 (ref 7–25)
BUN/CREAT SERPL: 16.1 (ref 7–25)
CALCIUM SPEC-SCNC: 7.3 MG/DL (ref 8.6–10.5)
CALCIUM SPEC-SCNC: 7.5 MG/DL (ref 8.6–10.5)
CHLORIDE SERPL-SCNC: 97 MMOL/L (ref 98–107)
CHLORIDE SERPL-SCNC: 99 MMOL/L (ref 98–107)
CO2 SERPL-SCNC: 23.9 MMOL/L (ref 22–29)
CO2 SERPL-SCNC: 24 MMOL/L (ref 22–29)
CREAT SERPL-MCNC: 3.48 MG/DL (ref 0.57–1)
CREAT SERPL-MCNC: 3.53 MG/DL (ref 0.57–1)
DEPRECATED RDW RBC AUTO: 56.4 FL (ref 37–54)
EGFRCR SERPLBLD CKD-EPI 2021: 14.6 ML/MIN/1.73
EGFRCR SERPLBLD CKD-EPI 2021: 14.8 ML/MIN/1.73
EOSINOPHIL # BLD AUTO: 0.1 10*3/MM3 (ref 0–0.4)
EOSINOPHIL NFR BLD AUTO: 0.4 % (ref 0.3–6.2)
ERYTHROCYTE [DISTWIDTH] IN BLOOD BY AUTOMATED COUNT: 18.4 % (ref 12.3–15.4)
GLUCOSE BLDC GLUCOMTR-MCNC: 126 MG/DL (ref 70–130)
GLUCOSE BLDC GLUCOMTR-MCNC: 142 MG/DL (ref 70–130)
GLUCOSE BLDC GLUCOMTR-MCNC: 63 MG/DL (ref 70–130)
GLUCOSE BLDC GLUCOMTR-MCNC: 78 MG/DL (ref 70–130)
GLUCOSE BLDC GLUCOMTR-MCNC: 99 MG/DL (ref 70–130)
GLUCOSE SERPL-MCNC: 117 MG/DL (ref 65–99)
GLUCOSE SERPL-MCNC: 165 MG/DL (ref 65–99)
HBV SURFACE AG SERPL QL IA: NORMAL
HCT VFR BLD AUTO: 27 % (ref 34–46.6)
HGB BLD-MCNC: 8.3 G/DL (ref 12–15.9)
IMM GRANULOCYTES # BLD AUTO: 0.16 10*3/MM3 (ref 0–0.05)
IMM GRANULOCYTES NFR BLD AUTO: 0.7 % (ref 0–0.5)
LYMPHOCYTES # BLD AUTO: 1.22 10*3/MM3 (ref 0.7–3.1)
LYMPHOCYTES NFR BLD AUTO: 5.2 % (ref 19.6–45.3)
MCH RBC QN AUTO: 26.3 PG (ref 26.6–33)
MCHC RBC AUTO-ENTMCNC: 30.7 G/DL (ref 31.5–35.7)
MCV RBC AUTO: 85.4 FL (ref 79–97)
MONOCYTES # BLD AUTO: 1.61 10*3/MM3 (ref 0.1–0.9)
MONOCYTES NFR BLD AUTO: 6.9 % (ref 5–12)
NEUTROPHILS NFR BLD AUTO: 20.19 10*3/MM3 (ref 1.7–7)
NEUTROPHILS NFR BLD AUTO: 86.7 % (ref 42.7–76)
NRBC BLD AUTO-RTO: 0 /100 WBC (ref 0–0.2)
PHOSPHATE SERPL-MCNC: 5.8 MG/DL (ref 2.5–4.5)
PLATELET # BLD AUTO: 206 10*3/MM3 (ref 140–450)
PMV BLD AUTO: 10.7 FL (ref 6–12)
POTASSIUM SERPL-SCNC: 6.9 MMOL/L (ref 3.5–5.2)
POTASSIUM SERPL-SCNC: 7.3 MMOL/L (ref 3.5–5.2)
PROT SERPL-MCNC: 5.7 G/DL (ref 6–8.5)
RBC # BLD AUTO: 3.16 10*6/MM3 (ref 3.77–5.28)
SODIUM SERPL-SCNC: 131 MMOL/L (ref 136–145)
SODIUM SERPL-SCNC: 135 MMOL/L (ref 136–145)
WBC NRBC COR # BLD: 23.31 10*3/MM3 (ref 3.4–10.8)

## 2022-07-03 PROCEDURE — 80048 BASIC METABOLIC PNL TOTAL CA: CPT | Performed by: INTERNAL MEDICINE

## 2022-07-03 PROCEDURE — 80076 HEPATIC FUNCTION PANEL: CPT | Performed by: PHYSICAL MEDICINE & REHABILITATION

## 2022-07-03 PROCEDURE — 87340 HEPATITIS B SURFACE AG IA: CPT | Performed by: HOSPITALIST

## 2022-07-03 PROCEDURE — 84100 ASSAY OF PHOSPHORUS: CPT | Performed by: INTERNAL MEDICINE

## 2022-07-03 PROCEDURE — 85025 COMPLETE CBC W/AUTO DIFF WBC: CPT | Performed by: PHYSICAL MEDICINE & REHABILITATION

## 2022-07-03 PROCEDURE — 82962 GLUCOSE BLOOD TEST: CPT

## 2022-07-03 RX ADMIN — ROPINIROLE HYDROCHLORIDE 0.75 MG: 0.5 TABLET, FILM COATED ORAL at 21:02

## 2022-07-03 RX ADMIN — OXYCODONE 5 MG: 5 TABLET ORAL at 21:03

## 2022-07-03 RX ADMIN — TAMSULOSIN HYDROCHLORIDE 0.4 MG: 0.4 CAPSULE ORAL at 09:20

## 2022-07-03 RX ADMIN — OXYCODONE 5 MG: 5 TABLET ORAL at 13:49

## 2022-07-03 RX ADMIN — OXYCODONE 5 MG: 5 TABLET ORAL at 09:21

## 2022-07-03 RX ADMIN — PANTOPRAZOLE SODIUM 40 MG: 40 TABLET, DELAYED RELEASE ORAL at 06:25

## 2022-07-03 RX ADMIN — CLONIDINE HYDROCHLORIDE 0.2 MG: 0.1 TABLET ORAL at 09:20

## 2022-07-03 RX ADMIN — Medication 1 TABLET: at 09:20

## 2022-07-03 RX ADMIN — HYDRALAZINE HYDROCHLORIDE 100 MG: 50 TABLET, FILM COATED ORAL at 06:25

## 2022-07-03 RX ADMIN — ASPIRIN 81 MG: 81 TABLET, COATED ORAL at 09:20

## 2022-07-03 RX ADMIN — GABAPENTIN 300 MG: 300 CAPSULE ORAL at 09:21

## 2022-07-03 RX ADMIN — CLONIDINE HYDROCHLORIDE 0.2 MG: 0.1 TABLET ORAL at 21:02

## 2022-07-03 RX ADMIN — CARVEDILOL 25 MG: 25 TABLET, FILM COATED ORAL at 21:03

## 2022-07-03 RX ADMIN — CARVEDILOL 25 MG: 25 TABLET, FILM COATED ORAL at 09:20

## 2022-07-03 RX ADMIN — SERTRALINE 150 MG: 100 TABLET, FILM COATED ORAL at 09:20

## 2022-07-03 RX ADMIN — INSULIN GLARGINE-YFGN 7 UNITS: 100 INJECTION, SOLUTION SUBCUTANEOUS at 09:23

## 2022-07-03 RX ADMIN — ZOLPIDEM TARTRATE 5 MG: 5 TABLET ORAL at 21:03

## 2022-07-03 RX ADMIN — ACETAMINOPHEN 500 MG: 500 TABLET, FILM COATED ORAL at 13:49

## 2022-07-03 RX ADMIN — GABAPENTIN 300 MG: 300 CAPSULE ORAL at 21:03

## 2022-07-03 RX ADMIN — LOSARTAN POTASSIUM 100 MG: 100 TABLET, FILM COATED ORAL at 09:20

## 2022-07-03 RX ADMIN — LEVOTHYROXINE SODIUM 150 MCG: 0.15 TABLET ORAL at 06:25

## 2022-07-03 NOTE — PLAN OF CARE
Goal Outcome Evaluation:           Progress: no change  Outcome Evaluation: A&OX4. Memory has improved. Pt. Slept most of the day. Given PRN pain meds q 4 hours. Ate nothing at BF and lunch. Pt. Complained she did not feel well. Afebrile. Potassium critically elevated at 6.9. Called Nephrologist. He ordered STAT BMP and STAT dialysis. Orders called in to dialysis. Full code. Meds whole with water. Bladder scan q 8 hours and cath if more than 300ml. Continent and incontinent of B&B. Wears a brief. Last BM y/d. Lantus decreased y/d. Blood glucose levels more regulated today. WBC still elevated. Pt. Called  to report her medical issues.

## 2022-07-03 NOTE — PLAN OF CARE
Goal Outcome Evaluation:         Pt A/Ox4, not OOB overnight. Meds taken whole w thin liquids. Pt c/o pain to L upper medial thigh/ groin ; prn Oxycodone given. Prn Ambien given for sleep. Pt drowsy this morning. No voiding overnight. Bladder scan completed. Juice given early this morning to prevent hypoglycemia.

## 2022-07-03 NOTE — PROGRESS NOTES
[facility]     Progress Note    Patient Name: Zaina Martinez  : 1965  MRN: [id number]  Primary Care Physician:  Karson Oreilly MD  Date of admission: 2022    Subjective Pt is easily awakened. Pt continues to have pain in L medial prox thigh.   Subjective     Chief Complaint: same    History of Present Illness  Patient Reports     Review of Systems    Objective  exam unchanged calves soft and NT. resp unlabored and regular  Objective     Vitals:   Temp:  [97.9 °F (36.6 °C)-99.9 °F (37.7 °C)] 98.6 °F (37 °C)  Heart Rate:  [60-63] 62  Resp:  [14-18] 14  BP: (116-132)/(66-74) 128/74    Physical Exam     Result Review    Result Review:  I have personally reviewed the results from the time of this admission to 7/3/2022 11:34 EDT and agree with these findings:  []  Laboratory list / accordion  []  Microbiology  []  Radiology  []  EKG/Telemetry   []  Cardiology/Vascular   []  Pathology  []  Old records  []  Other:  Most notable findings include:       Assessment & Plan Continue to prepare for dc.   Assessment / Plan     Brief Patient Summary:  Zaina Martinez is a 56 y.o. female who     Active Hospital Problems:  Active Hospital Problems    Diagnosis    • Diabetic muscle infarction (HCC)    • Other insomnia    • Abnormal urinalysis    • Stroke (HCC)    • Chronic diastolic CHF (congestive heart failure) ([facility])    • ESRD (end stage renal disease) ([facility])    • Hypothyroidism    • Hyperlipidemia    • Type 2 diabetes mellitus with kidney complication, with long-term current use of insulin (HCC)      Plan:       DVT prophylaxis:  Medical and mechanical DVT prophylaxis orders are present.    CODE STATUS:    Level Of Support Discussed With: Patient  Code Status (Patient has no pulse and is not breathing): CPR (Attempt to Resuscitate)  Medical Interventions (Patient has pulse or is breathing): Full Support    Disposition:  I expect patient to be discharged .    Dinesh Joyce MD

## 2022-07-03 NOTE — PROGRESS NOTES
[facility] Progress Note      Patient Name: Zaina Martinez  : 1965  MRN: [id number]  Primary Care Physician:  Karson Oreilly MD  Date of admission: 2022    Subjective     Interval History:   Follow up ESRD    Seen and examined.  No shortness of air or chest pain    Review of Systems:   As noted above    Objective     Vitals:   Temp:  [97.9 °F (36.6 °C)-99.9 °F (37.7 °C)] 98.6 °F (37 °C)  Heart Rate:  [60-63] 62  Resp:  [14-18] 14  BP: (116-132)/(66-74) 128/74    Intake/Output Summary (Last 24 hours) at 7/3/2022 1109  Last data filed at 2022 1700  Gross per 24 hour   Intake 500 ml   Output --   Net 500 ml       Physical Exam:   General: ALert NAD chronically ill  HEENT: AT/NC; periorbital edema noted  Neck: RIJ TDC  Lungs: clear to auscultation  Heart: RRR no s3 or rub. 2/6 systolic murmur  Abdomen: +bs, soft, not distended  Extremities: 1+ pitting edema bilateral extremities.  Neuro:  Moves all 4 ext.     Scheduled Meds:     aspirin, 81 mg, Oral, Daily  b complex-vitamin c-folic acid, 1 tablet, Oral, Daily  carvedilol, 25 mg, Oral, BID With Meals  cloNIDine, 0.2 mg, Oral, Q12H  epoetin brooke/brooke-epbx, 10,000 Units, Intravenous, Once per day on   gabapentin, 300 mg, Oral, BID  hydrALAZINE, 100 mg, Oral, Q8H  insulin glargine, 7 Units, Subcutaneous, QAM  insulin lispro, 0-7 Units, Subcutaneous, TID AC  levothyroxine, 150 mcg, Oral, Q AM  lidocaine, 1 patch, Transdermal, Q24H  losartan, 100 mg, Oral, Q24H  pantoprazole, 40 mg, Oral, Q AM  rOPINIRole, 0.75 mg, Oral, Nightly  sertraline, 150 mg, Oral, Daily  tamsulosin, 0.4 mg, Oral, Daily      IV Meds:        Results Reviewed:   I have personally reviewed the results from the time of this admission to 7/3/2022 11:09 EDT     Results from last 7 days   Lab Units 22  0927 22  0828 22  1827 22  0912   SODIUM mmol/L  --  135* 134* 134*   POTASSIUM mmol/L  --  5.3* 5.4* 5.3*   CHLORIDE mmol/L   --  98 97* 100   CO2 mmol/L  --  24.0 24.0 23.0   BUN mg/dL  --  40* 34* 43*   CREATININE mg/dL  --  2.50* 2.21* 2.61*   CALCIUM mg/dL  --  7.6* 7.9* 7.6*   BILIRUBIN mg/dL 0.5 0.4 0.5 0.4   ALK PHOS U/L 436* 380* 450* 426*   ALT (SGPT) U/L 36* 14 18 15   AST (SGOT) U/L 64* 28 33* 30   GLUCOSE mg/dL  --  65 75 91       Estimated Creatinine Clearance: 23.6 mL/min (A) (by C-G formula based on SCr of 2.5 mg/dL (H)).    Results from last 7 days   Lab Units 07/02/22 0828 07/01/22 1827 06/30/22  0912   PHOSPHORUS mg/dL 4.6* 3.2 4.0             Results from last 7 days   Lab Units 07/03/22 0927 07/02/22 0828 07/01/22 1827 06/30/22  0912 06/29/22  1139   WBC 10*3/mm3 23.31* 21.35* 24.21* 19.91* 23.61*   HEMOGLOBIN g/dL 8.3* 8.4* 9.4* 8.5* 8.7*   PLATELETS 10*3/mm3 206 234 288 315 340             Assessment / Plan     ASSESSMENT:    1.  ESRD, secondary to diabetic and hypertensive glomerulosclerosis; usual HD schedule is MWF.  Her volume status is generous.  2.  Intracerebral bleed and altered mental status  3.  Infected TDC, s/p removal 5/1. Replaced.    Concluded vancomycin with last dose given 5/26  4.  DM2    5.  Coronary artery disease  6.  Acute on chronic diastolic dysfunction  7.  Anemia of CKD, on long-acting ANDRAE as an outpatient. Trying to minimize transfusions unless Hgb less than 7. Iron panel in favor iron deficiency anemia.  8.  Hyponatremia.  Improved on dialysis  9. Hyperkalemia      PLAN:    -Plan for stat dialysis due to elevated potassium.  And will will plan to continue dialysis on Monday Wednesday Friday.  Patient remains swollen and we plan to challenge her weight as tolerated  -Surveillance labs.    Ruthann Palmer MD  07/03/22  11:09 EDT    [facility]  [phone]

## 2022-07-04 LAB
ALBUMIN SERPL-MCNC: 2.8 G/DL (ref 3.5–5.2)
ALP SERPL-CCNC: 390 U/L (ref 39–117)
ALT SERPL W P-5'-P-CCNC: 27 U/L (ref 1–33)
ANION GAP SERPL CALCULATED.3IONS-SCNC: 10 MMOL/L (ref 5–15)
AST SERPL-CCNC: 41 U/L (ref 1–32)
BASOPHILS # BLD AUTO: 0.02 10*3/MM3 (ref 0–0.2)
BASOPHILS NFR BLD AUTO: 0.1 % (ref 0–1.5)
BILIRUB CONJ SERPL-MCNC: 0.2 MG/DL (ref 0–0.3)
BILIRUB INDIRECT SERPL-MCNC: 0.2 MG/DL
BILIRUB SERPL-MCNC: 0.4 MG/DL (ref 0–1.2)
BUN SERPL-MCNC: 33 MG/DL (ref 6–20)
BUN/CREAT SERPL: 14.2 (ref 7–25)
C DIFF TOX GENS STL QL NAA+PROBE: NEGATIVE
CALCIUM SPEC-SCNC: 7.4 MG/DL (ref 8.6–10.5)
CHLORIDE SERPL-SCNC: 99 MMOL/L (ref 98–107)
CO2 SERPL-SCNC: 25 MMOL/L (ref 22–29)
CREAT SERPL-MCNC: 2.33 MG/DL (ref 0.57–1)
DEPRECATED RDW RBC AUTO: 54 FL (ref 37–54)
EGFRCR SERPLBLD CKD-EPI 2021: 24 ML/MIN/1.73
EOSINOPHIL # BLD AUTO: 0.07 10*3/MM3 (ref 0–0.4)
EOSINOPHIL NFR BLD AUTO: 0.4 % (ref 0.3–6.2)
ERYTHROCYTE [DISTWIDTH] IN BLOOD BY AUTOMATED COUNT: 18.3 % (ref 12.3–15.4)
GLUCOSE BLDC GLUCOMTR-MCNC: 104 MG/DL (ref 70–130)
GLUCOSE BLDC GLUCOMTR-MCNC: 111 MG/DL (ref 70–130)
GLUCOSE BLDC GLUCOMTR-MCNC: 140 MG/DL (ref 70–130)
GLUCOSE BLDC GLUCOMTR-MCNC: 143 MG/DL (ref 70–130)
GLUCOSE BLDC GLUCOMTR-MCNC: 207 MG/DL (ref 70–130)
GLUCOSE BLDC GLUCOMTR-MCNC: 253 MG/DL (ref 70–130)
GLUCOSE SERPL-MCNC: 138 MG/DL (ref 65–99)
HCT VFR BLD AUTO: 24.4 % (ref 34–46.6)
HGB BLD-MCNC: 8 G/DL (ref 12–15.9)
IMM GRANULOCYTES # BLD AUTO: 0.11 10*3/MM3 (ref 0–0.05)
IMM GRANULOCYTES NFR BLD AUTO: 0.6 % (ref 0–0.5)
LYMPHOCYTES # BLD AUTO: 1.12 10*3/MM3 (ref 0.7–3.1)
LYMPHOCYTES NFR BLD AUTO: 6.3 % (ref 19.6–45.3)
MCH RBC QN AUTO: 26.8 PG (ref 26.6–33)
MCHC RBC AUTO-ENTMCNC: 32.8 G/DL (ref 31.5–35.7)
MCV RBC AUTO: 81.9 FL (ref 79–97)
MONOCYTES # BLD AUTO: 1.02 10*3/MM3 (ref 0.1–0.9)
MONOCYTES NFR BLD AUTO: 5.8 % (ref 5–12)
NEUTROPHILS NFR BLD AUTO: 15.32 10*3/MM3 (ref 1.7–7)
NEUTROPHILS NFR BLD AUTO: 86.8 % (ref 42.7–76)
NRBC BLD AUTO-RTO: 0 /100 WBC (ref 0–0.2)
PHOSPHATE SERPL-MCNC: 5.3 MG/DL (ref 2.5–4.5)
PLATELET # BLD AUTO: 182 10*3/MM3 (ref 140–450)
PMV BLD AUTO: 10.3 FL (ref 6–12)
POTASSIUM SERPL-SCNC: 6.1 MMOL/L (ref 3.5–5.2)
PROT SERPL-MCNC: 5.1 G/DL (ref 6–8.5)
RBC # BLD AUTO: 2.98 10*6/MM3 (ref 3.77–5.28)
SODIUM SERPL-SCNC: 134 MMOL/L (ref 136–145)
WBC NRBC COR # BLD: 17.66 10*3/MM3 (ref 3.4–10.8)

## 2022-07-04 PROCEDURE — 25010000002 HEPARIN (PORCINE) PER 1000 UNITS: Performed by: PHYSICAL MEDICINE & REHABILITATION

## 2022-07-04 PROCEDURE — 85025 COMPLETE CBC W/AUTO DIFF WBC: CPT | Performed by: PHYSICAL MEDICINE & REHABILITATION

## 2022-07-04 PROCEDURE — 97110 THERAPEUTIC EXERCISES: CPT

## 2022-07-04 PROCEDURE — 25010000002 EPOETIN ALFA-EPBX 10000 UNIT/ML SOLUTION: Performed by: PHYSICAL MEDICINE & REHABILITATION

## 2022-07-04 PROCEDURE — 80048 BASIC METABOLIC PNL TOTAL CA: CPT | Performed by: INTERNAL MEDICINE

## 2022-07-04 PROCEDURE — 97116 GAIT TRAINING THERAPY: CPT

## 2022-07-04 PROCEDURE — 80076 HEPATIC FUNCTION PANEL: CPT | Performed by: PHYSICAL MEDICINE & REHABILITATION

## 2022-07-04 PROCEDURE — 82962 GLUCOSE BLOOD TEST: CPT

## 2022-07-04 PROCEDURE — 97110 THERAPEUTIC EXERCISES: CPT | Performed by: OCCUPATIONAL THERAPIST

## 2022-07-04 PROCEDURE — 63710000001 INSULIN LISPRO (HUMAN) PER 5 UNITS: Performed by: HOSPITALIST

## 2022-07-04 PROCEDURE — 84100 ASSAY OF PHOSPHORUS: CPT | Performed by: INTERNAL MEDICINE

## 2022-07-04 PROCEDURE — 87493 C DIFF AMPLIFIED PROBE: CPT | Performed by: PHYSICAL MEDICINE & REHABILITATION

## 2022-07-04 RX ORDER — DIPHENOXYLATE HYDROCHLORIDE AND ATROPINE SULFATE 2.5; .025 MG/1; MG/1
1 TABLET ORAL
Status: DISCONTINUED | OUTPATIENT
Start: 2022-07-04 | End: 2022-07-22 | Stop reason: HOSPADM

## 2022-07-04 RX ORDER — DIPHENOXYLATE HYDROCHLORIDE AND ATROPINE SULFATE 2.5; .025 MG/1; MG/1
1 TABLET ORAL 4 TIMES DAILY PRN
Status: DISCONTINUED | OUTPATIENT
Start: 2022-07-04 | End: 2022-07-04

## 2022-07-04 RX ADMIN — HYDRALAZINE HYDROCHLORIDE 100 MG: 50 TABLET, FILM COATED ORAL at 21:19

## 2022-07-04 RX ADMIN — OXYCODONE 5 MG: 5 TABLET ORAL at 08:20

## 2022-07-04 RX ADMIN — TAMSULOSIN HYDROCHLORIDE 0.4 MG: 0.4 CAPSULE ORAL at 08:20

## 2022-07-04 RX ADMIN — CARVEDILOL 25 MG: 25 TABLET, FILM COATED ORAL at 08:19

## 2022-07-04 RX ADMIN — GABAPENTIN 300 MG: 300 CAPSULE ORAL at 08:20

## 2022-07-04 RX ADMIN — OXYCODONE 5 MG: 5 TABLET ORAL at 22:08

## 2022-07-04 RX ADMIN — ROPINIROLE HYDROCHLORIDE 0.75 MG: 0.5 TABLET, FILM COATED ORAL at 21:12

## 2022-07-04 RX ADMIN — CARVEDILOL 25 MG: 25 TABLET, FILM COATED ORAL at 17:54

## 2022-07-04 RX ADMIN — CLONIDINE HYDROCHLORIDE 0.2 MG: 0.1 TABLET ORAL at 21:12

## 2022-07-04 RX ADMIN — MICONAZOLE NITRATE 1 APPLICATION: 2 CREAM TOPICAL at 14:58

## 2022-07-04 RX ADMIN — LOSARTAN POTASSIUM 100 MG: 100 TABLET, FILM COATED ORAL at 08:20

## 2022-07-04 RX ADMIN — INSULIN GLARGINE-YFGN 7 UNITS: 100 INJECTION, SOLUTION SUBCUTANEOUS at 08:18

## 2022-07-04 RX ADMIN — SERTRALINE 150 MG: 100 TABLET, FILM COATED ORAL at 08:20

## 2022-07-04 RX ADMIN — ZOLPIDEM TARTRATE 5 MG: 5 TABLET ORAL at 21:12

## 2022-07-04 RX ADMIN — PANTOPRAZOLE SODIUM 40 MG: 40 TABLET, DELAYED RELEASE ORAL at 05:41

## 2022-07-04 RX ADMIN — OXYCODONE 5 MG: 5 TABLET ORAL at 04:26

## 2022-07-04 RX ADMIN — ASPIRIN 81 MG: 81 TABLET, COATED ORAL at 08:19

## 2022-07-04 RX ADMIN — GABAPENTIN 300 MG: 300 CAPSULE ORAL at 21:12

## 2022-07-04 RX ADMIN — LEVOTHYROXINE SODIUM 150 MCG: 0.15 TABLET ORAL at 05:41

## 2022-07-04 RX ADMIN — HEPARIN SODIUM 3800 UNITS: 1000 INJECTION INTRAVENOUS; SUBCUTANEOUS at 17:58

## 2022-07-04 RX ADMIN — CLONIDINE HYDROCHLORIDE 0.2 MG: 0.1 TABLET ORAL at 08:20

## 2022-07-04 RX ADMIN — ACETAMINOPHEN 500 MG: 500 TABLET, FILM COATED ORAL at 12:21

## 2022-07-04 RX ADMIN — OXYCODONE 5 MG: 5 TABLET ORAL at 17:31

## 2022-07-04 RX ADMIN — OXYCODONE 5 MG: 5 TABLET ORAL at 12:21

## 2022-07-04 RX ADMIN — HYDRALAZINE HYDROCHLORIDE 100 MG: 50 TABLET, FILM COATED ORAL at 05:41

## 2022-07-04 RX ADMIN — Medication 1 TABLET: at 08:19

## 2022-07-04 RX ADMIN — EPOETIN ALFA-EPBX 10000 UNITS: 10000 INJECTION, SOLUTION INTRAVENOUS; SUBCUTANEOUS at 17:58

## 2022-07-04 RX ADMIN — INSULIN LISPRO 3 UNITS: 100 INJECTION, SOLUTION INTRAVENOUS; SUBCUTANEOUS at 11:42

## 2022-07-04 NOTE — PROGRESS NOTES
[facility]     Progress Note    Patient Name: Zaina Martinez  : 1965  MRN: [id number]  Primary Care Physician:  Karson Oreilly MD  Date of admission: 2022    Subjective  Pt is easily awakened but remains lethargic. Pt to receive second session HD today.   Subjective     Chief Complaint:     History of Present Illness  Patient Reports     Review of Systems    Objective  Exam unchanged calves soft and NT. Resp unlabored and regular  Objective     Vitals:   Temp:  [97.6 °F (36.4 °C)-98.1 °F (36.7 °C)] 97.6 °F (36.4 °C)  Heart Rate:  [57-63] 63  Resp:  [14-19] 18  BP: (118-170)/(60-72) 170/72    Physical Exam     Result Review    Result Review:  I have personally reviewed the results from the time of this admission to 2022 11:39 EDT and agree with these findings:  []  Laboratory list / accordion  []  Microbiology  []  Radiology  []  EKG/Telemetry   []  Cardiology/Vascular   []  Pathology  []  Old records  []  Other:  Most notable findings include:       Assessment & Plan  Continue to prepare for dc.   Assessment / Plan     Brief Patient Summary:  Zaina Martinez is a 56 y.o. female who     Active Hospital Problems:  Active Hospital Problems    Diagnosis    • Diabetic muscle infarction (HCC)    • Other insomnia    • Abnormal urinalysis    • Stroke ([facility])    • Chronic diastolic CHF (congestive heart failure) ([facility])    • ESRD (end stage renal disease) ([facility])    • Hypothyroidism    • Hyperlipidemia    • Type 2 diabetes mellitus with kidney complication, with long-term current use of insulin ([facility])      Plan:       DVT prophylaxis:  Medical and mechanical DVT prophylaxis orders are present.    CODE STATUS:    Level Of Support Discussed With: Patient  Code Status (Patient has no pulse and is not breathing): CPR (Attempt to Resuscitate)  Medical Interventions (Patient has pulse or is breathing): Full Support    Disposition:  I expect patient to be discharged .    Dinesh Joyce MD

## 2022-07-04 NOTE — PROGRESS NOTES
Inpatient Rehabilitation Plan of Care Note    Plan of Care  Care Plan Reviewed - No updates at this time.    Safety    Performed Intervention(s)  Fall precautions: bed low and locked, chair alarm and bed alarms in use, nonskid  socks and gait belt in use, call light and personal belongings within reach.  Hourly rounding.      Psychosocial    Performed Intervention(s)  Provide distraction and/or relaxation as needed.  Allow extra time for patient to verbalize needs, concerns, feelings, etc.      Body Systems    Performed Intervention(s)  Dialysis mwf  Monitor weight and I/O.  DM educator consult.  Accuchecks as ordered.      Sphincter Control    Performed Intervention(s)  Monitor I/O.  Bowel meds prn.      Pain    Performed Intervention(s)  Pain meds prn .  Ice to LLE per order    Signed by: Elayne Domínguez RN

## 2022-07-04 NOTE — THERAPY TREATMENT NOTE
Inpatient Rehabilitation - Physical Therapy Treatment Note       [facility]     Patient Name: Zaina Martinez  : 1965  MRN: [id number]    Today's Date: 2022                    Admit Date: 2022      Visit Dx:     ICD-10-CM ICD-9-CM   1. Generalized weakness  R53.1 780.79   2. Diabetic muscle infarction ([facility])  E11.69 250.80    M62.20 728.89   3. Other insomnia  G47.09 780.52       Patient Active Problem List   Diagnosis   • Renal insufficiency   • Hypertensive disorder   • Hypothyroidism   • Type 2 diabetes mellitus with kidney complication, with long-term current use of insulin ([facility])   • Rheumatoid arthritis ([facility])   • Angioedema   • Esophageal dysmotility   • Anemia   • Medically noncompliant   • Myocardial infarction due to demand ischemia ([facility])   • Enteritis   • PRES (posterior reversible encephalopathy syndrome)   • Urine retention   • Klebsiella infection   • Superficial thrombophlebitis   • Generalized weakness   • ESRD (end stage renal disease) ([facility])   • CAD (coronary artery disease)   • Abnormal urinalysis   • Chronic diastolic CHF (congestive heart failure) ([facility])   • Pyelonephritis   • Calculus of gallbladder with acute on chronic cholecystitis without obstruction   • Pleural effusion on right   • Anemia due to chronic kidney disease, on chronic dialysis ([facility])   • Abnormal findings on diagnostic imaging of other specified body structures   • Acute upper respiratory infection   • Agitation   • Alkaline phosphatase raised   • Casts present in urine   • Cellulitis of toe   • Hip pain   • Community acquired pneumonia   • Depressive disorder   • Diarrhea of presumed infectious origin   • Difficult or painful urination   • Disease due to severe acute respiratory syndrome coronavirus 2 (SARS-CoV-2)   • Dyspnea   • Encounter for follow-up examination after completed treatment for conditions other than malignant neoplasm   • H/O: hypothyroidism   • Hyperlipidemia   • Hypomagnesemia   • Intractable  vomiting with nausea   • Leukocytosis   • Luetscher's syndrome   • Need for influenza vaccination   • Restless legs   • Noncompliance with treatment   • Shoulder pain   • Urinary tract infectious disease   • Metabolic encephalopathy   • Abnormal findings on diagnostic imaging of abdomen   • Status post cholecystectomy   • Hyponatremia   • Leukocytosis   • Acute metabolic encephalopathy   • Encephalopathy, toxic   • Acute CVA (cerebrovascular accident) (HCC)   • Intracranial hemorrhage (HCC)   • Stroke (HCC)   • Abnormal urinalysis   • Diabetic muscle infarction (HCC)   • Other insomnia       Past Medical History:   Diagnosis Date   • Acute CVA (cerebrovascular accident) (HCC) 5/1/2022   • Acute on chronic diastolic CHF (congestive heart failure) (HCC)    • CAD (coronary artery disease) 12/20/2021   • Diabetes (HCC)    • Disease of thyroid gland    • GERD (gastroesophageal reflux disease)    • Hyperlipidemia 11/30/2018   • Hypertension    • Rheumatoid arthritis (HCC)        Past Surgical History:   Procedure Laterality Date   • CHOLECYSTECTOMY WITH INTRAOPERATIVE CHOLANGIOGRAM N/A 1/10/2022    Procedure: Laparoscopic cholecystectomy with intraoperative cholangiogram;  Surgeon: Ramana Raygoza MD;  Location: [facility];  Service: General;  Laterality: N/A;   • EYE SURGERY     • HYSTERECTOMY     • INSERTION HEMODIALYSIS CATHETER N/A 12/6/2021    Procedure: HEMODIALYSIS CATHETER INSERTION;  Surgeon: Keli Salazar MD;  Location: [facility] 18/19;  Service: Vascular;  Laterality: N/A;   • INSERTION HEMODIALYSIS CATHETER N/A 5/3/2022    Procedure: TUNNELED CATHETER PLACEMENT;  Surgeon: Keli Salazar MD;  Location: [facility];  Service: Vascular;  Laterality: N/A;   • MUSCLE BIOPSY Left 6/15/2022    Procedure: Left quadriceps muscle biopsy;  Surgeon: Marques Ma MD;  Location: [facility];  Service: Neurosurgery;  Laterality: Left;       PT ASSESSMENT (last 12 hours)     IRF PT  Evaluation and Treatment     Row Name 07/04/22 1115          PT Time and Intention    Document Type daily treatment  -     Mode of Treatment physical therapy  -     Patient/Family/Caregiver Comments/Observations Pt noted that she feels a lot weaker today.  She feels that it is from dialysis last night.    Pt needed max encouragement to participate with PT.  SW had left messages for pt  to come in for teaching today for car transfers and stairs.   did not show .  Pt said that he was caring for grandkids.  -     Row Name 07/04/22 1115          General Information    Patient Profile Reviewed yes  -     General Observations of Patient Pt supine in bed with ice on thigh.  Per nsg, pt just medicated.  -     Existing Precautions/Restrictions fall;other (see comments)  -     Row Name 07/04/22 1115          Pain Assessment    Pretreatment Pain Rating 7/10  -     Posttreatment Pain Rating 7/10  -     Pain Location - Side/Orientation Left  -     Pain Location lower  -     Pain Location - groin  -     Row Name 07/04/22 1115          Cognition/Psychosocial    Affect/Mental Status (Cognition) flat/blunted affect  -     Orientation Status (Cognition) oriented x 3  -     Follows Commands (Cognition) follows one-step commands  -     Personal Safety Interventions fall prevention program maintained;gait belt;nonskid shoes/slippers when out of bed;supervised activity  -     Comment, Cognition Pt focused on the fact that it is not the fact that she cant get into the car to get home that her concern is about her leg strength.  -     Row Name 07/04/22 1115          Bed Mobility    Supine-Sit [place] (Bed Mobility) moderate assist (50% patient effort);maximum assist (25% patient effort);verbal cues  -     Bed Mobility, Safety Issues decreased use of legs for bridging/pushing  -     Assistive Device (Bed Mobility) bed rails  -     Comment, (Bed Mobility) Unable to hold sefl up once  sitting.  -     Row Name 07/04/22 1115          Transfers    Bed-Chair [place] (Transfers) moderate assist (50% patient effort);minimum assist (75% patient effort)  2nd person there for saety.  -     Assistive Device (Bed-Chair Transfers) wheelchair;walker, front-wheeled  -     Sit-Stand [place] (Transfers) moderate assist (50% patient effort);verbal cues;set up  -     Stand-Sit [place] (Transfers) minimum assist (75% patient effort);verbal cues  -     Row Name 07/04/22 1115          Bed-Chair Transfer    Comment, (Bed-Chair Transfer) stand pivot  -     Row Name 07/04/22 1115          Sit-Stand Transfer    Assistive Device (Sit-Stand Transfers) walker, front-wheeled;wheelchair  -     Comment, (Sit-Stand Transfer) Mac VC to intiiate and assist  -     Row Name 07/04/22 1115          Stand-Sit Transfer    Assistive Device (Stand-Sit Transfers) walker, front-wheeled;wheelchair  -     Comment, (Stand-Sit Transfer) dec control on descent.  -     Row Name 07/04/22 1115          Gait/Stairs (Locomotion)    [place] Level (Gait) minimum assist (75% patient effort);verbal cues;1 person to manage equipment  Chair to follow  -     Assistive Device (Gait) walker, front-wheeled  -     Distance in Feet (Gait) 80, 30  -     Pattern (Gait) step-through  -     Deviations/Abnormal Patterns (Gait) gait speed decreased;stride length decreased  -     Bilateral Gait Deviations forward flexed posture;heel strike decreased  Dec foot clearance B  -     Gait Assessment/Intervention Gait slow and labored, pt noting that she just feels weak today and she can't do much  -     Comment, (Gait/Stairs) deferred due to safety concerns.  -     Row Name 07/04/22 1115          Positioning and Restraints    Pre-Treatment Position in bed  -KP     Post Treatment Position wheelchair  -KP     In Wheelchair sitting;call light within reach;encouraged to call for assist;exit alarm on;notified nsg  -            User Key  (r) = Recorded By, (t) = Taken By, (c) = Cosigned By    Initials Name Provider Type     Alyx Whiting, PT Physical Therapist              Wound 06/15/22 1312 Left anterior thigh Incision (Active)   Dressing Appearance open to air 07/04/22 0820   Closure Liquid skin adhesive 07/04/22 0820   Base clean;dry 07/04/22 0820   Periwound dry;intact 07/04/22 0820   Drainage Amount none 07/04/22 0820   Dressing Care open to air 07/04/22 0820     Physical Therapy Education                 Title: PT OT SLP Therapies (In Progress)     Topic: Physical Therapy (In Progress)     Point: Mobility training (In Progress)     Learning Progress Summary           Patient Nonacceptance, E,TB,D, NR by  at 7/2/2022 1535    Comment: Modifactions for getting into higher car with step and review of stairs and current functional level.   Significant Other Refuses, E, NR by  at 7/4/2022 1245    Comment:  did not show for teaching      Show all documentation for this point (32)                 Point: Home exercise program (Resolved)     Learning Progress Summary           Patient Acceptance, E,H, VU by  at 6/29/2022 1058      Show all documentation for this point (11)                 Point: Body mechanics (Resolved)     Learning Progress Summary           Patient Acceptance, E,TB,D, VU,NR by  at 6/25/2022 1434      Show all documentation for this point (8)                 Point: Precautions (Resolved)     Learning Progress Summary           Patient Acceptance, E,TB,D, VU,NR by  at 6/25/2022 1434      Show all documentation for this point (8)                             User Key     Initials Effective Dates Name Provider Type Discipline     06/16/21 -  Fany Lima, PT Physical Therapist PT    LB 03/07/18 -  Liz Rodriguez PTA Physical Therapist Assistant PT     06/16/21 -  Alyx Whiting, PT Physical Therapist PT                PT Recommendation and Plan                          Time Calculation:       PT Charges     Row Name 07/04/22 1245             Time Calculation    Start Time 1100  -      Stop Time 1130  -      Time Calculation (min) 30 min  -      PT Received On 07/04/22  -      PT - Next Appointment 07/05/22  -            User Key  (r) = Recorded By, (t) = Taken By, (c) = Cosigned By    Initials Name Provider Type    Alyx Saini, PT Physical Therapist                Therapy Charges for Today     Code Description Service Date Service Provider Modifiers Qty    07509579975  PT THER PROC EA 15 MIN 7/4/2022 Alyx Whiting, PT GP 1    [id number]  GAIT TRAINING EA 15 MIN 7/4/2022 Alyx Whiting, PT GP 1      Patient was intermittently wearing a face mask during this therapy encounter. Therapist used appropriate personal protective equipment including mask and gloves.  Mask used was standard procedure mask. Appropriate PPE was worn during the entire therapy session. Hand hygiene was completed before and after therapy session. Patient is not in enhanced droplet precautions.         PT G-Codes  AM-PAC 6 Clicks Score (PT): 17      Alyx Whiting PT  7/4/2022

## 2022-07-04 NOTE — THERAPY TREATMENT NOTE
Inpatient Rehabilitation - Occupational Therapy Treatment Note    [facility]     Patient Name: Zaina Martinez  : 1965  MRN: [id number]    Today's Date: 2022                 Admit Date: 2022         ICD-10-CM ICD-9-CM   1. Generalized weakness  R53.1 780.79   2. Diabetic muscle infarction ([facility])  E11.69 250.80    M62.20 728.89   3. Other insomnia  G47.09 780.52       Patient Active Problem List   Diagnosis   • Renal insufficiency   • Hypertensive disorder   • Hypothyroidism   • Type 2 diabetes mellitus with kidney complication, with long-term current use of insulin ([facility])   • Rheumatoid arthritis ([facility])   • Angioedema   • Esophageal dysmotility   • Anemia   • Medically noncompliant   • Myocardial infarction due to demand ischemia ([facility])   • Enteritis   • PRES (posterior reversible encephalopathy syndrome)   • Urine retention   • Klebsiella infection   • Superficial thrombophlebitis   • Generalized weakness   • ESRD (end stage renal disease) ([facility])   • CAD (coronary artery disease)   • Abnormal urinalysis   • Chronic diastolic CHF (congestive heart failure) ([facility])   • Pyelonephritis   • Calculus of gallbladder with acute on chronic cholecystitis without obstruction   • Pleural effusion on right   • Anemia due to chronic kidney disease, on chronic dialysis ([facility])   • Abnormal findings on diagnostic imaging of other specified body structures   • Acute upper respiratory infection   • Agitation   • Alkaline phosphatase raised   • Casts present in urine   • Cellulitis of toe   • Hip pain   • Community acquired pneumonia   • Depressive disorder   • Diarrhea of presumed infectious origin   • Difficult or painful urination   • Disease due to severe acute respiratory syndrome coronavirus 2 (SARS-CoV-2)   • Dyspnea   • Encounter for follow-up examination after completed treatment for conditions other than malignant neoplasm   • H/O: hypothyroidism   • Hyperlipidemia   • Hypomagnesemia   • Intractable vomiting with  nausea   • Leukocytosis   • Luetscher's syndrome   • Need for influenza vaccination   • Restless legs   • Noncompliance with treatment   • Shoulder pain   • Urinary tract infectious disease   • Metabolic encephalopathy   • Abnormal findings on diagnostic imaging of abdomen   • Status post cholecystectomy   • Hyponatremia   • Leukocytosis   • Acute metabolic encephalopathy   • Encephalopathy, toxic   • Acute CVA (cerebrovascular accident) (HCC)   • Intracranial hemorrhage (HCC)   • Stroke (HCC)   • Abnormal urinalysis   • Diabetic muscle infarction (HCC)   • Other insomnia       Past Medical History:   Diagnosis Date   • Acute CVA (cerebrovascular accident) (HCC) 5/1/2022   • Acute on chronic diastolic CHF (congestive heart failure) (HCC)    • CAD (coronary artery disease) 12/20/2021   • Diabetes (HCC)    • Disease of thyroid gland    • GERD (gastroesophageal reflux disease)    • Hyperlipidemia 11/30/2018   • Hypertension    • Rheumatoid arthritis (HCC)        Past Surgical History:   Procedure Laterality Date   • CHOLECYSTECTOMY WITH INTRAOPERATIVE CHOLANGIOGRAM N/A 1/10/2022    Procedure: Laparoscopic cholecystectomy with intraoperative cholangiogram;  Surgeon: Ramana Raygoza MD;  Location: [facility];  Service: General;  Laterality: N/A;   • EYE SURGERY     • HYSTERECTOMY     • INSERTION HEMODIALYSIS CATHETER N/A 12/6/2021    Procedure: HEMODIALYSIS CATHETER INSERTION;  Surgeon: Keli Salazar MD;  Location: [facility] 18/19;  Service: Vascular;  Laterality: N/A;   • INSERTION HEMODIALYSIS CATHETER N/A 5/3/2022    Procedure: TUNNELED CATHETER PLACEMENT;  Surgeon: Keli Salazar MD;  Location: [facility] OR;  Service: Vascular;  Laterality: N/A;   • MUSCLE BIOPSY Left 6/15/2022    Procedure: Left quadriceps muscle biopsy;  Surgeon: Marques Ma MD;  Location: [facility] OR;  Service: Neurosurgery;  Laterality: Left;             IRF OT ASSESSMENT FLOWSHEET (last 12 hours)     IRF OT  Evaluation and Treatment     Row Name 07/04/22 1250          OT Time and Intention    Document Type daily treatment  -SG     Mode of Treatment individual therapy;occupational therapy  -SG     Patient Effort good  -SG     Symptoms Noted During/After Treatment none  -     Row Name 07/04/22 1250          General Information    Patient/Family/Caregiver Comments/Observations Pt supine in bed, increased pain today  -     Row Name 07/04/22 1250          Pain Assessment    Pretreatment Pain Rating 7/10  -SG     Posttreatment Pain Rating 7/10  -SG     Pain Location - Side/Orientation Left  -     Pain Location - groin  -     Row Name 07/04/22 1250          Cognition/Psychosocial    Orientation Status (Cognition) oriented x 3  -SG     Follows Commands (Cognition) follows one-step commands  -     Personal Safety Interventions gait belt;fall prevention program maintained  -     Row Name 07/04/22 1250          Bed Mobility    Rolling Left [place] (Bed Mobility) minimum assist (75% patient effort)  -SG     Rolling Right [place] (Bed Mobility) minimum assist (75% patient effort)  -     Row Name 07/04/22 1250          Shoulder (Therapeutic Exercise)    Shoulder Strengthening (Therapeutic Exercise) bilateral;flexion;extension;scapular stabilization;supine;2 lb free weight;10 repetitions;3 sets  -     Row Name 07/04/22 1250          Elbow/Forearm (Therapeutic Exercise)    Elbow/Forearm Strengthening (Therapeutic Exercise) bilateral;flexion;extension;supination;pronation;2 lb free weight;10 repetitions;3 sets  -     Row Name 07/04/22 1250          Positioning and Restraints    Pre-Treatment Position in bed  -SG     Post Treatment Position bed  -SG     In Bed supine;call light within reach;encouraged to call for assist;exit alarm on  -SG           User Key  (r) = Recorded By, (t) = Taken By, (c) = Cosigned By    Initials Name Effective Dates    SG Viola Wren, OTR 06/16/21 -                  Occupational  Therapy Education                 Title: PT OT SLP Therapies (In Progress)     Topic: Occupational Therapy (Resolved)     Point: ADL training (Resolved)     Description:   Instruct learner(s) on proper safety adaptation and remediation techniques during self care or transfers.   Instruct in proper use of assistive devices.              Learning Progress Summary           Patient Acceptance, E,TB,D, VU,NR by  at 6/25/2022 1434      Show all documentation for this point (2)                 Point: Home exercise program (Resolved)     Description:   Instruct learner(s) on appropriate technique for monitoring, assisting and/or progressing therapeutic exercises/activities.              Learning Progress Summary           Patient Acceptance, E,D,H, VU by  at 6/30/2022 1544    Comment: review of HEP with hand weights and theraband      Show all documentation for this point (2)                 Point: Precautions (Resolved)     Description:   Instruct learner(s) on prescribed precautions during self-care and functional transfers.              Learning Progress Summary           Patient Acceptance, E,TB,D, VU,NR by  at 6/25/2022 1434      Show all documentation for this point (2)                 Point: Body mechanics (Resolved)     Description:   Instruct learner(s) on proper positioning and spine alignment during self-care, functional mobility activities and/or exercises.              Learning Progress Summary           Patient Acceptance, E,TB,D, VU,NR by  at 6/25/2022 1434      Show all documentation for this point (2)                             User Key     Initials Effective Dates Name Provider Type Discipline     06/16/21 -  Fany Lima PT Physical Therapist PT    AF 06/16/21 -  Tasha Helm OTR Occupational Therapist OT                    OT Recommendation and Plan                         Time Calculation:      Time Calculation- OT     Row Name 07/04/22 1313             Time Calculation- OT    OT Start  Time 1245  -      OT Stop Time 1315  -      OT Time Calculation (min) 30 min  -      OT Received On 07/04/22  -            User Key  (r) = Recorded By, (t) = Taken By, (c) = Cosigned By    Initials Name Provider Type    Viola Delacruz OTR Occupational Therapist              Therapy Charges for Today     Code Description Service Date Service Provider Modifiers Qty    [id number] HC OT THER PROC EA 15 MIN 7/4/2022 Viola Wren OTR GO 2                   RAMO Skelton  7/4/2022

## 2022-07-04 NOTE — PLAN OF CARE
Goal Outcome Evaluation:     Slept well. Ambien given for sleep. Had dialysis this evening. Meds with water. On fluid restrictions. Refused 22:00 Hydralazine. B/P 122/60 & 127/69. Glucose 99 this evening, & 143 @ 02:21.  Oxycodone given for Lt. Thigh pain, with some relief. Bladder scanned for 404 @ 0300, but denies bladder pain or pressure, declined In/out cath. For now.

## 2022-07-04 NOTE — PROGRESS NOTES
"DAILY PROGRESS NOTE  [facility]    Patient Identification:  Name: Zaina Martinez  Age: 56 y.o.  Sex: female  :  1965  MRN: [id number]         Primary Care Physician: Karson Oreilly MD    Subjective:  Interval History:She complains of leg pain.  Getting dialysis.    Objective:    Scheduled Meds:aspirin, 81 mg, Oral, Daily  b complex-vitamin c-folic acid, 1 tablet, Oral, Daily  carvedilol, 25 mg, Oral, BID With Meals  cloNIDine, 0.2 mg, Oral, Q12H  epoetin brooke/brooke-epbx, 10,000 Units, Intravenous, Once per day on   gabapentin, 300 mg, Oral, BID  hydrALAZINE, 100 mg, Oral, Q8H  insulin glargine, 7 Units, Subcutaneous, QAM  insulin lispro, 0-7 Units, Subcutaneous, TID AC  levothyroxine, 150 mcg, Oral, Q AM  lidocaine, 1 patch, Transdermal, Q24H  losartan, 100 mg, Oral, Q24H  miconazole, 1 application, Topical, Q12H  pantoprazole, 40 mg, Oral, Q AM  rOPINIRole, 0.75 mg, Oral, Nightly  sertraline, 150 mg, Oral, Daily  tamsulosin, 0.4 mg, Oral, Daily      Continuous Infusions:     Vital signs in last 24 hours:  Temp:  [97.6 °F (36.4 °C)-98.5 °F (36.9 °C)] 98.5 °F (36.9 °C)  Heart Rate:  [57-63] 60  Resp:  [16-19] 16  BP: (122-170)/(60-78) 136/78    Intake/Output:    Intake/Output Summary (Last 24 hours) at 2022 1448  Last data filed at 2022 1211  Gross per 24 hour   Intake 710 ml   Output 450 ml   Net 260 ml       Exam:  /78 (BP Location: Right arm, Patient Position: Sitting)   Pulse 60   Temp 98.5 °F (36.9 °C) (Oral)   Resp 16   Ht 157.5 cm (62.01\")   Wt 73.9 kg (163 lb)   SpO2 97%   BMI 29.81 kg/m²     General Appearance:    Alert, cooperative, no distress   Head:    Normocephalic, without obvious abnormality, atraumatic   Eyes:       Throat:   Lips, tongue, gums normal   Neck:   Supple, symmetrical, trachea midline, no JVD   Lungs:     Clear to auscultation bilaterally, respirations unlabored   Chest Wall:    No tenderness or deformity    Heart:    Regular rate " and rhythm, S1 and S2 normal, no murmur,no  Rub or gallop   Abdomen:     Soft, nontender, bowel sounds active, no masses, no organomegaly    Extremities:   Extremities normal, atraumatic, no cyanosis or edema   Pulses:      Skin:   Skin is warm and dry,  no rashes or palpable lesions   Neurologic:   no focal deficits noted      Lab Results (last 72 hours)     Procedure Component Value Units Date/Time    POC Glucose Once [[id number]]  (Normal) Collected: 07/02/22 1719    Specimen: Blood Updated: 07/02/22 1721     Glucose 102 mg/dL      Comment: Meter: [id number] : [id number] Smooth Miranda RN       POC Glucose Once [[id number]]  (Abnormal) Collected: 07/02/22 1631    Specimen: Blood Updated: 07/02/22 1635     Glucose 41 mg/dL      Comment: RN Notified R and V Meter: [id number] : [id number] Smooth Miranda RN       POC Glucose Once [[id number]]  (Normal) Collected: 07/02/22 1151    Specimen: Blood Updated: 07/02/22 1152     Glucose 97 mg/dL      Comment: Meter: [id number] : [id number] Randolph SAAVEDRA       POC Glucose Once [[id number]]  (Normal) Collected: 07/02/22 1024    Specimen: Blood Updated: 07/02/22 1025     Glucose 120 mg/dL      Comment: Meter: [id number] : [id number] Smooth Miranda RN       Basic Metabolic Panel [[id number]]  (Abnormal) Collected: 07/02/22 0828    Specimen: Blood Updated: 07/02/22 0933     Glucose 65 mg/dL      BUN 40 mg/dL      Creatinine 2.50 mg/dL      Sodium 135 mmol/L      Potassium 5.3 mmol/L      Chloride 98 mmol/L      CO2 24.0 mmol/L      Calcium 7.6 mg/dL      BUN/Creatinine Ratio 16.0     Anion Gap 13.0 mmol/L      eGFR 22.1 mL/min/1.73      Comment: National Kidney Foundation and American Society of Nephrology (ASN) Task Force recommended calculation based on the Chronic Kidney Disease Epidemiology Collaboration (CKD-EPI) equation refit without adjustment for race.       Narrative:      GFR Normal >60  Chronic Kidney Disease <60  Kidney Failure <15      Hepatic Function  Panel [[id number]]  (Abnormal) Collected: 07/02/22 0828    Specimen: Blood Updated: 07/02/22 0931     Total Protein 5.5 g/dL      Albumin 2.80 g/dL      ALT (SGPT) 14 U/L      AST (SGOT) 28 U/L      Alkaline Phosphatase 380 U/L      Total Bilirubin 0.4 mg/dL      Bilirubin, Direct <0.2 mg/dL      Bilirubin, Indirect --     Comment: Unable to calculate       Phosphorus [[id number]]  (Abnormal) Collected: 07/02/22 0828    Specimen: Blood Updated: 07/02/22 0931     Phosphorus 4.6 mg/dL     CBC & Differential [[id number]]  (Abnormal) Collected: 07/02/22 0828    Specimen: Blood Updated: 07/02/22 0904    Narrative:      The following orders were created for panel order CBC & Differential.  Procedure                               Abnormality         Status                     ---------                               -----------         ------                     CBC Auto Differential[[id number]]        Abnormal            Final result                 Please view results for these tests on the individual orders.    CBC Auto Differential [[id number]]  (Abnormal) Collected: 07/02/22 0828    Specimen: Blood Updated: 07/02/22 0904     WBC 21.35 10*3/mm3      RBC 3.21 10*6/mm3      Hemoglobin 8.4 g/dL      Hematocrit 26.8 %      MCV 83.5 fL      MCH 26.2 pg      MCHC 31.3 g/dL      RDW 18.9 %      RDW-SD 56.6 fl      MPV 10.0 fL      Platelets 234 10*3/mm3      Neutrophil % 81.5 %      Lymphocyte % 10.6 %      Monocyte % 6.7 %      Eosinophil % 0.1 %      Basophil % 0.1 %      Immature Grans % 1.0 %      Neutrophils, Absolute 17.40 10*3/mm3      Lymphocytes, Absolute 2.27 10*3/mm3      Monocytes, Absolute 1.42 10*3/mm3      Eosinophils, Absolute 0.02 10*3/mm3      Basophils, Absolute 0.02 10*3/mm3      Immature Grans, Absolute 0.22 10*3/mm3      nRBC 0.0 /100 WBC     POC Glucose Once [[id number]]  (Normal) Collected: 07/02/22 0614    Specimen: Blood Updated: 07/02/22 0615     Glucose 92 mg/dL      Comment: Meter: [id number] :  [id number] [facility]       POC Glucose Once [[id number]]  (Abnormal) Collected: 07/02/22 0253    Specimen: Blood Updated: 07/02/22 0254     Glucose 140 mg/dL      Comment: Meter: [id number] : [id number] Kushal Choe RN       POC Glucose Once [[id number]]  (Abnormal) Collected: 07/01/22 2036    Specimen: Blood Updated: 07/01/22 2037     Glucose 139 mg/dL      Comment: Meter: [id number] : [id number] [facility]       Hepatic Function Panel [[id number]]  (Abnormal) Collected: 07/01/22 1827    Specimen: Blood Updated: 07/01/22 1926     Total Protein 6.0 g/dL      Albumin 3.20 g/dL      ALT (SGPT) 18 U/L      AST (SGOT) 33 U/L      Alkaline Phosphatase 450 U/L      Total Bilirubin 0.5 mg/dL      Bilirubin, Direct 0.2 mg/dL      Bilirubin, Indirect 0.3 mg/dL     Basic Metabolic Panel [[id number]]  (Abnormal) Collected: 07/01/22 1827    Specimen: Blood Updated: 07/01/22 1926     Glucose 75 mg/dL      BUN 34 mg/dL      Creatinine 2.21 mg/dL      Sodium 134 mmol/L      Potassium 5.4 mmol/L      Chloride 97 mmol/L      CO2 24.0 mmol/L      Calcium 7.9 mg/dL      BUN/Creatinine Ratio 15.4     Anion Gap 13.0 mmol/L      eGFR 25.6 mL/min/1.73      Comment: National Kidney Foundation and American Society of Nephrology (ASN) Task Force recommended calculation based on the Chronic Kidney Disease Epidemiology Collaboration (CKD-EPI) equation refit without adjustment for race.       Narrative:      GFR Normal >60  Chronic Kidney Disease <60  Kidney Failure <15      Phosphorus [[id number]]  (Normal) Collected: 07/01/22 1827    Specimen: Blood Updated: 07/01/22 1926     Phosphorus 3.2 mg/dL     CBC & Differential [[id number]]  (Abnormal) Collected: 07/01/22 1827    Specimen: Blood Updated: 07/01/22 1909    Narrative:      The following orders were created for panel order CBC & Differential.  Procedure                               Abnormality         Status                     ---------                                -----------         ------                     CBC Auto Differential[[id number]]        Abnormal            Final result                 Please view results for these tests on the individual orders.    CBC Auto Differential [[id number]]  (Abnormal) Collected: 07/01/22 1827    Specimen: Blood Updated: 07/01/22 1909     WBC 24.21 10*3/mm3      RBC 3.60 10*6/mm3      Hemoglobin 9.4 g/dL      Hematocrit 30.0 %      MCV 83.3 fL      MCH 26.1 pg      MCHC 31.3 g/dL      RDW 19.0 %      RDW-SD 56.4 fl      MPV 10.3 fL      Platelets 288 10*3/mm3      Neutrophil % 94.6 %      Lymphocyte % 2.7 %      Monocyte % 1.4 %      Eosinophil % 0.0 %      Basophil % 0.1 %      Immature Grans % 1.2 %      Neutrophils, Absolute 22.89 10*3/mm3      Lymphocytes, Absolute 0.65 10*3/mm3      Monocytes, Absolute 0.34 10*3/mm3      Eosinophils, Absolute 0.00 10*3/mm3      Basophils, Absolute 0.03 10*3/mm3      Immature Grans, Absolute 0.30 10*3/mm3      nRBC 0.0 /100 WBC     POC Glucose Once [[id number]]  (Abnormal) Collected: 07/01/22 1606    Specimen: Blood Updated: 07/01/22 1607     Glucose 287 mg/dL      Comment: Meter: [id number] : [id number] Dierken Jessica L RN       POC Glucose Once [[id number]]  (Normal) Collected: 07/01/22 1246    Specimen: Blood Updated: 07/01/22 1247     Glucose 126 mg/dL      Comment: Meter: [id number] : [id number] Dierken Jessica L RN       POC Glucose Once [[id number]]  (Normal) Collected: 07/01/22 0639    Specimen: Blood Updated: 07/01/22 0641     Glucose 95 mg/dL      Comment: Meter: [id number] : [id number] [employer] CNA       POC Glucose Once [[id number]]  (Abnormal) Collected: 07/01/22 0208    Specimen: Blood Updated: 07/01/22 0210     Glucose 186 mg/dL      Comment: Meter: [id number] : [id number] [employer] CNA       POC Glucose Once [[id number]]  (Abnormal) Collected: 06/30/22 2032    Specimen: Blood Updated: 06/30/22 2034     Glucose 251 mg/dL      Comment: Meter: [id number] : [id number]  Jennie Gilliam CNA       POC Glucose Once [[id number]]  (Abnormal) Collected: 06/30/22 1615    Specimen: Blood Updated: 06/30/22 1616     Glucose 197 mg/dL      Comment: Meter: [id number] : [id number] Makprakashadjason Chuuza NA       POC Glucose Once [[id number]]  (Normal) Collected: 06/30/22 1149    Specimen: Blood Updated: 06/30/22 1201     Glucose 100 mg/dL      Comment: Meter: [id number] : [id number] Makharadze Firuza NA       POC Glucose Once [[id number]]  (Abnormal) Collected: 06/30/22 1115    Specimen: Blood Updated: 06/30/22 1117     Glucose 68 mg/dL      Comment: Meter: [id number] : [id number] Makharadze Firuza NA       POC Glucose Once [[id number]]  (Abnormal) Collected: 06/30/22 1100    Specimen: Blood Updated: 06/30/22 1102     Glucose 55 mg/dL      Comment: Meter: [id number] : [id number] Makharadze Firuza NA       POC Glucose Once [[id number]]  (Abnormal) Collected: 06/30/22 1045    Specimen: Blood Updated: 06/30/22 1045     Glucose 55 mg/dL      Comment: Meter: [id number] : [id number] Edson MCELROY RN       Basic Metabolic Panel [[id number]]  (Abnormal) Collected: 06/30/22 0912    Specimen: Blood Updated: 06/30/22 1041     Glucose 91 mg/dL      BUN 43 mg/dL      Creatinine 2.61 mg/dL      Sodium 134 mmol/L      Potassium 5.3 mmol/L      Chloride 100 mmol/L      CO2 23.0 mmol/L      Calcium 7.6 mg/dL      BUN/Creatinine Ratio 16.5     Anion Gap 11.0 mmol/L      eGFR 21.0 mL/min/1.73      Comment: National Kidney Foundation and American Society of Nephrology (ASN) Task Force recommended calculation based on the Chronic Kidney Disease Epidemiology Collaboration (CKD-EPI) equation refit without adjustment for race.       Narrative:      GFR Normal >60  Chronic Kidney Disease <60  Kidney Failure <15      POC Glucose Once [[id number]]  (Abnormal) Collected: 06/30/22 1034    Specimen: Blood Updated: 06/30/22 1040     Glucose 43 mg/dL      Comment: Result Not Confirmed Meter: [id number] :  [id number] Edson MCELROY AMY       Hepatic Function Panel [[id number]]  (Abnormal) Collected: 06/30/22 0912    Specimen: Blood Updated: 06/30/22 1031     Total Protein 5.9 g/dL      Albumin 2.60 g/dL      ALT (SGPT) 15 U/L      AST (SGOT) 30 U/L      Alkaline Phosphatase 426 U/L      Total Bilirubin 0.4 mg/dL      Bilirubin, Direct 0.3 mg/dL      Bilirubin, Indirect 0.1 mg/dL     Phosphorus [[id number]]  (Normal) Collected: 06/30/22 0912    Specimen: Blood Updated: 06/30/22 1031     Phosphorus 4.0 mg/dL     CBC & Differential [[id number]]  (Abnormal) Collected: 06/30/22 0912    Specimen: Blood Updated: 06/30/22 1010    Narrative:      The following orders were created for panel order CBC & Differential.  Procedure                               Abnormality         Status                     ---------                               -----------         ------                     CBC Auto Differential[[id number]]        Abnormal            Final result                 Please view results for these tests on the individual orders.    CBC Auto Differential [[id number]]  (Abnormal) Collected: 06/30/22 0912    Specimen: Blood Updated: 06/30/22 1010     WBC 19.91 10*3/mm3      RBC 3.25 10*6/mm3      Hemoglobin 8.5 g/dL      Hematocrit 27.2 %      MCV 83.7 fL      MCH 26.2 pg      MCHC 31.3 g/dL      RDW 18.1 %      RDW-SD 53.9 fl      MPV 10.0 fL      Platelets 315 10*3/mm3      Neutrophil % 81.8 %      Lymphocyte % 11.2 %      Monocyte % 5.4 %      Eosinophil % 0.2 %      Basophil % 0.1 %      Immature Grans % 1.3 %      Neutrophils, Absolute 16.30 10*3/mm3      Lymphocytes, Absolute 2.23 10*3/mm3      Monocytes, Absolute 1.07 10*3/mm3      Eosinophils, Absolute 0.03 10*3/mm3      Basophils, Absolute 0.02 10*3/mm3      Immature Grans, Absolute 0.26 10*3/mm3      nRBC 0.1 /100 WBC     POC Glucose Once [[id number]]  (Normal) Collected: 06/30/22 0631    Specimen: Blood Updated: 06/30/22 0633     Glucose 117 mg/dL      Comment: Meter:  [id number] : [id number] Smyzer Inge NA       POC Glucose Once [[id number]]  (Abnormal) Collected: 06/30/22 0310    Specimen: Blood Updated: 06/30/22 0311     Glucose 181 mg/dL      Comment: Meter: [id number] : [id number] Smyzer Inge NA       POC Glucose Once [[id number]]  (Abnormal) Collected: 06/29/22 2046    Specimen: Blood Updated: 06/29/22 2048     Glucose 219 mg/dL      Comment: Meter: [id number] : [id number] Smyzer Inge NA       POC Glucose Once [[id number]]  (Normal) Collected: 06/29/22 1901    Specimen: Blood Updated: 06/29/22 1902     Glucose 101 mg/dL      Comment: Meter: [id number] : [id number] Cricket Harley NA       POC Glucose Once [[id number]]  (Abnormal) Collected: 06/29/22 1842    Specimen: Blood Updated: 06/29/22 1844     Glucose 56 mg/dL      Comment: Meter: [id number] : [id number] Anup SAAVEDRA           Data Review:  Results from last 7 days   Lab Units 07/04/22  0704 07/03/22  1219 07/03/22  0927   SODIUM mmol/L 134* 131* 135*   POTASSIUM mmol/L 6.1* 7.3* 6.9*   CHLORIDE mmol/L 99 97* 99   CO2 mmol/L 25.0 23.9 24.0   BUN mg/dL 33* 57* 56*   CREATININE mg/dL 2.33* 3.53* 3.48*   GLUCOSE mg/dL 138* 117* 165*   CALCIUM mg/dL 7.4* 7.3* 7.5*     Results from last 7 days   Lab Units 07/04/22  0704 07/03/22  0927 07/02/22  0828   WBC 10*3/mm3 17.66* 23.31* 21.35*   HEMOGLOBIN g/dL 8.0* 8.3* 8.4*   HEMATOCRIT % 24.4* 27.0* 26.8*   PLATELETS 10*3/mm3 182 206 234             Lab Results   Lab Value Date/Time    TROPONINT 0.092 (C) 12/19/2021 1314    TROPONINT 0.117 (C) 11/30/2021 0153    TROPONINT 0.132 (C) 11/29/2021 2121         Results from last 7 days   Lab Units 07/04/22  0704 07/03/22  0927 07/02/22  0828   ALK PHOS U/L 390* 436* 380*   BILIRUBIN mg/dL 0.4 0.5 0.4   BILIRUBIN DIRECT mg/dL 0.2 0.3 <0.2   ALT (SGPT) U/L 27 36* 14   AST (SGOT) U/L 41* 64* 28             Glucose   Date/Time Value Ref Range Status   07/04/2022 1102 207 (H) 70 - 130 mg/dL Final      Comment:     Meter: [id number] : [id number] Otilia Blanca NA   07/04/2022 0642 140 (H) 70 - 130 mg/dL Final     Comment:     Meter: [id number] : [id number] León Young    07/04/2022 0221 143 (H) 70 - 130 mg/dL Final     Comment:     Meter: [id number] : [id number] León Young NA   07/03/2022 2045 99 70 - 130 mg/dL Final     Comment:     Meter: [id number] : [id number] León Young    07/03/2022 1622 63 (L) 70 - 130 mg/dL Final     Comment:     Meter: [id number] : [id number] Zoltan Malik NA   07/03/2022 1125 142 (H) 70 - 130 mg/dL Final     Comment:     Meter: [id number] : [id number] Zoltan Malik NA   07/03/2022 0624 126 70 - 130 mg/dL Final     Comment:     Meter: [id number] : [id number] Jennie Gilliam A   07/03/2022 0247 78 70 - 130 mg/dL Final     Comment:     Meter: [id number] : [id number] Kushal Choe RN           Past Medical History:   Diagnosis Date   • Acute CVA (cerebrovascular accident) ([facility]) 5/1/2022   • Acute on chronic diastolic CHF (congestive heart failure) ([facility])    • CAD (coronary artery disease) 12/20/2021   • Diabetes ([facility])    • Disease of thyroid gland    • GERD (gastroesophageal reflux disease)    • Hyperlipidemia 11/30/2018   • Hypertension    • Rheumatoid arthritis (HCC)        Assessment:  Active Hospital Problems    Diagnosis  POA   • 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       Plan:  Insulin was decreased for hypoglycemia.  Continue same and  monitor.  The trend is better.    Aris Isbell MD  7/4/2022  14:48 EDT

## 2022-07-04 NOTE — PROGRESS NOTES
Inpatient Rehabilitation Plan of Care Note    Plan of Care  Care Plan Reviewed - Updates as Follows    Body Systems    [RN] Genitourinary(Active)  Current Status(06/30/2022): Patient is a M,W,F HD patient with a tunneled  catheter to the R chest. Pt produces little urine.  pt oliguric. New cath order  6/20: straight cath daily and prn until cath residual < 300 ml x3 days.  Weekly Goal(07/12/2022): Pt will plan to transition to community dialysis.  Discharge Goal: Pt will transition to community dialysis.    [RN] Endocrine(Active)  Current Status(06/30/2022): Patient is a diabetic.  Weekly Goal(07/12/2022): Blood glucose will be controlled.  Discharge Goal: Patient will have medication regimen that she can manage at  home.    Performed Intervention(s)  Dialysis mwf  Monitor weight and I/O.  DM educator consult.  Accuchecks as ordered.      Pain    [RN] Pain Management(Active)  Current Status(06/30/2022): Pt has consistent pain to L groin/L medial upper  thigh . Muscle biopsy pending.  Weekly Goal(07/12/2022): Pain will be controlled.  Discharge Goal: Pt will have pain management regimen that she can follow at  home.    Performed Intervention(s)  Pain meds prn .  Ice to LLE per order      Psychosocial    [RN] Coping/Adjustment(Active)  Current Status(06/30/2022): Pt is A/Ox4. Pt is at increased risk for reduced  coping r/t hospitalization and comorbidities. Pt has been calling for assistance  appropriately as needed. Pt has supportive family.  Weekly Goal(07/12/2022): Pt will verbalize needs, feelings, concerns to staff as  needed.  Discharge Goal: Pt will verbalize adequate coping strategies to use at home.    Performed Intervention(s)  Provide distraction and/or relaxation as needed.  Allow extra time for patient to verbalize needs, concerns, feelings, etc.      Safety    [RN] Potential for Injury(Active)  Current Status(06/30/2022): Patient is at increased risk for falls/injury r/t  hospitalization and generalized  weakness.  Weekly Goal(07/12/2022): Patient will call appropriately for assistance as  needed.  Discharge Goal: Patient will remain free from falls/injury.    Performed Intervention(s)  Fall precautions: bed low and locked, chair alarm and bed alarms in use, nonskid  socks and gait belt in use, call light and personal belongings within reach.  Hourly rounding.      Sphincter Control    [RN] Bowel and bladder (Active)  Current Status(06/30/2022): Patient can be continent and is often incontinent of  stool. Patient is oliguric and is an HD patient. New cath order 6/20.  Weekly Goal(07/12/2022): Pt will be continent 75% of the time. Patient will have  BM at least q3 days or within normal patterns.  Discharge Goal: Patient will be continent 100% of the time.    Performed Intervention(s)  Monitor I/O.  Bowel meds prn.    Signed by: Ruth Rebollar RN

## 2022-07-04 NOTE — PROGRESS NOTES
Inpatient Rehabilitation Functional Measures Assessment and Plan of Care    Plan of Care  Updated Problems/Interventions  Mobility    [OT] Toilet Transfers(Active)  Current Status(07/04/2022): min A  Weekly Goal(07/05/2022): CGA  Discharge Goal: CGA    [OT] Tub/Shower Transfers(Active)  Current Status(07/04/2022): min A  Weekly Goal(07/05/2022): CGA  Discharge Goal: CGA        Self Care    [OT] Bathing(Active)  Current Status(07/04/2022): UB-SBA, LB-min A with LLE  Weekly Goal(07/05/2022): CGA  Discharge Goal: CGA    [OT] Dressing (Lower)(Active)  Current Status(07/04/2022): Mod A with LLE  Weekly Goal(07/05/2022): MIN  Discharge Goal: CGA    [OT] Dressing (Upper)(Active)  Current Status(07/04/2022): set up  Weekly Goal(07/05/2022): SBA  Discharge Goal: SBA    [OT] Grooming(Active)  Current Status(07/04/2022): set up  Weekly Goal(07/05/2022): SBA  Discharge Goal: SBA    [OT] Toileting(Active)  Current Status(07/04/2022): min A  Weekly Goal(07/05/2022): CGA  Discharge Goal: CGA    Functional Measures  JEREMI Eating:  Branch  JEREMI Grooming: Branch  JEREMI Bathing:  Branch  JEREMI Upper Body Dressing:  Branch  JEREMI Lower Body Dressing:  Branch  JEREMI Toileting:  Branch    JEREMI Bladder Management  Level of Assistance:  Branch  Frequency/Number of Accidents this Shift:  Branch    JEREMI Bowel Management  Level of Assistance: Branch  Frequency/Number of Accidents this Shift: Branch    JEREMI Bed/Chair/Wheelchair Transfer:  Branch  JEREMI Toilet Transfer:  Branch  JEREMI Tub/Shower Transfer:  Branch    Previously Documented Mode of Locomotion at Discharge: Field  JEREMI Expected Mode of Locomotion at Discharge: Branch  JEREMI Walk/Wheelchair:  Branch  JEREMI Stairs:  Branch    JEREMI Comprehension:  Branch  JEREMI Expression:  Branch  JEREMI Social Interaction:  Branch  JEREMI Problem Solving:  Branch  JEREMI Memory:  Branch    Therapy Mode Minutes  Occupational Therapy: Branch  Physical Therapy: Branch  Speech Language Pathology:  Branch    Signed by: Tasha Helm OT

## 2022-07-04 NOTE — PROGRESS NOTES
[facility] Progress Note      Patient Name: Zaina Martinez  : 1965  MRN: [id number]  Primary Care Physician:  Karson Oreilly MD  Date of admission: 2022    Subjective     Interval History:   Follow up ESRD  Patient was seen today on dialysis.  No shortness of air or chest pain      Review of Systems:   As noted above    Objective     Vitals:   Temp:  [97.6 °F (36.4 °C)-98.1 °F (36.7 °C)] 97.6 °F (36.4 °C)  Heart Rate:  [57-63] 63  Resp:  [14-19] 18  BP: (118-170)/(60-72) 170/72    Intake/Output Summary (Last 24 hours) at 2022 1152  Last data filed at 2022 0800  Gross per 24 hour   Intake 460 ml   Output --   Net 460 ml       Physical Exam:   General: ALert NAD chronically ill  HEENT: AT/NC; periorbital edema noted  Neck: RIJ TDC  Lungs: clear to auscultation  Heart: RRR no s3 or rub. 2/6 systolic murmur  Abdomen: +bs, soft, not distended  Extremities: 1+ pitting edema bilateral extremities.  Neuro:  Moves all 4 ext.     Scheduled Meds:     aspirin, 81 mg, Oral, Daily  b complex-vitamin c-folic acid, 1 tablet, Oral, Daily  carvedilol, 25 mg, Oral, BID With Meals  cloNIDine, 0.2 mg, Oral, Q12H  epoetin brooke/brooke-epbx, 10,000 Units, Intravenous, Once per day on   gabapentin, 300 mg, Oral, BID  hydrALAZINE, 100 mg, Oral, Q8H  insulin glargine, 7 Units, Subcutaneous, QAM  insulin lispro, 0-7 Units, Subcutaneous, TID AC  levothyroxine, 150 mcg, Oral, Q AM  lidocaine, 1 patch, Transdermal, Q24H  losartan, 100 mg, Oral, Q24H  pantoprazole, 40 mg, Oral, Q AM  rOPINIRole, 0.75 mg, Oral, Nightly  sertraline, 150 mg, Oral, Daily  tamsulosin, 0.4 mg, Oral, Daily      IV Meds:        Results Reviewed:   I have personally reviewed the results from the time of this admission to 2022 11:52 EDT     Results from last 7 days   Lab Units 22  0704 22  1219 22  0927 22  0828   SODIUM mmol/L 134* 131* 135* 135*   POTASSIUM mmol/L 6.1* 7.3* 6.9* 5.3*    CHLORIDE mmol/L 99 97* 99 98   CO2 mmol/L 25.0 23.9 24.0 24.0   BUN mg/dL 33* 57* 56* 40*   CREATININE mg/dL 2.33* 3.53* 3.48* 2.50*   CALCIUM mg/dL 7.4* 7.3* 7.5* 7.6*   BILIRUBIN mg/dL 0.4  --  0.5 0.4   ALK PHOS U/L 390*  --  436* 380*   ALT (SGPT) U/L 27  --  36* 14   AST (SGOT) U/L 41*  --  64* 28   GLUCOSE mg/dL 138* 117* 165* 65       Estimated Creatinine Clearance: 25.4 mL/min (A) (by C-G formula based on SCr of 2.33 mg/dL (H)).    Results from last 7 days   Lab Units 07/04/22  0704 07/03/22  0927 07/02/22  0828   PHOSPHORUS mg/dL 5.3* 5.8* 4.6*             Results from last 7 days   Lab Units 07/04/22  0704 07/03/22  0927 07/02/22  0828 07/01/22  1827 06/30/22  0912   WBC 10*3/mm3 17.66* 23.31* 21.35* 24.21* 19.91*   HEMOGLOBIN g/dL 8.0* 8.3* 8.4* 9.4* 8.5*   PLATELETS 10*3/mm3 182 206 234 288 315             Assessment / Plan     ASSESSMENT:    1.  ESRD, secondary to diabetic and hypertensive glomerulosclerosis; usual HD schedule is MW.  Her volume status is generous.  2.  Intracerebral bleed and altered mental status  3.  Infected TDC, s/p removal 5/1. Replaced. Concluded vancomycin with last dose given 5/26  4.  DM2    5.  Coronary artery disease  6.  Acute on chronic diastolic dysfunction  7.  Anemia of CKD, on long-acting ANDRAE as an outpatient. Trying to minimize transfusions unless Hgb less than 7. Iron panel in favor iron deficiency anemia.  8.  Hyponatremia.  Improved on dialysis  9. Hyperkalemia    PLAN:    1.  Plan for another dialysis today potassium came back high again.  Renal diet and avoid all potassium supplement.  2. Surveillance labs.          Ruthann Palmer MD  07/04/22  11:52 EDT    [facility]  [phone]

## 2022-07-04 NOTE — PROGRESS NOTES
Inpatient Rehabilitation Plan of Care Note    Plan of Care  Updated Problems/Interventions  Mobility    [PT] Bed/Chair/Wheelchair(Active)  Current Status(07/04/2022):  Ranulfo . Up to min/ModA  Weekly Goal(07/05/2022): CGA  Discharge Goal: CGA    [PT] Bed Mobility(Active)  Current Status(07/04/2022): sit to supine Ranulfo. Supine to sit SBA  Weekly Goal(07/05/2022): SBA  Discharge Goal: SBA    [PT] Car Transfers(Active)  Current Status(07/04/2022): CGA, Rwx in gym, Min with modified to higher seat,  rwx  Weekly Goal(07/05/2022): PT only  Discharge Goal: Min    [PT] Walk(Active)  Current Status(07/04/2022):  160', rwx, CGA/SBA  Weekly Goal(07/05/2022): to BR, Rwx , CGA/SBA  Discharge Goal: 160', AAD,SBA/CGA    [PT] Stairs(Active)  Current Status(07/04/2022): 4, rails, Ranulfo  Weekly Goal(07/05/2022): PT only  Discharge Goal: 4, 1 rail, Min    Signed by: Alyx Whiting, PT

## 2022-07-05 LAB
ALBUMIN SERPL-MCNC: 2.9 G/DL (ref 3.5–5.2)
ALP SERPL-CCNC: 533 U/L (ref 39–117)
ALT SERPL W P-5'-P-CCNC: 26 U/L (ref 1–33)
ANION GAP SERPL CALCULATED.3IONS-SCNC: 11 MMOL/L (ref 5–15)
AST SERPL-CCNC: 35 U/L (ref 1–32)
BASOPHILS # BLD AUTO: 0.03 10*3/MM3 (ref 0–0.2)
BASOPHILS NFR BLD AUTO: 0.2 % (ref 0–1.5)
BILIRUB CONJ SERPL-MCNC: 0.2 MG/DL (ref 0–0.3)
BILIRUB INDIRECT SERPL-MCNC: 0.3 MG/DL
BILIRUB SERPL-MCNC: 0.5 MG/DL (ref 0–1.2)
BUN SERPL-MCNC: 28 MG/DL (ref 6–20)
BUN/CREAT SERPL: 11.4 (ref 7–25)
CALCIUM SPEC-SCNC: 7.7 MG/DL (ref 8.6–10.5)
CHLORIDE SERPL-SCNC: 97 MMOL/L (ref 98–107)
CO2 SERPL-SCNC: 25 MMOL/L (ref 22–29)
CREAT SERPL-MCNC: 2.45 MG/DL (ref 0.57–1)
DEPRECATED RDW RBC AUTO: 57.4 FL (ref 37–54)
EGFRCR SERPLBLD CKD-EPI 2021: 22.6 ML/MIN/1.73
EOSINOPHIL # BLD AUTO: 0.1 10*3/MM3 (ref 0–0.4)
EOSINOPHIL NFR BLD AUTO: 0.6 % (ref 0.3–6.2)
ERYTHROCYTE [DISTWIDTH] IN BLOOD BY AUTOMATED COUNT: 18.3 % (ref 12.3–15.4)
GLUCOSE BLDC GLUCOMTR-MCNC: 129 MG/DL (ref 70–130)
GLUCOSE BLDC GLUCOMTR-MCNC: 178 MG/DL (ref 70–130)
GLUCOSE BLDC GLUCOMTR-MCNC: 228 MG/DL (ref 70–130)
GLUCOSE BLDC GLUCOMTR-MCNC: 91 MG/DL (ref 70–130)
GLUCOSE SERPL-MCNC: 189 MG/DL (ref 65–99)
HCT VFR BLD AUTO: 27.9 % (ref 34–46.6)
HGB BLD-MCNC: 8.4 G/DL (ref 12–15.9)
IMM GRANULOCYTES # BLD AUTO: 0.08 10*3/MM3 (ref 0–0.05)
IMM GRANULOCYTES NFR BLD AUTO: 0.5 % (ref 0–0.5)
LYMPHOCYTES # BLD AUTO: 0.89 10*3/MM3 (ref 0.7–3.1)
LYMPHOCYTES NFR BLD AUTO: 5.7 % (ref 19.6–45.3)
MCH RBC QN AUTO: 26.2 PG (ref 26.6–33)
MCHC RBC AUTO-ENTMCNC: 30.1 G/DL (ref 31.5–35.7)
MCV RBC AUTO: 86.9 FL (ref 79–97)
MONOCYTES # BLD AUTO: 0.92 10*3/MM3 (ref 0.1–0.9)
MONOCYTES NFR BLD AUTO: 5.9 % (ref 5–12)
NEUTROPHILS NFR BLD AUTO: 13.63 10*3/MM3 (ref 1.7–7)
NEUTROPHILS NFR BLD AUTO: 87.1 % (ref 42.7–76)
NRBC BLD AUTO-RTO: 0 /100 WBC (ref 0–0.2)
PHOSPHATE SERPL-MCNC: 4.4 MG/DL (ref 2.5–4.5)
PLATELET # BLD AUTO: 204 10*3/MM3 (ref 140–450)
PMV BLD AUTO: 10.2 FL (ref 6–12)
POTASSIUM SERPL-SCNC: 5.1 MMOL/L (ref 3.5–5.2)
PROT SERPL-MCNC: 5.9 G/DL (ref 6–8.5)
RBC # BLD AUTO: 3.21 10*6/MM3 (ref 3.77–5.28)
SODIUM SERPL-SCNC: 133 MMOL/L (ref 136–145)
WBC NRBC COR # BLD: 15.65 10*3/MM3 (ref 3.4–10.8)

## 2022-07-05 PROCEDURE — 97110 THERAPEUTIC EXERCISES: CPT

## 2022-07-05 PROCEDURE — 82962 GLUCOSE BLOOD TEST: CPT

## 2022-07-05 PROCEDURE — 97530 THERAPEUTIC ACTIVITIES: CPT

## 2022-07-05 PROCEDURE — 84100 ASSAY OF PHOSPHORUS: CPT | Performed by: INTERNAL MEDICINE

## 2022-07-05 PROCEDURE — 63710000001 INSULIN LISPRO (HUMAN) PER 5 UNITS: Performed by: HOSPITALIST

## 2022-07-05 PROCEDURE — 97535 SELF CARE MNGMENT TRAINING: CPT

## 2022-07-05 PROCEDURE — 85025 COMPLETE CBC W/AUTO DIFF WBC: CPT | Performed by: PHYSICAL MEDICINE & REHABILITATION

## 2022-07-05 PROCEDURE — 80076 HEPATIC FUNCTION PANEL: CPT | Performed by: PHYSICAL MEDICINE & REHABILITATION

## 2022-07-05 PROCEDURE — 80048 BASIC METABOLIC PNL TOTAL CA: CPT | Performed by: INTERNAL MEDICINE

## 2022-07-05 RX ORDER — GABAPENTIN 100 MG/1
100 CAPSULE ORAL EVERY 24 HOURS
Status: DISCONTINUED | OUTPATIENT
Start: 2022-07-05 | End: 2022-07-22 | Stop reason: HOSPADM

## 2022-07-05 RX ADMIN — LEVOTHYROXINE SODIUM 150 MCG: 0.15 TABLET ORAL at 05:45

## 2022-07-05 RX ADMIN — SODIUM ZIRCONIUM CYCLOSILICATE 5 G: 5 POWDER, FOR SUSPENSION ORAL at 12:51

## 2022-07-05 RX ADMIN — ZOLPIDEM TARTRATE 2.5 MG: 5 TABLET ORAL at 21:02

## 2022-07-05 RX ADMIN — SERTRALINE 150 MG: 100 TABLET, FILM COATED ORAL at 08:27

## 2022-07-05 RX ADMIN — INSULIN LISPRO 2 UNITS: 100 INJECTION, SOLUTION INTRAVENOUS; SUBCUTANEOUS at 08:26

## 2022-07-05 RX ADMIN — ROPINIROLE HYDROCHLORIDE 0.75 MG: 0.5 TABLET, FILM COATED ORAL at 21:01

## 2022-07-05 RX ADMIN — GABAPENTIN 100 MG: 100 CAPSULE ORAL at 16:20

## 2022-07-05 RX ADMIN — INSULIN GLARGINE-YFGN 7 UNITS: 100 INJECTION, SOLUTION SUBCUTANEOUS at 08:26

## 2022-07-05 RX ADMIN — PANTOPRAZOLE SODIUM 40 MG: 40 TABLET, DELAYED RELEASE ORAL at 05:45

## 2022-07-05 RX ADMIN — MICONAZOLE NITRATE 1 APPLICATION: 2 CREAM TOPICAL at 21:05

## 2022-07-05 RX ADMIN — GABAPENTIN 300 MG: 300 CAPSULE ORAL at 21:02

## 2022-07-05 RX ADMIN — CLONIDINE HYDROCHLORIDE 0.2 MG: 0.1 TABLET ORAL at 08:27

## 2022-07-05 RX ADMIN — OXYCODONE 5 MG: 5 TABLET ORAL at 06:13

## 2022-07-05 RX ADMIN — CARVEDILOL 25 MG: 25 TABLET, FILM COATED ORAL at 18:05

## 2022-07-05 RX ADMIN — MICONAZOLE NITRATE 1 APPLICATION: 2 CREAM TOPICAL at 10:16

## 2022-07-05 RX ADMIN — CLONIDINE HYDROCHLORIDE 0.2 MG: 0.1 TABLET ORAL at 21:02

## 2022-07-05 RX ADMIN — OXYCODONE 5 MG: 5 TABLET ORAL at 21:42

## 2022-07-05 RX ADMIN — INSULIN LISPRO 3 UNITS: 100 INJECTION, SOLUTION INTRAVENOUS; SUBCUTANEOUS at 12:51

## 2022-07-05 RX ADMIN — HYDRALAZINE HYDROCHLORIDE 100 MG: 50 TABLET, FILM COATED ORAL at 05:45

## 2022-07-05 RX ADMIN — HYDRALAZINE HYDROCHLORIDE 100 MG: 50 TABLET, FILM COATED ORAL at 13:59

## 2022-07-05 RX ADMIN — CARVEDILOL 25 MG: 25 TABLET, FILM COATED ORAL at 07:36

## 2022-07-05 RX ADMIN — OXYCODONE 5 MG: 5 TABLET ORAL at 10:16

## 2022-07-05 RX ADMIN — LOSARTAN POTASSIUM 100 MG: 100 TABLET, FILM COATED ORAL at 08:28

## 2022-07-05 RX ADMIN — LIDOCAINE 1 PATCH: 50 PATCH CUTANEOUS at 08:27

## 2022-07-05 RX ADMIN — Medication 1 TABLET: at 08:27

## 2022-07-05 RX ADMIN — DIPHENOXYLATE HYDROCHLORIDE AND ATROPINE SULFATE 1 TABLET: 2.5; .025 TABLET ORAL at 05:45

## 2022-07-05 RX ADMIN — HYDRALAZINE HYDROCHLORIDE 100 MG: 50 TABLET, FILM COATED ORAL at 21:02

## 2022-07-05 RX ADMIN — OXYCODONE 5 MG: 5 TABLET ORAL at 16:23

## 2022-07-05 RX ADMIN — GABAPENTIN 300 MG: 300 CAPSULE ORAL at 08:27

## 2022-07-05 RX ADMIN — TAMSULOSIN HYDROCHLORIDE 0.4 MG: 0.4 CAPSULE ORAL at 08:28

## 2022-07-05 RX ADMIN — ASPIRIN 81 MG: 81 TABLET, COATED ORAL at 08:27

## 2022-07-05 NOTE — PROGRESS NOTES
"DAILY PROGRESS NOTE  [facility]    Patient Identification:  Name: Zaina Martinez  Age: 56 y.o.  Sex: female  :  1965  MRN: [id number]         Primary Care Physician: Karson Oreilly MD    Subjective:  Interval History:She complains of leg pain.      Objective:    Scheduled Meds:aspirin, 81 mg, Oral, Daily  b complex-vitamin c-folic acid, 1 tablet, Oral, Daily  carvedilol, 25 mg, Oral, BID With Meals  cloNIDine, 0.2 mg, Oral, Q12H  epoetin brooke/brooke-epbx, 10,000 Units, Intravenous, Once per day on   gabapentin, 100 mg, Oral, Q24H  gabapentin, 300 mg, Oral, BID  hydrALAZINE, 100 mg, Oral, Q8H  insulin glargine, 7 Units, Subcutaneous, QAM  insulin lispro, 0-7 Units, Subcutaneous, TID AC  levothyroxine, 150 mcg, Oral, Q AM  lidocaine, 1 patch, Transdermal, Q24H  losartan, 100 mg, Oral, Q24H  miconazole, 1 application, Topical, Q12H  pantoprazole, 40 mg, Oral, Q AM  rOPINIRole, 0.75 mg, Oral, Nightly  sertraline, 150 mg, Oral, Daily  sodium zirconium cyclosilicate, 5 g, Oral, Daily  tamsulosin, 0.4 mg, Oral, Daily      Continuous Infusions:     Vital signs in last 24 hours:  Temp:  [97.6 °F (36.4 °C)-98.9 °F (37.2 °C)] 98.9 °F (37.2 °C)  Heart Rate:  [55-64] 60  Resp:  [16-17] 16  BP: (125-190)/(64-80) 184/78    Intake/Output:    Intake/Output Summary (Last 24 hours) at 2022 1748  Last data filed at 2022 1756  Gross per 24 hour   Intake --   Output 2000 ml   Net -2000 ml       Exam:  BP (!) 184/78 (BP Location: Right arm, Patient Position: Lying)   Pulse 60   Temp 98.9 °F (37.2 °C) (Oral)   Resp 16   Ht 157.5 cm (62.01\")   Wt 75.5 kg (166 lb 7.2 oz)   SpO2 98%   BMI 30.44 kg/m²     General Appearance:    Alert, cooperative, no distress   Head:    Normocephalic, without obvious abnormality, atraumatic   Eyes:       Throat:   Lips, tongue, gums normal   Neck:   Supple, symmetrical, trachea midline, no JVD   Lungs:     Clear to auscultation bilaterally, respirations " unlabored   Chest Wall:    No tenderness or deformity    Heart:    Regular rate and rhythm, S1 and S2 normal, no murmur,no  Rub or gallop   Abdomen:     Soft, nontender, bowel sounds active, no masses, no organomegaly    Extremities:   Extremities normal, atraumatic, no cyanosis or edema   Pulses:      Skin:   Skin is warm and dry,  no rashes or palpable lesions   Neurologic:   no focal deficits noted      Lab Results (last 72 hours)     Procedure Component Value Units Date/Time    POC Glucose Once [[id number]]  (Normal) Collected: 07/02/22 1719    Specimen: Blood Updated: 07/02/22 1721     Glucose 102 mg/dL      Comment: Meter: [id number] : [id number] Smooth Miranda RN       POC Glucose Once [[id number]]  (Abnormal) Collected: 07/02/22 1631    Specimen: Blood Updated: 07/02/22 1635     Glucose 41 mg/dL      Comment: RN Notified R and V Meter: [id number] : [id number] Smooth Miranda RN       POC Glucose Once [[id number]]  (Normal) Collected: 07/02/22 1151    Specimen: Blood Updated: 07/02/22 1152     Glucose 97 mg/dL      Comment: Meter: [id number] : [id number] Randolph SAAVEDRA       POC Glucose Once [[id number]]  (Normal) Collected: 07/02/22 1024    Specimen: Blood Updated: 07/02/22 1025     Glucose 120 mg/dL      Comment: Meter: [id number] : [id number] Smooth Miranda RN       Basic Metabolic Panel [[id number]]  (Abnormal) Collected: 07/02/22 0828    Specimen: Blood Updated: 07/02/22 0933     Glucose 65 mg/dL      BUN 40 mg/dL      Creatinine 2.50 mg/dL      Sodium 135 mmol/L      Potassium 5.3 mmol/L      Chloride 98 mmol/L      CO2 24.0 mmol/L      Calcium 7.6 mg/dL      BUN/Creatinine Ratio 16.0     Anion Gap 13.0 mmol/L      eGFR 22.1 mL/min/1.73      Comment: National Kidney Foundation and American Society of Nephrology (ASN) Task Force recommended calculation based on the Chronic Kidney Disease Epidemiology Collaboration (CKD-EPI) equation refit without adjustment for race.       Narrative:      GFR  Normal >60  Chronic Kidney Disease <60  Kidney Failure <15      Hepatic Function Panel [[id number]]  (Abnormal) Collected: 07/02/22 0828    Specimen: Blood Updated: 07/02/22 0931     Total Protein 5.5 g/dL      Albumin 2.80 g/dL      ALT (SGPT) 14 U/L      AST (SGOT) 28 U/L      Alkaline Phosphatase 380 U/L      Total Bilirubin 0.4 mg/dL      Bilirubin, Direct <0.2 mg/dL      Bilirubin, Indirect --     Comment: Unable to calculate       Phosphorus [[id number]]  (Abnormal) Collected: 07/02/22 0828    Specimen: Blood Updated: 07/02/22 0931     Phosphorus 4.6 mg/dL     CBC & Differential [[id number]]  (Abnormal) Collected: 07/02/22 0828    Specimen: Blood Updated: 07/02/22 0904    Narrative:      The following orders were created for panel order CBC & Differential.  Procedure                               Abnormality         Status                     ---------                               -----------         ------                     CBC Auto Differential[[id number]]        Abnormal            Final result                 Please view results for these tests on the individual orders.    CBC Auto Differential [[id number]]  (Abnormal) Collected: 07/02/22 0828    Specimen: Blood Updated: 07/02/22 0904     WBC 21.35 10*3/mm3      RBC 3.21 10*6/mm3      Hemoglobin 8.4 g/dL      Hematocrit 26.8 %      MCV 83.5 fL      MCH 26.2 pg      MCHC 31.3 g/dL      RDW 18.9 %      RDW-SD 56.6 fl      MPV 10.0 fL      Platelets 234 10*3/mm3      Neutrophil % 81.5 %      Lymphocyte % 10.6 %      Monocyte % 6.7 %      Eosinophil % 0.1 %      Basophil % 0.1 %      Immature Grans % 1.0 %      Neutrophils, Absolute 17.40 10*3/mm3      Lymphocytes, Absolute 2.27 10*3/mm3      Monocytes, Absolute 1.42 10*3/mm3      Eosinophils, Absolute 0.02 10*3/mm3      Basophils, Absolute 0.02 10*3/mm3      Immature Grans, Absolute 0.22 10*3/mm3      nRBC 0.0 /100 WBC     POC Glucose Once [[id number]]  (Normal) Collected: 07/02/22 0614    Specimen: Blood  Updated: 07/02/22 0615     Glucose 92 mg/dL      Comment: Meter: [id number] : [id number] Xoomsys NA       POC Glucose Once [[id number]]  (Abnormal) Collected: 07/02/22 0253    Specimen: Blood Updated: 07/02/22 0254     Glucose 140 mg/dL      Comment: Meter: [id number] : [id number] Kushal Choe RN       POC Glucose Once [[id number]]  (Abnormal) Collected: 07/01/22 2036    Specimen: Blood Updated: 07/01/22 2037     Glucose 139 mg/dL      Comment: Meter: [id number] : [id number] [employer] NA       Hepatic Function Panel [[id number]]  (Abnormal) Collected: 07/01/22 1827    Specimen: Blood Updated: 07/01/22 1926     Total Protein 6.0 g/dL      Albumin 3.20 g/dL      ALT (SGPT) 18 U/L      AST (SGOT) 33 U/L      Alkaline Phosphatase 450 U/L      Total Bilirubin 0.5 mg/dL      Bilirubin, Direct 0.2 mg/dL      Bilirubin, Indirect 0.3 mg/dL     Basic Metabolic Panel [[id number]]  (Abnormal) Collected: 07/01/22 1827    Specimen: Blood Updated: 07/01/22 1926     Glucose 75 mg/dL      BUN 34 mg/dL      Creatinine 2.21 mg/dL      Sodium 134 mmol/L      Potassium 5.4 mmol/L      Chloride 97 mmol/L      CO2 24.0 mmol/L      Calcium 7.9 mg/dL      BUN/Creatinine Ratio 15.4     Anion Gap 13.0 mmol/L      eGFR 25.6 mL/min/1.73      Comment: National Kidney Foundation and American Society of Nephrology (ASN) Task Force recommended calculation based on the Chronic Kidney Disease Epidemiology Collaboration (CKD-EPI) equation refit without adjustment for race.       Narrative:      GFR Normal >60  Chronic Kidney Disease <60  Kidney Failure <15      Phosphorus [[id number]]  (Normal) Collected: 07/01/22 1827    Specimen: Blood Updated: 07/01/22 1926     Phosphorus 3.2 mg/dL     CBC & Differential [[id number]]  (Abnormal) Collected: 07/01/22 1827    Specimen: Blood Updated: 07/01/22 1909    Narrative:      The following orders were created for panel order CBC & Differential.  Procedure                                Abnormality         Status                     ---------                               -----------         ------                     CBC Auto Differential[[id number]]        Abnormal            Final result                 Please view results for these tests on the individual orders.    CBC Auto Differential [[id number]]  (Abnormal) Collected: 07/01/22 1827    Specimen: Blood Updated: 07/01/22 1909     WBC 24.21 10*3/mm3      RBC 3.60 10*6/mm3      Hemoglobin 9.4 g/dL      Hematocrit 30.0 %      MCV 83.3 fL      MCH 26.1 pg      MCHC 31.3 g/dL      RDW 19.0 %      RDW-SD 56.4 fl      MPV 10.3 fL      Platelets 288 10*3/mm3      Neutrophil % 94.6 %      Lymphocyte % 2.7 %      Monocyte % 1.4 %      Eosinophil % 0.0 %      Basophil % 0.1 %      Immature Grans % 1.2 %      Neutrophils, Absolute 22.89 10*3/mm3      Lymphocytes, Absolute 0.65 10*3/mm3      Monocytes, Absolute 0.34 10*3/mm3      Eosinophils, Absolute 0.00 10*3/mm3      Basophils, Absolute 0.03 10*3/mm3      Immature Grans, Absolute 0.30 10*3/mm3      nRBC 0.0 /100 WBC     POC Glucose Once [[id number]]  (Abnormal) Collected: 07/01/22 1606    Specimen: Blood Updated: 07/01/22 1607     Glucose 287 mg/dL      Comment: Meter: [id number] : [id number] Dierken Jessica L RN       POC Glucose Once [[id number]]  (Normal) Collected: 07/01/22 1246    Specimen: Blood Updated: 07/01/22 1247     Glucose 126 mg/dL      Comment: Meter: [id number] : [id number] Dierken Jessica L RN       POC Glucose Once [[id number]]  (Normal) Collected: 07/01/22 0639    Specimen: Blood Updated: 07/01/22 0641     Glucose 95 mg/dL      Comment: Meter: [id number] : [id number] [employer] CNA       POC Glucose Once [[id number]]  (Abnormal) Collected: 07/01/22 0208    Specimen: Blood Updated: 07/01/22 0210     Glucose 186 mg/dL      Comment: Meter: [id number] : [id number] Schneider Daliyan CNA       POC Glucose Once [[id number]]  (Abnormal) Collected: 06/30/22 2032    Specimen: Blood  Updated: 06/30/22 2034     Glucose 251 mg/dL      Comment: Meter: [id number] : [id number] Jennie Gilliam CNA       POC Glucose Once [[id number]]  (Abnormal) Collected: 06/30/22 1615    Specimen: Blood Updated: 06/30/22 1616     Glucose 197 mg/dL      Comment: Meter: [id number] : [id number] Makharadze Firuza NA       POC Glucose Once [[id number]]  (Normal) Collected: 06/30/22 1149    Specimen: Blood Updated: 06/30/22 1201     Glucose 100 mg/dL      Comment: Meter: [id number] : [id number] Makharadze Firuza NA       POC Glucose Once [[id number]]  (Abnormal) Collected: 06/30/22 1115    Specimen: Blood Updated: 06/30/22 1117     Glucose 68 mg/dL      Comment: Meter: [id number] : [id number] Makharadze Firuza NA       POC Glucose Once [[id number]]  (Abnormal) Collected: 06/30/22 1100    Specimen: Blood Updated: 06/30/22 1102     Glucose 55 mg/dL      Comment: Meter: [id number] : [id number] Makharadze Firuza NA       POC Glucose Once [[id number]]  (Abnormal) Collected: 06/30/22 1045    Specimen: Blood Updated: 06/30/22 1045     Glucose 55 mg/dL      Comment: Meter: [id number] : [id number] Edson MCELROY RN       Basic Metabolic Panel [[id number]]  (Abnormal) Collected: 06/30/22 0912    Specimen: Blood Updated: 06/30/22 1041     Glucose 91 mg/dL      BUN 43 mg/dL      Creatinine 2.61 mg/dL      Sodium 134 mmol/L      Potassium 5.3 mmol/L      Chloride 100 mmol/L      CO2 23.0 mmol/L      Calcium 7.6 mg/dL      BUN/Creatinine Ratio 16.5     Anion Gap 11.0 mmol/L      eGFR 21.0 mL/min/1.73      Comment: National Kidney Foundation and American Society of Nephrology (ASN) Task Force recommended calculation based on the Chronic Kidney Disease Epidemiology Collaboration (CKD-EPI) equation refit without adjustment for race.       Narrative:      GFR Normal >60  Chronic Kidney Disease <60  Kidney Failure <15      POC Glucose Once [[id number]]  (Abnormal) Collected: 06/30/22 1034    Specimen: Blood Updated: 06/30/22  1040     Glucose 43 mg/dL      Comment: Result Not Confirmed Meter: [id number] : [id number] Edson MCELROY AMY       Hepatic Function Panel [[id number]]  (Abnormal) Collected: 06/30/22 0912    Specimen: Blood Updated: 06/30/22 1031     Total Protein 5.9 g/dL      Albumin 2.60 g/dL      ALT (SGPT) 15 U/L      AST (SGOT) 30 U/L      Alkaline Phosphatase 426 U/L      Total Bilirubin 0.4 mg/dL      Bilirubin, Direct 0.3 mg/dL      Bilirubin, Indirect 0.1 mg/dL     Phosphorus [[id number]]  (Normal) Collected: 06/30/22 0912    Specimen: Blood Updated: 06/30/22 1031     Phosphorus 4.0 mg/dL     CBC & Differential [[id number]]  (Abnormal) Collected: 06/30/22 0912    Specimen: Blood Updated: 06/30/22 1010    Narrative:      The following orders were created for panel order CBC & Differential.  Procedure                               Abnormality         Status                     ---------                               -----------         ------                     CBC Auto Differential[[id number]]        Abnormal            Final result                 Please view results for these tests on the individual orders.    CBC Auto Differential [[id number]]  (Abnormal) Collected: 06/30/22 0912    Specimen: Blood Updated: 06/30/22 1010     WBC 19.91 10*3/mm3      RBC 3.25 10*6/mm3      Hemoglobin 8.5 g/dL      Hematocrit 27.2 %      MCV 83.7 fL      MCH 26.2 pg      MCHC 31.3 g/dL      RDW 18.1 %      RDW-SD 53.9 fl      MPV 10.0 fL      Platelets 315 10*3/mm3      Neutrophil % 81.8 %      Lymphocyte % 11.2 %      Monocyte % 5.4 %      Eosinophil % 0.2 %      Basophil % 0.1 %      Immature Grans % 1.3 %      Neutrophils, Absolute 16.30 10*3/mm3      Lymphocytes, Absolute 2.23 10*3/mm3      Monocytes, Absolute 1.07 10*3/mm3      Eosinophils, Absolute 0.03 10*3/mm3      Basophils, Absolute 0.02 10*3/mm3      Immature Grans, Absolute 0.26 10*3/mm3      nRBC 0.1 /100 WBC     POC Glucose Once [[id number]]  (Normal) Collected: 06/30/22 0631     Specimen: Blood Updated: 06/30/22 0633     Glucose 117 mg/dL      Comment: Meter: [id number] : [id number] Smyzer Inge NA       POC Glucose Once [[id number]]  (Abnormal) Collected: 06/30/22 0310    Specimen: Blood Updated: 06/30/22 0311     Glucose 181 mg/dL      Comment: Meter: [id number] : [id number] Smyzer Inge NA       POC Glucose Once [[id number]]  (Abnormal) Collected: 06/29/22 2046    Specimen: Blood Updated: 06/29/22 2048     Glucose 219 mg/dL      Comment: Meter: [id number] : [id number] Smyzer Inge NA       POC Glucose Once [[id number]]  (Normal) Collected: 06/29/22 1901    Specimen: Blood Updated: 06/29/22 1902     Glucose 101 mg/dL      Comment: Meter: [id number] : [id number] Cricket Harley NA       POC Glucose Once [[id number]]  (Abnormal) Collected: 06/29/22 1842    Specimen: Blood Updated: 06/29/22 1844     Glucose 56 mg/dL      Comment: Meter: [id number] : [id number] Anup SAAVEDRA           Data Review:  Results from last 7 days   Lab Units 07/05/22  0949 07/04/22  0704 07/03/22  1219   SODIUM mmol/L 133* 134* 131*   POTASSIUM mmol/L 5.1 6.1* 7.3*   CHLORIDE mmol/L 97* 99 97*   CO2 mmol/L 25.0 25.0 23.9   BUN mg/dL 28* 33* 57*   CREATININE mg/dL 2.45* 2.33* 3.53*   GLUCOSE mg/dL 189* 138* 117*   CALCIUM mg/dL 7.7* 7.4* 7.3*     Results from last 7 days   Lab Units 07/05/22  0949 07/04/22  0704 07/03/22  0927   WBC 10*3/mm3 15.65* 17.66* 23.31*   HEMOGLOBIN g/dL 8.4* 8.0* 8.3*   HEMATOCRIT % 27.9* 24.4* 27.0*   PLATELETS 10*3/mm3 204 182 206             Lab Results   Lab Value Date/Time    TROPONINT 0.092 (C) 12/19/2021 1314    TROPONINT 0.117 (C) 11/30/2021 0153    TROPONINT 0.132 (C) 11/29/2021 2121         Results from last 7 days   Lab Units 07/05/22  0949 07/04/22  0704 07/03/22  0927   ALK PHOS U/L 533* 390* 436*   BILIRUBIN mg/dL 0.5 0.4 0.5   BILIRUBIN DIRECT mg/dL 0.2 0.2 0.3   ALT (SGPT) U/L 26 27 36*   AST (SGOT) U/L 35* 41* 64*              Glucose   Date/Time Value Ref Range Status   07/05/2022 1626 91 70 - 130 mg/dL Final     Comment:     Meter: [id number] : [id number] Carlton SAAVEDRA   07/05/2022 1126 228 (H) 70 - 130 mg/dL Final     Comment:     Meter: [id number] : [id number] Carlton Thompson NA   07/05/2022 0629 178 (H) 70 - 130 mg/dL Final     Comment:     Meter: [id number] : [id number] León Young    07/04/2022 2115 253 (H) 70 - 130 mg/dL Final     Comment:     Meter: [id number] : [id number] León Young    07/04/2022 1611 104 70 - 130 mg/dL Final     Comment:     Meter: [id number] : [id number] Otilia Blanca    07/04/2022 1546 111 70 - 130 mg/dL Final     Comment:     Meter: [id number] : [id number] Sinha [facility]   07/04/2022 1102 207 (H) 70 - 130 mg/dL Final     Comment:     Meter: [id number] : [id number] Otilia Blanca    07/04/2022 0642 140 (H) 70 - 130 mg/dL Final     Comment:     Meter: [id number] : [id number] [facility]           Past Medical History:   Diagnosis Date   • Acute CVA (cerebrovascular accident) (HCC) 5/1/2022   • Acute on chronic diastolic CHF (congestive heart failure) ([facility])    • CAD (coronary artery disease) 12/20/2021   • Diabetes ([facility])    • Disease of thyroid gland    • GERD (gastroesophageal reflux disease)    • Hyperlipidemia 11/30/2018   • Hypertension    • Rheumatoid arthritis (HCC)        Assessment:  Active Hospital Problems    Diagnosis  POA   • 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       Plan:  Insulin same dose.  Continue same and monitor.  The trend is better.    Aris Isbell MD  7/5/2022  17:48 EDT

## 2022-07-05 NOTE — PLAN OF CARE
Goal Outcome Evaluation:       Pt A/Ox4, calm/cooperative, not OOB overnight. Meds taken whole w thin liquids. Pt c/o pain to L upper medial thigh/ groin ; prn Oxycodone given x2. Prn Ambien given for sleep. Pt incontinent of some urine. Bladder scans completed; no intervention warranted. Pt incontinent of loose stool. Cdiff specimen sent per protocol. Result negative. Dr. Joyce ordered prn Lomotil (which was previously ordered for this patient). HTN this morning; scheduled BP meds given.

## 2022-07-05 NOTE — THERAPY TREATMENT NOTE
Inpatient Rehabilitation - Occupational Therapy Treatment Note    [facility]     Patient Name: Zaina Martinez  : 1965  MRN: [id number]    Today's Date: 2022                 Admit Date: 2022         ICD-10-CM ICD-9-CM   1. Generalized weakness  R53.1 780.79   2. Diabetic muscle infarction ([facility])  E11.69 250.80    M62.20 728.89   3. Other insomnia  G47.09 780.52       Patient Active Problem List   Diagnosis   • Renal insufficiency   • Hypertensive disorder   • Hypothyroidism   • Type 2 diabetes mellitus with kidney complication, with long-term current use of insulin ([facility])   • Rheumatoid arthritis ([facility])   • Angioedema   • Esophageal dysmotility   • Anemia   • Medically noncompliant   • Myocardial infarction due to demand ischemia ([facility])   • Enteritis   • PRES (posterior reversible encephalopathy syndrome)   • Urine retention   • Klebsiella infection   • Superficial thrombophlebitis   • Generalized weakness   • ESRD (end stage renal disease) ([facility])   • CAD (coronary artery disease)   • Abnormal urinalysis   • Chronic diastolic CHF (congestive heart failure) ([facility])   • Pyelonephritis   • Calculus of gallbladder with acute on chronic cholecystitis without obstruction   • Pleural effusion on right   • Anemia due to chronic kidney disease, on chronic dialysis ([facility])   • Abnormal findings on diagnostic imaging of other specified body structures   • Acute upper respiratory infection   • Agitation   • Alkaline phosphatase raised   • Casts present in urine   • Cellulitis of toe   • Hip pain   • Community acquired pneumonia   • Depressive disorder   • Diarrhea of presumed infectious origin   • Difficult or painful urination   • Disease due to severe acute respiratory syndrome coronavirus 2 (SARS-CoV-2)   • Dyspnea   • Encounter for follow-up examination after completed treatment for conditions other than malignant neoplasm   • H/O: hypothyroidism   • Hyperlipidemia   • Hypomagnesemia   • Intractable vomiting with  nausea   • Leukocytosis   • Luetscher's syndrome   • Need for influenza vaccination   • Restless legs   • Noncompliance with treatment   • Shoulder pain   • Urinary tract infectious disease   • Metabolic encephalopathy   • Abnormal findings on diagnostic imaging of abdomen   • Status post cholecystectomy   • Hyponatremia   • Leukocytosis   • Acute metabolic encephalopathy   • Encephalopathy, toxic   • Acute CVA (cerebrovascular accident) (HCC)   • Intracranial hemorrhage (HCC)   • Stroke (HCC)   • Abnormal urinalysis   • Diabetic muscle infarction (HCC)   • Other insomnia       Past Medical History:   Diagnosis Date   • Acute CVA (cerebrovascular accident) (HCC) 5/1/2022   • Acute on chronic diastolic CHF (congestive heart failure) (HCC)    • CAD (coronary artery disease) 12/20/2021   • Diabetes (HCC)    • Disease of thyroid gland    • GERD (gastroesophageal reflux disease)    • Hyperlipidemia 11/30/2018   • Hypertension    • Rheumatoid arthritis (HCC)        Past Surgical History:   Procedure Laterality Date   • CHOLECYSTECTOMY WITH INTRAOPERATIVE CHOLANGIOGRAM N/A 1/10/2022    Procedure: Laparoscopic cholecystectomy with intraoperative cholangiogram;  Surgeon: Ramana Raygoza MD;  Location: [facility];  Service: General;  Laterality: N/A;   • EYE SURGERY     • HYSTERECTOMY     • INSERTION HEMODIALYSIS CATHETER N/A 12/6/2021    Procedure: HEMODIALYSIS CATHETER INSERTION;  Surgeon: Keli Salazar MD;  Location: [facility] 18/19;  Service: Vascular;  Laterality: N/A;   • INSERTION HEMODIALYSIS CATHETER N/A 5/3/2022    Procedure: TUNNELED CATHETER PLACEMENT;  Surgeon: Keli Salazar MD;  Location: [facility] OR;  Service: Vascular;  Laterality: N/A;   • MUSCLE BIOPSY Left 6/15/2022    Procedure: Left quadriceps muscle biopsy;  Surgeon: Marques Ma MD;  Location: [facility] OR;  Service: Neurosurgery;  Laterality: Left;             IRF OT ASSESSMENT FLOWSHEET (last 12 hours)     IRF OT  Evaluation and Treatment     Row Name 07/05/22 1612 07/05/22 1200       OT Time and Intention    Document Type daily treatment  -AF daily treatment  -AF    Mode of Treatment occupational therapy  -AF occupational therapy  -AF    Patient Effort fair  -AF fair  -AF    Row Name 07/05/22 1612 07/05/22 1200       General Information    Patient/Family/Caregiver Comments/Observations pt sitting up in w/c after PT session, fatigued  -AF --    General Observations of Patient -- pt didnot d/chome on friday as planned, OT will continue to work on functional goals with ADL tasks  -AF    Existing Precautions/Restrictions fall;other (see comments)  -AF fall;other (see comments)  contact precautions  -AF    Row Name 07/05/22 1612 07/05/22 1200       Pain Assessment    Pretreatment Pain Rating 7/10  -AF 8/10  -AF    Posttreatment Pain Rating 8/10  -AF 9/10  -AF    Pain Location - Side/Orientation Left  -AF Left  -AF    Pain Location - groin  -AF groin  thigh  -AF    Row Name 07/05/22 1612 07/05/22 1200       Cognition/Psychosocial    Affect/Mental Status (Cognition) flat/blunted affect  -AF flat/blunted affect  -AF    Behavioral Issues (Cognition) -- other (see comments)  tearful duing session  -AF    Orientation Status (Cognition) oriented x 3  -AF oriented x 3  -AF    Follows Commands (Cognition) follows one-step commands  -AF follows one-step commands  -AF    Personal Safety Interventions fall prevention program maintained;gait belt;nonskid shoes/slippers when out of bed  -AF fall prevention program maintained;gait belt;nonskid shoes/slippers when out of bed  -AF    Row Name 07/05/22 1200          Bathing    [place] Level (Bathing) bathing skills;contact guard assist;minimum assist (75% patient effort);verbal cues  -AF     Assistive Device (Bathing) grab bar/tub rail;hand held shower spray hose;tub bench  -AF     Position (Bathing) supported sitting;supported standing  -AF     Row Name 07/05/22 1200          Upper Body  Dressing    [place] Level (Upper Body Dressing) upper body dressing skills;set up assistance  -AF     Position (Upper Body Dressing) supported sitting  -AF     Comment (Upper Body Dressing) seated on shower chair  -AF     Row Name 07/05/22 1200          Lower Body Dressing    [place] Level (Lower Body Dressing) don;doff;maximum assist (25% patient effort)  -AF     Position (Lower Body Dressing) supported sitting;supported standing  -AF     Comment (Lower Body Dressing) w/c level  -AF     Row Name 07/05/22 1200          Grooming    [place] Level (Grooming) grooming skills;set up  -AF     Position (Grooming) sink side;supported sitting  -AF     Set-up Assistance (Grooming) obtain supplies  -AF     Comment (Grooming) w/c level  -AF     Row Name 07/05/22 1612 07/05/22 1200       Bed Mobility    Supine-Sit [place] (Bed Mobility) -- standby assist;verbal cues  -AF    Sit-Supine [place] (Bed Mobility) supervision;standby assist  -AF --    Assistive Device (Bed Mobility) bed rails  -AF bed rails  -AF    Row Name 07/05/22 1200          Transfer Assessment/Treatment    Comment, (Transfers) sit to  the shower with grab bar CGA and increaed time  -AF     Row Name 07/05/22 1612 07/05/22 1200       Transfers    Bed-Chair [place] (Transfers) -- minimum assist (75% patient effort);contact guard;verbal cues  -AF    Chair-Bed [place] (Transfers) minimum assist (75% patient effort)  -AF --    Assistive Device (Bed-Chair Transfers) -- wheelchair  -AF    [place] Level (Shower Transfer) -- minimum assist (75% patient effort);verbal cues;contact guard  -AF    Assistive Device (Shower Transfer) -- grab bar, tub/shower;tub bench;wheelchair  -AF    Row Name 07/05/22 1612          Chair-Bed Transfer    Assistive Device (Chair-Bed Transfers) wheelchair;walker, front-wheeled  -AF     Row Name 07/05/22 1200          Shower Transfer    Type (Shower Transfer) stand pivot/stand step  -AF     Comment,  (Shower Transfer) with increased time  -AF     Row Name 07/05/22 1612          Motor Skills    Functional Endurance fair -  -AF     Row Name 07/05/22 1612          Shoulder (Therapeutic Exercise)    Shoulder Strengthening (Therapeutic Exercise) bilateral;scapular stabilization;sitting;2 lb free weight;10 repetitions;2 sets  -AF     Row Name 07/05/22 1612          Elbow/Forearm (Therapeutic Exercise)    Elbow/Forearm Strengthening (Therapeutic Exercise) bilateral;flexion;supination;extension;pronation;sitting;2 lb free weight;10 repetitions;2 sets  -AF     Row Name 07/05/22 1612          Wrist (Therapeutic Exercise)    Wrist Strengthening (Therapeutic Exercise) bilateral;flexion;extension;2 lb free weight;10 repetitions;2 sets  -AF     Row Name 07/05/22 1200          Balance    Static Sitting Balance independent  -AF     Dynamic Sitting Balance standby assist  -AF     Static Standing Balance contact guard  with use of grabbar in shwoer  -AF     Row Name 07/05/22 1612 07/05/22 1200       Positioning and Restraints    Pre-Treatment Position sitting in chair/recliner  -AF in bed  -AF    Post Treatment Position bed  -AF wheelchair  -AF    In Bed supine;call light within reach;encouraged to call for assist;exit alarm on  -AF --    In Wheelchair -- sitting;call light within reach;encouraged to call for assist;exit alarm on  -AF          User Key  (r) = Recorded By, (t) = Taken By, (c) = Cosigned By    Initials Name Effective Dates    AF Tasha Helm, OTR 06/16/21 -                  Occupational Therapy Education                 Title: PT OT SLP Therapies (In Progress)     Topic: Occupational Therapy (Resolved)     Point: ADL training (Resolved)     Description:   Instruct learner(s) on proper safety adaptation and remediation techniques during self care or transfers.   Instruct in proper use of assistive devices.              Learning Progress Summary           Patient Acceptance, E,TB,D, VU,NR by JS at 6/25/2022 7973       Show all documentation for this point (2)                 Point: Home exercise program (Resolved)     Description:   Instruct learner(s) on appropriate technique for monitoring, assisting and/or progressing therapeutic exercises/activities.              Learning Progress Summary           Patient Acceptance, E,D,H, VU by AF at 6/30/2022 1544    Comment: review of HEP with hand weights and theraband      Show all documentation for this point (2)                 Point: Precautions (Resolved)     Description:   Instruct learner(s) on prescribed precautions during self-care and functional transfers.              Learning Progress Summary           Patient Acceptance, E,TB,D, VU,NR by  at 6/25/2022 1434      Show all documentation for this point (2)                 Point: Body mechanics (Resolved)     Description:   Instruct learner(s) on proper positioning and spine alignment during self-care, functional mobility activities and/or exercises.              Learning Progress Summary           Patient Acceptance, E,TB,D, VU,NR by  at 6/25/2022 1434      Show all documentation for this point (2)                             User Key     Initials Effective Dates Name Provider Type Discipline     06/16/21 -  Fany Lima, PT Physical Therapist PT    AF 06/16/21 -  Tasha Helm OTR Occupational Therapist OT                    OT Recommendation and Plan                         Time Calculation:      Time Calculation- OT     Row Name 07/05/22 1614 07/05/22 1215          Time Calculation- OT    OT Start Time 1300  -AF 0830  -AF     OT Stop Time 1330  -AF 0930  -AF     OT Time Calculation (min) 30 min  -AF 60 min  -AF           User Key  (r) = Recorded By, (t) = Taken By, (c) = Cosigned By    Initials Name Provider Type    AF Tasha Helm, OTR Occupational Therapist              Therapy Charges for Today     Code Description Service Date Service Provider Modifiers Qty    47606088495 HC OT SELF CARE/MGMT/TRAIN EA 15 MIN  7/5/2022 Tasha Helm, OTR GO 4    20410615975 HC OT THER PROC EA 15 MIN 7/5/2022 Tasha Helm OTR GO 1    26521886490 HC OT SELF CARE/MGMT/TRAIN EA 15 MIN 7/5/2022 Tasha Helm OTR GO 1                   RAMO Mike  7/5/2022

## 2022-07-05 NOTE — PROGRESS NOTES
"Reviewed team conference report regarding current status/progress, goals, and d/c plans with patient. Patient was scheduled for d/c on Friday, 7/1 and could not get into 's SUV. They expressed concerns about patient being able to do steps and get out of home so d/c was cancelled per rehab MD. Patient needs to be able to get out of home as she goes to dialysis MWF as outpatient. Patient did not do well yesterday in PT and stated she didn't do as well today but did walk some. She complained of her left leg swelling and being painful. Discussed with patient option of going to sub acute facility if requires more assistance than family can provide and give more time for left leg to get better. She stated she doesn't want to go to nursing home as she will \"just sit there.\" Discussed options of facilities that have hemodialysis available on site. She stated she has been to [facility] before and did not have good experience, especially with nursing staff. She wants to go home and stated her daughter can be there to assist in daytime when  is at work. She stated daughter works from home so can bring her laptop and work from their home. Tried to call --left voicemail to call to discuss plans. Will assist with plans.   "

## 2022-07-05 NOTE — PROGRESS NOTES
Inpatient Rehabilitation Plan of Care Note    Plan of Care  Care Plan Reviewed - Updates as Follows    Body Systems    [RN] Genitourinary(Active)  Current Status(07/05/2022): Patient is a M,W,F HD patient with a tunneled  catheter to the R chest. Pt produces little urine.  pt oliguric. New cath order  6/20: straight cath daily and prn until cath residual < 300 ml x3 days.  Weekly Goal(07/12/2022): Pt will plan to transition to community dialysis.  Discharge Goal: Pt will transition to community dialysis.    [RN] Endocrine(Active)  Current Status(07/05/2022): Patient is a diabetic.  Weekly Goal(07/12/2022): Blood glucose will be controlled.  Discharge Goal: Patient will have medication regimen that she can manage at  home.        Pain    [RN] Pain Management(Active)  Current Status(07/05/2022): Pt has consistent pain to L groin/L medial upper  thigh . Muscle biopsy site to L thigh .  Weekly Goal(07/12/2022): Pain will be controlled.  Discharge Goal: Pt will have pain management regimen that she can follow at  home.        Psychosocial    [RN] Coping/Adjustment(Active)  Current Status(07/05/2022): Pt is A/Ox4. Pt is at increased risk for reduced  coping r/t hospitalization and comorbidities. Pt has been calling for assistance  appropriately as needed. Pt has supportive family.  Weekly Goal(07/12/2022): Pt will verbalize needs, feelings, concerns to staff as  needed.  Discharge Goal: Pt will verbalize adequate coping strategies to use at home.        Safety    [RN] Potential for Injury(Active)  Current Status(07/05/2022): Patient is at increased risk for falls/injury r/t  hospitalization and generalized weakness.  Weekly Goal(07/12/2022): Patient will call appropriately for assistance as  needed.  Discharge Goal: Patient will remain free from falls/injury.        Sphincter Control    [RN] Bowel and bladder (Active)  Current Status(07/05/2022): Patient can be continent and is often incontinent of  stool. Patient is  oliguric and is an HD patient. New cath order 6/20.  Weekly Goal(07/12/2022): Pt will be continent 75% of the time. Patient will have  BM at least q3 days or within normal patterns.  Discharge Goal: Patient will be continent 100% of the time.    Signed by: Daija Fatima RN

## 2022-07-05 NOTE — PROGRESS NOTES
Case Management  Inpatient Rehabilitation Team Conference    Conference Date/Time: 7/5/2022 8:01:26 AM    Team Conference Attendees:  Dr. Adonis Matthews, Bernadette Jaeger, Pharmacist  Liset Bonilla, LAUREW  Liz Rodriguez, PTA  Daiana Luque, PT  Tasha Helm, OT  Liset Fair, CTRS  Carol Jennings RD, LD  Brisa Baires, AMY Sandhu, RN  Chaplain Olivia    Demographics            Age: 56Y            Gender: Female    Admission Date: 5/13/2022 7:05:00 PM  Rehabilitation Diagnosis:  Hemorrhage, Left Pietro  Past Medical History: Past Medical History:  DiagnosisDate  ?Acute CVA (cerebrovascular accident) (HCC)5/1/2022  ?Acute on chronic diastolic CHF (congestive heart failure) (HCC)?  ?CAD (coronary artery disease)12/20/2021  ?Diabetes (HCC)?  ?Disease of thyroid gland?  ?GERD (gastroesophageal reflux disease)?  ?Ymshfqoqxjquxi95/30/2018  ?Hypertension?  ?Rheumatoid arthritis (HCC)?    ?  ?  Family History  ProblemRelationAge of Onset  ?Malig HyperthermiaNeg Hx?  ?  ?      Plan of Care  Anticipated Discharge Date/Estimated Length of Stay: ELOS: DC 6/30  Anticipated Discharge Destination: Community discharge with assistance  Discharge Plan : Discharge on 7/1 cancelled when pt was unable to transfer into  the family SUV.  did not attend scheduled family teaching on 7/1.  Medical Necessity Expected Level Rationale: Goals are to acheieve a level of CGA  with mobility and ADL's.  Rehabilitation prognosis indeterminate.  Medical  prognosis indeterminate.  Intensity and Duration: an average of 3 hours/5 days per week  Medical Supervision and 24 Hour Rehab Nursing: x  Physical Therapy: x  PT Intensity/Duration: 1 hour / day, 5 days / week, for approximately 2 weeks.  Occupational Therapy: x  OT Intensity/Duration: 1 hour / day, 5 days / week, for approximately 2 weeks.  Speech and Language Therapy: x  SLP Intensity/Duration: 1 hour / day, 5 days / week, for approximately 2 weeks.  Social Work:  x  Therapeutic Recreation: x  Psychology: x  Registered Dietician: x  Updated (if changes indicated)    Anticipated Discharge Date/Estimated Length of Stay:   ELOS: Pending placement  Anticipated Discharge Date/Estimated Length of Stay:   ELOS: Pending placement    Based on the patient's medical and functional status, their prognosis and  expected level of functional improvement is: Goals are to acheieve a level of  CGA with mobility and ADL's.  Rehabilitation prognosis indeterminate.  Medical  prognosis indeterminate.      Interdisciplinary Problem/Goals/Status    All Rehab Problems:  Body Systems    [RN] Genitourinary(Active)  Current Status(07/05/2022): Patient is a M,W,F HD patient with a tunneled  catheter to the R chest. Pt produces little urine.  pt oliguric. New cath order  6/20: straight cath daily and prn until cath residual < 300 ml x3 days.  Weekly Goal(07/12/2022): Pt will plan to transition to community dialysis.  Discharge Goal: Pt will transition to community dialysis.    [RN] Endocrine(Active)  Current Status(07/05/2022): Patient is a diabetic.  Weekly Goal(07/12/2022): Blood glucose will be controlled.  Discharge Goal: Patient will have medication regimen that she can manage at  home.        Cognition    [ST] Executive Functions(Active)  Current Status(06/27/2022): slow response and procesisng times; mod impaired  language/word retrieval; improved ability to formulate simple sentences for  structured tasks/conversations; mod impaired verbal memory  Weekly Goal(06/28/2022): recall details from daily events indep[  Discharge Goal: fxnl cognition for basic ADL activities        Mobility    [OT] Toilet Transfers(Active)  Current Status(07/04/2022): min A  Weekly Goal(07/05/2022): CGA  Discharge Goal: CGA    [OT] Tub/Shower Transfers(Active)  Current Status(07/04/2022): min A  Weekly Goal(07/05/2022): CGA  Discharge Goal: CGA    [PT] Bed/Chair/Wheelchair(Active)  Current Status(07/04/2022):  Philip . Up  to min/ModA  Weekly Goal(07/05/2022): CGA  Discharge Goal: CGA    [PT] Bed Mobility(Active)  Current Status(07/04/2022): sit to supine [place]. Supine to sit SBA  Weekly Goal(07/05/2022): SBA  Discharge Goal: SBA    [PT] Car Transfers(Active)  Current Status(07/04/2022): CGA, Rwx in gym, Min with modified to higher seat,  rwx  Weekly Goal(07/05/2022): PT only  Discharge Goal: Min    [PT] Walk(Active)  Current Status(07/04/2022):  160', rwx, CGA/SBA  Weekly Goal(07/05/2022): to BR, Rwx , CGA/SBA  Discharge Goal: 160', AAD,SBA/CGA    [PT] Stairs(Active)  Current Status(07/04/2022): 4, rails, [place]fo  Weekly Goal(07/05/2022): PT only  Discharge Goal: 4, 1 rail, Min        Pain    [RN] Pain Management(Active)  Current Status(07/05/2022): Pt has consistent pain to L groin/L medial upper  thigh . Muscle biopsy site to L thigh .  Weekly Goal(07/12/2022): Pain will be controlled.  Discharge Goal: Pt will have pain management regimen that she can follow at  home.        Psychosocial    [RN] Coping/Adjustment(Active)  Current Status(07/05/2022): Pt is A/Ox4. Pt is at increased risk for reduced  coping r/t hospitalization and comorbidities. Pt has been calling for assistance  appropriately as needed. Pt has supportive family.  Weekly Goal(07/12/2022): Pt will verbalize needs, feelings, concerns to staff as  needed.  Discharge Goal: Pt will verbalize adequate coping strategies to use at home.    [RN] Behavior(Active)  Current Status(01/01/1753):  Weekly Goal(01/01/1753):  Discharge Goal:        Safety    [RN] Potential for Injury(Active)  Current Status(07/05/2022): Patient is at increased risk for falls/injury r/t  hospitalization and generalized weakness.  Weekly Goal(07/12/2022): Patient will call appropriately for assistance as  needed.  Discharge Goal: Patient will remain free from falls/injury.        Self Care    [OT] Bathing(Active)  Current Status(07/04/2022): UB-SBA, LB-min A with LLE  Weekly Goal(07/05/2022):  CGA  Discharge Goal: CGA    [OT] Dressing (Lower)(Active)  Current Status(07/04/2022): Mod A with LLE  Weekly Goal(07/05/2022): MIN  Discharge Goal: CGA    [OT] Dressing (Upper)(Active)  Current Status(07/04/2022): set up  Weekly Goal(07/05/2022): SBA  Discharge Goal: SBA    [OT] Grooming(Active)  Current Status(07/04/2022): set up  Weekly Goal(07/05/2022): SBA  Discharge Goal: SBA    [OT] Toileting(Active)  Current Status(07/04/2022): min A  Weekly Goal(07/05/2022): CGA  Discharge Goal: CGA        Sphincter Control    [RN] Bowel and bladder (Active)  Current Status(07/05/2022): Patient can be continent and is often incontinent of  stool. Patient is oliguric and is an HD patient. New cath order 6/20.  Weekly Goal(07/12/2022): Pt will be continent 75% of the time. Patient will have  BM at least q3 days or within normal patterns.  Discharge Goal: Patient will be continent 100% of the time.        Comments: 5/17 Slow processing, participating with therapies    5/24 Min A with transfers, CGA with ambulation with rwx.  Freq incontinent of  bowel.   Mod memory impairments.  Groin pain.  T, Th, Sat dialsysis schedule.    5/31 Family Conference last Friday, Plan dc on 6/2 with home health.  40' CGA  with rwx, limited due to pain.  Slow responses and processing at times.    6/7 Amb 80' and up to as far as 160' CGA, toilet transfers CGA, caleb tto do 4  steps CGA with rails.  Better initiation, improving in speech therapy.  C/O side  pain.  HD ongoing.  Elevated BP since HD yesterday.    6/14 A lot of pain still ongoing, biopsy of leg planned for Wednesday.  Still  moderate memory deficits.  Toilet transfers CGA, Bathing CGA.  LBD tasks need  more assistance due to pain.    6/21 Bed exercises only due to pain.  Still waiting on leg bx.  More alert with  steroids on board.  Pain main issue.    6/28 Biopsy did not really show anything in particular.  Running some additional  staining on the biopsy.  Remain on steroids.  Getting  blood sugars under control  while on steroids.  Ambulating ' rxwx.  Min A with bed to chair  transfers.  Better participation in therapy.  Able to do 4 steps with rails Min  A. Min A with toilet transfers.    7/5 Suspect diabetic muscle infarction.  Amb 160' CGA / SBA rwx, able to do 4  steps with rails Min A.  Toilet transfers Min A.  High potassium a couple times  requiring dialysis.    Signed by: Brisa Baires RN    Physician CoSigned By: Adonis Florentino 07/05/2022 09:10:15

## 2022-07-05 NOTE — PLAN OF CARE
Goal Outcome Evaluation:  Plan of Care Reviewed With: patient      Pt has been working with therapies today. Oxy 5 given for pain to left thigh. Transfering with one staff.

## 2022-07-05 NOTE — THERAPY TREATMENT NOTE
Inpatient Rehabilitation - Physical Therapy Treatment Note       [facility]     Patient Name: Zaina Martinez  : 1965  MRN: [id number]    Today's Date: 2022                    Admit Date: 2022      Visit Dx:     ICD-10-CM ICD-9-CM   1. Generalized weakness  R53.1 780.79   2. Diabetic muscle infarction (Formerly [facility])  E11.69 250.80    M62.20 728.89   3. Other insomnia  G47.09 780.52       Patient Active Problem List   Diagnosis   • Renal insufficiency   • Hypertensive disorder   • Hypothyroidism   • Type 2 diabetes mellitus with kidney complication, with long-term current use of insulin (Formerly [facility])   • Rheumatoid arthritis (Formerly [facility])   • Angioedema   • Esophageal dysmotility   • Anemia   • Medically noncompliant   • Myocardial infarction due to demand ischemia (Formerly [facility])   • Enteritis   • PRES (posterior reversible encephalopathy syndrome)   • Urine retention   • Klebsiella infection   • Superficial thrombophlebitis   • Generalized weakness   • ESRD (end stage renal disease) (Formerly [facility])   • CAD (coronary artery disease)   • Abnormal urinalysis   • Chronic diastolic CHF (congestive heart failure) (Formerly [facility])   • Pyelonephritis   • Calculus of gallbladder with acute on chronic cholecystitis without obstruction   • Pleural effusion on right   • Anemia due to chronic kidney disease, on chronic dialysis (Formerly [facility])   • Abnormal findings on diagnostic imaging of other specified body structures   • Acute upper respiratory infection   • Agitation   • Alkaline phosphatase raised   • Casts present in urine   • Cellulitis of toe   • Hip pain   • Community acquired pneumonia   • Depressive disorder   • Diarrhea of presumed infectious origin   • Difficult or painful urination   • Disease due to severe acute respiratory syndrome coronavirus 2 (SARS-CoV-2)   • Dyspnea   • Encounter for follow-up examination after completed treatment for conditions other than malignant neoplasm   • H/O: hypothyroidism   • Hyperlipidemia   • Hypomagnesemia   • Intractable  vomiting with nausea   • Leukocytosis   • Luetscher's syndrome   • Need for influenza vaccination   • Restless legs   • Noncompliance with treatment   • Shoulder pain   • Urinary tract infectious disease   • Metabolic encephalopathy   • Abnormal findings on diagnostic imaging of abdomen   • Status post cholecystectomy   • Hyponatremia   • Leukocytosis   • Acute metabolic encephalopathy   • Encephalopathy, toxic   • Acute CVA (cerebrovascular accident) (HCC)   • Intracranial hemorrhage (HCC)   • Stroke (HCC)   • Abnormal urinalysis   • Diabetic muscle infarction (HCC)   • Other insomnia       Past Medical History:   Diagnosis Date   • Acute CVA (cerebrovascular accident) (HCC) 5/1/2022   • Acute on chronic diastolic CHF (congestive heart failure) (HCC)    • CAD (coronary artery disease) 12/20/2021   • Diabetes (HCC)    • Disease of thyroid gland    • GERD (gastroesophageal reflux disease)    • Hyperlipidemia 11/30/2018   • Hypertension    • Rheumatoid arthritis (HCC)        Past Surgical History:   Procedure Laterality Date   • CHOLECYSTECTOMY WITH INTRAOPERATIVE CHOLANGIOGRAM N/A 1/10/2022    Procedure: Laparoscopic cholecystectomy with intraoperative cholangiogram;  Surgeon: Ramana Raygoza MD;  Location: [facility];  Service: General;  Laterality: N/A;   • EYE SURGERY     • HYSTERECTOMY     • INSERTION HEMODIALYSIS CATHETER N/A 12/6/2021    Procedure: HEMODIALYSIS CATHETER INSERTION;  Surgeon: Keli Salazar MD;  Location: [facility] 18/19;  Service: Vascular;  Laterality: N/A;   • INSERTION HEMODIALYSIS CATHETER N/A 5/3/2022    Procedure: TUNNELED CATHETER PLACEMENT;  Surgeon: Keli Salazar MD;  Location: [facility];  Service: Vascular;  Laterality: N/A;   • MUSCLE BIOPSY Left 6/15/2022    Procedure: Left quadriceps muscle biopsy;  Surgeon: Marques Ma MD;  Location: [facility];  Service: Neurosurgery;  Laterality: Left;       PT ASSESSMENT (last 12 hours)     IRF PT  Evaluation and Treatment     Row Name 07/05/22 1044 07/05/22 1027       PT Time and Intention    Document Type daily treatment  - progress note  -    Mode of Treatment physical therapy  -LB individual therapy;physical therapy  -    Patient/Family/Caregiver Comments/Observations Pnt seated in w/c. Tearful. C/O L groin/thigh pain  -LB pt seated in WC no acute distress  -    Row Name 07/05/22 1044 07/05/22 1027       General Information    Patient Profile Reviewed -- yes  -    Existing Precautions/Restrictions fall;other (see comments)   contact precautions.No strenuous strengthening ex L quads or L hamstring, 2ary to suspected diabetic muscle infartion L thigh. Able to work on fuctional tasks, transfers, gait stairs, strengthening BUE's, RLE.  -LB fall;other (see comments)   per Dr Florentino-With suspected diabetic muscle infarction left thigh, no strenuous strengthening exercises left quadriceps or left hamstrings. Able to work on functional tasks, transfers, gait, stairs, strengthening  -    Row Name 07/05/22 1044 07/05/22 1027       Pain Assessment    Pretreatment Pain Rating 7/10  -LB 8/10  -    Posttreatment Pain Rating -- 8/10  -    Pain Location - Side/Orientation Left  -LB Left  -    Pain Location -- upper  -    Pain Location - groin  thigh  -LB --  thigh  -LH    Pre/Posttreatment Pain Comment pnt received pain meds several minutes ago  - --    Row Name 07/05/22 1027          Pain Scale: Word Pre/Post-Treatment    Pain Intervention(s) Repositioned;Medication (See MAR)  -[facility] Name 07/05/22 1027          Cognition/Psychosocial    Affect/Mental Status (Cognition) flat/blunted affect  -     Behavioral Issues (Cognition) other (see comments)  pt tearful throughout session  -     Orientation Status (Cognition) oriented x 3  -     Personal Safety Interventions fall prevention program maintained;gait belt;supervised activity;nonskid shoes/slippers when out of bed  -[facility] Name  07/05/22 1027          Range of Motion Comprehensive    Comment, General Range of Motion RLE AROM WNL, LLE AROM grossly limited due to pain  -[facility] Name 07/05/22 1027          Lower Extremity (Manual Muscle Testing)    Comment, MMT: Lower Extremity RLE grossly at least 4/5, pain LLE limiting full ROM on MMT  -[facility] Name 07/05/22 1027          Left Hip (Manual Muscle Testing)    MMT, Gross Movement: Left Hip Flexion (3-/5) fair minus  -[facility] Name 07/05/22 1027          Left Knee (Manual Muscle Testing)    MMT, Gross Movement: Left Knee Extension (3-/5) fair minus  -     MMT, Gross Movement: Left Knee Flexion (3-/5) fair minus  -[facility] Name 07/05/22 1027          Left Ankle/Foot (Manual Muscle Testing)    MMT, Gross Movement: Left Ankle Dorsiflexion (3/5) fair  -[facility] Name 07/05/22 1044 07/05/22 1027       Bed Mobility    Comment, (Bed Mobility) NT. Up in w/c  -LB NT  -[facility] Name 07/05/22 1044 07/05/22 1027       Transfers    Sit-Stand [place] (Transfers) moderate assist (50% patient effort);maximum assist (25% patient effort)  -LB moderate assist (50% patient effort);verbal cues;nonverbal cues (demo/gesture)  -    Stand-Sit [place] (Transfers) minimum assist (75% patient effort)  -LB minimum assist (75% patient effort);contact guard;verbal cues;nonverbal cues (demo/gesture)  -[facility] Name 07/05/22 1044 07/05/22 1027       Sit-Stand Transfer    Assistive Device (Sit-Stand Transfers) walker, front-wheeled;wheelchair  -LB walker, front-wheeled  -[facility] Name 07/05/22 1044 07/05/22 1027       Stand-Sit Transfer    Assistive Device (Stand-Sit Transfers) walker, front-wheeled;wheelchair  -LB walker, front-wheeled  -[facility] Name 07/05/22 1044 07/05/22 1027       Gait/Stairs (Locomotion)    [place] Level (Gait) minimum assist (75% patient effort);1 person to manage equipment  helper followed w w/c  -LB minimum assist (75% patient effort);1 person to manage equipment  2nd  "person following w  for safety  -    Assistive Device (Gait) walker, front-wheeled  -LB walker, front-wheeled  -LH    Distance in Feet (Gait) 1' in AM. 17' in PM  -LB 15  -LH    Pattern (Gait) step-to;step-through  -LB step-to;step-through  -LH    Deviations/Abnormal Patterns (Gait) antalgic  -LB left sided deviations;antalgic;kenn decreased  -LH    Bilateral Gait Deviations heel strike decreased;forward flexed posture  -LB heel strike decreased;forward flexed posture  -LH    Comment, (Gait/Stairs) -- max encouragement required for mobilization this session  -    Row Name 07/05/22 1044          Wheelchair Mobility/Management    Method of Wheelchair Locomotion (Mobility) bimanual (upper extremity) propulsion  -LB     Mobility Activities (Wheelchair) steering;turning  -LB     Forward Propulsion [place] (Wheelchair) minimum assist (75% patient effort);verbal cues  -LB     Steering [place] (Wheelchair) minimum assist (75% patient effort);verbal cues  -LB     Turning [place] (Wheelchair) minimum assist (75% patient effort);verbal cues  -LB     Distance Propelled in Feet (Wheelchair) 35  -LB     Row Name 07/05/22 1044          Curb Negotiation (Mobility)    [place], Curb Negotiation dependent  -LB     Assistive Device (Curb Negotiation) walker, front-wheeled  -LB     Comment, Curb Negotiation (Mobility) attempted to back up a 6\" curb to simulate stepping on a platform to back into car. Using Rwx, Pnt got her RLE up on 6\" curb Stated \"I feel like I'm going to fall.\" Had pnt return foot to floor.  -LB     Row Name 07/05/22 1027          Balance    Static Sitting Balance independent  -     Position, Sitting Balance unsupported;sitting in chair;other (see comments)  in WC  -     Row Name 07/05/22 1044          Hip (Therapeutic Exercise)    Hip Isometrics (Therapeutic Exercise) bilateral;gluteal sets;sitting;10 repetitions  -LB     Row Name 07/05/22 1044          Knee (Therapeutic Exercise)    " "Knee AROM (Therapeutic Exercise) bilateral;flexion;extension;sitting;10 repetitions  -     Row Name 07/05/22 1044          Ankle (Therapeutic Exercise)    Ankle AROM (Therapeutic Exercise) bilateral;dorsiflexion;plantarflexion;sitting;10 repetitions  -     Row Name 07/05/22 1044 07/05/22 1027       Positioning and Restraints    Pre-Treatment Position -- sitting in chair/recliner  -    Post Treatment Position wheelchair  -LB wheelchair  -    In Wheelchair call light within reach;exit alarm on;sitting  -LB call light within reach;sitting;encouraged to call for assist;exit alarm on  -    Row Name 07/05/22 1027          Therapy Assessment/Plan (PT)    Rehab Potential/Prognosis (PT) good, to achieve stated therapy goals  -     Activity Limitations Related to Problem List (PT) home management activity not performed adequately or safely  -     Comment, Therapy Assessment/Plan (PT) Currently all LTGs ongoing, DC delayed as spouse has unable to come in for teaching to aide in safe transitioning to home. Currently LLE pain limiting optimal progression w skilled PT- per Dr Florentino- \"With suspected diabetic muscle infarction left thigh, no strenuous strengthening exercises left quadriceps or left hamstrings. Able to work on functional tasks, transfers, gait, stairs, strengthening\". Will continue to prepare for home as able and appropriate.  -     Row Name 07/05/22 1027          Bed Mobility Goal 1 (PT-IRF)    Activity/Assistive Device (Bed Mobility Goal 1, PT-IRF) bed mobility activities, all  -     [place] Level (Bed Mobility Goal 1, PT-IRF) contact guard required;standby assist  -     Time Frame (Bed Mobility Goal 1, PT-IRF) 2 weeks  -     Progress/Outcomes (Bed Mobility Goal 1, PT-IRF) goal ongoing  -     Row Name 07/05/22 1027          Transfer Goal 1 (PT-IRF)    Activity/Assistive Device (Transfer Goal 1, PT-IRF) bed-to-chair/chair-to-bed  -     [place] Level (Transfer Goal 1, PT-IRF) " contact guard required  -     Time Frame (Transfer Goal 1, PT-IRF) 2 weeks  -     Progress/Outcomes (Transfer Goal 1, PT-IRF) goal ongoing  -     Row Name 07/05/22 1027          Transfer Goal 2 (PT-IRF)    Activity/Assistive Device (Transfer Goal 2, PT-IRF) car transfer  -     [place] Level (Transfer Goal 2, PT-IRF) minimum assist (75% or more patient effort)  -     Time Frame (Transfer Goal 2, PT-IRF) 2 weeks  -     Progress/Outcomes (Transfer Goal 2, PT-IRF) goal ongoing  -     Row Name 07/05/22 1027          Gait/Walking Locomotion Goal 1 (PT-IRF)    Activity/Assistive Device (Gait/Walking Locomotion Goal 1, PT-IRF) gait (walking locomotion);walker, rolling  -     Gait/Walking Locomotion Distance Goal 1 (PT-IRF) 160'  -     [place] Level (Gait/Walking Locomotion Goal 1, PT-IRF) contact guard required  -     Time Frame (Gait/Walking Locomotion Goal 1, PT-IRF) 2 weeks  -     Progress/Outcomes (Gait/Walking Locomotion Goal 1, PT-IRF) goal ongoing  -     Row Name 07/05/22 1027          Stairs Goal 1 (PT-IRF)    Activity/Assistive Device (Stairs Goal 1, PT-IRF) ascending stairs;descending stairs;using handrail, right  -     Number of Stairs (Stairs Goal 1, PT-IRF) 4  -     [place] Level (Stairs Goal 1, PT-IRF) minimum assist (75% or more patient effort)  -     Time Frame (Stairs Goal 1, PT-IRF) 2 weeks  -     Progress/Outcomes (Stairs Goal 1, PT-IRF) goal ongoing  -           User Key  (r) = Recorded By, (t) = Taken By, (c) = Cosigned By    Initials Name Provider Type     Liz Fu, PT Physical Therapist    LB Liz Rodriguez, PTA Physical Therapist Assistant              Wound 06/15/22 1312 Left anterior thigh Incision (Active)   Dressing Appearance open to air 07/04/22 2330   Closure Liquid skin adhesive 07/05/22 0834   Base clean;dry 07/05/22 0834   Periwound intact;dry 07/05/22 0834   Drainage Amount none 07/05/22 0834     Physical Therapy Education                  Title: PT OT SLP Therapies (In Progress)     Topic: Physical Therapy (In Progress)     Point: Mobility training (In Progress)     Learning Progress Summary           Patient Acceptance, E, NR by LB at 7/5/2022 1218   Significant Other Refuses, E, NR by KP at 7/4/2022 1245    Comment:  did not show for teaching      Show all documentation for this point (34)                 Point: Home exercise program (Resolved)     Learning Progress Summary           Patient Acceptance, E,H, VU by LB at 6/29/2022 1058      Show all documentation for this point (11)                 Point: Body mechanics (Resolved)     Learning Progress Summary           Patient Acceptance, E,TB,D, VU,NR by  at 6/25/2022 1434      Show all documentation for this point (8)                 Point: Precautions (Resolved)     Learning Progress Summary           Patient Acceptance, E,TB,D, VU,NR by  at 6/25/2022 1434      Show all documentation for this point (8)                             User Key     Initials Effective Dates Name Provider Type Discipline     06/16/21 -  Fany Lima, PT Physical Therapist PT     03/07/18 -  Liz Rodriguez, PTA Physical Therapist Assistant PT     06/16/21 -  Alyx Whiting, PT Physical Therapist PT                PT Recommendation and Plan      Patient was wearing a face mask during this therapy encounter. Therapist used appropriate personal protective equipment including mask and gloves.  Mask used was standard procedure mask. Appropriate PPE was worn during the entire therapy session. Hand hygiene was completed before and after therapy session. Patient is not in enhanced droplet precautions.                        Time Calculation:      PT Charges     Row Name 07/05/22 1254 07/05/22 1043 07/05/22 1036       Time Calculation    Start Time 1230  -LB 1030  -LB 1000  -LH    Stop Time 1300  -LB 1100  -LB 1030  -LH    Time Calculation (min) 30 min  -LB 30 min  -LB 30 min  -LH    PT Received On  -- -- 07/05/22  -    PT - Next Appointment -- -- 07/06/22  -    PT Goal Re-Cert Due Date -- -- 07/12/22  -       Time Calculation- PT    Total Timed Code Minutes- PT -- -- 30 minute(s)  -          User Key  (r) = Recorded By, (t) = Taken By, (c) = Cosigned By    Initials Name Provider Type     iLz Fu, PT Physical Therapist    LB Liz Rodriguez PTA Physical Therapist Assistant                Therapy Charges for Today     Code Description Service Date Service Provider Modifiers Qty    [id number] HC PT THER PROC EA 15 MIN 7/5/2022 Liz Rodriguez PTA GP 4            PT G-Codes  AM-PAC 6 Clicks Score (PT): 17      Liz Rodriguez PTA  7/5/2022

## 2022-07-05 NOTE — PROGRESS NOTES
CC: Debility    SUBJECTIVE:  Patient complains of burning pain in the left thigh.  No pain above the hip or below the knee.  No pain in the calf.  No pain in the other 3 extremities.  She had dialysis the past 2 days due to hyperkalemia.  Transfers have more recently been minimal-moderate assist.  She is ambulated min assist 30-80feet.  Discussed increasing gabapentin slightly for the burning pain in the left thigh, watch for any myoclonic twitching side effects.            OBJECTIVE:  Vitals:    07/05/22 0734   BP: (!) 186/76   Pulse: 64   Resp:    Temp:    SpO2: 92%       GENERAL: NAD  MENTAL STATUS: Awake alert  HEENT:  NCAT  CARDIOVASCULAR: RRR   CHEST:  hemodialysis access right chest  RESPIRATORY: Normal respirations. CTA  ABDOMEN: Soft, non tender, non distended, +BS,    No tenderness     EXTREMITIES: Left thigh edema persists.  Overall the amount of edema appears similar..    Edema bilateral lower extremities  Left thigh is tender to palpation  Left quadricep muscle biopsy site-dressed  No myoclonus.  NEUROLOGIC:    Pain inhibition proximal LLE  She is able to resistance with left knee extension and has good strength with left ankle dorsiflexion,  left elbow flexion, left finger flexion.  Good strength right upper extremity right lower extremity      Scheduled Meds:aspirin, 81 mg, Oral, Daily  b complex-vitamin c-folic acid, 1 tablet, Oral, Daily  carvedilol, 25 mg, Oral, BID With Meals  cloNIDine, 0.2 mg, Oral, Q12H  epoetin brooke/brooke-epbx, 10,000 Units, Intravenous, Once per day on Mon Wed Fri  gabapentin, 300 mg, Oral, BID  hydrALAZINE, 100 mg, Oral, Q8H  insulin glargine, 7 Units, Subcutaneous, QAM  insulin lispro, 0-7 Units, Subcutaneous, TID AC  levothyroxine, 150 mcg, Oral, Q AM  lidocaine, 1 patch, Transdermal, Q24H  losartan, 100 mg, Oral, Q24H  miconazole, 1 application, Topical, Q12H  pantoprazole, 40 mg, Oral, Q AM  rOPINIRole, 0.75 mg, Oral, Nightly  sertraline, 150 mg, Oral, Daily  sodium  zirconium cyclosilicate, 5 g, Oral, Daily  tamsulosin, 0.4 mg, Oral, Daily      Continuous Infusions:   PRN Meds:.•  acetaminophen  •  dextrose  •  dextrose  •  diphenoxylate-atropine  •  glucagon (human recombinant)  •  heparin (porcine)  •  hydrALAZINE  •  nitroglycerin  •  oxyCODONE  •  oxyCODONE  •  sodium chloride  •  zolpidem    Glucose   Date/Time Value Ref Range Status   07/05/2022 1126 228 (H) 70 - 130 mg/dL Final     Comment:     Meter: [id number] : [id number] Carlton Thompson    07/05/2022 0629 178 (H) 70 - 130 mg/dL Final     Comment:     Meter: [id number] : [id number] León Young    07/04/2022 2115 253 (H) 70 - 130 mg/dL Final     Comment:     Meter: [id number] : [id number] Traore Young    07/04/2022 1611 104 70 - 130 mg/dL Final     Comment:     Meter: [id number] : [id number] Otilia Blanca    07/04/2022 1546 111 70 - 130 mg/dL Final     Comment:     Meter: [id number] : [id number] Bharath Mason    07/04/2022 1102 207 (H) 70 - 130 mg/dL Final     Comment:     Meter: [id number] : [id number] Otilia Blanca    07/04/2022 0642 140 (H) 70 - 130 mg/dL Final     Comment:     Meter: [id number] : [id number] Traore Young    07/04/2022 0221 143 (H) 70 - 130 mg/dL Final     Comment:     Meter: [id number] : [id number] Traore [facility]     Results from last 7 days   Lab Units 07/05/22  0949 07/04/22  0704 07/03/22  0927   WBC 10*3/mm3 15.65* 17.66* 23.31*   HEMOGLOBIN g/dL 8.4* 8.0* 8.3*   HEMATOCRIT % 27.9* 24.4* 27.0*   PLATELETS 10*3/mm3 204 182 206     Results from last 7 days   Lab Units 07/05/22  0949 07/04/22  0704 07/03/22  1219 07/03/22  0927   SODIUM mmol/L 133* 134* 131* 135*   POTASSIUM mmol/L 5.1 6.1* 7.3* 6.9*   CHLORIDE mmol/L 97* 99 97* 99   CO2 mmol/L 25.0 25.0 23.9 24.0   BUN mg/dL 28* 33* 57* 56*   CREATININE mg/dL 2.45* 2.33* 3.53* 3.48*   CALCIUM mg/dL 7.7* 7.4* 7.3* 7.5*   BILIRUBIN mg/dL 0.5 0.4  --  0.5   ALK PHOS U/L 533* 390*  --  436*   ALT  (SGPT) U/L 26 27  --  36*   AST (SGOT) U/L 35* 41*  --  64*   GLUCOSE mg/dL 189* 138* 117* 165*      Latest Reference Range & Units 05/27/22 11:30   Creatine Kinase 20 - 180 U/L 94   Aldolase 3.3 - 10.3 U/L 5.6       Imaging Results (Last 24 Hours)     ** No results found for the last 24 hours. **          ASSESSMENT AND PLAN    # R MCA s/p TPA with subsequent ICH with debility  Initially held antiplatelet/anticoagulation.  Reviewed with neurology on May 20 and resume aspirin 81 mg daily  SBP goal <160  Recommend outpatient Neurology fu - repeat MRI in 3 months        # DM  June 27-hyperglycemia on steroids.  Lantus increased to 30 units twice daily, Humalog 5 units 3 times daily with meals, and all increase sliding scale coverage  June 28 -hypoglycemic after increasing insulin dosing.  Blood glucose up to 98 prior to lunch, will hold sliding scale insulin and give 5 units scheduled with meal  June 30-hypoglycemia-Lantus twice daily decreased from 35 to 20 units.  Scheduled Humalog with meals discontinued  July 1-prednisone has been discontinued.  Reviewed with internal medicine and Lantus decreased to 10 units twice daily and on discharge home to resume her home regimen of Lantus 10 units daily.  She will check her sugar tonight at home and if elevated give Lantus 10 units tonight and then continue with the Lantus 10 units daily tomorrow.  Reviewed with patient and her  that her sugars may be elevated initially as she just completed prednisone.  They were instructed to call for any additional instructions on adjustment in regimen if it remains elevated at home this weekend     # ESRD   Nephrology following  Inpatient HD    Flomax  Hyperkalemia  July 5-patient had hemodialysis on July 3 and July 4 for hyperkalemia.  Lokelma resumed by nephrology     Infectious disease-   # s/p catheter infection with MRSA  Vancomycin IV with stop date of May 26  May 24-vancomycin random equal 12.90-on vancomycin 500 mg IV on  Pkrvelv-Qeokftfp-Gczaoyda  May 26-to complete course of vancomycin today  #Urinary tract infection-on Dana 3 urinalysis was negative for infection but noted to have leukocytosis increase and repeat urinalysis on June 6 shows findings consistent with urinary tract infection.  Placed on a course of cefdinir renal dose 3 mg daily for 7 days.  No costophrenic angle tenderness patient reviewed with infectious disease service.  Leukocytosis felt related to the bladder infection and not previous catheter infection.  June 8-urine culture results pending.  On cefdinir.  Culture with E. coli, sensitivity pending  June 9 - E coli sensitive to cephalosporins.  June 21 - continues with leukocytosis WBC  15K today. May be from inflammatory process. Steroids added yesterday.   June 22-urine described as milky characteristic, different from previous-recheck urinalysis with culture if indicated  June 23-Labs are still pending today.  Urinalysis also pending as she does not make much urine.  She had a temperature of 100.2 last evening, afebrile this morning.  June 27 - abnormal UA but urine culture from June 24 no growth  July 1-leukocytosis felt related to the noninfectious process in the left thigh as well as secondary to steroids  July 5-WBC trending down to 15.6 today     #left hydroureteronephrosis-Dana 3-CT scan shows left hydro ureter nephrosis.  She had some previous dilation of the ureter on comparison the past studies but increased today.  Bladder noted to be markedly distended.  Will do in and out cath now and daily to decompress bladder.  Addendum-intermittent cath for 600 cc.  June 4-once daily intermittent cath 450 cc.  June 5-seen by urology-Nguyễn catheter placed with plans to recheck hydroureter on renal ultrasound in 3 to 5 days.  June 6-Nguyễn catheter placed yesterday by urology, with only 170 cc out so far.  Patient reports did not note any change in her left groin pain after the in and out cath with decompression  of the bladder..  June 8-reviewed with urology service-request noncontrast CT scan stone protocol to evaluate for resolution of the left hydronephrosis with urology planning to see tomorrow to review the films and then make recommendations, urology would recommend leaving the Nguyễn catheter in for the time being.  June 9 - improved left hydroureter on CT , Nguyễn discontinued.   July 1-to follow-up with urology as an outpatient with follow-up CT planned in August.  She occasionally required intermittent straight cath but this was very infrequent.  Home health nursing to follow.  Continues on Flomax     #Anemia-  EPO.  June 6-hemoglobin 7.8  June 21- hemoglobin 8.3  June 27 - hemoglobin 8.7  June 28 - hemoglobin 8.8     Lymphadenopathy-evaluated by hematology oncology while here.  No significant change from scans earlier this year.  She is to repeat a scan in 3 months and follow-up with hematology oncology     #Elevated alkaline phosphatase  June 6-elevated alkaline phosphatase 770, up from 289 one week ago, 140 one month ago. Similar elevation in March, Feb even higher at 1,330 ( intra-abdominal fluid collection, underwent CT-guided aspiration  Revealed blood and cultures did not reveal any growth and therefore antibiotics  discontinued.  Surgery also did evaluate and signed off.), and elevated during admission in January ( She was seen by general surgery who recommended and performed laparoscopic cholecystectomy during that admission).   ALT 70, AST 87, Total bilirubin 0.8. Ca 8.3.  ? If liver source or bone or due to an inflammatory response.  June 7-GGT also elevated.  Seen by gastroenterology.  [place] biliary source.  Liver ultrasound ordered  June 8-liver ultrasound was unremarkable  June 21 - patient declined liver biopsy.   June 27-gastroenterology following peripherally-plans to follow-up as an outpatient and review option of liver biopsy again if alkaline phosphatase remains elevated  June 28-remains elevated  at 503  June 30-alkaline phosphatase lower at 426     #Pain - neuropathy BL lower extremity/left thigh pain  Gabapentin/oxycodone.    June 7-patient with lethargy this morning.  May be due to urinary tract infection but with recent increase and gabapentin and oxycodone, will change gabapentin to 200 mg twice a day and decrease oxycodone from 5 back down to 2.5 mg p.o. every 4 hours as needed  June 8-better speed with her processing today  June 21 - pain med regimen recently adjusted with tramadol 25 mg q 4 hours prn, gabapentin 300 g bid, lidoderm patch, oxycodone 2.5 mg q 6 hours prn. Prednisone 60 mg daily added which may help with pain from inflammatory process. Reports pain better today and participated better in therapies.   June 27 - continue present regimen  July 1-home on oxycodone 5 mg p.o. every 4 hours.  Pain dispense #40, gabapentin 300 mg twice daily, Tylenol 500 mg every 6 hours as needed  July 5-increase gabapentin slightly 300-100-300 mg.  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  Seen by infectious disease service with no acute infectious process noted.  Evaluated by hematology regarding lymphadenopathy, lymph node felt too small to biopsy.  Patient was on a course of low-dose prednisone 10 mg for 3 days then 5 mg for 2 days and then another 10 mg dose with out any significant improvement in the swelling or pain.  She has a history of rheumatoid arthritis.  See CT of the left thigh and MRI of the left thigh and pelvis reports   With lumbar  radiculoplexitis and diabetic amyotrophy, on review of the literature do not see edema that she presents with associated with the findings or MRI changes in the tendon and musculature with edema.  There is descriptions of MRI changes in the plexus and peripheral nerve.  In terms of treatment options for diabetic amyotrophy , there have not been definitive clinical trials.  Immunomodulation with steroids has been inconclusive as well as other immunomodulation therapy.  Reviewed with the patient  that  feel that she would benefit from seeing her rheumatologist as an outpatient to evaluate this further, as there does appear to be inflammatory cause given the lymphadenopathy and edema.  Rheumatologist does not come to the hospital.  CPK x2 has been normal.  Aldolase has been normal.  May need to look at biopsy of left thigh muscle to assess further.   June 8-patient reviewed with neurology yesterday who will assess and also provide input regarding whether muscle biopsy may be helpful.  June 9 - Discussed with Neurology and Rheumatology - plan is for EMG and then muscle biopsy.   Dana 15 - Left quadricep muscle biopsy was completed 6/15, results pending.  Labs: CKs have been normal, ANCA panel 6/11 was unremarkable  June 21 - started on full treatment dose of Prednisone 60 mg daily yesterday pending path results. Reports pain better so far today. Specimen sent to [facility]. Clinical impression presently focal inflammatory myositis pending final pathology results.   June 22-pain continues better today.  June 23-outside pathology report still pending  June 24 - Patient reports left thigh pain better over past couple days but still present. Does not have the soreness at medial thigh as before. Complains of pain at mid-thigh. No change in swelling. Does have discomfort with proximal movement in LLE. Walked 320 feet CTG SBA RW today.   Pain improved on steroid. Discontinued atorvastatin. Biopsy results returned  from [facility] - see report -Type 2 fiber atrophy, advanced. Denervation/reinnervation. No evidence of inflammatory myopathy or vasculitis. [facility] lab pursuing a dystrophy panel and will report findings in an addendum.  Reviewed with Neurology - as shown symptomatic response, continue Prednisone 60 mg daily for about one more week, then taper off over month.   June 27 - continue present regimen  June 28-increased pain medication regimen to oxycodone 5 mg every 4 hours as needed for severe pain.   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 Treatment would be aspirin which she is already on. Discussed with Neurology and as been on Prednisone 60 mg for only 1.5 week, will stop and not do taper. Discussed with Internal Medicine who will adjust insulin.  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  Reviewed with the patient that treatment for this is aspirin which she is already on.  She may do walking activities but would avoid strenuous activities with left quadricep strengthening.  She may strengthen the other 3 extremities as tolerated.  Reviewed may take 3 months to resolve.        # HTN   Coreg  Clonidine  Hydralazine  Losartan  Nephrology/internal medicine following     #Hypothyroidism  Levothyroxine  June 9 - recheck TSH- addendum Dana 10- On Nov 30, 2021 , on Dec 2, 2021 T4 = 0.90, on Dec 9, T4=1.68. On May 7, TSH = 134. Has been on Levothyroxine 125 mcg/day it appears since at least Dec 13, 2021. See Dr Templeton's discharge note from Dec 17,2021 which discusses thyroid labs/dosing at that time. On June 9, 2022 TSH = 54.4.   Check free T4. Will get evaluation from Internal Medicine regarding thyroid studies/levothyroxine dosing.  June 11 - levothyroxine increased to 150mcg daily-continue this dose and she will need follow  up TSH in 4-6 weeks from June 11 as an outpatient  July 5-repeat TSH and T4 ordered for Monday, July 11     #CAD  ASA held in setting of ICH - restart aspirin 81 mg daily on May 20, 2022 per Neurology     #Depression   Sertraline  June 28-sertraline dose increased  July 5-reviewed with psychiatry service-continue present regimen    #GERD   PPI     #Restless leg syndrome  Requip     #DVT prophylaxis-SCDs ordered but patient refusing.  Per neurology note May 11-continue off anticoagulation/antiplatelet for now. Restarted ASA 81 mg on May 20.  May 16-reviewed with patient and try venous foot compression devices  May 20-also not able to tolerate venous foot compression devices.  May 25-bilateral lower extremity venous duplex negative for DVT        -  comprehensive inpatient rehabilitation program working with PT, OT, SLP for a minimum of three hours per day, five days per week. - will monitor for progress     TEAM CONF - MAY 17- BED SBA. TRANSFER MIN. 4 STAIRS MIN. GAIT 160 FEET CTG RW. SLOW TO PROCESS. BATH MIN. LBD MIN. UBD MIN. GROOMING SET UP. TOILETING MIN . COGNITION OVERALL MILD DEFICITS. VISUAL IMPAIRMENT IMPACTS SOME TESTING. ESRD-HD. ANTIBIOTICS - VANCOMYCIN 10.90 YESTERDAY.  ELOS - 1-2 WEEKS.      TEAM CONF - MAY 24 - ALWAYS SO PROCESSING AFTER EACH DIALYSIS SESSION. AT HOME WOULD GO BACK TO HOME AND SLEEP. BED MIN ASSIST. TRANSFERS MIN. GAIT 80   FEET RW CTG. 4 STAIRS CTG. TOILET TRASNFERS AND SHOWER TRANSFERS MIN CTG. BATH CTG. LBD CTG. UBD SET UP. GROOMING SET UP. TOILETING CTG.   LEFT GROIN - ADDUCTOR PAIN - There is mild joint space narrowing involving both hips symmetrically. There are also some minimal degenerative changes involving both hips. The overall appearance is similar to the study of 12/16/2021. MODERATE WORD RETRIEVAL DEFICITS. IMPAIRED VISION AFFECTS HOME MANAGEMENT TASKS. HYPOGLLYCEMIA AFTER SSI .DECREASED TO 0-7 UNITS.   ELOS - ONE WEEK.         TEAM CONF - MAY 31 - TRANSFERS CTG.  GAIT 40- FEET CTG RW LIMITED BY THIGH PAIN. TOILET TRANSFERS CTG. BATH CTG. LBD CTG. UBD SET UP. TOILETING CTG.  GROOMING SET UP. COGNITION - MODERATE WORD RETRIEVAL DEFICITS, MODERATE IMPAIRED MEMORY IMPACTED BY FATIGUE, STILL SLOW PROCESSING.  BNE (Active)  Att'n. - Mildly Imp.  Exec. Fx. - Mildly Imp., slowed processing speed  Rsng/Jgmnt - WNL  Arith - Mod Imp.  Visuospatial Skills - DNA, pt declined citing poor vision  Visual Mem. DNA  Verbal Mem. - WNL  Emot - Pt endorsed mild dysphoria and anxiousness secodnary to current inpatient  Stay.  CONTINENT BOWEL. OCCASIONAL VOID. ESRD-HD. ON INSULIN. SKIN INTACT. LIDODERM PATCH TO LEFT ADDUCTOR TENDON. COURSE OF STEROID FOR LEFT THIGH JEREMI. CONSULTED ORTHO FOR INPUT.  ELOS - POSSIBLY Thursday DEPENDING ON FURTHER EVALUATIONS.            TEAM CONF - JUNE 21 -  DECLINED THERAPY DUE TO PAIN.  AT MOST RECENT THERAPY WHEN PARTICIPATED, TRANSFERS MIN. GAIT 80 FEET MIN / CTG MIN ASSIST RW. TOILET TRANSFERS CTG. TOILETING CTG. MODERATE IMPAIRED VERBAL MEMORY. PAIN MANAGEMENT - MEDS ADJUSTED - OXYCODONE 2.5 MG Q 6 HOURS PRN, TRAMADOL 25 MG Q 4 HOURS. MUSCLE BIOPSY RESULTS PENDING. STARTING ON PREDNISONE 60 MG DAILY YESTERDAY. WILL NEED TO ADJUST INSULING, BLOOD GLUCOSE > 300 THIS AM. GASTROENTEROLOGY EVALUATING LIVER. PATIENT DECLINES LIVER BIOPSY.  HYPOTHYROID - levothyroxine increased to 150mcg daily-continue this dose and she will need follow up TSH in 4-6 weeks from June 11 as an outpatient  ELOS -   1.5 WEEKS     TEAM CONF - June 28 - BED MIN  SIT TO SUPINE, SBA SUPINE TO SIT. CAR TRANSFERS CTG. TRANSFERS MIN ASSIST. 4 STAIRS MIN. GAIT 240 FEET CTG SBA. TOILET TRANSFERS MIN. UBB SBA. LBB MIN WITH LLE. UBD SET UP. LBD MOD ASSIST LLE.   INSULIN ADJUSTED FOR ELEVATED BLOOD GLUCOSE ON STEROIDS. ON PREDNISONE 60 MG DAILY AND PLAN TO TAPER OVER NEXT MONTH . BIOPSY REPORT SENT TO OUTPATIENT RHEUMATOLOGIST YESTERDAY. ADDITIONAL STUDIES ON BIOPSY PENDING.   BLADDER SCAN 400  CC BUT ONLY 200 CC ON INTERMITTENT STRAIGHT CATH. LEFT QUAD MUSCLE BIOPSY SITE HEALING.  ESRD - HD.   ELOS - Thursday WITH HOME HEALTH PT, OT, AND NURSING FOR DIABETES AND MONITORING ON STEROIDS.             Adonis Florentino MD       During rounds, used appropriate personal protective equipment including mask and gloves.  Additional gown if indicated.  Mask used was standard procedure mask. Appropriate PPE was worn during the entire visit.  Hand hygiene was completed before and after.

## 2022-07-05 NOTE — THERAPY TREATMENT NOTE
Inpatient Rehabilitation - Occupational Therapy Treatment Note    [facility]     Patient Name: Zaina Martinez  : 1965  MRN: [id number]    Today's Date: 2022                 Admit Date: 2022         ICD-10-CM ICD-9-CM   1. Generalized weakness  R53.1 780.79   2. Diabetic muscle infarction (Formerly [facility])  E11.69 250.80    M62.20 728.89   3. Other insomnia  G47.09 780.52       Patient Active Problem List   Diagnosis   • Renal insufficiency   • Hypertensive disorder   • Hypothyroidism   • Type 2 diabetes mellitus with kidney complication, with long-term current use of insulin (Formerly [facility])   • Rheumatoid arthritis (Formerly [facility])   • Angioedema   • Esophageal dysmotility   • Anemia   • Medically noncompliant   • Myocardial infarction due to demand ischemia (Formerly [facility])   • Enteritis   • PRES (posterior reversible encephalopathy syndrome)   • Urine retention   • Klebsiella infection   • Superficial thrombophlebitis   • Generalized weakness   • ESRD (end stage renal disease) (Formerly [facility])   • CAD (coronary artery disease)   • Abnormal urinalysis   • Chronic diastolic CHF (congestive heart failure) (Formerly [facility])   • Pyelonephritis   • Calculus of gallbladder with acute on chronic cholecystitis without obstruction   • Pleural effusion on right   • Anemia due to chronic kidney disease, on chronic dialysis (Formerly [facility])   • Abnormal findings on diagnostic imaging of other specified body structures   • Acute upper respiratory infection   • Agitation   • Alkaline phosphatase raised   • Casts present in urine   • Cellulitis of toe   • Hip pain   • Community acquired pneumonia   • Depressive disorder   • Diarrhea of presumed infectious origin   • Difficult or painful urination   • Disease due to severe acute respiratory syndrome coronavirus 2 (SARS-CoV-2)   • Dyspnea   • Encounter for follow-up examination after completed treatment for conditions other than malignant neoplasm   • H/O: hypothyroidism   • Hyperlipidemia   • Hypomagnesemia   • Intractable vomiting with  nausea   • Leukocytosis   • Luetscher's syndrome   • Need for influenza vaccination   • Restless legs   • Noncompliance with treatment   • Shoulder pain   • Urinary tract infectious disease   • Metabolic encephalopathy   • Abnormal findings on diagnostic imaging of abdomen   • Status post cholecystectomy   • Hyponatremia   • Leukocytosis   • Acute metabolic encephalopathy   • Encephalopathy, toxic   • Acute CVA (cerebrovascular accident) (HCC)   • Intracranial hemorrhage (HCC)   • Stroke (HCC)   • Abnormal urinalysis   • Diabetic muscle infarction (HCC)   • Other insomnia       Past Medical History:   Diagnosis Date   • Acute CVA (cerebrovascular accident) (HCC) 5/1/2022   • Acute on chronic diastolic CHF (congestive heart failure) (HCC)    • CAD (coronary artery disease) 12/20/2021   • Diabetes (HCC)    • Disease of thyroid gland    • GERD (gastroesophageal reflux disease)    • Hyperlipidemia 11/30/2018   • Hypertension    • Rheumatoid arthritis (HCC)        Past Surgical History:   Procedure Laterality Date   • CHOLECYSTECTOMY WITH INTRAOPERATIVE CHOLANGIOGRAM N/A 1/10/2022    Procedure: Laparoscopic cholecystectomy with intraoperative cholangiogram;  Surgeon: Ramana Raygoza MD;  Location: [facility];  Service: General;  Laterality: N/A;   • EYE SURGERY     • HYSTERECTOMY     • INSERTION HEMODIALYSIS CATHETER N/A 12/6/2021    Procedure: HEMODIALYSIS CATHETER INSERTION;  Surgeon: Keli Salazar MD;  Location: [facility] 18/19;  Service: Vascular;  Laterality: N/A;   • INSERTION HEMODIALYSIS CATHETER N/A 5/3/2022    Procedure: TUNNELED CATHETER PLACEMENT;  Surgeon: Keli Salazar MD;  Location: [facility] OR;  Service: Vascular;  Laterality: N/A;   • MUSCLE BIOPSY Left 6/15/2022    Procedure: Left quadriceps muscle biopsy;  Surgeon: Marques Ma MD;  Location: [facility] OR;  Service: Neurosurgery;  Laterality: Left;             IRF OT ASSESSMENT FLOWSHEET (last 12 hours)     IRF OT  Evaluation and Treatment     Row Name 07/05/22 1200          OT Time and Intention    Document Type daily treatment  -AF     Mode of Treatment occupational therapy  -AF     Patient Effort fair  -AF     Row Name 07/05/22 1200          General Information    General Observations of Patient pt didnot d/chome on friday as planned, OT will continue to work on functional goals with ADL tasks  -AF     Existing Precautions/Restrictions fall;other (see comments)  contact precautions  -AF     Row Name 07/05/22 1200          Pain Assessment    Pretreatment Pain Rating 8/10  -AF     Posttreatment Pain Rating 9/10  -AF     Pain Location - Side/Orientation Left  -AF     Pain Location - groin  thigh  -AF     Row Name 07/05/22 1200          Cognition/Psychosocial    Affect/Mental Status (Cognition) flat/blunted affect  -AF     Behavioral Issues (Cognition) other (see comments)  tearful duing session  -AF     Orientation Status (Cognition) oriented x 3  -AF     Follows Commands (Cognition) follows one-step commands  -AF     Personal Safety Interventions fall prevention program maintained;gait belt;nonskid shoes/slippers when out of bed  -AF     Row Name 07/05/22 1200          Bathing    Crosby Level (Bathing) bathing skills;contact guard assist;minimum assist (75% patient effort);verbal cues  -AF     Assistive Device (Bathing) grab bar/tub rail;hand held shower spray hose;tub bench  -AF     Position (Bathing) supported sitting;supported standing  -AF     Row Name 07/05/22 1200          Upper Body Dressing    [place]by Level (Upper Body Dressing) upper body dressing skills;set up assistance  -AF     Position (Upper Body Dressing) supported sitting  -AF     Comment (Upper Body Dressing) seated on shower chair  -AF     Row Name 07/05/22 1200          Lower Body Dressing    C[place]by Level (Lower Body Dressing) don;doff;maximum assist (25% patient effort)  -AF     Position (Lower Body Dressing) supported sitting;supported  standing  -AF     Comment (Lower Body Dressing) w/c level  -AF     Row Name 07/05/22 1200          Grooming    Catlett Level (Grooming) grooming skills;set up  -AF     Position (Grooming) sink side;supported sitting  -AF     Set-up Assistance (Grooming) obtain supplies  -AF     Comment (Grooming) w/c level  -AF     Row Name 07/05/22 1200          Bed Mobility    Supine-Sit Catlett (Bed Mobility) standby assist;verbal cues  -AF     Assistive Device (Bed Mobility) bed rails  -AF     Row Name 07/05/22 1200          Transfer Assessment/Treatment    Comment, (Transfers) sit to  the shower with grab bar CGA and increaed time  -AF     Row Name 07/05/22 1200          Transfers    Bed-Chair Catlett (Transfers) minimum assist (75% patient effort);contact guard;verbal cues  -AF     Assistive Device (Bed-Chair Transfers) wheelchair  -AF     Catlett Level (Shower Transfer) minimum assist (75% patient effort);verbal cues;contact guard  -AF     Assistive Device (Shower Transfer) grab bar, tub/shower;tub bench;wheelchair  -AF     Row Name 07/05/22 1200          Shower Transfer    Type (Shower Transfer) stand pivot/stand step  -AF     Comment, (Shower Transfer) with increased time  -AF     Row Name 07/05/22 1200          Balance    Static Sitting Balance independent  -AF     Dynamic Sitting Balance standby assist  -AF     Static Standing Balance contact guard  with use of grabbar in shwoer  -AF     Row Name 07/05/22 1200          Positioning and Restraints    Pre-Treatment Position in bed  -AF     Post Treatment Position wheelchair  -AF     In Wheelchair sitting;call light within reach;encouraged to call for assist;exit alarm on  -AF           User Key  (r) = Recorded By, (t) = Taken By, (c) = Cosigned By    Initials Name Effective Dates    AF Tasha Helm, OTR 06/16/21 -                  Occupational Therapy Education                 Title: PT OT SLP Therapies (In Progress)     Topic: Occupational  Therapy (Resolved)     Point: ADL training (Resolved)     Description:   Instruct learner(s) on proper safety adaptation and remediation techniques during self care or transfers.   Instruct in proper use of assistive devices.              Learning Progress Summary           Patient Acceptance, E,TB,D, VU,NR by  at 6/25/2022 1434      Show all documentation for this point (2)                 Point: Home exercise program (Resolved)     Description:   Instruct learner(s) on appropriate technique for monitoring, assisting and/or progressing therapeutic exercises/activities.              Learning Progress Summary           Patient Acceptance, E,D,H, VU by AF at 6/30/2022 1544    Comment: review of HEP with hand weights and theraband      Show all documentation for this point (2)                 Point: Precautions (Resolved)     Description:   Instruct learner(s) on prescribed precautions during self-care and functional transfers.              Learning Progress Summary           Patient Acceptance, E,TB,D, VU,NR by  at 6/25/2022 1434      Show all documentation for this point (2)                 Point: Body mechanics (Resolved)     Description:   Instruct learner(s) on proper positioning and spine alignment during self-care, functional mobility activities and/or exercises.              Learning Progress Summary           Patient Acceptance, E,TB,D, VU,NR by  at 6/25/2022 1434      Show all documentation for this point (2)                             User Key     Initials Effective Dates Name Provider Type Discipline     06/16/21 -  Fany Lima, PT Physical Therapist PT     06/16/21 -  Tasha Helm OTR Occupational Therapist OT                    OT Recommendation and Plan                         Time Calculation:      Time Calculation- OT     Row Name 07/05/22 1215             Time Calculation- OT    OT Start Time 0830  -AF      OT Stop Time 0930  -AF      OT Time Calculation (min) 60 min  -AF            User  Key  (r) = Recorded By, (t) = Taken By, (c) = Cosigned By    Initials Name Provider Type    AF Tasha Helm, OTR Occupational Therapist              Therapy Charges for Today     Code Description Service Date Service Provider Modifiers Qty    88469620903 HC OT SELF CARE/MGMT/TRAIN EA 15 MIN 7/5/2022 Tasha Helm, RAMO GO 4                   RAMO Mike  7/5/2022

## 2022-07-05 NOTE — PROGRESS NOTES
[facility] Progress Note      Patient Name: Zaina Martinez  : 1965  MRN: [id number]  Primary Care Physician:  Karson Oreilly MD  Date of admission: 2022    Subjective     Interval History:   Follow up ESRD  Seen and examined.  Had dialysis yesterday with no issues.  Feels better.  No shortness of air or chest pain      Review of Systems:   As noted above    Objective     Vitals:   Temp:  [97.6 °F (36.4 °C)-98.9 °F (37.2 °C)] 98.9 °F (37.2 °C)  Heart Rate:  [55-64] 64  Resp:  [16-17] 16  BP: (125-190)/(64-80) 186/76    Intake/Output Summary (Last 24 hours) at 2022 0839  Last data filed at 2022 1756  Gross per 24 hour   Intake 250 ml   Output 2450 ml   Net -2200 ml       Physical Exam:   General: ALert NAD chronically ill  HEENT: AT/NC; periorbital edema noted  Neck: RIJ TDC  Lungs: clear to auscultation  Heart: RRR no s3 or rub. 2/6 systolic murmur  Abdomen: +bs, soft, not distended  Extremities: 1+ pitting edema bilateral extremities.  Neuro:  Moves all 4 ext.     Scheduled Meds:     aspirin, 81 mg, Oral, Daily  b complex-vitamin c-folic acid, 1 tablet, Oral, Daily  carvedilol, 25 mg, Oral, BID With Meals  cloNIDine, 0.2 mg, Oral, Q12H  epoetin brooke/brooke-epbx, 10,000 Units, Intravenous, Once per day on   gabapentin, 300 mg, Oral, BID  hydrALAZINE, 100 mg, Oral, Q8H  insulin glargine, 7 Units, Subcutaneous, QAM  insulin lispro, 0-7 Units, Subcutaneous, TID AC  levothyroxine, 150 mcg, Oral, Q AM  lidocaine, 1 patch, Transdermal, Q24H  losartan, 100 mg, Oral, Q24H  miconazole, 1 application, Topical, Q12H  pantoprazole, 40 mg, Oral, Q AM  rOPINIRole, 0.75 mg, Oral, Nightly  sertraline, 150 mg, Oral, Daily  sodium zirconium cyclosilicate, 5 g, Oral, Daily  tamsulosin, 0.4 mg, Oral, Daily      IV Meds:        Results Reviewed:   I have personally reviewed the results from the time of this admission to 2022 08:39 EDT     Results from last 7 days   Lab  Units 07/04/22  0704 07/03/22  1219 07/03/22  0927 07/02/22  0828   SODIUM mmol/L 134* 131* 135* 135*   POTASSIUM mmol/L 6.1* 7.3* 6.9* 5.3*   CHLORIDE mmol/L 99 97* 99 98   CO2 mmol/L 25.0 23.9 24.0 24.0   BUN mg/dL 33* 57* 56* 40*   CREATININE mg/dL 2.33* 3.53* 3.48* 2.50*   CALCIUM mg/dL 7.4* 7.3* 7.5* 7.6*   BILIRUBIN mg/dL 0.4  --  0.5 0.4   ALK PHOS U/L 390*  --  436* 380*   ALT (SGPT) U/L 27  --  36* 14   AST (SGOT) U/L 41*  --  64* 28   GLUCOSE mg/dL 138* 117* 165* 65       Estimated Creatinine Clearance: 25.7 mL/min (A) (by C-G formula based on SCr of 2.33 mg/dL (H)).    Results from last 7 days   Lab Units 07/04/22  0704 07/03/22  0927 07/02/22  0828   PHOSPHORUS mg/dL 5.3* 5.8* 4.6*             Results from last 7 days   Lab Units 07/04/22  0704 07/03/22  0927 07/02/22  0828 07/01/22  1827 06/30/22  0912   WBC 10*3/mm3 17.66* 23.31* 21.35* 24.21* 19.91*   HEMOGLOBIN g/dL 8.0* 8.3* 8.4* 9.4* 8.5*   PLATELETS 10*3/mm3 182 206 234 288 315             Assessment / Plan     ASSESSMENT:    1.  ESRD, secondary to diabetic and hypertensive glomerulosclerosis; usual HD schedule is MWF.  Her volume status is generous.  2.  Intracerebral bleed and altered mental status  3.  Infected TDC, s/p removal 5/1. Replaced. Concluded vancomycin with last dose given 5/26  4.  DM2    5.  Coronary artery disease  6.  Acute on chronic diastolic dysfunction  7.  Anemia of CKD, on long-acting ANDRAE as an outpatient. Trying to minimize transfusions unless Hgb less than 7. Iron panel in favor iron deficiency anemia.  8.  Hyponatremia.  Improved on dialysis  9. Hyperkalemia    PLAN:    1. Plan for hemodialysis tomorrow.  We will start [facility] as well  2. Surveillance labs.          Ruthann Palmer MD  07/05/22  08:39 EDT    [facility]  [phone]

## 2022-07-05 NOTE — THERAPY TREATMENT NOTE
Inpatient Rehabilitation - Physical Therapy Progress Note       [facility]     Patient Name: Zaina Martinez  : 1965  MRN: [id number]    Today's Date: 2022                    Admit Date: 2022      Visit Dx:     ICD-10-CM ICD-9-CM   1. Generalized weakness  R53.1 780.79   2. Diabetic muscle infarction (Formerly [facility])  E11.69 250.80    M62.20 728.89   3. Other insomnia  G47.09 780.52       Patient Active Problem List   Diagnosis   • Renal insufficiency   • Hypertensive disorder   • Hypothyroidism   • Type 2 diabetes mellitus with kidney complication, with long-term current use of insulin (Formerly [facility])   • Rheumatoid arthritis (Formerly [facility])   • Angioedema   • Esophageal dysmotility   • Anemia   • Medically noncompliant   • Myocardial infarction due to demand ischemia (Formerly [facility])   • Enteritis   • PRES (posterior reversible encephalopathy syndrome)   • Urine retention   • Klebsiella infection   • Superficial thrombophlebitis   • Generalized weakness   • ESRD (end stage renal disease) (Formerly [facility])   • CAD (coronary artery disease)   • Abnormal urinalysis   • Chronic diastolic CHF (congestive heart failure) (Formerly [facility])   • Pyelonephritis   • Calculus of gallbladder with acute on chronic cholecystitis without obstruction   • Pleural effusion on right   • Anemia due to chronic kidney disease, on chronic dialysis (Formerly [facility])   • Abnormal findings on diagnostic imaging of other specified body structures   • Acute upper respiratory infection   • Agitation   • Alkaline phosphatase raised   • Casts present in urine   • Cellulitis of toe   • Hip pain   • Community acquired pneumonia   • Depressive disorder   • Diarrhea of presumed infectious origin   • Difficult or painful urination   • Disease due to severe acute respiratory syndrome coronavirus 2 (SARS-CoV-2)   • Dyspnea   • Encounter for follow-up examination after completed treatment for conditions other than malignant neoplasm   • H/O: hypothyroidism   • Hyperlipidemia   • Hypomagnesemia   • Intractable  vomiting with nausea   • Leukocytosis   • Luetscher's syndrome   • Need for influenza vaccination   • Restless legs   • Noncompliance with treatment   • Shoulder pain   • Urinary tract infectious disease   • Metabolic encephalopathy   • Abnormal findings on diagnostic imaging of abdomen   • Status post cholecystectomy   • Hyponatremia   • Leukocytosis   • Acute metabolic encephalopathy   • Encephalopathy, toxic   • Acute CVA (cerebrovascular accident) (HCC)   • Intracranial hemorrhage (HCC)   • Stroke (HCC)   • Abnormal urinalysis   • Diabetic muscle infarction (HCC)   • Other insomnia       Past Medical History:   Diagnosis Date   • Acute CVA (cerebrovascular accident) (HCC) 5/1/2022   • Acute on chronic diastolic CHF (congestive heart failure) (HCC)    • CAD (coronary artery disease) 12/20/2021   • Diabetes (HCC)    • Disease of thyroid gland    • GERD (gastroesophageal reflux disease)    • Hyperlipidemia 11/30/2018   • Hypertension    • Rheumatoid arthritis (HCC)        Past Surgical History:   Procedure Laterality Date   • CHOLECYSTECTOMY WITH INTRAOPERATIVE CHOLANGIOGRAM N/A 1/10/2022    Procedure: Laparoscopic cholecystectomy with intraoperative cholangiogram;  Surgeon: Ramana Raygoza MD;  Location: [facility];  Service: General;  Laterality: N/A;   • EYE SURGERY     • HYSTERECTOMY     • INSERTION HEMODIALYSIS CATHETER N/A 12/6/2021    Procedure: HEMODIALYSIS CATHETER INSERTION;  Surgeon: Keli Salazar MD;  Location: [facility] 18/19;  Service: Vascular;  Laterality: N/A;   • INSERTION HEMODIALYSIS CATHETER N/A 5/3/2022    Procedure: TUNNELED CATHETER PLACEMENT;  Surgeon: Keli Salazar MD;  Location: [facility];  Service: Vascular;  Laterality: N/A;   • MUSCLE BIOPSY Left 6/15/2022    Procedure: Left quadriceps muscle biopsy;  Surgeon: Marques Ma MD;  Location: [facility];  Service: Neurosurgery;  Laterality: Left;       PT ASSESSMENT (last 12 hours)     IRF PT  Evaluation and Treatment     Row Name 07/05/22 1027          PT Time and Intention    Document Type progress note  -     Mode of Treatment individual therapy;physical therapy  -     Patient/Family/Caregiver Comments/Observations pt seated in  no acute distress  -LH     Row Name 07/05/22 1027          General Information    Patient Profile Reviewed yes  -     Existing Precautions/Restrictions fall;other (see comments)   per Dr Florentino-With suspected diabetic muscle infarction left thigh, no strenuous strengthening exercises left quadriceps or left hamstrings. Able to work on functional tasks, transfers, gait, stairs, strengthening  -LH     Row Name 07/05/22 1027          Pain Assessment    Pretreatment Pain Rating 8/10  -     Posttreatment Pain Rating 8/10  -     Pain Location - Side/Orientation Left  -     Pain Location upper  -     Pain Location - --  thigh  -LH     Row Name 07/05/22 1027          Pain Scale: Word Pre/Post-Treatment    Pain Intervention(s) Repositioned;Medication (See MAR)  -LH     Row Name 07/05/22 1027          Cognition/Psychosocial    Affect/Mental Status (Cognition) flat/blunted affect  -     Behavioral Issues (Cognition) other (see comments)  pt tearful throughout session  -     Orientation Status (Cognition) oriented x 3  -     Personal Safety Interventions fall prevention program maintained;gait belt;supervised activity;nonskid shoes/slippers when out of bed  -LH     Row Name 07/05/22 1027          Range of Motion Comprehensive    Comment, General Range of Motion RLE AROM WNL, LLE AROM grossly limited due to pain  -LH     Row Name 07/05/22 1027          Lower Extremity (Manual Muscle Testing)    Comment, MMT: Lower Extremity RLE grossly at least 4/5, pain LLE limiting full ROM on MMT  -LH     Row Name 07/05/22 1027          Left Hip (Manual Muscle Testing)    MMT, Gross Movement: Left Hip Flexion (3-/5) fair minus  -LH     Row Name 07/05/22 1027          Left Knee  (Manual Muscle Testing)    MMT, Gross Movement: Left Knee Extension (3-/5) fair minus  -     MMT, Gross Movement: Left Knee Flexion (3-/5) fair minus  -[facility] Name 07/05/22 1027          Left Ankle/Foot (Manual Muscle Testing)    MMT, Gross Movement: Left Ankle Dorsiflexion (3/5) fair  -[facility] Name 07/05/22 1027          Bed Mobility    Comment, (Bed Mobility) NT  -[facility] Name 07/05/22 1027          Transfers    Sit-Stand [place] (Transfers) moderate assist (50% patient effort);verbal cues;nonverbal cues (demo/gesture)  -     Stand-Sit [place] (Transfers) minimum assist (75% patient effort);contact guard;verbal cues;nonverbal cues (demo/gesture)  -[facility] Name 07/05/22 1027          Sit-Stand Transfer    Assistive Device (Sit-Stand Transfers) walker, front-wheeled  -[facility] Name 07/05/22 1027          Stand-Sit Transfer    Assistive Device (Stand-Sit Transfers) walker, front-wheeled  -LH     Row Name 07/05/22 1027          Gait/Stairs (Locomotion)    [place] Level (Gait) minimum assist (75% patient effort);1 person to manage equipment  2nd person following w  for safety  -     Assistive Device (Gait) walker, front-wheeled  -     Distance in Feet (Gait) 15  -LH     Pattern (Gait) step-to;step-through  -     Deviations/Abnormal Patterns (Gait) left sided deviations;antalgic;kenn decreased  -     Bilateral Gait Deviations heel strike decreased;forward flexed posture  -     Comment, (Gait/Stairs) max encouragement required for mobilization this session  -[facility] Name 07/05/22 1027          Balance    Static Sitting Balance independent  -     Position, Sitting Balance unsupported;sitting in chair;other (see comments)  in WC  -[facility] Name 07/05/22 1027          Positioning and Restraints    Pre-Treatment Position sitting in chair/recliner  -     Post Treatment Position wheelchair  -     In Wheelchair call light within reach;sitting;encouraged to call for  "assist;exit alarm on  -     Row Name 07/05/22 1027          Therapy Assessment/Plan (PT)    Rehab Potential/Prognosis (PT) good, to achieve stated therapy goals  -     Activity Limitations Related to Problem List (PT) home management activity not performed adequately or safely  -     Comment, Therapy Assessment/Plan (PT) Currently all LTGs ongoing, DC delayed as spouse has unable to come in for teaching to aide in safe transitioning to home. Currently LLE pain limiting optimal progression w skilled PT- per Dr Florentino- \"With suspected diabetic muscle infarction left thigh, no strenuous strengthening exercises left quadriceps or left hamstrings. Able to work on functional tasks, transfers, gait, stairs, strengthening\". Will continue to prepare for home as able and appropriate.  -     Row Name 07/05/22 1027          Bed Mobility Goal 1 (PT-IRF)    Activity/Assistive Device (Bed Mobility Goal 1, PT-IRF) bed mobility activities, all  -     [place] Level (Bed Mobility Goal 1, PT-IRF) contact guard required;standby assist  -     Time Frame (Bed Mobility Goal 1, PT-IRF) 2 weeks  -     Progress/Outcomes (Bed Mobility Goal 1, PT-IRF) goal ongoing  -[facility] Name 07/05/22 1027          Transfer Goal 1 (PT-IRF)    Activity/Assistive Device (Transfer Goal 1, PT-IRF) bed-to-chair/chair-to-bed  -     [place] Level (Transfer Goal 1, PT-IRF) contact guard required  -     Time Frame (Transfer Goal 1, PT-IRF) 2 weeks  -     Progress/Outcomes (Transfer Goal 1, PT-IRF) goal ongoing  -[facility] Name 07/05/22 1027          Transfer Goal 2 (PT-IRF)    Activity/Assistive Device (Transfer Goal 2, PT-IRF) car transfer  -     [place] Level (Transfer Goal 2, PT-IRF) minimum assist (75% or more patient effort)  -     Time Frame (Transfer Goal 2, PT-IRF) 2 weeks  -     Progress/Outcomes (Transfer Goal 2, PT-IRF) goal ongoing  -[facility] Name 07/05/22 1027          Gait/Walking Locomotion Goal 1 " (PT-IRF)    Activity/Assistive Device (Gait/Walking Locomotion Goal 1, PT-IRF) gait (walking locomotion);walker, rolling  -     Gait/Walking Locomotion Distance Goal 1 (PT-IRF) 160'  -     [place] Level (Gait/Walking Locomotion Goal 1, PT-IRF) contact guard required  -     Time Frame (Gait/Walking Locomotion Goal 1, PT-IRF) 2 weeks  -     Progress/Outcomes (Gait/Walking Locomotion Goal 1, PT-IRF) goal ongoing  -     Row Name 07/05/22 1027          Stairs Goal 1 (PT-IRF)    Activity/Assistive Device (Stairs Goal 1, PT-IRF) ascending stairs;descending stairs;using handrail, right  -     Number of Stairs (Stairs Goal 1, PT-IRF) 4  -     [place] Level (Stairs Goal 1, PT-IRF) minimum assist (75% or more patient effort)  -     Time Frame (Stairs Goal 1, PT-IRF) 2 weeks  -     Progress/Outcomes (Stairs Goal 1, PT-IRF) goal ongoing  -           User Key  (r) = Recorded By, (t) = Taken By, (c) = Cosigned By    Initials Name Provider Type     Liz Fu, PT Physical Therapist              Wound 06/15/22 1312 Left anterior thigh Incision (Active)   Dressing Appearance open to air 07/04/22 2330   Closure Liquid skin adhesive 07/05/22 0834   Base clean;dry 07/05/22 0834   Periwound intact;dry 07/05/22 0834   Drainage Amount none 07/05/22 0834     Physical Therapy Education                 Title: PT OT SLP Therapies (In Progress)     Topic: Physical Therapy (In Progress)     Point: Mobility training (In Progress)     Learning Progress Summary           Patient Acceptance, E, NR by  at 7/5/2022 1038    Comment: Dr Florentino precautions educated to pt   Significant Other Refuses, E, NR by  at 7/4/2022 1245    Comment:  did not show for teaching      Show all documentation for this point (33)                 Point: Home exercise program (Resolved)     Learning Progress Summary           Patient Acceptance, E,H, VU by LB at 6/29/2022 1058      Show all documentation for this point (11)                  Point: Body mechanics (Resolved)     Learning Progress Summary           Patient Acceptance, E,TB,D, VU,NR by  at 6/25/2022 1434      Show all documentation for this point (8)                 Point: Precautions (Resolved)     Learning Progress Summary           Patient Acceptance, E,TB,D, VU,NR by  at 6/25/2022 1434      Show all documentation for this point (8)                             User Key     Initials Effective Dates Name Provider Type Discipline     06/16/21 -  Fany Lima, PT Physical Therapist PT     06/16/21 -  Liz Fu, PT Physical Therapist PT     03/07/18 -  Liz Rodriguez, PTA Physical Therapist Assistant PT     06/16/21 -  Alyx Whiting, PT Physical Therapist PT                PT Recommendation and Plan                          Time Calculation:      PT Charges     Row Name 07/05/22 1036             Time Calculation    Start Time 1000  -      Stop Time 1030  -      Time Calculation (min) 30 min  -      PT Received On 07/05/22  -      PT - Next Appointment 07/06/22  -      PT Goal Re-Cert Due Date 07/12/22  -              Time Calculation- PT    Total Timed Code Minutes- PT 30 minute(s)  -            User Key  (r) = Recorded By, (t) = Taken By, (c) = Cosigned By    Initials Name Provider Type     Liz Fu, PT Physical Therapist                Therapy Charges for Today     Code Description Service Date Service Provider Modifiers Qty    [id number]  PT THERAPEUTIC ACT EA 15 MIN 7/5/2022 Liz Fu, PT GP 2            PT G-Codes  AM-PAC 6 Clicks Score (PT): 17    .Patient was wearing a face mask during this therapy encounter. Therapist used appropriate personal protective equipment including eye protection, mask, and gloves.  Mask used was standard procedure mask. Appropriate PPE was worn during the entire therapy session. Hand hygiene was completed before and after therapy session. Patient is not in enhanced droplet precautions.        Liz Fu, PT  7/5/2022

## 2022-07-06 LAB
ALBUMIN SERPL-MCNC: 2.8 G/DL (ref 3.5–5.2)
ALP SERPL-CCNC: 634 U/L (ref 39–117)
ALT SERPL W P-5'-P-CCNC: 26 U/L (ref 1–33)
ANION GAP SERPL CALCULATED.3IONS-SCNC: 12 MMOL/L (ref 5–15)
AST SERPL-CCNC: 42 U/L (ref 1–32)
BASOPHILS # BLD AUTO: 0.02 10*3/MM3 (ref 0–0.2)
BASOPHILS NFR BLD AUTO: 0.2 % (ref 0–1.5)
BILIRUB CONJ SERPL-MCNC: 0.3 MG/DL (ref 0–0.3)
BILIRUB INDIRECT SERPL-MCNC: 0.3 MG/DL
BILIRUB SERPL-MCNC: 0.6 MG/DL (ref 0–1.2)
BUN SERPL-MCNC: 41 MG/DL (ref 6–20)
BUN/CREAT SERPL: 12.3 (ref 7–25)
CALCIUM SPEC-SCNC: 7.7 MG/DL (ref 8.6–10.5)
CHLORIDE SERPL-SCNC: 98 MMOL/L (ref 98–107)
CO2 SERPL-SCNC: 21 MMOL/L (ref 22–29)
CREAT SERPL-MCNC: 3.34 MG/DL (ref 0.57–1)
DEPRECATED RDW RBC AUTO: 57.8 FL (ref 37–54)
EGFRCR SERPLBLD CKD-EPI 2021: 15.6 ML/MIN/1.73
EOSINOPHIL # BLD AUTO: 0.11 10*3/MM3 (ref 0–0.4)
EOSINOPHIL NFR BLD AUTO: 0.9 % (ref 0.3–6.2)
ERYTHROCYTE [DISTWIDTH] IN BLOOD BY AUTOMATED COUNT: 18.3 % (ref 12.3–15.4)
GLUCOSE BLDC GLUCOMTR-MCNC: 119 MG/DL (ref 70–130)
GLUCOSE BLDC GLUCOMTR-MCNC: 166 MG/DL (ref 70–130)
GLUCOSE BLDC GLUCOMTR-MCNC: 168 MG/DL (ref 70–130)
GLUCOSE BLDC GLUCOMTR-MCNC: 54 MG/DL (ref 70–130)
GLUCOSE BLDC GLUCOMTR-MCNC: 73 MG/DL (ref 70–130)
GLUCOSE SERPL-MCNC: 153 MG/DL (ref 65–99)
HCT VFR BLD AUTO: 28.4 % (ref 34–46.6)
HGB BLD-MCNC: 8.5 G/DL (ref 12–15.9)
IMM GRANULOCYTES # BLD AUTO: 0.06 10*3/MM3 (ref 0–0.05)
IMM GRANULOCYTES NFR BLD AUTO: 0.5 % (ref 0–0.5)
LYMPHOCYTES # BLD AUTO: 0.65 10*3/MM3 (ref 0.7–3.1)
LYMPHOCYTES NFR BLD AUTO: 5.2 % (ref 19.6–45.3)
MCH RBC QN AUTO: 26.1 PG (ref 26.6–33)
MCHC RBC AUTO-ENTMCNC: 29.9 G/DL (ref 31.5–35.7)
MCV RBC AUTO: 87.1 FL (ref 79–97)
MONOCYTES # BLD AUTO: 0.75 10*3/MM3 (ref 0.1–0.9)
MONOCYTES NFR BLD AUTO: 6 % (ref 5–12)
NEUTROPHILS NFR BLD AUTO: 10.92 10*3/MM3 (ref 1.7–7)
NEUTROPHILS NFR BLD AUTO: 87.2 % (ref 42.7–76)
NRBC BLD AUTO-RTO: 0 /100 WBC (ref 0–0.2)
PHOSPHATE SERPL-MCNC: 5.7 MG/DL (ref 2.5–4.5)
PLATELET # BLD AUTO: 214 10*3/MM3 (ref 140–450)
PMV BLD AUTO: 10.5 FL (ref 6–12)
POTASSIUM SERPL-SCNC: 6.1 MMOL/L (ref 3.5–5.2)
PROT SERPL-MCNC: 5.8 G/DL (ref 6–8.5)
RBC # BLD AUTO: 3.26 10*6/MM3 (ref 3.77–5.28)
SODIUM SERPL-SCNC: 131 MMOL/L (ref 136–145)
WBC NRBC COR # BLD: 12.51 10*3/MM3 (ref 3.4–10.8)

## 2022-07-06 PROCEDURE — 25010000002 EPOETIN ALFA-EPBX 10000 UNIT/ML SOLUTION: Performed by: PHYSICAL MEDICINE & REHABILITATION

## 2022-07-06 PROCEDURE — 80076 HEPATIC FUNCTION PANEL: CPT | Performed by: PHYSICAL MEDICINE & REHABILITATION

## 2022-07-06 PROCEDURE — 84100 ASSAY OF PHOSPHORUS: CPT | Performed by: INTERNAL MEDICINE

## 2022-07-06 PROCEDURE — 97535 SELF CARE MNGMENT TRAINING: CPT

## 2022-07-06 PROCEDURE — 80048 BASIC METABOLIC PNL TOTAL CA: CPT | Performed by: INTERNAL MEDICINE

## 2022-07-06 PROCEDURE — 63710000001 INSULIN LISPRO (HUMAN) PER 5 UNITS: Performed by: HOSPITALIST

## 2022-07-06 PROCEDURE — 82962 GLUCOSE BLOOD TEST: CPT

## 2022-07-06 PROCEDURE — 97110 THERAPEUTIC EXERCISES: CPT

## 2022-07-06 PROCEDURE — 85025 COMPLETE CBC W/AUTO DIFF WBC: CPT | Performed by: PHYSICAL MEDICINE & REHABILITATION

## 2022-07-06 RX ORDER — ZOLPIDEM TARTRATE 5 MG/1
2.5 TABLET ORAL NIGHTLY PRN
Status: DISCONTINUED | OUTPATIENT
Start: 2022-07-06 | End: 2022-07-22 | Stop reason: HOSPADM

## 2022-07-06 RX ADMIN — LEVOTHYROXINE SODIUM 150 MCG: 0.15 TABLET ORAL at 05:31

## 2022-07-06 RX ADMIN — CARVEDILOL 25 MG: 25 TABLET, FILM COATED ORAL at 08:32

## 2022-07-06 RX ADMIN — SODIUM ZIRCONIUM CYCLOSILICATE 5 G: 5 POWDER, FOR SUSPENSION ORAL at 08:32

## 2022-07-06 RX ADMIN — OXYCODONE 5 MG: 5 TABLET ORAL at 07:07

## 2022-07-06 RX ADMIN — DIPHENOXYLATE HYDROCHLORIDE AND ATROPINE SULFATE 1 TABLET: 2.5; .025 TABLET ORAL at 18:17

## 2022-07-06 RX ADMIN — LIDOCAINE 1 PATCH: 50 PATCH CUTANEOUS at 08:31

## 2022-07-06 RX ADMIN — MICONAZOLE NITRATE 1 APPLICATION: 2 CREAM TOPICAL at 22:24

## 2022-07-06 RX ADMIN — DIPHENOXYLATE HYDROCHLORIDE AND ATROPINE SULFATE 1 TABLET: 2.5; .025 TABLET ORAL at 21:16

## 2022-07-06 RX ADMIN — HYDRALAZINE HYDROCHLORIDE 100 MG: 50 TABLET, FILM COATED ORAL at 05:30

## 2022-07-06 RX ADMIN — CLONIDINE HYDROCHLORIDE 0.2 MG: 0.1 TABLET ORAL at 21:12

## 2022-07-06 RX ADMIN — INSULIN GLARGINE-YFGN 7 UNITS: 100 INJECTION, SOLUTION SUBCUTANEOUS at 08:28

## 2022-07-06 RX ADMIN — MICONAZOLE NITRATE 1 APPLICATION: 2 CREAM TOPICAL at 08:34

## 2022-07-06 RX ADMIN — GABAPENTIN 300 MG: 300 CAPSULE ORAL at 08:31

## 2022-07-06 RX ADMIN — Medication 1 TABLET: at 08:32

## 2022-07-06 RX ADMIN — EPOETIN ALFA-EPBX 10000 UNITS: 10000 INJECTION, SOLUTION INTRAVENOUS; SUBCUTANEOUS at 14:37

## 2022-07-06 RX ADMIN — TAMSULOSIN HYDROCHLORIDE 0.4 MG: 0.4 CAPSULE ORAL at 08:32

## 2022-07-06 RX ADMIN — OXYCODONE 5 MG: 5 TABLET ORAL at 17:52

## 2022-07-06 RX ADMIN — INSULIN LISPRO 2 UNITS: 100 INJECTION, SOLUTION INTRAVENOUS; SUBCUTANEOUS at 11:31

## 2022-07-06 RX ADMIN — SERTRALINE 150 MG: 100 TABLET, FILM COATED ORAL at 08:32

## 2022-07-06 RX ADMIN — HYDRALAZINE HYDROCHLORIDE 100 MG: 50 TABLET, FILM COATED ORAL at 22:17

## 2022-07-06 RX ADMIN — CARVEDILOL 25 MG: 25 TABLET, FILM COATED ORAL at 17:47

## 2022-07-06 RX ADMIN — OXYCODONE 5 MG: 5 TABLET ORAL at 11:31

## 2022-07-06 RX ADMIN — GABAPENTIN 300 MG: 300 CAPSULE ORAL at 21:13

## 2022-07-06 RX ADMIN — PANTOPRAZOLE SODIUM 40 MG: 40 TABLET, DELAYED RELEASE ORAL at 05:30

## 2022-07-06 RX ADMIN — GABAPENTIN 100 MG: 100 CAPSULE ORAL at 17:47

## 2022-07-06 RX ADMIN — ZOLPIDEM TARTRATE 2.5 MG: 5 TABLET ORAL at 21:16

## 2022-07-06 RX ADMIN — OXYCODONE 5 MG: 5 TABLET ORAL at 03:00

## 2022-07-06 RX ADMIN — ROPINIROLE HYDROCHLORIDE 0.75 MG: 0.5 TABLET, FILM COATED ORAL at 21:13

## 2022-07-06 RX ADMIN — ASPIRIN 81 MG: 81 TABLET, COATED ORAL at 08:31

## 2022-07-06 RX ADMIN — LOSARTAN POTASSIUM 100 MG: 100 TABLET, FILM COATED ORAL at 08:32

## 2022-07-06 RX ADMIN — CLONIDINE HYDROCHLORIDE 0.2 MG: 0.1 TABLET ORAL at 08:32

## 2022-07-06 RX ADMIN — OXYCODONE 5 MG: 5 TABLET ORAL at 22:17

## 2022-07-06 NOTE — PLAN OF CARE
Goal Outcome Evaluation:  Plan of Care Reviewed With: patient        Progress: improving   Patient is calm and cooperative. Alert and oriented. Forgetful. Complaining of left groin and left inner thigh. PRN pain medication given with positive result. She is using the call light for assistance. Generalized weakness. 1 person assist. Blood sugar this morning was 119. Scheduled Lantus given. Blood sugar at lunchtime was 166. 2 units of insulin per sliding scale given. She participated in therapy. Potassium level elevated. Dialysis department aware of lab result. Patient went to dialysis this afternoon after therapy. Will continue to monitor.

## 2022-07-06 NOTE — THERAPY TREATMENT NOTE
Inpatient Rehabilitation - Occupational Therapy Treatment Note    [facility]     Patient Name: Zaina Martinez  : 1965  MRN: [id number]    Today's Date: 2022                 Admit Date: 2022         ICD-10-CM ICD-9-CM   1. Generalized weakness  R53.1 780.79   2. Diabetic muscle infarction ([facility])  E11.69 250.80    M62.20 728.89   3. Other insomnia  G47.09 780.52       Patient Active Problem List   Diagnosis   • Renal insufficiency   • Hypertensive disorder   • Hypothyroidism   • Type 2 diabetes mellitus with kidney complication, with long-term current use of insulin ([facility])   • Rheumatoid arthritis ([facility])   • Angioedema   • Esophageal dysmotility   • Anemia   • Medically noncompliant   • Myocardial infarction due to demand ischemia ([facility])   • Enteritis   • PRES (posterior reversible encephalopathy syndrome)   • Urine retention   • Klebsiella infection   • Superficial thrombophlebitis   • Generalized weakness   • ESRD (end stage renal disease) ([facility])   • CAD (coronary artery disease)   • Abnormal urinalysis   • Chronic diastolic CHF (congestive heart failure) ([facility])   • Pyelonephritis   • Calculus of gallbladder with acute on chronic cholecystitis without obstruction   • Pleural effusion on right   • Anemia due to chronic kidney disease, on chronic dialysis ([facility])   • Abnormal findings on diagnostic imaging of other specified body structures   • Acute upper respiratory infection   • Agitation   • Alkaline phosphatase raised   • Casts present in urine   • Cellulitis of toe   • Hip pain   • Community acquired pneumonia   • Depressive disorder   • Diarrhea of presumed infectious origin   • Difficult or painful urination   • Disease due to severe acute respiratory syndrome coronavirus 2 (SARS-CoV-2)   • Dyspnea   • Encounter for follow-up examination after completed treatment for conditions other than malignant neoplasm   • H/O: hypothyroidism   • Hyperlipidemia   • Hypomagnesemia   • Intractable vomiting with  nausea   • Leukocytosis   • Luetscher's syndrome   • Need for influenza vaccination   • Restless legs   • Noncompliance with treatment   • Shoulder pain   • Urinary tract infectious disease   • Metabolic encephalopathy   • Abnormal findings on diagnostic imaging of abdomen   • Status post cholecystectomy   • Hyponatremia   • Leukocytosis   • Acute metabolic encephalopathy   • Encephalopathy, toxic   • Acute CVA (cerebrovascular accident) (HCC)   • Intracranial hemorrhage (HCC)   • Stroke (HCC)   • Abnormal urinalysis   • Diabetic muscle infarction (HCC)   • Other insomnia       Past Medical History:   Diagnosis Date   • Acute CVA (cerebrovascular accident) (HCC) 5/1/2022   • Acute on chronic diastolic CHF (congestive heart failure) (HCC)    • CAD (coronary artery disease) 12/20/2021   • Diabetes (HCC)    • Disease of thyroid gland    • GERD (gastroesophageal reflux disease)    • Hyperlipidemia 11/30/2018   • Hypertension    • Rheumatoid arthritis (HCC)        Past Surgical History:   Procedure Laterality Date   • CHOLECYSTECTOMY WITH INTRAOPERATIVE CHOLANGIOGRAM N/A 1/10/2022    Procedure: Laparoscopic cholecystectomy with intraoperative cholangiogram;  Surgeon: Ramana Raygoza MD;  Location: [facility];  Service: General;  Laterality: N/A;   • EYE SURGERY     • HYSTERECTOMY     • INSERTION HEMODIALYSIS CATHETER N/A 12/6/2021    Procedure: HEMODIALYSIS CATHETER INSERTION;  Surgeon: Keli Salazar MD;  Location: [facility] 18/19;  Service: Vascular;  Laterality: N/A;   • INSERTION HEMODIALYSIS CATHETER N/A 5/3/2022    Procedure: TUNNELED CATHETER PLACEMENT;  Surgeon: Keli Salazar MD;  Location: [facility] OR;  Service: Vascular;  Laterality: N/A;   • MUSCLE BIOPSY Left 6/15/2022    Procedure: Left quadriceps muscle biopsy;  Surgeon: Marques Ma MD;  Location: [facility] OR;  Service: Neurosurgery;  Laterality: Left;             IRF OT ASSESSMENT FLOWSHEET (last 12 hours)     IRF OT  Evaluation and Treatment     Row Name 07/06/22 1216          OT Time and Intention    Document Type daily treatment  -AF     Mode of Treatment occupational therapy  -AF     Patient Effort fair  -AF     Row Name 07/06/22 1216          General Information    Patient/Family/Caregiver Comments/Observations pt sitting up in bed  -AF     General Observations of Patient discussed with patient what she feels to go  home she just needs to get her leg stronger  -AF     Existing Precautions/Restrictions fall;other (see comments)  contact precautions  -AF     Row Name 07/06/22 1216          Pain Assessment    Pretreatment Pain Rating 7/10  -AF     Posttreatment Pain Rating 8/10  -AF     Pain Location - Side/Orientation Left  -AF     Pain Location - groin  -AF     Row Name 07/06/22 1216          Cognition/Psychosocial    Affect/Mental Status (Cognition) flat/blunted affect  -AF     Orientation Status (Cognition) oriented x 3  -AF     Follows Commands (Cognition) follows one-step commands  -AF     Personal Safety Interventions fall prevention program maintained;gait belt;nonskid shoes/slippers when out of bed  -AF     Row Name 07/06/22 1216          Bathing    [place] Level (Bathing) bathing skills;upper body;contact guard assist;lower body;moderate assist (50% patient effort)  -AF     Position (Bathing) sink side;supported sitting;supported standing  -AF     Set-up Assistance (Bathing) obtain supplies  -AF     Row Name 07/06/22 1216          Upper Body Dressing    [place] Level (Upper Body Dressing) upper body dressing skills;set up assistance  -AF     Position (Upper Body Dressing) supported sitting  -AF     Set-up Assistance (Upper Body Dressing) obtain clothing  -AF     Row Name 07/06/22 1216          Lower Body Dressing    [place] Level (Lower Body Dressing) doff;don;pants/bottoms;shoes/slippers;socks;dependent (less than 25% patient effort);maximum assist (25% patient effort)  -AF     Position (Lower Body  Dressing) supported sitting;supported standing  -AF     Set-up Assistance (Lower Body Dressing) obtain clothing  -AF     Comment (Lower Body Dressing) w/c level  -AF     Row Name 07/06/22 1216          Grooming    [place] Level (Grooming) grooming skills;set up  -AF     Position (Grooming) sink side;supported sitting  -AF     Set-up Assistance (Grooming) obtain supplies  -AF     Comment (Grooming) w/c level  -AF     Row Name 07/06/22 1216          Toileting    Comment (Toileting) change brief with ADl tasks  -AF     Row Name 07/06/22 1216          Bed Mobility    Supine-Sit [place] (Bed Mobility) standby assist;verbal cues  -AF     Assistive Device (Bed Mobility) bed rails  -AF     Row Name 07/06/22 1216          Transfer Assessment/Treatment    Comment, (Transfers) sit to stand with ADL tasks to sink level MIN A  -AF     Row Name 07/06/22 1216          Transfers    Bed-Chair [place] (Transfers) minimum assist (75% patient effort);verbal cues  -AF     Assistive Device (Bed-Chair Transfers) wheelchair;walker, front-wheeled  -AF     Row Name 07/06/22 1216          Bed-Chair Transfer    Comment, (Bed-Chair Transfer) increased time  -AF     Row Name 07/06/22 1216          Balance    Static Sitting Balance independent  -AF     Static Standing Balance contact guard  -AF     Row Name 07/06/22 1216          Positioning and Restraints    Pre-Treatment Position in bed  -AF     Post Treatment Position wheelchair  -AF     In Wheelchair sitting;call light within reach;encouraged to call for assist;exit alarm on  -AF           User Key  (r) = Recorded By, (t) = Taken By, (c) = Cosigned By    Initials Name Effective Dates    AF Tasha Helm, OTR 06/16/21 -                  Occupational Therapy Education                 Title: PT OT SLP Therapies (In Progress)     Topic: Occupational Therapy (Resolved)     Point: ADL training (Resolved)     Description:   Instruct learner(s) on proper safety adaptation and  remediation techniques during self care or transfers.   Instruct in proper use of assistive devices.              Learning Progress Summary           Patient Acceptance, E,TB,D, VU,NR by  at 6/25/2022 1434      Show all documentation for this point (2)                 Point: Home exercise program (Resolved)     Description:   Instruct learner(s) on appropriate technique for monitoring, assisting and/or progressing therapeutic exercises/activities.              Learning Progress Summary           Patient Acceptance, E,D,H, VU by AF at 6/30/2022 1544    Comment: review of HEP with hand weights and theraband      Show all documentation for this point (2)                 Point: Precautions (Resolved)     Description:   Instruct learner(s) on prescribed precautions during self-care and functional transfers.              Learning Progress Summary           Patient Acceptance, E,TB,D, VU,NR by  at 6/25/2022 1434      Show all documentation for this point (2)                 Point: Body mechanics (Resolved)     Description:   Instruct learner(s) on proper positioning and spine alignment during self-care, functional mobility activities and/or exercises.              Learning Progress Summary           Patient Acceptance, E,TB,D, VU,NR by  at 6/25/2022 1434      Show all documentation for this point (2)                             User Key     Initials Effective Dates Name Provider Type Discipline     06/16/21 -  Fany Lima, PT Physical Therapist PT     06/16/21 -  Tasha Helm OTJANICE Occupational Therapist OT                    OT Recommendation and Plan                         Time Calculation:      Time Calculation- OT     Row Name 07/06/22 1221             Time Calculation- OT    OT Start Time 0830  -AF      OT Stop Time 0930  -AF      OT Time Calculation (min) 60 min  -AF            User Key  (r) = Recorded By, (t) = Taken By, (c) = Cosigned By    Initials Name Provider Type    AF Tasha Helm, OTJANICE  Occupational Therapist              Therapy Charges for Today     Code Description Service Date Service Provider Modifiers Qty    13097617139 HC OT SELF CARE/MGMT/TRAIN EA 15 MIN 7/5/2022 Tasha Helm, OTR GO 4    35416756378 HC OT THER PROC EA 15 MIN 7/5/2022 Tasha Helm, OTR GO 1    93922424642 HC OT SELF CARE/MGMT/TRAIN EA 15 MIN 7/5/2022 Tasha Helm, OTR GO 1    14829676227 HC OT SELF CARE/MGMT/TRAIN EA 15 MIN 7/6/2022 Tasha Helm, OTR GO 4                   RAMO Mike  7/6/2022

## 2022-07-06 NOTE — PROGRESS NOTES
[facility] Progress Note      Patient Name: Zaina Martinez  : 1965  MRN: [id number]  Primary Care Physician:  Karson Oreilly MD  Date of admission: 2022    Subjective     Interval History:   Follow up ESRD  Seen and examined.  Had dialysis yesterday with no issues.  Feels better.  No shortness of air or chest pain      Review of Systems:   As noted above    Objective     Vitals:   Temp:  [97.9 °F (36.6 °C)-98.9 °F (37.2 °C)] 98.1 °F (36.7 °C)  Heart Rate:  [54-60] 57  Resp:  [16-18] 18  BP: (107-184)/(58-80) 174/75    Intake/Output Summary (Last 24 hours) at 2022 1324  Last data filed at 2022 1204  Gross per 24 hour   Intake 680 ml   Output --   Net 680 ml       Physical Exam:   General: ALert NAD chronically ill  HEENT: AT/NC; periorbital edema noted  Neck: RIJ TDC  Lungs: clear to auscultation  Heart: RRR no s3 or rub. 2/6 systolic murmur  Abdomen: +bs, soft, not distended  Extremities: 1+ pitting edema bilateral extremities.  Neuro:  Moves all 4 ext.     Scheduled Meds:     aspirin, 81 mg, Oral, Daily  b complex-vitamin c-folic acid, 1 tablet, Oral, Daily  carvedilol, 25 mg, Oral, BID With Meals  cloNIDine, 0.2 mg, Oral, Q12H  epoetin brooke/brooke-epbx, 10,000 Units, Intravenous, Once per day on   gabapentin, 100 mg, Oral, Q24H  gabapentin, 300 mg, Oral, BID  hydrALAZINE, 100 mg, Oral, Q8H  insulin glargine, 7 Units, Subcutaneous, QAM  insulin lispro, 0-7 Units, Subcutaneous, TID AC  levothyroxine, 150 mcg, Oral, Q AM  lidocaine, 1 patch, Transdermal, Q24H  losartan, 100 mg, Oral, Q24H  miconazole, 1 application, Topical, Q12H  pantoprazole, 40 mg, Oral, Q AM  rOPINIRole, 0.75 mg, Oral, Nightly  sertraline, 150 mg, Oral, Daily  sodium zirconium cyclosilicate, 5 g, Oral, Daily  tamsulosin, 0.4 mg, Oral, Daily      IV Meds:        Results Reviewed:   I have personally reviewed the results from the time of this admission to 2022 13:24 EDT     Results  from last 7 days   Lab Units 07/06/22  1135 07/05/22  0949 07/04/22  0704 07/03/22  1219 07/03/22  0927   SODIUM mmol/L 131* 133* 134*   < > 135*   POTASSIUM mmol/L 6.1* 5.1 6.1*   < > 6.9*   CHLORIDE mmol/L 98 97* 99   < > 99   CO2 mmol/L 21.0* 25.0 25.0   < > 24.0   BUN mg/dL 41* 28* 33*   < > 56*   CREATININE mg/dL 3.34* 2.45* 2.33*   < > 3.48*   CALCIUM mg/dL 7.7* 7.7* 7.4*   < > 7.5*   BILIRUBIN mg/dL  --  0.5 0.4  --  0.5   ALK PHOS U/L  --  533* 390*  --  436*   ALT (SGPT) U/L  --  26 27  --  36*   AST (SGOT) U/L  --  35* 41*  --  64*   GLUCOSE mg/dL 153* 189* 138*   < > 165*    < > = values in this interval not displayed.       Estimated Creatinine Clearance: 17.9 mL/min (A) (by C-G formula based on SCr of 3.34 mg/dL (H)).    Results from last 7 days   Lab Units 07/06/22  1135 07/05/22  0949 07/04/22  0704   PHOSPHORUS mg/dL 5.7* 4.4 5.3*             Results from last 7 days   Lab Units 07/06/22  1135 07/05/22  0949 07/04/22  0704 07/03/22  0927 07/02/22  0828   WBC 10*3/mm3 12.51* 15.65* 17.66* 23.31* 21.35*   HEMOGLOBIN g/dL 8.5* 8.4* 8.0* 8.3* 8.4*   PLATELETS 10*3/mm3 214 204 182 206 234             Assessment / Plan     ASSESSMENT:    1.  ESRD, secondary to diabetic and hypertensive glomerulosclerosis; usual HD schedule is MW.  Her volume status is generous.  2.  Intracerebral bleed and altered mental status  3.  Infected TDC, s/p removal 5/1. Replaced. Concluded vancomycin with last dose given 5/26  4.  DM2    5.  Coronary artery disease  6.  Acute on chronic diastolic dysfunction  7.  Anemia of CKD, on long-acting ANDRAE as an outpatient. Trying to minimize transfusions unless Hgb less than 7. Iron panel in favor iron deficiency anemia.  8.  Hyponatremia.  Improved on dialysis  9. Hyperkalemia    PLAN:    1. Plan for hemodialysis today.  Continue Lokelma as well with persistent hyperkalemia  2. Surveillance labs.          Ruthann Palmer MD  07/06/22  13:24 EDT    [facility]  [phone]

## 2022-07-06 NOTE — PROGRESS NOTES
Inpatient Rehabilitation Plan of Care Note    Plan of Care  Care Plan Reviewed - No updates at this time.    Body Systems    [RN] Genitourinary(Active)  Current Status(07/06/2022): Patient is a M,W,F HD patient with a tunneled  catheter to the R chest. Pt produces little urine.  pt oliguric. New cath order  6/20: straight cath daily and prn until cath residual < 300 ml x3 days.  Weekly Goal(07/14/2022): Pt will plan to transition to community dialysis.  Discharge Goal: Pt will transition to community dialysis.    [RN] Endocrine(Active)  Current Status(07/06/2022): Patient is a diabetic.  Weekly Goal(07/14/2022): Blood glucose will be controlled.  Discharge Goal: Patient will have medication regimen that she can manage at  home.    Performed Intervention(s)  Dialysis mwf  Monitor weight and I/O.  DM educator consult.  Accuchecks as ordered.      Pain    [RN] Pain Management(Active)  Current Status(07/06/2022): Pt has consistent pain to L groin/L medial upper  thigh . Muscle biopsy site to L thigh .  Weekly Goal(07/14/2022): Pain will be controlled.  Discharge Goal: Pt will have pain management regimen that she can follow at  home.    Performed Intervention(s)  Pain meds prn .  Ice to LLE per order      Psychosocial    [RN] Coping/Adjustment(Active)  Current Status(07/06/2022): Pt is A/Ox4. Pt is at increased risk for reduced  coping r/t hospitalization and comorbidities. Pt has been calling for assistance  appropriately as needed. Pt has supportive family.  Weekly Goal(07/14/2022): Pt will verbalize needs, feelings, concerns to staff as  needed.  Discharge Goal: Pt will verbalize adequate coping strategies to use at home.    Performed Intervention(s)  Provide distraction and/or relaxation as needed.  Allow extra time for patient to verbalize needs, concerns, feelings, etc.      Safety    [RN] Potential for Injury(Active)  Current Status(07/06/2022): Patient is at increased risk for falls/injury r/t  hospitalization  and generalized weakness.  Weekly Goal(07/14/2022): Patient will call appropriately for assistance as  needed.  Discharge Goal: Patient will remain free from falls/injury.    Performed Intervention(s)  Fall precautions: bed low and locked, chair alarm and bed alarms in use, nonskid  socks and gait belt in use, call light and personal belongings within reach.  Hourly rounding.      Sphincter Control    [RN] Bowel and bladder (Active)  Current Status(07/06/2022): Patient can be continent and is often incontinent of  stool. Patient is oliguric and is an HD patient. New cath order 6/20.  Weekly Goal(07/14/2022): Pt will be continent 75% of the time. Patient will have  BM at least q3 days or within normal patterns.  Discharge Goal: Patient will be continent 100% of the time.    Performed Intervention(s)  Monitor I/O.  Bowel meds prn.    Signed by: Chrissy Cobb RN

## 2022-07-06 NOTE — THERAPY TREATMENT NOTE
Inpatient Rehabilitation - Physical Therapy Treatment Note       [facility]     Patient Name: Zaina Martinez  : 1965  MRN: [id number]    Today's Date: 2022                    Admit Date: 2022      Visit Dx:     ICD-10-CM ICD-9-CM   1. Generalized weakness  R53.1 780.79   2. Diabetic muscle infarction (Formerly [facility])  E11.69 250.80    M62.20 728.89   3. Other insomnia  G47.09 780.52       Patient Active Problem List   Diagnosis   • Renal insufficiency   • Hypertensive disorder   • Hypothyroidism   • Type 2 diabetes mellitus with kidney complication, with long-term current use of insulin (Formerly [facility])   • Rheumatoid arthritis (Formerly [facility])   • Angioedema   • Esophageal dysmotility   • Anemia   • Medically noncompliant   • Myocardial infarction due to demand ischemia (Formerly [facility])   • Enteritis   • PRES (posterior reversible encephalopathy syndrome)   • Urine retention   • Klebsiella infection   • Superficial thrombophlebitis   • Generalized weakness   • ESRD (end stage renal disease) (Formerly [facility])   • CAD (coronary artery disease)   • Abnormal urinalysis   • Chronic diastolic CHF (congestive heart failure) (Formerly [facility])   • Pyelonephritis   • Calculus of gallbladder with acute on chronic cholecystitis without obstruction   • Pleural effusion on right   • Anemia due to chronic kidney disease, on chronic dialysis (Formerly [facility])   • Abnormal findings on diagnostic imaging of other specified body structures   • Acute upper respiratory infection   • Agitation   • Alkaline phosphatase raised   • Casts present in urine   • Cellulitis of toe   • Hip pain   • Community acquired pneumonia   • Depressive disorder   • Diarrhea of presumed infectious origin   • Difficult or painful urination   • Disease due to severe acute respiratory syndrome coronavirus 2 (SARS-CoV-2)   • Dyspnea   • Encounter for follow-up examination after completed treatment for conditions other than malignant neoplasm   • H/O: hypothyroidism   • Hyperlipidemia   • Hypomagnesemia   • Intractable  vomiting with nausea   • Leukocytosis   • Luetscher's syndrome   • Need for influenza vaccination   • Restless legs   • Noncompliance with treatment   • Shoulder pain   • Urinary tract infectious disease   • Metabolic encephalopathy   • Abnormal findings on diagnostic imaging of abdomen   • Status post cholecystectomy   • Hyponatremia   • Leukocytosis   • Acute metabolic encephalopathy   • Encephalopathy, toxic   • Acute CVA (cerebrovascular accident) (HCC)   • Intracranial hemorrhage (HCC)   • Stroke (HCC)   • Abnormal urinalysis   • Diabetic muscle infarction (HCC)   • Other insomnia       Past Medical History:   Diagnosis Date   • Acute CVA (cerebrovascular accident) (HCC) 5/1/2022   • Acute on chronic diastolic CHF (congestive heart failure) (HCC)    • CAD (coronary artery disease) 12/20/2021   • Diabetes (HCC)    • Disease of thyroid gland    • GERD (gastroesophageal reflux disease)    • Hyperlipidemia 11/30/2018   • Hypertension    • Rheumatoid arthritis (HCC)        Past Surgical History:   Procedure Laterality Date   • CHOLECYSTECTOMY WITH INTRAOPERATIVE CHOLANGIOGRAM N/A 1/10/2022    Procedure: Laparoscopic cholecystectomy with intraoperative cholangiogram;  Surgeon: Ramana Raygoza MD;  Location: [facility];  Service: General;  Laterality: N/A;   • EYE SURGERY     • HYSTERECTOMY     • INSERTION HEMODIALYSIS CATHETER N/A 12/6/2021    Procedure: HEMODIALYSIS CATHETER INSERTION;  Surgeon: Keli Salazar MD;  Location: [facility] 18/19;  Service: Vascular;  Laterality: N/A;   • INSERTION HEMODIALYSIS CATHETER N/A 5/3/2022    Procedure: TUNNELED CATHETER PLACEMENT;  Surgeon: Keli Salazar MD;  Location: [facility];  Service: Vascular;  Laterality: N/A;   • MUSCLE BIOPSY Left 6/15/2022    Procedure: Left quadriceps muscle biopsy;  Surgeon: Marques Ma MD;  Location: [facility];  Service: Neurosurgery;  Laterality: Left;       PT ASSESSMENT (last 12 hours)     IR[facility] PT  Evaluation and Treatment     Row Name 07/06/22 1019          PT Time and Intention    Document Type daily treatment  -LB     Mode of Treatment physical therapy  -LB     Patient/Family/Caregiver Comments/Observations Pnt seated in w/c.  -LB     Row Name 07/06/22 1019          General Information    General Observations of Patient Lethargic.Moving slowly  -LB     Existing Precautions/Restrictions fall;other (see comments)  contact precautions  -LB     Comment, General Information speaking softly. Slow to respond  -LB     Row Name 07/06/22 1019          Pain Assessment    Pretreatment Pain Rating 7/10  -LB     Pain Location - Side/Orientation Left  -LB     Pain Location - groin;other (see comments)  thigh  -LB     Pre/Posttreatment Pain Comment premedicated. Amb  -LB     Row Name 07/06/22 1019          Bed Mobility    Comment, (Bed Mobility) Nt. Up in w/c.  -LB     Row Name 07/06/22 1019          Transfers    Sit-Stand [place] (Transfers) moderate assist (50% patient effort)  -LB     Stand-Sit [place] (Transfers) minimum assist (75% patient effort)  -LB     Row Name 07/06/22 1019          Sit-Stand Transfer    Assistive Device (Sit-Stand Transfers) walker, front-wheeled;wheelchair  -LB     Row Name 07/06/22 1019          Stand-Sit Transfer    Assistive Device (Stand-Sit Transfers) walker, front-wheeled;wheelchair  -LB     Row Name 07/06/22 1019          Car Transfer    Comment, (Car Transfer) declined  -LB     Row Name 07/06/22 1019          Gait/Stairs (Locomotion)    [place] Level (Gait) minimum assist (75% patient effort);1 person to manage equipment  helper followed w w/c  -LB     Assistive Device (Gait) walker, front-wheeled  -LB     Distance in Feet (Gait) 10, 20 [place]. In PM: 24'  -LB     Pattern (Gait) step-to;step-through  -LB     Deviations/Abnormal Patterns (Gait) antalgic;gait speed decreased;kenn decreased;stride length decreased;weight shifting decreased  -LB     Bilateral Gait Deviations  heel strike decreased;forward flexed posture  -LB     Comment, (Gait/Stairs) moves slowly  -LB     Row Name 07/06/22 1019          Wheelchair Mobility/Management    Method of Wheelchair Locomotion (Mobility) bimanual (upper extremity) propulsion  -LB     Forward Propulsion [place] (Wheelchair) minimum assist (75% patient effort);verbal cues  -LB     Steering [place] (Wheelchair) minimum assist (75% patient effort);verbal cues  -LB     Turning [place] (Wheelchair) minimum assist (75% patient effort);verbal cues  -LB     Distance Propelled in Feet (Wheelchair) 80  -LB     Row Name 07/06/22 1019          Hip (Therapeutic Exercise)    Hip Isometrics (Therapeutic Exercise) bilateral;gluteal sets;sitting  -LB     Row Name 07/06/22 1019          Knee (Therapeutic Exercise)    Knee AROM (Therapeutic Exercise) bilateral;LAQ (long arc quad);flexion;sitting;10 repetitions  -LB     Row Name 07/06/22 1019          Ankle (Therapeutic Exercise)    Ankle AROM (Therapeutic Exercise) bilateral;dorsiflexion;plantarflexion;sitting;10 repetitions  moves very slowly  -LB     Row Name 07/06/22 1019          Positioning and Restraints    Post Treatment Position wheelchair  -LB     In Wheelchair sitting;call light within reach;exit alarm on  -LB           User Key  (r) = Recorded By, (t) = Taken By, (c) = Cosigned By    Initials Name Provider Type    Liz Henriquez, PTA Physical Therapist Assistant              Wound 06/15/22 1312 Left anterior thigh Incision (Active)   Dressing Appearance open to air 07/06/22 0819   Closure Liquid skin adhesive 07/06/22 0819   Base dry 07/06/22 0819   Periwound intact;dry 07/06/22 0819   Periwound Temperature warm 07/06/22 0819   Drainage Amount none 07/06/22 0819   Dressing Care open to air 07/06/22 0819     Physical Therapy Education                 Title: PT OT SLP Therapies (In Progress)     Topic: Physical Therapy (In Progress)     Point: Mobility training (In Progress)      Learning Progress Summary           Patient Acceptance, E,D, VU,NR by LB at 7/6/2022 1051    Comment: w/c propulsion   Significant Other Refuses, E, NR by  at 7/4/2022 1245    Comment:  did not show for teaching      Show all documentation for this point (35)                 Point: Home exercise program (Resolved)     Learning Progress Summary           Patient Acceptance, E,H, VU by LB at 6/29/2022 1058      Show all documentation for this point (11)                 Point: Body mechanics (Resolved)     Learning Progress Summary           Patient Acceptance, E,TB,D, VU,NR by  at 6/25/2022 1434      Show all documentation for this point (8)                 Point: Precautions (Resolved)     Learning Progress Summary           Patient Acceptance, E,TB,D, VU,NR by  at 6/25/2022 1434      Show all documentation for this point (8)                             User Key     Initials Effective Dates Name Provider Type Discipline     06/16/21 -  Fany Lima, PT Physical Therapist PT     03/07/18 -  Liz Rodriguez PTA Physical Therapist Assistant PT     06/16/21 -  Alyx Whiting PT Physical Therapist PT                PT Recommendation and Plan         Patient was wearing a face mask during this therapy encounter. Therapist used appropriate personal protective equipment including mask and gloves.  Mask used was standard procedure mask. Appropriate PPE was worn during the entire therapy session. Hand hygiene was completed before and after therapy session. Patient is not in enhanced droplet precautions.                     Time Calculation:      PT Charges     Row Name 07/06/22 1245 07/06/22 1019          Time Calculation    Start Time 1230  -LB 1000  -LB     Stop Time 1300  -LB 1100  -LB     Time Calculation (min) 30 min  -LB 60 min  -LB           User Key  (r) = Recorded By, (t) = Taken By, (c) = Cosigned By    Initials Name Provider Type    LB Liz Rodriguez PTA Physical Therapist Assistant                 Therapy Charges for Today     Code Description Service Date Service Provider Modifiers Qty    94177989022 HC PT THER PROC EA 15 MIN 7/5/2022 Liz Rodriguez, PTA GP 4    32230594168 HC PT THER PROC EA 15 MIN 7/6/2022 Liz Rodriguez, GARY GP 6            PT G-Codes  AM-PAC 6 Clicks Score (PT): 17      Liz Rodriguez, GARY  7/6/2022

## 2022-07-06 NOTE — PROGRESS NOTES
CC: Debility    SUBJECTIVE:    Patient seen on hemodialysis.   Had episode of incontinent bowel while on dialysis.  Complains of being more fatigued today.  She attributes it to having dialysis 2 days in a row earlier and the Ambien at nighttime.  She only took Ambien 2.5 last night.  She does report that it helps with her sleep and wants to continue with Ambien at the lower dose.  She does not feel the pain medications making her drowsy as she tolerated that before she started on the Ambien.  Still with a burning pain at the left thigh-no change.         OBJECTIVE:  Vitals:    07/06/22 1215   BP: 174/75   Pulse: 57   Resp: 18   Temp: 98.1 °F (36.7 °C)   SpO2: 92%       GENERAL: NAD  MENTAL STATUS: Awake alert  HEENT:  NCAT  CARDIOVASCULAR: RRR   CHEST:  hemodialysis access right chest  RESPIRATORY: Normal respirations.    ABDOMEN:  non distended     EXTREMITIES: Left thigh edema persists.  Overall the amount of edema appears similar..    Edema bilateral lower extremities     Left quadricep muscle biopsy site-dressed  No myoclonus.  NEUROLOGIC:    Motor testing not performed as on hemodialysis      Scheduled Meds:aspirin, 81 mg, Oral, Daily  b complex-vitamin c-folic acid, 1 tablet, Oral, Daily  carvedilol, 25 mg, Oral, BID With Meals  cloNIDine, 0.2 mg, Oral, Q12H  epoetin brooke/brooke-epbx, 10,000 Units, Intravenous, Once per day on Mon Wed Fri  gabapentin, 100 mg, Oral, Q24H  gabapentin, 300 mg, Oral, BID  hydrALAZINE, 100 mg, Oral, Q8H  insulin glargine, 7 Units, Subcutaneous, QAM  insulin lispro, 0-7 Units, Subcutaneous, TID AC  levothyroxine, 150 mcg, Oral, Q AM  lidocaine, 1 patch, Transdermal, Q24H  losartan, 100 mg, Oral, Q24H  miconazole, 1 application, Topical, Q12H  pantoprazole, 40 mg, Oral, Q AM  rOPINIRole, 0.75 mg, Oral, Nightly  sertraline, 150 mg, Oral, Daily  sodium zirconium cyclosilicate, 5 g, Oral, Daily  tamsulosin, 0.4 mg, Oral, Daily      Continuous Infusions:   PRN Meds:.•  acetaminophen  •   dextrose  •  dextrose  •  diphenoxylate-atropine  •  glucagon (human recombinant)  •  heparin (porcine)  •  hydrALAZINE  •  nitroglycerin  •  oxyCODONE  •  oxyCODONE  •  sodium chloride  •  zolpidem    Glucose   Date/Time Value Ref Range Status   07/06/2022 1125 166 (H) 70 - 130 mg/dL Final     Comment:     Meter: [id number] : [id number] Cricket ELISSAHongShakiraobie Harley    07/06/2022 0630 119 70 - 130 mg/dL Final     Comment:     Meter: [id number] : [id number] Le[facility]   07/05/2022 2048 129 70 - 130 mg/dL Final     Comment:     Meter: [id number] : [id number] León [facility]   07/05/2022 1626 91 70 - 130 mg/dL Final     Comment:     Meter: [id number] : [id number] Carlton Thompson    07/05/2022 1126 228 (H) 70 - 130 mg/dL Final     Comment:     Meter: [id number] : [id number] Vincent Donna NA   07/05/2022 0629 178 (H) 70 - 130 mg/dL Final     Comment:     Meter: [id number] : [id number] León [facility]   07/04/2022 2115 253 (H) 70 - 130 mg/dL Final     Comment:     Meter: [id number] : [id number] León [facility]   07/04/2022 1611 104 70 - 130 mg/dL Final     Comment:     Meter: [id number] : [id number] Otilia SAAVEDRA     Results from last 7 days   Lab Units 07/06/22  1135 07/05/22  0949 07/04/22  0704   WBC 10*3/mm3 12.51* 15.65* 17.66*   HEMOGLOBIN g/dL 8.5* 8.4* 8.0*   HEMATOCRIT % 28.4* 27.9* 24.4*   PLATELETS 10*3/mm3 214 204 182     Results from last 7 days   Lab Units 07/06/22  1135 07/05/22  0949 07/04/22  0704   SODIUM mmol/L 131* 133* 134*   POTASSIUM mmol/L 6.1* 5.1 6.1*   CHLORIDE mmol/L 98 97* 99   CO2 mmol/L 21.0* 25.0 25.0   BUN mg/dL 41* 28* 33*   CREATININE mg/dL 3.34* 2.45* 2.33*   CALCIUM mg/dL 7.7* 7.7* 7.4*   BILIRUBIN mg/dL 0.6 0.5 0.4   ALK PHOS U/L 634* 533* 390*   ALT (SGPT) U/L 26 26 27   AST (SGOT) U/L 42* 35* 41*   GLUCOSE mg/dL 153* 189* 138*      [facility] Reference Range & Units 05/27/22 11:30   Creatine Kinase 20 - 180 U/L 94   Aldolase 3.3 - 10.3 U/L 5.6        Imaging Results (Last 24 Hours)     ** No results found for the last 24 hours. **          ASSESSMENT AND PLAN    # R MCA s/p TPA with subsequent ICH with debility  Initially held antiplatelet/anticoagulation.  Reviewed with neurology on May 20 and resume aspirin 81 mg daily  SBP goal <160  Recommend outpatient Neurology fu - repeat MRI in 3 months        # DM  June 27-hyperglycemia on steroids.  Lantus increased to 30 units twice daily, Humalog 5 units 3 times daily with meals, and all increase sliding scale coverage  June 28 -hypoglycemic after increasing insulin dosing.  Blood glucose up to 98 prior to lunch, will hold sliding scale insulin and give 5 units scheduled with meal  June 30-hypoglycemia-Lantus twice daily decreased from 35 to 20 units.  Scheduled Humalog with meals discontinued  July 1-prednisone has been discontinued.  Reviewed with internal medicine and Lantus decreased to 10 units twice daily and on discharge home to resume her home regimen of Lantus 10 units daily.  She will check her sugar tonight at home and if elevated give Lantus 10 units tonight and then continue with the Lantus 10 units daily tomorrow.  Reviewed with patient and her  that her sugars may be elevated initially as she just completed prednisone.  They were instructed to call for any additional instructions on adjustment in regimen if it remains elevated at home this weekend  July 6-Lantus 7 units daily     # ESRD   Nephrology following  Inpatient HD    Flomax  Hyperkalemia  July 5-patient had hemodialysis on July 3 and July 4 for hyperkalemia.  Lokelma resumed by nephrology     Infectious disease-   # s/p catheter infection with MRSA  Vancomycin IV with stop date of May 26  May 24-vancomycin random equal 12.90-on vancomycin 500 mg IV on Nfpukvw-Pvwpjmku-Abgjumpx  May 26-to complete course of vancomycin today  #Urinary tract infection-on Dana 3 urinalysis was negative for infection but noted to have leukocytosis  increase and repeat urinalysis on June 6 shows findings consistent with urinary tract infection.  Placed on a course of cefdinir renal dose 3 mg daily for 7 days.  No costophrenic angle tenderness patient reviewed with infectious disease service.  Leukocytosis felt related to the bladder infection and not previous catheter infection.  June 8-urine culture results pending.  On cefdinir.  Culture with E. coli, sensitivity pending  June 9 - E coli sensitive to cephalosporins.  June 21 - continues with leukocytosis WBC  15K today. May be from inflammatory process. Steroids added yesterday.   June 22-urine described as milky characteristic, different from previous-recheck urinalysis with culture if indicated  June 23-Labs are still pending today.  Urinalysis also pending as she does not make much urine.  She had a temperature of 100.2 last evening, afebrile this morning.  June 27 - abnormal UA but urine culture from June 24 no growth  July 1-leukocytosis felt related to the noninfectious process in the left thigh as well as secondary to steroids  July 5-WBC trending down to 15.6 today  July 6-WBC trending down to 12 K     #left hydroureteronephrosis-Dana 3-CT scan shows left hydro ureter nephrosis.  She had some previous dilation of the ureter on comparison the past studies but increased today.  Bladder noted to be markedly distended.  Will do in and out cath now and daily to decompress bladder.  Addendum-intermittent cath for 600 cc.  June 4-once daily intermittent cath 450 cc.  June 5-seen by urology-Nguyễn catheter placed with plans to recheck hydroureter on renal ultrasound in 3 to 5 days.  June 6-Nguyễn catheter placed yesterday by urology, with only 170 cc out so far.  Patient reports did not note any change in her left groin pain after the in and out cath with decompression of the bladder..  June 8-reviewed with urology service-request noncontrast CT scan stone protocol to evaluate for resolution of the left  hydronephrosis with urology planning to see tomorrow to review the films and then make recommendations, urology would recommend leaving the Nguyễn catheter in for the time being.  June 9 - improved left hydroureter on CT , Nguyễn discontinued.   July 1-to follow-up with urology as an outpatient with follow-up CT planned in August.  She occasionally required intermittent straight cath but this was very infrequent.  Home health nursing to follow.  Continues on Flomax     #Anemia-  EPO.  June 6-hemoglobin 7.8  June 21- hemoglobin 8.3  June 27 - hemoglobin 8.7  June 28 - hemoglobin 8.8     Lymphadenopathy-evaluated by hematology oncology while here.  No significant change from scans earlier this year.  She is to repeat a scan in 3 months and follow-up with hematology oncology     #Elevated alkaline phosphatase  June 6-elevated alkaline phosphatase 770, up from 289 one week ago, 140 one month ago. Similar elevation in March, Feb even higher at 1,330 ( intra-abdominal fluid collection, underwent CT-guided aspiration  Revealed blood and cultures did not reveal any growth and therefore antibiotics  discontinued.  Surgery also did evaluate and signed off.), and elevated during admission in January ( She was seen by general surgery who recommended and performed laparoscopic cholecystectomy during that admission).   ALT 70, AST 87, Total bilirubin 0.8. Ca 8.3.  ? If liver source or bone or due to an inflammatory response.  June 7-GGT also elevated.  Seen by gastroenterology.  [place] biliary source.  Liver ultrasound ordered  June 8-liver ultrasound was unremarkable  June 21 - patient declined liver biopsy.   June 27-gastroenterology following peripherally-plans to follow-up as an outpatient and review option of liver biopsy again if alkaline phosphatase remains elevated  June 28-remains elevated at 503  June 30-alkaline phosphatase lower at 426  July 6-trend back up to 634     #Pain - neuropathy BL lower extremity/left thigh  pain  Gabapentin/oxycodone.    June 7-patient with lethargy this morning.  May be due to urinary tract infection but with recent increase and gabapentin and oxycodone, will change gabapentin to 200 mg twice a day and decrease oxycodone from 5 back down to 2.5 mg p.o. every 4 hours as needed  June 8-better speed with her processing today  June 21 - pain med regimen recently adjusted with tramadol 25 mg q 4 hours prn, gabapentin 300 g bid, lidoderm patch, oxycodone 2.5 mg q 6 hours prn. Prednisone 60 mg daily added which may help with pain from inflammatory process. Reports pain better today and participated better in therapies.   June 27 - continue present regimen  July 1-home on oxycodone 5 mg p.o. every 4 hours.  Pain dispense #40, gabapentin 300 mg twice daily, Tylenol 500 mg every 6 hours as needed  July 5-increase gabapentin slightly 300-100-300 mg.  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  Seen by infectious disease service with no acute infectious process noted.  Evaluated by hematology regarding lymphadenopathy, lymph node felt too small to biopsy.  Patient was on a course of low-dose prednisone 10 mg for 3 days then 5 mg for 2 days and then another 10 mg dose with out any significant improvement in the swelling or pain.  She has a history of rheumatoid arthritis.  See CT of the left thigh and MRI of the left thigh and pelvis reports   With lumbar radiculoplexitis and diabetic amyotrophy, on review of the literature do not see edema that she presents with  associated with the findings or MRI changes in the tendon and musculature with edema.  There is descriptions of MRI changes in the plexus and peripheral nerve.  In terms of treatment options for diabetic amyotrophy , there have not been definitive clinical trials.  Immunomodulation with steroids has been inconclusive as well as other immunomodulation therapy.  Reviewed with the patient  that  feel that she would benefit from seeing her rheumatologist as an outpatient to evaluate this further, as there does appear to be inflammatory cause given the lymphadenopathy and edema.  Rheumatologist does not come to the hospital.  CPK x2 has been normal.  Aldolase has been normal.  May need to look at biopsy of left thigh muscle to assess further.   June 8-patient reviewed with neurology yesterday who will assess and also provide input regarding whether muscle biopsy may be helpful.  June 9 - Discussed with Neurology and Rheumatology - plan is for EMG and then muscle biopsy.   Dana 15 - Left quadricep muscle biopsy was completed 6/15, results pending.  Labs: CKs have been normal, ANCA panel 6/11 was unremarkable  June 21 - started on full treatment dose of Prednisone 60 mg daily yesterday pending path results. Reports pain better so far today. Specimen sent to [facility]. Clinical impression presently focal inflammatory myositis pending final pathology results.   June 22-pain continues better today.  June 23-outside pathology report still pending  June 24 - Patient reports left thigh pain better over past couple days but still present. Does not have the soreness at medial thigh as before. Complains of pain at mid-thigh. No change in swelling. Does have discomfort with proximal movement in LLE. Walked 320 feet CTG SBA RW today.   Pain improved on steroid. Discontinued atorvastatin. Biopsy results returned from [facility] - see report -Type 2 fiber atrophy, advanced. Denervation/reinnervation. No  evidence of inflammatory myopathy or vasculitis. [place] [facility] lab pursuing a dystrophy panel and will report findings in an addendum.  Reviewed with Neurology - as shown symptomatic response, continue Prednisone 60 mg daily for about one more week, then taper off over month.   June 27 - continue present regimen  June 28-increased pain medication regimen to oxycodone 5 mg every 4 hours as needed for severe pain.   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 Treatment would be aspirin which she is already on. Discussed with Neurology and as been on Prednisone 60 mg for only 1.5 week, will stop and not do taper. Discussed with Internal Medicine who will adjust insulin.  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  Reviewed with the patient that treatment for this is aspirin which she is already on.  She may do walking activities but would avoid strenuous activities with left quadricep strengthening.  She may strengthen the other 3 extremities as tolerated.  Reviewed may take 3 months to resolve.        # HTN   Coreg  Clonidine  Hydralazine  Losartan  Nephrology/internal medicine following     #Hypothyroidism  Levothyroxine  June 9 - recheck TSH- addendum Dana 10- On Nov 30, 2021 , on Dec 2, 2021 T4 = 0.90, on Dec 9, T4=1.68. On May 7, TSH = 134. Has been on Levothyroxine 125 mcg/day it appears since at least Dec 13, 2021. See Dr Templeton's discharge note from Dec 17,2021 which discusses thyroid labs/dosing at that time. On June 9, 2022 TSH = 54.4.   Check free T4. Will get evaluation from Internal Medicine regarding thyroid studies/levothyroxine dosing.  June 11 - levothyroxine increased to 150mcg daily-continue this dose and she will need follow up TSH in 4-6 weeks from June 11 as an outpatient  July 5-repeat TSH and T4 ordered for Monday, July  11     #CAD  ASA held in setting of ICH - restart aspirin 81 mg daily on May 20, 2022 per Neurology     #Depression   Sertraline  June 28-sertraline dose increased  July 5-reviewed with psychiatry service-continue present regimen    Insomnia-improved on Ambien 5 mg  July 6-fatigue during the day-decrease Ambien 2.5 mg nightly and assess response    #GERD   PPI     #Restless leg syndrome  Requip     #DVT prophylaxis-SCDs ordered but patient refusing.  Per neurology note May 11-continue off anticoagulation/antiplatelet for now. Restarted ASA 81 mg on May 20.  May 16-reviewed with patient and try venous foot compression devices  May 20-also not able to tolerate venous foot compression devices.  May 25-bilateral lower extremity venous duplex negative for DVT        -  comprehensive inpatient rehabilitation program working with PT, OT, SLP for a minimum of three hours per day, five days per week. - will monitor for progress     TEAM CONF - MAY 17- BED SBA. TRANSFER MIN. 4 STAIRS MIN. GAIT 160 FEET CTG RW. SLOW TO PROCESS. BATH MIN. LBD MIN. UBD MIN. GROOMING SET UP. TOILETING MIN . COGNITION OVERALL MILD DEFICITS. VISUAL IMPAIRMENT IMPACTS SOME TESTING. ESRD-HD. ANTIBIOTICS - VANCOMYCIN 10.90 YESTERDAY.  ELOS - 1-2 WEEKS.      TEAM CONF - MAY 24 - ALWAYS SO PROCESSING AFTER EACH DIALYSIS SESSION. AT HOME WOULD GO BACK TO HOME AND SLEEP. BED MIN ASSIST. TRANSFERS MIN. GAIT 80   FEET RW CTG. 4 STAIRS CTG. TOILET TRASNFERS AND SHOWER TRANSFERS MIN CTG. BATH CTG. LBD CTG. UBD SET UP. GROOMING SET UP. TOILETING CTG.   LEFT GROIN - ADDUCTOR PAIN - There is mild joint space narrowing involving both hips symmetrically. There are also some minimal degenerative changes involving both hips. The overall appearance is similar to the study of 12/16/2021. MODERATE WORD RETRIEVAL DEFICITS. IMPAIRED VISION AFFECTS HOME MANAGEMENT TASKS. HYPOGLLYCEMIA AFTER SSI .DECREASED TO 0-7 UNITS.   ELOS - ONE WEEK.         TEAM CONF - MAY 31 -  TRANSFERS CTG. GAIT 40- FEET CTG RW LIMITED BY THIGH PAIN. TOILET TRANSFERS CTG. BATH CTG. LBD CTG. UBD SET UP. TOILETING CTG.  GROOMING SET UP. COGNITION - MODERATE WORD RETRIEVAL DEFICITS, MODERATE IMPAIRED MEMORY IMPACTED BY FATIGUE, STILL SLOW PROCESSING.  BNE (Active)  Att'n. - Mildly Imp.  Exec. Fx. - Mildly Imp., slowed processing speed  Rsng/Jgmnt - WNL  Arith - Mod Imp.  Visuospatial Skills - DNA, pt declined citing poor vision  Visual Mem. DNA  Verbal Mem. - WNL  Emot - Pt endorsed mild dysphoria and anxiousness secodnary to current inpatient  Stay.  CONTINENT BOWEL. OCCASIONAL VOID. ESRD-HD. ON INSULIN. SKIN INTACT. LIDODERM PATCH TO LEFT ADDUCTOR TENDON. COURSE OF STEROID FOR LEFT THIGH JEREMI. CONSULTED ORTHO FOR INPUT.  ELOS - POSSIBLY Thursday DEPENDING ON FURTHER EVALUATIONS.            TEAM CONF - JUNE 21 -  DECLINED THERAPY DUE TO PAIN.  AT MOST RECENT THERAPY WHEN PARTICIPATED, TRANSFERS MIN. GAIT 80 FEET MIN / CTG MIN ASSIST RW. TOILET TRANSFERS CTG. TOILETING CTG. MODERATE IMPAIRED VERBAL MEMORY. PAIN MANAGEMENT - MEDS ADJUSTED - OXYCODONE 2.5 MG Q 6 HOURS PRN, TRAMADOL 25 MG Q 4 HOURS. MUSCLE BIOPSY RESULTS PENDING. STARTING ON PREDNISONE 60 MG DAILY YESTERDAY. WILL NEED TO ADJUST INSULING, BLOOD GLUCOSE > 300 THIS AM. GASTROENTEROLOGY EVALUATING LIVER. PATIENT DECLINES LIVER BIOPSY.  HYPOTHYROID - levothyroxine increased to 150mcg daily-continue this dose and she will need follow up TSH in 4-6 weeks from June 11 as an outpatient  ELOS -   1.5 WEEKS     TEAM CONF - June 28 - BED MIN  SIT TO SUPINE, SBA SUPINE TO SIT. CAR TRANSFERS CTG. TRANSFERS MIN ASSIST. 4 STAIRS MIN. GAIT 240 FEET CTG SBA. TOILET TRANSFERS MIN. UBB SBA. LBB MIN WITH LLE. UBD SET UP. LBD MOD ASSIST LLE.   INSULIN ADJUSTED FOR ELEVATED BLOOD GLUCOSE ON STEROIDS. ON PREDNISONE 60 MG DAILY AND PLAN TO TAPER OVER NEXT MONTH . BIOPSY REPORT SENT TO OUTPATIENT RHEUMATOLOGIST YESTERDAY. ADDITIONAL STUDIES ON BIOPSY PENDING.    BLADDER SCAN 400 CC BUT ONLY 200 CC ON INTERMITTENT STRAIGHT CATH. LEFT QUAD MUSCLE BIOPSY SITE HEALING.  ESRD - HD.   ELOS - Thursday WITH HOME HEALTH PT, OT, AND NURSING FOR DIABETES AND MONITORING ON STEROIDS.             Adonis Florentino MD       During rounds, used appropriate personal protective equipment including mask and gloves.  Additional gown if indicated.  Mask used was standard procedure mask. Appropriate PPE was worn during the entire visit.  Hand hygiene was completed before and after.

## 2022-07-06 NOTE — PROGRESS NOTES
Inpatient Rehabilitation Plan of Care Note    Plan of Care  Care Plan Reviewed - No updates at this time.    Safety    Performed Intervention(s)  Fall precautions: bed low and locked, chair alarm and bed alarms in use, nonskid  socks and gait belt in use, call light and personal belongings within reach.  Hourly rounding.      Psychosocial    Performed Intervention(s)  Provide distraction and/or relaxation as needed.  Allow extra time for patient to verbalize needs, concerns, feelings, etc.      Body Systems    Performed Intervention(s)  Dialysis mwf  Monitor weight and I/O.  DM educator consult.  Accuchecks as ordered.  Nguyễn catheter care Q shift      Sphincter Control    Performed Intervention(s)  Monitor I/O.  Bowel meds prn.      Pain    Performed Intervention(s)  Pain meds prn .  Ice to LLE per order    Signed by: Jannie Carrillo RN

## 2022-07-06 NOTE — THERAPY TREATMENT NOTE
Inpatient Rehabilitation - Occupational Therapy Treatment Note    [facility]     Patient Name: Zaina Martinez  : 1965  MRN: [id number]    Today's Date: 2022                 Admit Date: 2022         ICD-10-CM ICD-9-CM   1. Generalized weakness  R53.1 780.79   2. Diabetic muscle infarction ([facility])  E11.69 250.80    M62.20 728.89   3. Other insomnia  G47.09 780.52       Patient Active Problem List   Diagnosis   • Renal insufficiency   • Hypertensive disorder   • Hypothyroidism   • Type 2 diabetes mellitus with kidney complication, with long-term current use of insulin ([facility])   • Rheumatoid arthritis ([facility])   • Angioedema   • Esophageal dysmotility   • Anemia   • Medically noncompliant   • Myocardial infarction due to demand ischemia ([facility])   • Enteritis   • PRES (posterior reversible encephalopathy syndrome)   • Urine retention   • Klebsiella infection   • Superficial thrombophlebitis   • Generalized weakness   • ESRD (end stage renal disease) ([facility])   • CAD (coronary artery disease)   • Abnormal urinalysis   • Chronic diastolic CHF (congestive heart failure) ([facility])   • Pyelonephritis   • Calculus of gallbladder with acute on chronic cholecystitis without obstruction   • Pleural effusion on right   • Anemia due to chronic kidney disease, on chronic dialysis ([facility])   • Abnormal findings on diagnostic imaging of other specified body structures   • Acute upper respiratory infection   • Agitation   • Alkaline phosphatase raised   • Casts present in urine   • Cellulitis of toe   • Hip pain   • Community acquired pneumonia   • Depressive disorder   • Diarrhea of presumed infectious origin   • Difficult or painful urination   • Disease due to severe acute respiratory syndrome coronavirus 2 (SARS-CoV-2)   • Dyspnea   • Encounter for follow-up examination after completed treatment for conditions other than malignant neoplasm   • H/O: hypothyroidism   • Hyperlipidemia   • Hypomagnesemia   • Intractable vomiting with  nausea   • Leukocytosis   • Luetscher's syndrome   • Need for influenza vaccination   • Restless legs   • Noncompliance with treatment   • Shoulder pain   • Urinary tract infectious disease   • Metabolic encephalopathy   • Abnormal findings on diagnostic imaging of abdomen   • Status post cholecystectomy   • Hyponatremia   • Leukocytosis   • Acute metabolic encephalopathy   • Encephalopathy, toxic   • Acute CVA (cerebrovascular accident) (HCC)   • Intracranial hemorrhage (HCC)   • Stroke (HCC)   • Abnormal urinalysis   • Diabetic muscle infarction (HCC)   • Other insomnia       Past Medical History:   Diagnosis Date   • Acute CVA (cerebrovascular accident) (HCC) 5/1/2022   • Acute on chronic diastolic CHF (congestive heart failure) (HCC)    • CAD (coronary artery disease) 12/20/2021   • Diabetes (HCC)    • Disease of thyroid gland    • GERD (gastroesophageal reflux disease)    • Hyperlipidemia 11/30/2018   • Hypertension    • Rheumatoid arthritis (HCC)        Past Surgical History:   Procedure Laterality Date   • CHOLECYSTECTOMY WITH INTRAOPERATIVE CHOLANGIOGRAM N/A 1/10/2022    Procedure: Laparoscopic cholecystectomy with intraoperative cholangiogram;  Surgeon: Ramana Raygoza MD;  Location: [facility];  Service: General;  Laterality: N/A;   • EYE SURGERY     • HYSTERECTOMY     • INSERTION HEMODIALYSIS CATHETER N/A 12/6/2021    Procedure: HEMODIALYSIS CATHETER INSERTION;  Surgeon: Keli Salazar MD;  Location: [facility] 18/19;  Service: Vascular;  Laterality: N/A;   • INSERTION HEMODIALYSIS CATHETER N/A 5/3/2022    Procedure: TUNNELED CATHETER PLACEMENT;  Surgeon: Keli Salazar MD;  Location: [facility] OR;  Service: Vascular;  Laterality: N/A;   • MUSCLE BIOPSY Left 6/15/2022    Procedure: Left quadriceps muscle biopsy;  Surgeon: Marques Ma MD;  Location: [facility] OR;  Service: Neurosurgery;  Laterality: Left;             IRF OT ASSESSMENT FLOWSHEET (last 12 hours)     IRF OT  Evaluation and Treatment     Row Name 07/06/22 1512 07/06/22 1216       OT Time and Intention    Document Type daily treatment  -AF daily treatment  -AF    Mode of Treatment occupational therapy  -AF occupational therapy  -AF    Patient Effort fair  -AF fair  -AF    Row Name 07/06/22 1512 07/06/22 1216       General Information    Patient/Family/Caregiver Comments/Observations pt sitting up in w/c after PT session  -AF pt sitting up in bed  -AF    General Observations of Patient -- discussed with patient what she feels to go  home she just needs to get her leg stronger  -AF    Existing Precautions/Restrictions fall;other (see comments)  contact precautions  -AF fall;other (see comments)  contact precautions  -AF    Row Name 07/06/22 1512 07/06/22 1216       Pain Assessment    Pretreatment Pain Rating 7/10  -AF 7/10  -AF    Posttreatment Pain Rating 8/10  -AF 8/10  -AF    Pain Location - Side/Orientation Left  -AF Left  -AF    Pain Location - groin  -AF groin  -AF    Row Name 07/06/22 1512 07/06/22 1216       Cognition/Psychosocial    Affect/Mental Status (Cognition) flat/blunted affect  -AF flat/blunted affect  -AF    Orientation Status (Cognition) oriented x 3  -AF oriented x 3  -AF    Follows Commands (Cognition) follows one-step commands  -AF follows one-step commands  -AF    Personal Safety Interventions fall prevention program maintained;gait belt;nonskid shoes/slippers when out of bed  -AF fall prevention program maintained;gait belt;nonskid shoes/slippers when out of bed  -AF    Row Name 07/06/22 1216          Bathing    [place] Level (Bathing) bathing skills;upper body;contact guard assist;lower body;moderate assist (50% patient effort)  -AF     Position (Bathing) sink side;supported sitting;supported standing  -AF     Set-up Assistance (Bathing) obtain supplies  -AF     Row Name 07/06/22 1216          Upper Body Dressing    [place] Level (Upper Body Dressing) upper body dressing skills;set up  assistance  -AF     Position (Upper Body Dressing) supported sitting  -AF     Set-up Assistance (Upper Body Dressing) obtain clothing  -AF     Row Name 07/06/22 1216          Lower Body Dressing    [place] Level (Lower Body Dressing) doff;don;pants/bottoms;shoes/slippers;socks;dependent (less than 25% patient effort);maximum assist (25% patient effort)  -AF     Position (Lower Body Dressing) supported sitting;supported standing  -AF     Set-up Assistance (Lower Body Dressing) obtain clothing  -AF     Comment (Lower Body Dressing) w/c level  -AF     Row Name 07/06/22 1216          Grooming    [place] Level (Grooming) grooming skills;set up  -AF     Position (Grooming) sink side;supported sitting  -AF     Set-up Assistance (Grooming) obtain supplies  -AF     Comment (Grooming) w/c level  -AF     Row Name 07/06/22 1512 07/06/22 1216       Toileting    [place] Level (Toileting) toileting skills;dependent (less than 25% patient effort)  -AF --    Assistive Device Use (Toileting) raised toilet seat;grab bar/safety frame  -AF --    Position (Toileting) supported sitting;supported standing  -AF --    Comment (Toileting) had bowel accident required A x 2 to clean up  -AF change brief with ADl tasks  -AF    Row Name 07/06/22 1512 07/06/22 1216       Bed Mobility    Supine-Sit [place] (Bed Mobility) -- standby assist;verbal cues  -AF    Sit-Supine [place] (Bed Mobility) minimum assist (75% patient effort)  -AF --    Assistive Device (Bed Mobility) bed rails  -AF bed rails  -AF    Row Name 07/06/22 1512 07/06/22 1216       Transfer Assessment/Treatment    Comment, (Transfers) sit to stand MAX A with toileting tasks  -AF sit to stand with ADL tasks to sink level MIN A  -AF    Row Name 07/06/22 1512 07/06/22 1216       Transfers    Bed-Chair [place] (Transfers) -- minimum assist (75% patient effort);verbal cues  -AF    Chair-Bed [place] (Transfers) maximum assist (25% patient effort)  -AF --     Assistive Device (Bed-Chair Transfers) -- wheelchair;walker, front-wheeled  -AF    [place] Level (Toilet Transfer) minimum assist (75% patient effort);maximum assist (25% patient effort);1 person assist;2 person assist  -AF --    Assistive Device (Toilet Transfer) wheelchair;walker, front-wheeled  -AF --    Row Name 07/06/22 1216          Bed-Chair Transfer    Comment, (Bed-Chair Transfer) increased time  -AF     Row Name 07/06/22 1512          Chair-Bed Transfer    Assistive Device (Chair-Bed Transfers) wheelchair  -AF     Comment, (Chair-Bed Transfer) squat pivot  -AF     Row Name 07/06/22 1512          Toilet Transfer    Type (Toilet Transfer) stand pivot/stand step  -AF     Comment, (Toilet Transfer) had to have 2 people assist her back from the commode to the w/c secodnary to decreased strenght and pain  -AF     Row Name 07/06/22 1512          Motor Skills    Therapeutic Exercise aerobic  -AF     Row Name 07/06/22 1512          Hand (Therapeutic Exercise)    Hand Strengthening (Therapeutic Exercise) bilateral; strengthening;hand gripper;20 repititions  -AF     Row Name 07/06/22 1512          Aerobic Exercise    Type (Aerobic Exercise) arm bike;for 2 minutes  x 2 sets  -AF     Row Name 07/06/22 1216          Balance    Static Sitting Balance independent  -AF     Static Standing Balance contact guard  -AF     Row Name 07/06/22 1512 07/06/22 1216       Positioning and Restraints    Pre-Treatment Position sitting in chair/recliner  -AF in bed  -AF    Post Treatment Position bed  -AF wheelchair  -AF    In Bed supine;call light within reach;exit alarm on;encouraged to call for assist  -AF --    In Wheelchair -- sitting;call light within reach;encouraged to call for assist;exit alarm on  -AF          User Key  (r) = Recorded By, (t) = Taken By, (c) = Cosigned By    Initials Name Effective Dates    AF Tasha Helm, OTR 06/16/21 -                  Occupational Therapy Education                 Title: PT OT  SLP Therapies (In Progress)     Topic: Occupational Therapy (Resolved)     Point: ADL training (Resolved)     Description:   Instruct learner(s) on proper safety adaptation and remediation techniques during self care or transfers.   Instruct in proper use of assistive devices.              Learning Progress Summary           Patient Acceptance, E,TB,D, VU,NR by  at 6/25/2022 1434      Show all documentation for this point (2)                 Point: Home exercise program (Resolved)     Description:   Instruct learner(s) on appropriate technique for monitoring, assisting and/or progressing therapeutic exercises/activities.              Learning Progress Summary           Patient Acceptance, E,D,H, VU by  at 6/30/2022 1544    Comment: review of HEP with hand weights and theraband      Show all documentation for this point (2)                 Point: Precautions (Resolved)     Description:   Instruct learner(s) on prescribed precautions during self-care and functional transfers.              Learning Progress Summary           Patient Acceptance, E,TB,D, VU,NR by  at 6/25/2022 1434      Show all documentation for this point (2)                 Point: Body mechanics (Resolved)     Description:   Instruct learner(s) on proper positioning and spine alignment during self-care, functional mobility activities and/or exercises.              Learning Progress Summary           Patient Acceptance, E,TB,D, VU,NR by  at 6/25/2022 1434      Show all documentation for this point (2)                             User Key     Initials Effective Dates Name Provider Type Discipline     06/16/21 -  Fany Lima, PT Physical Therapist PT     06/16/21 -  Tasha Helm OTR Occupational Therapist OT                    OT Recommendation and Plan                         Time Calculation:      Time Calculation- OT     Row Name 07/06/22 1524 07/06/22 1221          Time Calculation- OT    OT Start Time 1300  -AF 0830  -AF     OT Stop  Time 1330  -AF 0930  -AF     OT Time Calculation (min) 30 min  -AF 60 min  -AF           User Key  (r) = Recorded By, (t) = Taken By, (c) = Cosigned By    Initials Name Provider Type    Tasha Metz, OTJANICE Occupational Therapist              Therapy Charges for Today     Code Description Service Date Service Provider Modifiers Qty    02491643990 HC OT SELF CARE/MGMT/TRAIN EA 15 MIN 7/5/2022 Tasha Helm, OTR GO 4    18273526260 HC OT THER PROC EA 15 MIN 7/5/2022 Tasha Helm, OTR GO 1    69066893912 HC OT SELF CARE/MGMT/TRAIN EA 15 MIN 7/5/2022 Tasha Helm, OTR GO 1    32321422952 HC OT SELF CARE/MGMT/TRAIN EA 15 MIN 7/6/2022 Tasha Helm, OTR GO 4    31600163244 HC OT SELF CARE/MGMT/TRAIN EA 15 MIN 7/6/2022 Tasha Helm, OTR GO 1    08841615680 HC OT THER PROC EA 15 MIN 7/6/2022 Tasha Helm, OTR GO 1                   Tasha Helm OTJANICE  7/6/2022   60

## 2022-07-06 NOTE — NURSING NOTE
Dialysis complete, removed 3.4 liters with this treatment, received 10,000 units of epogen with dialysis and no complications noted.  Vital signs are in epic.

## 2022-07-06 NOTE — PROGRESS NOTES
Spoke with patient's , by phone, to discuss patient's current status and d/c plan options.  stated he only has SUV to provide transportation for patient, and patient was too weak on Friday, 7/1, previous day of d/c, to get into SUV.  stated patient has smaller sports car and it sits too low for her to get in/out of so his SUV is only option. Discussed her weakness and pain with left leg so will most likely continue to have trouble getting in/out of his SUV. Discussed [facility] as option for transportation. Will have Rec Therapist check to see if they live within service area since live in [place]. If they do, will have RT fill out application with patient.  concerned about patient's medical status as well as her physical status with regards to being ready to go home.  was concerned about patient's potassium being critically high over weekend and needing additional dialysis. He questioned how they would know about a critical lab at home and how this would be managed. Will discuss with rehab MD.  realizes patient can't live in the hospital and feels they could provide 24 hour assistance at home between him and daughter on a more shorter term basis, but needs patient able to be doing better physically and to be medically stable. Discussed option of sub acute facility.  stated he would need to discuss this with patient before making a decision to look at this option. Discussed with patient again today as well. Patient groggy both yesterday and today during our conversation--would fall asleep as she was talking, but better today. Patient stated she took lower dose of Ambien last night to help her sleep, which she reports really helps her, but feels it also makes her drowsy during day. She also feels she has been more sluggish due to having dialysis 2 days in a row with more fluid being taken off. Discussed conversation with . Patient would like information about  NH ratings for the facilities that have dialysis in house. She has been at [facility] in past and did not feel they acknowledged her care needs adequately. Will provide her with information so she and  can discuss. She also voices concern about going home and knowing if her labs are critical and what to do in this case. Encouraged her also to discuss her concerns with her doctors. Will continue to follow and assist with d/c plans. Updated patient's [facility] that patient still in acute rehab.

## 2022-07-06 NOTE — PLAN OF CARE
Problem: Rehabilitation (IRF) Plan of Care  Goal: Plan of Care Review  Recent Flowsheet Documentation  Taken 7/6/2022 0307 by Jannie Carrillo, RN  Progress: improving  Plan of Care Reviewed With: patient  Outcome Evaluation: Pt is A&Ox4, forgetful at times, she was drowsy at beginning of shift but family came and she woke up and ate food and was more alert, she decided she only wanted to take half of the ambien as she was thinking it was making her more drowsy, did c/o pain, medicated per pt request, pt does have red/pink rash area on bottom, one im upper middle area of coccyx and the other more to the left upper area, applied fungal cream as ordered, pt did not have any complaints of rash, meds whole with water, dialysis in am, resting well tonight, will continue to monitor.   Goal Outcome Evaluation:  Plan of Care Reviewed With: patient        Progress: improving  Outcome Evaluation: Pt is A&Ox4, forgetful at times, she was drowsy at beginning of shift but family came and she woke up and ate food and was more alert, she decided she only wanted to take half of the ambien as she was thinking it was making her more drowsy, did c/o pain, medicated per pt request, pt does have red/pink rash area on bottom, one im upper middle area of coccyx and the other more to the left upper area, applied fungal cream as ordered, pt did not have any complaints of rash, meds whole with water, dialysis in am, resting well tonight, will continue to monitor.

## 2022-07-06 NOTE — PROGRESS NOTES
"DAILY PROGRESS NOTE  [facility]    Patient Identification:  Name: Zaina Martinez  Age: 56 y.o.  Sex: female  :  1965  MRN: [id number]         Primary Care Physician: Karson Oreilly MD    Subjective:  Interval History:She complains of leg pain.      Objective:    Scheduled Meds:aspirin, 81 mg, Oral, Daily  b complex-vitamin c-folic acid, 1 tablet, Oral, Daily  carvedilol, 25 mg, Oral, BID With Meals  cloNIDine, 0.2 mg, Oral, Q12H  epoetin brooke/brooke-epbx, 10,000 Units, Intravenous, Once per day on   gabapentin, 100 mg, Oral, Q24H  gabapentin, 300 mg, Oral, BID  hydrALAZINE, 100 mg, Oral, Q8H  insulin glargine, 7 Units, Subcutaneous, QAM  insulin lispro, 0-7 Units, Subcutaneous, TID AC  levothyroxine, 150 mcg, Oral, Q AM  lidocaine, 1 patch, Transdermal, Q24H  losartan, 100 mg, Oral, Q24H  miconazole, 1 application, Topical, Q12H  pantoprazole, 40 mg, Oral, Q AM  rOPINIRole, 0.75 mg, Oral, Nightly  sertraline, 150 mg, Oral, Daily  sodium zirconium cyclosilicate, 5 g, Oral, Daily  tamsulosin, 0.4 mg, Oral, Daily      Continuous Infusions:     Vital signs in last 24 hours:  Temp:  [97.9 °F (36.6 °C)-98.1 °F (36.7 °C)] 98.1 °F (36.7 °C)  Heart Rate:  [54-57] 57  Resp:  [16-18] 18  BP: (107-174)/(58-80) 174/75    Intake/Output:    Intake/Output Summary (Last 24 hours) at 2022 1731  Last data filed at 2022 1204  Gross per 24 hour   Intake 680 ml   Output --   Net 680 ml       Exam:  /75 (BP Location: Right arm, Patient Position: Sitting)   Pulse 57   Temp 98.1 °F (36.7 °C) (Oral)   Resp 18   Ht 157.5 cm (62.01\")   Wt 75.5 kg (166 lb 7.2 oz)   SpO2 92%   BMI 30.44 kg/m²     General Appearance:    Alert, cooperative, no distress   Head:    Normocephalic, without obvious abnormality, atraumatic   Eyes:       Throat:   Lips, tongue, gums normal   Neck:   Supple, symmetrical, trachea midline, no JVD   Lungs:     Clear to auscultation bilaterally, respirations unlabored "   Chest Wall:    No tenderness or deformity    Heart:    Regular rate and rhythm, S1 and S2 normal, no murmur,no  Rub or gallop   Abdomen:     Soft, nontender, bowel sounds active, no masses, no organomegaly    Extremities:   Extremities normal, atraumatic, no cyanosis or edema   Pulses:      Skin:   Skin is warm and dry,  no rashes or palpable lesions   Neurologic:   no focal deficits noted      Lab Results (last 72 hours)     Procedure Component Value Units Date/Time    POC Glucose Once [[id number]]  (Normal) Collected: 07/02/22 1719    Specimen: Blood Updated: 07/02/22 1721     Glucose 102 mg/dL      Comment: Meter: [id number] : [id number] Smooth Miranda RN       POC Glucose Once [[id number]]  (Abnormal) Collected: 07/02/22 1631    Specimen: Blood Updated: 07/02/22 1635     Glucose 41 mg/dL      Comment: RN Notified R and V Meter: [id number] : [id number] Smooth Miranda RN       POC Glucose Once [[id number]]  (Normal) Collected: 07/02/22 1151    Specimen: Blood Updated: 07/02/22 1152     Glucose 97 mg/dL      Comment: Meter: [id number] : [id number] Randolph SAAVEDRA       POC Glucose Once [[id number]]  (Normal) Collected: 07/02/22 1024    Specimen: Blood Updated: 07/02/22 1025     Glucose 120 mg/dL      Comment: Meter: [id number] : [id number] Smooth Miranda RN       Basic Metabolic Panel [[id number]]  (Abnormal) Collected: 07/02/22 0828    Specimen: Blood Updated: 07/02/22 0933     Glucose 65 mg/dL      BUN 40 mg/dL      Creatinine 2.50 mg/dL      Sodium 135 mmol/L      Potassium 5.3 mmol/L      Chloride 98 mmol/L      CO2 24.0 mmol/L      Calcium 7.6 mg/dL      BUN/Creatinine Ratio 16.0     Anion Gap 13.0 mmol/L      eGFR 22.1 mL/min/1.73      Comment: National Kidney Foundation and American Society of Nephrology (ASN) Task Force recommended calculation based on the Chronic Kidney Disease Epidemiology Collaboration (CKD-EPI) equation refit without adjustment for race.       Narrative:      GFR Normal  >60  Chronic Kidney Disease <60  Kidney Failure <15      Hepatic Function Panel [[id number]]  (Abnormal) Collected: 07/02/22 0828    Specimen: Blood Updated: 07/02/22 0931     Total Protein 5.5 g/dL      Albumin 2.80 g/dL      ALT (SGPT) 14 U/L      AST (SGOT) 28 U/L      Alkaline Phosphatase 380 U/L      Total Bilirubin 0.4 mg/dL      Bilirubin, Direct <0.2 mg/dL      Bilirubin, Indirect --     Comment: Unable to calculate       Phosphorus [[id number]]  (Abnormal) Collected: 07/02/22 0828    Specimen: Blood Updated: 07/02/22 0931     Phosphorus 4.6 mg/dL     CBC & Differential [[id number]]  (Abnormal) Collected: 07/02/22 0828    Specimen: Blood Updated: 07/02/22 0904    Narrative:      The following orders were created for panel order CBC & Differential.  Procedure                               Abnormality         Status                     ---------                               -----------         ------                     CBC Auto Differential[[id number]]        Abnormal            Final result                 Please view results for these tests on the individual orders.    CBC Auto Differential [[id number]]  (Abnormal) Collected: 07/02/22 0828    Specimen: Blood Updated: 07/02/22 0904     WBC 21.35 10*3/mm3      RBC 3.21 10*6/mm3      Hemoglobin 8.4 g/dL      Hematocrit 26.8 %      MCV 83.5 fL      MCH 26.2 pg      MCHC 31.3 g/dL      RDW 18.9 %      RDW-SD 56.6 fl      MPV 10.0 fL      Platelets 234 10*3/mm3      Neutrophil % 81.5 %      Lymphocyte % 10.6 %      Monocyte % 6.7 %      Eosinophil % 0.1 %      Basophil % 0.1 %      Immature Grans % 1.0 %      Neutrophils, Absolute 17.40 10*3/mm3      Lymphocytes, Absolute 2.27 10*3/mm3      Monocytes, Absolute 1.42 10*3/mm3      Eosinophils, Absolute 0.02 10*3/mm3      Basophils, Absolute 0.02 10*3/mm3      Immature Grans, Absolute 0.22 10*3/mm3      nRBC 0.0 /100 WBC     POC Glucose Once [[id number]]  (Normal) Collected: 07/02/22 0614    Specimen: Blood Updated:  07/02/22 0615     Glucose 92 mg/dL      Comment: Meter: [id number] : [id number] Authorly        POC Glucose Once [[id number]]  (Abnormal) Collected: 07/02/22 0253    Specimen: Blood Updated: 07/02/22 0254     Glucose 140 mg/dL      Comment: Meter: [id number] : [id number] Kushal Choe RN       POC Glucose Once [[id number]]  (Abnormal) Collected: 07/01/22 2036    Specimen: Blood Updated: 07/01/22 2037     Glucose 139 mg/dL      Comment: Meter: [id number] : [id number] Authorly        Hepatic Function Panel [[id number]]  (Abnormal) Collected: 07/01/22 1827    Specimen: Blood Updated: 07/01/22 1926     Total Protein 6.0 g/dL      Albumin 3.20 g/dL      ALT (SGPT) 18 U/L      AST (SGOT) 33 U/L      Alkaline Phosphatase 450 U/L      Total Bilirubin 0.5 mg/dL      Bilirubin, Direct 0.2 mg/dL      Bilirubin, Indirect 0.3 mg/dL     Basic Metabolic Panel [[id number]]  (Abnormal) Collected: 07/01/22 1827    Specimen: Blood Updated: 07/01/22 1926     Glucose 75 mg/dL      BUN 34 mg/dL      Creatinine 2.21 mg/dL      Sodium 134 mmol/L      Potassium 5.4 mmol/L      Chloride 97 mmol/L      CO2 24.0 mmol/L      Calcium 7.9 mg/dL      BUN/Creatinine Ratio 15.4     Anion Gap 13.0 mmol/L      eGFR 25.6 mL/min/1.73      Comment: National Kidney Foundation and American Society of Nephrology (ASN) Task Force recommended calculation based on the Chronic Kidney Disease Epidemiology Collaboration (CKD-EPI) equation refit without adjustment for race.       Narrative:      GFR Normal >60  Chronic Kidney Disease <60  Kidney Failure <15      Phosphorus [[id number]]  (Normal) Collected: 07/01/22 1827    Specimen: Blood Updated: 07/01/22 1926     Phosphorus 3.2 mg/dL     CBC & Differential [[id number]]  (Abnormal) Collected: 07/01/22 1827    Specimen: Blood Updated: 07/01/22 1909    Narrative:      The following orders were created for panel order CBC & Differential.  Procedure                               Abnormality          Status                     ---------                               -----------         ------                     CBC Auto Differential[[id number]]        Abnormal            Final result                 Please view results for these tests on the individual orders.    CBC Auto Differential [[id number]]  (Abnormal) Collected: 07/01/22 1827    Specimen: Blood Updated: 07/01/22 1909     WBC 24.21 10*3/mm3      RBC 3.60 10*6/mm3      Hemoglobin 9.4 g/dL      Hematocrit 30.0 %      MCV 83.3 fL      MCH 26.1 pg      MCHC 31.3 g/dL      RDW 19.0 %      RDW-SD 56.4 fl      MPV 10.3 fL      Platelets 288 10*3/mm3      Neutrophil % 94.6 %      Lymphocyte % 2.7 %      Monocyte % 1.4 %      Eosinophil % 0.0 %      Basophil % 0.1 %      Immature Grans % 1.2 %      Neutrophils, Absolute 22.89 10*3/mm3      Lymphocytes, Absolute 0.65 10*3/mm3      Monocytes, Absolute 0.34 10*3/mm3      Eosinophils, Absolute 0.00 10*3/mm3      Basophils, Absolute 0.03 10*3/mm3      Immature Grans, Absolute 0.30 10*3/mm3      nRBC 0.0 /100 WBC     POC Glucose Once [[id number]]  (Abnormal) Collected: 07/01/22 1606    Specimen: Blood Updated: 07/01/22 1607     Glucose 287 mg/dL      Comment: Meter: [id number] : [id number] Dierken Jessica L RN       POC Glucose Once [[id number]]  (Normal) Collected: 07/01/22 1246    Specimen: Blood Updated: 07/01/22 1247     Glucose 126 mg/dL      Comment: Meter: [id number] : [id number] Dierken Jessica L RN       POC Glucose Once [[id number]]  (Normal) Collected: 07/01/22 0639    Specimen: Blood Updated: 07/01/22 0641     Glucose 95 mg/dL      Comment: Meter: [id number] : [id number] [employer] CNA       POC Glucose Once [[id number]]  (Abnormal) Collected: 07/01/22 0208    Specimen: Blood Updated: 07/01/22 0210     Glucose 186 mg/dL      Comment: Meter: [id number] : [id number] Schneider Daliyan CNA       POC Glucose Once [[id number]]  (Abnormal) Collected: 06/30/22 2032    Specimen: Blood Updated: 06/30/22 2034      Glucose 251 mg/dL      Comment: Meter: [id number] : [id number] Jennie Gilliam CNA       POC Glucose Once [[id number]]  (Abnormal) Collected: 06/30/22 1615    Specimen: Blood Updated: 06/30/22 1616     Glucose 197 mg/dL      Comment: Meter: [id number] : [id number] Makharadze Firuza NA       POC Glucose Once [[id number]]  (Normal) Collected: 06/30/22 1149    Specimen: Blood Updated: 06/30/22 1201     Glucose 100 mg/dL      Comment: Meter: [id number] : [id number] Makharadze Firuza NA       POC Glucose Once [[id number]]  (Abnormal) Collected: 06/30/22 1115    Specimen: Blood Updated: 06/30/22 1117     Glucose 68 mg/dL      Comment: Meter: [id number] : [id number] Makharadze Firuza NA       POC Glucose Once [[id number]]  (Abnormal) Collected: 06/30/22 1100    Specimen: Blood Updated: 06/30/22 1102     Glucose 55 mg/dL      Comment: Meter: [id number] : [id number] Makharadze Firuza NA       POC Glucose Once [[id number]]  (Abnormal) Collected: 06/30/22 1045    Specimen: Blood Updated: 06/30/22 1045     Glucose 55 mg/dL      Comment: Meter: [id number] : [id number] Edson MCELROY RN       Basic Metabolic Panel [[id number]]  (Abnormal) Collected: 06/30/22 0912    Specimen: Blood Updated: 06/30/22 1041     Glucose 91 mg/dL      BUN 43 mg/dL      Creatinine 2.61 mg/dL      Sodium 134 mmol/L      Potassium 5.3 mmol/L      Chloride 100 mmol/L      CO2 23.0 mmol/L      Calcium 7.6 mg/dL      BUN/Creatinine Ratio 16.5     Anion Gap 11.0 mmol/L      eGFR 21.0 mL/min/1.73      Comment: National Kidney Foundation and American Society of Nephrology (ASN) Task Force recommended calculation based on the Chronic Kidney Disease Epidemiology Collaboration (CKD-EPI) equation refit without adjustment for race.       Narrative:      GFR Normal >60  Chronic Kidney Disease <60  Kidney Failure <15      POC Glucose Once [[id number]]  (Abnormal) Collected: 06/30/22 1034    Specimen: Blood Updated: 06/30/22 1040     Glucose 43 mg/dL       Comment: Result Not Confirmed Meter: [id number] : [id number] Edson MCELROY AMY       Hepatic Function Panel [[id number]]  (Abnormal) Collected: 06/30/22 0912    Specimen: Blood Updated: 06/30/22 1031     Total Protein 5.9 g/dL      Albumin 2.60 g/dL      ALT (SGPT) 15 U/L      AST (SGOT) 30 U/L      Alkaline Phosphatase 426 U/L      Total Bilirubin 0.4 mg/dL      Bilirubin, Direct 0.3 mg/dL      Bilirubin, Indirect 0.1 mg/dL     Phosphorus [[id number]]  (Normal) Collected: 06/30/22 0912    Specimen: Blood Updated: 06/30/22 1031     Phosphorus 4.0 mg/dL     CBC & Differential [[id number]]  (Abnormal) Collected: 06/30/22 0912    Specimen: Blood Updated: 06/30/22 1010    Narrative:      The following orders were created for panel order CBC & Differential.  Procedure                               Abnormality         Status                     ---------                               -----------         ------                     CBC Auto Differential[[id number]]        Abnormal            Final result                 Please view results for these tests on the individual orders.    CBC Auto Differential [[id number]]  (Abnormal) Collected: 06/30/22 0912    Specimen: Blood Updated: 06/30/22 1010     WBC 19.91 10*3/mm3      RBC 3.25 10*6/mm3      Hemoglobin 8.5 g/dL      Hematocrit 27.2 %      MCV 83.7 fL      MCH 26.2 pg      MCHC 31.3 g/dL      RDW 18.1 %      RDW-SD 53.9 fl      MPV 10.0 fL      Platelets 315 10*3/mm3      Neutrophil % 81.8 %      Lymphocyte % 11.2 %      Monocyte % 5.4 %      Eosinophil % 0.2 %      Basophil % 0.1 %      Immature Grans % 1.3 %      Neutrophils, Absolute 16.30 10*3/mm3      Lymphocytes, Absolute 2.23 10*3/mm3      Monocytes, Absolute 1.07 10*3/mm3      Eosinophils, Absolute 0.03 10*3/mm3      Basophils, Absolute 0.02 10*3/mm3      Immature Grans, Absolute 0.26 10*3/mm3      nRBC 0.1 /100 WBC     POC Glucose Once [[id number]]  (Normal) Collected: 06/30/22 0631    Specimen: Blood  Updated: 06/30/22 0633     Glucose 117 mg/dL      Comment: Meter: [id number] : [id number] Smyzer Inge NA       POC Glucose Once [[id number]]  (Abnormal) Collected: 06/30/22 0310    Specimen: Blood Updated: 06/30/22 0311     Glucose 181 mg/dL      Comment: Meter: [id number] : [id number] Smyzer Inge NA       POC Glucose Once [[id number]]  (Abnormal) Collected: 06/29/22 2046    Specimen: Blood Updated: 06/29/22 2048     Glucose 219 mg/dL      Comment: Meter: [id number] : [id number] Smyzer Inge NA       POC Glucose Once [[id number]]  (Normal) Collected: 06/29/22 1901    Specimen: Blood Updated: 06/29/22 1902     Glucose 101 mg/dL      Comment: Meter: [id number] : [id number] Cricket Harley NA       POC Glucose Once [[id number]]  (Abnormal) Collected: 06/29/22 1842    Specimen: Blood Updated: 06/29/22 1844     Glucose 56 mg/dL      Comment: Meter: [id number] : [id number] Anup SAAVEDRA           Data Review:  Results from last 7 days   Lab Units 07/06/22  1135 07/05/22  0949 07/04/22  0704   SODIUM mmol/L 131* 133* 134*   POTASSIUM mmol/L 6.1* 5.1 6.1*   CHLORIDE mmol/L 98 97* 99   CO2 mmol/L 21.0* 25.0 25.0   BUN mg/dL 41* 28* 33*   CREATININE mg/dL 3.34* 2.45* 2.33*   GLUCOSE mg/dL 153* 189* 138*   CALCIUM mg/dL 7.7* 7.7* 7.4*     Results from last 7 days   Lab Units 07/06/22  1135 07/05/22  0949 07/04/22  0704   WBC 10*3/mm3 12.51* 15.65* 17.66*   HEMOGLOBIN g/dL 8.5* 8.4* 8.0*   HEMATOCRIT % 28.4* 27.9* 24.4*   PLATELETS 10*3/mm3 214 204 182             Lab Results   Lab Value Date/Time    TROPONINT 0.092 (C) 12/19/2021 1314    TROPONINT 0.117 (C) 11/30/2021 0153    TROPONINT 0.132 (C) 11/29/2021 2121         Results from last 7 days   Lab Units 07/06/22  1135 07/05/22  0949 07/04/22  0704   ALK PHOS U/L 634* 533* 390*   BILIRUBIN mg/dL 0.6 0.5 0.4   BILIRUBIN DIRECT mg/dL 0.3 0.2 0.2   ALT (SGPT) U/L 26 26 27   AST (SGOT) U/L 42* 35* 41*             Glucose   Date/Time Value  Ref Range Status   07/06/2022 1125 166 (H) 70 - 130 mg/dL Final     Comment:     Meter: [id number] : [id number] Cricket Harley    07/06/2022 0630 119 70 - 130 mg/dL Final     Comment:     Meter: [id number] : [id number] León Young    07/05/2022 2048 129 70 - 130 mg/dL Final     Comment:     Meter: [id number] : [id number] Traore [facility]   07/05/2022 1626 91 70 - 130 mg/dL Final     Comment:     Meter: [id number] : [id number] Carlton Thompson    07/05/2022 1126 228 (H) 70 - 130 mg/dL Final     Comment:     Meter: [id number] : [id number] Vincent Donna NA   07/05/2022 0629 178 (H) 70 - 130 mg/dL Final     Comment:     Meter: [id number] : [id number] [facility]   07/04/2022 2115 253 (H) 70 - 130 mg/dL Final     Comment:     Meter: [id number] : [id number] [facility]   07/04/2022 1611 104 70 - 130 mg/dL Final     Comment:     Meter: [id number] : [id number] Oitlia SAAVEDRA           Past Medical History:   Diagnosis Date   • Acute CVA (cerebrovascular accident) (HCC) 5/1/2022   • Acute on chronic diastolic CHF (congestive heart failure) ([facility]ly [facility])    • CAD (coronary artery disease) 12/20/2021   • Diabetes ([facility])    • Disease of thyroid gland    • GERD (gastroesophageal reflux disease)    • Hyperlipidemia 11/30/2018   • Hypertension    • Rheumatoid arthritis (HCC)        Assessment:  Active Hospital Problems    Diagnosis  POA   • 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        Plan:  Insulin same dose.  Continue same and monitor.  The trend is better.    Aris Isbell MD  7/6/2022  17:31 EDT

## 2022-07-07 LAB
ALBUMIN SERPL-MCNC: 2.8 G/DL (ref 3.5–5.2)
ALP SERPL-CCNC: 669 U/L (ref 39–117)
ALT SERPL W P-5'-P-CCNC: 23 U/L (ref 1–33)
ANION GAP SERPL CALCULATED.3IONS-SCNC: 12 MMOL/L (ref 5–15)
AST SERPL-CCNC: 39 U/L (ref 1–32)
BASOPHILS # BLD AUTO: 0.01 10*3/MM3 (ref 0–0.2)
BASOPHILS NFR BLD AUTO: 0.1 % (ref 0–1.5)
BILIRUB CONJ SERPL-MCNC: 0.3 MG/DL (ref 0–0.3)
BILIRUB INDIRECT SERPL-MCNC: 0.2 MG/DL
BILIRUB SERPL-MCNC: 0.5 MG/DL (ref 0–1.2)
BUN SERPL-MCNC: 27 MG/DL (ref 6–20)
BUN/CREAT SERPL: 10.1 (ref 7–25)
C DIFF TOX GENS STL QL NAA+PROBE: NEGATIVE
CALCIUM SPEC-SCNC: 7.7 MG/DL (ref 8.6–10.5)
CHLORIDE SERPL-SCNC: 96 MMOL/L (ref 98–107)
CO2 SERPL-SCNC: 24 MMOL/L (ref 22–29)
CREAT SERPL-MCNC: 2.68 MG/DL (ref 0.57–1)
DEPRECATED RDW RBC AUTO: 53.1 FL (ref 37–54)
EGFRCR SERPLBLD CKD-EPI 2021: 20.3 ML/MIN/1.73
EOSINOPHIL # BLD AUTO: 0.08 10*3/MM3 (ref 0–0.4)
EOSINOPHIL NFR BLD AUTO: 0.9 % (ref 0.3–6.2)
ERYTHROCYTE [DISTWIDTH] IN BLOOD BY AUTOMATED COUNT: 17.6 % (ref 12.3–15.4)
GLUCOSE BLDC GLUCOMTR-MCNC: 128 MG/DL (ref 70–130)
GLUCOSE BLDC GLUCOMTR-MCNC: 156 MG/DL (ref 70–130)
GLUCOSE BLDC GLUCOMTR-MCNC: 182 MG/DL (ref 70–130)
GLUCOSE BLDC GLUCOMTR-MCNC: 85 MG/DL (ref 70–130)
GLUCOSE BLDC GLUCOMTR-MCNC: 99 MG/DL (ref 70–130)
GLUCOSE SERPL-MCNC: 132 MG/DL (ref 65–99)
HCT VFR BLD AUTO: 25.3 % (ref 34–46.6)
HGB BLD-MCNC: 7.8 G/DL (ref 12–15.9)
IMM GRANULOCYTES # BLD AUTO: 0.04 10*3/MM3 (ref 0–0.05)
IMM GRANULOCYTES NFR BLD AUTO: 0.5 % (ref 0–0.5)
LYMPHOCYTES # BLD AUTO: 0.95 10*3/MM3 (ref 0.7–3.1)
LYMPHOCYTES NFR BLD AUTO: 11.3 % (ref 19.6–45.3)
MCH RBC QN AUTO: 25.7 PG (ref 26.6–33)
MCHC RBC AUTO-ENTMCNC: 30.8 G/DL (ref 31.5–35.7)
MCV RBC AUTO: 83.5 FL (ref 79–97)
MONOCYTES # BLD AUTO: 0.74 10*3/MM3 (ref 0.1–0.9)
MONOCYTES NFR BLD AUTO: 8.8 % (ref 5–12)
NEUTROPHILS NFR BLD AUTO: 6.62 10*3/MM3 (ref 1.7–7)
NEUTROPHILS NFR BLD AUTO: 78.4 % (ref 42.7–76)
NRBC BLD AUTO-RTO: 0 /100 WBC (ref 0–0.2)
PHOSPHATE SERPL-MCNC: 4.1 MG/DL (ref 2.5–4.5)
PLATELET # BLD AUTO: 173 10*3/MM3 (ref 140–450)
PMV BLD AUTO: 10.3 FL (ref 6–12)
POTASSIUM SERPL-SCNC: 4.3 MMOL/L (ref 3.5–5.2)
PROT SERPL-MCNC: 5.8 G/DL (ref 6–8.5)
RBC # BLD AUTO: 3.03 10*6/MM3 (ref 3.77–5.28)
SODIUM SERPL-SCNC: 132 MMOL/L (ref 136–145)
WBC NRBC COR # BLD: 8.44 10*3/MM3 (ref 3.4–10.8)

## 2022-07-07 PROCEDURE — 80076 HEPATIC FUNCTION PANEL: CPT | Performed by: PHYSICAL MEDICINE & REHABILITATION

## 2022-07-07 PROCEDURE — 97535 SELF CARE MNGMENT TRAINING: CPT

## 2022-07-07 PROCEDURE — 82962 GLUCOSE BLOOD TEST: CPT

## 2022-07-07 PROCEDURE — 85025 COMPLETE CBC W/AUTO DIFF WBC: CPT | Performed by: PHYSICAL MEDICINE & REHABILITATION

## 2022-07-07 PROCEDURE — 63710000001 INSULIN LISPRO (HUMAN) PER 5 UNITS: Performed by: HOSPITALIST

## 2022-07-07 PROCEDURE — 84100 ASSAY OF PHOSPHORUS: CPT | Performed by: INTERNAL MEDICINE

## 2022-07-07 PROCEDURE — 87493 C DIFF AMPLIFIED PROBE: CPT | Performed by: PHYSICAL MEDICINE & REHABILITATION

## 2022-07-07 PROCEDURE — 80048 BASIC METABOLIC PNL TOTAL CA: CPT | Performed by: INTERNAL MEDICINE

## 2022-07-07 RX ORDER — GABAPENTIN 100 MG/1
100 CAPSULE ORAL EVERY 24 HOURS
Status: CANCELLED | OUTPATIENT
Start: 2022-07-07

## 2022-07-07 RX ORDER — OXYCODONE HYDROCHLORIDE 5 MG/1
5 TABLET ORAL EVERY 4 HOURS PRN
Status: CANCELLED | OUTPATIENT
Start: 2022-07-07 | End: 2022-07-12

## 2022-07-07 RX ORDER — ROPINIROLE 0.5 MG/1
0.75 TABLET, FILM COATED ORAL NIGHTLY
Status: CANCELLED | OUTPATIENT
Start: 2022-07-07

## 2022-07-07 RX ORDER — HYDRALAZINE HYDROCHLORIDE 50 MG/1
100 TABLET, FILM COATED ORAL EVERY 8 HOURS SCHEDULED
Status: CANCELLED | OUTPATIENT
Start: 2022-07-07

## 2022-07-07 RX ORDER — LOSARTAN POTASSIUM 100 MG/1
100 TABLET ORAL
Status: CANCELLED | OUTPATIENT
Start: 2022-07-08

## 2022-07-07 RX ORDER — CARVEDILOL 25 MG/1
25 TABLET ORAL 2 TIMES DAILY WITH MEALS
Status: CANCELLED | OUTPATIENT
Start: 2022-07-07

## 2022-07-07 RX ORDER — DEXTROSE MONOHYDRATE 25 G/50ML
25 INJECTION, SOLUTION INTRAVENOUS
Status: CANCELLED | OUTPATIENT
Start: 2022-07-07

## 2022-07-07 RX ORDER — GABAPENTIN 300 MG/1
300 CAPSULE ORAL 2 TIMES DAILY
Status: CANCELLED | OUTPATIENT
Start: 2022-07-07

## 2022-07-07 RX ORDER — PANTOPRAZOLE SODIUM 40 MG/1
40 TABLET, DELAYED RELEASE ORAL
Status: CANCELLED | OUTPATIENT
Start: 2022-07-08

## 2022-07-07 RX ORDER — FOLIC ACID/VIT B COMPLEX AND C 0.8 MG
1 TABLET ORAL DAILY
Status: CANCELLED | OUTPATIENT
Start: 2022-07-08

## 2022-07-07 RX ORDER — SODIUM CHLORIDE 0.9 % (FLUSH) 0.9 %
10 SYRINGE (ML) INJECTION AS NEEDED
Status: CANCELLED | OUTPATIENT
Start: 2022-07-07

## 2022-07-07 RX ORDER — NICOTINE POLACRILEX 4 MG
15 LOZENGE BUCCAL
Status: CANCELLED | OUTPATIENT
Start: 2022-07-07

## 2022-07-07 RX ORDER — DIPHENOXYLATE HYDROCHLORIDE AND ATROPINE SULFATE 2.5; .025 MG/1; MG/1
1 TABLET ORAL
Status: CANCELLED | OUTPATIENT
Start: 2022-07-07

## 2022-07-07 RX ORDER — ZOLPIDEM TARTRATE 5 MG/1
2.5 TABLET ORAL NIGHTLY PRN
Status: CANCELLED | OUTPATIENT
Start: 2022-07-07 | End: 2022-07-12

## 2022-07-07 RX ORDER — ASPIRIN 81 MG/1
81 TABLET ORAL DAILY
Status: CANCELLED | OUTPATIENT
Start: 2022-07-08

## 2022-07-07 RX ORDER — TAMSULOSIN HYDROCHLORIDE 0.4 MG/1
0.4 CAPSULE ORAL DAILY
Status: CANCELLED | OUTPATIENT
Start: 2022-07-08

## 2022-07-07 RX ORDER — HYDRALAZINE HYDROCHLORIDE 10 MG/1
10 TABLET, FILM COATED ORAL EVERY 8 HOURS PRN
Status: CANCELLED | OUTPATIENT
Start: 2022-07-07

## 2022-07-07 RX ORDER — LIDOCAINE 50 MG/G
1 PATCH TOPICAL
Status: CANCELLED | OUTPATIENT
Start: 2022-07-08

## 2022-07-07 RX ORDER — NITROGLYCERIN 0.4 MG/1
0.4 TABLET SUBLINGUAL
Status: CANCELLED | OUTPATIENT
Start: 2022-07-07

## 2022-07-07 RX ORDER — OXYCODONE HYDROCHLORIDE 5 MG/1
2.5 TABLET ORAL EVERY 4 HOURS PRN
Status: CANCELLED | OUTPATIENT
Start: 2022-07-07

## 2022-07-07 RX ORDER — ACETAMINOPHEN 500 MG
500 TABLET ORAL EVERY 4 HOURS PRN
Status: CANCELLED | OUTPATIENT
Start: 2022-07-07

## 2022-07-07 RX ORDER — HEPARIN SODIUM 1000 [USP'U]/ML
3800 INJECTION, SOLUTION INTRAVENOUS; SUBCUTANEOUS AS NEEDED
Status: CANCELLED | OUTPATIENT
Start: 2022-07-07

## 2022-07-07 RX ORDER — CLONIDINE HYDROCHLORIDE 0.1 MG/1
0.2 TABLET ORAL EVERY 12 HOURS SCHEDULED
Status: CANCELLED | OUTPATIENT
Start: 2022-07-07

## 2022-07-07 RX ORDER — INSULIN LISPRO 100 [IU]/ML
0-7 INJECTION, SOLUTION INTRAVENOUS; SUBCUTANEOUS
Status: CANCELLED | OUTPATIENT
Start: 2022-07-07

## 2022-07-07 RX ORDER — LEVOTHYROXINE SODIUM 0.15 MG/1
150 TABLET ORAL
Status: CANCELLED | OUTPATIENT
Start: 2022-07-08

## 2022-07-07 RX ADMIN — DIPHENOXYLATE HYDROCHLORIDE AND ATROPINE SULFATE 1 TABLET: 2.5; .025 TABLET ORAL at 09:03

## 2022-07-07 RX ADMIN — OXYCODONE 5 MG: 5 TABLET ORAL at 06:19

## 2022-07-07 RX ADMIN — INSULIN LISPRO 2 UNITS: 100 INJECTION, SOLUTION INTRAVENOUS; SUBCUTANEOUS at 12:19

## 2022-07-07 RX ADMIN — MICONAZOLE NITRATE 1 APPLICATION: 2 CREAM TOPICAL at 20:43

## 2022-07-07 RX ADMIN — OXYCODONE 5 MG: 5 TABLET ORAL at 21:33

## 2022-07-07 RX ADMIN — TAMSULOSIN HYDROCHLORIDE 0.4 MG: 0.4 CAPSULE ORAL at 08:42

## 2022-07-07 RX ADMIN — PANTOPRAZOLE SODIUM 40 MG: 40 TABLET, DELAYED RELEASE ORAL at 05:14

## 2022-07-07 RX ADMIN — ZOLPIDEM TARTRATE 2.5 MG: 5 TABLET ORAL at 20:42

## 2022-07-07 RX ADMIN — GABAPENTIN 300 MG: 300 CAPSULE ORAL at 20:42

## 2022-07-07 RX ADMIN — CARVEDILOL 25 MG: 25 TABLET, FILM COATED ORAL at 08:42

## 2022-07-07 RX ADMIN — LIDOCAINE 1 PATCH: 50 PATCH CUTANEOUS at 08:41

## 2022-07-07 RX ADMIN — GABAPENTIN 100 MG: 100 CAPSULE ORAL at 17:12

## 2022-07-07 RX ADMIN — Medication 1 TABLET: at 08:42

## 2022-07-07 RX ADMIN — INSULIN LISPRO 2 UNITS: 100 INJECTION, SOLUTION INTRAVENOUS; SUBCUTANEOUS at 07:03

## 2022-07-07 RX ADMIN — HYDRALAZINE HYDROCHLORIDE 100 MG: 50 TABLET, FILM COATED ORAL at 20:42

## 2022-07-07 RX ADMIN — LOSARTAN POTASSIUM 100 MG: 100 TABLET, FILM COATED ORAL at 08:42

## 2022-07-07 RX ADMIN — ROPINIROLE HYDROCHLORIDE 0.75 MG: 0.5 TABLET, FILM COATED ORAL at 20:41

## 2022-07-07 RX ADMIN — CARVEDILOL 25 MG: 25 TABLET, FILM COATED ORAL at 17:25

## 2022-07-07 RX ADMIN — HYDRALAZINE HYDROCHLORIDE 100 MG: 50 TABLET, FILM COATED ORAL at 05:12

## 2022-07-07 RX ADMIN — CLONIDINE HYDROCHLORIDE 0.2 MG: 0.1 TABLET ORAL at 08:43

## 2022-07-07 RX ADMIN — CLONIDINE HYDROCHLORIDE 0.2 MG: 0.1 TABLET ORAL at 20:42

## 2022-07-07 RX ADMIN — OXYCODONE 5 MG: 5 TABLET ORAL at 17:12

## 2022-07-07 RX ADMIN — GABAPENTIN 300 MG: 300 CAPSULE ORAL at 08:41

## 2022-07-07 RX ADMIN — ASPIRIN 81 MG: 81 TABLET, COATED ORAL at 08:42

## 2022-07-07 RX ADMIN — SODIUM ZIRCONIUM CYCLOSILICATE 5 G: 5 POWDER, FOR SUSPENSION ORAL at 08:41

## 2022-07-07 RX ADMIN — MICONAZOLE NITRATE 1 APPLICATION: 2 CREAM TOPICAL at 08:43

## 2022-07-07 RX ADMIN — LEVOTHYROXINE SODIUM 150 MCG: 0.15 TABLET ORAL at 05:12

## 2022-07-07 RX ADMIN — INSULIN GLARGINE-YFGN 7 UNITS: 100 INJECTION, SOLUTION SUBCUTANEOUS at 07:04

## 2022-07-07 RX ADMIN — SERTRALINE 150 MG: 100 TABLET, FILM COATED ORAL at 08:42

## 2022-07-07 RX ADMIN — OXYCODONE 5 MG: 5 TABLET ORAL at 10:11

## 2022-07-07 NOTE — SIGNIFICANT NOTE
07/07/22 1443   OTHER   Discipline physical therapy assistant   Rehab Time/Intention   Session Not Performed patient unavailable for treatment;patient/family declined treatment  (In early PM pnt declined PT 2ary to increased pain LLE. Later in PM pnt off of unit for HD.)

## 2022-07-07 NOTE — THERAPY TREATMENT NOTE
Inpatient Rehabilitation - Occupational Therapy Treatment Note    [facility]     Patient Name: Zaina Martinez  : 1965  MRN: [id number]    Today's Date: 2022                 Admit Date: 2022         ICD-10-CM ICD-9-CM   1. Generalized weakness  R53.1 780.79   2. Diabetic muscle infarction ([facility])  E11.69 250.80    M62.20 728.89   3. Other insomnia  G47.09 780.52       Patient Active Problem List   Diagnosis   • Renal insufficiency   • Hypertensive disorder   • Hypothyroidism   • Type 2 diabetes mellitus with kidney complication, with long-term current use of insulin ([facility])   • Rheumatoid arthritis ([facility])   • Angioedema   • Esophageal dysmotility   • Anemia   • Medically noncompliant   • Myocardial infarction due to demand ischemia ([facility])   • Enteritis   • PRES (posterior reversible encephalopathy syndrome)   • Urine retention   • Klebsiella infection   • Superficial thrombophlebitis   • Generalized weakness   • ESRD (end stage renal disease) ([facility])   • CAD (coronary artery disease)   • Abnormal urinalysis   • Chronic diastolic CHF (congestive heart failure) ([facility])   • Pyelonephritis   • Calculus of gallbladder with acute on chronic cholecystitis without obstruction   • Pleural effusion on right   • Anemia due to chronic kidney disease, on chronic dialysis ([facility])   • Abnormal findings on diagnostic imaging of other specified body structures   • Acute upper respiratory infection   • Agitation   • Alkaline phosphatase raised   • Casts present in urine   • Cellulitis of toe   • Hip pain   • Community acquired pneumonia   • Depressive disorder   • Diarrhea of presumed infectious origin   • Difficult or painful urination   • Disease due to severe acute respiratory syndrome coronavirus 2 (SARS-CoV-2)   • Dyspnea   • Encounter for follow-up examination after completed treatment for conditions other than malignant neoplasm   • H/O: hypothyroidism   • Hyperlipidemia   • Hypomagnesemia   • Intractable vomiting with  nausea   • Leukocytosis   • Luetscher's syndrome   • Need for influenza vaccination   • Restless legs   • Noncompliance with treatment   • Shoulder pain   • Urinary tract infectious disease   • Metabolic encephalopathy   • Abnormal findings on diagnostic imaging of abdomen   • Status post cholecystectomy   • Hyponatremia   • Leukocytosis   • Acute metabolic encephalopathy   • Encephalopathy, toxic   • Acute CVA (cerebrovascular accident) ([facility])   • Intracranial hemorrhage (HCC)   • Stroke (HCC)   • Abnormal urinalysis   • Diabetic muscle infarction (HCC)   • Other insomnia       Past Medical History:   Diagnosis Date   • Acute CVA (cerebrovascular accident) (HCC) 5/1/2022   • Acute on chronic diastolic CHF (congestive heart failure) (HCC)    • CAD (coronary artery disease) 12/20/2021   • Diabetes (HCC)    • Disease of thyroid gland    • GERD (gastroesophageal reflux disease)    • Hyperlipidemia 11/30/2018   • Hypertension    • Rheumatoid arthritis (HCC)        Past Surgical History:   Procedure Laterality Date   • CHOLECYSTECTOMY WITH INTRAOPERATIVE CHOLANGIOGRAM N/A 1/10/2022    Procedure: Laparoscopic cholecystectomy with intraoperative cholangiogram;  Surgeon: Ramana Raygoza MD;  Location: [facility];  Service: General;  Laterality: N/A;   • EYE SURGERY     • HYSTERECTOMY     • INSERTION HEMODIALYSIS CATHETER N/A 12/6/2021    Procedure: HEMODIALYSIS CATHETER INSERTION;  Surgeon: Keli Salazar MD;  Location: [facility] 18/19;  Service: Vascular;  Laterality: N/A;   • INSERTION HEMODIALYSIS CATHETER N/A 5/3/2022    Procedure: TUNNELED CATHETER PLACEMENT;  Surgeon: Keli Salazar MD;  Location: [facility] OR;  Service: Vascular;  Laterality: N/A;   • MUSCLE BIOPSY Left 6/15/2022    Procedure: Left quadriceps muscle biopsy;  Surgeon: Marques Ma MD;  Location: [facility] OR;  Service: Neurosurgery;  Laterality: Left;             IRF OT ASSESSMENT FLOWSHEET (last 12 hours)     IRF OT  Evaluation and Treatment     [facility] Name 07/07/22 1513          OT Time and Intention    Document Type daily treatment  -AF     Mode of Treatment occupational therapy  -AF     Patient Effort poor  -AF     Row Name 07/07/22 1513          General Information    Patient/Family/Caregiver Comments/Observations pt sitting up in bed deferrred getting out of bed secondary to pain and swelling  -AF     General Observations of Patient pt states that she cant tolerate the therapy over here and feels like she like she needs to go back to the hosptial  -AF     Existing Precautions/Restrictions fall  contact precautions  -AF     Row Name 07/07/22 1513          Pain Assessment    Pretreatment Pain Rating 8/10  -AF     Posttreatment Pain Rating 9/10  -AF     Pain Location - Side/Orientation Left  -AF     Pain Location - groin  -AF     Row Name 07/07/22 1513          Cognition/Psychosocial    Affect/Mental Status (Cognition) flat/blunted affect  -AF     Orientation Status (Cognition) oriented x 3  -AF     Follows Commands (Cognition) follows one-step commands  -AF     Personal Safety Interventions fall prevention program maintained;gait belt;nonskid shoes/slippers when out of bed  -AF     Row Name 07/07/22 1513          Bathing    [place] Level (Bathing) bathing skills;upper body;lower body;moderate assist (50% patient effort)  -AF     Position (Bathing) supine;long sitting  -AF     Comment (Bathing) bed level deferred getting up today  -AF     Row Name 07/07/22 1513          Upper Body Dressing    [place] Level (Upper Body Dressing) upper body dressing skills;moderate assist (50-74% patient effort)  -AF     Position (Upper Body Dressing) sitting up in bed  -AF     Row Name 07/07/22 1513          Lower Body Dressing    [place] Level (Lower Body Dressing) socks;don;dependent (less than 25% patient effort)  -AF     Position (Lower Body Dressing) sitting up in bed  -AF     Row Name 07/07/22 1513          Grooming     [place] Level (Grooming) set up  -AF     Row Name 07/07/22 1513          Toileting    [place] Level (Toileting) toileting skills;dependent (less than 25% patient effort)  -AF     Position (Toileting) supine  -AF     Comment (Toileting) had loose large bowel movement in brief required A x 2 to change with increased time  -AF     Row Name 07/07/22 1513          Bed Mobility    Rolling Left [place] (Bed Mobility) minimum assist (75% patient effort)  -AF     Rolling Right [place] (Bed Mobility) minimum assist (75% patient effort)  -AF     Comment, (Bed Mobility) deferred  -AF     Row Name 07/07/22 1513          Positioning and Restraints    Post Treatment Position bed  -AF     In Bed supine;call light within reach;encouraged to call for assist;exit alarm on  -AF           User Key  (r) = Recorded By, (t) = Taken By, (c) = Cosigned By    Initials Name Effective Dates    AF Tasha Helm, OTR 06/16/21 -                  Occupational Therapy Education                 Title: PT OT SLP Therapies (In Progress)     Topic: Occupational Therapy (Resolved)     Point: ADL training (Resolved)     Description:   Instruct learner(s) on proper safety adaptation and remediation techniques during self care or transfers.   Instruct in proper use of assistive devices.              Learning Progress Summary           Patient Acceptance, E,TB,D, VU,NR by JS at 6/25/2022 1434      Show all documentation for this point (2)                 Point: Home exercise program (Resolved)     Description:   Instruct learner(s) on appropriate technique for monitoring, assisting and/or progressing therapeutic exercises/activities.              Learning Progress Summary           Patient Acceptance, E,D,H, VU by AF at 6/30/2022 1544    Comment: review of HEP with hand weights and theraband      Show all documentation for this point (2)                 Point: Precautions (Resolved)     Description:   Instruct learner(s) on prescribed  precautions during self-care and functional transfers.              Learning Progress Summary           Patient Acceptance, E,TB,D, VU,NR by  at 6/25/2022 1434      Show all documentation for this point (2)                 Point: Body mechanics (Resolved)     Description:   Instruct learner(s) on proper positioning and spine alignment during self-care, functional mobility activities and/or exercises.              Learning Progress Summary           Patient Acceptance, E,TB,D, VU,NR by  at 6/25/2022 1434      Show all documentation for this point (2)                             User Key     Initials Effective Dates Name Provider Type Discipline     06/16/21 -  Fany Lima, PT Physical Therapist PT    AF 06/16/21 -  Tasha Helm, OTR Occupational Therapist OT                    OT Recommendation and Plan                         Time Calculation:      Time Calculation- OT     Row Name 07/07/22 1518 07/07/22 1425          Time Calculation- OT    OT Start Time 0830  -AF --     OT Stop Time 0930  -AF --     OT Time Calculation (min) 60 min  -AF --     OT - Next Appointment -- 07/08/22  -           User Key  (r) = Recorded By, (t) = Taken By, (c) = Cosigned By    Initials Name Provider Type    AF Tasha Helm, OTR Occupational Therapist              Therapy Charges for Today     Code Description Service Date Service Provider Modifiers Qty    40356450525 HC OT SELF CARE/MGMT/TRAIN EA 15 MIN 7/6/2022 Tasha Helm, OTR GO 4    11319708858 HC OT SELF CARE/MGMT/TRAIN EA 15 MIN 7/6/2022 Tasha Helm, OTR GO 1    60730536503 HC OT THER PROC EA 15 MIN 7/6/2022 Tasha Helm, OTR GO 1    38108018635 HC OT SELF CARE/MGMT/TRAIN EA 15 MIN 7/7/2022 Tasha Helm, OTR GO 4                   RAMO Mike  7/7/2022

## 2022-07-07 NOTE — PLAN OF CARE
Goal Outcome Evaluation:  Plan of Care Reviewed With: patient        Progress: no change   Patient is alert and oriented. Cooperative. PRN pain medication given for left groin and left thigh pain. She takes her medication whole with thin liquids. Continued with loose stool this morning. Received order to re-check for C- diff toxin. Stool was collected and result is normal. Potassium level 4.3. Bilateral legs edematous. Nephrology placed order for dialysis today. Dr Isbell called in regard to patient not able to tolerate 3 hours of therapy, left leg pain and need of extra dialysis.  Patient left the unit for dialysis at 1306. Will continue to monitor.

## 2022-07-07 NOTE — PROGRESS NOTES
Inpatient Rehabilitation Plan of Care Note    Plan of Care  Care Plan Reviewed - No updates at this time.    Safety    Performed Intervention(s)  Fall precautions: bed low and locked, chair alarm and bed alarms in use, nonskid  socks and gait belt in use, call light and personal belongings within reach.  Hourly rounding.      Psychosocial    Performed Intervention(s)  Provide distraction and/or relaxation as needed.  Allow extra time for patient to verbalize needs, concerns, feelings, etc.      Body Systems    Performed Intervention(s)  Dialysis mwf  Monitor weight and I/O.  DM educator consult.  Accuchecks as ordered.      Sphincter Control    Performed Intervention(s)  Monitor I/O.  Bowel meds prn.      Pain    Performed Intervention(s)  Pain meds prn .  Ice to LLE per order    Signed by: Chrissy Cobb RN

## 2022-07-07 NOTE — SIGNIFICANT NOTE
07/07/22 1250   OTHER   Discipline physical therapy assistant   Rehab Time/Intention   Session Not Performed patient/family declined, not feeling well  (C/O increased swelling and pain L thigh.PT was attempted x 2.)

## 2022-07-07 NOTE — PROGRESS NOTES
[facility] Progress Note      Patient Name: Zaina Martinez  : 1965  MRN: [id number]  Primary Care Physician:  Karson Oreilly MD  Date of admission: 2022    Subjective     Interval History:   Follow up ESRD  Seen and examined.  Had dialysis yesterday with no issues.  Feels better.  No shortness of air or chest pain      Review of Systems:   As noted above    Objective     Vitals:   Temp:  [98 °F (36.7 °C)-98.7 °F (37.1 °C)] 98.7 °F (37.1 °C)  Heart Rate:  [57-63] 63  Resp:  [16-20] 16  BP: (137-186)/(71-84) 178/84    Intake/Output Summary (Last 24 hours) at 2022 0843  Last data filed at 2022 0800  Gross per 24 hour   Intake 800 ml   Output 3800 ml   Net -3000 ml       Physical Exam:   General: ALert NAD chronically ill  HEENT: AT/NC; periorbital edema noted  Neck: RIJ TDC  Lungs: clear to auscultation  Heart: RRR no s3 or rub. 2/6 systolic murmur  Abdomen: +bs, soft, not distended  Extremities: 1+ pitting edema bilateral extremities.  Neuro:  Moves all 4 ext.     Scheduled Meds:     aspirin, 81 mg, Oral, Daily  b complex-vitamin c-folic acid, 1 tablet, Oral, Daily  carvedilol, 25 mg, Oral, BID With Meals  cloNIDine, 0.2 mg, Oral, Q12H  epoetin brooke/brooke-epbx, 10,000 Units, Intravenous, Once per day on   gabapentin, 100 mg, Oral, Q24H  gabapentin, 300 mg, Oral, BID  hydrALAZINE, 100 mg, Oral, Q8H  insulin glargine, 7 Units, Subcutaneous, QAM  insulin lispro, 0-7 Units, Subcutaneous, TID AC  levothyroxine, 150 mcg, Oral, Q AM  lidocaine, 1 patch, Transdermal, Q24H  losartan, 100 mg, Oral, Q24H  miconazole, 1 application, Topical, Q12H  pantoprazole, 40 mg, Oral, Q AM  rOPINIRole, 0.75 mg, Oral, Nightly  sertraline, 150 mg, Oral, Daily  sodium zirconium cyclosilicate, 5 g, Oral, Daily  tamsulosin, 0.4 mg, Oral, Daily      IV Meds:        Results Reviewed:   I have personally reviewed the results from the time of this admission to 2022 08:43 EDT      Results from last 7 days   Lab Units 07/06/22  1135 07/05/22  0949 07/04/22  0704   SODIUM mmol/L 131* 133* 134*   POTASSIUM mmol/L 6.1* 5.1 6.1*   CHLORIDE mmol/L 98 97* 99   CO2 mmol/L 21.0* 25.0 25.0   BUN mg/dL 41* 28* 33*   CREATININE mg/dL 3.34* 2.45* 2.33*   CALCIUM mg/dL 7.7* 7.7* 7.4*   BILIRUBIN mg/dL 0.6 0.5 0.4   ALK PHOS U/L 634* 533* 390*   ALT (SGPT) U/L 26 26 27   AST (SGOT) U/L 42* 35* 41*   GLUCOSE mg/dL 153* 189* 138*       Estimated Creatinine Clearance: 17.9 mL/min (A) (by C-G formula based on SCr of 3.34 mg/dL (H)).    Results from last 7 days   Lab Units 07/06/22  1135 07/05/22  0949 07/04/22  0704   PHOSPHORUS mg/dL 5.7* 4.4 5.3*             Results from last 7 days   Lab Units 07/06/22  1135 07/05/22  0949 07/04/22  0704 07/03/22  0927 07/02/22  0828   WBC 10*3/mm3 12.51* 15.65* 17.66* 23.31* 21.35*   HEMOGLOBIN g/dL 8.5* 8.4* 8.0* 8.3* 8.4*   PLATELETS 10*3/mm3 214 204 182 206 234             Assessment / Plan     ASSESSMENT:    1.  ESRD, secondary to diabetic and hypertensive glomerulosclerosis; usual HD schedule is MWF.  Her volume status is generous but improving slowly  2.  Intracerebral bleed and altered mental status  3.  Infected TDC, s/p removal 5/1. Replaced. Concluded vancomycin with last dose given 5/26  4.  DM2    5.  Coronary artery disease  6.  Acute on chronic diastolic dysfunction  7.  Anemia of CKD, on long-acting ANDRAE as an outpatient. Trying to minimize transfusions unless Hgb less than 7. Iron panel in favor iron deficiency anemia.  8.  Hyponatremia.  Improved on dialysis  9. Hyperkalemia    PLAN:    1. Plan for hemodialysis again today.  Continue Lokelma as well with persistent hyperkalemia  2. Surveillance labs.          Ruthann Palmer MD  07/07/22  08:43 EDT    [facility]  [phone]

## 2022-07-07 NOTE — PROGRESS NOTES
Inpatient Rehabilitation Plan of Care Note    Plan of Care  Care Plan Reviewed - No updates at this time.    Body Systems    [RN] Genitourinary(Active)  Current Status(07/07/2022): Patient is a M,W,F HD patient with a tunneled  catheter to the R chest. Pt produces little urine.  pt oliguric. New cath order  6/20: straight cath daily and prn until cath residual < 300 ml x3 days.  Weekly Goal(07/14/2022): Pt will plan to transition to community dialysis.  Discharge Goal: Pt will transition to community dialysis.    [RN] Endocrine(Active)  Current Status(07/07/2022): Patient is a diabetic.  Weekly Goal(07/14/2022): Blood glucose will be controlled.  Discharge Goal: Patient will have medication regimen that she can manage at  home.    Performed Intervention(s)  Dialysis mwf  Monitor weight and I/O.  DM educator consult.  Accuchecks as ordered.      Pain    [RN] Pain Management(Active)  Current Status(07/07/2022): Pt has consistent pain to L groin/L medial upper  thigh . Muscle biopsy site to L thigh .  Weekly Goal(07/14/2022): Pain will be controlled.  Discharge Goal: Pt will have pain management regimen that she can follow at  home.    Performed Intervention(s)  Pain meds prn .  Ice to LLE per order      Psychosocial    [RN] Coping/Adjustment(Active)  Current Status(07/07/2022): Pt is A/Ox4. Pt is at increased risk for reduced  coping r/t hospitalization and comorbidities. Pt has been calling for assistance  appropriately as needed. Pt has supportive family.  Weekly Goal(07/14/2022): Pt will verbalize needs, feelings, concerns to staff as  needed.  Discharge Goal: Pt will verbalize adequate coping strategies to use at home.    Performed Intervention(s)  Provide distraction and/or relaxation as needed.  Allow extra time for patient to verbalize needs, concerns, feelings, etc.      Safety    [RN] Potential for Injury(Active)  Current Status(07/07/2022): Patient is at increased risk for falls/injury r/t  hospitalization  and generalized weakness.  Weekly Goal(07/14/2022): Patient will call appropriately for assistance as  needed.  Discharge Goal: Patient will remain free from falls/injury.    Performed Intervention(s)  Fall precautions: bed low and locked, chair alarm and bed alarms in use, nonskid  socks and gait belt in use, call light and personal belongings within reach.  Hourly rounding.      Sphincter Control    [RN] Bowel and bladder (Active)  Current Status(07/07/2022): Patient can be continent and is often incontinent of  stool. Patient is oliguric and is an HD patient. New cath order 6/20.  Weekly Goal(07/14/2022): Pt will be continent 75% of the time. Patient will have  BM at least q3 days or within normal patterns.  Discharge Goal: Patient will be continent 100% of the time.    Performed Intervention(s)  Monitor I/O.  Bowel meds prn.    Signed by: Mona Marie RN

## 2022-07-07 NOTE — PROGRESS NOTES
CC: Debility    SUBJECTIVE:    Patient with noticeably increased edema in the left thigh, left lower leg as well as in the right lower leg.  Continues with pain at the left thigh-no change-burning pain.  Limits her activities.  Does not describe any pain elsewhere.    Patient had hemodialysis yesterday and has plans for hemodialysis again today.    She has had multiple loose stool-5-over the past day.  She denies any abdominal pain or nausea.  Does not report anything different in her diet.    Denies any breathing complaints.  Does not describe any new weakness.  Has pain inhibition proximally in the left lower extremity.    Tolerated Ambien 2.5 mg dose last night.     Patient has not been able to participate in therapy for 3 hours a day.  Will ask internal medicine to assess for transfer to the acute care wing in the hospital           OBJECTIVE:  Vitals:    07/07/22 0842   BP: 143/77   Pulse: 61   Resp:    Temp:    SpO2:        GENERAL: NAD  MENTAL STATUS: Awake alert  HEENT:  NCAT  CARDIOVASCULAR: Sinus rhythm with occasional ectopy  CHEST:  hemodialysis access right chest  RESPIRATORY: Normal respirations.  Clear to auscultation without wheezes rales or rhonchi  ABDOMEN: Active bowel sounds, soft, nontender.  Does appear distended    EXTREMITIES: Left thigh edema persists.  Noticeably increased edema in the left thigh.  Also increased edema in the left lower leg as well as right lower leg  Has diffuse tenderness to palpation about the left thigh.  No tenderness of the left calf     Left quadricep muscle biopsy site-healed  No myoclonus.  NEUROLOGIC:    Motor testing not performed as on hemodialysis      Scheduled Meds:aspirin, 81 mg, Oral, Daily  b complex-vitamin c-folic acid, 1 tablet, Oral, Daily  carvedilol, 25 mg, Oral, BID With Meals  cloNIDine, 0.2 mg, Oral, Q12H  epoetin brooke/brooke-epbx, 10,000 Units, Intravenous, Once per day on Mon Wed Fri  gabapentin, 100 mg, Oral, Q24H  gabapentin, 300 mg, Oral,  BID  hydrALAZINE, 100 mg, Oral, Q8H  insulin glargine, 7 Units, Subcutaneous, QAM  insulin lispro, 0-7 Units, Subcutaneous, TID AC  levothyroxine, 150 mcg, Oral, Q AM  lidocaine, 1 patch, Transdermal, Q24H  losartan, 100 mg, Oral, Q24H  miconazole, 1 application, Topical, Q12H  pantoprazole, 40 mg, Oral, Q AM  rOPINIRole, 0.75 mg, Oral, Nightly  sertraline, 150 mg, Oral, Daily  sodium zirconium cyclosilicate, 5 g, Oral, Daily  tamsulosin, 0.4 mg, Oral, Daily      Continuous Infusions:   PRN Meds:.•  acetaminophen  •  dextrose  •  dextrose  •  diphenoxylate-atropine  •  glucagon (human recombinant)  •  heparin (porcine)  •  hydrALAZINE  •  nitroglycerin  •  oxyCODONE  •  oxyCODONE  •  sodium chloride  •  zolpidem    Glucose   Date/Time Value Ref Range Status   07/07/2022 0627 182 (H) 70 - 130 mg/dL Final     Comment:     Meter: [id number] : [id number] Otilia SAAVEDRA   07/07/2022 0259 99 70 - 130 mg/dL Final     Comment:     Meter: [id number] : [id number] Otilia SAAVEDRA   07/06/2022 2129 168 (H) 70 - 130 mg/dL Final     Comment:     Meter: [id number] : [id number] Carlton SAAVEDRA   07/06/2022 1813 73 70 - 130 mg/dL Final     Comment:     Meter: [id number] : [id number] Reza Lowe RN   07/06/2022 1740 54 (L) 70 - 130 mg/dL Final     Comment:     Meter: [id number] : [id number] Cricket SAAVEDRA   07/06/2022 1125 166 (H) 70 - 130 mg/dL Final     Comment:     Meter: [id number] : [id number] Cricket SAAVEDRA   07/06/2022 0630 119 70 - 130 mg/dL Final     Comment:     Meter: [id number] : [id number] León SAAVEDRA   07/05/2022 2048 129 70 - 130 mg/dL Final     Comment:     Meter: [id number] : [id number] León SAAVEDRA     Results from last 7 days   Lab Units 07/06/22  1135 07/05/22  0949 07/04/22  0704   WBC 10*3/mm3 12.51* 15.65* 17.66*   HEMOGLOBIN g/dL 8.5* 8.4* 8.0*   HEMATOCRIT % 28.4* 27.9* 24.4*   PLATELETS 10*3/mm3 214 204 182     Results from last 7 days    Lab Units 07/06/22  1135 07/05/22  0949 07/04/22  0704   SODIUM mmol/L 131* 133* 134*   POTASSIUM mmol/L 6.1* 5.1 6.1*   CHLORIDE mmol/L 98 97* 99   CO2 mmol/L 21.0* 25.0 25.0   BUN mg/dL 41* 28* 33*   CREATININE mg/dL 3.34* 2.45* 2.33*   CALCIUM mg/dL 7.7* 7.7* 7.4*   BILIRUBIN mg/dL 0.6 0.5 0.4   ALK PHOS U/L 634* 533* 390*   ALT (SGPT) U/L 26 26 27   AST (SGOT) U/L 42* 35* 41*   GLUCOSE mg/dL 153* 189* 138*      Latest Reference Range & Units 05/27/22 11:30   Creatine Kinase 20 - 180 U/L 94   Aldolase 3.3 - 10.3 U/L 5.6       Imaging Results (Last 24 Hours)     ** No results found for the last 24 hours. **          ASSESSMENT AND PLAN    # R MCA s/p TPA with subsequent ICH with debility  Initially held antiplatelet/anticoagulation.  Reviewed with neurology on May 20 and resume aspirin 81 mg daily  SBP goal <160  Recommend outpatient Neurology fu - repeat MRI in 3 months        # DM  June 27-hyperglycemia on steroids.  Lantus increased to 30 units twice daily, Humalog 5 units 3 times daily with meals, and all increase sliding scale coverage  June 28 -hypoglycemic after increasing insulin dosing.  Blood glucose up to 98 prior to lunch, will hold sliding scale insulin and give 5 units scheduled with meal  June 30-hypoglycemia-Lantus twice daily decreased from 35 to 20 units.  Scheduled Humalog with meals discontinued  July 1-prednisone has been discontinued.  Reviewed with internal medicine and Lantus decreased to 10 units twice daily and on discharge home to resume her home regimen of Lantus 10 units daily.  She will check her sugar tonight at home and if elevated give Lantus 10 units tonight and then continue with the Lantus 10 units daily tomorrow.  Reviewed with patient and her  that her sugars may be elevated initially as she just completed prednisone.  They were instructed to call for any additional instructions on adjustment in regimen if it remains elevated at home this weekend  July 6-Lantus 7  units daily  July 7-blood sugar range 394-257- yesterday,  this AM     # ESRD   Nephrology following  Inpatient HD    Flomax  Hyperkalemia  July 5-patient had hemodialysis on July 3 and July 4 for hyperkalemia.  Lokelma resumed by nephrology  July 7-had hemodialysis yesterday and has plans for hemodialysis again today     Infectious disease-   # s/p catheter infection with MRSA  Vancomycin IV with stop date of May 26  May 24-vancomycin random equal 12.90-on vancomycin 500 mg IV on Mjecpre-Rrtskqph-Rvgngfwc  May 26-to complete course of vancomycin today  #Urinary tract infection-on Dana 3 urinalysis was negative for infection but noted to have leukocytosis increase and repeat urinalysis on June 6 shows findings consistent with urinary tract infection.  Placed on a course of cefdinir renal dose 3 mg daily for 7 days.  No costophrenic angle tenderness patient reviewed with infectious disease service.  Leukocytosis felt related to the bladder infection and not previous catheter infection.  June 8-urine culture results pending.  On cefdinir.  Culture with E. coli, sensitivity pending  June 9 - E coli sensitive to cephalosporins.  June 21 - continues with leukocytosis WBC  15K today. May be from inflammatory process. Steroids added yesterday.   June 22-urine described as milky characteristic, different from previous-recheck urinalysis with culture if indicated  June 23-Labs are still pending today.  Urinalysis also pending as she does not make much urine.  She had a temperature of 100.2 last evening, afebrile this morning.  June 27 - abnormal UA but urine culture from June 24 no growth  July 1-leukocytosis felt related to the noninfectious process in the left thigh as well as secondary to steroids  July 5-WBC trending down to 15.6 today  July 6-WBC trending down to 12 K     #left hydroureteronephrosis-Dana 3-CT scan shows left hydro ureter nephrosis.  She had some previous dilation of the ureter on comparison  the past studies but increased today.  Bladder noted to be markedly distended.  Will do in and out cath now and daily to decompress bladder.  Addendum-intermittent cath for 600 cc.  June 4-once daily intermittent cath 450 cc.  June 5-seen by urology-Nguyễn catheter placed with plans to recheck hydroureter on renal ultrasound in 3 to 5 days.  June 6-Nguyễn catheter placed yesterday by urology, with only 170 cc out so far.  Patient reports did not note any change in her left groin pain after the in and out cath with decompression of the bladder..  June 8-reviewed with urology service-request noncontrast CT scan stone protocol to evaluate for resolution of the left hydronephrosis with urology planning to see tomorrow to review the films and then make recommendations, urology would recommend leaving the Nguyễn catheter in for the time being.  June 9 - improved left hydroureter on CT , Nguyễn discontinued.   July 1-to follow-up with urology as an outpatient with follow-up CT planned in August.  She occasionally required intermittent straight cath but this was very infrequent.  Home health nursing to follow.  Continues on Flomax  July 7-intermittent straight cath 400 cc       #Anemia-  EPO.  June 6-hemoglobin 7.8  June 21- hemoglobin 8.3  June 27 - hemoglobin 8.7  June 28 - hemoglobin 8.8  July 6-hemoglobin 8.5     Lymphadenopathy-evaluated by hematology oncology while here.  No significant change from scans earlier this year.  She is to repeat a scan in 3 months and follow-up with hematology oncology     #Elevated alkaline phosphatase  June 6-elevated alkaline phosphatase 770, up from 289 one week ago, 140 one month ago. Similar elevation in March, Feb even higher at 1,330 ( intra-abdominal fluid collection, underwent CT-guided aspiration  Revealed blood and cultures did not reveal any growth and therefore antibiotics  discontinued.  Surgery also did evaluate and signed off.), and elevated during admission in January ( She  was seen by general surgery who recommended and performed laparoscopic cholecystectomy during that admission).   ALT 70, AST 87, Total bilirubin 0.8. Ca 8.3.  ? If liver source or bone or due to an inflammatory response.  June 7-GGT also elevated.  Seen by gastroenterology.  [place] biliary source.  Liver ultrasound ordered  June 8-liver ultrasound was unremarkable  June 21 - patient declined liver biopsy.   June 27-gastroenterology following peripherally-plans to follow-up as an outpatient and review option of liver biopsy again if alkaline phosphatase remains elevated  June 28-remains elevated at 503  June 30-alkaline phosphatase lower at 426  July 6-trend back up to 634     #Pain - neuropathy BL lower extremity/left thigh pain  Gabapentin/oxycodone.    June 7-patient with lethargy this morning.  May be due to urinary tract infection but with recent increase and gabapentin and oxycodone, will change gabapentin to 200 mg twice a day and decrease oxycodone from 5 back down to 2.5 mg p.o. every 4 hours as needed  June 8-better speed with her processing today  June 21 - pain med regimen recently adjusted with tramadol 25 mg q 4 hours prn, gabapentin 300 g bid, lidoderm patch, oxycodone 2.5 mg q 6 hours prn. Prednisone 60 mg daily added which may help with pain from inflammatory process. Reports pain better today and participated better in therapies.   June 27 - continue present regimen  July 1-home on oxycodone 5 mg p.o. every 4 hours.  Pain dispense #40, gabapentin 300 mg twice daily, Tylenol 500 mg every 6 hours as needed  July 5-increase gabapentin slightly 300-100-300 mg.  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  Seen by infectious disease service with no acute infectious process noted.  Evaluated by hematology regarding lymphadenopathy, lymph node felt too small to biopsy.  Patient was on a course of low-dose prednisone 10 mg for 3 days then 5 mg for 2 days and then another 10 mg dose with out any significant improvement in the swelling or pain.  She has a history of rheumatoid arthritis.  See CT of the left thigh and MRI of the left thigh and pelvis reports   With lumbar radiculoplexitis and diabetic amyotrophy, on review of the literature do not see edema that she presents with associated with the findings or MRI changes in the tendon and musculature with edema.  There is descriptions of MRI changes in the plexus and peripheral nerve.  In terms of treatment options for diabetic amyotrophy , there have not been definitive clinical trials.  Immunomodulation with steroids has been inconclusive as well as other immunomodulation therapy.  Reviewed with the patient  that  feel that she would benefit from seeing her rheumatologist as an outpatient to evaluate this further, as there does appear to be inflammatory cause given the lymphadenopathy and edema.  Rheumatologist does not come to the hospital.  CPK x2 has been normal.  Aldolase has been normal.  May need to look at biopsy of left thigh muscle to assess further.   June 8-patient reviewed with neurology yesterday who will assess and also provide input regarding whether muscle biopsy may be helpful.  June 9 - Discussed with Neurology and Rheumatology - plan is for EMG and then muscle biopsy.   Dana 15 - Left quadricep muscle biopsy was completed 6/15, results pending.  Labs: CKs have been normal, ANCA panel 6/11 was unremarkable  June 21 - started on full treatment dose of Prednisone 60 mg daily yesterday pending path  results. Reports pain better so far today. Specimen sent to [facility]. Clinical impression presently focal inflammatory myositis pending final pathology results.   June 22-pain continues better today.  June 23-outside pathology report still pending  June 24 - Patient reports left thigh pain better over past couple days but still present. Does not have the soreness at medial thigh as before. Complains of pain at mid-thigh. No change in swelling. Does have discomfort with proximal movement in LLE. Walked 320 feet CTG SBA RW today.   Pain improved on steroid. Discontinued atorvastatin. Biopsy results returned from [facility] - see report -Type 2 fiber atrophy, advanced. Denervation/reinnervation. No evidence of inflammatory myopathy or vasculitis. [facility] lab pursuing a dystrophy panel and will report findings in an addendum.  Reviewed with Neurology - as shown symptomatic response, continue Prednisone 60 mg daily for about one more week, then taper off over month.   June 27 - continue present regimen  June 28-increased pain medication regimen to oxycodone 5 mg every 4 hours as needed for severe pain.   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 Treatment would be aspirin which she is already on. Discussed with Neurology and as been on Prednisone 60 mg for only 1.5 week, will stop and not do taper. Discussed with Internal Medicine who will adjust insulin.  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  Reviewed with the patient that treatment for this is aspirin which she is already on.  She may do walking activities but would avoid strenuous activities with left quadricep strengthening.  She may strengthen the other 3 extremities as tolerated.  Reviewed may take 3 months to resolve.  July 7-continues with left thigh pain.   She is noted to have increased edema in both lower extremities but particularly the left thigh compared to recent.  Lokelma has been associated with fluid retention which may explain increase edema in part.  Continues on aspirin for suspected diabetic muscular infarct.  No update report yet on additional studies for muscle biopsy.  Initial report was June 24 with additional studies planned    # HTN   Coreg  Clonidine  Hydralazine  Losartan  Nephrology/internal medicine following     #Hypothyroidism  Levothyroxine  June 9 - recheck TSH- addendum Dana 10- On Nov 30, 2021 , on Dec 2, 2021 T4 = 0.90, on Dec 9, T4=1.68. On May 7, TSH = 134. Has been on Levothyroxine 125 mcg/day it appears since at least Dec 13, 2021. See Dr Templeton's discharge note from Dec 17,2021 which discusses thyroid labs/dosing at that time. On June 9, 2022 TSH = 54.4.   Check free T4. Will get evaluation from Internal Medicine regarding thyroid studies/levothyroxine dosing.  June 11 - levothyroxine increased to 150mcg daily-continue this dose and she will need follow up TSH in 4-6 weeks from June 11 as an outpatient  July 5-repeat TSH and T4 ordered for Monday, July 11     #CAD  ASA held in setting of ICH - restart aspirin 81 mg daily on May 20, 2022 per Neurology     #Depression   Sertraline  June 28-sertraline dose increased  July 5-reviewed with psychiatry service-continue present regimen    Insomnia-improved on Ambien 5 mg  July 6-fatigue during the day-decrease Ambien 2.5 mg nightly and assess response  July 7-tolerated Ambien 2.5    #GERD   PPI     #Restless leg syndrome  Requip     #DVT prophylaxis-SCDs ordered but patient refusing.  Per neurology note May 11-continue off anticoagulation/antiplatelet for now. Restarted ASA 81 mg on May 20.  May 16-reviewed with patient and try venous foot compression devices  May 20-also not able to tolerate venous foot compression devices.  May 25-bilateral lower extremity venous duplex negative for  DVT    Diarrhea-July 7-loose stool x5 over the last day.  Lokelma is not associated with diarrhea.  C. difficile was negative on July 5.  Was on cefdinir from June 7 to June 13 and cefazolin one-time dose on Dana 15.  - will recheck C. difficile        -  comprehensive inpatient rehabilitation program working with PT, OT, SLP for a minimum of three hours per day, five days per week. - will monitor for progress     TEAM CONF - MAY 17- BED SBA. TRANSFER MIN. 4 STAIRS MIN. GAIT 160 FEET CTG RW. SLOW TO PROCESS. BATH MIN. LBD MIN. UBD MIN. GROOMING SET UP. TOILETING MIN . COGNITION OVERALL MILD DEFICITS. VISUAL IMPAIRMENT IMPACTS SOME TESTING. ESRD-HD. ANTIBIOTICS - VANCOMYCIN 10.90 YESTERDAY.  ELOS - 1-2 WEEKS.      TEAM CONF - MAY 24 - ALWAYS SO PROCESSING AFTER EACH DIALYSIS SESSION. AT HOME WOULD GO BACK TO HOME AND SLEEP. BED MIN ASSIST. TRANSFERS MIN. GAIT 80   FEET RW CTG. 4 STAIRS CTG. TOILET TRASNFERS AND SHOWER TRANSFERS MIN CTG. BATH CTG. LBD CTG. UBD SET UP. GROOMING SET UP. TOILETING CTG.   LEFT GROIN - ADDUCTOR PAIN - There is mild joint space narrowing involving both hips symmetrically. There are also some minimal degenerative changes involving both hips. The overall appearance is similar to the study of 12/16/2021. MODERATE WORD RETRIEVAL DEFICITS. IMPAIRED VISION AFFECTS HOME MANAGEMENT TASKS. HYPOGLLYCEMIA AFTER SSI .DECREASED TO 0-7 UNITS.   ELOS - ONE WEEK.         TEAM CONF - MAY 31 - TRANSFERS CTG. GAIT 40- FEET CTG RW LIMITED BY THIGH PAIN. TOILET TRANSFERS CTG. BATH CTG. LBD CTG. UBD SET UP. TOILETING CTG.  GROOMING SET UP. COGNITION - MODERATE WORD RETRIEVAL DEFICITS, MODERATE IMPAIRED MEMORY IMPACTED BY FATIGUE, STILL SLOW PROCESSING.  BNE (Active)  Att'n. - Mildly Imp.  Exec. Fx. - Mildly Imp., slowed processing speed  Rsng/Jgmnt - WNL  Arith - Mod Imp.  Visuospatial Skills - DNA, pt declined citing poor vision  Visual Mem. DNA  Verbal Mem. - WNL  Emot - Pt endorsed mild dysphoria  and anxiousness secodnary to current inpatient  Stay.  CONTINENT BOWEL. OCCASIONAL VOID. ESRD-HD. ON INSULIN. SKIN INTACT. LIDODERM PATCH TO LEFT ADDUCTOR TENDON. COURSE OF STEROID FOR LEFT THIGH JEREMI. CONSULTED ORTHO FOR INPUT.  ELOS - POSSIBLY Thursday DEPENDING ON FURTHER EVALUATIONS.            TEAM CONF - JUNE 21 -  DECLINED THERAPY DUE TO PAIN.  AT MOST RECENT THERAPY WHEN PARTICIPATED, TRANSFERS MIN. GAIT 80 FEET MIN / CTG MIN ASSIST RW. TOILET TRANSFERS CTG. TOILETING CTG. MODERATE IMPAIRED VERBAL MEMORY. PAIN MANAGEMENT - MEDS ADJUSTED - OXYCODONE 2.5 MG Q 6 HOURS PRN, TRAMADOL 25 MG Q 4 HOURS. MUSCLE BIOPSY RESULTS PENDING. STARTING ON PREDNISONE 60 MG DAILY YESTERDAY. WILL NEED TO ADJUST INSULING, BLOOD GLUCOSE > 300 THIS AM. GASTROENTEROLOGY EVALUATING LIVER. PATIENT DECLINES LIVER BIOPSY.  HYPOTHYROID - levothyroxine increased to 150mcg daily-continue this dose and she will need follow up TSH in 4-6 weeks from June 11 as an outpatient  ELOS -   1.5 WEEKS     TEAM CONF - June 28 - BED MIN  SIT TO SUPINE, SBA SUPINE TO SIT. CAR TRANSFERS CTG. TRANSFERS MIN ASSIST. 4 STAIRS MIN. GAIT 240 FEET CTG SBA. TOILET TRANSFERS MIN. UBB SBA. LBB MIN WITH LLE. UBD SET UP. LBD MOD ASSIST LLE.   INSULIN ADJUSTED FOR ELEVATED BLOOD GLUCOSE ON STEROIDS. ON PREDNISONE 60 MG DAILY AND PLAN TO TAPER OVER NEXT MONTH . BIOPSY REPORT SENT TO OUTPATIENT RHEUMATOLOGIST YESTERDAY. ADDITIONAL STUDIES ON BIOPSY PENDING.   BLADDER SCAN 400 CC BUT ONLY 200 CC ON INTERMITTENT STRAIGHT CATH. LEFT QUAD MUSCLE BIOPSY SITE HEALING.  ESRD - HD.   ELOS - Thursday WITH HOME HEALTH PT, OT, AND NURSING FOR DIABETES AND MONITORING ON STEROIDS.   Addendum-was not able to do car transfer after hemodialysis on Friday, July 1 and continued with inpatient rehab course.    Rehabilitation-July 7-Patient has not been able to participate in therapy for 3 hours a day.  Will ask internal medicine to assess for transfer to the acute care wing in the  [facility]    Adonis Florentino MD       During rounds, used appropriate personal protective equipment including mask and gloves.  Additional gown if indicated.  Mask used was standard procedure mask. Appropriate PPE was worn during the entire visit.  Hand hygiene was completed before and after.

## 2022-07-07 NOTE — PLAN OF CARE
Goal Outcome Evaluation:  Plan of Care Reviewed With: patient             Problem: Rehabilitation ([facility]) Plan of Care  Goal: Plan of Care Review  Outcome: Ongoing, Progressing  Flowsheets (Taken 7/7/2022 0249)  Plan of Care Reviewed With: patient  Outcome Evaluation:  Zaina is alert and oriented x 4, can be forgetful at times. Takes meds whole PO with thin liquids. Continues on fluid restriction of 1500cc. LLE remains swollen and tender. PRN Vanessa 5mg administered. Pt had episode of watery stool at HS; bo care performed, cream applied to rash on lower back. Took ambien this evening. No unsafe behaviors. Sleeping well at this time. Call light within reach.

## 2022-07-07 NOTE — PROGRESS NOTES
"DAILY PROGRESS NOTE  [facility]    Patient Identification:  Name: Zaina Martinez  Age: 56 y.o.  Sex: female  :  1965  MRN: [id number]         Primary Care Physician: Karson Oreilly MD    Subjective:  Interval History:She complains of leg pain.  Better today. Getting dialysis    Objective:    Scheduled Meds:aspirin, 81 mg, Oral, Daily  b complex-vitamin c-folic acid, 1 tablet, Oral, Daily  carvedilol, 25 mg, Oral, BID With Meals  cloNIDine, 0.2 mg, Oral, Q12H  epoetin brooke/brooke-epbx, 10,000 Units, Intravenous, Once per day on   gabapentin, 100 mg, Oral, Q24H  gabapentin, 300 mg, Oral, BID  hydrALAZINE, 100 mg, Oral, Q8H  insulin glargine, 7 Units, Subcutaneous, QAM  insulin lispro, 0-7 Units, Subcutaneous, TID AC  levothyroxine, 150 mcg, Oral, Q AM  lidocaine, 1 patch, Transdermal, Q24H  losartan, 100 mg, Oral, Q24H  miconazole, 1 application, Topical, Q12H  pantoprazole, 40 mg, Oral, Q AM  rOPINIRole, 0.75 mg, Oral, Nightly  sertraline, 150 mg, Oral, Daily  sodium zirconium cyclosilicate, 5 g, Oral, Daily  tamsulosin, 0.4 mg, Oral, Daily      Continuous Infusions:     Vital signs in last 24 hours:  Temp:  [97.7 °F (36.5 °C)-98.7 °F (37.1 °C)] 97.7 °F (36.5 °C)  Heart Rate:  [59-63] 60  Resp:  [16-18] 16  BP: (133-186)/(66-84) 133/66    Intake/Output:    Intake/Output Summary (Last 24 hours) at 2022 1434  Last data filed at 2022 1250  Gross per 24 hour   Intake 720 ml   Output 3800 ml   Net -3080 ml       Exam:  /66 (BP Location: Right leg, Patient Position: Sitting)   Pulse 60   Temp 97.7 °F (36.5 °C) (Oral)   Resp 16   Ht 157.5 cm (62.01\")   Wt 75.5 kg (166 lb 7.2 oz)   SpO2 95%   BMI 30.44 kg/m²     General Appearance:    Alert, cooperative, no distress   Head:    Normocephalic, without obvious abnormality, atraumatic   Eyes:       Throat:   Lips, tongue, gums normal   Neck:   Supple, symmetrical, trachea midline, no JVD   Lungs:     Clear to auscultation " bilaterally, respirations unlabored   Chest Wall:    No tenderness or deformity    Heart:    Regular rate and rhythm, S1 and S2 normal, no murmur,no  Rub or gallop   Abdomen:     Soft, nontender, bowel sounds active, no masses, no organomegaly    Extremities:   Extremities normal, atraumatic, no cyanosis or edema   Pulses:      Skin:   Skin is warm and dry,  no rashes or palpable lesions   Neurologic:   no focal deficits noted      Lab Results (last 72 hours)     Procedure Component Value Units Date/Time    POC Glucose Once [[id number]]  (Normal) Collected: 07/02/22 1719    Specimen: Blood Updated: 07/02/22 1721     Glucose 102 mg/dL      Comment: Meter: [id number] : [id number] Smooth Miranda RN       POC Glucose Once [[id number]]  (Abnormal) Collected: 07/02/22 1631    Specimen: Blood Updated: 07/02/22 1635     Glucose 41 mg/dL      Comment: RN Notified R and V Meter: [id number] : [id number] Smooth Miranda RN       POC Glucose Once [[id number]]  (Normal) Collected: 07/02/22 1151    Specimen: Blood Updated: 07/02/22 1152     Glucose 97 mg/dL      Comment: Meter: [id number] : [id number] Randolph SAAVEDRA       POC Glucose Once [[id number]]  (Normal) Collected: 07/02/22 1024    Specimen: Blood Updated: 07/02/22 1025     Glucose 120 mg/dL      Comment: Meter: [id number] : [id number] Smooth Miranda RN       Basic Metabolic Panel [[id number]]  (Abnormal) Collected: 07/02/22 0828    Specimen: Blood Updated: 07/02/22 0933     Glucose 65 mg/dL      BUN 40 mg/dL      Creatinine 2.50 mg/dL      Sodium 135 mmol/L      Potassium 5.3 mmol/L      Chloride 98 mmol/L      CO2 24.0 mmol/L      Calcium 7.6 mg/dL      BUN/Creatinine Ratio 16.0     Anion Gap 13.0 mmol/L      eGFR 22.1 mL/min/1.73      Comment: National Kidney Foundation and American Society of Nephrology (ASN) Task Force recommended calculation based on the Chronic Kidney Disease Epidemiology Collaboration (CKD-EPI) equation refit without adjustment for race.        Narrative:      GFR Normal >60  Chronic Kidney Disease <60  Kidney Failure <15      Hepatic Function Panel [[id number]]  (Abnormal) Collected: 07/02/22 0828    Specimen: Blood Updated: 07/02/22 0931     Total Protein 5.5 g/dL      Albumin 2.80 g/dL      ALT (SGPT) 14 U/L      AST (SGOT) 28 U/L      Alkaline Phosphatase 380 U/L      Total Bilirubin 0.4 mg/dL      Bilirubin, Direct <0.2 mg/dL      Bilirubin, Indirect --     Comment: Unable to calculate       Phosphorus [[id number]]  (Abnormal) Collected: 07/02/22 0828    Specimen: Blood Updated: 07/02/22 0931     Phosphorus 4.6 mg/dL     CBC & Differential [[id number]]  (Abnormal) Collected: 07/02/22 0828    Specimen: Blood Updated: 07/02/22 0904    Narrative:      The following orders were created for panel order CBC & Differential.  Procedure                               Abnormality         Status                     ---------                               -----------         ------                     CBC Auto Differential[[id number]]        Abnormal            Final result                 Please view results for these tests on the individual orders.    CBC Auto Differential [[id number]]  (Abnormal) Collected: 07/02/22 0828    Specimen: Blood Updated: 07/02/22 0904     WBC 21.35 10*3/mm3      RBC 3.21 10*6/mm3      Hemoglobin 8.4 g/dL      Hematocrit 26.8 %      MCV 83.5 fL      MCH 26.2 pg      MCHC 31.3 g/dL      RDW 18.9 %      RDW-SD 56.6 fl      MPV 10.0 fL      Platelets 234 10*3/mm3      Neutrophil % 81.5 %      Lymphocyte % 10.6 %      Monocyte % 6.7 %      Eosinophil % 0.1 %      Basophil % 0.1 %      Immature Grans % 1.0 %      Neutrophils, Absolute 17.40 10*3/mm3      Lymphocytes, Absolute 2.27 10*3/mm3      Monocytes, Absolute 1.42 10*3/mm3      Eosinophils, Absolute 0.02 10*3/mm3      Basophils, Absolute 0.02 10*3/mm3      Immature Grans, Absolute 0.22 10*3/mm3      nRBC 0.0 /100 WBC     POC Glucose Once [[id number]]  (Normal) Collected: 07/02/22 0614     Specimen: Blood Updated: 07/02/22 0615     Glucose 92 mg/dL      Comment: Meter: [id number] : [id number] Locaid NA       POC Glucose Once [[id number]]  (Abnormal) Collected: 07/02/22 0253    Specimen: Blood Updated: 07/02/22 0254     Glucose 140 mg/dL      Comment: Meter: [id number] : [id number] Kushal Choe RN       POC Glucose Once [[id number]]  (Abnormal) Collected: 07/01/22 2036    Specimen: Blood Updated: 07/01/22 2037     Glucose 139 mg/dL      Comment: Meter: [id number] : [id number] Locaid NA       Hepatic Function Panel [[id number]]  (Abnormal) Collected: 07/01/22 1827    Specimen: Blood Updated: 07/01/22 1926     Total Protein 6.0 g/dL      Albumin 3.20 g/dL      ALT (SGPT) 18 U/L      AST (SGOT) 33 U/L      Alkaline Phosphatase 450 U/L      Total Bilirubin 0.5 mg/dL      Bilirubin, Direct 0.2 mg/dL      Bilirubin, Indirect 0.3 mg/dL     Basic Metabolic Panel [[id number]]  (Abnormal) Collected: 07/01/22 1827    Specimen: Blood Updated: 07/01/22 1926     Glucose 75 mg/dL      BUN 34 mg/dL      Creatinine 2.21 mg/dL      Sodium 134 mmol/L      Potassium 5.4 mmol/L      Chloride 97 mmol/L      CO2 24.0 mmol/L      Calcium 7.9 mg/dL      BUN/Creatinine Ratio 15.4     Anion Gap 13.0 mmol/L      eGFR 25.6 mL/min/1.73      Comment: National Kidney Foundation and American Society of Nephrology (ASN) Task Force recommended calculation based on the Chronic Kidney Disease Epidemiology Collaboration (CKD-EPI) equation refit without adjustment for race.       Narrative:      GFR Normal >60  Chronic Kidney Disease <60  Kidney Failure <15      Phosphorus [[id number]]  (Normal) Collected: 07/01/22 1827    Specimen: Blood Updated: 07/01/22 1926     Phosphorus 3.2 mg/dL     CBC & Differential [[id number]]  (Abnormal) Collected: 07/01/22 1827    Specimen: Blood Updated: 07/01/22 1909    Narrative:      The following orders were created for panel order CBC & Differential.  Procedure                                Abnormality         Status                     ---------                               -----------         ------                     CBC Auto Differential[[id number]]        Abnormal            Final result                 Please view results for these tests on the individual orders.    CBC Auto Differential [[id number]]  (Abnormal) Collected: 07/01/22 1827    Specimen: Blood Updated: 07/01/22 1909     WBC 24.21 10*3/mm3      RBC 3.60 10*6/mm3      Hemoglobin 9.4 g/dL      Hematocrit 30.0 %      MCV 83.3 fL      MCH 26.1 pg      MCHC 31.3 g/dL      RDW 19.0 %      RDW-SD 56.4 fl      MPV 10.3 fL      Platelets 288 10*3/mm3      Neutrophil % 94.6 %      Lymphocyte % 2.7 %      Monocyte % 1.4 %      Eosinophil % 0.0 %      Basophil % 0.1 %      Immature Grans % 1.2 %      Neutrophils, Absolute 22.89 10*3/mm3      Lymphocytes, Absolute 0.65 10*3/mm3      Monocytes, Absolute 0.34 10*3/mm3      Eosinophils, Absolute 0.00 10*3/mm3      Basophils, Absolute 0.03 10*3/mm3      Immature Grans, Absolute 0.30 10*3/mm3      nRBC 0.0 /100 WBC     POC Glucose Once [[id number]]  (Abnormal) Collected: 07/01/22 1606    Specimen: Blood Updated: 07/01/22 1607     Glucose 287 mg/dL      Comment: Meter: [id number] : [id number] Dierken Jessica L RN       POC Glucose Once [[id number]]  (Normal) Collected: 07/01/22 1246    Specimen: Blood Updated: 07/01/22 1247     Glucose 126 mg/dL      Comment: Meter: [id number] : [id number] Dierken Jessica L RN       POC Glucose Once [[id number]]  (Normal) Collected: 07/01/22 0639    Specimen: Blood Updated: 07/01/22 0641     Glucose 95 mg/dL      Comment: Meter: [id number] : [id number] [employer] CNA       POC Glucose Once [[id number]]  (Abnormal) Collected: 07/01/22 0208    Specimen: Blood Updated: 07/01/22 0210     Glucose 186 mg/dL      Comment: Meter: [id number] : [id number] Schneider Daliyan CNA       POC Glucose Once [[id number]]  (Abnormal) Collected: 06/30/22 2032    Specimen: Blood  Updated: 06/30/22 2034     Glucose 251 mg/dL      Comment: Meter: [id number] : [id number] Jennie Gilliam CNA       POC Glucose Once [[id number]]  (Abnormal) Collected: 06/30/22 1615    Specimen: Blood Updated: 06/30/22 1616     Glucose 197 mg/dL      Comment: Meter: [id number] : [id number] Makharadze Firuza NA       POC Glucose Once [[id number]]  (Normal) Collected: 06/30/22 1149    Specimen: Blood Updated: 06/30/22 1201     Glucose 100 mg/dL      Comment: Meter: [id number] : [id number] Makharadze Firuza NA       POC Glucose Once [[id number]]  (Abnormal) Collected: 06/30/22 1115    Specimen: Blood Updated: 06/30/22 1117     Glucose 68 mg/dL      Comment: Meter: [id number] : [id number] Makharadze Firuza NA       POC Glucose Once [[id number]]  (Abnormal) Collected: 06/30/22 1100    Specimen: Blood Updated: 06/30/22 1102     Glucose 55 mg/dL      Comment: Meter: [id number] : [id number] Makharadze Firuza NA       POC Glucose Once [[id number]]  (Abnormal) Collected: 06/30/22 1045    Specimen: Blood Updated: 06/30/22 1045     Glucose 55 mg/dL      Comment: Meter: [id number] : [id number] Edson MCELROY RN       Basic Metabolic Panel [[id number]]  (Abnormal) Collected: 06/30/22 0912    Specimen: Blood Updated: 06/30/22 1041     Glucose 91 mg/dL      BUN 43 mg/dL      Creatinine 2.61 mg/dL      Sodium 134 mmol/L      Potassium 5.3 mmol/L      Chloride 100 mmol/L      CO2 23.0 mmol/L      Calcium 7.6 mg/dL      BUN/Creatinine Ratio 16.5     Anion Gap 11.0 mmol/L      eGFR 21.0 mL/min/1.73      Comment: National Kidney Foundation and American Society of Nephrology (ASN) Task Force recommended calculation based on the Chronic Kidney Disease Epidemiology Collaboration (CKD-EPI) equation refit without adjustment for race.       Narrative:      GFR Normal >60  Chronic Kidney Disease <60  Kidney Failure <15      POC Glucose Once [[id number]]  (Abnormal) Collected: 06/30/22 1034    Specimen: Blood Updated: 06/30/22  1040     Glucose 43 mg/dL      Comment: Result Not Confirmed Meter: [id number] : [id number] Edson MCELROY AMY       Hepatic Function Panel [[id number]]  (Abnormal) Collected: 06/30/22 0912    Specimen: Blood Updated: 06/30/22 1031     Total Protein 5.9 g/dL      Albumin 2.60 g/dL      ALT (SGPT) 15 U/L      AST (SGOT) 30 U/L      Alkaline Phosphatase 426 U/L      Total Bilirubin 0.4 mg/dL      Bilirubin, Direct 0.3 mg/dL      Bilirubin, Indirect 0.1 mg/dL     Phosphorus [[id number]]  (Normal) Collected: 06/30/22 0912    Specimen: Blood Updated: 06/30/22 1031     Phosphorus 4.0 mg/dL     CBC & Differential [[id number]]  (Abnormal) Collected: 06/30/22 0912    Specimen: Blood Updated: 06/30/22 1010    Narrative:      The following orders were created for panel order CBC & Differential.  Procedure                               Abnormality         Status                     ---------                               -----------         ------                     CBC Auto Differential[[id number]]        Abnormal            Final result                 Please view results for these tests on the individual orders.    CBC Auto Differential [[id number]]  (Abnormal) Collected: 06/30/22 0912    Specimen: Blood Updated: 06/30/22 1010     WBC 19.91 10*3/mm3      RBC 3.25 10*6/mm3      Hemoglobin 8.5 g/dL      Hematocrit 27.2 %      MCV 83.7 fL      MCH 26.2 pg      MCHC 31.3 g/dL      RDW 18.1 %      RDW-SD 53.9 fl      MPV 10.0 fL      Platelets 315 10*3/mm3      Neutrophil % 81.8 %      Lymphocyte % 11.2 %      Monocyte % 5.4 %      Eosinophil % 0.2 %      Basophil % 0.1 %      Immature Grans % 1.3 %      Neutrophils, Absolute 16.30 10*3/mm3      Lymphocytes, Absolute 2.23 10*3/mm3      Monocytes, Absolute 1.07 10*3/mm3      Eosinophils, Absolute 0.03 10*3/mm3      Basophils, Absolute 0.02 10*3/mm3      Immature Grans, Absolute 0.26 10*3/mm3      nRBC 0.1 /100 WBC     POC Glucose Once [[id number]]  (Normal) Collected: 06/30/22 0631     Specimen: Blood Updated: 06/30/22 0633     Glucose 117 mg/dL      Comment: Meter: [id number] : [id number] Smyzer Inge NA       POC Glucose Once [[id number]]  (Abnormal) Collected: 06/30/22 0310    Specimen: Blood Updated: 06/30/22 0311     Glucose 181 mg/dL      Comment: Meter: [id number] : [id number] Smyzer Inge NA       POC Glucose Once [[id number]]  (Abnormal) Collected: 06/29/22 2046    Specimen: Blood Updated: 06/29/22 2048     Glucose 219 mg/dL      Comment: Meter: [id number] : [id number] Smyzer Inge NA       POC Glucose Once [[id number]]  (Normal) Collected: 06/29/22 1901    Specimen: Blood Updated: 06/29/22 1902     Glucose 101 mg/dL      Comment: Meter: [id number] : [id number] Cricket Harley NA       POC Glucose Once [[id number]]  (Abnormal) Collected: 06/29/22 1842    Specimen: Blood Updated: 06/29/22 1844     Glucose 56 mg/dL      Comment: Meter: [id number] : [id number] Anup SAAVEDRA           Data Review:  Results from last 7 days   Lab Units 07/07/22  1014 07/06/22  1135 07/05/22  0949   SODIUM mmol/L 132* 131* 133*   POTASSIUM mmol/L 4.3 6.1* 5.1   CHLORIDE mmol/L 96* 98 97*   CO2 mmol/L 24.0 21.0* 25.0   BUN mg/dL 27* 41* 28*   CREATININE mg/dL 2.68* 3.34* 2.45*   GLUCOSE mg/dL 132* 153* 189*   CALCIUM mg/dL 7.7* 7.7* 7.7*     Results from last 7 days   Lab Units 07/07/22  1014 07/06/22  1135 07/05/22  0949   WBC 10*3/mm3 8.44 12.51* 15.65*   HEMOGLOBIN g/dL 7.8* 8.5* 8.4*   HEMATOCRIT % 25.3* 28.4* 27.9*   PLATELETS 10*3/mm3 173 214 204             Lab Results   Lab Value Date/Time    TROPONINT 0.092 (C) 12/19/2021 1314    TROPONINT 0.117 (C) 11/30/2021 0153    TROPONINT 0.132 (C) 11/29/2021 2121         Results from last 7 days   Lab Units 07/07/22  1014 07/06/22  1135 07/05/22  0949   ALK PHOS U/L 669* 634* 533*   BILIRUBIN mg/dL 0.5 0.6 0.5   BILIRUBIN DIRECT mg/dL 0.3 0.3 0.2   ALT (SGPT) U/L 23 26 26   AST (SGOT) U/L 39* 42* 35*             Glucose    Date/Time Value Ref Range Status   07/07/2022 1053 156 (H) 70 - 130 mg/dL Final     Comment:     Meter: [id number] : [id number] Cricket Harley NA   07/07/2022 0627 182 (H) 70 - 130 mg/dL Final     Comment:     Meter: [id number] : [id number] Otilia Blanca NA   07/07/2022 0259 99 70 - 130 mg/dL Final     Comment:     Meter: [id number] : [id number] Otilia Blanca NA   07/06/2022 2129 168 (H) 70 - 130 mg/dL Final     Comment:     Meter: [id number] : [id number] Carlton Thompson NA   07/06/2022 1813 73 70 - 130 mg/dL Final     Comment:     Meter: [id number] : [id number] Reza Bahdonte REYES   07/06/2022 1740 54 (L) 70 - 130 mg/dL Final     Comment:     Meter: [id number] : [id number] Cricket Harley NA   07/06/2022 1125 166 (H) 70 - 130 mg/dL Final     Comment:     Meter: [id number] : [id number] Cricket Harley NA   07/06/2022 0630 119 70 - 130 mg/dL Final     Comment:     Meter: [id number] : [id number] [facility]           Past Medical History:   Diagnosis Date   • Acute CVA (cerebrovascular accident) (HCC) 5/1/2022   • Acute on chronic diastolic CHF (congestive heart failure) (HCC)    • CAD (coronary artery disease) 12/20/2021   • Diabetes (HCC)    • Disease of thyroid gland    • GERD (gastroesophageal reflux disease)    • Hyperlipidemia 11/30/2018   • Hypertension    • Rheumatoid arthritis (HCC)        Assessment:  Active Hospital Problems    Diagnosis  POA   • 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       Plan:  Insulin same dose.  Continue same and monitor.  The trend is better.  Pain better.  Don't think she is sick enough to admit to hospital.  HD per renal.    Aris Isbell MD  7/7/2022  14:34 EDT

## 2022-07-07 NOTE — SIGNIFICANT NOTE
07/07/22 1425   OTHER   Discipline occupational therapist   Rehab Time/Intention   Session Not Performed patient/family declined, not feeling well  (pt declined PM session, awaiting HD, having increased thigh swelling and pain.)   Recommendation   OT - Next Appointment 07/08/22

## 2022-07-07 NOTE — TELEPHONE ENCOUNTER
I scheduled pt for appt with Dr. Ma on 7/14/22. Her voice mail was full and I could not leave a message. I mailed appt reminder.

## 2022-07-07 NOTE — PROGRESS NOTES
Patient had hemodialysis yesterday and has plans for hemodialysis again today.  She has had multiple loose stool-5-over the past day.  Lokelma is not associated with diarrhea but is associated with fluid retention.  She has had increased edema in both legs.  Finished Vanc IV May 26. Received cefdinir June 7 to June 13 and cefazolin x 1 on Dana 15. .  Will re-check stool for C difficile (was negative on July 4).     Patient has not been able to participate in therapy for 3 hours a day.  Will ask internal medicine to assess for transfer to the acute care wing in the hospital    Adonis Florentino MD

## 2022-07-08 LAB
ALBUMIN SERPL-MCNC: 2.3 G/DL (ref 3.5–5.2)
ALP SERPL-CCNC: 627 U/L (ref 39–117)
ALT SERPL W P-5'-P-CCNC: 24 U/L (ref 1–33)
ANION GAP SERPL CALCULATED.3IONS-SCNC: 12.1 MMOL/L (ref 5–15)
AST SERPL-CCNC: 42 U/L (ref 1–32)
BASOPHILS # BLD AUTO: 0.02 10*3/MM3 (ref 0–0.2)
BASOPHILS NFR BLD AUTO: 0.2 % (ref 0–1.5)
BILIRUB CONJ SERPL-MCNC: 0.4 MG/DL (ref 0–0.3)
BILIRUB INDIRECT SERPL-MCNC: 0.2 MG/DL
BILIRUB SERPL-MCNC: 0.6 MG/DL (ref 0–1.2)
BUN SERPL-MCNC: 21 MG/DL (ref 6–20)
BUN/CREAT SERPL: 9.5 (ref 7–25)
CALCIUM SPEC-SCNC: 7.7 MG/DL (ref 8.6–10.5)
CHLORIDE SERPL-SCNC: 97 MMOL/L (ref 98–107)
CO2 SERPL-SCNC: 24.9 MMOL/L (ref 22–29)
CREAT SERPL-MCNC: 2.21 MG/DL (ref 0.57–1)
DEPRECATED RDW RBC AUTO: 53.2 FL (ref 37–54)
EGFRCR SERPLBLD CKD-EPI 2021: 25.6 ML/MIN/1.73
EOSINOPHIL # BLD AUTO: 0.11 10*3/MM3 (ref 0–0.4)
EOSINOPHIL NFR BLD AUTO: 1.3 % (ref 0.3–6.2)
ERYTHROCYTE [DISTWIDTH] IN BLOOD BY AUTOMATED COUNT: 17.8 % (ref 12.3–15.4)
GLUCOSE BLDC GLUCOMTR-MCNC: 122 MG/DL (ref 70–130)
GLUCOSE BLDC GLUCOMTR-MCNC: 129 MG/DL (ref 70–130)
GLUCOSE BLDC GLUCOMTR-MCNC: 169 MG/DL (ref 70–130)
GLUCOSE BLDC GLUCOMTR-MCNC: 176 MG/DL (ref 70–130)
GLUCOSE BLDC GLUCOMTR-MCNC: 201 MG/DL (ref 70–130)
GLUCOSE SERPL-MCNC: 161 MG/DL (ref 65–99)
HCT VFR BLD AUTO: 26.2 % (ref 34–46.6)
HGB BLD-MCNC: 8.1 G/DL (ref 12–15.9)
IMM GRANULOCYTES # BLD AUTO: 0.03 10*3/MM3 (ref 0–0.05)
IMM GRANULOCYTES NFR BLD AUTO: 0.4 % (ref 0–0.5)
LYMPHOCYTES # BLD AUTO: 0.84 10*3/MM3 (ref 0.7–3.1)
LYMPHOCYTES NFR BLD AUTO: 10 % (ref 19.6–45.3)
MCH RBC QN AUTO: 26 PG (ref 26.6–33)
MCHC RBC AUTO-ENTMCNC: 30.9 G/DL (ref 31.5–35.7)
MCV RBC AUTO: 84 FL (ref 79–97)
MONOCYTES # BLD AUTO: 0.74 10*3/MM3 (ref 0.1–0.9)
MONOCYTES NFR BLD AUTO: 8.8 % (ref 5–12)
NEUTROPHILS NFR BLD AUTO: 6.68 10*3/MM3 (ref 1.7–7)
NEUTROPHILS NFR BLD AUTO: 79.3 % (ref 42.7–76)
NRBC BLD AUTO-RTO: 0 /100 WBC (ref 0–0.2)
PHOSPHATE SERPL-MCNC: 3.8 MG/DL (ref 2.5–4.5)
PLATELET # BLD AUTO: 175 10*3/MM3 (ref 140–450)
PMV BLD AUTO: 10.1 FL (ref 6–12)
POTASSIUM SERPL-SCNC: 4.2 MMOL/L (ref 3.5–5.2)
PROT SERPL-MCNC: 5.9 G/DL (ref 6–8.5)
RBC # BLD AUTO: 3.12 10*6/MM3 (ref 3.77–5.28)
SODIUM SERPL-SCNC: 134 MMOL/L (ref 136–145)
WBC NRBC COR # BLD: 8.42 10*3/MM3 (ref 3.4–10.8)

## 2022-07-08 PROCEDURE — 80048 BASIC METABOLIC PNL TOTAL CA: CPT | Performed by: INTERNAL MEDICINE

## 2022-07-08 PROCEDURE — 63710000001 INSULIN LISPRO (HUMAN) PER 5 UNITS: Performed by: HOSPITALIST

## 2022-07-08 PROCEDURE — 84100 ASSAY OF PHOSPHORUS: CPT | Performed by: INTERNAL MEDICINE

## 2022-07-08 PROCEDURE — 97110 THERAPEUTIC EXERCISES: CPT

## 2022-07-08 PROCEDURE — 80076 HEPATIC FUNCTION PANEL: CPT | Performed by: PHYSICAL MEDICINE & REHABILITATION

## 2022-07-08 PROCEDURE — 85025 COMPLETE CBC W/AUTO DIFF WBC: CPT | Performed by: PHYSICAL MEDICINE & REHABILITATION

## 2022-07-08 PROCEDURE — 25010000002 HEPARIN (PORCINE) PER 1000 UNITS: Performed by: PHYSICAL MEDICINE & REHABILITATION

## 2022-07-08 PROCEDURE — 82962 GLUCOSE BLOOD TEST: CPT

## 2022-07-08 PROCEDURE — 25010000002 EPOETIN ALFA-EPBX 10000 UNIT/ML SOLUTION: Performed by: PHYSICAL MEDICINE & REHABILITATION

## 2022-07-08 RX ADMIN — PANTOPRAZOLE SODIUM 40 MG: 40 TABLET, DELAYED RELEASE ORAL at 05:47

## 2022-07-08 RX ADMIN — ROPINIROLE HYDROCHLORIDE 0.75 MG: 0.5 TABLET, FILM COATED ORAL at 20:22

## 2022-07-08 RX ADMIN — TAMSULOSIN HYDROCHLORIDE 0.4 MG: 0.4 CAPSULE ORAL at 08:22

## 2022-07-08 RX ADMIN — LOSARTAN POTASSIUM 100 MG: 100 TABLET, FILM COATED ORAL at 08:22

## 2022-07-08 RX ADMIN — ASPIRIN 81 MG: 81 TABLET, COATED ORAL at 08:22

## 2022-07-08 RX ADMIN — MICONAZOLE NITRATE 1 APPLICATION: 2 CREAM TOPICAL at 08:24

## 2022-07-08 RX ADMIN — LIDOCAINE 1 PATCH: 50 PATCH CUTANEOUS at 08:22

## 2022-07-08 RX ADMIN — EPOETIN ALFA-EPBX 10000 UNITS: 10000 INJECTION, SOLUTION INTRAVENOUS; SUBCUTANEOUS at 15:13

## 2022-07-08 RX ADMIN — HEPARIN SODIUM 3800 UNITS: 1000 INJECTION INTRAVENOUS; SUBCUTANEOUS at 15:15

## 2022-07-08 RX ADMIN — CARVEDILOL 25 MG: 25 TABLET, FILM COATED ORAL at 08:22

## 2022-07-08 RX ADMIN — CARVEDILOL 25 MG: 25 TABLET, FILM COATED ORAL at 17:37

## 2022-07-08 RX ADMIN — GABAPENTIN 300 MG: 300 CAPSULE ORAL at 08:22

## 2022-07-08 RX ADMIN — MICONAZOLE NITRATE 1 APPLICATION: 2 CREAM TOPICAL at 20:22

## 2022-07-08 RX ADMIN — OXYCODONE 5 MG: 5 TABLET ORAL at 09:38

## 2022-07-08 RX ADMIN — SODIUM ZIRCONIUM CYCLOSILICATE 5 G: 5 POWDER, FOR SUSPENSION ORAL at 08:23

## 2022-07-08 RX ADMIN — ZOLPIDEM TARTRATE 2.5 MG: 5 TABLET ORAL at 22:04

## 2022-07-08 RX ADMIN — HYDRALAZINE HYDROCHLORIDE 100 MG: 50 TABLET, FILM COATED ORAL at 05:47

## 2022-07-08 RX ADMIN — LEVOTHYROXINE SODIUM 150 MCG: 0.15 TABLET ORAL at 05:47

## 2022-07-08 RX ADMIN — OXYCODONE 5 MG: 5 TABLET ORAL at 14:43

## 2022-07-08 RX ADMIN — GABAPENTIN 300 MG: 300 CAPSULE ORAL at 20:22

## 2022-07-08 RX ADMIN — Medication 1 TABLET: at 08:22

## 2022-07-08 RX ADMIN — INSULIN LISPRO 2 UNITS: 100 INJECTION, SOLUTION INTRAVENOUS; SUBCUTANEOUS at 17:37

## 2022-07-08 RX ADMIN — SERTRALINE 150 MG: 100 TABLET, FILM COATED ORAL at 08:22

## 2022-07-08 RX ADMIN — CLONIDINE HYDROCHLORIDE 0.2 MG: 0.1 TABLET ORAL at 20:22

## 2022-07-08 RX ADMIN — INSULIN LISPRO 2 UNITS: 100 INJECTION, SOLUTION INTRAVENOUS; SUBCUTANEOUS at 11:44

## 2022-07-08 RX ADMIN — HYDRALAZINE HYDROCHLORIDE 100 MG: 50 TABLET, FILM COATED ORAL at 20:22

## 2022-07-08 RX ADMIN — OXYCODONE 5 MG: 5 TABLET ORAL at 05:47

## 2022-07-08 RX ADMIN — ACETAMINOPHEN 500 MG: 500 TABLET, FILM COATED ORAL at 08:22

## 2022-07-08 RX ADMIN — GABAPENTIN 100 MG: 100 CAPSULE ORAL at 14:43

## 2022-07-08 RX ADMIN — OXYCODONE 5 MG: 5 TABLET ORAL at 20:21

## 2022-07-08 RX ADMIN — CLONIDINE HYDROCHLORIDE 0.2 MG: 0.1 TABLET ORAL at 08:23

## 2022-07-08 RX ADMIN — INSULIN GLARGINE-YFGN 7 UNITS: 100 INJECTION, SOLUTION SUBCUTANEOUS at 08:21

## 2022-07-08 NOTE — PROGRESS NOTES
"DAILY PROGRESS NOTE  [facility]    Patient Identification:  Name: Zaina Martinez  Age: 56 y.o.  Sex: female  :  1965  MRN: [id number]         Primary Care Physician: Karson Oreilly MD    Subjective:  Interval History:She complains of leg pain.  Better today. Getting dialysis again    Objective:    Scheduled Meds:aspirin, 81 mg, Oral, Daily  b complex-vitamin c-folic acid, 1 tablet, Oral, Daily  carvedilol, 25 mg, Oral, BID With Meals  cloNIDine, 0.2 mg, Oral, Q12H  epoetin brooke/brooke-epbx, 10,000 Units, Intravenous, Once per day on   gabapentin, 100 mg, Oral, Q24H  gabapentin, 300 mg, Oral, BID  hydrALAZINE, 100 mg, Oral, Q8H  insulin glargine, 7 Units, Subcutaneous, QAM  insulin lispro, 0-7 Units, Subcutaneous, TID AC  levothyroxine, 150 mcg, Oral, Q AM  lidocaine, 1 patch, Transdermal, Q24H  losartan, 100 mg, Oral, Q24H  miconazole, 1 application, Topical, Q12H  pantoprazole, 40 mg, Oral, Q AM  rOPINIRole, 0.75 mg, Oral, Nightly  sertraline, 150 mg, Oral, Daily  sodium zirconium cyclosilicate, 5 g, Oral, Daily  tamsulosin, 0.4 mg, Oral, Daily      Continuous Infusions:     Vital signs in last 24 hours:  Temp:  [97.7 °F (36.5 °C)-99.1 °F (37.3 °C)] 97.7 °F (36.5 °C)  Heart Rate:  [54-62] 54  Resp:  [16-20] 20  BP: (141-177)/(67-78) 177/78    Intake/Output:    Intake/Output Summary (Last 24 hours) at 2022 1720  Last data filed at 2022 1638  Gross per 24 hour   Intake 480 ml   Output 2000 ml   Net -1520 ml       Exam:  /78   Pulse 54   Temp 97.7 °F (36.5 °C)   Resp 20   Ht 157.5 cm (62.01\")   Wt 75.5 kg (166 lb 7.2 oz)   SpO2 92%   BMI 30.44 kg/m²     General Appearance:    Alert, cooperative, no distress   Head:    Normocephalic, without obvious abnormality, atraumatic   Eyes:       Throat:   Lips, tongue, gums normal   Neck:   Supple, symmetrical, trachea midline, no JVD   Lungs:     Clear to auscultation bilaterally, respirations unlabored   Chest Wall:    " No tenderness or deformity    Heart:    Regular rate and rhythm, S1 and S2 normal, no murmur,no  Rub or gallop   Abdomen:     Soft, nontender, bowel sounds active, no masses, no organomegaly    Extremities:   Extremities normal, atraumatic, no cyanosis, improving edema in lower extremities   Pulses:      Skin:   Skin is warm and dry,  no rashes or palpable lesions   Neurologic:   no focal deficits noted      Lab Results (last 72 hours)     Procedure Component Value Units Date/Time    POC Glucose Once [[id number]]  (Normal) Collected: 07/02/22 1719    Specimen: Blood Updated: 07/02/22 1721     Glucose 102 mg/dL      Comment: Meter: [id number] : [id number] Smooth Miranda RN       POC Glucose Once [[id number]]  (Abnormal) Collected: 07/02/22 1631    Specimen: Blood Updated: 07/02/22 1635     Glucose 41 mg/dL      Comment: RN Notified R and V Meter: [id number] : [id number] Smooth Miranda RN       POC Glucose Once [[id number]]  (Normal) Collected: 07/02/22 1151    Specimen: Blood Updated: 07/02/22 1152     Glucose 97 mg/dL      Comment: Meter: [id number] : [id number] Randolph SAAVEDRA       POC Glucose Once [[id number]]  (Normal) Collected: 07/02/22 1024    Specimen: Blood Updated: 07/02/22 1025     Glucose 120 mg/dL      Comment: Meter: [id number] : [id number] Smooth Miranda RN       Basic Metabolic Panel [[id number]]  (Abnormal) Collected: 07/02/22 0828    Specimen: Blood Updated: 07/02/22 0933     Glucose 65 mg/dL      BUN 40 mg/dL      Creatinine 2.50 mg/dL      Sodium 135 mmol/L      Potassium 5.3 mmol/L      Chloride 98 mmol/L      CO2 24.0 mmol/L      Calcium 7.6 mg/dL      BUN/Creatinine Ratio 16.0     Anion Gap 13.0 mmol/L      eGFR 22.1 mL/min/1.73      Comment: National Kidney Foundation and American Society of Nephrology (ASN) Task Force recommended calculation based on the Chronic Kidney Disease Epidemiology Collaboration (CKD-EPI) equation refit without adjustment for race.       Narrative:      GFR  Normal >60  Chronic Kidney Disease <60  Kidney Failure <15      Hepatic Function Panel [[id number]]  (Abnormal) Collected: 07/02/22 0828    Specimen: Blood Updated: 07/02/22 0931     Total Protein 5.5 g/dL      Albumin 2.80 g/dL      ALT (SGPT) 14 U/L      AST (SGOT) 28 U/L      Alkaline Phosphatase 380 U/L      Total Bilirubin 0.4 mg/dL      Bilirubin, Direct <0.2 mg/dL      Bilirubin, Indirect --     Comment: Unable to calculate       Phosphorus [[id number]]  (Abnormal) Collected: 07/02/22 0828    Specimen: Blood Updated: 07/02/22 0931     Phosphorus 4.6 mg/dL     CBC & Differential [[id number]]  (Abnormal) Collected: 07/02/22 0828    Specimen: Blood Updated: 07/02/22 0904    Narrative:      The following orders were created for panel order CBC & Differential.  Procedure                               Abnormality         Status                     ---------                               -----------         ------                     CBC Auto Differential[[id number]]        Abnormal            Final result                 Please view results for these tests on the individual orders.    CBC Auto Differential [[id number]]  (Abnormal) Collected: 07/02/22 0828    Specimen: Blood Updated: 07/02/22 0904     WBC 21.35 10*3/mm3      RBC 3.21 10*6/mm3      Hemoglobin 8.4 g/dL      Hematocrit 26.8 %      MCV 83.5 fL      MCH 26.2 pg      MCHC 31.3 g/dL      RDW 18.9 %      RDW-SD 56.6 fl      MPV 10.0 fL      Platelets 234 10*3/mm3      Neutrophil % 81.5 %      Lymphocyte % 10.6 %      Monocyte % 6.7 %      Eosinophil % 0.1 %      Basophil % 0.1 %      Immature Grans % 1.0 %      Neutrophils, Absolute 17.40 10*3/mm3      Lymphocytes, Absolute 2.27 10*3/mm3      Monocytes, Absolute 1.42 10*3/mm3      Eosinophils, Absolute 0.02 10*3/mm3      Basophils, Absolute 0.02 10*3/mm3      Immature Grans, Absolute 0.22 10*3/mm3      nRBC 0.0 /100 WBC     POC Glucose Once [[id number]]  (Normal) Collected: 07/02/22 0614    Specimen: Blood  Updated: 07/02/22 0615     Glucose 92 mg/dL      Comment: Meter: [id number] : [id number] Famigo NA       POC Glucose Once [[id number]]  (Abnormal) Collected: 07/02/22 0253    Specimen: Blood Updated: 07/02/22 0254     Glucose 140 mg/dL      Comment: Meter: [id number] : [id number] Kushal Choe RN       POC Glucose Once [[id number]]  (Abnormal) Collected: 07/01/22 2036    Specimen: Blood Updated: 07/01/22 2037     Glucose 139 mg/dL      Comment: Meter: [id number] : [id number] Famigo NA       Hepatic Function Panel [[id number]]  (Abnormal) Collected: 07/01/22 1827    Specimen: Blood Updated: 07/01/22 1926     Total Protein 6.0 g/dL      Albumin 3.20 g/dL      ALT (SGPT) 18 U/L      AST (SGOT) 33 U/L      Alkaline Phosphatase 450 U/L      Total Bilirubin 0.5 mg/dL      Bilirubin, Direct 0.2 mg/dL      Bilirubin, Indirect 0.3 mg/dL     Basic Metabolic Panel [[id number]]  (Abnormal) Collected: 07/01/22 1827    Specimen: Blood Updated: 07/01/22 1926     Glucose 75 mg/dL      BUN 34 mg/dL      Creatinine 2.21 mg/dL      Sodium 134 mmol/L      Potassium 5.4 mmol/L      Chloride 97 mmol/L      CO2 24.0 mmol/L      Calcium 7.9 mg/dL      BUN/Creatinine Ratio 15.4     Anion Gap 13.0 mmol/L      eGFR 25.6 mL/min/1.73      Comment: National Kidney Foundation and American Society of Nephrology (ASN) Task Force recommended calculation based on the Chronic Kidney Disease Epidemiology Collaboration (CKD-EPI) equation refit without adjustment for race.       Narrative:      GFR Normal >60  Chronic Kidney Disease <60  Kidney Failure <15      Phosphorus [[id number]]  (Normal) Collected: 07/01/22 1827    Specimen: Blood Updated: 07/01/22 1926     Phosphorus 3.2 mg/dL     CBC & Differential [[id number]]  (Abnormal) Collected: 07/01/22 1827    Specimen: Blood Updated: 07/01/22 1909    Narrative:      The following orders were created for panel order CBC & Differential.  Procedure                                Abnormality         Status                     ---------                               -----------         ------                     CBC Auto Differential[[id number]]        Abnormal            Final result                 Please view results for these tests on the individual orders.    CBC Auto Differential [[id number]]  (Abnormal) Collected: 07/01/22 1827    Specimen: Blood Updated: 07/01/22 1909     WBC 24.21 10*3/mm3      RBC 3.60 10*6/mm3      Hemoglobin 9.4 g/dL      Hematocrit 30.0 %      MCV 83.3 fL      MCH 26.1 pg      MCHC 31.3 g/dL      RDW 19.0 %      RDW-SD 56.4 fl      MPV 10.3 fL      Platelets 288 10*3/mm3      Neutrophil % 94.6 %      Lymphocyte % 2.7 %      Monocyte % 1.4 %      Eosinophil % 0.0 %      Basophil % 0.1 %      Immature Grans % 1.2 %      Neutrophils, Absolute 22.89 10*3/mm3      Lymphocytes, Absolute 0.65 10*3/mm3      Monocytes, Absolute 0.34 10*3/mm3      Eosinophils, Absolute 0.00 10*3/mm3      Basophils, Absolute 0.03 10*3/mm3      Immature Grans, Absolute 0.30 10*3/mm3      nRBC 0.0 /100 WBC     POC Glucose Once [[id number]]  (Abnormal) Collected: 07/01/22 1606    Specimen: Blood Updated: 07/01/22 1607     Glucose 287 mg/dL      Comment: Meter: [id number] : [id number] Dierken Jessica L RN       POC Glucose Once [[id number]]  (Normal) Collected: 07/01/22 1246    Specimen: Blood Updated: 07/01/22 1247     Glucose 126 mg/dL      Comment: Meter: [id number] : [id number] Dierken Jessica L RN       POC Glucose Once [[id number]]  (Normal) Collected: 07/01/22 0639    Specimen: Blood Updated: 07/01/22 0641     Glucose 95 mg/dL      Comment: Meter: [id number] : [id number] "[employer]iRaftOut CNA       POC Glucose Once [[id number]]  (Abnormal) Collected: 07/01/22 0208    Specimen: Blood Updated: 07/01/22 0210     Glucose 186 mg/dL      Comment: Meter: [id number] : [id number] Schneider Daliyan CNA       POC Glucose Once [[id number]]  (Abnormal) Collected: 06/30/22 2032    Specimen: Blood  Updated: 06/30/22 2034     Glucose 251 mg/dL      Comment: Meter: [id number] : [id number] Jennie Gilliam CNA       POC Glucose Once [[id number]]  (Abnormal) Collected: 06/30/22 1615    Specimen: Blood Updated: 06/30/22 1616     Glucose 197 mg/dL      Comment: Meter: [id number] : [id number] Makharadze Firuza NA       POC Glucose Once [[id number]]  (Normal) Collected: 06/30/22 1149    Specimen: Blood Updated: 06/30/22 1201     Glucose 100 mg/dL      Comment: Meter: [id number] : [id number] Makharadze Firuza NA       POC Glucose Once [[id number]]  (Abnormal) Collected: 06/30/22 1115    Specimen: Blood Updated: 06/30/22 1117     Glucose 68 mg/dL      Comment: Meter: [id number] : [id number] Makharadze Firuza NA       POC Glucose Once [[id number]]  (Abnormal) Collected: 06/30/22 1100    Specimen: Blood Updated: 06/30/22 1102     Glucose 55 mg/dL      Comment: Meter: [id number] : [id number] Makharadze Firuza NA       POC Glucose Once [[id number]]  (Abnormal) Collected: 06/30/22 1045    Specimen: Blood Updated: 06/30/22 1045     Glucose 55 mg/dL      Comment: Meter: [id number] : [id number] Edson MCELROY RN       Basic Metabolic Panel [[id number]]  (Abnormal) Collected: 06/30/22 0912    Specimen: Blood Updated: 06/30/22 1041     Glucose 91 mg/dL      BUN 43 mg/dL      Creatinine 2.61 mg/dL      Sodium 134 mmol/L      Potassium 5.3 mmol/L      Chloride 100 mmol/L      CO2 23.0 mmol/L      Calcium 7.6 mg/dL      BUN/Creatinine Ratio 16.5     Anion Gap 11.0 mmol/L      eGFR 21.0 mL/min/1.73      Comment: National Kidney Foundation and American Society of Nephrology (ASN) Task Force recommended calculation based on the Chronic Kidney Disease Epidemiology Collaboration (CKD-EPI) equation refit without adjustment for race.       Narrative:      GFR Normal >60  Chronic Kidney Disease <60  Kidney Failure <15      POC Glucose Once [[id number]]  (Abnormal) Collected: 06/30/22 1034    Specimen: Blood Updated: 06/30/22  1040     Glucose 43 mg/dL      Comment: Result Not Confirmed Meter: [id number] : [id number] Edson MCELROY AMY       Hepatic Function Panel [[id number]]  (Abnormal) Collected: 06/30/22 0912    Specimen: Blood Updated: 06/30/22 1031     Total Protein 5.9 g/dL      Albumin 2.60 g/dL      ALT (SGPT) 15 U/L      AST (SGOT) 30 U/L      Alkaline Phosphatase 426 U/L      Total Bilirubin 0.4 mg/dL      Bilirubin, Direct 0.3 mg/dL      Bilirubin, Indirect 0.1 mg/dL     Phosphorus [[id number]]  (Normal) Collected: 06/30/22 0912    Specimen: Blood Updated: 06/30/22 1031     Phosphorus 4.0 mg/dL     CBC & Differential [[id number]]  (Abnormal) Collected: 06/30/22 0912    Specimen: Blood Updated: 06/30/22 1010    Narrative:      The following orders were created for panel order CBC & Differential.  Procedure                               Abnormality         Status                     ---------                               -----------         ------                     CBC Auto Differential[[id number]]        Abnormal            Final result                 Please view results for these tests on the individual orders.    CBC Auto Differential [[id number]]  (Abnormal) Collected: 06/30/22 0912    Specimen: Blood Updated: 06/30/22 1010     WBC 19.91 10*3/mm3      RBC 3.25 10*6/mm3      Hemoglobin 8.5 g/dL      Hematocrit 27.2 %      MCV 83.7 fL      MCH 26.2 pg      MCHC 31.3 g/dL      RDW 18.1 %      RDW-SD 53.9 fl      MPV 10.0 fL      Platelets 315 10*3/mm3      Neutrophil % 81.8 %      Lymphocyte % 11.2 %      Monocyte % 5.4 %      Eosinophil % 0.2 %      Basophil % 0.1 %      Immature Grans % 1.3 %      Neutrophils, Absolute 16.30 10*3/mm3      Lymphocytes, Absolute 2.23 10*3/mm3      Monocytes, Absolute 1.07 10*3/mm3      Eosinophils, Absolute 0.03 10*3/mm3      Basophils, Absolute 0.02 10*3/mm3      Immature Grans, Absolute 0.26 10*3/mm3      nRBC 0.1 /100 WBC     POC Glucose Once [[id number]]  (Normal) Collected: 06/30/22 0631     Specimen: Blood Updated: 06/30/22 0633     Glucose 117 mg/dL      Comment: Meter: [id number] : [id number] Smyzer Inge NA       POC Glucose Once [[id number]]  (Abnormal) Collected: 06/30/22 0310    Specimen: Blood Updated: 06/30/22 0311     Glucose 181 mg/dL      Comment: Meter: [id number] : [id number] Smyzer Inge NA       POC Glucose Once [[id number]]  (Abnormal) Collected: 06/29/22 2046    Specimen: Blood Updated: 06/29/22 2048     Glucose 219 mg/dL      Comment: Meter: [id number] : [id number] Smyzer Inge NA       POC Glucose Once [[id number]]  (Normal) Collected: 06/29/22 1901    Specimen: Blood Updated: 06/29/22 1902     Glucose 101 mg/dL      Comment: Meter: [id number] : [id number] Cricket Harley NA       POC Glucose Once [[id number]]  (Abnormal) Collected: 06/29/22 1842    Specimen: Blood Updated: 06/29/22 1844     Glucose 56 mg/dL      Comment: Meter: [id number] : [id number] Anup SAAVEDRA           Data Review:  Results from last 7 days   Lab Units 07/08/22  0905 07/07/22  1014 07/06/22  1135   SODIUM mmol/L 134* 132* 131*   POTASSIUM mmol/L 4.2 4.3 6.1*   CHLORIDE mmol/L 97* 96* 98   CO2 mmol/L 24.9 24.0 21.0*   BUN mg/dL 21* 27* 41*   CREATININE mg/dL 2.21* 2.68* 3.34*   GLUCOSE mg/dL 161* 132* 153*   CALCIUM mg/dL 7.7* 7.7* 7.7*     Results from last 7 days   Lab Units 07/08/22  0905 07/07/22  1014 07/06/22  1135   WBC 10*3/mm3 8.42 8.44 12.51*   HEMOGLOBIN g/dL 8.1* 7.8* 8.5*   HEMATOCRIT % 26.2* 25.3* 28.4*   PLATELETS 10*3/mm3 175 173 214             Lab Results   Lab Value Date/Time    TROPONINT 0.092 (C) 12/19/2021 1314    TROPONINT 0.117 (C) 11/30/2021 0153    TROPONINT 0.132 (C) 11/29/2021 2121         Results from last 7 days   Lab Units 07/08/22  0905 07/07/22  1014 07/06/22  1135   ALK PHOS U/L 627* 669* 634*   BILIRUBIN mg/dL 0.6 0.5 0.6   BILIRUBIN DIRECT mg/dL 0.4* 0.3 0.3   ALT (SGPT) U/L 24 23 26   AST (SGOT) U/L 42* 39* 42*             Glucose    Date/Time Value Ref Range Status   07/08/2022 1049 169 (H) 70 - 130 mg/dL Final     Comment:     Meter: [id number] : [id number] Cricket Harley NA   07/08/2022 0746 122 70 - 130 mg/dL Final     Comment:     Meter: [id number] : [id number] Cricket Harley NA   07/08/2022 0223 129 70 - 130 mg/dL Final     Comment:     Meter: [id number] : [id number] Jennie Gilliam CNA   07/07/2022 2058 128 70 - 130 mg/dL Final     Comment:     Meter: [id number] : [id number] Randolph Melissa NA   07/07/2022 1706 85 70 - 130 mg/dL Final     Comment:     Meter: [id number] : [id number] Cricket Harley NA   07/07/2022 1053 156 (H) 70 - 130 mg/dL Final     Comment:     Meter: [id number] : [id number] Cricket Harley NA   07/07/2022 0627 182 (H) 70 - 130 mg/dL Final     Comment:     Meter: [id number] : [id number] Willydaija Blanca NA   07/07/2022 0259 99 70 - 130 mg/dL Final     Comment:     Meter: [id number] : [id number] Otilia Sumneryana NA           Past Medical History:   Diagnosis Date   • Acute CVA (cerebrovascular accident) (HCC) 5/1/2022   • Acute on chronic diastolic CHF (congestive heart failure) (HCC)    • CAD (coronary artery disease) 12/20/2021   • Diabetes (HCC)    • Disease of thyroid gland    • GERD (gastroesophageal reflux disease)    • Hyperlipidemia 11/30/2018   • Hypertension    • Rheumatoid arthritis (HCC)        Assessment:  Active Hospital Problems    Diagnosis  POA   • 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       Plan:  Insulin same dose.  Continue same and monitor.  The trend is better.  Pain better.  Don't think she is sick enough to admit to hospital.  HD per renal.    Aris Isbell MD  7/8/2022  17:20 EDT

## 2022-07-08 NOTE — SIGNIFICANT NOTE
"   07/08/22 1009   OTHER   Discipline physical therapy assistant   Rehab Time/Intention   Session Not Performed patient/family declined treatment  (Pnt refused AM PT session. She had difficulty keeping her eyes open as she spoke, When asked what was wrong, she stated \"same old, same old\". Then stated that she did not feel well.Will try again later.)     "

## 2022-07-08 NOTE — PLAN OF CARE
Goal Outcome Evaluation:  Plan of Care Reviewed With: patient        Progress: no change   Patient is calm and cooperative. Alert and oriented x 4. Forgetful. Complaining of left groin and left leg pain. PRN pain medication given. Resting in bed this morning. She had no loose stool today. She has dialysis this afternoon. Will continue to monitor.

## 2022-07-08 NOTE — PROGRESS NOTES
CC: Debility    SUBJECTIVE:    Patient had some lethargy earlier today when seen by therapist.  Patient reports that to feeling tired.  Was alert when seen on rounds earlier today.  She did not participate with occupational therapy today.  Did not participate with physical therapy in the morning, did work with only bedside activities in the afternoon, no ambulation.  Had hemodialysis again today.  She complains of pain at the left thigh diffusely.  Does not complain of any pain in the abdomen or below the left knee and no pain in the left arm or the other 3 extremities.  Diarrhea is better today  Was evaluated by internal medicine service who did not feel that she needed transfer to the acute care wing of the hospital           OBJECTIVE:  Vitals:    07/08/22 1725   BP: (!) 184/80   Pulse: 57   Resp: 20   Temp: 97.8 °F (36.6 °C)   SpO2: 97%       GENERAL: NAD  MENTAL STATUS: Awake alert  HEENT:  NCAT  CARDIOVASCULAR: Sinus rhythm    CHEST:  hemodialysis access right chest  RESPIRATORY: Normal respirations.  Clear to auscultation without wheezes rales or rhonchi  ABDOMEN: Active bowel sounds, soft, nontender.     EXTREMITIES: Left thigh edema persists.  Edema at the left thigh appears slightly less but still prominent.  Continues with increased edema in the left lower leg as well as right lower leg, but also slightly less in the right lower extremity  Has diffuse tenderness to palpation about the left thigh.  No tenderness of the left calf     Left quadricep muscle biopsy site-dressed  No myoclonus.  NEUROLOGIC:    Motor testing not performed as on hemodialysis      Scheduled Meds:aspirin, 81 mg, Oral, Daily  b complex-vitamin c-folic acid, 1 tablet, Oral, Daily  carvedilol, 25 mg, Oral, BID With Meals  cloNIDine, 0.2 mg, Oral, Q12H  epoetin brooke/brooke-epbx, 10,000 Units, Intravenous, Once per day on Mon Wed Fri  gabapentin, 100 mg, Oral, Q24H  gabapentin, 300 mg, Oral, BID  hydrALAZINE, 100 mg, Oral, Q8H  insulin  glargine, 7 Units, Subcutaneous, QAM  insulin lispro, 0-7 Units, Subcutaneous, TID AC  levothyroxine, 150 mcg, Oral, Q AM  lidocaine, 1 patch, Transdermal, Q24H  losartan, 100 mg, Oral, Q24H  miconazole, 1 application, Topical, Q12H  pantoprazole, 40 mg, Oral, Q AM  rOPINIRole, 0.75 mg, Oral, Nightly  sertraline, 150 mg, Oral, Daily  sodium zirconium cyclosilicate, 5 g, Oral, Daily  tamsulosin, 0.4 mg, Oral, Daily      Continuous Infusions:   PRN Meds:.•  acetaminophen  •  dextrose  •  dextrose  •  diphenoxylate-atropine  •  glucagon (human recombinant)  •  heparin (porcine)  •  hydrALAZINE  •  nitroglycerin  •  oxyCODONE  •  oxyCODONE  •  sodium chloride  •  zolpidem    Glucose   Date/Time Value Ref Range Status   07/08/2022 1723 176 (H) 70 - 130 mg/dL Final     Comment:     Meter: [id number] : [id number] Jose Angel Rod    07/08/2022 1049 169 (H) 70 - 130 mg/dL Final     Comment:     Meter: [id number] : [id number] Cricket Peacock [facility]   07/08/2022 0746 122 70 - 130 mg/dL Final     Comment:     Meter: [id number] : [id number] Cricket Peacock [facility]   07/08/2022 0223 129 70 - 130 mg/dL Final     Comment:     Meter: [id number] : [id number] Jennie Gilliam CNA   07/07/2022 2058 128 70 - 130 mg/dL Final     Comment:     Meter: [id number] : [id number] Dickson Krisschema    07/07/2022 1706 85 70 - 130 mg/dL Final     Comment:     Meter: [id number] : [id number] Cricket Harley    07/07/2022 1053 156 (H) 70 - 130 mg/dL Final     Comment:     Meter: [id number] : [id number] Cricket Peacock [facility]   07/07/2022 0627 182 (H) 70 - 130 mg/dL Final     Comment:     Meter: [id number] : [id number] Abayeva Rianna NA     Results from last 7 days   Lab Units 07/08/22  0905 07/07/22  1014 07/06/22  1135   WBC 10*3/mm3 8.42 8.44 12.51*   HEMOGLOBIN g/dL 8.1* 7.8* 8.5*   HEMATOCRIT % 26.2* 25.3* 28.4*   PLATELETS 10*3/mm3 175 173 214     Results from last 7 days   Lab Units 07/08/22  0905  07/07/22  1014 07/06/22  1135   SODIUM mmol/L 134* 132* 131*   POTASSIUM mmol/L 4.2 4.3 6.1*   CHLORIDE mmol/L 97* 96* 98   CO2 mmol/L 24.9 24.0 21.0*   BUN mg/dL 21* 27* 41*   CREATININE mg/dL 2.21* 2.68* 3.34*   CALCIUM mg/dL 7.7* 7.7* 7.7*   BILIRUBIN mg/dL 0.6 0.5 0.6   ALK PHOS U/L 627* 669* 634*   ALT (SGPT) U/L 24 23 26   AST (SGOT) U/L 42* 39* 42*   GLUCOSE mg/dL 161* 132* 153*      Latest Reference Range & Units 05/27/22 11:30   Creatine Kinase 20 - 180 U/L 94   Aldolase 3.3 - 10.3 U/L 5.6       Imaging Results (Last 24 Hours)     ** No results found for the last 24 hours. **          ASSESSMENT AND PLAN    # R MCA s/p TPA with subsequent ICH with debility  Initially held antiplatelet/anticoagulation.  Reviewed with neurology on May 20 and resume aspirin 81 mg daily  SBP goal <160  Recommend outpatient Neurology fu - repeat MRI in 3 months        # DM  June 27-hyperglycemia on steroids.  Lantus increased to 30 units twice daily, Humalog 5 units 3 times daily with meals, and all increase sliding scale coverage  June 28 -hypoglycemic after increasing insulin dosing.  Blood glucose up to 98 prior to lunch, will hold sliding scale insulin and give 5 units scheduled with meal  June 30-hypoglycemia-Lantus twice daily decreased from 35 to 20 units.  Scheduled Humalog with meals discontinued  July 1-prednisone has been discontinued.  Reviewed with internal medicine and Lantus decreased to 10 units twice daily and on discharge home to resume her home regimen of Lantus 10 units daily.  She will check her sugar tonight at home and if elevated give Lantus 10 units tonight and then continue with the Lantus 10 units daily tomorrow.  Reviewed with patient and her  that her sugars may be elevated initially as she just completed prednisone.  They were instructed to call for any additional instructions on adjustment in regimen if it remains elevated at home this weekend  July 6-Lantus 7 units daily  July 7-blood sugar  range 848-255- yesterday,  this AM     # ESRD   Nephrology following  Inpatient HD    Flomax  Hyperkalemia  July 5-patient had hemodialysis on July 3 and July 4 for hyperkalemia.  Lokelma resumed by nephrology  July 7-had hemodialysis yesterday and has plans for hemodialysis again today  July 8-hemodialysis again today     Infectious disease-   # s/p catheter infection with MRSA  Vancomycin IV with stop date of May 26  May 24-vancomycin random equal 12.90-on vancomycin 500 mg IV on Ubplnvb-Cwoujpkh-Kcugfdzh  May 26-to complete course of vancomycin today  #Urinary tract infection-on Dana 3 urinalysis was negative for infection but noted to have leukocytosis increase and repeat urinalysis on June 6 shows findings consistent with urinary tract infection.  Placed on a course of cefdinir renal dose 3 mg daily for 7 days.  No costophrenic angle tenderness patient reviewed with infectious disease service.  Leukocytosis felt related to the bladder infection and not previous catheter infection.  June 8-urine culture results pending.  On cefdinir.  Culture with E. coli, sensitivity pending  June 9 - E coli sensitive to cephalosporins.  June 21 - continues with leukocytosis WBC  15K today. May be from inflammatory process. Steroids added yesterday.   June 22-urine described as milky characteristic, different from previous-recheck urinalysis with culture if indicated  June 23-Labs are still pending today.  Urinalysis also pending as she does not make much urine.  She had a temperature of 100.2 last evening, afebrile this morning.  June 27 - abnormal UA but urine culture from June 24 no growth  July 1-leukocytosis felt related to the noninfectious process in the left thigh as well as secondary to steroids  July 5-WBC trending down to 15.6 today  July 6-WBC trending down to 12 K     #left hydroureteronephrosis-Dana 3-CT scan shows left hydro ureter nephrosis.  She had some previous dilation of the ureter on comparison  the past studies but increased today.  Bladder noted to be markedly distended.  Will do in and out cath now and daily to decompress bladder.  Addendum-intermittent cath for 600 cc.  June 4-once daily intermittent cath 450 cc.  June 5-seen by urology-Nguyễn catheter placed with plans to recheck hydroureter on renal ultrasound in 3 to 5 days.  June 6-Nguyễn catheter placed yesterday by urology, with only 170 cc out so far.  Patient reports did not note any change in her left groin pain after the in and out cath with decompression of the bladder..  June 8-reviewed with urology service-request noncontrast CT scan stone protocol to evaluate for resolution of the left hydronephrosis with urology planning to see tomorrow to review the films and then make recommendations, urology would recommend leaving the Nguyễn catheter in for the time being.  June 9 - improved left hydroureter on CT , Nguyễn discontinued.   July 1-to follow-up with urology as an outpatient with follow-up CT planned in August.  She occasionally required intermittent straight cath but this was very infrequent.  Home health nursing to follow.  Continues on Flomax  July 7-intermittent straight cath 400 cc       #Anemia-  EPO.  June 6-hemoglobin 7.8  June 21- hemoglobin 8.3  June 27 - hemoglobin 8.7  June 28 - hemoglobin 8.8  July 6-hemoglobin 8.5     Lymphadenopathy-evaluated by hematology oncology while here.  No significant change from scans earlier this year.  She is to repeat a scan in 3 months and follow-up with hematology oncology     #Elevated alkaline phosphatase  June 6-elevated alkaline phosphatase 770, up from 289 one week ago, 140 one month ago. Similar elevation in March, Feb even higher at 1,330 ( intra-abdominal fluid collection, underwent CT-guided aspiration  Revealed blood and cultures did not reveal any growth and therefore antibiotics  discontinued.  Surgery also did evaluate and signed off.), and elevated during admission in January ( She  was seen by general surgery who recommended and performed laparoscopic cholecystectomy during that admission).   ALT 70, AST 87, Total bilirubin 0.8. Ca 8.3.  ? If liver source or bone or due to an inflammatory response.  June 7-GGT also elevated.  Seen by gastroenterology.  [place] biliary source.  Liver ultrasound ordered  June 8-liver ultrasound was unremarkable  June 21 - patient declined liver biopsy.   June 27-gastroenterology following peripherally-plans to follow-up as an outpatient and review option of liver biopsy again if alkaline phosphatase remains elevated  June 28-remains elevated at 503  June 30-alkaline phosphatase lower at 426  July 6-trend back up to 634  July 8-alkaline phosphatase unchanged 627     #Pain - neuropathy BL lower extremity/left thigh pain  Gabapentin/oxycodone.    June 7-patient with lethargy this morning.  May be due to urinary tract infection but with recent increase and gabapentin and oxycodone, will change gabapentin to 200 mg twice a day and decrease oxycodone from 5 back down to 2.5 mg p.o. every 4 hours as needed  June 8-better speed with her processing today  June 21 - pain med regimen recently adjusted with tramadol 25 mg q 4 hours prn, gabapentin 300 g bid, lidoderm patch, oxycodone 2.5 mg q 6 hours prn. Prednisone 60 mg daily added which may help with pain from inflammatory process. Reports pain better today and participated better in therapies.   June 27 - continue present regimen  July 1-home on oxycodone 5 mg p.o. every 4 hours.  Pain dispense #40, gabapentin 300 mg twice daily, Tylenol 500 mg every 6 hours as needed  July 5-increase gabapentin slightly 300-100-300 mg.  Watch for any myoclonic twitching  July 8-had some lethargy earlier today, patient feels that she is tolerating oxycodone and gabapentin.  Was alert when seen on rounds.        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  Seen by infectious disease service with no acute infectious process noted.  Evaluated by hematology regarding lymphadenopathy, lymph node felt too small to biopsy.  Patient was on a course of low-dose prednisone 10 mg for 3 days then 5 mg for 2 days and then another 10 mg dose with out any significant improvement in the swelling or pain.  She has a history of rheumatoid arthritis.  See CT of the left thigh and MRI of the left thigh and pelvis reports   With lumbar radiculoplexitis and diabetic amyotrophy, on review of the literature do not see edema that she presents with associated with the findings or MRI changes in the tendon and musculature with edema.  There is descriptions of MRI changes in the plexus and peripheral nerve.  In terms of treatment options for diabetic amyotrophy , there have not been definitive clinical trials.  Immunomodulation with steroids has been inconclusive as well as other immunomodulation therapy.  Reviewed with the patient  that  feel that she would benefit from seeing her rheumatologist as an outpatient to evaluate this further, as there does appear to be inflammatory cause given the lymphadenopathy and edema.  Rheumatologist does not come to the hospital.  CPK x2 has been normal.  Aldolase has been normal.  May need to look at biopsy of left thigh muscle to assess further.   June 8-patient reviewed with neurology yesterday who will assess and also provide input regarding whether muscle biopsy may be helpful.  June 9 - Discussed with Neurology and Rheumatology - plan is for EMG and then muscle biopsy.   Dana 15 - Left quadricep muscle biopsy was completed  6/15, results pending.  Labs: CKs have been normal, ANCA panel 6/11 was unremarkable  June 21 - started on full treatment dose of Prednisone 60 mg daily yesterday pending path results. Reports pain better so far today. Specimen sent to [facility]. Clinical impression presently focal inflammatory myositis pending final pathology results.   June 22-pain continues better today.  June 23-outside pathology report still pending  June 24 - Patient reports left thigh pain better over past couple days but still present. Does not have the soreness at medial thigh as before. Complains of pain at mid-thigh. No change in swelling. Does have discomfort with proximal movement in LLE. Walked 320 feet CTG SBA RW today.   Pain improved on steroid. Discontinued atorvastatin. Biopsy results returned from [facility] - see report -Type 2 fiber atrophy, advanced. Denervation/reinnervation. No evidence of inflammatory myopathy or vasculitis. [facility] lab pursuing a dystrophy panel and will report findings in an addendum.  Reviewed with Neurology - as shown symptomatic response, continue Prednisone 60 mg daily for about one more week, then taper off over month.   June 27 - continue present regimen  June 28-increased pain medication regimen to oxycodone 5 mg every 4 hours as needed for severe pain.   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 Treatment would be aspirin which she is already on. Discussed with Neurology and as been on Prednisone 60 mg for only 1.5 week, will stop and not do taper. Discussed with Internal Medicine who will adjust insulin.  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  Reviewed with the patient that treatment for this is aspirin which she is already on.  She may do walking activities but would avoid strenuous activities  with left quadricep strengthening.  She may strengthen the other 3 extremities as tolerated.  Reviewed may take 3 months to resolve.  July 7-continues with left thigh pain.  She is noted to have increased edema in both lower extremities but particularly the left thigh compared to recent.  Lokelma has been associated with fluid retention which may explain increase edema in part.  Continues on aspirin for suspected diabetic muscular infarct.  No update report yet on additional studies for muscle biopsy.  Initial report was June 24 with additional studies planned    # HTN   Coreg  Clonidine  Hydralazine  Losartan  Nephrology/internal medicine following     #Hypothyroidism  Levothyroxine  June 9 - recheck TSH- addendum Dana 10- On Nov 30, 2021 , on Dec 2, 2021 T4 = 0.90, on Dec 9, T4=1.68. On May 7, TSH = 134. Has been on Levothyroxine 125 mcg/day it appears since at least Dec 13, 2021. See Dr Templeton's discharge note from Dec 17,2021 which discusses thyroid labs/dosing at that time. On June 9, 2022 TSH = 54.4.   Check free T4. Will get evaluation from Internal Medicine regarding thyroid studies/levothyroxine dosing.  June 11 - levothyroxine increased to 150mcg daily-continue this dose and she will need follow up TSH in 4-6 weeks from June 11 as an outpatient  July 5-repeat TSH and T4 ordered for Monday, July 11     #CAD  ASA held in setting of ICH - restart aspirin 81 mg daily on May 20, 2022 per Neurology     #Depression   Sertraline  June 28-sertraline dose increased  July 5-reviewed with psychiatry service-continue present regimen    Insomnia-improved on Ambien 5 mg  July 6-fatigue during the day-decrease Ambien 2.5 mg nightly and assess response  July 7-tolerated Ambien 2.5    #GERD   PPI     #Restless leg syndrome  Requip     #DVT prophylaxis-SCDs ordered but patient refusing.  Per neurology note May 11-continue off anticoagulation/antiplatelet for now. Restarted ASA 81 mg on May 20.  May 16-reviewed with  patient and try venous foot compression devices  May 20-also not able to tolerate venous foot compression devices.  May 25-bilateral lower extremity venous duplex negative for DVT    Diarrhea-July 7-loose stool x5 over the last day.  Lokelma is not associated with diarrhea.  C. difficile was negative on July 5.  Was on cefdinir from June 7 to June 13 and cefazolin one-time dose on Dana 15.  - will recheck C. difficile        -  comprehensive inpatient rehabilitation program working with PT, OT, SLP for a minimum of three hours per day, five days per week. - will monitor for progress     TEAM CONF - MAY 17- BED SBA. TRANSFER MIN. 4 STAIRS MIN. GAIT 160 FEET CTG RW. SLOW TO PROCESS. BATH MIN. LBD MIN. UBD MIN. GROOMING SET UP. TOILETING MIN . COGNITION OVERALL MILD DEFICITS. VISUAL IMPAIRMENT IMPACTS SOME TESTING. ESRD-HD. ANTIBIOTICS - VANCOMYCIN 10.90 YESTERDAY.  ELOS - 1-2 WEEKS.      TEAM CONF - MAY 24 - ALWAYS SO PROCESSING AFTER EACH DIALYSIS SESSION. AT HOME WOULD GO BACK TO HOME AND SLEEP. BED MIN ASSIST. TRANSFERS MIN. GAIT 80   FEET RW CTG. 4 STAIRS CTG. TOILET TRASNFERS AND SHOWER TRANSFERS MIN CTG. BATH CTG. LBD CTG. UBD SET UP. GROOMING SET UP. TOILETING CTG.   LEFT GROIN - ADDUCTOR PAIN - There is mild joint space narrowing involving both hips symmetrically. There are also some minimal degenerative changes involving both hips. The overall appearance is similar to the study of 12/16/2021. MODERATE WORD RETRIEVAL DEFICITS. IMPAIRED VISION AFFECTS HOME MANAGEMENT TASKS. HYPOGLLYCEMIA AFTER SSI .DECREASED TO 0-7 UNITS.   ELOS - ONE WEEK.         TEAM CONF - MAY 31 - TRANSFERS CTG. GAIT 40- FEET CTG RW LIMITED BY THIGH PAIN. TOILET TRANSFERS CTG. BATH CTG. LBD CTG. UBD SET UP. TOILETING CTG.  GROOMING SET UP. COGNITION - MODERATE WORD RETRIEVAL DEFICITS, MODERATE IMPAIRED MEMORY IMPACTED BY FATIGUE, STILL SLOW PROCESSING.  BNE (Active)  Att'n. - Mildly Imp.  Exec. Fx. - Mildly Imp., slowed  processing speed  Rsng/Jgmnt - WNL  Arith - Mod Imp.  Visuospatial Skills - DNA, pt declined citing poor vision  Visual Mem. DNA  Verbal Mem. - WNL  Emot - Pt endorsed mild dysphoria and anxiousness secodnary to current inpatient  Stay.  CONTINENT BOWEL. OCCASIONAL VOID. ESRD-HD. ON INSULIN. SKIN INTACT. LIDODERM PATCH TO LEFT ADDUCTOR TENDON. COURSE OF STEROID FOR LEFT THIGH JEREMI. CONSULTED ORTHO FOR INPUT.  ELOS - POSSIBLY Thursday DEPENDING ON FURTHER EVALUATIONS.            TEAM CONF - JUNE 21 -  DECLINED THERAPY DUE TO PAIN.  AT MOST RECENT THERAPY WHEN PARTICIPATED, TRANSFERS MIN. GAIT 80 FEET MIN / CTG MIN ASSIST RW. TOILET TRANSFERS CTG. TOILETING CTG. MODERATE IMPAIRED VERBAL MEMORY. PAIN MANAGEMENT - MEDS ADJUSTED - OXYCODONE 2.5 MG Q 6 HOURS PRN, TRAMADOL 25 MG Q 4 HOURS. MUSCLE BIOPSY RESULTS PENDING. STARTING ON PREDNISONE 60 MG DAILY YESTERDAY. WILL NEED TO ADJUST INSULING, BLOOD GLUCOSE > 300 THIS AM. GASTROENTEROLOGY EVALUATING LIVER. PATIENT DECLINES LIVER BIOPSY.  HYPOTHYROID - levothyroxine increased to 150mcg daily-continue this dose and she will need follow up TSH in 4-6 weeks from June 11 as an outpatient  ELOS -   1.5 WEEKS     TEAM CONF - June 28 - BED MIN  SIT TO SUPINE, SBA SUPINE TO SIT. CAR TRANSFERS CTG. TRANSFERS MIN ASSIST. 4 STAIRS MIN. GAIT 240 FEET CTG SBA. TOILET TRANSFERS MIN. UBB SBA. LBB MIN WITH LLE. UBD SET UP. LBD MOD ASSIST LLE.   INSULIN ADJUSTED FOR ELEVATED BLOOD GLUCOSE ON STEROIDS. ON PREDNISONE 60 MG DAILY AND PLAN TO TAPER OVER NEXT MONTH . BIOPSY REPORT SENT TO OUTPATIENT RHEUMATOLOGIST YESTERDAY. ADDITIONAL STUDIES ON BIOPSY PENDING.   BLADDER SCAN 400 CC BUT ONLY 200 CC ON INTERMITTENT STRAIGHT CATH. LEFT QUAD MUSCLE BIOPSY SITE HEALING.  ESRD - HD.   ELOS - Thursday WITH HOME HEALTH PT, OT, AND NURSING FOR DIABETES AND MONITORING ON STEROIDS.   Addendum-was not able to do car transfer after hemodialysis on Friday, July 1 and continued with inpatient rehab  course.    Rehabilitation-July 7-Patient has not been able to participate in therapy for 3 hours a day.  Will ask internal medicine to assess for transfer to the acute care wing in the hospital  July 8-Was evaluated by internal medicine service who did not feel that she needed transfer to the acute care wing of the hospit    Adonis Florentino MD       During rounds, used appropriate personal protective equipment including mask and gloves.  Additional gown if indicated.  Mask used was standard procedure mask. Appropriate PPE was worn during the entire visit.  Hand hygiene was completed before and after.

## 2022-07-08 NOTE — SIGNIFICANT NOTE
07/08/22 1314   OTHER   Discipline occupational therapist   Rehab Time/Intention   Session Not Performed patient unavailable for treatment  (pt leaving for HD unavailable for treatment session)   Recommendation   OT - Next Appointment 07/09/22

## 2022-07-08 NOTE — SIGNIFICANT NOTE
07/08/22 0936   OTHER   Discipline occupational therapist   Rehab Time/Intention   Session Not Performed patient/family declined, not feeling well  (pt states that she doesn't feel well this morning, states that she feels like she has too many medical issues going on to be on rehab.)   Recommendation   OT - Next Appointment 07/08/22

## 2022-07-08 NOTE — PROGRESS NOTES
[facility] Progress Note      Patient Name: Zaina Martinez  : 1965  MRN: [id number]  Primary Care Physician:  Karson Oreilly MD  Date of admission: 2022    Subjective     Interval History:   Follow up ESRD  Seen and examined.  Had dialysis yesterday with no issues.  Feels better.  No shortness of air or chest pain.  Lower extremity edema is improving      Review of Systems:   As noted above    Objective     Vitals:   Temp:  [97.7 °F (36.5 °C)-99.1 °F (37.3 °C)] 98 °F (36.7 °C)  Heart Rate:  [56-62] 56  Resp:  [16] 16  BP: (133-167)/(66-78) 167/78    Intake/Output Summary (Last 24 hours) at 2022 0857  Last data filed at 2022 0547  Gross per 24 hour   Intake 240 ml   Output 0 ml   Net 240 ml       Physical Exam:   General: ALert NAD chronically ill  HEENT: AT/NC; periorbital edema noted  Neck: RIJ TDC  Lungs: clear to auscultation  Heart: RRR no s3 or rub. 2/6 systolic murmur  Abdomen: +bs, soft, not distended  Extremities: 1+ pitting edema bilateral extremities.  Neuro:  Moves all 4 ext.     Scheduled Meds:     aspirin, 81 mg, Oral, Daily  b complex-vitamin c-folic acid, 1 tablet, Oral, Daily  carvedilol, 25 mg, Oral, BID With Meals  cloNIDine, 0.2 mg, Oral, Q12H  epoetin brooke/brooke-epbx, 10,000 Units, Intravenous, Once per day on   gabapentin, 100 mg, Oral, Q24H  gabapentin, 300 mg, Oral, BID  hydrALAZINE, 100 mg, Oral, Q8H  insulin glargine, 7 Units, Subcutaneous, QAM  insulin lispro, 0-7 Units, Subcutaneous, TID AC  levothyroxine, 150 mcg, Oral, Q AM  lidocaine, 1 patch, Transdermal, Q24H  losartan, 100 mg, Oral, Q24H  miconazole, 1 application, Topical, Q12H  pantoprazole, 40 mg, Oral, Q AM  rOPINIRole, 0.75 mg, Oral, Nightly  sertraline, 150 mg, Oral, Daily  sodium zirconium cyclosilicate, 5 g, Oral, Daily  tamsulosin, 0.4 mg, Oral, Daily      IV Meds:        Results Reviewed:   I have personally reviewed the results from the time of this  admission to 7/8/2022 08:57 EDT     Results from last 7 days   Lab Units 07/07/22  1014 07/06/22  1135 07/05/22  0949   SODIUM mmol/L 132* 131* 133*   POTASSIUM mmol/L 4.3 6.1* 5.1   CHLORIDE mmol/L 96* 98 97*   CO2 mmol/L 24.0 21.0* 25.0   BUN mg/dL 27* 41* 28*   CREATININE mg/dL 2.68* 3.34* 2.45*   CALCIUM mg/dL 7.7* 7.7* 7.7*   BILIRUBIN mg/dL 0.5 0.6 0.5   ALK PHOS U/L 669* 634* 533*   ALT (SGPT) U/L 23 26 26   AST (SGOT) U/L 39* 42* 35*   GLUCOSE mg/dL 132* 153* 189*       Estimated Creatinine Clearance: 22.3 mL/min (A) (by C-G formula based on SCr of 2.68 mg/dL (H)).    Results from last 7 days   Lab Units 07/07/22  1014 07/06/22  1135 07/05/22  0949   PHOSPHORUS mg/dL 4.1 5.7* 4.4             Results from last 7 days   Lab Units 07/07/22  1014 07/06/22  1135 07/05/22  0949 07/04/22  0704 07/03/22  0927   WBC 10*3/mm3 8.44 12.51* 15.65* 17.66* 23.31*   HEMOGLOBIN g/dL 7.8* 8.5* 8.4* 8.0* 8.3*   PLATELETS 10*3/mm3 173 214 204 182 206             Assessment / Plan     ASSESSMENT:    1.  ESRD, secondary to diabetic and hypertensive glomerulosclerosis; usual HD schedule is MWF.  Her volume status is generous but improving slowly  2.  Intracerebral bleed and altered mental status  3.  Infected TDC, s/p removal 5/1. Replaced. Concluded vancomycin with last dose given 5/26  4.  DM2    5.  Coronary artery disease  6.  Acute on chronic diastolic dysfunction  7.  Anemia of CKD, on long-acting ANDRAE as an outpatient. Trying to minimize transfusions unless Hgb less than 7. Iron panel in favor iron deficiency anemia.  8.  Hyponatremia.  Improved on dialysis  9. Hyperkalemia    PLAN:    1. Plan for hemodialysis again today and then back on her schedule on Monday.  Continue Lokelma as well with persistent hyperkalemia  2. Surveillance labs.          Ruthann Palmer MD  07/08/22  08:57 EDT    [facility]  [phone]

## 2022-07-08 NOTE — NURSING NOTE
Dialysis complete, removed 2 liters of fluid, no complications noted, received 10,000 units of epogen with this treatment,  Vital signs are in epic.

## 2022-07-08 NOTE — THERAPY TREATMENT NOTE
Inpatient Rehabilitation - Physical Therapy Treatment Note       [facility]     Patient Name: Zaina Martinez  : 1965  MRN: [id number]    Today's Date: 2022                    Admit Date: 2022      Visit Dx:     ICD-10-CM ICD-9-CM   1. Generalized weakness  R53.1 780.79   2. 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 Other insomnia  G47.09 780.52       Patient Active Problem List   Diagnosis   • Renal insufficiency   • Hypertensive disorder   • Hypothyroidism   • Type 2 diabetes mellitus with kidney complication, with long-term current use of insulin ([facility])   • Rheumatoid arthritis ([facility])   • Angioedema   • Esophageal dysmotility   • Anemia   • Medically noncompliant   • Myocardial infarction due to demand ischemia ([facility])   • Enteritis   • PRES (posterior reversible encephalopathy syndrome)   • Urine retention   • Klebsiella infection   • Superficial thrombophlebitis   • Generalized weakness   • ESRD (end stage renal disease) ([facility])   • CAD (coronary artery disease)   • Abnormal urinalysis   • Chronic diastolic CHF (congestive heart failure) ([facility])   • Pyelonephritis   • Calculus of gallbladder with acute on chronic cholecystitis without obstruction   • Pleural effusion on right   • Anemia due to chronic kidney disease, on chronic dialysis ([facility])   • Abnormal findings on diagnostic imaging of other specified body structures   • Acute upper respiratory infection   • Agitation   • Alkaline phosphatase raised   • Casts present in urine   • Cellulitis of toe   • Hip pain   • Community acquired pneumonia   • Depressive disorder   • Diarrhea of presumed infectious origin   • Difficult or painful urination   • Disease due to severe acute respiratory syndrome coronavirus 2 (SARS-CoV-2)   • Dyspnea   • Encounter for follow-up examination after completed treatment for conditions other than malignant neoplasm   • H/O: hypothyroidism   • Hyperlipidemia   • Hypomagnesemia   • Intractable  vomiting with nausea   • Leukocytosis   • Luetscher's syndrome   • Need for influenza vaccination   • Restless legs   • Noncompliance with treatment   • Shoulder pain   • Urinary tract infectious disease   • Metabolic encephalopathy   • Abnormal findings on diagnostic imaging of abdomen   • Status post cholecystectomy   • Hyponatremia   • Leukocytosis   • Acute metabolic encephalopathy   • Encephalopathy, toxic   • Acute CVA (cerebrovascular accident) (HCC)   • Intracranial hemorrhage (HCC)   • Stroke (HCC)   • Abnormal urinalysis   • Diabetic muscle infarction (HCC)   • Other insomnia       Past Medical History:   Diagnosis Date   • Acute CVA (cerebrovascular accident) (HCC) 5/1/2022   • Acute on chronic diastolic CHF (congestive heart failure) (HCC)    • CAD (coronary artery disease) 12/20/2021   • Diabetes (HCC)    • Disease of thyroid gland    • GERD (gastroesophageal reflux disease)    • Hyperlipidemia 11/30/2018   • Hypertension    • Rheumatoid arthritis (HCC)        Past Surgical History:   Procedure Laterality Date   • CHOLECYSTECTOMY WITH INTRAOPERATIVE CHOLANGIOGRAM N/A 1/10/2022    Procedure: Laparoscopic cholecystectomy with intraoperative cholangiogram;  Surgeon: Ramana Raygoza MD;  Location: [facility];  Service: General;  Laterality: N/A;   • EYE SURGERY     • HYSTERECTOMY     • INSERTION HEMODIALYSIS CATHETER N/A 12/6/2021    Procedure: HEMODIALYSIS CATHETER INSERTION;  Surgeon: Keli Salazar MD;  Location: [facility] 18/19;  Service: Vascular;  Laterality: N/A;   • INSERTION HEMODIALYSIS CATHETER N/A 5/3/2022    Procedure: TUNNELED CATHETER PLACEMENT;  Surgeon: Keli Salazar MD;  Location: [facility];  Service: Vascular;  Laterality: N/A;   • MUSCLE BIOPSY Left 6/15/2022    Procedure: Left quadriceps muscle biopsy;  Surgeon: Marques Ma MD;  Location: [facility];  Service: Neurosurgery;  Laterality: Left;       PT ASSESSMENT (last 12 hours)     IRF PT  Evaluation and Treatment     Row Name 07/08/22 1230          PT Time and Intention    Document Type daily treatment  -LB     Mode of Treatment physical therapy  -LB     Patient/Family/Caregiver Comments/Observations Pnt in bed in [facility].  -LB     Row Name 07/08/22 1230          General Information    Existing Precautions/Restrictions fall;other (see comments)   contact precautions. No strenuous strengthening ex Left quads or hamstrings  -LB     Comment, General Information In AM refused PT 2ary to not feeling well, as well as pain L thigh. In PM agreed to bedside PT for ex only. Refused amb 2ary to L thigh pain.  -LB     Row Name 07/08/22 1230          Pain Assessment    Pretreatment Pain Rating 7/10  -LB     Pain Location - Side/Orientation Left  -LB     Pain Location - groin;other (see comments)  thigh  -LB     Pre/Posttreatment Pain Comment nurse notified  -LB     Row Name 07/08/22 1230          Hip (Therapeutic Exercise)    Hip AROM (Therapeutic Exercise) bilateral;aBduction;aDduction;supine;10 repetitions  slight assist given LLE  -LB     Hip Isometrics (Therapeutic Exercise) bilateral;gluteal sets;supine;10 repetitions;5 second hold  -LB     Row Name 07/08/22 1230          Knee (Therapeutic Exercise)    Knee AROM (Therapeutic Exercise) bilateral;SAQ (short arc quad);heel slides  slight assist given LLE  -LB     Knee Isometrics (Therapeutic Exercise) bilateral;quad sets;10 repetitions;5 second hold;supine  -LB     Row Name 07/08/22 1230          Ankle (Therapeutic Exercise)    Ankle AROM (Therapeutic Exercise) bilateral;dorsiflexion;plantarflexion;supine;10 repetitions  -LB     Row Name 07/08/22 1230          Positioning and Restraints    Post Treatment Position bed  -LB     In Bed call light within reach;exit alarm on;fowlers  -LB           User Key  (r) = Recorded By, (t) = Taken By, (c) = Cosigned By    Initials Name Provider Type    LB Liz Rodriguez, PTA Physical Therapist Assistant              Rash  Left lower thoracic spine (Active)   Distribution localized 07/08/22 0821   Color red;pink 07/08/22 0821   Configuration/Shape asymmetric 07/07/22 2042   Borders irregular 07/08/22 0821   Characteristics dry 07/08/22 0821   Care, Rash antimicrobial agent applied 07/08/22 0821       Wound 06/15/22 1312 Left anterior thigh Incision (Active)   Dressing Appearance open to air 07/08/22 0821   Closure Liquid skin adhesive 07/08/22 0821   Base dry;scab 07/08/22 0821   Periwound intact;dry 07/08/22 0821   Periwound Temperature warm 07/08/22 0821   Periwound Skin Turgor soft 07/08/22 0821   Drainage Amount none 07/08/22 0821   Dressing Care open to air 07/08/22 0821     Physical Therapy Education                 Title: PT OT SLP Therapies (In Progress)     Topic: Physical Therapy (In Progress)     Point: Mobility training (In Progress)     Learning Progress Summary           Patient Acceptance, E, NR by LB at 7/8/2022 1534   Significant Other Refuses, E, NR by  at 7/4/2022 1245    Comment:  did not show for teaching      Show all documentation for this point (36)                 Point: Home exercise program (In Progress)     Learning Progress Summary           Patient Acceptance, E, NR by LB at 7/8/2022 1534      Show all documentation for this point (12)                 Point: Body mechanics (Done)     Learning Progress Summary           Patient Acceptance, E,TB,D, VU,NR by  at 6/25/2022 1434      Show all documentation for this point (8)                 Point: Precautions (Done)     Learning Progress Summary           Patient Acceptance, E,TB,D, VU,NR by  at 6/25/2022 1434      Show all documentation for this point (8)                             User Key     Initials Effective Dates Name Provider Type Discipline    JS 06/16/21 -  Fany Lima, PT Physical Therapist PT    LB 03/07/18 -  Liz Rodriguez, PTA Physical Therapist Assistant PT    KP 06/16/21 -  Alyx Whiting, PT Physical Therapist PT                 PT Recommendation and Plan      Patient was wearing a face mask during this therapy encounter. Therapist used appropriate personal protective equipment including mask and gloves.  Mask used was standard procedure mask. Appropriate PPE was worn during the entire therapy session. Hand hygiene was completed before and after therapy session. Patient is not in enhanced droplet precautions.                        Time Calculation:      PT Charges     Row Name 07/08/22 1533             Time Calculation    Start Time 1230  -LB      Stop Time 1258  -LB      Time Calculation (min) 28 min  -LB            User Key  (r) = Recorded By, (t) = Taken By, (c) = Cosigned By    Initials Name Provider Type    Liz Henriquez PTA Physical Therapist Assistant                Therapy Charges for Today     Code Description Service Date Service Provider Modifiers Qty    11376171993 HC PT THER PROC EA 15 MIN 7/8/2022 Liz Rodriguez PTA GP 2            PT G-Codes  AM-PAC 6 Clicks Score (PT): 17      Liz Rodriguez PTA  7/8/2022

## 2022-07-08 NOTE — PLAN OF CARE
Goal Outcome Evaluation:    Slept well. Ambien given for sleep. Meds with water. On fluid restrictions. Had Dialysis Thurs. In Contact Isolation. Oxycodone given for Lt. Leg pain, with some relief. Glucose 128 @ 20:58 & 129 @ 02:23. Wears brief, for occasional incontinence.

## 2022-07-09 ENCOUNTER — APPOINTMENT (OUTPATIENT)
Dept: CARDIOLOGY | Facility: HOSPITAL | Age: 57
End: 2022-07-09

## 2022-07-09 LAB
ALBUMIN SERPL-MCNC: 2.9 G/DL (ref 3.5–5.2)
ALP SERPL-CCNC: 597 U/L (ref 39–117)
ALT SERPL W P-5'-P-CCNC: 21 U/L (ref 1–33)
ANION GAP SERPL CALCULATED.3IONS-SCNC: 10.4 MMOL/L (ref 5–15)
AST SERPL-CCNC: 35 U/L (ref 1–32)
BASOPHILS # BLD AUTO: 0.02 10*3/MM3 (ref 0–0.2)
BASOPHILS NFR BLD AUTO: 0.3 % (ref 0–1.5)
BH CV LOWER VASCULAR LEFT COMMON FEMORAL AUGMENT: NORMAL
BH CV LOWER VASCULAR LEFT COMMON FEMORAL COMPETENT: NORMAL
BH CV LOWER VASCULAR LEFT COMMON FEMORAL COMPRESS: NORMAL
BH CV LOWER VASCULAR LEFT COMMON FEMORAL PHASIC: NORMAL
BH CV LOWER VASCULAR LEFT COMMON FEMORAL SPONT: NORMAL
BH CV LOWER VASCULAR LEFT DISTAL FEMORAL COMPRESS: NORMAL
BH CV LOWER VASCULAR LEFT GASTRONEMIUS COMPRESS: NORMAL
BH CV LOWER VASCULAR LEFT GREATER SAPH AK COMPRESS: NORMAL
BH CV LOWER VASCULAR LEFT GREATER SAPH BK COMPRESS: NORMAL
BH CV LOWER VASCULAR LEFT LESSER SAPH COMPRESS: NORMAL
BH CV LOWER VASCULAR LEFT MID FEMORAL AUGMENT: NORMAL
BH CV LOWER VASCULAR LEFT MID FEMORAL COMPETENT: NORMAL
BH CV LOWER VASCULAR LEFT MID FEMORAL COMPRESS: NORMAL
BH CV LOWER VASCULAR LEFT MID FEMORAL PHASIC: NORMAL
BH CV LOWER VASCULAR LEFT MID FEMORAL SPONT: NORMAL
BH CV LOWER VASCULAR LEFT PERONEAL COMPRESS: NORMAL
BH CV LOWER VASCULAR LEFT POPLITEAL AUGMENT: NORMAL
BH CV LOWER VASCULAR LEFT POPLITEAL COMPETENT: NORMAL
BH CV LOWER VASCULAR LEFT POPLITEAL COMPRESS: NORMAL
BH CV LOWER VASCULAR LEFT POPLITEAL PHASIC: NORMAL
BH CV LOWER VASCULAR LEFT POPLITEAL SPONT: NORMAL
BH CV LOWER VASCULAR LEFT POSTERIOR TIBIAL COMPRESS: NORMAL
BH CV LOWER VASCULAR LEFT PROFUNDA FEMORAL COMPRESS: NORMAL
BH CV LOWER VASCULAR LEFT PROXIMAL FEMORAL COMPRESS: NORMAL
BH CV LOWER VASCULAR LEFT SAPHENOFEMORAL JUNCTION COMPRESS: NORMAL
BH CV LOWER VASCULAR RIGHT COMMON FEMORAL AUGMENT: NORMAL
BH CV LOWER VASCULAR RIGHT COMMON FEMORAL COMPETENT: NORMAL
BH CV LOWER VASCULAR RIGHT COMMON FEMORAL COMPRESS: NORMAL
BH CV LOWER VASCULAR RIGHT COMMON FEMORAL PHASIC: NORMAL
BH CV LOWER VASCULAR RIGHT COMMON FEMORAL SPONT: NORMAL
BILIRUB CONJ SERPL-MCNC: 0.3 MG/DL (ref 0–0.3)
BILIRUB INDIRECT SERPL-MCNC: 0.2 MG/DL
BILIRUB SERPL-MCNC: 0.5 MG/DL (ref 0–1.2)
BUN SERPL-MCNC: 22 MG/DL (ref 6–20)
BUN/CREAT SERPL: 10.1 (ref 7–25)
CALCIUM SPEC-SCNC: 7.6 MG/DL (ref 8.6–10.5)
CHLORIDE SERPL-SCNC: 96 MMOL/L (ref 98–107)
CO2 SERPL-SCNC: 24.6 MMOL/L (ref 22–29)
CREAT SERPL-MCNC: 2.17 MG/DL (ref 0.57–1)
DEPRECATED RDW RBC AUTO: 52.6 FL (ref 37–54)
EGFRCR SERPLBLD CKD-EPI 2021: 26.2 ML/MIN/1.73
EOSINOPHIL # BLD AUTO: 0.06 10*3/MM3 (ref 0–0.4)
EOSINOPHIL NFR BLD AUTO: 0.8 % (ref 0.3–6.2)
ERYTHROCYTE [DISTWIDTH] IN BLOOD BY AUTOMATED COUNT: 18 % (ref 12.3–15.4)
GLUCOSE BLDC GLUCOMTR-MCNC: 126 MG/DL (ref 70–130)
GLUCOSE BLDC GLUCOMTR-MCNC: 163 MG/DL (ref 70–130)
GLUCOSE BLDC GLUCOMTR-MCNC: 242 MG/DL (ref 70–130)
GLUCOSE BLDC GLUCOMTR-MCNC: 277 MG/DL (ref 70–130)
GLUCOSE SERPL-MCNC: 167 MG/DL (ref 65–99)
HCT VFR BLD AUTO: 25.8 % (ref 34–46.6)
HGB BLD-MCNC: 8.1 G/DL (ref 12–15.9)
IMM GRANULOCYTES # BLD AUTO: 0.04 10*3/MM3 (ref 0–0.05)
IMM GRANULOCYTES NFR BLD AUTO: 0.5 % (ref 0–0.5)
LYMPHOCYTES # BLD AUTO: 1.05 10*3/MM3 (ref 0.7–3.1)
LYMPHOCYTES NFR BLD AUTO: 13.4 % (ref 19.6–45.3)
MAXIMAL PREDICTED HEART RATE: 164 BPM
MCH RBC QN AUTO: 25.8 PG (ref 26.6–33)
MCHC RBC AUTO-ENTMCNC: 31.4 G/DL (ref 31.5–35.7)
MCV RBC AUTO: 82.2 FL (ref 79–97)
MONOCYTES # BLD AUTO: 0.7 10*3/MM3 (ref 0.1–0.9)
MONOCYTES NFR BLD AUTO: 9 % (ref 5–12)
NEUTROPHILS NFR BLD AUTO: 5.94 10*3/MM3 (ref 1.7–7)
NEUTROPHILS NFR BLD AUTO: 76 % (ref 42.7–76)
NRBC BLD AUTO-RTO: 0.1 /100 WBC (ref 0–0.2)
PHOSPHATE SERPL-MCNC: 3.1 MG/DL (ref 2.5–4.5)
PLATELET # BLD AUTO: 173 10*3/MM3 (ref 140–450)
PMV BLD AUTO: 10 FL (ref 6–12)
POTASSIUM SERPL-SCNC: 3.8 MMOL/L (ref 3.5–5.2)
PROT SERPL-MCNC: 5.7 G/DL (ref 6–8.5)
RBC # BLD AUTO: 3.14 10*6/MM3 (ref 3.77–5.28)
SODIUM SERPL-SCNC: 131 MMOL/L (ref 136–145)
STRESS TARGET HR: 139 BPM
WBC NRBC COR # BLD: 7.81 10*3/MM3 (ref 3.4–10.8)

## 2022-07-09 PROCEDURE — 80048 BASIC METABOLIC PNL TOTAL CA: CPT | Performed by: INTERNAL MEDICINE

## 2022-07-09 PROCEDURE — 97110 THERAPEUTIC EXERCISES: CPT

## 2022-07-09 PROCEDURE — 80076 HEPATIC FUNCTION PANEL: CPT | Performed by: PHYSICAL MEDICINE & REHABILITATION

## 2022-07-09 PROCEDURE — 82962 GLUCOSE BLOOD TEST: CPT

## 2022-07-09 PROCEDURE — 84100 ASSAY OF PHOSPHORUS: CPT | Performed by: INTERNAL MEDICINE

## 2022-07-09 PROCEDURE — 97535 SELF CARE MNGMENT TRAINING: CPT

## 2022-07-09 PROCEDURE — 85025 COMPLETE CBC W/AUTO DIFF WBC: CPT | Performed by: PHYSICAL MEDICINE & REHABILITATION

## 2022-07-09 PROCEDURE — 63710000001 INSULIN LISPRO (HUMAN) PER 5 UNITS: Performed by: HOSPITALIST

## 2022-07-09 PROCEDURE — 93971 EXTREMITY STUDY: CPT

## 2022-07-09 RX ADMIN — INSULIN GLARGINE-YFGN 7 UNITS: 100 INJECTION, SOLUTION SUBCUTANEOUS at 08:27

## 2022-07-09 RX ADMIN — TAMSULOSIN HYDROCHLORIDE 0.4 MG: 0.4 CAPSULE ORAL at 08:24

## 2022-07-09 RX ADMIN — GABAPENTIN 300 MG: 300 CAPSULE ORAL at 08:27

## 2022-07-09 RX ADMIN — HYDRALAZINE HYDROCHLORIDE 100 MG: 50 TABLET, FILM COATED ORAL at 05:39

## 2022-07-09 RX ADMIN — SERTRALINE 150 MG: 100 TABLET, FILM COATED ORAL at 08:24

## 2022-07-09 RX ADMIN — OXYCODONE 5 MG: 5 TABLET ORAL at 05:39

## 2022-07-09 RX ADMIN — CLONIDINE HYDROCHLORIDE 0.2 MG: 0.1 TABLET ORAL at 21:50

## 2022-07-09 RX ADMIN — CARVEDILOL 25 MG: 25 TABLET, FILM COATED ORAL at 17:02

## 2022-07-09 RX ADMIN — INSULIN LISPRO 4 UNITS: 100 INJECTION, SOLUTION INTRAVENOUS; SUBCUTANEOUS at 11:59

## 2022-07-09 RX ADMIN — SODIUM ZIRCONIUM CYCLOSILICATE 5 G: 5 POWDER, FOR SUSPENSION ORAL at 08:25

## 2022-07-09 RX ADMIN — HYDRALAZINE HYDROCHLORIDE 100 MG: 50 TABLET, FILM COATED ORAL at 21:50

## 2022-07-09 RX ADMIN — OXYCODONE 5 MG: 5 TABLET ORAL at 01:18

## 2022-07-09 RX ADMIN — ASPIRIN 81 MG: 81 TABLET, COATED ORAL at 08:25

## 2022-07-09 RX ADMIN — MICONAZOLE NITRATE 1 APPLICATION: 2 CREAM TOPICAL at 08:34

## 2022-07-09 RX ADMIN — INSULIN LISPRO 2 UNITS: 100 INJECTION, SOLUTION INTRAVENOUS; SUBCUTANEOUS at 08:26

## 2022-07-09 RX ADMIN — GABAPENTIN 300 MG: 300 CAPSULE ORAL at 21:44

## 2022-07-09 RX ADMIN — CLONIDINE HYDROCHLORIDE 0.2 MG: 0.1 TABLET ORAL at 08:24

## 2022-07-09 RX ADMIN — ZOLPIDEM TARTRATE 2.5 MG: 5 TABLET ORAL at 21:44

## 2022-07-09 RX ADMIN — ROPINIROLE HYDROCHLORIDE 0.75 MG: 0.5 TABLET, FILM COATED ORAL at 21:44

## 2022-07-09 RX ADMIN — LEVOTHYROXINE SODIUM 150 MCG: 0.15 TABLET ORAL at 05:40

## 2022-07-09 RX ADMIN — CARVEDILOL 25 MG: 25 TABLET, FILM COATED ORAL at 08:24

## 2022-07-09 RX ADMIN — INSULIN LISPRO 3 UNITS: 100 INJECTION, SOLUTION INTRAVENOUS; SUBCUTANEOUS at 17:04

## 2022-07-09 RX ADMIN — OXYCODONE 5 MG: 5 TABLET ORAL at 21:44

## 2022-07-09 RX ADMIN — OXYCODONE 5 MG: 5 TABLET ORAL at 10:46

## 2022-07-09 RX ADMIN — Medication 1 TABLET: at 08:24

## 2022-07-09 RX ADMIN — OXYCODONE 5 MG: 5 TABLET ORAL at 17:02

## 2022-07-09 RX ADMIN — LOSARTAN POTASSIUM 100 MG: 100 TABLET, FILM COATED ORAL at 08:25

## 2022-07-09 RX ADMIN — GABAPENTIN 100 MG: 100 CAPSULE ORAL at 14:17

## 2022-07-09 RX ADMIN — PANTOPRAZOLE SODIUM 40 MG: 40 TABLET, DELAYED RELEASE ORAL at 05:39

## 2022-07-09 RX ADMIN — HYDRALAZINE HYDROCHLORIDE 100 MG: 50 TABLET, FILM COATED ORAL at 14:17

## 2022-07-09 NOTE — THERAPY TREATMENT NOTE
Inpatient Rehabilitation - Physical Therapy Treatment Note       [facility]     Patient Name: Zaina Martinez  : 1965  MRN: [id number]    Today's Date: 2022                    Admit Date: 2022      Visit Dx:     ICD-10-CM ICD-9-CM   1. Generalized weakness  R53.1 780.79   2. 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 Other insomnia  G47.09 780.52       Patient Active Problem List   Diagnosis   • Renal insufficiency   • Hypertensive disorder   • Hypothyroidism   • Type 2 diabetes mellitus with kidney complication, with long-term current use of insulin ([facility])   • Rheumatoid arthritis ([facility])   • Angioedema   • Esophageal dysmotility   • Anemia   • Medically noncompliant   • Myocardial infarction due to demand ischemia ([facility])   • Enteritis   • PRES (posterior reversible encephalopathy syndrome)   • Urine retention   • Klebsiella infection   • Superficial thrombophlebitis   • Generalized weakness   • ESRD (end stage renal disease) ([facility])   • CAD (coronary artery disease)   • Abnormal urinalysis   • Chronic diastolic CHF (congestive heart failure) ([facility])   • Pyelonephritis   • Calculus of gallbladder with acute on chronic cholecystitis without obstruction   • Pleural effusion on right   • Anemia due to chronic kidney disease, on chronic dialysis ([facility])   • Abnormal findings on diagnostic imaging of other specified body structures   • Acute upper respiratory infection   • Agitation   • Alkaline phosphatase raised   • Casts present in urine   • Cellulitis of toe   • Hip pain   • Community acquired pneumonia   • Depressive disorder   • Diarrhea of presumed infectious origin   • Difficult or painful urination   • Disease due to severe acute respiratory syndrome coronavirus 2 (SARS-CoV-2)   • Dyspnea   • Encounter for follow-up examination after completed treatment for conditions other than malignant neoplasm   • H/O: hypothyroidism   • Hyperlipidemia   • Hypomagnesemia   • Intractable  vomiting with nausea   • Leukocytosis   • Luetscher's syndrome   • Need for influenza vaccination   • Restless legs   • Noncompliance with treatment   • Shoulder pain   • Urinary tract infectious disease   • Metabolic encephalopathy   • Abnormal findings on diagnostic imaging of abdomen   • Status post cholecystectomy   • Hyponatremia   • Leukocytosis   • Acute metabolic encephalopathy   • Encephalopathy, toxic   • Acute CVA (cerebrovascular accident) (HCC)   • Intracranial hemorrhage (HCC)   • Stroke (HCC)   • Abnormal urinalysis   • Diabetic muscle infarction (HCC)   • Other insomnia       Past Medical History:   Diagnosis Date   • Acute CVA (cerebrovascular accident) (HCC) 5/1/2022   • Acute on chronic diastolic CHF (congestive heart failure) (HCC)    • CAD (coronary artery disease) 12/20/2021   • Diabetes (HCC)    • Disease of thyroid gland    • GERD (gastroesophageal reflux disease)    • Hyperlipidemia 11/30/2018   • Hypertension    • Rheumatoid arthritis (HCC)        Past Surgical History:   Procedure Laterality Date   • CHOLECYSTECTOMY WITH INTRAOPERATIVE CHOLANGIOGRAM N/A 1/10/2022    Procedure: Laparoscopic cholecystectomy with intraoperative cholangiogram;  Surgeon: Ramana Raygoza MD;  Location: [facility];  Service: General;  Laterality: N/A;   • EYE SURGERY     • HYSTERECTOMY     • INSERTION HEMODIALYSIS CATHETER N/A 12/6/2021    Procedure: HEMODIALYSIS CATHETER INSERTION;  Surgeon: Keli Salazar MD;  Location: [facility] 18/19;  Service: Vascular;  Laterality: N/A;   • INSERTION HEMODIALYSIS CATHETER N/A 5/3/2022    Procedure: TUNNELED CATHETER PLACEMENT;  Surgeon: Keli Salazar MD;  Location: [facility];  Service: Vascular;  Laterality: N/A;   • MUSCLE BIOPSY Left 6/15/2022    Procedure: Left quadriceps muscle biopsy;  Surgeon: Marques Ma MD;  Location: [facility];  Service: Neurosurgery;  Laterality: Left;       PT ASSESSMENT (last 12 hours)     IRF PT  "Evaluation and Treatment     Row Name 07/09/22 1000          PT Time and Intention    Document Type daily treatment  -AE     Mode of Treatment physical therapy  -AE     Patient/Family/Caregiver Comments/Observations pt in bed, initially refused because she didn't \"feel like it\" stated moving aggravates it and the MD told her not to aggravate it, agreeable to bed therex  -AE     Row Name 07/09/22 1000          General Information    Patient Profile Reviewed yes  -AE     Row Name 07/09/22 1000          Pain Assessment    Pretreatment Pain Rating 9/10  -AE     Posttreatment Pain Rating 9/10  -AE     Pain Location - Side/Orientation Left  -AE     Pain Location upper  -AE     Pain Location - groin  -AE     Row Name 07/09/22 1000          Motor Skills    Therapeutic Exercise --  able to perform 5-6 reps on her own, minimal assist given last few reps  -AE     Row Name 07/09/22 1000          Elbow/Forearm (Therapeutic Exercise)    Elbow/Forearm AROM (Therapeutic Exercise) bilateral;flexion;extension;10 repetitions  -AE     Elbow/Forearm Strengthening (Therapeutic Exercise) --  forward reaching out to therapist's hands x 10, lateral reaching for rail x 3 reps  -AE     Row Name 07/09/22 1000          Hip (Therapeutic Exercise)    Hip Isometrics (Therapeutic Exercise) bilateral;gluteal sets;10 repetitions  -AE     Row Name 07/09/22 1000          Knee (Therapeutic Exercise)    Knee Isometrics (Therapeutic Exercise) bilateral;quad sets;10 repetitions  -AE     Knee Strengthening (Therapeutic Exercise) bilateral;heel slides;10 repetitions  -AE     Row Name 07/09/22 1000          Ankle (Therapeutic Exercise)    Ankle AROM (Therapeutic Exercise) bilateral;dorsiflexion;plantarflexion;10 repetitions  -AE     Row Name 07/09/22 1000          Positioning and Restraints    Pre-Treatment Position in bed  -AE     Post Treatment Position bed  -AE     In Bed supine;call light within reach;encouraged to call for assist;exit alarm on  -AE     "       User Key  (r) = Recorded By, (t) = Taken By, (c) = Cosigned By    Initials Name Provider Type    AE Keli Camarillo, PT Physical Therapist              Rash Left lower thoracic spine (Active)   Distribution localized 07/09/22 0800   Color pink;red 07/09/22 0800   Configuration/Shape asymmetric 07/09/22 0800   Borders irregular 07/09/22 0800   Characteristics dry 07/09/22 0800   Care, Rash antimicrobial agent applied 07/09/22 0800       Wound 06/15/22 1312 Left anterior thigh Incision (Active)   Dressing Appearance open to air 07/09/22 0800   Closure Liquid skin adhesive 07/09/22 0800   Base clean;dry 07/09/22 0800   Periwound intact;dry 07/09/22 0800   Drainage Amount none 07/08/22 2010   Dressing Care open to air 07/09/22 0800     Physical Therapy Education                 Title: PT OT SLP Therapies (In Progress)     Topic: Physical Therapy (In Progress)     Point: Mobility training (In Progress)     Learning Progress Summary           Patient Acceptance, E, NR by LB at 7/8/2022 1534   Significant Other Refuses, E, NR by KP at 7/4/2022 1245    Comment:  did not show for teaching      Show all documentation for this point (36)                 Point: Home exercise program (In Progress)     Learning Progress Summary           Patient Acceptance, E, NR by LB at 7/8/2022 1534      Show all documentation for this point (12)                 Point: Body mechanics (Done)     Learning Progress Summary           Patient Acceptance, E,TB,D, VU,NR by  at 6/25/2022 1434      Show all documentation for this point (8)                 Point: Precautions (Done)     Learning Progress Summary           Patient Acceptance, E,TB,D, VU,NR by  at 6/25/2022 1434      Show all documentation for this point (8)                             User Key     Initials Effective Dates Name Provider Type Discipline    JS 06/16/21 -  Fany Lima, PT Physical Therapist PT    LB 03/07/18 -  Liz Rodriguez PTA Physical Therapist  Assistant PT    KP 06/16/21 -  Alyx Whiting PT Physical Therapist PT                PT Recommendation and Plan                          Time Calculation:      PT Charges     Row Name 07/09/22 1159             Time Calculation    Start Time 0900  -AE      Stop Time 0930  -AE      Time Calculation (min) 30 min  -AE      PT Received On 07/09/22  -AE      PT - Next Appointment 07/11/22  -AE              Time Calculation- PT    Total Timed Code Minutes- PT 30 minute(s)  -AE            User Key  (r) = Recorded By, (t) = Taken By, (c) = Cosigned By    Initials Name Provider Type    AE Keli Camarillo, PT Physical Therapist                Therapy Charges for Today     Code Description Service Date Service Provider Modifiers Qty    57225526883 HC PT THER PROC EA 15 MIN 7/9/2022 Keli Camarillo, PT GP 2            PT G-Codes  AM-PAC 6 Clicks Score (PT): 17      Keli Camarillo PT  7/9/2022

## 2022-07-09 NOTE — PLAN OF CARE
Problem: Rehabilitation (IRF) Plan of Care  Goal: Plan of Care Review  Recent Flowsheet Documentation  Taken 7/9/2022 0334 by Jannie Carrilol, RN  Progress: improving  Plan of Care Reviewed With: patient  Outcome Evaluation: Pt is A&Ox4, calm and cooperative, meds whole w/ water, did c/o pain, medicated pt per request, pain still in groin of left leg, incision is scabbed and open to air, pt did have dialysis on 7/8, tolerated well, pt does have rash on upper coccyx lower back area, gave ambien at bedtime per pt request, resting well, will continue to monitor.   Goal Outcome Evaluation:  Plan of Care Reviewed With: patient        Progress: improving  Outcome Evaluation: Pt is A&Ox4, calm and cooperative, meds whole w/ water, did c/o pain, medicated pt per request, pain still in groin of left leg, incision is scabbed and open to air, pt did have dialysis on 7/8, tolerated well, pt does have rash on upper coccyx lower back area, gave ambien at bedtime per pt request, resting well, will continue to monitor.

## 2022-07-09 NOTE — PROGRESS NOTES
CC: Debility    SUBJECTIVE:    Seen and examined, no acute events overnight.  Denies chest pain, shortness of breath, f/c. Slept well. Reports feeling weak and rundown           OBJECTIVE:  Vitals:    07/09/22 1350   BP: 163/71   Pulse: 58   Resp: 12   Temp:    SpO2: 95%       GENERAL: NAD  MENTAL STATUS: Awake alert  HEENT:  NCAT  CARDIOVASCULAR: Sinus rhythm    CHEST:  hemodialysis access right chest  RESPIRATORY: Normal respirations.  Clear to auscultation without wheezes rales or rhonchi  ABDOMEN: Active bowel sounds, soft, nontender.     EXTREMITIES: Left thigh edema persists.  Edema at the left thigh appears slightly less but still prominent.  Continues with increased edema in the left lower leg as well as right lower leg, but also slightly less in the right lower extremity  Has diffuse tenderness to palpation about the left thigh.  No tenderness of the left calf     Left quadricep muscle biopsy site-dressed  No myoclonus.  NEUROLOGIC:    Motor testing not performed as on hemodialysis      Scheduled Meds:aspirin, 81 mg, Oral, Daily  b complex-vitamin c-folic acid, 1 tablet, Oral, Daily  carvedilol, 25 mg, Oral, BID With Meals  cloNIDine, 0.2 mg, Oral, Q12H  epoetin brooke/brooke-epbx, 10,000 Units, Intravenous, Once per day on Mon Wed Fri  gabapentin, 100 mg, Oral, Q24H  gabapentin, 300 mg, Oral, BID  hydrALAZINE, 100 mg, Oral, Q8H  insulin glargine, 7 Units, Subcutaneous, QAM  insulin lispro, 0-7 Units, Subcutaneous, TID AC  levothyroxine, 150 mcg, Oral, Q AM  lidocaine, 1 patch, Transdermal, Q24H  losartan, 100 mg, Oral, Q24H  miconazole, 1 application, Topical, Q12H  pantoprazole, 40 mg, Oral, Q AM  rOPINIRole, 0.75 mg, Oral, Nightly  sertraline, 150 mg, Oral, Daily  sodium zirconium cyclosilicate, 5 g, Oral, Daily  tamsulosin, 0.4 mg, Oral, Daily      Continuous Infusions:   PRN Meds:.•  acetaminophen  •  dextrose  •  dextrose  •  diphenoxylate-atropine  •  glucagon (human recombinant)  •  heparin (porcine)  •   hydrALAZINE  •  nitroglycerin  •  oxyCODONE  •  oxyCODONE  •  sodium chloride  •  zolpidem    Glucose   Date/Time Value Ref Range Status   07/09/2022 1134 277 (H) 70 - 130 mg/dL Final     Comment:     Meter: [id number] : [id number] Carlton Thompson NA   07/09/2022 0635 163 (H) 70 - 130 mg/dL Final     Comment:     Meter: [id number] : [id number] Jennie Gilliam A   07/08/2022 2052 201 (H) 70 - 130 mg/dL Final     Comment:     Meter: [id number] : [id number] Jennie Gilliam A   07/08/2022 1723 176 (H) 70 - 130 mg/dL Final     Comment:     Meter: [id number] : [id number] Jose Angel Rod NA   07/08/2022 1049 169 (H) 70 - 130 mg/dL Final     Comment:     Meter: [id number] : [id number] Cricket Harley NA   07/08/2022 0746 122 70 - 130 mg/dL Final     Comment:     Meter: [id number] : [id number] Foster Ayush Harley NA   07/08/2022 0223 129 70 - 130 mg/dL Final     Comment:     Meter: [id number] : [id number] Jennie Gilliam [facility]   07/07/2022 2058 128 70 - 130 mg/dL Final     Comment:     Meter: [id number] : [id number] Randolph SAAVEDRA     Results from last 7 days   Lab Units 07/09/22  0711 07/08/22  0905 07/07/22  1014   WBC 10*3/mm3 7.81 8.42 8.44   HEMOGLOBIN g/dL 8.1* 8.1* 7.8*   HEMATOCRIT % 25.8* 26.2* 25.3*   PLATELETS 10*3/mm3 173 175 173     Results from last 7 days   Lab Units 07/09/22  0710 07/08/22  0905 07/07/22  1014   SODIUM mmol/L 131* 134* 132*   POTASSIUM mmol/L 3.8 4.2 4.3   CHLORIDE mmol/L 96* 97* 96*   CO2 mmol/L 24.6 24.9 24.0   BUN mg/dL 22* 21* 27*   CREATININE mg/dL 2.17* 2.21* 2.68*   CALCIUM mg/dL 7.6* 7.7* 7.7*   BILIRUBIN mg/dL 0.5 0.6 0.5   ALK PHOS U/L 597* 627* 669*   ALT (SGPT) U/L 21 24 23   AST (SGOT) U/L 35* 42* 39*   GLUCOSE mg/dL 167* 161* 132*      Latest Reference Range & Units 05/27/22 11:30   Creatine Kinase 20 - 180 U/L 94   Aldolase 3.3 - 10.3 U/L 5.6       Imaging Results (Last 24 Hours)     ** No results found for the last 24 hours. **           ASSESSMENT AND PLAN    # R MCA s/p TPA with subsequent ICH with debility  Initially held antiplatelet/anticoagulation.  Reviewed with neurology on May 20 and resume aspirin 81 mg daily  SBP goal <160  Recommend outpatient Neurology fu - repeat MRI in 3 months        # DM  June 27-hyperglycemia on steroids.  Lantus increased to 30 units twice daily, Humalog 5 units 3 times daily with meals, and all increase sliding scale coverage  June 28 -hypoglycemic after increasing insulin dosing.  Blood glucose up to 98 prior to lunch, will hold sliding scale insulin and give 5 units scheduled with meal  June 30-hypoglycemia-Lantus twice daily decreased from 35 to 20 units.  Scheduled Humalog with meals discontinued  July 1-prednisone has been discontinued.  Reviewed with internal medicine and Lantus decreased to 10 units twice daily and on discharge home to resume her home regimen of Lantus 10 units daily.  She will check her sugar tonight at home and if elevated give Lantus 10 units tonight and then continue with the Lantus 10 units daily tomorrow.  Reviewed with patient and her  that her sugars may be elevated initially as she just completed prednisone.  They were instructed to call for any additional instructions on adjustment in regimen if it remains elevated at home this weekend  July 6-Lantus 7 units daily  July 7-blood sugar range 266-963- yesterday,  this AM     # ESRD   Nephrology following  Inpatient HD    Flomax  Hyperkalemia  July 5-patient had hemodialysis on July 3 and July 4 for hyperkalemia.  Lokelma resumed by nephrology  July 7-had hemodialysis yesterday and has plans for hemodialysis again today  July 8-hemodialysis again today     Infectious disease-   # s/p catheter infection with MRSA  Vancomycin IV with stop date of May 26  May 24-vancomycin random equal 12.90-on vancomycin 500 mg IV on Ukflsov-Wmmvofek-Spsmclig  May 26-to complete course of vancomycin today  #Urinary tract  infection-on Dana 3 urinalysis was negative for infection but noted to have leukocytosis increase and repeat urinalysis on June 6 shows findings consistent with urinary tract infection.  Placed on a course of cefdinir renal dose 3 mg daily for 7 days.  No costophrenic angle tenderness patient reviewed with infectious disease service.  Leukocytosis felt related to the bladder infection and not previous catheter infection.  June 8-urine culture results pending.  On cefdinir.  Culture with E. coli, sensitivity pending  June 9 - E coli sensitive to cephalosporins.  June 21 - continues with leukocytosis WBC  15K today. May be from inflammatory process. Steroids added yesterday.   June 22-urine described as milky characteristic, different from previous-recheck urinalysis with culture if indicated  June 23-Labs are still pending today.  Urinalysis also pending as she does not make much urine.  She had a temperature of 100.2 last evening, afebrile this morning.  June 27 - abnormal UA but urine culture from June 24 no growth  July 1-leukocytosis felt related to the noninfectious process in the left thigh as well as secondary to steroids  July 5-WBC trending down to 15.6 today  July 6-WBC trending down to 12 K     #left hydroureteronephrosis-Dana 3-CT scan shows left hydro ureter nephrosis.  She had some previous dilation of the ureter on comparison the past studies but increased today.  Bladder noted to be markedly distended.  Will do in and out cath now and daily to decompress bladder.  Addendum-intermittent cath for 600 cc.  June 4-once daily intermittent cath 450 cc.  June 5-seen by urology-Nguyễn catheter placed with plans to recheck hydroureter on renal ultrasound in 3 to 5 days.  June 6-Nguyễn catheter placed yesterday by urology, with only 170 cc out so far.  Patient reports did not note any change in her left groin pain after the in and out cath with decompression of the bladder..  June 8-reviewed with urology  service-request noncontrast CT scan stone protocol to evaluate for resolution of the left hydronephrosis with urology planning to see tomorrow to review the films and then make recommendations, urology would recommend leaving the Nguyễn catheter in for the time being.  June 9 - improved left hydroureter on CT , Nguyễn discontinued.   July 1-to follow-up with urology as an outpatient with follow-up CT planned in August.  She occasionally required intermittent straight cath but this was very infrequent.  Home health nursing to follow.  Continues on Flomax  July 7-intermittent straight cath 400 cc       #Anemia-  EPO.  June 6-hemoglobin 7.8  June 21- hemoglobin 8.3  June 27 - hemoglobin 8.7  June 28 - hemoglobin 8.8  July 6-hemoglobin 8.5     Lymphadenopathy-evaluated by hematology oncology while here.  No significant change from scans earlier this year.  She is to repeat a scan in 3 months and follow-up with hematology oncology     #Elevated alkaline phosphatase  June 6-elevated alkaline phosphatase 770, up from 289 one week ago, 140 one month ago. Similar elevation in March, Feb even higher at 1,330 ( intra-abdominal fluid collection, underwent CT-guided aspiration  Revealed blood and cultures did not reveal any growth and therefore antibiotics  discontinued.  Surgery also did evaluate and signed off.), and elevated during admission in January ( She was seen by general surgery who recommended and performed laparoscopic cholecystectomy during that admission).   ALT 70, AST 87, Total bilirubin 0.8. Ca 8.3.  ? If liver source or bone or due to an inflammatory response.  June 7-GGT also elevated.  Seen by gastroenterology.  Ackley biliary source.  Liver ultrasound ordered  June 8-liver ultrasound was unremarkable  June 21 - patient declined liver biopsy.   June 27-gastroenterology following peripherally-plans to follow-up as an outpatient and review option of liver biopsy again if alkaline phosphatase remains elevated  June  28-remains elevated at 503  June 30-alkaline phosphatase lower at 426  July 6-trend back up to 634  July 8-alkaline phosphatase unchanged 627     #Pain - neuropathy BL lower extremity/left thigh pain  Gabapentin/oxycodone.    June 7-patient with lethargy this morning.  May be due to urinary tract infection but with recent increase and gabapentin and oxycodone, will change gabapentin to 200 mg twice a day and decrease oxycodone from 5 back down to 2.5 mg p.o. every 4 hours as needed  June 8-better speed with her processing today  June 21 - pain med regimen recently adjusted with tramadol 25 mg q 4 hours prn, gabapentin 300 g bid, lidoderm patch, oxycodone 2.5 mg q 6 hours prn. Prednisone 60 mg daily added which may help with pain from inflammatory process. Reports pain better today and participated better in therapies.   June 27 - continue present regimen  July 1-home on oxycodone 5 mg p.o. every 4 hours.  Pain dispense #40, gabapentin 300 mg twice daily, Tylenol 500 mg every 6 hours as needed  July 5-increase gabapentin slightly 300-100-300 mg.  Watch for any myoclonic twitching  July 8-had some lethargy earlier today, patient feels that she is tolerating oxycodone and gabapentin.  Was alert when seen on rounds.        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  Seen by infectious disease service with no acute infectious process noted.  Evaluated by hematology regarding lymphadenopathy, lymph node felt too small to biopsy.  Patient was on a course of low-dose prednisone 10 mg for 3 days then 5 mg for 2  days and then another 10 mg dose with out any significant improvement in the swelling or pain.  She has a history of rheumatoid arthritis.  See CT of the left thigh and MRI of the left thigh and pelvis reports   With lumbar radiculoplexitis and diabetic amyotrophy, on review of the literature do not see edema that she presents with associated with the findings or MRI changes in the tendon and musculature with edema.  There is descriptions of MRI changes in the plexus and peripheral nerve.  In terms of treatment options for diabetic amyotrophy , there have not been definitive clinical trials.  Immunomodulation with steroids has been inconclusive as well as other immunomodulation therapy.  Reviewed with the patient  that  feel that she would benefit from seeing her rheumatologist as an outpatient to evaluate this further, as there does appear to be inflammatory cause given the lymphadenopathy and edema.  Rheumatologist does not come to the hospital.  CPK x2 has been normal.  Aldolase has been normal.  May need to look at biopsy of left thigh muscle to assess further.   June 8-patient reviewed with neurology yesterday who will assess and also provide input regarding whether muscle biopsy may be helpful.  June 9 - Discussed with Neurology and Rheumatology - plan is for EMG and then muscle biopsy.   Dana 15 - Left quadricep muscle biopsy was completed 6/15, results pending.  Labs: CKs have been normal, ANCA panel 6/11 was unremarkable  June 21 - started on full treatment dose of Prednisone 60 mg daily yesterday pending path results. Reports pain better so far today. Specimen sent to [facility]. Clinical impression presently focal inflammatory myositis pending final pathology results.   June 22-pain continues better today.  June 23-outside pathology report still pending  June 24 - Patient reports left thigh pain better over past couple days but still present. Does not have the soreness at medial thigh as  before. Complains of pain at mid-thigh. No change in swelling. Does have discomfort with proximal movement in LLE. Walked 320 feet CTG SBA RW today.   Pain improved on steroid. Discontinued atorvastatin. Biopsy results returned from [facility] - see report -Type 2 fiber atrophy, advanced. Denervation/reinnervation. No evidence of inflammatory myopathy or vasculitis. [facility] lab pursuing a dystrophy panel and will report findings in an addendum.  Reviewed with Neurology - as shown symptomatic response, continue Prednisone 60 mg daily for about one more week, then taper off over month.   June 27 - continue present regimen  June 28-increased pain medication regimen to oxycodone 5 mg every 4 hours as needed for severe pain.   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 Treatment would be aspirin which she is already on. Discussed with Neurology and as been on Prednisone 60 mg for only 1.5 week, will stop and not do taper. Discussed with Internal Medicine who will adjust insulin.  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  Reviewed with the patient that treatment for this is aspirin which she is already on.  She may do walking activities but would avoid strenuous activities with left quadricep strengthening.  She may strengthen the other 3 extremities as tolerated.  Reviewed may take 3 months to resolve.  July 7-continues with left thigh pain.  She is noted to have increased edema in both lower extremities but particularly the left thigh compared to recent.  Lokelma has been associated with fluid retention which may explain increase edema in part.  Continues on aspirin for suspected diabetic muscular infarct.  No update report yet on additional studies for muscle biopsy.  Initial report was June 24 with additional studies planned    #  HTN   Coreg  Clonidine  Hydralazine  Losartan  Nephrology/internal medicine following     #Hypothyroidism  Levothyroxine  June 9 - recheck TSH- addendum Dana 10- On Nov 30, 2021 , on Dec 2, 2021 T4 = 0.90, on Dec 9, T4=1.68. On May 7, TSH = 134. Has been on Levothyroxine 125 mcg/day it appears since at least Dec 13, 2021. See Dr Templeton's discharge note from Dec 17,2021 which discusses thyroid labs/dosing at that time. On June 9, 2022 TSH = 54.4.   Check free T4. Will get evaluation from Internal Medicine regarding thyroid studies/levothyroxine dosing.  June 11 - levothyroxine increased to 150mcg daily-continue this dose and she will need follow up TSH in 4-6 weeks from June 11 as an outpatient  July 5-repeat TSH and T4 ordered for Monday, July 11     #CAD  ASA held in setting of ICH - restart aspirin 81 mg daily on May 20, 2022 per Neurology     #Depression   Sertraline  June 28-sertraline dose increased  July 5-reviewed with psychiatry service-continue present regimen    Insomnia-improved on Ambien 5 mg  July 6-fatigue during the day-decrease Ambien 2.5 mg nightly and assess response  July 7-tolerated Ambien 2.5    #GERD   PPI     #Restless leg syndrome  Requip     #DVT prophylaxis-SCDs ordered but patient refusing.  Per neurology note May 11-continue off anticoagulation/antiplatelet for now. Restarted ASA 81 mg on May 20.  May 16-reviewed with patient and try venous foot compression devices  May 20-also not able to tolerate venous foot compression devices.  May 25-bilateral lower extremity venous duplex negative for DVT    Diarrhea-July 7-loose stool x5 over the last day.  Lokelma is not associated with diarrhea.  C. difficile was negative on July 5.  Was on cefdinir from June 7 to June 13 and cefazolin one-time dose on Dana 15.  - will recheck C. difficile        -  comprehensive inpatient rehabilitation program working with PT, OT, SLP for a minimum of three hours per day, five days per week. - will  monitor for progress     TEAM CONF - MAY 17- BED SBA. TRANSFER MIN. 4 STAIRS MIN. GAIT 160 FEET CTG RW. SLOW TO PROCESS. BATH MIN. LBD MIN. UBD MIN. GROOMING SET UP. TOILETING MIN . COGNITION OVERALL MILD DEFICITS. VISUAL IMPAIRMENT IMPACTS SOME TESTING. ESRD-HD. ANTIBIOTICS - VANCOMYCIN 10.90 YESTERDAY.  ELOS - 1-2 WEEKS.      TEAM CONF - MAY 24 - ALWAYS SO PROCESSING AFTER EACH DIALYSIS SESSION. AT HOME WOULD GO BACK TO HOME AND SLEEP. BED MIN ASSIST. TRANSFERS MIN. GAIT 80   FEET RW CTG. 4 STAIRS CTG. TOILET TRASNFERS AND SHOWER TRANSFERS MIN CTG. BATH CTG. LBD CTG. UBD SET UP. GROOMING SET UP. TOILETING CTG.   LEFT GROIN - ADDUCTOR PAIN - There is mild joint space narrowing involving both hips symmetrically. There are also some minimal degenerative changes involving both hips. The overall appearance is similar to the study of 12/16/2021. MODERATE WORD RETRIEVAL DEFICITS. IMPAIRED VISION AFFECTS HOME MANAGEMENT TASKS. HYPOGLLYCEMIA AFTER SSI .DECREASED TO 0-7 UNITS.   ELOS - ONE WEEK.         TEAM CONF - MAY 31 - TRANSFERS CTG. GAIT 40- FEET CTG RW LIMITED BY THIGH PAIN. TOILET TRANSFERS CTG. BATH CTG. LBD CTG. UBD SET UP. TOILETING CTG.  GROOMING SET UP. COGNITION - MODERATE WORD RETRIEVAL DEFICITS, MODERATE IMPAIRED MEMORY IMPACTED BY FATIGUE, STILL SLOW PROCESSING.  BNE (Active)  Att'n. - Mildly Imp.  Exec. Fx. - Mildly Imp., slowed processing speed  Rsng/Jgmnt - WNL  Arith - Mod Imp.  Visuospatial Skills - DNA, pt declined citing poor vision  Visual Mem. DNA  Verbal Mem. - WNL  Emot - Pt endorsed mild dysphoria and anxiousness secodnary to current inpatient  Stay.  CONTINENT BOWEL. OCCASIONAL VOID. ESRD-HD. ON INSULIN. SKIN INTACT. LIDODERM PATCH TO LEFT ADDUCTOR TENDON. COURSE OF STEROID FOR LEFT THIGH JEREMI. CONSULTED ORTHO FOR INPUT.  ELOS - POSSIBLY Thursday DEPENDING ON FURTHER EVALUATIONS.            TEAM CONF - JUNE 21 -  DECLINED THERAPY DUE TO PAIN.  AT MOST RECENT THERAPY WHEN  PARTICIPATED, TRANSFERS MIN. GAIT 80 FEET MIN / CTG MIN ASSIST RW. TOILET TRANSFERS CTG. TOILETING CTG. MODERATE IMPAIRED VERBAL MEMORY. PAIN MANAGEMENT - MEDS ADJUSTED - OXYCODONE 2.5 MG Q 6 HOURS PRN, TRAMADOL 25 MG Q 4 HOURS. MUSCLE BIOPSY RESULTS PENDING. STARTING ON PREDNISONE 60 MG DAILY YESTERDAY. WILL NEED TO ADJUST INSULING, BLOOD GLUCOSE > 300 THIS AM. GASTROENTEROLOGY EVALUATING LIVER. PATIENT DECLINES LIVER BIOPSY.  HYPOTHYROID - levothyroxine increased to 150mcg daily-continue this dose and she will need follow up TSH in 4-6 weeks from June 11 as an outpatient  ELOS -   1.5 WEEKS     TEAM CONF - June 28 - BED MIN  SIT TO SUPINE, SBA SUPINE TO SIT. CAR TRANSFERS CTG. TRANSFERS MIN ASSIST. 4 STAIRS MIN. GAIT 240 FEET CTG SBA. TOILET TRANSFERS MIN. UBB SBA. LBB MIN WITH LLE. UBD SET UP. LBD MOD ASSIST LLE.   INSULIN ADJUSTED FOR ELEVATED BLOOD GLUCOSE ON STEROIDS. ON PREDNISONE 60 MG DAILY AND PLAN TO TAPER OVER NEXT MONTH . BIOPSY REPORT SENT TO OUTPATIENT RHEUMATOLOGIST YESTERDAY. ADDITIONAL STUDIES ON BIOPSY PENDING.   BLADDER SCAN 400 CC BUT ONLY 200 CC ON INTERMITTENT STRAIGHT CATH. LEFT QUAD MUSCLE BIOPSY SITE HEALING.  ESRD - HD.   ELOS - Thursday WITH HOME HEALTH PT, OT, AND NURSING FOR DIABETES AND MONITORING ON STEROIDS.   Addendum-was not able to do car transfer after hemodialysis on Friday, July 1 and continued with inpatient rehab course.    Rehabilitation-July 7-Patient has not been able to participate in therapy for 3 hours a day.  Will ask internal medicine to assess for transfer to the acute care wing in the hospital  July 8-Was evaluated by internal medicine service who did not feel that she needed transfer to the acute care wing of the hospit    7/9  - Continue comprehensive inpatient rehabilitation program  - Continue close medical monitoring for functional progress, decline or additional intervenable factors to amplify functional recovery  - Labs reviewed, hgb stable  - Nephrology  following, fluid restriction to 1200 mLs  - IM following, checking venous duplex, MRI L femur, CT chest, abdomen, pelvis    Mat Gaston MD       During rounds, used appropriate personal protective equipment including mask and gloves.  Additional gown if indicated.  Mask used was standard procedure mask. Appropriate PPE was worn during the entire visit.  Hand hygiene was completed before and after.

## 2022-07-09 NOTE — PLAN OF CARE
Goal Outcome Evaluation:  Plan of Care Reviewed With: patient         Pt is alert and oriented with a flat affect. Generalized weakness. Has difficulty dorsi/planter flexion of the left foot. Complains of constant pain in the left groin region. Rash on upper coccyx lower back region. Refused to sit in the chair for meals.   1200ml fluid restriction. Encouraged with eating meals. No unsafe behavior seen.

## 2022-07-09 NOTE — THERAPY TREATMENT NOTE
Inpatient Rehabilitation - Occupational Therapy Treatment Note    [facility]     Patient Name: Zaina Martinez  : 1965  MRN: [id number]    Today's Date: 2022                 Admit Date: 2022         ICD-10-CM ICD-9-CM   1. Generalized weakness  R53.1 780.79   2. Diabetic muscle infarction (Formerly [facility])  E11.69 250.80    M62.20 728.89   3. Other insomnia  G47.09 780.52       Patient Active Problem List   Diagnosis   • Renal insufficiency   • Hypertensive disorder   • Hypothyroidism   • Type 2 diabetes mellitus with kidney complication, with long-term current use of insulin (Formerly [facility])   • Rheumatoid arthritis (Formerly [facility])   • Angioedema   • Esophageal dysmotility   • Anemia   • Medically noncompliant   • Myocardial infarction due to demand ischemia (Formerly [facility])   • Enteritis   • PRES (posterior reversible encephalopathy syndrome)   • Urine retention   • Klebsiella infection   • Superficial thrombophlebitis   • Generalized weakness   • ESRD (end stage renal disease) (Formerly [facility])   • CAD (coronary artery disease)   • Abnormal urinalysis   • Chronic diastolic CHF (congestive heart failure) (Formerly [facility])   • Pyelonephritis   • Calculus of gallbladder with acute on chronic cholecystitis without obstruction   • Pleural effusion on right   • Anemia due to chronic kidney disease, on chronic dialysis (Formerly [facility])   • Abnormal findings on diagnostic imaging of other specified body structures   • Acute upper respiratory infection   • Agitation   • Alkaline phosphatase raised   • Casts present in urine   • Cellulitis of toe   • Hip pain   • Community acquired pneumonia   • Depressive disorder   • Diarrhea of presumed infectious origin   • Difficult or painful urination   • Disease due to severe acute respiratory syndrome coronavirus 2 (SARS-CoV-2)   • Dyspnea   • Encounter for follow-up examination after completed treatment for conditions other than malignant neoplasm   • H/O: hypothyroidism   • Hyperlipidemia   • Hypomagnesemia   • Intractable vomiting with  nausea   • Leukocytosis   • Luetscher's syndrome   • Need for influenza vaccination   • Restless legs   • Noncompliance with treatment   • Shoulder pain   • Urinary tract infectious disease   • Metabolic encephalopathy   • Abnormal findings on diagnostic imaging of abdomen   • Status post cholecystectomy   • Hyponatremia   • Leukocytosis   • Acute metabolic encephalopathy   • Encephalopathy, toxic   • Acute CVA (cerebrovascular accident) (HCC)   • Intracranial hemorrhage (HCC)   • Stroke (HCC)   • Abnormal urinalysis   • Diabetic muscle infarction (HCC)   • Other insomnia       Past Medical History:   Diagnosis Date   • Acute CVA (cerebrovascular accident) (HCC) 5/1/2022   • Acute on chronic diastolic CHF (congestive heart failure) (HCC)    • CAD (coronary artery disease) 12/20/2021   • Diabetes (HCC)    • Disease of thyroid gland    • GERD (gastroesophageal reflux disease)    • Hyperlipidemia 11/30/2018   • Hypertension    • Rheumatoid arthritis (HCC)        Past Surgical History:   Procedure Laterality Date   • CHOLECYSTECTOMY WITH INTRAOPERATIVE CHOLANGIOGRAM N/A 1/10/2022    Procedure: Laparoscopic cholecystectomy with intraoperative cholangiogram;  Surgeon: Ramana Raygoza MD;  Location: [facility];  Service: General;  Laterality: N/A;   • EYE SURGERY     • HYSTERECTOMY     • INSERTION HEMODIALYSIS CATHETER N/A 12/6/2021    Procedure: HEMODIALYSIS CATHETER INSERTION;  Surgeon: Keli Salazar MD;  Location: [facility] 18/19;  Service: Vascular;  Laterality: N/A;   • INSERTION HEMODIALYSIS CATHETER N/A 5/3/2022    Procedure: TUNNELED CATHETER PLACEMENT;  Surgeon: Keli Salazar MD;  Location: [facility] OR;  Service: Vascular;  Laterality: N/A;   • MUSCLE BIOPSY Left 6/15/2022    Procedure: Left quadriceps muscle biopsy;  Surgeon: Marques Ma MD;  Location: [facility] OR;  Service: Neurosurgery;  Laterality: Left;             IRF OT ASSESSMENT FLOWSHEET (last 12 hours)     IRF OT  Evaluation and Treatment     [facility] Name 07/09/22 1500          OT Time and Intention    Document Type daily treatment  -JERZY     Mode of Treatment occupational therapy  -JERZY     Patient Effort fair  -JERZY     Symptoms Noted During/After Treatment none  -[facility] Name 07/09/22 1500          General Information    Patient Profile Reviewed yes  -JERZY     Patient/Family/Caregiver Comments/Observations Beginning of session: Supine with HOB elevated. End of session: Supine with HOB elevated.  -JERZY     Existing Precautions/Restrictions fall;other (see comments)  -[facility] Name 07/09/22 1500          Pain Assessment    Pretreatment Pain Rating 6/10  -JERZY     Posttreatment Pain Rating 6/10  -JERZY     Pain Location - Side/Orientation Left  -JERZY     Pain Location upper  -     Pain Location - groin  -[facility] Name 07/09/22 1500          Cognition/Psychosocial    Affect/Mental Status (Cognition) flat/blunted affect  -     Orientation Status (Cognition) oriented x 3  -JERZY     Follows Commands (Cognition) follows one-step commands  -     Personal Safety Interventions fall prevention program maintained  -[facility] Name 07/09/22 1500          Basic Activities of Daily Living (BADLs)    Basic Activities of Daily Living upper body dressing;bathing;grooming  -[facility] Name 07/09/22 1500          Bathing    [place]vin Level (Bathing) bathing skills;upper body;set up;standby assist  -JERZY     Position (Bathing) supine;long sitting  -     Set-up Assistance (Bathing) obtain supplies  -[facility] Name 07/09/22 1500          Upper Body Dressing    Calvin Level (Upper Body Dressing) upper body dressing skills;doff;don;pull over garment;minimum assist (75% or more patient effort);set up assistance  -JERZY     Position (Upper Body Dressing) long sitting  -     Set-up Assistance (Upper Body Dressing) obtain clothing  -[facility] Name 07/09/22 1500          Grooming    Calvin Level (Grooming) set up  -JERZY     Position (Grooming) long  sitting  -JERZY     Set-up Assistance (Grooming) obtain supplies  -JERZY     Row Name 07/09/22 1500          Bed Mobility    Scooting/Bridging [place] (Bed Mobility) contact guard  -JERZY     Row Name 07/09/22 1500          Balance    Balance Assessment sitting static balance  -JERZY     Static Sitting Balance independent  -JERZY     Row Name 07/09/22 1500          Positioning and Restraints    Pre-Treatment Position in bed  -JERZY     Post Treatment Position bed  -JERZY     In Bed supine;call light within reach;encouraged to call for assist;exit alarm on  -JERZY           User Key  (r) = Recorded By, (t) = Taken By, (c) = Cosigned By    Initials Name Effective Dates    Dana Borges, OT 06/16/21 -                  Occupational Therapy Education                 Title: PT OT SLP Therapies (In Progress)     Topic: Occupational Therapy (Done)     Point: ADL training (Done)     Description:   Instruct learner(s) on proper safety adaptation and remediation techniques during self care or transfers.   Instruct in proper use of assistive devices.              Learning Progress Summary           Patient Acceptance, E,TB,D, VU,NR by JS at 6/25/2022 1434      Show all documentation for this point (2)                 Point: Home exercise program (Done)     Description:   Instruct learner(s) on appropriate technique for monitoring, assisting and/or progressing therapeutic exercises/activities.              Learning Progress Summary           Patient Acceptance, E,D,H, VU by AF at 6/30/2022 1544    Comment: review of HEP with hand weights and theraband      Show all documentation for this point (2)                 Point: Precautions (Done)     Description:   Instruct learner(s) on prescribed precautions during self-care and functional transfers.              Learning Progress Summary           Patient Acceptance, E,TB,D, VU,NR by JS at 6/25/2022 1434      Show all documentation for this point (2)                 Point: Body mechanics  (Done)     Description:   Instruct learner(s) on proper positioning and spine alignment during self-care, functional mobility activities and/or exercises.              Learning Progress Summary           Patient Acceptance, E,TB,D, VU,NR by  at 6/25/2022 1434      Show all documentation for this point (2)                             User Key     Initials Effective Dates Name Provider Type Discipline     06/16/21 -  Fany Lima, PT Physical Therapist PT    AF 06/16/21 -  Tasha Helm, OTR Occupational Therapist OT                    OT Recommendation and Plan                         Time Calculation:      Time Calculation- OT     Row Name 07/09/22 1528             Time Calculation- OT    OT Start Time 1300  -JERZY      OT Stop Time 1330  -JERZY      OT Time Calculation (min) 30 min  -JERZY      Total Timed Code Minutes- OT 30 minute(s)  -JERZY              Timed Charges    69151 - OT Self Care/Mgmt Minutes 30  -JERZY              Total Minutes    Timed Charges Total Minutes 30  -JERZY       Total Minutes 30  -JERZY            User Key  (r) = Recorded By, (t) = Taken By, (c) = Cosigned By    Initials Name Provider Type    Dana Borges OT Occupational Therapist              Therapy Charges for Today     Code Description Service Date Service Provider Modifiers Qty    [id number] HC OT SELF CARE/MGMT/TRAIN EA 15 MIN 7/9/2022 Dana Green OT GO 2                   Dana Green OT  7/9/2022

## 2022-07-09 NOTE — PROGRESS NOTES
[facility] Progress Note      Patient Name: Zaina Martinez  : 1965  MRN: [id number]  Primary Care Physician:  Karson Oreilly MD  Date of admission: 2022    Subjective     Interval History:   Follow up ESRD  Had HD both yesterday (2 L removed) and the day before; feels less puffy  Breathing is comfortable on room air      Review of Systems:   As noted above    Objective     Vitals:   Temp:  [97.7 °F (36.5 °C)-98.8 °F (37.1 °C)] 98.8 °F (37.1 °C)  Heart Rate:  [54-61] 60  Resp:  [18-20] 18  BP: (141-184)/(67-80) 160/70    Intake/Output Summary (Last 24 hours) at 2022 1041  Last data filed at 2022 0837  Gross per 24 hour   Intake 440 ml   Output 2000 ml   Net -1560 ml       Physical Exam:   General: Awake, oriented, NAD, chronically ill  HEENT: AT/NC; periorbital edema noted  Neck: RIJ TDC  Lungs: Diminished in bases, not labored on room air  Heart: RRR no s3 or rub. 2/6 systolic murmur  Abdomen: +bs, soft, not distended  Extremities: 1-2+ pitting edema bilateral extremities.  Neuro:  Moves all 4 ext.     Scheduled Meds:     aspirin, 81 mg, Oral, Daily  b complex-vitamin c-folic acid, 1 tablet, Oral, Daily  carvedilol, 25 mg, Oral, BID With Meals  cloNIDine, 0.2 mg, Oral, Q12H  epoetin brooke/brooke-epbx, 10,000 Units, Intravenous, Once per day on   gabapentin, 100 mg, Oral, Q24H  gabapentin, 300 mg, Oral, BID  hydrALAZINE, 100 mg, Oral, Q8H  insulin glargine, 7 Units, Subcutaneous, QAM  insulin lispro, 0-7 Units, Subcutaneous, TID AC  levothyroxine, 150 mcg, Oral, Q AM  lidocaine, 1 patch, Transdermal, Q24H  losartan, 100 mg, Oral, Q24H  miconazole, 1 application, Topical, Q12H  pantoprazole, 40 mg, Oral, Q AM  rOPINIRole, 0.75 mg, Oral, Nightly  sertraline, 150 mg, Oral, Daily  sodium zirconium cyclosilicate, 5 g, Oral, Daily  tamsulosin, 0.4 mg, Oral, Daily      IV Meds:        Results Reviewed:   I have personally reviewed the results from the time  of this admission to 7/9/2022 10:41 EDT     Results from last 7 days   Lab Units 07/09/22  0710 07/08/22  0905 07/07/22  1014   SODIUM mmol/L 131* 134* 132*   POTASSIUM mmol/L 3.8 4.2 4.3   CHLORIDE mmol/L 96* 97* 96*   CO2 mmol/L 24.6 24.9 24.0   BUN mg/dL 22* 21* 27*   CREATININE mg/dL 2.17* 2.21* 2.68*   CALCIUM mg/dL 7.6* 7.7* 7.7*   BILIRUBIN mg/dL 0.5 0.6 0.5   ALK PHOS U/L 597* 627* 669*   ALT (SGPT) U/L 21 24 23   AST (SGOT) U/L 35* 42* 39*   GLUCOSE mg/dL 167* 161* 132*       Estimated Creatinine Clearance: 27.6 mL/min (A) (by C-G formula based on SCr of 2.17 mg/dL (H)).    Results from last 7 days   Lab Units 07/09/22  0710 07/08/22  0905 07/07/22  1014   PHOSPHORUS mg/dL 3.1 3.8 4.1             Results from last 7 days   Lab Units 07/09/22  0711 07/08/22  0905 07/07/22  1014 07/06/22  1135 07/05/22  0949   WBC 10*3/mm3 7.81 8.42 8.44 12.51* 15.65*   HEMOGLOBIN g/dL 8.1* 8.1* 7.8* 8.5* 8.4*   PLATELETS 10*3/mm3 173 175 173 214 204             Assessment / Plan     ASSESSMENT:  1.  ESRD, secondary to diabetic and hypertensive glomerulosclerosis; usual HD schedule is MW.  Her volume status is generous but improving slowly; last HD was 7/8.  Hypervolemic hyponatremia; stable potassium  2.  Intracerebral bleed and altered mental status, with steady improvement  3.  Infected TDC, s/p removal 5/1. Replaced. Concluded vancomycin with last dose given 5/26  4.  DM2    5.  Coronary artery disease  6.  Acute on chronic diastolic dysfunction  7.  Anemia of CKD, on long-acting ANDRAE as an outpatient. Trying to minimize transfusions unless Hgb less than 7. Iron panel in favor of iron deficiency anemia.      PLAN:  1.  HD MWF with additional fluid removal  2.  Tighten fluid restriction to 1200 mL/day      Alejo Lange MD  07/09/22  10:41 EDT    [facility]  [phone]

## 2022-07-09 NOTE — PROGRESS NOTES
"DAILY PROGRESS NOTE  [facility]    Patient Identification:  Name: Zaina Martinez  Age: 56 y.o.  Sex: female  :  1965  MRN: [id number]         Primary Care Physician: Karson Oreilly MD    Subjective:  Interval History:She complains of leg pain.  Better today. Getting dialysis again    Objective:    Scheduled Meds:aspirin, 81 mg, Oral, Daily  b complex-vitamin c-folic acid, 1 tablet, Oral, Daily  carvedilol, 25 mg, Oral, BID With Meals  cloNIDine, 0.2 mg, Oral, Q12H  epoetin brooke/brooke-epbx, 10,000 Units, Intravenous, Once per day on   gabapentin, 100 mg, Oral, Q24H  gabapentin, 300 mg, Oral, BID  hydrALAZINE, 100 mg, Oral, Q8H  insulin glargine, 7 Units, Subcutaneous, QAM  insulin lispro, 0-7 Units, Subcutaneous, TID AC  levothyroxine, 150 mcg, Oral, Q AM  lidocaine, 1 patch, Transdermal, Q24H  losartan, 100 mg, Oral, Q24H  miconazole, 1 application, Topical, Q12H  pantoprazole, 40 mg, Oral, Q AM  rOPINIRole, 0.75 mg, Oral, Nightly  sertraline, 150 mg, Oral, Daily  sodium zirconium cyclosilicate, 5 g, Oral, Daily  tamsulosin, 0.4 mg, Oral, Daily      Continuous Infusions:     Vital signs in last 24 hours:  Temp:  [97.7 °F (36.5 °C)-98.8 °F (37.1 °C)] 98.8 °F (37.1 °C)  Heart Rate:  [54-61] 60  Resp:  [18-20] 18  BP: (141-184)/(67-80) 160/70    Intake/Output:    Intake/Output Summary (Last 24 hours) at 2022 1029  Last data filed at 2022 0837  Gross per 24 hour   Intake 440 ml   Output 2000 ml   Net -1560 ml       Exam:  /70   Pulse 60   Temp 98.8 °F (37.1 °C) (Oral)   Resp 18   Ht 157.5 cm (62.01\")   Wt 75.5 kg (166 lb 7.2 oz)   SpO2 93%   BMI 30.44 kg/m²     General Appearance:    Alert, cooperative, no distress   Head:    Normocephalic, without obvious abnormality, atraumatic   Eyes:       Throat:   Lips, tongue, gums normal   Neck:   Supple, symmetrical, trachea midline, no JVD   Lungs:     Clear to auscultation bilaterally, respirations unlabored   Chest " Wall:    No tenderness or deformity    Heart:    Regular rate and rhythm, S1 and S2 normal, no murmur,no  Rub or gallop   Abdomen:     Soft, nontender, bowel sounds active, no masses, no organomegaly    Extremities:   Extremities normal, atraumatic, no cyanosis, improving edema in lower extremities   Pulses:      Skin:   Skin is warm and dry,  no rashes or palpable lesions   Neurologic:   no focal deficits noted      Lab Results (last 72 hours)     Procedure Component Value Units Date/Time    POC Glucose Once [[id number]]  (Normal) Collected: 07/02/22 1719    Specimen: Blood Updated: 07/02/22 1721     Glucose 102 mg/dL      Comment: Meter: [id number] : [id number] Smooth Miranda RN       POC Glucose Once [[id number]]  (Abnormal) Collected: 07/02/22 1631    Specimen: Blood Updated: 07/02/22 1635     Glucose 41 mg/dL      Comment: RN Notified R and V Meter: [id number] : [id number] Smooth Miranda RN       POC Glucose Once [[id number]]  (Normal) Collected: 07/02/22 1151    Specimen: Blood Updated: 07/02/22 1152     Glucose 97 mg/dL      Comment: Meter: [id number] : [id number] Randolph SAAVEDRA       POC Glucose Once [[id number]]  (Normal) Collected: 07/02/22 1024    Specimen: Blood Updated: 07/02/22 1025     Glucose 120 mg/dL      Comment: Meter: [id number] : [id number] Smooth Miranda RN       Basic Metabolic Panel [[id number]]  (Abnormal) Collected: 07/02/22 0828    Specimen: Blood Updated: 07/02/22 0933     Glucose 65 mg/dL      BUN 40 mg/dL      Creatinine 2.50 mg/dL      Sodium 135 mmol/L      Potassium 5.3 mmol/L      Chloride 98 mmol/L      CO2 24.0 mmol/L      Calcium 7.6 mg/dL      BUN/Creatinine Ratio 16.0     Anion Gap 13.0 mmol/L      eGFR 22.1 mL/min/1.73      Comment: National Kidney Foundation and American Society of Nephrology (ASN) Task Force recommended calculation based on the Chronic Kidney Disease Epidemiology Collaboration (CKD-EPI) equation refit without adjustment for race.       Narrative:       GFR Normal >60  Chronic Kidney Disease <60  Kidney Failure <15      Hepatic Function Panel [[id number]]  (Abnormal) Collected: 07/02/22 0828    Specimen: Blood Updated: 07/02/22 0931     Total Protein 5.5 g/dL      Albumin 2.80 g/dL      ALT (SGPT) 14 U/L      AST (SGOT) 28 U/L      Alkaline Phosphatase 380 U/L      Total Bilirubin 0.4 mg/dL      Bilirubin, Direct <0.2 mg/dL      Bilirubin, Indirect --     Comment: Unable to calculate       Phosphorus [[id number]]  (Abnormal) Collected: 07/02/22 0828    Specimen: Blood Updated: 07/02/22 0931     Phosphorus 4.6 mg/dL     CBC & Differential [[id number]]  (Abnormal) Collected: 07/02/22 0828    Specimen: Blood Updated: 07/02/22 0904    Narrative:      The following orders were created for panel order CBC & Differential.  Procedure                               Abnormality         Status                     ---------                               -----------         ------                     CBC Auto Differential[[id number]]        Abnormal            Final result                 Please view results for these tests on the individual orders.    CBC Auto Differential [[id number]]  (Abnormal) Collected: 07/02/22 0828    Specimen: Blood Updated: 07/02/22 0904     WBC 21.35 10*3/mm3      RBC 3.21 10*6/mm3      Hemoglobin 8.4 g/dL      Hematocrit 26.8 %      MCV 83.5 fL      MCH 26.2 pg      MCHC 31.3 g/dL      RDW 18.9 %      RDW-SD 56.6 fl      MPV 10.0 fL      Platelets 234 10*3/mm3      Neutrophil % 81.5 %      Lymphocyte % 10.6 %      Monocyte % 6.7 %      Eosinophil % 0.1 %      Basophil % 0.1 %      Immature Grans % 1.0 %      Neutrophils, Absolute 17.40 10*3/mm3      Lymphocytes, Absolute 2.27 10*3/mm3      Monocytes, Absolute 1.42 10*3/mm3      Eosinophils, Absolute 0.02 10*3/mm3      Basophils, Absolute 0.02 10*3/mm3      Immature Grans, Absolute 0.22 10*3/mm3      nRBC 0.0 /100 WBC     POC Glucose Once [[id number]]  (Normal) Collected: 07/02/22 0614    Specimen:  Blood Updated: 07/02/22 0615     Glucose 92 mg/dL      Comment: Meter: [id number] : [id number] Envivio NA       POC Glucose Once [[id number]]  (Abnormal) Collected: 07/02/22 0253    Specimen: Blood Updated: 07/02/22 0254     Glucose 140 mg/dL      Comment: Meter: [id number] : [id number] Kushal Choe RN       POC Glucose Once [[id number]]  (Abnormal) Collected: 07/01/22 2036    Specimen: Blood Updated: 07/01/22 2037     Glucose 139 mg/dL      Comment: Meter: [id number] : [id number] Envivio NA       Hepatic Function Panel [[id number]]  (Abnormal) Collected: 07/01/22 1827    Specimen: Blood Updated: 07/01/22 1926     Total Protein 6.0 g/dL      Albumin 3.20 g/dL      ALT (SGPT) 18 U/L      AST (SGOT) 33 U/L      Alkaline Phosphatase 450 U/L      Total Bilirubin 0.5 mg/dL      Bilirubin, Direct 0.2 mg/dL      Bilirubin, Indirect 0.3 mg/dL     Basic Metabolic Panel [[id number]]  (Abnormal) Collected: 07/01/22 1827    Specimen: Blood Updated: 07/01/22 1926     Glucose 75 mg/dL      BUN 34 mg/dL      Creatinine 2.21 mg/dL      Sodium 134 mmol/L      Potassium 5.4 mmol/L      Chloride 97 mmol/L      CO2 24.0 mmol/L      Calcium 7.9 mg/dL      BUN/Creatinine Ratio 15.4     Anion Gap 13.0 mmol/L      eGFR 25.6 mL/min/1.73      Comment: National Kidney Foundation and American Society of Nephrology (ASN) Task Force recommended calculation based on the Chronic Kidney Disease Epidemiology Collaboration (CKD-EPI) equation refit without adjustment for race.       Narrative:      GFR Normal >60  Chronic Kidney Disease <60  Kidney Failure <15      Phosphorus [[id number]]  (Normal) Collected: 07/01/22 1827    Specimen: Blood Updated: 07/01/22 1926     Phosphorus 3.2 mg/dL     CBC & Differential [[id number]]  (Abnormal) Collected: 07/01/22 1827    Specimen: Blood Updated: 07/01/22 1909    Narrative:      The following orders were created for panel order CBC & Differential.  Procedure                                Abnormality         Status                     ---------                               -----------         ------                     CBC Auto Differential[[id number]]        Abnormal            Final result                 Please view results for these tests on the individual orders.    CBC Auto Differential [[id number]]  (Abnormal) Collected: 07/01/22 1827    Specimen: Blood Updated: 07/01/22 1909     WBC 24.21 10*3/mm3      RBC 3.60 10*6/mm3      Hemoglobin 9.4 g/dL      Hematocrit 30.0 %      MCV 83.3 fL      MCH 26.1 pg      MCHC 31.3 g/dL      RDW 19.0 %      RDW-SD 56.4 fl      MPV 10.3 fL      Platelets 288 10*3/mm3      Neutrophil % 94.6 %      Lymphocyte % 2.7 %      Monocyte % 1.4 %      Eosinophil % 0.0 %      Basophil % 0.1 %      Immature Grans % 1.2 %      Neutrophils, Absolute 22.89 10*3/mm3      Lymphocytes, Absolute 0.65 10*3/mm3      Monocytes, Absolute 0.34 10*3/mm3      Eosinophils, Absolute 0.00 10*3/mm3      Basophils, Absolute 0.03 10*3/mm3      Immature Grans, Absolute 0.30 10*3/mm3      nRBC 0.0 /100 WBC     POC Glucose Once [[id number]]  (Abnormal) Collected: 07/01/22 1606    Specimen: Blood Updated: 07/01/22 1607     Glucose 287 mg/dL      Comment: Meter: [id number] : [id number] Dierken Jessica L RN       POC Glucose Once [[id number]]  (Normal) Collected: 07/01/22 1246    Specimen: Blood Updated: 07/01/22 1247     Glucose 126 mg/dL      Comment: Meter: [id number] : [id number] Dierken Jessica L RN       POC Glucose Once [[id number]]  (Normal) Collected: 07/01/22 0639    Specimen: Blood Updated: 07/01/22 0641     Glucose 95 mg/dL      Comment: Meter: [id number] : [id number] [employer] CNA       POC Glucose Once [[id number]]  (Abnormal) Collected: 07/01/22 0208    Specimen: Blood Updated: 07/01/22 0210     Glucose 186 mg/dL      Comment: Meter: [id number] : [id number] Schneider Daliyan CNA       POC Glucose Once [[id number]]  (Abnormal) Collected: 06/30/22 2032    Specimen: Blood  Updated: 06/30/22 2034     Glucose 251 mg/dL      Comment: Meter: [id number] : [id number] Jennie Gilliam CNA       POC Glucose Once [[id number]]  (Abnormal) Collected: 06/30/22 1615    Specimen: Blood Updated: 06/30/22 1616     Glucose 197 mg/dL      Comment: Meter: [id number] : [id number] Makharadze Firuza NA       POC Glucose Once [[id number]]  (Normal) Collected: 06/30/22 1149    Specimen: Blood Updated: 06/30/22 1201     Glucose 100 mg/dL      Comment: Meter: [id number] : [id number] Makharadze Firuza NA       POC Glucose Once [[id number]]  (Abnormal) Collected: 06/30/22 1115    Specimen: Blood Updated: 06/30/22 1117     Glucose 68 mg/dL      Comment: Meter: [id number] : [id number] Makharadze Firuza NA       POC Glucose Once [[id number]]  (Abnormal) Collected: 06/30/22 1100    Specimen: Blood Updated: 06/30/22 1102     Glucose 55 mg/dL      Comment: Meter: [id number] : [id number] Makharadze Firuza NA       POC Glucose Once [[id number]]  (Abnormal) Collected: 06/30/22 1045    Specimen: Blood Updated: 06/30/22 1045     Glucose 55 mg/dL      Comment: Meter: [id number] : [id number] Edson MCELROY RN       Basic Metabolic Panel [[id number]]  (Abnormal) Collected: 06/30/22 0912    Specimen: Blood Updated: 06/30/22 1041     Glucose 91 mg/dL      BUN 43 mg/dL      Creatinine 2.61 mg/dL      Sodium 134 mmol/L      Potassium 5.3 mmol/L      Chloride 100 mmol/L      CO2 23.0 mmol/L      Calcium 7.6 mg/dL      BUN/Creatinine Ratio 16.5     Anion Gap 11.0 mmol/L      eGFR 21.0 mL/min/1.73      Comment: National Kidney Foundation and American Society of Nephrology (ASN) Task Force recommended calculation based on the Chronic Kidney Disease Epidemiology Collaboration (CKD-EPI) equation refit without adjustment for race.       Narrative:      GFR Normal >60  Chronic Kidney Disease <60  Kidney Failure <15      POC Glucose Once [[id number]]  (Abnormal) Collected: 06/30/22 1034    Specimen: Blood Updated: 06/30/22  1040     Glucose 43 mg/dL      Comment: Result Not Confirmed Meter: [id number] : [id number] Edson MCELROY AMY       Hepatic Function Panel [[id number]]  (Abnormal) Collected: 06/30/22 0912    Specimen: Blood Updated: 06/30/22 1031     Total Protein 5.9 g/dL      Albumin 2.60 g/dL      ALT (SGPT) 15 U/L      AST (SGOT) 30 U/L      Alkaline Phosphatase 426 U/L      Total Bilirubin 0.4 mg/dL      Bilirubin, Direct 0.3 mg/dL      Bilirubin, Indirect 0.1 mg/dL     Phosphorus [[id number]]  (Normal) Collected: 06/30/22 0912    Specimen: Blood Updated: 06/30/22 1031     Phosphorus 4.0 mg/dL     CBC & Differential [[id number]]  (Abnormal) Collected: 06/30/22 0912    Specimen: Blood Updated: 06/30/22 1010    Narrative:      The following orders were created for panel order CBC & Differential.  Procedure                               Abnormality         Status                     ---------                               -----------         ------                     CBC Auto Differential[[id number]]        Abnormal            Final result                 Please view results for these tests on the individual orders.    CBC Auto Differential [[id number]]  (Abnormal) Collected: 06/30/22 0912    Specimen: Blood Updated: 06/30/22 1010     WBC 19.91 10*3/mm3      RBC 3.25 10*6/mm3      Hemoglobin 8.5 g/dL      Hematocrit 27.2 %      MCV 83.7 fL      MCH 26.2 pg      MCHC 31.3 g/dL      RDW 18.1 %      RDW-SD 53.9 fl      MPV 10.0 fL      Platelets 315 10*3/mm3      Neutrophil % 81.8 %      Lymphocyte % 11.2 %      Monocyte % 5.4 %      Eosinophil % 0.2 %      Basophil % 0.1 %      Immature Grans % 1.3 %      Neutrophils, Absolute 16.30 10*3/mm3      Lymphocytes, Absolute 2.23 10*3/mm3      Monocytes, Absolute 1.07 10*3/mm3      Eosinophils, Absolute 0.03 10*3/mm3      Basophils, Absolute 0.02 10*3/mm3      Immature Grans, Absolute 0.26 10*3/mm3      nRBC 0.1 /100 WBC     POC Glucose Once [[id number]]  (Normal) Collected: 06/30/22 0631     Specimen: Blood Updated: 06/30/22 0633     Glucose 117 mg/dL      Comment: Meter: [id number] : [id number] Smyzer Inge NA       POC Glucose Once [[id number]]  (Abnormal) Collected: 06/30/22 0310    Specimen: Blood Updated: 06/30/22 0311     Glucose 181 mg/dL      Comment: Meter: [id number] : [id number] Smyzer Inge NA       POC Glucose Once [[id number]]  (Abnormal) Collected: 06/29/22 2046    Specimen: Blood Updated: 06/29/22 2048     Glucose 219 mg/dL      Comment: Meter: [id number] : [id number] Smyzer Inge NA       POC Glucose Once [[id number]]  (Normal) Collected: 06/29/22 1901    Specimen: Blood Updated: 06/29/22 1902     Glucose 101 mg/dL      Comment: Meter: [id number] : [id number] Cricket Harley NA       POC Glucose Once [[id number]]  (Abnormal) Collected: 06/29/22 1842    Specimen: Blood Updated: 06/29/22 1844     Glucose 56 mg/dL      Comment: Meter: [id number] : [id number] Anup SAAVEDRA           Data Review:  Results from last 7 days   Lab Units 07/09/22  0710 07/08/22  0905 07/07/22  1014   SODIUM mmol/L 131* 134* 132*   POTASSIUM mmol/L 3.8 4.2 4.3   CHLORIDE mmol/L 96* 97* 96*   CO2 mmol/L 24.6 24.9 24.0   BUN mg/dL 22* 21* 27*   CREATININE mg/dL 2.17* 2.21* 2.68*   GLUCOSE mg/dL 167* 161* 132*   CALCIUM mg/dL 7.6* 7.7* 7.7*     Results from last 7 days   Lab Units 07/09/22  0711 07/08/22  0905 07/07/22  1014   WBC 10*3/mm3 7.81 8.42 8.44   HEMOGLOBIN g/dL 8.1* 8.1* 7.8*   HEMATOCRIT % 25.8* 26.2* 25.3*   PLATELETS 10*3/mm3 173 175 173             Lab Results   Lab Value Date/Time    TROPONINT 0.092 (C) 12/19/2021 1314    TROPONINT 0.117 (C) 11/30/2021 0153    TROPONINT 0.132 (C) 11/29/2021 2121         Results from last 7 days   Lab Units 07/09/22  0710 07/08/22  0905 07/07/22  1014   ALK PHOS U/L 597* 627* 669*   BILIRUBIN mg/dL 0.5 0.6 0.5   BILIRUBIN DIRECT mg/dL 0.3 0.4* 0.3   ALT (SGPT) U/L 21 24 23   AST (SGOT) U/L 35* 42* 39*             Glucose    Date/Time Value Ref Range Status   07/09/2022 0635 163 (H) 70 - 130 mg/dL Final     Comment:     Meter: [id number] : [id number] Jennie VALDEZA   07/08/2022 2052 201 (H) 70 - 130 mg/dL Final     Comment:     Meter: [id number] : [id number] Jennie Gilliam CNA   07/08/2022 1723 176 (H) 70 - 130 mg/dL Final     Comment:     Meter: [id number] : [id number] Jose Angel Rod NA   07/08/2022 1049 169 (H) 70 - 130 mg/dL Final     Comment:     Meter: [id number] : [id number] Foster D'Shakira Foster NA   07/08/2022 0746 122 70 - 130 mg/dL Final     Comment:     Meter: [id number] : [id number] Foster D'Shakira Foster NA   07/08/2022 0223 129 70 - 130 mg/dL Final     Comment:     Meter: [id number] : [id number] Jennie Gilliam CNA   07/07/2022 2058 128 70 - 130 mg/dL Final     Comment:     Meter: [id number] : [id number] Randolph Melissa NA   07/07/2022 1706 85 70 - 130 mg/dL Final     Comment:     Meter: [id number] : [id number] Foster D'Shakira Foster NA           Past Medical History:   Diagnosis Date   • Acute CVA (cerebrovascular accident) (HCC) 5/1/2022   • Acute on chronic diastolic CHF (congestive heart failure) (HCC)    • CAD (coronary artery disease) 12/20/2021   • Diabetes (HCC)    • Disease of thyroid gland    • GERD (gastroesophageal reflux disease)    • Hyperlipidemia 11/30/2018   • Hypertension    • Rheumatoid arthritis (HCC)        Assessment:  Active Hospital Problems    Diagnosis  POA   • 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       Plan:  Insulin same dose.  Continue same and monitor.  The trend is better.  Pain better.  Don't think she is sick enough to admit to hospital.  HD per renal.  We will repeat some CT scans venous duplex and MRI of left leg.  I think her pain seems to be out of proportion to the way she is acting.  Encouraged her to participate in therapy.    Aris Isbell MD  7/9/2022  10:29 EDT

## 2022-07-09 NOTE — PROGRESS NOTES
Inpatient Rehabilitation Plan of Care Note    Plan of Care  Care Plan Reviewed - No updates at this time.    Safety    Performed Intervention(s)  Fall precautions: bed low and locked, chair alarm and bed alarms in use, nonskid  socks and gait belt in use, call light and personal belongings within reach.  Hourly rounding.      Psychosocial    Performed Intervention(s)  Provide distraction and/or relaxation as needed.  Allow extra time for patient to verbalize needs, concerns, feelings, etc.      Body Systems    Performed Intervention(s)  Dialysis mwf  Monitor weight and I/O.  DM educator consult.  Accuchecks as ordered.      Sphincter Control    Performed Intervention(s)  Monitor I/O.  Bowel meds prn.      Pain    Performed Intervention(s)  Pain meds prn .  Ice to LLE per order    Signed by: Rhonda Coleman RN

## 2022-07-10 ENCOUNTER — APPOINTMENT (OUTPATIENT)
Dept: CT IMAGING | Facility: HOSPITAL | Age: 57
End: 2022-07-10

## 2022-07-10 LAB
ALBUMIN SERPL-MCNC: 2.8 G/DL (ref 3.5–5.2)
ALP SERPL-CCNC: 572 U/L (ref 39–117)
ALT SERPL W P-5'-P-CCNC: 18 U/L (ref 1–33)
ANION GAP SERPL CALCULATED.3IONS-SCNC: 13 MMOL/L (ref 5–15)
AST SERPL-CCNC: 32 U/L (ref 1–32)
BASOPHILS # BLD AUTO: 0.03 10*3/MM3 (ref 0–0.2)
BASOPHILS NFR BLD AUTO: 0.4 % (ref 0–1.5)
BILIRUB CONJ SERPL-MCNC: 0.2 MG/DL (ref 0–0.3)
BILIRUB INDIRECT SERPL-MCNC: 0.2 MG/DL
BILIRUB SERPL-MCNC: 0.4 MG/DL (ref 0–1.2)
BUN SERPL-MCNC: 34 MG/DL (ref 6–20)
BUN/CREAT SERPL: 10.7 (ref 7–25)
CALCIUM SPEC-SCNC: 7.8 MG/DL (ref 8.6–10.5)
CHLORIDE SERPL-SCNC: 96 MMOL/L (ref 98–107)
CO2 SERPL-SCNC: 24 MMOL/L (ref 22–29)
CREAT SERPL-MCNC: 3.17 MG/DL (ref 0.57–1)
DEPRECATED RDW RBC AUTO: 53 FL (ref 37–54)
EGFRCR SERPLBLD CKD-EPI 2021: 16.6 ML/MIN/1.73
EOSINOPHIL # BLD AUTO: 0.11 10*3/MM3 (ref 0–0.4)
EOSINOPHIL NFR BLD AUTO: 1.3 % (ref 0.3–6.2)
ERYTHROCYTE [DISTWIDTH] IN BLOOD BY AUTOMATED COUNT: 18 % (ref 12.3–15.4)
GLUCOSE BLDC GLUCOMTR-MCNC: 134 MG/DL (ref 70–130)
GLUCOSE BLDC GLUCOMTR-MCNC: 163 MG/DL (ref 70–130)
GLUCOSE BLDC GLUCOMTR-MCNC: 172 MG/DL (ref 70–130)
GLUCOSE BLDC GLUCOMTR-MCNC: 184 MG/DL (ref 70–130)
GLUCOSE SERPL-MCNC: 156 MG/DL (ref 65–99)
HCT VFR BLD AUTO: 24.9 % (ref 34–46.6)
HGB BLD-MCNC: 7.9 G/DL (ref 12–15.9)
IMM GRANULOCYTES # BLD AUTO: 0.05 10*3/MM3 (ref 0–0.05)
IMM GRANULOCYTES NFR BLD AUTO: 0.6 % (ref 0–0.5)
LYMPHOCYTES # BLD AUTO: 1.09 10*3/MM3 (ref 0.7–3.1)
LYMPHOCYTES NFR BLD AUTO: 13.3 % (ref 19.6–45.3)
MCH RBC QN AUTO: 26.2 PG (ref 26.6–33)
MCHC RBC AUTO-ENTMCNC: 31.7 G/DL (ref 31.5–35.7)
MCV RBC AUTO: 82.5 FL (ref 79–97)
MONOCYTES # BLD AUTO: 0.67 10*3/MM3 (ref 0.1–0.9)
MONOCYTES NFR BLD AUTO: 8.2 % (ref 5–12)
NEUTROPHILS NFR BLD AUTO: 6.25 10*3/MM3 (ref 1.7–7)
NEUTROPHILS NFR BLD AUTO: 76.2 % (ref 42.7–76)
NRBC BLD AUTO-RTO: 0 /100 WBC (ref 0–0.2)
PHOSPHATE SERPL-MCNC: 3.9 MG/DL (ref 2.5–4.5)
PLATELET # BLD AUTO: 170 10*3/MM3 (ref 140–450)
PMV BLD AUTO: 10.1 FL (ref 6–12)
POTASSIUM SERPL-SCNC: 4.4 MMOL/L (ref 3.5–5.2)
PROT SERPL-MCNC: 5.8 G/DL (ref 6–8.5)
RBC # BLD AUTO: 3.02 10*6/MM3 (ref 3.77–5.28)
SODIUM SERPL-SCNC: 133 MMOL/L (ref 136–145)
WBC NRBC COR # BLD: 8.2 10*3/MM3 (ref 3.4–10.8)

## 2022-07-10 PROCEDURE — 71250 CT THORAX DX C-: CPT

## 2022-07-10 PROCEDURE — 74176 CT ABD & PELVIS W/O CONTRAST: CPT

## 2022-07-10 PROCEDURE — 80048 BASIC METABOLIC PNL TOTAL CA: CPT | Performed by: INTERNAL MEDICINE

## 2022-07-10 PROCEDURE — 84100 ASSAY OF PHOSPHORUS: CPT | Performed by: INTERNAL MEDICINE

## 2022-07-10 PROCEDURE — 82962 GLUCOSE BLOOD TEST: CPT

## 2022-07-10 PROCEDURE — 85025 COMPLETE CBC W/AUTO DIFF WBC: CPT | Performed by: PHYSICAL MEDICINE & REHABILITATION

## 2022-07-10 PROCEDURE — 63710000001 INSULIN LISPRO (HUMAN) PER 5 UNITS: Performed by: HOSPITALIST

## 2022-07-10 PROCEDURE — 80076 HEPATIC FUNCTION PANEL: CPT | Performed by: PHYSICAL MEDICINE & REHABILITATION

## 2022-07-10 RX ORDER — ALBUMIN (HUMAN) 12.5 G/50ML
12.5 SOLUTION INTRAVENOUS AS NEEDED
Status: CANCELLED | OUTPATIENT
Start: 2022-07-11 | End: 2022-07-11

## 2022-07-10 RX ADMIN — MICONAZOLE NITRATE 1 APPLICATION: 2 CREAM TOPICAL at 22:01

## 2022-07-10 RX ADMIN — OXYCODONE 5 MG: 5 TABLET ORAL at 22:00

## 2022-07-10 RX ADMIN — CLONIDINE HYDROCHLORIDE 0.2 MG: 0.1 TABLET ORAL at 21:59

## 2022-07-10 RX ADMIN — SERTRALINE 150 MG: 100 TABLET, FILM COATED ORAL at 09:22

## 2022-07-10 RX ADMIN — OXYCODONE 5 MG: 5 TABLET ORAL at 05:14

## 2022-07-10 RX ADMIN — SODIUM ZIRCONIUM CYCLOSILICATE 5 G: 5 POWDER, FOR SUSPENSION ORAL at 09:22

## 2022-07-10 RX ADMIN — ROPINIROLE HYDROCHLORIDE 0.75 MG: 0.5 TABLET, FILM COATED ORAL at 21:59

## 2022-07-10 RX ADMIN — TAMSULOSIN HYDROCHLORIDE 0.4 MG: 0.4 CAPSULE ORAL at 09:22

## 2022-07-10 RX ADMIN — LEVOTHYROXINE SODIUM 150 MCG: 0.15 TABLET ORAL at 05:14

## 2022-07-10 RX ADMIN — HYDRALAZINE HYDROCHLORIDE 100 MG: 50 TABLET, FILM COATED ORAL at 21:59

## 2022-07-10 RX ADMIN — CARVEDILOL 25 MG: 25 TABLET, FILM COATED ORAL at 09:22

## 2022-07-10 RX ADMIN — GABAPENTIN 300 MG: 300 CAPSULE ORAL at 09:22

## 2022-07-10 RX ADMIN — CLONIDINE HYDROCHLORIDE 0.2 MG: 0.1 TABLET ORAL at 09:22

## 2022-07-10 RX ADMIN — LIDOCAINE 1 PATCH: 50 PATCH CUTANEOUS at 09:22

## 2022-07-10 RX ADMIN — GABAPENTIN 100 MG: 100 CAPSULE ORAL at 15:16

## 2022-07-10 RX ADMIN — GABAPENTIN 300 MG: 300 CAPSULE ORAL at 22:00

## 2022-07-10 RX ADMIN — ASPIRIN 81 MG: 81 TABLET, COATED ORAL at 09:22

## 2022-07-10 RX ADMIN — PANTOPRAZOLE SODIUM 40 MG: 40 TABLET, DELAYED RELEASE ORAL at 05:14

## 2022-07-10 RX ADMIN — LOSARTAN POTASSIUM 100 MG: 100 TABLET, FILM COATED ORAL at 09:22

## 2022-07-10 RX ADMIN — HYDRALAZINE HYDROCHLORIDE 100 MG: 50 TABLET, FILM COATED ORAL at 15:16

## 2022-07-10 RX ADMIN — HYDRALAZINE HYDROCHLORIDE 100 MG: 50 TABLET, FILM COATED ORAL at 05:14

## 2022-07-10 RX ADMIN — Medication 1 TABLET: at 09:22

## 2022-07-10 RX ADMIN — ZOLPIDEM TARTRATE 2.5 MG: 5 TABLET ORAL at 22:04

## 2022-07-10 RX ADMIN — INSULIN LISPRO 2 UNITS: 100 INJECTION, SOLUTION INTRAVENOUS; SUBCUTANEOUS at 11:58

## 2022-07-10 RX ADMIN — MICONAZOLE NITRATE 1 APPLICATION: 2 CREAM TOPICAL at 09:34

## 2022-07-10 RX ADMIN — INSULIN GLARGINE-YFGN 7 UNITS: 100 INJECTION, SOLUTION SUBCUTANEOUS at 09:21

## 2022-07-10 RX ADMIN — OXYCODONE 5 MG: 5 TABLET ORAL at 09:27

## 2022-07-10 RX ADMIN — INSULIN LISPRO 2 UNITS: 100 INJECTION, SOLUTION INTRAVENOUS; SUBCUTANEOUS at 09:21

## 2022-07-10 NOTE — PLAN OF CARE
Problem: Rehabilitation ([facility]) Plan of Care  Goal: Plan of Care Review  Recent Flowsheet Documentation  Taken 7/10/2022 1531 by Andria Hadley RN  Progress: no change  Plan of Care Reviewed With: patient  Goal: Absence of New-Onset Illness or Injury  Intervention: Prevent Fall and Fall Injury  Description: Perform fall risk screening on admission and with change in status.  Communicate fall injury risk to the healthcare team.  Promote environmental and care measures specific to identified risk factors.  Develop strategies to prevent or minimize injury during falls and to get up safely after falling.  Perform regular intentional rounding to assess need for position change, pain assessment, personal needs.  Recent Flowsheet Documentation  Taken 7/10/2022 0921 by Andria Hadley RN  Safety Promotion/Fall Prevention:   safety round/check completed   room organization consistent   muscle strengthening facilitated  Intervention: Prevent Infection  Description: Maintain skin and mucous membrane integrity; promote hand, oral and pulmonary hygiene.  Optimize fluid balance, nutrition, sleep and glycemic control to maximize infection resistance.  Identify potential sources of infection early to prevent or mitigate progression of infection (e.g., wound, lines, devices).  Evaluate ongoing need for invasive devices; remove promptly when no longer indicated.  Recent Flowsheet Documentation  Taken 7/10/2022 1400 by Andria Hadley RN  Infection Prevention:   hand hygiene promoted   personal protective equipment utilized   visitors restricted/screened   single patient room provided  Taken 7/10/2022 1200 by Andria Hadley RN  Infection Prevention:   personal protective equipment utilized   single patient room provided   visitors restricted/screened   hand hygiene promoted  Taken 7/10/2022 1000 by Andria Hadley RN  Infection Prevention:   hand hygiene promoted   personal protective equipment utilized   single  patient room provided   visitors restricted/screened  Taken 7/10/2022 0800 by Andria Hadley RN  Infection Prevention:   hand hygiene promoted   personal protective equipment utilized   single patient room provided   visitors restricted/screened  Intervention: Prevent VTE (Venous Thromboembolism)  Description: Provide ongoing assessment for signs and symptoms of VTE (venous thromboembolism).  Encourage/assist with ongoing mobilization.  Initiate and maintain compression therapy when indicated.  Monitor and provide prophylactic anticoagulation when prescribed.  Recent Flowsheet Documentation  Taken 7/10/2022 0921 by Andria Hadley RN  VTE Prevention/Management:   bilateral   dorsiflexion/plantar flexion performed   Goal Outcome Evaluation:  Plan of Care Reviewed With: patient        Progress: no change

## 2022-07-10 NOTE — PROGRESS NOTES
CC: Debility    SUBJECTIVE:    Seen and examined, no acute events overnight.  Denies chest pain, shortness of breath, f/c. Slept well. Says she feels a little better today than yesterday. Reporting intermittent headache           OBJECTIVE:  Vitals:    07/10/22 0500   BP: 172/80   Pulse: 60   Resp: 18   Temp: 98.2 °F (36.8 °C)   SpO2: 96%       GENERAL: NAD  MENTAL STATUS: Awake alert  HEENT:  NCAT  CARDIOVASCULAR: Sinus rhythm    CHEST:  hemodialysis access right chest  RESPIRATORY: Normal respirations.  Clear to auscultation without wheezes rales or rhonchi  ABDOMEN: Active bowel sounds, soft, nontender.     EXTREMITIES: Left thigh edema persists.  Edema at the left thigh appears slightly less but still prominent.  Continues with increased edema in the left lower leg as well as right lower leg, but also slightly less in the right lower extremity  Has diffuse tenderness to palpation about the left thigh.  No tenderness of the left calf     Left quadricep muscle biopsy site-dressed  No myoclonus.  NEUROLOGIC:    Motor testing not performed as on hemodialysis      Scheduled Meds:aspirin, 81 mg, Oral, Daily  b complex-vitamin c-folic acid, 1 tablet, Oral, Daily  carvedilol, 25 mg, Oral, BID With Meals  cloNIDine, 0.2 mg, Oral, Q12H  epoetin brooke/brooke-epbx, 10,000 Units, Intravenous, Once per day on Mon Wed Fri  gabapentin, 100 mg, Oral, Q24H  gabapentin, 300 mg, Oral, BID  hydrALAZINE, 100 mg, Oral, Q8H  insulin glargine, 7 Units, Subcutaneous, QAM  insulin lispro, 0-7 Units, Subcutaneous, TID AC  levothyroxine, 150 mcg, Oral, Q AM  lidocaine, 1 patch, Transdermal, Q24H  losartan, 100 mg, Oral, Q24H  miconazole, 1 application, Topical, Q12H  pantoprazole, 40 mg, Oral, Q AM  rOPINIRole, 0.75 mg, Oral, Nightly  sertraline, 150 mg, Oral, Daily  sodium zirconium cyclosilicate, 5 g, Oral, Daily  tamsulosin, 0.4 mg, Oral, Daily      Continuous Infusions:   PRN Meds:.•  acetaminophen  •  dextrose  •  dextrose  •   diphenoxylate-atropine  •  glucagon (human recombinant)  •  heparin (porcine)  •  hydrALAZINE  •  nitroglycerin  •  oxyCODONE  •  oxyCODONE  •  sodium chloride  •  zolpidem    Glucose   Date/Time Value Ref Range Status   07/10/2022 1153 184 (H) 70 - 130 mg/dL Final     Comment:     Meter: [id number] : [id number] Carlton Thompson NA   07/10/2022 0623 163 (H) 70 - 130 mg/dL Final     Comment:     Meter: [id number] : [id number] Jennie Gilliam A   07/09/2022 2102 126 70 - 130 mg/dL Final     Comment:     Meter: [id number] : [id number] Jennie Gilliam [facility]   07/09/2022 1629 242 (H) 70 - 130 mg/dL Final     Comment:     Meter: [id number] : [id number] Carlton SAAVEDRA   07/09/2022 1134 277 (H) 70 - 130 mg/dL Final     Comment:     Meter: [id number] : [id number] Carlton SAAVEDRA   07/09/2022 0635 163 (H) 70 - 130 mg/dL Final     Comment:     Meter: [id number] : [id number] Jennie Gilliam [facility]   07/08/2022 2052 201 (H) 70 - 130 mg/dL Final     Comment:     Meter: [id number] : [id number] Jennie Gilliam [facility]   07/08/2022 1723 176 (H) 70 - 130 mg/dL Final     Comment:     Meter: [id number] : [id number] Jose Angel SAAVEDRA     Results from last 7 days   Lab Units 07/10/22  0503 07/09/22  0711 07/08/22  0905   WBC 10*3/mm3 8.20 7.81 8.42   HEMOGLOBIN g/dL 7.9* 8.1* 8.1*   HEMATOCRIT % 24.9* 25.8* 26.2*   PLATELETS 10*3/mm3 170 173 175     Results from last 7 days   Lab Units 07/10/22  0503 07/09/22  0710 07/08/22  0905   SODIUM mmol/L 133* 131* 134*   POTASSIUM mmol/L 4.4 3.8 4.2   CHLORIDE mmol/L 96* 96* 97*   CO2 mmol/L 24.0 24.6 24.9   BUN mg/dL 34* 22* 21*   CREATININE mg/dL 3.17* 2.17* 2.21*   CALCIUM mg/dL 7.8* 7.6* 7.7*   BILIRUBIN mg/dL 0.4 0.5 0.6   ALK PHOS U/L 572* 597* 627*   ALT (SGPT) U/L 18 21 24   AST (SGOT) U/L 32 35* 42*   GLUCOSE mg/dL 156* 167* 161*      [facility] Reference Range & Units 05/27/22 11:30   Creatine Kinase 20 - 180 U/L 94   Aldolase 3.3 - 10.3 U/L 5.6       Imaging  Results (Last 24 Hours)     ** No results found for the last 24 hours. **          ASSESSMENT AND PLAN    # R MCA s/p TPA with subsequent ICH with debility  Initially held antiplatelet/anticoagulation.  Reviewed with neurology on May 20 and resume aspirin 81 mg daily  SBP goal <160  Recommend outpatient Neurology fu - repeat MRI in 3 months        # DM  June 27-hyperglycemia on steroids.  Lantus increased to 30 units twice daily, Humalog 5 units 3 times daily with meals, and all increase sliding scale coverage  June 28 -hypoglycemic after increasing insulin dosing.  Blood glucose up to 98 prior to lunch, will hold sliding scale insulin and give 5 units scheduled with meal  June 30-hypoglycemia-Lantus twice daily decreased from 35 to 20 units.  Scheduled Humalog with meals discontinued  July 1-prednisone has been discontinued.  Reviewed with internal medicine and Lantus decreased to 10 units twice daily and on discharge home to resume her home regimen of Lantus 10 units daily.  She will check her sugar tonight at home and if elevated give Lantus 10 units tonight and then continue with the Lantus 10 units daily tomorrow.  Reviewed with patient and her  that her sugars may be elevated initially as she just completed prednisone.  They were instructed to call for any additional instructions on adjustment in regimen if it remains elevated at home this weekend  July 6-Lantus 7 units daily  July 7-blood sugar range 258-019- yesterday,  this AM     # ESRD   Nephrology following  Inpatient HD    Flomax  Hyperkalemia  July 5-patient had hemodialysis on July 3 and July 4 for hyperkalemia.  Lokelma resumed by nephrology  July 7-had hemodialysis yesterday and has plans for hemodialysis again today  July 8-hemodialysis again today     Infectious disease-   # s/p catheter infection with MRSA  Vancomycin IV with stop date of May 26  May 24-vancomycin random equal 12.90-on vancomycin 500 mg IV on  Bdyidys-Wozhigpu-Ozdkpgsi  May 26-to complete course of vancomycin today  #Urinary tract infection-on Dana 3 urinalysis was negative for infection but noted to have leukocytosis increase and repeat urinalysis on June 6 shows findings consistent with urinary tract infection.  Placed on a course of cefdinir renal dose 3 mg daily for 7 days.  No costophrenic angle tenderness patient reviewed with infectious disease service.  Leukocytosis felt related to the bladder infection and not previous catheter infection.  June 8-urine culture results pending.  On cefdinir.  Culture with E. coli, sensitivity pending  June 9 - E coli sensitive to cephalosporins.  June 21 - continues with leukocytosis WBC  15K today. May be from inflammatory process. Steroids added yesterday.   June 22-urine described as milky characteristic, different from previous-recheck urinalysis with culture if indicated  June 23-Labs are still pending today.  Urinalysis also pending as she does not make much urine.  She had a temperature of 100.2 last evening, afebrile this morning.  June 27 - abnormal UA but urine culture from June 24 no growth  July 1-leukocytosis felt related to the noninfectious process in the left thigh as well as secondary to steroids  July 5-WBC trending down to 15.6 today  July 6-WBC trending down to 12 K     #left hydroureteronephrosis-Dana 3-CT scan shows left hydro ureter nephrosis.  She had some previous dilation of the ureter on comparison the past studies but increased today.  Bladder noted to be markedly distended.  Will do in and out cath now and daily to decompress bladder.  Addendum-intermittent cath for 600 cc.  June 4-once daily intermittent cath 450 cc.  June 5-seen by urology-Nguyễn catheter placed with plans to recheck hydroureter on renal ultrasound in 3 to 5 days.  June 6-Nguyễn catheter placed yesterday by urology, with only 170 cc out so far.  Patient reports did not note any change in her left groin pain after the  in and out cath with decompression of the bladder..  June 8-reviewed with urology service-request noncontrast CT scan stone protocol to evaluate for resolution of the left hydronephrosis with urology planning to see tomorrow to review the films and then make recommendations, urology would recommend leaving the Nguyễn catheter in for the time being.  June 9 - improved left hydroureter on CT , Nguyễn discontinued.   July 1-to follow-up with urology as an outpatient with follow-up CT planned in August.  She occasionally required intermittent straight cath but this was very infrequent.  Home health nursing to follow.  Continues on Flomax  July 7-intermittent straight cath 400 cc       #Anemia-  EPO.  June 6-hemoglobin 7.8  June 21- hemoglobin 8.3  June 27 - hemoglobin 8.7  June 28 - hemoglobin 8.8  July 6-hemoglobin 8.5     Lymphadenopathy-evaluated by hematology oncology while here.  No significant change from scans earlier this year.  She is to repeat a scan in 3 months and follow-up with hematology oncology     #Elevated alkaline phosphatase  June 6-elevated alkaline phosphatase 770, up from 289 one week ago, 140 one month ago. Similar elevation in March, Feb even higher at 1,330 ( intra-abdominal fluid collection, underwent CT-guided aspiration  Revealed blood and cultures did not reveal any growth and therefore antibiotics  discontinued.  Surgery also did evaluate and signed off.), and elevated during admission in January ( She was seen by general surgery who recommended and performed laparoscopic cholecystectomy during that admission).   ALT 70, AST 87, Total bilirubin 0.8. Ca 8.3.  ? If liver source or bone or due to an inflammatory response.  June 7-GGT also elevated.  Seen by gastroenterology.  Baring biliary source.  Liver ultrasound ordered  June 8-liver ultrasound was unremarkable  June 21 - patient declined liver biopsy.   June 27-gastroenterology following peripherally-plans to follow-up as an outpatient and  review option of liver biopsy again if alkaline phosphatase remains elevated  June 28-remains elevated at 503  June 30-alkaline phosphatase lower at 426  July 6-trend back up to 634  July 8-alkaline phosphatase unchanged 627     #Pain - neuropathy BL lower extremity/left thigh pain  Gabapentin/oxycodone.    June 7-patient with lethargy this morning.  May be due to urinary tract infection but with recent increase and gabapentin and oxycodone, will change gabapentin to 200 mg twice a day and decrease oxycodone from 5 back down to 2.5 mg p.o. every 4 hours as needed  June 8-better speed with her processing today  June 21 - pain med regimen recently adjusted with tramadol 25 mg q 4 hours prn, gabapentin 300 g bid, lidoderm patch, oxycodone 2.5 mg q 6 hours prn. Prednisone 60 mg daily added which may help with pain from inflammatory process. Reports pain better today and participated better in therapies.   June 27 - continue present regimen  July 1-home on oxycodone 5 mg p.o. every 4 hours.  Pain dispense #40, gabapentin 300 mg twice daily, Tylenol 500 mg every 6 hours as needed  July 5-increase gabapentin slightly 300-100-300 mg.  Watch for any myoclonic twitching  July 8-had some lethargy earlier today, patient feels that she is tolerating oxycodone and gabapentin.  Was alert when seen on rounds.        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  Seen by infectious disease service with no acute infectious process noted.  Evaluated by hematology regarding lymphadenopathy, lymph node felt too small to biopsy.   Patient was on a course of low-dose prednisone 10 mg for 3 days then 5 mg for 2 days and then another 10 mg dose with out any significant improvement in the swelling or pain.  She has a history of rheumatoid arthritis.  See CT of the left thigh and MRI of the left thigh and pelvis reports   With lumbar radiculoplexitis and diabetic amyotrophy, on review of the literature do not see edema that she presents with associated with the findings or MRI changes in the tendon and musculature with edema.  There is descriptions of MRI changes in the plexus and peripheral nerve.  In terms of treatment options for diabetic amyotrophy , there have not been definitive clinical trials.  Immunomodulation with steroids has been inconclusive as well as other immunomodulation therapy.  Reviewed with the patient  that  feel that she would benefit from seeing her rheumatologist as an outpatient to evaluate this further, as there does appear to be inflammatory cause given the lymphadenopathy and edema.  Rheumatologist does not come to the hospital.  CPK x2 has been normal.  Aldolase has been normal.  May need to look at biopsy of left thigh muscle to assess further.   June 8-patient reviewed with neurology yesterday who will assess and also provide input regarding whether muscle biopsy may be helpful.  June 9 - Discussed with Neurology and Rheumatology - plan is for EMG and then muscle biopsy.   Dana 15 - Left quadricep muscle biopsy was completed 6/15, results pending.  Labs: CKs have been normal, ANCA panel 6/11 was unremarkable  June 21 - started on full treatment dose of Prednisone 60 mg daily yesterday pending path results. Reports pain better so far today. Specimen sent to [facility]. Clinical impression presently focal inflammatory myositis pending final pathology results.   June 22-pain continues better today.  June 23-outside pathology report still pending  June 24 - Patient reports left thigh pain better over past  couple days but still present. Does not have the soreness at medial thigh as before. Complains of pain at mid-thigh. No change in swelling. Does have discomfort with proximal movement in LLE. Walked 320 feet CTG SBA RW today.   Pain improved on steroid. Discontinued atorvastatin. Biopsy results returned from [facility] - see report -Type 2 fiber atrophy, advanced. Denervation/reinnervation. No evidence of inflammatory myopathy or vasculitis. [facility] lab pursuing a dystrophy panel and will report findings in an addendum.  Reviewed with Neurology - as shown symptomatic response, continue Prednisone 60 mg daily for about one more week, then taper off over month.   June 27 - continue present regimen  June 28-increased pain medication regimen to oxycodone 5 mg every 4 hours as needed for severe pain.   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 Treatment would be aspirin which she is already on. Discussed with Neurology and as been on Prednisone 60 mg for only 1.5 week, will stop and not do taper. Discussed with Internal Medicine who will adjust insulin.  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  Reviewed with the patient that treatment for this is aspirin which she is already on.  She may do walking activities but would avoid strenuous activities with left quadricep strengthening.  She may strengthen the other 3 extremities as tolerated.  Reviewed may take 3 months to resolve.  July 7-continues with left thigh pain.  She is noted to have increased edema in both lower extremities but particularly the left thigh compared to recent.  Lokelma has been associated with fluid retention which may explain increase edema in part.  Continues on aspirin for suspected diabetic muscular infarct.  No update report yet on additional studies for muscle  biopsy.  Initial report was June 24 with additional studies planned    # HTN   Coreg  Clonidine  Hydralazine  Losartan  Nephrology/internal medicine following     #Hypothyroidism  Levothyroxine  June 9 - recheck TSH- addendum Dana 10- On Nov 30, 2021 , on Dec 2, 2021 T4 = 0.90, on Dec 9, T4=1.68. On May 7, TSH = 134. Has been on Levothyroxine 125 mcg/day it appears since at least Dec 13, 2021. See Dr Templeton's discharge note from Dec 17,2021 which discusses thyroid labs/dosing at that time. On June 9, 2022 TSH = 54.4.   Check free T4. Will get evaluation from Internal Medicine regarding thyroid studies/levothyroxine dosing.  June 11 - levothyroxine increased to 150mcg daily-continue this dose and she will need follow up TSH in 4-6 weeks from June 11 as an outpatient  July 5-repeat TSH and T4 ordered for Monday, July 11     #CAD  ASA held in setting of ICH - restart aspirin 81 mg daily on May 20, 2022 per Neurology     #Depression   Sertraline  June 28-sertraline dose increased  July 5-reviewed with psychiatry service-continue present regimen    Insomnia-improved on Ambien 5 mg  July 6-fatigue during the day-decrease Ambien 2.5 mg nightly and assess response  July 7-tolerated Ambien 2.5    #GERD   PPI     #Restless leg syndrome  Requip     #DVT prophylaxis-SCDs ordered but patient refusing.  Per neurology note May 11-continue off anticoagulation/antiplatelet for now. Restarted ASA 81 mg on May 20.  May 16-reviewed with patient and try venous foot compression devices  May 20-also not able to tolerate venous foot compression devices.  May 25-bilateral lower extremity venous duplex negative for DVT    Diarrhea-July 7-loose stool x5 over the last day.  Lokelma is not associated with diarrhea.  C. difficile was negative on July 5.  Was on cefdinir from June 7 to June 13 and cefazolin one-time dose on Dana 15.  - will recheck C. difficile        -  comprehensive inpatient rehabilitation program working with PT, OT,  SLP for a minimum of three hours per day, five days per week. - will monitor for progress     TEAM CONF - MAY 17- BED SBA. TRANSFER MIN. 4 STAIRS MIN. GAIT 160 FEET CTG RW. SLOW TO PROCESS. BATH MIN. LBD MIN. UBD MIN. GROOMING SET UP. TOILETING MIN . COGNITION OVERALL MILD DEFICITS. VISUAL IMPAIRMENT IMPACTS SOME TESTING. ESRD-HD. ANTIBIOTICS - VANCOMYCIN 10.90 YESTERDAY.  ELOS - 1-2 WEEKS.      TEAM CONF - MAY 24 - ALWAYS SO PROCESSING AFTER EACH DIALYSIS SESSION. AT HOME WOULD GO BACK TO HOME AND SLEEP. BED MIN ASSIST. TRANSFERS MIN. GAIT 80   FEET RW CTG. 4 STAIRS CTG. TOILET TRASNFERS AND SHOWER TRANSFERS MIN CTG. BATH CTG. LBD CTG. UBD SET UP. GROOMING SET UP. TOILETING CTG.   LEFT GROIN - ADDUCTOR PAIN - There is mild joint space narrowing involving both hips symmetrically. There are also some minimal degenerative changes involving both hips. The overall appearance is similar to the study of 12/16/2021. MODERATE WORD RETRIEVAL DEFICITS. IMPAIRED VISION AFFECTS HOME MANAGEMENT TASKS. HYPOGLLYCEMIA AFTER SSI .DECREASED TO 0-7 UNITS.   ELOS - ONE WEEK.         TEAM CONF - MAY 31 - TRANSFERS CTG. GAIT 40- FEET CTG RW LIMITED BY THIGH PAIN. TOILET TRANSFERS CTG. BATH CTG. LBD CTG. UBD SET UP. TOILETING CTG.  GROOMING SET UP. COGNITION - MODERATE WORD RETRIEVAL DEFICITS, MODERATE IMPAIRED MEMORY IMPACTED BY FATIGUE, STILL SLOW PROCESSING.  BNE (Active)  Att'n. - Mildly Imp.  Exec. Fx. - Mildly Imp., slowed processing speed  Rsng/Jgmnt - WNL  Arith - Mod Imp.  Visuospatial Skills - DNA, pt declined citing poor vision  Visual Mem. DNA  Verbal Mem. - WNL  Emot - Pt endorsed mild dysphoria and anxiousness secodnary to current inpatient  Stay.  CONTINENT BOWEL. OCCASIONAL VOID. ESRD-HD. ON INSULIN. SKIN INTACT. LIDODERM PATCH TO LEFT ADDUCTOR TENDON. COURSE OF STEROID FOR LEFT THIGH JEREMI. CONSULTED ORTHO FOR INPUT.  ELOS - POSSIBLY Thursday DEPENDING ON FURTHER EVALUATIONS.            TEAM CONF - JUNE 21 -   DECLINED THERAPY DUE TO PAIN.  AT MOST RECENT THERAPY WHEN PARTICIPATED, TRANSFERS MIN. GAIT 80 FEET MIN / CTG MIN ASSIST RW. TOILET TRANSFERS CTG. TOILETING CTG. MODERATE IMPAIRED VERBAL MEMORY. PAIN MANAGEMENT - MEDS ADJUSTED - OXYCODONE 2.5 MG Q 6 HOURS PRN, TRAMADOL 25 MG Q 4 HOURS. MUSCLE BIOPSY RESULTS PENDING. STARTING ON PREDNISONE 60 MG DAILY YESTERDAY. WILL NEED TO ADJUST INSULING, BLOOD GLUCOSE > 300 THIS AM. GASTROENTEROLOGY EVALUATING LIVER. PATIENT DECLINES LIVER BIOPSY.  HYPOTHYROID - levothyroxine increased to 150mcg daily-continue this dose and she will need follow up TSH in 4-6 weeks from June 11 as an outpatient  ELOS -   1.5 WEEKS     TEAM CONF - June 28 - BED MIN  SIT TO SUPINE, SBA SUPINE TO SIT. CAR TRANSFERS CTG. TRANSFERS MIN ASSIST. 4 STAIRS MIN. GAIT 240 FEET CTG SBA. TOILET TRANSFERS MIN. UBB SBA. LBB MIN WITH LLE. UBD SET UP. LBD MOD ASSIST LLE.   INSULIN ADJUSTED FOR ELEVATED BLOOD GLUCOSE ON STEROIDS. ON PREDNISONE 60 MG DAILY AND PLAN TO TAPER OVER NEXT MONTH . BIOPSY REPORT SENT TO OUTPATIENT RHEUMATOLOGIST YESTERDAY. ADDITIONAL STUDIES ON BIOPSY PENDING.   BLADDER SCAN 400 CC BUT ONLY 200 CC ON INTERMITTENT STRAIGHT CATH. LEFT QUAD MUSCLE BIOPSY SITE HEALING.  ESRD - HD.   ELOS - Thursday WITH HOME HEALTH PT, OT, AND NURSING FOR DIABETES AND MONITORING ON STEROIDS.   Addendum-was not able to do car transfer after hemodialysis on Friday, July 1 and continued with inpatient rehab course.    Rehabilitation-July 7-Patient has not been able to participate in therapy for 3 hours a day.  Will ask internal medicine to assess for transfer to the acute care wing in the hospital  July 8-Was evaluated by internal medicine service who did not feel that she needed transfer to the acute care wing of the hospit    7/9  - Continue comprehensive inpatient rehabilitation program  - Continue close medical monitoring for functional progress, decline or additional intervenable factors to amplify  functional recovery  - Labs reviewed, hgb stable  - Nephrology following, fluid restriction to 1200 mLs  - IM following, checking venous duplex, MRI L femur, CT chest, abdomen, pelvis    7/10  - Continue comprehensive inpatient rehabilitation program  - Continue close medical monitoring for functional progress, decline or additional intervenable factors to amplify functional recovery  - Labs reviewed  - Nephrology following, planning HD tomorrow, continue fluid restriction  - IM following, follow imaging results when available    Mat Gaston MD       During rounds, used appropriate personal protective equipment including mask and gloves.  Additional gown if indicated.  Mask used was standard procedure mask. Appropriate PPE was worn during the entire visit.  Hand hygiene was completed before and after.

## 2022-07-10 NOTE — PROGRESS NOTES
"DAILY PROGRESS NOTE  [facility]    Patient Identification:  Name: Zaina Martinez  Age: 56 y.o.  Sex: female  :  1965  MRN: [id number]         Primary Care Physician: Karson Oreilly MD    Subjective:  Interval History:She complains of leg pain.  Better today.     Objective:    Scheduled Meds:aspirin, 81 mg, Oral, Daily  b complex-vitamin c-folic acid, 1 tablet, Oral, Daily  carvedilol, 25 mg, Oral, BID With Meals  cloNIDine, 0.2 mg, Oral, Q12H  epoetin brooke/brooke-epbx, 10,000 Units, Intravenous, Once per day on   gabapentin, 100 mg, Oral, Q24H  gabapentin, 300 mg, Oral, BID  hydrALAZINE, 100 mg, Oral, Q8H  insulin glargine, 7 Units, Subcutaneous, QAM  insulin lispro, 0-7 Units, Subcutaneous, TID AC  levothyroxine, 150 mcg, Oral, Q AM  lidocaine, 1 patch, Transdermal, Q24H  losartan, 100 mg, Oral, Q24H  miconazole, 1 application, Topical, Q12H  pantoprazole, 40 mg, Oral, Q AM  rOPINIRole, 0.75 mg, Oral, Nightly  sertraline, 150 mg, Oral, Daily  sodium zirconium cyclosilicate, 5 g, Oral, Daily  tamsulosin, 0.4 mg, Oral, Daily      Continuous Infusions:     Vital signs in last 24 hours:  Temp:  [98.2 °F (36.8 °C)-98.4 °F (36.9 °C)] 98.2 °F (36.8 °C)  Heart Rate:  [53-62] 60  Resp:  [12-18] 18  BP: (103-172)/(54-80) 172/80    Intake/Output:    Intake/Output Summary (Last 24 hours) at 7/10/2022 1256  Last data filed at 2022 1700  Gross per 24 hour   Intake 150 ml   Output --   Net 150 ml       Exam:  /80 (BP Location: Right arm, Patient Position: Lying)   Pulse 60   Temp 98.2 °F (36.8 °C) (Oral)   Resp 18   Ht 157.5 cm (62.01\")   Wt 75.5 kg (166 lb 7.2 oz)   SpO2 96%   BMI 30.44 kg/m²     General Appearance:    Alert, cooperative, no distress   Head:    Normocephalic, without obvious abnormality, atraumatic   Eyes:       Throat:   Lips, tongue, gums normal   Neck:   Supple, symmetrical, trachea midline, no JVD   Lungs:     Clear to auscultation bilaterally, respirations " unlabored   Chest Wall:    No tenderness or deformity    Heart:    Regular rate and rhythm, S1 and S2 normal, no murmur,no  Rub or gallop   Abdomen:     Soft, nontender, bowel sounds active, no masses, no organomegaly    Extremities:   Extremities normal, atraumatic, no cyanosis, improving edema in lower extremities   Pulses:      Skin:   Skin is warm and dry,  no rashes or palpable lesions   Neurologic:   no focal deficits noted      Lab Results (last 72 hours)     Procedure Component Value Units Date/Time    POC Glucose Once [[id number]]  (Normal) Collected: 07/02/22 1719    Specimen: Blood Updated: 07/02/22 1721     Glucose 102 mg/dL      Comment: Meter: [id number] : [id number] Smooth Miranda RN       POC Glucose Once [[id number]]  (Abnormal) Collected: 07/02/22 1631    Specimen: Blood Updated: 07/02/22 1635     Glucose 41 mg/dL      Comment: RN Notified R and V Meter: [id number] : [id number] Smooth Miranda RN       POC Glucose Once [[id number]]  (Normal) Collected: 07/02/22 1151    Specimen: Blood Updated: 07/02/22 1152     Glucose 97 mg/dL      Comment: Meter: [id number] : [id number] Randolph SAAVEDRA       POC Glucose Once [[id number]]  (Normal) Collected: 07/02/22 1024    Specimen: Blood Updated: 07/02/22 1025     Glucose 120 mg/dL      Comment: Meter: [id number] : [id number] Smooth Miranda RN       Basic Metabolic Panel [[id number]]  (Abnormal) Collected: 07/02/22 0828    Specimen: Blood Updated: 07/02/22 0933     Glucose 65 mg/dL      BUN 40 mg/dL      Creatinine 2.50 mg/dL      Sodium 135 mmol/L      Potassium 5.3 mmol/L      Chloride 98 mmol/L      CO2 24.0 mmol/L      Calcium 7.6 mg/dL      BUN/Creatinine Ratio 16.0     Anion Gap 13.0 mmol/L      eGFR 22.1 mL/min/1.73      Comment: National Kidney Foundation and American Society of Nephrology (ASN) Task Force recommended calculation based on the Chronic Kidney Disease Epidemiology Collaboration (CKD-EPI) equation refit without adjustment for  race.       Narrative:      GFR Normal >60  Chronic Kidney Disease <60  Kidney Failure <15      Hepatic Function Panel [[id number]]  (Abnormal) Collected: 07/02/22 0828    Specimen: Blood Updated: 07/02/22 0931     Total Protein 5.5 g/dL      Albumin 2.80 g/dL      ALT (SGPT) 14 U/L      AST (SGOT) 28 U/L      Alkaline Phosphatase 380 U/L      Total Bilirubin 0.4 mg/dL      Bilirubin, Direct <0.2 mg/dL      Bilirubin, Indirect --     Comment: Unable to calculate       Phosphorus [[id number]]  (Abnormal) Collected: 07/02/22 0828    Specimen: Blood Updated: 07/02/22 0931     Phosphorus 4.6 mg/dL     CBC & Differential [[id number]]  (Abnormal) Collected: 07/02/22 0828    Specimen: Blood Updated: 07/02/22 0904    Narrative:      The following orders were created for panel order CBC & Differential.  Procedure                               Abnormality         Status                     ---------                               -----------         ------                     CBC Auto Differential[[id number]]        Abnormal            Final result                 Please view results for these tests on the individual orders.    CBC Auto Differential [[id number]]  (Abnormal) Collected: 07/02/22 0828    Specimen: Blood Updated: 07/02/22 0904     WBC 21.35 10*3/mm3      RBC 3.21 10*6/mm3      Hemoglobin 8.4 g/dL      Hematocrit 26.8 %      MCV 83.5 fL      MCH 26.2 pg      MCHC 31.3 g/dL      RDW 18.9 %      RDW-SD 56.6 fl      MPV 10.0 fL      Platelets 234 10*3/mm3      Neutrophil % 81.5 %      Lymphocyte % 10.6 %      Monocyte % 6.7 %      Eosinophil % 0.1 %      Basophil % 0.1 %      Immature Grans % 1.0 %      Neutrophils, Absolute 17.40 10*3/mm3      Lymphocytes, Absolute 2.27 10*3/mm3      Monocytes, Absolute 1.42 10*3/mm3      Eosinophils, Absolute 0.02 10*3/mm3      Basophils, Absolute 0.02 10*3/mm3      Immature Grans, Absolute 0.22 10*3/mm3      nRBC 0.0 /100 WBC     POC Glucose Once [[id number]]  (Normal) Collected:  07/02/22 0614    Specimen: Blood Updated: 07/02/22 0615     Glucose 92 mg/dL      Comment: Meter: [id number] : [id number] Sosei NA       POC Glucose Once [[id number]]  (Abnormal) Collected: 07/02/22 0253    Specimen: Blood Updated: 07/02/22 0254     Glucose 140 mg/dL      Comment: Meter: [id number] : [id number] Kushal Choe RN       POC Glucose Once [[id number]]  (Abnormal) Collected: 07/01/22 2036    Specimen: Blood Updated: 07/01/22 2037     Glucose 139 mg/dL      Comment: Meter: [id number] : [id number] Sosei NA       Hepatic Function Panel [[id number]]  (Abnormal) Collected: 07/01/22 1827    Specimen: Blood Updated: 07/01/22 1926     Total Protein 6.0 g/dL      Albumin 3.20 g/dL      ALT (SGPT) 18 U/L      AST (SGOT) 33 U/L      Alkaline Phosphatase 450 U/L      Total Bilirubin 0.5 mg/dL      Bilirubin, Direct 0.2 mg/dL      Bilirubin, Indirect 0.3 mg/dL     Basic Metabolic Panel [[id number]]  (Abnormal) Collected: 07/01/22 1827    Specimen: Blood Updated: 07/01/22 1926     Glucose 75 mg/dL      BUN 34 mg/dL      Creatinine 2.21 mg/dL      Sodium 134 mmol/L      Potassium 5.4 mmol/L      Chloride 97 mmol/L      CO2 24.0 mmol/L      Calcium 7.9 mg/dL      BUN/Creatinine Ratio 15.4     Anion Gap 13.0 mmol/L      eGFR 25.6 mL/min/1.73      Comment: National Kidney Foundation and American Society of Nephrology (ASN) Task Force recommended calculation based on the Chronic Kidney Disease Epidemiology Collaboration (CKD-EPI) equation refit without adjustment for race.       Narrative:      GFR Normal >60  Chronic Kidney Disease <60  Kidney Failure <15      Phosphorus [[id number]]  (Normal) Collected: 07/01/22 1827    Specimen: Blood Updated: 07/01/22 1926     Phosphorus 3.2 mg/dL     CBC & Differential [[id number]]  (Abnormal) Collected: 07/01/22 1827    Specimen: Blood Updated: 07/01/22 1909    Narrative:      The following orders were created for panel order CBC & Differential.  Procedure                                Abnormality         Status                     ---------                               -----------         ------                     CBC Auto Differential[[id number]]        Abnormal            Final result                 Please view results for these tests on the individual orders.    CBC Auto Differential [[id number]]  (Abnormal) Collected: 07/01/22 1827    Specimen: Blood Updated: 07/01/22 1909     WBC 24.21 10*3/mm3      RBC 3.60 10*6/mm3      Hemoglobin 9.4 g/dL      Hematocrit 30.0 %      MCV 83.3 fL      MCH 26.1 pg      MCHC 31.3 g/dL      RDW 19.0 %      RDW-SD 56.4 fl      MPV 10.3 fL      Platelets 288 10*3/mm3      Neutrophil % 94.6 %      Lymphocyte % 2.7 %      Monocyte % 1.4 %      Eosinophil % 0.0 %      Basophil % 0.1 %      Immature Grans % 1.2 %      Neutrophils, Absolute 22.89 10*3/mm3      Lymphocytes, Absolute 0.65 10*3/mm3      Monocytes, Absolute 0.34 10*3/mm3      Eosinophils, Absolute 0.00 10*3/mm3      Basophils, Absolute 0.03 10*3/mm3      Immature Grans, Absolute 0.30 10*3/mm3      nRBC 0.0 /100 WBC     POC Glucose Once [[id number]]  (Abnormal) Collected: 07/01/22 1606    Specimen: Blood Updated: 07/01/22 1607     Glucose 287 mg/dL      Comment: Meter: [id number] : [id number] Dierken Jessica L RN       POC Glucose Once [[id number]]  (Normal) Collected: 07/01/22 1246    Specimen: Blood Updated: 07/01/22 1247     Glucose 126 mg/dL      Comment: Meter: [id number] : [id number] Dierken Jessica L RN       POC Glucose Once [[id number]]  (Normal) Collected: 07/01/22 0639    Specimen: Blood Updated: 07/01/22 0641     Glucose 95 mg/dL      Comment: Meter: [id number] : [id number] [employer] CNA       POC Glucose Once [[id number]]  (Abnormal) Collected: 07/01/22 0208    Specimen: Blood Updated: 07/01/22 0210     Glucose 186 mg/dL      Comment: Meter: [id number] : [id number] Schneider Daliyan CNA       POC Glucose Once [[id number]]  (Abnormal) Collected: 06/30/22 2032     Specimen: Blood Updated: 06/30/22 2034     Glucose 251 mg/dL      Comment: Meter: [id number] : [id number] Jennie Gilliam CNA       POC Glucose Once [[id number]]  (Abnormal) Collected: 06/30/22 1615    Specimen: Blood Updated: 06/30/22 1616     Glucose 197 mg/dL      Comment: Meter: [id number] : [id number] Makharadze Firuza NA       POC Glucose Once [[id number]]  (Normal) Collected: 06/30/22 1149    Specimen: Blood Updated: 06/30/22 1201     Glucose 100 mg/dL      Comment: Meter: [id number] : [id number] Makharadze Firuza NA       POC Glucose Once [[id number]]  (Abnormal) Collected: 06/30/22 1115    Specimen: Blood Updated: 06/30/22 1117     Glucose 68 mg/dL      Comment: Meter: [id number] : [id number] Makharadze Firuza NA       POC Glucose Once [[id number]]  (Abnormal) Collected: 06/30/22 1100    Specimen: Blood Updated: 06/30/22 1102     Glucose 55 mg/dL      Comment: Meter: [id number] : [id number] Makharadze Firuza NA       POC Glucose Once [[id number]]  (Abnormal) Collected: 06/30/22 1045    Specimen: Blood Updated: 06/30/22 1045     Glucose 55 mg/dL      Comment: Meter: [id number] : [id number] Edson MCELROY RN       Basic Metabolic Panel [[id number]]  (Abnormal) Collected: 06/30/22 0912    Specimen: Blood Updated: 06/30/22 1041     Glucose 91 mg/dL      BUN 43 mg/dL      Creatinine 2.61 mg/dL      Sodium 134 mmol/L      Potassium 5.3 mmol/L      Chloride 100 mmol/L      CO2 23.0 mmol/L      Calcium 7.6 mg/dL      BUN/Creatinine Ratio 16.5     Anion Gap 11.0 mmol/L      eGFR 21.0 mL/min/1.73      Comment: National Kidney Foundation and American Society of Nephrology (ASN) Task Force recommended calculation based on the Chronic Kidney Disease Epidemiology Collaboration (CKD-EPI) equation refit without adjustment for race.       Narrative:      GFR Normal >60  Chronic Kidney Disease <60  Kidney Failure <15      POC Glucose Once [[id number]]  (Abnormal) Collected: 06/30/22 1034    Specimen: Blood  Updated: 06/30/22 1040     Glucose 43 mg/dL      Comment: Result Not Confirmed Meter: [id number] : [id number] Edson MCELROY RN       Hepatic Function Panel [[id number]]  (Abnormal) Collected: 06/30/22 0912    Specimen: Blood Updated: 06/30/22 1031     Total Protein 5.9 g/dL      Albumin 2.60 g/dL      ALT (SGPT) 15 U/L      AST (SGOT) 30 U/L      Alkaline Phosphatase 426 U/L      Total Bilirubin 0.4 mg/dL      Bilirubin, Direct 0.3 mg/dL      Bilirubin, Indirect 0.1 mg/dL     Phosphorus [[id number]]  (Normal) Collected: 06/30/22 0912    Specimen: Blood Updated: 06/30/22 1031     Phosphorus 4.0 mg/dL     CBC & Differential [[id number]]  (Abnormal) Collected: 06/30/22 0912    Specimen: Blood Updated: 06/30/22 1010    Narrative:      The following orders were created for panel order CBC & Differential.  Procedure                               Abnormality         Status                     ---------                               -----------         ------                     CBC Auto Differential[[id number]]        Abnormal            Final result                 Please view results for these tests on the individual orders.    CBC Auto Differential [[id number]]  (Abnormal) Collected: 06/30/22 0912    Specimen: Blood Updated: 06/30/22 1010     WBC 19.91 10*3/mm3      RBC 3.25 10*6/mm3      Hemoglobin 8.5 g/dL      Hematocrit 27.2 %      MCV 83.7 fL      MCH 26.2 pg      MCHC 31.3 g/dL      RDW 18.1 %      RDW-SD 53.9 fl      MPV 10.0 fL      Platelets 315 10*3/mm3      Neutrophil % 81.8 %      Lymphocyte % 11.2 %      Monocyte % 5.4 %      Eosinophil % 0.2 %      Basophil % 0.1 %      Immature Grans % 1.3 %      Neutrophils, Absolute 16.30 10*3/mm3      Lymphocytes, Absolute 2.23 10*3/mm3      Monocytes, Absolute 1.07 10*3/mm3      Eosinophils, Absolute 0.03 10*3/mm3      Basophils, Absolute 0.02 10*3/mm3      Immature Grans, Absolute 0.26 10*3/mm3      nRBC 0.1 /100 WBC     POC Glucose Once [[id number]]  (Normal)  Collected: 06/30/22 0631    Specimen: Blood Updated: 06/30/22 0633     Glucose 117 mg/dL      Comment: Meter: [id number] : [id number] Smyzer Inge NA       POC Glucose Once [[id number]]  (Abnormal) Collected: 06/30/22 0310    Specimen: Blood Updated: 06/30/22 0311     Glucose 181 mg/dL      Comment: Meter: [id number] : [id number] Smyzer Igne NA       POC Glucose Once [[id number]]  (Abnormal) Collected: 06/29/22 2046    Specimen: Blood Updated: 06/29/22 2048     Glucose 219 mg/dL      Comment: Meter: [id number] : [id number] Smyzer Inge NA       POC Glucose Once [[id number]]  (Normal) Collected: 06/29/22 1901    Specimen: Blood Updated: 06/29/22 1902     Glucose 101 mg/dL      Comment: Meter: [id number] : [id number] Cricket Harley NA       POC Glucose Once [[id number]]  (Abnormal) Collected: 06/29/22 1842    Specimen: Blood Updated: 06/29/22 1844     Glucose 56 mg/dL      Comment: Meter: [id number] : [id number] Anup SAAVEDRA           Data Review:  Results from last 7 days   Lab Units 07/10/22  0503 07/09/22  0710 07/08/22  0905   SODIUM mmol/L 133* 131* 134*   POTASSIUM mmol/L 4.4 3.8 4.2   CHLORIDE mmol/L 96* 96* 97*   CO2 mmol/L 24.0 24.6 24.9   BUN mg/dL 34* 22* 21*   CREATININE mg/dL 3.17* 2.17* 2.21*   GLUCOSE mg/dL 156* 167* 161*   CALCIUM mg/dL 7.8* 7.6* 7.7*     Results from last 7 days   Lab Units 07/10/22  0503 07/09/22  0711 07/08/22  0905   WBC 10*3/mm3 8.20 7.81 8.42   HEMOGLOBIN g/dL 7.9* 8.1* 8.1*   HEMATOCRIT % 24.9* 25.8* 26.2*   PLATELETS 10*3/mm3 170 173 175             Lab Results   Lab Value Date/Time    TROPONINT 0.092 (C) 12/19/2021 1314    TROPONINT 0.117 (C) 11/30/2021 0153    TROPONINT 0.132 (C) 11/29/2021 2121         Results from last 7 days   Lab Units 07/10/22  0503 07/09/22  0710 07/08/22  0905   ALK PHOS U/L 572* 597* 627*   BILIRUBIN mg/dL 0.4 0.5 0.6   BILIRUBIN DIRECT mg/dL 0.2 0.3 0.4*   ALT (SGPT) U/L 18 21 24   AST (SGOT) U/L 32 35* 42*              Glucose   Date/Time Value Ref Range Status   07/10/2022 1153 184 (H) 70 - 130 mg/dL Final     Comment:     Meter: [id number] : [id number] Calrton Thompson NA   07/10/2022 0623 163 (H) 70 - 130 mg/dL Final     Comment:     Meter: [id number] : [id number] Jennie VALDEZA   07/09/2022 2102 126 70 - 130 mg/dL Final     Comment:     Meter: [id number] : [id number] Jennie VALDEZA   07/09/2022 1629 242 (H) 70 - 130 mg/dL Final     Comment:     Meter: [id number] : [id number] Carlton Thompson NA   07/09/2022 1134 277 (H) 70 - 130 mg/dL Final     Comment:     Meter: [id number] : [id number] Carlton Thompson NA   07/09/2022 0635 163 (H) 70 - 130 mg/dL Final     Comment:     Meter: [id number] : [id number] Jennie VALDEZA   07/08/2022 2052 201 (H) 70 - 130 mg/dL Final     Comment:     Meter: [id number] : [id number] Jennie VALDEZA   07/08/2022 1723 176 (H) 70 - 130 mg/dL Final     Comment:     Meter: [id number] : [id number] Jose Angeljosy Rod QUENTIN           Past Medical History:   Diagnosis Date   • Acute CVA (cerebrovascular accident) (HCC) 5/1/2022   • Acute on chronic diastolic CHF (congestive heart failure) ([facility])    • CAD (coronary artery disease) 12/20/2021   • Diabetes ([facility])    • Disease of thyroid gland    • GERD (gastroesophageal reflux disease)    • Hyperlipidemia 11/30/2018   • Hypertension    • Rheumatoid arthritis (HCC)        Assessment:  Active Hospital Problems    Diagnosis  POA   • 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       Plan:  Insulin same dose.  Continue same and monitor.  The trend is better.  Pain better.  Don't think she is sick enough to admit to hospital.  HD per renal.  We will repeat some CT scans  and MRI of left leg. Venous duplex negative.  I think her pain seems to be out of proportion to the way she is acting.  Encouraged her to participate in therapy.    Aris Isbell MD  7/10/2022  12:56 EDT

## 2022-07-10 NOTE — PROGRESS NOTES
Inpatient Rehabilitation Plan of Care Note    Plan of Care  Care Plan Reviewed - No updates at this time.    Safety    Performed Intervention(s)  Fall precautions: bed low and locked, chair alarm and bed alarms in use, nonskid  socks and gait belt in use, call light and personal belongings within reach.  Hourly rounding.      Psychosocial    Performed Intervention(s)  Provide distraction and/or relaxation as needed.  Allow extra time for patient to verbalize needs, concerns, feelings, etc.      Body Systems    Performed Intervention(s)  Dialysis mwf  Monitor weight and I/O.  DM educator consult.  Accuchecks as ordered.      Sphincter Control    Performed Intervention(s)  Monitor I/O.  Bowel meds prn.      Pain    Performed Intervention(s)  Pain meds prn .  Ice to LLE per order    Signed by: Jannie Carrillo RN

## 2022-07-10 NOTE — PROGRESS NOTES
[facility] Progress Note      Patient Name: Zaina Martinez  : 1965  MRN: [id number]  Primary Care Physician:  Karson Oreilly MD  Date of admission: 2022    Subjective     Interval History:   Follow up ESRD  Last HD was  with 2 L removed; usual schedule is MWF  Breathing is comfortable on room air; legs less puffy      Review of Systems:   As noted above    Objective     Vitals:   Temp:  [98.2 °F (36.8 °C)-98.4 °F (36.9 °C)] 98.2 °F (36.8 °C)  Heart Rate:  [53-62] 60  Resp:  [12-18] 18  BP: (103-172)/(54-80) 172/80    Intake/Output Summary (Last 24 hours) at 7/10/2022 1105  Last data filed at 2022 1700  Gross per 24 hour   Intake 300 ml   Output --   Net 300 ml       Physical Exam:   General: Awake, oriented, NAD, chronically ill  HEENT: AT/NC; periorbital edema better  Neck: RIJ TDC  Lungs: Diminished in bases, not labored on room air  Heart: RRR no s3 or rub. 2/6 systolic murmur  Abdomen: +bs, soft, not distended  Extremities: 1-2+ pitting edema bilateral extremities.  Neuro:  Moves all 4 ext.     Scheduled Meds:     aspirin, 81 mg, Oral, Daily  b complex-vitamin c-folic acid, 1 tablet, Oral, Daily  carvedilol, 25 mg, Oral, BID With Meals  cloNIDine, 0.2 mg, Oral, Q12H  epoetin brooke/brooke-epbx, 10,000 Units, Intravenous, Once per day on   gabapentin, 100 mg, Oral, Q24H  gabapentin, 300 mg, Oral, BID  hydrALAZINE, 100 mg, Oral, Q8H  insulin glargine, 7 Units, Subcutaneous, QAM  insulin lispro, 0-7 Units, Subcutaneous, TID AC  levothyroxine, 150 mcg, Oral, Q AM  lidocaine, 1 patch, Transdermal, Q24H  losartan, 100 mg, Oral, Q24H  miconazole, 1 application, Topical, Q12H  pantoprazole, 40 mg, Oral, Q AM  rOPINIRole, 0.75 mg, Oral, Nightly  sertraline, 150 mg, Oral, Daily  sodium zirconium cyclosilicate, 5 g, Oral, Daily  tamsulosin, 0.4 mg, Oral, Daily      IV Meds:        Results Reviewed:   I have personally reviewed the results from the time of  this admission to 7/10/2022 11:05 EDT     Results from last 7 days   Lab Units 07/10/22  0503 07/09/22  0710 07/08/22  0905   SODIUM mmol/L 133* 131* 134*   POTASSIUM mmol/L 4.4 3.8 4.2   CHLORIDE mmol/L 96* 96* 97*   CO2 mmol/L 24.0 24.6 24.9   BUN mg/dL 34* 22* 21*   CREATININE mg/dL 3.17* 2.17* 2.21*   CALCIUM mg/dL 7.8* 7.6* 7.7*   BILIRUBIN mg/dL 0.4 0.5 0.6   ALK PHOS U/L 572* 597* 627*   ALT (SGPT) U/L 18 21 24   AST (SGOT) U/L 32 35* 42*   GLUCOSE mg/dL 156* 167* 161*       Estimated Creatinine Clearance: 18.9 mL/min (A) (by C-G formula based on SCr of 3.17 mg/dL (H)).    Results from last 7 days   Lab Units 07/10/22  0503 07/09/22  0710 07/08/22  0905   PHOSPHORUS mg/dL 3.9 3.1 3.8             Results from last 7 days   Lab Units 07/10/22  0503 07/09/22  0711 07/08/22  0905 07/07/22  1014 07/06/22  1135   WBC 10*3/mm3 8.20 7.81 8.42 8.44 12.51*   HEMOGLOBIN g/dL 7.9* 8.1* 8.1* 7.8* 8.5*   PLATELETS 10*3/mm3 170 173 175 173 214             Assessment / Plan     ASSESSMENT:  1.  ESRD, secondary to diabetic and hypertensive glomerulosclerosis; usual HD schedule is MWF.  Peripheral more than central volume excess, better; last HD was 7/8.  Hypervolemic hyponatremia; stable potassium  2.  Intracerebral bleed and altered mental status, with steady improvement  3.  Infected TDC, s/p removal 5/1. Replaced. Concluded vancomycin with last dose given 5/26  4.  DM2    5.  Coronary artery disease  6.  Acute on chronic diastolic dysfunction  7.  Anemia of CKD, on long-acting ANDRAE as an outpatient. Trying to minimize transfusions unless Hgb less than 7. Iron panel in favor of iron deficiency anemia.      PLAN:  1.  HD again tomorrow (MW) with additional fluid removal  2.  Continue fluid restriction 1200 mL/day      Alejo Lange MD  07/10/22  11:05 EDT    [facility]  [phone]

## 2022-07-10 NOTE — PLAN OF CARE
Problem: Rehabilitation ([facility]) Plan of Care  Goal: Plan of Care Review  Recent Flowsheet Documentation  Taken 7/10/2022 0356 by Jannie Carrillo, RN  Plan of Care Reviewed With: patient  Outcome Evaluation: Pt is A&Ox4, can be forgetful at times, cooperative, pt eating better, daughter brought food for her, did medicate for pain at bedime and gave ambien per pt request, resting well so far tonight, continues to have rash on upper coccyx lower back area, but has no complaints of discomfort or itching, will continue to monitor.   Goal Outcome Evaluation:  Plan of Care Reviewed With: patient        Progress: improving  Outcome Evaluation: Pt is A&Ox4, can be forgetful at times, cooperative, pt eating better, daughter brought food for her, did medicate for pain at bedime and gave ambien per pt request, resting well so far tonight, continues to have rash on upper coccyx lower back area, but has no complaints of discomfort or itching, will continue to monitor.

## 2022-07-11 ENCOUNTER — APPOINTMENT (OUTPATIENT)
Dept: MRI IMAGING | Facility: HOSPITAL | Age: 57
End: 2022-07-11

## 2022-07-11 LAB
GLUCOSE BLDC GLUCOMTR-MCNC: 119 MG/DL (ref 70–130)
GLUCOSE BLDC GLUCOMTR-MCNC: 167 MG/DL (ref 70–130)
GLUCOSE BLDC GLUCOMTR-MCNC: 202 MG/DL (ref 70–130)
GLUCOSE BLDC GLUCOMTR-MCNC: 266 MG/DL (ref 70–130)

## 2022-07-11 PROCEDURE — 85025 COMPLETE CBC W/AUTO DIFF WBC: CPT | Performed by: PHYSICAL MEDICINE & REHABILITATION

## 2022-07-11 PROCEDURE — 97110 THERAPEUTIC EXERCISES: CPT

## 2022-07-11 PROCEDURE — 80076 HEPATIC FUNCTION PANEL: CPT | Performed by: PHYSICAL MEDICINE & REHABILITATION

## 2022-07-11 PROCEDURE — 97535 SELF CARE MNGMENT TRAINING: CPT

## 2022-07-11 PROCEDURE — 82550 ASSAY OF CK (CPK): CPT | Performed by: PHYSICAL MEDICINE & REHABILITATION

## 2022-07-11 PROCEDURE — 82962 GLUCOSE BLOOD TEST: CPT

## 2022-07-11 PROCEDURE — 84439 ASSAY OF FREE THYROXINE: CPT | Performed by: PHYSICAL MEDICINE & REHABILITATION

## 2022-07-11 PROCEDURE — 84100 ASSAY OF PHOSPHORUS: CPT | Performed by: INTERNAL MEDICINE

## 2022-07-11 PROCEDURE — 97530 THERAPEUTIC ACTIVITIES: CPT

## 2022-07-11 PROCEDURE — 73718 MRI LOWER EXTREMITY W/O DYE: CPT

## 2022-07-11 PROCEDURE — 84443 ASSAY THYROID STIM HORMONE: CPT | Performed by: PHYSICAL MEDICINE & REHABILITATION

## 2022-07-11 PROCEDURE — 63710000001 INSULIN LISPRO (HUMAN) PER 5 UNITS: Performed by: HOSPITALIST

## 2022-07-11 PROCEDURE — 80048 BASIC METABOLIC PNL TOTAL CA: CPT | Performed by: INTERNAL MEDICINE

## 2022-07-11 PROCEDURE — 25010000002 EPOETIN ALFA-EPBX 10000 UNIT/ML SOLUTION: Performed by: PHYSICAL MEDICINE & REHABILITATION

## 2022-07-11 RX ADMIN — MICONAZOLE NITRATE 1 APPLICATION: 2 CREAM TOPICAL at 08:15

## 2022-07-11 RX ADMIN — ASPIRIN 81 MG: 81 TABLET, COATED ORAL at 08:15

## 2022-07-11 RX ADMIN — CARVEDILOL 25 MG: 25 TABLET, FILM COATED ORAL at 08:14

## 2022-07-11 RX ADMIN — CLONIDINE HYDROCHLORIDE 0.2 MG: 0.1 TABLET ORAL at 08:15

## 2022-07-11 RX ADMIN — PANTOPRAZOLE SODIUM 40 MG: 40 TABLET, DELAYED RELEASE ORAL at 06:16

## 2022-07-11 RX ADMIN — HYDRALAZINE HYDROCHLORIDE 100 MG: 50 TABLET, FILM COATED ORAL at 06:16

## 2022-07-11 RX ADMIN — GABAPENTIN 300 MG: 300 CAPSULE ORAL at 08:15

## 2022-07-11 RX ADMIN — SODIUM ZIRCONIUM CYCLOSILICATE 5 G: 5 POWDER, FOR SUSPENSION ORAL at 08:14

## 2022-07-11 RX ADMIN — ACETAMINOPHEN 500 MG: 500 TABLET, FILM COATED ORAL at 08:30

## 2022-07-11 RX ADMIN — SERTRALINE 150 MG: 100 TABLET, FILM COATED ORAL at 08:14

## 2022-07-11 RX ADMIN — CLONIDINE HYDROCHLORIDE 0.2 MG: 0.1 TABLET ORAL at 22:18

## 2022-07-11 RX ADMIN — CARVEDILOL 25 MG: 25 TABLET, FILM COATED ORAL at 19:42

## 2022-07-11 RX ADMIN — TAMSULOSIN HYDROCHLORIDE 0.4 MG: 0.4 CAPSULE ORAL at 08:14

## 2022-07-11 RX ADMIN — HYDRALAZINE HYDROCHLORIDE 100 MG: 50 TABLET, FILM COATED ORAL at 22:18

## 2022-07-11 RX ADMIN — ROPINIROLE HYDROCHLORIDE 0.75 MG: 0.5 TABLET, FILM COATED ORAL at 22:18

## 2022-07-11 RX ADMIN — OXYCODONE 5 MG: 5 TABLET ORAL at 06:16

## 2022-07-11 RX ADMIN — ZOLPIDEM TARTRATE 2.5 MG: 5 TABLET ORAL at 22:19

## 2022-07-11 RX ADMIN — LOSARTAN POTASSIUM 100 MG: 100 TABLET, FILM COATED ORAL at 08:15

## 2022-07-11 RX ADMIN — EPOETIN ALFA-EPBX 10000 UNITS: 10000 INJECTION, SOLUTION INTRAVENOUS; SUBCUTANEOUS at 15:49

## 2022-07-11 RX ADMIN — INSULIN GLARGINE-YFGN 7 UNITS: 100 INJECTION, SOLUTION SUBCUTANEOUS at 08:16

## 2022-07-11 RX ADMIN — INSULIN LISPRO 2 UNITS: 100 INJECTION, SOLUTION INTRAVENOUS; SUBCUTANEOUS at 12:48

## 2022-07-11 RX ADMIN — Medication 1 TABLET: at 08:15

## 2022-07-11 RX ADMIN — GABAPENTIN 300 MG: 300 CAPSULE ORAL at 22:19

## 2022-07-11 RX ADMIN — OXYCODONE 5 MG: 5 TABLET ORAL at 19:37

## 2022-07-11 RX ADMIN — INSULIN LISPRO 4 UNITS: 100 INJECTION, SOLUTION INTRAVENOUS; SUBCUTANEOUS at 08:15

## 2022-07-11 RX ADMIN — LEVOTHYROXINE SODIUM 150 MCG: 0.15 TABLET ORAL at 06:16

## 2022-07-11 RX ADMIN — LIDOCAINE 1 PATCH: 50 PATCH CUTANEOUS at 08:15

## 2022-07-11 NOTE — THERAPY TREATMENT NOTE
Inpatient Rehabilitation - Physical Therapy Treatment Note       [facility]     Patient Name: Zaina Martinez  : 1965  MRN: [id number]    Today's Date: 2022                    Admit Date: 2022      Visit Dx:     ICD-10-CM ICD-9-CM   1. Generalized weakness  R53.1 780.79   2. Diabetic muscle infarction ([facility])  E11.69 250.80    M62.20 728.89   3. Other insomnia  G47.09 780.52       Patient Active Problem List   Diagnosis   • Renal insufficiency   • Hypertensive disorder   • Hypothyroidism   • Type 2 diabetes mellitus with kidney complication, with long-term current use of insulin ([facility])   • Rheumatoid arthritis ([facility])   • Angioedema   • Esophageal dysmotility   • Anemia   • Medically noncompliant   • Myocardial infarction due to demand ischemia ([facility])   • Enteritis   • PRES (posterior reversible encephalopathy syndrome)   • Urine retention   • Klebsiella infection   • Superficial thrombophlebitis   • Generalized weakness   • ESRD (end stage renal disease) ([facility])   • CAD (coronary artery disease)   • Abnormal urinalysis   • Chronic diastolic CHF (congestive heart failure) ([facility])   • Pyelonephritis   • Calculus of gallbladder with acute on chronic cholecystitis without obstruction   • Pleural effusion on right   • Anemia due to chronic kidney disease, on chronic dialysis ([facility])   • Abnormal findings on diagnostic imaging of other specified body structures   • Acute upper respiratory infection   • Agitation   • Alkaline phosphatase raised   • Casts present in urine   • Cellulitis of toe   • Hip pain   • Community acquired pneumonia   • Depressive disorder   • Diarrhea of presumed infectious origin   • Difficult or painful urination   • Disease due to severe acute respiratory syndrome coronavirus 2 (SARS-CoV-2)   • Dyspnea   • Encounter for follow-up examination after completed treatment for conditions other than malignant neoplasm   • H/O: hypothyroidism   • Hyperlipidemia   • Hypomagnesemia   • Intractable  vomiting with nausea   • Leukocytosis   • Luetscher's syndrome   • Need for influenza vaccination   • Restless legs   • Noncompliance with treatment   • Shoulder pain   • Urinary tract infectious disease   • Metabolic encephalopathy   • Abnormal findings on diagnostic imaging of abdomen   • Status post cholecystectomy   • Hyponatremia   • Leukocytosis   • Acute metabolic encephalopathy   • Encephalopathy, toxic   • Acute CVA (cerebrovascular accident) (HCC)   • Intracranial hemorrhage (HCC)   • Stroke (HCC)   • Abnormal urinalysis   • Diabetic muscle infarction (HCC)   • Other insomnia       Past Medical History:   Diagnosis Date   • Acute CVA (cerebrovascular accident) (HCC) 5/1/2022   • Acute on chronic diastolic CHF (congestive heart failure) (HCC)    • CAD (coronary artery disease) 12/20/2021   • Diabetes (HCC)    • Disease of thyroid gland    • GERD (gastroesophageal reflux disease)    • Hyperlipidemia 11/30/2018   • Hypertension    • Rheumatoid arthritis (HCC)        Past Surgical History:   Procedure Laterality Date   • CHOLECYSTECTOMY WITH INTRAOPERATIVE CHOLANGIOGRAM N/A 1/10/2022    Procedure: Laparoscopic cholecystectomy with intraoperative cholangiogram;  Surgeon: Ramana Raygoza MD;  Location: [facility];  Service: General;  Laterality: N/A;   • EYE SURGERY     • HYSTERECTOMY     • INSERTION HEMODIALYSIS CATHETER N/A 12/6/2021    Procedure: HEMODIALYSIS CATHETER INSERTION;  Surgeon: Keli Salazar MD;  Location: [facility] 18/19;  Service: Vascular;  Laterality: N/A;   • INSERTION HEMODIALYSIS CATHETER N/A 5/3/2022    Procedure: TUNNELED CATHETER PLACEMENT;  Surgeon: Keli Salazar MD;  Location: [facility];  Service: Vascular;  Laterality: N/A;   • MUSCLE BIOPSY Left 6/15/2022    Procedure: Left quadriceps muscle biopsy;  Surgeon: Marques Ma MD;  Location: [facility];  Service: Neurosurgery;  Laterality: Left;       PT ASSESSMENT (last 12 hours)     IRF PT  "Evaluation and Treatment     Row Name 07/11/22 1200 07/11/22 1039       PT Time and Intention    Document Type daily treatment  -AE daily treatment  -MS    Mode of Treatment individual therapy;physical therapy  -AE physical therapy  -MS    Patient/Family/Caregiver Comments/Observations 1st therapy session pt found supine in bed, incontinent of urine. emphasized bed mobility and transfer to wc  -AE Patient sitting up in wc upon PT arrival, NAD, exit alarm on, declined ambulation but agreeable to seated exercises and transfers  -MS    Total Minutes, Physical Therapy -- 30  -MS    Row Name 07/11/22 1200 07/11/22 1039       General Information    Patient Profile Reviewed yes  -AE --    General Observations of Patient patient states she feels \"okay\" today, began crying once placed in wc  -AE --    Existing Precautions/Restrictions -- fall;other (see comments)  Per Dr. Florentino, 'suspected diabetic muscle infarction left thigh, no strenuous strengthening exercises left quadriceps or left hamstrings'  -MS    Row Name 07/11/22 1200          Pain Assessment    Pretreatment Pain Rating 3/10  -AE     Posttreatment Pain Rating 8/10  -AE     Pain Location - Side/Orientation Left  -AE     Pain Location proximal  -AE     Pain Location - groin  -AE     Row Name 07/11/22 1200          Cognition/Psychosocial    Affect/Mental Status (Cognition) flat/blunted affect  -AE     Behavioral Issues (Cognition) other (see comments);overwhelmed easily  tearful once up in chair  -AE     Follows Commands (Cognition) follows one-step commands  -AE     Personal Safety Interventions fall prevention program maintained;gait belt;muscle strengthening facilitated;nonskid shoes/slippers when out of bed;supervised activity  -AE     Row Name 07/11/22 1200          Bed Mobility    Rolling Left [place] (Bed Mobility) contact guard  -AE     Rolling Right [place] (Bed Mobility) minimum assist (75% patient effort)  -AE     Supine-Sit [place] (Bed " Mobility) moderate assist (50% patient effort)  -AE     Comment, (Bed Mobility) flat bed no rails, required modA to advance legs to edge of bed, Ranulfo at trunk  rolling back and forth 3 x for brief management and lower body dressing  -AE     Row Name 07/11/22 1200          Transfers    Bed-Chair [place] (Transfers) minimum assist (75% patient effort);moderate assist (50% patient effort);1 person assist  -AE     Assistive Device (Bed-Chair Transfers) wheelchair  stand pivot pulling on arm rest  -AE     Row Name 07/11/22 1200          Positioning and Restraints    Pre-Treatment Position in bed  -AE     Post Treatment Position wheelchair  -AE     In Wheelchair sitting;call light within reach;encouraged to call for assist;exit alarm on;with nsg  -AE           User Key  (r) = Recorded By, (t) = Taken By, (c) = Cosigned By    Initials Name Provider Type    MS SantosTeagan, PT Physical Therapist    Keli Tejeda, PT Physical Therapist              Rash Left lower thoracic spine (Active)   Distribution localized 07/11/22 0827   Color pink 07/11/22 0827   Borders irregular 07/11/22 0827   Characteristics dry 07/11/22 0827   Care, Rash antimicrobial agent applied 07/11/22 0827       Wound 06/15/22 1312 Left anterior thigh Incision (Active)   Dressing Appearance open to air 07/11/22 0827   Closure Liquid skin adhesive 07/11/22 0827   Base dry;scab 07/11/22 0827   Periwound intact;dry 07/11/22 0827   Periwound Temperature warm 07/11/22 0827   Periwound Skin Turgor soft 07/11/22 0827   Drainage Amount none 07/11/22 0827   Dressing Care open to air 07/11/22 0827     Physical Therapy Education                 Title: PT OT SLP Therapies (In Progress)     Topic: Physical Therapy (In Progress)     Point: Mobility training (In Progress)     Learning Progress Summary           Patient Acceptance, E, NR by LB at 7/8/2022 1534   Significant Other Refuses, E, NR by KP at 7/4/2022 1245    Comment:  did not show for  teaching      Show all documentation for this point (36)                 Point: Home exercise program (In Progress)     Learning Progress Summary           Patient Acceptance, E, NR by LB at 7/8/2022 1534      Show all documentation for this point (12)                 Point: Body mechanics (Done)     Learning Progress Summary           Patient Acceptance, E,TB,D, VU,NR by JS at 6/25/2022 1434      Show all documentation for this point (8)                 Point: Precautions (Done)     Learning Progress Summary           Patient Acceptance, E,TB,D, VU,NR by JS at 6/25/2022 1434      Show all documentation for this point (8)                             User Key     Initials Effective Dates Name Provider Type Discipline     06/16/21 -  Fany Lima, PT Physical Therapist PT    LB 03/07/18 -  Liz Rodriguez, PTA Physical Therapist Assistant PT    KP 06/16/21 -  Alyx Whiting, PT Physical Therapist PT                PT Recommendation and Plan                          Time Calculation:      PT Charges     Row Name 07/11/22 1222 07/11/22 1157 07/11/22 1039       Time Calculation    Start Time 0800  -AE -- 1000  -MS    Stop Time 0830  -AE -- 1030  -MS    Time Calculation (min) 30 min  -AE -- 30 min  -MS    PT Received On 07/11/22  -AE -- 07/11/22  -MS    PT - Next Appointment 07/12/22  -AE 07/11/22  -MD 07/12/22  -MS       Time Calculation- PT    Total Timed Code Minutes- PT 30 minute(s)  -AE -- --          User Key  (r) = Recorded By, (t) = Taken By, (c) = Cosigned By    Initials Name Provider Type    Jeannette Peoples, PT Physical Therapist    Teagan Mckeon, PT Physical Therapist    AE Keli Camarillo, PT Physical Therapist                Therapy Charges for Today     Code Description Service Date Service Provider Modifiers Qty    20029168478 HC PT THERAPEUTIC ACT EA 15 MIN 7/11/2022 Keli Camarillo, PT GP 2            PT G-Codes  AM-PAC 6 Clicks Score (PT): 17      Keli Camarillo PT  7/11/2022

## 2022-07-11 NOTE — THERAPY TREATMENT NOTE
Inpatient Rehabilitation - Occupational Therapy Treatment Note    [facility]     Patient Name: Zaina Martinez  : 1965  MRN: [id number]    Today's Date: 2022                 Admit Date: 2022         ICD-10-CM ICD-9-CM   1. Generalized weakness  R53.1 780.79   2. Diabetic muscle infarction ([facility])  E11.69 250.80    M62.20 728.89   3. Other insomnia  G47.09 780.52       Patient Active Problem List   Diagnosis   • Renal insufficiency   • Hypertensive disorder   • Hypothyroidism   • Type 2 diabetes mellitus with kidney complication, with long-term current use of insulin ([facility])   • Rheumatoid arthritis ([facility])   • Angioedema   • Esophageal dysmotility   • Anemia   • Medically noncompliant   • Myocardial infarction due to demand ischemia ([facility])   • Enteritis   • PRES (posterior reversible encephalopathy syndrome)   • Urine retention   • Klebsiella infection   • Superficial thrombophlebitis   • Generalized weakness   • ESRD (end stage renal disease) ([facility])   • CAD (coronary artery disease)   • Abnormal urinalysis   • Chronic diastolic CHF (congestive heart failure) ([facility])   • Pyelonephritis   • Calculus of gallbladder with acute on chronic cholecystitis without obstruction   • Pleural effusion on right   • Anemia due to chronic kidney disease, on chronic dialysis ([facility])   • Abnormal findings on diagnostic imaging of other specified body structures   • Acute upper respiratory infection   • Agitation   • Alkaline phosphatase raised   • Casts present in urine   • Cellulitis of toe   • Hip pain   • Community acquired pneumonia   • Depressive disorder   • Diarrhea of presumed infectious origin   • Difficult or painful urination   • Disease due to severe acute respiratory syndrome coronavirus 2 (SARS-CoV-2)   • Dyspnea   • Encounter for follow-up examination after completed treatment for conditions other than malignant neoplasm   • H/O: hypothyroidism   • Hyperlipidemia   • Hypomagnesemia   • Intractable vomiting with  nausea   • Leukocytosis   • Luetscher's syndrome   • Need for influenza vaccination   • Restless legs   • Noncompliance with treatment   • Shoulder pain   • Urinary tract infectious disease   • Metabolic encephalopathy   • Abnormal findings on diagnostic imaging of abdomen   • Status post cholecystectomy   • Hyponatremia   • Leukocytosis   • Acute metabolic encephalopathy   • Encephalopathy, toxic   • Acute CVA (cerebrovascular accident) (HCC)   • Intracranial hemorrhage (HCC)   • Stroke (HCC)   • Abnormal urinalysis   • Diabetic muscle infarction (HCC)   • Other insomnia       Past Medical History:   Diagnosis Date   • Acute CVA (cerebrovascular accident) (HCC) 5/1/2022   • Acute on chronic diastolic CHF (congestive heart failure) (HCC)    • CAD (coronary artery disease) 12/20/2021   • Diabetes (HCC)    • Disease of thyroid gland    • GERD (gastroesophageal reflux disease)    • Hyperlipidemia 11/30/2018   • Hypertension    • Rheumatoid arthritis (HCC)        Past Surgical History:   Procedure Laterality Date   • CHOLECYSTECTOMY WITH INTRAOPERATIVE CHOLANGIOGRAM N/A 1/10/2022    Procedure: Laparoscopic cholecystectomy with intraoperative cholangiogram;  Surgeon: Ramana Raygoza MD;  Location[facility];  Service: General;  Laterality: N/A;   • EYE SURGERY     • HYSTERECTOMY     • INSERTION HEMODIALYSIS CATHETER N/A 12/6/2021    Procedure: HEMODIALYSIS CATHETER INSERTION;  Surgeon: Keli Salazar MD;  Location: [facility] 18/19;  Service: Vascular;  Laterality: N/A;   • INSERTION HEMODIALYSIS CATHETER N/A 5/3/2022    Procedure: TUNNELED CATHETER PLACEMENT;  Surgeon: Keli aSlazar MD;  Location: [facility] OR;  Service: Vascular;  Laterality: N/A;   • MUSCLE BIOPSY Left 6/15/2022    Procedure: Left quadriceps muscle biopsy;  Surgeon: Marques Ma MD;  Location: [facility] OR;  Service: Neurosurgery;  Laterality: Left;             IRF OT ASSESSMENT FLOWSHEET (last 12 hours)     IRF OT  Evaluation and Treatment     [facility] Name 07/11/22 1147          OT Time and Intention    Document Type daily treatment  -MW     Mode of Treatment occupational therapy  -MW     Patient Effort fair  -MW     Symptoms Noted During/After Treatment increased pain  LLE  -MW     Row Name 07/11/22 1147          General Information    Patient Profile Reviewed yes  -MW     Patient/Family/Caregiver Comments/Observations AM: pt sitting up in w/c in room, ice pack donned, NAD  -MW     Existing Precautions/Restrictions fall  -MW     Row Name 07/11/22 1147          Pain Assessment    Pretreatment Pain Rating 5/10  -MW     Posttreatment Pain Rating 5/10  -MW     Pain Location - Side/Orientation Left  -MW     Pain Location lower  -MW     Pain Location - extremity  -MW     [facility] Name 07/11/22 1147          Cognition/Psychosocial    Affect/Mental Status (Cognition) flat/blunted affect  -MW     Orientation Status (Cognition) oriented x 3  -MW     Personal Safety Interventions fall prevention program maintained;supervised activity;muscle strengthening facilitated;nonskid shoes/slippers when out of bed  -     Row Name 07/11/22 1147          Bathing    [place] Level (Bathing) bathing skills;upper body;set up;standby assist  -     Position (Bathing) supported sitting  -     Set-up Assistance (Bathing) obtain supplies  -     Comment (Bathing) w/c level  -[facility] Name 07/11/22 1147          Upper Body Dressing    Comment (Upper Body Dressing) UB/LB dressing completed prior to session in supine after brief change required per pt report  -[facility] Name 07/11/22 1147          Grooming    [place] Level (Grooming) grooming skills;deodorant application;hair care, combing/brushing;oral care regimen;wash face, hands;set up  -     Position (Grooming) supported sitting  -     Set-up Assistance (Grooming) obtain supplies  -     Comment (Grooming) w/c level  -MW     Row Name 07/11/22 1147          Toileting    Comment (Toileting)  completed prior to session in supine per pt report  -MW     Row Name 07/11/22 1147          Shoulder (Therapeutic Exercise)    Shoulder Strengthening (Therapeutic Exercise) bilateral;aBduction;aDduction;horizontal aBduction/aDduction;sitting;2 lb free weight;scapular stabilization;10 repetitions;3 sets  -MW     Row Name 07/11/22 1147          Elbow/Forearm (Therapeutic Exercise)    Elbow/Forearm Strengthening (Therapeutic Exercise) bilateral;flexion;extension;supination;pronation;sitting;2 lb free weight;10 repetitions;3 sets  -MW     Row Name 07/11/22 1147          Wrist (Therapeutic Exercise)    Wrist Strengthening (Therapeutic Exercise) flexion;extension;bilateral;2 lb free weight;10 repetitions;2 sets  -MW     Row Name 07/11/22 1147          Positioning and Restraints    Pre-Treatment Position sitting in chair/recliner  -MW     Post Treatment Position wheelchair  -MW     In Wheelchair notified nsg;sitting;call light within reach;encouraged to call for assist;exit alarm on  -MW           User Key  (r) = Recorded By, (t) = Taken By, (c) = Cosigned By    Initials Name Effective Dates    Claudine Vera OT 08/20/21 -                  Occupational Therapy Education                 Title: PT OT SLP Therapies (In Progress)     Topic: Occupational Therapy (Done)     Point: ADL training (Done)     Description:   Instruct learner(s) on proper safety adaptation and remediation techniques during self care or transfers.   Instruct in proper use of assistive devices.              Learning Progress Summary           Patient Acceptance, E,TB,D, VU,NR by JS at 6/25/2022 1434      Show all documentation for this point (2)                 Point: Home exercise program (Done)     Description:   Instruct learner(s) on appropriate technique for monitoring, assisting and/or progressing therapeutic exercises/activities.              Learning Progress Summary           Patient Acceptance, E,D,H, VU by AF at 6/30/2022 7713     Comment: review of HEP with hand weights and theraband      Show all documentation for this point (2)                 Point: Precautions (Done)     Description:   Instruct learner(s) on prescribed precautions during self-care and functional transfers.              Learning Progress Summary           Patient Acceptance, E,TB,D, VU,NR by  at 6/25/2022 1434      Show all documentation for this point (2)                 Point: Body mechanics (Done)     Description:   Instruct learner(s) on proper positioning and spine alignment during self-care, functional mobility activities and/or exercises.              Learning Progress Summary           Patient Acceptance, E,TB,D, VU,NR by  at 6/25/2022 1434      Show all documentation for this point (2)                             User Key     Initials Effective Dates Name Provider Type Discipline     06/16/21 -  Fany Lima, PT Physical Therapist PT    AF 06/16/21 -  Tasha Helm OTR Occupational Therapist OT                    OT Recommendation and Plan                         Time Calculation:      Time Calculation- OT     Row Name 07/11/22 0830             Time Calculation- OT    OT Start Time 0830  -MW      OT Stop Time 0930  -MW      OT Time Calculation (min) 60 min  -MW            User Key  (r) = Recorded By, (t) = Taken By, (c) = Cosigned By    Initials Name Provider Type     Claudine Abbott OT Occupational Therapist              Therapy Charges for Today     Code Description Service Date Service Provider Modifiers Qty    93496973698 HC OT SELF CARE/MGMT/TRAIN EA 15 MIN 7/11/2022 Claudine Abbott OT GO 2    00920745102 HC OT THER PROC EA 15 MIN 7/11/2022 Claudine Abbott OT GO 2                   Claudine Abbott OT  7/11/2022

## 2022-07-11 NOTE — PROGRESS NOTES
Inpatient Rehabilitation Plan of Care Note    Plan of Care  Updated Problems/Interventions  Mobility    [PT] Bed/Chair/Wheelchair(Active)  Current Status(07/11/2022): Ranulfo. Up to min/modA  Weekly Goal(07/18/2022): CGA  Discharge Goal: CGA    [PT] Bed Mobility(Active)  Current Status(07/11/2022): modA  Weekly Goal(07/18/2022): Ranulfo  Discharge Goal: Ranulfo    [PT] Car Transfers(Active)  Current Status(07/11/2022): CGA, RW (on 7/2)  Weekly Goal(07/18/2022): PT only  Discharge Goal: [place]    [PT] Walk(Active)  Current Status(07/11/2022): 60' CGA-Ranulfo, RW  Weekly Goal(07/18/2022): to BR, RW, CGA/SBA  Discharge Goal: 160', AAD,SBA/CGA    [PT] Stairs(Active)  Current Status(07/11/2022): 4, raedison, Ranulfo (on 7/2)  Weekly Goal(07/18/2022): PT only  Discharge Goal: 4, 1 rail, Ranulfo    Signed by: Teagan Santos, PT

## 2022-07-11 NOTE — PROGRESS NOTES
Inpatient Rehabilitation Functional Measures Assessment and Plan of Care    Plan of Care  Updated Problems/Interventions  Mobility    [OT] Toilet Transfers(Active)  Current Status(07/11/2022): MIN  Weekly Goal(07/13/2022): CGA  Discharge Goal: CGA    [OT] Tub/Shower Transfers(Active)  Current Status(07/11/2022): min A  Weekly Goal(07/13/2022): CGA  Discharge Goal: CGA        Self Care    [OT] Bathing(Active)  Current Status(07/11/2022): UB-SBA, LB-MIN/MOD  Weekly Goal(07/13/2022): MIn  Discharge Goal: MIN    [OT] Dressing (Lower)(Active)  Current Status(07/11/2022): Mod/MAX  Weekly Goal(07/13/2022): MOD  Discharge Goal: MOD    [OT] Dressing (Upper)(Active)  Current Status(07/11/2022): set up  Weekly Goal(07/13/2022): SBA  Discharge Goal: SBA    [OT] Grooming(Active)  Current Status(07/11/2022): set up  Weekly Goal(07/13/2022): SBA  Discharge Goal: SBA    [OT] Toileting(Active)  Current Status(07/11/2022): MIN/MOD  Weekly Goal(07/13/2022): MIN  Discharge Goal: MIN    Functional Measures  JEREMI Eating:  Branch  JEREMI Grooming: Branch  JEREMI Bathing:  Branch  JEREMI Upper Body Dressing:  Branch  JEREMI Lower Body Dressing:  Branch  JEREMI Toileting:  Branch    JEREMI Bladder Management  Level of Assistance:  Branch  Frequency/Number of Accidents this Shift:  Branch    JEREMI Bowel Management  Level of Assistance: Branch  Frequency/Number of Accidents this Shift: Branch    JEREMI Bed/Chair/Wheelchair Transfer:  Branch  JEREMI Toilet Transfer:  Branch  JEREMI Tub/Shower Transfer:  Branch    Previously Documented Mode of Locomotion at Discharge: Field  JEREMI Expected Mode of Locomotion at Discharge: Branch  JEREMI Walk/Wheelchair:  Branch  JEREMI Stairs:  Branch    JEREMI Comprehension:  Branch  JEREMI Expression:  Branch  JEREMI Social Interaction:  Branch  JEREMI Problem Solving:  Branch  JEREMI Memory:  Branch    Therapy Mode Minutes  Occupational Therapy: Branch  Physical Therapy: Branch  Speech Language Pathology:  Branch    Signed by: Tasah Helm OT

## 2022-07-11 NOTE — NURSING NOTE
Dialysis complete, removed 3.5 liters with this treatment and no complications noted. Vital signs are recorded in epic.

## 2022-07-11 NOTE — THERAPY TREATMENT NOTE
Inpatient Rehabilitation - Physical Therapy Treatment Note       [facility]     Patient Name: Zaina Martinez  : 1965  MRN: [id number]    Today's Date: 2022                    Admit Date: 2022      Visit Dx:     ICD-10-CM ICD-9-CM   1. Generalized weakness  R53.1 780.79   2. Diabetic muscle infarction ([facility])  E11.69 250.80    M62.20 728.89   3. Other insomnia  G47.09 780.52       Patient Active Problem List   Diagnosis   • Renal insufficiency   • Hypertensive disorder   • Hypothyroidism   • Type 2 diabetes mellitus with kidney complication, with long-term current use of insulin ([facility])   • Rheumatoid arthritis ([facility])   • Angioedema   • Esophageal dysmotility   • Anemia   • Medically noncompliant   • Myocardial infarction due to demand ischemia ([facility])   • Enteritis   • PRES (posterior reversible encephalopathy syndrome)   • Urine retention   • Klebsiella infection   • Superficial thrombophlebitis   • Generalized weakness   • ESRD (end stage renal disease) ([facility])   • CAD (coronary artery disease)   • Abnormal urinalysis   • Chronic diastolic CHF (congestive heart failure) ([facility])   • Pyelonephritis   • Calculus of gallbladder with acute on chronic cholecystitis without obstruction   • Pleural effusion on right   • Anemia due to chronic kidney disease, on chronic dialysis ([facility])   • Abnormal findings on diagnostic imaging of other specified body structures   • Acute upper respiratory infection   • Agitation   • Alkaline phosphatase raised   • Casts present in urine   • Cellulitis of toe   • Hip pain   • Community acquired pneumonia   • Depressive disorder   • Diarrhea of presumed infectious origin   • Difficult or painful urination   • Disease due to severe acute respiratory syndrome coronavirus 2 (SARS-CoV-2)   • Dyspnea   • Encounter for follow-up examination after completed treatment for conditions other than malignant neoplasm   • H/O: hypothyroidism   • Hyperlipidemia   • Hypomagnesemia   • Intractable  vomiting with nausea   • Leukocytosis   • Luetscher's syndrome   • Need for influenza vaccination   • Restless legs   • Noncompliance with treatment   • Shoulder pain   • Urinary tract infectious disease   • Metabolic encephalopathy   • Abnormal findings on diagnostic imaging of abdomen   • Status post cholecystectomy   • Hyponatremia   • Leukocytosis   • Acute metabolic encephalopathy   • Encephalopathy, toxic   • Acute CVA (cerebrovascular accident) (HCC)   • Intracranial hemorrhage (HCC)   • Stroke (HCC)   • Abnormal urinalysis   • Diabetic muscle infarction (HCC)   • Other insomnia       Past Medical History:   Diagnosis Date   • Acute CVA (cerebrovascular accident) (HCC) 5/1/2022   • Acute on chronic diastolic CHF (congestive heart failure) (HCC)    • CAD (coronary artery disease) 12/20/2021   • Diabetes (HCC)    • Disease of thyroid gland    • GERD (gastroesophageal reflux disease)    • Hyperlipidemia 11/30/2018   • Hypertension    • Rheumatoid arthritis (HCC)        Past Surgical History:   Procedure Laterality Date   • CHOLECYSTECTOMY WITH INTRAOPERATIVE CHOLANGIOGRAM N/A 1/10/2022    Procedure: Laparoscopic cholecystectomy with intraoperative cholangiogram;  Surgeon: Ramana Raygoza MD;  Location: [facility];  Service: General;  Laterality: N/A;   • EYE SURGERY     • HYSTERECTOMY     • INSERTION HEMODIALYSIS CATHETER N/A 12/6/2021    Procedure: HEMODIALYSIS CATHETER INSERTION;  Surgeon: Keli Salazar MD;  Location: [facility] 18/19;  Service: Vascular;  Laterality: N/A;   • INSERTION HEMODIALYSIS CATHETER N/A 5/3/2022    Procedure: TUNNELED CATHETER PLACEMENT;  Surgeon: Keli Salazar MD;  Location: [facility];  Service: Vascular;  Laterality: N/A;   • MUSCLE BIOPSY Left 6/15/2022    Procedure: Left quadriceps muscle biopsy;  Surgeon: Marques Ma MD;  Location: [facility];  Service: Neurosurgery;  Laterality: Left;       PT ASSESSMENT (last 12 hours)     IRF PT  "Evaluation and Treatment     Row Name 07/11/22 1200 07/11/22 1039       PT Time and Intention    Document Type daily treatment  -AE daily treatment  -MS    Mode of Treatment individual therapy;physical therapy  -AE physical therapy  -MS    Patient/Family/Caregiver Comments/Observations 1st therapy session pt found supine in bed, incontinent of urine. emphasized bed mobility and transfer to wc  -AE AM: Patient sitting up in wc upon PT arrival, NAD, exit alarm on, declined ambulation but agreeable to seated exercises and transfers; PM: Patient sitting up in wc upon PT arrival, reporting 7/10 pain  -MS    Total Minutes, Physical Therapy -- 60  -MS    Row Name 07/11/22 1200 07/11/22 1039       General Information    Patient Profile Reviewed yes  -AE --    General Observations of Patient patient states she feels \"okay\" today, began crying once placed in wc  -AE --    Existing Precautions/Restrictions -- fall;other (see comments)  Per Dr. Florentino, 'suspected diabetic muscle infarction left thigh, no strenuous strengthening exercises left quadriceps or left hamstrings'  -MS    Row Name 07/11/22 1200 07/11/22 1039       Pain Assessment    Pretreatment Pain Rating 3/10  -AE 7/10  -MS    Posttreatment Pain Rating 8/10  -AE 7/10  -MS    Pain Location - Side/Orientation Left  -AE Left  -MS    Pain Location proximal  -AE --    Pain Location - groin  -AE knee  -MS    Row Name 07/11/22 1039          Pain Scale: Word Pre/Post-Treatment    Pain Intervention(s) Repositioned;Rest  -MS     Row Name 07/11/22 1200 07/11/22 1039       Cognition/Psychosocial    Affect/Mental Status (Cognition) flat/blunted affect  -AE flat/blunted affect  -MS    Behavioral Issues (Cognition) other (see comments);overwhelmed easily  tearful once up in chair  -AE --    Orientation Status (Cognition) -- oriented x 4  -MS    Follows Commands (Cognition) follows one-step commands  -AE follows two-step commands;repetition of directions required;verbal " cues/prompting required  -MS    Personal Safety Interventions fall prevention program maintained;gait belt;muscle strengthening facilitated;nonskid shoes/slippers when out of bed;supervised activity  -AE fall prevention program maintained;gait belt;nonskid shoes/slippers when out of bed  -MS    Row Name 07/11/22 1200          Bed Mobility    Rolling Left [place] (Bed Mobility) contact guard  -AE     Rolling Right [place] (Bed Mobility) minimum assist (75% patient effort)  -AE     Supine-Sit [place] (Bed Mobility) moderate assist (50% patient effort)  -AE     Comment, (Bed Mobility) flat bed no rails, required modA to advance legs to edge of bed, Ranulfo at trunk  rolling back and forth 3 x for brief management and lower body dressing  -AE     Row Name 07/11/22 1200 07/11/22 1039       Transfers    Bed-Chair [place] (Transfers) minimum assist (75% patient effort);moderate assist (50% patient effort);1 person assist  -AE --    Chair-Bed [place] (Transfers) -- minimum assist (75% patient effort);verbal cues;nonverbal cues (demo/gesture)  -MS    Assistive Device (Bed-Chair Transfers) wheelchair  stand pivot pulling on arm rest  -AE --    Sit-Stand [place] (Transfers) -- contact guard;minimum assist (75% patient effort);verbal cues  -MS    Stand-Sit [place] (Transfers) -- contact guard;minimum assist (75% patient effort);verbal cues  -MS    Row Name 07/11/22 1039          Chair-Bed Transfer    Assistive Device (Chair-Bed Transfers) walker, front-wheeled  -MS     Comment, (Chair-Bed Transfer) wc<>elevated bed  -MS     Row Name 07/11/22 1039          Sit-Stand Transfer    Assistive Device (Sit-Stand Transfers) walker, front-wheeled  -MS     Row Name 07/11/22 1039          Stand-Sit Transfer    Assistive Device (Stand-Sit Transfers) walker, front-wheeled  -MS     Row Name 07/11/22 1039          Gait/Stairs (Locomotion)    [place] Level (Gait) contact guard;minimum assist (75% patient  effort);verbal cues  -MS     Assistive Device (Gait) walker, front-wheeled  Second person following with wc  -MS     Distance in Feet (Gait) 60'  -MS     Pattern (Gait) step-to;step-through  -MS     Deviations/Abnormal Patterns (Gait) antalgic;gait speed decreased;kenn decreased;stride length decreased;weight shifting decreased  -MS     Bilateral Gait Deviations heel strike decreased;forward flexed posture  -MS     Row Name 07/11/22 1039          Balance    Static Sitting Balance independent  -MS     Dynamic Sitting Balance independent  -MS     Position, Sitting Balance unsupported;sitting in chair  -MS     Static Standing Balance contact guard  -MS     Dynamic Standing Balance contact guard  -MS     Position/Device Used, Standing Balance supported;walker, front-wheeled  -MS     Comment, Balance static standing with RW for UE support- 3 mins total, 2 STS from wheelchair- up to 75s needed to complete.  -MS     Row Name 07/11/22 1039          Knee (Therapeutic Exercise)    Knee Strengthening (Therapeutic Exercise) bilateral;sitting;LAQ (long arc quad);10 repetitions;2 sets  -MS     Row Name 07/11/22 1200 07/11/22 1039       Positioning and Restraints    Pre-Treatment Position in bed  -AE sitting in chair/recliner  -MS    Post Treatment Position wheelchair  -AE wheelchair  -MS    In Wheelchair sitting;call light within reach;encouraged to call for assist;exit alarm on;with nsg  -AE sitting;exit alarm on;call light within reach;encouraged to call for assist  -MS          User Key  (r) = Recorded By, (t) = Taken By, (c) = Cosigned By    Initials Name Provider Type    MS Teagan Santos, PT Physical Therapist    AE Keli Camarillo, PT Physical Therapist              Rash Left lower thoracic spine (Active)   Distribution localized 07/11/22 0827   Color pink 07/11/22 0827   Borders irregular 07/11/22 0827   Characteristics dry 07/11/22 0827   Care, Rash antimicrobial agent applied 07/11/22 0827       Wound 06/15/22  1312 Left anterior thigh Incision (Active)   Dressing Appearance open to air 07/11/22 0827   Closure Liquid skin adhesive 07/11/22 0827   Base dry;scab 07/11/22 0827   Periwound intact;dry 07/11/22 0827   Periwound Temperature warm 07/11/22 0827   Periwound Skin Turgor soft 07/11/22 0827   Drainage Amount none 07/11/22 0827   Dressing Care open to air 07/11/22 0827     Physical Therapy Education                 Title: PT OT SLP Therapies (In Progress)     Topic: Physical Therapy (In Progress)     Point: Mobility training (In Progress)     Learning Progress Summary           Patient Acceptance, E,TB, NR by MS at 7/11/2022 1434   Significant Other Refuses, E, NR by  at 7/4/2022 1245    Comment:  did not show for teaching      Show all documentation for this point (37)                 Point: Home exercise program (In Progress)     Learning Progress Summary           Patient Acceptance, E,TB, NR by MS at 7/11/2022 1434      Show all documentation for this point (13)                 Point: Body mechanics (Done)     Learning Progress Summary           Patient Acceptance, E,TB,D, VU,NR by  at 6/25/2022 1434      Show all documentation for this point (8)                 Point: Precautions (Done)     Learning Progress Summary           Patient Acceptance, E,TB,D, VU,NR by  at 6/25/2022 1434      Show all documentation for this point (8)                             User Key     Initials Effective Dates Name Provider Type Discipline     06/16/21 -  Fany Lima, PT Physical Therapist PT     06/16/21 -  Alyx Whiting, PT Physical Therapist PT    MS 06/16/21 -  Teagan Santos PT Physical Therapist PT                PT Recommendation and Plan                          Time Calculation:      PT Charges     Row Name 07/11/22 1409 07/11/22 1349 07/11/22 1222       Time Calculation    Start Time 1000  -MS 1330  -MS 0800  -AE    Stop Time 1030  -MS 1400  -MS 0830  -AE    Time Calculation (min) 30 min  -MS 30 min   -MS 30 min  -AE    PT Received On -- 07/11/22  -MS 07/11/22  -AE    PT - Next Appointment -- 07/12/22  -MS 07/12/22  -AE       Time Calculation- PT    Total Timed Code Minutes- PT -- -- 30 minute(s)  -AE    Row Name 07/11/22 1157 07/11/22 1039          Time Calculation    Start Time -- 1000  -MS     Stop Time -- 1030  -MS     Time Calculation (min) -- 30 min  -MS     PT Received On -- 07/11/22  -MS     PT - Next Appointment 07/11/22  -MD 07/12/22  -MS           User Key  (r) = Recorded By, (t) = Taken By, (c) = Cosigned By    Initials Name Provider Type    Jeannette Peoples, PT Physical Therapist    MS TomTeagan, PT Physical Therapist    Keli Tejeda, PT Physical Therapist                Therapy Charges for Today     Code Description Service Date Service Provider Modifiers Qty    06508806751 HC PT THERAPEUTIC ACT EA 15 MIN 7/11/2022 Teagan Santos, PT GP 1    29501101042 HC PT THER PROC EA 15 MIN 7/11/2022 Teagan Santos, PT GP 1    34137068519 HC PT THER PROC EA 15 MIN 7/11/2022 Teagan Santos, PT GP 1    13196315768 HC PT THERAPEUTIC ACT EA 15 MIN 7/11/2022 Teagan Santos, PT GP 1            PT G-Codes  AM-PAC 6 Clicks Score (PT): 17      Teagan Santos, PT  7/11/2022

## 2022-07-11 NOTE — THERAPY TREATMENT NOTE
Inpatient Rehabilitation - Occupational Therapy Treatment Note    [facility]     Patient Name: Zaina Martinez  : 1965  MRN: [id number]    Today's Date: 2022                 Admit Date: 2022         ICD-10-CM ICD-9-CM   1. Generalized weakness  R53.1 780.79   2. Diabetic muscle infarction ([facility])  E11.69 250.80    M62.20 728.89   3. Other insomnia  G47.09 780.52       Patient Active Problem List   Diagnosis   • Renal insufficiency   • Hypertensive disorder   • Hypothyroidism   • Type 2 diabetes mellitus with kidney complication, with long-term current use of insulin ([facility])   • Rheumatoid arthritis ([facility])   • Angioedema   • Esophageal dysmotility   • Anemia   • Medically noncompliant   • Myocardial infarction due to demand ischemia ([facility])   • Enteritis   • PRES (posterior reversible encephalopathy syndrome)   • Urine retention   • Klebsiella infection   • Superficial thrombophlebitis   • Generalized weakness   • ESRD (end stage renal disease) ([facility])   • CAD (coronary artery disease)   • Abnormal urinalysis   • Chronic diastolic CHF (congestive heart failure) ([facility])   • Pyelonephritis   • Calculus of gallbladder with acute on chronic cholecystitis without obstruction   • Pleural effusion on right   • Anemia due to chronic kidney disease, on chronic dialysis ([facility])   • Abnormal findings on diagnostic imaging of other specified body structures   • Acute upper respiratory infection   • Agitation   • Alkaline phosphatase raised   • Casts present in urine   • Cellulitis of toe   • Hip pain   • Community acquired pneumonia   • Depressive disorder   • Diarrhea of presumed infectious origin   • Difficult or painful urination   • Disease due to severe acute respiratory syndrome coronavirus 2 (SARS-CoV-2)   • Dyspnea   • Encounter for follow-up examination after completed treatment for conditions other than malignant neoplasm   • H/O: hypothyroidism   • Hyperlipidemia   • Hypomagnesemia   • Intractable vomiting with  nausea   • Leukocytosis   • Luetscher's syndrome   • Need for influenza vaccination   • Restless legs   • Noncompliance with treatment   • Shoulder pain   • Urinary tract infectious disease   • Metabolic encephalopathy   • Abnormal findings on diagnostic imaging of abdomen   • Status post cholecystectomy   • Hyponatremia   • Leukocytosis   • Acute metabolic encephalopathy   • Encephalopathy, toxic   • Acute CVA (cerebrovascular accident) (HCC)   • Intracranial hemorrhage (HCC)   • Stroke (HCC)   • Abnormal urinalysis   • Diabetic muscle infarction (HCC)   • Other insomnia       Past Medical History:   Diagnosis Date   • Acute CVA (cerebrovascular accident) (HCC) 5/1/2022   • Acute on chronic diastolic CHF (congestive heart failure) (HCC)    • CAD (coronary artery disease) 12/20/2021   • Diabetes (HCC)    • Disease of thyroid gland    • GERD (gastroesophageal reflux disease)    • Hyperlipidemia 11/30/2018   • Hypertension    • Rheumatoid arthritis (HCC)        Past Surgical History:   Procedure Laterality Date   • CHOLECYSTECTOMY WITH INTRAOPERATIVE CHOLANGIOGRAM N/A 1/10/2022    Procedure: Laparoscopic cholecystectomy with intraoperative cholangiogram;  Surgeon: Ramana Raygoza MD;  Location: [facility];  Service: General;  Laterality: N/A;   • EYE SURGERY     • HYSTERECTOMY     • INSERTION HEMODIALYSIS CATHETER N/A 12/6/2021    Procedure: HEMODIALYSIS CATHETER INSERTION;  Surgeon: Keli Salazar MD;  Location: [facility] 18/19;  Service: Vascular;  Laterality: N/A;   • INSERTION HEMODIALYSIS CATHETER N/A 5/3/2022    Procedure: TUNNELED CATHETER PLACEMENT;  Surgeon: Keli Salazar MD;  Location: [facility];  Service: Vascular;  Laterality: N/A;   • MUSCLE BIOPSY Left 6/15/2022    Procedure: Left quadriceps muscle biopsy;  Surgeon: Marques Ma MD;  Location: [facility];  Service: Neurosurgery;  Laterality: Left;             IRF OT ASSESSMENT FLOWSHEET (last 12 hours)     IRF OT  Evaluation and Treatment     Row Name 07/11/22 1527 07/11/22 1147       OT Time and Intention    Document Type daily treatment  -AF daily treatment  -MW    Mode of Treatment occupational therapy  -AF occupational therapy  -MW    Patient Effort good  -AF fair  -MW    Symptoms Noted During/After Treatment -- increased pain  LLE  -MW    Row Name 07/11/22 1527 07/11/22 1147       General Information    Patient Profile Reviewed -- yes  -MW    Patient/Family/Caregiver Comments/Observations pt sitting up in w/c in room agreeable to OT session  -AF AM: pt sitting up in w/c in room, ice pack donned, NAD  -MW    Existing Precautions/Restrictions fall  contact precautions  -AF fall  -MW    Row Name 07/11/22 1527 07/11/22 1147       Pain Assessment    Pretreatment Pain Rating 3/10  -AF 5/10  -MW    Posttreatment Pain Rating 4/10  -AF 5/10  -MW    Pain Location - Side/Orientation Left  -AF Left  -MW    Pain Location -- lower  -MW    Pain Location - groin  -AF extremity  -MW    Row Name 07/11/22 1527 07/11/22 1147       Cognition/Psychosocial    Affect/Mental Status (Cognition) flat/blunted affect  -AF flat/blunted affect  -MW    Orientation Status (Cognition) oriented x 3  -AF oriented x 3  -MW    Follows Commands (Cognition) follows one-step commands  -AF --    Personal Safety Interventions fall prevention program maintained;gait belt;nonskid shoes/slippers when out of bed  -AF fall prevention program maintained;supervised activity;muscle strengthening facilitated;nonskid shoes/slippers when out of bed  -MW    Row Name 07/11/22 1147          Bathing    [place] Level (Bathing) bathing skills;upper body;set up;standby assist  -MW     Position (Bathing) supported sitting  -MW     Set-up Assistance (Bathing) obtain supplies  -MW     Comment (Bathing) w/c level  -MW     Row Name 07/11/22 1147          Upper Body Dressing    Comment (Upper Body Dressing) UB/LB dressing completed prior to session in supine after brief change  required per pt report  -MW     Row Name 07/11/22 1147          Grooming    [place] Level (Grooming) grooming skills;deodorant application;hair care, combing/brushing;oral care regimen;wash face, hands;set up  -MW     Position (Grooming) supported sitting  -MW     Set-up Assistance (Grooming) obtain supplies  -     Comment (Grooming) w/c level  -MW     Row Name 07/11/22 1147          Toileting    Comment (Toileting) completed prior to session in supine per pt report  -MW     Row Name 07/11/22 1527 07/11/22 1147       Shoulder (Therapeutic Exercise)    Shoulder Strengthening (Therapeutic Exercise) bilateral;scapular stabilization;horizontal aBduction/aDduction;resisted diagonal exercise;sitting;resistance band;yellow;10 repetitions;2 sets;3 sets  -AF bilateral;aBduction;aDduction;horizontal aBduction/aDduction;sitting;2 lb free weight;scapular stabilization;10 repetitions;3 sets  -MW    Row Name 07/11/22 1527 07/11/22 1147       Elbow/Forearm (Therapeutic Exercise)    Elbow/Forearm Strengthening (Therapeutic Exercise) bilateral;extension;resistance band;yellow;10 repetitions;2 sets  -AF bilateral;flexion;extension;supination;pronation;sitting;2 lb free weight;10 repetitions;3 sets  -MW    [facility] Name 07/11/22 1147          Wrist (Therapeutic Exercise)    Wrist Strengthening (Therapeutic Exercise) flexion;extension;bilateral;2 lb free weight;10 repetitions;2 sets  -MW     [facility] Name 07/11/22 1527          Hand (Therapeutic Exercise)    Hand Strengthening (Therapeutic Exercise) bilateral; strengthening;hand gripper;10 repetitions;2 sets  -AF     [facility] Name 07/11/22 1527 07/11/22 1147       Positioning and Restraints    Pre-Treatment Position sitting in chair/recliner  -AF sitting in chair/recliner  -MW    Post Treatment Position wheelchair  -AF wheelchair  -MW    In Wheelchair sitting;exit alarm on;with PT  -AF notified nsg;sitting;call light within reach;encouraged to call for assist;exit alarm on  -MW           User Key  (r) = Recorded By, (t) = Taken By, (c) = Cosigned By    Initials Name Effective Dates    AF Tasha Helm OTJANICE 06/16/21 -     Claudine Vera OT 08/20/21 -                  Occupational Therapy Education                 Title: PT OT SLP Therapies (In Progress)     Topic: Occupational Therapy (Done)     Point: ADL training (Done)     Description:   Instruct learner(s) on proper safety adaptation and remediation techniques during self care or transfers.   Instruct in proper use of assistive devices.              Learning Progress Summary           Patient Acceptance, E,TB,D, VU,NR by  at 6/25/2022 1434      Show all documentation for this point (2)                 Point: Home exercise program (Done)     Description:   Instruct learner(s) on appropriate technique for monitoring, assisting and/or progressing therapeutic exercises/activities.              Learning Progress Summary           Patient Acceptance, E,D,H, VU by  at 6/30/2022 1544    Comment: review of HEP with hand weights and theraband      Show all documentation for this point (2)                 Point: Precautions (Done)     Description:   Instruct learner(s) on prescribed precautions during self-care and functional transfers.              Learning Progress Summary           Patient Acceptance, E,TB,D, VU,NR by  at 6/25/2022 1434      Show all documentation for this point (2)                 Point: Body mechanics (Done)     Description:   Instruct learner(s) on proper positioning and spine alignment during self-care, functional mobility activities and/or exercises.              Learning Progress Summary           Patient Acceptance, E,TB,D, VU,NR by  at 6/25/2022 1434      Show all documentation for this point (2)                             User Key     Initials Effective Dates Name Provider Type Discipline     06/16/21 -  Fany Lima PT Physical Therapist PT     06/16/21 -  Tasha Helm, OTR Occupational Therapist OT                     OT Recommendation and Plan                         Time Calculation:      Time Calculation- OT     Row Name 07/11/22 1530 07/11/22 0830          Time Calculation- OT    OT Start Time 1300  -AF 0830  -MW     OT Stop Time 1330  -AF 0930  -MW     OT Time Calculation (min) 30 min  -AF 60 min  -MW           User Key  (r) = Recorded By, (t) = Taken By, (c) = Cosigned By    Initials Name Provider Type    AF Tasha Helm, OTR Occupational Therapist     Claudine Abbott OT Occupational Therapist              Therapy Charges for Today     Code Description Service Date Service Provider Modifiers Qty    94161764150 HC OT THER PROC EA 15 MIN 7/11/2022 Tasha Helm OTJANICE GO 2                   RAMO Mike  7/11/2022

## 2022-07-11 NOTE — PROGRESS NOTES
Case Management  Inpatient Rehabilitation Plan of Care and Discharge Plan Note    Rehabilitation Diagnosis:  Branch  Date of Onset:  Branch    Medical Summary:  Branch  Past Medical History: Branch    Plan of Care  Updated Problems/Interventions  Field    Expected Intensity:  Branch  Interdisciplinary Team:  Branch  Estimated Length of Stay/Anticipated Discharge Date: Branch  Anticipated Discharge Destination:  Anticipated discharge destination from inpatient rehabilitation is community  discharge with assistance. Discharge plan is home with family when medically  ready to discharge.  [facility] following      Based on the patient's medical and functional status, their prognosis and  expected level of functional improvement is:  Branch    Signed by: STACY Fragoso

## 2022-07-11 NOTE — PLAN OF CARE
Goal Outcome Evaluation:  Plan of Care Reviewed With: patient        Progress: improving   Patient is calm and cooperative. Alert and oriented. Forgetful. PRN tylenol given prior therapy this morning. LLE elevated and ice pack provided. She went to dialysis today after therapy. Swelling to bilateral lower extremity. 1 person assist with transfers. Incontinent of urine this morning. Miconazole cream to rash at lower back applied. Rash is pink. Blood sugar was 266 this morning and 167 at lunchtime. Insulin per sliding scale administered. She is using the call light for assistance. Will continue to monitor.

## 2022-07-11 NOTE — PROGRESS NOTES
[facility] Progress Note      Patient Name: Zaina Martinez  : 1965  MRN: [id number]  Primary Care Physician:  Karson Oreilly MD  Date of admission: 2022    Subjective     Interval History:   Follow up ESRD    No acute events overnight.    CT scan of the abdomen:  1. Mediastinal and supraclavicular nodes are stable in size. Left  axillary nodes may have increased.  2. Scattered groundglass infiltrates within both lungs. These are new  when compared to prior exam. Both edema and infiltrates would be in the  differential.  3. Worsening left-sided hydroureteronephrosis. Appearance may be related  to stenosis of the distal ureter. However, consideration for  cystoscopy/ureteroscopy versus CT urogram is again suggested.  4. Overall thick-walled appearance to the urinary bladder. Correlation  with urinalysis and urine cultures is recommended.  5. Retroperitoneal adenopathy has increased. Given some potential  increasing adenopathy, consideration for PET is suggested.      Review of Systems:   Denies chest pain or shortness of breath    Objective     Vitals:   Temp:  [97.7 °F (36.5 °C)-98 °F (36.7 °C)] 98 °F (36.7 °C)  Heart Rate:  [54-59] 55  Resp:  [14-17] 17  BP: (138-171)/(62-80) 138/80  No intake or output data in the 24 hours ending 22 0748    Physical Exam:   General: Awake, oriented, NAD, chronically ill  HEENT: AT/NC; periorbital edema better  Neck: RIJ TDC  Lungs: Diminished in bases, not labored on room air  Heart: RRR no s3 or rub. 2/6 systolic murmur  Abdomen: +bs, soft, not distended  Extremities: 1-2+ pitting edema bilateral extremities.  Neuro:  Moves all 4 ext.     Scheduled Meds:     aspirin, 81 mg, Oral, Daily  b complex-vitamin c-folic acid, 1 tablet, Oral, Daily  carvedilol, 25 mg, Oral, BID With Meals  cloNIDine, 0.2 mg, Oral, Q12H  epoetin brooke/brooke-epbx, 10,000 Units, Intravenous, Once per day on   gabapentin, 100 mg, Oral,  Q24H  gabapentin, 300 mg, Oral, BID  hydrALAZINE, 100 mg, Oral, Q8H  insulin glargine, 7 Units, Subcutaneous, QAM  insulin lispro, 0-7 Units, Subcutaneous, TID AC  levothyroxine, 150 mcg, Oral, Q AM  lidocaine, 1 patch, Transdermal, Q24H  losartan, 100 mg, Oral, Q24H  miconazole, 1 application, Topical, Q12H  pantoprazole, 40 mg, Oral, Q AM  rOPINIRole, 0.75 mg, Oral, Nightly  sertraline, 150 mg, Oral, Daily  sodium zirconium cyclosilicate, 5 g, Oral, Daily  tamsulosin, 0.4 mg, Oral, Daily      IV Meds:        Results Reviewed:   I have personally reviewed the results from the time of this admission to 7/11/2022 07:48 EDT     Results from last 7 days   Lab Units 07/10/22  0503 07/09/22  0710 07/08/22  0905   SODIUM mmol/L 133* 131* 134*   POTASSIUM mmol/L 4.4 3.8 4.2   CHLORIDE mmol/L 96* 96* 97*   CO2 mmol/L 24.0 24.6 24.9   BUN mg/dL 34* 22* 21*   CREATININE mg/dL 3.17* 2.17* 2.21*   CALCIUM mg/dL 7.8* 7.6* 7.7*   BILIRUBIN mg/dL 0.4 0.5 0.6   ALK PHOS U/L 572* 597* 627*   ALT (SGPT) U/L 18 21 24   AST (SGOT) U/L 32 35* 42*   GLUCOSE mg/dL 156* 167* 161*       Estimated Creatinine Clearance: 18.9 mL/min (A) (by C-G formula based on SCr of 3.17 mg/dL (H)).    Results from last 7 days   Lab Units 07/10/22  0503 07/09/22  0710 07/08/22  0905   PHOSPHORUS mg/dL 3.9 3.1 3.8             Results from last 7 days   Lab Units 07/10/22  0503 07/09/22  0711 07/08/22  0905 07/07/22  1014 07/06/22  1135   WBC 10*3/mm3 8.20 7.81 8.42 8.44 12.51*   HEMOGLOBIN g/dL 7.9* 8.1* 8.1* 7.8* 8.5*   PLATELETS 10*3/mm3 170 173 175 173 214             Assessment / Plan     ASSESSMENT:  1.  ESRD, secondary to diabetic and hypertensive glomerulosclerosis; usual HD schedule is [facility].   last HD was 7/8.  Hypervolemic hyponatremia; stable potassium  2.  Intracerebral bleed and altered mental status, with steady improvement  3.  Infected TDC, s/p removal 5/1. Replaced. Concluded vancomycin with last dose given 5/26  4.  DM2    5.    Worsening  left-sided hydronephrosis on CT scan  6.  Acute on chronic diastolic dysfunction  7.  Anemia of CKD, on long-acting ANDRAE as an outpatient. Trying to minimize transfusions unless Hgb less than 7. Iron panel in favor of iron deficiency anemia.      PLAN:  1.  Hemodialysis Monday Wednesday Friday.  2.  Consult urology      Anil Arauz MD  07/11/22  07:48 EDT    [facility]  [phone]

## 2022-07-11 NOTE — PROGRESS NOTES
CC: Debility    SUBJECTIVE:      Patient had updated imaging studies over the weekend.  She has appointment with rheumatology scheduled for later this week.  She continues with complaints of pain at the left thigh.  Denies any pain at the hip or at the knee, no pain in the lower leg.  Variable tolerance of activities.  Did work on some gait activities with physical therapy this afternoon  Continues on oxycodone for pain.  Reports continues to sleep better on low-dose Ambien 2.5mg.  Has not had any recent diarrhea.  She continues with hydroureter on the left side on follow-up CT scan-intermittent straight cath volumes have not been over about 400 cc over the past month.  Urology re-consulted for updated assessment           OBJECTIVE:  Vitals:    07/11/22 1257   BP: 155/70   Pulse: 52   Resp: 18   Temp: 98.6 °F (37 °C)   SpO2: 95%       GENERAL: NAD  MENTAL STATUS: Awake alert  HEENT:  NCAT  CARDIOVASCULAR: Sinus rhythm    CHEST:  hemodialysis access right chest  RESPIRATORY: Normal respirations.  Clear to auscultation without wheezes rales or rhonchi  ABDOMEN: Active bowel sounds, soft, nontender.     EXTREMITIES: Left thigh edema .  Edema at the left thigh appears similar..  Also has some edema in the left lower leg as well as right lower leg   Has diffuse tenderness to palpation about the left thigh.  No tenderness of the left calf    No myoclonus.  NEUROLOGIC:    Motor testing-takes resistance in the bilateral upper extremities and right lower extremity with left ankle dorsiflexion.  At least antigravity left knee extension but not fully tested secondary to pain inhibition      Scheduled Meds:aspirin, 81 mg, Oral, Daily  b complex-vitamin c-folic acid, 1 tablet, Oral, Daily  carvedilol, 25 mg, Oral, BID With Meals  cloNIDine, 0.2 mg, Oral, Q12H  epoetin brooke/brooke-epbx, 10,000 Units, Intravenous, Once per day on Mon Wed Fri  gabapentin, 100 mg, Oral, Q24H  gabapentin, 300 mg, Oral, BID  hydrALAZINE, 100 mg, Oral,  Q8H  insulin glargine, 7 Units, Subcutaneous, QAM  insulin lispro, 0-7 Units, Subcutaneous, TID AC  levothyroxine, 150 mcg, Oral, Q AM  lidocaine, 1 patch, Transdermal, Q24H  losartan, 100 mg, Oral, Q24H  miconazole, 1 application, Topical, Q12H  pantoprazole, 40 mg, Oral, Q AM  rOPINIRole, 0.75 mg, Oral, Nightly  sertraline, 150 mg, Oral, Daily  sodium zirconium cyclosilicate, 5 g, Oral, Daily  tamsulosin, 0.4 mg, Oral, Daily      Continuous Infusions:   PRN Meds:.•  acetaminophen  •  dextrose  •  dextrose  •  diphenoxylate-atropine  •  glucagon (human recombinant)  •  heparin (porcine)  •  hydrALAZINE  •  nitroglycerin  •  oxyCODONE  •  oxyCODONE  •  sodium chloride  •  zolpidem    Glucose   Date/Time Value Ref Range Status   07/11/2022 1213 167 (H) 70 - 130 mg/dL Final     Comment:     Meter: [id number] : [id number] Anup Martin NA   07/11/2022 0636 266 (H) 70 - 130 mg/dL Final     Comment:     Meter: [id number] : [id number] Catrachita Elayne PEDRITO RN   07/10/2022 2159 172 (H) 70 - 130 mg/dL Final     Comment:     Meter: [id number] : [id number] Lorena Valderrama RN   07/10/2022 1621 134 (H) 70 - 130 mg/dL Final     Comment:     Meter: [id number] : [id number] Carlton SAAVEDRA   07/10/2022 1153 184 (H) 70 - 130 mg/dL Final     Comment:     Meter: [id number] : [id number] Carlton Gutierrezlaide NA   07/10/2022 0623 163 (H) 70 - 130 mg/dL Final     Comment:     Meter: [id number] : [id number] Jennie Gilliam [facility]   07/09/2022 2102 126 70 - 130 mg/dL Final     Comment:     Meter: [id number] : [id number] Jennie Gilliam [facility]   07/09/2022 1629 242 (H) 70 - 130 mg/dL Final     Comment:     Meter: [id number] : [id number] Carlton SAAVEDRA     Results from last 7 days   Lab Units 07/10/22  0503 07/09/22  0711 07/08/22  0905   WBC 10*3/mm3 8.20 7.81 8.42   HEMOGLOBIN g/dL 7.9* 8.1* 8.1*   HEMATOCRIT % 24.9* 25.8* 26.2*   PLATELETS 10*3/mm3 170 173 175     Results from last 7 days   Lab Units 07/10/22  0503  07/09/22  0710 07/08/22  0905   SODIUM mmol/L 133* 131* 134*   POTASSIUM mmol/L 4.4 3.8 4.2   CHLORIDE mmol/L 96* 96* 97*   CO2 mmol/L 24.0 24.6 24.9   BUN mg/dL 34* 22* 21*   CREATININE mg/dL 3.17* 2.17* 2.21*   CALCIUM mg/dL 7.8* 7.6* 7.7*   BILIRUBIN mg/dL 0.4 0.5 0.6   ALK PHOS U/L 572* 597* 627*   ALT (SGPT) U/L 18 21 24   AST (SGOT) U/L 32 35* 42*   GLUCOSE mg/dL 156* 167* 161*      Latest Reference Range & Units 05/27/22 11:30   Creatine Kinase 20 - 180 U/L 94   Aldolase 3.3 - 10.3 U/L 5.6       Imaging Results (Last 24 Hours)     Procedure Component Value Units Date/Time    MRI Femur Left Without Contrast [[id number]] Collected: 07/11/22 1239     Updated: 07/11/22 1428    Narrative:      MRI LEFT FEMUR WITHOUT CONTRAST     HISTORY: Left hip and thigh pain.     TECHNIQUE: MRI left femur includes axial T1, STIR as well as coronal T1,  STIR and sagittal PD fat-saturated sequences.     COMPARISON: CT abdomen and pelvis 07/10/2022. MRI pelvis and left femur  on 06/03/2022, CT left lower extremity 05/25/2022.     FINDINGS: There is progressive T2 hyperintense signal associated with  the musculature of the left thigh when compared to the prior exam  06/03/2022. There is developed increased T2 signal rather diffusely  within the hamstring, quadriceps, adductor muscular groups. Progression  of T2 hyperintense signal is best demonstrated involving the quadriceps  musculature. There are segments of the mid and distal vastus medialis  oblique muscle where there is fluid signal and minimal visualized muscle  fibers. There is partial sparing of segments of the rectus femoris  muscle.     There is also right thigh muscular edema best demonstrated involving the  right adductor musculature, right vastus medialis, intermedius, vastus  lateralis oblique muscles.     Extensive third spacing of fluid is present about the pelvis and both  femora. The femoral bone marrow signal appears normal and there is no  fracture. There is no  compelling evidence for septic arthritis in either  hip or the knee.       Impression:      1. Progressive edema and fluid replacement of left thigh musculature  with coalescent edema greatest within the mid to distal vastus medialis  oblique muscle though involving all muscles/muscle groups of the left  thigh. Findings suggest advanced myositis with potential areas of  myonecrosis or pyomyonecrosis and this may be associated with  rhabdomyolysis.  2. Progressive muscle edema involving the right thigh musculature  greatest within the mid and distal vastus medialis and lateralis oblique  muscles.  3. Third spacing of fluid with diffuse edema subcutaneous fat about the  pelvis and both thighs.  4. No evidence for left femoral fracture or bone marrow signal  abnormality.     This report was finalized on 7/11/2022 2:25 PM by Dr. Aguila Pulido M.D.       CT Chest Without Contrast Diagnostic [[id number]] Collected: 07/11/22 0318     Updated: 07/11/22 0348    Narrative:      CT OF THE CHEST WITHOUT CONTRAST; CT OF THE ABDOMEN AND PELVIS WITHOUT  CONTRAST     HISTORY: Adenopathy     COMPARISON: 06/08/2022     TECHNIQUE: Axial CT imaging was obtained from the thoracic inlet through  the symphysis pubis. No IV contrast was administered.     FINDINGS:  CT CHEST: Right internal jugular vein tunneled dialysis catheter extends  into the right atrium. The heart is enlarged. Hyperdense appearance to  the interventricular septum can be seen in the setting of anemia. There  is trace pericardial fluid. There are small bilateral pleural effusions,  left greater than right. Left pleural effusion particular appears  smaller. The thyroid gland, trachea, and esophagus appear unremarkable.  Prominent supraclavicular lymph nodes are again noted. For example, one  on the right measures up to 9 mm, unchanged when compared to prior exam.  Subcarinal lymph node is stable in size at 1.2 cm. Scattered groundglass  infiltrates are seen within  the lungs bilaterally. Appearance is  nonspecific. Some of this may represent edema, although correlation with  any evidence of infection is recommended. Areas of atelectasis are also  noted at the lung bases. Appearance of T12 is stable when compared to  the prior exam. No new osseous lesions are seen. There are enlarged  axillary nodes. For example, a right axillary lymph node measures 1.2 cm  in short axis dimensions. Previously, it measured 1.1 cm. Left axillary  nodes have increased in size. For example, a left axillary node measures  up to 1.1 cm. Previously, it measured approximately 8 mm. There is body  wall edema.     CT OF THE ABDOMEN AND PELVIS: No suspicious hepatic lesions are seen.  The stomach and duodenum are normal. Adrenal glands are stable in  appearance pancreas appears grossly normal. Gallbladder is surgically  absent. There is left-sided hydroureteronephrosis. This has worsened  when compared to prior exam. Exact etiology is uncertain. It may be  secondary to a distal ureteral stricture. There are prominent  retroperitoneal lymph nodes. For example, a left para-aortic lymph node  measures 1.4 cm in short axis dimensions, previously measuring 1.8.  There is calcification of the aorta. Urinary bladder is thick-walled.  Correlation with urinalysis and urine cultures is recommended. It does  appear distended. There is presacral edema and a small amount of free  fluid within the pelvis. Large volume of stool is noted throughout the  colon, suggesting constipation. There are dense vascular calcifications.  Prior study demonstrated asymmetric edema involving the left thigh.  Patient is noted to have edema within both thighs on the current study.  There is asymmetric thickening of the left medial thigh musculature.  This does appear improved when compared to prior exam. Enlarged left  inguinal node measures 1.2 cm in short axis dimensions. It is probably  stable in size at 1.2 cm. No acute osseous  abnormalities are seen within  the pelvis.       Impression:         1. Mediastinal and supraclavicular nodes are stable in size. Left  axillary nodes may have increased.  2. Scattered groundglass infiltrates within both lungs. These are new  when compared to prior exam. Both edema and infiltrates would be in the  differential.  3. Worsening left-sided hydroureteronephrosis. Appearance may be related  to stenosis of the distal ureter. However, consideration for  cystoscopy/ureteroscopy versus CT urogram is again suggested.  4. Overall thick-walled appearance to the urinary bladder. Correlation  with urinalysis and urine cultures is recommended.  5. Retroperitoneal adenopathy has increased. Given some potential  increasing adenopathy, consideration for PET is suggested.     Radiation dose reduction techniques were utilized, including automated  exposure control and exposure modulation based on body size.     This report was finalized on 7/11/2022 3:45 AM by Dr. Cherri Rodriguez M.D.       CT Abdomen Pelvis Stone Protocol [[id number]] Collected: 07/11/22 0318     Updated: 07/11/22 0348    Narrative:      CT OF THE CHEST WITHOUT CONTRAST; CT OF THE ABDOMEN AND PELVIS WITHOUT  CONTRAST     HISTORY: Adenopathy     COMPARISON: 06/08/2022     TECHNIQUE: Axial CT imaging was obtained from the thoracic inlet through  the symphysis pubis. No IV contrast was administered.     FINDINGS:  CT CHEST: Right internal jugular vein tunneled dialysis catheter extends  into the right atrium. The heart is enlarged. Hyperdense appearance to  the interventricular septum can be seen in the setting of anemia. There  is trace pericardial fluid. There are small bilateral pleural effusions,  left greater than right. Left pleural effusion particular appears  smaller. The thyroid gland, trachea, and esophagus appear unremarkable.  Prominent supraclavicular lymph nodes are again noted. For example, one  on the right measures up to 9 mm, unchanged  when compared to prior exam.  Subcarinal lymph node is stable in size at 1.2 cm. Scattered groundglass  infiltrates are seen within the lungs bilaterally. Appearance is  nonspecific. Some of this may represent edema, although correlation with  any evidence of infection is recommended. Areas of atelectasis are also  noted at the lung bases. Appearance of T12 is stable when compared to  the prior exam. No new osseous lesions are seen. There are enlarged  axillary nodes. For example, a right axillary lymph node measures 1.2 cm  in short axis dimensions. Previously, it measured 1.1 cm. Left axillary  nodes have increased in size. For example, a left axillary node measures  up to 1.1 cm. Previously, it measured approximately 8 mm. There is body  wall edema.     CT OF THE ABDOMEN AND PELVIS: No suspicious hepatic lesions are seen.  The stomach and duodenum are normal. Adrenal glands are stable in  appearance pancreas appears grossly normal. Gallbladder is surgically  absent. There is left-sided hydroureteronephrosis. This has worsened  when compared to prior exam. Exact etiology is uncertain. It may be  secondary to a distal ureteral stricture. There are prominent  retroperitoneal lymph nodes. For example, a left para-aortic lymph node  measures 1.4 cm in short axis dimensions, previously measuring 1.8.  There is calcification of the aorta. Urinary bladder is thick-walled.  Correlation with urinalysis and urine cultures is recommended. It does  appear distended. There is presacral edema and a small amount of free  fluid within the pelvis. Large volume of stool is noted throughout the  colon, suggesting constipation. There are dense vascular calcifications.  Prior study demonstrated asymmetric edema involving the left thigh.  Patient is noted to have edema within both thighs on the current study.  There is asymmetric thickening of the left medial thigh musculature.  This does appear improved when compared to prior exam.  Enlarged left  inguinal node measures 1.2 cm in short axis dimensions. It is probably  stable in size at 1.2 cm. No acute osseous abnormalities are seen within  the pelvis.       Impression:         1. Mediastinal and supraclavicular nodes are stable in size. Left  axillary nodes may have increased.  2. Scattered groundglass infiltrates within both lungs. These are new  when compared to prior exam. Both edema and infiltrates would be in the  differential.  3. Worsening left-sided hydroureteronephrosis. Appearance may be related  to stenosis of the distal ureter. However, consideration for  cystoscopy/ureteroscopy versus CT urogram is again suggested.  4. Overall thick-walled appearance to the urinary bladder. Correlation  with urinalysis and urine cultures is recommended.  5. Retroperitoneal adenopathy has increased. Given some potential  increasing adenopathy, consideration for PET is suggested.     Radiation dose reduction techniques were utilized, including automated  exposure control and exposure modulation based on body size.     This report was finalized on 7/11/2022 3:45 AM by Dr. Cherri Rodriguez M.D.             ASSESSMENT AND PLAN    # R MCA s/p TPA with subsequent ICH with debility  Initially held antiplatelet/anticoagulation.  Reviewed with neurology on May 20 and resume aspirin 81 mg daily  SBP goal <160  Recommend outpatient Neurology fu - repeat MRI in 3 months        # DM  June 27-hyperglycemia on steroids.  Lantus increased to 30 units twice daily, Humalog 5 units 3 times daily with meals, and all increase sliding scale coverage  June 28 -hypoglycemic after increasing insulin dosing.  Blood glucose up to 98 prior to lunch, will hold sliding scale insulin and give 5 units scheduled with meal  June 30-hypoglycemia-Lantus twice daily decreased from 35 to 20 units.  Scheduled Humalog with meals discontinued  July 1-prednisone has been discontinued.  Reviewed with internal medicine and Lantus decreased  to 10 units twice daily and on discharge home to resume her home regimen of Lantus 10 units daily.  She will check her sugar tonight at home and if elevated give Lantus 10 units tonight and then continue with the Lantus 10 units daily tomorrow.  Reviewed with patient and her  that her sugars may be elevated initially as she just completed prednisone.  They were instructed to call for any additional instructions on adjustment in regimen if it remains elevated at home this weekend  July 6-Lantus 7 units daily  July 7-blood sugar range 566-535- yesterday,  this AM     # ESRD   Nephrology following  Inpatient HD    Flomax  Hyperkalemia  July 5-patient had hemodialysis on July 3 and July 4 for hyperkalemia.  Lokelma resumed by nephrology  July 7-had hemodialysis yesterday and has plans for hemodialysis again today  July 8-hemodialysis again today     Infectious disease-   # s/p catheter infection with MRSA  Vancomycin IV with stop date of May 26  May 24-vancomycin random equal 12.90-on vancomycin 500 mg IV on [id number]  May 26-to complete course of vancomycin today  #Urinary tract infection-on Dana 3 urinalysis was negative for infection but noted to have leukocytosis increase and repeat urinalysis on June 6 shows findings consistent with urinary tract infection.  Placed on a course of cefdinir renal dose 3 mg daily for 7 days.  No costophrenic angle tenderness patient reviewed with infectious disease service.  Leukocytosis felt related to the bladder infection and not previous catheter infection.  June 8-urine culture results pending.  On cefdinir.  Culture with E. coli, sensitivity pending  June 9 - E coli sensitive to cephalosporins.  June 21 - continues with leukocytosis WBC  15K today. May be from inflammatory process. Steroids added yesterday.   June 22-urine described as milky characteristic, different from previous-recheck urinalysis with culture if indicated  June 23-Labs are  still pending today.  Urinalysis also pending as she does not make much urine.  She had a temperature of 100.2 last evening, afebrile this morning.  June 27 - abnormal UA but urine culture from June 24 no growth  July 1-leukocytosis felt related to the noninfectious process in the left thigh as well as secondary to steroids  July 5-WBC trending down to 15.6 today  July 6-WBC trending down to 12 K  July 7-WBC 8.4     #left hydroureteronephrosis-Dana 3-CT scan shows left hydro ureter nephrosis.  She had some previous dilation of the ureter on comparison the past studies but increased today.  Bladder noted to be markedly distended.  Will do in and out cath now and daily to decompress bladder.  Addendum-intermittent cath for 600 cc.  June 4-once daily intermittent cath 450 cc.  June 5-seen by urology-Nguyễn catheter placed with plans to recheck hydroureter on renal ultrasound in 3 to 5 days.  June 6-Nguyễn catheter placed yesterday by urology, with only 170 cc out so far.  Patient reports did not note any change in her left groin pain after the in and out cath with decompression of the bladder..  June 8-reviewed with urology service-request noncontrast CT scan stone protocol to evaluate for resolution of the left hydronephrosis with urology planning to see tomorrow to review the films and then make recommendations, urology would recommend leaving the Nguyễn catheter in for the time being.  June 9 - improved left hydroureter on CT , Nguyễn discontinued.   July 1-to follow-up with urology as an outpatient with follow-up CT planned in August.  She occasionally required intermittent straight cath but this was very infrequent.  Home health nursing to follow.  Continues on Flomax  July 7-intermittent straight cath 400 cc  July 11-She continues with hydroureteronephrosis on the left side on follow-up CT scan-intermittent straight cath volumes have not been over about 400 cc over the past month.  Urology re-consulted for updated  assessment         #Anemia-  EPO.  June 6-hemoglobin 7.8  June 21- hemoglobin 8.3  June 27 - hemoglobin 8.7  June 28 - hemoglobin 8.8  July 6-hemoglobin 8.5     Lymphadenopathy-evaluated by hematology oncology while here.  No significant change from scans earlier this year.  She is to repeat a scan in 3 months and follow-up with hematology oncology  July 1109-[id number] CT of the abdomen pelvis shows lymphadenopathy overall about the same per her report.  To have follow-up with hematology oncology and repeat scan in another 2 months or so     #Elevated alkaline phosphatase  June 6-elevated alkaline phosphatase 770, up from 289 one week ago, 140 one month ago. Similar elevation in March, Feb even higher at 1,330 ( intra-abdominal fluid collection, underwent CT-guided aspiration  Revealed blood and cultures did not reveal any growth and therefore antibiotics  discontinued.  Surgery also did evaluate and signed off.), and elevated during admission in January ( She was seen by general surgery who recommended and performed laparoscopic cholecystectomy during that admission).   ALT 70, AST 87, Total bilirubin 0.8. Ca 8.3.  ? If liver source or bone or due to an inflammatory response.  June 7-GGT also elevated.  Seen by gastroenterology.  [place] biliary source.  Liver ultrasound ordered  June 8-liver ultrasound was unremarkable  June 21 - patient declined liver biopsy.   June 27-gastroenterology following peripherally-plans to follow-up as an outpatient and review option of liver biopsy again if alkaline phosphatase remains elevated  June 28-remains elevated at 503  June 30-alkaline phosphatase lower at 426  July 6-trend back up to 634  July 8-alkaline phosphatase unchanged 627     #Pain - neuropathy BL lower extremity/left thigh pain  Gabapentin/oxycodone.    June 7-patient with lethargy this morning.  May be due to urinary tract infection but with recent increase and gabapentin and oxycodone, will change gabapentin to 200 mg  twice a day and decrease oxycodone from 5 back down to 2.5 mg p.o. every 4 hours as needed  June 8-better speed with her processing today  June 21 - pain med regimen recently adjusted with tramadol 25 mg q 4 hours prn, gabapentin 300 g bid, lidoderm patch, oxycodone 2.5 mg q 6 hours prn. Prednisone 60 mg daily added which may help with pain from inflammatory process. Reports pain better today and participated better in therapies.   June 27 - continue present regimen  July 1-home on oxycodone 5 mg p.o. every 4 hours.  Pain dispense #40, gabapentin 300 mg twice daily, Tylenol 500 mg every 6 hours as needed  July 5-increase gabapentin slightly 300-100-300 mg.  Watch for any myoclonic twitching  July 8-had some lethargy earlier today, patient feels that she is tolerating oxycodone and gabapentin.  Was alert when seen on rounds.        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  Seen by infectious disease service with no acute infectious process noted.  Evaluated by hematology regarding lymphadenopathy, lymph node felt too small to biopsy.  Patient was on a course of low-dose prednisone 10 mg for 3 days then 5 mg for 2 days and then another 10 mg dose with out any significant improvement in the swelling or pain.  She has a history of rheumatoid arthritis.  See CT of the left thigh and MRI of the left thigh and pelvis reports   With lumbar radiculoplexitis and diabetic amyotrophy, on review of the literature do not see edema that she presents with associated with the findings or MRI changes in the tendon and  musculature with edema.  There is descriptions of MRI changes in the plexus and peripheral nerve.  In terms of treatment options for diabetic amyotrophy , there have not been definitive clinical trials.  Immunomodulation with steroids has been inconclusive as well as other immunomodulation therapy.  Reviewed with the patient  that  feel that she would benefit from seeing her rheumatologist as an outpatient to evaluate this further, as there does appear to be inflammatory cause given the lymphadenopathy and edema.  Rheumatologist does not come to the hospital.  CPK x2 has been normal.  Aldolase has been normal.  May need to look at biopsy of left thigh muscle to assess further.   June 8-patient reviewed with neurology yesterday who will assess and also provide input regarding whether muscle biopsy may be helpful.  June 9 - Discussed with Neurology and Rheumatology - plan is for EMG and then muscle biopsy.   Dana 15 - Left quadricep muscle biopsy was completed 6/15, results pending.  Labs: CKs have been normal, ANCA panel 6/11 was unremarkable  June 21 - started on full treatment dose of Prednisone 60 mg daily yesterday pending path results. Reports pain better so far today. Specimen sent to [facility]. Clinical impression presently focal inflammatory myositis pending final pathology results.   June 22-pain continues better today.  June 23-outside pathology report still pending  June 24 - Patient reports left thigh pain better over past couple days but still present. Does not have the soreness at medial thigh as before. Complains of pain at mid-thigh. No change in swelling. Does have discomfort with proximal movement in LLE. Walked 320 feet CTG SBA RW today.   Pain improved on steroid. Discontinued atorvastatin. Biopsy results returned from [facility] - see report -Type 2 fiber atrophy, advanced. Denervation/reinnervation. No evidence of inflammatory myopathy or vasculitis. [place]  Pathology lab pursuing a dystrophy panel and will report findings in an addendum.  Reviewed with Neurology - as shown symptomatic response, continue Prednisone 60 mg daily for about one more week, then taper off over month.   June 27 - continue present regimen  June 28-increased pain medication regimen to oxycodone 5 mg every 4 hours as needed for severe pain.   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 Treatment would be aspirin which she is already on. Discussed with Neurology and as been on Prednisone 60 mg for only 1.5 week, will stop and not do taper. Discussed with Internal Medicine who will adjust insulin.  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  Reviewed with the patient that treatment for this is aspirin which she is already on.  She may do walking activities but would avoid strenuous activities with left quadricep strengthening.  She may strengthen the other 3 extremities as tolerated.  Reviewed may take 3 months to resolve.  July 7-continues with left thigh pain.  She is noted to have increased edema in both lower extremities but particularly the left thigh compared to recent.  Lokelma has been associated with fluid retention which may explain increase edema in part.  Continues on aspirin for suspected diabetic muscular infarct.  No update report yet on additional studies for muscle biopsy.  Initial report was June 24 with additional studies planned  July 11-updated MRI imaging of the left thigh this past weekend.  See report.  She has appointment with rheumatology for further assessment later this week. -will recheck CK level for updated baseline as about 4 weeks out now from her biopsy which could have affected level.  Previous CK levels were not elevated.  White blood cell count has normalized which would hold against any active infection.   Edema at the left thigh appears about the same.  Continues with pain on the left side with pain inhibition with proximal muscles.    # HTN   Coreg  Clonidine  Hydralazine  Losartan  Nephrology/internal medicine following     #Hypothyroidism  Levothyroxine  June 9 - recheck TSH- addendum Dana 10- On Nov 30, 2021 , on Dec 2, 2021 T4 = 0.90, on Dec 9, T4=1.68. On May 7, TSH = 134. Has been on Levothyroxine 125 mcg/day it appears since at least Dec 13, 2021. See Dr Templeton's discharge note from Dec 17,2021 which discusses thyroid labs/dosing at that time. On June 9, 2022 TSH = 54.4.   Check free T4. Will get evaluation from Internal Medicine regarding thyroid studies/levothyroxine dosing.  June 11 - levothyroxine increased to 150mcg daily-continue this dose and she will need follow up TSH in 4-6 weeks from June 11 as an outpatient  July 11-repeat TSH and T4 ordered for Monday, July 11     #CAD  ASA held in setting of ICH - restart aspirin 81 mg daily on May 20, 2022 per Neurology     #Depression   Sertraline  June 28-sertraline dose increased  July 5-reviewed with psychiatry service-continue present regimen    Insomnia-improved on Ambien 5 mg  July 6-fatigue during the day-decrease Ambien 2.5 mg nightly and assess response  July 7-tolerated Ambien 2.5    #GERD   PPI     #Restless leg syndrome  Requip     #DVT prophylaxis-SCDs ordered but patient refusing.  Per neurology note May 11-continue off anticoagulation/antiplatelet for now. Restarted ASA 81 mg on May 20.  May 16-reviewed with patient and try venous foot compression devices  May 20-also not able to tolerate venous foot compression devices.  May 25-bilateral lower extremity venous duplex negative for DVT  July 9-  venous duplex negative for DVT left lower extremity    Diarrhea-July 7-loose stool x5 over the last day.  Lokelma is not associated with diarrhea.  C. difficile was negative on July 5.  Was on cefdinir from June 7 to June 13 and cefazolin one-time  dose on Dana 15.  - will recheck C. Difficile  July 11-C. difficile was negative x2.  Diarrhea resolved        -  comprehensive inpatient rehabilitation program working with PT, OT, SLP for a minimum of three hours per day, five days per week. - will monitor for progress     TEAM CONF - MAY 17- BED SBA. TRANSFER MIN. 4 STAIRS MIN. GAIT 160 FEET CTG RW. SLOW TO PROCESS. BATH MIN. LBD MIN. UBD MIN. GROOMING SET UP. TOILETING MIN . COGNITION OVERALL MILD DEFICITS. VISUAL IMPAIRMENT IMPACTS SOME TESTING. ESRD-HD. ANTIBIOTICS - VANCOMYCIN 10.90 YESTERDAY.  ELOS - 1-2 WEEKS.      TEAM CONF - MAY 24 - ALWAYS SO PROCESSING AFTER EACH DIALYSIS SESSION. AT HOME WOULD GO BACK TO HOME AND SLEEP. BED MIN ASSIST. TRANSFERS MIN. GAIT 80   FEET RW CTG. 4 STAIRS CTG. TOILET TRASNFERS AND SHOWER TRANSFERS MIN CTG. BATH CTG. LBD CTG. UBD SET UP. GROOMING SET UP. TOILETING CTG.   LEFT GROIN - ADDUCTOR PAIN - There is mild joint space narrowing involving both hips symmetrically. There are also some minimal degenerative changes involving both hips. The overall appearance is similar to the study of 12/16/2021. MODERATE WORD RETRIEVAL DEFICITS. IMPAIRED VISION AFFECTS HOME MANAGEMENT TASKS. HYPOGLLYCEMIA AFTER SSI .DECREASED TO 0-7 UNITS.   ELOS - ONE WEEK.         TEAM CONF - MAY 31 - TRANSFERS CTG. GAIT 40- FEET CTG RW LIMITED BY THIGH PAIN. TOILET TRANSFERS CTG. BATH CTG. LBD CTG. UBD SET UP. TOILETING CTG.  GROOMING SET UP. COGNITION - MODERATE WORD RETRIEVAL DEFICITS, MODERATE IMPAIRED MEMORY IMPACTED BY FATIGUE, STILL SLOW PROCESSING.  BNE (Active)  Att'n. - Mildly Imp.  Exec. Fx. - Mildly Imp., slowed processing speed  Rsng/Jgmnt - WNL  Arith - Mod Imp.  Visuospatial Skills - DNA, pt declined citing poor vision  Visual Mem. DNA  Verbal Mem. - WNL  Emot - Pt endorsed mild dysphoria and anxiousness secodnary to current inpatient  Stay.  CONTINENT BOWEL. OCCASIONAL VOID. ESRD-HD. ON INSULIN. SKIN INTACT. LIDODERM PATCH TO  LEFT ADDUCTOR TENDON. COURSE OF STEROID FOR LEFT THIGH JEREMI. CONSULTED ORTHO FOR INPUT.  ELOS - POSSIBLY Thursday DEPENDING ON FURTHER EVALUATIONS.            TEAM CONF - JUNE 21 -  DECLINED THERAPY DUE TO PAIN.  AT MOST RECENT THERAPY WHEN PARTICIPATED, TRANSFERS MIN. GAIT 80 FEET MIN / CTG MIN ASSIST RW. TOILET TRANSFERS CTG. TOILETING CTG. MODERATE IMPAIRED VERBAL MEMORY. PAIN MANAGEMENT - MEDS ADJUSTED - OXYCODONE 2.5 MG Q 6 HOURS PRN, TRAMADOL 25 MG Q 4 HOURS. MUSCLE BIOPSY RESULTS PENDING. STARTING ON PREDNISONE 60 MG DAILY YESTERDAY. WILL NEED TO ADJUST INSULING, BLOOD GLUCOSE > 300 THIS AM. GASTROENTEROLOGY EVALUATING LIVER. PATIENT DECLINES LIVER BIOPSY.  HYPOTHYROID - levothyroxine increased to 150mcg daily-continue this dose and she will need follow up TSH in 4-6 weeks from June 11 as an outpatient  ELOS -   1.5 WEEKS     TEAM CONF - June 28 - BED MIN  SIT TO SUPINE, SBA SUPINE TO SIT. CAR TRANSFERS CTG. TRANSFERS MIN ASSIST. 4 STAIRS MIN. GAIT 240 FEET CTG SBA. TOILET TRANSFERS MIN. UBB SBA. LBB MIN WITH LLE. UBD SET UP. LBD MOD ASSIST LLE.   INSULIN ADJUSTED FOR ELEVATED BLOOD GLUCOSE ON STEROIDS. ON PREDNISONE 60 MG DAILY AND PLAN TO TAPER OVER NEXT MONTH . BIOPSY REPORT SENT TO OUTPATIENT RHEUMATOLOGIST YESTERDAY. ADDITIONAL STUDIES ON BIOPSY PENDING.   BLADDER SCAN 400 CC BUT ONLY 200 CC ON INTERMITTENT STRAIGHT CATH. LEFT QUAD MUSCLE BIOPSY SITE HEALING.  ESRD - HD.   ELOS - Thursday WITH HOME HEALTH PT, OT, AND NURSING FOR DIABETES AND MONITORING ON STEROIDS.   Addendum-was not able to do car transfer after hemodialysis on Friday, July 1 and continued with inpatient rehab course.    Rehabilitation-July 7-Patient has not been able to participate in therapy for 3 hours a day.  Will ask internal medicine to assess for transfer to the acute care wing in the hospital  July 8-Was evaluated by internal medicine service who did not feel that [facility]    [facility]  Frederic Florentino MD       During rounds, used appropriate personal protective equipment including mask and gloves.  Additional gown if indicated.  Mask used was standard procedure mask. Appropriate PPE was worn during the entire visit.  Hand hygiene was completed before and after.

## 2022-07-11 NOTE — PROGRESS NOTES
"Nutrition Services    Patient Name:  Zaina Martinez  YOB: 1965  MRN: [id number]  Admit Date:  5/13/2022    PROGRESS NOTE - REHAB    Encounter Information         Trending Narrative Follow up  7/11: Reportedly eating better. Family brings in food at times. Still has thigh edema (s/p L quad muscle bx). HD 3 x per week.     7/4: AMY CHAIDEZ concerned with pt's ^K+ levels. RN added \"renal\" restriction to diet. Remains on fluid restriction. Pt was off unit in HD.     6/27: PRN pain medications less frequently.  Eating better, 100% at meals.  Muscle biopsy showed type II muscle atrophy, which is usually the result of other chronic illness resulting in muscle wasting.  HD today.     6/20: Intake varies, 25-75%. BG inconsistent as a result, nsg having to hold insulin. Muscle bx done 6/15 on thigh. Awaiting results. Dislikes fluid restriction.    6/13: Intake 50-75% most meals. Didn't care for lunch today. Glucoses are variable. To have MRI of T-spine and muscle biopsy Wednesday.     6/6: PO %. Drinking Boost GC at times. Glu still elevated - in the 300's earlier today. Still has pain in L thigh.     5/30: Left thigh edema and LE edema (mild) noted. Steroids started which have helped with burning sensation/pain. BG have been elevated. Intake has been variable.     5/23: Having some pain issues in L groin area. MD ordered xray to check left hip for bony changes. Intake/appetite just fair. Continues on HD. Oliguric. Plan to add in nova renal shakes daily given suboptimal intake         Factors Affecting Intake decreased appetite, dislikes hospital food, pain issues, weakness     Current Nutrition Orders & Evaluation of Intake       Oral Nutrition     Food Allergies NKFA   Current PO Diet Diet Regular; Thin; Consistent Carbohydrate, Low Potassium, Renal, Daily Fluid Restriction; Other; 1,200; 2 gm (50 mEq) + 1500 cc fluid restriction   Supplement Novasource Renal , Daily   PO Evaluation     Trending % PO " "Intake % most meals x 3 days       --  Anthropometrics          Height    Weight Height: 157.5 cm (62.01\")  Weight: 75.5 kg (166 lb 7.2 oz) (07/04/22 1756)    BMI kg/m2 Body mass index is 30.44 kg/m².    Weight trend ..      06/15/22  1100 07/01/22  1700 07/04/22  1756   Weight: 62.5 kg (137 lb 12.6 oz) 73.9 kg (163 lb) 75.5 kg (166 lb 7.2 oz)        Physical Findings         Physical Appearance  alert, room air (per nsg)      Edema  lower extremity , 2+ (mild)    Gastrointestinal constipation, last bowel movement: 7/4    Tubes/Drains none    Oral/Mouth Cavity WNL    Skin other: L anterior thigh incision from bx; rash on L buttock/lower back noted.      Tests/Procedures/Labs        Tests/Procedures No new tests/procedures        Pertinent Labs     Results from last 7 days   Lab Units 07/10/22  0503 07/09/22  0710 07/08/22  0905   SODIUM mmol/L 133* 131* 134*   POTASSIUM mmol/L 4.4 3.8 4.2   CHLORIDE mmol/L 96* 96* 97*   CO2 mmol/L 24.0 24.6 24.9   BUN mg/dL 34* 22* 21*   CREATININE mg/dL 3.17* 2.17* 2.21*   CALCIUM mg/dL 7.8* 7.6* 7.7*   BILIRUBIN mg/dL 0.4 0.5 0.6   ALK PHOS U/L 572* 597* 627*   ALT (SGPT) U/L 18 21 24   AST (SGOT) U/L 32 35* 42*   GLUCOSE mg/dL 156* 167* 161*     Results from last 7 days   Lab Units 07/10/22  0503   PHOSPHORUS mg/dL 3.9   HEMOGLOBIN g/dL 7.9*   HEMATOCRIT % 24.9*   WBC 10*3/mm3 8.20     Results from last 7 days   Lab Units 07/10/22  0503 07/09/22  0711 07/08/22  0905 07/07/22  1014 07/06/22  1135   PLATELETS 10*3/mm3 170 173 175 173 214     COVID19   Date Value Ref Range Status   06/14/2022 Not Detected Not Detected - Ref. Range Final     Lab Results   Component Value Date    HGBA1C 6.60 (H) 06/23/2022          Medications            Scheduled Medications aspirin, 81 mg, Oral, Daily  b complex-vitamin c-folic acid, 1 tablet, Oral, Daily  carvedilol, 25 mg, Oral, BID With Meals  cloNIDine, 0.2 mg, Oral, Q12H  epoetin brooke/brooke-epbx, 10,000 Units, Intravenous, Once per day on " Mon Wed Fri  gabapentin, 100 mg, Oral, Q24H  gabapentin, 300 mg, Oral, BID  hydrALAZINE, 100 mg, Oral, Q8H  insulin glargine, 7 Units, Subcutaneous, QAM  insulin lispro, 0-7 Units, Subcutaneous, TID AC  levothyroxine, 150 mcg, Oral, Q AM  lidocaine, 1 patch, Transdermal, Q24H  losartan, 100 mg, Oral, Q24H  miconazole, 1 application, Topical, Q12H  pantoprazole, 40 mg, Oral, Q AM  rOPINIRole, 0.75 mg, Oral, Nightly  sertraline, 150 mg, Oral, Daily  sodium zirconium cyclosilicate, 5 g, Oral, Daily  tamsulosin, 0.4 mg, Oral, Daily        Infusions     --  Intervention Goal        Intervention Goal(s) Reduce/improve symptoms, Disease management/therapy, Tolerate PO , No significant weight loss and PO intake goal %: 75     Nutrition Intervention        RD Action Menu provided, Supplement provided, Encourage intake, Follow Tx Progress and Care plan reviewed     Nutrition Prescription         Diet Prescription    Supplement Prescription    EN Prescription    --  Monitor/Evaluation        Monitor Per protocol     RD to follow up per protocol.    Electronically signed by:  Carol Jennings RD  07/11/22 14:00 EDT

## 2022-07-11 NOTE — PLAN OF CARE
Problem: Rehabilitation ([facility]) Plan of Care  Goal: Plan of Care Review  Recent Flowsheet Documentation  Taken 7/11/2022 0356 by Jannie Carrillo, RN  Progress: improving  Plan of Care Reviewed With: patient  Outcome Evaluation: Pt is AOx4, forgetful at times, calm, cooperative, Pt more alert today, she recd pain meds at beginning of day shift on 7/10 and then at bedtime 7/10, tolerated pain meds, she went to get CT scan around 2200 and has been resting well since then, rash on lower back is improving, applied cream per order, will continue to monitor.   Goal Outcome Evaluation:  Plan of Care Reviewed With: patient        Progress: improving  Outcome Evaluation: Pt is AOx4, forgetful at times, calm, cooperative, Pt more alert today, she recd pain meds at beginning of day shift on 7/10 and then at bedtime 7/10, tolerated pain meds, she went to get CT scan around 2200 and has been resting well since then, rash on lower back is improving, applied cream per order, will continue to monitor.

## 2022-07-11 NOTE — PROGRESS NOTES
"DAILY PROGRESS NOTE  [facility]    Patient Identification:  Name: Zaina Martinez  Age: 56 y.o.  Sex: female  :  1965  MRN: [id number]         Primary Care Physician: Karson Oreilly MD    Subjective:  Interval History:She complains of leg pain.  Better today.  Getting HD    Objective:    Scheduled Meds:aspirin, 81 mg, Oral, Daily  b complex-vitamin c-folic acid, 1 tablet, Oral, Daily  carvedilol, 25 mg, Oral, BID With Meals  cloNIDine, 0.2 mg, Oral, Q12H  epoetin brooke/brooke-epbx, 10,000 Units, Intravenous, Once per day on   gabapentin, 100 mg, Oral, Q24H  gabapentin, 300 mg, Oral, BID  hydrALAZINE, 100 mg, Oral, Q8H  insulin glargine, 7 Units, Subcutaneous, QAM  insulin lispro, 0-7 Units, Subcutaneous, TID AC  levothyroxine, 150 mcg, Oral, Q AM  lidocaine, 1 patch, Transdermal, Q24H  losartan, 100 mg, Oral, Q24H  miconazole, 1 application, Topical, Q12H  pantoprazole, 40 mg, Oral, Q AM  rOPINIRole, 0.75 mg, Oral, Nightly  sertraline, 150 mg, Oral, Daily  sodium zirconium cyclosilicate, 5 g, Oral, Daily  tamsulosin, 0.4 mg, Oral, Daily      Continuous Infusions:     Vital signs in last 24 hours:  Temp:  [97.7 °F (36.5 °C)-98.6 °F (37 °C)] 98.6 °F (37 °C)  Heart Rate:  [52-59] 52  Resp:  [16-18] 18  BP: (138-155)/(62-80) 155/70    Intake/Output:    Intake/Output Summary (Last 24 hours) at 2022 1804  Last data filed at 2022 1145  Gross per 24 hour   Intake 462 ml   Output --   Net 462 ml       Exam:  /70 (BP Location: Right arm, Patient Position: Sitting)   Pulse 52   Temp 98.6 °F (37 °C) (Oral)   Resp 18   Ht 157.5 cm (62.01\")   Wt 75.5 kg (166 lb 7.2 oz)   SpO2 95%   BMI 30.44 kg/m²     General Appearance:    Alert, cooperative, no distress   Head:    Normocephalic, without obvious abnormality, atraumatic   Eyes:       Throat:   Lips, tongue, gums normal   Neck:   Supple, symmetrical, trachea midline, no JVD   Lungs:     Clear to auscultation bilaterally, " respirations unlabored   Chest Wall:    No tenderness or deformity    Heart:    Regular rate and rhythm, S1 and S2 normal, no murmur,no  Rub or gallop   Abdomen:     Soft, nontender, bowel sounds active, no masses, no organomegaly    Extremities:   Extremities normal, atraumatic, no cyanosis, improving edema in lower extremities   Pulses:      Skin:   Skin is warm and dry,  no rashes or palpable lesions   Neurologic:   no focal deficits noted      Lab Results (last 72 hours)     Procedure Component Value Units Date/Time    POC Glucose Once [[id number]]  (Normal) Collected: 07/02/22 1719    Specimen: Blood Updated: 07/02/22 1721     Glucose 102 mg/dL      Comment: Meter: [id number] : [id number] Smooth Miranda RN       POC Glucose Once [[id number]]  (Abnormal) Collected: 07/02/22 1631    Specimen: Blood Updated: 07/02/22 1635     Glucose 41 mg/dL      Comment: RN Notified R and V Meter: [id number] : [id number] Smooth Miranda RN       POC Glucose Once [[id number]]  (Normal) Collected: 07/02/22 1151    Specimen: Blood Updated: 07/02/22 1152     Glucose 97 mg/dL      Comment: Meter: [id number] : [id number] Randolph SAAVEDRA       POC Glucose Once [[id number]]  (Normal) Collected: 07/02/22 1024    Specimen: Blood Updated: 07/02/22 1025     Glucose 120 mg/dL      Comment: Meter: [id number] : [id number] Smooth Miradna RN       Basic Metabolic Panel [[id number]]  (Abnormal) Collected: 07/02/22 0828    Specimen: Blood Updated: 07/02/22 0933     Glucose 65 mg/dL      BUN 40 mg/dL      Creatinine 2.50 mg/dL      Sodium 135 mmol/L      Potassium 5.3 mmol/L      Chloride 98 mmol/L      CO2 24.0 mmol/L      Calcium 7.6 mg/dL      BUN/Creatinine Ratio 16.0     Anion Gap 13.0 mmol/L      eGFR 22.1 mL/min/1.73      Comment: National Kidney Foundation and American Society of Nephrology (ASN) Task Force recommended calculation based on the Chronic Kidney Disease Epidemiology Collaboration (CKD-EPI) equation refit without  adjustment for race.       Narrative:      GFR Normal >60  Chronic Kidney Disease <60  Kidney Failure <15      Hepatic Function Panel [[id number]]  (Abnormal) Collected: 07/02/22 0828    Specimen: Blood Updated: 07/02/22 0931     Total Protein 5.5 g/dL      Albumin 2.80 g/dL      ALT (SGPT) 14 U/L      AST (SGOT) 28 U/L      Alkaline Phosphatase 380 U/L      Total Bilirubin 0.4 mg/dL      Bilirubin, Direct <0.2 mg/dL      Bilirubin, Indirect --     Comment: Unable to calculate       Phosphorus [[id number]]  (Abnormal) Collected: 07/02/22 0828    Specimen: Blood Updated: 07/02/22 0931     Phosphorus 4.6 mg/dL     CBC & Differential [[id number]]  (Abnormal) Collected: 07/02/22 0828    Specimen: Blood Updated: 07/02/22 0904    Narrative:      The following orders were created for panel order CBC & Differential.  Procedure                               Abnormality         Status                     ---------                               -----------         ------                     CBC Auto Differential[[id number]]        Abnormal            Final result                 Please view results for these tests on the individual orders.    CBC Auto Differential [[id number]]  (Abnormal) Collected: 07/02/22 0828    Specimen: Blood Updated: 07/02/22 0904     WBC 21.35 10*3/mm3      RBC 3.21 10*6/mm3      Hemoglobin 8.4 g/dL      Hematocrit 26.8 %      MCV 83.5 fL      MCH 26.2 pg      MCHC 31.3 g/dL      RDW 18.9 %      RDW-SD 56.6 fl      MPV 10.0 fL      Platelets 234 10*3/mm3      Neutrophil % 81.5 %      Lymphocyte % 10.6 %      Monocyte % 6.7 %      Eosinophil % 0.1 %      Basophil % 0.1 %      Immature Grans % 1.0 %      Neutrophils, Absolute 17.40 10*3/mm3      Lymphocytes, Absolute 2.27 10*3/mm3      Monocytes, Absolute 1.42 10*3/mm3      Eosinophils, Absolute 0.02 10*3/mm3      Basophils, Absolute 0.02 10*3/mm3      Immature Grans, Absolute 0.22 10*3/mm3      nRBC 0.0 /100 WBC     POC Glucose Once [[id number]]  (Normal)  Collected: 07/02/22 0614    Specimen: Blood Updated: 07/02/22 0615     Glucose 92 mg/dL      Comment: Meter: [id number] : [id number] Digital Reasoning NA       POC Glucose Once [[id number]]  (Abnormal) Collected: 07/02/22 0253    Specimen: Blood Updated: 07/02/22 0254     Glucose 140 mg/dL      Comment: Meter: [id number] : [id number] Kushal Choe RN       POC Glucose Once [[id number]]  (Abnormal) Collected: 07/01/22 2036    Specimen: Blood Updated: 07/01/22 2037     Glucose 139 mg/dL      Comment: Meter: [id number] : [id number] Digital Reasoning NA       Hepatic Function Panel [[id number]]  (Abnormal) Collected: 07/01/22 1827    Specimen: Blood Updated: 07/01/22 1926     Total Protein 6.0 g/dL      Albumin 3.20 g/dL      ALT (SGPT) 18 U/L      AST (SGOT) 33 U/L      Alkaline Phosphatase 450 U/L      Total Bilirubin 0.5 mg/dL      Bilirubin, Direct 0.2 mg/dL      Bilirubin, Indirect 0.3 mg/dL     Basic Metabolic Panel [[id number]]  (Abnormal) Collected: 07/01/22 1827    Specimen: Blood Updated: 07/01/22 1926     Glucose 75 mg/dL      BUN 34 mg/dL      Creatinine 2.21 mg/dL      Sodium 134 mmol/L      Potassium 5.4 mmol/L      Chloride 97 mmol/L      CO2 24.0 mmol/L      Calcium 7.9 mg/dL      BUN/Creatinine Ratio 15.4     Anion Gap 13.0 mmol/L      eGFR 25.6 mL/min/1.73      Comment: National Kidney Foundation and American Society of Nephrology (ASN) Task Force recommended calculation based on the Chronic Kidney Disease Epidemiology Collaboration (CKD-EPI) equation refit without adjustment for race.       Narrative:      GFR Normal >60  Chronic Kidney Disease <60  Kidney Failure <15      Phosphorus [[id number]]  (Normal) Collected: 07/01/22 1827    Specimen: Blood Updated: 07/01/22 1926     Phosphorus 3.2 mg/dL     CBC & Differential [[id number]]  (Abnormal) Collected: 07/01/22 1827    Specimen: Blood Updated: 07/01/22 1909    Narrative:      The following orders were created for panel order CBC &  Differential.  Procedure                               Abnormality         Status                     ---------                               -----------         ------                     CBC Auto Differential[[id number]]        Abnormal            Final result                 Please view results for these tests on the individual orders.    CBC Auto Differential [[id number]]  (Abnormal) Collected: 07/01/22 1827    Specimen: Blood Updated: 07/01/22 1909     WBC 24.21 10*3/mm3      RBC 3.60 10*6/mm3      Hemoglobin 9.4 g/dL      Hematocrit 30.0 %      MCV 83.3 fL      MCH 26.1 pg      MCHC 31.3 g/dL      RDW 19.0 %      RDW-SD 56.4 fl      MPV 10.3 fL      Platelets 288 10*3/mm3      Neutrophil % 94.6 %      Lymphocyte % 2.7 %      Monocyte % 1.4 %      Eosinophil % 0.0 %      Basophil % 0.1 %      Immature Grans % 1.2 %      Neutrophils, Absolute 22.89 10*3/mm3      Lymphocytes, Absolute 0.65 10*3/mm3      Monocytes, Absolute 0.34 10*3/mm3      Eosinophils, Absolute 0.00 10*3/mm3      Basophils, Absolute 0.03 10*3/mm3      Immature Grans, Absolute 0.30 10*3/mm3      nRBC 0.0 /100 WBC     POC Glucose Once [[id number]]  (Abnormal) Collected: 07/01/22 1606    Specimen: Blood Updated: 07/01/22 1607     Glucose 287 mg/dL      Comment: Meter: [id number] : [id number] Dierken Jessica L RN       POC Glucose Once [[id number]]  (Normal) Collected: 07/01/22 1246    Specimen: Blood Updated: 07/01/22 1247     Glucose 126 mg/dL      Comment: Meter: [id number] : [id number] Dierken Jessica L RN       POC Glucose Once [[id number]]  (Normal) Collected: 07/01/22 0639    Specimen: Blood Updated: 07/01/22 0641     Glucose 95 mg/dL      Comment: Meter: [id number] : [id number] [employer] CNA       POC Glucose Once [[id number]]  (Abnormal) Collected: 07/01/22 0208    Specimen: Blood Updated: 07/01/22 0210     Glucose 186 mg/dL      Comment: Meter: [id number] : [id number] Jennie Gilliam CNPRANAY       POC Glucose Once [[id number]]   (Abnormal) Collected: 06/30/22 2032    Specimen: Blood Updated: 06/30/22 2034     Glucose 251 mg/dL      Comment: Meter: [id number] : [id number] Jennie Gilliam CNA       POC Glucose Once [[id number]]  (Abnormal) Collected: 06/30/22 1615    Specimen: Blood Updated: 06/30/22 1616     Glucose 197 mg/dL      Comment: Meter: [id number] : [id number] Makharadze Firuza NA       POC Glucose Once [[id number]]  (Normal) Collected: 06/30/22 1149    Specimen: Blood Updated: 06/30/22 1201     Glucose 100 mg/dL      Comment: Meter: [id number] : [id number] Makharadze Firuza NA       POC Glucose Once [[id number]]  (Abnormal) Collected: 06/30/22 1115    Specimen: Blood Updated: 06/30/22 1117     Glucose 68 mg/dL      Comment: Meter: [id number] : [id number] Makharadze Firuza NA       POC Glucose Once [[id number]]  (Abnormal) Collected: 06/30/22 1100    Specimen: Blood Updated: 06/30/22 1102     Glucose 55 mg/dL      Comment: Meter: [id number] : [id number] Makharadze Firuza NA       POC Glucose Once [[id number]]  (Abnormal) Collected: 06/30/22 1045    Specimen: Blood Updated: 06/30/22 1045     Glucose 55 mg/dL      Comment: Meter: [id number] : [id number] Edson MCELROY RN       Basic Metabolic Panel [[id number]]  (Abnormal) Collected: 06/30/22 0912    Specimen: Blood Updated: 06/30/22 1041     Glucose 91 mg/dL      BUN 43 mg/dL      Creatinine 2.61 mg/dL      Sodium 134 mmol/L      Potassium 5.3 mmol/L      Chloride 100 mmol/L      CO2 23.0 mmol/L      Calcium 7.6 mg/dL      BUN/Creatinine Ratio 16.5     Anion Gap 11.0 mmol/L      eGFR 21.0 mL/min/1.73      Comment: National Kidney Foundation and American Society of Nephrology (ASN) Task Force recommended calculation based on the Chronic Kidney Disease Epidemiology Collaboration (CKD-EPI) equation refit without adjustment for race.       Narrative:      GFR Normal >60  Chronic Kidney Disease <60  Kidney Failure <15      POC Glucose Once [[id number]]  (Abnormal)  Collected: 06/30/22 1034    Specimen: Blood Updated: 06/30/22 1040     Glucose 43 mg/dL      Comment: Result Not Confirmed Meter: [id number] : [id number] Edson Lopez NAVI REYES       Hepatic Function Panel [[id number]]  (Abnormal) Collected: 06/30/22 0912    Specimen: Blood Updated: 06/30/22 1031     Total Protein 5.9 g/dL      Albumin 2.60 g/dL      ALT (SGPT) 15 U/L      AST (SGOT) 30 U/L      Alkaline Phosphatase 426 U/L      Total Bilirubin 0.4 mg/dL      Bilirubin, Direct 0.3 mg/dL      Bilirubin, Indirect 0.1 mg/dL     Phosphorus [[id number]]  (Normal) Collected: 06/30/22 0912    Specimen: Blood Updated: 06/30/22 1031     Phosphorus 4.0 mg/dL     CBC & Differential [[id number]]  (Abnormal) Collected: 06/30/22 0912    Specimen: Blood Updated: 06/30/22 1010    Narrative:      The following orders were created for panel order CBC & Differential.  Procedure                               Abnormality         Status                     ---------                               -----------         ------                     CBC Auto Differential[[id number]]        Abnormal            Final result                 Please view results for these tests on the individual orders.    CBC Auto Differential [[id number]]  (Abnormal) Collected: 06/30/22 0912    Specimen: Blood Updated: 06/30/22 1010     WBC 19.91 10*3/mm3      RBC 3.25 10*6/mm3      Hemoglobin 8.5 g/dL      Hematocrit 27.2 %      MCV 83.7 fL      MCH 26.2 pg      MCHC 31.3 g/dL      RDW 18.1 %      RDW-SD 53.9 fl      MPV 10.0 fL      Platelets 315 10*3/mm3      Neutrophil % 81.8 %      Lymphocyte % 11.2 %      Monocyte % 5.4 %      Eosinophil % 0.2 %      Basophil % 0.1 %      Immature Grans % 1.3 %      Neutrophils, Absolute 16.30 10*3/mm3      Lymphocytes, Absolute 2.23 10*3/mm3      Monocytes, Absolute 1.07 10*3/mm3      Eosinophils, Absolute 0.03 10*3/mm3      Basophils, Absolute 0.02 10*3/mm3      Immature Grans, Absolute 0.26 10*3/mm3      nRBC 0.1 /100 WBC      POC Glucose Once [[id number]]  (Normal) Collected: 06/30/22 0631    Specimen: Blood Updated: 06/30/22 0633     Glucose 117 mg/dL      Comment: Meter: [id number] : [id number] Smyzer Inge NA       POC Glucose Once [[id number]]  (Abnormal) Collected: 06/30/22 0310    Specimen: Blood Updated: 06/30/22 0311     Glucose 181 mg/dL      Comment: Meter: [id number] : [id number] Smyzer Inge NA       POC Glucose Once [[id number]]  (Abnormal) Collected: 06/29/22 2046    Specimen: Blood Updated: 06/29/22 2048     Glucose 219 mg/dL      Comment: Meter: [id number] : [id number] Smyzer Inge NA       POC Glucose Once [[id number]]  (Normal) Collected: 06/29/22 1901    Specimen: Blood Updated: 06/29/22 1902     Glucose 101 mg/dL      Comment: Meter: [id number] : [id number] Cricket Harley NA       POC Glucose Once [[id number]]  (Abnormal) Collected: 06/29/22 1842    Specimen: Blood Updated: 06/29/22 1844     Glucose 56 mg/dL      Comment: Meter: [id number] : [id number] Anup SAAVEDRA           Data Review:  Results from last 7 days   Lab Units 07/10/22  0503 07/09/22  0710 07/08/22  0905   SODIUM mmol/L 133* 131* 134*   POTASSIUM mmol/L 4.4 3.8 4.2   CHLORIDE mmol/L 96* 96* 97*   CO2 mmol/L 24.0 24.6 24.9   BUN mg/dL 34* 22* 21*   CREATININE mg/dL 3.17* 2.17* 2.21*   GLUCOSE mg/dL 156* 167* 161*   CALCIUM mg/dL 7.8* 7.6* 7.7*     Results from last 7 days   Lab Units 07/10/22  0503 07/09/22  0711 07/08/22  0905   WBC 10*3/mm3 8.20 7.81 8.42   HEMOGLOBIN g/dL 7.9* 8.1* 8.1*   HEMATOCRIT % 24.9* 25.8* 26.2*   PLATELETS 10*3/mm3 170 173 175             Lab Results   Lab Value Date/Time    TROPONINT 0.092 (C) 12/19/2021 1314    TROPONINT 0.117 (C) 11/30/2021 0153    TROPONINT 0.132 (C) 11/29/2021 2121         Results from last 7 days   Lab Units 07/10/22  0503 07/09/22  0710 07/08/22  0905   ALK PHOS U/L 572* 597* 627*   BILIRUBIN mg/dL 0.4 0.5 0.6   BILIRUBIN DIRECT mg/dL 0.2 0.3 0.4*   ALT (SGPT) U/L  18 21 24   AST (SGOT) U/L 32 35* 42*             Glucose   Date/Time Value Ref Range Status   07/11/2022 1213 167 (H) 70 - 130 mg/dL Final     Comment:     Meter: [id number] : [id number] Anup Martin NA   07/11/2022 0636 266 (H) 70 - 130 mg/dL Final     Comment:     Meter: [id number] : [id number] Catrachita MAJOR RN   07/10/2022 2159 172 (H) 70 - 130 mg/dL Final     Comment:     Meter: [id number] : [id number] Lorena Valderrama RN   07/10/2022 1621 134 (H) 70 - 130 mg/dL Final     Comment:     Meter: [id number] : [id number] Vincent Donna NA   07/10/2022 1153 184 (H) 70 - 130 mg/dL Final     Comment:     Meter: [id number] : [id number] Vincent Donna    07/10/2022 0623 163 (H) 70 - 130 mg/dL Final     Comment:     Meter: [id number] : [id number] Jennie Gilliam [facility]   07/09/2022 2102 126 70 - 130 mg/dL Final     Comment:     Meter: [id number] : [id number] Jennie Gilliam A   07/09/2022 1629 242 (H) 70 - 130 mg/dL Final     Comment:     Meter: [id number] : [id number] Carlton Thompson NA           Past Medical History:   Diagnosis Date   • Acute CVA (cerebrovascular accident) (HCC) 5/1/2022   • Acute on chronic diastolic CHF (congestive heart failure) (HCC)    • CAD (coronary artery disease) 12/20/2021   • Diabetes (HCC)    • Disease of thyroid gland    • GERD (gastroesophageal reflux disease)    • Hyperlipidemia 11/30/2018   • Hypertension    • Rheumatoid arthritis (HCC)        Assessment:  Active Hospital Problems    Diagnosis  POA   • 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       Plan:  Insulin same dose.  Continue same and monitor.  The trend is better.  Pain better.   HD per renal.  We will repeat some CT scans  and MRI of left leg. Venous duplex negative. Not showing anything new.  Discussed with Dr Florentino.  Some studies still pending on muscle biopsy. Also should get PET scan as outpatient. Encouraged her to participate in therapy.    Aris Isbell MD  7/11/2022  18:04 EDT

## 2022-07-12 LAB
ALBUMIN SERPL-MCNC: 2.9 G/DL (ref 3.5–5.2)
ALBUMIN SERPL-MCNC: 3.1 G/DL (ref 3.5–5.2)
ALP SERPL-CCNC: 551 U/L (ref 39–117)
ALP SERPL-CCNC: 571 U/L (ref 39–117)
ALT SERPL W P-5'-P-CCNC: 18 U/L (ref 1–33)
ALT SERPL W P-5'-P-CCNC: 18 U/L (ref 1–33)
ANION GAP SERPL CALCULATED.3IONS-SCNC: 10 MMOL/L (ref 5–15)
AST SERPL-CCNC: 31 U/L (ref 1–32)
AST SERPL-CCNC: 34 U/L (ref 1–32)
BASOPHILS # BLD AUTO: 0.02 10*3/MM3 (ref 0–0.2)
BASOPHILS # BLD AUTO: 0.02 10*3/MM3 (ref 0–0.2)
BASOPHILS NFR BLD AUTO: 0.3 % (ref 0–1.5)
BASOPHILS NFR BLD AUTO: 0.3 % (ref 0–1.5)
BILIRUB CONJ SERPL-MCNC: 0.3 MG/DL (ref 0–0.3)
BILIRUB CONJ SERPL-MCNC: 0.3 MG/DL (ref 0–0.3)
BILIRUB INDIRECT SERPL-MCNC: 0.2 MG/DL
BILIRUB INDIRECT SERPL-MCNC: 0.2 MG/DL
BILIRUB SERPL-MCNC: 0.5 MG/DL (ref 0–1.2)
BILIRUB SERPL-MCNC: 0.5 MG/DL (ref 0–1.2)
BUN SERPL-MCNC: 22 MG/DL (ref 6–20)
BUN/CREAT SERPL: 9.5 (ref 7–25)
CALCIUM SPEC-SCNC: 8.3 MG/DL (ref 8.6–10.5)
CHLORIDE SERPL-SCNC: 99 MMOL/L (ref 98–107)
CK SERPL-CCNC: 39 U/L (ref 20–180)
CO2 SERPL-SCNC: 24 MMOL/L (ref 22–29)
CREAT SERPL-MCNC: 2.31 MG/DL (ref 0.57–1)
DEPRECATED RDW RBC AUTO: 51.2 FL (ref 37–54)
DEPRECATED RDW RBC AUTO: 52.1 FL (ref 37–54)
EGFRCR SERPLBLD CKD-EPI 2021: 24.3 ML/MIN/1.73
EOSINOPHIL # BLD AUTO: 0.07 10*3/MM3 (ref 0–0.4)
EOSINOPHIL # BLD AUTO: 0.08 10*3/MM3 (ref 0–0.4)
EOSINOPHIL NFR BLD AUTO: 1.1 % (ref 0.3–6.2)
EOSINOPHIL NFR BLD AUTO: 1.1 % (ref 0.3–6.2)
ERYTHROCYTE [DISTWIDTH] IN BLOOD BY AUTOMATED COUNT: 17.6 % (ref 12.3–15.4)
ERYTHROCYTE [DISTWIDTH] IN BLOOD BY AUTOMATED COUNT: 17.7 % (ref 12.3–15.4)
GLUCOSE BLDC GLUCOMTR-MCNC: 153 MG/DL (ref 70–130)
GLUCOSE BLDC GLUCOMTR-MCNC: 159 MG/DL (ref 70–130)
GLUCOSE BLDC GLUCOMTR-MCNC: 196 MG/DL (ref 70–130)
GLUCOSE BLDC GLUCOMTR-MCNC: 229 MG/DL (ref 70–130)
GLUCOSE SERPL-MCNC: 202 MG/DL (ref 65–99)
HCT VFR BLD AUTO: 25.2 % (ref 34–46.6)
HCT VFR BLD AUTO: 25.9 % (ref 34–46.6)
HGB BLD-MCNC: 8 G/DL (ref 12–15.9)
HGB BLD-MCNC: 8.3 G/DL (ref 12–15.9)
IMM GRANULOCYTES # BLD AUTO: 0.05 10*3/MM3 (ref 0–0.05)
IMM GRANULOCYTES # BLD AUTO: 0.05 10*3/MM3 (ref 0–0.05)
IMM GRANULOCYTES NFR BLD AUTO: 0.7 % (ref 0–0.5)
IMM GRANULOCYTES NFR BLD AUTO: 0.8 % (ref 0–0.5)
LYMPHOCYTES # BLD AUTO: 0.5 10*3/MM3 (ref 0.7–3.1)
LYMPHOCYTES # BLD AUTO: 0.91 10*3/MM3 (ref 0.7–3.1)
LYMPHOCYTES NFR BLD AUTO: 14.4 % (ref 19.6–45.3)
LYMPHOCYTES NFR BLD AUTO: 7.1 % (ref 19.6–45.3)
MCH RBC QN AUTO: 25.9 PG (ref 26.6–33)
MCH RBC QN AUTO: 26.6 PG (ref 26.6–33)
MCHC RBC AUTO-ENTMCNC: 31.7 G/DL (ref 31.5–35.7)
MCHC RBC AUTO-ENTMCNC: 32 G/DL (ref 31.5–35.7)
MCV RBC AUTO: 81.6 FL (ref 79–97)
MCV RBC AUTO: 83 FL (ref 79–97)
MONOCYTES # BLD AUTO: 0.51 10*3/MM3 (ref 0.1–0.9)
MONOCYTES # BLD AUTO: 0.51 10*3/MM3 (ref 0.1–0.9)
MONOCYTES NFR BLD AUTO: 7.3 % (ref 5–12)
MONOCYTES NFR BLD AUTO: 8 % (ref 5–12)
NEUTROPHILS NFR BLD AUTO: 4.78 10*3/MM3 (ref 1.7–7)
NEUTROPHILS NFR BLD AUTO: 5.86 10*3/MM3 (ref 1.7–7)
NEUTROPHILS NFR BLD AUTO: 75.4 % (ref 42.7–76)
NEUTROPHILS NFR BLD AUTO: 83.5 % (ref 42.7–76)
NRBC BLD AUTO-RTO: 0 /100 WBC (ref 0–0.2)
NRBC BLD AUTO-RTO: 0.2 /100 WBC (ref 0–0.2)
PHOSPHATE SERPL-MCNC: 3 MG/DL (ref 2.5–4.5)
PLATELET # BLD AUTO: 149 10*3/MM3 (ref 140–450)
PLATELET # BLD AUTO: 156 10*3/MM3 (ref 140–450)
PMV BLD AUTO: 10.4 FL (ref 6–12)
PMV BLD AUTO: 9.7 FL (ref 6–12)
POTASSIUM SERPL-SCNC: 4.2 MMOL/L (ref 3.5–5.2)
PROT SERPL-MCNC: 5.9 G/DL (ref 6–8.5)
PROT SERPL-MCNC: 6.1 G/DL (ref 6–8.5)
RBC # BLD AUTO: 3.09 10*6/MM3 (ref 3.77–5.28)
RBC # BLD AUTO: 3.12 10*6/MM3 (ref 3.77–5.28)
SODIUM SERPL-SCNC: 133 MMOL/L (ref 136–145)
T4 FREE SERPL-MCNC: 0.68 NG/DL (ref 0.93–1.7)
TSH SERPL DL<=0.05 MIU/L-ACNC: 96.1 UIU/ML (ref 0.27–4.2)
WBC NRBC COR # BLD: 6.34 10*3/MM3 (ref 3.4–10.8)
WBC NRBC COR # BLD: 7.02 10*3/MM3 (ref 3.4–10.8)

## 2022-07-12 PROCEDURE — 97110 THERAPEUTIC EXERCISES: CPT

## 2022-07-12 PROCEDURE — 85025 COMPLETE CBC W/AUTO DIFF WBC: CPT | Performed by: PHYSICAL MEDICINE & REHABILITATION

## 2022-07-12 PROCEDURE — 82962 GLUCOSE BLOOD TEST: CPT

## 2022-07-12 PROCEDURE — 63710000001 INSULIN LISPRO (HUMAN) PER 5 UNITS: Performed by: HOSPITALIST

## 2022-07-12 PROCEDURE — 97535 SELF CARE MNGMENT TRAINING: CPT

## 2022-07-12 PROCEDURE — 80076 HEPATIC FUNCTION PANEL: CPT | Performed by: PHYSICAL MEDICINE & REHABILITATION

## 2022-07-12 RX ORDER — LEVOTHYROXINE SODIUM 0.1 MG/1
200 TABLET ORAL
Status: DISCONTINUED | OUTPATIENT
Start: 2022-07-13 | End: 2022-07-22 | Stop reason: HOSPADM

## 2022-07-12 RX ADMIN — MICONAZOLE NITRATE 1 APPLICATION: 2 CREAM TOPICAL at 22:05

## 2022-07-12 RX ADMIN — ZOLPIDEM TARTRATE 2.5 MG: 5 TABLET ORAL at 21:15

## 2022-07-12 RX ADMIN — OXYCODONE 5 MG: 5 TABLET ORAL at 21:19

## 2022-07-12 RX ADMIN — LIDOCAINE 1 PATCH: 50 PATCH CUTANEOUS at 08:06

## 2022-07-12 RX ADMIN — LEVOTHYROXINE SODIUM 150 MCG: 0.15 TABLET ORAL at 05:25

## 2022-07-12 RX ADMIN — ACETAMINOPHEN 500 MG: 500 TABLET, FILM COATED ORAL at 08:06

## 2022-07-12 RX ADMIN — CLONIDINE HYDROCHLORIDE 0.2 MG: 0.1 TABLET ORAL at 21:14

## 2022-07-12 RX ADMIN — GABAPENTIN 300 MG: 300 CAPSULE ORAL at 21:15

## 2022-07-12 RX ADMIN — OXYCODONE 5 MG: 5 TABLET ORAL at 17:26

## 2022-07-12 RX ADMIN — GABAPENTIN 300 MG: 300 CAPSULE ORAL at 08:07

## 2022-07-12 RX ADMIN — SODIUM ZIRCONIUM CYCLOSILICATE 5 G: 5 POWDER, FOR SUSPENSION ORAL at 08:07

## 2022-07-12 RX ADMIN — HYDRALAZINE HYDROCHLORIDE 100 MG: 50 TABLET, FILM COATED ORAL at 21:14

## 2022-07-12 RX ADMIN — INSULIN GLARGINE-YFGN 7 UNITS: 100 INJECTION, SOLUTION SUBCUTANEOUS at 08:03

## 2022-07-12 RX ADMIN — INSULIN LISPRO 2 UNITS: 100 INJECTION, SOLUTION INTRAVENOUS; SUBCUTANEOUS at 17:07

## 2022-07-12 RX ADMIN — OXYCODONE 5 MG: 5 TABLET ORAL at 05:25

## 2022-07-12 RX ADMIN — GABAPENTIN 100 MG: 100 CAPSULE ORAL at 14:03

## 2022-07-12 RX ADMIN — OXYCODONE 5 MG: 5 TABLET ORAL at 09:56

## 2022-07-12 RX ADMIN — HYDRALAZINE HYDROCHLORIDE 100 MG: 50 TABLET, FILM COATED ORAL at 14:03

## 2022-07-12 RX ADMIN — HYDRALAZINE HYDROCHLORIDE 100 MG: 50 TABLET, FILM COATED ORAL at 05:25

## 2022-07-12 RX ADMIN — LOSARTAN POTASSIUM 100 MG: 100 TABLET, FILM COATED ORAL at 08:06

## 2022-07-12 RX ADMIN — INSULIN LISPRO 2 UNITS: 100 INJECTION, SOLUTION INTRAVENOUS; SUBCUTANEOUS at 08:06

## 2022-07-12 RX ADMIN — CLONIDINE HYDROCHLORIDE 0.2 MG: 0.1 TABLET ORAL at 08:06

## 2022-07-12 RX ADMIN — INSULIN LISPRO 3 UNITS: 100 INJECTION, SOLUTION INTRAVENOUS; SUBCUTANEOUS at 12:05

## 2022-07-12 RX ADMIN — CARVEDILOL 25 MG: 25 TABLET, FILM COATED ORAL at 08:06

## 2022-07-12 RX ADMIN — TAMSULOSIN HYDROCHLORIDE 0.4 MG: 0.4 CAPSULE ORAL at 08:06

## 2022-07-12 RX ADMIN — ROPINIROLE HYDROCHLORIDE 0.75 MG: 0.5 TABLET, FILM COATED ORAL at 21:14

## 2022-07-12 RX ADMIN — Medication 1 TABLET: at 08:06

## 2022-07-12 RX ADMIN — ASPIRIN 81 MG: 81 TABLET, COATED ORAL at 08:06

## 2022-07-12 RX ADMIN — PANTOPRAZOLE SODIUM 40 MG: 40 TABLET, DELAYED RELEASE ORAL at 05:25

## 2022-07-12 RX ADMIN — SERTRALINE 150 MG: 100 TABLET, FILM COATED ORAL at 08:06

## 2022-07-12 NOTE — PROGRESS NOTES
TEAM CONF - July 12 - BED MOD. TRASNFER MIN, AT TIMES MIN-MOD. GAIT CTG RW 60 FEET. LAST WORKED ON STAIRS July 2 4 STAIRS MIN ASSIST. TOILET TRANSFERS MIN. UBB SBA. LBB MIN MOD. UBD SET UP. LBD MOD MAX. OXYCODONE AND GABAPENTIN FOR PAIN. LEFT HYDROURETERNEPHROSIS. - CATHETERIZE ONCE A DAY. ON FLOMAX. HAS A RASH ON BACK/BUTTOCK - MICONAZOLE CREAM. APPOINTMENT WITH  June - RHEUMATOLOGY - ON Friday July 15. CK = 39 YESTERDAY. THYROID RECHECK - TSH 96.1 AND FREE T4 0.68 YESTERDAY - INTERNAL MEDICINE FOLLOWING.   [facility] - LOOKING AT SUBACUTE OPTIONS.

## 2022-07-12 NOTE — PROGRESS NOTES
7-12-22  Urology  56 years old  ESRD  On dialysis  Urinary retention/incomplete emptying  Has seen Dr Fierro and has follow up with him arranged  Question concerns the incomplete emptying  Moderate UOP  IC has been used in the past  Continue IC once or twice daily (depending on daily UOP volume)  Follow up with Dr Fierro

## 2022-07-12 NOTE — PROGRESS NOTES
Discussed current status and d/c plans with patient at length today. Discussed options of going home vs sub acute facility. Patient stated she did not want to go to nursing home initially, but by end of discussion stated she was okay with going to [facility] since she had been there before. We did discuss other facilities that have in house dialysis and showed her pictures of these facilities from their web sites.Patient gave permission to call her daughter as well as  to discuss plans. Discussed patient's care needs and assistance needed with patient's daughter and , by phone. Discussed pros and cons of going to facility vs going home. Daughter not sure which would be best for patient and also for them. Daughter stated she can work from patient's home so could be with her during day while  at work. She is also willing to learn to do IC's if needed. Daughter is LPN. Daughter will plan to come tomorrow morning at 8:30 for teaching with OT and 9:30 with PT. Daughter has smaller SUV so will see if PT can practice transfer in/out of this with patient.  cannot come in for teaching as he has to get grandson enrolled in middle school tomorrow. But, he will be anxious to see what daughter thinks about how patient is doing.  stated patient told him she was not walking but uses wheelchair. He stated wheelchair will not work inside their home. Discussed patient is walking 60 feet with rolling walker. Will see how teaching goes with daughter and discuss see whether she feels they would be able to manage patient at home currently or if needs to go to sub acute facility. Will go ahead and call liaison with [facility] in the morning to check on bed availability and if would accept patient. Will assist with plans.

## 2022-07-12 NOTE — PROGRESS NOTES
"DAILY PROGRESS NOTE  [facility]    Patient Identification:  Name: Zaina Martinez  Age: 56 y.o.  Sex: female  :  1965  MRN: [id number]         Primary Care Physician: Karson Oreilly MD    Subjective:  Interval History:She complains of leg pain.  Better today.      Objective:    Scheduled Meds:aspirin, 81 mg, Oral, Daily  b complex-vitamin c-folic acid, 1 tablet, Oral, Daily  carvedilol, 25 mg, Oral, BID With Meals  cloNIDine, 0.2 mg, Oral, Q12H  epoetin brooke/brooke-epbx, 10,000 Units, Intravenous, Once per day on   gabapentin, 100 mg, Oral, Q24H  gabapentin, 300 mg, Oral, BID  hydrALAZINE, 100 mg, Oral, Q8H  insulin glargine, 7 Units, Subcutaneous, QAM  insulin lispro, 0-7 Units, Subcutaneous, TID AC  levothyroxine, 150 mcg, Oral, Q AM  lidocaine, 1 patch, Transdermal, Q24H  losartan, 100 mg, Oral, Q24H  miconazole, 1 application, Topical, Q12H  pantoprazole, 40 mg, Oral, Q AM  rOPINIRole, 0.75 mg, Oral, Nightly  sertraline, 150 mg, Oral, Daily  sodium zirconium cyclosilicate, 5 g, Oral, Daily  tamsulosin, 0.4 mg, Oral, Daily      Continuous Infusions:     Vital signs in last 24 hours:  Temp:  [97.5 °F (36.4 °C)-98.6 °F (37 °C)] 97.6 °F (36.4 °C)  Heart Rate:  [52-57] 52  Resp:  [18-20] 18  BP: (120-190)/(65-87) 120/65    Intake/Output:    Intake/Output Summary (Last 24 hours) at 2022 1541  Last data filed at 2022 0745  Gross per 24 hour   Intake 118 ml   Output 3500 ml   Net -3382 ml       Exam:  /65 (BP Location: Left arm, Patient Position: Sitting)   Pulse 52   Temp 97.6 °F (36.4 °C) (Oral)   Resp 18   Ht 157.5 cm (62.01\")   Wt 75.5 kg (166 lb 7.2 oz)   SpO2 96%   BMI 30.44 kg/m²     General Appearance:    Alert, cooperative, no distress   Head:    Normocephalic, without obvious abnormality, atraumatic   Eyes:       Throat:   Lips, tongue, gums normal   Neck:   Supple, symmetrical, trachea midline, no JVD   Lungs:     Clear to auscultation bilaterally, " respirations unlabored   Chest Wall:    No tenderness or deformity    Heart:    Regular rate and rhythm, S1 and S2 normal, no murmur,no  Rub or gallop   Abdomen:     Soft, nontender, bowel sounds active, no masses, no organomegaly    Extremities:   Extremities normal, atraumatic, no cyanosis, improving edema in lower extremities   Pulses:      Skin:   Skin is warm and dry,  no rashes or palpable lesions   Neurologic:   no focal deficits noted      Lab Results (last 72 hours)     Procedure Component Value Units Date/Time    POC Glucose Once [[id number]]  (Normal) Collected: 07/02/22 1719    Specimen: Blood Updated: 07/02/22 1721     Glucose 102 mg/dL      Comment: Meter: [id number] : [id number] Smooth Miranda RN       POC Glucose Once [[id number]]  (Abnormal) Collected: 07/02/22 1631    Specimen: Blood Updated: 07/02/22 1635     Glucose 41 mg/dL      Comment: RN Notified R and V Meter: [id number] : [id number] Smooth Miranda RN       POC Glucose Once [[id number]]  (Normal) Collected: 07/02/22 1151    Specimen: Blood Updated: 07/02/22 1152     Glucose 97 mg/dL      Comment: Meter: [id number] : [id number] Randolph SAAVEDRA       POC Glucose Once [[id number]]  (Normal) Collected: 07/02/22 1024    Specimen: Blood Updated: 07/02/22 1025     Glucose 120 mg/dL      Comment: Meter: [id number] : [id number] Smooth Miranda RN       Basic Metabolic Panel [[id number]]  (Abnormal) Collected: 07/02/22 0828    Specimen: Blood Updated: 07/02/22 0933     Glucose 65 mg/dL      BUN 40 mg/dL      Creatinine 2.50 mg/dL      Sodium 135 mmol/L      Potassium 5.3 mmol/L      Chloride 98 mmol/L      CO2 24.0 mmol/L      Calcium 7.6 mg/dL      BUN/Creatinine Ratio 16.0     Anion Gap 13.0 mmol/L      eGFR 22.1 mL/min/1.73      Comment: National Kidney Foundation and American Society of Nephrology (ASN) Task Force recommended calculation based on the Chronic Kidney Disease Epidemiology Collaboration (CKD-EPI) equation refit without  adjustment for race.       Narrative:      GFR Normal >60  Chronic Kidney Disease <60  Kidney Failure <15      Hepatic Function Panel [[id number]]  (Abnormal) Collected: 07/02/22 0828    Specimen: Blood Updated: 07/02/22 0931     Total Protein 5.5 g/dL      Albumin 2.80 g/dL      ALT (SGPT) 14 U/L      AST (SGOT) 28 U/L      Alkaline Phosphatase 380 U/L      Total Bilirubin 0.4 mg/dL      Bilirubin, Direct <0.2 mg/dL      Bilirubin, Indirect --     Comment: Unable to calculate       Phosphorus [[id number]]  (Abnormal) Collected: 07/02/22 0828    Specimen: Blood Updated: 07/02/22 0931     Phosphorus 4.6 mg/dL     CBC & Differential [[id number]]  (Abnormal) Collected: 07/02/22 0828    Specimen: Blood Updated: 07/02/22 0904    Narrative:      The following orders were created for panel order CBC & Differential.  Procedure                               Abnormality         Status                     ---------                               -----------         ------                     CBC Auto Differential[[id number]]        Abnormal            Final result                 Please view results for these tests on the individual orders.    CBC Auto Differential [[id number]]  (Abnormal) Collected: 07/02/22 0828    Specimen: Blood Updated: 07/02/22 0904     WBC 21.35 10*3/mm3      RBC 3.21 10*6/mm3      Hemoglobin 8.4 g/dL      Hematocrit 26.8 %      MCV 83.5 fL      MCH 26.2 pg      MCHC 31.3 g/dL      RDW 18.9 %      RDW-SD 56.6 fl      MPV 10.0 fL      Platelets 234 10*3/mm3      Neutrophil % 81.5 %      Lymphocyte % 10.6 %      Monocyte % 6.7 %      Eosinophil % 0.1 %      Basophil % 0.1 %      Immature Grans % 1.0 %      Neutrophils, Absolute 17.40 10*3/mm3      Lymphocytes, Absolute 2.27 10*3/mm3      Monocytes, Absolute 1.42 10*3/mm3      Eosinophils, Absolute 0.02 10*3/mm3      Basophils, Absolute 0.02 10*3/mm3      Immature Grans, Absolute 0.22 10*3/mm3      nRBC 0.0 /100 WBC     POC Glucose Once [[id number]]  (Normal)  Collected: 07/02/22 0614    Specimen: Blood Updated: 07/02/22 0615     Glucose 92 mg/dL      Comment: Meter: [id number] : [id number] [employer] NA       POC Glucose Once [[id number]]  (Abnormal) Collected: 07/02/22 0253    Specimen: Blood Updated: 07/02/22 0254     Glucose 140 mg/dL      Comment: Meter: [id number] : [id number] Kushal Choe RN       POC Glucose Once [[id number]]  (Abnormal) Collected: 07/01/22 2036    Specimen: Blood Updated: 07/01/22 2037     Glucose 139 mg/dL      Comment: Meter: [id number] : [id number] [employer] NA       Hepatic Function Panel [[id number]]  (Abnormal) Collected: 07/01/22 1827    Specimen: Blood Updated: 07/01/22 1926     Total Protein 6.0 g/dL      Albumin 3.20 g/dL      ALT (SGPT) 18 U/L      AST (SGOT) 33 U/L      Alkaline Phosphatase 450 U/L      Total Bilirubin 0.5 mg/dL      Bilirubin, Direct 0.2 mg/dL      Bilirubin, Indirect 0.3 mg/dL     Basic Metabolic Panel [[id number]]  (Abnormal) Collected: 07/01/22 1827    Specimen: Blood Updated: 07/01/22 1926     Glucose 75 mg/dL      BUN 34 mg/dL      Creatinine 2.21 mg/dL      Sodium 134 mmol/L      Potassium 5.4 mmol/L      Chloride 97 mmol/L      CO2 24.0 mmol/L      Calcium 7.9 mg/dL      BUN/Creatinine Ratio 15.4     Anion Gap 13.0 mmol/L      eGFR 25.6 mL/min/1.73      Comment: National Kidney Foundation and American Society of Nephrology (ASN) Task Force recommended calculation based on the Chronic Kidney Disease Epidemiology Collaboration (CKD-EPI) equation refit without adjustment for race.       Narrative:      GFR Normal >60  Chronic Kidney Disease <60  Kidney Failure <15      Phosphorus [[id number]]  (Normal) Collected: 07/01/22 1827    Specimen: Blood Updated: 07/01/22 1926     Phosphorus 3.2 mg/dL     CBC & Differential [[id number]]  (Abnormal) Collected: 07/01/22 1827    Specimen: Blood Updated: 07/01/22 1909    Narrative:      The following orders were created for panel order CBC &  Differential.  Procedure                               Abnormality         Status                     ---------                               -----------         ------                     CBC Auto Differential[[id number]]        Abnormal            Final result                 Please view results for these tests on the individual orders.    CBC Auto Differential [[id number]]  (Abnormal) Collected: 07/01/22 1827    Specimen: Blood Updated: 07/01/22 1909     WBC 24.21 10*3/mm3      RBC 3.60 10*6/mm3      Hemoglobin 9.4 g/dL      Hematocrit 30.0 %      MCV 83.3 fL      MCH 26.1 pg      MCHC 31.3 g/dL      RDW 19.0 %      RDW-SD 56.4 fl      MPV 10.3 fL      Platelets 288 10*3/mm3      Neutrophil % 94.6 %      Lymphocyte % 2.7 %      Monocyte % 1.4 %      Eosinophil % 0.0 %      Basophil % 0.1 %      Immature Grans % 1.2 %      Neutrophils, Absolute 22.89 10*3/mm3      Lymphocytes, Absolute 0.65 10*3/mm3      Monocytes, Absolute 0.34 10*3/mm3      Eosinophils, Absolute 0.00 10*3/mm3      Basophils, Absolute 0.03 10*3/mm3      Immature Grans, Absolute 0.30 10*3/mm3      nRBC 0.0 /100 WBC     POC Glucose Once [[id number]]  (Abnormal) Collected: 07/01/22 1606    Specimen: Blood Updated: 07/01/22 1607     Glucose 287 mg/dL      Comment: Meter: [id number] : [id number] Dierken Jessica L RN       POC Glucose Once [[id number]]  (Normal) Collected: 07/01/22 1246    Specimen: Blood Updated: 07/01/22 1247     Glucose 126 mg/dL      Comment: Meter: [id number] : [id number] Dierken Jessica L RN       POC Glucose Once [[id number]]  (Normal) Collected: 07/01/22 0639    Specimen: Blood Updated: 07/01/22 0641     Glucose 95 mg/dL      Comment: Meter: [id number] : [id number] [employer] CNA       POC Glucose Once [[id number]]  (Abnormal) Collected: 07/01/22 0208    Specimen: Blood Updated: 07/01/22 0210     Glucose 186 mg/dL      Comment: Meter: [id number] : [id number] Jennie Gilliam CNPRANAY       POC Glucose Once [[id number]]   (Abnormal) Collected: 06/30/22 2032    Specimen: Blood Updated: 06/30/22 2034     Glucose 251 mg/dL      Comment: Meter: [id number] : [id number] Jennie Gilliam CNA       POC Glucose Once [[id number]]  (Abnormal) Collected: 06/30/22 1615    Specimen: Blood Updated: 06/30/22 1616     Glucose 197 mg/dL      Comment: Meter: [id number] : [id number] Makharadze Firuza NA       POC Glucose Once [[id number]]  (Normal) Collected: 06/30/22 1149    Specimen: Blood Updated: 06/30/22 1201     Glucose 100 mg/dL      Comment: Meter: [id number] : [id number] Makharadze Firuza NA       POC Glucose Once [[id number]]  (Abnormal) Collected: 06/30/22 1115    Specimen: Blood Updated: 06/30/22 1117     Glucose 68 mg/dL      Comment: Meter: [id number] : [id number] Makharadze Firuza NA       POC Glucose Once [[id number]]  (Abnormal) Collected: 06/30/22 1100    Specimen: Blood Updated: 06/30/22 1102     Glucose 55 mg/dL      Comment: Meter: [id number] : [id number] Makharadze Firuza NA       POC Glucose Once [[id number]]  (Abnormal) Collected: 06/30/22 1045    Specimen: Blood Updated: 06/30/22 1045     Glucose 55 mg/dL      Comment: Meter: [id number] : [id number] Edson MCELROY RN       Basic Metabolic Panel [[id number]]  (Abnormal) Collected: 06/30/22 0912    Specimen: Blood Updated: 06/30/22 1041     Glucose 91 mg/dL      BUN 43 mg/dL      Creatinine 2.61 mg/dL      Sodium 134 mmol/L      Potassium 5.3 mmol/L      Chloride 100 mmol/L      CO2 23.0 mmol/L      Calcium 7.6 mg/dL      BUN/Creatinine Ratio 16.5     Anion Gap 11.0 mmol/L      eGFR 21.0 mL/min/1.73      Comment: National Kidney Foundation and American Society of Nephrology (ASN) Task Force recommended calculation based on the Chronic Kidney Disease Epidemiology Collaboration (CKD-EPI) equation refit without adjustment for race.       Narrative:      GFR Normal >60  Chronic Kidney Disease <60  Kidney Failure <15      POC Glucose Once [[id number]]  (Abnormal)  Collected: 06/30/22 1034    Specimen: Blood Updated: 06/30/22 1040     Glucose 43 mg/dL      Comment: Result Not Confirmed Meter: [id number] : [id number] Edson Lopez NAVI REYES       Hepatic Function Panel [[id number]]  (Abnormal) Collected: 06/30/22 0912    Specimen: Blood Updated: 06/30/22 1031     Total Protein 5.9 g/dL      Albumin 2.60 g/dL      ALT (SGPT) 15 U/L      AST (SGOT) 30 U/L      Alkaline Phosphatase 426 U/L      Total Bilirubin 0.4 mg/dL      Bilirubin, Direct 0.3 mg/dL      Bilirubin, Indirect 0.1 mg/dL     Phosphorus [[id number]]  (Normal) Collected: 06/30/22 0912    Specimen: Blood Updated: 06/30/22 1031     Phosphorus 4.0 mg/dL     CBC & Differential [[id number]]  (Abnormal) Collected: 06/30/22 0912    Specimen: Blood Updated: 06/30/22 1010    Narrative:      The following orders were created for panel order CBC & Differential.  Procedure                               Abnormality         Status                     ---------                               -----------         ------                     CBC Auto Differential[[id number]]        Abnormal            Final result                 Please view results for these tests on the individual orders.    CBC Auto Differential [[id number]]  (Abnormal) Collected: 06/30/22 0912    Specimen: Blood Updated: 06/30/22 1010     WBC 19.91 10*3/mm3      RBC 3.25 10*6/mm3      Hemoglobin 8.5 g/dL      Hematocrit 27.2 %      MCV 83.7 fL      MCH 26.2 pg      MCHC 31.3 g/dL      RDW 18.1 %      RDW-SD 53.9 fl      MPV 10.0 fL      Platelets 315 10*3/mm3      Neutrophil % 81.8 %      Lymphocyte % 11.2 %      Monocyte % 5.4 %      Eosinophil % 0.2 %      Basophil % 0.1 %      Immature Grans % 1.3 %      Neutrophils, Absolute 16.30 10*3/mm3      Lymphocytes, Absolute 2.23 10*3/mm3      Monocytes, Absolute 1.07 10*3/mm3      Eosinophils, Absolute 0.03 10*3/mm3      Basophils, Absolute 0.02 10*3/mm3      Immature Grans, Absolute 0.26 10*3/mm3      nRBC 0.1 /100 WBC      POC Glucose Once [[id number]]  (Normal) Collected: 06/30/22 0631    Specimen: Blood Updated: 06/30/22 0633     Glucose 117 mg/dL      Comment: Meter: [id number] : [id number] Smyzer Inge NA       POC Glucose Once [[id number]]  (Abnormal) Collected: 06/30/22 0310    Specimen: Blood Updated: 06/30/22 0311     Glucose 181 mg/dL      Comment: Meter: [id number] : [id number] Smyzer Inge NA       POC Glucose Once [[id number]]  (Abnormal) Collected: 06/29/22 2046    Specimen: Blood Updated: 06/29/22 2048     Glucose 219 mg/dL      Comment: Meter: [id number] : [id number] Smyzer Inge NA       POC Glucose Once [[id number]]  (Normal) Collected: 06/29/22 1901    Specimen: Blood Updated: 06/29/22 1902     Glucose 101 mg/dL      Comment: Meter: [id number] : [id number] Cricket Harley NA       POC Glucose Once [[id number]]  (Abnormal) Collected: 06/29/22 1842    Specimen: Blood Updated: 06/29/22 1844     Glucose 56 mg/dL      Comment: Meter: [id number] : [id number] Anup SAAVEDRA           Data Review:  Results from last 7 days   Lab Units 07/11/22  2325 07/10/22  0503 07/09/22  0710   SODIUM mmol/L 133* 133* 131*   POTASSIUM mmol/L 4.2 4.4 3.8   CHLORIDE mmol/L 99 96* 96*   CO2 mmol/L 24.0 24.0 24.6   BUN mg/dL 22* 34* 22*   CREATININE mg/dL 2.31* 3.17* 2.17*   GLUCOSE mg/dL 202* 156* 167*   CALCIUM mg/dL 8.3* 7.8* 7.6*     Results from last 7 days   Lab Units 07/12/22  1052 07/11/22  2325 07/10/22  0503   WBC 10*3/mm3 6.34 7.02 8.20   HEMOGLOBIN g/dL 8.0* 8.3* 7.9*   HEMATOCRIT % 25.2* 25.9* 24.9*   PLATELETS 10*3/mm3 149 156 170     Results from last 7 days   Lab Units 07/11/22  2325   TSH uIU/mL 96.100*   FREE T4 ng/dL 0.68*         Lab Results   Lab Value Date/Time    TROPONINT 0.092 (C) 12/19/2021 1314    TROPONINT 0.117 (C) 11/30/2021 0153    TROPONINT 0.132 (C) 11/29/2021 2121         Results from last 7 days   Lab Units 07/12/22  1052 07/11/22  2325 07/10/22  0503   ALK PHOS U/L 551*  571* 572*   BILIRUBIN mg/dL 0.5 0.5 0.4   BILIRUBIN DIRECT mg/dL 0.3 0.3 0.2   ALT (SGPT) U/L 18 18 18   AST (SGOT) U/L 31 34* 32     Results from last 7 days   Lab Units 07/11/22  2325   TSH uIU/mL 96.100*   FREE T4 ng/dL 0.68*         Glucose   Date/Time Value Ref Range Status   07/12/2022 1139 229 (H) 70 - 130 mg/dL Final     Comment:     Meter: [id number] : [id number] Anup Martin QUENTIN   07/12/2022 0629 196 (H) 70 - 130 mg/dL Final     Comment:     Meter: [id number] : [id number] Schneiderens Magdalenokel [facility]   07/11/2022 2215 202 (H) 70 - 130 mg/dL Final     Comment:     Meter: [id number] : [id number] Schneider Tawana [facility]   07/11/2022 1933 119 70 - 130 mg/dL Final     Comment:     Meter: [id number] : [id number] Catrachita Holyl [facility]   07/11/2022 1213 167 (H) 70 - 130 mg/dL Final     Comment:     Meter: [id number] : [id number] Anupriley Hortondonte SAAVEDRA   07/11/2022 0636 266 (H) 70 - 130 mg/dL Final     Comment:     Meter: [id number] : [id number] Catrachita Holly [facility]   07/10/2022 2159 172 (H) 70 - 130 mg/dL Final     Comment:     Meter: [id number] : [id number] Lorena Valderrama RN   07/10/2022 1621 134 (H) 70 - 130 mg/dL Final     Comment:     Meter: [id number] : [id number] Carlton SAAVEDRA           Past Medical History:   Diagnosis Date   • Acute CVA (cerebrovascular accident) (HCC) 5/1/2022   • Acute on chronic diastolic CHF (congestive heart failure) (HCC)    • CAD (coronary artery disease) 12/20/2021   • Diabetes (HCC)    • Disease of thyroid gland    • GERD (gastroesophageal reflux disease)    • Hyperlipidemia 11/30/2018   • Hypertension    • Rheumatoid arthritis (HCC)        Assessment:  Active Hospital Problems    Diagnosis  POA   • 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       Plan:  Insulin same dose.  Continue same and monitor.  The trend is better.  Pain better.   HD per renal. Repeat of CT scans  and MRI of left leg. Venous duplex negative. Not showing anything new.  Discussed with Dr Florentino.  Some studies still pending on muscle biopsy. Also should get PET scan as outpatient. Encouraged her to participate in therapy.  DC planning.  She is planning to go to [facility]. Rheumatology appointment as outpatient. Increase synthroid to 200mcg and advised her to take first thing in am with water and wait 30 before taking any other medicine or eating or drinking any other items.    Aris Isbell MD  7/12/2022  15:41 EDT

## 2022-07-12 NOTE — THERAPY TREATMENT NOTE
Inpatient Rehabilitation - Occupational Therapy Treatment Note    [facility]     Patient Name: Zaina Martinez  : 1965  MRN: [id number]    Today's Date: 2022                 Admit Date: 2022         ICD-10-CM ICD-9-CM   1. Generalized weakness  R53.1 780.79   2. Diabetic muscle infarction ([facility])  E11.69 250.80    M62.20 728.89   3. Other insomnia  G47.09 780.52       Patient Active Problem List   Diagnosis   • Renal insufficiency   • Hypertensive disorder   • Hypothyroidism   • Type 2 diabetes mellitus with kidney complication, with long-term current use of insulin ([facility])   • Rheumatoid arthritis ([facility])   • Angioedema   • Esophageal dysmotility   • Anemia   • Medically noncompliant   • Myocardial infarction due to demand ischemia ([facility])   • Enteritis   • PRES (posterior reversible encephalopathy syndrome)   • Urine retention   • Klebsiella infection   • Superficial thrombophlebitis   • Generalized weakness   • ESRD (end stage renal disease) ([facility])   • CAD (coronary artery disease)   • Abnormal urinalysis   • Chronic diastolic CHF (congestive heart failure) ([facility])   • Pyelonephritis   • Calculus of gallbladder with acute on chronic cholecystitis without obstruction   • Pleural effusion on right   • Anemia due to chronic kidney disease, on chronic dialysis ([facility])   • Abnormal findings on diagnostic imaging of other specified body structures   • Acute upper respiratory infection   • Agitation   • Alkaline phosphatase raised   • Casts present in urine   • Cellulitis of toe   • Hip pain   • Community acquired pneumonia   • Depressive disorder   • Diarrhea of presumed infectious origin   • Difficult or painful urination   • Disease due to severe acute respiratory syndrome coronavirus 2 (SARS-CoV-2)   • Dyspnea   • Encounter for follow-up examination after completed treatment for conditions other than malignant neoplasm   • H/O: hypothyroidism   • Hyperlipidemia   • Hypomagnesemia   • Intractable vomiting with  nausea   • Leukocytosis   • Luetscher's syndrome   • Need for influenza vaccination   • Restless legs   • Noncompliance with treatment   • Shoulder pain   • Urinary tract infectious disease   • Metabolic encephalopathy   • Abnormal findings on diagnostic imaging of abdomen   • Status post cholecystectomy   • Hyponatremia   • Leukocytosis   • Acute metabolic encephalopathy   • Encephalopathy, toxic   • Acute CVA (cerebrovascular accident) (HCC)   • Intracranial hemorrhage (HCC)   • Stroke (HCC)   • Abnormal urinalysis   • Diabetic muscle infarction (HCC)   • Other insomnia       Past Medical History:   Diagnosis Date   • Acute CVA (cerebrovascular accident) (HCC) 5/1/2022   • Acute on chronic diastolic CHF (congestive heart failure) (HCC)    • CAD (coronary artery disease) 12/20/2021   • Diabetes (HCC)    • Disease of thyroid gland    • GERD (gastroesophageal reflux disease)    • Hyperlipidemia 11/30/2018   • Hypertension    • Rheumatoid arthritis (HCC)        Past Surgical History:   Procedure Laterality Date   • CHOLECYSTECTOMY WITH INTRAOPERATIVE CHOLANGIOGRAM N/A 1/10/2022    Procedure: Laparoscopic cholecystectomy with intraoperative cholangiogram;  Surgeon: Ramana Raygoza MD;  Location: [facility];  Service: General;  Laterality: N/A;   • EYE SURGERY     • HYSTERECTOMY     • INSERTION HEMODIALYSIS CATHETER N/A 12/6/2021    Procedure: HEMODIALYSIS CATHETER INSERTION;  Surgeon: Keli Salazar MD;  Location: [facility] 18/19;  Service: Vascular;  Laterality: N/A;   • INSERTION HEMODIALYSIS CATHETER N/A 5/3/2022    Procedure: TUNNELED CATHETER PLACEMENT;  Surgeon: Keli Salazar MD;  Location: [facility] OR;  Service: Vascular;  Laterality: N/A;   • MUSCLE BIOPSY Left 6/15/2022    Procedure: Left quadriceps muscle biopsy;  Surgeon: Marques Ma MD;  Location: [facility] OR;  Service: Neurosurgery;  Laterality: Left;             IRF OT ASSESSMENT FLOWSHEET (last 12 hours)     IRF OT  Evaluation and Treatment     Row Name 07/12/22 1212          OT Time and Intention    Document Type daily treatment  -AF     Mode of Treatment occupational therapy  -AF     Row Name 07/12/22 1212          General Information    Patient/Family/Caregiver Comments/Observations pt sitting up in bed, agreeable to shower  -AF     Existing Precautions/Restrictions fall  contact precautions  -AF     Row Name 07/12/22 1212          Pain Assessment    Pretreatment Pain Rating 4/10  -AF     Posttreatment Pain Rating 7/10  -AF     Pain Location - Side/Orientation Left  -AF     Pain Location - groin  thigh  -AF     Row Name 07/12/22 1212          Cognition/Psychosocial    Affect/Mental Status (Cognition) flat/blunted affect  -AF     Orientation Status (Cognition) oriented x 3  -AF     Follows Commands (Cognition) follows one-step commands  -AF     Personal Safety Interventions fall prevention program maintained;gait belt;nonskid shoes/slippers when out of bed  -AF     Row Name 07/12/22 1212          Bathing    [place] Level (Bathing) bathing skills;upper body;standby assist;lower body;minimum assist (75% patient effort);contact guard assist  -AF     Assistive Device (Bathing) grab bar/tub rail;hand held shower spray hose;tub bench  -AF     Position (Bathing) supported sitting;supported standing  -AF     Row Name 07/12/22 1212          Upper Body Dressing    [place] Level (Upper Body Dressing) upper body dressing skills;set up assistance  -AF     Position (Upper Body Dressing) supported sitting  -AF     Comment (Upper Body Dressing) seated on TTB  -AF     Row Name 07/12/22 1212          Lower Body Dressing    [place] Level (Lower Body Dressing) doff;don;pants/bottoms;shoes/slippers;dependent (less than 25% patient effort);nonverbal cues (demo/gesture);verbal cues  -AF     Position (Lower Body Dressing) supported standing;supported sitting  -AF     Comment (Lower Body Dressing) w/c level  -AF     Row Name 07/12/22  1212          Grooming    [place] Level (Grooming) grooming skills;verbal cues;deodorant application;hair care, combing/brushing;oral care regimen;set up  -AF     Position (Grooming) supported sitting  -AF     Comment (Grooming) w/c level  -AF     Row Name 07/12/22 1212          Toileting    Comment (Toileting) required brief change  -AF     Row Name 07/12/22 1212          Bed Mobility    Supine-Sit [place] (Bed Mobility) standby assist  increased time and vc's  -AF     Row Name 07/12/22 1212          Transfer Assessment/Treatment    Comment, (Transfers) sit to stand with grab bars in shower MIN A  -AF     Row Name 07/12/22 1212          Transfers    Bed-Chair [place] (Transfers) minimum assist (75% patient effort);verbal cues  -AF     Assistive Device (Bed-Chair Transfers) wheelchair  -AF     [place] Level (Shower Transfer) minimum assist (75% patient effort);verbal cues  -AF     Assistive Device (Shower Transfer) grab bar, tub/shower;shower chair;wheelchair  -AF     Row Name 07/12/22 1212          Bed-Chair Transfer    Comment, (Bed-Chair Transfer) increased time  -AF     Row Name 07/12/22 1212          Shower Transfer    Type (Shower Transfer) stand pivot/stand step  -AF     Comment, (Shower Transfer) with increased time  -AF     Row Name 07/12/22 1212          Motor Skills    Functional Endurance fair - with ADL tasks in shower states feeling very tired after shower  -AF     Row Name 07/12/22 1212          Balance    Static Sitting Balance independent  -AF     Static Standing Balance contact guard  with use of grab bars  -AF     Row Name 07/12/22 1212          Positioning and Restraints    Pre-Treatment Position in bed  -AF     Post Treatment Position wheelchair  -AF     In Wheelchair sitting;call light within reach;encouraged to call for assist;exit alarm on  -AF           User Key  (r) = Recorded By, (t) = Taken By, (c) = Cosigned By    Initials Name Effective Dates    AF Tasha Helm,  OTR 06/16/21 -                  Occupational Therapy Education                 Title: PT OT SLP Therapies (In Progress)     Topic: Occupational Therapy (Done)     Point: ADL training (Done)     Description:   Instruct learner(s) on proper safety adaptation and remediation techniques during self care or transfers.   Instruct in proper use of assistive devices.              Learning Progress Summary           Patient Acceptance, E,TB,D, VU,NR by  at 6/25/2022 1434      Show all documentation for this point (2)                 Point: Home exercise program (Done)     Description:   Instruct learner(s) on appropriate technique for monitoring, assisting and/or progressing therapeutic exercises/activities.              Learning Progress Summary           Patient Acceptance, E,D,H, VU by  at 6/30/2022 1544    Comment: review of HEP with hand weights and theraband      Show all documentation for this point (2)                 Point: Precautions (Done)     Description:   Instruct learner(s) on prescribed precautions during self-care and functional transfers.              Learning Progress Summary           Patient Acceptance, E,TB,D, VU,NR by  at 6/25/2022 1434      Show all documentation for this point (2)                 Point: Body mechanics (Done)     Description:   Instruct learner(s) on proper positioning and spine alignment during self-care, functional mobility activities and/or exercises.              Learning Progress Summary           Patient Acceptance, E,TB,D, VU,NR by  at 6/25/2022 1434      Show all documentation for this point (2)                             User Key     Initials Effective Dates Name Provider Type Discipline     06/16/21 -  Fany Lima PT Physical Therapist PT    AF 06/16/21 -  Tasha Helm OTR Occupational Therapist OT                    OT Recommendation and Plan                         Time Calculation:      Time Calculation- OT     Row Name 07/12/22 1216             Time  Calculation- OT    OT Start Time 0830  -AF      OT Stop Time 0930  -AF      OT Time Calculation (min) 60 min  -AF            User Key  (r) = Recorded By, (t) = Taken By, (c) = Cosigned By    Initials Name Provider Type    Tasha Metz, OTR Occupational Therapist              Therapy Charges for Today     Code Description Service Date Service Provider Modifiers Qty    10175658259 HC OT THER PROC EA 15 MIN 7/11/2022 Tasha Helm OTR GO 2    [id number]  OT SELF CARE/MGMT/TRAIN EA 15 MIN 7/12/2022 Tasha Helm OTR GO 4                   RAMO Mike  7/12/2022

## 2022-07-12 NOTE — THERAPY TREATMENT NOTE
Inpatient Rehabilitation - Physical Therapy Treatment Note       [facility]     Patient Name: Zaina Martinez  : 1965  MRN: [id number]    Today's Date: 2022                    Admit Date: 2022      Visit Dx:     ICD-10-CM ICD-9-CM   1. Generalized weakness  R53.1 780.79   2. Diabetic muscle infarction ([facility])  E11.69 250.80    M62.20 728.89   3. Other insomnia  G47.09 780.52       Patient Active Problem List   Diagnosis   • Renal insufficiency   • Hypertensive disorder   • Hypothyroidism   • Type 2 diabetes mellitus with kidney complication, with long-term current use of insulin ([facility])   • Rheumatoid arthritis ([facility])   • Angioedema   • Esophageal dysmotility   • Anemia   • Medically noncompliant   • Myocardial infarction due to demand ischemia ([facility])   • Enteritis   • PRES (posterior reversible encephalopathy syndrome)   • Urine retention   • Klebsiella infection   • Superficial thrombophlebitis   • Generalized weakness   • ESRD (end stage renal disease) ([facility])   • CAD (coronary artery disease)   • Abnormal urinalysis   • Chronic diastolic CHF (congestive heart failure) ([facility])   • Pyelonephritis   • Calculus of gallbladder with acute on chronic cholecystitis without obstruction   • Pleural effusion on right   • Anemia due to chronic kidney disease, on chronic dialysis ([facility])   • Abnormal findings on diagnostic imaging of other specified body structures   • Acute upper respiratory infection   • Agitation   • Alkaline phosphatase raised   • Casts present in urine   • Cellulitis of toe   • Hip pain   • Community acquired pneumonia   • Depressive disorder   • Diarrhea of presumed infectious origin   • Difficult or painful urination   • Disease due to severe acute respiratory syndrome coronavirus 2 (SARS-CoV-2)   • Dyspnea   • Encounter for follow-up examination after completed treatment for conditions other than malignant neoplasm   • H/O: hypothyroidism   • Hyperlipidemia   • Hypomagnesemia   • Intractable  vomiting with nausea   • Leukocytosis   • Luetscher's syndrome   • Need for influenza vaccination   • Restless legs   • Noncompliance with treatment   • Shoulder pain   • Urinary tract infectious disease   • Metabolic encephalopathy   • Abnormal findings on diagnostic imaging of abdomen   • Status post cholecystectomy   • Hyponatremia   • Leukocytosis   • Acute metabolic encephalopathy   • Encephalopathy, toxic   • Acute CVA (cerebrovascular accident) (HCC)   • Intracranial hemorrhage (HCC)   • Stroke (HCC)   • Abnormal urinalysis   • Diabetic muscle infarction (HCC)   • Other insomnia       Past Medical History:   Diagnosis Date   • Acute CVA (cerebrovascular accident) (HCC) 5/1/2022   • Acute on chronic diastolic CHF (congestive heart failure) (HCC)    • CAD (coronary artery disease) 12/20/2021   • Diabetes (HCC)    • Disease of thyroid gland    • GERD (gastroesophageal reflux disease)    • Hyperlipidemia 11/30/2018   • Hypertension    • Rheumatoid arthritis (HCC)        Past Surgical History:   Procedure Laterality Date   • CHOLECYSTECTOMY WITH INTRAOPERATIVE CHOLANGIOGRAM N/A 1/10/2022    Procedure: Laparoscopic cholecystectomy with intraoperative cholangiogram;  Surgeon: Ramana Raygoza MD;  Location: [facility];  Service: General;  Laterality: N/A;   • EYE SURGERY     • HYSTERECTOMY     • INSERTION HEMODIALYSIS CATHETER N/A 12/6/2021    Procedure: HEMODIALYSIS CATHETER INSERTION;  Surgeon: Keli Salazar MD;  Location: [facility] 18/19;  Service: Vascular;  Laterality: N/A;   • INSERTION HEMODIALYSIS CATHETER N/A 5/3/2022    Procedure: TUNNELED CATHETER PLACEMENT;  Surgeon: Keli Salazar MD;  Location: [facility];  Service: Vascular;  Laterality: N/A;   • MUSCLE BIOPSY Left 6/15/2022    Procedure: Left quadriceps muscle biopsy;  Surgeon: Marques Ma MD;  Location: [facility];  Service: Neurosurgery;  Laterality: Left;       PT ASSESSMENT (last 12 hours)     IRF PT  Evaluation and Treatment     Row Name 07/12/22 1035          PT Time and Intention    Document Type daily treatment  -LB     Mode of Treatment physical therapy  -LB     Patient/Family/Caregiver Comments/Observations pnt seated in w/c. Pleasant and cooperative.  -LB     Row Name 07/12/22 1035          General Information    Existing Precautions/Restrictions fall;other (see comments)  contact precautions  -LB     Row Name 07/12/22 1035          Pain Assessment    Pretreatment Pain Rating 7/10  -LB     Pain Location - Side/Orientation Left  -LB     Pain Location - groin;other (see comments)  thigh  -LB     Row Name 07/12/22 1035          Bed Mobility    Comment, (Bed Mobility) NT. Up in W/C  -LB     Row Name 07/12/22 1035          Transfers    Sit-Stand [place] (Transfers) minimum assist (75% patient effort)  -LB     Stand-Sit [place] (Transfers) minimum assist (75% patient effort);contact guard  -LB     Row Name 07/12/22 1035          Sit-Stand Transfer    Assistive Device (Sit-Stand Transfers) walker, front-wheeled;wheelchair  -LB     Row Name 07/12/22 1035          Stand-Sit Transfer    Assistive Device (Stand-Sit Transfers) walker, front-wheeled;wheelchair  -LB     Row Name 07/12/22 1035          Car Transfer    Comment, (Car Transfer) declined  -LB     Row Name 07/12/22 1035          Gait/Stairs (Locomotion)    [place] Level (Gait) contact guard;minimum assist (75% patient effort);1 person to manage equipment  -LB     Assistive Device (Gait) walker, front-wheeled  -LB     Distance in Feet (Gait) 40', 75' in AM. In PM: 60'. 75'  -LB     Pattern (Gait) step-to;step-through  -LB     Deviations/Abnormal Patterns (Gait) antalgic;gait speed decreased;kenn decreased;stride length decreased;weight shifting decreased  -LB     Bilateral Gait Deviations heel strike decreased;forward flexed posture  -LB     Row Name 07/12/22 1035          Curb Negotiation (Mobility)    [place], Curb Negotiation minimal  "assist, 75% or more patient effort;verbal cues  -LB     Assistive Device (Curb Negotiation) walker, front-wheeled  -LB     Comment, Curb Negotiation (Mobility) 2\" curb  -LB     Row Name 07/12/22 1035          Hip (Therapeutic Exercise)    Hip Isometrics (Therapeutic Exercise) bilateral;gluteal sets;aDduction;sitting;5 second hold  15 reps  -LB     Row Name 07/12/22 1035          Knee (Therapeutic Exercise)    Knee AROM (Therapeutic Exercise) bilateral;LAQ (long arc quad);sitting;15 repititions  L lnee flex  -LB     Knee Isometrics (Therapeutic Exercise) bilateral;quad sets;sitting;5 second hold  15 reps  -LB     Knee Strengthening (Therapeutic Exercise) right;flexion;sitting;resistance band;yellow;15 repititions  -LB     Row Name 07/12/22 1035          Ankle (Therapeutic Exercise)    Ankle AROM (Therapeutic Exercise) bilateral;dorsiflexion;plantarflexion;sitting;15 repititions  -LB     Row Name 07/12/22 1035          Positioning and Restraints    Post Treatment Position wheelchair  -LB     In Wheelchair sitting;call light within reach;exit alarm on  -LB     Row Name 07/12/22 1035          Weekly Progress Summary (PT)    Weekly Progress Summary (PT) Pnt continues to have issues w pain to L groin and L thigh. Declined PT at times during the past week.However she has seemed to tolerated PT a bit better so far this week. Pnt would benefit from continued PT to work toward functional mobility goals.  -LB     Row Name 07/12/22 1035          Bed Mobility Goal 1 (PT-IRF)    Activity/Assistive Device (Bed Mobility Goal 1, PT-IRF) bed mobility activities, all  -LB     [place] Level (Bed Mobility Goal 1, PT-IRF) contact guard required;standby assist  -LB     Time Frame (Bed Mobility Goal 1, PT-IRF) 1 week  -LB     Progress/Outcomes (Bed Mobility Goal 1, PT-IRF) goal ongoing  -LB     Row Name 07/12/22 1035          Transfer Goal 1 (PT-IRF)    Activity/Assistive Device (Transfer Goal 1, PT-IRF) bed-to-chair/chair-to-bed  -LB "     [place] Level (Transfer Goal 1, PT-IRF) contact guard required  -LB     Time Frame (Transfer Goal 1, PT-IRF) 1 week  -LB     Progress/Outcomes (Transfer Goal 1, PT-IRF) goal ongoing  -LB     Row Name 07/12/22 1035          Transfer Goal 2 (PT-IRF)    Activity/Assistive Device (Transfer Goal 2, PT-IRF) car transfer  -LB     [place] Level (Transfer Goal 2, PT-IRF) minimum assist (75% or more patient effort)  -LB     Time Frame (Transfer Goal 2, PT-IRF) 1 week  -LB     Progress/Outcomes (Transfer Goal 2, PT-IRF) goal ongoing  -LB     Row Name 07/12/22 1035          Gait/Walking Locomotion Goal 1 (PT-IRF)    Activity/Assistive Device (Gait/Walking Locomotion Goal 1, PT-IRF) gait (walking locomotion);walker, rolling  -LB     Gait/Walking Locomotion Distance Goal 1 (PT-IRF) 160'  -LB     [place] Level (Gait/Walking Locomotion Goal 1, PT-IRF) contact guard required  -LB     Time Frame (Gait/Walking Locomotion Goal 1, PT-IRF) 1 week  -LB     Progress/Outcomes (Gait/Walking Locomotion Goal 1, PT-IRF) goal ongoing  -LB     Row Name 07/12/22 1035          Stairs Goal 1 (PT-IRF)    Activity/Assistive Device (Stairs Goal 1, PT-IRF) ascending stairs;descending stairs;using handrail, right  -LB     Number of Stairs (Stairs Goal 1, PT-IRF) 4  -LB     [place] Level (Stairs Goal 1, PT-IRF) minimum assist (75% or more patient effort)  -LB     Time Frame (Stairs Goal 1, PT-IRF) 1 week  -LB     Progress/Outcomes (Stairs Goal 1, PT-IRF) goal ongoing  goal not reassessed in several days, as she has had issues w pain.  -LB           User Key  (r) = Recorded By, (t) = Taken By, (c) = Cosigned By    Initials Name Provider Type    LB Liz Rodriguez PTA Physical Therapist Assistant              Rash Left lower thoracic spine (Active)   Distribution localized 07/12/22 0806   Color pink 07/12/22 0806   Configuration/Shape round 07/12/22 0806   Borders irregular 07/11/22 1937   Characteristics dry 07/12/22 0806    Care, Rash other (see comments) 07/12/22 0806       Wound 06/15/22 1312 Left anterior thigh Incision (Active)   Dressing Appearance open to air 07/12/22 0806   Closure Liquid skin adhesive 07/12/22 0806   Base clean;dry 07/12/22 0806   Drainage Amount none 07/12/22 0806     Physical Therapy Education                 Title: PT OT SLP Therapies (In Progress)     Topic: Physical Therapy (In Progress)     Point: Mobility training (In Progress)     Learning Progress Summary           Patient Acceptance, E, NR by LB at 7/12/2022 1243   Significant Other Refuses, E, NR by KP at 7/4/2022 1245    Comment:  did not show for teaching      Show all documentation for this point (38)                 Point: Home exercise program (In Progress)     Learning Progress Summary           Patient Acceptance, E,TB, NR by MS at 7/11/2022 1434      Show all documentation for this point (13)                 Point: Body mechanics (Done)     Learning Progress Summary           Patient Acceptance, E,TB,D, VU,NR by  at 6/25/2022 1434      Show all documentation for this point (8)                 Point: Precautions (Done)     Learning Progress Summary           Patient Acceptance, E,TB,D, VU,NR by  at 6/25/2022 1434      Show all documentation for this point (8)                             User Key     Initials Effective Dates Name Provider Type Discipline     06/16/21 -  Fany Lima, PT Physical Therapist PT    LB 03/07/18 -  Liz Rodriguez, PTA Physical Therapist Assistant PT     06/16/21 -  Alyx Whiting, PT Physical Therapist PT    MS 06/16/21 -  Teagan Santos, PT Physical Therapist PT                PT Recommendation and Plan      Patient was wearing a face mask during this therapy encounter. Therapist used appropriate personal protective equipment including mask and gloves.  Mask used was standard procedure mask. Appropriate PPE was worn during the entire therapy session. Hand hygiene was completed before and  after therapy session. Patient is not in enhanced droplet precautions.                        Time Calculation:      PT Charges     Row Name 07/12/22 1242 07/12/22 1018          Time Calculation    Start Time 1230  -LB 1000  -LB     Stop Time 1300  -LB 1100  -LB     Time Calculation (min) 30 min  -LB 60 min  -LB     PT Goal Re-Cert Due Date -- 07/19/22  -LB           User Key  (r) = Recorded By, (t) = Taken By, (c) = Cosigned By    Initials Name Provider Type    Liz Henriquez PTA Physical Therapist Assistant                Therapy Charges for Today     Code Description Service Date Service Provider Modifiers Qty    87455408505 HC PT THER PROC EA 15 MIN 7/12/2022 Liz Rodriguez PTA GP 6            PT G-Codes  AM-PAC 6 Clicks Score (PT): 17      Liz Rodriguez PTA  7/12/2022

## 2022-07-12 NOTE — PLAN OF CARE
Goal Outcome Evaluation:  Plan of Care Reviewed With: patient        Progress: improving  Outcome Evaluation: A&Ox4. Assist x1. Continent/Incontinent. Pain treated. Rash to lower back. No unsafe behaviors. Participated with therapies today. Urology stated they want patient to be bladder scanned and cathetherized 1-2 times daily if scan is greater than 300. [facility] aware of increased thyroid lab. Dr. Isbell increased patient thyroid medication and advised to take it 30 minutes prior to any other morning medications. Will continue to monitor.

## 2022-07-12 NOTE — PLAN OF CARE
Goal Outcome Evaluation:      Slept well. Returned from Hemodialysis earlier this shift. Meds with water. On Fluids restrictions. Oxycodone given for Lt. Thigh pain, with some relief. Ambien given for sleep.  Glucose 119 & 202 tonight.  Refused miconazole cream tonight.

## 2022-07-12 NOTE — THERAPY TREATMENT NOTE
Inpatient Rehabilitation - Occupational Therapy Treatment Note    [facility]     Patient Name: Zaina Martinez  : 1965  MRN: [id number]    Today's Date: 2022                 Admit Date: 2022         ICD-10-CM ICD-9-CM   1. Generalized weakness  R53.1 780.79   2. Diabetic muscle infarction (Formerly [facility])  E11.69 250.80    M62.20 728.89   3. Other insomnia  G47.09 780.52       Patient Active Problem List   Diagnosis   • Renal insufficiency   • Hypertensive disorder   • Hypothyroidism   • Type 2 diabetes mellitus with kidney complication, with long-term current use of insulin ([facility]ly [facility])   • Rheumatoid arthritis ([facility]ly [facility])   • Angioedema   • Esophageal dysmotility   • Anemia   • Medically noncompliant   • Myocardial infarction due to demand ischemia ([facility]ly [facility])   • Enteritis   • PRES (posterior reversible encephalopathy syndrome)   • Urine retention   • Klebsiella infection   • Superficial thrombophlebitis   • Generalized weakness   • ESRD (end stage renal disease) ([facility]ly [facility])   • CAD (coronary artery disease)   • Abnormal urinalysis   • Chronic diastolic CHF (congestive heart failure) (Formerly [facility])   • Pyelonephritis   • Calculus of gallbladder with acute on chronic cholecystitis without obstruction   • Pleural effusion on right   • Anemia due to chronic kidney disease, on chronic dialysis (Formerly [facility])   • Abnormal findings on diagnostic imaging of other specified body structures   • Acute upper respiratory infection   • Agitation   • Alkaline phosphatase raised   • Casts present in urine   • Cellulitis of toe   • Hip pain   • Community acquired pneumonia   • Depressive disorder   • Diarrhea of presumed infectious origin   • Difficult or painful urination   • Disease due to severe acute respiratory syndrome coronavirus 2 (SARS-CoV-2)   • Dyspnea   • Encounter for follow-up examination after completed treatment for conditions other than malignant neoplasm   • H/O: hypothyroidism   • Hyperlipidemia   • Hypomagnesemia   • Intractable vomiting with  nausea   • Leukocytosis   • Luetscher's syndrome   • Need for influenza vaccination   • Restless legs   • Noncompliance with treatment   • Shoulder pain   • Urinary tract infectious disease   • Metabolic encephalopathy   • Abnormal findings on diagnostic imaging of abdomen   • Status post cholecystectomy   • Hyponatremia   • Leukocytosis   • Acute metabolic encephalopathy   • Encephalopathy, toxic   • Acute CVA (cerebrovascular accident) (HCC)   • Intracranial hemorrhage (HCC)   • Stroke (HCC)   • Abnormal urinalysis   • Diabetic muscle infarction (HCC)   • Other insomnia       Past Medical History:   Diagnosis Date   • Acute CVA (cerebrovascular accident) (HCC) 5/1/2022   • Acute on chronic diastolic CHF (congestive heart failure) (HCC)    • CAD (coronary artery disease) 12/20/2021   • Diabetes (HCC)    • Disease of thyroid gland    • GERD (gastroesophageal reflux disease)    • Hyperlipidemia 11/30/2018   • Hypertension    • Rheumatoid arthritis (HCC)        Past Surgical History:   Procedure Laterality Date   • CHOLECYSTECTOMY WITH INTRAOPERATIVE CHOLANGIOGRAM N/A 1/10/2022    Procedure: Laparoscopic cholecystectomy with intraoperative cholangiogram;  Surgeon: Ramana Raygoza MD;  Location: [facility];  Service: General;  Laterality: N/A;   • EYE SURGERY     • HYSTERECTOMY     • INSERTION HEMODIALYSIS CATHETER N/A 12/6/2021    Procedure: HEMODIALYSIS CATHETER INSERTION;  Surgeon: Keli Salazar MD;  Location: [facility] 18/19;  Service: Vascular;  Laterality: N/A;   • INSERTION HEMODIALYSIS CATHETER N/A 5/3/2022    Procedure: TUNNELED CATHETER PLACEMENT;  Surgeon: Keli Salazar MD;  Location: [facility] OR;  Service: Vascular;  Laterality: N/A;   • MUSCLE BIOPSY Left 6/15/2022    Procedure: Left quadriceps muscle biopsy;  Surgeon: Marques Ma MD;  Location: [facility] OR;  Service: Neurosurgery;  Laterality: Left;             IRF OT ASSESSMENT FLOWSHEET (last 12 hours)     IRF OT  Evaluation and Treatment     Row Name 07/12/22 1517 07/12/22 1212       OT Time and Intention    Document Type daily treatment  -AF daily treatment  -AF    Mode of Treatment occupational therapy  -AF occupational therapy  -AF    Patient Effort good  -AF --    Row Name 07/12/22 1517 07/12/22 1212       General Information    Patient/Family/Caregiver Comments/Observations pt sitting up in w/c after PT session  -AF pt sitting up in bed, agreeable to shower  -AF    Existing Precautions/Restrictions fall  contact precautions  -AF fall  contact precautions  -AF    Row Name 07/12/22 1517 07/12/22 1212       Pain Assessment    Pretreatment Pain Rating 4/10  -AF 4/10  -AF    Posttreatment Pain Rating 6/10  -AF 7/10  -AF    Pain Location - Side/Orientation Left  -AF Left  -AF    Pain Location - groin  -AF groin  thigh  -AF    Row Name 07/12/22 1517 07/12/22 1212       Cognition/Psychosocial    Affect/Mental Status (Cognition) flat/blunted affect  -AF flat/blunted affect  -AF    Orientation Status (Cognition) oriented x 3  -AF oriented x 3  -AF    Follows Commands (Cognition) follows one-step commands  -AF follows one-step commands  -AF    Personal Safety Interventions fall prevention program maintained;gait belt;nonskid shoes/slippers when out of bed  -AF fall prevention program maintained;gait belt;nonskid shoes/slippers when out of bed  -AF    Row Name 07/12/22 1212          Bathing    [place] Level (Bathing) bathing skills;upper body;standby assist;lower body;minimum assist (75% patient effort);contact guard assist  -AF     Assistive Device (Bathing) grab bar/tub rail;hand held shower spray hose;tub bench  -AF     Position (Bathing) supported sitting;supported standing  -AF     Row Name 07/12/22 1212          Upper Body Dressing    [place] Level (Upper Body Dressing) upper body dressing skills;set up assistance  -AF     Position (Upper Body Dressing) supported sitting  -AF     Comment (Upper Body Dressing) seated  on TTB  -AF     Row Name 07/12/22 1212          Lower Body Dressing    [place] Level (Lower Body Dressing) doff;don;pants/bottoms;shoes/slippers;dependent (less than 25% patient effort);nonverbal cues (demo/gesture);verbal cues  -AF     Position (Lower Body Dressing) supported standing;supported sitting  -AF     Comment (Lower Body Dressing) w/c level  -AF     Row Name 07/12/22 1212          Grooming    [place] Level (Grooming) grooming skills;verbal cues;deodorant application;hair care, combing/brushing;oral care regimen;set up  -AF     Position (Grooming) supported sitting  -AF     Comment (Grooming) w/c level  -AF     Row Name 07/12/22 1212          Toileting    Comment (Toileting) required brief change  -AF     Row Name 07/12/22 1212          Bed Mobility    Supine-Sit [place] (Bed Mobility) standby assist  increased time and vc's  -AF     Row Name 07/12/22 1212          Transfer Assessment/Treatment    Comment, (Transfers) sit to stand with grab bars in shower MIN A  -AF     Row Name 07/12/22 1212          Transfers    Bed-Chair [place] (Transfers) minimum assist (75% patient effort);verbal cues  -AF     Assistive Device (Bed-Chair Transfers) wheelchair  -AF     [place] Level (Shower Transfer) minimum assist (75% patient effort);verbal cues  -AF     Assistive Device (Shower Transfer) grab bar, tub/shower;shower chair;wheelchair  -AF     Row Name 07/12/22 1212          Bed-Chair Transfer    Comment, (Bed-Chair Transfer) increased time  -AF     Row Name 07/12/22 1212          Shower Transfer    Type (Shower Transfer) stand pivot/stand step  -AF     Comment, (Shower Transfer) with increased time  -AF     Row Name 07/12/22 1212          Motor Skills    Functional Endurance fair - with ADL tasks in shower states feeling very tired after shower  -AF     Row Name 07/12/22 1517          Shoulder (Therapeutic Exercise)    Shoulder Strengthening (Therapeutic Exercise)  bilateral;flexion;extension;scapular stabilization;sitting;2 lb free weight;15 repititions;2 sets  -AF     Row Name 07/12/22 1517          Elbow/Forearm (Therapeutic Exercise)    Elbow/Forearm Strengthening (Therapeutic Exercise) bilateral;flexion;extension;supination;pronation;sitting;2 lb free weight;15 repititions;2 sets  -AF     Row Name 07/12/22 1517          Hand (Therapeutic Exercise)    Hand Strengthening (Therapeutic Exercise) bilateral; strengthening;hand gripper;15 repititions;2 sets  -AF     Row Name 07/12/22 1212          Balance    Static Sitting Balance independent  -AF     Static Standing Balance contact guard  with use of grab bars  -AF     Row Name 07/12/22 1517 07/12/22 1212       Positioning and Restraints    Pre-Treatment Position sitting in chair/recliner  -AF in bed  -AF    Post Treatment Position wheelchair  -AF wheelchair  -AF    In Wheelchair sitting;call light within reach;encouraged to call for assist;exit alarm on  -AF sitting;call light within reach;encouraged to call for assist;exit alarm on  -AF          User Key  (r) = Recorded By, (t) = Taken By, (c) = Cosigned By    Initials Name Effective Dates    AF Tasha Helm, OTR 06/16/21 -                  Occupational Therapy Education                 Title: PT OT SLP Therapies (In Progress)     Topic: Occupational Therapy (Done)     Point: ADL training (Done)     Description:   Instruct learner(s) on proper safety adaptation and remediation techniques during self care or transfers.   Instruct in proper use of assistive devices.              Learning Progress Summary           Patient Acceptance, E,TB,D, VU,NR by EULOGIO at 6/25/2022 1434      Show all documentation for this point (2)                 Point: Home exercise program (Done)     Description:   Instruct learner(s) on appropriate technique for monitoring, assisting and/or progressing therapeutic exercises/activities.              Learning Progress Summary           Patient  Acceptance, E,D,H, VU by AF at 6/30/2022 1544    Comment: review of HEP with hand weights and theraband      Show all documentation for this point (2)                 Point: Precautions (Done)     Description:   Instruct learner(s) on prescribed precautions during self-care and functional transfers.              Learning Progress Summary           Patient Acceptance, E,TB,D, VU,NR by  at 6/25/2022 1434      Show all documentation for this point (2)                 Point: Body mechanics (Done)     Description:   Instruct learner(s) on proper positioning and spine alignment during self-care, functional mobility activities and/or exercises.              Learning Progress Summary           Patient Acceptance, E,TB,D, VU,NR by  at 6/25/2022 1434      Show all documentation for this point (2)                             User Key     Initials Effective Dates Name Provider Type Discipline     06/16/21 -  Fany Lima, PT Physical Therapist PT    AF 06/16/21 -  Tasha Helm OTR Occupational Therapist OT                    OT Recommendation and Plan                         Time Calculation:      Time Calculation- OT     Row Name 07/12/22 1519 07/12/22 1216          Time Calculation- OT    OT Start Time 1300  -AF 0830  -AF     OT Stop Time 1330  -AF 0930  -AF     OT Time Calculation (min) 30 min  -AF 60 min  -AF           User Key  (r) = Recorded By, (t) = Taken By, (c) = Cosigned By    Initials Name Provider Type    AF Tasha Helm, OTR Occupational Therapist              Therapy Charges for Today     Code Description Service Date Service Provider Modifiers Qty    15727802991 HC OT THER PROC EA 15 MIN 7/11/2022 Tasha Helm OTJANICE GO 2    81465948523 HC OT SELF CARE/MGMT/TRAIN EA 15 MIN 7/12/2022 Tasha Helm OTJANICE GO 4    36147767081 HC OT THER PROC EA 15 MIN 7/12/2022 Tasha Helm OTJANICE GO 2                   RAMO Mike  7/12/2022

## 2022-07-12 NOTE — PROGRESS NOTES
CC: Debility    SUBJECTIVE:    Patient feels she is did a little better with therapy today.  She is able to walk further although still limits distance due to pain at the left thigh.  She had some discomfort at the left posterior hip buttock today.  Does not describe pain at the knee or the other 3 extremities    I reviewed with the patient that I was down at [facility] today and while there reviewed with the pathologist-no additional results from the muscle biopsy last month.  They are to call with additional results when available               OBJECTIVE:  Vitals:    07/12/22 1727   BP: 136/70   Pulse: 51   Resp:    Temp:    SpO2:        GENERAL: NAD  MENTAL STATUS: Awake alert  HEENT:  NCAT  CARDIOVASCULAR: Sinus rhythm    CHEST:  hemodialysis access right chest  RESPIRATORY: Normal respirations.  Clear to auscultation without wheezes rales or rhonchi  ABDOMEN: Active bowel sounds, soft, nontender.     EXTREMITIES: Left thigh edema .  Edema at the left thigh appears similar..  Also has some edema in the left lower leg as well as right lower leg   Has diffuse tenderness to palpation about the left thigh.  No tenderness of the left calf    No myoclonus.  NEUROLOGIC:    Motor testing-takes resistance in the bilateral upper extremities and right lower extremity with left ankle dorsiflexion.  At least antigravity left knee extension but not fully tested secondary to pain inhibition      Scheduled Meds:aspirin, 81 mg, Oral, Daily  b complex-vitamin c-folic acid, 1 tablet, Oral, Daily  carvedilol, 25 mg, Oral, BID With Meals  cloNIDine, 0.2 mg, Oral, Q12H  epoetin brooke/brooke-epbx, 10,000 Units, Intravenous, Once per day on Mon Wed Fri  gabapentin, 100 mg, Oral, Q24H  gabapentin, 300 mg, Oral, BID  hydrALAZINE, 100 mg, Oral, Q8H  insulin glargine, 7 Units, Subcutaneous, QAM  insulin lispro, 0-7 Units, Subcutaneous, TID AC  [START ON 7/13/2022] levothyroxine, 200 mcg, Oral, Q AM  lidocaine, 1 patch, Transdermal,  Q24H  losartan, 100 mg, Oral, Q24H  miconazole, 1 application, Topical, Q12H  pantoprazole, 40 mg, Oral, Q AM  rOPINIRole, 0.75 mg, Oral, Nightly  sertraline, 150 mg, Oral, Daily  sodium zirconium cyclosilicate, 5 g, Oral, Daily  tamsulosin, 0.4 mg, Oral, Daily      Continuous Infusions:   PRN Meds:.•  acetaminophen  •  dextrose  •  dextrose  •  diphenoxylate-atropine  •  glucagon (human recombinant)  •  heparin (porcine)  •  hydrALAZINE  •  nitroglycerin  •  oxyCODONE  •  oxyCODONE  •  sodium chloride  •  zolpidem    Glucose   Date/Time Value Ref Range Status   07/12/2022 1604 159 (H) 70 - 130 mg/dL Final     Comment:     Meter: [id number] : [id number] Anup SAAVEDRA   07/12/2022 1139 229 (H) 70 - 130 mg/dL Final     Comment:     Meter: [id number] : [id number] Anup SAAVEDRA   07/12/2022 0629 196 (H) 70 - 130 mg/dL Final     Comment:     Meter: [id number] : [id number] Jennie Gilliam CNA   07/11/2022 2215 202 (H) 70 - 130 mg/dL Final     Comment:     Meter: [id number] : [id number] Jennie Gilliam [facility]   07/11/2022 1933 119 70 - 130 mg/dL Final     Comment:     Meter: [id number] : [id number] Catrachita MAJOR RN   07/11/2022 1213 167 (H) 70 - 130 mg/dL Final     Comment:     Meter: [id number] : [id number] Anup SAAVEDRA   07/11/2022 0636 266 (H) 70 - 130 mg/dL Final     Comment:     Meter: [id number] : [id number] Catrachita MAJOR RN   07/10/2022 2159 172 (H) 70 - 130 mg/dL Final     Comment:     Meter: [id number] : [id number] Lorena Valderrama RN     Results from last 7 days   Lab Units 07/12/22  1052 07/11/22  2325 07/10/22  0503   WBC 10*3/mm3 6.34 7.02 8.20   HEMOGLOBIN g/dL 8.0* 8.3* 7.9*   HEMATOCRIT % 25.2* 25.9* 24.9*   PLATELETS 10*3/mm3 149 156 170     Results from last 7 days   Lab Units 07/12/22  1052 07/11/22  2325 07/10/22  0503 07/09/22  0710   SODIUM mmol/L  --  133* 133* 131*   POTASSIUM mmol/L  --  4.2 4.4 3.8   CHLORIDE mmol/L  --  99 96* 96*   CO2 mmol/L  --  24.0 24.0 24.6   BUN  mg/dL  --  22* 34* 22*   CREATININE mg/dL  --  2.31* 3.17* 2.17*   CALCIUM mg/dL  --  8.3* 7.8* 7.6*   BILIRUBIN mg/dL 0.5 0.5 0.4 0.5   ALK PHOS U/L 551* 571* 572* 597*   ALT (SGPT) U/L 18 18 18 21   AST (SGOT) U/L 31 34* 32 35*   GLUCOSE mg/dL  --  202* 156* 167*      Latest Reference Range & Units 05/27/22 11:30   Creatine Kinase 20 - 180 U/L 94   Aldolase 3.3 - 10.3 U/L 5.6       Imaging Results (Last 24 Hours)     ** No results found for the last 24 hours. **          ASSESSMENT AND PLAN    # R MCA s/p TPA with subsequent ICH with debility  Initially held antiplatelet/anticoagulation.  Reviewed with neurology on May 20 and resume aspirin 81 mg daily  SBP goal <160  Recommend outpatient Neurology fu - repeat MRI in 3 months        # DM  June 27-hyperglycemia on steroids.  Lantus increased to 30 units twice daily, Humalog 5 units 3 times daily with meals, and all increase sliding scale coverage  June 28 -hypoglycemic after increasing insulin dosing.  Blood glucose up to 98 prior to lunch, will hold sliding scale insulin and give 5 units scheduled with meal  June 30-hypoglycemia-Lantus twice daily decreased from 35 to 20 units.  Scheduled Humalog with meals discontinued  July 1-prednisone has been discontinued.  Reviewed with internal medicine and Lantus decreased to 10 units twice daily and on discharge home to resume her home regimen of Lantus 10 units daily.  She will check her sugar tonight at home and if elevated give Lantus 10 units tonight and then continue with the Lantus 10 units daily tomorrow.  Reviewed with patient and her  that her sugars may be elevated initially as she just completed prednisone.  They were instructed to call for any additional instructions on adjustment in regimen if it remains elevated at home this weekend  July 6-Lantus 7 units daily  July 7-blood sugar range [phone]- yesterday,  this AM     # ESRD   Nephrology following  Inpatient HD     Flomax  Hyperkalemia  July 5-patient had hemodialysis on July 3 and July 4 for hyperkalemia.  Lokelma resumed by nephrology  July 7-had hemodialysis yesterday and has plans for hemodialysis again today  July 8-hemodialysis again today     Infectious disease-   # s/p catheter infection with MRSA  Vancomycin IV with stop date of May 26  May 24-vancomycin random equal 12.90-on vancomycin 500 mg IV on Hxccpft-Ksrzwnzv-Tsdnptzr  May 26-to complete course of vancomycin today  #Urinary tract infection-on Dana 3 urinalysis was negative for infection but noted to have leukocytosis increase and repeat urinalysis on June 6 shows findings consistent with urinary tract infection.  Placed on a course of cefdinir renal dose 3 mg daily for 7 days.  No costophrenic angle tenderness patient reviewed with infectious disease service.  Leukocytosis felt related to the bladder infection and not previous catheter infection.  June 8-urine culture results pending.  On cefdinir.  Culture with E. coli, sensitivity pending  June 9 - E coli sensitive to cephalosporins.  June 21 - continues with leukocytosis WBC  15K today. May be from inflammatory process. Steroids added yesterday.   June 22-urine described as milky characteristic, different from previous-recheck urinalysis with culture if indicated  June 23-Labs are still pending today.  Urinalysis also pending as she does not make much urine.  She had a temperature of 100.2 last evening, afebrile this morning.  June 27 - abnormal UA but urine culture from June 24 no growth  July 1-leukocytosis felt related to the noninfectious process in the left thigh as well as secondary to steroids  July 5-WBC trending down to 15.6 today  July 6-WBC trending down to 12 K  July 7-WBC 8.4     #left hydroureteronephrosis-Dana 3-CT scan shows left hydro ureter nephrosis.  She had some previous dilation of the ureter on comparison the past studies but increased today.  Bladder noted to be markedly distended.   Will do in and out cath now and daily to decompress bladder.  Addendum-intermittent cath for 600 cc.  June 4-once daily intermittent cath 450 cc.  June 5-seen by urology-Nguyễn catheter placed with plans to recheck hydroureter on renal ultrasound in 3 to 5 days.  June 6-Nguyễn catheter placed yesterday by urology, with only 170 cc out so far.  Patient reports did not note any change in her left groin pain after the in and out cath with decompression of the bladder..  June 8-reviewed with urology service-request noncontrast CT scan stone protocol to evaluate for resolution of the left hydronephrosis with urology planning to see tomorrow to review the films and then make recommendations, urology would recommend leaving the Nguyễn catheter in for the time being.  June 9 - improved left hydroureter on CT , Nguyễn discontinued.   July 1-to follow-up with urology as an outpatient with follow-up CT planned in August.  She occasionally required intermittent straight cath but this was very infrequent.  Home health nursing to follow.  Continues on Flomax  July 7-intermittent straight cath 400 cc  July 11-She continues with hydroureteronephrosis on the left side on follow-up CT scan-intermittent straight cath volumes have not been over about 400 cc over the past month.  Urology re-consulted for updated assessment  July 12-plan is intermittent straight cath routinely once or twice a day         #Anemia-  EPO.  June 6-hemoglobin 7.8  June 21- hemoglobin 8.3  June 27 - hemoglobin 8.7  June 28 - hemoglobin 8.8  July 6-hemoglobin 8.5     Lymphadenopathy-evaluated by hematology oncology while here.  No significant change from scans earlier this year.  She is to repeat a scan in 3 months and follow-up with hematology oncology  July 1123-cqasni-ag CT of the abdomen pelvis shows lymphadenopathy overall about the same per her report.  To have follow-up with hematology oncology and repeat scan in another 2 months or so     #Elevated alkaline  phosphatase  June 6-elevated alkaline phosphatase 770, up from 289 one week ago, 140 one month ago. Similar elevation in March, Feb even higher at 1,330 ( intra-abdominal fluid collection, underwent CT-guided aspiration  Revealed blood and cultures did not reveal any growth and therefore antibiotics  discontinued.  Surgery also did evaluate and signed off.), and elevated during admission in January ( She was seen by general surgery who recommended and performed laparoscopic cholecystectomy during that admission).   ALT 70, AST 87, Total bilirubin 0.8. Ca 8.3.  ? If liver source or bone or due to an inflammatory response.  June 7-GGT also elevated.  Seen by gastroenterology.  [place] biliary source.  Liver ultrasound ordered  June 8-liver ultrasound was unremarkable  June 21 - patient declined liver biopsy.   June 27-gastroenterology following peripherally-plans to follow-up as an outpatient and review option of liver biopsy again if alkaline phosphatase remains elevated  June 28-remains elevated at 503  June 30-alkaline phosphatase lower at 426  July 6-trend back up to 634  July 8-alkaline phosphatase unchanged 627     #Pain - neuropathy BL lower extremity/left thigh pain  Gabapentin/oxycodone.    June 7-patient with lethargy this morning.  May be due to urinary tract infection but with recent increase and gabapentin and oxycodone, will change gabapentin to 200 mg twice a day and decrease oxycodone from 5 back down to 2.5 mg p.o. every 4 hours as needed  June 8-better speed with her processing today  June 21 - pain med regimen recently adjusted with tramadol 25 mg q 4 hours prn, gabapentin 300 g bid, lidoderm patch, oxycodone 2.5 mg q 6 hours prn. Prednisone 60 mg daily added which may help with pain from inflammatory process. Reports pain better today and participated better in therapies.   June 27 - continue present regimen  July 1-home on oxycodone 5 mg p.o. every 4 hours.  Pain dispense #40, gabapentin 300 mg twice  daily, Tylenol 500 mg every 6 hours as needed  July 5-increase gabapentin slightly 300-100-300 mg.  Watch for any myoclonic twitching  July 8-had some lethargy earlier today, patient feels that she is tolerating oxycodone and gabapentin.  Was alert when seen on rounds.        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  Seen by infectious disease service with no acute infectious process noted.  Evaluated by hematology regarding lymphadenopathy, lymph node felt too small to biopsy.  Patient was on a course of low-dose prednisone 10 mg for 3 days then 5 mg for 2 days and then another 10 mg dose with out any significant improvement in the swelling or pain.  She has a history of rheumatoid arthritis.  See CT of the left thigh and MRI of the left thigh and pelvis reports   With lumbar radiculoplexitis and diabetic amyotrophy, on review of the literature do not see edema that she presents with associated with the findings or MRI changes in the tendon and musculature with edema.  There is descriptions of MRI changes in the plexus and peripheral nerve.  In terms of treatment options for diabetic amyotrophy , there have not been definitive clinical trials.  Immunomodulation with steroids has been inconclusive as well as other immunomodulation therapy.  Reviewed with the patient  that  feel that she would benefit from seeing her rheumatologist as an outpatient to evaluate this further, as there does appear to be inflammatory cause given the lymphadenopathy and edema.  Rheumatologist does not come to the hospital.   CPK x2 has been normal.  Aldolase has been normal.  May need to look at biopsy of left thigh muscle to assess further.   June 8-patient reviewed with neurology yesterday who will assess and also provide input regarding whether muscle biopsy may be helpful.  June 9 - Discussed with Neurology and Rheumatology - plan is for EMG and then muscle biopsy.   Dana 15 - Left quadricep muscle biopsy was completed 6/15, results pending.  Labs: CKs have been normal, ANCA panel 6/11 was unremarkable  June 21 - started on full treatment dose of Prednisone 60 mg daily yesterday pending path results. Reports pain better so far today. Specimen sent to [facility]. Clinical impression presently focal inflammatory myositis pending final pathology results.   June 22-pain continues better today.  June 23-outside pathology report still pending  June 24 - Patient reports left thigh pain better over past couple days but still present. Does not have the soreness at medial thigh as before. Complains of pain at mid-thigh. No change in swelling. Does have discomfort with proximal movement in LLE. Walked 320 feet CTG SBA RW today.   Pain improved on steroid. Discontinued atorvastatin. Biopsy results returned from [facility] - see report -Type 2 fiber atrophy, advanced. Denervation/reinnervation. No evidence of inflammatory myopathy or vasculitis. [facility] lab pursuing a dystrophy panel and will report findings in an addendum.  Reviewed with Neurology - as shown symptomatic response, continue Prednisone 60 mg daily for about one more week, then taper off over month.   June 27 - continue present regimen  June 28-increased pain medication regimen to oxycodone 5 mg every 4 hours as needed for severe pain.   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 Treatment would be aspirin which she is already on. Discussed with Neurology and as been on Prednisone 60 mg  for only 1.5 week, will stop and not do taper. Discussed with Internal Medicine who will adjust insulin.  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  Reviewed with the patient that treatment for this is aspirin which she is already on.  She may do walking activities but would avoid strenuous activities with left quadricep strengthening.  She may strengthen the other 3 extremities as tolerated.  Reviewed may take 3 months to resolve.  July 7-continues with left thigh pain.  She is noted to have increased edema in both lower extremities but particularly the left thigh compared to recent.  Lokelma has been associated with fluid retention which may explain increase edema in part.  Continues on aspirin for suspected diabetic muscular infarct.  No update report yet on additional studies for muscle biopsy.  Initial report was June 24 with additional studies planned  July 11-updated MRI imaging of the left thigh this past weekend.  See report.  She has appointment with rheumatology for further assessment later this week. -will recheck CK level for updated baseline as about 4 weeks out now from her biopsy which could have affected level.  Previous CK levels were not elevated.  White blood cell count has normalized which would hold against any active infection.  Edema at the left thigh appears about the same.  Continues with pain on the left side with pain inhibition with proximal muscles.  July 12-CPK was 39 yesterday.  Patient reviewed with pathologist at [facility] today-no additional results yet-they are to call when have additional results    # HTN   Coreg  Clonidine  Hydralazine  Losartan  Nephrology/internal medicine following     #Hypothyroidism  Levothyroxine  June 9 - recheck TSH- addendum Dana 10- On Nov 30, 2021 , on Dec 2, 2021 T4 = 0.90, on Dec 9, T4=1.68. On May 7, TSH = 134. Has been on  Levothyroxine 125 mcg/day it appears since at least Dec 13, 2021. See Dr Templeton's discharge note from Dec 17,2021 which discusses thyroid labs/dosing at that time. On June 9, 2022 TSH = 54.4.   Check free T4. Will get evaluation from Internal Medicine regarding thyroid studies/levothyroxine dosing.  June 11 - levothyroxine increased to 150mcg daily-continue this dose and she will need follow up TSH in 4-6 weeks from June 11 as an outpatient  July 11-repeat TSH and T4 ordered for Monday, July 11 July 12-TSH still elevated, higher at 96.  Levothyroxine increased to 200 mcg daily and separate from other meds.  Free T4 equal 0.68     #CAD  ASA held in setting of ICH - restart aspirin 81 mg daily on May 20, 2022 per Neurology     #Depression   Sertraline  June 28-sertraline dose increased  July 5-reviewed with psychiatry service-continue present regimen    Insomnia-improved on Ambien 5 mg  July 6-fatigue during the day-decrease Ambien 2.5 mg nightly and assess response  July 7-tolerated Ambien 2.5    #GERD   PPI     #Restless leg syndrome  Requip     #DVT prophylaxis-SCDs ordered but patient refusing.  Per neurology note May 11-continue off anticoagulation/antiplatelet for now. Restarted ASA 81 mg on May 20.  May 16-reviewed with patient and try venous foot compression devices  May 20-also not able to tolerate venous foot compression devices.  May 25-bilateral lower extremity venous duplex negative for DVT  July 9-  venous duplex negative for DVT left lower extremity    Diarrhea-July 7-loose stool x5 over the last day.  Lokelma is not associated with diarrhea.  C. difficile was negative on July 5.  Was on cefdinir from June 7 to June 13 and cefazolin one-time dose on Dana 15.  - will recheck C. Difficile  July 11-C. difficile was negative x2.  Diarrhea resolved        -  comprehensive inpatient rehabilitation program working with PT, OT, SLP for a minimum of three hours per day, five days per week. - will monitor for  progress     TEAM CONF - MAY 17- BED SBA. TRANSFER MIN. 4 STAIRS MIN. GAIT 160 FEET CTG RW. SLOW TO PROCESS. BATH MIN. LBD MIN. UBD MIN. GROOMING SET UP. TOILETING MIN . COGNITION OVERALL MILD DEFICITS. VISUAL IMPAIRMENT IMPACTS SOME TESTING. ESRD-HD. ANTIBIOTICS - VANCOMYCIN 10.90 YESTERDAY.  ELOS - 1-2 WEEKS.      TEAM CONF - MAY 24 - ALWAYS SO PROCESSING AFTER EACH DIALYSIS SESSION. AT HOME WOULD GO BACK TO HOME AND SLEEP. BED MIN ASSIST. TRANSFERS MIN. GAIT 80   FEET RW CTG. 4 STAIRS CTG. TOILET TRASNFERS AND SHOWER TRANSFERS MIN CTG. BATH CTG. LBD CTG. UBD SET UP. GROOMING SET UP. TOILETING CTG.   LEFT GROIN - ADDUCTOR PAIN - There is mild joint space narrowing involving both hips symmetrically. There are also some minimal degenerative changes involving both hips. The overall appearance is similar to the study of 12/16/2021. MODERATE WORD RETRIEVAL DEFICITS. IMPAIRED VISION AFFECTS HOME MANAGEMENT TASKS. HYPOGLLYCEMIA AFTER SSI .DECREASED TO 0-7 UNITS.   ELOS - ONE WEEK.         TEAM CONF - MAY 31 - TRANSFERS CTG. GAIT 40- FEET CTG RW LIMITED BY THIGH PAIN. TOILET TRANSFERS CTG. BATH CTG. LBD CTG. UBD SET UP. TOILETING CTG.  GROOMING SET UP. COGNITION - MODERATE WORD RETRIEVAL DEFICITS, MODERATE IMPAIRED MEMORY IMPACTED BY FATIGUE, STILL SLOW PROCESSING.  BNE (Active)  Att'n. - Mildly Imp.  Exec. Fx. - Mildly Imp., slowed processing speed  Rsng/Jgmnt - WNL  Arith - Mod Imp.  Visuospatial Skills - DNA, pt declined citing poor vision  Visual Mem. DNA  Verbal Mem. - WNL  Emot - Pt endorsed mild dysphoria and anxiousness secodnary to current inpatient  Stay.  CONTINENT BOWEL. OCCASIONAL VOID. ESRD-HD. ON INSULIN. SKIN INTACT. LIDODERM PATCH TO LEFT ADDUCTOR TENDON. COURSE OF STEROID FOR LEFT THIGH JEREMI. CONSULTED ORTHO FOR INPUT.  ELOS - POSSIBLY Thursday DEPENDING ON FURTHER EVALUATIONS.            TEAM CONF - JUNE 21 -  DECLINED THERAPY DUE TO PAIN.  AT MOST RECENT THERAPY WHEN PARTICIPATED, TRANSFERS  MIN. GAIT 80 FEET MIN / CTG MIN ASSIST RW. TOILET TRANSFERS CTG. TOILETING CTG. MODERATE IMPAIRED VERBAL MEMORY. PAIN MANAGEMENT - MEDS ADJUSTED - OXYCODONE 2.5 MG Q 6 HOURS PRN, TRAMADOL 25 MG Q 4 HOURS. MUSCLE BIOPSY RESULTS PENDING. STARTING ON PREDNISONE 60 MG DAILY YESTERDAY. WILL NEED TO ADJUST INSULING, BLOOD GLUCOSE > 300 THIS AM. GASTROENTEROLOGY EVALUATING LIVER. PATIENT DECLINES LIVER BIOPSY.  HYPOTHYROID - levothyroxine increased to 150mcg daily-continue this dose and she will need follow up TSH in 4-6 weeks from June 11 as an outpatient  ELOS -   1.5 WEEKS     TEAM CONF - June 28 - BED MIN  SIT TO SUPINE, SBA SUPINE TO SIT. CAR TRANSFERS CTG. TRANSFERS MIN ASSIST. 4 STAIRS MIN. GAIT 240 FEET CTG SBA. TOILET TRANSFERS MIN. UBB SBA. LBB MIN WITH LLE. UBD SET UP. LBD MOD ASSIST LLE.   INSULIN ADJUSTED FOR ELEVATED BLOOD GLUCOSE ON STEROIDS. ON PREDNISONE 60 MG DAILY AND PLAN TO TAPER OVER NEXT MONTH . BIOPSY REPORT SENT TO OUTPATIENT RHEUMATOLOGIST YESTERDAY. ADDITIONAL STUDIES ON BIOPSY PENDING.   BLADDER SCAN 400 CC BUT ONLY 200 CC ON INTERMITTENT STRAIGHT CATH. LEFT QUAD MUSCLE BIOPSY SITE HEALING.  ESRD - HD.   ELOS - Thursday WITH HOME HEALTH PT, OT, AND NURSING FOR DIABETES AND MONITORING ON STEROIDS.   Addendum-was not able to do car transfer after hemodialysis on Friday, July 1 and continued with inpatient rehab course.    TEAM CONF - July 12 - BED MOD. TRASNFER MIN, AT TIMES MIN-MOD. GAIT CTG RW 60 FEET. LAST WORKED ON STAIRS July 2 4 STAIRS MIN ASSIST. TOILET TRANSFERS MIN. UBB SBA. LBB MIN MOD. UBD SET UP. LBD MOD MAX. OXYCODONE AND GABAPENTIN FOR PAIN. LEFT HYDROURETERNEPHROSIS. - CATHETERIZE ONCE A DAY. ON FLOMAX. HAS A RASH ON BACK/BUTTOCK - MICONAZOLE CREAM. APPOINTMENT WITH DR Cortez - RHEUMATOLOGY - ON Friday July 15. CK = 39 YESTERDAY. THYROID RECHECK - TSH 96.1 AND FREE T4 0.68 YESTERDAY - INTERNAL MEDICINE FOLLOWING.   OS - LOOKING AT SUBACUTE OPTIONS.     Rehabilitation-July 7-Patient  has not been able to participate in therapy for 3 hours a day.  Will ask internal medicine to assess for transfer to the acute care wing in the hospital  July 8-Was evaluated by internal medicine service who did not feel that she needed transfer to the acute care wing of the hospit    Adonis Florentino MD       During rounds, used appropriate personal protective equipment including mask and gloves.  Additional gown if indicated.  Mask used was standard procedure mask. Appropriate PPE was worn during the entire visit.  Hand hygiene was completed before and after.

## 2022-07-12 NOTE — PROGRESS NOTES
Inpatient Rehabilitation Plan of Care Note    Plan of Care  Care Plan Reviewed - No updates at this time.    Body Function Structure    [RN] Skin Integrity(Active)  Current Status(07/12/2022): Rash to back and lower buttocks.  Weekly Goal(07/19/2022): Rash to resolve.  Discharge Goal: No skin problem.        Safety    Performed Intervention(s)  Fall precautions: bed low and locked, chair alarm and bed alarms in use, nonskid  socks and gait belt in use, call light and personal belongings within reach.  Hourly rounding.      Psychosocial    Performed Intervention(s)  Provide distraction and/or relaxation as needed.  Allow extra time for patient to verbalize needs, concerns, feelings, etc.      Body Systems    Performed Intervention(s)  Dialysis mwf  Monitor weight and I/O.  DM educator consult.  Accuchecks as ordered.      Sphincter Control    Performed Intervention(s)  Monitor I/O.  Bowel meds prn.      Pain    Performed Intervention(s)  Pain meds prn .  Ice to LLE per order    Signed by: Bessie Navarro RN

## 2022-07-12 NOTE — PROGRESS NOTES
Case Management  Inpatient Rehabilitation Team Conference    Conference Date/Time: 7/12/2022 8:14:20 AM    Team Conference Attendees:  Dr. Adonis Matthews, Bernadette Jaeger, Pharmacist  Liset Bonilla, LAUREW  Liz Rodriguez, GARY Helm, OT  Liset Fair, CTRS  Carol Jennings RD, LD  Brisa Baires, RN  Bessie Navarro, RN  Roberto Ortiz, Chaplain Teagan Santos, PT      Demographics            Age: 56Y            Gender: Female    Admission Date: 5/13/2022 7:05:00 PM  Rehabilitation Diagnosis:  Hemorrhage, Left Pietro  Past Medical History: Past Medical History:  DiagnosisDate  ?Acute CVA (cerebrovascular accident) ([facility])5/1/2022  ?Acute on chronic diastolic CHF (congestive heart failure) ([facility])?  ?CAD (coronary artery disease)12/20/2021  ?Diabetes (HCC)?  ?Disease of thyroid gland?  ?GERD (gastroesophageal reflux disease)?  ?Xiijfhbpcdxoox95/30/2018  ?Hypertension?  ?Rheumatoid arthritis (HCC)?    ?  ?  Family History  ProblemRelationAge of Onset  ?Malig HyperthermiaNeg Hx?  ?  ?      Plan of Care  Anticipated Discharge Date/Estimated Length of Stay: ELOS: Pending placement  Anticipated Discharge Destination: Community discharge with assistance  Discharge Plan : Discharge plan is home with family when medically ready to  discharge.  [facility] HH following  Medical Necessity Expected Level Rationale: Goals are to acheieve a level of CGA  with mobility and ADL's.  Rehabilitation prognosis indeterminate.  Medical  prognosis indeterminate.  Intensity and Duration: an average of 3 hours/5 days per week  Medical Supervision and 24 Hour Rehab Nursing: x  Physical Therapy: x  PT Intensity/Duration: 1 hour / day, 5 days / week, for approximately 2 weeks.  Occupational Therapy: x  OT Intensity/Duration: 1 hour / day, 5 days / week, for approximately 2 weeks.  Speech and Language Therapy: x  SLP Intensity/Duration: 1 hour / day, 5 days / week, for approximately 2 weeks.  Social Work: x  Therapeutic Recreation:  x  Psychology: x  Registered Dietician: x  Updated (if changes indicated)    Anticipated Discharge Date/Estimated Length of Stay:   ELOS: Pending placement    Based on the patient's medical and functional status, their prognosis and  expected level of functional improvement is: Goals are to acheieve a level of  CGA with mobility and ADL's.  Rehabilitation prognosis indeterminate.  Medical  prognosis indeterminate.      Interdisciplinary Problem/Goals/Status    All Rehab Problems:  Body Function Structure    [RN] Skin Integrity(Active)  Current Status(07/12/2022): Rash to back and lower buttocks.  Weekly Goal(07/19/2022): Rash to resolve.  Discharge Goal: No skin problem.        Body Systems    [RN] Genitourinary(Active)  Current Status(07/12/2022): Patient is a M,W,F HD patient with a tunneled  catheter to the R chest. Pt produces little urine.  pt oliguric. New cath order  6/20: straight cath daily and prn until cath residual < 300 ml x3 days.  Weekly Goal(07/19/2022): Pt will plan to transition to community dialysis.  Discharge Goal: Pt will transition to community dialysis.    [RN] Endocrine(Active)  Current Status(07/12/2022): Patient is a diabetic.  Weekly Goal(07/19/2022): Blood glucose will be controlled.  Discharge Goal: Patient will have medication regimen that she can manage at  home.        Cognition    [ST] Executive Functions(Active)  Current Status(06/27/2022): slow response and procesisng times; mod impaired  language/word retrieval; improved ability to formulate simple sentences for  structured tasks/conversations; mod impaired verbal memory  Weekly Goal(06/28/2022): recall details from daily events indep[  Discharge Goal: fxnl cognition for basic ADL activities        Mobility    [OT] Toilet Transfers(Active)  Current Status(07/11/2022): MIN  Weekly Goal(07/13/2022): CGA  Discharge Goal: CGA    [OT] Tub/Shower Transfers(Active)  Current Status(07/11/2022): min A  Weekly Goal(07/13/2022):  CGA  Discharge Goal: CGA    [PT] Bed/Chair/Wheelchair(Active)  Current Status(07/11/2022): Ran[place]fo. Up to min/modA  Weekly Goal(07/18/2022): CGA  Discharge Goal: CGA    [PT] Bed Mobility(Active)  Current Status(07/11/2022): modA  Weekly Goal(07/18/2022): Ranulfo  Discharge Goal: Ranulfo    [PT] Car Transfers(Active)  Current Status(07/11/2022): CGA, RW (on 7/2)  Weekly Goal(07/18/2022): PT only  Discharge Goal: Ran[place]fo    [PT] Walk(Active)  Current Status(07/11/2022): 60' CGA-Ranulfo, RW  Weekly Goal(07/18/2022): to BR, RW, CGA/SBA  Discharge Goal: 160', AAD,SBA/CGA    [PT] Stairs(Active)  Current Status(07/11/2022): 4, rails, Ranulfo (on 7/2)  Weekly Goal(07/18/2022): PT only  Discharge Goal: 4, 1 rail, Ranulfo        Pain    [RN] Pain Management(Active)  Current Status(07/12/2022): Pt has consistent pain to L groin/L medial upper  thigh . Muscle biopsy site to L thigh .  Weekly Goal(07/19/2022): Pain will be controlled.  Discharge Goal: Pt will have pain management regimen that she can follow at  home.        Psychosocial    [RN] Coping/Adjustment(Active)  Current Status(07/12/2022): Pt is A/Ox4. Pt is at increased risk for reduced  coping r/t hospitalization and comorbidities. Pt has been calling for assistance  appropriately as needed. Pt has supportive family.  Weekly Goal(07/19/2022): Pt will verbalize needs, feelings, concerns to staff as  needed.  Discharge Goal: Pt will verbalize adequate coping strategies to use at home.    [RN] Behavior(Active)  Current Status(01/01/1753):  Weekly Goal(01/01/1753):  Discharge Goal:        Safety    [RN] Potential for Injury(Active)  Current Status(07/12/2022): Patient is at increased risk for falls/injury r/t  hospitalization and generalized weakness.  Weekly Goal(07/19/2022): Patient will call appropriately for assistance as  needed.  Discharge Goal: Patient will remain free from falls/injury.        Self Care    [OT] Bathing(Active)  Current Status(07/11/2022): UB-SBA, LB-MIN/MOD  Weekly  Goal(07/13/2022): MIn  Discharge Goal: MIN    [OT] Dressing (Lower)(Active)  Current Status(07/11/2022): Mod/MAX  Weekly Goal(07/13/2022): MOD  Discharge Goal: MOD    [OT] Dressing (Upper)(Active)  Current Status(07/11/2022): set up  Weekly Goal(07/13/2022): SBA  Discharge Goal: SBA    [OT] Grooming(Active)  Current Status(07/11/2022): set up  Weekly Goal(07/13/2022): SBA  Discharge Goal: SBA    [OT] Toileting(Active)  Current Status(07/11/2022): MIN/MOD  Weekly Goal(07/13/2022): MIN  Discharge Goal: MIN        Sphincter Control    [RN] Bowel and bladder (Active)  Current Status(07/07/2022): Patient can be continent and is often incontinent of  stool. Patient is oliguric and is an HD patient. New cath order 6/20.  Weekly Goal(07/14/2022): Pt will be continent 75% of the time. Patient will have  BM at least q3 days or within normal patterns.  Discharge Goal: Patient will be continent 100% of the time.        Comments: 5/17 Slow processing, participating with therapies    5/24 Min A with transfers, CGA with ambulation with rwx.  Freq incontinent of  bowel.   Mod memory impairments.  Groin pain.  T, Th, Sat dialsysis schedule.    5/31 Family Conference last Friday, Plan dc on 6/2 with home health.  40' CGA  with rwx, limited due to pain.  Slow responses and processing at times.    6/7 Amb 80' and up to as far as 160' CGA, toilet transfers CGA, caleb tto do 4  steps CGA with rails.  Better initiation, improving in speech therapy.  C/O side  pain.  HD ongoing.  Elevated BP since HD yesterday.    6/14 A lot of pain still ongoing, biopsy of leg planned for Wednesday.  Still  moderate memory deficits.  Toilet transfers CGA, Bathing CGA.  LBD tasks need  more assistance due to pain.    6/21 Bed exercises only due to pain.  Still waiting on leg bx.  More alert with  steroids on board.  Pain main issue.    6/28 Biopsy did not really show anything in particular.  Running some additional  staining on the biopsy.  Remain on  steroids.  Getting blood sugars under control  while on steroids.  Ambulating ' rxwx.  Min A with bed to chair  transfers.  Better participation in therapy.  Able to do 4 steps with rails Min  A. Min A with toilet transfers.    7/5 Suspect diabetic muscle infarction.  Amb 160' CGA / SBA rwx, able to do 4  steps with rails Min A.  Toilet transfers Min A.  High potassium a couple times  requiring dialysis.    7/12 Bed mob Mod A, car transfers CGA with rwx, able to complete 4 steps with  rails Min A on 7/2.  Able to ambulate 60' CGA - Min A rwx.  Toilet transfers Min  A.  UBD set up, LBD Mod - Max.  Grooming set up.  Not emptying bladder all the  way, plan to ISC once daily.  Rash to her back    Signed by: Brisa Baires RN    Physician CoSigned By: Adonis Florentino 07/12/2022 09:39:03

## 2022-07-12 NOTE — PROGRESS NOTES
[facility] Progress Note      Patient Name: Zaina Martinez  : 1965  MRN: [id number]  Primary Care Physician:  Karson Oreilly MD  Date of admission: 2022    Subjective     Interval History:   Follow up ESRD.  In shower room.  Dialysis yesterday. 3.5 kg off. BP high last night and  early am, much improved this am.     Review of Systems:   As noted above    Objective     Vitals:   Temp:  [97.5 °F (36.4 °C)-98.6 °F (37 °C)] 98.6 °F (37 °C)  Heart Rate:  [50-57] 57  Resp:  [18-20] 18  BP: (148-190)/(70-92) 148/73    Intake/Output Summary (Last 24 hours) at 2022 0952  Last data filed at 2022 0745  Gross per 24 hour   Intake 340 ml   Output 3500 ml   Net -3160 ml       Physical Exam:   In shower .    Scheduled Meds:     aspirin, 81 mg, Oral, Daily  b complex-vitamin c-folic acid, 1 tablet, Oral, Daily  carvedilol, 25 mg, Oral, BID With Meals  cloNIDine, 0.2 mg, Oral, Q12H  epoetin brooke/brooke-epbx, 10,000 Units, Intravenous, Once per day on   gabapentin, 100 mg, Oral, Q24H  gabapentin, 300 mg, Oral, BID  hydrALAZINE, 100 mg, Oral, Q8H  insulin glargine, 7 Units, Subcutaneous, QAM  insulin lispro, 0-7 Units, Subcutaneous, TID AC  levothyroxine, 150 mcg, Oral, Q AM  lidocaine, 1 patch, Transdermal, Q24H  losartan, 100 mg, Oral, Q24H  miconazole, 1 application, Topical, Q12H  pantoprazole, 40 mg, Oral, Q AM  rOPINIRole, 0.75 mg, Oral, Nightly  sertraline, 150 mg, Oral, Daily  sodium zirconium cyclosilicate, 5 g, Oral, Daily  tamsulosin, 0.4 mg, Oral, Daily      IV Meds:        Results Reviewed:   I have personally reviewed the results from the time of this admission to 2022 09:52 EDT     Results from last 7 days   Lab Units 22  3379 07/10/22  0503 22  0710   SODIUM mmol/L 133* 133* 131*   POTASSIUM mmol/L 4.2 4.4 3.8   CHLORIDE mmol/L 99 96* 96*   CO2 mmol/L 24.0 24.0 24.6   BUN mg/dL 22* 34* 22*   CREATININE mg/dL 2.31* 3.17* 2.17*   CALCIUM  mg/dL 8.3* 7.8* 7.6*   BILIRUBIN mg/dL 0.5 0.4 0.5   ALK PHOS U/L 571* 572* 597*   ALT (SGPT) U/L 18 18 21   AST (SGOT) U/L 34* 32 35*   GLUCOSE mg/dL 202* 156* 167*       Estimated Creatinine Clearance: 25.9 mL/min (A) (by C-G formula based on SCr of 2.31 mg/dL (H)).    Results from last 7 days   Lab Units 07/11/22 2325 07/10/22  0503 07/09/22  0710   PHOSPHORUS mg/dL 3.0 3.9 3.1             Results from last 7 days   Lab Units 07/11/22  2325 07/10/22  0503 07/09/22  0711 07/08/22  0905 07/07/22  1014   WBC 10*3/mm3 7.02 8.20 7.81 8.42 8.44   HEMOGLOBIN g/dL 8.3* 7.9* 8.1* 8.1* 7.8*   PLATELETS 10*3/mm3 156 170 173 175 173             Assessment / Plan     ASSESSMENT:  1.    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 HD MWF.   2.  Intracerebral bleed and altered mental status.  Rehab .   3.  Infected TDC, s/p removal 5/1. Replaced. Staph aureus . completed vancomycin.  4.  DM2    5.  Coronary artery disease  6.  Acute on chronic diastolic dysfunction . Volume off with HD.   7.  Anemia of CKD, on long-acting ANDRAE as an outpatient. Hg stable yesterday .  On procrit .  8. Hypothyroidism.  TSH still very high and Free t4 low .  LHA managing.  Will likely increase replacement .  9. 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 On ASA.   PLAN:  1.  HD tomorrow .  2. MWF labs would be fine with renal .  Ruth Lira MD  07/12/22  09:52 EDT    [facility]  [phone]

## 2022-07-13 LAB
ANION GAP SERPL CALCULATED.3IONS-SCNC: 12 MMOL/L (ref 5–15)
BUN SERPL-MCNC: 35 MG/DL (ref 6–20)
BUN/CREAT SERPL: 9.6 (ref 7–25)
CALCIUM SPEC-SCNC: 7.7 MG/DL (ref 8.6–10.5)
CHLORIDE SERPL-SCNC: 94 MMOL/L (ref 98–107)
CO2 SERPL-SCNC: 23 MMOL/L (ref 22–29)
CREAT SERPL-MCNC: 3.63 MG/DL (ref 0.57–1)
EGFRCR SERPLBLD CKD-EPI 2021: 14.1 ML/MIN/1.73
GLUCOSE BLDC GLUCOMTR-MCNC: 113 MG/DL (ref 70–130)
GLUCOSE BLDC GLUCOMTR-MCNC: 122 MG/DL (ref 70–130)
GLUCOSE BLDC GLUCOMTR-MCNC: 149 MG/DL (ref 70–130)
GLUCOSE BLDC GLUCOMTR-MCNC: 171 MG/DL (ref 70–130)
GLUCOSE BLDC GLUCOMTR-MCNC: 285 MG/DL (ref 70–130)
GLUCOSE SERPL-MCNC: 304 MG/DL (ref 65–99)
PHOSPHATE SERPL-MCNC: 4.3 MG/DL (ref 2.5–4.5)
POTASSIUM SERPL-SCNC: 4.6 MMOL/L (ref 3.5–5.2)
SODIUM SERPL-SCNC: 129 MMOL/L (ref 136–145)

## 2022-07-13 PROCEDURE — 82962 GLUCOSE BLOOD TEST: CPT

## 2022-07-13 PROCEDURE — 80048 BASIC METABOLIC PNL TOTAL CA: CPT | Performed by: INTERNAL MEDICINE

## 2022-07-13 PROCEDURE — 97110 THERAPEUTIC EXERCISES: CPT

## 2022-07-13 PROCEDURE — 84100 ASSAY OF PHOSPHORUS: CPT | Performed by: INTERNAL MEDICINE

## 2022-07-13 PROCEDURE — 25010000002 EPOETIN ALFA-EPBX 10000 UNIT/ML SOLUTION: Performed by: PHYSICAL MEDICINE & REHABILITATION

## 2022-07-13 PROCEDURE — 97535 SELF CARE MNGMENT TRAINING: CPT

## 2022-07-13 PROCEDURE — 25010000002 HEPARIN (PORCINE) PER 1000 UNITS: Performed by: PHYSICAL MEDICINE & REHABILITATION

## 2022-07-13 PROCEDURE — 63710000001 INSULIN LISPRO (HUMAN) PER 5 UNITS: Performed by: HOSPITALIST

## 2022-07-13 RX ADMIN — OXYCODONE 5 MG: 5 TABLET ORAL at 20:33

## 2022-07-13 RX ADMIN — OXYCODONE 5 MG: 5 TABLET ORAL at 05:56

## 2022-07-13 RX ADMIN — GABAPENTIN 100 MG: 100 CAPSULE ORAL at 14:00

## 2022-07-13 RX ADMIN — HYDRALAZINE HYDROCHLORIDE 100 MG: 50 TABLET, FILM COATED ORAL at 05:56

## 2022-07-13 RX ADMIN — INSULIN LISPRO 4 UNITS: 100 INJECTION, SOLUTION INTRAVENOUS; SUBCUTANEOUS at 11:39

## 2022-07-13 RX ADMIN — INSULIN GLARGINE-YFGN 7 UNITS: 100 INJECTION, SOLUTION SUBCUTANEOUS at 09:07

## 2022-07-13 RX ADMIN — CARVEDILOL 25 MG: 25 TABLET, FILM COATED ORAL at 09:06

## 2022-07-13 RX ADMIN — ROPINIROLE HYDROCHLORIDE 0.75 MG: 0.5 TABLET, FILM COATED ORAL at 20:32

## 2022-07-13 RX ADMIN — SODIUM ZIRCONIUM CYCLOSILICATE 5 G: 5 POWDER, FOR SUSPENSION ORAL at 09:07

## 2022-07-13 RX ADMIN — CLONIDINE HYDROCHLORIDE 0.2 MG: 0.1 TABLET ORAL at 09:06

## 2022-07-13 RX ADMIN — Medication 1 TABLET: at 09:07

## 2022-07-13 RX ADMIN — CLONIDINE HYDROCHLORIDE 0.2 MG: 0.1 TABLET ORAL at 20:32

## 2022-07-13 RX ADMIN — CARVEDILOL 25 MG: 25 TABLET, FILM COATED ORAL at 19:05

## 2022-07-13 RX ADMIN — HEPARIN SODIUM 3800 UNITS: 1000 INJECTION INTRAVENOUS; SUBCUTANEOUS at 18:36

## 2022-07-13 RX ADMIN — LEVOTHYROXINE SODIUM 200 MCG: 0.1 TABLET ORAL at 05:28

## 2022-07-13 RX ADMIN — TAMSULOSIN HYDROCHLORIDE 0.4 MG: 0.4 CAPSULE ORAL at 09:06

## 2022-07-13 RX ADMIN — LOSARTAN POTASSIUM 100 MG: 100 TABLET, FILM COATED ORAL at 09:06

## 2022-07-13 RX ADMIN — ASPIRIN 81 MG: 81 TABLET, COATED ORAL at 09:07

## 2022-07-13 RX ADMIN — HYDRALAZINE HYDROCHLORIDE 100 MG: 50 TABLET, FILM COATED ORAL at 20:33

## 2022-07-13 RX ADMIN — GABAPENTIN 300 MG: 300 CAPSULE ORAL at 20:32

## 2022-07-13 RX ADMIN — LIDOCAINE 1 PATCH: 50 PATCH CUTANEOUS at 09:07

## 2022-07-13 RX ADMIN — PANTOPRAZOLE SODIUM 40 MG: 40 TABLET, DELAYED RELEASE ORAL at 05:56

## 2022-07-13 RX ADMIN — SERTRALINE 150 MG: 100 TABLET, FILM COATED ORAL at 09:06

## 2022-07-13 RX ADMIN — GABAPENTIN 300 MG: 300 CAPSULE ORAL at 09:07

## 2022-07-13 RX ADMIN — MICONAZOLE NITRATE 1 APPLICATION: 2 CREAM TOPICAL at 11:40

## 2022-07-13 RX ADMIN — OXYCODONE 5 MG: 5 TABLET ORAL at 14:00

## 2022-07-13 RX ADMIN — MICONAZOLE NITRATE 1 APPLICATION: 2 CREAM TOPICAL at 20:35

## 2022-07-13 RX ADMIN — EPOETIN ALFA-EPBX 10000 UNITS: 10000 INJECTION, SOLUTION INTRAVENOUS; SUBCUTANEOUS at 16:00

## 2022-07-13 RX ADMIN — ZOLPIDEM TARTRATE 2.5 MG: 5 TABLET ORAL at 21:55

## 2022-07-13 RX ADMIN — OXYCODONE 5 MG: 5 TABLET ORAL at 10:11

## 2022-07-13 NOTE — THERAPY TREATMENT NOTE
Inpatient Rehabilitation - Occupational Therapy Treatment Note    [facility]     Patient Name: Zaina Martinez  : 1965  MRN: [id number]    Today's Date: 2022                 Admit Date: 2022         ICD-10-CM ICD-9-CM   1. Generalized weakness  R53.1 780.79   2. Diabetic muscle infarction ([facility])  E11.69 250.80    M62.20 728.89   3. Other insomnia  G47.09 780.52       Patient Active Problem List   Diagnosis   • Renal insufficiency   • Hypertensive disorder   • Hypothyroidism   • Type 2 diabetes mellitus with kidney complication, with long-term current use of insulin ([facility])   • Rheumatoid arthritis ([facility])   • Angioedema   • Esophageal dysmotility   • Anemia   • Medically noncompliant   • Myocardial infarction due to demand ischemia ([facility])   • Enteritis   • PRES (posterior reversible encephalopathy syndrome)   • Urine retention   • Klebsiella infection   • Superficial thrombophlebitis   • Generalized weakness   • ESRD (end stage renal disease) ([facility])   • CAD (coronary artery disease)   • Abnormal urinalysis   • Chronic diastolic CHF (congestive heart failure) ([facility])   • Pyelonephritis   • Calculus of gallbladder with acute on chronic cholecystitis without obstruction   • Pleural effusion on right   • Anemia due to chronic kidney disease, on chronic dialysis ([facility])   • Abnormal findings on diagnostic imaging of other specified body structures   • Acute upper respiratory infection   • Agitation   • Alkaline phosphatase raised   • Casts present in urine   • Cellulitis of toe   • Hip pain   • Community acquired pneumonia   • Depressive disorder   • Diarrhea of presumed infectious origin   • Difficult or painful urination   • Disease due to severe acute respiratory syndrome coronavirus 2 (SARS-CoV-2)   • Dyspnea   • Encounter for follow-up examination after completed treatment for conditions other than malignant neoplasm   • H/O: hypothyroidism   • Hyperlipidemia   • Hypomagnesemia   • Intractable vomiting with  nausea   • Leukocytosis   • Luetscher's syndrome   • Need for influenza vaccination   • Restless legs   • Noncompliance with treatment   • Shoulder pain   • Urinary tract infectious disease   • Metabolic encephalopathy   • Abnormal findings on diagnostic imaging of abdomen   • Status post cholecystectomy   • Hyponatremia   • Leukocytosis   • Acute metabolic encephalopathy   • Encephalopathy, toxic   • Acute CVA (cerebrovascular accident) (HCC)   • Intracranial hemorrhage (HCC)   • Stroke (HCC)   • Abnormal urinalysis   • Diabetic muscle infarction (HCC)   • Other insomnia       Past Medical History:   Diagnosis Date   • Acute CVA (cerebrovascular accident) (HCC) 5/1/2022   • Acute on chronic diastolic CHF (congestive heart failure) (HCC)    • CAD (coronary artery disease) 12/20/2021   • Diabetes (HCC)    • Disease of thyroid gland    • GERD (gastroesophageal reflux disease)    • Hyperlipidemia 11/30/2018   • Hypertension    • Rheumatoid arthritis (HCC)        Past Surgical History:   Procedure Laterality Date   • CHOLECYSTECTOMY WITH INTRAOPERATIVE CHOLANGIOGRAM N/A 1/10/2022    Procedure: Laparoscopic cholecystectomy with intraoperative cholangiogram;  Surgeon: Ramana Raygoza MD;  Location: [facility];  Service: General;  Laterality: N/A;   • EYE SURGERY     • HYSTERECTOMY     • INSERTION HEMODIALYSIS CATHETER N/A 12/6/2021    Procedure: HEMODIALYSIS CATHETER INSERTION;  Surgeon: Keli Salazar MD;  Location: [facility] 18/19;  Service: Vascular;  Laterality: N/A;   • INSERTION HEMODIALYSIS CATHETER N/A 5/3/2022    Procedure: TUNNELED CATHETER PLACEMENT;  Surgeon: Keli Salazar MD;  Location: [facility] OR;  Service: Vascular;  Laterality: N/A;   • MUSCLE BIOPSY Left 6/15/2022    Procedure: Left quadriceps muscle biopsy;  Surgeon: Marques Ma MD;  Location: [facility] OR;  Service: Neurosurgery;  Laterality: Left;             IRF OT ASSESSMENT FLOWSHEET (last 12 hours)     IRF OT  Evaluation and Treatment     [facility] Name 07/13/22 1051          OT Time and Intention    Document Type daily treatment  -AF     Mode of Treatment occupational therapy  -AF     Row Name 07/13/22 1051          General Information    Patient/Family/Caregiver Comments/Observations pt sitting up in bed  -AF     General Observations of Patient daughter and grandson present for teaching this morning  -AF     Existing Precautions/Restrictions fall  contact precautions  -AF     Row Name 07/13/22 1051          Pain Assessment    Pretreatment Pain Rating 4/10  -AF     Posttreatment Pain Rating 4/10  -AF     Pain Location - Side/Orientation Left  -AF     Pain Location - groin  -AF     Row Name 07/13/22 1051          Cognition/Psychosocial    Affect/Mental Status (Cognition) flat/blunted affect  -AF     Orientation Status (Cognition) oriented x 3  -AF     Follows Commands (Cognition) follows one-step commands  -AF     Personal Safety Interventions fall prevention program maintained;gait belt;nonskid shoes/slippers when out of bed  -AF     Row Name 07/13/22 1051          Bathing    [place] Level (Bathing) bathing skills;upper body;set up  -AF     Position (Bathing) sink side;supported sitting  -AF     Row Name 07/13/22 1051          Upper Body Dressing    [place] Level (Upper Body Dressing) upper body dressing skills;set up assistance  -AF     Position (Upper Body Dressing) supported sitting  -AF     Set-up Assistance (Upper Body Dressing) obtain clothing  -AF     [facility] Name 07/13/22 1051          Lower Body Dressing    [place] Level (Lower Body Dressing) doff;don;pants/bottoms;shoes/slippers;socks;maximum assist (25% patient effort)  -AF     Position (Lower Body Dressing) supported sitting;supported standing  -AF     Comment (Lower Body Dressing) w/c level  -AF     Row Name 07/13/22 1051          Grooming    [place] Level (Grooming) grooming skills;verbal cues;deodorant application;hair care, combing/brushing;oral  care regimen;set up  -AF     Position (Grooming) supported sitting  -AF     Set-up Assistance (Grooming) obtain supplies  -AF     Comment (Grooming) w/c level  -AF     Row Name 07/13/22 1051          Bed Mobility    Supine-Sit [place] (Bed Mobility) standby assist  -AF     Assistive Device (Bed Mobility) bed rails  -AF     Row Name 07/13/22 1051          Transfer Assessment/Treatment    Transfers bathtub transfer  -AF     Comment, (Transfers) sit to stand with LBD ADLs at sink CGA  -AF     Row Name 07/13/22 1051          Transfers    Bed-Chair [place] (Transfers) contact guard  -AF     Assistive Device (Bed-Chair Transfers) wheelchair;walker, front-wheeled  -AF     [place] Level (Bathtub Transfer) minimum assist (75% patient effort)  -AF     Assistive Device (Bathtub Transfer) tub bench;wheelchair;walker, front-wheeled  A with swining legs in and out of the bath tub  -AF     Row Name 07/13/22 1051          Bed-Chair Transfer    Comment, (Bed-Chair Transfer) increased time  -AF     Row Name 07/13/22 1051          Bathtub Transfer    Type (Bathtub Transfer) lateral;stand pivot/stand step  -AF     Comment, (Bathtub Transfer) daughter and grandson observed dry run transfer to TTB  -AF     Row Name 07/13/22 1051          Motor Skills    Functional Endurance fair plus  -AF     Row Name 07/13/22 1051          Balance    Static Sitting Balance independent  -AF     Static Standing Balance contact guard  -AF     Comment, Balance assisted with pulling up her pants  -AF     Row Name 07/13/22 1051          Positioning and Restraints    Pre-Treatment Position in bed  -AF     Post Treatment Position wheelchair  -AF     In Wheelchair sitting;exit alarm on;with PT  with daughter and vladimir present  -AF           User Key  (r) = Recorded By, (t) = Taken By, (c) = Cosigned By    Initials Name Effective Dates    AF Tasha Helm, OTR 06/16/21 -                  Occupational Therapy Education                 Title: PT  OT SLP Therapies (In Progress)     Topic: Occupational Therapy (Done)     Point: ADL training (Done)     Description:   Instruct learner(s) on proper safety adaptation and remediation techniques during self care or transfers.   Instruct in proper use of assistive devices.              Learning Progress Summary           Patient Acceptance, E,TB,D, VU,NR by  at 6/25/2022 1434      Show all documentation for this point (2)                 Point: Home exercise program (Done)     Description:   Instruct learner(s) on appropriate technique for monitoring, assisting and/or progressing therapeutic exercises/activities.              Learning Progress Summary           Patient Acceptance, E,D,H, VU by  at 6/30/2022 1544    Comment: review of HEP with hand weights and theraband      Show all documentation for this point (2)                 Point: Precautions (Done)     Description:   Instruct learner(s) on prescribed precautions during self-care and functional transfers.              Learning Progress Summary           Patient Acceptance, E,TB,D, VU,NR by  at 6/25/2022 1434      Show all documentation for this point (2)                 Point: Body mechanics (Done)     Description:   Instruct learner(s) on proper positioning and spine alignment during self-care, functional mobility activities and/or exercises.              Learning Progress Summary           Patient Acceptance, E,TB,D, VU,NR by  at 6/25/2022 1434      Show all documentation for this point (2)                             User Key     Initials Effective Dates Name Provider Type Discipline     06/16/21 -  Fany Lima, PT Physical Therapist PT     06/16/21 -  Tasha Helm OTR Occupational Therapist OT                    OT Recommendation and Plan                         Time Calculation:      Time Calculation- OT     Row Name 07/13/22 1057             Time Calculation- OT    OT Start Time 0830  -AF      OT Stop Time 0930  -AF      OT Time  Calculation (min) 60 min  -AF            User Key  (r) = Recorded By, (t) = Taken By, (c) = Cosigned By    Initials Name Provider Type    Tasha Metz, OTR Occupational Therapist              Therapy Charges for Today     Code Description Service Date Service Provider Modifiers Qty    96967095049 HC OT SELF CARE/MGMT/TRAIN EA 15 MIN 7/12/2022 Tasha Helm OTJANICE GO 4    76393354694 HC OT THER PROC EA 15 MIN 7/12/2022 Tasha Helm OTR GO 2    [id number]  OT SELF CARE/MGMT/TRAIN EA 15 MIN 7/13/2022 Tasha Helm OTR GO 4                   RAMO Mike  7/13/2022

## 2022-07-13 NOTE — PROGRESS NOTES
CC: Debility    SUBJECTIVE:    Did better in therapy yesterday with transfers min assist and gait 60 feet contact-guard min.  Had more discomfort today and did not mobilize as well.  Current plan is to pursue subacute therapy at the [facility].   Left thigh pain overall continues to same.  Describes it more so at the medial thigh today where she had the original discomfort.               OBJECTIVE:  Vitals:    07/13/22 1152   BP: 177/74   Pulse: 57   Resp: 18   Temp: 95.4 °F (35.2 °C)   SpO2: 96%       GENERAL: NAD  MENTAL STATUS: Awake alert  HEENT:  NCAT  CARDIOVASCULAR: Sinus rhythm    CHEST:  hemodialysis access right chest  RESPIRATORY: Normal respirations.  Clear to auscultation without wheezes rales or rhonchi  ABDOMEN: Active bowel sounds, soft, nontender.     EXTREMITIES: Left thigh edema .  Edema at the left thigh appears similar..  Also has some edema in the left lower leg as well as right lower leg   Has diffuse tenderness to palpation about the left thigh.  No tenderness of the left calf    No myoclonus.  NEUROLOGIC:    Motor testing-takes resistance in the bilateral upper extremities and right lower extremity with left ankle dorsiflexion.  At least antigravity left knee extension but not fully tested secondary to pain inhibition      Scheduled Meds:aspirin, 81 mg, Oral, Daily  b complex-vitamin c-folic acid, 1 tablet, Oral, Daily  carvedilol, 25 mg, Oral, BID With Meals  cloNIDine, 0.2 mg, Oral, Q12H  epoetin brooke/brooke-epbx, 10,000 Units, Intravenous, Once per day on Mon Wed Fri  gabapentin, 100 mg, Oral, Q24H  gabapentin, 300 mg, Oral, BID  hydrALAZINE, 100 mg, Oral, Q8H  insulin glargine, 7 Units, Subcutaneous, QAM  insulin lispro, 0-7 Units, Subcutaneous, TID AC  levothyroxine, 200 mcg, Oral, Q AM  lidocaine, 1 patch, Transdermal, Q24H  losartan, 100 mg, Oral, Q24H  miconazole, 1 application, Topical, Q12H  pantoprazole, 40 mg, Oral, Q AM  rOPINIRole, 0.75 mg, Oral, Nightly  sertraline, 150 mg,  Oral, Daily  sodium zirconium cyclosilicate, 5 g, Oral, Daily  tamsulosin, 0.4 mg, Oral, Daily      Continuous Infusions:   PRN Meds:.•  acetaminophen  •  dextrose  •  dextrose  •  diphenoxylate-atropine  •  glucagon (human recombinant)  •  heparin (porcine)  •  hydrALAZINE  •  nitroglycerin  •  oxyCODONE  •  oxyCODONE  •  sodium chloride  •  zolpidem    Glucose   Date/Time Value Ref Range Status   07/13/2022 1041 285 (H) 70 - 130 mg/dL Final     Comment:     Meter: [id number] : [id number] [facility]   07/13/2022 0630 149 (H) 70 - 130 mg/dL Final     Comment:     Meter: [id number] : [id number] [facility]   07/13/2022 0302 122 70 - 130 mg/dL Final     Comment:     Meter: [id number] : [id number] [facility]   07/12/2022 2132 153 (H) 70 - 130 mg/dL Final     Comment:     Meter: [id number] : [id number] [facility]   07/12/2022 1604 159 (H) 70 - 130 mg/dL Final     Comment:     Meter: [id number] : [id number] Anup Martin    07/12/2022 1139 229 (H) 70 - 130 mg/dL Final     Comment:     Meter: [id number] : [id number] Anup Martin    07/12/2022 0629 196 (H) 70 - 130 mg/dL Final     Comment:     Meter: [id number] : [id number] Schneider Tawana VALDEZA   07/11/2022 2215 202 (H) 70 - 130 mg/dL Final     Comment:     Meter: [id number] : [id number] Schneider Tawana CNA     Results from last 7 days   Lab Units 07/12/22  1052 07/11/22  2325 07/10/22  0503   WBC 10*3/mm3 6.34 7.02 8.20   HEMOGLOBIN g/dL 8.0* 8.3* 7.9*   HEMATOCRIT % 25.2* 25.9* 24.9*   PLATELETS 10*3/mm3 149 156 170     Results from last 7 days   Lab Units 07/13/22  1053 07/12/22  1052 07/11/22  2325 07/10/22  0503   SODIUM mmol/L 129*  --  133* 133*   POTASSIUM mmol/L 4.6  --  4.2 4.4   CHLORIDE mmol/L 94*  --  99 96*   CO2 mmol/L 23.0  --  24.0 24.0   BUN mg/dL 35*  --  22* 34*   CREATININE mg/dL 3.63*  --  2.31* 3.17*   CALCIUM mg/dL 7.7*  --  8.3* 7.8*   BILIRUBIN mg/dL  --  0.5 0.5 0.4   ALK PHOS U/L  --   551* 571* 572*   ALT (SGPT) U/L  --  18 18 18   AST (SGOT) U/L  --  31 34* 32   GLUCOSE mg/dL 304*  --  202* 156*      Latest Reference Range & Units 05/27/22 11:30   Creatine Kinase 20 - 180 U/L 94   Aldolase 3.3 - 10.3 U/L 5.6       Imaging Results (Last 24 Hours)     ** No results found for the last 24 hours. **          ASSESSMENT AND PLAN    # R MCA s/p TPA with subsequent ICH with debility  Initially held antiplatelet/anticoagulation.  Reviewed with neurology on May 20 and resume aspirin 81 mg daily  SBP goal <160  Recommend outpatient Neurology fu - repeat MRI in 3 months        # DM  June 27-hyperglycemia on steroids.  Lantus increased to 30 units twice daily, Humalog 5 units 3 times daily with meals, and all increase sliding scale coverage  June 28 -hypoglycemic after increasing insulin dosing.  Blood glucose up to 98 prior to lunch, will hold sliding scale insulin and give 5 units scheduled with meal  June 30-hypoglycemia-Lantus twice daily decreased from 35 to 20 units.  Scheduled Humalog with meals discontinued  July 1-prednisone has been discontinued.  Reviewed with internal medicine and Lantus decreased to 10 units twice daily and on discharge home to resume her home regimen of Lantus 10 units daily.  She will check her sugar tonight at home and if elevated give Lantus 10 units tonight and then continue with the Lantus 10 units daily tomorrow.  Reviewed with patient and her  that her sugars may be elevated initially as she just completed prednisone.  They were instructed to call for any additional instructions on adjustment in regimen if it remains elevated at home this weekend  July 6-Lantus 7 units daily  July 7-blood sugar range 248-214- yesterday,  this AM     # ESRD   Nephrology following  Inpatient HD    Flomax  Hyperkalemia  July 5-patient had hemodialysis on July 3 and July 4 for hyperkalemia.  Lokelma resumed by nephrology  July 7-had hemodialysis yesterday and has plans for  hemodialysis again today  July 8-hemodialysis again today     Infectious disease-   # s/p catheter infection with MRSA  Vancomycin IV with stop date of May 26  May 24-vancomycin random equal 12.90-on vancomycin 500 mg IV on Awqenvl-Bdnekghb-Cshdslgf  May 26-to complete course of vancomycin today  #Urinary tract infection-on Dana 3 urinalysis was negative for infection but noted to have leukocytosis increase and repeat urinalysis on June 6 shows findings consistent with urinary tract infection.  Placed on a course of cefdinir renal dose 3 mg daily for 7 days.  No costophrenic angle tenderness patient reviewed with infectious disease service.  Leukocytosis felt related to the bladder infection and not previous catheter infection.  June 8-urine culture results pending.  On cefdinir.  Culture with E. coli, sensitivity pending  June 9 - E coli sensitive to cephalosporins.  June 21 - continues with leukocytosis WBC  15K today. May be from inflammatory process. Steroids added yesterday.   June 22-urine described as milky characteristic, different from previous-recheck urinalysis with culture if indicated  June 23-Labs are still pending today.  Urinalysis also pending as she does not make much urine.  She had a temperature of 100.2 last evening, afebrile this morning.  June 27 - abnormal UA but urine culture from June 24 no growth  July 1-leukocytosis felt related to the noninfectious process in the left thigh as well as secondary to steroids  July 5-WBC trending down to 15.6 today  July 6-WBC trending down to 12 K  July 7-WBC 8.4     #left hydroureteronephrosis-Dana 3-CT scan shows left hydro ureter nephrosis.  She had some previous dilation of the ureter on comparison the past studies but increased today.  Bladder noted to be markedly distended.  Will do in and out cath now and daily to decompress bladder.  Addendum-intermittent cath for 600 cc.  June 4-once daily intermittent cath 450 cc.  June 5-seen by urology-Gopi  catheter placed with plans to recheck hydroureter on renal ultrasound in 3 to 5 days.  June 6-Nguyễn catheter placed yesterday by urology, with only 170 cc out so far.  Patient reports did not note any change in her left groin pain after the in and out cath with decompression of the bladder..  June 8-reviewed with urology service-request noncontrast CT scan stone protocol to evaluate for resolution of the left hydronephrosis with urology planning to see tomorrow to review the films and then make recommendations, urology would recommend leaving the Nguyễn catheter in for the time being.  June 9 - improved left hydroureter on CT , Nguyễn discontinued.   July 1-to follow-up with urology as an outpatient with follow-up CT planned in August.  She occasionally required intermittent straight cath but this was very infrequent.  Home health nursing to follow.  Continues on Flomax  July 7-intermittent straight cath 400 cc  July 11-She continues with hydroureteronephrosis on the left side on follow-up CT scan-intermittent straight cath volumes have not been over about 400 cc over the past month.  Urology re-consulted for updated assessment  July 12-plan is intermittent straight cath routinely once or twice a day         #Anemia-  EPO.  June 6-hemoglobin 7.8  June 21- hemoglobin 8.3  June 27 - hemoglobin 8.7  June 28 - hemoglobin 8.8  July 6-hemoglobin 8.5     Lymphadenopathy-evaluated by hematology oncology while here.  No significant change from scans earlier this year.  She is to repeat a scan in 3 months and follow-up with hematology oncology  July 1116-[id number] CT of the abdomen pelvis shows lymphadenopathy overall about the same per her report.  To have follow-up with hematology oncology and repeat scan in another 2 months or so     #Elevated alkaline phosphatase  June 6-elevated alkaline phosphatase 770, up from 289 one week ago, 140 one month ago. Similar elevation in March, Feb even higher at 1,330 ( intra-abdominal fluid  collection, underwent CT-guided aspiration  Revealed blood and cultures did not reveal any growth and therefore antibiotics  discontinued.  Surgery also did evaluate and signed off.), and elevated during admission in January ( She was seen by general surgery who recommended and performed laparoscopic cholecystectomy during that admission).   ALT 70, AST 87, Total bilirubin 0.8. Ca 8.3.  ? If liver source or bone or due to an inflammatory response.  June 7-GGT also elevated.  Seen by gastroenterology.  Mead biliary source.  Liver ultrasound ordered  June 8-liver ultrasound was unremarkable  June 21 - patient declined liver biopsy.   June 27-gastroenterology following peripherally-plans to follow-up as an outpatient and review option of liver biopsy again if alkaline phosphatase remains elevated  June 28-remains elevated at 503  June 30-alkaline phosphatase lower at 426  July 6-trend back up to 634  July 8-alkaline phosphatase unchanged 627     #Pain - neuropathy BL lower extremity/left thigh pain  Gabapentin/oxycodone.    June 7-patient with lethargy this morning.  May be due to urinary tract infection but with recent increase and gabapentin and oxycodone, will change gabapentin to 200 mg twice a day and decrease oxycodone from 5 back down to 2.5 mg p.o. every 4 hours as needed  June 8-better speed with her processing today  June 21 - pain med regimen recently adjusted with tramadol 25 mg q 4 hours prn, gabapentin 300 g bid, lidoderm patch, oxycodone 2.5 mg q 6 hours prn. Prednisone 60 mg daily added which may help with pain from inflammatory process. Reports pain better today and participated better in therapies.   June 27 - continue present regimen  July 1-home on oxycodone 5 mg p.o. every 4 hours.  Pain dispense #40, gabapentin 300 mg twice daily, Tylenol 500 mg every 6 hours as needed  July 5-increase gabapentin slightly 300-100-300 mg.  Watch for any myoclonic twitching  July 8-had some lethargy earlier today,  patient feels that she is tolerating oxycodone and gabapentin.  Was alert when seen on rounds.        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  Seen by infectious disease service with no acute infectious process noted.  Evaluated by hematology regarding lymphadenopathy, lymph node felt too small to biopsy.  Patient was on a course of low-dose prednisone 10 mg for 3 days then 5 mg for 2 days and then another 10 mg dose with out any significant improvement in the swelling or pain.  She has a history of rheumatoid arthritis.  See CT of the left thigh and MRI of the left thigh and pelvis reports   With lumbar radiculoplexitis and diabetic amyotrophy, on review of the literature do not see edema that she presents with associated with the findings or MRI changes in the tendon and musculature with edema.  There is descriptions of MRI changes in the plexus and peripheral nerve.  In terms of treatment options for diabetic amyotrophy , there have not been definitive clinical trials.  Immunomodulation with steroids has been inconclusive as well as other immunomodulation therapy.  Reviewed with the patient  that  feel that she would benefit from seeing her rheumatologist as an outpatient to evaluate this further, as there does appear to be inflammatory cause given the lymphadenopathy and edema.  Rheumatologist does not come to the hospital.  CPK x2 has been normal.  Aldolase has been normal.  May need to look at biopsy of left thigh muscle to assess further.   June 8-patient reviewed with neurology yesterday who  will assess and also provide input regarding whether muscle biopsy may be helpful.  June 9 - Discussed with Neurology and Rheumatology - plan is for EMG and then muscle biopsy.   Dana 15 - Left quadricep muscle biopsy was completed 6/15, results pending.  Labs: CKs have been normal, ANCA panel 6/11 was unremarkable  June 21 - started on full treatment dose of Prednisone 60 mg daily yesterday pending path results. Reports pain better so far today. Specimen sent to [facility]. Clinical impression presently focal inflammatory myositis pending final pathology results.   June 22-pain continues better today.  June 23-outside pathology report still pending  June 24 - Patient reports left thigh pain better over past couple days but still present. Does not have the soreness at medial thigh as before. Complains of pain at mid-thigh. No change in swelling. Does have discomfort with proximal movement in LLE. Walked 320 feet CTG SBA RW today.   Pain improved on steroid. Discontinued atorvastatin. Biopsy results returned from [facility] - see report -Type 2 fiber atrophy, advanced. Denervation/reinnervation. No evidence of inflammatory myopathy or vasculitis. [facility] lab pursuing a dystrophy panel and will report findings in an addendum.  Reviewed with Neurology - as shown symptomatic response, continue Prednisone 60 mg daily for about one more week, then taper off over month.   June 27 - continue present regimen  June 28-increased pain medication regimen to oxycodone 5 mg every 4 hours as needed for severe pain.   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 Treatment would be aspirin which she is already on. Discussed with Neurology and as been on Prednisone 60 mg for only 1.5 week, will stop and not do taper. Discussed with Internal Medicine who will adjust insulin.  She is to resume her home insulin regimen after discharge which is  Lantus 10 units daily  Present clinical impression is diabetic muscular infarct.  Reviewed with the patient that treatment for this is aspirin which she is already on.  She may do walking activities but would avoid strenuous activities with left quadricep strengthening.  She may strengthen the other 3 extremities as tolerated.  Reviewed may take 3 months to resolve.  July 7-continues with left thigh pain.  She is noted to have increased edema in both lower extremities but particularly the left thigh compared to recent.  Lokelma has been associated with fluid retention which may explain increase edema in part.  Continues on aspirin for suspected diabetic muscular infarct.  No update report yet on additional studies for muscle biopsy.  Initial report was June 24 with additional studies planned  July 11-updated MRI imaging of the left thigh this past weekend.  See report.  She has appointment with rheumatology for further assessment later this week. -will recheck CK level for updated baseline as about 4 weeks out now from her biopsy which could have affected level.  Previous CK levels were not elevated.  White blood cell count has normalized which would hold against any active infection.  Edema at the left thigh appears about the same.  Continues with pain on the left side with pain inhibition with proximal muscles.  July 12-CPK was 39 yesterday.  Patient reviewed with pathologist at [facility] today-no additional results yet-they are to call when have additional results    # HTN   Coreg  Clonidine  Hydralazine  Losartan  Nephrology/internal medicine following     #Hypothyroidism  Levothyroxine  June 9 - recheck TSH- addendum Dana 10- On Nov 30, 2021 , on Dec 2, 2021 T4 = 0.90, on Dec 9, T4=1.68. On May 7, TSH = 134. Has been on Levothyroxine 125 mcg/day it appears since at least Dec 13, 2021. See Dr Templeton's discharge note from Dec 17,2021 which discusses thyroid labs/dosing at that time. On June 9,  2022 TSH = 54.4.   Check free T4. Will get evaluation from Internal Medicine regarding thyroid studies/levothyroxine dosing.  June 11 - levothyroxine increased to 150mcg daily-continue this dose and she will need follow up TSH in 4-6 weeks from June 11 as an outpatient  July 11-repeat TSH and T4 ordered for Monday, July 11 July 12-TSH still elevated, higher at 96.  Levothyroxine increased to 200 mcg daily and separate from other meds.  Free T4 equal 0.68     #CAD  ASA held in setting of ICH - restart aspirin 81 mg daily on May 20, 2022 per Neurology     #Depression   Sertraline  June 28-sertraline dose increased  July 5-reviewed with psychiatry service-continue present regimen    Insomnia-improved on Ambien 5 mg  July 6-fatigue during the day-decrease Ambien 2.5 mg nightly and assess response  July 7-tolerated Ambien 2.5    #GERD   PPI     #Restless leg syndrome  Requip     #DVT prophylaxis-SCDs ordered but patient refusing.  Per neurology note May 11-continue off anticoagulation/antiplatelet for now. Restarted ASA 81 mg on May 20.  May 16-reviewed with patient and try venous foot compression devices  May 20-also not able to tolerate venous foot compression devices.  May 25-bilateral lower extremity venous duplex negative for DVT  July 9-  venous duplex negative for DVT left lower extremity    Diarrhea-July 7-loose stool x5 over the last day.  Lokelma is not associated with diarrhea.  C. difficile was negative on July 5.  Was on cefdinir from June 7 to June 13 and cefazolin one-time dose on Dana 15.  - will recheck C. Difficile  July 11-C. difficile was negative x2.  Diarrhea resolved        -  comprehensive inpatient rehabilitation program working with PT, OT, SLP for a minimum of three hours per day, five days per week. - will monitor for progress     TEAM CONF - MAY 17- BED SBA. TRANSFER MIN. 4 STAIRS MIN. GAIT 160 FEET CTG RW. SLOW TO PROCESS. BATH MIN. LBD MIN. UBD MIN. GROOMING SET UP. TOILETING MIN .  COGNITION OVERALL MILD DEFICITS. VISUAL IMPAIRMENT IMPACTS SOME TESTING. ESRD-HD. ANTIBIOTICS - VANCOMYCIN 10.90 YESTERDAY.  [facility] - 1-2 WEEKS.      TEAM CONF - MAY 24 - ALWAYS SO PROCESSING AFTER EACH DIALYSIS SESSION. AT HOME WOULD GO BACK TO HOME AND SLEEP. BED MIN ASSIST. TRANSFERS MIN. GAIT 80   FEET RW CTG. 4 STAIRS CTG. TOILET TRASNFERS AND SHOWER TRANSFERS MIN CTG. BATH CTG. LBD CTG. UBD SET UP. GROOMING SET UP. TOILETING CTG.   LEFT GROIN - ADDUCTOR PAIN - There is mild joint space narrowing involving both hips symmetrically. There are also some minimal degenerative changes involving both hips. The overall appearance is similar to the study of 12/16/2021. MODERATE WORD RETRIEVAL DEFICITS. IMPAIRED VISION AFFECTS HOME MANAGEMENT TASKS. HYPOGLLYCEMIA AFTER SSI .DECREASED TO 0-7 UNITS.   [facility] - ONE WEEK.         TEAM CONF - MAY 31 - TRANSFERS CTG. GAIT 40- FEET CTG RW LIMITED BY THIGH PAIN. TOILET TRANSFERS CTG. BATH CTG. LBD CTG. UBD SET UP. TOILETING CTG.  GROOMING SET UP. COGNITION - MODERATE WORD RETRIEVAL DEFICITS, MODERATE IMPAIRED MEMORY IMPACTED BY FATIGUE, STILL SLOW PROCESSING.  BNE (Active)  Att'n. - Mildly Imp.  Exec. Fx. - Mildly Imp., slowed processing speed  Rsng/Jgmnt - WNL  Arith - Mod Imp.  Visuospatial Skills - DNA, pt declined citing poor vision  Visual Mem. DNA  Verbal Mem. - WNL  Emot - Pt endorsed mild dysphoria and anxiousness secodnary to current inpatient  Stay.  CONTINENT BOWEL. OCCASIONAL VOID. ESRD-HD. ON INSULIN. SKIN INTACT. LIDODERM PATCH TO LEFT ADDUCTOR TENDON. COURSE OF STEROID FOR LEFT THIGH JEREMI. CONSULTED ORTHO FOR INPUT.  [facility] - POSSIBLY Thursday DEPENDING ON FURTHER EVALUATIONS.            TEAM CONF - JUNE 21 -  DECLINED THERAPY DUE TO PAIN.  AT MOST RECENT THERAPY WHEN PARTICIPATED, TRANSFERS MIN. GAIT 80 FEET MIN / CTG MIN ASSIST RW. TOILET TRANSFERS CTG. TOILETING CTG. MODERATE IMPAIRED VERBAL MEMORY. PAIN MANAGEMENT - MEDS ADJUSTED - OXYCODONE 2.5 MG Q 6  HOURS PRN, TRAMADOL 25 MG Q 4 HOURS. MUSCLE BIOPSY RESULTS PENDING. STARTING ON PREDNISONE 60 MG DAILY YESTERDAY. WILL NEED TO ADJUST INSULING, BLOOD GLUCOSE > 300 THIS AM. GASTROENTEROLOGY EVALUATING LIVER. PATIENT DECLINES LIVER BIOPSY.  HYPOTHYROID - levothyroxine increased to 150mcg daily-continue this dose and she will need follow up TSH in 4-6 weeks from June 11 as an outpatient  ELOS -   1.5 WEEKS     TEAM CONF - June 28 - BED MIN  SIT TO SUPINE, SBA SUPINE TO SIT. CAR TRANSFERS CTG. TRANSFERS MIN ASSIST. 4 STAIRS MIN. GAIT 240 FEET CTG SBA. TOILET TRANSFERS MIN. UBB SBA. LBB MIN WITH LLE. UBD SET UP. LBD MOD ASSIST LLE.   INSULIN ADJUSTED FOR ELEVATED BLOOD GLUCOSE ON STEROIDS. ON PREDNISONE 60 MG DAILY AND PLAN TO TAPER OVER NEXT MONTH . BIOPSY REPORT SENT TO OUTPATIENT RHEUMATOLOGIST YESTERDAY. ADDITIONAL STUDIES ON BIOPSY PENDING.   BLADDER SCAN 400 CC BUT ONLY 200 CC ON INTERMITTENT STRAIGHT CATH. LEFT QUAD MUSCLE BIOPSY SITE HEALING.  ESRD - HD.   [facility]OS - Thursday WITH HOME HEALTH PT, OT, AND NURSING FOR DIABETES AND MONITORING ON STEROIDS.   Addendum-was not able to do car transfer after hemodialysis on Friday, July 1 and continued with inpatient rehab course.    TEAM CONF - July 12 - BED MOD. TRASNFER MIN, AT TIMES MIN-MOD. GAIT CTG RW 60 FEET. LAST WORKED ON STAIRS July 2 4 STAIRS MIN ASSIST. TOILET TRANSFERS MIN. UBB SBA. LBB MIN MOD. UBD SET UP. LBD MOD MAX. OXYCODONE AND GABAPENTIN FOR PAIN. LEFT HYDROURETERNEPHROSIS. - CATHETERIZE ONCE A DAY. ON FLOMAX. HAS A RASH ON BACK/BUTTOCK - MICONAZOLE CREAM. APPOINTMENT WITH DR Cortez - RHEUMATOLOGY - ON Friday July 15. CK = 39 YESTERDAY. THYROID RECHECK - TSH 96.1 AND FREE T4 0.68 YESTERDAY - INTERNAL MEDICINE FOLLOWING.   [facility] - LOOKING AT SUBACUTE OPTIONS.     Rehabilitation-July 7-Patient has not been able to participate in therapy for 3 hours a day.  Will ask internal medicine to assess for transfer to the acute care wing in the hospital  July 8-Was  evaluated by internal medicine service who did not feel that she needed transfer to the acute care wing of the hospit    Adonis Florentino MD       During rounds, used appropriate personal protective equipment including mask and gloves.  Additional gown if indicated.  Mask used was standard procedure mask. Appropriate PPE was worn during the entire visit.  Hand hygiene was completed before and after.

## 2022-07-13 NOTE — DISCHARGE PLACEMENT REQUEST
"Zaina Martinez (56 y.o. Female)             Date of Birth   1965    Social Security Number       Address   [address] [place] [address]    Home Phone   [phone]    MRN   [id number]       Mormon   None    Marital Status                               Admission Date   5/13/22    Admission Type   Elective    Admitting Provider   Adonis Florentino MD    Attending Provider   Adonis Florentino MD    Department, Room/Bed   [facility], 4406/1       Discharge Date       Discharge Disposition   Short Term Hospital ([facility])    Discharge Destination                               Attending Provider: Adonis Florentino MD    Allergies: Contrast Dye, Ibuprofen, Prochlorperazine    Isolation: Contact   Infection: MRSA (04/29/22)   Code Status: CPR   Advance Care Planning Activity    Ht: 157.5 cm (62.01\")   Wt: 75.5 kg (166 lb 7.2 oz)    Admission Cmt: None   Principal Problem: None                Active Insurance as of 5/13/2022     Primary Coverage     Payor Plan Insurance Group Employer/Plan Group    ANTH MEDICARE REPLACEMENT ANTH MEDICARE ADVANTAGE [id number]     Payor Plan Address Payor Plan Phone Number Payor Plan Fax Number Effective Dates    [address] [phone]  1/1/2019 - None Entered    [facility] [id number]       Subscriber Name Subscriber Birth Date Member ID       ZAINA MARTINEZ 1965 [id number]                 Emergency Contacts      (Rel.) Home Phone Work Phone Mobile Phone    Marv Martinez (Spouse) -- -- [phone]    JuanFiona bradford (Daughter) -- -- [phone]              "

## 2022-07-13 NOTE — PROGRESS NOTES
Inpatient Rehabilitation Plan of Care Note    Plan of Care  Care Plan Reviewed - No updates at this time.    Body Function Structure    [RN] Skin Integrity(Active)  Current Status(07/14/2022): Rash to back and lower buttocks.  Weekly Goal(07/21/2022): Rash to resolve.  Discharge Goal: No skin problem.    Signed by: Jessica Sandhu RN

## 2022-07-13 NOTE — PROGRESS NOTES
Inpatient Rehabilitation Plan of Care Note    Plan of Care  Care Plan Reviewed - No updates at this time.    Body Function Structure    [RN] Skin Integrity(Active)  Current Status(07/13/2022): Rash to back and lower buttocks.  Weekly Goal(07/19/2022): Rash to resolve.  Discharge Goal: No skin problem.        Body Systems    [RN] Genitourinary(Active)  Current Status(07/13/2022): Patient is a M,W,F HD patient with a tunneled  catheter to the R chest. Pt produces little urine.  pt oliguric. New cath order  6/20: straight cath daily and prn until cath residual < 300 ml x3 days.  Weekly Goal(07/19/2022): Pt will plan to transition to community dialysis.  Discharge Goal: Pt will transition to community dialysis.    [RN] Endocrine(Active)  Current Status(07/13/2022): Patient is a diabetic.  Weekly Goal(07/19/2022): Blood glucose will be controlled.  Discharge Goal: Patient will have medication regimen that she can manage at  home.    Performed Intervention(s)  Dialysis mwf  Monitor weight and I/O.  DM educator consult.  Accuchecks as ordered.      Pain    [RN] Pain Management(Active)  Current Status(07/13/2022): Pt has consistent pain to L groin/L medial upper  thigh . Muscle biopsy site to L thigh .  Weekly Goal(07/19/2022): Pain will be controlled.  Discharge Goal: Pt will have pain management regimen that she can follow at  home.    Performed Intervention(s)  Pain meds prn .  Ice to LLE per order      Psychosocial    [RN] Coping/Adjustment(Active)  Current Status(07/13/2022): Pt is A/Ox4. Pt is at increased risk for reduced  coping r/t hospitalization and comorbidities. Pt has been calling for assistance  appropriately as needed. Pt has supportive family.  Weekly Goal(07/19/2022): Pt will verbalize needs, feelings, concerns to staff as  needed.  Discharge Goal: Pt will verbalize adequate coping strategies to use at home.    Performed Intervention(s)  Provide distraction and/or relaxation as needed.  Allow extra time  for patient to verbalize needs, concerns, feelings, etc.      Safety    [RN] Potential for Injury(Active)  Current Status(07/13/2022): Patient is at increased risk for falls/injury r/t  hospitalization and generalized weakness.  Weekly Goal(07/19/2022): Patient will call appropriately for assistance as  needed.  Discharge Goal: Patient will remain free from falls/injury.    Performed Intervention(s)  Fall precautions: bed low and locked, chair alarm and bed alarms in use, nonskid  socks and gait belt in use, call light and personal belongings within reach.  Hourly rounding.      Sphincter Control    [RN] Bowel and bladder (Active)  Current Status(07/13/2022): Patient can be continent and is often incontinent of  stool. Patient is oliguric and is an HD patient. New cath order 6/20.  Weekly Goal(07/19/2022): Pt will be continent 75% of the time. Patient will have  BM at least q3 days or within normal patterns.  Discharge Goal: Patient will be continent 100% of the time.    Performed Intervention(s)  Monitor I/O.  Bowel meds prn.    Signed by: Mona Marie RN

## 2022-07-13 NOTE — PROGRESS NOTES
[facility] Progress Note      Patient Name: Zaina Martinez  : 1965  MRN: [id number]  Primary Care Physician:  Karson Oreilly MD  Date of admission: 2022    Subjective     Interval History:   Follow up ESRD. Up in wheelchair.  Therapy going better. Eating. Last bm recorded .      Review of Systems:   As noted above    Objective     Vitals:   Temp:  [97.5 °F (36.4 °C)-97.9 °F (36.6 °C)] 97.5 °F (36.4 °C)  Heart Rate:  [50-54] 50  Resp:  [16-18] 16  BP: (120-172)/(65-75) 172/75    Intake/Output Summary (Last 24 hours) at 2022 0959  Last data filed at 2022 0830  Gross per 24 hour   Intake 924 ml   Output 100 ml   Net 824 ml       Physical Exam:   General: sitting up in wheelchair.  Conversant.  Speech more fluent than last time I saw her.   HEENT: oral mucosa moist.   Neck:RIJ TDC  Lungs:clear to auscultation. Unlabored.    Heart:RRR no s3 or rub.  early syst m  Abdomen: + bs,soft, nontender   Extremities:1+  lower ext edema.    Neuro: speech more fluent . Moves all 4 ext.     Scheduled Meds:     aspirin, 81 mg, Oral, Daily  b complex-vitamin c-folic acid, 1 tablet, Oral, Daily  carvedilol, 25 mg, Oral, BID With Meals  cloNIDine, 0.2 mg, Oral, Q12H  epoetin brooke/brooke-epbx, 10,000 Units, Intravenous, Once per day on   gabapentin, 100 mg, Oral, Q24H  gabapentin, 300 mg, Oral, BID  hydrALAZINE, 100 mg, Oral, Q8H  insulin glargine, 7 Units, Subcutaneous, QAM  insulin lispro, 0-7 Units, Subcutaneous, TID AC  levothyroxine, 200 mcg, Oral, Q AM  lidocaine, 1 patch, Transdermal, Q24H  losartan, 100 mg, Oral, Q24H  miconazole, 1 application, Topical, Q12H  pantoprazole, 40 mg, Oral, Q AM  rOPINIRole, 0.75 mg, Oral, Nightly  sertraline, 150 mg, Oral, Daily  sodium zirconium cyclosilicate, 5 g, Oral, Daily  tamsulosin, 0.4 mg, Oral, Daily      IV Meds:        Results Reviewed:   I have personally reviewed the results from the time of this admission to  7/13/2022 09:59 EDT     Results from last 7 days   Lab Units 07/12/22  1052 07/11/22  2325 07/10/22  0503 07/09/22  0710   SODIUM mmol/L  --  133* 133* 131*   POTASSIUM mmol/L  --  4.2 4.4 3.8   CHLORIDE mmol/L  --  99 96* 96*   CO2 mmol/L  --  24.0 24.0 24.6   BUN mg/dL  --  22* 34* 22*   CREATININE mg/dL  --  2.31* 3.17* 2.17*   CALCIUM mg/dL  --  8.3* 7.8* 7.6*   BILIRUBIN mg/dL 0.5 0.5 0.4 0.5   ALK PHOS U/L 551* 571* 572* 597*   ALT (SGPT) U/L 18 18 18 21   AST (SGOT) U/L 31 34* 32 35*   GLUCOSE mg/dL  --  202* 156* 167*       Estimated Creatinine Clearance: 25.9 mL/min (A) (by C-G formula based on SCr of 2.31 mg/dL (H)).    Results from last 7 days   Lab Units 07/11/22  2325 07/10/22  0503 07/09/22  0710   PHOSPHORUS mg/dL 3.0 3.9 3.1             Results from last 7 days   Lab Units 07/12/22  1052 07/11/22 2325 07/10/22  0503 07/09/22  0711 07/08/22  0905   WBC 10*3/mm3 6.34 7.02 8.20 7.81 8.42   HEMOGLOBIN g/dL 8.0* 8.3* 7.9* 8.1* 8.1*   PLATELETS 10*3/mm3 149 156 170 173 175             Assessment / Plan     ASSESSMENT:  1.    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 HD MWF.   2.  Intracerebral bleed and altered mental status.  Rehab .   3.  Infected TDC, s/p removal 5/1. Replaced. Staph aureus . completed vancomycin.  4.  DM2    5.  Coronary artery disease  6.  Acute on chronic diastolic dysfunction . Volume off with HD.   7.  Anemia of CKD, on long-acting ANDRAE as an outpatient. Hg stable yesterday .  On procrit .  8. Hypothyroidism.  TSH still very high and Free t4 low .  LHA increased replacement .  9. 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 On ASA.   PLAN:  1.  HD today. Remove volume.   2. MWF labs would be fine with renal .  Ruth Lira MD  07/13/22  09:59 EDT    [facility]  [phone]

## 2022-07-13 NOTE — PLAN OF CARE
Goal Outcome Evaluation:  Plan of Care Reviewed With: patient        Progress: improving  Outcome Evaluation: Pt had dialysis today. Pain meds given for pain. BP elevated. Scheduled Coreg don.

## 2022-07-13 NOTE — PLAN OF CARE
Goal Outcome Evaluation:  Plan of Care Reviewed With: patient             Problem: Rehabilitation (IRF) Plan of Care  Goal: Plan of Care Review  Outcome: Ongoing, Progressing  Flowsheets  Taken 7/13/2022 0228 by Mona Marie RN  Outcome Evaluation:  Zaina is alert and oriented x 4. Takes meds whole PO with thin liquids. Continues on fluid restrictions of 1500cc/ daily. Continent of bowel. Wears brief at night. Oliguric; on HD MWF. Bladder scanned q shift and cath if >300cc. Pt continues to complain of left thigh discomfort. PRN Vanessa 5mg administered at 2119. Able to turn self while in bed. Light pink rash noted to lower back. Has rested well this evening with eyes closed. Will have HD tomorrow. Call light within reach.

## 2022-07-13 NOTE — NURSING NOTE
HD complete, 2.5 liters removed.  CVC dressing changed and dated. Post /88 HR 55 T 97.5. Patient tolerated treatment well.

## 2022-07-13 NOTE — THERAPY TREATMENT NOTE
Inpatient Rehabilitation - Occupational Therapy Treatment Note    [facility]     Patient Name: Zaina Martinez  : 1965  MRN: [id number]    Today's Date: 2022                 Admit Date: 2022         ICD-10-CM ICD-9-CM   1. Generalized weakness  R53.1 780.79   2. Diabetic muscle infarction ([facility])  E11.69 250.80    M62.20 728.89   3. Other insomnia  G47.09 780.52       Patient Active Problem List   Diagnosis   • Renal insufficiency   • Hypertensive disorder   • Hypothyroidism   • Type 2 diabetes mellitus with kidney complication, with long-term current use of insulin ([facility])   • Rheumatoid arthritis ([facility])   • Angioedema   • Esophageal dysmotility   • Anemia   • Medically noncompliant   • Myocardial infarction due to demand ischemia ([facility])   • Enteritis   • PRES (posterior reversible encephalopathy syndrome)   • Urine retention   • Klebsiella infection   • Superficial thrombophlebitis   • Generalized weakness   • ESRD (end stage renal disease) ([facility])   • CAD (coronary artery disease)   • Abnormal urinalysis   • Chronic diastolic CHF (congestive heart failure) ([facility])   • Pyelonephritis   • Calculus of gallbladder with acute on chronic cholecystitis without obstruction   • Pleural effusion on right   • Anemia due to chronic kidney disease, on chronic dialysis ([facility])   • Abnormal findings on diagnostic imaging of other specified body structures   • Acute upper respiratory infection   • Agitation   • Alkaline phosphatase raised   • Casts present in urine   • Cellulitis of toe   • Hip pain   • Community acquired pneumonia   • Depressive disorder   • Diarrhea of presumed infectious origin   • Difficult or painful urination   • Disease due to severe acute respiratory syndrome coronavirus 2 (SARS-CoV-2)   • Dyspnea   • Encounter for follow-up examination after completed treatment for conditions other than malignant neoplasm   • H/O: hypothyroidism   • Hyperlipidemia   • Hypomagnesemia   • Intractable vomiting with  nausea   • Leukocytosis   • Luetscher's syndrome   • Need for influenza vaccination   • Restless legs   • Noncompliance with treatment   • Shoulder pain   • Urinary tract infectious disease   • Metabolic encephalopathy   • Abnormal findings on diagnostic imaging of abdomen   • Status post cholecystectomy   • Hyponatremia   • Leukocytosis   • Acute metabolic encephalopathy   • Encephalopathy, toxic   • Acute CVA (cerebrovascular accident) ([facility])   • Intracranial hemorrhage (HCC)   • Stroke (HCC)   • Abnormal urinalysis   • Diabetic muscle infarction ([facility]CC)   • Other insomnia       Past Medical History:   Diagnosis Date   • Acute CVA (cerebrovascular accident) (HCC) 5/1/2022   • Acute on chronic diastolic CHF (congestive heart failure) (HCC)    • CAD (coronary artery disease) 12/20/2021   • Diabetes (HCC)    • Disease of thyroid gland    • GERD (gastroesophageal reflux disease)    • Hyperlipidemia 11/30/2018   • Hypertension    • Rheumatoid arthritis (HCC)        Past Surgical History:   Procedure Laterality Date   • CHOLECYSTECTOMY WITH INTRAOPERATIVE CHOLANGIOGRAM N/A 1/10/2022    Procedure: Laparoscopic cholecystectomy with intraoperative cholangiogram;  Surgeon: Ramana Raygoza MD;  Location: [facility];  Service: General;  Laterality: N/A;   • EYE SURGERY     • HYSTERECTOMY     • INSERTION HEMODIALYSIS CATHETER N/A 12/6/2021    Procedure: HEMODIALYSIS CATHETER INSERTION;  Surgeon: Keli Salazar MD;  Location: [facility] 18/19;  Service: Vascular;  Laterality: N/A;   • INSERTION HEMODIALYSIS CATHETER N/A 5/3/2022    Procedure: TUNNELED CATHETER PLACEMENT;  Surgeon: Keli Salazar MD;  Location: [facility] OR;  Service: Vascular;  Laterality: N/A;   • MUSCLE BIOPSY Left 6/15/2022    Procedure: Left quadriceps muscle biopsy;  Surgeon: Marques Ma MD;  Location: [facility] OR;  Service: Neurosurgery;  Laterality: Left;             IRF OT ASSESSMENT FLOWSHEET (last 12 hours)     IRF OT  Evaluation and Treatment     Row Name 07/13/22 1508 07/13/22 1051       OT Time and Intention    Document Type daily treatment  -AF daily treatment  -AF    Mode of Treatment occupational therapy  -AF occupational therapy  -AF    Patient Effort good  -AF --    Symptoms Noted During/After Treatment increased pain  -AF --    Row Name 07/13/22 1508 07/13/22 1051       General Information    Patient/Family/Caregiver Comments/Observations pt sitting up in w/c after PT session  -AF pt sitting up in bed  -AF    General Observations of Patient -- daughter and grandson present for teaching this morning  -AF    Existing Precautions/Restrictions fall  contact precautions  -AF fall  contact precautions  -AF    Row Name 07/13/22 1508 07/13/22 1051       Pain Assessment    Pretreatment Pain Rating 4/10  -AF 4/10  -AF    Posttreatment Pain Rating 4/10  -AF 4/10  -AF    Pain Location - Side/Orientation -- Left  -AF    Pain Location - -- groin  -AF    Row Name 07/13/22 1508 07/13/22 1051       Cognition/Psychosocial    Affect/Mental Status (Cognition) flat/blunted affect  -AF flat/blunted affect  -AF    Orientation Status (Cognition) oriented x 3  -AF oriented x 3  -AF    Follows Commands (Cognition) follows one-step commands  -AF follows one-step commands  -AF    Personal Safety Interventions fall prevention program maintained;gait belt;nonskid shoes/slippers when out of bed  -AF fall prevention program maintained;gait belt;nonskid shoes/slippers when out of bed  -AF    Row Name 07/13/22 1051          Bathing    [place] Level (Bathing) bathing skills;upper body;set up  -AF     Position (Bathing) sink side;supported sitting  -AF     Row Name 07/13/22 1051          Upper Body Dressing    [place] Level (Upper Body Dressing) upper body dressing skills;set up assistance  -AF     Position (Upper Body Dressing) supported sitting  -AF     Set-up Assistance (Upper Body Dressing) obtain clothing  -AF     Row Name 07/13/22 150  07/13/22 1051       Lower Body Dressing    [place] Level (Lower Body Dressing) -- doff;don;pants/bottoms;shoes/slippers;socks;maximum assist (25% patient effort)  -AF    Position (Lower Body Dressing) -- supported sitting;supported standing  -AF    Comment (Lower Body Dressing) DEP to doff shoes  -AF w/c level  -AF    Row Name 07/13/22 1051          Grooming    [place] Level (Grooming) grooming skills;verbal cues;deodorant application;hair care, combing/brushing;oral care regimen;set up  -AF     Position (Grooming) supported sitting  -AF     Set-up Assistance (Grooming) obtain supplies  -AF     Comment (Grooming) w/c level  -AF     Row Name 07/13/22 1508 07/13/22 1051       Bed Mobility    Supine-Sit [place] (Bed Mobility) -- standby assist  -AF    Sit-Supine [place] (Bed Mobility) minimum assist (75% patient effort);verbal cues  -AF --    Assistive Device (Bed Mobility) bed rails  -AF bed rails  -AF    Row Name 07/13/22 1051          Transfer Assessment/Treatment    Transfers bathtub transfer  -AF     Comment, (Transfers) sit to stand with LBD ADLs at sink CGA  -AF     Row Name 07/13/22 1508 07/13/22 1051       Transfers    Bed-Chair [place] (Transfers) -- contact guard  -AF    Chair-Bed [place] (Transfers) maximum assist (25% patient effort);moderate assist (50% patient effort)  squat pivot  -AF --    Assistive Device (Bed-Chair Transfers) -- wheelchair;walker, front-wheeled  -AF    [place] Level (Bathtub Transfer) -- minimum assist (75% patient effort)  -AF    Assistive Device (Bathtub Transfer) -- tub bench;wheelchair;walker, front-wheeled  A with swining legs in and out of the bath tub  -AF    Row Name 07/13/22 1051          Bed-Chair Transfer    Comment, (Bed-Chair Transfer) increased time  -AF     Row Name 07/13/22 1508          Chair-Bed Transfer    Assistive Device (Chair-Bed Transfers) wheelchair  -AF     Row Name 07/13/22 1051          Bathtub Transfer    Type (Bathtub  Transfer) lateral;stand pivot/stand step  -AF     Comment, (Bathtub Transfer) daughter and grandtala observed dry run transfer to TTB  -AF     Row Name 07/13/22 1051          Motor Skills    Functional Endurance fair plus  -AF     Row Name 07/13/22 1508          Shoulder (Therapeutic Exercise)    Shoulder Strengthening (Therapeutic Exercise) bilateral;external rotation;internal rotation;scapular stabilization;sitting;2 lb free weight;15 repititions;2 sets  -AF     Row Name 07/13/22 1508          Elbow/Forearm (Therapeutic Exercise)    Elbow/Forearm Strengthening (Therapeutic Exercise) bilateral;flexion;extension;supination;pronation;sitting;2 lb free weight;15 repititions;2 sets  -AF     Row Name 07/13/22 1508          Hand (Therapeutic Exercise)    Hand Strengthening (Therapeutic Exercise) bilateral; strengthening;hand gripper;20 repititions;2 sets  -AF     Row Name 07/13/22 1508 07/13/22 1051       Balance    Static Sitting Balance -- independent  -AF    Static Standing Balance -- contact guard  -AF    Comment, Balance unable to come to stand to compelte transfer from w/c to EOB reuqired MAX A sit pivot tranfser  -AF assisted with pulling up her pants  -AF    Row Name 07/13/22 1508 07/13/22 1051       Positioning and Restraints    Pre-Treatment Position sitting in chair/recliner  -AF in bed  -AF    Post Treatment Position bed  -AF wheelchair  -AF    In Bed supine;call light within reach;encouraged to call for assist;exit alarm on;notified nsg  -AF --    In Wheelchair -- sitting;exit alarm on;with PT  with daughter and vladimir present  -AF          User Key  (r) = Recorded By, (t) = Taken By, (c) = Cosigned By    Initials Name Effective Dates    AF Tasha Helm, OTR 06/16/21 -                  Occupational Therapy Education                 Title: PT OT SLP Therapies (In Progress)     Topic: Occupational Therapy (Done)     Point: ADL training (Done)     Description:   Instruct learner(s) on proper safety  adaptation and remediation techniques during self care or transfers.   Instruct in proper use of assistive devices.              Learning Progress Summary           Patient Acceptance, E,TB,D, VU,NR by  at 6/25/2022 1434      Show all documentation for this point (2)                 Point: Home exercise program (Done)     Description:   Instruct learner(s) on appropriate technique for monitoring, assisting and/or progressing therapeutic exercises/activities.              Learning Progress Summary           Patient Acceptance, E,D,H, VU by AF at 6/30/2022 1544    Comment: review of HEP with hand weights and theraband      Show all documentation for this point (2)                 Point: Precautions (Done)     Description:   Instruct learner(s) on prescribed precautions during self-care and functional transfers.              Learning Progress Summary           Patient Acceptance, E,TB,D, VU,NR by  at 6/25/2022 1434      Show all documentation for this point (2)                 Point: Body mechanics (Done)     Description:   Instruct learner(s) on proper positioning and spine alignment during self-care, functional mobility activities and/or exercises.              Learning Progress Summary           Patient Acceptance, E,TB,D, VU,NR by  at 6/25/2022 1434      Show all documentation for this point (2)                             User Key     Initials Effective Dates Name Provider Type Discipline     06/16/21 -  Fayn Lima, PT Physical Therapist PT     06/16/21 -  Tasha Helm OTR Occupational Therapist OT                    OT Recommendation and Plan                         Time Calculation:      Time Calculation- OT     Row Name 07/13/22 1514 07/13/22 1057          Time Calculation- OT    OT Start Time 1300  -AF 0830  -AF     OT Stop Time 1345  -AF 0930  -AF     OT Time Calculation (min) 45 min  -AF 60 min  -AF           User Key  (r) = Recorded By, (t) = Taken By, (c) = Cosigned By    Initials Name  Provider Type    AF Tasha Helm, OTJANICE Occupational Therapist              Therapy Charges for Today     Code Description Service Date Service Provider Modifiers Qty    11761551772 HC OT SELF CARE/MGMT/TRAIN EA 15 MIN 7/12/2022 Tasha Helm, OTR GO 4    38273834393 HC OT THER PROC EA 15 MIN 7/12/2022 Tasha Helm, OTR GO 2    09312247307 HC OT SELF CARE/MGMT/TRAIN EA 15 MIN 7/13/2022 Tasha Helm, OTR GO 4    64445860287 HC OT THER PROC EA 15 MIN 7/13/2022 Tasha Helm, OTR GO 1    13626120326 HC OT SELF CARE/MGMT/TRAIN EA 15 MIN 7/13/2022 Tasha Helm, OTR GO 1    84695781537 HC OT THER PROC EA 15 MIN 7/13/2022 Tasha Helm, OTR GO 1                   RAMO Mike  7/13/2022

## 2022-07-13 NOTE — THERAPY TREATMENT NOTE
Inpatient Rehabilitation - Physical Therapy Treatment Note       [facility]     Patient Name: Zaina Martinez  : 1965  MRN: [id number]    Today's Date: 2022                    Admit Date: 2022      Visit Dx:     ICD-10-CM ICD-9-CM   1. Generalized weakness  R53.1 780.79   2. Diabetic muscle infarction ([facility])  E11.69 250.80    M62.20 728.89   3. Other insomnia  G47.09 780.52       Patient Active Problem List   Diagnosis   • Renal insufficiency   • Hypertensive disorder   • Hypothyroidism   • Type 2 diabetes mellitus with kidney complication, with long-term current use of insulin ([facility])   • Rheumatoid arthritis ([facility])   • Angioedema   • Esophageal dysmotility   • Anemia   • Medically noncompliant   • Myocardial infarction due to demand ischemia ([facility])   • Enteritis   • PRES (posterior reversible encephalopathy syndrome)   • Urine retention   • Klebsiella infection   • Superficial thrombophlebitis   • Generalized weakness   • ESRD (end stage renal disease) ([facility])   • CAD (coronary artery disease)   • Abnormal urinalysis   • Chronic diastolic CHF (congestive heart failure) ([facility])   • Pyelonephritis   • Calculus of gallbladder with acute on chronic cholecystitis without obstruction   • Pleural effusion on right   • Anemia due to chronic kidney disease, on chronic dialysis ([facility])   • Abnormal findings on diagnostic imaging of other specified body structures   • Acute upper respiratory infection   • Agitation   • Alkaline phosphatase raised   • Casts present in urine   • Cellulitis of toe   • Hip pain   • Community acquired pneumonia   • Depressive disorder   • Diarrhea of presumed infectious origin   • Difficult or painful urination   • Disease due to severe acute respiratory syndrome coronavirus 2 (SARS-CoV-2)   • Dyspnea   • Encounter for follow-up examination after completed treatment for conditions other than malignant neoplasm   • H/O: hypothyroidism   • Hyperlipidemia   • Hypomagnesemia   • Intractable  vomiting with nausea   • Leukocytosis   • Luetscher's syndrome   • Need for influenza vaccination   • Restless legs   • Noncompliance with treatment   • Shoulder pain   • Urinary tract infectious disease   • Metabolic encephalopathy   • Abnormal findings on diagnostic imaging of abdomen   • Status post cholecystectomy   • Hyponatremia   • Leukocytosis   • Acute metabolic encephalopathy   • Encephalopathy, toxic   • Acute CVA (cerebrovascular accident) (H[facility])   • Intracranial hemorrhage (HCC)   • Stroke (HCC)   • Abnormal urinalysis   • Diabetic muscle infarction (HCC)   • Other insomnia       Past Medical History:   Diagnosis Date   • Acute CVA (cerebrovascular accident) (HCC) 5/1/2022   • Acute on chronic diastolic CHF (congestive heart failure) (HCC)    • CAD (coronary artery disease) 12/20/2021   • Diabetes (HCC)    • Disease of thyroid gland    • GERD (gastroesophageal reflux disease)    • Hyperlipidemia 11/30/2018   • Hypertension    • Rheumatoid arthritis (HCC)        Past Surgical History:   Procedure Laterality Date   • CHOLECYSTECTOMY WITH INTRAOPERATIVE CHOLANGIOGRAM N/A 1/10/2022    Procedure: Laparoscopic cholecystectomy with intraoperative cholangiogram;  Surgeon: Ramana Raygoza MD;  Location: [facility];  Service: General;  Laterality: N/A;   • EYE SURGERY     • HYSTERECTOMY     • INSERTION HEMODIALYSIS CATHETER N/A 12/6/2021    Procedure: HEMODIALYSIS CATHETER INSERTION;  Surgeon: Keli Salazar MD;  Location: [facility] 18/19;  Service: Vascular;  Laterality: N/A;   • INSERTION HEMODIALYSIS CATHETER N/A 5/3/2022    Procedure: TUNNELED CATHETER PLACEMENT;  Surgeon: Keli Salazar MD;  Location: [facility];  Service: Vascular;  Laterality: N/A;   • MUSCLE BIOPSY Left 6/15/2022    Procedure: Left quadriceps muscle biopsy;  Surgeon: Marques Ma MD;  Location: [facility];  Service: Neurosurgery;  Laterality: Left;       PT ASSESSMENT (last 12 hours)     IRF PT  Evaluation and Treatment     Row Name 07/13/22 1000          PT Time and Intention    Document Type daily treatment  -LB     Mode of Treatment physical therapy  -LB     Patient/Family/Caregiver Comments/Observations Pnt seated in w/c. Dgt and grandson present.  -LB     Row Name 07/13/22 1000          General Information    Existing Precautions/Restrictions fall  contact precautions  -LB     Row Name 07/13/22 1000          Pain Assessment    Pretreatment Pain Rating 8/10  -LB     Pain Location - Side/Orientation Left  -LB     Pain Location - other (see comments)  thigh  -LB     Pre/Posttreatment Pain Comment Nsg notified, and brought pain meds  -LB     Row Name 07/13/22 1000          Bed Mobility    Supine-Sit [place] (Bed Mobility) standby assist  -LB     Sit-Supine [place] (Bed Mobility) minimum assist (75% patient effort)  -LB     Comment, (Bed Mobility) assessed on txment mat. Moves quite slowly.  Dgt observed bed mob  -LB     Row Name 07/13/22 1000          Transfers    Bed-Chair [place] (Transfers) minimum assist (75% patient effort);moderate assist (50% patient effort)  -LB     Chair-Bed [place] (Transfers) minimum assist (75% patient effort);moderate assist (50% patient effort)  -LB     Assistive Device (Bed-Chair Transfers) walker, front-wheeled;wheelchair  -LB     Sit-Stand [place] (Transfers) minimum assist (75% patient effort);moderate assist (50% patient effort)  -LB     Stand-Sit [place] (Transfers) minimum assist (75% patient effort);contact guard  -LB     Row Name 07/13/22 1000          Bed-Chair Transfer    Comment, (Bed-Chair Transfer) Dgt observed tsfs  -LB     Row Name 07/13/22 1000          Chair-Bed Transfer    Assistive Device (Chair-Bed Transfers) walker, front-wheeled;wheelchair  -LB     Row Name 07/13/22 1000          Sit-Stand Transfer    Assistive Device (Sit-Stand Transfers) walker, front-wheeled  -LB     Row Name 07/13/22 1000          Stand-Sit Transfer     "Assistive Device (Stand-Sit Transfers) walker, front-wheeled  -LB     Row Name 07/13/22 1000          Car Transfer    Type (Car Transfer) sit-stand;stand-sit  -LB     [place] Level (Car Transfer) other (see comments)  atempted to get in Dgts SUV. Unable to get into it due to tall height. I recommended trying 4\" platform to attempt, but pnt stated that she could not do it at this time.  -LB     Assistive Device (Car Transfer) walker, front-wheeled  -LB     Comment, (Car Transfer) Pnt initially refused car tsf 2ary to pain L thigh. She did eventually agree to try in dgts SUV, but was unable to perform car tsf 2ary to tall height of seat.  -LB     Row Name 07/13/22 1000          Gait/Stairs (Locomotion)    [place] Level (Gait) --  Declined amb 2ary to L thigh pain.  -LB     [place] Level (Stairs) minimum assist (75% patient effort);contact guard;2 person assist;verbal cues  -LB     Handrail Location (Stairs) both sides  -LB     Number of Steps (Stairs) 4  -LB     Ascending Technique (Stairs) step-to-step  -LB     Descending Technique (Stairs) step-to-step  -LB     Comment, (Gait/Stairs) moved very slowly on stairs. Declined amb in AM 2ary to L thigh pain.  Declined amb 2ary to L thigh pain.  -LB     Row Name 07/13/22 1000          Wheelchair Mobility/Management    Method of Wheelchair Locomotion (Mobility) bimanual (upper extremity) propulsion  -LB     Forward Propulsion [place] (Wheelchair) standby assist  -LB     Steering [place] (Wheelchair) verbal cues;standby assist  -LB     Turning [place] (Wheelchair) standby assist;verbal cues  -LB     Distance Propelled in Feet (Wheelchair) 150  -LB     Row Name 07/13/22 1000          Hip (Therapeutic Exercise)    Hip Isometrics (Therapeutic Exercise) bilateral;gluteal sets;supine;10 repetitions;sitting;aDduction  -LB     Row Name 07/13/22 1000          Knee (Therapeutic Exercise)    Knee AROM (Therapeutic Exercise) right;SAQ (short arc " quad);supine;10 repetitions  Reported that she was unable to do SAQ w LLE 2ary thigh pain  -LB     Knee Isometrics (Therapeutic Exercise) bilateral;quad sets;supine;10 repetitions  -LB     Knee Strengthening (Therapeutic Exercise) right;flexion;extension;sitting;resistance band;yellow;10 repetitions  -LB     Row Name 07/13/22 1000          Ankle (Therapeutic Exercise)    Ankle AROM (Therapeutic Exercise) bilateral;dorsiflexion;plantarflexion;supine;10 repetitions;sitting  -LB     Row Name 07/13/22 1000          Positioning and Restraints    Post Treatment Position wheelchair  -LB     In Wheelchair call light within reach;exit alarm on;sitting  -LB           User Key  (r) = Recorded By, (t) = Taken By, (c) = Cosigned By    Initials Name Provider Type    LB Liz Rodriguez PTA Physical Therapist Assistant              Rash Left lower thoracic spine (Active)   Distribution localized 07/13/22 0732   Color pink 07/13/22 0732   Configuration/Shape round 07/13/22 0732   Borders irregular 07/12/22 2119   Characteristics dry 07/13/22 0732       Wound 06/15/22 1312 Left anterior thigh Incision (Active)   Dressing Appearance open to air;no drainage 07/12/22 2119   Closure Liquid skin adhesive 07/13/22 0732   Base clean;dry 07/13/22 0732   Drainage Amount none 07/13/22 0732   Dressing Care open to air 07/12/22 2119     Physical Therapy Education                 Title: PT OT SLP Therapies (In Progress)     Topic: Physical Therapy (In Progress)     Point: Mobility training (In Progress)     Learning Progress Summary           Patient Acceptance, E, NR by LB at 7/12/2022 1243   Family Acceptance, E,D, VU,NR by LB at 7/13/2022 1456    Comment: Stairs, (dgt practiced). 2 Helpers for stairs used.  Attempted car tsf to dgts car. Unable to perform as the SUV was too high for pnt. A platform step was offered for practice, but pnt declined.  Pnt refused amb during fam teaching, 2ary to L thigh pain.   Significant Other Refuses, E, NR  by  at 7/4/2022 1245    Comment:  did not show for teaching      Show all documentation for this point (38)                 Point: Home exercise program (In Progress)     Learning Progress Summary           Patient Acceptance, E,TB, NR by MS at 7/11/2022 1434      Show all documentation for this point (13)                 Point: Body mechanics (Done)     Learning Progress Summary           Patient Acceptance, E,TB,D, VU,NR by  at 6/25/2022 1434   Family Acceptance, E, VU by LB at 7/13/2022 1504      Show all documentation for this point (8)                 Point: Precautions (Done)     Learning Progress Summary           Patient Acceptance, E,TB,D, VU,NR by  at 6/25/2022 1434      Show all documentation for this point (8)                             User Key     Initials Effective Dates Name Provider Type Discipline     06/16/21 -  Fany Lima, PT Physical Therapist PT     03/07/18 -  Liz Rodriguez PTA Physical Therapist Assistant PT     06/16/21 -  Alyx Whiting PT Physical Therapist PT    MS 06/16/21 -  Teagan Santos, PT Physical Therapist PT                PT Recommendation and Plan      Patient was wearing a face mask during this therapy encounter. Therapist used appropriate personal protective equipment including mask and gloves.  Mask used was standard procedure mask. Appropriate PPE was worn during the entire therapy session. Hand hygiene was completed before and after therapy session. Patient is not in enhanced droplet precautions.                        Time Calculation:      PT Charges     Row Name 07/13/22 1455 07/13/22 1040          Time Calculation    Start Time 1230  -LB 0930  -LB     Stop Time 1300  -LB 1035  -LB     Time Calculation (min) 30 min  -LB 65 min  -LB           User Key  (r) = Recorded By, (t) = Taken By, (c) = Cosigned By    Initials Name Provider Type    LB Liz Rodriguez, GARY Physical Therapist Assistant                Therapy Charges for Today      Code Description Service Date Service Provider Modifiers Qty    69617160046 HC PT THER PROC EA 15 MIN 7/12/2022 Liz Rodriguez PTA GP 6    88507801097 HC PT THER PROC EA 15 MIN 7/13/2022 Liz Rodriguez, GARY GP 6            PT G-Codes  AM-PAC 6 Clicks Score (PT): 17      Liz Rodriguez, GARY  7/13/2022

## 2022-07-13 NOTE — PROGRESS NOTES
"DAILY PROGRESS NOTE  [facility]    Patient Identification:  Name: Zaina Martinez  Age: 56 y.o.  Sex: female  :  1965  MRN: [id number]         Primary Care Physician: Karson Oreilly MD    Subjective:  Interval History:She complains of leg pain.  Better today.      Objective:    Scheduled Meds:aspirin, 81 mg, Oral, Daily  b complex-vitamin c-folic acid, 1 tablet, Oral, Daily  carvedilol, 25 mg, Oral, BID With Meals  cloNIDine, 0.2 mg, Oral, Q12H  epoetin brooke/brooke-epbx, 10,000 Units, Intravenous, Once per day on   gabapentin, 100 mg, Oral, Q24H  gabapentin, 300 mg, Oral, BID  hydrALAZINE, 100 mg, Oral, Q8H  insulin glargine, 7 Units, Subcutaneous, QAM  insulin lispro, 0-7 Units, Subcutaneous, TID AC  levothyroxine, 200 mcg, Oral, Q AM  lidocaine, 1 patch, Transdermal, Q24H  losartan, 100 mg, Oral, Q24H  miconazole, 1 application, Topical, Q12H  pantoprazole, 40 mg, Oral, Q AM  rOPINIRole, 0.75 mg, Oral, Nightly  sertraline, 150 mg, Oral, Daily  sodium zirconium cyclosilicate, 5 g, Oral, Daily  tamsulosin, 0.4 mg, Oral, Daily      Continuous Infusions:     Vital signs in last 24 hours:  Temp:  [95.4 °F (35.2 °C)-97.9 °F (36.6 °C)] 95.4 °F (35.2 °C)  Heart Rate:  [50-57] 57  Resp:  [16-18] 18  BP: (143-177)/(70-75) 177/74    Intake/Output:    Intake/Output Summary (Last 24 hours) at 2022  Last data filed at 2022  Gross per 24 hour   Intake 720 ml   Output 2600 ml   Net -1880 ml       Exam:  /74 (BP Location: Right arm, Patient Position: Sitting)   Pulse 57   Temp 95.4 °F (35.2 °C) (Oral)   Resp 18   Ht 157.5 cm (62.01\")   Wt 61.7 kg (136 lb 1.6 oz)   SpO2 96%   BMI 24.89 kg/m²     General Appearance:    Alert, cooperative, no distress   Head:    Normocephalic, without obvious abnormality, atraumatic   Eyes:       Throat:   Lips, tongue, gums normal   Neck:   Supple, symmetrical, trachea midline, no JVD   Lungs:     Clear to auscultation bilaterally, " respirations unlabored   Chest Wall:    No tenderness or deformity    Heart:    Regular rate and rhythm, S1 and S2 normal, no murmur,no  Rub or gallop   Abdomen:     Soft, nontender, bowel sounds active, no masses, no organomegaly    Extremities:   Extremities normal, atraumatic, no cyanosis, improving edema in lower extremities   Pulses:      Skin:   Skin is warm and dry,  no rashes or palpable lesions   Neurologic:   no focal deficits noted      Lab Results (last 72 hours)     Procedure Component Value Units Date/Time    POC Glucose Once [[id number]]  (Normal) Collected: 07/02/22 1719    Specimen: Blood Updated: 07/02/22 1721     Glucose 102 mg/dL      Comment: Meter: [id number] : [id number] Smooth Miranda RN       POC Glucose Once [[id number]]  (Abnormal) Collected: 07/02/22 1631    Specimen: Blood Updated: 07/02/22 1635     Glucose 41 mg/dL      Comment: RN Notified R and V Meter: [id number] : [id number] Smooth Miranda RN       POC Glucose Once [[id number]]  (Normal) Collected: 07/02/22 1151    Specimen: Blood Updated: 07/02/22 1152     Glucose 97 mg/dL      Comment: Meter: [id number] : [id number] Randolph SAAVEDRA       POC Glucose Once [[id number]]  (Normal) Collected: 07/02/22 1024    Specimen: Blood Updated: 07/02/22 1025     Glucose 120 mg/dL      Comment: Meter: [id number] : [id number] Smooth Miranda RN       Basic Metabolic Panel [[id number]]  (Abnormal) Collected: 07/02/22 0828    Specimen: Blood Updated: 07/02/22 0933     Glucose 65 mg/dL      BUN 40 mg/dL      Creatinine 2.50 mg/dL      Sodium 135 mmol/L      Potassium 5.3 mmol/L      Chloride 98 mmol/L      CO2 24.0 mmol/L      Calcium 7.6 mg/dL      BUN/Creatinine Ratio 16.0     Anion Gap 13.0 mmol/L      eGFR 22.1 mL/min/1.73      Comment: National Kidney Foundation and American Society of Nephrology (ASN) Task Force recommended calculation based on the Chronic Kidney Disease Epidemiology Collaboration (CKD-EPI) equation refit without  adjustment for race.       Narrative:      GFR Normal >60  Chronic Kidney Disease <60  Kidney Failure <15      Hepatic Function Panel [[id number]]  (Abnormal) Collected: 07/02/22 0828    Specimen: Blood Updated: 07/02/22 0931     Total Protein 5.5 g/dL      Albumin 2.80 g/dL      ALT (SGPT) 14 U/L      AST (SGOT) 28 U/L      Alkaline Phosphatase 380 U/L      Total Bilirubin 0.4 mg/dL      Bilirubin, Direct <0.2 mg/dL      Bilirubin, Indirect --     Comment: Unable to calculate       Phosphorus [[id number]]  (Abnormal) Collected: 07/02/22 0828    Specimen: Blood Updated: 07/02/22 0931     Phosphorus 4.6 mg/dL     CBC & Differential [[id number]]  (Abnormal) Collected: 07/02/22 0828    Specimen: Blood Updated: 07/02/22 0904    Narrative:      The following orders were created for panel order CBC & Differential.  Procedure                               Abnormality         Status                     ---------                               -----------         ------                     CBC Auto Differential[[id number]]        Abnormal            Final result                 Please view results for these tests on the individual orders.    CBC Auto Differential [[id number]]  (Abnormal) Collected: 07/02/22 0828    Specimen: Blood Updated: 07/02/22 0904     WBC 21.35 10*3/mm3      RBC 3.21 10*6/mm3      Hemoglobin 8.4 g/dL      Hematocrit 26.8 %      MCV 83.5 fL      MCH 26.2 pg      MCHC 31.3 g/dL      RDW 18.9 %      RDW-SD 56.6 fl      MPV 10.0 fL      Platelets 234 10*3/mm3      Neutrophil % 81.5 %      Lymphocyte % 10.6 %      Monocyte % 6.7 %      Eosinophil % 0.1 %      Basophil % 0.1 %      Immature Grans % 1.0 %      Neutrophils, Absolute 17.40 10*3/mm3      Lymphocytes, Absolute 2.27 10*3/mm3      Monocytes, Absolute 1.42 10*3/mm3      Eosinophils, Absolute 0.02 10*3/mm3      Basophils, Absolute 0.02 10*3/mm3      Immature Grans, Absolute 0.22 10*3/mm3      nRBC 0.0 /100 WBC     POC Glucose Once [[id number]]  (Normal)  Collected: 07/02/22 0614    Specimen: Blood Updated: 07/02/22 0615     Glucose 92 mg/dL      Comment: Meter: [id number] : [id number] [employer] NA       POC Glucose Once [[id number]]  (Abnormal) Collected: 07/02/22 0253    Specimen: Blood Updated: 07/02/22 0254     Glucose 140 mg/dL      Comment: Meter: [id number] : [id number] Kushal Choe RN       POC Glucose Once [[id number]]  (Abnormal) Collected: 07/01/22 2036    Specimen: Blood Updated: 07/01/22 2037     Glucose 139 mg/dL      Comment: Meter: [id number] : [id number] [employer] NA       Hepatic Function Panel [[id number]]  (Abnormal) Collected: 07/01/22 1827    Specimen: Blood Updated: 07/01/22 1926     Total Protein 6.0 g/dL      Albumin 3.20 g/dL      ALT (SGPT) 18 U/L      AST (SGOT) 33 U/L      Alkaline Phosphatase 450 U/L      Total Bilirubin 0.5 mg/dL      Bilirubin, Direct 0.2 mg/dL      Bilirubin, Indirect 0.3 mg/dL     Basic Metabolic Panel [[id number]]  (Abnormal) Collected: 07/01/22 1827    Specimen: Blood Updated: 07/01/22 1926     Glucose 75 mg/dL      BUN 34 mg/dL      Creatinine 2.21 mg/dL      Sodium 134 mmol/L      Potassium 5.4 mmol/L      Chloride 97 mmol/L      CO2 24.0 mmol/L      Calcium 7.9 mg/dL      BUN/Creatinine Ratio 15.4     Anion Gap 13.0 mmol/L      eGFR 25.6 mL/min/1.73      Comment: National Kidney Foundation and American Society of Nephrology (ASN) Task Force recommended calculation based on the Chronic Kidney Disease Epidemiology Collaboration (CKD-EPI) equation refit without adjustment for race.       Narrative:      GFR Normal >60  Chronic Kidney Disease <60  Kidney Failure <15      Phosphorus [[id number]]  (Normal) Collected: 07/01/22 1827    Specimen: Blood Updated: 07/01/22 1926     Phosphorus 3.2 mg/dL     CBC & Differential [[id number]]  (Abnormal) Collected: 07/01/22 1827    Specimen: Blood Updated: 07/01/22 1909    Narrative:      The following orders were created for panel order CBC &  Differential.  Procedure                               Abnormality         Status                     ---------                               -----------         ------                     CBC Auto Differential[[id number]]        Abnormal            Final result                 Please view results for these tests on the individual orders.    CBC Auto Differential [[id number]]  (Abnormal) Collected: 07/01/22 1827    Specimen: Blood Updated: 07/01/22 1909     WBC 24.21 10*3/mm3      RBC 3.60 10*6/mm3      Hemoglobin 9.4 g/dL      Hematocrit 30.0 %      MCV 83.3 fL      MCH 26.1 pg      MCHC 31.3 g/dL      RDW 19.0 %      RDW-SD 56.4 fl      MPV 10.3 fL      Platelets 288 10*3/mm3      Neutrophil % 94.6 %      Lymphocyte % 2.7 %      Monocyte % 1.4 %      Eosinophil % 0.0 %      Basophil % 0.1 %      Immature Grans % 1.2 %      Neutrophils, Absolute 22.89 10*3/mm3      Lymphocytes, Absolute 0.65 10*3/mm3      Monocytes, Absolute 0.34 10*3/mm3      Eosinophils, Absolute 0.00 10*3/mm3      Basophils, Absolute 0.03 10*3/mm3      Immature Grans, Absolute 0.30 10*3/mm3      nRBC 0.0 /100 WBC     POC Glucose Once [[id number]]  (Abnormal) Collected: 07/01/22 1606    Specimen: Blood Updated: 07/01/22 1607     Glucose 287 mg/dL      Comment: Meter: [id number] : [id number] Dierken Jessica L RN       POC Glucose Once [[id number]]  (Normal) Collected: 07/01/22 1246    Specimen: Blood Updated: 07/01/22 1247     Glucose 126 mg/dL      Comment: Meter: [id number] : [id number] Dierken Jessica L RN       POC Glucose Once [[id number]]  (Normal) Collected: 07/01/22 0639    Specimen: Blood Updated: 07/01/22 0641     Glucose 95 mg/dL      Comment: Meter: [id number] : [id number] [employer] CNA       POC Glucose Once [[id number]]  (Abnormal) Collected: 07/01/22 0208    Specimen: Blood Updated: 07/01/22 0210     Glucose 186 mg/dL      Comment: Meter: [id number] : [id number] Jennie Gilliam CNPRANAY       POC Glucose Once [[id number]]   (Abnormal) Collected: 06/30/22 2032    Specimen: Blood Updated: 06/30/22 2034     Glucose 251 mg/dL      Comment: Meter: [id number] : [id number] Jennie Gilliam CNA       POC Glucose Once [[id number]]  (Abnormal) Collected: 06/30/22 1615    Specimen: Blood Updated: 06/30/22 1616     Glucose 197 mg/dL      Comment: Meter: [id number] : [id number] Makharadze Firuza NA       POC Glucose Once [[id number]]  (Normal) Collected: 06/30/22 1149    Specimen: Blood Updated: 06/30/22 1201     Glucose 100 mg/dL      Comment: Meter: [id number] : [id number] Makharadze Firuza NA       POC Glucose Once [[id number]]  (Abnormal) Collected: 06/30/22 1115    Specimen: Blood Updated: 06/30/22 1117     Glucose 68 mg/dL      Comment: Meter: [id number] : [id number] Makharadze Firuza NA       POC Glucose Once [[id number]]  (Abnormal) Collected: 06/30/22 1100    Specimen: Blood Updated: 06/30/22 1102     Glucose 55 mg/dL      Comment: Meter: [id number] : [id number] Makharadze Firuza NA       POC Glucose Once [[id number]]  (Abnormal) Collected: 06/30/22 1045    Specimen: Blood Updated: 06/30/22 1045     Glucose 55 mg/dL      Comment: Meter: [id number] : [id number] Edson MCELROY RN       Basic Metabolic Panel [[id number]]  (Abnormal) Collected: 06/30/22 0912    Specimen: Blood Updated: 06/30/22 1041     Glucose 91 mg/dL      BUN 43 mg/dL      Creatinine 2.61 mg/dL      Sodium 134 mmol/L      Potassium 5.3 mmol/L      Chloride 100 mmol/L      CO2 23.0 mmol/L      Calcium 7.6 mg/dL      BUN/Creatinine Ratio 16.5     Anion Gap 11.0 mmol/L      eGFR 21.0 mL/min/1.73      Comment: National Kidney Foundation and American Society of Nephrology (ASN) Task Force recommended calculation based on the Chronic Kidney Disease Epidemiology Collaboration (CKD-EPI) equation refit without adjustment for race.       Narrative:      GFR Normal >60  Chronic Kidney Disease <60  Kidney Failure <15      POC Glucose Once [[id number]]  (Abnormal)  Collected: 06/30/22 1034    Specimen: Blood Updated: 06/30/22 1040     Glucose 43 mg/dL      Comment: Result Not Confirmed Meter: [id number] : [id number] Edson Lopez NAVI REYES       Hepatic Function Panel [[id number]]  (Abnormal) Collected: 06/30/22 0912    Specimen: Blood Updated: 06/30/22 1031     Total Protein 5.9 g/dL      Albumin 2.60 g/dL      ALT (SGPT) 15 U/L      AST (SGOT) 30 U/L      Alkaline Phosphatase 426 U/L      Total Bilirubin 0.4 mg/dL      Bilirubin, Direct 0.3 mg/dL      Bilirubin, Indirect 0.1 mg/dL     Phosphorus [[id number]]  (Normal) Collected: 06/30/22 0912    Specimen: Blood Updated: 06/30/22 1031     Phosphorus 4.0 mg/dL     CBC & Differential [[id number]]  (Abnormal) Collected: 06/30/22 0912    Specimen: Blood Updated: 06/30/22 1010    Narrative:      The following orders were created for panel order CBC & Differential.  Procedure                               Abnormality         Status                     ---------                               -----------         ------                     CBC Auto Differential[[id number]]        Abnormal            Final result                 Please view results for these tests on the individual orders.    CBC Auto Differential [[id number]]  (Abnormal) Collected: 06/30/22 0912    Specimen: Blood Updated: 06/30/22 1010     WBC 19.91 10*3/mm3      RBC 3.25 10*6/mm3      Hemoglobin 8.5 g/dL      Hematocrit 27.2 %      MCV 83.7 fL      MCH 26.2 pg      MCHC 31.3 g/dL      RDW 18.1 %      RDW-SD 53.9 fl      MPV 10.0 fL      Platelets 315 10*3/mm3      Neutrophil % 81.8 %      Lymphocyte % 11.2 %      Monocyte % 5.4 %      Eosinophil % 0.2 %      Basophil % 0.1 %      Immature Grans % 1.3 %      Neutrophils, Absolute 16.30 10*3/mm3      Lymphocytes, Absolute 2.23 10*3/mm3      Monocytes, Absolute 1.07 10*3/mm3      Eosinophils, Absolute 0.03 10*3/mm3      Basophils, Absolute 0.02 10*3/mm3      Immature Grans, Absolute 0.26 10*3/mm3      nRBC 0.1 /100 WBC      POC Glucose Once [[id number]]  (Normal) Collected: 06/30/22 0631    Specimen: Blood Updated: 06/30/22 0633     Glucose 117 mg/dL      Comment: Meter: [id number] : [id number] Smyzer Inge NA       POC Glucose Once [[id number]]  (Abnormal) Collected: 06/30/22 0310    Specimen: Blood Updated: 06/30/22 0311     Glucose 181 mg/dL      Comment: Meter: [id number] : [id number] Smyzer Inge NA       POC Glucose Once [[id number]]  (Abnormal) Collected: 06/29/22 2046    Specimen: Blood Updated: 06/29/22 2048     Glucose 219 mg/dL      Comment: Meter: [id number] : [id number] Smyzer Inge NA       POC Glucose Once [[id number]]  (Normal) Collected: 06/29/22 1901    Specimen: Blood Updated: 06/29/22 1902     Glucose 101 mg/dL      Comment: Meter: [id number] : [id number] Cricket Harley NA       POC Glucose Once [[id number]]  (Abnormal) Collected: 06/29/22 1842    Specimen: Blood Updated: 06/29/22 1844     Glucose 56 mg/dL      Comment: Meter: [id number] : [id number] Anup SAAVEDRA           Data Review:  Results from last 7 days   Lab Units 07/13/22  1053 07/11/22  2325 07/10/22  0503   SODIUM mmol/L 129* 133* 133*   POTASSIUM mmol/L 4.6 4.2 4.4   CHLORIDE mmol/L 94* 99 96*   CO2 mmol/L 23.0 24.0 24.0   BUN mg/dL 35* 22* 34*   CREATININE mg/dL 3.63* 2.31* 3.17*   GLUCOSE mg/dL 304* 202* 156*   CALCIUM mg/dL 7.7* 8.3* 7.8*     Results from last 7 days   Lab Units 07/12/22  1052 07/11/22  2325 07/10/22  0503   WBC 10*3/mm3 6.34 7.02 8.20   HEMOGLOBIN g/dL 8.0* 8.3* 7.9*   HEMATOCRIT % 25.2* 25.9* 24.9*   PLATELETS 10*3/mm3 149 156 170     Results from last 7 days   Lab Units 07/11/22  2325   TSH uIU/mL 96.100*   FREE T4 ng/dL 0.68*         Lab Results   Lab Value Date/Time    TROPONINT 0.092 (C) 12/19/2021 1314    TROPONINT 0.117 (C) 11/30/2021 0153    TROPONINT 0.132 (C) 11/29/2021 2121         Results from last 7 days   Lab Units 07/12/22  1052 07/11/22  2325 07/10/22  0503   ALK PHOS U/L 551*  571* 572*   BILIRUBIN mg/dL 0.5 0.5 0.4   BILIRUBIN DIRECT mg/dL 0.3 0.3 0.2   ALT (SGPT) U/L 18 18 18   AST (SGOT) U/L 31 34* 32     Results from last 7 days   Lab Units 07/11/22  2325   TSH uIU/mL 96.100*   FREE T4 ng/dL 0.68*         Glucose   Date/Time Value Ref Range Status   07/13/2022 1041 285 (H) 70 - 130 mg/dL Final     Comment:     Meter: [id number] : [id number] Cricket Peacock [facility]   07/13/2022 0630 149 (H) 70 - 130 mg/dL Final     Comment:     Meter: [id number] : [id number] Traore [facility]   07/13/2022 0302 122 70 - 130 mg/dL Final     Comment:     Meter: [id number] : [id number] [facility]   07/12/2022 2132 153 (H) 70 - 130 mg/dL Final     Comment:     Meter: [id number] : [id number] [facility]   07/12/2022 1604 159 (H) 70 - 130 mg/dL Final     Comment:     Meter: [id number] : [id number] Anup Martin    07/12/2022 1139 229 (H) 70 - 130 mg/dL Final     Comment:     Meter: [id number] : [id number] Anup Martin    07/12/2022 0629 196 (H) 70 - 130 mg/dL Final     Comment:     Meter: [id number] : [id number] [facility] Ahmadiluis danielkel JOSÉ MIGUELA   07/11/2022 2215 202 (H) 70 - 130 mg/dL Final     Comment:     Meter: [id number] : [id number] Jennie Gilliam [facility]           Past Medical History:   Diagnosis Date   • Acute CVA (cerebrovascular accident) (HCC) 5/1/2022   • Acute on chronic diastolic CHF (congestive heart failure) (HCC)    • CAD (coronary artery disease) 12/20/2021   • Diabetes (HCC)    • Disease of thyroid gland    • GERD (gastroesophageal reflux disease)    • Hyperlipidemia 11/30/2018   • Hypertension    • Rheumatoid arthritis (HCC)        Assessment:  Active Hospital Problems    Diagnosis  POA   • 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       Plan:  Insulin same dose.  Continue same and monitor.  The trend is better.  Pain better.   HD per renal. Repeat of CT scans  and MRI of left leg. Venous duplex negative. Not showing anything new.  Discussed with Dr Florentino.  Some studies still pending on muscle biopsy. Also should get PET scan as outpatient. Encouraged her to participate in therapy.  DC planning.  She is planning to go to [facility]. Rheumatology appointment as outpatient. Increase synthroid to 200mcg and advised her to take first thing in am with water and wait 30 before taking any other medicine or eating or drinking any other items.    Aris Isbell MD  7/13/2022  18:35 EDT

## 2022-07-13 NOTE — PROGRESS NOTES
Patient's daughter here this morning for teaching with PT and OT. Patient was having more pain and did not do as well today. Patient did not want to practice steps or car transfers. Daughter feels patient not ready to be at home yet and patient agrees. They would like to go ahead with plan to go to [facility]. Referral made to [facility]. Currently, they do not have bed available. Liaison to get back with me on when they anticipate a bed available and if would accept patient. Will assist with plans. Will schedule wheelchair van transport to MD appointment on Friday, 7/15 since patient could not do car transfers today.

## 2022-07-14 LAB
GLUCOSE BLDC GLUCOMTR-MCNC: 138 MG/DL (ref 70–130)
GLUCOSE BLDC GLUCOMTR-MCNC: 165 MG/DL (ref 70–130)
GLUCOSE BLDC GLUCOMTR-MCNC: 186 MG/DL (ref 70–130)
GLUCOSE BLDC GLUCOMTR-MCNC: 190 MG/DL (ref 70–130)
GLUCOSE BLDC GLUCOMTR-MCNC: 225 MG/DL (ref 70–130)

## 2022-07-14 PROCEDURE — 82962 GLUCOSE BLOOD TEST: CPT

## 2022-07-14 PROCEDURE — 97535 SELF CARE MNGMENT TRAINING: CPT

## 2022-07-14 PROCEDURE — 97110 THERAPEUTIC EXERCISES: CPT

## 2022-07-14 PROCEDURE — 63710000001 INSULIN LISPRO (HUMAN) PER 5 UNITS: Performed by: HOSPITALIST

## 2022-07-14 RX ADMIN — CARVEDILOL 25 MG: 25 TABLET, FILM COATED ORAL at 16:48

## 2022-07-14 RX ADMIN — HYDRALAZINE HYDROCHLORIDE 100 MG: 50 TABLET, FILM COATED ORAL at 14:11

## 2022-07-14 RX ADMIN — SERTRALINE 150 MG: 100 TABLET, FILM COATED ORAL at 08:04

## 2022-07-14 RX ADMIN — INSULIN LISPRO 2 UNITS: 100 INJECTION, SOLUTION INTRAVENOUS; SUBCUTANEOUS at 16:49

## 2022-07-14 RX ADMIN — Medication 1 TABLET: at 08:05

## 2022-07-14 RX ADMIN — MICONAZOLE NITRATE 1 APPLICATION: 2 CREAM TOPICAL at 20:44

## 2022-07-14 RX ADMIN — LEVOTHYROXINE SODIUM 200 MCG: 0.1 TABLET ORAL at 05:28

## 2022-07-14 RX ADMIN — SODIUM ZIRCONIUM CYCLOSILICATE 5 G: 5 POWDER, FOR SUSPENSION ORAL at 08:04

## 2022-07-14 RX ADMIN — HYDRALAZINE HYDROCHLORIDE 10 MG: 10 TABLET, FILM COATED ORAL at 08:05

## 2022-07-14 RX ADMIN — GABAPENTIN 100 MG: 100 CAPSULE ORAL at 14:12

## 2022-07-14 RX ADMIN — OXYCODONE 5 MG: 5 TABLET ORAL at 12:11

## 2022-07-14 RX ADMIN — LIDOCAINE 1 PATCH: 50 PATCH CUTANEOUS at 08:05

## 2022-07-14 RX ADMIN — OXYCODONE 5 MG: 5 TABLET ORAL at 20:47

## 2022-07-14 RX ADMIN — PANTOPRAZOLE SODIUM 40 MG: 40 TABLET, DELAYED RELEASE ORAL at 05:52

## 2022-07-14 RX ADMIN — ROPINIROLE HYDROCHLORIDE 0.75 MG: 0.5 TABLET, FILM COATED ORAL at 20:43

## 2022-07-14 RX ADMIN — ZOLPIDEM TARTRATE 2.5 MG: 5 TABLET ORAL at 21:43

## 2022-07-14 RX ADMIN — GABAPENTIN 300 MG: 300 CAPSULE ORAL at 20:43

## 2022-07-14 RX ADMIN — HYDRALAZINE HYDROCHLORIDE 100 MG: 50 TABLET, FILM COATED ORAL at 21:43

## 2022-07-14 RX ADMIN — OXYCODONE 5 MG: 5 TABLET ORAL at 03:26

## 2022-07-14 RX ADMIN — CLONIDINE HYDROCHLORIDE 0.2 MG: 0.1 TABLET ORAL at 08:04

## 2022-07-14 RX ADMIN — LOSARTAN POTASSIUM 100 MG: 100 TABLET, FILM COATED ORAL at 08:04

## 2022-07-14 RX ADMIN — OXYCODONE 5 MG: 5 TABLET ORAL at 16:48

## 2022-07-14 RX ADMIN — OXYCODONE 5 MG: 5 TABLET ORAL at 08:11

## 2022-07-14 RX ADMIN — ASPIRIN 81 MG: 81 TABLET, COATED ORAL at 08:04

## 2022-07-14 RX ADMIN — HYDRALAZINE HYDROCHLORIDE 100 MG: 50 TABLET, FILM COATED ORAL at 05:06

## 2022-07-14 RX ADMIN — INSULIN GLARGINE-YFGN 7 UNITS: 100 INJECTION, SOLUTION SUBCUTANEOUS at 08:06

## 2022-07-14 RX ADMIN — MICONAZOLE NITRATE 1 APPLICATION: 2 CREAM TOPICAL at 12:12

## 2022-07-14 RX ADMIN — INSULIN LISPRO 3 UNITS: 100 INJECTION, SOLUTION INTRAVENOUS; SUBCUTANEOUS at 12:11

## 2022-07-14 RX ADMIN — HYDRALAZINE HYDROCHLORIDE 10 MG: 10 TABLET, FILM COATED ORAL at 00:45

## 2022-07-14 RX ADMIN — TAMSULOSIN HYDROCHLORIDE 0.4 MG: 0.4 CAPSULE ORAL at 08:05

## 2022-07-14 RX ADMIN — CLONIDINE HYDROCHLORIDE 0.2 MG: 0.1 TABLET ORAL at 20:43

## 2022-07-14 RX ADMIN — GABAPENTIN 300 MG: 300 CAPSULE ORAL at 08:05

## 2022-07-14 RX ADMIN — CARVEDILOL 25 MG: 25 TABLET, FILM COATED ORAL at 08:05

## 2022-07-14 NOTE — PROGRESS NOTES
Inpatient Rehabilitation Plan of Care Note    Plan of Care  Care Plan Reviewed - No updates at this time.    Safety    Performed Intervention(s)  Fall precautions: bed low and locked, chair alarm and bed alarms in use, nonskid  socks and gait belt in use, call light and personal belongings within reach.  Hourly rounding.      Psychosocial    Performed Intervention(s)  Provide distraction and/or relaxation as needed.  Allow extra time for patient to verbalize needs, concerns, feelings, etc.      Body Systems    Performed Intervention(s)  Dialysis mwf  Monitor weight and I/O.  DM educator consult.  Accuchecks as ordered.      Sphincter Control    Performed Intervention(s)  Monitor I/O.  Bowel meds prn.      Pain    Performed Intervention(s)  Pain meds prn .  Ice to LLE per order      Body Function Structure    Performed Intervention(s)  Ointment per MD order    Signed by: Bessie Navarro RN

## 2022-07-14 NOTE — PROGRESS NOTES
[facility] Progress Note      Patient Name: Zaina Martinez  : 1965  MRN: [id number]  Primary Care Physician:  Karson Oreilly MD  Date of admission: 2022    Subjective     Interval History:   Follow up ESRD. Slept well. HD with 2.5 liters off yesterday.   Remains hypertensive. No weight yet today. Eating. Bowels moving.   Review of Systems:   As noted above    Objective     Vitals:   Temp:  [95.4 °F (35.2 °C)-98.6 °F (37 °C)] 98.2 °F (36.8 °C)  Heart Rate:  [52-57] 52  Resp:  [17-18] 17  BP: (177-193)/(70-83) 177/76    Intake/Output Summary (Last 24 hours) at 2022 0830  Last data filed at 2022 0825  Gross per 24 hour   Intake 720 ml   Output 2731 ml   Net -2011 ml       Physical Exam:   General: sitting up in bed  Conversant.    HEENT: oral mucosa moist.   Neck:RIJ TDC  Lungs:clear to auscultation. Unlabored.    Heart:RRR no s3 or rub.  early syst m  Abdomen: + bs,soft, nontender   Extremities:1+ -2+ lower ext edema.    Neuro: speech more fluent . Moves all 4 ext.     Scheduled Meds:     aspirin, 81 mg, Oral, Daily  b complex-vitamin c-folic acid, 1 tablet, Oral, Daily  carvedilol, 25 mg, Oral, BID With Meals  cloNIDine, 0.2 mg, Oral, Q12H  epoetin brooke/brooke-epbx, 10,000 Units, Intravenous, Once per day on   gabapentin, 100 mg, Oral, Q24H  gabapentin, 300 mg, Oral, BID  hydrALAZINE, 100 mg, Oral, Q8H  insulin glargine, 7 Units, Subcutaneous, QAM  insulin lispro, 0-7 Units, Subcutaneous, TID AC  levothyroxine, 200 mcg, Oral, Q AM  lidocaine, 1 patch, Transdermal, Q24H  losartan, 100 mg, Oral, Q24H  miconazole, 1 application, Topical, Q12H  pantoprazole, 40 mg, Oral, Q AM  rOPINIRole, 0.75 mg, Oral, Nightly  sertraline, 150 mg, Oral, Daily  sodium zirconium cyclosilicate, 5 g, Oral, Daily  tamsulosin, 0.4 mg, Oral, Daily      IV Meds:        Results Reviewed:   I have personally reviewed the results from the time of this admission to 2022 08:30  EDT     Results from last 7 days   Lab Units 07/13/22  1053 07/12/22  1052 07/11/22 2325 07/10/22  0503   SODIUM mmol/L 129*  --  133* 133*   POTASSIUM mmol/L 4.6  --  4.2 4.4   CHLORIDE mmol/L 94*  --  99 96*   CO2 mmol/L 23.0  --  24.0 24.0   BUN mg/dL 35*  --  22* 34*   CREATININE mg/dL 3.63*  --  2.31* 3.17*   CALCIUM mg/dL 7.7*  --  8.3* 7.8*   BILIRUBIN mg/dL  --  0.5 0.5 0.4   ALK PHOS U/L  --  551* 571* 572*   ALT (SGPT) U/L  --  18 18 18   AST (SGOT) U/L  --  31 34* 32   GLUCOSE mg/dL 304*  --  202* 156*       Estimated Creatinine Clearance: 14.9 mL/min (A) (by C-G formula based on SCr of 3.63 mg/dL (H)).    Results from last 7 days   Lab Units 07/13/22  1053 07/11/22  2325 07/10/22  0503   PHOSPHORUS mg/dL 4.3 3.0 3.9             Results from last 7 days   Lab Units 07/12/22  1052 07/11/22  2325 07/10/22  0503 07/09/22  0711 07/08/22  0905   WBC 10*3/mm3 6.34 7.02 8.20 7.81 8.42   HEMOGLOBIN g/dL 8.0* 8.3* 7.9* 8.1* 8.1*   PLATELETS 10*3/mm3 149 156 170 173 175             Assessment / Plan     ASSESSMENT:  1.    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 HD MWF. Neurontin likely adding to her edema.   2.  Intracerebral bleed and altered mental status.  Rehab .   3.  Infected TDC, s/p removal 5/1. Replaced. Staph aureus . completed vancomycin.  4.  DM2    5.  Coronary artery disease  6.  Acute on chronic diastolic dysfunction . Volume off with HD.   7.  Anemia of CKD, on long-acting ANDRAE as an outpatient. Hg stable yesterday .  On procrit here.  Will stop it until BP better controlled.   8. Hypothyroidism.  TSH still very high and Free t4 low .  LHA increased replacement .  This is certainly contributing to her edema.   9. 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 On ASA.   PLAN:  1.  HD tomorrow.  Remove volume.   2. MWF labs would be fine with renal .  3. DC procrit until BP better.   Ruth Lira MD  07/14/22  08:30 EDT    [facility]  [phone]

## 2022-07-14 NOTE — PROGRESS NOTES
CC: Debility    SUBJECTIVE:      Patient with increased discomfort in left thigh earlier today and did seated activities. Later in morning ambulated short distance. Pain more at midline quad biopsy site today. Also some discomfort at left knee.  Tolerated HD yesterday  Sleeping better and tolerates pain meds. No myoclonus on gabapentin  Appointment with Rheumatologist tomorrow - to take CD of x-ray/CT/MRI and copy of path report with her. No further update on path report from [facility] as of this morning.                OBJECTIVE:  Vitals:    07/14/22 0704   BP: 177/76   Pulse: 52   Resp:    Temp:    SpO2:        GENERAL: NAD  MENTAL STATUS: Awake alert  HEENT:  NCAT  CARDIOVASCULAR: Sinus rhythm    CHEST:  hemodialysis access right chest  RESPIRATORY: Normal respirations.  Clear to auscultation without wheezes rales or rhonchi  ABDOMEN: Active bowel sounds, soft, nontender.     EXTREMITIES: Left thigh edema .  Edema at the left thigh appears similar..  Also has some edema in the left lower leg as well as right lower leg   Has diffuse tenderness to palpation about the left thigh.  No tenderness of the left calf  Healed mid thigh biopsy incision.   No myoclonus.  NEUROLOGIC:    Motor testing-takes resistance in the bilateral upper extremities and right lower extremity with left ankle dorsiflexion.  At least antigravity left knee extension but not fully tested secondary to pain inhibition      Scheduled Meds:aspirin, 81 mg, Oral, Daily  b complex-vitamin c-folic acid, 1 tablet, Oral, Daily  carvedilol, 25 mg, Oral, BID With Meals  cloNIDine, 0.2 mg, Oral, Q12H  gabapentin, 100 mg, Oral, Q24H  gabapentin, 300 mg, Oral, BID  hydrALAZINE, 100 mg, Oral, Q8H  insulin glargine, 7 Units, Subcutaneous, QAM  insulin lispro, 0-7 Units, Subcutaneous, TID AC  levothyroxine, 200 mcg, Oral, Q AM  lidocaine, 1 patch, Transdermal, Q24H  losartan, 100 mg, Oral, Q24H  miconazole, 1 application, Topical, Q12H  pantoprazole, 40 mg, Oral, Q  AM  rOPINIRole, 0.75 mg, Oral, Nightly  sertraline, 150 mg, Oral, Daily  sodium zirconium cyclosilicate, 5 g, Oral, Daily  tamsulosin, 0.4 mg, Oral, Daily      Continuous Infusions:   PRN Meds:.•  acetaminophen  •  dextrose  •  dextrose  •  diphenoxylate-atropine  •  glucagon (human recombinant)  •  heparin (porcine)  •  hydrALAZINE  •  nitroglycerin  •  oxyCODONE  •  oxyCODONE  •  sodium chloride  •  zolpidem    Glucose   Date/Time Value Ref Range Status   07/14/2022 0625 138 (H) 70 - 130 mg/dL Final     Comment:     Meter: [id number] : [id number] León SAAVEDRA   07/14/2022 0158 165 (H) 70 - 130 mg/dL Final     Comment:     Meter: [id number] : [id number] Frank Dunn RN   07/13/2022 2058 171 (H) 70 - 130 mg/dL Final     Comment:     Meter: [id number] : [id number] León SAAVEDRA   07/13/2022 1843 113 70 - 130 mg/dL Final     Comment:     Meter: [id number] : [id number] Jose Angel Viola NA   07/13/2022 1041 285 (H) 70 - 130 mg/dL Final     Comment:     Meter: [id number] : [id number] Cricket Harley    07/13/2022 0630 149 (H) 70 - 130 mg/dL Final     Comment:     Meter: [id number] : [id number] León SAAVEDRA   07/13/2022 0302 122 70 - 130 mg/dL Final     Comment:     Meter: [id number] : [id number] León SAAVEDRA   07/12/2022 2132 153 (H) 70 - 130 mg/dL Final     Comment:     Meter: [id number] : [id number] León SAAVEDRA     Results from last 7 days   Lab Units 07/12/22  1052 07/11/22  2325 07/10/22  0503   WBC 10*3/mm3 6.34 7.02 8.20   HEMOGLOBIN g/dL 8.0* 8.3* 7.9*   HEMATOCRIT % 25.2* 25.9* 24.9*   PLATELETS 10*3/mm3 149 156 170     Results from last 7 days   Lab Units 07/13/22  1053 07/12/22  1052 07/11/22  2325 07/10/22  0503   SODIUM mmol/L 129*  --  133* 133*   POTASSIUM mmol/L 4.6  --  4.2 4.4   CHLORIDE mmol/L 94*  --  99 96*   CO2 mmol/L 23.0  --  24.0 24.0   BUN mg/dL 35*  --  22* 34*   CREATININE mg/dL 3.63*  --  2.31* 3.17*   CALCIUM mg/dL 7.7*  --  8.3* 7.8*    BILIRUBIN mg/dL  --  0.5 0.5 0.4   ALK PHOS U/L  --  551* 571* 572*   ALT (SGPT) U/L  --  18 18 18   AST (SGOT) U/L  --  31 34* 32   GLUCOSE mg/dL 304*  --  202* 156*      Latest Reference Range & Units 05/27/22 11:30   Creatine Kinase 20 - 180 U/L 94   Aldolase 3.3 - 10.3 U/L 5.6       Imaging Results (Last 24 Hours)     ** No results found for the last 24 hours. **          ASSESSMENT AND PLAN    # R MCA s/p TPA with subsequent ICH with debility  Initially held antiplatelet/anticoagulation.  Reviewed with neurology on May 20 and resume aspirin 81 mg daily  SBP goal <160  Recommend outpatient Neurology fu - repeat MRI in 3 months        # DM  June 27-hyperglycemia on steroids.  Lantus increased to 30 units twice daily, Humalog 5 units 3 times daily with meals, and all increase sliding scale coverage  June 28 -hypoglycemic after increasing insulin dosing.  Blood glucose up to 98 prior to lunch, will hold sliding scale insulin and give 5 units scheduled with meal  June 30-hypoglycemia-Lantus twice daily decreased from 35 to 20 units.  Scheduled Humalog with meals discontinued  July 1-prednisone has been discontinued.  Reviewed with internal medicine and Lantus decreased to 10 units twice daily and on discharge home to resume her home regimen of Lantus 10 units daily.  She will check her sugar tonight at home and if elevated give Lantus 10 units tonight and then continue with the Lantus 10 units daily tomorrow.  Reviewed with patient and her  that her sugars may be elevated initially as she just completed prednisone.  They were instructed to call for any additional instructions on adjustment in regimen if it remains elevated at home this weekend  July 6-Lantus 7 units daily  July 7-blood sugar range 794-579- yesterday,  this AM     # ESRD   Nephrology following  Inpatient HD    Flomax  Hyperkalemia  July 5-patient had hemodialysis on July 3 and July 4 for hyperkalemia.  Lokelma resumed by  nephrology  July 7-had hemodialysis yesterday and has plans for hemodialysis again today  July 8-hemodialysis again today     Infectious disease-   # s/p catheter infection with MRSA  Vancomycin IV with stop date of May 26  May 24-vancomycin random equal 12.90-on vancomycin 500 mg IV on Pmrnyxj-Jaokfeas-Sojpqpzy  May 26-to complete course of vancomycin today  #Urinary tract infection-on Dana 3 urinalysis was negative for infection but noted to have leukocytosis increase and repeat urinalysis on June 6 shows findings consistent with urinary tract infection.  Placed on a course of cefdinir renal dose 3 mg daily for 7 days.  No costophrenic angle tenderness patient reviewed with infectious disease service.  Leukocytosis felt related to the bladder infection and not previous catheter infection.  June 8-urine culture results pending.  On cefdinir.  Culture with E. coli, sensitivity pending  June 9 - E coli sensitive to cephalosporins.  June 21 - continues with leukocytosis WBC  15K today. May be from inflammatory process. Steroids added yesterday.   June 22-urine described as milky characteristic, different from previous-recheck urinalysis with culture if indicated  June 23-Labs are still pending today.  Urinalysis also pending as she does not make much urine.  She had a temperature of 100.2 last evening, afebrile this morning.  June 27 - abnormal UA but urine culture from June 24 no growth  July 1-leukocytosis felt related to the noninfectious process in the left thigh as well as secondary to steroids  July 5-WBC trending down to 15.6 today  July 6-WBC trending down to 12 K  July 7-WBC 8.4     #left hydroureteronephrosis-Dana 3-CT scan shows left hydro ureter nephrosis.  She had some previous dilation of the ureter on comparison the past studies but increased today.  Bladder noted to be markedly distended.  Will do in and out cath now and daily to decompress bladder.  Addendum-intermittent cath for 600 cc.  June 4-once  daily intermittent cath 450 cc.  June 5-seen by urology-Nguyễn catheter placed with plans to recheck hydroureter on renal ultrasound in 3 to 5 days.  June 6-Nguyễn catheter placed yesterday by urology, with only 170 cc out so far.  Patient reports did not note any change in her left groin pain after the in and out cath with decompression of the bladder..  June 8-reviewed with urology service-request noncontrast CT scan stone protocol to evaluate for resolution of the left hydronephrosis with urology planning to see tomorrow to review the films and then make recommendations, urology would recommend leaving the Nguyễn catheter in for the time being.  June 9 - improved left hydroureter on CT , Nguyễn discontinued.   July 1-to follow-up with urology as an outpatient with follow-up CT planned in August.  She occasionally required intermittent straight cath but this was very infrequent.  Home health nursing to follow.  Continues on Flomax  July 7-intermittent straight cath 400 cc  July 11-She continues with hydroureteronephrosis on the left side on follow-up CT scan-intermittent straight cath volumes have not been over about 400 cc over the past month.  Urology re-consulted for updated assessment  July 12-plan is intermittent straight cath routinely once or twice a day  July 14 - ISC for 150 cc         #Anemia-  EPO.  June 6-hemoglobin 7.8  June 21- hemoglobin 8.3  June 27 - hemoglobin 8.7  June 28 - hemoglobin 8.8  July 6-hemoglobin 8.5     Lymphadenopathy-evaluated by hematology oncology while here.  No significant change from scans earlier this year.  She is to repeat a scan in 3 months and follow-up with hematology oncology  July 1121-gsnvrg-mi CT of the abdomen pelvis shows lymphadenopathy overall about the same per her report.  To have follow-up with hematology oncology and repeat scan in another 2 months or so     #Elevated alkaline phosphatase  June 6-elevated alkaline phosphatase 770, up from 289 one week ago, 140 one  month ago. Similar elevation in March, Feb even higher at 1,330 ( intra-abdominal fluid collection, underwent CT-guided aspiration  Revealed blood and cultures did not reveal any growth and therefore antibiotics  discontinued.  Surgery also did evaluate and signed off.), and elevated during admission in January ( She was seen by general surgery who recommended and performed laparoscopic cholecystectomy during that admission).   ALT 70, AST 87, Total bilirubin 0.8. Ca 8.3.  ? If liver source or bone or due to an inflammatory response.  June 7-GGT also elevated.  Seen by gastroenterology.  Mamou biliary source.  Liver ultrasound ordered  June 8-liver ultrasound was unremarkable  June 21 - patient declined liver biopsy.   June 27-gastroenterology following peripherally-plans to follow-up as an outpatient and review option of liver biopsy again if alkaline phosphatase remains elevated  June 28-remains elevated at 503  June 30-alkaline phosphatase lower at 426  July 6-trend back up to 634  July 8-alkaline phosphatase unchanged 627     #Pain - neuropathy BL lower extremity/left thigh pain  Gabapentin/oxycodone.    June 7-patient with lethargy this morning.  May be due to urinary tract infection but with recent increase and gabapentin and oxycodone, will change gabapentin to 200 mg twice a day and decrease oxycodone from 5 back down to 2.5 mg p.o. every 4 hours as needed  June 8-better speed with her processing today  June 21 - pain med regimen recently adjusted with tramadol 25 mg q 4 hours prn, gabapentin 300 g bid, lidoderm patch, oxycodone 2.5 mg q 6 hours prn. Prednisone 60 mg daily added which may help with pain from inflammatory process. Reports pain better today and participated better in therapies.   June 27 - continue present regimen  July 1-home on oxycodone 5 mg p.o. every 4 hours.  Pain dispense #40, gabapentin 300 mg twice daily, Tylenol 500 mg every 6 hours as needed  July 5-increase gabapentin slightly  300-100-300 mg.  Watch for any myoclonic twitching  July 8-had some lethargy earlier today, patient feels that she is tolerating oxycodone and gabapentin.  Was alert when seen on rounds.        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  Seen by infectious disease service with no acute infectious process noted.  Evaluated by hematology regarding lymphadenopathy, lymph node felt too small to biopsy.  Patient was on a course of low-dose prednisone 10 mg for 3 days then 5 mg for 2 days and then another 10 mg dose with out any significant improvement in the swelling or pain.  She has a history of rheumatoid arthritis.  See CT of the left thigh and MRI of the left thigh and pelvis reports   With lumbar radiculoplexitis and diabetic amyotrophy, on review of the literature do not see edema that she presents with associated with the findings or MRI changes in the tendon and musculature with edema.  There is descriptions of MRI changes in the plexus and peripheral nerve.  In terms of treatment options for diabetic amyotrophy , there have not been definitive clinical trials.  Immunomodulation with steroids has been inconclusive as well as other immunomodulation therapy.  Reviewed with the patient  that  feel that she would benefit from seeing her rheumatologist as an outpatient to evaluate this further, as there does appear to be inflammatory cause given the lymphadenopathy and edema.  Rheumatologist does not come to the hospital.  CPK x2 has been normal.  Aldolase has been normal.  May need to look at biopsy of  left thigh muscle to assess further.   June 8-patient reviewed with neurology yesterday who will assess and also provide input regarding whether muscle biopsy may be helpful.  June 9 - Discussed with Neurology and Rheumatology - plan is for EMG and then muscle biopsy.   Dana 15 - Left quadricep muscle biopsy was completed 6/15, results pending.  Labs: CKs have been normal, ANCA panel 6/11 was unremarkable  June 21 - started on full treatment dose of Prednisone 60 mg daily yesterday pending path results. Reports pain better so far today. Specimen sent to [facility]. Clinical impression presently focal inflammatory myositis pending final pathology results.   June 22-pain continues better today.  June 23-outside pathology report still pending  June 24 - Patient reports left thigh pain better over past couple days but still present. Does not have the soreness at medial thigh as before. Complains of pain at mid-thigh. No change in swelling. Does have discomfort with proximal movement in LLE. Walked 320 feet CTG SBA RW today.   Pain improved on steroid. Discontinued atorvastatin. Biopsy results returned from [facility] - see report -Type 2 fiber atrophy, advanced. Denervation/reinnervation. No evidence of inflammatory myopathy or vasculitis. [facility] lab pursuing a dystrophy panel and will report findings in an addendum.  Reviewed with Neurology - as shown symptomatic response, continue Prednisone 60 mg daily for about one more week, then taper off over month.   June 27 - continue present regimen  June 28-increased pain medication regimen to oxycodone 5 mg every 4 hours as needed for severe pain.   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 Treatment would be aspirin which she is already on. Discussed with Neurology and as been on Prednisone 60 mg for only 1.5 week, will stop and not do taper. Discussed with Internal Medicine who  will adjust insulin.  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  Reviewed with the patient that treatment for this is aspirin which she is already on.  She may do walking activities but would avoid strenuous activities with left quadricep strengthening.  She may strengthen the other 3 extremities as tolerated.  Reviewed may take 3 months to resolve.  July 7-continues with left thigh pain.  She is noted to have increased edema in both lower extremities but particularly the left thigh compared to recent.  Lokelma has been associated with fluid retention which may explain increase edema in part.  Continues on aspirin for suspected diabetic muscular infarct.  No update report yet on additional studies for muscle biopsy.  Initial report was June 24 with additional studies planned  July 11-updated MRI imaging of the left thigh this past weekend.  See report.  She has appointment with rheumatology for further assessment later this week. -will recheck CK level for updated baseline as about 4 weeks out now from her biopsy which could have affected level.  Previous CK levels were not elevated.  White blood cell count has normalized which would hold against any active infection.  Edema at the left thigh appears about the same.  Continues with pain on the left side with pain inhibition with proximal muscles.  July 12-CPK was 39 yesterday.  Patient reviewed with [facility]ologist at [facility] today-no additional results yet-they are to call when have additional results    # HTN   Coreg  Clonidine  Hydralazine  Losartan  Nephrology/internal medicine following     #Hypothyroidism  Levothyroxine  June 9 - recheck TSH- addendum Dana 10- On Nov 30, 2021 , on Dec 2, 2021 T4 = 0.90, on Dec 9, T4=1.68. On May 7, TSH = 134. Has been on Levothyroxine 125 mcg/day it appears since at least Dec 13, 2021. See Dr Templeton's  discharge note from Dec 17,2021 which discusses thyroid labs/dosing at that time. On June 9, 2022 TSH = 54.4.   Check free T4. Will get evaluation from Internal Medicine regarding thyroid studies/levothyroxine dosing.  June 11 - levothyroxine increased to 150mcg daily-continue this dose and she will need follow up TSH in 4-6 weeks from June 11 as an outpatient  July 11-repeat TSH and T4 ordered for Monday, July 11 July 12-TSH still elevated, higher at 96.  Levothyroxine increased to 200 mcg daily and separate from other meds.  Free T4 equal 0.68     #CAD  ASA held in setting of ICH - restart aspirin 81 mg daily on May 20, 2022 per Neurology     #Depression   Sertraline  June 28-sertraline dose increased  July 5-reviewed with psychiatry service-continue present regimen    Insomnia-improved on Ambien 5 mg  July 6-fatigue during the day-decrease Ambien 2.5 mg nightly and assess response  July 7-tolerated Ambien 2.5    #GERD   PPI     #Restless leg syndrome  Requip     #DVT prophylaxis-SCDs ordered but patient refusing.  Per neurology note May 11-continue off anticoagulation/antiplatelet for now. Restarted ASA 81 mg on May 20.  May 16-reviewed with patient and try venous foot compression devices  May 20-also not able to tolerate venous foot compression devices.  May 25-bilateral lower extremity venous duplex negative for DVT  July 9-  venous duplex negative for DVT left lower extremity    Diarrhea-July 7-loose stool x5 over the last day.  Lokelma is not associated with diarrhea.  C. difficile was negative on July 5.  Was on cefdinir from June 7 to June 13 and cefazolin one-time dose on Dana 15.  - will recheck C. Difficile  July 11-C. difficile was negative x2.  Diarrhea resolved        -  comprehensive inpatient rehabilitation program working with PT, OT, SLP for a minimum of three hours per day, five days per week. - will monitor for progress     TEAM CONF - MAY 17- BED SBA. TRANSFER MIN. 4 STAIRS MIN. GAIT 160 FEET  CTG RW. SLOW TO PROCESS. BATH MIN. LBD MIN. UBD MIN. GROOMING SET UP. TOILETING MIN . COGNITION OVERALL MILD DEFICITS. VISUAL IMPAIRMENT IMPACTS SOME TESTING. ESRD-HD. ANTIBIOTICS - VANCOMYCIN 10.90 YESTERDAY.  ELOS - 1-2 WEEKS.      TEAM CONF - MAY 24 - ALWAYS SO PROCESSING AFTER EACH DIALYSIS SESSION. AT HOME WOULD GO BACK TO HOME AND SLEEP. BED MIN ASSIST. TRANSFERS MIN. GAIT 80   FEET RW CTG. 4 STAIRS CTG. TOILET TRASNFERS AND SHOWER TRANSFERS MIN CTG. BATH CTG. LBD CTG. UBD SET UP. GROOMING SET UP. TOILETING CTG.   LEFT GROIN - ADDUCTOR PAIN - There is mild joint space narrowing involving both hips symmetrically. There are also some minimal degenerative changes involving both hips. The overall appearance is similar to the study of 12/16/2021. MODERATE WORD RETRIEVAL DEFICITS. IMPAIRED VISION AFFECTS HOME MANAGEMENT TASKS. HYPOGLLYCEMIA AFTER SSI .DECREASED TO 0-7 UNITS.   ELOS - ONE WEEK.         TEAM CONF - MAY 31 - TRANSFERS CTG. GAIT 40- FEET CTG RW LIMITED BY THIGH PAIN. TOILET TRANSFERS CTG. BATH CTG. LBD CTG. UBD SET UP. TOILETING CTG.  GROOMING SET UP. COGNITION - MODERATE WORD RETRIEVAL DEFICITS, MODERATE IMPAIRED MEMORY IMPACTED BY FATIGUE, STILL SLOW PROCESSING.  BNE (Active)  Att'n. - Mildly Imp.  Exec. Fx. - Mildly Imp., slowed processing speed  Rsng/Jgmnt - WNL  Arith - Mod Imp.  Visuospatial Skills - DNA, pt declined citing poor vision  Visual Mem. DNA  Verbal Mem. - WNL  Emot - Pt endorsed mild dysphoria and anxiousness secodnary to current inpatient  Stay.  CONTINENT BOWEL. OCCASIONAL VOID. ESRD-HD. ON INSULIN. SKIN INTACT. LIDODERM PATCH TO LEFT ADDUCTOR TENDON. COURSE OF STEROID FOR LEFT THIGH JEREMI. CONSULTED ORTHO FOR INPUT.  ELOS - POSSIBLY Thursday DEPENDING ON FURTHER EVALUATIONS.            TEAM CONF - JUNE 21 -  DECLINED THERAPY DUE TO PAIN.  AT MOST RECENT THERAPY WHEN PARTICIPATED, TRANSFERS MIN. GAIT 80 FEET MIN / CTG MIN ASSIST RW. TOILET TRANSFERS CTG. TOILETING CTG.  MODERATE IMPAIRED VERBAL MEMORY. PAIN MANAGEMENT - MEDS ADJUSTED - OXYCODONE 2.5 MG Q 6 HOURS PRN, TRAMADOL 25 MG Q 4 HOURS. MUSCLE BIOPSY RESULTS PENDING. STARTING ON PREDNISONE 60 MG DAILY YESTERDAY. WILL NEED TO ADJUST INSULING, BLOOD GLUCOSE > 300 THIS AM. GASTROENTEROLOGY EVALUATING LIVER. PATIENT DECLINES LIVER BIOPSY.  HYPOTHYROID - levothyroxine increased to 150mcg daily-continue this dose and she will need follow up TSH in 4-6 weeks from June 11 as an outpatient  ELOS -   1.5 WEEKS     TEAM CONF - June 28 - BED MIN  SIT TO SUPINE, SBA SUPINE TO SIT. CAR TRANSFERS CTG. TRANSFERS MIN ASSIST. 4 STAIRS MIN. GAIT 240 FEET CTG SBA. TOILET TRANSFERS MIN. UBB SBA. LBB MIN WITH LLE. UBD SET UP. LBD MOD ASSIST LLE.   INSULIN ADJUSTED FOR ELEVATED BLOOD GLUCOSE ON STEROIDS. ON PREDNISONE 60 MG DAILY AND PLAN TO TAPER OVER NEXT MONTH . BIOPSY REPORT SENT TO OUTPATIENT RHEUMATOLOGIST YESTERDAY. ADDITIONAL STUDIES ON BIOPSY PENDING.   BLADDER SCAN 400 CC BUT ONLY 200 CC ON INTERMITTENT STRAIGHT CATH. LEFT QUAD MUSCLE BIOPSY SITE HEALING.  ESRD - HD.   ELOS - Thursday WITH HOME HEALTH PT, OT, AND NURSING FOR DIABETES AND MONITORING ON STEROIDS.   Addendum-was not able to do car transfer after hemodialysis on Friday, July 1 and continued with inpatient rehab course.    TEAM CONF - July 12 - BED MOD. TRASNFER MIN, AT TIMES MIN-MOD. GAIT CTG RW 60 FEET. LAST WORKED ON STAIRS July 2 4 STAIRS MIN ASSIST. TOILET TRANSFERS MIN. UBB SBA. LBB MIN MOD. UBD SET UP. LBD MOD MAX. OXYCODONE AND GABAPENTIN FOR PAIN. LEFT HYDROURETERNEPHROSIS. - CATHETERIZE ONCE A DAY. ON FLOMAX. HAS A RASH ON BACK/BUTTOCK - MICONAZOLE CREAM. APPOINTMENT WITH DR Cortez - RHEUMATOLOGY - ON Friday July 15. CK = 39 YESTERDAY. THYROID RECHECK - TSH 96.1 AND FREE T4 0.68 YESTERDAY - INTERNAL MEDICINE FOLLOWING.   [facility] - LOOKING AT SUBACUTE OPTIONS.     Rehabilitation-July 7-Patient has not been able to participate in therapy for 3 hours a day.  Will ask  internal medicine to assess for transfer to the acute care wing in the hospital  July 8-Was evaluated by internal medicine service who did not feel that she needed transfer to the acute care wing of the hospit    Adonis Florentino MD       During rounds, used appropriate personal protective equipment including mask and gloves.  Additional gown if indicated.  Mask used was standard procedure mask. Appropriate PPE was worn during the entire visit.  Hand hygiene was completed before and after.

## 2022-07-14 NOTE — PROGRESS NOTES
Received call from [facility] liaison that they will not have bed available until mid to late next week so have declined patient at this time. Contacted liaison with [facility]. [facility], which has dialysis in house, does not have any openings for dialysis patients at this time. Discussed possibility of patient going to [facility] but they cannot provide transport at the time patient is scheduled at her outpatient center (4:00 p.m.) If can go earlier in day, they may be able to transport until patient gets approved for TAR 3. Also contacted [facility] liaison to ask about bed availability and if they would take dialysis patient. They usually require family to transport to dialysis but will check with [email or web address], which would be close to patient's home, to see if they could transport. Discussed above with patient's  and daughter, by phone. Discussed with patient earlier this morning that [facility] unable to take her at this time. Patient discouraged as she doesn't want to go to NH. Daughter will check her grandparent's car, which is [email or web address], to see if patient would be able to transfer easier into this SUV. If so, they could provide transportation to her outpatient center, which would give them more options of facilities. Daughter to let me know tomorrow. Daughter stated she would prefer [email or web address] since closer to their home. Will continue to work on placement.     Scheduled patient to be picked up tomorrow, 7/15, at 3:30 p.m. by [facility] to transport to appointment with Dr. Cortez at 4:00 p.m. Discussed with  and daughter. Daughter will meet patient at doctor's office to go to appointment with her. Discussed with patient's nurse so can call to get dialysis scheduled tomorrow morning so can be finished in time to go to appointment.

## 2022-07-14 NOTE — PLAN OF CARE
Goal Outcome Evaluation:  Plan of Care Reviewed With: patient             Problem: Rehabilitation (IRF) Plan of Care  Goal: Plan of Care Review  Outcome: Ongoing, Progressing  Flowsheets  Taken 7/14/2022 0227 by Mona Marie RN  Outcome Evaluation: Pt is alert and oriented x 4. Very pleasant and cooperative. Took meds whole PO with thins. HD MWF, dialysis cath dressing changed 7/13 per dialysis note. Oliguric, bladder scanned q shift and cathed >300cc. Cream applied to rash on lower back, denies discomfort. Fluid restriction on 1500cc. Blood pressure elevated this shift. PRN Hydralazine administered. Will continue to monitor. PRN Ambien and Vanessa 5mg administered at HS. Has rested well this shift with eyes closed. no unsafe behaviors. Call light within reach.

## 2022-07-14 NOTE — THERAPY TREATMENT NOTE
Inpatient Rehabilitation - Physical Therapy Treatment Note       [facility]     Patient Name: Zaina Martinez  : 1965  MRN: [id number]    Today's Date: 2022                    Admit Date: 2022      Visit Dx:     ICD-10-CM ICD-9-CM   1. Generalized weakness  R53.1 780.79   2. 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 Other insomnia  G47.09 780.52       Patient Active Problem List   Diagnosis   • Renal insufficiency   • Hypertensive disorder   • Hypothyroidism   • Type 2 diabetes mellitus with kidney complication, with long-term current use of insulin ([facility])   • Rheumatoid arthritis ([facility])   • Angioedema   • Esophageal dysmotility   • Anemia   • Medically noncompliant   • Myocardial infarction due to demand ischemia ([facility])   • Enteritis   • PRES (posterior reversible encephalopathy syndrome)   • Urine retention   • Klebsiella infection   • Superficial thrombophlebitis   • Generalized weakness   • ESRD (end stage renal disease) ([facility])   • CAD (coronary artery disease)   • Abnormal urinalysis   • Chronic diastolic CHF (congestive heart failure) ([facility])   • Pyelonephritis   • Calculus of gallbladder with acute on chronic cholecystitis without obstruction   • Pleural effusion on right   • Anemia due to chronic kidney disease, on chronic dialysis ([facility])   • Abnormal findings on diagnostic imaging of other specified body structures   • Acute upper respiratory infection   • Agitation   • Alkaline phosphatase raised   • Casts present in urine   • Cellulitis of toe   • Hip pain   • Community acquired pneumonia   • Depressive disorder   • Diarrhea of presumed infectious origin   • Difficult or painful urination   • Disease due to severe acute respiratory syndrome coronavirus 2 (SARS-CoV-2)   • Dyspnea   • Encounter for follow-up examination after completed treatment for conditions other than malignant neoplasm   • H/O: hypothyroidism   • Hyperlipidemia   • Hypomagnesemia   • Intractable  vomiting with nausea   • Leukocytosis   • Luetscher's syndrome   • Need for influenza vaccination   • Restless legs   • Noncompliance with treatment   • Shoulder pain   • Urinary tract infectious disease   • Metabolic encephalopathy   • Abnormal findings on diagnostic imaging of abdomen   • Status post cholecystectomy   • Hyponatremia   • Leukocytosis   • Acute metabolic encephalopathy   • Encephalopathy, toxic   • Acute CVA (cerebrovascular accident) (HCC)   • Intracranial hemorrhage (HCC)   • Stroke (HCC)   • Abnormal urinalysis   • Diabetic muscle infarction (HCC)   • Other insomnia       Past Medical History:   Diagnosis Date   • Acute CVA (cerebrovascular accident) (HCC) 5/1/2022   • Acute on chronic diastolic CHF (congestive heart failure) (HCC)    • CAD (coronary artery disease) 12/20/2021   • Diabetes (HCC)    • Disease of thyroid gland    • GERD (gastroesophageal reflux disease)    • Hyperlipidemia 11/30/2018   • Hypertension    • Rheumatoid arthritis (HCC)        Past Surgical History:   Procedure Laterality Date   • CHOLECYSTECTOMY WITH INTRAOPERATIVE CHOLANGIOGRAM N/A 1/10/2022    Procedure: Laparoscopic cholecystectomy with intraoperative cholangiogram;  Surgeon: Ramana Raygoza MD;  Location: [facility];  Service: General;  Laterality: N/A;   • EYE SURGERY     • HYSTERECTOMY     • INSERTION HEMODIALYSIS CATHETER N/A 12/6/2021    Procedure: HEMODIALYSIS CATHETER INSERTION;  Surgeon: Keli Salazar MD;  Location: [facility] 18/19;  Service: Vascular;  Laterality: N/A;   • INSERTION HEMODIALYSIS CATHETER N/A 5/3/2022    Procedure: TUNNELED CATHETER PLACEMENT;  Surgeon: Keli Salazar MD;  Location: [facility];  Service: Vascular;  Laterality: N/A;   • MUSCLE BIOPSY Left 6/15/2022    Procedure: Left quadriceps muscle biopsy;  Surgeon: Marques Ma MD;  Location: [facility];  Service: Neurosurgery;  Laterality: Left;       PT ASSESSMENT (last 12 hours)     IRF PT  Evaluation and Treatment     Row Name 07/14/22 1012          PT Time and Intention    Document Type daily treatment  -LB     Mode of Treatment physical therapy  -LB     Patient/Family/Caregiver Comments/Observations Pnt supine in bed. C/O L thigh pain.  -LB     Row Name 07/14/22 1012          General Information    Existing Precautions/Restrictions fall  contact precautions  -LB     Row Name 07/14/22 1012          Pain Assessment    Pretreatment Pain Rating 8/10  -LB     Pain Location - Side/Orientation Left  -LB     Pain Location anterior  -LB     Pain Location - other (see comments)  thigh  -LB     Pre/Posttreatment Pain Comment Notified nurse. Pnt had been premedicated.  -LB     Row Name 07/14/22 1012          Bed Mobility    Rolling Left Nelson (Bed Mobility) standby assist  -LB     Rolling Right Nelson (Bed Mobility) standby assist  -LB     Supine-Sit Nelson (Bed Mobility) moderate assist (50% patient effort)  -LB     Assistive Device (Bed Mobility) bed rails;head of bed elevated  -LB     Row Name 07/14/22 1012          Transfers    Bed-Chair Nelson (Transfers) minimum assist (75% patient effort)  -LB     Assistive Device (Bed-Chair Transfers) walker, front-wheeled  -LB     Sit-Stand Nelson (Transfers) minimum assist (75% patient effort);moderate assist (50% patient effort)  min/modA in AM. Up to modA in PM  -LB     Stand-Sit Nelson (Transfers) minimum assist (75% patient effort)  -LB     Row Name 07/14/22 1012          Sit-Stand Transfer    Assistive Device (Sit-Stand Transfers) walker, front-wheeled  -LB     Row Name 07/14/22 1012          Stand-Sit Transfer    Assistive Device (Stand-Sit Transfers) walker, front-wheeled;wheelchair  -LB     Row Name 07/14/22 1012          Gait/Stairs (Locomotion)    Nelson Level (Gait) minimum assist (75% patient effort);1 person to manage equipment  -LB     Assistive Device (Gait) walker, front-wheeled  -LB     Distance in Feet  (Gait) 3' in AM. 35' In PM  -LB     Pattern (Gait) step-to  -LB     Deviations/Abnormal Patterns (Gait) antalgic;gait speed decreased;kenn decreased;stride length decreased;weight shifting decreased  -LB     Bilateral Gait Deviations heel strike decreased;forward flexed posture  -LB     Comment, (Gait/Stairs) gait quite slow. Staes that she is unable to put pressure thru LLE.  -LB     Row Name 07/14/22 1012          Wheelchair Mobility/Management    Method of Wheelchair Locomotion (Mobility) bimanual (upper extremity) propulsion  -LB     Steering [place] (Wheelchair) standby assist  -LB     Turning [place] (Wheelchair) standby assist  -LB     Distance Propelled in Feet (Wheelchair) 75  -LB     Comment, Parts Management (Wheelchair) tactile cues for brake management. Reports that she is unable to see the w/c brakes.  -LB     Row Name 07/14/22 1012          Hip (Therapeutic Exercise)    Hip Isometrics (Therapeutic Exercise) bilateral;gluteal sets;supine;5 second hold;10 repetitions;sitting  -LB     Row Name 07/14/22 1012          Knee (Therapeutic Exercise)    Knee AROM (Therapeutic Exercise) bilateral;LAQ (long arc quad);sitting;10 repetitions  Lnee flex: R 10 reps, L 5 reps  -LB     Knee Isometrics (Therapeutic Exercise) bilateral;quad sets;sitting;10 repetitions  -LB     Row Name 07/14/22 1012          Ankle (Therapeutic Exercise)    Ankle AROM (Therapeutic Exercise) bilateral;dorsiflexion;plantarflexion;sitting;supine;10 repetitions  -LB     Row Name 07/14/22 1012          Positioning and Restraints    Post Treatment Position wheelchair  -LB     In Wheelchair sitting;exit alarm on;with OT  -LB     Row Name 07/14/22 1012          Vital Signs    Intra Systolic BP Rehab 173  supine  -LB     Intra Treatment Diastolic BP 73  -LB           User Key  (r) = Recorded By, (t) = Taken By, (c) = Cosigned By    Initials Name Provider Type    Liz Henriquez, PTA Physical Therapist Assistant              Rash  Left lower thoracic spine (Active)   Distribution localized 07/14/22 0704   Color pink 07/14/22 0704   Configuration/Shape round 07/14/22 0704   Borders irregular 07/14/22 0704   Characteristics dry 07/14/22 0704   Care, Rash open to air;antimicrobial agent applied 07/14/22 0704       Wound 06/15/22 1312 Left anterior thigh Incision (Active)   Dressing Appearance open to air;no drainage 07/13/22 2033   Closure Liquid skin adhesive 07/14/22 0704   Base clean;dry 07/14/22 0704   Drainage Amount none 07/14/22 0704   Dressing Care open to air 07/13/22 2033     Physical Therapy Education                 Title: PT OT SLP Therapies (In Progress)     Topic: Physical Therapy (In Progress)     Point: Mobility training (In Progress)     Learning Progress Summary           Patient Acceptance, E, NR by LB at 7/14/2022 1143   Family Acceptance, E,D, VU,NR by LB at 7/13/2022 1456    Comment: Stairs, (dgt practiced). 2 Helpers for stairs used.  Attempted car tsf to dgts car. Unable to perform as the SUV was too high for pnt. A platform step was offered for practice, but pnt declined.  Pnt refused amb during fam teaching, 2ary to L thigh pain.   Significant Other Refuses, E, NR by KP at 7/4/2022 1245    Comment:  did not show for teaching      Show all documentation for this point (39)                 Point: Home exercise program (In Progress)     Learning Progress Summary           Patient Acceptance, E,TB, NR by MS at 7/11/2022 1434      Show all documentation for this point (13)                 Point: Body mechanics (Done)     Learning Progress Summary           Patient Acceptance, E,TB,D, VU,NR by JS at 6/25/2022 1434   Family Acceptance, E, VU by LB at 7/13/2022 1504      Show all documentation for this point (8)                 Point: Precautions (Done)     Learning Progress Summary           Patient Acceptance, E,TB,D, VU,NR by JS at 6/25/2022 1434      Show all documentation for this point (8)                              User Key     Initials Effective Dates Name Provider Type Discipline    JS 06/16/21 -  Fany Lima, PT Physical Therapist PT    LB 03/07/18 -  Liz Rodriguez PTA Physical Therapist Assistant PT    KP 06/16/21 -  Alyx Whiting, PT Physical Therapist PT    MS 06/16/21 -  Teagan Santos PT Physical Therapist PT                PT Recommendation and Plan      Patient was wearing a face mask during this therapy encounter. Therapist used appropriate personal protective equipment including mask and gloves.  Mask used was standard procedure mask. Appropriate PPE was worn during the entire therapy session. Hand hygiene was completed before and after therapy session. Patient is not in enhanced droplet precautions.                        Time Calculation:      PT Charges     Row Name 07/14/22 1240 07/14/22 1011          Time Calculation    Start Time 1230  -LB 0930  -LB     Stop Time 1300  -LB 1030  -LB     Time Calculation (min) 30 min  -LB 60 min  -LB           User Key  (r) = Recorded By, (t) = Taken By, (c) = Cosigned By    Initials Name Provider Type    LB Liz Rodriguez PTA Physical Therapist Assistant                Therapy Charges for Today     Code Description Service Date Service Provider Modifiers Qty    94935317027 HC PT THER PROC EA 15 MIN 7/13/2022 Liz Rodriguez PTA GP 6    25452315749 HC PT THER PROC EA 15 MIN 7/14/2022 Liz Rodriguez PTA GP 6            PT G-Codes  AM-PAC 6 Clicks Score (PT): 17      Liz Rodriguez PTA  7/14/2022

## 2022-07-14 NOTE — PLAN OF CARE
Goal Outcome Evaluation:  Plan of Care Reviewed With: patient        Progress: improving  Outcome Evaluation: Mrs. Martinez is alert and oriented forgetful, pleasant and cooperative, continues with left thigh pain burning aching sensation, taking scheduled gabapentin and PRN Oxy IR, did ok in therapies although she reports not as well as yesterday due to her left thigh pain, trace edema to bilateral feet R>L, going to RA appointment booker by wheelchair van to get HD booker morning before appt, HD catheter clean and intact.

## 2022-07-14 NOTE — PROGRESS NOTES
Name: Zaina Martinez ADMIT: 2022   : 1965  PCP: Karson Oreilly MD    MRN: [id number] LOS: 62 days   AGE/SEX: 56 y.o. female  ROOM: [facility]     Subjective   Subjective   C/O lt leg pain, chronic. Appetite wnl. + BM. Intermittent dyspnea w/exertion, not requiring O2    Review of Systems   Constitutional: Negative for appetite change and fever.   HENT: Negative for congestion.    Respiratory: Positive for shortness of breath.         Intermittent dyspnea w/exertion   Cardiovascular: Positive for leg swelling. Negative for chest pain.   Gastrointestinal: Negative for constipation and nausea.   Genitourinary: Negative for difficulty urinating.   Musculoskeletal: Positive for myalgias.   Skin: Negative for rash.   Neurological: Negative for headaches.   Psychiatric/Behavioral: Negative for sleep disturbance.        Objective   Objective   Vital Signs  Temp:  [98.2 °F (36.8 °C)-98.8 °F (37.1 °C)] 98.8 °F (37.1 °C)  Heart Rate:  [50-57] 50  Resp:  [17-18] 18  BP: (144-193)/(70-83) 153/70  SpO2:  [94 %-98 %] 98 %  on   ;   Device (Oxygen Therapy): room air  Body mass index is 24.89 kg/m².  Physical Exam  Vitals and nursing note reviewed.   Constitutional:       General: She is not in acute distress.     Appearance: She is ill-appearing. She is not toxic-appearing.   HENT:      Head: Normocephalic.      Mouth/Throat:      Mouth: Mucous membranes are moist.   Eyes:      Conjunctiva/sclera: Conjunctivae normal.   Cardiovascular:      Rate and Rhythm: Normal rate and regular rhythm.   Pulmonary:      Effort: Pulmonary effort is normal. No respiratory distress.      Breath sounds: No wheezing or rales.   Abdominal:      General: Bowel sounds are normal.      Palpations: Abdomen is soft.   Musculoskeletal:      Cervical back: Neck supple.      Right lower leg: Edema present.      Left lower leg: Edema present.      Comments: 1-2+ lower legs  +lt thigh edema   Skin:     General: Skin is warm and dry.    Neurological:      Mental Status: She is alert and oriented to person, place, and time.   Psychiatric:         Mood and Affect: Mood normal.         Behavior: Behavior normal.         Results Review     I reviewed the patient's new clinical results.  Results from last 7 days   Lab Units 07/12/22  1052 07/11/22  2325 07/10/22  0503 07/09/22  0711   WBC 10*3/mm3 6.34 7.02 8.20 7.81   HEMOGLOBIN g/dL 8.0* 8.3* 7.9* 8.1*   PLATELETS 10*3/mm3 149 156 170 173     Results from last 7 days   Lab Units 07/13/22  1053 07/11/22  2325 07/10/22  0503 07/09/22  0710   SODIUM mmol/L 129* 133* 133* 131*   POTASSIUM mmol/L 4.6 4.2 4.4 3.8   CHLORIDE mmol/L 94* 99 96* 96*   CO2 mmol/L 23.0 24.0 24.0 24.6   BUN mg/dL 35* 22* 34* 22*   CREATININE mg/dL 3.63* 2.31* 3.17* 2.17*   GLUCOSE mg/dL 304* 202* 156* 167*   EGFR mL/min/1.73 14.1* 24.3* 16.6* 26.2*     Results from last 7 days   Lab Units 07/12/22  1052 07/11/22  2325 07/10/22  0503 07/09/22  0710   ALBUMIN g/dL 2.90* 3.10* 2.80* 2.90*   BILIRUBIN mg/dL 0.5 0.5 0.4 0.5   ALK PHOS U/L 551* 571* 572* 597*   AST (SGOT) U/L 31 34* 32 35*   ALT (SGPT) U/L 18 18 18 21     Results from last 7 days   Lab Units 07/13/22  1053 07/12/22  1052 07/11/22  2325 07/10/22  0503 07/09/22  0710   CALCIUM mg/dL 7.7*  --  8.3* 7.8* 7.6*   ALBUMIN g/dL  --  2.90* 3.10* 2.80* 2.90*   PHOSPHORUS mg/dL 4.3  --  3.0 3.9 3.1       Glucose   Date/Time Value Ref Range Status   07/14/2022 1122 225 (H) 70 - 130 mg/dL Final     Comment:     Meter: [id number] : [id number] Cricket Harley    07/14/2022 0625 138 (H) 70 - 130 mg/dL Final     Comment:     Meter: [id number] : [id number] León SAAVEDRA   07/14/2022 0158 165 (H) 70 - 130 mg/dL Final     Comment:     Meter: [id number] : [id number] Frank Dunn RN   07/13/2022 2058 171 (H) 70 - 130 mg/dL Final     Comment:     Meter: [id number] : [id number] [facility]   07/13/2022 1843 113 70 - 130 mg/dL Final     Comment:     Meter:  [id number] : [id number] Jose Angel Rod NA   07/13/2022 1041 285 (H) 70 - 130 mg/dL Final     Comment:     Meter: [id number] : [id number] Cricket Harley NA   07/13/2022 0630 149 (H) 70 - 130 mg/dL Final     Comment:     Meter: [id number] : [id number] León Young NA       No radiology results for the last day  Scheduled Medications  aspirin, 81 mg, Oral, Daily  b complex-vitamin c-folic acid, 1 tablet, Oral, Daily  carvedilol, 25 mg, Oral, BID With Meals  cloNIDine, 0.2 mg, Oral, Q12H  gabapentin, 100 mg, Oral, Q24H  gabapentin, 300 mg, Oral, BID  hydrALAZINE, 100 mg, Oral, Q8H  [START ON 7/15/2022] insulin glargine, 10 Units, Subcutaneous, QAM  insulin lispro, 0-7 Units, Subcutaneous, TID AC  levothyroxine, 200 mcg, Oral, Q AM  lidocaine, 1 patch, Transdermal, Q24H  losartan, 100 mg, Oral, Q24H  miconazole, 1 application, Topical, Q12H  pantoprazole, 40 mg, Oral, Q AM  rOPINIRole, 0.75 mg, Oral, Nightly  sertraline, 150 mg, Oral, Daily  sodium zirconium cyclosilicate, 5 g, Oral, Daily  tamsulosin, 0.4 mg, Oral, Daily    Infusions   Diet  Diet Regular; Thin; Consistent Carbohydrate, Low Potassium, Renal, Daily Fluid Restriction; Other; 1,200; 2 gm (50 mEq)       Assessment/Plan     Active Hospital Problems    Diagnosis  POA   • **Stroke (HCC) [I63.9]  Yes   • 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       56 y.o. female admitted with Stroke (HCC).    -CVA: Continue rehab  -Diabetic muscle infarction: Pain control. Continue ASA. No new  findings on workup. Needs PET scan as outpatient & Rheumatology follow up  -Chronic diastolic CHF: Intermittent JACKSON. Echo 4/22 EF 66%. Daily fluid restriction  -ESRD/Hyponatremia: Nephrology managing  -Hypothyroidism: Repeat TFT's with elevated TSH, low FT4. Levothyroxine increased 7/13. Needs repeat TFT's in 6 weeks  -DM: Elevated BG. Increase Lantus to 10 units (home dose). Continue correctional scale      RIKA William  [facility]  07/14/22  15:53 EDT

## 2022-07-14 NOTE — NURSING NOTE
Pt has had elevated bp this shift. RTN hydralazine 100mg administered; continued to stay in the upper 180's. PRN hydralazine 10mg administered at 0045. Rechecked at 0200 at read 179/83 manually. Will continue to monitor. Pt resting well with eyes closed. No s/s of discomfort.

## 2022-07-14 NOTE — THERAPY TREATMENT NOTE
Inpatient Rehabilitation - Occupational Therapy Treatment Note    [facility]     Patient Name: Zaina Martinez  : 1965  MRN: [id number]    Today's Date: 2022                 Admit Date: 2022         ICD-10-CM ICD-9-CM   1. Generalized weakness  R53.1 780.79   2. Diabetic muscle infarction ([facility])  E11.69 250.80    M62.20 728.89   3. Other insomnia  G47.09 780.52       Patient Active Problem List   Diagnosis   • Renal insufficiency   • Hypertensive disorder   • Hypothyroidism   • Type 2 diabetes mellitus with kidney complication, with long-term current use of insulin ([facility])   • Rheumatoid arthritis ([facility])   • Angioedema   • Esophageal dysmotility   • Anemia   • Medically noncompliant   • Myocardial infarction due to demand ischemia ([facility])   • Enteritis   • PRES (posterior reversible encephalopathy syndrome)   • Urine retention   • Klebsiella infection   • Superficial thrombophlebitis   • Generalized weakness   • ESRD (end stage renal disease) ([facility])   • CAD (coronary artery disease)   • Abnormal urinalysis   • Chronic diastolic CHF (congestive heart failure) ([facility])   • Pyelonephritis   • Calculus of gallbladder with acute on chronic cholecystitis without obstruction   • Pleural effusion on right   • Anemia due to chronic kidney disease, on chronic dialysis ([facility])   • Abnormal findings on diagnostic imaging of other specified body structures   • Acute upper respiratory infection   • Agitation   • Alkaline phosphatase raised   • Casts present in urine   • Cellulitis of toe   • Hip pain   • Community acquired pneumonia   • Depressive disorder   • Diarrhea of presumed infectious origin   • Difficult or painful urination   • Disease due to severe acute respiratory syndrome coronavirus 2 (SARS-CoV-2)   • Dyspnea   • Encounter for follow-up examination after completed treatment for conditions other than malignant neoplasm   • H/O: hypothyroidism   • Hyperlipidemia   • Hypomagnesemia   • Intractable vomiting with  nausea   • Leukocytosis   • Luetscher's syndrome   • Need for influenza vaccination   • Restless legs   • Noncompliance with treatment   • Shoulder pain   • Urinary tract infectious disease   • Metabolic encephalopathy   • Abnormal findings on diagnostic imaging of abdomen   • Status post cholecystectomy   • Hyponatremia   • Leukocytosis   • Acute metabolic encephalopathy   • Encephalopathy, toxic   • Acute CVA (cerebrovascular accident) (H[facility])   • Intracranial hemorrhage (HCC)   • Stroke (HCC)   • Abnormal urinalysis   • Diabetic muscle infarction (HCC)   • Other insomnia       Past Medical History:   Diagnosis Date   • Acute CVA (cerebrovascular accident) (HCC) 5/1/2022   • Acute on chronic diastolic CHF (congestive heart failure) (HCC)    • CAD (coronary artery disease) 12/20/2021   • Diabetes (HCC)    • Disease of thyroid gland    • GERD (gastroesophageal reflux disease)    • Hyperlipidemia 11/30/2018   • Hypertension    • Rheumatoid arthritis (HCC)        Past Surgical History:   Procedure Laterality Date   • CHOLECYSTECTOMY WITH INTRAOPERATIVE CHOLANGIOGRAM N/A 1/10/2022    Procedure: Laparoscopic cholecystectomy with intraoperative cholangiogram;  Surgeon: Ramana Raygoza MD;  Location: [facility];  Service: General;  Laterality: N/A;   • EYE SURGERY     • HYSTERECTOMY     • INSERTION HEMODIALYSIS CATHETER N/A 12/6/2021    Procedure: HEMODIALYSIS CATHETER INSERTION;  Surgeon: Keli Salazar MD;  Location: [facility] 18/19;  Service: Vascular;  Laterality: N/A;   • INSERTION HEMODIALYSIS CATHETER N/A 5/3/2022    Procedure: TUNNELED CATHETER PLACEMENT;  Surgeon: Keli Salazar MD;  Location: [facility] OR;  Service: Vascular;  Laterality: N/A;   • MUSCLE BIOPSY Left 6/15/2022    Procedure: Left quadriceps muscle biopsy;  Surgeon: Marques Ma MD;  Location: [facility] OR;  Service: Neurosurgery;  Laterality: Left;             IRF OT ASSESSMENT FLOWSHEET (last 12 hours)     IRF OT  Evaluation and Treatment     Row Name 07/14/22 1337          OT Time and Intention    Document Type daily treatment  -AF     Mode of Treatment occupational therapy  -AF     Row Name 07/14/22 1337          General Information    Patient/Family/Caregiver Comments/Observations pt sitting up in we/c after PT in AM and PM sessions  -AF     Existing Precautions/Restrictions fall  contact Precautions  -AF     Row Name 07/14/22 1337          Pain Assessment    Pretreatment Pain Rating 7/10  -AF     Posttreatment Pain Rating 7/10  -AF     Pain Location - Side/Orientation Left  -AF     Pain Location - --  thigh  -AF     Row Name 07/14/22 1337          Cognition/Psychosocial    Affect/Mental Status (Cognition) flat/blunted affect  -AF     Orientation Status (Cognition) oriented x 3  -AF     Follows Commands (Cognition) follows one-step commands  -AF     Personal Safety Interventions gait belt;fall prevention program maintained;nonskid shoes/slippers when out of bed  -AF     Row Name 07/14/22 1337          Bathing    [place] Level (Bathing) bathing skills;upper body;set up  -AF     Position (Bathing) sink side;supported sitting  -AF     Row Name 07/14/22 1337          Upper Body Dressing    [place] Level (Upper Body Dressing) upper body dressing skills;set up assistance  -AF     Position (Upper Body Dressing) supported sitting  -AF     Comment (Upper Body Dressing) w/c level  -AF     Row Name 07/14/22 1337          Lower Body Dressing    Comment (Lower Body Dressing) Assistance to doff shoes secdoanryt o swelling  -AF     Row Name 07/14/22 1337          Grooming    [place] Level (Grooming) grooming skills;set up  -AF     Position (Grooming) supported sitting  -AF     Comment (Grooming) w/c level  -AF     Row Name 07/14/22 1337          Bed Mobility    Sit-Supine [place] (Bed Mobility) minimum assist (75% patient effort);verbal cues  -AF     Row Name 07/14/22 1337          Transfers    Chair-Bed [place]  (Transfers) moderate assist (50% patient effort);verbal cues  -AF     Row Name 07/14/22 1337          Chair-Bed Transfer    Assistive Device (Chair-Bed Transfers) wheelchair  -AF     Comment, (Chair-Bed Transfer) stand pivot  -AF     Row Name 07/14/22 1337          Motor Skills    Coordination fine motor deficit  theraputty exs with yellow putty, working on strethenging and manipulation with MIN difficulty  -AF     Row Name 07/14/22 1337          Shoulder (Therapeutic Exercise)    Shoulder Strengthening (Therapeutic Exercise) bilateral;external rotation;internal rotation;scapular stabilization;sitting;2 lb free weight;15 repititions;2 sets  -AF     Row Name 07/14/22 1337          Elbow/Forearm (Therapeutic Exercise)    Elbow/Forearm Strengthening (Therapeutic Exercise) bilateral;flexion;extension;pronation;supination;sitting;2 lb free weight;15 repititions;2 sets  -AF     Row Name 07/14/22 1337          Hand (Therapeutic Exercise)    Hand Strengthening (Therapeutic Exercise) bilateral; strengthening;hand gripper;20 repititions;2 sets  -AF     Row Name 07/14/22 1337          Positioning and Restraints    Pre-Treatment Position sitting in chair/recliner  -AF     Post Treatment Position bed  -AF     In Bed supine;call light within reach;encouraged to call for assist;exit alarm on;notified nsg  in PM  -AF     In Wheelchair sitting;encouraged to call for assist;call light within reach;exit alarm on  in AM  -AF           User Key  (r) = Recorded By, (t) = Taken By, (c) = Cosigned By    Initials Name Effective Dates    AF Tasha Helm, OTR 06/16/21 -                  Occupational Therapy Education                 Title: PT OT SLP Therapies (In Progress)     Topic: Occupational Therapy (Done)     Point: ADL training (Done)     Description:   Instruct learner(s) on proper safety adaptation and remediation techniques during self care or transfers.   Instruct in proper use of assistive devices.              Learning  Progress Summary           Patient Acceptance, E,TB,D, VU,NR by  at 6/25/2022 1434      Show all documentation for this point (2)                 Point: Home exercise program (Done)     Description:   Instruct learner(s) on appropriate technique for monitoring, assisting and/or progressing therapeutic exercises/activities.              Learning Progress Summary           Patient Acceptance, E,D,H, VU by AF at 6/30/2022 1544    Comment: review of HEP with hand weights and theraband      Show all documentation for this point (2)                 Point: Precautions (Done)     Description:   Instruct learner(s) on prescribed precautions during self-care and functional transfers.              Learning Progress Summary           Patient Acceptance, E,TB,D, VU,NR by  at 6/25/2022 1434      Show all documentation for this point (2)                 Point: Body mechanics (Done)     Description:   Instruct learner(s) on proper positioning and spine alignment during self-care, functional mobility activities and/or exercises.              Learning Progress Summary           Patient Acceptance, E,TB,D, VU,NR by  at 6/25/2022 1434      Show all documentation for this point (2)                             User Key     Initials Effective Dates Name Provider Type Discipline     06/16/21 -  Fany Lima, PT Physical Therapist PT    AF 06/16/21 -  Tasha Helm, OTR Occupational Therapist OT                    OT Recommendation and Plan                         Time Calculation:      Time Calculation- OT     Row Name 07/14/22 1506 07/14/22 1504          Time Calculation- OT    OT Start Time 1300  -AF 1030  -AF     OT Stop Time 1400  -AF 1100  -AF     OT Time Calculation (min) 60 min  -AF 30 min  -AF           User Key  (r) = Recorded By, (t) = Taken By, (c) = Cosigned By    Initials Name Provider Type    AF Tasha Helm, OTR Occupational Therapist              Therapy Charges for Today     Code Description Service Date Service  Provider Modifiers Qty    75135773491 HC OT SELF CARE/MGMT/TRAIN EA 15 MIN 7/13/2022 Tasha Helm, OTR GO 4    72497980747 HC OT THER PROC EA 15 MIN 7/13/2022 Tasha Helm, OTR GO 1    84917744629 HC OT SELF CARE/MGMT/TRAIN EA 15 MIN 7/13/2022 Tasha Helm, OTR GO 1    77041593539 HC OT THER PROC EA 15 MIN 7/13/2022 Tasha Helm, OTR GO 1    96190328050 HC OT SELF CARE/MGMT/TRAIN EA 15 MIN 7/14/2022 Tasha Helm, OTR GO 2    76792053142 HC OT THER PROC EA 15 MIN 7/14/2022 Tasha Helm, OTR GO 4                   Tasha Helm OTR  7/14/2022

## 2022-07-14 NOTE — PROGRESS NOTES
Inpatient Rehabilitation Plan of Care Note    Plan of Care  Care Plan Reviewed - No updates at this time.    Body Function Structure    [RN] Skin Integrity(Active)  Current Status(07/14/2022): Rash to back and lower buttocks.  Weekly Goal(07/21/2022): Rash to resolve.  Discharge Goal: No skin problem.    Performed Intervention(s)  Ointment per MD order      Body Systems    [RN] Genitourinary(Active)  Current Status(07/14/2022): Patient is a M,W,F HD patient with a tunneled  catheter to the R chest. Pt produces little urine.  pt oliguric. New cath order  6/20: straight cath daily and prn until cath residual < 300 ml x3 days.  Weekly Goal(07/19/2022): Pt will plan to transition to community dialysis.  Discharge Goal: Pt will transition to community dialysis.    [RN] Endocrine(Active)  Current Status(07/14/2022): Patient is a diabetic.  Weekly Goal(07/19/2022): Blood glucose will be controlled.  Discharge Goal: Patient will have medication regimen that she can manage at  home.    Performed Intervention(s)  Dialysis mwf  Monitor weight and I/O.  DM educator consult.  Accuchecks as ordered.      Pain    [RN] Pain Management(Active)  Current Status(07/14/2022): Pt has consistent pain to L groin/L medial upper  thigh . Muscle biopsy site to L thigh .  Weekly Goal(07/19/2022): Pain will be controlled.  Discharge Goal: Pt will have pain management regimen that she can follow at  home.    Performed Intervention(s)  Pain meds prn .  Ice to LLE per order      Psychosocial    [RN] Coping/Adjustment(Active)  Current Status(07/14/2022): Pt is A/Ox4. Pt is at increased risk for reduced  coping r/t hospitalization and comorbidities. Pt has been calling for assistance  appropriately as needed. Pt has supportive family.  Weekly Goal(07/19/2022): Pt will verbalize needs, feelings, concerns to staff as  needed.  Discharge Goal: Pt will verbalize adequate coping strategies to use at home.    Performed Intervention(s)  Provide distraction  and/or relaxation as needed.  Allow extra time for patient to verbalize needs, concerns, feelings, etc.      Safety    [RN] Potential for Injury(Active)  Current Status(07/14/2022): Patient is at increased risk for falls/injury r/t  hospitalization and generalized weakness.  Weekly Goal(07/19/2022): Patient will call appropriately for assistance as  needed.  Discharge Goal: Patient will remain free from falls/injury.    Performed Intervention(s)  Fall precautions: bed low and locked, chair alarm and bed alarms in use, nonskid  socks and gait belt in use, call light and personal belongings within reach.  Hourly rounding.      Sphincter Control    [RN] Bowel and bladder (Active)  Current Status(07/14/2022): Patient can be continent and is often incontinent of  stool. Patient is oliguric and is an HD patient. New cath order 6/20.  Weekly Goal(07/19/2022): Pt will be continent 75% of the time. Patient will have  BM at least q3 days or within normal patterns.  Discharge Goal: Patient will be continent 100% of the time.    Performed Intervention(s)  Monitor I/O.  Bowel meds prn.    Signed by: Mona Marie RN

## 2022-07-15 LAB
DEPRECATED RDW RBC AUTO: 52.8 FL (ref 37–54)
ERYTHROCYTE [DISTWIDTH] IN BLOOD BY AUTOMATED COUNT: 17.5 % (ref 12.3–15.4)
GLUCOSE BLDC GLUCOMTR-MCNC: 119 MG/DL (ref 70–130)
GLUCOSE BLDC GLUCOMTR-MCNC: 162 MG/DL (ref 70–130)
GLUCOSE BLDC GLUCOMTR-MCNC: 171 MG/DL (ref 70–130)
GLUCOSE BLDC GLUCOMTR-MCNC: 177 MG/DL (ref 70–130)
GLUCOSE BLDC GLUCOMTR-MCNC: 64 MG/DL (ref 70–130)
GLUCOSE BLDC GLUCOMTR-MCNC: 65 MG/DL (ref 70–130)
HCT VFR BLD AUTO: 27.4 % (ref 34–46.6)
HGB BLD-MCNC: 8.5 G/DL (ref 12–15.9)
MCH RBC QN AUTO: 25.7 PG (ref 26.6–33)
MCHC RBC AUTO-ENTMCNC: 31 G/DL (ref 31.5–35.7)
MCV RBC AUTO: 82.8 FL (ref 79–97)
PLATELET # BLD AUTO: 187 10*3/MM3 (ref 140–450)
PMV BLD AUTO: 9.8 FL (ref 6–12)
RBC # BLD AUTO: 3.31 10*6/MM3 (ref 3.77–5.28)
WBC NRBC COR # BLD: 11.29 10*3/MM3 (ref 3.4–10.8)

## 2022-07-15 PROCEDURE — 80053 COMPREHEN METABOLIC PANEL: CPT | Performed by: INTERNAL MEDICINE

## 2022-07-15 PROCEDURE — 63710000001 INSULIN LISPRO (HUMAN) PER 5 UNITS: Performed by: HOSPITALIST

## 2022-07-15 PROCEDURE — 97110 THERAPEUTIC EXERCISES: CPT

## 2022-07-15 PROCEDURE — 85027 COMPLETE CBC AUTOMATED: CPT | Performed by: INTERNAL MEDICINE

## 2022-07-15 PROCEDURE — 25010000002 HEPARIN (PORCINE) PER 1000 UNITS: Performed by: PHYSICAL MEDICINE & REHABILITATION

## 2022-07-15 PROCEDURE — 82962 GLUCOSE BLOOD TEST: CPT

## 2022-07-15 RX ADMIN — INSULIN LISPRO 2 UNITS: 100 INJECTION, SOLUTION INTRAVENOUS; SUBCUTANEOUS at 08:25

## 2022-07-15 RX ADMIN — LOSARTAN POTASSIUM 100 MG: 100 TABLET, FILM COATED ORAL at 08:31

## 2022-07-15 RX ADMIN — CARVEDILOL 25 MG: 25 TABLET, FILM COATED ORAL at 17:59

## 2022-07-15 RX ADMIN — TAMSULOSIN HYDROCHLORIDE 0.4 MG: 0.4 CAPSULE ORAL at 08:25

## 2022-07-15 RX ADMIN — OXYCODONE 5 MG: 5 TABLET ORAL at 05:50

## 2022-07-15 RX ADMIN — SODIUM ZIRCONIUM CYCLOSILICATE 5 G: 5 POWDER, FOR SUSPENSION ORAL at 14:34

## 2022-07-15 RX ADMIN — ASPIRIN 81 MG: 81 TABLET, COATED ORAL at 14:34

## 2022-07-15 RX ADMIN — INSULIN GLARGINE-YFGN 10 UNITS: 100 INJECTION, SOLUTION SUBCUTANEOUS at 08:24

## 2022-07-15 RX ADMIN — CLONIDINE HYDROCHLORIDE 0.2 MG: 0.1 TABLET ORAL at 08:25

## 2022-07-15 RX ADMIN — Medication 1 TABLET: at 14:33

## 2022-07-15 RX ADMIN — MICONAZOLE NITRATE 1 APPLICATION: 2 CREAM TOPICAL at 19:40

## 2022-07-15 RX ADMIN — HYDRALAZINE HYDROCHLORIDE 100 MG: 50 TABLET, FILM COATED ORAL at 05:47

## 2022-07-15 RX ADMIN — LEVOTHYROXINE SODIUM 200 MCG: 0.1 TABLET ORAL at 08:25

## 2022-07-15 RX ADMIN — OXYCODONE 5 MG: 5 TABLET ORAL at 15:15

## 2022-07-15 RX ADMIN — HYDRALAZINE HYDROCHLORIDE 100 MG: 50 TABLET, FILM COATED ORAL at 22:16

## 2022-07-15 RX ADMIN — ROPINIROLE HYDROCHLORIDE 0.75 MG: 0.5 TABLET, FILM COATED ORAL at 19:38

## 2022-07-15 RX ADMIN — Medication 1 TABLET: at 08:25

## 2022-07-15 RX ADMIN — OXYCODONE 5 MG: 5 TABLET ORAL at 19:39

## 2022-07-15 RX ADMIN — OXYCODONE 5 MG: 5 TABLET ORAL at 10:32

## 2022-07-15 RX ADMIN — GABAPENTIN 300 MG: 300 CAPSULE ORAL at 19:38

## 2022-07-15 RX ADMIN — CARVEDILOL 25 MG: 25 TABLET, FILM COATED ORAL at 08:25

## 2022-07-15 RX ADMIN — HEPARIN SODIUM 3800 UNITS: 1000 INJECTION INTRAVENOUS; SUBCUTANEOUS at 09:23

## 2022-07-15 RX ADMIN — SERTRALINE 150 MG: 100 TABLET, FILM COATED ORAL at 08:25

## 2022-07-15 RX ADMIN — CLONIDINE HYDROCHLORIDE 0.2 MG: 0.1 TABLET ORAL at 19:38

## 2022-07-15 RX ADMIN — GABAPENTIN 100 MG: 100 CAPSULE ORAL at 14:33

## 2022-07-15 RX ADMIN — PANTOPRAZOLE SODIUM 40 MG: 40 TABLET, DELAYED RELEASE ORAL at 05:47

## 2022-07-15 NOTE — SIGNIFICANT NOTE
07/15/22 1500   OTHER   Discipline occupational therapist   Rehab Time/Intention   Session Not Performed patient unavailable for treatment  (pt off floor in AM for HD and off to MD appointment in PM session)   Recommendation   PT - Next Appointment 07/18/22

## 2022-07-15 NOTE — PROGRESS NOTES
Nursing left note about  visiting yesterday evening and wanting to practice transfers in/out of his car, which he initially thought was too low for patient to get in/out of. Nursing practiced car transfer with patient and  and she was able to get in/out with assistance. Spoke with  and daughter, by phone this morning.  wants to take patient now by car to doctor appointment this afternoon. Called [facility] and cancelled wheelchair van transport. Daughter called 's office to see if a staff person could assist with transfer if  needed help once they arrived and was told staff could come out to assist if needed. Daughter also stated she will be in the area so could also go assist if needed.  and daughter feel they could provide transportation for patient to her outpatient dialysis center so daughter would like to see if patient could go to [facility] since this facility would be close to their home. Left voicemail for [facility] liaison to call back regarding bed availability and if could possibly accept patient if family willing to provide transport to/from dialysis. Will continue to work on d/c placement.

## 2022-07-15 NOTE — PROGRESS NOTES
[facility] Progress Note      Patient Name: Zaina Martinez  : 1965  MRN: [id number]  Primary Care Physician:  Karson Oreilly MD  Date of admission: 2022    Subjective     Interval History:   Follow up ESRD. .   Labile blood pressure    Review of Systems:   Denies chest pain or shortness of breath    Objective     Vitals:   Temp:  [98 °F (36.7 °C)-98.8 °F (37.1 °C)] 98 °F (36.7 °C)  Heart Rate:  [50-58] 52  Resp:  [16-18] 17  BP: (106-179)/(67-76) 179/70    Intake/Output Summary (Last 24 hours) at 7/15/2022 1027  Last data filed at 7/15/2022 0745  Gross per 24 hour   Intake 838 ml   Output --   Net 838 ml     Seen on dialysis    Physical Exam:   General:  in bed  Conversant.    HEENT: oral mucosa moist.   Neck:RIJ TDC  Lungs:clear to auscultation. Unlabored.    Heart:RRR no s3 or rub. 2 /6 early syst m  Abdomen: + bs,soft, nontender   Extremities:1+ -2+ lower ext edema.    Neuro: speech more fluent . Moves all 4 ext.     Scheduled Meds:     aspirin, 81 mg, Oral, Daily  b complex-vitamin c-folic acid, 1 tablet, Oral, Daily  carvedilol, 25 mg, Oral, BID With Meals  cloNIDine, 0.2 mg, Oral, Q12H  gabapentin, 100 mg, Oral, Q24H  gabapentin, 300 mg, Oral, BID  hydrALAZINE, 100 mg, Oral, Q8H  insulin glargine, 10 Units, Subcutaneous, QAM  insulin lispro, 0-7 Units, Subcutaneous, TID AC  levothyroxine, 200 mcg, Oral, Q AM  lidocaine, 1 patch, Transdermal, Q24H  losartan, 100 mg, Oral, Q24H  miconazole, 1 application, Topical, Q12H  pantoprazole, 40 mg, Oral, Q AM  rOPINIRole, 0.75 mg, Oral, Nightly  sertraline, 150 mg, Oral, Daily  sodium zirconium cyclosilicate, 5 g, Oral, Daily  tamsulosin, 0.4 mg, Oral, Daily      IV Meds:        Results Reviewed:   I have personally reviewed the results from the time of this admission to 7/15/2022 10:27 EDT     Results from last 7 days   Lab Units 22  1053 22  1052 22  2325 07/10/22  0503   SODIUM mmol/L 129*  --  133* 133*    POTASSIUM mmol/L 4.6  --  4.2 4.4   CHLORIDE mmol/L 94*  --  99 96*   CO2 mmol/L 23.0  --  24.0 24.0   BUN mg/dL 35*  --  22* 34*   CREATININE mg/dL 3.63*  --  2.31* 3.17*   CALCIUM mg/dL 7.7*  --  8.3* 7.8*   BILIRUBIN mg/dL  --  0.5 0.5 0.4   ALK PHOS U/L  --  551* 571* 572*   ALT (SGPT) U/L  --  18 18 18   AST (SGOT) U/L  --  31 34* 32   GLUCOSE mg/dL 304*  --  202* 156*       Estimated Creatinine Clearance: 15.8 mL/min (A) (by C-G formula based on SCr of 3.63 mg/dL (H)).    Results from last 7 days   Lab Units 07/13/22  1053 07/11/22  2325 07/10/22  0503   PHOSPHORUS mg/dL 4.3 3.0 3.9             Results from last 7 days   Lab Units 07/12/22  1052 07/11/22  2325 07/10/22  0503 07/09/22  0711   WBC 10*3/mm3 6.34 7.02 8.20 7.81   HEMOGLOBIN g/dL 8.0* 8.3* 7.9* 8.1*   PLATELETS 10*3/mm3 149 156 170 173             Assessment / Plan     ASSESSMENT:  1.    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 HD MWF.  2.  Intracerebral bleed and altered mental status.  Rehab .   3.  Infected TDC, s/p removal 5/1. Replaced. Staph aureus. completed vancomycin.  4.  DM2    5.  Coronary artery disease  6.  Acute on chronic diastolic dysfunction . Volume off with HD.   7.  Anemia of CKD, on long-acting ANDRAE as an outpatient. Hg stable yesterday .  On procrit here.  Will stop it until BP better controlled.   8. Hypothyroidism.  TSH still very high and Free t4 low .  LHA increased replacement .  This is certainly contributing to her edema.   9. 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 On ASA.     PLAN:  1.  HD Monday Wednesday Friday  2. MWF labs would be fine with renal .      Anil Arauz MD  07/15/22  10:27 EDT    [facility]  [phone]

## 2022-07-15 NOTE — NURSING NOTE
Dialysis complete, removed 3.5 liters with this treatment and no complications noted.  Vital signs are in epic.

## 2022-07-15 NOTE — PROGRESS NOTES
CC: Debility    SUBJECTIVE:    Patient seen on hemodialysis today.  3.5 L being removed.  She reports that she is tolerating dialysis.  Burning pain at the left thigh-diffuse-unchanged.  Has some pain at the left knee.  Does not describe any new distal weakness.  Has appointment with rheumatology today.    Patient reviewed with her rheumatologist earlier this morning.           OBJECTIVE:  Vitals:    07/15/22 1300   BP: 171/79   Pulse: 52   Resp: 18   Temp: 98.1 °F (36.7 °C)   SpO2:        GENERAL: NAD  MENTAL STATUS: Awake alert  HEENT:  NCAT  CARDIOVASCULAR: Sinus rhythm    CHEST:  hemodialysis access right chest  RESPIRATORY: Normal respirations.     ABDOMEN: Nondistended    EXTREMITIES: Left thigh edema .  Edema at the left thigh appears similar..  Also has some edema in the left lower leg as well as right lower leg      No myoclonus.  NEUROLOGIC:    Distal strength unchanged bilateral lower extremities      Scheduled Meds:aspirin, 81 mg, Oral, Daily  b complex-vitamin c-folic acid, 1 tablet, Oral, Daily  carvedilol, 25 mg, Oral, BID With Meals  cloNIDine, 0.2 mg, Oral, Q12H  gabapentin, 100 mg, Oral, Q24H  gabapentin, 300 mg, Oral, BID  hydrALAZINE, 100 mg, Oral, Q8H  insulin glargine, 10 Units, Subcutaneous, QAM  insulin lispro, 0-7 Units, Subcutaneous, TID AC  levothyroxine, 200 mcg, Oral, Q AM  lidocaine, 1 patch, Transdermal, Q24H  losartan, 100 mg, Oral, Q24H  miconazole, 1 application, Topical, Q12H  pantoprazole, 40 mg, Oral, Q AM  rOPINIRole, 0.75 mg, Oral, Nightly  sertraline, 150 mg, Oral, Daily  sodium zirconium cyclosilicate, 5 g, Oral, Daily  tamsulosin, 0.4 mg, Oral, Daily      Continuous Infusions:   PRN Meds:.•  acetaminophen  •  dextrose  •  dextrose  •  diphenoxylate-atropine  •  glucagon (human recombinant)  •  heparin (porcine)  •  hydrALAZINE  •  nitroglycerin  •  oxyCODONE  •  oxyCODONE  •  sodium chloride  •  zolpidem    Glucose   Date/Time Value Ref Range Status   07/15/2022 1432 65 (L)  70 - 130 mg/dL Final     Comment:     Meter: [id number] : [id number] Anup Martin    07/15/2022 0618 177 (H) 70 - 130 mg/dL Final     Comment:     Meter: [id number] : [id number] León Young    07/15/2022 0156 171 (H) 70 - 130 mg/dL Final     Comment:     Meter: [id number] : [id number] Frank Dunn RN   07/14/2022 2127 186 (H) 70 - 130 mg/dL Final     Comment:     Meter: [id number] : [id number] [facility]   07/14/2022 1606 190 (H) 70 - 130 mg/dL Final     Comment:     Meter: [id number] : [id number] Cricket ELISSALedy Harley    07/14/2022 1122 225 (H) 70 - 130 mg/dL Final     Comment:     Meter: [id number] : [id number] Cricket ELISSAHongShakira Cricket    07/14/2022 0625 138 (H) 70 - 130 mg/dL Final     Comment:     Meter: [id number] : [id number] León [facility]   07/14/2022 0158 165 (H) 70 - 130 mg/dL Final     Comment:     Meter: [id number] : [id number] Frank Dunn RN     Results from last 7 days   Lab Units 07/12/22  1052 07/11/22  2325 07/10/22  0503   WBC 10*3/mm3 6.34 7.02 8.20   HEMOGLOBIN g/dL 8.0* 8.3* 7.9*   HEMATOCRIT % 25.2* 25.9* 24.9*   PLATELETS 10*3/mm3 149 156 170     Results from last 7 days   Lab Units 07/13/22  1053 07/12/22  1052 07/11/22  2325 07/10/22  0503   SODIUM mmol/L 129*  --  133* 133*   POTASSIUM mmol/L 4.6  --  4.2 4.4   CHLORIDE mmol/L 94*  --  99 96*   CO2 mmol/L 23.0  --  24.0 24.0   BUN mg/dL 35*  --  22* 34*   CREATININE mg/dL 3.63*  --  2.31* 3.17*   CALCIUM mg/dL 7.7*  --  8.3* 7.8*   BILIRUBIN mg/dL  --  0.5 0.5 0.4   ALK PHOS U/L  --  551* 571* 572*   ALT (SGPT) U/L  --  18 18 18   AST (SGOT) U/L  --  31 34* 32   GLUCOSE mg/dL 304*  --  202* 156*      Latest Reference Range & Units 05/27/22 11:30   Creatine Kinase 20 - 180 U/L 94   Aldolase 3.3 - 10.3 U/L 5.6       Imaging Results (Last 24 Hours)     ** No results found for the last 24 hours. **          ASSESSMENT AND PLAN    # R MCA s/p TPA with subsequent ICH with debility  Initially held  antiplatelet/anticoagulation.  Reviewed with neurology on May 20 and resume aspirin 81 mg daily  SBP goal <160  Recommend outpatient Neurology fu - repeat MRI in 3 months        # DM  June 27-hyperglycemia on steroids.  Lantus increased to 30 units twice daily, Humalog 5 units 3 times daily with meals, and all increase sliding scale coverage  June 28 -hypoglycemic after increasing insulin dosing.  Blood glucose up to 98 prior to lunch, will hold sliding scale insulin and give 5 units scheduled with meal  June 30-hypoglycemia-Lantus twice daily decreased from 35 to 20 units.  Scheduled Humalog with meals discontinued  July 1-prednisone has been discontinued.  Reviewed with internal medicine and Lantus decreased to 10 units twice daily and on discharge home to resume her home regimen of Lantus 10 units daily.  She will check her sugar tonight at home and if elevated give Lantus 10 units tonight and then continue with the Lantus 10 units daily tomorrow.  Reviewed with patient and her  that her sugars may be elevated initially as she just completed prednisone.  They were instructed to call for any additional instructions on adjustment in regimen if it remains elevated at home this weekend  July 6-Lantus 7 units daily  July 7-blood sugar range 513-364- yesterday,  this AM  July 15-Lantus increased to 10 units daily     # ESRD   Nephrology following  Inpatient HD    Flomax  Hyperkalemia  July 5-patient had hemodialysis on July 3 and July 4 for hyperkalemia.  Lokelma resumed by nephrology  July 7-had hemodialysis yesterday and has plans for hemodialysis again today  July 8-hemodialysis again today     Infectious disease-   # s/p catheter infection with MRSA  Vancomycin IV with stop date of May 26  May 24-vancomycin random equal 12.90-on vancomycin 500 mg IV on Gcjyzlc-Driqkshs-Oiaygqcm  May 26-to complete course of vancomycin today  #Urinary tract infection-on Dana 3 urinalysis was negative for infection  but noted to have leukocytosis increase and repeat urinalysis on June 6 shows findings consistent with urinary tract infection.  Placed on a course of cefdinir renal dose 3 mg daily for 7 days.  No costophrenic angle tenderness patient reviewed with infectious disease service.  Leukocytosis felt related to the bladder infection and not previous catheter infection.  June 8-urine culture results pending.  On cefdinir.  Culture with E. coli, sensitivity pending  June 9 - E coli sensitive to cephalosporins.  June 21 - continues with leukocytosis WBC  15K today. May be from inflammatory process. Steroids added yesterday.   June 22-urine described as milky characteristic, different from previous-recheck urinalysis with culture if indicated  June 23-Labs are still pending today.  Urinalysis also pending as she does not make much urine.  She had a temperature of 100.2 last evening, afebrile this morning.  June 27 - abnormal UA but urine culture from June 24 no growth  July 1-leukocytosis felt related to the noninfectious process in the left thigh as well as secondary to steroids  July 5-WBC trending down to 15.6 today  July 6-WBC trending down to 12 K  July 7-WBC 8.4     #left hydroureteronephrosis-Dana 3-CT scan shows left hydro ureter nephrosis.  She had some previous dilation of the ureter on comparison the past studies but increased today.  Bladder noted to be markedly distended.  Will do in and out cath now and daily to decompress bladder.  Addendum-intermittent cath for 600 cc.  June 4-once daily intermittent cath 450 cc.  June 5-seen by urology-Nguyễn catheter placed with plans to recheck hydroureter on renal ultrasound in 3 to 5 days.  June 6-Nguyễn catheter placed yesterday by urology, with only 170 cc out so far.  Patient reports did not note any change in her left groin pain after the in and out cath with decompression of the bladder..  June 8-reviewed with urology service-request noncontrast CT scan stone protocol  to evaluate for resolution of the left hydronephrosis with urology planning to see tomorrow to review the films and then make recommendations, urology would recommend leaving the Nguyễn catheter in for the time being.  June 9 - improved left hydroureter on CT , Nguyễn discontinued.   July 1-to follow-up with urology as an outpatient with follow-up CT planned in August.  She occasionally required intermittent straight cath but this was very infrequent.  Home health nursing to follow.  Continues on Flomax  July 7-intermittent straight cath 400 cc  July 11-She continues with hydroureteronephrosis on the left side on follow-up CT scan-intermittent straight cath volumes have not been over about 400 cc over the past month.  Urology re-consulted for updated assessment  July 12-plan is intermittent straight cath routinely once or twice a day  July 14 - ISC for 150 cc         #Anemia-  EPO.  June 6-hemoglobin 7.8  July 12-hemoglobin 8.0     Lymphadenopathy-evaluated by hematology oncology while here.  No significant change from scans earlier this year.  She is to repeat a scan in 3 months and follow-up with hematology oncology  July 1181-kvpnfh-ob CT of the abdomen pelvis shows lymphadenopathy overall about the same per her report.  To have follow-up with hematology oncology and repeat scan in another 2 months or so     #Elevated alkaline phosphatase  June 6-elevated alkaline phosphatase 770, up from 289 one week ago, 140 one month ago. Similar elevation in March, Feb even higher at 1,330 ( intra-abdominal fluid collection, underwent CT-guided aspiration  Revealed blood and cultures did not reveal any growth and therefore antibiotics  discontinued.  Surgery also did evaluate and signed off.), and elevated during admission in January ( She was seen by general surgery who recommended and performed laparoscopic cholecystectomy during that admission).   ALT 70, AST 87, Total bilirubin 0.8. Ca 8.3.  ? If liver source or bone or due  to an inflammatory response.  June 7-GGT also elevated.  Seen by gastroenterology.  Douglas biliary source.  Liver ultrasound ordered  June 8-liver ultrasound was unremarkable  June 21 - patient declined liver biopsy.   June 27-gastroenterology following peripherally-plans to follow-up as an outpatient and review option of liver biopsy again if alkaline phosphatase remains elevated  June 28-remains elevated at 503  June 30-alkaline phosphatase lower at 426  July 6-trend back up to 634  July 8-alkaline phosphatase unchanged 627  July 12-alkaline phosphatase 551     #Pain - neuropathy BL lower extremity/left thigh pain  Gabapentin/oxycodone.    June 7-patient with lethargy this morning.  May be due to urinary tract infection but with recent increase and gabapentin and oxycodone, will change gabapentin to 200 mg twice a day and decrease oxycodone from 5 back down to 2.5 mg p.o. every 4 hours as needed  June 8-better speed with her processing today  June 21 - pain med regimen recently adjusted with tramadol 25 mg q 4 hours prn, gabapentin 300 g bid, lidoderm patch, oxycodone 2.5 mg q 6 hours prn. Prednisone 60 mg daily added which may help with pain from inflammatory process. Reports pain better today and participated better in therapies.   June 27 - continue present regimen  July 1-home on oxycodone 5 mg p.o. every 4 hours.  Pain dispense #40, gabapentin 300 mg twice daily, Tylenol 500 mg every 6 hours as needed  July 5-increase gabapentin slightly 300-100-300 mg.  Watch for any myoclonic twitching  July 8-had some lethargy earlier today, patient feels that she is tolerating oxycodone and gabapentin.  Was alert when seen on rounds.        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  Seen by infectious disease service with no acute infectious process noted.  Evaluated by hematology regarding lymphadenopathy, lymph node felt too small to biopsy.  Patient was on a course of low-dose prednisone 10 mg for 3 days then 5 mg for 2 days and then another 10 mg dose with out any significant improvement in the swelling or pain.  She has a history of rheumatoid arthritis.  See CT of the left thigh and MRI of the left thigh and pelvis reports   With lumbar radiculoplexitis and diabetic amyotrophy, on review of the literature do not see edema that she presents with associated with the findings or MRI changes in the tendon and musculature with edema.  There is descriptions of MRI changes in the plexus and peripheral nerve.  In terms of treatment options for diabetic amyotrophy , there have not been definitive clinical trials.  Immunomodulation with steroids has been inconclusive as well as other immunomodulation therapy.  Reviewed with the patient  that  feel that she would benefit from seeing her rheumatologist as an outpatient to evaluate this further, as there does appear to be inflammatory cause given the lymphadenopathy and edema.  Rheumatologist does not come to the hospital.  CPK x2 has been normal.  Aldolase has been normal.  May need to look at biopsy of left thigh muscle to assess further.   June 8-patient reviewed with neurology yesterday who will assess and also provide input regarding whether muscle biopsy may be helpful.  June 9 - Discussed with Neurology and Rheumatology - plan is for EMG and then muscle biopsy.   Dana 15 - Left quadricep muscle biopsy was completed 6/15, results pending.  Labs: CKs have been normal, ANCA panel 6/11 was unremarkable  June 21 - started on full treatment dose of Prednisone 60 mg  daily yesterday pending path results. Reports pain better so far today. Specimen sent to [facility]. Clinical impression presently focal inflammatory myositis pending final pathology results.   June 22-pain continues better today.  June 23-outside pathology report still pending  June 24 - Patient reports left thigh pain better over past couple days but still present. Does not have the soreness at medial thigh as before. Complains of pain at mid-thigh. No change in swelling. Does have discomfort with proximal movement in LLE. Walked 320 feet CTG SBA RW today.   Pain improved on steroid. Discontinued atorvastatin. Biopsy results returned from [facility] - see report -Type 2 fiber atrophy, advanced. Denervation/reinnervation. No evidence of inflammatory myopathy or vasculitis. [facility] lab pursuing a dystrophy panel and will report findings in an addendum.  Reviewed with Neurology - as shown symptomatic response, continue Prednisone 60 mg daily for about one more week, then taper off over month.   June 27 - continue present regimen  June 28-increased pain medication regimen to oxycodone 5 mg every 4 hours as needed for severe pain.   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 Treatment would be aspirin which she is already on. Discussed with Neurology and as been on Prednisone 60 mg for only 1.5 week, will stop and not do taper. Discussed with Internal Medicine who will adjust insulin.  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  Reviewed with the patient that treatment for this is aspirin which she is already on.  She may do walking activities but would avoid strenuous activities with left quadricep strengthening.  She may strengthen the other 3 extremities as tolerated.  Reviewed may take 3 months to resolve.  July  7-continues with left thigh pain.  She is noted to have increased edema in both lower extremities but particularly the left thigh compared to recent.  Lokelma has been associated with fluid retention which may explain increase edema in part.  Continues on aspirin for suspected diabetic muscular infarct.  No update report yet on additional studies for muscle biopsy.  Initial report was June 24 with additional studies planned  July 11-updated MRI imaging of the left thigh this past weekend.  See report.  She has appointment with rheumatology for further assessment later this week. -will recheck CK level for updated baseline as about 4 weeks out now from her biopsy which could have affected level.  Previous CK levels were not elevated.  White blood cell count has normalized which would hold against any active infection.  Edema at the left thigh appears about the same.  Continues with pain on the left side with pain inhibition with proximal muscles.  July 12-CPK was 39 yesterday.  Patient reviewed with pathologist at [facility] today-no additional results yet-they are to call when have additional results  July 15-appointment with rheumatology this afternoon.  Patient reviewed with rheumatologist earlier today.  No additional records update yet on additional studies muscle biopsy    # HTN   Coreg  Clonidine  Hydralazine  Losartan  Nephrology/internal medicine following     #Hypothyroidism  Levothyroxine  June 9 - recheck TSH- addendum Dana 10- On Nov 30, 2021 , on Dec 2, 2021 T4 = 0.90, on Dec 9, T4=1.68. On May 7, TSH = 134. Has been on Levothyroxine 125 mcg/day it appears since at least Dec 13, 2021. See Dr Templeton's discharge note from Dec 17,2021 which discusses thyroid labs/dosing at that time. On June 9, 2022 TSH = 54.4.   Check free T4. Will get evaluation from Internal Medicine regarding thyroid studies/levothyroxine dosing.  June 11 - levothyroxine increased to 150mcg daily-continue this dose and  she will need follow up TSH in 4-6 weeks from June 11 as an outpatient  July 11-repeat TSH and T4 ordered for Monday, July 11 July 12-TSH still elevated, higher at 96.  Levothyroxine increased to 200 mcg daily and separate from other meds.  Free T4 equal 0.68     #CAD  ASA held in setting of ICH - restart aspirin 81 mg daily on May 20, 2022 per Neurology     #Depression   Sertraline  June 28-sertraline dose increased  July 5-reviewed with psychiatry service-continue present regimen    Insomnia-improved on Ambien 5 mg  July 6-fatigue during the day-decrease Ambien 2.5 mg nightly and assess response  July 7-tolerated Ambien 2.5    #GERD   PPI     #Restless leg syndrome  Requip     #DVT prophylaxis-SCDs ordered but patient refusing.  Per neurology note May 11-continue off anticoagulation/antiplatelet for now. Restarted ASA 81 mg on May 20.  May 16-reviewed with patient and try venous foot compression devices  May 20-also not able to tolerate venous foot compression devices.  May 25-bilateral lower extremity venous duplex negative for DVT  July 9-  venous duplex negative for DVT left lower extremity    Diarrhea-July 7-loose stool x5 over the last day.  Lokelma is not associated with diarrhea.  C. difficile was negative on July 5.  Was on cefdinir from June 7 to June 13 and cefazolin one-time dose on Dana 15.  - will recheck C. Difficile  July 11-C. difficile was negative x2.  Diarrhea resolved        -  comprehensive inpatient rehabilitation program working with PT, OT, SLP for a minimum of three hours per day, five days per week. - will monitor for progress     TEAM CONF - MAY 17- BED SBA. TRANSFER MIN. 4 STAIRS MIN. GAIT 160 FEET CTG RW. SLOW TO PROCESS. BATH MIN. LBD MIN. UBD MIN. GROOMING SET UP. TOILETING MIN . COGNITION OVERALL MILD DEFICITS. VISUAL IMPAIRMENT IMPACTS SOME TESTING. ESRD-HD. ANTIBIOTICS - VANCOMYCIN 10.90 YESTERDAY.  ELOS - 1-2 WEEKS.      TEAM CONF - MAY 24 - ALWAYS SO PROCESSING AFTER EACH  DIALYSIS SESSION. AT HOME WOULD GO BACK TO HOME AND SLEEP. BED MIN ASSIST. TRANSFERS MIN. GAIT 80   FEET RW CTG. 4 STAIRS CTG. TOILET TRASNFERS AND SHOWER TRANSFERS MIN CTG. BATH CTG. LBD CTG. UBD SET UP. GROOMING SET UP. TOILETING CTG.   LEFT GROIN - ADDUCTOR PAIN - There is mild joint space narrowing involving both hips symmetrically. There are also some minimal degenerative changes involving both hips. The overall appearance is similar to the study of 12/16/2021. MODERATE WORD RETRIEVAL DEFICITS. IMPAIRED VISION AFFECTS HOME MANAGEMENT TASKS. HYPOGLLYCEMIA AFTER SSI .DECREASED TO 0-7 UNITS.   ELOS - ONE WEEK.         TEAM CONF - MAY 31 - TRANSFERS CTG. GAIT 40- FEET CTG RW LIMITED BY THIGH PAIN. TOILET TRANSFERS CTG. BATH CTG. LBD CTG. UBD SET UP. TOILETING CTG.  GROOMING SET UP. COGNITION - MODERATE WORD RETRIEVAL DEFICITS, MODERATE IMPAIRED MEMORY IMPACTED BY FATIGUE, STILL SLOW PROCESSING.  BNE (Active)  Att'n. - Mildly Imp.  Exec. Fx. - Mildly Imp., slowed processing speed  Rsng/Jgmnt - WNL  Arith - Mod Imp.  Visuospatial Skills - DNA, pt declined citing poor vision  Visual Mem. DNA  Verbal Mem. - WNL  Emot - Pt endorsed mild dysphoria and anxiousness secodnary to current inpatient  Stay.  CONTINENT BOWEL. OCCASIONAL VOID. ESRD-HD. ON INSULIN. SKIN INTACT. LIDODERM PATCH TO LEFT ADDUCTOR TENDON. COURSE OF STEROID FOR LEFT THIGH JEREMI. CONSULTED ORTHO FOR INPUT.  ELOS - POSSIBLY Thursday DEPENDING ON FURTHER EVALUATIONS.            TEAM CONF - JUNE 21 -  DECLINED THERAPY DUE TO PAIN.  AT MOST RECENT THERAPY WHEN PARTICIPATED, TRANSFERS MIN. GAIT 80 FEET MIN / CTG MIN ASSIST RW. TOILET TRANSFERS CTG. TOILETING CTG. MODERATE IMPAIRED VERBAL MEMORY. PAIN MANAGEMENT - MEDS ADJUSTED - OXYCODONE 2.5 MG Q 6 HOURS PRN, TRAMADOL 25 MG Q 4 HOURS. MUSCLE BIOPSY RESULTS PENDING. STARTING ON PREDNISONE 60 MG DAILY YESTERDAY. WILL NEED TO ADJUST INSULING, BLOOD GLUCOSE > 300 THIS AM. GASTROENTEROLOGY EVALUATING  LIVER. PATIENT DECLINES LIVER BIOPSY.  HYPOTHYROID - levothyroxine increased to 150mcg daily-continue this dose and she will need follow up TSH in 4-6 weeks from June 11 as an outpatient  ELOS -   1.5 WEEKS     TEAM CONF - June 28 - BED MIN  SIT TO SUPINE, SBA SUPINE TO SIT. CAR TRANSFERS CTG. TRANSFERS MIN ASSIST. 4 STAIRS MIN. GAIT 240 FEET CTG SBA. TOILET TRANSFERS MIN. UBB SBA. LBB MIN WITH LLE. UBD SET UP. LBD MOD ASSIST LLE.   INSULIN ADJUSTED FOR ELEVATED BLOOD GLUCOSE ON STEROIDS. ON PREDNISONE 60 MG DAILY AND PLAN TO TAPER OVER NEXT MONTH . BIOPSY REPORT SENT TO OUTPATIENT RHEUMATOLOGIST YESTERDAY. ADDITIONAL STUDIES ON BIOPSY PENDING.   BLADDER SCAN 400 CC BUT ONLY 200 CC ON INTERMITTENT STRAIGHT CATH. LEFT QUAD MUSCLE BIOPSY SITE HEALING.  ESRD - HD.   ELOS - Thursday WITH HOME HEALTH PT, OT, AND NURSING FOR DIABETES AND MONITORING ON STEROIDS.   Addendum-was not able to do car transfer after hemodialysis on Friday, July 1 and continued with inpatient rehab course.    TEAM CONF - July 12 - BED MOD. TRASNFER MIN, AT TIMES MIN-MOD. GAIT CTG RW 60 FEET. LAST WORKED ON STAIRS July 2 4 STAIRS MIN ASSIST. TOILET TRANSFERS MIN. UBB SBA. LBB MIN MOD. UBD SET UP. LBD MOD MAX. OXYCODONE AND GABAPENTIN FOR PAIN. LEFT HYDROURETERNEPHROSIS. - CATHETERIZE ONCE A DAY. ON FLOMAX. HAS A RASH ON BACK/BUTTOCK - MICONAZOLE CREAM. APPOINTMENT WITH DR Cortez - RHEUMATOLOGY - ON Friday July 15. CK = 39 YESTERDAY. THYROID RECHECK - TSH 96.1 AND FREE T4 0.68 YESTERDAY - INTERNAL MEDICINE FOLLOWING.   ELOS - LOOKING AT SUBACUTE OPTIONS.     Rehabilitation-July 7-Patient has not been able to participate in therapy for 3 hours a day.  Will ask internal medicine to assess for transfer to the acute care wing in the hospital  July 8-Was evaluated by internal medicine service who did not feel that she needed transfer to the acute care wing of the hospit    Adonis Florentino MD       During rounds, used appropriate personal protective  equipment including mask and gloves.  Additional gown if indicated.  Mask used was standard procedure mask. Appropriate PPE was worn during the entire visit.  Hand hygiene was completed before and after.

## 2022-07-15 NOTE — PROGRESS NOTES
Inpatient Rehabilitation Plan of Care Note    Plan of Care  Care Plan Reviewed - No updates at this time.    Body Function Structure    [RN] Skin Integrity(Active)  Current Status(07/15/2022): Rash to back and lower buttocks.  Weekly Goal(07/21/2022): Rash to resolve.  Discharge Goal: No skin problem.    Performed Intervention(s)  Ointment per MD order      Body Systems    [RN] Genitourinary(Active)  Current Status(07/15/2022): Patient is a M,W,F HD patient with a tunneled  catheter to the R chest. Pt produces little urine.  pt oliguric. New cath order  6/20: straight cath daily and prn until cath residual < 300 ml x3 days.  Weekly Goal(07/19/2022): Pt will plan to transition to community dialysis.  Discharge Goal: Pt will transition to community dialysis.    [RN] Endocrine(Active)  Current Status(07/15/2022): Patient is a diabetic.  Weekly Goal(07/19/2022): Blood glucose will be controlled.  Discharge Goal: Patient will have medication regimen that she can manage at  home.    Performed Intervention(s)  Dialysis mwf  Monitor weight and I/O.  DM educator consult.  Accuchecks as ordered.      Pain    [RN] Pain Management(Active)  Current Status(07/15/2022): Pt has consistent pain to L groin/L medial upper  thigh . Muscle biopsy site to L thigh .  Weekly Goal(07/19/2022): Pain will be controlled.  Discharge Goal: Pt will have pain management regimen that she can follow at  home.    Performed Intervention(s)  Pain meds prn .  Ice to LLE per order      Psychosocial    [RN] Coping/Adjustment(Active)  Current Status(07/15/2022): Pt is A/Ox4. Pt is at increased risk for reduced  coping r/t hospitalization and comorbidities. Pt has been calling for assistance  appropriately as needed. Pt has supportive family.  Weekly Goal(07/19/2022): Pt will verbalize needs, feelings, concerns to staff as  needed.  Discharge Goal: Pt will verbalize adequate coping strategies to use at home.    Performed Intervention(s)  Provide distraction  and/or relaxation as needed.  Allow extra time for patient to verbalize needs, concerns, feelings, etc.      Safety    [RN] Potential for Injury(Active)  Current Status(07/15/2022): Patient is at increased risk for falls/injury r/t  hospitalization and generalized weakness.  Weekly Goal(07/19/2022): Patient will call appropriately for assistance as  needed.  Discharge Goal: Patient will remain free from falls/injury.    Performed Intervention(s)  Fall precautions: bed low and locked, chair alarm and bed alarms in use, nonskid  socks and gait belt in use, call light and personal belongings within reach.  Hourly rounding.      Sphincter Control    [RN] Bowel and bladder (Active)  Current Status(07/15/2022): Patient can be continent and is often incontinent of  stool. Patient is oliguric and is an HD patient. New cath order 6/20.  Weekly Goal(07/19/2022): Pt will be continent 75% of the time. Patient will have  BM at least q3 days or within normal patterns.  Discharge Goal: Patient will be continent 100% of the time.    Performed Intervention(s)  Monitor I/O.  Bowel meds prn.    Signed by: Mona Marie RN

## 2022-07-15 NOTE — PROGRESS NOTES
Name: Zaina Martinez ADMIT: 2022   : 1965  PCP: Karson Oreilly MD    MRN: [id number] LOS: 63 days   AGE/SEX: 56 y.o. female  ROOM: [facility][note 3]     Subjective   Subjective   Seen after dialysis, sleeping. Noted BG down to 64 this afternoon. No other new issues    Review of Systems   Unable to perform ROS: Other        Objective   Objective   Vital Signs  Temp:  [98 °F (36.7 °C)-98.2 °F (36.8 °C)] 98.1 °F (36.7 °C)  Heart Rate:  [50-58] 55  Resp:  [16-18] 16  BP: (106-179)/(67-79) 173/73  SpO2:  [94 %-98 %] 95 %  on   ;   Device (Oxygen Therapy): room air  Body mass index is 28.16 kg/m².  Physical Exam  Vitals and nursing note reviewed.   Constitutional:       General: She is sleeping. She is not in acute distress.  HENT:      Head: Normocephalic.   Eyes:      General: Lids are normal.   Cardiovascular:      Rate and Rhythm: Normal rate.   Pulmonary:      Effort: Pulmonary effort is normal. No respiratory distress.   Abdominal:      Palpations: Abdomen is soft.   Musculoskeletal:      Cervical back: Neck supple.      Right lower leg: No edema.      Left lower leg: Edema present.   Skin:     General: Skin is warm and dry.   Neurological:      Comments: Sleeping         Results Review     I reviewed the patient's new clinical results.  Results from last 7 days   Lab Units 22  1052 07/11/22  2325 07/10/22  0503 07/09/22  0711   WBC 10*3/mm3 6.34 7.02 8.20 7.81   HEMOGLOBIN g/dL 8.0* 8.3* 7.9* 8.1*   PLATELETS 10*3/mm3 149 156 170 173     Results from last 7 days   Lab Units 22  1053 07/11/22  2325 07/10/22  0503 22  0710   SODIUM mmol/L 129* 133* 133* 131*   POTASSIUM mmol/L 4.6 4.2 4.4 3.8   CHLORIDE mmol/L 94* 99 96* 96*   CO2 mmol/L 23.0 24.0 24.0 24.6   BUN mg/dL 35* 22* 34* 22*   CREATININE mg/dL 3.63* 2.31* 3.17* 2.17*   GLUCOSE mg/dL 304* 202* 156* 167*   EGFR mL/min/1.73 14.1* 24.3* 16.6* 26.2*     Results from last 7 days   Lab Units 22  1052 22  2325 07/10/22  0500  07/09/22  0710   ALBUMIN g/dL 2.90* 3.10* 2.80* 2.90*   BILIRUBIN mg/dL 0.5 0.5 0.4 0.5   ALK PHOS U/L 551* 571* 572* 597*   AST (SGOT) U/L 31 34* 32 35*   ALT (SGPT) U/L 18 18 18 21     Results from last 7 days   Lab Units 07/13/22  1053 07/12/22  1052 07/11/22  2325 07/10/22  0503 07/09/22  0710   CALCIUM mg/dL 7.7*  --  8.3* 7.8* 7.6*   ALBUMIN g/dL  --  2.90* 3.10* 2.80* 2.90*   PHOSPHORUS mg/dL 4.3  --  3.0 3.9 3.1       Glucose   Date/Time Value Ref Range Status   07/15/2022 1507 119 70 - 130 mg/dL Final     Comment:     Meter: [id number] : [id number] Hermilo Crystal RN   07/15/2022 1447 64 (L) 70 - 130 mg/dL Final     Comment:     Treated Patient RN Notified R and V Meter: [id number] : [id number] Hermilo Becker   07/15/2022 1432 65 (L) 70 - 130 mg/dL Final     Comment:     Meter: [id number] : [id number] Anup SAAVEDRA   07/15/2022 0618 177 (H) 70 - 130 mg/dL Final     Comment:     Meter: [id number] : [id number] León Bowen QUENTIN   07/15/2022 0156 171 (H) 70 - 130 mg/dL Final     Comment:     Meter: [id number] : [id number] Frank Dunn RN   07/14/2022 2127 186 (H) 70 - 130 mg/dL Final     Comment:     Meter: [id number] : [id number] [facility]   07/14/2022 1606 190 (H) 70 - 130 mg/dL Final     Comment:     Meter: [id number] : [id number] Cricket SAAVEDRA       No radiology results for the last day  Scheduled Medications  aspirin, 81 mg, Oral, Daily  b complex-vitamin c-folic acid, 1 tablet, Oral, Daily  carvedilol, 25 mg, Oral, BID With Meals  cloNIDine, 0.2 mg, Oral, Q12H  gabapentin, 100 mg, Oral, Q24H  gabapentin, 300 mg, Oral, BID  hydrALAZINE, 100 mg, Oral, Q8H  insulin glargine, 10 Units, Subcutaneous, QAM  insulin lispro, 0-7 Units, Subcutaneous, TID AC  levothyroxine, 200 mcg, Oral, Q AM  lidocaine, 1 patch, Transdermal, Q24H  losartan, 100 mg, Oral, Q24H  miconazole, 1 application, Topical, Q12H  pantoprazole, 40 mg, Oral, Q AM  rOPINIRole, 0.75 mg, Oral,  Nightly  sertraline, 150 mg, Oral, Daily  sodium zirconium cyclosilicate, 5 g, Oral, Daily  tamsulosin, 0.4 mg, Oral, Daily    Infusions   Diet  Diet Regular; Thin; Consistent Carbohydrate, Low Potassium, Renal, Daily Fluid Restriction; Other; 1,200; 2 gm (50 mEq)       Assessment/Plan     Active Hospital Problems    Diagnosis  POA   • **Stroke (HCC) [I63.9]  Yes   • 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       56 y.o. female admitted with Stroke (HCC).    -CVA: Continue rehab  -Diabetic muscle infarction: Pain control. Continue ASA. No new findings on workup. Needs PET scan as outpatient & continued Rheumatology follow up  -Chronic diastolic CHF: Intermittent JACKSON. Echo 4/22 EF 66%. Daily fluid restriction  -ESRD/Hyponatremia: Nephrology managing  -Hypothyroidism: Repeat TFT's with elevated TSH, low FT4. Levothyroxine increased 7/13. Needs repeat TFT's in 6 weeks  -DM: BG down to 64 this afternoon, likely due to decreased po intake while in dialysis. Will decrease Lantus to 8 units. Continue correctional scale  -No labs drawn today       RIKA William  [facility]  07/15/22  16:08 EDT

## 2022-07-15 NOTE — THERAPY TREATMENT NOTE
Inpatient Rehabilitation - Physical Therapy Treatment Note       [facility]     Patient Name: Zaina Martinez  : 1965  MRN: [id number]    Today's Date: 7/15/2022                    Admit Date: 2022      Visit Dx:     ICD-10-CM ICD-9-CM   1. Generalized weakness  R53.1 780.79   2. Diabetic muscle infarction ([facility])  E11.69 250.80    M62.20 728.89   3. Other insomnia  G47.09 780.52       Patient Active Problem List   Diagnosis   • Renal insufficiency   • Hypertensive disorder   • Hypothyroidism   • Type 2 diabetes mellitus with kidney complication, with long-term current use of insulin ([facility])   • Rheumatoid arthritis ([facility])   • Angioedema   • Esophageal dysmotility   • Anemia   • Medically noncompliant   • Myocardial infarction due to demand ischemia ([facility])   • Enteritis   • PRES (posterior reversible encephalopathy syndrome)   • Urine retention   • Klebsiella infection   • Superficial thrombophlebitis   • Generalized weakness   • ESRD (end stage renal disease) ([facility])   • CAD (coronary artery disease)   • Abnormal urinalysis   • Chronic diastolic CHF (congestive heart failure) ([facility])   • Pyelonephritis   • Calculus of gallbladder with acute on chronic cholecystitis without obstruction   • Pleural effusion on right   • Anemia due to chronic kidney disease, on chronic dialysis ([facility])   • Abnormal findings on diagnostic imaging of other specified body structures   • Acute upper respiratory infection   • Agitation   • Alkaline phosphatase raised   • Casts present in urine   • Cellulitis of toe   • Hip pain   • Community acquired pneumonia   • Depressive disorder   • Diarrhea of presumed infectious origin   • Difficult or painful urination   • Disease due to severe acute respiratory syndrome coronavirus 2 (SARS-CoV-2)   • Dyspnea   • Encounter for follow-up examination after completed treatment for conditions other than malignant neoplasm   • H/O: hypothyroidism   • Hyperlipidemia   • Hypomagnesemia   • Intractable  vomiting with nausea   • Leukocytosis   • Luetscher's syndrome   • Need for influenza vaccination   • Restless legs   • Noncompliance with treatment   • Shoulder pain   • Urinary tract infectious disease   • Metabolic encephalopathy   • Abnormal findings on diagnostic imaging of abdomen   • Status post cholecystectomy   • Hyponatremia   • Leukocytosis   • Acute metabolic encephalopathy   • Encephalopathy, toxic   • Acute CVA (cerebrovascular accident) (HCC)   • Intracranial hemorrhage (HCC)   • Stroke (HCC)   • Abnormal urinalysis   • Diabetic muscle infarction (HCC)   • Other insomnia       Past Medical History:   Diagnosis Date   • Acute CVA (cerebrovascular accident) (HCC) 5/1/2022   • Acute on chronic diastolic CHF (congestive heart failure) (HCC)    • CAD (coronary artery disease) 12/20/2021   • Diabetes (HCC)    • Disease of thyroid gland    • GERD (gastroesophageal reflux disease)    • Hyperlipidemia 11/30/2018   • Hypertension    • Rheumatoid arthritis (HCC)        Past Surgical History:   Procedure Laterality Date   • CHOLECYSTECTOMY WITH INTRAOPERATIVE CHOLANGIOGRAM N/A 1/10/2022    Procedure: Laparoscopic cholecystectomy with intraoperative cholangiogram;  Surgeon: Ramana Raygoza MD;  Location: [facility];  Service: General;  Laterality: N/A;   • EYE SURGERY     • HYSTERECTOMY     • INSERTION HEMODIALYSIS CATHETER N/A 12/6/2021    Procedure: HEMODIALYSIS CATHETER INSERTION;  Surgeon: Keli Salazar MD;  Location: [facility] 18/19;  Service: Vascular;  Laterality: N/A;   • INSERTION HEMODIALYSIS CATHETER N/A 5/3/2022    Procedure: TUNNELED CATHETER PLACEMENT;  Surgeon: Keli Salazar MD;  Location: [facility];  Service: Vascular;  Laterality: N/A;   • MUSCLE BIOPSY Left 6/15/2022    Procedure: Left quadriceps muscle biopsy;  Surgeon: Marques Ma MD;  Location: [facility];  Service: Neurosurgery;  Laterality: Left;       PT ASSESSMENT (last 12 hours)     IRF PT  Evaluation and Treatment     Row Name 07/15/22 1549          PT Time and Intention    Document Type daily treatment  -LB     Mode of Treatment physical therapy  -LB     Patient/Family/Caregiver Comments/Observations Pnt seated in w/c. NAD  -LB     Evaluation/Treatment Not Performed patient unavailable  Pnt off of unit for hours for dialysis.In PM pnt to MD appointment off of hospital campus. Seen for car tsf training w  as they were leaving for MD appointment.  -LB     Row Name 07/15/22 1549          Car Transfer    Type (Car Transfer) sit-stand;stand-sit  -LB     [place] Level (Car Transfer) minimum assist (75% patient effort)  -LB     Assistive Device (Car Transfer) wheelchair  -LB     Row Name 07/15/22 1549          Positioning and Restraints    Post Treatment Position other  -LB     Other Position --  Pnt in car w , leaving for MD appointment.  -LB           User Key  (r) = Recorded By, (t) = Taken By, (c) = Cosigned By    Initials Name Provider Type    LB Liz Rodriguez PTA Physical Therapist Assistant              Rash Left lower thoracic spine (Active)   Distribution localized 07/14/22 2047   Color pink 07/14/22 2047   Configuration/Shape round 07/14/22 2047   Borders irregular 07/14/22 2047   Characteristics dry 07/14/22 2047   Care, Rash open to air;antimicrobial agent applied 07/14/22 2047       Wound 06/15/22 1312 Left anterior thigh Incision (Active)   Dressing Appearance open to air;no drainage 07/14/22 2047   Closure Liquid skin adhesive 07/14/22 2047   Base clean;dry 07/14/22 2047   Drainage Amount none 07/14/22 2047   Dressing Care open to air 07/14/22 2047     Physical Therapy Education                 Title: PT OT SLP Therapies (In Progress)     Topic: Physical Therapy (In Progress)     Point: Mobility training (In Progress)     Learning Progress Summary           Patient Acceptance, E, NR by GUSTAVO at 7/14/2022 1143   Family Acceptance, E,D, VU,NR by LB at 7/13/2022 9284     Comment: Stairs, (dgt practiced). 2 Helpers for stairs used.  Attempted car tsf to dgts car. Unable to perform as the SUV was too high for pnt. A platform step was offered for practice, but pnt declined.  Pnt refused amb during fam teaching, 2ary to L thigh pain.   Significant Other Acceptance, E, VU by LB at 7/15/2022 1556    Comment: car tsf      Show all documentation for this point (40)                 Point: Home exercise program (In Progress)     Learning Progress Summary           Patient Acceptance, E,TB, NR by MS at 7/11/2022 1434      Show all documentation for this point (13)                 Point: Body mechanics (Done)     Learning Progress Summary           Patient Acceptance, E,TB,D, VU,NR by JS at 6/25/2022 1434   Family Acceptance, E, VU by LB at 7/13/2022 1504      Show all documentation for this point (8)                 Point: Precautions (Done)     Learning Progress Summary           Patient Acceptance, E,TB,D, VU,NR by  at 6/25/2022 1434      Show all documentation for this point (8)                             User Key     Initials Effective Dates Name Provider Type Discipline     06/16/21 -  Fany Lima, PT Physical Therapist PT    LB 03/07/18 -  Liz Rodriguez PTA Physical Therapist Assistant PT    MS 06/16/21 -  Teagan Santos PT Physical Therapist PT                PT Recommendation and Plan  Patient was wearing a face mask during this therapy encounter. Therapist used appropriate personal protective equipment including mask and gloves.  Mask used was standard procedure mask. Appropriate PPE was worn during the entire therapy session. Hand hygiene was completed before and after therapy session. Patient is not in enhanced droplet precautions.                           Time Calculation:      PT Charges     Row Name 07/15/22 1542 07/15/22 1500          Time Calculation    Start Time 1520  -LB --     Stop Time 1540  -LB --     Time Calculation (min) 20 min  -LB --     PT - Next  Appointment -- 07/18/22  -DIEGO           User Key  (r) = Recorded By, (t) = Taken By, (c) = Cosigned By    Initials Name Provider Type    Liz Henriquez PTA Physical Therapist Assistant    Tasha Metz, OTR Occupational Therapist                Therapy Charges for Today     Code Description Service Date Service Provider Modifiers Qty    30832771640 HC PT THER PROC EA 15 MIN 7/14/2022 Liz Rodriguez PTA GP 6    19745961348 HC PT THER PROC EA 15 MIN 7/15/2022 Liz Rodriguez PTA GP 1            PT G-Codes  AM-PAC 6 Clicks Score (PT): 17      Liz Rodriguez PTA  7/15/2022

## 2022-07-15 NOTE — PLAN OF CARE
Goal Outcome Evaluation:  Plan of Care Reviewed With: patient           Outcome Evaluation: Zaina had a busy day, dialysis in the morning and doctor appointment in afternoon- drove her rather than using wheelchair van. She is assist x1to 2, alert and oriented x4, but slow to respond. She takes medication with water, pain medication given twice, and accucheck AC/HS. Low blood sugar this afternoon, please call [facility] to adjust insulin if needed Saturday per Dr Florentino. Dialysis M/W/F, labs to be drawn late as patient was off the floor most of the day-either in dailysis or at MD appointment. Lab notified patient had returned and night shift nurse aware labs were needed. BP elevated, swelling to L thigh and no safety issues.

## 2022-07-15 NOTE — NURSING NOTE
Call placed to dialysis unit to order treatment early in am r/t appt at 1600. Dialysis stated he just walked in the door, would check for any critical orders to address first and call back. Awaiting call at this time.

## 2022-07-15 NOTE — PLAN OF CARE
Goal Outcome Evaluation:  Plan of Care Reviewed With: patient             Problem: Rehabilitation ([facility]) Plan of Care  Goal: Plan of Care Review  Recent Flowsheet Documentation  Taken 7/15/2022 0222 by Mona Marie RN  Outcome Evaluation: Zaina is alert and oriented x 4. Can be forgetful at times. Flat affect, very friendly and cooperative. Took meds whole PO with thin liquids. Oliguric, HD MWF. Will attempt to call inpatient dialysis to have pt seen in am r/t to appt at 4pm. Day nurse tried and called multiple times with no answer. Transfers x 1 to w/c. Continues to c/o of burning and discomfort to left thigh. PRN 5mg Vanessa administered and ice applied. No unsafe behaviors. PRN Ambien requested by pt. Resting well with eyes closed. Call light within reach.

## 2022-07-15 NOTE — PROGRESS NOTES
Inpatient Rehabilitation Plan of Care Note    Plan of Care  Care Plan Reviewed - No updates at this time.    Safety    Performed Intervention(s)  Fall precautions: bed low and locked, chair alarm and bed alarms in use, nonskid  socks and gait belt in use, call light and personal belongings within reach.  Hourly rounding.      Psychosocial    Performed Intervention(s)  Provide distraction and/or relaxation as needed.  Allow extra time for patient to verbalize needs, concerns, feelings, etc.      Body Systems    Performed Intervention(s)  Dialysis mwf  Monitor weight and I/O.  DM educator consult.  Accuchecks as ordered.      Sphincter Control    Performed Intervention(s)  Monitor I/O.  Bowel meds prn.      Pain    Performed Intervention(s)  Pain meds prn .  Ice to LLE per order      Body Function Structure    Performed Intervention(s)  Ointment per MD order    Signed by: Marah Akhtar RN

## 2022-07-16 LAB
ALBUMIN SERPL-MCNC: 3 G/DL (ref 3.5–5.2)
ALBUMIN/GLOB SERPL: 1 G/DL
ALP SERPL-CCNC: 451 U/L (ref 39–117)
ALT SERPL W P-5'-P-CCNC: 16 U/L (ref 1–33)
ANION GAP SERPL CALCULATED.3IONS-SCNC: 11 MMOL/L (ref 5–15)
AST SERPL-CCNC: 36 U/L (ref 1–32)
BILIRUB SERPL-MCNC: 0.5 MG/DL (ref 0–1.2)
BUN SERPL-MCNC: 15 MG/DL (ref 6–20)
BUN/CREAT SERPL: 7.4 (ref 7–25)
CALCIUM SPEC-SCNC: 8.2 MG/DL (ref 8.6–10.5)
CHLORIDE SERPL-SCNC: 98 MMOL/L (ref 98–107)
CO2 SERPL-SCNC: 25 MMOL/L (ref 22–29)
CREAT SERPL-MCNC: 2.03 MG/DL (ref 0.57–1)
EGFRCR SERPLBLD CKD-EPI 2021: 28.3 ML/MIN/1.73
GLOBULIN UR ELPH-MCNC: 2.9 GM/DL
GLUCOSE BLDC GLUCOMTR-MCNC: 106 MG/DL (ref 70–130)
GLUCOSE BLDC GLUCOMTR-MCNC: 145 MG/DL (ref 70–130)
GLUCOSE BLDC GLUCOMTR-MCNC: 160 MG/DL (ref 70–130)
GLUCOSE BLDC GLUCOMTR-MCNC: 185 MG/DL (ref 70–130)
GLUCOSE SERPL-MCNC: 131 MG/DL (ref 65–99)
POTASSIUM SERPL-SCNC: 4.2 MMOL/L (ref 3.5–5.2)
PROT SERPL-MCNC: 5.9 G/DL (ref 6–8.5)
SODIUM SERPL-SCNC: 134 MMOL/L (ref 136–145)

## 2022-07-16 PROCEDURE — 63710000001 INSULIN LISPRO (HUMAN) PER 5 UNITS: Performed by: HOSPITALIST

## 2022-07-16 PROCEDURE — 82962 GLUCOSE BLOOD TEST: CPT

## 2022-07-16 PROCEDURE — 63710000001 PREDNISONE PER 5 MG: Performed by: PHYSICAL MEDICINE & REHABILITATION

## 2022-07-16 RX ORDER — PREDNISONE 10 MG/1
5 TABLET ORAL ONCE
Status: COMPLETED | OUTPATIENT
Start: 2022-07-16 | End: 2022-07-16

## 2022-07-16 RX ORDER — PREDNISONE 10 MG/1
5 TABLET ORAL
Status: DISCONTINUED | OUTPATIENT
Start: 2022-07-17 | End: 2022-07-21

## 2022-07-16 RX ADMIN — OXYCODONE 5 MG: 5 TABLET ORAL at 02:59

## 2022-07-16 RX ADMIN — GABAPENTIN 100 MG: 100 CAPSULE ORAL at 14:53

## 2022-07-16 RX ADMIN — CARVEDILOL 25 MG: 25 TABLET, FILM COATED ORAL at 17:00

## 2022-07-16 RX ADMIN — Medication 1 TABLET: at 09:00

## 2022-07-16 RX ADMIN — OXYCODONE 5 MG: 5 TABLET ORAL at 08:49

## 2022-07-16 RX ADMIN — CLONIDINE HYDROCHLORIDE 0.2 MG: 0.1 TABLET ORAL at 09:00

## 2022-07-16 RX ADMIN — ASPIRIN 81 MG: 81 TABLET, COATED ORAL at 09:00

## 2022-07-16 RX ADMIN — OXYCODONE 5 MG: 5 TABLET ORAL at 16:59

## 2022-07-16 RX ADMIN — OXYCODONE 5 MG: 5 TABLET ORAL at 21:56

## 2022-07-16 RX ADMIN — GABAPENTIN 300 MG: 300 CAPSULE ORAL at 21:56

## 2022-07-16 RX ADMIN — INSULIN GLARGINE-YFGN 8 UNITS: 100 INJECTION, SOLUTION SUBCUTANEOUS at 09:00

## 2022-07-16 RX ADMIN — PREDNISONE 5 MG: 10 TABLET ORAL at 17:00

## 2022-07-16 RX ADMIN — MICONAZOLE NITRATE 1 APPLICATION: 2 CREAM TOPICAL at 21:57

## 2022-07-16 RX ADMIN — SERTRALINE 150 MG: 100 TABLET, FILM COATED ORAL at 09:00

## 2022-07-16 RX ADMIN — CLONIDINE HYDROCHLORIDE 0.2 MG: 0.1 TABLET ORAL at 21:57

## 2022-07-16 RX ADMIN — INSULIN LISPRO 2 UNITS: 100 INJECTION, SOLUTION INTRAVENOUS; SUBCUTANEOUS at 17:00

## 2022-07-16 RX ADMIN — HYDRALAZINE HYDROCHLORIDE 100 MG: 50 TABLET, FILM COATED ORAL at 06:32

## 2022-07-16 RX ADMIN — INSULIN LISPRO 2 UNITS: 100 INJECTION, SOLUTION INTRAVENOUS; SUBCUTANEOUS at 11:53

## 2022-07-16 RX ADMIN — ROPINIROLE HYDROCHLORIDE 0.75 MG: 0.5 TABLET, FILM COATED ORAL at 21:56

## 2022-07-16 RX ADMIN — ZOLPIDEM TARTRATE 2.5 MG: 5 TABLET ORAL at 21:57

## 2022-07-16 RX ADMIN — HYDRALAZINE HYDROCHLORIDE 100 MG: 50 TABLET, FILM COATED ORAL at 21:56

## 2022-07-16 RX ADMIN — HYDRALAZINE HYDROCHLORIDE 100 MG: 50 TABLET, FILM COATED ORAL at 14:54

## 2022-07-16 RX ADMIN — PANTOPRAZOLE SODIUM 40 MG: 40 TABLET, DELAYED RELEASE ORAL at 06:32

## 2022-07-16 RX ADMIN — SODIUM ZIRCONIUM CYCLOSILICATE 5 G: 5 POWDER, FOR SUSPENSION ORAL at 09:00

## 2022-07-16 RX ADMIN — TAMSULOSIN HYDROCHLORIDE 0.4 MG: 0.4 CAPSULE ORAL at 09:00

## 2022-07-16 RX ADMIN — MICONAZOLE NITRATE 1 APPLICATION: 2 CREAM TOPICAL at 09:05

## 2022-07-16 RX ADMIN — GABAPENTIN 300 MG: 300 CAPSULE ORAL at 08:59

## 2022-07-16 RX ADMIN — LOSARTAN POTASSIUM 100 MG: 100 TABLET, FILM COATED ORAL at 09:00

## 2022-07-16 RX ADMIN — LEVOTHYROXINE SODIUM 200 MCG: 0.1 TABLET ORAL at 05:58

## 2022-07-16 RX ADMIN — CARVEDILOL 25 MG: 25 TABLET, FILM COATED ORAL at 09:00

## 2022-07-16 NOTE — PLAN OF CARE
Problem: Skin Injury Risk Increased  Goal: Skin Health and Integrity  Outcome: Ongoing, Progressing     Problem: Fall Injury Risk  Goal: Absence of Fall and Fall-Related Injury  Outcome: Ongoing, Progressing   Goal Outcome Evaluation:           Progress: no change  Outcome Evaluation: Pt appeared to sleep well, pain med given per pt request, alert and oriented x4, coop with care, repositioned, cream applied to rash on back, fluid restrictions followed, i/o cath x1 200 cc milky yellow urine obtained.

## 2022-07-16 NOTE — PROGRESS NOTES
[facility] Progress Note      Patient Name: Zaina Martinez  : 1965  MRN: [id number]  Primary Care Physician:  Karson Oreilly MD  Date of admission: 2022    Subjective     Interval History:   Follow up ESRD. .   Labile blood pressure  Underwent intermittent hemodialysis yesterday with 3.5 L of ultrafiltration.    Review of Systems:   Denies chest pain or shortness of breath    Objective     Vitals:   Temp:  [98 °F (36.7 °C)-98.4 °F (36.9 °C)] 98 °F (36.7 °C)  Heart Rate:  [52-59] 59  Resp:  [16-18] 18  BP: (139-190)/(71-81) 139/71    Intake/Output Summary (Last 24 hours) at 2022 1009  Last data filed at 2022 0800  Gross per 24 hour   Intake 320 ml   Output 3700 ml   Net -3380 ml     Seen on dialysis    Physical Exam:   General:  in bed  Conversant.    HEENT: oral mucosa moist.   Neck:RIJ TDC  Lungs:clear to auscultation. Unlabored.    Heart:RRR no s3 or rub. 2 /6 early syst m  Abdomen: + bs,soft, nontender   Extremities:1+ -2+ lower ext edema.    Neuro: speech more fluent . Moves all 4 ext.     Scheduled Meds:     aspirin, 81 mg, Oral, Daily  b complex-vitamin c-folic acid, 1 tablet, Oral, Daily  carvedilol, 25 mg, Oral, BID With Meals  cloNIDine, 0.2 mg, Oral, Q12H  gabapentin, 100 mg, Oral, Q24H  gabapentin, 300 mg, Oral, BID  hydrALAZINE, 100 mg, Oral, Q8H  insulin glargine, 8 Units, Subcutaneous, QAM  insulin lispro, 0-7 Units, Subcutaneous, TID AC  levothyroxine, 200 mcg, Oral, Q AM  lidocaine, 1 patch, Transdermal, Q24H  losartan, 100 mg, Oral, Q24H  miconazole, 1 application, Topical, Q12H  pantoprazole, 40 mg, Oral, Q AM  rOPINIRole, 0.75 mg, Oral, Nightly  sertraline, 150 mg, Oral, Daily  sodium zirconium cyclosilicate, 5 g, Oral, Daily  tamsulosin, 0.4 mg, Oral, Daily      IV Meds:        Results Reviewed:   I have personally reviewed the results from the time of this admission to 2022 10:09 EDT     Results from last 7 days   Lab Units  07/15/22  2207 07/13/22  1053 07/12/22  1052 07/11/22  2325   SODIUM mmol/L 134* 129*  --  133*   POTASSIUM mmol/L 4.2 4.6  --  4.2   CHLORIDE mmol/L 98 94*  --  99   CO2 mmol/L 25.0 23.0  --  24.0   BUN mg/dL 15 35*  --  22*   CREATININE mg/dL 2.03* 3.63*  --  2.31*   CALCIUM mg/dL 8.2* 7.7*  --  8.3*   BILIRUBIN mg/dL 0.5  --  0.5 0.5   ALK PHOS U/L 451*  --  551* 571*   ALT (SGPT) U/L 16  --  18 18   AST (SGOT) U/L 36*  --  31 34*   GLUCOSE mg/dL 131* 304*  --  202*       Estimated Creatinine Clearance: 27.8 mL/min (A) (by C-G formula based on SCr of 2.03 mg/dL (H)).    Results from last 7 days   Lab Units 07/13/22  1053 07/11/22  2325 07/10/22  0503   PHOSPHORUS mg/dL 4.3 3.0 3.9             Results from last 7 days   Lab Units 07/15/22  2207 07/12/22  1052 07/11/22  2325 07/10/22  0503   WBC 10*3/mm3 11.29* 6.34 7.02 8.20   HEMOGLOBIN g/dL 8.5* 8.0* 8.3* 7.9*   PLATELETS 10*3/mm3 187 149 156 170             Assessment / Plan     ASSESSMENT:  1.    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 HD MWF.  2.  Intracerebral bleed and altered mental status.  Rehab .   3.  Infected TDC, s/p removal 5/1. Replaced. Staph aureus. completed vancomycin.  4.  DM2    5.  Coronary artery disease  6.  Acute on chronic diastolic dysfunction . Volume off with HD.   7.  Anemia of CKD, on long-acting ANDRAE as an outpatient. Hg stable yesterday .  On procrit here.  Will stop it until BP better controlled.   8. Hypothyroidism.  TSH still very high and Free t4 low .  LHA increased replacement .  This is certainly contributing to her edema.   9. 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 On ASA.     PLAN:  1.  HD Monday Wednesday Friday  2. MWF labs would be fine with renal .      Didi Mcleod MD  07/16/22  10:09 EDT    [facility]  [phone]

## 2022-07-16 NOTE — PROGRESS NOTES
[facility]     Progress Note    Patient Name: Zaina Martinez  : 1965  MRN: [id number]  Primary Care Physician:  Karson Oreilly MD  Date of admission: 2022    Subjective   Subjective     Chief Complaint: Debility     History of Present Illness  Patient Reports left medial thigh pain is still bothersome. Went to see Rheum yesterday.  Examiner called by nursing reporting patient has outpatient medication filled at pharmacy prescribed by Rheum.  picked up medication and it is Pred 5mg daily.  reportedly told nursing that plan is to take Pred 5mg daily until is discharged and takes another medication by Rheum then.    Review of Systems    Objective   Objective     Vitals:   Temp:  [98 °F (36.7 °C)-99.3 °F (37.4 °C)] 99.3 °F (37.4 °C)  Heart Rate:  [53-59] 53  Resp:  [18] 18  BP: (114-190)/(64-81) 158/76    Physical Exam   Gen: nad, lying comfortably in bed this morning  MENTAL STATUS: Awake alert  HEENT:  NCAT  CARDIOVASCULAR: Sinus rhythm    CHEST:  hemodialysis access right chest  RESPIRATORY: Normal respirations.     ABDOMEN: Nondistended     EXTREMITIES: Left thigh edema . Healed incisional biopsy area.     No myoclonus.          Result Review    Result Review:  I have personally reviewed the results from the time of this admission to 2022 15:05 EDT and agree with these findings:  []  Laboratory list / accordion  []  Microbiology  []  Radiology  []  EKG/Telemetry   []  Cardiology/Vascular   []  Pathology  []  Old records  []  Other:  Most notable findings include:   Latest Reference Range & Units 22 06:25 22 11:05   Glucose 70 - 130 mg/dL 106 185 (H)   (H): Data is abnormally high    Scheduled Meds:aspirin, 81 mg, Oral, Daily  b complex-vitamin c-folic acid, 1 tablet, Oral, Daily  carvedilol, 25 mg, Oral, BID With Meals  cloNIDine, 0.2 mg, Oral, Q12H  gabapentin, 100 mg, Oral, Q24H  gabapentin, 300 mg, Oral, BID  hydrALAZINE, 100 mg, Oral, Q8H  insulin glargine, 8  Units, Subcutaneous, QAM  insulin lispro, 0-7 Units, Subcutaneous, TID AC  levothyroxine, 200 mcg, Oral, Q AM  lidocaine, 1 patch, Transdermal, Q24H  losartan, 100 mg, Oral, Q24H  miconazole, 1 application, Topical, Q12H  pantoprazole, 40 mg, Oral, Q AM  [START ON 7/17/2022] predniSONE, 5 mg, Oral, Daily With Breakfast  rOPINIRole, 0.75 mg, Oral, Nightly  sertraline, 150 mg, Oral, Daily  sodium zirconium cyclosilicate, 5 g, Oral, Daily  tamsulosin, 0.4 mg, Oral, Daily      Continuous Infusions:   PRN Meds:.•  acetaminophen  •  dextrose  •  dextrose  •  diphenoxylate-atropine  •  glucagon (human recombinant)  •  heparin (porcine)  •  hydrALAZINE  •  nitroglycerin  •  oxyCODONE  •  oxyCODONE  •  sodium chloride  •  zolpidem       Assessment & Plan   Assessment / Plan     Brief Patient Summary:  Zaina Martinez is a 56 y.o. female    Active Hospital Problems:  Active Hospital Problems    Diagnosis    • **Stroke ([facility])    • Diabetic muscle infarction (HCC)    • Other insomnia    • Hyponatremia    • Chronic diastolic CHF (congestive heart failure) ([facility])    • ESRD (end stage renal disease) ([facility])    • Hypothyroidism    • Hyperlipidemia    • Type 2 diabetes mellitus with kidney complication, with long-term current use of insulin (HCC)      Plan:   # R MCA s/p TPA with subsequent ICH with debility  Initially held antiplatelet/anticoagulation.  Reviewed with neurology on May 20 and resume aspirin 81 mg daily  SBP goal <160  Recommend outpatient Neurology fu - repeat MRI in 3 months        # DM  June 27-hyperglycemia on steroids.  Lantus increased to 30 units twice daily, Humalog 5 units 3 times daily with meals, and all increase sliding scale coverage  June 28 -hypoglycemic after increasing insulin dosing.  Blood glucose up to 98 prior to lunch, will hold sliding scale insulin and give 5 units scheduled with meal  June 30-hypoglycemia-Lantus twice daily decreased from 35 to 20 units.  Scheduled Humalog with meals  discontinued  July 1-prednisone has been discontinued.  Reviewed with internal medicine and Lantus decreased to 10 units twice daily and on discharge home to resume her home regimen of Lantus 10 units daily.  She will check her sugar tonight at home and if elevated give Lantus 10 units tonight and then continue with the Lantus 10 units daily tomorrow.  Reviewed with patient and her  that her sugars may be elevated initially as she just completed prednisone.  They were instructed to call for any additional instructions on adjustment in regimen if it remains elevated at home this weekend  July 6-Lantus 7 units daily  July 7-blood sugar range 795-860- yesterday,  this AM  July 15-Lantus increased to 10 units daily  July 16 - monitor Glc with resuming Prednisone today     # ESRD   Nephrology following  Inpatient HD    Flomax  Hyperkalemia  July 5-patient had hemodialysis on July 3 and July 4 for hyperkalemia.  Lokelma resumed by nephrology  July 7-had hemodialysis yesterday and has plans for hemodialysis again today  July 8-hemodialysis again today     Infectious disease-   # s/p catheter infection with MRSA  Vancomycin IV with stop date of May 26  May 24-vancomycin random equal 12.90-on vancomycin 500 mg IV on Cektsqi-Ecmykzze-Dvuymirx  May 26-to complete course of vancomycin today  #Urinary tract infection-on Dana 3 urinalysis was negative for infection but noted to have leukocytosis increase and repeat urinalysis on June 6 shows findings consistent with urinary tract infection.  Placed on a course of cefdinir renal dose 3 mg daily for 7 days.  No costophrenic angle tenderness patient reviewed with infectious disease service.  Leukocytosis felt related to the bladder infection and not previous catheter infection.  June 8-urine culture results pending.  On cefdinir.  Culture with E. coli, sensitivity pending  June 9 - E coli sensitive to cephalosporins.  June 21 - continues with leukocytosis WBC  15K  today. May be from inflammatory process. Steroids added yesterday.   June 22-urine described as milky characteristic, different from previous-recheck urinalysis with culture if indicated  June 23-Labs are still pending today.  Urinalysis also pending as she does not make much urine.  She had a temperature of 100.2 last evening, afebrile this morning.  June 27 - abnormal UA but urine culture from June 24 no growth  July 1-leukocytosis felt related to the noninfectious process in the left thigh as well as secondary to steroids  July 5-WBC trending down to 15.6 today  July 6-WBC trending down to 12 K  July 7-WBC 8.4     #left hydroureteronephrosis-Dana 3-CT scan shows left hydro ureter nephrosis.  She had some previous dilation of the ureter on comparison the past studies but increased today.  Bladder noted to be markedly distended.  Will do in and out cath now and daily to decompress bladder.  Addendum-intermittent cath for 600 cc.  June 4-once daily intermittent cath 450 cc.  June 5-seen by urology-Nguyễn catheter placed with plans to recheck hydroureter on renal ultrasound in 3 to 5 days.  June 6-Nguyễn catheter placed yesterday by urology, with only 170 cc out so far.  Patient reports did not note any change in her left groin pain after the in and out cath with decompression of the bladder..  June 8-reviewed with urology service-request noncontrast CT scan stone protocol to evaluate for resolution of the left hydronephrosis with urology planning to see tomorrow to review the films and then make recommendations, urology would recommend leaving the Nguyễn catheter in for the time being.  June 9 - improved left hydroureter on CT , Nguyễn discontinued.   July 1-to follow-up with urology as an outpatient with follow-up CT planned in August.  She occasionally required intermittent straight cath but this was very infrequent.  Home health nursing to follow.  Continues on Flomax  July 7-intermittent straight cath 400 cc  July  11-She continues with hydroureteronephrosis on the left side on follow-up CT scan-intermittent straight cath volumes have not been over about 400 cc over the past month.  Urology re-consulted for updated assessment  July 12-plan is intermittent straight cath routinely once or twice a day  July 14 - ISC for 150 cc           #Anemia-  EPO.  June 6-hemoglobin 7.8  July 12-hemoglobin 8.0     Lymphadenopathy-evaluated by hematology oncology while here.  No significant change from scans earlier this year.  She is to repeat a scan in 3 months and follow-up with hematology oncology  July 1116-[id number] CT of the abdomen pelvis shows lymphadenopathy overall about the same per her report.  To have follow-up with hematology oncology and repeat scan in another 2 months or so     #Elevated alkaline phosphatase  June 6-elevated alkaline phosphatase 770, up from 289 one week ago, 140 one month ago. Similar elevation in March, Feb even higher at 1,330 ( intra-abdominal fluid collection, underwent CT-guided aspiration  Revealed blood and cultures did not reveal any growth and therefore antibiotics  discontinued.  Surgery also did evaluate and signed off.), and elevated during admission in January ( She was seen by general surgery who recommended and performed laparoscopic cholecystectomy during that admission).   ALT 70, AST 87, Total bilirubin 0.8. Ca 8.3.  ? If liver source or bone or due to an inflammatory response.  June 7-GGT also elevated.  Seen by gastroenterology.  [place] biliary source.  Liver ultrasound ordered  June 8-liver ultrasound was unremarkable  June 21 - patient declined liver biopsy.   June 27-gastroenterology following peripherally-plans to follow-up as an outpatient and review option of liver biopsy again if alkaline phosphatase remains elevated  June 28-remains elevated at 503  June 30-alkaline phosphatase lower at 426  July 6-trend back up to 634  July 8-alkaline phosphatase unchanged 627  July 12-alkaline  phosphatase 551     #Pain - neuropathy BL lower extremity/left thigh pain  Gabapentin/oxycodone.    June 7-patient with lethargy this morning.  May be due to urinary tract infection but with recent increase and gabapentin and oxycodone, will change gabapentin to 200 mg twice a day and decrease oxycodone from 5 back down to 2.5 mg p.o. every 4 hours as needed  June 8-better speed with her processing today  June 21 - pain med regimen recently adjusted with tramadol 25 mg q 4 hours prn, gabapentin 300 g bid, lidoderm patch, oxycodone 2.5 mg q 6 hours prn. Prednisone 60 mg daily added which may help with pain from inflammatory process. Reports pain better today and participated better in therapies.   June 27 - continue present regimen  July 1-home on oxycodone 5 mg p.o. every 4 hours.  Pain dispense #40, gabapentin 300 mg twice daily, Tylenol 500 mg every 6 hours as needed  July 5-increase gabapentin slightly 300-100-300 mg.  Watch for any myoclonic twitching  July 8-had some lethargy earlier today, patient feels that she is tolerating oxycodone and gabapentin.  Was alert when seen on rounds.        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  Seen by infectious disease service with no acute infectious process noted.  Evaluated by hematology regarding lymphadenopathy, lymph node felt too small to biopsy.  Patient was on a course of low-dose prednisone 10 mg for 3 days then 5 mg for 2 days and then another 10 mg dose with out any significant improvement in the swelling or pain.  She has a history of rheumatoid  arthritis.  See CT of the left thigh and MRI of the left thigh and pelvis reports   With lumbar radiculoplexitis and diabetic amyotrophy, on review of the literature do not see edema that she presents with associated with the findings or MRI changes in the tendon and musculature with edema.  There is descriptions of MRI changes in the plexus and peripheral nerve.  In terms of treatment options for diabetic amyotrophy , there have not been definitive clinical trials.  Immunomodulation with steroids has been inconclusive as well as other immunomodulation therapy.  Reviewed with the patient  that  feel that she would benefit from seeing her rheumatologist as an outpatient to evaluate this further, as there does appear to be inflammatory cause given the lymphadenopathy and edema.  Rheumatologist does not come to the hospital.  CPK x2 has been normal.  Aldolase has been normal.  May need to look at biopsy of left thigh muscle to assess further.   June 8-patient reviewed with neurology yesterday who will assess and also provide input regarding whether muscle biopsy may be helpful.  June 9 - Discussed with Neurology and Rheumatology - plan is for EMG and then muscle biopsy.   Dana 15 - Left quadricep muscle biopsy was completed 6/15, results pending.  Labs: CKs have been normal, ANCA panel 6/11 was unremarkable  June 21 - started on full treatment dose of Prednisone 60 mg daily yesterday pending path results. Reports pain better so far today. Specimen sent to [facility]. Clinical impression presently focal inflammatory myositis pending final pathology results.   June 22-pain continues better today.  June 23-outside pathology report still pending  June 24 - Patient reports left thigh pain better over past couple days but still present. Does not have the soreness at medial thigh as before. Complains of pain at mid-thigh. No change in swelling. Does have discomfort with proximal movement in LLE. Walked 320 feet  CTG SBA RW today.   Pain improved on steroid. Discontinued atorvastatin. Biopsy results returned from [facility] - see report -Type 2 fiber atrophy, advanced. Denervation/reinnervation. No evidence of inflammatory myopathy or vasculitis. [facility] lab pursuing a dystrophy panel and will report findings in an addendum.  Reviewed with Neurology - as shown symptomatic response, continue Prednisone 60 mg daily for about one more week, then taper off over month.   June 27 - continue present regimen  June 28-increased pain medication regimen to oxycodone 5 mg every 4 hours as needed for severe pain.   July 1- On review with Rheumatology and Neurology, suspicion is for diabetic muscle infarction. Treatment would be aspirin which she is already on. Discussed with Neurology and as been on Prednisone 60 mg for only 1.5 week, will stop and not do taper. Discussed with Internal Medicine who will adjust insulin.  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  Reviewed with the patient that treatment for this is aspirin which she is already on.  She may do walking activities but would avoid strenuous activities with left quadricep strengthening.  She may strengthen the other 3 extremities as tolerated.  Reviewed may take 3 months to resolve.  July 7-continues with left thigh pain.  She is noted to have increased edema in both lower extremities but particularly the left thigh compared to recent.  Lokelma has been associated with fluid retention which may explain increase edema in part.  Continues on aspirin for suspected diabetic muscular infarct.  No update report yet on additional studies for muscle biopsy.  Initial report was June 24 with additional studies planned  July 11-updated MRI imaging of the left thigh this past weekend.  See report.  She has appointment with rheumatology for further assessment  later this week. -will recheck CK level for updated baseline as about 4 weeks out now from her biopsy which could have affected level.  Previous CK levels were not elevated.  White blood cell count has normalized which would hold against any active infection.  Edema at the left thigh appears about the same.  Continues with pain on the left side with pain inhibition with proximal muscles.  July 12-CPK was 39 yesterday.  Patient reviewed with pathologist at [facility] today-no additional results yet-they are to call when have additional results  July 15-appointment with rheumatology this afternoon.  Patient reviewed with rheumatologist earlier today.  No additional records update yet on additional studies muscle biopsy  July 16 - Dr. Florentino able to communicate with Rheum today. Thought remains diabetic muscle infarct, continue ASA.  Start Prednisone 5mg to see if rheumatoid arthritis contributing to some pain and then f/up with Rheum outpatient for biologic agent.     # HTN   Coreg  Clonidine  Hydralazine  Losartan  Nephrology/internal medicine following     #Hypothyroidism  Levothyroxine  June 9 - recheck TSH- addendum Dana 10- On Nov 30, 2021 , on Dec 2, 2021 T4 = 0.90, on Dec 9, T4=1.68. On May 7, TSH = 134. Has been on Levothyroxine 125 mcg/day it appears since at least Dec 13, 2021. See Dr [place]ton's discharge note from Dec 17,2021 which discusses thyroid labs/dosing at that time. On June 9, 2022 TSH = 54.4.   Check free T4. Will get evaluation from Internal Medicine regarding thyroid studies/levothyroxine dosing.  June 11 - levothyroxine increased to 150mcg daily-continue this dose and she will need follow up TSH in 4-6 weeks from June 11 as an outpatient  July 11-repeat TSH and T4 ordered for Monday, July 11 July 12-TSH still elevated, higher at 96.  Levothyroxine increased to 200 mcg daily and separate from other meds.  Free T4 equal 0.68     #CAD  ASA held in setting of ICH - restart aspirin  81 mg daily on May 20, 2022 per Neurology     #Depression   Sertraline  June 28-sertraline dose increased  July 5-reviewed with psychiatry service-continue present regimen     Insomnia-improved on Ambien 5 mg  July 6-fatigue during the day-decrease Ambien 2.5 mg nightly and assess response  July 7-tolerated Ambien 2.5     #GERD   PPI     #Restless leg syndrome  Requip     #DVT prophylaxis-SCDs ordered but patient refusing.  Per neurology note May 11-continue off anticoagulation/antiplatelet for now. Restarted ASA 81 mg on May 20.  May 16-reviewed with patient and try venous foot compression devices  May 20-also not able to tolerate venous foot compression devices.  May 25-bilateral lower extremity venous duplex negative for DVT  July 9-  venous duplex negative for DVT left lower extremity     Diarrhea-July 7-loose stool x5 over the last day.  Lokelma is not associated with diarrhea.  C. difficile was negative on July 5.  Was on cefdinir from June 7 to June 13 and cefazolin one-time dose on Dana 15.  - will recheck C. Difficile  July 11-C. difficile was negative x2.  Diarrhea resolved    DVT prophylaxis:  Medical and mechanical DVT prophylaxis orders are present.    CODE STATUS:    Level Of Support Discussed With: Patient  Code Status (Patient has no pulse and is not breathing): CPR (Attempt to Resuscitate)  Medical Interventions (Patient has pulse or is breathing): Full Support      Gisella Perez MD

## 2022-07-16 NOTE — PLAN OF CARE
Goal Outcome Evaluation:  Plan of Care Reviewed With: patient        Progress: improving  Outcome Evaluation: Outcome Evaluation: Mrs. Martinez is alert and oriented x4. C/o  left hip/thigh pain aching once this shift. Pt is taking scheduled gabapentin and PRN Oxy IR, Has trace edema to bilateral feet and ankles. New order placed for prednisone by Md from RA Md visit. HD catheter clean and intact. Educated patient on IS and proper. Educated and adhere to fluid restriction. Bladder Scan q8, Bladder scan at 16:00 370cc, I&O Cath. No unsafe beahvoirs this shift.

## 2022-07-16 NOTE — PROGRESS NOTES
Name: Zaina Martinez ADMIT: 2022   : 1965  PCP: Karson Oreilly MD    MRN: [id number] LOS: 64 days   AGE/SEX: 56 y.o. female  ROOM: [facility]/     Subjective   Subjective   Sitting up in bed.  On and off again pain.  Blood glucose improved no further hypoglycemias.   Complaints of constipation.     Review of Systems   Constitutional: Negative for chills and fever.   Respiratory: Negative for cough and shortness of breath.    Gastrointestinal: Negative for abdominal distention and abdominal pain.   Neurological: Negative for dizziness and weakness.        Objective   Objective   Vital Signs  Temp:  [98 °F (36.7 °C)-99.3 °F (37.4 °C)] 99.3 °F (37.4 °C)  Heart Rate:  [52-59] 53  Resp:  [16-18] 18  BP: (114-190)/(64-81) 114/64  SpO2:  [90 %-97 %] 94 %  on   ;   Device (Oxygen Therapy): room air  Body mass index is 27.03 kg/m².     Physical Exam  Vitals and nursing note reviewed.   Constitutional:       General: She is sleeping. She is not in acute distress.  HENT:      Head: Normocephalic.   Eyes:      General: Lids are normal.   Cardiovascular:      Rate and Rhythm: Normal rate and regular rhythm.      Pulses: Normal pulses.      Heart sounds: Normal heart sounds.   Pulmonary:      Effort: Pulmonary effort is normal. No respiratory distress.   Abdominal:      General: Bowel sounds are normal. There is no distension.      Palpations: Abdomen is soft.      Tenderness: There is no abdominal tenderness.   Musculoskeletal:      Cervical back: Neck supple.      Right lower leg: No edema.      Left lower leg: No edema.   Skin:     General: Skin is warm and dry.   Neurological:      General: No focal deficit present.      Mental Status: Mental status is at baseline.      Comments: Generalized weakness   Psychiatric:         Mood and Affect: Mood normal.         Behavior: Behavior normal.         Thought Content: Thought content normal.         Judgment: Judgment normal.         Results Review     I reviewed the  patient's new clinical results.  Results from last 7 days   Lab Units 07/15/22  2207 07/12/22  1052 07/11/22  2325 07/10/22  0503   WBC 10*3/mm3 11.29* 6.34 7.02 8.20   HEMOGLOBIN g/dL 8.5* 8.0* 8.3* 7.9*   PLATELETS 10*3/mm3 187 149 156 170     Results from last 7 days   Lab Units 07/15/22  2207 07/13/22  1053 07/11/22  2325 07/10/22  0503   SODIUM mmol/L 134* 129* 133* 133*   POTASSIUM mmol/L 4.2 4.6 4.2 4.4   CHLORIDE mmol/L 98 94* 99 96*   CO2 mmol/L 25.0 23.0 24.0 24.0   BUN mg/dL 15 35* 22* 34*   CREATININE mg/dL 2.03* 3.63* 2.31* 3.17*   GLUCOSE mg/dL 131* 304* 202* 156*   EGFR mL/min/1.73 28.3* 14.1* 24.3* 16.6*     Results from last 7 days   Lab Units 07/15/22  2207 07/12/22  1052 07/11/22  2325 07/10/22  0503   ALBUMIN g/dL 3.00* 2.90* 3.10* 2.80*   BILIRUBIN mg/dL 0.5 0.5 0.5 0.4   ALK PHOS U/L 451* 551* 571* 572*   AST (SGOT) U/L 36* 31 34* 32   ALT (SGPT) U/L 16 18 18 18     Results from last 7 days   Lab Units 07/15/22  2207 07/13/22  1053 07/12/22  1052 07/11/22  2325 07/10/22  0503   CALCIUM mg/dL 8.2* 7.7*  --  8.3* 7.8*   ALBUMIN g/dL 3.00*  --  2.90* 3.10* 2.80*   PHOSPHORUS mg/dL  --  4.3  --  3.0 3.9       Glucose   Date/Time Value Ref Range Status   07/16/2022 1105 185 (H) 70 - 130 mg/dL Final     Comment:     Meter: [id number] : [id number] Otilia Patela NA   07/16/2022 0625 106 70 - 130 mg/dL Final     Comment:     Meter: [id number] : [id number] Dullane Rogers NA   07/15/2022 2018 162 (H) 70 - 130 mg/dL Final     Comment:     Meter: [id number] : [id number] Dulal Sue NA   07/15/2022 1507 119 70 - 130 mg/dL Final     Comment:     Meter: [id number] : [id number] Self Marah RN   07/15/2022 1447 64 (L) 70 - 130 mg/dL Final     Comment:     Treated Patient RN Notified R and V Meter: [id number] : [id number] Self Rosita   07/15/2022 1432 65 (L) 70 - 130 mg/dL Final     Comment:     Meter: [id number] : [id number] Anup Martin NA   07/15/2022 0618 177 (H) 70 - 130 mg/dL Final      Comment:     Meter: [id number] : [id number] León Young NA       No radiology results for the last day  Scheduled Medications  aspirin, 81 mg, Oral, Daily  b complex-vitamin c-folic acid, 1 tablet, Oral, Daily  carvedilol, 25 mg, Oral, BID With Meals  cloNIDine, 0.2 mg, Oral, Q12H  gabapentin, 100 mg, Oral, Q24H  gabapentin, 300 mg, Oral, BID  hydrALAZINE, 100 mg, Oral, Q8H  insulin glargine, 8 Units, Subcutaneous, QAM  insulin lispro, 0-7 Units, Subcutaneous, TID AC  levothyroxine, 200 mcg, Oral, Q AM  lidocaine, 1 patch, Transdermal, Q24H  losartan, 100 mg, Oral, Q24H  miconazole, 1 application, Topical, Q12H  pantoprazole, 40 mg, Oral, Q AM  rOPINIRole, 0.75 mg, Oral, Nightly  sertraline, 150 mg, Oral, Daily  sodium zirconium cyclosilicate, 5 g, Oral, Daily  tamsulosin, 0.4 mg, Oral, Daily    Infusions   Diet  Diet Regular; Thin; Consistent Carbohydrate, Low Potassium, Renal, Daily Fluid Restriction; Other; 1,200; 2 gm (50 mEq)       Assessment/Plan     Active Hospital Problems    Diagnosis  POA   • **Stroke (HCC) [I63.9]  Yes   • 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       56 y.o. female admitted with Stroke (HCC).    -CVA: Continue rehab  -Diabetic muscle infarction: Pain control. Continue ASA. No new findings on workup. Needs PET scan as outpatient & continued Rheumatology follow up  -Chronic diastolic CHF: Intermittent JACKSON. Echo 4/22 EF 66%. Daily fluid restriction.  Na improved 7/14/22.   -ESRD/Hyponatremia: Nephrology  managing  -Hypothyroidism: Repeat TFT's with elevated TSH, low FT4. Levothyroxine increased 7/13. Needs repeat TFT's in 6 weeks  -DM:  No further hypoglycemia.  She is tolerating diet well.  Insulin adjusted 7/15.  BG trends improved.  Will continue current dosing.  She is to possibly start oral steroids per nursing.  If this is the case will expect increase in insulin needs to cover sugars.  Monitor trends and make further adjustments.   -No labs drawn today  - defer constipation management  to Rehab provider.     Discussed with RN staff , patient and family at bedside.         RIKA Mathis  [facility]  07/16/22  12:40 EDT

## 2022-07-16 NOTE — PLAN OF CARE
Goal Outcome Evaluation:  Plan of Care Reviewed With: patient        Progress: improving  Outcome Evaluation: : Mrs. Martinez is alert and oriented x4. C/o  left hip/thigh pain aching twice this shift. Pt is taking scheduled gabapentin and PRN Oxy IR, Has trace edema to bilateral feet and ankles. New order placed for prednisone by MD from RA Md visit. HD catheter clean and intact. Educated patient on IS and proper use. Educated and adhere to fluid restriction. Bladder Scan q8, Bladder scan at 16:00 370cc, I&O Cath 125cc, straw color, cloudy with sediement. No unsafe beahvoirs this shift.

## 2022-07-16 NOTE — PROGRESS NOTES
Inpatient Rehabilitation Plan of Care Note    Plan of Care  Care Plan Reviewed - No updates at this time.    Safety    Performed Intervention(s)  Fall precautions: bed low and locked, chair alarm and bed alarms in use, nonskid  socks and gait belt in use, call light and personal belongings within reach.  Hourly rounding.      Psychosocial    Performed Intervention(s)  Provide distraction and/or relaxation as needed.  Allow extra time for patient to verbalize needs, concerns, feelings, etc.      Body Systems    Performed Intervention(s)  Dialysis mwf  Monitor weight and I/O.  DM educator consult.  Accuchecks as ordered.  Nguyễn catheter care Q shift      Sphincter Control    Performed Intervention(s)  Monitor I/O.  Bowel meds prn.      Pain    Performed Intervention(s)  Pain meds prn .  Ice to LLE per order      Body Function Structure    Performed Intervention(s)  Ointment per MD order    Signed by: Zelda Fernandez RN

## 2022-07-17 LAB
GLUCOSE BLDC GLUCOMTR-MCNC: 243 MG/DL (ref 70–130)
GLUCOSE BLDC GLUCOMTR-MCNC: 264 MG/DL (ref 70–130)
GLUCOSE BLDC GLUCOMTR-MCNC: 268 MG/DL (ref 70–130)
GLUCOSE BLDC GLUCOMTR-MCNC: 270 MG/DL (ref 70–130)

## 2022-07-17 PROCEDURE — 63710000001 INSULIN LISPRO (HUMAN) PER 5 UNITS: Performed by: HOSPITALIST

## 2022-07-17 PROCEDURE — 82962 GLUCOSE BLOOD TEST: CPT

## 2022-07-17 PROCEDURE — 63710000001 PREDNISONE PER 5 MG: Performed by: PHYSICAL MEDICINE & REHABILITATION

## 2022-07-17 RX ORDER — POLYETHYLENE GLYCOL 3350 17 G/17G
17 POWDER, FOR SOLUTION ORAL DAILY PRN
Status: DISCONTINUED | OUTPATIENT
Start: 2022-07-17 | End: 2022-07-22 | Stop reason: HOSPADM

## 2022-07-17 RX ADMIN — OXYCODONE 5 MG: 5 TABLET ORAL at 11:54

## 2022-07-17 RX ADMIN — HYDRALAZINE HYDROCHLORIDE 100 MG: 50 TABLET, FILM COATED ORAL at 13:24

## 2022-07-17 RX ADMIN — ASPIRIN 81 MG: 81 TABLET, COATED ORAL at 07:02

## 2022-07-17 RX ADMIN — PREDNISONE 5 MG: 10 TABLET ORAL at 07:03

## 2022-07-17 RX ADMIN — MICONAZOLE NITRATE 1 APPLICATION: 2 CREAM TOPICAL at 20:31

## 2022-07-17 RX ADMIN — CLONIDINE HYDROCHLORIDE 0.2 MG: 0.1 TABLET ORAL at 20:28

## 2022-07-17 RX ADMIN — HYDRALAZINE HYDROCHLORIDE 100 MG: 50 TABLET, FILM COATED ORAL at 06:27

## 2022-07-17 RX ADMIN — ROPINIROLE HYDROCHLORIDE 0.75 MG: 0.5 TABLET, FILM COATED ORAL at 20:28

## 2022-07-17 RX ADMIN — INSULIN GLARGINE-YFGN 8 UNITS: 100 INJECTION, SOLUTION SUBCUTANEOUS at 07:04

## 2022-07-17 RX ADMIN — HYDRALAZINE HYDROCHLORIDE 100 MG: 50 TABLET, FILM COATED ORAL at 20:30

## 2022-07-17 RX ADMIN — Medication 1 TABLET: at 07:02

## 2022-07-17 RX ADMIN — TAMSULOSIN HYDROCHLORIDE 0.4 MG: 0.4 CAPSULE ORAL at 07:03

## 2022-07-17 RX ADMIN — SERTRALINE 150 MG: 100 TABLET, FILM COATED ORAL at 07:03

## 2022-07-17 RX ADMIN — OXYCODONE 5 MG: 5 TABLET ORAL at 20:29

## 2022-07-17 RX ADMIN — CLONIDINE HYDROCHLORIDE 0.2 MG: 0.1 TABLET ORAL at 07:03

## 2022-07-17 RX ADMIN — INSULIN LISPRO 4 UNITS: 100 INJECTION, SOLUTION INTRAVENOUS; SUBCUTANEOUS at 17:08

## 2022-07-17 RX ADMIN — GABAPENTIN 100 MG: 100 CAPSULE ORAL at 16:00

## 2022-07-17 RX ADMIN — MICONAZOLE NITRATE 1 APPLICATION: 2 CREAM TOPICAL at 09:02

## 2022-07-17 RX ADMIN — GABAPENTIN 300 MG: 300 CAPSULE ORAL at 20:29

## 2022-07-17 RX ADMIN — OXYCODONE 5 MG: 5 TABLET ORAL at 06:31

## 2022-07-17 RX ADMIN — PANTOPRAZOLE SODIUM 40 MG: 40 TABLET, DELAYED RELEASE ORAL at 06:28

## 2022-07-17 RX ADMIN — CARVEDILOL 25 MG: 25 TABLET, FILM COATED ORAL at 07:02

## 2022-07-17 RX ADMIN — ZOLPIDEM TARTRATE 2.5 MG: 5 TABLET ORAL at 20:29

## 2022-07-17 RX ADMIN — HYDRALAZINE HYDROCHLORIDE 10 MG: 10 TABLET, FILM COATED ORAL at 07:10

## 2022-07-17 RX ADMIN — INSULIN LISPRO 3 UNITS: 100 INJECTION, SOLUTION INTRAVENOUS; SUBCUTANEOUS at 07:03

## 2022-07-17 RX ADMIN — LOSARTAN POTASSIUM 100 MG: 100 TABLET, FILM COATED ORAL at 07:03

## 2022-07-17 RX ADMIN — GABAPENTIN 300 MG: 300 CAPSULE ORAL at 07:03

## 2022-07-17 RX ADMIN — OXYCODONE 5 MG: 5 TABLET ORAL at 16:00

## 2022-07-17 RX ADMIN — SODIUM ZIRCONIUM CYCLOSILICATE 5 G: 5 POWDER, FOR SUSPENSION ORAL at 07:03

## 2022-07-17 RX ADMIN — CARVEDILOL 25 MG: 25 TABLET, FILM COATED ORAL at 17:33

## 2022-07-17 RX ADMIN — HYDRALAZINE HYDROCHLORIDE 10 MG: 10 TABLET, FILM COATED ORAL at 17:33

## 2022-07-17 RX ADMIN — LEVOTHYROXINE SODIUM 200 MCG: 0.1 TABLET ORAL at 06:28

## 2022-07-17 RX ADMIN — POLYETHYLENE GLYCOL 3350 17 G: 17 POWDER, FOR SOLUTION ORAL at 07:11

## 2022-07-17 RX ADMIN — INSULIN LISPRO 4 UNITS: 100 INJECTION, SOLUTION INTRAVENOUS; SUBCUTANEOUS at 11:55

## 2022-07-17 NOTE — PROGRESS NOTES
[facility]     Progress Note    Patient Name: Zaina Martinez  : 1965  MRN: [id number]  Primary Care Physician:  Karson Oreilly MD  Date of admission: 2022    Subjective   Subjective     Chief Complaint: Debility    History of Present Illness  Patient Reports her left thigh pain doesn't feel as bad this morning with the steroid.  Denies ha/vision changes/f/c/soa/cp/palpitations.  C/o swelling in both legs    Review of Systems    Objective   Objective     Vitals:   Temp:  [98.5 °F (36.9 °C)-99.3 °F (37.4 °C)] 98.7 °F (37.1 °C)  Heart Rate:  [53-55] 55  Resp:  [18] 18  BP: (114-203)/(64-86) 164/70    Physical Exam   Gen: nad, lying comfortably in bed this morning  MENTAL STATUS: Awake alert  HEENT:  NCAT  CARDIOVASCULAR: Sinus rhythm  CHEST:  hemodialysis access right chest,ctab   RESPIRATORY: Normal respirations.     ABDOMEN: Nondistended     EXTREMITIES: Left thigh edema . Healed incisional biopsy area.  +1 BLE edema     No myoclonus.     Result Review    Result Review:  I have personally reviewed the results from the time of this admission to 2022 11:23 EDT and agree with these findings:  []  Laboratory list / accordion  []  Microbiology  []  Radiology  []  EKG/Telemetry   []  Cardiology/Vascular   []  Pathology  []  Old records  []  Other:  Most notable findings include:    Latest Reference Range & Units 22 11:05 22 16:03 22 20:18 22 06:35 22 10:54   Glucose 70 - 130 mg/dL 185 (H) 160 (H) 145 (H) 243 (H) 264 (H)   (H): Data is abnormally high    Scheduled Meds:aspirin, 81 mg, Oral, Daily  b complex-vitamin c-folic acid, 1 tablet, Oral, Daily  carvedilol, 25 mg, Oral, BID With Meals  cloNIDine, 0.2 mg, Oral, Q12H  gabapentin, 100 mg, Oral, Q24H  gabapentin, 300 mg, Oral, BID  hydrALAZINE, 100 mg, Oral, Q8H  insulin glargine, 8 Units, Subcutaneous, QAM  insulin lispro, 0-7 Units, Subcutaneous, TID AC  levothyroxine, 200 mcg, Oral, Q AM  lidocaine, 1 patch,  Transdermal, Q24H  losartan, 100 mg, Oral, Q24H  miconazole, 1 application, Topical, Q12H  pantoprazole, 40 mg, Oral, Q AM  predniSONE, 5 mg, Oral, Daily With Breakfast  rOPINIRole, 0.75 mg, Oral, Nightly  sertraline, 150 mg, Oral, Daily  sodium zirconium cyclosilicate, 5 g, Oral, Daily  tamsulosin, 0.4 mg, Oral, Daily      Continuous Infusions:   PRN Meds:.•  acetaminophen  •  dextrose  •  dextrose  •  diphenoxylate-atropine  •  glucagon (human recombinant)  •  heparin (porcine)  •  hydrALAZINE  •  nitroglycerin  •  oxyCODONE  •  oxyCODONE  •  polyethylene glycol  •  sodium chloride  •  zolpidem       Assessment & Plan   Assessment / Plan     Brief Patient Summary:  Zaina Martinez is a 56 y.o. female     Active Hospital Problems:  Active Hospital Problems    Diagnosis    • **Stroke (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)    • Other insomnia    • Hyponatremia    • Chronic diastolic CHF (congestive heart failure) ([facility])    • ESRD (end stage renal disease) ([facility])    • Hypothyroidism    • Hyperlipidemia    • Type 2 diabetes mellitus with kidney complication, with long-term current use of insulin ([facility])      Plan:   # R MCA s/p TPA with subsequent ICH with debility  Initially held antiplatelet/anticoagulation.  Reviewed with neurology on May 20 and resume aspirin 81 mg daily  SBP goal <160  Recommend outpatient Neurology fu - repeat MRI in 3 months        # DM  June 27-hyperglycemia on steroids.  Lantus increased to 30 units twice daily, Humalog 5 units 3 times daily with meals, and all increase sliding scale coverage  June 28 -hypoglycemic after increasing insulin dosing.  Blood glucose up to 98 prior to lunch, will hold sliding scale insulin and give 5 units scheduled with meal  June 30-hypoglycemia-Lantus twice daily decreased from 35 to 20 units.  Scheduled Humalog with meals discontinued  July 1-prednisone has been discontinued.  Reviewed with internal medicine and Lantus decreased to 10 units twice daily and on  discharge home to resume her home regimen of Lantus 10 units daily.  She will check her sugar tonight at home and if elevated give Lantus 10 units tonight and then continue with the Lantus 10 units daily tomorrow.  Reviewed with patient and her  that her sugars may be elevated initially as she just completed prednisone.  They were instructed to call for any additional instructions on adjustment in regimen if it remains elevated at home this weekend  July 6-Lantus 7 units daily  July 7-blood sugar range 648-096- yesterday,  this AM  July 15-Lantus increased to 10 units daily  July 16 - monitor Glc with resuming Prednisone today     # ESRD   Nephrology following  Inpatient HD    Flomax  Hyperkalemia  July 5-patient had hemodialysis on July 3 and July 4 for hyperkalemia.  Lokelma resumed by nephrology  July 7-had hemodialysis yesterday and has plans for hemodialysis again today  July 8-hemodialysis again today     Infectious disease-   # s/p catheter infection with MRSA  Vancomycin IV with stop date of May 26  May 24-vancomycin random equal 12.90-on vancomycin 500 mg IV on [id number]-Ezlcdyxp  May 26-to complete course of vancomycin today  #Urinary tract infection-on Dana 3 urinalysis was negative for infection but noted to have leukocytosis increase and repeat urinalysis on June 6 shows findings consistent with urinary tract infection.  Placed on a course of cefdinir renal dose 3 mg daily for 7 days.  No costophrenic angle tenderness patient reviewed with infectious disease service.  Leukocytosis felt related to the bladder infection and not previous catheter infection.  June 8-urine culture results pending.  On cefdinir.  Culture with E. coli, sensitivity pending  June 9 - E coli sensitive to cephalosporins.  June 21 - continues with leukocytosis WBC  15K today. May be from inflammatory process. Steroids added yesterday.   June 22-urine described as milky characteristic, different from  previous-recheck urinalysis with culture if indicated  June 23-Labs are still pending today.  Urinalysis also pending as she does not make much urine.  She had a temperature of 100.2 last evening, afebrile this morning.  June 27 - abnormal UA but urine culture from June 24 no growth  July 1-leukocytosis felt related to the noninfectious process in the left thigh as well as secondary to steroids  July 5-WBC trending down to 15.6 today  July 6-WBC trending down to 12 K  July 7-WBC 8.4     #left hydroureteronephrosis-Dana 3-CT scan shows left hydro ureter nephrosis.  She had some previous dilation of the ureter on comparison the past studies but increased today.  Bladder noted to be markedly distended.  Will do in and out cath now and daily to decompress bladder.  Addendum-intermittent cath for 600 cc.  June 4-once daily intermittent cath 450 cc.  June 5-seen by urology-Nguyễn catheter placed with plans to recheck hydroureter on renal ultrasound in 3 to 5 days.  June 6-Nguyễn catheter placed yesterday by urology, with only 170 cc out so far.  Patient reports did not note any change in her left groin pain after the in and out cath with decompression of the bladder..  June 8-reviewed with urology service-request noncontrast CT scan stone protocol to evaluate for resolution of the left hydronephrosis with urology planning to see tomorrow to review the films and then make recommendations, urology would recommend leaving the Nguyễn catheter in for the time being.  June 9 - improved left hydroureter on CT , Nguyễn discontinued.   July 1-to follow-up with urology as an outpatient with follow-up CT planned in August.  She occasionally required intermittent straight cath but this was very infrequent.  Home health nursing to follow.  Continues on Flomax  July 7-intermittent straight cath 400 cc  July 11-She continues with hydroureteronephrosis on the left side on follow-up CT scan-intermittent straight cath volumes have not been  over about 400 cc over the past month.  Urology re-consulted for updated assessment  July 12-plan is intermittent straight cath routinely once or twice a day  July 14 - ISC for 150 cc           #Anemia-  EPO.  June 6-hemoglobin 7.8  July 12-hemoglobin 8.0     Lymphadenopathy-evaluated by hematology oncology while here.  No significant change from scans earlier this year.  She is to repeat a scan in 3 months and follow-up with hematology oncology  July 1153-[id number] CT of the abdomen pelvis shows lymphadenopathy overall about the same per her report.  To have follow-up with hematology oncology and repeat scan in another 2 months or so     #Elevated alkaline phosphatase  June 6-elevated alkaline phosphatase 770, up from 289 one week ago, 140 one month ago. Similar elevation in March, Feb even higher at 1,330 ( intra-abdominal fluid collection, underwent CT-guided aspiration  Revealed blood and cultures did not reveal any growth and therefore antibiotics  discontinued.  Surgery also did evaluate and signed off.), and elevated during admission in January ( She was seen by general surgery who recommended and performed laparoscopic cholecystectomy during that admission).   ALT 70, AST 87, Total bilirubin 0.8. Ca 8.3.  ? If liver source or bone or due to an inflammatory response.  June 7-GGT also elevated.  Seen by gastroenterology.  West biliary source.  Liver ultrasound ordered  June 8-liver ultrasound was unremarkable  June 21 - patient declined liver biopsy.   June 27-gastroenterology following peripherally-plans to follow-up as an outpatient and review option of liver biopsy again if alkaline phosphatase remains elevated  June 28-remains elevated at 503  June 30-alkaline phosphatase lower at 426  July 6-trend back up to 634  July 8-alkaline phosphatase unchanged 627  July 12-alkaline phosphatase 551     #Pain - neuropathy BL lower extremity/left thigh pain  Gabapentin/oxycodone.    June 7-patient with lethargy this  morning.  May be due to urinary tract infection but with recent increase and gabapentin and oxycodone, will change gabapentin to 200 mg twice a day and decrease oxycodone from 5 back down to 2.5 mg p.o. every 4 hours as needed  June 8-better speed with her processing today  June 21 - pain med regimen recently adjusted with tramadol 25 mg q 4 hours prn, gabapentin 300 g bid, lidoderm patch, oxycodone 2.5 mg q 6 hours prn. Prednisone 60 mg daily added which may help with pain from inflammatory process. Reports pain better today and participated better in therapies.   June 27 - continue present regimen  July 1-home on oxycodone 5 mg p.o. every 4 hours.  Pain dispense #40, gabapentin 300 mg twice daily, Tylenol 500 mg every 6 hours as needed  July 5-increase gabapentin slightly 300-100-300 mg.  Watch for any myoclonic twitching  July 8-had some lethargy earlier today, patient feels that she is tolerating oxycodone and gabapentin.  Was alert when seen on rounds.        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  Seen by infectious disease service with no acute infectious process noted.  Evaluated by hematology regarding lymphadenopathy, lymph node felt too small to biopsy.  Patient was on a course of low-dose prednisone 10 mg for 3 days then 5 mg for 2 days and then another 10 mg dose with out any significant improvement in the swelling or pain.  She has a history of rheumatoid arthritis.  See CT of the left thigh and MRI of the left thigh and pelvis reports   With lumbar radiculoplexitis and diabetic  amyotrophy, on review of the literature do not see edema that she presents with associated with the findings or MRI changes in the tendon and musculature with edema.  There is descriptions of MRI changes in the plexus and peripheral nerve.  In terms of treatment options for diabetic amyotrophy , there have not been definitive clinical trials.  Immunomodulation with steroids has been inconclusive as well as other immunomodulation therapy.  Reviewed with the patient  that  feel that she would benefit from seeing her rheumatologist as an outpatient to evaluate this further, as there does appear to be inflammatory cause given the lymphadenopathy and edema.  Rheumatologist does not come to the hospital.  CPK x2 has been normal.  Aldolase has been normal.  May need to look at biopsy of left thigh muscle to assess further.   June 8-patient reviewed with neurology yesterday who will assess and also provide input regarding whether muscle biopsy may be helpful.  June 9 - Discussed with Neurology and Rheumatology - plan is for EMG and then muscle biopsy.   Dana 15 - Left quadricep muscle biopsy was completed 6/15, results pending.  Labs: CKs have been normal, ANCA panel 6/11 was unremarkable  June 21 - started on full treatment dose of Prednisone 60 mg daily yesterday pending path results. Reports pain better so far today. Specimen sent to [facility]. Clinical impression presently focal inflammatory myositis pending final pathology results.   June 22-pain continues better today.  June 23-outside pathology report still pending  June 24 - Patient reports left thigh pain better over past couple days but still present. Does not have the soreness at medial thigh as before. Complains of pain at mid-thigh. No change in swelling. Does have discomfort with proximal movement in LLE. Walked 320 feet CTG SBA RW today.   Pain improved on steroid. Discontinued atorvastatin. Biopsy results returned from [facility]  - see report -Type 2 fiber atrophy, advanced. Denervation/reinnervation. No evidence of inflammatory myopathy or vasculitis. [facility] lab pursuing a dystrophy panel and will report findings in an addendum.  Reviewed with Neurology - as shown symptomatic response, continue Prednisone 60 mg daily for about one more week, then taper off over month.   June 27 - continue present regimen  June 28-increased pain medication regimen to oxycodone 5 mg every 4 hours as needed for severe pain.   July 1- On review with Rheumatology and Neurology, suspicion is for diabetic muscle infarction. Treatment would be aspirin which she is already on. Discussed with Neurology and as been on Prednisone 60 mg for only 1.5 week, will stop and not do taper. Discussed with Internal Medicine who will adjust insulin.  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  Reviewed with the patient that treatment for this is aspirin which she is already on.  She may do walking activities but would avoid strenuous activities with left quadricep strengthening.  She may strengthen the other 3 extremities as tolerated.  Reviewed may take 3 months to resolve.  July 7-continues with left thigh pain.  She is noted to have increased edema in both lower extremities but particularly the left thigh compared to recent.  Lokelma has been associated with fluid retention which may explain increase edema in part.  Continues on aspirin for suspected diabetic muscular infarct.  No update report yet on additional studies for muscle biopsy.  Initial report was June 24 with additional studies planned  July 11-updated MRI imaging of the left thigh this past weekend.  See report.  She has appointment with rheumatology for further assessment later this week. -will recheck CK level for updated baseline as about 4 weeks out now from her biopsy which could have  affected level.  Previous CK levels were not elevated.  White blood cell count has normalized which would hold against any active infection.  Edema at the left thigh appears about the same.  Continues with pain on the left side with pain inhibition with proximal muscles.  July 12-CPK was 39 yesterday.  Patient reviewed with pathologist at [facility] today-no additional results yet-they are to call when have additional results  July 15-appointment with rheumatology this afternoon.  Patient reviewed with rheumatologist earlier today.  No additional records update yet on additional studies muscle biopsy  July 16 - Dr. Florentino able to communicate with Rheum today. Thought remains diabetic muscle infarct, continue ASA.  Start Prednisone 5mg to see if rheumatoid arthritis contributing to some pain and then f/up with Rheum outpatient for biologic agent.     # HTN   Coreg  Clonidine  Hydralazine  Losartan  Nephrology/internal medicine following  July 17  -  this morning and gave all anti-HTN meds and prn hydralazine. Neprhology saw patient and will continue with plan of HD MWF.     #Hypothyroidism  Levothyroxine  June 9 - recheck TSH- addendum Dana 10- On Nov 30, 2021 , on Dec 2, 2021 T4 = 0.90, on Dec 9, T4=1.68. On May 7, TSH = 134. Has been on Levothyroxine 125 mcg/day it appears since at least Dec 13, 2021. See Dr Templeton's discharge note from Dec 17,2021 which discusses thyroid labs/dosing at that time. On June 9, 2022 TSH = 54.4.   Check free T4. Will get evaluation from Internal Medicine regarding thyroid studies/levothyroxine dosing.  June 11 - levothyroxine increased to 150mcg daily-continue this dose and she will need follow up TSH in 4-6 weeks from June 11 as an outpatient  July 11-repeat TSH and T4 ordered for Monday, July 11 July 12-TSH still elevated, higher at 96.  Levothyroxine increased to 200 mcg daily and separate from other meds.  Free T4 equal 0.68     #CAD  ASA held in setting  of ICH - restart aspirin 81 mg daily on May 20, 2022 per Neurology     #Depression   Sertraline  June 28-sertraline dose increased  July 5-reviewed with psychiatry service-continue present regimen     Insomnia-improved on Ambien 5 mg  July 6-fatigue during the day-decrease Ambien 2.5 mg nightly and assess response  July 7-tolerated Ambien 2.5     #GERD   PPI     #Restless leg syndrome  Requip     #DVT prophylaxis-SCDs ordered but patient refusing.  Per neurology note May 11-continue off anticoagulation/antiplatelet for now. Restarted ASA 81 mg on May 20.  May 16-reviewed with patient and try venous foot compression devices  May 20-also not able to tolerate venous foot compression devices.  May 25-bilateral lower extremity venous duplex negative for DVT  July 9-  venous duplex negative for DVT left lower extremity     Diarrhea-July 7-loose stool x5 over the last day.  Lokelma is not associated with diarrhea.  C. difficile was negative on July 5.  Was on cefdinir from June 7 to June 13 and cefazolin one-time dose on Dana 15.  - will recheck C. Difficile  July 11-C. difficile was negative x2.  Diarrhea resolved    DVT prophylaxis:  Medical and mechanical DVT prophylaxis orders are present.    CODE STATUS:    Level Of Support Discussed With: Patient  Code Status (Patient has no pulse and is not breathing): CPR (Attempt to Resuscitate)  Medical Interventions (Patient has pulse or is breathing): Full Support        Gisella Perez MD

## 2022-07-17 NOTE — PROGRESS NOTES
Name: Zaina Martinez ADMIT: 2022   : 1965  PCP: Karson Oreilly MD    MRN: [id number] LOS: 65 days   AGE/SEX: 56 y.o. female  ROOM: [facility]/     Subjective   Subjective   Sitting up in bed eating lunch.  Looks a little better today.  Blood glucose trends without hypoglycemia noted.  She was started on prednisone which is caused to trend up in her.   Complaints of constipation.   RN reports she has not wanted to get up much out of bed.    Review of Systems   Constitutional: Negative for chills and fever.   Respiratory: Negative for cough and shortness of breath.    Cardiovascular: Negative for chest pain and palpitations.   Gastrointestinal: Positive for constipation. Negative for abdominal distention, abdominal pain, nausea and vomiting.   Musculoskeletal: Positive for arthralgias (Left upper thigh pain) and myalgias.   Neurological: Negative for dizziness and weakness.        Objective   Objective   Vital Signs  Temp:  [98.4 °F (36.9 °C)-98.7 °F (37.1 °C)] 98.4 °F (36.9 °C)  Heart Rate:  [53-55] 53  Resp:  [18] 18  BP: (150-203)/(70-86) 157/71  SpO2:  [92 %-95 %] 94 %  on   ;   Device (Oxygen Therapy): room air  Body mass index is 27.65 kg/m².     Physical Exam  Vitals and nursing note reviewed.   Constitutional:       General: She is sleeping. She is not in acute distress.  HENT:      Head: Normocephalic.   Eyes:      General: Lids are normal. No scleral icterus.     Conjunctiva/sclera: Conjunctivae normal.   Cardiovascular:      Rate and Rhythm: Normal rate and regular rhythm.      Pulses: Normal pulses.      Heart sounds: Normal heart sounds.   Pulmonary:      Effort: Pulmonary effort is normal. No respiratory distress.      Breath sounds: Normal breath sounds.   Abdominal:      General: Bowel sounds are normal. There is no distension.      Palpations: Abdomen is soft.      Tenderness: There is no abdominal tenderness.   Musculoskeletal:      Cervical back: Neck supple.      Right lower leg: No  edema.      Left lower leg: No edema.   Skin:     General: Skin is warm and dry.      Capillary Refill: Capillary refill takes less than 2 seconds.   Neurological:      General: No focal deficit present.      Mental Status: She is oriented to person, place, and time. Mental status is at baseline.      Comments: Generalized weakness   Psychiatric:         Mood and Affect: Mood normal.         Behavior: Behavior normal.         Thought Content: Thought content normal.         Judgment: Judgment normal.         Results Review     I reviewed the patient's new clinical results.  Results from last 7 days   Lab Units 07/15/22  2207 07/12/22  1052 07/11/22  2325   WBC 10*3/mm3 11.29* 6.34 7.02   HEMOGLOBIN g/dL 8.5* 8.0* 8.3*   PLATELETS 10*3/mm3 187 149 156     Results from last 7 days   Lab Units 07/15/22  2207 07/13/22  1053 07/11/22  2325   SODIUM mmol/L 134* 129* 133*   POTASSIUM mmol/L 4.2 4.6 4.2   CHLORIDE mmol/L 98 94* 99   CO2 mmol/L 25.0 23.0 24.0   BUN mg/dL 15 35* 22*   CREATININE mg/dL 2.03* 3.63* 2.31*   GLUCOSE mg/dL 131* 304* 202*   EGFR mL/min/1.73 28.3* 14.1* 24.3*     Results from last 7 days   Lab Units 07/15/22  2207 07/12/22  1052 07/11/22  2325   ALBUMIN g/dL 3.00* 2.90* 3.10*   BILIRUBIN mg/dL 0.5 0.5 0.5   ALK PHOS U/L 451* 551* 571*   AST (SGOT) U/L 36* 31 34*   ALT (SGPT) U/L 16 18 18     Results from last 7 days   Lab Units 07/15/22  2207 07/13/22  1053 07/12/22  1052 07/11/22  2325   CALCIUM mg/dL 8.2* 7.7*  --  8.3*   ALBUMIN g/dL 3.00*  --  2.90* 3.10*   PHOSPHORUS mg/dL  --  4.3  --  3.0       Glucose   Date/Time Value Ref Range Status   07/17/2022 1054 264 (H) 70 - 130 mg/dL Final     Comment:     Meter: [id number] : [id number] Jose Angel Richardsonantha    07/17/2022 0635 243 (H) 70 - 130 mg/dL Final     Comment:     Meter: [id number] : [id number] Chico Rogers    07/16/2022 2018 145 (H) 70 - 130 mg/dL Final     Comment:     Meter: [id number] : [id number] [facility]   07/16/2022  1603 160 (H) 70 - 130 mg/dL Final     Comment:     Meter: [id number] : [id number] Otilia Blanca NA   07/16/2022 1105 185 (H) 70 - 130 mg/dL Final     Comment:     Meter: [id number] : [id number] Otilia Blanca NA   07/16/2022 0625 106 70 - 130 mg/dL Final     Comment:     Meter: [id number] : [id number] Chico Rogers NA   07/15/2022 2018 162 (H) 70 - 130 mg/dL Final     Comment:     Meter: [id number] : [id number] Chico Rogers NA       No radiology results for the last day  Scheduled Medications  aspirin, 81 mg, Oral, Daily  b complex-vitamin c-folic acid, 1 tablet, Oral, Daily  carvedilol, 25 mg, Oral, BID With Meals  cloNIDine, 0.2 mg, Oral, Q12H  gabapentin, 100 mg, Oral, Q24H  gabapentin, 300 mg, Oral, BID  hydrALAZINE, 100 mg, Oral, Q8H  insulin glargine, 8 Units, Subcutaneous, QAM  insulin lispro, 0-7 Units, Subcutaneous, TID AC  levothyroxine, 200 mcg, Oral, Q AM  lidocaine, 1 patch, Transdermal, Q24H  losartan, 100 mg, Oral, Q24H  miconazole, 1 application, Topical, Q12H  pantoprazole, 40 mg, Oral, Q AM  predniSONE, 5 mg, Oral, Daily With Breakfast  rOPINIRole, 0.75 mg, Oral, Nightly  sertraline, 150 mg, Oral, Daily  sodium zirconium cyclosilicate, 5 g, Oral, Daily  tamsulosin, 0.4 mg, Oral, Daily    Infusions   Diet  Diet Regular; Thin; Consistent Carbohydrate, Low Potassium, Renal, Daily Fluid Restriction; Other; 1,200; 2 gm (50 mEq)       Assessment/Plan     Active Hospital Problems    Diagnosis  POA   • **Stroke (HCC) [I63.9]  Yes   • 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       56 y.o. female admitted with Stroke (HCC).    -CVA: Continue rehab  - HTN:  BP trend up this am but  Improved with scheduled antihypertensives and prn hydralazine.  Monitor trends.  Nephrology plans on challenging dry weight on HD Monday.    -Diabetic muscle infarction: Pain control. Continue ASA. No new findings on workup. Needs PET scan as outpatient & continued Rheumatology follow up. RN reports outpt Rheumatology has started prednisone 5 mg daily.   -Chronic diastolic CHF: Intermittent JACKSON. Echo 4/22 EF 66%. Daily fluid restriction.  Denies any Na improved 7/14/22.   -ESRD/Hyponatremia: Nephrology managing  -Hypothyroidism: Repeat TFT's with elevated TSH, low FT4. Levothyroxine increased 7/13. Needs repeat TFT's in 6 weeks with PCP.   -DM:  No further hypoglycemia.  FSBS trending up with addition of prednisone.  She is tolerating diet well.  Insulin adjusted 7/15.  BG trends improved. She has very labile FSBS per RN.  Hesitant to increase basal at this time until further trending observed.  For now will keep current dosing of lantus and cover with SS.    Monitor trends and make further adjustments.   -No labs drawn today.  M-W- F labs ordered.    - defer constipation management  to Rehab provider.   - pushed pulmonary toilet.  IS ordered 7/16.      Discussed with RN staff , patient and family at bedside.         RIKA Mathis  [facility]  07/17/22  12:42 EDT

## 2022-07-17 NOTE — PLAN OF CARE
Goal Outcome Evaluation:  Plan of Care Reviewed With: patient        Progress: improving  Outcome Evaluation: Pt rested well this shift.  C/o pain to left thigh.  PRN oxy 5 x2 this shift.  All meds whole w/water.  Bladder scan>300; i/o cath x1- yielded 100ml.  Pt tolerated well.  Pt BP elevated during early am vitals.   notified, and oncoming RN to administer PRN hydralazine.

## 2022-07-17 NOTE — PROGRESS NOTES
[facility] Progress Note      Patient Name: Zaina Martinez  : 1965  MRN: [id number]  Primary Care Physician:  Karson Oreilly MD  Date of admission: 2022    Subjective     Interval History:   Follow up ESRD. .   Labile blood pressure with systolic blood pressure up to the 200s this morning which improved to 160 after receiving scheduled blood pressure medication in addition to as needed hydralazine    Review of Systems:   Denies chest pain or shortness of breath    Objective     Vitals:   Temp:  [98.5 °F (36.9 °C)-99.3 °F (37.4 °C)] 98.7 °F (37.1 °C)  Heart Rate:  [53-55] 55  Resp:  [18] 18  BP: (114-203)/(64-86) 203/86    Intake/Output Summary (Last 24 hours) at 2022 0757  Last data filed at 2022 0630  Gross per 24 hour   Intake 560 ml   Output 225 ml   Net 335 ml     Seen on dialysis    Physical Exam:   General:  in bed  Conversant.    HEENT: oral mucosa moist.   Neck:RIJ TDC  Lungs:clear to auscultation. Unlabored.    Heart:RRR no s3 or rub.  / early syst m  Abdomen: + bs,soft, nontender   Extremities:1+ -2+ lower ext edema.    Neuro: speech more fluent . Moves all 4 ext.     Scheduled Meds:     aspirin, 81 mg, Oral, Daily  b complex-vitamin c-folic acid, 1 tablet, Oral, Daily  carvedilol, 25 mg, Oral, BID With Meals  cloNIDine, 0.2 mg, Oral, Q12H  gabapentin, 100 mg, Oral, Q24H  gabapentin, 300 mg, Oral, BID  hydrALAZINE, 100 mg, Oral, Q8H  insulin glargine, 8 Units, Subcutaneous, QAM  insulin lispro, 0-7 Units, Subcutaneous, TID AC  levothyroxine, 200 mcg, Oral, Q AM  lidocaine, 1 patch, Transdermal, Q24H  losartan, 100 mg, Oral, Q24H  miconazole, 1 application, Topical, Q12H  pantoprazole, 40 mg, Oral, Q AM  predniSONE, 5 mg, Oral, Daily With Breakfast  rOPINIRole, 0.75 mg, Oral, Nightly  sertraline, 150 mg, Oral, Daily  sodium zirconium cyclosilicate, 5 g, Oral, Daily  tamsulosin, 0.4 mg, Oral, Daily      IV Meds:        Results Reviewed:   I have  personally reviewed the results from the time of this admission to 7/17/2022 07:57 EDT     Results from last 7 days   Lab Units 07/15/22  2207 07/13/22  1053 07/12/22  1052 07/11/22  2325   SODIUM mmol/L 134* 129*  --  133*   POTASSIUM mmol/L 4.2 4.6  --  4.2   CHLORIDE mmol/L 98 94*  --  99   CO2 mmol/L 25.0 23.0  --  24.0   BUN mg/dL 15 35*  --  22*   CREATININE mg/dL 2.03* 3.63*  --  2.31*   CALCIUM mg/dL 8.2* 7.7*  --  8.3*   BILIRUBIN mg/dL 0.5  --  0.5 0.5   ALK PHOS U/L 451*  --  551* 571*   ALT (SGPT) U/L 16  --  18 18   AST (SGOT) U/L 36*  --  31 34*   GLUCOSE mg/dL 131* 304*  --  202*       Estimated Creatinine Clearance: 28.1 mL/min (A) (by C-G formula based on SCr of 2.03 mg/dL (H)).    Results from last 7 days   Lab Units 07/13/22  1053 07/11/22  2325   PHOSPHORUS mg/dL 4.3 3.0             Results from last 7 days   Lab Units 07/15/22  2207 07/12/22  1052 07/11/22  2325   WBC 10*3/mm3 11.29* 6.34 7.02   HEMOGLOBIN g/dL 8.5* 8.0* 8.3*   PLATELETS 10*3/mm3 187 149 156             Assessment / Plan     ASSESSMENT:  1.    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 HD MWF.  2.  Intracerebral bleed and altered mental status.  Rehab .   3.  Infected TDC, s/p removal 5/1. Replaced. Staph aureus. completed vancomycin.  4.  DM2    5.  Coronary artery disease  6.  Acute on chronic diastolic dysfunction . Volume off with HD.   7.  Anemia of CKD, on long-acting ANDRAE as an outpatient.  And hold glucose of hypertension  8. Hypothyroidism.  TSH still very high and Free t4 low .  LHA increased replacement .    9. 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 On ASA.     PLAN:  1.  HD tomorrow, will challenge her dry weight.  Counseled extensively on following low-salt diet.  2. [facility] labs would be fine with renal .      Didi Mcleod MD  07/17/22  07:57 EDT    [facility]  [phone]

## 2022-07-17 NOTE — PLAN OF CARE
Goal Outcome Evaluation:  Plan of Care Reviewed With: patient          Outcome Evaluation: Zaina has been ok today still experiencing left thigh pain, alert and oriented can be forgetful at times, had not had a BM for several days Mirlax ordered and given, she had a small BM this afternoon, not able to void on her own yet requiring bladder scans and I/C >300 ccs, taking oxy IR PRN Q 4 hours, BP very high this AM all scheduled meds given and PRN dose of Hydralazine and BP went down and now controlled, notified Dr. Perez, nephrology and A APRN of HTN this AM, friend visited around lunch time, appeptite good Blood sugars elevated requiring sliding scale insulin with breakfast and lunch so far today.

## 2022-07-18 LAB
ANION GAP SERPL CALCULATED.3IONS-SCNC: 12 MMOL/L (ref 5–15)
B PARAPERT DNA SPEC QL NAA+PROBE: NOT DETECTED
B PERT DNA SPEC QL NAA+PROBE: NOT DETECTED
BUN SERPL-MCNC: 42 MG/DL (ref 6–20)
BUN/CREAT SERPL: 10.9 (ref 7–25)
C PNEUM DNA NPH QL NAA+NON-PROBE: NOT DETECTED
CALCIUM SPEC-SCNC: 7.7 MG/DL (ref 8.6–10.5)
CHLORIDE SERPL-SCNC: 94 MMOL/L (ref 98–107)
CO2 SERPL-SCNC: 20 MMOL/L (ref 22–29)
CREAT SERPL-MCNC: 3.84 MG/DL (ref 0.57–1)
DEPRECATED RDW RBC AUTO: 50.1 FL (ref 37–54)
EGFRCR SERPLBLD CKD-EPI 2021: 13.2 ML/MIN/1.73
ERYTHROCYTE [DISTWIDTH] IN BLOOD BY AUTOMATED COUNT: 17.1 % (ref 12.3–15.4)
FLUAV SUBTYP SPEC NAA+PROBE: NOT DETECTED
FLUBV RNA ISLT QL NAA+PROBE: NOT DETECTED
GLUCOSE BLDC GLUCOMTR-MCNC: 143 MG/DL (ref 70–130)
GLUCOSE BLDC GLUCOMTR-MCNC: 181 MG/DL (ref 70–130)
GLUCOSE BLDC GLUCOMTR-MCNC: 212 MG/DL (ref 70–130)
GLUCOSE BLDC GLUCOMTR-MCNC: 234 MG/DL (ref 70–130)
GLUCOSE BLDC GLUCOMTR-MCNC: 260 MG/DL (ref 70–130)
GLUCOSE SERPL-MCNC: 161 MG/DL (ref 65–99)
HADV DNA SPEC NAA+PROBE: NOT DETECTED
HCOV 229E RNA SPEC QL NAA+PROBE: NOT DETECTED
HCOV HKU1 RNA SPEC QL NAA+PROBE: NOT DETECTED
HCOV NL63 RNA SPEC QL NAA+PROBE: NOT DETECTED
HCOV OC43 RNA SPEC QL NAA+PROBE: NOT DETECTED
HCT VFR BLD AUTO: 27.2 % (ref 34–46.6)
HGB BLD-MCNC: 8.4 G/DL (ref 12–15.9)
HMPV RNA NPH QL NAA+NON-PROBE: NOT DETECTED
HPIV1 RNA ISLT QL NAA+PROBE: NOT DETECTED
HPIV2 RNA SPEC QL NAA+PROBE: NOT DETECTED
HPIV3 RNA NPH QL NAA+PROBE: NOT DETECTED
HPIV4 P GENE NPH QL NAA+PROBE: NOT DETECTED
M PNEUMO IGG SER IA-ACNC: NOT DETECTED
MCH RBC QN AUTO: 25.4 PG (ref 26.6–33)
MCHC RBC AUTO-ENTMCNC: 30.9 G/DL (ref 31.5–35.7)
MCV RBC AUTO: 82.2 FL (ref 79–97)
PLATELET # BLD AUTO: 239 10*3/MM3 (ref 140–450)
PMV BLD AUTO: 10.1 FL (ref 6–12)
POTASSIUM SERPL-SCNC: 5.3 MMOL/L (ref 3.5–5.2)
RBC # BLD AUTO: 3.31 10*6/MM3 (ref 3.77–5.28)
RHINOVIRUS RNA SPEC NAA+PROBE: NOT DETECTED
RSV RNA NPH QL NAA+NON-PROBE: NOT DETECTED
SARS-COV-2 RNA NPH QL NAA+NON-PROBE: NOT DETECTED
SODIUM SERPL-SCNC: 126 MMOL/L (ref 136–145)
WBC NRBC COR # BLD: 11.46 10*3/MM3 (ref 3.4–10.8)

## 2022-07-18 PROCEDURE — 97535 SELF CARE MNGMENT TRAINING: CPT

## 2022-07-18 PROCEDURE — 80048 BASIC METABOLIC PNL TOTAL CA: CPT | Performed by: NURSE PRACTITIONER

## 2022-07-18 PROCEDURE — 97110 THERAPEUTIC EXERCISES: CPT

## 2022-07-18 PROCEDURE — 82962 GLUCOSE BLOOD TEST: CPT

## 2022-07-18 PROCEDURE — 85027 COMPLETE CBC AUTOMATED: CPT | Performed by: NURSE PRACTITIONER

## 2022-07-18 PROCEDURE — 0202U NFCT DS 22 TRGT SARS-COV-2: CPT | Performed by: PHYSICAL MEDICINE & REHABILITATION

## 2022-07-18 PROCEDURE — 63710000001 PREDNISONE PER 5 MG: Performed by: PHYSICAL MEDICINE & REHABILITATION

## 2022-07-18 PROCEDURE — 97530 THERAPEUTIC ACTIVITIES: CPT

## 2022-07-18 PROCEDURE — 63710000001 INSULIN LISPRO (HUMAN) PER 5 UNITS: Performed by: HOSPITALIST

## 2022-07-18 RX ORDER — CLONIDINE HYDROCHLORIDE 0.1 MG/1
0.3 TABLET ORAL EVERY 8 HOURS SCHEDULED
Status: DISCONTINUED | OUTPATIENT
Start: 2022-07-18 | End: 2022-07-22 | Stop reason: HOSPADM

## 2022-07-18 RX ORDER — INSULIN LISPRO 100 [IU]/ML
0-9 INJECTION, SOLUTION INTRAVENOUS; SUBCUTANEOUS
Status: DISCONTINUED | OUTPATIENT
Start: 2022-07-18 | End: 2022-07-19

## 2022-07-18 RX ORDER — CLONIDINE HYDROCHLORIDE 0.1 MG/1
0.3 TABLET ORAL EVERY 8 HOURS SCHEDULED
Status: DISCONTINUED | OUTPATIENT
Start: 2022-07-18 | End: 2022-07-18

## 2022-07-18 RX ORDER — CLONIDINE HYDROCHLORIDE 0.1 MG/1
0.3 TABLET ORAL EVERY 6 HOURS SCHEDULED
Status: DISCONTINUED | OUTPATIENT
Start: 2022-07-18 | End: 2022-07-18

## 2022-07-18 RX ADMIN — CARVEDILOL 25 MG: 25 TABLET, FILM COATED ORAL at 16:59

## 2022-07-18 RX ADMIN — TAMSULOSIN HYDROCHLORIDE 0.4 MG: 0.4 CAPSULE ORAL at 08:23

## 2022-07-18 RX ADMIN — OXYCODONE 5 MG: 5 TABLET ORAL at 13:01

## 2022-07-18 RX ADMIN — SODIUM ZIRCONIUM CYCLOSILICATE 5 G: 5 POWDER, FOR SUSPENSION ORAL at 08:24

## 2022-07-18 RX ADMIN — HYDRALAZINE HYDROCHLORIDE 100 MG: 50 TABLET, FILM COATED ORAL at 22:06

## 2022-07-18 RX ADMIN — ASPIRIN 81 MG: 81 TABLET, COATED ORAL at 08:22

## 2022-07-18 RX ADMIN — ZOLPIDEM TARTRATE 2.5 MG: 5 TABLET ORAL at 22:05

## 2022-07-18 RX ADMIN — HYDRALAZINE HYDROCHLORIDE 10 MG: 10 TABLET, FILM COATED ORAL at 01:59

## 2022-07-18 RX ADMIN — INSULIN LISPRO 2 UNITS: 100 INJECTION, SOLUTION INTRAVENOUS; SUBCUTANEOUS at 13:01

## 2022-07-18 RX ADMIN — SERTRALINE 150 MG: 100 TABLET, FILM COATED ORAL at 08:23

## 2022-07-18 RX ADMIN — GABAPENTIN 100 MG: 100 CAPSULE ORAL at 16:59

## 2022-07-18 RX ADMIN — LEVOTHYROXINE SODIUM 200 MCG: 0.1 TABLET ORAL at 05:29

## 2022-07-18 RX ADMIN — INSULIN LISPRO 3 UNITS: 100 INJECTION, SOLUTION INTRAVENOUS; SUBCUTANEOUS at 08:32

## 2022-07-18 RX ADMIN — INSULIN GLARGINE-YFGN 8 UNITS: 100 INJECTION, SOLUTION SUBCUTANEOUS at 08:31

## 2022-07-18 RX ADMIN — OXYCODONE 5 MG: 5 TABLET ORAL at 17:03

## 2022-07-18 RX ADMIN — CLONIDINE HYDROCHLORIDE 0.3 MG: 0.1 TABLET ORAL at 22:07

## 2022-07-18 RX ADMIN — GABAPENTIN 300 MG: 300 CAPSULE ORAL at 08:22

## 2022-07-18 RX ADMIN — LOSARTAN POTASSIUM 100 MG: 100 TABLET, FILM COATED ORAL at 08:23

## 2022-07-18 RX ADMIN — ACETAMINOPHEN 500 MG: 500 TABLET, FILM COATED ORAL at 06:08

## 2022-07-18 RX ADMIN — Medication 1 TABLET: at 08:23

## 2022-07-18 RX ADMIN — GABAPENTIN 300 MG: 300 CAPSULE ORAL at 20:45

## 2022-07-18 RX ADMIN — ROPINIROLE HYDROCHLORIDE 0.75 MG: 0.5 TABLET, FILM COATED ORAL at 20:45

## 2022-07-18 RX ADMIN — HYDRALAZINE HYDROCHLORIDE 100 MG: 50 TABLET, FILM COATED ORAL at 06:08

## 2022-07-18 RX ADMIN — OXYCODONE 5 MG: 5 TABLET ORAL at 08:33

## 2022-07-18 RX ADMIN — CLONIDINE HYDROCHLORIDE 0.2 MG: 0.1 TABLET ORAL at 08:23

## 2022-07-18 RX ADMIN — MICONAZOLE NITRATE 1 APPLICATION: 2 CREAM TOPICAL at 08:37

## 2022-07-18 RX ADMIN — PANTOPRAZOLE SODIUM 40 MG: 40 TABLET, DELAYED RELEASE ORAL at 06:08

## 2022-07-18 RX ADMIN — CARVEDILOL 25 MG: 25 TABLET, FILM COATED ORAL at 08:22

## 2022-07-18 RX ADMIN — PREDNISONE 5 MG: 10 TABLET ORAL at 08:22

## 2022-07-18 RX ADMIN — OXYCODONE 5 MG: 5 TABLET ORAL at 01:54

## 2022-07-18 RX ADMIN — MICONAZOLE NITRATE 1 APPLICATION: 2 CREAM TOPICAL at 22:07

## 2022-07-18 RX ADMIN — OXYCODONE 5 MG: 5 TABLET ORAL at 22:05

## 2022-07-18 NOTE — PROGRESS NOTES
Spoke with patient's , by phone, today to see how appointment and car transfers went on Friday. He felt she did better getting in the car than out and did have help from staff. He said patient was hurting so he felt bad for her, but he does feel he could do transfers with patient if needs to transport her to dialysis until [facility] 3 approved. Discussed facilities. He would prefer patient to go to [facility] as this is very close to their home and would be convenient to her dialysis center. Talked with carin Tellez for [facility]. [facility] does not have bed available until later in week and then will only have one. She is not sure if they would accept patient. Discussed situation with patient and that plan is for patient to go home with  with daughter helping during day. She will check with [facility] to see if would accept patient but was not sure this would be option. Asked her also to check with [facility] for bed availability. Referral made to [facility] and [facility] as this might be a little closer for them. Left message for [facility] liaison to see what their bed availability is this week. Will contact more facilities and discuss with patient, , and daughter. Trying to find best option for patient and family.

## 2022-07-18 NOTE — PROGRESS NOTES
Inpatient Rehabilitation Functional Measures Assessment and Plan of Care    Plan of Care  Updated Problems/Interventions  Mobility    [OT] Toilet Transfers(Active)  Current Status(07/18/2022): MIN  Weekly Goal(07/19/2022): CGA  Discharge Goal: CGA    [OT] Tub/Shower Transfers(Active)  Current Status(07/18/2022): min A  Weekly Goal(07/19/2022): CGA  Discharge Goal: CGA        Self Care    [OT] Bathing(Active)  Current Status(07/18/2022): UB-SBA, LB-MIN/MOD  Weekly Goal(07/19/2022): MIn  Discharge Goal: MIN    [OT] Dressing (Lower)(Active)  Current Status(07/18/2022): Mod/MAX  Weekly Goal(07/19/2022): MOD  Discharge Goal: MOD    [OT] Dressing (Upper)(Active)  Current Status(07/18/2022): set up  Weekly Goal(07/19/2022): SBA  Discharge Goal: SBA    [OT] Grooming(Active)  Current Status(07/18/2022): set up  Weekly Goal(07/19/2022): SBA  Discharge Goal: SBA    [OT] Toileting(Active)  Current Status(07/18/2022): MIN/MOD  Weekly Goal(07/19/2022): MIN  Discharge Goal: MIN    Functional Measures  JEREMI Eating:  Branch  JEREMI Grooming: Branch  JEREMI Bathing:  Branch  JEREMI Upper Body Dressing:  Branch  JEREMI Lower Body Dressing:  Branch  JEREMI Toileting:  Branch    JEREMI Bladder Management  Level of Assistance:  Branch  Frequency/Number of Accidents this Shift:  Branch    JEREMI Bowel Management  Level of Assistance: Branch  Frequency/Number of Accidents this Shift: Branch    JEREMI Bed/Chair/Wheelchair Transfer:  Branch  JEREMI Toilet Transfer:  Branch  JEREMI Tub/Shower Transfer:  Branch    Previously Documented Mode of Locomotion at Discharge: Field  JEREMI Expected Mode of Locomotion at Discharge: Branch  JEREMI Walk/Wheelchair:  Branch  JEREMI Stairs:  Branch    JEREMI Comprehension:  Branch  JEREMI Expression:  Branch  JEREMI Social Interaction:  Branch  JEREMI Problem Solving:  Branch  JEREMI Memory:  Branch    Therapy Mode Minutes  Occupational Therapy: Branch  Physical Therapy: Branch  Speech Language Pathology:  Branch    Signed by: Tasha Helm OT

## 2022-07-18 NOTE — THERAPY TREATMENT NOTE
Inpatient Rehabilitation - Physical Therapy Treatment Note       [facility]     Patient Name: Zaina Martinez  : 1965  MRN: [id number]    Today's Date: 2022                    Admit Date: 2022      Visit Dx:     ICD-10-CM ICD-9-CM   1. Generalized weakness  R53.1 780.79   2. 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 Other insomnia  G47.09 780.52       Patient Active Problem List   Diagnosis   • Renal insufficiency   • Hypertensive disorder   • Hypothyroidism   • Type 2 diabetes mellitus with kidney complication, with long-term current use of insulin ([facility])   • Rheumatoid arthritis ([facility])   • Angioedema   • Esophageal dysmotility   • Anemia   • Medically noncompliant   • Myocardial infarction due to demand ischemia ([facility])   • Enteritis   • PRES (posterior reversible encephalopathy syndrome)   • Urine retention   • Klebsiella infection   • Superficial thrombophlebitis   • Generalized weakness   • ESRD (end stage renal disease) ([facility])   • CAD (coronary artery disease)   • Abnormal urinalysis   • Chronic diastolic CHF (congestive heart failure) ([facility])   • Pyelonephritis   • Calculus of gallbladder with acute on chronic cholecystitis without obstruction   • Pleural effusion on right   • Anemia due to chronic kidney disease, on chronic dialysis ([facility])   • Abnormal findings on diagnostic imaging of other specified body structures   • Acute upper respiratory infection   • Agitation   • Alkaline phosphatase raised   • Casts present in urine   • Cellulitis of toe   • Hip pain   • Community acquired pneumonia   • Depressive disorder   • Diarrhea of presumed infectious origin   • Difficult or painful urination   • Disease due to severe acute respiratory syndrome coronavirus 2 (SARS-CoV-2)   • Dyspnea   • Encounter for follow-up examination after completed treatment for conditions other than malignant neoplasm   • H/O: hypothyroidism   • Hyperlipidemia   • Hypomagnesemia   • Intractable  vomiting with nausea   • Leukocytosis   • Luetscher's syndrome   • Need for influenza vaccination   • Restless legs   • Noncompliance with treatment   • Shoulder pain   • Urinary tract infectious disease   • Metabolic encephalopathy   • Abnormal findings on diagnostic imaging of abdomen   • Status post cholecystectomy   • Hyponatremia   • Leukocytosis   • Acute metabolic encephalopathy   • Encephalopathy, toxic   • Acute CVA (cerebrovascular accident) (HCC)   • Intracranial hemorrhage (HCC)   • Stroke (HCC)   • Abnormal urinalysis   • Diabetic muscle infarction (HCC)   • Other insomnia       Past Medical History:   Diagnosis Date   • Acute CVA (cerebrovascular accident) (HCC) 5/1/2022   • Acute on chronic diastolic CHF (congestive heart failure) (HCC)    • CAD (coronary artery disease) 12/20/2021   • Diabetes (HCC)    • Disease of thyroid gland    • GERD (gastroesophageal reflux disease)    • Hyperlipidemia 11/30/2018   • Hypertension    • Rheumatoid arthritis (HCC)        Past Surgical History:   Procedure Laterality Date   • CHOLECYSTECTOMY WITH INTRAOPERATIVE CHOLANGIOGRAM N/A 1/10/2022    Procedure: Laparoscopic cholecystectomy with intraoperative cholangiogram;  Surgeon: Ramana Raygoza MD;  Location: [facility];  Service: General;  Laterality: N/A;   • EYE SURGERY     • HYSTERECTOMY     • INSERTION HEMODIALYSIS CATHETER N/A 12/6/2021    Procedure: HEMODIALYSIS CATHETER INSERTION;  Surgeon: Keli Salazar MD;  Location: [facility] 18/19;  Service: Vascular;  Laterality: N/A;   • INSERTION HEMODIALYSIS CATHETER N/A 5/3/2022    Procedure: TUNNELED CATHETER PLACEMENT;  Surgeon: Keli Salazar MD;  Location: [facility];  Service: Vascular;  Laterality: N/A;   • MUSCLE BIOPSY Left 6/15/2022    Procedure: Left quadriceps muscle biopsy;  Surgeon: Marques Ma MD;  Location: [facility];  Service: Neurosurgery;  Laterality: Left;       PT ASSESSMENT (last 12 hours)     IRF PT  Evaluation and Treatment     Row Name 07/18/22 0958          PT Time and Intention    Document Type daily treatment  -DP     Mode of Treatment physical therapy  -DP     Patient/Family/Caregiver Comments/Observations pt up in w/c upon PT arrival. Pt states the glaze in her R eye is getting worse since she has not had her shots.  -DP     Row Name 07/18/22 0958          General Information    Patient Profile Reviewed yes  -DP     Existing Precautions/Restrictions fall;other (see comments)  covid rule out and contact for MRSA  -DP     Row Name 07/18/22 0958          Pain Assessment    Pretreatment Pain Rating 7/10  -DP     Posttreatment Pain Rating 8/10  -DP     Pain Location - Side/Orientation Left  -DP     Pain Location anterior  -DP     Row Name 07/18/22 0958          Cognition/Psychosocial    Affect/Mental Status (Cognition) flat/blunted affect  -DP     Behavioral Issues (Cognition) other (see comments);overwhelmed easily  -DP     Orientation Status (Cognition) oriented x 3  -DP     Follows Commands (Cognition) follows one-step commands  -DP     Personal Safety Interventions safety round/check completed;elopement precautions initiated;fall prevention program maintained;gait belt;muscle strengthening facilitated;supervised activity;nonskid shoes/slippers when out of bed  -DP     Row Name 07/18/22 0958          Bed Mobility    Bed Mobility bed mobility (all) activities;supine-sit;sit-supine  -DP     Rolling Left [place] (Bed Mobility) standby assist  -DP     Rolling Right [place] (Bed Mobility) standby assist  -DP     Scooting/Bridging [place] (Bed Mobility) contact guard  -DP     Supine-Sit [place] (Bed Mobility) moderate assist (50% patient effort)  -DP     Sit-Supine [place] (Bed Mobility) minimum assist (75% patient effort);verbal cues  -DP     Bed Mobility, Safety Issues decreased use of legs for bridging/pushing  -DP     Assistive Device (Bed Mobility) bed rails;head of bed elevated  -DP      Comment, (Bed Mobility) asseed on mat table  -DP     Row Name 07/18/22 0958          Transfer Assessment/Treatment    Transfers sit-stand transfer;stand-sit transfer  -DP     Comment, (Transfers) [place]  -DP     Row Name 07/18/22 0958          Transfers    Bed-Chair [place] (Transfers) minimum assist (75% patient effort)  -DP     Chair-Bed [place] (Transfers) minimum assist (75% patient effort)  -DP     Assistive Device (Bed-Chair Transfers) walker, front-wheeled  -DP     Sit-Stand [place] (Transfers) minimum assist (75% patient effort);moderate assist (50% patient effort)  -DP     Stand-Sit [place] (Transfers) minimum assist (75% patient effort)  -DP     Row Name 07/18/22 0958          Chair-Bed Transfer    Assistive Device (Chair-Bed Transfers) wheelchair;walker, front-wheeled  -DP     Row Name 07/18/22 0958          Sit-Stand Transfer    Assistive Device (Sit-Stand Transfers) walker, front-wheeled  -DP     Row Name 07/18/22 0958          Stand-Sit Transfer    Assistive Device (Stand-Sit Transfers) walker, front-wheeled;wheelchair  -DP     Row Name 07/18/22 0958          Gait/Stairs (Locomotion)    [place] Level (Gait) minimum assist (75% patient effort);1 person to manage equipment  -DP     Assistive Device (Gait) walker, front-wheeled  -DP     Distance in Feet (Gait) 40'x1 in AM  -DP     Pattern (Gait) step-to  -DP     Deviations/Abnormal Patterns (Gait) antalgic;gait speed decreased;kenn decreased;stride length decreased;weight shifting decreased  -DP     Bilateral Gait Deviations heel strike decreased;forward flexed posture  -DP     Comment, (Gait/Stairs) very slow gait and was able to ambulated up to 40' in AM and refused in PM due to pain  -DP     Row Name 07/18/22 0958          Wheelchair Mobility/Management    Method of Wheelchair Locomotion (Mobility) bimanual (upper extremity) propulsion  -DP     Mobility Activities (Wheelchair) steering;turning  -DP     Forward Propulsion  [place] (Wheelchair) standby assist  -DP     Steering [place] (Wheelchair) standby assist  -DP     Turning [place] (Wheelchair) standby assist;minimum assist (75% patient effort)  -DP     Distance Propelled in Feet (Wheelchair) 75  -DP     Comment, Parts Management (Wheelchair) TC for unlocking of brakes and [place] for avoiding objects at times likely due to vision  -DP     Row Name 07/18/22 0958          Safety Issues, Functional Mobility    Impairments Affecting Function (Mobility) balance;endurance/activity tolerance;pain  -DP     Row Name 07/18/22 0958          Motor Skills    Therapeutic Exercise knee;ankle;hip  -DP     Row Name 07/18/22 0958          Hip (Therapeutic Exercise)    Hip (Therapeutic Exercise) isometric exercises  -DP     Hip Isometrics (Therapeutic Exercise) bilateral;gluteal sets;10 repetitions;2 sets;aDduction;other (see comments);3 second hold  adduction was performed with a manual 3 second hold for 10 reps  -DP     Hip Strengthening (Therapeutic Exercise) bilateral;aBduction;yellow;resistance band;10 repetitions  -DP     Row Name 07/18/22 0958          Knee (Therapeutic Exercise)    Knee (Therapeutic Exercise) strengthening exercise  -DP     Knee Strengthening (Therapeutic Exercise) bilateral;SAQ (short arc quad);hamstring curls;yellow;resistance band;10 repetitions;2 sets  -DP     Row Name 07/18/22 0958          Ankle (Therapeutic Exercise)    Ankle (Therapeutic Exercise) strengthening exercise  -DP     Ankle Strengthening (Therapeutic Exercise) bilateral;dorsiflexion;yellow;resistance band;plantarflexion;20 repititions  -DP     Row Name 07/18/22 0958          Positioning and Restraints    Pre-Treatment Position sitting in chair/recliner  -DP     Post Treatment Position wheelchair  -DP     In Wheelchair sitting;call light within reach;encouraged to call for assist;exit alarm on  -DP           User Key  (r) = Recorded By, (t) = Taken By, (c) = Cosigned By    Initials Name  Provider Type    Jay Rios, VICKIE Physical Therapist              Wound 06/15/22 1312 Left anterior thigh Incision (Active)   Closure Liquid skin adhesive 07/17/22 2028   Base dry;clean 07/17/22 2028   Drainage Amount none 07/18/22 0819   Dressing Care open to air 07/18/22 0819     Physical Therapy Education                 Title: PT OT SLP Therapies (In Progress)     Topic: Physical Therapy (In Progress)     Point: Mobility training (Done)     Learning Progress Summary           Patient Acceptance, E,D, VU,DU by DP at 7/18/2022 1550   Family Acceptance, E,D, VU,NR by LB at 7/13/2022 1456    Comment: Stairs, (dgt practiced). 2 Helpers for stairs used.  Attempted car tsf to dgts car. Unable to perform as the SUV was too high for pnt. A platform step was offered for practice, but pnt declined.  Pnt refused amb during fam teaching, 2ary to L thigh pain.   Significant Other Acceptance, E, VU by LB at 7/15/2022 1556    Comment: car tsf      Show all documentation for this point (41)                 Point: Home exercise program (In Progress)     Learning Progress Summary           Patient Acceptance, E,TB, NR by MS at 7/11/2022 1434      Show all documentation for this point (13)                 Point: Body mechanics (Done)     Learning Progress Summary           Patient Acceptance, E,D, VU,DU by DP at 7/18/2022 1550   Family Acceptance, E, VU by LB at 7/13/2022 1504      Show all documentation for this point (9)                 Point: Precautions (Done)     Learning Progress Summary           Patient Acceptance, E,D, VU,DU by DP at 7/18/2022 1550      Show all documentation for this point (9)                             User Key     Initials Effective Dates Name Provider Type Discipline    LB 03/07/18 -  Liz Rodriguez, PTA Physical Therapist Assistant PT    MS 06/16/21 -  Teagan Santos PT Physical Therapist PT    DP 08/24/21 -  Jay Jim PT Physical Therapist PT                PT Recommendation and  Plan                          Time Calculation:      PT Charges     Row Name 07/18/22 1619 07/18/22 1550          Time Calculation    Start Time 1230  -DP 0930  -DP     Stop Time 1300  -DP 1030  -DP     Time Calculation (min) 30 min  -DP 60 min  -DP     PT Received On -- 07/18/22  -DP     PT - Next Appointment -- 07/19/22  -DP            Time Calculation- PT    Total Timed Code Minutes- PT 30 minute(s)  -DP 60 minute(s)  -DP           User Key  (r) = Recorded By, (t) = Taken By, (c) = Cosigned By    Initials Name Provider Type    DP Jay Jim, PT Physical Therapist                Therapy Charges for Today     Code Description Service Date Service Provider Modifiers Qty    [id number]  PT THER PROC EA 15 MIN 7/18/2022 Jay Jim PT GP 4    [id number]  PT THERAPEUTIC ACT EA 15 MIN 7/18/2022 Jay Jim PT GP 2        Patient was wearing a face mask during this therapy encounter. Therapist used appropriate personal protective equipment including eye protection, mask, and gloves.  Mask used was N95/duckbill. Appropriate PPE was worn during the entire therapy session. Hand hygiene was completed before and after therapy session. Patient is in enhanced droplet precautions.       PT G-Codes  AM-PAC 6 Clicks Score (PT): 17      Jay Jim PT  7/18/2022

## 2022-07-18 NOTE — PROGRESS NOTES
Inpatient Rehabilitation Plan of Care Note    Plan of Care  Updated Problems/Interventions  Mobility    [PT] Bed/Chair/Wheelchair(Active)  Current Status(07/18/2022): [place]  Weekly Goal(07/25/2022): CGA  Discharge Goal: CGA    [PT] Bed Mobility(Active)  Current Status(07/18/2022): modA to get LLE into bed  Weekly Goal(07/25/2022): [place]  Discharge Goal: [place]    [PT] Car Transfers(Active)  Current Status(07/18/2022): CGA, RW (on 7/2)  Weekly Goal(07/25/2022): PT only  Discharge Goal: [place]    [PT] Walk(Active)  Current Status(07/18/2022): 40' CGA-[place], RW  Weekly Goal(07/25/2022): to BR, RW, CGA/SBA  Discharge Goal: 160', AAD,SBA/CGA    [PT] Stairs(Active)  Current Status(07/18/2022): 4, raedison, [place] (on 7/2)  Weekly Goal(07/25/2022): PT only  Discharge Goal: 4, 1 rail, [place]    Signed by: Jay Jim, PT

## 2022-07-18 NOTE — PLAN OF CARE
Goal Outcome Evaluation:  Plan of Care Reviewed With: patient             Problem: Rehabilitation (IRF) Plan of Care  Goal: Plan of Care Review  Recent Flowsheet Documentation  Taken 7/18/2022 0233 by Mona Marie RN  Outcome Evaluation: Alert and oriented x 4. Blood pressure elevated at HS, rechecked during 0200 rounds, remain elevated, 180/76. PRN 10mg Hydralazine administered along with Vanessa 5mg. 0200 blood sugar 234. Pt able to turn self in bed. Will have dialysis MWF. Resting well. No unsafe behaviors. Call light within reach.

## 2022-07-18 NOTE — PROGRESS NOTES
Name: Zaina Martinez ADMIT: 2022   : 1965  PCP: Karson Oreilly MD    MRN: [id number] LOS: 66 days   AGE/SEX: 56 y.o. female  ROOM: [facility]     Subjective   Subjective   Sitting up in bed eating lunch. No  New complaints. hypoglycemia resolved.          Review of Systems   Constitutional: Negative for chills and fever.   Respiratory: Negative for cough and shortness of breath.    Cardiovascular: Negative for chest pain and palpitations.   Gastrointestinal: Negative for abdominal distention, abdominal pain, constipation, nausea and vomiting.   Musculoskeletal: Positive for arthralgias (Left upper thigh pain) and myalgias.   Neurological: Negative for dizziness and weakness.        Objective   Objective   Vital Signs  Temp:  [97.7 °F (36.5 °C)-98.4 °F (36.9 °C)] 97.8 °F (36.6 °C)  Heart Rate:  [52-53] 52  Resp:  [17-18] 17  BP: (157-189)/(71-85) 179/73  SpO2:  [94 %-97 %] 97 %  on   ;   Device (Oxygen Therapy): room air  Body mass index is 28.65 kg/m².     Physical Exam  Vitals and nursing note reviewed.   Constitutional:       General: She is sleeping. She is not in acute distress.  HENT:      Head: Normocephalic.   Eyes:      General: Lids are normal. No scleral icterus.     Conjunctiva/sclera: Conjunctivae normal.   Cardiovascular:      Rate and Rhythm: Normal rate and regular rhythm.      Pulses: Normal pulses.      Heart sounds: Normal heart sounds.   Pulmonary:      Effort: Pulmonary effort is normal. No respiratory distress.      Breath sounds: Normal breath sounds.   Abdominal:      General: Bowel sounds are normal. There is no distension.      Palpations: Abdomen is soft.      Tenderness: There is no abdominal tenderness.   Musculoskeletal:      Cervical back: Neck supple.      Right lower leg: No edema.      Left lower leg: No edema.   Skin:     General: Skin is warm and dry.      Capillary Refill: Capillary refill takes less than 2 seconds.   Neurological:      General: No focal deficit  present.      Mental Status: She is oriented to person, place, and time. Mental status is at baseline.      Comments: Generalized weakness   Psychiatric:         Mood and Affect: Mood normal.         Behavior: Behavior normal.         Thought Content: Thought content normal.         Judgment: Judgment normal.         Results Review     I reviewed the patient's new clinical results.  Results from last 7 days   Lab Units 07/15/22  2207 07/12/22  1052 07/11/22  2325   WBC 10*3/mm3 11.29* 6.34 7.02   HEMOGLOBIN g/dL 8.5* 8.0* 8.3*   PLATELETS 10*3/mm3 187 149 156     Results from last 7 days   Lab Units 07/15/22  2207 07/13/22  1053 07/11/22  2325   SODIUM mmol/L 134* 129* 133*   POTASSIUM mmol/L 4.2 4.6 4.2   CHLORIDE mmol/L 98 94* 99   CO2 mmol/L 25.0 23.0 24.0   BUN mg/dL 15 35* 22*   CREATININE mg/dL 2.03* 3.63* 2.31*   GLUCOSE mg/dL 131* 304* 202*   EGFR mL/min/1.73 28.3* 14.1* 24.3*     Results from last 7 days   Lab Units 07/15/22  2207 07/12/22  1052 07/11/22  2325   ALBUMIN g/dL 3.00* 2.90* 3.10*   BILIRUBIN mg/dL 0.5 0.5 0.5   ALK PHOS U/L 451* 551* 571*   AST (SGOT) U/L 36* 31 34*   ALT (SGPT) U/L 16 18 18     Results from last 7 days   Lab Units 07/15/22  2207 07/13/22  1053 07/12/22  1052 07/11/22  2325   CALCIUM mg/dL 8.2* 7.7*  --  8.3*   ALBUMIN g/dL 3.00*  --  2.90* 3.10*   PHOSPHORUS mg/dL  --  4.3  --  3.0       Glucose   Date/Time Value Ref Range Status   07/18/2022 0611 212 (H) 70 - 130 mg/dL Final     Comment:     Meter: [id number] : [id number] León Young    07/18/2022 0152 234 (H) 70 - 130 mg/dL Final     Comment:     Meter: [id number] : [id number] Frank Dunn RN   07/17/2022 2033 268 (H) 70 - 130 mg/dL Final     Comment:     Meter: [id number] : [id number] León Young NA   07/17/2022 1623 270 (H) 70 - 130 mg/dL Final     Comment:     Meter: [id number] : [id number] Randolph Melissa    07/17/2022 1054 264 (H) 70 - 130 mg/dL Final     Comment:     Meter: [id number] :  [id number] Jose Angel SAAVEDRA   07/17/2022 0635 243 (H) 70 - 130 mg/dL Final     Comment:     Meter: [id number] : [id number] [place] SAAVEDRA   07/16/2022 2018 145 (H) 70 - 130 mg/dL Final     Comment:     Meter: [id number] : [id number] [place] SAAVEDRA       No radiology results for the last day  Scheduled Medications  aspirin, 81 mg, Oral, Daily  b complex-vitamin c-folic acid, 1 tablet, Oral, Daily  carvedilol, 25 mg, Oral, BID With Meals  cloNIDine, 0.2 mg, Oral, Q12H  gabapentin, 100 mg, Oral, Q24H  gabapentin, 300 mg, Oral, BID  hydrALAZINE, 100 mg, Oral, Q8H  insulin glargine, 8 Units, Subcutaneous, QAM  insulin lispro, 0-7 Units, Subcutaneous, TID AC  levothyroxine, 200 mcg, Oral, Q AM  lidocaine, 1 patch, Transdermal, Q24H  losartan, 100 mg, Oral, Q24H  miconazole, 1 application, Topical, Q12H  pantoprazole, 40 mg, Oral, Q AM  predniSONE, 5 mg, Oral, Daily With Breakfast  rOPINIRole, 0.75 mg, Oral, Nightly  sertraline, 150 mg, Oral, Daily  sodium zirconium cyclosilicate, 5 g, Oral, Daily  tamsulosin, 0.4 mg, Oral, Daily    Infusions   Diet  Diet Regular; Thin; Consistent Carbohydrate, Low Potassium, Renal, Daily Fluid Restriction; Other; 1,200; 2 gm (50 mEq)       Assessment/Plan     Active Hospital Problems    Diagnosis  POA   • **Stroke (HCC) [I63.9]  Yes   • 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       56 y.o. female admitted with Stroke (HCC).  -DM:  No further hypoglycemia.  FSBS trending up with addition of prednisone.  She is tolerating  diet well.   Hesitant to increase basal at this time until further trending observed. Current dosing of lantus and increase SS to moderate.      -CVA: Continue rehab  - HTN:  BP is elevated. Increase clonidine to q 8 hours. .  Monitor trends.  Nephrology plans on challenging dry weight on HD Monday.    -Diabetic muscle infarction: Pain control. Continue ASA. No new findings on workup. Needs PET scan as outpatient & continued Rheumatology follow up. RN reports outpt Rheumatology has started prednisone 5 mg daily.   -Chronic diastolic CHF: Intermittent JACKSON. Echo 4/22 EF 66%. Daily fluid restriction.  Denies any Na improved 7/14/22.   -ESRD/Hyponatremia: Nephrology managing  -Hypothyroidism: Repeat TFT's with elevated TSH, low FT4. Levothyroxine increased 7/13. Needs repeat TFT's in 6 weeks with PCP.  -No labs drawn today.  M-W- F labs ordered.    - defer constipation management  to Rehab provider.   - pushed pulmonary toilet.  IS ordered 7/16.      Discussed with RN staff , patient and Dr Larios family at bedside.    Will monitor glucose remotely tmrw but will not see unless needed.     RIKA Mckay  [facility]  07/18/22  10:34 EDT

## 2022-07-18 NOTE — PROGRESS NOTES
"Nutrition Services    Patient Name:  Zaina Martinez  YOB: 1965  MRN: [id number]  Admit Date:  5/13/2022    PROGRESS NOTE - REHAB    Encounter Information         Trending Narrative Follow up  7/18: Intake trend better, many %. DC pending. HD 3x per week.     7/11: Reportedly eating better. Family brings in food at times. Still has thigh edema (s/p L quad muscle bx). HD 3 x per week.     7/4: AMY CHAIDEZ concerned with pt's ^K+ levels. RN added \"renal\" restriction to diet. Remains on fluid restriction. Pt was off unit in HD.     6/27: PRN pain medications less frequently.  Eating better, 100% at meals.  Muscle biopsy showed type II muscle atrophy, which is usually the result of other chronic illness resulting in muscle wasting.  HD today.     6/20: Intake varies, 25-75%. BG inconsistent as a result, nsg having to hold insulin. Muscle bx done 6/15 on thigh. Awaiting results. Dislikes fluid restriction.    6/13: Intake 50-75% most meals. Didn't care for lunch today. Glucoses are variable. To have MRI of T-spine and muscle biopsy Wednesday.     6/6: PO %. Drinking Boost GC at times. Glu still elevated - in the 300's earlier today. Still has pain in L thigh.     5/30: Left thigh edema and LE edema (mild) noted. Steroids started which have helped with burning sensation/pain. BG have been elevated. Intake has been variable.     5/23: Having some pain issues in L groin area. MD ordered xray to check left hip for bony changes. Intake/appetite just fair. Continues on HD. Oliguric. Plan to add in nova renal shakes daily given suboptimal intake         Factors Affecting Intake decreased appetite, dislikes hospital food, pain issues, weakness     Current Nutrition Orders & Evaluation of Intake       Oral Nutrition     Food Allergies NKFA   Current PO Diet Diet Regular; Thin; Consistent Carbohydrate, Low Potassium, Renal, Daily Fluid Restriction; Other; 1,200; 2 gm (50 mEq) + 1500 cc fluid restriction " "  Supplement [facility] Renal , Daily   PO Evaluation     Trending % PO Intake % most meals x 3 days       --  Anthropometrics          Height    Weight Height: 157.5 cm (62.01\")  Weight: 71.1 kg (156 lb 11.2 oz) (07/18/22 0842)    BMI kg/m2 Body mass index is 28.65 kg/m².    Weight trend ..      07/16/22  0633 07/17/22  0549 07/18/22  0842   Weight: 67 kg (147 lb 12.8 oz) 68.6 kg (151 lb 3.8 oz) 71.1 kg (156 lb 11.2 oz)        Physical Findings         Physical Appearance  alert, room air (per nsg)      Edema  lower extremity , 2+ (mild)    Gastrointestinal constipation, non-distended , last bowel movement: 7/17    Tubes/Drains none    Oral/Mouth Cavity WNL    Skin other: L anterior thigh incision from bx; rash on L buttock/lower back noted.      Tests/Procedures/Labs        Tests/Procedures No new tests/procedures        Pertinent Labs     Results from last 7 days   Lab Units 07/15/22  2207 07/13/22  1053 07/12/22  1052 07/11/22  2325   SODIUM mmol/L 134* 129*  --  133*   POTASSIUM mmol/L 4.2 4.6  --  4.2   CHLORIDE mmol/L 98 94*  --  99   CO2 mmol/L 25.0 23.0  --  24.0   BUN mg/dL 15 35*  --  22*   CREATININE mg/dL 2.03* 3.63*  --  2.31*   CALCIUM mg/dL 8.2* 7.7*  --  8.3*   BILIRUBIN mg/dL 0.5  --  0.5 0.5   ALK PHOS U/L 451*  --  551* 571*   ALT (SGPT) U/L 16  --  18 18   AST (SGOT) U/L 36*  --  31 34*   GLUCOSE mg/dL 131* 304*  --  202*     Results from last 7 days   Lab Units 07/15/22  2207 07/13/22  1053   PHOSPHORUS mg/dL  --  4.3   HEMOGLOBIN g/dL 8.5*  --    HEMATOCRIT % 27.4*  --    WBC 10*3/mm3 11.29*  --      Results from last 7 days   Lab Units 07/15/22  2207 07/12/22  1052 07/11/22  2325   PLATELETS 10*3/mm3 187 149 156     COVID19   Date Value Ref Range Status   07/18/2022 Not Detected Not Detected - Ref. Range Final     Lab Results   Component Value Date    HGBA1C 6.60 (H) 06/23/2022          Medications            Scheduled Medications aspirin, 81 mg, Oral, Daily  b complex-vitamin c-folic " acid, 1 tablet, Oral, Daily  carvedilol, 25 mg, Oral, BID With Meals  cloNIDine, 0.3 mg, Oral, Q8H  gabapentin, 100 mg, Oral, Q24H  gabapentin, 300 mg, Oral, BID  hydrALAZINE, 100 mg, Oral, Q8H  insulin glargine, 8 Units, Subcutaneous, QAM  insulin lispro, 0-7 Units, Subcutaneous, TID AC  levothyroxine, 200 mcg, Oral, Q AM  lidocaine, 1 patch, Transdermal, Q24H  losartan, 100 mg, Oral, Q24H  miconazole, 1 application, Topical, Q12H  pantoprazole, 40 mg, Oral, Q AM  predniSONE, 5 mg, Oral, Daily With Breakfast  rOPINIRole, 0.75 mg, Oral, Nightly  sertraline, 150 mg, Oral, Daily  sodium zirconium cyclosilicate, 5 g, Oral, Daily  tamsulosin, 0.4 mg, Oral, Daily        Infusions     --  Intervention Goal        Intervention Goal(s) Reduce/improve symptoms, Disease management/therapy, Tolerate PO , No significant weight loss and PO intake goal %: 75     Nutrition Intervention        RD Action Menu provided, Supplement provided, Encourage intake, Follow Tx Progress and Care plan reviewed     Nutrition Prescription         Diet Prescription    Supplement Prescription    EN Prescription    --  Monitor/Evaluation        Monitor Per protocol     RD to follow up per protocol.    Electronically signed by:  Carol Jennings RD  07/18/22 14:35 EDT

## 2022-07-18 NOTE — PROGRESS NOTES
CC: Debility    SUBJECTIVE:    Patient comfortable with her breathing but complains of feeling like he is coming down with a cold, notes a change in her voice, runny nose.  Reviewed we will check a viral nasopharyngeal swab    She feels a little bit better on prednisone 5 mg but still with the discomfort at the left thigh that she describes as a burning discomfort.  Continues with swelling at the left thigh.  Does not describe any pain elsewhere.    Participating in therapies.    Intermittent straight cath twice daily with volumes 100-200 cc     OBJECTIVE:  Vitals:    07/18/22 0529   BP: 179/73   Pulse: 52   Resp:    Temp:    SpO2:        GENERAL: NAD  MENTAL STATUS: Awake alert  HEENT:  NCAT  CARDIOVASCULAR: Sinus rhythm    CHEST:  hemodialysis access right chest  RESPIRATORY: Normal respirations.   Mild basilar crackles  ABDOMEN: Nondistended.  Normoactive bowel sounds    EXTREMITIES: Left thigh edema .  Edema at the left thigh appears similar..  Also has some edema in the left lower leg as well as right lower leg      No myoclonus.  NEUROLOGIC:    Takes resistance in the bilateral upper extremities, right lower extremity and distal left lower extremity.  Pain inhibition proximally in the left lower extremity but at least antigravity left knee extension      Scheduled Meds:aspirin, 81 mg, Oral, Daily  b complex-vitamin c-folic acid, 1 tablet, Oral, Daily  carvedilol, 25 mg, Oral, BID With Meals  cloNIDine, 0.2 mg, Oral, Q12H  gabapentin, 100 mg, Oral, Q24H  gabapentin, 300 mg, Oral, BID  hydrALAZINE, 100 mg, Oral, Q8H  insulin glargine, 8 Units, Subcutaneous, QAM  insulin lispro, 0-7 Units, Subcutaneous, TID AC  levothyroxine, 200 mcg, Oral, Q AM  lidocaine, 1 patch, Transdermal, Q24H  losartan, 100 mg, Oral, Q24H  miconazole, 1 application, Topical, Q12H  pantoprazole, 40 mg, Oral, Q AM  predniSONE, 5 mg, Oral, Daily With Breakfast  rOPINIRole, 0.75 mg, Oral, Nightly  sertraline, 150 mg, Oral, Daily  sodium  zirconium cyclosilicate, 5 g, Oral, Daily  tamsulosin, 0.4 mg, Oral, Daily      Continuous Infusions:   PRN Meds:.•  acetaminophen  •  dextrose  •  dextrose  •  diphenoxylate-atropine  •  glucagon (human recombinant)  •  heparin (porcine)  •  hydrALAZINE  •  nitroglycerin  •  oxyCODONE  •  oxyCODONE  •  polyethylene glycol  •  sodium chloride  •  zolpidem    Glucose   Date/Time Value Ref Range Status   07/18/2022 0611 212 (H) 70 - 130 mg/dL Final     Comment:     Meter: [id number] : [id number] [facility]   07/18/2022 0152 234 (H) 70 - 130 mg/dL Final     Comment:     Meter: [id number] : [id number] Frank Dunn RN   07/17/2022 2033 268 (H) 70 - 130 mg/dL Final     Comment:     Meter: [id number] : [id number] [facility]   07/17/2022 1623 270 (H) 70 - 130 mg/dL Final     Comment:     Meter: [id number] : [id number] Randolph Melissa    07/17/2022 1054 264 (H) 70 - 130 mg/dL Final     Comment:     Meter: [id number] : [id number] Jose Angel Rod    07/17/2022 0635 243 (H) 70 - 130 mg/dL Final     Comment:     Meter: [id number] : [id number] Chico Rogers    07/16/2022 2018 145 (H) 70 - 130 mg/dL Final     Comment:     Meter: [id number] : [id number] Louieal Sue NA   07/16/2022 1603 160 (H) 70 - 130 mg/dL Final     Comment:     Meter: [id number] : [id number] Otilia SAAVEDRA     Results from last 7 days   Lab Units 07/15/22  2207 07/12/22  1052 07/11/22  2325   WBC 10*3/mm3 11.29* 6.34 7.02   HEMOGLOBIN g/dL 8.5* 8.0* 8.3*   HEMATOCRIT % 27.4* 25.2* 25.9*   PLATELETS 10*3/mm3 187 149 156     Results from last 7 days   Lab Units 07/15/22  2207 07/13/22  1053 07/12/22  1052 07/11/22  2325   SODIUM mmol/L 134* 129*  --  133*   POTASSIUM mmol/L 4.2 4.6  --  4.2   CHLORIDE mmol/L 98 94*  --  99   CO2 mmol/L 25.0 23.0  --  24.0   BUN mg/dL 15 35*  --  22*   CREATININE mg/dL 2.03* 3.63*  --  2.31*   CALCIUM mg/dL 8.2* 7.7*  --  8.3*   BILIRUBIN mg/dL 0.5  --  0.5 0.5   ALK PHOS U/L 451*   --  551* 571*   ALT (SGPT) U/L 16  --  18 18   AST (SGOT) U/L 36*  --  31 34*   GLUCOSE mg/dL 131* 304*  --  202*      Latest Reference Range & Units 05/27/22 11:30   Creatine Kinase 20 - 180 U/L 94   Aldolase 3.3 - 10.3 U/L 5.6       Imaging Results (Last 24 Hours)     ** No results found for the last 24 hours. **          ASSESSMENT AND PLAN    # R MCA s/p TPA with subsequent ICH with debility  Initially held antiplatelet/anticoagulation.  Reviewed with neurology on May 20 and resume aspirin 81 mg daily  SBP goal <160  Recommend outpatient Neurology fu - repeat MRI in 3 months        # DM  June 27-hyperglycemia on steroids.  Lantus increased to 30 units twice daily, Humalog 5 units 3 times daily with meals, and all increase sliding scale coverage  June 28 -hypoglycemic after increasing insulin dosing.  Blood glucose up to 98 prior to lunch, will hold sliding scale insulin and give 5 units scheduled with meal  June 30-hypoglycemia-Lantus twice daily decreased from 35 to 20 units.  Scheduled Humalog with meals discontinued  July 1-prednisone has been discontinued.  Reviewed with internal medicine and Lantus decreased to 10 units twice daily and on discharge home to resume her home regimen of Lantus 10 units daily.  She will check her sugar tonight at home and if elevated give Lantus 10 units tonight and then continue with the Lantus 10 units daily tomorrow.  Reviewed with patient and her  that her sugars may be elevated initially as she just completed prednisone.  They were instructed to call for any additional instructions on adjustment in regimen if it remains elevated at home this weekend  July 6-Lantus 7 units daily  July 7-blood sugar range [phone]- yesterday,  this AM  July 15-Lantus increased to 10 units daily  July 18 - blood glucose increased on Prednisone 5 mg daily     # ESRD   Nephrology following  Inpatient HD    Flomax  Hyperkalemia  July 5-patient had hemodialysis on July 3 and  July 4 for hyperkalemia.  Lokelma resumed by nephrology  July 7-had hemodialysis yesterday and has plans for hemodialysis again today  July 8-hemodialysis again today     Infectious disease-   # s/p catheter infection with MRSA  Vancomycin IV with stop date of May 26  May 24-vancomycin random equal 12.90-on vancomycin 500 mg IV on Hlyruus-Bulnenfl-Fxotxiuu  May 26-to complete course of vancomycin today  #Urinary tract infection-on Dana 3 urinalysis was negative for infection but noted to have leukocytosis increase and repeat urinalysis on June 6 shows findings consistent with urinary tract infection.  Placed on a course of cefdinir renal dose 3 mg daily for 7 days.  No costophrenic angle tenderness patient reviewed with infectious disease service.  Leukocytosis felt related to the bladder infection and not previous catheter infection.  June 8-urine culture results pending.  On cefdinir.  Culture with E. coli, sensitivity pending  June 9 - E coli sensitive to cephalosporins.  June 21 - continues with leukocytosis WBC  15K today. May be from inflammatory process. Steroids added yesterday.   June 22-urine described as milky characteristic, different from previous-recheck urinalysis with culture if indicated  June 23-Labs are still pending today.  Urinalysis also pending as she does not make much urine.  She had a temperature of 100.2 last evening, afebrile this morning.  June 27 - abnormal UA but urine culture from June 24 no growth  July 1-leukocytosis felt related to the noninfectious process in the left thigh as well as secondary to steroids  July 5-WBC trending down to 15.6 today  July 6-WBC trending down to 12 K  July 7-WBC 8.4     #left hydroureteronephrosis-Dana 3-CT scan shows left hydro ureter nephrosis.  She had some previous dilation of the ureter on comparison the past studies but increased today.  Bladder noted to be markedly distended.  Will do in and out cath now and daily to decompress bladder.   Addendum-intermittent cath for 600 cc.  June 4-once daily intermittent cath 450 cc.  June 5-seen by urology-Nguyễn catheter placed with plans to recheck hydroureter on renal ultrasound in 3 to 5 days.  June 6-Nguyễn catheter placed yesterday by urology, with only 170 cc out so far.  Patient reports did not note any change in her left groin pain after the in and out cath with decompression of the bladder..  June 8-reviewed with urology service-request noncontrast CT scan stone protocol to evaluate for resolution of the left hydronephrosis with urology planning to see tomorrow to review the films and then make recommendations, urology would recommend leaving the Nguyễn catheter in for the time being.  June 9 - improved left hydroureter on CT , Nguyễn discontinued.   July 1-to follow-up with urology as an outpatient with follow-up CT planned in August.  She occasionally required intermittent straight cath but this was very infrequent.  Home health nursing to follow.  Continues on Flomax  July 7-intermittent straight cath 400 cc  July 11-She continues with hydroureteronephrosis on the left side on follow-up CT scan-intermittent straight cath volumes have not been over about 400 cc over the past month.  Urology re-consulted for updated assessment  July 12-plan is intermittent straight cath routinely once or twice a day  July 14 - ISC for 150 cc         #Anemia-  EPO.  June 6-hemoglobin 7.8  July 12-hemoglobin 8.0     Lymphadenopathy-evaluated by hematology oncology while here.  No significant change from scans earlier this year.  She is to repeat a scan in 3 months and follow-up with hematology oncology  July 1124-rbkace-uc CT of the abdomen pelvis shows lymphadenopathy overall about the same per her report.  To have follow-up with hematology oncology and repeat scan in another 2 months or so     #Elevated alkaline phosphatase  June 6-elevated alkaline phosphatase 770, up from 289 one week ago, 140 one month ago. Similar  elevation in March, Feb even higher at 1,330 ( intra-abdominal fluid collection, underwent CT-guided aspiration  Revealed blood and cultures did not reveal any growth and therefore antibiotics  discontinued.  Surgery also did evaluate and signed off.), and elevated during admission in January ( She was seen by general surgery who recommended and performed laparoscopic cholecystectomy during that admission).   ALT 70, AST 87, Total bilirubin 0.8. Ca 8.3.  ? If liver source or bone or due to an inflammatory response.  June 7-GGT also elevated.  Seen by gastroenterology.  [place] biliary source.  Liver ultrasound ordered  June 8-liver ultrasound was unremarkable  June 21 - patient declined liver biopsy.   June 27-gastroenterology following peripherally-plans to follow-up as an outpatient and review option of liver biopsy again if alkaline phosphatase remains elevated  June 28-remains elevated at 503  June 30-alkaline phosphatase lower at 426  July 6-trend back up to 634  July 8-alkaline phosphatase unchanged 627  July 12-alkaline phosphatase 551     #Pain - neuropathy BL lower extremity/left thigh pain  Gabapentin/oxycodone.    June 7-patient with lethargy this morning.  May be due to urinary tract infection but with recent increase and gabapentin and oxycodone, will change gabapentin to 200 mg twice a day and decrease oxycodone from 5 back down to 2.5 mg p.o. every 4 hours as needed  June 8-better speed with her processing today  June 21 - pain med regimen recently adjusted with tramadol 25 mg q 4 hours prn, gabapentin 300 g bid, lidoderm patch, oxycodone 2.5 mg q 6 hours prn. Prednisone 60 mg daily added which may help with pain from inflammatory process. Reports pain better today and participated better in therapies.   June 27 - continue present regimen  July 1-home on oxycodone 5 mg p.o. every 4 hours.  Pain dispense #40, gabapentin 300 mg twice daily, Tylenol 500 mg every 6 hours as needed  July 5-increase gabapentin  slightly 300-100-300 mg.  Watch for any myoclonic twitching  July 8-had some lethargy earlier today, patient feels that she is tolerating oxycodone and gabapentin.  Was alert when seen on rounds.        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  Seen by infectious disease service with no acute infectious process noted.  Evaluated by hematology regarding lymphadenopathy, lymph node felt too small to biopsy.  Patient was on a course of low-dose prednisone 10 mg for 3 days then 5 mg for 2 days and then another 10 mg dose with out any significant improvement in the swelling or pain.  She has a history of rheumatoid arthritis.  See CT of the left thigh and MRI of the left thigh and pelvis reports   With lumbar radiculoplexitis and diabetic amyotrophy, on review of the literature do not see edema that she presents with associated with the findings or MRI changes in the tendon and musculature with edema.  There is descriptions of MRI changes in the plexus and peripheral nerve.  In terms of treatment options for diabetic amyotrophy , there have not been definitive clinical trials.  Immunomodulation with steroids has been inconclusive as well as other immunomodulation therapy.  Reviewed with the patient  that  feel that she would benefit from seeing her rheumatologist as an outpatient to evaluate this further, as there does appear to be inflammatory cause given the lymphadenopathy and edema.  Rheumatologist does not come to the hospital.  CPK x2 has been normal.  Aldolase has been normal.  May need to look at  biopsy of left thigh muscle to assess further.   June 8-patient reviewed with neurology yesterday who will assess and also provide input regarding whether muscle biopsy may be helpful.  June 9 - Discussed with Neurology and Rheumatology - plan is for EMG and then muscle biopsy.   Dana 15 - Left quadricep muscle biopsy was completed 6/15, results pending.  Labs: CKs have been normal, ANCA panel 6/11 was unremarkable  June 21 - started on full treatment dose of Prednisone 60 mg daily yesterday pending path results. Reports pain better so far today. Specimen sent to [facility]. Clinical impression presently focal inflammatory myositis pending final pathology results.   June 22-pain continues better today.  June 23-outside pathology report still pending  June 24 - Patient reports left thigh pain better over past couple days but still present. Does not have the soreness at medial thigh as before. Complains of pain at mid-thigh. No change in swelling. Does have discomfort with proximal movement in LLE. Walked 320 feet CTG SBA RW today.   Pain improved on steroid. Discontinued atorvastatin. Biopsy results returned from [facility] - see report -Type 2 fiber atrophy, advanced. Denervation/reinnervation. No evidence of inflammatory myopathy or vasculitis. [facility] lab pursuing a dystrophy panel and will report findings in an addendum.  Reviewed with Neurology - as shown symptomatic response, continue Prednisone 60 mg daily for about one more week, then taper off over month.   June 27 - continue present regimen  June 28-increased pain medication regimen to oxycodone 5 mg every 4 hours as needed for severe pain.   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 Treatment would be aspirin which she is already on. Discussed with Neurology and as been on Prednisone 60 mg for only 1.5 week, will stop and not do taper. Discussed with Internal  Medicine who will adjust insulin.  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  Reviewed with the patient that treatment for this is aspirin which she is already on.  She may do walking activities but would avoid strenuous activities with left quadricep strengthening.  She may strengthen the other 3 extremities as tolerated.  Reviewed may take 3 months to resolve.  July 7-continues with left thigh pain.  She is noted to have increased edema in both lower extremities but particularly the left thigh compared to recent.  Lokelma has been associated with fluid retention which may explain increase edema in part.  Continues on aspirin for suspected diabetic muscular infarct.  No update report yet on additional studies for muscle biopsy.  Initial report was June 24 with additional studies planned  July 11-updated MRI imaging of the left thigh this past weekend.  See report.  She has appointment with rheumatology for further assessment later this week. -will recheck CK level for updated baseline as about 4 weeks out now from her biopsy which could have affected level.  Previous CK levels were not elevated.  White blood cell count has normalized which would hold against any active infection.  Edema at the left thigh appears about the same.  Continues with pain on the left side with pain inhibition with proximal muscles.  July 12-CPK was 39 yesterday.  Patient reviewed with pathologist at [facility] today-no additional results yet-they are to call when have additional results  July 15-appointment with rheumatology this afternoon.  Patient reviewed with rheumatologist earlier today.  No additional records update yet on additional studies muscle biopsy  July 16 - Reviewed with Rheum today. Thought remains diabetic muscle infarct, continue ASA.  Start Prednisone 5mg to see if rheumatoid arthritis contributing to some  pain and then f/up with Rheum outpatient for biologic agent.    # HTN   Coreg  Clonidine  Hydralazine  Losartan  Nephrology/internal medicine following     #Hypothyroidism  Levothyroxine  June 9 - recheck TSH- addendum Dana 10- On Nov 30, 2021 , on Dec 2, 2021 T4 = 0.90, on Dec 9, T4=1.68. On May 7, TSH = 134. Has been on Levothyroxine 125 mcg/day it appears since at least Dec 13, 2021. See Dr Templeton's discharge note from Dec 17,2021 which discusses thyroid labs/dosing at that time. On June 9, 2022 TSH = 54.4.   Check free T4. Will get evaluation from Internal Medicine regarding thyroid studies/levothyroxine dosing.  June 11 - levothyroxine increased to 150mcg daily-continue this dose and she will need follow up TSH in 4-6 weeks from June 11 as an outpatient  July 11-repeat TSH and T4 ordered for Monday, July 11 July 12-TSH still elevated, higher at 96.  Levothyroxine increased to 200 mcg daily and separate from other meds.  Free T4 equal 0.68     #CAD  ASA held in setting of ICH - restart aspirin 81 mg daily on May 20, 2022 per Neurology     #Depression   Sertraline  June 28-sertraline dose increased  July 5-reviewed with psychiatry service-continue present regimen    Insomnia-improved on Ambien 5 mg  July 6-fatigue during the day-decrease Ambien 2.5 mg nightly and assess response  July 7-tolerated Ambien 2.5    #GERD   PPI     #Restless leg syndrome  Requip     #DVT prophylaxis-SCDs ordered but patient refusing.  Per neurology note May 11-continue off anticoagulation/antiplatelet for now. Restarted ASA 81 mg on May 20.  May 16-reviewed with patient and try venous foot compression devices  May 20-also not able to tolerate venous foot compression devices.  May 25-bilateral lower extremity venous duplex negative for DVT  July 9-  venous duplex negative for DVT left lower extremity    Diarrhea-July 7-loose stool x5 over the last day.  Lokelma is not associated with diarrhea.  C. difficile was negative on July  5.  Was on cefdinir from June 7 to June 13 and cefazolin one-time dose on Dana 15.  - will recheck C. Difficile  July 11-C. difficile was negative x2.  Diarrhea resolved        -  comprehensive inpatient rehabilitation program working with PT, OT, SLP for a minimum of three hours per day, five days per week. - will monitor for progress     TEAM CONF - MAY 17- BED SBA. TRANSFER MIN. 4 STAIRS MIN. GAIT 160 FEET CTG RW. SLOW TO PROCESS. BATH MIN. LBD MIN. UBD MIN. GROOMING SET UP. TOILETING MIN . COGNITION OVERALL MILD DEFICITS. VISUAL IMPAIRMENT IMPACTS SOME TESTING. ESRD-HD. ANTIBIOTICS - VANCOMYCIN 10.90 YESTERDAY.  ELOS - 1-2 WEEKS.      TEAM CONF - MAY 24 - ALWAYS SO PROCESSING AFTER EACH DIALYSIS SESSION. AT HOME WOULD GO BACK TO HOME AND SLEEP. BED MIN ASSIST. TRANSFERS MIN. GAIT 80   FEET RW CTG. 4 STAIRS CTG. TOILET TRASNFERS AND SHOWER TRANSFERS MIN CTG. BATH CTG. LBD CTG. UBD SET UP. GROOMING SET UP. TOILETING CTG.   LEFT GROIN - ADDUCTOR PAIN - There is mild joint space narrowing involving both hips symmetrically. There are also some minimal degenerative changes involving both hips. The overall appearance is similar to the study of 12/16/2021. MODERATE WORD RETRIEVAL DEFICITS. IMPAIRED VISION AFFECTS HOME MANAGEMENT TASKS. HYPOGLLYCEMIA AFTER SSI .DECREASED TO 0-7 UNITS.   ELOS - ONE WEEK.         TEAM CONF - MAY 31 - TRANSFERS CTG. GAIT 40- FEET CTG RW LIMITED BY THIGH PAIN. TOILET TRANSFERS CTG. BATH CTG. LBD CTG. UBD SET UP. TOILETING CTG.  GROOMING SET UP. COGNITION - MODERATE WORD RETRIEVAL DEFICITS, MODERATE IMPAIRED MEMORY IMPACTED BY FATIGUE, STILL SLOW PROCESSING.  BNE (Active)  Att'n. - Mildly Imp.  Exec. Fx. - Mildly Imp., slowed processing speed  Rsng/Jgmnt - WNL  Arith - Mod Imp.  Visuospatial Skills - DNA, pt declined citing poor vision  Visual Mem. DNA  Verbal Mem. - WNL  Emot - Pt endorsed mild dysphoria and anxiousness secodnary to current inpatient  Stay.  CONTINENT BOWEL.  OCCASIONAL VOID. ESRD-HD. ON INSULIN. SKIN INTACT. LIDODERM PATCH TO LEFT ADDUCTOR TENDON. COURSE OF STEROID FOR LEFT THIGH JEREMI. CONSULTED ORTHO FOR INPUT.  ELOS - POSSIBLY Thursday DEPENDING ON FURTHER EVALUATIONS.            TEAM CONF - JUNE 21 -  DECLINED THERAPY DUE TO PAIN.  AT MOST RECENT THERAPY WHEN PARTICIPATED, TRANSFERS MIN. GAIT 80 FEET MIN / CTG MIN ASSIST RW. TOILET TRANSFERS CTG. TOILETING CTG. MODERATE IMPAIRED VERBAL MEMORY. PAIN MANAGEMENT - MEDS ADJUSTED - OXYCODONE 2.5 MG Q 6 HOURS PRN, TRAMADOL 25 MG Q 4 HOURS. MUSCLE BIOPSY RESULTS PENDING. STARTING ON PREDNISONE 60 MG DAILY YESTERDAY. WILL NEED TO ADJUST INSULING, BLOOD GLUCOSE > 300 THIS AM. GASTROENTEROLOGY EVALUATING LIVER. PATIENT DECLINES LIVER BIOPSY.  HYPOTHYROID - levothyroxine increased to 150mcg daily-continue this dose and she will need follow up TSH in 4-6 weeks from June 11 as an outpatient  ELOS -   1.5 WEEKS     TEAM CONF - June 28 - BED MIN  SIT TO SUPINE, SBA SUPINE TO SIT. CAR TRANSFERS CTG. TRANSFERS MIN ASSIST. 4 STAIRS MIN. GAIT 240 FEET CTG SBA. TOILET TRANSFERS MIN. UBB SBA. LBB MIN WITH LLE. UBD SET UP. LBD MOD ASSIST LLE.   INSULIN ADJUSTED FOR ELEVATED BLOOD GLUCOSE ON STEROIDS. ON PREDNISONE 60 MG DAILY AND PLAN TO TAPER OVER NEXT MONTH . BIOPSY REPORT SENT TO OUTPATIENT RHEUMATOLOGIST YESTERDAY. ADDITIONAL STUDIES ON BIOPSY PENDING.   BLADDER SCAN 400 CC BUT ONLY 200 CC ON INTERMITTENT STRAIGHT CATH. LEFT QUAD MUSCLE BIOPSY SITE HEALING.  ESRD - HD.   ELOS - Thursday WITH HOME HEALTH PT, OT, AND NURSING FOR DIABETES AND MONITORING ON STEROIDS.   Addendum-was not able to do car transfer after hemodialysis on Friday, July 1 and continued with inpatient rehab course.    TEAM CONF - July 12 - BED MOD. TRASNFER MIN, AT TIMES MIN-MOD. GAIT CTG RW 60 FEET. LAST WORKED ON STAIRS July 2 4 STAIRS MIN ASSIST. TOILET TRANSFERS MIN. UBB SBA. LBB MIN MOD. UBD SET UP. LBD MOD MAX. OXYCODONE AND GABAPENTIN FOR PAIN. LEFT  HYDROURETERNEPHROSIS. - CATHETERIZE ONCE A DAY. ON FLOMAX. HAS A RASH ON BACK/BUTTOCK - MICONAZOLE CREAM. APPOINTMENT WITH DR Cortez - RHEUMATOLOGY - ON Friday July 15. CK = 39 YESTERDAY. THYROID RECHECK - TSH 96.1 AND FREE T4 0.68 YESTERDAY - INTERNAL MEDICINE FOLLOWING.   [facility] - LOOKING AT SUBACUTE OPTIONS.     Rehabilitation-July 7-Patient has not been able to participate in therapy for 3 hours a day.  Will ask internal medicine to assess for transfer to the acute care wing in the hospital  July 8-Was evaluated by internal medicine service who did not feel that she needed transfer to the acute [facility]    Adonis Florentino MD       During rounds, used appropriate personal protective equipment including mask and gloves.  Additional gown if indicated.  Mask used was standard procedure mask. Appropriate PPE was worn during the entire visit.  Hand hygiene was completed before and after.

## 2022-07-18 NOTE — PROGRESS NOTES
Inpatient Rehabilitation Plan of Care Note    Plan of Care  Care Plan Reviewed - Updates as Follows    Body Function Structure    [RN] Skin Integrity(Active)  Current Status(07/18/2022): Rash to back and lower buttocks.  Weekly Goal(07/21/2022): Rash to resolve.  Discharge Goal: No skin problem.    Performed Intervention(s)  Ointment per MD order      Body Systems    [RN] Genitourinary(Active)  Current Status(07/18/2022): Patient is a M,W,F HD patient with a tunneled  catheter to the R chest. Pt produces little urine.  pt oliguric. New cath order  6/20: straight cath daily and prn until cath residual < 300 ml x3 days.  Weekly Goal(07/22/2022): Pt will plan to transition to community dialysis.  Discharge Goal: Pt will transition to community dialysis.    [RN] Endocrine(Active)  Current Status(07/18/2022): Patient is a diabetic.  Weekly Goal(07/21/2022): Blood glucose will be controlled.  Discharge Goal: Patient will have medication regimen that she can manage at  home.    Performed Intervention(s)  Dialysis mwf  Monitor weight and I/O.  DM educator consult.  Accuchecks as ordered.      Pain    [RN] Pain Management(Active)  Current Status(07/18/2022): Pt has consistent pain to L groin/L medial upper  thigh . Muscle biopsy site to L thigh .  Weekly Goal(07/21/2022): Pain will be controlled.  Discharge Goal: Pt will have pain management regimen that she can follow at  home.    Performed Intervention(s)  Pain meds prn .  Ice to LLE per order      Psychosocial    [RN] Coping/Adjustment(Active)  Current Status(07/18/2022): Pt is A/Ox4. Pt is at increased risk for reduced  coping r/t hospitalization and comorbidities. Pt has been calling for assistance  appropriately as needed. Pt has supportive family.  Weekly Goal(07/22/2022): Pt will verbalize needs, feelings, concerns to staff as  needed.  Discharge Goal: Pt will verbalize adequate coping strategies to use at home.    Performed Intervention(s)  Provide distraction  and/or relaxation as needed.  Allow extra time for patient to verbalize needs, concerns, feelings, etc.      Safety    [RN] Potential for Injury(Active)  Current Status(07/18/2022): Patient is at increased risk for falls/injury r/t  hospitalization and generalized weakness.  Weekly Goal(07/21/2022): Patient will call appropriately for assistance as  needed.  Discharge Goal: Patient will remain free from falls/injury.    Performed Intervention(s)  Fall precautions: bed low and locked, chair alarm and bed alarms in use, nonskid  socks and gait belt in use, call light and personal belongings within reach.  Hourly rounding.      Sphincter Control    [RN] Bowel and bladder (Active)  Current Status(07/18/2022): Patient can be continent and is often incontinent of  stool. Patient is oliguric and is an HD patient. New cath order 6/20.  Weekly Goal(07/22/2022): Pt will be continent 75% of the time. Patient will have  BM at least q3 days or within normal patterns.  Discharge Goal: Patient will be continent 100% of the time.    Performed Intervention(s)  Monitor I/O.  Bowel meds prn.    Signed by: Marah Akhtar RN

## 2022-07-18 NOTE — THERAPY TREATMENT NOTE
Inpatient Rehabilitation - Physical Therapy Treatment Note       [facility]     Patient Name: Zaina Martinez  : 1965  MRN: [id number]    Today's Date: 2022                    Admit Date: 2022      Visit Dx:     ICD-10-CM ICD-9-CM   1. Generalized weakness  R53.1 780.79   2. 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 Other insomnia  G47.09 780.52       Patient Active Problem List   Diagnosis   • Renal insufficiency   • Hypertensive disorder   • Hypothyroidism   • Type 2 diabetes mellitus with kidney complication, with long-term current use of insulin ([facility])   • Rheumatoid arthritis ([facility])   • Angioedema   • Esophageal dysmotility   • Anemia   • Medically noncompliant   • Myocardial infarction due to demand ischemia ([facility])   • Enteritis   • PRES (posterior reversible encephalopathy syndrome)   • Urine retention   • Klebsiella infection   • Superficial thrombophlebitis   • Generalized weakness   • ESRD (end stage renal disease) ([facility])   • CAD (coronary artery disease)   • Abnormal urinalysis   • Chronic diastolic CHF (congestive heart failure) ([facility])   • Pyelonephritis   • Calculus of gallbladder with acute on chronic cholecystitis without obstruction   • Pleural effusion on right   • Anemia due to chronic kidney disease, on chronic dialysis ([facility])   • Abnormal findings on diagnostic imaging of other specified body structures   • Acute upper respiratory infection   • Agitation   • Alkaline phosphatase raised   • Casts present in urine   • Cellulitis of toe   • Hip pain   • Community acquired pneumonia   • Depressive disorder   • Diarrhea of presumed infectious origin   • Difficult or painful urination   • Disease due to severe acute respiratory syndrome coronavirus 2 (SARS-CoV-2)   • Dyspnea   • Encounter for follow-up examination after completed treatment for conditions other than malignant neoplasm   • H/O: hypothyroidism   • Hyperlipidemia   • Hypomagnesemia   • Intractable  vomiting with nausea   • Leukocytosis   • Luetscher's syndrome   • Need for influenza vaccination   • Restless legs   • Noncompliance with treatment   • Shoulder pain   • Urinary tract infectious disease   • Metabolic encephalopathy   • Abnormal findings on diagnostic imaging of abdomen   • Status post cholecystectomy   • Hyponatremia   • Leukocytosis   • Acute metabolic encephalopathy   • Encephalopathy, toxic   • Acute CVA (cerebrovascular accident) ([facility])   • Intracranial hemorrhage ([facility])   • Stroke (H[facility])   • Abnormal urinalysis   • Diabetic muscle infarction ([facility])   • Other insomnia       Past Medical History:   Diagnosis Date   • Acute CVA (cerebrovascular accident) (HCC) 5/1/2022   • Acute on chronic diastolic CHF (congestive heart failure) (HCC)    • CAD (coronary artery disease) 12/20/2021   • Diabetes (HCC)    • Disease of thyroid gland    • GERD (gastroesophageal reflux disease)    • Hyperlipidemia 11/30/2018   • Hypertension    • Rheumatoid arthritis (HCC)        Past Surgical History:   Procedure Laterality Date   • CHOLECYSTECTOMY WITH INTRAOPERATIVE CHOLANGIOGRAM N/A 1/10/2022    Procedure: Laparoscopic cholecystectomy with intraoperative cholangiogram;  Surgeon: Ramana Raygoza MD;  Location: [facility];  Service: General;  Laterality: N/A;   • EYE SURGERY     • HYSTERECTOMY     • INSERTION HEMODIALYSIS CATHETER N/A 12/6/2021    Procedure: HEMODIALYSIS CATHETER INSERTION;  Surgeon: Keli Salazar MD;  Location: [facility] 18/19;  Service: Vascular;  Laterality: N/A;   • INSERTION HEMODIALYSIS CATHETER N/A 5/3/2022    Procedure: TUNNELED CATHETER PLACEMENT;  Surgeon: Keli Salazar MD;  Location: [facility];  Service: Vascular;  Laterality: N/A;   • MUSCLE BIOPSY Left 6/15/2022    Procedure: Left quadriceps muscle biopsy;  Surgeon: Marques Ma MD;  Location: [facility];  Service: Neurosurgery;  Laterality: Left;       PT ASSESSMENT (last 12 hours)     IR[facility] PT  Evaluation and Treatment     Row Name 07/18/22 0958          PT Time and Intention    Document Type daily treatment  -DP     Mode of Treatment physical therapy  -DP     Patient/Family/Caregiver Comments/Observations pt up in w/c upon PT arrival. Pt states the glaze in her R eye is getting worse since she has not had her shots.  -DP     Row Name 07/18/22 0958          General Information    Patient Profile Reviewed yes  -DP     Existing Precautions/Restrictions fall;other (see comments)  covid rule out and contact for MRSA  -DP     Row Name 07/18/22 0958          Pain Assessment    Pretreatment Pain Rating 7/10  -DP     Posttreatment Pain Rating 8/10  -DP     Pain Location - Side/Orientation Left  -DP     Pain Location anterior  -DP     Row Name 07/18/22 0958          Cognition/Psychosocial    Affect/Mental Status (Cognition) flat/blunted affect  -DP     Behavioral Issues (Cognition) other (see comments);overwhelmed easily  -DP     Orientation Status (Cognition) oriented x 3  -DP     Follows Commands (Cognition) follows one-step commands  -DP     Personal Safety Interventions safety round/check completed;elopement precautions initiated;fall prevention program maintained;gait belt;muscle strengthening facilitated;supervised activity;nonskid shoes/slippers when out of bed  -DP     Row Name 07/18/22 0958          Bed Mobility    Bed Mobility bed mobility (all) activities;supine-sit;sit-supine  -DP     Rolling Left [place] (Bed Mobility) standby assist  -DP     Rolling Right [place] (Bed Mobility) standby assist  -DP     Scooting/Bridging [place] (Bed Mobility) contact guard  -DP     Supine-Sit [place] (Bed Mobility) moderate assist (50% patient effort)  -DP     Sit-Supine [place] (Bed Mobility) minimum assist (75% patient effort);verbal cues  -DP     Bed Mobility, Safety Issues decreased use of legs for bridging/pushing  -DP     Assistive Device (Bed Mobility) bed rails;head of bed elevated  -DP      Comment, (Bed Mobility) asseed on mat table  -DP     Row Name 07/18/22 0958          Transfer Assessment/Treatment    Transfers sit-stand transfer;stand-sit transfer  -DP     Comment, (Transfers) [place]  -DP     Row Name 07/18/22 0958          Transfers    Bed-Chair [place] (Transfers) minimum assist (75% patient effort)  -DP     Chair-Bed [place] (Transfers) minimum assist (75% patient effort)  -DP     Assistive Device (Bed-Chair Transfers) walker, front-wheeled  -DP     Sit-Stand [place] (Transfers) minimum assist (75% patient effort);moderate assist (50% patient effort)  -DP     Stand-Sit [place] (Transfers) minimum assist (75% patient effort)  -DP     Row Name 07/18/22 0958          Chair-Bed Transfer    Assistive Device (Chair-Bed Transfers) wheelchair;walker, front-wheeled  -DP     Row Name 07/18/22 0958          Sit-Stand Transfer    Assistive Device (Sit-Stand Transfers) walker, front-wheeled  -DP     Row Name 07/18/22 0958          Stand-Sit Transfer    Assistive Device (Stand-Sit Transfers) walker, front-wheeled;wheelchair  -DP     Row Name 07/18/22 0958          Gait/Stairs (Locomotion)    [place] Level (Gait) minimum assist (75% patient effort);1 person to manage equipment  -DP     Assistive Device (Gait) walker, front-wheeled  -DP     Distance in Feet (Gait) 40'x1 in AM  -DP     Pattern (Gait) step-to  -DP     Deviations/Abnormal Patterns (Gait) antalgic;gait speed decreased;kenn decreased;stride length decreased;weight shifting decreased  -DP     Bilateral Gait Deviations heel strike decreased;forward flexed posture  -DP     Comment, (Gait/Stairs) very slow gait and was able to ambulated up to 40' in AM and refused in PM due to pain  -DP     Row Name 07/18/22 0958          Wheelchair Mobility/Management    Method of Wheelchair Locomotion (Mobility) bimanual (upper extremity) propulsion  -DP     Mobility Activities (Wheelchair) steering;turning  -DP     Forward Propulsion  Story (Wheelchair) standby assist  -DP     Steering Story (Wheelchair) standby assist  -DP     Turning Story (Wheelchair) standby assist;minimum assist (75% patient effort)  -DP     Distance Propelled in Feet (Wheelchair) 75  -DP     Comment, Parts Management (Wheelchair) TC for unlocking of brakes and [place] for avoiding objects at times likely due to vision  -DP     Row Name 07/18/22 0958          Safety Issues, Functional Mobility    Impairments Affecting Function (Mobility) balance;endurance/activity tolerance;pain  -DP     Row Name 07/18/22 0958          Motor Skills    Therapeutic Exercise knee;ankle;hip  -DP     Row Name 07/18/22 0958          Hip (Therapeutic Exercise)    Hip (Therapeutic Exercise) isometric exercises  -DP     Hip Isometrics (Therapeutic Exercise) bilateral;gluteal sets;10 repetitions;2 sets;aDduction;other (see comments);3 second hold  adduction was performed with a manual 3 second hold for 10 reps  -DP     Hip Strengthening (Therapeutic Exercise) bilateral;aBduction;yellow;resistance band;10 repetitions  -DP     Row Name 07/18/22 0958          Knee (Therapeutic Exercise)    Knee (Therapeutic Exercise) strengthening exercise  -DP     Knee Strengthening (Therapeutic Exercise) bilateral;SAQ (short arc quad);hamstring curls;yellow;resistance band;10 repetitions;2 sets  -DP     Row Name 07/18/22 0958          Ankle (Therapeutic Exercise)    Ankle (Therapeutic Exercise) strengthening exercise  -DP     Ankle Strengthening (Therapeutic Exercise) bilateral;dorsiflexion;yellow;resistance band;plantarflexion;20 repititions  -DP     Row Name 07/18/22 0958          Positioning and Restraints    Pre-Treatment Position sitting in chair/recliner  -DP     Post Treatment Position wheelchair  -DP     In Wheelchair sitting;call light within reach;encouraged to call for assist;exit alarm on  -DP           User Key  (r) = Recorded By, (t) = Taken By, (c) = Cosigned By    Initials Name  Provider Type    Jay Rios, VICKIE Physical Therapist              Wound 06/15/22 1312 Left anterior thigh Incision (Active)   Closure Liquid skin adhesive 07/17/22 2028   Base dry;clean 07/17/22 2028   Drainage Amount none 07/18/22 0819   Dressing Care open to air 07/18/22 0819     Physical Therapy Education                 Title: PT OT SLP Therapies (In Progress)     Topic: Physical Therapy (In Progress)     Point: Mobility training (Done)     Learning Progress Summary           Patient Acceptance, E,D, VU,DU by DP at 7/18/2022 1550   Family Acceptance, E,D, VU,NR by LB at 7/13/2022 1456    Comment: Stairs, (dgt practiced). 2 Helpers for stairs used.  Attempted car tsf to dgts car. Unable to perform as the SUV was too high for pnt. A platform step was offered for practice, but pnt declined.  Pnt refused amb during fam teaching, 2ary to L thigh pain.   Significant Other Acceptance, E, VU by LB at 7/15/2022 1556    Comment: car tsf      Show all documentation for this point (41)                 Point: Home exercise program (In Progress)     Learning Progress Summary           Patient Acceptance, E,TB, NR by MS at 7/11/2022 1434      Show all documentation for this point (13)                 Point: Body mechanics (Done)     Learning Progress Summary           Patient Acceptance, E,D, VU,DU by DP at 7/18/2022 1550   Family Acceptance, E, VU by LB at 7/13/2022 1504      Show all documentation for this point (9)                 Point: Precautions (Done)     Learning Progress Summary           Patient Acceptance, E,D, VU,DU by DP at 7/18/2022 1550      Show all documentation for this point (9)                             User Key     Initials Effective Dates Name Provider Type Discipline    LB 03/07/18 -  Liz Rodriguez, PTA Physical Therapist Assistant PT    MS 06/16/21 -  Teagan Santos PT Physical Therapist PT    DP 08/24/21 -  Jay Jim PT Physical Therapist PT                PT Recommendation and  Plan                          Time Calculation:      PT Charges     Row Name 07/18/22 1550             Time Calculation    Start Time 0930  -DP      Stop Time 1030  -DP      Time Calculation (min) 60 min  -DP      PT Received On 07/18/22  -DP      PT - Next Appointment 07/19/22  -DP              Time Calculation- PT    Total Timed Code Minutes- PT 60 minute(s)  -DP            User Key  (r) = Recorded By, (t) = Taken By, (c) = Cosigned By    Initials Name Provider Type    DP Jay Jim, PT Physical Therapist                Therapy Charges for Today     Code Description Service Date Service Provider Modifiers Qty    [id number]  PT THER PROC EA 15 MIN 7/18/2022 Jay Jim PT GP 4    [id number]  PT THERAPEUTIC ACT EA 15 MIN 7/18/2022 Jay Jim PT GP 2        Patient was wearing a face mask during this therapy encounter. Therapist used appropriate personal protective equipment including gown, eye protection, mask and gloves.  Mask used was N95/duckbill. Appropriate PPE was worn during the entire therapy session. Hand hygiene was completed before and after therapy session. Patient is in enhanced droplet precautions.       PT G-Codes  AM-PAC 6 Clicks Score (PT): 17      Jay Jim PT  7/18/2022

## 2022-07-18 NOTE — THERAPY TREATMENT NOTE
Inpatient Rehabilitation - Occupational Therapy Treatment Note    [facility]     Patient Name: Zaina Martinez  : 1965  MRN: [id number]    Today's Date: 2022                 Admit Date: 2022         ICD-10-CM ICD-9-CM   1. Generalized weakness  R53.1 780.79   2. Diabetic muscle infarction ([facility])  E11.69 250.80    M62.20 728.89   3. Other insomnia  G47.09 780.52       Patient Active Problem List   Diagnosis   • Renal insufficiency   • Hypertensive disorder   • Hypothyroidism   • Type 2 diabetes mellitus with kidney complication, with long-term current use of insulin ([facility])   • Rheumatoid arthritis ([facility])   • Angioedema   • Esophageal dysmotility   • Anemia   • Medically noncompliant   • Myocardial infarction due to demand ischemia ([facility])   • Enteritis   • PRES (posterior reversible encephalopathy syndrome)   • Urine retention   • Klebsiella infection   • Superficial thrombophlebitis   • Generalized weakness   • ESRD (end stage renal disease) ([facility])   • CAD (coronary artery disease)   • Abnormal urinalysis   • Chronic diastolic CHF (congestive heart failure) ([facility])   • Pyelonephritis   • Calculus of gallbladder with acute on chronic cholecystitis without obstruction   • Pleural effusion on right   • Anemia due to chronic kidney disease, on chronic dialysis ([facility])   • Abnormal findings on diagnostic imaging of other specified body structures   • Acute upper respiratory infection   • Agitation   • Alkaline phosphatase raised   • Casts present in urine   • Cellulitis of toe   • Hip pain   • Community acquired pneumonia   • Depressive disorder   • Diarrhea of presumed infectious origin   • Difficult or painful urination   • Disease due to severe acute respiratory syndrome coronavirus 2 (SARS-CoV-2)   • Dyspnea   • Encounter for follow-up examination after completed treatment for conditions other than malignant neoplasm   • H/O: hypothyroidism   • Hyperlipidemia   • Hypomagnesemia   • Intractable vomiting with  nausea   • Leukocytosis   • Luetscher's syndrome   • Need for influenza vaccination   • Restless legs   • Noncompliance with treatment   • Shoulder pain   • Urinary tract infectious disease   • Metabolic encephalopathy   • Abnormal findings on diagnostic imaging of abdomen   • Status post cholecystectomy   • Hyponatremia   • Leukocytosis   • Acute metabolic encephalopathy   • Encephalopathy, toxic   • Acute CVA (cerebrovascular accident) (HCC)   • Intracranial hemorrhage (HCC)   • Stroke (HCC)   • Abnormal urinalysis   • Diabetic muscle infarction (HCC)   • Other insomnia       Past Medical History:   Diagnosis Date   • Acute CVA (cerebrovascular accident) (HCC) 5/1/2022   • Acute on chronic diastolic CHF (congestive heart failure) (HCC)    • CAD (coronary artery disease) 12/20/2021   • Diabetes (HCC)    • Disease of thyroid gland    • GERD (gastroesophageal reflux disease)    • Hyperlipidemia 11/30/2018   • Hypertension    • Rheumatoid arthritis (HCC)        Past Surgical History:   Procedure Laterality Date   • CHOLECYSTECTOMY WITH INTRAOPERATIVE CHOLANGIOGRAM N/A 1/10/2022    Procedure: Laparoscopic cholecystectomy with intraoperative cholangiogram;  Surgeon: Ramana Raygoza MD;  Location: [facility];  Service: General;  Laterality: N/A;   • EYE SURGERY     • HYSTERECTOMY     • INSERTION HEMODIALYSIS CATHETER N/A 12/6/2021    Procedure: HEMODIALYSIS CATHETER INSERTION;  Surgeon: Keli Salazar MD;  Location: [facility] 18/19;  Service: Vascular;  Laterality: N/A;   • INSERTION HEMODIALYSIS CATHETER N/A 5/3/2022    Procedure: TUNNELED CATHETER PLACEMENT;  Surgeon: Keli Salazar MD;  Location: [facility] OR;  Service: Vascular;  Laterality: N/A;   • MUSCLE BIOPSY Left 6/15/2022    Procedure: Left quadriceps muscle biopsy;  Surgeon: Marques Ma MD;  Location: [facility] OR;  Service: Neurosurgery;  Laterality: Left;             IRF OT ASSESSMENT FLOWSHEET (last 12 hours)     IRF OT  Evaluation and Treatment     [facility] Name 07/18/22 1524          OT Time and Intention    Document Type daily treatment  -AF     Mode of Treatment occupational therapy  -AF     Patient Effort good  -AF     Row Name 07/18/22 1524          General Information    Patient/Family/Caregiver Comments/Observations pt supine in bed in AM, in COVID rule out in PM session seen in room  -AF     Existing Precautions/Restrictions fall  -AF     Row Name 07/18/22 1524          Pain Assessment    Pretreatment Pain Rating 7/10  -AF     Posttreatment Pain Rating 7/10  -AF     Pain Location - Side/Orientation Left  -AF     Pain Location --  LE  -AF     Row Name 07/18/22 1524          Cognition/Psychosocial    Affect/Mental Status (Cognition) flat/blunted affect  -AF     Orientation Status (Cognition) oriented x 3  -AF     Follows Commands (Cognition) follows one-step commands  -AF     Personal Safety Interventions fall prevention program maintained;gait belt;nonskid shoes/slippers when out of bed  -AF     Row Name 07/18/22 1524          Bathing    [place] Level (Bathing) bathing skills;upper body;minimum assist (75% patient effort);lower body;moderate assist (50% patient effort);verbal cues  -AF     Assistive Device (Bathing) grab bar/tub rail;hand held shower spray hose;shower chair  -AF     Position (Bathing) sink side;supported sitting;supported standing  -AF     Comment (Bathing) w/c level  -AF     Row Name 07/18/22 1524          Upper Body Dressing    [place] Level (Upper Body Dressing) upper body dressing skills;set up assistance  -AF     Position (Upper Body Dressing) supported sitting  -AF     Set-up Assistance (Upper Body Dressing) obtain clothing  -AF     Comment (Upper Body Dressing) w/c level  -AF     Row Name 07/18/22 1524          Lower Body Dressing    [place] Level (Lower Body Dressing) don;pants/bottoms;shoes/slippers;maximum assist (25% patient effort);verbal cues  -AF     Position (Lower Body Dressing)  supported sitting;supported standing  -AF     Set-up Assistance (Lower Body Dressing) obtain clothing  -AF     Row Name 07/18/22 1524          Grooming    [place] Level (Grooming) grooming skills;set up  -AF     Position (Grooming) supported sitting  -AF     Set-up Assistance (Grooming) obtain supplies  -AF     Comment (Grooming) w/c level  -AF     Row Name 07/18/22 1524          Toileting    Comment (Toileting) changed brief  -AF     Row Name 07/18/22 1524          Bed Mobility    Supine-Sit [place] (Bed Mobility) contact guard  -AF     Sit-Supine [place] (Bed Mobility) contact guard;standby assist  -AF     Assistive Device (Bed Mobility) bed rails  -AF     Row Name 07/18/22 1524          Transfer Assessment/Treatment    Comment, (Transfers) sit to stand from EOB to standing scale with CGA  -AF     Row Name 07/18/22 1524          Transfers    Bed-Chair [place] (Transfers) minimum assist (75% patient effort);verbal cues  -AF     Chair-Bed [place] (Transfers) minimum assist (75% patient effort);verbal cues  -AF     Assistive Device (Bed-Chair Transfers) wheelchair  -AF     Row Name 07/18/22 1524          Chair-Bed Transfer    Assistive Device (Chair-Bed Transfers) wheelchair  -AF     Row Name 07/18/22 1524          Shoulder (Therapeutic Exercise)    Shoulder Strengthening (Therapeutic Exercise) bilateral;scapular stabilization;sitting;2 lb free weight;15 repititions;2 sets  -AF     Row Name 07/18/22 1524          Elbow/Forearm (Therapeutic Exercise)    Elbow/Forearm Strengthening (Therapeutic Exercise) bilateral;flexion;extension;supination;pronation;sitting;2 lb free weight;15 repititions;2 sets  -AF     Row Name 07/18/22 1524          Hand (Therapeutic Exercise)    Hand Strengthening (Therapeutic Exercise) bilateral; strengthening;15 repititions;3 sets  -AF     Row Name 07/18/22 1524          Balance    Static Sitting Balance independent  -AF     Static Standing Balance contact guard   -AF     Row Name 07/18/22 1524          Positioning and Restraints    Pre-Treatment Position in bed  -AF     Post Treatment Position bed  -AF     In Bed supine;call light within reach;encouraged to call for assist;exit alarm on  in PM  -AF     In Wheelchair sitting;exit alarm on;with PT  in AM  -AF           User Key  (r) = Recorded By, (t) = Taken By, (c) = Cosigned By    Initials Name Effective Dates    AF Tasha Helm, OTR 06/16/21 -                  Occupational Therapy Education                 Title: PT OT SLP Therapies (In Progress)     Topic: Occupational Therapy (Done)     Point: ADL training (Done)     Description:   Instruct learner(s) on proper safety adaptation and remediation techniques during self care or transfers.   Instruct in proper use of assistive devices.              Learning Progress Summary           Patient Acceptance, E,TB,D, VU,NR by  at 6/25/2022 1434      Show all documentation for this point (2)                 Point: Home exercise program (Done)     Description:   Instruct learner(s) on appropriate technique for monitoring, assisting and/or progressing therapeutic exercises/activities.              Learning Progress Summary           Patient Acceptance, E,D,H, VU by  at 6/30/2022 1544    Comment: review of HEP with hand weights and theraband      Show all documentation for this point (2)                 Point: Precautions (Done)     Description:   Instruct learner(s) on prescribed precautions during self-care and functional transfers.              Learning Progress Summary           Patient Acceptance, E,TB,D, VU,NR by  at 6/25/2022 1434      Show all documentation for this point (2)                 Point: Body mechanics (Done)     Description:   Instruct learner(s) on proper positioning and spine alignment during self-care, functional mobility activities and/or exercises.              Learning Progress Summary           Patient Acceptance, E,TB,D, VU,NR by  at 6/25/2022  1434      Show all documentation for this point (2)                             User Key     Initials Effective Dates Name Provider Type Discipline    JS 06/16/21 -  Fany Lima, PT Physical Therapist PT    AF 06/16/21 -  Tasha Helm OTJANICE Occupational Therapist OT                    OT Recommendation and Plan                         Time Calculation:      Time Calculation- OT     Row Name 07/18/22 1529 07/18/22 1528          Time Calculation- OT    OT Start Time 1300  -AF 0830  -AF     OT Stop Time 1330  -AF 0930  -AF     OT Time Calculation (min) 30 min  -AF 60 min  -AF           User Key  (r) = Recorded By, (t) = Taken By, (c) = Cosigned By    Initials Name Provider Type    AF Tasha Helm, OTR Occupational Therapist              Therapy Charges for Today     Code Description Service Date Service Provider Modifiers Qty    47497047818 HC OT SELF CARE/MGMT/TRAIN EA 15 MIN 7/18/2022 Tasha Helm OTR GO 3    43888632045 HC OT THER PROC EA 15 MIN 7/18/2022 Tasha Helm OTJANICE GO 3                   RAMO Mike  7/18/2022

## 2022-07-18 NOTE — PLAN OF CARE
Goal Outcome Evaluation:  Plan of Care Reviewed With: patient           Outcome Evaluation: Zaina is alert and oriented x4, takes medication with water, and pain medication given twice, so far. She is assist x1, accuccheck AC/HS- coverage needed at lunch, and daily weight. BP elevated- hold hydralazine on dialysis days and I/C over 300cc. Dialysis M/W/F- will be administered in room today. She complained of runny nose and sore throat- covid panel completed with negative results. No safety issues and able to tolerate all therapies, although some therapies held in room due to isolation precautions.

## 2022-07-18 NOTE — PROGRESS NOTES
Case Management  Inpatient Rehabilitation Plan of Care and Discharge Plan Note    Rehabilitation Diagnosis:  Branch  Date of Onset:  Branch    Medical Summary:  Branch  Past Medical History: Branch    Plan of Care  Updated Problems/Interventions  Field    Expected Intensity:  Branch  Interdisciplinary Team:  Cassandra  Estimated Length of Stay/Anticipated Discharge Date: Branch  Anticipated Discharge Destination:  Anticipated discharge destination from inpatient rehabilitation is community  discharge with assistance. Discharge plan is now to sub acute facility.  Referrals made to several facitlites but no beds available. Will continue search  for sub acute facility that can accept patient.      Based on the patient's medical and functional status, their prognosis and  expected level of functional improvement is:  Cassandra    Signed by: STACY Fragoso

## 2022-07-18 NOTE — PROGRESS NOTES
[facility] Progress Note      Patient Name: Zaina Martinez  : 1965  MRN: [id number]  Primary Care Physician:  Karson Oreilly MD  Date of admission: 2022    Subjective     Interval History:   Follow up ESRD. .     Seen and examined.  Blood pressure above goal.  No shortness of air or chest pain.    Review of Systems:   Denies chest pain or shortness of breath    Objective     Vitals:   Temp:  [97.7 °F (36.5 °C)-97.8 °F (36.6 °C)] 97.8 °F (36.6 °C)  Heart Rate:  [50-53] 50  Resp:  [16-18] 16  BP: (173-189)/(73-85) 173/74    Intake/Output Summary (Last 24 hours) at 2022 1430  Last data filed at 2022 0808  Gross per 24 hour   Intake 598 ml   Output 237 ml   Net 361 ml     Seen on dialysis    Physical Exam:   General:  in bed  Conversant.    HEENT: oral mucosa moist.   Neck:RIJ TDC  Lungs:clear to auscultation. Unlabored.    Heart:RRR no s3 or rub. 2 /6 early syst m  Abdomen: + bs,soft, nontender   Extremities:1+ -2+ lower ext edema.    Neuro: speech more fluent . Moves all 4 ext.     Scheduled Meds:     aspirin, 81 mg, Oral, Daily  b complex-vitamin c-folic acid, 1 tablet, Oral, Daily  carvedilol, 25 mg, Oral, BID With Meals  cloNIDine, 0.2 mg, Oral, Q12H  gabapentin, 100 mg, Oral, Q24H  gabapentin, 300 mg, Oral, BID  hydrALAZINE, 100 mg, Oral, Q8H  insulin glargine, 8 Units, Subcutaneous, QAM  insulin lispro, 0-7 Units, Subcutaneous, TID AC  levothyroxine, 200 mcg, Oral, Q AM  lidocaine, 1 patch, Transdermal, Q24H  losartan, 100 mg, Oral, Q24H  miconazole, 1 application, Topical, Q12H  pantoprazole, 40 mg, Oral, Q AM  predniSONE, 5 mg, Oral, Daily With Breakfast  rOPINIRole, 0.75 mg, Oral, Nightly  sertraline, 150 mg, Oral, Daily  sodium zirconium cyclosilicate, 5 g, Oral, Daily  tamsulosin, 0.4 mg, Oral, Daily      IV Meds:        Results Reviewed:   I have personally reviewed the results from the time of this admission to 2022 14:30 EDT     Results from last  7 days   Lab Units 07/15/22  2207 07/13/22  1053 07/12/22  1052 07/11/22  2325   SODIUM mmol/L 134* 129*  --  133*   POTASSIUM mmol/L 4.2 4.6  --  4.2   CHLORIDE mmol/L 98 94*  --  99   CO2 mmol/L 25.0 23.0  --  24.0   BUN mg/dL 15 35*  --  22*   CREATININE mg/dL 2.03* 3.63*  --  2.31*   CALCIUM mg/dL 8.2* 7.7*  --  8.3*   BILIRUBIN mg/dL 0.5  --  0.5 0.5   ALK PHOS U/L 451*  --  551* 571*   ALT (SGPT) U/L 16  --  18 18   AST (SGOT) U/L 36*  --  31 34*   GLUCOSE mg/dL 131* 304*  --  202*       Estimated Creatinine Clearance: 28.6 mL/min (A) (by C-G formula based on SCr of 2.03 mg/dL (H)).    Results from last 7 days   Lab Units 07/13/22  1053 07/11/22  2325   PHOSPHORUS mg/dL 4.3 3.0             Results from last 7 days   Lab Units 07/15/22  2207 07/12/22  1052 07/11/22  2325   WBC 10*3/mm3 11.29* 6.34 7.02   HEMOGLOBIN g/dL 8.5* 8.0* 8.3*   PLATELETS 10*3/mm3 187 149 156             Assessment / Plan     ASSESSMENT:  1.    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 HD MWF.  2.  Intracerebral bleed and altered mental status.  Rehab .   3.  Infected TDC, s/p removal 5/1. Replaced. Staph aureus. completed vancomycin.  4.  DM2    5.  Coronary artery disease  6.  Acute on chronic diastolic dysfunction . Volume off with HD.   7.  Anemia of CKD, on long-acting ANDRAE as an outpatient.  And hold glucose of hypertension  8. Hypothyroidism.  TSH still very high and Free t4 low .  LHA increased replacement .    9. 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 On ASA.     PLAN:    1.  HD today, will challenge her dry weight.  Counseled extensively on following low-salt diet.  2.  We will increase clonidine to 0.33 times a day   3.  Surveillance labs      Ruthann Palmer MD  07/18/22  14:30 EDT    [facility]  [phone]

## 2022-07-18 NOTE — PROGRESS NOTES
Inpatient Rehabilitation Plan of Care Note    Plan of Care  Care Plan Reviewed - Updates as Follows    Body Function Structure    [RN] Skin Integrity(Active)  Current Status(07/17/2022): Rash to back and lower buttocks.  Weekly Goal(07/21/2022): Rash to resolve.  Discharge Goal: No skin problem.    Performed Intervention(s)  Ointment per MD order      Body Systems    [RN] Genitourinary(Active)  Current Status(07/17/2022): Patient is a M,W,F HD patient with a tunneled  catheter to the R chest. Pt produces little urine.  pt oliguric. New cath order  6/20: straight cath daily and prn until cath residual < 300 ml x3 days.  Weekly Goal(07/22/2022): Pt will plan to transition to community dialysis.  Discharge Goal: Pt will transition to community dialysis.    [RN] Endocrine(Active)  Current Status(07/17/2022): Patient is a diabetic.  Weekly Goal(07/21/2022): Blood glucose will be controlled.  Discharge Goal: Patient will have medication regimen that she can manage at  home.    Performed Intervention(s)  Dialysis mwf  Monitor weight and I/O.  DM educator consult.  Accuchecks as ordered.      Pain    [RN] Pain Management(Active)  Current Status(07/17/2022): Pt has consistent pain to L groin/L medial upper  thigh . Muscle biopsy site to L thigh .  Weekly Goal(07/21/2022): Pain will be controlled.  Discharge Goal: Pt will have pain management regimen that she can follow at  home.    Performed Intervention(s)  Pain meds prn .  Ice to LLE per order      Psychosocial    [RN] Coping/Adjustment(Active)  Current Status(07/17/2022): Pt is A/Ox4. Pt is at increased risk for reduced  coping r/t hospitalization and comorbidities. Pt has been calling for assistance  appropriately as needed. Pt has supportive family.  Weekly Goal(07/22/2022): Pt will verbalize needs, feelings, concerns to staff as  needed.  Discharge Goal: Pt will verbalize adequate coping strategies to use at home.    Performed Intervention(s)  Provide distraction  and/or relaxation as needed.  Allow extra time for patient to verbalize needs, concerns, feelings, etc.      Safety    [RN] Potential for Injury(Active)  Current Status(07/17/2022): Patient is at increased risk for falls/injury r/t  hospitalization and generalized weakness.  Weekly Goal(07/21/2022): Patient will call appropriately for assistance as  needed.  Discharge Goal: Patient will remain free from falls/injury.    Performed Intervention(s)  Fall precautions: bed low and locked, chair alarm and bed alarms in use, nonskid  socks and gait belt in use, call light and personal belongings within reach.  Hourly rounding.      Sphincter Control    [RN] Bowel and bladder (Active)  Current Status(07/17/2022): Patient can be continent and is often incontinent of  stool. Patient is oliguric and is an HD patient. New cath order 6/20.  Weekly Goal(07/22/2022): Pt will be continent 75% of the time. Patient will have  BM at least q3 days or within normal patterns.  Discharge Goal: Patient will be continent 100% of the time.    Performed Intervention(s)  Monitor I/O.  Bowel meds prn.    Signed by: Mona Marie RN

## 2022-07-19 LAB
GLUCOSE BLDC GLUCOMTR-MCNC: 173 MG/DL (ref 70–130)
GLUCOSE BLDC GLUCOMTR-MCNC: 267 MG/DL (ref 70–130)
GLUCOSE BLDC GLUCOMTR-MCNC: 279 MG/DL (ref 70–130)
GLUCOSE BLDC GLUCOMTR-MCNC: 280 MG/DL (ref 70–130)

## 2022-07-19 PROCEDURE — 82962 GLUCOSE BLOOD TEST: CPT

## 2022-07-19 PROCEDURE — 63710000001 PREDNISONE PER 5 MG: Performed by: PHYSICAL MEDICINE & REHABILITATION

## 2022-07-19 PROCEDURE — 63710000001 INSULIN LISPRO (HUMAN) PER 5 UNITS: Performed by: NURSE PRACTITIONER

## 2022-07-19 PROCEDURE — 97110 THERAPEUTIC EXERCISES: CPT

## 2022-07-19 RX ORDER — SPIRONOLACTONE 50 MG/1
50 TABLET, FILM COATED ORAL DAILY
Status: DISCONTINUED | OUTPATIENT
Start: 2022-07-19 | End: 2022-07-20

## 2022-07-19 RX ORDER — INSULIN LISPRO 100 [IU]/ML
0-14 INJECTION, SOLUTION INTRAVENOUS; SUBCUTANEOUS
Status: DISCONTINUED | OUTPATIENT
Start: 2022-07-19 | End: 2022-07-22 | Stop reason: HOSPADM

## 2022-07-19 RX ADMIN — SODIUM ZIRCONIUM CYCLOSILICATE 5 G: 5 POWDER, FOR SUSPENSION ORAL at 08:31

## 2022-07-19 RX ADMIN — CARVEDILOL 25 MG: 25 TABLET, FILM COATED ORAL at 08:31

## 2022-07-19 RX ADMIN — OXYCODONE 5 MG: 5 TABLET ORAL at 05:55

## 2022-07-19 RX ADMIN — GABAPENTIN 100 MG: 100 CAPSULE ORAL at 15:41

## 2022-07-19 RX ADMIN — CARVEDILOL 25 MG: 25 TABLET, FILM COATED ORAL at 16:36

## 2022-07-19 RX ADMIN — TAMSULOSIN HYDROCHLORIDE 0.4 MG: 0.4 CAPSULE ORAL at 08:31

## 2022-07-19 RX ADMIN — HYDRALAZINE HYDROCHLORIDE 10 MG: 10 TABLET, FILM COATED ORAL at 22:52

## 2022-07-19 RX ADMIN — SERTRALINE 150 MG: 100 TABLET, FILM COATED ORAL at 08:31

## 2022-07-19 RX ADMIN — INSULIN LISPRO 6 UNITS: 100 INJECTION, SOLUTION INTRAVENOUS; SUBCUTANEOUS at 08:30

## 2022-07-19 RX ADMIN — ASPIRIN 81 MG: 81 TABLET, COATED ORAL at 12:23

## 2022-07-19 RX ADMIN — HYDRALAZINE HYDROCHLORIDE 100 MG: 50 TABLET, FILM COATED ORAL at 05:52

## 2022-07-19 RX ADMIN — CLONIDINE HYDROCHLORIDE 0.3 MG: 0.1 TABLET ORAL at 15:41

## 2022-07-19 RX ADMIN — Medication 1 TABLET: at 08:31

## 2022-07-19 RX ADMIN — OXYCODONE 5 MG: 5 TABLET ORAL at 12:22

## 2022-07-19 RX ADMIN — LOSARTAN POTASSIUM 100 MG: 100 TABLET, FILM COATED ORAL at 08:31

## 2022-07-19 RX ADMIN — CLONIDINE HYDROCHLORIDE 0.3 MG: 0.1 TABLET ORAL at 20:29

## 2022-07-19 RX ADMIN — CLONIDINE HYDROCHLORIDE 0.3 MG: 0.1 TABLET ORAL at 05:53

## 2022-07-19 RX ADMIN — PANTOPRAZOLE SODIUM 40 MG: 40 TABLET, DELAYED RELEASE ORAL at 05:53

## 2022-07-19 RX ADMIN — GABAPENTIN 300 MG: 300 CAPSULE ORAL at 20:30

## 2022-07-19 RX ADMIN — GABAPENTIN 300 MG: 300 CAPSULE ORAL at 08:31

## 2022-07-19 RX ADMIN — HYDRALAZINE HYDROCHLORIDE 100 MG: 50 TABLET, FILM COATED ORAL at 20:29

## 2022-07-19 RX ADMIN — INSULIN GLARGINE-YFGN 8 UNITS: 100 INJECTION, SOLUTION SUBCUTANEOUS at 08:30

## 2022-07-19 RX ADMIN — ZOLPIDEM TARTRATE 2.5 MG: 5 TABLET ORAL at 22:36

## 2022-07-19 RX ADMIN — OXYCODONE 5 MG: 5 TABLET ORAL at 20:30

## 2022-07-19 RX ADMIN — OXYCODONE 5 MG: 5 TABLET ORAL at 02:13

## 2022-07-19 RX ADMIN — OXYCODONE 5 MG: 5 TABLET ORAL at 16:36

## 2022-07-19 RX ADMIN — ROPINIROLE HYDROCHLORIDE 0.75 MG: 0.5 TABLET, FILM COATED ORAL at 20:30

## 2022-07-19 RX ADMIN — SPIRONOLACTONE 50 MG: 50 TABLET, FILM COATED ORAL at 17:16

## 2022-07-19 RX ADMIN — LIDOCAINE 1 PATCH: 50 PATCH CUTANEOUS at 08:31

## 2022-07-19 RX ADMIN — LEVOTHYROXINE SODIUM 200 MCG: 0.1 TABLET ORAL at 05:18

## 2022-07-19 RX ADMIN — INSULIN LISPRO 3 UNITS: 100 INJECTION, SOLUTION INTRAVENOUS; SUBCUTANEOUS at 16:36

## 2022-07-19 RX ADMIN — PREDNISONE 5 MG: 10 TABLET ORAL at 08:31

## 2022-07-19 NOTE — SIGNIFICANT NOTE
07/19/22 1545   OTHER   Discipline occupational therapist   Rehab Time/Intention   Session Not Performed patient unavailable for treatment  (off floor in AM and PM sessions for HD)   Recommendation   OT - Next Appointment 07/20/22

## 2022-07-19 NOTE — PROGRESS NOTES
TEAM CONF - July 19 - BED MOD TO GET LLE INTO BED. TRANSFERS MIN. GAIT 40 FEET CTG MIN. TOILET TRANSFERS MIN. SHOWER TRANSFERS MIN.   UBB SBA. LBD MIN MOD. UBD SET UP. LBD MOD MAX. GROOMING SET UP. TOILETING MIN MOD. ON PREDNISONE 5 MG DAILY. BLOOD GLUCOSE ELEVATED ON PREDNISONE. ISC FOR LEFT HYDROURETERONEPHROSIS ONCE OR TWICE A DAY DEPENDING ON VOLUME.    [facility] - WORKING ON SUBACUTE

## 2022-07-19 NOTE — PROGRESS NOTES
Name: Zaina Martinez ADMIT: 2022   : 1965  PCP: Karson Oreilly MD    MRN: [id number] LOS: 67 days   AGE/SEX: 56 y.o. female  ROOM: [facility]          Patient not seen but glucose reviewed and is elevated on prednisone. Will increase lantus to 10units and increase to moderate- high SSI. Will follow from periphery. Defer BP management to nephrology        Results Review     I reviewed the patient's new clinical results.    Glucose   Date/Time Value Ref Range Status   2022 0614 267 (H) 70 - 130 mg/dL Final     Comment:     Meter: [id number] : [id number] [facility]   2022 0154 280 (H) 70 - 130 mg/dL Final     Comment:     Meter: [id number] : [id number] Frank Dunn RN   2022 2041 260 (H) 70 - 130 mg/dL Final     Comment:     Meter: [id number] : [id number] [facility]   2022 1639 143 (H) 70 - 130 mg/dL Final     Comment:     Meter: [id number] : [id number] Carlos Holguin    2022 1113 181 (H) 70 - 130 mg/dL Final     Comment:     Meter: [id number] : [id number] Anup Martin    2022 0611 212 (H) 70 - 130 mg/dL Final     Comment:     Meter: [id number] : [id number] [facility]   2022 0152 234 (H) 70 - 130 mg/dL Final     Comment:     Meter: [id number] : [id number] Frank Dunn RN       No radiology results for the last day  Scheduled Medications  aspirin, 81 mg, Oral, Daily  b complex-vitamin c-folic acid, 1 tablet, Oral, Daily  carvedilol, 25 mg, Oral, BID With Meals  cloNIDine, 0.3 mg, Oral, Q8H  gabapentin, 100 mg, Oral, Q24H  gabapentin, 300 mg, Oral, BID  hydrALAZINE, 100 mg, Oral, Q8H  [START ON 2022] insulin glargine, 10 Units, Subcutaneous, QAM  insulin lispro, 0-9 Units, Subcutaneous, TID AC  levothyroxine, 200 mcg, Oral, Q AM  lidocaine, 1 patch, Transdermal, Q24H  losartan, 100 mg, Oral, Q24H  miconazole, 1 application, Topical, Q12H  pantoprazole, 40 mg, Oral, Q AM  predniSONE, 5 mg, Oral, Daily With  Breakfast  rOPINIRole, 0.75 mg, Oral, Nightly  sertraline, 150 mg, Oral, Daily  sodium zirconium cyclosilicate, 5 g, Oral, Daily  tamsulosin, 0.4 mg, Oral, Daily    Infusions   Diet  Diet Regular; Thin; Consistent Carbohydrate, Low Potassium, Renal, Daily Fluid Restriction; Other; 1,200; 2 gm (50 mEq)        RIKA Mckay  [facility]  07/19/22  12:04 EDT

## 2022-07-19 NOTE — NURSING NOTE
This nurse called dialysis to get estimate of when treatment would start. Was called back and was told that pt would not be seen until after 0600. Called MD at 1933 and MD answering service stated they would relay the message to provider.

## 2022-07-19 NOTE — PROGRESS NOTES
Case Management  Inpatient Rehabilitation Team Conference    Conference Date/Time: 7/19/2022 7:47:44 AM    Team Conference Attendees:  Bernadette Alfaro, STACY Rodriguez, PTA  Daiana Luque, PT  Tasha Helm, OT  Liset Fair, CTRS  Carol Jennings RD, LD  Brisa Baires, RN  Marah Akhtar, RN  Chaplain Olivia    Demographics            Age: 56Y            Gender: Female    Admission Date: 5/13/2022 7:05:00 PM  Rehabilitation Diagnosis:  Hemorrhage, Left Pietro  Past Medical History: Past Medical History:  DiagnosisDate  ?Acute CVA (cerebrovascular accident) (HCC)5/1/2022  ?Acute on chronic diastolic CHF (congestive heart failure) (HCC)?  ?CAD (coronary artery disease)12/20/2021  ?Diabetes (HCC)?  ?Disease of thyroid gland?  ?GERD (gastroesophageal reflux disease)?  ?Tfrypfkrffglsx78/30/2018  ?Hypertension?  ?Rheumatoid arthritis (HCC)?    ?  ?  Family History  ProblemRelationAge of Onset  ?Malig HyperthermiaNeg Hx?  ?  ?      Plan of Care  Anticipated Discharge Date/Estimated Length of Stay: ELOS: Pending placement  Anticipated Discharge Destination: Community discharge with assistance  Discharge Plan : Discharge plan is now to sub acute facility. Referrals made to  several facitlites but no beds available. Will continue search for sub acute  facility that can accept patient.  Medical Necessity Expected Level Rationale: Goals are to acheieve a level of CGA  with mobility and ADL's.  Rehabilitation prognosis indeterminate.  Medical  prognosis indeterminate.  Intensity and Duration: an average of 3 hours/5 days per week  Medical Supervision and 24 Hour Rehab Nursing: x  Physical Therapy: x  PT Intensity/Duration: 1 hour / day, 5 days / week, for approximately 2 weeks.  Occupational Therapy: x  OT Intensity/Duration: 1 hour / day, 5 days / week, for approximately 2 weeks.  Speech and Language Therapy: x  SLP Intensity/Duration: 1 hour / day, 5 days / week, for approximately 2  weeks.  Social Work: x  Therapeutic Recreation: x  Psychology: x  Registered Dietician: x  Updated (if changes indicated)    Anticipated Discharge Date/Estimated Length of Stay:   ELOS: Pending placement    Based on the patient's medical and functional status, their prognosis and  expected level of functional improvement is: Goals are to acheieve a level of  CGA with mobility and ADL's.  Rehabilitation prognosis indeterminate.  Medical  prognosis indeterminate.      Interdisciplinary Problem/Goals/Status    All Rehab Problems:  Body Function Structure    [RN] Skin Integrity(Active)  Current Status(07/19/2022): Rash to back and lower buttocks.  Weekly Goal(07/21/2022): Rash to resolve.  Discharge Goal: No skin problem.        Body Systems    [RN] Genitourinary(Active)  Current Status(07/19/2022): Patient is a M,W,F HD patient with a tunneled  catheter to the R chest. Pt produces little urine.  pt oliguric. New cath order  6/20: straight cath daily and prn until cath residual < 300 ml x3 days.  Weekly Goal(07/22/2022): Pt will plan to transition to community dialysis.  Discharge Goal: Pt will transition to community dialysis.    [RN] Endocrine(Active)  Current Status(07/19/2022): Patient is a diabetic.  Weekly Goal(07/21/2022): Blood glucose will be controlled.  Discharge Goal: Patient will have medication regimen that she can manage at  home.        Cognition    [ST] Executive Functions(Active)  Current Status(06/27/2022): slow response and procesisng times; mod impaired  language/word retrieval; improved ability to formulate simple sentences for  structured tasks/conversations; mod impaired verbal memory  Weekly Goal(06/28/2022): recall details from daily events indep[  Discharge Goal: fxnl cognition for basic ADL activities        Mobility    [OT] Toilet Transfers(Active)  Current Status(07/18/2022): MIN  Weekly Goal(07/19/2022): CGA  Discharge Goal: CGA    [OT] Tub/Shower Transfers(Active)  Current  Status(07/18/2022): min A  Weekly Goal(07/19/2022): CGA  Discharge Goal: CGA    [PT] Bed/Chair/Wheelchair(Active)  Current Status(07/18/2022): Ranulfo  Weekly Goal(07/25/2022): CGA  Discharge Goal: CGA    [PT] Bed Mobility(Active)  Current Status(07/18/2022): modA to get LLE into bed  Weekly Goal(07/25/2022): Ranulfo  Discharge Goal: Ranulfo    [PT] Car Transfers(Active)  Current Status(07/18/2022): CGA, RW (on 7/2)  Weekly Goal(07/25/2022): PT only  Discharge Goal: Ranulfo    [PT] Walk(Active)  Current Status(07/18/2022): 40' CGA-Ranlufo, RW  Weekly Goal(07/25/2022): to BR, RW, CGA/SBA  Discharge Goal: 160', AAD,SBA/CGA    [PT] Stairs(Active)  Current Status(07/18/2022): 4, rails, Ranulfo (on 7/2)  Weekly Goal(07/25/2022): PT only  Discharge Goal: 4, 1 rail, Ranulfo        Pain    [RN] Pain Management(Active)  Current Status(07/19/2022): Pt has consistent pain to L groin/L medial upper  thigh . Muscle biopsy site to L thigh .  Weekly Goal(07/21/2022): Pain will be controlled.  Discharge Goal: Pt will have pain management regimen that she can follow at  home.        Psychosocial    [RN] Coping/Adjustment(Active)  Current Status(07/19/2022): Pt is A/Ox4. Pt is at increased risk for reduced  coping r/t hospitalization and comorbidities. Pt has been calling for assistance  appropriately as needed. Pt has supportive family.  Weekly Goal(07/22/2022): Pt will verbalize needs, feelings, concerns to staff as  needed.  Discharge Goal: Pt will verbalize adequate coping strategies to use at home.    [RN] Behavior(Active)  Current Status(01/01/1753):  Weekly Goal(01/01/1753):  Discharge Goal:        Safety    [RN] Potential for Injury(Active)  Current Status(07/19/2022): Patient is at increased risk for falls/injury r/t  hospitalization and generalized weakness.  Weekly Goal(07/21/2022): Patient will call appropriately for assistance as  needed.  Discharge Goal: Patient will remain free from falls/injury.        Self Care    [OT]  Bathing(Active)  Current Status(07/18/2022): UB-SBA, LB-MIN/MOD  Weekly Goal(07/19/2022): MIn  Discharge Goal: MIN    [OT] Dressing (Lower)(Active)  Current Status(07/18/2022): Mod/MAX  Weekly Goal(07/19/2022): MOD  Discharge Goal: MOD    [OT] Dressing (Upper)(Active)  Current Status(07/18/2022): set up  Weekly Goal(07/19/2022): SBA  Discharge Goal: SBA    [OT] Grooming(Active)  Current Status(07/18/2022): set up  Weekly Goal(07/19/2022): SBA  Discharge Goal: SBA    [OT] Toileting(Active)  Current Status(07/18/2022): MIN/MOD  Weekly Goal(07/19/2022): MIN  Discharge Goal: MIN        Sphincter Control    [RN] Bowel and bladder (Active)  Current Status(07/19/2022): Patient can be continent and is often incontinent of  stool. Patient is oliguric and is an HD patient. New cath order 6/20.  Weekly Goal(07/22/2022): Pt will be continent 75% of the time. Patient will have  BM at least q3 days or within normal patterns.  Discharge Goal: Patient will be continent 100% of the time.        Comments: 5/17 Slow processing, participating with therapies    5/24 Min A with transfers, CGA with ambulation with rwx.  Freq incontinent of  bowel.   Mod memory impairments.  Groin pain.  T, Th, Sat dialsysis schedule.    5/31 Family Conference last Friday, Plan dc on 6/2 with home health.  40' CGA  with rwx, limited due to pain.  Slow responses and processing at times.    6/7 Amb 80' and up to as far as 160' CGA, toilet transfers CGA, caleb tto do 4  steps CGA with rails.  Better initiation, improving in speech therapy.  C/O side  pain.  HD ongoing.  Elevated BP since HD yesterday.    6/14 A lot of pain still ongoing, biopsy of leg planned for Wednesday.  Still  moderate memory deficits.  Toilet transfers CGA, Bathing CGA.  LBD tasks need  more assistance due to pain.    6/21 Bed exercises only due to pain.  Still waiting on leg bx.  More alert with  steroids on board.  Pain main issue.    6/28 Biopsy did not really show anything in  particular.  Running some additional  staining on the biopsy.  Remain on steroids.  Getting blood sugars under control  while on steroids.  Ambulating ' rxwx.  Min A with bed to chair  transfers.  Better participation in therapy.  Able to do 4 steps with rails Min  A. Min A with toilet transfers.    7/5 Suspect diabetic muscle infarction.  Amb 160' CGA / SBA rwx, able to do 4  steps with rails Min A.  Toilet transfers Min A.  High potassium a couple times  requiring dialysis.    7/12 Bed mob Mod A, car transfers CGA with rwx, able to complete 4 steps with  rails Min A on 7/2.  Able to ambulate 60' CGA - Min A rwx.  Toilet transfers Min  A.  UBD set up, LBD Mod - Max.  Grooming set up.  Not emptying bladder all the  way, plan to ISC once daily.  Rash to her back    7/19  Mod A to get LLE into bed.  Car transfers CGA with rwx on 7/2.  Completed  4 steps with rails Min A on 7/2.  Amb 40' CGA - Min A rwx.  Daughter and  grandson completed teaching with OT.  Plan for dialysis today.  Still waiting on  placement at SNF.  Incont of bowel, ISC x 1-2 times daily.    Signed by: Brisa Baires RN    Physician CoSigned By: Adonis Florentino 07/19/2022 07:58:58

## 2022-07-19 NOTE — PROGRESS NOTES
[facility] Progress Note      Patient Name: Zaina Martinez  : 1965  MRN: [id number]  Primary Care Physician:  Karson Oreilly MD  Date of admission: 2022    Subjective     Interval History:   Follow up ESRD. .     Seen and examined.no shortness of air or chest pain.    Review of Systems:   Denies chest pain or shortness of breath    Objective     Vitals:   Temp:  [97.7 °F (36.5 °C)-97.9 °F (36.6 °C)] 97.9 °F (36.6 °C)  Heart Rate:  [53-59] 59  Resp:  [18] 18  BP: (162-173)/(72-78) 173/72    Intake/Output Summary (Last 24 hours) at 2022 1443  Last data filed at 2022 0825  Gross per 24 hour   Intake 598 ml   Output 250 ml   Net 348 ml     Seen on dialysis    Physical Exam:   General:  in bed  Conversant.    HEENT: oral mucosa moist.   Neck:RIJ TDC  Lungs:clear to auscultation. Unlabored.    Heart:RRR no s3 or rub.  early syst m  Abdomen: + bs,soft, nontender   Extremities:1+ -2+ lower ext edema.    Neuro: speech more fluent . Moves all 4 ext.     Scheduled Meds:     aspirin, 81 mg, Oral, Daily  b complex-vitamin c-folic acid, 1 tablet, Oral, Daily  carvedilol, 25 mg, Oral, BID With Meals  cloNIDine, 0.3 mg, Oral, Q8H  gabapentin, 100 mg, Oral, Q24H  gabapentin, 300 mg, Oral, BID  hydrALAZINE, 100 mg, Oral, Q8H  [START ON 2022] insulin glargine, 10 Units, Subcutaneous, QAM  insulin lispro, 0-14 Units, Subcutaneous, TID AC  levothyroxine, 200 mcg, Oral, Q AM  lidocaine, 1 patch, Transdermal, Q24H  losartan, 100 mg, Oral, Q24H  miconazole, 1 application, Topical, Q12H  pantoprazole, 40 mg, Oral, Q AM  predniSONE, 5 mg, Oral, Daily With Breakfast  rOPINIRole, 0.75 mg, Oral, Nightly  sertraline, 150 mg, Oral, Daily  sodium zirconium cyclosilicate, 5 g, Oral, Daily  tamsulosin, 0.4 mg, Oral, Daily      IV Meds:        Results Reviewed:   I have personally reviewed the results from the time of this admission to 2022 14:43 EDT     Results from last 7 days    Lab Units 07/18/22  1533 07/15/22  2207 07/13/22  1053   SODIUM mmol/L 126* 134* 129*   POTASSIUM mmol/L 5.3* 4.2 4.6   CHLORIDE mmol/L 94* 98 94*   CO2 mmol/L 20.0* 25.0 23.0   BUN mg/dL 42* 15 35*   CREATININE mg/dL 3.84* 2.03* 3.63*   CALCIUM mg/dL 7.7* 8.2* 7.7*   BILIRUBIN mg/dL  --  0.5  --    ALK PHOS U/L  --  451*  --    ALT (SGPT) U/L  --  16  --    AST (SGOT) U/L  --  36*  --    GLUCOSE mg/dL 161* 131* 304*       Estimated Creatinine Clearance: 15.1 mL/min (A) (by C-G formula based on SCr of 3.84 mg/dL (H)).    Results from last 7 days   Lab Units 07/13/22  1053   PHOSPHORUS mg/dL 4.3             Results from last 7 days   Lab Units 07/18/22  1533 07/15/22  2207   WBC 10*3/mm3 11.46* 11.29*   HEMOGLOBIN g/dL 8.4* 8.5*   PLATELETS 10*3/mm3 239 187             Assessment / Plan     ASSESSMENT:  1.  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 HD MWF.  2.  Intracerebral bleed and altered mental status.  Rehab .   3.  Infected TDC, s/p removal 5/1. Replaced. Staph aureus. completed vancomycin.  4.  DM2    5.  Coronary artery disease  6.  Acute on chronic diastolic dysfunction . Volume off with HD.   7.  Anemia of CKD, on long-acting ANDRAE as an outpatient.    8. Hypothyroidism.  TSH still very high and Free t4 low .  Her medications were adjusted  9. 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 On ASA.     PLAN:    1.  HD at AM.  Blood pressure is not at goal.  We will continue to adjust blood pressure medications as needed after dialysis  2.  Surveillance labs      Ruthann Palmer MD  07/19/22  14:43 EDT    [facility]  [phone]

## 2022-07-19 NOTE — PLAN OF CARE
Goal Outcome Evaluation:  Plan of Care Reviewed With: patient           Outcome Evaluation: Zaina is alert and oriented x4, assist x1, and takes medication with water- fluid restriction 1200cc. She was in dialysis all morning, so almost no therapies were completed. Hemodialysis M/W/F, but unable to complete yesterday so scheduled for early today.  Pain medication given once while in dialysis, Accucheck changed to TID, Lantus modified, and Spironolactone added. Dr Florentino would like Zaina to be catheterized 1 to 2 times daily and incontinent of bowel overnight. Covid test scheduled for 7/21 related to a previous outside doctor appointment.

## 2022-07-19 NOTE — PROGRESS NOTES
[facility], and [facility] do not have bed available so cannot accept patient. [facility] have bed available tomorrow or Thursday and have accepted patient. Discussed this with  and daughter, by phone. They understand other facilities above are not options and agreeable to [facility]. Discussed with  that he will have to provide transportation to/from dialysis until patient gets approved for TAR 3, which hopefully will be soon.  Discussed with patient. She was tearful as she doesn't want to go to nursing home. She understands she cannot go home at this time. Encouraged her to continue to work hard in therapies so can hopefully go home soon. [facility] will start pre-cert with patient's insurance. Will assist with plan for transfer once insurance pre-cert obtained by facility. Discussed with  that he would need to transport patient to facility by car.

## 2022-07-19 NOTE — THERAPY TREATMENT NOTE
Inpatient Rehabilitation - Physical Therapy Treatment Note       [facility]     Patient Name: Zaina Martinez  : 1965  MRN: [id number]    Today's Date: 2022                    Admit Date: 2022      Visit Dx:     ICD-10-CM ICD-9-CM   1. Generalized weakness  R53.1 780.79   2. Diabetic muscle infarction (formerly [facility])  E11.69 250.80    M62.20 728.89   3. Other insomnia  G47.09 780.52       Patient Active Problem List   Diagnosis   • Renal insufficiency   • Hypertensive disorder   • Hypothyroidism   • Type 2 diabetes mellitus with kidney complication, with long-term current use of insulin (formerly [facility])   • Rheumatoid arthritis (formerly [facility])   • Angioedema   • Esophageal dysmotility   • Anemia   • Medically noncompliant   • Myocardial infarction due to demand ischemia (formerly [facility])   • Enteritis   • PRES (posterior reversible encephalopathy syndrome)   • Urine retention   • Klebsiella infection   • Superficial thrombophlebitis   • Generalized weakness   • ESRD (end stage renal disease) (formerly [facility])   • CAD (coronary artery disease)   • Abnormal urinalysis   • Chronic diastolic CHF (congestive heart failure) (formerly [facility])   • Pyelonephritis   • Calculus of gallbladder with acute on chronic cholecystitis without obstruction   • Pleural effusion on right   • Anemia due to chronic kidney disease, on chronic dialysis (formerly [facility])   • Abnormal findings on diagnostic imaging of other specified body structures   • Acute upper respiratory infection   • Agitation   • Alkaline phosphatase raised   • Casts present in urine   • Cellulitis of toe   • Hip pain   • Community acquired pneumonia   • Depressive disorder   • Diarrhea of presumed infectious origin   • Difficult or painful urination   • Disease due to severe acute respiratory syndrome coronavirus 2 (SARS-CoV-2)   • Dyspnea   • Encounter for follow-up examination after completed treatment for conditions other than malignant neoplasm   • H/O: hypothyroidism   • Hyperlipidemia   • Hypomagnesemia   • Intractable  vomiting with nausea   • Leukocytosis   • Luetscher's syndrome   • Need for influenza vaccination   • Restless legs   • Noncompliance with treatment   • Shoulder pain   • Urinary tract infectious disease   • Metabolic encephalopathy   • Abnormal findings on diagnostic imaging of abdomen   • Status post cholecystectomy   • Hyponatremia   • Leukocytosis   • Acute metabolic encephalopathy   • Encephalopathy, toxic   • Acute CVA (cerebrovascular accident) (HCC)   • Intracranial hemorrhage (HCC)   • Stroke (HCC)   • Abnormal urinalysis   • Diabetic muscle infarction (HCC)   • Other insomnia       Past Medical History:   Diagnosis Date   • Acute CVA (cerebrovascular accident) (HCC) 5/1/2022   • Acute on chronic diastolic CHF (congestive heart failure) (HCC)    • CAD (coronary artery disease) 12/20/2021   • Diabetes (HCC)    • Disease of thyroid gland    • GERD (gastroesophageal reflux disease)    • Hyperlipidemia 11/30/2018   • Hypertension    • Rheumatoid arthritis (HCC)        Past Surgical History:   Procedure Laterality Date   • CHOLECYSTECTOMY WITH INTRAOPERATIVE CHOLANGIOGRAM N/A 1/10/2022    Procedure: Laparoscopic cholecystectomy with intraoperative cholangiogram;  Surgeon: Ramana Raygoza MD;  Location: [facility];  Service: General;  Laterality: N/A;   • EYE SURGERY     • HYSTERECTOMY     • INSERTION HEMODIALYSIS CATHETER N/A 12/6/2021    Procedure: HEMODIALYSIS CATHETER INSERTION;  Surgeon: Keli Salazar MD;  Location: [facility] 18/19;  Service: Vascular;  Laterality: N/A;   • INSERTION HEMODIALYSIS CATHETER N/A 5/3/2022    Procedure: TUNNELED CATHETER PLACEMENT;  Surgeon: Keli Salazar MD;  Location: [facility];  Service: Vascular;  Laterality: N/A;   • MUSCLE BIOPSY Left 6/15/2022    Procedure: Left quadriceps muscle biopsy;  Surgeon: Marques Ma MD;  Location: [facility];  Service: Neurosurgery;  Laterality: Left;       PT ASSESSMENT (last 12 hours)     IRF PT  Evaluation and Treatment     Row Name 07/19/22 0815          PT Time and Intention    Document Type daily treatment  -LB     Mode of Treatment physical therapy  -LB     Patient/Family/Caregiver Comments/Observations Pnt supine in bed. C/O L thigh pain.  -LB     Evaluation/Treatment Not Performed patient unavailable  Pnt off of Rehab unit for dialysis from early morning (approx 08:30), to later in afternoon (approx 1530.) Received 30 min PT only.  -LB     Row Name 07/19/22 0815          General Information    Existing Precautions/Restrictions fall;other (see comments)  contact precautions  -LB     Comment, General Information In AM declined to amb, or tsf to w/c 2ary to L thigh pain.Seen bedside for ther ex.Moves quite slowly w ex.  -LB     Row Name 07/19/22 0815          Pain Assessment    Pretreatment Pain Rating 8/10  -LB     Pain Location - Side/Orientation Left  -LB     Pain Location - other (see comments)  thigh  -LB     Pre/Posttreatment Pain Comment nurse notified  -LB     Row Name 07/19/22 0815          Bed-Chair Transfer    Comment, (Bed-Chair Transfer) declined  -LB     Row Name 07/19/22 0815          Gait/Stairs (Locomotion)    Comment, (Gait/Stairs) declined  -LB     Row Name 07/19/22 0815          Hip (Therapeutic Exercise)    Hip Isometrics (Therapeutic Exercise) bilateral;gluteal sets;supine;10 repetitions;aDduction  -LB     Row Name 07/19/22 0815          Knee (Therapeutic Exercise)    Knee AROM (Therapeutic Exercise) bilateral;SAQ (short arc quad);supine  5 reps LLE, 10 reps RLE.  -LB     Knee Isometrics (Therapeutic Exercise) bilateral;quad sets;supine;10 repetitions  -LB     Row Name 07/19/22 0815          Ankle (Therapeutic Exercise)    Ankle AROM (Therapeutic Exercise) bilateral;dorsiflexion;plantarflexion;supine;10 repetitions  -LB     Row Name 07/19/22 0815          Positioning and Restraints    Post Treatment Position bed  -LB     In Bed supine;exit alarm on;call light within reach  -LB            User Key  (r) = Recorded By, (t) = Taken By, (c) = Cosigned By    Initials Name Provider Type     Liz Rodriguez PTA Physical Therapist Assistant              Wound 06/15/22 1312 Left anterior thigh Incision (Active)   Dressing Appearance open to air;no drainage 07/18/22 2205   Closure Liquid skin adhesive 07/18/22 2205   Base clean;dry 07/18/22 2205   Drainage Amount none 07/19/22 0830   Dressing Care open to air 07/19/22 0830     Physical Therapy Education                 Title: PT OT SLP Therapies (In Progress)     Topic: Physical Therapy (In Progress)     Point: Mobility training (Done)     Learning Progress Summary           Patient Acceptance, E,D, VU,DU by JEREL at 7/18/2022 1550   Family Acceptance, E,D, VU,NR by GUSTAVO at 7/13/2022 1456    Comment: Stairs, (dgt practiced). 2 Helpers for stairs used.  Attempted car tsf to dgts car. Unable to perform as the SUV was too high for pnt. A platform step was offered for practice, but pnt declined.  Pnt refused amb during fam teaching, 2ary to L thigh pain.   Significant Other Acceptance, E, VU by GUSTAVO at 7/15/2022 1556    Comment: car tsf      Show all documentation for this point (41)                 Point: Home exercise program (In Progress)     Learning Progress Summary           Patient Acceptance, E,TB, NR by MS at 7/11/2022 1434      Show all documentation for this point (13)                 Point: Body mechanics (Done)     Learning Progress Summary           Patient Acceptance, E,D, VU,DU by DP at 7/18/2022 1550   Family Acceptance, E, VU by GUSTAVO at 7/13/2022 1504      Show all documentation for this point (9)                 Point: Precautions (Done)     Learning Progress Summary           Patient Acceptance, E,D, VU,DU by DP at 7/18/2022 1550      Show all documentation for this point (9)                             User Key     Initials Effective Dates Name Provider Type Discipline     03/07/18 -  Liz Rodriguez PTA Physical Therapist Assistant  PT    MS 06/16/21 -  Teagan Santos PT Physical Therapist PT    DP 08/24/21 -  Jay Jim PT Physical Therapist PT                PT Recommendation and Plan            Patient was wearing a face mask during this therapy encounter. Therapist used appropriate personal protective equipment including mask and gloves.  Mask used was standard procedure mask. Appropriate PPE was worn during the entire therapy session. Hand hygiene was completed before and after therapy session. Patient is not in enhanced droplet precautions.                  Time Calculation:      PT Charges     Row Name 07/19/22 0954             Time Calculation    Start Time 0800  -LB      Stop Time 0830  -LB      Time Calculation (min) 30 min  -LB      PT Goal Re-Cert Due Date 07/26/22  -LB            User Key  (r) = Recorded By, (t) = Taken By, (c) = Cosigned By    Initials Name Provider Type    Liz Henriquez PTA Physical Therapist Assistant                Therapy Charges for Today     Code Description Service Date Service Provider Modifiers Qty    65365760832 HC PT THER PROC EA 15 MIN 7/19/2022 Liz Rodriguez PTA GP 2            PT G-Codes  AM-PAC 6 Clicks Score (PT): 17      Liz Rodriguez PTA  7/19/2022

## 2022-07-19 NOTE — PLAN OF CARE
Goal Outcome Evaluation:  Plan of Care Reviewed With: patient             Problem: Rehabilitation ([facility]) Plan of Care  Goal: Plan of Care Review  Outcome: Ongoing, Progressing  Flowsheets  Taken 7/19/2022 0227 by Mona Marie RN  Outcome Evaluation: Alert and oriented x 4. Flat affect at times, very pleasant. Took meds whole PO with thin liquids. Fluid restriction 1200cc/daily. Continent of bowel. Oliguric. Bladder scanned q shift and IC if >300cc. Has HD MWF. Was suposed to get dialysis this evening, called placed and was told that it will be pushed back until after 0600 on 7/19. Continues to c/o of left thigh edema and tenderness. PRN Vanessa 5mg administered at 2205 and at 0213. No unsafe behaviors. Call light within reach.

## 2022-07-19 NOTE — PROGRESS NOTES
CC: Debility    SUBJECTIVE:    She reports that she tolerated hemodialysis today.  Continues with pain at the left thigh.  Does not notice any significant change with addition of prednisone 5 mg  3 days ago.  Nursing did intermittent straight cath daily today for 250 cc.  Internal medicine adjusted insulin for hyperglycemia       OBJECTIVE:  Vitals:    07/19/22 1716   BP: 173/71   Pulse: 60   Resp:    Temp:    SpO2:        GENERAL: NAD  MENTAL STATUS: Awake alert  HEENT:  NCAT  CARDIOVASCULAR: Sinus rhythm    CHEST:  hemodialysis access right chest  RESPIRATORY: Normal respirations.   Mild basilar crackles  ABDOMEN: Nondistended.  Normoactive bowel sounds    EXTREMITIES: Left thigh edema .  Edema at the left thigh appears similar..  Also has some edema in the left lower leg as well as right lower leg      No myoclonus.  NEUROLOGIC:    Takes resistance in the bilateral upper extremities, right lower extremity and distal left lower extremity.  Pain inhibition proximally in the left lower extremity but at least antigravity left knee extension      Scheduled Meds:aspirin, 81 mg, Oral, Daily  b complex-vitamin c-folic acid, 1 tablet, Oral, Daily  carvedilol, 25 mg, Oral, BID With Meals  cloNIDine, 0.3 mg, Oral, Q8H  gabapentin, 100 mg, Oral, Q24H  gabapentin, 300 mg, Oral, BID  hydrALAZINE, 100 mg, Oral, Q8H  [START ON 7/20/2022] insulin glargine, 10 Units, Subcutaneous, QAM  insulin lispro, 0-14 Units, Subcutaneous, TID AC  levothyroxine, 200 mcg, Oral, Q AM  lidocaine, 1 patch, Transdermal, Q24H  losartan, 100 mg, Oral, Q24H  miconazole, 1 application, Topical, Q12H  pantoprazole, 40 mg, Oral, Q AM  predniSONE, 5 mg, Oral, Daily With Breakfast  rOPINIRole, 0.75 mg, Oral, Nightly  sertraline, 150 mg, Oral, Daily  sodium zirconium cyclosilicate, 5 g, Oral, Daily  spironolactone, 50 mg, Oral, Daily  tamsulosin, 0.4 mg, Oral, Daily      Continuous Infusions:   PRN Meds:.•  acetaminophen  •  dextrose  •  dextrose  •   diphenoxylate-atropine  •  glucagon (human recombinant)  •  heparin (porcine)  •  hydrALAZINE  •  nitroglycerin  •  oxyCODONE  •  oxyCODONE  •  polyethylene glycol  •  sodium chloride  •  zolpidem    Glucose   Date/Time Value Ref Range Status   07/19/2022 1613 173 (H) 70 - 130 mg/dL Final     Comment:     Meter: [id number] : [id number] Anup Martin    07/19/2022 0614 267 (H) 70 - 130 mg/dL Final     Comment:     Meter: [id number] : [id number] [facility]   07/19/2022 0154 280 (H) 70 - 130 mg/dL Final     Comment:     Meter: [id number] : [id number] Frank Dunn RN   07/18/2022 2041 260 (H) 70 - 130 mg/dL Final     Comment:     Meter: [id number] : [id number] [facility]   07/18/2022 1639 143 (H) 70 - 130 mg/dL Final     Comment:     Meter: [id number] : [id number] Carlos Holguin    07/18/2022 1113 181 (H) 70 - 130 mg/dL Final     Comment:     Meter: [id number] : [id number] Anup Martin    07/18/2022 0611 212 (H) 70 - 130 mg/dL Final     Comment:     Meter: [id number] : [id number] [facility]   07/18/2022 0152 234 (H) 70 - 130 mg/dL Final     Comment:     Meter: [id number] : [id number] Frank Dunn RN     Results from last 7 days   Lab Units 07/18/22  1533 07/15/22  2207   WBC 10*3/mm3 11.46* 11.29*   HEMOGLOBIN g/dL 8.4* 8.5*   HEMATOCRIT % 27.2* 27.4*   PLATELETS 10*3/mm3 239 187     Results from last 7 days   Lab Units 07/18/22  1533 07/15/22  2207 07/13/22  1053   SODIUM mmol/L 126* 134* 129*   POTASSIUM mmol/L 5.3* 4.2 4.6   CHLORIDE mmol/L 94* 98 94*   CO2 mmol/L 20.0* 25.0 23.0   BUN mg/dL 42* 15 35*   CREATININE mg/dL 3.84* 2.03* 3.63*   CALCIUM mg/dL 7.7* 8.2* 7.7*   BILIRUBIN mg/dL  --  0.5  --    ALK PHOS U/L  --  451*  --    ALT (SGPT) U/L  --  16  --    AST (SGOT) U/L  --  36*  --    GLUCOSE mg/dL 161* 131* 304*      [facility] Reference Range & Units 05/27/22 11:30   Creatine Kinase 20 - 180 U/L 94   Aldolase 3.3 - 10.3 U/L 5.6       Imaging Results (Last  24 Hours)     ** No results found for the last 24 hours. **          ASSESSMENT AND PLAN    # R MCA s/p TPA with subsequent ICH with debility  Initially held antiplatelet/anticoagulation.  Reviewed with neurology on May 20 and resume aspirin 81 mg daily  SBP goal <160  Recommend outpatient Neurology fu - repeat MRI in 3 months        # DM  June 27-hyperglycemia on steroids.  Lantus increased to 30 units twice daily, Humalog 5 units 3 times daily with meals, and all increase sliding scale coverage  June 28 -hypoglycemic after increasing insulin dosing.  Blood glucose up to 98 prior to lunch, will hold sliding scale insulin and give 5 units scheduled with meal  June 30-hypoglycemia-Lantus twice daily decreased from 35 to 20 units.  Scheduled Humalog with meals discontinued  July 1-prednisone has been discontinued.  Reviewed with internal medicine and Lantus decreased to 10 units twice daily and on discharge home to resume her home regimen of Lantus 10 units daily.  She will check her sugar tonight at home and if elevated give Lantus 10 units tonight and then continue with the Lantus 10 units daily tomorrow.  Reviewed with patient and her  that her sugars may be elevated initially as she just completed prednisone.  They were instructed to call for any additional instructions on adjustment in regimen if it remains elevated at home this weekend  July 18 - blood glucose increased on Prednisone 5 mg daily  July 19-Lantus adjusted     # ESRD   Nephrology following  Inpatient HD    Flomax  Hyperkalemia  July 5-patient had hemodialysis on July 3 and July 4 for hyperkalemia.  Lokelma resumed by nephrology  July 7-had hemodialysis yesterday and has plans for hemodialysis again today  July 8-hemodialysis again today     Infectious disease-   # s/p catheter infection with MRSA  Vancomycin IV with stop date of May 26  May 24-vancomycin random equal 12.90-on vancomycin 500 mg IV on Xurgkcf-Sljxpisb-Hubcgnab  May 26-to  complete course of vancomycin today  #Urinary tract infection-on Dana 3 urinalysis was negative for infection but noted to have leukocytosis increase and repeat urinalysis on June 6 shows findings consistent with urinary tract infection.  Placed on a course of cefdinir renal dose 3 mg daily for 7 days.  No costophrenic angle tenderness patient reviewed with infectious disease service.  Leukocytosis felt related to the bladder infection and not previous catheter infection.  June 8-urine culture results pending.  On cefdinir.  Culture with E. coli, sensitivity pending  June 9 - E coli sensitive to cephalosporins.  June 21 - continues with leukocytosis WBC  15K today. May be from inflammatory process. Steroids added yesterday.   June 22-urine described as milky characteristic, different from previous-recheck urinalysis with culture if indicated  June 23-Labs are still pending today.  Urinalysis also pending as she does not make much urine.  She had a temperature of 100.2 last evening, afebrile this morning.  June 27 - abnormal UA but urine culture from June 24 no growth  July 1-leukocytosis felt related to the noninfectious process in the left thigh as well as secondary to steroids  July 5-WBC trending down to 15.6 today  July 6-WBC trending down to 12 K  July 7-WBC 8.4     #left hydroureteronephrosis-Dana 3-CT scan shows left hydro ureter nephrosis.  She had some previous dilation of the ureter on comparison the past studies but increased today.  Bladder noted to be markedly distended.  Will do in and out cath now and daily to decompress bladder.  Addendum-intermittent cath for 600 cc.  June 4-once daily intermittent cath 450 cc.  June 5-seen by urology-Nguyễn catheter placed with plans to recheck hydroureter on renal ultrasound in 3 to 5 days.  June 6-Nguyễn catheter placed yesterday by urology, with only 170 cc out so far.  Patient reports did not note any change in her left groin pain after the in and out cath with  decompression of the bladder..  June 8-reviewed with urology service-request noncontrast CT scan stone protocol to evaluate for resolution of the left hydronephrosis with urology planning to see tomorrow to review the films and then make recommendations, urology would recommend leaving the Nguyễn catheter in for the time being.  June 9 - improved left hydroureter on CT , Nguyễn discontinued.   July 1-to follow-up with urology as an outpatient with follow-up CT planned in August.  She occasionally required intermittent straight cath but this was very infrequent.  Home health nursing to follow.  Continues on Flomax  July 7-intermittent straight cath 400 cc  July 11-She continues with hydroureteronephrosis on the left side on follow-up CT scan-intermittent straight cath volumes have not been over about 400 cc over the past month.  Urology re-consulted for updated assessment  July 12-plan is intermittent straight cath routinely once or twice a day  July 14 - ISC for 150 cc         #Anemia-  EPO.  June 6-hemoglobin 7.8  July 12-hemoglobin 8.0     Lymphadenopathy-evaluated by hematology oncology while here.  No significant change from scans earlier this year.  She is to repeat a scan in 3 months and follow-up with hematology oncology  July 1198-oqnbon-zz CT of the abdomen pelvis shows lymphadenopathy overall about the same per her report.  To have follow-up with hematology oncology and repeat scan in another 2 months or so     #Elevated alkaline phosphatase  June 6-elevated alkaline phosphatase 770, up from 289 one week ago, 140 one month ago. Similar elevation in March, Feb even higher at 1,330 ( intra-abdominal fluid collection, underwent CT-guided aspiration  Revealed blood and cultures did not reveal any growth and therefore antibiotics  discontinued.  Surgery also did evaluate and signed off.), and elevated during admission in January ( She was seen by general surgery who recommended and performed laparoscopic  cholecystectomy during that admission).   ALT 70, AST 87, Total bilirubin 0.8. Ca 8.3.  ? If liver source or bone or due to an inflammatory response.  June 7-GGT also elevated.  Seen by gastroenterology.  [place] biliary source.  Liver ultrasound ordered  June 8-liver ultrasound was unremarkable  June 21 - patient declined liver biopsy.   June 27-gastroenterology following peripherally-plans to follow-up as an outpatient and review option of liver biopsy again if alkaline phosphatase remains elevated  June 28-remains elevated at 503  June 30-alkaline phosphatase lower at 426  July 6-trend back up to 634  July 8-alkaline phosphatase unchanged 627  July 12-alkaline phosphatase 551     #Pain - neuropathy BL lower extremity/left thigh pain  Gabapentin/oxycodone.    June 7-patient with lethargy this morning.  May be due to urinary tract infection but with recent increase and gabapentin and oxycodone, will change gabapentin to 200 mg twice a day and decrease oxycodone from 5 back down to 2.5 mg p.o. every 4 hours as needed  June 8-better speed with her processing today  June 21 - pain med regimen recently adjusted with tramadol 25 mg q 4 hours prn, gabapentin 300 g bid, lidoderm patch, oxycodone 2.5 mg q 6 hours prn. Prednisone 60 mg daily added which may help with pain from inflammatory process. Reports pain better today and participated better in therapies.   June 27 - continue present regimen  July 1-home on oxycodone 5 mg p.o. every 4 hours.  Pain dispense #40, gabapentin 300 mg twice daily, Tylenol 500 mg every 6 hours as needed  July 5-increase gabapentin slightly 300-100-300 mg.  Watch for any myoclonic twitching  July 8-had some lethargy earlier today, patient feels that she is tolerating oxycodone and gabapentin.  Was alert when seen on rounds.        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  Seen by infectious disease service with no acute infectious process noted.  Evaluated by hematology regarding lymphadenopathy, lymph node felt too small to biopsy.  Patient was on a course of low-dose prednisone 10 mg for 3 days then 5 mg for 2 days and then another 10 mg dose with out any significant improvement in the swelling or pain.  She has a history of rheumatoid arthritis.  See CT of the left thigh and MRI of the left thigh and pelvis reports   With lumbar radiculoplexitis and diabetic amyotrophy, on review of the literature do not see edema that she presents with associated with the findings or MRI changes in the tendon and musculature with edema.  There is descriptions of MRI changes in the plexus and peripheral nerve.  In terms of treatment options for diabetic amyotrophy , there have not been definitive clinical trials.  Immunomodulation with steroids has been inconclusive as well as other immunomodulation therapy.  Reviewed with the patient  that  feel that she would benefit from seeing her rheumatologist as an outpatient to evaluate this further, as there does appear to be inflammatory cause given the lymphadenopathy and edema.  Rheumatologist does not come to the hospital.  CPK x2 has been normal.  Aldolase has been normal.  May need to look at biopsy of left thigh muscle to assess further.   June 8-patient reviewed with neurology yesterday who will assess and also provide input regarding whether muscle biopsy may be helpful.  June 9 - Discussed with Neurology and Rheumatology - plan is for EMG and then muscle biopsy.   Dana 15 - Left quadricep muscle biopsy was completed 6/15, results pending.  Labs: CKs have  been normal, ANCA panel 6/11 was unremarkable  June 21 - started on full treatment dose of Prednisone 60 mg daily yesterday pending path results. Reports pain better so far today. Specimen sent to [facility]. Clinical impression presently focal inflammatory myositis pending final pathology results.   June 22-pain continues better today.  June 23-outside pathology report still pending  June 24 - Patient reports left thigh pain better over past couple days but still present. Does not have the soreness at medial thigh as before. Complains of pain at mid-thigh. No change in swelling. Does have discomfort with proximal movement in LLE. Walked 320 feet CTG SBA RW today.   Pain improved on steroid. Discontinued atorvastatin. Biopsy results returned from [facility] - see report -Type 2 fiber atrophy, advanced. Denervation/reinnervation. No evidence of inflammatory myopathy or vasculitis. [facility] lab pursuing a dystrophy panel and will report findings in an addendum.  Reviewed with Neurology - as shown symptomatic response, continue Prednisone 60 mg daily for about one more week, then taper off over month.   June 27 - continue present regimen  June 28-increased pain medication regimen to oxycodone 5 mg every 4 hours as needed for severe pain.   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 Treatment would be aspirin which she is already on. Discussed with Neurology and as been on Prednisone 60 mg for only 1.5 week, will stop and not do taper. Discussed with Internal Medicine who will adjust insulin.  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  Reviewed with the patient that treatment for this is aspirin which she is already on.  She may do walking activities but would avoid strenuous activities with left quadricep strengthening.   She may strengthen the other 3 extremities as tolerated.  Reviewed may take 3 months to resolve.  July 7-continues with left thigh pain.  She is noted to have increased edema in both lower extremities but particularly the left thigh compared to recent.  Lokelma has been associated with fluid retention which may explain increase edema in part.  Continues on aspirin for suspected diabetic muscular infarct.  No update report yet on additional studies for muscle biopsy.  Initial report was June 24 with additional studies planned  July 11-updated MRI imaging of the left thigh this past weekend.  See report.  She has appointment with rheumatology for further assessment later this week. -will recheck CK level for updated baseline as about 4 weeks out now from her biopsy which could have affected level.  Previous CK levels were not elevated.  White blood cell count has normalized which would hold against any active infection.  Edema at the left thigh appears about the same.  Continues with pain on the left side with pain inhibition with proximal muscles.  July 12-CPK was 39 yesterday.  Patient reviewed with pathologist at [facility] today-no additional results yet-they are to call when have additional results  July 15-appointment with rheumatology this afternoon.  Patient reviewed with rheumatologist earlier today.  No additional records update yet on additional studies muscle biopsy  July 16 - Reviewed with Rheum today. Thought remains diabetic muscle infarct, continue ASA.  Start Prednisone 5mg to see if rheumatoid arthritis contributing to some pain and then f/up with Rheum outpatient for biologic agent.  July 19-does not notice any significant change in pain in his left thigh    # HTN   Coreg  Clonidine  Hydralazine  Losartan  Spironolactone  Nephrology/internal medicine following     #Hypothyroidism  Levothyroxine  June 9 - recheck TSH- addendum Dana 10- On Nov 30, 2021 , on Dec 2, 2021 T4 = 0.90, on  Dec 9, T4=1.68. On May 7, TSH = 134. Has been on Levothyroxine 125 mcg/day it appears since at least Dec 13, 2021. See Dr Templeton's discharge note from Dec 17,2021 which discusses thyroid labs/dosing at that time. On June 9, 2022 TSH = 54.4.   Check free T4. Will get evaluation from Internal Medicine regarding thyroid studies/levothyroxine dosing.  June 11 - levothyroxine increased to 150mcg daily-continue this dose and she will need follow up TSH in 4-6 weeks from June 11 as an outpatient  July 11-repeat TSH and T4 ordered for Monday, July 11 July 12-TSH still elevated, higher at 96.  Levothyroxine increased to 200 mcg daily and separate from other meds.  Free T4 equal 0.68     #CAD  ASA held in setting of ICH - restart aspirin 81 mg daily on May 20, 2022 per Neurology     #Depression   Sertraline  June 28-sertraline dose increased  July 5-reviewed with psychiatry service-continue present regimen    Insomnia-improved on Ambien 5 mg  July 6-fatigue during the day-decrease Ambien 2.5 mg nightly and assess response  July 7-tolerated Ambien 2.5    #GERD   PPI     #Restless leg syndrome  Requip     #DVT prophylaxis-SCDs ordered but patient refusing.  Per neurology note May 11-continue off anticoagulation/antiplatelet for now. Restarted ASA 81 mg on May 20.  May 16-reviewed with patient and try venous foot compression devices  May 20-also not able to tolerate venous foot compression devices.  May 25-bilateral lower extremity venous duplex negative for DVT  July 9-  venous duplex negative for DVT left lower extremity    Diarrhea-July 7-loose stool x5 over the last day.  Lokelma is not associated with diarrhea.  C. difficile was negative on July 5.  Was on cefdinir from June 7 to June 13 and cefazolin one-time dose on Dana 15.  - will recheck C. Difficile  July 11-C. difficile was negative x2.  Diarrhea resolved        -  comprehensive inpatient rehabilitation program working with PT, OT, SLP for a minimum of three hours  per day, five days per week. - will monitor for progress     TEAM CONF - MAY 17- BED SBA. TRANSFER MIN. 4 STAIRS MIN. GAIT 160 FEET CTG RW. SLOW TO PROCESS. BATH MIN. LBD MIN. UBD MIN. GROOMING SET UP. TOILETING MIN . COGNITION OVERALL MILD DEFICITS. VISUAL IMPAIRMENT IMPACTS SOME TESTING. ESRD-HD. ANTIBIOTICS - VANCOMYCIN 10.90 YESTERDAY.  ELOS - 1-2 WEEKS.      TEAM CONF - MAY 24 - ALWAYS SO PROCESSING AFTER EACH DIALYSIS SESSION. AT HOME WOULD GO BACK TO HOME AND SLEEP. BED MIN ASSIST. TRANSFERS MIN. GAIT 80   FEET RW CTG. 4 STAIRS CTG. TOILET TRASNFERS AND SHOWER TRANSFERS MIN CTG. BATH CTG. LBD CTG. UBD SET UP. GROOMING SET UP. TOILETING CTG.   LEFT GROIN - ADDUCTOR PAIN - There is mild joint space narrowing involving both hips symmetrically. There are also some minimal degenerative changes involving both hips. The overall appearance is similar to the study of 12/16/2021. MODERATE WORD RETRIEVAL DEFICITS. IMPAIRED VISION AFFECTS HOME MANAGEMENT TASKS. HYPOGLLYCEMIA AFTER SSI .DECREASED TO 0-7 UNITS.   ELOS - ONE WEEK.         TEAM CONF - MAY 31 - TRANSFERS CTG. GAIT 40- FEET CTG RW LIMITED BY THIGH PAIN. TOILET TRANSFERS CTG. BATH CTG. LBD CTG. UBD SET UP. TOILETING CTG.  GROOMING SET UP. COGNITION - MODERATE WORD RETRIEVAL DEFICITS, MODERATE IMPAIRED MEMORY IMPACTED BY FATIGUE, STILL SLOW PROCESSING.  BNE (Active)  Att'n. - Mildly Imp.  Exec. Fx. - Mildly Imp., slowed processing speed  Rsng/Jgmnt - WNL  Arith - Mod Imp.  Visuospatial Skills - DNA, pt declined citing poor vision  Visual Mem. DNA  Verbal Mem. - WNL  Emot - Pt endorsed mild dysphoria and anxiousness secodnary to current inpatient  Stay.  CONTINENT BOWEL. OCCASIONAL VOID. ESRD-HD. ON INSULIN. SKIN INTACT. LIDODERM PATCH TO LEFT ADDUCTOR TENDON. COURSE OF STEROID FOR LEFT THIGH JEREMI. CONSULTED ORTHO FOR INPUT.  ELOS - POSSIBLY Thursday DEPENDING ON FURTHER EVALUATIONS.            TEAM CONF - JUNE 21 -  DECLINED THERAPY DUE TO PAIN.  AT  MOST RECENT THERAPY WHEN PARTICIPATED, TRANSFERS MIN. GAIT 80 FEET MIN / CTG MIN ASSIST RW. TOILET TRANSFERS CTG. TOILETING CTG. MODERATE IMPAIRED VERBAL MEMORY. PAIN MANAGEMENT - MEDS ADJUSTED - OXYCODONE 2.5 MG Q 6 HOURS PRN, TRAMADOL 25 MG Q 4 HOURS. MUSCLE BIOPSY RESULTS PENDING. STARTING ON PREDNISONE 60 MG DAILY YESTERDAY. WILL NEED TO ADJUST INSULING, BLOOD GLUCOSE > 300 THIS AM. GASTROENTEROLOGY EVALUATING LIVER. PATIENT DECLINES LIVER BIOPSY.  HYPOTHYROID - levothyroxine increased to 150mcg daily-continue this dose and she will need follow up TSH in 4-6 weeks from June 11 as an outpatient  ELOS -   1.5 WEEKS     TEAM CONF - June 28 - BED MIN  SIT TO SUPINE, SBA SUPINE TO SIT. CAR TRANSFERS CTG. TRANSFERS MIN ASSIST. 4 STAIRS MIN. GAIT 240 FEET CTG SBA. TOILET TRANSFERS MIN. UBB SBA. LBB MIN WITH LLE. UBD SET UP. LBD MOD ASSIST LLE.   INSULIN ADJUSTED FOR ELEVATED BLOOD GLUCOSE ON STEROIDS. ON PREDNISONE 60 MG DAILY AND PLAN TO TAPER OVER NEXT MONTH . BIOPSY REPORT SENT TO OUTPATIENT RHEUMATOLOGIST YESTERDAY. ADDITIONAL STUDIES ON BIOPSY PENDING.   BLADDER SCAN 400 CC BUT ONLY 200 CC ON INTERMITTENT STRAIGHT CATH. LEFT QUAD MUSCLE BIOPSY SITE HEALING.  ESRD - HD.   ELOS - Thursday WITH HOME HEALTH PT, OT, AND NURSING FOR DIABETES AND MONITORING ON STEROIDS.   Addendum-was not able to do car transfer after hemodialysis on Friday, July 1 and continued with inpatient rehab course.    TEAM CONF - July 12 - BED MOD. TRASNFER MIN, AT TIMES MIN-MOD. GAIT CTG RW 60 FEET. LAST WORKED ON STAIRS July 2 4 STAIRS MIN ASSIST. TOILET TRANSFERS MIN. UBB SBA. LBB MIN MOD. UBD SET UP. LBD MOD MAX. OXYCODONE AND GABAPENTIN FOR PAIN. LEFT HYDROURETERNEPHROSIS. - CATHETERIZE ONCE A DAY. ON FLOMAX. HAS A RASH ON BACK/BUTTOCK - MICONAZOLE CREAM. APPOINTMENT WITH DR Cortez - RHEUMATOLOGY - ON Friday July 15. CK = 39 YESTERDAY. THYROID RECHECK - TSH 96.1 AND FREE T4 0.68 YESTERDAY - INTERNAL MEDICINE FOLLOWING.   ELOS - LOOKING AT  SUBACUTE OPTIONS.     Rehabilitation-July 7-Patient has not been able to participate in therapy for 3 hours a day.  Will ask internal medicine to assess for transfer to the acute care wing in the hospital  July 8-Was evaluated by internal medicine service who did not feel that she needed transfer to the acute care wing of the hospit    Adonis Florentino MD       During rounds, used appropriate personal protective equipment including mask and gloves.  Additional gown if indicated.  Mask used was standard procedure mask. Appropriate PPE was worn during the entire visit.  Hand hygiene was completed before and after.

## 2022-07-20 LAB
ANION GAP SERPL CALCULATED.3IONS-SCNC: 8 MMOL/L (ref 5–15)
BUN SERPL-MCNC: 10 MG/DL (ref 6–20)
BUN/CREAT SERPL: 6.8 (ref 7–25)
CALCIUM SPEC-SCNC: 8.6 MG/DL (ref 8.6–10.5)
CHLORIDE SERPL-SCNC: 98 MMOL/L (ref 98–107)
CO2 SERPL-SCNC: 28 MMOL/L (ref 22–29)
CREAT SERPL-MCNC: 1.47 MG/DL (ref 0.57–1)
DEPRECATED RDW RBC AUTO: 52 FL (ref 37–54)
EGFRCR SERPLBLD CKD-EPI 2021: 41.7 ML/MIN/1.73
ERYTHROCYTE [DISTWIDTH] IN BLOOD BY AUTOMATED COUNT: 17.4 % (ref 12.3–15.4)
GLUCOSE BLDC GLUCOMTR-MCNC: 165 MG/DL (ref 70–130)
GLUCOSE BLDC GLUCOMTR-MCNC: 295 MG/DL (ref 70–130)
GLUCOSE BLDC GLUCOMTR-MCNC: 325 MG/DL (ref 70–130)
GLUCOSE BLDC GLUCOMTR-MCNC: 88 MG/DL (ref 70–130)
GLUCOSE SERPL-MCNC: 82 MG/DL (ref 65–99)
HCT VFR BLD AUTO: 30.5 % (ref 34–46.6)
HGB BLD-MCNC: 9.2 G/DL (ref 12–15.9)
MCH RBC QN AUTO: 24.9 PG (ref 26.6–33)
MCHC RBC AUTO-ENTMCNC: 30.2 G/DL (ref 31.5–35.7)
MCV RBC AUTO: 82.7 FL (ref 79–97)
PLATELET # BLD AUTO: 272 10*3/MM3 (ref 140–450)
PMV BLD AUTO: 9.6 FL (ref 6–12)
POTASSIUM SERPL-SCNC: 4 MMOL/L (ref 3.5–5.2)
RBC # BLD AUTO: 3.69 10*6/MM3 (ref 3.77–5.28)
SODIUM SERPL-SCNC: 134 MMOL/L (ref 136–145)
WBC NRBC COR # BLD: 10.15 10*3/MM3 (ref 3.4–10.8)

## 2022-07-20 PROCEDURE — 82962 GLUCOSE BLOOD TEST: CPT

## 2022-07-20 PROCEDURE — 97110 THERAPEUTIC EXERCISES: CPT

## 2022-07-20 PROCEDURE — 63710000001 INSULIN LISPRO (HUMAN) PER 5 UNITS: Performed by: NURSE PRACTITIONER

## 2022-07-20 PROCEDURE — 63710000001 PREDNISONE PER 5 MG: Performed by: PHYSICAL MEDICINE & REHABILITATION

## 2022-07-20 PROCEDURE — 80048 BASIC METABOLIC PNL TOTAL CA: CPT | Performed by: NURSE PRACTITIONER

## 2022-07-20 PROCEDURE — 85027 COMPLETE CBC AUTOMATED: CPT | Performed by: NURSE PRACTITIONER

## 2022-07-20 PROCEDURE — 97535 SELF CARE MNGMENT TRAINING: CPT

## 2022-07-20 RX ORDER — SPIRONOLACTONE 100 MG/1
100 TABLET, FILM COATED ORAL DAILY
Status: DISCONTINUED | OUTPATIENT
Start: 2022-07-21 | End: 2022-07-20 | Stop reason: SDUPTHER

## 2022-07-20 RX ORDER — SPIRONOLACTONE 100 MG/1
100 TABLET, FILM COATED ORAL DAILY
Status: DISCONTINUED | OUTPATIENT
Start: 2022-07-20 | End: 2022-07-22 | Stop reason: HOSPADM

## 2022-07-20 RX ADMIN — CARVEDILOL 25 MG: 25 TABLET, FILM COATED ORAL at 08:34

## 2022-07-20 RX ADMIN — OXYCODONE 5 MG: 5 TABLET ORAL at 20:29

## 2022-07-20 RX ADMIN — ASPIRIN 81 MG: 81 TABLET, COATED ORAL at 08:14

## 2022-07-20 RX ADMIN — SPIRONOLACTONE 100 MG: 100 TABLET ORAL at 20:28

## 2022-07-20 RX ADMIN — HYDRALAZINE HYDROCHLORIDE 100 MG: 50 TABLET, FILM COATED ORAL at 05:44

## 2022-07-20 RX ADMIN — CLONIDINE HYDROCHLORIDE 0.3 MG: 0.1 TABLET ORAL at 14:00

## 2022-07-20 RX ADMIN — GABAPENTIN 300 MG: 300 CAPSULE ORAL at 08:33

## 2022-07-20 RX ADMIN — INSULIN GLARGINE-YFGN 10 UNITS: 100 INJECTION, SOLUTION SUBCUTANEOUS at 08:12

## 2022-07-20 RX ADMIN — PANTOPRAZOLE SODIUM 40 MG: 40 TABLET, DELAYED RELEASE ORAL at 05:45

## 2022-07-20 RX ADMIN — SERTRALINE 150 MG: 100 TABLET, FILM COATED ORAL at 08:36

## 2022-07-20 RX ADMIN — OXYCODONE 5 MG: 5 TABLET ORAL at 02:40

## 2022-07-20 RX ADMIN — LOSARTAN POTASSIUM 100 MG: 100 TABLET, FILM COATED ORAL at 08:36

## 2022-07-20 RX ADMIN — SPIRONOLACTONE 50 MG: 50 TABLET, FILM COATED ORAL at 08:34

## 2022-07-20 RX ADMIN — LIDOCAINE 1 PATCH: 50 PATCH CUTANEOUS at 08:36

## 2022-07-20 RX ADMIN — GABAPENTIN 300 MG: 300 CAPSULE ORAL at 23:23

## 2022-07-20 RX ADMIN — TAMSULOSIN HYDROCHLORIDE 0.4 MG: 0.4 CAPSULE ORAL at 08:37

## 2022-07-20 RX ADMIN — OXYCODONE 5 MG: 5 TABLET ORAL at 13:58

## 2022-07-20 RX ADMIN — Medication 1 TABLET: at 08:35

## 2022-07-20 RX ADMIN — HYDRALAZINE HYDROCHLORIDE 100 MG: 50 TABLET, FILM COATED ORAL at 23:23

## 2022-07-20 RX ADMIN — INSULIN LISPRO 3 UNITS: 100 INJECTION, SOLUTION INTRAVENOUS; SUBCUTANEOUS at 12:29

## 2022-07-20 RX ADMIN — PREDNISONE 5 MG: 10 TABLET ORAL at 08:38

## 2022-07-20 RX ADMIN — CLONIDINE HYDROCHLORIDE 0.3 MG: 0.1 TABLET ORAL at 05:44

## 2022-07-20 RX ADMIN — ROPINIROLE HYDROCHLORIDE 0.75 MG: 0.5 TABLET, FILM COATED ORAL at 23:23

## 2022-07-20 RX ADMIN — OXYCODONE 5 MG: 5 TABLET ORAL at 08:33

## 2022-07-20 RX ADMIN — CLONIDINE HYDROCHLORIDE 0.3 MG: 0.1 TABLET ORAL at 23:23

## 2022-07-20 RX ADMIN — INSULIN LISPRO 10 UNITS: 100 INJECTION, SOLUTION INTRAVENOUS; SUBCUTANEOUS at 08:11

## 2022-07-20 RX ADMIN — SODIUM ZIRCONIUM CYCLOSILICATE 5 G: 5 POWDER, FOR SUSPENSION ORAL at 08:37

## 2022-07-20 RX ADMIN — LEVOTHYROXINE SODIUM 200 MCG: 0.1 TABLET ORAL at 05:44

## 2022-07-20 RX ADMIN — CARVEDILOL 25 MG: 25 TABLET, FILM COATED ORAL at 20:28

## 2022-07-20 RX ADMIN — GABAPENTIN 100 MG: 100 CAPSULE ORAL at 14:00

## 2022-07-20 NOTE — PROGRESS NOTES
CC: Debility    SUBJECTIVE:     Seen on HD  Left thigh feels same  Reports walked better in PT today. Transfers CTG min. Gait 80 feet min assist.        OBJECTIVE:  Vitals:    07/20/22 1356   BP: 170/77   Pulse: 55   Resp: 18   Temp: 98 °F (36.7 °C)   SpO2: 95%       GENERAL: NAD  MENTAL STATUS: Awake alert  HEENT:  NCAT  CARDIOVASCULAR: Sinus rhythm    CHEST:  hemodialysis access right chest  RESPIRATORY: Normal respirations.      ABDOMEN: Nondistended.  Normoactive bowel sounds    EXTREMITIES: Left thigh edema .  Edema at the left thigh appears similar..  Also has some edema in the left lower leg as well as right lower leg      No myoclonus.  NEUROLOGIC:    ON HD      Scheduled Meds:aspirin, 81 mg, Oral, Daily  b complex-vitamin c-folic acid, 1 tablet, Oral, Daily  carvedilol, 25 mg, Oral, BID With Meals  cloNIDine, 0.3 mg, Oral, Q8H  gabapentin, 100 mg, Oral, Q24H  gabapentin, 300 mg, Oral, BID  hydrALAZINE, 100 mg, Oral, Q8H  insulin glargine, 10 Units, Subcutaneous, Q12H  insulin lispro, 0-14 Units, Subcutaneous, TID AC  levothyroxine, 200 mcg, Oral, Q AM  lidocaine, 1 patch, Transdermal, Q24H  losartan, 100 mg, Oral, Q24H  miconazole, 1 application, Topical, Q12H  pantoprazole, 40 mg, Oral, Q AM  predniSONE, 5 mg, Oral, Daily With Breakfast  rOPINIRole, 0.75 mg, Oral, Nightly  sertraline, 150 mg, Oral, Daily  sodium zirconium cyclosilicate, 5 g, Oral, Daily  spironolactone, 100 mg, Oral, Daily  tamsulosin, 0.4 mg, Oral, Daily      Continuous Infusions:   PRN Meds:.•  acetaminophen  •  dextrose  •  dextrose  •  diphenoxylate-atropine  •  glucagon (human recombinant)  •  heparin (porcine)  •  hydrALAZINE  •  nitroglycerin  •  oxyCODONE  •  oxyCODONE  •  polyethylene glycol  •  sodium chloride  •  zolpidem    Glucose   Date/Time Value Ref Range Status   07/20/2022 1122 165 (H) 70 - 130 mg/dL Final     Comment:     Meter: [id number] : [id number] Randolph SAAVEDRA   07/20/2022 0617 325 (H) 70 - 130 mg/dL Final      Comment:     Meter: [id number] : [id number] León SAAVEDRA   07/20/2022 0227 295 (H) 70 - 130 mg/dL Final     Comment:     Meter: [id number] : [id number] Chico Rogers NA   07/19/2022 2031 279 (H) 70 - 130 mg/dL Final     Comment:     Meter: [id number] : [id number] León SAAVEDRA   07/19/2022 1613 173 (H) 70 - 130 mg/dL Final     Comment:     Meter: [id number] : [id number] Anup Martin NA   07/19/2022 0614 267 (H) 70 - 130 mg/dL Final     Comment:     Meter: [id number] : [id number] León SAAVEDRA   07/19/2022 0154 280 (H) 70 - 130 mg/dL Final     Comment:     Meter: [id number] : [id number] Frank Dunn RN   07/18/2022 2041 260 (H) 70 - 130 mg/dL Final     Comment:     Meter: [id number] : [id number] León Young      Results from last 7 days   Lab Units 07/18/22  1533 07/15/22  2207   WBC 10*3/mm3 11.46* 11.29*   HEMOGLOBIN g/dL 8.4* 8.5*   HEMATOCRIT % 27.2* 27.4*   PLATELETS 10*3/mm3 239 187     Results from last 7 days   Lab Units 07/18/22  1533 07/15/22  2207   SODIUM mmol/L 126* 134*   POTASSIUM mmol/L 5.3* 4.2   CHLORIDE mmol/L 94* 98   CO2 mmol/L 20.0* 25.0   BUN mg/dL 42* 15   CREATININE mg/dL 3.84* 2.03*   CALCIUM mg/dL 7.7* 8.2*   BILIRUBIN mg/dL  --  0.5   ALK PHOS U/L  --  451*   ALT (SGPT) U/L  --  16   AST (SGOT) U/L  --  36*   GLUCOSE mg/dL 161* 131*      Latest Reference Range & Units 05/27/22 11:30   Creatine Kinase 20 - 180 U/L 94   Aldolase 3.3 - 10.3 U/L 5.6       Imaging Results (Last 24 Hours)     ** No results found for the last 24 hours. **          ASSESSMENT AND PLAN    # R MCA s/p TPA with subsequent ICH with debility  Initially held antiplatelet/anticoagulation.  Reviewed with neurology on May 20 and resume aspirin 81 mg daily  SBP goal <160  Recommend outpatient Neurology fu - repeat MRI in 3 months        # DM  June 27-hyperglycemia on steroids.  Lantus increased to 30 units twice daily, Humalog 5 units 3 times daily with meals, and all increase  sliding scale coverage  June 28 -hypoglycemic after increasing insulin dosing.  Blood glucose up to 98 prior to lunch, will hold sliding scale insulin and give 5 units scheduled with meal  June 30-hypoglycemia-Lantus twice daily decreased from 35 to 20 units.  Scheduled Humalog with meals discontinued  July 1-prednisone has been discontinued.  Reviewed with internal medicine and Lantus decreased to 10 units twice daily and on discharge home to resume her home regimen of Lantus 10 units daily.  She will check her sugar tonight at home and if elevated give Lantus 10 units tonight and then continue with the Lantus 10 units daily tomorrow.  Reviewed with patient and her  that her sugars may be elevated initially as she just completed prednisone.  They were instructed to call for any additional instructions on adjustment in regimen if it remains elevated at home this weekend  July 18 - blood glucose increased on Prednisone 5 mg daily  July 19-Lantus adjusted  July 20 - Lantus increased 10 units bid     # ESRD   Nephrology following  Inpatient HD    Flomax  Hyperkalemia  July 5-patient had hemodialysis on July 3 and July 4 for hyperkalemia.  Lokelma resumed by nephrology  July 7-had hemodialysis yesterday and has plans for hemodialysis again today  July 8-hemodialysis again today     Infectious disease-   # s/p catheter infection with MRSA  Vancomycin IV with stop date of May 26  May 24-vancomycin random equal 12.90-on vancomycin 500 mg IV on Zmlccaq-Akkujxhi-Jmqlzoir  May 26-to complete course of vancomycin today  #Urinary tract infection-on Dana 3 urinalysis was negative for infection but noted to have leukocytosis increase and repeat urinalysis on June 6 shows findings consistent with urinary tract infection.  Placed on a course of cefdinir renal dose 3 mg daily for 7 days.  No costophrenic angle tenderness patient reviewed with infectious disease service.  Leukocytosis felt related to the bladder infection and  not previous catheter infection.  June 8-urine culture results pending.  On cefdinir.  Culture with E. coli, sensitivity pending  June 9 - E coli sensitive to cephalosporins.  June 21 - continues with leukocytosis WBC  15K today. May be from inflammatory process. Steroids added yesterday.   June 22-urine described as milky characteristic, different from previous-recheck urinalysis with culture if indicated  June 23-Labs are still pending today.  Urinalysis also pending as she does not make much urine.  She had a temperature of 100.2 last evening, afebrile this morning.  June 27 - abnormal UA but urine culture from June 24 no growth  July 1-leukocytosis felt related to the noninfectious process in the left thigh as well as secondary to steroids  July 5-WBC trending down to 15.6 today  July 6-WBC trending down to 12 K  July 7-WBC 8.4     #left hydroureteronephrosis-Dana 3-CT scan shows left hydro ureter nephrosis.  She had some previous dilation of the ureter on comparison the past studies but increased today.  Bladder noted to be markedly distended.  Will do in and out cath now and daily to decompress bladder.  Addendum-intermittent cath for 600 cc.  June 4-once daily intermittent cath 450 cc.  June 5-seen by urology-Nguyễn catheter placed with plans to recheck hydroureter on renal ultrasound in 3 to 5 days.  June 6-Nguyễn catheter placed yesterday by urology, with only 170 cc out so far.  Patient reports did not note any change in her left groin pain after the in and out cath with decompression of the bladder..  June 8-reviewed with urology service-request noncontrast CT scan stone protocol to evaluate for resolution of the left hydronephrosis with urology planning to see tomorrow to review the films and then make recommendations, urology would recommend leaving the Nguyễn catheter in for the time being.  June 9 - improved left hydroureter on CT , Nguyễn discontinued.   July 1-to follow-up with urology as an outpatient  with follow-up CT planned in August.  She occasionally required intermittent straight cath but this was very infrequent.  Home health nursing to follow.  Continues on Flomax  July 7-intermittent straight cath 400 cc  July 11-She continues with hydroureteronephrosis on the left side on follow-up CT scan-intermittent straight cath volumes have not been over about 400 cc over the past month.  Urology re-consulted for updated assessment  July 12-plan is intermittent straight cath routinely once or twice a day  July 14 - ISC for 150 cc         #Anemia-  EPO.  June 6-hemoglobin 7.8  July 12-hemoglobin 8.0     Lymphadenopathy-evaluated by hematology oncology while here.  No significant change from scans earlier this year.  She is to repeat a scan in 3 months and follow-up with hematology oncology  July 1157-jdyzuz-pm CT of the abdomen pelvis shows lymphadenopathy overall about the same per her report.  To have follow-up with hematology oncology and repeat scan in another 2 months or so     #Elevated alkaline phosphatase  June 6-elevated alkaline phosphatase 770, up from 289 one week ago, 140 one month ago. Similar elevation in March, Feb even higher at 1,330 ( intra-abdominal fluid collection, underwent CT-guided aspiration  Revealed blood and cultures did not reveal any growth and therefore antibiotics  discontinued.  Surgery also did evaluate and signed off.), and elevated during admission in January ( She was seen by general surgery who recommended and performed laparoscopic cholecystectomy during that admission).   ALT 70, AST 87, Total bilirubin 0.8. Ca 8.3.  ? If liver source or bone or due to an inflammatory response.  June 7-GGT also elevated.  Seen by gastroenterology.  [place] biliary source.  Liver ultrasound ordered  June 8-liver ultrasound was unremarkable  June 21 - patient declined liver biopsy.   June 27-gastroenterology following peripherally-plans to follow-up as an outpatient and review option of liver biopsy  again if alkaline phosphatase remains elevated  June 28-remains elevated at 503  June 30-alkaline phosphatase lower at 426  July 6-trend back up to 634  July 8-alkaline phosphatase unchanged 627  July 12-alkaline phosphatase 551  July 15 - alkaline phosphatase 451     #Pain - neuropathy BL lower extremity/left thigh pain  Gabapentin/oxycodone.    June 7-patient with lethargy this morning.  May be due to urinary tract infection but with recent increase and gabapentin and oxycodone, will change gabapentin to 200 mg twice a day and decrease oxycodone from 5 back down to 2.5 mg p.o. every 4 hours as needed  June 8-better speed with her processing today  June 21 - pain med regimen recently adjusted with tramadol 25 mg q 4 hours prn, gabapentin 300 g bid, lidoderm patch, oxycodone 2.5 mg q 6 hours prn. Prednisone 60 mg daily added which may help with pain from inflammatory process. Reports pain better today and participated better in therapies.   June 27 - continue present regimen  July 1-home on oxycodone 5 mg p.o. every 4 hours.  Pain dispense #40, gabapentin 300 mg twice daily, Tylenol 500 mg every 6 hours as needed  July 5-increase gabapentin slightly 300-100-300 mg.  Watch for any myoclonic twitching  July 8-had some lethargy earlier today, patient feels that she is tolerating oxycodone and gabapentin.  Was alert when seen on rounds.        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  Seen by infectious disease service with no acute infectious process noted.  Evaluated by hematology regarding  lymphadenopathy, lymph node felt too small to biopsy.  Patient was on a course of low-dose prednisone 10 mg for 3 days then 5 mg for 2 days and then another 10 mg dose with out any significant improvement in the swelling or pain.  She has a history of rheumatoid arthritis.  See CT of the left thigh and MRI of the left thigh and pelvis reports   With lumbar radiculoplexitis and diabetic amyotrophy, on review of the literature do not see edema that she presents with associated with the findings or MRI changes in the tendon and musculature with edema.  There is descriptions of MRI changes in the plexus and peripheral nerve.  In terms of treatment options for diabetic amyotrophy , there have not been definitive clinical trials.  Immunomodulation with steroids has been inconclusive as well as other immunomodulation therapy.  Reviewed with the patient  that  feel that she would benefit from seeing her rheumatologist as an outpatient to evaluate this further, as there does appear to be inflammatory cause given the lymphadenopathy and edema.  Rheumatologist does not come to the hospital.  CPK x2 has been normal.  Aldolase has been normal.  May need to look at biopsy of left thigh muscle to assess further.   June 8-patient reviewed with neurology yesterday who will assess and also provide input regarding whether muscle biopsy may be helpful.  June 9 - Discussed with Neurology and Rheumatology - plan is for EMG and then muscle biopsy.   Dana 15 - Left quadricep muscle biopsy was completed 6/15, results pending.  Labs: CKs have been normal, ANCA panel 6/11 was unremarkable  June 21 - started on full treatment dose of Prednisone 60 mg daily yesterday pending path results. Reports pain better so far today. Specimen sent to [facility]. Clinical impression presently focal inflammatory myositis pending final pathology results.   June 22-pain continues better today.  June 23-outside pathology report still pending  June  24 - Patient reports left thigh pain better over past couple days but still present. Does not have the soreness at medial thigh as before. Complains of pain at mid-thigh. No change in swelling. Does have discomfort with proximal movement in LLE. Walked 320 feet CTG SBA RW today.   Pain improved on steroid. Discontinued atorvastatin. Biopsy results returned from [facility] - see report -Type 2 fiber atrophy, advanced. Denervation/reinnervation. No evidence of inflammatory myopathy or vasculitis. [facility] lab pursuing a dystrophy panel and will report findings in an addendum.  Reviewed with Neurology - as shown symptomatic response, continue Prednisone 60 mg daily for about one more week, then taper off over month.   June 27 - continue present regimen  June 28-increased pain medication regimen to oxycodone 5 mg every 4 hours as needed for severe pain.   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 Treatment would be aspirin which she is already on. Discussed with Neurology and as been on Prednisone 60 mg for only 1.5 week, will stop and not do taper. Discussed with Internal Medicine who will adjust insulin.  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  Reviewed with the patient that treatment for this is aspirin which she is already on.  She may do walking activities but would avoid strenuous activities with left quadricep strengthening.  She may strengthen the other 3 extremities as tolerated.  Reviewed may take 3 months to resolve.  July 7-continues with left thigh pain.  She is noted to have increased edema in both lower extremities but particularly the left thigh compared to recent.  Lokelma has been associated with fluid retention which may explain increase edema in part.  Continues on aspirin for suspected diabetic muscular  infarct.  No update report yet on additional studies for muscle biopsy.  Initial report was June 24 with additional studies planned  July 11-updated MRI imaging of the left thigh this past weekend.  See report.  She has appointment with rheumatology for further assessment later this week. -will recheck CK level for updated baseline as about 4 weeks out now from her biopsy which could have affected level.  Previous CK levels were not elevated.  White blood cell count has normalized which would hold against any active infection.  Edema at the left thigh appears about the same.  Continues with pain on the left side with pain inhibition with proximal muscles.  July 12-CPK was 39 yesterday.  Patient reviewed with pathologist at [facility] today-no additional results yet-they are to call when have additional results  July 15-appointment with rheumatology this afternoon.  Patient reviewed with rheumatologist earlier today.  No additional records update yet on additional studies muscle biopsy  July 16 - Reviewed with Rheum today. Thought remains diabetic muscle infarct, continue ASA.  Start Prednisone 5mg to see if rheumatoid arthritis contributing to some pain and then f/up with Rheum outpatient for biologic agent.  July 19-does not notice any significant change in pain in his left thigh    # HTN   Coreg  Clonidine  Hydralazine  Losartan  Spironolactone  Nephrology/internal medicine following     #Hypothyroidism  Levothyroxine  June 9 - recheck TSH- addendum Dana 10- On Nov 30, 2021 , on Dec 2, 2021 T4 = 0.90, on Dec 9, T4=1.68. On May 7, TSH = 134. Has been on Levothyroxine 125 mcg/day it appears since at least Dec 13, 2021. See Dr Templeton's discharge note from Dec 17,2021 which discusses thyroid labs/dosing at that time. On June 9, 2022 TSH = 54.4.   Check free T4. Will get evaluation from Internal Medicine regarding thyroid studies/levothyroxine dosing.  June 11 - levothyroxine increased to 150mcg  daily-continue this dose and she will need follow up TSH in 4-6 weeks from June 11 as an outpatient  July 11-repeat TSH and T4 ordered for Monday, July 11 July 12-TSH still elevated, higher at 96.  Levothyroxine increased to 200 mcg daily and separate from other meds.  Free T4 equal 0.68     #CAD  ASA held in setting of ICH - restart aspirin 81 mg daily on May 20, 2022 per Neurology     #Depression   Sertraline  June 28-sertraline dose increased  July 5-reviewed with psychiatry service-continue present regimen    Insomnia-improved on Ambien 5 mg  July 6-fatigue during the day-decrease Ambien 2.5 mg nightly and assess response  July 7-tolerated Ambien 2.5    #GERD   PPI     #Restless leg syndrome  Requip     #DVT prophylaxis-SCDs ordered but patient refusing.  Per neurology note May 11-continue off anticoagulation/antiplatelet for now. Restarted ASA 81 mg on May 20.  May 16-reviewed with patient and try venous foot compression devices  May 20-also not able to tolerate venous foot compression devices.  May 25-bilateral lower extremity venous duplex negative for DVT  July 9-  venous duplex negative for DVT left lower extremity    Diarrhea-July 7-loose stool x5 over the last day.  Lokelma is not associated with diarrhea.  C. difficile was negative on July 5.  Was on cefdinir from June 7 to June 13 and cefazolin one-time dose on Dana 15.  - will recheck C. Difficile  July 11-C. difficile was negative x2.  Diarrhea resolved        -  comprehensive inpatient rehabilitation program working with PT, OT, SLP for a minimum of three hours per day, five days per week. - will monitor for progress     TEAM CONF - MAY 17- BED SBA. TRANSFER MIN. 4 STAIRS MIN. GAIT 160 FEET CTG RW. SLOW TO PROCESS. BATH MIN. LBD MIN. UBD MIN. GROOMING SET UP. TOILETING MIN . COGNITION OVERALL MILD DEFICITS. VISUAL IMPAIRMENT IMPACTS SOME TESTING. ESRD-HD. ANTIBIOTICS - VANCOMYCIN 10.90 YESTERDAY.  ELOS - 1-2 WEEKS.      TEAM CONF - MAY 24 - ALWAYS  SO PROCESSING AFTER EACH DIALYSIS SESSION. AT HOME WOULD GO BACK TO HOME AND SLEEP. BED MIN ASSIST. TRANSFERS MIN. GAIT 80   FEET RW CTG. 4 STAIRS CTG. TOILET TRASNFERS AND SHOWER TRANSFERS MIN CTG. BATH CTG. LBD CTG. UBD SET UP. GROOMING SET UP. TOILETING CTG.   LEFT GROIN - ADDUCTOR PAIN - There is mild joint space narrowing involving both hips symmetrically. There are also some minimal degenerative changes involving both hips. The overall appearance is similar to the study of 12/16/2021. MODERATE WORD RETRIEVAL DEFICITS. IMPAIRED VISION AFFECTS HOME MANAGEMENT TASKS. HYPOGLLYCEMIA AFTER SSI .DECREASED TO 0-7 UNITS.   ELOS - ONE WEEK.         TEAM CONF - MAY 31 - TRANSFERS CTG. GAIT 40- FEET CTG RW LIMITED BY THIGH PAIN. TOILET TRANSFERS CTG. BATH CTG. LBD CTG. UBD SET UP. TOILETING CTG.  GROOMING SET UP. COGNITION - MODERATE WORD RETRIEVAL DEFICITS, MODERATE IMPAIRED MEMORY IMPACTED BY FATIGUE, STILL SLOW PROCESSING.  BNE (Active)  Att'n. - Mildly Imp.  Exec. Fx. - Mildly Imp., slowed processing speed  Rsng/Jgmnt - WNL  Arith - Mod Imp.  Visuospatial Skills - DNA, pt declined citing poor vision  Visual Mem. DNA  Verbal Mem. - WNL  Emot - Pt endorsed mild dysphoria and anxiousness secodnary to current inpatient  Stay.  CONTINENT BOWEL. OCCASIONAL VOID. ESRD-HD. ON INSULIN. SKIN INTACT. LIDODERM PATCH TO LEFT ADDUCTOR TENDON. COURSE OF STEROID FOR LEFT THIGH JEREMI. CONSULTED ORTHO FOR INPUT.  ELOS - POSSIBLY Thursday DEPENDING ON FURTHER EVALUATIONS.            TEAM CONF - JUNE 21 -  DECLINED THERAPY DUE TO PAIN.  AT MOST RECENT THERAPY WHEN PARTICIPATED, TRANSFERS MIN. GAIT 80 FEET MIN / CTG MIN ASSIST RW. TOILET TRANSFERS CTG. TOILETING CTG. MODERATE IMPAIRED VERBAL MEMORY. PAIN MANAGEMENT - MEDS ADJUSTED - OXYCODONE 2.5 MG Q 6 HOURS PRN, TRAMADOL 25 MG Q 4 HOURS. MUSCLE BIOPSY RESULTS PENDING. STARTING ON PREDNISONE 60 MG DAILY YESTERDAY. WILL NEED TO ADJUST INSULING, BLOOD GLUCOSE > 300 THIS  AM. GASTROENTEROLOGY EVALUATING LIVER. PATIENT DECLINES LIVER BIOPSY.  HYPOTHYROID - levothyroxine increased to 150mcg daily-continue this dose and she will need follow up TSH in 4-6 weeks from June 11 as an outpatient  ELOS -   1.5 WEEKS     TEAM CONF - June 28 - BED MIN  SIT TO SUPINE, SBA SUPINE TO SIT. CAR TRANSFERS CTG. TRANSFERS MIN ASSIST. 4 STAIRS MIN. GAIT 240 FEET CTG SBA. TOILET TRANSFERS MIN. UBB SBA. LBB MIN WITH LLE. UBD SET UP. LBD MOD ASSIST LLE.   INSULIN ADJUSTED FOR ELEVATED BLOOD GLUCOSE ON STEROIDS. ON PREDNISONE 60 MG DAILY AND PLAN TO TAPER OVER NEXT MONTH . BIOPSY REPORT SENT TO OUTPATIENT RHEUMATOLOGIST YESTERDAY. ADDITIONAL STUDIES ON BIOPSY PENDING.   BLADDER SCAN 400 CC BUT ONLY 200 CC ON INTERMITTENT STRAIGHT CATH. LEFT QUAD MUSCLE BIOPSY SITE HEALING.  ESRD - HD.   ELOS - Thursday WITH HOME HEALTH PT, OT, AND NURSING FOR DIABETES AND MONITORING ON STEROIDS.   Addendum-was not able to do car transfer after hemodialysis on Friday, July 1 and continued with inpatient rehab course.    TEAM CONF - July 12 - BED MOD. TRASNFER MIN, AT TIMES MIN-MOD. GAIT CTG RW 60 FEET. LAST WORKED ON STAIRS July 2 4 STAIRS MIN ASSIST. TOILET TRANSFERS MIN. UBB SBA. LBB MIN MOD. UBD SET UP. LBD MOD MAX. OXYCODONE AND GABAPENTIN FOR PAIN. LEFT HYDROURETERNEPHROSIS. - CATHETERIZE ONCE A DAY. ON FLOMAX. HAS A RASH ON BACK/BUTTOCK - MICONAZOLE CREAM. APPOINTMENT WITH DR Cortez - RHEUMATOLOGY - ON Friday July 15. CK = 39 YESTERDAY. THYROID RECHECK - TSH 96.1 AND FREE T4 0.68 YESTERDAY - INTERNAL MEDICINE FOLLOWING.   ELOS - LOOKING AT SUBACUTE OPTIONS.     Rehabilitation-July 7-Patient has not been able to participate in therapy for 3 hours a day.  Will ask internal medicine to assess for transfer to the acute care wing in the hospital  July 8-Was evaluated by internal medicine service who did not feel that she needed transfer to the acute care wing of the hospit    Adonis Florentino MD       During rounds, used  appropriate personal protective equipment including mask and gloves.  Additional gown if indicated.  Mask used was standard procedure mask. Appropriate PPE was worn during the entire visit.  Hand hygiene was completed before and after.

## 2022-07-20 NOTE — PLAN OF CARE
Goal Outcome Evaluation:  Plan of Care Reviewed With: patient        Progress: improving  Outcome Evaluation: Pt did void this hisft and had a low bladder scan. No IC needed. Oxy given for L thigh pain. Contact isolation in place. Hydralazine scheduled for 1400 held today per MD order.

## 2022-07-20 NOTE — PROGRESS NOTES
Giuseppe Rene started pre-cert with patient's insurance yesterday afternoon. Still awaiting approval for her admission there from insurance. Discussed with patient this morning. Contacted her outpatient dialysis center to let them know she may be returning there on Friday if d/c to facility tomorrow. Will call them on Friday to update on her d/c.

## 2022-07-20 NOTE — PROGRESS NOTES
Name: Zaina Martinez ADMIT: 2022   : 1965  PCP: Karson Oreilly MD    MRN: [id number] LOS: 68 days   AGE/SEX: 56 y.o. female  ROOM: [facility] [place]          Patient not seen but glucose reviewed, remains elevated on prednisone. Will increase lantus to 10units BID. Continue mof high dose SSI. Will follow      Results Review     I reviewed the patient's new clinical results.    Glucose   Date/Time Value Ref Range Status   2022 1122 165 (H) 70 - 130 mg/dL Final     Comment:     Meter: [id number] : [id number] Randolph Melissa    2022 0617 325 (H) 70 - 130 mg/dL Final     Comment:     Meter: [id number] : [id number] Tra[facility]   2022 0227 295 (H) 70 - 130 mg/dL Final     Comment:     Meter: [id number] : [id number] Louielane Rogers    2022 2031 279 (H) 70 - 130 mg/dL Final     Comment:     Meter: [id number] : [id number] [facility]   2022 1613 173 (H) 70 - 130 mg/dL Final     Comment:     Meter: [id number] : [id number] Anupriley Martin    2022 0614 267 (H) 70 - 130 mg/dL Final     Comment:     Meter: [id number] : [id number] [facility]   2022 0154 280 (H) 70 - 130 mg/dL Final     Comment:     Meter: [id number] : [id number] Frank Dunn RN       No radiology results for the last day  Scheduled Medications  aspirin, 81 mg, Oral, Daily  b complex-vitamin c-folic acid, 1 tablet, Oral, Daily  carvedilol, 25 mg, Oral, BID With Meals  cloNIDine, 0.3 mg, Oral, Q8H  gabapentin, 100 mg, Oral, Q24H  gabapentin, 300 mg, Oral, BID  hydrALAZINE, 100 mg, Oral, Q8H  insulin glargine, 10 Units, Subcutaneous, Q12H  insulin lispro, 0-14 Units, Subcutaneous, TID AC  levothyroxine, 200 mcg, Oral, Q AM  lidocaine, 1 patch, Transdermal, Q24H  losartan, 100 mg, Oral, Q24H  miconazole, 1 application, Topical, Q12H  pantoprazole, 40 mg, Oral, Q AM  predniSONE, 5 mg, Oral, Daily With Breakfast  rOPINIRole, 0.75 mg, Oral, Nightly  sertraline, 150 mg, Oral,  Daily  sodium zirconium cyclosilicate, 5 g, Oral, Daily  spironolactone, 50 mg, Oral, Daily  tamsulosin, 0.4 mg, Oral, Daily    Infusions   Diet  Diet Regular; Thin; Consistent Carbohydrate, Low Potassium, Renal, Daily Fluid Restriction; Other; 1,200; 2 gm (50 mEq)        RIKA Mckay  [facility]  07/20/22  14:40 EDT

## 2022-07-20 NOTE — PLAN OF CARE
Goal Outcome Evaluation:      Slept well. Ambien given for sleep. Oxycodone given for Lt. Leg pain, with some relief. Used ice pack to left thigh. Prn Hydralazine given for elevated SBP.  Glucose 279 tonight. Bladder scanned tonight. Dialysis [facility]. For today.

## 2022-07-20 NOTE — PROGRESS NOTES
[facility] Progress Note      Patient Name: Zaina Martinez  : 1965  MRN: [id number]  Primary Care Physician:  Karson Oreilly MD  Date of admission: 2022    Subjective     Interval History:   Follow up ESRD. .     Seen and examined.no shortness of air or chest pain.    Review of Systems:   Denies chest pain or shortness of breath    Objective     Vitals:   Temp:  [97.8 °F (36.6 °C)-98 °F (36.7 °C)] 98 °F (36.7 °C)  Heart Rate:  [55-62] 55  Resp:  [18] 18  BP: (170-197)/(71-84) 170/77    Intake/Output Summary (Last 24 hours) at 2022 1525  Last data filed at 2022 1635  Gross per 24 hour   Intake 222 ml   Output --   Net 222 ml     Seen on dialysis    Physical Exam:   General:  in bed  Conversant.    HEENT: oral mucosa moist.   Neck:RIJ TDC  Lungs:clear to auscultation. Unlabored.    Heart:RRR no s3 or rub.  / early syst m  Abdomen: + bs,soft, nontender   Extremities:1+ -2+ lower ext edema.    Neuro: speech more fluent . Moves all 4 ext.     Scheduled Meds:     aspirin, 81 mg, Oral, Daily  b complex-vitamin c-folic acid, 1 tablet, Oral, Daily  carvedilol, 25 mg, Oral, BID With Meals  cloNIDine, 0.3 mg, Oral, Q8H  gabapentin, 100 mg, Oral, Q24H  gabapentin, 300 mg, Oral, BID  hydrALAZINE, 100 mg, Oral, Q8H  insulin glargine, 10 Units, Subcutaneous, Q12H  insulin lispro, 0-14 Units, Subcutaneous, TID AC  levothyroxine, 200 mcg, Oral, Q AM  lidocaine, 1 patch, Transdermal, Q24H  losartan, 100 mg, Oral, Q24H  miconazole, 1 application, Topical, Q12H  pantoprazole, 40 mg, Oral, Q AM  predniSONE, 5 mg, Oral, Daily With Breakfast  rOPINIRole, 0.75 mg, Oral, Nightly  sertraline, 150 mg, Oral, Daily  sodium zirconium cyclosilicate, 5 g, Oral, Daily  spironolactone, 50 mg, Oral, Daily  tamsulosin, 0.4 mg, Oral, Daily      IV Meds:        Results Reviewed:   I have personally reviewed the results from the time of this admission to 2022 15:25 EDT     Results from last 7  days   Lab Units 07/18/22  1533 07/15/22  2207   SODIUM mmol/L 126* 134*   POTASSIUM mmol/L 5.3* 4.2   CHLORIDE mmol/L 94* 98   CO2 mmol/L 20.0* 25.0   BUN mg/dL 42* 15   CREATININE mg/dL 3.84* 2.03*   CALCIUM mg/dL 7.7* 8.2*   BILIRUBIN mg/dL  --  0.5   ALK PHOS U/L  --  451*   ALT (SGPT) U/L  --  16   AST (SGOT) U/L  --  36*   GLUCOSE mg/dL 161* 131*       Estimated Creatinine Clearance: 15.1 mL/min (A) (by C-G formula based on SCr of 3.84 mg/dL (H)).                Results from last 7 days   Lab Units 07/18/22 1533 07/15/22  2207   WBC 10*3/mm3 11.46* 11.29*   HEMOGLOBIN g/dL 8.4* 8.5*   PLATELETS 10*3/mm3 239 187             Assessment / Plan     ASSESSMENT:  1.  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 HD MWF.  2.  Intracerebral bleed and altered mental status.  Rehab .   3.  Infected TDC, s/p removal 5/1. Replaced. Staph aureus. completed vancomycin.  4.  DM2    5.  Coronary artery disease  6.  Acute on chronic diastolic dysfunction . Volume off with HD.   7.  Anemia of CKD, on long-acting ANDRAE as an outpatient.    8. Hypothyroidism.  TSH still very high and Free t4 low .  Her medications were adjusted  9. 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 On ASA.     PLAN:    1.  HD today. Aldactone 100 mg po daily as blood pressure is not at goal.  Plan for HD at AM for UF only as well.   We will continue to adjust blood pressure medications as needed after dialysis  2.  Surveillance labs      Ruthann Palmer MD  07/20/22  15:25 EDT    [facility]  [phone]

## 2022-07-20 NOTE — THERAPY TREATMENT NOTE
Inpatient Rehabilitation - Occupational Therapy Treatment Note    [facility]     Patient Name: Zaina Martinez  : 1965  MRN: [id number]    Today's Date: 2022                 Admit Date: 2022         ICD-10-CM ICD-9-CM   1. Generalized weakness  R53.1 780.79   2. Diabetic muscle infarction ([facility])  E11.69 250.80    M62.20 728.89   3. Other insomnia  G47.09 780.52       Patient Active Problem List   Diagnosis   • Renal insufficiency   • Hypertensive disorder   • Hypothyroidism   • Type 2 diabetes mellitus with kidney complication, with long-term current use of insulin ([facility])   • Rheumatoid arthritis ([facility])   • Angioedema   • Esophageal dysmotility   • Anemia   • Medically noncompliant   • Myocardial infarction due to demand ischemia ([facility])   • Enteritis   • PRES (posterior reversible encephalopathy syndrome)   • Urine retention   • Klebsiella infection   • Superficial thrombophlebitis   • Generalized weakness   • ESRD (end stage renal disease) ([facility])   • CAD (coronary artery disease)   • Abnormal urinalysis   • Chronic diastolic CHF (congestive heart failure) ([facility])   • Pyelonephritis   • Calculus of gallbladder with acute on chronic cholecystitis without obstruction   • Pleural effusion on right   • Anemia due to chronic kidney disease, on chronic dialysis ([facility])   • Abnormal findings on diagnostic imaging of other specified body structures   • Acute upper respiratory infection   • Agitation   • Alkaline phosphatase raised   • Casts present in urine   • Cellulitis of toe   • Hip pain   • Community acquired pneumonia   • Depressive disorder   • Diarrhea of presumed infectious origin   • Difficult or painful urination   • Disease due to severe acute respiratory syndrome coronavirus 2 (SARS-CoV-2)   • Dyspnea   • Encounter for follow-up examination after completed treatment for conditions other than malignant neoplasm   • H/O: hypothyroidism   • Hyperlipidemia   • Hypomagnesemia   • Intractable vomiting with  nausea   • Leukocytosis   • Luetscher's syndrome   • Need for influenza vaccination   • Restless legs   • Noncompliance with treatment   • Shoulder pain   • Urinary tract infectious disease   • Metabolic encephalopathy   • Abnormal findings on diagnostic imaging of abdomen   • Status post cholecystectomy   • Hyponatremia   • Leukocytosis   • Acute metabolic encephalopathy   • Encephalopathy, toxic   • Acute CVA (cerebrovascular accident) (HCC)   • Intracranial hemorrhage (HCC)   • Stroke (HCC)   • Abnormal urinalysis   • Diabetic muscle infarction (HCC)   • Other insomnia       Past Medical History:   Diagnosis Date   • Acute CVA (cerebrovascular accident) (HCC) 5/1/2022   • Acute on chronic diastolic CHF (congestive heart failure) (HCC)    • CAD (coronary artery disease) 12/20/2021   • Diabetes (HCC)    • Disease of thyroid gland    • GERD (gastroesophageal reflux disease)    • Hyperlipidemia 11/30/2018   • Hypertension    • Rheumatoid arthritis (HCC)        Past Surgical History:   Procedure Laterality Date   • CHOLECYSTECTOMY WITH INTRAOPERATIVE CHOLANGIOGRAM N/A 1/10/2022    Procedure: Laparoscopic cholecystectomy with intraoperative cholangiogram;  Surgeon: Ramana Raygoza MD;  Location: [facility];  Service: General;  Laterality: N/A;   • EYE SURGERY     • HYSTERECTOMY     • INSERTION HEMODIALYSIS CATHETER N/A 12/6/2021    Procedure: HEMODIALYSIS CATHETER INSERTION;  Surgeon: Keli Salazar MD;  Location: [facility] 18/19;  Service: Vascular;  Laterality: N/A;   • INSERTION HEMODIALYSIS CATHETER N/A 5/3/2022    Procedure: TUNNELED CATHETER PLACEMENT;  Surgeon: Keli Salazar MD;  Location: [facility] OR;  Service: Vascular;  Laterality: N/A;   • MUSCLE BIOPSY Left 6/15/2022    Procedure: Left quadriceps muscle biopsy;  Surgeon: Marques Ma MD;  Location: [facility] OR;  Service: Neurosurgery;  Laterality: Left;             IRF OT ASSESSMENT FLOWSHEET (last 12 hours)     IRF OT  Evaluation and Treatment     Row Name 07/20/22 1532          OT Time and Intention    Document Type daily treatment  -AF     Mode of Treatment occupational therapy  -AF     Patient Effort adequate  -AF     Row Name 07/20/22 1532          General Information    Patient/Family/Caregiver Comments/Observations pt sitting up in bed Ampar[place] and up in w/c after PT in PM  -AF     Existing Precautions/Restrictions fall;other (see comments)  contact precautions  -AF     Row Name 07/20/22 1532          Pain Assessment    Pretreatment Pain Rating 7/10  -AF     Posttreatment Pain Rating 7/10  -AF     Pain Location - Side/Orientation Left  -AF     Pain Location - --  LE and groin area  -AF     Pre/Posttreatment Pain Comment lioderm patch  -AF     Row Name 07/20/22 1532          Cognition/Psychosocial    Affect/Mental Status (Cognition) flat/blunted affect  -AF     Orientation Status (Cognition) oriented x 3  -AF     Follows Commands (Cognition) follows one-step commands  -AF     Personal Safety Interventions fall prevention program maintained;gait belt;nonskid shoes/slippers when out of bed  -AF     Row Name 07/20/22 1532          Bathing    [place] Level (Bathing) bathing skills;verbal cues;minimum assist (75% patient effort)  -AF     Assistive Device (Bathing) grab bar/tub rail;hand held shower spray hose;tub bench  -AF     Position (Bathing) supported sitting;supported standing  -AF     Set-up Assistance (Bathing) obtain supplies  -AF     Row Name 07/20/22 1532          Upper Body Dressing    [place] Level (Upper Body Dressing) upper body dressing skills;set up assistance  -AF     Position (Upper Body Dressing) supported sitting  -AF     Set-up Assistance (Upper Body Dressing) obtain clothing  -AF     Row Name 07/20/22 1532          Lower Body Dressing    [place] Level (Lower Body Dressing) don;pants/bottoms;shoes/slippers;underwear;socks;maximum assist (25% patient effort)  -AF     Position (Lower Body Dressing)  supported standing;supported sitting  -AF     Set-up Assistance (Lower Body Dressing) obtain clothing  -AF     Row Name 07/20/22 1532          Grooming    [place] Level (Grooming) grooming skills;set up  -AF     Position (Grooming) supported sitting  -AF     Comment (Grooming) w/c level  -AF     Row Name 07/20/22 1532          Bed Mobility    Supine-Sit [place] (Bed Mobility) standby assist  -AF     Assistive Device (Bed Mobility) bed rails  -AF     Row Name 07/20/22 1532          Transfer Assessment/Treatment    Comment, (Transfers) sit to  shower with use of grab bars CGA  -AF     Row Name 07/20/22 1532          Transfers    Bed-Chair [place] (Transfers) contact guard  -AF     Assistive Device (Bed-Chair Transfers) wheelchair;walker, front-wheeled  -AF     [place] Level (Shower Transfer) minimum assist (75% patient effort);moderate assist (50% patient effort);verbal cues  -AF     Assistive Device (Shower Transfer) grab bar, tub/shower;tub bench;wheelchair  -AF     Row Name 07/20/22 1532          Shower Transfer    Type (Shower Transfer) stand pivot/stand step  -AF     Comment, (Shower Transfer) with increased time and vc's  -AF     Row Name 07/20/22 1532          Shoulder (Therapeutic Exercise)    Shoulder Strengthening (Therapeutic Exercise) bilateral;scapular stabilization;sitting;2 lb free weight;15 repititions;2 sets  -AF     Row Name 07/20/22 1532          Elbow/Forearm (Therapeutic Exercise)    Elbow/Forearm Strengthening (Therapeutic Exercise) bilateral;flexion;extension;supination;pronation;sitting;2 lb free weight;15 repititions;2 sets  -AF     Row Name 07/20/22 1532          Wrist (Therapeutic Exercise)    Wrist Strengthening (Therapeutic Exercise) bilateral;flexion;extension;2 lb free weight;15 repititions;2 sets  -AF     Row Name 07/20/22 1532          Hand (Therapeutic Exercise)    Hand Strengthening (Therapeutic Exercise) bilateral; strengthening;hand gripper;15  repititions;3 sets  -AF     Row Name 07/20/22 1532          Balance    Static Sitting Balance independent  -AF     Dynamic Sitting Balance supervision  -AF     Static Standing Balance contact guard  with use of grab bar and ADL tasks  -AF     Row Name 07/20/22 1532          Positioning and Restraints    Pre-Treatment Position in bed  -AF     Post Treatment Position wheelchair  -AF     In Wheelchair sitting;call light within reach;exit alarm on;encouraged to call for assist;with PT  with PT in AM and in room in PM  -AF           User Key  (r) = Recorded By, (t) = Taken By, (c) = Cosigned By    Initials Name Effective Dates    AF Tasha Helm, OTR 06/16/21 -                  Occupational Therapy Education                 Title: PT OT SLP Therapies (In Progress)     Topic: Occupational Therapy (Done)     Point: ADL training (Done)     Description:   Instruct learner(s) on proper safety adaptation and remediation techniques during self care or transfers.   Instruct in proper use of assistive devices.              Learning Progress Summary           Patient Acceptance, E,TB,D, VU,NR by  at 6/25/2022 1434      Show all documentation for this point (2)                 Point: Home exercise program (Done)     Description:   Instruct learner(s) on appropriate technique for monitoring, assisting and/or progressing therapeutic exercises/activities.              Learning Progress Summary           Patient Acceptance, E,D,H, VU by  at 6/30/2022 1544    Comment: review of HEP with hand weights and theraband      Show all documentation for this point (2)                 Point: Precautions (Done)     Description:   Instruct learner(s) on prescribed precautions during self-care and functional transfers.              Learning Progress Summary           Patient Acceptance, E,TB,D, VU,NR by  at 6/25/2022 1434      Show all documentation for this point (2)                 Point: Body mechanics (Done)     Description:   Instruct  learner(s) on proper positioning and spine alignment during self-care, functional mobility activities and/or exercises.              Learning Progress Summary           Patient Acceptance, E,TB,D, VU,NR by  at 6/25/2022 1434      Show all documentation for this point (2)                             User Key     Initials Effective Dates Name Provider Type Discipline     06/16/21 -  Fany Lima, PT Physical Therapist PT    AF 06/16/21 -  Tasha Helm, OTR Occupational Therapist OT                    OT Recommendation and Plan                         Time Calculation:      Time Calculation- OT     Row Name 07/20/22 1537 07/20/22 1535          Time Calculation- OT    OT Start Time 1300  -AF 0830  -AF     OT Stop Time 1330  -AF 0930  -AF     OT Time Calculation (min) 30 min  -AF 60 min  -AF           User Key  (r) = Recorded By, (t) = Taken By, (c) = Cosigned By    Initials Name Provider Type    AF Tasha Helm, OTR Occupational Therapist              Therapy Charges for Today     Code Description Service Date Service Provider Modifiers Qty    82792956267 HC OT SELF CARE/MGMT/TRAIN EA 15 MIN 7/20/2022 Tasha Helm OTJANICE GO 4    [id number] HC OT THER PROC EA 15 MIN 7/20/2022 Tasha Helm OTJANICE GO 2                   RAMO Mike  7/20/2022

## 2022-07-20 NOTE — NURSING NOTE
Dialysis complete and removed 4 liters with this treatment and no complications noted.  Dressing change to dialysis catheter and vital signs are in epic.

## 2022-07-20 NOTE — THERAPY TREATMENT NOTE
Inpatient Rehabilitation - Physical Therapy Treatment Note       [facility]     Patient Name: Zaina Martinez  : 1965  MRN: [id number]    Today's Date: 2022                    Admit Date: 2022      Visit Dx:     ICD-10-CM ICD-9-CM   1. Generalized weakness  R53.1 780.79   2. Diabetic muscle infarction (formerly [facility])  E11.69 250.80    M62.20 728.89   3. Other insomnia  G47.09 780.52       Patient Active Problem List   Diagnosis   • Renal insufficiency   • Hypertensive disorder   • Hypothyroidism   • Type 2 diabetes mellitus with kidney complication, with long-term current use of insulin (formerly [facility])   • Rheumatoid arthritis (formerly [facility])   • Angioedema   • Esophageal dysmotility   • Anemia   • Medically noncompliant   • Myocardial infarction due to demand ischemia (formerly [facility])   • Enteritis   • PRES (posterior reversible encephalopathy syndrome)   • Urine retention   • Klebsiella infection   • Superficial thrombophlebitis   • Generalized weakness   • ESRD (end stage renal disease) (formerly [facility])   • CAD (coronary artery disease)   • Abnormal urinalysis   • Chronic diastolic CHF (congestive heart failure) (formerly [facility])   • Pyelonephritis   • Calculus of gallbladder with acute on chronic cholecystitis without obstruction   • Pleural effusion on right   • Anemia due to chronic kidney disease, on chronic dialysis (formerly [facility])   • Abnormal findings on diagnostic imaging of other specified body structures   • Acute upper respiratory infection   • Agitation   • Alkaline phosphatase raised   • Casts present in urine   • Cellulitis of toe   • Hip pain   • Community acquired pneumonia   • Depressive disorder   • Diarrhea of presumed infectious origin   • Difficult or painful urination   • Disease due to severe acute respiratory syndrome coronavirus 2 (SARS-CoV-2)   • Dyspnea   • Encounter for follow-up examination after completed treatment for conditions other than malignant neoplasm   • H/O: hypothyroidism   • Hyperlipidemia   • Hypomagnesemia   • Intractable  vomiting with nausea   • Leukocytosis   • Luetscher's syndrome   • Need for influenza vaccination   • Restless legs   • Noncompliance with treatment   • Shoulder pain   • Urinary tract infectious disease   • Metabolic encephalopathy   • Abnormal findings on diagnostic imaging of abdomen   • Status post cholecystectomy   • Hyponatremia   • Leukocytosis   • Acute metabolic encephalopathy   • Encephalopathy, toxic   • Acute CVA (cerebrovascular accident) (HCC)   • Intracranial hemorrhage (HCC)   • Stroke (HCC)   • Abnormal urinalysis   • Diabetic muscle infarction (HCC)   • Other insomnia       Past Medical History:   Diagnosis Date   • Acute CVA (cerebrovascular accident) (HCC) 5/1/2022   • Acute on chronic diastolic CHF (congestive heart failure) (HCC)    • CAD (coronary artery disease) 12/20/2021   • Diabetes (HCC)    • Disease of thyroid gland    • GERD (gastroesophageal reflux disease)    • Hyperlipidemia 11/30/2018   • Hypertension    • Rheumatoid arthritis (HCC)        Past Surgical History:   Procedure Laterality Date   • CHOLECYSTECTOMY WITH INTRAOPERATIVE CHOLANGIOGRAM N/A 1/10/2022    Procedure: Laparoscopic cholecystectomy with intraoperative cholangiogram;  Surgeon: Ramana Raygoza MD;  Location: [facility];  Service: General;  Laterality: N/A;   • EYE SURGERY     • HYSTERECTOMY     • INSERTION HEMODIALYSIS CATHETER N/A 12/6/2021    Procedure: HEMODIALYSIS CATHETER INSERTION;  Surgeon: Keli Salazar MD;  Location: [facility] 18/19;  Service: Vascular;  Laterality: N/A;   • INSERTION HEMODIALYSIS CATHETER N/A 5/3/2022    Procedure: TUNNELED CATHETER PLACEMENT;  Surgeon: Keli Salazar MD;  Location: [facility];  Service: Vascular;  Laterality: N/A;   • MUSCLE BIOPSY Left 6/15/2022    Procedure: Left quadriceps muscle biopsy;  Surgeon: Marques Ma MD;  Location: [facility];  Service: Neurosurgery;  Laterality: Left;       PT ASSESSMENT (last 12 hours)     [facility] PT  Evaluation and Treatment     Row Name 07/20/22 0947          PT Time and Intention    Document Type daily treatment  -LB     Mode of Treatment physical therapy  -LB     Patient/Family/Caregiver Comments/Observations Pnt seated in w/c in BR. OT present.  -LB     Row Name 07/20/22 0947          General Information    Existing Precautions/Restrictions fall;other (see comments)  contact precautions  -LB     Row Name 07/20/22 0947          Pain Assessment    Pretreatment Pain Rating 7/10  -LB     Pain Location - Side/Orientation Left  -LB     Pain Location - other (see comments)  thigh  -LB     Pre/Posttreatment Pain Comment predicated, amb, distraction  -LB     Row Name 07/20/22 0947          Bed Mobility    Supine-Sit [place] (Bed Mobility) contact guard  -LB     Sit-Supine [place] (Bed Mobility) minimum assist (75% patient effort)  for LLE  -LB     Comment, (Bed Mobility) assessed on txment mat  -LB     Row Name 07/20/22 0947          Transfers    Bed-Chair [place] (Transfers) contact guard  -LB     Chair-Bed [place] (Transfers) contact guard  -LB     Assistive Device (Bed-Chair Transfers) wheelchair  -LB     Sit-Stand [place] (Transfers) minimum assist (75% patient effort);contact guard  Ranulfo for several trials. CGA for several trials  -LB     Stand-Sit [place] (Transfers) minimum assist (75% patient effort);verbal cues;contact guard  Ranulfo for 1 trial. CGA for subsequent trials  -LB     Row Name 07/20/22 0947          Chair-Bed Transfer    Assistive Device (Chair-Bed Transfers) wheelchair  -LB     Row Name 07/20/22 0947          Sit-Stand Transfer    Assistive Device (Sit-Stand Transfers) walker, front-wheeled;wheelchair  -LB     Row Name 07/20/22 0947          Stand-Sit Transfer    Assistive Device (Stand-Sit Transfers) walker, front-wheeled;wheelchair  -LB     Row Name 07/20/22 0947          Gait/Stairs (Locomotion)    [place] Level (Gait) minimum assist (75% patient  effort);verbal cues  -LB     Assistive Device (Gait) walker, front-wheeled  -LB     Distance in Feet (Gait) 75', 80'  -LB     Pattern (Gait) step-to  -LB     Deviations/Abnormal Patterns (Gait) antalgic;gait speed decreased;kenn decreased;stride length decreased;weight shifting decreased  -LB     Bilateral Gait Deviations heel strike decreased;forward flexed posture  -LB     [place] Level (Stairs) --  declined stairs  -LB     Comment, (Gait/Stairs) vc's to turn around and square up to w/c prior to sitting. Attempted x 1 to sit before she turned around w rwx. Despite multiple cues, pnt sat hard in w/c before she finished turning around..  -LB     Row Name 07/20/22 0947          Wheelchair Mobility/Management    Method of Wheelchair Locomotion (Mobility) bimanual (upper extremity) propulsion  -LB     Forward Propulsion [place] (Wheelchair) standby assist  -LB     Steering [place] (Wheelchair) standby assist;verbal cues  -LB     Turning [place] (Wheelchair) verbal cues;standby assist  -LB     Distance Propelled in Feet (Wheelchair) 100  -LB     Row Name 07/20/22 0947          Hip (Therapeutic Exercise)    Hip AROM (Therapeutic Exercise) bilateral;aBduction;aDduction;supine;10 repetitions  -LB     Row Name 07/20/22 0947          Knee (Therapeutic Exercise)    Knee AROM (Therapeutic Exercise) flexion;sitting;bilateral;supine;heel slides  seated: L knne flex.  -LB     Row Name 07/20/22 0947          Ankle (Therapeutic Exercise)    Ankle AROM (Therapeutic Exercise) bilateral;dorsiflexion;plantarflexion;15 repititions  -LB     Row Name 07/20/22 0947          Positioning and Restraints    Post Treatment Position wheelchair  -LB     In Wheelchair sitting;call light within reach;exit alarm on  -LB           User Key  (r) = Recorded By, (t) = Taken By, (c) = Cosigned By    Initials Name Provider Type    LB Liz Rodriguez PTA Physical Therapist Assistant              Wound 06/15/22 1312 Left anterior  thigh Incision (Active)   Dressing Appearance open to air 07/19/22 2030   Closure Liquid skin adhesive;Approximated 07/19/22 2030   Base dry;clean 07/20/22 0842   Drainage Amount none 07/20/22 0842     Physical Therapy Education                 Title: PT OT SLP Therapies (In Progress)     Topic: Physical Therapy (In Progress)     Point: Mobility training (Done)     Learning Progress Summary           Patient Acceptance, E, VU,NR by LB at 7/20/2022 1507   Family Acceptance, E,D, VU,NR by LB at 7/13/2022 1456    Comment: Stairs, (dgt practiced). 2 Helpers for stairs used.  Attempted car tsf to dgts car. Unable to perform as the SUV was too high for pnt. A platform step was offered for practice, but pnt declined.  Pnt refused amb during fam teaching, 2ary to L thigh pain.   Significant Other Acceptance, E, VU by LB at 7/15/2022 1556    Comment: car tsf      Show all documentation for this point (42)                 Point: Home exercise program (In Progress)     Learning Progress Summary           Patient Acceptance, E,TB, NR by MS at 7/11/2022 1434      Show all documentation for this point (13)                 Point: Body mechanics (Done)     Learning Progress Summary           Patient Acceptance, E,D, VU,DU by DP at 7/18/2022 1550   Family Acceptance, E, VU by LB at 7/13/2022 1504      Show all documentation for this point (9)                 Point: Precautions (Done)     Learning Progress Summary           Patient Acceptance, E,D, VU,DU by DP at 7/18/2022 1550      Show all documentation for this point (9)                             User Key     Initials Effective Dates Name Provider Type Discipline    LB 03/07/18 -  Liz Rodriguez PTA Physical Therapist Assistant PT    MS 06/16/21 -  Teagan Santos PT Physical Therapist PT    DP 08/24/21 -  Jay Jim, PT Physical Therapist PT                PT Recommendation and Plan         Patient was wearing a face mask during this therapy encounter. Therapist  used appropriate personal protective equipment including mask and gloves.  Mask used was standard procedure mask. Appropriate PPE was worn during the entire therapy session. Hand hygiene was completed before and after therapy session. Patient is not in enhanced droplet precautions.                     Time Calculation:      PT Charges     Row Name 07/20/22 1506 07/20/22 0947          Time Calculation    Start Time 1230  -LB 0930  -LB     Stop Time 1300  -LB 1030  -LB     Time Calculation (min) 30 min  -LB 60 min  -LB           User Key  (r) = Recorded By, (t) = Taken By, (c) = Cosigned By    Initials Name Provider Type    Liz Henriquez PTA Physical Therapist Assistant                Therapy Charges for Today     Code Description Service Date Service Provider Modifiers Qty    71279155882 HC PT THER PROC EA 15 MIN 7/19/2022 Liz Rodriguez PTA GP 2    37452499133 HC PT THER PROC EA 15 MIN 7/20/2022 Liz Rodriguez PTA GP 6            PT G-Codes  AM-PAC 6 Clicks Score (PT): 17      Liz Rodriguez PTA  7/20/2022

## 2022-07-20 NOTE — PROGRESS NOTES
Inpatient Rehabilitation Plan of Care Note    Plan of Care  Care Plan Reviewed - No updates at this time.    Safety    Performed Intervention(s)  Fall precautions: bed low and locked, chair alarm and bed alarms in use, nonskid  socks and gait belt in use, call light and personal belongings within reach.  Hourly rounding.      Psychosocial    Performed Intervention(s)  Provide distraction and/or relaxation as needed.  Allow extra time for patient to verbalize needs, concerns, feelings, etc.      Body Systems    Performed Intervention(s)  Dialysis mwf  Monitor weight and I/O.  DM educator consult.      Sphincter Control    Performed Intervention(s)  Monitor I/O.  Bowel meds prn.      Pain    Performed Intervention(s)  Pain meds prn .  Ice to LLE per order      Body Function Structure    Performed Intervention(s)  Ointment per MD order    Signed by: Jessica Sandhu RN

## 2022-07-21 LAB
GLUCOSE BLDC GLUCOMTR-MCNC: 105 MG/DL (ref 70–130)
GLUCOSE BLDC GLUCOMTR-MCNC: 184 MG/DL (ref 70–130)
GLUCOSE BLDC GLUCOMTR-MCNC: 201 MG/DL (ref 70–130)
GLUCOSE BLDC GLUCOMTR-MCNC: 239 MG/DL (ref 70–130)
SARS-COV-2 ORF1AB RESP QL NAA+PROBE: NOT DETECTED

## 2022-07-21 PROCEDURE — 82962 GLUCOSE BLOOD TEST: CPT

## 2022-07-21 PROCEDURE — 25010000002 HEPARIN (PORCINE) PER 1000 UNITS: Performed by: PHYSICAL MEDICINE & REHABILITATION

## 2022-07-21 PROCEDURE — U0004 COV-19 TEST NON-CDC HGH THRU: HCPCS | Performed by: PHYSICAL MEDICINE & REHABILITATION

## 2022-07-21 PROCEDURE — 97110 THERAPEUTIC EXERCISES: CPT

## 2022-07-21 PROCEDURE — 63710000001 PREDNISONE PER 5 MG: Performed by: PHYSICAL MEDICINE & REHABILITATION

## 2022-07-21 PROCEDURE — 63710000001 INSULIN LISPRO (HUMAN) PER 5 UNITS: Performed by: NURSE PRACTITIONER

## 2022-07-21 PROCEDURE — 97535 SELF CARE MNGMENT TRAINING: CPT

## 2022-07-21 RX ORDER — PREDNISONE 10 MG/1
5 TABLET ORAL
Status: DISCONTINUED | OUTPATIENT
Start: 2022-08-05 | End: 2022-07-22 | Stop reason: HOSPADM

## 2022-07-21 RX ORDER — PREDNISONE 10 MG/1
10 TABLET ORAL
Status: DISCONTINUED | OUTPATIENT
Start: 2022-07-22 | End: 2022-07-22 | Stop reason: HOSPADM

## 2022-07-21 RX ADMIN — SERTRALINE 150 MG: 100 TABLET, FILM COATED ORAL at 08:54

## 2022-07-21 RX ADMIN — SODIUM ZIRCONIUM CYCLOSILICATE 5 G: 5 POWDER, FOR SUSPENSION ORAL at 08:55

## 2022-07-21 RX ADMIN — GABAPENTIN 300 MG: 300 CAPSULE ORAL at 22:12

## 2022-07-21 RX ADMIN — INSULIN LISPRO 3 UNITS: 100 INJECTION, SOLUTION INTRAVENOUS; SUBCUTANEOUS at 11:57

## 2022-07-21 RX ADMIN — CLONIDINE HYDROCHLORIDE 0.3 MG: 0.1 TABLET ORAL at 05:33

## 2022-07-21 RX ADMIN — ROPINIROLE HYDROCHLORIDE 0.75 MG: 0.5 TABLET, FILM COATED ORAL at 22:12

## 2022-07-21 RX ADMIN — LEVOTHYROXINE SODIUM 200 MCG: 0.1 TABLET ORAL at 05:33

## 2022-07-21 RX ADMIN — LOSARTAN POTASSIUM 100 MG: 100 TABLET, FILM COATED ORAL at 08:54

## 2022-07-21 RX ADMIN — MICONAZOLE NITRATE 1 APPLICATION: 2 CREAM TOPICAL at 09:15

## 2022-07-21 RX ADMIN — OXYCODONE 5 MG: 5 TABLET ORAL at 09:19

## 2022-07-21 RX ADMIN — ZOLPIDEM TARTRATE 2.5 MG: 5 TABLET ORAL at 00:23

## 2022-07-21 RX ADMIN — CARVEDILOL 25 MG: 25 TABLET, FILM COATED ORAL at 08:54

## 2022-07-21 RX ADMIN — GABAPENTIN 100 MG: 100 CAPSULE ORAL at 18:58

## 2022-07-21 RX ADMIN — PREDNISONE 5 MG: 10 TABLET ORAL at 08:54

## 2022-07-21 RX ADMIN — INSULIN GLARGINE-YFGN 10 UNITS: 100 INJECTION, SOLUTION SUBCUTANEOUS at 08:52

## 2022-07-21 RX ADMIN — SPIRONOLACTONE 100 MG: 100 TABLET ORAL at 08:55

## 2022-07-21 RX ADMIN — TAMSULOSIN HYDROCHLORIDE 0.4 MG: 0.4 CAPSULE ORAL at 08:54

## 2022-07-21 RX ADMIN — OXYCODONE 5 MG: 5 TABLET ORAL at 13:32

## 2022-07-21 RX ADMIN — PANTOPRAZOLE SODIUM 40 MG: 40 TABLET, DELAYED RELEASE ORAL at 05:33

## 2022-07-21 RX ADMIN — ZOLPIDEM TARTRATE 2.5 MG: 5 TABLET ORAL at 22:12

## 2022-07-21 RX ADMIN — Medication 1 TABLET: at 08:54

## 2022-07-21 RX ADMIN — HYDRALAZINE HYDROCHLORIDE 100 MG: 50 TABLET, FILM COATED ORAL at 05:33

## 2022-07-21 RX ADMIN — CARVEDILOL 25 MG: 25 TABLET, FILM COATED ORAL at 18:58

## 2022-07-21 RX ADMIN — OXYCODONE 5 MG: 5 TABLET ORAL at 05:33

## 2022-07-21 RX ADMIN — INSULIN LISPRO 5 UNITS: 100 INJECTION, SOLUTION INTRAVENOUS; SUBCUTANEOUS at 08:55

## 2022-07-21 RX ADMIN — ASPIRIN 81 MG: 81 TABLET, COATED ORAL at 08:55

## 2022-07-21 RX ADMIN — INSULIN GLARGINE-YFGN 10 UNITS: 100 INJECTION, SOLUTION SUBCUTANEOUS at 22:13

## 2022-07-21 RX ADMIN — CLONIDINE HYDROCHLORIDE 0.3 MG: 0.1 TABLET ORAL at 18:57

## 2022-07-21 RX ADMIN — HEPARIN SODIUM 3800 UNITS: 1000 INJECTION INTRAVENOUS; SUBCUTANEOUS at 14:29

## 2022-07-21 RX ADMIN — GABAPENTIN 300 MG: 300 CAPSULE ORAL at 08:54

## 2022-07-21 RX ADMIN — HYDRALAZINE HYDROCHLORIDE 100 MG: 50 TABLET, FILM COATED ORAL at 22:12

## 2022-07-21 RX ADMIN — OXYCODONE 5 MG: 5 TABLET ORAL at 00:23

## 2022-07-21 RX ADMIN — OXYCODONE 5 MG: 5 TABLET ORAL at 18:58

## 2022-07-21 NOTE — NURSING NOTE
Tolerated hd today. 3.8l uf. Mild ble cramps last 5min of tx. tx terminated. Cramps relieved post blood return. vss 147/70 hr 48.  Bradycardia in low 40s during tx but maintained adequate bp . Concerns with hr in 40s. Historical review demonstrates hr 50s to be the usual

## 2022-07-21 NOTE — PROGRESS NOTES
CC: Debility    SUBJECTIVE:  Patient working with physical therapy.  Ambulated 1 time around the gym.  Slow kenn.  Still with a burning, aching pain in the left thigh.  Denies pain elsewhere.  Patient reviewed with her rheumatologist who stated could increase prednisone to 10 mg with breakfast for 1 week, then 7.5 mg daily with breakfast for 1 week, then back to 5 mg dose.  Patient have dialysis today and then tomorrow morning per nephrology and then look at possibly discharge to skilled nursing facility tomorrow afternoon after dialysis.  Reviewed with patient no update on the additional studies on her muscle biopsy.  Reviewed I will plan to review with the pathology department at [facility] when down there next Tuesday.        OBJECTIVE:  Vitals:    07/21/22 0532   BP: 162/73   Pulse: 59   Resp: 18   Temp: 98.1 °F (36.7 °C)   SpO2: 95%       GENERAL: NAD  MENTAL STATUS: Awake alert  HEENT:  NCAT  CARDIOVASCULAR: Sinus rhythm    CHEST:  hemodialysis access right chest  RESPIRATORY: Normal respirations.      ABDOMEN: Nondistended.  Normoactive bowel sounds    EXTREMITIES: Left thigh edema .  Edema at the left thigh appears similar..  Also has some edema in the left lower leg as well as right lower leg      No myoclonus.  NEUROLOGIC:    Takes resistance in the bilateral upper extremities, right lower extremity and distal left lower extremity.  Pain inhibition proximally in the left lower extremity but at least antigravity left knee extension            Scheduled Meds:aspirin, 81 mg, Oral, Daily  b complex-vitamin c-folic acid, 1 tablet, Oral, Daily  carvedilol, 25 mg, Oral, BID With Meals  cloNIDine, 0.3 mg, Oral, Q8H  gabapentin, 100 mg, Oral, Q24H  gabapentin, 300 mg, Oral, BID  hydrALAZINE, 100 mg, Oral, Q8H  insulin glargine, 10 Units, Subcutaneous, Q12H  insulin lispro, 0-14 Units, Subcutaneous, TID AC  levothyroxine, 200 mcg, Oral, Q AM  lidocaine, 1 patch, Transdermal, Q24H  losartan, 100 mg, Oral,  Q24H  miconazole, 1 application, Topical, Q12H  pantoprazole, 40 mg, Oral, Q AM  predniSONE, 5 mg, Oral, Daily With Breakfast  rOPINIRole, 0.75 mg, Oral, Nightly  sertraline, 150 mg, Oral, Daily  sodium zirconium cyclosilicate, 5 g, Oral, Daily  spironolactone, 100 mg, Oral, Daily  tamsulosin, 0.4 mg, Oral, Daily      Continuous Infusions:   PRN Meds:.•  acetaminophen  •  dextrose  •  dextrose  •  diphenoxylate-atropine  •  glucagon (human recombinant)  •  heparin (porcine)  •  hydrALAZINE  •  nitroglycerin  •  oxyCODONE  •  oxyCODONE  •  polyethylene glycol  •  sodium chloride  •  zolpidem    Glucose   Date/Time Value Ref Range Status   07/21/2022 0634 239 (H) 70 - 130 mg/dL Final     Comment:     Meter: [id number] : [id number] Chico Rogers    07/20/2022 2020 88 70 - 130 mg/dL Final     Comment:     Meter: [id number] : [id number] Louieal Sue NA   07/20/2022 1122 165 (H) 70 - 130 mg/dL Final     Comment:     Meter: [id number] : [id number] Randolph Melissa    07/20/2022 0617 325 (H) 70 - 130 mg/dL Final     Comment:     Meter: [id number] : [id number] León Young    07/20/2022 0227 295 (H) 70 - 130 mg/dL Final     Comment:     Meter: [id number] : [id number] [facility]   07/19/2022 2031 279 (H) 70 - 130 mg/dL Final     Comment:     Meter: [id number] : [id number] León Young    07/19/2022 1613 173 (H) 70 - 130 mg/dL Final     Comment:     Meter: [id number] : [id number] Anup Martin    07/19/2022 0614 267 (H) 70 - 130 mg/dL Final     Comment:     Meter: [id number] : [id number] León SAAVEDRA     Results from last 7 days   Lab Units 07/20/22 2015 07/18/22  1533 07/15/22  2207   WBC 10*3/mm3 10.15 11.46* 11.29*   HEMOGLOBIN g/dL 9.2* 8.4* 8.5*   HEMATOCRIT % 30.5* 27.2* 27.4*   PLATELETS 10*3/mm3 272 239 187     Results from last 7 days   Lab Units 07/20/22 2015 07/18/22  1533 07/15/22  2207   SODIUM mmol/L 134* 126* 134*   POTASSIUM mmol/L 4.0 5.3* 4.2   CHLORIDE mmol/L 98  94* 98   CO2 mmol/L 28.0 20.0* 25.0   BUN mg/dL 10 42* 15   CREATININE mg/dL 1.47* 3.84* 2.03*   CALCIUM mg/dL 8.6 7.7* 8.2*   BILIRUBIN mg/dL  --   --  0.5   ALK PHOS U/L  --   --  451*   ALT (SGPT) U/L  --   --  16   AST (SGOT) U/L  --   --  36*   GLUCOSE mg/dL 82 161* 131*      Latest Reference Range & Units 05/27/22 11:30   Creatine Kinase 20 - 180 U/L 94   Aldolase 3.3 - 10.3 U/L 5.6       Imaging Results (Last 24 Hours)     ** No results found for the last 24 hours. **          ASSESSMENT AND PLAN    # R MCA s/p TPA with subsequent ICH with debility  Initially held antiplatelet/anticoagulation.  Reviewed with neurology on May 20 and resume aspirin 81 mg daily  SBP goal <160  Recommend outpatient Neurology fu - repeat MRI in 3 months        # DM  June 27-hyperglycemia on steroids.  Lantus increased to 30 units twice daily, Humalog 5 units 3 times daily with meals, and all increase sliding scale coverage  June 28 -hypoglycemic after increasing insulin dosing.  Blood glucose up to 98 prior to lunch, will hold sliding scale insulin and give 5 units scheduled with meal  June 30-hypoglycemia-Lantus twice daily decreased from 35 to 20 units.  Scheduled Humalog with meals discontinued  July 1-prednisone has been discontinued.  Reviewed with internal medicine and Lantus decreased to 10 units twice daily and on discharge home to resume her home regimen of Lantus 10 units daily.  She will check her sugar tonight at home and if elevated give Lantus 10 units tonight and then continue with the Lantus 10 units daily tomorrow.  Reviewed with patient and her  that her sugars may be elevated initially as she just completed prednisone.  They were instructed to call for any additional instructions on adjustment in regimen if it remains elevated at home this weekend  July 18 - blood glucose increased on Prednisone 5 mg daily  July 19-Lantus adjusted  July 20 - Lantus increased 10 units bid     # ESRD   Nephrology  following  Inpatient HD    Flomax  Hyperkalemia  July 5-patient had hemodialysis on July 3 and July 4 for hyperkalemia.  Lokelma resumed by nephrology  July 7-had hemodialysis yesterday and has plans for hemodialysis again today  July 8-hemodialysis again today  July 21- HD again today then friday.     Infectious disease-   # s/p catheter infection with MRSA  Vancomycin IV with stop date of May 26  May 24-vancomycin random equal 12.90-on vancomycin 500 mg IV on Hulpfwg-Okfqfibv-Sddixmck  May 26-to complete course of vancomycin today  #Urinary tract infection-on Dana 3 urinalysis was negative for infection but noted to have leukocytosis increase and repeat urinalysis on June 6 shows findings consistent with urinary tract infection.  Placed on a course of cefdinir renal dose 3 mg daily for 7 days.  No costophrenic angle tenderness patient reviewed with infectious disease service.  Leukocytosis felt related to the bladder infection and not previous catheter infection.  June 8-urine culture results pending.  On cefdinir.  Culture with E. coli, sensitivity pending  June 9 - E coli sensitive to cephalosporins.  June 21 - continues with leukocytosis WBC  15K today. May be from inflammatory process. Steroids added yesterday.   June 22-urine described as milky characteristic, different from previous-recheck urinalysis with culture if indicated  June 23-Labs are still pending today.  Urinalysis also pending as she does not make much urine.  She had a temperature of 100.2 last evening, afebrile this morning.  June 27 - abnormal UA but urine culture from June 24 no growth  July 1-leukocytosis felt related to the noninfectious process in the left thigh as well as secondary to steroids  July 5-WBC trending down to 15.6 today  July 6-WBC trending down to 12 K  July 7-WBC 8.4     #left hydroureteronephrosis-Dana 3-CT scan shows left hydro ureter nephrosis.  She had some previous dilation of the ureter on comparison the past studies  but increased today.  Bladder noted to be markedly distended.  Will do in and out cath now and daily to decompress bladder.  Addendum-intermittent cath for 600 cc.  June 4-once daily intermittent cath 450 cc.  June 5-seen by urology-Nguyễn catheter placed with plans to recheck hydroureter on renal ultrasound in 3 to 5 days.  June 6-Nguyễn catheter placed yesterday by urology, with only 170 cc out so far.  Patient reports did not note any change in her left groin pain after the in and out cath with decompression of the bladder..  June 8-reviewed with urology service-request noncontrast CT scan stone protocol to evaluate for resolution of the left hydronephrosis with urology planning to see tomorrow to review the films and then make recommendations, urology would recommend leaving the Nguyễn catheter in for the time being.  June 9 - improved left hydroureter on CT , Nguyễn discontinued.   July 1-to follow-up with urology as an outpatient with follow-up CT planned in August.  She occasionally required intermittent straight cath but this was very infrequent.  Home health nursing to follow.  Continues on Flomax  July 7-intermittent straight cath 400 cc  July 11-She continues with hydroureteronephrosis on the left side on follow-up CT scan-intermittent straight cath volumes have not been over about 400 cc over the past month.  Urology re-consulted for updated assessment  July 12-plan is intermittent straight cath routinely once or twice a day  July 14 - ISC for 150 cc         #Anemia-  EPO.  June 6-hemoglobin 7.8  July 12-hemoglobin 8.0     Lymphadenopathy-evaluated by hematology oncology while here.  No significant change from scans earlier this year.  She is to repeat a scan in 3 months and follow-up with hematology oncology  July 1171-axwmej-mr CT of the abdomen pelvis shows lymphadenopathy overall about the same per her report.  To have follow-up with hematology oncology and repeat scan in another 2 months or  so     #Elevated alkaline phosphatase  June 6-elevated alkaline phosphatase 770, up from 289 one week ago, 140 one month ago. Similar elevation in March, Feb even higher at 1,330 ( intra-abdominal fluid collection, underwent CT-guided aspiration  Revealed blood and cultures did not reveal any growth and therefore antibiotics  discontinued.  Surgery also did evaluate and signed off.), and elevated during admission in January ( She was seen by general surgery who recommended and performed laparoscopic cholecystectomy during that admission).   ALT 70, AST 87, Total bilirubin 0.8. Ca 8.3.  ? If liver source or bone or due to an inflammatory response.  June 7-GGT also elevated.  Seen by gastroenterology.  [place] biliary source.  Liver ultrasound ordered  June 8-liver ultrasound was unremarkable  June 21 - patient declined liver biopsy.   June 27-gastroenterology following peripherally-plans to follow-up as an outpatient and review option of liver biopsy again if alkaline phosphatase remains elevated  June 28-remains elevated at 503  June 30-alkaline phosphatase lower at 426  July 6-trend back up to 634  July 8-alkaline phosphatase unchanged 627  July 12-alkaline phosphatase 551  July 15 - alkaline phosphatase 451     #Pain - neuropathy BL lower extremity/left thigh pain  Gabapentin/oxycodone.    June 7-patient with lethargy this morning.  May be due to urinary tract infection but with recent increase and gabapentin and oxycodone, will change gabapentin to 200 mg twice a day and decrease oxycodone from 5 back down to 2.5 mg p.o. every 4 hours as needed  June 8-better speed with her processing today  June 21 - pain med regimen recently adjusted with tramadol 25 mg q 4 hours prn, gabapentin 300 g bid, lidoderm patch, oxycodone 2.5 mg q 6 hours prn. Prednisone 60 mg daily added which may help with pain from inflammatory process. Reports pain better today and participated better in therapies.   June 27 - continue present  regimen  July 1-home on oxycodone 5 mg p.o. every 4 hours.  Pain dispense #40, gabapentin 300 mg twice daily, Tylenol 500 mg every 6 hours as needed  July 5-increase gabapentin slightly 300-100-300 mg.  Watch for any myoclonic twitching  July 8-had some lethargy earlier today, patient feels that she is tolerating oxycodone and gabapentin.  Was alert when seen on rounds.        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  Seen by infectious disease service with no acute infectious process noted.  Evaluated by hematology regarding lymphadenopathy, lymph node felt too small to biopsy.  Patient was on a course of low-dose prednisone 10 mg for 3 days then 5 mg for 2 days and then another 10 mg dose with out any significant improvement in the swelling or pain.  She has a history of rheumatoid arthritis.  See CT of the left thigh and MRI of the left thigh and pelvis reports   With lumbar radiculoplexitis and diabetic amyotrophy, on review of the literature do not see edema that she presents with associated with the findings or MRI changes in the tendon and musculature with edema.  There is descriptions of MRI changes in the plexus and peripheral nerve.  In terms of treatment options for diabetic amyotrophy , there have not been definitive clinical trials.  Immunomodulation with steroids has been inconclusive as well as other immunomodulation therapy.  Reviewed with the patient  that  feel that she would benefit from seeing her rheumatologist as an outpatient to evaluate this further, as there does appear to be  inflammatory cause given the lymphadenopathy and edema.  Rheumatologist does not come to the hospital.  CPK x2 has been normal.  Aldolase has been normal.  May need to look at biopsy of left thigh muscle to assess further.   June 8-patient reviewed with neurology yesterday who will assess and also provide input regarding whether muscle biopsy may be helpful.  June 9 - Discussed with Neurology and Rheumatology - plan is for EMG and then muscle biopsy.   Dana 15 - Left quadricep muscle biopsy was completed 6/15, results pending.  Labs: CKs have been normal, ANCA panel 6/11 was unremarkable  June 21 - started on full treatment dose of Prednisone 60 mg daily yesterday pending path results. Reports pain better so far today. Specimen sent to [facility]. Clinical impression presently focal inflammatory myositis pending final pathology results.   June 22-pain continues better today.  June 23-outside pathology report still pending  June 24 - Patient reports left thigh pain better over past couple days but still present. Does not have the soreness at medial thigh as before. Complains of pain at mid-thigh. No change in swelling. Does have discomfort with proximal movement in LLE. Walked 320 feet CTG SBA RW today.   Pain improved on steroid. Discontinued atorvastatin. Biopsy results returned from [facility] - see report -Type 2 fiber atrophy, advanced. Denervation/reinnervation. No evidence of inflammatory myopathy or vasculitis. [place] [facility] lab pursuing a dystrophy panel and will report findings in an addendum.  Reviewed with Neurology - as shown symptomatic response, continue Prednisone 60 mg daily for about one more week, then taper off over month.   June 27 - continue present regimen  June 28-increased pain medication regimen to oxycodone 5 mg every 4 hours as needed for severe pain.   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  Treatment would be aspirin which she is already on. Discussed with Neurology and as been on Prednisone 60 mg for only 1.5 week, will stop and not do taper. Discussed with Internal Medicine who will adjust insulin.  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  Reviewed with the patient that treatment for this is aspirin which she is already on.  She may do walking activities but would avoid strenuous activities with left quadricep strengthening.  She may strengthen the other 3 extremities as tolerated.  Reviewed may take 3 months to resolve.  July 7-continues with left thigh pain.  She is noted to have increased edema in both lower extremities but particularly the left thigh compared to recent.  Lokelma has been associated with fluid retention which may explain increase edema in part.  Continues on aspirin for suspected diabetic muscular infarct.  No update report yet on additional studies for muscle biopsy.  Initial report was June 24 with additional studies planned  July 11-updated MRI imaging of the left thigh this past weekend.  See report.  She has appointment with rheumatology for further assessment later this week. -will recheck CK level for updated baseline as about 4 weeks out now from her biopsy which could have affected level.  Previous CK levels were not elevated.  White blood cell count has normalized which would hold against any active infection.  Edema at the left thigh appears about the same.  Continues with pain on the left side with pain inhibition with proximal muscles.  July 12-CPK was 39 yesterday.  Patient reviewed with pathologist at [facility] today-no additional results yet-they are to call when have additional results  July 15-appointment with rheumatology this afternoon.  Patient reviewed with rheumatologist earlier today.  No additional records update yet on additional studies  muscle biopsy  July 16 - Reviewed with Rheum today. Thought remains diabetic muscle infarct, continue ASA.  Start Prednisone 5mg to see if rheumatoid arthritis contributing to some pain and then f/up with Rheum outpatient for biologic agent.  July 19-does not notice any significant change in pain in his left thigh  July 21-patient describes her pain overall about the same.  No change in her energy level.  Patient reviewed with her rheumatologist who stated could increase prednisone to 10 mg with breakfast for 1 week, then 7.5 mg daily with breakfast for 1 week, then back to 5 mg dose.      # HTN   Coreg  Clonidine  Hydralazine  Losartan  Spironolactone  Nephrology/internal medicine following     #Hypothyroidism  Levothyroxine  June 9 - recheck TSH- addendum Dana 10- On Nov 30, 2021 , on Dec 2, 2021 T4 = 0.90, on Dec 9, T4=1.68. On May 7, TSH = 134. Has been on Levothyroxine 125 mcg/day it appears since at least Dec 13, 2021. See Dr Templeton's discharge note from Dec 17,2021 which discusses thyroid labs/dosing at that time. On June 9, 2022 TSH = 54.4.   Check free T4. Will get evaluation from Internal Medicine regarding thyroid studies/levothyroxine dosing.  June 11 - levothyroxine increased to 150mcg daily-continue this dose and she will need follow up TSH in 4-6 weeks from June 11 as an outpatient  July 11-repeat TSH and T4 ordered for Monday, July 11 July 12-TSH still elevated, higher at 96.  Levothyroxine increased to 200 mcg daily and separate from other meds.  Free T4 equal 0.68     #CAD  ASA held in setting of ICH - restart aspirin 81 mg daily on May 20, 2022 per Neurology     #Depression   Sertraline  June 28-sertraline dose increased  July 5-reviewed with psychiatry service-continue present regimen    Insomnia-improved on Ambien 5 mg  July 6-fatigue during the day-decrease Ambien 2.5 mg nightly and assess response  July 7-tolerated Ambien 2.5    #GERD   PPI     #Restless leg syndrome  Requip     #DVT  prophylaxis-SCDs ordered but patient refusing.  Per neurology note May 11-continue off anticoagulation/antiplatelet for now. Restarted ASA 81 mg on May 20.  May 16-reviewed with patient and try venous foot compression devices  May 20-also not able to tolerate venous foot compression devices.  May 25-bilateral lower extremity venous duplex negative for DVT  July 9-  venous duplex negative for DVT left lower extremity    Diarrhea-July 7-loose stool x5 over the last day.  Lokelma is not associated with diarrhea.  C. difficile was negative on July 5.  Was on cefdinir from June 7 to June 13 and cefazolin one-time dose on Dana 15.  - will recheck C. Difficile  July 11-C. difficile was negative x2.  Diarrhea resolved        -  comprehensive inpatient rehabilitation program working with PT, OT, SLP for a minimum of three hours per day, five days per week. - will monitor for progress     TEAM CONF - MAY 17- BED SBA. TRANSFER MIN. 4 STAIRS MIN. GAIT 160 FEET CTG RW. SLOW TO PROCESS. BATH MIN. LBD MIN. UBD MIN. GROOMING SET UP. TOILETING MIN . COGNITION OVERALL MILD DEFICITS. VISUAL IMPAIRMENT IMPACTS SOME TESTING. ESRD-HD. ANTIBIOTICS - VANCOMYCIN 10.90 YESTERDAY.  ELOS - 1-2 WEEKS.      TEAM CONF - MAY 24 - ALWAYS SO PROCESSING AFTER EACH DIALYSIS SESSION. AT HOME WOULD GO BACK TO HOME AND SLEEP. BED MIN ASSIST. TRANSFERS MIN. GAIT 80   FEET RW CTG. 4 STAIRS CTG. TOILET TRASNFERS AND SHOWER TRANSFERS MIN CTG. BATH CTG. LBD CTG. UBD SET UP. GROOMING SET UP. TOILETING CTG.   LEFT GROIN - ADDUCTOR PAIN - There is mild joint space narrowing involving both hips symmetrically. There are also some minimal degenerative changes involving both hips. The overall appearance is similar to the study of 12/16/2021. MODERATE WORD RETRIEVAL DEFICITS. IMPAIRED VISION AFFECTS HOME MANAGEMENT TASKS. HYPOGLLYCEMIA AFTER SSI .DECREASED TO 0-7 UNITS.   ELOS - ONE WEEK.         TEAM CONF - MAY 31 - TRANSFERS CTG. GAIT 40- FEET CTG RW  LIMITED BY THIGH PAIN. TOILET TRANSFERS CTG. BATH CTG. LBD CTG. UBD SET UP. TOILETING CTG.  GROOMING SET UP. COGNITION - MODERATE WORD RETRIEVAL DEFICITS, MODERATE IMPAIRED MEMORY IMPACTED BY FATIGUE, STILL SLOW PROCESSING.  BNE (Active)  Att'n. - Mildly Imp.  Exec. Fx. - Mildly Imp., slowed processing speed  Rsng/Jgmnt - WNL  Arith - Mod Imp.  Visuospatial Skills - DNA, pt declined citing poor vision  Visual Mem. DNA  Verbal Mem. - WNL  Emot - Pt endorsed mild dysphoria and anxiousness secodnary to current inpatient  Stay.  CONTINENT BOWEL. OCCASIONAL VOID. ESRD-HD. ON INSULIN. SKIN INTACT. LIDODERM PATCH TO LEFT ADDUCTOR TENDON. COURSE OF STEROID FOR LEFT THIGH JEREMI. CONSULTED ORTHO FOR INPUT.  [facility] - POSSIBLY Thursday DEPENDING ON FURTHER EVALUATIONS.            TEAM CONF - JUNE 21 -  DECLINED THERAPY DUE TO PAIN.  AT MOST RECENT THERAPY WHEN PARTICIPATED, TRANSFERS MIN. GAIT 80 FEET MIN / CTG MIN ASSIST RW. TOILET TRANSFERS CTG. TOILETING CTG. MODERATE IMPAIRED VERBAL MEMORY. PAIN MANAGEMENT - MEDS ADJUSTED - OXYCODONE 2.5 MG Q 6 HOURS PRN, TRAMADOL 25 MG Q 4 HOURS. MUSCLE BIOPSY RESULTS PENDING. STARTING ON PREDNISONE 60 MG DAILY YESTERDAY. WILL NEED TO ADJUST INSULING, BLOOD GLUCOSE > 300 THIS AM. GASTROENTEROLOGY EVALUATING LIVER. PATIENT DECLINES LIVER BIOPSY.  HYPOTHYROID - levothyroxine increased to 150mcg daily-continue this dose and she will need follow up TSH in 4-6 weeks from June 11 as an outpatient  ELOS -   1.5 WEEKS     TEAM CONF - June 28 - BED MIN  SIT TO SUPINE, SBA SUPINE TO SIT. CAR TRANSFERS CTG. TRANSFERS MIN ASSIST. 4 STAIRS MIN. GAIT 240 FEET CTG SBA. TOILET TRANSFERS MIN. UBB SBA. LBB MIN WITH LLE. UBD SET UP. LBD MOD ASSIST LLE.   INSULIN ADJUSTED FOR ELEVATED BLOOD GLUCOSE ON STEROIDS. ON PREDNISONE 60 MG DAILY AND PLAN TO TAPER OVER NEXT MONTH . BIOPSY REPORT SENT TO OUTPATIENT RHEUMATOLOGIST YESTERDAY. ADDITIONAL STUDIES ON BIOPSY PENDING.   BLADDER SCAN 400 CC BUT ONLY 200 CC ON  INTERMITTENT STRAIGHT CATH. LEFT QUAD MUSCLE BIOPSY SITE HEALING.  ESRD - HD.   ELOS - Thursday WITH HOME HEALTH PT, OT, AND NURSING FOR DIABETES AND MONITORING ON STEROIDS.   Addendum-was not able to do car transfer after hemodialysis on Friday, July 1 and continued with inpatient rehab course.    TEAM CONF - July 12 - BED MOD. TRASNFER MIN, AT TIMES MIN-MOD. GAIT CTG RW 60 FEET. LAST WORKED ON STAIRS July 2 4 STAIRS MIN ASSIST. TOILET TRANSFERS MIN. UBB SBA. LBB MIN MOD. UBD SET UP. LBD MOD MAX. OXYCODONE AND GABAPENTIN FOR PAIN. LEFT HYDROURETERNEPHROSIS. - CATHETERIZE ONCE A DAY. ON FLOMAX. HAS A RASH ON BACK/BUTTOCK - MICONAZOLE CREAM. APPOINTMENT WITH DR Cortez - RHEUMATOLOGY - ON Friday July 15. CK = 39 YESTERDAY. THYROID RECHECK - TSH 96.1 AND FREE T4 0.68 YESTERDAY - INTERNAL MEDICINE FOLLOWING.   ELOS - LOOKING AT SUBACUTE OPTIONS.     Rehabilitation-July 7-Patient has not been able to participate in therapy for 3 hours a day.  Will ask internal medicine to assess for transfer to the acute care wing in the hospital  July 8-Was evaluated by internal medicine service who did not feel that she needed transfer to the acute care wing of the hospit    Adonis Florentino MD       During rounds, used appropriate personal protective equipment including mask and gloves.  Additional gown if indicated.  Mask used was standard procedure mask. Appropriate PPE was worn during the entire visit.  Hand hygiene was completed before and after.

## 2022-07-21 NOTE — NURSING NOTE
HD WITHOUT INCIDENT OR C/O. TOLERATED WELL. NEW ORDER FOR UF RECEIVED. REMOVED 2  L.   DRSG. CURRENT. CDI WITH BIOPATCH.  NO MEDS ADMINISTERED.  STABLE, NO C/O UPON COMPLETION OF TREATMENT

## 2022-07-21 NOTE — PROGRESS NOTES
Received call from carin Mayer for [facility]. They have received pre-cert/auth to admit patient to their facility. Discussed timing of d/c with Dr. Floretnino and Dr. Palmer. Dr. Palmer okay with patient having dialysis tomorrow morning here at hospital and discharging in afternoon to [facility]. Patient would resume her MWF outpatient dialysis schedule starting on Monday, 7/25. [facility] okay with patient coming tomorrow afternoon. Discussed with patient's , by phone. He stated he talked with patient last night and she would really rather go home vs going to nursing home. He feels she would do better at home but told patient she needs to be able to go up/down steps and walk to bathroom. Discussed her PT note from yesterday. Patient had refused to try steps and still at Min assist level with transfers. Observed patient in PT afternoon session. PT had patient try steps. She could only get up one step and then could not do anymore. Another therapist had to get a chair quickly for patient to sit down. Patient was complaining of her left leg hurting. Called  back to let him know that patient did try steps and could not do them. Discussed her going to facility for a while longer to keep working to get stronger, but also encouraged him to get either a ramp built or get a portable ramp as this will probably be needed given her chronic health issues. He agreed that getting a ramp built would be good to do. He was discouraged that she couldn't do the steps. He is okay with plan for going to [facility] tomorrow afternoon and will transport patient by car. He will plan to be here around 4 p.m.   Also contacted daughter, by phone, to discuss above with her. She agrees it is best for patient to go to [facility] for continued therapy and time to get stronger.    plans to discuss further with patient this evening. Plan will be to d/c tomorrow afternoon to [facility].  Will assist with plans.

## 2022-07-21 NOTE — PLAN OF CARE
Problem: Rehabilitation (IRF) Plan of Care  Goal: Plan of Care Review  Recent Flowsheet Documentation  Taken 7/21/2022 0312 by Rachel Ponce, RN  Progress: improving  Plan of Care Reviewed With: patient  Outcome Evaluation: Patient appears to be sleeping well tonight. A&Ox4, cooperative. Pain medication and sleeping medication given PRN. C/O pain to L leg. BP elevated. Plan to DC today 7/21 thursday. Ultrafiltration to be done prior to leaving per report. COVID screening to be done in the AM.

## 2022-07-21 NOTE — PROGRESS NOTES
Inpatient Rehabilitation Plan of Care Note    Plan of Care  Care Plan Reviewed - No updates at this time.    Body Function Structure    [RN] Skin Integrity(Active)  Current Status(07/21/2022): Rash to back and lower buttocks.  Weekly Goal(07/26/2022): Rash to resolve.  Discharge Goal: No skin problem.    Performed Intervention(s)  Ointment per MD order      Body Systems    [RN] Genitourinary(Active)  Current Status(07/21/2022): Patient is a M,W,F HD patient with a tunneled  catheter to the R chest. Pt produces little urine.  pt oliguric. New cath order  6/20: straight cath daily and prn until cath residual < 300 ml x3 days.  Weekly Goal(07/26/2022): Pt will plan to transition to community dialysis.  Discharge Goal: Pt will transition to community dialysis.    [RN] Endocrine(Active)  Current Status(07/21/2022): Patient is a diabetic.  Weekly Goal(07/26/2022): Blood glucose will be controlled.  Discharge Goal: Patient will have medication regimen that she can manage at  home.    Performed Intervention(s)  Dialysis mwf  Monitor weight and I/O.  DM educator consult.  Accuchecks as ordered.      Pain    [RN] Pain Management(Active)  Current Status(07/21/2022): Pt has consistent pain to L groin/L medial upper  thigh . Muscle biopsy site to L thigh .  Weekly Goal(07/26/2022): Pain will be controlled.  Discharge Goal: Pt will have pain management regimen that she can follow at  home.    Performed Intervention(s)  Pain meds prn .  Ice to LLE per order      Psychosocial    [RN] Coping/Adjustment(Active)  Current Status(07/21/2022): Pt is A/Ox4. Pt is at increased risk for reduced  coping r/t hospitalization and comorbidities. Pt has been calling for assistance  appropriately as needed. Pt has supportive family.  Weekly Goal(07/26/2022): Pt will verbalize needs, feelings, concerns to staff as  needed.  Discharge Goal: Pt will verbalize adequate coping strategies to use at home.    Performed Intervention(s)  Provide distraction  and/or relaxation as needed.  Allow extra time for patient to verbalize needs, concerns, feelings, etc.      Safety    [RN] Potential for Injury(Active)  Current Status(07/21/2022): Patient is at increased risk for falls/injury r/t  hospitalization and generalized weakness.  Weekly Goal(07/26/2022): Patient will call appropriately for assistance as  needed.  Discharge Goal: Patient will remain free from falls/injury.    Performed Intervention(s)  Fall precautions: bed low and locked, chair alarm and bed alarms in use, nonskid  socks and gait belt in use, call light and personal belongings within reach.  Hourly rounding.      Sphincter Control    [RN] Bowel and bladder (Active)  Current Status(07/21/2022): Patient can be continent and is often incontinent of  stool. Patient is oliguric and is an HD patient. New cath order 6/20.  Weekly Goal(07/26/2022): Pt will be continent 75% of the time. Patient will have  BM at least q3 days or within normal patterns.  Discharge Goal: Patient will be continent 100% of the time.    Performed Intervention(s)  Monitor I/O.  Bowel meds prn.    Signed by: Rachel Pnoce RN

## 2022-07-21 NOTE — PLAN OF CARE
Goal Outcome Evaluation:  Plan of Care Reviewed With: patient        Progress: no change  Outcome Evaluation: Mrs. Martinez is down in hemodialysis at this time, particpated in all her therapies today, continues with LLE weakness and pain, taking scheudled gabapentin and PRN oxy IR to help with Left thigh pain, has not voided today to be scanned when back from HD, blood sugars elevated received meal time insulin at breakfast and lunch thus far, appetite good, plan to DC to [facility] after HD  to  and transport her there around 1600, no unsafe behaivors this shift.

## 2022-07-21 NOTE — THERAPY DISCHARGE NOTE
Inpatient Rehabilitation - IRF Occupational Therapy Treatment Note/Discharge  [facility]     Patient Name: Zaina Martinez  : 1965  MRN: [id number]  Today's Date: 2022               Admit Date: 2022       ICD-10-CM ICD-9-CM   1. Generalized weakness  R53.1 780.79   2. 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 Other insomnia  G47.09 780.52     Patient Active Problem List   Diagnosis   • Renal insufficiency   • Hypertensive disorder   • Hypothyroidism   • Type 2 diabetes mellitus with kidney complication, with long-term current use of insulin ([facility])   • Rheumatoid arthritis ([facility])   • Angioedema   • Esophageal dysmotility   • Anemia   • Medically noncompliant   • Myocardial infarction due to demand ischemia ([facility])   • Enteritis   • PRES (posterior reversible encephalopathy syndrome)   • Urine retention   • Klebsiella infection   • Superficial thrombophlebitis   • Generalized weakness   • ESRD (end stage renal disease) ([facility])   • CAD (coronary artery disease)   • Abnormal urinalysis   • Chronic diastolic CHF (congestive heart failure) ([facility])   • Pyelonephritis   • Calculus of gallbladder with acute on chronic cholecystitis without obstruction   • Pleural effusion on right   • Anemia due to chronic kidney disease, on chronic dialysis ([facility])   • Abnormal findings on diagnostic imaging of other specified body structures   • Acute upper respiratory infection   • Agitation   • Alkaline phosphatase raised   • Casts present in urine   • Cellulitis of toe   • Hip pain   • Community acquired pneumonia   • Depressive disorder   • Diarrhea of presumed infectious origin   • Difficult or painful urination   • Disease due to severe acute respiratory syndrome coronavirus 2 (SARS-CoV-2)   • Dyspnea   • Encounter for follow-up examination after completed treatment for conditions other than malignant neoplasm   • H/O: hypothyroidism   • Hyperlipidemia   • Hypomagnesemia   • Intractable vomiting  with nausea   • Leukocytosis   • Luetscher's syndrome   • Need for influenza vaccination   • Restless legs   • Noncompliance with treatment   • Shoulder pain   • Urinary tract infectious disease   • Metabolic encephalopathy   • Abnormal findings on diagnostic imaging of abdomen   • Status post cholecystectomy   • Hyponatremia   • Leukocytosis   • Acute metabolic encephalopathy   • Encephalopathy, toxic   • Acute CVA (cerebrovascular accident) (HCC)   • Intracranial hemorrhage (HCC)   • Stroke (HCC)   • Abnormal urinalysis   • Diabetic muscle infarction (HCC)   • Other insomnia     Past Medical History:   Diagnosis Date   • Acute CVA (cerebrovascular accident) (HCC) 5/1/2022   • Acute on chronic diastolic CHF (congestive heart failure) (HCC)    • CAD (coronary artery disease) 12/20/2021   • Diabetes (HCC)    • Disease of thyroid gland    • GERD (gastroesophageal reflux disease)    • Hyperlipidemia 11/30/2018   • Hypertension    • Rheumatoid arthritis (HCC)      Past Surgical History:   Procedure Laterality Date   • CHOLECYSTECTOMY WITH INTRAOPERATIVE CHOLANGIOGRAM N/A 1/10/2022    Procedure: Laparoscopic cholecystectomy with intraoperative cholangiogram;  Surgeon: Ramana Raygoza MD;  Location: [facility];  Service: General;  Laterality: N/A;   • EYE SURGERY     • HYSTERECTOMY     • INSERTION HEMODIALYSIS CATHETER N/A 12/6/2021    Procedure: HEMODIALYSIS CATHETER INSERTION;  Surgeon: Keli Salazar MD;  Location: [facility] 18/19;  Service: Vascular;  Laterality: N/A;   • INSERTION HEMODIALYSIS CATHETER N/A 5/3/2022    Procedure: TUNNELED CATHETER PLACEMENT;  Surgeon: Keli Salazar MD;  Location: [facility] OR;  Service: Vascular;  Laterality: N/A;   • MUSCLE BIOPSY Left 6/15/2022    Procedure: Left quadriceps muscle biopsy;  Surgeon: Marques Ma MD;  Location: [facility] OR;  Service: Neurosurgery;  Laterality: Left;       IRF OT ASSESSMENT FLOWSHEET (last 12 hours)     IRF OT  Evaluation and Treatment     Row Name 07/21/22 1540          OT Time and Intention    Document Type discharge evaluation;daily treatment  -AF     Mode of Treatment occupational therapy  -AF     Patient Effort adequate  -AF     Symptoms Noted During/After Treatment increased pain  -AF     Row Name 07/21/22 1540          General Information    Patient/Family/Caregiver Comments/Observations pt supine in bed in AM and wtih PT iN Pm  -AF     Existing Precautions/Restrictions fall;other (see comments)  contact precautions  -AF     Row Name 07/21/22 1540          Pain Assessment    Pretreatment Pain Rating 8/10  -AF     Posttreatment Pain Rating 8/10  -AF     Pain Location - Side/Orientation Left  -AF     Pain Location --  groin  -AF     Pre/Posttreatment Pain Comment NSG gave pain meds  -AF     Row Name 07/21/22 1540          Cognition/Psychosocial    Affect/Mental Status (Cognition) flat/blunted affect  -AF     Orientation Status (Cognition) oriented x 3  -AF     Follows Commands (Cognition) follows one-step commands  -AF     Personal Safety Interventions fall prevention program maintained;gait belt;nonskid shoes/slippers when out of bed  -AF     Row Name 07/21/22 1540          Range of Motion Comprehensive    Comment, General Range of Motion BUE AROM WFL  -AF     Row Name 07/21/22 1540          Strength Comprehensive (MMT)    Comment, General Manual Muscle Testing (MMT) Assessment generally 4/5 BUE  -AF     Row Name 07/21/22 1540          Vision Assessment/Intervention    Vision Assessment Comment limited, prior history of vision issues  -AF     Row Name 07/21/22 1540          Bathing    [place] Level (Bathing) bathing skills;upper body;set up;lower body;moderate assist (50% patient effort)  -AF     Position (Bathing) sink side;supported sitting;supported standing  -AF     Set-up Assistance (Bathing) obtain supplies  -AF     Row Name 07/21/22 1540          Upper Body Dressing    [place] Level (Upper Body  Dressing) upper body dressing skills;set up assistance  -AF     Position (Upper Body Dressing) supported sitting  -AF     Comment (Upper Body Dressing) w/c level  -AF     Row Name 07/21/22 1540          Lower Body Dressing    [place] Level (Lower Body Dressing) don;doff;pants/bottoms;shoes/slippers;dependent (less than 25% patient effort);maximum assist (25% patient effort);verbal cues  -AF     Position (Lower Body Dressing) supported sitting;supported standing  -AF     Comment (Lower Body Dressing) w/c level  -AF     Row Name 07/21/22 1540          Grooming    [place] Level (Grooming) grooming skills;set up  -AF     Position (Grooming) supported sitting  -AF     Set-up Assistance (Grooming) obtain supplies  -AF     Comment (Grooming) w/c level  -AF     Row Name 07/21/22 1540          Bed Mobility    Supine-Sit [place] (Bed Mobility) standby assist  -AF     Row Name 07/21/22 1540          Transfers    Bed-Chair [place] (Transfers) minimum assist (75% patient effort);verbal cues  -AF     Assistive Device (Bed-Chair Transfers) wheelchair;walker, front-wheeled  -AF     Sit-Stand [place] (Transfers) minimum assist (75% patient effort);contact guard  -AF     Stand-Sit [place] (Transfers) contact guard  -AF     Row Name 07/21/22 1540          Sit-Stand Transfer    Assistive Device (Sit-Stand Transfers) wheelchair  -AF     Row Name 07/21/22 1540          Stand-Sit Transfer    Assistive Device (Stand-Sit Transfers) wheelchair  -AF     Row Name 07/21/22 1540          Toilet Transfer    Comment, (Toilet Transfer) deferred  -AF     Row Name 07/21/22 1540          Motor Skills    Functional Endurance fair with ADL today  -AF     Row Name 07/21/22 1540          Shoulder (Therapeutic Exercise)    Shoulder Strengthening (Therapeutic Exercise) bilateral;scapular stabilization;sitting;2 lb free weight;15 repititions;2 sets  -AF     Row Name 07/21/22 1540          Elbow/Forearm (Therapeutic Exercise)     Elbow/Forearm Strengthening (Therapeutic Exercise) bilateral;flexion;extension;supination;pronation;sitting;2 lb free weight;15 repititions;2 sets  -AF     Row Name 07/21/22 1540          Wrist (Therapeutic Exercise)    Wrist Strengthening (Therapeutic Exercise) bilateral;flexion;extension;15 repititions;2 lb free weight;2 sets  -AF     Row Name 07/21/22 1540          Hand (Therapeutic Exercise)    Hand Strengthening (Therapeutic Exercise) bilateral; strengthening;hand gripper;15 repititions;2 sets  -AF     Row Name 07/21/22 1540          Balance    Static Sitting Balance independent  -AF     Dynamic Sitting Balance supervision  -AF     Static Standing Balance contact guard  -AF     Dynamic Standing Balance contact guard  -AF     Row Name 07/21/22 1540          Positioning and Restraints    Pre-Treatment Position in bed  -AF     Post Treatment Position wheelchair  -AF     In Wheelchair sitting;call light within reach;encouraged to call for assist;exit alarm on;with PT  with PT in AM and in room in PM  -AF           User Key  (r) = Recorded By, (t) = Taken By, (c) = Cosigned By    Initials Name Effective Dates    AF Tasha Helm, OTR 06/16/21 -               Wound 06/15/22 1312 Left anterior thigh Incision (Active)   Closure MARIANNE 07/20/22 2348   Base dressing in place, unable to visualize 07/20/22 2348   Drainage Amount none 07/21/22 0909       Occupational Therapy Education                 Title: PT OT SLP Therapies (In Progress)     Topic: Occupational Therapy (Resolved)     Point: ADL training (Resolved)     Description:   Instruct learner(s) on proper safety adaptation and remediation techniques during self care or transfers.   Instruct in proper use of assistive devices.              Learning Progress Summary           Patient Acceptance, E,TB,D, VU,NR by JS at 6/25/2022 1434      Show all documentation for this point (2)                 Point: Home exercise program (Resolved)     Description:   Instruct  learner(s) on appropriate technique for monitoring, assisting and/or progressing therapeutic exercises/activities.              Learning Progress Summary           Patient Acceptance, E,D,H, VU by AF at 6/30/2022 1544    Comment: review of HEP with hand weights and theraband      Show all documentation for this point (2)                 Point: Precautions (Resolved)     Description:   Instruct learner(s) on prescribed precautions during self-care and functional transfers.              Learning Progress Summary           Patient Acceptance, E,TB,D, VU,NR by  at 6/25/2022 1434      Show all documentation for this point (2)                 Point: Body mechanics (Resolved)     Description:   Instruct learner(s) on proper positioning and spine alignment during self-care, functional mobility activities and/or exercises.              Learning Progress Summary           Patient Acceptance, E,TB,D, VU,NR by  at 6/25/2022 1434      Show all documentation for this point (2)                             User Key     Initials Effective Dates Name Provider Type Discipline     06/16/21 -  Fany Lima, PT Physical Therapist PT    DIEGO 06/16/21 -  Tasha Helm OTR Occupational Therapist OT                OT Recommendation and Plan  Planned Therapy Interventions (OT): activity tolerance training, BADL retraining, cognitive/visual perception retraining, functional balance retraining, IADL retraining, neuromuscular control/coordination retraining, occupation/activity based interventions, patient/caregiver education/training, ROM/therapeutic exercise, strengthening exercise, transfer/mobility retraining           OT IRF GOALS     Row Name 07/21/22 1544             Transfer Goal 2 (OT-IRF)    Progress/Outcomes (Transfer Goal 2, OT-IRF) goal partially met  -AF              Bathing Goal 1 (OT-IRF)    Progress/Outcomes (Bathing Goal 1, OT-IRF) goal not met  -AF              Bathing Goal 2 (OT-IRF)    Progress/Outcomes (Bathing Goal 2,  OT-IRF) goal not met  -AF              UB Dressing Goal 1 (OT-IRF)    Progress/Outcomes (UB Dressing Goal 1, OT-IRF) goal met  -AF              UB Dressing Goal 2 (OT-IRF)    Progress/Outcomes (UB Dressing Goal 2, OT-IRF) goal met  -AF              LB Dressing Goal 1 (OT-IRF)    Progress/Outcomes (LB Dressing Goal 1, OT-IRF) goal not met  -AF              Toileting Goal 1 (OT-IRF)    Progress/Outcomes (Toileting Goal 1, OT-IRF) goal not met  -AF              Toileting Goal 2 (OT-IRF)    Progress/Outcomes (Toileting Goal 2, OT-IRF) goal not met  -AF              Strength Goal 1 (OT-IRF)    Progress/Outcomes (Strength Goal 1, OT-IRF) goal not met  -AF            User Key  (r) = Recorded By, (t) = Taken By, (c) = Cosigned By    Initials Name Provider Type    Tasha Metz OTJANICE Occupational Therapist                    Time Calculation:    Time Calculation- OT     Row Name 07/21/22 1546 07/21/22 1544          Time Calculation- OT    OT Start Time 1300  -AF 0830  -AF     OT Stop Time 1330  -AF 0930  -AF     OT Time Calculation (min) 30 min  -AF 60 min  -AF           User Key  (r) = Recorded By, (t) = Taken By, (c) = Cosigned By    Initials Name Provider Type    Tasha Metz, OTR Occupational Therapist                Therapy Charges for Today     Code Description Service Date Service Provider Modifiers Qty    44313127929 HC OT SELF CARE/MGMT/TRAIN EA 15 MIN 7/20/2022 Tasha Heml OTR GO 4    46263586006 HC OT THER PROC EA 15 MIN 7/20/2022 Tasha Helm, OTR GO 2    59661169588 HC OT SELF CARE/MGMT/TRAIN EA 15 MIN 7/21/2022 Tasha Helm OTR GO 4    79533836202 HC OT THER PROC EA 15 MIN 7/21/2022 Tasha Helm, OTR GO 2               OT Discharge Summary  Reason for Discharge: Discharge from facility  Outcomes Achieved: Patient able to partially acheive established goals  Discharge Destination: SNF    RAMO Mike  7/21/2022

## 2022-07-21 NOTE — PROGRESS NOTES
[facility] Progress Note      Patient Name: Zaina Martinez  : 1965  MRN: [id number]  Primary Care Physician:  Karson Oreilly MD  Date of admission: 2022    Subjective     Interval History:   Follow up ESRD. .     Seen and examined. No shortness of air or chest pain.    Review of Systems:   Denies chest pain or shortness of breath    Objective     Vitals:   Temp:  [98 °F (36.7 °C)-98.8 °F (37.1 °C)] 98.1 °F (36.7 °C)  Heart Rate:  [54-95] 59  Resp:  [18-20] 18  BP: (162-182)/(70-84) 162/73    Intake/Output Summary (Last 24 hours) at 2022 0920  Last data filed at 2022 0815  Gross per 24 hour   Intake 120 ml   Output 4000 ml   Net -3880 ml     Seen on dialysis    Physical Exam:   General:  in bed  Conversant.    HEENT: oral mucosa moist.   Neck:RIJ TDC  Lungs:clear to auscultation. Unlabored.    Heart:RRR no s3 or rub.  early syst m  Abdomen: + bs,soft, nontender   Extremities:1+ -2+ lower ext edema.    Neuro: speech more fluent . Moves all 4 ext.     Scheduled Meds:     aspirin, 81 mg, Oral, Daily  b complex-vitamin c-folic acid, 1 tablet, Oral, Daily  carvedilol, 25 mg, Oral, BID With Meals  cloNIDine, 0.3 mg, Oral, Q8H  gabapentin, 100 mg, Oral, Q24H  gabapentin, 300 mg, Oral, BID  hydrALAZINE, 100 mg, Oral, Q8H  insulin glargine, 10 Units, Subcutaneous, Q12H  insulin lispro, 0-14 Units, Subcutaneous, TID AC  levothyroxine, 200 mcg, Oral, Q AM  lidocaine, 1 patch, Transdermal, Q24H  losartan, 100 mg, Oral, Q24H  miconazole, 1 application, Topical, Q12H  pantoprazole, 40 mg, Oral, Q AM  predniSONE, 5 mg, Oral, Daily With Breakfast  rOPINIRole, 0.75 mg, Oral, Nightly  sertraline, 150 mg, Oral, Daily  sodium zirconium cyclosilicate, 5 g, Oral, Daily  spironolactone, 100 mg, Oral, Daily  tamsulosin, 0.4 mg, Oral, Daily      IV Meds:        Results Reviewed:   I have personally reviewed the results from the time of this admission to 2022 09:20 EDT     Results  from last 7 days   Lab Units 07/20/22 2015 07/18/22  1533 07/15/22  2207   SODIUM mmol/L 134* 126* 134*   POTASSIUM mmol/L 4.0 5.3* 4.2   CHLORIDE mmol/L 98 94* 98   CO2 mmol/L 28.0 20.0* 25.0   BUN mg/dL 10 42* 15   CREATININE mg/dL 1.47* 3.84* 2.03*   CALCIUM mg/dL 8.6 7.7* 8.2*   BILIRUBIN mg/dL  --   --  0.5   ALK PHOS U/L  --   --  451*   ALT (SGPT) U/L  --   --  16   AST (SGOT) U/L  --   --  36*   GLUCOSE mg/dL 82 161* 131*       Estimated Creatinine Clearance: 39.1 mL/min (A) (by C-G formula based on SCr of 1.47 mg/dL (H)).                Results from last 7 days   Lab Units 07/20/22 2015 07/18/22  1533 07/15/22  2207   WBC 10*3/mm3 10.15 11.46* 11.29*   HEMOGLOBIN g/dL 9.2* 8.4* 8.5*   PLATELETS 10*3/mm3 272 239 187             Assessment / Plan     ASSESSMENT:  1.  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 HD MWF.  2.  Intracerebral bleed and altered mental status.  Rehab .   3.  Infected TDC, s/p removal 5/1. Replaced. Staph aureus. completed vancomycin.  4.  DM2    5.  Coronary artery disease  6.  Acute on chronic diastolic dysfunction . Volume off with HD.   7.  Anemia of CKD, on long-acting ANDRAE as an outpatient.    8. Hypothyroidism.  TSH still very high and Free t4 low .  Her medications were adjusted  9. 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 On ASA.     PLAN:    1.  HD again today then friday. We will continue to adjust blood pressure medications as needed after dialysis  2.  Surveillance labs      Ruthann Palmer MD  07/21/22  09:20 EDT    [facility]  [phone]

## 2022-07-21 NOTE — THERAPY TREATMENT NOTE
Inpatient Rehabilitation - Physical Therapy Treatment Note       [facility]     Patient Name: Zaina Martinez  : 1965  MRN: [id number]    Today's Date: 2022                    Admit Date: 2022      Visit Dx:     ICD-10-CM ICD-9-CM   1. Generalized weakness  R53.1 780.79   2. Diabetic muscle infarction ([facility])  E11.69 250.80    M62.20 728.89   3. Other insomnia  G47.09 780.52       Patient Active Problem List   Diagnosis   • Renal insufficiency   • Hypertensive disorder   • Hypothyroidism   • Type 2 diabetes mellitus with kidney complication, with long-term current use of insulin ([facility])   • Rheumatoid arthritis ([facility])   • Angioedema   • Esophageal dysmotility   • Anemia   • Medically noncompliant   • Myocardial infarction due to demand ischemia ([facility])   • Enteritis   • PRES (posterior reversible encephalopathy syndrome)   • Urine retention   • Klebsiella infection   • Superficial thrombophlebitis   • Generalized weakness   • ESRD (end stage renal disease) ([facility])   • CAD (coronary artery disease)   • Abnormal urinalysis   • Chronic diastolic CHF (congestive heart failure) ([facility])   • Pyelonephritis   • Calculus of gallbladder with acute on chronic cholecystitis without obstruction   • Pleural effusion on right   • Anemia due to chronic kidney disease, on chronic dialysis ([facility])   • Abnormal findings on diagnostic imaging of other specified body structures   • Acute upper respiratory infection   • Agitation   • Alkaline phosphatase raised   • Casts present in urine   • Cellulitis of toe   • Hip pain   • Community acquired pneumonia   • Depressive disorder   • Diarrhea of presumed infectious origin   • Difficult or painful urination   • Disease due to severe acute respiratory syndrome coronavirus 2 (SARS-CoV-2)   • Dyspnea   • Encounter for follow-up examination after completed treatment for conditions other than malignant neoplasm   • H/O: hypothyroidism   • Hyperlipidemia   • Hypomagnesemia   • Intractable  vomiting with nausea   • Leukocytosis   • Luetscher's syndrome   • Need for influenza vaccination   • Restless legs   • Noncompliance with treatment   • Shoulder pain   • Urinary tract infectious disease   • Metabolic encephalopathy   • Abnormal findings on diagnostic imaging of abdomen   • Status post cholecystectomy   • Hyponatremia   • Leukocytosis   • Acute metabolic encephalopathy   • Encephalopathy, toxic   • Acute CVA (cerebrovascular accident) (HCC)   • Intracranial hemorrhage (HCC)   • Stroke (HCC)   • Abnormal urinalysis   • Diabetic muscle infarction (HCC)   • Other insomnia       Past Medical History:   Diagnosis Date   • Acute CVA (cerebrovascular accident) (HCC) 5/1/2022   • Acute on chronic diastolic CHF (congestive heart failure) (HCC)    • CAD (coronary artery disease) 12/20/2021   • Diabetes (HCC)    • Disease of thyroid gland    • GERD (gastroesophageal reflux disease)    • Hyperlipidemia 11/30/2018   • Hypertension    • Rheumatoid arthritis (HCC)        Past Surgical History:   Procedure Laterality Date   • CHOLECYSTECTOMY WITH INTRAOPERATIVE CHOLANGIOGRAM N/A 1/10/2022    Procedure: Laparoscopic cholecystectomy with intraoperative cholangiogram;  Surgeon: Ramana Raygoza MD;  Location: [facility]  Service: General;  Laterality: N/A;   • EYE SURGERY     • HYSTERECTOMY     • INSERTION HEMODIALYSIS CATHETER N/A 12/6/2021    Procedure: HEMODIALYSIS CATHETER INSERTION;  Surgeon: Keli Salazar MD;  Location: [facility] 18/19;  Service: Vascular;  Laterality: N/A;   • INSERTION HEMODIALYSIS CATHETER N/A 5/3/2022    Procedure: TUNNELED CATHETER PLACEMENT;  Surgeon: Keli Salazar MD;  Location: [facility]  Service: Vascular;  Laterality: N/A;   • MUSCLE BIOPSY Left 6/15/2022    Procedure: Left quadriceps muscle biopsy;  Surgeon: Marques Ma MD;  Location: [facility]  Service: Neurosurgery;  Laterality: Left;       PT ASSESSMENT (last 12 hours)     IR[facility] PT  Evaluation and Treatment     Row Name 07/21/22 0937          PT Time and Intention    Document Type daily treatment  -LB     Mode of Treatment physical therapy  -LB     Patient/Family/Caregiver Comments/Observations Seated in w/c. C/O L thigh pain  -LB     Row Name 07/21/22 0937          General Information    Existing Precautions/Restrictions fall;other (see comments)  contact precautions  -LB     Row Name 07/21/22 0937          Pain Assessment    Pretreatment Pain Rating 8/10  -LB     Pain Location - Side/Orientation Left  -LB     Pain Location - other (see comments)  thigh  -LB     Pre/Posttreatment Pain Comment premedicated. Pnt arrived w ice pack L thigh.  -LB     Row Name 07/21/22 0937          Bed Mobility    Comment, (Bed Mobility) NT. Up in w/c  -LB     Row Name 07/21/22 0937          Transfers    Sit-Stand [place] (Transfers) contact guard  -LB     Stand-Sit [place] (Transfers) contact guard  -LB     Row Name 07/21/22 0937          Sit-Stand Transfer    Assistive Device (Sit-Stand Transfers) walker, front-wheeled;wheelchair  -LB     Row Name 07/21/22 0937          Stand-Sit Transfer    Assistive Device (Stand-Sit Transfers) walker, front-wheeled;wheelchair  -LB     Row Name 07/21/22 0937          Gait/Stairs (Locomotion)    [place] Level (Gait) contact guard  -LB     Assistive Device (Gait) walker, front-wheeled  -LB     Distance in Feet (Gait) 8' x 2 in AM. In PM; 80', 40'  -LB     Pattern (Gait) step-to  -LB     Deviations/Abnormal Patterns (Gait) antalgic;gait speed decreased;kenn decreased;stride length decreased;weight shifting decreased  -LB     Bilateral Gait Deviations heel strike decreased;forward flexed posture  -LB     [place] Level (Stairs) moderate assist (50% patient effort)  -LB     Handrail Location (Stairs) both sides  -LB     Number of Steps (Stairs) 1  -LB     Ascending Technique (Stairs) step-to-step  -LB     Descending Technique (Stairs) step-to-step  -LB      Comment, (Gait/Stairs) stepped forward up 1 step , w rails. States that she cannot continue on steps. Stepped down backward.  -LB     Row Name 07/21/22 0937          Rough/Uneven Surface Gait Skills (Mobility)    [place], Gait on Rough/Uneven Surface (Mobility) contact guard  -LB     Assistive Device (Rough/Uneven Surface Gait) walker, front-wheeled  -LB     Distance in Feet (Rough/Uneven Surface Gait) 12  -LB     Comment, Gait Rough/Uneven Surface (Mobility) red mat  -LB     Row Name 07/21/22 0937          Balance    Comment, Balance While standing w Rwx, retrieved object from floor w reacher w cga.  -LB     Row Name 07/21/22 0937          Hip (Therapeutic Exercise)    Hip Isometrics (Therapeutic Exercise) bilateral;gluteal sets;aDduction;5 second hold;sitting  15 reps  -LB     Row Name 07/21/22 0937          Knee (Therapeutic Exercise)    Knee AROM (Therapeutic Exercise) LAQ (long arc quad);left;flexion;sitting;10 repetitions  -LB     Knee Strengthening (Therapeutic Exercise) right;flexion;resistance band;yellow;15 repititions;sitting  RLE LAQ 1# wgt  -LB     Row Name 07/21/22 0937          Ankle (Therapeutic Exercise)    Ankle AROM (Therapeutic Exercise) bilateral;dorsiflexion;plantarflexion;15 repititions;sitting  -LB     Row Name 07/21/22 0937          Positioning and Restraints    Post Treatment Position wheelchair  -LB     In Wheelchair sitting;call light within reach;exit alarm on  -LB           User Key  (r) = Recorded By, (t) = Taken By, (c) = Cosigned By    Initials Name Provider Type    LB Liz Rodriguez PTA Physical Therapist Assistant              Wound 06/15/22 1312 Left anterior thigh Incision (Active)   Closure MARIANNE 07/20/22 2348   Base dressing in place, unable to visualize 07/20/22 2348   Drainage Amount none 07/21/22 0909     Physical Therapy Education                 Title: PT OT SLP Therapies (In Progress)     Topic: Physical Therapy (In Progress)     Point: Mobility training (Done)      Learning Progress Summary           Patient Acceptance, E, VU,NR by LB at 7/21/2022 1243   Family Acceptance, E,D, VU,NR by LB at 7/13/2022 1456    Comment: Stairs, (dgt practiced). 2 Helpers for stairs used.  Attempted car tsf to dgts car. Unable to perform as the SUV was too high for pnt. A platform step was offered for practice, but pnt declined.  Pnt refused amb during fam teaching, 2ary to L thigh pain.   Significant Other Acceptance, E, VU by LB at 7/15/2022 1556    Comment: car tsf      Show all documentation for this point (43)                 Point: Home exercise program (In Progress)     Learning Progress Summary           Patient Acceptance, E,TB, NR by MS at 7/11/2022 1434      Show all documentation for this point (13)                 Point: Body mechanics (Done)     Learning Progress Summary           Patient Acceptance, E,D, VU,DU by DP at 7/18/2022 1550   Family Acceptance, E, VU by LB at 7/13/2022 1504      Show all documentation for this point (9)                 Point: Precautions (Done)     Learning Progress Summary           Patient Acceptance, E,D, VU,DU by DP at 7/18/2022 1550      Show all documentation for this point (9)                             User Key     Initials Effective Dates Name Provider Type Discipline    LB 03/07/18 -  Liz Rodriguez PTA Physical Therapist Assistant PT    MS 06/16/21 -  Teagan Santos PT Physical Therapist PT    DP 08/24/21 -  Jay Jim PT Physical Therapist PT                PT Recommendation and Plan      Patient was wearing a face mask during this therapy encounter. Therapist used appropriate personal protective equipment including mask and gloves.  Mask used was standard procedure mask. Appropriate PPE was worn during the entire therapy session. Hand hygiene was completed before and after therapy session. Patient is not in enhanced droplet precautions.                        Time Calculation:      PT Charges     Row Name 07/21/22 4100  07/21/22 0937          Time Calculation    Start Time 1230  -LB 0930  -LB     Stop Time 1300  -LB 1030  -LB     Time Calculation (min) 30 min  -LB 60 min  -LB           User Key  (r) = Recorded By, (t) = Taken By, (c) = Cosigned By    Initials Name Provider Type    Liz Henriquez PTA Physical Therapist Assistant                Therapy Charges for Today     Code Description Service Date Service Provider Modifiers Qty    28711836881 HC PT THER PROC EA 15 MIN 7/20/2022 Liz Rodriguez PTA GP 6    [id number] HC PT THER PROC EA 15 MIN 7/21/2022 Liz Rodriguez PTA GP 6            PT G-Codes  AM-PAC 6 Clicks Score (PT): 17      Liz Rodrgiuez PTA  7/21/2022

## 2022-07-22 VITALS
OXYGEN SATURATION: 94 % | BODY MASS INDEX: 26.9 KG/M2 | WEIGHT: 146.16 LBS | HEART RATE: 59 BPM | DIASTOLIC BLOOD PRESSURE: 75 MMHG | TEMPERATURE: 98 F | SYSTOLIC BLOOD PRESSURE: 174 MMHG | RESPIRATION RATE: 18 BRPM | HEIGHT: 62 IN

## 2022-07-22 LAB
ANION GAP SERPL CALCULATED.3IONS-SCNC: 11 MMOL/L (ref 5–15)
BUN SERPL-MCNC: 27 MG/DL (ref 6–20)
BUN/CREAT SERPL: 8.4 (ref 7–25)
CALCIUM SPEC-SCNC: 8.1 MG/DL (ref 8.6–10.5)
CHLORIDE SERPL-SCNC: 95 MMOL/L (ref 98–107)
CO2 SERPL-SCNC: 25 MMOL/L (ref 22–29)
CREAT SERPL-MCNC: 3.21 MG/DL (ref 0.57–1)
DEPRECATED RDW RBC AUTO: 50.1 FL (ref 37–54)
EGFRCR SERPLBLD CKD-EPI 2021: 16.3 ML/MIN/1.73
ERYTHROCYTE [DISTWIDTH] IN BLOOD BY AUTOMATED COUNT: 17.1 % (ref 12.3–15.4)
GLUCOSE BLDC GLUCOMTR-MCNC: 106 MG/DL (ref 70–130)
GLUCOSE BLDC GLUCOMTR-MCNC: 136 MG/DL (ref 70–130)
GLUCOSE BLDC GLUCOMTR-MCNC: 194 MG/DL (ref 70–130)
GLUCOSE SERPL-MCNC: 130 MG/DL (ref 65–99)
HCT VFR BLD AUTO: 28 % (ref 34–46.6)
HGB BLD-MCNC: 8.5 G/DL (ref 12–15.9)
MCH RBC QN AUTO: 24.9 PG (ref 26.6–33)
MCHC RBC AUTO-ENTMCNC: 30.4 G/DL (ref 31.5–35.7)
MCV RBC AUTO: 81.9 FL (ref 79–97)
PLATELET # BLD AUTO: 314 10*3/MM3 (ref 140–450)
PMV BLD AUTO: 10.1 FL (ref 6–12)
POTASSIUM SERPL-SCNC: 5 MMOL/L (ref 3.5–5.2)
RBC # BLD AUTO: 3.42 10*6/MM3 (ref 3.77–5.28)
SODIUM SERPL-SCNC: 131 MMOL/L (ref 136–145)
WBC NRBC COR # BLD: 12.11 10*3/MM3 (ref 3.4–10.8)

## 2022-07-22 PROCEDURE — 63710000001 PREDNISONE PER 5 MG: Performed by: PHYSICAL MEDICINE & REHABILITATION

## 2022-07-22 PROCEDURE — 80048 BASIC METABOLIC PNL TOTAL CA: CPT | Performed by: NURSE PRACTITIONER

## 2022-07-22 PROCEDURE — 85027 COMPLETE CBC AUTOMATED: CPT | Performed by: NURSE PRACTITIONER

## 2022-07-22 PROCEDURE — 82962 GLUCOSE BLOOD TEST: CPT

## 2022-07-22 PROCEDURE — 25010000002 HEPARIN (PORCINE) PER 1000 UNITS: Performed by: PHYSICAL MEDICINE & REHABILITATION

## 2022-07-22 RX ORDER — GABAPENTIN 300 MG/1
300 CAPSULE ORAL 2 TIMES DAILY
Qty: 60 CAPSULE | Refills: 1 | Status: SHIPPED | OUTPATIENT
Start: 2022-07-22 | End: 2022-08-04 | Stop reason: HOSPADM

## 2022-07-22 RX ORDER — ASPIRIN 81 MG/1
81 TABLET ORAL DAILY
Qty: 90 TABLET | Refills: 1
Start: 2022-07-22 | End: 2022-11-24 | Stop reason: HOSPADM

## 2022-07-22 RX ORDER — LEVOTHYROXINE SODIUM 0.2 MG/1
200 TABLET ORAL
Start: 2022-07-23 | End: 2022-07-25 | Stop reason: SDUPTHER

## 2022-07-22 RX ORDER — PREDNISONE 2.5 MG
7.5 TABLET ORAL
Qty: 21 TABLET | Refills: 0
Start: 2022-07-29 | End: 2022-07-25

## 2022-07-22 RX ORDER — OXYCODONE HYDROCHLORIDE 5 MG/1
5 TABLET ORAL EVERY 4 HOURS PRN
Qty: 60 TABLET | Refills: 0 | Status: ON HOLD | OUTPATIENT
Start: 2022-07-22 | End: 2022-10-02

## 2022-07-22 RX ORDER — PREDNISONE 1 MG/1
5 TABLET ORAL
Start: 2022-08-05 | End: 2022-07-25 | Stop reason: SDUPTHER

## 2022-07-22 RX ORDER — GABAPENTIN 100 MG/1
100 CAPSULE ORAL EVERY 24 HOURS
Qty: 30 CAPSULE | Refills: 1 | Status: SHIPPED | OUTPATIENT
Start: 2022-07-22 | End: 2022-07-25 | Stop reason: SDUPTHER

## 2022-07-22 RX ORDER — HYDRALAZINE HYDROCHLORIDE 10 MG/1
10 TABLET, FILM COATED ORAL EVERY 8 HOURS PRN
Start: 2022-07-22 | End: 2022-07-25 | Stop reason: SDUPTHER

## 2022-07-22 RX ORDER — ZOLPIDEM TARTRATE 5 MG/1
2.5 TABLET ORAL NIGHTLY PRN
Qty: 15 TABLET | Refills: 0 | Status: SHIPPED | OUTPATIENT
Start: 2022-07-22 | End: 2022-08-04 | Stop reason: HOSPADM

## 2022-07-22 RX ORDER — INSULIN LISPRO 100 [IU]/ML
0-14 INJECTION, SOLUTION INTRAVENOUS; SUBCUTANEOUS
Refills: 12
Start: 2022-07-22 | End: 2022-11-24 | Stop reason: HOSPADM

## 2022-07-22 RX ORDER — CLONIDINE HYDROCHLORIDE 0.3 MG/1
0.3 TABLET ORAL EVERY 8 HOURS SCHEDULED
Start: 2022-07-22 | End: 2022-07-25 | Stop reason: SDUPTHER

## 2022-07-22 RX ORDER — OXYCODONE HYDROCHLORIDE 5 MG/1
2.5 TABLET ORAL ONCE
Status: COMPLETED | OUTPATIENT
Start: 2022-07-22 | End: 2022-07-22

## 2022-07-22 RX ORDER — PREDNISONE 10 MG/1
10 TABLET ORAL
Qty: 6 TABLET | Refills: 0
Start: 2022-07-22 | End: 2022-07-25

## 2022-07-22 RX ORDER — POLYETHYLENE GLYCOL 3350 17 G/17G
17 POWDER, FOR SOLUTION ORAL DAILY PRN
Start: 2022-07-22 | End: 2023-02-01

## 2022-07-22 RX ORDER — SPIRONOLACTONE 100 MG/1
100 TABLET, FILM COATED ORAL DAILY
Start: 2022-07-22 | End: 2022-07-25 | Stop reason: SDUPTHER

## 2022-07-22 RX ADMIN — Medication 1 TABLET: at 15:55

## 2022-07-22 RX ADMIN — LOSARTAN POTASSIUM 100 MG: 100 TABLET, FILM COATED ORAL at 15:55

## 2022-07-22 RX ADMIN — ASPIRIN 81 MG: 81 TABLET, COATED ORAL at 15:54

## 2022-07-22 RX ADMIN — GABAPENTIN 300 MG: 300 CAPSULE ORAL at 08:39

## 2022-07-22 RX ADMIN — TAMSULOSIN HYDROCHLORIDE 0.4 MG: 0.4 CAPSULE ORAL at 15:55

## 2022-07-22 RX ADMIN — INSULIN GLARGINE-YFGN 10 UNITS: 100 INJECTION, SOLUTION SUBCUTANEOUS at 08:39

## 2022-07-22 RX ADMIN — PANTOPRAZOLE SODIUM 40 MG: 40 TABLET, DELAYED RELEASE ORAL at 06:04

## 2022-07-22 RX ADMIN — CLONIDINE HYDROCHLORIDE 0.3 MG: 0.1 TABLET ORAL at 06:04

## 2022-07-22 RX ADMIN — OXYCODONE 5 MG: 5 TABLET ORAL at 18:01

## 2022-07-22 RX ADMIN — SERTRALINE 150 MG: 100 TABLET, FILM COATED ORAL at 15:55

## 2022-07-22 RX ADMIN — SPIRONOLACTONE 100 MG: 100 TABLET ORAL at 15:55

## 2022-07-22 RX ADMIN — LEVOTHYROXINE SODIUM 200 MCG: 0.1 TABLET ORAL at 06:04

## 2022-07-22 RX ADMIN — SODIUM ZIRCONIUM CYCLOSILICATE 5 G: 5 POWDER, FOR SUSPENSION ORAL at 15:55

## 2022-07-22 RX ADMIN — OXYCODONE 5 MG: 5 TABLET ORAL at 11:26

## 2022-07-22 RX ADMIN — OXYCODONE 5 MG: 5 TABLET ORAL at 06:04

## 2022-07-22 RX ADMIN — HYDRALAZINE HYDROCHLORIDE 100 MG: 50 TABLET, FILM COATED ORAL at 15:55

## 2022-07-22 RX ADMIN — HEPARIN SODIUM 3800 UNITS: 1000 INJECTION INTRAVENOUS; SUBCUTANEOUS at 15:40

## 2022-07-22 RX ADMIN — LIDOCAINE 1 PATCH: 50 PATCH CUTANEOUS at 08:39

## 2022-07-22 RX ADMIN — CLONIDINE HYDROCHLORIDE 0.3 MG: 0.1 TABLET ORAL at 15:55

## 2022-07-22 RX ADMIN — OXYCODONE 2.5 MG: 5 TABLET ORAL at 14:27

## 2022-07-22 RX ADMIN — PREDNISONE 10 MG: 10 TABLET ORAL at 15:54

## 2022-07-22 RX ADMIN — HYDRALAZINE HYDROCHLORIDE 100 MG: 50 TABLET, FILM COATED ORAL at 06:04

## 2022-07-22 RX ADMIN — GABAPENTIN 100 MG: 100 CAPSULE ORAL at 15:55

## 2022-07-22 RX ADMIN — CARVEDILOL 25 MG: 25 TABLET, FILM COATED ORAL at 15:55

## 2022-07-22 NOTE — PROGRESS NOTES
SECTION GG      Mobility Performance Discharge:     Roll Left and Right: [place] provides verbal cues and/or touching/steadying  and/or contact guard assistance as patient completes activity. Assistance may be  provided throughout the activity or intermittently.   Sit to Lying: [place] provides verbal cues and/or touching/steadying and/or  contact guard assistance as patient completes activity. Assistance may be  provided throughout the activity or intermittently.   Lying to Sitting on Side of Bed: [place] provides verbal cues and/or  touching/steadying and/or contact guard assistance as patient completes  activity. Assistance may be provided throughout the activity or intermittently.   Sit to Stand: [place] provides verbal cues and/or touching/steadying and/or  contact guard assistance as patient completes activity. Assistance may be  provided throughout the activity or intermittently.   Chair/Bed to Chair Transfer: [place] does less than half the effort. [place]  lifts, holds or supports trunk or limbs but provides less than half the effort.   Car Transfer: [place] does less than half the effort. [place] lifts, holds or  supports trunk or limbs but provides less than half the effort.   Walk 10 Feet:   [place] provides verbal cues and/or touching/steadying and/or  contact guard assistance as patient completes activity. Assistance may be  provided throughout the activity or intermittently.  Walk 50 Feet with 2 Turns:   [place] provides verbal cues and/or  touching/steadying and/or contact guard assistance as patient completes  activity. Assistance may be provided throughout the activity or intermittently.  Walk 150 Feet:   Not attempted due to medical or safety concerns.  Walking 10 Feet on Uneven Surfaces:   [place] provides verbal cues and/or  touching/steadying and/or contact guard assistance as patient completes  activity. Assistance may be provided throughout the activity or intermittently.  1 Step Over Curb or Up/Down  Stair:   [place] does more than half the effort.  [place] lifts or holds trunk or limbs and provides more than half the effort.  4 Steps Up and Down, With/Without Rail:   [place] does all of the effort. Patient  does none of the effort to complete the activity. Or, the assistance of 2 or  more helpers is required for the patient to complete the activity.  12 Steps Up and Down, With/Without Rail:   Not attempted due to medical or  safety concerns.  Picking up an Object:   [place] provides verbal cues and/or touching/steadying  and/or contact guard assistance as patient completes activity. Assistance may be  provided throughout the activity or intermittently. Uses Wheelchair and/or  Scooter: No    Signed by: Liz Rodriguez PTA

## 2022-07-22 NOTE — PLAN OF CARE
Problem: Rehabilitation (IRF) Plan of Care  Goal: Plan of Care Review  Recent Flowsheet Documentation  Taken 7/22/2022 9086 by Rachel Ponce, RN  Progress: improving  Plan of Care Reviewed With: patient  Outcome Evaluation: Patient appears to be sleeping well tonight. A&Ox4, cooperative. Plan to DC today. To have HD today as well.

## 2022-07-22 NOTE — DISCHARGE SUMMARY
[facility] - REHABILITATION UNIT    ELY MOTTA  1965    ADMIT DATE:  5/13/2022  7:05 PM  DISCHARGE DATE:  07/22/22      CHIEF COMPLAINT:      S/p R CVA  Left thigh diabetic muscle infarct  ESRD- HD MWF  HTN  DM  Rheumatoid arthritis  Anemia  Elevated alkaline phosphatase - f/u GI as outpatient  Lymphadenopathy - f/u Hematology with PET scan in August      HISTORY OF PRESENT ILLNESS:    57 yo F with PMHx significant for HTN, HLD, DM, hypothyroid, RA, depression, ESRD on dialysis, CHF, and CAD presented on 4/27 with complaints of confusion subsequently found to be due to R MCA with resultant L hemiplegia. Patients hospital course complicated by hemodialysis catheter related infection with MRSA s/p removal, and labile blood glucose. Transferred to ARU 5/13/22 for Inpatient rehabilitation.    HOSPITAL COURSE:    Patient participated in a comprehensive acute inpatient rehabilitation program.     During the hospital stay the following areas were addressed:      # R MCA s/p TPA with subsequent ICH with debility  Initially held antiplatelet/anticoagulation.  Reviewed with neurology on May 20 and resume aspirin 81 mg daily  Recommend outpatient Neurology fu - repeat MRI to be scheduled when she is seen in follow-up by neurology and latter part of August        # DM  Insulin -  was adjusted during her hospital stay.  Her home dose is Lantus 10 units daily.  She is presently on prednisone and Lantus was increased to 10 units twice daily and may need further adjustment.  Continue blood glucose checks 4 times a day.  She is on a tapering dose of prednisone 10 mg for a week then 7.5 mg for a week, then to continue 5 mg daily until started on a biologic for her rheumatoid arthritis by rheumatology      # ESRD   Nephrology following  Hemodialysis Monday Wednesday Friday.  She occasionally had hemodialysis on consecutive days for hyperkalemia or for volume status.  She should continue with hemodialysis as an  outpatient 3 times a week.  Flomax  Hyperkalemia [facility]       Infectious disease-   # s/p catheter infection with MRSA  Vancomycin IV with stop date of May 26  #UTI-patient was treated for urinary tract infection during her hospital stay     #left hydroureteronephrosis-Dana 3-CT scan shows left hydro ureter nephrosis.  She had some previous dilation of the ureter on comparison the past studies but increased today.  Bladder noted to be markedly distended.  Will do in and out cath now and daily to decompress bladder.  Addendum-intermittent cath for 600 cc.  June 4-once daily intermittent cath 450 cc.  June 5-seen by urology-Nguyễn catheter placed with plans to recheck hydroureter on renal ultrasound in 3 to 5 days.  June 6-Nguyễn catheter placed yesterday by urology, with only 170 cc out so far.  Patient reports did not note any change in her left groin pain after the in and out cath with decompression of the bladder..  June 8-reviewed with urology service-request noncontrast CT scan stone protocol to evaluate for resolution of the left hydronephrosis with urology planning to see tomorrow to review the films and then make recommendations, urology would recommend leaving the Nguyễn catheter in for the time being.  June 9 - improved left hydroureter on CT , Nguyễn discontinued.   July 1-to follow-up with urology as an outpatient with follow-up CT planned in August.  She occasionally required intermittent straight cath but this was very infrequent.  Home health nursing to follow.  Continues on Flomax  July 7-intermittent straight cath 400 cc  July 11-She continues with hydroureteronephrosis on the left side on follow-up CT scan-intermittent straight cath volumes have not been over about 400 cc over the past month.  Urology re-consulted for updated assessment  July 12-plan is intermittent straight cath routinely once or twice a day  July 14 - ISC for 150 cc  July 22-patient should continue with intermittent straight cath  routinely once or twice a day depending on her volume output.  Follow-up with urology as an outpatient for repeat imaging to assess left hydroureteronephrosis           #Anemia-  EPO.  June 6-hemoglobin 7.8  July 12-hemoglobin 8.0  July 22-hemoglobin 8.5    #Mild leukocytosis on steroids     #Lymphadenopathy-evaluated by hematology oncology while here.  No significant change from scans earlier this year.  She is to repeat a scan in 3 months and follow-up with hematology oncology  July 1156-[facility]-mu CT of the abdomen pelvis shows lymphadenopathy overall about the same per her report.  To have follow-up with hematology oncology and repeat scan in another 2 months or so     #Elevated alkaline phosphatase  June 6-elevated alkaline phosphatase 770, up from 289 one week ago, 140 one month ago. Similar elevation in March, Feb even higher at 1,330 ( intra-abdominal fluid collection, underwent CT-guided aspiration  Revealed blood and cultures did not reveal any growth and therefore antibiotics  discontinued.  Surgery also did evaluate and signed off.), and elevated during admission in January ( She was seen by general surgery who recommended and performed laparoscopic cholecystectomy during that admission).   ALT 70, AST 87, Total bilirubin 0.8. Ca 8.3.  ? If liver source or bone or due to an inflammatory response.  June 7-GGT also elevated.  Seen by gastroenterology.  [place] biliary source.  Liver ultrasound ordered  June 8-liver ultrasound was unremarkable  June 21 - patient declined liver biopsy.   June 27-gastroenterology following peripherally-plans to follow-up as an outpatient and review option of liver biopsy again if alkaline phosphatase remains elevated  June 28-remains elevated at 503  June 30-alkaline phosphatase lower at 426  July 6-trend back up to 634  July 8-alkaline phosphatase unchanged 627  July 12-alkaline phosphatase 551  July 15 - alkaline phosphatase 451  July 22-patient is to follow-up with  gastroenterology as an outpatient       #Pain - neuropathy BL lower extremity/left thigh pain  Gabapentin/oxycodone.    June 7-patient with lethargy this morning.  May be due to urinary tract infection but with recent increase and gabapentin and oxycodone, will change gabapentin to 200 mg twice a day and decrease oxycodone from 5 back down to 2.5 mg p.o. every 4 hours as needed  June 8-better speed with her processing today  June 21 - pain med regimen recently adjusted with tramadol 25 mg q 4 hours prn, gabapentin 300 g bid, lidoderm patch, oxycodone 2.5 mg q 6 hours prn. Prednisone 60 mg daily added which may help with pain from inflammatory process. Reports pain better today and participated better in therapies.   June 27 - continue present regimen  July 1-home on oxycodone 5 mg p.o. every 4 hours.  Pain dispense #40, gabapentin 300 mg twice daily, Tylenol 500 mg every 6 hours as needed  July 5-increase gabapentin slightly 300-100-300 mg.  Watch for any myoclonic twitching  July 8-had some lethargy earlier today, patient feels that she is tolerating oxycodone and gabapentin.  Was alert when seen on rounds.  July 22-patient is discharged on gabapentin 300 mg a morning, 100 mg in the afternoon, 3 mg in the evening.  Has not had any myoclonus..  Tolerating regimen.  She is on oxycodone 5 mg p.o. every 4 hours as needed pain which she has been taking for pain at the left thigh. - to  taper off of oxycodone over time.  She describes a burning pain at the left thigh and occasionally aching.  She was placed on prednisone for some active synovitis in her hands by rheumatology on July 16.  She has not noted any significant change yet in terms of pain complaints.        #L Hip Pain/thigh pain/lymphadenopathy-started around Carlos May 22 with initially tenderness along the left adductor tendons, and then on Wednesday, May 25 developed prominent edema in the left thigh that morning  DDX: Initial differential included adductor  tendonopathy versus infectious process versus inflammatory process  Present working diagnosis is suspected diabetic muscular infarct left thigh   June 6 -Seen by orthopedics with no surgical indication.  Seen by infectious disease service with no acute infectious process noted.  Evaluated by hematology regarding lymphadenopathy, lymph node felt too small to biopsy.  Patient was on a course of low-dose prednisone 10 mg for 3 days then 5 mg for 2 days and then another 10 mg dose with out any significant improvement in the swelling or pain.  She has a history of rheumatoid arthritis.  See CT of the left thigh and MRI of the left thigh and pelvis reports   With lumbar radiculoplexitis and diabetic amyotrophy, on review of the literature do not see edema that she presents with associated with the findings or MRI changes in the tendon and musculature with edema.  There is descriptions of MRI changes in the plexus and peripheral nerve.  In terms of treatment options for diabetic amyotrophy , there have not been definitive clinical trials.  Immunomodulation with steroids has been inconclusive as well as other immunomodulation therapy.  Reviewed with the patient  that  feel that she would benefit from seeing her rheumatologist as an outpatient to evaluate this further, as there does appear to be inflammatory cause given the lymphadenopathy and edema.  Rheumatologist does not come to the hospital.  CPK x2 has been normal.  Aldolase has been normal.  May need to look at biopsy of left thigh muscle to assess further.   June 8-patient reviewed with neurology yesterday who will assess and also provide input regarding whether muscle biopsy may be helpful.  June 9 - Discussed with Neurology and Rheumatology - plan is for EMG and then muscle biopsy.   Dana 15 - Left quadricep muscle biopsy was completed 6/15, results pending.  Labs: CKs have been normal, ANCA panel 6/11 was unremarkable  June 21 - started on full treatment dose of  Prednisone 60 mg daily yesterday pending path results. Reports pain better so far today. Specimen sent to [facility]. Clinical impression presently focal inflammatory myositis pending final pathology results.   June 22-pain continues better today.  June 23-outside pathology report still pending  June 24 - Patient reports left thigh pain better over past couple days but still present. Does not have the soreness at medial thigh as before. Complains of pain at mid-thigh. No change in swelling. Does have discomfort with proximal movement in LLE. Walked 320 feet CTG SBA RW today.   Pain improved on steroid. Discontinued atorvastatin. Biopsy results returned from [facility] - see report -Type 2 fiber atrophy, advanced. Denervation/reinnervation. No evidence of inflammatory myopathy or vasculitis. [facility] lab pursuing a dystrophy panel and will report findings in an addendum.  Reviewed with Neurology - as shown symptomatic response, continue Prednisone 60 mg daily for about one more week, then taper off over month.   June 27 - continue present regimen  June 28-increased pain medication regimen to oxycodone 5 mg every 4 hours as needed for severe pain.   July 1- On review with Rheumatology and Neurology, suspicion is for diabetic muscle infarction. Treatment would be aspirin which she is already on. Discussed with Neurology and as been on Prednisone 60 mg for only 1.5 week, will stop and not do taper. Discussed with Internal Medicine who will adjust insulin.  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  Reviewed with the patient that treatment for this is aspirin which she is already on.  She may do walking activities but would avoid strenuous activities with left quadricep strengthening.  She may strengthen the other 3 extremities as tolerated.  Reviewed may take 3 months to  resolve.  July 7-continues with left thigh pain.  She is noted to have increased edema in both lower extremities but particularly the left thigh compared to recent.  Lokelma has been associated with fluid retention which may explain increase edema in part.  Continues on aspirin for suspected diabetic muscular infarct.  No update report yet on additional studies for muscle biopsy.  Initial report was June 24 with additional studies planned  July 11-updated MRI imaging of the left thigh this past weekend.  See report.  She has appointment with rheumatology for further assessment later this week. -will recheck CK level for updated baseline as about 4 weeks out now from her biopsy which could have affected level.  Previous CK levels were not elevated.  White blood cell count has normalized which would hold against any active infection.  Edema at the left thigh appears about the same.  Continues with pain on the left side with pain inhibition with proximal muscles.  July 12-CPK was 39 yesterday.  Patient reviewed with pathologist at [facility] today-no additional results yet-they are to call when have additional results  July 15-appointment with rheumatology this afternoon.  Patient reviewed with rheumatologist earlier today.  No additional records update yet on additional studies muscle biopsy  July 16 - Reviewed with Rheum today. Thought remains diabetic muscle infarct, continue ASA.  Start Prednisone 5mg to see if rheumatoid arthritis contributing to some pain and then f/up with Rheum outpatient for biologic agent.  July 19-does not notice any significant change in pain in his left thigh  July 21-patient describes her pain overall about the same.  No change in her energy level.  Patient reviewed with her rheumatologist who stated for her rheumatoid symptoms   could increase prednisone to 10 mg with breakfast for 1 week, then 7.5 mg daily with breakfast for 1 week, then back to 5 mg dose.  July 22-continue on  aspirin therapy for diabetic muscle infarct in the left thigh.  No additional report yet on additional studies from the left thigh muscle biopsy from [facility] pathology.  Plan is to recheck with the pathology department for any additional results next Tuesday.    #Rheumatology-history of rheumatoid arthritis.  Was seen by rheumatology for evaluation of her left thigh pain and swelling with MRI changes-impression is diabetic muscle infarct and is to continue on aspirin therapy.  She was noted to have synovitis in her hands and that she previously felt better when she was on high-dose steroid trial, rheumatology started prednisone 5 mg daily on July 16.  She had not noted significant change in terms of her achiness or symptoms and prednisone was increased on July 22 to 10 mg daily for 7 days, then 7.5 mg daily for 7 days, then to continue 5 mg daily with breakfast.  She is to follow-up with rheumatology as an outpatient and to start on a biologic agent for her rheumatoid arthritis with plans to discontinue prednisone at that time.        # HTN   Coreg  Clonidine  Hydralazine  Losartan  Spironolactone  Nephrology/internal medicine following     #Hypothyroidism  Levothyroxine  June 9 - recheck TSH- addendum Dana 10- On Nov 30, 2021 , on Dec 2, 2021 T4 = 0.90, on Dec 9, T4=1.68. On May 7, TSH = 134. Has been on Levothyroxine 125 mcg/day it appears since at least Dec 13, 2021. See Dr Templeton's discharge note from Dec 17,2021 which discusses thyroid labs/dosing at that time. On June 9, 2022 TSH = 54.4.   Check free T4. Will get evaluation from Internal Medicine regarding thyroid studies/levothyroxine dosing.  June 11 - levothyroxine increased to 150mcg daily-continue this dose and she will need follow up TSH in 4-6 weeks from June 11 as an outpatient  July 11-repeat TSH and T4 ordered for Monday, July 11 July 12-TSH still elevated, higher at 96.  Levothyroxine increased to 200 mcg daily and separate  from other meds.  Free T4 equal 0.68  July 22-patient is to have thyroid function studies tested approximately 1 month from July 11, 2022     #CAD  ASA held in setting of ICH - restart aspirin 81 mg daily on May 20, 2022 per Neurology     #Depression   Sertraline  June 28-sertraline dose increased  July 5-reviewed with psychiatry service-continue present regimen sertraline 150 mg daily     Insomnia-improved on Ambien 5 mg  July 6-fatigue during the day-decrease Ambien 2.5 mg nightly and assess response  July 7-tolerated Ambien 2.5  July 22-continues on Ambien 2.5 mg nightly     #GERD   PPI     #Restless leg syndrome  Requip     #DVT prophylaxis-SCDs ordered but patient refusing.  Per neurology note May 11-continue off anticoagulation/antiplatelet for now. Restarted ASA 81 mg on May 20.  May 16-reviewed with patient and try venous foot compression devices  May 20-also not able to tolerate venous foot compression devices.  May 25-bilateral lower extremity venous duplex negative for DVT  July 9-  venous duplex negative for DVT left lower extremity     Diarrhea-July 7-loose stool x5 over the last day.  Lokelma is not associated with diarrhea.  C. difficile was negative on July 5.  Was on cefdinir from June 7 to June 13 and cefazolin one-time dose on Dana 15.  - will recheck C. Difficile  July 11-C. difficile was negative x2.  Diarrhea resolved        -  comprehensive inpatient rehabilitation program working with PT, OT, SLP for a minimum of three hours per day, five days per week. - will monitor for progress     TEAM CONF - MAY 17- BED SBA. TRANSFER MIN. 4 STAIRS MIN. GAIT 160 FEET CTG RW. SLOW TO PROCESS. BATH MIN. LBD MIN. UBD MIN. GROOMING SET UP. TOILETING MIN . COGNITION OVERALL MILD DEFICITS. VISUAL IMPAIRMENT IMPACTS SOME TESTING. ESRD-HD. ANTIBIOTICS - VANCOMYCIN 10.90 YESTERDAY.  ELOS - 1-2 WEEKS.      TEAM CONF - MAY 24 - ALWAYS SO PROCESSING AFTER EACH DIALYSIS SESSION. AT HOME WOULD GO BACK TO HOME AND  SLEEP. BED MIN ASSIST. TRANSFERS MIN. GAIT 80   FEET RW CTG. 4 STAIRS CTG. TOILET TRASNFERS AND SHOWER TRANSFERS MIN CTG. BATH CTG. LBD CTG. UBD SET UP. GROOMING SET UP. TOILETING CTG.   LEFT GROIN - ADDUCTOR PAIN - There is mild joint space narrowing involving both hips symmetrically. There are also some minimal degenerative changes involving both hips. The overall appearance is similar to the study of 12/16/2021. MODERATE WORD RETRIEVAL DEFICITS. IMPAIRED VISION AFFECTS HOME MANAGEMENT TASKS. HYPOGLLYCEMIA AFTER SSI .DECREASED TO 0-7 UNITS.   ELOS - ONE WEEK.         TEAM CONF - MAY 31 - TRANSFERS CTG. GAIT 40- FEET CTG RW LIMITED BY THIGH PAIN. TOILET TRANSFERS CTG. BATH CTG. LBD CTG. UBD SET UP. TOILETING CTG.  GROOMING SET UP. COGNITION - MODERATE WORD RETRIEVAL DEFICITS, MODERATE IMPAIRED MEMORY IMPACTED BY FATIGUE, STILL SLOW PROCESSING.  BNE (Active)  Att'n. - Mildly Imp.  Exec. Fx. - Mildly Imp., slowed processing speed  Rsng/Jgmnt - WNL  Arith - Mod Imp.  Visuospatial Skills - DNA, pt declined citing poor vision  Visual Mem. DNA  Verbal Mem. - WNL  Emot - Pt endorsed mild dysphoria and anxiousness secodnary to current inpatient  Stay.  CONTINENT BOWEL. OCCASIONAL VOID. ESRD-HD. ON INSULIN. SKIN INTACT. LIDODERM PATCH TO LEFT ADDUCTOR TENDON. COURSE OF STEROID FOR LEFT THIGH JEREMI. CONSULTED ORTHO FOR INPUT.  ELOS - POSSIBLY Thursday DEPENDING ON FURTHER EVALUATIONS.            TEAM CONF - JUNE 21 -  DECLINED THERAPY DUE TO PAIN.  AT MOST RECENT THERAPY WHEN PARTICIPATED, TRANSFERS MIN. GAIT 80 FEET MIN / CTG MIN ASSIST RW. TOILET TRANSFERS CTG. TOILETING CTG. MODERATE IMPAIRED VERBAL MEMORY. PAIN MANAGEMENT - MEDS ADJUSTED - OXYCODONE 2.5 MG Q 6 HOURS PRN, TRAMADOL 25 MG Q 4 HOURS. MUSCLE BIOPSY RESULTS PENDING. STARTING ON PREDNISONE 60 MG DAILY YESTERDAY. WILL NEED TO ADJUST INSULING, BLOOD GLUCOSE > 300 THIS AM. GASTROENTEROLOGY EVALUATING LIVER. PATIENT DECLINES LIVER BIOPSY.  HYPOTHYROID -  levothyroxine increased to 150mcg daily-continue this dose and she will need follow up TSH in 4-6 weeks from June 11 as an outpatient  ELOS -   1.5 WEEKS     TEAM CONF - June 28 - BED MIN  SIT TO SUPINE, SBA SUPINE TO SIT. CAR TRANSFERS CTG. TRANSFERS MIN ASSIST. 4 STAIRS MIN. GAIT 240 FEET CTG SBA. TOILET TRANSFERS MIN. UBB SBA. LBB MIN WITH LLE. UBD SET UP. LBD MOD ASSIST LLE.   INSULIN ADJUSTED FOR ELEVATED BLOOD GLUCOSE ON STEROIDS. ON PREDNISONE 60 MG DAILY AND PLAN TO TAPER OVER NEXT MONTH . BIOPSY REPORT SENT TO OUTPATIENT RHEUMATOLOGIST YESTERDAY. ADDITIONAL STUDIES ON BIOPSY PENDING.   BLADDER SCAN 400 CC BUT ONLY 200 CC ON INTERMITTENT STRAIGHT CATH. LEFT QUAD MUSCLE BIOPSY SITE HEALING.  ESRD - HD.   ELOS - Thursday WITH HOME HEALTH PT, OT, AND NURSING FOR DIABETES AND MONITORING ON STEROIDS.   Addendum-was not able to do car transfer after hemodialysis on Friday, July 1 and continued with inpatient rehab course.     TEAM CONF - July 12 - BED MOD. TRASNFER MIN, AT TIMES MIN-MOD. GAIT CTG RW 60 FEET. LAST WORKED ON STAIRS July 2 4 STAIRS MIN ASSIST. TOILET TRANSFERS MIN. UBB SBA. LBB MIN MOD. UBD SET UP. LBD MOD MAX. OXYCODONE AND GABAPENTIN FOR PAIN. LEFT HYDROURETERNEPHROSIS. - CATHETERIZE ONCE A DAY. ON FLOMAX. HAS A RASH ON BACK/BUTTOCK - MICONAZOLE CREAM. APPOINTMENT WITH DR Cortez - RHEUMATOLOGY - ON Friday July 15. CK = 39 YESTERDAY. THYROID RECHECK - TSH 96.1 AND FREE T4 0.68 YESTERDAY - INTERNAL MEDICINE FOLLOWING.   [facility] - LOOKING AT SUBACUTE OPTIONS.       TEAM CONF - July 19 - BED MOD TO GET LLE INTO BED. TRANSFERS MIN. GAIT 40 FEET CTG MIN. TOILET TRANSFERS MIN. SHOWER TRANSFERS MIN.   UBB SBA. LBD MIN MOD. UBD SET UP. LBD MOD MAX. GROOMING SET UP. TOILETING MIN MOD. ON PREDNISONE 5 MG DAILY. BLOOD GLUCOSE ELEVATED ON PREDNISONE. ISC FOR LEFT HYDROURETERONEPHROSIS ONCE OR TWICE A DAY DEPENDING ON VOLUME.    [facility] - WORKING ON SUBACUTE     Disposition-July 22-maximize benefit from inpatient rehab  program.  To continue with  activities at a subacute rehab level.  Cleared for discharge per nephrology.  She should follow-up with rheumatology as an outpatient as well as neurology.      Physical Exam near the time of discharge:    MENTAL STATUS: Awake alert  HEENT:  NCAT  CARDIOVASCULAR: Sinus rhythm    CHEST:  hemodialysis access right chest  RESPIRATORY: Normal respirations.      ABDOMEN: Nondistended.  Normoactive bowel sounds     EXTREMITIES: Left thigh edema .  Edema at the left thigh appears similar..  Also has some edema in the left lower leg as well as right lower leg      No myoclonus.  NEUROLOGIC:    Takes resistance in the bilateral upper extremities, right lower extremity and distal left lower extremity.  Pain inhibition proximally in the left lower extremity but at least antigravity left knee extension       RESULTS:  Glucose   Date/Time Value Ref Range Status   07/22/2022 1058 194 (H) 70 - 130 mg/dL Final     Comment:     Meter: [id number] : [id number] Jose Angel Harrisa NA   07/22/2022 0642 136 (H) 70 - 130 mg/dL Final     Comment:     Meter: [id number] : [id number] Dulal Sue NA   07/21/2022 2036 201 (H) 70 - 130 mg/dL Final     Comment:     Meter: [id number] : [id number] Dulal Sue NA   07/21/2022 1833 105 70 - 130 mg/dL Final     Comment:     Meter: [id number] : [id number] Cricket Harley NA   07/21/2022 1117 184 (H) 70 - 130 mg/dL Final     Comment:     Meter: [id number] : [id number] Cricket BOWERS'Shakira Harley NA   07/21/2022 0634 239 (H) 70 - 130 mg/dL Final     Comment:     Meter: [id number] : [id number] Dulal Sue NA   07/20/2022 2020 88 70 - 130 mg/dL Final     Comment:     Meter: [id number] : [id number] Dulal Sue NA   07/20/2022 1122 165 (H) 70 - 130 mg/dL Final     Comment:     Meter: [id number] : [id number] Randolph SAAVEDRA     Results from last 7 days   Lab Units 07/22/22  0659 07/20/22 2015 07/18/22  1533   WBC 10*3/mm3 12.11* 10.15 11.46*    HEMOGLOBIN g/dL 8.5* 9.2* 8.4*   HEMATOCRIT % 28.0* 30.5* 27.2*   PLATELETS 10*3/mm3 314 272 239     Results from last 7 days   Lab Units 07/22/22  0659 07/20/22 2015 07/18/22  1533 07/15/22  2207   SODIUM mmol/L 131* 134* 126* 134*   POTASSIUM mmol/L 5.0 4.0 5.3* 4.2   CHLORIDE mmol/L 95* 98 94* 98   CO2 mmol/L 25.0 28.0 20.0* 25.0   BUN mg/dL 27* 10 42* 15   CREATININE mg/dL 3.21* 1.47* 3.84* 2.03*   CALCIUM mg/dL 8.1* 8.6 7.7* 8.2*   BILIRUBIN mg/dL  --   --   --  0.5   ALK PHOS U/L  --   --   --  451*   ALT (SGPT) U/L  --   --   --  16   AST (SGOT) U/L  --   --   --  36*   GLUCOSE mg/dL 130* 82 161* 131*        Latest Reference Range & Units 05/05/22 05:57 05/27/22 11:30 06/06/22 11:50 06/14/22 09:17 07/11/22 23:25   Creatine Kinase 20 - 180 U/L 97 94 90 121 39   Aldolase 3.3 - 10.3 U/L  5.6               EXAM:    CT Head Without Intravenous Contrast-May 9, 2022     CLINICAL HISTORY:     Reason for exam: headache.     TECHNIQUE:    Axial computed tomography images of the head brain without intravenous   contrast.  CTDI is 54.8 mGy and DLP is 974.4 mGy-cm.  This CT exam was   performed according to the principle of ALARA (As Low As Reasonably   Achievable) by using one or more of the following dose reduction   techniques: automated exposure control, adjustment of the mA and or kV   according to patient size, and or use of iterative reconstruction   technique.     Comparison:  5 1 2022     FINDINGS:    Brain:  Unchanged parenchymal hematoma in the superior left frontal   lobe measuring 1.9 x 1.4 x 2.7 cm.  Mild surrounding edema.  No new   hemorrhage..    Ventricles:  Unremarkable.  No ventriculomegaly.    Bones joints:  Unremarkable.  No acute fracture.    Soft tissues:  Unremarkable.    Sinuses:  Unremarkable as visualized.  No acute sinusitis.    Mastoid air cells:  Unremarkable as visualized.  No mastoid effusion.     IMPRESSION:       Unchanged parenchymal hematoma in the superior left frontal lobe with    mild surrounding edema.  No new hemorrhage.  IMPRESSION:     ====================      CT LEFT FEMUR WITHOUT CONTRAST-May 25, 2022     HISTORY: Left thigh swelling. Swelling from the groin to the knee.      TECHNIQUE: CT includes axial imaging from above the left hip joint to  the knee joint without IV contrast.     COMPARISON: X-rays of the pelvis/left hip 05/23/2022.     FINDINGS: There is generalized edema within the subcutaneous fat about  the pelvis, left hip, left thigh, and left knee. This is nonspecific and  may be associated with cellulitis. CT evaluation for myositis is limited  and there is low density within the left adductor saloni muscle that  could represent myositis though is difficult to differentiate from  artifact. There is no evidence for abscess or drainable collection.  Diffuse atherosclerotic calcifications are present. Mild arthritic  changes are present at the left hip and knee. No fracture or acute  osseous abnormality is evident. There is left inguinal radha  enlargement. A left inguinal node measures 1.5 cm short axis. An  additional left inguinal, proximal anteromedial thigh node measures 2.7  x 1.2 cm. Left inguinal nodes are increased in size when compared to the  previous CT 02/23/2022.     IMPRESSION:  1. Generalized edema within the subcutaneous fat about the pelvis, left  hip, left thigh, extending to the left knee. This is nonspecific and may  be associated with cellulitis. There is no evidence for abscess or  drainable collection. There is low density of the left hip adductor  musculature which could be artifactual though could also represent  myositis. No abnormal soft tissue gas. No left hip or left femoral  fracture.  2. Left inguinal and proximal anteromedial left thigh radha enlargement  has developed when compared to prior exam 02/23/2022 and may be  associated with lymphadenitis/reactive radha enlargement.  3. Diffuse atherosclerotic disease.     Radiation dose reduction  techniques were utilized, including automated  exposure control and exposure modulation based on body size.     This report was finalized on 5/26/2022 8:43 AM      =========================      MRI PELVIS AND MRI LEFT FEMUR WITHOUT CONTRAST-Dana 3, 2022     HISTORY: Left hip and thigh pain with tenderness at the left hip  adductor musculature. Extensive chronic medical history. No known  injury.     TECHNIQUE: MRI of the pelvis was performed using axial and coronal T1  and STIR sequences and MRI of the proximal two thirds of the left thigh  was performed using axial and coronal T1 and STIR sequences which show  both thighs and a sagittal proton density sequence which shows the left  thigh. These are correlated with consultation notes as well as abdomen  and pelvis CT 06/02/2022 and left hip CT 05/25/2022.     PELVIS MRI FINDINGS: There is diffuse third spacing as well as some  patient motion on every sequence. Marrow signal throughout the pelvis  and the visualized proximal femurs is normal. Small volume of peritoneal  fluid. A normal volume of fluid is present at each hip. Cartilage at the  hips appears normal. The symphysis pubis and the SI joints appear  normal. The lower lumbar spinal canal is obscured. The hip abductor  musculature and tendons appear normal. Muscles around the pelvis appear  normal.     MRI LEFT FEMUR FINDINGS: There is similar diffuse third spacing and  motion artifact. However, there is asymmetric, prominent soft tissue  edema in the left thigh with muscular and intramuscular edema along the  medial thigh and short adductor musculature of the left hip. There is no  well-demarcated, focal fluid collection. Additionally, there is no focal  lesion demonstrating signal features suggestive of hematoma. The fibers  of the adductor brevis and adductor longus muscles appear thickened,  edematous, and disorganized along their distal half, tapering more than  those at the opposite leg distally. The  left adductor saloni is  edematous but does not appear disrupted or disorganized. That muscle is  symmetrical to the opposite hip. The left hamstring mechanism is intact.  There is some edema of the left quadriceps musculature distally but no  muscle deformity.     IMPRESSION:  Diffuse third spacing throughout the pelvis and in the  thighs with asymmetrically prominent soft tissue edema in the left  thigh. Muscular deformity in the left thigh appears most pronounced at  the left hip adductor longus and brevis muscles. The somewhat  disorganized appearance to those muscles distally as well as their  asymmetry suggests a mechanical injury with muscle strain/tear. Myositis  is the other primary differential consideration. There is no focal  hematoma or abscess collection. There is no joint effusion or osseous  deformity.     This report was finalized on 6/3/2022 5:20 PM   =====================      MRI OF THE ABDOMEN WITH MRCP IMAGING WITHOUT CONTRAST-June 18, 2022     CLINICAL HISTORY: Abnormal liver function tests     COMPARISON: CT scan of the abdomen and pelvis without contrast dated  06/20/2022.     The images are degraded due to patient breathing motion artifact. The  liver is normal in size and configuration. There is no definite evidence  of cirrhosis. The liver shows normal signal intensity. Detection of  liver masses is suboptimal without contrast. However no definite solid  or cystic masses are identified within the liver. There is no bile duct  dilatation. There is no evidence of choledocholithiasis. The gallbladder  is absent. The pancreatic duct was not demonstrated, but is certainly  not dilated. The pancreas is difficult to delineate and appears  atrophic. The spleen and visualized kidneys are unremarkable. A small  left pleural effusion is noted.     IMPRESSIONS: Suboptimal study due to patient breathing motion artifact.  Atrophic pancreas. Otherwise unremarkable MRI of the abdomen. No  discrete liver  masses are identified on these noncontrast enhanced  images..     This report was finalized on 6/18/2022 4:48 PM      =============================      Exam: CT of the abdomen and pelvis without IV contrast.  June 8, 2022           HISTORY: 56-year-old evaluate for left-sided hydronephrosis     TECHNIQUE: Radiation dose reduction techniques were utilized, including  automated exposure control and exposure modulation based on body size.   3 mm images were obtained through the abdomen and pelvis without IV  contrast. Lack of contrast limits evaluation of solid, visceral, and  vascular structures. Sensitivity for underlying infection and lesions  decreased..      COMPARISON: Ultrasound 06/07/2022, CT 06/02/2022     FINDINGS:  Lower chest: Small bilateral pleural effusions with bibasilar airspace  disease left greater than right similar to the prior. Cardiomegaly.  Diffuse low-attenuation of the intravascular blood pool just anemia.  Right central venous catheter with the tip terminating in the right  atrium. Small pericardial effusion..     Liver: Morphologic changes suggestive of chronic liver disease with  heterogeneous enhancement which limits evaluation for underlying liver  lesions. Recommend correlation with history/laboratory values and  continued surveillance.     Cholecystectomy.     Spleen: Within normal limits.     Pancreas: Not well visualized. Atrophic..     Adrenals: Bilateral thickening left greater than right.     Kidneys:  Mild left-sided hydroureteronephrosis with dilatation  particularly involving the distal ureter somewhat improved from the  prior exam. No definitive radiopaque filling defect however evaluation  is limited on this noncontrast exam. Diffuse thickening of the urinary  bladder which is decompressed by Nguyễn catheter with perivesical  stranding. Right-sided hydronephrosis. Extensive intrarenal vascular  calcifications.     Bowel:  Circumferential thickening of the distal esophagus with  mildly  prominent subjacent nodes similar to the prior. The stomach is  moderately distended. No obstruction. Moderate colonic stool burden.  Ensure up-to-date with colonoscopy..     Peritoneum: Mesenteric edema with small volume ascites.     Vasculature:    Extensive atherosclerosis thoracoabdominal aorta and  branch vessels similar to the prior. Vascular evaluation is limited  without IV contrast..     Lymph Nodes:  Bulky abdominal and pelvic adenopathy particularly in the  retroperitoneum with an index 1.5 cm left para-aortic node, 1.4 cm  aortocaval node, and 1.2 cm left common iliac node. Pelvic adenopathy  extends along the iliac chain to the groin similar in appearance to the  prior. An index left inguinal node is 1.8 cm..                                                             Pelvic organs: Diffuse thickening of the urinary bladder which is  decompressed by Nguyễn catheter. Prostate in situ. Extensive vascular  calcifications. Presacral edema..     Abdominal/Pelvic Wall: Diffuse anasarca. Asymmetric edema/thickening  involving the musculature of the medial thigh/abductor compartments with  poor visualization of the fat planes. Vascular evaluation is limited  without contrast.     Multilevel degenerative changes thoracolumbar spine and pelvis.  Fragmentation along the superior endplate of T12 with sclerosis similar  to the most recent exam but increased from prior imaging. No definitive  retropulsion. Evaluation of the canal is limited on this noncontrast  exam.     IMPRESSION:  1. Small bilateral pleural effusions with bibasilar airspace disease  left greater than right.  2. Morphologic changes of chronic liver disease with small volume  ascites, mesenteric edema, and anasarca. Recommend continued follow-up  with contrast imaging for better evaluation.  3. Abdominal and pelvic adenopathy similar to the prior recommend  continued close surveillance or PET/CT as metastatic disease could have  a similar  appearance.  4. Improvement in mild left-sided hydroureteronephrosis with dilatation  particularly involving the distal ureter. Although no definitive  radiopaque filling defects are appreciated urothelial lesion or  stricture not excluded. Recommend urology consultation and subsequent  follow-up with CT urogram for better evaluation of the  tract.  5. Diffuse thickening of the urinary bladder can be seen with incomplete  distention or cystitis.  6. Asymmetric edema particularly involving the medial compartment of the  left thigh with diffuse edema/masslike thickening of the abductor/medial  compartment better visualized on the prior MRI. Nonspecific findings can  be seen with myositis, trauma, or infection. Please refer to the prior  MRI and follow-up short-term after treatment.  7. Increased sclerosis and fragmentation of the T12 vertebral bodies is  similar to the most recent exam but increased from February 2022 as  previously suggested recommend MRI for further evaluation as findings  can be seen with infection or pathologic fracture.  8. Please see above for additional findings/recommendations.     This report was finalized on 6/8/2022 10:01 PM     =================================    Updated imaging July 10, 2022  ========================  CT OF THE CHEST WITHOUT CONTRAST; CT OF THE ABDOMEN AND PELVIS WITHOUT  CONTRAST July 10, 2022     HISTORY: Adenopathy     COMPARISON: 06/08/2022     TECHNIQUE: Axial CT imaging was obtained from the thoracic inlet through  the symphysis pubis. No IV contrast was administered.     FINDINGS:  CT CHEST: Right internal jugular vein tunneled dialysis catheter extends  into the right atrium. The heart is enlarged. Hyperdense appearance to  the interventricular septum can be seen in the setting of anemia. There  is trace pericardial fluid. There are small bilateral pleural effusions,  left greater than right. Left pleural effusion particular appears  smaller. The thyroid gland,  trachea, and esophagus appear unremarkable.  Prominent supraclavicular lymph nodes are again noted. For example, one  on the right measures up to 9 mm, unchanged when compared to prior exam.  Subcarinal lymph node is stable in size at 1.2 cm. Scattered groundglass  infiltrates are seen within the lungs bilaterally. Appearance is  nonspecific. Some of this may represent edema, although correlation with  any evidence of infection is recommended. Areas of atelectasis are also  noted at the lung bases. Appearance of T12 is stable when compared to  the prior exam. No new osseous lesions are seen. There are enlarged  axillary nodes. For example, a right axillary lymph node measures 1.2 cm  in short axis dimensions. Previously, it measured 1.1 cm. Left axillary  nodes have increased in size. For example, a left axillary node measures  up to 1.1 cm. Previously, it measured approximately 8 mm. There is body  wall edema.     CT OF THE ABDOMEN AND PELVIS: No suspicious hepatic lesions are seen.  The stomach and duodenum are normal. Adrenal glands are stable in  appearance pancreas appears grossly normal. Gallbladder is surgically  absent. There is left-sided hydroureteronephrosis. This has worsened  when compared to prior exam. Exact etiology is uncertain. It may be  secondary to a distal ureteral stricture. There are prominent  retroperitoneal lymph nodes. For example, a left para-aortic lymph node  measures 1.4 cm in short axis dimensions, previously measuring 1.8.  There is calcification of the aorta. Urinary bladder is thick-walled.  Correlation with urinalysis and urine cultures is recommended. It does  appear distended. There is presacral edema and a small amount of free  fluid within the pelvis. Large volume of stool is noted throughout the  colon, suggesting constipation. There are dense vascular calcifications.  Prior study demonstrated asymmetric edema involving the left thigh.  Patient is noted to have edema within  both thighs on the current study.  There is asymmetric thickening of the left medial thigh musculature.  This does appear improved when compared to prior exam. Enlarged left  inguinal node measures 1.2 cm in short axis dimensions. It is probably  stable in size at 1.2 cm. No acute osseous abnormalities are seen within  the pelvis.     IMPRESSION:     1. Mediastinal and supraclavicular nodes are stable in size. Left  axillary nodes may have increased.  2. Scattered groundglass infiltrates within both lungs. These are new  when compared to prior exam. Both edema and infiltrates would be in the  differential.  3. Worsening left-sided hydroureteronephrosis. Appearance may be related  to stenosis of the distal ureter. However, consideration for  cystoscopy/ureteroscopy versus CT urogram is again suggested.  4. Overall thick-walled appearance to the urinary bladder. Correlation  with urinalysis and urine cultures is recommended.  5. Retroperitoneal adenopathy has increased. Given some potential  increasing adenopathy, consideration for PET is suggested.     =====================    MRI LEFT FEMUR WITHOUT CONTRAST-July 11, 2022     HISTORY: Left hip and thigh pain.     TECHNIQUE: MRI left femur includes axial T1, STIR as well as coronal T1,  STIR and sagittal PD fat-saturated sequences.     COMPARISON: CT abdomen and pelvis 07/10/2022. MRI pelvis and left femur  on 06/03/2022, CT left lower extremity 05/25/2022.     FINDINGS: There is progressive T2 hyperintense signal associated with  the musculature of the left thigh when compared to the prior exam  06/03/2022. There is developed increased T2 signal rather diffusely  within the hamstring, quadriceps, adductor muscular groups. Progression  of T2 hyperintense signal is best demonstrated involving the quadriceps  musculature. There are segments of the mid and distal vastus medialis  oblique muscle where there is fluid signal and minimal visualized muscle  fibers. There is  partial sparing of segments of the rectus femoris  muscle.     There is also right thigh muscular edema best demonstrated involving the  right adductor musculature, right vastus medialis, intermedius, vastus  lateralis oblique muscles.     Extensive third spacing of fluid is present about the pelvis and both  femora. The femoral bone marrow signal appears normal and there is no  fracture. There is no compelling evidence for septic arthritis in either  hip or the knee.     IMPRESSION:  1. Progressive edema and fluid replacement of left thigh musculature  with coalescent edema greatest within the mid to distal vastus medialis  oblique muscle though involving all muscles/muscle groups of the left  thigh. Findings suggest advanced myositis with potential areas of  myonecrosis or pyomyonecrosis and this may be associated with  rhabdomyolysis.  2. Progressive muscle edema involving the right thigh musculature  greatest within the mid and distal vastus medialis and lateralis oblique  muscles.  3. Third spacing of fluid with diffuse edema subcutaneous fat about the  pelvis and both thighs.  4. No evidence for left femoral fracture or bone marrow signal  abnormality.         Discharge Medications      New Medications      Instructions Start Date   b complex-vitamin c-folic acid 0.8 MG tablet tablet   1 tablet, Oral, Daily      carvedilol 25 MG tablet  Commonly known as: COREG   25 mg, Oral, 2 Times Daily With Meals      cloNIDine 0.3 MG tablet  Commonly known as: CATAPRES   0.3 mg, Oral, Every 8 Hours Scheduled      epoetin brooke-epbx 66028 UNIT/ML injection  Commonly known as: RETACRIT   10,000 Units, Subcutaneous, 3 Times Weekly      gabapentin 300 MG capsule  Commonly known as: NEURONTIN   300 mg, Oral, 2 Times Daily      gabapentin 100 MG capsule  Commonly known as: NEURONTIN   100 mg, Oral, Every 24 Hours      insulin lispro 100 UNIT/ML injection  Commonly known as: ADMELOG  Replaces: insulin lispro 100 UNIT/ML  injection   0-14 Units, Subcutaneous, 3 Times Daily Before Meals      Insulin Pen Needle 32G X 4 MM misc   Use to inject insulin daily      lidocaine 5 %  Commonly known as: LIDODERM   1 patch, Transdermal, Every 24 Hours Scheduled, 4% patch - over the counter - Remove & Discard patch within 12 hours or as directed by MD      losartan 100 MG tablet  Commonly known as: COZAAR   100 mg, Oral, Every 24 Hours Scheduled      miconazole 2 % cream  Commonly known as: MICOTIN   1 application, Topical, Every 12 Hours Scheduled      oxyCODONE 5 MG immediate release tablet  Commonly known as: ROXICODONE   5 mg, Oral, Every 4 Hours PRN, Taper off over time      polyethylene glycol 17 g packet  Commonly known as: MIRALAX   17 g, Oral, Daily PRN      predniSONE 10 MG tablet  Commonly known as: DELTASONE   10 mg, Oral, Daily With Breakfast, From Saturday July 23 to Thursday July 29      predniSONE 2.5 MG tablet  Commonly known as: DELTASONE   7.5 mg, Oral, Daily With Breakfast, From Friday July 29 to Thursday August 4   Start Date: July 29, 2022     predniSONE 5 MG tablet  Commonly known as: DELTASONE   5 mg, Oral, Daily With Breakfast, Starting Friday August 5, 2022 and continue on that dose   Start Date: August 5, 2022     sodium zirconium cyclosilicate 5 g pack  Commonly known as: LOKELMA   5 g, Oral, Daily      spironolactone 100 MG tablet  Commonly known as: ALDACTONE   100 mg, Oral, Daily      zolpidem 5 MG tablet  Commonly known as: AMBIEN   2.5 mg, Oral, Nightly PRN         Changes to Medications      Instructions Start Date   acetaminophen 500 MG tablet  Commonly known as: TYLENOL  What changed:   · medication strength  · how much to take  · when to take this   500 mg, Oral, Every 6 Hours PRN      hydrALAZINE 100 MG tablet  Commonly known as: APRESOLINE  What changed: when to take this   100 mg, Oral, Every 8 Hours Scheduled      hydrALAZINE 10 MG tablet  Commonly known as: APRESOLINE  What changed: You were already  taking a medication with the same name, and this prescription was added. Make sure you understand how and when to take each.   10 mg, Oral, Every 8 Hours PRN      insulin glargine 100 UNIT/ML injection  Commonly known as: LANTUS, SEMGLEE  What changed: when to take this   10 Units, Subcutaneous, Every 12 Hours Scheduled      levothyroxine 200 MCG tablet  Commonly known as: SYNTHROID, LEVOTHROID  What changed:   · medication strength  · how much to take  · when to take this   200 mcg, Oral, Every Early Morning   Start Date: July 23, 2022     sertraline 50 MG tablet  Commonly known as: ZOLOFT  What changed:   · medication strength  · how much to take   150 mg, Oral, Daily         Continue These Medications      Instructions Start Date   aspirin 81 MG EC tablet   81 mg, Oral, Daily      folic acid 1 MG tablet  Commonly known as: FOLVITE   1 mg, Oral, Daily      pantoprazole 40 MG EC tablet  Commonly known as: PROTONIX   40 mg, Oral, Daily      rOPINIRole 0.25 MG tablet  Commonly known as: REQUIP   0.75 mg, Oral, Every Night at Bedtime      tamsulosin 0.4 MG capsule 24 hr capsule  Commonly known as: FLOMAX   1 capsule, Oral, Daily      vitamin D3 125 MCG (5000 UT) capsule capsule   2,000 Units, Oral, Daily         Stop These Medications    amLODIPine 5 MG tablet  Commonly known as: NORVASC     furosemide 40 MG tablet  Commonly known as: LASIX     HYDROcodone-acetaminophen 5-325 MG per tablet  Commonly known as: NORCO     insulin lispro 100 UNIT/ML injection  Commonly known as: humaLOG  Replaced by: insulin lispro 100 UNIT/ML injection     metoprolol tartrate 25 MG tablet  Commonly known as: LOPRESSOR     multivitamin with minerals tablet tablet     OLANZapine 5 MG tablet  Commonly known as: zyPREXA          Gabapentin doses 300 mg in the morning, 100 mg in the afternoon, 300 mg at bedtime    Lantus is 10 units subcutaneous twice daily but will need to be adjusted as prednisone is adjusted    =========================     Karson Oreilly MD Winders, Mark T., En[facility] [phone] [employer]  [facility] [address]  [facility] [address] Steps: Follow up on 7/19/2022Appointment: Instructions: At 2:00 P. M.  Please arrive at 1:45 P.M.    ===    John Tovar MD Thornton, James Benjamin, [phone] [address] [address] Steps: Follow up on 8/31/2022Appointment: Instructions: Follow up august 31, 2022 @ 9:00 am    ===    Brittanie Cortez MD June, Lisa Ann, [phone] [facility] [address] Steps: Follow upAppointment: Instructions: Needs updated appointment time entered    ===    Marques Ma MD Reiss, Steven J, [phone] [address] [address]Tms[place] Steps: Appointment: Instructions: 7/1 Per Marjan, she will send message to the doctor & will call back.Satnam Fierro MD Prendergast, Neal J, [phone] [facility] [address]Rdbw Steps: Follow up on 9/12/2022Appointment: Instructions: On Sept. 2, at 8:30 A.M. for CT Scan  and  On Sep. 16, at 8:30 A.M. 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  -----------   7/1  - Please tell to the Pt. to call at the office at this # [phone] & ask for Cecilia.    ===    Adonis Florentino MD Gormley, John Michael, [facility] Medicine and [phone] [facility] [address]  [facility] [id number] Steps: Follow up on 8/8/2022Appointment: Instructions: At 9:30 A.M.    ===    Reynaldo Sotelo MD El-Haddad, Boutros, [phone] [facility] [address]  [facility]  [id number] Steps: Follow upAppointment: Instructions: 7/01  The doctor will see  the Pt. in the dialyses.    ===    Recheck thyroid studies-levothyroxine increased to 200 mcg daily-continue this dose and she will need follow up TSH in 4 weeks from July 11 as an outpatient levothyroxine increased to 200 mcg daily-continue this dose and she will need follow up TSH in 4 weeks from July 11 as an outpatientNext Steps: Follow up on 8/11/2022Appointment: Instructions:     ===    Marv Gallagher MD Dobozi, Brian M, [phone] [address] [address]Npnn Steps: Follow up on 7/18/2022Appointment: Instructions: At 10:45 A.M. with RIKA Birmingham.    ===  Hold Hydralazine 14:00 dose on hemodialysis  days Mon, Wed, Fri    Hemodialysis Kym-Wri-Npwdfe    Regular solid, thin liquid, consistent carbohydrate, low potassium 2gm, renal, 1200 cc fluid restriction daily.     Intermittent straight cath once a day scheduled and prn - has dilated left ureter    Blood glucose check 4 times a day    Labs as directed by Nephrology    Ice pack to left thigh 20 minutes every 2 hours as needed    Neurology to order follow up MRI brain at time of outpatient follow up in August    PT/OT - With suspected diabetic muscle infarction left thigh, no strenuous strengthening exercises left quadriceps or left hamstrings. Able to work on functional tasks, transfers, gait, stairs, strengthening LUE/RUE/RLE.      >30 on discharge    Adonis Florentino MD

## 2022-07-22 NOTE — PROGRESS NOTES
SECTION GG    Eating Performance Discharge: [place] sets up or cleans up; patient completes  activity. [place] assists only prior to or following the activity.    Signed by: Lory Cardenas RN

## 2022-07-22 NOTE — PROGRESS NOTES
SECTION GG      Self Care Performance Discharge:   Oral Hygiene: Supply provides verbal cues and/or touching/steadying and/or  contact guard assistance as patient completes activity.   Toileting Hygiene: : Supply does less than half the effort. Supply lifts, holds  or supports trunk or limbs but provides less than half the effort.   Shower/Bathe Self: Supply does less than half the effort. Supply lifts, holds  or supports trunk or limbs but provides less than half the effort.   Upper Body Dressing: Supply sets up or cleans up; patient completes activity.  Supply assists only prior to or following the activity.   Lower Body Dressing: Supply does more than half the effort. Supply lifts or  holds trunk or limbs and provides more than half the effort.   Putting On/Taking Off Footwear: Supply does all of the effort. Patient does  none of the effort to complete the activity. Or, the assistance of 2 or more  helpers is required for the patient to complete the activity.    Mobility Toilet Transfer Discharge: Supply does less than half the effort.  Supply lifts, holds or supports trunk or limbs but provides less than half the  effort.    Signed by: Tasha Helm, OT

## 2022-07-22 NOTE — PROGRESS NOTES
Patient to d/c to [facility] this afternoon.  taking by car. Discussed with patient and , by phone.  Packet to be sent with patient with scripts, d/c summary, PT d/c summary, and AVS.   Notified patient's outpatient dialysis center, [facility] on [address], that patient being d/c to [facility] and gave them number to facility. Patient will return to her regular outpatient dialysis schedule on Monday, 7/25.

## 2022-07-22 NOTE — PLAN OF CARE
Goal Outcome Evaluation:  Plan of Care Reviewed With: patient        Progress: improving  Outcome Evaluation: Called report to [facility]. Patient ready for discharge.  will transport patient to facility.

## 2022-07-22 NOTE — PROGRESS NOTES
Inpatient Rehabilitation Plan of Care Note    Plan of Care  Care Plan Reviewed - No updates at this time.    Safety    Performed Intervention(s)  Fall precautions: bed low and locked, chair alarm and bed alarms in use, nonskid  socks and gait belt in use, call light and personal belongings within reach.  Hourly rounding.      Psychosocial    Performed Intervention(s)  Provide distraction and/or relaxation as needed.  Allow extra time for patient to verbalize needs, concerns, feelings, etc.      Body Systems    Performed Intervention(s)  Dialysis mwf  Monitor weight and I/O.  DM educator consult.  Accuchecks as ordered.      Sphincter Control    Performed Intervention(s)  Monitor I/O.  Bowel meds prn.      Pain    Performed Intervention(s)  Pain meds prn .  Ice to LLE per order      Body Function Structure    Performed Intervention(s)  Ointment per MD order    Signed by: Lory Cardenas RN

## 2022-07-22 NOTE — PROGRESS NOTES
[facility] Progress Note      Patient Name: Zaina Martinez  : 1965  MRN: [id number]  Primary Care Physician:  Karson Oreilly MD  Date of admission: 2022    Subjective     Interval History:   Follow up ESRD. .     Seen and examined. No shortness of air or chest pain.    Review of Systems:   Denies chest pain or shortness of breath    Objective     Vitals:   Temp:  [97.8 °F (36.6 °C)-98.1 °F (36.7 °C)] 98 °F (36.7 °C)  Heart Rate:  [53-55] 55  Resp:  [18] 18  BP: (152-177)/(73-77) 152/77    Intake/Output Summary (Last 24 hours) at 2022 0842  Last data filed at 2022 0800  Gross per 24 hour   Intake 480 ml   Output 3950 ml   Net -3470 ml     Seen on dialysis    Physical Exam:   General:  in bed  Conversant.    HEENT: oral mucosa moist.   Neck:RIJ TDC  Lungs:clear to auscultation. Unlabored.    Heart:RRR no s3 or rub.  early syst m  Abdomen: + bs,soft, nontender   Extremities:1+ -2+ lower ext edema.    Neuro: speech more fluent . Moves all 4 ext.     Scheduled Meds:     aspirin, 81 mg, Oral, Daily  b complex-vitamin c-folic acid, 1 tablet, Oral, Daily  carvedilol, 25 mg, Oral, BID With Meals  cloNIDine, 0.3 mg, Oral, Q8H  gabapentin, 100 mg, Oral, Q24H  gabapentin, 300 mg, Oral, BID  hydrALAZINE, 100 mg, Oral, Q8H  insulin glargine, 10 Units, Subcutaneous, Q12H  insulin lispro, 0-14 Units, Subcutaneous, TID AC  levothyroxine, 200 mcg, Oral, Q AM  lidocaine, 1 patch, Transdermal, Q24H  losartan, 100 mg, Oral, Q24H  miconazole, 1 application, Topical, Q12H  pantoprazole, 40 mg, Oral, Q AM  predniSONE, 10 mg, Oral, Daily With Breakfast  [START ON 2022] predniSONE, 5 mg, Oral, Daily With Breakfast  [START ON 2022] predniSONE, 7.5 mg, Oral, Daily With Breakfast  rOPINIRole, 0.75 mg, Oral, Nightly  sertraline, 150 mg, Oral, Daily  sodium zirconium cyclosilicate, 5 g, Oral, Daily  spironolactone, 100 mg, Oral, Daily  tamsulosin, 0.4 mg, Oral, Daily      IV Meds:         Results Reviewed:   I have personally reviewed the results from the time of this admission to 7/22/2022 08:42 EDT     Results from last 7 days   Lab Units 07/22/22  0659 07/20/22  2015 07/18/22  1533 07/15/22  2207   SODIUM mmol/L 131* 134* 126* 134*   POTASSIUM mmol/L 5.0 4.0 5.3* 4.2   CHLORIDE mmol/L 95* 98 94* 98   CO2 mmol/L 25.0 28.0 20.0* 25.0   BUN mg/dL 27* 10 42* 15   CREATININE mg/dL 3.21* 1.47* 3.84* 2.03*   CALCIUM mg/dL 8.1* 8.6 7.7* 8.2*   BILIRUBIN mg/dL  --   --   --  0.5   ALK PHOS U/L  --   --   --  451*   ALT (SGPT) U/L  --   --   --  16   AST (SGOT) U/L  --   --   --  36*   GLUCOSE mg/dL 130* 82 161* 131*       Estimated Creatinine Clearance: 17.9 mL/min (A) (by C-G formula based on SCr of 3.21 mg/dL (H)).                Results from last 7 days   Lab Units 07/22/22  0659 07/20/22  2015 07/18/22  1533 07/15/22  2207   WBC 10*3/mm3 12.11* 10.15 11.46* 11.29*   HEMOGLOBIN g/dL 8.5* 9.2* 8.4* 8.5*   PLATELETS 10*3/mm3 314 272 239 187             Assessment / Plan     ASSESSMENT:  1.  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 HD MWF.  2.  Intracerebral bleed and altered mental status.  Rehab .   3.  Infected TDC, s/p removal 5/1. Replaced. Staph aureus. completed vancomycin.  4.  DM2    5.  Coronary artery disease  6.  Acute on chronic diastolic dysfunction . Volume off with HD.   7.  Anemia of CKD, on long-acting ANDRAE as an outpatient.    8. Hypothyroidism.  TSH still very high and Free t4 low .  Her medications were adjusted  9. 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 On ASA.     PLAN:    1.  HD again today.  Okay to be discharged to nursing home after dialysis  2.  Surveillance labs      Ruthann Palmer MD  07/22/22  08:42 EDT    [facility]  [phone]

## 2022-07-22 NOTE — PROGRESS NOTES
Inpatient Rehabilitation Plan of Care Note    Plan of Care  Care Plan Reviewed - No updates at this time.    Safety    Performed Intervention(s)  Fall precautions: bed low and locked, chair alarm and bed alarms in use, nonskid  socks and gait belt in use, call light and personal belongings within reach.  Hourly rounding.      Psychosocial    Performed Intervention(s)  Provide distraction and/or relaxation as needed.  Allow extra time for patient to verbalize needs, concerns, feelings, etc.      Body Systems    Performed Intervention(s)  Dialysis mwf  Monitor weight and I/O.  DM educator consult.  Accuchecks as ordered.      Sphincter Control    Performed Intervention(s)  Monitor I/O.  Bowel meds prn.      Pain    Performed Intervention(s)  Pain meds prn .  Ice to LLE per order      Body Function Structure    Performed Intervention(s)  Ointment per MD order    Signed by: Rachel Ponce RN

## 2022-07-22 NOTE — THERAPY DISCHARGE NOTE
Inpatient Rehabilitation - Physical Therapy Re-Assessment/Discharge  [facility]     Patient Name: Zaina Martinez  : 1965  MRN: [id number]  Today's Date: 2022                Admit Date: 2022    Visit Dx:    ICD-10-CM ICD-9-CM   1. Generalized weakness  R53.1 780.79   2. Diabetic muscle infarction (formerly [facility])  E11.69 250.80    M62.20 728.89   3. Other insomnia  G47.09 780.52     Patient Active Problem List   Diagnosis   • Renal insufficiency   • Hypertensive disorder   • Hypothyroidism   • Type 2 diabetes mellitus with kidney complication, with long-term current use of insulin (formerly [facility])   • Rheumatoid arthritis (formerly [facility])   • Angioedema   • Esophageal dysmotility   • Anemia   • Medically noncompliant   • Myocardial infarction due to demand ischemia (formerly [facility])   • Enteritis   • PRES (posterior reversible encephalopathy syndrome)   • Urine retention   • Klebsiella infection   • Superficial thrombophlebitis   • Generalized weakness   • ESRD (end stage renal disease) (formerly [facility])   • CAD (coronary artery disease)   • Abnormal urinalysis   • Chronic diastolic CHF (congestive heart failure) (formerly [facility])   • Pyelonephritis   • Calculus of gallbladder with acute on chronic cholecystitis without obstruction   • Pleural effusion on right   • Anemia due to chronic kidney disease, on chronic dialysis (formerly [facility])   • Abnormal findings on diagnostic imaging of other specified body structures   • Acute upper respiratory infection   • Agitation   • Alkaline phosphatase raised   • Casts present in urine   • Cellulitis of toe   • Hip pain   • Community acquired pneumonia   • Depressive disorder   • Diarrhea of presumed infectious origin   • Difficult or painful urination   • Disease due to severe acute respiratory syndrome coronavirus 2 (SARS-CoV-2)   • Dyspnea   • Encounter for follow-up examination after completed treatment for conditions other than malignant neoplasm   • H/O: hypothyroidism   • Hyperlipidemia   • Hypomagnesemia   • Intractable vomiting  with nausea   • Leukocytosis   • Luetscher's syndrome   • Need for influenza vaccination   • Restless legs   • Noncompliance with treatment   • Shoulder pain   • Urinary tract infectious disease   • Metabolic encephalopathy   • Abnormal findings on diagnostic imaging of abdomen   • Status post cholecystectomy   • Hyponatremia   • Leukocytosis   • Acute metabolic encephalopathy   • Encephalopathy, toxic   • Acute CVA (cerebrovascular accident) (HCC)   • Intracranial hemorrhage (HCC)   • Stroke (HCC)   • Abnormal urinalysis   • Diabetic muscle infarction (HCC)   • Other insomnia     Past Medical History:   Diagnosis Date   • Acute CVA (cerebrovascular accident) (HCC) 5/1/2022   • Acute on chronic diastolic CHF (congestive heart failure) (HCC)    • CAD (coronary artery disease) 12/20/2021   • Diabetes (HCC)    • Disease of thyroid gland    • GERD (gastroesophageal reflux disease)    • Hyperlipidemia 11/30/2018   • Hypertension    • Rheumatoid arthritis (HCC)      Past Surgical History:   Procedure Laterality Date   • CHOLECYSTECTOMY WITH INTRAOPERATIVE CHOLANGIOGRAM N/A 1/10/2022    Procedure: Laparoscopic cholecystectomy with intraoperative cholangiogram;  Surgeon: Ramana Raygoza MD;  Location: [facility];  Service: General;  Laterality: N/A;   • EYE SURGERY     • HYSTERECTOMY     • INSERTION HEMODIALYSIS CATHETER N/A 12/6/2021    Procedure: HEMODIALYSIS CATHETER INSERTION;  Surgeon: Keli Salazar MD;  Location: [facility] 18/19;  Service: Vascular;  Laterality: N/A;   • INSERTION HEMODIALYSIS CATHETER N/A 5/3/2022    Procedure: TUNNELED CATHETER PLACEMENT;  Surgeon: Keli Salazar MD;  Location: [facility];  Service: Vascular;  Laterality: N/A;   • MUSCLE BIOPSY Left 6/15/2022    Procedure: Left quadriceps muscle biopsy;  Surgeon: Marques Ma MD;  Location: [facility];  Service: Neurosurgery;  Laterality: Left;       PT ASSESSMENT (last 12 hours)     IRF PT Evaluation and  Treatment     Row Name 07/22/22 1125          PT Time and Intention    Document Type other (see comments)  Discharge summary  -LB     Mode of Treatment physical therapy  -LB     Row Name 07/22/22 1125          General Information    Existing Precautions/Restrictions fall;other (see comments)  contact precautions. No strenuous strengthening ex to Left quads or Left hamstrings.  -LB     Row Name 07/22/22 1125          Bed Mobility Goal 1 (PT-IRF)    Activity/Assistive Device (Bed Mobility Goal 1, PT-IRF) bed mobility activities, all  -LB     [place] Level (Bed Mobility Goal 1, PT-IRF) contact guard required;standby assist  -LB     Progress/Outcomes (Bed Mobility Goal 1, PT-IRF) goal not met  not consistently met  -LB     Row Name 07/22/22 1125          Transfer Goal 1 (PT-IRF)    Activity/Assistive Device (Transfer Goal 1, PT-IRF) bed-to-chair/chair-to-bed  -LB     [place] Level (Transfer Goal 1, PT-IRF) contact guard required  -LB     Progress/Outcomes (Transfer Goal 1, PT-IRF) goal not met  not consistently met  -LB     Row Name 07/22/22 1125          Transfer Goal 2 (PT-IRF)    Activity/Assistive Device (Transfer Goal 2, PT-IRF) car transfer  -LB     [place] Level (Transfer Goal 2, PT-IRF) minimum assist (75% or more patient effort)  -LB     Progress/Outcomes (Transfer Goal 2, PT-IRF) goal met  -LB     Row Name 07/22/22 1125          Gait/Walking Locomotion Goal 1 (PT-IRF)    Activity/Assistive Device (Gait/Walking Locomotion Goal 1, PT-IRF) gait (walking locomotion);walker, rolling  -LB     Gait/Walking Locomotion Distance Goal 1 (PT-IRF) 160'  -LB     [place] Level (Gait/Walking Locomotion Goal 1, PT-IRF) contact guard required  -LB     Progress/Outcomes (Gait/Walking Locomotion Goal 1, PT-IRF) goal not met  -LB     Row Name 07/22/22 1125          Stairs Goal 1 (PT-IRF)    Activity/Assistive Device (Stairs Goal 1, PT-IRF) ascending stairs;descending stairs;using handrail, right  -LB     Number  of Stairs (Stairs Goal 1, PT-IRF) 4  -LB     [place] Level (Stairs Goal 1, PT-IRF) minimum assist (75% or more patient effort)  -LB     Progress/Outcomes (Stairs Goal 1, PT-IRF) goal not met  not consistently met  -LB           User Key  (r) = Recorded By, (t) = Taken By, (c) = Cosigned By    Initials Name Provider Type    LB Liz Rodriguez PTA Physical Therapist Assistant                Physical Therapy Education                 Title: PT OT SLP Therapies (Resolved)     Topic: Physical Therapy (Resolved)     Point: Mobility training (Resolved)     Learning Progress Summary           Patient Acceptance, E, VU,NR by LB at 7/21/2022 1243   Family Acceptance, E,D, VU,NR by LB at 7/13/2022 1456    Comment: Stairs, (dgt practiced). 2 Helpers for stairs used.  Attempted car tsf to dgts car. Unable to perform as the SUV was too high for pnt. A platform step was offered for practice, but pnt declined.  Pnt refused amb during fam teaching, 2ary to L thigh pain.   Significant Other Acceptance, E, VU by LB at 7/15/2022 1556    Comment: car tsf      Show all documentation for this point (43)                 Point: Home exercise program (Resolved)     Learning Progress Summary           Patient Acceptance, E,TB, NR by MS at 7/11/2022 1434      Show all documentation for this point (13)                 Point: Body mechanics (Resolved)     Learning Progress Summary           Patient Acceptance, E,D, VU,DU by DP at 7/18/2022 1550   Family Acceptance, E, VU by LB at 7/13/2022 1504      Show all documentation for this point (9)                 Point: Precautions (Resolved)     Learning Progress Summary           Patient Acceptance, E,D, VU,DU by DP at 7/18/2022 1550      Show all documentation for this point (9)                             User Key     Initials Effective Dates Name Provider Type Discipline    LB 03/07/18 -  Liz Rodriguez PTA Physical Therapist Assistant PT    MS 06/16/21 -  Teagan Santos PT  Physical Therapist PT    DP 08/24/21 -  Jay Jim PT Physical Therapist PT                PT Recommendation and Plan                  Time Calculation:       Therapy Charges for Today     Code Description Service Date Service Provider Modifiers Qty    [id number] HC PT THER PROC EA 15 MIN 7/21/2022 Liz Rodriguez, GARY GP 6          PT G-Codes  AM-PAC 6 Clicks Score (PT): 17    PT Discharge Summary  Reason for Discharge: Discharge from facility  Outcomes Achieved: Patient able to partially acheive established goals (Has met most goals at some point during her stay, although not consistently. Functional mobiliy ability changed frequently, often due to pain.)  Discharge Destination: SNF    Liz Rodriguez PTA  7/22/2022

## 2022-07-25 DIAGNOSIS — M62.20: ICD-10-CM

## 2022-07-25 DIAGNOSIS — E11.69: ICD-10-CM

## 2022-07-25 RX ORDER — PREDNISONE 1 MG/1
TABLET ORAL
Qty: 35 TABLET | Refills: 1 | Status: SHIPPED | OUTPATIENT
Start: 2022-07-25 | End: 2022-10-17 | Stop reason: HOSPADM

## 2022-07-25 RX ORDER — GABAPENTIN 100 MG/1
CAPSULE ORAL
Qty: 30 CAPSULE | Refills: 1 | Status: SHIPPED | OUTPATIENT
Start: 2022-07-25 | End: 2022-10-06 | Stop reason: HOSPADM

## 2022-07-25 RX ORDER — LEVOTHYROXINE SODIUM 0.2 MG/1
200 TABLET ORAL
Qty: 30 TABLET | Refills: 0 | Status: SHIPPED | OUTPATIENT
Start: 2022-07-25 | End: 2022-09-13 | Stop reason: HOSPADM

## 2022-07-25 RX ORDER — HYDRALAZINE HYDROCHLORIDE 10 MG/1
10 TABLET, FILM COATED ORAL EVERY 8 HOURS PRN
Qty: 15 TABLET | Refills: 1 | Status: SHIPPED | OUTPATIENT
Start: 2022-07-25 | End: 2022-09-13 | Stop reason: HOSPADM

## 2022-07-25 RX ORDER — FOLIC ACID/VIT B COMPLEX AND C 0.8 MG
1 TABLET ORAL DAILY
Qty: 90 TABLET | Refills: 0 | Status: SHIPPED | OUTPATIENT
Start: 2022-07-25 | End: 2022-08-04 | Stop reason: HOSPADM

## 2022-07-25 RX ORDER — SPIRONOLACTONE 100 MG/1
100 TABLET, FILM COATED ORAL DAILY
Qty: 30 TABLET | Refills: 0 | Status: SHIPPED | OUTPATIENT
Start: 2022-07-25 | End: 2022-09-13 | Stop reason: HOSPADM

## 2022-07-25 RX ORDER — CLONIDINE HYDROCHLORIDE 0.3 MG/1
0.3 TABLET ORAL EVERY 8 HOURS SCHEDULED
Qty: 90 TABLET | Refills: 0 | Status: SHIPPED | OUTPATIENT
Start: 2022-07-25 | End: 2022-08-04 | Stop reason: HOSPADM

## 2022-07-25 NOTE — PROGRESS NOTES
PPS CMG Coordinator  Inpatient Rehabilitation Discharge    Mode of Locomotion: Walking.    Discharge Against Medical Advice:  No.  Discharge Information  Patient Discharged Alive:  Yes  Discharge Destination/Living Setting: Skilled Nursing Facility  Diagnosis for Interruption/Death: ICD    Impairment Group: Stroke: 01.1 Left Body Involvement (Right Brain)    Comorbidities: ICD    Complications: ICD    QUALITY INDICATORS  Section J Health Conditions: Fall(s) Since Admission:  No    Section M. Skin Conditions Discharge:  Unhealed Pressure Ulcer(s) at Stage 1 or  Higher:  No    . Current Number of Unhealed Pressure Ulcers  Branch    Section N. Medication:  Medication Intervention: N/A - There were no potential clinically significant  medication issues identified since admission or patient is not taking any  medications.    Signed by: Brisa Baires RN

## 2022-07-25 NOTE — PROGRESS NOTES
Had voicemail from patient's  left on Friday night, 7/22 , at 8:30 p.m. stating he had taken patient to [facility] and no one had seen her for the 2 hours she was there. He stated he was taking patient home.   Talked with  this morning, by phone. He stated staff at nursing home helped him get patient out of car and into room. After that, no one came to check on her and a resident across the cooper was hollering for help and no one came to assist them. He stated he did not feel patient was going to be taken care of so he decided to take patient home. He stated staff asked if they were leaving and he told them yes and they opened door for them to leave. He reported they did not inquire why they were leaving. He stated he got a call from a nurse there at 12:35 a.m. wanting to check on her. He stated he told nurse what he thought about the facility and the lack of attention they got while there.  reports patient is very happy to be home and using walker with help to get short distance to bathroom. He has installed grab bars by [employer] to help assist her on/off commode and he built ramp yesterday.He feels she is doing okay and he was happy also that she was home. Discussed what medicine they had at home. Since d/c of patient had been attempted and failed back on 7/1 and prescriptions had been filled at that time, they had those medications to give her. Daughter was in charge of setting up medications so called daughter to see what medications patient was missing. Reviewed medications with daughter along with her follow up appointments and those that needed still to be made. Daughter will call to schedule needed appointment with PCP and others. Contacted Dr. Ocampo to let him know of situation and medications needed. He e-scribed needed medications to her [facility] pharmacy on [address]. Contacted liaison with [facility] as they had also been following. They will use orders from 7/1 and contact  patient/family to schedule visits. Contacted patient's dialysis center, [facility] on [address], to let them know patient would be returning this afternoon to her regular schedule. Faxed d/c summary and dialysis sheets from 7/21 and 7/22 treatments. Sent email to Moreno, liaison with [facility], to let her know of situation. She did speak with  and said they were investigating what happened but was told patient came during shift change and left when they were going into room to do their assessment. Will give  name of  as he asked to speak with him.

## 2022-07-26 ENCOUNTER — TELEPHONE (OUTPATIENT)
Dept: NEUROLOGY | Facility: CLINIC | Age: 57
End: 2022-07-26

## 2022-07-26 NOTE — TELEPHONE ENCOUNTER
Patient is scheduled to see Dr. Tovar on 8/31. [facility] ordered a MRI Brain w & w/o, I'm assuming this needs to be done prior to appointment with La. [facility] scheduling called to get patient scheduled. I received a message, stating patients spouse doesn't know why this test is ordered.

## 2022-07-27 ENCOUNTER — APPOINTMENT (OUTPATIENT)
Dept: CT IMAGING | Facility: HOSPITAL | Age: 57
End: 2022-07-27

## 2022-07-27 ENCOUNTER — HOSPITAL ENCOUNTER (OUTPATIENT)
Facility: HOSPITAL | Age: 57
Setting detail: OBSERVATION
LOS: 2 days | Discharge: HOME HEALTH | End: 2022-08-04
Attending: EMERGENCY MEDICINE | Admitting: STUDENT IN AN ORGANIZED HEALTH CARE EDUCATION/TRAINING PROGRAM

## 2022-07-27 ENCOUNTER — APPOINTMENT (OUTPATIENT)
Dept: GENERAL RADIOLOGY | Facility: HOSPITAL | Age: 57
End: 2022-07-27

## 2022-07-27 DIAGNOSIS — D64.9 CHRONIC ANEMIA: ICD-10-CM

## 2022-07-27 DIAGNOSIS — R41.82 ALTERED MENTAL STATUS, UNSPECIFIED ALTERED MENTAL STATUS TYPE: ICD-10-CM

## 2022-07-27 DIAGNOSIS — R73.9 HYPERGLYCEMIA: ICD-10-CM

## 2022-07-27 DIAGNOSIS — J18.9 PNEUMONIA OF LEFT LUNG DUE TO INFECTIOUS ORGANISM, UNSPECIFIED PART OF LUNG: ICD-10-CM

## 2022-07-27 DIAGNOSIS — N18.6 ESRD (END STAGE RENAL DISEASE): Primary | ICD-10-CM

## 2022-07-27 DIAGNOSIS — E87.1 HYPONATREMIA: ICD-10-CM

## 2022-07-27 PROBLEM — Z86.73 HISTORY OF STROKE: Chronic | Status: ACTIVE | Noted: 2022-01-01

## 2022-07-27 PROBLEM — R00.1 BRADYCARDIA: Status: ACTIVE | Noted: 2022-01-01

## 2022-07-27 PROBLEM — R01.1 HEART MURMUR: Status: ACTIVE | Noted: 2022-01-01

## 2022-07-27 LAB
ALBUMIN SERPL-MCNC: 3.1 G/DL (ref 3.5–5.2)
ALBUMIN/GLOB SERPL: 1 G/DL
ALP SERPL-CCNC: 283 U/L (ref 39–117)
ALT SERPL W P-5'-P-CCNC: 13 U/L (ref 1–33)
ANION GAP SERPL CALCULATED.3IONS-SCNC: 16.6 MMOL/L (ref 5–15)
ARTERIAL PATENCY WRIST A: POSITIVE
AST SERPL-CCNC: 22 U/L (ref 1–32)
ATMOSPHERIC PRESS: 752.6 MMHG
BASE EXCESS BLDA CALC-SCNC: -1.3 MMOL/L (ref 0–2)
BASOPHILS # BLD AUTO: 0.04 10*3/MM3 (ref 0–0.2)
BASOPHILS NFR BLD AUTO: 0.2 % (ref 0–1.5)
BDY SITE: ABNORMAL
BILIRUB SERPL-MCNC: 0.5 MG/DL (ref 0–1.2)
BUN SERPL-MCNC: 43 MG/DL (ref 6–20)
BUN/CREAT SERPL: 9.9 (ref 7–25)
CALCIUM SPEC-SCNC: 8.1 MG/DL (ref 8.6–10.5)
CHLORIDE SERPL-SCNC: 90 MMOL/L (ref 98–107)
CO2 SERPL-SCNC: 21.4 MMOL/L (ref 22–29)
CREAT SERPL-MCNC: 4.33 MG/DL (ref 0.57–1)
D-LACTATE SERPL-SCNC: 1 MMOL/L (ref 0.5–2)
DEPRECATED RDW RBC AUTO: 47.8 FL (ref 37–54)
EGFRCR SERPLBLD CKD-EPI 2021: 11.4 ML/MIN/1.73
EOSINOPHIL # BLD AUTO: 0.01 10*3/MM3 (ref 0–0.4)
EOSINOPHIL NFR BLD AUTO: 0.1 % (ref 0.3–6.2)
ERYTHROCYTE [DISTWIDTH] IN BLOOD BY AUTOMATED COUNT: 16.1 % (ref 12.3–15.4)
ETHANOL BLD-MCNC: <10 MG/DL (ref 0–10)
ETHANOL UR QL: <0.01 %
GAS FLOW AIRWAY: 2 LPM
GLOBULIN UR ELPH-MCNC: 3.2 GM/DL
GLUCOSE BLDC GLUCOMTR-MCNC: 240 MG/DL (ref 70–130)
GLUCOSE BLDC GLUCOMTR-MCNC: 414 MG/DL (ref 70–130)
GLUCOSE BLDC GLUCOMTR-MCNC: 440 MG/DL (ref 70–130)
GLUCOSE SERPL-MCNC: 459 MG/DL (ref 65–99)
HCO3 BLDA-SCNC: 23.7 MMOL/L (ref 22–28)
HCT VFR BLD AUTO: 27.5 % (ref 34–46.6)
HGB BLD-MCNC: 8.2 G/DL (ref 12–15.9)
HOLD SPECIMEN: NORMAL
HOLD SPECIMEN: NORMAL
IMM GRANULOCYTES # BLD AUTO: 0.16 10*3/MM3 (ref 0–0.05)
IMM GRANULOCYTES NFR BLD AUTO: 0.9 % (ref 0–0.5)
LYMPHOCYTES # BLD AUTO: 1.18 10*3/MM3 (ref 0.7–3.1)
LYMPHOCYTES NFR BLD AUTO: 6.5 % (ref 19.6–45.3)
MCH RBC QN AUTO: 24.7 PG (ref 26.6–33)
MCHC RBC AUTO-ENTMCNC: 29.8 G/DL (ref 31.5–35.7)
MCV RBC AUTO: 82.8 FL (ref 79–97)
MODALITY: ABNORMAL
MONOCYTES # BLD AUTO: 1.1 10*3/MM3 (ref 0.1–0.9)
MONOCYTES NFR BLD AUTO: 6.1 % (ref 5–12)
NEUTROPHILS NFR BLD AUTO: 15.53 10*3/MM3 (ref 1.7–7)
NEUTROPHILS NFR BLD AUTO: 86.2 % (ref 42.7–76)
NRBC BLD AUTO-RTO: 0.1 /100 WBC (ref 0–0.2)
PCO2 BLDA: 40.2 MM HG (ref 35–45)
PH BLDA: 7.38 PH UNITS (ref 7.35–7.45)
PLATELET # BLD AUTO: 299 10*3/MM3 (ref 140–450)
PMV BLD AUTO: 11.6 FL (ref 6–12)
PO2 BLDA: 72 MM HG (ref 80–100)
POTASSIUM SERPL-SCNC: 5 MMOL/L (ref 3.5–5.2)
PROCALCITONIN SERPL-MCNC: 0.81 NG/ML (ref 0–0.25)
PROT SERPL-MCNC: 6.3 G/DL (ref 6–8.5)
QT INTERVAL: 461 MS
RBC # BLD AUTO: 3.32 10*6/MM3 (ref 3.77–5.28)
SAO2 % BLDCOA: 93.9 % (ref 92–99)
SARS-COV-2 ORF1AB RESP QL NAA+PROBE: NOT DETECTED
SODIUM SERPL-SCNC: 128 MMOL/L (ref 136–145)
TOTAL RATE: 16 BREATHS/MINUTE
TROPONIN T SERPL-MCNC: 0.23 NG/ML (ref 0–0.03)
WBC NRBC COR # BLD: 18.02 10*3/MM3 (ref 3.4–10.8)
WHOLE BLOOD HOLD COAG: NORMAL
WHOLE BLOOD HOLD SPECIMEN: NORMAL

## 2022-07-27 PROCEDURE — G0378 HOSPITAL OBSERVATION PER HR: HCPCS

## 2022-07-27 PROCEDURE — 70450 CT HEAD/BRAIN W/O DYE: CPT

## 2022-07-27 PROCEDURE — 82803 BLOOD GASES ANY COMBINATION: CPT

## 2022-07-27 PROCEDURE — 87040 BLOOD CULTURE FOR BACTERIA: CPT | Performed by: EMERGENCY MEDICINE

## 2022-07-27 PROCEDURE — 96365 THER/PROPH/DIAG IV INF INIT: CPT

## 2022-07-27 PROCEDURE — 93010 ELECTROCARDIOGRAM REPORT: CPT | Performed by: INTERNAL MEDICINE

## 2022-07-27 PROCEDURE — 82962 GLUCOSE BLOOD TEST: CPT

## 2022-07-27 PROCEDURE — 85025 COMPLETE CBC W/AUTO DIFF WBC: CPT | Performed by: EMERGENCY MEDICINE

## 2022-07-27 PROCEDURE — 82077 ASSAY SPEC XCP UR&BREATH IA: CPT | Performed by: EMERGENCY MEDICINE

## 2022-07-27 PROCEDURE — 96366 THER/PROPH/DIAG IV INF ADDON: CPT

## 2022-07-27 PROCEDURE — 80053 COMPREHEN METABOLIC PANEL: CPT | Performed by: EMERGENCY MEDICINE

## 2022-07-27 PROCEDURE — C9803 HOPD COVID-19 SPEC COLLECT: HCPCS

## 2022-07-27 PROCEDURE — 99285 EMERGENCY DEPT VISIT HI MDM: CPT

## 2022-07-27 PROCEDURE — 84145 PROCALCITONIN (PCT): CPT | Performed by: EMERGENCY MEDICINE

## 2022-07-27 PROCEDURE — 96367 TX/PROPH/DG ADDL SEQ IV INF: CPT

## 2022-07-27 PROCEDURE — 63710000001 INSULIN REGULAR HUMAN PER 5 UNITS: Performed by: NURSE PRACTITIONER

## 2022-07-27 PROCEDURE — 25010000002 CEFEPIME PER 500 MG: Performed by: NURSE PRACTITIONER

## 2022-07-27 PROCEDURE — 25010000002 VANCOMYCIN 10 G RECONSTITUTED SOLUTION: Performed by: NURSE PRACTITIONER

## 2022-07-27 PROCEDURE — G0257 UNSCHED DIALYSIS ESRD PT HOS: HCPCS

## 2022-07-27 PROCEDURE — U0004 COV-19 TEST NON-CDC HGH THRU: HCPCS | Performed by: NURSE PRACTITIONER

## 2022-07-27 PROCEDURE — 36415 COLL VENOUS BLD VENIPUNCTURE: CPT

## 2022-07-27 PROCEDURE — 93005 ELECTROCARDIOGRAM TRACING: CPT | Performed by: NURSE PRACTITIONER

## 2022-07-27 PROCEDURE — 94640 AIRWAY INHALATION TREATMENT: CPT

## 2022-07-27 PROCEDURE — 71045 X-RAY EXAM CHEST 1 VIEW: CPT

## 2022-07-27 PROCEDURE — 84484 ASSAY OF TROPONIN QUANT: CPT | Performed by: NURSE PRACTITIONER

## 2022-07-27 PROCEDURE — 36600 WITHDRAWAL OF ARTERIAL BLOOD: CPT

## 2022-07-27 PROCEDURE — 83605 ASSAY OF LACTIC ACID: CPT | Performed by: EMERGENCY MEDICINE

## 2022-07-27 PROCEDURE — 94799 UNLISTED PULMONARY SVC/PX: CPT

## 2022-07-27 RX ORDER — CALCIUM CARBONATE 200(500)MG
2 TABLET,CHEWABLE ORAL 2 TIMES DAILY PRN
Status: DISCONTINUED | OUTPATIENT
Start: 2022-07-27 | End: 2022-08-04 | Stop reason: HOSPADM

## 2022-07-27 RX ORDER — GABAPENTIN 100 MG/1
100 CAPSULE ORAL
Status: DISCONTINUED | OUTPATIENT
Start: 2022-07-28 | End: 2022-08-04 | Stop reason: HOSPADM

## 2022-07-27 RX ORDER — UREA 10 %
1 LOTION (ML) TOPICAL NIGHTLY PRN
Status: DISCONTINUED | OUTPATIENT
Start: 2022-07-27 | End: 2022-07-28

## 2022-07-27 RX ORDER — BISACODYL 5 MG/1
5 TABLET, DELAYED RELEASE ORAL DAILY PRN
Status: DISCONTINUED | OUTPATIENT
Start: 2022-07-27 | End: 2022-08-04 | Stop reason: HOSPADM

## 2022-07-27 RX ORDER — HYDRALAZINE HYDROCHLORIDE 10 MG/1
10 TABLET, FILM COATED ORAL EVERY 8 HOURS PRN
Status: DISCONTINUED | OUTPATIENT
Start: 2022-07-27 | End: 2022-08-04 | Stop reason: HOSPADM

## 2022-07-27 RX ORDER — ACETAMINOPHEN 160 MG/5ML
650 SOLUTION ORAL EVERY 4 HOURS PRN
Status: DISCONTINUED | OUTPATIENT
Start: 2022-07-27 | End: 2022-08-04 | Stop reason: HOSPADM

## 2022-07-27 RX ORDER — LOSARTAN POTASSIUM 100 MG/1
100 TABLET ORAL
Status: DISCONTINUED | OUTPATIENT
Start: 2022-07-28 | End: 2022-08-04 | Stop reason: HOSPADM

## 2022-07-27 RX ORDER — SPIRONOLACTONE 100 MG/1
100 TABLET, FILM COATED ORAL DAILY
Status: DISCONTINUED | OUTPATIENT
Start: 2022-07-28 | End: 2022-07-30

## 2022-07-27 RX ORDER — PREDNISONE 10 MG/1
5 TABLET ORAL SEE ADMIN INSTRUCTIONS
Status: DISCONTINUED | OUTPATIENT
Start: 2022-07-27 | End: 2022-07-27

## 2022-07-27 RX ORDER — ONDANSETRON 2 MG/ML
4 INJECTION INTRAMUSCULAR; INTRAVENOUS EVERY 6 HOURS PRN
Status: DISCONTINUED | OUTPATIENT
Start: 2022-07-27 | End: 2022-08-04 | Stop reason: HOSPADM

## 2022-07-27 RX ORDER — ALBUTEROL SULFATE 2.5 MG/3ML
2.5 SOLUTION RESPIRATORY (INHALATION)
Status: DISCONTINUED | OUTPATIENT
Start: 2022-07-27 | End: 2022-08-04 | Stop reason: HOSPADM

## 2022-07-27 RX ORDER — CLONIDINE HYDROCHLORIDE 0.1 MG/1
0.3 TABLET ORAL EVERY 8 HOURS SCHEDULED
Status: DISCONTINUED | OUTPATIENT
Start: 2022-07-27 | End: 2022-07-31

## 2022-07-27 RX ORDER — ALBUTEROL SULFATE 2.5 MG/3ML
2.5 SOLUTION RESPIRATORY (INHALATION) EVERY 6 HOURS PRN
Status: DISCONTINUED | OUTPATIENT
Start: 2022-07-27 | End: 2022-08-04 | Stop reason: HOSPADM

## 2022-07-27 RX ORDER — PREDNISONE 10 MG/1
5 TABLET ORAL
Status: DISCONTINUED | OUTPATIENT
Start: 2022-08-05 | End: 2022-08-04 | Stop reason: HOSPADM

## 2022-07-27 RX ORDER — ACETAMINOPHEN 325 MG/1
650 TABLET ORAL EVERY 4 HOURS PRN
Status: DISCONTINUED | OUTPATIENT
Start: 2022-07-27 | End: 2022-08-04 | Stop reason: HOSPADM

## 2022-07-27 RX ORDER — LIDOCAINE 50 MG/G
1 PATCH TOPICAL
Status: DISCONTINUED | OUTPATIENT
Start: 2022-07-27 | End: 2022-08-04 | Stop reason: HOSPADM

## 2022-07-27 RX ORDER — BISACODYL 10 MG
10 SUPPOSITORY, RECTAL RECTAL DAILY PRN
Status: DISCONTINUED | OUTPATIENT
Start: 2022-07-27 | End: 2022-08-04 | Stop reason: HOSPADM

## 2022-07-27 RX ORDER — HYDRALAZINE HYDROCHLORIDE 50 MG/1
50 TABLET, FILM COATED ORAL EVERY 8 HOURS SCHEDULED
Status: DISCONTINUED | OUTPATIENT
Start: 2022-07-27 | End: 2022-07-29

## 2022-07-27 RX ORDER — INSULIN LISPRO 100 [IU]/ML
0-14 INJECTION, SOLUTION INTRAVENOUS; SUBCUTANEOUS
Status: DISCONTINUED | OUTPATIENT
Start: 2022-07-27 | End: 2022-08-04 | Stop reason: HOSPADM

## 2022-07-27 RX ORDER — ACETAMINOPHEN 650 MG/1
650 SUPPOSITORY RECTAL EVERY 4 HOURS PRN
Status: DISCONTINUED | OUTPATIENT
Start: 2022-07-27 | End: 2022-08-04 | Stop reason: HOSPADM

## 2022-07-27 RX ORDER — ROPINIROLE 0.5 MG/1
0.75 TABLET, FILM COATED ORAL NIGHTLY
Status: DISCONTINUED | OUTPATIENT
Start: 2022-07-28 | End: 2022-08-04 | Stop reason: HOSPADM

## 2022-07-27 RX ORDER — GABAPENTIN 100 MG/1
100 CAPSULE ORAL 2 TIMES DAILY PRN
Status: DISCONTINUED | OUTPATIENT
Start: 2022-07-27 | End: 2022-08-04 | Stop reason: HOSPADM

## 2022-07-27 RX ORDER — LEVOTHYROXINE SODIUM 0.1 MG/1
200 TABLET ORAL
Status: DISCONTINUED | OUTPATIENT
Start: 2022-07-28 | End: 2022-08-04 | Stop reason: HOSPADM

## 2022-07-27 RX ORDER — FOLIC ACID 1 MG/1
1 TABLET ORAL DAILY
Status: DISCONTINUED | OUTPATIENT
Start: 2022-07-28 | End: 2022-08-04 | Stop reason: HOSPADM

## 2022-07-27 RX ORDER — AMOXICILLIN 250 MG
2 CAPSULE ORAL 2 TIMES DAILY
Status: DISCONTINUED | OUTPATIENT
Start: 2022-07-27 | End: 2022-08-04 | Stop reason: HOSPADM

## 2022-07-27 RX ORDER — POLYETHYLENE GLYCOL 3350 17 G/17G
17 POWDER, FOR SOLUTION ORAL DAILY PRN
Status: DISCONTINUED | OUTPATIENT
Start: 2022-07-27 | End: 2022-08-04 | Stop reason: HOSPADM

## 2022-07-27 RX ORDER — PREDNISONE 10 MG/1
10 TABLET ORAL
Status: COMPLETED | OUTPATIENT
Start: 2022-07-28 | End: 2022-07-28

## 2022-07-27 RX ORDER — DEXTROSE MONOHYDRATE 25 G/50ML
25 INJECTION, SOLUTION INTRAVENOUS
Status: DISCONTINUED | OUTPATIENT
Start: 2022-07-27 | End: 2022-08-04 | Stop reason: HOSPADM

## 2022-07-27 RX ORDER — ONDANSETRON 4 MG/1
4 TABLET, FILM COATED ORAL EVERY 6 HOURS PRN
Status: DISCONTINUED | OUTPATIENT
Start: 2022-07-27 | End: 2022-08-04 | Stop reason: HOSPADM

## 2022-07-27 RX ORDER — PANTOPRAZOLE SODIUM 40 MG/1
40 TABLET, DELAYED RELEASE ORAL DAILY
Status: DISCONTINUED | OUTPATIENT
Start: 2022-07-27 | End: 2022-08-04 | Stop reason: HOSPADM

## 2022-07-27 RX ORDER — TAMSULOSIN HYDROCHLORIDE 0.4 MG/1
0.4 CAPSULE ORAL DAILY
Status: DISCONTINUED | OUTPATIENT
Start: 2022-07-28 | End: 2022-08-04 | Stop reason: HOSPADM

## 2022-07-27 RX ORDER — SODIUM CHLORIDE 0.9 % (FLUSH) 0.9 %
10 SYRINGE (ML) INJECTION EVERY 12 HOURS SCHEDULED
Status: DISCONTINUED | OUTPATIENT
Start: 2022-07-27 | End: 2022-08-04 | Stop reason: HOSPADM

## 2022-07-27 RX ORDER — ASPIRIN 81 MG/1
81 TABLET ORAL DAILY
Status: DISCONTINUED | OUTPATIENT
Start: 2022-07-27 | End: 2022-08-04 | Stop reason: HOSPADM

## 2022-07-27 RX ORDER — NICOTINE POLACRILEX 4 MG
15 LOZENGE BUCCAL
Status: DISCONTINUED | OUTPATIENT
Start: 2022-07-27 | End: 2022-08-04 | Stop reason: HOSPADM

## 2022-07-27 RX ADMIN — ALBUTEROL SULFATE 2.5 MG: 2.5 SOLUTION RESPIRATORY (INHALATION) at 19:59

## 2022-07-27 RX ADMIN — ACETAMINOPHEN 650 MG: 325 TABLET, FILM COATED ORAL at 23:37

## 2022-07-27 RX ADMIN — VANCOMYCIN HYDROCHLORIDE 1250 MG: 10 INJECTION, POWDER, LYOPHILIZED, FOR SOLUTION INTRAVENOUS at 16:07

## 2022-07-27 RX ADMIN — INSULIN HUMAN 10 UNITS: 100 INJECTION, SOLUTION PARENTERAL at 14:55

## 2022-07-27 RX ADMIN — SODIUM CHLORIDE 500 ML: 9 INJECTION, SOLUTION INTRAVENOUS at 12:51

## 2022-07-27 RX ADMIN — GABAPENTIN 100 MG: 100 CAPSULE ORAL at 23:38

## 2022-07-27 RX ADMIN — CEFEPIME 2 G: 2 INJECTION, POWDER, FOR SOLUTION INTRAVENOUS at 14:53

## 2022-07-27 NOTE — TELEPHONE ENCOUNTER
Spoke with patient's daughter, Fiona to explain reasoning for repeat imaging.  Also gave the phone number for scheduling at [facility].

## 2022-07-28 LAB
ALBUMIN SERPL-MCNC: 3 G/DL (ref 3.5–5.2)
ALBUMIN/GLOB SERPL: 1.2 G/DL
ALP SERPL-CCNC: 236 U/L (ref 39–117)
ALT SERPL W P-5'-P-CCNC: 15 U/L (ref 1–33)
ANION GAP SERPL CALCULATED.3IONS-SCNC: 11.5 MMOL/L (ref 5–15)
AST SERPL-CCNC: 27 U/L (ref 1–32)
BASOPHILS # BLD AUTO: 0.05 10*3/MM3 (ref 0–0.2)
BASOPHILS NFR BLD AUTO: 0.3 % (ref 0–1.5)
BILIRUB SERPL-MCNC: 0.6 MG/DL (ref 0–1.2)
BUN SERPL-MCNC: 18 MG/DL (ref 6–20)
BUN/CREAT SERPL: 7.6 (ref 7–25)
CALCIUM SPEC-SCNC: 7.9 MG/DL (ref 8.6–10.5)
CHLORIDE SERPL-SCNC: 98 MMOL/L (ref 98–107)
CO2 SERPL-SCNC: 27.5 MMOL/L (ref 22–29)
CREAT SERPL-MCNC: 2.36 MG/DL (ref 0.57–1)
DEPRECATED RDW RBC AUTO: 46.1 FL (ref 37–54)
EGFRCR SERPLBLD CKD-EPI 2021: 23.6 ML/MIN/1.73
EOSINOPHIL # BLD AUTO: 0.1 10*3/MM3 (ref 0–0.4)
EOSINOPHIL NFR BLD AUTO: 0.6 % (ref 0.3–6.2)
ERYTHROCYTE [DISTWIDTH] IN BLOOD BY AUTOMATED COUNT: 16.5 % (ref 12.3–15.4)
GLOBULIN UR ELPH-MCNC: 2.6 GM/DL
GLUCOSE BLDC GLUCOMTR-MCNC: 106 MG/DL (ref 70–130)
GLUCOSE BLDC GLUCOMTR-MCNC: 163 MG/DL (ref 70–130)
GLUCOSE BLDC GLUCOMTR-MCNC: 187 MG/DL (ref 70–130)
GLUCOSE BLDC GLUCOMTR-MCNC: 193 MG/DL (ref 70–130)
GLUCOSE BLDC GLUCOMTR-MCNC: 244 MG/DL (ref 70–130)
GLUCOSE BLDC GLUCOMTR-MCNC: 60 MG/DL (ref 70–130)
GLUCOSE SERPL-MCNC: 113 MG/DL (ref 65–99)
HCT VFR BLD AUTO: 25 % (ref 34–46.6)
HGB BLD-MCNC: 7.9 G/DL (ref 12–15.9)
IMM GRANULOCYTES # BLD AUTO: 0.15 10*3/MM3 (ref 0–0.05)
IMM GRANULOCYTES NFR BLD AUTO: 0.9 % (ref 0–0.5)
LYMPHOCYTES # BLD AUTO: 1.1 10*3/MM3 (ref 0.7–3.1)
LYMPHOCYTES NFR BLD AUTO: 6.8 % (ref 19.6–45.3)
MCH RBC QN AUTO: 24.8 PG (ref 26.6–33)
MCHC RBC AUTO-ENTMCNC: 31.6 G/DL (ref 31.5–35.7)
MCV RBC AUTO: 78.6 FL (ref 79–97)
MONOCYTES # BLD AUTO: 1.14 10*3/MM3 (ref 0.1–0.9)
MONOCYTES NFR BLD AUTO: 7 % (ref 5–12)
MRSA DNA SPEC QL NAA+PROBE: ABNORMAL
NEUTROPHILS NFR BLD AUTO: 13.64 10*3/MM3 (ref 1.7–7)
NEUTROPHILS NFR BLD AUTO: 84.4 % (ref 42.7–76)
NRBC BLD AUTO-RTO: 0 /100 WBC (ref 0–0.2)
PLATELET # BLD AUTO: 297 10*3/MM3 (ref 140–450)
PMV BLD AUTO: 10.8 FL (ref 6–12)
POTASSIUM SERPL-SCNC: 3.5 MMOL/L (ref 3.5–5.2)
PROT SERPL-MCNC: 5.6 G/DL (ref 6–8.5)
RBC # BLD AUTO: 3.18 10*6/MM3 (ref 3.77–5.28)
SODIUM SERPL-SCNC: 137 MMOL/L (ref 136–145)
TROPONIN T SERPL-MCNC: 0.26 NG/ML (ref 0–0.03)
VANCOMYCIN SERPL-MCNC: 15.3 MCG/ML (ref 5–40)
WBC NRBC COR # BLD: 16.18 10*3/MM3 (ref 3.4–10.8)

## 2022-07-28 PROCEDURE — 36415 COLL VENOUS BLD VENIPUNCTURE: CPT | Performed by: INTERNAL MEDICINE

## 2022-07-28 PROCEDURE — 82962 GLUCOSE BLOOD TEST: CPT

## 2022-07-28 PROCEDURE — 25010000002 VANCOMYCIN 750 MG RECONSTITUTED SOLUTION: Performed by: INTERNAL MEDICINE

## 2022-07-28 PROCEDURE — 97166 OT EVAL MOD COMPLEX 45 MIN: CPT

## 2022-07-28 PROCEDURE — 63710000001 INSULIN LISPRO (HUMAN) PER 5 UNITS: Performed by: INTERNAL MEDICINE

## 2022-07-28 PROCEDURE — G0378 HOSPITAL OBSERVATION PER HR: HCPCS

## 2022-07-28 PROCEDURE — 80053 COMPREHEN METABOLIC PANEL: CPT | Performed by: INTERNAL MEDICINE

## 2022-07-28 PROCEDURE — 87641 MR-STAPH DNA AMP PROBE: CPT | Performed by: INTERNAL MEDICINE

## 2022-07-28 PROCEDURE — 97162 PT EVAL MOD COMPLEX 30 MIN: CPT

## 2022-07-28 PROCEDURE — 94761 N-INVAS EAR/PLS OXIMETRY MLT: CPT

## 2022-07-28 PROCEDURE — 25010000002 CEFEPIME PER 500 MG: Performed by: INTERNAL MEDICINE

## 2022-07-28 PROCEDURE — 97530 THERAPEUTIC ACTIVITIES: CPT

## 2022-07-28 PROCEDURE — 96372 THER/PROPH/DIAG INJ SC/IM: CPT

## 2022-07-28 PROCEDURE — 94799 UNLISTED PULMONARY SVC/PX: CPT

## 2022-07-28 PROCEDURE — 85025 COMPLETE CBC W/AUTO DIFF WBC: CPT | Performed by: INTERNAL MEDICINE

## 2022-07-28 PROCEDURE — 84484 ASSAY OF TROPONIN QUANT: CPT | Performed by: INTERNAL MEDICINE

## 2022-07-28 PROCEDURE — 63710000001 PREDNISONE PER 5 MG: Performed by: INTERNAL MEDICINE

## 2022-07-28 PROCEDURE — 94664 DEMO&/EVAL PT USE INHALER: CPT

## 2022-07-28 PROCEDURE — 80202 ASSAY OF VANCOMYCIN: CPT | Performed by: INTERNAL MEDICINE

## 2022-07-28 PROCEDURE — 25010000002 EPOETIN ALFA-EPBX 10000 UNIT/ML SOLUTION: Performed by: INTERNAL MEDICINE

## 2022-07-28 RX ORDER — OXYCODONE HYDROCHLORIDE 5 MG/1
5 TABLET ORAL EVERY 4 HOURS PRN
Status: DISPENSED | OUTPATIENT
Start: 2022-07-28 | End: 2022-08-04

## 2022-07-28 RX ORDER — OXYCODONE HYDROCHLORIDE 5 MG/1
5 TABLET ORAL ONCE
Status: COMPLETED | OUTPATIENT
Start: 2022-07-28 | End: 2022-07-28

## 2022-07-28 RX ORDER — UREA 10 %
3 LOTION (ML) TOPICAL NIGHTLY PRN
Status: DISCONTINUED | OUTPATIENT
Start: 2022-07-28 | End: 2022-08-04 | Stop reason: HOSPADM

## 2022-07-28 RX ORDER — GABAPENTIN 100 MG/1
100 CAPSULE ORAL
Status: DISCONTINUED | OUTPATIENT
Start: 2022-07-28 | End: 2022-07-28

## 2022-07-28 RX ADMIN — CLONIDINE HYDROCHLORIDE 0.3 MG: 0.1 TABLET ORAL at 06:13

## 2022-07-28 RX ADMIN — INSULIN LISPRO 3 UNITS: 100 INJECTION, SOLUTION INTRAVENOUS; SUBCUTANEOUS at 12:38

## 2022-07-28 RX ADMIN — ALBUTEROL SULFATE 2.5 MG: 2.5 SOLUTION RESPIRATORY (INHALATION) at 19:58

## 2022-07-28 RX ADMIN — ASPIRIN 81 MG: 81 TABLET, COATED ORAL at 09:16

## 2022-07-28 RX ADMIN — SODIUM CHLORIDE 750 MG: 900 INJECTION, SOLUTION INTRAVENOUS at 09:25

## 2022-07-28 RX ADMIN — Medication 10 ML: at 03:13

## 2022-07-28 RX ADMIN — CEFEPIME 2 G: 2 INJECTION, POWDER, FOR SOLUTION INTRAVENOUS at 15:44

## 2022-07-28 RX ADMIN — SERTRALINE 150 MG: 100 TABLET, FILM COATED ORAL at 09:15

## 2022-07-28 RX ADMIN — Medication 3 MG: at 23:10

## 2022-07-28 RX ADMIN — DOCUSATE SODIUM 50MG AND SENNOSIDES 8.6MG 2 TABLET: 8.6; 5 TABLET, FILM COATED ORAL at 20:53

## 2022-07-28 RX ADMIN — FOLIC ACID 1 MG: 1 TABLET ORAL at 09:16

## 2022-07-28 RX ADMIN — LEVOTHYROXINE SODIUM 200 MCG: 0.1 TABLET ORAL at 06:13

## 2022-07-28 RX ADMIN — MICONAZOLE NITRATE 1 APPLICATION: 20 CREAM TOPICAL at 09:32

## 2022-07-28 RX ADMIN — LOSARTAN POTASSIUM 100 MG: 100 TABLET, FILM COATED ORAL at 09:17

## 2022-07-28 RX ADMIN — HYDRALAZINE HYDROCHLORIDE 50 MG: 50 TABLET, FILM COATED ORAL at 15:44

## 2022-07-28 RX ADMIN — HYDRALAZINE HYDROCHLORIDE 50 MG: 50 TABLET, FILM COATED ORAL at 20:52

## 2022-07-28 RX ADMIN — ROPINIROLE HYDROCHLORIDE 0.75 MG: 0.5 TABLET, FILM COATED ORAL at 20:53

## 2022-07-28 RX ADMIN — CLONIDINE HYDROCHLORIDE 0.3 MG: 0.1 TABLET ORAL at 15:44

## 2022-07-28 RX ADMIN — PANTOPRAZOLE SODIUM 40 MG: 40 TABLET, DELAYED RELEASE ORAL at 09:16

## 2022-07-28 RX ADMIN — OXYCODONE 5 MG: 5 TABLET ORAL at 04:13

## 2022-07-28 RX ADMIN — INSULIN LISPRO 3 UNITS: 100 INJECTION, SOLUTION INTRAVENOUS; SUBCUTANEOUS at 18:57

## 2022-07-28 RX ADMIN — ALBUTEROL SULFATE 2.5 MG: 2.5 SOLUTION RESPIRATORY (INHALATION) at 07:12

## 2022-07-28 RX ADMIN — Medication 10 ML: at 20:54

## 2022-07-28 RX ADMIN — INSULIN GLARGINE-YFGN 10 UNITS: 100 INJECTION, SOLUTION SUBCUTANEOUS at 22:14

## 2022-07-28 RX ADMIN — SPIRONOLACTONE 100 MG: 100 TABLET ORAL at 09:16

## 2022-07-28 RX ADMIN — Medication 10 ML: at 09:25

## 2022-07-28 RX ADMIN — ALBUTEROL SULFATE 2.5 MG: 2.5 SOLUTION RESPIRATORY (INHALATION) at 10:37

## 2022-07-28 RX ADMIN — INSULIN LISPRO 3 UNITS: 100 INJECTION, SOLUTION INTRAVENOUS; SUBCUTANEOUS at 09:15

## 2022-07-28 RX ADMIN — ALBUTEROL SULFATE 2.5 MG: 2.5 SOLUTION RESPIRATORY (INHALATION) at 14:53

## 2022-07-28 RX ADMIN — OXYCODONE 5 MG: 5 TABLET ORAL at 12:38

## 2022-07-28 RX ADMIN — CLONIDINE HYDROCHLORIDE 0.3 MG: 0.1 TABLET ORAL at 20:52

## 2022-07-28 RX ADMIN — HYDRALAZINE HYDROCHLORIDE 50 MG: 50 TABLET, FILM COATED ORAL at 06:12

## 2022-07-28 RX ADMIN — PREDNISONE 10 MG: 10 TABLET ORAL at 09:17

## 2022-07-28 RX ADMIN — INSULIN GLARGINE-YFGN 10 UNITS: 100 INJECTION, SOLUTION SUBCUTANEOUS at 09:25

## 2022-07-28 RX ADMIN — TAMSULOSIN HYDROCHLORIDE 0.4 MG: 0.4 CAPSULE ORAL at 09:16

## 2022-07-28 RX ADMIN — EPOETIN ALFA-EPBX 10000 UNITS: 10000 INJECTION, SOLUTION INTRAVENOUS; SUBCUTANEOUS at 06:14

## 2022-07-29 ENCOUNTER — APPOINTMENT (OUTPATIENT)
Dept: CARDIOLOGY | Facility: HOSPITAL | Age: 57
End: 2022-07-29

## 2022-07-29 PROBLEM — G93.41 METABOLIC ENCEPHALOPATHY: Status: ACTIVE | Noted: 2022-01-01

## 2022-07-29 LAB
AORTIC DIMENSIONLESS INDEX: 0.7 (DI)
BH CV ECHO LEFT VENTRICLE GLOBAL LONGITUDINAL STRAIN: -20.1 %
BH CV ECHO MEAS - ACS: 1.79 CM
BH CV ECHO MEAS - AO MAX PG: 14.7 MMHG
BH CV ECHO MEAS - AO MEAN PG: 8 MMHG
BH CV ECHO MEAS - AO V2 MAX: 192 CM/SEC
BH CV ECHO MEAS - AO V2 VTI: 47.5 CM
BH CV ECHO MEAS - AVA(I,D): 2.1 CM2
BH CV ECHO MEAS - EDV(CUBED): 97.1 ML
BH CV ECHO MEAS - EDV(MOD-SP2): 100 ML
BH CV ECHO MEAS - EDV(MOD-SP4): 96 ML
BH CV ECHO MEAS - EF(MOD-BP): 65 %
BH CV ECHO MEAS - EF(MOD-SP2): 65 %
BH CV ECHO MEAS - EF(MOD-SP4): 64.6 %
BH CV ECHO MEAS - EF_3D-VOL: 64 %
BH CV ECHO MEAS - ESV(CUBED): 18.6 ML
BH CV ECHO MEAS - ESV(MOD-SP2): 35 ML
BH CV ECHO MEAS - ESV(MOD-SP4): 34 ML
BH CV ECHO MEAS - FS: 42.4 %
BH CV ECHO MEAS - IVS/LVPW: 1.02 CM
BH CV ECHO MEAS - IVSD: 1.12 CM
BH CV ECHO MEAS - LA 3D VOL INDEX: 48
BH CV ECHO MEAS - LAT PEAK E' VEL: 6.9 CM/SEC
BH CV ECHO MEAS - LV DIASTOLIC VOL/BSA (35-75): 57.2 CM2
BH CV ECHO MEAS - LV MASS(C)D: 182.6 GRAMS
BH CV ECHO MEAS - LV MAX PG: 8.5 MMHG
BH CV ECHO MEAS - LV MEAN PG: 4.1 MMHG
BH CV ECHO MEAS - LV SYSTOLIC VOL/BSA (12-30): 20.3 CM2
BH CV ECHO MEAS - LV V1 MAX: 145.7 CM/SEC
BH CV ECHO MEAS - LV V1 VTI: 35.1 CM
BH CV ECHO MEAS - LVIDD: 4.6 CM
BH CV ECHO MEAS - LVIDS: 2.6 CM
BH CV ECHO MEAS - LVOT AREA: 2.8 CM2
BH CV ECHO MEAS - LVOT DIAM: 1.9 CM
BH CV ECHO MEAS - LVPWD: 1.1 CM
BH CV ECHO MEAS - MED PEAK E' VEL: 6.6 CM/SEC
BH CV ECHO MEAS - MV A DUR: 0.12 SEC
BH CV ECHO MEAS - MV A MAX VEL: 93 CM/SEC
BH CV ECHO MEAS - MV DEC SLOPE: 449.1 CM/SEC2
BH CV ECHO MEAS - MV DEC TIME: 240 MSEC
BH CV ECHO MEAS - MV E MAX VEL: 124 CM/SEC
BH CV ECHO MEAS - MV E/A: 1.33
BH CV ECHO MEAS - MV MAX PG: 5.8 MMHG
BH CV ECHO MEAS - MV MEAN PG: 1.82 MMHG
BH CV ECHO MEAS - MV P1/2T: 75.3 MSEC
BH CV ECHO MEAS - MV V2 VTI: 42.1 CM
BH CV ECHO MEAS - MVA(P1/2T): 2.9 CM2
BH CV ECHO MEAS - MVA(VTI): 2.37 CM2
BH CV ECHO MEAS - PA ACC TIME: 0.2 SEC
BH CV ECHO MEAS - PA PR(ACCEL): -12.5 MMHG
BH CV ECHO MEAS - PA V2 MAX: 103.2 CM/SEC
BH CV ECHO MEAS - PULM A REVS DUR: 0.12 SEC
BH CV ECHO MEAS - PULM A REVS VEL: 28.2 CM/SEC
BH CV ECHO MEAS - PULM DIAS VEL: 56.9 CM/SEC
BH CV ECHO MEAS - PULM S/D: 1.36
BH CV ECHO MEAS - PULM SYS VEL: 77.1 CM/SEC
BH CV ECHO MEAS - RAP SYSTOLE: 8 MMHG
BH CV ECHO MEAS - RV MAX PG: 1.88 MMHG
BH CV ECHO MEAS - RV V1 MAX: 68.6 CM/SEC
BH CV ECHO MEAS - RV V1 VTI: 15.9 CM
BH CV ECHO MEAS - RVSP: 46 MMHG
BH CV ECHO MEAS - SI(MOD-SP2): 38.8 ML/M2
BH CV ECHO MEAS - SI(MOD-SP4): 37 ML/M2
BH CV ECHO MEAS - SV(LVOT): 99.6 ML
BH CV ECHO MEAS - SV(MOD-SP2): 65 ML
BH CV ECHO MEAS - SV(MOD-SP4): 62 ML
BH CV ECHO MEAS - TAPSE (>1.6): 1.64 CM
BH CV ECHO MEAS - TR MAX PG: 37.5 MMHG
BH CV ECHO MEAS - TR MAX VEL: 306.2 CM/SEC
BH CV ECHO MEASUREMENTS AVERAGE E/E' RATIO: 18.37
BH CV XLRA - RV BASE: 3.8 CM
BH CV XLRA - RV LENGTH: 8.3 CM
BH CV XLRA - RV MID: 2.48 CM
BH CV XLRA - TDI S': 9.7 CM/SEC
GLUCOSE BLDC GLUCOMTR-MCNC: 139 MG/DL (ref 70–130)
GLUCOSE BLDC GLUCOMTR-MCNC: 150 MG/DL (ref 70–130)
GLUCOSE BLDC GLUCOMTR-MCNC: 278 MG/DL (ref 70–130)
GLUCOSE BLDC GLUCOMTR-MCNC: 278 MG/DL (ref 70–130)
LEFT ATRIUM VOLUME INDEX: 44 ML/M2
MAXIMAL PREDICTED HEART RATE: 164 BPM
SINUS: 2.6 CM
STRESS TARGET HR: 139 BPM
TROPONIN T SERPL-MCNC: 0.18 NG/ML (ref 0–0.03)
VANCOMYCIN SERPL-MCNC: 24 MCG/ML (ref 5–40)

## 2022-07-29 PROCEDURE — G0378 HOSPITAL OBSERVATION PER HR: HCPCS

## 2022-07-29 PROCEDURE — 25010000002 CEFEPIME PER 500 MG: Performed by: INTERNAL MEDICINE

## 2022-07-29 PROCEDURE — 80202 ASSAY OF VANCOMYCIN: CPT | Performed by: INTERNAL MEDICINE

## 2022-07-29 PROCEDURE — 82962 GLUCOSE BLOOD TEST: CPT

## 2022-07-29 PROCEDURE — 96372 THER/PROPH/DIAG INJ SC/IM: CPT

## 2022-07-29 PROCEDURE — 76376 3D RENDER W/INTRP POSTPROCES: CPT

## 2022-07-29 PROCEDURE — 63710000001 INSULIN LISPRO (HUMAN) PER 5 UNITS: Performed by: INTERNAL MEDICINE

## 2022-07-29 PROCEDURE — 25010000002 EPOETIN ALFA-EPBX 10000 UNIT/ML SOLUTION: Performed by: INTERNAL MEDICINE

## 2022-07-29 PROCEDURE — 93306 TTE W/DOPPLER COMPLETE: CPT | Performed by: INTERNAL MEDICINE

## 2022-07-29 PROCEDURE — 76377 3D RENDER W/INTRP POSTPROCES: CPT | Performed by: INTERNAL MEDICINE

## 2022-07-29 PROCEDURE — 93306 TTE W/DOPPLER COMPLETE: CPT

## 2022-07-29 PROCEDURE — 63710000001 PREDNISONE PER 5 MG: Performed by: INTERNAL MEDICINE

## 2022-07-29 PROCEDURE — 84484 ASSAY OF TROPONIN QUANT: CPT | Performed by: INTERNAL MEDICINE

## 2022-07-29 PROCEDURE — 93356 MYOCRD STRAIN IMG SPCKL TRCK: CPT | Performed by: INTERNAL MEDICINE

## 2022-07-29 PROCEDURE — 97530 THERAPEUTIC ACTIVITIES: CPT

## 2022-07-29 PROCEDURE — 93356 MYOCRD STRAIN IMG SPCKL TRCK: CPT

## 2022-07-29 PROCEDURE — 99214 OFFICE O/P EST MOD 30 MIN: CPT | Performed by: INTERNAL MEDICINE

## 2022-07-29 RX ORDER — GABAPENTIN 100 MG/1
100 CAPSULE ORAL 2 TIMES DAILY PRN
Qty: 30 CAPSULE | Refills: 0 | Status: SHIPPED | OUTPATIENT
Start: 2022-07-29 | End: 2022-08-04 | Stop reason: HOSPADM

## 2022-07-29 RX ADMIN — Medication 10 ML: at 21:13

## 2022-07-29 RX ADMIN — Medication 3 MG: at 21:18

## 2022-07-29 RX ADMIN — OXYCODONE 5 MG: 5 TABLET ORAL at 21:18

## 2022-07-29 RX ADMIN — HYDRALAZINE HYDROCHLORIDE 50 MG: 50 TABLET, FILM COATED ORAL at 06:03

## 2022-07-29 RX ADMIN — CLONIDINE HYDROCHLORIDE 0.3 MG: 0.1 TABLET ORAL at 11:50

## 2022-07-29 RX ADMIN — MICONAZOLE NITRATE 1 APPLICATION: 20 CREAM TOPICAL at 08:46

## 2022-07-29 RX ADMIN — ROPINIROLE HYDROCHLORIDE 0.75 MG: 0.5 TABLET, FILM COATED ORAL at 21:12

## 2022-07-29 RX ADMIN — INSULIN LISPRO 8 UNITS: 100 INJECTION, SOLUTION INTRAVENOUS; SUBCUTANEOUS at 17:22

## 2022-07-29 RX ADMIN — LOSARTAN POTASSIUM 100 MG: 100 TABLET, FILM COATED ORAL at 08:44

## 2022-07-29 RX ADMIN — CLONIDINE HYDROCHLORIDE 0.3 MG: 0.1 TABLET ORAL at 21:12

## 2022-07-29 RX ADMIN — FOLIC ACID 1 MG: 1 TABLET ORAL at 08:44

## 2022-07-29 RX ADMIN — TAMSULOSIN HYDROCHLORIDE 0.4 MG: 0.4 CAPSULE ORAL at 08:44

## 2022-07-29 RX ADMIN — PANTOPRAZOLE SODIUM 40 MG: 40 TABLET, DELAYED RELEASE ORAL at 08:44

## 2022-07-29 RX ADMIN — LEVOTHYROXINE SODIUM 200 MCG: 0.1 TABLET ORAL at 06:03

## 2022-07-29 RX ADMIN — PREDNISONE 7.5 MG: 1 TABLET ORAL at 08:44

## 2022-07-29 RX ADMIN — ASPIRIN 81 MG: 81 TABLET, COATED ORAL at 08:44

## 2022-07-29 RX ADMIN — SERTRALINE 150 MG: 100 TABLET, FILM COATED ORAL at 08:44

## 2022-07-29 RX ADMIN — DOCUSATE SODIUM 50MG AND SENNOSIDES 8.6MG 2 TABLET: 8.6; 5 TABLET, FILM COATED ORAL at 08:44

## 2022-07-29 RX ADMIN — Medication 10 ML: at 08:45

## 2022-07-29 RX ADMIN — HYDRALAZINE HYDROCHLORIDE 75 MG: 50 TABLET, FILM COATED ORAL at 11:51

## 2022-07-29 RX ADMIN — CLONIDINE HYDROCHLORIDE 0.3 MG: 0.1 TABLET ORAL at 06:03

## 2022-07-29 RX ADMIN — EPOETIN ALFA-EPBX 10000 UNITS: 10000 INJECTION, SOLUTION INTRAVENOUS; SUBCUTANEOUS at 19:36

## 2022-07-29 RX ADMIN — OXYCODONE 5 MG: 5 TABLET ORAL at 08:44

## 2022-07-29 RX ADMIN — SPIRONOLACTONE 100 MG: 100 TABLET ORAL at 08:43

## 2022-07-29 RX ADMIN — GABAPENTIN 100 MG: 100 CAPSULE ORAL at 11:50

## 2022-07-29 RX ADMIN — HYDRALAZINE HYDROCHLORIDE 75 MG: 50 TABLET, FILM COATED ORAL at 21:12

## 2022-07-29 RX ADMIN — INSULIN GLARGINE-YFGN 10 UNITS: 100 INJECTION, SOLUTION SUBCUTANEOUS at 21:19

## 2022-07-30 LAB
GLUCOSE BLDC GLUCOMTR-MCNC: 143 MG/DL (ref 70–130)
GLUCOSE BLDC GLUCOMTR-MCNC: 213 MG/DL (ref 70–130)
GLUCOSE BLDC GLUCOMTR-MCNC: 217 MG/DL (ref 70–130)
GLUCOSE BLDC GLUCOMTR-MCNC: 220 MG/DL (ref 70–130)
GLUCOSE BLDC GLUCOMTR-MCNC: 66 MG/DL (ref 70–130)

## 2022-07-30 PROCEDURE — 82962 GLUCOSE BLOOD TEST: CPT

## 2022-07-30 PROCEDURE — 63710000001 PREDNISONE PER 5 MG: Performed by: INTERNAL MEDICINE

## 2022-07-30 PROCEDURE — G0257 UNSCHED DIALYSIS ESRD PT HOS: HCPCS

## 2022-07-30 PROCEDURE — 99214 OFFICE O/P EST MOD 30 MIN: CPT | Performed by: INTERNAL MEDICINE

## 2022-07-30 PROCEDURE — G0378 HOSPITAL OBSERVATION PER HR: HCPCS

## 2022-07-30 PROCEDURE — 63710000001 INSULIN LISPRO (HUMAN) PER 5 UNITS: Performed by: INTERNAL MEDICINE

## 2022-07-30 RX ORDER — AMLODIPINE BESYLATE 5 MG/1
5 TABLET ORAL
Status: DISCONTINUED | OUTPATIENT
Start: 2022-07-30 | End: 2022-07-31

## 2022-07-30 RX ORDER — HYDRALAZINE HYDROCHLORIDE 50 MG/1
100 TABLET, FILM COATED ORAL EVERY 8 HOURS SCHEDULED
Status: DISCONTINUED | OUTPATIENT
Start: 2022-07-30 | End: 2022-08-04 | Stop reason: HOSPADM

## 2022-07-30 RX ORDER — HEPARIN SODIUM 1000 [USP'U]/ML
4000 INJECTION, SOLUTION INTRAVENOUS; SUBCUTANEOUS AS NEEDED
Status: DISCONTINUED | OUTPATIENT
Start: 2022-07-30 | End: 2022-08-04 | Stop reason: HOSPADM

## 2022-07-30 RX ORDER — SPIRONOLACTONE 25 MG/1
12.5 TABLET ORAL DAILY
Status: DISCONTINUED | OUTPATIENT
Start: 2022-07-31 | End: 2022-08-02

## 2022-07-30 RX ADMIN — CLONIDINE HYDROCHLORIDE 0.3 MG: 0.1 TABLET ORAL at 06:57

## 2022-07-30 RX ADMIN — INSULIN LISPRO 5 UNITS: 100 INJECTION, SOLUTION INTRAVENOUS; SUBCUTANEOUS at 18:38

## 2022-07-30 RX ADMIN — ASPIRIN 81 MG: 81 TABLET, COATED ORAL at 12:50

## 2022-07-30 RX ADMIN — PANTOPRAZOLE SODIUM 40 MG: 40 TABLET, DELAYED RELEASE ORAL at 12:52

## 2022-07-30 RX ADMIN — LOSARTAN POTASSIUM 100 MG: 100 TABLET, FILM COATED ORAL at 12:48

## 2022-07-30 RX ADMIN — OXYCODONE 5 MG: 5 TABLET ORAL at 21:39

## 2022-07-30 RX ADMIN — Medication 10 ML: at 12:55

## 2022-07-30 RX ADMIN — SERTRALINE 150 MG: 100 TABLET, FILM COATED ORAL at 12:50

## 2022-07-30 RX ADMIN — CLONIDINE HYDROCHLORIDE 0.3 MG: 0.1 TABLET ORAL at 21:32

## 2022-07-30 RX ADMIN — SPIRONOLACTONE 100 MG: 100 TABLET ORAL at 12:51

## 2022-07-30 RX ADMIN — Medication 10 ML: at 21:32

## 2022-07-30 RX ADMIN — Medication 3 MG: at 23:17

## 2022-07-30 RX ADMIN — INSULIN GLARGINE-YFGN 10 UNITS: 100 INJECTION, SOLUTION SUBCUTANEOUS at 21:35

## 2022-07-30 RX ADMIN — MICONAZOLE NITRATE 1 APPLICATION: 20 CREAM TOPICAL at 22:31

## 2022-07-30 RX ADMIN — ACETAMINOPHEN 650 MG: 325 TABLET, FILM COATED ORAL at 23:17

## 2022-07-30 RX ADMIN — TAMSULOSIN HYDROCHLORIDE 0.4 MG: 0.4 CAPSULE ORAL at 12:50

## 2022-07-30 RX ADMIN — AMLODIPINE BESYLATE 5 MG: 5 TABLET ORAL at 21:33

## 2022-07-30 RX ADMIN — LEVOTHYROXINE SODIUM 200 MCG: 0.1 TABLET ORAL at 06:57

## 2022-07-30 RX ADMIN — HYDRALAZINE HYDROCHLORIDE 75 MG: 50 TABLET, FILM COATED ORAL at 15:04

## 2022-07-30 RX ADMIN — PREDNISONE 7.5 MG: 1 TABLET ORAL at 12:44

## 2022-07-30 RX ADMIN — HYDRALAZINE HYDROCHLORIDE 75 MG: 50 TABLET, FILM COATED ORAL at 06:57

## 2022-07-30 RX ADMIN — GABAPENTIN 100 MG: 100 CAPSULE ORAL at 23:17

## 2022-07-30 RX ADMIN — HYDRALAZINE HYDROCHLORIDE 100 MG: 50 TABLET, FILM COATED ORAL at 21:32

## 2022-07-30 RX ADMIN — OXYCODONE 5 MG: 5 TABLET ORAL at 12:51

## 2022-07-30 RX ADMIN — DOCUSATE SODIUM 50MG AND SENNOSIDES 8.6MG 2 TABLET: 8.6; 5 TABLET, FILM COATED ORAL at 21:31

## 2022-07-30 RX ADMIN — ROPINIROLE HYDROCHLORIDE 0.75 MG: 0.5 TABLET, FILM COATED ORAL at 21:31

## 2022-07-30 RX ADMIN — CLONIDINE HYDROCHLORIDE 0.3 MG: 0.1 TABLET ORAL at 15:05

## 2022-07-30 RX ADMIN — FOLIC ACID 1 MG: 1 TABLET ORAL at 12:51

## 2022-07-31 LAB
ALBUMIN SERPL-MCNC: 2.8 G/DL (ref 3.5–5.2)
ANION GAP SERPL CALCULATED.3IONS-SCNC: 9 MMOL/L (ref 5–15)
BUN SERPL-MCNC: 23 MG/DL (ref 6–20)
BUN/CREAT SERPL: 8.3 (ref 7–25)
CALCIUM SPEC-SCNC: 7.8 MG/DL (ref 8.6–10.5)
CHLORIDE SERPL-SCNC: 99 MMOL/L (ref 98–107)
CO2 SERPL-SCNC: 26 MMOL/L (ref 22–29)
CREAT SERPL-MCNC: 2.76 MG/DL (ref 0.57–1)
EGFRCR SERPLBLD CKD-EPI 2021: 19.6 ML/MIN/1.73
GLUCOSE BLDC GLUCOMTR-MCNC: 108 MG/DL (ref 70–130)
GLUCOSE BLDC GLUCOMTR-MCNC: 129 MG/DL (ref 70–130)
GLUCOSE BLDC GLUCOMTR-MCNC: 214 MG/DL (ref 70–130)
GLUCOSE BLDC GLUCOMTR-MCNC: 46 MG/DL (ref 70–130)
GLUCOSE BLDC GLUCOMTR-MCNC: 47 MG/DL (ref 70–130)
GLUCOSE BLDC GLUCOMTR-MCNC: 67 MG/DL (ref 70–130)
GLUCOSE SERPL-MCNC: 83 MG/DL (ref 65–99)
PHOSPHATE SERPL-MCNC: 2.9 MG/DL (ref 2.5–4.5)
POTASSIUM SERPL-SCNC: 4 MMOL/L (ref 3.5–5.2)
SODIUM SERPL-SCNC: 134 MMOL/L (ref 136–145)

## 2022-07-31 PROCEDURE — 63710000001 PREDNISONE PER 5 MG: Performed by: INTERNAL MEDICINE

## 2022-07-31 PROCEDURE — 82962 GLUCOSE BLOOD TEST: CPT

## 2022-07-31 PROCEDURE — 80069 RENAL FUNCTION PANEL: CPT | Performed by: INTERNAL MEDICINE

## 2022-07-31 PROCEDURE — G0378 HOSPITAL OBSERVATION PER HR: HCPCS

## 2022-07-31 PROCEDURE — 96376 TX/PRO/DX INJ SAME DRUG ADON: CPT

## 2022-07-31 PROCEDURE — 96375 TX/PRO/DX INJ NEW DRUG ADDON: CPT

## 2022-07-31 PROCEDURE — 99214 OFFICE O/P EST MOD 30 MIN: CPT | Performed by: INTERNAL MEDICINE

## 2022-07-31 RX ORDER — CLONIDINE HYDROCHLORIDE 0.1 MG/1
0.1 TABLET ORAL EVERY 8 HOURS SCHEDULED
Status: DISCONTINUED | OUTPATIENT
Start: 2022-07-31 | End: 2022-08-04 | Stop reason: HOSPADM

## 2022-07-31 RX ORDER — ALBUMIN (HUMAN) 12.5 G/50ML
12.5 SOLUTION INTRAVENOUS AS NEEDED
Status: CANCELLED | OUTPATIENT
Start: 2022-08-01 | End: 2022-08-01

## 2022-07-31 RX ORDER — AMLODIPINE BESYLATE 5 MG/1
5 TABLET ORAL 2 TIMES DAILY
Status: DISCONTINUED | OUTPATIENT
Start: 2022-07-31 | End: 2022-08-04 | Stop reason: HOSPADM

## 2022-07-31 RX ADMIN — PANTOPRAZOLE SODIUM 40 MG: 40 TABLET, DELAYED RELEASE ORAL at 08:19

## 2022-07-31 RX ADMIN — LEVOTHYROXINE SODIUM 200 MCG: 0.1 TABLET ORAL at 05:42

## 2022-07-31 RX ADMIN — HYDRALAZINE HYDROCHLORIDE 100 MG: 50 TABLET, FILM COATED ORAL at 16:49

## 2022-07-31 RX ADMIN — DEXTROSE MONOHYDRATE 25 G: 25 INJECTION, SOLUTION INTRAVENOUS at 06:25

## 2022-07-31 RX ADMIN — TAMSULOSIN HYDROCHLORIDE 0.4 MG: 0.4 CAPSULE ORAL at 08:18

## 2022-07-31 RX ADMIN — DOCUSATE SODIUM 50MG AND SENNOSIDES 8.6MG 2 TABLET: 8.6; 5 TABLET, FILM COATED ORAL at 21:49

## 2022-07-31 RX ADMIN — SPIRONOLACTONE 12.5 MG: 25 TABLET, FILM COATED ORAL at 08:19

## 2022-07-31 RX ADMIN — DEXTROSE MONOHYDRATE 25 G: 25 INJECTION, SOLUTION INTRAVENOUS at 11:07

## 2022-07-31 RX ADMIN — OXYCODONE 5 MG: 5 TABLET ORAL at 03:10

## 2022-07-31 RX ADMIN — PREDNISONE 7.5 MG: 1 TABLET ORAL at 08:19

## 2022-07-31 RX ADMIN — OXYCODONE 5 MG: 5 TABLET ORAL at 08:19

## 2022-07-31 RX ADMIN — HYDRALAZINE HYDROCHLORIDE 10 MG: 10 TABLET, FILM COATED ORAL at 01:02

## 2022-07-31 RX ADMIN — HYDRALAZINE HYDROCHLORIDE 100 MG: 50 TABLET, FILM COATED ORAL at 21:51

## 2022-07-31 RX ADMIN — HYDRALAZINE HYDROCHLORIDE 100 MG: 50 TABLET, FILM COATED ORAL at 05:40

## 2022-07-31 RX ADMIN — ASPIRIN 81 MG: 81 TABLET, COATED ORAL at 08:19

## 2022-07-31 RX ADMIN — Medication 3 MG: at 21:50

## 2022-07-31 RX ADMIN — DOCUSATE SODIUM 50MG AND SENNOSIDES 8.6MG 2 TABLET: 8.6; 5 TABLET, FILM COATED ORAL at 08:19

## 2022-07-31 RX ADMIN — FOLIC ACID 1 MG: 1 TABLET ORAL at 08:18

## 2022-07-31 RX ADMIN — AMLODIPINE BESYLATE 5 MG: 5 TABLET ORAL at 21:50

## 2022-07-31 RX ADMIN — OXYCODONE 5 MG: 5 TABLET ORAL at 21:49

## 2022-07-31 RX ADMIN — Medication 10 ML: at 08:19

## 2022-07-31 RX ADMIN — LOSARTAN POTASSIUM 100 MG: 100 TABLET, FILM COATED ORAL at 08:19

## 2022-07-31 RX ADMIN — CLONIDINE HYDROCHLORIDE 0.3 MG: 0.1 TABLET ORAL at 05:40

## 2022-07-31 RX ADMIN — Medication 10 ML: at 21:51

## 2022-07-31 RX ADMIN — CLONIDINE HYDROCHLORIDE 0.1 MG: 0.1 TABLET ORAL at 21:51

## 2022-07-31 RX ADMIN — ROPINIROLE HYDROCHLORIDE 0.75 MG: 0.5 TABLET, FILM COATED ORAL at 21:50

## 2022-07-31 RX ADMIN — SERTRALINE 150 MG: 100 TABLET, FILM COATED ORAL at 08:18

## 2022-08-01 ENCOUNTER — APPOINTMENT (OUTPATIENT)
Dept: NUCLEAR MEDICINE | Facility: HOSPITAL | Age: 57
End: 2022-08-01

## 2022-08-01 LAB
ALBUMIN SERPL-MCNC: 2.6 G/DL (ref 3.5–5.2)
ANION GAP SERPL CALCULATED.3IONS-SCNC: 13 MMOL/L (ref 5–15)
BACTERIA SPEC AEROBE CULT: NORMAL
BACTERIA SPEC AEROBE CULT: NORMAL
BH CV REST NUCLEAR ISOTOPE DOSE: 10 MCI
BUN SERPL-MCNC: 28 MG/DL (ref 6–20)
BUN/CREAT SERPL: 7.7 (ref 7–25)
CALCIUM SPEC-SCNC: 7.6 MG/DL (ref 8.6–10.5)
CHLORIDE SERPL-SCNC: 95 MMOL/L (ref 98–107)
CO2 SERPL-SCNC: 21 MMOL/L (ref 22–29)
CREAT SERPL-MCNC: 3.63 MG/DL (ref 0.57–1)
DEPRECATED RDW RBC AUTO: 50.2 FL (ref 37–54)
EGFRCR SERPLBLD CKD-EPI 2021: 14.1 ML/MIN/1.73
ERYTHROCYTE [DISTWIDTH] IN BLOOD BY AUTOMATED COUNT: 17 % (ref 12.3–15.4)
GLUCOSE BLDC GLUCOMTR-MCNC: 158 MG/DL (ref 70–130)
GLUCOSE BLDC GLUCOMTR-MCNC: 182 MG/DL (ref 70–130)
GLUCOSE BLDC GLUCOMTR-MCNC: 279 MG/DL (ref 70–130)
GLUCOSE BLDC GLUCOMTR-MCNC: 299 MG/DL (ref 70–130)
GLUCOSE BLDC GLUCOMTR-MCNC: 312 MG/DL (ref 70–130)
GLUCOSE BLDC GLUCOMTR-MCNC: 328 MG/DL (ref 70–130)
GLUCOSE SERPL-MCNC: 301 MG/DL (ref 65–99)
HCT VFR BLD AUTO: 29.5 % (ref 34–46.6)
HGB BLD-MCNC: 8.9 G/DL (ref 12–15.9)
MAXIMAL PREDICTED HEART RATE: 164 BPM
MCH RBC QN AUTO: 24.9 PG (ref 26.6–33)
MCHC RBC AUTO-ENTMCNC: 30.2 G/DL (ref 31.5–35.7)
MCV RBC AUTO: 82.6 FL (ref 79–97)
PHOSPHATE SERPL-MCNC: 3.5 MG/DL (ref 2.5–4.5)
PLATELET # BLD AUTO: 312 10*3/MM3 (ref 140–450)
PMV BLD AUTO: 10.2 FL (ref 6–12)
POTASSIUM SERPL-SCNC: 4.7 MMOL/L (ref 3.5–5.2)
RBC # BLD AUTO: 3.57 10*6/MM3 (ref 3.77–5.28)
SODIUM SERPL-SCNC: 129 MMOL/L (ref 136–145)
STRESS TARGET HR: 139 BPM
WBC NRBC COR # BLD: 14.32 10*3/MM3 (ref 3.4–10.8)

## 2022-08-01 PROCEDURE — 94761 N-INVAS EAR/PLS OXIMETRY MLT: CPT

## 2022-08-01 PROCEDURE — 63710000001 PREDNISONE PER 5 MG: Performed by: INTERNAL MEDICINE

## 2022-08-01 PROCEDURE — 25010000002 EPOETIN ALFA-EPBX 10000 UNIT/ML SOLUTION: Performed by: INTERNAL MEDICINE

## 2022-08-01 PROCEDURE — 80069 RENAL FUNCTION PANEL: CPT | Performed by: INTERNAL MEDICINE

## 2022-08-01 PROCEDURE — A9500 TC99M SESTAMIBI: HCPCS | Performed by: INTERNAL MEDICINE

## 2022-08-01 PROCEDURE — G0378 HOSPITAL OBSERVATION PER HR: HCPCS

## 2022-08-01 PROCEDURE — 78451 HT MUSCLE IMAGE SPECT SING: CPT

## 2022-08-01 PROCEDURE — 99222 1ST HOSP IP/OBS MODERATE 55: CPT

## 2022-08-01 PROCEDURE — 85027 COMPLETE CBC AUTOMATED: CPT | Performed by: INTERNAL MEDICINE

## 2022-08-01 PROCEDURE — 94664 DEMO&/EVAL PT USE INHALER: CPT

## 2022-08-01 PROCEDURE — 99232 SBSQ HOSP IP/OBS MODERATE 35: CPT | Performed by: INTERNAL MEDICINE

## 2022-08-01 PROCEDURE — 94799 UNLISTED PULMONARY SVC/PX: CPT

## 2022-08-01 PROCEDURE — G0257 UNSCHED DIALYSIS ESRD PT HOS: HCPCS

## 2022-08-01 PROCEDURE — 63710000001 INSULIN LISPRO (HUMAN) PER 5 UNITS: Performed by: INTERNAL MEDICINE

## 2022-08-01 PROCEDURE — 82962 GLUCOSE BLOOD TEST: CPT

## 2022-08-01 PROCEDURE — 25010000002 HEPARIN (PORCINE) PER 1000 UNITS: Performed by: INTERNAL MEDICINE

## 2022-08-01 PROCEDURE — 96372 THER/PROPH/DIAG INJ SC/IM: CPT

## 2022-08-01 PROCEDURE — 0 TECHNETIUM SESTAMIBI: Performed by: INTERNAL MEDICINE

## 2022-08-01 RX ADMIN — OXYCODONE 5 MG: 5 TABLET ORAL at 22:52

## 2022-08-01 RX ADMIN — Medication 10 ML: at 10:16

## 2022-08-01 RX ADMIN — HYDRALAZINE HYDROCHLORIDE 100 MG: 50 TABLET, FILM COATED ORAL at 05:19

## 2022-08-01 RX ADMIN — CLONIDINE HYDROCHLORIDE 0.1 MG: 0.1 TABLET ORAL at 22:52

## 2022-08-01 RX ADMIN — LOSARTAN POTASSIUM 100 MG: 100 TABLET, FILM COATED ORAL at 10:18

## 2022-08-01 RX ADMIN — ROPINIROLE HYDROCHLORIDE 0.75 MG: 0.5 TABLET, FILM COATED ORAL at 22:44

## 2022-08-01 RX ADMIN — TAMSULOSIN HYDROCHLORIDE 0.4 MG: 0.4 CAPSULE ORAL at 10:10

## 2022-08-01 RX ADMIN — PANTOPRAZOLE SODIUM 40 MG: 40 TABLET, DELAYED RELEASE ORAL at 10:09

## 2022-08-01 RX ADMIN — CLONIDINE HYDROCHLORIDE 0.1 MG: 0.1 TABLET ORAL at 05:19

## 2022-08-01 RX ADMIN — HYDRALAZINE HYDROCHLORIDE 100 MG: 50 TABLET, FILM COATED ORAL at 22:45

## 2022-08-01 RX ADMIN — CLONIDINE HYDROCHLORIDE 0.1 MG: 0.1 TABLET ORAL at 16:53

## 2022-08-01 RX ADMIN — PREDNISONE 7.5 MG: 1 TABLET ORAL at 10:11

## 2022-08-01 RX ADMIN — Medication 10 ML: at 21:18

## 2022-08-01 RX ADMIN — FOLIC ACID 1 MG: 1 TABLET ORAL at 10:08

## 2022-08-01 RX ADMIN — INSULIN GLARGINE-YFGN 10 UNITS: 100 INJECTION, SOLUTION SUBCUTANEOUS at 21:16

## 2022-08-01 RX ADMIN — AMLODIPINE BESYLATE 5 MG: 5 TABLET ORAL at 10:14

## 2022-08-01 RX ADMIN — TECHNETIUM TC 99M SESTAMIBI 1 DOSE: 1 INJECTION INTRAVENOUS at 06:30

## 2022-08-01 RX ADMIN — OXYCODONE 5 MG: 5 TABLET ORAL at 16:53

## 2022-08-01 RX ADMIN — ASPIRIN 81 MG: 81 TABLET, COATED ORAL at 10:09

## 2022-08-01 RX ADMIN — INSULIN LISPRO 3 UNITS: 100 INJECTION, SOLUTION INTRAVENOUS; SUBCUTANEOUS at 16:53

## 2022-08-01 RX ADMIN — HEPARIN SODIUM 4000 UNITS: 1000 INJECTION INTRAVENOUS; SUBCUTANEOUS at 16:01

## 2022-08-01 RX ADMIN — MICONAZOLE NITRATE 1 APPLICATION: 20 CREAM TOPICAL at 22:46

## 2022-08-01 RX ADMIN — GABAPENTIN 100 MG: 100 CAPSULE ORAL at 11:48

## 2022-08-01 RX ADMIN — INSULIN LISPRO 10 UNITS: 100 INJECTION, SOLUTION INTRAVENOUS; SUBCUTANEOUS at 10:22

## 2022-08-01 RX ADMIN — ALBUTEROL SULFATE 2.5 MG: 2.5 SOLUTION RESPIRATORY (INHALATION) at 11:01

## 2022-08-01 RX ADMIN — SPIRONOLACTONE 12.5 MG: 25 TABLET, FILM COATED ORAL at 10:12

## 2022-08-01 RX ADMIN — LEVOTHYROXINE SODIUM 200 MCG: 0.1 TABLET ORAL at 05:19

## 2022-08-01 RX ADMIN — SERTRALINE 150 MG: 100 TABLET, FILM COATED ORAL at 10:10

## 2022-08-01 RX ADMIN — OXYCODONE 5 MG: 5 TABLET ORAL at 11:52

## 2022-08-01 RX ADMIN — EPOETIN ALFA-EPBX 10000 UNITS: 10000 INJECTION, SOLUTION INTRAVENOUS; SUBCUTANEOUS at 16:54

## 2022-08-01 RX ADMIN — Medication 3 MG: at 22:52

## 2022-08-01 RX ADMIN — OXYCODONE 5 MG: 5 TABLET ORAL at 02:22

## 2022-08-01 NOTE — PROGRESS NOTES
Name: Zaina Martinez ADMIT: 2022   : 1965  PCP: Karson Oreilly MD    MRN: [id number] LOS: 0 days   AGE/SEX: 56 y.o. female  ROOM: [facility]     Subjective   Subjective   CC: AMS  No acute events. Patient has no new complaints. Overall feels about the same. Taking PO. No CP/dyspnea/f/c/n/v/d.  No family at bedside.  Objective   Objective   Vital Signs  Temp:  [96.8 °F (36 °C)-98.6 °F (37 °C)] 98.6 °F (37 °C)  Heart Rate:  [44-55] 54  Resp:  [16-20] 18  BP: (113-180)/(58-85) 174/75  SpO2:  [93 %-97 %] 94 %  on   ;   Device (Oxygen Therapy): room air  Body mass index is 27.54 kg/m².  Physical Exam  Vitals and nursing note reviewed.   Constitutional:       General: She is not in acute distress.     Appearance: She is not toxic-appearing or diaphoretic.   HENT:      Head: Normocephalic and atraumatic.      Nose: Nose normal.      Mouth/Throat:      Mouth: Mucous membranes are moist.      Pharynx: Oropharynx is clear.   Eyes:      Conjunctiva/sclera: Conjunctivae normal.      Pupils: Pupils are equal, round, and reactive to light.   Cardiovascular:      Rate and Rhythm: Normal rate and regular rhythm.      Pulses: Normal pulses.   Pulmonary:      Effort: Pulmonary effort is normal.      Breath sounds: Examination of the right-lower field reveals decreased breath sounds. Examination of the left-lower field reveals decreased breath sounds. Decreased breath sounds present.   Abdominal:      General: Bowel sounds are normal.      Palpations: Abdomen is soft.   Musculoskeletal:         General: Swelling (1-2+ BLE) present. No tenderness.      Cervical back: Normal range of motion and neck supple.   Skin:     General: Skin is warm and dry.      Capillary Refill: Capillary refill takes less than 2 seconds.   Neurological:      General: No focal deficit present.      Mental Status: She is alert and oriented to person, place, and time.   Psychiatric:         Mood and Affect: Mood normal.         Behavior: Behavior  normal.       Results Review     I reviewed the patient's new clinical results.  I reviewed the patient's telemetry.  Results from last 7 days   Lab Units 07/28/22  0450 07/27/22  1232   WBC 10*3/mm3 16.18* 18.02*   HEMOGLOBIN g/dL 7.9* 8.2*   PLATELETS 10*3/mm3 297 299     Results from last 7 days   Lab Units 08/01/22  0549 07/31/22  0541 07/28/22  0450 07/27/22  1232   SODIUM mmol/L 129* 134* 137 128*   POTASSIUM mmol/L 4.7 4.0 3.5 5.0   CHLORIDE mmol/L 95* 99 98 90*   CO2 mmol/L 21.0* 26.0 27.5 21.4*   BUN mg/dL 28* 23* 18 43*   CREATININE mg/dL 3.63* 2.76* 2.36* 4.33*   GLUCOSE mg/dL 301* 83 113* 459*   EGFR mL/min/1.73 14.1* 19.6* 23.6* 11.4*     Results from last 7 days   Lab Units 08/01/22  0549 07/31/22  0541 07/28/22  0450 07/27/22  1232   ALBUMIN g/dL 2.60* 2.80* 3.00* 3.10*   BILIRUBIN mg/dL  --   --  0.6 0.5   ALK PHOS U/L  --   --  236* 283*   AST (SGOT) U/L  --   --  27 22   ALT (SGPT) U/L  --   --  15 13     Results from last 7 days   Lab Units 08/01/22  0549 07/31/22  0541 07/28/22  0450 07/27/22  1232   CALCIUM mg/dL 7.6* 7.8* 7.9* 8.1*   ALBUMIN g/dL 2.60* 2.80* 3.00* 3.10*   PHOSPHORUS mg/dL 3.5 2.9  --   --      Results from last 7 days   Lab Units 07/27/22  1421 07/27/22  1232   PROCALCITONIN ng/mL  --  0.81*   LACTATE mmol/L 1.0  --      Glucose   Date/Time Value Ref Range Status   08/01/2022 1141 279 (H) 70 - 130 mg/dL Final     Comment:     Meter: [id number] : [id number] Jose Baha NA   08/01/2022 1009 312 (H) 70 - 130 mg/dL Final     Comment:     Meter: [id number] : [id number] Jose Baha NA   08/01/2022 0622 328 (H) 70 - 130 mg/dL Final     Comment:     Meter: [id number] : [id number] Heron Angela NA   08/01/2022 0223 299 (H) 70 - 130 mg/dL Final     Comment:     Meter: [id number] : [id number] Heron Angela NA   07/31/2022 2038 214 (H) 70 - 130 mg/dL Final     Comment:     Meter: [id number] : [id number] Heron Angela NA   07/31/2022 1607 108 70 - 130 mg/dL Final      Comment:     Meter: [id number] : [id number] Gabriela SAAVEDRA   07/31/2022 1135 129 70 - 130 mg/dL Final     Comment:     Meter: [id number] : [id number] Gian Garcia RN       No radiology results for the last day  Scheduled Medications  albuterol, 2.5 mg, Nebulization, 4x Daily - RT  amLODIPine, 5 mg, Oral, BID  aspirin, 81 mg, Oral, Daily  cloNIDine, 0.1 mg, Oral, Q8H  epoetin brooke-epbx, 10,000 Units, Subcutaneous, Once per day on Mon Wed Fri  folic acid, 1 mg, Oral, Daily  gabapentin, 100 mg, Oral, Daily With Lunch  hydrALAZINE, 100 mg, Oral, Q8H  insulin glargine, 10 Units, Subcutaneous, Nightly  insulin lispro, 0-14 Units, Subcutaneous, TID AC  levothyroxine, 200 mcg, Oral, Q AM  lidocaine, 1 patch, Transdermal, Q24H  losartan, 100 mg, Oral, Q24H  miconazole, 1 application, Topical, Q12H  pantoprazole, 40 mg, Oral, Daily  [START ON 8/5/2022] predniSONE, 5 mg, Oral, Daily With Breakfast  predniSONE, 7.5 mg, Oral, Daily With Breakfast  rOPINIRole, 0.75 mg, Oral, Nightly  senna-docusate sodium, 2 tablet, Oral, BID  sertraline, 150 mg, Oral, Daily  sodium chloride, 10 mL, Intravenous, Q12H  spironolactone, 12.5 mg, Oral, Daily  tamsulosin, 0.4 mg, Oral, Daily    Infusions   Diet  Diet Regular; Cardiac       Assessment/Plan     Active Hospital Problems    Diagnosis  POA   • **Metabolic encephalopathy [G93.41]  Unknown   • History of stroke- R MCA s/p TPA with subsequent ICH with debility [Z86.73]  Not Applicable   • Heart murmur [R01.1]  Yes   • Bradycardia [R00.1]  Yes   • Hyponatremia [E87.1]  Yes   • Anemia due to chronic kidney disease, on chronic dialysis (HCC) [N18.6, D63.1, Z99.2]  Not Applicable   • Chronic diastolic CHF (congestive heart failure) ([facility]) [I50.32]  Yes   • ESRD (end stage renal disease) (HCC) [N18.6]  Yes   • Hypothyroidism [E03.9]  Yes   • Esophageal dysmotility [K22.4]  Yes   • Rheumatoid arthritis (HCC) [M06.9]  Yes   • Type 2 diabetes mellitus with kidney  complication, with long-term current use of insulin (HCC) [E11.29, Z79.4]  Not Applicable      Resolved Hospital Problems   No resolved problems to display.   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 Agrawal today-likely does not have ischemia and has elevated BP and low HR so stress test cancelled-will instead try to focus on controlling BP and reducing volume per nephrology with dialysis, EP consulted about the possibility of pacemaker placement    Rheumatoid Arthritis  - on prednisone    SCDs for DVT prophylaxis.  Full code.  Discussed with patient, nursing staff, consulting provider and care team on multidisciplinary rounds.  Anticipate discharge home with HH vs SNU facility in 1-2 days.      Mat Marte MD  [facility]  08/01/22  12:48 EDT

## 2022-08-01 NOTE — PROGRESS NOTES
[facility] Progress Note      Patient Name: Zaina Martinez  : 1965  MRN: [id number]  Primary Care Physician:  Karson Oreilly MD  Date of admission: 2022    Subjective     Interval History:     Seen and examined.  On her way to stress test.  Denies shortness of air.  Making good urine.    Review of Systems:   As noted above    Objective     Vitals:   Temp:  [96.8 °F (36 °C)-98.6 °F (37 °C)] 98.6 °F (37 °C)  Heart Rate:  [44-55] 52  Resp:  [16-20] 20  BP: (113-180)/(58-85) 166/73  No intake or output data in the 24 hours ending 22 0752    Physical Exam:    General Appearance: alert, oriented x 3, NAD, chronically ill, slightly blunted  Skin: warm and dry  HEENT: oral mucosa normal, nonicteric sclera  Neck: supple, no JVD  Lungs: CTA, not labored on RA  Heart: RR, bradycardic, normal S1 and S2  Abdomen: soft, nontender, nondistended, BS +  : no palpable bladder  Extremities: no edema, cyanosis or clubbing  Vascular: Right chest TDC  Neuro: normal speech and mental status     Scheduled Meds:     albuterol, 2.5 mg, Nebulization, 4x Daily - RT  amLODIPine, 5 mg, Oral, BID  aspirin, 81 mg, Oral, Daily  cloNIDine, 0.1 mg, Oral, Q8H  epoetin brooke-epbx, 10,000 Units, Subcutaneous, Once per day on   folic acid, 1 mg, Oral, Daily  gabapentin, 100 mg, Oral, Daily With Lunch  hydrALAZINE, 100 mg, Oral, Q8H  insulin glargine, 10 Units, Subcutaneous, Nightly  insulin lispro, 0-14 Units, Subcutaneous, TID AC  levothyroxine, 200 mcg, Oral, Q AM  lidocaine, 1 patch, Transdermal, Q24H  losartan, 100 mg, Oral, Q24H  miconazole, 1 application, Topical, Q12H  pantoprazole, 40 mg, Oral, Daily  [START ON 2022] predniSONE, 5 mg, Oral, Daily With Breakfast  predniSONE, 7.5 mg, Oral, Daily With Breakfast  rOPINIRole, 0.75 mg, Oral, Nightly  senna-docusate sodium, 2 tablet, Oral, BID  sertraline, 150 mg, Oral, Daily  sodium chloride, 10 mL, Intravenous, Q12H  spironolactone,  12.5 mg, Oral, Daily  tamsulosin, 0.4 mg, Oral, Daily      IV Meds:        Results Reviewed:   I have personally reviewed the results from the time of this admission to 8/1/2022 07:52 EDT     Results from last 7 days   Lab Units 08/01/22  0549 07/31/22  0541 07/28/22  0450 07/27/22  1232   SODIUM mmol/L 129* 134* 137 128*   POTASSIUM mmol/L 4.7 4.0 3.5 5.0   CHLORIDE mmol/L 95* 99 98 90*   CO2 mmol/L 21.0* 26.0 27.5 21.4*   BUN mg/dL 28* 23* 18 43*   CREATININE mg/dL 3.63* 2.76* 2.36* 4.33*   CALCIUM mg/dL 7.6* 7.8* 7.9* 8.1*   BILIRUBIN mg/dL  --   --  0.6 0.5   ALK PHOS U/L  --   --  236* 283*   ALT (SGPT) U/L  --   --  15 13   AST (SGOT) U/L  --   --  27 22   GLUCOSE mg/dL 301* 83 113* 459*     Estimated Creatinine Clearance: 15.7 mL/min (A) (by C-G formula based on SCr of 3.63 mg/dL (H)).  Results from last 7 days   Lab Units 08/01/22  0549 07/31/22  0541   PHOSPHORUS mg/dL 3.5 2.9         Results from last 7 days   Lab Units 07/28/22  0450 07/27/22  1232   WBC 10*3/mm3 16.18* 18.02*   HEMOGLOBIN g/dL 7.9* 8.2*   PLATELETS 10*3/mm3 297 299           Assessment / Plan     ASSESSMENT:  1.  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 HD MWF, with stable volume and electrolytes  2.  Intracerebral bleed and altered mental status, with latter resolving. CT head showed no new findings  3.  Myoclonic jerks: resolved  4.  DM2, with labile readings  5.  Coronary artery disease  6.  Acute on chronic diastolic dysfunction .   7.  Anemia of CKD, on long-acting ANDRAE as an outpatient.    8.  Hypertension, fairly well controlled  9.  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 On ASA.      PLAN:    1.  HD today (MWF) with ongoing lowering of DW  2.   surveillance labs      Thank you for involving us in the care of Zaina RAJ Juan.  Please feel free to call with any questions.    Ruthann Palmer MD  08/01/22  07:52 EDT    [facility]  [phone]

## 2022-08-01 NOTE — PLAN OF CARE
Goal Outcome Evaluation:              Outcome Evaluation: Pt aox4. Plan for stress test and dialysis today. Still c/o pian in left LE. Will CTM

## 2022-08-01 NOTE — SIGNIFICANT NOTE
08/01/22 1248   OTHER   Discipline occupational therapist   Rehab Time/Intention   Session Not Performed patient unavailable for treatment  (2 attempts for OT today. Pt off floor to nuclear med and how in dialysis.  Will follow up another service date.)   Recommendation   OT - Next Appointment 08/02/22

## 2022-08-01 NOTE — PROGRESS NOTES
LOS: 0 days   Patient Care Team:  Karson Oreilly MD as PCP - General (Family Medicine)    Chief Complaint:     F/u HTN and NSTEMI    Interval History:     She came in with confusion which has resolved but she says that she feels very fatigued and breathless with activity.  She is not having any active chest pain or pressure.  She does not have significant dizziness.  She does her hemodialysis regularly Monday Wednesday Friday and does not miss.  She has really no orthopnea.  Her energy level is fairly low.  She does not feel her heart racing.    Despite being on multiple blood pressure medications, she is still hypertensive.    Objective   Vital Signs  Temp:  [96.8 °F (36 °C)-98.6 °F (37 °C)] 98.6 °F (37 °C)  Heart Rate:  [44-55] 52  Resp:  [16-20] 20  BP: (113-180)/(58-85) 166/73  No intake or output data in the 24 hours ending 08/01/22 0842    Comfortable NAD  Neck supple, no JVD or thyromegaly appreciated  S1/S2 regular with bradycardia, no /r/g, 3/6 BRY  Lungs CTA B, normal effort  Abdomen S/NT/ND (+) BS, no HSM appreciated  Extremities warm, no clubbing, cyanosis, or edema  No visible or palpable skin lesions  A/Ox4, mood and affect appropriate    Results Review:      Results from last 7 days   Lab Units 08/01/22  0549 07/31/22  0541 07/28/22  0450   SODIUM mmol/L 129* 134* 137   POTASSIUM mmol/L 4.7 4.0 3.5   CHLORIDE mmol/L 95* 99 98   CO2 mmol/L 21.0* 26.0 27.5   BUN mg/dL 28* 23* 18   CREATININE mg/dL 3.63* 2.76* 2.36*   GLUCOSE mg/dL 301* 83 113*   CALCIUM mg/dL 7.6* 7.8* 7.9*     Results from last 7 days   Lab Units 07/29/22  0516 07/28/22  0450 07/27/22  1232   TROPONIN T ng/mL 0.185* 0.257* 0.235*     Results from last 7 days   Lab Units 07/28/22  0450 07/27/22  1232   WBC 10*3/mm3 16.18* 18.02*   HEMOGLOBIN g/dL 7.9* 8.2*   HEMATOCRIT % 25.0* 27.5*   PLATELETS 10*3/mm3 297 299                       I reviewed the patient's new clinical results.  I personally viewed and interpreted the patient's  EKG/Telemetry data        Medication Review:   albuterol, 2.5 mg, Nebulization, 4x Daily - RT  amLODIPine, 5 mg, Oral, BID  aspirin, 81 mg, Oral, Daily  cloNIDine, 0.1 mg, Oral, Q8H  epoetin brooke-epbx, 10,000 Units, Subcutaneous, Once per day on Mon Wed Fri  folic acid, 1 mg, Oral, Daily  gabapentin, 100 mg, Oral, Daily With Lunch  hydrALAZINE, 100 mg, Oral, Q8H  insulin glargine, 10 Units, Subcutaneous, Nightly  insulin lispro, 0-14 Units, Subcutaneous, TID AC  levothyroxine, 200 mcg, Oral, Q AM  lidocaine, 1 patch, Transdermal, Q24H  losartan, 100 mg, Oral, Q24H  miconazole, 1 application, Topical, Q12H  pantoprazole, 40 mg, Oral, Daily  [START ON 8/5/2022] predniSONE, 5 mg, Oral, Daily With Breakfast  predniSONE, 7.5 mg, Oral, Daily With Breakfast  rOPINIRole, 0.75 mg, Oral, Nightly  senna-docusate sodium, 2 tablet, Oral, BID  sertraline, 150 mg, Oral, Daily  sodium chloride, 10 mL, Intravenous, Q12H  spironolactone, 12.5 mg, Oral, Daily  tamsulosin, 0.4 mg, Oral, Daily             Assessment & Plan       Metabolic encephalopathy    Hypothyroidism    Type 2 diabetes mellitus with kidney complication, with long-term current use of insulin ([facility])    Rheumatoid arthritis ([facility])    Esophageal dysmotility    ESRD (end stage renal disease) ([facility])    Chronic diastolic CHF (congestive heart failure) ([facility])    Anemia due to chronic kidney disease, on chronic dialysis ([facility])    Hyponatremia    History of stroke- R MCA s/p TPA with subsequent ICH with debility    Heart murmur    Bradycardia    1.  Elevated troponin.  No anginal symptoms but has multiple risk factors for coronary disease including diabetes.  Troponin is trending down and echo normal in 5/2022  2.  Hypertension.  on amlodipine 5 twice daily and decrease clonidine 0.1 mg every 8 hours.  If she is hypertensive in the meanwhile can add as needed clonidine 0.1 mg on an as needed basis - would like to try to decrease and wean off clonidine.   3.  Bradycardia, as above.   Asymptomatic as above.  4.  End-stage renal disease, on HD  5.  Anemia - stable, repeat H/H  6.  Diabetes  7.  Encephalopathy, improved with dialysis  8.  Chronic hyponatremia  9.  Moderate to large posteriorly situated pericardial effusion  10.  Severe tricuspid insufficiency with pulmonary hypertension.    Reviewed noncontrasted CT chest done 7/10/2022.  Left main, proximal LAD to mid LAD widely patent, proximal circumflex patent, RCA not well visualized, minimal calcification noted throughout the descending thoracic aorta nothing in the arch.  No obvious calcification noted of the RCA on CT done 6/2/2022 or 7/10/2022 but the RCA is not well visualized.    Cancel stress -  repeat H/H, consult EP -bradycardia with exertional dyspnea, significant fatigue and uncontrolled hypertension on multiple medications-unable to use rate lowering medications due to her heart rate and her heart rate may be driving some of her dyspnea.    Her dyspnea is likely multifactorial but I am concerned it may be driven by her hemoglobin, diastolic heart failure due to hypertension and bradycardia.  I have asked EP to see, I am not sure she would be a candidate for pacemaker-Micra which would allow us to use other medications for blood pressure.  I do not think clonidine is a great medication for her.    Will follow.  More volume likely needs more volume be removed by HD.    Ania Agrawal MD  08/01/22  08:42 EDT

## 2022-08-01 NOTE — SIGNIFICANT NOTE
08/01/22 1303   OTHER   Discipline physical therapist   Rehab Time/Intention   Session Not Performed patient unavailable for treatment  (Checked on patient once in AM and once in PM. Patient off floor at nuclear med and now at dialysis. Will follow up tomorrow.)   Therapy Assessment/Plan (PT)   Criteria for Skilled Interventions Met (PT) skilled treatment is necessary   Recommendation   PT - Next Appointment 08/02/22

## 2022-08-01 NOTE — CONSULTS
Electrophysiology Hospital Consult            Patient Name: Zaina Martinez  Age/Sex: 56 y.o. female  : 1965  MRN: [id number]    Date of Admission: 2022  Date of Encounter Visit: 22  Encounter Provider: RIKA Carrasco  Referring Provider: Eugenia Rodriguez, *  Place of Service: [facility] CARDIOLOGY  Patient Care Team:  Karson Oreilly MD as PCP - General (Family Medicine)      Subjective:   EP Consultation for: Bradycardia     Chief Complaint: Fatigue, generalized weakness, bradycardia    History of Present Illness:  Zaina Martinez is a 56 y.o. female with a history of CVA, CAD, GERD, HTN, and ESRD (on hemodialysis).     Patient was diagnosed with CKD and started on dialysis in 2021.  She was recently in the hospital in April for MRSA of her dialysis catheter tip and encephalopathy.  During this admission she had left sided hemiparesis and treated with tPA, subsequently she was found to have intracranial hemorrhage with embolic stroke prior to this.  ANJEL at that time was unremarkable.  She was in the hospital for almost 3 months and was recently discharged to a rehab facility.     She presented to [facility] ED on 2022 with complaints of decreased level of consciousness and altered mental status. She was diagnosed with metabolic encephalopathy.  She denied chest pain but troponin was noted to be elevated at 0.257 and thought to be renal insufficiency.  EKG on admission showed sinus bradycardia.              We have been asked to see her for bradycardia and consideration of pacemaker.       Dr. Arellano and I saw her this afternoon.     She says that she has been on dialysis for about a year--currently has right sided tunnel catheter for access.  She does not really know why she came in this time but says that she was told she was confused.  We reviewed her telemetry and EKG.  Her average HR is 50s bpm.  When we were in the room is was anywhere  from 50-58 bpm.  She denies any chest pain, dizziness, palpitations, or syncope. She does complain of generalized weakness and some breathlessness with activity that has been ongoing.  Echo yesterday showed normal LVEF of 60-65%. Moderate to severe TVR.     Past Medical History:  Past Medical History:   Diagnosis Date   • Acute CVA (cerebrovascular accident) (Formerly [facility]) 5/1/2022   • Acute on chronic diastolic CHF (congestive heart failure) (Formerly [facility])    • CAD (coronary artery disease) 12/20/2021   • Diabetes (Formerly [facility])    • Disease of thyroid gland    • GERD (gastroesophageal reflux disease)    • Hyperlipidemia 11/30/2018   • Hypertension    • Rheumatoid arthritis (Formerly [facility])        Past Surgical History:   Procedure Laterality Date   • CHOLECYSTECTOMY WITH INTRAOPERATIVE CHOLANGIOGRAM N/A 1/10/2022    Procedure: Laparoscopic cholecystectomy with intraoperative cholangiogram;  Surgeon: Ramana Raygoza MD;  Location: [facility];  Service: General;  Laterality: N/A;   • EYE SURGERY     • HYSTERECTOMY     • INSERTION HEMODIALYSIS CATHETER N/A 12/6/2021    Procedure: HEMODIALYSIS CATHETER INSERTION;  Surgeon: Keli Salazar MD;  Location: [facility] 18/19;  Service: Vascular;  Laterality: N/A;   • INSERTION HEMODIALYSIS CATHETER N/A 5/3/2022    Procedure: TUNNELED CATHETER PLACEMENT;  Surgeon: Keli Salazar MD;  Location: [facility];  Service: Vascular;  Laterality: N/A;   • MUSCLE BIOPSY Left 6/15/2022    Procedure: Left quadriceps muscle biopsy;  Surgeon: Marques Ma MD;  Location: [facility];  Service: Neurosurgery;  Laterality: Left;       Home Medications:   Medications Prior to Admission   Medication Sig Dispense Refill Last Dose   • acetaminophen (TYLENOL) 500 MG tablet Take 1 tablet by mouth Every 6 (Six) Hours As Needed for Mild Pain .      • aspirin 81 MG EC tablet Take 1 tablet by mouth Daily. 90 tablet 1    • b complex-vitamin c-folic acid (NEPHRO-ASHLEY) 0.8 MG tablet tablet Take 1 tablet by  mouth Daily. 90 tablet 0    • carvedilol (COREG) 25 MG tablet Take 1 tablet by mouth 2 (Two) Times a Day With Meals. 60 tablet 1    • cloNIDine (CATAPRES) 0.3 MG tablet Take 1 tablet by mouth Every 8 (Eight) Hours. 90 tablet 0    • epoetin brooke-epbx (RETACRIT) 39458 UNIT/ML injection Inject 1 mL under the skin into the appropriate area as directed 3 (Three) Times a Week. Indications: ESRD on Dialysis (Patient taking differently: Inject 10,000 Units under the skin into the appropriate area as directed 3 (Three) Times a Week. Monday, Wednesday, and Friday  Indications: ESRD on Dialysis) 6.6 mL     • folic acid (FOLVITE) 1 MG tablet Take 1 mg by mouth Daily.      • gabapentin (NEURONTIN) 100 MG capsule Take one tab po in afternoon daily (Patient taking differently: Take 100 mg by mouth Daily With Lunch. Take one tab po in afternoon daily) 30 capsule 1    • gabapentin (NEURONTIN) 300 MG capsule Take 1 capsule by mouth 2 (Two) Times a Day. (Patient taking differently: Take 300 mg by mouth 2 (Two) Times a Day. Morning and Night) 60 capsule 1    • hydrALAZINE (APRESOLINE) 10 MG tablet Take 1 tablet by mouth Every 8 (Eight) Hours As Needed (SBP > 160. Do not give on hemodialysis days.). 15 tablet 1    • hydrALAZINE (APRESOLINE) 100 MG tablet Take 1 tablet by mouth Every 8 (Eight) Hours. 90 tablet 1    • insulin glargine (LANTUS, SEMGLEE) 100 UNIT/ML injection Inject 10 Units under the skin into the appropriate area as directed Every 12 (Twelve) Hours.  12    • insulin lispro (ADMELOG) 100 UNIT/ML injection Inject 0-14 Units under the skin into the appropriate area as directed 3 (Three) Times a Day Before Meals. (Patient taking differently: Inject 0-14 Units under the skin into the appropriate area as directed 3 (Three) Times a Day Before Meals. Per sliding Scale)  12    • levothyroxine (SYNTHROID, LEVOTHROID) 200 MCG tablet Take 1 tablet by mouth Every Morning. 30 tablet 0    • lidocaine (LIDODERM) 5 % Place 1 patch on the  skin as directed by provider Daily. 4% patch - over the counter - Remove & Discard patch within 12 hours or as directed by MD (Patient taking differently: Place 1 patch on the skin as directed by provider Daily.)      • losartan (COZAAR) 100 MG tablet Take 1 tablet by mouth Daily. 30 tablet 1    • miconazole (MICOTIN) 2 % cream Apply 1 application topically to the appropriate area as directed Every 12 (Twelve) Hours. (Patient taking differently: Apply 1 application topically to the appropriate area as directed Every 12 (Twelve) Hours. Lower Back Rash)      • oxyCODONE (ROXICODONE) 5 MG immediate release tablet Take 1 tablet by mouth Every 4 (Four) Hours As Needed for Severe Pain  for up to 60 doses. Taper off over time (Patient taking differently: Take 5 mg by mouth Every 4 (Four) Hours As Needed for Severe Pain .) 60 tablet 0    • pantoprazole (PROTONIX) 40 MG EC tablet Take 1 tablet by mouth Daily. 90 tablet 0    • polyethylene glycol (MIRALAX) 17 g packet Take 17 g by mouth Daily As Needed (constipation).      • predniSONE (DELTASONE) 5 MG tablet Two tabs po with breakfast July 26-July 28, 1.5 tabs with breakfast July 29-August 4, then 1 tab daily starting Friday August 5, 2022 and continue on that dose (Patient taking differently: Take 5 mg by mouth See Admin Instructions. Two tabs po with breakfast July 26-July 28, 1.5 tabs with breakfast July 29-August 4, then 1 tab daily starting Friday August 5, 2022 and continue on that dose) 35 tablet 1    • rOPINIRole (REQUIP) 0.25 MG tablet Take 0.75 mg by mouth every night at bedtime.      • sertraline (ZOLOFT) 50 MG tablet Take 3 tablets by mouth Daily. 90 tablet 1    • sodium zirconium cyclosilicate (LOKELMA) 5 g pack Take 5 g by mouth Daily. 30 each 0    • spironolactone (ALDACTONE) 100 MG tablet Take 1 tablet by mouth Daily. 30 tablet 0    • tamsulosin (FLOMAX) 0.4 MG capsule 24 hr capsule Take 1 capsule by mouth Daily.      • vitamin D3 125 MCG (5000 UT) capsule  capsule Take 2,000 Units by mouth Daily.      • zolpidem (AMBIEN) 5 MG tablet Take 0.5 tablets by mouth At Night As Needed for Sleep for up to 30 days. 15 tablet 0        Allergies:  Allergies   Allergen Reactions   • Contrast Dye Confusion   • Ibuprofen Other (See Comments)     Kidney disease   • Prochlorperazine Other (See Comments)     Dystonic reaction            Past Social History:  Social History     Socioeconomic History   • Marital status:    Tobacco Use   • Smoking status: Never Smoker   • Smokeless tobacco: Never Used   Vaping Use   • Vaping Use: Never used   Substance and Sexual Activity   • Alcohol use: Never   • Drug use: Never   • Sexual activity: Defer       Past Family History:  Family History   Problem Relation Age of Onset   • Malig Hyperthermia Neg Hx        Review of Systems: All systems reviewed. Pertinent positives identified in HPI. All other systems are negative.     14 point ROS was performed and is negative except as outlined in HPI.     Objective:     Objective:  Vital Signs (last 24 hours)       07/31 0700  08/01 0659 08/01 0700  08/01 1106   Most Recent      Temp (°F) 96.8 -  98.6      98.6     98.6 (37) 08/01 0705    Heart Rate 44 -  55    54 -  55     54 08/01 1101    Resp   16    18 -  20     18 08/01 1101    /58 -  181/81    166/73 -  174/75     174/75 08/01 1012    SpO2 (%) 93 -  97      94     94 08/01 1101        Temp:  [96.8 °F (36 °C)-98.6 °F (37 °C)] 98.6 °F (37 °C)  Heart Rate:  [44-55] 54  Resp:  [16-20] 18  BP: (113-180)/(58-85) 174/75  Body mass index is 27.54 kg/m².        Physical Exam:     General Appearance: No acute distress, well developed and well nourished.   Eyes: Conjunctiva and lids: No erythema, swelling, or discharge. Sclera non-icteric.   HENT: Atraumatic, normocephalic. External eyes, ears, and nose normal.   Respiratory: No signs of respiratory distress. Respiration rhythm and depth normal.   Clear to auscultation. No rales, crackles, rhonchi,  or wheezing auscultated.   Cardiovascular:  Heart Rate and Rhythm: bradycardic, Heart Sounds: Normal S1 and S2. No S3 or S4 noted  Gastrointestinal:  Abdomen soft, non-distended, non-tender.  Musculoskeletal: Normal movement of extremities  Skin: Warm and dry.   Psychiatric: Patient alert and oriented to person, place, and time. Speech and behavior appropriate. Normal mood and affect.    Labs:   Lab Review:     Results from last 7 days   Lab Units 08/01/22  0549 07/31/22  0541 07/28/22  0450 07/27/22  1232   SODIUM mmol/L 129* 134* 137 128*   POTASSIUM mmol/L 4.7 4.0 3.5 5.0   CHLORIDE mmol/L 95* 99 98 90*   CO2 mmol/L 21.0* 26.0 27.5 21.4*   BUN mg/dL 28* 23* 18 43*   CREATININE mg/dL 3.63* 2.76* 2.36* 4.33*   GLUCOSE mg/dL 301* 83 113* 459*   CALCIUM mg/dL 7.6* 7.8* 7.9* 8.1*   AST (SGOT) U/L  --   --  27 22   ALT (SGPT) U/L  --   --  15 13     Results from last 7 days   Lab Units 07/29/22  0516 07/28/22  0450 07/27/22  1232   TROPONIN T ng/mL 0.185* 0.257* 0.235*     Results from last 7 days   Lab Units 07/28/22  0450   WBC 10*3/mm3 16.18*   HEMOGLOBIN g/dL 7.9*   HEMATOCRIT % 25.0*   PLATELETS 10*3/mm3 297                                       Imaging:   Echo   · Left ventricular ejection fraction appears to be 61 - 65%. Left ventricular systolic function is normal.  · Left ventricular wall thickness is consistent with mild concentric hypertrophy.  · Left ventricular diastolic function is consistent with (grade II w/high LAP) pseudonormalization  · Normal right ventricular cavity size and systolic function noted.  · The left atrial cavity is moderately dilated.  · There is mild to moderate mitral annular calcification  · Mild to moderate mitral valve regurgitation is present.  · Moderate to severe tricuspid valve regurgitation is present  · There is a circumferential pericardial effusion which is moderate to large posteriorly and small anteriorly. There is no tamponade physiology          EKG:               I  personally viewed and interpreted the patient's EKG/Telemetry tracings.    Assessment:       Metabolic encephalopathy    Hypothyroidism    Type 2 diabetes mellitus with kidney complication, with long-term current use of insulin (HCC)    Rheumatoid arthritis (HCC)    Esophageal dysmotility    ESRD (end stage renal disease) (HCC)    Chronic diastolic CHF (congestive heart failure) (HCC)    Anemia due to chronic kidney disease, on chronic dialysis (HCC)    Hyponatremia    History of stroke- R MCA s/p TPA with subsequent ICH with debility    Heart murmur    Bradycardia        Plan:   Dr. Lyons and I saw this patient.        She has ESRD and currently getting dialysis MWF with a right sided tunnel catheter, with this in mind a tranvenous pacemaker would not be a good option and possibly pose significant risk (infection, etc).      She did mention a leadless device which would be appropriate for her given her history of dialysis, however AV synchrony would be suboptimal given her sinus bradycardia, this combined with the fact that she would be 100% RV paced could potentially lead to pacemaker induced cardiomyopathy and other complications.      With that being said it is not even clear that her bradycardia is causing her weakness and breathless, as she has many other health problems.  We do not recommend a pacemaker at this time, there is more risk than benefit at this point.           Thank you for allowing me to participate in the care of Zaina Martinez. Feel free to contact me directly with any further questions or concerns.    RIKA Carrasco  [facility]  08/01/22  11:06 EDT

## 2022-08-02 LAB
BASOPHILS # BLD AUTO: 0.11 10*3/MM3 (ref 0–0.2)
BASOPHILS NFR BLD AUTO: 0.8 % (ref 0–1.5)
DEPRECATED RDW RBC AUTO: 51.5 FL (ref 37–54)
EOSINOPHIL # BLD AUTO: 0.08 10*3/MM3 (ref 0–0.4)
EOSINOPHIL NFR BLD AUTO: 0.6 % (ref 0.3–6.2)
ERYTHROCYTE [DISTWIDTH] IN BLOOD BY AUTOMATED COUNT: 16.6 % (ref 12.3–15.4)
GLUCOSE BLDC GLUCOMTR-MCNC: 172 MG/DL (ref 70–130)
GLUCOSE BLDC GLUCOMTR-MCNC: 216 MG/DL (ref 70–130)
GLUCOSE BLDC GLUCOMTR-MCNC: 251 MG/DL (ref 70–130)
GLUCOSE BLDC GLUCOMTR-MCNC: 370 MG/DL (ref 70–130)
HCT VFR BLD AUTO: 29.6 % (ref 34–46.6)
HGB BLD-MCNC: 8.6 G/DL (ref 12–15.9)
IMM GRANULOCYTES # BLD AUTO: 0.38 10*3/MM3 (ref 0–0.05)
IMM GRANULOCYTES NFR BLD AUTO: 2.7 % (ref 0–0.5)
LYMPHOCYTES # BLD AUTO: 1.81 10*3/MM3 (ref 0.7–3.1)
LYMPHOCYTES NFR BLD AUTO: 12.6 % (ref 19.6–45.3)
MCH RBC QN AUTO: 25.2 PG (ref 26.6–33)
MCHC RBC AUTO-ENTMCNC: 29.1 G/DL (ref 31.5–35.7)
MCV RBC AUTO: 86.8 FL (ref 79–97)
MONOCYTES # BLD AUTO: 1.3 10*3/MM3 (ref 0.1–0.9)
MONOCYTES NFR BLD AUTO: 9.1 % (ref 5–12)
NEUTROPHILS NFR BLD AUTO: 10.65 10*3/MM3 (ref 1.7–7)
NEUTROPHILS NFR BLD AUTO: 74.2 % (ref 42.7–76)
NRBC BLD AUTO-RTO: 0 /100 WBC (ref 0–0.2)
PLATELET # BLD AUTO: 317 10*3/MM3 (ref 140–450)
PMV BLD AUTO: 10.5 FL (ref 6–12)
RBC # BLD AUTO: 3.41 10*6/MM3 (ref 3.77–5.28)
WBC NRBC COR # BLD: 14.33 10*3/MM3 (ref 3.4–10.8)

## 2022-08-02 PROCEDURE — 97530 THERAPEUTIC ACTIVITIES: CPT

## 2022-08-02 PROCEDURE — G0378 HOSPITAL OBSERVATION PER HR: HCPCS

## 2022-08-02 PROCEDURE — 99232 SBSQ HOSP IP/OBS MODERATE 35: CPT | Performed by: INTERNAL MEDICINE

## 2022-08-02 PROCEDURE — 63710000001 PREDNISONE PER 5 MG: Performed by: INTERNAL MEDICINE

## 2022-08-02 PROCEDURE — 94799 UNLISTED PULMONARY SVC/PX: CPT

## 2022-08-02 PROCEDURE — 94664 DEMO&/EVAL PT USE INHALER: CPT

## 2022-08-02 PROCEDURE — 82962 GLUCOSE BLOOD TEST: CPT

## 2022-08-02 PROCEDURE — 94761 N-INVAS EAR/PLS OXIMETRY MLT: CPT

## 2022-08-02 PROCEDURE — 63710000001 INSULIN LISPRO (HUMAN) PER 5 UNITS: Performed by: INTERNAL MEDICINE

## 2022-08-02 PROCEDURE — 85025 COMPLETE CBC W/AUTO DIFF WBC: CPT | Performed by: INTERNAL MEDICINE

## 2022-08-02 PROCEDURE — 97110 THERAPEUTIC EXERCISES: CPT

## 2022-08-02 RX ORDER — SPIRONOLACTONE 25 MG/1
25 TABLET ORAL DAILY
Status: DISCONTINUED | OUTPATIENT
Start: 2022-08-03 | End: 2022-08-04 | Stop reason: HOSPADM

## 2022-08-02 RX ADMIN — HYDRALAZINE HYDROCHLORIDE 100 MG: 50 TABLET, FILM COATED ORAL at 14:08

## 2022-08-02 RX ADMIN — INSULIN LISPRO 3 UNITS: 100 INJECTION, SOLUTION INTRAVENOUS; SUBCUTANEOUS at 16:54

## 2022-08-02 RX ADMIN — AMLODIPINE BESYLATE 5 MG: 5 TABLET ORAL at 20:09

## 2022-08-02 RX ADMIN — GABAPENTIN 100 MG: 100 CAPSULE ORAL at 12:07

## 2022-08-02 RX ADMIN — MICONAZOLE NITRATE: 2 POWDER TOPICAL at 12:08

## 2022-08-02 RX ADMIN — Medication 10 ML: at 21:13

## 2022-08-02 RX ADMIN — PANTOPRAZOLE SODIUM 40 MG: 40 TABLET, DELAYED RELEASE ORAL at 07:30

## 2022-08-02 RX ADMIN — ASPIRIN 81 MG: 81 TABLET, COATED ORAL at 07:33

## 2022-08-02 RX ADMIN — SPIRONOLACTONE 12.5 MG: 25 TABLET, FILM COATED ORAL at 07:36

## 2022-08-02 RX ADMIN — HYDRALAZINE HYDROCHLORIDE 100 MG: 50 TABLET, FILM COATED ORAL at 06:32

## 2022-08-02 RX ADMIN — AMLODIPINE BESYLATE 5 MG: 5 TABLET ORAL at 07:36

## 2022-08-02 RX ADMIN — LEVOTHYROXINE SODIUM 200 MCG: 0.1 TABLET ORAL at 06:32

## 2022-08-02 RX ADMIN — INSULIN LISPRO 12 UNITS: 100 INJECTION, SOLUTION INTRAVENOUS; SUBCUTANEOUS at 07:29

## 2022-08-02 RX ADMIN — HYDRALAZINE HYDROCHLORIDE 100 MG: 50 TABLET, FILM COATED ORAL at 21:04

## 2022-08-02 RX ADMIN — CLONIDINE HYDROCHLORIDE 0.1 MG: 0.1 TABLET ORAL at 21:04

## 2022-08-02 RX ADMIN — OXYCODONE 5 MG: 5 TABLET ORAL at 03:59

## 2022-08-02 RX ADMIN — ROPINIROLE HYDROCHLORIDE 0.75 MG: 0.5 TABLET, FILM COATED ORAL at 20:10

## 2022-08-02 RX ADMIN — CLONIDINE HYDROCHLORIDE 0.1 MG: 0.1 TABLET ORAL at 14:09

## 2022-08-02 RX ADMIN — PREDNISONE 7.5 MG: 1 TABLET ORAL at 07:37

## 2022-08-02 RX ADMIN — TAMSULOSIN HYDROCHLORIDE 0.4 MG: 0.4 CAPSULE ORAL at 07:32

## 2022-08-02 RX ADMIN — LOSARTAN POTASSIUM 100 MG: 100 TABLET, FILM COATED ORAL at 07:35

## 2022-08-02 RX ADMIN — INSULIN GLARGINE-YFGN 10 UNITS: 100 INJECTION, SOLUTION SUBCUTANEOUS at 21:11

## 2022-08-02 RX ADMIN — FOLIC ACID 1 MG: 1 TABLET ORAL at 07:30

## 2022-08-02 RX ADMIN — MICONAZOLE NITRATE: 2 POWDER TOPICAL at 21:13

## 2022-08-02 RX ADMIN — Medication 3 MG: at 21:15

## 2022-08-02 RX ADMIN — ALBUTEROL SULFATE 2.5 MG: 2.5 SOLUTION RESPIRATORY (INHALATION) at 11:10

## 2022-08-02 RX ADMIN — INSULIN LISPRO 5 UNITS: 100 INJECTION, SOLUTION INTRAVENOUS; SUBCUTANEOUS at 12:07

## 2022-08-02 RX ADMIN — SERTRALINE 150 MG: 100 TABLET, FILM COATED ORAL at 07:30

## 2022-08-02 RX ADMIN — CLONIDINE HYDROCHLORIDE 0.1 MG: 0.1 TABLET ORAL at 06:32

## 2022-08-02 NOTE — THERAPY TREATMENT NOTE
Patient Name: Zaina Martinez  : 1965    MRN: [id number]                              Today's Date: 2022       Admit Date: 2022    Visit Dx:     ICD-10-CM ICD-9-CM   1. ESRD (end stage renal disease) ([facility])  N18.6 585.6   2. Altered mental status, unspecified altered mental status type  R41.82 780.97   3. Hyperglycemia  R73.9 790.29   4. Hyponatremia  E87.1 276.1   5. Pneumonia of left lung due to infectious organism, unspecified part of lung  J18.9 486   6. Chronic anemia  D64.9 285.9     Patient Active Problem List   Diagnosis   • Renal insufficiency   • Hypertensive disorder   • Hypothyroidism   • Type 2 diabetes mellitus with kidney complication, with long-term current use of insulin ([facility])   • Rheumatoid arthritis ([facility])   • Angioedema   • Esophageal dysmotility   • Anemia   • Medically noncompliant   • Myocardial infarction due to demand ischemia ([facility])   • Enteritis   • PRES (posterior reversible encephalopathy syndrome)   • Urine retention   • Klebsiella infection   • Superficial thrombophlebitis   • Generalized weakness   • ESRD (end stage renal disease) ([facility])   • CAD (coronary artery disease)   • Abnormal urinalysis   • Chronic diastolic CHF (congestive heart failure) ([facility])   • Pyelonephritis   • Calculus of gallbladder with acute on chronic cholecystitis without obstruction   • Pleural effusion on right   • Anemia due to chronic kidney disease, on chronic dialysis ([facility])   • Abnormal findings on diagnostic imaging of other specified body structures   • Acute upper respiratory infection   • Agitation   • Alkaline phosphatase raised   • Casts present in urine   • Cellulitis of toe   • Hip pain   • Community acquired pneumonia   • Depressive disorder   • Diarrhea of presumed infectious origin   • Difficult or painful urination   • Disease due to severe acute respiratory syndrome coronavirus 2 (SARS-CoV-2)   • Dyspnea   • Encounter for follow-up examination after completed treatment for conditions  other than malignant neoplasm   • H/O: hypothyroidism   • Hyperlipidemia   • Hypomagnesemia   • Intractable vomiting with nausea   • Leukocytosis   • Luetscher's syndrome   • Need for influenza vaccination   • Restless legs   • Noncompliance with treatment   • Shoulder pain   • Urinary tract infectious disease   • Metabolic encephalopathy   • Abnormal findings on diagnostic imaging of abdomen   • Status post cholecystectomy   • Hyponatremia   • Leukocytosis   • Acute metabolic encephalopathy   • Encephalopathy, toxic   • Acute CVA (cerebrovascular accident) (HCC)   • Intracranial hemorrhage (HCC)   • Stroke (HCC)   • Abnormal urinalysis   • Diabetic muscle infarction (HCC)   • Other insomnia   • Altered mental status   • History of stroke- R MCA s/p TPA with subsequent ICH with debility   • Heart murmur   • Bradycardia   • Left lower lobe pneumonia   • Metabolic encephalopathy     Past Medical History:   Diagnosis Date   • Acute CVA (cerebrovascular accident) (HCC) 5/1/2022   • Acute on chronic diastolic CHF (congestive heart failure) (HCC)    • CAD (coronary artery disease) 12/20/2021   • Diabetes (HCC)    • Disease of thyroid gland    • GERD (gastroesophageal reflux disease)    • Hyperlipidemia 11/30/2018   • Hypertension    • Rheumatoid arthritis (HCC)      Past Surgical History:   Procedure Laterality Date   • CHOLECYSTECTOMY WITH INTRAOPERATIVE CHOLANGIOGRAM N/A 1/10/2022    Procedure: Laparoscopic cholecystectomy with intraoperative cholangiogram;  Surgeon: Ramana Raygoza MD;  Location: [facility];  Service: General;  Laterality: N/A;   • EYE SURGERY     • HYSTERECTOMY     • INSERTION HEMODIALYSIS CATHETER N/A 12/6/2021    Procedure: HEMODIALYSIS CATHETER INSERTION;  Surgeon: Keli Salazar MD;  Location: [facility] 18/19;  Service: Vascular;  Laterality: N/A;   • INSERTION HEMODIALYSIS CATHETER N/A 5/3/2022    Procedure: TUNNELED CATHETER PLACEMENT;  Surgeon: Keli Salazar MD;   Location: [facility] MAIN OR;  Service: Vascular;  Laterality: N/A;   • MUSCLE BIOPSY Left 6/15/2022    Procedure: Left quadriceps muscle biopsy;  Surgeon: Marques Ma MD;  Location: [facility] MAIN OR;  Service: Neurosurgery;  Laterality: Left;      General Information     Row Name 08/02/22 1539          Physical Therapy Time and Intention    Document Type therapy note (daily note)  -PH     Mode of Treatment physical therapy  -PH     Row Name 08/02/22 1539          General Information    Existing Precautions/Restrictions fall  -PH     Row Name 08/02/22 1539          Safety Issues, Functional Mobility    Impairments Affecting Function (Mobility) endurance/activity tolerance;strength;balance;pain;range of motion (ROM)  -PH     Comment, Safety Issues/Impairments (Mobility) gt belt and non skid socks worn for pt safety  -PH           User Key  (r) = Recorded By, (t) = Taken By, (c) = Cosigned By    Initials Name Provider Type    PH Kristan, Daja, PTA Physical Therapist Assistant               Mobility     Row Name 08/02/22 1540          Bed Mobility    Bed Mobility supine-sit  -PH     Supine-Sit [place] (Bed Mobility) contact guard;supervision;verbal cues  -PH     Assistive Device (Bed Mobility) bed rails;head of bed elevated  -PH     Comment, (Bed Mobility) slow to EOB  -PH     Row Name 08/02/22 1540          Sit-Stand Transfer    Sit-Stand [place] (Transfers) contact guard;verbal cues;nonverbal cues (demo/gesture);2 person assist  -PH     Assistive Device (Sit-Stand Transfers) walker, front-wheeled  -PH     Row Name 08/02/22 1540          Gait/Stairs (Locomotion)    Ven[place] Level (Gait) contact guard;1 person assist;2 person assist;verbal cues  -PH     Assistive Device (Gait) walker, front-wheeled  -PH     Distance in Feet (Gait) 40'  -PH     Deviations/Abnormal Patterns (Gait) kenn decreased;gait speed decreased;stride length decreased;antalgic  -PH     Bilateral Gait Deviations forward  "flexed posture  -PH     Comment, (Gait/Stairs) very slow w/ no LOB; initially 2 person assist for pt safety then improved to 1 person assist; pt limited by activity intolerance  -PH           User Key  (r) = Recorded By, (t) = Taken By, (c) = Cosigned By    Initials Name Provider Type     Daja Rushing PTA Physical Therapist Assistant               Obj/Interventions     Row Name 08/02/22 1544          Motor Skills    Therapeutic Exercise other (see comments)  BAP, LAQ, seated march; x 10 reps each - very slow  -PH     Row Name 08/02/22 1544          Balance    Balance Assessment sitting static balance;standing static balance  -PH     Static Sitting Balance standby assist  -PH     Static Standing Balance contact guard  -PH     Position/Device Used, Standing Balance walker, front-wheeled  -PH           User Key  (r) = Recorded By, (t) = Taken By, (c) = Cosigned By    Initials Name Provider Type     Daja Rushing PTA Physical Therapist Assistant               Goals/Plan    No documentation.                Clinical Impression     Row Name 08/02/22 1544          Pain    Pre/Posttreatment Pain Comment pt reports \"a little pain\" in R LE  -PH     Pain Intervention(s) Repositioned;Ambulation/increased activity;Rest  -PH     Additional Documentation Pain Scale: Numbers Pre/Post-Treatment (Group)  -PH     Row Name 08/02/22 1540          Plan of Care Review    Plan of Care Reviewed With patient  -PH     Progress improving  -PH     Outcome Evaluation Pt in bed and agreeable to PT today. Pt sat up to EOB w/ improved CGA/SV and extra time. Pt stood then amb 40' very slowly req CGA of 2 w/ use of fww initially then improved to CGA x 1 w/ use of fww. Pt performed BLE ther ex in chair. RN made aware that batteries were dead in box of chair alarm. When asked, pt w/ call light and agreeable to calling for assist before getting out of chair. PT will prog as pt iraida.  -PH     Row Name 08/02/22 3013          " Positioning and Restraints    Pre-Treatment Position in bed  -PH     Post Treatment Position chair  -PH     In Chair reclined;call light within reach;encouraged to call for assist;exit alarm on;notified nsg  -PH           User Key  (r) = Recorded By, (t) = Taken By, (c) = Cosigned By    Initials Name Provider Type    Daja Whelan PTA Physical Therapist Assistant               Outcome Measures     Row Name 08/02/22 1548          How much help from another person do you currently need...    Turning from your back to your side while in flat bed without using bedrails? 3  -PH     Moving from lying on back to sitting on the side of a flat bed without bedrails? 3  -PH     Moving to and from a bed to a chair (including a wheelchair)? 3  -PH     Standing up from a chair using your arms (e.g., wheelchair, bedside chair)? 3  -PH     Climbing 3-5 steps with a railing? 2  -PH     To walk in hospital room? 3  -PH     AM-PAC 6 Clicks Score (PT) 17  -PH     Highest level of mobility 5 --> Static standing  -PH     Row Name 08/02/22 1548          Functional Assessment    Outcome Measure Options AM-PAC 6 Clicks Basic Mobility (PT)  -PH           User Key  (r) = Recorded By, (t) = Taken By, (c) = Cosigned By    Initials Name Provider Type     Daja Rushing PTA Physical Therapist Assistant                             Physical Therapy Education                 Title: PT OT SLP Therapies (Done)     Topic: Physical Therapy (Done)     Point: Mobility training (Done)     Learning Progress Summary           Patient Acceptance, E,D, VU by PH at 8/2/2022 1548    Acceptance, E,D, VU,NR by MS at 7/29/2022 1528    Acceptance, E,D, VU,NR by MS at 7/28/2022 1106                   Point: Home exercise program (Done)     Learning Progress Summary           Patient Acceptance, E,D, VU by PH at 8/2/2022 1548    Acceptance, E,D, VU,NR by MS at 7/29/2022 1528    Acceptance, E,D, VU,NR by MS at 7/28/2022 1106                    Point: Body mechanics (Done)     Learning Progress Summary           Patient Acceptance, E,D, VU by  at 8/2/2022 1548    Acceptance, E,D, VU,NR by MS at 7/29/2022 1528    Acceptance, E,D, VU,NR by MS at 7/28/2022 1106                   Point: Precautions (Done)     Learning Progress Summary           Patient Acceptance, E,D, VU by  at 8/2/2022 1548    Acceptance, E,D, VU,NR by MS at 7/29/2022 1528    Acceptance, E,D, VU,NR by MS at 7/28/2022 1106                               User Key     Initials Effective Dates Name Provider Type Discipline    MS 06/16/21 -  Mat Baptiste PT Physical Therapist PT     06/16/21 -  Daja Rushing PTA Physical Therapist Assistant PT              PT Recommendation and Plan     Plan of Care Reviewed With: patient  Progress: improving  Outcome Evaluation: Pt in bed and agreeable to PT today. Pt sat up to EOB w/ improved CGA/SV and extra time. Pt stood then amb 40' very slowly req CGA of 2 w/ use of fww initially then improved to CGA x 1 w/ use of fww. Pt performed BLE ther ex in chair. RN made aware that batteries were dead in box of chair alarm. When asked, pt w/ call light and agreeable to calling for assist before getting out of chair. PT will prog as pt iraida.     Time Calculation:    PT Charges     Row Name 08/02/22 1549             Time Calculation    Start Time 1439  -PH      Stop Time 1507  -PH      Time Calculation (min) 28 min  -PH      PT Received On 08/02/22  -PH      PT - Next Appointment 08/03/22  -PH              Timed Charges    45252 - PT Therapeutic Exercise Minutes 8  -PH      68898 - PT Therapeutic Activity Minutes 20  -PH              Total Minutes    Timed Charges Total Minutes 28  -PH       Total Minutes 28  -PH            User Key  (r) = Recorded By, (t) = Taken By, (c) = Cosigned By    Initials Name Provider Type    PH Daja Rushing PTA Physical Therapist Assistant              Therapy Charges for Today     Code Description Service  Date Service Provider Modifiers Qty    19408757419  PT THER PROC EA 15 MIN 8/2/2022 VenkatdarwinDaja bustillos, PTA GP 1    [id number]  PT THERAPEUTIC ACT EA 15 MIN 8/2/2022 VenkatdarwinCarla bustillosDaja, PTA GP 1    [id number]  PT THER SUPP EA 15 MIN 8/2/2022 VenkatdarwinDaja bustillos, PTA GP 1          PT G-Codes  Outcome Measure Options: AM-PAC 6 Clicks Basic Mobility (PT)  AM-PAC 6 Clicks Score (PT): 17  AM-PAC 6 Clicks Score (OT): 12  Modified Iza Scale: 4 - Moderately severe disability.  Unable to walk without assistance, and unable to attend to own bodily needs without assistance.    Daja Rushing PTA  8/2/2022

## 2022-08-02 NOTE — PLAN OF CARE
Goal Outcome Evaluation:  Plan of Care Reviewed With: patient        Progress: improving  Outcome Evaluation: Pt in bed and agreeable to PT today. Pt sat up to EOB w/ improved CGA/SV and extra time. Pt stood then amb 40' very slowly req CGA of 2 w/ use of fww initially then improved to CGA x 1 w/ use of fww. Pt performed BLE ther ex in chair. RN made aware that batteries were dead in box of chair alarm. When asked, pt w/ call light and agreeable to calling for assist before getting out of chair. PT will prog as pt iraida.    Patient was not wearing a face mask during this therapy encounter. Therapist used appropriate personal protective equipment including gown, mask and gloves.  Mask used was standard procedure mask. Appropriate PPE was worn during the entire therapy session. Hand hygiene was completed before and after therapy session. Patient is not in enhanced droplet precautions.  PT tech: Kaveh BAL

## 2022-08-02 NOTE — PROGRESS NOTES
[facility] Acute Rehab  Pt was on Acute Rehab from 5/13-7/22 then dc'd to subacute rehab. Discussed with Dr. Florentino. At this point we would recommend subacute rehab.   CCP informed- msg left  Will sign off    Silvia Vázquez RN  Acute Rehab Admission Nurse

## 2022-08-02 NOTE — PROGRESS NOTES
[facility] Progress Note      Patient Name: Zaina Martinez  : 1965  MRN: [id number]  Primary Care Physician:  Karson Oreilly MD  Date of admission: 2022    Subjective     Interval History:     Seen and examined.  Denies shortness of air.  Feeling stronger.  Denies any complaints.  On 2 L nasal cannula.  Review of Systems:   As noted above    Objective     Vitals:   Temp:  [97.8 °F (36.6 °C)-98.2 °F (36.8 °C)] 97.8 °F (36.6 °C)  Heart Rate:  [54-62] 56  Resp:  [18] 18  BP: (112-186)/(51-79) 168/70  Flow (L/min):  [1-2] 1    Intake/Output Summary (Last 24 hours) at 2022 0920  Last data filed at 2022 1557  Gross per 24 hour   Intake --   Output 4000 ml   Net -4000 ml       Physical Exam:    General Appearance: alert, oriented x 3, NAD, chronically ill  Skin: warm and dry  HEENT: oral mucosa normal, nonicteric sclera  Neck: supple, no JVD  Lungs: CTA, not labored on RA  Heart: RR, bradycardic, normal S1 and S2  Abdomen: soft, nontender, nondistended, BS +  : no palpable bladder  Extremities: no edema, cyanosis or clubbing  Vascular: Right chest TDC  Neuro: normal speech and mental status     Scheduled Meds:     albuterol, 2.5 mg, Nebulization, 4x Daily - RT  amLODIPine, 5 mg, Oral, BID  aspirin, 81 mg, Oral, Daily  cloNIDine, 0.1 mg, Oral, Q8H  epoetin brooke-epbx, 10,000 Units, Subcutaneous, Once per day on   folic acid, 1 mg, Oral, Daily  gabapentin, 100 mg, Oral, Daily With Lunch  hydrALAZINE, 100 mg, Oral, Q8H  insulin glargine, 10 Units, Subcutaneous, Nightly  insulin lispro, 0-14 Units, Subcutaneous, TID AC  levothyroxine, 200 mcg, Oral, Q AM  lidocaine, 1 patch, Transdermal, Q24H  losartan, 100 mg, Oral, Q24H  miconazole, 1 application, Topical, Q12H  pantoprazole, 40 mg, Oral, Daily  [START ON 2022] predniSONE, 5 mg, Oral, Daily With Breakfast  predniSONE, 7.5 mg, Oral, Daily With Breakfast  rOPINIRole, 0.75 mg, Oral, Nightly  senna-docusate  sodium, 2 tablet, Oral, BID  sertraline, 150 mg, Oral, Daily  sodium chloride, 10 mL, Intravenous, Q12H  spironolactone, 12.5 mg, Oral, Daily  tamsulosin, 0.4 mg, Oral, Daily      IV Meds:        Results Reviewed:   I have personally reviewed the results from the time of this admission to 8/2/2022 09:20 EDT     Results from last 7 days   Lab Units 08/01/22  0549 07/31/22  0541 07/28/22  0450 07/27/22  1232   SODIUM mmol/L 129* 134* 137 128*   POTASSIUM mmol/L 4.7 4.0 3.5 5.0   CHLORIDE mmol/L 95* 99 98 90*   CO2 mmol/L 21.0* 26.0 27.5 21.4*   BUN mg/dL 28* 23* 18 43*   CREATININE mg/dL 3.63* 2.76* 2.36* 4.33*   CALCIUM mg/dL 7.6* 7.8* 7.9* 8.1*   BILIRUBIN mg/dL  --   --  0.6 0.5   ALK PHOS U/L  --   --  236* 283*   ALT (SGPT) U/L  --   --  15 13   AST (SGOT) U/L  --   --  27 22   GLUCOSE mg/dL 301* 83 113* 459*     Estimated Creatinine Clearance: 15.4 mL/min (A) (by C-G formula based on SCr of 3.63 mg/dL (H)).  Results from last 7 days   Lab Units 08/01/22  0549 07/31/22  0541   PHOSPHORUS mg/dL 3.5 2.9         Results from last 7 days   Lab Units 08/02/22  0442 08/01/22  1748 07/28/22  0450 07/27/22  1232   WBC 10*3/mm3 14.33* 14.32* 16.18* 18.02*   HEMOGLOBIN g/dL 8.6* 8.9* 7.9* 8.2*   PLATELETS 10*3/mm3 317 312 297 299           Assessment / Plan     ASSESSMENT:  1.  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 HD MWF, with stable volume and electrolytes  2.  Intracerebral bleed and altered mental status, with latter resolving. CT head showed no new findings  3.  Myoclonic jerks: resolved  4.  DM2, with labile readings  5.  Coronary artery disease  6.  Acute on chronic diastolic dysfunction .   7.  Anemia of CKD, on long-acting ANDRAE as an outpatient.    8.  Hypertension, fairly well controlled  9.  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 On ASA.      PLAN:    1.  HD tomorrow (MWF) with ongoing lowering of DW.    2.   surveillance labs      Thank you for involving us in the care of Zaina  RAJ Martinez.  Please feel free to call with any questions.    Ruthann Palmer MD  08/02/22  09:20 EDT    [facility]  [phone]

## 2022-08-02 NOTE — PLAN OF CARE
Problem: Adult Inpatient Plan of Care  Goal: Plan of Care Review  Outcome: Ongoing, Not Progressing  Flowsheets (Taken 8/2/2022 1514)  Plan of Care Reviewed With: patient  Outcome Evaluation: Patient is Aand Ox4, no pain till this time today reported, on room air now with stable [id number], worked with PT and sat oob to chair, watched tv and reported feeling much better today.     Problem: Adult Inpatient Plan of Care  Goal: Plan of Care Review  Outcome: Ongoing, Not Progressing  Flowsheets (Taken 8/2/2022 1514)  Plan of Care Reviewed With: patient  Outcome Evaluation: Patient is Aand Ox4, no pain till this time today reported, on room air now with stable [id number], worked with PT and sat oob to chair, watched tv and reported feeling much better today.   Goal Outcome Evaluation:  Plan of Care Reviewed With: patient           Outcome Evaluation: Patient is Aand Ox4, no pain till this time today reported, on room air now with stable [id number], worked with PT and sat oob to chair, watched tv and reported feeling much better today.

## 2022-08-02 NOTE — CASE MANAGEMENT/SOCIAL WORK
Continued Stay Note  [facility]     Patient Name: Zaina Martinez  MRN: [id number]  Today's Date: 8/2/2022    Admit Date: 7/27/2022     Discharge Plan     Row Name 08/02/22 1012       Plan    Plan Rehab - Acute vs Subacute; would need [facility] pre-cert    Patient/Family in Agreement with Plan yes    Provided Post Acute Provider List? Yes    Plan Comments Spoke with pt via telephone due to isolation status. Introduced self and explained CCP role. Discussed dc planning and pt wishes to go to rehab at [facility]. CCP discussed onsite HD SNF facilities, as [facility], [facility], [facility], [facility] and [place]. She ask for referral to [facility], [place], operated [facility] acute rehab as she was recently there. CCP discussed criteria and pre-cert process but would make referral. CCP discussed subacute rehab and she states she would like referral to [facility] SNF and her spouse will transport to and from . Backup to that would be referral to [facility] with onsite HD. CCP spoke with [facility] and she states they do not consider pt leaving [facility]. Referral placed to [facility] [place] acute rehab, [place] and [place]/[facility]. GEMMA oscar/ccp               Discharge Codes    No documentation.               Expected Discharge Date and Time     Expected Discharge Date Expected Discharge Time    Aug 4, 2022             Guillermina Duffy RN

## 2022-08-02 NOTE — PROGRESS NOTES
LOS: 1 day   Patient Care Team:  Karson Oreilly MD as PCP - General (Family Medicine)    Chief Complaint:     Follow-up bradycardia and hypertension    Interval History:     She has no dizziness.  Her breathing is slightly better.  She is fairly fatigued today because she had a big day yesterday.  She denies chest pain or nausea.      Objective   Vital Signs  Temp:  [97.8 °F (36.6 °C)-98.2 °F (36.8 °C)] 97.8 °F (36.6 °C)  Heart Rate:  [54-62] 56  Resp:  [18] 18  BP: (112-186)/(51-79) 168/70    Intake/Output Summary (Last 24 hours) at 8/2/2022 1047  Last data filed at 8/2/2022 0743  Gross per 24 hour   Intake 240 ml   Output 4000 ml   Net -3760 ml       Comfortable NAD  Neck supple, no JVD or thyromegaly appreciated  S1/S2 RRR, no m/r/g  Lungs CTA B, normal effort  Abdomen S/NT/ND (+) BS, no HSM appreciated  Extremities warm, no clubbing, cyanosis, or edema  No visible or palpable skin lesions  A/Ox4, mood and affect appropriate    Results Review:      Results from last 7 days   Lab Units 08/01/22  0549 07/31/22  0541 07/28/22  0450   SODIUM mmol/L 129* 134* 137   POTASSIUM mmol/L 4.7 4.0 3.5   CHLORIDE mmol/L 95* 99 98   CO2 mmol/L 21.0* 26.0 27.5   BUN mg/dL 28* 23* 18   CREATININE mg/dL 3.63* 2.76* 2.36*   GLUCOSE mg/dL 301* 83 113*   CALCIUM mg/dL 7.6* 7.8* 7.9*     Results from last 7 days   Lab Units 07/29/22  0516 07/28/22  0450 07/27/22  1232   TROPONIN T ng/mL 0.185* 0.257* 0.235*     Results from last 7 days   Lab Units 08/02/22  0442 08/01/22  1748 07/28/22  0450   WBC 10*3/mm3 14.33* 14.32* 16.18*   HEMOGLOBIN g/dL 8.6* 8.9* 7.9*   HEMATOCRIT % 29.6* 29.5* 25.0*   PLATELETS 10*3/mm3 317 312 297                       I reviewed the patient's new clinical results.  I personally viewed and interpreted the patient's EKG/Telemetry data        Medication Review:   albuterol, 2.5 mg, Nebulization, 4x Daily - RT  amLODIPine, 5 mg, Oral, BID  aspirin, 81 mg, Oral, Daily  cloNIDine, 0.1 mg, Oral, Q8H  epoetin  brooke-epbx, 10,000 Units, Subcutaneous, Once per day on Mon Wed Fri  folic acid, 1 mg, Oral, Daily  gabapentin, 100 mg, Oral, Daily With Lunch  hydrALAZINE, 100 mg, Oral, Q8H  insulin glargine, 10 Units, Subcutaneous, Nightly  insulin lispro, 0-14 Units, Subcutaneous, TID AC  levothyroxine, 200 mcg, Oral, Q AM  lidocaine, 1 patch, Transdermal, Q24H  losartan, 100 mg, Oral, Q24H  miconazole, 1 application, Topical, Q12H  pantoprazole, 40 mg, Oral, Daily  [START ON 8/5/2022] predniSONE, 5 mg, Oral, Daily With Breakfast  predniSONE, 7.5 mg, Oral, Daily With Breakfast  rOPINIRole, 0.75 mg, Oral, Nightly  senna-docusate sodium, 2 tablet, Oral, BID  sertraline, 150 mg, Oral, Daily  sodium chloride, 10 mL, Intravenous, Q12H  spironolactone, 12.5 mg, Oral, Daily  tamsulosin, 0.4 mg, Oral, Daily             Assessment & Plan       Metabolic encephalopathy    Hypothyroidism    Type 2 diabetes mellitus with kidney complication, with long-term current use of insulin ([facility])    Rheumatoid arthritis ([facility])    Esophageal dysmotility    ESRD (end stage renal disease) ([facility])    Chronic diastolic CHF (congestive heart failure) ([facility])    Anemia due to chronic kidney disease, on chronic dialysis ([facility])    Hyponatremia    History of stroke- R MCA s/p TPA with subsequent ICH with debility    Heart murmur    Bradycardia       1.  Elevated troponin.  No anginal symptoms but has multiple risk factors for coronary disease including diabetes.  Troponin is trending down and echo normal in 7/29/2022  2.  Hypertension.  on amlodipine 5 twice daily and decrease clonidine 0.1 mg every 8 hours.   3.  Bradycardia, Asymptomatic as above, EP saw and does not want to do a temporary pacing device at this time.  4.  End-stage renal disease, on HD  5.  Anemia - stable, repeat H/H  6.  Diabetes  7.  Encephalopathy, improved with dialysis  8.  Chronic hyponatremia  9.  Moderate to large- posteriorly situated pericardial effusion  10.  Severe tricuspid  insufficiency with pulmonary hypertension.    4 L removed with dialysis yesterday.  We will continue to try to push volume removal to control blood pressure and treat pericardial effusion.    BP is still high, increase spironolactone 25 mg daily and watch potassium.    Overall looks better today than yesterday and her heart rate is better.  We will follow.    Ania Agrawal MD  08/02/22  10:47 EDT

## 2022-08-02 NOTE — PLAN OF CARE
Goal Outcome Evaluation:  Plan of Care Reviewed With: patient        Progress: no change  Outcome Evaluation: Pt. A/OX4, SB on the monitor, c/o pain to left leg and hip, prn pain medication given and effective, O2sat dropping in 80's, placed pt. on O2 @2L nc, and changed probe, safety measures in place, will continue to monitor

## 2022-08-02 NOTE — PROGRESS NOTES
Name: Zaina Martinez ADMIT: 2022   : 1965  PCP: Karson Oreilly MD    MRN: [id number] LOS: 1 days   AGE/SEX: 56 y.o. female  ROOM: [facility]     Subjective   Subjective   CC: AMS  No acute events. Patient has no new complaints. She is feeling a little better today. Taking PO. No CP/dyspnea/f/c/n/v/d.  No family at bedside.  Objective   Objective   Vital Signs  Temp:  [97.8 °F (36.6 °C)-98.2 °F (36.8 °C)] 97.8 °F (36.6 °C)  Heart Rate:  [56-62] 57  Resp:  [16-20] 16  BP: (112-186)/(51-79) 168/76  SpO2:  [90 %-100 %] 100 %  on  Flow (L/min):  [1-2] 2;   Device (Oxygen Therapy): room air  Body mass index is 26.37 kg/m².  Physical Exam  Vitals and nursing note reviewed.   Constitutional:       General: She is not in acute distress.     Appearance: She is not toxic-appearing or diaphoretic.   HENT:      Head: Normocephalic and atraumatic.      Nose: Nose normal.      Mouth/Throat:      Mouth: Mucous membranes are moist.      Pharynx: Oropharynx is clear.   Eyes:      Conjunctiva/sclera: Conjunctivae normal.      Pupils: Pupils are equal, round, and reactive to light.   Cardiovascular:      Rate and Rhythm: Normal rate and regular rhythm.      Pulses: Normal pulses.   Pulmonary:      Effort: Pulmonary effort is normal.      Breath sounds: Examination of the right-lower field reveals decreased breath sounds. Examination of the left-lower field reveals decreased breath sounds. Decreased breath sounds present.   Abdominal:      General: Bowel sounds are normal.      Palpations: Abdomen is soft.   Musculoskeletal:         General: Swelling (1-2+ BLE) present. No tenderness.      Cervical back: Normal range of motion and neck supple.   Skin:     General: Skin is warm and dry.      Capillary Refill: Capillary refill takes less than 2 seconds.   Neurological:      General: No focal deficit present.      Mental Status: She is alert and oriented to person, place, and time.   Psychiatric:         Mood and Affect: Mood  normal.         Behavior: Behavior normal.       Results Review     I reviewed the patient's new clinical results.  I reviewed the patient's telemetry.  Results from last 7 days   Lab Units 08/02/22  0442 08/01/22  1748 07/28/22  0450 07/27/22  1232   WBC 10*3/mm3 14.33* 14.32* 16.18* 18.02*   HEMOGLOBIN g/dL 8.6* 8.9* 7.9* 8.2*   PLATELETS 10*3/mm3 317 312 297 299     Results from last 7 days   Lab Units 08/01/22  0549 07/31/22  0541 07/28/22  0450 07/27/22  1232   SODIUM mmol/L 129* 134* 137 128*   POTASSIUM mmol/L 4.7 4.0 3.5 5.0   CHLORIDE mmol/L 95* 99 98 90*   CO2 mmol/L 21.0* 26.0 27.5 21.4*   BUN mg/dL 28* 23* 18 43*   CREATININE mg/dL 3.63* 2.76* 2.36* 4.33*   GLUCOSE mg/dL 301* 83 113* 459*   EGFR mL/min/1.73 14.1* 19.6* 23.6* 11.4*     Results from last 7 days   Lab Units 08/01/22  0549 07/31/22  0541 07/28/22  0450 07/27/22  1232   ALBUMIN g/dL 2.60* 2.80* 3.00* 3.10*   BILIRUBIN mg/dL  --   --  0.6 0.5   ALK PHOS U/L  --   --  236* 283*   AST (SGOT) U/L  --   --  27 22   ALT (SGPT) U/L  --   --  15 13     Results from last 7 days   Lab Units 08/01/22  0549 07/31/22  0541 07/28/22  0450 07/27/22  1232   CALCIUM mg/dL 7.6* 7.8* 7.9* 8.1*   ALBUMIN g/dL 2.60* 2.80* 3.00* 3.10*   PHOSPHORUS mg/dL 3.5 2.9  --   --      Results from last 7 days   Lab Units 07/27/22  1421 07/27/22  1232   PROCALCITONIN ng/mL  --  0.81*   LACTATE mmol/L 1.0  --      Glucose   Date/Time Value Ref Range Status   08/02/2022 1105 216 (H) 70 - 130 mg/dL Final     Comment:     Meter: [id number] : [id number] Dilcia Khan    08/02/2022 0559 370 (H) 70 - 130 mg/dL Final     Comment:     Meter: [id number] : [id number] Phan Radha NA   08/01/2022 2106 182 (H) 70 - 130 mg/dL Final     Comment:     Meter: [id number] : [id number] Phan Radha NA   08/01/2022 1639 158 (H) 70 - 130 mg/dL Final     Comment:     Meter: [id number] : [id number] Kathleen Desai RN   08/01/2022 1141 279 (H) 70 - 130 mg/dL Final     Comment:      Meter: [id number] : [id number] Jose SAAVEDRA   08/01/2022 1009 312 (H) 70 - 130 mg/dL Final     Comment:     Meter: [id number] : [id number] Jose SAAVEDRA   08/01/2022 0622 328 (H) 70 - 130 mg/dL Final     Comment:     Meter: [id number] : [id number] Heron SAAVEDRA       No radiology results for the last day  Scheduled Medications  albuterol, 2.5 mg, Nebulization, 4x Daily - RT  amLODIPine, 5 mg, Oral, BID  aspirin, 81 mg, Oral, Daily  cloNIDine, 0.1 mg, Oral, Q8H  epoetin brooke-epbx, 10,000 Units, Subcutaneous, Once per day on Mon Wed Fri  folic acid, 1 mg, Oral, Daily  gabapentin, 100 mg, Oral, Daily With Lunch  hydrALAZINE, 100 mg, Oral, Q8H  insulin glargine, 10 Units, Subcutaneous, Nightly  insulin lispro, 0-14 Units, Subcutaneous, TID AC  levothyroxine, 200 mcg, Oral, Q AM  lidocaine, 1 patch, Transdermal, Q24H  losartan, 100 mg, Oral, Q24H  miconazole, , Topical, Q12H  pantoprazole, 40 mg, Oral, Daily  [START ON 8/5/2022] predniSONE, 5 mg, Oral, Daily With Breakfast  predniSONE, 7.5 mg, Oral, Daily With Breakfast  rOPINIRole, 0.75 mg, Oral, Nightly  senna-docusate sodium, 2 tablet, Oral, BID  sertraline, 150 mg, Oral, Daily  sodium chloride, 10 mL, Intravenous, Q12H  [START ON 8/3/2022] spironolactone, 25 mg, Oral, Daily  tamsulosin, 0.4 mg, Oral, Daily    Infusions   Diet  Diet Regular; Cardiac       Assessment/Plan     Active Hospital Problems    Diagnosis  POA   • **Metabolic encephalopathy [G93.41]  Unknown   • History of stroke- R MCA s/p TPA with subsequent ICH with debility [Z86.73]  Not Applicable   • Heart murmur [R01.1]  Yes   • Bradycardia [R00.1]  Yes   • Hyponatremia [E87.1]  Yes   • Anemia due to chronic kidney disease, on chronic dialysis ([facility]) [N18.6, D63.1, Z99.2]  Not Applicable   • Chronic diastolic CHF (congestive heart failure) ([facility]) [I50.32]  Yes   • ESRD (end stage renal disease) ([facility]) [N18.6]  Yes   • Hypothyroidism [E03.9]  Yes   • Esophageal dysmotility [K22.4]  Yes    • Rheumatoid arthritis (HCC) [M06.9]  Yes   • Type 2 diabetes mellitus with kidney complication, with long-term current use of insulin ([facility]) [E11.29, Z79.4]  Not Applicable      Resolved Hospital Problems   No resolved problems to display.   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  - continue current regimen    ESRD  - on HD per nephrology, appreciate recs    Type 2 DM  - brittle-had recent episodes of hypoglycemia-bedtime snack encouraged  - continue current regimen    HTN  - continue on current regimen  - complicated by bradycardia which limits medication options  - hopefully will improve with volume removal    Elevated Troponin/dyspnea on exertion  - stable, no evidence of ACS  - echocardiogram noted, unlikely ischemic-probably related to volume overload, BP, and valvular disease-dry weight challenge per nephrology-4L removed yesterday  - appreciate cardiology recs, I d/w Dr. Agrawal    Rheumatoid Arthritis  - on prednisone    SCDs for DVT prophylaxis.  Full code.  Discussed with patient, nursing staff, consulting provider and care team on multidisciplinary rounds.  Anticipate discharge home with HH vs SNU facility in 1-2 days.      Mat Marte MD  [facility]  08/02/22  13:47 EDT

## 2022-08-03 LAB
ANION GAP SERPL CALCULATED.3IONS-SCNC: 10.9 MMOL/L (ref 5–15)
BUN SERPL-MCNC: 26 MG/DL (ref 6–20)
BUN/CREAT SERPL: 7 (ref 7–25)
CALCIUM SPEC-SCNC: 8.3 MG/DL (ref 8.6–10.5)
CHLORIDE SERPL-SCNC: 91 MMOL/L (ref 98–107)
CO2 SERPL-SCNC: 23.1 MMOL/L (ref 22–29)
CREAT SERPL-MCNC: 3.72 MG/DL (ref 0.57–1)
EGFRCR SERPLBLD CKD-EPI 2021: 13.7 ML/MIN/1.73
GLUCOSE BLDC GLUCOMTR-MCNC: 166 MG/DL (ref 70–130)
GLUCOSE BLDC GLUCOMTR-MCNC: 168 MG/DL (ref 70–130)
GLUCOSE BLDC GLUCOMTR-MCNC: 193 MG/DL (ref 70–130)
GLUCOSE BLDC GLUCOMTR-MCNC: 241 MG/DL (ref 70–130)
GLUCOSE SERPL-MCNC: 145 MG/DL (ref 65–99)
POTASSIUM SERPL-SCNC: 4.6 MMOL/L (ref 3.5–5.2)
SODIUM SERPL-SCNC: 125 MMOL/L (ref 136–145)

## 2022-08-03 PROCEDURE — 99232 SBSQ HOSP IP/OBS MODERATE 35: CPT | Performed by: INTERNAL MEDICINE

## 2022-08-03 PROCEDURE — 97530 THERAPEUTIC ACTIVITIES: CPT

## 2022-08-03 PROCEDURE — G0378 HOSPITAL OBSERVATION PER HR: HCPCS

## 2022-08-03 PROCEDURE — 63710000001 INSULIN LISPRO (HUMAN) PER 5 UNITS: Performed by: INTERNAL MEDICINE

## 2022-08-03 PROCEDURE — G0257 UNSCHED DIALYSIS ESRD PT HOS: HCPCS

## 2022-08-03 PROCEDURE — 97116 GAIT TRAINING THERAPY: CPT

## 2022-08-03 PROCEDURE — 94664 DEMO&/EVAL PT USE INHALER: CPT

## 2022-08-03 PROCEDURE — 94760 N-INVAS EAR/PLS OXIMETRY 1: CPT

## 2022-08-03 PROCEDURE — 82962 GLUCOSE BLOOD TEST: CPT

## 2022-08-03 PROCEDURE — 94799 UNLISTED PULMONARY SVC/PX: CPT

## 2022-08-03 PROCEDURE — 25010000002 HEPARIN (PORCINE) PER 1000 UNITS: Performed by: INTERNAL MEDICINE

## 2022-08-03 PROCEDURE — 63710000001 PREDNISONE PER 5 MG: Performed by: INTERNAL MEDICINE

## 2022-08-03 PROCEDURE — 80048 BASIC METABOLIC PNL TOTAL CA: CPT | Performed by: INTERNAL MEDICINE

## 2022-08-03 PROCEDURE — 94761 N-INVAS EAR/PLS OXIMETRY MLT: CPT

## 2022-08-03 PROCEDURE — 97535 SELF CARE MNGMENT TRAINING: CPT

## 2022-08-03 RX ADMIN — INSULIN LISPRO 3 UNITS: 100 INJECTION, SOLUTION INTRAVENOUS; SUBCUTANEOUS at 19:53

## 2022-08-03 RX ADMIN — HYDRALAZINE HYDROCHLORIDE 100 MG: 50 TABLET, FILM COATED ORAL at 21:08

## 2022-08-03 RX ADMIN — PREDNISONE 7.5 MG: 1 TABLET ORAL at 08:22

## 2022-08-03 RX ADMIN — ALBUTEROL SULFATE 2.5 MG: 2.5 SOLUTION RESPIRATORY (INHALATION) at 11:41

## 2022-08-03 RX ADMIN — HEPARIN SODIUM 4000 UNITS: 1000 INJECTION INTRAVENOUS; SUBCUTANEOUS at 16:41

## 2022-08-03 RX ADMIN — LOSARTAN POTASSIUM 100 MG: 100 TABLET, FILM COATED ORAL at 08:23

## 2022-08-03 RX ADMIN — ASPIRIN 81 MG: 81 TABLET, COATED ORAL at 08:23

## 2022-08-03 RX ADMIN — AMLODIPINE BESYLATE 5 MG: 5 TABLET ORAL at 21:09

## 2022-08-03 RX ADMIN — PANTOPRAZOLE SODIUM 40 MG: 40 TABLET, DELAYED RELEASE ORAL at 08:23

## 2022-08-03 RX ADMIN — ROPINIROLE HYDROCHLORIDE 0.75 MG: 0.5 TABLET, FILM COATED ORAL at 21:08

## 2022-08-03 RX ADMIN — Medication 10 ML: at 21:08

## 2022-08-03 RX ADMIN — HYDRALAZINE HYDROCHLORIDE 100 MG: 50 TABLET, FILM COATED ORAL at 05:03

## 2022-08-03 RX ADMIN — TAMSULOSIN HYDROCHLORIDE 0.4 MG: 0.4 CAPSULE ORAL at 08:23

## 2022-08-03 RX ADMIN — Medication 3 MG: at 21:13

## 2022-08-03 RX ADMIN — GABAPENTIN 100 MG: 100 CAPSULE ORAL at 12:09

## 2022-08-03 RX ADMIN — INSULIN GLARGINE-YFGN 10 UNITS: 100 INJECTION, SOLUTION SUBCUTANEOUS at 21:11

## 2022-08-03 RX ADMIN — Medication 10 ML: at 08:25

## 2022-08-03 RX ADMIN — AMLODIPINE BESYLATE 5 MG: 5 TABLET ORAL at 08:23

## 2022-08-03 RX ADMIN — DOCUSATE SODIUM 50MG AND SENNOSIDES 8.6MG 2 TABLET: 8.6; 5 TABLET, FILM COATED ORAL at 21:09

## 2022-08-03 RX ADMIN — FOLIC ACID 1 MG: 1 TABLET ORAL at 08:23

## 2022-08-03 RX ADMIN — MICONAZOLE NITRATE: 2 POWDER TOPICAL at 08:25

## 2022-08-03 RX ADMIN — CLONIDINE HYDROCHLORIDE 0.1 MG: 0.1 TABLET ORAL at 05:03

## 2022-08-03 RX ADMIN — INSULIN LISPRO 3 UNITS: 100 INJECTION, SOLUTION INTRAVENOUS; SUBCUTANEOUS at 12:09

## 2022-08-03 RX ADMIN — OXYCODONE 5 MG: 5 TABLET ORAL at 05:18

## 2022-08-03 RX ADMIN — OXYCODONE 5 MG: 5 TABLET ORAL at 21:13

## 2022-08-03 RX ADMIN — INSULIN LISPRO 3 UNITS: 100 INJECTION, SOLUTION INTRAVENOUS; SUBCUTANEOUS at 08:23

## 2022-08-03 RX ADMIN — SPIRONOLACTONE 25 MG: 25 TABLET, FILM COATED ORAL at 08:22

## 2022-08-03 RX ADMIN — SERTRALINE 150 MG: 100 TABLET, FILM COATED ORAL at 08:23

## 2022-08-03 RX ADMIN — LEVOTHYROXINE SODIUM 200 MCG: 0.1 TABLET ORAL at 05:02

## 2022-08-03 RX ADMIN — CLONIDINE HYDROCHLORIDE 0.1 MG: 0.1 TABLET ORAL at 21:09

## 2022-08-03 NOTE — THERAPY TREATMENT NOTE
Patient Name: Zaina Martinez  : 1965    MRN: [id number]                              Today's Date: 8/3/2022       Admit Date: 2022    Visit Dx:     ICD-10-CM ICD-9-CM   1. ESRD (end stage renal disease) ([facility])  N18.6 585.6   2. Altered mental status, unspecified altered mental status type  R41.82 780.97   3. Hyperglycemia  R73.9 790.29   4. Hyponatremia  E87.1 276.1   5. Pneumonia of left lung due to infectious organism, unspecified part of lung  J18.9 486   6. Chronic anemia  D64.9 285.9     Patient Active Problem List   Diagnosis   • Renal insufficiency   • Hypertensive disorder   • Hypothyroidism   • Type 2 diabetes mellitus with kidney complication, with long-term current use of insulin ([facility])   • Rheumatoid arthritis ([facility])   • Angioedema   • Esophageal dysmotility   • Anemia   • Medically noncompliant   • Myocardial infarction due to demand ischemia ([facility])   • Enteritis   • PRES (posterior reversible encephalopathy syndrome)   • Urine retention   • Klebsiella infection   • Superficial thrombophlebitis   • Generalized weakness   • ESRD (end stage renal disease) ([facility])   • CAD (coronary artery disease)   • Abnormal urinalysis   • Chronic diastolic CHF (congestive heart failure) ([facility])   • Pyelonephritis   • Calculus of gallbladder with acute on chronic cholecystitis without obstruction   • Pleural effusion on right   • Anemia due to chronic kidney disease, on chronic dialysis ([facility])   • Abnormal findings on diagnostic imaging of other specified body structures   • Acute upper respiratory infection   • Agitation   • Alkaline phosphatase raised   • Casts present in urine   • Cellulitis of toe   • Hip pain   • Community acquired pneumonia   • Depressive disorder   • Diarrhea of presumed infectious origin   • Difficult or painful urination   • Disease due to severe acute respiratory syndrome coronavirus 2 (SARS-CoV-2)   • Dyspnea   • Encounter for follow-up examination after completed treatment for conditions  other than malignant neoplasm   • H/O: hypothyroidism   • Hyperlipidemia   • Hypomagnesemia   • Intractable vomiting with nausea   • Leukocytosis   • Luetscher's syndrome   • Need for influenza vaccination   • Restless legs   • Noncompliance with treatment   • Shoulder pain   • Urinary tract infectious disease   • Metabolic encephalopathy   • Abnormal findings on diagnostic imaging of abdomen   • Status post cholecystectomy   • Hyponatremia   • Leukocytosis   • Acute metabolic encephalopathy   • Encephalopathy, toxic   • Acute CVA (cerebrovascular accident) (HCC)   • Intracranial hemorrhage (HCC)   • Stroke (HCC)   • Abnormal urinalysis   • Diabetic muscle infarction (HCC)   • Other insomnia   • Altered mental status   • History of stroke- R MCA s/p TPA with subsequent ICH with debility   • Heart murmur   • Bradycardia   • Left lower lobe pneumonia   • Metabolic encephalopathy     Past Medical History:   Diagnosis Date   • Acute CVA (cerebrovascular accident) (HCC) 5/1/2022   • Acute on chronic diastolic CHF (congestive heart failure) (HCC)    • CAD (coronary artery disease) 12/20/2021   • Diabetes (HCC)    • Disease of thyroid gland    • GERD (gastroesophageal reflux disease)    • Hyperlipidemia 11/30/2018   • Hypertension    • Rheumatoid arthritis (HCC)      Past Surgical History:   Procedure Laterality Date   • CHOLECYSTECTOMY WITH INTRAOPERATIVE CHOLANGIOGRAM N/A 1/10/2022    Procedure: Laparoscopic cholecystectomy with intraoperative cholangiogram;  Surgeon: Ramana Raygoza MD;  Location: [facility];  Service: General;  Laterality: N/A;   • EYE SURGERY     • HYSTERECTOMY     • INSERTION HEMODIALYSIS CATHETER N/A 12/6/2021    Procedure: HEMODIALYSIS CATHETER INSERTION;  Surgeon: Keli Salazar MD;  Location: [facility] 18/19;  Service: Vascular;  Laterality: N/A;   • INSERTION HEMODIALYSIS CATHETER N/A 5/3/2022    Procedure: TUNNELED CATHETER PLACEMENT;  Surgeon: Keli Salazar MD;   Location: [facility] MAIN OR;  Service: Vascular;  Laterality: N/A;   • MUSCLE BIOPSY Left 6/15/2022    Procedure: Left quadriceps muscle biopsy;  Surgeon: Marques Ma MD;  Location: [facility] MAIN OR;  Service: Neurosurgery;  Laterality: Left;      General Information     Row Name 08/03/22 1256          Physical Therapy Time and Intention    Document Type therapy note (daily note)  -EB     Mode of Treatment individual therapy;physical therapy  -     Row Name 08/03/22 1256          General Information    Existing Precautions/Restrictions fall  -     Row Name 08/03/22 1256          Cognition    Orientation Status (Cognition) oriented x 3  -     Row Name 08/03/22 1256          Safety Issues, Functional Mobility    Impairments Affecting Function (Mobility) endurance/activity tolerance;strength;balance  -           User Key  (r) = Recorded By, (t) = Taken By, (c) = Cosigned By    Initials Name Provider Type    EB Luann Gutierrez PTA Physical Therapist Assistant               Mobility     Row Name 08/03/22 1258          Bed Mobility    Supine-Sit [place] (Bed Mobility) minimum assist (75% patient effort);verbal cues  -     Sit-Supine [place] (Bed Mobility) not tested  -     Assistive Device (Bed Mobility) bed rails;head of bed elevated  -EB     Comment, (Bed Mobility) increased time with bed mobility. needed assist  with upper trunk  -     Row Name 08/03/22 1258          Sit-Stand Transfer    Sit-Stand [place] (Transfers) minimum assist (75% patient effort);moderate assist (50% patient effort);2 person assist  -EB     Assistive Device (Sit-Stand Transfers) walker, front-wheeled  -EB     Comment, (Sit-Stand Transfer) 2 sit<->stand transfers, Philip with standing from EOB, ModAX2 with standing from low toilet seat.  -     Row Name 08/03/22 1258          Gait/Stairs (Locomotion)    [place] Level (Gait) contact guard  -EB     Assistive Device (Gait) walker, front-wheeled  -EB     Distance in  Feet (Gait) 10ftX2, 15ft  -EB     Deviations/Abnormal Patterns (Gait) kenn decreased;gait speed decreased;stride length decreased;antalgic  -EB     Bilateral Gait Deviations forward flexed posture  -EB     Comment, (Gait/Stairs) pt with slow gait pace. Needed verbal cues for upright posture. Pt ambulated to and from bathroom and then to the chair. pt fatigues quickly which limits ambulation distance.  -EB           User Key  (r) = Recorded By, (t) = Taken By, (c) = Cosigned By    Initials Name Provider Type    Luann Brenner PTA Physical Therapist Assistant               Obj/Interventions    No documentation.                Goals/Plan    No documentation.                Clinical Impression     Row Name 08/03/22 1301          Pain    Pain Location - Side/Orientation Left  -     Pain Location lower  -EB     Pain Location - extremity  -EB     Row Name 08/03/22 1301          Plan of Care Review    Plan of Care Reviewed With patient  -EB     Progress improving  -     Outcome Evaluation Pt tolerated treatment with minimal c/o left LE pain. Pt needed Philip with bed mobility. Pt required increased time and assist with upper trunk. Pt is Philip with sit<->stand transfers from EOB but required ModAX2 with transfers from low toilet seat. Pt ambulated 10ftX2 then another 15ft with CGA, rwx. Pt fatigues quickly which limits ambulation distance and activity. Will continue to progress pt as able.  -     Row Name 08/03/22 1301          Therapy Assessment/Plan (PT)    Therapy Frequency (PT) 6 times/wk  -     Row Name 08/03/22 1301          Positioning and Restraints    Pre-Treatment Position in bed  -EB     Post Treatment Position chair  -EB     In Chair reclined;call light within reach;encouraged to call for assist;exit alarm on  -EB           User Key  (r) = Recorded By, (t) = Taken By, (c) = Cosigned By    Initials Name Provider Type    Luann Brenner PTA Physical Therapist Assistant               Outcome Measures      Row Name 08/03/22 1304          How much help from another person do you currently need...    Turning from your back to your side while in flat bed without using bedrails? 3  -EB     Moving from lying on back to sitting on the side of a flat bed without bedrails? 3  -EB     Moving to and from a bed to a chair (including a wheelchair)? 3  -EB     Standing up from a chair using your arms (e.g., wheelchair, bedside chair)? 3  -EB     Climbing 3-5 steps with a railing? 2  -EB     To walk in hospital room? 3  -EB     AM-PAC 6 Clicks Score (PT) 17  -EB     Highest level of mobility 5 --> Static standing  -EB     Row Name 08/03/22 1304          Modified Millbrook Scale    Modified [place]brook Scale 4 - Moderately severe disability.  Unable to walk without assistance, and unable to attend to own bodily needs without assistance.  -EB           User Key  (r) = Recorded By, (t) = Taken By, (c) = Cosigned By    Initials Name Provider Type    Luann Brenner PTA Physical Therapist Assistant                             Physical Therapy Education                 Title: PT OT SLP Therapies (Done)     Topic: Physical Therapy (Done)     Point: Mobility training (Done)     Learning Progress Summary           Patient Acceptance, E, VU by EB at 8/3/2022 1304    Acceptance, E,D, VU by PH at 8/2/2022 1548    Acceptance, E,D, VU,NR by MS at 7/29/2022 1528    Acceptance, E,D, VU,NR by MS at 7/28/2022 1106                   Point: Home exercise program (Done)     Learning Progress Summary           Patient Acceptance, E, VU by EB at 8/3/2022 1304    Acceptance, E,D, VU by PH at 8/2/2022 1548    Acceptance, E,D, VU,NR by MS at 7/29/2022 1528    Acceptance, E,D, VU,NR by MS at 7/28/2022 1106                   Point: Body mechanics (Done)     Learning Progress Summary           Patient Acceptance, E, VU by EB at 8/3/2022 1304    Acceptance, E,D, VU by PH at 8/2/2022 1548    Acceptance, E,D, VU,NR by MS at 7/29/2022 1528    Acceptance, E,D, VU,NR  by MS at 7/28/2022 1106                   Point: Precautions (Done)     Learning Progress Summary           Patient Acceptance, E, VU by EB at 8/3/2022 1304    Acceptance, E,D, VU by  at 8/2/2022 1548    Acceptance, E,D, VU,NR by MS at 7/29/2022 1528    Acceptance, E,D, VU,NR by MS at 7/28/2022 1106                               User Key     Initials Effective Dates Name Provider Type Discipline    MS 06/16/21 -  Mat Baptiste, PT Physical Therapist PT    EB 06/16/21 -  Luann Gutierrez PTA Physical Therapist Assistant PT     06/16/21 -  Daja Rushing PTA Physical Therapist Assistant PT              PT Recommendation and Plan     Plan of Care Reviewed With: patient  Progress: improving  Outcome Evaluation: Pt tolerated treatment with minimal c/o left LE pain. Pt needed Philip with bed mobility. Pt required increased time and assist with upper trunk. Pt is Philip with sit<->stand transfers from EOB but required ModAX2 with transfers from low toilet seat. Pt ambulated 10ftX2 then another 15ft with CGA, rwx. Pt fatigues quickly which limits ambulation distance and activity. Will continue to progress pt as able.     Time Calculation:    PT Charges     Row Name 08/03/22 1255             Time Calculation    Start Time 1017  -EB      Stop Time 1050  -EB      Time Calculation (min) 33 min  -EB      PT Received On 08/03/22  -EB      PT - Next Appointment 08/04/22  -EB              Time Calculation- PT    Total Timed Code Minutes- PT 23 minute(s)  -EB            User Key  (r) = Recorded By, (t) = Taken By, (c) = Cosigned By    Initials Name Provider Type    EB Luann Gutierrez PTA Physical Therapist Assistant              Therapy Charges for Today     Code Description Service Date Service Provider Modifiers Qty    80608038374 HC GAIT TRAINING EA 15 MIN 8/3/2022 Luann Gutierrez PTA GP 1    01582180991 HC PT THERAPEUTIC ACT EA 15 MIN 8/3/2022 Luann Gutierrez PTA GP 1          PT G-Codes  Outcome Measure Options: AM-PAC  6 Clicks Basic Mobility (PT)  AM-PAC 6 Clicks Score (PT): 17  AM-PAC 6 Clicks Score (OT): 12  Modified Feura Bush Scale: 4 - Moderately severe disability.  Unable to walk without assistance, and unable to attend to own bodily needs without assistance.    Luann Gutierrez, PTA  8/3/2022

## 2022-08-03 NOTE — PLAN OF CARE
Goal Outcome Evaluation:  Plan of Care Reviewed With: patient    Progress: improving  Outcome Evaluation: Pt was found supine in bed, pleasant and agreeable to OOB ax. CGA for bed mobility and fxl ambulation into bathroom using RW with CGA. She stands sinkside ~5 mins to participate in grooming tasks with set-upA. Toilet TF with modA d/t low seat height, Philip for clothing mgt. She amb around bed to chair with CGA, does report fatigue s/p activity. Pt demo's improvement in fxl mobility and indep with ADLs but continues to demo impaired endurance/act tolerance and strength.    Hand hygiene completed before and after session. Appropriate PPE worn t/o

## 2022-08-03 NOTE — PLAN OF CARE
Goal Outcome Evaluation:  Plan of Care Reviewed With: patient        Progress: improving  Outcome Evaluation: Pt tolerated treatment with minimal c/o left LE pain. Pt needed Philip with bed mobility. Pt required increased time and assist with upper trunk. Pt is Philip with sit<->stand transfers from EOB but required ModAX2 with transfers from low toilet seat. Pt ambulated 10ftX2 then another 15ft with CGA, rwx. Pt fatigues quickly which limits ambulation distance and activity. Will continue to progress pt as able.

## 2022-08-03 NOTE — DISCHARGE PLACEMENT REQUEST
"Zaina Martinez (56 y.o. Female)             Date of Birth   1965    Social Security Number       Address   [address] [place] [address]    Home Phone   [phone]    MRN   [id number]       Confucianism   None    Marital Status                               Admission Date   7/27/22    Admission Type   Emergency    Admitting Provider   Eugenia Rodriguez MD    Attending Provider   Mat Marte MD    Department, Room/Bed   [address], [address]/1       Discharge Date       Discharge Disposition       Discharge Destination                               Attending Provider: Mat Marte MD    Allergies: Contrast Dye, Ibuprofen, Prochlorperazine    Isolation: Contact   Infection: MRSA (07/28/22)   Code Status: CPR   Advance Care Planning Activity    Ht: 157.5 cm (62\")   Wt: 66.8 kg (147 lb 4.3 oz)    Admission Cmt: None   Principal Problem: Metabolic encephalopathy [G93.41]                 Active Insurance as of 7/27/2022     Primary Coverage     Payor Plan Insurance Group Employer/Plan Group    ANTH MEDICARE REPLACEMENT [facility] MEDICARE ADVANTAGE [id number]     Payor Plan Address Payor Plan Phone Number Payor Plan Fax Number Effective Dates    [address] [phone]  1/1/2019 - None Entered    [facility] [address]       Subscriber Name Subscriber Birth Date Member ID       ZAINA MARTINEZ 1965 [id number]                 Emergency Contacts      (Rel.) Home Phone Work Phone Mobile Phone    Marv Martinez (Spouse) [phone] -- [phone]    JuanFiona (Daughter) [phone] -- [phone]              "

## 2022-08-03 NOTE — PROGRESS NOTES
[facility] Progress Note      Patient Name: Zaina Martinez  : 1965  MRN: [id number]  Primary Care Physician:  Karson Oreilly MD  Date of admission: 2022    Subjective     Interval History:     Seen and examined.  Denies shortness of air.   Denies any complaints.  On 2 L nasal cannula.  Review of Systems:   As noted above    Objective     Vitals:   Temp:  [98 °F (36.7 °C)-98.7 °F (37.1 °C)] 98.3 °F (36.8 °C)  Heart Rate:  [52-61] 61  Resp:  [16] 16  BP: (135-171)/(67-96) 135/69  Flow (L/min):  [2] 2  No intake or output data in the 24 hours ending 22 1506    Physical Exam:    General Appearance: alert, oriented x 3, NAD, chronically ill  Skin: warm and dry  HEENT: oral mucosa normal, nonicteric sclera  Neck: supple, no JVD  Lungs: CTA, not labored on RA  Heart: RR, bradycardic, normal S1 and S2  Abdomen: soft, nontender, nondistended, BS +  : no palpable bladder  Extremities: no edema, cyanosis or clubbing  Vascular: Right chest TDC  Neuro: normal speech and mental status     Scheduled Meds:     albuterol, 2.5 mg, Nebulization, 4x Daily - RT  amLODIPine, 5 mg, Oral, BID  aspirin, 81 mg, Oral, Daily  cloNIDine, 0.1 mg, Oral, Q8H  epoetin brooke-epbx, 10,000 Units, Subcutaneous, Once per day on   folic acid, 1 mg, Oral, Daily  gabapentin, 100 mg, Oral, Daily With Lunch  hydrALAZINE, 100 mg, Oral, Q8H  insulin glargine, 10 Units, Subcutaneous, Nightly  insulin lispro, 0-14 Units, Subcutaneous, TID AC  levothyroxine, 200 mcg, Oral, Q AM  lidocaine, 1 patch, Transdermal, Q24H  losartan, 100 mg, Oral, Q24H  miconazole, , Topical, Q12H  pantoprazole, 40 mg, Oral, Daily  [START ON 2022] predniSONE, 5 mg, Oral, Daily With Breakfast  predniSONE, 7.5 mg, Oral, Daily With Breakfast  rOPINIRole, 0.75 mg, Oral, Nightly  senna-docusate sodium, 2 tablet, Oral, BID  sertraline, 150 mg, Oral, Daily  sodium chloride, 10 mL, Intravenous, Q12H  spironolactone, 25 mg, Oral,  Daily  tamsulosin, 0.4 mg, Oral, Daily      IV Meds:        Results Reviewed:   I have personally reviewed the results from the time of this admission to 8/3/2022 15:06 EDT     Results from last 7 days   Lab Units 08/03/22  1101 08/01/22  0549 07/31/22  0541 07/28/22  0450   SODIUM mmol/L 125* 129* 134* 137   POTASSIUM mmol/L 4.6 4.7 4.0 3.5   CHLORIDE mmol/L 91* 95* 99 98   CO2 mmol/L 23.1 21.0* 26.0 27.5   BUN mg/dL 26* 28* 23* 18   CREATININE mg/dL 3.72* 3.63* 2.76* 2.36*   CALCIUM mg/dL 8.3* 7.6* 7.8* 7.9*   BILIRUBIN mg/dL  --   --   --  0.6   ALK PHOS U/L  --   --   --  236*   ALT (SGPT) U/L  --   --   --  15   AST (SGOT) U/L  --   --   --  27   GLUCOSE mg/dL 145* 301* 83 113*     Estimated Creatinine Clearance: 15.1 mL/min (A) (by C-G formula based on SCr of 3.72 mg/dL (H)).  Results from last 7 days   Lab Units 08/01/22  0549 07/31/22  0541   PHOSPHORUS mg/dL 3.5 2.9         Results from last 7 days   Lab Units 08/02/22  0442 08/01/22  1748 07/28/22  0450   WBC 10*3/mm3 14.33* 14.32* 16.18*   HEMOGLOBIN g/dL 8.6* 8.9* 7.9*   PLATELETS 10*3/mm3 317 312 297           Assessment / Plan     ASSESSMENT:  1.  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 HD MWF, with stable volume and electrolytes  2.  Intracerebral bleed and altered mental status, with latter resolving. CT head showed no new findings  3.  Myoclonic jerks: resolved  4.  DM2, with labile readings  5.  Coronary artery disease  6.  Acute on chronic diastolic dysfunction .   7.  Anemia of CKD, on long-acting ANDRAE as an outpatient.    8.  Hypertension, fairly well controlled  9.  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 On ASA.      PLAN:    1.  HD tomorrow (MWF) with ongoing lowering of DW.    2.   surveillance labs      Thank you for involving us in the care of Zaina RAJ Martinez.  Please feel free to call with any questions.    Ruthann Palmer MD  08/03/22  15:06 EDT    [facility]  [phone]

## 2022-08-03 NOTE — PROGRESS NOTES
LOS: 2 days   Patient Care Team:  Karson Oreilly MD as PCP - General (Family Medicine)    Chief Complaint:     F/u bradycardia and HTN    Interval History:     She has no dizziness, chest pain, difficulty breathing.  She has no nausea.  She is eating well.  She is post have dialysis again today.    Objective   Vital Signs  Temp:  [98 °F (36.7 °C)-98.7 °F (37.1 °C)] 98.1 °F (36.7 °C)  Heart Rate:  [52-60] 52  Resp:  [16-20] 16  BP: (141-171)/(67-96) 141/72    Intake/Output Summary (Last 24 hours) at 8/3/2022 0757  Last data filed at 8/2/2022 1409  Gross per 24 hour   Intake 240 ml   Output --   Net 240 ml       Comfortable NAD  Neck supple, no JVD or thyromegaly appreciated  S1/S2 RRR, no m/r/g  Lungs CTA B, normal effort  Abdomen S/NT/ND (+) BS, no HSM appreciated  Extremities warm, no clubbing, cyanosis, or edema  No visible or palpable skin lesions  A/Ox4, mood and affect appropriate    Results Review:      Results from last 7 days   Lab Units 08/01/22  0549 07/31/22  0541 07/28/22  0450   SODIUM mmol/L 129* 134* 137   POTASSIUM mmol/L 4.7 4.0 3.5   CHLORIDE mmol/L 95* 99 98   CO2 mmol/L 21.0* 26.0 27.5   BUN mg/dL 28* 23* 18   CREATININE mg/dL 3.63* 2.76* 2.36*   GLUCOSE mg/dL 301* 83 113*   CALCIUM mg/dL 7.6* 7.8* 7.9*     Results from last 7 days   Lab Units 07/29/22  0516 07/28/22  0450 07/27/22  1232   TROPONIN T ng/mL 0.185* 0.257* 0.235*     Results from last 7 days   Lab Units 08/02/22  0442 08/01/22  1748 07/28/22  0450   WBC 10*3/mm3 14.33* 14.32* 16.18*   HEMOGLOBIN g/dL 8.6* 8.9* 7.9*   HEMATOCRIT % 29.6* 29.5* 25.0*   PLATELETS 10*3/mm3 317 312 297                       I reviewed the patient's new clinical results.  I personally viewed and interpreted the patient's EKG/Telemetry data        Medication Review:   albuterol, 2.5 mg, Nebulization, 4x Daily - RT  amLODIPine, 5 mg, Oral, BID  aspirin, 81 mg, Oral, Daily  cloNIDine, 0.1 mg, Oral, Q8H  epoetin brooke-epbx, 10,000 Units, Subcutaneous,  Once per day on Mon Wed Fri  folic acid, 1 mg, Oral, Daily  gabapentin, 100 mg, Oral, Daily With Lunch  hydrALAZINE, 100 mg, Oral, Q8H  insulin glargine, 10 Units, Subcutaneous, Nightly  insulin lispro, 0-14 Units, Subcutaneous, TID AC  levothyroxine, 200 mcg, Oral, Q AM  lidocaine, 1 patch, Transdermal, Q24H  losartan, 100 mg, Oral, Q24H  miconazole, , Topical, Q12H  pantoprazole, 40 mg, Oral, Daily  [START ON 8/5/2022] predniSONE, 5 mg, Oral, Daily With Breakfast  predniSONE, 7.5 mg, Oral, Daily With Breakfast  rOPINIRole, 0.75 mg, Oral, Nightly  senna-docusate sodium, 2 tablet, Oral, BID  sertraline, 150 mg, Oral, Daily  sodium chloride, 10 mL, Intravenous, Q12H  spironolactone, 25 mg, Oral, Daily  tamsulosin, 0.4 mg, Oral, Daily             Assessment & Plan       Metabolic encephalopathy    Hypothyroidism    Type 2 diabetes mellitus with kidney complication, with long-term current use of insulin ([facility])    Rheumatoid arthritis ([facility])    Esophageal dysmotility    ESRD (end stage renal disease) ([facility])    Chronic diastolic CHF (congestive heart failure) ([facility])    Anemia due to chronic kidney disease, on chronic dialysis ([facility])    Hyponatremia    History of stroke- R MCA s/p TPA with subsequent ICH with debility    Heart murmur    Bradycardia    1.  Elevated troponin.  No anginal symptoms but has multiple risk factors for coronary disease including diabetes.  Troponin is trending down and echo normal in 7/29/2022  2.  Hypertension.  on amlodipine 5 twice daily and decrease clonidine 0.1 mg every 8 hours.   3.  Bradycardia, Asymptomatic - with slight improvement.  EP saw and does not want to do pacing device at this time.    4.  End-stage renal disease, on HD  5.  Anemia - stable, repeat H/H  6.  Diabetes  7.  Encephalopathy, improved with dialysis  8.  Chronic hyponatremia  9.  Moderate to large- posteriorly situated pericardial effusion  10.  Severe tricuspid insufficiency with pulmonary hypertension.    Check labs - BP  slightly better and HR stable and maybe slightly better. Continue to push dry weight.  Overall, I think she is doing better.    Ania Agrawal MD  08/03/22  07:57 EDT

## 2022-08-03 NOTE — PLAN OF CARE
Goal Outcome Evaluation:               Alert and oriented today. Encephalopathy resolved. Worked with therapies. Currently in dialysis. Hopeful discharge soon. Will CTM.

## 2022-08-03 NOTE — CASE MANAGEMENT/SOCIAL WORK
Continued Stay Note  [facility]     Patient Name: Zaina Martinez  MRN: [id number]  Today's Date: 8/3/2022    Admit Date: 7/27/2022     Discharge Plan     Row Name 08/03/22 0917       Plan    Plan Subacute rehab, will need anthem pre-cert    Plan Comments Recieved vm from [facility] acute rehab per Dr. Florentino they recommend subacute rehab. SPoke with Rosalinda/[facility] unable to accept. Left message for [facility] for referral outcome. Partial Packet in ccp office. Gaye REYES/CCP               Discharge Codes    No documentation.               Expected Discharge Date and Time     Expected Discharge Date Expected Discharge Time    Aug 4, 2022             Guillermina Duffy, RN

## 2022-08-03 NOTE — CASE MANAGEMENT/SOCIAL WORK
Continued Stay Note  [facility]     Patient Name: Zaina Martinez  MRN: [id number]  Today's Date: 8/3/2022    Admit Date: 7/27/2022     Discharge Plan     Row Name 08/03/22 1625       Plan    Plan Home with [facility] and referral to [facility] for Gillian lift    Patient/Family in Agreement with Plan yes    Plan Comments Per RN, Pt wants to go home and get gillian lift. CCP called spouse as pt is in HD and he denies DME preference for gillian lift. CCP explained would reach out to [facility] and see about gillian lift. CCP spoke with Anna/Minnie and she will check cost and let CCP know about lift in am.CCP notified Viola[facility], pt plans on home and possible dc tomorrow. She verbalized understanding. Gaye REYES/CCP               Discharge Codes    No documentation.               Expected Discharge Date and Time     Expected Discharge Date Expected Discharge Time    Aug 4, 2022             Guillermina Duffy RN

## 2022-08-03 NOTE — PROGRESS NOTES
Name: Zaina Martinez ADMIT: 2022   : 1965  PCP: Karson Oreilly MD    MRN: [id number] LOS: 2 days   AGE/SEX: 56 y.o. female  ROOM: [facility]     Subjective   Subjective   CC: AMS  No acute events. Patient has no new complaints. She is overall feeling better. Taking PO. No CP/dyspnea/f/c/n/v/d.    Objective   Objective   Vital Signs  Temp:  [98 °F (36.7 °C)-98.5 °F (36.9 °C)] 98.5 °F (36.9 °C)  Heart Rate:  [52-64] 64  Resp:  [16] 16  BP: (135-171)/(67-96) 146/69  SpO2:  [93 %-97 %] 93 %  on  Flow (L/min):  [2] 2;   Device (Oxygen Therapy): room air  Body mass index is 26.94 kg/m².  Physical Exam  Vitals and nursing note reviewed.   Constitutional:       General: She is not in acute distress.     Appearance: She is not toxic-appearing or diaphoretic.   HENT:      Head: Normocephalic and atraumatic.      Nose: Nose normal.      Mouth/Throat:      Mouth: Mucous membranes are moist.      Pharynx: Oropharynx is clear.   Eyes:      Conjunctiva/sclera: Conjunctivae normal.      Pupils: Pupils are equal, round, and reactive to light.   Cardiovascular:      Rate and Rhythm: Normal rate and regular rhythm.      Pulses: Normal pulses.   Pulmonary:      Effort: Pulmonary effort is normal.      Breath sounds: Examination of the right-lower field reveals decreased breath sounds. Examination of the left-lower field reveals decreased breath sounds. Decreased breath sounds present.   Abdominal:      General: Bowel sounds are normal.      Palpations: Abdomen is soft.   Musculoskeletal:         General: Swelling (1-2+ BLE) present. No tenderness.      Cervical back: Normal range of motion and neck supple.   Skin:     General: Skin is warm and dry.      Capillary Refill: Capillary refill takes less than 2 seconds.   Neurological:      General: No focal deficit present.      Mental Status: She is alert and oriented to person, place, and time.   Psychiatric:         Mood and Affect: Mood normal.         Behavior: Behavior  normal.       Results Review     I reviewed the patient's new clinical results.  I reviewed the patient's telemetry.  Results from last 7 days   Lab Units 08/02/22  0442 08/01/22  1748 07/28/22  0450   WBC 10*3/mm3 14.33* 14.32* 16.18*   HEMOGLOBIN g/dL 8.6* 8.9* 7.9*   PLATELETS 10*3/mm3 317 312 297     Results from last 7 days   Lab Units 08/03/22  1101 08/01/22  0549 07/31/22  0541 07/28/22  0450   SODIUM mmol/L 125* 129* 134* 137   POTASSIUM mmol/L 4.6 4.7 4.0 3.5   CHLORIDE mmol/L 91* 95* 99 98   CO2 mmol/L 23.1 21.0* 26.0 27.5   BUN mg/dL 26* 28* 23* 18   CREATININE mg/dL 3.72* 3.63* 2.76* 2.36*   GLUCOSE mg/dL 145* 301* 83 113*   EGFR mL/min/1.73 13.7* 14.1* 19.6* 23.6*     Results from last 7 days   Lab Units 08/01/22  0549 07/31/22  0541 07/28/22  0450   ALBUMIN g/dL 2.60* 2.80* 3.00*   BILIRUBIN mg/dL  --   --  0.6   ALK PHOS U/L  --   --  236*   AST (SGOT) U/L  --   --  27   ALT (SGPT) U/L  --   --  15     Results from last 7 days   Lab Units 08/03/22  1101 08/01/22  0549 07/31/22  0541 07/28/22  0450   CALCIUM mg/dL 8.3* 7.6* 7.8* 7.9*   ALBUMIN g/dL  --  2.60* 2.80* 3.00*   PHOSPHORUS mg/dL  --  3.5 2.9  --          Glucose   Date/Time Value Ref Range Status   08/03/2022 1949 193 (H) 70 - 130 mg/dL Final     Comment:     Meter: [id number] : [id number] Shubham Parkinson RN   08/03/2022 1052 166 (H) 70 - 130 mg/dL Final     Comment:     Meter: [id number] : [id number] Dilcia Erin NA   08/03/2022 0624 168 (H) 70 - 130 mg/dL Final     Comment:     Meter: [id number] : [id number] Heron Miller NA   08/02/2022 2105 251 (H) 70 - 130 mg/dL Final     Comment:     Meter: [id number] : [id number] Heron Miller NA   08/02/2022 1604 172 (H) 70 - 130 mg/dL Final     Comment:     Meter: [id number] : [id number] Dilcia Erin NA   08/02/2022 1105 216 (H) 70 - 130 mg/dL Final     Comment:     Meter: [id number] : [id number] Dilcia Erin NA   08/02/2022 0559 370 (H) 70 - 130 mg/dL Final     Comment:      Meter: [id number] : [id number] Emilie SAAVEDRA       No radiology results for the last day  Scheduled Medications  albuterol, 2.5 mg, Nebulization, 4x Daily - RT  amLODIPine, 5 mg, Oral, BID  aspirin, 81 mg, Oral, Daily  cloNIDine, 0.1 mg, Oral, Q8H  epoetin brooke-epbx, 10,000 Units, Subcutaneous, Once per day on Mon Wed Fri  folic acid, 1 mg, Oral, Daily  gabapentin, 100 mg, Oral, Daily With Lunch  hydrALAZINE, 100 mg, Oral, Q8H  insulin glargine, 10 Units, Subcutaneous, Nightly  insulin lispro, 0-14 Units, Subcutaneous, TID AC  levothyroxine, 200 mcg, Oral, Q AM  lidocaine, 1 patch, Transdermal, Q24H  losartan, 100 mg, Oral, Q24H  miconazole, , Topical, Q12H  pantoprazole, 40 mg, Oral, Daily  [START ON 8/5/2022] predniSONE, 5 mg, Oral, Daily With Breakfast  predniSONE, 7.5 mg, Oral, Daily With Breakfast  rOPINIRole, 0.75 mg, Oral, Nightly  senna-docusate sodium, 2 tablet, Oral, BID  sertraline, 150 mg, Oral, Daily  sodium chloride, 10 mL, Intravenous, Q12H  spironolactone, 25 mg, Oral, Daily  tamsulosin, 0.4 mg, Oral, Daily    Infusions   Diet  Diet Regular; Cardiac       Assessment/Plan     Active Hospital Problems    Diagnosis  POA   • **Metabolic encephalopathy [G93.41]  Unknown   • History of stroke- R MCA s/p TPA with subsequent ICH with debility [Z86.73]  Not Applicable   • Heart murmur [R01.1]  Yes   • Bradycardia [R00.1]  Yes   • Hyponatremia [E87.1]  Yes   • Anemia due to chronic kidney disease, on chronic dialysis ([facility]) [N18.6, D63.1, Z99.2]  Not Applicable   • Chronic diastolic CHF (congestive heart failure) ([facility]) [I50.32]  Yes   • ESRD (end stage renal disease) ([facility]) [N18.6]  Yes   • Hypothyroidism [E03.9]  Yes   • Esophageal dysmotility [K22.4]  Yes   • Rheumatoid arthritis ([facility]) [M06.9]  Yes   • Type 2 diabetes mellitus with kidney complication, with long-term current use of insulin (HCC) [E11.29, Z79.4]  Not Applicable      Resolved Hospital Problems   No resolved problems to display.   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  - continue current regimen    ESRD  - on HD per nephrology, appreciate recs    Type 2 DM  - brittle-had recent episodes of hypoglycemia-bedtime snack encouraged  - continue current regimen    HTN  - continue on current regimen  - complicated by bradycardia which limits medication options  - seems to be improving with volume removal    Elevated Troponin/dyspnea on exertion  - stable, no evidence of ACS  - echocardiogram noted, unlikely ischemic-probably related to volume overload, BP, and valvular disease-dry weight challenge per nephrology  - appreciate cardiology recs    Rheumatoid Arthritis  - on prednisone    SCDs for DVT prophylaxis.  Full code.  Discussed with patient, nursing staff, consulting provider and care team on multidisciplinary rounds.  Anticipate discharge to SNU facility in 1-2 days.      Mat Marte MD  [facility]  08/03/22  19:55 EDT     FAMILY HISTORY:  No pertinent family history in first degree relatives

## 2022-08-03 NOTE — THERAPY TREATMENT NOTE
Patient Name: Zaina Martinez  : 1965    MRN: [id number]                              Today's Date: 8/3/2022       Admit Date: 2022    Visit Dx:     ICD-10-CM ICD-9-CM   1. ESRD (end stage renal disease) ([facility])  N18.6 585.6   2. Altered mental status, unspecified altered mental status type  R41.82 780.97   3. Hyperglycemia  R73.9 790.29   4. Hyponatremia  E87.1 276.1   5. Pneumonia of left lung due to infectious organism, unspecified part of lung  J18.9 486   6. Chronic anemia  D64.9 285.9     Patient Active Problem List   Diagnosis   • Renal insufficiency   • Hypertensive disorder   • Hypothyroidism   • Type 2 diabetes mellitus with kidney complication, with long-term current use of insulin ([facility])   • Rheumatoid arthritis ([facility])   • Angioedema   • Esophageal dysmotility   • Anemia   • Medically noncompliant   • Myocardial infarction due to demand ischemia ([facility])   • Enteritis   • PRES (posterior reversible encephalopathy syndrome)   • Urine retention   • Klebsiella infection   • Superficial thrombophlebitis   • Generalized weakness   • ESRD (end stage renal disease) ([facility])   • CAD (coronary artery disease)   • Abnormal urinalysis   • Chronic diastolic CHF (congestive heart failure) ([facility])   • Pyelonephritis   • Calculus of gallbladder with acute on chronic cholecystitis without obstruction   • Pleural effusion on right   • Anemia due to chronic kidney disease, on chronic dialysis ([facility])   • Abnormal findings on diagnostic imaging of other specified body structures   • Acute upper respiratory infection   • Agitation   • Alkaline phosphatase raised   • Casts present in urine   • Cellulitis of toe   • Hip pain   • Community acquired pneumonia   • Depressive disorder   • Diarrhea of presumed infectious origin   • Difficult or painful urination   • Disease due to severe acute respiratory syndrome coronavirus 2 (SARS-CoV-2)   • Dyspnea   • Encounter for follow-up examination after completed treatment for conditions  other than malignant neoplasm   • H/O: hypothyroidism   • Hyperlipidemia   • Hypomagnesemia   • Intractable vomiting with nausea   • Leukocytosis   • Luetscher's syndrome   • Need for influenza vaccination   • Restless legs   • Noncompliance with treatment   • Shoulder pain   • Urinary tract infectious disease   • Metabolic encephalopathy   • Abnormal findings on diagnostic imaging of abdomen   • Status post cholecystectomy   • Hyponatremia   • Leukocytosis   • Acute metabolic encephalopathy   • Encephalopathy, toxic   • Acute CVA (cerebrovascular accident) (HCC)   • Intracranial hemorrhage (HCC)   • Stroke (HCC)   • Abnormal urinalysis   • Diabetic muscle infarction (HCC)   • Other insomnia   • Altered mental status   • History of stroke- R MCA s/p TPA with subsequent ICH with debility   • Heart murmur   • Bradycardia   • Left lower lobe pneumonia   • Metabolic encephalopathy     Past Medical History:   Diagnosis Date   • Acute CVA (cerebrovascular accident) (HCC) 5/1/2022   • Acute on chronic diastolic CHF (congestive heart failure) (HCC)    • CAD (coronary artery disease) 12/20/2021   • Diabetes (HCC)    • Disease of thyroid gland    • GERD (gastroesophageal reflux disease)    • Hyperlipidemia 11/30/2018   • Hypertension    • Rheumatoid arthritis (HCC)      Past Surgical History:   Procedure Laterality Date   • CHOLECYSTECTOMY WITH INTRAOPERATIVE CHOLANGIOGRAM N/A 1/10/2022    Procedure: Laparoscopic cholecystectomy with intraoperative cholangiogram;  Surgeon: Ramana Raygoza MD;  Location: [facility];  Service: General;  Laterality: N/A;   • EYE SURGERY     • HYSTERECTOMY     • INSERTION HEMODIALYSIS CATHETER N/A 12/6/2021    Procedure: HEMODIALYSIS CATHETER INSERTION;  Surgeon: Keli Salazar MD;  Location: [facility];  Service: Vascular;  Laterality: N/A;   • INSERTION HEMODIALYSIS CATHETER N/A 5/3/2022    Procedure: TUNNELED CATHETER PLACEMENT;  Surgeon: Keli Salazar MD;   Location: [facility] MAIN OR;  Service: Vascular;  Laterality: N/A;   • MUSCLE BIOPSY Left 6/15/2022    Procedure: Left quadriceps muscle biopsy;  Surgeon: Marques Ma MD;  Location: [facility] MAIN OR;  Service: Neurosurgery;  Laterality: Left;      General Information     [facility] Name 08/03/22 1243          OT Time and Intention    Document Type therapy note (daily note)  -     Mode of Treatment occupational therapy  -     Row Name 08/03/22 1243          General Information    Patient Profile Reviewed yes  -     Existing Precautions/Restrictions fall  -     Barriers to Rehab none identified  -[facility] Name 08/03/22 1243          Cognition    Orientation Status (Cognition) oriented x 3  -[facility] Name 08/03/22 1243          Safety Issues, Functional Mobility    Impairments Affecting Function (Mobility) endurance/activity tolerance;strength;balance;pain;range of motion (ROM)  -           User Key  (r) = Recorded By, (t) = Taken By, (c) = Cosigned By    Initials Name Provider Type     Fany Simms OT Occupational Therapist                 Mobility/ADL's     Row Name 08/03/22 1243          Bed Mobility    Bed Mobility supine-sit  -     Supine-Sit [place] (Bed Mobility) contact guard  -     Assistive Device (Bed Mobility) head of bed elevated  -[facility] Name 08/03/22 1243          Transfers    Transfers sit-stand transfer;toilet transfer  -     Sit-Stand [place] (Transfers) contact guard;verbal cues  -     [place] Level (Toilet Transfer) moderate assist (50% patient effort);verbal cues  -     Assistive Device (Toilet Transfer) walker, front-wheeled  -[facility] Name 08/03/22 1243          Sit-Stand Transfer    Assistive Device (Sit-Stand Transfers) walker, front-wheeled  -[facility] Name 08/03/22 1243          Toilet Transfer    Type (Toilet Transfer) stand pivot/stand step  -     Comment, (Toilet Transfer) ModA from low seat height  -[facility] Name 08/03/22 1248           Functional Mobility    Functional Mobility- Ind. Level contact guard assist  -     Functional Mobility- Device walker, front-wheeled  -     Functional Mobility- Comment fxl ambulation to bathroom using RW with CGA  -     Row Name 08/03/22 1243          Activities of Daily Living    BADL Assessment/Intervention grooming;toileting  -     Row Name 08/03/22 1243          Toileting Assessment/Training    [place] Level (Toileting) adjust/manage clothing;perform perineal hygiene;minimum assist (75% patient effort)  -     Comment, (Toileting) Ranulfo for clothing mgt in standing  -     Row Name 08/03/22 1243          Grooming Assessment/Training    [place] Level (Grooming) grooming skills;oral care regimen;set up;supervision  -     Position (Grooming) sink side;supported standing  -     Comment, (Grooming) standing at sink ~5 mins to complete grooming tasks  -           User Key  (r) = Recorded By, (t) = Taken By, (c) = Cosigned By    Initials Name Provider Type    Fany Tomas OT Occupational Therapist               Obj/Interventions     Row Name 08/03/22 1248          Balance    Static Sitting Balance supervision  -     Position, Sitting Balance sitting edge of bed  -     Static Standing Balance standby assist  -     Dynamic Standing Balance contact guard  -     Balance Interventions sitting;standing;occupation based/functional task  -           User Key  (r) = Recorded By, (t) = Taken By, (c) = Cosigned By    Initials Name Provider Type    Fany Tomas OT Occupational Therapist               Goals/Plan    No documentation.                Clinical Impression     Row Name 08/03/22 1249          Pain Assessment    Pre/Posttreatment Pain Comment reports pain in RLE but does not rate  -     Pain Intervention(s) Repositioned;Ambulation/increased activity  -     Row Name 08/03/22 1249          Plan of Care Review    Plan of Care Reviewed With patient  -     Progress improving  -      Outcome Evaluation Pt was found supine in bed, pleasant and agreeable to OOB ax. CGA for bed mobility and fxl ambulation into bathroom using RW with CGA. She stands sinkside ~5 mins to participate in grooming tasks with set-upA. Toilet TF with modA d/t low seat height, Philip for clothing mgt. She amb around bed to chair with CGA, does report fatigue s/p activity. Pt demo's improvement in fxl mobility and indep with ADLs but continues to demo impaired endurance/act tolerance and strength.  -     Row Name 08/03/22 1249          Positioning and Restraints    Pre-Treatment Position in bed  -JW     Post Treatment Position chair  -JW     In Chair notified nsg;sitting;call light within reach;encouraged to call for assist;exit alarm on  -           User Key  (r) = Recorded By, (t) = Taken By, (c) = Cosigned By    Initials Name Provider Type    Fany Tomas OT Occupational Therapist               Outcome Measures    No documentation.                 Occupational Therapy Education                 Title: PT OT SLP Therapies (Done)     Topic: Occupational Therapy (Done)     Point: ADL training (Done)     Description:   Instruct learner(s) on proper safety adaptation and remediation techniques during self care or transfers.   Instruct in proper use of assistive devices.              Learning Progress Summary           Patient Acceptance, E,D, Bed IU by JAIME at 7/28/2022 1333    Comment: role of OT, plan of care, benefit of activity, fall light use, call light use and call for help to get up .                   Point: Precautions (Done)     Description:   Instruct learner(s) on prescribed precautions during self-care and functional transfers.              Learning Progress Summary           Patient Acceptance, E,D, Bed IU by JAIME at 7/28/2022 1333    Comment: role of OT, plan of care, benefit of activity, fall light use, call light use and call for help to get up .                   Point: Body mechanics (Done)      Description:   Instruct learner(s) on proper positioning and spine alignment during self-care, functional mobility activities and/or exercises.              Learning Progress Summary           Patient Acceptance, E,D, Bed IU by JAIME at 7/28/2022 1333    Comment: role of OT, plan of care, benefit of activity, fall light use, call light use and call for help to get up .                               User Key     Initials Effective Dates Name Provider Type Discipline     06/16/21 -  Liz Liu OTR Occupational Therapist OT              OT Recommendation and Plan     Plan of Care Review  Plan of Care Reviewed With: patient  Progress: improving  Outcome Evaluation: Pt was found supine in bed, pleasant and agreeable to OOB ax. CGA for bed mobility and fxl ambulation into bathroom using RW with CGA. She stands sinkside ~5 mins to participate in grooming tasks with set-upA. Toilet TF with modA d/t low seat height, Philip for clothing mgt. She amb around bed to chair with CGA, does report fatigue s/p activity. Pt demo's improvement in fxl mobility and indep with ADLs but continues to demo impaired endurance/act tolerance and strength.     Time Calculation:    Time Calculation- OT     Row Name 08/03/22 1253             Time Calculation- OT    OT Start Time 1017  -      OT Stop Time 1050  -      OT Time Calculation (min) 33 min  -      Total Timed Code Minutes- OT 33 minute(s)  -      OT Received On 08/03/22  -      OT - Next Appointment 08/04/22  -              Timed Charges    61109 - OT Self Care/Mgmt Minutes 33  -              Total Minutes    Timed Charges Total Minutes 33  -       Total Minutes 33  -            User Key  (r) = Recorded By, (t) = Taken By, (c) = Cosigned By    Initials Name Provider Type     Fany Simms OT Occupational Therapist              Therapy Charges for Today     Code Description Service Date Service Provider Modifiers Qty    [id number] HC OT SELF CARE/MGMT/TRAIN EA 15 MIN  8/3/2022 Fany Simms, OT GO 2               Fany Simms, OT  8/3/2022

## 2022-08-03 NOTE — PLAN OF CARE
Goal Outcome Evaluation:  Plan of Care Reviewed With: patient        Progress: improving   VSS on RA-2L NC. Aox4. BP improving. Patient remains bradycardic and asymptomatic. PRN pain medication given x1 for LLE pain. Plan HD today.

## 2022-08-04 ENCOUNTER — APPOINTMENT (OUTPATIENT)
Dept: GENERAL RADIOLOGY | Facility: HOSPITAL | Age: 57
End: 2022-08-04

## 2022-08-04 ENCOUNTER — READMISSION MANAGEMENT (OUTPATIENT)
Dept: CALL CENTER | Facility: HOSPITAL | Age: 57
End: 2022-08-04

## 2022-08-04 VITALS
HEART RATE: 65 BPM | OXYGEN SATURATION: 93 % | DIASTOLIC BLOOD PRESSURE: 71 MMHG | BODY MASS INDEX: 26.41 KG/M2 | TEMPERATURE: 98 F | RESPIRATION RATE: 16 BRPM | SYSTOLIC BLOOD PRESSURE: 126 MMHG | WEIGHT: 143.52 LBS | HEIGHT: 62 IN

## 2022-08-04 LAB
ANION GAP SERPL CALCULATED.3IONS-SCNC: 11.7 MMOL/L (ref 5–15)
BASOPHILS # BLD AUTO: 0.07 10*3/MM3 (ref 0–0.2)
BASOPHILS NFR BLD AUTO: 0.4 % (ref 0–1.5)
BUN SERPL-MCNC: 20 MG/DL (ref 6–20)
BUN/CREAT SERPL: 6.5 (ref 7–25)
CALCIUM SPEC-SCNC: 8 MG/DL (ref 8.6–10.5)
CHLORIDE SERPL-SCNC: 97 MMOL/L (ref 98–107)
CO2 SERPL-SCNC: 22.3 MMOL/L (ref 22–29)
CREAT SERPL-MCNC: 3.1 MG/DL (ref 0.57–1)
DEPRECATED RDW RBC AUTO: 52.4 FL (ref 37–54)
EGFRCR SERPLBLD CKD-EPI 2021: 17 ML/MIN/1.73
EOSINOPHIL # BLD AUTO: 0.02 10*3/MM3 (ref 0–0.4)
EOSINOPHIL NFR BLD AUTO: 0.1 % (ref 0.3–6.2)
ERYTHROCYTE [DISTWIDTH] IN BLOOD BY AUTOMATED COUNT: 17.2 % (ref 12.3–15.4)
GLUCOSE BLDC GLUCOMTR-MCNC: 113 MG/DL (ref 70–130)
GLUCOSE BLDC GLUCOMTR-MCNC: 225 MG/DL (ref 70–130)
GLUCOSE BLDC GLUCOMTR-MCNC: 255 MG/DL (ref 70–130)
GLUCOSE SERPL-MCNC: 104 MG/DL (ref 65–99)
HBV SURFACE AG SERPL QL IA: NORMAL
HCT VFR BLD AUTO: 26.9 % (ref 34–46.6)
HGB BLD-MCNC: 8 G/DL (ref 12–15.9)
IMM GRANULOCYTES # BLD AUTO: 0.42 10*3/MM3 (ref 0–0.05)
IMM GRANULOCYTES NFR BLD AUTO: 2.4 % (ref 0–0.5)
LYMPHOCYTES # BLD AUTO: 0.95 10*3/MM3 (ref 0.7–3.1)
LYMPHOCYTES NFR BLD AUTO: 5.4 % (ref 19.6–45.3)
MCH RBC QN AUTO: 24.9 PG (ref 26.6–33)
MCHC RBC AUTO-ENTMCNC: 29.7 G/DL (ref 31.5–35.7)
MCV RBC AUTO: 83.8 FL (ref 79–97)
MONOCYTES # BLD AUTO: 0.69 10*3/MM3 (ref 0.1–0.9)
MONOCYTES NFR BLD AUTO: 3.9 % (ref 5–12)
NEUTROPHILS NFR BLD AUTO: 15.46 10*3/MM3 (ref 1.7–7)
NEUTROPHILS NFR BLD AUTO: 87.8 % (ref 42.7–76)
NRBC BLD AUTO-RTO: 0 /100 WBC (ref 0–0.2)
PLATELET # BLD AUTO: 278 10*3/MM3 (ref 140–450)
PMV BLD AUTO: 10.8 FL (ref 6–12)
POTASSIUM SERPL-SCNC: 4.5 MMOL/L (ref 3.5–5.2)
RBC # BLD AUTO: 3.21 10*6/MM3 (ref 3.77–5.28)
SODIUM SERPL-SCNC: 131 MMOL/L (ref 136–145)
WBC NRBC COR # BLD: 17.61 10*3/MM3 (ref 3.4–10.8)

## 2022-08-04 PROCEDURE — G0378 HOSPITAL OBSERVATION PER HR: HCPCS

## 2022-08-04 PROCEDURE — 94761 N-INVAS EAR/PLS OXIMETRY MLT: CPT

## 2022-08-04 PROCEDURE — 87340 HEPATITIS B SURFACE AG IA: CPT | Performed by: INTERNAL MEDICINE

## 2022-08-04 PROCEDURE — 63710000001 PREDNISONE PER 5 MG: Performed by: INTERNAL MEDICINE

## 2022-08-04 PROCEDURE — 94664 DEMO&/EVAL PT USE INHALER: CPT

## 2022-08-04 PROCEDURE — 94760 N-INVAS EAR/PLS OXIMETRY 1: CPT

## 2022-08-04 PROCEDURE — 94799 UNLISTED PULMONARY SVC/PX: CPT

## 2022-08-04 PROCEDURE — 71046 X-RAY EXAM CHEST 2 VIEWS: CPT

## 2022-08-04 PROCEDURE — 63710000001 INSULIN LISPRO (HUMAN) PER 5 UNITS: Performed by: INTERNAL MEDICINE

## 2022-08-04 PROCEDURE — 82962 GLUCOSE BLOOD TEST: CPT

## 2022-08-04 PROCEDURE — 80048 BASIC METABOLIC PNL TOTAL CA: CPT | Performed by: STUDENT IN AN ORGANIZED HEALTH CARE EDUCATION/TRAINING PROGRAM

## 2022-08-04 PROCEDURE — 85025 COMPLETE CBC W/AUTO DIFF WBC: CPT | Performed by: STUDENT IN AN ORGANIZED HEALTH CARE EDUCATION/TRAINING PROGRAM

## 2022-08-04 PROCEDURE — 97116 GAIT TRAINING THERAPY: CPT

## 2022-08-04 PROCEDURE — 99232 SBSQ HOSP IP/OBS MODERATE 35: CPT | Performed by: INTERNAL MEDICINE

## 2022-08-04 RX ORDER — CLONIDINE HYDROCHLORIDE 0.1 MG/1
0.1 TABLET ORAL EVERY 8 HOURS SCHEDULED
Qty: 90 TABLET | Refills: 0 | Status: SHIPPED | OUTPATIENT
Start: 2022-08-04 | End: 2022-10-06 | Stop reason: HOSPADM

## 2022-08-04 RX ORDER — AMLODIPINE BESYLATE 5 MG/1
5 TABLET ORAL 2 TIMES DAILY
Qty: 60 TABLET | Refills: 0 | Status: SHIPPED | OUTPATIENT
Start: 2022-08-04 | End: 2022-10-06 | Stop reason: HOSPADM

## 2022-08-04 RX ADMIN — HYDRALAZINE HYDROCHLORIDE 100 MG: 50 TABLET, FILM COATED ORAL at 05:09

## 2022-08-04 RX ADMIN — SPIRONOLACTONE 25 MG: 25 TABLET, FILM COATED ORAL at 09:16

## 2022-08-04 RX ADMIN — OXYCODONE 5 MG: 5 TABLET ORAL at 04:01

## 2022-08-04 RX ADMIN — SERTRALINE 150 MG: 100 TABLET, FILM COATED ORAL at 09:17

## 2022-08-04 RX ADMIN — PANTOPRAZOLE SODIUM 40 MG: 40 TABLET, DELAYED RELEASE ORAL at 09:18

## 2022-08-04 RX ADMIN — MICONAZOLE NITRATE: 2 POWDER TOPICAL at 09:18

## 2022-08-04 RX ADMIN — ACETAMINOPHEN 650 MG: 325 TABLET, FILM COATED ORAL at 01:32

## 2022-08-04 RX ADMIN — FOLIC ACID 1 MG: 1 TABLET ORAL at 09:17

## 2022-08-04 RX ADMIN — ASPIRIN 81 MG: 81 TABLET, COATED ORAL at 09:16

## 2022-08-04 RX ADMIN — HYDRALAZINE HYDROCHLORIDE 100 MG: 50 TABLET, FILM COATED ORAL at 15:29

## 2022-08-04 RX ADMIN — ACETAMINOPHEN 650 MG: 325 TABLET, FILM COATED ORAL at 18:11

## 2022-08-04 RX ADMIN — CLONIDINE HYDROCHLORIDE 0.1 MG: 0.1 TABLET ORAL at 15:29

## 2022-08-04 RX ADMIN — CLONIDINE HYDROCHLORIDE 0.1 MG: 0.1 TABLET ORAL at 05:09

## 2022-08-04 RX ADMIN — TAMSULOSIN HYDROCHLORIDE 0.4 MG: 0.4 CAPSULE ORAL at 09:18

## 2022-08-04 RX ADMIN — INSULIN LISPRO 5 UNITS: 100 INJECTION, SOLUTION INTRAVENOUS; SUBCUTANEOUS at 12:29

## 2022-08-04 RX ADMIN — ALBUTEROL SULFATE 2.5 MG: 2.5 SOLUTION RESPIRATORY (INHALATION) at 07:19

## 2022-08-04 RX ADMIN — LOSARTAN POTASSIUM 100 MG: 100 TABLET, FILM COATED ORAL at 09:17

## 2022-08-04 RX ADMIN — INSULIN LISPRO 8 UNITS: 100 INJECTION, SOLUTION INTRAVENOUS; SUBCUTANEOUS at 09:16

## 2022-08-04 RX ADMIN — AMLODIPINE BESYLATE 5 MG: 5 TABLET ORAL at 09:17

## 2022-08-04 RX ADMIN — LEVOTHYROXINE SODIUM 200 MCG: 0.1 TABLET ORAL at 05:09

## 2022-08-04 RX ADMIN — PREDNISONE 7.5 MG: 1 TABLET ORAL at 09:17

## 2022-08-04 NOTE — PLAN OF CARE
Goal Outcome Evaluation:       Discharge instructions provided to and discussed with patient. Patient verbalizes understanding. Discharged by wheelchair to private vehicle. Scripts delivered by OP pharmacy

## 2022-08-04 NOTE — THERAPY TREATMENT NOTE
Patient Name: Zaina Martinez  : 1965    MRN: [id number]                              Today's Date: 2022       Admit Date: 2022    Visit Dx:     ICD-10-CM ICD-9-CM   1. ESRD (end stage renal disease) ([facility])  N18.6 585.6   2. Altered mental status, unspecified altered mental status type  R41.82 780.97   3. Hyperglycemia  R73.9 790.29   4. Hyponatremia  E87.1 276.1   5. Pneumonia of left lung due to infectious organism, unspecified part of lung  J18.9 486   6. Chronic anemia  D64.9 285.9     Patient Active Problem List   Diagnosis   • Renal insufficiency   • Hypertensive disorder   • Hypothyroidism   • Type 2 diabetes mellitus with kidney complication, with long-term current use of insulin ([facility])   • Rheumatoid arthritis ([facility])   • Angioedema   • Esophageal dysmotility   • Anemia   • Medically noncompliant   • Myocardial infarction due to demand ischemia ([facility])   • Enteritis   • PRES (posterior reversible encephalopathy syndrome)   • Urine retention   • Klebsiella infection   • Superficial thrombophlebitis   • Generalized weakness   • ESRD (end stage renal disease) ([facility])   • CAD (coronary artery disease)   • Abnormal urinalysis   • Chronic diastolic CHF (congestive heart failure) ([facility])   • Pyelonephritis   • Calculus of gallbladder with acute on chronic cholecystitis without obstruction   • Pleural effusion on right   • Anemia due to chronic kidney disease, on chronic dialysis ([facility])   • Abnormal findings on diagnostic imaging of other specified body structures   • Acute upper respiratory infection   • Agitation   • Alkaline phosphatase raised   • Casts present in urine   • Cellulitis of toe   • Hip pain   • Community acquired pneumonia   • Depressive disorder   • Diarrhea of presumed infectious origin   • Difficult or painful urination   • Disease due to severe acute respiratory syndrome coronavirus 2 (SARS-CoV-2)   • Dyspnea   • Encounter for follow-up examination after completed treatment for conditions  other than malignant neoplasm   • H/O: hypothyroidism   • Hyperlipidemia   • Hypomagnesemia   • Intractable vomiting with nausea   • Leukocytosis   • Luetscher's syndrome   • Need for influenza vaccination   • Restless legs   • Noncompliance with treatment   • Shoulder pain   • Urinary tract infectious disease   • Metabolic encephalopathy   • Abnormal findings on diagnostic imaging of abdomen   • Status post cholecystectomy   • Hyponatremia   • Leukocytosis   • Acute metabolic encephalopathy   • Encephalopathy, toxic   • Acute CVA (cerebrovascular accident) ([facility])   • Intracranial hemorrhage (HCC)   • Stroke (HCC)   • Abnormal urinalysis   • Diabetic muscle infarction (HCC)   • Other insomnia   • Altered mental status   • History of stroke- R MCA s/p TPA with subsequent ICH with debility   • Heart murmur   • Bradycardia   • Left lower lobe pneumonia   • Metabolic encephalopathy     Past Medical History:   Diagnosis Date   • Acute CVA (cerebrovascular accident) (HCC) 5/1/2022   • Acute on chronic diastolic CHF (congestive heart failure) (HCC)    • CAD (coronary artery disease) 12/20/2021   • Diabetes (HCC)    • Disease of thyroid gland    • GERD (gastroesophageal reflux disease)    • Hyperlipidemia 11/30/2018   • Hypertension    • Rheumatoid arthritis (HCC)      Past Surgical History:   Procedure Laterality Date   • CHOLECYSTECTOMY WITH INTRAOPERATIVE CHOLANGIOGRAM N/A 1/10/2022    Procedure: Laparoscopic cholecystectomy with intraoperative cholangiogram;  Surgeon: Ramana Raygoza MD;  Location: [facility];  Service: General;  Laterality: N/A;   • EYE SURGERY     • HYSTERECTOMY     • INSERTION HEMODIALYSIS CATHETER N/A 12/6/2021    Procedure: HEMODIALYSIS CATHETER INSERTION;  Surgeon: Keli Salazar MD;  Location: [facility] 18/19;  Service: Vascular;  Laterality: N/A;   • INSERTION HEMODIALYSIS CATHETER N/A 5/3/2022    Procedure: TUNNELED CATHETER PLACEMENT;  Surgeon: Keli Salazar MD;   Location: [facility] MAIN OR;  Service: Vascular;  Laterality: N/A;   • MUSCLE BIOPSY Left 6/15/2022    Procedure: Left quadriceps muscle biopsy;  Surgeon: Marques Ma MD;  Location: [facility] MAIN OR;  Service: Neurosurgery;  Laterality: Left;      General Information     Row Name 08/04/22 1238          Physical Therapy Time and Intention    Document Type therapy note (daily note)  -EB     Mode of Treatment individual therapy;physical therapy  -EB     Row Name 08/04/22 1238          General Information    Existing Precautions/Restrictions fall  -EB     Row Name 08/04/22 1238          Cognition    Orientation Status (Cognition) oriented x 3  -EB     Row Name 08/04/22 1238          Safety Issues, Functional Mobility    Impairments Affecting Function (Mobility) endurance/activity tolerance;strength;balance  -EB           User Key  (r) = Recorded By, (t) = Taken By, (c) = Cosigned By    Initials Name Provider Type    EB Luann Gutierrez PTA Physical Therapist Assistant               Mobility     Row Name 08/04/22 1238          Bed Mobility    Supine-Sit [place] (Bed Mobility) minimum assist (75% patient effort);verbal cues  -EB     Sit-Supine [place] (Bed Mobility) minimum assist (75% patient effort);verbal cues  -EB     Assistive Device (Bed Mobility) bed rails;head of bed elevated  -EB     Row Name 08/04/22 1238          Sit-Stand Transfer    Sit-Stand [place] (Transfers) contact guard;verbal cues  -EB     Assistive Device (Sit-Stand Transfers) walker, front-wheeled  -EB     Row Name 08/04/22 1238          Gait/Stairs (Locomotion)    [place] Level (Gait) contact guard;minimum assist (75% patient effort)  -EB     Assistive Device (Gait) walker, front-wheeled  -EB     Distance in Feet (Gait) 30ft  -EB     Deviations/Abnormal Patterns (Gait) kenn decreased;gait speed decreased;stride length decreased;antalgic  -EB     Bilateral Gait Deviations forward flexed posture  -EB     Comment, (Gait/Stairs)  cues for upright posture. Pt with 1 minor LOB needed Philip to recover.  -EB           User Key  (r) = Recorded By, (t) = Taken By, (c) = Cosigned By    Initials Name Provider Type    Luann Brenner PTA Physical Therapist Assistant               Obj/Interventions    No documentation.                Goals/Plan    No documentation.                Clinical Impression     Row Name 08/04/22 1241          Plan of Care Review    Plan of Care Reviewed With patient  -EB     Progress improving  -EB     Outcome Evaluation Pt tolerated treatment with no c/o fatigue. Pt is Philip with bed mobility and CGA with sit<->stand transfers. Pt was able to ambulate 30ft with rwx, CGA/Philip. Pt required verbal cues for upright posutre. Pt had 1 minor LOB and needed Philip to correct. Pt's activity tolerance limited today due to fatigue. Will continue to progress pt as able.  -EB     Row Name 08/04/22 1241          Therapy Assessment/Plan (PT)    Therapy Frequency (PT) 6 times/wk  -EB     Row Name 08/04/22 1241          Positioning and Restraints    Pre-Treatment Position in bed  -EB     Post Treatment Position bed  -EB     In Bed supine;call light within reach;encouraged to call for assist;exit alarm on  -EB           User Key  (r) = Recorded By, (t) = Taken By, (c) = Cosigned By    Initials Name Provider Type    Luann Brenner PTA Physical Therapist Assistant               Outcome Measures     Row Name 08/04/22 1247          How much help from another person do you currently need...    Turning from your back to your side while in flat bed without using bedrails? 3  -EB     Moving from lying on back to sitting on the side of a flat bed without bedrails? 3  -EB     Moving to and from a bed to a chair (including a wheelchair)? 3  -EB     Standing up from a chair using your arms (e.g., wheelchair, bedside chair)? 3  -EB     Climbing 3-5 steps with a railing? 2  -EB     To walk in hospital room? 3  -EB     AM-PAC 6 Clicks Score (PT) 17  -EB      Highest level of mobility 5 --> Static standing  -     Row Name 08/04/22 1247          Modified [place] Scale    Modified [place] Scale 4 - Moderately severe disability.  Unable to walk without assistance, and unable to attend to own bodily needs without assistance.  -EB           User Key  (r) = Recorded By, (t) = Taken By, (c) = Cosigned By    Initials Name Provider Type    Luann Brenner PTA Physical Therapist Assistant                             Physical Therapy Education                 Title: PT OT SLP Therapies (Done)     Topic: Physical Therapy (Done)     Point: Mobility training (Done)     Learning Progress Summary           Patient Acceptance, E, VU by EB at 8/4/2022 1248    Acceptance, E, VU by EB at 8/3/2022 1304    Acceptance, E,D, VU by PH at 8/2/2022 1548    Acceptance, E,D, VU,NR by MS at 7/29/2022 1528    Acceptance, E,D, VU,NR by MS at 7/28/2022 1106                   Point: Home exercise program (Done)     Learning Progress Summary           Patient Acceptance, E, VU by EB at 8/4/2022 1248    Acceptance, E, VU by EB at 8/3/2022 1304    Acceptance, E,D, VU by PH at 8/2/2022 1548    Acceptance, E,D, VU,NR by MS at 7/29/2022 1528    Acceptance, E,D, VU,NR by MS at 7/28/2022 1106                   Point: Body mechanics (Done)     Learning Progress Summary           Patient Acceptance, E, VU by EB at 8/4/2022 1248    Acceptance, E, VU by EB at 8/3/2022 1304    Acceptance, E,D, VU by PH at 8/2/2022 1548    Acceptance, E,D, VU,NR by MS at 7/29/2022 1528    Acceptance, E,D, VU,NR by MS at 7/28/2022 1106                   Point: Precautions (Done)     Learning Progress Summary           Patient Acceptance, E, VU by EB at 8/4/2022 1248    Acceptance, E, VU by EB at 8/3/2022 1304    Acceptance, E,D, VU by PH at 8/2/2022 1548    Acceptance, E,D, VU,NR by MS at 7/29/2022 1528    Acceptance, E,D, VU,NR by MS at 7/28/2022 1106                               User Key     Initials Effective Dates Name  Provider Type Discipline    MS 06/16/21 -  Mat Baptiste PT Physical Therapist PT    EB 06/16/21 -  Luann Gutierrez PTA Physical Therapist Assistant PT     06/16/21 -  Daja Rushing PTA Physical Therapist Assistant PT              PT Recommendation and Plan     Plan of Care Reviewed With: patient  Progress: improving  Outcome Evaluation: Pt tolerated treatment with no c/o fatigue. Pt is Philip with bed mobility and CGA with sit<->stand transfers. Pt was able to ambulate 30ft with rwx, CGA/Philip. Pt required verbal cues for upright posutre. Pt had 1 minor LOB and needed Philip to correct. Pt's activity tolerance limited today due to fatigue. Will continue to progress pt as able.     Time Calculation:    PT Charges     Row Name 08/04/22 1237             Time Calculation    Start Time 0942  -EB      Stop Time 0957  -EB      Time Calculation (min) 15 min  -EB      PT Received On 08/04/22  -EB      PT - Next Appointment 08/05/22  -EB              Time Calculation- PT    Total Timed Code Minutes- PT 15 minute(s)  -EB            User Key  (r) = Recorded By, (t) = Taken By, (c) = Cosigned By    Initials Name Provider Type    EB Luann Gutierrez PTA Physical Therapist Assistant              Therapy Charges for Today     Code Description Service Date Service Provider Modifiers Qty    30876725238 HC GAIT TRAINING EA 15 MIN 8/3/2022 Luann Gutierrez PTA GP 1    21776320346 HC PT THERAPEUTIC ACT EA 15 MIN 8/3/2022 Luann Gutierrez PTA GP 1    11414536089 HC GAIT TRAINING EA 15 MIN 8/4/2022 Luann Gutierrez PTA GP 1          PT G-Codes  Outcome Measure Options: AM-PAC 6 Clicks Basic Mobility (PT)  AM-PAC 6 Clicks Score (PT): 17  AM-PAC 6 Clicks Score (OT): 12  Modified [place] Scale: 4 - Moderately severe disability.  Unable to walk without assistance, and unable to attend to own bodily needs without assistance.    Luann Gutierrez PTA  8/4/2022

## 2022-08-04 NOTE — PROGRESS NOTES
LOS: 3 days   Patient Care Team:  Karson Oreilly MD as PCP - General (Family Medicine)    Chief Complaint:     F/u hypertension    Interval History:     She had dyspnea when she would lie down  Consistent with orthopnea.  She is had no chest pain or dizziness and no nausea.  As long she sitting up, she feels okay.  She did not get dialysis today.    Objective   Vital Signs  Temp:  [98.3 °F (36.8 °C)-99 °F (37.2 °C)] 98.5 °F (36.9 °C)  Heart Rate:  [56-66] 66  Resp:  [16] 16  BP: (135-161)/(69-82) 161/82    Intake/Output Summary (Last 24 hours) at 8/4/2022 0744  Last data filed at 8/3/2022 1700  Gross per 24 hour   Intake --   Output 3000 ml   Net -3000 ml       Comfortable NAD  Neck supple, no JVD or thyromegaly appreciated  S1/S2 RRR, no m/r/g  Lungs CTA B, normal effort  Abdomen S/NT/ND (+) BS, no HSM appreciated  Extremities warm, no clubbing, cyanosis, or edema  No visible or palpable skin lesions  A/Ox4, mood and affect appropriate    Results Review:      Results from last 7 days   Lab Units 08/03/22  1101 08/01/22  0549 07/31/22  0541   SODIUM mmol/L 125* 129* 134*   POTASSIUM mmol/L 4.6 4.7 4.0   CHLORIDE mmol/L 91* 95* 99   CO2 mmol/L 23.1 21.0* 26.0   BUN mg/dL 26* 28* 23*   CREATININE mg/dL 3.72* 3.63* 2.76*   GLUCOSE mg/dL 145* 301* 83   CALCIUM mg/dL 8.3* 7.6* 7.8*     Results from last 7 days   Lab Units 07/29/22  0516   TROPONIN T ng/mL 0.185*     Results from last 7 days   Lab Units 08/02/22  0442 08/01/22  1748   WBC 10*3/mm3 14.33* 14.32*   HEMOGLOBIN g/dL 8.6* 8.9*   HEMATOCRIT % 29.6* 29.5*   PLATELETS 10*3/mm3 317 312                       I reviewed the patient's new clinical results.  I personally viewed and interpreted the patient's EKG/Telemetry data        Medication Review:   albuterol, 2.5 mg, Nebulization, 4x Daily - RT  amLODIPine, 5 mg, Oral, BID  aspirin, 81 mg, Oral, Daily  cloNIDine, 0.1 mg, Oral, Q8H  epoetin brooke-epbx, 10,000 Units, Subcutaneous, Once per day on Mon Wed  Fri  folic acid, 1 mg, Oral, Daily  gabapentin, 100 mg, Oral, Daily With Lunch  hydrALAZINE, 100 mg, Oral, Q8H  insulin glargine, 10 Units, Subcutaneous, Nightly  insulin lispro, 0-14 Units, Subcutaneous, TID AC  levothyroxine, 200 mcg, Oral, Q AM  lidocaine, 1 patch, Transdermal, Q24H  losartan, 100 mg, Oral, Q24H  miconazole, , Topical, Q12H  pantoprazole, 40 mg, Oral, Daily  [START ON 8/5/2022] predniSONE, 5 mg, Oral, Daily With Breakfast  predniSONE, 7.5 mg, Oral, Daily With Breakfast  rOPINIRole, 0.75 mg, Oral, Nightly  senna-docusate sodium, 2 tablet, Oral, BID  sertraline, 150 mg, Oral, Daily  sodium chloride, 10 mL, Intravenous, Q12H  spironolactone, 25 mg, Oral, Daily  tamsulosin, 0.4 mg, Oral, Daily             Assessment & Plan       Metabolic encephalopathy    Hypothyroidism    Type 2 diabetes mellitus with kidney complication, with long-term current use of insulin ([facility])    Rheumatoid arthritis ([facility])    Esophageal dysmotility    ESRD (end stage renal disease) ([facility])    Chronic diastolic CHF (congestive heart failure) ([facility])    Anemia due to chronic kidney disease, on chronic dialysis ([facility])    Hyponatremia    History of stroke- R MCA s/p TPA with subsequent ICH with debility    Heart murmur    Bradycardia    1.  Elevated troponin.  No anginal symptoms but has multiple risk factors for coronary disease including diabetes.  Troponin is trending down and echo normal in 7/29/2022  2.  Hypertension.  on amlodipine 5 twice daily and decrease clonidine 0.1 mg every 8 hours.   3.  Bradycardia, Asymptomatic - with slight improvement.  EP saw and does not want to do pacing device at this time.    4.  End-stage renal disease, on HD  5.  Anemia - stable, repeat H/H  6.  Diabetes  7.  Encephalopathy, improved with dialysis  8.  Chronic hyponatremia  9.  Moderate to large- posteriorly situated pericardial effusion  10.  Severe tricuspid insufficiency with pulmonary hypertension.    Check chest x-ray, labs are stable,  blood pressure is better but still high.  No medication changes made for now, will follow.    Ania Agrawal MD  08/04/22  07:44 EDT

## 2022-08-04 NOTE — PLAN OF CARE
Goal Outcome Evaluation:  Plan of Care Reviewed With: patient        Progress: improving  Outcome Evaluation: Pt tolerated treatment with no c/o fatigue. Pt is Philip with bed mobility and CGA with sit<->stand transfers. Pt was able to ambulate 30ft with rwx, CGA/Philip. Pt required verbal cues for upright posutre. Pt had 1 minor LOB and needed Philip to correct. Pt's activity tolerance limited today due to fatigue. Will continue to progress pt as able.

## 2022-08-04 NOTE — PLAN OF CARE
Alert, vitals stable. Pain treated x 2. Bed alarm active. Assist x 1 and walker. Plans to DC home today. Tolerating diet well. 2L placed at pt request. Brief in place. No UOP this evening, baseline.      Problem: Adult Inpatient Plan of Care  Goal: Absence of Hospital-Acquired Illness or Injury  Intervention: Prevent Skin Injury  Recent Flowsheet Documentation  Taken 8/4/2022 0401 by Amira Cassidy RN  Body Position:   left   side-lying  Taken 8/4/2022 0150 by Amira Cassidy RN  Body Position:   side-lying   left  Taken 8/3/2022 2356 by Amira Cassidy RN  Body Position: supine  Skin Protection: adhesive use limited  Taken 8/3/2022 2212 by Amira Cassidy RN  Body Position:   left   side-lying  Taken 8/3/2022 2012 by Amira Cassidy RN  Body Position: sitting up in bed  Skin Protection: adhesive use limited   Goal Outcome Evaluation:

## 2022-08-04 NOTE — DISCHARGE SUMMARY
Patient Name: Zaina Martinez  : 1965  MRN: [id number]    Date of Admission: 2022  Date of Discharge:  2022  Primary Care Physician: Karson Oreilly MD      Chief Complaint:   Altered Mental Status      Discharge Diagnoses     Active Hospital Problems    Diagnosis  POA   • **Metabolic encephalopathy [G93.41]  Unknown   • History of stroke- R MCA s/p TPA with subsequent ICH with debility [Z86.73]  Not Applicable   • Heart murmur [R01.1]  Yes   • Bradycardia [R00.1]  Yes   • Hyponatremia [E87.1]  Yes   • Anemia due to chronic kidney disease, on chronic dialysis (HCC) [N18.6, D63.1, Z99.2]  Not Applicable   • Chronic diastolic CHF (congestive heart failure) ([facility]) [I50.32]  Yes   • ESRD (end stage renal disease) ([facility]) [N18.6]  Yes   • Hypothyroidism [E03.9]  Yes   • Esophageal dysmotility [K22.4]  Yes   • Rheumatoid arthritis ([facility]) [M06.9]  Yes   • Type 2 diabetes mellitus with kidney complication, with long-term current use of insulin ([facility]) [E11.29, Z79.4]  Not Applicable      Resolved Hospital Problems   No resolved problems to display.        Hospital Course     Ms. Martinez is a 56 y.o. female with a history of ESRD, RA, DMT2 presenting with toxic encephalopathy from gabapentin intoxication in setting of ESRD. Patient much improved after HD and only getting Gabapentin once daily. Patient previously on Gabapentin 100mg daily and 100mg twice a day as needed. Clonidine weaned. Would continue to wean as possible as outpatient. Due for HD tomorrow.    Toxic Encephalopathy  - decreased gabapentin to 100mg daily. No prns. Discussed with patient and she voiced understanding     ESRD  - due for HD tomorrow     Type 2 DM  - continue home regimen on discharge     HTN  - clonidine weaned while inpatient  - continue Losartan, Amlodipine, Hydralazine, Clonidine     Elevated Troponin/dyspnea on exertion  - stable, no evidence of ACS  - echocardiogram noted, unlikely ischemic-probably related to volume overload, BP,  and valvular disease  - follow up with Cardiology as outpatient     Rheumatoid Arthritis  - continue prednisone    At the time of discharge patient was told to take all medications as prescribed, keep all follow-up appointments, and call their doctor or return to the hospital with any worsening or concerning symptoms.    Day of Discharge     Subjective:  Patient states she feels much better today. States that her mentation has improved since admission. Denies chest pain, abdominal pain, n/v/d.     Had some dyspnea overnight and was put on oxygen, but now saturating well on room air. CXR with left middle lobe infiltrate. Patient with fever or other signs or symptoms of pneumonia. This is likely pulmonary edema that will improve with HD.      Review of Systems   As above  Physical Exam:  Temp:  [98 °F (36.7 °C)-99 °F (37.2 °C)] 98 °F (36.7 °C)  Heart Rate:  [64-66] 65  Resp:  [16] 16  BP: (126-161)/(69-82) 126/71  Body mass index is 26.25 kg/m².  Physical Exam  Constitutional:       Appearance: Normal appearance.   Eyes:      Extraocular Movements: Extraocular movements intact.      Conjunctiva/sclera: Conjunctivae normal.   Cardiovascular:      Rate and Rhythm: Normal rate and regular rhythm.      Pulses: Normal pulses.      Heart sounds: No murmur heard.  Pulmonary:      Effort: No respiratory distress.      Breath sounds: No wheezing.      Comments: Diminished in bases.  Abdominal:      General: Abdomen is flat. There is no distension.      Palpations: Abdomen is soft.      Tenderness: There is no abdominal tenderness.   Musculoskeletal:         General: No swelling or deformity.   Skin:     General: Skin is warm and dry.      Comments: Right TDC   Neurological:      General: No focal deficit present.      Mental Status: She is alert. Mental status is at baseline.   Psychiatric:         Mood and Affect: Mood normal.         Thought Content: Thought content normal.         Judgment: Judgment normal.          Consultants     Consult Orders (all) (From admission, onward)     Start     Ordered    08/02/22 1009  Inpatient Rehab Admission Consult  Once        Provider:  (Not yet assigned)    08/02/22 1009    08/01/22 0906  Cardiac Electrophysiologist Inpatient Consult  Once        Specialty:  Cardiac Electrophysiology  Provider:  Adonis Colvin MD    08/01/22 0906    07/28/22 1228  Inpatient Cardiology Consult  Once        Specialty:  Cardiology  Provider:  Ghulam Shook III, MD    07/28/22 1227    07/28/22 1227  Inpatient Pulmonology Consult  Once        Specialty:  Pulmonary Disease  Provider:  Taj Magana MD    07/28/22 1227    07/27/22 1825  Inpatient Consult to Case Management   Once        Provider:  (Not yet assigned)    07/27/22 1830    07/27/22 1556  Nephrology (on -call MD unless specified)  Once        Specialty:  Nephrology  Provider:  Ruthann Palmer MD    07/27/22 1555    07/27/22 1432  [facility] (on-call MD unless specified) Details  Once        Specialty:  Hospitalist  Provider:  (Not yet assigned)    07/27/22 1431              Procedures     Imaging Results (All)     Procedure Component Value Units Date/Time    CT Head Without Contrast [[id number]] Collected: 07/27/22 1325     Updated: 07/27/22 1431    Narrative:      CT HEAD WITHOUT CONTRAST     HISTORY: Confusion, stroke.     COMPARISON: CT head 05/09/2022.     FINDINGS: The prior CT examination demonstrated evolving  intraparenchymal hemorrhage involving the left frontal lobe superiorly.  It measured approximately 2.3 x 1.4 cm in transverse planes on  05/09/2022. There has been expected evolution with the area of high  attenuation blood products now measuring approximately 10 mm x 2 mm in  size. There is no evidence of new hemorrhage, hydrocephalus or of a  focal area of decreased attenuation to suggest acute infarction.  Moderate small vessel ischemic disease is noted.       Impression:      Expected evolution of  intraparenchymal hemorrhage involving  the left frontal lobe which has decreased in size and attenuation. There  is no evidence of new hemorrhage or of acute infarction. Further  evaluation could be performed with an MRI examination of the brain as  indicated.     Radiation dose reduction techniques were utilized, including automated  exposure control and exposure modulation based on body size.     This report was finalized on 7/27/2022 2:28 PM by Dr. Osmar Nelson M.D.       XR Chest 1 View [[id number]] Collected: 07/27/22 1309     Updated: 07/27/22 1320    Narrative:      XR CHEST 1 VW-     HISTORY: Female who is 56 years-old,  altered mental status     TECHNIQUE: Frontal view of the chest     COMPARISON:  image from CT from 07/10/2022     FINDINGS: Right-sided dual-lumen central venous catheter extends to the  proximal right atrium. The heart is enlarged. Pulmonary vasculature is  congested. Patchy infiltrate in the left midlung may represent edema  and/or pneumonia, follow-up recommended. There may be small left pleural  effusion. No pneumothorax. No acute osseous process.       Impression:      Patchy infiltrate at the left midlung, follow-up  recommended. Cardiomegaly and pulmonary vascular congestion.     This report was finalized on 7/27/2022 1:17 PM by Dr. Jared Kern M.D.             Pertinent Labs     Results from last 7 days   Lab Units 08/02/22  0442 08/01/22  1748   WBC 10*3/mm3 14.33* 14.32*   HEMOGLOBIN g/dL 8.6* 8.9*   PLATELETS 10*3/mm3 317 312     Results from last 7 days   Lab Units 08/03/22  1101 08/01/22  0549 07/31/22  0541   SODIUM mmol/L 125* 129* 134*   POTASSIUM mmol/L 4.6 4.7 4.0   CHLORIDE mmol/L 91* 95* 99   CO2 mmol/L 23.1 21.0* 26.0   BUN mg/dL 26* 28* 23*   CREATININE mg/dL 3.72* 3.63* 2.76*   GLUCOSE mg/dL 145* 301* 83   Estimated Creatinine Clearance: 15 mL/min (A) (by C-G formula based on SCr of 3.72 mg/dL (H)).  Results from last 7 days   Lab Units 08/01/22  0549  07/31/22  0541   ALBUMIN g/dL 2.60* 2.80*     Results from last 7 days   Lab Units 08/03/22  1101 08/01/22  0549 07/31/22  0541   CALCIUM mg/dL 8.3* 7.6* 7.8*   ALBUMIN g/dL  --  2.60* 2.80*   PHOSPHORUS mg/dL  --  3.5 2.9       Results from last 7 days   Lab Units 07/29/22  0516   TROPONIN T ng/mL 0.185*           Invalid input(s): LDLCALC        Test Results Pending at Discharge   None    Discharge Details        Discharge Medications      New Medications      Instructions Start Date   amLODIPine 5 MG tablet  Commonly known as: NORVASC   5 mg, Oral, 2 Times Daily         Changes to Medications      Instructions Start Date   cloNIDine 0.1 MG tablet  Commonly known as: CATAPRES  What changed:   medication strength  how much to take   0.1 mg, Oral, Every 8 Hours Scheduled      epoetin brooke-epbx 24143 UNIT/ML injection  Commonly known as: RETACRIT  What changed: additional instructions   10,000 Units, Subcutaneous, 3 Times Weekly      gabapentin 100 MG capsule  Commonly known as: NEURONTIN  What changed:   how much to take  how to take this  when to take this  Another medication with the same name was removed. Continue taking this medication, and follow the directions you see here.   Take one tab po in afternoon daily      insulin lispro 100 UNIT/ML injection  Commonly known as: ADMELOG  What changed: additional instructions   0-14 Units, Subcutaneous, 3 Times Daily Before Meals      lidocaine 5 %  Commonly known as: LIDODERM  What changed: additional instructions   1 patch, Transdermal, Every 24 Hours Scheduled, 4% patch - over the counter - Remove & Discard patch within 12 hours or as directed by MD      miconazole 2 % cream  Commonly known as: MICOTIN  What changed: additional instructions   1 application, Topical, Every 12 Hours Scheduled      oxyCODONE 5 MG immediate release tablet  Commonly known as: ROXICODONE  What changed: additional instructions   5 mg, Oral, Every 4 Hours PRN, Taper off over time       predniSONE 5 MG tablet  Commonly known as: DELTASONE  What changed:   how much to take  how to take this  when to take this   Two tabs po with breakfast July 26-July 28, 1.5 tabs with breakfast July 29-August 4, then 1 tab daily starting Friday August 5, 2022 and continue on that dose         Continue These Medications      Instructions Start Date   aspirin 81 MG EC tablet   81 mg, Oral, Daily      folic acid 1 MG tablet  Commonly known as: FOLVITE   1 mg, Oral, Daily      hydrALAZINE 100 MG tablet  Commonly known as: APRESOLINE   100 mg, Oral, Every 8 Hours Scheduled      hydrALAZINE 10 MG tablet  Commonly known as: APRESOLINE   10 mg, Oral, Every 8 Hours PRN      insulin glargine 100 UNIT/ML injection  Commonly known as: LANTUS, SEMGLEE   10 Units, Subcutaneous, Every 12 Hours Scheduled      levothyroxine 200 MCG tablet  Commonly known as: SYNTHROID, LEVOTHROID   200 mcg, Oral, Every Early Morning      losartan 100 MG tablet  Commonly known as: COZAAR   100 mg, Oral, Every 24 Hours Scheduled      pantoprazole 40 MG EC tablet  Commonly known as: PROTONIX   40 mg, Oral, Daily      polyethylene glycol 17 g packet  Commonly known as: MIRALAX   17 g, Oral, Daily PRN      rOPINIRole 0.25 MG tablet  Commonly known as: REQUIP   0.75 mg, Oral, Every Night at Bedtime      sertraline 50 MG tablet  Commonly known as: ZOLOFT   150 mg, Oral, Daily      sodium zirconium cyclosilicate 5 g pack  Commonly known as: LOKELMA   5 g, Oral, Daily      spironolactone 100 MG tablet  Commonly known as: ALDACTONE   100 mg, Oral, Daily      tamsulosin 0.4 MG capsule 24 hr capsule  Commonly known as: FLOMAX   1 capsule, Oral, Daily         Stop These Medications    acetaminophen 500 MG tablet  Commonly known as: TYLENOL     b complex-vitamin c-folic acid 0.8 MG tablet tablet     carvedilol 25 MG tablet  Commonly known as: COREG     vitamin D3 125 MCG (5000 UT) capsule capsule     zolpidem 5 MG tablet  Commonly known as: AMBIEN             Allergies   Allergen Reactions   • Contrast Dye Confusion   • Ibuprofen Other (See Comments)     Kidney disease   • Prochlorperazine Other (See Comments)     Dystonic reaction              Discharge Disposition:  Home-Health Care Svc    Discharge Diet:  Diet Order   Procedures   • Diet Regular; Cardiac       Discharge Activity:   As tolerated    CODE STATUS:    Code Status and Medical Interventions:   Ordered at: 07/27/22 1831     Code Status (Patient has no pulse and is not breathing):    CPR (Attempt to Resuscitate)     Medical Interventions (Patient has pulse or is breathing):    Full       Future Appointments   Date Time Provider Department Center   8/18/2022  9:45 AM NAZ CT 2 BH NAZ CT [facility]   8/24/2022  2:00 PM LAB CHAIR 5 [facility] [place]   8/24/2022  2:40 PM Adonis Fraga Jr., MD [facility]   8/31/2022  9:00 AM Venessa Kyle APRN [facility]     Additional Instructions for the Follow-ups that You Need to Schedule     Discharge Follow-up with PCP   As directed       Currently Documented PCP:    Karson Oreilly MD    PCP Phone Number:    [phone]     Follow Up Details: PCP 2 to 3 weeks.  Consults per their recommendations            Contact information for follow-up providers     Karson Oreilly MD .    Specialty: Family Medicine  Why: PCP 2 to 3 weeks.  Consults per their recommendations  Contact information:  [address]  [facility] [address]  [facility] [address]  [phone]             Ania Agrawal MD Follow up.    Specialty: Cardiology  Contact information:  [address]  [address]  [facility] [address]  [phone]             Ruthann Palmer MD. Call in 2 day(s).    Specialty: Nephrology  Contact information:  [address]  [address]  [facility] [address]  [phone]                   Contact information for after-discharge care     Dialysis/Infusion     [facility] .    Service: Dialysis  Contact information:  [address]  [place]  [facility] [address]  [phone]                             Additional Instructions for the Follow-ups that You Need to Schedule     Discharge Follow-up with PCP   As directed       Currently Documented PCP:    Karson Oreilly MD    PCP Phone Number:    [phone]     Follow Up Details: PCP 2 to 3 weeks.  Consults per their recommendations           Time Spent on Discharge:  Greater than 30 minutes      Eddie Givens MD  [facility]  08/04/22  14:03 EDT

## 2022-08-04 NOTE — CASE MANAGEMENT/SOCIAL WORK
Continued Stay Note  [facility]     Patient Name: Zaina Martinez  MRN: [id number]  Today's Date: 8/4/2022    Admit Date: 7/27/2022     Discharge Plan     Row Name 08/04/22 1210       Plan    Plan Home with [facility] and continued HD at [facility] [place] [facility], via spouse    Patient/Family in Agreement with Plan yes    Plan Comments CCP called Amanda Chavarria and she confirms pts chair MWF at 430pm remains available and aware of anticipated dc today and resume outpt HD tomorrow at their clinic. CCP spoke with Anna/Minnie and pt will not qualify for camacho lift due to her PT notes and pt ambulating. CCP spoke with pt and provided update, discussed private pay but encouraged pt that she doesn’t need it as she is able to walk. Pt states frustration over “being backed up against a wall”. CCP discussed dc planning, [facility] unable to accept and discussed going to SNF either [facility] or [place] and pt declines going to SNF. CCP discussed HH services and [facility] accepted. CCP explained that pt could follow up with her PCP if she was interested in a motorized wc if her physical mobility doesn’t improve to meet her needs. Explained that process. She verbalized understanding. Updated MD and RN, plan is home with [facility] and spouse to transport home and resumption of Outpt HD with [facility] [place] [facility] Blake with spouse transporting. Mira REYES/CCP               Discharge Codes    No documentation.               Expected Discharge Date and Time     Expected Discharge Date Expected Discharge Time    Aug 4, 2022             Guillermina Duffy, AMY

## 2022-08-04 NOTE — PROGRESS NOTES
[facility] Progress Note      Patient Name: Zaina Martinez  : 1965  MRN: [id number]  Primary Care Physician:  Karson Oreilly MD  Date of admission: 2022    Subjective     Interval History:     Seen and examined.  Denies shortness of air.   Denies any complaints.  On 2 L nasal cannula.    Review of Systems:   As noted above    Objective     Vitals:   Temp:  [98.3 °F (36.8 °C)-99 °F (37.2 °C)] 98.5 °F (36.9 °C)  Heart Rate:  [56-66] 66  Resp:  [16] 16  BP: (135-161)/(69-82) 161/82  Flow (L/min):  [2-3] 2    Intake/Output Summary (Last 24 hours) at 2022 0820  Last data filed at 8/3/2022 1700  Gross per 24 hour   Intake --   Output 3000 ml   Net -3000 ml       Physical Exam:    General Appearance: alert, oriented x 3, NAD, chronically ill  Skin: warm and dry  HEENT: oral mucosa normal, nonicteric sclera  Neck: supple, no JVD  Lungs: CTA, not labored on RA  Heart: RR, bradycardic, normal S1 and S2  Abdomen: soft, nontender, nondistended, BS +  : no palpable bladder  Extremities: no edema, cyanosis or clubbing  Vascular: Right chest TDC  Neuro: normal speech and mental status     Scheduled Meds:     albuterol, 2.5 mg, Nebulization, 4x Daily - RT  amLODIPine, 5 mg, Oral, BID  aspirin, 81 mg, Oral, Daily  cloNIDine, 0.1 mg, Oral, Q8H  epoetin brooke-epbx, 10,000 Units, Subcutaneous, Once per day on   folic acid, 1 mg, Oral, Daily  gabapentin, 100 mg, Oral, Daily With Lunch  hydrALAZINE, 100 mg, Oral, Q8H  insulin glargine, 10 Units, Subcutaneous, Nightly  insulin lispro, 0-14 Units, Subcutaneous, TID AC  levothyroxine, 200 mcg, Oral, Q AM  lidocaine, 1 patch, Transdermal, Q24H  losartan, 100 mg, Oral, Q24H  miconazole, , Topical, Q12H  pantoprazole, 40 mg, Oral, Daily  [START ON 2022] predniSONE, 5 mg, Oral, Daily With Breakfast  predniSONE, 7.5 mg, Oral, Daily With Breakfast  rOPINIRole, 0.75 mg, Oral, Nightly  senna-docusate sodium, 2 tablet, Oral,  BID  sertraline, 150 mg, Oral, Daily  sodium chloride, 10 mL, Intravenous, Q12H  spironolactone, 25 mg, Oral, Daily  tamsulosin, 0.4 mg, Oral, Daily      IV Meds:        Results Reviewed:   I have personally reviewed the results from the time of this admission to 8/4/2022 08:20 EDT     Results from last 7 days   Lab Units 08/03/22  1101 08/01/22  0549 07/31/22  0541   SODIUM mmol/L 125* 129* 134*   POTASSIUM mmol/L 4.6 4.7 4.0   CHLORIDE mmol/L 91* 95* 99   CO2 mmol/L 23.1 21.0* 26.0   BUN mg/dL 26* 28* 23*   CREATININE mg/dL 3.72* 3.63* 2.76*   CALCIUM mg/dL 8.3* 7.6* 7.8*   GLUCOSE mg/dL 145* 301* 83     Estimated Creatinine Clearance: 15 mL/min (A) (by C-G formula based on SCr of 3.72 mg/dL (H)).  Results from last 7 days   Lab Units 08/01/22  0549 07/31/22  0541   PHOSPHORUS mg/dL 3.5 2.9         Results from last 7 days   Lab Units 08/02/22  0442 08/01/22  1748   WBC 10*3/mm3 14.33* 14.32*   HEMOGLOBIN g/dL 8.6* 8.9*   PLATELETS 10*3/mm3 317 312           Assessment / Plan     ASSESSMENT:  1.  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 HD MWF, with stable volume and electrolytes  2.  Intracerebral bleed and altered mental status, with latter resolving. CT head showed no new findings  3.  Myoclonic jerks: resolved  4.  DM2, with labile readings  5.  Coronary artery disease  6.  Acute on chronic diastolic dysfunction .   7.  Anemia of CKD, on long-acting ANDRAE as an outpatient.    8.  Hypertension, fairly well controlled  9.  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 On ASA.      PLAN:    1.  HD tomorrow (MWF) with ongoing lowering of DW.  Ok to D/C home  2.   surveillance labs      Thank you for involving us in the care of Zaina Martinez.  Please feel free to call with any questions.    Ruthann Palmer MD  08/04/22  08:20 EDT    [facility] [place] [phone]

## 2022-08-05 NOTE — OUTREACH NOTE
Prep Survey    Flowsheet Row Responses   Orthodoxy facility patient discharged from? [place]   Is LACE score < 7 ? No   Emergency Room discharge w/ pulse ox? No   Eligibility Readm Mgmt   Discharge diagnosis Toxic Encephalopathy   Does the patient have one of the following disease processes/diagnoses(primary or secondary)? Other   Does the patient have Home health ordered? Yes   What is the Home health agency?  [facility]    Is there a DME ordered? No   Prep survey completed? Yes          KERRY FALCON - Registered Nurse

## 2022-08-08 ENCOUNTER — READMISSION MANAGEMENT (OUTPATIENT)
Dept: CALL CENTER | Facility: HOSPITAL | Age: 57
End: 2022-08-08

## 2022-08-08 NOTE — CASE MANAGEMENT/SOCIAL WORK
Case Management Discharge Note      Final Note: Home with [facility] and spouse, continued HD at [facility] [place] Miryam agustin RN/CCP    Provided Post Acute Provider List?: Yes  N/A Provider List Comment: Patient plans to discharge home  Provided Post Acute Provider Quality & Resource List?: N/A  N/A Quality & Resource List Comment: Patient plans to discharge home    Selected Continued Care - Discharged on 8/4/2022 Admission date: 7/27/2022 - Discharge disposition: Home-Health Care [facility] [place]    Destination    No services have been selected for the patient.              Durable Medical Equipment    No services have been selected for the patient.              Dialysis/Infusion     Service Provider Selected Services Address Phone Fax Patient Preferred    [facility] [address] [facility] [address] [phone] --          Home Medical Care     Service Provider Selected Services Address Phone Fax Patient Preferred    [facility]  Home Health Services [facility] [address] [address] [phone] --          Therapy    No services have been selected for the patient.              Community Resources    No services have been selected for the patient.              Community & DME    No services have been selected for the patient.                  Transportation Services  Private: Car    Final Discharge Disposition Code: 06 - home with home health care

## 2022-08-08 NOTE — OUTREACH NOTE
Medical Week 1 Survey    Flowsheet Row Responses   [facility] [place] patient discharged from? [place]   Does the patient have one of the following disease processes/diagnoses(primary or secondary)? Other   Week 1 attempt successful? No   Unsuccessful attempts Attempt 1          IJEOMA ARMAS - Licensed Nurse

## 2022-08-11 LAB
LAB AP CASE REPORT: NORMAL
Lab: NORMAL
PATH REPORT.ADDENDUM SPEC: NORMAL
PATH REPORT.FINAL DX SPEC: NORMAL
PATH REPORT.GROSS SPEC: NORMAL

## 2022-08-19 ENCOUNTER — APPOINTMENT (OUTPATIENT)
Dept: CT IMAGING | Facility: HOSPITAL | Age: 57
End: 2022-08-19

## 2022-08-19 ENCOUNTER — APPOINTMENT (OUTPATIENT)
Dept: GENERAL RADIOLOGY | Facility: HOSPITAL | Age: 57
End: 2022-08-19

## 2022-08-19 ENCOUNTER — HOSPITAL ENCOUNTER (INPATIENT)
Facility: HOSPITAL | Age: 57
LOS: 7 days | Discharge: HOME HEALTH | End: 2022-08-27
Attending: EMERGENCY MEDICINE | Admitting: INTERNAL MEDICINE

## 2022-08-19 DIAGNOSIS — R01.1 HEART MURMUR: ICD-10-CM

## 2022-08-19 DIAGNOSIS — M62.20: ICD-10-CM

## 2022-08-19 DIAGNOSIS — I63.9 CEREBROVASCULAR ACCIDENT (CVA), UNSPECIFIED MECHANISM: ICD-10-CM

## 2022-08-19 DIAGNOSIS — J90 PLEURAL EFFUSION: ICD-10-CM

## 2022-08-19 DIAGNOSIS — Z99.2 ESRD ON DIALYSIS: ICD-10-CM

## 2022-08-19 DIAGNOSIS — R06.00 DYSPNEA, UNSPECIFIED TYPE: ICD-10-CM

## 2022-08-19 DIAGNOSIS — N18.6 ESRD ON DIALYSIS: ICD-10-CM

## 2022-08-19 DIAGNOSIS — Z86.73 HISTORY OF STROKE: Chronic | ICD-10-CM

## 2022-08-19 DIAGNOSIS — I31.39 PERICARDIAL EFFUSION: Primary | ICD-10-CM

## 2022-08-19 DIAGNOSIS — R00.0 TACHYCARDIA: ICD-10-CM

## 2022-08-19 DIAGNOSIS — E11.69: ICD-10-CM

## 2022-08-19 DIAGNOSIS — G93.41 METABOLIC ENCEPHALOPATHY: ICD-10-CM

## 2022-08-19 LAB
ALBUMIN SERPL-MCNC: 3.2 G/DL (ref 3.5–5.2)
ALBUMIN/GLOB SERPL: 1.2 G/DL
ALP SERPL-CCNC: 196 U/L (ref 39–117)
ALT SERPL W P-5'-P-CCNC: 8 U/L (ref 1–33)
ANION GAP SERPL CALCULATED.3IONS-SCNC: 9.5 MMOL/L (ref 5–15)
AST SERPL-CCNC: 19 U/L (ref 1–32)
B PARAPERT DNA SPEC QL NAA+PROBE: NOT DETECTED
B PERT DNA SPEC QL NAA+PROBE: NOT DETECTED
BASOPHILS # BLD AUTO: 0.04 10*3/MM3 (ref 0–0.2)
BASOPHILS NFR BLD AUTO: 0.4 % (ref 0–1.5)
BILIRUB SERPL-MCNC: 0.4 MG/DL (ref 0–1.2)
BUN SERPL-MCNC: 11 MG/DL (ref 6–20)
BUN/CREAT SERPL: 5.3 (ref 7–25)
C PNEUM DNA NPH QL NAA+NON-PROBE: NOT DETECTED
CALCIUM SPEC-SCNC: 7.9 MG/DL (ref 8.6–10.5)
CHLORIDE SERPL-SCNC: 96 MMOL/L (ref 98–107)
CO2 SERPL-SCNC: 29.5 MMOL/L (ref 22–29)
CREAT SERPL-MCNC: 2.09 MG/DL (ref 0.57–1)
DEPRECATED RDW RBC AUTO: 57.1 FL (ref 37–54)
EGFRCR SERPLBLD CKD-EPI 2021: 27.4 ML/MIN/1.73
EOSINOPHIL # BLD AUTO: 0.04 10*3/MM3 (ref 0–0.4)
EOSINOPHIL NFR BLD AUTO: 0.4 % (ref 0.3–6.2)
ERYTHROCYTE [DISTWIDTH] IN BLOOD BY AUTOMATED COUNT: 17.9 % (ref 12.3–15.4)
FLUAV SUBTYP SPEC NAA+PROBE: NOT DETECTED
FLUBV RNA ISLT QL NAA+PROBE: NOT DETECTED
GLOBULIN UR ELPH-MCNC: 2.7 GM/DL
GLUCOSE SERPL-MCNC: 286 MG/DL (ref 65–99)
HADV DNA SPEC NAA+PROBE: NOT DETECTED
HCOV 229E RNA SPEC QL NAA+PROBE: NOT DETECTED
HCOV HKU1 RNA SPEC QL NAA+PROBE: NOT DETECTED
HCOV NL63 RNA SPEC QL NAA+PROBE: NOT DETECTED
HCOV OC43 RNA SPEC QL NAA+PROBE: NOT DETECTED
HCT VFR BLD AUTO: 34.1 % (ref 34–46.6)
HGB BLD-MCNC: 10 G/DL (ref 12–15.9)
HMPV RNA NPH QL NAA+NON-PROBE: NOT DETECTED
HOLD SPECIMEN: NORMAL
HOLD SPECIMEN: NORMAL
HPIV1 RNA ISLT QL NAA+PROBE: NOT DETECTED
HPIV2 RNA SPEC QL NAA+PROBE: NOT DETECTED
HPIV3 RNA NPH QL NAA+PROBE: NOT DETECTED
HPIV4 P GENE NPH QL NAA+PROBE: NOT DETECTED
IMM GRANULOCYTES # BLD AUTO: 0.06 10*3/MM3 (ref 0–0.05)
IMM GRANULOCYTES NFR BLD AUTO: 0.6 % (ref 0–0.5)
LYMPHOCYTES # BLD AUTO: 1.37 10*3/MM3 (ref 0.7–3.1)
LYMPHOCYTES NFR BLD AUTO: 13.2 % (ref 19.6–45.3)
M PNEUMO IGG SER IA-ACNC: NOT DETECTED
MCH RBC QN AUTO: 25.8 PG (ref 26.6–33)
MCHC RBC AUTO-ENTMCNC: 29.3 G/DL (ref 31.5–35.7)
MCV RBC AUTO: 87.9 FL (ref 79–97)
MONOCYTES # BLD AUTO: 0.88 10*3/MM3 (ref 0.1–0.9)
MONOCYTES NFR BLD AUTO: 8.5 % (ref 5–12)
NEUTROPHILS NFR BLD AUTO: 76.9 % (ref 42.7–76)
NEUTROPHILS NFR BLD AUTO: 8.02 10*3/MM3 (ref 1.7–7)
NRBC BLD AUTO-RTO: 0 /100 WBC (ref 0–0.2)
NT-PROBNP SERPL-MCNC: ABNORMAL PG/ML (ref 0–900)
PLATELET # BLD AUTO: 242 10*3/MM3 (ref 140–450)
PMV BLD AUTO: 10.2 FL (ref 6–12)
POTASSIUM SERPL-SCNC: 4.2 MMOL/L (ref 3.5–5.2)
PROT SERPL-MCNC: 5.9 G/DL (ref 6–8.5)
QT INTERVAL: 374 MS
RBC # BLD AUTO: 3.88 10*6/MM3 (ref 3.77–5.28)
RHINOVIRUS RNA SPEC NAA+PROBE: NOT DETECTED
RSV RNA NPH QL NAA+NON-PROBE: NOT DETECTED
SARS-COV-2 RNA NPH QL NAA+NON-PROBE: NOT DETECTED
SODIUM SERPL-SCNC: 135 MMOL/L (ref 136–145)
TROPONIN T SERPL-MCNC: 0.28 NG/ML (ref 0–0.03)
WBC NRBC COR # BLD: 10.41 10*3/MM3 (ref 3.4–10.8)
WHOLE BLOOD HOLD COAG: NORMAL
WHOLE BLOOD HOLD SPECIMEN: NORMAL

## 2022-08-19 PROCEDURE — 71045 X-RAY EXAM CHEST 1 VIEW: CPT

## 2022-08-19 PROCEDURE — 83880 ASSAY OF NATRIURETIC PEPTIDE: CPT | Performed by: PHYSICIAN ASSISTANT

## 2022-08-19 PROCEDURE — 84484 ASSAY OF TROPONIN QUANT: CPT | Performed by: PHYSICIAN ASSISTANT

## 2022-08-19 PROCEDURE — 99285 EMERGENCY DEPT VISIT HI MDM: CPT

## 2022-08-19 PROCEDURE — 25010000002 METHYLPREDNISOLONE PER 40 MG: Performed by: PHYSICIAN ASSISTANT

## 2022-08-19 PROCEDURE — 85025 COMPLETE CBC W/AUTO DIFF WBC: CPT | Performed by: PHYSICIAN ASSISTANT

## 2022-08-19 PROCEDURE — 93005 ELECTROCARDIOGRAM TRACING: CPT

## 2022-08-19 PROCEDURE — 93010 ELECTROCARDIOGRAM REPORT: CPT | Performed by: INTERNAL MEDICINE

## 2022-08-19 PROCEDURE — 80053 COMPREHEN METABOLIC PANEL: CPT | Performed by: PHYSICIAN ASSISTANT

## 2022-08-19 PROCEDURE — 71275 CT ANGIOGRAPHY CHEST: CPT

## 2022-08-19 PROCEDURE — 0 IOPAMIDOL PER 1 ML: Performed by: EMERGENCY MEDICINE

## 2022-08-19 PROCEDURE — 25010000002 DIPHENHYDRAMINE PER 50 MG: Performed by: PHYSICIAN ASSISTANT

## 2022-08-19 PROCEDURE — 0202U NFCT DS 22 TRGT SARS-COV-2: CPT | Performed by: PHYSICIAN ASSISTANT

## 2022-08-19 PROCEDURE — 93005 ELECTROCARDIOGRAM TRACING: CPT | Performed by: EMERGENCY MEDICINE

## 2022-08-19 RX ORDER — DIPHENHYDRAMINE HYDROCHLORIDE 50 MG/ML
50 INJECTION INTRAMUSCULAR; INTRAVENOUS ONCE
Status: COMPLETED | OUTPATIENT
Start: 2022-08-19 | End: 2022-08-19

## 2022-08-19 RX ORDER — METHYLPREDNISOLONE SODIUM SUCCINATE 40 MG/ML
40 INJECTION, POWDER, LYOPHILIZED, FOR SOLUTION INTRAMUSCULAR; INTRAVENOUS EVERY 4 HOURS
Status: COMPLETED | OUTPATIENT
Start: 2022-08-19 | End: 2022-08-20

## 2022-08-19 RX ORDER — SODIUM CHLORIDE 0.9 % (FLUSH) 0.9 %
10 SYRINGE (ML) INJECTION AS NEEDED
Status: DISCONTINUED | OUTPATIENT
Start: 2022-08-19 | End: 2022-08-27 | Stop reason: HOSPADM

## 2022-08-19 RX ADMIN — DIPHENHYDRAMINE HYDROCHLORIDE 50 MG: 50 INJECTION, SOLUTION INTRAMUSCULAR; INTRAVENOUS at 22:17

## 2022-08-19 RX ADMIN — METHYLPREDNISOLONE SODIUM SUCCINATE 40 MG: 40 INJECTION, POWDER, FOR SOLUTION INTRAMUSCULAR; INTRAVENOUS at 22:17

## 2022-08-19 RX ADMIN — IOPAMIDOL 95 ML: 755 INJECTION, SOLUTION INTRAVENOUS at 23:47

## 2022-08-20 ENCOUNTER — READMISSION MANAGEMENT (OUTPATIENT)
Dept: CALL CENTER | Facility: HOSPITAL | Age: 57
End: 2022-08-20

## 2022-08-20 ENCOUNTER — APPOINTMENT (OUTPATIENT)
Dept: ULTRASOUND IMAGING | Facility: HOSPITAL | Age: 57
End: 2022-08-20

## 2022-08-20 PROBLEM — J90 PLEURAL EFFUSION: Status: ACTIVE | Noted: 2022-08-20

## 2022-08-20 PROBLEM — I31.39 PERICARDIAL EFFUSION: Status: ACTIVE | Noted: 2022-01-01

## 2022-08-20 LAB
ALBUMIN FLD-MCNC: 1.6 G/DL
ANION GAP SERPL CALCULATED.3IONS-SCNC: 16.1 MMOL/L (ref 5–15)
APPEARANCE FLD: CLEAR
BUN SERPL-MCNC: 15 MG/DL (ref 6–20)
BUN/CREAT SERPL: 6.6 (ref 7–25)
CALCIUM SPEC-SCNC: 7.9 MG/DL (ref 8.6–10.5)
CHLORIDE SERPL-SCNC: 94 MMOL/L (ref 98–107)
CO2 SERPL-SCNC: 19.9 MMOL/L (ref 22–29)
COLOR FLD: NORMAL
CREAT SERPL-MCNC: 2.26 MG/DL (ref 0.57–1)
DEPRECATED RDW RBC AUTO: 57.8 FL (ref 37–54)
EGFRCR SERPLBLD CKD-EPI 2021: 24.9 ML/MIN/1.73
ERYTHROCYTE [DISTWIDTH] IN BLOOD BY AUTOMATED COUNT: 17.2 % (ref 12.3–15.4)
GLUCOSE BLDC GLUCOMTR-MCNC: 286 MG/DL (ref 70–130)
GLUCOSE BLDC GLUCOMTR-MCNC: 350 MG/DL (ref 70–130)
GLUCOSE BLDC GLUCOMTR-MCNC: 492 MG/DL (ref 70–130)
GLUCOSE BLDC GLUCOMTR-MCNC: 584 MG/DL (ref 70–130)
GLUCOSE BLDC GLUCOMTR-MCNC: 591 MG/DL (ref 70–130)
GLUCOSE FLD-MCNC: 545 MG/DL
GLUCOSE SERPL-MCNC: 339 MG/DL (ref 65–99)
HCT VFR BLD AUTO: 37.8 % (ref 34–46.6)
HGB BLD-MCNC: 10.9 G/DL (ref 12–15.9)
LDH FLD-CCNC: 144 U/L
LDH SERPL-CCNC: 504 U/L (ref 135–214)
LYMPHOCYTES NFR FLD MANUAL: 61 %
MCH RBC QN AUTO: 26.2 PG (ref 26.6–33)
MCHC RBC AUTO-ENTMCNC: 28.8 G/DL (ref 31.5–35.7)
MCV RBC AUTO: 90.9 FL (ref 79–97)
METHOD: NORMAL
MONOS+MACROS NFR FLD: 35 %
NEUTROPHILS NFR FLD MANUAL: 4 %
NUC CELL # FLD: 186 /MM3
PLATELET # BLD AUTO: 230 10*3/MM3 (ref 140–450)
PMV BLD AUTO: 10.6 FL (ref 6–12)
POTASSIUM SERPL-SCNC: 5.1 MMOL/L (ref 3.5–5.2)
PROT FLD-MCNC: 2.3 G/DL
RBC # BLD AUTO: 4.16 10*6/MM3 (ref 3.77–5.28)
RBC # FLD AUTO: NORMAL /MM3
SODIUM SERPL-SCNC: 130 MMOL/L (ref 136–145)
WBC NRBC COR # BLD: 9.16 10*3/MM3 (ref 3.4–10.8)

## 2022-08-20 PROCEDURE — 83615 LACTATE (LD) (LDH) ENZYME: CPT | Performed by: INTERNAL MEDICINE

## 2022-08-20 PROCEDURE — 76942 ECHO GUIDE FOR BIOPSY: CPT

## 2022-08-20 PROCEDURE — 25010000002 HEPARIN (PORCINE) PER 1000 UNITS: Performed by: HOSPITALIST

## 2022-08-20 PROCEDURE — 0 LIDOCAINE 1 % SOLUTION: Performed by: RADIOLOGY

## 2022-08-20 PROCEDURE — 63710000001 INSULIN LISPRO (HUMAN) PER 5 UNITS: Performed by: HOSPITALIST

## 2022-08-20 PROCEDURE — 25010000002 METHYLPREDNISOLONE PER 40 MG: Performed by: PHYSICIAN ASSISTANT

## 2022-08-20 PROCEDURE — 82945 GLUCOSE OTHER FLUID: CPT | Performed by: INTERNAL MEDICINE

## 2022-08-20 PROCEDURE — 25010000002 ONDANSETRON PER 1 MG: Performed by: HOSPITALIST

## 2022-08-20 PROCEDURE — 85027 COMPLETE CBC AUTOMATED: CPT | Performed by: HOSPITALIST

## 2022-08-20 PROCEDURE — 84157 ASSAY OF PROTEIN OTHER: CPT | Performed by: INTERNAL MEDICINE

## 2022-08-20 PROCEDURE — 82962 GLUCOSE BLOOD TEST: CPT

## 2022-08-20 PROCEDURE — 88112 CYTOPATH CELL ENHANCE TECH: CPT | Performed by: INTERNAL MEDICINE

## 2022-08-20 PROCEDURE — 63710000001 PREDNISONE PER 5 MG: Performed by: HOSPITALIST

## 2022-08-20 PROCEDURE — 89051 BODY FLUID CELL COUNT: CPT | Performed by: INTERNAL MEDICINE

## 2022-08-20 PROCEDURE — 80048 BASIC METABOLIC PNL TOTAL CA: CPT

## 2022-08-20 PROCEDURE — 0W9B3ZZ DRAINAGE OF LEFT PLEURAL CAVITY, PERCUTANEOUS APPROACH: ICD-10-PCS | Performed by: INTERNAL MEDICINE

## 2022-08-20 PROCEDURE — 88305 TISSUE EXAM BY PATHOLOGIST: CPT | Performed by: INTERNAL MEDICINE

## 2022-08-20 PROCEDURE — 36415 COLL VENOUS BLD VENIPUNCTURE: CPT

## 2022-08-20 PROCEDURE — 97530 THERAPEUTIC ACTIVITIES: CPT | Performed by: PHYSICAL THERAPIST

## 2022-08-20 PROCEDURE — 82042 OTHER SOURCE ALBUMIN QUAN EA: CPT | Performed by: INTERNAL MEDICINE

## 2022-08-20 PROCEDURE — 97162 PT EVAL MOD COMPLEX 30 MIN: CPT | Performed by: PHYSICAL THERAPIST

## 2022-08-20 RX ORDER — HYDRALAZINE HYDROCHLORIDE 50 MG/1
100 TABLET, FILM COATED ORAL EVERY 8 HOURS SCHEDULED
Status: DISCONTINUED | OUTPATIENT
Start: 2022-08-20 | End: 2022-08-21

## 2022-08-20 RX ORDER — UREA 10 %
3 LOTION (ML) TOPICAL NIGHTLY PRN
Status: DISCONTINUED | OUTPATIENT
Start: 2022-08-20 | End: 2022-08-27 | Stop reason: HOSPADM

## 2022-08-20 RX ORDER — AMLODIPINE BESYLATE 5 MG/1
5 TABLET ORAL 2 TIMES DAILY
Status: DISCONTINUED | OUTPATIENT
Start: 2022-08-20 | End: 2022-08-21

## 2022-08-20 RX ORDER — NICOTINE POLACRILEX 4 MG
15 LOZENGE BUCCAL
Status: DISCONTINUED | OUTPATIENT
Start: 2022-08-20 | End: 2022-08-27 | Stop reason: HOSPADM

## 2022-08-20 RX ORDER — ONDANSETRON 2 MG/ML
4 INJECTION INTRAMUSCULAR; INTRAVENOUS EVERY 6 HOURS PRN
Status: DISCONTINUED | OUTPATIENT
Start: 2022-08-20 | End: 2022-08-27 | Stop reason: HOSPADM

## 2022-08-20 RX ORDER — ASPIRIN 81 MG/1
81 TABLET ORAL DAILY
Status: DISCONTINUED | OUTPATIENT
Start: 2022-08-20 | End: 2022-08-27 | Stop reason: HOSPADM

## 2022-08-20 RX ORDER — INSULIN LISPRO 100 [IU]/ML
10 INJECTION, SOLUTION INTRAVENOUS; SUBCUTANEOUS
Status: DISCONTINUED | OUTPATIENT
Start: 2022-08-20 | End: 2022-08-27 | Stop reason: HOSPADM

## 2022-08-20 RX ORDER — FOLIC ACID 1 MG/1
1 TABLET ORAL DAILY
Status: DISCONTINUED | OUTPATIENT
Start: 2022-08-20 | End: 2022-08-27 | Stop reason: HOSPADM

## 2022-08-20 RX ORDER — SPIRONOLACTONE 100 MG/1
100 TABLET, FILM COATED ORAL DAILY
Status: DISCONTINUED | OUTPATIENT
Start: 2022-08-20 | End: 2022-08-20

## 2022-08-20 RX ORDER — TAMSULOSIN HYDROCHLORIDE 0.4 MG/1
0.4 CAPSULE ORAL DAILY
Status: DISCONTINUED | OUTPATIENT
Start: 2022-08-20 | End: 2022-08-27 | Stop reason: HOSPADM

## 2022-08-20 RX ORDER — AMLODIPINE BESYLATE 5 MG/1
5 TABLET ORAL ONCE
Status: COMPLETED | OUTPATIENT
Start: 2022-08-20 | End: 2022-08-20

## 2022-08-20 RX ORDER — PREDNISONE 10 MG/1
5 TABLET ORAL
Status: DISCONTINUED | OUTPATIENT
Start: 2022-08-20 | End: 2022-08-27 | Stop reason: HOSPADM

## 2022-08-20 RX ORDER — DEXTROSE MONOHYDRATE 25 G/50ML
25 INJECTION, SOLUTION INTRAVENOUS
Status: DISCONTINUED | OUTPATIENT
Start: 2022-08-20 | End: 2022-08-27 | Stop reason: HOSPADM

## 2022-08-20 RX ORDER — LEVOTHYROXINE SODIUM 0.1 MG/1
200 TABLET ORAL
Status: DISCONTINUED | OUTPATIENT
Start: 2022-08-20 | End: 2022-08-27 | Stop reason: HOSPADM

## 2022-08-20 RX ORDER — ROPINIROLE 0.5 MG/1
0.75 TABLET, FILM COATED ORAL NIGHTLY
Status: DISCONTINUED | OUTPATIENT
Start: 2022-08-20 | End: 2022-08-27 | Stop reason: HOSPADM

## 2022-08-20 RX ORDER — LOSARTAN POTASSIUM 100 MG/1
100 TABLET ORAL
Status: DISCONTINUED | OUTPATIENT
Start: 2022-08-20 | End: 2022-08-21

## 2022-08-20 RX ORDER — HYDRALAZINE HYDROCHLORIDE 25 MG/1
25 TABLET, FILM COATED ORAL EVERY 8 HOURS PRN
Status: DISCONTINUED | OUTPATIENT
Start: 2022-08-20 | End: 2022-08-27 | Stop reason: HOSPADM

## 2022-08-20 RX ORDER — CLONIDINE HYDROCHLORIDE 0.1 MG/1
0.1 TABLET ORAL EVERY 8 HOURS SCHEDULED
Status: DISCONTINUED | OUTPATIENT
Start: 2022-08-20 | End: 2022-08-21

## 2022-08-20 RX ORDER — ONDANSETRON 4 MG/1
4 TABLET, FILM COATED ORAL EVERY 6 HOURS PRN
Status: DISCONTINUED | OUTPATIENT
Start: 2022-08-20 | End: 2022-08-27 | Stop reason: HOSPADM

## 2022-08-20 RX ORDER — ACETAMINOPHEN 325 MG/1
650 TABLET ORAL EVERY 4 HOURS PRN
Status: DISCONTINUED | OUTPATIENT
Start: 2022-08-20 | End: 2022-08-27 | Stop reason: HOSPADM

## 2022-08-20 RX ORDER — OXYCODONE HYDROCHLORIDE 5 MG/1
5 TABLET ORAL EVERY 4 HOURS PRN
Status: DISCONTINUED | OUTPATIENT
Start: 2022-08-20 | End: 2022-08-27 | Stop reason: HOSPADM

## 2022-08-20 RX ORDER — NITROGLYCERIN 0.4 MG/1
0.4 TABLET SUBLINGUAL
Status: DISCONTINUED | OUTPATIENT
Start: 2022-08-20 | End: 2022-08-27 | Stop reason: HOSPADM

## 2022-08-20 RX ORDER — HEPARIN SODIUM 5000 [USP'U]/ML
5000 INJECTION, SOLUTION INTRAVENOUS; SUBCUTANEOUS EVERY 8 HOURS SCHEDULED
Status: DISCONTINUED | OUTPATIENT
Start: 2022-08-20 | End: 2022-08-21

## 2022-08-20 RX ORDER — GABAPENTIN 100 MG/1
100 CAPSULE ORAL
Status: DISCONTINUED | OUTPATIENT
Start: 2022-08-20 | End: 2022-08-27 | Stop reason: HOSPADM

## 2022-08-20 RX ORDER — PANTOPRAZOLE SODIUM 40 MG/1
40 TABLET, DELAYED RELEASE ORAL DAILY
Status: DISCONTINUED | OUTPATIENT
Start: 2022-08-20 | End: 2022-08-27 | Stop reason: HOSPADM

## 2022-08-20 RX ORDER — CLONIDINE HYDROCHLORIDE 0.1 MG/1
0.1 TABLET ORAL ONCE
Status: COMPLETED | OUTPATIENT
Start: 2022-08-20 | End: 2022-08-20

## 2022-08-20 RX ORDER — LIDOCAINE HYDROCHLORIDE 10 MG/ML
20 INJECTION, SOLUTION INFILTRATION; PERINEURAL ONCE
Status: COMPLETED | OUTPATIENT
Start: 2022-08-20 | End: 2022-08-20

## 2022-08-20 RX ORDER — POLYETHYLENE GLYCOL 3350 17 G/17G
17 POWDER, FOR SOLUTION ORAL DAILY PRN
Status: DISCONTINUED | OUTPATIENT
Start: 2022-08-20 | End: 2022-08-27 | Stop reason: HOSPADM

## 2022-08-20 RX ORDER — INSULIN LISPRO 100 [IU]/ML
0-14 INJECTION, SOLUTION INTRAVENOUS; SUBCUTANEOUS
Status: DISCONTINUED | OUTPATIENT
Start: 2022-08-20 | End: 2022-08-27 | Stop reason: HOSPADM

## 2022-08-20 RX ADMIN — TAMSULOSIN HYDROCHLORIDE 0.4 MG: 0.4 CAPSULE ORAL at 09:34

## 2022-08-20 RX ADMIN — INSULIN LISPRO 10 UNITS: 100 INJECTION, SOLUTION INTRAVENOUS; SUBCUTANEOUS at 17:01

## 2022-08-20 RX ADMIN — AMLODIPINE BESYLATE 5 MG: 5 TABLET ORAL at 00:21

## 2022-08-20 RX ADMIN — PREDNISONE 5 MG: 10 TABLET ORAL at 09:33

## 2022-08-20 RX ADMIN — METOPROLOL TARTRATE 5 MG: 1 INJECTION, SOLUTION INTRAVENOUS at 21:30

## 2022-08-20 RX ADMIN — Medication 1 MG: at 09:34

## 2022-08-20 RX ADMIN — HEPARIN SODIUM 5000 UNITS: 5000 INJECTION INTRAVENOUS; SUBCUTANEOUS at 15:25

## 2022-08-20 RX ADMIN — METHYLPREDNISOLONE SODIUM SUCCINATE 40 MG: 40 INJECTION, POWDER, FOR SOLUTION INTRAMUSCULAR; INTRAVENOUS at 06:24

## 2022-08-20 RX ADMIN — LOSARTAN POTASSIUM 100 MG: 100 TABLET, FILM COATED ORAL at 09:34

## 2022-08-20 RX ADMIN — Medication 3 MG: at 23:18

## 2022-08-20 RX ADMIN — Medication 10 ML: at 21:31

## 2022-08-20 RX ADMIN — HYDRALAZINE HYDROCHLORIDE 100 MG: 50 TABLET, FILM COATED ORAL at 23:18

## 2022-08-20 RX ADMIN — HYDRALAZINE HYDROCHLORIDE 100 MG: 50 TABLET, FILM COATED ORAL at 15:26

## 2022-08-20 RX ADMIN — CLONIDINE HYDROCHLORIDE 0.1 MG: 0.1 TABLET ORAL at 15:25

## 2022-08-20 RX ADMIN — INSULIN GLARGINE-YFGN 25 UNITS: 100 INJECTION, SOLUTION SUBCUTANEOUS at 21:31

## 2022-08-20 RX ADMIN — LIDOCAINE HYDROCHLORIDE 10 ML: 10 INJECTION, SOLUTION INFILTRATION; PERINEURAL at 13:18

## 2022-08-20 RX ADMIN — GABAPENTIN 100 MG: 100 CAPSULE ORAL at 12:12

## 2022-08-20 RX ADMIN — PANTOPRAZOLE SODIUM 40 MG: 40 TABLET, DELAYED RELEASE ORAL at 09:34

## 2022-08-20 RX ADMIN — AMLODIPINE BESYLATE 5 MG: 5 TABLET ORAL at 09:34

## 2022-08-20 RX ADMIN — INSULIN LISPRO 14 UNITS: 100 INJECTION, SOLUTION INTRAVENOUS; SUBCUTANEOUS at 11:37

## 2022-08-20 RX ADMIN — SODIUM ZIRCONIUM CYCLOSILICATE 5 G: 5 POWDER, FOR SUSPENSION ORAL at 09:34

## 2022-08-20 RX ADMIN — AMLODIPINE BESYLATE 5 MG: 5 TABLET ORAL at 21:31

## 2022-08-20 RX ADMIN — ONDANSETRON 4 MG: 2 INJECTION INTRAMUSCULAR; INTRAVENOUS at 23:25

## 2022-08-20 RX ADMIN — ROPINIROLE HYDROCHLORIDE 0.75 MG: 0.5 TABLET, FILM COATED ORAL at 21:30

## 2022-08-20 RX ADMIN — SERTRALINE 150 MG: 100 TABLET, FILM COATED ORAL at 09:34

## 2022-08-20 RX ADMIN — ASPIRIN 81 MG: 81 TABLET, COATED ORAL at 15:26

## 2022-08-20 RX ADMIN — METOPROLOL TARTRATE 5 MG: 1 INJECTION, SOLUTION INTRAVENOUS at 15:25

## 2022-08-20 RX ADMIN — LEVOTHYROXINE SODIUM 200 MCG: 0.1 TABLET ORAL at 09:34

## 2022-08-20 RX ADMIN — METHYLPREDNISOLONE SODIUM SUCCINATE 40 MG: 40 INJECTION, POWDER, FOR SOLUTION INTRAMUSCULAR; INTRAVENOUS at 02:28

## 2022-08-20 RX ADMIN — SPIRONOLACTONE 100 MG: 100 TABLET, FILM COATED ORAL at 09:34

## 2022-08-20 RX ADMIN — INSULIN LISPRO 14 UNITS: 100 INJECTION, SOLUTION INTRAVENOUS; SUBCUTANEOUS at 17:00

## 2022-08-20 RX ADMIN — CLONIDINE HYDROCHLORIDE 0.1 MG: 0.1 TABLET ORAL at 00:21

## 2022-08-20 RX ADMIN — HEPARIN SODIUM 5000 UNITS: 5000 INJECTION INTRAVENOUS; SUBCUTANEOUS at 23:18

## 2022-08-20 RX ADMIN — INSULIN GLARGINE-YFGN 10 UNITS: 100 INJECTION, SOLUTION SUBCUTANEOUS at 09:49

## 2022-08-20 RX ADMIN — CLONIDINE HYDROCHLORIDE 0.1 MG: 0.1 TABLET ORAL at 23:18

## 2022-08-20 NOTE — OUTREACH NOTE
Medical Week 3 Survey    Flowsheet Row Responses   [facility] patient discharged from? [place]   Does the patient have one of the following disease processes/diagnoses(primary or secondary)? Other   Week 3 attempt successful? No   Revoke Readmitted          FRANK FALCON - Registered Nurse

## 2022-08-21 ENCOUNTER — APPOINTMENT (OUTPATIENT)
Dept: CARDIOLOGY | Facility: HOSPITAL | Age: 57
End: 2022-08-21

## 2022-08-21 LAB
ALBUMIN SERPL-MCNC: 2.7 G/DL (ref 3.5–5.2)
ANION GAP SERPL CALCULATED.3IONS-SCNC: 10 MMOL/L (ref 5–15)
APTT PPP: 28.6 SECONDS (ref 22.7–35.4)
APTT PPP: >200 SECONDS (ref 22.7–35.4)
BUN SERPL-MCNC: 27 MG/DL (ref 6–20)
BUN/CREAT SERPL: 8.3 (ref 7–25)
CALCIUM SPEC-SCNC: 7.5 MG/DL (ref 8.6–10.5)
CHLORIDE SERPL-SCNC: 91 MMOL/L (ref 98–107)
CO2 SERPL-SCNC: 28 MMOL/L (ref 22–29)
CREAT SERPL-MCNC: 3.25 MG/DL (ref 0.57–1)
DEPRECATED RDW RBC AUTO: 53.7 FL (ref 37–54)
EGFRCR SERPLBLD CKD-EPI 2021: 16.1 ML/MIN/1.73
ERYTHROCYTE [DISTWIDTH] IN BLOOD BY AUTOMATED COUNT: 17.5 % (ref 12.3–15.4)
GLUCOSE BLDC GLUCOMTR-MCNC: 119 MG/DL (ref 70–130)
GLUCOSE BLDC GLUCOMTR-MCNC: 141 MG/DL (ref 70–130)
GLUCOSE BLDC GLUCOMTR-MCNC: 142 MG/DL (ref 70–130)
GLUCOSE BLDC GLUCOMTR-MCNC: 170 MG/DL (ref 70–130)
GLUCOSE BLDC GLUCOMTR-MCNC: 182 MG/DL (ref 70–130)
GLUCOSE BLDC GLUCOMTR-MCNC: 91 MG/DL (ref 70–130)
GLUCOSE SERPL-MCNC: 225 MG/DL (ref 65–99)
HBV SURFACE AG SERPL QL IA: NORMAL
HCT VFR BLD AUTO: 31.3 % (ref 34–46.6)
HGB BLD-MCNC: 9.7 G/DL (ref 12–15.9)
INR PPP: 1.21 (ref 0.9–1.1)
MAGNESIUM SERPL-MCNC: 1.7 MG/DL (ref 1.6–2.6)
MAXIMAL PREDICTED HEART RATE: 164 BPM
MCH RBC QN AUTO: 26.4 PG (ref 26.6–33)
MCHC RBC AUTO-ENTMCNC: 31 G/DL (ref 31.5–35.7)
MCV RBC AUTO: 85.3 FL (ref 79–97)
PHOSPHATE SERPL-MCNC: 3.2 MG/DL (ref 2.5–4.5)
PLATELET # BLD AUTO: 301 10*3/MM3 (ref 140–450)
PMV BLD AUTO: 10 FL (ref 6–12)
POTASSIUM SERPL-SCNC: 4.6 MMOL/L (ref 3.5–5.2)
PROTHROMBIN TIME: 15.2 SECONDS (ref 11.7–14.2)
QT INTERVAL: 392 MS
RBC # BLD AUTO: 3.67 10*6/MM3 (ref 3.77–5.28)
SODIUM SERPL-SCNC: 129 MMOL/L (ref 136–145)
STRESS TARGET HR: 139 BPM
WBC NRBC COR # BLD: 17.84 10*3/MM3 (ref 3.4–10.8)

## 2022-08-21 PROCEDURE — 85027 COMPLETE CBC AUTOMATED: CPT | Performed by: HOSPITALIST

## 2022-08-21 PROCEDURE — 93010 ELECTROCARDIOGRAM REPORT: CPT | Performed by: INTERNAL MEDICINE

## 2022-08-21 PROCEDURE — 25010000002 HEPARIN (PORCINE) 25000-0.45 UT/250ML-% SOLUTION: Performed by: INTERNAL MEDICINE

## 2022-08-21 PROCEDURE — 87340 HEPATITIS B SURFACE AG IA: CPT | Performed by: HOSPITALIST

## 2022-08-21 PROCEDURE — 93308 TTE F-UP OR LMTD: CPT | Performed by: INTERNAL MEDICINE

## 2022-08-21 PROCEDURE — 93321 DOPPLER ECHO F-UP/LMTD STD: CPT | Performed by: INTERNAL MEDICINE

## 2022-08-21 PROCEDURE — 63710000001 PREDNISONE PER 5 MG: Performed by: HOSPITALIST

## 2022-08-21 PROCEDURE — 93005 ELECTROCARDIOGRAM TRACING: CPT | Performed by: INTERNAL MEDICINE

## 2022-08-21 PROCEDURE — 93321 DOPPLER ECHO F-UP/LMTD STD: CPT

## 2022-08-21 PROCEDURE — 83735 ASSAY OF MAGNESIUM: CPT | Performed by: HOSPITALIST

## 2022-08-21 PROCEDURE — 85730 THROMBOPLASTIN TIME PARTIAL: CPT | Performed by: INTERNAL MEDICINE

## 2022-08-21 PROCEDURE — 82962 GLUCOSE BLOOD TEST: CPT

## 2022-08-21 PROCEDURE — 80069 RENAL FUNCTION PANEL: CPT | Performed by: HOSPITALIST

## 2022-08-21 PROCEDURE — 25010000002 MAGNESIUM SULFATE 2 GM/50ML SOLUTION: Performed by: INTERNAL MEDICINE

## 2022-08-21 PROCEDURE — 99222 1ST HOSP IP/OBS MODERATE 55: CPT | Performed by: INTERNAL MEDICINE

## 2022-08-21 PROCEDURE — 85610 PROTHROMBIN TIME: CPT | Performed by: INTERNAL MEDICINE

## 2022-08-21 PROCEDURE — 25010000002 HEPARIN (PORCINE) PER 1000 UNITS: Performed by: HOSPITALIST

## 2022-08-21 PROCEDURE — 25010000002 ONDANSETRON PER 1 MG: Performed by: HOSPITALIST

## 2022-08-21 PROCEDURE — 93308 TTE F-UP OR LMTD: CPT

## 2022-08-21 RX ORDER — MAGNESIUM SULFATE HEPTAHYDRATE 40 MG/ML
2 INJECTION, SOLUTION INTRAVENOUS ONCE
Status: COMPLETED | OUTPATIENT
Start: 2022-08-21 | End: 2022-08-21

## 2022-08-21 RX ORDER — HEPARIN SODIUM 10000 [USP'U]/100ML
18 INJECTION, SOLUTION INTRAVENOUS
Status: DISCONTINUED | OUTPATIENT
Start: 2022-08-21 | End: 2022-08-27

## 2022-08-21 RX ORDER — HEPARIN SODIUM 5000 [USP'U]/ML
40-80 INJECTION, SOLUTION INTRAVENOUS; SUBCUTANEOUS EVERY 6 HOURS PRN
Status: DISCONTINUED | OUTPATIENT
Start: 2022-08-21 | End: 2022-08-27

## 2022-08-21 RX ADMIN — SERTRALINE 150 MG: 100 TABLET, FILM COATED ORAL at 09:12

## 2022-08-21 RX ADMIN — Medication 1 MG: at 09:12

## 2022-08-21 RX ADMIN — TAMSULOSIN HYDROCHLORIDE 0.4 MG: 0.4 CAPSULE ORAL at 09:12

## 2022-08-21 RX ADMIN — HEPARIN SODIUM 5000 UNITS: 5000 INJECTION INTRAVENOUS; SUBCUTANEOUS at 06:30

## 2022-08-21 RX ADMIN — ASPIRIN 81 MG: 81 TABLET, COATED ORAL at 09:12

## 2022-08-21 RX ADMIN — HEPARIN SODIUM 18 UNITS/KG/HR: 10000 INJECTION, SOLUTION INTRAVENOUS at 10:59

## 2022-08-21 RX ADMIN — INSULIN GLARGINE-YFGN 25 UNITS: 100 INJECTION, SOLUTION SUBCUTANEOUS at 11:05

## 2022-08-21 RX ADMIN — ROPINIROLE HYDROCHLORIDE 0.75 MG: 0.5 TABLET, FILM COATED ORAL at 21:42

## 2022-08-21 RX ADMIN — SODIUM ZIRCONIUM CYCLOSILICATE 5 G: 5 POWDER, FOR SUSPENSION ORAL at 09:12

## 2022-08-21 RX ADMIN — MAGNESIUM SULFATE HEPTAHYDRATE 2 G: 2 INJECTION, SOLUTION INTRAVENOUS at 16:48

## 2022-08-21 RX ADMIN — ONDANSETRON 4 MG: 2 INJECTION INTRAMUSCULAR; INTRAVENOUS at 06:33

## 2022-08-21 RX ADMIN — LEVOTHYROXINE SODIUM 200 MCG: 0.1 TABLET ORAL at 06:31

## 2022-08-21 RX ADMIN — PREDNISONE 5 MG: 10 TABLET ORAL at 09:12

## 2022-08-21 RX ADMIN — PANTOPRAZOLE SODIUM 40 MG: 40 TABLET, DELAYED RELEASE ORAL at 09:12

## 2022-08-21 RX ADMIN — METOPROLOL TARTRATE 5 MG: 1 INJECTION, SOLUTION INTRAVENOUS at 02:46

## 2022-08-21 RX ADMIN — METOPROLOL TARTRATE 5 MG: 1 INJECTION, SOLUTION INTRAVENOUS at 14:38

## 2022-08-21 RX ADMIN — METOPROLOL TARTRATE 5 MG: 1 INJECTION, SOLUTION INTRAVENOUS at 21:41

## 2022-08-22 ENCOUNTER — APPOINTMENT (OUTPATIENT)
Dept: GENERAL RADIOLOGY | Facility: HOSPITAL | Age: 57
End: 2022-08-22

## 2022-08-22 LAB
ALBUMIN SERPL-MCNC: 2.5 G/DL (ref 3.5–5.2)
ANION GAP SERPL CALCULATED.3IONS-SCNC: 12 MMOL/L (ref 5–15)
APTT PPP: 52.7 SECONDS (ref 22.7–35.4)
APTT PPP: 98.6 SECONDS (ref 22.7–35.4)
BASOPHILS # BLD AUTO: 0.06 10*3/MM3 (ref 0–0.2)
BASOPHILS NFR BLD AUTO: 0.4 % (ref 0–1.5)
BUN SERPL-MCNC: 33 MG/DL (ref 6–20)
BUN/CREAT SERPL: 8 (ref 7–25)
CALCIUM SPEC-SCNC: 7.3 MG/DL (ref 8.6–10.5)
CHLORIDE SERPL-SCNC: 92 MMOL/L (ref 98–107)
CO2 SERPL-SCNC: 26 MMOL/L (ref 22–29)
CREAT SERPL-MCNC: 4.14 MG/DL (ref 0.57–1)
DEPRECATED RDW RBC AUTO: 52.9 FL (ref 37–54)
EGFRCR SERPLBLD CKD-EPI 2021: 12 ML/MIN/1.73
EOSINOPHIL # BLD AUTO: 0.14 10*3/MM3 (ref 0–0.4)
EOSINOPHIL NFR BLD AUTO: 0.9 % (ref 0.3–6.2)
ERYTHROCYTE [DISTWIDTH] IN BLOOD BY AUTOMATED COUNT: 17.9 % (ref 12.3–15.4)
GLUCOSE BLDC GLUCOMTR-MCNC: 102 MG/DL (ref 70–130)
GLUCOSE BLDC GLUCOMTR-MCNC: 136 MG/DL (ref 70–130)
GLUCOSE BLDC GLUCOMTR-MCNC: 259 MG/DL (ref 70–130)
GLUCOSE BLDC GLUCOMTR-MCNC: 51 MG/DL (ref 70–130)
GLUCOSE SERPL-MCNC: 106 MG/DL (ref 65–99)
HCT VFR BLD AUTO: 31.7 % (ref 34–46.6)
HGB BLD-MCNC: 10 G/DL (ref 12–15.9)
IMM GRANULOCYTES # BLD AUTO: 0.08 10*3/MM3 (ref 0–0.05)
IMM GRANULOCYTES NFR BLD AUTO: 0.5 % (ref 0–0.5)
LYMPHOCYTES # BLD AUTO: 3.22 10*3/MM3 (ref 0.7–3.1)
LYMPHOCYTES NFR BLD AUTO: 21.8 % (ref 19.6–45.3)
MAGNESIUM SERPL-MCNC: 1.9 MG/DL (ref 1.6–2.6)
MCH RBC QN AUTO: 26 PG (ref 26.6–33)
MCHC RBC AUTO-ENTMCNC: 31.5 G/DL (ref 31.5–35.7)
MCV RBC AUTO: 82.6 FL (ref 79–97)
MONOCYTES # BLD AUTO: 0.98 10*3/MM3 (ref 0.1–0.9)
MONOCYTES NFR BLD AUTO: 6.6 % (ref 5–12)
NEUTROPHILS NFR BLD AUTO: 10.3 10*3/MM3 (ref 1.7–7)
NEUTROPHILS NFR BLD AUTO: 69.8 % (ref 42.7–76)
NRBC BLD AUTO-RTO: 0 /100 WBC (ref 0–0.2)
PHOSPHATE SERPL-MCNC: 4.1 MG/DL (ref 2.5–4.5)
PLATELET # BLD AUTO: 314 10*3/MM3 (ref 140–450)
PMV BLD AUTO: 9.9 FL (ref 6–12)
POTASSIUM SERPL-SCNC: 5.1 MMOL/L (ref 3.5–5.2)
RBC # BLD AUTO: 3.84 10*6/MM3 (ref 3.77–5.28)
SODIUM SERPL-SCNC: 130 MMOL/L (ref 136–145)
WBC NRBC COR # BLD: 14.78 10*3/MM3 (ref 3.4–10.8)

## 2022-08-22 PROCEDURE — 85730 THROMBOPLASTIN TIME PARTIAL: CPT | Performed by: INTERNAL MEDICINE

## 2022-08-22 PROCEDURE — 63710000001 PREDNISONE PER 5 MG: Performed by: HOSPITALIST

## 2022-08-22 PROCEDURE — 71045 X-RAY EXAM CHEST 1 VIEW: CPT

## 2022-08-22 PROCEDURE — 25010000002 HEPARIN (PORCINE) 25000-0.45 UT/250ML-% SOLUTION: Performed by: INTERNAL MEDICINE

## 2022-08-22 PROCEDURE — 85025 COMPLETE CBC W/AUTO DIFF WBC: CPT | Performed by: INTERNAL MEDICINE

## 2022-08-22 PROCEDURE — 25010000002 HEPARIN (PORCINE) PER 1000 UNITS: Performed by: INTERNAL MEDICINE

## 2022-08-22 PROCEDURE — 97530 THERAPEUTIC ACTIVITIES: CPT

## 2022-08-22 PROCEDURE — 99233 SBSQ HOSP IP/OBS HIGH 50: CPT | Performed by: INTERNAL MEDICINE

## 2022-08-22 PROCEDURE — 82962 GLUCOSE BLOOD TEST: CPT

## 2022-08-22 PROCEDURE — 5A1D70Z PERFORMANCE OF URINARY FILTRATION, INTERMITTENT, LESS THAN 6 HOURS PER DAY: ICD-10-PCS | Performed by: INTERNAL MEDICINE

## 2022-08-22 PROCEDURE — 25010000002 DIGOXIN PER 500 MCG: Performed by: INTERNAL MEDICINE

## 2022-08-22 PROCEDURE — 25010000002 ONDANSETRON PER 1 MG: Performed by: HOSPITALIST

## 2022-08-22 PROCEDURE — 85730 THROMBOPLASTIN TIME PARTIAL: CPT | Performed by: HOSPITALIST

## 2022-08-22 PROCEDURE — 80069 RENAL FUNCTION PANEL: CPT | Performed by: HOSPITALIST

## 2022-08-22 PROCEDURE — 83735 ASSAY OF MAGNESIUM: CPT | Performed by: INTERNAL MEDICINE

## 2022-08-22 RX ORDER — ALBUMIN (HUMAN) 12.5 G/50ML
12.5 SOLUTION INTRAVENOUS AS NEEDED
Status: ACTIVE | OUTPATIENT
Start: 2022-08-22 | End: 2022-08-22

## 2022-08-22 RX ORDER — HEPARIN SODIUM 1000 [USP'U]/ML
3800 INJECTION, SOLUTION INTRAVENOUS; SUBCUTANEOUS AS NEEDED
Status: DISCONTINUED | OUTPATIENT
Start: 2022-08-22 | End: 2022-08-27 | Stop reason: HOSPADM

## 2022-08-22 RX ORDER — DIGOXIN 0.25 MG/ML
250 INJECTION INTRAMUSCULAR; INTRAVENOUS ONCE
Status: COMPLETED | OUTPATIENT
Start: 2022-08-22 | End: 2022-08-22

## 2022-08-22 RX ADMIN — SERTRALINE 150 MG: 100 TABLET, FILM COATED ORAL at 10:47

## 2022-08-22 RX ADMIN — LEVOTHYROXINE SODIUM 200 MCG: 0.1 TABLET ORAL at 06:49

## 2022-08-22 RX ADMIN — HEPARIN SODIUM 5200 UNITS: 5000 INJECTION INTRAVENOUS; SUBCUTANEOUS at 12:14

## 2022-08-22 RX ADMIN — ONDANSETRON 4 MG: 2 INJECTION INTRAMUSCULAR; INTRAVENOUS at 03:36

## 2022-08-22 RX ADMIN — PANTOPRAZOLE SODIUM 40 MG: 40 TABLET, DELAYED RELEASE ORAL at 10:45

## 2022-08-22 RX ADMIN — Medication 3 MG: at 00:16

## 2022-08-22 RX ADMIN — Medication 1 MG: at 10:46

## 2022-08-22 RX ADMIN — ROPINIROLE HYDROCHLORIDE 0.75 MG: 0.5 TABLET, FILM COATED ORAL at 21:25

## 2022-08-22 RX ADMIN — TAMSULOSIN HYDROCHLORIDE 0.4 MG: 0.4 CAPSULE ORAL at 10:47

## 2022-08-22 RX ADMIN — METOPROLOL TARTRATE 5 MG: 1 INJECTION, SOLUTION INTRAVENOUS at 10:36

## 2022-08-22 RX ADMIN — METOPROLOL TARTRATE 25 MG: 25 TABLET, FILM COATED ORAL at 20:11

## 2022-08-22 RX ADMIN — HEPARIN SODIUM 17 UNITS/KG/HR: 10000 INJECTION, SOLUTION INTRAVENOUS at 14:19

## 2022-08-22 RX ADMIN — PREDNISONE 5 MG: 10 TABLET ORAL at 08:21

## 2022-08-22 RX ADMIN — ONDANSETRON 4 MG: 2 INJECTION INTRAMUSCULAR; INTRAVENOUS at 12:42

## 2022-08-22 RX ADMIN — HEPARIN SODIUM 3800 UNITS: 1000 INJECTION INTRAVENOUS; SUBCUTANEOUS at 18:16

## 2022-08-22 RX ADMIN — DIGOXIN 250 MCG: 250 INJECTION, SOLUTION INTRAMUSCULAR; INTRAVENOUS at 20:09

## 2022-08-22 RX ADMIN — METOPROLOL TARTRATE 5 MG: 1 INJECTION, SOLUTION INTRAVENOUS at 03:36

## 2022-08-22 RX ADMIN — SODIUM ZIRCONIUM CYCLOSILICATE 5 G: 5 POWDER, FOR SUSPENSION ORAL at 10:49

## 2022-08-22 RX ADMIN — ASPIRIN 81 MG: 81 TABLET, COATED ORAL at 10:45

## 2022-08-22 RX ADMIN — GABAPENTIN 100 MG: 100 CAPSULE ORAL at 12:44

## 2022-08-23 LAB
ALBUMIN SERPL-MCNC: 2.6 G/DL (ref 3.5–5.2)
ANION GAP SERPL CALCULATED.3IONS-SCNC: 10.1 MMOL/L (ref 5–15)
APTT PPP: 132.2 SECONDS (ref 22.7–35.4)
APTT PPP: 169.8 SECONDS (ref 22.7–35.4)
APTT PPP: 54.3 SECONDS (ref 22.7–35.4)
BASOPHILS # BLD AUTO: 0.06 10*3/MM3 (ref 0–0.2)
BASOPHILS NFR BLD AUTO: 0.5 % (ref 0–1.5)
BUN SERPL-MCNC: 20 MG/DL (ref 6–20)
BUN/CREAT SERPL: 6.1 (ref 7–25)
CALCIUM SPEC-SCNC: 7.4 MG/DL (ref 8.6–10.5)
CHLORIDE SERPL-SCNC: 97 MMOL/L (ref 98–107)
CO2 SERPL-SCNC: 22.9 MMOL/L (ref 22–29)
CREAT SERPL-MCNC: 3.3 MG/DL (ref 0.57–1)
CYTO UR: NORMAL
DEPRECATED RDW RBC AUTO: 54.6 FL (ref 37–54)
EGFRCR SERPLBLD CKD-EPI 2021: 15.8 ML/MIN/1.73
EOSINOPHIL # BLD AUTO: 0.13 10*3/MM3 (ref 0–0.4)
EOSINOPHIL NFR BLD AUTO: 1.1 % (ref 0.3–6.2)
ERYTHROCYTE [DISTWIDTH] IN BLOOD BY AUTOMATED COUNT: 17.3 % (ref 12.3–15.4)
GLUCOSE BLDC GLUCOMTR-MCNC: 157 MG/DL (ref 70–130)
GLUCOSE BLDC GLUCOMTR-MCNC: 161 MG/DL (ref 70–130)
GLUCOSE BLDC GLUCOMTR-MCNC: 231 MG/DL (ref 70–130)
GLUCOSE BLDC GLUCOMTR-MCNC: 265 MG/DL (ref 70–130)
GLUCOSE BLDC GLUCOMTR-MCNC: 389 MG/DL (ref 70–130)
GLUCOSE SERPL-MCNC: 174 MG/DL (ref 65–99)
HCT VFR BLD AUTO: 33.6 % (ref 34–46.6)
HGB BLD-MCNC: 10.1 G/DL (ref 12–15.9)
IMM GRANULOCYTES # BLD AUTO: 0.12 10*3/MM3 (ref 0–0.05)
IMM GRANULOCYTES NFR BLD AUTO: 1 % (ref 0–0.5)
LAB AP CASE REPORT: NORMAL
LYMPHOCYTES # BLD AUTO: 2.07 10*3/MM3 (ref 0.7–3.1)
LYMPHOCYTES NFR BLD AUTO: 17 % (ref 19.6–45.3)
MCH RBC QN AUTO: 26.4 PG (ref 26.6–33)
MCHC RBC AUTO-ENTMCNC: 30.1 G/DL (ref 31.5–35.7)
MCV RBC AUTO: 88 FL (ref 79–97)
MONOCYTES # BLD AUTO: 0.91 10*3/MM3 (ref 0.1–0.9)
MONOCYTES NFR BLD AUTO: 7.5 % (ref 5–12)
NEUTROPHILS NFR BLD AUTO: 72.9 % (ref 42.7–76)
NEUTROPHILS NFR BLD AUTO: 8.9 10*3/MM3 (ref 1.7–7)
NRBC BLD AUTO-RTO: 0.1 /100 WBC (ref 0–0.2)
PATH REPORT.FINAL DX SPEC: NORMAL
PATH REPORT.GROSS SPEC: NORMAL
PHOSPHATE SERPL-MCNC: 2.9 MG/DL (ref 2.5–4.5)
PLATELET # BLD AUTO: 286 10*3/MM3 (ref 140–450)
PMV BLD AUTO: 10.2 FL (ref 6–12)
POTASSIUM SERPL-SCNC: 4.6 MMOL/L (ref 3.5–5.2)
RBC # BLD AUTO: 3.82 10*6/MM3 (ref 3.77–5.28)
SODIUM SERPL-SCNC: 130 MMOL/L (ref 136–145)
WBC NRBC COR # BLD: 12.19 10*3/MM3 (ref 3.4–10.8)

## 2022-08-23 PROCEDURE — 85730 THROMBOPLASTIN TIME PARTIAL: CPT | Performed by: INTERNAL MEDICINE

## 2022-08-23 PROCEDURE — 99232 SBSQ HOSP IP/OBS MODERATE 35: CPT | Performed by: INTERNAL MEDICINE

## 2022-08-23 PROCEDURE — 25010000002 HEPARIN (PORCINE) 25000-0.45 UT/250ML-% SOLUTION: Performed by: INTERNAL MEDICINE

## 2022-08-23 PROCEDURE — 80069 RENAL FUNCTION PANEL: CPT | Performed by: HOSPITALIST

## 2022-08-23 PROCEDURE — 63710000001 PREDNISONE PER 5 MG: Performed by: HOSPITALIST

## 2022-08-23 PROCEDURE — 99221 1ST HOSP IP/OBS SF/LOW 40: CPT | Performed by: INTERNAL MEDICINE

## 2022-08-23 PROCEDURE — 85730 THROMBOPLASTIN TIME PARTIAL: CPT | Performed by: HOSPITALIST

## 2022-08-23 PROCEDURE — 82962 GLUCOSE BLOOD TEST: CPT

## 2022-08-23 PROCEDURE — 97530 THERAPEUTIC ACTIVITIES: CPT

## 2022-08-23 PROCEDURE — 85025 COMPLETE CBC W/AUTO DIFF WBC: CPT | Performed by: INTERNAL MEDICINE

## 2022-08-23 PROCEDURE — 25010000002 ONDANSETRON PER 1 MG: Performed by: HOSPITALIST

## 2022-08-23 PROCEDURE — 25010000002 HEPARIN (PORCINE) PER 1000 UNITS: Performed by: INTERNAL MEDICINE

## 2022-08-23 PROCEDURE — 97110 THERAPEUTIC EXERCISES: CPT

## 2022-08-23 RX ADMIN — ASPIRIN 81 MG: 81 TABLET, COATED ORAL at 09:06

## 2022-08-23 RX ADMIN — PANTOPRAZOLE SODIUM 40 MG: 40 TABLET, DELAYED RELEASE ORAL at 09:06

## 2022-08-23 RX ADMIN — Medication 1 MG: at 09:06

## 2022-08-23 RX ADMIN — HEPARIN SODIUM 5200 UNITS: 5000 INJECTION INTRAVENOUS; SUBCUTANEOUS at 12:11

## 2022-08-23 RX ADMIN — ROPINIROLE HYDROCHLORIDE 0.75 MG: 0.5 TABLET, FILM COATED ORAL at 21:44

## 2022-08-23 RX ADMIN — GABAPENTIN 100 MG: 100 CAPSULE ORAL at 14:12

## 2022-08-23 RX ADMIN — Medication 3 MG: at 02:02

## 2022-08-23 RX ADMIN — LEVOTHYROXINE SODIUM 200 MCG: 0.1 TABLET ORAL at 05:29

## 2022-08-23 RX ADMIN — SODIUM ZIRCONIUM CYCLOSILICATE 5 G: 5 POWDER, FOR SUSPENSION ORAL at 09:06

## 2022-08-23 RX ADMIN — INSULIN GLARGINE-YFGN 25 UNITS: 100 INJECTION, SOLUTION SUBCUTANEOUS at 22:23

## 2022-08-23 RX ADMIN — Medication 10 ML: at 21:44

## 2022-08-23 RX ADMIN — METOPROLOL TARTRATE 25 MG: 25 TABLET, FILM COATED ORAL at 14:12

## 2022-08-23 RX ADMIN — SERTRALINE 150 MG: 100 TABLET, FILM COATED ORAL at 09:06

## 2022-08-23 RX ADMIN — METOPROLOL TARTRATE 25 MG: 25 TABLET, FILM COATED ORAL at 21:43

## 2022-08-23 RX ADMIN — HEPARIN SODIUM 18 UNITS/KG/HR: 10000 INJECTION, SOLUTION INTRAVENOUS at 17:50

## 2022-08-23 RX ADMIN — METOPROLOL TARTRATE 25 MG: 25 TABLET, FILM COATED ORAL at 05:29

## 2022-08-23 RX ADMIN — ONDANSETRON 4 MG: 2 INJECTION INTRAMUSCULAR; INTRAVENOUS at 09:43

## 2022-08-23 RX ADMIN — PREDNISONE 5 MG: 10 TABLET ORAL at 09:05

## 2022-08-23 RX ADMIN — TAMSULOSIN HYDROCHLORIDE 0.4 MG: 0.4 CAPSULE ORAL at 09:06

## 2022-08-24 ENCOUNTER — APPOINTMENT (OUTPATIENT)
Dept: LAB | Facility: HOSPITAL | Age: 57
End: 2022-08-24

## 2022-08-24 ENCOUNTER — APPOINTMENT (OUTPATIENT)
Dept: GENERAL RADIOLOGY | Facility: HOSPITAL | Age: 57
End: 2022-08-24

## 2022-08-24 LAB
ALBUMIN SERPL-MCNC: 2.7 G/DL (ref 3.5–5.2)
ANION GAP SERPL CALCULATED.3IONS-SCNC: 14 MMOL/L (ref 5–15)
APTT PPP: 50.5 SECONDS (ref 22.7–35.4)
APTT PPP: 56 SECONDS (ref 22.7–35.4)
BASOPHILS # BLD AUTO: 0.06 10*3/MM3 (ref 0–0.2)
BASOPHILS NFR BLD AUTO: 0.6 % (ref 0–1.5)
BUN SERPL-MCNC: 25 MG/DL (ref 6–20)
BUN/CREAT SERPL: 7.1 (ref 7–25)
CALCIUM SPEC-SCNC: 7.7 MG/DL (ref 8.6–10.5)
CHLORIDE SERPL-SCNC: 97 MMOL/L (ref 98–107)
CO2 SERPL-SCNC: 20 MMOL/L (ref 22–29)
CREAT SERPL-MCNC: 3.51 MG/DL (ref 0.57–1)
DEPRECATED RDW RBC AUTO: 53.6 FL (ref 37–54)
EGFRCR SERPLBLD CKD-EPI 2021: 14.7 ML/MIN/1.73
EOSINOPHIL # BLD AUTO: 0.12 10*3/MM3 (ref 0–0.4)
EOSINOPHIL NFR BLD AUTO: 1.1 % (ref 0.3–6.2)
ERYTHROCYTE [DISTWIDTH] IN BLOOD BY AUTOMATED COUNT: 17 % (ref 12.3–15.4)
GLUCOSE BLDC GLUCOMTR-MCNC: 131 MG/DL (ref 70–130)
GLUCOSE BLDC GLUCOMTR-MCNC: 134 MG/DL (ref 70–130)
GLUCOSE BLDC GLUCOMTR-MCNC: 185 MG/DL (ref 70–130)
GLUCOSE BLDC GLUCOMTR-MCNC: 186 MG/DL (ref 70–130)
GLUCOSE BLDC GLUCOMTR-MCNC: 87 MG/DL (ref 70–130)
GLUCOSE SERPL-MCNC: 144 MG/DL (ref 65–99)
HCT VFR BLD AUTO: 31.7 % (ref 34–46.6)
HGB BLD-MCNC: 9.6 G/DL (ref 12–15.9)
IMM GRANULOCYTES # BLD AUTO: 0.11 10*3/MM3 (ref 0–0.05)
IMM GRANULOCYTES NFR BLD AUTO: 1 % (ref 0–0.5)
LYMPHOCYTES # BLD AUTO: 1.98 10*3/MM3 (ref 0.7–3.1)
LYMPHOCYTES NFR BLD AUTO: 18.5 % (ref 19.6–45.3)
MCH RBC QN AUTO: 26.4 PG (ref 26.6–33)
MCHC RBC AUTO-ENTMCNC: 30.3 G/DL (ref 31.5–35.7)
MCV RBC AUTO: 87.3 FL (ref 79–97)
MONOCYTES # BLD AUTO: 0.89 10*3/MM3 (ref 0.1–0.9)
MONOCYTES NFR BLD AUTO: 8.3 % (ref 5–12)
NEUTROPHILS NFR BLD AUTO: 7.54 10*3/MM3 (ref 1.7–7)
NEUTROPHILS NFR BLD AUTO: 70.5 % (ref 42.7–76)
NRBC BLD AUTO-RTO: 0 /100 WBC (ref 0–0.2)
PHOSPHATE SERPL-MCNC: 3.1 MG/DL (ref 2.5–4.5)
PLATELET # BLD AUTO: 265 10*3/MM3 (ref 140–450)
PMV BLD AUTO: 10.3 FL (ref 6–12)
POTASSIUM SERPL-SCNC: 4.5 MMOL/L (ref 3.5–5.2)
RBC # BLD AUTO: 3.63 10*6/MM3 (ref 3.77–5.28)
SODIUM SERPL-SCNC: 131 MMOL/L (ref 136–145)
WBC NRBC COR # BLD: 10.7 10*3/MM3 (ref 3.4–10.8)

## 2022-08-24 PROCEDURE — 85025 COMPLETE CBC W/AUTO DIFF WBC: CPT | Performed by: INTERNAL MEDICINE

## 2022-08-24 PROCEDURE — 85730 THROMBOPLASTIN TIME PARTIAL: CPT | Performed by: HOSPITALIST

## 2022-08-24 PROCEDURE — 25010000002 HEPARIN (PORCINE) 25000-0.45 UT/250ML-% SOLUTION: Performed by: INTERNAL MEDICINE

## 2022-08-24 PROCEDURE — 82962 GLUCOSE BLOOD TEST: CPT

## 2022-08-24 PROCEDURE — 25010000002 HEPARIN (PORCINE) PER 1000 UNITS: Performed by: INTERNAL MEDICINE

## 2022-08-24 PROCEDURE — 63710000001 INSULIN LISPRO (HUMAN) PER 5 UNITS: Performed by: HOSPITALIST

## 2022-08-24 PROCEDURE — 71045 X-RAY EXAM CHEST 1 VIEW: CPT

## 2022-08-24 PROCEDURE — 63710000001 PREDNISONE PER 5 MG: Performed by: HOSPITALIST

## 2022-08-24 PROCEDURE — 80069 RENAL FUNCTION PANEL: CPT | Performed by: INTERNAL MEDICINE

## 2022-08-24 PROCEDURE — 99233 SBSQ HOSP IP/OBS HIGH 50: CPT | Performed by: INTERNAL MEDICINE

## 2022-08-24 RX ORDER — METOPROLOL TARTRATE 50 MG/1
50 TABLET, FILM COATED ORAL EVERY 8 HOURS
Status: DISCONTINUED | OUTPATIENT
Start: 2022-08-24 | End: 2022-08-27 | Stop reason: HOSPADM

## 2022-08-24 RX ADMIN — SERTRALINE 150 MG: 100 TABLET, FILM COATED ORAL at 13:47

## 2022-08-24 RX ADMIN — METOPROLOL TARTRATE 25 MG: 25 TABLET, FILM COATED ORAL at 13:48

## 2022-08-24 RX ADMIN — INSULIN LISPRO 10 UNITS: 100 INJECTION, SOLUTION INTRAVENOUS; SUBCUTANEOUS at 17:46

## 2022-08-24 RX ADMIN — PREDNISONE 5 MG: 10 TABLET ORAL at 13:47

## 2022-08-24 RX ADMIN — PANTOPRAZOLE SODIUM 40 MG: 40 TABLET, DELAYED RELEASE ORAL at 13:47

## 2022-08-24 RX ADMIN — METOPROLOL TARTRATE 25 MG: 25 TABLET, FILM COATED ORAL at 03:45

## 2022-08-24 RX ADMIN — LEVOTHYROXINE SODIUM 200 MCG: 0.1 TABLET ORAL at 06:07

## 2022-08-24 RX ADMIN — GABAPENTIN 100 MG: 100 CAPSULE ORAL at 13:46

## 2022-08-24 RX ADMIN — HEPARIN SODIUM 5000 UNITS: 5000 INJECTION INTRAVENOUS; SUBCUTANEOUS at 08:04

## 2022-08-24 RX ADMIN — OXYCODONE 5 MG: 5 TABLET ORAL at 23:07

## 2022-08-24 RX ADMIN — ROPINIROLE HYDROCHLORIDE 0.75 MG: 0.5 TABLET, FILM COATED ORAL at 20:55

## 2022-08-24 RX ADMIN — HEPARIN SODIUM 3800 UNITS: 1000 INJECTION INTRAVENOUS; SUBCUTANEOUS at 12:26

## 2022-08-24 RX ADMIN — Medication 1 MG: at 13:47

## 2022-08-24 RX ADMIN — METOPROLOL TARTRATE 50 MG: 50 TABLET, FILM COATED ORAL at 20:55

## 2022-08-24 RX ADMIN — HEPARIN SODIUM 5200 UNITS: 5000 INJECTION INTRAVENOUS; SUBCUTANEOUS at 18:44

## 2022-08-24 RX ADMIN — TAMSULOSIN HYDROCHLORIDE 0.4 MG: 0.4 CAPSULE ORAL at 13:46

## 2022-08-24 RX ADMIN — ASPIRIN 81 MG: 81 TABLET, COATED ORAL at 13:46

## 2022-08-24 RX ADMIN — SODIUM ZIRCONIUM CYCLOSILICATE 5 G: 5 POWDER, FOR SUSPENSION ORAL at 13:47

## 2022-08-24 RX ADMIN — HEPARIN SODIUM 23 UNITS/KG/HR: 10000 INJECTION, SOLUTION INTRAVENOUS at 18:50

## 2022-08-25 ENCOUNTER — APPOINTMENT (OUTPATIENT)
Dept: CARDIOLOGY | Facility: HOSPITAL | Age: 57
End: 2022-08-25

## 2022-08-25 ENCOUNTER — APPOINTMENT (OUTPATIENT)
Dept: GENERAL RADIOLOGY | Facility: HOSPITAL | Age: 57
End: 2022-08-25

## 2022-08-25 LAB
APTT PPP: 187.9 SECONDS (ref 22.7–35.4)
APTT PPP: 98.9 SECONDS (ref 22.7–35.4)
APTT PPP: >200 SECONDS (ref 22.7–35.4)
BASOPHILS # BLD AUTO: 0.04 10*3/MM3 (ref 0–0.2)
BASOPHILS NFR BLD AUTO: 0.4 % (ref 0–1.5)
BH CV ECHO MEAS - TR MAX PG: 27.7 MMHG
BH CV ECHO MEAS - TR MAX VEL: 263.3 CM/SEC
DEPRECATED RDW RBC AUTO: 52.4 FL (ref 37–54)
EOSINOPHIL # BLD AUTO: 0.04 10*3/MM3 (ref 0–0.4)
EOSINOPHIL NFR BLD AUTO: 0.4 % (ref 0.3–6.2)
ERYTHROCYTE [DISTWIDTH] IN BLOOD BY AUTOMATED COUNT: 16.7 % (ref 12.3–15.4)
GLUCOSE BLDC GLUCOMTR-MCNC: 167 MG/DL (ref 70–130)
GLUCOSE BLDC GLUCOMTR-MCNC: 195 MG/DL (ref 70–130)
GLUCOSE BLDC GLUCOMTR-MCNC: 258 MG/DL (ref 70–130)
GLUCOSE BLDC GLUCOMTR-MCNC: 72 MG/DL (ref 70–130)
GLUCOSE BLDC GLUCOMTR-MCNC: 96 MG/DL (ref 70–130)
HCT VFR BLD AUTO: 31.1 % (ref 34–46.6)
HGB BLD-MCNC: 9.6 G/DL (ref 12–15.9)
IMM GRANULOCYTES # BLD AUTO: 0.13 10*3/MM3 (ref 0–0.05)
IMM GRANULOCYTES NFR BLD AUTO: 1.3 % (ref 0–0.5)
LYMPHOCYTES # BLD AUTO: 1.29 10*3/MM3 (ref 0.7–3.1)
LYMPHOCYTES NFR BLD AUTO: 12.8 % (ref 19.6–45.3)
MAXIMAL PREDICTED HEART RATE: 164 BPM
MCH RBC QN AUTO: 26.2 PG (ref 26.6–33)
MCHC RBC AUTO-ENTMCNC: 30.9 G/DL (ref 31.5–35.7)
MCV RBC AUTO: 84.7 FL (ref 79–97)
MONOCYTES # BLD AUTO: 0.53 10*3/MM3 (ref 0.1–0.9)
MONOCYTES NFR BLD AUTO: 5.3 % (ref 5–12)
NEUTROPHILS NFR BLD AUTO: 79.8 % (ref 42.7–76)
NEUTROPHILS NFR BLD AUTO: 8.06 10*3/MM3 (ref 1.7–7)
NRBC BLD AUTO-RTO: 0 /100 WBC (ref 0–0.2)
PLATELET # BLD AUTO: 210 10*3/MM3 (ref 140–450)
PMV BLD AUTO: 10.6 FL (ref 6–12)
RBC # BLD AUTO: 3.67 10*6/MM3 (ref 3.77–5.28)
SARS-COV-2 RNA PNL SPEC NAA+PROBE: NOT DETECTED
STRESS TARGET HR: 139 BPM
WBC NRBC COR # BLD: 10.09 10*3/MM3 (ref 3.4–10.8)

## 2022-08-25 PROCEDURE — 93325 DOPPLER ECHO COLOR FLOW MAPG: CPT

## 2022-08-25 PROCEDURE — 99232 SBSQ HOSP IP/OBS MODERATE 35: CPT | Performed by: INTERNAL MEDICINE

## 2022-08-25 PROCEDURE — 63710000001 INSULIN LISPRO (HUMAN) PER 5 UNITS: Performed by: HOSPITALIST

## 2022-08-25 PROCEDURE — 87635 SARS-COV-2 COVID-19 AMP PRB: CPT | Performed by: INTERNAL MEDICINE

## 2022-08-25 PROCEDURE — 87040 BLOOD CULTURE FOR BACTERIA: CPT | Performed by: HOSPITALIST

## 2022-08-25 PROCEDURE — 85025 COMPLETE CBC W/AUTO DIFF WBC: CPT | Performed by: INTERNAL MEDICINE

## 2022-08-25 PROCEDURE — 93308 TTE F-UP OR LMTD: CPT | Performed by: INTERNAL MEDICINE

## 2022-08-25 PROCEDURE — 97110 THERAPEUTIC EXERCISES: CPT

## 2022-08-25 PROCEDURE — 63710000001 PREDNISONE PER 5 MG: Performed by: HOSPITALIST

## 2022-08-25 PROCEDURE — 25010000002 HEPARIN (PORCINE) 25000-0.45 UT/250ML-% SOLUTION: Performed by: INTERNAL MEDICINE

## 2022-08-25 PROCEDURE — 93321 DOPPLER ECHO F-UP/LMTD STD: CPT | Performed by: INTERNAL MEDICINE

## 2022-08-25 PROCEDURE — 93308 TTE F-UP OR LMTD: CPT

## 2022-08-25 PROCEDURE — 82962 GLUCOSE BLOOD TEST: CPT

## 2022-08-25 PROCEDURE — 85730 THROMBOPLASTIN TIME PARTIAL: CPT | Performed by: HOSPITALIST

## 2022-08-25 PROCEDURE — 71046 X-RAY EXAM CHEST 2 VIEWS: CPT

## 2022-08-25 PROCEDURE — 93321 DOPPLER ECHO F-UP/LMTD STD: CPT

## 2022-08-25 PROCEDURE — 93325 DOPPLER ECHO COLOR FLOW MAPG: CPT | Performed by: INTERNAL MEDICINE

## 2022-08-25 RX ORDER — HYDRALAZINE HYDROCHLORIDE 25 MG/1
25 TABLET, FILM COATED ORAL EVERY 8 HOURS SCHEDULED
Status: DISCONTINUED | OUTPATIENT
Start: 2022-08-25 | End: 2022-08-26

## 2022-08-25 RX ORDER — HYDRALAZINE HYDROCHLORIDE 25 MG/1
25 TABLET, FILM COATED ORAL EVERY 8 HOURS SCHEDULED
Status: DISCONTINUED | OUTPATIENT
Start: 2022-08-25 | End: 2022-08-25

## 2022-08-25 RX ADMIN — Medication 1 MG: at 09:14

## 2022-08-25 RX ADMIN — METOPROLOL TARTRATE 50 MG: 50 TABLET, FILM COATED ORAL at 20:23

## 2022-08-25 RX ADMIN — HYDRALAZINE HYDROCHLORIDE 25 MG: 25 TABLET, FILM COATED ORAL at 04:24

## 2022-08-25 RX ADMIN — ROPINIROLE HYDROCHLORIDE 0.75 MG: 0.5 TABLET, FILM COATED ORAL at 20:23

## 2022-08-25 RX ADMIN — INSULIN LISPRO 8 UNITS: 100 INJECTION, SOLUTION INTRAVENOUS; SUBCUTANEOUS at 12:10

## 2022-08-25 RX ADMIN — METOPROLOL TARTRATE 50 MG: 50 TABLET, FILM COATED ORAL at 04:23

## 2022-08-25 RX ADMIN — HEPARIN SODIUM 17 UNITS/KG/HR: 10000 INJECTION, SOLUTION INTRAVENOUS at 16:12

## 2022-08-25 RX ADMIN — HYDRALAZINE HYDROCHLORIDE 25 MG: 25 TABLET, FILM COATED ORAL at 10:52

## 2022-08-25 RX ADMIN — Medication 3 MG: at 23:05

## 2022-08-25 RX ADMIN — INSULIN LISPRO 3 UNITS: 100 INJECTION, SOLUTION INTRAVENOUS; SUBCUTANEOUS at 09:10

## 2022-08-25 RX ADMIN — INSULIN LISPRO 10 UNITS: 100 INJECTION, SOLUTION INTRAVENOUS; SUBCUTANEOUS at 12:33

## 2022-08-25 RX ADMIN — PREDNISONE 5 MG: 10 TABLET ORAL at 09:13

## 2022-08-25 RX ADMIN — PANTOPRAZOLE SODIUM 40 MG: 40 TABLET, DELAYED RELEASE ORAL at 09:12

## 2022-08-25 RX ADMIN — HYDRALAZINE HYDROCHLORIDE 25 MG: 25 TABLET, FILM COATED ORAL at 20:23

## 2022-08-25 RX ADMIN — TAMSULOSIN HYDROCHLORIDE 0.4 MG: 0.4 CAPSULE ORAL at 09:13

## 2022-08-25 RX ADMIN — SERTRALINE 150 MG: 100 TABLET, FILM COATED ORAL at 09:15

## 2022-08-25 RX ADMIN — SODIUM ZIRCONIUM CYCLOSILICATE 5 G: 5 POWDER, FOR SUSPENSION ORAL at 09:16

## 2022-08-25 RX ADMIN — GABAPENTIN 100 MG: 100 CAPSULE ORAL at 12:12

## 2022-08-25 RX ADMIN — METOPROLOL TARTRATE 50 MG: 50 TABLET, FILM COATED ORAL at 12:12

## 2022-08-25 RX ADMIN — ASPIRIN 81 MG: 81 TABLET, COATED ORAL at 09:14

## 2022-08-25 RX ADMIN — LEVOTHYROXINE SODIUM 200 MCG: 0.1 TABLET ORAL at 04:23

## 2022-08-26 ENCOUNTER — APPOINTMENT (OUTPATIENT)
Dept: CARDIOLOGY | Facility: HOSPITAL | Age: 57
End: 2022-08-26

## 2022-08-26 LAB
ALBUMIN SERPL-MCNC: 2.8 G/DL (ref 3.5–5.2)
ANION GAP SERPL CALCULATED.3IONS-SCNC: 12.3 MMOL/L (ref 5–15)
APTT PPP: 67.1 SECONDS (ref 22.7–35.4)
APTT PPP: 78.7 SECONDS (ref 22.7–35.4)
BASOPHILS # BLD AUTO: 0.04 10*3/MM3 (ref 0–0.2)
BASOPHILS NFR BLD AUTO: 0.5 % (ref 0–1.5)
BH CV ECHO MEAS - RVSP: 42 MMHG
BUN SERPL-MCNC: 25 MG/DL (ref 6–20)
BUN/CREAT SERPL: 7.9 (ref 7–25)
CALCIUM SPEC-SCNC: 7.6 MG/DL (ref 8.6–10.5)
CHLORIDE SERPL-SCNC: 99 MMOL/L (ref 98–107)
CO2 SERPL-SCNC: 20.7 MMOL/L (ref 22–29)
CREAT SERPL-MCNC: 3.18 MG/DL (ref 0.57–1)
DEPRECATED RDW RBC AUTO: 54.6 FL (ref 37–54)
EGFRCR SERPLBLD CKD-EPI 2021: 16.5 ML/MIN/1.73
EOSINOPHIL # BLD AUTO: 0.06 10*3/MM3 (ref 0–0.4)
EOSINOPHIL NFR BLD AUTO: 0.7 % (ref 0.3–6.2)
ERYTHROCYTE [DISTWIDTH] IN BLOOD BY AUTOMATED COUNT: 16.9 % (ref 12.3–15.4)
GLUCOSE BLDC GLUCOMTR-MCNC: 133 MG/DL (ref 70–130)
GLUCOSE BLDC GLUCOMTR-MCNC: 273 MG/DL (ref 70–130)
GLUCOSE BLDC GLUCOMTR-MCNC: 302 MG/DL (ref 70–130)
GLUCOSE BLDC GLUCOMTR-MCNC: 318 MG/DL (ref 70–130)
GLUCOSE BLDC GLUCOMTR-MCNC: 323 MG/DL (ref 70–130)
GLUCOSE BLDC GLUCOMTR-MCNC: 324 MG/DL (ref 70–130)
GLUCOSE SERPL-MCNC: 321 MG/DL (ref 65–99)
HCT VFR BLD AUTO: 29.9 % (ref 34–46.6)
HGB BLD-MCNC: 9.1 G/DL (ref 12–15.9)
IMM GRANULOCYTES # BLD AUTO: 0.11 10*3/MM3 (ref 0–0.05)
IMM GRANULOCYTES NFR BLD AUTO: 1.3 % (ref 0–0.5)
LV EF 2D ECHO EST: 55 %
LYMPHOCYTES # BLD AUTO: 1.44 10*3/MM3 (ref 0.7–3.1)
LYMPHOCYTES NFR BLD AUTO: 17.2 % (ref 19.6–45.3)
MAXIMAL PREDICTED HEART RATE: 164 BPM
MCH RBC QN AUTO: 27 PG (ref 26.6–33)
MCHC RBC AUTO-ENTMCNC: 30.4 G/DL (ref 31.5–35.7)
MCV RBC AUTO: 88.7 FL (ref 79–97)
MONOCYTES # BLD AUTO: 0.6 10*3/MM3 (ref 0.1–0.9)
MONOCYTES NFR BLD AUTO: 7.2 % (ref 5–12)
NEUTROPHILS NFR BLD AUTO: 6.14 10*3/MM3 (ref 1.7–7)
NEUTROPHILS NFR BLD AUTO: 73.1 % (ref 42.7–76)
NRBC BLD AUTO-RTO: 0 /100 WBC (ref 0–0.2)
PHOSPHATE SERPL-MCNC: 2.5 MG/DL (ref 2.5–4.5)
PLATELET # BLD AUTO: 216 10*3/MM3 (ref 140–450)
PMV BLD AUTO: 10.7 FL (ref 6–12)
POTASSIUM SERPL-SCNC: 5 MMOL/L (ref 3.5–5.2)
RBC # BLD AUTO: 3.37 10*6/MM3 (ref 3.77–5.28)
SODIUM SERPL-SCNC: 132 MMOL/L (ref 136–145)
STRESS TARGET HR: 139 BPM
WBC NRBC COR # BLD: 8.39 10*3/MM3 (ref 3.4–10.8)

## 2022-08-26 PROCEDURE — 93325 DOPPLER ECHO COLOR FLOW MAPG: CPT

## 2022-08-26 PROCEDURE — 25010000002 HEPARIN (PORCINE) 25000-0.45 UT/250ML-% SOLUTION: Performed by: INTERNAL MEDICINE

## 2022-08-26 PROCEDURE — B24BZZ4 ULTRASONOGRAPHY OF HEART WITH AORTA, TRANSESOPHAGEAL: ICD-10-PCS | Performed by: INTERNAL MEDICINE

## 2022-08-26 PROCEDURE — 25010000002 FENTANYL CITRATE (PF) 50 MCG/ML SOLUTION: Performed by: INTERNAL MEDICINE

## 2022-08-26 PROCEDURE — 93312 ECHO TRANSESOPHAGEAL: CPT | Performed by: INTERNAL MEDICINE

## 2022-08-26 PROCEDURE — 93320 DOPPLER ECHO COMPLETE: CPT | Performed by: INTERNAL MEDICINE

## 2022-08-26 PROCEDURE — 93325 DOPPLER ECHO COLOR FLOW MAPG: CPT | Performed by: INTERNAL MEDICINE

## 2022-08-26 PROCEDURE — 82962 GLUCOSE BLOOD TEST: CPT

## 2022-08-26 PROCEDURE — 93312 ECHO TRANSESOPHAGEAL: CPT

## 2022-08-26 PROCEDURE — 99232 SBSQ HOSP IP/OBS MODERATE 35: CPT | Performed by: INTERNAL MEDICINE

## 2022-08-26 PROCEDURE — 25010000002 MIDAZOLAM PER 1 MG: Performed by: INTERNAL MEDICINE

## 2022-08-26 PROCEDURE — 93320 DOPPLER ECHO COMPLETE: CPT

## 2022-08-26 PROCEDURE — 99152 MOD SED SAME PHYS/QHP 5/>YRS: CPT

## 2022-08-26 PROCEDURE — 85025 COMPLETE CBC W/AUTO DIFF WBC: CPT | Performed by: INTERNAL MEDICINE

## 2022-08-26 PROCEDURE — 85730 THROMBOPLASTIN TIME PARTIAL: CPT | Performed by: HOSPITALIST

## 2022-08-26 PROCEDURE — 63710000001 INSULIN LISPRO (HUMAN) PER 5 UNITS: Performed by: HOSPITALIST

## 2022-08-26 PROCEDURE — 99153 MOD SED SAME PHYS/QHP EA: CPT

## 2022-08-26 PROCEDURE — 80069 RENAL FUNCTION PANEL: CPT | Performed by: INTERNAL MEDICINE

## 2022-08-26 PROCEDURE — 25010000002 HEPARIN (PORCINE) PER 1000 UNITS: Performed by: INTERNAL MEDICINE

## 2022-08-26 RX ORDER — FENTANYL CITRATE 50 UG/ML
INJECTION, SOLUTION INTRAMUSCULAR; INTRAVENOUS
Status: COMPLETED | OUTPATIENT
Start: 2022-08-26 | End: 2022-08-26

## 2022-08-26 RX ORDER — HYDROXYZINE HYDROCHLORIDE 25 MG/1
25 TABLET, FILM COATED ORAL NIGHTLY PRN
Status: DISCONTINUED | OUTPATIENT
Start: 2022-08-26 | End: 2022-08-27 | Stop reason: HOSPADM

## 2022-08-26 RX ORDER — MIDAZOLAM HYDROCHLORIDE 1 MG/ML
INJECTION INTRAMUSCULAR; INTRAVENOUS
Status: COMPLETED | OUTPATIENT
Start: 2022-08-26 | End: 2022-08-26

## 2022-08-26 RX ORDER — LIDOCAINE HYDROCHLORIDE 20 MG/ML
SOLUTION OROPHARYNGEAL
Status: COMPLETED | OUTPATIENT
Start: 2022-08-26 | End: 2022-08-26

## 2022-08-26 RX ORDER — SODIUM CHLORIDE 9 MG/ML
INJECTION, SOLUTION INTRAVENOUS
Status: COMPLETED | OUTPATIENT
Start: 2022-08-26 | End: 2022-08-26

## 2022-08-26 RX ORDER — HYDRALAZINE HYDROCHLORIDE 50 MG/1
50 TABLET, FILM COATED ORAL EVERY 8 HOURS SCHEDULED
Status: DISCONTINUED | OUTPATIENT
Start: 2022-08-26 | End: 2022-08-27 | Stop reason: HOSPADM

## 2022-08-26 RX ADMIN — HEPARIN SODIUM 3800 UNITS: 1000 INJECTION INTRAVENOUS; SUBCUTANEOUS at 16:44

## 2022-08-26 RX ADMIN — MIDAZOLAM 1 MG: 1 INJECTION INTRAMUSCULAR; INTRAVENOUS at 08:34

## 2022-08-26 RX ADMIN — PANTOPRAZOLE SODIUM 40 MG: 40 TABLET, DELAYED RELEASE ORAL at 11:33

## 2022-08-26 RX ADMIN — HEPARIN SODIUM 15 UNITS/KG/HR: 10000 INJECTION, SOLUTION INTRAVENOUS at 19:38

## 2022-08-26 RX ADMIN — HYDROXYZINE HYDROCHLORIDE 25 MG: 25 TABLET ORAL at 00:26

## 2022-08-26 RX ADMIN — FENTANYL CITRATE 12.5 MCG: 50 INJECTION INTRAMUSCULAR; INTRAVENOUS at 08:36

## 2022-08-26 RX ADMIN — MIDAZOLAM 1 MG: 1 INJECTION INTRAMUSCULAR; INTRAVENOUS at 08:36

## 2022-08-26 RX ADMIN — HYDRALAZINE HYDROCHLORIDE 50 MG: 50 TABLET, FILM COATED ORAL at 21:28

## 2022-08-26 RX ADMIN — Medication 10 ML: at 10:20

## 2022-08-26 RX ADMIN — ROPINIROLE HYDROCHLORIDE 0.75 MG: 0.5 TABLET, FILM COATED ORAL at 21:29

## 2022-08-26 RX ADMIN — METOPROLOL TARTRATE 50 MG: 50 TABLET, FILM COATED ORAL at 21:28

## 2022-08-26 RX ADMIN — INSULIN LISPRO 10 UNITS: 100 INJECTION, SOLUTION INTRAVENOUS; SUBCUTANEOUS at 12:05

## 2022-08-26 RX ADMIN — ASPIRIN 81 MG: 81 TABLET, COATED ORAL at 11:34

## 2022-08-26 RX ADMIN — HYDRALAZINE HYDROCHLORIDE 25 MG: 25 TABLET, FILM COATED ORAL at 11:39

## 2022-08-26 RX ADMIN — SODIUM CHLORIDE 50 ML/HR: 9 INJECTION, SOLUTION INTRAVENOUS at 08:26

## 2022-08-26 RX ADMIN — MIDAZOLAM 1 MG: 1 INJECTION INTRAMUSCULAR; INTRAVENOUS at 08:38

## 2022-08-26 RX ADMIN — METOPROLOL TARTRATE 50 MG: 50 TABLET, FILM COATED ORAL at 11:33

## 2022-08-26 RX ADMIN — OXYCODONE 5 MG: 5 TABLET ORAL at 03:05

## 2022-08-26 RX ADMIN — LIDOCAINE HYDROCHLORIDE 10 ML: 20 SOLUTION ORAL; TOPICAL at 08:26

## 2022-08-26 RX ADMIN — FENTANYL CITRATE 12.5 MCG: 50 INJECTION INTRAMUSCULAR; INTRAVENOUS at 08:34

## 2022-08-26 RX ADMIN — TAMSULOSIN HYDROCHLORIDE 0.4 MG: 0.4 CAPSULE ORAL at 11:34

## 2022-08-27 ENCOUNTER — READMISSION MANAGEMENT (OUTPATIENT)
Dept: CALL CENTER | Facility: HOSPITAL | Age: 57
End: 2022-08-27

## 2022-08-27 VITALS
BODY MASS INDEX: 26.33 KG/M2 | HEIGHT: 62 IN | SYSTOLIC BLOOD PRESSURE: 159 MMHG | WEIGHT: 143.1 LBS | DIASTOLIC BLOOD PRESSURE: 84 MMHG | TEMPERATURE: 97.9 F | HEART RATE: 89 BPM | OXYGEN SATURATION: 90 % | RESPIRATION RATE: 18 BRPM

## 2022-08-27 LAB
APTT PPP: 82.1 SECONDS (ref 22.7–35.4)
BASOPHILS # BLD AUTO: 0.05 10*3/MM3 (ref 0–0.2)
BASOPHILS NFR BLD AUTO: 0.6 % (ref 0–1.5)
DEPRECATED RDW RBC AUTO: 53.9 FL (ref 37–54)
EOSINOPHIL # BLD AUTO: 0.12 10*3/MM3 (ref 0–0.4)
EOSINOPHIL NFR BLD AUTO: 1.3 % (ref 0.3–6.2)
ERYTHROCYTE [DISTWIDTH] IN BLOOD BY AUTOMATED COUNT: 16.6 % (ref 12.3–15.4)
GLUCOSE BLDC GLUCOMTR-MCNC: 244 MG/DL (ref 70–130)
GLUCOSE BLDC GLUCOMTR-MCNC: 259 MG/DL (ref 70–130)
HCT VFR BLD AUTO: 30.4 % (ref 34–46.6)
HGB BLD-MCNC: 9.1 G/DL (ref 12–15.9)
IMM GRANULOCYTES # BLD AUTO: 0.09 10*3/MM3 (ref 0–0.05)
IMM GRANULOCYTES NFR BLD AUTO: 1 % (ref 0–0.5)
LYMPHOCYTES # BLD AUTO: 1.4 10*3/MM3 (ref 0.7–3.1)
LYMPHOCYTES NFR BLD AUTO: 15.7 % (ref 19.6–45.3)
MCH RBC QN AUTO: 26.5 PG (ref 26.6–33)
MCHC RBC AUTO-ENTMCNC: 29.9 G/DL (ref 31.5–35.7)
MCV RBC AUTO: 88.4 FL (ref 79–97)
MONOCYTES # BLD AUTO: 0.85 10*3/MM3 (ref 0.1–0.9)
MONOCYTES NFR BLD AUTO: 9.6 % (ref 5–12)
NEUTROPHILS NFR BLD AUTO: 6.39 10*3/MM3 (ref 1.7–7)
NEUTROPHILS NFR BLD AUTO: 71.8 % (ref 42.7–76)
NRBC BLD AUTO-RTO: 0 /100 WBC (ref 0–0.2)
PLATELET # BLD AUTO: 161 10*3/MM3 (ref 140–450)
PMV BLD AUTO: 10.5 FL (ref 6–12)
RBC # BLD AUTO: 3.44 10*6/MM3 (ref 3.77–5.28)
WBC NRBC COR # BLD: 8.9 10*3/MM3 (ref 3.4–10.8)

## 2022-08-27 PROCEDURE — 63710000001 INSULIN LISPRO (HUMAN) PER 5 UNITS: Performed by: HOSPITALIST

## 2022-08-27 PROCEDURE — 99232 SBSQ HOSP IP/OBS MODERATE 35: CPT | Performed by: INTERNAL MEDICINE

## 2022-08-27 PROCEDURE — 85025 COMPLETE CBC W/AUTO DIFF WBC: CPT | Performed by: INTERNAL MEDICINE

## 2022-08-27 PROCEDURE — 85730 THROMBOPLASTIN TIME PARTIAL: CPT | Performed by: INTERNAL MEDICINE

## 2022-08-27 PROCEDURE — 82962 GLUCOSE BLOOD TEST: CPT

## 2022-08-27 PROCEDURE — 63710000001 PREDNISONE PER 5 MG: Performed by: HOSPITALIST

## 2022-08-27 PROCEDURE — 25010000002 ONDANSETRON PER 1 MG: Performed by: HOSPITALIST

## 2022-08-27 RX ADMIN — ASPIRIN 81 MG: 81 TABLET, COATED ORAL at 09:01

## 2022-08-27 RX ADMIN — INSULIN LISPRO 5 UNITS: 100 INJECTION, SOLUTION INTRAVENOUS; SUBCUTANEOUS at 12:42

## 2022-08-27 RX ADMIN — METOPROLOL TARTRATE 50 MG: 50 TABLET, FILM COATED ORAL at 12:31

## 2022-08-27 RX ADMIN — HYDRALAZINE HYDROCHLORIDE 25 MG: 25 TABLET, FILM COATED ORAL at 01:48

## 2022-08-27 RX ADMIN — HYDRALAZINE HYDROCHLORIDE 50 MG: 50 TABLET, FILM COATED ORAL at 07:52

## 2022-08-27 RX ADMIN — OXYCODONE 5 MG: 5 TABLET ORAL at 02:31

## 2022-08-27 RX ADMIN — INSULIN LISPRO 10 UNITS: 100 INJECTION, SOLUTION INTRAVENOUS; SUBCUTANEOUS at 12:42

## 2022-08-27 RX ADMIN — ONDANSETRON 4 MG: 2 INJECTION INTRAMUSCULAR; INTRAVENOUS at 08:39

## 2022-08-27 RX ADMIN — LEVOTHYROXINE SODIUM 200 MCG: 0.1 TABLET ORAL at 07:51

## 2022-08-27 RX ADMIN — HYDROXYZINE HYDROCHLORIDE 25 MG: 25 TABLET ORAL at 00:50

## 2022-08-27 RX ADMIN — HYDRALAZINE HYDROCHLORIDE 50 MG: 50 TABLET, FILM COATED ORAL at 14:37

## 2022-08-27 RX ADMIN — SODIUM ZIRCONIUM CYCLOSILICATE 5 G: 5 POWDER, FOR SUSPENSION ORAL at 09:01

## 2022-08-27 RX ADMIN — PREDNISONE 5 MG: 10 TABLET ORAL at 09:01

## 2022-08-27 RX ADMIN — INSULIN LISPRO 8 UNITS: 100 INJECTION, SOLUTION INTRAVENOUS; SUBCUTANEOUS at 09:00

## 2022-08-27 RX ADMIN — APIXABAN 5 MG: 5 TABLET, FILM COATED ORAL at 14:37

## 2022-08-27 RX ADMIN — Medication 1 MG: at 09:01

## 2022-08-27 RX ADMIN — Medication 3 MG: at 00:50

## 2022-08-27 RX ADMIN — METOPROLOL TARTRATE 50 MG: 50 TABLET, FILM COATED ORAL at 05:14

## 2022-08-27 RX ADMIN — PANTOPRAZOLE SODIUM 40 MG: 40 TABLET, DELAYED RELEASE ORAL at 09:01

## 2022-08-27 RX ADMIN — TAMSULOSIN HYDROCHLORIDE 0.4 MG: 0.4 CAPSULE ORAL at 09:01

## 2022-08-27 RX ADMIN — SERTRALINE 150 MG: 100 TABLET, FILM COATED ORAL at 09:01

## 2022-08-27 RX ADMIN — GABAPENTIN 100 MG: 100 CAPSULE ORAL at 12:31

## 2022-08-28 NOTE — OUTREACH NOTE
Prep Survey    Flowsheet Row Responses   Orthodoxy facility patient discharged from? [place]   Is LACE score < 7 ? No   Emergency Room discharge w/ pulse ox? No   Eligibility Readm Mgmt   Discharge diagnosis Pericardial effusion   Does the patient have one of the following disease processes/diagnoses(primary or secondary)? Other   Does the patient have Home health ordered? Yes   What is the [facility] health agency[facility]   Is there a DME ordered? Yes   What DME was ordered? [employer]    Prep survey completed? Yes          TARYN PIRES - Registered Nurse

## 2022-08-30 ENCOUNTER — READMISSION MANAGEMENT (OUTPATIENT)
Dept: CALL CENTER | Facility: HOSPITAL | Age: 57
End: 2022-08-30

## 2022-08-30 LAB
BACTERIA SPEC AEROBE CULT: NORMAL
BACTERIA SPEC AEROBE CULT: NORMAL

## 2022-08-30 NOTE — OUTREACH NOTE
Medical Week 1 Survey    Flowsheet Row Responses   [facility] patient discharged from? [place]   Does the patient have one of the following disease processes/diagnoses(primary or secondary)? Other   Week 1 attempt successful? No   Unsuccessful attempts Attempt 1          JEOVANY FALCON - Registered Nurse

## 2022-09-02 ENCOUNTER — READMISSION MANAGEMENT (OUTPATIENT)
Dept: CALL CENTER | Facility: HOSPITAL | Age: 57
End: 2022-09-02

## 2022-09-02 NOTE — OUTREACH NOTE
Medical Week 1 Survey    Flowsheet Row Responses   [facility] patient discharged from? [place]   Does the patient have one of the following disease processes/diagnoses(primary or secondary)? Other   Week 1 attempt successful? No   Unsuccessful attempts Attempt 2  [All number attempted-no answer]          SAMANTA PAINTING - Registered Nurse

## 2022-09-03 ENCOUNTER — HOSPITAL ENCOUNTER (INPATIENT)
Facility: HOSPITAL | Age: 57
LOS: 5 days | Discharge: HOME HEALTH | End: 2022-09-13
Attending: EMERGENCY MEDICINE | Admitting: INTERNAL MEDICINE

## 2022-09-03 ENCOUNTER — APPOINTMENT (OUTPATIENT)
Dept: GENERAL RADIOLOGY | Facility: HOSPITAL | Age: 57
End: 2022-09-03

## 2022-09-03 DIAGNOSIS — J81.0 ACUTE PULMONARY EDEMA: Primary | ICD-10-CM

## 2022-09-03 DIAGNOSIS — D64.9 CHRONIC ANEMIA: ICD-10-CM

## 2022-09-03 DIAGNOSIS — R73.9 HYPERGLYCEMIA: ICD-10-CM

## 2022-09-03 DIAGNOSIS — N18.6 ESRD ON DIALYSIS: ICD-10-CM

## 2022-09-03 DIAGNOSIS — Z99.2 ESRD ON DIALYSIS: ICD-10-CM

## 2022-09-03 DIAGNOSIS — E87.70 HYPERVOLEMIA, UNSPECIFIED HYPERVOLEMIA TYPE: ICD-10-CM

## 2022-09-03 DIAGNOSIS — R09.02 HYPOXIA: ICD-10-CM

## 2022-09-03 DIAGNOSIS — R54 AGE-RELATED PHYSICAL DEBILITY: ICD-10-CM

## 2022-09-03 LAB
ALBUMIN SERPL-MCNC: 3.3 G/DL (ref 3.5–5.2)
ANION GAP SERPL CALCULATED.3IONS-SCNC: 12.7 MMOL/L (ref 5–15)
BASOPHILS # BLD AUTO: 0.07 10*3/MM3 (ref 0–0.2)
BASOPHILS NFR BLD AUTO: 0.7 % (ref 0–1.5)
BUN SERPL-MCNC: 12 MG/DL (ref 6–20)
BUN/CREAT SERPL: 4.8 (ref 7–25)
CALCIUM SPEC-SCNC: 8.2 MG/DL (ref 8.6–10.5)
CHLORIDE SERPL-SCNC: 96 MMOL/L (ref 98–107)
CO2 SERPL-SCNC: 26.3 MMOL/L (ref 22–29)
CREAT SERPL-MCNC: 2.49 MG/DL (ref 0.57–1)
DEPRECATED RDW RBC AUTO: 52.4 FL (ref 37–54)
EGFRCR SERPLBLD CKD-EPI 2021: 22.2 ML/MIN/1.73
EOSINOPHIL # BLD AUTO: 0.03 10*3/MM3 (ref 0–0.4)
EOSINOPHIL NFR BLD AUTO: 0.3 % (ref 0.3–6.2)
ERYTHROCYTE [DISTWIDTH] IN BLOOD BY AUTOMATED COUNT: 16.5 % (ref 12.3–15.4)
GLUCOSE BLDC GLUCOMTR-MCNC: 348 MG/DL (ref 70–130)
GLUCOSE SERPL-MCNC: 324 MG/DL (ref 65–99)
HCT VFR BLD AUTO: 33.9 % (ref 34–46.6)
HGB BLD-MCNC: 10.3 G/DL (ref 12–15.9)
IMM GRANULOCYTES # BLD AUTO: 0.06 10*3/MM3 (ref 0–0.05)
IMM GRANULOCYTES NFR BLD AUTO: 0.6 % (ref 0–0.5)
LYMPHOCYTES # BLD AUTO: 1.02 10*3/MM3 (ref 0.7–3.1)
LYMPHOCYTES NFR BLD AUTO: 10 % (ref 19.6–45.3)
MCH RBC QN AUTO: 26.8 PG (ref 26.6–33)
MCHC RBC AUTO-ENTMCNC: 30.4 G/DL (ref 31.5–35.7)
MCV RBC AUTO: 88.3 FL (ref 79–97)
MONOCYTES # BLD AUTO: 0.58 10*3/MM3 (ref 0.1–0.9)
MONOCYTES NFR BLD AUTO: 5.7 % (ref 5–12)
NEUTROPHILS NFR BLD AUTO: 8.47 10*3/MM3 (ref 1.7–7)
NEUTROPHILS NFR BLD AUTO: 82.7 % (ref 42.7–76)
NRBC BLD AUTO-RTO: 0 /100 WBC (ref 0–0.2)
NT-PROBNP SERPL-MCNC: ABNORMAL PG/ML (ref 0–900)
PHOSPHATE SERPL-MCNC: 2.9 MG/DL (ref 2.5–4.5)
PLATELET # BLD AUTO: 199 10*3/MM3 (ref 140–450)
PMV BLD AUTO: 10.4 FL (ref 6–12)
POTASSIUM SERPL-SCNC: 4.7 MMOL/L (ref 3.5–5.2)
PROCALCITONIN SERPL-MCNC: 0.16 NG/ML (ref 0–0.25)
QT INTERVAL: 411 MS
RBC # BLD AUTO: 3.84 10*6/MM3 (ref 3.77–5.28)
SARS-COV-2 RNA RESP QL NAA+PROBE: NOT DETECTED
SODIUM SERPL-SCNC: 135 MMOL/L (ref 136–145)
WBC NRBC COR # BLD: 10.23 10*3/MM3 (ref 3.4–10.8)

## 2022-09-03 PROCEDURE — 25010000002 FUROSEMIDE PER 20 MG: Performed by: EMERGENCY MEDICINE

## 2022-09-03 PROCEDURE — 82962 GLUCOSE BLOOD TEST: CPT

## 2022-09-03 PROCEDURE — G0378 HOSPITAL OBSERVATION PER HR: HCPCS

## 2022-09-03 PROCEDURE — A9270 NON-COVERED ITEM OR SERVICE: HCPCS | Performed by: INTERNAL MEDICINE

## 2022-09-03 PROCEDURE — C9803 HOPD COVID-19 SPEC COLLECT: HCPCS

## 2022-09-03 PROCEDURE — 71045 X-RAY EXAM CHEST 1 VIEW: CPT

## 2022-09-03 PROCEDURE — 93005 ELECTROCARDIOGRAM TRACING: CPT | Performed by: EMERGENCY MEDICINE

## 2022-09-03 PROCEDURE — 83880 ASSAY OF NATRIURETIC PEPTIDE: CPT | Performed by: EMERGENCY MEDICINE

## 2022-09-03 PROCEDURE — 63710000001 INSULIN GLARGINE-YFGN 100 UNIT/ML SOLUTION: Performed by: INTERNAL MEDICINE

## 2022-09-03 PROCEDURE — 63710000001 AMLODIPINE 5 MG TABLET: Performed by: INTERNAL MEDICINE

## 2022-09-03 PROCEDURE — 85025 COMPLETE CBC W/AUTO DIFF WBC: CPT | Performed by: EMERGENCY MEDICINE

## 2022-09-03 PROCEDURE — 63710000001 HYDRALAZINE 50 MG TABLET: Performed by: INTERNAL MEDICINE

## 2022-09-03 PROCEDURE — 63710000001 ROPINIROLE 0.5 MG TABLET: Performed by: INTERNAL MEDICINE

## 2022-09-03 PROCEDURE — G0257 UNSCHED DIALYSIS ESRD PT HOS: HCPCS

## 2022-09-03 PROCEDURE — 99285 EMERGENCY DEPT VISIT HI MDM: CPT

## 2022-09-03 PROCEDURE — U0003 INFECTIOUS AGENT DETECTION BY NUCLEIC ACID (DNA OR RNA); SEVERE ACUTE RESPIRATORY SYNDROME CORONAVIRUS 2 (SARS-COV-2) (CORONAVIRUS DISEASE [COVID-19]), AMPLIFIED PROBE TECHNIQUE, MAKING USE OF HIGH THROUGHPUT TECHNOLOGIES AS DESCRIBED BY CMS-2020-01-R: HCPCS | Performed by: EMERGENCY MEDICINE

## 2022-09-03 PROCEDURE — 93010 ELECTROCARDIOGRAM REPORT: CPT | Performed by: INTERNAL MEDICINE

## 2022-09-03 PROCEDURE — 63710000001 CLONIDINE 0.1 MG TABLET: Performed by: INTERNAL MEDICINE

## 2022-09-03 PROCEDURE — 84145 PROCALCITONIN (PCT): CPT | Performed by: EMERGENCY MEDICINE

## 2022-09-03 PROCEDURE — 63710000001 APIXABAN 5 MG TABLET: Performed by: INTERNAL MEDICINE

## 2022-09-03 PROCEDURE — 80069 RENAL FUNCTION PANEL: CPT | Performed by: EMERGENCY MEDICINE

## 2022-09-03 PROCEDURE — 93005 ELECTROCARDIOGRAM TRACING: CPT

## 2022-09-03 RX ORDER — OXYCODONE HYDROCHLORIDE 5 MG/1
5 TABLET ORAL EVERY 4 HOURS PRN
Status: DISCONTINUED | OUTPATIENT
Start: 2022-09-03 | End: 2022-09-13 | Stop reason: HOSPADM

## 2022-09-03 RX ORDER — ONDANSETRON 4 MG/1
4 TABLET, FILM COATED ORAL EVERY 6 HOURS PRN
Status: DISCONTINUED | OUTPATIENT
Start: 2022-09-03 | End: 2022-09-13 | Stop reason: HOSPADM

## 2022-09-03 RX ORDER — LEVOTHYROXINE SODIUM 0.1 MG/1
200 TABLET ORAL
Status: DISCONTINUED | OUTPATIENT
Start: 2022-09-04 | End: 2022-09-08

## 2022-09-03 RX ORDER — LOSARTAN POTASSIUM 100 MG/1
100 TABLET ORAL
Status: DISCONTINUED | OUTPATIENT
Start: 2022-09-04 | End: 2022-09-13 | Stop reason: HOSPADM

## 2022-09-03 RX ORDER — PANTOPRAZOLE SODIUM 40 MG/1
40 TABLET, DELAYED RELEASE ORAL DAILY
Status: DISCONTINUED | OUTPATIENT
Start: 2022-09-04 | End: 2022-09-13 | Stop reason: HOSPADM

## 2022-09-03 RX ORDER — UREA 10 %
3 LOTION (ML) TOPICAL NIGHTLY PRN
Status: DISCONTINUED | OUTPATIENT
Start: 2022-09-03 | End: 2022-09-13 | Stop reason: HOSPADM

## 2022-09-03 RX ORDER — HEPARIN SODIUM 1000 [USP'U]/ML
4000 INJECTION, SOLUTION INTRAVENOUS; SUBCUTANEOUS AS NEEDED
Status: DISCONTINUED | OUTPATIENT
Start: 2022-09-03 | End: 2022-09-13 | Stop reason: HOSPADM

## 2022-09-03 RX ORDER — CLONIDINE HYDROCHLORIDE 0.1 MG/1
0.1 TABLET ORAL EVERY 8 HOURS SCHEDULED
Status: DISCONTINUED | OUTPATIENT
Start: 2022-09-03 | End: 2022-09-04

## 2022-09-03 RX ORDER — PREDNISONE 10 MG/1
5 TABLET ORAL SEE ADMIN INSTRUCTIONS
Status: DISCONTINUED | OUTPATIENT
Start: 2022-09-03 | End: 2022-09-07

## 2022-09-03 RX ORDER — NITROGLYCERIN 0.4 MG/1
0.4 TABLET SUBLINGUAL
Status: DISCONTINUED | OUTPATIENT
Start: 2022-09-03 | End: 2022-09-13 | Stop reason: HOSPADM

## 2022-09-03 RX ORDER — FOLIC ACID 1 MG/1
1 TABLET ORAL DAILY
Status: DISCONTINUED | OUTPATIENT
Start: 2022-09-04 | End: 2022-09-13 | Stop reason: HOSPADM

## 2022-09-03 RX ORDER — ONDANSETRON 2 MG/ML
4 INJECTION INTRAMUSCULAR; INTRAVENOUS EVERY 6 HOURS PRN
Status: DISCONTINUED | OUTPATIENT
Start: 2022-09-03 | End: 2022-09-13 | Stop reason: HOSPADM

## 2022-09-03 RX ORDER — AMLODIPINE BESYLATE 5 MG/1
5 TABLET ORAL 2 TIMES DAILY
Status: DISCONTINUED | OUTPATIENT
Start: 2022-09-03 | End: 2022-09-04

## 2022-09-03 RX ORDER — HYDRALAZINE HYDROCHLORIDE 50 MG/1
100 TABLET, FILM COATED ORAL EVERY 8 HOURS SCHEDULED
Status: DISCONTINUED | OUTPATIENT
Start: 2022-09-03 | End: 2022-09-05

## 2022-09-03 RX ORDER — HYDRALAZINE HYDROCHLORIDE 10 MG/1
10 TABLET, FILM COATED ORAL EVERY 8 HOURS PRN
Status: DISCONTINUED | OUTPATIENT
Start: 2022-09-03 | End: 2022-09-13 | Stop reason: HOSPADM

## 2022-09-03 RX ORDER — GABAPENTIN 100 MG/1
100 CAPSULE ORAL
Status: DISCONTINUED | OUTPATIENT
Start: 2022-09-04 | End: 2022-09-13 | Stop reason: HOSPADM

## 2022-09-03 RX ORDER — ASPIRIN 81 MG/1
81 TABLET ORAL DAILY
Status: DISCONTINUED | OUTPATIENT
Start: 2022-09-04 | End: 2022-09-13 | Stop reason: HOSPADM

## 2022-09-03 RX ORDER — FUROSEMIDE 10 MG/ML
80 INJECTION INTRAMUSCULAR; INTRAVENOUS ONCE
Status: COMPLETED | OUTPATIENT
Start: 2022-09-03 | End: 2022-09-03

## 2022-09-03 RX ORDER — INSULIN LISPRO 100 [IU]/ML
0-9 INJECTION, SOLUTION INTRAVENOUS; SUBCUTANEOUS
Status: DISCONTINUED | OUTPATIENT
Start: 2022-09-04 | End: 2022-09-09

## 2022-09-03 RX ORDER — NICOTINE POLACRILEX 4 MG
15 LOZENGE BUCCAL
Status: DISCONTINUED | OUTPATIENT
Start: 2022-09-03 | End: 2022-09-13 | Stop reason: HOSPADM

## 2022-09-03 RX ORDER — SPIRONOLACTONE 100 MG/1
100 TABLET, FILM COATED ORAL DAILY
Status: DISCONTINUED | OUTPATIENT
Start: 2022-09-04 | End: 2022-09-04

## 2022-09-03 RX ORDER — POLYETHYLENE GLYCOL 3350 17 G/17G
17 POWDER, FOR SOLUTION ORAL DAILY PRN
Status: DISCONTINUED | OUTPATIENT
Start: 2022-09-03 | End: 2022-09-13 | Stop reason: HOSPADM

## 2022-09-03 RX ORDER — DEXTROSE MONOHYDRATE 25 G/50ML
25 INJECTION, SOLUTION INTRAVENOUS
Status: DISCONTINUED | OUTPATIENT
Start: 2022-09-03 | End: 2022-09-13 | Stop reason: HOSPADM

## 2022-09-03 RX ORDER — ROPINIROLE 0.5 MG/1
0.75 TABLET, FILM COATED ORAL NIGHTLY
Status: DISCONTINUED | OUTPATIENT
Start: 2022-09-03 | End: 2022-09-10

## 2022-09-03 RX ORDER — ACETAMINOPHEN 325 MG/1
650 TABLET ORAL EVERY 4 HOURS PRN
Status: DISCONTINUED | OUTPATIENT
Start: 2022-09-03 | End: 2022-09-13 | Stop reason: HOSPADM

## 2022-09-03 RX ADMIN — CLONIDINE HYDROCHLORIDE 0.1 MG: 0.1 TABLET ORAL at 22:50

## 2022-09-03 RX ADMIN — FUROSEMIDE 80 MG: 10 INJECTION, SOLUTION INTRAMUSCULAR; INTRAVENOUS at 15:28

## 2022-09-03 RX ADMIN — APIXABAN 5 MG: 5 TABLET, FILM COATED ORAL at 22:50

## 2022-09-03 RX ADMIN — ROPINIROLE HYDROCHLORIDE 0.75 MG: 0.5 TABLET, FILM COATED ORAL at 22:50

## 2022-09-03 RX ADMIN — INSULIN GLARGINE-YFGN 10 UNITS: 100 INJECTION, SOLUTION SUBCUTANEOUS at 22:51

## 2022-09-03 RX ADMIN — AMLODIPINE BESYLATE 5 MG: 5 TABLET ORAL at 22:50

## 2022-09-03 RX ADMIN — HYDRALAZINE HYDROCHLORIDE 100 MG: 50 TABLET, FILM COATED ORAL at 22:50

## 2022-09-03 NOTE — ED NOTES
Nursing report ED to floor  Zaina Martinez  56 y.o.  female    HPI :   Chief Complaint   Patient presents with   • Shortness of Breath       Admitting doctor:   Starla Boston MD    Admitting diagnosis:   The primary encounter diagnosis was Acute pulmonary edema (HCC). Diagnoses of Hypoxia, Hypervolemia, unspecified hypervolemia type, ESRD on dialysis (HCC), Chronic anemia, and Hyperglycemia were also pertinent to this visit.    Code status:   Current Code Status     Date Active Code Status Order ID Comments User Context       Prior    Advance Care Planning Activity          Allergies:   Contrast dye, Ibuprofen, and Prochlorperazine    Intake and Output  No intake or output data in the 24 hours ending 09/03/22 1609    Weight:       09/03/22  1355   Weight: 64.9 kg (143 lb 1.3 oz)       Most recent vitals:   Vitals:    09/03/22 1446 09/03/22 1516 09/03/22 1546 09/03/22 1547   BP: 136/75 150/83 150/89    Pulse: 76 81 81    Resp:  16 16    Temp:       TempSrc:       SpO2: 97% 98% 96% 96%   Weight:       Height:           Active LDAs/IV Access:   Lines, Drains & Airways     Active LDAs     Name Placement date Placement time Site Days    Peripheral IV 09/03/22 1357 Left Antecubital 09/03/22  1357  Antecubital  less than 1    Hemodialysis Cath Double 05/03/22  0909  Internal Jugular  123                Labs (abnormal labs have a star):   Labs Reviewed   RENAL FUNCTION PANEL - Abnormal; Notable for the following components:       Result Value    Glucose 324 (*)     Creatinine 2.49 (*)     Sodium 135 (*)     Chloride 96 (*)     Calcium 8.2 (*)     Albumin 3.30 (*)     BUN/Creatinine Ratio 4.8 (*)     eGFR 22.2 (*)     All other components within normal limits    Narrative:     GFR Normal >60  Chronic Kidney Disease <60  Kidney Failure <15     BNP (IN-HOUSE) - Abnormal; Notable for the following components:    proBNP 63,037.0 (*)     All other components within normal limits    Narrative:     Among patients with dyspnea,  NT-proBNP is highly sensitive for the detection of acute congestive heart failure. In addition NT-proBNP of <300 pg/ml effectively rules out acute congestive heart failure with 99% negative predictive value.    Results may be falsely decreased if patient taking Biotin.     CBC WITH AUTO DIFFERENTIAL - Abnormal; Notable for the following components:    Hemoglobin 10.3 (*)     Hematocrit 33.9 (*)     MCHC 30.4 (*)     RDW 16.5 (*)     Neutrophil % 82.7 (*)     Lymphocyte % 10.0 (*)     Immature Grans % 0.6 (*)     Neutrophils, Absolute 8.47 (*)     Immature Grans, Absolute 0.06 (*)     All other components within normal limits   COVID-19,[facility] IN-HOUSE CEPHEID/MARTY, NP SWAB IN TRANSPORT MEDIA 8-12 HR TAT   PROCALCITONIN   CBC AND DIFFERENTIAL    Narrative:     The following orders were created for panel order CBC & Differential.  Procedure                               Abnormality         Status                     ---------                               -----------         ------                     CBC Auto Differential[[id number]]        Abnormal            Final result                 Please view results for these tests on the individual orders.       EKG:   ECG 12 Lead   Preliminary Result   HEART RATE= 88  bpm   RR Interval= 682  ms   AZ Interval=   ms   P Horizontal Axis=   deg   P Front Axis=   deg   QRSD Interval= 138  ms   QT Interval= 411  ms   QRS Axis= -50  deg   T Wave Axis= 242  deg   - ABNORMAL ECG -   Atrial flutter   Nonspecific IVCD with LAD   Nonspecific T abnormalities, diffuse leads   Electronically Signed By:    Date and Time of Study: 2022-09-03 14:07:23          Meds given in ED:   Medications   furosemide (LASIX) injection 80 mg (80 mg Intravenous Given 9/3/22 1528)       Imaging results:  XR Chest 1 View    Result Date: 9/3/2022  Findings of mild CHF with cardiomegaly and vascular engorgement and mild perihilar hydrostatic edema. Left greater than right pleural effusions with unchanged left  basilar opacity. No pneumothorax.  This report was finalized on 9/3/2022 2:01 PM by Dr. Aguila Pulido M.D.        Ambulatory status:   With assist     Social issues:   Social History     Socioeconomic History   • Marital status:      Spouse name: Marv   Tobacco Use   • Smoking status: Never Smoker   • Smokeless tobacco: Never Used   Vaping Use   • Vaping Use: Never used   Substance and Sexual Activity   • Alcohol use: Never   • Drug use: Never   • Sexual activity: Defer       NIH Stroke Scale:        Nursing report ED to floor:   14-Sep-1976

## 2022-09-03 NOTE — CONSULTS
[facility] Consult Note      Patient Name: Zaina Martinez  : 1965  MRN: [id number]  Primary Care Physician:  System, Provider Not In  Referring Physician: No ref. provider found  Date of admission: 9/3/2022    Subjective     Reason for Consult:  ESRD    HPI:   Zaina Martinez is a 56 y.o. female with ESRD on HD ([facility]; F; right chest catheter; Dr. Vazquez Villalpando) admitted today for further evaluation of shortness of breath.  Last HD was yesterday, though only 1 kg removed.  CXR today with mild CHF, cardiomegaly, and vascular engorgement.  Full PMH outlined below.  Patient just left the hospital 1 week ago today for CHF exacerbation with exchange of TDC due to mobile thrombus on the previous one; aggressive fluid removal on HD; and left thoracentesis (1.3 L).    · She reports no improvement in shortness of breath following yesterday's treatment  · Denies orthopnea; no leg swelling, but has had increasing abdominal girth  · No fever or chills; no cough  · Appetite is poor; eats 1 small meal per day according to  at bedside  · Still makes urine; no urinary complaints      Review of Systems:   14 point review of systems is otherwise negative except for mentioned above on HPI    Personal History     Past Medical History:   Diagnosis Date   • Acute CVA (cerebrovascular accident) (HCC) 2022   • Acute on chronic diastolic CHF (congestive heart failure) (HCC)    • CAD (coronary artery disease) 2021   • Diabetes (HCC)    • Disease of thyroid gland    • GERD (gastroesophageal reflux disease)    • Hyperlipidemia 2018   • Hypertension    • Rheumatoid arthritis (HCC)        Past Surgical History:   Procedure Laterality Date   • CHOLECYSTECTOMY WITH INTRAOPERATIVE CHOLANGIOGRAM N/A 1/10/2022    Procedure: Laparoscopic cholecystectomy with intraoperative cholangiogram;  Surgeon: Ramana Raygoza MD;  Location: [facility];  Service: General;  Laterality: N/A;   •  EYE SURGERY     • HYSTERECTOMY     • INSERTION HEMODIALYSIS CATHETER N/A 12/6/2021    Procedure: HEMODIALYSIS CATHETER INSERTION;  Surgeon: Keli Salazar MD;  Location: [facility] 18/19;  Service: Vascular;  Laterality: N/A;   • INSERTION HEMODIALYSIS CATHETER N/A 5/3/2022    Procedure: TUNNELED CATHETER PLACEMENT;  Surgeon: Keli Salazar MD;  Location: [facility] OR;  Service: Vascular;  Laterality: N/A;   • MUSCLE BIOPSY Left 6/15/2022    Procedure: Left quadriceps muscle biopsy;  Surgeon: Marques Ma MD;  Location: [facility] OR;  Service: Neurosurgery;  Laterality: Left;       Family History: family history is not on file.    Social History:  reports that she has never smoked. She has never used smokeless tobacco. She reports that she does not drink alcohol and does not use drugs.    Home Medications:  Prior to Admission medications    Medication Sig Start Date End Date Taking? Authorizing Provider   amLODIPine (NORVASC) 5 MG tablet Take 1 tablet by mouth 2 (Two) Times a Day. 8/4/22  Yes Eddie Givens MD   apixaban (ELIQUIS) 5 MG tablet tablet Take 1 tablet by mouth Every 12 (Twelve) Hours. Indications: Atrial Fibrillation 8/27/22  Yes Lul Reid MD   aspirin 81 MG EC tablet Take 1 tablet by mouth Daily. 7/22/22  Yes Adonis Florentino MD   cloNIDine (CATAPRES) 0.1 MG tablet Take 1 tablet by mouth Every 8 (Eight) Hours. 8/4/22  Yes Eddie Givens MD   folic acid (FOLVITE) 1 MG tablet Take 1 mg by mouth Daily.   Yes Doyle Murdock MD   gabapentin (NEURONTIN) 100 MG capsule Take one tab po in afternoon daily  Patient taking differently: Take 100 mg by mouth Daily With Lunch. Take one tab po in afternoon daily 7/25/22  Yes Adonis Florentino MD   hydrALAZINE (APRESOLINE) 10 MG tablet Take 1 tablet by mouth Every 8 (Eight) Hours As Needed (SBP > 160. Do not give on hemodialysis days.). 7/25/22  Yes Adonis Florentino MD   hydrALAZINE (APRESOLINE) 100 MG tablet  Take 1 tablet by mouth Every 8 (Eight) Hours. 7/1/22  Yes Adonis Florentino MD   insulin glargine (LANTUS, SEMGLEE) 100 UNIT/ML injection Inject 10 Units under the skin into the appropriate area as directed Every 12 (Twelve) Hours. 7/22/22  Yes Adonis Florentino MD   insulin lispro (ADMELOG) 100 UNIT/ML injection Inject 0-14 Units under the skin into the appropriate area as directed 3 (Three) Times a Day Before Meals.  Patient taking differently: Inject 0-14 Units under the skin into the appropriate area as directed 3 (Three) Times a Day Before Meals. Per sliding Scale 7/22/22  Yes Adonis Florentino MD   levothyroxine (SYNTHROID, LEVOTHROID) 200 MCG tablet Take 1 tablet by mouth Every Morning. 7/25/22  Yes Adonis Florentino MD   losartan (COZAAR) 100 MG tablet Take 1 tablet by mouth Daily. 7/1/22  Yes Adonis Florentino MD   miconazole (MICOTIN) 2 % cream Apply 1 application topically to the appropriate area as directed Every 12 (Twelve) Hours.  Patient taking differently: Apply 1 application topically to the appropriate area as directed Every 12 (Twelve) Hours. Lower Back Rash 7/22/22  Yes Adonis Florentino MD   oxyCODONE (ROXICODONE) 5 MG immediate release tablet Take 1 tablet by mouth Every 4 (Four) Hours As Needed for Severe Pain  for up to 60 doses. Taper off over time  Patient taking differently: Take 5 mg by mouth Every 4 (Four) Hours As Needed for Severe Pain. 7/22/22  Yes Adonis Florentino MD   pantoprazole (PROTONIX) 40 MG EC tablet Take 1 tablet by mouth Daily. 7/1/22  Yes Adonis Florentino MD   polyethylene glycol (MIRALAX) 17 g packet Take 17 g by mouth Daily As Needed (constipation). 7/22/22  Yes Adonis Florentino MD   predniSONE (DELTASONE) 5 MG tablet Two tabs po with breakfast July 26-July 28, 1.5 tabs with breakfast July 29-August 4, then 1 tab daily starting Friday August 5, 2022 and continue on that dose  Patient taking differently: Take 5 mg by mouth  See Admin Instructions. Two tabs po with breakfast July 26-July 28, 1.5 tabs with breakfast July 29-August 4, then 1 tab daily starting Friday August 5, 2022 and continue on that dose 7/25/22  Yes Adonis Florentino MD   rOPINIRole (REQUIP) 0.25 MG tablet Take 0.75 mg by mouth every night at bedtime. 9/10/21  Yes oDyle Murdock MD   sertraline (ZOLOFT) 50 MG tablet Take 3 tablets by mouth Daily. 7/2/22  Yes Adonis Florentino MD   sodium zirconium cyclosilicate (LOKELMA) 5 g pack Take 5 g by mouth Daily. 7/25/22  Yes Adonis Florentino MD   spironolactone (ALDACTONE) 100 MG tablet Take 1 tablet by mouth Daily. 7/25/22  Yes Adonis Florentino MD   tamsulosin (FLOMAX) 0.4 MG capsule 24 hr capsule Take 1 capsule by mouth Daily.   Yes Doyle Murdock MD   epoetin brooke-epbx (RETACRIT) 37293 UNIT/ML injection Inject 1 mL under the skin into the appropriate area as directed 3 (Three) Times a Week. Indications: ESRD on Dialysis  Patient taking differently: Inject 10,000 Units under the skin into the appropriate area as directed 3 (Three) Times a Week. Monday, Wednesday, and Friday  Indications: ESRD on Dialysis 7/1/22   Adonis Florentino MD       Allergies:  Allergies   Allergen Reactions   • Contrast Dye Confusion   • Ibuprofen Other (See Comments)     Kidney disease   • Prochlorperazine Other (See Comments)     Dystonic reaction            Objective     Vitals:   Temp:  [97.6 °F (36.4 °C)-97.9 °F (36.6 °C)] 97.9 °F (36.6 °C)  Heart Rate:  [] 80  Resp:  [16-18] 18  BP: (129-166)/() 165/111  Flow (L/min):  [4] 4  No intake or output data in the 24 hours ending 09/03/22 3505    Physical Exam:   Constitutional: Awake, alert, chronically ill, slight facial edema  HEENT: Sclera anicteric, no conjunctival injection  Neck: Supple, no carotid bruit, trachea at midline, no JVD  Respiratory: Decreased BS in bases; not labored on 4 L/min  Cardiovascular: Irregularly irregular, not  tachycardic, 2/6M, no rub  Gastrointestinal: BS +, soft, nontender, distended  Vascular: Right chest TDC  : No palpable bladder  Musculoskeletal:  Trace edema; + clubbing  Psychiatric: Appropriate affect, cooperative, fully oriented  Neurologic:  moving all extremities, normal speech and mental status  Skin: Warm and dry       Scheduled Meds:       IV Meds:   No current facility-administered medications for this encounter.      Results Reviewed:   I have personally reviewed the results from the time of this admission to 9/3/2022 17:45 EDT     Lab Results   Component Value Date    GLUCOSE 324 (H) 09/03/2022    CALCIUM 8.2 (L) 09/03/2022     (L) 09/03/2022    K 4.7 09/03/2022    CO2 26.3 09/03/2022    CL 96 (L) 09/03/2022    BUN 12 09/03/2022    CREATININE 2.49 (H) 09/03/2022    EGFRIFAFRI  01/13/2022      Comment:      <15 Indicative of kidney failure.    EGFRIFNONA 14 (L) 02/28/2022    BCR 4.8 (L) 09/03/2022    ANIONGAP 12.7 09/03/2022      Lab Results   Component Value Date    MG 1.9 08/22/2022    PHOS 2.9 09/03/2022    ALBUMIN 3.30 (L) 09/03/2022           Assessment / Plan     ASSESSMENT:  1.  ESRD: Volume excess by exam and imaging; stable potassium despite hyperglycemia; dilutional hyponatremia; normal anion gap. Last HD was yesterday  2.  Bilateral pleural effusions; has chronic small pericardial effusion, with echo late last month negative for tamponade  3.  Atrial fibrillation/flutter  4.  Hypertension of ESRD, exacerbated by volume overload  5.  History of noncompliance with fluid restriction and medications in general  6.  Diabetes mellitus with renal complications; uncontrolled  7.  Anemia of ESRD: Hemoglobin at goal    PLAN:  1.  iUF this evening:  will try to remove 3 L  2.  Will discuss with HD staff at her home unit regarding more aggressive challenge to dry weight, merrill since she is not eating and likely is losing body wt  3.  Surveillance labs    Thank you for involving us in the care of Zaina  RAJ Martinez.  Please feel free to call with any questions.    Alejo Lange MD  09/03/22  17:45 EDT    [facility]  [phone]      Much of this encounter note is an electronic transcription/translation of spoken language to printed text. The electronic translation of spoken language may permit erroneous, or at times, nonsensical words or phrases to be inadvertently transcribed; Although I have reviewed the note for such errors, some may still exist.

## 2022-09-03 NOTE — ED TRIAGE NOTES
Worsening soa x 8 days after admission here for clotted dialysis catheter.  Pt wearing mask on arrival

## 2022-09-03 NOTE — ED PROVIDER NOTES
EMERGENCY DEPARTMENT ENCOUNTER  I wore full protective equipment throughout this patient encounter including a N95 mask, eye shield, gown and gloves. Hand hygiene was performed before donning protective equipment and after removal when leaving the room.    Room Number:  15/15  Date of encounter:  9/3/2022  PCP: System, Provider Not In    HPI:  Context: Zaina Martinez is a 56 y.o. female who presents to the ED c/o chief complaint of shortness of breath.  Patient complains of shortness of breath for the last week.  Patient reports dyspnea at rest, worse with exertion, endorses orthopnea, no paroxysmal nocturnal dyspnea.  Patient denies any swelling her legs, does endorse swelling in her abdomen.  Patient denies any wheezing.  Patient has had cough, nonproductive in nature.  No runny nose congestion or sore throat, no loss of sense of smell or taste, no vomiting or diarrhea, no fever shakes chills or night sweats.  Patient denies any chest pain.  Patient reports that she is on dialysis, last dialysis yesterday, Monday Wednesday Friday dialysis, no missed dialysis.  Patient reports that she received full run of dialysis yesterday, did not have any improvement in breathing with dialysis.  Patient denies a history of heart failure but does have listed history of heart failure, does still make urine, is not on a diuretic.    MEDICAL HISTORY REVIEW  Reviewed in EPIC    PAST MEDICAL HISTORY  Active Ambulatory Problems     Diagnosis Date Noted   • Renal insufficiency 03/16/2018   • Hypertensive disorder 08/25/2021   • Hypothyroidism 11/29/2021   • Type 2 diabetes mellitus with kidney complication, with long-term current use of insulin ([facility]) 05/01/2018   • Rheumatoid arthritis ([facility]) 03/30/2021   • Angioedema 11/30/2021   • Esophageal dysmotility 10/15/2021   • Anemia 03/16/2018   • Medically noncompliant 12/01/2021   • Myocardial infarction due to demand ischemia ([facility]) 12/01/2021   • Enteritis 12/03/2021   • PRES (posterior  reversible encephalopathy syndrome) 12/05/2021   • Urine retention 12/08/2021   • Klebsiella infection 12/08/2021   • Superficial thrombophlebitis 12/10/2021   • Generalized weakness 12/19/2021   • ESRD (end stage renal disease) ([facility]) 12/20/2021   • CAD (coronary artery disease) 12/20/2021   • Abnormal urinalysis 12/20/2021   • Chronic diastolic CHF (congestive heart failure) ([facility]) 12/25/2021   • Pyelonephritis 01/09/2022   • Calculus of gallbladder with acute on chronic cholecystitis without obstruction 01/09/2022   • Pleural effusion on right 01/09/2022   • Anemia due to chronic kidney disease, on chronic dialysis ([facility]) 01/11/2022   • Abnormal findings on diagnostic imaging of other specified body structures 04/04/2017   • Acute upper respiratory infection 10/31/2018   • Agitation 03/30/2021   • Alkaline phosphatase raised 03/16/2018   • Casts present in urine 03/29/2021   • Cellulitis of toe 11/15/2019   • Hip pain 06/19/2020   • Community acquired pneumonia 02/13/2017   • Depressive disorder 10/31/2018   • Diarrhea of presumed infectious origin 03/30/2021   • Difficult or painful urination 08/11/2020   • Disease due to severe acute respiratory syndrome coronavirus 2 (SARS-CoV-2) 03/30/2021   • Dyspnea 11/15/2019   • Encounter for follow-up examination after completed treatment for conditions other than malignant neoplasm 02/13/2017   • H/O: hypothyroidism 08/25/2021   • Hyperlipidemia 11/30/2018   • Hypomagnesemia 03/30/2021   • Intractable vomiting with nausea 03/29/2021   • Leukocytosis 03/16/2018   • 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   • Need for influenza vaccination 11/30/2018   • Restless legs 11/15/2019   • Noncompliance with treatment 10/31/2018   • Shoulder pain 06/19/2020   • Urinary tract infectious disease 07/17/2018   • Metabolic encephalopathy 02/24/2022   • Abnormal findings on diagnostic imaging of abdomen 02/24/2022   • Status post cholecystectomy 02/24/2022   • Hyponatremia 02/24/2022   •  Leukocytosis 02/24/2022   • Acute metabolic encephalopathy 04/27/2022   • Encephalopathy, toxic 04/28/2022   • Acute CVA (cerebrovascular accident) ([facility]) 05/01/2022   • Intracranial hemorrhage ([facility]) 05/01/2022   • Stroke ([facility]) 05/13/2022   • Abnormal urinalysis 06/24/2022   • Diabetic muscle infarction ([facility]) 07/01/2022   • Other insomnia 07/01/2022   • Altered mental status 07/27/2022   • History of stroke- R MCA s/p TPA with subsequent ICH with debility 07/27/2022   • Heart murmur 07/27/2022   • Bradycardia 07/27/2022   • Left lower lobe pneumonia 07/27/2022   • Metabolic encephalopathy 07/29/2022   • Pericardial effusion 08/20/2022   • Pleural effusion 08/20/2022     Resolved Ambulatory Problems     Diagnosis Date Noted   • Hypertensive urgency 11/30/2021     Past Medical History:   Diagnosis Date   • Acute on chronic diastolic CHF (congestive heart failure) ([facility])    • Diabetes ([facility])    • Disease of thyroid gland    • GERD (gastroesophageal reflux disease)    • Hypertension        PAST SURGICAL HISTORY  Past Surgical History:   Procedure Laterality Date   • CHOLECYSTECTOMY WITH INTRAOPERATIVE CHOLANGIOGRAM N/A 1/10/2022    Procedure: Laparoscopic cholecystectomy with intraoperative cholangiogram;  Surgeon: Ramana Raygoza MD;  Location: [facility];  Service: General;  Laterality: N/A;   • EYE SURGERY     • HYSTERECTOMY     • INSERTION HEMODIALYSIS CATHETER N/A 12/6/2021    Procedure: HEMODIALYSIS CATHETER INSERTION;  Surgeon: Keli Salazar MD;  Location: [facility] 18/19;  Service: Vascular;  Laterality: N/A;   • INSERTION HEMODIALYSIS CATHETER N/A 5/3/2022    Procedure: TUNNELED CATHETER PLACEMENT;  Surgeon: Keli Salazar MD;  Location: [facility] OR;  Service: Vascular;  Laterality: N/A;   • MUSCLE BIOPSY Left 6/15/2022    Procedure: Left quadriceps muscle biopsy;  Surgeon: Marques Ma MD;  Location: [facility];  Service: Neurosurgery;  Laterality: Left;       FAMILY  HISTORY  Family History   Problem Relation Age of Onset   • Malig Hyperthermia Neg Hx        SOCIAL HISTORY  Social History     Socioeconomic History   • Marital status:      Spouse name: Marv   Tobacco Use   • Smoking status: Never Smoker   • Smokeless tobacco: Never Used   Vaping Use   • Vaping Use: Never used   Substance and Sexual Activity   • Alcohol use: Never   • Drug use: Never   • Sexual activity: Defer       ALLERGIES  Contrast dye, Ibuprofen, and Prochlorperazine    The patient's allergies have been reviewed    REVIEW OF SYSTEMS  All systems reviewed and negative except for those discussed in HPI.     PHYSICAL EXAM  I have reviewed the triage vital signs and nursing notes.  ED Triage Vitals [09/03/22 1258]   Temp Heart Rate Resp BP SpO2   97.6 °F (36.4 °C) 66 16 129/94 94 %      Temp src Heart Rate Source Patient Position BP Location FiO2 (%)   Tympanic -- -- -- --       General: No acute distress.  HENT: NCAT, PERRL, Nares patent.  Eyes: no scleral icterus.  Neck: trachea midline, no ROM limitations.  CV: Irregular regular  Chest wall: Dialysis catheter in right upper chest, no signs of infection  Respiratory: normal effort, requiring 2 L to maintain oxygen saturation of 94%, crackles bilateral lower lungs.  Abdomen: soft, nondistended, NTTP, no rebound tenderness, no guarding or rigidity.  Musculoskeletal: no deformity.  Neuro: alert, moves all extremities, follows commands.  Skin: warm, dry.    LAB RESULTS  Recent Results (from the past 24 hour(s))   Renal Function Panel    Collection Time: 09/03/22  1:57 PM    Specimen: Blood   Result Value Ref Range    Glucose 324 (H) 65 - 99 mg/dL    BUN 12 6 - 20 mg/dL    Creatinine 2.49 (H) 0.57 - 1.00 mg/dL    Sodium 135 (L) 136 - 145 mmol/L    Potassium 4.7 3.5 - 5.2 mmol/L    Chloride 96 (L) 98 - 107 mmol/L    CO2 26.3 22.0 - 29.0 mmol/L    Calcium 8.2 (L) 8.6 - 10.5 mg/dL    Albumin 3.30 (L) 3.50 - 5.20 g/dL    Phosphorus 2.9 2.5 - 4.5 mg/dL    Anion  Gap 12.7 5.0 - 15.0 mmol/L    BUN/Creatinine Ratio 4.8 (L) 7.0 - 25.0    eGFR 22.2 (L) >60.0 mL/min/1.73   BNP    Collection Time: 09/03/22  1:57 PM    Specimen: Blood   Result Value Ref Range    proBNP 63,037.0 (H) 0.0 - 900.0 pg/mL   CBC Auto Differential    Collection Time: 09/03/22  1:57 PM    Specimen: Blood   Result Value Ref Range    WBC 10.23 3.40 - 10.80 10*3/mm3    RBC 3.84 3.77 - 5.28 10*6/mm3    Hemoglobin 10.3 (L) 12.0 - 15.9 g/dL    Hematocrit 33.9 (L) 34.0 - 46.6 %    MCV 88.3 79.0 - 97.0 fL    MCH 26.8 26.6 - 33.0 pg    MCHC 30.4 (L) 31.5 - 35.7 g/dL    RDW 16.5 (H) 12.3 - 15.4 %    RDW-SD 52.4 37.0 - 54.0 fl    MPV 10.4 6.0 - 12.0 fL    Platelets 199 140 - 450 10*3/mm3    Neutrophil % 82.7 (H) 42.7 - 76.0 %    Lymphocyte % 10.0 (L) 19.6 - 45.3 %    Monocyte % 5.7 5.0 - 12.0 %    Eosinophil % 0.3 0.3 - 6.2 %    Basophil % 0.7 0.0 - 1.5 %    Immature Grans % 0.6 (H) 0.0 - 0.5 %    Neutrophils, Absolute 8.47 (H) 1.70 - 7.00 10*3/mm3    Lymphocytes, Absolute 1.02 0.70 - 3.10 10*3/mm3    Monocytes, Absolute 0.58 0.10 - 0.90 10*3/mm3    Eosinophils, Absolute 0.03 0.00 - 0.40 10*3/mm3    Basophils, Absolute 0.07 0.00 - 0.20 10*3/mm3    Immature Grans, Absolute 0.06 (H) 0.00 - 0.05 10*3/mm3    nRBC 0.0 0.0 - 0.2 /100 WBC   ECG 12 Lead    Collection Time: 09/03/22  2:07 PM   Result Value Ref Range    QT Interval 411 ms       I ordered the above labs and reviewed the results.    RADIOLOGY  XR Chest 1 View    Result Date: 9/3/2022  CHEST SINGLE VIEW  HISTORY: Dyspnea, increasing shortness of air.  COMPARISON: Two-view chest 08/25/2022.  FINDINGS: There is cardiomegaly and vascular engorgement with mild perihilar hydrostatic pulmonary edema. Moderate left and small right pleural effusions are present and there is opacity left lung base due to atelectasis or infiltrate. A dual lumen right central venous catheter extends to the right atrium.      Findings of mild CHF with cardiomegaly and vascular engorgement and  mild perihilar hydrostatic edema. Left greater than right pleural effusions with unchanged left basilar opacity. No pneumothorax.  This report was finalized on 9/3/2022 2:01 PM by Dr. Aguila Pulido M.D.        I ordered the above noted radiological studies. I reviewed the images and results. I agree with the radiologist interpretation.    PROCEDURES  Procedures    MEDICATIONS GIVEN IN ER  Medications   furosemide (LASIX) injection 80 mg (80 mg Intravenous Given 9/3/22 1528)       PROGRESS, DATA ANALYSIS, CONSULTS, AND MEDICAL DECISION MAKING  A complete history and physical exam have been performed.  All available laboratory and imaging results have been reviewed by myself prior to disposition.    MDM  After the initial H&P, I discussed pertinent information from history and physical exam with patient/family.  Discussed differential diagnosis.  Discussed plan for ED evaluation/workup/treatment.  All questions answered.  Patient/family is agreeable with plan.  ED Course as of 09/03/22 1604   Sat Sep 03, 2022   1506 My differential diagnosis for dyspnea includes but is not limited to:  Asthma, COPD, pneumonia, pulmonary embolus, acute respiratory distress syndrome, pneumothorax, pleural effusion, pulmonary fibrosis, congestive heart failure, myocardial infarction, DKA, uremia, acidosis, sepsis, anemia, drug related, hyperventilation, CNS disease     [JG]   1507 EKG independently viewed and contemporaneously interpreted by ED physician. Time: 1407.  Rate 88.  Interpretation: Atrial flutter, left axis deviation, left anterior fascicular block, no ST elevation or depression, T wave inversion V3 through V6 as well as leads II, III and aVF. [JG]   1507 When compared with prior EKG on 8/21/2022, EKG is unchanged other than patient had atrial flutter with RVR at that time as well as prolonged QTC both of which are not present on current EKG.  T wave inversions are unchanged. [JG]   1524 Medical history reviewed and  significant for: 56-year-old female with history of end-stage renal disease on dialysis last admitted to hospital on the 19th of last month.  Patient had thrombus in dialysis catheter, evaluated by vascular surgery, no plans for surgical intervention.  Patient was also seen by nephrology and cardiology services, deemed stable for discharge. [JG]   1528 Patient volume overloaded with pulmonary edema, patient hypoxic, requiring supplemental oxygen.  No signs of infection, no acute cardiac events.  Giving diuretic, consulting nephrology as patient is volume overloaded, consulting hospitalist for admission. [JG]   1538 Phone call with Dr. Boston, [facility].  Discussed the patient, relevant history, exam, diagnostics, ED findings/progress, and concerns. They agree to admit the patient to telemetry. Care assumed by the admitting physician at this time.     [JG]   1603 Phone call with Dr. Lange, nephrology..  Discussed the patient, relevant history, exam, diagnostics, ED findings/progress, and concerns.  He will arrange for dialysis.  They agree to consult.    [JG]      ED Course User Index  [JG] Basilio Flynn MD       AS OF 16:04 EDT VITALS:    BP - 150/89  HR - 81  TEMP - 97.6 °F (36.4 °C) (Tympanic)  O2 SATS - 96%    DIAGNOSIS  Final diagnoses:   Acute pulmonary edema (HCC)   Hypoxia   Hypervolemia, unspecified hypervolemia type   ESRD on dialysis (HCC)   Chronic anemia   Hyperglycemia         DISPOSITION  ADMISSION    Discussed treatment plan and reason for admission with pt/family and admitting physician.  Pt/family voiced understanding of the plan for admission for further testing/treatment as needed.          Basilio Flynn MD  09/03/22 6015

## 2022-09-04 ENCOUNTER — READMISSION MANAGEMENT (OUTPATIENT)
Dept: CALL CENTER | Facility: HOSPITAL | Age: 57
End: 2022-09-04

## 2022-09-04 PROBLEM — I50.22 CHRONIC SYSTOLIC CHF (CONGESTIVE HEART FAILURE): Status: ACTIVE | Noted: 2022-09-04

## 2022-09-04 PROBLEM — T82.868A THROMBUS OF VENOUS DIALYSIS CATHETER: Status: ACTIVE | Noted: 2022-01-01

## 2022-09-04 PROBLEM — I48.92 ATRIAL FLUTTER: Status: ACTIVE | Noted: 2022-01-01

## 2022-09-04 LAB
ALBUMIN SERPL-MCNC: 2.9 G/DL (ref 3.5–5.2)
ANION GAP SERPL CALCULATED.3IONS-SCNC: 10.3 MMOL/L (ref 5–15)
BUN SERPL-MCNC: 16 MG/DL (ref 6–20)
BUN/CREAT SERPL: 5.4 (ref 7–25)
CALCIUM SPEC-SCNC: 7.8 MG/DL (ref 8.6–10.5)
CHLORIDE SERPL-SCNC: 97 MMOL/L (ref 98–107)
CO2 SERPL-SCNC: 27.7 MMOL/L (ref 22–29)
CREAT SERPL-MCNC: 2.95 MG/DL (ref 0.57–1)
DEPRECATED RDW RBC AUTO: 50.1 FL (ref 37–54)
EGFRCR SERPLBLD CKD-EPI 2021: 18.1 ML/MIN/1.73
ERYTHROCYTE [DISTWIDTH] IN BLOOD BY AUTOMATED COUNT: 16.2 % (ref 12.3–15.4)
GLUCOSE BLDC GLUCOMTR-MCNC: 118 MG/DL (ref 70–130)
GLUCOSE BLDC GLUCOMTR-MCNC: 139 MG/DL (ref 70–130)
GLUCOSE BLDC GLUCOMTR-MCNC: 246 MG/DL (ref 70–130)
GLUCOSE BLDC GLUCOMTR-MCNC: 50 MG/DL (ref 70–130)
GLUCOSE BLDC GLUCOMTR-MCNC: 50 MG/DL (ref 70–130)
GLUCOSE BLDC GLUCOMTR-MCNC: 81 MG/DL (ref 70–130)
GLUCOSE BLDC GLUCOMTR-MCNC: 94 MG/DL (ref 70–130)
GLUCOSE SERPL-MCNC: 239 MG/DL (ref 65–99)
HBA1C MFR BLD: 7.7 % (ref 4.8–5.6)
HCT VFR BLD AUTO: 30.4 % (ref 34–46.6)
HGB BLD-MCNC: 9.5 G/DL (ref 12–15.9)
MAGNESIUM SERPL-MCNC: 1.8 MG/DL (ref 1.6–2.6)
MCH RBC QN AUTO: 26.7 PG (ref 26.6–33)
MCHC RBC AUTO-ENTMCNC: 31.3 G/DL (ref 31.5–35.7)
MCV RBC AUTO: 85.4 FL (ref 79–97)
PHOSPHATE SERPL-MCNC: 3.6 MG/DL (ref 2.5–4.5)
PLATELET # BLD AUTO: 170 10*3/MM3 (ref 140–450)
PMV BLD AUTO: 10.6 FL (ref 6–12)
POTASSIUM SERPL-SCNC: 4.7 MMOL/L (ref 3.5–5.2)
RBC # BLD AUTO: 3.56 10*6/MM3 (ref 3.77–5.28)
SODIUM SERPL-SCNC: 135 MMOL/L (ref 136–145)
WBC NRBC COR # BLD: 8.51 10*3/MM3 (ref 3.4–10.8)

## 2022-09-04 PROCEDURE — 25010000002 ONDANSETRON PER 1 MG: Performed by: INTERNAL MEDICINE

## 2022-09-04 PROCEDURE — 82962 GLUCOSE BLOOD TEST: CPT

## 2022-09-04 PROCEDURE — G0378 HOSPITAL OBSERVATION PER HR: HCPCS

## 2022-09-04 PROCEDURE — 80069 RENAL FUNCTION PANEL: CPT | Performed by: INTERNAL MEDICINE

## 2022-09-04 PROCEDURE — 83735 ASSAY OF MAGNESIUM: CPT | Performed by: INTERNAL MEDICINE

## 2022-09-04 PROCEDURE — 63710000001 INSULIN LISPRO (HUMAN) PER 5 UNITS: Performed by: INTERNAL MEDICINE

## 2022-09-04 PROCEDURE — 97162 PT EVAL MOD COMPLEX 30 MIN: CPT

## 2022-09-04 PROCEDURE — 63710000001 ONDANSETRON PER 8 MG: Performed by: INTERNAL MEDICINE

## 2022-09-04 PROCEDURE — 83036 HEMOGLOBIN GLYCOSYLATED A1C: CPT | Performed by: INTERNAL MEDICINE

## 2022-09-04 PROCEDURE — 97530 THERAPEUTIC ACTIVITIES: CPT

## 2022-09-04 PROCEDURE — 99214 OFFICE O/P EST MOD 30 MIN: CPT | Performed by: INTERNAL MEDICINE

## 2022-09-04 PROCEDURE — 85027 COMPLETE CBC AUTOMATED: CPT | Performed by: INTERNAL MEDICINE

## 2022-09-04 RX ORDER — CLONIDINE HYDROCHLORIDE 0.1 MG/1
0.1 TABLET ORAL EVERY 12 HOURS SCHEDULED
Status: DISCONTINUED | OUTPATIENT
Start: 2022-09-04 | End: 2022-09-08

## 2022-09-04 RX ADMIN — AMLODIPINE BESYLATE 5 MG: 5 TABLET ORAL at 08:28

## 2022-09-04 RX ADMIN — SPIRONOLACTONE 100 MG: 100 TABLET, FILM COATED ORAL at 11:44

## 2022-09-04 RX ADMIN — SERTRALINE 150 MG: 100 TABLET, FILM COATED ORAL at 08:28

## 2022-09-04 RX ADMIN — ONDANSETRON 4 MG: 2 INJECTION INTRAMUSCULAR; INTRAVENOUS at 02:20

## 2022-09-04 RX ADMIN — HYDRALAZINE HYDROCHLORIDE 100 MG: 50 TABLET, FILM COATED ORAL at 21:05

## 2022-09-04 RX ADMIN — SODIUM ZIRCONIUM CYCLOSILICATE 5 G: 5 POWDER, FOR SUSPENSION ORAL at 11:43

## 2022-09-04 RX ADMIN — ASPIRIN 81 MG: 81 TABLET, COATED ORAL at 08:27

## 2022-09-04 RX ADMIN — HYDRALAZINE HYDROCHLORIDE 100 MG: 50 TABLET, FILM COATED ORAL at 15:54

## 2022-09-04 RX ADMIN — INSULIN GLARGINE-YFGN 10 UNITS: 100 INJECTION, SOLUTION SUBCUTANEOUS at 23:10

## 2022-09-04 RX ADMIN — CLONIDINE HYDROCHLORIDE 0.1 MG: 0.1 TABLET ORAL at 06:22

## 2022-09-04 RX ADMIN — Medication 1 MG: at 08:28

## 2022-09-04 RX ADMIN — ROPINIROLE HYDROCHLORIDE 0.75 MG: 0.5 TABLET, FILM COATED ORAL at 21:05

## 2022-09-04 RX ADMIN — ONDANSETRON HYDROCHLORIDE 4 MG: 4 TABLET, FILM COATED ORAL at 15:58

## 2022-09-04 RX ADMIN — APIXABAN 5 MG: 5 TABLET, FILM COATED ORAL at 08:28

## 2022-09-04 RX ADMIN — HYDRALAZINE HYDROCHLORIDE 100 MG: 50 TABLET, FILM COATED ORAL at 06:22

## 2022-09-04 RX ADMIN — LEVOTHYROXINE SODIUM 200 MCG: 0.1 TABLET ORAL at 06:22

## 2022-09-04 RX ADMIN — CLONIDINE HYDROCHLORIDE 0.1 MG: 0.1 TABLET ORAL at 15:54

## 2022-09-04 RX ADMIN — APIXABAN 5 MG: 5 TABLET, FILM COATED ORAL at 21:05

## 2022-09-04 RX ADMIN — GABAPENTIN 100 MG: 100 CAPSULE ORAL at 11:47

## 2022-09-04 RX ADMIN — LOSARTAN POTASSIUM 100 MG: 100 TABLET, FILM COATED ORAL at 08:27

## 2022-09-04 RX ADMIN — PANTOPRAZOLE SODIUM 40 MG: 40 TABLET, DELAYED RELEASE ORAL at 08:28

## 2022-09-04 RX ADMIN — INSULIN LISPRO 4 UNITS: 100 INJECTION, SOLUTION INTRAVENOUS; SUBCUTANEOUS at 08:28

## 2022-09-04 RX ADMIN — INSULIN GLARGINE-YFGN 10 UNITS: 100 INJECTION, SOLUTION SUBCUTANEOUS at 08:28

## 2022-09-04 NOTE — PLAN OF CARE
Goal Outcome Evaluation:  Plan of Care Reviewed With: patient        Progress: no change  Outcome Evaluation: Patients blood pressure elevated, all other vitals stable. Patient denies pain. Patient reports some nausea. Patient had dialysis this shift and 3liters removed. Patient alert and oriented x4. Patient blood sugar elevated. Patient denies any other complaints. Will continue to monitor.

## 2022-09-04 NOTE — H&P
HISTORY AND PHYSICAL   [facility]        Date of Admission: 9/3/2022  Patient Identification:  Name: Zaina Martinez  Age: 56 y.o.  Sex: female  :  1965  MRN: [id number]                     Primary Care Physician: System, Provider Not In    Chief Complaint:  56 year old female who presented to the emergency room with shortness of breath; she has a history of esrd and is on hd; she has not missed any of her treatments; the dyspnea started last week and has progressively worsened; she denies fever or chills; she has had a dry cough; she was last dialyzed yesterday     History of Present Illness:   As above    Past Medical History:  Past Medical History:   Diagnosis Date   • Acute CVA (cerebrovascular accident) ([facility]) 2022   • Acute on chronic diastolic CHF (congestive heart failure) ([facility])    • CAD (coronary artery disease) 2021   • Diabetes ([facility])    • Disease of thyroid gland    • GERD (gastroesophageal reflux disease)    • Hyperlipidemia 2018   • Hypertension    • Rheumatoid arthritis (HCC)      Past Surgical History:  Past Surgical History:   Procedure Laterality Date   • CHOLECYSTECTOMY WITH INTRAOPERATIVE CHOLANGIOGRAM N/A 1/10/2022    Procedure: Laparoscopic cholecystectomy with intraoperative cholangiogram;  Surgeon: Ramana Raygoza MD;  Location: [facility];  Service: General;  Laterality: N/A;   • EYE SURGERY     • HYSTERECTOMY     • INSERTION HEMODIALYSIS CATHETER N/A 2021    Procedure: HEMODIALYSIS CATHETER INSERTION;  Surgeon: Keli Salazar MD;  Location: [facility] OR ;  Service: Vascular;  Laterality: N/A;   • INSERTION HEMODIALYSIS CATHETER N/A 5/3/2022    Procedure: TUNNELED CATHETER PLACEMENT;  Surgeon: Keli Salazar MD;  Location: [facility] OR;  Service: Vascular;  Laterality: N/A;   • MUSCLE BIOPSY Left 6/15/2022    Procedure: Left quadriceps muscle biopsy;  Surgeon: Marques Ma MD;  Location: [facility] OR;  Service:  Neurosurgery;  Laterality: Left;      Home Meds:  Medications Prior to Admission   Medication Sig Dispense Refill Last Dose   • amLODIPine (NORVASC) 5 MG tablet Take 1 tablet by mouth 2 (Two) Times a Day. 60 tablet 0 9/3/2022 at Unknown time   • apixaban (ELIQUIS) 5 MG tablet tablet Take 1 tablet by mouth Every 12 (Twelve) Hours. Indications: Atrial Fibrillation 60 tablet 0 9/3/2022 at Unknown time   • aspirin 81 MG EC tablet Take 1 tablet by mouth Daily. 90 tablet 1 9/3/2022 at Unknown time   • cloNIDine (CATAPRES) 0.1 MG tablet Take 1 tablet by mouth Every 8 (Eight) Hours. 90 tablet 0 9/3/2022 at Unknown time   • folic acid (FOLVITE) 1 MG tablet Take 1 mg by mouth Daily.   9/3/2022 at Unknown time   • gabapentin (NEURONTIN) 100 MG capsule Take one tab po in afternoon daily (Patient taking differently: Take 100 mg by mouth Daily With Lunch. Take one tab po in afternoon daily) 30 capsule 1 9/2/2022 at Unknown time   • hydrALAZINE (APRESOLINE) 10 MG tablet Take 1 tablet by mouth Every 8 (Eight) Hours As Needed (SBP > 160. Do not give on hemodialysis days.). 15 tablet 1 9/3/2022 at Unknown time   • hydrALAZINE (APRESOLINE) 100 MG tablet Take 1 tablet by mouth Every 8 (Eight) Hours. 90 tablet 1 9/3/2022 at Unknown time   • insulin glargine (LANTUS, SEMGLEE) 100 UNIT/ML injection Inject 10 Units under the skin into the appropriate area as directed Every 12 (Twelve) Hours.  12 9/3/2022 at Unknown time   • insulin lispro (ADMELOG) 100 UNIT/ML injection Inject 0-14 Units under the skin into the appropriate area as directed 3 (Three) Times a Day Before Meals. (Patient taking differently: Inject 0-14 Units under the skin into the appropriate area as directed 3 (Three) Times a Day Before Meals. Per sliding Scale)  12 9/3/2022 at Unknown time   • levothyroxine (SYNTHROID, LEVOTHROID) 200 MCG tablet Take 1 tablet by mouth Every Morning. 30 tablet 0 9/3/2022 at Unknown time   • losartan (COZAAR) 100 MG tablet Take 1 tablet by  mouth Daily. 30 tablet 1 9/3/2022 at Unknown time   • miconazole (MICOTIN) 2 % cream Apply 1 application topically to the appropriate area as directed Every 12 (Twelve) Hours. (Patient taking differently: Apply 1 application topically to the appropriate area as directed Every 12 (Twelve) Hours. Lower Back Rash)   9/3/2022 at Unknown time   • oxyCODONE (ROXICODONE) 5 MG immediate release tablet Take 1 tablet by mouth Every 4 (Four) Hours As Needed for Severe Pain  for up to 60 doses. Taper off over time (Patient taking differently: Take 5 mg by mouth Every 4 (Four) Hours As Needed for Severe Pain.) 60 tablet 0 9/3/2022 at Unknown time   • pantoprazole (PROTONIX) 40 MG EC tablet Take 1 tablet by mouth Daily. 90 tablet 0 9/3/2022 at Unknown time   • polyethylene glycol (MIRALAX) 17 g packet Take 17 g by mouth Daily As Needed (constipation).   Past Week at Unknown time   • predniSONE (DELTASONE) 5 MG tablet Two tabs po with breakfast July 26-July 28, 1.5 tabs with breakfast July 29-August 4, then 1 tab daily starting Friday August 5, 2022 and continue on that dose (Patient taking differently: Take 5 mg by mouth See Admin Instructions. Two tabs po with breakfast July 26-July 28, 1.5 tabs with breakfast July 29-August 4, then 1 tab daily starting Friday August 5, 2022 and continue on that dose) 35 tablet 1 9/3/2022 at Unknown time   • rOPINIRole (REQUIP) 0.25 MG tablet Take 0.75 mg by mouth every night at bedtime.   9/3/2022 at Unknown time   • sertraline (ZOLOFT) 50 MG tablet Take 3 tablets by mouth Daily. 90 tablet 1 9/3/2022 at Unknown time   • sodium zirconium cyclosilicate (LOKELMA) 5 g pack Take 5 g by mouth Daily. 30 each 0 9/3/2022 at Unknown time   • spironolactone (ALDACTONE) 100 MG tablet Take 1 tablet by mouth Daily. 30 tablet 0 9/3/2022 at Unknown time   • tamsulosin (FLOMAX) 0.4 MG capsule 24 hr capsule Take 1 capsule by mouth Daily.   9/3/2022 at Unknown time   • epoetin brooke-epbx (RETACRIT) 09835 UNIT/ML  injection Inject 1 mL under the skin into the appropriate area as directed 3 (Three) Times a Week. Indications: ESRD on Dialysis (Patient taking differently: Inject 10,000 Units under the skin into the appropriate area as directed 3 (Three) Times a Week. Monday, Wednesday, and Friday  Indications: ESRD on Dialysis) 6.6 mL         Allergies:  Allergies   Allergen Reactions   • Contrast Dye Confusion   • Ibuprofen Other (See Comments)     Kidney disease   • Prochlorperazine Other (See Comments)     Dystonic reaction          Immunizations:  Immunization History   Administered Date(s) Administered   • Anthrax 02/28/2018   • Flu Vaccine Split Quad 11/15/2019   • Fluzone Quad >6mos (Multi-dose) 11/30/2018   • Hepatitis B Vaccine Adult IM 01/19/2022, 02/18/2022, 03/18/2022   • Influenza, Unspecified 11/30/2018   • PPD Test 01/16/2015     Social History:   Social History     Social History Narrative   • Not on file     Social History     Socioeconomic History   • Marital status:      Spouse name: Marv   Tobacco Use   • Smoking status: Never Smoker   • Smokeless tobacco: Never Used   Vaping Use   • Vaping Use: Never used   Substance and Sexual Activity   • Alcohol use: Never   • Drug use: Never   • Sexual activity: Defer       Family History:  Family History   Problem Relation Age of Onset   • Malig Hyperthermia Neg Hx         Review of Systems  See history of present illness and past medical history.  Patient denies headache, dizziness, syncope, falls, trauma, change in vision, change in hearing, change in taste, changes in weight, changes in appetite, focal weakness, numbness, or paresthesia.  Patient denies chest pain, palpitations,  sinus pressure, rhinorrhea, epistaxis, hemoptysis, nausea, vomiting,hematemesis, diarrhea, constipation or hematchezia.  Denies cold or heat intolerance, polydipsia, polyuria, polyphagia. Denies hematuria, pyuria, dysuria, hesitancy, frequency or urgency. Denies consumption of raw  "and under cooked meats foods or change in water source.  Denies fever, chills, sweats, night sweats.  Denies missing any routine medications. Remainder of ROS is negative.    Objective:  T Max 24 hrs: Temp (24hrs), Av.8 °F (36.6 °C), Min:97.6 °F (36.4 °C), Max:97.9 °F (36.6 °C)    Vitals Ranges:   Temp:  [97.6 °F (36.4 °C)-97.9 °F (36.6 °C)] 97.9 °F (36.6 °C)  Heart Rate:  [] 80  Resp:  [16-18] 18  BP: (129-166)/() 165/111      Exam:  BP (!) 165/111   Pulse 80   Temp 97.9 °F (36.6 °C) (Oral)   Resp 18   Ht 157.5 cm (62.01\")   Wt 64.9 kg (143 lb 1.3 oz)   SpO2 96%   BMI 26.16 kg/m²     General Appearance:    Alert, cooperative, no distress, appears stated age; chronically ill appearing   Head:    Normocephalic, without obvious abnormality, atraumatic   Eyes:    PERRL, conjunctivae/corneas clear, EOM's intact, both eyes   Ears:    Normal external ear canals, both ears   Nose:   Nares normal, septum midline, mucosa normal, no drainage    or sinus tenderness   Throat:   Lips, mucosa, and tongue normal   Neck:   Supple, symmetrical, trachea midline, no adenopathy;     thyroid:  no enlargement/tenderness/nodules; no carotid    bruit or JVD   Back:     Symmetric, no curvature, ROM normal, no CVA tenderness   Lungs:      Decreased breath sounds bilaterally, respirations unlabored   Chest Wall:    No tenderness or deformity    Heart:    Regular rate and rhythm, S1 and S2 normal, no murmur, rub   or gallop   Abdomen:     Soft, nontender, bowel sounds active all four quadrants,     no masses, no hepatomegaly, no splenomegaly   Extremities:   Extremities normal, atraumatic, no cyanosis or edema   Pulses:   2+ and symmetric all extremities   Skin:   Skin color, texture, turgor normal, no rashes or lesions   Lymph nodes:   Cervical, supraclavicular, and axillary nodes normal   Neurologic:   CNII-XII intact, normal strength, sensation intact throughout      .    Data Review:  Labs in chart were " reviewed.  WBC   Date Value Ref Range Status   09/03/2022 10.23 3.40 - 10.80 10*3/mm3 Final     Hemoglobin   Date Value Ref Range Status   09/03/2022 10.3 (L) 12.0 - 15.9 g/dL Final     Hematocrit   Date Value Ref Range Status   09/03/2022 33.9 (L) 34.0 - 46.6 % Final     Platelets   Date Value Ref Range Status   09/03/2022 199 140 - 450 10*3/mm3 Final     Sodium   Date Value Ref Range Status   09/03/2022 135 (L) 136 - 145 mmol/L Final     Potassium   Date Value Ref Range Status   09/03/2022 4.7 3.5 - 5.2 mmol/L Final     Comment:     Slight hemolysis detected by analyzer. Results may be affected.     Chloride   Date Value Ref Range Status   09/03/2022 96 (L) 98 - 107 mmol/L Final     CO2   Date Value Ref Range Status   09/03/2022 26.3 22.0 - 29.0 mmol/L Final     BUN   Date Value Ref Range Status   09/03/2022 12 6 - 20 mg/dL Final     Creatinine   Date Value Ref Range Status   09/03/2022 2.49 (H) 0.57 - 1.00 mg/dL Final     Glucose   Date Value Ref Range Status   09/03/2022 324 (H) 65 - 99 mg/dL Final     Calcium   Date Value Ref Range Status   09/03/2022 8.2 (L) 8.6 - 10.5 mg/dL Final     Phosphorus   Date Value Ref Range Status   09/03/2022 2.9 2.5 - 4.5 mg/dL Final     No results found for: AST, ALT, ALKPHOS             Imaging Results (All)     Procedure Component Value Units Date/Time    XR Chest 1 View [[id number]] Collected: 09/03/22 1358     Updated: 09/03/22 1404    Narrative:      CHEST SINGLE VIEW     HISTORY: Dyspnea, increasing shortness of air.     COMPARISON: Two-view chest 08/25/2022.     FINDINGS: There is cardiomegaly and vascular engorgement with mild  perihilar hydrostatic pulmonary edema. Moderate left and small right  pleural effusions are present and there is opacity left lung base due to  atelectasis or infiltrate. A dual lumen right central venous catheter  extends to the right atrium.       Impression:      Findings of mild CHF with cardiomegaly and vascular  engorgement and mild perihilar  hydrostatic edema. Left greater than  right pleural effusions with unchanged left basilar opacity. No  pneumothorax.     This report was finalized on 9/3/2022 2:01 PM by Dr. Aguila Pulido M.D.               Assessment:  Active Hospital Problems    Diagnosis  POA   • Acute pulmonary edema (HCC) [J81.0]  Yes      Resolved Hospital Problems   No resolved problems to display.   hypertension  Hypothyroidism  Diabetes  Rheumatoid arthritis  cad  esrd on had  Anemia  Acute on chronic diastolic chf    Plan:  Ultrafiltration in progress  Removal of 3 liters planned per dr garcia  Monitor on telemetry  Control blood sugar and blood pressure  Trend hgb  D.w patient and ED provider    Starla Boston MD  9/3/2022  21:19 EDT

## 2022-09-04 NOTE — CONSULTS
"Date of Hospital Visit: 9/3/2022  Date of consult: 2022  Encounter Provider: Tremayne Toledo MD  Place of Service: [facility] CARDIOLOGY  Patient Name: Zaina Martinez  :1965  Referral Provider: Alejo Weiss MD    Chief complaint: shortness of air    Reason for consult: established patient, atrial flutter    History of Present Illness  Zaina Martinez is a 56 year old female with a history of hypertension, hyperlipidemia, diabetes, hypothyroidism, end stage renal disease on hemodialysis, COVID pneumonia (2021), and rheumatoid arthritis on chronic prednisone. In May 2022, she had acute left-sided hemiparesis and was treated with alteplase, and subsequently had a intracranial hemorrhagic conversion.      Her history is extensive and this is the eight admission this year. During these admissions, she was seen by Dr. Montoya for ANJEL evaluation secondary to MRSA infection of hemodialysis catheter and by Dr. Brownlee for elevated troponin. She does not currently follow with a cardiologist in office.     She was recently admitted from - with complaints of chest pain. We saw the patient in consultation during this admission for pericardial effusion and elevated troponin. She also had episodes of atrial fibrillation/flutter while admitted, for which she was seen by EP. Their recommendation during admission was for rate control with goal \"generally less than 100 at rest\". She was also found to have a thrombus on her dialysis catheter, vascular surgery saw patient and no intervention was pursued. She was discharged on apixaban for this thrombus.     She presented to the emergency department with complaints of shortness of air. She reports shortness of breath since her discharge one week ago. Dyspnea is present at rest, worse with exertion, and patient endorses orthopnea. She denies leg swelling, however has noticed some swelling in her abdomen. Denies chest pain. She had a " full dialysis treatment yesterday, no improvement in dyspnea with treatment.   Vital signs were stable upon presentation.  CXR on arrival showed mild CHF with cardiomegaly, vascular engorgement, and bilateral (L>R) pleural effusions. BNP was 63,037.0. EKG showed atrial flutter, unchanged from previous. Lab work showed sodium 135, creatinine 2.49, and hemoglobin 10.3.    We were asked to see patient for management of her atrial flutter and CHF.    Cardiac testing:  ANJEL 8/26/22  · There is a dialysis catheter which extends into the right atrial cavity. There is a large and highly mobile echodensity attached to the distal portion of the dialysis catheter. The echodensity measures 1.4 cm x 0.86 cm, and is most consistent with a thrombus  · Estimated left ventricular EF = 55% Left ventricular systolic function is normal.  · Left ventricular wall thickness is consistent with mild concentric hypertrophy.  · Normal right ventricular cavity size and systolic function noted.  · Doppler interrogation shows severely reduced flow within the left atrial appendage. No evidence of a left atrial appendage thrombus was present.  · There is a small PFO noted on the agitated saline study  · Moderate mitral valve regurgitation is present  · Moderate tricuspid valve regurgitation is present.  · Calculated right ventricular systolic pressure from tricuspid regurgitation is 42 mmHg.  · There is a small focal area of severe plaque in the descending thoracic aorta. No significant plaques were seen in the aortic arch   · There is a small circumferential pericardial effusion without evidence of tamponade. There is a small left pleural effusion    Past Medical History:   Diagnosis Date   • Acute CVA (cerebrovascular accident) (Formerly [facility]) 5/1/2022   • Acute on chronic diastolic CHF (congestive heart failure) (Formerly [facility])    • CAD (coronary artery disease) 12/20/2021   • Diabetes (Formerly [facility])    • Disease of thyroid gland    • GERD (gastroesophageal reflux disease)     • Hyperlipidemia 11/30/2018   • Hypertension    • Rheumatoid arthritis (HCC)        Past Surgical History:   Procedure Laterality Date   • CHOLECYSTECTOMY WITH INTRAOPERATIVE CHOLANGIOGRAM N/A 1/10/2022    Procedure: Laparoscopic cholecystectomy with intraoperative cholangiogram;  Surgeon: Ramana Raygoza MD;  Location: [facility];  Service: General;  Laterality: N/A;   • EYE SURGERY     • HYSTERECTOMY     • INSERTION HEMODIALYSIS CATHETER N/A 12/6/2021    Procedure: HEMODIALYSIS CATHETER INSERTION;  Surgeon: Keli Salazar MD;  Location: [facility] OR 18/19;  Service: Vascular;  Laterality: N/A;   • INSERTION HEMODIALYSIS CATHETER N/A 5/3/2022    Procedure: TUNNELED CATHETER PLACEMENT;  Surgeon: Keli Salazar MD;  Location: [facility] OR;  Service: Vascular;  Laterality: N/A;   • MUSCLE BIOPSY Left 6/15/2022    Procedure: Left quadriceps muscle biopsy;  Surgeon: Marques Ma MD;  Location: [facility] OR;  Service: Neurosurgery;  Laterality: Left;       Medications Prior to Admission   Medication Sig Dispense Refill Last Dose   • amLODIPine (NORVASC) 5 MG tablet Take 1 tablet by mouth 2 (Two) Times a Day. 60 tablet 0 9/3/2022 at Unknown time   • apixaban (ELIQUIS) 5 MG tablet tablet Take 1 tablet by mouth Every 12 (Twelve) Hours. Indications: Atrial Fibrillation 60 tablet 0 9/3/2022 at Unknown time   • aspirin 81 MG EC tablet Take 1 tablet by mouth Daily. 90 tablet 1 9/3/2022 at Unknown time   • cloNIDine (CATAPRES) 0.1 MG tablet Take 1 tablet by mouth Every 8 (Eight) Hours. 90 tablet 0 9/3/2022 at Unknown time   • folic acid (FOLVITE) 1 MG tablet Take 1 mg by mouth Daily.   9/3/2022 at Unknown time   • gabapentin (NEURONTIN) 100 MG capsule Take one tab po in afternoon daily (Patient taking differently: Take 100 mg by mouth Daily With Lunch. Take one tab po in afternoon daily) 30 capsule 1 9/2/2022 at Unknown time   • hydrALAZINE (APRESOLINE) 10 MG tablet Take 1 tablet by mouth Every 8  (Eight) Hours As Needed (SBP > 160. Do not give on hemodialysis days.). 15 tablet 1 9/3/2022 at Unknown time   • hydrALAZINE (APRESOLINE) 100 MG tablet Take 1 tablet by mouth Every 8 (Eight) Hours. 90 tablet 1 9/3/2022 at Unknown time   • insulin glargine (LANTUS, SEMGLEE) 100 UNIT/ML injection Inject 10 Units under the skin into the appropriate area as directed Every 12 (Twelve) Hours.  12 9/3/2022 at Unknown time   • insulin lispro (ADMELOG) 100 UNIT/ML injection Inject 0-14 Units under the skin into the appropriate area as directed 3 (Three) Times a Day Before Meals. (Patient taking differently: Inject 0-14 Units under the skin into the appropriate area as directed 3 (Three) Times a Day Before Meals. Per sliding Scale)  12 9/3/2022 at Unknown time   • levothyroxine (SYNTHROID, LEVOTHROID) 200 MCG tablet Take 1 tablet by mouth Every Morning. 30 tablet 0 9/3/2022 at Unknown time   • losartan (COZAAR) 100 MG tablet Take 1 tablet by mouth Daily. 30 tablet 1 9/3/2022 at Unknown time   • miconazole (MICOTIN) 2 % cream Apply 1 application topically to the appropriate area as directed Every 12 (Twelve) Hours. (Patient taking differently: Apply 1 application topically to the appropriate area as directed Every 12 (Twelve) Hours. Lower Back Rash)   9/3/2022 at Unknown time   • oxyCODONE (ROXICODONE) 5 MG immediate release tablet Take 1 tablet by mouth Every 4 (Four) Hours As Needed for Severe Pain  for up to 60 doses. Taper off over time (Patient taking differently: Take 5 mg by mouth Every 4 (Four) Hours As Needed for Severe Pain.) 60 tablet 0 9/3/2022 at Unknown time   • pantoprazole (PROTONIX) 40 MG EC tablet Take 1 tablet by mouth Daily. 90 tablet 0 9/3/2022 at Unknown time   • polyethylene glycol (MIRALAX) 17 g packet Take 17 g by mouth Daily As Needed (constipation).   Past Week at Unknown time   • predniSONE (DELTASONE) 5 MG tablet Two tabs po with breakfast July 26-July 28, 1.5 tabs with breakfast July 29-August 4,  then 1 tab daily starting Friday August 5, 2022 and continue on that dose (Patient taking differently: Take 5 mg by mouth See Admin Instructions. Two tabs po with breakfast July 26-July 28, 1.5 tabs with breakfast July 29-August 4, then 1 tab daily starting Friday August 5, 2022 and continue on that dose) 35 tablet 1 9/3/2022 at Unknown time   • rOPINIRole (REQUIP) 0.25 MG tablet Take 0.75 mg by mouth every night at bedtime.   9/3/2022 at Unknown time   • sertraline (ZOLOFT) 50 MG tablet Take 3 tablets by mouth Daily. 90 tablet 1 9/3/2022 at Unknown time   • sodium zirconium cyclosilicate (LOKELMA) 5 g pack Take 5 g by mouth Daily. 30 each 0 9/3/2022 at Unknown time   • spironolactone (ALDACTONE) 100 MG tablet Take 1 tablet by mouth Daily. 30 tablet 0 9/3/2022 at Unknown time   • tamsulosin (FLOMAX) 0.4 MG capsule 24 hr capsule Take 1 capsule by mouth Daily.   9/3/2022 at Unknown time   • epoetin brooke-epbx (RETACRIT) 15951 UNIT/ML injection Inject 1 mL under the skin into the appropriate area as directed 3 (Three) Times a Week. Indications: ESRD on Dialysis (Patient taking differently: Inject 10,000 Units under the skin into the appropriate area as directed 3 (Three) Times a Week. Monday, Wednesday, and Friday  Indications: ESRD on Dialysis) 6.6 mL         Current Meds  Scheduled Meds:amLODIPine, 5 mg, Oral, BID  apixaban, 5 mg, Oral, Q12H  aspirin, 81 mg, Oral, Daily  cloNIDine, 0.1 mg, Oral, Q8H  [START ON 9/5/2022] epoetin brooke-epbx, 10,000 Units, Subcutaneous, Once per day on Mon Wed Fri  folic acid, 1 mg, Oral, Daily  gabapentin, 100 mg, Oral, Daily With Lunch  hydrALAZINE, 100 mg, Oral, Q8H  insulin glargine, 10 Units, Subcutaneous, Q12H  insulin lispro, 0-9 Units, Subcutaneous, TID With Meals  levothyroxine, 200 mcg, Oral, Q AM  losartan, 100 mg, Oral, Q24H  miconazole, 1 application, Topical, Q12H  pantoprazole, 40 mg, Oral, Daily  predniSONE, 5 mg, Oral, See Admin Instructions  rOPINIRole, 0.75 mg, Oral,  "Nightly  sertraline, 150 mg, Oral, Daily  sodium zirconium cyclosilicate, 5 g, Oral, Daily  spironolactone, 100 mg, Oral, Daily      Continuous Infusions:   PRN Meds:.•  acetaminophen  •  dextrose  •  dextrose  •  glucagon (human recombinant)  •  heparin (porcine)  •  hydrALAZINE  •  melatonin  •  metoprolol tartrate  •  nitroglycerin  •  ondansetron **OR** ondansetron  •  oxyCODONE  •  polyethylene glycol    Allergies as of 09/03/2022 - Reviewed 09/03/2022   Allergen Reaction Noted   • Contrast dye Confusion 04/28/2022   • Ibuprofen Other (See Comments) 01/24/2022   • Prochlorperazine Other (See Comments) 03/30/2021       Social History     Socioeconomic History   • Marital status:      Spouse name: Marv   Tobacco Use   • Smoking status: Never Smoker   • Smokeless tobacco: Never Used   Vaping Use   • Vaping Use: Never used   Substance and Sexual Activity   • Alcohol use: Never   • Drug use: Never   • Sexual activity: Defer       Family History   Problem Relation Age of Onset   • Malig Hyperthermia Neg Hx        REVIEW OF SYSTEMS:   All systems reviewed and pertinent positives include in HPI otherwise negative review of systems.       Objective:   Temp:  [97.6 °F (36.4 °C)-98.8 °F (37.1 °C)] 98.2 °F (36.8 °C)  Heart Rate:  [63-87] 83  Resp:  [16-20] 18  BP: (122-170)/() 122/80  Body mass index is 25.25 kg/m².  Flowsheet Rows    Flowsheet Row First Filed Value   Admission Height 157.5 cm (62.01\") Documented at 09/03/2022 1355   Admission Weight 64.9 kg (143 lb 1.3 oz) Documented at 09/03/2022 1355        Vitals:    09/04/22 1317   BP: 122/80   Pulse: 83   Resp: 18   Temp: 98.2 °F (36.8 °C)   SpO2: 93%       General Appearance:    Alert, cooperative, in no acute distress   Head:    Normocephalic, without obvious abnormality, atraumatic   Eyes:            Lids and lashes normal, conjunctivae and sclerae normal, no   icterus, no pallor, corneas clear, PERRLA   Ears:    Ears appear intact with no " abnormalities noted   Throat:   No oral lesions, no thrush, oral mucosa moist   Neck:   No adenopathy, supple, trachea midline, no thyromegaly, no   carotid bruit, no JVD   Back:     No kyphosis present, no scoliosis present, no skin lesions, erythema or scars, no tenderness to percussion or palpation, range of motion normal   Lungs:     Clear to auscultation,respirations regular, even and unlabored    Heart:    Regular rhythm and normal rate, normal S1 and S2, no murmur, no gallop, no rub, no click   Chest Wall:    No abnormalities observed   Abdomen:     Normal bowel sounds, no masses, no organomegaly, soft nontender, nondistended, no guarding, no rebound  tenderness   Extremities:   Moves all extremities well, no edema, no cyanosis, no redness   Pulses:   Pulses palpable and equal bilaterally. Normal radial, carotid, femoral, dorsalis pedis and posterior tibial pulses bilaterally. Normal abdominal aorta   Skin:  Neurology:   Psychiatric:   No bleeding, bruising or rash   Normal speech and cranial nerve exam, no focal deficit   Alert and oriented x 3, normal mood and affect                 Review of Data:      Results from last 7 days   Lab Units 09/04/22  0616   SODIUM mmol/L 135*   POTASSIUM mmol/L 4.7   CHLORIDE mmol/L 97*   CO2 mmol/L 27.7   BUN mg/dL 16   CREATININE mg/dL 2.95*   CALCIUM mg/dL 7.8*   GLUCOSE mg/dL 239*         @LABRCNTbnp@  Results from last 7 days   Lab Units 09/04/22  0616 09/03/22  1357   WBC 10*3/mm3 8.51 10.23   HEMOGLOBIN g/dL 9.5* 10.3*   HEMATOCRIT % 30.4* 33.9*   PLATELETS 10*3/mm3 170 199         Results from last 7 days   Lab Units 09/04/22  0616   MAGNESIUM mg/dL 1.8     @LABRCNTIP(chol,trig,hdl,ldl)    EKG      Baseline EKG        I personally viewed and interpreted the patient's EKG/Telemetry data  )  Patient Active Problem List   Diagnosis   • Renal insufficiency   • Hypertensive disorder   • Hypothyroidism   • Type 2 diabetes mellitus with kidney complication, with long-term  current use of insulin (HCC)   • Rheumatoid arthritis (HCC)   • Angioedema   • Esophageal dysmotility   • Anemia   • Medically noncompliant   • Myocardial infarction due to demand ischemia (HCC)   • Enteritis   • PRES (posterior reversible encephalopathy syndrome)   • Urine retention   • Klebsiella infection   • Superficial thrombophlebitis   • Generalized weakness   • ESRD (end stage renal disease) (HCC)   • CAD (coronary artery disease)   • Abnormal urinalysis   • Chronic diastolic CHF (congestive heart failure) (HCC)   • Pyelonephritis   • Calculus of gallbladder with acute on chronic cholecystitis without obstruction   • Pleural effusion on right   • Anemia due to chronic kidney disease, on chronic dialysis (HCC)   • Abnormal findings on diagnostic imaging of other specified body structures   • Acute upper respiratory infection   • Agitation   • Alkaline phosphatase raised   • Casts present in urine   • Cellulitis of toe   • Hip pain   • Community acquired pneumonia   • Depressive disorder   • Diarrhea of presumed infectious origin   • Difficult or painful urination   • Disease due to severe acute respiratory syndrome coronavirus 2 (SARS-CoV-2)   • Dyspnea   • Encounter for follow-up examination after completed treatment for conditions other than malignant neoplasm   • H/O: hypothyroidism   • Hyperlipidemia   • Hypomagnesemia   • Intractable vomiting with nausea   • Leukocytosis   • Luetscher's syndrome   • Need for influenza vaccination   • Restless legs   • Noncompliance with treatment   • Shoulder pain   • Urinary tract infectious disease   • Metabolic encephalopathy   • Abnormal findings on diagnostic imaging of abdomen   • Status post cholecystectomy   • Hyponatremia   • Leukocytosis   • Acute metabolic encephalopathy   • Encephalopathy, toxic   • Acute CVA (cerebrovascular accident) (HCC)   • Intracranial hemorrhage (HCC)   • Stroke (HCC)   • Abnormal urinalysis   • Diabetic muscle infarction (HCC)   •  Other insomnia   • Altered mental status   • History of stroke- R MCA s/p TPA with subsequent ICH with debility   • Heart murmur   • Bradycardia   • Left lower lobe pneumonia   • Metabolic encephalopathy   • Pericardial effusion   • Pleural effusion   • Acute pulmonary edema (HCC)   • Atrial flutter (HCC)   • Chronic systolic CHF (congestive heart failure) (HCC)   • Thrombus of venous dialysis catheter (HCC)         Assessment and Plan:    Mrs Martinez is a 56 years old female history of recurrent hospital admissions, hypertension, end-stage renal disease on hemodialysis, persistent atrial flutter, rheumatoid arthritis on chronic prednisone readmitted with worsening shortness of breath.  She was just released from the hospital about a week ago.  Atrial flutter diagnosed last month that is being treated with rate control strategy.  She was started on anticoagulation with Eliquis for thrombus on dialysis catheter.    1.  Worsening shortness of breath-likely from volume overload- she reports some improved symptoms since getting dialysis today.  Her usual schedule is Monday Wednesday Friday.  She reports feeling tired though.  2.  Atrial flutter with excellent heart rate control without AV radha blockers  3.  Dialysis catheter thrombus on Eliquis.  4.  History of small pericardial effusion-blood pressure running normal and no evidence for clinical tamponade.    Recommend volume optimization with dialysis.  PT/OT.  Telemetry monitoring while in the hospital.  No further testing from cardiac standpoint.    Cardiology will sign off.    Thank you for consulting with cardiology        Tremayne Toledo MD  09/04/22  15:36 EDT.  Time spent in reviewing chart, discussion and examination:

## 2022-09-04 NOTE — PROGRESS NOTES
[facility] Progress Note      Patient Name: Zaina Martinez  : 1965  MRN: [id number]  Primary Care Physician:  System, Provider Not In  Date of admission: 9/3/2022    Subjective     Interval History:   Breathing is better after 3 L removed with iUF yesterday   Remains on 4 L/min  Appetite is poor; no/V    Review of Systems:   As noted above    Objective     Vitals:   Temp:  [97.6 °F (36.4 °C)-98.8 °F (37.1 °C)] 98.2 °F (36.8 °C)  Heart Rate:  [63-87] 83  Resp:  [16-20] 18  BP: (122-170)/() 122/80  Flow (L/min):  [4] 4  No intake or output data in the 24 hours ending 22 1537    Physical Exam:    Constitutional: Awake, alert, chronically ill, slight periorbital edema  HEENT: Sclera anicteric, no conjunctival injection  Neck: Supple, no carotid bruit, trachea at midline, no JVD  Respiratory: Decreased BS in bases, L worse than R; not labored on 4 L/min  Cardiovascular: Irregularly irregular, not tachycardic, 2/6M, no rub  Gastrointestinal: BS +, soft, nontender, distended  Vascular: Right chest TDC  : No palpable bladder  Musculoskeletal:  Trace edema; +clubbing  Psychiatric: Flat affect, cooperative, fully oriented  Neurologic:  moving all extremities, normal speech and mental status  Skin: Warm and dry     Scheduled Meds:     amLODIPine, 5 mg, Oral, BID  apixaban, 5 mg, Oral, Q12H  aspirin, 81 mg, Oral, Daily  cloNIDine, 0.1 mg, Oral, Q8H  [START ON 2022] epoetin brooke-epbx, 10,000 Units, Subcutaneous, Once per day on   folic acid, 1 mg, Oral, Daily  gabapentin, 100 mg, Oral, Daily With Lunch  hydrALAZINE, 100 mg, Oral, Q8H  insulin glargine, 10 Units, Subcutaneous, Q12H  insulin lispro, 0-9 Units, Subcutaneous, TID With Meals  levothyroxine, 200 mcg, Oral, Q AM  losartan, 100 mg, Oral, Q24H  miconazole, 1 application, Topical, Q12H  pantoprazole, 40 mg, Oral, Daily  predniSONE, 5 mg, Oral, See Admin Instructions  rOPINIRole, 0.75 mg, Oral,  Nightly  sertraline, 150 mg, Oral, Daily  sodium zirconium cyclosilicate, 5 g, Oral, Daily  spironolactone, 100 mg, Oral, Daily      IV Meds:        Results Reviewed:   I have personally reviewed the results from the time of this admission to 9/4/2022 15:37 EDT     Results from last 7 days   Lab Units 09/04/22  0616 09/03/22  1357   SODIUM mmol/L 135* 135*   POTASSIUM mmol/L 4.7 4.7   CHLORIDE mmol/L 97* 96*   CO2 mmol/L 27.7 26.3   BUN mg/dL 16 12   CREATININE mg/dL 2.95* 2.49*   CALCIUM mg/dL 7.8* 8.2*   GLUCOSE mg/dL 239* 324*       Estimated Creatinine Clearance: 18.5 mL/min (A) (by C-G formula based on SCr of 2.95 mg/dL (H)).    Results from last 7 days   Lab Units 09/04/22  0616 09/03/22  1357   MAGNESIUM mg/dL 1.8  --    PHOSPHORUS mg/dL 3.6 2.9             Results from last 7 days   Lab Units 09/04/22  0616 09/03/22  1357   WBC 10*3/mm3 8.51 10.23   HEMOGLOBIN g/dL 9.5* 10.3*   PLATELETS 10*3/mm3 170 199             Assessment / Plan     ASSESSMENT:  1.  ESRD: Volume excess by exam and imaging; stable potassium; dilutional hyponatremia; normal anion gap.  Had extra iUF yesterday  2.  Bilateral pleural effusions; also has chronic small pericardial effusion, with echo late last month negative for tamponade  3.  Atrial fibrillation/flutter  4.  Hypertension of ESRD, exacerbated by volume overload  5.  History of noncompliance with fluid restriction and medications in general  6.  Diabetes mellitus with renal complications; uncontrolled  7.  Anemia of ESRD: Hemoglobin at goal    PLAN:  1.  HD tomorrow (MWF schedule usually)  2.  Reduce clonidine (from TID to BID) and stop amlodipine to allow safer volume removal  3.  Avoid spironolactone in the setting of ESRD    Thank you for involving us in the care of Zaina Martinez.  Please feel free to call with any questions.    Alejo Lange MD  09/04/22  15:37 EDT    [facility]  [phone]      Much of this encounter note is an  electronic transcription/translation of spoken language to printed text. The electronic translation of spoken language may permit erroneous, or at times, nonsensical words or phrases to be inadvertently transcribed; Although I have reviewed the note for such errors, some may still exist.

## 2022-09-04 NOTE — PLAN OF CARE
Goal Outcome Evaluation:  Plan of Care Reviewed With: patient           Outcome Evaluation: Pt. is a 56 year old Female admitted to the hospital with c/o SOA.  Pt. reports that prior to admission she was independent with functional mobility and used a Rwx for ambulation.  Pt. currently presents with decreased strength, decreased balance, and decreased tolerance to functional activity. This AM, pt. able to ambulate 25 feet, CGA x 1, with use of Rwx.  Pt. requires CGA x 1 for bed mobility and CGA x 1 for sit <-> stand transfers. BLE ther. ex. program x 5 reps completed for general strengthening.  Pt. will benefit from skilled inpt. P.T. to address her functional deficits and to assist pt. in regaining her maximum level of independence with functional mobility.    Patient was wearing a face mask during this therapy encounter. Therapist used appropriate personal protective equipment including eye protection, mask, and gloves.  Mask used was standard procedure mask. Appropriate PPE was worn during the entire therapy session. Hand hygiene was completed before and after therapy session. Patient is not in enhanced droplet precautions.

## 2022-09-04 NOTE — PROGRESS NOTES
Name: Zaina Martinez ADMIT: 9/3/2022   : 1965  PCP: System, Provider Not In    MRN: [id number] LOS: 0 days   AGE/SEX: 56 y.o. female  ROOM: [facility]/   Subjective   Chief Complaint   Patient presents with   • Shortness of Breath      She reports dyspnea is about the same as admission however she does feel overall slightly improved from presentation prior to HD last night. No CP or palpitations. No productive cough or fever. No NVD or abdominal pain.    Objective   Vital Signs  Temp:  [97.6 °F (36.4 °C)-98.8 °F (37.1 °C)] 98.2 °F (36.8 °C)  Heart Rate:  [] 83  Resp:  [16-20] 18  BP: (122-170)/() 122/80  SpO2:  [90 %-98 %] 93 %  on  Flow (L/min):  [4] 4;   Device (Oxygen Therapy): nasal cannula  Body mass index is 25.25 kg/m².    Physical Exam  Vitals and nursing note reviewed.   Constitutional:       Appearance: She is ill-appearing (chronically). She is not diaphoretic.   HENT:      Head: Normocephalic and atraumatic.   Eyes:      General:         Right eye: No discharge.         Left eye: No discharge.      Conjunctiva/sclera: Conjunctivae normal.   Cardiovascular:      Rate and Rhythm: Normal rate. Rhythm irregular.   Pulmonary:      Effort: Pulmonary effort is normal.      Breath sounds: Decreased breath sounds present. No wheezing.   Abdominal:      General: There is no distension.      Palpations: Abdomen is soft.      Tenderness: There is no abdominal tenderness. There is no guarding or rebound.   Musculoskeletal:         General: No swelling or tenderness.      Cervical back: Neck supple. No tenderness.   Skin:     General: Skin is warm and dry.   Neurological:      Mental Status: She is alert. Mental status is at baseline.   Psychiatric:         Mood and Affect: Mood normal.         Behavior: Behavior normal.         Results Review:       I reviewed the patient's new clinical results.     I reviewed imaging, agree with interpretation.     I reviewed telemetry/EKG results, atrial flutter  with normal ventricular rate on tele.      Results from last 7 days   Lab Units 09/04/22  0616 09/03/22  1357   WBC 10*3/mm3 8.51 10.23   HEMOGLOBIN g/dL 9.5* 10.3*   PLATELETS 10*3/mm3 170 199     Results from last 7 days   Lab Units 09/04/22  0616 09/03/22  1357   SODIUM mmol/L 135* 135*   POTASSIUM mmol/L 4.7 4.7   CHLORIDE mmol/L 97* 96*   CO2 mmol/L 27.7 26.3   BUN mg/dL 16 12   CREATININE mg/dL 2.95* 2.49*   GLUCOSE mg/dL 239* 324*   Estimated Creatinine Clearance: 18.5 mL/min (A) (by C-G formula based on SCr of 2.95 mg/dL (H)).  Results from last 7 days   Lab Units 09/04/22  0616 09/03/22  1357   CALCIUM mg/dL 7.8* 8.2*   ALBUMIN g/dL 2.90* 3.30*   MAGNESIUM mg/dL 1.8  --    PHOSPHORUS mg/dL 3.6 2.9          amLODIPine, 5 mg, Oral, BID  apixaban, 5 mg, Oral, Q12H  aspirin, 81 mg, Oral, Daily  cloNIDine, 0.1 mg, Oral, Q8H  [START ON 9/5/2022] epoetin brooke-epbx, 10,000 Units, Subcutaneous, Once per day on Mon Wed Fri  folic acid, 1 mg, Oral, Daily  gabapentin, 100 mg, Oral, Daily With Lunch  hydrALAZINE, 100 mg, Oral, Q8H  insulin glargine, 10 Units, Subcutaneous, Q12H  insulin lispro, 0-9 Units, Subcutaneous, TID With Meals  levothyroxine, 200 mcg, Oral, Q AM  losartan, 100 mg, Oral, Q24H  miconazole, 1 application, Topical, Q12H  pantoprazole, 40 mg, Oral, Daily  predniSONE, 5 mg, Oral, See Admin Instructions  rOPINIRole, 0.75 mg, Oral, Nightly  sertraline, 150 mg, Oral, Daily  sodium zirconium cyclosilicate, 5 g, Oral, Daily  spironolactone, 100 mg, Oral, Daily       Diet Regular; Cardiac    Assessment & Plan      Active Hospital Problems    Diagnosis  POA   • **Acute pulmonary edema (HCC) [J81.0]  Yes   • Atrial flutter (HCC) [I48.92]  Yes   • Chronic systolic CHF (congestive heart failure) (HCC) [I50.22]  Yes   • Thrombus of venous dialysis catheter (HCC) [T82.868A]  Yes   • Anemia due to chronic kidney disease, on chronic dialysis (HCC) [N18.6, D63.1, Z99.2]  Not Applicable   • CAD (coronary artery  disease) [I25.10]  Yes   • ESRD (end stage renal disease) (HCC) [N18.6]  Yes   • Hypothyroidism [E03.9]  Yes   • Type 2 diabetes mellitus with kidney complication, with long-term current use of insulin (HCC) [E11.29, Z79.4]  Not Applicable      Resolved Hospital Problems   No resolved problems to display.       · Acute Pulmonary Edema: Volume removal with HD. Nephrology following.  · ESRD  · Atrial Flutter: Ventricular rate is ok currently. Not on any rate agents currently but was on metoprolol during last admission. IV prn. She is due for cardiology follow up. Will place consult. On chronic AC.  · Dialysis catheter thrombus: Extended into atrium on prior echo. PFO noted. On chronic AC for this with eliquis.  · A/C Systolic Heart Failure  · HTN: Multiple medications. Defer to nephrology.  · Hypothyroidism: on synthroid  · ACD: Epo noted.  · DM2: Continue insulin and will monitor requirements.  · PPx: AC as above  · Disposition: TBD    Alejo Weiss MD  [facility]  09/04/22  13:39 EDT    Dictated portions using Dragon dictation software.    During the entire encounter, I was wearing recommended PPE including face mask and eye protection. Hand sanitization was performed prior to entering room and upon exit.

## 2022-09-04 NOTE — OUTREACH NOTE
Medical Week 1 Survey    Flowsheet Row Responses   [facility] patient discharged from? [facility]   Does the patient have one of the following disease processes/diagnoses(primary or secondary)? Other   Week 1 attempt successful? No   Unsuccessful attempts Attempt 3   Revoke Readmitted          JOSELYN LUCERO - Registered Nurse

## 2022-09-04 NOTE — THERAPY EVALUATION
Patient Name: Zaina Martinez  : 1965    MRN: [id number]                              Today's Date: 2022       Admit Date: 9/3/2022    Visit Dx:     ICD-10-CM ICD-9-CM   1. Acute pulmonary edema ([facility])  J81.0 518.4   2. Hypoxia  R09.02 799.02   3. Hypervolemia, unspecified hypervolemia type  E87.70 276.69   4. ESRD on dialysis ([facility])  N18.6 585.6    Z99.2 V45.11   5. Chronic anemia  D64.9 285.9   6. Hyperglycemia  R73.9 790.29     Patient Active Problem List   Diagnosis   • Renal insufficiency   • Hypertensive disorder   • Hypothyroidism   • Type 2 diabetes mellitus with kidney complication, with long-term current use of insulin ([facility])   • Rheumatoid arthritis ([facility])   • Angioedema   • Esophageal dysmotility   • Anemia   • Medically noncompliant   • Myocardial infarction due to demand ischemia ([facility])   • Enteritis   • PRES (posterior reversible encephalopathy syndrome)   • Urine retention   • Klebsiella infection   • Superficial thrombophlebitis   • Generalized weakness   • ESRD (end stage renal disease) ([facility])   • CAD (coronary artery disease)   • Abnormal urinalysis   • Chronic diastolic CHF (congestive heart failure) ([facility])   • Pyelonephritis   • Calculus of gallbladder with acute on chronic cholecystitis without obstruction   • Pleural effusion on right   • Anemia due to chronic kidney disease, on chronic dialysis ([facility])   • Abnormal findings on diagnostic imaging of other specified body structures   • Acute upper respiratory infection   • Agitation   • Alkaline phosphatase raised   • Casts present in urine   • Cellulitis of toe   • Hip pain   • Community acquired pneumonia   • Depressive disorder   • Diarrhea of presumed infectious origin   • Difficult or painful urination   • Disease due to severe acute respiratory syndrome coronavirus 2 (SARS-CoV-2)   • Dyspnea   • Encounter for follow-up examination after completed treatment for conditions other than malignant neoplasm   • H/O: hypothyroidism   •  Hyperlipidemia   • Hypomagnesemia   • Intractable vomiting with nausea   • Leukocytosis   • Luetscher's syndrome   • Need for influenza vaccination   • Restless legs   • Noncompliance with treatment   • Shoulder pain   • Urinary tract infectious disease   • Metabolic encephalopathy   • Abnormal findings on diagnostic imaging of abdomen   • Status post cholecystectomy   • Hyponatremia   • Leukocytosis   • Acute metabolic encephalopathy   • Encephalopathy, toxic   • Acute CVA (cerebrovascular accident) (HCC)   • Intracranial hemorrhage (HCC)   • Stroke (HCC)   • Abnormal urinalysis   • Diabetic muscle infarction (HCC)   • Other insomnia   • Altered mental status   • History of stroke- R MCA s/p TPA with subsequent ICH with debility   • Heart murmur   • Bradycardia   • Left lower lobe pneumonia   • Metabolic encephalopathy   • Pericardial effusion   • Pleural effusion   • Acute pulmonary edema (HCC)     Past Medical History:   Diagnosis Date   • Acute CVA (cerebrovascular accident) (HCC) 5/1/2022   • Acute on chronic diastolic CHF (congestive heart failure) (HCC)    • CAD (coronary artery disease) 12/20/2021   • Diabetes (HCC)    • Disease of thyroid gland    • GERD (gastroesophageal reflux disease)    • Hyperlipidemia 11/30/2018   • Hypertension    • Rheumatoid arthritis (HCC)      Past Surgical History:   Procedure Laterality Date   • CHOLECYSTECTOMY WITH INTRAOPERATIVE CHOLANGIOGRAM N/A 1/10/2022    Procedure: Laparoscopic cholecystectomy with intraoperative cholangiogram;  Surgeon: Ramana Raygoza MD;  Location: [facility];  Service: General;  Laterality: N/A;   • EYE SURGERY     • HYSTERECTOMY     • INSERTION HEMODIALYSIS CATHETER N/A 12/6/2021    Procedure: HEMODIALYSIS CATHETER INSERTION;  Surgeon: Keli Salazar MD;  Location: [facility] 18/19;  Service: Vascular;  Laterality: N/A;   • INSERTION HEMODIALYSIS CATHETER N/A 5/3/2022    Procedure: TUNNELED CATHETER PLACEMENT;  Surgeon: Martin  Keli Desai MD;  Location: [facility] MAIN OR;  Service: Vascular;  Laterality: N/A;   • MUSCLE BIOPSY Left 6/15/2022    Procedure: Left quadriceps muscle biopsy;  Surgeon: Marques Ma MD;  Location: [facility] MAIN OR;  Service: Neurosurgery;  Laterality: Left;      General Information     Row Name 09/04/22 1136          Physical Therapy Time and Intention    Document Type evaluation  Pt. admitted with c/o SOA  -MS     Mode of Treatment physical therapy;individual therapy  -MS     Row Name 09/04/22 1136          General Information    Patient Profile Reviewed yes  -MS     Prior Level of Function independent:  Use of Rwx prior to admission  -MS     Existing Precautions/Restrictions fall   Exit alarm  -MS     Barriers to Rehab none identified  -MS     Row Name 09/04/22 1136          Cognition    Orientation Status (Cognition) oriented x 3  -MS     Row Name 09/04/22 1136          Safety Issues, Functional Mobility    Comment, Safety Issues/Impairments (Mobility) Gait belt used for safety.  -MS           User Key  (r) = Recorded By, (t) = Taken By, (c) = Cosigned By    Initials Name Provider Type    MS BaptisteMat manriquez, PT Physical Therapist               Mobility     Row Name 09/04/22 1136          Bed Mobility    Bed Mobility supine-sit;sit-supine  -MS     Supine-Sit [place] (Bed Mobility) contact guard  -MS     Sit-Supine [place] (Bed Mobility) contact guard  -MS     Row Name 09/04/22 1136          Sit-Stand Transfer    Sit-Stand [place] (Transfers) contact guard  -MS     Assistive Device (Sit-Stand Transfers) walker, front-wheeled  -MS     Row Name 09/04/22 1136          Gait/Stairs (Locomotion)    [place] Level (Gait) contact guard  -MS     Assistive Device (Gait) walker, front-wheeled  -MS     Distance in Feet (Gait) 25 feet  -MS     Deviations/Abnormal Patterns (Gait) kenn decreased  -MS     Bilateral Gait Deviations forward flexed posture  -MS     Comment, (Gait/Stairs) Verbal/tactile cues  for posture correction.  -MS           User Key  (r) = Recorded By, (t) = Taken By, (c) = Cosigned By    Initials Name Provider Type    MS Baptiste Mat NAVI, PT Physical Therapist               Obj/Interventions     Row Name 09/04/22 1137          Range of Motion Comprehensive    Comment, General Range of Motion BUE/LE (WFL's)  -MS     Row Name 09/04/22 1137          Strength Comprehensive (MMT)    Comment, General Manual Muscle Testing (MMT) Assessment BUE/LE (3+/5)  -MS     Row Name 09/04/22 1137          Motor Skills    Therapeutic Exercise --  BLE ther. ex. program x 5 reps completed (Ankle pumps, Hip Flexion, LAQ's)  -MS           User Key  (r) = Recorded By, (t) = Taken By, (c) = Cosigned By    Initials Name Provider Type    Mat Cid NAVI, PT Physical Therapist               Goals/Plan     Row Name 09/04/22 1138          Bed Mobility Goal 1 (PT)    Activity/Assistive Device (Bed Mobility Goal 1, PT) bed mobility activities, all  -MS     [place] Level/Cues Needed (Bed Mobility Goal 1, PT) independent  -MS     Time Frame (Bed Mobility Goal 1, PT) long term goal (LTG);1 week  -MS     Row Name 09/04/22 1138          Transfer Goal 1 (PT)    Activity/Assistive Device (Transfer Goal 1, PT) transfers, all;walker, rolling  -MS     [place] Level/Cues Needed (Transfer Goal 1, PT) independent  -MS     Time Frame (Transfer Goal 1, PT) long term goal (LTG);1 week  -MS     Row Name 09/04/22 1138          Gait Training Goal 1 (PT)    Activity/Assistive Device (Gait Training Goal 1, PT) gait (walking locomotion);walker, rolling  -MS     [place] Level (Gait Training Goal 1, PT) independent  -MS     Distance (Gait Training Goal 1, PT) 120 feet  -MS     Time Frame (Gait Training Goal 1, PT) long term goal (LTG);1 week  -MS     Row Name 09/04/22 1138          Therapy Assessment/Plan (PT)    Planned Therapy Interventions (PT) balance training;bed mobility training;gait training;home exercise  program;patient/family education;postural re-education;transfer training;strengthening  -MS           User Key  (r) = Recorded By, (t) = Taken By, (c) = Cosigned By    Initials Name Provider Type    Mat Cid, PT Physical Therapist               Clinical Impression     Row Name 09/04/22 1137          Pain    Pretreatment Pain Rating 0/10 - no pain  -MS     Posttreatment Pain Rating 0/10 - no pain  -MS     Row Name 09/04/22 1137          Plan of Care Review    Plan of Care Reviewed With patient  -MS     Row Name 09/04/22 1137          Therapy Assessment/Plan (PT)    Rehab Potential (PT) good, to achieve stated therapy goals  -MS     Criteria for Skilled Interventions Met (PT) skilled treatment is necessary  -MS     Therapy Frequency (PT) 6 times/wk  -MS     Row Name 09/04/22 1137          Positioning and Restraints    Pre-Treatment Position in bed  -MS     Post Treatment Position bed  -MS     In Bed notified nsg;supine;call light within reach;encouraged to call for assist;exit alarm on  All lines intact.  -MS           User Key  (r) = Recorded By, (t) = Taken By, (c) = Cosigned By    Initials Name Provider Type    Mat Cid, PT Physical Therapist               Outcome Measures     Row Name 09/04/22 1138          How much help from another person do you currently need...    Turning from your back to your side while in flat bed without using bedrails? 3  -MS     Moving from lying on back to sitting on the side of a flat bed without bedrails? 3  -MS     Moving to and from a bed to a chair (including a wheelchair)? 3  -MS     Standing up from a chair using your arms (e.g., wheelchair, bedside chair)? 3  -MS     Climbing 3-5 steps with a railing? 3  -MS     To walk in hospital room? 3  -MS     AM-PAC 6 Clicks Score (PT) 18  -MS     Highest level of mobility 6 --> Walked 10 steps or more  -MS     Row Name 09/04/22 1138          Functional Assessment    Outcome Measure Options AM-PAC 6 Clicks Basic  Mobility (PT)  -MS           User Key  (r) = Recorded By, (t) = Taken By, (c) = Cosigned By    Initials Name Provider Type    MS Mat Baptiste, PT Physical Therapist                             Physical Therapy Education                 Title: PT OT SLP Therapies (Done)     Topic: Physical Therapy (Done)     Point: Mobility training (Done)     Learning Progress Summary           Patient Acceptance, E,D, VU,NR by MS at 9/4/2022 1139                   Point: Home exercise program (Done)     Learning Progress Summary           Patient Acceptance, E,D, VU,NR by MS at 9/4/2022 1139                   Point: Body mechanics (Done)     Learning Progress Summary           Patient Acceptance, E,D, VU,NR by MS at 9/4/2022 1139                   Point: Precautions (Done)     Learning Progress Summary           Patient Acceptance, E,D, VU,NR by MS at 9/4/2022 1139                               User Key     Initials Effective Dates Name Provider Type Discipline    MS 06/16/21 -  Mat Baptiste PT Physical Therapist PT              PT Recommendation and Plan  Planned Therapy Interventions (PT): balance training, bed mobility training, gait training, home exercise program, patient/family education, postural re-education, transfer training, strengthening  Plan of Care Reviewed With: patient  Outcome Evaluation: Pt. is a 56 year old Female admitted to the hospital with c/o SOA.  Pt. reports that prior to admission she was independent with functional mobility and used a Rwx for ambulation.  Pt. currently presents with decreased strength, decreased balance, and decreased tolerance to functional activity. This AM, pt. able to ambulate 25 feet, CGA x 1, with use of Rwx.  Pt. requires CGA x 1 for bed mobility and CGA x 1 for sit <-> stand transfers. BLE ther. ex. program x 5 reps completed for general strengthening.  Pt. will benefit from skilled inpt. P.T. to address her functional deficits and to assist pt. in regaining her  maximum level of independence with functional mobility.     Time Calculation:    PT Charges     Row Name 09/04/22 1142             Time Calculation    Start Time 1000  -MS      Stop Time 1015  -MS      Time Calculation (min) 15 min  -MS      PT Received On 09/04/22  -MS      PT - Next Appointment 09/06/22  -MS      PT Goal Re-Cert Due Date 09/11/22  -MS              Time Calculation- PT    Total Timed Code Minutes- PT 14 minute(s)  -MS            User Key  (r) = Recorded By, (t) = Taken By, (c) = Cosigned By    Initials Name Provider Type    Mat Cid, PT Physical Therapist              Therapy Charges for Today     Code Description Service Date Service Provider Modifiers Qty    57886017164 HC PT EVAL MOD COMPLEXITY 2 9/4/2022 Mat Batpiste, PT GP 1    79514539502 HC PT THERAPEUTIC ACT EA 15 MIN 9/4/2022 Mat Baptiste, PT GP 1          PT G-Codes  Outcome Measure Options: AM-PAC 6 Clicks Basic Mobility (PT)  AM-PAC 6 Clicks Score (PT): 18    Mat Baptiste, PT  9/4/2022

## 2022-09-05 LAB
ALBUMIN SERPL-MCNC: 2.9 G/DL (ref 3.5–5.2)
ANION GAP SERPL CALCULATED.3IONS-SCNC: 11.1 MMOL/L (ref 5–15)
BUN SERPL-MCNC: 21 MG/DL (ref 6–20)
BUN/CREAT SERPL: 5.9 (ref 7–25)
CALCIUM SPEC-SCNC: 7.8 MG/DL (ref 8.6–10.5)
CHLORIDE SERPL-SCNC: 96 MMOL/L (ref 98–107)
CO2 SERPL-SCNC: 26.9 MMOL/L (ref 22–29)
CREAT SERPL-MCNC: 3.53 MG/DL (ref 0.57–1)
DEPRECATED RDW RBC AUTO: 49.4 FL (ref 37–54)
EGFRCR SERPLBLD CKD-EPI 2021: 14.6 ML/MIN/1.73
ERYTHROCYTE [DISTWIDTH] IN BLOOD BY AUTOMATED COUNT: 16.1 % (ref 12.3–15.4)
GLUCOSE BLDC GLUCOMTR-MCNC: 108 MG/DL (ref 70–130)
GLUCOSE BLDC GLUCOMTR-MCNC: 175 MG/DL (ref 70–130)
GLUCOSE BLDC GLUCOMTR-MCNC: 49 MG/DL (ref 70–130)
GLUCOSE BLDC GLUCOMTR-MCNC: 64 MG/DL (ref 70–130)
GLUCOSE BLDC GLUCOMTR-MCNC: 89 MG/DL (ref 70–130)
GLUCOSE SERPL-MCNC: 46 MG/DL (ref 65–99)
HCT VFR BLD AUTO: 29.2 % (ref 34–46.6)
HGB BLD-MCNC: 9.2 G/DL (ref 12–15.9)
MAGNESIUM SERPL-MCNC: 1.8 MG/DL (ref 1.6–2.6)
MCH RBC QN AUTO: 26.9 PG (ref 26.6–33)
MCHC RBC AUTO-ENTMCNC: 31.5 G/DL (ref 31.5–35.7)
MCV RBC AUTO: 85.4 FL (ref 79–97)
PHOSPHATE SERPL-MCNC: 4.2 MG/DL (ref 2.5–4.5)
PLATELET # BLD AUTO: 183 10*3/MM3 (ref 140–450)
PMV BLD AUTO: 10.9 FL (ref 6–12)
POTASSIUM SERPL-SCNC: 5.1 MMOL/L (ref 3.5–5.2)
RBC # BLD AUTO: 3.42 10*6/MM3 (ref 3.77–5.28)
SODIUM SERPL-SCNC: 134 MMOL/L (ref 136–145)
WBC NRBC COR # BLD: 8.43 10*3/MM3 (ref 3.4–10.8)

## 2022-09-05 PROCEDURE — 85027 COMPLETE CBC AUTOMATED: CPT | Performed by: INTERNAL MEDICINE

## 2022-09-05 PROCEDURE — 63710000001 ONDANSETRON PER 8 MG: Performed by: INTERNAL MEDICINE

## 2022-09-05 PROCEDURE — G0378 HOSPITAL OBSERVATION PER HR: HCPCS

## 2022-09-05 PROCEDURE — 25010000002 HEPARIN (PORCINE) PER 1000 UNITS: Performed by: INTERNAL MEDICINE

## 2022-09-05 PROCEDURE — 80069 RENAL FUNCTION PANEL: CPT | Performed by: INTERNAL MEDICINE

## 2022-09-05 PROCEDURE — 82962 GLUCOSE BLOOD TEST: CPT

## 2022-09-05 PROCEDURE — 83735 ASSAY OF MAGNESIUM: CPT | Performed by: INTERNAL MEDICINE

## 2022-09-05 PROCEDURE — 25010000002 EPOETIN ALFA-EPBX 10000 UNIT/ML SOLUTION: Performed by: INTERNAL MEDICINE

## 2022-09-05 RX ORDER — HYDRALAZINE HYDROCHLORIDE 25 MG/1
25 TABLET, FILM COATED ORAL EVERY 8 HOURS SCHEDULED
Status: DISCONTINUED | OUTPATIENT
Start: 2022-09-05 | End: 2022-09-08

## 2022-09-05 RX ADMIN — LEVOTHYROXINE SODIUM 200 MCG: 0.1 TABLET ORAL at 05:05

## 2022-09-05 RX ADMIN — MICONAZOLE NITRATE 1 APPLICATION: 20 CREAM TOPICAL at 20:23

## 2022-09-05 RX ADMIN — ASPIRIN 81 MG: 81 TABLET, COATED ORAL at 08:56

## 2022-09-05 RX ADMIN — APIXABAN 5 MG: 5 TABLET, FILM COATED ORAL at 08:56

## 2022-09-05 RX ADMIN — PANTOPRAZOLE SODIUM 40 MG: 40 TABLET, DELAYED RELEASE ORAL at 08:57

## 2022-09-05 RX ADMIN — EPOETIN ALFA-EPBX 10000 UNITS: 10000 INJECTION, SOLUTION INTRAVENOUS; SUBCUTANEOUS at 21:05

## 2022-09-05 RX ADMIN — SERTRALINE 150 MG: 100 TABLET, FILM COATED ORAL at 08:57

## 2022-09-05 RX ADMIN — HYDRALAZINE HYDROCHLORIDE 25 MG: 50 TABLET, FILM COATED ORAL at 20:20

## 2022-09-05 RX ADMIN — LOSARTAN POTASSIUM 100 MG: 100 TABLET, FILM COATED ORAL at 08:57

## 2022-09-05 RX ADMIN — HEPARIN SODIUM 3800 UNITS: 1000 INJECTION INTRAVENOUS; SUBCUTANEOUS at 17:25

## 2022-09-05 RX ADMIN — Medication 1 MG: at 08:57

## 2022-09-05 RX ADMIN — HYDRALAZINE HYDROCHLORIDE 100 MG: 50 TABLET, FILM COATED ORAL at 05:05

## 2022-09-05 RX ADMIN — ONDANSETRON HYDROCHLORIDE 4 MG: 4 TABLET, FILM COATED ORAL at 01:57

## 2022-09-05 RX ADMIN — ROPINIROLE HYDROCHLORIDE 0.75 MG: 0.5 TABLET, FILM COATED ORAL at 20:20

## 2022-09-05 RX ADMIN — CLONIDINE HYDROCHLORIDE 0.1 MG: 0.1 TABLET ORAL at 05:05

## 2022-09-05 RX ADMIN — MICONAZOLE NITRATE 1 APPLICATION: 20 CREAM TOPICAL at 08:57

## 2022-09-05 RX ADMIN — SODIUM ZIRCONIUM CYCLOSILICATE 5 G: 5 POWDER, FOR SUSPENSION ORAL at 08:58

## 2022-09-05 RX ADMIN — APIXABAN 5 MG: 5 TABLET, FILM COATED ORAL at 20:20

## 2022-09-05 NOTE — PROGRESS NOTES
Name: Zaina Martinez ADMIT: 9/3/2022   : 1965  PCP: System, Provider Not In    MRN: [id number] LOS: 0 days   AGE/SEX: 56 y.o. female  ROOM: [facility]   Subjective   Chief Complaint   Patient presents with   • Shortness of Breath      Dyspnea is improving. Was on 2L and saturating well. I decreased to 1L and she did not desaturate during exam.    No productive cough. No CP. No NVD.    Objective   Vital Signs  Temp:  [97.4 °F (36.3 °C)-98.2 °F (36.8 °C)] 97.8 °F (36.6 °C)  Heart Rate:  [80-95] 95  Resp:  [18] 18  BP: (122-141)/(71-85) 123/85  SpO2:  [92 %-95 %] 95 %  on  Flow (L/min):  [2-4] 2;   Device (Oxygen Therapy): nasal cannula  Body mass index is 23.55 kg/m².    Physical Exam  Vitals and nursing note reviewed.   Constitutional:       Appearance: She is ill-appearing (chronically). She is not diaphoretic.   HENT:      Head: Normocephalic and atraumatic.   Eyes:      General:         Right eye: No discharge.         Left eye: No discharge.      Conjunctiva/sclera: Conjunctivae normal.   Cardiovascular:      Rate and Rhythm: Normal rate. Rhythm irregular.   Pulmonary:      Effort: Pulmonary effort is normal.      Breath sounds: Decreased breath sounds present. No wheezing.   Abdominal:      General: There is no distension.      Palpations: Abdomen is soft.      Tenderness: There is no abdominal tenderness. There is no guarding or rebound.   Musculoskeletal:         General: No swelling or tenderness.      Cervical back: Neck supple. No tenderness.   Skin:     General: Skin is warm and dry.   Neurological:      Mental Status: She is alert. Mental status is at baseline.   Psychiatric:         Mood and Affect: Mood normal.         Behavior: Behavior normal.         Results Review:       I reviewed the patient's new clinical results.    Results from last 7 days   Lab Units 22  0612 22  0616 22  1357   WBC 10*3/mm3 8.43 8.51 10.23   HEMOGLOBIN g/dL 9.2* 9.5* 10.3*   PLATELETS 10*3/mm3 183 170  199     Results from last 7 days   Lab Units 09/05/22  0612 09/04/22  0616 09/03/22  1357   SODIUM mmol/L 134* 135* 135*   POTASSIUM mmol/L 5.1 4.7 4.7   CHLORIDE mmol/L 96* 97* 96*   CO2 mmol/L 26.9 27.7 26.3   BUN mg/dL 21* 16 12   CREATININE mg/dL 3.53* 2.95* 2.49*   GLUCOSE mg/dL 46* 239* 324*   Estimated Creatinine Clearance: 16.4 mL/min (A) (by C-G formula based on SCr of 3.53 mg/dL (H)).  Results from last 7 days   Lab Units 09/05/22  0612 09/04/22  0616 09/03/22  1357   CALCIUM mg/dL 7.8* 7.8* 8.2*   ALBUMIN g/dL 2.90* 2.90* 3.30*   MAGNESIUM mg/dL 1.8 1.8  --    PHOSPHORUS mg/dL 4.2 3.6 2.9          apixaban, 5 mg, Oral, Q12H  aspirin, 81 mg, Oral, Daily  cloNIDine, 0.1 mg, Oral, Q12H  epoetin brooke-epbx, 10,000 Units, Subcutaneous, Once per day on Mon Wed Fri  folic acid, 1 mg, Oral, Daily  gabapentin, 100 mg, Oral, Daily With Lunch  hydrALAZINE, 25 mg, Oral, Q8H  insulin lispro, 0-9 Units, Subcutaneous, TID With Meals  levothyroxine, 200 mcg, Oral, Q AM  losartan, 100 mg, Oral, Q24H  miconazole, 1 application, Topical, Q12H  pantoprazole, 40 mg, Oral, Daily  predniSONE, 5 mg, Oral, See Admin Instructions  rOPINIRole, 0.75 mg, Oral, Nightly  sertraline, 150 mg, Oral, Daily  sodium zirconium cyclosilicate, 5 g, Oral, Daily       Diet Regular; Cardiac    Assessment & Plan      Active Hospital Problems    Diagnosis  POA   • **Acute pulmonary edema (HCC) [J81.0]  Yes   • Atrial flutter (HCC) [I48.92]  Yes   • Chronic systolic CHF (congestive heart failure) (HCC) [I50.22]  Yes   • Thrombus of venous dialysis catheter (HCC) [T82.268A]  Yes   • Anemia due to chronic kidney disease, on chronic dialysis ([facility]) [N18.6, D63.1, Z99.2]  Not Applicable   • CAD (coronary artery disease) [I25.10]  Yes   • ESRD (end stage renal disease) ([facility]) [N18.6]  Yes   • Hypothyroidism [E03.9]  Yes   • Type 2 diabetes mellitus with kidney complication, with long-term current use of insulin ([facility]) [E11.29, Z79.4]  Not Applicable       Resolved Hospital Problems   No resolved problems to display.       · Acute Pulmonary Edema: Volume removal with HD. Wean O2 as tolerated. Nephrology following.  · ESRD  · Atrial Flutter: Ventricular rate is ok currently. Not on any rate agents. Chronic AC as below. Cardiology evaluated.  · Dialysis catheter thrombus: Extended into atrium on prior echo. PFO noted. On chronic AC for this with eliquis.  · A/C Systolic Heart Failure  · HTN: Multiple medications. Defer to nephrology.  · Hypothyroidism: on synthroid  · ACD: Epo noted.  · DM2: A1c 7.7. Hypoglycemia this morning. Hold long acting insulin. Monitor requirement with SSI.  · PPx: AC as above  · Disposition: TBD/1-2 days    Alejo Weiss MD  [facility]  09/05/22  12:44 EDT    Dictated portions using Dragon dictation software.    During the entire encounter, I was wearing recommended PPE including face mask and eye protection. Hand sanitization was performed prior to entering room and upon exit.

## 2022-09-05 NOTE — NURSING NOTE
HD completed. 1000ml removed. 1UPRBC transfused. BP was low before tx but got higher during Tx. No complications. Verbal report given. Post TX /61 HR 72

## 2022-09-05 NOTE — PLAN OF CARE
Goal Outcome Evaluation:  Plan of Care Reviewed With: patient        Progress: no change  Outcome Evaluation: Pt. A/OX4, VSS on O2@2L nc, no c/o  pain voiced, zofran given for c/o nausea and effective, BG was 50 @9pm, sandwich, chips, pepsi and appleausce given, rechecked BG 81, jello and ice cream given. safety measures in place will cont. to monitor

## 2022-09-05 NOTE — PROGRESS NOTES
[facility] Progress Note      Patient Name: Zaina Martinez  : 1965  MRN: [id number]  Primary Care Physician:  System, Provider Not In  Date of admission: 9/3/2022    Subjective     Interval History:   States that her breathing is the same as yesterday, but now on just 2 L/min  And examined on HD; stable blood pressures and no cramping  Appetite is poor; no N/V    Review of Systems:   As noted above    Objective     Vitals:   Temp:  [97.4 °F (36.3 °C)-97.9 °F (36.6 °C)] 97.8 °F (36.6 °C)  Heart Rate:  [] 100  Resp:  [16-18] 16  BP: (123-162)/() 162/110  Flow (L/min):  [2] 2    Intake/Output Summary (Last 24 hours) at 2022 1458  Last data filed at 2022 0325  Gross per 24 hour   Intake --   Output 50 ml   Net -50 ml       Physical Exam:    Constitutional: Awake, alert, chronically ill, slight periorbital edema  Qb 400, 148/98, HR 87, fluid removal 3.6 L  HEENT: Sclera anicteric, no conjunctival injection  Neck: Supple, no carotid bruit, trachea at midline, no JVD  Respiratory: Decreased BS in bases, L worse than R; not labored on 2 L/min  Cardiovascular: Irregularly irregular, tachycardic, 2/6M, no rub  Gastrointestinal: BS +, soft, nontender, +distended  Vascular: Right chest TDC  : No palpable bladder  Musculoskeletal:  Trace edema; +clubbing  Psychiatric: Flat affect, cooperative, fully oriented  Neurologic:  moving all extremities, normal speech and mental status  Skin: Warm and dry     Scheduled Meds:     apixaban, 5 mg, Oral, Q12H  aspirin, 81 mg, Oral, Daily  cloNIDine, 0.1 mg, Oral, Q12H  epoetin brooke-epbx, 10,000 Units, Subcutaneous, Once per day on   folic acid, 1 mg, Oral, Daily  gabapentin, 100 mg, Oral, Daily With Lunch  hydrALAZINE, 25 mg, Oral, Q8H  insulin lispro, 0-9 Units, Subcutaneous, TID With Meals  levothyroxine, 200 mcg, Oral, Q AM  losartan, 100 mg, Oral, Q24H  miconazole, 1 application, Topical, Q12H  pantoprazole, 40 mg,  Oral, Daily  predniSONE, 5 mg, Oral, See Admin Instructions  rOPINIRole, 0.75 mg, Oral, Nightly  sertraline, 150 mg, Oral, Daily  sodium zirconium cyclosilicate, 5 g, Oral, Daily      IV Meds:        Results Reviewed:   I have personally reviewed the results from the time of this admission to 9/5/2022 14:58 EDT     Results from last 7 days   Lab Units 09/05/22  0612 09/04/22  0616 09/03/22  1357   SODIUM mmol/L 134* 135* 135*   POTASSIUM mmol/L 5.1 4.7 4.7   CHLORIDE mmol/L 96* 97* 96*   CO2 mmol/L 26.9 27.7 26.3   BUN mg/dL 21* 16 12   CREATININE mg/dL 3.53* 2.95* 2.49*   CALCIUM mg/dL 7.8* 7.8* 8.2*   GLUCOSE mg/dL 46* 239* 324*       Estimated Creatinine Clearance: 16.4 mL/min (A) (by C-G formula based on SCr of 3.53 mg/dL (H)).    Results from last 7 days   Lab Units 09/05/22  0612 09/04/22  0616 09/03/22  1357   MAGNESIUM mg/dL 1.8 1.8  --    PHOSPHORUS mg/dL 4.2 3.6 2.9             Results from last 7 days   Lab Units 09/05/22  0612 09/04/22  0616 09/03/22  1357   WBC 10*3/mm3 8.43 8.51 10.23   HEMOGLOBIN g/dL 9.2* 9.5* 10.3*   PLATELETS 10*3/mm3 183 170 199             Assessment / Plan     ASSESSMENT:  1.  ESRD: volume excess by exam and imaging; stable potassium; dilutional hyponatremia; normal anion gap.  HD underway now  2.  Bilateral pleural effusions; also has chronic small pericardial effusion, with echo late last month negative for tamponade  3.  Atrial fibrillation/flutter  4.  Hypertension of ESRD, exacerbated by volume overload  5.  History of noncompliance with fluid restriction and medications in general  6.  Diabetes mellitus with renal complications; uncontrolled  7.  Anemia of ESRD: Hemoglobin at goal  8.  Dialysis catheter thrombus; has known PFO.  On chronic AC    PLAN:  1.  HD today (MWF schedule usually) with continued challenge to dry weight  2.  Surveillance labs    Thank you for involving us in the care of Zaina Martinez.  Please feel free to call with any questions.    Alejo Beth  MD Nazario  09/05/22  14:58 EDT    [facility]  [phone]      Much of this encounter note is an electronic transcription/translation of spoken language to printed text. The electronic translation of spoken language may permit erroneous, or at times, nonsensical words or phrases to be inadvertently transcribed; Although I have reviewed the note for such errors, some may still exist.

## 2022-09-06 LAB
ALBUMIN SERPL-MCNC: 3.3 G/DL (ref 3.5–5.2)
ANION GAP SERPL CALCULATED.3IONS-SCNC: 9 MMOL/L (ref 5–15)
BUN SERPL-MCNC: 13 MG/DL (ref 6–20)
BUN/CREAT SERPL: 5 (ref 7–25)
CALCIUM SPEC-SCNC: 7.7 MG/DL (ref 8.6–10.5)
CHLORIDE SERPL-SCNC: 98 MMOL/L (ref 98–107)
CO2 SERPL-SCNC: 27 MMOL/L (ref 22–29)
CREAT SERPL-MCNC: 2.6 MG/DL (ref 0.57–1)
EGFRCR SERPLBLD CKD-EPI 2021: 21.1 ML/MIN/1.73
GLUCOSE BLDC GLUCOMTR-MCNC: 127 MG/DL (ref 70–130)
GLUCOSE BLDC GLUCOMTR-MCNC: 175 MG/DL (ref 70–130)
GLUCOSE BLDC GLUCOMTR-MCNC: 187 MG/DL (ref 70–130)
GLUCOSE BLDC GLUCOMTR-MCNC: 254 MG/DL (ref 70–130)
GLUCOSE SERPL-MCNC: 141 MG/DL (ref 65–99)
MAGNESIUM SERPL-MCNC: 1.9 MG/DL (ref 1.6–2.6)
PHOSPHATE SERPL-MCNC: 3.2 MG/DL (ref 2.5–4.5)
POTASSIUM SERPL-SCNC: 4.6 MMOL/L (ref 3.5–5.2)
SODIUM SERPL-SCNC: 134 MMOL/L (ref 136–145)

## 2022-09-06 PROCEDURE — 97166 OT EVAL MOD COMPLEX 45 MIN: CPT | Performed by: OCCUPATIONAL THERAPIST

## 2022-09-06 PROCEDURE — G0378 HOSPITAL OBSERVATION PER HR: HCPCS

## 2022-09-06 PROCEDURE — 99214 OFFICE O/P EST MOD 30 MIN: CPT | Performed by: INTERNAL MEDICINE

## 2022-09-06 PROCEDURE — 97535 SELF CARE MNGMENT TRAINING: CPT | Performed by: OCCUPATIONAL THERAPIST

## 2022-09-06 PROCEDURE — 84443 ASSAY THYROID STIM HORMONE: CPT | Performed by: INTERNAL MEDICINE

## 2022-09-06 PROCEDURE — 83735 ASSAY OF MAGNESIUM: CPT | Performed by: INTERNAL MEDICINE

## 2022-09-06 PROCEDURE — 80069 RENAL FUNCTION PANEL: CPT | Performed by: INTERNAL MEDICINE

## 2022-09-06 PROCEDURE — 82962 GLUCOSE BLOOD TEST: CPT

## 2022-09-06 PROCEDURE — 97110 THERAPEUTIC EXERCISES: CPT

## 2022-09-06 PROCEDURE — 97110 THERAPEUTIC EXERCISES: CPT | Performed by: OCCUPATIONAL THERAPIST

## 2022-09-06 PROCEDURE — 63710000001 INSULIN LISPRO (HUMAN) PER 5 UNITS: Performed by: INTERNAL MEDICINE

## 2022-09-06 RX ORDER — METOPROLOL TARTRATE 50 MG/1
50 TABLET, FILM COATED ORAL EVERY 12 HOURS SCHEDULED
Status: DISCONTINUED | OUTPATIENT
Start: 2022-09-06 | End: 2022-09-06

## 2022-09-06 RX ORDER — METOPROLOL TARTRATE 50 MG/1
50 TABLET, FILM COATED ORAL EVERY 12 HOURS SCHEDULED
Status: DISCONTINUED | OUTPATIENT
Start: 2022-09-06 | End: 2022-09-08

## 2022-09-06 RX ADMIN — PANTOPRAZOLE SODIUM 40 MG: 40 TABLET, DELAYED RELEASE ORAL at 08:41

## 2022-09-06 RX ADMIN — APIXABAN 5 MG: 5 TABLET, FILM COATED ORAL at 21:18

## 2022-09-06 RX ADMIN — OXYCODONE 5 MG: 5 TABLET ORAL at 23:39

## 2022-09-06 RX ADMIN — Medication 1 MG: at 08:41

## 2022-09-06 RX ADMIN — INSULIN LISPRO 6 UNITS: 100 INJECTION, SOLUTION INTRAVENOUS; SUBCUTANEOUS at 13:12

## 2022-09-06 RX ADMIN — INSULIN LISPRO 2 UNITS: 100 INJECTION, SOLUTION INTRAVENOUS; SUBCUTANEOUS at 21:19

## 2022-09-06 RX ADMIN — GABAPENTIN 100 MG: 100 CAPSULE ORAL at 13:12

## 2022-09-06 RX ADMIN — CLONIDINE HYDROCHLORIDE 0.1 MG: 0.1 TABLET ORAL at 03:08

## 2022-09-06 RX ADMIN — SODIUM ZIRCONIUM CYCLOSILICATE 5 G: 5 POWDER, FOR SUSPENSION ORAL at 08:41

## 2022-09-06 RX ADMIN — Medication 3 MG: at 21:23

## 2022-09-06 RX ADMIN — ASPIRIN 81 MG: 81 TABLET, COATED ORAL at 08:41

## 2022-09-06 RX ADMIN — LEVOTHYROXINE SODIUM 200 MCG: 0.1 TABLET ORAL at 05:34

## 2022-09-06 RX ADMIN — SERTRALINE 150 MG: 100 TABLET, FILM COATED ORAL at 08:41

## 2022-09-06 RX ADMIN — HYDRALAZINE HYDROCHLORIDE 25 MG: 50 TABLET, FILM COATED ORAL at 21:15

## 2022-09-06 RX ADMIN — METOPROLOL TARTRATE 50 MG: 50 TABLET, FILM COATED ORAL at 15:21

## 2022-09-06 RX ADMIN — HYDRALAZINE HYDROCHLORIDE 25 MG: 50 TABLET, FILM COATED ORAL at 05:35

## 2022-09-06 RX ADMIN — CLONIDINE HYDROCHLORIDE 0.1 MG: 0.1 TABLET ORAL at 15:21

## 2022-09-06 RX ADMIN — LOSARTAN POTASSIUM 100 MG: 100 TABLET, FILM COATED ORAL at 08:41

## 2022-09-06 RX ADMIN — HYDRALAZINE HYDROCHLORIDE 25 MG: 50 TABLET, FILM COATED ORAL at 13:12

## 2022-09-06 RX ADMIN — MICONAZOLE NITRATE 1 APPLICATION: 20 CREAM TOPICAL at 08:42

## 2022-09-06 RX ADMIN — OXYCODONE 5 MG: 5 TABLET ORAL at 03:08

## 2022-09-06 RX ADMIN — APIXABAN 5 MG: 5 TABLET, FILM COATED ORAL at 08:41

## 2022-09-06 RX ADMIN — ROPINIROLE HYDROCHLORIDE 0.75 MG: 0.5 TABLET, FILM COATED ORAL at 21:18

## 2022-09-06 NOTE — THERAPY TREATMENT NOTE
Patient Name: Zaina Martinez  : 1965    MRN: [id number]                              Today's Date: 2022       Admit Date: 9/3/2022    Visit Dx:     ICD-10-CM ICD-9-CM   1. Acute pulmonary edema ([facility])  J81.0 518.4   2. Hypoxia  R09.02 799.02   3. Hypervolemia, unspecified hypervolemia type  E87.70 276.69   4. ESRD on dialysis ([facility])  N18.6 585.6    Z99.2 V45.11   5. Chronic anemia  D64.9 285.9   6. Hyperglycemia  R73.9 790.29     Patient Active Problem List   Diagnosis   • Renal insufficiency   • Hypertensive disorder   • Hypothyroidism   • Type 2 diabetes mellitus with kidney complication, with long-term current use of insulin ([facility])   • Rheumatoid arthritis ([facility])   • Angioedema   • Esophageal dysmotility   • Anemia   • Medically noncompliant   • Myocardial infarction due to demand ischemia ([facility])   • Enteritis   • PRES (posterior reversible encephalopathy syndrome)   • Urine retention   • Klebsiella infection   • Superficial thrombophlebitis   • Generalized weakness   • ESRD (end stage renal disease) ([facility])   • CAD (coronary artery disease)   • Abnormal urinalysis   • Chronic diastolic CHF (congestive heart failure) ([facility])   • Pyelonephritis   • Calculus of gallbladder with acute on chronic cholecystitis without obstruction   • Pleural effusion on right   • Anemia due to chronic kidney disease, on chronic dialysis ([facility])   • Abnormal findings on diagnostic imaging of other specified body structures   • Acute upper respiratory infection   • Agitation   • Alkaline phosphatase raised   • Casts present in urine   • Cellulitis of toe   • Hip pain   • Community acquired pneumonia   • Depressive disorder   • Diarrhea of presumed infectious origin   • Difficult or painful urination   • Disease due to severe acute respiratory syndrome coronavirus 2 (SARS-CoV-2)   • Dyspnea   • Encounter for follow-up examination after completed treatment for conditions other than malignant neoplasm   • H/O: hypothyroidism   •  Hyperlipidemia   • Hypomagnesemia   • Intractable vomiting with nausea   • Leukocytosis   • Luetscher's syndrome   • Need for influenza vaccination   • Restless legs   • Noncompliance with treatment   • Shoulder pain   • Urinary tract infectious disease   • Metabolic encephalopathy   • Abnormal findings on diagnostic imaging of abdomen   • Status post cholecystectomy   • Hyponatremia   • Leukocytosis   • Acute metabolic encephalopathy   • Encephalopathy, toxic   • Acute CVA (cerebrovascular accident) (HCC)   • Intracranial hemorrhage (HCC)   • Stroke (HCC)   • Abnormal urinalysis   • Diabetic muscle infarction (HCC)   • Other insomnia   • Altered mental status   • History of stroke- R MCA s/p TPA with subsequent ICH with debility   • Heart murmur   • Bradycardia   • Left lower lobe pneumonia   • Metabolic encephalopathy   • Pericardial effusion   • Pleural effusion   • Acute pulmonary edema (HCC)   • Atrial flutter (HCC)   • Chronic systolic CHF (congestive heart failure) (HCC)   • Thrombus of venous dialysis catheter (HCC)     Past Medical History:   Diagnosis Date   • Acute CVA (cerebrovascular accident) (HCC) 5/1/2022   • Acute on chronic diastolic CHF (congestive heart failure) (HCC)    • CAD (coronary artery disease) 12/20/2021   • Diabetes (HCC)    • Disease of thyroid gland    • GERD (gastroesophageal reflux disease)    • Hyperlipidemia 11/30/2018   • Hypertension    • Rheumatoid arthritis (HCC)      Past Surgical History:   Procedure Laterality Date   • CHOLECYSTECTOMY WITH INTRAOPERATIVE CHOLANGIOGRAM N/A 1/10/2022    Procedure: Laparoscopic cholecystectomy with intraoperative cholangiogram;  Surgeon: Ramana Raygoza MD;  Location: [facility];  Service: General;  Laterality: N/A;   • EYE SURGERY     • HYSTERECTOMY     • INSERTION HEMODIALYSIS CATHETER N/A 12/6/2021    Procedure: HEMODIALYSIS CATHETER INSERTION;  Surgeon: Keli Salazar MD;  Location: [facility] 18/19;  Service: Vascular;   Laterality: N/A;   • INSERTION HEMODIALYSIS CATHETER N/A 5/3/2022    Procedure: TUNNELED CATHETER PLACEMENT;  Surgeon: Keli Salazar MD;  Location: [facility] MAIN OR;  Service: Vascular;  Laterality: N/A;   • MUSCLE BIOPSY Left 6/15/2022    Procedure: Left quadriceps muscle biopsy;  Surgeon: Marques Ma MD;  Location: [facility] MAIN OR;  Service: Neurosurgery;  Laterality: Left;      General Information     Row Name 09/06/22 1509          Physical Therapy Time and Intention    Document Type therapy note (daily note) (P)   -TT     Mode of Treatment physical therapy (P)   -TT     Row Name 09/06/22 1509          General Information    Patient Profile Reviewed yes (P)   -TT           User Key  (r) = Recorded By, (t) = Taken By, (c) = Cosigned By    Initials Name Provider Type    TT Yvette Tee, PT Student PT Student               Mobility     Row Name 09/06/22 1509          Bed Mobility    Bed Mobility supine-sit (P)   -TT     Supine-Sit Pike (Bed Mobility) supervision (P)   -TT     Row Name 09/06/22 1509          Sit-Stand Transfer    Sit-Stand Pike (Transfers) contact guard;verbal cues (P)   -TT     Assistive Device (Sit-Stand Transfers) walker, front-wheeled (P)   -TT     Comment, (Sit-Stand Transfer) vc for hand placement (P)   -TT     Row Name 09/06/22 1509          Gait/Stairs (Locomotion)    Pike Level (Gait) contact guard;verbal cues (P)   -TT     Assistive Device (Gait) walker, front-wheeled (P)   -TT     Distance in Feet (Gait) 50 (P)   -TT     Deviations/Abnormal Patterns (Gait) base of support, narrow;scissoring;gait speed decreased (P)   -TT     Comment, (Gait/Stairs) vc for wider IJEOMA, single LOB episode with PT assist to correct (P)   -TT           User Key  (r) = Recorded By, (t) = Taken By, (c) = Cosigned By    Initials Name Provider Type    TT Yvette Tee PT Student PT Student               Obj/Interventions    No documentation.                Goals/Plan    No  documentation.                Clinical Impression     Row Name 09/06/22 1511          Pain    Pretreatment Pain Rating 0/10 - no pain (P)   -TT     Row Name 09/06/22 1511          Plan of Care Review    Plan of Care Reviewed With patient (P)   -TT     Progress improving (P)   -TT     Outcome Evaluation Pt recieved supine in bed and is agreeable to PT treatment at this time. Pt was SPV for supine > sit and CGA for STS. Pt ambulated 50ft with RW CGA with single LOB episode with PT assist for correction. Pt demonstrates increased activity tolerance with increased gait distance. Pt demonstrates scissoring gait pattern and narrow IJEOMA with BLE. Vc for increased IJEOMA provided to patient with limited carryover. Nsg notified of pt complaint of purewick leakage. Pt left in chair with alarm on and all needs in reach. (P)   -TT     Row Name 09/06/22 1511          Positioning and Restraints    Pre-Treatment Position in bed (P)   -TT     Post Treatment Position chair (P)   -TT     In Chair notified nsg;reclined;call light within reach;encouraged to call for assist;exit alarm on (P)   -TT           User Key  (r) = Recorded By, (t) = Taken By, (c) = Cosigned By    Initials Name Provider Type    TT Yvette Tee, PT Student PT Student               Outcome Measures     Row Name 09/06/22 1514          How much help from another person do you currently need...    Turning from your back to your side while in flat bed without using bedrails? 4 (P)   -TT     Moving from lying on back to sitting on the side of a flat bed without bedrails? 4 (P)   -TT     Moving to and from a bed to a chair (including a wheelchair)? 3 (P)   -TT     Standing up from a chair using your arms (e.g., wheelchair, bedside chair)? 3 (P)   -TT     Climbing 3-5 steps with a railing? 3 (P)   -TT     To walk in hospital room? 3 (P)   -TT     AM-PAC 6 Clicks Score (PT) 20 (P)   -TT     Highest level of mobility 6 --> Walked 10 steps or more (P)   -TT     Row Name  09/06/22 1314          Functional Assessment    Outcome Measure Options AM-PAC 6 Clicks Daily Activity (OT)  -KP           User Key  (r) = Recorded By, (t) = Taken By, (c) = Cosigned By    Initials Name Provider Type    Estefany Smith, OTR Occupational Therapist    TT Yvette Tee, PT Student PT Student                             Physical Therapy Education                 Title: PT OT SLP Therapies (Done)     Topic: Physical Therapy (Done)     Point: Mobility training (Done)     Learning Progress Summary           Patient Acceptance, E,TB, VU by TT at 9/6/2022 1514    Acceptance, E,D, VU,NR by MS at 9/4/2022 1139                   Point: Home exercise program (Done)     Learning Progress Summary           Patient Acceptance, E,D, VU,NR by MS at 9/4/2022 1139                   Point: Body mechanics (Done)     Learning Progress Summary           Patient Acceptance, E,D, VU,NR by MS at 9/4/2022 1139                   Point: Precautions (Done)     Learning Progress Summary           Patient Acceptance, E,D, VU,NR by MS at 9/4/2022 1139                               User Key     Initials Effective Dates Name Provider Type Discipline    MS 06/16/21 -  Mat Baptiste, PT Physical Therapist PT    TT 08/30/22 -  Yvette Tee, PT Student PT Student PT              PT Recommendation and Plan     Plan of Care Reviewed With: (P) patient  Progress: (P) improving  Outcome Evaluation: (P) Pt recieved supine in bed and is agreeable to PT treatment at this time. Pt was SPV for supine > sit and CGA for STS. Pt ambulated 50ft with RW CGA with single LOB episode with PT assist for correction. Pt demonstrates increased activity tolerance with increased gait distance. Pt demonstrates scissoring gait pattern and narrow IJEOMA with BLE. Vc for increased IJEOMA provided to patient with limited carryover. Nsg notified of pt complaint of purewick leakage. Pt left in chair with alarm on and all needs in reach.     Time Calculation:     PT Charges     Row Name 09/06/22 1515             Time Calculation    Start Time 1453 (P)   -TT      Stop Time 1507 (P)   -TT      Time Calculation (min) 14 min (P)   -TT      PT Received On 09/06/22 (P)   -TT      PT - Next Appointment 09/07/22 (P)   -TT              Time Calculation- PT    Total Timed Code Minutes- PT 14 minute(s) (P)   -TT              Timed Charges    88201 - PT Therapeutic Exercise Minutes 14 (P)   -TT      03348 - Gait Training Minutes  -- (P)   -TT              Total Minutes    Timed Charges Total Minutes 14 (P)   -TT       Total Minutes 14 (P)   -TT            User Key  (r) = Recorded By, (t) = Taken By, (c) = Cosigned By    Initials Name Provider Type    TT Yvette Tee, PT Student PT Student              Therapy Charges for Today     Code Description Service Date Service Provider Modifiers Qty    13912921249 HC PT THER PROC EA 15 MIN 9/6/2022 Yvette Tee PT Student GP 1          PT G-Codes  Outcome Measure Options: AM-PAC 6 Clicks Daily Activity (OT)  AM-PAC 6 Clicks Score (PT): (P) 20  AM-PAC 6 Clicks Score (OT): 18    Yvette Tee PT Student  9/6/2022

## 2022-09-06 NOTE — PLAN OF CARE
Goal Outcome Evaluation:  Plan of Care Reviewed With: patient        Progress: improving  Outcome Evaluation: pt evaluated for OT this date and was admitted from home w SOA. pt is well known to this therapist as she was previously on our rehab at [facility]. She reports she was in the hospital recently as well and home for a week. Now back in for SOA and is on O2 w sats in the mid to high 90s. This date pt completed bed mobility w SBA. sitting balance SBA. and stood w walker w CGA. pt completed socks w SBA and hair brushing w SBA to min A as she needed help to get tangles out in the back. pt plans to return home at [place] w family. pt cont to benefit from OT to incr ADL, strength, balance, and tsf.  OT wore all PPE, washed hands before/after.

## 2022-09-06 NOTE — PROGRESS NOTES
Name: Zaina Martinez ADMIT: 9/3/2022   : 1965  PCP: System, Provider Not In    MRN: [id number] LOS: 0 days   AGE/SEX: 56 y.o. female  [facility]   Subjective   Chief Complaint   Patient presents with   • Shortness of Breath      3L this morning. No CP NVD. Dyspnea with exertion but not having productive cough.    Objective   Vital Signs  Temp:  [97.8 °F (36.6 °C)-98.6 °F (37 °C)] 98.6 °F (37 °C)  Heart Rate:  [] 87  Resp:  [16-20] 20  BP: (151-172)/() 164/96  SpO2:  [90 %-99 %] 99 %  on  Flow (L/min):  [2] 2;   Device (Oxygen Therapy): nasal cannula  Body mass index is 22.38 kg/m².    Physical Exam  Vitals and nursing note reviewed.   Constitutional:       Appearance: She is ill-appearing (chronically). She is not diaphoretic.   HENT:      Head: Normocephalic and atraumatic.   Eyes:      General:         Right eye: No discharge.         Left eye: No discharge.      Conjunctiva/sclera: Conjunctivae normal.   Cardiovascular:      Rate and Rhythm: Normal rate. Rhythm irregular.   Pulmonary:      Effort: Pulmonary effort is normal.      Breath sounds: Decreased breath sounds present. No wheezing.   Abdominal:      General: There is no distension.      Palpations: Abdomen is soft.      Tenderness: There is no abdominal tenderness. There is no guarding or rebound.   Musculoskeletal:         General: No swelling or tenderness.      Cervical back: Neck supple. No tenderness.   Skin:     General: Skin is warm and dry.   Neurological:      Mental Status: She is alert. Mental status is at baseline.   Psychiatric:         Mood and Affect: Mood normal.         Behavior: Behavior normal.         Results Review:       I reviewed the patient's new clinical results.   I reviewed telemetry, geoffrey.    Results from last 7 days   Lab Units 22  0612 22  0616 22  1357   WBC 10*3/mm3 8.43 8.51 10.23   HEMOGLOBIN g/dL 9.2* 9.5* 10.3*   PLATELETS 10*3/mm3 183 170 199     Results from last 7 days    Lab Units 09/06/22  0535 09/05/22  0612 09/04/22  0616 09/03/22  1357   SODIUM mmol/L 134* 134* 135* 135*   POTASSIUM mmol/L 4.6 5.1 4.7 4.7   CHLORIDE mmol/L 98 96* 97* 96*   CO2 mmol/L 27.0 26.9 27.7 26.3   BUN mg/dL 13 21* 16 12   CREATININE mg/dL 2.60* 3.53* 2.95* 2.49*   GLUCOSE mg/dL 141* 46* 239* 324*   Estimated Creatinine Clearance: 21.2 mL/min (A) (by C-G formula based on SCr of 2.6 mg/dL (H)).  Results from last 7 days   Lab Units 09/06/22  0535 09/05/22  0612 09/04/22  0616 09/03/22  1357   CALCIUM mg/dL 7.7* 7.8* 7.8* 8.2*   ALBUMIN g/dL 3.30* 2.90* 2.90* 3.30*   MAGNESIUM mg/dL 1.9 1.8 1.8  --    PHOSPHORUS mg/dL 3.2 4.2 3.6 2.9          apixaban, 5 mg, Oral, Q12H  aspirin, 81 mg, Oral, Daily  cloNIDine, 0.1 mg, Oral, Q12H  epoetin brooke-epbx, 10,000 Units, Subcutaneous, Once per day on Mon Wed Fri  folic acid, 1 mg, Oral, Daily  gabapentin, 100 mg, Oral, Daily With Lunch  hydrALAZINE, 25 mg, Oral, Q8H  insulin lispro, 0-9 Units, Subcutaneous, TID With Meals  levothyroxine, 200 mcg, Oral, Q AM  losartan, 100 mg, Oral, Q24H  miconazole, 1 application, Topical, Q12H  pantoprazole, 40 mg, Oral, Daily  predniSONE, 5 mg, Oral, See Admin Instructions  rOPINIRole, 0.75 mg, Oral, Nightly  sertraline, 150 mg, Oral, Daily  sodium zirconium cyclosilicate, 5 g, Oral, Daily       Diet Regular; Cardiac    Assessment & Plan      Active Hospital Problems    Diagnosis  POA   • **Acute pulmonary edema (HCC) [J81.0]  Yes   • Atrial flutter (HCC) [I48.92]  Yes   • Chronic systolic CHF (congestive heart failure) ([facility]) [I50.22]  Yes   • Thrombus of venous dialysis catheter ([facility]) [T82.868A]  Yes   • Anemia due to chronic kidney disease, on chronic dialysis ([facility]) [N18.6, D63.1, Z99.2]  Not Applicable   • CAD (coronary artery disease) [I25.10]  Yes   • ESRD (end stage renal disease) ([facility]) [N18.6]  Yes   • Hypothyroidism [E03.9]  Yes   • Type 2 diabetes mellitus with kidney complication, with long-term current use of insulin  ([facility]) [E11.29, Z79.4]  Not Applicable      Resolved Hospital Problems   No resolved problems to display.       · Acute Pulmonary Edema: Volume removal with HD. Wean O2 as tolerated. Nephrology following.  · ESRD  · Atrial Flutter: Ventricular rate is ok currently. Not on any rate agents. Chronic AC as below. Cardiology evaluated.  · Dialysis catheter thrombus: Extended into atrium on prior echo. PFO noted. On chronic AC for this with eliquis.  · A/C Systolic Heart Failure  · HTN: Multiple medications. Defer to nephrology.  · Hypothyroidism: on synthroid  · ACD: Epo noted.  · DM2: A1c 7.7. Hypoglycemia this admit. Low dose SSI and monitor requirements.  · PPx: AC as above  · Disposition: TBD/1-2 days    Addendum: Flutter ventricular rate has increased today to 120s. Last admission she required metoprolol. Will resume 50mg BID and reconsult Cardiology. Discussed with Dr Lira.    Alejo Weiss MD  [facility]  09/06/22  12:42 EDT    Dictated portions using Dragon dictation software.    During the entire encounter, I was wearing recommended PPE including face mask and eye protection. Hand sanitization was performed prior to entering room and upon exit.

## 2022-09-06 NOTE — CASE MANAGEMENT/SOCIAL WORK
Discharge Planning Assessment  [facility]     Patient Name: Zaina Martinez  MRN: [id number]  Today's Date: 9/6/2022    Admit Date: 9/3/2022     Discharge Needs Assessment     Row Name 09/06/22 1402       Living Environment    People in Home grandchild(pro);spouse    Current Living Arrangements home    Primary Care Provided by self    Provides Primary Care For no one    Family Caregiver if Needed spouse;child(pro), adult    Able to Return to Prior Arrangements yes       Resource/Environmental Concerns    Resource/Environmental Concerns none       Transition Planning    Patient/Family Anticipates Transition to home with help/services    Patient/Family Anticipated Services at Transition home health care    Transportation Anticipated family or friend will provide       Discharge Needs Assessment    Equipment Currently Used at Home wheelchair;walker, rolling;ramp    Concerns to be Addressed discharge planning    Equipment Needed After Discharge none    Discharge Facility/Level of Care Needs home with home health               Discharge Plan     Row Name 09/06/22 1402       Plan    Plan Home w/ current [facility], family to transport    Patient/Family in Agreement with Plan yes    Plan Comments CSW spoke to patient and daughter at bedside; explained role, verified facesheet, and discussed dc plan. Plan is home w/ current [facility], family to transport. Patient uses a walker, wheelchair, and ramp at home. She lives with her spouse and grandkids in a 2 level home but resides on 1 level. There is a ramp to enter. Her PCP through Extended care house calls is Jane Kyle. She is current with [facility]. She has been to [facility], [facility] and [facility]. Patient gets HD at [facility]. Patient and family deny SNF at this time. Patient is requesting assistance with wheelchair she received last admission at [facility]. She states the wheelchair is too wide. CSW spoke to Anna/Tyra who reports she will reach out to the patient  and get it swapped out. CCP to follow for any further recommendations. JAVIER, CSW              Continued Care and Services - Admitted Since 9/3/2022     Durable Medical Equipment     Service Provider Request Status Selected Services Address Phone Fax Patient Preferred    [facility]  Pending - Request Sent N/A [address] [facility] [address] [phone] --            Selected Continued Care - Prior Encounters Includes selections from prior encounters from 6/5/2022 to 9/6/2022    Discharged on 8/27/2022 Admission date: 8/19/2022 - Discharge disposition: Home-Health Care Svc    Durable Medical Equipment     Service Provider Selected Services Address Phone Fax Patient Preferred    [facility]  Durable Medical Equipment [address] [place] [id number] [phone] --          Home Medical Care     Service Provider Selected Services Address Phone Fax Patient Preferred    [facility]  Home Health Services [facility] [address] [address] [phone] --                Discharged on 8/4/2022 Admission date: 7/27/2022 - Discharge disposition: Home-Health Care Svc    Dialysis/Infusion     Service Provider Selected Services Address Phone Fax Patient Preferred    [facility] [address]  [facility] [address] [facility] [address] [phone] --          Home Medical Care     Service Provider Selected Services Address Phone Fax Patient Preferred    [facility]  Home Health Services [address] [place] [address] [phone] --                    Expected Discharge Date and Time     Expected Discharge Date Expected Discharge Time    Sep 7, 2022          Demographic Summary     Row Name 09/06/22 1401       General Information    Admission Type observation    Arrived From home    Referral Source admission list    Reason for Consult discharge planning     Preferred Language English       Contact Information    Permission Granted to Share Info With family/designee               Functional Status     Row Name 09/06/22 1409       Functional Status    Usual Activity Tolerance fair    Current Activity Tolerance fair       Functional Status, IADL    Medications assistive equipment    Meal Preparation assistive equipment    Housekeeping assistive equipment    Laundry assistive equipment    Shopping assistive equipment       Mental Status    General Appearance WDL WDL               Psychosocial    No documentation.                Abuse/Neglect    No documentation.                Legal    No documentation.                Substance Abuse    No documentation.                Patient Forms    No documentation.                   MEGGAN ALVAREZ

## 2022-09-06 NOTE — THERAPY EVALUATION
Patient Name: Zaina Martinez  : 1965    MRN: [id number]                              Today's Date: 2022       Admit Date: 9/3/2022    Visit Dx:     ICD-10-CM ICD-9-CM   1. Acute pulmonary edema ([facility])  J81.0 518.4   2. Hypoxia  R09.02 799.02   3. Hypervolemia, unspecified hypervolemia type  E87.70 276.69   4. ESRD on dialysis ([facility])  N18.6 585.6    Z99.2 V45.11   5. Chronic anemia  D64.9 285.9   6. Hyperglycemia  R73.9 790.29     Patient Active Problem List   Diagnosis   • Renal insufficiency   • Hypertensive disorder   • Hypothyroidism   • Type 2 diabetes mellitus with kidney complication, with long-term current use of insulin ([facility])   • Rheumatoid arthritis ([facility])   • Angioedema   • Esophageal dysmotility   • Anemia   • Medically noncompliant   • Myocardial infarction due to demand ischemia ([facility])   • Enteritis   • PRES (posterior reversible encephalopathy syndrome)   • Urine retention   • Klebsiella infection   • Superficial thrombophlebitis   • Generalized weakness   • ESRD (end stage renal disease) ([facility])   • CAD (coronary artery disease)   • Abnormal urinalysis   • Chronic diastolic CHF (congestive heart failure) ([facility])   • Pyelonephritis   • Calculus of gallbladder with acute on chronic cholecystitis without obstruction   • Pleural effusion on right   • Anemia due to chronic kidney disease, on chronic dialysis ([facility])   • Abnormal findings on diagnostic imaging of other specified body structures   • Acute upper respiratory infection   • Agitation   • Alkaline phosphatase raised   • Casts present in urine   • Cellulitis of toe   • Hip pain   • Community acquired pneumonia   • Depressive disorder   • Diarrhea of presumed infectious origin   • Difficult or painful urination   • Disease due to severe acute respiratory syndrome coronavirus 2 (SARS-CoV-2)   • Dyspnea   • Encounter for follow-up examination after completed treatment for conditions other than malignant neoplasm   • H/O: hypothyroidism   •  Hyperlipidemia   • Hypomagnesemia   • Intractable vomiting with nausea   • Leukocytosis   • Luetscher's syndrome   • Need for influenza vaccination   • Restless legs   • Noncompliance with treatment   • Shoulder pain   • Urinary tract infectious disease   • Metabolic encephalopathy   • Abnormal findings on diagnostic imaging of abdomen   • Status post cholecystectomy   • Hyponatremia   • Leukocytosis   • Acute metabolic encephalopathy   • Encephalopathy, toxic   • Acute CVA (cerebrovascular accident) ([facility])   • Intracranial hemorrhage (HCC)   • Stroke (HCC)   • Abnormal urinalysis   • Diabetic muscle infarction (HCC)   • Other insomnia   • Altered mental status   • History of stroke- R MCA s/p TPA with subsequent ICH with debility   • Heart murmur   • Bradycardia   • Left lower lobe pneumonia   • Metabolic encephalopathy   • Pericardial effusion   • Pleural effusion   • Acute pulmonary edema (HCC)   • Atrial flutter (HCC)   • Chronic systolic CHF (congestive heart failure) (HCC)   • Thrombus of venous dialysis catheter (HCC)     Past Medical History:   Diagnosis Date   • Acute CVA (cerebrovascular accident) (HCC) 5/1/2022   • Acute on chronic diastolic CHF (congestive heart failure) (HCC)    • CAD (coronary artery disease) 12/20/2021   • Diabetes (HCC)    • Disease of thyroid gland    • GERD (gastroesophageal reflux disease)    • Hyperlipidemia 11/30/2018   • Hypertension    • Rheumatoid arthritis (HCC)      Past Surgical History:   Procedure Laterality Date   • CHOLECYSTECTOMY WITH INTRAOPERATIVE CHOLANGIOGRAM N/A 1/10/2022    Procedure: Laparoscopic cholecystectomy with intraoperative cholangiogram;  Surgeon: Ramana Raygoza MD;  Location: [facility];  Service: General;  Laterality: N/A;   • EYE SURGERY     • HYSTERECTOMY     • INSERTION HEMODIALYSIS CATHETER N/A 12/6/2021    Procedure: HEMODIALYSIS CATHETER INSERTION;  Surgeon: Keli Salazar MD;  Location: [facility] 18/19;  Service: Vascular;   Laterality: N/A;   • INSERTION HEMODIALYSIS CATHETER N/A 5/3/2022    Procedure: TUNNELED CATHETER PLACEMENT;  Surgeon: Keli Salazar MD;  Location: [facility] OR;  Service: Vascular;  Laterality: N/A;   • MUSCLE BIOPSY Left 6/15/2022    Procedure: Left quadriceps muscle biopsy;  Surgeon: Marques Ma MD;  Location: [facility] OR;  Service: Neurosurgery;  Laterality: Left;      General Information     Row Name 09/06/22 1302          OT Time and Intention    Document Type evaluation;therapy note (daily note)  -     Mode of Treatment individual therapy;occupational therapy  -     Row Name 09/06/22 1302          General Information    Patient Profile Reviewed yes  -     Prior Level of Function --  pt reports being home for a week using her walker and having some A. but did most of the ADLs on her own  -     Existing Precautions/Restrictions fall  -     Barriers to Rehab none identified  -     Row Name 09/06/22 1302          Living Environment    People in Home spouse;grandchild(pro)  -     Row Name 09/06/22 1302          Cognition    Orientation Status (Cognition) oriented x 4  -     Row Name 09/06/22 1302          Safety Issues, Functional Mobility    Impairments Affecting Function (Mobility) balance;endurance/activity tolerance;strength  -           User Key  (r) = Recorded By, (t) = Taken By, (c) = Cosigned By    Initials Name Provider Type     Estefany Diamond, OTR Occupational Therapist                 Mobility/ADL's     Row Name 09/06/22 1303          Bed Mobility    Bed Mobility supine-sit;sit-supine  -     Supine-Sit [place] (Bed Mobility) set up;standby assist  -     Sit-Supine [place] (Bed Mobility) set up;standby assist  -     Row Name 09/06/22 1303          Transfers    Transfers sit-stand transfer;stand-sit transfer  -     Sit-Stand [place] (Transfers) set up;contact guard  -     Stand-Sit [place] (Transfers) set up;contact guard  -      Row Name 09/06/22 1303          Sit-Stand Transfer    Assistive Device (Sit-Stand Transfers) walker, front-wheeled  -     Row Name 09/06/22 1303          Stand-Sit Transfer    Assistive Device (Stand-Sit Transfers) walker, front-wheeled  -     Row Name 09/06/22 1303          Functional Mobility    Functional Mobility- Comment walks a few steps in room then back to bed as O2 line is short and purewick in place.  -     Row Name 09/06/22 1303          Activities of Daily Living    BADL Assessment/Intervention lower body dressing;grooming  -[facility] Name 09/06/22 1303          Lower Body Dressing Assessment/Training    [place] Level (Lower Body Dressing) lower body dressing skills;don;socks;standby assist;set up  -     Position (Lower Body Dressing) edge of bed sitting  -[facility] Name 09/06/22 1303          Grooming Assessment/Training    [place] Level (Grooming) grooming skills;hair care, combing/brushing;set up;standby assist;minimum assist (75% patient effort)  -     Position (Grooming) edge of bed sitting  -     Comment, (Grooming) pt w some tangles in back of hair and wanting A to get tangles out.  -           User Key  (r) = Recorded By, (t) = Taken By, (c) = Cosigned By    Initials Name Provider Type     Estefany Diamond, OTR Occupational Therapist               Obj/Interventions     Row Name 09/06/22 1305          Sensory Assessment (Somatosensory)    Sensory Assessment (Somatosensory) sensation intact  -[facility] Name 09/06/22 1305          Range of Motion Comprehensive    General Range of Motion bilateral upper extremity ROM WNL  -     Comment, General Range of Motion B UE 8/8  -[facility] Name 09/06/22 1305          Strength Comprehensive (MMT)    General Manual Muscle Testing (MMT) Assessment upper extremity strength deficits identified  -     Comment, General Manual Muscle Testing (MMT) Assessment B UE 3+/5  -[facility] Name 09/06/22 1305          Shoulder  (Therapeutic Exercise)    Shoulder (Therapeutic Exercise) AROM (active range of motion)  -     Shoulder AROM (Therapeutic Exercise) bilateral;flexion;extension;scapular elevation;scapular protraction;10 repetitions;2 sets;sitting  -     Row Name 09/06/22 1305          Elbow/Forearm (Therapeutic Exercise)    Elbow/Forearm (Therapeutic Exercise) AROM (active range of motion)  -     Elbow/Forearm AROM (Therapeutic Exercise) bilateral;flexion;extension;supination;pronation;sitting;10 repetitions;2 sets  -     Row Name 09/06/22 1305          Motor Skills    Motor Skills coordination  -     Coordination WFL  -     Therapeutic Exercise elbow/forearm;shoulder  -[facility] Name 09/06/22 1305          Balance    Balance Assessment sitting static balance;standing static balance  -     Static Sitting Balance set-up;standby assist  -     Position, Sitting Balance sitting edge of bed  -     Static Standing Balance set-up;contact guard  -     Position/Device Used, Standing Balance walker, rolling  -     Balance Interventions sitting;standing;sit to stand;supported;static  -           User Key  (r) = Recorded By, (t) = Taken By, (c) = Cosigned By    Initials Name Provider Type     Estefany Diamond OTJANICE Occupational Therapist               Goals/Plan     Row Name 09/06/22 1313          Transfer Goal 1 (OT)    Activity/Assistive Device (Transfer Goal 1, OT) toilet;sit-to-stand/stand-to-sit;bed-to-chair/chair-to-bed  -     [place] Level/Cues Needed (Transfer Goal 1, OT) standby assist  -     Time Frame (Transfer Goal 1, OT) short term goal (STG);2 weeks  -     Progress/Outcome (Transfer Goal 1, OT) goal ongoing  -     Row Name 09/06/22 1313          Bathing Goal 1 (OT)    Activity/Device (Bathing Goal 1, OT) bathing skills, all  -     [place] Level/Cues Needed (Bathing Goal 1, OT) standby assist  -     Time Frame (Bathing Goal 1, OT) short term goal (STG);2 weeks  -      Progress/Outcomes (Bathing Goal 1, OT) goal ongoing  -     Row Name 09/06/22 1313          Strength Goal 1 (OT)    Strength Goal 1 (OT) incr UE to 4/5  -     Time Frame (Strength Goal 1, OT) short term goal (STG);2 weeks  -     Progress/Outcome (Strength Goal 1, OT) goal ongoing  -     Row Name 09/06/22 1313          Therapy Assessment/Plan (OT)    Planned Therapy Interventions (OT) activity tolerance training;functional balance retraining;occupation/activity based interventions;strengthening exercise;transfer/mobility retraining;BADL retraining  -           User Key  (r) = Recorded By, (t) = Taken By, (c) = Cosigned By    Initials Name Provider Type    Estefany Smith, OTR Occupational Therapist               Clinical Impression     Row Name 09/06/22 1309          Pain Assessment    Pretreatment Pain Rating 0/10 - no pain  -     Posttreatment Pain Rating 0/10 - no pain  -     Row Name 09/06/22 1309          Plan of Care Review    Plan of Care Reviewed With patient  -     Progress improving  -     Outcome Evaluation pt evaluated for OT this date and was admitted from home w SOA. pt is well known to this therapist as she was previously on our rehab at [facility]. She reports she was in the hospital recently as well and home for a week. Now back in for SOA and is on O2 w sats in the mid to high 90s. This date pt completed bed mobility w SBA. sitting balance SBA. and stood w walker w CGA. pt completed socks w SBA and hair brushing w SBA to min A as she needed help to get tangles out in the back. pt plans to return home at [place] w family. pt cont to benefit from OT to incr ADL, strength, balance, and tsf.  -     Row Name 09/06/22 1309          Therapy Assessment/Plan (OT)    Rehab Potential (OT) good, to achieve stated therapy goals  -     Criteria for Skilled Therapeutic Interventions Met (OT) yes;meets criteria;skilled treatment is necessary  -     Therapy Frequency (OT) 5 times/wk  -      Row Name 09/06/22 1309          Therapy Plan Review/Discharge Plan (OT)    Anticipated Discharge Disposition (OT) home with home health;home with assist  -KP     Row Name 09/06/22 1309          Vital Signs    Pre SpO2 (%) 98  -KP     O2 Delivery Pre Treatment supplemental O2  -KP     Intra SpO2 (%) 95  -KP     O2 Delivery Intra Treatment supplemental O2  -KP     Post SpO2 (%) 98  -KP     O2 Delivery Post Treatment supplemental O2  -KP     Pre Patient Position Supine  -KP     Intra Patient Position Sitting  -KP     Post Patient Position Supine  -KP     Row Name 09/06/22 1309          Positioning and Restraints    Pre-Treatment Position in bed  -KP     Post Treatment Position bed  -KP     In Bed call light within reach;encouraged to call for assist;exit alarm on;fowlers  -           User Key  (r) = Recorded By, (t) = Taken By, (c) = Cosigned By    Initials Name Provider Type    Estefany Smith OTR Occupational Therapist               Outcome Measures     Row Name 09/06/22 1314          How much help from another is currently needed...    Putting on and taking off regular lower body clothing? 3  -KP     Bathing (including washing, rinsing, and drying) 3  -KP     Toileting (which includes using toilet bed pan or urinal) 3  -KP     Putting on and taking off regular upper body clothing 3  -KP     Taking care of personal grooming (such as brushing teeth) 3  -KP     Eating meals 3  -KP     AM-PAC 6 Clicks Score (OT) 18  -KP     Row Name 09/06/22 1314          Functional Assessment    Outcome Measure Options AM-PAC 6 Clicks Daily Activity (OT)  -KP           User Key  (r) = Recorded By, (t) = Taken By, (c) = Cosigned By    Initials Name Provider Estefany Enriquez KP, OTR Occupational Therapist                Occupational Therapy Education                 Title: PT OT SLP Therapies (Done)     Topic: Occupational Therapy (Done)     Point: ADL training (Done)     Description:   Instruct learner(s) on  proper safety adaptation and remediation techniques during self care or transfers.   Instruct in proper use of assistive devices.              Learning Progress Summary           Patient Acceptance, E,TB,D, VU,DU by  at 9/6/2022 1315    Comment: ed pt on trying 2# hand wt at home, pt not new to OT and understands OT role as she was on rehab for quite some time. pt recalls this OT. pt reports she has 2 and 3# hand wts at home now.                   Point: Home exercise program (Done)     Description:   Instruct learner(s) on appropriate technique for monitoring, assisting and/or progressing therapeutic exercises/activities.              Learning Progress Summary           Patient Acceptance, E,TB,D, VU,DU by  at 9/6/2022 1315    Comment: ed pt on trying 2# hand wt at home, pt not new to OT and understands OT role as she was on rehab for quite some time. pt recalls this OT. pt reports she has 2 and 3# hand wts at home now.                   Point: Precautions (Done)     Description:   Instruct learner(s) on prescribed precautions during self-care and functional transfers.              Learning Progress Summary           Patient Acceptance, E,TB,D, VU,DU by  at 9/6/2022 1315    Comment: ed pt on trying 2# hand wt at home, pt not new to OT and understands OT role as she was on rehab for quite some time. pt recalls this OT. pt reports she has 2 and 3# hand wts at home now.                   Point: Body mechanics (Done)     Description:   Instruct learner(s) on proper positioning and spine alignment during self-care, functional mobility activities and/or exercises.              Learning Progress Summary           Patient Acceptance, E,TB,D, VU,DU by  at 9/6/2022 1315    Comment: ed pt on trying 2# hand wt at home, pt not new to OT and understands OT role as she was on rehab for quite some time. pt recalls this OT. pt reports she has 2 and 3# hand wts at home now.                               User Key     Initials  Effective Dates Name Provider Type Discipline     06/16/21 -  Estefany Diamond OTR Occupational Therapist OT              OT Recommendation and Plan  Planned Therapy Interventions (OT): activity tolerance training, functional balance retraining, occupation/activity based interventions, strengthening exercise, transfer/mobility retraining, BADL retraining  Therapy Frequency (OT): 5 times/wk  Plan of Care Review  Plan of Care Reviewed With: patient  Progress: improving  Outcome Evaluation: pt evaluated for OT this date and was admitted from home w SOA. pt is well known to this therapist as she was previously on our rehab at [facility]. She reports she was in the hospital recently as well and home for a week. Now back in for SOA and is on O2 w sats in the mid to high 90s. This date pt completed bed mobility w SBA. sitting balance SBA. and stood w walker w CGA. pt completed socks w SBA and hair brushing w SBA to min A as she needed help to get tangles out in the back. pt plans to [place] at [place] family. pt cont to benefit from OT to incr ADL, strength, balance, and tsf.     Time Calculation:    Time Calculation- OT     Row Name 09/06/22 1316             Time Calculation- OT    OT Start Time 1037  -      OT Stop Time 1108  -      OT Time Calculation (min) 31 min  -      Total Timed Code Minutes- OT 23 minute(s)  -KP      OT Received On 09/06/22  -      OT - Next Appointment 09/07/22  -      OT Goal Re-Cert Due Date 09/20/22  -              Timed Charges    29751 - OT Therapeutic Exercise Minutes 15  -KP      76945 - OT Self Care/Mgmt Minutes 8  -KP              Untimed Charges    OT Eval/Re-eval Minutes 8  -KP              Total Minutes    Timed Charges Total Minutes 23  -KP      Untimed Charges Total Minutes 8  -KP       Total Minutes 31  -KP            User Key  (r) = Recorded By, (t) = Taken By, (c) = Cosigned By    Initials Name Provider Type     Estefany Diamond OTR Occupational  Therapist              Therapy Charges for Today     Code Description Service Date Service Provider Modifiers Qty    [id number]  OT THER PROC EA 15 MIN 9/6/2022 Estefany Diamond OTR GO 1    31498030219 HC OT SELF CARE/MGMT/TRAIN EA 15 MIN 9/6/2022 Estefany Diamond OTR GO 1    [id number]  OT EVAL MOD COMPLEXITY 2 9/6/2022 Estefany Diamond OTR GO 1               RAMO Elise  9/6/2022

## 2022-09-06 NOTE — NURSING NOTE
No big changes today, except for persistent elevated rate of flutter. In 130's this afternoon. VS otherwise stable, though hypertensive. Metoprolol added and cardiology consulted. To dialyze tomorrow. AOx4. Was able to titrate down to RA but patient feeling SOA so 2L placed back on patient.

## 2022-09-06 NOTE — PLAN OF CARE
Goal Outcome Evaluation:  Plan of Care Reviewed With: (P) patient        Progress: (P) improving  Outcome Evaluation: (P) Pt recieved supine in bed and is agreeable to PT treatment at this time. Pt was SPV for supine > sit and CGA for STS. Pt ambulated 50ft with RW CGA with single LOB episode with PT assist for correction. Pt demonstrates increased activity tolerance with increased gait distance. Pt demonstrates scissoring gait pattern and narrow IJEOMA with BLE. Vc for increased IJEOMA provided to patient with limited carryover. Nsg notified of pt complaint of purewick leakage. Pt left in chair with alarm on and all needs in reach.

## 2022-09-06 NOTE — PROGRESS NOTES
Hospital Follow Up    LOS:  LOS: 0 days   Patient Name: Zaina Martinez  Age/Sex: 56 y.o. female  : 1965  MRN: [id number]    Day of Service: 22   Length of Stay: 0  Encounter Provider: Thomas Horner MD  Place of Service: [facility] CARDIOLOGY  Patient Care Team:  Jane Kyle NP as PCP - General (Family Medicine)  Adonis Fraga Jr., MD as Consulting Physician (Hematology and Oncology)    Subjective:     Chief Complaint:   Atrial fibrillation    Interval History: Patient with acute pulmonary edema.  Chart was reviewed patient had hemodialysis.  Heart rate is now back up again.  Patient was started back on metoprolol by hospitalist.    Objective:     Objective:  Temp:  [97.9 °F (36.6 °C)-98.7 °F (37.1 °C)] 98.7 °F (37.1 °C)  Heart Rate:  [] 115  Resp:  [16-20] 20  BP: (151-181)/() 181/119     Intake/Output Summary (Last 24 hours) at 2022 1539  Last data filed at 2022 1725  Gross per 24 hour   Intake --   Output 3000 ml   Net -3000 ml     Body mass index is 22.38 kg/m².      22  0500 22  0505 22  0539   Weight: 62.6 kg (138 lb 1.6 oz) 58.4 kg (128 lb 12.8 oz) 55.5 kg (122 lb 6.4 oz)     Weight change: -2.903 kg (-6 lb 6.4 oz)      Physical Exam:   General : Alert, cooperative, in no acute distress.  Neuro: Alert,cooperative and oriented.  Lungs: CTAB. Normal respiratory effort and rate.  CV: irregular rate and rhythm, normal S1 and S2, no murmurs, gallops or rubs.  ABD: Soft, nontender, nondistended. Positive bowel sounds.  Extr: No edema or cyanosis, moves all extremities.    Lab Review:   Results from last 7 days   Lab Units 22  0535 22  0612   SODIUM mmol/L 134* 134*   POTASSIUM mmol/L 4.6 5.1   CHLORIDE mmol/L 98 96*   CO2 mmol/L 27.0 26.9   BUN mg/dL 13 21*   CREATININE mg/dL 2.60* 3.53*   GLUCOSE mg/dL 141* 46*   CALCIUM mg/dL 7.7* 7.8*         Results from last 7 days   Lab Units 22  0612 22  0616    WBC 10*3/mm3 8.43 8.51   HEMOGLOBIN g/dL 9.2* 9.5*   HEMATOCRIT % 29.2* 30.4*   PLATELETS 10*3/mm3 183 170         Results from last 7 days   Lab Units 09/06/22  0535 09/05/22  0612   MAGNESIUM mg/dL 1.9 1.8           Invalid input(s): LDLCALC  Results from last 7 days   Lab Units 09/03/22  1357   PROBNP pg/mL 63,037.0*           Current Medications:   Scheduled Meds:apixaban, 5 mg, Oral, Q12H  aspirin, 81 mg, Oral, Daily  cloNIDine, 0.1 mg, Oral, Q12H  epoetin brooke-epbx, 10,000 Units, Subcutaneous, Once per day on Mon Wed Fri  folic acid, 1 mg, Oral, Daily  gabapentin, 100 mg, Oral, Daily With Lunch  hydrALAZINE, 25 mg, Oral, Q8H  insulin lispro, 0-9 Units, Subcutaneous, TID With Meals  levothyroxine, 200 mcg, Oral, Q AM  losartan, 100 mg, Oral, Q24H  metoprolol tartrate, 50 mg, Oral, Q12H  miconazole, 1 application, Topical, Q12H  pantoprazole, 40 mg, Oral, Daily  predniSONE, 5 mg, Oral, See Admin Instructions  rOPINIRole, 0.75 mg, Oral, Nightly  sertraline, 150 mg, Oral, Daily  sodium zirconium cyclosilicate, 5 g, Oral, Daily      Continuous Infusions:     Allergies:  Allergies   Allergen Reactions   • Contrast Dye Confusion   • Ibuprofen Other (See Comments)     Kidney disease   • Prochlorperazine Other (See Comments)     Dystonic reaction            Assessment:       Acute pulmonary edema ([facility])    Hypothyroidism    Type 2 diabetes mellitus with kidney complication, with long-term current use of insulin ([facility])    ESRD (end stage renal disease) ([facility])    CAD (coronary artery disease)    Anemia due to chronic kidney disease, on chronic dialysis ([facility])    Atrial flutter ([facility])    Chronic systolic CHF (congestive heart failure) ([facility])    Thrombus of venous dialysis catheter ([facility])        Plan:      1.  Atrial fibrillation/atrial flutter.  Heart rates up I agree with the initiation of the beta-blocker again we will see how patient responds.  Patient is in atrial flutter which is more difficult to control rate.  2.  Fluid  overload.  Patient is getting hemodialysis.  3.  History of small pericardial effusion.  4.  We will reassess tomorrow to see how she responds to beta-blocker      Thomas Horner MD  09/06/22  15:39 EDT

## 2022-09-06 NOTE — PROGRESS NOTES
Still v    [facility] Progress Note      Patient Name: Zaina Martinez  : 1965  MRN: [id number]  Primary Care Physician:  System, Provider Not In  Date of admission: 9/3/2022    Subjective     Interval History:   Follow up ESRD. Hypertensive.  3 liters off yesterday. Still feels abdominal fullness and restricted on breathing.  No chest pain.  O2 sats on 2 LNC 98%.  Eating fair .    Review of Systems:   As noted above    Objective     Vitals:   Temp:  [97.9 °F (36.6 °C)-98.7 °F (37.1 °C)] 98.7 °F (37.1 °C)  Heart Rate:  [] 115  Resp:  [16-20] 20  BP: (151-181)/() 181/119  Flow (L/min):  [2] 2    Intake/Output Summary (Last 24 hours) at 2022 1411  Last data filed at 2022 1725  Gross per 24 hour   Intake --   Output 3000 ml   Net -3000 ml       Physical Exam:    General Appearance: alert, oriented x 3, no acute distress   Skin: warm and dry  HEENT: oral mucosa normal, nonicteric sclera  Neck: RIJ TDC  Lungs: Clear to auscultation. Unlabored. 2 L NC  Heart: Irreg.  Tachy.    Abdomen: soft, nontender, distended. + bs  : purwik  Extremities: no edema.  Neuro: normal speech and mental status     Scheduled Meds:     apixaban, 5 mg, Oral, Q12H  aspirin, 81 mg, Oral, Daily  cloNIDine, 0.1 mg, Oral, Q12H  epoetin brooke-epbx, 10,000 Units, Subcutaneous, Once per day on   folic acid, 1 mg, Oral, Daily  gabapentin, 100 mg, Oral, Daily With Lunch  hydrALAZINE, 25 mg, Oral, Q8H  insulin lispro, 0-9 Units, Subcutaneous, TID With Meals  levothyroxine, 200 mcg, Oral, Q AM  losartan, 100 mg, Oral, Q24H  miconazole, 1 application, Topical, Q12H  pantoprazole, 40 mg, Oral, Daily  predniSONE, 5 mg, Oral, See Admin Instructions  rOPINIRole, 0.75 mg, Oral, Nightly  sertraline, 150 mg, Oral, Daily  sodium zirconium cyclosilicate, 5 g, Oral, Daily      IV Meds:        Results Reviewed:   I have personally reviewed the results from the time of this admission to 2022 14:11 EDT      Results from last 7 days   Lab Units 09/06/22  0535 09/05/22  0612 09/04/22  0616   SODIUM mmol/L 134* 134* 135*   POTASSIUM mmol/L 4.6 5.1 4.7   CHLORIDE mmol/L 98 96* 97*   CO2 mmol/L 27.0 26.9 27.7   BUN mg/dL 13 21* 16   CREATININE mg/dL 2.60* 3.53* 2.95*   CALCIUM mg/dL 7.7* 7.8* 7.8*   GLUCOSE mg/dL 141* 46* 239*       Estimated Creatinine Clearance: 21.2 mL/min (A) (by C-G formula based on SCr of 2.6 mg/dL (H)).    Results from last 7 days   Lab Units 09/06/22  0535 09/05/22  0612 09/04/22  0616   MAGNESIUM mg/dL 1.9 1.8 1.8   PHOSPHORUS mg/dL 3.2 4.2 3.6             Results from last 7 days   Lab Units 09/05/22  0612 09/04/22  0616 09/03/22  1357   WBC 10*3/mm3 8.43 8.51 10.23   HEMOGLOBIN g/dL 9.2* 9.5* 10.3*   PLATELETS 10*3/mm3 183 170 199             Assessment / Plan     ASSESSMENT:  1. ESRD.  Dialysis MWF. Significant volume off and weight down .  2. Aflutter antioagulated.  Rate not controlled well .  3. Dialysis catheter thrombus.  PFO.  Chronic AC on eliquis .  4. Acute on chronic systolic heart failure .  5. Hypothyroid on synthroid .  6. DM2  7. Uncontrolled HTN. Meds changed yesterday.  Clonidine held last night .  Hydralazine added.     PLAN:  1. Dialysis tomorrow .  2. Ask cardiology to see about Aflutter rate .      Ruth Lira MD  09/06/22  14:11 EDT    [facility]  [phone]

## 2022-09-06 NOTE — DISCHARGE PLACEMENT REQUEST
"Zaina Martinez (56 y.o. Female)             Date of Birth   1965    Social Security Number       Address   [address] [place] [address]    Home Phone   [phone]    MRN   [id number]       Orthodoxy   None    Marital Status                               Admission Date   9/3/22    Admission Type   Emergency    Admitting Provider   Alejo Weiss MD    Attending Provider   Alejo Weiss MD    Department, Room/Bed   [address], [address]       Discharge Date       Discharge Disposition       Discharge Destination                               Attending Provider: Alejo Weiss MD    Allergies: Contrast Dye, Ibuprofen, Prochlorperazine    Isolation: None   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)   Code Status: CPR   Advance Care Planning Activity    Ht: 157.5 cm (62.01\")   Wt: 55.5 kg (122 lb 6.4 oz)    Admission Cmt: None   Principal Problem: Acute pulmonary edema (HCC) [J81.0]                 Active Insurance as of 9/3/2022     Primary Coverage     Payor Plan Insurance Group Employer/Plan Group    ANTH MEDICARE REPLACEMENT ANTH MEDICARE ADVANTAGE [id number]     Payor Plan Address Payor Plan Phone Number Payor Plan Fax Number Effective Dates    [address] [phone]  1/1/2019 - None Entered    [facility] [id number]       Subscriber Name Subscriber Birth Date Member ID       ZAINA MARTINEZ 1965 [id number]                 Emergency Contacts      (Rel.) Home Phone Work Phone Mobile Phone    Marv Martinez (Spouse) [phone] -- [phone]    JuanFiona (Daughter) [phone] -- [phone]              "

## 2022-09-06 NOTE — PLAN OF CARE
Problem: Adult Inpatient Plan of Care  Goal: Plan of Care Review  Outcome: Ongoing, Progressing  Flowsheets (Taken 9/6/2022 0315)  Progress: no change  Plan of Care Reviewed With: patient  Outcome Evaluation: Pt. A/OX4, on O2@ 2L nc, c/o BLE pain and head ache, prn pain medication given, safety measures in place, will continue to monitor  Goal: Patient-Specific Goal (Individualized)  Outcome: Ongoing, Progressing  Goal: Absence of Hospital-Acquired Illness or Injury  Outcome: Ongoing, Progressing  Intervention: Identify and Manage Fall Risk  Recent Flowsheet Documentation  Taken 9/6/2022 0200 by Ebony Hahn RN  Safety Promotion/Fall Prevention: safety round/check completed  Taken 9/6/2022 0000 by Ebony Hahn RN  Safety Promotion/Fall Prevention:   safety round/check completed   room organization consistent   fall prevention program maintained   clutter free environment maintained   assistive device/personal items within reach  Taken 9/5/2022 2200 by Ebony Hahn RN  Safety Promotion/Fall Prevention:   safety round/check completed   room organization consistent   nonskid shoes/slippers when out of bed   fall prevention program maintained   clutter free environment maintained  Taken 9/5/2022 2000 by Ebony Hahn RN  Safety Promotion/Fall Prevention:   safety round/check completed   room organization consistent   fall prevention program maintained   clutter free environment maintained  Intervention: Prevent Skin Injury  Recent Flowsheet Documentation  Taken 9/6/2022 0200 by Ebony Hahn RN  Body Position: position changed independently  Taken 9/6/2022 0000 by Ebony Hahn RN  Body Position: sitting up in bed  Taken 9/5/2022 2200 by Ebony Hahn RN  Body Position:   tilted   right  Taken 9/5/2022 2000 by Ebony Hahn RN  Body Position: sitting up in bed  Skin Protection:   adhesive use limited   incontinence pads utilized  Intervention: Prevent Infection  Recent Flowsheet Documentation  Taken 9/6/2022 0200 by  Palton, Ebony, RN  Infection Prevention:   single patient room provided   rest/sleep promoted  Taken 9/6/2022 0000 by Ebony Hahn RN  Infection Prevention: single patient room provided  Taken 9/5/2022 2200 by Ebony Hahn RN  Infection Prevention:   hand hygiene promoted   single patient room provided  Taken 9/5/2022 2000 by Ebony Hahn RN  Infection Prevention:   single patient room provided   hand hygiene promoted   environmental surveillance performed  Goal: Optimal Comfort and Wellbeing  Outcome: Ongoing, Progressing  Intervention: Monitor Pain and Promote Comfort  Recent Flowsheet Documentation  Taken 9/6/2022 0308 by Ebony Hahn RN  Pain Management Interventions:   position adjusted   see MAR  Intervention: Provide Person-Centered Care  Recent Flowsheet Documentation  Taken 9/6/2022 0200 by Ebony Hahn RN  Trust Relationship/Rapport: care explained  Taken 9/5/2022 2000 by Ebony Hahn RN  Trust Relationship/Rapport:   care explained   choices provided   questions answered  Goal: Readiness for Transition of Care  Outcome: Ongoing, Progressing   Goal Outcome Evaluation:  Plan of Care Reviewed With: patient        Progress: no change  Outcome Evaluation: Pt. A/OX4, on O2@ 2L nc, c/o BLE pain and head ache, prn pain medication given, safety measures in place, will continue to monitor

## 2022-09-07 LAB
ALBUMIN SERPL-MCNC: 2.9 G/DL (ref 3.5–5.2)
ANION GAP SERPL CALCULATED.3IONS-SCNC: 9 MMOL/L (ref 5–15)
BUN SERPL-MCNC: 27 MG/DL (ref 6–20)
BUN/CREAT SERPL: 8.4 (ref 7–25)
CALCIUM SPEC-SCNC: 7.4 MG/DL (ref 8.6–10.5)
CHLORIDE SERPL-SCNC: 99 MMOL/L (ref 98–107)
CO2 SERPL-SCNC: 21 MMOL/L (ref 22–29)
CREAT SERPL-MCNC: 3.22 MG/DL (ref 0.57–1)
DEPRECATED RDW RBC AUTO: 49.4 FL (ref 37–54)
EGFRCR SERPLBLD CKD-EPI 2021: 16.3 ML/MIN/1.73
ERYTHROCYTE [DISTWIDTH] IN BLOOD BY AUTOMATED COUNT: 16.1 % (ref 12.3–15.4)
GLUCOSE BLDC GLUCOMTR-MCNC: 193 MG/DL (ref 70–130)
GLUCOSE BLDC GLUCOMTR-MCNC: 226 MG/DL (ref 70–130)
GLUCOSE BLDC GLUCOMTR-MCNC: 243 MG/DL (ref 70–130)
GLUCOSE BLDC GLUCOMTR-MCNC: 283 MG/DL (ref 70–130)
GLUCOSE SERPL-MCNC: 257 MG/DL (ref 65–99)
HCT VFR BLD AUTO: 31.9 % (ref 34–46.6)
HGB BLD-MCNC: 9.9 G/DL (ref 12–15.9)
MCH RBC QN AUTO: 26.7 PG (ref 26.6–33)
MCHC RBC AUTO-ENTMCNC: 31 G/DL (ref 31.5–35.7)
MCV RBC AUTO: 86 FL (ref 79–97)
PHOSPHATE SERPL-MCNC: 3.4 MG/DL (ref 2.5–4.5)
PLATELET # BLD AUTO: 154 10*3/MM3 (ref 140–450)
PMV BLD AUTO: 10.7 FL (ref 6–12)
POTASSIUM SERPL-SCNC: 5.6 MMOL/L (ref 3.5–5.2)
RBC # BLD AUTO: 3.71 10*6/MM3 (ref 3.77–5.28)
SODIUM SERPL-SCNC: 129 MMOL/L (ref 136–145)
TSH SERPL DL<=0.05 MIU/L-ACNC: 99.2 UIU/ML (ref 0.27–4.2)
WBC NRBC COR # BLD: 9.66 10*3/MM3 (ref 3.4–10.8)

## 2022-09-07 PROCEDURE — 25010000002 HEPARIN (PORCINE) PER 1000 UNITS: Performed by: INTERNAL MEDICINE

## 2022-09-07 PROCEDURE — 80069 RENAL FUNCTION PANEL: CPT | Performed by: INTERNAL MEDICINE

## 2022-09-07 PROCEDURE — 82962 GLUCOSE BLOOD TEST: CPT

## 2022-09-07 PROCEDURE — G0378 HOSPITAL OBSERVATION PER HR: HCPCS

## 2022-09-07 PROCEDURE — 63710000001 INSULIN LISPRO (HUMAN) PER 5 UNITS: Performed by: INTERNAL MEDICINE

## 2022-09-07 PROCEDURE — 63710000001 PREDNISONE PER 5 MG: Performed by: INTERNAL MEDICINE

## 2022-09-07 PROCEDURE — 99214 OFFICE O/P EST MOD 30 MIN: CPT | Performed by: INTERNAL MEDICINE

## 2022-09-07 PROCEDURE — 85027 COMPLETE CBC AUTOMATED: CPT | Performed by: INTERNAL MEDICINE

## 2022-09-07 PROCEDURE — 97530 THERAPEUTIC ACTIVITIES: CPT

## 2022-09-07 PROCEDURE — 25010000002 EPOETIN ALFA-EPBX 10000 UNIT/ML SOLUTION: Performed by: INTERNAL MEDICINE

## 2022-09-07 RX ORDER — HYDROXYZINE HYDROCHLORIDE 25 MG/1
25 TABLET, FILM COATED ORAL 3 TIMES DAILY PRN
Status: DISCONTINUED | OUTPATIENT
Start: 2022-09-07 | End: 2022-09-13 | Stop reason: HOSPADM

## 2022-09-07 RX ORDER — PREDNISONE 10 MG/1
5 TABLET ORAL
Status: DISCONTINUED | OUTPATIENT
Start: 2022-09-07 | End: 2022-09-13 | Stop reason: HOSPADM

## 2022-09-07 RX ADMIN — OXYCODONE 5 MG: 5 TABLET ORAL at 04:56

## 2022-09-07 RX ADMIN — INSULIN LISPRO 5 UNITS: 100 INJECTION, SOLUTION INTRAVENOUS; SUBCUTANEOUS at 17:12

## 2022-09-07 RX ADMIN — CLONIDINE HYDROCHLORIDE 0.1 MG: 0.1 TABLET ORAL at 04:56

## 2022-09-07 RX ADMIN — APIXABAN 5 MG: 5 TABLET, FILM COATED ORAL at 14:07

## 2022-09-07 RX ADMIN — APIXABAN 5 MG: 5 TABLET, FILM COATED ORAL at 20:54

## 2022-09-07 RX ADMIN — Medication 1 MG: at 14:08

## 2022-09-07 RX ADMIN — INSULIN LISPRO 2 UNITS: 100 INJECTION, SOLUTION INTRAVENOUS; SUBCUTANEOUS at 14:28

## 2022-09-07 RX ADMIN — MICONAZOLE NITRATE 1 APPLICATION: 20 CREAM TOPICAL at 14:42

## 2022-09-07 RX ADMIN — HYDRALAZINE HYDROCHLORIDE 25 MG: 50 TABLET, FILM COATED ORAL at 05:01

## 2022-09-07 RX ADMIN — LEVOTHYROXINE SODIUM 200 MCG: 0.1 TABLET ORAL at 05:01

## 2022-09-07 RX ADMIN — ROPINIROLE HYDROCHLORIDE 0.75 MG: 0.5 TABLET, FILM COATED ORAL at 20:55

## 2022-09-07 RX ADMIN — PANTOPRAZOLE SODIUM 40 MG: 40 TABLET, DELAYED RELEASE ORAL at 14:08

## 2022-09-07 RX ADMIN — GABAPENTIN 100 MG: 100 CAPSULE ORAL at 14:34

## 2022-09-07 RX ADMIN — HYDRALAZINE HYDROCHLORIDE 25 MG: 50 TABLET, FILM COATED ORAL at 20:55

## 2022-09-07 RX ADMIN — OXYCODONE 5 MG: 5 TABLET ORAL at 23:26

## 2022-09-07 RX ADMIN — HEPARIN SODIUM 4000 UNITS: 1000 INJECTION INTRAVENOUS; SUBCUTANEOUS at 12:33

## 2022-09-07 RX ADMIN — SODIUM ZIRCONIUM CYCLOSILICATE 5 G: 5 POWDER, FOR SUSPENSION ORAL at 14:08

## 2022-09-07 RX ADMIN — SERTRALINE 150 MG: 100 TABLET, FILM COATED ORAL at 14:07

## 2022-09-07 RX ADMIN — CLONIDINE HYDROCHLORIDE 0.1 MG: 0.1 TABLET ORAL at 16:49

## 2022-09-07 RX ADMIN — INSULIN GLARGINE-YFGN 5 UNITS: 100 INJECTION, SOLUTION SUBCUTANEOUS at 14:27

## 2022-09-07 RX ADMIN — PREDNISONE 5 MG: 10 TABLET ORAL at 14:29

## 2022-09-07 RX ADMIN — HYDRALAZINE HYDROCHLORIDE 25 MG: 50 TABLET, FILM COATED ORAL at 14:28

## 2022-09-07 RX ADMIN — LOSARTAN POTASSIUM 100 MG: 100 TABLET, FILM COATED ORAL at 14:07

## 2022-09-07 RX ADMIN — ASPIRIN 81 MG: 81 TABLET, COATED ORAL at 14:07

## 2022-09-07 RX ADMIN — METOPROLOL TARTRATE 50 MG: 50 TABLET, FILM COATED ORAL at 20:55

## 2022-09-07 RX ADMIN — Medication 3 MG: at 20:54

## 2022-09-07 RX ADMIN — EPOETIN ALFA-EPBX 10000 UNITS: 10000 INJECTION, SOLUTION INTRAVENOUS; SUBCUTANEOUS at 19:02

## 2022-09-07 NOTE — PLAN OF CARE
Goal Outcome Evaluation:  Plan of Care Reviewed With: patient        Progress: no change  Outcome Evaluation: Pt c/o hailey leg pain prn pain meds given x2 with relief noted. Pts bp merrill dbp been high. HR also continue to stay ST but not more than 130's.Pt to have HD today. Will continue to monitor.

## 2022-09-07 NOTE — PROGRESS NOTES
Name: Zaina Martinez ADMIT: 9/3/2022   : 1965  PCP: Jane Kyle NP    MRN: [id number] LOS: 0 days   AGE/SEX: 56 y.o. female  ROOM: [facility]   Subjective   Chief Complaint   Patient presents with   • Shortness of Breath      Saw in HD. Reporting dyspnea stable to improving. No cough. No NVD. Having some itching, generalized. No rash.    Objective   Vital Signs  Temp:  [98 °F (36.7 °C)-98.6 °F (37 °C)] 98.6 °F (37 °C)  Heart Rate:  [] 87  Resp:  [20] 20  BP: (147-168)/() 152/100  SpO2:  [94 %-98 %] 98 %  on  Flow (L/min):  [2] 2;   Device (Oxygen Therapy): nasal cannula  Body mass index is 21.97 kg/m².    Physical Exam  Vitals and nursing note reviewed.   Constitutional:       Appearance: She is ill-appearing (chronically). She is not diaphoretic.   HENT:      Head: Normocephalic and atraumatic.   Eyes:      General:         Right eye: No discharge.         Left eye: No discharge.      Conjunctiva/sclera: Conjunctivae normal.   Cardiovascular:      Rate and Rhythm: Normal rate. Rhythm irregular.   Pulmonary:      Effort: Pulmonary effort is normal.      Breath sounds: Decreased breath sounds present. No wheezing.   Abdominal:      General: There is no distension.      Palpations: Abdomen is soft.      Tenderness: There is no abdominal tenderness. There is no guarding or rebound.   Musculoskeletal:         General: No swelling or tenderness.      Cervical back: Neck supple. No tenderness.   Skin:     General: Skin is warm and dry.   Neurological:      Mental Status: She is alert. Mental status is at baseline.   Psychiatric:         Mood and Affect: Mood normal.         Behavior: Behavior normal.     I have reviewed the physical exam today and updated where needed as above.      Results Review:       I reviewed the patient's new clinical results.   I reviewed telemetry, flutter, rate 80s.    Results from last 7 days   Lab Units 22  0508 22  0612 22  0616 22  1357   WBC  10*3/mm3 9.66 8.43 8.51 10.23   HEMOGLOBIN g/dL 9.9* 9.2* 9.5* 10.3*   PLATELETS 10*3/mm3 154 183 170 199     Results from last 7 days   Lab Units 09/07/22  0508 09/06/22  0535 09/05/22 0612 09/04/22  0616   SODIUM mmol/L 129* 134* 134* 135*   POTASSIUM mmol/L 5.6* 4.6 5.1 4.7   CHLORIDE mmol/L 99 98 96* 97*   CO2 mmol/L 21.0* 27.0 26.9 27.7   BUN mg/dL 27* 13 21* 16   CREATININE mg/dL 3.22* 2.60* 3.53* 2.95*   GLUCOSE mg/dL 257* 141* 46* 239*   Estimated Creatinine Clearance: 16.8 mL/min (A) (by C-G formula based on SCr of 3.22 mg/dL (H)).  Results from last 7 days   Lab Units 09/07/22 0508 09/06/22 0535 09/05/22 0612 09/04/22  0616   CALCIUM mg/dL 7.4* 7.7* 7.8* 7.8*   ALBUMIN g/dL 2.90* 3.30* 2.90* 2.90*   MAGNESIUM mg/dL  --  1.9 1.8 1.8   PHOSPHORUS mg/dL 3.4 3.2 4.2 3.6          apixaban, 5 mg, Oral, Q12H  aspirin, 81 mg, Oral, Daily  cloNIDine, 0.1 mg, Oral, Q12H  epoetin brooke-epbx, 10,000 Units, Subcutaneous, Once per day on Mon Wed Fri  folic acid, 1 mg, Oral, Daily  gabapentin, 100 mg, Oral, Daily With Lunch  hydrALAZINE, 25 mg, Oral, Q8H  insulin lispro, 0-9 Units, Subcutaneous, TID With Meals  levothyroxine, 200 mcg, Oral, Q AM  losartan, 100 mg, Oral, Q24H  metoprolol tartrate, 50 mg, Oral, Q12H  miconazole, 1 application, Topical, Q12H  pantoprazole, 40 mg, Oral, Daily  predniSONE, 5 mg, Oral, Daily With Breakfast  rOPINIRole, 0.75 mg, Oral, Nightly  sertraline, 150 mg, Oral, Daily  sodium zirconium cyclosilicate, 5 g, Oral, Daily       Diet Regular; Cardiac    Assessment & Plan      Active Hospital Problems    Diagnosis  POA   • **Acute pulmonary edema (HCC) [J81.0]  Yes   • Atrial flutter (HCC) [I48.92]  Yes   • Chronic systolic CHF (congestive heart failure) (HCC) [I50.22]  Yes   • Thrombus of venous dialysis catheter (HCC) [T82.868A]  Yes   • Anemia due to chronic kidney disease, on chronic dialysis (HCC) [N18.6, D63.1, Z99.2]  Not Applicable   • CAD (coronary artery disease) [I25.10]  Yes   •  ESRD (end stage renal disease) ([facility]) [N18.6]  Yes   • Hypothyroidism [E03.9]  Yes   • Type 2 diabetes mellitus with kidney complication, with long-term current use of insulin ([facility]) [E11.29, Z79.4]  Not Applicable      Resolved Hospital Problems   No resolved problems to display.       · Acute Pulmonary Edema: Volume removal with HD. Wean O2 as tolerated. Nephrology following.  · ESRD  · Atrial Flutter: Metoprolol. AC as below. Cardiology following.  · Dialysis catheter thrombus: Extended into atrium on prior echo. PFO noted. On chronic AC for this with eliquis.  · A/C Systolic Heart Failure  · HTN: Multiple medications. Defer to nephrology.  · Hypothyroidism: on synthroid  · ACD: Epo noted.  · DM2: A1c 7.7. Hypoglycemia this admit. Needed insulin yesterday, 8 units. On lantus at home. Will resume a low 5 unit dose of lantus daily and monitor.  · PPx: AC as above  · Disposition: HH/1-2 days    Alejo Weiss MD  [facility]  09/07/22  13:20 EDT    Dictated portions using Dragon dictation software.    During the entire encounter, I was wearing recommended PPE including face mask and eye protection. Hand sanitization was performed prior to entering room and upon exit.

## 2022-09-07 NOTE — PROGRESS NOTES
Still v    [facility] Progress Note      Patient Name: aZina Martinez  : 1965  MRN: [id number]  Primary Care Physician:  Jane Kyle NP  Date of admission: 9/3/2022    Subjective     Interval History:   Follow up ESRD. Hypertensive.  On dialysis.  /83.  Goal 4 kg.  No cramping. Abdomen feels full .    Review of Systems:   As noted above    Objective     Vitals:   Temp:  [98 °F (36.7 °C)-98.7 °F (37.1 °C)] 98.5 °F (36.9 °C)  Heart Rate:  [107-129] 107  Resp:  [20] 20  BP: (147-181)/() 155/109  Flow (L/min):  [2] 2    Intake/Output Summary (Last 24 hours) at 2022 1152  Last data filed at 2022 1756  Gross per 24 hour   Intake --   Output 200 ml   Net -200 ml       Physical Exam:    General Appearance: alert, oriented x 3, no acute distress . On dialysis.  Qb 400. TDC  Skin: warm and dry  HEENT: oral mucosa normal, nonicteric sclera  Neck: RIJ TDC  Lungs: Clear to auscultation. Unlabored. 2 L NC  Heart: Irreg.  Tachy.    Abdomen: soft, nontender, distended. + bs  : purwik  Extremities: no edema.  Neuro: normal speech and mental status     Scheduled Meds:     apixaban, 5 mg, Oral, Q12H  aspirin, 81 mg, Oral, Daily  cloNIDine, 0.1 mg, Oral, Q12H  epoetin brooke-epbx, 10,000 Units, Subcutaneous, Once per day on   folic acid, 1 mg, Oral, Daily  gabapentin, 100 mg, Oral, Daily With Lunch  hydrALAZINE, 25 mg, Oral, Q8H  insulin lispro, 0-9 Units, Subcutaneous, TID With Meals  levothyroxine, 200 mcg, Oral, Q AM  losartan, 100 mg, Oral, Q24H  metoprolol tartrate, 50 mg, Oral, Q12H  miconazole, 1 application, Topical, Q12H  pantoprazole, 40 mg, Oral, Daily  predniSONE, 5 mg, Oral, See Admin Instructions  rOPINIRole, 0.75 mg, Oral, Nightly  sertraline, 150 mg, Oral, Daily  sodium zirconium cyclosilicate, 5 g, Oral, Daily      IV Meds:        Results Reviewed:   I have personally reviewed the results from the time of this admission to 2022 11:52 EDT     Results  from last 7 days   Lab Units 09/07/22  0508 09/06/22  0535 09/05/22  0612   SODIUM mmol/L 129* 134* 134*   POTASSIUM mmol/L 5.6* 4.6 5.1   CHLORIDE mmol/L 99 98 96*   CO2 mmol/L 21.0* 27.0 26.9   BUN mg/dL 27* 13 21*   CREATININE mg/dL 3.22* 2.60* 3.53*   CALCIUM mg/dL 7.4* 7.7* 7.8*   GLUCOSE mg/dL 257* 141* 46*       Estimated Creatinine Clearance: 17.3 mL/min (A) (by C-G formula based on SCr of 3.22 mg/dL (H)).    Results from last 7 days   Lab Units 09/07/22  0508 09/06/22  0535 09/05/22  0612 09/04/22  0616   MAGNESIUM mg/dL  --  1.9 1.8 1.8   PHOSPHORUS mg/dL 3.4 3.2 4.2 3.6             Results from last 7 days   Lab Units 09/07/22  0508 09/05/22  0612 09/04/22  0616 09/03/22  1357   WBC 10*3/mm3 9.66 8.43 8.51 10.23   HEMOGLOBIN g/dL 9.9* 9.2* 9.5* 10.3*   PLATELETS 10*3/mm3 154 183 170 199             Assessment / Plan     ASSESSMENT:  1. ESRD.  Dialysis MWF. Continue to remove volume .  Correct K and sodium .  2. Aflutter antioagulated.  Rate not controlled well . Dr. Horner addressing .  3. Dialysis catheter thrombus.  PFO.  Chronic AC on eliquis .  4. Acute on chronic systolic heart failure . Volume off with HD  5. Hypothyroid on synthroid .  Recheck TSH. May need to increase replacement .  6. DM2  7. Uncontrolled HTN. Meds changed yesterday.  Clonidine held last night .  Hydralazine added. See what BP does with volume off .    PLAN:  1. Dialysis today .  2. Check Tsh .      Ruth Lira MD  09/07/22  11:52 EDT    [facility]  [phone]

## 2022-09-07 NOTE — THERAPY TREATMENT NOTE
Patient Name: Zaina Martinez  : 1965    MRN: [id number]                              Today's Date: 2022       Admit Date: 9/3/2022    Visit Dx:     ICD-10-CM ICD-9-CM   1. Acute pulmonary edema ([facility])  J81.0 518.4   2. Hypoxia  R09.02 799.02   3. Hypervolemia, unspecified hypervolemia type  E87.70 276.69   4. ESRD on dialysis ([facility])  N18.6 585.6    Z99.2 V45.11   5. Chronic anemia  D64.9 285.9   6. Hyperglycemia  R73.9 790.29     Patient Active Problem List   Diagnosis   • Renal insufficiency   • Hypertensive disorder   • Hypothyroidism   • Type 2 diabetes mellitus with kidney complication, with long-term current use of insulin ([facility])   • Rheumatoid arthritis ([facility])   • Angioedema   • Esophageal dysmotility   • Anemia   • Medically noncompliant   • Myocardial infarction due to demand ischemia ([facility])   • Enteritis   • PRES (posterior reversible encephalopathy syndrome)   • Urine retention   • Klebsiella infection   • Superficial thrombophlebitis   • Generalized weakness   • ESRD (end stage renal disease) ([facility])   • CAD (coronary artery disease)   • Abnormal urinalysis   • Chronic diastolic CHF (congestive heart failure) ([facility])   • Pyelonephritis   • Calculus of gallbladder with acute on chronic cholecystitis without obstruction   • Pleural effusion on right   • Anemia due to chronic kidney disease, on chronic dialysis ([facility])   • Abnormal findings on diagnostic imaging of other specified body structures   • Acute upper respiratory infection   • Agitation   • Alkaline phosphatase raised   • Casts present in urine   • Cellulitis of toe   • Hip pain   • Community acquired pneumonia   • Depressive disorder   • Diarrhea of presumed infectious origin   • Difficult or painful urination   • Disease due to severe acute respiratory syndrome coronavirus 2 (SARS-CoV-2)   • Dyspnea   • Encounter for follow-up examination after completed treatment for conditions other than malignant neoplasm   • H/O: hypothyroidism   •  Hyperlipidemia   • Hypomagnesemia   • Intractable vomiting with nausea   • Leukocytosis   • Luetscher's syndrome   • Need for influenza vaccination   • Restless legs   • Noncompliance with treatment   • Shoulder pain   • Urinary tract infectious disease   • Metabolic encephalopathy   • Abnormal findings on diagnostic imaging of abdomen   • Status post cholecystectomy   • Hyponatremia   • Leukocytosis   • Acute metabolic encephalopathy   • Encephalopathy, toxic   • Acute CVA (cerebrovascular accident) ([facility])   • Intracranial hemorrhage (HCC)   • Stroke (HCC)   • Abnormal urinalysis   • Diabetic muscle infarction (HCC)   • Other insomnia   • Altered mental status   • History of stroke- R MCA s/p TPA with subsequent ICH with debility   • Heart murmur   • Bradycardia   • Left lower lobe pneumonia   • Metabolic encephalopathy   • Pericardial effusion   • Pleural effusion   • Acute pulmonary edema (HCC)   • Atrial flutter (HCC)   • Chronic systolic CHF (congestive heart failure) (HCC)   • Thrombus of venous dialysis catheter (HCC)     Past Medical History:   Diagnosis Date   • Acute CVA (cerebrovascular accident) (HCC) 5/1/2022   • Acute on chronic diastolic CHF (congestive heart failure) (HCC)    • CAD (coronary artery disease) 12/20/2021   • Diabetes (HCC)    • Disease of thyroid gland    • GERD (gastroesophageal reflux disease)    • Hyperlipidemia 11/30/2018   • Hypertension    • Rheumatoid arthritis (HCC)      Past Surgical History:   Procedure Laterality Date   • CHOLECYSTECTOMY WITH INTRAOPERATIVE CHOLANGIOGRAM N/A 1/10/2022    Procedure: Laparoscopic cholecystectomy with intraoperative cholangiogram;  Surgeon: Ramana Raygoza MD;  Location: [facility];  Service: General;  Laterality: N/A;   • EYE SURGERY     • HYSTERECTOMY     • INSERTION HEMODIALYSIS CATHETER N/A 12/6/2021    Procedure: HEMODIALYSIS CATHETER INSERTION;  Surgeon: Keli Salazar MD;  Location: [facility];  Service: Vascular;   Laterality: N/A;   • INSERTION HEMODIALYSIS CATHETER N/A 5/3/2022    Procedure: TUNNELED CATHETER PLACEMENT;  Surgeon: Keli Salazar MD;  Location: [facility] MAIN OR;  Service: Vascular;  Laterality: N/A;   • MUSCLE BIOPSY Left 6/15/2022    Procedure: Left quadriceps muscle biopsy;  Surgeon: Marques Ma MD;  Location: [facility] MAIN OR;  Service: Neurosurgery;  Laterality: Left;      General Information     Row Name 09/07/22 1359          Physical Therapy Time and Intention    Document Type therapy note (daily note) (P)   -TT     Mode of Treatment physical therapy (P)   -TT     Row Name 09/07/22 1359          General Information    Patient Profile Reviewed yes (P)   -TT           User Key  (r) = Recorded By, (t) = Taken By, (c) = Cosigned By    Initials Name Provider Type    TT Yvette Tee, PT Student PT Student               Mobility     Row Name 09/07/22 1359          Bed Mobility    Bed Mobility supine-sit (P)   -TT     Supine-Sit [place] (Bed Mobility) supervision (P)   -TT     Row Name 09/07/22 1359          Sit-Stand Transfer    Sit-Stand [place] (Transfers) contact guard (P)   -TT     Assistive Device (Sit-Stand Transfers) walker, front-wheeled (P)   -TT     Row Name 09/07/22 1359          Gait/Stairs (Locomotion)    [place] Level (Gait) contact guard (P)   -TT     Assistive Device (Gait) walker, front-wheeled (P)   -TT     Distance in Feet (Gait) 15 (P)   -TT     Deviations/Abnormal Patterns (Gait) base of support, narrow;gait speed decreased (P)   -TT     Bilateral Gait Deviations forward flexed posture (P)   -TT           User Key  (r) = Recorded By, (t) = Taken By, (c) = Cosigned By    Initials Name Provider Type    TT Yvette Tee, PT Student PT Student               Obj/Interventions    No documentation.                Goals/Plan    No documentation.                Clinical Impression     Row Name 09/07/22 1400          Pain    Pretreatment Pain Rating 0/10 - no pain (P)   -TT      Row Name 09/07/22 1400          Plan of Care Review    Plan of Care Reviewed With patient (P)   -TT     Outcome Evaluation Pt recieved supine in bed and is agreeable to PT treatment at this time. Pt reports no pain but reports that her legs feel weak due to pt just returning from dialysis. Pt was SPV for supine > sit, CGA for STS and ambulated 15ft with RW CGA. Pt left in chair with alarm on and all needs in reach. (P)   -TT     Row Name 09/07/22 1400          Positioning and Restraints    Pre-Treatment Position in bed (P)   -TT     Post Treatment Position chair (P)   -TT     In Chair notified nsg;reclined;call light within reach;encouraged to call for assist;exit alarm on (P)   -TT           User Key  (r) = Recorded By, (t) = Taken By, (c) = Cosigned By    Initials Name Provider Type    TT Yvette Tee, PT Student PT Student               Outcome Measures     Row Name 09/07/22 1402          How much help from another person do you currently need...    Turning from your back to your side while in flat bed without using bedrails? 4 (P)   -TT     Moving from lying on back to sitting on the side of a flat bed without bedrails? 4 (P)   -TT     Moving to and from a bed to a chair (including a wheelchair)? 3 (P)   -TT     Standing up from a chair using your arms (e.g., wheelchair, bedside chair)? 3 (P)   -TT     Climbing 3-5 steps with a railing? 3 (P)   -TT     To walk in hospital room? 3 (P)   -TT     AM-PAC 6 Clicks Score (PT) 20 (P)   -TT     Highest level of mobility 6 --> Walked 10 steps or more (P)   -TT           User Key  (r) = Recorded By, (t) = Taken By, (c) = Cosigned By    Initials Name Provider Type    Yvette Chavez, PT Student PT Student                             Physical Therapy Education                 Title: PT OT SLP Therapies (Done)     Topic: Physical Therapy (Done)     Point: Mobility training (Done)     Learning Progress Summary           Patient Acceptance, E,TB, VU by TT at 9/7/2022  1402    Acceptance, E,TB, VU by TT at 9/6/2022 1514    Acceptance, E,D, VU,NR by MS at 9/4/2022 1139                   Point: Home exercise program (Done)     Learning Progress Summary           Patient Acceptance, E,D, VU,NR by MS at 9/4/2022 1139                   Point: Body mechanics (Done)     Learning Progress Summary           Patient Acceptance, E,D, VU,NR by MS at 9/4/2022 1139                   Point: Precautions (Done)     Learning Progress Summary           Patient Acceptance, E,D, VU,NR by MS at 9/4/2022 1139                               User Key     Initials Effective Dates Name Provider Type Discipline    MS 06/16/21 -  Mat Baptiste PT Physical Therapist PT    TT 08/30/22 -  Yvette Tee, PT Student PT Student PT              PT Recommendation and Plan     Plan of Care Reviewed With: (P) patient  Progress: improving  Outcome Evaluation: (P) Pt recieved supine in bed and is agreeable to PT treatment at this time. Pt reports no pain but reports that her legs feel weak due to pt just returning from dialysis. Pt was SPV for supine > sit, CGA for STS and ambulated 15ft with RW CGA. Pt left in chair with alarm on and all needs in reach.     Time Calculation:    PT Charges     Row Name 09/07/22 1403             Time Calculation    Start Time 1327 (P)   -TT      Stop Time 1340 (P)   -TT      Time Calculation (min) 13 min (P)   -TT      PT Received On 09/07/22 (P)   -TT      PT - Next Appointment 09/08/22 (P)   -TT              Time Calculation- PT    Total Timed Code Minutes- PT 13 minute(s) (P)   -TT              Timed Charges    23983 - PT Therapeutic Activity Minutes 13 (P)   -TT              Total Minutes    Timed Charges Total Minutes 13 (P)   -TT       Total Minutes 13 (P)   -TT            User Key  (r) = Recorded By, (t) = Taken By, (c) = Cosigned By    Initials Name Provider Type    TT Yvette Tee, PT Student PT Student              Therapy Charges for Today     Code Description Service  Date Service Provider Modifiers Qty    [id number]  PT THER PROC EA 15 MIN 9/6/2022 Yvette Tee, PT Student GP 1    [id number] HC PT THERAPEUTIC ACT EA 15 MIN 9/7/2022 Yvette Tee, PT Student GP 1          PT G-Codes  Outcome Measure Options: AM-PAC 6 Clicks Daily Activity (OT)  AM-PAC 6 Clicks Score (PT): (P) 20  AM-PAC 6 Clicks Score (OT): 18    Yvette Tee PT Student  9/7/2022

## 2022-09-07 NOTE — PROGRESS NOTES
"CC: Atrial flutter with rapid ventricular response    Interval History: No acute events overnight      Vital Signs  Temp:  [98 °F (36.7 °C)-98.7 °F (37.1 °C)] 98.5 °F (36.9 °C)  Heart Rate:  [107-129] 107  Resp:  [20] 20  BP: (147-181)/() 155/109    Intake/Output Summary (Last 24 hours) at 9/7/2022 1059  Last data filed at 9/6/2022 1756  Gross per 24 hour   Intake --   Output 200 ml   Net -200 ml     Flowsheet Rows    Flowsheet Row First Filed Value   Admission Height 157.5 cm (62.01\") Documented at 09/03/2022 1355   Admission Weight 64.9 kg (143 lb 1.3 oz) Documented at 09/03/2022 1355          PHYSICAL EXAM:  General: No acute distress  Resp:NL Rate, symmetric chest expansion,unlabored, clear  CV: Tachycardic, NL PMI, NL S1 and S2, no Murmur, no gallop, no rub, No JVD.   ABD:Nl sounds, no masses or tenderness, nondistended, no guarding or rebound  Neuro: alert,cooperative and oriented  Extr:Normal pedal pulses, No edema or cyanosis, moves all extremities      Results Review:    Results from last 7 days   Lab Units 09/07/22  0508   SODIUM mmol/L 129*   POTASSIUM mmol/L 5.6*   CHLORIDE mmol/L 99   CO2 mmol/L 21.0*   BUN mg/dL 27*   CREATININE mg/dL 3.22*   GLUCOSE mg/dL 257*   CALCIUM mg/dL 7.4*         Results from last 7 days   Lab Units 09/07/22  0508   WBC 10*3/mm3 9.66   HEMOGLOBIN g/dL 9.9*   HEMATOCRIT % 31.9*   PLATELETS 10*3/mm3 154             Results from last 7 days   Lab Units 09/06/22  0535   MAGNESIUM mg/dL 1.9         I reviewed the patient's new clinical results.  I personally viewed and interpreted the patient's EKG/Telemetry data        Medication Review:   Meds reviewed         Assessment/Plan    1.    Shortness of breath- she reports some improved symptoms since getting dialysis today.  Her usual schedule is Monday Wednesday Friday.  She reports feeling tired though.  2.    Atrial flutter with rapid ventricular response  On ELiquis   She was not on AV radha blocker before this admission as " her heart rate was well controlled.  3.  Dialysis catheter thrombus on Eliquis.  4.  History of small pericardial effusion-blood pressure running normal and no evidence for clinical tamponade.  5.  Essential hypertension- BP running on the higher side  6.  End-stage renal disease on hemodialysis    Patient's blood pressure noted to be running on the lower side for which metoprolol and other meds held before dialysis.  Somehow  after dialysis she has been running high blood pressure and heart rate around 100 bpm at rest.  Resume metoprolol      Tremayne Toledo MD  09/07/22  10:59 EDT

## 2022-09-08 LAB
ALBUMIN SERPL-MCNC: 3 G/DL (ref 3.5–5.2)
ANION GAP SERPL CALCULATED.3IONS-SCNC: 11.4 MMOL/L (ref 5–15)
BUN SERPL-MCNC: 26 MG/DL (ref 6–20)
BUN/CREAT SERPL: 8.2 (ref 7–25)
CALCIUM SPEC-SCNC: 8 MG/DL (ref 8.6–10.5)
CHLORIDE SERPL-SCNC: 98 MMOL/L (ref 98–107)
CO2 SERPL-SCNC: 19.6 MMOL/L (ref 22–29)
CREAT SERPL-MCNC: 3.19 MG/DL (ref 0.57–1)
DEPRECATED RDW RBC AUTO: 50 FL (ref 37–54)
EGFRCR SERPLBLD CKD-EPI 2021: 16.5 ML/MIN/1.73
ERYTHROCYTE [DISTWIDTH] IN BLOOD BY AUTOMATED COUNT: 15.9 % (ref 12.3–15.4)
GLUCOSE BLDC GLUCOMTR-MCNC: 115 MG/DL (ref 70–130)
GLUCOSE BLDC GLUCOMTR-MCNC: 197 MG/DL (ref 70–130)
GLUCOSE BLDC GLUCOMTR-MCNC: 239 MG/DL (ref 70–130)
GLUCOSE BLDC GLUCOMTR-MCNC: 81 MG/DL (ref 70–130)
GLUCOSE SERPL-MCNC: 209 MG/DL (ref 65–99)
HCT VFR BLD AUTO: 32.7 % (ref 34–46.6)
HGB BLD-MCNC: 10.2 G/DL (ref 12–15.9)
MCH RBC QN AUTO: 26.8 PG (ref 26.6–33)
MCHC RBC AUTO-ENTMCNC: 31.2 G/DL (ref 31.5–35.7)
MCV RBC AUTO: 86.1 FL (ref 79–97)
PHOSPHATE SERPL-MCNC: 3 MG/DL (ref 2.5–4.5)
PLATELET # BLD AUTO: 127 10*3/MM3 (ref 140–450)
PMV BLD AUTO: 11 FL (ref 6–12)
POTASSIUM SERPL-SCNC: 5.6 MMOL/L (ref 3.5–5.2)
RBC # BLD AUTO: 3.8 10*6/MM3 (ref 3.77–5.28)
SODIUM SERPL-SCNC: 129 MMOL/L (ref 136–145)
WBC NRBC COR # BLD: 9.62 10*3/MM3 (ref 3.4–10.8)

## 2022-09-08 PROCEDURE — 63710000001 PREDNISONE PER 5 MG: Performed by: INTERNAL MEDICINE

## 2022-09-08 PROCEDURE — 99232 SBSQ HOSP IP/OBS MODERATE 35: CPT | Performed by: NURSE PRACTITIONER

## 2022-09-08 PROCEDURE — 63710000001 INSULIN LISPRO (HUMAN) PER 5 UNITS: Performed by: INTERNAL MEDICINE

## 2022-09-08 PROCEDURE — 97530 THERAPEUTIC ACTIVITIES: CPT

## 2022-09-08 PROCEDURE — 85027 COMPLETE CBC AUTOMATED: CPT | Performed by: INTERNAL MEDICINE

## 2022-09-08 PROCEDURE — 80069 RENAL FUNCTION PANEL: CPT | Performed by: INTERNAL MEDICINE

## 2022-09-08 PROCEDURE — 25010000002 ONDANSETRON PER 1 MG: Performed by: INTERNAL MEDICINE

## 2022-09-08 PROCEDURE — 82962 GLUCOSE BLOOD TEST: CPT

## 2022-09-08 RX ORDER — CLONIDINE HYDROCHLORIDE 0.1 MG/1
0.2 TABLET ORAL EVERY 12 HOURS SCHEDULED
Status: DISCONTINUED | OUTPATIENT
Start: 2022-09-08 | End: 2022-09-13 | Stop reason: HOSPADM

## 2022-09-08 RX ORDER — HYDRALAZINE HYDROCHLORIDE 25 MG/1
25 TABLET, FILM COATED ORAL EVERY 8 HOURS SCHEDULED
Status: DISCONTINUED | OUTPATIENT
Start: 2022-09-08 | End: 2022-09-10

## 2022-09-08 RX ORDER — METOPROLOL TARTRATE 50 MG/1
100 TABLET, FILM COATED ORAL EVERY 12 HOURS SCHEDULED
Status: DISCONTINUED | OUTPATIENT
Start: 2022-09-08 | End: 2022-09-13 | Stop reason: HOSPADM

## 2022-09-08 RX ORDER — LEVOTHYROXINE SODIUM 0.12 MG/1
250 TABLET ORAL
Status: DISCONTINUED | OUTPATIENT
Start: 2022-09-09 | End: 2022-09-13 | Stop reason: HOSPADM

## 2022-09-08 RX ORDER — LEVOTHYROXINE SODIUM 0.05 MG/1
50 TABLET ORAL ONCE
Status: COMPLETED | OUTPATIENT
Start: 2022-09-08 | End: 2022-09-08

## 2022-09-08 RX ADMIN — INSULIN LISPRO 2 UNITS: 100 INJECTION, SOLUTION INTRAVENOUS; SUBCUTANEOUS at 09:54

## 2022-09-08 RX ADMIN — HYDROXYZINE HYDROCHLORIDE 25 MG: 25 TABLET ORAL at 02:42

## 2022-09-08 RX ADMIN — PREDNISONE 5 MG: 10 TABLET ORAL at 08:43

## 2022-09-08 RX ADMIN — MICONAZOLE NITRATE 1 APPLICATION: 20 CREAM TOPICAL at 15:37

## 2022-09-08 RX ADMIN — Medication 1 MG: at 08:43

## 2022-09-08 RX ADMIN — INSULIN GLARGINE-YFGN 5 UNITS: 100 INJECTION, SOLUTION SUBCUTANEOUS at 06:16

## 2022-09-08 RX ADMIN — APIXABAN 5 MG: 5 TABLET, FILM COATED ORAL at 08:43

## 2022-09-08 RX ADMIN — METOPROLOL TARTRATE 100 MG: 50 TABLET, FILM COATED ORAL at 21:18

## 2022-09-08 RX ADMIN — HYDRALAZINE HYDROCHLORIDE 25 MG: 50 TABLET, FILM COATED ORAL at 15:35

## 2022-09-08 RX ADMIN — CLONIDINE HYDROCHLORIDE 0.2 MG: 0.1 TABLET ORAL at 21:17

## 2022-09-08 RX ADMIN — APIXABAN 5 MG: 5 TABLET, FILM COATED ORAL at 21:18

## 2022-09-08 RX ADMIN — ROPINIROLE HYDROCHLORIDE 0.75 MG: 0.5 TABLET, FILM COATED ORAL at 21:18

## 2022-09-08 RX ADMIN — GABAPENTIN 100 MG: 100 CAPSULE ORAL at 11:38

## 2022-09-08 RX ADMIN — PANTOPRAZOLE SODIUM 40 MG: 40 TABLET, DELAYED RELEASE ORAL at 11:38

## 2022-09-08 RX ADMIN — HYDRALAZINE HYDROCHLORIDE 25 MG: 50 TABLET, FILM COATED ORAL at 05:11

## 2022-09-08 RX ADMIN — LEVOTHYROXINE SODIUM 50 MCG: 0.05 TABLET ORAL at 11:38

## 2022-09-08 RX ADMIN — CLONIDINE HYDROCHLORIDE 0.1 MG: 0.1 TABLET ORAL at 15:35

## 2022-09-08 RX ADMIN — SERTRALINE 150 MG: 100 TABLET, FILM COATED ORAL at 08:43

## 2022-09-08 RX ADMIN — ONDANSETRON 4 MG: 2 INJECTION INTRAMUSCULAR; INTRAVENOUS at 09:27

## 2022-09-08 RX ADMIN — HYDRALAZINE HYDROCHLORIDE 25 MG: 50 TABLET, FILM COATED ORAL at 21:17

## 2022-09-08 RX ADMIN — LOSARTAN POTASSIUM 100 MG: 100 TABLET, FILM COATED ORAL at 08:43

## 2022-09-08 RX ADMIN — METOPROLOL TARTRATE 100 MG: 50 TABLET, FILM COATED ORAL at 08:52

## 2022-09-08 RX ADMIN — CLONIDINE HYDROCHLORIDE 0.1 MG: 0.1 TABLET ORAL at 05:12

## 2022-09-08 RX ADMIN — INSULIN LISPRO 4 UNITS: 100 INJECTION, SOLUTION INTRAVENOUS; SUBCUTANEOUS at 12:31

## 2022-09-08 RX ADMIN — LEVOTHYROXINE SODIUM 200 MCG: 0.1 TABLET ORAL at 05:11

## 2022-09-08 RX ADMIN — SODIUM ZIRCONIUM CYCLOSILICATE 5 G: 5 POWDER, FOR SUSPENSION ORAL at 08:43

## 2022-09-08 RX ADMIN — ASPIRIN 81 MG: 81 TABLET, COATED ORAL at 11:38

## 2022-09-08 NOTE — PLAN OF CARE
Goal Outcome Evaluation:  Plan of Care Reviewed With: patient        Progress: no change  Outcome Evaluation: Continue to monitor for inc dbp.Possible d/c today.

## 2022-09-08 NOTE — PROGRESS NOTES
"    Patient Name: Zaina Martinez  :1965  56 y.o.      Patient Care Team:  Jane Kyle NP as PCP - General (Family Medicine)  Adonis Fraga Jr., MD as Consulting Physician (Hematology and Oncology)    Chief Complaint: atrial flutter    Interval History: HR not well controlled. Atrial flutter 120's when I saw her. Beta blocker increased and she received 100 mg this morning. She doesn't feel well today. C/o nausea.       Objective   Vital Signs  Temp:  [98 °F (36.7 °C)-98.6 °F (37 °C)] 98.3 °F (36.8 °C)  Heart Rate:  [] 100  Resp:  [16-20] 18  BP: (146-156)/() 156/117    Intake/Output Summary (Last 24 hours) at 2022 1047  Last data filed at 2022 0511  Gross per 24 hour   Intake 480 ml   Output 4000 ml   Net -3520 ml     Flowsheet Rows    Flowsheet Row First Filed Value   Admission Height 157.5 cm (62.01\") Documented at 2022 1355   Admission Weight 64.9 kg (143 lb 1.3 oz) Documented at 2022 1355          Physical Exam:   General Appearance:    Alert, cooperative, in no acute distress   Lungs:     Clear to auscultation.  Normal respiratory effort and rate.      Heart:    Regular rhythm and normal rate, normal S1 and S2, no murmurs, gallops or rubs.     Chest Wall:    No abnormalities observed   Abdomen:     Soft, nontender, positive bowel sounds.     Extremities:   no cyanosis, clubbing or edema.  No marked joint deformities.  Adequate musculoskeletal strength.       Results Review:    Results from last 7 days   Lab Units 22  0500   SODIUM mmol/L 129*   POTASSIUM mmol/L 5.6*   CHLORIDE mmol/L 98   CO2 mmol/L 19.6*   BUN mg/dL 26*   CREATININE mg/dL 3.19*   GLUCOSE mg/dL 209*   CALCIUM mg/dL 8.0*         Results from last 7 days   Lab Units 22  0500   WBC 10*3/mm3 9.62   HEMOGLOBIN g/dL 10.2*   HEMATOCRIT % 32.7*   PLATELETS 10*3/mm3 127*         Results from last 7 days   Lab Units 22  0535   MAGNESIUM mg/dL 1.9                   Medication Review:   apixaban, 5 " mg, Oral, Q12H  aspirin, 81 mg, Oral, Daily  cloNIDine, 0.1 mg, Oral, Q12H  epoetin brooke-epbx, 10,000 Units, Subcutaneous, Once per day on Mon Wed Fri  folic acid, 1 mg, Oral, Daily  gabapentin, 100 mg, Oral, Daily With Lunch  hydrALAZINE, 25 mg, Oral, Q8H  insulin glargine, 5 Units, Subcutaneous, QAM  insulin lispro, 0-9 Units, Subcutaneous, TID With Meals  levothyroxine, 200 mcg, Oral, Q AM  losartan, 100 mg, Oral, Q24H  metoprolol tartrate, 100 mg, Oral, Q12H  miconazole, 1 application, Topical, Q12H  pantoprazole, 40 mg, Oral, Daily  predniSONE, 5 mg, Oral, Daily With Breakfast  rOPINIRole, 0.75 mg, Oral, Nightly  sertraline, 150 mg, Oral, Daily  sodium zirconium cyclosilicate, 5 g, Oral, Daily              Assessment & Plan   1. Persistent atrial flutter - rate not well controlled. Agree with increased beta blocker. She has been evaluated by EP in the past who recommends rate control only. She has also had problems with bradycardia in the past. We will follow closely on increased beta blocker. Could add CCB if needed.   2. Shortness of breath - multifactorial appears improved.   3. End stage renal disease on dialysis  4. Pericardial effusion - limited echo on 8/25 with small effusion and no tamponade. No clinical evidence that this has changed.   5. Hypertension - Dr. Lira has already made some changes today.   6. History of stroke - remains on ASA in addition to DOAC.  7. Transient cardiomyopathy, improved on repeat echocardiogram   8. Nausea     RIKA Felipe  [facility]  09/08/22  10:47 EDT

## 2022-09-08 NOTE — PROGRESS NOTES
Still v    [facility] Progress Note      Patient Name: Zaina Martinez  : 1965  MRN: [id number]  Primary Care Physician:  Jane Kyle NP  Date of admission: 9/3/2022    Subjective     Interval History:   Follow up ESRD. Hypertensive all day.  Metoprolol increased this am.  BP better on dialysis yesterday, but went right back up.  Nausea this am.  Zofran helped. HA yesterday, but not now .  Not soa.     Review of Systems:   As noted above    Objective     Vitals:   Temp:  [98 °F (36.7 °C)-98.3 °F (36.8 °C)] 98.2 °F (36.8 °C)  Heart Rate:  [] 101  Resp:  [16-18] 18  BP: (146-164)/() 164/113  Flow (L/min):  [1-2] 1    Intake/Output Summary (Last 24 hours) at 2022 1728  Last data filed at 2022 1300  Gross per 24 hour   Intake 720 ml   Output --   Net 720 ml       Physical Exam:    General Appearance: alert, oriented x 3, no acute distress . Sitting in chair.   Skin: warm and dry  HEENT: oral mucosa normal, nonicteric sclera  Neck: RIJ TDC  Lungs: Clear to auscultation. Unlabored. 2 L NC  Heart: Irreg.  Tachy.    Abdomen: soft, nontender, distended. + bs  : purwik  Extremities: no edema.  Neuro: normal speech and mental status     Scheduled Meds:     apixaban, 5 mg, Oral, Q12H  aspirin, 81 mg, Oral, Daily  cloNIDine, 0.2 mg, Oral, Q12H  epoetin brooke-epbx, 10,000 Units, Subcutaneous, Once per day on   folic acid, 1 mg, Oral, Daily  gabapentin, 100 mg, Oral, Daily With Lunch  hydrALAZINE, 25 mg, Oral, Q8H  insulin glargine, 5 Units, Subcutaneous, QAM  insulin lispro, 0-9 Units, Subcutaneous, TID With Meals  [START ON 2022] levothyroxine, 250 mcg, Oral, Q AM  losartan, 100 mg, Oral, Q24H  metoprolol tartrate, 100 mg, Oral, Q12H  miconazole, 1 application, Topical, Q12H  pantoprazole, 40 mg, Oral, Daily  predniSONE, 5 mg, Oral, Daily With Breakfast  rOPINIRole, 0.75 mg, Oral, Nightly  sertraline, 150 mg, Oral, Daily  sodium zirconium cyclosilicate, 5 g,  Oral, Daily      IV Meds:        Results Reviewed:   I have personally reviewed the results from the time of this admission to 9/8/2022 17:28 EDT     Results from last 7 days   Lab Units 09/08/22  0500 09/07/22  0508 09/06/22  0535   SODIUM mmol/L 129* 129* 134*   POTASSIUM mmol/L 5.6* 5.6* 4.6   CHLORIDE mmol/L 98 99 98   CO2 mmol/L 19.6* 21.0* 27.0   BUN mg/dL 26* 27* 13   CREATININE mg/dL 3.19* 3.22* 2.60*   CALCIUM mg/dL 8.0* 7.4* 7.7*   GLUCOSE mg/dL 209* 257* 141*       Estimated Creatinine Clearance: 18.5 mL/min (A) (by C-G formula based on SCr of 3.19 mg/dL (H)).    Results from last 7 days   Lab Units 09/08/22  0500 09/07/22  0508 09/06/22  0535 09/05/22  0612 09/04/22  0616   MAGNESIUM mg/dL  --   --  1.9 1.8 1.8   PHOSPHORUS mg/dL 3.0 3.4 3.2 4.2 3.6             Results from last 7 days   Lab Units 09/08/22  0500 09/07/22  0508 09/05/22  0612 09/04/22  0616 09/03/22  1357   WBC 10*3/mm3 9.62 9.66 8.43 8.51 10.23   HEMOGLOBIN g/dL 10.2* 9.9* 9.2* 9.5* 10.3*   PLATELETS 10*3/mm3 127* 154 183 170 199             Assessment / Plan     ASSESSMENT:  1. ESRD.  Dialysis MWF. Continue to remove volume .  Correct K and sodium .  2. Aflutter antioagulated.  Rate not controlled well . Dr. Horner addressing .  3. Dialysis catheter thrombus.  PFO.  Chronic AC on eliquis .  4. Acute on chronic systolic heart failure . Volume off with HD  5. Hypothyroid on synthroid .  Rechecked TSH. Still 99. Iincreased replacement .  6. DM2  7. Uncontrolled HTN. Increased metoprolol today as well as clonidine.     PLAN:  1. Dialysis tomorrow.   2. Increase replacement to 250 mcg levothyroxine  3. DW Dr. Weiss.     Ruth Lira MD  09/08/22  17:28 EDT    [facility]  [phone]

## 2022-09-08 NOTE — PLAN OF CARE
Goal Outcome Evaluation:  Plan of Care Reviewed With: (P) patient        Progress: improving  Outcome Evaluation: (P) Pt recieved supine in bed and is agreeable to PT treatment at this time. Pt was SPV for supiune > sit. Pt required extra time to perform STS due to increased nausea. STS was CGA. Pt ambulated 15ft with RW to chair with decreased gait speed and forward flexed posture due to nausea, also limiting distance. Nsg notified of pt request for nausea medication. Pt with possible DC today. Plan to DC home with HH, PT is agreeable to this plan at this time. Pt left in chair with alarm on and all needs in reach.

## 2022-09-08 NOTE — THERAPY TREATMENT NOTE
Patient Name: Zaina Martinez  : 1965    MRN: [id number]                              Today's Date: 2022       Admit Date: 9/3/2022    Visit Dx:     ICD-10-CM ICD-9-CM   1. Acute pulmonary edema ([facility])  J81.0 518.4   2. Hypoxia  R09.02 799.02   3. Hypervolemia, unspecified hypervolemia type  E87.70 276.69   4. ESRD on dialysis ([facility])  N18.6 585.6    Z99.2 V45.11   5. Chronic anemia  D64.9 285.9   6. Hyperglycemia  R73.9 790.29     Patient Active Problem List   Diagnosis   • Renal insufficiency   • Hypertensive disorder   • Hypothyroidism   • Type 2 diabetes mellitus with kidney complication, with long-term current use of insulin ([facility])   • Rheumatoid arthritis ([facility])   • Angioedema   • Esophageal dysmotility   • Anemia   • Medically noncompliant   • Myocardial infarction due to demand ischemia ([facility])   • Enteritis   • PRES (posterior reversible encephalopathy syndrome)   • Urine retention   • Klebsiella infection   • Superficial thrombophlebitis   • Generalized weakness   • ESRD (end stage renal disease) ([facility])   • CAD (coronary artery disease)   • Abnormal urinalysis   • Chronic diastolic CHF (congestive heart failure) ([facility])   • Pyelonephritis   • Calculus of gallbladder with acute on chronic cholecystitis without obstruction   • Pleural effusion on right   • Anemia due to chronic kidney disease, on chronic dialysis ([facility])   • Abnormal findings on diagnostic imaging of other specified body structures   • Acute upper respiratory infection   • Agitation   • Alkaline phosphatase raised   • Casts present in urine   • Cellulitis of toe   • Hip pain   • Community acquired pneumonia   • Depressive disorder   • Diarrhea of presumed infectious origin   • Difficult or painful urination   • Disease due to severe acute respiratory syndrome coronavirus 2 (SARS-CoV-2)   • Dyspnea   • Encounter for follow-up examination after completed treatment for conditions other than malignant neoplasm   • H/O: hypothyroidism   •  Hyperlipidemia   • Hypomagnesemia   • Intractable vomiting with nausea   • Leukocytosis   • Luetscher's syndrome   • Need for influenza vaccination   • Restless legs   • Noncompliance with treatment   • Shoulder pain   • Urinary tract infectious disease   • Metabolic encephalopathy   • Abnormal findings on diagnostic imaging of abdomen   • Status post cholecystectomy   • Hyponatremia   • Leukocytosis   • Acute metabolic encephalopathy   • Encephalopathy, toxic   • Acute CVA (cerebrovascular accident) (HCC)   • Intracranial hemorrhage (HCC)   • Stroke (HCC)   • Abnormal urinalysis   • Diabetic muscle infarction (HCC)   • Other insomnia   • Altered mental status   • History of stroke- R MCA s/p TPA with subsequent ICH with debility   • Heart murmur   • Bradycardia   • Left lower lobe pneumonia   • Metabolic encephalopathy   • Pericardial effusion   • Pleural effusion   • Acute pulmonary edema (HCC)   • Atrial flutter (HCC)   • Chronic systolic CHF (congestive heart failure) (HCC)   • Thrombus of venous dialysis catheter (HCC)     Past Medical History:   Diagnosis Date   • Acute CVA (cerebrovascular accident) (HCC) 5/1/2022   • Acute on chronic diastolic CHF (congestive heart failure) (HCC)    • CAD (coronary artery disease) 12/20/2021   • Diabetes (HCC)    • Disease of thyroid gland    • GERD (gastroesophageal reflux disease)    • Hyperlipidemia 11/30/2018   • Hypertension    • Rheumatoid arthritis (HCC)      Past Surgical History:   Procedure Laterality Date   • CHOLECYSTECTOMY WITH INTRAOPERATIVE CHOLANGIOGRAM N/A 1/10/2022    Procedure: Laparoscopic cholecystectomy with intraoperative cholangiogram;  Surgeon: Ramana Raygoza MD;  Location: [facility];  Service: General;  Laterality: N/A;   • EYE SURGERY     • HYSTERECTOMY     • INSERTION HEMODIALYSIS CATHETER N/A 12/6/2021    Procedure: HEMODIALYSIS CATHETER INSERTION;  Surgeon: Keli Salazar MD;  Location: [facility] 18/19;  Service: Vascular;   Laterality: N/A;   • INSERTION HEMODIALYSIS CATHETER N/A 5/3/2022    Procedure: TUNNELED CATHETER PLACEMENT;  Surgeon: Keli Salazar MD;  Location: [facility] MAIN OR;  Service: Vascular;  Laterality: N/A;   • MUSCLE BIOPSY Left 6/15/2022    Procedure: Left quadriceps muscle biopsy;  Surgeon: Marques Ma MD;  Location: [facility] MAIN OR;  Service: Neurosurgery;  Laterality: Left;      General Information     Row Name 09/08/22 0942          Physical Therapy Time and Intention    Document Type therapy note (daily note) (P)   -TT     Mode of Treatment physical therapy (P)   -TT     Row Name 09/08/22 0942          General Information    Patient Profile Reviewed yes (P)   -TT           User Key  (r) = Recorded By, (t) = Taken By, (c) = Cosigned By    Initials Name Provider Type    TT Yvette Tee, PT Student PT Student               Mobility     Row Name 09/08/22 0942          Bed Mobility    Bed Mobility supine-sit (P)   -TT     Supine-Sit Johnston (Bed Mobility) supervision (P)   -TT     Row Name 09/08/22 0942          Sit-Stand Transfer    Sit-Stand Johnston (Transfers) contact guard (P)   -TT     Assistive Device (Sit-Stand Transfers) walker, front-wheeled (P)   -TT     Comment, (Sit-Stand Transfer) vc for hand placement, increased time to perform task due to nausea (P)   -TT     Row Name 09/08/22 0942          Gait/Stairs (Locomotion)    Johnston Level (Gait) contact guard (P)   -TT     Assistive Device (Gait) walker, front-wheeled (P)   -TT     Distance in Feet (Gait) 15 (P)   -TT     Deviations/Abnormal Patterns (Gait) base of support, narrow;gait speed decreased (P)   -TT     Bilateral Gait Deviations forward flexed posture (P)   -TT     Comment, (Gait/Stairs) pt limited in ambulation distance and speed due to nausea (P)   -TT           User Key  (r) = Recorded By, (t) = Taken By, (c) = Cosigned By    Initials Name Provider Type    TT Yvette eTe, PT Student PT Student                Obj/Interventions    No documentation.                Goals/Plan    No documentation.                Clinical Impression     Row Name 09/08/22 0945          Pain    Pretreatment Pain Rating 0/10 - no pain (P)   -TT     Row Name 09/08/22 0945          Plan of Care Review    Plan of Care Reviewed With patient (P)   -TT     Outcome Evaluation Pt recieved supine in bed and is agreeable to PT treatment at this time. Pt was SPV for supiune > sit. Pt required extra time to perform STS due to increased nausea. STS was CGA. Pt ambulated 15ft with RW to chair with decreased gait speed and forward flexed posture due to nausea, also limiting distance. Nsg notified of pt request for nausea medication. Pt with possible DC today. Plan to DC home with HH, PT is agreeable to this plan at this time. Pt left in chair with alarm on and all needs in reach. (P)   -TT     Row Name 09/08/22 0945          Positioning and Restraints    Pre-Treatment Position in bed (P)   -TT     Post Treatment Position chair (P)   -TT     In Chair notified nsg;reclined;call light within reach;encouraged to call for assist;exit alarm on (P)   -TT           User Key  (r) = Recorded By, (t) = Taken By, (c) = Cosigned By    Initials Name Provider Type    TT Yvette Tee, PT Student PT Student               Outcome Measures     Row Name 09/08/22 0948          How much help from another person do you currently need...    Turning from your back to your side while in flat bed without using bedrails? 4 (P)   -TT     Moving from lying on back to sitting on the side of a flat bed without bedrails? 4 (P)   -TT     Moving to and from a bed to a chair (including a wheelchair)? 3 (P)   -TT     Standing up from a chair using your arms (e.g., wheelchair, bedside chair)? 3 (P)   -TT     Climbing 3-5 steps with a railing? 3 (P)   -TT     To walk in hospital room? 3 (P)   -TT     AM-PAC 6 Clicks Score (PT) 20 (P)   -TT     Highest level of mobility 6 --> Walked 10 steps or  more (P)   -TT           User Key  (r) = Recorded By, (t) = Taken By, (c) = Cosigned By    Initials Name Provider Type    TT Yvette Tee, PT Student PT Student                             Physical Therapy Education                 Title: PT OT SLP Therapies (Done)     Topic: Physical Therapy (Done)     Point: Mobility training (Done)     Learning Progress Summary           Patient Acceptance, E,TB, VU by TT at 9/8/2022 0948    Acceptance, E,TB, VU by TT at 9/7/2022 1402    Acceptance, E,TB, VU by TT at 9/6/2022 1514    Acceptance, E,D, VU,NR by MS at 9/4/2022 1139                   Point: Home exercise program (Done)     Learning Progress Summary           Patient Acceptance, E,D, VU,NR by MS at 9/4/2022 1139                   Point: Body mechanics (Done)     Learning Progress Summary           Patient Acceptance, E,D, VU,NR by MS at 9/4/2022 1139                   Point: Precautions (Done)     Learning Progress Summary           Patient Acceptance, E,D, VU,NR by MS at 9/4/2022 1139                               User Key     Initials Effective Dates Name Provider Type Discipline    MS 06/16/21 -  Mat Baptiste PT Physical Therapist PT    TT 08/30/22 -  Yvette Tee, PT Student PT Student PT              PT Recommendation and Plan     Plan of Care Reviewed With: (P) patient  Progress: improving  Outcome Evaluation: (P) Pt recieved supine in bed and is agreeable to PT treatment at this time. Pt was SPV for supiune > sit. Pt required extra time to perform STS due to increased nausea. STS was CGA. Pt ambulated 15ft with RW to chair with decreased gait speed and forward flexed posture due to nausea, also limiting distance. Nsg notified of pt request for nausea medication. Pt with possible DC today. Plan to DC home with HH, PT is agreeable to this plan at this time. Pt left in chair with alarm on and all needs in reach.     Time Calculation:    PT Charges     Row Name 09/08/22 7464             Time Calculation     Start Time 0846 (P)   -TT      Stop Time 0905 (P)   -TT      Time Calculation (min) 19 min (P)   -TT      PT Received On 09/08/22 (P)   -TT      PT - Next Appointment 09/09/22 (P)   -TT              Time Calculation- PT    Total Timed Code Minutes- PT 19 minute(s) (P)   -TT              Timed Charges    96849 - PT Therapeutic Activity Minutes 19 (P)   -TT              Total Minutes    Timed Charges Total Minutes 19 (P)   -TT       Total Minutes 19 (P)   -TT            User Key  (r) = Recorded By, (t) = Taken By, (c) = Cosigned By    Initials Name Provider Type    TT Yvette Tee, PT Student PT Student              Therapy Charges for Today     Code Description Service Date Service Provider Modifiers Qty    28215924515 HC PT THERAPEUTIC ACT EA 15 MIN 9/7/2022 Yvette Tee, PT Student GP 1    66597362017 HC PT THERAPEUTIC ACT EA 15 MIN 9/8/2022 Yvette Tee, PT Student GP 1          PT G-Codes  Outcome Measure Options: AM-PAC 6 Clicks Daily Activity (OT)  AM-PAC 6 Clicks Score (PT): (P) 20  AM-PAC 6 Clicks Score (OT): 18    Yvette Tee PT Student  9/8/2022

## 2022-09-09 LAB
ALBUMIN SERPL-MCNC: 3.5 G/DL (ref 3.5–5.2)
ANION GAP SERPL CALCULATED.3IONS-SCNC: 9.2 MMOL/L (ref 5–15)
BUN SERPL-MCNC: 38 MG/DL (ref 6–20)
BUN/CREAT SERPL: 9.7 (ref 7–25)
CALCIUM SPEC-SCNC: 8.3 MG/DL (ref 8.6–10.5)
CHLORIDE SERPL-SCNC: 99 MMOL/L (ref 98–107)
CO2 SERPL-SCNC: 24.8 MMOL/L (ref 22–29)
CREAT SERPL-MCNC: 3.93 MG/DL (ref 0.57–1)
EGFRCR SERPLBLD CKD-EPI 2021: 12.8 ML/MIN/1.73
GLUCOSE BLDC GLUCOMTR-MCNC: 127 MG/DL (ref 70–130)
GLUCOSE BLDC GLUCOMTR-MCNC: 249 MG/DL (ref 70–130)
GLUCOSE BLDC GLUCOMTR-MCNC: 395 MG/DL (ref 70–130)
GLUCOSE BLDC GLUCOMTR-MCNC: 57 MG/DL (ref 70–130)
GLUCOSE BLDC GLUCOMTR-MCNC: 86 MG/DL (ref 70–130)
GLUCOSE SERPL-MCNC: 156 MG/DL (ref 65–99)
PHOSPHATE SERPL-MCNC: 4.1 MG/DL (ref 2.5–4.5)
POTASSIUM SERPL-SCNC: 5.8 MMOL/L (ref 3.5–5.2)
SODIUM SERPL-SCNC: 133 MMOL/L (ref 136–145)

## 2022-09-09 PROCEDURE — 99232 SBSQ HOSP IP/OBS MODERATE 35: CPT | Performed by: NURSE PRACTITIONER

## 2022-09-09 PROCEDURE — 80069 RENAL FUNCTION PANEL: CPT | Performed by: INTERNAL MEDICINE

## 2022-09-09 PROCEDURE — 5A1D70Z PERFORMANCE OF URINARY FILTRATION, INTERMITTENT, LESS THAN 6 HOURS PER DAY: ICD-10-PCS | Performed by: INTERNAL MEDICINE

## 2022-09-09 PROCEDURE — 82962 GLUCOSE BLOOD TEST: CPT

## 2022-09-09 PROCEDURE — 97530 THERAPEUTIC ACTIVITIES: CPT

## 2022-09-09 PROCEDURE — 63710000001 INSULIN LISPRO (HUMAN) PER 5 UNITS: Performed by: INTERNAL MEDICINE

## 2022-09-09 PROCEDURE — 25010000002 EPOETIN ALFA-EPBX 10000 UNIT/ML SOLUTION: Performed by: INTERNAL MEDICINE

## 2022-09-09 PROCEDURE — 63710000001 PREDNISONE PER 5 MG: Performed by: INTERNAL MEDICINE

## 2022-09-09 RX ORDER — INSULIN LISPRO 100 [IU]/ML
0-7 INJECTION, SOLUTION INTRAVENOUS; SUBCUTANEOUS
Status: DISCONTINUED | OUTPATIENT
Start: 2022-09-09 | End: 2022-09-13 | Stop reason: HOSPADM

## 2022-09-09 RX ADMIN — SERTRALINE 150 MG: 100 TABLET, FILM COATED ORAL at 13:56

## 2022-09-09 RX ADMIN — HYDRALAZINE HYDROCHLORIDE 25 MG: 50 TABLET, FILM COATED ORAL at 13:56

## 2022-09-09 RX ADMIN — EPOETIN ALFA-EPBX 10000 UNITS: 10000 INJECTION, SOLUTION INTRAVENOUS; SUBCUTANEOUS at 21:34

## 2022-09-09 RX ADMIN — METOPROLOL TARTRATE 100 MG: 50 TABLET, FILM COATED ORAL at 13:55

## 2022-09-09 RX ADMIN — APIXABAN 5 MG: 5 TABLET, FILM COATED ORAL at 13:55

## 2022-09-09 RX ADMIN — DILTIAZEM HYDROCHLORIDE 30 MG: 30 TABLET, FILM COATED ORAL at 21:34

## 2022-09-09 RX ADMIN — PREDNISONE 5 MG: 10 TABLET ORAL at 13:55

## 2022-09-09 RX ADMIN — HYDRALAZINE HYDROCHLORIDE 25 MG: 50 TABLET, FILM COATED ORAL at 21:32

## 2022-09-09 RX ADMIN — METOPROLOL TARTRATE 100 MG: 50 TABLET, FILM COATED ORAL at 21:33

## 2022-09-09 RX ADMIN — HYDRALAZINE HYDROCHLORIDE 25 MG: 50 TABLET, FILM COATED ORAL at 06:28

## 2022-09-09 RX ADMIN — LEVOTHYROXINE SODIUM 250 MCG: 0.12 TABLET ORAL at 06:28

## 2022-09-09 RX ADMIN — PANTOPRAZOLE SODIUM 40 MG: 40 TABLET, DELAYED RELEASE ORAL at 13:56

## 2022-09-09 RX ADMIN — SODIUM ZIRCONIUM CYCLOSILICATE 5 G: 5 POWDER, FOR SUSPENSION ORAL at 16:25

## 2022-09-09 RX ADMIN — ASPIRIN 81 MG: 81 TABLET, COATED ORAL at 13:56

## 2022-09-09 RX ADMIN — CLONIDINE HYDROCHLORIDE 0.2 MG: 0.1 TABLET ORAL at 06:28

## 2022-09-09 RX ADMIN — ROPINIROLE HYDROCHLORIDE 0.75 MG: 0.5 TABLET, FILM COATED ORAL at 21:33

## 2022-09-09 RX ADMIN — INSULIN GLARGINE-YFGN 5 UNITS: 100 INJECTION, SOLUTION SUBCUTANEOUS at 06:28

## 2022-09-09 RX ADMIN — CLONIDINE HYDROCHLORIDE 0.2 MG: 0.1 TABLET ORAL at 16:27

## 2022-09-09 RX ADMIN — LOSARTAN POTASSIUM 100 MG: 100 TABLET, FILM COATED ORAL at 13:56

## 2022-09-09 RX ADMIN — Medication 1 MG: at 13:55

## 2022-09-09 RX ADMIN — GABAPENTIN 100 MG: 100 CAPSULE ORAL at 13:56

## 2022-09-09 RX ADMIN — APIXABAN 5 MG: 5 TABLET, FILM COATED ORAL at 21:33

## 2022-09-09 RX ADMIN — INSULIN LISPRO 3 UNITS: 100 INJECTION, SOLUTION INTRAVENOUS; SUBCUTANEOUS at 17:59

## 2022-09-09 NOTE — SIGNIFICANT NOTE
09/09/22 0822   OTHER   Discipline physical therapist   Rehab Time/Intention   Session Not Performed patient unavailable for treatment  (pt in dialysis this am, will follow up as time allows)   Recommendation   PT - Next Appointment 09/10/22

## 2022-09-09 NOTE — PROGRESS NOTES
[facility] Progress Note      Patient Name: Zaina Martinez  : 1965  MRN: [id number]  Primary Care Physician:  Jane Kyle NP  Date of admission: 9/3/2022    Subjective     Interval History:   F/u ESRD     Review of Systems:   Seen on dialysis, /100 roughly and UFG set for 4.1L  Denies dyspnea and asks about going home    Objective     Vitals:   Temp:  [98 °F (36.7 °C)-98.8 °F (37.1 °C)] 98 °F (36.7 °C)  Heart Rate:  [101-116] 111  Resp:  [18] 18  BP: (133-164)/() 152/107  Flow (L/min):  [1-2] 2    Intake/Output Summary (Last 24 hours) at 2022 0835  Last data filed at 2022 1700  Gross per 24 hour   Intake 380 ml   Output --   Net 380 ml       Physical Exam:    General Appearance: frail WF on dialysis, comfortable  Neck: RIJ TDC in use, no JVD  Lungs: CTA bilat no rales  Heart: RRR, normal S1 and S2  Abdomen: soft, nontender, nondistended  Extremities: no edema, cyanosis or clubbing    Scheduled Meds:     apixaban, 5 mg, Oral, Q12H  aspirin, 81 mg, Oral, Daily  cloNIDine, 0.2 mg, Oral, Q12H  epoetin brooke-epbx, 10,000 Units, Subcutaneous, Once per day on   folic acid, 1 mg, Oral, Daily  gabapentin, 100 mg, Oral, Daily With Lunch  hydrALAZINE, 25 mg, Oral, Q8H  insulin glargine, 5 Units, Subcutaneous, QAM  insulin lispro, 0-9 Units, Subcutaneous, TID With Meals  levothyroxine, 250 mcg, Oral, Q AM  losartan, 100 mg, Oral, Q24H  metoprolol tartrate, 100 mg, Oral, Q12H  miconazole, 1 application, Topical, Q12H  pantoprazole, 40 mg, Oral, Daily  predniSONE, 5 mg, Oral, Daily With Breakfast  rOPINIRole, 0.75 mg, Oral, Nightly  sertraline, 150 mg, Oral, Daily  sodium zirconium cyclosilicate, 5 g, Oral, Daily      IV Meds:        Results Reviewed:   I have personally reviewed the results from the time of this admission to 2022 08:35 EDT     Results from last 7 days   Lab Units 22  0504 22  0500 22  0508   SODIUM mmol/L 133* 129* 129*    POTASSIUM mmol/L 5.8* 5.6* 5.6*   CHLORIDE mmol/L 99 98 99   CO2 mmol/L 24.8 19.6* 21.0*   BUN mg/dL 38* 26* 27*   CREATININE mg/dL 3.93* 3.19* 3.22*   CALCIUM mg/dL 8.3* 8.0* 7.4*   GLUCOSE mg/dL 156* 209* 257*     Estimated Creatinine Clearance: 14 mL/min (A) (by C-G formula based on SCr of 3.93 mg/dL (H)).  Results from last 7 days   Lab Units 09/09/22  0504 09/08/22  0500 09/07/22  0508 09/06/22  0535 09/05/22  0612 09/04/22  0616   MAGNESIUM mg/dL  --   --   --  1.9 1.8 1.8   PHOSPHORUS mg/dL 4.1 3.0 3.4 3.2 4.2 3.6         Results from last 7 days   Lab Units 09/08/22  0500 09/07/22  0508 09/05/22  0612 09/04/22  0616 09/03/22  1357   WBC 10*3/mm3 9.62 9.66 8.43 8.51 10.23   HEMOGLOBIN g/dL 10.2* 9.9* 9.2* 9.5* 10.3*   PLATELETS 10*3/mm3 127* 154 183 170 199           Assessment / Plan     ASSESSMENT:  1. ESRD.  Dialysis MWF. Mild vol excess.  K remains generous 5.8, on lokelma daily.  RIJ TDC .  2. Aflutter antioagulated.  Cardiology following.  HR low 100s.  Note may add CCB  3. Dialysis catheter thrombus.  PFO.  Chronic AC on eliquis .  4. Acute on chronic systolic heart failure . Volume off with HD  5. HTN - BP elevated still, on hefty regimen, HR low 100s despite metop 100 BID & clonidine 0.2 BID (on hydralazine low dose).  Metop & clonidine inc'd yesterday  6. DM2  7. Anemia of CKD, hgb stable on epo    PLAN:  HD today in progress, remove 3.5L as tolerated, d/w dialysis RN   Note possible addition of diltiazem, defer to cardiology       Frederic Powell MD  09/09/22  08:35 EDT    [facility]  [phone]

## 2022-09-09 NOTE — PROGRESS NOTES
"    Patient Name: Zaina Martinez  :1965  56 y.o.      Patient Care Team:  Jane Kyle NP as PCP - General (Family Medicine)  Adonis Fraga Jr., MD as Consulting Physician (Hematology and Oncology)    Chief Complaint: atrial flutter    Interval History: I saw her in dialysis. HR 65.       Objective   Vital Signs  Temp:  [98 °F (36.7 °C)-98.8 °F (37.1 °C)] 98 °F (36.7 °C)  Heart Rate:  [101-116] 111  Resp:  [18] 18  BP: (133-164)/() 152/107    Intake/Output Summary (Last 24 hours) at 2022 1055  Last data filed at 2022 1700  Gross per 24 hour   Intake 380 ml   Output --   Net 380 ml     Flowsheet Rows    Flowsheet Row First Filed Value   Admission Height 157.5 cm (62.01\") Documented at 2022 1355   Admission Weight 64.9 kg (143 lb 1.3 oz) Documented at 2022 1355          Physical Exam:   General Appearance:    Alert, cooperative, in no acute distress   Lungs:     Clear to auscultation.  Normal respiratory effort and rate.      Heart:    Regular rhythm and normal rate, normal S1 and S2, no murmurs, gallops or rubs.     Chest Wall:    No abnormalities observed   Abdomen:     Soft, nontender, positive bowel sounds.     Extremities:   no cyanosis, clubbing or edema.  No marked joint deformities.  Adequate musculoskeletal strength.       Results Review:    Results from last 7 days   Lab Units 22  0504   SODIUM mmol/L 133*   POTASSIUM mmol/L 5.8*   CHLORIDE mmol/L 99   CO2 mmol/L 24.8   BUN mg/dL 38*   CREATININE mg/dL 3.93*   GLUCOSE mg/dL 156*   CALCIUM mg/dL 8.3*         Results from last 7 days   Lab Units 22  0500   WBC 10*3/mm3 9.62   HEMOGLOBIN g/dL 10.2*   HEMATOCRIT % 32.7*   PLATELETS 10*3/mm3 127*         Results from last 7 days   Lab Units 22  0535   MAGNESIUM mg/dL 1.9                   Medication Review:   apixaban, 5 mg, Oral, Q12H  aspirin, 81 mg, Oral, Daily  cloNIDine, 0.2 mg, Oral, Q12H  epoetin brooke-epbx, 10,000 Units, Subcutaneous, Once per day on Mon " Wed Fri  folic acid, 1 mg, Oral, Daily  gabapentin, 100 mg, Oral, Daily With Lunch  hydrALAZINE, 25 mg, Oral, Q8H  insulin glargine, 5 Units, Subcutaneous, QAM  insulin lispro, 0-9 Units, Subcutaneous, TID With Meals  levothyroxine, 250 mcg, Oral, Q AM  losartan, 100 mg, Oral, Q24H  metoprolol tartrate, 100 mg, Oral, Q12H  miconazole, 1 application, Topical, Q12H  pantoprazole, 40 mg, Oral, Daily  predniSONE, 5 mg, Oral, Daily With Breakfast  rOPINIRole, 0.75 mg, Oral, Nightly  sertraline, 150 mg, Oral, Daily  sodium zirconium cyclosilicate, 5 g, Oral, Daily              Assessment & Plan   1. Persistent atrial flutter - rate not well controlled. Agree with increased beta blocker. She has been evaluated by EP in the past who recommends rate control only. She has also had problems with bradycardia in the past. We will follow closely on increased beta blocker. Could add CCB if needed.   2. Shortness of breath - multifactorial appears improved.   3. End stage renal disease on dialysis  4. Pericardial effusion - limited echo on 8/25 with small effusion and no tamponade. No clinical evidence that this has changed.   5. Hypertension - Dr. Lira has already made some changes today.   6. History of stroke - remains on ASA in addition to DOAC.  7. Transient cardiomyopathy, improved on repeat echocardiogram   8. Nausea     HR stable in the 60's while on dialysis. Continue current dose of beta blocker.     If HR goes back up to 100's will add diltiazem at that time but HR looks ok at the moment.     RIKA Felipe  [facility]  09/09/22  10:55 EDT

## 2022-09-09 NOTE — THERAPY TREATMENT NOTE
Patient Name: Zaina Martinez  : 1965    MRN: [id number]                              Today's Date: 2022       Admit Date: 9/3/2022    Visit Dx:     ICD-10-CM ICD-9-CM   1. Acute pulmonary edema ([facility])  J81.0 518.4   2. Hypoxia  R09.02 799.02   3. Hypervolemia, unspecified hypervolemia type  E87.70 276.69   4. ESRD on dialysis ([facility])  N18.6 585.6    Z99.2 V45.11   5. Chronic anemia  D64.9 285.9   6. Hyperglycemia  R73.9 790.29     Patient Active Problem List   Diagnosis   • Renal insufficiency   • Hypertensive disorder   • Hypothyroidism   • Type 2 diabetes mellitus with kidney complication, with long-term current use of insulin ([facility])   • Rheumatoid arthritis ([facility])   • Angioedema   • Esophageal dysmotility   • Anemia   • Medically noncompliant   • Myocardial infarction due to demand ischemia ([facility])   • Enteritis   • PRES (posterior reversible encephalopathy syndrome)   • Urine retention   • Klebsiella infection   • Superficial thrombophlebitis   • Generalized weakness   • ESRD (end stage renal disease) ([facility])   • CAD (coronary artery disease)   • Abnormal urinalysis   • Chronic diastolic CHF (congestive heart failure) ([facility])   • Pyelonephritis   • Calculus of gallbladder with acute on chronic cholecystitis without obstruction   • Pleural effusion on right   • Anemia due to chronic kidney disease, on chronic dialysis ([facility])   • Abnormal findings on diagnostic imaging of other specified body structures   • Acute upper respiratory infection   • Agitation   • Alkaline phosphatase raised   • Casts present in urine   • Cellulitis of toe   • Hip pain   • Community acquired pneumonia   • Depressive disorder   • Diarrhea of presumed infectious origin   • Difficult or painful urination   • Disease due to severe acute respiratory syndrome coronavirus 2 (SARS-CoV-2)   • Dyspnea   • Encounter for follow-up examination after completed treatment for conditions other than malignant neoplasm   • H/O: hypothyroidism   •  Hyperlipidemia   • Hypomagnesemia   • Intractable vomiting with nausea   • Leukocytosis   • Luetscher's syndrome   • Need for influenza vaccination   • Restless legs   • Noncompliance with treatment   • Shoulder pain   • Urinary tract infectious disease   • Metabolic encephalopathy   • Abnormal findings on diagnostic imaging of abdomen   • Status post cholecystectomy   • Hyponatremia   • Leukocytosis   • Acute metabolic encephalopathy   • Encephalopathy, toxic   • Acute CVA (cerebrovascular accident) ([facility])   • Intracranial hemorrhage ([facility]CC)   • Stroke (HCC)   • Abnormal urinalysis   • Diabetic muscle infarction (HCC)   • Other insomnia   • Altered mental status   • History of stroke- R MCA s/p TPA with subsequent ICH with debility   • Heart murmur   • Bradycardia   • Left lower lobe pneumonia   • Metabolic encephalopathy   • Pericardial effusion   • Pleural effusion   • Acute pulmonary edema (HCC)   • Atrial flutter (HCC)   • Chronic systolic CHF (congestive heart failure) (HCC)   • Thrombus of venous dialysis catheter (HCC)     Past Medical History:   Diagnosis Date   • Acute CVA (cerebrovascular accident) (HCC) 5/1/2022   • Acute on chronic diastolic CHF (congestive heart failure) (HCC)    • CAD (coronary artery disease) 12/20/2021   • Diabetes (HCC)    • Disease of thyroid gland    • GERD (gastroesophageal reflux disease)    • Hyperlipidemia 11/30/2018   • Hypertension    • Rheumatoid arthritis (HCC)      Past Surgical History:   Procedure Laterality Date   • CHOLECYSTECTOMY WITH INTRAOPERATIVE CHOLANGIOGRAM N/A 1/10/2022    Procedure: Laparoscopic cholecystectomy with intraoperative cholangiogram;  Surgeon: Ramana Raygoza MD;  Location: [facility];  Service: General;  Laterality: N/A;   • EYE SURGERY     • HYSTERECTOMY     • INSERTION HEMODIALYSIS CATHETER N/A 12/6/2021    Procedure: HEMODIALYSIS CATHETER INSERTION;  Surgeon: Keli Salazar MD;  Location: [facility] 18/19;  Service: Vascular;   Laterality: N/A;   • INSERTION HEMODIALYSIS CATHETER N/A 5/3/2022    Procedure: TUNNELED CATHETER PLACEMENT;  Surgeon: Keli Salazar MD;  Location: [facility] MAIN OR;  Service: Vascular;  Laterality: N/A;   • MUSCLE BIOPSY Left 6/15/2022    Procedure: Left quadriceps muscle biopsy;  Surgeon: Marques Ma MD;  Location: [facility] MAIN OR;  Service: Neurosurgery;  Laterality: Left;      General Information     Row Name 09/09/22 1510          OT Time and Intention    Document Type therapy note (daily note)  ok for therapy per RN>  -LE     Mode of Treatment occupational therapy;individual therapy  -LE     Row Name 09/09/22 1510          General Information    Existing Precautions/Restrictions fall  -LE     Row Name 09/09/22 1510          Cognition    Orientation Status (Cognition) oriented x 4  -LE     Row Name 09/09/22 1510          Safety Issues, Functional Mobility    Comment, Safety Issues/Impairments (Mobility) no skid socks and gait belt  -LE           User Key  (r) = Recorded By, (t) = Taken By, (c) = Cosigned By    Initials Name Provider Type    Liz Oates OTR Occupational Therapist                 Mobility/ADL's     Row Name 09/09/22 1514          Bed Mobility    Supine-Sit Marcell (Bed Mobility) contact guard  -LE     Assistive Device (Bed Mobility) bed rails;head of bed elevated  -     Row Name 09/09/22 1514          Transfers    Transfers stand-sit transfer;sit-stand transfer;bed-chair transfer  -LE     Bed-Chair Marcell (Transfers) contact guard  -LE     Assistive Device (Bed-Chair Transfers) walker, front-wheeled  -LE     Sit-Stand Marcell (Transfers) contact guard;verbal cues  -LE     Stand-Sit Marcell (Transfers) contact guard;verbal cues  -LE     Row Name 09/09/22 1514          Sit-Stand Transfer    Assistive Device (Sit-Stand Transfers) walker, front-wheeled  -LE     Row Name 09/09/22 1514          Stand-Sit Transfer    Assistive Device (Stand-Sit Transfers) walker,  "front-wheeled  -LE     Row Name 09/09/22 1514          Functional Mobility    Functional Mobility- Ind. Level contact guard assist;verbal cues required  -LE     Functional Mobility- Device walker, front-wheeled  -LE     Functional Mobility-Distance (Feet) --  bed around to chair.  -LE     Row Name 09/09/22 1514          Activities of Daily Living    BADL Assessment/Intervention toileting  -     Row Name 09/09/22 1514          Toileting Assessment/Training    [place] Level (Toileting) dependent (less than 25% patient effort)  -LE     Comment, (Toileting) [place].  -LE           User Key  (r) = Recorded By, (t) = Taken By, (c) = Cosigned By    Initials Name Provider Type    Liz Oates OTR Occupational Therapist               Obj/Interventions     Row Name 09/09/22 1515          Balance    Balance Assessment standing dynamic balance  -LE     Static Sitting Balance standby assist  -LE     Static Standing Balance standby assist  -LE     Dynamic Standing Balance contact guard  -LE     Position/Device Used, Standing Balance walker, rolling  -JAIME           User Key  (r) = Recorded By, (t) = Taken By, (c) = Cosigned By    Initials Name Provider Type    Liz Oates OTR Occupational Therapist               Goals/Plan    No documentation.                Clinical Impression     Row Name 09/09/22 1511          Pain Assessment    Pretreatment Pain Rating 0/10 - no pain  -[facility] Name 09/09/22 1511          Plan of Care Review    Plan of Care Reviewed With patient  -LE     Outcome Evaluation Pt recently back from dialysis but agrees to get OOB and sit up in chair.  Pt move from supine to sit with CGA and then walks around bed to recliner. Pt reports \"feels good\" to sit up.  Ed benefit of activity and ed posture with walker  -JAIME     Row Name 09/09/22 1511          Vital Signs    O2 Delivery Pre Treatment supplemental O2  -LE     O2 Delivery Intra Treatment supplemental O2  -LE     O2 Delivery Post Treatment " supplemental O2  -LE     Pre Patient Position Supine  -LE     Intra Patient Position Standing  -LE     Post Patient Position Sitting  -LE     Row Name 09/09/22 1511          Positioning and Restraints    Pre-Treatment Position in bed  -LE     Post Treatment Position chair  -LE     In Chair notified nsg;reclined;call light within reach;exit alarm on;encouraged to call for assist  -LE           User Key  (r) = Recorded By, (t) = Taken By, (c) = Cosigned By    Initials Name Provider Type    Liz Oates OTR Occupational Therapist               Outcome Measures     Row Name 09/09/22 1516          How much help from another is currently needed...    Putting on and taking off regular lower body clothing? 2  -LE     Bathing (including washing, rinsing, and drying) 2  -LE     Toileting (which includes using toilet bed pan or urinal) 1  -LE     Putting on and taking off regular upper body clothing 3  -LE     Taking care of personal grooming (such as brushing teeth) 3  -LE     Eating meals 4  -LE     AM-PAC 6 Clicks Score (OT) 15  -LE     Row Name 09/09/22 1516          Functional Assessment    Outcome Measure Options AM-PAC 6 Clicks Daily Activity (OT)  -LE           User Key  (r) = Recorded By, (t) = Taken By, (c) = Cosigned By    Initials Name Provider Type    Liz Oates OTR Occupational Therapist                Occupational Therapy Education                 Title: PT OT SLP Therapies (Done)     Topic: Occupational Therapy (Done)     Point: ADL training (Done)     Description:   Instruct learner(s) on proper safety adaptation and remediation techniques during self care or transfers.   Instruct in proper use of assistive devices.              Learning Progress Summary           Patient Acceptance, E, VU by RB at 9/8/2022 1815    Acceptance, E,TB,D, VU,DU by  at 9/6/2022 1315    Comment: ed pt on trying 2# hand wt at home, pt not new to OT and understands OT role as she was on rehab for quite some time. pt  recalls this OT. pt reports she has 2 and 3# hand wts at home now.                   Point: Home exercise program (Done)     Description:   Instruct learner(s) on appropriate technique for monitoring, assisting and/or progressing therapeutic exercises/activities.              Learning Progress Summary           Patient Acceptance, E, VU by RB at 9/8/2022 1815    Acceptance, E,TB,D, VU,DU by  at 9/6/2022 1315    Comment: ed pt on trying 2# hand wt at home, pt not new to OT and understands OT role as she was on rehab for quite some time. pt recalls this OT. pt reports she has 2 and 3# hand wts at home now.                   Point: Precautions (Done)     Description:   Instruct learner(s) on prescribed precautions during self-care and functional transfers.              Learning Progress Summary           Patient Acceptance, E, VU by RB at 9/8/2022 1815    Acceptance, E,TB,D, VU,DU by  at 9/6/2022 1315    Comment: ed pt on trying 2# hand wt at home, pt not new to OT and understands OT role as she was on rehab for quite some time. pt recalls this OT. pt reports she has 2 and 3# hand wts at home now.                   Point: Body mechanics (Done)     Description:   Instruct learner(s) on proper positioning and spine alignment during self-care, functional mobility activities and/or exercises.              Learning Progress Summary           Patient Acceptance, E, VU by RB at 9/8/2022 1815    Acceptance, E,TB,D, VU,DU by  at 9/6/2022 1315    Comment: ed pt on trying 2# hand wt at home, pt not new to OT and understands OT role as she was on rehab for quite some time. pt recalls this OT. pt reports she has 2 and 3# hand wts at home now.                               User Key     Initials Effective Dates Name Provider Type Discipline    RODRIGUE 06/16/21 -  Estefany Diamond OTR Occupational Therapist OT    RB 01/18/17 -  Suhas Hill RN Registered Nurse Nurse              OT Recommendation and Plan     Plan of  "Care Review  Plan of Care Reviewed With: patient  Outcome Evaluation: Pt recently back from dialysis but agrees to get OOB and sit up in chair.  Pt move from supine to sit with CGA and then walks around bed to recliner. Pt reports \"feels good\" to sit up.  Ed benefit of activity and ed posture with walker     Time Calculation:    Time Calculation- OT     Row Name 09/09/22 1517 09/09/22 0817          Time Calculation- OT    OT Start Time 1452  -LE --     OT Stop Time 1508  -LE --     OT Time Calculation (min) 16 min  -LE --     Total Timed Code Minutes- OT 16 minute(s)  -LE --     OT Received On 09/09/22  -LE --     OT - Next Appointment 09/12/22  -LE 09/12/22  -LE            Timed Charges    60770 - OT Therapeutic Activity Minutes 16  -LE --            Total Minutes    Timed Charges Total Minutes 16  -LE --      Total Minutes 16  -LE --           User Key  (r) = Recorded By, (t) = Taken By, (c) = Cosigned By    Initials Name Provider Type    Liz Oates OTR Occupational Therapist              Therapy Charges for Today     Code Description Service Date Service Provider Modifiers Qty    [id number]  OT THERAPEUTIC ACT EA 15 MIN 9/9/2022 Liz Liu OTR GO 1               RAMO [place]  9/9/2022  "

## 2022-09-09 NOTE — PROGRESS NOTES
Name: Zaina Martinez ADMIT: 9/3/2022   : 1965  PCP: Jane Kyle NP    MRN: [id number] LOS: 1 days   AGE/SEX: 56 y.o. female  ROOM: [facility]   Subjective   Chief Complaint   Patient presents with   • Shortness of Breath      Saw in HD. No CP SOA NVD reported. On supplemental O2. I was called later this morning about hypoglycemia. Have stopped the long acting insulin and change to low dose ssi.    Objective   Vital Signs  Temp:  [97.2 °F (36.2 °C)-98.8 °F (37.1 °C)] 97.8 °F (36.6 °C)  Heart Rate:  [] 65  Resp:  [18] 18  BP: (133-174)/() 174/98  SpO2:  [94 %-98 %] 98 %  on  Flow (L/min):  [1-2] 2;   Device (Oxygen Therapy): nasal cannula  Body mass index is 22.33 kg/m².    Physical Exam  Vitals and nursing note reviewed.   Constitutional:       Appearance: She is ill-appearing (chronically). She is not diaphoretic.   HENT:      Head: Normocephalic and atraumatic.   Eyes:      General:         Right eye: No discharge.         Left eye: No discharge.      Conjunctiva/sclera: Conjunctivae normal.   Cardiovascular:      Rate and Rhythm: Normal rate. Rhythm irregular.   Pulmonary:      Effort: Pulmonary effort is normal.      Breath sounds: Decreased breath sounds present. No wheezing.   Abdominal:      General: There is no distension.      Palpations: Abdomen is soft.      Tenderness: There is no abdominal tenderness. There is no guarding or rebound.   Musculoskeletal:         General: No swelling or tenderness.      Cervical back: Neck supple. No tenderness.   Skin:     General: Skin is warm and dry.   Neurological:      Mental Status: She is alert. Mental status is at baseline.   Psychiatric:         Mood and Affect: Mood normal.         Behavior: Behavior normal.       I have reviewed the physical exam today and updated where needed as above.          Results Review:       I reviewed the patient's new clinical results.      Results from last 7 days   Lab Units 22  0500 22  0502  09/05/22  0612 09/04/22  0616   WBC 10*3/mm3 9.62 9.66 8.43 8.51   HEMOGLOBIN g/dL 10.2* 9.9* 9.2* 9.5*   PLATELETS 10*3/mm3 127* 154 183 170     Results from last 7 days   Lab Units 09/09/22  0504 09/08/22  0500 09/07/22  0508 09/06/22  0535   SODIUM mmol/L 133* 129* 129* 134*   POTASSIUM mmol/L 5.8* 5.6* 5.6* 4.6   CHLORIDE mmol/L 99 98 99 98   CO2 mmol/L 24.8 19.6* 21.0* 27.0   BUN mg/dL 38* 26* 27* 13   CREATININE mg/dL 3.93* 3.19* 3.22* 2.60*   GLUCOSE mg/dL 156* 209* 257* 141*   Estimated Creatinine Clearance: 14 mL/min (A) (by C-G formula based on SCr of 3.93 mg/dL (H)).  Results from last 7 days   Lab Units 09/09/22  0504 09/08/22  0500 09/07/22  0508 09/06/22  0535 09/05/22  0612 09/04/22  0616   CALCIUM mg/dL 8.3* 8.0* 7.4* 7.7* 7.8* 7.8*   ALBUMIN g/dL 3.50 3.00* 2.90* 3.30* 2.90* 2.90*   MAGNESIUM mg/dL  --   --   --  1.9 1.8 1.8   PHOSPHORUS mg/dL 4.1 3.0 3.4 3.2 4.2 3.6          apixaban, 5 mg, Oral, Q12H  aspirin, 81 mg, Oral, Daily  cloNIDine, 0.2 mg, Oral, Q12H  epoetin brooke-epbx, 10,000 Units, Subcutaneous, Once per day on Mon Wed Fri  folic acid, 1 mg, Oral, Daily  gabapentin, 100 mg, Oral, Daily With Lunch  hydrALAZINE, 25 mg, Oral, Q8H  insulin lispro, 0-7 Units, Subcutaneous, TID AC  levothyroxine, 250 mcg, Oral, Q AM  losartan, 100 mg, Oral, Q24H  metoprolol tartrate, 100 mg, Oral, Q12H  miconazole, 1 application, Topical, Q12H  pantoprazole, 40 mg, Oral, Daily  predniSONE, 5 mg, Oral, Daily With Breakfast  rOPINIRole, 0.75 mg, Oral, Nightly  sertraline, 150 mg, Oral, Daily  sodium zirconium cyclosilicate, 5 g, Oral, Daily       Diet Regular; Cardiac    Assessment & Plan      Active Hospital Problems    Diagnosis  POA   • **Acute pulmonary edema (HCC) [J81.0]  Yes   • Atrial flutter (HCC) [I48.92]  Yes   • Chronic systolic CHF (congestive heart failure) (HCC) [I50.22]  Yes   • Thrombus of venous dialysis catheter ([facility]) [T82.528A]  Yes   • Anemia due to chronic kidney disease, on chronic  dialysis ([facility]) [N18.6, D63.1, Z99.2]  Not Applicable   • CAD (coronary artery disease) [I25.10]  Yes   • ESRD (end stage renal disease) ([facility]) [N18.6]  Yes   • Hypothyroidism [E03.9]  Yes   • Type 2 diabetes mellitus with kidney complication, with long-term current use of insulin ([facility]) [E11.29, Z79.4]  Not Applicable      Resolved Hospital Problems   No resolved problems to display.       · Acute Pulmonary Edema: Volume removal with HD. Wean O2 as tolerated. Nephrology following.  · ESRD  · Atrial Flutter: Metoprolol. Diltiazem stopped due to HR 60s. Monitor on telemetry to determine if additional adjustment needed. AC as below. Cardiology and EP following.  · Dialysis catheter thrombus: Extended into atrium on prior echo. PFO noted. On chronic AC for this with eliquis.  · A/C Systolic Heart Failure  · HTN: Multiple medications. Defer to nephrology.  · Hypothyroidism: TSH still very elevated. 99. This is improvement compared to last year when it was 150s. Synthroid dose increased this admission. Repeat TSH 1-2 months as outpatient.  · ACD: Epo noted.  · DM2: A1c 7.7. Hypoglycemia again on 5 units lantus. Stop long acting and change to low dose ssi.  · PPx: AC as above  · Disposition: HH/1-2 days    Alejo Weiss MD  [facility]  09/09/22  13:56 EDT    Dictated portions using Dragon dictation software.    During the entire encounter, I was wearing recommended PPE including face mask and eye protection. Hand sanitization was performed prior to entering room and upon exit.

## 2022-09-09 NOTE — PLAN OF CARE
"Goal Outcome Evaluation:  Plan of Care Reviewed With: patient           Outcome Evaluation: Pt recently back from dialysis but agrees to get OOB and sit up in chair.  Pt move from supine to sit with CGA and then walks around bed to recliner. Pt reports \"feels good\" to sit up.  Ed benefit of activity and ed posture with walker  "

## 2022-09-09 NOTE — NURSING NOTE
Dialysis complete and removed 3.5 liters with this treatment, no complications noted and vital signs are in epic.  Dressing change completed to dialysis catheter.

## 2022-09-09 NOTE — PLAN OF CARE
Goal Outcome Evaluation:               Vitals remain the same; DBP continues to be elevated; heart rate 80s to low 100s after HD. 3.5 liters taken off with HD. Up to chair this afternoon. BG 57 after HD; improved with lunch.

## 2022-09-10 LAB
ALBUMIN SERPL-MCNC: 3.2 G/DL (ref 3.5–5.2)
ANION GAP SERPL CALCULATED.3IONS-SCNC: 12 MMOL/L (ref 5–15)
BASOPHILS # BLD AUTO: 0.05 10*3/MM3 (ref 0–0.2)
BASOPHILS NFR BLD AUTO: 0.4 % (ref 0–1.5)
BUN SERPL-MCNC: 29 MG/DL (ref 6–20)
BUN/CREAT SERPL: 8.8 (ref 7–25)
CALCIUM SPEC-SCNC: 7.8 MG/DL (ref 8.6–10.5)
CHLORIDE SERPL-SCNC: 93 MMOL/L (ref 98–107)
CO2 SERPL-SCNC: 22 MMOL/L (ref 22–29)
CREAT SERPL-MCNC: 3.31 MG/DL (ref 0.57–1)
DEPRECATED RDW RBC AUTO: 48.8 FL (ref 37–54)
EGFRCR SERPLBLD CKD-EPI 2021: 15.8 ML/MIN/1.73
EOSINOPHIL # BLD AUTO: 0.02 10*3/MM3 (ref 0–0.4)
EOSINOPHIL NFR BLD AUTO: 0.2 % (ref 0.3–6.2)
ERYTHROCYTE [DISTWIDTH] IN BLOOD BY AUTOMATED COUNT: 15.7 % (ref 12.3–15.4)
GLUCOSE BLDC GLUCOMTR-MCNC: 217 MG/DL (ref 70–130)
GLUCOSE BLDC GLUCOMTR-MCNC: 228 MG/DL (ref 70–130)
GLUCOSE BLDC GLUCOMTR-MCNC: 370 MG/DL (ref 70–130)
GLUCOSE BLDC GLUCOMTR-MCNC: 401 MG/DL (ref 70–130)
GLUCOSE BLDC GLUCOMTR-MCNC: 401 MG/DL (ref 70–130)
GLUCOSE BLDC GLUCOMTR-MCNC: 479 MG/DL (ref 70–130)
GLUCOSE BLDC GLUCOMTR-MCNC: 494 MG/DL (ref 70–130)
GLUCOSE SERPL-MCNC: 210 MG/DL (ref 65–99)
HCT VFR BLD AUTO: 32 % (ref 34–46.6)
HGB BLD-MCNC: 10 G/DL (ref 12–15.9)
IMM GRANULOCYTES # BLD AUTO: 0.06 10*3/MM3 (ref 0–0.05)
IMM GRANULOCYTES NFR BLD AUTO: 0.5 % (ref 0–0.5)
LYMPHOCYTES # BLD AUTO: 0.82 10*3/MM3 (ref 0.7–3.1)
LYMPHOCYTES NFR BLD AUTO: 6.9 % (ref 19.6–45.3)
MCH RBC QN AUTO: 27 PG (ref 26.6–33)
MCHC RBC AUTO-ENTMCNC: 31.3 G/DL (ref 31.5–35.7)
MCV RBC AUTO: 86.5 FL (ref 79–97)
MONOCYTES # BLD AUTO: 0.74 10*3/MM3 (ref 0.1–0.9)
MONOCYTES NFR BLD AUTO: 6.2 % (ref 5–12)
NEUTROPHILS NFR BLD AUTO: 10.18 10*3/MM3 (ref 1.7–7)
NEUTROPHILS NFR BLD AUTO: 85.8 % (ref 42.7–76)
NRBC BLD AUTO-RTO: 0 /100 WBC (ref 0–0.2)
PHOSPHATE SERPL-MCNC: 3.1 MG/DL (ref 2.5–4.5)
PLATELET # BLD AUTO: 155 10*3/MM3 (ref 140–450)
PMV BLD AUTO: 11 FL (ref 6–12)
POTASSIUM SERPL-SCNC: 4.9 MMOL/L (ref 3.5–5.2)
RBC # BLD AUTO: 3.7 10*6/MM3 (ref 3.77–5.28)
SODIUM SERPL-SCNC: 127 MMOL/L (ref 136–145)
WBC NRBC COR # BLD: 11.87 10*3/MM3 (ref 3.4–10.8)

## 2022-09-10 PROCEDURE — 99232 SBSQ HOSP IP/OBS MODERATE 35: CPT | Performed by: INTERNAL MEDICINE

## 2022-09-10 PROCEDURE — 25010000002 ONDANSETRON PER 1 MG: Performed by: INTERNAL MEDICINE

## 2022-09-10 PROCEDURE — 85025 COMPLETE CBC W/AUTO DIFF WBC: CPT | Performed by: INTERNAL MEDICINE

## 2022-09-10 PROCEDURE — 82962 GLUCOSE BLOOD TEST: CPT

## 2022-09-10 PROCEDURE — 80069 RENAL FUNCTION PANEL: CPT | Performed by: INTERNAL MEDICINE

## 2022-09-10 PROCEDURE — 63710000001 INSULIN LISPRO (HUMAN) PER 5 UNITS: Performed by: INTERNAL MEDICINE

## 2022-09-10 PROCEDURE — 63710000001 ONDANSETRON PER 8 MG: Performed by: INTERNAL MEDICINE

## 2022-09-10 PROCEDURE — 63710000001 PREDNISONE PER 5 MG: Performed by: INTERNAL MEDICINE

## 2022-09-10 RX ORDER — ROPINIROLE 1 MG/1
1 TABLET, FILM COATED ORAL NIGHTLY
Status: DISCONTINUED | OUTPATIENT
Start: 2022-09-10 | End: 2022-09-13 | Stop reason: HOSPADM

## 2022-09-10 RX ADMIN — DILTIAZEM HYDROCHLORIDE 30 MG: 30 TABLET, FILM COATED ORAL at 13:51

## 2022-09-10 RX ADMIN — APIXABAN 5 MG: 5 TABLET, FILM COATED ORAL at 20:39

## 2022-09-10 RX ADMIN — Medication 3 MG: at 20:44

## 2022-09-10 RX ADMIN — ASPIRIN 81 MG: 81 TABLET, COATED ORAL at 09:56

## 2022-09-10 RX ADMIN — LEVOTHYROXINE SODIUM 250 MCG: 0.12 TABLET ORAL at 05:31

## 2022-09-10 RX ADMIN — SERTRALINE 150 MG: 100 TABLET, FILM COATED ORAL at 09:56

## 2022-09-10 RX ADMIN — DILTIAZEM HYDROCHLORIDE 30 MG: 30 TABLET, FILM COATED ORAL at 05:30

## 2022-09-10 RX ADMIN — SODIUM ZIRCONIUM CYCLOSILICATE 5 G: 5 POWDER, FOR SUSPENSION ORAL at 13:51

## 2022-09-10 RX ADMIN — GABAPENTIN 100 MG: 100 CAPSULE ORAL at 11:54

## 2022-09-10 RX ADMIN — Medication 3 MG: at 00:26

## 2022-09-10 RX ADMIN — METOPROLOL TARTRATE 100 MG: 50 TABLET, FILM COATED ORAL at 20:39

## 2022-09-10 RX ADMIN — PANTOPRAZOLE SODIUM 40 MG: 40 TABLET, DELAYED RELEASE ORAL at 09:56

## 2022-09-10 RX ADMIN — Medication 1 MG: at 09:56

## 2022-09-10 RX ADMIN — INSULIN LISPRO 3 UNITS: 100 INJECTION, SOLUTION INTRAVENOUS; SUBCUTANEOUS at 11:54

## 2022-09-10 RX ADMIN — METOPROLOL TARTRATE 100 MG: 50 TABLET, FILM COATED ORAL at 09:56

## 2022-09-10 RX ADMIN — PREDNISONE 5 MG: 10 TABLET ORAL at 09:57

## 2022-09-10 RX ADMIN — LOSARTAN POTASSIUM 100 MG: 100 TABLET, FILM COATED ORAL at 09:57

## 2022-09-10 RX ADMIN — APIXABAN 5 MG: 5 TABLET, FILM COATED ORAL at 09:56

## 2022-09-10 RX ADMIN — ROPINIROLE 1 MG: 1 TABLET, FILM COATED ORAL at 20:44

## 2022-09-10 RX ADMIN — INSULIN LISPRO 7 UNITS: 100 INJECTION, SOLUTION INTRAVENOUS; SUBCUTANEOUS at 21:38

## 2022-09-10 RX ADMIN — CLONIDINE HYDROCHLORIDE 0.2 MG: 0.1 TABLET ORAL at 16:33

## 2022-09-10 RX ADMIN — ONDANSETRON HYDROCHLORIDE 4 MG: 4 TABLET, FILM COATED ORAL at 03:55

## 2022-09-10 RX ADMIN — INSULIN LISPRO 3 UNITS: 100 INJECTION, SOLUTION INTRAVENOUS; SUBCUTANEOUS at 17:43

## 2022-09-10 RX ADMIN — DILTIAZEM HYDROCHLORIDE 30 MG: 30 TABLET, FILM COATED ORAL at 21:38

## 2022-09-10 RX ADMIN — OXYCODONE 5 MG: 5 TABLET ORAL at 20:39

## 2022-09-10 RX ADMIN — INSULIN LISPRO 6 UNITS: 100 INJECTION, SOLUTION INTRAVENOUS; SUBCUTANEOUS at 06:45

## 2022-09-10 RX ADMIN — HYDRALAZINE HYDROCHLORIDE 25 MG: 50 TABLET, FILM COATED ORAL at 05:31

## 2022-09-10 RX ADMIN — MICONAZOLE NITRATE 1 APPLICATION: 20 CREAM TOPICAL at 20:40

## 2022-09-10 RX ADMIN — CLONIDINE HYDROCHLORIDE 0.2 MG: 0.1 TABLET ORAL at 03:54

## 2022-09-10 NOTE — PROGRESS NOTES
[facility] Progress Note      Patient Name: Zaina Martinez  : 1965  MRN: [id number]  Primary Care Physician:  Jane Kyle NP  Date of admission: 9/3/2022    Subjective     Interval History:   F/u ESRD     Review of Systems:   Dialyzed yesterday 3.5L removed  HR 90s to 120s   Per RN family brings in fast food ie [employer]  Denies dyspnea   C/O RLS    Objective     Vitals:   Temp:  [98 °F (36.7 °C)-100.1 °F (37.8 °C)] 98.1 °F (36.7 °C)  Heart Rate:  [] 110  Resp:  [18-23] 18  BP: (117-158)/() 142/85  Flow (L/min):  [1-2] 2    Intake/Output Summary (Last 24 hours) at 9/10/2022 1348  Last data filed at 9/10/2022 0530  Gross per 24 hour   Intake --   Output 100 ml   Net -100 ml       Physical Exam:    General Appearance: frail WF in no acute distress  Neck: RIJ TDC in use, no JVD  Lungs: CTA bilat no rales  Heart: tachy irregular  Abdomen: soft, nontender, nondistended  Extremities: no edema, cyanosis or clubbing    Scheduled Meds:     apixaban, 5 mg, Oral, Q12H  aspirin, 81 mg, Oral, Daily  cloNIDine, 0.2 mg, Oral, Q12H  dilTIAZem, 30 mg, Oral, Q8H  epoetin brooke-epbx, 10,000 Units, Subcutaneous, Once per day on   folic acid, 1 mg, Oral, Daily  gabapentin, 100 mg, Oral, Daily With Lunch  hydrALAZINE, 25 mg, Oral, Q8H  insulin lispro, 0-7 Units, Subcutaneous, TID AC  levothyroxine, 250 mcg, Oral, Q AM  losartan, 100 mg, Oral, Q24H  metoprolol tartrate, 100 mg, Oral, Q12H  miconazole, 1 application, Topical, Q12H  pantoprazole, 40 mg, Oral, Daily  predniSONE, 5 mg, Oral, Daily With Breakfast  rOPINIRole, 0.75 mg, Oral, Nightly  sertraline, 150 mg, Oral, Daily  sodium zirconium cyclosilicate, 5 g, Oral, Daily      IV Meds:        Results Reviewed:   I have personally reviewed the results from the time of this admission to 9/10/2022 13:48 EDT     Results from last 7 days   Lab Units 09/10/22  1107 22  0504 22  0500   SODIUM mmol/L 127* 133* 129*   POTASSIUM  mmol/L 4.9 5.8* 5.6*   CHLORIDE mmol/L 93* 99 98   CO2 mmol/L 22.0 24.8 19.6*   BUN mg/dL 29* 38* 26*   CREATININE mg/dL 3.31* 3.93* 3.19*   CALCIUM mg/dL 7.8* 8.3* 8.0*   GLUCOSE mg/dL 210* 156* 209*     Estimated Creatinine Clearance: 17 mL/min (A) (by C-G formula based on SCr of 3.31 mg/dL (H)).  Results from last 7 days   Lab Units 09/10/22  1107 09/09/22  0504 09/08/22  0500 09/07/22  0508 09/06/22  0535 09/05/22  0612 09/04/22  0616   MAGNESIUM mg/dL  --   --   --   --  1.9 1.8 1.8   PHOSPHORUS mg/dL 3.1 4.1 3.0   < > 3.2 4.2 3.6    < > = values in this interval not displayed.         Results from last 7 days   Lab Units 09/10/22  0523 09/08/22  0500 09/07/22  0508 09/05/22  0612 09/04/22  0616   WBC 10*3/mm3 11.87* 9.62 9.66 8.43 8.51   HEMOGLOBIN g/dL 10.0* 10.2* 9.9* 9.2* 9.5*   PLATELETS 10*3/mm3 155 127* 154 183 170           Assessment / Plan     ASSESSMENT:  1. ESRD.  Dialysis MWF. Mild vol excess, improved, 3.5L removed yesterday.  Hyperkalemia related to poor diet, on daily lokelma, K down to 4.9 post HD (correction subpar).  RIJ TDC .  2. Aflutter / RVR on eliquis - diltiazem added per CARD, HR improved some  3. Dialysis catheter thrombus.  PFO.  Chronic AC on eliquis .  4. Acute on chronic systolic heart failure . Volume off with HD  5. HTN - BP remains labile; metop/clonidine inc'd 9/8 and CCB added for rate control  -- hydralazine could be causing some reflex tachycardia   6. DM2  7. Anemia of CKD, hgb stable on epo    PLAN:  DC hydralazine  HD MON; recheck labs then  D/W AMY and Dr Emile Powell MD  09/10/22  13:48 EDT    [facility]  [phone]

## 2022-09-10 NOTE — PLAN OF CARE
Goal Outcome Evaluation:               VSS. Up with assist to chair this afternoon. 02 increased to 2 liters from 1 liter. Heart rate ranges from 90s-120s.

## 2022-09-10 NOTE — PROGRESS NOTES
"Patient Care Team:  Jane Kyle NP as PCP - General (Family Medicine)  Adonis Fraga Jr., MD as Consulting Physician (Hematology and Oncology)    Chief Complaint: Follow-up atrial fibrillation with rapid ventricular rate.    Interval History:   Ventricular rate and blood pressure slightly better controlled today.  Continues with dietary noncompliance.    Objective   Vital Signs  Temp:  [98 °F (36.7 °C)-100.1 °F (37.8 °C)] 98.1 °F (36.7 °C)  Heart Rate:  [] 110  Resp:  [18-23] 18  BP: (117-158)/() 142/85    Intake/Output Summary (Last 24 hours) at 9/10/2022 1358  Last data filed at 9/10/2022 0530  Gross per 24 hour   Intake --   Output 100 ml   Net -100 ml     Flowsheet Rows    Flowsheet Row First Filed Value   Admission Height 157.5 cm (62.01\") Documented at 09/03/2022 1355   Admission Weight 64.9 kg (143 lb 1.3 oz) Documented at 09/03/2022 1355          Temp:  [98 °F (36.7 °C)-100.1 °F (37.8 °C)] 98.1 °F (36.7 °C)  Heart Rate:  [] 110  Resp:  [18-23] 18  BP: (117-158)/() 142/85    Intake/Output Summary (Last 24 hours) at 9/10/2022 1358  Last data filed at 9/10/2022 0530  Gross per 24 hour   Intake --   Output 100 ml   Net -100 ml     Flowsheet Rows    Flowsheet Row First Filed Value   Admission Height 157.5 cm (62.01\") Documented at 09/03/2022 1355   Admission Weight 64.9 kg (143 lb 1.3 oz) Documented at 09/03/2022 1355          General Appearance:    Alert, cooperative, in no acute distress   Head:    Normocephalic, without obvious abnormality, atraumatic       Neck/Lymph   No adenopathy, supple, no thyromegaly, no carotid bruit, no    JVD   Lungs:     Clear to auscultation bilaterally, no wheezes, rales, or     rhonchi    Cardiac:    Normal rate,  irregularly irregularrhythm, no murmur, no rub, no gallop   Chest Wall:    No abnormalities observed   GI:     Normal bowel sounds, soft, nontender, nondistended,            no rebound tenderness   Extremities:   No cyanosis, clubbing, or " edema   Circulatory/Peripheral Vascular :   Pulses palpable and equal bilaterally   Integumentary:   No bleeding or rash. Normal temperature           apixaban, 5 mg, Oral, Q12H  aspirin, 81 mg, Oral, Daily  cloNIDine, 0.2 mg, Oral, Q12H  dilTIAZem, 30 mg, Oral, Q8H  epoetin brooke-epbx, 10,000 Units, Subcutaneous, Once per day on Mon Wed Fri  folic acid, 1 mg, Oral, Daily  gabapentin, 100 mg, Oral, Daily With Lunch  insulin lispro, 0-7 Units, Subcutaneous, TID AC  levothyroxine, 250 mcg, Oral, Q AM  losartan, 100 mg, Oral, Q24H  metoprolol tartrate, 100 mg, Oral, Q12H  miconazole, 1 application, Topical, Q12H  pantoprazole, 40 mg, Oral, Daily  predniSONE, 5 mg, Oral, Daily With Breakfast  rOPINIRole, 0.75 mg, Oral, Nightly  sertraline, 150 mg, Oral, Daily  sodium zirconium cyclosilicate, 5 g, Oral, Daily             Results Review:    Results from last 7 days   Lab Units 09/10/22  1107   SODIUM mmol/L 127*   POTASSIUM mmol/L 4.9   CHLORIDE mmol/L 93*   CO2 mmol/L 22.0   BUN mg/dL 29*   CREATININE mg/dL 3.31*   GLUCOSE mg/dL 210*   CALCIUM mg/dL 7.8*         Results from last 7 days   Lab Units 09/10/22  0523   WBC 10*3/mm3 11.87*   HEMOGLOBIN g/dL 10.0*   HEMATOCRIT % 32.0*   PLATELETS 10*3/mm3 155             Results from last 7 days   Lab Units 09/06/22  0535   MAGNESIUM mg/dL 1.9         @LABRCNT(bnp)@  I reviewed the patient's new clinical results.  I personally viewed and interpreted the patient's EKG/Telemetry data            Assessment & Plan   1. Persistent atrial flutter   2. Shortness of breath - multifactorial   3. End stage renal disease on dialysis  4. Pericardial effusion - limited echo on 8/25 with small effusion and no tamponade. No clinical evidence that this has changed.   5. Hypertension: Appears slightly better controlled.  6. History of stroke - remains on ASA in addition to DOAC.  7. Transient cardiomyopathy, improved on repeat echocardiogram   8. Nausea   9.  Hyponatremia per renal    -Continue  metoprolol and diltiazem therapy.  If blood pressure and heart rate continue to be reasonably reasonably controlled on the diltiazem and metoprolol therapy I would recommend moving diltiazem to 120  mg sustained release tomorrow.  -Volume management per renal.  I will sign off.

## 2022-09-11 LAB
GLUCOSE BLDC GLUCOMTR-MCNC: 242 MG/DL (ref 70–130)
GLUCOSE BLDC GLUCOMTR-MCNC: 252 MG/DL (ref 70–130)
GLUCOSE BLDC GLUCOMTR-MCNC: 271 MG/DL (ref 70–130)
GLUCOSE BLDC GLUCOMTR-MCNC: 304 MG/DL (ref 70–130)
GLUCOSE BLDC GLUCOMTR-MCNC: 375 MG/DL (ref 70–130)
GLUCOSE BLDC GLUCOMTR-MCNC: 402 MG/DL (ref 70–130)
GLUCOSE BLDC GLUCOMTR-MCNC: 414 MG/DL (ref 70–130)

## 2022-09-11 PROCEDURE — 82962 GLUCOSE BLOOD TEST: CPT

## 2022-09-11 PROCEDURE — 63710000001 PREDNISONE PER 5 MG: Performed by: INTERNAL MEDICINE

## 2022-09-11 PROCEDURE — 25010000002 ONDANSETRON PER 1 MG: Performed by: INTERNAL MEDICINE

## 2022-09-11 PROCEDURE — 63710000001 INSULIN LISPRO (HUMAN) PER 5 UNITS: Performed by: INTERNAL MEDICINE

## 2022-09-11 RX ORDER — DOXYCYCLINE 100 MG/1
100 CAPSULE ORAL EVERY 12 HOURS SCHEDULED
Status: DISCONTINUED | OUTPATIENT
Start: 2022-09-11 | End: 2022-09-13 | Stop reason: HOSPADM

## 2022-09-11 RX ORDER — AMLODIPINE BESYLATE 5 MG/1
5 TABLET ORAL
Status: DISCONTINUED | OUTPATIENT
Start: 2022-09-11 | End: 2022-09-13 | Stop reason: HOSPADM

## 2022-09-11 RX ORDER — DILTIAZEM HYDROCHLORIDE 120 MG/1
120 CAPSULE, COATED, EXTENDED RELEASE ORAL
Status: DISCONTINUED | OUTPATIENT
Start: 2022-09-12 | End: 2022-09-13 | Stop reason: HOSPADM

## 2022-09-11 RX ADMIN — DILTIAZEM HYDROCHLORIDE 30 MG: 30 TABLET, FILM COATED ORAL at 16:41

## 2022-09-11 RX ADMIN — PANTOPRAZOLE SODIUM 40 MG: 40 TABLET, DELAYED RELEASE ORAL at 09:51

## 2022-09-11 RX ADMIN — APIXABAN 5 MG: 5 TABLET, FILM COATED ORAL at 21:03

## 2022-09-11 RX ADMIN — SERTRALINE 150 MG: 100 TABLET, FILM COATED ORAL at 09:51

## 2022-09-11 RX ADMIN — PREDNISONE 5 MG: 10 TABLET ORAL at 09:59

## 2022-09-11 RX ADMIN — MICONAZOLE NITRATE 1 APPLICATION: 20 CREAM TOPICAL at 10:03

## 2022-09-11 RX ADMIN — ONDANSETRON 4 MG: 2 INJECTION INTRAMUSCULAR; INTRAVENOUS at 16:46

## 2022-09-11 RX ADMIN — APIXABAN 5 MG: 5 TABLET, FILM COATED ORAL at 09:51

## 2022-09-11 RX ADMIN — CLONIDINE HYDROCHLORIDE 0.2 MG: 0.1 TABLET ORAL at 06:28

## 2022-09-11 RX ADMIN — ROPINIROLE 1 MG: 1 TABLET, FILM COATED ORAL at 21:03

## 2022-09-11 RX ADMIN — INSULIN LISPRO 4 UNITS: 100 INJECTION, SOLUTION INTRAVENOUS; SUBCUTANEOUS at 16:42

## 2022-09-11 RX ADMIN — DOXYCYCLINE 100 MG: 100 CAPSULE ORAL at 13:04

## 2022-09-11 RX ADMIN — ASPIRIN 81 MG: 81 TABLET, COATED ORAL at 09:50

## 2022-09-11 RX ADMIN — INSULIN LISPRO 7 UNITS: 100 INJECTION, SOLUTION INTRAVENOUS; SUBCUTANEOUS at 06:32

## 2022-09-11 RX ADMIN — Medication 1 MG: at 10:02

## 2022-09-11 RX ADMIN — MICONAZOLE NITRATE 1 APPLICATION: 20 CREAM TOPICAL at 21:21

## 2022-09-11 RX ADMIN — OXYCODONE 5 MG: 5 TABLET ORAL at 21:03

## 2022-09-11 RX ADMIN — DILTIAZEM HYDROCHLORIDE 30 MG: 30 TABLET, FILM COATED ORAL at 06:28

## 2022-09-11 RX ADMIN — LOSARTAN POTASSIUM 100 MG: 100 TABLET, FILM COATED ORAL at 09:51

## 2022-09-11 RX ADMIN — SODIUM ZIRCONIUM CYCLOSILICATE 5 G: 5 POWDER, FOR SUSPENSION ORAL at 10:03

## 2022-09-11 RX ADMIN — METOPROLOL TARTRATE 100 MG: 50 TABLET, FILM COATED ORAL at 21:03

## 2022-09-11 RX ADMIN — GABAPENTIN 100 MG: 100 CAPSULE ORAL at 13:04

## 2022-09-11 RX ADMIN — INSULIN LISPRO 5 UNITS: 100 INJECTION, SOLUTION INTRAVENOUS; SUBCUTANEOUS at 13:04

## 2022-09-11 RX ADMIN — METOPROLOL TARTRATE 100 MG: 50 TABLET, FILM COATED ORAL at 09:50

## 2022-09-11 RX ADMIN — INSULIN GLARGINE-YFGN 3 UNITS: 100 INJECTION, SOLUTION SUBCUTANEOUS at 21:03

## 2022-09-11 RX ADMIN — CLONIDINE HYDROCHLORIDE 0.2 MG: 0.1 TABLET ORAL at 16:42

## 2022-09-11 RX ADMIN — AMLODIPINE BESYLATE 5 MG: 5 TABLET ORAL at 16:42

## 2022-09-11 RX ADMIN — DOXYCYCLINE 100 MG: 100 CAPSULE ORAL at 21:03

## 2022-09-11 RX ADMIN — LEVOTHYROXINE SODIUM 250 MCG: 0.12 TABLET ORAL at 06:27

## 2022-09-11 NOTE — NURSING NOTE
Pg out to  on call regarding bs in 400's. Pt is non complaint. Educated again on diet. Pt states she drink a big red and covers herself at home with extra insulin. Educated on importance of monitoring what she eats. Order to give sliding scale and recheck. Will CTM

## 2022-09-11 NOTE — PROGRESS NOTES
Name: Zaina Martinez ADMIT: 9/3/2022   : 1965  PCP: Jane Kyle NP    MRN: [id number] LOS: 3 days   AGE/SEX: 56 y.o. female  ROOM: [facility]   Subjective   Chief Complaint   Patient presents with   • Shortness of Breath      Blood glucose every elevated again overnight. Her RN also directs to me a left antecubital forearm lesion which the patient says is tender to palpation    Objective   Vital Signs  Temp:  [97.7 °F (36.5 °C)-98.5 °F (36.9 °C)] 98.3 °F (36.8 °C)  Heart Rate:  [] 101  Resp:  [18-20] 20  BP: (121-147)/() 146/106  SpO2:  [92 %-98 %] 97 %  on  Flow (L/min):  [2] 2;   Device (Oxygen Therapy): nasal cannula  Body mass index is 24.12 kg/m².    Physical Exam  Vitals and nursing note reviewed.   Constitutional:       Appearance: She is ill-appearing (chronically). She is not diaphoretic.   Cardiovascular:      Rate and Rhythm: Normal rate. Rhythm irregular.   Pulmonary:      Effort: Pulmonary effort is normal.      Breath sounds: No wheezing.   Abdominal:      General: There is no distension.      Palpations: Abdomen is soft.      Tenderness: There is no abdominal tenderness. There is no guarding or rebound.   Musculoskeletal:         General: No swelling or tenderness.      Cervical back: Neck supple. No tenderness.   Skin:     General: Skin is warm and dry.      Findings: Lesion present.      Comments: Left antecubital forearm cutaneous bump with the appearance of an abscess with mild surrounding erythema at the site of a previous iv site. It is indurated and tender to palpation. There is no fluctuance and I am unable to express any pus.   Neurological:      Mental Status: She is alert.   Psychiatric:         Mood and Affect: Mood normal.         Behavior: Behavior normal.       I have reviewed the physical exam today and updated where needed as above.          Results Review:       I reviewed the patient's new clinical results.      Results from last 7 days   Lab Units  09/10/22  0523 09/08/22  0500 09/07/22  0508 09/05/22  0612   WBC 10*3/mm3 11.87* 9.62 9.66 8.43   HEMOGLOBIN g/dL 10.0* 10.2* 9.9* 9.2*   PLATELETS 10*3/mm3 155 127* 154 183     Results from last 7 days   Lab Units 09/10/22  1107 09/09/22  0504 09/08/22  0500 09/07/22  0508   SODIUM mmol/L 127* 133* 129* 129*   POTASSIUM mmol/L 4.9 5.8* 5.6* 5.6*   CHLORIDE mmol/L 93* 99 98 99   CO2 mmol/L 22.0 24.8 19.6* 21.0*   BUN mg/dL 29* 38* 26* 27*   CREATININE mg/dL 3.31* 3.93* 3.19* 3.22*   GLUCOSE mg/dL 210* 156* 209* 257*   Estimated Creatinine Clearance: 17.9 mL/min (A) (by C-G formula based on SCr of 3.31 mg/dL (H)).  Results from last 7 days   Lab Units 09/10/22  1107 09/09/22  0504 09/08/22  0500 09/07/22  0508 09/06/22  0535 09/05/22  0612   CALCIUM mg/dL 7.8* 8.3* 8.0* 7.4* 7.7* 7.8*   ALBUMIN g/dL 3.20* 3.50 3.00* 2.90* 3.30* 2.90*   MAGNESIUM mg/dL  --   --   --   --  1.9 1.8   PHOSPHORUS mg/dL 3.1 4.1 3.0 3.4 3.2 4.2          apixaban, 5 mg, Oral, Q12H  aspirin, 81 mg, Oral, Daily  cloNIDine, 0.2 mg, Oral, Q12H  dilTIAZem, 30 mg, Oral, Q8H  doxycycline, 100 mg, Oral, Q12H  epoetin brooke-epbx, 10,000 Units, Subcutaneous, Once per day on Mon Wed Fri  folic acid, 1 mg, Oral, Daily  gabapentin, 100 mg, Oral, Daily With Lunch  insulin glargine, 3 Units, Subcutaneous, Nightly  insulin lispro, 0-7 Units, Subcutaneous, TID AC  levothyroxine, 250 mcg, Oral, Q AM  losartan, 100 mg, Oral, Q24H  metoprolol tartrate, 100 mg, Oral, Q12H  miconazole, 1 application, Topical, Q12H  pantoprazole, 40 mg, Oral, Daily  predniSONE, 5 mg, Oral, Daily With Breakfast  rOPINIRole, 1 mg, Oral, Nightly  sertraline, 150 mg, Oral, Daily  sodium zirconium cyclosilicate, 5 g, Oral, Daily       Diet Regular; Cardiac    Assessment & Plan      Active Hospital Problems    Diagnosis  POA   • **Acute pulmonary edema (HCC) [J81.0]  Yes   • Atrial flutter (HCC) [I48.92]  Yes   • Chronic systolic CHF (congestive heart failure) (HCC) [I50.22]  Yes   •  Thrombus of venous dialysis catheter ([facility]) [T82.868A]  Yes   • Anemia due to chronic kidney disease, on chronic dialysis ([facility]) [N18.6, D63.1, Z99.2]  Not Applicable   • CAD (coronary artery disease) [I25.10]  Yes   • ESRD (end stage renal disease) ([facility]) [N18.6]  Yes   • Hypothyroidism [E03.9]  Yes   • Type 2 diabetes mellitus with kidney complication, with long-term current use of insulin ([facility]) [E11.29, Z79.4]  Not Applicable      Resolved Hospital Problems   No resolved problems to display.       · Acute Pulmonary Edema: Volume removal with HD. Wean O2 as tolerated. Nephrology following.  · Left antecubital skin and soft tissue infection at prior IV site-this has the outward appearance of a small abscess, but it feels indurated and there is no fluctuance and I am unable to express any fluid/pus. Because of it's location and vascularity, I am not sure that this could be incised. We will try warm compress and oral doxycycline and see if this helps.  · ESRD-on HD  · Atrial Flutter: on metoprolol and diltiazem for rate control. eliquis for AC. HR is better today, will switch her to diltiazem long acting tomorrow morning.  · Dialysis catheter thrombus: Extended into atrium on prior echo. PFO noted. On chronic AC for this with eliquis.  · Acute on chronic systolic heart failure with recovery of EF-managed with dialysis  · HTN: hydralazine held to assist with HR. bp is a bit elevated today. Will add some amlodipine  · Hypothyroidism: TSH still very elevated. 99. This is improvement compared to last year when it was 150s. Synthroid dose increased this admission. Repeat TSH 1-2 months as outpatient.  · ACD: Epo noted.  · DM2 with severe hyperglycemia and intermittent hypoglycemia: A1c 7.7. Will try an even smaller dose of lantus 3 mg this evening and monitor her response.  · RLS-requip increased to 1 mg qhs  · PPx: AC as above  · Disposition: HH/1-2 days after I get her blood sugar under control and we deal with this thing  on her left arm    Ronnie Baptiste MD  [facility]  09/11/22  12:09 EDT    Dictated portions using Dragon dictation software.    During the entire encounter, I was wearing recommended PPE including face mask and eye protection. Hand sanitization was performed prior to entering room and upon exit.

## 2022-09-11 NOTE — NURSING NOTE
Germain Johnson is a 85 year old male here for  Chief Complaint   Patient presents with   • Cancer     Denies latex allergy or sensitivity.    Medication verified, no changes.  PCP and Pharmacy verified.    Social History     Tobacco Use   Smoking Status Former Smoker   • Packs/day: 3.00   • Years: 60.00   • Pack years: 180.00   • Types: Cigarettes   • Quit date: 2015   • Years since quittin.0   Smokeless Tobacco Never Used     Advance Directives Filed: Yes    ECOG:   ECOG [21 1124]   ECOG Performance Status 1       Height: No.  Ht Readings from Last 1 Encounters:   21 5' 5\" (1.651 m)     Weight:Yes, shoes on.  Wt Readings from Last 3 Encounters:   21 64.2 kg   07/15/21 64.4 kg   21 66.7 kg       BMI: Body mass index is 23.55 kg/m².    REVIEW OF SYSTEMS  GENERAL:  Patient denies headache, fevers, chills, night sweats, excessive fatigue, change in appetite, weight loss, dizziness  ALLERGIC/IMMUNOLOGIC: Verified allergies: Yes  EYES:  Patient denies significant visual difficulties, double vision, blurred vision  ENT/MOUTH: Patient denies problems with hearing, sore throat, sinus drainage, mouth sores  ENDOCRINE:  Patient denies diabetes, thyroid disease, hormone replacement, hot flashes  HEMATOLOGIC/LYMPHATIC: Patient denies easy bruising, bleeding, tender lymph nodes, swollen lymph nodes  BREASTS: Patient denies abnormal masses of breast, nipple discharge, pain  RESPIRATORY:  Patient denies lung pain with breathing, coughing up blood, but complains of: cough and shortness of breath  CARDIOVASCULAR:  Patient denies anginal chest pain, palpitations, shortness of breath when lying flat, peripheral edema  GASTROINTESTINAL: Patient denies abdominal pain , nausea, vomiting, diarrhea, GI bleeding, constipation, change in bowel habits, heartburn, sensation of feeling full, difficulty swallowing  : Patient denies blood in the urine, burning with urination, frequency, urgency, hesitancy,  Pg out to  on call regarding bs in 400's. Pt is non complaint with diet and says she will eat what she wants. Order to give sliding scale. CTM   incontinence  MUSCULOSKELETAL:  Patient denies joint pain, bone pain, joint swelling, redness, decreased range of motion  SKIN:  Patient denies chronic rashes, inflammation, ulcerations, skin changes, itching  NEUROLOGIC:  Patient denies loss of balance, areas of focal weakness, abnormal gait, sensory problems, numbness, tingling  PSYCHIATRIC: Patient denies insomnia, depression, anxiety

## 2022-09-12 LAB
ALBUMIN SERPL-MCNC: 3.1 G/DL (ref 3.5–5.2)
ANION GAP SERPL CALCULATED.3IONS-SCNC: 11.7 MMOL/L (ref 5–15)
BASOPHILS # BLD AUTO: 0.04 10*3/MM3 (ref 0–0.2)
BASOPHILS NFR BLD AUTO: 0.4 % (ref 0–1.5)
BUN SERPL-MCNC: 53 MG/DL (ref 6–20)
BUN/CREAT SERPL: 12.3 (ref 7–25)
CALCIUM SPEC-SCNC: 8.2 MG/DL (ref 8.6–10.5)
CHLORIDE SERPL-SCNC: 92 MMOL/L (ref 98–107)
CO2 SERPL-SCNC: 18.3 MMOL/L (ref 22–29)
CREAT SERPL-MCNC: 4.3 MG/DL (ref 0.57–1)
DEPRECATED RDW RBC AUTO: 49.6 FL (ref 37–54)
EGFRCR SERPLBLD CKD-EPI 2021: 11.5 ML/MIN/1.73
EOSINOPHIL # BLD AUTO: 0.03 10*3/MM3 (ref 0–0.4)
EOSINOPHIL NFR BLD AUTO: 0.3 % (ref 0.3–6.2)
ERYTHROCYTE [DISTWIDTH] IN BLOOD BY AUTOMATED COUNT: 15.9 % (ref 12.3–15.4)
GLUCOSE BLDC GLUCOMTR-MCNC: 182 MG/DL (ref 70–130)
GLUCOSE BLDC GLUCOMTR-MCNC: 190 MG/DL (ref 70–130)
GLUCOSE BLDC GLUCOMTR-MCNC: 209 MG/DL (ref 70–130)
GLUCOSE BLDC GLUCOMTR-MCNC: 226 MG/DL (ref 70–130)
GLUCOSE SERPL-MCNC: 218 MG/DL (ref 65–99)
HCT VFR BLD AUTO: 30.7 % (ref 34–46.6)
HGB BLD-MCNC: 9.6 G/DL (ref 12–15.9)
IMM GRANULOCYTES # BLD AUTO: 0.04 10*3/MM3 (ref 0–0.05)
IMM GRANULOCYTES NFR BLD AUTO: 0.4 % (ref 0–0.5)
LYMPHOCYTES # BLD AUTO: 1.45 10*3/MM3 (ref 0.7–3.1)
LYMPHOCYTES NFR BLD AUTO: 15.9 % (ref 19.6–45.3)
MCH RBC QN AUTO: 26.8 PG (ref 26.6–33)
MCHC RBC AUTO-ENTMCNC: 31.3 G/DL (ref 31.5–35.7)
MCV RBC AUTO: 85.8 FL (ref 79–97)
MONOCYTES # BLD AUTO: 0.72 10*3/MM3 (ref 0.1–0.9)
MONOCYTES NFR BLD AUTO: 7.9 % (ref 5–12)
NEUTROPHILS NFR BLD AUTO: 6.82 10*3/MM3 (ref 1.7–7)
NEUTROPHILS NFR BLD AUTO: 75.1 % (ref 42.7–76)
NRBC BLD AUTO-RTO: 0 /100 WBC (ref 0–0.2)
PHOSPHATE SERPL-MCNC: 4.9 MG/DL (ref 2.5–4.5)
PLATELET # BLD AUTO: 138 10*3/MM3 (ref 140–450)
PMV BLD AUTO: 10.8 FL (ref 6–12)
POTASSIUM SERPL-SCNC: 6.6 MMOL/L (ref 3.5–5.2)
RBC # BLD AUTO: 3.58 10*6/MM3 (ref 3.77–5.28)
SODIUM SERPL-SCNC: 122 MMOL/L (ref 136–145)
WBC NRBC COR # BLD: 9.1 10*3/MM3 (ref 3.4–10.8)

## 2022-09-12 PROCEDURE — 82962 GLUCOSE BLOOD TEST: CPT

## 2022-09-12 PROCEDURE — 63710000001 INSULIN LISPRO (HUMAN) PER 5 UNITS: Performed by: INTERNAL MEDICINE

## 2022-09-12 PROCEDURE — 25010000002 EPOETIN ALFA-EPBX 10000 UNIT/ML SOLUTION: Performed by: INTERNAL MEDICINE

## 2022-09-12 PROCEDURE — 63710000001 PREDNISONE PER 5 MG: Performed by: INTERNAL MEDICINE

## 2022-09-12 PROCEDURE — 25010000002 HEPARIN (PORCINE) PER 1000 UNITS: Performed by: INTERNAL MEDICINE

## 2022-09-12 PROCEDURE — 85025 COMPLETE CBC W/AUTO DIFF WBC: CPT | Performed by: INTERNAL MEDICINE

## 2022-09-12 PROCEDURE — 80069 RENAL FUNCTION PANEL: CPT | Performed by: INTERNAL MEDICINE

## 2022-09-12 RX ORDER — ALBUMIN (HUMAN) 12.5 G/50ML
12.5 SOLUTION INTRAVENOUS AS NEEDED
Status: DISCONTINUED | OUTPATIENT
Start: 2022-09-12 | End: 2022-09-13 | Stop reason: HOSPADM

## 2022-09-12 RX ORDER — ALBUMIN (HUMAN) 12.5 G/50ML
12.5 SOLUTION INTRAVENOUS AS NEEDED
Status: ACTIVE | OUTPATIENT
Start: 2022-09-12 | End: 2022-09-12

## 2022-09-12 RX ADMIN — INSULIN LISPRO 2 UNITS: 100 INJECTION, SOLUTION INTRAVENOUS; SUBCUTANEOUS at 14:25

## 2022-09-12 RX ADMIN — MICONAZOLE NITRATE 1 APPLICATION: 20 CREAM TOPICAL at 21:36

## 2022-09-12 RX ADMIN — EPOETIN ALFA-EPBX 10000 UNITS: 10000 INJECTION, SOLUTION INTRAVENOUS; SUBCUTANEOUS at 14:26

## 2022-09-12 RX ADMIN — Medication 3 MG: at 23:22

## 2022-09-12 RX ADMIN — OXYCODONE 5 MG: 5 TABLET ORAL at 22:21

## 2022-09-12 RX ADMIN — LEVOTHYROXINE SODIUM 250 MCG: 0.12 TABLET ORAL at 06:26

## 2022-09-12 RX ADMIN — GABAPENTIN 100 MG: 100 CAPSULE ORAL at 14:09

## 2022-09-12 RX ADMIN — ROPINIROLE 1 MG: 1 TABLET, FILM COATED ORAL at 21:34

## 2022-09-12 RX ADMIN — METOPROLOL TARTRATE 5 MG: 1 INJECTION, SOLUTION INTRAVENOUS at 23:20

## 2022-09-12 RX ADMIN — DOXYCYCLINE 100 MG: 100 CAPSULE ORAL at 14:10

## 2022-09-12 RX ADMIN — APIXABAN 5 MG: 5 TABLET, FILM COATED ORAL at 21:34

## 2022-09-12 RX ADMIN — INSULIN GLARGINE-YFGN 3 UNITS: 100 INJECTION, SOLUTION SUBCUTANEOUS at 21:34

## 2022-09-12 RX ADMIN — CLONIDINE HYDROCHLORIDE 0.2 MG: 0.1 TABLET ORAL at 18:02

## 2022-09-12 RX ADMIN — METOPROLOL TARTRATE 100 MG: 50 TABLET, FILM COATED ORAL at 14:09

## 2022-09-12 RX ADMIN — Medication 1 MG: at 14:10

## 2022-09-12 RX ADMIN — PANTOPRAZOLE SODIUM 40 MG: 40 TABLET, DELAYED RELEASE ORAL at 14:10

## 2022-09-12 RX ADMIN — LOSARTAN POTASSIUM 100 MG: 100 TABLET, FILM COATED ORAL at 14:10

## 2022-09-12 RX ADMIN — PREDNISONE 5 MG: 10 TABLET ORAL at 14:10

## 2022-09-12 RX ADMIN — METOPROLOL TARTRATE 100 MG: 50 TABLET, FILM COATED ORAL at 21:34

## 2022-09-12 RX ADMIN — AMLODIPINE BESYLATE 5 MG: 5 TABLET ORAL at 14:09

## 2022-09-12 RX ADMIN — APIXABAN 5 MG: 5 TABLET, FILM COATED ORAL at 14:09

## 2022-09-12 RX ADMIN — INSULIN LISPRO 3 UNITS: 100 INJECTION, SOLUTION INTRAVENOUS; SUBCUTANEOUS at 18:02

## 2022-09-12 RX ADMIN — HEPARIN SODIUM 4000 UNITS: 1000 INJECTION INTRAVENOUS; SUBCUTANEOUS at 10:24

## 2022-09-12 RX ADMIN — ASPIRIN 81 MG: 81 TABLET, COATED ORAL at 14:09

## 2022-09-12 RX ADMIN — DOXYCYCLINE 100 MG: 100 CAPSULE ORAL at 21:34

## 2022-09-12 RX ADMIN — SERTRALINE 150 MG: 100 TABLET, FILM COATED ORAL at 14:09

## 2022-09-12 RX ADMIN — CLONIDINE HYDROCHLORIDE 0.2 MG: 0.1 TABLET ORAL at 06:26

## 2022-09-12 RX ADMIN — DILTIAZEM HYDROCHLORIDE 120 MG: 120 CAPSULE, COATED, EXTENDED RELEASE ORAL at 14:09

## 2022-09-12 NOTE — PROGRESS NOTES
[facility] Progress Note      Patient Name: Zaina Martinez  : 1965  MRN: [id number]  Primary Care Physician:  Jane Kyle NP  Date of admission: 9/3/2022    Subjective     Interval History:   F/u ESRD on HD    Seen on dialysis, tolerating. She has Rt IJ TDC  HR 90s to 120s, fluctuating  No fever  Not compliant with diet  Denies dyspnea   C/O RLS    Review of Systems:   Reviewed 14 systems. ROS is negative except for above    Objective     Vitals:   Temp:  [97.5 °F (36.4 °C)-98.9 °F (37.2 °C)] 97.5 °F (36.4 °C)  Heart Rate:  [] 104  Resp:  [16-20] 16  BP: (122-138)/(75-98) 124/88  Flow (L/min):  [0-2] 0    Intake/Output Summary (Last 24 hours) at 2022 1007  Last data filed at 2022 1700  Gross per 24 hour   Intake 240 ml   Output 100 ml   Net 140 ml       Physical Exam:    General Appearance: frail WF in no acute distress  Neck: RIJ TDC in use, no JVD  Lungs: CTA bilat no rales  Heart: tachy irregular  Abdomen: soft, nontender, nondistended  Extremities: no edema, cyanosis or clubbing    Scheduled Meds:     amLODIPine, 5 mg, Oral, Q24H  apixaban, 5 mg, Oral, Q12H  aspirin, 81 mg, Oral, Daily  cloNIDine, 0.2 mg, Oral, Q12H  dilTIAZem CD, 120 mg, Oral, Q24H  doxycycline, 100 mg, Oral, Q12H  epoetin brooke-epbx, 10,000 Units, Subcutaneous, Once per day on   folic acid, 1 mg, Oral, Daily  gabapentin, 100 mg, Oral, Daily With Lunch  insulin glargine, 3 Units, Subcutaneous, Nightly  insulin lispro, 0-7 Units, Subcutaneous, TID AC  levothyroxine, 250 mcg, Oral, Q AM  losartan, 100 mg, Oral, Q24H  metoprolol tartrate, 100 mg, Oral, Q12H  miconazole, 1 application, Topical, Q12H  pantoprazole, 40 mg, Oral, Daily  predniSONE, 5 mg, Oral, Daily With Breakfast  rOPINIRole, 1 mg, Oral, Nightly  sertraline, 150 mg, Oral, Daily  sodium zirconium cyclosilicate, 5 g, Oral, Daily      IV Meds:        Results Reviewed:   I have personally reviewed the results from the time  of this admission to 9/12/2022 10:07 EDT     Results from last 7 days   Lab Units 09/12/22  0429 09/10/22  1107 09/09/22  0504   SODIUM mmol/L 122* 127* 133*   POTASSIUM mmol/L 6.6* 4.9 5.8*   CHLORIDE mmol/L 92* 93* 99   CO2 mmol/L 18.3* 22.0 24.8   BUN mg/dL 53* 29* 38*   CREATININE mg/dL 4.30* 3.31* 3.93*   CALCIUM mg/dL 8.2* 7.8* 8.3*   GLUCOSE mg/dL 218* 210* 156*     Estimated Creatinine Clearance: 13.8 mL/min (A) (by C-G formula based on SCr of 4.3 mg/dL (H)).  Results from last 7 days   Lab Units 09/12/22  0429 09/10/22  1107 09/09/22  0504 09/07/22  0508 09/06/22  0535   MAGNESIUM mg/dL  --   --   --   --  1.9   PHOSPHORUS mg/dL 4.9* 3.1 4.1   < > 3.2    < > = values in this interval not displayed.         Results from last 7 days   Lab Units 09/12/22  0430 09/10/22  0523 09/08/22  0500 09/07/22  0508   WBC 10*3/mm3 9.10 11.87* 9.62 9.66   HEMOGLOBIN g/dL 9.6* 10.0* 10.2* 9.9*   PLATELETS 10*3/mm3 138* 155 127* 154           Assessment / Plan     ASSESSMENT:  1. ESRD.  Dialysis MWF. Mild vol excess, improved.  Hyperkalemia related to poor diet, on daily lokelma, K is higher today.  RIJ TDC .  2. Aflutter / RVR on eliquis - diltiazem added per CARD, HR improved some.  She is also on clonidine and metoprolol  3. Dialysis catheter thrombus.  PFO.  Chronic AC on eliquis .  4. Acute on chronic systolic heart failure . Volume off with HD  5. HTN - BP remains labile; metoprolol/clonidine inc'd 9/8 and CCB added for rate control  -- hydralazine was stopped because of concern of reflex tachycardia   6. DM2  7. Anemia of CKD, hgb stable on epo    PLAN:  Off hydralazine  HD today then on MWF schedule  Advised on compliance with diet  Continue amlodipine, clonidine, diltiazem CD and metoprolol with losartan for blood pressure control.  Monitor blood pressure  Continue Lokelma for better potassium control.  The patient was counseled about her diet    Will continue to follow.    Gonzalez Rivera MD  09/12/22  10:07  CARMINA    [facility]  [phone]

## 2022-09-12 NOTE — NURSING NOTE
Pg out Dr on call regarding HR in 50's. Pt asymptomatic. Cardizem not given, last dose given late. No new orders. Continue to monitor.

## 2022-09-12 NOTE — SIGNIFICANT NOTE
09/12/22 1531   OTHER   Discipline physical therapist   Rehab Time/Intention   Session Not Performed other (see comments)  (pt just back to room from dialysis, attempted to see this afternoon, but pt reports she is exhausted and politely requests PT to check back at another time. Will follow up tomorrow.)   Recommendation   PT - Next Appointment 09/13/22

## 2022-09-12 NOTE — PROGRESS NOTES
Name: Zaina Martinez ADMIT: 9/3/2022   : 1965  PCP: Jane Kyle NP    MRN: [id number] LOS: 4 days   AGE/SEX: 56 y.o. female  ROOM: [facility]   Subjective   Chief Complaint   Patient presents with   • Shortness of Breath      Seen while in dialysis. She has no complaints. She says that she was able to express some puss from the cutaneous abscess on her left arm. It does look a little smaller and isn't as indurated.     Objective   Vital Signs  Temp:  [97.5 °F (36.4 °C)-98.9 °F (37.2 °C)] 97.8 °F (36.6 °C)  Heart Rate:  [] 125  Resp:  [16-20] 16  BP: (122-153)/() 153/101  SpO2:  [91 %-96 %] 91 %  on  Flow (L/min):  [0-2] 0;   Device (Oxygen Therapy): nasal cannula  Body mass index is 24.12 kg/m².    Physical Exam  Vitals and nursing note reviewed.   Constitutional:       Appearance: She is ill-appearing (chronically). She is not diaphoretic.   Cardiovascular:      Rate and Rhythm: Normal rate. Rhythm irregular.   Pulmonary:      Effort: Pulmonary effort is normal.      Breath sounds: No wheezing.   Abdominal:      General: There is no distension.      Palpations: Abdomen is soft.      Tenderness: There is no abdominal tenderness. There is no guarding or rebound.   Musculoskeletal:         General: No swelling or tenderness.      Cervical back: Neck supple. No tenderness.   Skin:     General: Skin is warm and dry.      Findings: Lesion present.      Comments: Small left antecubital forearm cutaneous abscess with cellulitis, improved from yesterday   Neurological:      Mental Status: She is alert.   Psychiatric:         Mood and Affect: Mood normal.         Behavior: Behavior normal.       I have reviewed the physical exam today and updated where needed as above.          Results Review:       I reviewed the patient's new clinical results.      Results from last 7 days   Lab Units 22  0430 09/10/22  0523 22  0500 22  0508   WBC 10*3/mm3 9.10 11.87* 9.62 9.66   HEMOGLOBIN g/dL  9.6* 10.0* 10.2* 9.9*   PLATELETS 10*3/mm3 138* 155 127* 154     Results from last 7 days   Lab Units 09/12/22  0429 09/10/22  1107 09/09/22  0504 09/08/22  0500   SODIUM mmol/L 122* 127* 133* 129*   POTASSIUM mmol/L 6.6* 4.9 5.8* 5.6*   CHLORIDE mmol/L 92* 93* 99 98   CO2 mmol/L 18.3* 22.0 24.8 19.6*   BUN mg/dL 53* 29* 38* 26*   CREATININE mg/dL 4.30* 3.31* 3.93* 3.19*   GLUCOSE mg/dL 218* 210* 156* 209*   Estimated Creatinine Clearance: 13.8 mL/min (A) (by C-G formula based on SCr of 4.3 mg/dL (H)).  Results from last 7 days   Lab Units 09/12/22  0429 09/10/22  1107 09/09/22  0504 09/08/22  0500 09/07/22  0508 09/06/22  0535   CALCIUM mg/dL 8.2* 7.8* 8.3* 8.0*   < > 7.7*   ALBUMIN g/dL 3.10* 3.20* 3.50 3.00*   < > 3.30*   MAGNESIUM mg/dL  --   --   --   --   --  1.9   PHOSPHORUS mg/dL 4.9* 3.1 4.1 3.0   < > 3.2    < > = values in this interval not displayed.          amLODIPine, 5 mg, Oral, Q24H  apixaban, 5 mg, Oral, Q12H  aspirin, 81 mg, Oral, Daily  cloNIDine, 0.2 mg, Oral, Q12H  dilTIAZem CD, 120 mg, Oral, Q24H  doxycycline, 100 mg, Oral, Q12H  epoetin brooke-epbx, 10,000 Units, Subcutaneous, Once per day on Mon Wed Fri  folic acid, 1 mg, Oral, Daily  gabapentin, 100 mg, Oral, Daily With Lunch  insulin glargine, 3 Units, Subcutaneous, Nightly  insulin lispro, 0-7 Units, Subcutaneous, TID AC  levothyroxine, 250 mcg, Oral, Q AM  losartan, 100 mg, Oral, Q24H  metoprolol tartrate, 100 mg, Oral, Q12H  miconazole, 1 application, Topical, Q12H  pantoprazole, 40 mg, Oral, Daily  predniSONE, 5 mg, Oral, Daily With Breakfast  rOPINIRole, 1 mg, Oral, Nightly  sertraline, 150 mg, Oral, Daily  sodium zirconium cyclosilicate, 5 g, Oral, Daily       Diet Regular; Cardiac    Assessment & Plan      Active Hospital Problems    Diagnosis  POA   • **Acute pulmonary edema (HCC) [J81.0]  Yes   • Atrial flutter (HCC) [I48.92]  Yes   • Chronic systolic CHF (congestive heart failure) (HCC) [I50.22]  Yes   • Thrombus of venous dialysis  catheter ([facility]) [T82.868A]  Yes   • Anemia due to chronic kidney disease, on chronic dialysis ([facility]) [N18.6, D63.1, Z99.2]  Not Applicable   • CAD (coronary artery disease) [I25.10]  Yes   • ESRD (end stage renal disease) ([facility]) [N18.6]  Yes   • Hypothyroidism [E03.9]  Yes   • Type 2 diabetes mellitus with kidney complication, with long-term current use of insulin ([facility]) [E11.29, Z79.4]  Not Applicable      Resolved Hospital Problems   No resolved problems to display.       · Acute Pulmonary Edema: Volume removal with HD. Resolved.   · Left antecubital small abscess-on a 5 day course of doxycycline. Continue warm compress  · ESRD-on HD  · Atrial Flutter: on metoprolol and diltiazem for rate control. eliquis for AC.  · Dialysis catheter thrombus: Extended into atrium on prior echo. PFO noted. On chronic AC for this with eliquis.  · Acute on chronic systolic heart failure with recovery of EF-managed with dialysis  · HTN: adequate control acutely  · Hypothyroidism: TSH still very elevated. 99. This is improvement compared to last year when it was 150s. Synthroid dose increased this admission. Repeat TSH 1-2 months as outpatient.  · ACD: hgb stable at 9.6  · DM2 with severe hyperglycemia and intermittent hypoglycemia: A1c 7.7. blood glucose is improved with very low dose lantus. Will need to monitor for at least another day given that she has had recurrent hypoglycemia even with low doses of lantus and short acting insulin hasn't been giving her adequate control.  · RLS-requip increased to 1 mg qhs  · PPx: AC as above  · Disposition: HH/possibly tomorrow if no hypoglycemia     Ronnie Baptiste MD  [facility]  09/12/22  14:04 EDT    Dictated portions using Dragon dictation software.    During the entire encounter, I was wearing recommended PPE including face mask and eye protection. Hand sanitization was performed prior to entering room and upon exit.

## 2022-09-12 NOTE — NURSING NOTE
HD WITHOUT INCIDENT OR C/O.  TOLERATED WELL. SEEN PER DR. VANG DURING TX., CHANGED UF ORDERS TO REMOVE 2 L. (2 L REMOVED) . NO MEDS ADMINISTERED. RAUL CURRENT, CDI. STABLE, NO C/O UPON COMPLETION OF TREATMENT.

## 2022-09-13 ENCOUNTER — READMISSION MANAGEMENT (OUTPATIENT)
Dept: CALL CENTER | Facility: HOSPITAL | Age: 57
End: 2022-09-13

## 2022-09-13 VITALS
OXYGEN SATURATION: 95 % | HEART RATE: 57 BPM | BODY MASS INDEX: 24.34 KG/M2 | WEIGHT: 132.28 LBS | DIASTOLIC BLOOD PRESSURE: 75 MMHG | TEMPERATURE: 97.9 F | HEIGHT: 62 IN | RESPIRATION RATE: 18 BRPM | SYSTOLIC BLOOD PRESSURE: 141 MMHG

## 2022-09-13 PROBLEM — J81.0 ACUTE PULMONARY EDEMA: Status: RESOLVED | Noted: 2022-09-03 | Resolved: 2022-09-13

## 2022-09-13 LAB
ANION GAP SERPL CALCULATED.3IONS-SCNC: 13 MMOL/L (ref 5–15)
BUN SERPL-MCNC: 34 MG/DL (ref 6–20)
BUN/CREAT SERPL: 11.3 (ref 7–25)
CALCIUM SPEC-SCNC: 8.2 MG/DL (ref 8.6–10.5)
CHLORIDE SERPL-SCNC: 98 MMOL/L (ref 98–107)
CO2 SERPL-SCNC: 22 MMOL/L (ref 22–29)
CREAT SERPL-MCNC: 3 MG/DL (ref 0.57–1)
DEPRECATED RDW RBC AUTO: 52.1 FL (ref 37–54)
EGFRCR SERPLBLD CKD-EPI 2021: 17.7 ML/MIN/1.73
ERYTHROCYTE [DISTWIDTH] IN BLOOD BY AUTOMATED COUNT: 16.4 % (ref 12.3–15.4)
GLUCOSE BLDC GLUCOMTR-MCNC: 179 MG/DL (ref 70–130)
GLUCOSE BLDC GLUCOMTR-MCNC: 266 MG/DL (ref 70–130)
GLUCOSE SERPL-MCNC: 189 MG/DL (ref 65–99)
HCT VFR BLD AUTO: 32.5 % (ref 34–46.6)
HGB BLD-MCNC: 10.1 G/DL (ref 12–15.9)
MCH RBC QN AUTO: 27.2 PG (ref 26.6–33)
MCHC RBC AUTO-ENTMCNC: 31.1 G/DL (ref 31.5–35.7)
MCV RBC AUTO: 87.6 FL (ref 79–97)
PLATELET # BLD AUTO: 180 10*3/MM3 (ref 140–450)
PMV BLD AUTO: 10.5 FL (ref 6–12)
POTASSIUM SERPL-SCNC: 5.3 MMOL/L (ref 3.5–5.2)
RBC # BLD AUTO: 3.71 10*6/MM3 (ref 3.77–5.28)
SODIUM SERPL-SCNC: 133 MMOL/L (ref 136–145)
WBC NRBC COR # BLD: 8.65 10*3/MM3 (ref 3.4–10.8)

## 2022-09-13 PROCEDURE — 85027 COMPLETE CBC AUTOMATED: CPT | Performed by: STUDENT IN AN ORGANIZED HEALTH CARE EDUCATION/TRAINING PROGRAM

## 2022-09-13 PROCEDURE — 94618 PULMONARY STRESS TESTING: CPT

## 2022-09-13 PROCEDURE — 63710000001 INSULIN LISPRO (HUMAN) PER 5 UNITS: Performed by: INTERNAL MEDICINE

## 2022-09-13 PROCEDURE — 63710000001 PREDNISONE PER 5 MG: Performed by: INTERNAL MEDICINE

## 2022-09-13 PROCEDURE — 80048 BASIC METABOLIC PNL TOTAL CA: CPT | Performed by: STUDENT IN AN ORGANIZED HEALTH CARE EDUCATION/TRAINING PROGRAM

## 2022-09-13 PROCEDURE — 82962 GLUCOSE BLOOD TEST: CPT

## 2022-09-13 PROCEDURE — 94799 UNLISTED PULMONARY SVC/PX: CPT

## 2022-09-13 RX ORDER — METOPROLOL TARTRATE 100 MG/1
100 TABLET ORAL EVERY 12 HOURS SCHEDULED
Qty: 60 TABLET | Refills: 0 | Status: SHIPPED | OUTPATIENT
Start: 2022-09-13 | End: 2022-10-06 | Stop reason: HOSPADM

## 2022-09-13 RX ORDER — DOXYCYCLINE 100 MG/1
100 CAPSULE ORAL EVERY 12 HOURS SCHEDULED
Qty: 5 CAPSULE | Refills: 0 | Status: SHIPPED | OUTPATIENT
Start: 2022-09-13 | End: 2022-09-16

## 2022-09-13 RX ORDER — DILTIAZEM HYDROCHLORIDE 120 MG/1
120 CAPSULE, COATED, EXTENDED RELEASE ORAL
Qty: 30 CAPSULE | Refills: 0 | Status: SHIPPED | OUTPATIENT
Start: 2022-09-14 | End: 2022-10-06 | Stop reason: HOSPADM

## 2022-09-13 RX ORDER — ROPINIROLE 1 MG/1
1 TABLET, FILM COATED ORAL NIGHTLY
Qty: 30 TABLET | Refills: 0 | Status: ON HOLD | OUTPATIENT
Start: 2022-09-13 | End: 2022-11-24

## 2022-09-13 RX ORDER — LEVOTHYROXINE SODIUM 0.12 MG/1
250 TABLET ORAL
Qty: 60 TABLET | Refills: 0 | Status: ON HOLD | OUTPATIENT
Start: 2022-09-14 | End: 2022-10-06 | Stop reason: SDUPTHER

## 2022-09-13 RX ADMIN — Medication 1 MG: at 08:10

## 2022-09-13 RX ADMIN — GABAPENTIN 100 MG: 100 CAPSULE ORAL at 12:24

## 2022-09-13 RX ADMIN — DOXYCYCLINE 100 MG: 100 CAPSULE ORAL at 08:10

## 2022-09-13 RX ADMIN — OXYCODONE 5 MG: 5 TABLET ORAL at 15:20

## 2022-09-13 RX ADMIN — METOPROLOL TARTRATE 100 MG: 50 TABLET, FILM COATED ORAL at 08:13

## 2022-09-13 RX ADMIN — PANTOPRAZOLE SODIUM 40 MG: 40 TABLET, DELAYED RELEASE ORAL at 08:10

## 2022-09-13 RX ADMIN — CLONIDINE HYDROCHLORIDE 0.2 MG: 0.1 TABLET ORAL at 06:31

## 2022-09-13 RX ADMIN — SODIUM ZIRCONIUM CYCLOSILICATE 5 G: 5 POWDER, FOR SUSPENSION ORAL at 08:18

## 2022-09-13 RX ADMIN — LEVOTHYROXINE SODIUM 250 MCG: 0.12 TABLET ORAL at 08:14

## 2022-09-13 RX ADMIN — LOSARTAN POTASSIUM 100 MG: 100 TABLET, FILM COATED ORAL at 08:10

## 2022-09-13 RX ADMIN — DILTIAZEM HYDROCHLORIDE 120 MG: 120 CAPSULE, COATED, EXTENDED RELEASE ORAL at 08:10

## 2022-09-13 RX ADMIN — PREDNISONE 5 MG: 10 TABLET ORAL at 08:22

## 2022-09-13 RX ADMIN — APIXABAN 5 MG: 5 TABLET, FILM COATED ORAL at 08:13

## 2022-09-13 RX ADMIN — AMLODIPINE BESYLATE 5 MG: 5 TABLET ORAL at 08:10

## 2022-09-13 RX ADMIN — ASPIRIN 81 MG: 81 TABLET, COATED ORAL at 08:10

## 2022-09-13 RX ADMIN — SERTRALINE 150 MG: 100 TABLET, FILM COATED ORAL at 08:13

## 2022-09-13 RX ADMIN — CLONIDINE HYDROCHLORIDE 0.2 MG: 0.1 TABLET ORAL at 16:29

## 2022-09-13 RX ADMIN — INSULIN LISPRO 4 UNITS: 100 INJECTION, SOLUTION INTRAVENOUS; SUBCUTANEOUS at 12:23

## 2022-09-13 RX ADMIN — INSULIN LISPRO 2 UNITS: 100 INJECTION, SOLUTION INTRAVENOUS; SUBCUTANEOUS at 08:13

## 2022-09-13 NOTE — DISCHARGE SUMMARY
Patient Name: Zaina Martinez  : 1965  MRN: [id number]    Date of Admission: 9/3/2022  Date of Discharge:  2022  Primary Care Physician: Jane Kyle NP      Chief Complaint:   Shortness of Breath      Discharge Diagnoses     Active Hospital Problems    Diagnosis  POA   • Atrial flutter (HCC) [I48.92]  Yes   • Chronic systolic CHF (congestive heart failure) (HCC) [I50.22]  Yes   • Thrombus of venous dialysis catheter (HCC) [T82.868A]  Yes   • Anemia due to chronic kidney disease, on chronic dialysis (HCC) [N18.6, D63.1, Z99.2]  Not Applicable   • CAD (coronary artery disease) [I25.10]  Yes   • ESRD (end stage renal disease) (HCC) [N18.6]  Yes   • Hypothyroidism [E03.9]  Yes   • Type 2 diabetes mellitus with kidney complication, with long-term current use of insulin (HCC) [E11.29, Z79.4]  Not Applicable      Resolved Hospital Problems    Diagnosis Date Resolved POA   • **Acute pulmonary edema (HCC) [J81.0] 2022 Yes        Hospital Course     Ms. Martinez is a 56 y.o. female with a history of ESRD on HD with a dialysis catheter thrombus on anticoagulation, chronic systolic heart failure, rheumatoid arthritis on chronic prednisone, hypertension, hyperlipidemia, hypothyroidism, atrial flutter, history of cva who presented to [facility] initially complaining of shortness of breath.  Please see the admitting history and physical for further details.  She was found to have acute pulmonary edema from an acute on chronic systolic heart failure exacerbation and supraventricular tachycardia related to her atrial flutter was admitted to the hospital for further evaluation and treatment.      She was seen in consultation by nephrology and cardiology in consultation. Her volume overload was treated with HD and cardiology managed her heart rate with metoprolol and diltiazem. Her TSH was found to be elevated, though improved from a prior admission, and her synthroid was increased. Her course was  complicated by both recurrent hypoglycemia severe hyperglycemia (when insulin was removed) and her insulin dosage was reduced significantly to avoid both. She also developed a small left antecubital abscess at an iv site which was treated successfully with oral doxycycline and warm compress. She is improved significantly since admission and we will test her for any possible oxygen requirement prior to discharge. She will be discharged home today with home health.     Day of Discharge     Subjective:  No events overnight. Her cutaneous abscess is nearly resolved. She has no complaints. Blood sugars look better and no hypoglycemia    Physical Exam:  Temp:  [97.4 °F (36.3 °C)-97.9 °F (36.6 °C)] 97.9 °F (36.6 °C)  Heart Rate:  [] 103  Resp:  [16-18] 18  BP: (137-154)/() 141/75  Body mass index is 24.19 kg/m².  Physical Exam  Constitutional:       General: She is not in acute distress.     Appearance: She is not toxic-appearing.   Cardiovascular:      Rate and Rhythm: Normal rate. Rhythm irregular.      Heart sounds: Normal heart sounds.   Pulmonary:      Effort: Pulmonary effort is normal.      Breath sounds: Normal breath sounds.   Abdominal:      General: Bowel sounds are normal.      Palpations: Abdomen is soft.   Musculoskeletal:         General: No tenderness.      Right lower leg: No edema.      Left lower leg: No edema.   Skin:     Comments: Left antecubital abscess nearly resolved   Neurological:      Mental Status: She is alert.   Psychiatric:         Mood and Affect: Mood normal.         Behavior: Behavior normal.         Consultants     Consult Orders (all) (From admission, onward)     Start     Ordered    09/06/22 1436  Inpatient Cardiology Consult  Once        Specialty:  Cardiology  Provider:  Tremayne Toledo MD    09/06/22 1440    09/04/22 1344  Inpatient Cardiology Consult  Once        Specialty:  Cardiology  Provider:  Bradley Montoya MD    09/04/22 1343    09/03/22 1526  [facility]  (on-call MD unless specified) Details  Once        Specialty:  Hospitalist  Provider:  Starla Boston MD    09/03/22 1525    09/03/22 1526  Nephrology (on -call MD unless specified)  Once        Specialty:  Nephrology  Provider:  Alejo Lange MD    09/03/22 1525              Procedures     Imaging Results (All)     Procedure Component Value Units Date/Time    XR Chest 1 View [[id number]] Collected: 09/03/22 1358     Updated: 09/03/22 1404    Narrative:      CHEST SINGLE VIEW     HISTORY: Dyspnea, increasing shortness of air.     COMPARISON: Two-view chest 08/25/2022.     FINDINGS: There is cardiomegaly and vascular engorgement with mild  perihilar hydrostatic pulmonary edema. Moderate left and small right  pleural effusions are present and there is opacity left lung base due to  atelectasis or infiltrate. A dual lumen right central venous catheter  extends to the right atrium.       Impression:      Findings of mild CHF with cardiomegaly and vascular  engorgement and mild perihilar hydrostatic edema. Left greater than  right pleural effusions with unchanged left basilar opacity. No  pneumothorax.     This report was finalized on 9/3/2022 2:01 PM by Dr. Aguila Pulido M.D.             Pertinent Labs     Results from last 7 days   Lab Units 09/13/22  0456 09/12/22  0430 09/10/22  0523 09/08/22  0500   WBC 10*3/mm3 8.65 9.10 11.87* 9.62   HEMOGLOBIN g/dL 10.1* 9.6* 10.0* 10.2*   PLATELETS 10*3/mm3 180 138* 155 127*     Results from last 7 days   Lab Units 09/13/22  0456 09/12/22  0429 09/10/22  1107 09/09/22  0504   SODIUM mmol/L 133* 122* 127* 133*   POTASSIUM mmol/L 5.3* 6.6* 4.9 5.8*   CHLORIDE mmol/L 98 92* 93* 99   CO2 mmol/L 22.0 18.3* 22.0 24.8   BUN mg/dL 34* 53* 29* 38*   CREATININE mg/dL 3.00* 4.30* 3.31* 3.93*   GLUCOSE mg/dL 189* 218* 210* 156*   Estimated Creatinine Clearance: 19.8 mL/min (A) (by C-G formula based on SCr of 3 mg/dL (H)).  Results from last 7 days   Lab Units  09/12/22  0429 09/10/22  1107 09/09/22  0504 09/08/22  0500   ALBUMIN g/dL 3.10* 3.20* 3.50 3.00*     Results from last 7 days   Lab Units 09/13/22  0456 09/12/22  0429 09/10/22  1107 09/09/22  0504 09/08/22  0500   CALCIUM mg/dL 8.2* 8.2* 7.8* 8.3* 8.0*   ALBUMIN g/dL  --  3.10* 3.20* 3.50 3.00*   PHOSPHORUS mg/dL  --  4.9* 3.1 4.1 3.0               Invalid input(s): LDLCALC        Test Results Pending at Discharge       Discharge Details        Discharge Medications      New Medications      Instructions Start Date   dilTIAZem  MG 24 hr capsule  Commonly known as: CARDIZEM CD   120 mg, Oral, Every 24 Hours Scheduled   Start Date: September 14, 2022     doxycycline 100 MG capsule  Commonly known as: MONODOX   100 mg, Oral, Every 12 Hours Scheduled      metoprolol tartrate 100 MG tablet  Commonly known as: LOPRESSOR   100 mg, Oral, Every 12 Hours Scheduled         Changes to Medications      Instructions Start Date   epoetin brooke-epbx 45287 UNIT/ML injection  Commonly known as: RETACRIT  What changed: additional instructions   10,000 Units, Subcutaneous, 3 Times Weekly      gabapentin 100 MG capsule  Commonly known as: NEURONTIN  What changed:   how much to take  how to take this  when to take this   Take one tab po in afternoon daily      insulin glargine 100 UNIT/ML injection  Commonly known as: LANTUS SEMGLEE  What changed:   how much to take  when to take this   3 Units, Subcutaneous, Nightly      insulin lispro 100 UNIT/ML injection  Commonly known as: ADMELOG  What changed: additional instructions   0-14 Units, Subcutaneous, 3 Times Daily Before Meals      levothyroxine 125 MCG tablet  Commonly known as: SYNTHROID, LEVOTHROID  What changed:   medication strength  how much to take   250 mcg, Oral, Every Early Morning   Start Date: September 14, 2022     miconazole 2 % cream  Commonly known as: MICOTIN  What changed: additional instructions   1 application, Topical, Every 12 Hours Scheduled      oxyCODONE  5 MG immediate release tablet  Commonly known as: ROXICODONE  What changed: additional instructions   5 mg, Oral, Every 4 Hours PRN, Taper off over time      predniSONE 5 MG tablet  Commonly known as: DELTASONE  What changed:   how much to take  how to take this  when to take this   Two tabs po with breakfast July 26-July 28, 1.5 tabs with breakfast July 29-August 4, then 1 tab daily starting Friday August 5, 2022 and continue on that dose      rOPINIRole 1 MG tablet  Commonly known as: REQUIP  What changed:   medication strength  how much to take  when to take this  additional instructions   1 mg, Oral, Nightly, Take 1 hour before bedtime.         Continue These Medications      Instructions Start Date   amLODIPine 5 MG tablet  Commonly known as: NORVASC   5 mg, Oral, 2 Times Daily      aspirin 81 MG EC tablet   81 mg, Oral, Daily      cloNIDine 0.1 MG tablet  Commonly known as: CATAPRES   0.1 mg, Oral, Every 8 Hours Scheduled      Eliquis 5 MG tablet tablet  Generic drug: apixaban   5 mg, Oral, Every 12 Hours Scheduled      folic acid 1 MG tablet  Commonly known as: FOLVITE   1 mg, Oral, Daily      losartan 100 MG tablet  Commonly known as: COZAAR   100 mg, Oral, Every 24 Hours Scheduled      pantoprazole 40 MG EC tablet  Commonly known as: PROTONIX   40 mg, Oral, Daily      polyethylene glycol 17 g packet  Commonly known as: MIRALAX   17 g, Oral, Daily PRN      sertraline 50 MG tablet  Commonly known as: ZOLOFT   150 mg, Oral, Daily      sodium zirconium cyclosilicate 5 g pack  Commonly known as: LOKELMA   5 g, Oral, Daily         Stop These Medications    hydrALAZINE 10 MG tablet  Commonly known as: APRESOLINE     hydrALAZINE 100 MG tablet  Commonly known as: APRESOLINE     spironolactone 100 MG tablet  Commonly known as: ALDACTONE     tamsulosin 0.4 MG capsule 24 hr capsule  Commonly known as: FLOMAX            Allergies   Allergen Reactions   • Contrast Dye Confusion   • Ibuprofen Other (See Comments)      Kidney disease   • Prochlorperazine Other (See Comments)     Dystonic reaction              Discharge Disposition:  Home-Health Care Svc    Discharge Diet:  Diet Order   Procedures   • Diet Regular; Cardiac       Discharge Activity:   Activity Instructions     Activity as Tolerated            CODE STATUS:    Code Status and Medical Interventions:   Ordered at: 09/03/22 1949     Code Status (Patient has no pulse and is not breathing):    CPR (Attempt to Resuscitate)     Medical Interventions (Patient has pulse or is breathing):    Full       No future appointments.  Additional Instructions for the Follow-ups that You Need to Schedule     Ambulatory Referral to Home Health   As directed      Face to Face Visit Date: 9/13/2022    Follow-up provider for Plan of Care?: I treated the patient in an acute care facility and will not continue treatment after discharge.    Follow-up provider: ILSA JACOBO [[id number]]    Reason/Clinical Findings: debility/hospitalization    Describe mobility limitations that make leaving home difficult: debility/hospitalization    Nursing/Therapeutic Services Requested: Skilled Nursing Physical Therapy    Skilled nursing orders: Medication education Cardiopulmonary assessments    PT orders: Transfer training Therapeutic exercise Strengthening Home safety assessment    Frequency: 1 Week 1         Call MD With Problems / Concerns   As directed      Instructions: return to the hospital if you experience chest pain, shortness of breath, abdominal pain, nausea, vomiting, fevers, sweats, chills, or worsening of your symptoms    Order Comments: Instructions: return to the hospital if you experience chest pain, shortness of breath, abdominal pain, nausea, vomiting, fevers, sweats, chills, or worsening of your symptoms          Discharge Follow-up with PCP   As directed       Currently Documented PCP:    Ilsa Jacobo NP    PCP Phone Number:    [phone]     Follow Up Details: 2 weeks          Discharge Follow-up with Specialty: cardiology, 2 weeks or as otherwise directed   As directed      Specialty: cardiology, 2 weeks or as otherwise directed         Discharge Follow-up with Specified Provider: nephrology, as directed   As directed      To: nephrology, as directed            Contact information for follow-up providers     Ilsa Kyle NP .    Specialty: Family Medicine  Why: 2 weeks  Contact information:  [address], [address]  [place] [address]  [phone]                   Contact information for after-discharge care     Durable Medical Equipment     [facility] .    Service: Durable Medical Equipment  Contact information:  [address]  [facility] [address]  [phone]                 [facility]     [facility]    Service: [facility] Services  Contact information:  [address]  [facility] [address]  [phone]                             Additional Instructions for the Follow-ups that You Need to Schedule     Ambulatory Referral to Home Health   As directed      Face to Face Visit Date: 9/13/2022    Follow-up provider for Plan of Care?: I treated the patient in an acute care facility and will not continue treatment after discharge.    Follow-up provider: ILSA KYLE [[id number]]    Reason/Clinical Findings: debility/hospitalization    Describe mobility limitations that make leaving home difficult: debility/hospitalization    Nursing/Therapeutic Services Requested: Skilled Nursing Physical Therapy    Skilled nursing orders: Medication education Cardiopulmonary assessments    PT orders: Transfer training Therapeutic exercise Strengthening Home safety assessment    Frequency: 1 Week 1         Call MD With Problems / Concerns   As directed      Instructions: return to the hospital if you experience chest pain, shortness of breath, abdominal pain, nausea, vomiting, fevers, sweats, chills, or worsening of your  symptoms    Order Comments: Instructions: return to the hospital if you experience chest pain, shortness of breath, abdominal pain, nausea, vomiting, fevers, sweats, chills, or worsening of your symptoms          Discharge Follow-up with PCP   As directed       Currently Documented PCP:    Jane Kyle NP    PCP Phone Number:    [phone]     Follow Up Details: 2 weeks         Discharge Follow-up with Specialty: cardiology, 2 weeks or as otherwise directed   As directed      Specialty: cardiology, 2 weeks or as otherwise directed         Discharge Follow-up with Specified Provider: nephrology, as directed   As directed      To: nephrology, as directed           Time Spent on Discharge:  Greater than 30 minutes      Ronnie Baptiste MD  [facility]  09/13/22  13:30 EDT

## 2022-09-13 NOTE — PROGRESS NOTES
Nutrition Services    Patient Name:  Zaina Martinez  YOB: 1965  MRN: [id number]  Admit Date:  9/3/2022    Comment:  Nutrition screen completed for Length of stay.  Pt on Regular Cardiac diet.  She states appetite is picking up and she is interested in a Boost Glucose Supplement.  Will follow and remain available as needed.    CLINICAL NUTRITION ASSESSMENT      Reason for Assessment Length of Stay     H&P    Past Medical History:   Diagnosis Date   • Acute CVA (cerebrovascular accident) ([facility]) 5/1/2022   • Acute on chronic diastolic CHF (congestive heart failure) ([facility])    • CAD (coronary artery disease) 12/20/2021   • Diabetes ([facility])    • Disease of thyroid gland    • GERD (gastroesophageal reflux disease)    • Hyperlipidemia 11/30/2018   • Hypertension    • Rheumatoid arthritis ([facility])        Past Surgical History:   Procedure Laterality Date   • CHOLECYSTECTOMY WITH INTRAOPERATIVE CHOLANGIOGRAM N/A 1/10/2022    Procedure: Laparoscopic cholecystectomy with intraoperative cholangiogram;  Surgeon: Ramana Raygoza MD;  Location: [facility];  Service: General;  Laterality: N/A;   • EYE SURGERY     • HYSTERECTOMY     • INSERTION HEMODIALYSIS CATHETER N/A 12/6/2021    Procedure: HEMODIALYSIS CATHETER INSERTION;  Surgeon: Keli Salazar MD;  Location: [facility] 18/19;  Service: Vascular;  Laterality: N/A;   • INSERTION HEMODIALYSIS CATHETER N/A 5/3/2022    Procedure: TUNNELED CATHETER PLACEMENT;  Surgeon: Keli Salazra MD;  Location: [facility];  Service: Vascular;  Laterality: N/A;   • MUSCLE BIOPSY Left 6/15/2022    Procedure: Left quadriceps muscle biopsy;  Surgeon: Marques Ma MD;  Location: [facility];  Service: Neurosurgery;  Laterality: Left;        Current Problem · Pulmonary Edema, ESRD,CHF, DM, Left antecubital small abscess         Encounter Information        Nutrition/Diet History:  Fair intake   Food Preferences: Eggs, fruit   Supplements: Likes Boost Glucose  "  Factors Affecting Intake: decreased appetite     Anthropometrics        Current Height  Current Weight  BMI kg/m2 Height: 157.5 cm (62.01\")  Weight: 60 kg (132 lb 4.4 oz) (09/13/22 0500)  Body mass index is 24.19 kg/m².       Admission Weight    Ideal Body Weight (IBW) 50.1 kg   Usual Body Weight (UBW)    Weight Change/Trend        Weight History Wt Readings from Last 30 Encounters:   09/13/22 0500 60 kg (132 lb 4.4 oz)   09/11/22 0548 59.8 kg (131 lb 14.4 oz)   09/10/22 0530 56.7 kg (125 lb 1.6 oz)   09/09/22 0500 55.4 kg (122 lb 1.6 oz)   09/08/22 0511 59.5 kg (131 lb 3.2 oz)   09/07/22 1230 54.5 kg (120 lb 2.4 oz)   09/07/22 0640 56.2 kg (124 lb)   09/06/22 0539 55.5 kg (122 lb 6.4 oz)   09/05/22 0505 58.4 kg (128 lb 12.8 oz)   09/04/22 0500 62.6 kg (138 lb 1.6 oz)   09/03/22 1708 64.9 kg (143 lb 1.3 oz)   09/03/22 1355 64.9 kg (143 lb 1.3 oz)   08/27/22 0527 64.9 kg (143 lb 1.6 oz)   08/26/22 0942 64.9 kg (143 lb)   08/26/22 0500 66.6 kg (146 lb 12.8 oz)   08/25/22 0810 67 kg (147 lb 11.3 oz)   08/25/22 0500 67.1 kg (147 lb 14.4 oz)   08/24/22 0518 67.7 kg (149 lb 4 oz)   08/23/22 0526 66.8 kg (147 lb 4.3 oz)   08/22/22 0521 67.5 kg (148 lb 14.4 oz)   08/21/22 0846 65.3 kg (144 lb)   08/21/22 0350 65.4 kg (144 lb 3.2 oz)   08/20/22 0516 65 kg (143 lb 3.2 oz)   08/04/22 0511 65.1 kg (143 lb 8.3 oz)   08/03/22 0504 66.8 kg (147 lb 4.3 oz)   08/02/22 0602 65.4 kg (144 lb 2.9 oz)   08/01/22 1557 62.5 kg (137 lb 12.6 oz)   08/01/22 0700 68.3 kg (150 lb 9.2 oz)   07/31/22 0500 64.3 kg (141 lb 12.1 oz)   07/30/22 0638 65.2 kg (143 lb 11.8 oz)   07/29/22 1532 66.7 kg (147 lb)   07/29/22 0548 66.7 kg (147 lb 0.8 oz)   07/28/22 0610 65 kg (143 lb 4.8 oz)   07/27/22 2055 65.6 kg (144 lb 9.6 oz)   07/22/22 1542 66.3 kg (146 lb 2.6 oz)   07/22/22 0632 69.3 kg (152 lb 12.5 oz)   07/21/22 0539 69.7 kg (153 lb 10.6 oz)   07/18/22 0842 71.1 kg (156 lb 11.2 oz)   07/17/22 0549 68.6 kg (151 lb 3.8 oz)   07/16/22 0633 67 kg (147 " lb 12.8 oz)   07/14/22 1738 69.9 kg (154 lb)   07/13/22 1041 61.7 kg (136 lb 1.6 oz)   07/04/22 1756 75.5 kg (166 lb 7.2 oz)   07/01/22 1700 73.9 kg (163 lb)   06/15/22 1100 62.5 kg (137 lb 12.6 oz)   06/12/22 0743 66.2 kg (145 lb 15.1 oz)   05/26/22 1700 64.9 kg (143 lb 1.3 oz)   05/24/22 1726 64.5 kg (142 lb 3.2 oz)   05/16/22 0817 54.2 kg (119 lb 7.8 oz)   05/13/22 1911 54.2 kg (119 lb 7.8 oz)   05/13/22 1300 51 kg (112 lb 7 oz)   05/13/22 0700 54.7 kg (120 lb 8 oz)   05/12/22 0514 55.9 kg (123 lb 3.8 oz)   05/11/22 0535 54.1 kg (119 lb 4.3 oz)   05/10/22 0512 52.9 kg (116 lb 9.6 oz)   05/09/22 0535 56.3 kg (124 lb 3.2 oz)   05/08/22 0614 54.7 kg (120 lb 9.5 oz)   05/07/22 0552 48.2 kg (106 lb 4.2 oz)   05/06/22 0500 46 kg (101 lb 6.4 oz)   05/05/22 1154 46.2 kg (101 lb 13.6 oz)   05/05/22 0647 54.5 kg (120 lb 2.4 oz)   05/04/22 0300 57.2 kg (126 lb 1.7 oz)   05/03/22 0520 59.9 kg (132 lb 0.9 oz)   05/01/22 1135 57.8 kg (127 lb 6.8 oz)   05/01/22 0600 57.8 kg (127 lb 6.8 oz)   04/29/22 1900 50.3 kg (110 lb 14.3 oz)   04/29/22 0519 50.3 kg (110 lb 12.8 oz)   04/28/22 0558 55.9 kg (123 lb 3.8 oz)   04/28/22 0427 54.8 kg (120 lb 13 oz)   04/28/22 1511 55.8 kg (123 lb)   03/02/22 1615 62.4 kg (137 lb 9.1 oz)   03/02/22 0020 64.4 kg (141 lb 15.6 oz)   03/01/22 0434 62 kg (136 lb 11.2 oz)   02/28/22 0413 62.2 kg (137 lb 3.2 oz)   02/27/22 0644 64.1 kg (141 lb 5 oz)   02/26/22 0500 65.1 kg (143 lb 8.3 oz)   02/25/22 0500 65.4 kg (144 lb 2.9 oz)   02/24/22 0500 65.9 kg (145 lb 4.5 oz)   02/23/22 2300 64.9 kg (143 lb 1.3 oz)   01/24/22 0814 71.4 kg (157 lb 6.4 oz)   01/15/22 0500 71.2 kg (156 lb 15.5 oz)   01/14/22 0500 69.6 kg (153 lb 7 oz)   01/13/22 0500 69.7 kg (153 lb 10.6 oz)   01/12/22 0500 69.9 kg (154 lb 1.6 oz)   01/11/22 0500 86.3 kg (190 lb 4.1 oz)   01/10/22 0700 82.3 kg (181 lb 8 oz)   01/09/22 1808 77.6 kg (171 lb)   01/09/22 1006 79.4 kg (175 lb)   12/29/21 0508 71.8 kg (158 lb 3.2 oz)   12/28/21 1251 73.7  kg (162 lb 7.7 oz)   12/28/21 0513 73.7 kg (162 lb 7.7 oz)   12/27/21 0500 67.7 kg (149 lb 3.2 oz)   12/26/21 1053 70.7 kg (155 lb 13.8 oz)   12/26/21 0500 73.8 kg (162 lb 9.6 oz)   12/25/21 0457 72.3 kg (159 lb 4.8 oz)   12/24/21 0500 72.6 kg (160 lb 1.6 oz)   12/23/21 0500 75.9 kg (167 lb 6.4 oz)   12/22/21 0500 73.9 kg (163 lb)   12/20/21 1100 70.3 kg (155 lb)   12/19/21 1338 69.9 kg (154 lb)   12/17/21 0449 69.9 kg (154 lb)   12/16/21 1300 70.2 kg (154 lb 12.2 oz)   12/16/21 0306 72.3 kg (159 lb 4.8 oz)   12/15/21 1531 70.2 kg (154 lb 12.8 oz)   12/01/21 2300 61.2 kg (135 lb)   11/30/21 1252 59 kg (130 lb)   11/30/21 0200 59 kg (130 lb)                 Tests/Procedures        Tests/Procedures      Labs       Pertinent Labs    Results from last 7 days   Lab Units 09/13/22  0456 09/12/22  0429 09/10/22  1107   SODIUM mmol/L 133* 122* 127*   POTASSIUM mmol/L 5.3* 6.6* 4.9   CHLORIDE mmol/L 98 92* 93*   CO2 mmol/L 22.0 18.3* 22.0   BUN mg/dL 34* 53* 29*   CREATININE mg/dL 3.00* 4.30* 3.31*   CALCIUM mg/dL 8.2* 8.2* 7.8*   GLUCOSE mg/dL 189* 218* 210*     Results from last 7 days   Lab Units 09/13/22  0456 09/12/22  0430 09/12/22  0429   PHOSPHORUS mg/dL  --   --  4.9*   HEMOGLOBIN g/dL 10.1*   < >  --    HEMATOCRIT % 32.5*   < >  --    WBC 10*3/mm3 8.65   < >  --     < > = values in this interval not displayed.     Results from last 7 days   Lab Units 09/13/22  0456 09/12/22  0430 09/10/22  0523 09/08/22  0500 09/07/22  0508   PLATELETS 10*3/mm3 180 138* 155 127* 154     COVID19   Date Value Ref Range Status   09/03/2022 Not Detected Not Detected - Ref. Range Final     Lab Results   Component Value Date    HGBA1C 7.70 (H) 09/04/2022          Medications           Scheduled Medications amLODIPine, 5 mg, Oral, Q24H  apixaban, 5 mg, Oral, Q12H  aspirin, 81 mg, Oral, Daily  cloNIDine, 0.2 mg, Oral, Q12H  dilTIAZem CD, 120 mg, Oral, Q24H  doxycycline, 100 mg, Oral, Q12H  epoetin brooke-epbx, 10,000 Units, Subcutaneous,  Once per day on Mon Wed Fri  folic acid, 1 mg, Oral, Daily  gabapentin, 100 mg, Oral, Daily With Lunch  insulin glargine, 3 Units, Subcutaneous, Nightly  insulin lispro, 0-7 Units, Subcutaneous, TID AC  levothyroxine, 250 mcg, Oral, Q AM  losartan, 100 mg, Oral, Q24H  metoprolol tartrate, 100 mg, Oral, Q12H  miconazole, 1 application, Topical, Q12H  pantoprazole, 40 mg, Oral, Daily  predniSONE, 5 mg, Oral, Daily With Breakfast  rOPINIRole, 1 mg, Oral, Nightly  sertraline, 150 mg, Oral, Daily  sodium zirconium cyclosilicate, 5 g, Oral, Daily       Infusions     PRN Medications •  acetaminophen  •  albumin human  •  dextrose  •  dextrose  •  glucagon (human recombinant)  •  heparin (porcine)  •  hydrALAZINE  •  hydrOXYzine  •  melatonin  •  metoprolol tartrate  •  nitroglycerin  •  ondansetron **OR** ondansetron  •  oxyCODONE  •  polyethylene glycol     Physical Findings        Physical Appearance alert     NFPE Not applicable   --  Edema  1+ (trace)   Gastrointestinal last bowel movement:9/9   Tubes/Drains tunneled dialysis catheter   Oral/Mouth Cavity WNL   Skin bruising   --  Current Nutrition Orders & Evaluation of Intake       Oral Nutrition     Food Allergies NKFA   Current PO Diet Diet Regular; Cardiac   Supplement    PO Evaluation     Trending % PO Intake        --    PES STATEMENT / NUTRITION DIAGNOSIS      Nutrition Dx Problem  Problem: Predicted Suboptimal Intake  Etiology: Medical Diagnosis  Signs/Symptoms:     Comment:    --    NUTRITION INTERVENTION      Intervention Goal(s) Maintain nutrition status         RD Intervention/Action Interview for preferences and Supplement provided         Prescription/Orders:       PO Diet       Supplements       Enteral Nutrition       Parenteral Nutrition    New Prescription Ordered?    --      Monitor/Evaluation Per protocol   Education Will instruct as appropriate   --    RD to follow per protocol.      Electronically signed by:  Dang Morel RD  09/13/22 10:26  EDT

## 2022-09-13 NOTE — SIGNIFICANT NOTE
Exercise Oximetry    Patient Name:Zaina Martinez   MRN: [id number]   Date: 09/13/22             ROOM AIR BASELINE   SpO2%    93   Heart Rate     57   Blood Pressure         EXERCISE ON ROOM AIR SpO2% EXERCISE ON O2 @     LPM SpO2%   1 MINUTE    96 1 MINUTE    2 MINUTES    97 2 MINUTES    3 MINUTES    97 3 MINUTES    4 MINUTES    97 4 MINUTES    5 MINUTES     5 MINUTES    6 MINUTES     6 MINUTES               Distance Walked      Distance Walked   Dyspnea (Emily Scale)      Dyspnea (Emily Scale)   Fatigue (Emily Scale)      Fatigue (Emily Scale)   SpO2% Post Exercise     95 SpO2% Post Exercise   HR Post Exercise            58 HR Post Exercise   Time to Recovery      Time to Recovery     Comments:    Pt sats never fell bellow 96% during walk. Pt walked with the assistance of a walker. Twice pt complained of leg weakness and walk was ended after 4 minutes.

## 2022-09-13 NOTE — CASE MANAGEMENT/SOCIAL WORK
Continued Stay Note  [facility]     Patient Name: Zaina Martinez  MRN: [id number]  Today's Date: 9/13/2022    Admit Date: 9/3/2022     Discharge Plan     Row Name 09/13/22 1457       Plan    Plan Home with [facility] and family to transport    Patient/Family in Agreement with Plan yes    Plan Comments Reviewed RT note, pt does not qualify for home o2. Notified [facility], no o2 at dc. Gaye REYES/CCP               Discharge Codes    No documentation.               Expected Discharge Date and Time     Expected Discharge Date Expected Discharge Time    Sep 13, 2022             Guillermina Duffy, RN

## 2022-09-13 NOTE — PLAN OF CARE
Goal Outcome Evaluation:           Progress: no change  Outcome Evaluation: Alert and oriented and able to make needs known. Received HD yesterday and tolerated well. She c/o having pain rt restless leg and was given prn meds that were effective. Currently on 02 @ 2lpm via NC. Right tunnel cath in place. Purewick with thick tea colored urine present. WCTM as needed.

## 2022-09-14 NOTE — OUTREACH NOTE
Prep Survey    Flowsheet Row Responses   [facility] patient discharged from? [facility]   Is LACE score < 7 ? No   Emergency Room discharge w/ pulse ox? No   Eligibility Readm Mgmt   Discharge diagnosis atrial flutter and chronic CHF   Does the patient have one of the following disease processes/diagnoses(primary or secondary)? CHF   Does the patient have Home health ordered? Yes   What is the Home health agency?  [facility]   Is there a DME ordered? Yes   What DME was ordered? [employer]   Prep survey completed? Yes          GENA ARMAS - Registered Nurse

## 2022-09-20 ENCOUNTER — READMISSION MANAGEMENT (OUTPATIENT)
Dept: CALL CENTER | Facility: HOSPITAL | Age: 57
End: 2022-09-20

## 2022-09-20 NOTE — OUTREACH NOTE
CHF Week 1 Survey    Flowsheet Row Responses   [facility] patient discharged from? [place]   Does the patient have one of the following disease processes/diagnoses(primary or secondary)? CHF   CHF Week 1 attempt successful? Yes   Call start time 0904   Call end time 0922   General alerts for this patient HD--M,W, FR   Discharge diagnosis atrial flutter and chronic CHF   Meds reviewed with patient/caregiver? Yes   Is the patient having any side effects they believe may be caused by any medication additions or changes? No   Does the patient have all medications ordered at discharge? No   What is keeping the patient from filling the prescriptions? --  [Patient had not picked up ATB as not approved at first but has since been authorized, advised to pickup today if possible. ]   Nursing Interventions Nurse provided patient education   Is the patient taking all medications as directed (includes completed medication regime)? Yes   Does the patient have a primary care provider?  Yes   Does the patient have an appointment with their PCP within 7 days of discharge? Yes   Comments regarding PCP Patient has home visits with APRN with one scheduled on 9/20/22   Has the patient kept scheduled appointments due by today? Yes  [reports she has been compliant with HD]   Comments Patient unaware of names of her cardiologist and nephrologist and if she had seen on OP basis,  provided her names of MDs that consulted while in hospital but encouraged to discuss with her PCP today for assistance as she has no recall, referrals as needed.     [facility]   Has home health visited the patient within 72 hours of discharge? Call prior to 72 hours   Psychosocial issues? No   Did the patient receive a copy of their discharge instructions? Yes   Nursing interventions Reviewed instructions with patient   What is the patient's perception of their health status since discharge? Same  [Patient reports she had some  swelling/pain to abdomen despite 3.5 liter removal yesterday at HD--pt has a f/u today with her PCP and HD tomorrow.]   Nursing interventions Nurse provided patient education  [Encouraged compliance with diet recommended and scheduling her f/u appts, patient is having some issues recalling her nephro at time of call-]   Is the patient able to teach back signs and symptoms of worsening condition? (i.e. weight gain, shortness of air, etc.) Yes  [Reviewed with patient]   Is the patient/caregiver able to teach back the hierarchy of who to call/visit for symptoms/problems? PCP, Specialist, Home health nurse, Urgent Care, ED, 911 Yes   Is the patient able to teach back Heart Failure Zones? Yes   CHF Nursing Interventions Education provided on various zones   CHF Zone this Call Yellow Zone   Green Zone Patient reports doing well, No new or worsening shortness of breath, Physical activity level is normal for you, No new swelling -  feet, ankles and legs look normal for you, Weight check stable, No chest pain   Yellow Zone Discomfort or swelling in the abdomen, Worsening shortness of breath with activity  [Patient will have a visit with PCP today and will discuss, HD tomorrow]   Yellow Zone Interventions Consider contacting your doctor or health care team, Consider asking your health care team about a change in medications    CHF Week 1 call completed? Yes   Call end time 0922          SAVANNAH MAJOR - Registered Nurse

## 2022-09-26 ENCOUNTER — HOSPITAL ENCOUNTER (INPATIENT)
Facility: HOSPITAL | Age: 57
LOS: 9 days | Discharge: HOME HEALTH | End: 2022-10-06
Attending: EMERGENCY MEDICINE | Admitting: STUDENT IN AN ORGANIZED HEALTH CARE EDUCATION/TRAINING PROGRAM

## 2022-09-26 ENCOUNTER — APPOINTMENT (OUTPATIENT)
Dept: GENERAL RADIOLOGY | Facility: HOSPITAL | Age: 57
End: 2022-09-26

## 2022-09-26 DIAGNOSIS — N18.6 END STAGE RENAL DISEASE: ICD-10-CM

## 2022-09-26 DIAGNOSIS — J81.0 ACUTE PULMONARY EDEMA: ICD-10-CM

## 2022-09-26 DIAGNOSIS — G93.41 ACUTE METABOLIC ENCEPHALOPATHY: Primary | ICD-10-CM

## 2022-09-26 LAB
ALBUMIN SERPL-MCNC: 3.8 G/DL (ref 3.5–5.2)
ALBUMIN/GLOB SERPL: 1.5 G/DL
ALP SERPL-CCNC: 213 U/L (ref 39–117)
ALT SERPL W P-5'-P-CCNC: 19 U/L (ref 1–33)
ANION GAP SERPL CALCULATED.3IONS-SCNC: 20.4 MMOL/L (ref 5–15)
ARTERIAL PATENCY WRIST A: POSITIVE
AST SERPL-CCNC: 25 U/L (ref 1–32)
ATMOSPHERIC PRESS: 751.9 MMHG
BASE EXCESS BLDA CALC-SCNC: -1.1 MMOL/L (ref 0–2)
BASOPHILS # BLD AUTO: 0.06 10*3/MM3 (ref 0–0.2)
BASOPHILS NFR BLD AUTO: 0.4 % (ref 0–1.5)
BDY SITE: ABNORMAL
BILIRUB SERPL-MCNC: 0.6 MG/DL (ref 0–1.2)
BUN SERPL-MCNC: 44 MG/DL (ref 6–20)
BUN/CREAT SERPL: 9.2 (ref 7–25)
CALCIUM SPEC-SCNC: 8.6 MG/DL (ref 8.6–10.5)
CHLORIDE SERPL-SCNC: 90 MMOL/L (ref 98–107)
CO2 SERPL-SCNC: 24.6 MMOL/L (ref 22–29)
CREAT SERPL-MCNC: 4.8 MG/DL (ref 0.57–1)
DEPRECATED RDW RBC AUTO: 49.1 FL (ref 37–54)
EGFRCR SERPLBLD CKD-EPI 2021: 10.1 ML/MIN/1.73
EOSINOPHIL # BLD AUTO: 0.02 10*3/MM3 (ref 0–0.4)
EOSINOPHIL NFR BLD AUTO: 0.1 % (ref 0.3–6.2)
ERYTHROCYTE [DISTWIDTH] IN BLOOD BY AUTOMATED COUNT: 15.6 % (ref 12.3–15.4)
ETHANOL BLD-MCNC: 13 MG/DL (ref 0–10)
ETHANOL UR QL: 0.01 %
GAS FLOW AIRWAY: 2 LPM
GLOBULIN UR ELPH-MCNC: 2.6 GM/DL
GLUCOSE BLDC GLUCOMTR-MCNC: 197 MG/DL (ref 70–130)
GLUCOSE SERPL-MCNC: 217 MG/DL (ref 65–99)
HCO3 BLDA-SCNC: 24 MMOL/L (ref 22–28)
HCT VFR BLD AUTO: 41 % (ref 34–46.6)
HGB BLD-MCNC: 12.9 G/DL (ref 12–15.9)
IMM GRANULOCYTES # BLD AUTO: 0.09 10*3/MM3 (ref 0–0.05)
IMM GRANULOCYTES NFR BLD AUTO: 0.6 % (ref 0–0.5)
LYMPHOCYTES # BLD AUTO: 2.89 10*3/MM3 (ref 0.7–3.1)
LYMPHOCYTES NFR BLD AUTO: 18.9 % (ref 19.6–45.3)
MAGNESIUM SERPL-MCNC: 2 MG/DL (ref 1.6–2.6)
MCH RBC QN AUTO: 27.1 PG (ref 26.6–33)
MCHC RBC AUTO-ENTMCNC: 31.5 G/DL (ref 31.5–35.7)
MCV RBC AUTO: 86.1 FL (ref 79–97)
MODALITY: ABNORMAL
MONOCYTES # BLD AUTO: 0.99 10*3/MM3 (ref 0.1–0.9)
MONOCYTES NFR BLD AUTO: 6.5 % (ref 5–12)
NEUTROPHILS NFR BLD AUTO: 11.24 10*3/MM3 (ref 1.7–7)
NEUTROPHILS NFR BLD AUTO: 73.5 % (ref 42.7–76)
NRBC BLD AUTO-RTO: 0 /100 WBC (ref 0–0.2)
PCO2 BLDA: 40.7 MM HG (ref 35–45)
PH BLDA: 7.38 PH UNITS (ref 7.35–7.45)
PLATELET # BLD AUTO: 208 10*3/MM3 (ref 140–450)
PMV BLD AUTO: 11.2 FL (ref 6–12)
PO2 BLDA: 63.3 MM HG (ref 80–100)
POTASSIUM SERPL-SCNC: 4.4 MMOL/L (ref 3.5–5.2)
PROT SERPL-MCNC: 6.4 G/DL (ref 6–8.5)
RBC # BLD AUTO: 4.76 10*6/MM3 (ref 3.77–5.28)
SAO2 % BLDCOA: 91.4 % (ref 92–99)
SODIUM SERPL-SCNC: 135 MMOL/L (ref 136–145)
TOTAL RATE: 18 BREATHS/MINUTE
VENTILATOR MODE: ABNORMAL
WBC NRBC COR # BLD: 15.29 10*3/MM3 (ref 3.4–10.8)

## 2022-09-26 PROCEDURE — 82803 BLOOD GASES ANY COMBINATION: CPT

## 2022-09-26 PROCEDURE — P9612 CATHETERIZE FOR URINE SPEC: HCPCS

## 2022-09-26 PROCEDURE — 82077 ASSAY SPEC XCP UR&BREATH IA: CPT | Performed by: EMERGENCY MEDICINE

## 2022-09-26 PROCEDURE — 80053 COMPREHEN METABOLIC PANEL: CPT | Performed by: EMERGENCY MEDICINE

## 2022-09-26 PROCEDURE — 93010 ELECTROCARDIOGRAM REPORT: CPT | Performed by: INTERNAL MEDICINE

## 2022-09-26 PROCEDURE — 82962 GLUCOSE BLOOD TEST: CPT

## 2022-09-26 PROCEDURE — 83735 ASSAY OF MAGNESIUM: CPT | Performed by: EMERGENCY MEDICINE

## 2022-09-26 PROCEDURE — 93005 ELECTROCARDIOGRAM TRACING: CPT | Performed by: EMERGENCY MEDICINE

## 2022-09-26 PROCEDURE — 85025 COMPLETE CBC W/AUTO DIFF WBC: CPT | Performed by: EMERGENCY MEDICINE

## 2022-09-26 PROCEDURE — 36600 WITHDRAWAL OF ARTERIAL BLOOD: CPT

## 2022-09-26 PROCEDURE — 71045 X-RAY EXAM CHEST 1 VIEW: CPT

## 2022-09-26 PROCEDURE — 99285 EMERGENCY DEPT VISIT HI MDM: CPT

## 2022-09-27 ENCOUNTER — APPOINTMENT (OUTPATIENT)
Dept: CT IMAGING | Facility: HOSPITAL | Age: 57
End: 2022-09-27

## 2022-09-27 ENCOUNTER — READMISSION MANAGEMENT (OUTPATIENT)
Dept: CALL CENTER | Facility: HOSPITAL | Age: 57
End: 2022-09-27

## 2022-09-27 PROBLEM — A41.9 SEPSIS WITHOUT ACUTE ORGAN DYSFUNCTION (HCC): Status: ACTIVE | Noted: 2022-09-27

## 2022-09-27 LAB
ALBUMIN SERPL-MCNC: 3 G/DL (ref 3.5–5.2)
ANION GAP SERPL CALCULATED.3IONS-SCNC: 16.1 MMOL/L (ref 5–15)
ANION GAP SERPL CALCULATED.3IONS-SCNC: 17 MMOL/L (ref 5–15)
BACTERIA SPEC AEROBE CULT: ABNORMAL
BACTERIA UR QL AUTO: ABNORMAL /HPF
BILIRUB UR QL STRIP: NEGATIVE
BUN SERPL-MCNC: 23 MG/DL (ref 6–20)
BUN SERPL-MCNC: 43 MG/DL (ref 6–20)
BUN/CREAT SERPL: 7.2 (ref 7–25)
BUN/CREAT SERPL: 9.3 (ref 7–25)
CALCIUM SPEC-SCNC: 7.7 MG/DL (ref 8.6–10.5)
CALCIUM SPEC-SCNC: 8 MG/DL (ref 8.6–10.5)
CHLORIDE SERPL-SCNC: 91 MMOL/L (ref 98–107)
CHLORIDE SERPL-SCNC: 94 MMOL/L (ref 98–107)
CLARITY UR: ABNORMAL
CO2 SERPL-SCNC: 19 MMOL/L (ref 22–29)
CO2 SERPL-SCNC: 20.9 MMOL/L (ref 22–29)
COLOR UR: YELLOW
CREAT SERPL-MCNC: 3.2 MG/DL (ref 0.57–1)
CREAT SERPL-MCNC: 4.61 MG/DL (ref 0.57–1)
DEPRECATED RDW RBC AUTO: 47.8 FL (ref 37–54)
EGFRCR SERPLBLD CKD-EPI 2021: 10.6 ML/MIN/1.73
EGFRCR SERPLBLD CKD-EPI 2021: 16.4 ML/MIN/1.73
ERYTHROCYTE [DISTWIDTH] IN BLOOD BY AUTOMATED COUNT: 15.9 % (ref 12.3–15.4)
GLUCOSE BLDC GLUCOMTR-MCNC: 142 MG/DL (ref 70–130)
GLUCOSE BLDC GLUCOMTR-MCNC: 189 MG/DL (ref 70–130)
GLUCOSE BLDC GLUCOMTR-MCNC: 244 MG/DL (ref 70–130)
GLUCOSE SERPL-MCNC: 196 MG/DL (ref 65–99)
GLUCOSE SERPL-MCNC: 239 MG/DL (ref 65–99)
GLUCOSE UR STRIP-MCNC: ABNORMAL MG/DL
HBV SURFACE AG SERPL QL IA: NORMAL
HCT VFR BLD AUTO: 38.3 % (ref 34–46.6)
HGB BLD-MCNC: 12.4 G/DL (ref 12–15.9)
HGB UR QL STRIP.AUTO: ABNORMAL
HYALINE CASTS UR QL AUTO: ABNORMAL /LPF
KETONES UR QL STRIP: NEGATIVE
LEUKOCYTE ESTERASE UR QL STRIP.AUTO: ABNORMAL
MCH RBC QN AUTO: 27.1 PG (ref 26.6–33)
MCHC RBC AUTO-ENTMCNC: 32.4 G/DL (ref 31.5–35.7)
MCV RBC AUTO: 83.6 FL (ref 79–97)
NITRITE UR QL STRIP: NEGATIVE
PH UR STRIP.AUTO: <=5 [PH] (ref 5–8)
PHOSPHATE SERPL-MCNC: 7.1 MG/DL (ref 2.5–4.5)
PLATELET # BLD AUTO: 193 10*3/MM3 (ref 140–450)
PMV BLD AUTO: 10.2 FL (ref 6–12)
POTASSIUM SERPL-SCNC: 4.4 MMOL/L (ref 3.5–5.2)
POTASSIUM SERPL-SCNC: 5.3 MMOL/L (ref 3.5–5.2)
PROT UR QL STRIP: ABNORMAL
QT INTERVAL: 344 MS
RBC # BLD AUTO: 4.58 10*6/MM3 (ref 3.77–5.28)
RBC # UR STRIP: ABNORMAL /HPF
REF LAB TEST METHOD: ABNORMAL
SODIUM SERPL-SCNC: 127 MMOL/L (ref 136–145)
SODIUM SERPL-SCNC: 131 MMOL/L (ref 136–145)
SP GR UR STRIP: 1.02 (ref 1–1.03)
SQUAMOUS #/AREA URNS HPF: ABNORMAL /HPF
UROBILINOGEN UR QL STRIP: ABNORMAL
WBC # UR STRIP: ABNORMAL /HPF
WBC NRBC COR # BLD: 12.29 10*3/MM3 (ref 3.4–10.8)
YEAST URNS QL MICRO: ABNORMAL /HPF

## 2022-09-27 PROCEDURE — 93010 ELECTROCARDIOGRAM REPORT: CPT | Performed by: INTERNAL MEDICINE

## 2022-09-27 PROCEDURE — 93005 ELECTROCARDIOGRAM TRACING: CPT | Performed by: STUDENT IN AN ORGANIZED HEALTH CARE EDUCATION/TRAINING PROGRAM

## 2022-09-27 PROCEDURE — 36415 COLL VENOUS BLD VENIPUNCTURE: CPT | Performed by: NURSE PRACTITIONER

## 2022-09-27 PROCEDURE — 63710000001 INSULIN LISPRO (HUMAN) PER 5 UNITS: Performed by: NURSE PRACTITIONER

## 2022-09-27 PROCEDURE — 70450 CT HEAD/BRAIN W/O DYE: CPT

## 2022-09-27 PROCEDURE — 87086 URINE CULTURE/COLONY COUNT: CPT | Performed by: EMERGENCY MEDICINE

## 2022-09-27 PROCEDURE — 25010000002 CEFTRIAXONE PER 250 MG: Performed by: NURSE PRACTITIONER

## 2022-09-27 PROCEDURE — 5A1D70Z PERFORMANCE OF URINARY FILTRATION, INTERMITTENT, LESS THAN 6 HOURS PER DAY: ICD-10-PCS | Performed by: INTERNAL MEDICINE

## 2022-09-27 PROCEDURE — 87340 HEPATITIS B SURFACE AG IA: CPT | Performed by: STUDENT IN AN ORGANIZED HEALTH CARE EDUCATION/TRAINING PROGRAM

## 2022-09-27 PROCEDURE — 81001 URINALYSIS AUTO W/SCOPE: CPT | Performed by: EMERGENCY MEDICINE

## 2022-09-27 PROCEDURE — 87040 BLOOD CULTURE FOR BACTERIA: CPT | Performed by: STUDENT IN AN ORGANIZED HEALTH CARE EDUCATION/TRAINING PROGRAM

## 2022-09-27 PROCEDURE — 80048 BASIC METABOLIC PNL TOTAL CA: CPT | Performed by: STUDENT IN AN ORGANIZED HEALTH CARE EDUCATION/TRAINING PROGRAM

## 2022-09-27 PROCEDURE — 25010000002 HEPARIN (PORCINE) PER 1000 UNITS: Performed by: INTERNAL MEDICINE

## 2022-09-27 PROCEDURE — 82962 GLUCOSE BLOOD TEST: CPT

## 2022-09-27 PROCEDURE — 25010000002 ONDANSETRON PER 1 MG: Performed by: NURSE PRACTITIONER

## 2022-09-27 PROCEDURE — 80069 RENAL FUNCTION PANEL: CPT | Performed by: NURSE PRACTITIONER

## 2022-09-27 PROCEDURE — 74176 CT ABD & PELVIS W/O CONTRAST: CPT

## 2022-09-27 PROCEDURE — 85027 COMPLETE CBC AUTOMATED: CPT | Performed by: NURSE PRACTITIONER

## 2022-09-27 RX ORDER — NITROGLYCERIN 0.4 MG/1
0.4 TABLET SUBLINGUAL
Status: DISCONTINUED | OUTPATIENT
Start: 2022-09-27 | End: 2022-10-06 | Stop reason: HOSPADM

## 2022-09-27 RX ORDER — NICOTINE POLACRILEX 4 MG
15 LOZENGE BUCCAL
Status: DISCONTINUED | OUTPATIENT
Start: 2022-09-27 | End: 2022-10-06 | Stop reason: HOSPADM

## 2022-09-27 RX ORDER — ACETAMINOPHEN 325 MG/1
650 TABLET ORAL EVERY 4 HOURS PRN
Status: DISCONTINUED | OUTPATIENT
Start: 2022-09-27 | End: 2022-10-06 | Stop reason: HOSPADM

## 2022-09-27 RX ORDER — ONDANSETRON 4 MG/1
4 TABLET, FILM COATED ORAL EVERY 6 HOURS PRN
Status: DISCONTINUED | OUTPATIENT
Start: 2022-09-27 | End: 2022-10-06 | Stop reason: HOSPADM

## 2022-09-27 RX ORDER — SODIUM CHLORIDE 0.9 % (FLUSH) 0.9 %
10 SYRINGE (ML) INJECTION AS NEEDED
Status: DISCONTINUED | OUTPATIENT
Start: 2022-09-27 | End: 2022-10-06 | Stop reason: HOSPADM

## 2022-09-27 RX ORDER — PANTOPRAZOLE SODIUM 40 MG/1
40 TABLET, DELAYED RELEASE ORAL DAILY
Status: DISCONTINUED | OUTPATIENT
Start: 2022-09-27 | End: 2022-10-06 | Stop reason: HOSPADM

## 2022-09-27 RX ORDER — ALBUMIN (HUMAN) 12.5 G/50ML
12.5 SOLUTION INTRAVENOUS AS NEEDED
Status: ACTIVE | OUTPATIENT
Start: 2022-09-27 | End: 2022-09-28

## 2022-09-27 RX ORDER — GABAPENTIN 100 MG/1
100 CAPSULE ORAL
Status: DISCONTINUED | OUTPATIENT
Start: 2022-09-27 | End: 2022-10-04

## 2022-09-27 RX ORDER — ONDANSETRON 2 MG/ML
4 INJECTION INTRAMUSCULAR; INTRAVENOUS EVERY 6 HOURS PRN
Status: DISCONTINUED | OUTPATIENT
Start: 2022-09-27 | End: 2022-10-06 | Stop reason: HOSPADM

## 2022-09-27 RX ORDER — INSULIN LISPRO 100 [IU]/ML
0-9 INJECTION, SOLUTION INTRAVENOUS; SUBCUTANEOUS
Status: DISCONTINUED | OUTPATIENT
Start: 2022-09-27 | End: 2022-10-06 | Stop reason: HOSPADM

## 2022-09-27 RX ORDER — ASPIRIN 81 MG/1
81 TABLET ORAL DAILY
Status: DISCONTINUED | OUTPATIENT
Start: 2022-09-27 | End: 2022-10-06 | Stop reason: HOSPADM

## 2022-09-27 RX ORDER — PANTOPRAZOLE SODIUM 40 MG/10ML
40 INJECTION, POWDER, LYOPHILIZED, FOR SOLUTION INTRAVENOUS
Status: DISCONTINUED | OUTPATIENT
Start: 2022-09-27 | End: 2022-09-27 | Stop reason: SDUPTHER

## 2022-09-27 RX ORDER — DILTIAZEM HYDROCHLORIDE 120 MG/1
120 CAPSULE, COATED, EXTENDED RELEASE ORAL
Status: DISCONTINUED | OUTPATIENT
Start: 2022-09-27 | End: 2022-10-03

## 2022-09-27 RX ORDER — AMLODIPINE BESYLATE 10 MG/1
10 TABLET ORAL
Status: DISCONTINUED | OUTPATIENT
Start: 2022-09-27 | End: 2022-09-29

## 2022-09-27 RX ORDER — HEPARIN SODIUM 1000 [USP'U]/ML
4000 INJECTION, SOLUTION INTRAVENOUS; SUBCUTANEOUS ONCE
Status: COMPLETED | OUTPATIENT
Start: 2022-09-27 | End: 2022-09-27

## 2022-09-27 RX ORDER — LEVOTHYROXINE SODIUM 0.12 MG/1
250 TABLET ORAL
Status: DISCONTINUED | OUTPATIENT
Start: 2022-09-27 | End: 2022-10-06 | Stop reason: HOSPADM

## 2022-09-27 RX ORDER — HEPARIN SODIUM 5000 [USP'U]/ML
5000 INJECTION, SOLUTION INTRAVENOUS; SUBCUTANEOUS EVERY 8 HOURS SCHEDULED
Status: DISCONTINUED | OUTPATIENT
Start: 2022-09-27 | End: 2022-09-27

## 2022-09-27 RX ORDER — METOPROLOL TARTRATE 50 MG/1
100 TABLET, FILM COATED ORAL EVERY 12 HOURS SCHEDULED
Status: DISCONTINUED | OUTPATIENT
Start: 2022-09-27 | End: 2022-10-06

## 2022-09-27 RX ORDER — ACETAMINOPHEN 160 MG/5ML
650 SOLUTION ORAL EVERY 4 HOURS PRN
Status: DISCONTINUED | OUTPATIENT
Start: 2022-09-27 | End: 2022-10-06 | Stop reason: HOSPADM

## 2022-09-27 RX ORDER — DEXTROSE MONOHYDRATE 25 G/50ML
25 INJECTION, SOLUTION INTRAVENOUS
Status: DISCONTINUED | OUTPATIENT
Start: 2022-09-27 | End: 2022-10-06 | Stop reason: HOSPADM

## 2022-09-27 RX ORDER — FOLIC ACID 1 MG/1
1 TABLET ORAL DAILY
Status: DISCONTINUED | OUTPATIENT
Start: 2022-09-27 | End: 2022-10-06 | Stop reason: HOSPADM

## 2022-09-27 RX ORDER — OXYCODONE HYDROCHLORIDE 5 MG/1
5 TABLET ORAL EVERY 4 HOURS PRN
Status: DISCONTINUED | OUTPATIENT
Start: 2022-09-27 | End: 2022-10-02

## 2022-09-27 RX ORDER — ACETAMINOPHEN 650 MG/1
650 SUPPOSITORY RECTAL EVERY 4 HOURS PRN
Status: DISCONTINUED | OUTPATIENT
Start: 2022-09-27 | End: 2022-10-06 | Stop reason: HOSPADM

## 2022-09-27 RX ORDER — HYDROCORTISONE 25 MG/G
CREAM TOPICAL 2 TIMES DAILY
Status: DISCONTINUED | OUTPATIENT
Start: 2022-09-27 | End: 2022-10-06 | Stop reason: HOSPADM

## 2022-09-27 RX ORDER — ROPINIROLE 1 MG/1
1 TABLET, FILM COATED ORAL NIGHTLY
Status: DISCONTINUED | OUTPATIENT
Start: 2022-09-27 | End: 2022-10-06 | Stop reason: HOSPADM

## 2022-09-27 RX ORDER — CLONIDINE HYDROCHLORIDE 0.1 MG/1
0.1 TABLET ORAL EVERY 8 HOURS SCHEDULED
Status: DISCONTINUED | OUTPATIENT
Start: 2022-09-27 | End: 2022-09-29

## 2022-09-27 RX ORDER — POLYETHYLENE GLYCOL 3350 17 G/17G
17 POWDER, FOR SOLUTION ORAL DAILY PRN
Status: DISCONTINUED | OUTPATIENT
Start: 2022-09-27 | End: 2022-10-06 | Stop reason: HOSPADM

## 2022-09-27 RX ADMIN — LEVOTHYROXINE SODIUM 250 MCG: 0.12 TABLET ORAL at 15:56

## 2022-09-27 RX ADMIN — INSULIN LISPRO 2 UNITS: 100 INJECTION, SOLUTION INTRAVENOUS; SUBCUTANEOUS at 09:10

## 2022-09-27 RX ADMIN — CEFTRIAXONE SODIUM 1 G: 1 INJECTION, POWDER, FOR SOLUTION INTRAMUSCULAR; INTRAVENOUS at 05:16

## 2022-09-27 RX ADMIN — METOPROLOL TARTRATE 100 MG: 50 TABLET, FILM COATED ORAL at 20:32

## 2022-09-27 RX ADMIN — METOPROLOL TARTRATE 5 MG: 1 INJECTION, SOLUTION INTRAVENOUS at 01:44

## 2022-09-27 RX ADMIN — INSULIN LISPRO 4 UNITS: 100 INJECTION, SOLUTION INTRAVENOUS; SUBCUTANEOUS at 20:41

## 2022-09-27 RX ADMIN — HYDROCORTISONE: 25 CREAM TOPICAL at 18:10

## 2022-09-27 RX ADMIN — ASPIRIN 81 MG: 81 TABLET, COATED ORAL at 15:56

## 2022-09-27 RX ADMIN — ONDANSETRON 4 MG: 2 INJECTION INTRAMUSCULAR; INTRAVENOUS at 01:56

## 2022-09-27 RX ADMIN — GABAPENTIN 100 MG: 100 CAPSULE ORAL at 15:56

## 2022-09-27 RX ADMIN — HEPARIN SODIUM 4000 UNITS: 1000 INJECTION INTRAVENOUS; SUBCUTANEOUS at 14:34

## 2022-09-27 RX ADMIN — METOPROLOL TARTRATE 25 MG: 25 TABLET, FILM COATED ORAL at 01:42

## 2022-09-27 RX ADMIN — Medication 1 MG: at 15:55

## 2022-09-27 RX ADMIN — PANTOPRAZOLE SODIUM 40 MG: 40 TABLET, DELAYED RELEASE ORAL at 15:56

## 2022-09-27 RX ADMIN — DILTIAZEM HYDROCHLORIDE 120 MG: 120 CAPSULE, COATED, EXTENDED RELEASE ORAL at 15:55

## 2022-09-27 RX ADMIN — APIXABAN 5 MG: 5 TABLET, FILM COATED ORAL at 20:33

## 2022-09-27 RX ADMIN — CLONIDINE HYDROCHLORIDE 0.1 MG: 0.1 TABLET ORAL at 21:37

## 2022-09-27 RX ADMIN — AMLODIPINE BESYLATE 10 MG: 10 TABLET ORAL at 15:56

## 2022-09-27 RX ADMIN — PANTOPRAZOLE SODIUM 40 MG: 40 INJECTION, POWDER, FOR SOLUTION INTRAVENOUS at 06:17

## 2022-09-27 RX ADMIN — SERTRALINE 150 MG: 100 TABLET, FILM COATED ORAL at 15:55

## 2022-09-27 RX ADMIN — ROPINIROLE 1 MG: 1 TABLET, FILM COATED ORAL at 20:32

## 2022-09-27 RX ADMIN — Medication 10 ML: at 09:11

## 2022-09-27 RX ADMIN — METOPROLOL TARTRATE 100 MG: 50 TABLET, FILM COATED ORAL at 12:20

## 2022-09-27 RX ADMIN — CLONIDINE HYDROCHLORIDE 0.1 MG: 0.1 TABLET ORAL at 15:55

## 2022-09-27 NOTE — OUTREACH NOTE
CHF Week 2 Survey    Flowsheet Row Responses   [facility] patient discharged from? [place]   Does the patient have one of the following disease processes/diagnoses(primary or secondary)? CHF   Week 2 attempt successful? No   Unsuccessful attempts Attempt 1   Revoke Readmitted          ADONAY MAJOR - Registered Nurse

## 2022-09-28 LAB
ALBUMIN SERPL-MCNC: 2.9 G/DL (ref 3.5–5.2)
ANION GAP SERPL CALCULATED.3IONS-SCNC: 9.7 MMOL/L (ref 5–15)
BASOPHILS # BLD AUTO: 0.08 10*3/MM3 (ref 0–0.2)
BASOPHILS NFR BLD AUTO: 0.7 % (ref 0–1.5)
BUN SERPL-MCNC: 27 MG/DL (ref 6–20)
BUN/CREAT SERPL: 7.5 (ref 7–25)
CALCIUM SPEC-SCNC: 7.5 MG/DL (ref 8.6–10.5)
CHLORIDE SERPL-SCNC: 93 MMOL/L (ref 98–107)
CO2 SERPL-SCNC: 26.3 MMOL/L (ref 22–29)
CREAT SERPL-MCNC: 3.62 MG/DL (ref 0.57–1)
DEPRECATED RDW RBC AUTO: 48.6 FL (ref 37–54)
EGFRCR SERPLBLD CKD-EPI 2021: 14.2 ML/MIN/1.73
EOSINOPHIL # BLD AUTO: 0.08 10*3/MM3 (ref 0–0.4)
EOSINOPHIL NFR BLD AUTO: 0.7 % (ref 0.3–6.2)
ERYTHROCYTE [DISTWIDTH] IN BLOOD BY AUTOMATED COUNT: 15.7 % (ref 12.3–15.4)
GLUCOSE BLDC GLUCOMTR-MCNC: 183 MG/DL (ref 70–130)
GLUCOSE BLDC GLUCOMTR-MCNC: 226 MG/DL (ref 70–130)
GLUCOSE BLDC GLUCOMTR-MCNC: 229 MG/DL (ref 70–130)
GLUCOSE BLDC GLUCOMTR-MCNC: 351 MG/DL (ref 70–130)
GLUCOSE SERPL-MCNC: 251 MG/DL (ref 65–99)
HCT VFR BLD AUTO: 37.4 % (ref 34–46.6)
HGB BLD-MCNC: 11.7 G/DL (ref 12–15.9)
IMM GRANULOCYTES # BLD AUTO: 0.08 10*3/MM3 (ref 0–0.05)
IMM GRANULOCYTES NFR BLD AUTO: 0.7 % (ref 0–0.5)
LYMPHOCYTES # BLD AUTO: 1.77 10*3/MM3 (ref 0.7–3.1)
LYMPHOCYTES NFR BLD AUTO: 14.6 % (ref 19.6–45.3)
MAGNESIUM SERPL-MCNC: 1.8 MG/DL (ref 1.6–2.6)
MCH RBC QN AUTO: 26.7 PG (ref 26.6–33)
MCHC RBC AUTO-ENTMCNC: 31.3 G/DL (ref 31.5–35.7)
MCV RBC AUTO: 85.4 FL (ref 79–97)
MONOCYTES # BLD AUTO: 1.14 10*3/MM3 (ref 0.1–0.9)
MONOCYTES NFR BLD AUTO: 9.4 % (ref 5–12)
NEUTROPHILS NFR BLD AUTO: 73.9 % (ref 42.7–76)
NEUTROPHILS NFR BLD AUTO: 9 10*3/MM3 (ref 1.7–7)
NRBC BLD AUTO-RTO: 0 /100 WBC (ref 0–0.2)
PHOSPHATE SERPL-MCNC: 4.9 MG/DL (ref 2.5–4.5)
PLATELET # BLD AUTO: 212 10*3/MM3 (ref 140–450)
PMV BLD AUTO: 10.8 FL (ref 6–12)
POTASSIUM SERPL-SCNC: 4 MMOL/L (ref 3.5–5.2)
QT INTERVAL: 345 MS
RBC # BLD AUTO: 4.38 10*6/MM3 (ref 3.77–5.28)
SODIUM SERPL-SCNC: 129 MMOL/L (ref 136–145)
WBC NRBC COR # BLD: 12.15 10*3/MM3 (ref 3.4–10.8)

## 2022-09-28 PROCEDURE — 97166 OT EVAL MOD COMPLEX 45 MIN: CPT

## 2022-09-28 PROCEDURE — 97530 THERAPEUTIC ACTIVITIES: CPT

## 2022-09-28 PROCEDURE — 85025 COMPLETE CBC W/AUTO DIFF WBC: CPT | Performed by: STUDENT IN AN ORGANIZED HEALTH CARE EDUCATION/TRAINING PROGRAM

## 2022-09-28 PROCEDURE — 25010000002 HEPARIN (PORCINE) PER 1000 UNITS: Performed by: INTERNAL MEDICINE

## 2022-09-28 PROCEDURE — 97162 PT EVAL MOD COMPLEX 30 MIN: CPT

## 2022-09-28 PROCEDURE — 83735 ASSAY OF MAGNESIUM: CPT | Performed by: INTERNAL MEDICINE

## 2022-09-28 PROCEDURE — 82962 GLUCOSE BLOOD TEST: CPT

## 2022-09-28 PROCEDURE — 63710000001 INSULIN LISPRO (HUMAN) PER 5 UNITS: Performed by: NURSE PRACTITIONER

## 2022-09-28 PROCEDURE — 25010000002 EPOETIN ALFA-EPBX 10000 UNIT/ML SOLUTION: Performed by: STUDENT IN AN ORGANIZED HEALTH CARE EDUCATION/TRAINING PROGRAM

## 2022-09-28 PROCEDURE — 97116 GAIT TRAINING THERAPY: CPT

## 2022-09-28 PROCEDURE — 25010000002 CEFTRIAXONE PER 250 MG: Performed by: NURSE PRACTITIONER

## 2022-09-28 PROCEDURE — 99222 1ST HOSP IP/OBS MODERATE 55: CPT | Performed by: STUDENT IN AN ORGANIZED HEALTH CARE EDUCATION/TRAINING PROGRAM

## 2022-09-28 PROCEDURE — 80069 RENAL FUNCTION PANEL: CPT | Performed by: INTERNAL MEDICINE

## 2022-09-28 RX ORDER — ALBUMIN (HUMAN) 12.5 G/50ML
12.5 SOLUTION INTRAVENOUS AS NEEDED
Status: ACTIVE | OUTPATIENT
Start: 2022-09-28 | End: 2022-09-28

## 2022-09-28 RX ORDER — HEPARIN SODIUM 1000 [USP'U]/ML
3800 INJECTION, SOLUTION INTRAVENOUS; SUBCUTANEOUS AS NEEDED
Status: DISCONTINUED | OUTPATIENT
Start: 2022-09-28 | End: 2022-10-06 | Stop reason: HOSPADM

## 2022-09-28 RX ADMIN — EPOETIN ALFA-EPBX 10000 UNITS: 10000 INJECTION, SOLUTION INTRAVENOUS; SUBCUTANEOUS at 12:40

## 2022-09-28 RX ADMIN — SERTRALINE 150 MG: 100 TABLET, FILM COATED ORAL at 18:16

## 2022-09-28 RX ADMIN — METOPROLOL TARTRATE 100 MG: 50 TABLET, FILM COATED ORAL at 21:23

## 2022-09-28 RX ADMIN — HYDROCORTISONE: 25 CREAM TOPICAL at 10:23

## 2022-09-28 RX ADMIN — ASPIRIN 81 MG: 81 TABLET, COATED ORAL at 18:18

## 2022-09-28 RX ADMIN — INSULIN LISPRO 8 UNITS: 100 INJECTION, SOLUTION INTRAVENOUS; SUBCUTANEOUS at 12:39

## 2022-09-28 RX ADMIN — INSULIN LISPRO 2 UNITS: 100 INJECTION, SOLUTION INTRAVENOUS; SUBCUTANEOUS at 21:23

## 2022-09-28 RX ADMIN — CLONIDINE HYDROCHLORIDE 0.1 MG: 0.1 TABLET ORAL at 05:58

## 2022-09-28 RX ADMIN — APIXABAN 5 MG: 5 TABLET, FILM COATED ORAL at 21:23

## 2022-09-28 RX ADMIN — HYDROCORTISONE: 25 CREAM TOPICAL at 21:23

## 2022-09-28 RX ADMIN — HEPARIN SODIUM 3800 UNITS: 1000 INJECTION INTRAVENOUS; SUBCUTANEOUS at 16:49

## 2022-09-28 RX ADMIN — Medication 1 MG: at 18:17

## 2022-09-28 RX ADMIN — CEFTRIAXONE SODIUM 1 G: 1 INJECTION, POWDER, FOR SOLUTION INTRAMUSCULAR; INTRAVENOUS at 05:21

## 2022-09-28 RX ADMIN — CLONIDINE HYDROCHLORIDE 0.1 MG: 0.1 TABLET ORAL at 21:23

## 2022-09-28 RX ADMIN — LEVOTHYROXINE SODIUM 250 MCG: 0.12 TABLET ORAL at 05:58

## 2022-09-28 RX ADMIN — INSULIN LISPRO 4 UNITS: 100 INJECTION, SOLUTION INTRAVENOUS; SUBCUTANEOUS at 08:59

## 2022-09-28 RX ADMIN — OXYCODONE 5 MG: 5 TABLET ORAL at 21:23

## 2022-09-28 RX ADMIN — INSULIN LISPRO 4 UNITS: 100 INJECTION, SOLUTION INTRAVENOUS; SUBCUTANEOUS at 18:18

## 2022-09-28 RX ADMIN — AMLODIPINE BESYLATE 10 MG: 10 TABLET ORAL at 18:17

## 2022-09-28 RX ADMIN — GABAPENTIN 100 MG: 100 CAPSULE ORAL at 12:45

## 2022-09-28 RX ADMIN — Medication 10 ML: at 09:03

## 2022-09-28 RX ADMIN — ROPINIROLE 1 MG: 1 TABLET, FILM COATED ORAL at 21:23

## 2022-09-28 RX ADMIN — PANTOPRAZOLE SODIUM 40 MG: 40 TABLET, DELAYED RELEASE ORAL at 18:17

## 2022-09-28 RX ADMIN — DILTIAZEM HYDROCHLORIDE 120 MG: 120 CAPSULE, COATED, EXTENDED RELEASE ORAL at 18:17

## 2022-09-29 ENCOUNTER — APPOINTMENT (OUTPATIENT)
Dept: GENERAL RADIOLOGY | Facility: HOSPITAL | Age: 57
End: 2022-09-29

## 2022-09-29 ENCOUNTER — ANESTHESIA (OUTPATIENT)
Dept: PERIOP | Facility: HOSPITAL | Age: 57
End: 2022-09-29

## 2022-09-29 ENCOUNTER — ANESTHESIA EVENT (OUTPATIENT)
Dept: PERIOP | Facility: HOSPITAL | Age: 57
End: 2022-09-29

## 2022-09-29 LAB
ALBUMIN SERPL-MCNC: 3 G/DL (ref 3.5–5.2)
ANION GAP SERPL CALCULATED.3IONS-SCNC: 16.1 MMOL/L (ref 5–15)
BASOPHILS # BLD AUTO: 0.06 10*3/MM3 (ref 0–0.2)
BASOPHILS NFR BLD AUTO: 0.5 % (ref 0–1.5)
BUN SERPL-MCNC: 41 MG/DL (ref 6–20)
BUN/CREAT SERPL: 9.2 (ref 7–25)
CALCIUM SPEC-SCNC: 7.6 MG/DL (ref 8.6–10.5)
CHLORIDE SERPL-SCNC: 94 MMOL/L (ref 98–107)
CO2 SERPL-SCNC: 20.9 MMOL/L (ref 22–29)
CREAT SERPL-MCNC: 4.44 MG/DL (ref 0.57–1)
DEPRECATED RDW RBC AUTO: 50.6 FL (ref 37–54)
EGFRCR SERPLBLD CKD-EPI 2021: 11.1 ML/MIN/1.73
EOSINOPHIL # BLD AUTO: 0.08 10*3/MM3 (ref 0–0.4)
EOSINOPHIL NFR BLD AUTO: 0.7 % (ref 0.3–6.2)
ERYTHROCYTE [DISTWIDTH] IN BLOOD BY AUTOMATED COUNT: 15.8 % (ref 12.3–15.4)
GLUCOSE BLDC GLUCOMTR-MCNC: 100 MG/DL (ref 70–130)
GLUCOSE BLDC GLUCOMTR-MCNC: 169 MG/DL (ref 70–130)
GLUCOSE BLDC GLUCOMTR-MCNC: 243 MG/DL (ref 70–130)
GLUCOSE BLDC GLUCOMTR-MCNC: 404 MG/DL (ref 70–130)
GLUCOSE BLDC GLUCOMTR-MCNC: 85 MG/DL (ref 70–130)
GLUCOSE SERPL-MCNC: 250 MG/DL (ref 65–99)
HCT VFR BLD AUTO: 38.3 % (ref 34–46.6)
HGB BLD-MCNC: 11.7 G/DL (ref 12–15.9)
IMM GRANULOCYTES # BLD AUTO: 0.05 10*3/MM3 (ref 0–0.05)
IMM GRANULOCYTES NFR BLD AUTO: 0.4 % (ref 0–0.5)
LYMPHOCYTES # BLD AUTO: 2.04 10*3/MM3 (ref 0.7–3.1)
LYMPHOCYTES NFR BLD AUTO: 18 % (ref 19.6–45.3)
MAGNESIUM SERPL-MCNC: 1.9 MG/DL (ref 1.6–2.6)
MCH RBC QN AUTO: 26.7 PG (ref 26.6–33)
MCHC RBC AUTO-ENTMCNC: 30.5 G/DL (ref 31.5–35.7)
MCV RBC AUTO: 87.4 FL (ref 79–97)
MONOCYTES # BLD AUTO: 0.87 10*3/MM3 (ref 0.1–0.9)
MONOCYTES NFR BLD AUTO: 7.7 % (ref 5–12)
NEUTROPHILS NFR BLD AUTO: 72.7 % (ref 42.7–76)
NEUTROPHILS NFR BLD AUTO: 8.21 10*3/MM3 (ref 1.7–7)
NRBC BLD AUTO-RTO: 0 /100 WBC (ref 0–0.2)
PHOSPHATE SERPL-MCNC: 5.4 MG/DL (ref 2.5–4.5)
PLATELET # BLD AUTO: 173 10*3/MM3 (ref 140–450)
PMV BLD AUTO: 10.9 FL (ref 6–12)
POTASSIUM SERPL-SCNC: 4.9 MMOL/L (ref 3.5–5.2)
RBC # BLD AUTO: 4.38 10*6/MM3 (ref 3.77–5.28)
SODIUM SERPL-SCNC: 131 MMOL/L (ref 136–145)
WBC NRBC COR # BLD: 11.31 10*3/MM3 (ref 3.4–10.8)

## 2022-09-29 PROCEDURE — 25010000002 PROPOFOL 200 MG/20ML EMULSION: Performed by: ANESTHESIOLOGY

## 2022-09-29 PROCEDURE — 99232 SBSQ HOSP IP/OBS MODERATE 35: CPT | Performed by: STUDENT IN AN ORGANIZED HEALTH CARE EDUCATION/TRAINING PROGRAM

## 2022-09-29 PROCEDURE — 25010000002 CEFTRIAXONE PER 250 MG: Performed by: NURSE PRACTITIONER

## 2022-09-29 PROCEDURE — 25010000002 MIDAZOLAM PER 1 MG: Performed by: ANESTHESIOLOGY

## 2022-09-29 PROCEDURE — 83735 ASSAY OF MAGNESIUM: CPT | Performed by: INTERNAL MEDICINE

## 2022-09-29 PROCEDURE — C2617 STENT, NON-COR, TEM W/O DEL: HCPCS | Performed by: UROLOGY

## 2022-09-29 PROCEDURE — 0T778DZ DILATION OF LEFT URETER WITH INTRALUMINAL DEVICE, VIA NATURAL OR ARTIFICIAL OPENING ENDOSCOPIC: ICD-10-PCS | Performed by: UROLOGY

## 2022-09-29 PROCEDURE — BT1F1ZZ FLUOROSCOPY OF LEFT KIDNEY, URETER AND BLADDER USING LOW OSMOLAR CONTRAST: ICD-10-PCS | Performed by: UROLOGY

## 2022-09-29 PROCEDURE — 82962 GLUCOSE BLOOD TEST: CPT

## 2022-09-29 PROCEDURE — C1758 CATHETER, URETERAL: HCPCS | Performed by: UROLOGY

## 2022-09-29 PROCEDURE — 63710000001 INSULIN LISPRO (HUMAN) PER 5 UNITS: Performed by: NURSE PRACTITIONER

## 2022-09-29 PROCEDURE — 74420 UROGRAPHY RTRGR +-KUB: CPT

## 2022-09-29 PROCEDURE — 0 IOTHALAMATE 60 % SOLUTION: Performed by: UROLOGY

## 2022-09-29 PROCEDURE — 25010000002 ONDANSETRON PER 1 MG: Performed by: NURSE PRACTITIONER

## 2022-09-29 PROCEDURE — 97530 THERAPEUTIC ACTIVITIES: CPT

## 2022-09-29 PROCEDURE — 85025 COMPLETE CBC W/AUTO DIFF WBC: CPT | Performed by: STUDENT IN AN ORGANIZED HEALTH CARE EDUCATION/TRAINING PROGRAM

## 2022-09-29 PROCEDURE — 80069 RENAL FUNCTION PANEL: CPT | Performed by: INTERNAL MEDICINE

## 2022-09-29 PROCEDURE — C1769 GUIDE WIRE: HCPCS | Performed by: UROLOGY

## 2022-09-29 DEVICE — URETERAL STENT
Type: IMPLANTABLE DEVICE | Site: URETER | Status: FUNCTIONAL
Brand: CONTOUR™

## 2022-09-29 RX ORDER — MEPERIDINE HYDROCHLORIDE 25 MG/ML
12.5 INJECTION INTRAMUSCULAR; INTRAVENOUS; SUBCUTANEOUS ONCE
Status: DISCONTINUED | OUTPATIENT
Start: 2022-09-29 | End: 2022-09-29 | Stop reason: HOSPADM

## 2022-09-29 RX ORDER — FLUCONAZOLE 200 MG/1
400 TABLET ORAL ONCE
Status: COMPLETED | OUTPATIENT
Start: 2022-09-29 | End: 2022-09-29

## 2022-09-29 RX ORDER — AMLODIPINE BESYLATE 2.5 MG/1
2.5 TABLET ORAL
Status: DISCONTINUED | OUTPATIENT
Start: 2022-09-29 | End: 2022-10-06

## 2022-09-29 RX ORDER — MIDAZOLAM HYDROCHLORIDE 1 MG/ML
1 INJECTION INTRAMUSCULAR; INTRAVENOUS
Status: DISCONTINUED | OUTPATIENT
Start: 2022-09-29 | End: 2022-09-29 | Stop reason: HOSPADM

## 2022-09-29 RX ORDER — SODIUM CHLORIDE 0.9 % (FLUSH) 0.9 %
3-10 SYRINGE (ML) INJECTION AS NEEDED
Status: DISCONTINUED | OUTPATIENT
Start: 2022-09-29 | End: 2022-09-29 | Stop reason: HOSPADM

## 2022-09-29 RX ORDER — ONDANSETRON 2 MG/ML
4 INJECTION INTRAMUSCULAR; INTRAVENOUS ONCE AS NEEDED
Status: DISCONTINUED | OUTPATIENT
Start: 2022-09-29 | End: 2022-09-29 | Stop reason: HOSPADM

## 2022-09-29 RX ORDER — SODIUM CHLORIDE 9 MG/ML
INJECTION, SOLUTION INTRAVENOUS CONTINUOUS PRN
Status: DISCONTINUED | OUTPATIENT
Start: 2022-09-29 | End: 2022-09-29 | Stop reason: SURG

## 2022-09-29 RX ORDER — MAGNESIUM HYDROXIDE 1200 MG/15ML
LIQUID ORAL AS NEEDED
Status: DISCONTINUED | OUTPATIENT
Start: 2022-09-29 | End: 2022-09-29 | Stop reason: HOSPADM

## 2022-09-29 RX ORDER — FLUCONAZOLE 200 MG/1
400 TABLET ORAL DAILY
Status: DISCONTINUED | OUTPATIENT
Start: 2022-09-30 | End: 2022-09-30

## 2022-09-29 RX ORDER — FENTANYL CITRATE 50 UG/ML
50 INJECTION, SOLUTION INTRAMUSCULAR; INTRAVENOUS
Status: DISCONTINUED | OUTPATIENT
Start: 2022-09-29 | End: 2022-09-29 | Stop reason: HOSPADM

## 2022-09-29 RX ORDER — SODIUM CHLORIDE, SODIUM LACTATE, POTASSIUM CHLORIDE, CALCIUM CHLORIDE 600; 310; 30; 20 MG/100ML; MG/100ML; MG/100ML; MG/100ML
100 INJECTION, SOLUTION INTRAVENOUS CONTINUOUS
Status: DISCONTINUED | OUTPATIENT
Start: 2022-09-29 | End: 2022-09-30

## 2022-09-29 RX ORDER — EPHEDRINE SULFATE 50 MG/ML
5 INJECTION, SOLUTION INTRAVENOUS ONCE AS NEEDED
Status: DISCONTINUED | OUTPATIENT
Start: 2022-09-29 | End: 2022-09-29 | Stop reason: HOSPADM

## 2022-09-29 RX ORDER — NALOXONE HCL 0.4 MG/ML
0.4 VIAL (ML) INJECTION AS NEEDED
Status: DISCONTINUED | OUTPATIENT
Start: 2022-09-29 | End: 2022-09-29 | Stop reason: HOSPADM

## 2022-09-29 RX ORDER — SODIUM CHLORIDE, SODIUM LACTATE, POTASSIUM CHLORIDE, CALCIUM CHLORIDE 600; 310; 30; 20 MG/100ML; MG/100ML; MG/100ML; MG/100ML
9 INJECTION, SOLUTION INTRAVENOUS CONTINUOUS
Status: DISCONTINUED | OUTPATIENT
Start: 2022-09-29 | End: 2022-10-01

## 2022-09-29 RX ORDER — EPHEDRINE SULFATE 50 MG/ML
INJECTION INTRAVENOUS AS NEEDED
Status: DISCONTINUED | OUTPATIENT
Start: 2022-09-29 | End: 2022-09-29 | Stop reason: SURG

## 2022-09-29 RX ORDER — SODIUM CHLORIDE 9 MG/ML
INJECTION, SOLUTION INTRAVENOUS CONTINUOUS PRN
Status: DISCONTINUED | OUTPATIENT
Start: 2022-09-29 | End: 2022-09-29

## 2022-09-29 RX ORDER — FAMOTIDINE 10 MG/ML
20 INJECTION, SOLUTION INTRAVENOUS ONCE
Status: COMPLETED | OUTPATIENT
Start: 2022-09-29 | End: 2022-09-29

## 2022-09-29 RX ORDER — PROPOFOL 10 MG/ML
INJECTION, EMULSION INTRAVENOUS AS NEEDED
Status: DISCONTINUED | OUTPATIENT
Start: 2022-09-29 | End: 2022-09-29 | Stop reason: SURG

## 2022-09-29 RX ORDER — LIDOCAINE HYDROCHLORIDE 10 MG/ML
0.5 INJECTION, SOLUTION EPIDURAL; INFILTRATION; INTRACAUDAL; PERINEURAL ONCE AS NEEDED
Status: DISCONTINUED | OUTPATIENT
Start: 2022-09-29 | End: 2022-09-29 | Stop reason: HOSPADM

## 2022-09-29 RX ORDER — SODIUM CHLORIDE 0.9 % (FLUSH) 0.9 %
3 SYRINGE (ML) INJECTION EVERY 12 HOURS SCHEDULED
Status: DISCONTINUED | OUTPATIENT
Start: 2022-09-29 | End: 2022-09-29 | Stop reason: HOSPADM

## 2022-09-29 RX ORDER — FENTANYL CITRATE 50 UG/ML
12.5 INJECTION, SOLUTION INTRAMUSCULAR; INTRAVENOUS
Status: DISCONTINUED | OUTPATIENT
Start: 2022-09-29 | End: 2022-09-29 | Stop reason: HOSPADM

## 2022-09-29 RX ADMIN — DILTIAZEM HYDROCHLORIDE 120 MG: 120 CAPSULE, COATED, EXTENDED RELEASE ORAL at 09:08

## 2022-09-29 RX ADMIN — ACETAMINOPHEN 650 MG: 325 TABLET, FILM COATED ORAL at 19:39

## 2022-09-29 RX ADMIN — ASPIRIN 81 MG: 81 TABLET, COATED ORAL at 09:08

## 2022-09-29 RX ADMIN — FAMOTIDINE 20 MG: 10 INJECTION INTRAVENOUS at 16:09

## 2022-09-29 RX ADMIN — SERTRALINE 150 MG: 100 TABLET, FILM COATED ORAL at 09:08

## 2022-09-29 RX ADMIN — Medication 10 ML: at 21:18

## 2022-09-29 RX ADMIN — PROPOFOL 40 MG: 10 INJECTION, EMULSION INTRAVENOUS at 16:58

## 2022-09-29 RX ADMIN — OXYCODONE 5 MG: 5 TABLET ORAL at 12:36

## 2022-09-29 RX ADMIN — APIXABAN 5 MG: 5 TABLET, FILM COATED ORAL at 23:52

## 2022-09-29 RX ADMIN — INSULIN LISPRO 4 UNITS: 100 INJECTION, SOLUTION INTRAVENOUS; SUBCUTANEOUS at 09:09

## 2022-09-29 RX ADMIN — FLUCONAZOLE 400 MG: 200 TABLET ORAL at 14:41

## 2022-09-29 RX ADMIN — EPHEDRINE SULFATE 10 MG: 50 INJECTION INTRAVENOUS at 17:11

## 2022-09-29 RX ADMIN — Medication 1 MG: at 09:08

## 2022-09-29 RX ADMIN — OXYCODONE 5 MG: 5 TABLET ORAL at 05:50

## 2022-09-29 RX ADMIN — INSULIN LISPRO 2 UNITS: 100 INJECTION, SOLUTION INTRAVENOUS; SUBCUTANEOUS at 12:37

## 2022-09-29 RX ADMIN — MIDAZOLAM 1 MG: 1 INJECTION, SOLUTION INTRAMUSCULAR; INTRAVENOUS at 16:15

## 2022-09-29 RX ADMIN — HYDROCORTISONE: 25 CREAM TOPICAL at 21:19

## 2022-09-29 RX ADMIN — CLONIDINE HYDROCHLORIDE 0.1 MG: 0.1 TABLET ORAL at 05:50

## 2022-09-29 RX ADMIN — LEVOTHYROXINE SODIUM 250 MCG: 0.12 TABLET ORAL at 05:50

## 2022-09-29 RX ADMIN — SODIUM CHLORIDE: 9 INJECTION, SOLUTION INTRAVENOUS at 16:45

## 2022-09-29 RX ADMIN — GABAPENTIN 100 MG: 100 CAPSULE ORAL at 12:37

## 2022-09-29 RX ADMIN — HYDROCORTISONE: 25 CREAM TOPICAL at 09:09

## 2022-09-29 RX ADMIN — ROPINIROLE 1 MG: 1 TABLET, FILM COATED ORAL at 23:52

## 2022-09-29 RX ADMIN — EPHEDRINE SULFATE 10 MG: 50 INJECTION INTRAVENOUS at 17:19

## 2022-09-29 RX ADMIN — CEFTRIAXONE SODIUM 1 G: 1 INJECTION, POWDER, FOR SOLUTION INTRAMUSCULAR; INTRAVENOUS at 05:49

## 2022-09-29 RX ADMIN — ONDANSETRON 4 MG: 2 INJECTION INTRAMUSCULAR; INTRAVENOUS at 09:58

## 2022-09-29 RX ADMIN — AMLODIPINE BESYLATE 2.5 MG: 10 TABLET ORAL at 09:08

## 2022-09-29 RX ADMIN — APIXABAN 5 MG: 5 TABLET, FILM COATED ORAL at 09:08

## 2022-09-29 RX ADMIN — METOPROLOL TARTRATE 100 MG: 50 TABLET, FILM COATED ORAL at 09:08

## 2022-09-29 RX ADMIN — PANTOPRAZOLE SODIUM 40 MG: 40 TABLET, DELAYED RELEASE ORAL at 09:08

## 2022-09-29 NOTE — ANESTHESIA POSTPROCEDURE EVALUATION
"Patient: Zaina Martinez    Procedure Summary     Date: 09/29/22 Room / Location: [facility] OR  / [facility] MAIN OR    Anesthesia Start: 1647 Anesthesia Stop: 1741    Procedure: CYSTOSCOPY URETERAL STENT INSERTION WITH RETROGRADE PYELOGRAM (Left ) Diagnosis:     Surgeons: Bryant Phan Jr., MD Provider: Eloisa Ordoñez MD    Anesthesia Type: general ASA Status: 4          Anesthesia Type: general    Vitals  Vitals Value Taken Time   /81 09/29/22 1821   Temp     Pulse 57 09/29/22 1841   Resp     SpO2 100 % 09/29/22 1841   Vitals shown include unvalidated device data.        Post Anesthesia Care and Evaluation    Patient location during evaluation: PACU  Patient participation: complete - patient participated  Level of consciousness: awake and alert  Pain management: adequate    Airway patency: patent  Anesthetic complications: No anesthetic complications    Cardiovascular status: acceptable  Respiratory status: acceptable  Hydration status: acceptable    Comments: /81   Pulse 62   Temp 36.4 °C (97.5 °F) (Oral)   Resp 12   Ht 157.5 cm (62.01\")   Wt 63 kg (138 lb 14.2 oz)   SpO2 99%   BMI 25.40 kg/m²       "

## 2022-09-29 NOTE — ANESTHESIA PREPROCEDURE EVALUATION
Anesthesia Evaluation     Patient summary reviewed and Nursing notes reviewed                Airway   Mallampati: II  TM distance: >3 FB  Neck ROM: full  No difficulty expected  Dental      Pulmonary    (+) pneumonia , pleural effusion, shortness of breath, recent URI,   Cardiovascular     ECG reviewed  PT is on anticoagulation therapy  Patient on routine beta blocker and Beta blocker given within 24 hours of surgery  Rhythm: irregular    (+) hypertension, valvular problems/murmurs, past MI , CAD, dysrhythmias Atrial Fib, Atrial Flutter, Paroxysmal Atrial Fib, CHF , pericardial effusion, hyperlipidemia,       Neuro/Psych  (+) CVA, psychiatric history Anxiety and Depression,    GI/Hepatic/Renal/Endo    (+)  GERD,  renal disease ESRD and dialysis, diabetes mellitus type 2 poorly controlled using insulin, thyroid problem hypothyroidism    Musculoskeletal     Abdominal    Substance History - negative use     OB/GYN negative ob/gyn ROS         Other   arthritis,                      Anesthesia Plan    ASA 4     general     (I have reviewed the patient's history with the patient and the chart, including all pertinent laboratory results and imaging. I have explained the risks of anesthesia including but not limited to dental damage, corneal abrasion, nerve injury, MI, stroke, and death. Questions asked and answered. Anesthetic plan discussed with patient and team as indicated. Patient expressed understanding of the above.  )  intravenous induction     Anesthetic plan, risks, benefits, and alternatives have been provided, discussed and informed consent has been obtained with: patient.        CODE STATUS:    Code Status (Patient has no pulse and is not breathing): CPR (Attempt to Resuscitate)  Medical Interventions (Patient has pulse or is breathing): Full Support

## 2022-09-29 NOTE — ANESTHESIA PROCEDURE NOTES
Airway  Urgency: elective    Date/Time: 9/29/2022 5:00 PM    General Information and Staff    Patient location during procedure: OR  Anesthesiologist: Eloisa Ordoñez MD    Indications and Patient Condition  Indications for airway management: airway protection    Preoxygenated: yes  Mask difficulty assessment: 1 - vent by mask    Final Airway Details  Final airway type: supraglottic airway      Successful airway: classic  Size 3    Number of attempts at approach: 1  Assessment: lips, teeth, and gum same as pre-op and atraumatic intubation

## 2022-09-30 LAB
ALBUMIN SERPL-MCNC: 3 G/DL (ref 3.5–5.2)
ANION GAP SERPL CALCULATED.3IONS-SCNC: 16 MMOL/L (ref 5–15)
BASOPHILS # BLD AUTO: 0.08 10*3/MM3 (ref 0–0.2)
BASOPHILS NFR BLD AUTO: 0.8 % (ref 0–1.5)
BUN SERPL-MCNC: 47 MG/DL (ref 6–20)
BUN/CREAT SERPL: 10 (ref 7–25)
CALCIUM SPEC-SCNC: 7.8 MG/DL (ref 8.6–10.5)
CHLORIDE SERPL-SCNC: 94 MMOL/L (ref 98–107)
CO2 SERPL-SCNC: 20 MMOL/L (ref 22–29)
CREAT SERPL-MCNC: 4.7 MG/DL (ref 0.57–1)
DEPRECATED RDW RBC AUTO: 48.8 FL (ref 37–54)
EGFRCR SERPLBLD CKD-EPI 2021: 10.3 ML/MIN/1.73
EOSINOPHIL # BLD AUTO: 0.04 10*3/MM3 (ref 0–0.4)
EOSINOPHIL NFR BLD AUTO: 0.4 % (ref 0.3–6.2)
ERYTHROCYTE [DISTWIDTH] IN BLOOD BY AUTOMATED COUNT: 15.7 % (ref 12.3–15.4)
GLUCOSE BLDC GLUCOMTR-MCNC: 166 MG/DL (ref 70–130)
GLUCOSE BLDC GLUCOMTR-MCNC: 228 MG/DL (ref 70–130)
GLUCOSE BLDC GLUCOMTR-MCNC: 262 MG/DL (ref 70–130)
GLUCOSE BLDC GLUCOMTR-MCNC: 285 MG/DL (ref 70–130)
GLUCOSE SERPL-MCNC: 243 MG/DL (ref 65–99)
HCT VFR BLD AUTO: 37.1 % (ref 34–46.6)
HGB BLD-MCNC: 11.7 G/DL (ref 12–15.9)
IMM GRANULOCYTES # BLD AUTO: 0.05 10*3/MM3 (ref 0–0.05)
IMM GRANULOCYTES NFR BLD AUTO: 0.5 % (ref 0–0.5)
LYMPHOCYTES # BLD AUTO: 1.32 10*3/MM3 (ref 0.7–3.1)
LYMPHOCYTES NFR BLD AUTO: 12.7 % (ref 19.6–45.3)
MCH RBC QN AUTO: 27.4 PG (ref 26.6–33)
MCHC RBC AUTO-ENTMCNC: 31.5 G/DL (ref 31.5–35.7)
MCV RBC AUTO: 86.9 FL (ref 79–97)
MONOCYTES # BLD AUTO: 0.74 10*3/MM3 (ref 0.1–0.9)
MONOCYTES NFR BLD AUTO: 7.1 % (ref 5–12)
NEUTROPHILS NFR BLD AUTO: 78.5 % (ref 42.7–76)
NEUTROPHILS NFR BLD AUTO: 8.18 10*3/MM3 (ref 1.7–7)
NRBC BLD AUTO-RTO: 0 /100 WBC (ref 0–0.2)
PHOSPHATE SERPL-MCNC: 6.5 MG/DL (ref 2.5–4.5)
PLATELET # BLD AUTO: 195 10*3/MM3 (ref 140–450)
PMV BLD AUTO: 12 FL (ref 6–12)
POTASSIUM SERPL-SCNC: 5.2 MMOL/L (ref 3.5–5.2)
RBC # BLD AUTO: 4.27 10*6/MM3 (ref 3.77–5.28)
SODIUM SERPL-SCNC: 130 MMOL/L (ref 136–145)
WBC NRBC COR # BLD: 10.41 10*3/MM3 (ref 3.4–10.8)

## 2022-09-30 PROCEDURE — 82962 GLUCOSE BLOOD TEST: CPT

## 2022-09-30 PROCEDURE — 25010000002 HEPARIN (PORCINE) PER 1000 UNITS: Performed by: UROLOGY

## 2022-09-30 PROCEDURE — 80069 RENAL FUNCTION PANEL: CPT | Performed by: UROLOGY

## 2022-09-30 PROCEDURE — 63710000001 INSULIN LISPRO (HUMAN) PER 5 UNITS: Performed by: UROLOGY

## 2022-09-30 PROCEDURE — 25010000002 CEFTRIAXONE PER 250 MG: Performed by: UROLOGY

## 2022-09-30 PROCEDURE — 85025 COMPLETE CBC W/AUTO DIFF WBC: CPT | Performed by: UROLOGY

## 2022-09-30 RX ORDER — FLUCONAZOLE 200 MG/1
400 TABLET ORAL
Status: COMPLETED | OUTPATIENT
Start: 2022-10-03 | End: 2022-10-03

## 2022-09-30 RX ORDER — FLUCONAZOLE 200 MG/1
200 TABLET ORAL
Status: COMPLETED | OUTPATIENT
Start: 2022-10-01 | End: 2022-10-04

## 2022-09-30 RX ADMIN — APIXABAN 5 MG: 5 TABLET, FILM COATED ORAL at 20:01

## 2022-09-30 RX ADMIN — HYDROCORTISONE: 25 CREAM TOPICAL at 20:02

## 2022-09-30 RX ADMIN — PANTOPRAZOLE SODIUM 40 MG: 40 TABLET, DELAYED RELEASE ORAL at 12:32

## 2022-09-30 RX ADMIN — METOPROLOL TARTRATE 100 MG: 50 TABLET, FILM COATED ORAL at 20:02

## 2022-09-30 RX ADMIN — ASPIRIN 81 MG: 81 TABLET, COATED ORAL at 12:33

## 2022-09-30 RX ADMIN — AMLODIPINE BESYLATE 2.5 MG: 10 TABLET ORAL at 12:33

## 2022-09-30 RX ADMIN — INSULIN LISPRO 6 UNITS: 100 INJECTION, SOLUTION INTRAVENOUS; SUBCUTANEOUS at 18:13

## 2022-09-30 RX ADMIN — HEPARIN SODIUM 3800 UNITS: 1000 INJECTION INTRAVENOUS; SUBCUTANEOUS at 10:21

## 2022-09-30 RX ADMIN — GABAPENTIN 100 MG: 100 CAPSULE ORAL at 12:32

## 2022-09-30 RX ADMIN — SERTRALINE 150 MG: 100 TABLET, FILM COATED ORAL at 12:33

## 2022-09-30 RX ADMIN — Medication 1 MG: at 12:33

## 2022-09-30 RX ADMIN — APIXABAN 5 MG: 5 TABLET, FILM COATED ORAL at 12:33

## 2022-09-30 RX ADMIN — LEVOTHYROXINE SODIUM 250 MCG: 0.12 TABLET ORAL at 05:19

## 2022-09-30 RX ADMIN — CEFTRIAXONE SODIUM 1 G: 1 INJECTION, POWDER, FOR SOLUTION INTRAMUSCULAR; INTRAVENOUS at 05:19

## 2022-09-30 RX ADMIN — INSULIN LISPRO 6 UNITS: 100 INJECTION, SOLUTION INTRAVENOUS; SUBCUTANEOUS at 21:35

## 2022-09-30 RX ADMIN — OXYCODONE 5 MG: 5 TABLET ORAL at 23:19

## 2022-09-30 RX ADMIN — ROPINIROLE 1 MG: 1 TABLET, FILM COATED ORAL at 20:01

## 2022-09-30 RX ADMIN — SODIUM CHLORIDE, POTASSIUM CHLORIDE, SODIUM LACTATE AND CALCIUM CHLORIDE 100 ML/HR: 600; 310; 30; 20 INJECTION, SOLUTION INTRAVENOUS at 07:19

## 2022-09-30 RX ADMIN — DILTIAZEM HYDROCHLORIDE 120 MG: 120 CAPSULE, COATED, EXTENDED RELEASE ORAL at 12:33

## 2022-09-30 RX ADMIN — FLUCONAZOLE 400 MG: 200 TABLET ORAL at 12:33

## 2022-09-30 RX ADMIN — INSULIN LISPRO 2 UNITS: 100 INJECTION, SOLUTION INTRAVENOUS; SUBCUTANEOUS at 12:50

## 2022-10-01 LAB
ALBUMIN SERPL-MCNC: 3.3 G/DL (ref 3.5–5.2)
ANION GAP SERPL CALCULATED.3IONS-SCNC: 13.4 MMOL/L (ref 5–15)
BASOPHILS # BLD AUTO: 0.06 10*3/MM3 (ref 0–0.2)
BASOPHILS NFR BLD AUTO: 0.6 % (ref 0–1.5)
BUN SERPL-MCNC: 28 MG/DL (ref 6–20)
BUN/CREAT SERPL: 8 (ref 7–25)
CALCIUM SPEC-SCNC: 8.2 MG/DL (ref 8.6–10.5)
CHLORIDE SERPL-SCNC: 93 MMOL/L (ref 98–107)
CO2 SERPL-SCNC: 23.6 MMOL/L (ref 22–29)
CREAT SERPL-MCNC: 3.51 MG/DL (ref 0.57–1)
DEPRECATED RDW RBC AUTO: 52.1 FL (ref 37–54)
EGFRCR SERPLBLD CKD-EPI 2021: 14.7 ML/MIN/1.73
EOSINOPHIL # BLD AUTO: 0.04 10*3/MM3 (ref 0–0.4)
EOSINOPHIL NFR BLD AUTO: 0.4 % (ref 0.3–6.2)
ERYTHROCYTE [DISTWIDTH] IN BLOOD BY AUTOMATED COUNT: 16 % (ref 12.3–15.4)
GLUCOSE BLDC GLUCOMTR-MCNC: 234 MG/DL (ref 70–130)
GLUCOSE BLDC GLUCOMTR-MCNC: 259 MG/DL (ref 70–130)
GLUCOSE BLDC GLUCOMTR-MCNC: 356 MG/DL (ref 70–130)
GLUCOSE SERPL-MCNC: 250 MG/DL (ref 65–99)
HCT VFR BLD AUTO: 42.5 % (ref 34–46.6)
HGB BLD-MCNC: 12.6 G/DL (ref 12–15.9)
IMM GRANULOCYTES # BLD AUTO: 0.05 10*3/MM3 (ref 0–0.05)
IMM GRANULOCYTES NFR BLD AUTO: 0.5 % (ref 0–0.5)
LYMPHOCYTES # BLD AUTO: 1.48 10*3/MM3 (ref 0.7–3.1)
LYMPHOCYTES NFR BLD AUTO: 15.6 % (ref 19.6–45.3)
MCH RBC QN AUTO: 26.4 PG (ref 26.6–33)
MCHC RBC AUTO-ENTMCNC: 29.6 G/DL (ref 31.5–35.7)
MCV RBC AUTO: 89.1 FL (ref 79–97)
MONOCYTES # BLD AUTO: 0.86 10*3/MM3 (ref 0.1–0.9)
MONOCYTES NFR BLD AUTO: 9 % (ref 5–12)
NEUTROPHILS NFR BLD AUTO: 7.02 10*3/MM3 (ref 1.7–7)
NEUTROPHILS NFR BLD AUTO: 73.9 % (ref 42.7–76)
NRBC BLD AUTO-RTO: 0.1 /100 WBC (ref 0–0.2)
PHOSPHATE SERPL-MCNC: 4.8 MG/DL (ref 2.5–4.5)
PLATELET # BLD AUTO: 172 10*3/MM3 (ref 140–450)
PMV BLD AUTO: 10.9 FL (ref 6–12)
POTASSIUM SERPL-SCNC: 4.6 MMOL/L (ref 3.5–5.2)
RBC # BLD AUTO: 4.77 10*6/MM3 (ref 3.77–5.28)
SODIUM SERPL-SCNC: 130 MMOL/L (ref 136–145)
WBC NRBC COR # BLD: 9.51 10*3/MM3 (ref 3.4–10.8)

## 2022-10-01 PROCEDURE — 80069 RENAL FUNCTION PANEL: CPT | Performed by: INTERNAL MEDICINE

## 2022-10-01 PROCEDURE — 82962 GLUCOSE BLOOD TEST: CPT

## 2022-10-01 PROCEDURE — 25010000002 CEFTRIAXONE PER 250 MG: Performed by: UROLOGY

## 2022-10-01 PROCEDURE — 85025 COMPLETE CBC W/AUTO DIFF WBC: CPT | Performed by: UROLOGY

## 2022-10-01 PROCEDURE — 63710000001 INSULIN LISPRO (HUMAN) PER 5 UNITS: Performed by: UROLOGY

## 2022-10-01 RX ADMIN — CEFTRIAXONE SODIUM 1 G: 1 INJECTION, POWDER, FOR SOLUTION INTRAMUSCULAR; INTRAVENOUS at 08:46

## 2022-10-01 RX ADMIN — OXYCODONE 5 MG: 5 TABLET ORAL at 22:08

## 2022-10-01 RX ADMIN — GABAPENTIN 100 MG: 100 CAPSULE ORAL at 12:11

## 2022-10-01 RX ADMIN — HYDROCORTISONE: 25 CREAM TOPICAL at 08:51

## 2022-10-01 RX ADMIN — ASPIRIN 81 MG: 81 TABLET, COATED ORAL at 08:45

## 2022-10-01 RX ADMIN — SERTRALINE 150 MG: 100 TABLET, FILM COATED ORAL at 08:45

## 2022-10-01 RX ADMIN — INSULIN GLARGINE-YFGN 5 UNITS: 100 INJECTION, SOLUTION SUBCUTANEOUS at 21:45

## 2022-10-01 RX ADMIN — APIXABAN 5 MG: 5 TABLET, FILM COATED ORAL at 08:45

## 2022-10-01 RX ADMIN — INSULIN LISPRO 6 UNITS: 100 INJECTION, SOLUTION INTRAVENOUS; SUBCUTANEOUS at 21:45

## 2022-10-01 RX ADMIN — APIXABAN 5 MG: 5 TABLET, FILM COATED ORAL at 21:45

## 2022-10-01 RX ADMIN — FLUCONAZOLE 200 MG: 200 TABLET ORAL at 08:45

## 2022-10-01 RX ADMIN — Medication 10 ML: at 22:03

## 2022-10-01 RX ADMIN — Medication 1 MG: at 08:45

## 2022-10-01 RX ADMIN — LEVOTHYROXINE SODIUM 250 MCG: 0.12 TABLET ORAL at 05:42

## 2022-10-01 RX ADMIN — INSULIN LISPRO 4 UNITS: 100 INJECTION, SOLUTION INTRAVENOUS; SUBCUTANEOUS at 08:47

## 2022-10-01 RX ADMIN — METOPROLOL TARTRATE 100 MG: 50 TABLET, FILM COATED ORAL at 21:45

## 2022-10-01 RX ADMIN — HYDROCORTISONE: 25 CREAM TOPICAL at 21:45

## 2022-10-01 RX ADMIN — AMLODIPINE BESYLATE 2.5 MG: 10 TABLET ORAL at 19:04

## 2022-10-01 RX ADMIN — ROPINIROLE 1 MG: 1 TABLET, FILM COATED ORAL at 21:45

## 2022-10-01 RX ADMIN — INSULIN LISPRO 8 UNITS: 100 INJECTION, SOLUTION INTRAVENOUS; SUBCUTANEOUS at 12:11

## 2022-10-01 RX ADMIN — PANTOPRAZOLE SODIUM 40 MG: 40 TABLET, DELAYED RELEASE ORAL at 08:45

## 2022-10-01 RX ADMIN — DILTIAZEM HYDROCHLORIDE 120 MG: 120 CAPSULE, COATED, EXTENDED RELEASE ORAL at 19:05

## 2022-10-01 NOTE — PROGRESS NOTES
Name: Zaina Martinez ADMIT: 2022   : 1965  PCP: Jane Kyle NP    MRN: [id number] LOS: 4 days   AGE/SEX: 56 y.o. female  ROOM: [facility]     Subjective   Subjective   Patient seen and examined this morning. Hospital day 5.  POD #2 (10/01/2022) s/p cystoscopy with left ureteral stent insertion, no acute events overnight, no acute distress. Patient is awake, alert, oriented x3, resting comfortably. On 3L O2.  Planning to get HD today.      Review of Systems   Constitutional: Negative for chills and fever.   Respiratory: Positive for shortness of breath. Negative for wheezing.    Cardiovascular: Negative for chest pain and palpitations.   Gastrointestinal: Negative for abdominal pain, constipation and diarrhea.   Genitourinary: Negative for dysuria, frequency and hematuria.   Neurological: Negative for dizziness and light-headedness.        Objective   Objective   Vital Signs  Temp:  [98 °F (36.7 °C)-98.2 °F (36.8 °C)] 98.2 °F (36.8 °C)  Heart Rate:  [] 79  Resp:  [16] 16  BP: (138-157)/() 138/82  SpO2:  [93 %-95 %] 95 %  on  Flow (L/min):  [3] 3;   Device (Oxygen Therapy): room air  Body mass index is 22.27 kg/m².  Physical Exam  Vitals and nursing note reviewed.   Constitutional:       Appearance: She is ill-appearing.   HENT:      Head: Atraumatic.      Right Ear: External ear normal.      Left Ear: External ear normal.   Eyes:      General: No scleral icterus.     Pupils: Pupils are equal, round, and reactive to light.   Cardiovascular:      Rate and Rhythm: Normal rate and regular rhythm.      Pulses: Normal pulses.      Heart sounds: Normal heart sounds.   Pulmonary:      Effort: Pulmonary effort is normal. No accessory muscle usage or respiratory distress.      Breath sounds: Decreased breath sounds present.      Comments: On 3L O2  Abdominal:      General: Bowel sounds are normal.      Palpations: Abdomen is soft.      Tenderness: There is no abdominal tenderness.   Skin:      General: Skin is warm and dry.   Neurological:      Mental Status: She is alert and oriented to person, place, and time.       Results Review     I reviewed the patient's new clinical results. WBC within normal limits, sodium low at 130, stable and unchanged from prior.  Otherwise, BMP stable and relatively unchanged.  Results from last 7 days   Lab Units 10/01/22  0643 09/30/22  0613 09/29/22  0611 09/28/22  0536   WBC 10*3/mm3 9.51 10.41 11.31* 12.15*   HEMOGLOBIN g/dL 12.6 11.7* 11.7* 11.7*   PLATELETS 10*3/mm3 172 195 173 212     Results from last 7 days   Lab Units 10/01/22  0643 09/30/22  0613 09/29/22  0611 09/28/22  0536   SODIUM mmol/L 130* 130* 131* 129*   POTASSIUM mmol/L 4.6 5.2 4.9 4.0   CHLORIDE mmol/L 93* 94* 94* 93*   CO2 mmol/L 23.6 20.0* 20.9* 26.3   BUN mg/dL 28* 47* 41* 27*   CREATININE mg/dL 3.51* 4.70* 4.44* 3.62*   GLUCOSE mg/dL 250* 243* 250* 251*   EGFR mL/min/1.73 14.7* 10.3* 11.1* 14.2*     Results from last 7 days   Lab Units 10/01/22  0643 09/30/22  0613 09/29/22  0611 09/28/22  0536 09/27/22  0551 09/26/22  2318   ALBUMIN g/dL 3.30* 3.00* 3.00* 2.90*   < > 3.80   BILIRUBIN mg/dL  --   --   --   --   --  0.6   ALK PHOS U/L  --   --   --   --   --  213*   AST (SGOT) U/L  --   --   --   --   --  25   ALT (SGPT) U/L  --   --   --   --   --  19    < > = values in this interval not displayed.     Results from last 7 days   Lab Units 10/01/22  0643 09/30/22  0613 09/29/22  0611 09/28/22  0536 09/27/22  0551 09/26/22  2318   CALCIUM mg/dL 8.2* 7.8* 7.6* 7.5*   < > 8.6   ALBUMIN g/dL 3.30* 3.00* 3.00* 2.90*   < > 3.80   MAGNESIUM mg/dL  --   --  1.9 1.8  --  2.0   PHOSPHORUS mg/dL 4.8* 6.5* 5.4* 4.9*   < >  --     < > = values in this interval not displayed.       Glucose   Date/Time Value Ref Range Status   10/01/2022 0606 234 (H) 70 - 130 mg/dL Final     Comment:     Meter: [id number] : [id number] Musa SAAVEDRA   09/30/2022 2014 262 (H) 70 - 130 mg/dL Final     Comment:     Meter:  [id number] : [id number] Musa Edmonds NA   09/30/2022 1602 285 (H) 70 - 130 mg/dL Final     Comment:     Meter: [id number] : [id number] Chon VALDEZA   09/30/2022 1244 166 (H) 70 - 130 mg/dL Final     Comment:     Meter: [id number] : [id number] Liz Dai RN   09/30/2022 0635 228 (H) 70 - 130 mg/dL Final     Comment:     Meter: [id number] : [id number] Nicho FALCON NA   09/29/2022 1826 85 70 - 130 mg/dL Final     Comment:     Meter: [id number] : [id number] Samantha Raygoza RN   09/29/2022 1530 100 70 - 130 mg/dL Final     Comment:     Meter: [id number] : [id number] Alley Becerril RN       FL Retrograde Pyelogram In OR    Result Date: 9/29/2022  Operative imaging provided for Dr. Phan during cystogram with left retrograde pyelogram and stent deployment.  This report was finalized on 9/29/2022 7:46 PM by Dr. Adonis Burch M.D.      Telemetry personally reviewed by me this morning: Atrial flutter, rate controlled    Scheduled Medications  amLODIPine, 2.5 mg, Oral, Q24H  apixaban, 5 mg, Oral, Q12H  aspirin, 81 mg, Oral, Daily  cefTRIAXone, 1 g, Intravenous, Q24H  dilTIAZem CD, 120 mg, Oral, Q24H  epoetin brooke-epbx, 10,000 Units, Subcutaneous, Once per day on Mon Wed Fri  fluconazole, 200 mg, Oral, Once per day on Sun Tue Sat   And  [START ON 10/3/2022] fluconazole, 400 mg, Oral, Once per day on Mon  folic acid, 1 mg, Oral, Daily  gabapentin, 100 mg, Oral, Daily With Lunch  Hydrocortisone (Perianal), , Rectal, BID  insulin lispro, 0-9 Units, Subcutaneous, 4x Daily With Meals & Nightly  levothyroxine, 250 mcg, Oral, Q AM  metoprolol tartrate, 100 mg, Oral, Q12H  pantoprazole, 40 mg, Oral, Daily  rOPINIRole, 1 mg, Oral, Nightly  sertraline, 150 mg, Oral, Daily    Infusions  lactated ringers, 9 mL/hr    Diet  Diet Regular; Cardiac, Daily Fluid Restriction; Other; 1,200       Assessment/Plan     Active Hospital Problems    Diagnosis  POA   • **Acute UTI (urinary tract  infection) [N39.0]  Yes   • Sepsis without acute organ dysfunction (HCC) [A41.9]  Yes   • Chronic systolic CHF (congestive heart failure) ([facility]) [I50.22]  Yes   • Atrial flutter ([facility]) [I48.92]  Yes   • History of stroke- R MCA s/p TPA with subsequent ICH with debility [Z86.73]  Not Applicable   • Acute metabolic encephalopathy [G93.41]  Yes   • Anemia due to chronic kidney disease, on chronic dialysis ([facility]) [N18.6, D63.1, Z99.2]  Not Applicable   • ESRD (end stage renal disease) ([facility]) [N18.6]  Yes   • CAD (coronary artery disease) [I25.10]  Yes   • Hypothyroidism [E03.9]  Yes   • Rheumatoid arthritis ([facility]) [M06.9]  Yes   • Hyperlipidemia [E78.5]  Yes   • Type 2 diabetes mellitus with kidney complication, with long-term current use of insulin ([facility]) [E11.29, Z79.4]  Not Applicable      Resolved Hospital Problems   No resolved problems to display.       56 y.o. female with history of HFrEF, ESRD on HD M/W/F, type 2 diabetes mellitus, CAD, atrial flutter, hypertension, hyperlipidemia, rheumatoid arthritis, hypothyroidism and anemia of end-stage renal disease who presents to [facility] with reported confusion, nausea and vomiting that began yesterday prior to arrival.  Subsequently admitted for acute on chronic HFrEF and sepsis secondary to acute urinary tract infection.    Sepsis secondary to acute urinary tract infection: urine culture growing yeast-given that she had urinary tract manipulation would continue a course of fluconazole and Ceftriaxone. Blood cultures NGTD. Continue empiric antibiotics for suspected acute urinary tract infection.  Will follow-up results of infectious work-up to guide ongoing management decisions.    Abnormal findings on CT Abdomen/Pelvis: Urology consulted, patient subsequently underwent cystoscopy with left ureteral stent insertion on 09/29.  Follow-up urology plans and recommendations.    Acute hypoxic respiratory failure secondary to Acute on chronic systolic heart  failure: Currently on 3L O2 via [place], not on home oxygen. Appears stable at this time, no evidence of worsening respiratory status. HD for volume removal. Nephrology and Cardiology consulted and following. Will continue to follow their plans/recommendations. Greatly appreciate their help. Wean O2 as tolerated, continue pulse oximetry, telemetry, strict I/O, daily weights, low sodium diet.    Type 2 diabetes mellitus with insulin dependence with hyperglycemia: Currently on correctional SSI.  Has required 18 units SSI over the past 24 hours, per chart review.  Will start Lantus 5 units nightly, continue SSI.  Will monitor 24 hour insulin requirements and adjust as needed to achieve target inpatient range of 140-180. Continue Gabapentin for neuropathy.    Atrial flutter with RVR: Cardiology following.  Rate appears controlled on current regimen.  Continue Diltiazem and Metoprolol as currently prescribed plus home Eliquis.  Further management per cardiology. Will continue to follow Cardiology plans/recommendations. Greatly appreciate their help.    End-stage renal disease: On HD M/W/F as outpatient.  Nephrology consulted and following.  Defer management to them.    Hyponatremia: Na 130. Nephrology consulted for management. Will continue to follow their plans/recommendations. Greatly appreciate their help.    Hypertension: BP acceptable acutely. Continue current regimen as prescribed. Closely monitor to guide further management.    Anemia of end-stage renal disease: Hemoglobin stable. No indications to warrant acute intervention. Continue EPO as prescribed. Trend hemoglobin on CBC. No indications for acute intervention at this time.  Continue to monitor, transfuse for hemoglobin <7.    Hypothyroidism: Stable.  Continue home levothyroxine as prescribed.    · Eliquis (home med) for DVT prophylaxis.  · Full code.  · Discussed with patient and nursing staff.  · Anticipate discharge home timing yet to be determined.        DO Kathi  [facility]  10/01/22  09:43 EDT

## 2022-10-01 NOTE — PLAN OF CARE
Goal Outcome Evaluation:         Pt A&Ox4, 3L n/c though pt will remove from her nares at times, Aflutter/Afib controlled rate; brief for incontinence, Roxicodone given once this shift and pt reported improvement in her back and urethral pain.  Small amount of bloody drainage seen in brief; pt on Eliquis.  No IV access at this time as pt's L a/c IV infiltrated/was painful and was removed.  IV therapy came to attempt to obtain new site but was unsuccessful so have not been able to give IV Rocephin at this time - IV therapist advised call at 0700 or later and the next person may have success with the U/S machine as pt has a lot of scar tissue and veins that blow easily.        Problem: Adult Inpatient Plan of Care  Goal: Plan of Care Review  Outcome: Ongoing, Progressing  Goal: Optimal Comfort and Wellbeing  Outcome: Ongoing, Progressing  Intervention: Monitor Pain and Promote Comfort  Description: Assess pain level, treatment efficacy and patient response at regular intervals using a consistent pain scale.  Consider the presence and impact of preexisting chronic pain.  Encourage patient and caregiver involvement in pain assessment, interventions and safety measures.  Recent Flowsheet Documentation  Taken 9/30/2022 2319 by Caroline Dotson, RN  Pain Management Interventions:   care clustered   diversional activity provided   pillow support provided   position adjusted   see MAR   quiet environment facilitated  Intervention: Provide Person-Centered Care  Description: Use a family-focused approach to care.  Develop trust and rapport by proactively providing information, encouraging questions, addressing concerns and offering reassurance.  Acknowledge emotional response to hospitalization.  Recognize and utilize personal coping strategies.  Honor spiritual and cultural preferences.  Recent Flowsheet Documentation  Taken 9/30/2022 2319 by Caroline Dotson, RN  Trust Relationship/Rapport:   care explained   choices  provided   questions encouraged   questions answered   empathic listening provided   reassurance provided   thoughts/feelings acknowledged  Taken 9/30/2022 2004 by Caroline Dotson, RN  Trust Relationship/Rapport:   care explained   choices provided   thoughts/feelings acknowledged   questions answered   empathic listening provided   questions encouraged   reassurance provided  Goal: Readiness for Transition of Care  Outcome: Ongoing, Progressing     Problem: Fall Injury Risk  Goal: Absence of Fall and Fall-Related Injury  Outcome: Ongoing, Progressing  Intervention: Identify and Manage Contributors  Description: Develop a fall prevention plan with the patient and caregiver/family.  Provide reorientation, appropriate sensory stimulation and routines with changes in mental status to decrease risk of fall.  Promote use of personal vision and auditory aids.  Assess assistance level required for safe and effective self-care; provide support as needed, such as toileting, mobilization. For age 65 and older, implement timed toileting with assistance.  Encourage physical activity, such as performance of mobility and self-care at highest level of patient ability, multicomponent exercise program and provision of appropriate assistive devices.  If fall occurs, assess the severity of injury; implement fall injury protocol. Determine the cause and revise fall injury prevention plan.  Regularly review medication contribution to fall risk; adjust medication administration times to minimize risk of falling.  Consider risk related to polypharmacy and age.  Balance adequate pain management with potential for oversedation.  Recent Flowsheet Documentation  Taken 10/1/2022 0544 by Carolnie Dotsno, RN  Medication Review/Management:   medications reviewed   high-risk medications identified  Taken 10/1/2022 0351 by Caroline Dotson, RN  Medication Review/Management:   medications reviewed   high-risk medications identified  Taken  10/1/2022 0207 by Caroline Dotson RN  Medication Review/Management:   medications reviewed   high-risk medications identified  Taken 9/30/2022 2319 by Caroline Dotson RN  Medication Review/Management:   medications reviewed   high-risk medications identified  Self-Care Promotion:   independence encouraged   BADL personal objects within reach   safe use of adaptive equipment encouraged  Taken 9/30/2022 2147 by Caroline Dotson RN  Medication Review/Management:   medications reviewed   high-risk medications identified  Taken 9/30/2022 2004 by Caroline Dotson RN  Medication Review/Management:   medications reviewed   high-risk medications identified  Self-Care Promotion:   independence encouraged   BADL personal objects within reach   safe use of adaptive equipment encouraged  Intervention: Promote Injury-Free Environment  Description: Provide a safe, barrier-free environment that encourages independent activity.  Keep care area uncluttered and well-lighted.  Determine need for increased observation or monitoring.  Avoid use of devices that minimize mobility, such as restraints or indwelling urinary catheter.  Recent Flowsheet Documentation  Taken 10/1/2022 0544 by Caroline Dotson RN  Safety Promotion/Fall Prevention: safety round/check completed  Taken 10/1/2022 0351 by Caroline Dotson RN  Safety Promotion/Fall Prevention: safety round/check completed  Taken 10/1/2022 0207 by Caroline Dotson RN  Safety Promotion/Fall Prevention: safety round/check completed  Taken 9/30/2022 2319 by Caroline Dotson RN  Safety Promotion/Fall Prevention: safety round/check completed  Taken 9/30/2022 2147 by Caroline Dotson RN  Safety Promotion/Fall Prevention: safety round/check completed  Taken 9/30/2022 2004 by Caroline Dotson RN  Safety Promotion/Fall Prevention: safety round/check completed     Problem: UTI (Urinary Tract Infection)  Goal: Improved Infection Symptoms  Outcome: Ongoing, Progressing  Intervention:  Facilitate Optimal Urinary Elimination  Description: Monitor bowel and voiding pattern; promote good elimination habits (e.g., quick response to urge, frequent and complete emptying of bladder with voiding).  Provide incontinence products, such as moisture wicking incontinence pads and incontinence underwear.  Assess bladder volume with ultrasound prior to intermittent catheterization or to determine residual volume after catheterization or void; avoid overdistension of bladder.  Seek alternative to indwelling catheter, such as intermittent catheterization.  If indwelling catheter required, insert the smallest diameter catheter possible using aseptic technique; utilize a securement device.  Maintain closed collection system and unobstructed urine flow; position bag below bladder level and avoid tubing kinks.  Empty collection bag regularly, such as when 2/3 full and before transport.  Review necessity for indwelling catheter daily; advocate for prompt removal.  Recent Flowsheet Documentation  Taken 9/30/2022 2319 by Caroline Dotson, RN  Urinary Elimination Promotion: absorbent pad/diaper use encouraged  Taken 9/30/2022 2004 by Caroline Dotson, RN  Urinary Elimination Promotion: absorbent pad/diaper use encouraged  Intervention: Prevent Infection Progression  Description: Obtain cultures prior to initiating antimicrobial therapy, when possible. Do not delay for laboratory results in the presence of high suspicion or clinical indicators.  Administer ordered antimicrobial therapy promptly; reassess need regularly.  Monitor laboratory value, diagnostic test and clinical status trends, including urine characteristic for signs of infection progression.  Provide pharmacologic comfort measures, such as an analgesic, antispasmodic or antiemetic agent.  Ensure adequate fluid intake to increase or maintain urine production.  Promote consistent skin care, daily hygiene and perineal cleansing; avoid harsh soaps, encourage use  of emollients and change incontinence garments promptly.  Identify early signs of sepsis, such as increased heart rate and decreased blood pressure, as well as changes in mental state, respiratory pattern or peripheral perfusion.  Prepare for rapid sepsis management, including lactate level, intravenous access, fluid administration and oxygen therapy.  Anticipate and prepare for advanced diagnostic procedures based on risk and presentation (e.g., chest x-ray, renal sonography, abdominal or kidney radiographs).  Recent Flowsheet Documentation  Taken 9/30/2022 2319 by Caroline Dotson, RN  Infection Management: aseptic technique maintained  Taken 9/30/2022 2004 by Caroline Dotson, RN  Infection Management: aseptic technique maintained     Problem: Infection (Hemodialysis)  Goal: Absence of Infection Signs and Symptoms  Outcome: Ongoing, Progressing

## 2022-10-01 NOTE — DISCHARGE INSTRUCTIONS
[facility]     Home Care after: Cystoscopy / Stent placement    Follow the guidelines below. Call your doctor if you notice any unusual symptoms. Remember: You are under the influence of medication. Do not drive, drink alcoholic beverages, sign legal documents or make major decisions during the next 24 hours.    Wound Care  You will not have a dressing. There are no incisions.  You may have some red-tinged urine.  This will fluctuate and go away.  Typically, a stent is placed in the ureter. This is temporary and will be removed at your next office visit, depending on your clinical course.  It is normal to have some bladder spasms, frequency of urination, and urgency to urinate.  It is okay to use tylenol, ibuprofen, pyridium and/or oxybutynin for her bladder spasms.    Activity  Plan at least a few days off work, more if necessary, especially if your job requires heavy lifting or a lot of physical activities.  If you wish to return to work sooner, that is okay, but light-duty is recommended.  Sexual activity may resume in a few days  No jogging, tennis, heavy weight-lifting or prolonged physical activity for 2 weeks.  No driving while taking narcotic pain medication  You can shower 1 days after your surgery, but DO NOT SOAK in tub baths.  Avoid straining with bowel movements. Narcotics can cause constipation so you can take over-the-counter stool softeners (Senokot-S, Miralax, Colace) per the instructions on the box; hold these pills if you are experiencing diarrhea.       Return to Work/School  You may return to work/school in a few days.  If you need a note, please obtain from Dr. Rod's office during your follow-up visit    Bathing  No submersion under water! No tub bath, hot tub, pool, lake, pond, creek, stream, river, etc.  You can shower immediately after your procedure.      Diet  Gradually resume regular diet, as tolerated.   Drinking plenty of water is very important.    Driving (if applicable)  No  driving for 24 hours after surgery due to the anesthetic.  No driving anytime you are on pain medication.    Educational  The medication used to put you to sleep will be acting in your body for the next 24 hours, so you might feel a little sleepy. This feeling will slowly wear off. For the first 24 hours, you should NOT:   Drive a car, operate machinery, or power tools.  Drink any alcoholic drinks (even beer).   Make any important decisions, sign any important or legal papers.  For your safety, we strongly suggest that a responsible adult stay with you for the rest of the day and during the night after you leave the hospital.  Medication  You may take your usual medications unless told otherwise by your doctor.  Do not take any anticoagulation (Ibuprofen, Advil, Aspirin, Eliquis, Xarelto, Coumadin, etc) until cleared by your Doctor.  Narcotic pain medications can cause constipation. You may take an over-the-counter stool softener or laxative if needed.   A bowel movement every day is preferred, every other day is reasonable.      Call your Doctor if:  Call to notify your surgeon if you develop:  Fever greater than 101F  Unmanageable pain despite pain medication  Pain medication not effective or makes you ill  Blood staining continues to worsen for more than 24 hours  Difficulty breathing or unusual shortness of breath, excessive bleeding, drainage at the operative site, fevers, chills, increased pain that is not relieved by pain medications, persistent nausea or vomiting

## 2022-10-01 NOTE — PROGRESS NOTES
[facility] Progress Note      Patient Name: Zaina Martinez  : 1965  MRN: [id number]  Primary Care Physician:  Jane Kyle NP  Date of admission: 2022    Subjective     Interval History:     Patient seen and examined this morning.  Resting comfortably no chest pain, shortness of breath, nausea or vomiting    Review of Systems:   As noted above    Objective     Vitals:   Temp:  [98 °F (36.7 °C)-98.2 °F (36.8 °C)] 98.2 °F (36.8 °C)  Heart Rate:  [] 79  Resp:  [16] 16  BP: (138-157)/() 138/82  Flow (L/min):  [3] 3    Intake/Output Summary (Last 24 hours) at 10/1/2022 0955  Last data filed at 10/1/2022 0545  Gross per 24 hour   Intake 600 ml   Output 3500 ml   Net -2900 ml       Physical Exam:    Constitutional: Awake, alert, NAD, chronically ill  HEENT: Sclera anicteric, no conjunctival injection  Neck: Supple, no carotid bruit, trachea at midline, no JVD  Respiratory: Improving breath sounds; nonlabored on 3 L/min  Vascular: Right chest TDC  Cardiovascular: Irregularly irregular, 2/6M, no rub  Gastrointestinal: BS +, soft, nontender, +distended  : No palpable bladder  Musculoskeletal:  No significant edema, +clubbing  Psychiatric: Flat affect, depressed mood, cooperative, oriented  Neurologic: moving all extremities, normal speech and mental status  Skin: Warm and dry    Scheduled Meds:     amLODIPine, 2.5 mg, Oral, Q24H  apixaban, 5 mg, Oral, Q12H  aspirin, 81 mg, Oral, Daily  cefTRIAXone, 1 g, Intravenous, Q24H  dilTIAZem CD, 120 mg, Oral, Q24H  epoetin brooke-epbx, 10,000 Units, Subcutaneous, Once per day on   fluconazole, 200 mg, Oral, Once per day on Sun Tue Sat   And  [START ON 10/3/2022] fluconazole, 400 mg, Oral, Once per day on Mon  folic acid, 1 mg, Oral, Daily  gabapentin, 100 mg, Oral, Daily With Lunch  Hydrocortisone (Perianal), , Rectal, BID  insulin lispro, 0-9 Units, Subcutaneous, 4x Daily With Meals & Nightly  levothyroxine, 250 mcg,  Oral, Q AM  metoprolol tartrate, 100 mg, Oral, Q12H  pantoprazole, 40 mg, Oral, Daily  rOPINIRole, 1 mg, Oral, Nightly  sertraline, 150 mg, Oral, Daily      IV Meds:   lactated ringers, 9 mL/hr        Results Reviewed:   I have personally reviewed the results from the time of this admission to 10/1/2022 09:55 EDT     Results from last 7 days   Lab Units 10/01/22  0643 09/30/22  0613 09/29/22  0611 09/27/22  0551 09/26/22  2318   SODIUM mmol/L 130* 130* 131*   < > 135*   POTASSIUM mmol/L 4.6 5.2 4.9   < > 4.4   CHLORIDE mmol/L 93* 94* 94*   < > 90*   CO2 mmol/L 23.6 20.0* 20.9*   < > 24.6   BUN mg/dL 28* 47* 41*   < > 44*   CREATININE mg/dL 3.51* 4.70* 4.44*   < > 4.80*   CALCIUM mg/dL 8.2* 7.8* 7.6*   < > 8.6   BILIRUBIN mg/dL  --   --   --   --  0.6   ALK PHOS U/L  --   --   --   --  213*   ALT (SGPT) U/L  --   --   --   --  19   AST (SGOT) U/L  --   --   --   --  25   GLUCOSE mg/dL 250* 243* 250*   < > 217*    < > = values in this interval not displayed.       Estimated Creatinine Clearance: 15.6 mL/min (A) (by C-G formula based on SCr of 3.51 mg/dL (H)).    Results from last 7 days   Lab Units 10/01/22  0643 09/30/22  0613 09/29/22  0611 09/28/22  0536 09/27/22  0551 09/26/22  2318   MAGNESIUM mg/dL  --   --  1.9 1.8  --  2.0   PHOSPHORUS mg/dL 4.8* 6.5* 5.4* 4.9*   < >  --     < > = values in this interval not displayed.             Results from last 7 days   Lab Units 10/01/22  0643 09/30/22  0613 09/29/22  0611 09/28/22  0536 09/27/22  0551   WBC 10*3/mm3 9.51 10.41 11.31* 12.15* 12.29*   HEMOGLOBIN g/dL 12.6 11.7* 11.7* 11.7* 12.4   PLATELETS 10*3/mm3 172 195 173 212 193             Assessment / Plan     ASSESSMENT:  1.  ESRD:  S/p hemodialysis yesterday.  Electrolytes okay.  Volume status improving  2.  Volume overload with bilateral pleural effusions, ascites, and diffuse anasarca; has chronic small pericardial effusion, with August '22 echo negative for tamponade  3.  Atrial fibrillation/flutter, with  rates better controlled this morning  4.  Hypertension of ESRD, exacerbated by volume overload, improving with volume removal  5.  Possible UTI:  yeast in culture  6.  Diabetes mellitus with renal complications; uncontrolled  7.  Anemia of ESRD: hemoglobin above goal  8.  Elevated ethanol level on arrival  9.  Left-sided hydroureteronephrosis with an abrupt transition point in left ureter, s/p cystoscopy with left ureteral stent placement 9/29  10.  History of noncompliance with fluid restriction and medications in general    PLAN:  1.  IUF today  2.  HD MWF  3.  Fluid restriction  4.  Surveillance lab    Thank you for involving us in the care of Zaina Martinez.  Please feel free to call with any questions.    Juan Prieto MD  10/01/22  09:55 EDT    [facility]  [phone]      Much of this encounter note is an electronic transcription/translation of spoken language to printed text. The electronic translation of spoken language may permit erroneous, or at times, nonsensical words or phrases to be inadvertently transcribed; Although I have reviewed the note for such errors, some may still exist.

## 2022-10-01 NOTE — PROGRESS NOTES
POD 2 s/p cysto, left retrograde pyelogram and left ureteral stent placement    AVSS  Cr 3.51  WBC 9.5  Hgb 12.6    Slight discomfort from stent  Discussed stent expectations    On HD  Voids spontaneously    Plan  Schedule outpatient follow up with [facility], [phone], Dr. Phan in 1-2 weeks.  Stent expectations discussed  Transition to PO abx at d/c    Please call if additional care needed

## 2022-10-01 NOTE — NURSING NOTE
HD completed with 3000mL net volume removed.  Blood was returned post treatment and lumens were locked with heparin before placing new caps on hubs.  Lumens were then wrapped with gauze and taped.    Pre VS:   Post VS:  /101  /108         Net volume removed: 3000    [facility]

## 2022-10-02 LAB
ALBUMIN SERPL-MCNC: 3.2 G/DL (ref 3.5–5.2)
ANION GAP SERPL CALCULATED.3IONS-SCNC: 14.3 MMOL/L (ref 5–15)
BACTERIA SPEC AEROBE CULT: NORMAL
BACTERIA SPEC AEROBE CULT: NORMAL
BASOPHILS # BLD AUTO: 0.09 10*3/MM3 (ref 0–0.2)
BASOPHILS NFR BLD AUTO: 1 % (ref 0–1.5)
BUN SERPL-MCNC: 36 MG/DL (ref 6–20)
BUN/CREAT SERPL: 8.8 (ref 7–25)
CALCIUM SPEC-SCNC: 8.2 MG/DL (ref 8.6–10.5)
CHLORIDE SERPL-SCNC: 95 MMOL/L (ref 98–107)
CO2 SERPL-SCNC: 22.7 MMOL/L (ref 22–29)
CREAT SERPL-MCNC: 4.11 MG/DL (ref 0.57–1)
DEPRECATED RDW RBC AUTO: 52.1 FL (ref 37–54)
EGFRCR SERPLBLD CKD-EPI 2021: 12.2 ML/MIN/1.73
EOSINOPHIL # BLD AUTO: 0.05 10*3/MM3 (ref 0–0.4)
EOSINOPHIL NFR BLD AUTO: 0.5 % (ref 0.3–6.2)
ERYTHROCYTE [DISTWIDTH] IN BLOOD BY AUTOMATED COUNT: 15.9 % (ref 12.3–15.4)
GLUCOSE BLDC GLUCOMTR-MCNC: 156 MG/DL (ref 70–130)
GLUCOSE BLDC GLUCOMTR-MCNC: 226 MG/DL (ref 70–130)
GLUCOSE BLDC GLUCOMTR-MCNC: 67 MG/DL (ref 70–130)
GLUCOSE BLDC GLUCOMTR-MCNC: 75 MG/DL (ref 70–130)
GLUCOSE SERPL-MCNC: 107 MG/DL (ref 65–99)
HCT VFR BLD AUTO: 39.2 % (ref 34–46.6)
HGB BLD-MCNC: 11.8 G/DL (ref 12–15.9)
IMM GRANULOCYTES # BLD AUTO: 0.04 10*3/MM3 (ref 0–0.05)
IMM GRANULOCYTES NFR BLD AUTO: 0.4 % (ref 0–0.5)
LYMPHOCYTES # BLD AUTO: 1.72 10*3/MM3 (ref 0.7–3.1)
LYMPHOCYTES NFR BLD AUTO: 18.7 % (ref 19.6–45.3)
MCH RBC QN AUTO: 27 PG (ref 26.6–33)
MCHC RBC AUTO-ENTMCNC: 30.1 G/DL (ref 31.5–35.7)
MCV RBC AUTO: 89.7 FL (ref 79–97)
MONOCYTES # BLD AUTO: 0.88 10*3/MM3 (ref 0.1–0.9)
MONOCYTES NFR BLD AUTO: 9.5 % (ref 5–12)
NEUTROPHILS NFR BLD AUTO: 6.44 10*3/MM3 (ref 1.7–7)
NEUTROPHILS NFR BLD AUTO: 69.9 % (ref 42.7–76)
NRBC BLD AUTO-RTO: 0.1 /100 WBC (ref 0–0.2)
PHOSPHATE SERPL-MCNC: 5.4 MG/DL (ref 2.5–4.5)
PLATELET # BLD AUTO: 158 10*3/MM3 (ref 140–450)
PMV BLD AUTO: 10.2 FL (ref 6–12)
POTASSIUM SERPL-SCNC: 5.2 MMOL/L (ref 3.5–5.2)
RBC # BLD AUTO: 4.37 10*6/MM3 (ref 3.77–5.28)
SODIUM SERPL-SCNC: 132 MMOL/L (ref 136–145)
WBC NRBC COR # BLD: 9.22 10*3/MM3 (ref 3.4–10.8)

## 2022-10-02 PROCEDURE — 82962 GLUCOSE BLOOD TEST: CPT

## 2022-10-02 PROCEDURE — 80069 RENAL FUNCTION PANEL: CPT | Performed by: INTERNAL MEDICINE

## 2022-10-02 PROCEDURE — 63710000001 INSULIN LISPRO (HUMAN) PER 5 UNITS: Performed by: UROLOGY

## 2022-10-02 PROCEDURE — 25010000002 CEFTRIAXONE PER 250 MG: Performed by: UROLOGY

## 2022-10-02 PROCEDURE — 97530 THERAPEUTIC ACTIVITIES: CPT

## 2022-10-02 PROCEDURE — 85025 COMPLETE CBC W/AUTO DIFF WBC: CPT | Performed by: UROLOGY

## 2022-10-02 RX ADMIN — HYDROCORTISONE 1 APPLICATION: 25 CREAM TOPICAL at 20:24

## 2022-10-02 RX ADMIN — ROPINIROLE 1 MG: 1 TABLET, FILM COATED ORAL at 20:24

## 2022-10-02 RX ADMIN — DILTIAZEM HYDROCHLORIDE 120 MG: 120 CAPSULE, COATED, EXTENDED RELEASE ORAL at 08:53

## 2022-10-02 RX ADMIN — LEVOTHYROXINE SODIUM 250 MCG: 0.12 TABLET ORAL at 05:26

## 2022-10-02 RX ADMIN — APIXABAN 5 MG: 5 TABLET, FILM COATED ORAL at 08:53

## 2022-10-02 RX ADMIN — METOPROLOL TARTRATE 100 MG: 50 TABLET, FILM COATED ORAL at 20:24

## 2022-10-02 RX ADMIN — FLUCONAZOLE 200 MG: 200 TABLET ORAL at 08:54

## 2022-10-02 RX ADMIN — METOPROLOL TARTRATE 100 MG: 50 TABLET, FILM COATED ORAL at 08:53

## 2022-10-02 RX ADMIN — INSULIN GLARGINE-YFGN 5 UNITS: 100 INJECTION, SOLUTION SUBCUTANEOUS at 21:46

## 2022-10-02 RX ADMIN — INSULIN LISPRO 4 UNITS: 100 INJECTION, SOLUTION INTRAVENOUS; SUBCUTANEOUS at 21:46

## 2022-10-02 RX ADMIN — AMLODIPINE BESYLATE 2.5 MG: 10 TABLET ORAL at 08:53

## 2022-10-02 RX ADMIN — Medication 1 MG: at 08:53

## 2022-10-02 RX ADMIN — INSULIN LISPRO 2 UNITS: 100 INJECTION, SOLUTION INTRAVENOUS; SUBCUTANEOUS at 08:53

## 2022-10-02 RX ADMIN — OXYCODONE 5 MG: 5 TABLET ORAL at 05:26

## 2022-10-02 RX ADMIN — APIXABAN 5 MG: 5 TABLET, FILM COATED ORAL at 20:24

## 2022-10-02 RX ADMIN — ASPIRIN 81 MG: 81 TABLET, COATED ORAL at 08:53

## 2022-10-02 RX ADMIN — SERTRALINE 150 MG: 100 TABLET, FILM COATED ORAL at 08:53

## 2022-10-02 RX ADMIN — PANTOPRAZOLE SODIUM 40 MG: 40 TABLET, DELAYED RELEASE ORAL at 08:54

## 2022-10-02 RX ADMIN — CEFTRIAXONE SODIUM 1 G: 1 INJECTION, POWDER, FOR SOLUTION INTRAMUSCULAR; INTRAVENOUS at 05:26

## 2022-10-02 NOTE — THERAPY TREATMENT NOTE
Patient Name: Zaina Martinez  : 1965    MRN: [id number]                              Today's Date: 10/2/2022       Admit Date: 2022    Visit Dx:     ICD-10-CM ICD-9-CM   1. Acute metabolic encephalopathy  G93.41 348.31   2. End stage renal disease (HCC)  N18.6 585.6   3. Acute pulmonary edema ([facility])  J81.0 518.4     Patient Active Problem List   Diagnosis   • Renal insufficiency   • Hypertensive disorder   • Hypothyroidism   • Type 2 diabetes mellitus with kidney complication, with long-term current use of insulin ([facility])   • Rheumatoid arthritis ([facility])   • Angioedema   • Esophageal dysmotility   • Anemia   • Medically noncompliant   • Myocardial infarction due to demand ischemia ([facility])   • Enteritis   • PRES (posterior reversible encephalopathy syndrome)   • Urine retention   • Klebsiella infection   • Superficial thrombophlebitis   • Generalized weakness   • ESRD (end stage renal disease) ([facility])   • CAD (coronary artery disease)   • Abnormal urinalysis   • Chronic diastolic CHF (congestive heart failure) ([facility])   • Pyelonephritis   • Calculus of gallbladder with acute on chronic cholecystitis without obstruction   • Pleural effusion on right   • Anemia due to chronic kidney disease, on chronic dialysis ([facility])   • Abnormal findings on diagnostic imaging of other specified body structures   • Acute upper respiratory infection   • Agitation   • Alkaline phosphatase raised   • Casts present in urine   • Cellulitis of toe   • Hip pain   • Community acquired pneumonia   • Depressive disorder   • Diarrhea of presumed infectious origin   • Difficult or painful urination   • Disease due to severe acute respiratory syndrome coronavirus 2 (SARS-CoV-2)   • Dyspnea   • Encounter for follow-up examination after completed treatment for conditions other than malignant neoplasm   • H/O: hypothyroidism   • Hyperlipidemia   • Hypomagnesemia   • Intractable vomiting with nausea   • Leukocytosis   • Luetscher's syndrome   • Need  for influenza vaccination   • Restless legs   • Noncompliance with treatment   • Shoulder pain   • Acute UTI (urinary tract infection)   • Metabolic encephalopathy   • Abnormal findings on diagnostic imaging of abdomen   • Status post cholecystectomy   • Hyponatremia   • Leukocytosis   • Acute metabolic encephalopathy   • Encephalopathy, toxic   • Acute CVA (cerebrovascular accident) (HCC)   • Intracranial hemorrhage (HCC)   • Stroke (HCC)   • Abnormal urinalysis   • Diabetic muscle infarction (HCC)   • Other insomnia   • Altered mental status   • History of stroke- R MCA s/p TPA with subsequent ICH with debility   • Heart murmur   • Bradycardia   • Left lower lobe pneumonia   • Metabolic encephalopathy   • Pericardial effusion   • Pleural effusion   • Atrial flutter (HCC)   • Chronic systolic CHF (congestive heart failure) (HCC)   • Thrombus of venous dialysis catheter (HCC)   • Sepsis without acute organ dysfunction (HCC)     Past Medical History:   Diagnosis Date   • Acute CVA (cerebrovascular accident) (HCC) 5/1/2022   • Acute on chronic diastolic CHF (congestive heart failure) (HCC)    • CAD (coronary artery disease) 12/20/2021   • Diabetes (HCC)    • Disease of thyroid gland    • GERD (gastroesophageal reflux disease)    • Hyperlipidemia 11/30/2018   • Hypertension    • Rheumatoid arthritis (HCC)      Past Surgical History:   Procedure Laterality Date   • CHOLECYSTECTOMY WITH INTRAOPERATIVE CHOLANGIOGRAM N/A 1/10/2022    Procedure: Laparoscopic cholecystectomy with intraoperative cholangiogram;  Surgeon: Ramana Raygoza MD;  Location: [facility];  Service: General;  Laterality: N/A;   • CYSTOSCOPY W/ URETERAL STENT PLACEMENT Left 9/29/2022    Procedure: CYSTOSCOPY URETERAL STENT INSERTION WITH RETROGRADE PYELOGRAM;  Surgeon: Bryant Phan Jr., MD;  Location: [facility];  Service: Urology;  Laterality: Left;   • EYE SURGERY     • HYSTERECTOMY     • INSERTION HEMODIALYSIS CATHETER N/A 12/6/2021     Procedure: HEMODIALYSIS CATHETER INSERTION;  Surgeon: Keli Salazar MD;  Location: [facility] OR 18/19;  Service: Vascular;  Laterality: N/A;   • INSERTION HEMODIALYSIS CATHETER N/A 5/3/2022    Procedure: TUNNELED CATHETER PLACEMENT;  Surgeon: Keli Salazar MD;  Location: [facility] OR;  Service: Vascular;  Laterality: N/A;   • MUSCLE BIOPSY Left 6/15/2022    Procedure: Left quadriceps muscle biopsy;  Surgeon: Marques Ma MD;  Location: [facility] OR;  Service: Neurosurgery;  Laterality: Left;      General Information     Row Name 10/02/22 1603          Physical Therapy Time and Intention    Document Type therapy note (daily note)  -SV     Mode of Treatment individual therapy;physical therapy  -SV     Row Name 10/02/22 1608          General Information    Patient Profile Reviewed yes  -SV           User Key  (r) = Recorded By, (t) = Taken By, (c) = Cosigned By    Initials Name Provider Type    SV Sammi Anglin PT Physical Therapist               Mobility     Row Name 10/02/22 1620          Bed Mobility    Supine-Sit [place] (Bed Mobility) moderate assist (50% patient effort);maximum assist (25% patient effort)  -SV     Sit-Supine [place] (Bed Mobility) moderate assist (50% patient effort)  -SV     Assistive Device (Bed Mobility) bed rails;draw sheet  -SV     Comment, (Bed Mobility) pt very labored with all movement, delayed responses , sat with cga after positioned on eob  -SV     Row Name 10/02/22 1620          Bed-Chair Transfer    Comment, (Bed-Chair Transfer) Pt declined bed to chair and STS today due to weakness  -SV           User Key  (r) = Recorded By, (t) = Taken By, (c) = Cosigned By    Initials Name Provider Type    SV Sammi Anglin, PT Physical Therapist               Obj/Interventions     Row Name 10/02/22 1621          Motor Skills    Therapeutic Exercise --  prom ankle circles cw/ccw, PF stretch x2 30 seconds  -SV           User Key  (r) = Recorded By, (t) =  Taken By, (c) = Cosigned By    Initials Name Provider Type    Sammi Maldoando, PT Physical Therapist               Goals/Plan    No documentation.                Clinical Impression     Row Name 10/02/22 1621          Pain    Pre/Posttreatment Pain Comment no report of pain, RN reported pain medication may have caused the weakness and lethargy  -SV           User Key  (r) = Recorded By, (t) = Taken By, (c) = Cosigned By    Initials Name Provider Type    Sammi Maldonado, PT Physical Therapist               Outcome Measures     Row Name 10/02/22 1622          How much help from another person do you currently need...    Turning from your back to your side while in flat bed without using bedrails? 2  -SV     Moving from lying on back to sitting on the side of a flat bed without bedrails? 2  -SV     Moving to and from a bed to a chair (including a wheelchair)? 1  -SV     Standing up from a chair using your arms (e.g., wheelchair, bedside chair)? 1  -SV     Climbing 3-5 steps with a railing? 1  -SV     To walk in hospital room? 1  -SV     AM-PAC 6 Clicks Score (PT) 8  -SV     Highest level of mobility 3 --> Sat at edge of bed  -SV           User Key  (r) = Recorded By, (t) = Taken By, (c) = Cosigned By    Initials Name Provider Type    Sammi Maldonado, PT Physical Therapist                             Physical Therapy Education                 Title: PT OT SLP Therapies (In Progress)     Topic: Physical Therapy (In Progress)     Point: Mobility training (Done)     Learning Progress Summary           Patient Acceptance, E,TB, VU,DU by CS at 9/29/2022 1540    Acceptance, E, VU by MG at 9/28/2022 1109                   Point: Home exercise program (Not Started)     Learner Progress:  Not documented in this visit.          Point: Body mechanics (Done)     Learning Progress Summary           Patient Acceptance, E,TB, VU,DU by CS at 9/29/2022 1540    Acceptance, E, VU by MG at 9/28/2022 1103                    Point: Precautions (Done)     Learning Progress Summary           Patient Acceptance, E,TB, VU,DU by CS at 9/29/2022 1540    Acceptance, E, VU by MG at 9/28/2022 1109                               User Key     Initials Effective Dates Name Provider Type Discipline    MG 05/24/22 -  Gisella Cabrales, PT Physical Therapist PT    CS 09/22/22 -  Chelo De La Garza, PT Physical Therapist PT              PT Recommendation and Plan           Time Calculation:    PT Charges     Row Name 10/02/22 1625             Time Calculation    Start Time 1600  -SV      Stop Time 1612  -SV      Time Calculation (min) 12 min  -SV      PT Received On 10/02/22  -SV      PT - Next Appointment 10/03/22  -SV            User Key  (r) = Recorded By, (t) = Taken By, (c) = Cosigned By    Initials Name Provider Type    SV Sammi Anglin, PT Physical Therapist              Therapy Charges for Today     Code Description Service Date Service Provider Modifiers Qty    92532722800 HC PT THERAPEUTIC ACT EA 15 MIN 10/2/2022 Sammi Anglin, PT GP 1          PT G-Codes  Outcome Measure Options: AM-PAC 6 Clicks Basic Mobility (PT)  AM-PAC 6 Clicks Score (PT): 8  AM-PAC 6 Clicks Score (OT): 19    Sammi Anglin PT  10/2/2022

## 2022-10-02 NOTE — PROGRESS NOTES
[facility] Progress Note      Patient Name: Zaina Martinez  : 1965  MRN: [id number]  Primary Care Physician:  Jane Kyle NP  Date of admission: 2022    Subjective     Interval History:     Patient very sleepy and tired, she received pain medication this morning  S/p isolated ultrafiltration yesterday.  Patient breathing comfortably.  No acute respiratory distress overnight    Review of Systems:   As noted above    Objective     Vitals:   Temp:  [97.9 °F (36.6 °C)-98.1 °F (36.7 °C)] 97.9 °F (36.6 °C)  Heart Rate:  [62-96] 62  Resp:  [16] 16  BP: (137-146)/() 138/84    Intake/Output Summary (Last 24 hours) at 10/2/2022 1000  Last data filed at 10/1/2022 1851  Gross per 24 hour   Intake --   Output 3000 ml   Net -3000 ml       Physical Exam:    Constitutional: Awake, alert, NAD, chronically ill  HEENT: Sclera anicteric, no conjunctival injection  Neck: Supple, no carotid bruit, trachea at midline, no JVD  Respiratory: Improving breath sounds; nonlabored on 3 L/min  Vascular: Right chest TDC  Cardiovascular: Irregularly irregular, 2/6M, no rub  Gastrointestinal: BS +, soft, nontender, +distended  : No palpable bladder  Musculoskeletal:  No significant edema, +clubbing  Psychiatric: Flat affect, depressed mood, cooperative, oriented  Neurologic: moving all extremities, normal speech and mental status  Skin: Warm and dry    Scheduled Meds:     amLODIPine, 2.5 mg, Oral, Q24H  apixaban, 5 mg, Oral, Q12H  aspirin, 81 mg, Oral, Daily  dilTIAZem CD, 120 mg, Oral, Q24H  epoetin brooke-epbx, 10,000 Units, Subcutaneous, Once per day on   fluconazole, 200 mg, Oral, Once per day on Sun Tue Sat   And  [START ON 10/3/2022] fluconazole, 400 mg, Oral, Once per day on Mon  folic acid, 1 mg, Oral, Daily  gabapentin, 100 mg, Oral, Daily With Lunch  Hydrocortisone (Perianal), , Rectal, BID  insulin glargine, 5 Units, Subcutaneous, Nightly  insulin lispro, 0-9 Units,  Subcutaneous, 4x Daily With Meals & Nightly  levothyroxine, 250 mcg, Oral, Q AM  metoprolol tartrate, 100 mg, Oral, Q12H  pantoprazole, 40 mg, Oral, Daily  rOPINIRole, 1 mg, Oral, Nightly  sertraline, 150 mg, Oral, Daily      IV Meds:        Results Reviewed:   I have personally reviewed the results from the time of this admission to 10/2/2022 10:00 EDT     Results from last 7 days   Lab Units 10/01/22  0643 09/30/22  0613 09/29/22  0611 09/27/22  0551 09/26/22  2318   SODIUM mmol/L 130* 130* 131*   < > 135*   POTASSIUM mmol/L 4.6 5.2 4.9   < > 4.4   CHLORIDE mmol/L 93* 94* 94*   < > 90*   CO2 mmol/L 23.6 20.0* 20.9*   < > 24.6   BUN mg/dL 28* 47* 41*   < > 44*   CREATININE mg/dL 3.51* 4.70* 4.44*   < > 4.80*   CALCIUM mg/dL 8.2* 7.8* 7.6*   < > 8.6   BILIRUBIN mg/dL  --   --   --   --  0.6   ALK PHOS U/L  --   --   --   --  213*   ALT (SGPT) U/L  --   --   --   --  19   AST (SGOT) U/L  --   --   --   --  25   GLUCOSE mg/dL 250* 243* 250*   < > 217*    < > = values in this interval not displayed.       Estimated Creatinine Clearance: 15.9 mL/min (A) (by C-G formula based on SCr of 3.51 mg/dL (H)).    Results from last 7 days   Lab Units 10/01/22  0643 09/30/22  0613 09/29/22  0611 09/28/22  0536 09/27/22  0551 09/26/22  2318   MAGNESIUM mg/dL  --   --  1.9 1.8  --  2.0   PHOSPHORUS mg/dL 4.8* 6.5* 5.4* 4.9*   < >  --     < > = values in this interval not displayed.             Results from last 7 days   Lab Units 10/02/22  0738 10/01/22  0643 09/30/22  0613 09/29/22  0611 09/28/22  0536   WBC 10*3/mm3 9.22 9.51 10.41 11.31* 12.15*   HEMOGLOBIN g/dL 11.8* 12.6 11.7* 11.7* 11.7*   PLATELETS 10*3/mm3 158 172 195 173 212             Assessment / Plan     ASSESSMENT:  1.  ESRD:  S/p hemodialysis yesterday.  Electrolytes okay.  Volume status improving  2.  Volume overload with bilateral pleural effusions, ascites, and diffuse anasarca; has chronic small pericardial effusion.  3.  Atrial fibrillation/flutter, with rates  better controlled this morning  4.  Hypertension of ESRD, blood pressure better controlled   5.  Possible UTI:  yeast in culture  6.  Diabetes mellitus with renal complications; uncontrolled  7.  Anemia of ESRD: hemoglobin above goal  8.  Elevated ethanol level on arrival  9.  Left-sided hydroureteronephrosis with an abrupt transition point in left ureter, s/p cystoscopy with left ureteral stent placement 9/29  10.  History of noncompliance with fluid restriction and medications in general    PLAN:  1.  S/p isolated ultrafiltration yesterday  2.  HD MWF  3.  Fluid restriction  4.  Surveillance lab    Thank you for involving us in the care of Zaina Martinez.  Please feel free to call with any questions.    Juan Prieto MD  10/02/22  10:00 EDT    [facility]  [phone]      Much of this encounter note is an electronic transcription/translation of spoken language to printed text. The electronic translation of spoken language may permit erroneous, or at times, nonsensical words or phrases to be inadvertently transcribed; Although I have reviewed the note for such errors, some may still exist.

## 2022-10-02 NOTE — PLAN OF CARE
Goal Outcome Evaluation:      Patient alert follows commands q2h turn/ self turn. Prn pain meds given. No nausea noted. Skin care and incont care provided. No acute distress noted will continue to monitor, plans for iv antibx to be given this shift.

## 2022-10-02 NOTE — PLAN OF CARE
Goal Outcome Evaluation:         Pt awakened to attempt PT. She was agreeable but appeared lethargic today. Pt is verbalizing slowly and moving very slowly today.  Mod/max for sup to sit today. Pt sat eob for 5-6 minutes with cga but declined STS or bed to chair due to weakness. RN notified and indicated pt had taken pain medication that had caused lethargy. Pt DF rom appears impaired and< neutral prom. PROM ankle circles and passive stretch to PF performed. Pt educated on need to perform AP while in bed.

## 2022-10-02 NOTE — PROGRESS NOTES
Name: Zaina Martinez ADMIT: 2022   : 1965  PCP: Jane Kyle NP    MRN: [id number] LOS: 5 days   AGE/SEX: 56 y.o. female  ROOM: [facility]     Subjective   Subjective   Patient seen and examined this morning. Hospital day 6.  POD #3 (10/02/2022) s/p cystoscopy with left ureteral stent insertion, no acute events overnight, no acute distress. Patient is awake, alert, oriented x3, resting comfortably. On room air with O2 sat greater than 90%, improved from prior examination.    Review of Systems   Constitutional: Negative for chills and fever.   Respiratory: Positive for shortness of breath. Negative for cough.    Cardiovascular: Negative for chest pain and palpitations.   Gastrointestinal: Negative for abdominal pain, constipation and diarrhea.   Genitourinary: Negative for dysuria, frequency and hematuria.   Neurological: Negative for dizziness and light-headedness.        Objective   Objective   Vital Signs  Temp:  [97.9 °F (36.6 °C)-98.1 °F (36.7 °C)] 97.9 °F (36.6 °C)  Heart Rate:  [62-96] 62  Resp:  [16] 16  BP: (137-146)/() 138/84  SpO2:  [94 %-98 %] 98 %  on   ;   Device (Oxygen Therapy): room air  Body mass index is 22.66 kg/m².  Physical Exam  Vitals and nursing note reviewed.   Constitutional:       Appearance: She is ill-appearing.   HENT:      Head: Atraumatic.      Right Ear: External ear normal.      Left Ear: External ear normal.   Eyes:      General: No scleral icterus.     Pupils: Pupils are equal, round, and reactive to light.   Cardiovascular:      Rate and Rhythm: Normal rate and regular rhythm.      Pulses: Normal pulses.      Heart sounds: Normal heart sounds.   Pulmonary:      Effort: Pulmonary effort is normal. No accessory muscle usage or respiratory distress.      Comments: On room air  Abdominal:      General: Bowel sounds are normal.      Palpations: Abdomen is soft.      Tenderness: There is no abdominal tenderness.   Skin:     General: Skin is warm and dry.    Neurological:      Mental Status: She is alert and oriented to person, place, and time.       Results Review     I reviewed the patient's new clinical results. WBC stable, sodium low at 130, stable and unchanged from prior.  Otherwise, BMP stable and relatively unchanged.  Results from last 7 days   Lab Units 10/02/22  0738 10/01/22  0643 09/30/22  0613 09/29/22  0611   WBC 10*3/mm3 9.22 9.51 10.41 11.31*   HEMOGLOBIN g/dL 11.8* 12.6 11.7* 11.7*   PLATELETS 10*3/mm3 158 172 195 173     Results from last 7 days   Lab Units 10/01/22  0643 09/30/22  0613 09/29/22  0611 09/28/22  0536   SODIUM mmol/L 130* 130* 131* 129*   POTASSIUM mmol/L 4.6 5.2 4.9 4.0   CHLORIDE mmol/L 93* 94* 94* 93*   CO2 mmol/L 23.6 20.0* 20.9* 26.3   BUN mg/dL 28* 47* 41* 27*   CREATININE mg/dL 3.51* 4.70* 4.44* 3.62*   GLUCOSE mg/dL 250* 243* 250* 251*   EGFR mL/min/1.73 14.7* 10.3* 11.1* 14.2*     Results from last 7 days   Lab Units 10/01/22  0643 09/30/22  0613 09/29/22  0611 09/28/22  0536 09/27/22  0551 09/26/22  2318   ALBUMIN g/dL 3.30* 3.00* 3.00* 2.90*   < > 3.80   BILIRUBIN mg/dL  --   --   --   --   --  0.6   ALK PHOS U/L  --   --   --   --   --  213*   AST (SGOT) U/L  --   --   --   --   --  25   ALT (SGPT) U/L  --   --   --   --   --  19    < > = values in this interval not displayed.     Results from last 7 days   Lab Units 10/01/22  0643 09/30/22  0613 09/29/22  0611 09/28/22  0536 09/27/22  0551 09/26/22  2318   CALCIUM mg/dL 8.2* 7.8* 7.6* 7.5*   < > 8.6   ALBUMIN g/dL 3.30* 3.00* 3.00* 2.90*   < > 3.80   MAGNESIUM mg/dL  --   --  1.9 1.8  --  2.0   PHOSPHORUS mg/dL 4.8* 6.5* 5.4* 4.9*   < >  --     < > = values in this interval not displayed.       Glucose   Date/Time Value Ref Range Status   10/02/2022 0551 156 (H) 70 - 130 mg/dL Final     Comment:     Meter: [id number] : [id number] Musa SAAVEDRA   10/01/2022 2052 259 (H) 70 - 130 mg/dL Final     Comment:     Meter: [id number] : [id number] Musa SAAVEDRA    10/01/2022 1026 356 (H) 70 - 130 mg/dL Final     Comment:     Meter: [id number] : [id number] Ej Baker NA   10/01/2022 0606 234 (H) 70 - 130 mg/dL Final     Comment:     Meter: [id number] : [id number] Musa Edmonds NA   09/30/2022 2014 262 (H) 70 - 130 mg/dL Final     Comment:     Meter: [id number] : [id number] Musa Edmonds NA   09/30/2022 1602 285 (H) 70 - 130 mg/dL Final     Comment:     Meter: [id number] : [id number] Chon Barraza A   09/30/2022 1244 166 (H) 70 - 130 mg/dL Final     Comment:     Meter: [id number] : [id number] Liz Dai RN       No radiology results for the last day  Telemetry personally reviewed by me this morning: Atrial flutter, rate controlled    Scheduled Medications  amLODIPine, 2.5 mg, Oral, Q24H  apixaban, 5 mg, Oral, Q12H  aspirin, 81 mg, Oral, Daily  dilTIAZem CD, 120 mg, Oral, Q24H  epoetin brooke-epbx, 10,000 Units, Subcutaneous, Once per day on Mon Wed Fri  fluconazole, 200 mg, Oral, Once per day on Sun Tue Sat   And  [START ON 10/3/2022] fluconazole, 400 mg, Oral, Once per day on Mon  folic acid, 1 mg, Oral, Daily  gabapentin, 100 mg, Oral, Daily With Lunch  Hydrocortisone (Perianal), , Rectal, BID  insulin glargine, 5 Units, Subcutaneous, Nightly  insulin lispro, 0-9 Units, Subcutaneous, 4x Daily With Meals & Nightly  levothyroxine, 250 mcg, Oral, Q AM  metoprolol tartrate, 100 mg, Oral, Q12H  pantoprazole, 40 mg, Oral, Daily  rOPINIRole, 1 mg, Oral, Nightly  sertraline, 150 mg, Oral, Daily    Infusions   Diet  Diet Regular; Cardiac, Daily Fluid Restriction; Other; 1,200       Assessment/Plan     Active Hospital Problems    Diagnosis  POA   • **Acute UTI (urinary tract infection) [N39.0]  Yes   • Sepsis without acute organ dysfunction (HCC) [A41.9]  Yes   • Chronic systolic CHF (congestive heart failure) ([facility]) [I50.22]  Yes   • Atrial flutter ([facility]) [I48.92]  Yes   • History of stroke- R MCA s/p TPA with subsequent ICH with debility [Z86.73]  Not  Applicable   • Acute metabolic encephalopathy [G93.41]  Yes   • Anemia due to chronic kidney disease, on chronic dialysis (HCC) [N18.6, D63.1, Z99.2]  Not Applicable   • ESRD (end stage renal disease) (HCC) [N18.6]  Yes   • CAD (coronary artery disease) [I25.10]  Yes   • Hypothyroidism [E03.9]  Yes   • Rheumatoid arthritis (HCC) [M06.9]  Yes   • Hyperlipidemia [E78.5]  Yes   • Type 2 diabetes mellitus with kidney complication, with long-term current use of insulin (HCC) [E11.29, Z79.4]  Not Applicable      Resolved Hospital Problems   No resolved problems to display.       56 y.o. female with history of HFrEF, ESRD on HD M/W/F, type 2 diabetes mellitus, CAD, atrial flutter, hypertension, hyperlipidemia, rheumatoid arthritis, hypothyroidism and anemia of end-stage renal disease who presents to [facility] with reported confusion, nausea and vomiting that began yesterday prior to arrival.  Subsequently admitted for acute on chronic HFrEF and sepsis secondary to acute urinary tract infection.    Sepsis secondary to acute urinary tract infection: Completed treatment with ceftriaxone (10/02). Urine culture growing yeast-given that she had urinary tract manipulation, treatment with fluconazole indicated.    Appears stable at this time.   Blood cultures NGTD.  Continue treatment with fluconazole.     Abnormal findings on CT Abdomen/Pelvis: Urology consulted, patient subsequently underwent cystoscopy with left ureteral stent insertion on 09/29.  Recommending scheduling outpatient follow-up with [facility], [phone], Dr. Phan in 1-2 weeks.  Appreciate their help.    Acute hypoxic respiratory failure secondary to Acute on chronic systolic heart failure: Acute respiratory failure now resolved, and is patient currently on room air, appears stable without evidence of acute respiratory distress. HD for volume removal. Nephrology and Cardiology consulted and following. Will continue to follow their  plans/recommendations. Greatly appreciate their help. Wean O2 as tolerated, continue pulse oximetry, telemetry, strict I/O, daily weights, low sodium diet.    Type 2 diabetes mellitus with insulin dependence with hyperglycemia: Currently on correctional SSI + Lantus 5 units nightly (added 10/01).  Since adding Lantus, blood glucose better controlled and within target inpatient range. Will monitor 24 hour insulin requirements and adjust as needed to achieve target inpatient range of 140-180. Continue Gabapentin for neuropathy.    Atrial flutter with RVR: Cardiology following.  Rate appears controlled on current regimen.  Continue Diltiazem and Metoprolol as currently prescribed plus home Eliquis.  Further management per cardiology. Will continue to follow Cardiology plans/recommendations. Greatly appreciate their help.    End-stage renal disease: On HD M/W/F as outpatient.  Nephrology consulted and following.  Defer management to them.    Hyponatremia: Na 130. Nephrology consulted for management.  Currently on fluid restriction.  Will continue to follow their plans/recommendations. Greatly appreciate their help.    Hypertension: BP acceptable acutely. Continue current regimen as prescribed. Closely monitor to guide further management.    Anemia of end-stage renal disease: Hemoglobin stable. No indications to warrant acute intervention. Continue EPO as prescribed. Trend hemoglobin on CBC. No indications for acute intervention at this time.  Continue to monitor, transfuse for hemoglobin <7.    Hypothyroidism: Stable.  Continue home levothyroxine as prescribed.    · Eliquis (home med) for DVT prophylaxis.  · Full code.  · Discussed with patient and nursing staff.  · Anticipate discharge home timing yet to be determined.      Jimmy Armenta DO  [facility]  10/02/22  09:14 EDT

## 2022-10-03 LAB
ALBUMIN SERPL-MCNC: 3 G/DL (ref 3.5–5.2)
ANION GAP SERPL CALCULATED.3IONS-SCNC: 16.5 MMOL/L (ref 5–15)
ARTERIAL PATENCY WRIST A: ABNORMAL
ATMOSPHERIC PRESS: 753.2 MMHG
BASE EXCESS BLDA CALC-SCNC: 1.8 MMOL/L (ref 0–2)
BASOPHILS # BLD AUTO: 0.09 10*3/MM3 (ref 0–0.2)
BASOPHILS NFR BLD AUTO: 0.8 % (ref 0–1.5)
BDY SITE: ABNORMAL
BUN SERPL-MCNC: 46 MG/DL (ref 6–20)
BUN/CREAT SERPL: 9.6 (ref 7–25)
CALCIUM SPEC-SCNC: 8.1 MG/DL (ref 8.6–10.5)
CHLORIDE SERPL-SCNC: 95 MMOL/L (ref 98–107)
CO2 SERPL-SCNC: 18.5 MMOL/L (ref 22–29)
CREAT SERPL-MCNC: 4.79 MG/DL (ref 0.57–1)
DEPRECATED RDW RBC AUTO: 49.9 FL (ref 37–54)
EGFRCR SERPLBLD CKD-EPI 2021: 10.1 ML/MIN/1.73
EOSINOPHIL # BLD AUTO: 0.05 10*3/MM3 (ref 0–0.4)
EOSINOPHIL NFR BLD AUTO: 0.4 % (ref 0.3–6.2)
ERYTHROCYTE [DISTWIDTH] IN BLOOD BY AUTOMATED COUNT: 15.8 % (ref 12.3–15.4)
GLUCOSE BLDC GLUCOMTR-MCNC: 183 MG/DL (ref 70–130)
GLUCOSE BLDC GLUCOMTR-MCNC: 205 MG/DL (ref 70–130)
GLUCOSE BLDC GLUCOMTR-MCNC: 226 MG/DL (ref 70–130)
GLUCOSE SERPL-MCNC: 235 MG/DL (ref 65–99)
HCO3 BLDA-SCNC: 25.3 MMOL/L (ref 22–28)
HCT VFR BLD AUTO: 40.8 % (ref 34–46.6)
HGB BLD-MCNC: 12.9 G/DL (ref 12–15.9)
IMM GRANULOCYTES # BLD AUTO: 0.07 10*3/MM3 (ref 0–0.05)
IMM GRANULOCYTES NFR BLD AUTO: 0.6 % (ref 0–0.5)
INHALED O2 CONCENTRATION: 21 %
LYMPHOCYTES # BLD AUTO: 1.62 10*3/MM3 (ref 0.7–3.1)
LYMPHOCYTES NFR BLD AUTO: 13.6 % (ref 19.6–45.3)
MCH RBC QN AUTO: 27.6 PG (ref 26.6–33)
MCHC RBC AUTO-ENTMCNC: 31.6 G/DL (ref 31.5–35.7)
MCV RBC AUTO: 87.4 FL (ref 79–97)
MODALITY: ABNORMAL
MONOCYTES # BLD AUTO: 0.96 10*3/MM3 (ref 0.1–0.9)
MONOCYTES NFR BLD AUTO: 8.1 % (ref 5–12)
NEUTROPHILS NFR BLD AUTO: 76.5 % (ref 42.7–76)
NEUTROPHILS NFR BLD AUTO: 9.1 10*3/MM3 (ref 1.7–7)
NRBC BLD AUTO-RTO: 0.1 /100 WBC (ref 0–0.2)
O2 A-A PPRESDIFF RESPIRATORY: 0.6 MMHG
PCO2 BLDA: 35.5 MM HG (ref 35–45)
PH BLDA: 7.46 PH UNITS (ref 7.35–7.45)
PHOSPHATE SERPL-MCNC: 6 MG/DL (ref 2.5–4.5)
PLATELET # BLD AUTO: 234 10*3/MM3 (ref 140–450)
PMV BLD AUTO: 11.2 FL (ref 6–12)
PO2 BLDA: 71.6 MM HG (ref 80–100)
POTASSIUM SERPL-SCNC: 5.5 MMOL/L (ref 3.5–5.2)
RBC # BLD AUTO: 4.67 10*6/MM3 (ref 3.77–5.28)
SAO2 % BLDCOA: 95.2 % (ref 92–99)
SODIUM SERPL-SCNC: 130 MMOL/L (ref 136–145)
TOTAL RATE: 20 BREATHS/MINUTE
WBC NRBC COR # BLD: 11.89 10*3/MM3 (ref 3.4–10.8)

## 2022-10-03 PROCEDURE — 82962 GLUCOSE BLOOD TEST: CPT

## 2022-10-03 PROCEDURE — 82803 BLOOD GASES ANY COMBINATION: CPT

## 2022-10-03 PROCEDURE — 80069 RENAL FUNCTION PANEL: CPT | Performed by: INTERNAL MEDICINE

## 2022-10-03 PROCEDURE — 85025 COMPLETE CBC W/AUTO DIFF WBC: CPT | Performed by: UROLOGY

## 2022-10-03 PROCEDURE — 25010000002 EPOETIN ALFA-EPBX 10000 UNIT/ML SOLUTION: Performed by: UROLOGY

## 2022-10-03 PROCEDURE — 63710000001 INSULIN LISPRO (HUMAN) PER 5 UNITS: Performed by: UROLOGY

## 2022-10-03 PROCEDURE — 36600 WITHDRAWAL OF ARTERIAL BLOOD: CPT

## 2022-10-03 RX ORDER — UREA 10 %
3 LOTION (ML) TOPICAL NIGHTLY
Status: DISCONTINUED | OUTPATIENT
Start: 2022-10-03 | End: 2022-10-06 | Stop reason: HOSPADM

## 2022-10-03 RX ORDER — DILTIAZEM HYDROCHLORIDE 240 MG/1
240 CAPSULE, COATED, EXTENDED RELEASE ORAL
Status: DISCONTINUED | OUTPATIENT
Start: 2022-10-04 | End: 2022-10-06

## 2022-10-03 RX ORDER — DILTIAZEM HYDROCHLORIDE 120 MG/1
120 CAPSULE, COATED, EXTENDED RELEASE ORAL ONCE
Status: COMPLETED | OUTPATIENT
Start: 2022-10-03 | End: 2022-10-03

## 2022-10-03 RX ORDER — ALBUMIN (HUMAN) 12.5 G/50ML
12.5 SOLUTION INTRAVENOUS AS NEEDED
Status: DISCONTINUED | OUTPATIENT
Start: 2022-10-03 | End: 2022-10-03 | Stop reason: SDUPTHER

## 2022-10-03 RX ORDER — ALBUMIN (HUMAN) 12.5 G/50ML
12.5 SOLUTION INTRAVENOUS AS NEEDED
Status: ACTIVE | OUTPATIENT
Start: 2022-10-03 | End: 2022-10-03

## 2022-10-03 RX ADMIN — INSULIN GLARGINE-YFGN 10 UNITS: 100 INJECTION, SOLUTION SUBCUTANEOUS at 21:33

## 2022-10-03 RX ADMIN — SERTRALINE 150 MG: 100 TABLET, FILM COATED ORAL at 13:18

## 2022-10-03 RX ADMIN — INSULIN LISPRO 4 UNITS: 100 INJECTION, SOLUTION INTRAVENOUS; SUBCUTANEOUS at 17:16

## 2022-10-03 RX ADMIN — PANTOPRAZOLE SODIUM 40 MG: 40 TABLET, DELAYED RELEASE ORAL at 13:18

## 2022-10-03 RX ADMIN — FLUCONAZOLE 400 MG: 200 TABLET ORAL at 13:18

## 2022-10-03 RX ADMIN — HYDROCORTISONE: 25 CREAM TOPICAL at 13:19

## 2022-10-03 RX ADMIN — INSULIN LISPRO 2 UNITS: 100 INJECTION, SOLUTION INTRAVENOUS; SUBCUTANEOUS at 21:33

## 2022-10-03 RX ADMIN — Medication 1 MG: at 13:18

## 2022-10-03 RX ADMIN — METOPROLOL TARTRATE 100 MG: 50 TABLET, FILM COATED ORAL at 21:22

## 2022-10-03 RX ADMIN — METOPROLOL TARTRATE 100 MG: 50 TABLET, FILM COATED ORAL at 13:18

## 2022-10-03 RX ADMIN — APIXABAN 5 MG: 5 TABLET, FILM COATED ORAL at 13:18

## 2022-10-03 RX ADMIN — ASPIRIN 81 MG: 81 TABLET, COATED ORAL at 13:18

## 2022-10-03 RX ADMIN — EPOETIN ALFA-EPBX 10000 UNITS: 10000 INJECTION, SOLUTION INTRAVENOUS; SUBCUTANEOUS at 13:18

## 2022-10-03 RX ADMIN — LEVOTHYROXINE SODIUM 250 MCG: 0.12 TABLET ORAL at 06:25

## 2022-10-03 RX ADMIN — AMLODIPINE BESYLATE 2.5 MG: 10 TABLET ORAL at 13:18

## 2022-10-03 RX ADMIN — DILTIAZEM HYDROCHLORIDE 120 MG: 120 CAPSULE, COATED, EXTENDED RELEASE ORAL at 13:18

## 2022-10-03 RX ADMIN — DILTIAZEM HYDROCHLORIDE 120 MG: 120 CAPSULE, COATED, EXTENDED RELEASE ORAL at 17:16

## 2022-10-03 RX ADMIN — APIXABAN 5 MG: 5 TABLET, FILM COATED ORAL at 21:22

## 2022-10-03 RX ADMIN — GABAPENTIN 100 MG: 100 CAPSULE ORAL at 13:18

## 2022-10-03 RX ADMIN — Medication 3 MG: at 21:21

## 2022-10-03 RX ADMIN — ROPINIROLE 1 MG: 1 TABLET, FILM COATED ORAL at 21:22

## 2022-10-03 NOTE — NURSING NOTE
Dialysis complete, removed 2 liters with this treatment, no complications noted and dressing change completed to dialysis catheter.  Vital signs are in epic.

## 2022-10-03 NOTE — PROGRESS NOTES
Name: Zaina Martinez ADMIT: 2022   : 1965  PCP: Jane Kyle NP    MRN: [id number] LOS: 6 days   AGE/SEX: 56 y.o. female  ROOM: [facility]     Subjective   Subjective   Patient seen and examined this afternoon. Hospital day 7.  POD #4 (10/03/2022) s/p cystoscopy with left ureteral stent insertion, no acute events overnight, no acute distress. Patient is awake, alert, oriented to person and place but not year. On room air with O2 sat greater than 90%, stable from prior examination.    Review of Systems   Constitutional: Negative for chills and fever.   Respiratory: Positive for shortness of breath (intermittent). Negative for cough.    Cardiovascular: Negative for chest pain and palpitations.   Gastrointestinal: Negative for abdominal pain, constipation and diarrhea.   Genitourinary: Negative for difficulty urinating, dysuria and hematuria.   Neurological: Negative for dizziness and light-headedness.        Objective   Objective   Vital Signs  Temp:  [97.4 °F (36.3 °C)-99 °F (37.2 °C)] 97.4 °F (36.3 °C)  Heart Rate:  [] 126  Resp:  [16-18] 18  BP: (104-152)/() 152/101  SpO2:  [94 %-97 %] 95 %  on  Flow (L/min):  [2] 2;   Device (Oxygen Therapy): room air  Body mass index is 23.26 kg/m².  Physical Exam  Vitals and nursing note reviewed.   Constitutional:       General: She is awake.      Appearance: She is ill-appearing.      Comments: Appears more sleepy this afternoon, initially confused on what year it was, then subsequently corrected herself with right answer   HENT:      Head: Atraumatic.      Right Ear: External ear normal.      Left Ear: External ear normal.   Eyes:      General: No visual field deficit or scleral icterus.     Pupils: Pupils are equal, round, and reactive to light.   Cardiovascular:      Rate and Rhythm: Normal rate and regular rhythm.      Pulses: Normal pulses.      Heart sounds: Normal heart sounds.   Pulmonary:      Effort: Pulmonary effort is normal. No accessory  muscle usage or respiratory distress.      Comments: On room air  Abdominal:      General: Bowel sounds are normal.      Palpations: Abdomen is soft.      Tenderness: There is no abdominal tenderness.   Skin:     General: Skin is warm and dry.   Neurological:      General: No focal deficit present.      Mental Status: She is alert.      Cranial Nerves: No facial asymmetry.   Psychiatric:         Behavior: Behavior is cooperative.       Results Review     I reviewed the patient's new clinical results. WBC slightly higher at 11.89 from prior value of 9.22.  Sodium low at 130, stable  from prior.  Potassium more elevated at 5.5, glucose more elevated and outside of target inpatient range.  Results from last 7 days   Lab Units 10/03/22  0551 10/02/22  0738 10/01/22  0643 09/30/22  0613   WBC 10*3/mm3 11.89* 9.22 9.51 10.41   HEMOGLOBIN g/dL 12.9 11.8* 12.6 11.7*   PLATELETS 10*3/mm3 234 158 172 195     Results from last 7 days   Lab Units 10/03/22  0551 10/02/22  1036 10/01/22  0643 09/30/22  0613   SODIUM mmol/L 130* 132* 130* 130*   POTASSIUM mmol/L 5.5* 5.2 4.6 5.2   CHLORIDE mmol/L 95* 95* 93* 94*   CO2 mmol/L 18.5* 22.7 23.6 20.0*   BUN mg/dL 46* 36* 28* 47*   CREATININE mg/dL 4.79* 4.11* 3.51* 4.70*   GLUCOSE mg/dL 235* 107* 250* 243*   EGFR mL/min/1.73 10.1* 12.2* 14.7* 10.3*     Results from last 7 days   Lab Units 10/03/22  0551 10/02/22  1036 10/01/22  0643 09/30/22  0613 09/27/22  0551 09/26/22  2318   ALBUMIN g/dL 3.00* 3.20* 3.30* 3.00*   < > 3.80   BILIRUBIN mg/dL  --   --   --   --   --  0.6   ALK PHOS U/L  --   --   --   --   --  213*   AST (SGOT) U/L  --   --   --   --   --  25   ALT (SGPT) U/L  --   --   --   --   --  19    < > = values in this interval not displayed.     Results from last 7 days   Lab Units 10/03/22  0551 10/02/22  1036 10/01/22  0643 09/30/22  0613 09/29/22  0611 09/28/22  0536 09/27/22  0551 09/26/22  2318   CALCIUM mg/dL 8.1* 8.2* 8.2* 7.8* 7.6* 7.5*   < > 8.6   ALBUMIN g/dL 3.00*  3.20* 3.30* 3.00* 3.00* 2.90*   < > 3.80   MAGNESIUM mg/dL  --   --   --   --  1.9 1.8  --  2.0   PHOSPHORUS mg/dL 6.0* 5.4* 4.8* 6.5* 5.4* 4.9*   < >  --     < > = values in this interval not displayed.       Glucose   Date/Time Value Ref Range Status   10/03/2022 0557 205 (H) 70 - 130 mg/dL Final     Comment:     Meter: [id number] : [id number] Nicho FALCON NA   10/02/2022 2050 226 (H) 70 - 130 mg/dL Final     Comment:     Meter: [id number] : [id number] Nicho FALCON NA   10/02/2022 1636 67 (L) 70 - 130 mg/dL Final     Comment:     Meter: [id number] : [id number] Randall Amna NA   10/02/2022 1138 75 70 - 130 mg/dL Final     Comment:     Meter: [id number] : [id number] Tiago Flores NA   10/02/2022 0551 156 (H) 70 - 130 mg/dL Final     Comment:     Meter: [id number] : [id number] Musa Edmonds    10/01/2022 2052 259 (H) 70 - 130 mg/dL Final     Comment:     Meter: [id number] : [id number] Musa Edmonds NA   10/01/2022 1026 356 (H) 70 - 130 mg/dL Final     Comment:     Meter: [id number] : [id number] Ej Baker        No radiology results for the last day  Telemetry personally reviewed by me this morning: Atrial flutter with RVR  Micro results reviewed by me: Blood cultures NGTD, urine culture with isolated yeast.    Scheduled Medications  amLODIPine, 2.5 mg, Oral, Q24H  apixaban, 5 mg, Oral, Q12H  aspirin, 81 mg, Oral, Daily  dilTIAZem CD, 120 mg, Oral, Q24H  epoetin brooke-epbx, 10,000 Units, Subcutaneous, Once per day on Mon Wed Fri  fluconazole, 200 mg, Oral, Once per day on Sun Tue Sat  folic acid, 1 mg, Oral, Daily  gabapentin, 100 mg, Oral, Daily With Lunch  Hydrocortisone (Perianal), , Rectal, BID  insulin glargine, 5 Units, Subcutaneous, Nightly  insulin lispro, 0-9 Units, Subcutaneous, 4x Daily With Meals & Nightly  levothyroxine, 250 mcg, Oral, Q AM  metoprolol tartrate, 100 mg, Oral, Q12H  pantoprazole, 40 mg, Oral, Daily  rOPINIRole, 1 mg, Oral,  Nightly  sertraline, 150 mg, Oral, Daily    Infusions   Diet  Diet Regular; Cardiac, Daily Fluid Restriction; Other; 1,200       Assessment/Plan     Active Hospital Problems    Diagnosis  POA   • **Acute UTI (urinary tract infection) [N39.0]  Yes   • Sepsis without acute organ dysfunction ([facility]) [A41.9]  Yes   • Chronic systolic CHF (congestive heart failure) ([facility]) [I50.22]  Yes   • Atrial flutter ([facility]) [I48.92]  Yes   • History of stroke- R MCA s/p TPA with subsequent ICH with debility [Z86.73]  Not Applicable   • Acute metabolic encephalopathy [G93.41]  Yes   • Anemia due to chronic kidney disease, on chronic dialysis ([facility]) [N18.6, D63.1, Z99.2]  Not Applicable   • ESRD (end stage renal disease) ([facility]) [N18.6]  Yes   • CAD (coronary artery disease) [I25.10]  Yes   • Hypothyroidism [E03.9]  Yes   • Rheumatoid arthritis ([facility]) [M06.9]  Yes   • Hyperlipidemia [E78.5]  Yes   • Type 2 diabetes mellitus with kidney complication, with long-term current use of insulin ([facility]) [E11.29, Z79.4]  Not Applicable      Resolved Hospital Problems   No resolved problems to display.       56 y.o. female with history of HFrEF, ESRD on HD M/W/F, type 2 diabetes mellitus, CAD, atrial flutter, hypertension, hyperlipidemia, rheumatoid arthritis, hypothyroidism and anemia of end-stage renal disease who presents to [facility] with reported confusion, nausea and vomiting that began yesterday prior to arrival.  Subsequently admitted for acute on chronic HFrEF and sepsis secondary to acute urinary tract infection.    Sepsis secondary to acute urinary tract infection: Completed treatment with ceftriaxone (10/02). Urine culture growing yeast-given that she had urinary tract manipulation, decision made to treat. Appears stable at this time.   Blood cultures NGTD.  Continue treatment with fluconazole.     Abnormal findings on CT Abdomen/Pelvis: Urology consulted, patient subsequently underwent cystoscopy with left ureteral stent  insertion on 09/29.  Recommending scheduling outpatient follow-up with [facility], [phone], Dr. Phan in 1-2 weeks.  Appreciate their help.    Acute hypoxic respiratory failure secondary to Acute on chronic systolic heart failure: Acute respiratory failure now resolved, and is patient currently on room air, appears stable without evidence of acute respiratory distress. HD for volume removal. Nephrology and Cardiology consulted and following. Will continue to follow their plans/recommendations. Greatly appreciate their help. Wean O2 as tolerated, continue pulse oximetry, telemetry, strict I/O, daily weights, low sodium diet.    Type 2 diabetes mellitus with insulin dependence with hyperglycemia: Currently on correctional SSI + Lantus 5 units nightly (added 10/01).  Despite increase in insulin regimen, she remains hyperglycemic and outside target inpatient range. Warrants adjustment.  - Increase Lantus to 10 units QHS. Continue correctional SSI.  -  Will monitor 24 hour insulin requirements and adjust as needed to achieve target inpatient range of 140-180. Continue Gabapentin for neuropathy.    Atrial flutter with RVR: Cardiology following.  Rate more elevated and uncontrolled today (10/03). On Diltiazem and Metoprolol as currently prescribed plus home Eliquis.  Discussed with nurse, is reaching out to cardiology for them to come and evaluate. Further management per cardiology. Will continue to follow Cardiology plans/recommendations. Greatly appreciate their help.    End-stage renal disease: On HD M/W/F as outpatient.  Nephrology consulted and following.  Defer management to them.    Hyponatremia: Na 130. Nephrology consulted for management.  Currently on fluid restriction.  Will continue to follow their plans/recommendations. Greatly appreciate their help.    Hypertension: BP acceptable acutely. Continue current regimen as prescribed. Closely monitor to guide further management.    Anemia of end-stage  renal disease: Hemoglobin stable. No indications to warrant acute intervention. Continue EPO as prescribed. Trend hemoglobin on CBC. No indications for acute intervention at this time.  Continue to monitor, transfuse for hemoglobin <7.    Hypothyroidism: Stable.  Continue home levothyroxine as prescribed.    Transient alteration in awareness:   - Noted today (10/03). Patient appears much more sleepy on today's exam when compared to prior, she is stating that she did not sleep at all of the night prior.  She was awake, alert and oriented to person and place, however, initially guessed incorrectly on what year it was, but subsequently corrected herself with the right answer.  - Patient afebrile, no evidence at this time to suggest new development of infection.  No evidence of focal neurologic deficits on physical exam.  She was not wearing oxygen at time of my examination, concerned that she could be retaining CO2 leading to altered mental status.  - Order stat ABG.  - Order melatonin for sleep at night.  - Delirium precautions: Evidence-based delirium precautions include: Frequent reorientation, reminding patient not questioning patient, Shades up during day/down at night, TV off except for soft music only, Familiar objects at bedside, Encourage friends/family to spend the night, Minimize anticholingeric medications ,Minimize polypharmacy, Minimize restraints, including lines and/or foleys and/or feeding tubes as able, Minimize overnight checks/vitals to encourage restful consistent sleep patterns, Out of bed during day as much as possible   - Order repeat BMP, CBC, mag, phos for reassessment in AM.  - Can consider repeating infectious workup if develops signs/symptoms concerning for infection.      · Eliquis (home med) for DVT prophylaxis.  · Full code.  · Discussed with patient and nursing staff.  · Anticipate discharge home timing yet to be determined.      Jimmy Armenta DO  [facility]  10/03/22  13:56 EDT

## 2022-10-03 NOTE — PLAN OF CARE
Goal Outcome Evaluation:    Pt. A&O x 4, R/A , denies pain, had small BM. No acute distress noted.        Problem: Adult Inpatient Plan of Care  Goal: Plan of Care Review  Outcome: Ongoing, Progressing  Goal: Patient-Specific Goal (Individualized)  Outcome: Ongoing, Progressing  Goal: Absence of Hospital-Acquired Illness or Injury  Outcome: Ongoing, Progressing  Intervention: Identify and Manage Fall Risk  Recent Flowsheet Documentation  Taken 10/3/2022 0626 by Markus Gonzales RN  Safety Promotion/Fall Prevention:   safety round/check completed   activity supervised  Taken 10/3/2022 0417 by Markus Gonzales RN  Safety Promotion/Fall Prevention:   safety round/check completed   activity supervised  Taken 10/3/2022 0221 by Markus Gonzales RN  Safety Promotion/Fall Prevention:   safety round/check completed   activity supervised  Taken 10/3/2022 0014 by Markus Gonzales RN  Safety Promotion/Fall Prevention:   safety round/check completed   activity supervised  Taken 10/2/2022 2201 by Markus Gonzales RN  Safety Promotion/Fall Prevention:   safety round/check completed   activity supervised  Taken 10/2/2022 2028 by Markus Gonzales RN  Safety Promotion/Fall Prevention:   safety round/check completed   activity supervised   room organization consistent   clutter free environment maintained  Intervention: Prevent Skin Injury  Recent Flowsheet Documentation  Taken 10/3/2022 0626 by Markus Gonzales RN  Body Position: supine  Taken 10/3/2022 0417 by Markus Gonzales RN  Body Position: supine  Taken 10/3/2022 0221 by Markus Gonzales RN  Body Position:   tilted   right  Taken 10/3/2022 0014 by Markus Gonzales RN  Body Position: supine  Taken 10/2/2022 2201 by Markus Gonzales RN  Body Position:   tilted   left  Taken 10/2/2022 2028 by Markus Gonzales RN  Body Position:   tilted   left  Skin Protection: adhesive use limited  Intervention: Prevent and Manage VTE (Venous Thromboembolism) Risk  Recent  Flowsheet Documentation  Taken 10/3/2022 0014 by Markus Gonzales RN  Activity Management: activity adjusted per tolerance  VTE Prevention/Management: (eliquis) other (see comments)  Taken 10/2/2022 2028 by Markus Gonzales RN  Activity Management: activity adjusted per tolerance  VTE Prevention/Management: (eliquis) other (see comments)  Range of Motion: active ROM (range of motion) encouraged  Intervention: Prevent Infection  Recent Flowsheet Documentation  Taken 10/3/2022 0626 by Markus Gonzales RN  Infection Prevention:   rest/sleep promoted   hand hygiene promoted  Taken 10/3/2022 0417 by Markus Gonzales RN  Infection Prevention:   hand hygiene promoted   rest/sleep promoted  Taken 10/3/2022 0221 by Markus Gonzales RN  Infection Prevention:   rest/sleep promoted   hand hygiene promoted  Taken 10/3/2022 0014 by Markus Gonzales RN  Infection Prevention:   hand hygiene promoted   rest/sleep promoted  Taken 10/2/2022 2201 by Markus Gonzales RN  Infection Prevention:   single patient room provided   rest/sleep promoted   hand hygiene promoted  Taken 10/2/2022 2028 by Markus Goznales RN  Infection Prevention:   single patient room provided   rest/sleep promoted   hand hygiene promoted  Goal: Optimal Comfort and Wellbeing  Outcome: Ongoing, Progressing  Intervention: Provide Person-Centered Care  Recent Flowsheet Documentation  Taken 10/3/2022 0014 by Markus Gonzales RN  Trust Relationship/Rapport: care explained  Taken 10/2/2022 2028 by Markus Gonzales RN  Trust Relationship/Rapport:   care explained   emotional support provided   questions answered  Goal: Readiness for Transition of Care  Outcome: Ongoing, Progressing     Problem: Fall Injury Risk  Goal: Absence of Fall and Fall-Related Injury  Outcome: Ongoing, Progressing  Intervention: Identify and Manage Contributors  Recent Flowsheet Documentation  Taken 10/3/2022 0626 by Markus Gonzales RN  Medication Review/Management:  medications reviewed  Taken 10/3/2022 0417 by Markus Gonzales RN  Medication Review/Management: medications reviewed  Taken 10/3/2022 0221 by Markus Gonzales RN  Medication Review/Management: medications reviewed  Taken 10/3/2022 0014 by Markus Gonzales RN  Medication Review/Management: medications reviewed  Taken 10/2/2022 2201 by Markus Gonzales RN  Medication Review/Management: medications reviewed  Taken 10/2/2022 2028 by Markus Gonzales RN  Medication Review/Management: medications reviewed  Self-Care Promotion: independence encouraged  Intervention: Promote Injury-Free Environment  Recent Flowsheet Documentation  Taken 10/3/2022 0626 by Markus Gonzales RN  Safety Promotion/Fall Prevention:   safety round/check completed   activity supervised  Taken 10/3/2022 0417 by Markus Gonzales RN  Safety Promotion/Fall Prevention:   safety round/check completed   activity supervised  Taken 10/3/2022 0221 by Markus Gonzales RN  Safety Promotion/Fall Prevention:   safety round/check completed   activity supervised  Taken 10/3/2022 0014 by Markus Gonzales RN  Safety Promotion/Fall Prevention:   safety round/check completed   activity supervised  Taken 10/2/2022 2201 by Markus Gonzales RN  Safety Promotion/Fall Prevention:   safety round/check completed   activity supervised  Taken 10/2/2022 2028 by Markus Gonzales RN  Safety Promotion/Fall Prevention:   safety round/check completed   activity supervised   room organization consistent   clutter free environment maintained     Problem: UTI (Urinary Tract Infection)  Goal: Improved Infection Symptoms  Outcome: Ongoing, Progressing  Intervention: Facilitate Optimal Urinary Elimination  Recent Flowsheet Documentation  Taken 10/2/2022 2028 by Markus Gonzales RN  Urinary Elimination Promotion: toileting offered  Intervention: Prevent Infection Progression  Recent Flowsheet Documentation  Taken 10/2/2022 2028 by Markus Gonzales RN  Infection  Management: aseptic technique maintained     Problem: Device-Related Complication Risk (Hemodialysis)  Goal: Safe, Effective Therapy Delivery  Outcome: Ongoing, Progressing  Intervention: Optimize Device Care and Function  Recent Flowsheet Documentation  Taken 10/3/2022 0626 by Markus Gonzales RN  Medication Review/Management: medications reviewed  Taken 10/3/2022 0417 by Markus Gonzales RN  Medication Review/Management: medications reviewed  Taken 10/3/2022 0221 by Markus Gonzales RN  Medication Review/Management: medications reviewed  Taken 10/3/2022 0014 by Markus Gonzales RN  Medication Review/Management: medications reviewed  Taken 10/2/2022 2201 by Markus Gonzales RN  Medication Review/Management: medications reviewed  Taken 10/2/2022 2028 by Markus Gonzales RN  Medication Review/Management: medications reviewed     Problem: Hemodynamic Instability (Hemodialysis)  Goal: Effective Tissue Perfusion  Outcome: Ongoing, Progressing  Intervention: Optimize Blood Flow  Recent Flowsheet Documentation  Taken 10/2/2022 2028 by Markus Gonzales RN  Stabilization Measures: legs elevated     Problem: Infection (Hemodialysis)  Goal: Absence of Infection Signs and Symptoms  Outcome: Ongoing, Progressing     Problem: Dysrhythmia  Goal: Normalized Cardiac Rhythm  Outcome: Ongoing, Progressing  Intervention: Monitor and Manage Cardiac Rhythm Effect  Recent Flowsheet Documentation  Taken 10/3/2022 0014 by Markus Gonzales RN  VTE Prevention/Management: (eliquis) other (see comments)  Taken 10/2/2022 2028 by Markus Gonzales RN  Stabilization Measures: legs elevated  VTE Prevention/Management: (eliquis) other (see comments)

## 2022-10-03 NOTE — PLAN OF CARE
Problem: Adult Inpatient Plan of Care  Goal: Plan of Care Review  Outcome: Ongoing, Progressing  Flowsheets (Taken 10/3/2022 1434)  Progress: improving  Plan of Care Reviewed With: patient  Outcome Evaluation: Patient has been cooperative during shift. Patient had 2L removed in dialysis. No complaints of pain, nausea or SOA. AOx4, room air, assist x1. Patient had bowel movement.Will continue to monitor and assist patient as needed.  Goal: Patient-Specific Goal (Individualized)  Outcome: Ongoing, Progressing  Goal: Absence of Hospital-Acquired Illness or Injury  Outcome: Ongoing, Progressing  Intervention: Identify and Manage Fall Risk  Recent Flowsheet Documentation  Taken 10/3/2022 1330 by Frederic Love RN  Safety Promotion/Fall Prevention:   assistive device/personal items within reach   fall prevention program maintained   nonskid shoes/slippers when out of bed   safety round/check completed  Taken 10/3/2022 1152 by Frederic Love RN  Safety Promotion/Fall Prevention: patient off unit  Taken 10/3/2022 0949 by Frederic Love RN  Safety Promotion/Fall Prevention: patient off unit  Taken 10/3/2022 0812 by Frederic Love RN  Safety Promotion/Fall Prevention:   assistive device/personal items within reach   fall prevention program maintained   nonskid shoes/slippers when out of bed   safety round/check completed  Intervention: Prevent Skin Injury  Recent Flowsheet Documentation  Taken 10/3/2022 1330 by Frederic Love RN  Body Position: dangle, side of bed  Taken 10/3/2022 0812 by Frederic Love RN  Body Position:   left   side-lying  Intervention: Prevent and Manage VTE (Venous Thromboembolism) Risk  Recent Flowsheet Documentation  Taken 10/3/2022 1330 by Frederic Love RN  Activity Management:   activity adjusted per tolerance   sitting, edge of bed  Taken 10/3/2022 0812 by Frederic Love RN  Activity Management: activity adjusted per tolerance  Goal: Optimal Comfort and Wellbeing  Outcome:  Ongoing, Progressing  Goal: Readiness for Transition of Care  Outcome: Ongoing, Progressing     Problem: Fall Injury Risk  Goal: Absence of Fall and Fall-Related Injury  Outcome: Ongoing, Progressing  Intervention: Promote Injury-Free Environment  Recent Flowsheet Documentation  Taken 10/3/2022 1330 by Frederic Love RN  Safety Promotion/Fall Prevention:   assistive device/personal items within reach   fall prevention program maintained   nonskid shoes/slippers when out of bed   safety round/check completed  Taken 10/3/2022 1152 by Frederic Love RN  Safety Promotion/Fall Prevention: patient off unit  Taken 10/3/2022 0949 by Frederic Love RN  Safety Promotion/Fall Prevention: patient off unit  Taken 10/3/2022 0812 by Frederic Love RN  Safety Promotion/Fall Prevention:   assistive device/personal items within reach   fall prevention program maintained   nonskid shoes/slippers when out of bed   safety round/check completed     Problem: UTI (Urinary Tract Infection)  Goal: Improved Infection Symptoms  Outcome: Ongoing, Progressing     Problem: Device-Related Complication Risk (Hemodialysis)  Goal: Safe, Effective Therapy Delivery  Outcome: Ongoing, Progressing     Problem: Hemodynamic Instability (Hemodialysis)  Goal: Effective Tissue Perfusion  Outcome: Ongoing, Progressing     Problem: Infection (Hemodialysis)  Goal: Absence of Infection Signs and Symptoms  Outcome: Ongoing, Progressing     Problem: Dysrhythmia  Goal: Normalized Cardiac Rhythm  Outcome: Ongoing, Progressing   Goal Outcome Evaluation:  Plan of Care Reviewed With: patient        Progress: improving  Outcome Evaluation: Patient has been cooperative during shift. Patient had 2L removed in dialysis. No complaints of pain, nausea or SOA. AOx4, room air, assist x1. Patient had bowel movement.Will continue to monitor and assist patient as needed.

## 2022-10-03 NOTE — PROGRESS NOTES
[facility] Progress Note      Patient Name: Zaina Martinez  : 1965  MRN: [id number]  Primary Care Physician:  Jane Kyle NP  Date of admission: 2022    Subjective     Interval History:   Had HD earlier today; 2 L removed  Breathing has improved; saturating well on room air  Appetite still mediocre; no N/V  Despite many sessions of dialysis and extra iUF, weight has not dropped much  She reports compliance with fluid restriction    Review of Systems:   As noted above    Objective     Vitals:   Temp:  [97.4 °F (36.3 °C)-99 °F (37.2 °C)] 98.8 °F (37.1 °C)  Heart Rate:  [] 125  Resp:  [16-18] 18  BP: (104-152)/() 130/119  Flow (L/min):  [2] 2    Intake/Output Summary (Last 24 hours) at 10/3/2022 1729  Last data filed at 10/3/2022 1228  Gross per 24 hour   Intake --   Output 2000 ml   Net -2000 ml       Physical Exam:    Constitutional: Awake, alert, NAD, chronically ill  HEENT: Sclera anicteric, no conjunctival injection  Neck: Supple, no carotid bruit, trachea at midline, no JVD  Respiratory: Coarse but mostly clear; no wheezes; nonlabored on room air  Vascular: Right chest TDC  Cardiovascular: Irregularly irregular, 2/6M, no rub  Gastrointestinal: BS +, soft, nontender, +distended  : No palpable bladder  Musculoskeletal:  No significant edema, +clubbing  Psychiatric: Flat affect, depressed mood, cooperative, oriented  Neurologic: moving all extremities, normal speech and mental status  Skin: Warm and dry    Scheduled Meds:     amLODIPine, 2.5 mg, Oral, Q24H  apixaban, 5 mg, Oral, Q12H  aspirin, 81 mg, Oral, Daily  [START ON 10/4/2022] dilTIAZem CD, 240 mg, Oral, Q24H  epoetin brooke-epbx, 10,000 Units, Subcutaneous, Once per day on   fluconazole, 200 mg, Oral, Once per day on Sun Tue Sat  folic acid, 1 mg, Oral, Daily  gabapentin, 100 mg, Oral, Daily With Lunch  Hydrocortisone (Perianal), , Rectal, BID  insulin glargine, 10 Units, Subcutaneous,  Nightly  insulin lispro, 0-9 Units, Subcutaneous, 4x Daily With Meals & Nightly  levothyroxine, 250 mcg, Oral, Q AM  melatonin, 3 mg, Oral, Nightly  metoprolol tartrate, 100 mg, Oral, Q12H  pantoprazole, 40 mg, Oral, Daily  rOPINIRole, 1 mg, Oral, Nightly  sertraline, 150 mg, Oral, Daily      IV Meds:        Results Reviewed:   I have personally reviewed the results from the time of this admission to 10/3/2022 17:29 EDT     Results from last 7 days   Lab Units 10/03/22  0551 10/02/22  1036 10/01/22  0643 09/27/22  0551 09/26/22  2318   SODIUM mmol/L 130* 132* 130*   < > 135*   POTASSIUM mmol/L 5.5* 5.2 4.6   < > 4.4   CHLORIDE mmol/L 95* 95* 93*   < > 90*   CO2 mmol/L 18.5* 22.7 23.6   < > 24.6   BUN mg/dL 46* 36* 28*   < > 44*   CREATININE mg/dL 4.79* 4.11* 3.51*   < > 4.80*   CALCIUM mg/dL 8.1* 8.2* 8.2*   < > 8.6   BILIRUBIN mg/dL  --   --   --   --  0.6   ALK PHOS U/L  --   --   --   --  213*   ALT (SGPT) U/L  --   --   --   --  19   AST (SGOT) U/L  --   --   --   --  25   GLUCOSE mg/dL 235* 107* 250*   < > 217*    < > = values in this interval not displayed.       Estimated Creatinine Clearance: 11.9 mL/min (A) (by C-G formula based on SCr of 4.79 mg/dL (H)).    Results from last 7 days   Lab Units 10/03/22  0551 10/02/22  1036 10/01/22  0643 09/30/22  0613 09/29/22  0611 09/28/22  0536 09/27/22  0551 09/26/22  2318   MAGNESIUM mg/dL  --   --   --   --  1.9 1.8  --  2.0   PHOSPHORUS mg/dL 6.0* 5.4* 4.8*   < > 5.4* 4.9*   < >  --     < > = values in this interval not displayed.             Results from last 7 days   Lab Units 10/03/22  0551 10/02/22  0738 10/01/22  0643 09/30/22  0613 09/29/22  0611   WBC 10*3/mm3 11.89* 9.22 9.51 10.41 11.31*   HEMOGLOBIN g/dL 12.9 11.8* 12.6 11.7* 11.7*   PLATELETS 10*3/mm3 234 158 172 195 173             Assessment / Plan     ASSESSMENT:  1.  ESRD:  S/p hemodialysis today.  Hypervolemic hyponatremia; hyperK pre-HD  2.  Volume overload with bilateral pleural effusions,  ascites, and diffuse anasarca; has chronic small pericardial effusion.  3.  Atrial fibrillation/flutter, with rates uncontrolled  4.  Hypertension of ESRD, labile  5.  Possible UTI:  yeast in culture  6.  Diabetes mellitus with renal complications; uncontrolled  7.  Anemia of ESRD: hemoglobin above goal  8.  Elevated ethanol level on arrival  9.  Left-sided hydroureteronephrosis with an abrupt transition point in left ureter, s/p cystoscopy with left ureteral stent placement 9/29  10.  History of noncompliance with fluid restriction and medications in general    PLAN:  1.  HD MWF  2.  Will again ask nursing to ensure fluid restriction being followed  3.  Stop ANDRAE with hemoglobin greater than 12    Thank you for involving us in the care of Zaina Martinez.  Please feel free to call with any questions.    Alejo Lange MD  10/03/22  17:29 EDT    [facility]  [phone]      Much of this encounter note is an electronic transcription/translation of spoken language to printed text. The electronic translation of spoken language may permit erroneous, or at times, nonsensical words or phrases to be inadvertently transcribed; Although I have reviewed the note for such errors, some may still exist.

## 2022-10-04 LAB
ALBUMIN SERPL-MCNC: 2.7 G/DL (ref 3.5–5.2)
ANION GAP SERPL CALCULATED.3IONS-SCNC: 14.9 MMOL/L (ref 5–15)
BASOPHILS # BLD AUTO: 0.07 10*3/MM3 (ref 0–0.2)
BASOPHILS NFR BLD AUTO: 0.8 % (ref 0–1.5)
BUN SERPL-MCNC: 23 MG/DL (ref 6–20)
BUN/CREAT SERPL: 7.6 (ref 7–25)
CALCIUM SPEC-SCNC: 7.9 MG/DL (ref 8.6–10.5)
CHLORIDE SERPL-SCNC: 97 MMOL/L (ref 98–107)
CO2 SERPL-SCNC: 20.1 MMOL/L (ref 22–29)
CREAT SERPL-MCNC: 3.01 MG/DL (ref 0.57–1)
DEPRECATED RDW RBC AUTO: 47.3 FL (ref 37–54)
EGFRCR SERPLBLD CKD-EPI 2021: 17.7 ML/MIN/1.73
EOSINOPHIL # BLD AUTO: 0.06 10*3/MM3 (ref 0–0.4)
EOSINOPHIL NFR BLD AUTO: 0.7 % (ref 0.3–6.2)
ERYTHROCYTE [DISTWIDTH] IN BLOOD BY AUTOMATED COUNT: 15.5 % (ref 12.3–15.4)
GLUCOSE BLDC GLUCOMTR-MCNC: 128 MG/DL (ref 70–130)
GLUCOSE BLDC GLUCOMTR-MCNC: 165 MG/DL (ref 70–130)
GLUCOSE BLDC GLUCOMTR-MCNC: 203 MG/DL (ref 70–130)
GLUCOSE BLDC GLUCOMTR-MCNC: 260 MG/DL (ref 70–130)
GLUCOSE SERPL-MCNC: 129 MG/DL (ref 65–99)
HCT VFR BLD AUTO: 38.9 % (ref 34–46.6)
HGB BLD-MCNC: 12.5 G/DL (ref 12–15.9)
IMM GRANULOCYTES # BLD AUTO: 0.04 10*3/MM3 (ref 0–0.05)
IMM GRANULOCYTES NFR BLD AUTO: 0.5 % (ref 0–0.5)
LYMPHOCYTES # BLD AUTO: 2.09 10*3/MM3 (ref 0.7–3.1)
LYMPHOCYTES NFR BLD AUTO: 24.2 % (ref 19.6–45.3)
MCH RBC QN AUTO: 27.1 PG (ref 26.6–33)
MCHC RBC AUTO-ENTMCNC: 32.1 G/DL (ref 31.5–35.7)
MCV RBC AUTO: 84.4 FL (ref 79–97)
MONOCYTES # BLD AUTO: 0.82 10*3/MM3 (ref 0.1–0.9)
MONOCYTES NFR BLD AUTO: 9.5 % (ref 5–12)
NEUTROPHILS NFR BLD AUTO: 5.57 10*3/MM3 (ref 1.7–7)
NEUTROPHILS NFR BLD AUTO: 64.3 % (ref 42.7–76)
NRBC BLD AUTO-RTO: 0.1 /100 WBC (ref 0–0.2)
PHOSPHATE SERPL-MCNC: 3.8 MG/DL (ref 2.5–4.5)
PLATELET # BLD AUTO: 185 10*3/MM3 (ref 140–450)
PMV BLD AUTO: 10.7 FL (ref 6–12)
POTASSIUM SERPL-SCNC: 4.4 MMOL/L (ref 3.5–5.2)
RBC # BLD AUTO: 4.61 10*6/MM3 (ref 3.77–5.28)
SODIUM SERPL-SCNC: 132 MMOL/L (ref 136–145)
TSH SERPL DL<=0.05 MIU/L-ACNC: 44.8 UIU/ML (ref 0.27–4.2)
WBC NRBC COR # BLD: 8.65 10*3/MM3 (ref 3.4–10.8)

## 2022-10-04 PROCEDURE — 80069 RENAL FUNCTION PANEL: CPT | Performed by: INTERNAL MEDICINE

## 2022-10-04 PROCEDURE — 84443 ASSAY THYROID STIM HORMONE: CPT | Performed by: INTERNAL MEDICINE

## 2022-10-04 PROCEDURE — 85025 COMPLETE CBC W/AUTO DIFF WBC: CPT | Performed by: UROLOGY

## 2022-10-04 PROCEDURE — 97530 THERAPEUTIC ACTIVITIES: CPT

## 2022-10-04 PROCEDURE — 82962 GLUCOSE BLOOD TEST: CPT

## 2022-10-04 PROCEDURE — 63710000001 INSULIN LISPRO (HUMAN) PER 5 UNITS: Performed by: UROLOGY

## 2022-10-04 PROCEDURE — 99231 SBSQ HOSP IP/OBS SF/LOW 25: CPT | Performed by: NURSE PRACTITIONER

## 2022-10-04 RX ADMIN — DILTIAZEM HYDROCHLORIDE 240 MG: 240 CAPSULE, COATED, EXTENDED RELEASE ORAL at 08:15

## 2022-10-04 RX ADMIN — HYDROCORTISONE: 25 CREAM TOPICAL at 08:15

## 2022-10-04 RX ADMIN — METOPROLOL TARTRATE 100 MG: 50 TABLET, FILM COATED ORAL at 21:19

## 2022-10-04 RX ADMIN — FLUCONAZOLE 200 MG: 200 TABLET ORAL at 08:15

## 2022-10-04 RX ADMIN — Medication 3 MG: at 21:17

## 2022-10-04 RX ADMIN — APIXABAN 5 MG: 5 TABLET, FILM COATED ORAL at 21:16

## 2022-10-04 RX ADMIN — AMLODIPINE BESYLATE 2.5 MG: 10 TABLET ORAL at 08:15

## 2022-10-04 RX ADMIN — SERTRALINE 150 MG: 100 TABLET, FILM COATED ORAL at 08:15

## 2022-10-04 RX ADMIN — INSULIN LISPRO 6 UNITS: 100 INJECTION, SOLUTION INTRAVENOUS; SUBCUTANEOUS at 16:31

## 2022-10-04 RX ADMIN — INSULIN LISPRO 4 UNITS: 100 INJECTION, SOLUTION INTRAVENOUS; SUBCUTANEOUS at 11:35

## 2022-10-04 RX ADMIN — INSULIN GLARGINE-YFGN 10 UNITS: 100 INJECTION, SOLUTION SUBCUTANEOUS at 21:19

## 2022-10-04 RX ADMIN — Medication 1 MG: at 08:15

## 2022-10-04 RX ADMIN — PANTOPRAZOLE SODIUM 40 MG: 40 TABLET, DELAYED RELEASE ORAL at 08:15

## 2022-10-04 RX ADMIN — INSULIN LISPRO 2 UNITS: 100 INJECTION, SOLUTION INTRAVENOUS; SUBCUTANEOUS at 21:16

## 2022-10-04 RX ADMIN — ASPIRIN 81 MG: 81 TABLET, COATED ORAL at 08:15

## 2022-10-04 RX ADMIN — ROPINIROLE 1 MG: 1 TABLET, FILM COATED ORAL at 21:16

## 2022-10-04 RX ADMIN — METOPROLOL TARTRATE 100 MG: 50 TABLET, FILM COATED ORAL at 08:15

## 2022-10-04 RX ADMIN — APIXABAN 5 MG: 5 TABLET, FILM COATED ORAL at 08:15

## 2022-10-04 RX ADMIN — LEVOTHYROXINE SODIUM 250 MCG: 0.12 TABLET ORAL at 06:37

## 2022-10-04 NOTE — PROGRESS NOTES
[facility] Progress Note      Patient Name: Zaina Martinez  : 1965  MRN: [id number]  Primary Care Physician:  Jane Kyle NP  Date of admission: 2022    Subjective     Interval History:   Follow up ESRD.  Dialysis yesterday with 2 L off.  Hungry. Feels weak and walking difficult. Dr. tapia noted increased lethargy mid day yesterday.  This is when she gets neurontin. Had been on this for left thigh pain.     Review of Systems:   As noted above    Objective     Vitals:   Temp:  [97.4 °F (36.3 °C)-98.8 °F (37.1 °C)] 98.3 °F (36.8 °C)  Heart Rate:  [] 60  Resp:  [16-18] 18  BP: (121-152)/() 138/86    Intake/Output Summary (Last 24 hours) at 10/4/2022 0807  Last data filed at 10/4/2022 0600  Gross per 24 hour   Intake 900 ml   Output 2000 ml   Net -1100 ml       Physical Exam:    General Appearance: alert,sitting up in bed. no acute distress   Skin: warm and dry  HEENT: oral mucosa normal, nonicteric sclera  Neck: supple, no JVD  Lungs: decreased bs bases .  Heart: RRR, normal S1 and S2  Abdomen: soft, nontender, nondistended. + bs  Extremities: no edema.  Neuro: normal speech and mental status     Scheduled Meds:     amLODIPine, 2.5 mg, Oral, Q24H  apixaban, 5 mg, Oral, Q12H  aspirin, 81 mg, Oral, Daily  dilTIAZem CD, 240 mg, Oral, Q24H  fluconazole, 200 mg, Oral, Once per day on Sun Tue Sat  folic acid, 1 mg, Oral, Daily  gabapentin, 100 mg, Oral, Daily With Lunch  Hydrocortisone (Perianal), , Rectal, BID  insulin glargine, 10 Units, Subcutaneous, Nightly  insulin lispro, 0-9 Units, Subcutaneous, 4x Daily With Meals & Nightly  levothyroxine, 250 mcg, Oral, Q AM  melatonin, 3 mg, Oral, Nightly  metoprolol tartrate, 100 mg, Oral, Q12H  pantoprazole, 40 mg, Oral, Daily  rOPINIRole, 1 mg, Oral, Nightly  sertraline, 150 mg, Oral, Daily      IV Meds:        Results Reviewed:   I have personally reviewed the results from the time of this admission to 10/4/2022 08:07 EDT      Results from last 7 days   Lab Units 10/03/22  0551 10/02/22  1036 10/01/22  0643   SODIUM mmol/L 130* 132* 130*   POTASSIUM mmol/L 5.5* 5.2 4.6   CHLORIDE mmol/L 95* 95* 93*   CO2 mmol/L 18.5* 22.7 23.6   BUN mg/dL 46* 36* 28*   CREATININE mg/dL 4.79* 4.11* 3.51*   CALCIUM mg/dL 8.1* 8.2* 8.2*   GLUCOSE mg/dL 235* 107* 250*       Estimated Creatinine Clearance: 12 mL/min (A) (by C-G formula based on SCr of 4.79 mg/dL (H)).    Results from last 7 days   Lab Units 10/03/22  0551 10/02/22  1036 10/01/22  0643 09/30/22  0613 09/29/22  0611 09/28/22  0536   MAGNESIUM mg/dL  --   --   --   --  1.9 1.8   PHOSPHORUS mg/dL 6.0* 5.4* 4.8*   < > 5.4* 4.9*    < > = values in this interval not displayed.             Results from last 7 days   Lab Units 10/04/22  0636 10/03/22  0551 10/02/22  0738 10/01/22  0643 09/30/22  0613   WBC 10*3/mm3 8.65 11.89* 9.22 9.51 10.41   HEMOGLOBIN g/dL 12.5 12.9 11.8* 12.6 11.7*   PLATELETS 10*3/mm3 185 234 158 172 195             Assessment / Plan     ASSESSMENT:    1. ESRD.  Dialysis MWF. Continue to remove volume .  Correct K and sodium .  Removing volume with HD, but weight not changing dramatically.   2. Left hydroureter,  SP cysto with left ureteral stent 9/29/22.   3. Dialysis catheter thrombus.  PFO.  Chronic AC on eliquis .  4. Acute on chronic systolic heart failure .   5. Hypothyroid on synthroid . TSH early September 99.  Recheck.   6. DM2  7. Uncontrolled HTN. Overall better .   8. Anemia ESRD  Above goal. Off ANDRAE.   9. Pyuria. Yeast on culture. On diflucan x 1 dose 10/3.  Completed ceftriaxone 10/2.   PLAN:  1. DC neurontin.   2. Dialysis tomorrow  3. Re-iterated need to work with PT  4. Po fluid restriction.      Ruth Lira MD  10/04/22  08:07 EDT    [facility]  [phone]

## 2022-10-04 NOTE — PROGRESS NOTES
"    Patient Name: Zaina Martinez  :1965  56 y.o.      Patient Care Team:  Jane Kyle NP as PCP - General (Family Medicine)  Adonis Fraga Jr., MD as Consulting Physician (Hematology and Oncology)    Chief Complaint: follow up atrial flutter    Interval History: called yesterday for elevated HR. Received extra dose of CCB and baseline dose was increased.        Objective   Vital Signs  Temp:  [97.4 °F (36.3 °C)-98.8 °F (37.1 °C)] 98.3 °F (36.8 °C)  Heart Rate:  [] 60  Resp:  [18] 18  BP: (121-152)/() 138/86    Intake/Output Summary (Last 24 hours) at 10/4/2022 1128  Last data filed at 10/4/2022 0900  Gross per 24 hour   Intake 1140 ml   Output 2000 ml   Net -860 ml     Flowsheet Rows    Flowsheet Row First Filed Value   Admission Height 157.5 cm (62\") Documented at 2022 2305   Admission Weight 59 kg (130 lb) Documented at 2022 2305          Physical Exam:   General Appearance:    asleep   Lungs:     Clear to auscultation.  Normal respiratory effort and rate.      Heart:    Regular rhythm and normal rate, normal S1 and S2, no murmurs, gallops or rubs.     Chest Wall:    No abnormalities observed   Abdomen:     Soft, nontender, positive bowel sounds.     Extremities:   no cyanosis, clubbing or edema.  No marked joint deformities.  Adequate musculoskeletal strength.       Results Review:    Results from last 7 days   Lab Units 10/04/22  0636   SODIUM mmol/L 132*   POTASSIUM mmol/L 4.4   CHLORIDE mmol/L 97*   CO2 mmol/L 20.1*   BUN mg/dL 23*   CREATININE mg/dL 3.01*   GLUCOSE mg/dL 129*   CALCIUM mg/dL 7.9*         Results from last 7 days   Lab Units 10/04/22  0636   WBC 10*3/mm3 8.65   HEMOGLOBIN g/dL 12.5   HEMATOCRIT % 38.9   PLATELETS 10*3/mm3 185         Results from last 7 days   Lab Units 22  0611   MAGNESIUM mg/dL 1.9                   Medication Review:   amLODIPine, 2.5 mg, Oral, Q24H  apixaban, 5 mg, Oral, Q12H  aspirin, 81 mg, Oral, Daily  dilTIAZem CD, 240 mg, Oral, " Q24H  folic acid, 1 mg, Oral, Daily  Hydrocortisone (Perianal), , Rectal, BID  insulin glargine, 10 Units, Subcutaneous, Nightly  insulin lispro, 0-9 Units, Subcutaneous, 4x Daily With Meals & Nightly  levothyroxine, 250 mcg, Oral, Q AM  melatonin, 3 mg, Oral, Nightly  metoprolol tartrate, 100 mg, Oral, Q12H  pantoprazole, 40 mg, Oral, Daily  rOPINIRole, 1 mg, Oral, Nightly  sertraline, 150 mg, Oral, Daily              Assessment & Plan     1.  Atrial flutter with RVR  -Rate controlled this morning following increased CCB dose. Continue increased dose. BP and HR stable.   - Suspect RVR was related to her physiologic stressors including volume overload, hypoxia and possible UTI, being worked up by primary team.  -Continue DOAC for stroke prevention [id number] markedly elevated at 6     2.  Small pericardial effusion  - Volume management per nephrology team.  No findings on exam to suggest pericarditis as cause     3.  Hypertension     4. Post op day 5 cystoscopy with left ureteral stent placement.     5. ESRD on HD    Stable with med changes as above. Will see as needed. Please call with questions.      RIKA Felipe  [facility]  10/04/22  11:28 EDT

## 2022-10-04 NOTE — PLAN OF CARE
Goal Outcome Evaluation:  Plan of Care Reviewed With: patient        Progress: improving  Outcome Evaluation: Pt demo's improvement in mobility this date. Able to perform STS with Ranulfo to RW and ambulates in room around bed and back with seated rest break on EOB. Pt only needing CGA for balance, but VC's for gait mechanics and safety with RW. Pt also demo's improvement in bed mobility. SpO2 >95% on RA at end of the session. Pt continues to benefit from skilled PT.

## 2022-10-04 NOTE — THERAPY TREATMENT NOTE
Patient Name: Zaina Martinez  : 1965    MRN: [id number]                              Today's Date: 10/4/2022       Admit Date: 2022    Visit Dx:     ICD-10-CM ICD-9-CM   1. Acute metabolic encephalopathy  G93.41 348.31   2. End stage renal disease (HCC)  N18.6 585.6   3. Acute pulmonary edema ([facility])  J81.0 518.4     Patient Active Problem List   Diagnosis   • Renal insufficiency   • Hypertensive disorder   • Hypothyroidism   • Type 2 diabetes mellitus with kidney complication, with long-term current use of insulin ([facility])   • Rheumatoid arthritis ([facility])   • Angioedema   • Esophageal dysmotility   • Anemia   • Medically noncompliant   • Myocardial infarction due to demand ischemia ([facility])   • Enteritis   • PRES (posterior reversible encephalopathy syndrome)   • Urine retention   • Klebsiella infection   • Superficial thrombophlebitis   • Generalized weakness   • ESRD (end stage renal disease) ([facility])   • CAD (coronary artery disease)   • Abnormal urinalysis   • Chronic diastolic CHF (congestive heart failure) ([facility])   • Pyelonephritis   • Calculus of gallbladder with acute on chronic cholecystitis without obstruction   • Pleural effusion on right   • Anemia due to chronic kidney disease, on chronic dialysis ([facility])   • Abnormal findings on diagnostic imaging of other specified body structures   • Acute upper respiratory infection   • Agitation   • Alkaline phosphatase raised   • Casts present in urine   • Cellulitis of toe   • Hip pain   • Community acquired pneumonia   • Depressive disorder   • Diarrhea of presumed infectious origin   • Difficult or painful urination   • Disease due to severe acute respiratory syndrome coronavirus 2 (SARS-CoV-2)   • Dyspnea   • Encounter for follow-up examination after completed treatment for conditions other than malignant neoplasm   • H/O: hypothyroidism   • Hyperlipidemia   • Hypomagnesemia   • Intractable vomiting with nausea   • Leukocytosis   • Luetscher's syndrome   • Need  for influenza vaccination   • Restless legs   • Noncompliance with treatment   • Shoulder pain   • Acute UTI (urinary tract infection)   • Metabolic encephalopathy   • Abnormal findings on diagnostic imaging of abdomen   • Status post cholecystectomy   • Hyponatremia   • Leukocytosis   • Acute metabolic encephalopathy   • Encephalopathy, toxic   • Acute CVA (cerebrovascular accident) (HCC)   • Intracranial hemorrhage (HCC)   • Stroke (HCC)   • Abnormal urinalysis   • Diabetic muscle infarction (HCC)   • Other insomnia   • Altered mental status   • History of stroke- R MCA s/p TPA with subsequent ICH with debility   • Heart murmur   • Bradycardia   • Left lower lobe pneumonia   • Metabolic encephalopathy   • Pericardial effusion   • Pleural effusion   • Atrial flutter (HCC)   • Chronic systolic CHF (congestive heart failure) (HCC)   • Thrombus of venous dialysis catheter (HCC)   • Sepsis without acute organ dysfunction (HCC)     Past Medical History:   Diagnosis Date   • Acute CVA (cerebrovascular accident) (HCC) 5/1/2022   • Acute on chronic diastolic CHF (congestive heart failure) (HCC)    • CAD (coronary artery disease) 12/20/2021   • Diabetes (HCC)    • Disease of thyroid gland    • GERD (gastroesophageal reflux disease)    • Hyperlipidemia 11/30/2018   • Hypertension    • Rheumatoid arthritis (HCC)      Past Surgical History:   Procedure Laterality Date   • CHOLECYSTECTOMY WITH INTRAOPERATIVE CHOLANGIOGRAM N/A 1/10/2022    Procedure: Laparoscopic cholecystectomy with intraoperative cholangiogram;  Surgeon: Ramana Raygoza MD;  Location: [facility];  Service: General;  Laterality: N/A;   • CYSTOSCOPY W/ URETERAL STENT PLACEMENT Left 9/29/2022    Procedure: CYSTOSCOPY URETERAL STENT INSERTION WITH RETROGRADE PYELOGRAM;  Surgeon: Bryant Phan Jr., MD;  Location: [facility];  Service: Urology;  Laterality: Left;   • EYE SURGERY     • HYSTERECTOMY     • INSERTION HEMODIALYSIS CATHETER N/A 12/6/2021     Procedure: HEMODIALYSIS CATHETER INSERTION;  Surgeon: Keli Salazar MD;  Location: [facility] OR 18/19;  Service: Vascular;  Laterality: N/A;   • INSERTION HEMODIALYSIS CATHETER N/A 5/3/2022    Procedure: TUNNELED CATHETER PLACEMENT;  Surgeon: Keli Salazar MD;  Location: [facility] OR;  Service: Vascular;  Laterality: N/A;   • MUSCLE BIOPSY Left 6/15/2022    Procedure: Left quadriceps muscle biopsy;  Surgeon: Marques Ma MD;  Location: [facility] OR;  Service: Neurosurgery;  Laterality: Left;      General Information     Row Name 10/04/22 1554          Physical Therapy Time and Intention    Document Type therapy note (daily note)  -DB     Mode of Treatment individual therapy;physical therapy  -DB     Row Name 10/04/22 1554          General Information    Patient Profile Reviewed yes  -DB     Existing Precautions/Restrictions fall  -DB           User Key  (r) = Recorded By, (t) = Taken By, (c) = Cosigned By    Initials Name Provider Type    DB Amparo Lara PT Physical Therapist               Mobility     Row Name 10/04/22 1554          Bed Mobility    Bed Mobility sit-supine  -DB     Sit-Supine Robards (Bed Mobility) contact guard;verbal cues  -DB     Assistive Device (Bed Mobility) bed rails  -DB     Row Name 10/04/22 1554          Sit-Stand Transfer    Sit-Stand Robards (Transfers) minimum assist (75% patient effort);verbal cues;contact guard  -DB     Assistive Device (Sit-Stand Transfers) walker, front-wheeled  -DB     Row Name 10/04/22 1554          Gait/Stairs (Locomotion)    Robards Level (Gait) contact guard  -DB     Assistive Device (Gait) walker, front-wheeled  -DB     Distance in Feet (Gait) 15' x 2 with seated rest break  -DB     Deviations/Abnormal Patterns (Gait) gait speed decreased;kenn decreased;stride length decreased;base of support, narrow;weight shifting decreased  -DB     Bilateral Gait Deviations forward flexed posture;heel strike decreased  -DB            User Key  (r) = Recorded By, (t) = Taken By, (c) = Cosigned By    Initials Name Provider Type    Amparo Monreal PT Physical Therapist               Obj/Interventions     Row Name 10/04/22 1556          Balance    Balance Assessment sitting static balance;sitting dynamic balance;standing static balance;standing dynamic balance  -DB     Static Sitting Balance standby assist  -DB     Dynamic Sitting Balance standby assist  -DB     Position, Sitting Balance unsupported;sitting edge of bed  -DB     Static Standing Balance contact guard  -DB     Dynamic Standing Balance contact guard  -DB     Position/Device Used, Standing Balance supported;walker, front-wheeled  -DB     Balance Interventions sitting;standing;sit to stand  -DB           User Key  (r) = Recorded By, (t) = Taken By, (c) = Cosigned By    Initials Name Provider Type    Amparo Monreal PT Physical Therapist               Goals/Plan    No documentation.                Clinical Impression     Row Name 10/04/22 1556          Pain    Pain Intervention(s) Ambulation/increased activity;Repositioned  -DB     Row Name 10/04/22 1556          Plan of Care Review    Plan of Care Reviewed With patient  -DB     Progress improving  -DB     Outcome Evaluation Pt demo's improvement in mobility this date. Able to perform STS with Ranulfo to RW and ambulates in room around bed and back with seated rest break on EOB. Pt only needing CGA for balance, but VC's for gait mechanics and safety with RW. Pt also demo's improvement in bed mobility. SpO2 >95% on RA at end of the session. Pt continues to benefit from skilled PT.  -DB     Row Name 10/04/22 1551          Vital Signs    O2 Delivery Pre Treatment room air  -DB     O2 Delivery Intra Treatment room air  -DB     O2 Delivery Post Treatment room air  -DB     Pre Patient Position Sitting  -DB     Intra Patient Position Standing  -DB     Post Patient Position Supine  -DB     Row Name 10/04/22 1557           Positioning and Restraints    Pre-Treatment Position sitting in chair/recliner  -DB     Post Treatment Position bed  -DB     In Bed supine;call light within reach;encouraged to call for assist;exit alarm on  -DB           User Key  (r) = Recorded By, (t) = Taken By, (c) = Cosigned By    Initials Name Provider Type    Amparo Monreal, VICKIE Physical Therapist               Outcome Measures     Row Name 10/04/22 1620 10/04/22 0816       How much help from another person do you currently need...    Turning from your back to your side while in flat bed without using bedrails? 3  -DB 2  -MS    Moving from lying on back to sitting on the side of a flat bed without bedrails? 3  -DB 2  -MS    Moving to and from a bed to a chair (including a wheelchair)? 3  -DB 2  -MS    Standing up from a chair using your arms (e.g., wheelchair, bedside chair)? 3  -DB 2  -MS    Climbing 3-5 steps with a railing? 2  -DB 1  -MS    To walk in hospital room? 3  -DB 2  -MS    AM-PAC 6 Clicks Score (PT) 17  -DB 11  -MS    Highest level of mobility 5 --> Static standing  -DB 4 --> Transferred to chair/commode  -MS    Row Name 10/04/22 1620          Functional Assessment    Outcome Measure Options AM-PAC 6 Clicks Basic Mobility (PT)  -DB           User Key  (r) = Recorded By, (t) = Taken By, (c) = Cosigned By    Initials Name Provider Type    Frederic Sosa, RN Registered Nurse    Amparo Monreal, PT Physical Therapist                             Physical Therapy Education                 Title: PT OT SLP Therapies (Done)     Topic: Physical Therapy (Done)     Point: Mobility training (Done)     Learning Progress Summary           Patient Acceptance, E, VU by DB at 10/4/2022 1620    Acceptance, E,TB, VU,DU by CS at 9/29/2022 1540    Acceptance, E, VU by MG at 9/28/2022 1109                   Point: Home exercise program (Done)     Learning Progress Summary           Patient Acceptance, E, VU by DB at 10/4/2022 1620                    Point: Body mechanics (Done)     Learning Progress Summary           Patient Acceptance, E, VU by DB at 10/4/2022 1620    Acceptance, E,TB, VU,DU by CS at 9/29/2022 1540    Acceptance, E, VU by MG at 9/28/2022 1109                   Point: Precautions (Done)     Learning Progress Summary           Patient Acceptance, E, VU by DB at 10/4/2022 1620    Acceptance, E,TB, VU,DU by CS at 9/29/2022 1540    Acceptance, E, VU by MG at 9/28/2022 1109                               User Key     Initials Effective Dates Name Provider Type Discipline    MG 05/24/22 -  Gisella Cabrales, PT Physical Therapist PT    DB 06/16/21 -  Amparo Lara, PT Physical Therapist PT    CS 09/22/22 -  Chelo De La Garza, PT Physical Therapist PT              PT Recommendation and Plan     Plan of Care Reviewed With: patient  Progress: improving  Outcome Evaluation: Pt demo's improvement in mobility this date. Able to perform STS with Ranulfo to RW and ambulates in room around bed and back with seated rest break on EOB. Pt only needing CGA for balance, but VC's for gait mechanics and safety with RW. Pt also demo's improvement in bed mobility. SpO2 >95% on RA at end of the session. Pt continues to benefit from skilled PT.     Time Calculation:    PT Charges     Row Name 10/04/22 1621             Time Calculation    Start Time 1450  -DB      Stop Time 1504  -DB      Time Calculation (min) 14 min  -DB      PT Received On 10/04/22  -DB      PT - Next Appointment 10/05/22  -DB              Time Calculation- PT    Total Timed Code Minutes- PT 14 minute(s)  -DB            User Key  (r) = Recorded By, (t) = Taken By, (c) = Cosigned By    Initials Name Provider Type    DB Amparo Lara, PT Physical Therapist              Therapy Charges for Today     Code Description Service Date Service Provider Modifiers Qty    70911916978 HC PT THERAPEUTIC ACT EA 15 MIN 10/4/2022 Amparo Lara PT GP 1          PT G-Codes  Outcome Measure Options: AM-PAC 6 Clicks Basic  Mobility (PT)  AM-PAC 6 Clicks Score (PT): 17  AM-PAC 6 Clicks Score (OT): 19    Amparo Lara, PT  10/4/2022

## 2022-10-04 NOTE — CASE MANAGEMENT/SOCIAL WORK
Continued Stay Note  [facility]     Patient Name: Zaina Martinez  MRN: [id number]  Today's Date: 10/4/2022    Admit Date: 9/26/2022    Plan: Plan home with family and [facility].  AVINASH Robles RN   Discharge Plan     Row Name 10/04/22 1427       Plan    Plan Plan home with family and [facility].  AVINASH Robles RN    Patient/Family in Agreement with Plan yes    Plan Comments Spoke with pt at bedside.  Pt states her plan is home with family support.  Per Anna ([phone])  Tech will change out pt's wheelchair once she is home.   Pt is current with [facility].  Viola  [phone]) is following for [facility].   Plan home with family and [facility].  AVINASH Robles RN               Discharge Codes    No documentation.               Expected Discharge Date and Time     Expected Discharge Date Expected Discharge Time    Oct 8, 2022             Daly Robles RN

## 2022-10-04 NOTE — PLAN OF CARE
Problem: Adult Inpatient Plan of Care  Goal: Plan of Care Review  Outcome: Ongoing, Progressing  Flowsheets (Taken 10/4/2022 1430)  Progress: improving  Plan of Care Reviewed With: patient  Outcome Evaluation: Patient has been pleasant and cooperative during shift. Nephro requesting standing weights and strict I/O. Dicontinued neurontin. Hemodialysis orders called for 10/5. Heart rate controlled. AOx4, assist x1, room air. No complaints of pain, nausea or SOA. Will continue to monitor and assist patient as needed.  Goal: Patient-Specific Goal (Individualized)  Outcome: Ongoing, Progressing  Goal: Absence of Hospital-Acquired Illness or Injury  Outcome: Ongoing, Progressing  Intervention: Identify and Manage Fall Risk  Recent Flowsheet Documentation  Taken 10/4/2022 1330 by Frederic Love RN  Safety Promotion/Fall Prevention:   assistive device/personal items within reach   fall prevention program maintained   nonskid shoes/slippers when out of bed   safety round/check completed  Taken 10/4/2022 1200 by Frederic Love RN  Safety Promotion/Fall Prevention:   assistive device/personal items within reach   fall prevention program maintained   nonskid shoes/slippers when out of bed   safety round/check completed  Taken 10/4/2022 1000 by Frederic Love RN  Safety Promotion/Fall Prevention:   assistive device/personal items within reach   fall prevention program maintained   nonskid shoes/slippers when out of bed   safety round/check completed  Taken 10/4/2022 0816 by Frederic Love RN  Safety Promotion/Fall Prevention:   assistive device/personal items within reach   fall prevention program maintained   nonskid shoes/slippers when out of bed   safety round/check completed  Intervention: Prevent Skin Injury  Recent Flowsheet Documentation  Taken 10/4/2022 1330 by Frederic Love RN  Body Position: dangle, side of bed  Taken 10/4/2022 1200 by Frederic Love RN  Body Position: supine  Taken 10/4/2022 1000  by Schanie, Frederic, RN  Body Position:   left   position changed independently   tilted  Taken 10/4/2022 0816 by Frederic Love RN  Body Position: supine  Intervention: Prevent and Manage VTE (Venous Thromboembolism) Risk  Recent Flowsheet Documentation  Taken 10/4/2022 1330 by Frederic Love RN  Activity Management:   activity adjusted per tolerance   sitting, edge of bed  Taken 10/4/2022 1200 by Frederic Love RN  Activity Management: activity adjusted per tolerance  Taken 10/4/2022 1000 by Frederic Love RN  Activity Management: activity adjusted per tolerance  Taken 10/4/2022 0816 by Frederic Love RN  Activity Management: activity adjusted per tolerance  Goal: Optimal Comfort and Wellbeing  Outcome: Ongoing, Progressing  Goal: Readiness for Transition of Care  Outcome: Ongoing, Progressing     Problem: Fall Injury Risk  Goal: Absence of Fall and Fall-Related Injury  Outcome: Ongoing, Progressing  Intervention: Promote Injury-Free Environment  Recent Flowsheet Documentation  Taken 10/4/2022 1330 by Frederic Love RN  Safety Promotion/Fall Prevention:   assistive device/personal items within reach   fall prevention program maintained   nonskid shoes/slippers when out of bed   safety round/check completed  Taken 10/4/2022 1200 by Frederic Love RN  Safety Promotion/Fall Prevention:   assistive device/personal items within reach   fall prevention program maintained   nonskid shoes/slippers when out of bed   safety round/check completed  Taken 10/4/2022 1000 by Frederic Love RN  Safety Promotion/Fall Prevention:   assistive device/personal items within reach   fall prevention program maintained   nonskid shoes/slippers when out of bed   safety round/check completed  Taken 10/4/2022 0816 by Frederic Love RN  Safety Promotion/Fall Prevention:   assistive device/personal items within reach   fall prevention program maintained   nonskid shoes/slippers when out of bed   safety round/check  completed     Problem: UTI (Urinary Tract Infection)  Goal: Improved Infection Symptoms  Outcome: Ongoing, Progressing     Problem: Device-Related Complication Risk (Hemodialysis)  Goal: Safe, Effective Therapy Delivery  Outcome: Ongoing, Progressing     Problem: Hemodynamic Instability (Hemodialysis)  Goal: Effective Tissue Perfusion  Outcome: Ongoing, Progressing     Problem: Infection (Hemodialysis)  Goal: Absence of Infection Signs and Symptoms  Outcome: Ongoing, Progressing     Problem: Dysrhythmia  Goal: Normalized Cardiac Rhythm  Outcome: Ongoing, Progressing   Goal Outcome Evaluation:  Plan of Care Reviewed With: patient        Progress: improving  Outcome Evaluation: Patient has been pleasant and cooperative during shift. Nephro requesting standing weights and strict I/O. Dicontinued neurontin. Hemodialysis orders called for 10/5. Heart rate controlled. AOx4, assist x1, room air. No complaints of pain, nausea or SOA. Will continue to monitor and assist patient as needed.

## 2022-10-04 NOTE — PLAN OF CARE
Goal Outcome Evaluation:  Plan of Care Reviewed With: patient        Progress: improving  Outcome Evaluation: No complaints voiced, denies pain, VSS, Aflutter on monitor, eyes closed at long interval, resting quietly in room, will continue to monitor

## 2022-10-05 LAB
ALBUMIN SERPL-MCNC: 3 G/DL (ref 3.5–5.2)
ANION GAP SERPL CALCULATED.3IONS-SCNC: 9 MMOL/L (ref 5–15)
BASOPHILS # BLD AUTO: 0.07 10*3/MM3 (ref 0–0.2)
BASOPHILS NFR BLD AUTO: 0.7 % (ref 0–1.5)
BUN SERPL-MCNC: 34 MG/DL (ref 6–20)
BUN/CREAT SERPL: 8.6 (ref 7–25)
CALCIUM SPEC-SCNC: 7.7 MG/DL (ref 8.6–10.5)
CHLORIDE SERPL-SCNC: 98 MMOL/L (ref 98–107)
CO2 SERPL-SCNC: 24 MMOL/L (ref 22–29)
CREAT SERPL-MCNC: 3.96 MG/DL (ref 0.57–1)
DEPRECATED RDW RBC AUTO: 50.5 FL (ref 37–54)
EGFRCR SERPLBLD CKD-EPI 2021: 12.7 ML/MIN/1.73
EOSINOPHIL # BLD AUTO: 0.1 10*3/MM3 (ref 0–0.4)
EOSINOPHIL NFR BLD AUTO: 1 % (ref 0.3–6.2)
ERYTHROCYTE [DISTWIDTH] IN BLOOD BY AUTOMATED COUNT: 15.9 % (ref 12.3–15.4)
GLUCOSE BLDC GLUCOMTR-MCNC: 132 MG/DL (ref 70–130)
GLUCOSE BLDC GLUCOMTR-MCNC: 134 MG/DL (ref 70–130)
GLUCOSE BLDC GLUCOMTR-MCNC: 143 MG/DL (ref 70–130)
GLUCOSE BLDC GLUCOMTR-MCNC: 71 MG/DL (ref 70–130)
GLUCOSE SERPL-MCNC: 98 MG/DL (ref 65–99)
HCT VFR BLD AUTO: 38.9 % (ref 34–46.6)
HGB BLD-MCNC: 11.6 G/DL (ref 12–15.9)
IMM GRANULOCYTES # BLD AUTO: 0.05 10*3/MM3 (ref 0–0.05)
IMM GRANULOCYTES NFR BLD AUTO: 0.5 % (ref 0–0.5)
LYMPHOCYTES # BLD AUTO: 1.75 10*3/MM3 (ref 0.7–3.1)
LYMPHOCYTES NFR BLD AUTO: 17.4 % (ref 19.6–45.3)
MCH RBC QN AUTO: 26.2 PG (ref 26.6–33)
MCHC RBC AUTO-ENTMCNC: 29.8 G/DL (ref 31.5–35.7)
MCV RBC AUTO: 87.8 FL (ref 79–97)
MONOCYTES # BLD AUTO: 0.93 10*3/MM3 (ref 0.1–0.9)
MONOCYTES NFR BLD AUTO: 9.3 % (ref 5–12)
NEUTROPHILS NFR BLD AUTO: 7.14 10*3/MM3 (ref 1.7–7)
NEUTROPHILS NFR BLD AUTO: 71.1 % (ref 42.7–76)
NRBC BLD AUTO-RTO: 0 /100 WBC (ref 0–0.2)
PHOSPHATE SERPL-MCNC: 4.5 MG/DL (ref 2.5–4.5)
PLATELET # BLD AUTO: 202 10*3/MM3 (ref 140–450)
PMV BLD AUTO: 10.4 FL (ref 6–12)
POTASSIUM SERPL-SCNC: 4.6 MMOL/L (ref 3.5–5.2)
RBC # BLD AUTO: 4.43 10*6/MM3 (ref 3.77–5.28)
SODIUM SERPL-SCNC: 131 MMOL/L (ref 136–145)
WBC NRBC COR # BLD: 10.04 10*3/MM3 (ref 3.4–10.8)

## 2022-10-05 PROCEDURE — 80069 RENAL FUNCTION PANEL: CPT | Performed by: INTERNAL MEDICINE

## 2022-10-05 PROCEDURE — 25010000002 HEPARIN (PORCINE) PER 1000 UNITS: Performed by: UROLOGY

## 2022-10-05 PROCEDURE — 85025 COMPLETE CBC W/AUTO DIFF WBC: CPT | Performed by: UROLOGY

## 2022-10-05 PROCEDURE — 82962 GLUCOSE BLOOD TEST: CPT

## 2022-10-05 RX ADMIN — DILTIAZEM HYDROCHLORIDE 240 MG: 240 CAPSULE, COATED, EXTENDED RELEASE ORAL at 08:22

## 2022-10-05 RX ADMIN — INSULIN GLARGINE-YFGN 5 UNITS: 100 INJECTION, SOLUTION SUBCUTANEOUS at 21:59

## 2022-10-05 RX ADMIN — Medication 1 MG: at 08:22

## 2022-10-05 RX ADMIN — HYDROCORTISONE: 25 CREAM TOPICAL at 22:01

## 2022-10-05 RX ADMIN — ASPIRIN 81 MG: 81 TABLET, COATED ORAL at 08:22

## 2022-10-05 RX ADMIN — APIXABAN 5 MG: 5 TABLET, FILM COATED ORAL at 21:59

## 2022-10-05 RX ADMIN — AMLODIPINE BESYLATE 2.5 MG: 10 TABLET ORAL at 08:22

## 2022-10-05 RX ADMIN — Medication 3 MG: at 21:59

## 2022-10-05 RX ADMIN — ACETAMINOPHEN 650 MG: 325 TABLET, FILM COATED ORAL at 10:07

## 2022-10-05 RX ADMIN — METOPROLOL TARTRATE 100 MG: 50 TABLET, FILM COATED ORAL at 08:22

## 2022-10-05 RX ADMIN — PANTOPRAZOLE SODIUM 40 MG: 40 TABLET, DELAYED RELEASE ORAL at 08:22

## 2022-10-05 RX ADMIN — HEPARIN SODIUM 3800 UNITS: 1000 INJECTION INTRAVENOUS; SUBCUTANEOUS at 15:41

## 2022-10-05 RX ADMIN — LEVOTHYROXINE SODIUM 250 MCG: 0.12 TABLET ORAL at 06:08

## 2022-10-05 RX ADMIN — SERTRALINE 150 MG: 100 TABLET, FILM COATED ORAL at 08:22

## 2022-10-05 RX ADMIN — HYDROCORTISONE: 25 CREAM TOPICAL at 08:23

## 2022-10-05 RX ADMIN — ROPINIROLE 1 MG: 1 TABLET, FILM COATED ORAL at 21:59

## 2022-10-05 RX ADMIN — APIXABAN 5 MG: 5 TABLET, FILM COATED ORAL at 08:22

## 2022-10-05 NOTE — SIGNIFICANT NOTE
10/05/22 1552   OTHER   Discipline physical therapy assistant   Rehab Time/Intention   Session Not Performed patient unavailable for treatment  (pt off floor for dialysis; PT will f/u tomorrow)   Recommendation   PT - Next Appointment 10/06/22

## 2022-10-05 NOTE — PROGRESS NOTES
[facility] Progress Note      Patient Name: Zaina Martinez  : 1965  MRN: [id number]  Primary Care Physician:  Jane Kyle NP  Date of admission: 2022    Subjective     Interval History:   Follow up ESRD. Not sleeping at night.  Complains of constant right chest pain that has been going on for a long time. Not worse with movement or inspiration.  No cough . Not soa .     Review of Systems:   As noted above    Objective     Vitals:   Temp:  [97.6 °F (36.4 °C)-98.4 °F (36.9 °C)] 98.4 °F (36.9 °C)  Heart Rate:  [57-61] 57  Resp:  [18] 18  BP: (126-141)/(79-85) 141/85    Intake/Output Summary (Last 24 hours) at 10/5/2022 1324  Last data filed at 10/5/2022 0800  Gross per 24 hour   Intake 600 ml   Output --   Net 600 ml       Physical Exam:    General Appearance: alert,sitting up in bed. no acute distress   Skin: warm and dry  HEENT: oral mucosa normal, nonicteric sclera  Neck: RIJ tdc nontender tunnel or exit site.  Chest wall not tender to touch .  Lungs: decreased bs bases .    Heart: RRR, normal S1 and S2  Abdomen: soft, nontender, nondistended. + bs  Extremities: no edema.  Neuro: normal speech and mental status     Scheduled Meds:     amLODIPine, 2.5 mg, Oral, Q24H  apixaban, 5 mg, Oral, Q12H  aspirin, 81 mg, Oral, Daily  dilTIAZem CD, 240 mg, Oral, Q24H  folic acid, 1 mg, Oral, Daily  Hydrocortisone (Perianal), , Rectal, BID  insulin glargine, 5 Units, Subcutaneous, Nightly  insulin lispro, 0-9 Units, Subcutaneous, 4x Daily With Meals & Nightly  levothyroxine, 250 mcg, Oral, Q AM  melatonin, 3 mg, Oral, Nightly  metoprolol tartrate, 100 mg, Oral, Q12H  pantoprazole, 40 mg, Oral, Daily  rOPINIRole, 1 mg, Oral, Nightly  sertraline, 150 mg, Oral, Daily      IV Meds:        Results Reviewed:   I have personally reviewed the results from the time of this admission to 10/5/2022 13:24 EDT     Results from last 7 days   Lab Units 10/05/22  0647 10/04/22  0636 10/03/22  0551    SODIUM mmol/L 131* 132* 130*   POTASSIUM mmol/L 4.6 4.4 5.5*   CHLORIDE mmol/L 98 97* 95*   CO2 mmol/L 24.0 20.1* 18.5*   BUN mg/dL 34* 23* 46*   CREATININE mg/dL 3.96* 3.01* 4.79*   CALCIUM mg/dL 7.7* 7.9* 8.1*   GLUCOSE mg/dL 98 129* 235*       Estimated Creatinine Clearance: 14.3 mL/min (A) (by C-G formula based on SCr of 3.96 mg/dL (H)).    Results from last 7 days   Lab Units 10/05/22  0647 10/04/22  0636 10/03/22  0551 09/30/22  0613 09/29/22  0611   MAGNESIUM mg/dL  --   --   --   --  1.9   PHOSPHORUS mg/dL 4.5 3.8 6.0*   < > 5.4*    < > = values in this interval not displayed.             Results from last 7 days   Lab Units 10/05/22  0647 10/04/22  0636 10/03/22  0551 10/02/22  0738 10/01/22  0643   WBC 10*3/mm3 10.04 8.65 11.89* 9.22 9.51   HEMOGLOBIN g/dL 11.6* 12.5 12.9 11.8* 12.6   PLATELETS 10*3/mm3 202 185 234 158 172             Assessment / Plan     ASSESSMENT:    1. ESRD.  Dialysis MWF. Continue to remove volume .  Correct K and sodium .  Removing volume with HD, but weight not changing dramatically.   2. Left hydroureter,  SP cysto with left ureteral stent 9/29/22.   3. Dialysis catheter thrombus.  PFO.  Chronic AC on eliquis .  4. Chronic systolic heart failure . Compensated .  5. Hypothyroid on synthroid . TSH early September 99.  Recheck better at 44.8.    6. DM2  7. HTN. Overall better .   8. Anemia ESRD  Above goal. Off ANDRAE.   9. Pyuria. Yeast on culture. On diflucan x 1 dose 10/3.  Completed ceftriaxone 10/2.   PLAN:  1. Dialysis today      Ruth Lira MD  10/05/22  13:24 EDT    [facility]  [phone]

## 2022-10-05 NOTE — NURSING NOTE
HD WITHOUT INCIDENT OR C/O. TOLERATED WELL. REMOVED 2.5 L AS ORDERED. NO MEDS ADMINISTERED. RAUL CURRENT, CDI. STABLE, NO C/O UPON COMPLETION OF HD.

## 2022-10-05 NOTE — PROGRESS NOTES
Name: Zaina Martinez ADMIT: 2022   : 1965  PCP: Jane Kyle NP    MRN: [id number] LOS: 8 days   AGE/SEX: 56 y.o. female  ROOM: [facility] [place]     Subjective   Subjective   Patient seen and examined this afternoon. Hospital day 9.  POD #6 (10/05/2022) s/p cystoscopy with left ureteral stent insertion, no acute events overnight, no acute distress. Patient is awake, alert, oriented x3. On room air with O2 sat greater than 90%, stable from prior examination.    Review of Systems   Constitutional: Negative for chills and fever.   Respiratory: Negative for cough and shortness of breath.    Cardiovascular: Negative for chest pain and palpitations.   Gastrointestinal: Negative for abdominal pain, constipation and diarrhea.   Genitourinary: Negative for dysuria and hematuria.   Neurological: Negative for dizziness and light-headedness.        Objective   Objective   Vital Signs  Temp:  [97.6 °F (36.4 °C)-98.1 °F (36.7 °C)] 97.9 °F (36.6 °C)  Heart Rate:  [59-61] 60  Resp:  [18] 18  BP: (126-134)/(78-80) 126/80  SpO2:  [94 %-99 %] 94 %  on   ;   Device (Oxygen Therapy): room air  Body mass index is 23.02 kg/m².  Physical Exam  Vitals and nursing note reviewed.   Constitutional:       General: She is awake.      Appearance: She is ill-appearing.   HENT:      Head: Atraumatic.   Eyes:      General: No visual field deficit or scleral icterus.     Conjunctiva/sclera: Conjunctivae normal.      Pupils: Pupils are equal, round, and reactive to light.   Cardiovascular:      Rate and Rhythm: Normal rate and regular rhythm.      Pulses: Normal pulses.      Heart sounds: Normal heart sounds.   Pulmonary:      Effort: Pulmonary effort is normal. No accessory muscle usage or respiratory distress.      Comments: On room air  Abdominal:      General: Bowel sounds are normal.      Palpations: Abdomen is soft.      Tenderness: There is no abdominal tenderness.   Skin:     General: Skin is warm and dry.   Neurological:      General:  No focal deficit present.      Mental Status: She is alert and oriented to person, place, and time.      Cranial Nerves: No facial asymmetry.   Psychiatric:         Behavior: Behavior is cooperative.       Results Review     I reviewed the patient's new clinical results.  Leukocytosis resolved, WBC 10.04.  CBC otherwise stable.  Sodium low at 131, stable  from prior.  Potassium 4.6, glucose <140.  Results from last 7 days   Lab Units 10/05/22  0647 10/04/22  0636 10/03/22  0551 10/02/22  0738   WBC 10*3/mm3 10.04 8.65 11.89* 9.22   HEMOGLOBIN g/dL 11.6* 12.5 12.9 11.8*   PLATELETS 10*3/mm3 202 185 234 158     Results from last 7 days   Lab Units 10/05/22  0647 10/04/22  0636 10/03/22  0551 10/02/22  1036   SODIUM mmol/L 131* 132* 130* 132*   POTASSIUM mmol/L 4.6 4.4 5.5* 5.2   CHLORIDE mmol/L 98 97* 95* 95*   CO2 mmol/L 24.0 20.1* 18.5* 22.7   BUN mg/dL 34* 23* 46* 36*   CREATININE mg/dL 3.96* 3.01* 4.79* 4.11*   GLUCOSE mg/dL 98 129* 235* 107*   EGFR mL/min/1.73 12.7* 17.7* 10.1* 12.2*     Results from last 7 days   Lab Units 10/05/22  0647 10/04/22  0636 10/03/22  0551 10/02/22  1036   ALBUMIN g/dL 3.00* 2.70* 3.00* 3.20*     Results from last 7 days   Lab Units 10/05/22  0647 10/04/22  0636 10/03/22  0551 10/02/22  1036 09/30/22  0613 09/29/22  0611   CALCIUM mg/dL 7.7* 7.9* 8.1* 8.2*   < > 7.6*   ALBUMIN g/dL 3.00* 2.70* 3.00* 3.20*   < > 3.00*   MAGNESIUM mg/dL  --   --   --   --   --  1.9   PHOSPHORUS mg/dL 4.5 3.8 6.0* 5.4*   < > 5.4*    < > = values in this interval not displayed.       Glucose   Date/Time Value Ref Range Status   10/05/2022 0546 71 70 - 130 mg/dL Final     Comment:     Meter: [id number] : [id number] Ronnie Blanc NA   10/04/2022 2103 165 (H) 70 - 130 mg/dL Final     Comment:     Meter: [id number] : [id number] Ronnie Blanc    10/04/2022 1536 260 (H) 70 - 130 mg/dL Final     Comment:     Meter: [id number] : [id number] [facility]   10/04/2022 1100 203 (H) 70 - 130 mg/dL Final      Comment:     Meter: [id number] : [id number] Rex Donovan NA   10/04/2022 0614 128 70 - 130 mg/dL Final     Comment:     Meter: [id number] : [id number] Tawnya Townsend NA   10/03/2022 2014 183 (H) 70 - 130 mg/dL Final     Comment:     Meter: [id number] : [id number] Tawnya Townsend NA   10/03/2022 1532 226 (H) 70 - 130 mg/dL Final     Comment:     Meter: [id number] : [id number] Ej SAAVEDRA       No radiology results for the last day  Telemetry personally reviewed by me this morning: Atrial flutter, rate controlled  Micro results reviewed by me: Blood cultures NGTD, urine culture with isolated yeast.    Scheduled Medications  amLODIPine, 2.5 mg, Oral, Q24H  apixaban, 5 mg, Oral, Q12H  aspirin, 81 mg, Oral, Daily  dilTIAZem CD, 240 mg, Oral, Q24H  folic acid, 1 mg, Oral, Daily  Hydrocortisone (Perianal), , Rectal, BID  insulin glargine, 10 Units, Subcutaneous, Nightly  insulin lispro, 0-9 Units, Subcutaneous, 4x Daily With Meals & Nightly  levothyroxine, 250 mcg, Oral, Q AM  melatonin, 3 mg, Oral, Nightly  metoprolol tartrate, 100 mg, Oral, Q12H  pantoprazole, 40 mg, Oral, Daily  rOPINIRole, 1 mg, Oral, Nightly  sertraline, 150 mg, Oral, Daily    Infusions   Diet  Diet Regular; Cardiac, Daily Fluid Restriction; Other; 1,200       Assessment/Plan     Active Hospital Problems    Diagnosis  POA   • **Acute UTI (urinary tract infection) [N39.0]  Yes   • Sepsis without acute organ dysfunction (HCC) [A41.9]  Yes   • Chronic systolic CHF (congestive heart failure) ([facility]) [I50.22]  Yes   • Atrial flutter ([facility]) [I48.92]  Yes   • History of stroke- R MCA s/p TPA with subsequent ICH with debility [Z86.73]  Not Applicable   • Acute metabolic encephalopathy [G93.41]  Yes   • Anemia due to chronic kidney disease, on chronic dialysis ([facility]) [N18.6, D63.1, Z99.2]  Not Applicable   • ESRD (end stage renal disease) (HCC) [N18.6]  Yes   • CAD (coronary artery disease) [I25.10]  Yes   • Hypothyroidism [E03.9]   Yes   • Rheumatoid arthritis (HCC) [M06.9]  Yes   • Hyperlipidemia [E78.5]  Yes   • Type 2 diabetes mellitus with kidney complication, with long-term current use of insulin (HCC) [E11.29, Z79.4]  Not Applicable      Resolved Hospital Problems   No resolved problems to display.       56 y.o. female with history of HFrEF, ESRD on HD M/W/F, type 2 diabetes mellitus, CAD, atrial flutter, hypertension, hyperlipidemia, rheumatoid arthritis, hypothyroidism and anemia of end-stage renal disease who presents to [facility] with reported confusion, nausea and vomiting that began yesterday prior to arrival.  Subsequently admitted for acute on chronic HFrEF and sepsis secondary to acute urinary tract infection.    Sepsis secondary to acute urinary tract infection: [Resolved] Completed treatment with ceftriaxone (10/02). Urine culture growing yeast-given that she had urinary tract manipulation, decision made to add anti-fungal treatment. Blood cultures NGTD.  Completed treatment with fluconazole (10/04). Appears stable, no evidence to suggest recurrence. Continue to monitor.    Abnormal findings on CT Abdomen/Pelvis: Urology consulted, patient subsequently underwent cystoscopy with left ureteral stent insertion on 09/29.    - Per Urology, she needs outpatient follow-up with [facility], [phone], Dr. Phan in 1-2 weeks.  Appreciate their help.    Acute hypoxic respiratory failure secondary to Acute on chronic systolic heart failure: [Resolved] Acute respiratory failure now resolved, and is patient currently on room air, appears stable without evidence of acute respiratory distress.   - HD for volume removal. Nephrology and Cardiology consulted and following. Will continue to follow their plans/recommendations. Greatly appreciate their help.   - supplemental O2 PRN, continue pulse oximetry, telemetry, strict I/O, daily weights, low sodium diet.    Type 2 diabetes mellitus with insulin dependence with  hyperglycemia: Currently on correctional SSI + Lantus 10 units nightly (added 10/01).  Blood glucose better controlled following increase in Lantus, now within target inpatient range mostly, however, was <140 on most recent checks. Want to avoid hypoglycemia. Needs adjustment.  - Decrease Lantus to 5 units QHS. Continue correctional SSI. Can add mealtime insulin if needed.  -  Will monitor 24 hour insulin requirements and adjust as needed to achieve target inpatient range of 140-180. Continue Gabapentin for neuropathy.    Atrial flutter with RVR: Cardiology following.  Rate uncontrolled on 10/03, cardiology subsequently increased diltiazem. Now, rate controlled (10/04). On Diltiazem and Metoprolol as currently prescribed plus home Eliquis.   - Continue Diltiazem and Metoprolol as currently prescribed plus home Eliquis per cardiology, continue to follow their plans/recommendations, appreciate their help.  - Continue telemetry monitoring.    End-stage renal disease: On HD M/W/F as outpatient.  Nephrology consulted and following.  Defer management to them.    Hyponatremia: Na 130. Nephrology consulted for management.  Currently on fluid restriction.  Will continue to follow their plans/recommendations. Greatly appreciate their help.    Hypertension: BP acceptable acutely. Continue current regimen as prescribed. Closely monitor to guide further management.    Anemia of end-stage renal disease: Hemoglobin stable. No indications to warrant acute intervention. Continue EPO as prescribed. Trend hemoglobin on CBC. No indications for acute intervention at this time.  Continue to monitor, transfuse for hemoglobin <7.    Hypothyroidism: Stable.  Continue home levothyroxine as prescribed.    Transient alteration in awareness:   - [Resolved]  Noted  (10/03), likely a result of insufficient sleep and pain medication. Neurontin discontinued. Prescribed melatonin for sleep. Now resolved.   - Delirium precautions: Evidence-based  delirium precautions include: Frequent reorientation, reminding patient not questioning patient, Shades up during day/down at night, TV off except for soft music only, Familiar objects at bedside, Encourage friends/family to spend the night, Minimize anticholingeric medications ,Minimize polypharmacy, Minimize restraints, including lines and/or foleys and/or feeding tubes as able, Minimize overnight checks/vitals to encourage restful consistent sleep patterns, Out of bed during day as much as possible       · Eliquis (home med) for DVT prophylaxis.  · Full code.  · Discussed with patient and nursing staff.  · Anticipate discharge Home with family and [facility] HH when cleared by consultants.      Jimmy Armenta DO  [facility]  10/05/22  09:08 EDT

## 2022-10-05 NOTE — PLAN OF CARE
Goal Outcome Evaluation:  Plan of Care Reviewed With: patient        Progress: improving  Outcome Evaluation: No complaints voiced, denies pain, VSS, aflutter on monitor, eyes closed at short intervals, resting quietly in room, will continue to monitor

## 2022-10-06 ENCOUNTER — READMISSION MANAGEMENT (OUTPATIENT)
Dept: CALL CENTER | Facility: HOSPITAL | Age: 57
End: 2022-10-06

## 2022-10-06 VITALS
TEMPERATURE: 98.3 F | HEART RATE: 81 BPM | DIASTOLIC BLOOD PRESSURE: 75 MMHG | SYSTOLIC BLOOD PRESSURE: 125 MMHG | RESPIRATION RATE: 18 BRPM | WEIGHT: 124.3 LBS | OXYGEN SATURATION: 96 % | HEIGHT: 62 IN | BODY MASS INDEX: 22.87 KG/M2

## 2022-10-06 PROBLEM — E11.65 TYPE 2 DIABETES MELLITUS WITH HYPERGLYCEMIA, WITH LONG-TERM CURRENT USE OF INSULIN: Status: ACTIVE | Noted: 2022-10-06

## 2022-10-06 PROBLEM — I50.23 ACUTE ON CHRONIC SYSTOLIC CHF (CONGESTIVE HEART FAILURE): Status: ACTIVE | Noted: 2022-10-06

## 2022-10-06 PROBLEM — Z79.4 TYPE 2 DIABETES MELLITUS WITH HYPERGLYCEMIA, WITH LONG-TERM CURRENT USE OF INSULIN: Status: ACTIVE | Noted: 2022-10-06

## 2022-10-06 PROBLEM — J96.01 ACUTE RESPIRATORY FAILURE WITH HYPOXIA: Status: ACTIVE | Noted: 2022-01-01

## 2022-10-06 LAB
ANION GAP SERPL CALCULATED.3IONS-SCNC: 14.1 MMOL/L (ref 5–15)
BASOPHILS # BLD AUTO: 0.08 10*3/MM3 (ref 0–0.2)
BASOPHILS NFR BLD AUTO: 1.1 % (ref 0–1.5)
BUN SERPL-MCNC: 29 MG/DL (ref 6–20)
BUN/CREAT SERPL: 8.7 (ref 7–25)
CALCIUM SPEC-SCNC: 7.7 MG/DL (ref 8.6–10.5)
CHLORIDE SERPL-SCNC: 93 MMOL/L (ref 98–107)
CO2 SERPL-SCNC: 20.9 MMOL/L (ref 22–29)
CREAT SERPL-MCNC: 3.32 MG/DL (ref 0.57–1)
DEPRECATED RDW RBC AUTO: 50 FL (ref 37–54)
EGFRCR SERPLBLD CKD-EPI 2021: 15.7 ML/MIN/1.73
EOSINOPHIL # BLD AUTO: 0.05 10*3/MM3 (ref 0–0.4)
EOSINOPHIL NFR BLD AUTO: 0.7 % (ref 0.3–6.2)
ERYTHROCYTE [DISTWIDTH] IN BLOOD BY AUTOMATED COUNT: 15 % (ref 12.3–15.4)
GLUCOSE BLDC GLUCOMTR-MCNC: 126 MG/DL (ref 70–130)
GLUCOSE BLDC GLUCOMTR-MCNC: 184 MG/DL (ref 70–130)
GLUCOSE BLDC GLUCOMTR-MCNC: 443 MG/DL (ref 70–130)
GLUCOSE BLDC GLUCOMTR-MCNC: 457 MG/DL (ref 70–130)
GLUCOSE BLDC GLUCOMTR-MCNC: 495 MG/DL (ref 70–130)
GLUCOSE SERPL-MCNC: 508 MG/DL (ref 65–99)
HCT VFR BLD AUTO: 37.9 % (ref 34–46.6)
HGB BLD-MCNC: 11.2 G/DL (ref 12–15.9)
IMM GRANULOCYTES # BLD AUTO: 0.03 10*3/MM3 (ref 0–0.05)
IMM GRANULOCYTES NFR BLD AUTO: 0.4 % (ref 0–0.5)
LYMPHOCYTES # BLD AUTO: 1.1 10*3/MM3 (ref 0.7–3.1)
LYMPHOCYTES NFR BLD AUTO: 15.2 % (ref 19.6–45.3)
MCH RBC QN AUTO: 26.9 PG (ref 26.6–33)
MCHC RBC AUTO-ENTMCNC: 29.6 G/DL (ref 31.5–35.7)
MCV RBC AUTO: 91.1 FL (ref 79–97)
MONOCYTES # BLD AUTO: 0.56 10*3/MM3 (ref 0.1–0.9)
MONOCYTES NFR BLD AUTO: 7.7 % (ref 5–12)
NEUTROPHILS NFR BLD AUTO: 5.41 10*3/MM3 (ref 1.7–7)
NEUTROPHILS NFR BLD AUTO: 74.9 % (ref 42.7–76)
NRBC BLD AUTO-RTO: 0.1 /100 WBC (ref 0–0.2)
PLATELET # BLD AUTO: 159 10*3/MM3 (ref 140–450)
PMV BLD AUTO: 10.5 FL (ref 6–12)
POTASSIUM SERPL-SCNC: 4.4 MMOL/L (ref 3.5–5.2)
RBC # BLD AUTO: 4.16 10*6/MM3 (ref 3.77–5.28)
SODIUM SERPL-SCNC: 128 MMOL/L (ref 136–145)
WBC NRBC COR # BLD: 7.23 10*3/MM3 (ref 3.4–10.8)

## 2022-10-06 PROCEDURE — 99232 SBSQ HOSP IP/OBS MODERATE 35: CPT | Performed by: NURSE PRACTITIONER

## 2022-10-06 PROCEDURE — 63710000001 INSULIN LISPRO (HUMAN) PER 5 UNITS: Performed by: HOSPITALIST

## 2022-10-06 PROCEDURE — 82962 GLUCOSE BLOOD TEST: CPT

## 2022-10-06 PROCEDURE — 85025 COMPLETE CBC W/AUTO DIFF WBC: CPT | Performed by: UROLOGY

## 2022-10-06 PROCEDURE — 63710000001 INSULIN LISPRO (HUMAN) PER 5 UNITS: Performed by: UROLOGY

## 2022-10-06 PROCEDURE — 80048 BASIC METABOLIC PNL TOTAL CA: CPT | Performed by: STUDENT IN AN ORGANIZED HEALTH CARE EDUCATION/TRAINING PROGRAM

## 2022-10-06 RX ORDER — LANOLIN ALCOHOL/MO/W.PET/CERES
3 CREAM (GRAM) TOPICAL NIGHTLY
Qty: 30 TABLET | Refills: 0 | Status: ON HOLD | OUTPATIENT
Start: 2022-10-06 | End: 2023-02-11 | Stop reason: SDUPTHER

## 2022-10-06 RX ORDER — LEVOTHYROXINE SODIUM 0.12 MG/1
250 TABLET ORAL
Qty: 60 TABLET | Refills: 2 | Status: SHIPPED | OUTPATIENT
Start: 2022-10-06 | End: 2022-11-24 | Stop reason: HOSPADM

## 2022-10-06 RX ORDER — DILTIAZEM HYDROCHLORIDE 180 MG/1
180 CAPSULE, COATED, EXTENDED RELEASE ORAL
Qty: 30 CAPSULE | Refills: 2 | Status: SHIPPED | OUTPATIENT
Start: 2022-10-07 | End: 2022-11-24 | Stop reason: HOSPADM

## 2022-10-06 RX ORDER — CARVEDILOL 25 MG/1
25 TABLET ORAL 2 TIMES DAILY WITH MEALS
Qty: 60 TABLET | Refills: 2 | Status: SHIPPED | OUTPATIENT
Start: 2022-10-06 | End: 2023-01-29 | Stop reason: HOSPADM

## 2022-10-06 RX ORDER — INSULIN LISPRO 100 [IU]/ML
9 INJECTION, SOLUTION INTRAVENOUS; SUBCUTANEOUS ONCE
Status: COMPLETED | OUTPATIENT
Start: 2022-10-06 | End: 2022-10-06

## 2022-10-06 RX ORDER — CARVEDILOL 25 MG/1
25 TABLET ORAL 2 TIMES DAILY WITH MEALS
Status: DISCONTINUED | OUTPATIENT
Start: 2022-10-06 | End: 2022-10-06 | Stop reason: HOSPADM

## 2022-10-06 RX ORDER — HYDROCORTISONE 25 MG/G
CREAM TOPICAL 2 TIMES DAILY
Qty: 28 G | Refills: 0 | Status: ON HOLD | OUTPATIENT
Start: 2022-10-06 | End: 2022-10-27

## 2022-10-06 RX ORDER — DILTIAZEM HYDROCHLORIDE 180 MG/1
180 CAPSULE, COATED, EXTENDED RELEASE ORAL
Status: DISCONTINUED | OUTPATIENT
Start: 2022-10-07 | End: 2022-10-06 | Stop reason: HOSPADM

## 2022-10-06 RX ADMIN — CARVEDILOL 25 MG: 25 TABLET, FILM COATED ORAL at 18:43

## 2022-10-06 RX ADMIN — SERTRALINE 150 MG: 100 TABLET, FILM COATED ORAL at 08:39

## 2022-10-06 RX ADMIN — METOPROLOL TARTRATE 100 MG: 50 TABLET, FILM COATED ORAL at 08:39

## 2022-10-06 RX ADMIN — PANTOPRAZOLE SODIUM 40 MG: 40 TABLET, DELAYED RELEASE ORAL at 08:39

## 2022-10-06 RX ADMIN — INSULIN LISPRO 9 UNITS: 100 INJECTION, SOLUTION INTRAVENOUS; SUBCUTANEOUS at 08:38

## 2022-10-06 RX ADMIN — DILTIAZEM HYDROCHLORIDE 240 MG: 240 CAPSULE, COATED, EXTENDED RELEASE ORAL at 08:39

## 2022-10-06 RX ADMIN — INSULIN LISPRO 2 UNITS: 100 INJECTION, SOLUTION INTRAVENOUS; SUBCUTANEOUS at 12:33

## 2022-10-06 RX ADMIN — ACETAMINOPHEN 650 MG: 325 TABLET, FILM COATED ORAL at 02:34

## 2022-10-06 RX ADMIN — HYDROCORTISONE: 25 CREAM TOPICAL at 08:39

## 2022-10-06 RX ADMIN — APIXABAN 5 MG: 5 TABLET, FILM COATED ORAL at 08:39

## 2022-10-06 RX ADMIN — Medication 1 MG: at 08:39

## 2022-10-06 RX ADMIN — AMLODIPINE BESYLATE 2.5 MG: 10 TABLET ORAL at 08:39

## 2022-10-06 RX ADMIN — Medication 10 ML: at 08:39

## 2022-10-06 RX ADMIN — ASPIRIN 81 MG: 81 TABLET, COATED ORAL at 08:39

## 2022-10-06 RX ADMIN — INSULIN LISPRO 9 UNITS: 100 INJECTION, SOLUTION INTRAVENOUS; SUBCUTANEOUS at 06:42

## 2022-10-06 RX ADMIN — LEVOTHYROXINE SODIUM 250 MCG: 0.12 TABLET ORAL at 06:42

## 2022-10-06 NOTE — PLAN OF CARE
Goal Outcome Evaluation:               Patient with discharge orders.  will pick patient up after work- 1800. Patient denies pain- no acute distress noted, will continue to monitor until discharge is complete.

## 2022-10-06 NOTE — PROGRESS NOTES
"    Patient Name: Zaina Martinez  :1965  56 y.o.      Patient Care Team:  Jane Kyle NP as PCP - General (Family Medicine)  Adonis Fraga Jr., MD as Consulting Physician (Hematology and Oncology)    Chief Complaint: follow up atrial flutter    Interval History: asked to see for dc medication recommendations.     Objective   Vital Signs  Temp:  [97.5 °F (36.4 °C)-98.4 °F (36.9 °C)] 98.1 °F (36.7 °C)  Heart Rate:  [53-60] 58  Resp:  [16-18] 18  BP: (119-152)/(73-90) 152/90    Intake/Output Summary (Last 24 hours) at 10/6/2022 1138  Last data filed at 10/6/2022 0837  Gross per 24 hour   Intake 536 ml   Output 2500 ml   Net -1964 ml     Flowsheet Rows    Flowsheet Row First Filed Value   Admission Height 157.5 cm (62\") Documented at 2022 2305   Admission Weight 59 kg (130 lb) Documented at 2022 2305          Physical Exam:   General Appearance:    asleep   Lungs:     Clear to auscultation.  Normal respiratory effort and rate.      Heart:    Regular rhythm and normal rate, normal S1 and S2, no murmurs, gallops or rubs.     Chest Wall:    No abnormalities observed   Abdomen:     Soft, nontender, positive bowel sounds.     Extremities:   no cyanosis, clubbing or edema.  No marked joint deformities.  Adequate musculoskeletal strength.       Results Review:    Results from last 7 days   Lab Units 10/06/22  0714   SODIUM mmol/L 128*   POTASSIUM mmol/L 4.4   CHLORIDE mmol/L 93*   CO2 mmol/L 20.9*   BUN mg/dL 29*   CREATININE mg/dL 3.32*   GLUCOSE mg/dL 508*   CALCIUM mg/dL 7.7*         Results from last 7 days   Lab Units 10/06/22  0714   WBC 10*3/mm3 7.23   HEMOGLOBIN g/dL 11.2*   HEMATOCRIT % 37.9   PLATELETS 10*3/mm3 159                           Medication Review:   amLODIPine, 2.5 mg, Oral, Q24H  apixaban, 5 mg, Oral, Q12H  aspirin, 81 mg, Oral, Daily  dilTIAZem CD, 240 mg, Oral, Q24H  folic acid, 1 mg, Oral, Daily  Hydrocortisone (Perianal), , Rectal, BID  insulin glargine, 5 Units, Subcutaneous, " Nightly  insulin lispro, 0-9 Units, Subcutaneous, 4x Daily With Meals & Nightly  levothyroxine, 250 mcg, Oral, Q AM  melatonin, 3 mg, Oral, Nightly  metoprolol tartrate, 100 mg, Oral, Q12H  pantoprazole, 40 mg, Oral, Daily  rOPINIRole, 1 mg, Oral, Nightly  sertraline, 150 mg, Oral, Daily              Assessment & Plan     1.  Atrial flutter with RVR  -Rate controlled. Metoprolol held last night due to bradycardia.   - Suspect RVR was related to her physiologic stressors including volume overload, hypoxia and possible UTI, being worked up by primary team.  -Continue DOAC for stroke prevention [id number] markedly elevated at 6     2.  Small pericardial effusion  - Volume management per nephrology team.  No findings on exam to suggest pericarditis as cause     3.  Hypertension     4. Post op day 5 cystoscopy with left ureteral stent placement.     5. ESRD on HD    BP and HR well controlled however metoprolol held last night due to HR 52.     She is on amlodipine in addition to diltiazem.     Stop amlodipine and continue diltiazem at 180 mg daily since she has had some bradycardia. 's this morning. Since we are stopping amlodipine and decreasing dilt, will change metoprolol to carvedilol in hopes to keep adequate BP control. She will need close outpatient follow up for BP management.     No objection to discharge. Will arrange 1 week follow up in our office. She really needs to establish care in our office. We have only seen her in the hospital. [facility] is [facility] location to her house.      RIKA Felipe  [facility]  10/06/22  11:38 EDT

## 2022-10-06 NOTE — PLAN OF CARE
Goal Outcome Evaluation:  Plan of Care Reviewed With: patient      Patient awake overnight.Medicated for L leg pain. Up to BSC with assist. VSS. KEKE.Aflutter on tele. No acute distress noted this shift.

## 2022-10-06 NOTE — DISCHARGE SUMMARY
Patient Name: Zaina Martinez  : 1965  MRN: [id number]    Date of Admission: 2022  Date of Discharge:  10/6/2022  Primary Care Physician: Jane Kyle NP      Chief Complaint:   Weakness - Generalized (Pt arrived via PRP. Pt was dc from hospital 2 weeks ago. Pt has had increased lethargy x1 day with N/V. Pt missed dialysis Friday. )      Discharge Diagnoses     Active Hospital Problems    Diagnosis  POA   • **Acute UTI (urinary tract infection) [N39.0]  Yes   • Type 2 diabetes mellitus with hyperglycemia, with long-term current use of insulin ([facility]) [E11.65, Z79.4]  Not Applicable   • Acute respiratory failure with hypoxia ([facility]) [J96.01]  Yes   • Acute on chronic systolic CHF (congestive heart failure) ([facility]) [I50.23]  Yes   • Sepsis without acute organ dysfunction ([facility]) [A41.9]  Yes   • Chronic systolic CHF (congestive heart failure) ([facility]) [I50.22]  Yes   • Atrial flutter ([facility]) [I48.92]  Yes   • History of stroke- R MCA s/p TPA with subsequent ICH with debility [Z86.73]  Not Applicable   • Acute metabolic encephalopathy [G93.41]  Yes   • Hyponatremia [E87.1]  Yes   • Anemia due to chronic kidney disease, on chronic dialysis ([facility]) [N18.6, D63.1, Z99.2]  Not Applicable   • ESRD (end stage renal disease) ([facility]) [N18.6]  Yes   • CAD (coronary artery disease) [I25.10]  Yes   • Hypothyroidism [E03.9]  Yes   • Rheumatoid arthritis ([facility]) [M06.9]  Yes   • Hyperlipidemia [E78.5]  Yes   • Type 2 diabetes mellitus with kidney complication, with long-term current use of insulin ([facility]) [E11.29, Z79.4]  Not Applicable      Resolved Hospital Problems   No resolved problems to display.        Hospital Course     Ms. Martinez is a 56 y.o. female with history of HFrEF, ESRD on HD M/W/F, type 2 diabetes mellitus, CAD, atrial flutter, hypertension, hyperlipidemia, rheumatoid arthritis, hypothyroidism and anemia of end-stage renal disease who presented to [facility] with reported confusion, nausea and vomiting  that began yesterday prior to arrival.  Please see the admitting history and physical for further details. Subsequently admitted for acute on chronic HFrEF and sepsis secondary to acute urinary tract infection.     Sepsis secondary to acute urinary tract infection: [Resolved] Urine culture growing yeast-given that she had urinary tract manipulation, decision made to add anti-fungal treatment. Blood cultures NGTD.Completed treatment with ceftriaxone (10/02).  Completed treatment with fluconazole (10/04). Appears stable, no evidence to suggest recurrence.     Abnormal findings on CT Abdomen/Pelvis: Urology consulted, patient subsequently underwent cystoscopy with left ureteral stent insertion on 09/29.    Prior to discharge, discussed with nursing, and she was able to schedule patient for appointment with first urology, to be seen by Dr. Phan on 10/12/2022 at 0900.  This was discussed with patient and she was encouraged to keep this appointment.  She voiced understanding and was agreeable.     Acute hypoxic respiratory failure secondary to Acute on chronic systolic heart failure:  Noted on admission, secondary to volume overload from missing dialysis and acute on chronic systolic heart failure.  Volume removed with HD, nephrology and cardiology services both consulted and followed patient during her stay.  Acute issues resolved by day of discharge, patient was on room air without any evidence of acute distress, felt to be euvolemic per cardiology.  Continue with HD as previously scheduled following discharge, continue with medications as prescribed by cardiac cardiology after discharge.  Patient to follow-up with both nephrology and cardiology after discharge for reassessment to guide ongoing management decisions.     Type 2 diabetes mellitus with insulin dependence with hyperglycemia: Currently on correctional SSI + Lantus 10 units nightly (added 10/01).  Blood glucose mostly controlled and within target inpatient  range, however, did require intermittent adjustments. Plan to resume home insulin regimen as previously prescribed at day of discharge. Recommend consistent carbohydrate diet, and monitoring of blood glucose, patient encouraged to check her blood sugar 2-3 times daily and to record those values into her logbook and take with her to her next PCP visit to allow for greater insight into treatment efficacy, she voiced understanding.  Recommend reassessment with PCP at follow-up to guide ongoing management decisions.     Atrial flutter: Cardiology consulted and followed. Rate uncontrolled intermittently, requiring adjustment to treatment regimen. Cardiology evaluated on day of discharge, planning for patient to be discharged on the following regimen: Carvedilol 25 mg BID, Diltiazem 180 mg daily, Eliquis (home dose). Cardiology to schedule patient for outpatient follow up to guide ongoing management decisions.    End-stage renal disease: On HD M/W/F as outpatient.  Nephrology consulted and followed.    He is to resume her previous outpatient dialysis sessions as prescribed, next scheduled meeting tomorrow (10/07).     Hyponatremia:  Nephrology consulted and followed patient during her stay.  Was placed on 1200 cc fluid restriction.  Will continue with this at discharge per nephrology recommendations.  Na 128 on day of discharge.  Recommend repeat BMP with PCP after discharge for reassessment to guide ongoing management decisions, also, patient will need to be seen by nephrology after discharge for reassessment as well.    Hypertension: BP acceptable in the acute setting.  Cardiology followed along with nephrology, home amlodipine, clonidine and losartan held on admission, will continue to hold at time of discharge per recommendations.  She was started on treatment with carvedilol prior to discharge, this is to be continued after discharge.  Recommend follow-up with PCP after discharge for reassessment to guide ongoing  management decisions.    Anemia of end-stage renal disease: Hemoglobin stable. No indications to warrant acute intervention.  Nephrology following, EPO discontinued as patient was above goal.  Recommend repeat CBC and iron studies with PCP and nephrology after discharge for reassessment to guide ongoing management decisions.     Hypothyroidism: Stable.  Continue home levothyroxine as prescribed.  Recommend repeat thyroid studies once acute illness is resolved to gain more accurate insight into efficacy of current therapy.      Day of Discharge     Subjective:  Patient seen and examined this afternoon. Hospital day 10.  POD #7 (10/06/2022) s/p cystoscopy with left ureteral stent insertion, no acute events overnight, no acute distress. Patient is awake, alert, oriented x3. On room air with O2 sat greater than 90%, stable from prior examination.    Physical Exam:  Temp:  [97.5 °F (36.4 °C)-98.3 °F (36.8 °C)] 98.3 °F (36.8 °C)  Heart Rate:  [58-81] 81  Resp:  [16-18] 18  BP: (128-152)/(77-90) 128/82  Body mass index is 22.73 kg/m².  Physical Exam  Vitals and nursing note reviewed.   Constitutional:       General: She is awake. She is not in acute distress.     Appearance: She is ill-appearing.   HENT:      Head: Atraumatic.   Eyes:      General: No visual field deficit or scleral icterus.     Conjunctiva/sclera: Conjunctivae normal.      Pupils: Pupils are equal, round, and reactive to light.   Cardiovascular:      Rate and Rhythm: Normal rate and regular rhythm.      Pulses: Normal pulses.      Heart sounds: Normal heart sounds.   Pulmonary:      Effort: Pulmonary effort is normal. No accessory muscle usage or respiratory distress.      Comments: On room air  Abdominal:      General: Bowel sounds are normal.      Palpations: Abdomen is soft.      Tenderness: There is no abdominal tenderness.   Skin:     General: Skin is warm and dry.   Neurological:      General: No focal deficit present.      Mental Status: She is  alert and oriented to person, place, and time.      Cranial Nerves: No facial asymmetry.   Psychiatric:         Behavior: Behavior is cooperative.         Consultants     Consult Orders (all) (From admission, onward)     Start     Ordered    09/28/22 1300  Inpatient Cardiology Consult  Once        Specialty:  Cardiology  Provider:  Renea Brownlee MD    09/28/22 1307    09/27/22 1512  Inpatient Urology Consult  Once        Specialty:  Urology  Provider:  Bryant Phan Jr., MD    09/27/22 1512    09/27/22 0900  Inpatient Case Management  Consult  Once        Provider:  (Not yet assigned)    09/27/22 0541    09/27/22 0900  Inpatient Diabetes Educator Consult  Once,   Status:  Canceled        Provider:  (Not yet assigned)    09/27/22 0541    09/27/22 0054  Inpatient Nephrology Consult  Once        Specialty:  Nephrology  Provider:  Alejo Lange MD    09/27/22 0053    09/27/22 0009  [facility] (on-call MD unless specified) Details  Once        Specialty:  Hospitalist  Provider:  (Not yet assigned)    09/27/22 0008              Procedures     CYSTOSCOPY URETERAL STENT INSERTION WITH RETROGRADE PYELOGRAM      Imaging Results (All)     Procedure Component Value Units Date/Time    FL Retrograde Pyelogram In OR [[id number]] Collected: 09/29/22 1941     Updated: 09/29/22 1949    Narrative:      RETROGRADE PYELOGRAM     HISTORY: Left hydronephrosis on CT.     TECHNIQUE:  One minute and 35 seconds fluoroscopy was provided for Dr. Phan and 5  fluoroscopic images were saved. This is correlated with noncontrast CT  from two days ago.     FINDINGS: The saved images show instrumentation of the left ureter with  hydronephrosis and hydroureter and subsequent deployment of a left  ureteral stent. The left ureter tapers distally, as demonstrated on CT.       Impression:      Operative imaging provided for Dr. Phan during cystogram  with left retrograde pyelogram and stent deployment.     This report was  finalized on 9/29/2022 7:46 PM by Dr. Adonis Burch M.D.       CT Abdomen Pelvis Without Contrast [[id number]] Collected: 09/27/22 0909     Updated: 09/29/22 1223    Narrative:      CT ABDOMEN AND PELVIS WITHOUT CONTRAST     HISTORY: 56-year-old female with nausea and vomiting.     TECHNIQUE: Axial CT images of the abdomen and pelvis were obtained  without administration of intravenous contrast. The patient was not  given oral contrast. Coronal and sagittal reformats were obtained.     COMPARISON: 07/10/2022     FINDINGS: Diffuse anasarca with body wall edema is present. There are  bilateral small-to-moderate layering pleural effusions. Interstitial  thickening is seen within the right lung base. The left lower lobe  demonstrates complete collapse consolidation. Small intra-abdominal  ascites is present. The liver, spleen is unremarkable. Gallbladder is  surgically absent. The pancreas is atrophic. Bilateral adrenal glands  are normal. There is persistent left-sided hydroureteronephrosis with an  abrupt transition within the distalmost left ureter best demonstrated on  image 118 series 2. 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 The  urinary bladder is minimally distended with diffuse wall thickening.  Marked calcified atherosclerotic plaque is seen within the abdominal  aorta and its branches. The small and large bowel loops demonstrate  normal caliber. CT findings of constipation.     Enlarged retroperitoneal lymph nodes are without interim change from  prior examination. At T12, superior endplate compression fracture,  unchanged in appearance from prior imaging.       Impression:      1. Diffuse anasarca, bilateral moderate size layering pleural effusions,  mild interstitial pulmonary edema and small ascites are present. There  is complete collapse of the left lower lobe.  2. There is persistent left-sided hydroureteronephrosis with an abrupt  transition within the distalmost left ureter proximal to the  UV  junction. 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  3. Retroperitoneal lymphadenopathy without interval change from prior.     Radiation dose reduction techniques were utilized, including automated  exposure control and exposure modulation based on body size.     This report was finalized on 9/29/2022 12:20 PM by Dr. eFrnandez Santos M.D.       CT Head Without Contrast [[id number]] Collected: 09/27/22 0056     Updated: 09/27/22 0056    Narrative:        Patient: ELY MOTTA  Time Out: 00:55  Exam(s): CT HEAD Without Contrast     EXAM:    CT Head Without Intravenous Contrast    CLINICAL HISTORY:     Reason for exam: ams.    TECHNIQUE:    Axial computed tomography images of the head brain without intravenous   contrast.  CTDI is 53.6 mGy and DLP is 989.6 mGy-cm.  This CT exam was   performed according to the principle of ALARA (As Low As Reasonably   Achievable) by using one or more of the following dose reduction   techniques: automated exposure control, adjustment of the mA and or kV   according to patient size, and or use of iterative reconstruction   technique.    COMPARISON:    No relevant prior studies available.    FINDINGS:    Brain:  Mild supratentorial white matter disease and atrophy.  No   hemorrhage.    Ventricles:  Unremarkable.  No ventriculomegaly.    Bones joints:  Unremarkable.  No acute fracture.    Soft tissues:  Unremarkable.    Vasculature:  Dense cavernous carotid atherosclerosis.    Sinuses:  Unremarkable as visualized.  No acute sinusitis.    Mastoid air cells:  Unremarkable as visualized.  No mastoid effusion.    IMPRESSION:         No acute findings in the head brain.      Impression:          Electronically signed by Mark Don MD on 09-27-22 at 0055    XR Chest 1 View [[id number]] Collected: 09/26/22 2329     Updated: 09/26/22 2340    Narrative:      CHEST SINGLE VIEW     HISTORY: Shortness of air     COMPARISON: AP chest 09/03/2022,  08/25/2022.      FINDINGS: Heart size is enlarged and there is vascular engorgement and  perihilar hydrostatic interstitial pulmonary edema. Small-to-moderate  left pleural effusion is present. There is opacification of the left  lung base with silhouetting of the left hemidiaphragm that is without  change. Dual-lumen hemodialysis catheter extends into the right atrium.       Impression:      Findings of volume overload with perihilar hydrostatic  interstitial pulmonary edema. Left greater than right pleural effusions  with unchanged left basilar opacity and silhouetting of the left  hemidiaphragm.     This report was finalized on 9/26/2022 11:37 PM by Dr. Aguila Pulido M.D.           Results for orders placed during the hospital encounter of 05/13/22    Duplex Venous Lower Extremity - Left CAR    Interpretation Summary  · Normal left lower extremity venous duplex scan. Venous pulsatility is noted.    Results for orders placed during the hospital encounter of 08/19/22    Adult Transesophageal Echo (ANJEL) W/ Cont if Necessary Per Protocol    Interpretation Summary  · There is a dialysis catheter which extends into the right atrial cavity. There is a large and highly mobile echodensity attached to the distal portion of the dialysis catheter. The echodensity measures 1.4 cm x 0.86 cm, and is most consistent with a thrombus  · Estimated left ventricular EF = 55% Left ventricular systolic function is normal.  · Left ventricular wall thickness is consistent with mild concentric hypertrophy.  · Normal right ventricular cavity size and systolic function noted.  · Doppler interrogation shows severely reduced flow within the left atrial appendage. No evidence of a left atrial appendage thrombus was present.  · There is a small PFO noted on the agitated saline study  · Moderate mitral valve regurgitation is present  · Moderate tricuspid valve regurgitation is present.  · Calculated right ventricular systolic pressure from  tricuspid regurgitation is 42 mmHg.  · There is a small focal area of severe plaque in the descending thoracic aorta. No significant plaques were seen in the aortic arch  · There is a small circumferential pericardial effusion without evidence of tamponade. There is a small left pleural effusion    Pertinent Labs     Results from last 7 days   Lab Units 10/06/22  0714 10/05/22  0647 10/04/22  0636 10/03/22  0551   WBC 10*3/mm3 7.23 10.04 8.65 11.89*   HEMOGLOBIN g/dL 11.2* 11.6* 12.5 12.9   PLATELETS 10*3/mm3 159 202 185 234     Results from last 7 days   Lab Units 10/06/22  0714 10/05/22  0647 10/04/22  0636 10/03/22  0551   SODIUM mmol/L 128* 131* 132* 130*   POTASSIUM mmol/L 4.4 4.6 4.4 5.5*   CHLORIDE mmol/L 93* 98 97* 95*   CO2 mmol/L 20.9* 24.0 20.1* 18.5*   BUN mg/dL 29* 34* 23* 46*   CREATININE mg/dL 3.32* 3.96* 3.01* 4.79*   GLUCOSE mg/dL 508* 98 129* 235*   EGFR mL/min/1.73 15.7* 12.7* 17.7* 10.1*     Results from last 7 days   Lab Units 10/05/22  0647 10/04/22  0636 10/03/22  0551 10/02/22  1036   ALBUMIN g/dL 3.00* 2.70* 3.00* 3.20*     Results from last 7 days   Lab Units 10/06/22  0714 10/05/22  0647 10/04/22  0636 10/03/22  0551 10/02/22  1036   CALCIUM mg/dL 7.7* 7.7* 7.9* 8.1* 8.2*   ALBUMIN g/dL  --  3.00* 2.70* 3.00* 3.20*   PHOSPHORUS mg/dL  --  4.5 3.8 6.0* 5.4*               Invalid input(s): LDLCALC          Test Results Pending at Discharge       Discharge Details        Discharge Medications      New Medications      Instructions Start Date   carvedilol 25 MG tablet  Commonly known as: COREG   25 mg, Oral, 2 Times Daily With Meals      Hydrocortisone (Perianal) 2.5 % rectal cream  Commonly known as: ANUSOL-HC   Rectal, 2 Times Daily      melatonin 3 MG tablet   3 mg, Oral, Nightly         Changes to Medications      Instructions Start Date   dilTIAZem  MG 24 hr capsule  Commonly known as: CARDIZEM CD  What changed:   medication strength  how much to take   180 mg, Oral, Every 24 Hours  Scheduled   Start Date: October 7, 2022     insulin lispro 100 UNIT/ML injection  Commonly known as: ADMELOG  What changed: additional instructions   0-14 Units, Subcutaneous, 3 Times Daily Before Meals      miconazole 2 % cream  Commonly known as: MICOTIN  What changed: additional instructions   1 application, Topical, Every 12 Hours Scheduled      predniSONE 5 MG tablet  Commonly known as: DELTASONE  What changed:   how much to take  how to take this  when to take this   Two tabs po with breakfast July 26-July 28, 1.5 tabs with breakfast July 29-August 4, then 1 tab daily starting Friday August 5, 2022 and continue on that dose         Continue These Medications      Instructions Start Date   aspirin 81 MG EC tablet   81 mg, Oral, Daily      Eliquis 5 MG tablet tablet  Generic drug: apixaban   5 mg, Oral, Every 12 Hours Scheduled      folic acid 1 MG tablet  Commonly known as: FOLVITE   1 mg, Oral, Daily      insulin glargine 100 UNIT/ML injection  Commonly known as: LANTUS, SEMGLEE   3 Units, Subcutaneous, Nightly      levothyroxine 125 MCG tablet  Commonly known as: SYNTHROID, LEVOTHROID   250 mcg, Oral, Every Early Morning      pantoprazole 40 MG EC tablet  Commonly known as: PROTONIX   40 mg, Oral, Daily      polyethylene glycol 17 g packet  Commonly known as: MIRALAX   17 g, Oral, Daily PRN      rOPINIRole 1 MG tablet  Commonly known as: REQUIP   1 mg, Oral, Nightly, Take 1 hour before bedtime.      sertraline 50 MG tablet  Commonly known as: ZOLOFT   150 mg, Oral, Daily         Stop These Medications    amLODIPine 5 MG tablet  Commonly known as: NORVASC     cloNIDine 0.1 MG tablet  Commonly known as: CATAPRES     epoetin brooke-epbx 28959 UNIT/ML injection  Commonly known as: RETACRIT     gabapentin 100 MG capsule  Commonly known as: NEURONTIN     losartan 100 MG tablet  Commonly known as: COZAAR     metoprolol tartrate 100 MG tablet  Commonly known as: LOPRESSOR     sodium zirconium cyclosilicate 5 g  pack  Commonly known as: LOKELMA            Allergies   Allergen Reactions   • Contrast Dye Confusion   • Ibuprofen Other (See Comments)     Kidney disease   • Prochlorperazine Other (See Comments)     Dystonic reaction            Discharge Disposition:  Home-Health Care c      Discharge Diet:  Diet Order   Procedures   • Diet Regular; Cardiac, Daily Fluid Restriction; Other; 1,200       Discharge Activity:   Activity Instructions     Activity as Tolerated            CODE STATUS:    Code Status and Medical Interventions:   Ordered at: 09/27/22 0052     Code Status (Patient has no pulse and is not breathing):    CPR (Attempt to Resuscitate)     Medical Interventions (Patient has pulse or is breathing):    Full Support       Future Appointments   Date Time Provider Department Center   10/18/2022 10:30 AM Flor Wilkes APRN [facility]     Additional Instructions for the Follow-ups that You Need to Schedule     Discharge Follow-up with PCP   As directed       Currently Documented PCP:    Jane Kyle NP    PCP Phone Number:    [phone]     Follow Up Details: Within 3 to 5 days after discharge for reassessment         Discharge Follow-up with Specialty: Nephrology, Cardiology, Urology   As directed      Specialty: Nephrology, Cardiology, Urology    Follow Up Details: Please follow up with Nephrology, Cardiology, Urology as scheduled. Please call their office to ensure follow up is scheduled.            Contact information for follow-up providers     Jono Camops MD. Schedule an appointment as soon as possible for a visit in 1 week(s).    Specialty: Cardiology  Contact information:  [address] [address] [phone]             Bryant Phan Jr., MD Follow up on 10/12/2022.    Specialty: Urology  Why: appointment made for 9am on October 12, 2022.  Contact information:  [address] [id number] [phone]             Saroj  JOSÉ MANUEL Lara .    Specialty: Family Medicine  Why: Within 3 to 5 days after discharge for reassessment  Contact information:  [address], [address]  [facility] [address]  [phone]                   Contact information for after-discharge care     Durable Medical Equipment     [facility] .    Service: Durable Medical Equipment  Contact information:  [address]  [facility] [address]  [phone]                 Home Medical Care     [facility] .    Service: Home Health Services  Contact information:  [address]  [facility] [address]  [phone]                             Additional Instructions for the Follow-ups that You Need to Schedule     Discharge Follow-up with PCP   As directed       Currently Documented PCP:    Jane Kyle NP    PCP Phone Number:    [phone]     Follow Up Details: Within 3 to 5 days after discharge for reassessment         Discharge Follow-up with Specialty: Nephrology, Cardiology, Urology   As directed      Specialty: Nephrology, Cardiology, Urology    Follow Up Details: Please follow up with Nephrology, Cardiology, Urology as scheduled. Please call their office to ensure follow up is scheduled.           Time Spent on Discharge:  Greater than 30 minutes      Jimmy Armenta DO  [facility]  10/06/22  15:29 EDT

## 2022-10-06 NOTE — PROGRESS NOTES
[facility] Progress Note      Patient Name: Zaina Martinez  : 1965  MRN: [id number]  Primary Care Physician:  Jane Kyle NP  Date of admission: 2022    Subjective     Interval History:   Follow up ESRD. Not sleeping at night. Melatonin not helping at all.  Dialysis yesterday. 2.5 kg off.  Wants to go home.   Review of Systems:   As noted above    Objective     Vitals:   Temp:  [97.5 °F (36.4 °C)-98.4 °F (36.9 °C)] 98.1 °F (36.7 °C)  Heart Rate:  [53-60] 58  Resp:  [16-18] 18  BP: (119-152)/(73-90) 152/90  Flow (L/min):  [2] 2    Intake/Output Summary (Last 24 hours) at 10/6/2022 1006  Last data filed at 10/6/2022 0837  Gross per 24 hour   Intake 536 ml   Output 2500 ml   Net -1964 ml       Physical Exam:    General Appearance: alert,sitting up in bed. no acute distress   Skin: warm and dry  HEENT: oral mucosa normal, nonicteric sclera  Neck: RIJ tdc nontender tunnel or exit site.    Lungs: clear to auscultation.   Heart: RRR, normal S1 and S2  Abdomen: soft, nontender, nondistended. + bs  Extremities: no edema.  Neuro: normal speech and mental status     Scheduled Meds:     amLODIPine, 2.5 mg, Oral, Q24H  apixaban, 5 mg, Oral, Q12H  aspirin, 81 mg, Oral, Daily  dilTIAZem CD, 240 mg, Oral, Q24H  folic acid, 1 mg, Oral, Daily  Hydrocortisone (Perianal), , Rectal, BID  insulin glargine, 5 Units, Subcutaneous, Nightly  insulin lispro, 0-9 Units, Subcutaneous, 4x Daily With Meals & Nightly  levothyroxine, 250 mcg, Oral, Q AM  melatonin, 3 mg, Oral, Nightly  metoprolol tartrate, 100 mg, Oral, Q12H  pantoprazole, 40 mg, Oral, Daily  rOPINIRole, 1 mg, Oral, Nightly  sertraline, 150 mg, Oral, Daily      IV Meds:        Results Reviewed:   I have personally reviewed the results from the time of this admission to 10/6/2022 10:06 EDT     Results from last 7 days   Lab Units 10/06/22  0714 10/05/22  0647 10/04/22  0636   SODIUM mmol/L 128* 131* 132*   POTASSIUM mmol/L 4.4 4.6 4.4    CHLORIDE mmol/L 93* 98 97*   CO2 mmol/L 20.9* 24.0 20.1*   BUN mg/dL 29* 34* 23*   CREATININE mg/dL 3.32* 3.96* 3.01*   CALCIUM mg/dL 7.7* 7.7* 7.9*   GLUCOSE mg/dL 508* 98 129*       Estimated Creatinine Clearance: 16.8 mL/min (A) (by C-G formula based on SCr of 3.32 mg/dL (H)).    Results from last 7 days   Lab Units 10/05/22  0647 10/04/22  0636 10/03/22  0551   PHOSPHORUS mg/dL 4.5 3.8 6.0*             Results from last 7 days   Lab Units 10/06/22  0714 10/05/22  0647 10/04/22  0636 10/03/22  0551 10/02/22  0738   WBC 10*3/mm3 7.23 10.04 8.65 11.89* 9.22   HEMOGLOBIN g/dL 11.2* 11.6* 12.5 12.9 11.8*   PLATELETS 10*3/mm3 159 202 185 234 158             Assessment / Plan     ASSESSMENT:    1. ESRD.  Dialysis MWF. Continue to remove volume .  Correct K and sodium .  Removing volume with HD, but weight not changing dramatically.   2. Left hydroureter,  SP cysto with left ureteral stent 9/29/22.   3. Dialysis catheter thrombus.  PFO.  Chronic AC on eliquis .  4. Chronic systolic heart failure . Compensated .  5. Hypothyroid on synthroid . TSH early September 99.  Recheck better at 44.8.    6. DM2  7. HTN. Overall better .   8. Anemia ESRD  Above goal. Off ANDRAE.   9. Pyuria. Yeast on culture. On diflucan x 1 dose 10/3.  Completed ceftriaxone 10/2.   PLAN:  1. Dialysis tomorrow.       Ruth Lira MD  10/06/22  10:06 EDT    [facility]  [phone]

## 2022-10-06 NOTE — PROGRESS NOTES
Nutrition Services    Patient Name:  Zaina Martinez  YOB: 1965  MRN: [id number]  Admit Date:  9/26/2022    Comment: Nutrition screen completed for Length of Stay  Pt on Regular Cardiac diet with 1200cc fluid restriction.  Labs Na 128, glu 184, bun 29, cr 3.32  Visited pt while eating breakfast, po intake 25-75%  Discussed fluid restriction and pt asking for milk which Rn obtained for her.  Will continue to monitor.     Pt with CLINICAL NUTRITION ASSESSMENT      Reason for Assessment Length of Stay     Diagnosis/Problem   ESRD on HD, type 2 diabetes mellitus, CAD, atrial flutter, hypertension, hyperlipidemia, rheumatoid arthritis, hypothyroidism and anemia of end-stage renal disease who presents to [facility] with reported confusion, nausea and vomiting        Medical/Surgical History Past Medical History:   Diagnosis Date   • Acute CVA (cerebrovascular accident) ([facility]) 5/1/2022   • Acute on chronic diastolic CHF (congestive heart failure) ([facility])    • CAD (coronary artery disease) 12/20/2021   • Diabetes ([facility])    • Disease of thyroid gland    • GERD (gastroesophageal reflux disease)    • Hyperlipidemia 11/30/2018   • Hypertension    • Rheumatoid arthritis ([facility])        Past Surgical History:   Procedure Laterality Date   • CHOLECYSTECTOMY WITH INTRAOPERATIVE CHOLANGIOGRAM N/A 1/10/2022    Procedure: Laparoscopic cholecystectomy with intraoperative cholangiogram;  Surgeon: Ramana Raygoza MD;  Location: [facility];  Service: General;  Laterality: N/A;   • CYSTOSCOPY W/ URETERAL STENT PLACEMENT Left 9/29/2022    Procedure: CYSTOSCOPY URETERAL STENT INSERTION WITH RETROGRADE PYELOGRAM;  Surgeon: Bryant Phan Jr., MD;  Location: [facility];  Service: Urology;  Laterality: Left;   • EYE SURGERY     • HYSTERECTOMY     • INSERTION HEMODIALYSIS CATHETER N/A 12/6/2021    Procedure: HEMODIALYSIS CATHETER INSERTION;  Surgeon: Keli Salazar MD;  Location: [facility] 18/19;   "Service: Vascular;  Laterality: N/A;   • INSERTION HEMODIALYSIS CATHETER N/A 5/3/2022    Procedure: TUNNELED CATHETER PLACEMENT;  Surgeon: Keli Salazar MD;  Location: [facility] OR;  Service: Vascular;  Laterality: N/A;   • MUSCLE BIOPSY Left 6/15/2022    Procedure: Left quadriceps muscle biopsy;  Surgeon: Marques Ma MD;  Location: [facility] MAIN OR;  Service: Neurosurgery;  Laterality: Left;        Encounter Information        Nutrition/Diet History:  fair   Food Preferences: Likes milk with her breakfast   Supplements:    Factors Affecting Intake:      Anthropometrics        Current Height  Current Weight  BMI kg/m2 Height: 157.5 cm (62.01\")  Weight: 56.4 kg (124 lb 4.8 oz) (10/06/22 0511)  Body mass index is 22.73 kg/m².       Admission Weight    Ideal Body Weight (IBW) 50.1 kg   Usual Body Weight (UBW)    Weight Change/Trend        Weight History Wt Readings from Last 30 Encounters:   10/06/22 0511 56.4 kg (124 lb 4.8 oz)   10/05/22 0300 57.1 kg (125 lb 14.1 oz)   10/04/22 1516 54.2 kg (119 lb 8 oz)   10/04/22 0530 58 kg (127 lb 12.8 oz)   10/03/22 0500 57.7 kg (127 lb 3.3 oz)   10/02/22 0556 56.2 kg (123 lb 14.4 oz)   10/01/22 0500 55.2 kg (121 lb 12.8 oz)   09/30/22 1114 61 kg (134 lb 7.7 oz)   09/30/22 0551 64.5 kg (142 lb 3.2 oz)   09/29/22 0513 63 kg (138 lb 14.2 oz)   09/28/22 0615 59.8 kg (131 lb 13.4 oz)   09/27/22 1455 52.8 kg (116 lb 6.5 oz)   09/27/22 0600 56.3 kg (124 lb 1.9 oz)   09/27/22 0503 56.3 kg (124 lb 1.9 oz)   09/26/22 2305 59 kg (130 lb)   09/13/22 0500 60 kg (132 lb 4.4 oz)   09/11/22 0548 59.8 kg (131 lb 14.4 oz)   09/10/22 0530 56.7 kg (125 lb 1.6 oz)   09/09/22 0500 55.4 kg (122 lb 1.6 oz)   09/08/22 0511 59.5 kg (131 lb 3.2 oz)   09/07/22 1230 54.5 kg (120 lb 2.4 oz)   09/07/22 0640 56.2 kg (124 lb)   09/06/22 0539 55.5 kg (122 lb 6.4 oz)   09/05/22 0505 58.4 kg (128 lb 12.8 oz)   09/04/22 0500 62.6 kg (138 lb 1.6 oz)   09/03/22 1708 64.9 kg (143 lb 1.3 oz)   09/03/22 1355 " 64.9 kg (143 lb 1.3 oz)   08/27/22 0527 64.9 kg (143 lb 1.6 oz)   08/26/22 0942 64.9 kg (143 lb)   08/26/22 0500 66.6 kg (146 lb 12.8 oz)   08/25/22 0810 67 kg (147 lb 11.3 oz)   08/25/22 0500 67.1 kg (147 lb 14.4 oz)   08/24/22 0518 67.7 kg (149 lb 4 oz)   08/23/22 0526 66.8 kg (147 lb 4.3 oz)   08/22/22 0521 67.5 kg (148 lb 14.4 oz)   08/21/22 0846 65.3 kg (144 lb)   08/21/22 0350 65.4 kg (144 lb 3.2 oz)   08/20/22 0516 65 kg (143 lb 3.2 oz)   08/04/22 0511 65.1 kg (143 lb 8.3 oz)   08/03/22 0504 66.8 kg (147 lb 4.3 oz)   08/02/22 0602 65.4 kg (144 lb 2.9 oz)   08/01/22 1557 62.5 kg (137 lb 12.6 oz)   08/01/22 0700 68.3 kg (150 lb 9.2 oz)   07/31/22 0500 64.3 kg (141 lb 12.1 oz)   07/30/22 0638 65.2 kg (143 lb 11.8 oz)   07/29/22 1532 66.7 kg (147 lb)   07/29/22 0548 66.7 kg (147 lb 0.8 oz)   07/28/22 0610 65 kg (143 lb 4.8 oz)   07/27/22 2055 65.6 kg (144 lb 9.6 oz)   07/22/22 1542 66.3 kg (146 lb 2.6 oz)   07/22/22 0632 69.3 kg (152 lb 12.5 oz)   07/21/22 0539 69.7 kg (153 lb 10.6 oz)   07/18/22 0842 71.1 kg (156 lb 11.2 oz)   07/17/22 0549 68.6 kg (151 lb 3.8 oz)   07/16/22 0633 67 kg (147 lb 12.8 oz)   07/14/22 1738 69.9 kg (154 lb)   07/13/22 1041 61.7 kg (136 lb 1.6 oz)   07/04/22 1756 75.5 kg (166 lb 7.2 oz)   07/01/22 1700 73.9 kg (163 lb)   06/15/22 1100 62.5 kg (137 lb 12.6 oz)   06/12/22 0743 66.2 kg (145 lb 15.1 oz)   05/26/22 1700 64.9 kg (143 lb 1.3 oz)   05/24/22 1726 64.5 kg (142 lb 3.2 oz)   05/16/22 0817 54.2 kg (119 lb 7.8 oz)   05/13/22 1911 54.2 kg (119 lb 7.8 oz)   05/13/22 1300 51 kg (112 lb 7 oz)   05/13/22 0700 54.7 kg (120 lb 8 oz)   05/12/22 0514 55.9 kg (123 lb 3.8 oz)   05/11/22 0535 54.1 kg (119 lb 4.3 oz)   05/10/22 0512 52.9 kg (116 lb 9.6 oz)   05/09/22 0535 56.3 kg (124 lb 3.2 oz)   05/08/22 0614 54.7 kg (120 lb 9.5 oz)   05/07/22 0552 48.2 kg (106 lb 4.2 oz)   05/06/22 0500 46 kg (101 lb 6.4 oz)   05/05/22 1154 46.2 kg (101 lb 13.6 oz)   05/05/22 0647 54.5 kg (120 lb 2.4 oz)    05/04/22 0300 57.2 kg (126 lb 1.7 oz)   05/03/22 0520 59.9 kg (132 lb 0.9 oz)   05/01/22 1135 57.8 kg (127 lb 6.8 oz)   05/01/22 0600 57.8 kg (127 lb 6.8 oz)   04/29/22 1900 50.3 kg (110 lb 14.3 oz)   04/29/22 0519 50.3 kg (110 lb 12.8 oz)   04/28/22 0558 55.9 kg (123 lb 3.8 oz)   04/28/22 0427 54.8 kg (120 lb 13 oz)   04/28/22 1511 55.8 kg (123 lb)   03/02/22 1615 62.4 kg (137 lb 9.1 oz)   03/02/22 0020 64.4 kg (141 lb 15.6 oz)   03/01/22 0434 62 kg (136 lb 11.2 oz)   02/28/22 0413 62.2 kg (137 lb 3.2 oz)   02/27/22 0644 64.1 kg (141 lb 5 oz)   02/26/22 0500 65.1 kg (143 lb 8.3 oz)   02/25/22 0500 65.4 kg (144 lb 2.9 oz)   02/24/22 0500 65.9 kg (145 lb 4.5 oz)   02/23/22 2300 64.9 kg (143 lb 1.3 oz)   01/24/22 0814 71.4 kg (157 lb 6.4 oz)   01/15/22 0500 71.2 kg (156 lb 15.5 oz)   01/14/22 0500 69.6 kg (153 lb 7 oz)   01/13/22 0500 69.7 kg (153 lb 10.6 oz)   01/12/22 0500 69.9 kg (154 lb 1.6 oz)   01/11/22 0500 86.3 kg (190 lb 4.1 oz)   01/10/22 0700 82.3 kg (181 lb 8 oz)   01/09/22 1808 77.6 kg (171 lb)   01/09/22 1006 79.4 kg (175 lb)   12/29/21 0508 71.8 kg (158 lb 3.2 oz)   12/28/21 1251 73.7 kg (162 lb 7.7 oz)   12/28/21 0513 73.7 kg (162 lb 7.7 oz)   12/27/21 0500 67.7 kg (149 lb 3.2 oz)   12/26/21 1053 70.7 kg (155 lb 13.8 oz)   12/26/21 0500 73.8 kg (162 lb 9.6 oz)   12/25/21 0457 72.3 kg (159 lb 4.8 oz)   12/24/21 0500 72.6 kg (160 lb 1.6 oz)   12/23/21 0500 75.9 kg (167 lb 6.4 oz)   12/22/21 0500 73.9 kg (163 lb)   12/20/21 1100 70.3 kg (155 lb)   12/19/21 1338 69.9 kg (154 lb)   12/17/21 0449 69.9 kg (154 lb)   12/16/21 1300 70.2 kg (154 lb 12.2 oz)   12/16/21 0306 72.3 kg (159 lb 4.8 oz)   12/15/21 1531 70.2 kg (154 lb 12.8 oz)   12/01/21 2300 61.2 kg (135 lb)   11/30/21 1252 59 kg (130 lb)   11/30/21 0200 59 kg (130 lb)                 Tests/Procedures        Tests/Procedures Dialysis     Labs       Pertinent Labs    Results from last 7 days   Lab Units 10/06/22  0714 10/05/22  0647 10/04/22  0636    SODIUM mmol/L 128* 131* 132*   POTASSIUM mmol/L 4.4 4.6 4.4   CHLORIDE mmol/L 93* 98 97*   CO2 mmol/L 20.9* 24.0 20.1*   BUN mg/dL 29* 34* 23*   CREATININE mg/dL 3.32* 3.96* 3.01*   CALCIUM mg/dL 7.7* 7.7* 7.9*   GLUCOSE mg/dL 508* 98 129*     Results from last 7 days   Lab Units 10/06/22  0714 10/05/22  0647   PHOSPHORUS mg/dL  --  4.5   HEMOGLOBIN g/dL 11.2* 11.6*   HEMATOCRIT % 37.9 38.9   WBC 10*3/mm3 7.23 10.04     Results from last 7 days   Lab Units 10/06/22  0714 10/05/22  0647 10/04/22  0636 10/03/22  0551 10/02/22  0738   PLATELETS 10*3/mm3 159 202 185 234 158     COVID19   Date Value Ref Range Status   09/03/2022 Not Detected Not Detected - Ref. Range Final     Lab Results   Component Value Date    HGBA1C 7.70 (H) 09/04/2022          Medications           Scheduled Medications amLODIPine, 2.5 mg, Oral, Q24H  apixaban, 5 mg, Oral, Q12H  aspirin, 81 mg, Oral, Daily  dilTIAZem CD, 240 mg, Oral, Q24H  folic acid, 1 mg, Oral, Daily  Hydrocortisone (Perianal), , Rectal, BID  insulin glargine, 5 Units, Subcutaneous, Nightly  insulin lispro, 0-9 Units, Subcutaneous, 4x Daily With Meals & Nightly  levothyroxine, 250 mcg, Oral, Q AM  melatonin, 3 mg, Oral, Nightly  metoprolol tartrate, 100 mg, Oral, Q12H  pantoprazole, 40 mg, Oral, Daily  rOPINIRole, 1 mg, Oral, Nightly  sertraline, 150 mg, Oral, Daily       Infusions     PRN Medications •  acetaminophen **OR** acetaminophen **OR** acetaminophen  •  dextrose  •  dextrose  •  glucagon (human recombinant)  •  heparin (porcine)  •  nitroglycerin  •  ondansetron **OR** ondansetron  •  polyethylene glycol  •  sodium chloride     Physical Findings        Physical Appearance alert     NFPE Not applicable   --  Edema  no edema   Gastrointestinal normoactive   Tubes/Drains tunneled dialysis catheter   Oral/Mouth Cavity teeth missing   Skin bruising   --  Current Nutrition Orders & Evaluation of Intake       Oral Nutrition     Food Allergies NKFA   Current PO Diet Diet  Regular; Cardiac, Daily Fluid Restriction; Other; 1,200   Supplement    PO Evaluation     Trending % PO Intake 25-75%       --  PES STATEMENT / NUTRITION DIAGNOSIS      Nutrition Dx Problem  Problem: Nutrition Appropriate for Condition at this Time  Etiology: Medical Diagnosis  Signs/Symptoms:     Comment:    --  NUTRITION INTERVENTION      Intervention Goal(s) Maintain nutrition status, Reduce/improve symptoms, Disease management/therapy, Maintain weight and PO intake goal %: 75         RD Intervention/Action Advise alternative selection, Advise available snack, Menu provided, Follow Tx Progress and Care plan reviewed         Prescription/Orders:       PO Diet       Supplements       Enteral Nutrition       Parenteral Nutrition    New Prescription Ordered?    --      Monitor/Evaluation Per protocol   Education Will instruct as appropriate   --    RD to follow per protocol.      Electronically signed by:  Dang Morel RD  10/06/22 10:51 EDT

## 2022-10-07 NOTE — CASE MANAGEMENT/SOCIAL WORK
Case Management Discharge Note      Final Note: Pt discharged home with [facility] on 10/6.   AVINASH Robles RN         Selected Continued Care - Discharged on 10/6/2022 Admission date: 9/26/2022 - Discharge disposition: Home-Health Care Svc    Destination    No services have been selected for the patient.              Durable Medical Equipment Coordination complete.    Service Provider Selected Services Address Phone Fax Patient Preferred    [facility]  Durable Medical Equipment [address] [place] [id number] [phone] --          Dialysis/Infusion    No services have been selected for the patient.              Home Medical Care Coordination complete.    Service Provider Selected Services Address Phone Fax Patient Preferred    [facility]  Home Health Services [address] [address] [phone] --          Therapy    No services have been selected for the patient.              Community Resources    No services have been selected for the patient.              Community & DME    No services have been selected for the patient.                Selected Continued Care - Prior Encounters Includes selections from prior encounters from 6/28/2022 to 10/6/2022    Discharged on 9/13/2022 Admission date: 9/3/2022 - Discharge disposition: Home-Health Care Svc    Durable Medical Equipment     Service Provider Selected Services Address Phone Fax Patient Preferred    [facility]  Durable Medical Equipment [address] [place] [id number] [phone] --          Home Medical Care     Service Provider Selected Services Address Phone Fax Patient Preferred    [facility]  Home Health Services [address] [address] [phone] --                Discharged on 8/27/2022 Admission date: 8/19/2022 - Discharge disposition: Home-Health Care Svc    Durable  Medical Equipment     Service Provider Selected Services Address Phone Fax Patient Preferred    [facility]  Durable Medical Equipment [address] [facility] [address] [phone] --          Home Medical Care     Service Provider Selected Services Address Phone Fax Patient Preferred    [facility]  Home Health Services [address] [address] [phone] --                Discharged on 8/4/2022 Admission date: 7/27/2022 - Discharge disposition: Home-Health Care Svc    Dialysis/Infusion     Service Provider Selected Services Address Phone Fax Patient Preferred    [facility] [address]  Dialysis [address] [address] [phone] --          Home Medical Care     Service Provider Selected Services Address Phone Fax Patient Preferred    [facility]  Home Health Services [facility] [address] [address] [phone] --                    Transportation Services  Private: Car    Final Discharge Disposition Code: 06 - home with home health care

## 2022-10-07 NOTE — OUTREACH NOTE
Prep Survey    Flowsheet Row Responses   [facility] facility patient discharged from? [place]   Is LACE score < 7 ? No   Emergency Room discharge w/ pulse ox? No   Eligibility Readm Mgmt   Discharge diagnosis Acute UTI Sepsis without acute organ dysfunctionChronic systolic CHF    Does the patient have one of the following disease processes/diagnoses(primary or secondary)? Sepsis   Does the patient have Home health ordered? Yes   What is the Home health agency?   [facility]   Is there a DME ordered? Yes   What DME was ordered? [facility] - wheelchair   Prep survey completed? Yes          JOSELYN LUCERO - Registered Nurse

## 2022-10-11 ENCOUNTER — READMISSION MANAGEMENT (OUTPATIENT)
Dept: CALL CENTER | Facility: HOSPITAL | Age: 57
End: 2022-10-11

## 2022-10-11 NOTE — OUTREACH NOTE
Sepsis Week 1 Survey    Flowsheet Row Responses   [facility] patient discharged from? [place]   Does the patient have one of the following disease processes/diagnoses(primary or secondary)? Sepsis   Week 1 attempt successful? Yes   Call start time 1415   Call end time 1424   Discharge diagnosis Acute UTI Sepsis without acute organ dysfunctionChronic systolic CHF    Meds reviewed with patient/caregiver? Yes   Is the patient having any side effects they believe may be caused by any medication additions or changes? No   Does the patient have all medications related to this admission filled (includes all antibiotics, inhalers, nebulizers,steroids,etc.) Yes   Is the patient taking all medications as directed (includes completed medication regime)? Yes   Does the patient have a primary care provider?  Yes   Does the patient have an appointment with their PCP within 7 days of discharge? Yes   Has the patient kept scheduled appointments due by today? N/A   What is the Home health agency?   [facility]    Has home health visited the patient within 72 hours of discharge? Call prior to 72 hours   What DME was ordered? [facility] - wheelchair   Has all DME been delivered? Yes   Psychosocial issues? No   Did the patient receive a copy of their discharge instructions? Yes   Nursing interventions Reviewed instructions with patient   What is the patient's perception of their health status since discharge? Worsening   Nursing interventions Nurse provided patient education   Is the patient/caregiver able to teach back TIME? T emperature - higher or lower than normal, I nfection - may have signs and symptoms of an infection, M ental Decline - confused, sleepy, difficult to arouse, E xtremely Ill - severe pain, discomfort, shortness of breath   Nursing interventions Nurse provided reassurance to patient   Is patient/caregiver able to teach back steps to recovery at home? Set small, achievable goals for return to  baseline health, Rest and regain strength, Exercise as tolerated, Make a list of questions for PCP appoinment   Is the patient/caregiver able to teach back signs and symptoms of worsening condition: Edema, Shortness of breath/rapid respiratory rate, Fever   If the patient is a current smoker, are they able to teach back resources for cessation? Not a smoker   Is the patient/caregiver able to teach back the hierarchy of who to call/visit for symptoms/problems? PCP, Specialist, Home health nurse, Urgent Care, ED, 911 Yes   Additional teach back comments She will reach out to  to get them started. States she is not feeling well at all but slightly better than yesterday.   Week 1 call completed? Yes   Wrap up additional comments Improving.          LIZZY JUNG - Registered Nurse

## 2022-10-12 ENCOUNTER — HOSPITAL ENCOUNTER (OUTPATIENT)
Facility: HOSPITAL | Age: 57
LOS: 5 days | Discharge: HOME HEALTH | End: 2022-10-17
Attending: EMERGENCY MEDICINE | Admitting: STUDENT IN AN ORGANIZED HEALTH CARE EDUCATION/TRAINING PROGRAM

## 2022-10-12 ENCOUNTER — READMISSION MANAGEMENT (OUTPATIENT)
Dept: CALL CENTER | Facility: HOSPITAL | Age: 57
End: 2022-10-12

## 2022-10-12 ENCOUNTER — APPOINTMENT (OUTPATIENT)
Dept: CT IMAGING | Facility: HOSPITAL | Age: 57
End: 2022-10-12

## 2022-10-12 ENCOUNTER — APPOINTMENT (OUTPATIENT)
Dept: GENERAL RADIOLOGY | Facility: HOSPITAL | Age: 57
End: 2022-10-12

## 2022-10-12 DIAGNOSIS — N18.6 ESRD NEEDING DIALYSIS: ICD-10-CM

## 2022-10-12 DIAGNOSIS — E87.1 HYPONATREMIA: ICD-10-CM

## 2022-10-12 DIAGNOSIS — N18.6 ESRD (END STAGE RENAL DISEASE): ICD-10-CM

## 2022-10-12 DIAGNOSIS — R11.2 NAUSEA AND VOMITING, UNSPECIFIED VOMITING TYPE: ICD-10-CM

## 2022-10-12 DIAGNOSIS — E87.5 HYPERKALEMIA: Primary | ICD-10-CM

## 2022-10-12 DIAGNOSIS — E11.65 UNCONTROLLED TYPE 2 DIABETES MELLITUS WITH HYPERGLYCEMIA: ICD-10-CM

## 2022-10-12 DIAGNOSIS — Z86.39 H/O: HYPOTHYROIDISM: ICD-10-CM

## 2022-10-12 DIAGNOSIS — Z99.2 ESRD NEEDING DIALYSIS: ICD-10-CM

## 2022-10-12 LAB
ALBUMIN SERPL-MCNC: 3.3 G/DL (ref 3.5–5.2)
ALBUMIN/GLOB SERPL: 1.2 G/DL
ALP SERPL-CCNC: 300 U/L (ref 39–117)
ALT SERPL W P-5'-P-CCNC: 26 U/L (ref 1–33)
ANION GAP SERPL CALCULATED.3IONS-SCNC: 19.1 MMOL/L (ref 5–15)
AST SERPL-CCNC: 23 U/L (ref 1–32)
BASOPHILS # BLD AUTO: 0.05 10*3/MM3 (ref 0–0.2)
BASOPHILS NFR BLD AUTO: 0.5 % (ref 0–1.5)
BILIRUB SERPL-MCNC: 0.4 MG/DL (ref 0–1.2)
BUN SERPL-MCNC: 44 MG/DL (ref 6–20)
BUN/CREAT SERPL: 8.9 (ref 7–25)
CALCIUM SPEC-SCNC: 8.5 MG/DL (ref 8.6–10.5)
CHLORIDE SERPL-SCNC: 90 MMOL/L (ref 98–107)
CO2 SERPL-SCNC: 19.9 MMOL/L (ref 22–29)
CREAT SERPL-MCNC: 4.93 MG/DL (ref 0.57–1)
DEPRECATED RDW RBC AUTO: 47.7 FL (ref 37–54)
EGFRCR SERPLBLD CKD-EPI 2021: 9.8 ML/MIN/1.73
EOSINOPHIL # BLD AUTO: 0.01 10*3/MM3 (ref 0–0.4)
EOSINOPHIL NFR BLD AUTO: 0.1 % (ref 0.3–6.2)
ERYTHROCYTE [DISTWIDTH] IN BLOOD BY AUTOMATED COUNT: 14.8 % (ref 12.3–15.4)
GLOBULIN UR ELPH-MCNC: 2.8 GM/DL
GLUCOSE BLDC GLUCOMTR-MCNC: 305 MG/DL (ref 70–130)
GLUCOSE BLDC GLUCOMTR-MCNC: 394 MG/DL (ref 70–130)
GLUCOSE SERPL-MCNC: 441 MG/DL (ref 65–99)
HCT VFR BLD AUTO: 43.2 % (ref 34–46.6)
HGB BLD-MCNC: 13.2 G/DL (ref 12–15.9)
IMM GRANULOCYTES # BLD AUTO: 0.05 10*3/MM3 (ref 0–0.05)
IMM GRANULOCYTES NFR BLD AUTO: 0.5 % (ref 0–0.5)
LIPASE SERPL-CCNC: 213 U/L (ref 13–60)
LYMPHOCYTES # BLD AUTO: 1.09 10*3/MM3 (ref 0.7–3.1)
LYMPHOCYTES NFR BLD AUTO: 11.9 % (ref 19.6–45.3)
MCH RBC QN AUTO: 27.2 PG (ref 26.6–33)
MCHC RBC AUTO-ENTMCNC: 30.6 G/DL (ref 31.5–35.7)
MCV RBC AUTO: 89.1 FL (ref 79–97)
MONOCYTES # BLD AUTO: 0.5 10*3/MM3 (ref 0.1–0.9)
MONOCYTES NFR BLD AUTO: 5.5 % (ref 5–12)
NEUTROPHILS NFR BLD AUTO: 7.46 10*3/MM3 (ref 1.7–7)
NEUTROPHILS NFR BLD AUTO: 81.5 % (ref 42.7–76)
NRBC BLD AUTO-RTO: 0 /100 WBC (ref 0–0.2)
PLATELET # BLD AUTO: 234 10*3/MM3 (ref 140–450)
PMV BLD AUTO: 10.2 FL (ref 6–12)
POTASSIUM SERPL-SCNC: 6.7 MMOL/L (ref 3.5–5.2)
PROT SERPL-MCNC: 6.1 G/DL (ref 6–8.5)
QT INTERVAL: 335 MS
RBC # BLD AUTO: 4.85 10*6/MM3 (ref 3.77–5.28)
SODIUM SERPL-SCNC: 129 MMOL/L (ref 136–145)
TROPONIN T SERPL-MCNC: 0.19 NG/ML (ref 0–0.03)
WBC NRBC COR # BLD: 9.16 10*3/MM3 (ref 3.4–10.8)

## 2022-10-12 PROCEDURE — G0378 HOSPITAL OBSERVATION PER HR: HCPCS

## 2022-10-12 PROCEDURE — 82962 GLUCOSE BLOOD TEST: CPT

## 2022-10-12 PROCEDURE — 93010 ELECTROCARDIOGRAM REPORT: CPT | Performed by: INTERNAL MEDICINE

## 2022-10-12 PROCEDURE — 80053 COMPREHEN METABOLIC PANEL: CPT | Performed by: PHYSICIAN ASSISTANT

## 2022-10-12 PROCEDURE — 96375 TX/PRO/DX INJ NEW DRUG ADDON: CPT

## 2022-10-12 PROCEDURE — 83735 ASSAY OF MAGNESIUM: CPT | Performed by: STUDENT IN AN ORGANIZED HEALTH CARE EDUCATION/TRAINING PROGRAM

## 2022-10-12 PROCEDURE — 96374 THER/PROPH/DIAG INJ IV PUSH: CPT

## 2022-10-12 PROCEDURE — 84484 ASSAY OF TROPONIN QUANT: CPT | Performed by: PHYSICIAN ASSISTANT

## 2022-10-12 PROCEDURE — 25010000002 METOCLOPRAMIDE PER 10 MG: Performed by: PHYSICIAN ASSISTANT

## 2022-10-12 PROCEDURE — 84484 ASSAY OF TROPONIN QUANT: CPT | Performed by: STUDENT IN AN ORGANIZED HEALTH CARE EDUCATION/TRAINING PROGRAM

## 2022-10-12 PROCEDURE — 85025 COMPLETE CBC W/AUTO DIFF WBC: CPT | Performed by: PHYSICIAN ASSISTANT

## 2022-10-12 PROCEDURE — 93005 ELECTROCARDIOGRAM TRACING: CPT | Performed by: PHYSICIAN ASSISTANT

## 2022-10-12 PROCEDURE — 25010000002 DIPHENHYDRAMINE PER 50 MG: Performed by: PHYSICIAN ASSISTANT

## 2022-10-12 PROCEDURE — 36415 COLL VENOUS BLD VENIPUNCTURE: CPT | Performed by: STUDENT IN AN ORGANIZED HEALTH CARE EDUCATION/TRAINING PROGRAM

## 2022-10-12 PROCEDURE — 83690 ASSAY OF LIPASE: CPT | Performed by: PHYSICIAN ASSISTANT

## 2022-10-12 PROCEDURE — P9612 CATHETERIZE FOR URINE SPEC: HCPCS

## 2022-10-12 PROCEDURE — 63710000001 INSULIN REGULAR HUMAN PER 5 UNITS: Performed by: PHYSICIAN ASSISTANT

## 2022-10-12 PROCEDURE — 71045 X-RAY EXAM CHEST 1 VIEW: CPT

## 2022-10-12 PROCEDURE — 25010000002 CALCIUM GLUCONATE-NACL 1-0.675 GM/50ML-% SOLUTION: Performed by: PHYSICIAN ASSISTANT

## 2022-10-12 PROCEDURE — 74176 CT ABD & PELVIS W/O CONTRAST: CPT

## 2022-10-12 PROCEDURE — 99285 EMERGENCY DEPT VISIT HI MDM: CPT

## 2022-10-12 RX ORDER — DILTIAZEM HYDROCHLORIDE 180 MG/1
180 CAPSULE, COATED, EXTENDED RELEASE ORAL
Status: DISCONTINUED | OUTPATIENT
Start: 2022-10-13 | End: 2022-10-17

## 2022-10-12 RX ORDER — DILTIAZEM HYDROCHLORIDE 180 MG/1
180 CAPSULE, COATED, EXTENDED RELEASE ORAL ONCE
Status: COMPLETED | OUTPATIENT
Start: 2022-10-12 | End: 2022-10-12

## 2022-10-12 RX ORDER — PANTOPRAZOLE SODIUM 40 MG/1
40 TABLET, DELAYED RELEASE ORAL DAILY
Status: DISCONTINUED | OUTPATIENT
Start: 2022-10-13 | End: 2022-10-17 | Stop reason: HOSPADM

## 2022-10-12 RX ORDER — ASPIRIN 81 MG/1
81 TABLET ORAL DAILY
Status: DISCONTINUED | OUTPATIENT
Start: 2022-10-13 | End: 2022-10-17 | Stop reason: HOSPADM

## 2022-10-12 RX ORDER — INSULIN LISPRO 100 [IU]/ML
0-7 INJECTION, SOLUTION INTRAVENOUS; SUBCUTANEOUS
Status: DISCONTINUED | OUTPATIENT
Start: 2022-10-13 | End: 2022-10-17 | Stop reason: HOSPADM

## 2022-10-12 RX ORDER — LEVOTHYROXINE SODIUM 0.12 MG/1
250 TABLET ORAL
Status: DISCONTINUED | OUTPATIENT
Start: 2022-10-13 | End: 2022-10-17 | Stop reason: HOSPADM

## 2022-10-12 RX ORDER — ALBUMIN (HUMAN) 12.5 G/50ML
12.5 SOLUTION INTRAVENOUS AS NEEDED
Status: ACTIVE | OUTPATIENT
Start: 2022-10-12 | End: 2022-10-13

## 2022-10-12 RX ORDER — CARVEDILOL 25 MG/1
25 TABLET ORAL 2 TIMES DAILY WITH MEALS
Status: DISCONTINUED | OUTPATIENT
Start: 2022-10-13 | End: 2022-10-17 | Stop reason: HOSPADM

## 2022-10-12 RX ORDER — DIPHENHYDRAMINE HYDROCHLORIDE 50 MG/ML
12.5 INJECTION INTRAMUSCULAR; INTRAVENOUS ONCE
Status: COMPLETED | OUTPATIENT
Start: 2022-10-12 | End: 2022-10-12

## 2022-10-12 RX ORDER — METOCLOPRAMIDE HYDROCHLORIDE 5 MG/ML
10 INJECTION INTRAMUSCULAR; INTRAVENOUS ONCE
Status: COMPLETED | OUTPATIENT
Start: 2022-10-12 | End: 2022-10-12

## 2022-10-12 RX ORDER — CARVEDILOL 25 MG/1
25 TABLET ORAL ONCE
Status: COMPLETED | OUTPATIENT
Start: 2022-10-12 | End: 2022-10-12

## 2022-10-12 RX ORDER — FOLIC ACID 1 MG/1
1 TABLET ORAL DAILY
Status: DISCONTINUED | OUTPATIENT
Start: 2022-10-13 | End: 2022-10-17 | Stop reason: HOSPADM

## 2022-10-12 RX ORDER — CALCIUM GLUCONATE 20 MG/ML
1 INJECTION, SOLUTION INTRAVENOUS ONCE
Status: COMPLETED | OUTPATIENT
Start: 2022-10-12 | End: 2022-10-12

## 2022-10-12 RX ORDER — DEXTROSE MONOHYDRATE 25 G/50ML
25 INJECTION, SOLUTION INTRAVENOUS
Status: DISCONTINUED | OUTPATIENT
Start: 2022-10-12 | End: 2022-10-17 | Stop reason: HOSPADM

## 2022-10-12 RX ORDER — NICOTINE POLACRILEX 4 MG
15 LOZENGE BUCCAL
Status: DISCONTINUED | OUTPATIENT
Start: 2022-10-12 | End: 2022-10-17 | Stop reason: HOSPADM

## 2022-10-12 RX ADMIN — METOCLOPRAMIDE 10 MG: 5 INJECTION, SOLUTION INTRAMUSCULAR; INTRAVENOUS at 22:14

## 2022-10-12 RX ADMIN — INSULIN HUMAN 10 UNITS: 100 INJECTION, SOLUTION PARENTERAL at 19:16

## 2022-10-12 RX ADMIN — CALCIUM GLUCONATE 1 G: 20 INJECTION, SOLUTION INTRAVENOUS at 19:16

## 2022-10-12 RX ADMIN — CARVEDILOL 25 MG: 25 TABLET, FILM COATED ORAL at 22:24

## 2022-10-12 RX ADMIN — DIPHENHYDRAMINE HYDROCHLORIDE 12.5 MG: 50 INJECTION, SOLUTION INTRAMUSCULAR; INTRAVENOUS at 22:14

## 2022-10-12 RX ADMIN — SODIUM ZIRCONIUM CYCLOSILICATE 10 G: 10 POWDER, FOR SUSPENSION ORAL at 22:56

## 2022-10-12 RX ADMIN — DILTIAZEM HYDROCHLORIDE 180 MG: 180 CAPSULE, COATED, EXTENDED RELEASE ORAL at 22:24

## 2022-10-12 NOTE — ED TRIAGE NOTES
.Pt masked on arrival, staff masked    Pt from home via ems, called for generalized weakness and nausea since dc from hospital about 2 weeks ago, due for dialysis today at 4pm but didn't go, last dialysis Monday but only got 1 hour

## 2022-10-12 NOTE — ED PROVIDER NOTES
EMERGENCY DEPARTMENT ENCOUNTER    Room Number:  E567/1  Date seen:  10/13/2022  Time seen: 17:25 EDT  PCP: Jane Kyle NP  Historian: patient      HPI:  Chief Complaint: nausea, vomiting, missed dialysis    A complete HPI/ROS/PMH/PSH/SH/FH are unobtainable due to: poor historian    Context: Zaina Martinez is a 56 y.o. female with ESRD on dialysis who presents to the ED for evaluation of nausea and vomiting.  She believes it has been going on for several days but does not know how long.  She states her last dialysis was Monday but she was only able to undergo 1 hour of treatment secondary to nausea.  She has had some episodes of vomiting but she is unsure how often or how many or when it started, denies any hematemesis.  She also denies any abdominal pain fever chills diarrhea chest pain.  She says sometimes she feels short of breath but is not persistent.  She was recently admitted to the hospital, states she had a urinary stent and recently noticed some blood in her urine but is unsure when that started.  Denies any other urinary symptoms.  She does not know who her nephrologist, cardiologist or urologist are.        PAST MEDICAL HISTORY  Active Ambulatory Problems     Diagnosis Date Noted   • Renal insufficiency 03/16/2018   • Hypertensive disorder 08/25/2021   • Hypothyroidism 11/29/2021   • Type 2 diabetes mellitus with kidney complication, with long-term current use of insulin ([facility]) 05/01/2018   • Rheumatoid arthritis ([facility]) 03/30/2021   • Angioedema 11/30/2021   • Esophageal dysmotility 10/15/2021   • Anemia 03/16/2018   • Medically noncompliant 12/01/2021   • Myocardial infarction due to demand ischemia ([facility]) 12/01/2021   • Enteritis 12/03/2021   • PRES (posterior reversible encephalopathy syndrome) 12/05/2021   • Urine retention 12/08/2021   • Klebsiella infection 12/08/2021   • Superficial thrombophlebitis 12/10/2021   • Generalized weakness 12/19/2021   • ESRD (end stage renal disease) ([facility]) 12/20/2021    • CAD (coronary artery disease) 12/20/2021   • Abnormal urinalysis 12/20/2021   • Chronic diastolic CHF (congestive heart failure) ([facility]) 12/25/2021   • Pyelonephritis 01/09/2022   • Calculus of gallbladder with acute on chronic cholecystitis without obstruction 01/09/2022   • Pleural effusion on right 01/09/2022   • Anemia due to chronic kidney disease, on chronic dialysis ([facility]) 01/11/2022   • Abnormal findings on diagnostic imaging of other specified body structures 04/04/2017   • Acute upper respiratory infection 10/31/2018   • Agitation 03/30/2021   • Alkaline phosphatase raised 03/16/2018   • Casts present in urine 03/29/2021   • Cellulitis of toe 11/15/2019   • Hip pain 06/19/2020   • Community acquired pneumonia 02/13/2017   • Depressive disorder 10/31/2018   • Diarrhea of presumed infectious origin 03/30/2021   • Difficult or painful urination 08/11/2020   • Disease due to severe acute respiratory syndrome coronavirus 2 (SARS-CoV-2) 03/30/2021   • Dyspnea 11/15/2019   • Encounter for follow-up examination after completed treatment for conditions other than malignant neoplasm 02/13/2017   • H/O: hypothyroidism 08/25/2021   • Hyperlipidemia 11/30/2018   • Hypomagnesemia 03/30/2021   • Intractable vomiting with nausea 03/29/2021   • Leukocytosis 03/16/2018   • 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   • Need for influenza vaccination 11/30/2018   • Restless legs 11/15/2019   • Noncompliance with treatment 10/31/2018   • Shoulder pain 06/19/2020   • Acute UTI (urinary tract infection) 07/17/2018   • Metabolic encephalopathy 02/24/2022   • Abnormal findings on diagnostic imaging of abdomen 02/24/2022   • Status post cholecystectomy 02/24/2022   • Hyponatremia 02/24/2022   • Leukocytosis 02/24/2022   • Acute metabolic encephalopathy 04/27/2022   • Encephalopathy, toxic 04/28/2022   • Acute CVA (cerebrovascular accident) ([facility]) 05/01/2022   • Intracranial hemorrhage ([facility]) 05/01/2022   • Stroke ([facility]) 05/13/2022   • Abnormal  urinalysis 06/24/2022   • Diabetic muscle infarction (HCC) 07/01/2022   • Other insomnia 07/01/2022   • Altered mental status 07/27/2022   • History of stroke- R MCA s/p TPA with subsequent ICH with debility 07/27/2022   • Heart murmur 07/27/2022   • Bradycardia 07/27/2022   • Left lower lobe pneumonia 07/27/2022   • Metabolic encephalopathy 07/29/2022   • Pericardial effusion 08/20/2022   • Pleural effusion 08/20/2022   • Atrial flutter ([facility]) 09/04/2022   • Chronic systolic CHF (congestive heart failure) ([facility]) 09/04/2022   • Thrombus of venous dialysis catheter ([facility]) 09/04/2022   • Sepsis without acute organ dysfunction ([facility]) 09/27/2022   • Type 2 diabetes mellitus with hyperglycemia, with long-term current use of insulin ([facility]) 10/06/2022   • Acute respiratory failure with hypoxia ([facility]) 10/06/2022   • Acute on chronic systolic CHF (congestive heart failure) ([facility]) 10/06/2022     Resolved Ambulatory Problems     Diagnosis Date Noted   • Hypertensive urgency 11/30/2021   • Acute pulmonary edema ([facility]) 09/03/2022     Past Medical History:   Diagnosis Date   • Acute on chronic diastolic CHF (congestive heart failure) ([facility])    • Diabetes ([facility])    • Disease of thyroid gland    • GERD (gastroesophageal reflux disease)    • Hypertension          PAST SURGICAL HISTORY  Past Surgical History:   Procedure Laterality Date   • CHOLECYSTECTOMY WITH INTRAOPERATIVE CHOLANGIOGRAM N/A 1/10/2022    Procedure: Laparoscopic cholecystectomy with intraoperative cholangiogram;  Surgeon: Ramana Raygoza MD;  Location: [facility];  Service: General;  Laterality: N/A;   • CYSTOSCOPY W/ URETERAL STENT PLACEMENT Left 9/29/2022    Procedure: CYSTOSCOPY URETERAL STENT INSERTION WITH RETROGRADE PYELOGRAM;  Surgeon: Bryant Phan Jr., MD;  Location: [facility];  Service: Urology;  Laterality: Left;   • EYE SURGERY     • HYSTERECTOMY     • INSERTION HEMODIALYSIS CATHETER N/A 12/6/2021    Procedure: HEMODIALYSIS CATHETER INSERTION;   Surgeon: Keli Salazar MD;  [facility];  Service: Vascular;  Laterality: N/A;   • INSERTION HEMODIALYSIS CATHETER N/A 5/3/2022    Procedure: TUNNELED CATHETER PLACEMENT;  Surgeon: Keli Salazar MD;  Location: [facility] OR;  Service: Vascular;  Laterality: N/A;   • MUSCLE BIOPSY Left 6/15/2022    Procedure: Left quadriceps muscle biopsy;  Surgeon: Marques Ma MD;  Location: [facility] OR;  Service: Neurosurgery;  Laterality: Left;         FAMILY HISTORY  Family History   Problem Relation Age of Onset   • Malig Hyperthermia Neg Hx          SOCIAL HISTORY  Social History     Socioeconomic History   • Marital status:      Spouse name: Marv   Tobacco Use   • Smoking status: Never   • Smokeless tobacco: Never   Vaping Use   • Vaping Use: Never used   Substance and Sexual Activity   • Alcohol use: Never   • Drug use: Never   • Sexual activity: Defer         ALLERGIES  Contrast dye, Ibuprofen, and Prochlorperazine        REVIEW OF SYSTEMS  Review of Systems     All systems reviewed and negative except for those discussed in HPI.       PHYSICAL EXAM  ED Triage Vitals [10/12/22 1713]   Temp Heart Rate Resp BP SpO2   98.4 °F (36.9 °C) 101 16 139/96 97 %      Temp src Heart Rate Source Patient Position BP Location FiO2 (%)   -- -- -- -- --         GENERAL: Chronically ill-appearing though not distressed  HENT: atraumatic  EYES: no scleral icterus  CV: Tachycardic, irregularly irregular rate, no lower extremity edema  RESPIRATORY: normal effort, diffusely diminished, no rhonchi wheezes or crackles  ABDOMEN: soft, nontender nondistended  MUSCULOSKELETAL: no deformity  NEURO: alert, moves all extremities, follows commands  SKIN: warm, dry    Vital signs and nursing notes reviewed.          LAB RESULTS  Recent Results (from the past 24 hour(s))   Comprehensive Metabolic Panel    Collection Time: 10/12/22  6:11 PM    Specimen: Blood   Result Value Ref Range    Glucose 441 (C) 65 -  99 mg/dL    BUN 44 (H) 6 - 20 mg/dL    Creatinine 4.93 (H) 0.57 - 1.00 mg/dL    Sodium 129 (L) 136 - 145 mmol/L    Potassium 6.7 (C) 3.5 - 5.2 mmol/L    Chloride 90 (L) 98 - 107 mmol/L    CO2 19.9 (L) 22.0 - 29.0 mmol/L    Calcium 8.5 (L) 8.6 - 10.5 mg/dL    Total Protein 6.1 6.0 - 8.5 g/dL    Albumin 3.30 (L) 3.50 - 5.20 g/dL    ALT (SGPT) 26 1 - 33 U/L    AST (SGOT) 23 1 - 32 U/L    Alkaline Phosphatase 300 (H) 39 - 117 U/L    Total Bilirubin 0.4 0.0 - 1.2 mg/dL    Globulin 2.8 gm/dL    A/G Ratio 1.2 g/dL    BUN/Creatinine Ratio 8.9 7.0 - 25.0    Anion Gap 19.1 (H) 5.0 - 15.0 mmol/L    eGFR 9.8 (L) >60.0 mL/min/1.73   Lipase    Collection Time: 10/12/22  6:11 PM    Specimen: Blood   Result Value Ref Range    Lipase 213 (H) 13 - 60 U/L   Troponin    Collection Time: 10/12/22  6:11 PM    Specimen: Blood   Result Value Ref Range    Troponin T 0.194 (C) 0.000 - 0.030 ng/mL   CBC Auto Differential    Collection Time: 10/12/22  6:11 PM    Specimen: Blood   Result Value Ref Range    WBC 9.16 3.40 - 10.80 10*3/mm3    RBC 4.85 3.77 - 5.28 10*6/mm3    Hemoglobin 13.2 12.0 - 15.9 g/dL    Hematocrit 43.2 34.0 - 46.6 %    MCV 89.1 79.0 - 97.0 fL    MCH 27.2 26.6 - 33.0 pg    MCHC 30.6 (L) 31.5 - 35.7 g/dL    RDW 14.8 12.3 - 15.4 %    RDW-SD 47.7 37.0 - 54.0 fl    MPV 10.2 6.0 - 12.0 fL    Platelets 234 140 - 450 10*3/mm3    Neutrophil % 81.5 (H) 42.7 - 76.0 %    Lymphocyte % 11.9 (L) 19.6 - 45.3 %    Monocyte % 5.5 5.0 - 12.0 %    Eosinophil % 0.1 (L) 0.3 - 6.2 %    Basophil % 0.5 0.0 - 1.5 %    Immature Grans % 0.5 0.0 - 0.5 %    Neutrophils, Absolute 7.46 (H) 1.70 - 7.00 10*3/mm3    Lymphocytes, Absolute 1.09 0.70 - 3.10 10*3/mm3    Monocytes, Absolute 0.50 0.10 - 0.90 10*3/mm3    Eosinophils, Absolute 0.01 0.00 - 0.40 10*3/mm3    Basophils, Absolute 0.05 0.00 - 0.20 10*3/mm3    Immature Grans, Absolute 0.05 0.00 - 0.05 10*3/mm3    nRBC 0.0 0.0 - 0.2 /100 WBC   ECG 12 Lead    Collection Time: 10/12/22  6:50 PM   Result Value  Ref Range    QT Interval 335 ms   POC Glucose Once    Collection Time: 10/12/22  7:55 PM    Specimen: Blood   Result Value Ref Range    Glucose 394 (H) 70 - 130 mg/dL   POC Glucose Once    Collection Time: 10/12/22 11:05 PM    Specimen: Blood   Result Value Ref Range    Glucose 305 (H) 70 - 130 mg/dL       Ordered the above labs and independently reviewed the results.        RADIOLOGY  CT Abdomen Pelvis Without Contrast   Final Result   Diffuse third spacing with pleural effusions and atelectasis   versus pneumonia the lung bases. Left ureteral stent is positioned as   expected. No acute abnormality identified.       This report was finalized on 10/12/2022 8:11 PM by Dr. Adonis Burch M.D.          XR Chest 1 View   Final Result   Volume overload with pulmonary edema and possible left base   atelectasis or pneumonia.       This report was finalized on 10/12/2022 6:40 PM by Dr. Adonis Burch M.D.              I ordered the above noted radiological studies. Reviewed by me and discussed with radiologist.  See dictation for official radiology interpretation.    PROCEDURES  Critical Care  Performed by: Helena Rodrigues PA  Authorized by: Ze Lincoln MD     Critical care provider statement:     Critical care time (minutes):  32    Critical care time was exclusive of:  Separately billable procedures and treating other patients and teaching time    Critical care was necessary to treat or prevent imminent or life-threatening deterioration of the following conditions:  Metabolic crisis    Critical care was time spent personally by me on the following activities:  Development of treatment plan with patient or surrogate, discussions with consultants, evaluation of patient's response to treatment, examination of patient, obtaining history from patient or surrogate, review of old charts, pulse oximetry, re-evaluation of patient's condition, ordering and review of radiographic studies, ordering and review of  laboratory studies and ordering and performing treatments and interventions            MEDICATIONS GIVEN IN ER  Medications   albumin human 25 % IV SOLN 12.5 g (has no administration in time range)   dextrose (GLUTOSE) oral gel 15 g (has no administration in time range)   dextrose (D50W) (25 g/50 mL) IV injection 25 g (has no administration in time range)   glucagon (human recombinant) (GLUCAGEN DIAGNOSTIC) injection 1 mg (has no administration in time range)   insulin lispro (ADMELOG) injection 0-7 Units (has no administration in time range)   apixaban (ELIQUIS) tablet 5 mg (has no administration in time range)   aspirin EC tablet 81 mg (has no administration in time range)   carvedilol (COREG) tablet 25 mg ( Oral Canceled Entry 10/12/22 2327)   dilTIAZem CD (CARDIZEM CD) 24 hr capsule 180 mg (has no administration in time range)   folic acid (FOLVITE) tablet 1 mg (has no administration in time range)   insulin glargine (LANTUS, SEMGLEE) injection 3 Units (has no administration in time range)   levothyroxine (SYNTHROID, LEVOTHROID) tablet 250 mcg (has no administration in time range)   pantoprazole (PROTONIX) EC tablet 40 mg (has no administration in time range)   sertraline (ZOLOFT) tablet 150 mg (has no administration in time range)   insulin regular (humuLIN R,novoLIN R) injection 10 Units (10 Units Intravenous Given 10/12/22 1916)   calcium gluconate 1g/50ml 0.675% NaCl IV SOLN (0 g Intravenous Stopped 10/12/22 1931)   dilTIAZem CD (CARDIZEM CD) 24 hr capsule 180 mg (180 mg Oral Given 10/12/22 2224)   carvedilol (COREG) tablet 25 mg (25 mg Oral Given 10/12/22 2224)   metoclopramide (REGLAN) injection 10 mg (10 mg Intravenous Given 10/12/22 2214)   diphenhydrAMINE (BENADRYL) injection 12.5 mg (12.5 mg Intravenous Given 10/12/22 2214)   sodium zirconium cyclosilicate (LOKELMA) pack 10 g (10 g Oral Given 10/12/22 2256)             PROGRESS AND CONSULTS    Differential diagnosis includes but is not limited to:  -  hepatobiliary pathology such as cholecystitis, cholangitis, and symptomatic cholelithiasis  - Pancreatitis  - Dyspepsia  - Small bowel obstruction  - Appendicitis  - Diverticulitis  - UTI including pyelonephritis  - Ureteral stone  - Zoster  - Colitis, including infectious and ischemic  - Atypical ACS      ED Course as of 10/13/22 0033   Wed Oct 12, 2022   1841 Medical chart reviewed.  Patient admitted 9/26/2022 until 10/6/2022.  Noted history of atrial flutter amongst numerous other diagnoses.  Presented with confusion nausea and vomiting.  Found to have acute on chronic heart failure and sepsis secondary to acute UTI.  Negative blood cultures, treated with Rocephin and fluconazole based on urine culture.  She underwent cystoscopy and left ureteral stent insertion on 9/29/2022.  Volume overload improved with hemodialysis.  Had intermittently rate uncontrolled atrial flutter and meds adjusted while hospitalized include carvedilol diltiazem and Eliquis. [KA]   1841 WBC: 9.16 [KA]   1841 Hemoglobin: 13.2 [KA]   1849 Lipase(!): 213 [KA]   1852 Potassium(!!): 6.7  Hyperkalemia meds ordered. [KA]   1852 Glucose(!!): 441 [KA]   1852 Troponin T(!!): 0.194 [KA]   1854 My interpretation of the EKG performed at 6:50 PM is atrial flutter rate 115 left axis deviation normal intervals no ST elevation, T wave morphology, no peaked T waves.  Not significantly changed from prior on 9/27/2022. [KA]   1922 I discussed the patient with Dr. Powell, nephrology.  He will place emergent dialysis orders, he is agreeable with this current treatment for hyperkalemia. [KA]   2016 WBC: 9.16 [KA]   2043 I discussed patient with Dr. Bull, hospitalist for [facility].  We discussed patient's history presentation labs and imaging.  Dr. Powell has placed dialysis orders, he would like the patient to go to dialysis before coming to the floor due to her hyperkalemia.  Atelectasis versus pneumonia on CT of the abdomen, favor atelectasis secondary to  afebrile, normal white blood cell count.  He is agreeable with no antibiotics at this time and will continue to monitor. [KA]      ED Course User Index  [KA] Helena Rodrigues PA             Patient was placed in face mask in first look. Patient was wearing facemask each time I entered the room and throughout our encounter. I wore protective equipment throughout this patient encounter including a face mask, eye shield and gloves. Hand hygiene was performed before donning protective equipment and after removal when leaving the room.        DIAGNOSIS  Final diagnoses:   Hyperkalemia   Nausea and vomiting, unspecified vomiting type   ESRD needing dialysis (HCC)   Uncontrolled type 2 diabetes mellitus with hyperglycemia (HCC)   Hyponatremia         Latest Documented Vital Signs:  As of 00:33 EDT  BP- (!) 165/112 HR- 103 Temp- 98.6 °F (37 °C) (Oral) O2 sat- 90%       Helena Rodrigues PA  10/13/22 0034

## 2022-10-13 LAB
ANION GAP SERPL CALCULATED.3IONS-SCNC: 10 MMOL/L (ref 5–15)
ANION GAP SERPL CALCULATED.3IONS-SCNC: 15.5 MMOL/L (ref 5–15)
BACTERIA UR QL AUTO: ABNORMAL /HPF
BASOPHILS # BLD AUTO: 0.05 10*3/MM3 (ref 0–0.2)
BASOPHILS NFR BLD AUTO: 0.6 % (ref 0–1.5)
BILIRUB UR QL STRIP: ABNORMAL
BUN SERPL-MCNC: 19 MG/DL (ref 6–20)
BUN SERPL-MCNC: 45 MG/DL (ref 6–20)
BUN/CREAT SERPL: 7.8 (ref 7–25)
BUN/CREAT SERPL: 9.5 (ref 7–25)
CALCIUM SPEC-SCNC: 8.1 MG/DL (ref 8.6–10.5)
CALCIUM SPEC-SCNC: 8.5 MG/DL (ref 8.6–10.5)
CHLORIDE SERPL-SCNC: 91 MMOL/L (ref 98–107)
CHLORIDE SERPL-SCNC: 94 MMOL/L (ref 98–107)
CLARITY UR: ABNORMAL
CO2 SERPL-SCNC: 18.5 MMOL/L (ref 22–29)
CO2 SERPL-SCNC: 26 MMOL/L (ref 22–29)
COLOR UR: ABNORMAL
CREAT SERPL-MCNC: 2.44 MG/DL (ref 0.57–1)
CREAT SERPL-MCNC: 4.76 MG/DL (ref 0.57–1)
DEPRECATED RDW RBC AUTO: 46.4 FL (ref 37–54)
EGFRCR SERPLBLD CKD-EPI 2021: 10.2 ML/MIN/1.73
EGFRCR SERPLBLD CKD-EPI 2021: 22.7 ML/MIN/1.73
EOSINOPHIL # BLD AUTO: 0.02 10*3/MM3 (ref 0–0.4)
EOSINOPHIL NFR BLD AUTO: 0.2 % (ref 0.3–6.2)
ERYTHROCYTE [DISTWIDTH] IN BLOOD BY AUTOMATED COUNT: 14.8 % (ref 12.3–15.4)
ETHANOL BLD-MCNC: <10 MG/DL (ref 0–10)
ETHANOL UR QL: <0.01 %
GLUCOSE BLDC GLUCOMTR-MCNC: 183 MG/DL (ref 70–130)
GLUCOSE BLDC GLUCOMTR-MCNC: 348 MG/DL (ref 70–130)
GLUCOSE BLDC GLUCOMTR-MCNC: 376 MG/DL (ref 70–130)
GLUCOSE BLDC GLUCOMTR-MCNC: 405 MG/DL (ref 70–130)
GLUCOSE SERPL-MCNC: 290 MG/DL (ref 65–99)
GLUCOSE SERPL-MCNC: 344 MG/DL (ref 65–99)
GLUCOSE UR STRIP-MCNC: ABNORMAL MG/DL
HCT VFR BLD AUTO: 40.1 % (ref 34–46.6)
HGB BLD-MCNC: 12.2 G/DL (ref 12–15.9)
HGB UR QL STRIP.AUTO: ABNORMAL
HYALINE CASTS UR QL AUTO: ABNORMAL /LPF
IMM GRANULOCYTES # BLD AUTO: 0.04 10*3/MM3 (ref 0–0.05)
IMM GRANULOCYTES NFR BLD AUTO: 0.5 % (ref 0–0.5)
KETONES UR QL STRIP: NEGATIVE
LEUKOCYTE ESTERASE UR QL STRIP.AUTO: ABNORMAL
LYMPHOCYTES # BLD AUTO: 0.88 10*3/MM3 (ref 0.7–3.1)
LYMPHOCYTES NFR BLD AUTO: 10.8 % (ref 19.6–45.3)
MAGNESIUM SERPL-MCNC: 2 MG/DL (ref 1.6–2.6)
MCH RBC QN AUTO: 26.5 PG (ref 26.6–33)
MCHC RBC AUTO-ENTMCNC: 30.4 G/DL (ref 31.5–35.7)
MCV RBC AUTO: 87.2 FL (ref 79–97)
MONOCYTES # BLD AUTO: 0.53 10*3/MM3 (ref 0.1–0.9)
MONOCYTES NFR BLD AUTO: 6.5 % (ref 5–12)
NEUTROPHILS NFR BLD AUTO: 6.63 10*3/MM3 (ref 1.7–7)
NEUTROPHILS NFR BLD AUTO: 81.4 % (ref 42.7–76)
NITRITE UR QL STRIP: POSITIVE
NRBC BLD AUTO-RTO: 0 /100 WBC (ref 0–0.2)
PH UR STRIP.AUTO: 6.5 [PH] (ref 5–8)
PLATELET # BLD AUTO: 219 10*3/MM3 (ref 140–450)
PMV BLD AUTO: 10.2 FL (ref 6–12)
POTASSIUM SERPL-SCNC: 4.2 MMOL/L (ref 3.5–5.2)
POTASSIUM SERPL-SCNC: 6.8 MMOL/L (ref 3.5–5.2)
PROT UR QL STRIP: ABNORMAL
RBC # BLD AUTO: 4.6 10*6/MM3 (ref 3.77–5.28)
RBC # UR STRIP: ABNORMAL /HPF
REF LAB TEST METHOD: ABNORMAL
SODIUM SERPL-SCNC: 125 MMOL/L (ref 136–145)
SODIUM SERPL-SCNC: 130 MMOL/L (ref 136–145)
SP GR UR STRIP: 1.03 (ref 1–1.03)
SQUAMOUS #/AREA URNS HPF: ABNORMAL /HPF
TROPONIN T SERPL-MCNC: 0.17 NG/ML (ref 0–0.03)
UROBILINOGEN UR QL STRIP: ABNORMAL
WBC # UR STRIP: ABNORMAL /HPF
WBC NRBC COR # BLD: 8.15 10*3/MM3 (ref 3.4–10.8)

## 2022-10-13 PROCEDURE — 63710000001 CARVEDILOL 25 MG TABLET: Performed by: STUDENT IN AN ORGANIZED HEALTH CARE EDUCATION/TRAINING PROGRAM

## 2022-10-13 PROCEDURE — 82077 ASSAY SPEC XCP UR&BREATH IA: CPT | Performed by: INTERNAL MEDICINE

## 2022-10-13 PROCEDURE — 63710000001 INSULIN LISPRO (HUMAN) PER 5 UNITS: Performed by: STUDENT IN AN ORGANIZED HEALTH CARE EDUCATION/TRAINING PROGRAM

## 2022-10-13 PROCEDURE — 80048 BASIC METABOLIC PNL TOTAL CA: CPT | Performed by: HOSPITALIST

## 2022-10-13 PROCEDURE — 63710000001 SERTRALINE 100 MG TABLET: Performed by: STUDENT IN AN ORGANIZED HEALTH CARE EDUCATION/TRAINING PROGRAM

## 2022-10-13 PROCEDURE — 97110 THERAPEUTIC EXERCISES: CPT | Performed by: PHYSICAL THERAPIST

## 2022-10-13 PROCEDURE — A9270 NON-COVERED ITEM OR SERVICE: HCPCS | Performed by: STUDENT IN AN ORGANIZED HEALTH CARE EDUCATION/TRAINING PROGRAM

## 2022-10-13 PROCEDURE — 63710000001 INSULIN GLARGINE-YFGN 100 UNIT/ML SOLUTION: Performed by: STUDENT IN AN ORGANIZED HEALTH CARE EDUCATION/TRAINING PROGRAM

## 2022-10-13 PROCEDURE — 63710000001 INSULIN REGULAR HUMAN PER 5 UNITS: Performed by: NURSE PRACTITIONER

## 2022-10-13 PROCEDURE — G0257 UNSCHED DIALYSIS ESRD PT HOS: HCPCS

## 2022-10-13 PROCEDURE — 63710000001 DILTIAZEM CD 180 MG CAPSULE SUSTAINED-RELEASE 24 HR: Performed by: STUDENT IN AN ORGANIZED HEALTH CARE EDUCATION/TRAINING PROGRAM

## 2022-10-13 PROCEDURE — 63710000001 FOLIC ACID 1 MG TABLET: Performed by: STUDENT IN AN ORGANIZED HEALTH CARE EDUCATION/TRAINING PROGRAM

## 2022-10-13 PROCEDURE — 63710000001 PANTOPRAZOLE 40 MG TABLET DELAYED-RELEASE: Performed by: STUDENT IN AN ORGANIZED HEALTH CARE EDUCATION/TRAINING PROGRAM

## 2022-10-13 PROCEDURE — 63710000001 ASPIRIN 81 MG TABLET DELAYED-RELEASE: Performed by: STUDENT IN AN ORGANIZED HEALTH CARE EDUCATION/TRAINING PROGRAM

## 2022-10-13 PROCEDURE — 63710000001 LEVOTHYROXINE 125 MCG TABLET: Performed by: STUDENT IN AN ORGANIZED HEALTH CARE EDUCATION/TRAINING PROGRAM

## 2022-10-13 PROCEDURE — 25010000002 HEPARIN (PORCINE) PER 1000 UNITS: Performed by: INTERNAL MEDICINE

## 2022-10-13 PROCEDURE — 97162 PT EVAL MOD COMPLEX 30 MIN: CPT | Performed by: PHYSICAL THERAPIST

## 2022-10-13 PROCEDURE — 82962 GLUCOSE BLOOD TEST: CPT

## 2022-10-13 PROCEDURE — 63710000001 ROPINIROLE 1 MG TABLET: Performed by: NURSE PRACTITIONER

## 2022-10-13 PROCEDURE — 63710000001 SERTRALINE 50 MG TABLET: Performed by: STUDENT IN AN ORGANIZED HEALTH CARE EDUCATION/TRAINING PROGRAM

## 2022-10-13 PROCEDURE — 81001 URINALYSIS AUTO W/SCOPE: CPT | Performed by: PHYSICIAN ASSISTANT

## 2022-10-13 PROCEDURE — 85025 COMPLETE CBC W/AUTO DIFF WBC: CPT | Performed by: HOSPITALIST

## 2022-10-13 PROCEDURE — A9270 NON-COVERED ITEM OR SERVICE: HCPCS | Performed by: NURSE PRACTITIONER

## 2022-10-13 PROCEDURE — G0378 HOSPITAL OBSERVATION PER HR: HCPCS

## 2022-10-13 PROCEDURE — 87086 URINE CULTURE/COLONY COUNT: CPT | Performed by: PHYSICIAN ASSISTANT

## 2022-10-13 RX ORDER — ONDANSETRON 2 MG/ML
4 INJECTION INTRAMUSCULAR; INTRAVENOUS EVERY 6 HOURS PRN
Status: DISCONTINUED | OUTPATIENT
Start: 2022-10-13 | End: 2022-10-17 | Stop reason: HOSPADM

## 2022-10-13 RX ORDER — ALBUMIN (HUMAN) 12.5 G/50ML
12.5 SOLUTION INTRAVENOUS AS NEEDED
Status: ACTIVE | OUTPATIENT
Start: 2022-10-14 | End: 2022-10-14

## 2022-10-13 RX ORDER — ROPINIROLE 1 MG/1
1 TABLET, FILM COATED ORAL NIGHTLY
Status: DISCONTINUED | OUTPATIENT
Start: 2022-10-13 | End: 2022-10-17 | Stop reason: HOSPADM

## 2022-10-13 RX ORDER — HEPARIN SODIUM 1000 [USP'U]/ML
3800 INJECTION, SOLUTION INTRAVENOUS; SUBCUTANEOUS AS NEEDED
Status: DISCONTINUED | OUTPATIENT
Start: 2022-10-13 | End: 2022-10-17 | Stop reason: HOSPADM

## 2022-10-13 RX ADMIN — INSULIN GLARGINE-YFGN 3 UNITS: 100 INJECTION, SOLUTION SUBCUTANEOUS at 00:30

## 2022-10-13 RX ADMIN — INSULIN LISPRO 2 UNITS: 100 INJECTION, SOLUTION INTRAVENOUS; SUBCUTANEOUS at 09:01

## 2022-10-13 RX ADMIN — CARVEDILOL 25 MG: 25 TABLET, FILM COATED ORAL at 09:01

## 2022-10-13 RX ADMIN — HEPARIN SODIUM 3800 UNITS: 1000 INJECTION INTRAVENOUS; SUBCUTANEOUS at 03:54

## 2022-10-13 RX ADMIN — FOLIC ACID 1 MG: 1 TABLET ORAL at 09:01

## 2022-10-13 RX ADMIN — APIXABAN 5 MG: 5 TABLET, FILM COATED ORAL at 00:30

## 2022-10-13 RX ADMIN — INSULIN LISPRO 5 UNITS: 100 INJECTION, SOLUTION INTRAVENOUS; SUBCUTANEOUS at 16:48

## 2022-10-13 RX ADMIN — DILTIAZEM HYDROCHLORIDE 30 MG: 30 TABLET, FILM COATED ORAL at 14:29

## 2022-10-13 RX ADMIN — PANTOPRAZOLE SODIUM 40 MG: 40 TABLET, DELAYED RELEASE ORAL at 09:01

## 2022-10-13 RX ADMIN — ASPIRIN 81 MG: 81 TABLET, FILM COATED ORAL at 09:00

## 2022-10-13 RX ADMIN — INSULIN GLARGINE-YFGN 10 UNITS: 100 INJECTION, SOLUTION SUBCUTANEOUS at 21:31

## 2022-10-13 RX ADMIN — APIXABAN 5 MG: 5 TABLET, FILM COATED ORAL at 11:58

## 2022-10-13 RX ADMIN — ROPINIROLE 1 MG: 1 TABLET, FILM COATED ORAL at 21:30

## 2022-10-13 RX ADMIN — INSULIN HUMAN 7 UNITS: 100 INJECTION, SOLUTION PARENTERAL at 21:30

## 2022-10-13 RX ADMIN — SERTRALINE 150 MG: 100 TABLET, FILM COATED ORAL at 09:00

## 2022-10-13 RX ADMIN — INSULIN LISPRO 2 UNITS: 100 INJECTION, SOLUTION INTRAVENOUS; SUBCUTANEOUS at 11:58

## 2022-10-13 RX ADMIN — APIXABAN 5 MG: 5 TABLET, FILM COATED ORAL at 21:30

## 2022-10-13 RX ADMIN — LEVOTHYROXINE SODIUM 250 MCG: 0.12 TABLET ORAL at 06:11

## 2022-10-13 RX ADMIN — DILTIAZEM HYDROCHLORIDE 180 MG: 180 CAPSULE, EXTENDED RELEASE ORAL at 09:01

## 2022-10-13 RX ADMIN — CARVEDILOL 25 MG: 25 TABLET, FILM COATED ORAL at 18:54

## 2022-10-13 RX ADMIN — ROPINIROLE 1 MG: 1 TABLET, FILM COATED ORAL at 03:03

## 2022-10-13 NOTE — OUTREACH NOTE
Sepsis Week 2 Survey    Flowsheet Row Responses   [facility] patient discharged from? [place]   Does the patient have one of the following disease processes/diagnoses(primary or secondary)? Sepsis   Week 2 attempt successful? No   Unsuccessful attempts Attempt 1   Revoke Readmitted          MAGALI REYNAGA - Registered Nurse

## 2022-10-13 NOTE — ED NOTES
"[facility] called for STAT Dialysis. Diaysis nurse stated that all nurses were out on stat assignments and would not be able to get to this facility until approx 0000. Dr. Bull was paged and told about time time and stated that \"mid night should be okay\"   "

## 2022-10-13 NOTE — PLAN OF CARE
Goal Outcome Evaluation:  Pt admitted from ER, STAT dialysis being done at bedside. A flutter with rates in the 130's at times. BP elevated. Pt missed doses of cardiac meds, given upon arrival from ED. Pt with c/o nausea on arrival to floor, medications given. 02 @ 2lpm. No c/o pain or discomfort. Call light in reach.

## 2022-10-13 NOTE — CONSULTS
[facility] Consult Note      Patient Name: Zaina Martinez  : 1965  MRN: [id number]  Primary Care Physician:  Jane Kyle NP  Referring Physician: Jerson Bull MD  Date of admission: 10/12/2022    Subjective     Reason for Consult:  ESRD    HPI:   Zaina Martinez is a 56 y.o. female with ESRD on HD ([facility]; [facility]; right chest catheter; Dr. Vazquez Villalpando) admitted yesterday for further evaluation of weakness, nausea/vomiting, and only partial treatment 10/10.   Full PMH outlined below.  Initial potassium 6.7 with glucose 441.  Follow-up level 6.8 and 344, respectively.  Urgent HD last night that just concluded a few hours ago.  Post-HD potassium level was 4.2.    · She reports never receiving any information on a low-potassium diet (?)  · Breathing is comfortable on 1 L/min; no chest pain; no leg swelling  · Appetite remains poor; N/V subsiding; no diarrhea  · No fever or chills      Review of Systems:   14 point review of systems is otherwise negative except for mentioned above on HPI    Personal History     Past Medical History:   Diagnosis Date   • Acute CVA (cerebrovascular accident) (HCC) 2022   • Acute on chronic diastolic CHF (congestive heart failure) (HCC)    • CAD (coronary artery disease) 2021   • Diabetes ([facility])    • Disease of thyroid gland    • GERD (gastroesophageal reflux disease)    • Hyperlipidemia 2018   • Hypertension    • Rheumatoid arthritis (HCC)        Past Surgical History:   Procedure Laterality Date   • CHOLECYSTECTOMY WITH INTRAOPERATIVE CHOLANGIOGRAM N/A 1/10/2022    Procedure: Laparoscopic cholecystectomy with intraoperative cholangiogram;  Surgeon: Ramana Raygoza MD;  Location: [facility];  Service: General;  Laterality: N/A;   • CYSTOSCOPY W/ URETERAL STENT PLACEMENT Left 2022    Procedure: CYSTOSCOPY URETERAL STENT INSERTION WITH RETROGRADE PYELOGRAM;  Surgeon: Bryant Phan Jr., MD;  Location: [facility];   Service: Urology;  Laterality: Left;   • EYE SURGERY     • HYSTERECTOMY     • INSERTION HEMODIALYSIS CATHETER N/A 12/6/2021    Procedure: HEMODIALYSIS CATHETER INSERTION;  Surgeon: Keli Salazar MD;  Location: [facility] 18/19;  Service: Vascular;  Laterality: N/A;   • INSERTION HEMODIALYSIS CATHETER N/A 5/3/2022    Procedure: TUNNELED CATHETER PLACEMENT;  Surgeon: Keli Salazar MD;  Location: [facility];  Service: Vascular;  Laterality: N/A;   • MUSCLE BIOPSY Left 6/15/2022    Procedure: Left quadriceps muscle biopsy;  Surgeon: Marques Ma MD;  Location: [facility];  Service: Neurosurgery;  Laterality: Left;       Family History: family history is not on file.    Social History:  reports that she has never smoked. She has never used smokeless tobacco. She reports that she does not drink alcohol and does not use drugs.    Home Medications:  Prior to Admission medications    Medication Sig Start Date End Date Taking? Authorizing Provider   apixaban (ELIQUIS) 5 MG tablet tablet Take 1 tablet by mouth Every 12 (Twelve) Hours. Indications: Atrial Fibrillation 8/27/22  Yes Lul Reid MD   aspirin 81 MG EC tablet Take 1 tablet by mouth Daily. 7/22/22  Yes Adonis Florentino MD   carvedilol (COREG) 25 MG tablet Take 1 tablet by mouth 2 (Two) Times a Day With Meals. 10/6/22  Yes Jimmy Armenta DO   dilTIAZem CD (CARDIZEM CD) 180 MG 24 hr capsule Take 1 capsule by mouth Daily. 10/7/22  Yes Jimmy Armenta DO   folic acid (FOLVITE) 1 MG tablet Take 1 mg by mouth Daily.   Yes Doyle Murdock MD   Hydrocortisone, Perianal, (ANUSOL-HC) 2.5 % rectal cream Insert  into the rectum 2 (Two) Times a Day. 10/6/22  Yes Jimmy Armenta DO   insulin glargine (LANTUS, SEMGLEE) 100 UNIT/ML injection Inject 3 Units under the skin into the appropriate area as directed Every Night. 9/13/22  Yes Ronnie Baptiste MD   insulin lispro (ADMELOG) 100 UNIT/ML injection Inject 0-14 Units under the skin into  the appropriate area as directed 3 (Three) Times a Day Before Meals.  Patient taking differently: Inject 0-14 Units under the skin into the appropriate area as directed 3 (Three) Times a Day Before Meals. Per sliding Scale 7/22/22  Yes Adonis Florentino MD   levothyroxine (SYNTHROID, LEVOTHROID) 125 MCG tablet Take 2 tablets by mouth Every Morning. 10/6/22  Yes Jimmy Armenta DO   melatonin 3 MG tablet Take 1 tablet by mouth Every Night. 10/6/22  Yes Jimmy Armenta DO   miconazole (MICOTIN) 2 % cream Apply 1 application topically to the appropriate area as directed Every 12 (Twelve) Hours.  Patient taking differently: Apply 1 application topically to the appropriate area as directed Every 12 (Twelve) Hours. Lower Back Rash 7/22/22  Yes Adonis Florentino MD   pantoprazole (PROTONIX) 40 MG EC tablet Take 1 tablet by mouth Daily. 7/1/22  Yes Adonis Florentino MD   polyethylene glycol (MIRALAX) 17 g packet Take 17 g by mouth Daily As Needed (constipation). 7/22/22  Yes Adonis Florentino MD   predniSONE (DELTASONE) 5 MG tablet Two tabs po with breakfast July 26-July 28, 1.5 tabs with breakfast July 29-August 4, then 1 tab daily starting Friday August 5, 2022 and continue on that dose  Patient taking differently: Take 1 tablet by mouth See Admin Instructions. Two tabs po with breakfast July 26-July 28, 1.5 tabs with breakfast July 29-August 4, then 1 tab daily starting Friday August 5, 2022 and continue on that dose 7/25/22  Yes Adonis Florentino MD   rOPINIRole (REQUIP) 1 MG tablet Take 1 tablet by mouth Every Night for 30 days. Take 1 hour before bedtime. 9/13/22 10/13/22 Yes Ronnie Baptiste MD   sertraline (ZOLOFT) 50 MG tablet Take 3 tablets by mouth Daily. 7/2/22  Yes Adonis Florentino MD       Allergies:  Allergies   Allergen Reactions   • Contrast Dye Confusion   • Ibuprofen Other (See Comments)     Kidney disease   • Prochlorperazine Other (See Comments)     Dystonic reaction             Objective     Vitals:   Temp:  [97.5 °F (36.4 °C)-98.6 °F (37 °C)] 97.5 °F (36.4 °C)  Heart Rate:  [] 126  Resp:  [16] 16  BP: (124-165)/() 141/101  Flow (L/min):  [2] 2    Intake/Output Summary (Last 24 hours) at 10/13/2022 1044  Last data filed at 10/13/2022 0634  Gross per 24 hour   Intake 530 ml   Output --   Net 530 ml       Physical Exam:   Constitutional: Awake, alert, NAD, chronically ill  HEENT: Sclera anicteric, no conjunctival injection  Neck: Supple, no carotid bruit, trachea at midline, no JVD  Resp: No BS in lower bases, few crackles midfields, nonlabored on 1 L/min  Vascular: Right chest TDC  Cardiovascular: Irregularly irregular, tachycardic, 2/6M, no rub  Gastrointestinal: BS +, soft, nontender, +distended  : No palpable bladder  Musculoskeletal:  No significant edema, + clubbing  Psychiatric: Flat affect, depressed mood, cooperative, oriented  Neurologic: moving all extremities, normal speech and mental status  Skin: Warm and dry      Scheduled Meds:     apixaban, 5 mg, Oral, Q12H  aspirin, 81 mg, Oral, Daily  carvedilol, 25 mg, Oral, BID With Meals  dilTIAZem CD, 180 mg, Oral, Q24H  folic acid, 1 mg, Oral, Daily  insulin glargine, 3 Units, Subcutaneous, Nightly  insulin lispro, 0-7 Units, Subcutaneous, TID AC  levothyroxine, 250 mcg, Oral, Q AM  pantoprazole, 40 mg, Oral, Daily  rOPINIRole, 1 mg, Oral, Nightly  sertraline, 150 mg, Oral, Daily      IV Meds:        Results Reviewed:   I have personally reviewed the results from the time of this admission to 10/13/2022 10:44 EDT     Lab Results   Component Value Date    GLUCOSE 290 (H) 10/13/2022    CALCIUM 8.1 (L) 10/13/2022     (L) 10/13/2022    K 4.2 10/13/2022    CO2 26.0 10/13/2022    CL 94 (L) 10/13/2022    BUN 19 10/13/2022    CREATININE 2.44 (H) 10/13/2022    EGFRIFAFRI  01/13/2022      Comment:      <15 Indicative of kidney failure.    EGFRIFNONA 14 (L) 02/28/2022    BCR 7.8 10/13/2022    ANIONGAP 10.0 10/13/2022       Lab Results   Component Value Date    MG 2.0 10/12/2022    PHOS 4.5 10/05/2022    ALBUMIN 3.30 (L) 10/12/2022           Assessment / Plan     ASSESSMENT:  1.  ESRD: volume excess by exam and imaging; resolved hyperkalemia; low serum sodium level with hypervolemia and hyperglycemia  2.  Volume overload with bilateral pleural effusions, ascites, and anasarca; has chronic small pericardial effusion, with August '22 echo negative for tamponade  3.  Atrial fibrillation/flutter with rapid rate  4.  Hypertension of ESRD, exacerbated by volume overload  5.  History of noncompliance with fluid restriction, potassium restriction, and medications in general  6.  Diabetes mellitus with renal complications; uncontrolled  7.  Anemia of ESRD: hemoglobin above goal  8.  Recent left-sided hydroureteronephrosis with an abrupt transition point in left ureter:  followed by urology    PLAN:  1.  HD again tomorrow (MWF)  2.  Discussed with her importance of potassium restriction and fluid restriction.  She claims never to have received information on low potassium diet despite renal dietitian at her HD unit having spoken to her about this in the past.  Will ask dietitian here to re-educate her  3.  Check ethanol level; was elevated when she was last admitted  4.  Needs better rate control    Thank you for involving us in the care of Zaina Martinez.  Please feel free to call with any questions.    Alejo Lange MD  10/13/22  10:44 EDT    [facility]  [phone]      Much of this encounter note is an electronic transcription/translation of spoken language to printed text. The electronic translation of spoken language may permit erroneous, or at times, nonsensical words or phrases to be inadvertently transcribed; Although I have reviewed the note for such errors, some may still exist.

## 2022-10-13 NOTE — PROGRESS NOTES
"DAILY PROGRESS NOTE  [facility]    Patient Identification:  Name: Zaina Martinez  Age: 56 y.o.  Sex: female  :  1965  MRN: [id number]         Primary Care Physician: Jane Kyle NP      Subjective  Patient has no acute complaints.    Objective:  General Appearance:  Comfortable, well-appearing, in no acute distress and not in pain.    Vital signs: (most recent): Blood pressure 114/83, pulse 81, temperature 98.1 °F (36.7 °C), temperature source Oral, resp. rate 16, height 157.5 cm (62\"), weight 56.6 kg (124 lb 12.5 oz), SpO2 94 %, not currently breastfeeding.    Lungs:  Normal effort and normal respiratory rate.  Breath sounds clear to auscultation.    Heart: Normal rate.  Regular rhythm.    Extremities: There is no dependent edema.    Neurological: Patient is alert and oriented to person, place and time.  (Speech a bit on the slow side.).    Skin:  Warm and dry.                Vital signs in last 24 hours:  Temp:  [97.5 °F (36.4 °C)-98.6 °F (37 °C)] 98.1 °F (36.7 °C)  Heart Rate:  [] 81  Resp:  [16] 16  BP: (106-165)/() 114/83    Intake/Output:    Intake/Output Summary (Last 24 hours) at 10/13/2022 1912  Last data filed at 10/13/2022 1100  Gross per 24 hour   Intake 770 ml   Output --   Net 770 ml         Results from last 7 days   Lab Units 10/13/22  0827 10/12/22  181   WBC 10*3/mm3 8.15 9.16   HEMOGLOBIN g/dL 12.2 13.2   PLATELETS 10*3/mm3 219 234     Results from last 7 days   Lab Units 10/13/22  0827 10/12/22  2351 10/12/22  1811   SODIUM mmol/L 130* 125* 129*   POTASSIUM mmol/L 4.2 6.8* 6.7*   CHLORIDE mmol/L 94* 91* 90*   CO2 mmol/L 26.0 18.5* 19.9*   BUN mg/dL 19 45* 44*   CREATININE mg/dL 2.44* 4.76* 4.93*   GLUCOSE mg/dL 290* 344* 441*   Estimated Creatinine Clearance: 23 mL/min (A) (by C-G formula based on SCr of 2.44 mg/dL (H)).  Results from last 7 days   Lab Units 10/13/22  0827 10/12/22  2351 10/12/22  1811   CALCIUM mg/dL 8.1* 8.5* 8.5*   ALBUMIN g/dL  --   --  " 3.30*   MAGNESIUM mg/dL  --  2.0  --      Results from last 7 days   Lab Units 10/12/22  1811   ALBUMIN g/dL 3.30*   BILIRUBIN mg/dL 0.4   ALK PHOS U/L 300*   AST (SGOT) U/L 23   ALT (SGPT) U/L 26       Assessment:  ESRD on hemodialysis: Dialysis resumed.  Nephrology input appreciated.  Hyperkalemia: Corrected.  Hyponatremia:  Hypoxic respiratory failure:  Likely due to fluid overload.  Resolved.  Atrial flutter:  Resume Coreg and diltiazem.  Resume Eliquis  Type 2 diabetes:   Poor control.  Increase at bedtime Lantus.  Monitor.  Hypothyroid: Extremely poor control with the last TSH being over 44 and that was the best she has been.  Reported on 250 mcg of levothyroxine daily.  Suspect noncompliance.  Recheck TSH.  Generalized Weakness: Multifactorial including uncontrolled hypothyroidism.  Elevated troponin  In the Setting of ESRD    All problems new to this examiner.    Plan:  Please see above.    Kameron Luis MD  10/13/2022  19:12 EDT

## 2022-10-13 NOTE — ED PROVIDER NOTES
MD ATTESTATION NOTE    The AMA and I have discussed this patient's history, physical exam, and treatment plan.  I have reviewed the documentation and personally had a face to face interaction with the patient. I affirm the documentation and agree with the treatment and plan.  The attached note describes my personal findings.      I provided a substantive portion of the care of the patient.  I personally performed the physical exam in its entirety, and below are my findings.  For this patient encounter, the patient wore surgical mask, I wore full protective PPE including N95 and eye protection.      Brief HPI: Dialysis patient who presents with nausea and vomiting and weakness.  She missed her dialysis today and had a short treatment on Monday    PHYSICAL EXAM  ED Triage Vitals   Temp Heart Rate Resp BP SpO2   10/12/22 1713 10/12/22 1713 10/12/22 1713 10/12/22 1713 10/12/22 1713   98.4 °F (36.9 °C) 101 16 139/96 97 %      Temp src Heart Rate Source Patient Position BP Location FiO2 (%)   10/12/22 2204 10/12/22 2204 10/12/22 2204 10/12/22 2204 --   Oral Monitor Lying Left arm          GENERAL: Chronically ill-appearing  HENT: nares patent  EYES: no scleral icterus  CV: regular rhythm, normal rate  RESPIRATORY: normal effort, CTA bilaterally with no distress  ABDOMEN: soft  MUSCULOSKELETAL: no deformity  NEURO: alert, moves all extremities, follows commands  PSYCH:  calm, cooperative  SKIN: warm, dry    Vital signs and nursing notes reviewed.        Plan: Labs show hyperkalemia at 6.7 with a moderate acidosis.  EKG does not show any acute signs of hyperkalemia, and we have treated it appropriately in the ED.  Chest x-ray shows some volume overload and she is requiring 2 L of oxygen by nasal cannula to maintain her O2 sats.  We will admit and speak with nephrology about urgent dialysis treatment     Ze Lincoln MD  10/12/22 5235

## 2022-10-13 NOTE — ED NOTES
Nursing report ED to floor  Zaina Martinez  56 y.o.  female    HPI :   Chief Complaint   Patient presents with    Weakness - Generalized       Admitting doctor:   Jerson Bull MD    Admitting diagnosis:   The primary encounter diagnosis was Hyperkalemia. Diagnoses of Nausea and vomiting, unspecified vomiting type and ESRD on dialysis (HCC) were also pertinent to this visit.    Code status:   Current Code Status       Date Active Code Status Order ID Comments User Context       Prior            Allergies:   Contrast dye, Ibuprofen, and Prochlorperazine    Isolation:   No active isolations    Intake and Output    Intake/Output Summary (Last 24 hours) at 10/12/2022 2106  Last data filed at 10/12/2022 1931  Gross per 24 hour   Intake 50 ml   Output --   Net 50 ml       Weight:   There were no vitals filed for this visit.    Most recent vitals:   Vitals:    10/12/22 1713   BP: 139/96   Pulse: 101   Resp: 16   Temp: 98.4 °F (36.9 °C)   SpO2: 97%       Active LDAs/IV Access:   Lines, Drains & Airways       Active LDAs       Name Placement date Placement time Site Days    Peripheral IV 10/12/22 Anterior;Left Wrist 10/12/22  --  Wrist  less than 1    Supraglottic Airway 3 09/29/22  1700  created via procedure documentation  -- 13    Hemodialysis Cath Double 05/03/22  0909  Internal Jugular  162                    Labs (abnormal labs have a star):   Labs Reviewed   COMPREHENSIVE METABOLIC PANEL - Abnormal; Notable for the following components:       Result Value    Glucose 441 (*)     BUN 44 (*)     Creatinine 4.93 (*)     Sodium 129 (*)     Potassium 6.7 (*)     Chloride 90 (*)     CO2 19.9 (*)     Calcium 8.5 (*)     Albumin 3.30 (*)     Alkaline Phosphatase 300 (*)     Anion Gap 19.1 (*)     eGFR 9.8 (*)     All other components within normal limits    Narrative:     GFR Normal >60  Chronic Kidney Disease <60  Kidney Failure <15     LIPASE - Abnormal; Notable for the following components:    Lipase 213 (*)     All other  components within normal limits   TROPONIN (IN-HOUSE) - Abnormal; Notable for the following components:    Troponin T 0.194 (*)     All other components within normal limits    Narrative:     Troponin T Reference Range:  <= 0.03 ng/mL-   Negative for AMI  >0.03 ng/mL-     Abnormal for myocardial necrosis.  Clinicians would have to utilize clinical acumen, EKG, Troponin and serial changes to determine if it is an Acute Myocardial Infarction or myocardial injury due to an underlying chronic condition.       Results may be falsely decreased if patient taking Biotin.     CBC WITH AUTO DIFFERENTIAL - Abnormal; Notable for the following components:    MCHC 30.6 (*)     Neutrophil % 81.5 (*)     Lymphocyte % 11.9 (*)     Eosinophil % 0.1 (*)     Neutrophils, Absolute 7.46 (*)     All other components within normal limits   POCT GLUCOSE FINGERSTICK - Abnormal; Notable for the following components:    Glucose 394 (*)     All other components within normal limits   URINALYSIS W/ CULTURE IF INDICATED   BASIC METABOLIC PANEL   CBC AND DIFFERENTIAL    Narrative:     The following orders were created for panel order CBC & Differential.  Procedure                               Abnormality         Status                     ---------                               -----------         ------                     CBC Auto Differential[[id number]]        Abnormal            Final result                 Please view results for these tests on the individual orders.       EKG:   ECG 12 Lead   Final Result   HEART RATE= 115  bpm   RR Interval= 522  ms   AL Interval=   ms   P Horizontal Axis=   deg   P Front Axis=   deg   QRSD Interval= 107  ms   QT Interval= 335  ms   QRS Axis= -83  deg   T Wave Axis= 86  deg   - ABNORMAL ECG -   Atrial flutter   Left anterior fascicular block   No significant change from prior   Electronically Signed By: Lucas Arellano ([facility]) 12-Oct-2022 20:20:06   Date and Time of Study: 2022-10-12 18:50:55          Meds  given in ED:   Medications   dilTIAZem CD (CARDIZEM CD) 24 hr capsule 180 mg (has no administration in time range)   carvedilol (COREG) tablet 25 mg (has no administration in time range)   metoclopramide (REGLAN) injection 10 mg (has no administration in time range)   diphenhydrAMINE (BENADRYL) injection 12.5 mg (has no administration in time range)   insulin regular (humuLIN R,novoLIN R) injection 10 Units (10 Units Intravenous Given 10/12/22 1916)   calcium gluconate 1g/50ml 0.675% NaCl IV SOLN (0 g Intravenous Stopped 10/12/22 1931)       Imaging results:  CT Abdomen Pelvis Without Contrast    Result Date: 10/12/2022  Diffuse third spacing with pleural effusions and atelectasis versus pneumonia the lung bases. Left ureteral stent is positioned as expected. No acute abnormality identified.  This report was finalized on 10/12/2022 8:11 PM by Dr. Adonis Burch M.D.      XR Chest 1 View    Result Date: 10/12/2022  Volume overload with pulmonary edema and possible left base atelectasis or pneumonia.  This report was finalized on 10/12/2022 6:40 PM by Dr. Adonis Burch M.D.       Ambulatory status:   As  tolerated    Social issues:   Social History     Socioeconomic History    Marital status:      Spouse name: Marv   Tobacco Use    Smoking status: Never    Smokeless tobacco: Never   Vaping Use    Vaping Use: Never used   Substance and Sexual Activity    Alcohol use: Never    Drug use: Never    Sexual activity: Defer       NIH Stroke Scale:         Alberto Brown RN  10/12/22 21:06 EDT

## 2022-10-13 NOTE — PLAN OF CARE
Problem: Adult Inpatient Plan of Care  Goal: Plan of Care Review  Outcome: Ongoing, Progressing  Flowsheets (Taken 10/13/2022 4308)  Progress: no change  Plan of Care Reviewed With: patient  Outcome Evaluation: pt is aaox4. BP and HR elevated at times, heart rhythm a-flutter. dark red urine. oxygen weaned as tolerated. no c/o pain   Goal Outcome Evaluation:  Plan of Care Reviewed With: patient        Progress: no change  Outcome Evaluation: pt is aaox4. BP and HR elevated at times, heart rhythm a-flutter. dark red urine. oxygen weaned as tolerated. no c/o pain

## 2022-10-13 NOTE — NURSING NOTE
hd without incident or c/o. tolerated well. removed 3.4 l . drsg changed, cdi. no meds administered. stable upon completion of hd. Hr 120's throughout except during times of coughing or moving would go 130's but unsustained.

## 2022-10-13 NOTE — CONSULTS
"Nutrition Services    Patient Name:  Zaina Martinez  YOB: 1965  MRN: [id number]  Admit Date:  10/12/2022      Comment:  Nutrition assessment initiated from nursing admission screen (MST2, decreased po intake). Received emergent HD last night, K+ now 4.2. Pt is on a renal diet, ~50% po this am per pt report. Weight fluctuations noted - likely some of this is associated with fluid shifts with renal disease but pt reports she feels like she has lost \"real\" weight as well.      Pt agreeable to novasource renal supplements with meals.  Encouraged po. Also educated her on foods high potassium to avoid. Pt appreciate of visit. Will follow clinical course and assist with nutrition needs.    CLINICAL NUTRITION ASSESSMENT      Reason for Assessment MST score 2+, Nurse Admission Screen     Diagnosis/Problem   Dx - Hyperkalemia, ESRD-HD, DM, hyponatremia   Medical/Surgical History Past Medical History:   Diagnosis Date   • Acute CVA (cerebrovascular accident) (HCC) 5/1/2022   • Acute on chronic diastolic CHF (congestive heart failure) (HCC)    • CAD (coronary artery disease) 12/20/2021   • Diabetes (HCC)    • Disease of thyroid gland    • GERD (gastroesophageal reflux disease)    • Hyperlipidemia 11/30/2018   • Hypertension    • Rheumatoid arthritis (HCC)        Past Surgical History:   Procedure Laterality Date   • CHOLECYSTECTOMY WITH INTRAOPERATIVE CHOLANGIOGRAM N/A 1/10/2022    Procedure: Laparoscopic cholecystectomy with intraoperative cholangiogram;  Surgeon: Ramana Raygoza MD;  Location: [facility];  Service: General;  Laterality: N/A;   • CYSTOSCOPY W/ URETERAL STENT PLACEMENT Left 9/29/2022    Procedure: CYSTOSCOPY URETERAL STENT INSERTION WITH RETROGRADE PYELOGRAM;  Surgeon: Bryant Phan Jr., MD;  Location: [facility];  Service: Urology;  Laterality: Left;   • EYE SURGERY     • HYSTERECTOMY     • INSERTION HEMODIALYSIS CATHETER N/A 12/6/2021    Procedure: HEMODIALYSIS CATHETER " "INSERTION;  Surgeon: Keli Salazar MD;  Location: [facility] OR 18/19;  Service: Vascular;  Laterality: N/A;   • INSERTION HEMODIALYSIS CATHETER N/A 5/3/2022    Procedure: TUNNELED CATHETER PLACEMENT;  Surgeon: Keli Salazar MD;  Location: [facility] OR;  Service: Vascular;  Laterality: N/A;   • MUSCLE BIOPSY Left 6/15/2022    Procedure: Left quadriceps muscle biopsy;  Surgeon: Marques Ma MD;  Location: [facility] OR;  Service: Neurosurgery;  Laterality: Left;        Encounter Information        Nutrition/Diet History:     Food Preferences:    Supplements:    Factors Affecting Intake: decreased appetite     Anthropometrics        Current Height  Current Weight  BMI kg/m2 Height: 157.5 cm (62\")  Weight: 56.6 kg (124 lb 12.5 oz) (10/12/22 2204)  Body mass index is 22.82 kg/m².       Admission Weight 56.6kg   Ideal Body Weight (IBW) 50.1kg   Usual Body Weight (UBW)    Weight Change/Trend        Weight History Wt Readings from Last 30 Encounters:   10/12/22 2204 56.6 kg (124 lb 12.5 oz)   10/06/22 0511 56.4 kg (124 lb 4.8 oz)   10/05/22 0300 57.1 kg (125 lb 14.1 oz)   10/04/22 1516 54.2 kg (119 lb 8 oz)   10/04/22 0530 58 kg (127 lb 12.8 oz)   10/03/22 0500 57.7 kg (127 lb 3.3 oz)   10/02/22 0556 56.2 kg (123 lb 14.4 oz)   10/01/22 0500 55.2 kg (121 lb 12.8 oz)   09/30/22 1114 61 kg (134 lb 7.7 oz)   09/30/22 0551 64.5 kg (142 lb 3.2 oz)   09/29/22 0513 63 kg (138 lb 14.2 oz)   09/28/22 0615 59.8 kg (131 lb 13.4 oz)   09/27/22 1455 52.8 kg (116 lb 6.5 oz)   09/27/22 0600 56.3 kg (124 lb 1.9 oz)   09/27/22 0503 56.3 kg (124 lb 1.9 oz)   09/26/22 2305 59 kg (130 lb)   09/13/22 0500 60 kg (132 lb 4.4 oz)   09/11/22 0548 59.8 kg (131 lb 14.4 oz)   09/10/22 0530 56.7 kg (125 lb 1.6 oz)   09/09/22 0500 55.4 kg (122 lb 1.6 oz)   09/08/22 0511 59.5 kg (131 lb 3.2 oz)   09/07/22 1230 54.5 kg (120 lb 2.4 oz)   09/07/22 0640 56.2 kg (124 lb)   09/06/22 0539 55.5 kg (122 lb 6.4 oz)   09/05/22 0505 58.4 kg " (128 lb 12.8 oz)   09/04/22 0500 62.6 kg (138 lb 1.6 oz)   09/03/22 1708 64.9 kg (143 lb 1.3 oz)   09/03/22 1355 64.9 kg (143 lb 1.3 oz)   08/27/22 0527 64.9 kg (143 lb 1.6 oz)   08/26/22 0942 64.9 kg (143 lb)   08/26/22 0500 66.6 kg (146 lb 12.8 oz)   08/25/22 0810 67 kg (147 lb 11.3 oz)   08/25/22 0500 67.1 kg (147 lb 14.4 oz)   08/24/22 0518 67.7 kg (149 lb 4 oz)   08/23/22 0526 66.8 kg (147 lb 4.3 oz)   08/22/22 0521 67.5 kg (148 lb 14.4 oz)   08/21/22 0846 65.3 kg (144 lb)   08/21/22 0350 65.4 kg (144 lb 3.2 oz)   08/20/22 0516 65 kg (143 lb 3.2 oz)   08/04/22 0511 65.1 kg (143 lb 8.3 oz)   08/03/22 0504 66.8 kg (147 lb 4.3 oz)   08/02/22 0602 65.4 kg (144 lb 2.9 oz)   08/01/22 1557 62.5 kg (137 lb 12.6 oz)   08/01/22 0700 68.3 kg (150 lb 9.2 oz)   07/31/22 0500 64.3 kg (141 lb 12.1 oz)   07/30/22 0638 65.2 kg (143 lb 11.8 oz)   07/29/22 1532 66.7 kg (147 lb)   07/29/22 0548 66.7 kg (147 lb 0.8 oz)   07/28/22 0610 65 kg (143 lb 4.8 oz)   07/27/22 2055 65.6 kg (144 lb 9.6 oz)   07/22/22 1542 66.3 kg (146 lb 2.6 oz)   07/22/22 0632 69.3 kg (152 lb 12.5 oz)   07/21/22 0539 69.7 kg (153 lb 10.6 oz)   07/18/22 0842 71.1 kg (156 lb 11.2 oz)   07/17/22 0549 68.6 kg (151 lb 3.8 oz)   07/16/22 0633 67 kg (147 lb 12.8 oz)   07/14/22 1738 69.9 kg (154 lb)   07/13/22 1041 61.7 kg (136 lb 1.6 oz)   07/04/22 1756 75.5 kg (166 lb 7.2 oz)   07/01/22 1700 73.9 kg (163 lb)   06/15/22 1100 62.5 kg (137 lb 12.6 oz)   06/12/22 0743 66.2 kg (145 lb 15.1 oz)   05/26/22 1700 64.9 kg (143 lb 1.3 oz)   05/24/22 1726 64.5 kg (142 lb 3.2 oz)   05/16/22 0817 54.2 kg (119 lb 7.8 oz)   05/13/22 1911 54.2 kg (119 lb 7.8 oz)   05/13/22 1300 51 kg (112 lb 7 oz)   05/13/22 0700 54.7 kg (120 lb 8 oz)   05/12/22 0514 55.9 kg (123 lb 3.8 oz)   05/11/22 0535 54.1 kg (119 lb 4.3 oz)   05/10/22 0512 52.9 kg (116 lb 9.6 oz)   05/09/22 0535 56.3 kg (124 lb 3.2 oz)   05/08/22 0614 54.7 kg (120 lb 9.5 oz)   05/07/22 0552 48.2 kg (106 lb 4.2 oz)   05/06/22  0500 46 kg (101 lb 6.4 oz)   05/05/22 1154 46.2 kg (101 lb 13.6 oz)   05/05/22 0647 54.5 kg (120 lb 2.4 oz)   05/04/22 0300 57.2 kg (126 lb 1.7 oz)   05/03/22 0520 59.9 kg (132 lb 0.9 oz)   05/01/22 1135 57.8 kg (127 lb 6.8 oz)   05/01/22 0600 57.8 kg (127 lb 6.8 oz)   04/29/22 1900 50.3 kg (110 lb 14.3 oz)   04/29/22 0519 50.3 kg (110 lb 12.8 oz)   04/28/22 0558 55.9 kg (123 lb 3.8 oz)   04/28/22 0427 54.8 kg (120 lb 13 oz)   04/28/22 1511 55.8 kg (123 lb)   03/02/22 1615 62.4 kg (137 lb 9.1 oz)   03/02/22 0020 64.4 kg (141 lb 15.6 oz)   03/01/22 0434 62 kg (136 lb 11.2 oz)   02/28/22 0413 62.2 kg (137 lb 3.2 oz)   02/27/22 0644 64.1 kg (141 lb 5 oz)   02/26/22 0500 65.1 kg (143 lb 8.3 oz)   02/25/22 0500 65.4 kg (144 lb 2.9 oz)   02/24/22 0500 65.9 kg (145 lb 4.5 oz)   02/23/22 2300 64.9 kg (143 lb 1.3 oz)   01/24/22 0814 71.4 kg (157 lb 6.4 oz)   01/15/22 0500 71.2 kg (156 lb 15.5 oz)   01/14/22 0500 69.6 kg (153 lb 7 oz)   01/13/22 0500 69.7 kg (153 lb 10.6 oz)   01/12/22 0500 69.9 kg (154 lb 1.6 oz)   01/11/22 0500 86.3 kg (190 lb 4.1 oz)   01/10/22 0700 82.3 kg (181 lb 8 oz)   01/09/22 1808 77.6 kg (171 lb)   01/09/22 1006 79.4 kg (175 lb)   12/29/21 0508 71.8 kg (158 lb 3.2 oz)   12/28/21 1251 73.7 kg (162 lb 7.7 oz)   12/28/21 0513 73.7 kg (162 lb 7.7 oz)   12/27/21 0500 67.7 kg (149 lb 3.2 oz)   12/26/21 1053 70.7 kg (155 lb 13.8 oz)   12/26/21 0500 73.8 kg (162 lb 9.6 oz)   12/25/21 0457 72.3 kg (159 lb 4.8 oz)   12/24/21 0500 72.6 kg (160 lb 1.6 oz)   12/23/21 0500 75.9 kg (167 lb 6.4 oz)   12/22/21 0500 73.9 kg (163 lb)   12/20/21 1100 70.3 kg (155 lb)   12/19/21 1338 69.9 kg (154 lb)   12/17/21 0449 69.9 kg (154 lb)   12/16/21 1300 70.2 kg (154 lb 12.2 oz)   12/16/21 0306 72.3 kg (159 lb 4.8 oz)   12/15/21 1531 70.2 kg (154 lb 12.8 oz)   12/01/21 2300 61.2 kg (135 lb)   11/30/21 1252 59 kg (130 lb)   11/30/21 0200 59 kg (130 lb)             Tests/Procedures        Tests/Procedures CT scan, Dialysis,  X-Ray     Labs       Pertinent Labs    Results from last 7 days   Lab Units 10/13/22  0827 10/12/22  2351 10/12/22  1811   SODIUM mmol/L 130* 125* 129*   POTASSIUM mmol/L 4.2 6.8* 6.7*   CHLORIDE mmol/L 94* 91* 90*   CO2 mmol/L 26.0 18.5* 19.9*   BUN mg/dL 19 45* 44*   CREATININE mg/dL 2.44* 4.76* 4.93*   CALCIUM mg/dL 8.1* 8.5* 8.5*   BILIRUBIN mg/dL  --   --  0.4   ALK PHOS U/L  --   --  300*   ALT (SGPT) U/L  --   --  26   AST (SGOT) U/L  --   --  23   GLUCOSE mg/dL 290* 344* 441*     Results from last 7 days   Lab Units 10/13/22  0827 10/12/22  2351 10/12/22  1811   MAGNESIUM mg/dL  --  2.0  --    HEMOGLOBIN g/dL 12.2  --  13.2   HEMATOCRIT % 40.1  --  43.2   WBC 10*3/mm3 8.15  --  9.16   ALBUMIN g/dL  --   --  3.30*     Results from last 7 days   Lab Units 10/13/22  0827 10/12/22  1811   PLATELETS 10*3/mm3 219 234     COVID19   Date Value Ref Range Status   09/03/2022 Not Detected Not Detected - Ref. Range Final     Lab Results   Component Value Date    HGBA1C 7.70 (H) 09/04/2022          Medications           Scheduled Medications apixaban, 5 mg, Oral, Q12H  aspirin, 81 mg, Oral, Daily  carvedilol, 25 mg, Oral, BID With Meals  dilTIAZem CD, 180 mg, Oral, Q24H  folic acid, 1 mg, Oral, Daily  insulin glargine, 3 Units, Subcutaneous, Nightly  insulin lispro, 0-7 Units, Subcutaneous, TID AC  levothyroxine, 250 mcg, Oral, Q AM  pantoprazole, 40 mg, Oral, Daily  rOPINIRole, 1 mg, Oral, Nightly  sertraline, 150 mg, Oral, Daily       Infusions     PRN Medications •  albumin human  •  dextrose  •  dextrose  •  glucagon (human recombinant)  •  heparin (porcine)  •  ondansetron     Physical Findings        Physical Appearance alert, on oxygen therapy     NFPE Not applicable   --  Edema  no edema   Gastrointestinal last bowel movement:10/11   Tubes/Drains none   Oral/Mouth Cavity WNL   Skin skin intact, bruising   --  Current Nutrition Orders & Evaluation of Intake       Oral Nutrition     Food Allergies NKFA   Current PO  Diet Diet Regular; Consistent Carbohydrate, Renal   Supplement n/a   PO Evaluation     Trending % PO Intake 50% per pt       --  PES STATEMENT / NUTRITION DIAGNOSIS      Nutrition Dx Problem  Problem: Predicted Suboptimal Intake  Etiology: Medical Diagnosis ESRD  Signs/Symptoms: Report of Minimal PO Intake    Comment:    --  NUTRITION INTERVENTION      Intervention Goal(s) Maintain nutrition status, Reduce/improve symptoms, Meet estimated needs, Disease management/therapy, Tolerate PO  and Maintain weight         RD Intervention/Action Advise alternative selection, Advise available snack, Encourage intake, Follow Tx Progress and Care plan reviewed         Prescription/Orders:       PO Diet       Supplements Novasource renal with meals      Enteral Nutrition       Parenteral Nutrition    New Prescription Ordered? yes   --      Monitor/Evaluation Per protocol, I&O, PO intake, Pertinent labs, Weight, Skin status, Symptoms   Education Will instruct as appropriate           Education topic: Potassium     Resources given:  Printed materials     Advised regarding: Food choices, Food shopping, Label reading, Supplement use     Learner:  Patient     Readiness to learn: Accepting     RD contact info provided: Yes     Outpatient referral:        RD to follow per protocol.    Electronically signed by:  Adriana Porter RD  10/13/22 10:17 EDT

## 2022-10-13 NOTE — H&P
Patient Name:  Zaina Martinez  YOB: 1965  MRN:  [id number]  Admit Date:  10/12/2022  Patient Care Team:  Jane Kyle NP as PCP - General (Family Medicine)  Adonis Fraga Jr., MD as Consulting Physician (Hematology and Oncology)      Subjective   History Present Illness     Chief Complaint   Patient presents with   • Weakness - Generalized       Ms. Martinez is a 56 y.o. female with history of HFrEF, ESRD on HD M/W/F, type 2 diabetes mellitus, CAD, atrial flutter, hypertension, hyperlipidemia, rheumatoid arthritis, hypothyroidism and anemia of end-stage renal disease who presented to [facility] with generalized weakness, and missed dialysis today, only had one hour on Monday.     She was recently admitted for urosepsis and discharged on 9/27, after having a ureteral stent placed.    In the ER, her lipase as 213, but she was not complaining of abdominal pain.  Troponin was elevated 0.194, but she does have a chronic troponin elevation.  Sodium was 129.  CT scan of the abdomen pelvis showed a left acute ureteral stent positioned as expected, CXR diffuse third spacing with pleural effusions and atelectasis versus pneumonia at lung bases.      Review of Systems   Constitutional: Positive for appetite change and fatigue. Negative for chills and fever.   Respiratory: Negative for chest tightness and shortness of breath.    Cardiovascular: Negative for chest pain and palpitations.   Gastrointestinal: Negative for abdominal pain and constipation.   Genitourinary: Negative for urgency and vaginal bleeding.   Musculoskeletal: Negative for joint swelling and neck stiffness.   Skin: Negative for color change and rash.   Neurological: Positive for weakness. Negative for dizziness and seizures.        Personal History     Past Medical History:   Diagnosis Date   • Acute CVA (cerebrovascular accident) (HCC) 5/1/2022   • Acute on chronic diastolic CHF (congestive heart failure) (HCC)    • CAD  (coronary artery disease) 12/20/2021   • Diabetes (HCC)    • Disease of thyroid gland    • GERD (gastroesophageal reflux disease)    • Hyperlipidemia 11/30/2018   • Hypertension    • Rheumatoid arthritis (HCC)      Past Surgical History:   Procedure Laterality Date   • CHOLECYSTECTOMY WITH INTRAOPERATIVE CHOLANGIOGRAM N/A 1/10/2022    Procedure: Laparoscopic cholecystectomy with intraoperative cholangiogram;  Surgeon: Ramana Raygoza MD;  Location: [facility] OR;  Service: General;  Laterality: N/A;   • CYSTOSCOPY W/ URETERAL STENT PLACEMENT Left 9/29/2022    Procedure: CYSTOSCOPY URETERAL STENT INSERTION WITH RETROGRADE PYELOGRAM;  Surgeon: Bryant Phan Jr., MD;  Location: [facility] OR;  Service: Urology;  Laterality: Left;   • EYE SURGERY     • HYSTERECTOMY     • INSERTION HEMODIALYSIS CATHETER N/A 12/6/2021    Procedure: HEMODIALYSIS CATHETER INSERTION;  Surgeon: Keli Salazar MD;  Location: [facility] OR 18/19;  Service: Vascular;  Laterality: N/A;   • INSERTION HEMODIALYSIS CATHETER N/A 5/3/2022    Procedure: TUNNELED CATHETER PLACEMENT;  Surgeon: Keli Salazar MD;  Location: [facility] OR;  Service: Vascular;  Laterality: N/A;   • MUSCLE BIOPSY Left 6/15/2022    Procedure: Left quadriceps muscle biopsy;  Surgeon: Marques Ma MD;  Location: [facility] OR;  Service: Neurosurgery;  Laterality: Left;     Family History   Problem Relation Age of Onset   • Malig Hyperthermia Neg Hx      Social History     Tobacco Use   • Smoking status: Never   • Smokeless tobacco: Never   Vaping Use   • Vaping Use: Never used   Substance Use Topics   • Alcohol use: Never   • Drug use: Never     No current facility-administered medications on file prior to encounter.     Current Outpatient Medications on File Prior to Encounter   Medication Sig Dispense Refill   • apixaban (ELIQUIS) 5 MG tablet tablet Take 1 tablet by mouth Every 12 (Twelve) Hours. Indications: Atrial Fibrillation 60 tablet 0   •  aspirin 81 MG EC tablet Take 1 tablet by mouth Daily. 90 tablet 1   • carvedilol (COREG) 25 MG tablet Take 1 tablet by mouth 2 (Two) Times a Day With Meals. 60 tablet 2   • dilTIAZem CD (CARDIZEM CD) 180 MG 24 hr capsule Take 1 capsule by mouth Daily. 30 capsule 2   • folic acid (FOLVITE) 1 MG tablet Take 1 mg by mouth Daily.     • Hydrocortisone, Perianal, (ANUSOL-HC) 2.5 % rectal cream Insert  into the rectum 2 (Two) Times a Day. 28 g 0   • insulin glargine (LANTUS, SEMGLEE) 100 UNIT/ML injection Inject 3 Units under the skin into the appropriate area as directed Every Night.  12   • insulin lispro (ADMELOG) 100 UNIT/ML injection Inject 0-14 Units under the skin into the appropriate area as directed 3 (Three) Times a Day Before Meals. (Patient taking differently: Inject 0-14 Units under the skin into the appropriate area as directed 3 (Three) Times a Day Before Meals. Per sliding Scale)  12   • levothyroxine (SYNTHROID, LEVOTHROID) 125 MCG tablet Take 2 tablets by mouth Every Morning. 60 tablet 2   • melatonin 3 MG tablet Take 1 tablet by mouth Every Night. 30 tablet 0   • miconazole (MICOTIN) 2 % cream Apply 1 application topically to the appropriate area as directed Every 12 (Twelve) Hours. (Patient taking differently: Apply 1 application topically to the appropriate area as directed Every 12 (Twelve) Hours. Lower Back Rash)     • pantoprazole (PROTONIX) 40 MG EC tablet Take 1 tablet by mouth Daily. 90 tablet 0   • polyethylene glycol (MIRALAX) 17 g packet Take 17 g by mouth Daily As Needed (constipation).     • predniSONE (DELTASONE) 5 MG tablet Two tabs po with breakfast July 26-July 28, 1.5 tabs with breakfast July 29-August 4, then 1 tab daily starting Friday August 5, 2022 and continue on that dose (Patient taking differently: Take 5 mg by mouth See Admin Instructions. Two tabs po with breakfast July 26-July 28, 1.5 tabs with breakfast July 29-August 4, then 1 tab daily starting Friday August 5, 2022 and  continue on that dose) 35 tablet 1   • rOPINIRole (REQUIP) 1 MG tablet Take 1 tablet by mouth Every Night for 30 days. Take 1 hour before bedtime. 30 tablet 0   • sertraline (ZOLOFT) 50 MG tablet Take 3 tablets by mouth Daily. 90 tablet 1     Allergies   Allergen Reactions   • Contrast Dye Confusion   • Ibuprofen Other (See Comments)     Kidney disease   • Prochlorperazine Other (See Comments)     Dystonic reaction            Objective    Objective     Vital Signs  Temp:  [98.4 °F (36.9 °C)] 98.4 °F (36.9 °C)  Heart Rate:  [101] 101  Resp:  [16] 16  BP: (139)/(96) 139/96  SpO2:  [97 %] 97 %  on  Flow (L/min):  [2] 2;   Device (Oxygen Therapy): nasal cannula  There is no height or weight on file to calculate BMI.    Physical Exam  Constitutional:       Appearance: Normal appearance.   HENT:      Head: Normocephalic and atraumatic.   Eyes:      Extraocular Movements: Extraocular movements intact.      Pupils: Pupils are equal, round, and reactive to light.   Cardiovascular:      Rate and Rhythm: Regular rhythm. Tachycardia present.   Pulmonary:      Effort: Pulmonary effort is normal. No respiratory distress.   Abdominal:      General: There is no distension.      Palpations: Abdomen is soft.      Tenderness: There is no abdominal tenderness.   Musculoskeletal:      Right lower leg: No edema.      Left lower leg: No edema.   Skin:     General: Skin is warm and dry.   Neurological:      General: No focal deficit present.      Mental Status: She is alert and oriented to person, place, and time.      Motor: Weakness present.         Results Review:  I reviewed the patient's new clinical results.  I reviewed the patient's new imaging results and agree with the interpretation.  I reviewed the patient's other test results and agree with the interpretation  I personally viewed and interpreted the patient's EKG/Telemetry data  Discussed with ED provider.    Lab Results (last 24 hours)     Procedure Component Value Units  Date/Time    CBC & Differential [[id number]]  (Abnormal) Collected: 10/12/22 1811    Specimen: Blood Updated: 10/12/22 1818    Narrative:      The following orders were created for panel order CBC & Differential.  Procedure                               Abnormality         Status                     ---------                               -----------         ------                     CBC Auto Differential[[id number]]        Abnormal            Final result                 Please view results for these tests on the individual orders.    Comprehensive Metabolic Panel [[id number]]  (Abnormal) Collected: 10/12/22 1811    Specimen: Blood Updated: 10/12/22 1851     Glucose 441 mg/dL      BUN 44 mg/dL      Creatinine 4.93 mg/dL      Sodium 129 mmol/L      Potassium 6.7 mmol/L      Comment: Slight hemolysis detected by analyzer. Results may be affected.        Chloride 90 mmol/L      CO2 19.9 mmol/L      Calcium 8.5 mg/dL      Total Protein 6.1 g/dL      Albumin 3.30 g/dL      ALT (SGPT) 26 U/L      AST (SGOT) 23 U/L      Alkaline Phosphatase 300 U/L      Total Bilirubin 0.4 mg/dL      Globulin 2.8 gm/dL      A/G Ratio 1.2 g/dL      BUN/Creatinine Ratio 8.9     Anion Gap 19.1 mmol/L      eGFR 9.8 mL/min/1.73      Comment: <15 Indicative of kidney failure       Narrative:      GFR Normal >60  Chronic Kidney Disease <60  Kidney Failure <15      Lipase [[id number]]  (Abnormal) Collected: 10/12/22 1811    Specimen: Blood Updated: 10/12/22 1841     Lipase 213 U/L     Troponin [[id number]]  (Abnormal) Collected: 10/12/22 1811    Specimen: Blood Updated: 10/12/22 1851     Troponin T 0.194 ng/mL     Narrative:      Troponin T Reference Range:  <= 0.03 ng/mL-   Negative for AMI  >0.03 ng/mL-     Abnormal for myocardial necrosis.  Clinicians would have to utilize clinical acumen, EKG, Troponin and serial changes to determine if it is an Acute Myocardial Infarction or myocardial injury due to an underlying chronic condition.       Results  may be falsely decreased if patient taking Biotin.      CBC Auto Differential [[id number]]  (Abnormal) Collected: 10/12/22 1811    Specimen: Blood Updated: 10/12/22 1818     WBC 9.16 10*3/mm3      RBC 4.85 10*6/mm3      Hemoglobin 13.2 g/dL      Hematocrit 43.2 %      MCV 89.1 fL      MCH 27.2 pg      MCHC 30.6 g/dL      RDW 14.8 %      RDW-SD 47.7 fl      MPV 10.2 fL      Platelets 234 10*3/mm3      Neutrophil % 81.5 %      Lymphocyte % 11.9 %      Monocyte % 5.5 %      Eosinophil % 0.1 %      Basophil % 0.5 %      Immature Grans % 0.5 %      Neutrophils, Absolute 7.46 10*3/mm3      Lymphocytes, Absolute 1.09 10*3/mm3      Monocytes, Absolute 0.50 10*3/mm3      Eosinophils, Absolute 0.01 10*3/mm3      Basophils, Absolute 0.05 10*3/mm3      Immature Grans, Absolute 0.05 10*3/mm3      nRBC 0.0 /100 WBC     POC Glucose Once [[id number]]  (Abnormal) Collected: 10/12/22 1955    Specimen: Blood Updated: 10/12/22 1956     Glucose 394 mg/dL      Comment: Meter: [id number] : [id number] [facility]             Imaging Results (Last 24 Hours)     Procedure Component Value Units Date/Time    CT Abdomen Pelvis Without Contrast [[id number]] Collected: 10/12/22 2005     Updated: 10/12/22 2014    Narrative:      ABDOMEN AND PELVIS CT WITHOUT CONTRAST     HISTORY: Nausea vomiting and lipase, lengthy list of medical issues.     Radiation dose reduction techniques were utilized, including automated  exposure control and exposure modulation based on body size.     Noncontrast abdomen and pelvis CT is provided. Correlation with  noncontrast CT July 27, 2022.     FINDINGS: Atelectasis or pneumonia in the posterior lung bases  bilaterally with bilateral pleural effusions and diffuse third spacing.  Left ureteral stent is positioned as expected. No hydronephrosis.  Extensive atheromatous vascular calcification with no abnormally dilated  bowel. Prior cholecystectomy. Renal parenchyma appears somewhat  atrophic. Solid and  parenchyma otherwise appears normal. Partly  calcified, well-demarcated lesion in the mesentery 31 x 19 mm is smaller  and more mineralized today than on July 10 exam and may represent a  small aging hematoma.     Chronic T12 superior plate deformity with mild loss of height  anteriorly; no paraspinal inflammatory change.       Impression:      Diffuse third spacing with pleural effusions and atelectasis  versus pneumonia the lung bases. Left ureteral stent is positioned as  expected. No acute abnormality identified.     This report was finalized on 10/12/2022 8:11 PM by Dr. Adonis Burch M.D.       XR Chest 1 View [[id number]] Collected: 10/12/22 1838     Updated: 10/12/22 1843    Narrative:      PORTABLE CHEST X-RAY     HISTORY: Tachycardia.     TECHNIQUE: Oblique portable chest x-ray correlated chest x-ray September 26.     FINDINGS: Right IJ dialysis catheter is again observed. There is  pulmonary vascular engorgement and pulmonary edema suspected small  pleural effusions bilaterally and pneumonia or atelectasis in the left  retrocardiac lung base. The appearance is similar to that observed on  x-ray last month.       Impression:      Volume overload with pulmonary edema and possible left base  atelectasis or pneumonia.     This report was finalized on 10/12/2022 6:40 PM by Dr. Adonis Burch M.D.             Results for orders placed during the hospital encounter of 08/19/22    Adult Transesophageal Echo (ANJEL) W/ Cont if Necessary Per Protocol    Interpretation Summary  · There is a dialysis catheter which extends into the right atrial cavity. There is a large and highly mobile echodensity attached to the distal portion of the dialysis catheter. The echodensity measures 1.4 cm x 0.86 cm, and is most consistent with a thrombus  · Estimated left ventricular EF = 55% Left ventricular systolic function is normal.  · Left ventricular wall thickness is consistent with mild concentric hypertrophy.  · Normal right  ventricular cavity size and systolic function noted.  · Doppler interrogation shows severely reduced flow within the left atrial appendage. No evidence of a left atrial appendage thrombus was present.  · There is a small PFO noted on the agitated saline study  · Moderate mitral valve regurgitation is present  · Moderate tricuspid valve regurgitation is present.  · Calculated right ventricular systolic pressure from tricuspid regurgitation is 42 mmHg.  · There is a small focal area of severe plaque in the descending thoracic aorta. No significant plaques were seen in the aortic arch  · There is a small circumferential pericardial effusion without evidence of tamponade. There is a small left pleural effusion      ECG 12 Lead   Final Result   HEART RATE= 115  bpm   RR Interval= 522  ms   IA Interval=   ms   P Horizontal Axis=   deg   P Front Axis=   deg   QRSD Interval= 107  ms   QT Interval= 335  ms   QRS Axis= -83  deg   T Wave Axis= 86  deg   - ABNORMAL ECG -   Atrial flutter   Left anterior fascicular block   No significant change from prior   Electronically Signed By: Lucas Arellano ([facility]) 12-Oct-2022 20:20:06   Date and Time of Study: 2022-10-12 18:50:55           Assessment/Plan     Active Hospital Problems    Diagnosis  POA   • **Hyperkalemia [E87.5]  Yes   • Status post cholecystectomy [Z90.49]  Not Applicable   • Hyponatremia [E87.1]  Yes   • ESRD (end stage renal disease) (HCC) [N18.6]  Yes   • Generalized weakness [R53.1]  Yes   • Type 2 diabetes mellitus with kidney complication, with long-term current use of insulin (HCC) [E11.29, Z79.4]  Not Applicable      Resolved Hospital Problems   No resolved problems to display.       56 year old F with a past medical history of atrial flutter, HFrEF, ESRD on HD M/W/F, type 2 diabetes mellitus, CAD, and HTN who presents to the hospital with generalized fatigue after missing a day of dialysis.    ESRD on hemodialysis  Hyperkalemia  Hyponatremia  Administer Lokelma  x1  Nephrology consulted, stat dialysis orders placed     Hypoxic respiratory failure  Likely due to fluid overload. CXR mentions left atelectasis vs infiltrate, previous chest x-rays note     Atrial flutter  Resume Coreg and diltiazem  Resume Eliquis    Type 2 diabetes  Blood sugars elevated, given 1 dose of IV regular insulin in the ER  Appears that she takes 3 units of subcutaneous glargine nightly  Start sliding scale, accu-checks     Generalized Weakness  PT/OT consult   Will need SNF at discharge.     Elevated troponin  In the Setting of ESRD  Does not complain of chest pain, appears to have chronic troponin elevation.   EKG with atrial flutter, LAFB  Trend troponin      · I discussed the patient's findings and my recommendations with patient, family and ED provider.    VTE Prophylaxis - SCDs.  Code Status - Full code.       Jerson Bull MD  [facility]  10/12/22  21:59 EDT

## 2022-10-14 LAB
ANION GAP SERPL CALCULATED.3IONS-SCNC: 12.1 MMOL/L (ref 5–15)
BACTERIA SPEC AEROBE CULT: NORMAL
BUN SERPL-MCNC: 37 MG/DL (ref 6–20)
BUN/CREAT SERPL: 10.8 (ref 7–25)
CALCIUM SPEC-SCNC: 7.8 MG/DL (ref 8.6–10.5)
CHLORIDE SERPL-SCNC: 90 MMOL/L (ref 98–107)
CO2 SERPL-SCNC: 25.9 MMOL/L (ref 22–29)
CREAT SERPL-MCNC: 3.42 MG/DL (ref 0.57–1)
EGFRCR SERPLBLD CKD-EPI 2021: 15.1 ML/MIN/1.73
GLUCOSE BLDC GLUCOMTR-MCNC: 138 MG/DL (ref 70–130)
GLUCOSE BLDC GLUCOMTR-MCNC: 164 MG/DL (ref 70–130)
GLUCOSE BLDC GLUCOMTR-MCNC: 197 MG/DL (ref 70–130)
GLUCOSE BLDC GLUCOMTR-MCNC: 240 MG/DL (ref 70–130)
GLUCOSE BLDC GLUCOMTR-MCNC: 403 MG/DL (ref 70–130)
GLUCOSE SERPL-MCNC: 245 MG/DL (ref 65–99)
HBA1C MFR BLD: 9.7 % (ref 4.8–5.6)
POTASSIUM SERPL-SCNC: 5 MMOL/L (ref 3.5–5.2)
QT INTERVAL: 337 MS
SODIUM SERPL-SCNC: 128 MMOL/L (ref 136–145)
TSH SERPL DL<=0.05 MIU/L-ACNC: 79.6 UIU/ML (ref 0.27–4.2)

## 2022-10-14 PROCEDURE — A9270 NON-COVERED ITEM OR SERVICE: HCPCS | Performed by: STUDENT IN AN ORGANIZED HEALTH CARE EDUCATION/TRAINING PROGRAM

## 2022-10-14 PROCEDURE — 63710000001 INSULIN LISPRO (HUMAN) PER 5 UNITS: Performed by: STUDENT IN AN ORGANIZED HEALTH CARE EDUCATION/TRAINING PROGRAM

## 2022-10-14 PROCEDURE — 63710000001 ROPINIROLE 1 MG TABLET: Performed by: NURSE PRACTITIONER

## 2022-10-14 PROCEDURE — 84443 ASSAY THYROID STIM HORMONE: CPT | Performed by: HOSPITALIST

## 2022-10-14 PROCEDURE — 82962 GLUCOSE BLOOD TEST: CPT

## 2022-10-14 PROCEDURE — 63710000001 ASPIRIN 81 MG TABLET DELAYED-RELEASE: Performed by: STUDENT IN AN ORGANIZED HEALTH CARE EDUCATION/TRAINING PROGRAM

## 2022-10-14 PROCEDURE — G0378 HOSPITAL OBSERVATION PER HR: HCPCS

## 2022-10-14 PROCEDURE — 63710000001 CARVEDILOL 25 MG TABLET: Performed by: STUDENT IN AN ORGANIZED HEALTH CARE EDUCATION/TRAINING PROGRAM

## 2022-10-14 PROCEDURE — G0257 UNSCHED DIALYSIS ESRD PT HOS: HCPCS

## 2022-10-14 PROCEDURE — A9270 NON-COVERED ITEM OR SERVICE: HCPCS | Performed by: NURSE PRACTITIONER

## 2022-10-14 PROCEDURE — 93005 ELECTROCARDIOGRAM TRACING: CPT | Performed by: STUDENT IN AN ORGANIZED HEALTH CARE EDUCATION/TRAINING PROGRAM

## 2022-10-14 PROCEDURE — 63710000001 FOLIC ACID 1 MG TABLET: Performed by: STUDENT IN AN ORGANIZED HEALTH CARE EDUCATION/TRAINING PROGRAM

## 2022-10-14 PROCEDURE — 63710000001 LEVOTHYROXINE 125 MCG TABLET: Performed by: STUDENT IN AN ORGANIZED HEALTH CARE EDUCATION/TRAINING PROGRAM

## 2022-10-14 PROCEDURE — 97110 THERAPEUTIC EXERCISES: CPT | Performed by: PHYSICAL THERAPIST

## 2022-10-14 PROCEDURE — 80048 BASIC METABOLIC PNL TOTAL CA: CPT | Performed by: HOSPITALIST

## 2022-10-14 PROCEDURE — 93010 ELECTROCARDIOGRAM REPORT: CPT | Performed by: INTERNAL MEDICINE

## 2022-10-14 PROCEDURE — 63710000001 DILTIAZEM CD 180 MG CAPSULE SUSTAINED-RELEASE 24 HR: Performed by: STUDENT IN AN ORGANIZED HEALTH CARE EDUCATION/TRAINING PROGRAM

## 2022-10-14 PROCEDURE — 63710000001 SERTRALINE 50 MG TABLET: Performed by: STUDENT IN AN ORGANIZED HEALTH CARE EDUCATION/TRAINING PROGRAM

## 2022-10-14 PROCEDURE — 63710000001 APIXABAN 5 MG TABLET: Performed by: STUDENT IN AN ORGANIZED HEALTH CARE EDUCATION/TRAINING PROGRAM

## 2022-10-14 PROCEDURE — 83036 HEMOGLOBIN GLYCOSYLATED A1C: CPT | Performed by: HOSPITALIST

## 2022-10-14 PROCEDURE — 63710000001 PANTOPRAZOLE 40 MG TABLET DELAYED-RELEASE: Performed by: STUDENT IN AN ORGANIZED HEALTH CARE EDUCATION/TRAINING PROGRAM

## 2022-10-14 RX ADMIN — INSULIN LISPRO 3 UNITS: 100 INJECTION, SOLUTION INTRAVENOUS; SUBCUTANEOUS at 06:39

## 2022-10-14 RX ADMIN — FOLIC ACID 1 MG: 1 TABLET ORAL at 17:24

## 2022-10-14 RX ADMIN — ROPINIROLE 1 MG: 1 TABLET, FILM COATED ORAL at 21:40

## 2022-10-14 RX ADMIN — INSULIN LISPRO 2 UNITS: 100 INJECTION, SOLUTION INTRAVENOUS; SUBCUTANEOUS at 17:23

## 2022-10-14 RX ADMIN — PANTOPRAZOLE SODIUM 40 MG: 40 TABLET, DELAYED RELEASE ORAL at 17:29

## 2022-10-14 RX ADMIN — SERTRALINE 150 MG: 100 TABLET, FILM COATED ORAL at 17:24

## 2022-10-14 RX ADMIN — LEVOTHYROXINE SODIUM 250 MCG: 0.12 TABLET ORAL at 06:39

## 2022-10-14 RX ADMIN — CARVEDILOL 25 MG: 25 TABLET, FILM COATED ORAL at 17:23

## 2022-10-14 RX ADMIN — INSULIN GLARGINE-YFGN 12 UNITS: 100 INJECTION, SOLUTION SUBCUTANEOUS at 22:21

## 2022-10-14 RX ADMIN — APIXABAN 5 MG: 5 TABLET, FILM COATED ORAL at 09:00

## 2022-10-14 RX ADMIN — DILTIAZEM HYDROCHLORIDE 180 MG: 180 CAPSULE, EXTENDED RELEASE ORAL at 17:24

## 2022-10-14 RX ADMIN — APIXABAN 5 MG: 5 TABLET, FILM COATED ORAL at 21:40

## 2022-10-14 RX ADMIN — CARVEDILOL 25 MG: 25 TABLET, FILM COATED ORAL at 09:00

## 2022-10-14 RX ADMIN — ASPIRIN 81 MG: 81 TABLET, FILM COATED ORAL at 17:25

## 2022-10-14 NOTE — PROGRESS NOTES
"[facility]  Call received with referral from NATALY Tam requesting that patient is ready for dc but that pt is reporting that she is \"at baseline\" and doing \" better than when she left Acute Rehab.   We are happy to work this patient up, but also noted that she would require a precert.   Per Anel, she is unsure if pt would need Acute Rehab at this time. States she would discuss with pt and family and call us back if a full referral is requested.   Will await call    Silvia Vázqeuz RN  Acute Rehab Admission Nurse  "

## 2022-10-14 NOTE — NURSING NOTE
Dialysis complete and removed 2.1 liters with this treatment, patients heart rate is running between 120-140, patient was seen by Dr. Lange and order to continue with dialysis unless heart rate was sustained greater than 140 for 15 minutes or if blood pressure dropped.  When b/p went to 92 systolic, call placed to Dr. Lange and order received to stop treatment.  Patient does remain asymptomatic.  Pre and post vital signs are in epic.

## 2022-10-14 NOTE — DISCHARGE PLACEMENT REQUEST
"Zaina Martinez (56 y.o. Female)     Date of Birth   1965    Social Security Number       Address   [address] [place] [address]    Home Phone   [phone]    MRN   [id number]       Restorationism   None    Marital Status                               Admission Date   10/12/22    Admission Type   Emergency    Admitting Provider       Attending Provider   Eddie Givens MD    Department, Room/Bed   [address], E567/1       Discharge Date       Discharge Disposition       Discharge Destination                               Attending Provider: Eddie Givens MD    Allergies: Contrast Dye, Ibuprofen, Prochlorperazine    Isolation: None   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)   Code Status: CPR    Ht: 157.5 cm (62\")   Wt: 56.6 kg (124 lb 12.5 oz)    Admission Cmt: None   Principal Problem: Hyperkalemia [E87.5]                 Active Insurance as of 10/12/2022     Primary Coverage     Payor Plan Insurance Group Employer/Plan Group    ANTHEM MEDICARE REPLACEMENT ANTHEM MEDICARE ADVANTAGE [id number]     Payor Plan Address Payor Plan Phone Number Payor Plan Fax Number Effective Dates    [address] [phone]  1/1/2019 - None Entered    [facility] [id number]       Subscriber Name Subscriber Birth Date Member ID       ZAINA MARTINEZ 1965 [id number]                 Emergency Contacts      (Rel.) Home Phone Work Phone Mobile Phone    Marv Martinez (Spouse) [phone] -- [phone]    JuanFiona bradford (Daughter) [phone] -- [phone]              "

## 2022-10-14 NOTE — PROGRESS NOTES
[facility] Progress Note      Patient Name: Zaina Martinez  : 1965  MRN: [id number]  Primary Care Physician:  Jane Kyle NP  Date of admission: 10/12/2022    Subjective     Interval History:   Seen and examined on HD; no cramping or dizziness  Denies palpitations though heart rates 130-140  Breathing is comfortable on room air; no chest pain  Asking if she can go home    Review of Systems:   As noted above    Objective     Vitals:   Temp:  [98 °F (36.7 °C)-98.6 °F (37 °C)] 98 °F (36.7 °C)  Heart Rate:  [] 93  Resp:  [16] 16  BP: (114-150)/() 141/99  Flow (L/min):  [1] 1    Intake/Output Summary (Last 24 hours) at 10/14/2022 1537  Last data filed at 10/14/2022 1300  Gross per 24 hour   Intake 716 ml   Output 100 ml   Net 616 ml       Physical Exam:    Constitutional: Awake, alert, NAD, chronically ill  Qb 400, 145/92, , fluid removal goal 3 L  HEENT: Sclera anicteric, no conjunctival injection  Neck: Supple, no carotid bruit, trachea at midline, no JVD  Resp: No BS in lower bases, few crackles midfields, nonlabored   Vascular: Right chest TDC  Cardiovascular: Irregularly irregular, tachycardic, 2/6M, no rub  Gastrointestinal: BS +, soft, nontender, +distended  : No palpable bladder  Musculoskeletal:  No significant edema, + clubbing  Psychiatric: Flat affect, depressed mood, cooperative, oriented  Neurologic: moving all extremities, normal speech and mental status  Skin: Warm and dry    Scheduled Meds:     apixaban, 5 mg, Oral, Q12H  aspirin, 81 mg, Oral, Daily  carvedilol, 25 mg, Oral, BID With Meals  dilTIAZem CD, 180 mg, Oral, Q24H  folic acid, 1 mg, Oral, Daily  insulin glargine, 12 Units, Subcutaneous, Nightly  insulin lispro, 0-7 Units, Subcutaneous, TID AC  levothyroxine, 250 mcg, Oral, Q AM  pantoprazole, 40 mg, Oral, Daily  rOPINIRole, 1 mg, Oral, Nightly  sertraline, 150 mg, Oral, Daily      IV Meds:        Results Reviewed:   I have personally  reviewed the results from the time of this admission to 10/14/2022 15:37 EDT     Results from last 7 days   Lab Units 10/14/22  0612 10/13/22  0827 10/12/22  2351 10/12/22  1811   SODIUM mmol/L 128* 130* 125* 129*   POTASSIUM mmol/L 5.0 4.2 6.8* 6.7*   CHLORIDE mmol/L 90* 94* 91* 90*   CO2 mmol/L 25.9 26.0 18.5* 19.9*   BUN mg/dL 37* 19 45* 44*   CREATININE mg/dL 3.42* 2.44* 4.76* 4.93*   CALCIUM mg/dL 7.8* 8.1* 8.5* 8.5*   BILIRUBIN mg/dL  --   --   --  0.4   ALK PHOS U/L  --   --   --  300*   ALT (SGPT) U/L  --   --   --  26   AST (SGOT) U/L  --   --   --  23   GLUCOSE mg/dL 245* 290* 344* 441*       Estimated Creatinine Clearance: 16.4 mL/min (A) (by C-G formula based on SCr of 3.42 mg/dL (H)).    Results from last 7 days   Lab Units 10/12/22  2351   MAGNESIUM mg/dL 2.0             Results from last 7 days   Lab Units 10/13/22  0827 10/12/22  1811   WBC 10*3/mm3 8.15 9.16   HEMOGLOBIN g/dL 12.2 13.2   PLATELETS 10*3/mm3 219 234             Assessment / Plan     ASSESSMENT:  1.  ESRD: volume excess, improving; high-normal potassium; low serum sodium level with hypervolemia and hyperglycemia  2.  Volume overload with bilateral pleural effusions, ascites, and anasarca; has chronic small pericardial effusion, with August '22 echo negative for tamponade  3.  Atrial fibrillation/flutter with rapid rate  4.  Hypertension of ESRD, exacerbated by volume overload  5.  History of noncompliance with fluid restriction, potassium restriction, and medications in general  6.  Diabetes mellitus with renal complications; uncontrolled  7.  Anemia of ESRD: hemoglobin above goal  8.  Recent left-sided hydroureteronephrosis with an abrupt transition point in left ureter:  followed by urology  9.  Hypothyroidism, severe:  noncompliant with thyroid medication    PLAN:  1.  HD with UF underway now, though will abort treatment if heart rates remain above 140 or if any hypotension develops  2.  Needs better rate control    Thank you for  involving us in the care of Zaina Martinez.  Please feel free to call with any questions.    Alejo Lange MD  10/14/22  15:37 EDT    [facility]  [phone]      Much of this encounter note is an electronic transcription/translation of spoken language to printed text. The electronic translation of spoken language may permit erroneous, or at times, nonsensical words or phrases to be inadvertently transcribed; Although I have reviewed the note for such errors, some may still exist.

## 2022-10-14 NOTE — PLAN OF CARE
Goal Outcome Evaluation:  Plan of Care Reviewed With: patient           Outcome Evaluation: Pt admitted from home withweakness and nausea and hyperkalemia. At baseline, pt uses a rwx or a w/c for mobility.  Pt presents with generalized weakness, decreased endurance and unstyeady gait. PT would benefit from PT to address these impairments. Pt plans to d/c home when medically stable. She had a bad experience at a subacute facility in the past and does not want to go there.

## 2022-10-14 NOTE — PLAN OF CARE
Goal Outcome Evaluation:  Plan of Care Reviewed With: patient  Progress: improving  Outcome Evaluation: VSS, no c/o pain, no signs of distress. HD today. Insulin adjusted. Remains A-flutter on monitor. IJ cath patency maintained. Remains RA. BAR consulted  Problem: Fall Injury Risk  Goal: Absence of Fall and Fall-Related Injury  Outcome: Ongoing, Progressing  Intervention: Identify and Manage Contributors  Recent Flowsheet Documentation  Taken 10/14/2022 1236 by Laina Friedman RN  Medication Review/Management: medications reviewed  Taken 10/14/2022 0855 by Laina Friedman RN  Medication Review/Management: medications reviewed  Intervention: Promote Injury-Free Environment  Recent Flowsheet Documentation  Taken 10/14/2022 1400 by Laina Friedman RN  Safety Promotion/Fall Prevention: patient off unit  Taken 10/14/2022 1306 by Laina Friedman RN  Safety Promotion/Fall Prevention: patient off unit  Taken 10/14/2022 1236 by Laina Friedman RN  Safety Promotion/Fall Prevention:   room organization consistent   safety round/check completed   activity supervised   assistive device/personal items within reach   clutter free environment maintained   fall prevention program maintained  Taken 10/14/2022 1006 by Laina Friedman RN  Safety Promotion/Fall Prevention:   safety round/check completed   room organization consistent  Taken 10/14/2022 0855 by Laina Friedman RN  Safety Promotion/Fall Prevention:   safety round/check completed   room organization consistent   activity supervised   assistive device/personal items within reach   clutter free environment maintained   fall prevention program maintained   lighting adjusted   nonskid shoes/slippers when out of bed     Problem: Adult Inpatient Plan of Care  Goal: Plan of Care Review  Outcome: Ongoing, Progressing  Flowsheets (Taken 10/14/2022 1545)  Progress: improving  Plan of Care Reviewed With: patient  Outcome Evaluation:  VSS, no c/o pain, no signs of distress. HD today. Insulin adjusted. Remains A-flutter on monitor. IJ cath patency maintained. Remains RA. BAR consulted  Goal: Patient-Specific Goal (Individualized)  Outcome: Ongoing, Progressing  Goal: Absence of Hospital-Acquired Illness or Injury  Outcome: Ongoing, Progressing  Intervention: Identify and Manage Fall Risk  Recent Flowsheet Documentation  Taken 10/14/2022 1400 by Laina Friedman RN  Safety Promotion/Fall Prevention: patient off unit  Taken 10/14/2022 1306 by Laina Friedman RN  Safety Promotion/Fall Prevention: patient off unit  Taken 10/14/2022 1236 by Laina Friedman RN  Safety Promotion/Fall Prevention:   room organization consistent   safety round/check completed   activity supervised   assistive device/personal items within reach   clutter free environment maintained   fall prevention program maintained  Taken 10/14/2022 1006 by Laina Friedman RN  Safety Promotion/Fall Prevention:   safety round/check completed   room organization consistent  Taken 10/14/2022 0855 by aLina Friedman RN  Safety Promotion/Fall Prevention:   safety round/check completed   room organization consistent   activity supervised   assistive device/personal items within reach   clutter free environment maintained   fall prevention program maintained   lighting adjusted   nonskid shoes/slippers when out of bed  Intervention: Prevent Skin Injury  Recent Flowsheet Documentation  Taken 10/14/2022 1236 by Laina Friedman RN  Body Position: position changed independently  Taken 10/14/2022 1006 by Laina Friedman RN  Body Position: position changed independently  Taken 10/14/2022 0855 by Laina Friedman RN  Body Position:   left   side-lying   position changed independently  Intervention: Prevent and Manage VTE (Venous Thromboembolism) Risk  Recent Flowsheet Documentation  Taken 10/14/2022 1236 by Laina Friedman RN  Activity  Management:   activity adjusted per tolerance   activity encouraged  Taken 10/14/2022 1006 by Laina Friedman RN  Activity Management: activity adjusted per tolerance  Taken 10/14/2022 0855 by Laina Friedman RN  Activity Management: activity adjusted per tolerance  Intervention: Prevent Infection  Recent Flowsheet Documentation  Taken 10/14/2022 0855 by Laina Friedman RN  Infection Prevention: single patient room provided  Goal: Optimal Comfort and Wellbeing  Outcome: Ongoing, Progressing  Goal: Readiness for Transition of Care  Outcome: Ongoing, Progressing     Problem: Skin Injury Risk Increased  Goal: Skin Health and Integrity  Outcome: Ongoing, Progressing  Intervention: Optimize Skin Protection  Recent Flowsheet Documentation  Taken 10/14/2022 1236 by Laina Friedman RN  Head of Bed (HOB) Positioning: HOB elevated  Taken 10/14/2022 1006 by Laina Friedman RN  Head of Bed (HOB) Positioning: HOB elevated  Taken 10/14/2022 0855 by Laina Friedman RN  Head of Bed (HOB) Positioning: HOB elevated   Goal Outcome Evaluation:  Plan of Care Reviewed With: patient        Progress: improving  Outcome Evaluation: VSS, no c/o pain, no signs of distress. HD today. Insulin adjusted. Remains A-flutter on monitor. IJ cath patency maintained. Remains RA. BAR consulted

## 2022-10-14 NOTE — PROGRESS NOTES
Discharge Planning Assessment  [facility]     Patient Name: Zaina Martinez  MRN: [id number]  Today's Date: 10/14/2022    Admit Date: 10/12/2022    Plan: tbd   Discharge Needs Assessment     Row Name 10/14/22 1304       Living Environment    People in Home spouse    Current Living Arrangements home    Provides Primary Care For no one    Family Caregiver if Needed spouse    Quality of Family Relationships helpful;involved;supportive    Able to Return to Prior Arrangements yes       Resource/Environmental Concerns    Resource/Environmental Concerns none       Transition Planning    Patient/Family Anticipates Transition to home with family               Discharge Plan     Row Name 10/14/22 1309       Plan    Plan tbd    Plan Comments Spoke with patient, verified facesheet and discussed dc plan. Patient is from home with spouse.  She uses a wheelchair, walker, and hospital bed at home. She lives in a two story home, but resides on the first floor. There is a ramp to enter the home. Patient [facility] HD [facility]. She is current with [facility] . She has been to [facility], [facility], and [facility]. Patient is requesting a referral to [facility], referral called [facility] # [id number]. Patient does not have back up rehab choices, she states she will likely return home with spouse and [facility] HH if [facility] is unable to accept. Pre-cert is required for SNF. CCP will follow up.              Continued Care and Services - Admitted Since 10/12/2022    Coordination has not been started for this encounter.     Selected Continued Care - Prior Encounters Includes continued care and service providers with selected services from prior encounters from 7/14/2022 to 10/14/2022    Discharged on 10/6/2022 Admission date: 9/26/2022 - Discharge disposition: Home-Health Care Svc    Durable Medical Equipment     Service Provider Selected Services Address Phone Fax Patient Preferred    [facility] [address]  [facility] [address] [phone] --          Home Medical Care     Service Provider Selected Services Address Phone Fax Patient Preferred    [facility] [address] [address] [phone] --                Discharged on 9/13/2022 Admission date: 9/3/2022 - Discharge disposition: Home-Health Care Svc    Durable Medical Equipment     Service Provider Selected Services Address Phone Fax Patient Preferred    [facility] D[facility] Medical Equipment [address] [facility] [address] [phone] --          Home Medical Care     Service Provider Selected Services Address Phone Fax Patient Preferred    [facility] [address] [place] [id number] [phone] --                Discharged on 8/27/2022 Admission date: 8/19/2022 - Discharge disposition: Home-Health Care Svc    Durable Medical Equipment     Service Provider Selected Services Address Phone Fax Patient Preferred    [facility] Medical [facility] [address] [facility] [address] [phone] --          Home Medical Care     Service Provider Selected Services Address Phone Fax Patient Preferred    [facility] [address] [place] [id number] [phone] --                Discharged on 8/4/2022 Admission date: 7/27/2022 - Discharge disposition: Home-Health Care Svc    Dialysis/Infusion     Service Provider Selected Services Address Phone Fax Patient Preferred    [facility] [address] [address] [phone] --          Home Medical Care     Service Provider Selected Services Address Phone Fax Patient Preferred    [facility] [address] [address]  [phone] --                    Expected Discharge Date and Time     Expected Discharge Date Expected Discharge Time    Oct 15, 2022          Demographic Summary    No documentation.                Functional Status    No documentation.                Psychosocial    No documentation.                Abuse/Neglect    No documentation.                Legal    No documentation.                Substance Abuse    No documentation.                Patient Forms    No documentation.                   Anel Adams RN

## 2022-10-14 NOTE — THERAPY EVALUATION
Patient Name: Zaina Martinez  : 1965    MRN: [id number]                              Today's Date: 10/13/2022       Admit Date: 10/12/2022    Visit Dx:     ICD-10-CM ICD-9-CM   1. Hyperkalemia  E87.5 276.7   2. Nausea and vomiting, unspecified vomiting type  R11.2 787.01   3. ESRD needing dialysis ([facility])  N18.6 585.6    Z99.2    4. Uncontrolled type 2 diabetes mellitus with hyperglycemia ([facility])  E11.65 250.02   5. Hyponatremia  E87.1 276.1     Patient Active Problem List   Diagnosis   • Renal insufficiency   • Hypertensive disorder   • Hypothyroidism   • Type 2 diabetes mellitus with kidney complication, with long-term current use of insulin ([facility])   • Rheumatoid arthritis ([facility])   • Angioedema   • Esophageal dysmotility   • Anemia   • Medically noncompliant   • Myocardial infarction due to demand ischemia ([facility])   • Enteritis   • PRES (posterior reversible encephalopathy syndrome)   • Urine retention   • Klebsiella infection   • Superficial thrombophlebitis   • Generalized weakness   • ESRD (end stage renal disease) ([facility])   • CAD (coronary artery disease)   • Abnormal urinalysis   • Chronic diastolic CHF (congestive heart failure) ([facility])   • Pyelonephritis   • Calculus of gallbladder with acute on chronic cholecystitis without obstruction   • Pleural effusion on right   • Anemia due to chronic kidney disease, on chronic dialysis ([facility])   • Abnormal findings on diagnostic imaging of other specified body structures   • Acute upper respiratory infection   • Agitation   • Alkaline phosphatase raised   • Casts present in urine   • Cellulitis of toe   • Hip pain   • Community acquired pneumonia   • Depressive disorder   • Diarrhea of presumed infectious origin   • Difficult or painful urination   • Disease due to severe acute respiratory syndrome coronavirus 2 (SARS-CoV-2)   • Dyspnea   • Encounter for follow-up examination after completed treatment for conditions other than malignant neoplasm   • H/O: hypothyroidism   •  Hyperlipidemia   • Hypomagnesemia   • Intractable vomiting with nausea   • Leukocytosis   • Luetscher's syndrome   • Need for influenza vaccination   • Restless legs   • Noncompliance with treatment   • Shoulder pain   • Acute UTI (urinary tract infection)   • Metabolic encephalopathy   • Abnormal findings on diagnostic imaging of abdomen   • Status post cholecystectomy   • Hyponatremia   • Leukocytosis   • Acute metabolic encephalopathy   • Encephalopathy, toxic   • Acute CVA (cerebrovascular accident) (HCC)   • Intracranial hemorrhage (HCC)   • Stroke (HCC)   • Abnormal urinalysis   • Diabetic muscle infarction (HCC)   • Other insomnia   • Altered mental status   • History of stroke- R MCA s/p TPA with subsequent ICH with debility   • Heart murmur   • Bradycardia   • Left lower lobe pneumonia   • Metabolic encephalopathy   • Pericardial effusion   • Pleural effusion   • Atrial flutter (HCC)   • Chronic systolic CHF (congestive heart failure) (HCC)   • Thrombus of venous dialysis catheter (HCC)   • Sepsis without acute organ dysfunction (HCC)   • Type 2 diabetes mellitus with hyperglycemia, with long-term current use of insulin (HCC)   • Acute respiratory failure with hypoxia (HCC)   • Acute on chronic systolic CHF (congestive heart failure) (HCC)   • Hyperkalemia     Past Medical History:   Diagnosis Date   • Acute CVA (cerebrovascular accident) (HCC) 5/1/2022   • Acute on chronic diastolic CHF (congestive heart failure) (HCC)    • CAD (coronary artery disease) 12/20/2021   • Diabetes (HCC)    • Disease of thyroid gland    • GERD (gastroesophageal reflux disease)    • Hyperlipidemia 11/30/2018   • Hypertension    • Rheumatoid arthritis (HCC)      Past Surgical History:   Procedure Laterality Date   • CHOLECYSTECTOMY WITH INTRAOPERATIVE CHOLANGIOGRAM N/A 1/10/2022    Procedure: Laparoscopic cholecystectomy with intraoperative cholangiogram;  Surgeon: Ramana Raygoza MD;  Location: [facility];  Service:  General;  Laterality: N/A;   • CYSTOSCOPY W/ URETERAL STENT PLACEMENT Left 9/29/2022    Procedure: CYSTOSCOPY URETERAL STENT INSERTION WITH RETROGRADE PYELOGRAM;  Surgeon: Bryant Phan Jr., MD;  Location: [facility];  Service: Urology;  Laterality: Left;   • EYE SURGERY     • HYSTERECTOMY     • INSERTION HEMODIALYSIS CATHETER N/A 12/6/2021    Procedure: HEMODIALYSIS CATHETER INSERTION;  Surgeon: Keli Salazar MD;  Location: [facility] 18/19;  Service: Vascular;  Laterality: N/A;   • INSERTION HEMODIALYSIS CATHETER N/A 5/3/2022    Procedure: TUNNELED CATHETER PLACEMENT;  Surgeon: Keli Salazar MD;  Location: [facility] OR;  Service: Vascular;  Laterality: N/A;   • MUSCLE BIOPSY Left 6/15/2022    Procedure: Left quadriceps muscle biopsy;  Surgeon: Marques Ma MD;  Location: [facility];  Service: Neurosurgery;  Laterality: Left;      General Information     Row Name 10/13/22 2140          Physical Therapy Time and Intention    Document Type evaluation  -     Mode of Treatment physical therapy;individual therapy  -     Row Name 10/13/22 2140          General Information    Patient Profile Reviewed yes  -     Prior Level of Function independent:  -     Existing Precautions/Restrictions fall  -     Barriers to Rehab previous functional deficit  -     Row Name 10/13/22 2140          Living Environment    People in Home spouse  -     Row Name 10/13/22 2140          Home Main Entrance    Number of Stairs, Main Entrance --  has a ramp  -     Row Name 10/13/22 2140          Cognition    Orientation Status (Cognition) oriented x 4  -     Row Name 10/13/22 2140          Safety Issues, Functional Mobility    Impairments Affecting Function (Mobility) endurance/activity tolerance;strength  -           User Key  (r) = Recorded By, (t) = Taken By, (c) = Cosigned By    Initials Name Provider Type    Guillermina Perera, PT Physical Therapist               Mobility      [facility] Name 10/13/22 2141          Bed Mobility    Bed Mobility supine-sit;sit-supine  -     Supine-Sit [place] (Bed Mobility) standby assist  -     Sit-Supine [place] (Bed Mobility) contact guard  -     Assistive Device (Bed Mobility) bed rails;draw sheet  -[facility] Name 10/13/22 2141          Sit-Stand Transfer    Sit-Stand [place] (Transfers) minimum assist (75% patient effort)  -     Assistive Device (Sit-Stand Transfers) walker, front-wheeled  -[facility] Name 10/13/22 2141          Gait/Stairs (Locomotion)    [place] Level (Gait) minimum assist (75% patient effort)  -     Assistive Device (Gait) walker, front-wheeled  -     Distance in Feet (Gait) 20 ft x 2 with seated rest break  -     Deviations/Abnormal Patterns (Gait) stride length decreased;gait speed decreased;base of support, narrow  -           User Key  (r) = Recorded By, (t) = Taken By, (c) = Cosigned By    Initials Name Provider Type    Guillermina Perera, PT Physical Therapist               Obj/Interventions     [facility] Name 10/13/22 2142          Range of Motion Comprehensive    General Range of Motion no range of motion deficits identified  -KH     Row Name 10/13/22 2142          Strength Comprehensive (MMT)    Comment, General Manual Muscle Testing (MMT) Assessment generalized weakness  -KH     Row Name 10/13/22 2142          Motor Skills    Therapeutic Exercise --  BLE AP, LAQ, seated marching  -KH     Row Name 10/13/22 2142          Balance    Balance Assessment sitting static balance;sitting dynamic balance;standing static balance;standing dynamic balance  -     Static Sitting Balance standby assist  -     Dynamic Sitting Balance contact guard  -     Static Standing Balance contact guard  -     Dynamic Standing Balance minimal assist  -           User Key  (r) = Recorded By, (t) = Taken By, (c) = Cosigned By    Initials Name Provider Type    Guillermina Perera, PT Physical Therapist                Goals/Plan     Row Name 10/13/22 2146          Transfer Goal 1 (PT)    Activity/Assistive Device (Transfer Goal 1, PT) transfers, all;walker, rolling  -KH     [place] Level/Cues Needed (Transfer Goal 1, PT) independent  -KH     Time Frame (Transfer Goal 1, PT) 1 week  -     Row Name 10/13/22 2146          Gait Training Goal 1 (PT)    Activity/Assistive Device (Gait Training Goal 1, PT) gait (walking locomotion);walker, rolling  -KH     [place] Level (Gait Training Goal 1, PT) independent  -KH     Distance (Gait Training Goal 1, PT) 75 ft  -KH     Time Frame (Gait Training Goal 1, PT) 1 week  -     Row Name 10/13/22 2146          Patient Education Goal (PT)    Activity (Patient Education Goal, PT) HEP  -KH     [place]/Cues/Accuracy (Memory Goal 2, PT) demonstrates adequately  -KH     Time Frame (Patient Education Goal, PT) 1 week  -     Row Name 10/13/22 2146          Therapy Assessment/Plan (PT)    Planned Therapy Interventions (PT) balance training;bed mobility training;gait training;home exercise program;patient/family education;transfer training;strengthening  -KH           User Key  (r) = Recorded By, (t) = Taken By, (c) = Cosigned By    Initials Name Provider Type    Guillermina Perera, PT Physical Therapist               Clinical Impression     Row Name 10/13/22 2143          Pain    Pretreatment Pain Rating 0/10 - no pain  -     Posttreatment Pain Rating 0/10 - no pain  -     Row Name 10/13/22 2143          Plan of Care Review    Plan of Care Reviewed With patient  -     Outcome Evaluation Pt admitted from home withweakness and nausea and hyperkalemia. At baseline, pt uses a rwx or a w/c for mobility.  Pt presents with generalized weakness, decreased endurance and unstyeady gait. PT would benefit from PT to address these impairments. Pt plans to d/c home when medically stable. She had a bad experience at a subacute facility in the past and does not want to go  there.  -     Row Name 10/13/22 2143          Therapy Assessment/Plan (PT)    Patient/Family Therapy Goals Statement (PT) return home to PLOF  -     Rehab Potential (PT) good, to achieve stated therapy goals  -     Criteria for Skilled Interventions Met (PT) yes  -     Therapy Frequency (PT) 5 times/wk  -     Row Name 10/13/22 2143          Positioning and Restraints    Pre-Treatment Position in bed  -     Post Treatment Position bed  -     In Bed fowlers;call light within reach;encouraged to call for assist;exit alarm on  -           User Key  (r) = Recorded By, (t) = Taken By, (c) = Cosigned By    Initials Name Provider Type    Guillermina Perera, VICKIE Physical Therapist               Outcome Measures    No documentation.                              Physical Therapy Education     Title: PT OT SLP Therapies (Done)     Topic: Physical Therapy (Done)     Point: Mobility training (Done)     Learning Progress Summary           Patient Acceptance, E, VU,NR by  at 10/13/2022 2147                   Point: Home exercise program (Done)     Learning Progress Summary           Patient Acceptance, E, VU,NR by  at 10/13/2022 2147                   Point: Body mechanics (Done)     Learning Progress Summary           Patient Acceptance, E, VU,NR by  at 10/13/2022 2147                   Point: Precautions (Done)     Learning Progress Summary           Patient Acceptance, E, VU,NR by  at 10/13/2022 2147                               User Key     Initials Effective Dates Name Provider Type [facility] 06/16/21 -  Guillermina Nava, PT Physical Therapist PT              PT Recommendation and Plan  Planned Therapy Interventions (PT): balance training, bed mobility training, gait training, home exercise program, patient/family education, transfer training, strengthening  Plan of Care Reviewed With: patient  Outcome Evaluation: Pt admitted from home withweakness and nausea and hyperkalemia. At  baseline, pt uses a rwx or a w/c for mobility.  Pt presents with generalized weakness, decreased endurance and unstyeady gait. PT would benefit from PT to address these impairments. Pt plans to d/c home when medically stable. She had a bad experience at a subacute facility in the past and does not want to go there.     Time Calculation:    PT Charges     Row Name 10/13/22 2147 10/13/22 1604          Time Calculation    Start Time 1318  -KH --     Stop Time 1338  -KH --     Time Calculation (min) 20 min  -KH --     PT Received On 10/13/22  -KH 10/13/22  -KH     PT - Next Appointment -- 10/14/22  -KH        Time Calculation- PT    Total Timed Code Minutes- PT 10 minute(s)  -KH --        Timed Charges    26825 - PT Therapeutic Exercise Minutes 10  -KH --        Total Minutes    Timed Charges Total Minutes 10  -KH --      Total Minutes 10  -KH --           User Key  (r) = Recorded By, (t) = Taken By, (c) = Cosigned By    Initials Name Provider Type     Guillermina Nava, PT Physical Therapist              Therapy Charges for Today     Code Description Service Date Service Provider Modifiers Qty    73309016211  PT THER PROC EA 15 MIN 10/13/2022 Guillermina Nava, PT GP 1    90989946768 HC PT EVAL MOD COMPLEXITY 2 10/13/2022 Guillermina Nava, PT GP 1               Guillermina Nava, PT  10/13/2022

## 2022-10-14 NOTE — PROGRESS NOTES
Name: Zaina Martinez ADMIT: 10/12/2022   : 1965  PCP: Jane Kyle NP    MRN: [id number] LOS: 2 days   AGE/SEX: 56 y.o. female  ROOM: [facility]     Subjective   Subjective   Patient lying comfortably in bed.  About to have lunch.  Patient without any complaints.  To have dialysis this afternoon.    Review of Systems   Constitutional: Negative for chills and fever.   Respiratory: Negative for cough and shortness of breath.    Cardiovascular: Negative for chest pain and leg swelling.   Gastrointestinal: Negative for abdominal pain, diarrhea and nausea.   Skin: Negative for color change.     As above     Objective   Objective   Vital Signs  Temp:  [98 °F (36.7 °C)-98.6 °F (37 °C)] 98 °F (36.7 °C)  Heart Rate:  [] 93  Resp:  [16] 16  BP: (106-150)/() 141/99  SpO2:  [92 %-98 %] 98 %  on  Flow (L/min):  [1] 1;   Device (Oxygen Therapy): room air  Body mass index is 22.82 kg/m².  Physical Exam  Constitutional:       General: She is not in acute distress.     Appearance: She is not diaphoretic.      Comments: Right tunneled dialysis catheter.   HENT:      Head: Normocephalic and atraumatic.      Mouth/Throat:      Mouth: Mucous membranes are moist.      Pharynx: Oropharynx is clear.   Eyes:      Extraocular Movements: Extraocular movements intact.      Pupils: Pupils are equal, round, and reactive to light.   Cardiovascular:      Rate and Rhythm: Normal rate and regular rhythm.      Heart sounds: No murmur heard.    No gallop.   Pulmonary:      Effort: Pulmonary effort is normal. No respiratory distress.      Breath sounds: No wheezing.   Abdominal:      General: Abdomen is flat. Bowel sounds are normal. There is no distension.      Palpations: Abdomen is soft.      Tenderness: There is no abdominal tenderness.   Musculoskeletal:         General: No swelling or deformity. Normal range of motion.   Skin:     General: Skin is warm and dry.   Neurological:      General: No focal deficit present.       Mental Status: She is alert and oriented to person, place, and time.         Results Review     I reviewed the patient's new clinical results.  Results from last 7 days   Lab Units 10/13/22  0827 10/12/22  1811   WBC 10*3/mm3 8.15 9.16   HEMOGLOBIN g/dL 12.2 13.2   PLATELETS 10*3/mm3 219 234     Results from last 7 days   Lab Units 10/14/22  0612 10/13/22  0827 10/12/22  2351 10/12/22  1811   SODIUM mmol/L 128* 130* 125* 129*   POTASSIUM mmol/L 5.0 4.2 6.8* 6.7*   CHLORIDE mmol/L 90* 94* 91* 90*   CO2 mmol/L 25.9 26.0 18.5* 19.9*   BUN mg/dL 37* 19 45* 44*   CREATININE mg/dL 3.42* 2.44* 4.76* 4.93*   GLUCOSE mg/dL 245* 290* 344* 441*   Estimated Creatinine Clearance: 16.4 mL/min (A) (by C-G formula based on SCr of 3.42 mg/dL (H)).  Results from last 7 days   Lab Units 10/12/22  1811   ALBUMIN g/dL 3.30*   BILIRUBIN mg/dL 0.4   ALK PHOS U/L 300*   AST (SGOT) U/L 23   ALT (SGPT) U/L 26     Results from last 7 days   Lab Units 10/14/22  0612 10/13/22  0827 10/12/22  2351 10/12/22  1811   CALCIUM mg/dL 7.8* 8.1* 8.5* 8.5*   ALBUMIN g/dL  --   --   --  3.30*   MAGNESIUM mg/dL  --   --  2.0  --        COVID19   Date Value Ref Range Status   09/03/2022 Not Detected Not Detected - Ref. Range Final   08/25/2022 Not Detected Not Detected - Ref. Range Final     Hemoglobin A1C   Date/Time Value Ref Range Status   10/14/2022 0612 9.70 (H) 4.80 - 5.60 % Final     Glucose   Date/Time Value Ref Range Status   10/14/2022 1108 138 (H) 70 - 130 mg/dL Final     Comment:     Meter: [id number] : [id number] Roshan SAAVEDRA   10/14/2022 0607 240 (H) 70 - 130 mg/dL Final     Comment:     Meter: [id number] : [id number] Evelio Cohen NA   10/14/2022 0335 164 (H) 70 - 130 mg/dL Final     Comment:     Meter: [id number] : [id number] Evelio SAAVEDRA   10/13/2022 2036 405 (C) 70 - 130 mg/dL Final     Comment:     Repeat Test Meter: [id number] : [id number] Evelio SAAVEDRA   10/13/2022 1528 348 (H) 70 - 130 mg/dL Final      Comment:     Meter: [id number] : [id number] Satnam SAAVEDRA   10/13/2022 1142 376 (H) 70 - 130 mg/dL Final     Comment:     Meter: [id number] : [id number] Satnam SAAVEDRA   10/13/2022 0601 183 (H) 70 - 130 mg/dL Final     Comment:     Meter: [id number] : [id number] Evelio SAAVEDRA       CT Abdomen Pelvis Without Contrast  Narrative: ABDOMEN AND PELVIS CT WITHOUT CONTRAST     HISTORY: Nausea vomiting and lipase, lengthy list of medical issues.     Radiation dose reduction techniques were utilized, including automated  exposure control and exposure modulation based on body size.     Noncontrast abdomen and pelvis CT is provided. Correlation with  noncontrast CT July 27, 2022.     FINDINGS: Atelectasis or pneumonia in the posterior lung bases  bilaterally with bilateral pleural effusions and diffuse third spacing.  Left ureteral stent is positioned as expected. No hydronephrosis.  Extensive atheromatous vascular calcification with no abnormally dilated  bowel. Prior cholecystectomy. Renal parenchyma appears somewhat  atrophic. Solid and parenchyma otherwise appears normal. Partly  calcified, well-demarcated lesion in the mesentery 31 x 19 mm is smaller  and more mineralized today than on July 10 exam and may represent a  small aging hematoma.     Chronic T12 superior plate deformity with mild loss of height  anteriorly; no paraspinal inflammatory change.     Impression: Diffuse third spacing with pleural effusions and atelectasis  versus pneumonia the lung bases. Left ureteral stent is positioned as  expected. No acute abnormality identified.     This report was finalized on 10/12/2022 8:11 PM by Dr. Adonis Burch M.D.     XR Chest 1 View  Narrative: PORTABLE CHEST X-RAY     HISTORY: Tachycardia.     TECHNIQUE: Oblique portable chest x-ray correlated chest x-ray September 26.     FINDINGS: Right IJ dialysis catheter is again observed. There is  pulmonary vascular engorgement and pulmonary edema  suspected small  pleural effusions bilaterally and pneumonia or atelectasis in the left  retrocardiac lung base. The appearance is similar to that observed on  x-ray last month.     Impression: Volume overload with pulmonary edema and possible left base  atelectasis or pneumonia.     This report was finalized on 10/12/2022 6:40 PM by Dr. Adonis Burch M.D.       I reviewed the patient's daily medications.  Scheduled Medications  apixaban, 5 mg, Oral, Q12H  aspirin, 81 mg, Oral, Daily  carvedilol, 25 mg, Oral, BID With Meals  dilTIAZem CD, 180 mg, Oral, Q24H  folic acid, 1 mg, Oral, Daily  insulin glargine, 12 Units, Subcutaneous, Nightly  insulin lispro, 0-7 Units, Subcutaneous, TID AC  levothyroxine, 250 mcg, Oral, Q AM  pantoprazole, 40 mg, Oral, Daily  rOPINIRole, 1 mg, Oral, Nightly  sertraline, 150 mg, Oral, Daily    Infusions   Diet  Diet Regular; Consistent Carbohydrate, Renal, Low Potassium, Daily Fluid Restriction; Other; 1,200; 2 gm (50 mEq)         I have personally reviewed:  [x]  Laboratory   []  Microbiology   []  Radiology   []  EKG/Telemetry   []  Cardiology/Vascular   []  Pathology   [x]  Records     Assessment/Plan     Active Hospital Problems    Diagnosis  POA   • **Hyperkalemia [E87.5]  Yes   • Status post cholecystectomy [Z90.49]  Not Applicable   • Hyponatremia [E87.1]  Yes   • ESRD (end stage renal disease) ([facility]) [N18.6]  Yes   • Generalized weakness [R53.1]  Yes   • Type 2 diabetes mellitus with kidney complication, with long-term current use of insulin ([facility]) [E11.29, Z79.4]  Not Applicable      Resolved Hospital Problems   No resolved problems to display.       56 y.o. female admitted with Hyperkalemia.    ESRD on hemodialysis with hypokalemia and hyponatremia: Dialysis resumed.  Nephrology input appreciated.  Hypoxic respiratory failure, resolved:   Secondary to fluid overload from his analysis.    Atrial flutter:  Resume Coreg and diltiazem.  Resume Eliquis  Type 2 diabetes:   Poor  control.  Increase at bedtime Lantus to 12 units.  Monitor.  Hypothyroid: TSH 79.  Reported on 250 mcg of levothyroxine daily.  Suspect noncompliance.    Used to see endocrinologist, but they retired.  Will send ambulatory referral on discharge.  Elevated troponin-patient without any chest pain.  Troponins downtrending and actually less than the normal when patient is fluid overloaded.    · Eliquis (home med) for DVT prophylaxis.  · Full code.  · Discussed with patient and nursing staff.  · Anticipate discharge Home versus BAR timing yet to be determined.      Eddie Givens MD  [facility]  10/14/22  13:22 EDT

## 2022-10-14 NOTE — PLAN OF CARE
Goal Outcome Evaluation:  Plan of Care Reviewed With: patient           Outcome Evaluation: Pt is off O2 today. She tolerated increased ambulation distance with no rest break required. Continues to fatigue quickly

## 2022-10-14 NOTE — THERAPY TREATMENT NOTE
Patient Name: Zaina Martinez  : 1965    MRN: [id number]                              Today's Date: 10/14/2022       Admit Date: 10/12/2022    Visit Dx:     ICD-10-CM ICD-9-CM   1. Hyperkalemia  E87.5 276.7   2. Nausea and vomiting, unspecified vomiting type  R11.2 787.01   3. ESRD needing dialysis ([facility])  N18.6 585.6    Z99.2    4. Uncontrolled type 2 diabetes mellitus with hyperglycemia ([facility])  E11.65 250.02   5. Hyponatremia  E87.1 276.1     Patient Active Problem List   Diagnosis   • Renal insufficiency   • Hypertensive disorder   • Hypothyroidism   • Type 2 diabetes mellitus with kidney complication, with long-term current use of insulin ([facility])   • Rheumatoid arthritis ([facility])   • Angioedema   • Esophageal dysmotility   • Anemia   • Medically noncompliant   • Myocardial infarction due to demand ischemia ([facility])   • Enteritis   • PRES (posterior reversible encephalopathy syndrome)   • Urine retention   • Klebsiella infection   • Superficial thrombophlebitis   • Generalized weakness   • ESRD (end stage renal disease) ([facility])   • CAD (coronary artery disease)   • Abnormal urinalysis   • Chronic diastolic CHF (congestive heart failure) ([facility])   • Pyelonephritis   • Calculus of gallbladder with acute on chronic cholecystitis without obstruction   • Pleural effusion on right   • Anemia due to chronic kidney disease, on chronic dialysis ([facility])   • Abnormal findings on diagnostic imaging of other specified body structures   • Acute upper respiratory infection   • Agitation   • Alkaline phosphatase raised   • Casts present in urine   • Cellulitis of toe   • Hip pain   • Community acquired pneumonia   • Depressive disorder   • Diarrhea of presumed infectious origin   • Difficult or painful urination   • Disease due to severe acute respiratory syndrome coronavirus 2 (SARS-CoV-2)   • Dyspnea   • Encounter for follow-up examination after completed treatment for conditions other than malignant neoplasm   • H/O: hypothyroidism   •  Hyperlipidemia   • Hypomagnesemia   • Intractable vomiting with nausea   • Leukocytosis   • Luetscher's syndrome   • Need for influenza vaccination   • Restless legs   • Noncompliance with treatment   • Shoulder pain   • Acute UTI (urinary tract infection)   • Metabolic encephalopathy   • Abnormal findings on diagnostic imaging of abdomen   • Status post cholecystectomy   • Hyponatremia   • Leukocytosis   • Acute metabolic encephalopathy   • Encephalopathy, toxic   • Acute CVA (cerebrovascular accident) (HCC)   • Intracranial hemorrhage (HCC)   • Stroke (HCC)   • Abnormal urinalysis   • Diabetic muscle infarction (HCC)   • Other insomnia   • Altered mental status   • History of stroke- R MCA s/p TPA with subsequent ICH with debility   • Heart murmur   • Bradycardia   • Left lower lobe pneumonia   • Metabolic encephalopathy   • Pericardial effusion   • Pleural effusion   • Atrial flutter (HCC)   • Chronic systolic CHF (congestive heart failure) (HCC)   • Thrombus of venous dialysis catheter (HCC)   • Sepsis without acute organ dysfunction (HCC)   • Type 2 diabetes mellitus with hyperglycemia, with long-term current use of insulin (HCC)   • Acute respiratory failure with hypoxia (HCC)   • Acute on chronic systolic CHF (congestive heart failure) (HCC)   • Hyperkalemia     Past Medical History:   Diagnosis Date   • Acute CVA (cerebrovascular accident) (HCC) 5/1/2022   • Acute on chronic diastolic CHF (congestive heart failure) (HCC)    • CAD (coronary artery disease) 12/20/2021   • Diabetes (HCC)    • Disease of thyroid gland    • GERD (gastroesophageal reflux disease)    • Hyperlipidemia 11/30/2018   • Hypertension    • Rheumatoid arthritis (HCC)      Past Surgical History:   Procedure Laterality Date   • CHOLECYSTECTOMY WITH INTRAOPERATIVE CHOLANGIOGRAM N/A 1/10/2022    Procedure: Laparoscopic cholecystectomy with intraoperative cholangiogram;  Surgeon: Ramana Raygoza MD;  Location: [facility];  Service:  General;  Laterality: N/A;   • CYSTOSCOPY W/ URETERAL STENT PLACEMENT Left 9/29/2022    Procedure: CYSTOSCOPY URETERAL STENT INSERTION WITH RETROGRADE PYELOGRAM;  Surgeon: Bryant Phan Jr., MD;  Location: [facility];  Service: Urology;  Laterality: Left;   • EYE SURGERY     • HYSTERECTOMY     • INSERTION HEMODIALYSIS CATHETER N/A 12/6/2021    Procedure: HEMODIALYSIS CATHETER INSERTION;  Surgeon: Keli Salazar MD;  [facility]: [facility] 18/19;  Service: Vascular;  Laterality: N/A;   • INSERTION HEMODIALYSIS CATHETER N/A 5/3/2022    Procedure: TUNNELED CATHETER PLACEMENT;  Surgeon: Keli Salazar MD;  Location: [facility] OR;  Service: Vascular;  Laterality: N/A;   • MUSCLE BIOPSY Left 6/15/2022    Procedure: Left quadriceps muscle biopsy;  Surgeon: Marques Ma MD;  Location: [facility];  Service: Neurosurgery;  Laterality: Left;      General Information     Row Name 10/14/22 0952          Physical Therapy Time and Intention    Document Type therapy note (daily note)  -     Mode of Treatment physical therapy;individual therapy  -           User Key  (r) = Recorded By, (t) = Taken By, (c) = Cosigned By    Initials Name Provider Type    Guillermina Perera, PT Physical Therapist               Mobility     Row Name 10/14/22 0952          Bed Mobility    Bed Mobility supine-sit;sit-supine  -     Supine-Sit [place] (Bed Mobility) standby assist  -     Sit-Supine [place] (Bed Mobility) standby assist  -     Assistive Device (Bed Mobility) bed rails;draw sheet  -     Row Name 10/14/22 0952          Sit-Stand Transfer    Sit-Stand [place] (Transfers) minimum assist (75% patient effort)  -     Assistive Device (Sit-Stand Transfers) walker, front-wheeled  -     Row Name 10/14/22 0952          Gait/Stairs (Locomotion)    [place] Level (Gait) minimum assist (75% patient effort)  -     Assistive Device (Gait) walker, front-wheeled  -     Distance  in Feet (Gait) 40 ft no rest break  -     Deviations/Abnormal Patterns (Gait) stride length decreased;gait speed decreased;base of support, narrow  -     Comment, (Gait/Stairs) remains slow and unsteady  -           User Key  (r) = Recorded By, (t) = Taken By, (c) = Cosigned By    Initials Name Provider Type    Guillermina Perera, VICKIE Physical Therapist               Obj/Interventions     [facility] Name 10/14/22 0952          Range of Motion Comprehensive    General Range of Motion no range of motion deficits identified  -[facility] Name 10/14/22 0952          Motor Skills    Therapeutic Exercise --  BLE AP, LAQ  -           User Key  (r) = Recorded By, (t) = Taken By, (c) = Cosigned By    Initials Name Provider Type    Guillermina Perera PT Physical Therapist               Goals/Plan    No documentation.                Clinical Impression     [facility] Name 10/14/22 0953          Pain    Pretreatment Pain Rating 0/10 - no pain  -     Posttreatment Pain Rating 0/10 - no pain  -[facility] Name 10/14/22 0953          Plan of Care Review    Plan of Care Reviewed With patient  -     Outcome Evaluation Pt is off O2 today. She tolerated increased ambulation distance with no rest break required. Continues to fatigue quickly  -[facility] Name 10/14/22 0955          Therapy Assessment/Plan (PT)    Therapy Frequency (PT) 6 times/wk  -[facility] Name 10/14/22 0953          Positioning and Restraints    Pre-Treatment Position in bed  -     Post Treatment Position bed  -     In Bed fowlers;call light within reach;encouraged to call for assist;exit alarm on  -           User Key  (r) = Recorded By, (t) = Taken By, (c) = Cosigned By    Initials Name Provider Type    Guillermina Perera PT Physical Therapist               Outcome Measures     Row Name 10/14/22 0954          How much help from another person do you currently need...    Turning from your back to your side while in flat bed without using  bedrails? 4  -KH     Moving from lying on back to sitting on the side of a flat bed without bedrails? 4  -KH     Moving to and from a bed to a chair (including a wheelchair)? 3  -KH     Standing up from a chair using your arms (e.g., wheelchair, bedside chair)? 3  -KH     Climbing 3-5 steps with a railing? 2  -KH     To walk in hospital room? 3  -KH     AM-PAC 6 Clicks Score (PT) 19  -KH     Highest level of mobility 6 --> Walked 10 steps or more  -     Row Name 10/14/22 0954          Functional Assessment    Outcome Measure Options AM-PAC 6 Clicks Basic Mobility (PT)  -           User Key  (r) = Recorded By, (t) = Taken By, (c) = Cosigned By    Initials Name Provider Type    Guillermina Perera, PT Physical Therapist                             Physical Therapy Education     Title: PT OT SLP Therapies (Done)     Topic: Physical Therapy (Done)     Point: Mobility training (Done)     Learning Progress Summary           Patient Acceptance, E, VU,NR by  at 10/14/2022 0954    Acceptance, E, VU,NR by  at 10/13/2022 2147                   Point: Home exercise program (Done)     Learning Progress Summary           Patient Acceptance, E, VU,NR by  at 10/14/2022 0954    Acceptance, E, VU,NR by  at 10/13/2022 2147                   Point: Body mechanics (Done)     Learning Progress Summary           Patient Acceptance, E, VU,NR by  at 10/14/2022 0954    Acceptance, E, VU,NR by  at 10/13/2022 2147                   Point: Precautions (Done)     Learning Progress Summary           Patient Acceptance, E, VU,NR by  at 10/14/2022 0954    Acceptance, E, VU,NR by  at 10/13/2022 2147                               User Key     Initials Effective Dates Name Provider Type Discipline     06/16/21 -  Guillermina Nava, PT Physical Therapist PT              PT Recommendation and Plan  Planned Therapy Interventions (PT): balance training, bed mobility training, gait training, home exercise program,  patient/family education, transfer training, strengthening  Plan of Care Reviewed With: patient  Outcome Evaluation: Pt is off O2 today. She tolerated increased ambulation distance with no rest break required. Continues to fatigue quickly     Time Calculation:    PT Charges     Row Name 10/14/22 0954             Time Calculation    Start Time 0920  -KH      Stop Time 0935  -KH      Time Calculation (min) 15 min  -KH      PT Received On 10/14/22  -KH      PT - Next Appointment 10/15/22  -KH         Time Calculation- PT    Total Timed Code Minutes- PT 15 minute(s)  -KH         Timed Charges    02488 - PT Therapeutic Exercise Minutes 15  -KH         Total Minutes    Timed Charges Total Minutes 15  -KH       Total Minutes 15  -KH            User Key  (r) = Recorded By, (t) = Taken By, (c) = Cosigned By    Initials Name Provider Type    Guillermina Perera, PT Physical Therapist              Therapy Charges for Today     Code Description Service Date Service Provider Modifiers Qty    48074204692 HC PT THER PROC EA 15 MIN 10/13/2022 Guillermina Nava, PT GP 1    67793845399 HC PT EVAL MOD COMPLEXITY 2 10/13/2022 Guillermina Nava, PT GP 1    91775020875 HC PT THER PROC EA 15 MIN 10/14/2022 Guillermina Nava, PT GP 1          PT G-Codes  Outcome Measure Options: AM-PAC 6 Clicks Basic Mobility (PT)  AM-PAC 6 Clicks Score (PT): 19    Guillermina Nava, PT  10/14/2022

## 2022-10-15 LAB
ALBUMIN SERPL-MCNC: 3.1 G/DL (ref 3.5–5.2)
ANION GAP SERPL CALCULATED.3IONS-SCNC: 10.6 MMOL/L (ref 5–15)
BASOPHILS # BLD AUTO: 0.04 10*3/MM3 (ref 0–0.2)
BASOPHILS NFR BLD AUTO: 0.5 % (ref 0–1.5)
BUN SERPL-MCNC: 35 MG/DL (ref 6–20)
BUN/CREAT SERPL: 12.3 (ref 7–25)
CALCIUM SPEC-SCNC: 7.7 MG/DL (ref 8.6–10.5)
CHLORIDE SERPL-SCNC: 91 MMOL/L (ref 98–107)
CO2 SERPL-SCNC: 23.4 MMOL/L (ref 22–29)
CREAT SERPL-MCNC: 2.85 MG/DL (ref 0.57–1)
DEPRECATED RDW RBC AUTO: 43.8 FL (ref 37–54)
EGFRCR SERPLBLD CKD-EPI 2021: 18.9 ML/MIN/1.73
EOSINOPHIL # BLD AUTO: 0.03 10*3/MM3 (ref 0–0.4)
EOSINOPHIL NFR BLD AUTO: 0.4 % (ref 0.3–6.2)
ERYTHROCYTE [DISTWIDTH] IN BLOOD BY AUTOMATED COUNT: 14.3 % (ref 12.3–15.4)
GLUCOSE BLDC GLUCOMTR-MCNC: 204 MG/DL (ref 70–130)
GLUCOSE BLDC GLUCOMTR-MCNC: 322 MG/DL (ref 70–130)
GLUCOSE BLDC GLUCOMTR-MCNC: 395 MG/DL (ref 70–130)
GLUCOSE BLDC GLUCOMTR-MCNC: 93 MG/DL (ref 70–130)
GLUCOSE SERPL-MCNC: 395 MG/DL (ref 65–99)
HCT VFR BLD AUTO: 36 % (ref 34–46.6)
HGB BLD-MCNC: 11.5 G/DL (ref 12–15.9)
IMM GRANULOCYTES # BLD AUTO: 0.05 10*3/MM3 (ref 0–0.05)
IMM GRANULOCYTES NFR BLD AUTO: 0.7 % (ref 0–0.5)
LYMPHOCYTES # BLD AUTO: 1.37 10*3/MM3 (ref 0.7–3.1)
LYMPHOCYTES NFR BLD AUTO: 18.1 % (ref 19.6–45.3)
MCH RBC QN AUTO: 27.2 PG (ref 26.6–33)
MCHC RBC AUTO-ENTMCNC: 31.9 G/DL (ref 31.5–35.7)
MCV RBC AUTO: 85.1 FL (ref 79–97)
MONOCYTES # BLD AUTO: 0.52 10*3/MM3 (ref 0.1–0.9)
MONOCYTES NFR BLD AUTO: 6.9 % (ref 5–12)
NEUTROPHILS NFR BLD AUTO: 5.57 10*3/MM3 (ref 1.7–7)
NEUTROPHILS NFR BLD AUTO: 73.4 % (ref 42.7–76)
NRBC BLD AUTO-RTO: 0 /100 WBC (ref 0–0.2)
PHOSPHATE SERPL-MCNC: 2.5 MG/DL (ref 2.5–4.5)
PLATELET # BLD AUTO: 162 10*3/MM3 (ref 140–450)
PMV BLD AUTO: 10.4 FL (ref 6–12)
POTASSIUM SERPL-SCNC: 5 MMOL/L (ref 3.5–5.2)
RBC # BLD AUTO: 4.23 10*6/MM3 (ref 3.77–5.28)
SODIUM SERPL-SCNC: 125 MMOL/L (ref 136–145)
WBC NRBC COR # BLD: 7.58 10*3/MM3 (ref 3.4–10.8)

## 2022-10-15 PROCEDURE — 63710000001 ROPINIROLE 1 MG TABLET: Performed by: NURSE PRACTITIONER

## 2022-10-15 PROCEDURE — A9270 NON-COVERED ITEM OR SERVICE: HCPCS | Performed by: STUDENT IN AN ORGANIZED HEALTH CARE EDUCATION/TRAINING PROGRAM

## 2022-10-15 PROCEDURE — 85025 COMPLETE CBC W/AUTO DIFF WBC: CPT | Performed by: STUDENT IN AN ORGANIZED HEALTH CARE EDUCATION/TRAINING PROGRAM

## 2022-10-15 PROCEDURE — 80069 RENAL FUNCTION PANEL: CPT | Performed by: INTERNAL MEDICINE

## 2022-10-15 PROCEDURE — 63710000001 CARVEDILOL 25 MG TABLET: Performed by: STUDENT IN AN ORGANIZED HEALTH CARE EDUCATION/TRAINING PROGRAM

## 2022-10-15 PROCEDURE — 63710000001 SERTRALINE 50 MG TABLET: Performed by: STUDENT IN AN ORGANIZED HEALTH CARE EDUCATION/TRAINING PROGRAM

## 2022-10-15 PROCEDURE — 82962 GLUCOSE BLOOD TEST: CPT

## 2022-10-15 PROCEDURE — 63710000001 SERTRALINE 100 MG TABLET: Performed by: STUDENT IN AN ORGANIZED HEALTH CARE EDUCATION/TRAINING PROGRAM

## 2022-10-15 PROCEDURE — 63710000001 LEVOTHYROXINE 125 MCG TABLET: Performed by: STUDENT IN AN ORGANIZED HEALTH CARE EDUCATION/TRAINING PROGRAM

## 2022-10-15 PROCEDURE — 63710000001 FOLIC ACID 1 MG TABLET: Performed by: STUDENT IN AN ORGANIZED HEALTH CARE EDUCATION/TRAINING PROGRAM

## 2022-10-15 PROCEDURE — 99222 1ST HOSP IP/OBS MODERATE 55: CPT | Performed by: INTERNAL MEDICINE

## 2022-10-15 PROCEDURE — 63710000001 DILTIAZEM CD 180 MG CAPSULE SUSTAINED-RELEASE 24 HR: Performed by: STUDENT IN AN ORGANIZED HEALTH CARE EDUCATION/TRAINING PROGRAM

## 2022-10-15 PROCEDURE — 63710000001 INSULIN LISPRO (HUMAN) PER 5 UNITS: Performed by: STUDENT IN AN ORGANIZED HEALTH CARE EDUCATION/TRAINING PROGRAM

## 2022-10-15 PROCEDURE — 63710000001 PANTOPRAZOLE 40 MG TABLET DELAYED-RELEASE: Performed by: STUDENT IN AN ORGANIZED HEALTH CARE EDUCATION/TRAINING PROGRAM

## 2022-10-15 PROCEDURE — 63710000001 APIXABAN 5 MG TABLET: Performed by: STUDENT IN AN ORGANIZED HEALTH CARE EDUCATION/TRAINING PROGRAM

## 2022-10-15 PROCEDURE — G0378 HOSPITAL OBSERVATION PER HR: HCPCS

## 2022-10-15 PROCEDURE — A9270 NON-COVERED ITEM OR SERVICE: HCPCS | Performed by: NURSE PRACTITIONER

## 2022-10-15 PROCEDURE — 63710000001 ASPIRIN 81 MG TABLET DELAYED-RELEASE: Performed by: STUDENT IN AN ORGANIZED HEALTH CARE EDUCATION/TRAINING PROGRAM

## 2022-10-15 RX ADMIN — ROPINIROLE 1 MG: 1 TABLET, FILM COATED ORAL at 22:03

## 2022-10-15 RX ADMIN — INSULIN LISPRO 5 UNITS: 100 INJECTION, SOLUTION INTRAVENOUS; SUBCUTANEOUS at 12:13

## 2022-10-15 RX ADMIN — PANTOPRAZOLE SODIUM 40 MG: 40 TABLET, DELAYED RELEASE ORAL at 08:12

## 2022-10-15 RX ADMIN — CARVEDILOL 25 MG: 25 TABLET, FILM COATED ORAL at 08:13

## 2022-10-15 RX ADMIN — INSULIN LISPRO 3 UNITS: 100 INJECTION, SOLUTION INTRAVENOUS; SUBCUTANEOUS at 17:05

## 2022-10-15 RX ADMIN — ASPIRIN 81 MG: 81 TABLET, FILM COATED ORAL at 08:12

## 2022-10-15 RX ADMIN — FOLIC ACID 1 MG: 1 TABLET ORAL at 08:12

## 2022-10-15 RX ADMIN — APIXABAN 5 MG: 5 TABLET, FILM COATED ORAL at 22:03

## 2022-10-15 RX ADMIN — DILTIAZEM HYDROCHLORIDE 180 MG: 180 CAPSULE, EXTENDED RELEASE ORAL at 08:12

## 2022-10-15 RX ADMIN — SERTRALINE 150 MG: 100 TABLET, FILM COATED ORAL at 08:13

## 2022-10-15 RX ADMIN — APIXABAN 5 MG: 5 TABLET, FILM COATED ORAL at 15:59

## 2022-10-15 RX ADMIN — INSULIN GLARGINE-YFGN 18 UNITS: 100 INJECTION, SOLUTION SUBCUTANEOUS at 22:14

## 2022-10-15 RX ADMIN — APIXABAN 5 MG: 5 TABLET, FILM COATED ORAL at 08:13

## 2022-10-15 RX ADMIN — CARVEDILOL 25 MG: 25 TABLET, FILM COATED ORAL at 17:05

## 2022-10-15 RX ADMIN — INSULIN LISPRO 6 UNITS: 100 INJECTION, SOLUTION INTRAVENOUS; SUBCUTANEOUS at 08:13

## 2022-10-15 RX ADMIN — LEVOTHYROXINE SODIUM 250 MCG: 0.12 TABLET ORAL at 08:12

## 2022-10-15 NOTE — PROGRESS NOTES
[facility] Progress Note      Patient Name: Zaina Martinez  : 1965  MRN: [id number]  Primary Care Physician:  Jane Kyle NP  Date of admission: 10/12/2022    Subjective     Interval History:     Patient sitting in bed  Eating breakfast  Patient noticing mild palpitations without any chest pain or shortness of breath  Heart monitor pulse in the 120s with A. Fib    Tolerating oral intake without abdominal pain      Review of Systems:   As noted above    Objective     Vitals:   Temp:  [97.9 °F (36.6 °C)-98.3 °F (36.8 °C)] 98.1 °F (36.7 °C)  Heart Rate:  [121-148] 126  Resp:  [16-20] 16  BP: (126-147)/() 147/113  Flow (L/min):  [1] 1    Intake/Output Summary (Last 24 hours) at 10/15/2022 0922  Last data filed at 10/15/2022 0813  Gross per 24 hour   Intake 1546 ml   Output 2250 ml   Net -704 ml       Physical Exam:    Constitutional: Awake, alert, NAD, chronically ill  Qb 400, 145/92, , fluid removal goal 3 L  HEENT: Sclera anicteric, no conjunctival injection  Neck: Supple, no carotid bruit, trachea at midline, no JVD  Resp: No BS in lower bases, few crackles midfields, nonlabored   Vascular: Right chest TDC  Cardiovascular: Irregularly irregular, tachycardic, 2/6M, no rub  Gastrointestinal: BS +, soft, nontender, +distended  : No palpable bladder  Musculoskeletal:  No significant edema, + clubbing  Psychiatric: Flat affect, depressed mood, cooperative, oriented  Neurologic: moving all extremities, normal speech and mental status  Skin: Warm and dry    Scheduled Meds:     apixaban, 5 mg, Oral, Q12H  aspirin, 81 mg, Oral, Daily  carvedilol, 25 mg, Oral, BID With Meals  dilTIAZem CD, 180 mg, Oral, Q24H  folic acid, 1 mg, Oral, Daily  insulin glargine, 15 Units, Subcutaneous, Nightly  insulin lispro, 0-7 Units, Subcutaneous, TID AC  levothyroxine, 250 mcg, Oral, Q AM  pantoprazole, 40 mg, Oral, Daily  rOPINIRole, 1 mg, Oral, Nightly  sertraline, 150 mg, Oral,  Daily      IV Meds:        Results Reviewed:   I have personally reviewed the results from the time of this admission to 10/15/2022 09:22 EDT     Results from last 7 days   Lab Units 10/15/22  0600 10/14/22  0612 10/13/22  0827 10/12/22  2351 10/12/22  1811   SODIUM mmol/L 125* 128* 130*   < > 129*   POTASSIUM mmol/L 5.0 5.0 4.2   < > 6.7*   CHLORIDE mmol/L 91* 90* 94*   < > 90*   CO2 mmol/L 23.4 25.9 26.0   < > 19.9*   BUN mg/dL 35* 37* 19   < > 44*   CREATININE mg/dL 2.85* 3.42* 2.44*   < > 4.93*   CALCIUM mg/dL 7.7* 7.8* 8.1*   < > 8.5*   BILIRUBIN mg/dL  --   --   --   --  0.4   ALK PHOS U/L  --   --   --   --  300*   ALT (SGPT) U/L  --   --   --   --  26   AST (SGOT) U/L  --   --   --   --  23   GLUCOSE mg/dL 395* 245* 290*   < > 441*    < > = values in this interval not displayed.       Estimated Creatinine Clearance: 19.7 mL/min (A) (by C-G formula based on SCr of 2.85 mg/dL (H)).    Results from last 7 days   Lab Units 10/15/22  0600 10/12/22  2351   MAGNESIUM mg/dL  --  2.0   PHOSPHORUS mg/dL 2.5  --              Results from last 7 days   Lab Units 10/15/22  0600 10/13/22  0827 10/12/22  1811   WBC 10*3/mm3 7.58 8.15 9.16   HEMOGLOBIN g/dL 11.5* 12.2 13.2   PLATELETS 10*3/mm3 162 219 234             Assessment / Plan     ASSESSMENT:  1.  ESRD: Status post right internal jugular TDC.  Undergoing hemodialysis on a Monday, Wednesday and Friday schedule.  Optimizing volume status  2.  Anasarca with bilateral pleural effusions, ascites, and anasarca; has chronic small pericardial effusion, with August '22 echo negative for tamponade, optimizing volume status with hemodialysis  3.  Atrial fibrillation/flutter with rapid rate.  Closely monitor on telemetry, as per cardiology management  4.  Hypertension of ESRD, exacerbated by volume overload  5.  History of noncompliance with fluid restriction, potassium restriction, and medications in general  6.  Diabetes Mellitus type 2 .Continue insulin regimen /  hypoglycemic regimen .Hypoglycemic protocol . POC glucose 4 x day .Diabetic diet  7.  Anemia of ESRD: hemoglobin above goal  8.  Recent left-sided hydroureteronephrosis with an abrupt transition point in left ureter:  followed by urology  9.  Hypothyroidism, severe:  noncompliant with thyroid medication    PLAN:  -Continue hemodialysis during her admission, volume status stable  -Adjust medications to GFR  -Continue to follow closely      Thank you for involving us in the care of Zaina Martinez.  Please feel free to call with any questions.    Jose Robbins MD  10/15/22  09:22 EDT    [facility]  [phone]

## 2022-10-15 NOTE — CONSULTS
Urology Consult  Patient Identification:  Name: Zaina Martinez  Age: 56 y.o.  Sex: female  : 1965  MRN: [id number]   Chief Complaint: gross hematuria  History of Present Illness:   1. Left hydronephrosis s/p stent placement 22 (Dr. Phan) last admission    2. Gross hematuria - on purwik    3. medical hx: ESRD - HD, a flutter, DM 2, hypothyroidism    CT 10/12/2022 - left ureteral stent in position, no hydro    10/15/2022 - gross hematuria, no dysuria, no pain, wbc 7.5, cr 2.8, hgb 11.5, urine culture negative, afvss    Problem List:  @[facility]@  Past Medical History:  Past Medical History:   Diagnosis Date   • Acute CVA (cerebrovascular accident) ([facility]) 2022   • Acute on chronic diastolic CHF (congestive heart failure) ([facility])    • CAD (coronary artery disease) 2021   • Diabetes ([facility])    • Disease of thyroid gland    • GERD (gastroesophageal reflux disease)    • Hyperlipidemia 2018   • Hypertension    • Rheumatoid arthritis ([facility])      Past Surgical History:  Past Surgical History:   Procedure Laterality Date   • CHOLECYSTECTOMY WITH INTRAOPERATIVE CHOLANGIOGRAM N/A 1/10/2022    Procedure: Laparoscopic cholecystectomy with intraoperative cholangiogram;  Surgeon: Ramana Raygoza MD;  Location: [facility];  Service: General;  Laterality: N/A;   • CYSTOSCOPY W/ URETERAL STENT PLACEMENT Left 2022    Procedure: CYSTOSCOPY URETERAL STENT INSERTION WITH RETROGRADE PYELOGRAM;  Surgeon: Bryant Phan Jr., MD;  Location: [facility] OR;  Service: Urology;  Laterality: Left;   • EYE SURGERY     • HYSTERECTOMY     • INSERTION HEMODIALYSIS CATHETER N/A 2021    Procedure: HEMODIALYSIS CATHETER INSERTION;  Surgeon: Keli Salazar MD;  Location: [facility] ;  Service: Vascular;  Laterality: N/A;   • INSERTION HEMODIALYSIS CATHETER N/A 5/3/2022    Procedure: TUNNELED CATHETER PLACEMENT;  Surgeon: Keli Salazar MD;  Location: [facility] OR;  Service: Vascular;   Laterality: N/A;   • MUSCLE BIOPSY Left 6/15/2022    Procedure: Left quadriceps muscle biopsy;  Surgeon: Marques Ma MD;  Location: [facility];  Service: Neurosurgery;  Laterality: Left;      Home Meds:  Medications Prior to Admission   Medication Sig Dispense Refill Last Dose   • apixaban (ELIQUIS) 5 MG tablet tablet Take 1 tablet by mouth Every 12 (Twelve) Hours. Indications: Atrial Fibrillation 60 tablet 0 10/11/2022   • aspirin 81 MG EC tablet Take 1 tablet by mouth Daily. 90 tablet 1 10/11/2022   • carvedilol (COREG) 25 MG tablet Take 1 tablet by mouth 2 (Two) Times a Day With Meals. 60 tablet 2 10/11/2022   • dilTIAZem CD (CARDIZEM CD) 180 MG 24 hr capsule Take 1 capsule by mouth Daily. 30 capsule 2 10/11/2022   • folic acid (FOLVITE) 1 MG tablet Take 1 mg by mouth Daily.   10/11/2022   • Hydrocortisone, Perianal, (ANUSOL-HC) 2.5 % rectal cream Insert  into the rectum 2 (Two) Times a Day. 28 g 0 10/11/2022   • insulin glargine (LANTUS, SEMGLEE) 100 UNIT/ML injection Inject 3 Units under the skin into the appropriate area as directed Every Night.  12 10/11/2022   • insulin lispro (ADMELOG) 100 UNIT/ML injection Inject 0-14 Units under the skin into the appropriate area as directed 3 (Three) Times a Day Before Meals. (Patient taking differently: Inject 0-14 Units under the skin into the appropriate area as directed 3 (Three) Times a Day Before Meals. Per sliding Scale)  12 10/11/2022   • levothyroxine (SYNTHROID, LEVOTHROID) 125 MCG tablet Take 2 tablets by mouth Every Morning. 60 tablet 2 10/11/2022   • melatonin 3 MG tablet Take 1 tablet by mouth Every Night. 30 tablet 0 10/11/2022   • miconazole (MICOTIN) 2 % cream Apply 1 application topically to the appropriate area as directed Every 12 (Twelve) Hours. (Patient taking differently: Apply 1 application topically to the appropriate area as directed Every 12 (Twelve) Hours. Lower Back Rash)   10/11/2022   • pantoprazole (PROTONIX) 40 MG EC tablet Take  1 tablet by mouth Daily. 90 tablet 0 10/11/2022   • polyethylene glycol (MIRALAX) 17 g packet Take 17 g by mouth Daily As Needed (constipation).   Past Month   • predniSONE (DELTASONE) 5 MG tablet Two tabs po with breakfast -, 1.5 tabs with breakfast , then 1 tab daily starting 2022 and continue on that dose (Patient taking differently: Take 1 tablet by mouth See Admin Instructions. Two tabs po with breakfast -, 1.5 tabs with breakfast , then 1 tab daily starting 2022 and continue on that dose) 35 tablet 1 10/11/2022   • rOPINIRole (REQUIP) 1 MG tablet Take 1 tablet by mouth Every Night for 30 days. Take 1 hour before bedtime. 30 tablet 0 10/11/2022   • sertraline (ZOLOFT) 50 MG tablet Take 3 tablets by mouth Daily. 90 tablet 1 10/11/2022     Current Meds:   [unfilled]  Allergies:  Allergies   Allergen Reactions   • Contrast Dye Confusion   • Ibuprofen Other (See Comments)     Kidney disease   • Prochlorperazine Other (See Comments)     Dystonic reaction          Immunizations:  Immunization History   Administered Date(s) Administered   • Anthrax 2018   • Flu Vaccine Split Quad 11/15/2019   • Fluzone Quad >6mos (Multi-dose) 2018   • Hepatitis B Vaccine Adult IM 2022, 2022, 2022   • Influenza, Unspecified 2018   • PPD Test 2015     Social History:   Social History     Tobacco Use   • Smoking status: Never   • Smokeless tobacco: Never   Substance Use Topics   • Alcohol use: Never      Family History:  Family History   Problem Relation Age of Onset   • Malig Hyperthermia Neg Hx       Review of Systems  negative 12 point system review except: gross hematuria  Objective:  tMax 24 hrs: Temp (24hrs), Av.1 °F (36.7 °C), Min:97.9 °F (36.6 °C), Max:98.3 °F (36.8 °C)    Vitals Ranges:   Temp:  [97.9 °F (36.6 °C)-98.3 °F (36.8 °C)] 98.1 °F (36.7 °C)  Heart Rate:  [121-148] 126  Resp:  [16-20]  "16  BP: (126-147)/() 147/113  Intake and Output Last 3 Shifts:   I/O last 3 completed shifts:  In: 1556 [P.O.:1556]  Out: 2250 [Urine:150; Other:2100]  Exam:  BP (!) 147/113 (BP Location: Left arm, Patient Position: Lying)   Pulse (!) 126   Temp 98.1 °F (36.7 °C) (Oral)   Resp 16   Ht 157.5 cm (62\")   Wt 56.6 kg (124 lb 12.5 oz)   SpO2 91%   BMI 22.82 kg/m²       General Appearance: Alert, cooperative, no distress, appears stated age   Head: Normocephalic, without obvious abnormality, atraumatic   ENT: Normal hearing, external inspection, ears and nose   Eyes: Normal pupils/irises, external inspection, conjunctivae, eyelids   Back: No CVA tenderness   Respiratory: Normal effort, palpation, auscultation   CV: Normal auscultation, carotid pulses, no edema   Abdomen: No hernia, soft, nontender, no masses   :    Musculoskeletal: Normal extremities, nails, digits   Skin: Normal skin color, texture, no rashes or lesion   Neurologic/psych: Normal orientation, mood, affect           Data Review:  CBC:   Results from last 7 days   Lab Units 10/15/22  0600   WBC 10*3/mm3 7.58   RBC 10*6/mm3 4.23     BMP:   Results from last 7 days   Lab Units 10/15/22  0600   GLUCOSE mg/dL 395*   CO2 mmol/L 23.4   BUN mg/dL 35*   CREATININE mg/dL 2.85*   CALCIUM mg/dL 7.7*      Assessment:    Hyperkalemia    Type 2 diabetes mellitus with kidney complication, with long-term current use of insulin (HCC)    Generalized weakness    ESRD (end stage renal disease) ([facility])    Status post cholecystectomy    Hyponatremia    Gross hematuria  Left hydronephrosis s/p Left stent 9/29/22 - in place, hgb stable      Plan:  No gu intervention at this time  Reassured, gross hematuria expected with Stent  F/u with Dr. Phan outpt when discharged, my office will call pt with appt information    RIKA Roldan  10/15/2022      "

## 2022-10-15 NOTE — CONSULTS
Patient Name: Zania Martinez  :1965  56 y.o.    Date of Admission: 10/12/2022  Date of Consultation:  10/15/22  Encounter Provider: Marisol Lopez RN  Place of Service: [facility] CARDIOLOGY  Referring Provider: Jerson Bull MD  Patient Care Team:  Jane Kyle NP as PCP - General (Family Medicine)  Adonis Fraga Jr., MD as Consulting Physician (Hematology and Oncology)      Chief complaint: generalized weakness, nausea    Reason for consult: atrial flutter, RVR    History of Present Illness:  Zaina Martinez is a 56 year old female with a past medical history of hypertension, hyperlipidemia, ESRD on HD, hypothyroidism, rheumatoid arthritis on chronic prednisone who presents to the hospital for further evaluation for nausea and generalized weakness. She has been hospitalized several times over the past year for varying issues from volume overload to encephalopathy.    She has an extensive history and this is her tenth hospital admission this year. She was previously hospitalized for stroke with hemorrhagic conversion. She had admissions for MSSA and MRSA bacteremia, for which she underwent a ANJEL which revealed a thrombus on her indwelling dialysis catheter.     She was recently discharged on 10/6/22 with similar complaints of weakness. She was seen by Dr. Campos for management of atrial flutter during this admission, and was discharged on Carvedilol 25 mg BID, Diltiazem 180 mg daily, Eliquis (home dose). Due to her various admissions, she has yet to be able to officially establish with a cardiologist in the outpatient setting.    She presented to the ED with complaints of nausea and weakness since her discharge on 10/6. She was scheduled for dialysis on the day of presentation (10/12) but did not go, and last dialysis prior to this was on Monday 10/10, which lasted only one hour.   In the ER, patient was hyperkalemic (K 6.7), creatinine 4.93, troponin 0.194, lipase 213 and CXR showed  volume overload. EKG showed atrial flutter, rate 115. She was admitted for emergent dialysis.     Last night, patient's atrial flutter became more rapid with EKG reading atrial flutter, rate 147. We have been asked to see patient for assistance in managing her atrial flutter.     56 years old with shortness of breath    Cardiac testing:  ANJEL 8/26/22  • There is a dialysis catheter which extends into the right atrial cavity. There is a large and highly mobile echodensity attached to the distal portion of the dialysis catheter. The echodensity measures 1.4 cm x 0.86 cm, and is most consistent with a thrombus  • Estimated left ventricular EF = 55% Left ventricular systolic function is normal.  • Left ventricular wall thickness is consistent with mild concentric hypertrophy.  • Normal right ventricular cavity size and systolic function noted.  • Doppler interrogation shows severely reduced flow within the left atrial appendage. No evidence of a left atrial appendage thrombus was present.  • There is a small PFO noted on the agitated saline study  • Moderate mitral valve regurgitation is present  • Moderate tricuspid valve regurgitation is present.  • Calculated right ventricular systolic pressure from tricuspid regurgitation is 42 mmHg.  • There is a small focal area of severe plaque in the descending thoracic aorta. No significant plaques were seen in the aortic arch  • There is a small circumferential pericardial effusion without evidence of tamponade. There is a small left pleural effusion    Past Medical History:   Diagnosis Date   • Acute CVA (cerebrovascular accident) ([facility]) 5/1/2022   • Acute on chronic diastolic CHF (congestive heart failure) ([facility])    • CAD (coronary artery disease) 12/20/2021   • Diabetes ([facility])    • Disease of thyroid gland    • GERD (gastroesophageal reflux disease)    • Hyperlipidemia 11/30/2018   • Hypertension    • Rheumatoid arthritis ([facility])        Past Surgical History:   Procedure  Laterality Date   • CHOLECYSTECTOMY WITH INTRAOPERATIVE CHOLANGIOGRAM N/A 1/10/2022    Procedure: Laparoscopic cholecystectomy with intraoperative cholangiogram;  Surgeon: aRmana Raygoza MD;  Location: [facility] OR;  Service: General;  Laterality: N/A;   • CYSTOSCOPY W/ URETERAL STENT PLACEMENT Left 9/29/2022    Procedure: CYSTOSCOPY URETERAL STENT INSERTION WITH RETROGRADE PYELOGRAM;  Surgeon: Bryant Phan Jr., MD;  Location: [facility] OR;  Service: Urology;  Laterality: Left;   • EYE SURGERY     • HYSTERECTOMY     • INSERTION HEMODIALYSIS CATHETER N/A 12/6/2021    Procedure: HEMODIALYSIS CATHETER INSERTION;  Surgeon: Keli Salazar MD;  Location: [facility] OR 18/19;  Service: Vascular;  Laterality: N/A;   • INSERTION HEMODIALYSIS CATHETER N/A 5/3/2022    Procedure: TUNNELED CATHETER PLACEMENT;  Surgeon: Keli Salazar MD;  Location: [facility] MAIN OR;  Service: Vascular;  Laterality: N/A;   • MUSCLE BIOPSY Left 6/15/2022    Procedure: Left quadriceps muscle biopsy;  Surgeon: Marques Ma MD;  Location: [facility] OR;  Service: Neurosurgery;  Laterality: Left;         Prior to Admission medications    Medication Sig Start Date End Date Taking? Authorizing Provider   apixaban (ELIQUIS) 5 MG tablet tablet Take 1 tablet by mouth Every 12 (Twelve) Hours. Indications: Atrial Fibrillation 8/27/22  Yes Lul Reid MD   aspirin 81 MG EC tablet Take 1 tablet by mouth Daily. 7/22/22  Yes Adonis Florentino MD   carvedilol (COREG) 25 MG tablet Take 1 tablet by mouth 2 (Two) Times a Day With Meals. 10/6/22  Yes Jimmy Armenta DO   dilTIAZem CD (CARDIZEM CD) 180 MG 24 hr capsule Take 1 capsule by mouth Daily. 10/7/22  Yes Jimmy Armenta DO   folic acid (FOLVITE) 1 MG tablet Take 1 mg by mouth Daily.   Yes Doyle Murdock MD   Hydrocortisone, Perianal, (ANUSOL-HC) 2.5 % rectal cream Insert  into the rectum 2 (Two) Times a Day. 10/6/22  Yes Jimmy Armenta DO   insulin glargine (LANTUS,  SEMGLEE) 100 UNIT/ML injection Inject 3 Units under the skin into the appropriate area as directed Every Night. 9/13/22  Yes Ronnie Baptiste MD   insulin lispro (ADMELOG) 100 UNIT/ML injection Inject 0-14 Units under the skin into the appropriate area as directed 3 (Three) Times a Day Before Meals.  Patient taking differently: Inject 0-14 Units under the skin into the appropriate area as directed 3 (Three) Times a Day Before Meals. Per sliding Scale 7/22/22  Yes Adonis Florentino MD   levothyroxine (SYNTHROID, LEVOTHROID) 125 MCG tablet Take 2 tablets by mouth Every Morning. 10/6/22  Yes Jimmy Armenta DO   melatonin 3 MG tablet Take 1 tablet by mouth Every Night. 10/6/22  Yes Jimmy Armenta DO   miconazole (MICOTIN) 2 % cream Apply 1 application topically to the appropriate area as directed Every 12 (Twelve) Hours.  Patient taking differently: Apply 1 application topically to the appropriate area as directed Every 12 (Twelve) Hours. Lower Back Rash 7/22/22  Yes Adonis Florentino MD   pantoprazole (PROTONIX) 40 MG EC tablet Take 1 tablet by mouth Daily. 7/1/22  Yes Adonis Florentino MD   polyethylene glycol (MIRALAX) 17 g packet Take 17 g by mouth Daily As Needed (constipation). 7/22/22  Yes Adonis Florentino MD   predniSONE (DELTASONE) 5 MG tablet Two tabs po with breakfast July 26-July 28, 1.5 tabs with breakfast July 29-August 4, then 1 tab daily starting Friday August 5, 2022 and continue on that dose  Patient taking differently: Take 1 tablet by mouth See Admin Instructions. Two tabs po with breakfast July 26-July 28, 1.5 tabs with breakfast July 29-August 4, then 1 tab daily starting Friday August 5, 2022 and continue on that dose 7/25/22  Yes Adonis Florentino MD   rOPINIRole (REQUIP) 1 MG tablet Take 1 tablet by mouth Every Night for 30 days. Take 1 hour before bedtime. 9/13/22 10/13/22 Yes Ronnie Baptiste MD   sertraline (ZOLOFT) 50 MG tablet Take 3 tablets by mouth Daily. 7/2/22  Yes  "Adonis Florentino MD       Allergies   Allergen Reactions   • Contrast Dye Confusion   • Ibuprofen Other (See Comments)     Kidney disease   • Prochlorperazine Other (See Comments)     Dystonic reaction            Social History     Socioeconomic History   • Marital status:      Spouse name: Marv   Tobacco Use   • Smoking status: Never   • Smokeless tobacco: Never   Vaping Use   • Vaping Use: Never used   Substance and Sexual Activity   • Alcohol use: Never   • Drug use: Never   • Sexual activity: Defer       Family History   Problem Relation Age of Onset   • Malig Hyperthermia Neg Hx        REVIEW OF SYSTEMS:   All systems reviewed.  Pertinent positives identified in HPI.  All other systems are negative.      Objective:     Vitals:    10/14/22 1800 10/14/22 1949 10/14/22 2322 10/15/22 0700   BP: 142/95 143/99 126/95 (!) 147/113   BP Location:  Left arm Left arm Left arm   Patient Position:  Lying Lying Lying   Pulse: (!) 134 (!) 134 (!) 123 (!) 126   Resp:  20 20 16   Temp:  98 °F (36.7 °C) 98.3 °F (36.8 °C) 98.1 °F (36.7 °C)   TempSrc:  Oral Oral Oral   SpO2:    91%   Weight:       Height:         Body mass index is 22.82 kg/m².             Physical Exam  /99 (BP Location: Left arm, Patient Position: Sitting)   Pulse 92   Temp 98.1 °F (36.7 °C) (Oral)   Resp 16   Ht 157.5 cm (62\")   Wt 56.6 kg (124 lb 12.5 oz)   SpO2 94%   BMI 22.82 kg/m²     General appearance: No acute changes   Eyes: Sclerae conjunctivae normal, pupils reactive   HENT: Atraumatic; oropharynx clear with moist mucous membranes and no mucosal ulcerations;  Neck: Trachea midline; NECK, supple, no thyromegaly or lymphadenopathy   Lungs: Normal size and shape, normal breath sounds, equal distribution of air, no rales and rhonchi   CV: S1-S2 regular, no murmurs, no rub, no gallop   Abdomen: Soft, nontender; no masses , no abnormal abdominal sounds   Extremities: No deformity , normal color , no peripheral edema   Skin: Normal " temperature, turgor and texture; no rash, ulcers  Psych: Appropriate affect, alert and oriented to person, place and time     ab Review:     Results from last 7 days   Lab Units 10/15/22  0600 10/12/22  2351 10/12/22  1811   SODIUM mmol/L 125*   < > 129*   POTASSIUM mmol/L 5.0   < > 6.7*   CHLORIDE mmol/L 91*   < > 90*   CO2 mmol/L 23.4   < > 19.9*   BUN mg/dL 35*   < > 44*   CREATININE mg/dL 2.85*   < > 4.93*   CALCIUM mg/dL 7.7*   < > 8.5*   BILIRUBIN mg/dL  --   --  0.4   ALK PHOS U/L  --   --  300*   ALT (SGPT) U/L  --   --  26   AST (SGOT) U/L  --   --  23   GLUCOSE mg/dL 395*   < > 441*    < > = values in this interval not displayed.     Results from last 7 days   Lab Units 10/12/22  2351 10/12/22  1811   TROPONIN T ng/mL 0.168* 0.194*     Results from last 7 days   Lab Units 10/15/22  0600   WBC 10*3/mm3 7.58   HEMOGLOBIN g/dL 11.5*   HEMATOCRIT % 36.0   PLATELETS 10*3/mm3 162         Results from last 7 days   Lab Units 10/12/22  2351   MAGNESIUM mg/dL 2.0                  EKG      Baseline EKG      I personally viewed and interpreted the patient's EKG/Telemetry data.        Assessment and Plan:   Atrial flutter controlled rate on Cardizem CD as well as beta-blockers  Needs anticoagulation  Right-sided atrial mass    Elevated troponin due to renal    Considering patient has a atrial flutter patient should be on full anticoagulation if medically permitted    Atrial flutter ablation versus cardioversion should be considered also    Freddie Mazariegos MD  10/15/22  07:52 EDT

## 2022-10-15 NOTE — PROGRESS NOTES
Name: Zaina Martinez ADMIT: 10/12/2022   : 1965  PCP: Jane Kyle NP    MRN: [id number] LOS: 3 days   AGE/SEX: 56 y.o. female  ROOM: [facility]     Subjective   Subjective   Patient sitting up on side of the bed.  Patient without any complaints this morning.  Having some dark red urine output from her pure wick.  Had recent cystoscopy with stent in September.    Review of Systems   Constitutional: Negative for chills and fever.   Respiratory: Negative for cough and shortness of breath.    Cardiovascular: Negative for chest pain and leg swelling.   Gastrointestinal: Negative for abdominal pain, diarrhea and nausea.   Genitourinary: Positive for hematuria. Negative for dysuria.   Skin: Negative for color change.     As above     Objective   Objective   Vital Signs  Temp:  [97.9 °F (36.6 °C)-98.7 °F (37.1 °C)] 98.7 °F (37.1 °C)  Heart Rate:  [123-148] 127  Resp:  [16-20] 16  BP: (126-147)/() 126/91  SpO2:  [91 %-93 %] 93 %  on  Flow (L/min):  [1] 1;   Device (Oxygen Therapy): room air  Body mass index is 22.82 kg/m².  Physical Exam  Constitutional:       General: She is not in acute distress.     Appearance: She is not diaphoretic.      Comments: Right tunneled dialysis catheter.   HENT:      Head: Normocephalic and atraumatic.      Mouth/Throat:      Mouth: Mucous membranes are moist.      Pharynx: Oropharynx is clear.   Eyes:      Extraocular Movements: Extraocular movements intact.      Pupils: Pupils are equal, round, and reactive to light.   Cardiovascular:      Rate and Rhythm: Normal rate and regular rhythm.      Heart sounds: No murmur heard.    No gallop.   Pulmonary:      Effort: Pulmonary effort is normal. No respiratory distress.      Breath sounds: No wheezing.   Abdominal:      General: Abdomen is flat. Bowel sounds are normal. There is no distension.      Palpations: Abdomen is soft.      Tenderness: There is no abdominal tenderness.   Genitourinary:     Comments: Dark red urine  empiric.  Musculoskeletal:         General: No swelling or deformity. Normal range of motion.   Skin:     General: Skin is warm and dry.   Neurological:      General: No focal deficit present.      Mental Status: She is alert and oriented to person, place, and time.         Results Review     I reviewed the patient's new clinical results.  Results from last 7 days   Lab Units 10/15/22  0600 10/13/22  0827 10/12/22  1811   WBC 10*3/mm3 7.58 8.15 9.16   HEMOGLOBIN g/dL 11.5* 12.2 13.2   PLATELETS 10*3/mm3 162 219 234     Results from last 7 days   Lab Units 10/15/22  0600 10/14/22  0612 10/13/22  0827 10/12/22  2351   SODIUM mmol/L 125* 128* 130* 125*   POTASSIUM mmol/L 5.0 5.0 4.2 6.8*   CHLORIDE mmol/L 91* 90* 94* 91*   CO2 mmol/L 23.4 25.9 26.0 18.5*   BUN mg/dL 35* 37* 19 45*   CREATININE mg/dL 2.85* 3.42* 2.44* 4.76*   GLUCOSE mg/dL 395* 245* 290* 344*   Estimated Creatinine Clearance: 19.7 mL/min (A) (by C-G formula based on SCr of 2.85 mg/dL (H)).  Results from last 7 days   Lab Units 10/15/22  0600 10/12/22  1811   ALBUMIN g/dL 3.10* 3.30*   BILIRUBIN mg/dL  --  0.4   ALK PHOS U/L  --  300*   AST (SGOT) U/L  --  23   ALT (SGPT) U/L  --  26     Results from last 7 days   Lab Units 10/15/22  0600 10/14/22  0612 10/13/22  0827 10/12/22  2351 10/12/22  1811   CALCIUM mg/dL 7.7* 7.8* 8.1* 8.5* 8.5*   ALBUMIN g/dL 3.10*  --   --   --  3.30*   MAGNESIUM mg/dL  --   --   --  2.0  --    PHOSPHORUS mg/dL 2.5  --   --   --   --        COVID19   Date Value Ref Range Status   09/03/2022 Not Detected Not Detected - Ref. Range Final   08/25/2022 Not Detected Not Detected - Ref. Range Final     Hemoglobin A1C   Date/Time Value Ref Range Status   10/14/2022 0612 9.70 (H) 4.80 - 5.60 % Final     Glucose   Date/Time Value Ref Range Status   10/15/2022 1136 322 (H) 70 - 130 mg/dL Final     Comment:     Meter: [id number] : [id number] Harvey SAAVEDRA   10/15/2022 0532 395 (H) 70 - 130 mg/dL Final     Comment:     Meter:  [id number] : [id number] Guanaco Villalba NA   10/14/2022 2135 403 (C) 70 - 130 mg/dL Final     Comment:     Result Not Confirmed Meter: [id number] : [id number] Therese Morales RN   10/14/2022 1719 197 (H) 70 - 130 mg/dL Final     Comment:     Meter: [id number] : [id number] Roshan Lawler NA   10/14/2022 1108 138 (H) 70 - 130 mg/dL Final     Comment:     Meter: [id number] : [id number] Roshan Cuevasee NA   10/14/2022 0607 240 (H) 70 - 130 mg/dL Final     Comment:     Meter: [id number] : [id number] VonZulemaorf Alexia NA   10/14/2022 0335 164 (H) 70 - 130 mg/dL Final     Comment:     Meter: [id number] : [id number] SohailHandradha Alexia NA       CT Abdomen Pelvis Without Contrast  Narrative: ABDOMEN AND PELVIS CT WITHOUT CONTRAST     HISTORY: Nausea vomiting and lipase, lengthy list of medical issues.     Radiation dose reduction techniques were utilized, including automated  exposure control and exposure modulation based on body size.     Noncontrast abdomen and pelvis CT is provided. Correlation with  noncontrast CT July 27, 2022.     FINDINGS: Atelectasis or pneumonia in the posterior lung bases  bilaterally with bilateral pleural effusions and diffuse third spacing.  Left ureteral stent is positioned as expected. No hydronephrosis.  Extensive atheromatous vascular calcification with no abnormally dilated  bowel. Prior cholecystectomy. Renal parenchyma appears somewhat  atrophic. Solid and parenchyma otherwise appears normal. Partly  calcified, well-demarcated lesion in the mesentery 31 x 19 mm is smaller  and more mineralized today than on July 10 exam and may represent a  small aging hematoma.     Chronic T12 superior plate deformity with mild loss of height  anteriorly; no paraspinal inflammatory change.     Impression: Diffuse third spacing with pleural effusions and atelectasis  versus pneumonia the lung bases. Left ureteral stent is positioned as  expected. No acute abnormality identified.     This report  was finalized on 10/12/2022 8:11 PM by Dr. Adonis Burch M.D.     XR Chest 1 View  Narrative: PORTABLE CHEST X-RAY     HISTORY: Tachycardia.     TECHNIQUE: Oblique portable chest x-ray correlated chest x-ray September 26.     FINDINGS: Right IJ dialysis catheter is again observed. There is  pulmonary vascular engorgement and pulmonary edema suspected small  pleural effusions bilaterally and pneumonia or atelectasis in the left  retrocardiac lung base. The appearance is similar to that observed on  x-ray last month.     Impression: Volume overload with pulmonary edema and possible left base  atelectasis or pneumonia.     This report was finalized on 10/12/2022 6:40 PM by Dr. Adonis Burch M.D.       I reviewed the patient's daily medications.  Scheduled Medications  apixaban, 5 mg, Oral, Q12H  aspirin, 81 mg, Oral, Daily  carvedilol, 25 mg, Oral, BID With Meals  dilTIAZem CD, 180 mg, Oral, Q24H  folic acid, 1 mg, Oral, Daily  insulin glargine, 15 Units, Subcutaneous, Nightly  insulin lispro, 0-7 Units, Subcutaneous, TID AC  levothyroxine, 250 mcg, Oral, Q AM  pantoprazole, 40 mg, Oral, Daily  rOPINIRole, 1 mg, Oral, Nightly  sertraline, 150 mg, Oral, Daily    Infusions   Diet  Diet Regular; Consistent Carbohydrate, Renal, Low Potassium, Daily Fluid Restriction; Other; 1,200; 2 gm (50 mEq)         I have personally reviewed:  [x]  Laboratory   []  Microbiology   []  Radiology   []  EKG/Telemetry   []  Cardiology/Vascular   []  Pathology   []  Records     Assessment/Plan     Active Hospital Problems    Diagnosis  POA   • **Hyperkalemia [E87.5]  Yes   • Status post cholecystectomy [Z90.49]  Not Applicable   • Hyponatremia [E87.1]  Yes   • ESRD (end stage renal disease) (HCC) [N18.6]  Yes   • Generalized weakness [R53.1]  Yes   • Type 2 diabetes mellitus with kidney complication, with long-term current use of insulin (HCC) [E11.29, Z79.4]  Not Applicable      Resolved Hospital Problems   No resolved problems to  display.       56 y.o. female admitted with Hyperkalemia.    ESRD on hemodialysis with hypokalemia and hyponatremia: Dialysis resumed.  Nephrology input appreciated.  Hypoxic respiratory failure, resolved:   Secondary to fluid overload from missing dialysis.    Atrial flutter:  Resume Coreg and diltiazem.  Resume Eliquis.  Patient has had multiple episodes of heart rates in the 130s, will consult cardiology, appreciate recommendations.  Type 2 diabetes:    Accu-Cheks above goal.  Increase at bedtime Lantus to 18 units.   Hypothyroid: TSH 79.  Reported on 250 mcg of levothyroxine daily.  Suspect noncompliance.    Used to see endocrinologist, but they retired.  Will send ambulatory referral on discharge.  Elevated troponin-patient without any chest pain.  Troponins downtrending   Hematuria in setting of recent cystoscopy and ureteral stent-urology evaluated patient, state this is to be expected risk of recent stent.  Plan for outpatient follow-up.  We will hold apixaban for now.  Hemoglobin down to 11.5.    · SCDs for DVT prophylaxis.  · Full code.  · Discussed with patient and nursing staff.  · Anticipate discharge Home versus BAR timing yet to be determined.  PM&R requesting OT consult to complete work-up for possible BAR admission      Eddie Givens MD  [facility]  10/15/22  13:14 EDT

## 2022-10-15 NOTE — PROGRESS NOTES
[facility] Acute Inpt [facility]hab    Received voice mail from Anel, pt is full referral.  Evaluation in progress.  Noted OT not ordered. Called weekend  and requested OT to be ordered to complete workup. Verbalized understanding.    Ruth Whiting RN  Acute Rehab Admission Nurse

## 2022-10-15 NOTE — PLAN OF CARE
Goal Outcome Evaluation:  Plan of Care Reviewed With: patient        Progress: no change  Outcome Evaluation: Monitor pain,labs,and vitals. VSS. No C/O pain on current shift. Educated patient on fluid restriction and importance of following diet orders. Purewick. Bed alarm. O2 1L NC per patient request. Patient is on A-flutter on the monitor. Will continue to monitor.

## 2022-10-16 LAB
BASOPHILS # BLD AUTO: 0.05 10*3/MM3 (ref 0–0.2)
BASOPHILS NFR BLD AUTO: 0.6 % (ref 0–1.5)
DEPRECATED RDW RBC AUTO: 46.7 FL (ref 37–54)
EOSINOPHIL # BLD AUTO: 0.14 10*3/MM3 (ref 0–0.4)
EOSINOPHIL NFR BLD AUTO: 1.7 % (ref 0.3–6.2)
ERYTHROCYTE [DISTWIDTH] IN BLOOD BY AUTOMATED COUNT: 14.8 % (ref 12.3–15.4)
GLUCOSE BLDC GLUCOMTR-MCNC: 129 MG/DL (ref 70–130)
GLUCOSE BLDC GLUCOMTR-MCNC: 209 MG/DL (ref 70–130)
GLUCOSE BLDC GLUCOMTR-MCNC: 258 MG/DL (ref 70–130)
GLUCOSE BLDC GLUCOMTR-MCNC: 355 MG/DL (ref 70–130)
HCT VFR BLD AUTO: 35.7 % (ref 34–46.6)
HGB BLD-MCNC: 11.3 G/DL (ref 12–15.9)
IMM GRANULOCYTES # BLD AUTO: 0.04 10*3/MM3 (ref 0–0.05)
IMM GRANULOCYTES NFR BLD AUTO: 0.5 % (ref 0–0.5)
LYMPHOCYTES # BLD AUTO: 1.52 10*3/MM3 (ref 0.7–3.1)
LYMPHOCYTES NFR BLD AUTO: 18.1 % (ref 19.6–45.3)
MCH RBC QN AUTO: 27.6 PG (ref 26.6–33)
MCHC RBC AUTO-ENTMCNC: 31.7 G/DL (ref 31.5–35.7)
MCV RBC AUTO: 87.3 FL (ref 79–97)
MONOCYTES # BLD AUTO: 0.56 10*3/MM3 (ref 0.1–0.9)
MONOCYTES NFR BLD AUTO: 6.7 % (ref 5–12)
NEUTROPHILS NFR BLD AUTO: 6.11 10*3/MM3 (ref 1.7–7)
NEUTROPHILS NFR BLD AUTO: 72.4 % (ref 42.7–76)
NRBC BLD AUTO-RTO: 0 /100 WBC (ref 0–0.2)
PLATELET # BLD AUTO: 167 10*3/MM3 (ref 140–450)
PMV BLD AUTO: 11.1 FL (ref 6–12)
RBC # BLD AUTO: 4.09 10*6/MM3 (ref 3.77–5.28)
WBC NRBC COR # BLD: 8.42 10*3/MM3 (ref 3.4–10.8)

## 2022-10-16 PROCEDURE — G0378 HOSPITAL OBSERVATION PER HR: HCPCS

## 2022-10-16 PROCEDURE — A9270 NON-COVERED ITEM OR SERVICE: HCPCS | Performed by: STUDENT IN AN ORGANIZED HEALTH CARE EDUCATION/TRAINING PROGRAM

## 2022-10-16 PROCEDURE — 63710000001 SERTRALINE 100 MG TABLET: Performed by: STUDENT IN AN ORGANIZED HEALTH CARE EDUCATION/TRAINING PROGRAM

## 2022-10-16 PROCEDURE — 63710000001 ASPIRIN 81 MG TABLET DELAYED-RELEASE: Performed by: STUDENT IN AN ORGANIZED HEALTH CARE EDUCATION/TRAINING PROGRAM

## 2022-10-16 PROCEDURE — 63710000001 APIXABAN 5 MG TABLET: Performed by: INTERNAL MEDICINE

## 2022-10-16 PROCEDURE — 63710000001 ROPINIROLE 1 MG TABLET: Performed by: NURSE PRACTITIONER

## 2022-10-16 PROCEDURE — 99232 SBSQ HOSP IP/OBS MODERATE 35: CPT | Performed by: INTERNAL MEDICINE

## 2022-10-16 PROCEDURE — 85025 COMPLETE CBC W/AUTO DIFF WBC: CPT | Performed by: STUDENT IN AN ORGANIZED HEALTH CARE EDUCATION/TRAINING PROGRAM

## 2022-10-16 PROCEDURE — A9270 NON-COVERED ITEM OR SERVICE: HCPCS | Performed by: NURSE PRACTITIONER

## 2022-10-16 PROCEDURE — 97110 THERAPEUTIC EXERCISES: CPT

## 2022-10-16 PROCEDURE — 63710000001 MELATONIN 1 MG TABLET: Performed by: NURSE PRACTITIONER

## 2022-10-16 PROCEDURE — 63710000001 DILTIAZEM CD 180 MG CAPSULE SUSTAINED-RELEASE 24 HR: Performed by: STUDENT IN AN ORGANIZED HEALTH CARE EDUCATION/TRAINING PROGRAM

## 2022-10-16 PROCEDURE — 63710000001 LEVOTHYROXINE 125 MCG TABLET: Performed by: STUDENT IN AN ORGANIZED HEALTH CARE EDUCATION/TRAINING PROGRAM

## 2022-10-16 PROCEDURE — 63710000001 INSULIN LISPRO (HUMAN) PER 5 UNITS: Performed by: STUDENT IN AN ORGANIZED HEALTH CARE EDUCATION/TRAINING PROGRAM

## 2022-10-16 PROCEDURE — 63710000001 FOLIC ACID 1 MG TABLET: Performed by: STUDENT IN AN ORGANIZED HEALTH CARE EDUCATION/TRAINING PROGRAM

## 2022-10-16 PROCEDURE — 63710000001 CARVEDILOL 25 MG TABLET: Performed by: STUDENT IN AN ORGANIZED HEALTH CARE EDUCATION/TRAINING PROGRAM

## 2022-10-16 PROCEDURE — A9270 NON-COVERED ITEM OR SERVICE: HCPCS | Performed by: INTERNAL MEDICINE

## 2022-10-16 PROCEDURE — 63710000001 SERTRALINE 50 MG TABLET: Performed by: STUDENT IN AN ORGANIZED HEALTH CARE EDUCATION/TRAINING PROGRAM

## 2022-10-16 PROCEDURE — 63710000001 PANTOPRAZOLE 40 MG TABLET DELAYED-RELEASE: Performed by: STUDENT IN AN ORGANIZED HEALTH CARE EDUCATION/TRAINING PROGRAM

## 2022-10-16 PROCEDURE — 82962 GLUCOSE BLOOD TEST: CPT

## 2022-10-16 PROCEDURE — 97530 THERAPEUTIC ACTIVITIES: CPT

## 2022-10-16 RX ORDER — UREA 10 %
3 LOTION (ML) TOPICAL NIGHTLY
Status: DISCONTINUED | OUTPATIENT
Start: 2022-10-16 | End: 2022-10-17 | Stop reason: HOSPADM

## 2022-10-16 RX ADMIN — SERTRALINE 150 MG: 100 TABLET, FILM COATED ORAL at 07:30

## 2022-10-16 RX ADMIN — DILTIAZEM HYDROCHLORIDE 180 MG: 180 CAPSULE, EXTENDED RELEASE ORAL at 07:30

## 2022-10-16 RX ADMIN — FOLIC ACID 1 MG: 1 TABLET ORAL at 07:29

## 2022-10-16 RX ADMIN — Medication 3 MG: at 00:48

## 2022-10-16 RX ADMIN — INSULIN LISPRO 4 UNITS: 100 INJECTION, SOLUTION INTRAVENOUS; SUBCUTANEOUS at 07:31

## 2022-10-16 RX ADMIN — APIXABAN 5 MG: 5 TABLET, FILM COATED ORAL at 21:19

## 2022-10-16 RX ADMIN — CARVEDILOL 25 MG: 25 TABLET, FILM COATED ORAL at 18:03

## 2022-10-16 RX ADMIN — Medication 3 MG: at 21:19

## 2022-10-16 RX ADMIN — LEVOTHYROXINE SODIUM 250 MCG: 0.12 TABLET ORAL at 07:29

## 2022-10-16 RX ADMIN — INSULIN GLARGINE-YFGN 18 UNITS: 100 INJECTION, SOLUTION SUBCUTANEOUS at 21:18

## 2022-10-16 RX ADMIN — ASPIRIN 81 MG: 81 TABLET, FILM COATED ORAL at 07:29

## 2022-10-16 RX ADMIN — ROPINIROLE 1 MG: 1 TABLET, FILM COATED ORAL at 21:19

## 2022-10-16 RX ADMIN — APIXABAN 5 MG: 5 TABLET, FILM COATED ORAL at 07:29

## 2022-10-16 RX ADMIN — PANTOPRAZOLE SODIUM 40 MG: 40 TABLET, DELAYED RELEASE ORAL at 07:29

## 2022-10-16 RX ADMIN — CARVEDILOL 25 MG: 25 TABLET, FILM COATED ORAL at 07:30

## 2022-10-16 RX ADMIN — INSULIN LISPRO 3 UNITS: 100 INJECTION, SOLUTION INTRAVENOUS; SUBCUTANEOUS at 12:45

## 2022-10-16 NOTE — THERAPY TREATMENT NOTE
Patient Name: Zaina Martinez  : 1965    MRN: [id number]                              Today's Date: 10/16/2022       Admit Date: 10/12/2022    Visit Dx:     ICD-10-CM ICD-9-CM   1. Hyperkalemia  E87.5 276.7   2. Nausea and vomiting, unspecified vomiting type  R11.2 787.01   3. ESRD needing dialysis ([facility])  N18.6 585.6    Z99.2    4. Uncontrolled type 2 diabetes mellitus with hyperglycemia ([facility])  E11.65 250.02   5. Hyponatremia  E87.1 276.1     Patient Active Problem List   Diagnosis   • Renal insufficiency   • Hypertensive disorder   • Hypothyroidism   • Type 2 diabetes mellitus with kidney complication, with long-term current use of insulin ([facility])   • Rheumatoid arthritis ([facility])   • Angioedema   • Esophageal dysmotility   • Anemia   • Medically noncompliant   • Myocardial infarction due to demand ischemia ([facility])   • Enteritis   • PRES (posterior reversible encephalopathy syndrome)   • Urine retention   • Klebsiella infection   • Superficial thrombophlebitis   • Generalized weakness   • ESRD (end stage renal disease) ([facility])   • CAD (coronary artery disease)   • Abnormal urinalysis   • Chronic diastolic CHF (congestive heart failure) ([facility])   • Pyelonephritis   • Calculus of gallbladder with acute on chronic cholecystitis without obstruction   • Pleural effusion on right   • Anemia due to chronic kidney disease, on chronic dialysis ([facility])   • Abnormal findings on diagnostic imaging of other specified body structures   • Acute upper respiratory infection   • Agitation   • Alkaline phosphatase raised   • Casts present in urine   • Cellulitis of toe   • Hip pain   • Community acquired pneumonia   • Depressive disorder   • Diarrhea of presumed infectious origin   • Difficult or painful urination   • Disease due to severe acute respiratory syndrome coronavirus 2 (SARS-CoV-2)   • Dyspnea   • Encounter for follow-up examination after completed treatment for conditions other than malignant neoplasm   • H/O: hypothyroidism   •  Hyperlipidemia   • Hypomagnesemia   • Intractable vomiting with nausea   • Leukocytosis   • Luetscher's syndrome   • Need for influenza vaccination   • Restless legs   • Noncompliance with treatment   • Shoulder pain   • Acute UTI (urinary tract infection)   • Metabolic encephalopathy   • Abnormal findings on diagnostic imaging of abdomen   • Status post cholecystectomy   • Hyponatremia   • Leukocytosis   • Acute metabolic encephalopathy   • Encephalopathy, toxic   • Acute CVA (cerebrovascular accident) (HCC)   • Intracranial hemorrhage (HCC)   • Stroke (HCC)   • Abnormal urinalysis   • Diabetic muscle infarction (HCC)   • Other insomnia   • Altered mental status   • History of stroke- R MCA s/p TPA with subsequent ICH with debility   • Heart murmur   • Bradycardia   • Left lower lobe pneumonia   • Metabolic encephalopathy   • Pericardial effusion   • Pleural effusion   • Atrial flutter (HCC)   • Chronic systolic CHF (congestive heart failure) (HCC)   • Thrombus of venous dialysis catheter (HCC)   • Sepsis without acute organ dysfunction (HCC)   • Type 2 diabetes mellitus with hyperglycemia, with long-term current use of insulin (HCC)   • Acute respiratory failure with hypoxia (HCC)   • Acute on chronic systolic CHF (congestive heart failure) (HCC)   • Hyperkalemia     Past Medical History:   Diagnosis Date   • Acute CVA (cerebrovascular accident) (HCC) 5/1/2022   • Acute on chronic diastolic CHF (congestive heart failure) (HCC)    • CAD (coronary artery disease) 12/20/2021   • Diabetes (HCC)    • Disease of thyroid gland    • GERD (gastroesophageal reflux disease)    • Hyperlipidemia 11/30/2018   • Hypertension    • Rheumatoid arthritis (HCC)      Past Surgical History:   Procedure Laterality Date   • CHOLECYSTECTOMY WITH INTRAOPERATIVE CHOLANGIOGRAM N/A 1/10/2022    Procedure: Laparoscopic cholecystectomy with intraoperative cholangiogram;  Surgeon: Ramana Raygoza MD;  Location: [facility];  Service:  General;  Laterality: N/A;   • CYSTOSCOPY W/ URETERAL STENT PLACEMENT Left 9/29/2022    Procedure: CYSTOSCOPY URETERAL STENT INSERTION WITH RETROGRADE PYELOGRAM;  Surgeon: Bryant Phan Jr., MD;  Location: [facility];  Service: Urology;  Laterality: Left;   • EYE SURGERY     • HYSTERECTOMY     • INSERTION HEMODIALYSIS CATHETER N/A 12/6/2021    Procedure: HEMODIALYSIS CATHETER INSERTION;  Surgeon: Keli Salazar MD;  Location: [facility] 18/19;  Service: Vascular;  Laterality: N/A;   • INSERTION HEMODIALYSIS CATHETER N/A 5/3/2022    Procedure: TUNNELED CATHETER PLACEMENT;  Surgeon: Keli Salazar MD;  Location: [facility] OR;  Service: Vascular;  Laterality: N/A;   • MUSCLE BIOPSY Left 6/15/2022    Procedure: Left quadriceps muscle biopsy;  Surgeon: Marques Ma MD;  Location: [facility];  Service: Neurosurgery;  Laterality: Left;      General Information     Row Name 10/16/22 1445          Physical Therapy Time and Intention    Document Type therapy note (daily note)  -     Mode of Treatment physical therapy;individual therapy  -     Row Name 10/16/22 1445          General Information    Patient Profile Reviewed yes  -     Existing Precautions/Restrictions fall  -     Row Name 10/16/22 1445          Cognition    Orientation Status (Cognition) oriented x 4  -     Row Name 10/16/22 1445          Safety Issues, Functional Mobility    Impairments Affecting Function (Mobility) endurance/activity tolerance;strength  -           User Key  (r) = Recorded By, (t) = Taken By, (c) = Cosigned By    Initials Name Provider Type    Dana López PT Physical Therapist               Mobility     Row Name 10/16/22 1446          Bed Mobility    Supine-Sit [place] (Bed Mobility) minimum assist (75% patient effort);contact guard  -     Assistive Device (Bed Mobility) bed rails;head of bed elevated  -     Row Name 10/16/22 1446          Bed-Chair Transfer    Bed-Chair  [place] (Transfers) minimum assist (75% patient effort);contact guard  -OLIVER     Assistive Device (Bed-Chair Transfers) walker, front-wheeled  -OLIVER     Row Name 10/16/22 1446          Sit-Stand Transfer    Sit-Stand [place] (Transfers) contact guard  -OLIVER     Assistive Device (Sit-Stand Transfers) walker, front-wheeled  -OLIVER     Row Name 10/16/22 1446          Gait/Stairs (Locomotion)    [place] Level (Gait) minimum assist (75% patient effort)  -OLIVER     Assistive Device (Gait) walker, front-wheeled  -OLIVER     Distance in Feet (Gait) 40'  -OLIVER     Deviations/Abnormal Patterns (Gait) stride length decreased;gait speed decreased;base of support, narrow  -OLIVER     Comment, (Gait/Stairs) decreased speed, improved steadiness  -OLIVER           User Key  (r) = Recorded By, (t) = Taken By, (c) = Cosigned By    Initials Name Provider Type    Dana López PT Physical Therapist               Obj/Interventions     Row Name 10/16/22 1447          Motor Skills    Therapeutic Exercise other (see comments)  AP/QS/GS/HS/HIP ABD-ADD/LAQ X 10  -OLIVER     Row Name 10/16/22 1447          Balance    Static Sitting Balance standby assist  -OLIVER     Dynamic Sitting Balance contact guard  -OLIVER     Position, Sitting Balance unsupported;sitting edge of bed  -OLIVER     Static Standing Balance contact guard  -OLIVER     Dynamic Standing Balance minimal assist;contact guard  -OLIVER     Position/Device Used, Standing Balance walker, front-wheeled  -OLIVER           User Key  (r) = Recorded By, (t) = Taken By, (c) = Cosigned By    Initials Name Provider Type    Dana López PT Physical Therapist               Goals/Plan    No documentation.                Clinical Impression     Row Name 10/16/22 1447          Pain    Pretreatment Pain Rating 0/10 - no pain  -OLIVER     Posttreatment Pain Rating 0/10 - no pain  -OLIVER     Row Name 10/16/22 1447          Plan of Care Review    Plan of Care Reviewed With patient  -OLIVER     Progress no change  -OLIVER      Outcome Evaluation Ms. Martinez agreeable for skilled PT this AM and accompained by her brother upon arrival. She required increased assistance for supine-sit with Philip. CGA for sit-stand and she continues to demonstrate ability to ambulate 40' with RWx and CGA/Philip. Patient remains self limiting due to feeling SOB despite not presenting with labored breathing and SpO2 remaining >94%. Patient returned to chair with alarm activated. Encouraged patient to attempting increasing distance next session. She verbalized understanding. She remains a candidate for skilled PT.  -     Row Name 10/16/22 144          Therapy Assessment/Plan (PT)    Rehab Potential (PT) good, to achieve stated therapy goals  -     Criteria for Skilled Interventions Met (PT) yes  -OLIVER     Therapy Frequency (PT) 6 times/wk  -     Row Name 10/16/22 1444          Vital Signs    O2 Delivery Pre Treatment room air  -OLIVER     O2 Delivery Intra Treatment room air  -OLIVER     O2 Delivery Post Treatment room air  -OLIVER     Pre Patient Position Supine  -OLIVER     Intra Patient Position Standing  -OLIVER     Post Patient Position Sitting  -OLIVER     Row Name 10/16/22 1446          Positioning and Restraints    Pre-Treatment Position in bed  -OLIVER     Post Treatment Position chair  -OLIVER     In Chair reclined;call light within reach;encouraged to call for assist;exit alarm on;legs elevated  -OLIVER           User Key  (r) = Recorded By, (t) = Taken By, (c) = Cosigned By    Initials Name Provider Type    Dana López, PT Physical Therapist               Outcome Measures     Row Name 10/16/22 8238          How much help from another person do you currently need...    Turning from your back to your side while in flat bed without using bedrails? 4  -OLIVER     Moving from lying on back to sitting on the side of a flat bed without bedrails? 3  -OLIVER     Moving to and from a bed to a chair (including a wheelchair)? 3  -OLIVER     Standing up from a chair using your arms (e.g.,  wheelchair, bedside chair)? 3  -OLIVER     Climbing 3-5 steps with a railing? 2  -OLIVER     To walk in hospital room? 3  -OLIVER     AM-PAC 6 Clicks Score (PT) 18  -OLIVER     Highest level of mobility 6 --> Walked 10 steps or more  -OLIVER     Row Name 10/16/22 1450          Functional Assessment    Outcome Measure Options AM-PAC 6 Clicks Basic Mobility (PT)  -OLIVER           User Key  (r) = Recorded By, (t) = Taken By, (c) = Cosigned By    Initials Name Provider Type    Dana López, PT Physical Therapist                             Physical Therapy Education     Title: PT OT SLP Therapies (Done)     Topic: Physical Therapy (Done)     Point: Mobility training (Done)     Learning Progress Summary           Patient Acceptance, E,TB, VU by OLIVER at 10/16/2022 1451    Acceptance, E, VU by SANTY at 10/14/2022 1545    Acceptance, E, VU by SANTY at 10/14/2022 1527    Acceptance, E, VU,NR by MYA at 10/14/2022 0954    Acceptance, E, VU,NR by MYA at 10/13/2022 2147   Family Acceptance, E,TB, VU by OLIVER at 10/16/2022 1451                   Point: Home exercise program (Done)     Learning Progress Summary           Patient Acceptance, E,TB, VU by OLIVER at 10/16/2022 1451    Acceptance, E, VU by AG at 10/14/2022 1545    Acceptance, E, VU by AG at 10/14/2022 1527    Acceptance, E, VU,NR by MYA at 10/14/2022 0954    Acceptance, E, VU,NR by MYA at 10/13/2022 2147   Family Acceptance, E,TB, VU by OLIVER at 10/16/2022 1451                   Point: Body mechanics (Done)     Learning Progress Summary           Patient Acceptance, E,TB, VU by OLIVER at 10/16/2022 1451    Acceptance, E, VU by AG at 10/14/2022 1545    Acceptance, E, VU by AG at 10/14/2022 1527    Acceptance, E, VU,NR by MYA at 10/14/2022 0954    Acceptance, E, VU,NR by MYA at 10/13/2022 2147   Family Acceptance, E,TB, VU by OLIVER at 10/16/2022 1451                   Point: Precautions (Done)     Learning Progress Summary           Patient Acceptance, E,TB, VU by OLIVER at 10/16/2022 1451    Acceptance, E, VU by  AG at 10/14/2022 1545    Acceptance, E, VU by AG at 10/14/2022 1527    Acceptance, E, VU,NR by  at 10/14/2022 0954    Acceptance, E, VU,NR by  at 10/13/2022 2147   Family Acceptance, E,TB, VU by OLIVER at 10/16/2022 1451                               User Key     Initials Effective Dates Name Provider Type Discipline     06/16/21 -  Guillermina Nava, PT Physical Therapist PT    OLIVER 05/19/21 -  Dana Feldman, PT Physical Therapist PT     06/16/21 -  Laina Friedman, RN Registered Nurse Nurse              PT Recommendation and Plan     Plan of Care Reviewed With: patient  Progress: no change  Outcome Evaluation: Ms. Martinez agreeable for skilled PT this AM and accompained by her brother upon arrival. She required increased assistance for supine-sit with Philip. CGA for sit-stand and she continues to demonstrate ability to ambulate 40' with RWx and CGA/Philip. Patient remains self limiting due to feeling SOB despite not presenting with labored breathing and SpO2 remaining >94%. Patient returned to chair with alarm activated. Encouraged patient to attempting increasing distance next session. She verbalized understanding. She remains a candidate for skilled PT.     Time Calculation:    PT Charges     Row Name 10/16/22 1451             Time Calculation    Start Time 1040  -OLIVER      Stop Time 1104  -OLIVER      Time Calculation (min) 24 min  -OLIVER      PT Received On 10/16/22  -OLIVER      PT - Next Appointment 10/17/22  -OLIVER      PT Goal Re-Cert Due Date 10/20/22  -OLIVER         Timed Charges    11679 - PT Therapeutic Exercise Minutes 9  -OLIVER      23587 - PT Therapeutic Activity Minutes 15  -OLIVER         Total Minutes    Timed Charges Total Minutes 24  -OLIVER       Total Minutes 24  -OLIVER            User Key  (r) = Recorded By, (t) = Taken By, (c) = Cosigned By    Initials Name Provider Type    Dana López, PT Physical Therapist              Therapy Charges for Today     Code Description Service Date Service  Provider Modifiers Qty    [id number]  PT THER PROC EA 15 MIN 10/16/2022 Dana Feldman, PT GP 1    [id number]  PT THERAPEUTIC ACT EA 15 MIN 10/16/2022 Dana Feldman, PT GP 1          PT G-Codes  Outcome Measure Options: AM-PAC 6 Clicks Basic Mobility (PT)  AM-PAC 6 Clicks Score (PT): 18    Dana Feldman, PT  10/16/2022

## 2022-10-16 NOTE — PROGRESS NOTES
[facility] Progress Note      Patient Name: Zaina Martinez  : 1965  MRN: [id number]  Primary Care Physician:  Jane Kyle NP  Date of admission: 10/12/2022    Subjective     Interval History:     Overnight patient continues to be in A. Fib  Between 110 and 130 beats per minute  Not associated with chest pain, shortness of breath palpitations    Patient denies any abdominal pain and is able to tolerate    Patient denies any nausea or emesis    No fevers or chills    Patient was seen by urology yesterday    Review of Systems:   As noted above    Objective     Vitals:   Temp:  [98 °F (36.7 °C)-98.7 °F (37.1 °C)] 98 °F (36.7 °C)  Heart Rate:  [] 117  Resp:  [16] 16  BP: (118-139)/() 138/87  Flow (L/min):  [1] 1    Intake/Output Summary (Last 24 hours) at 10/16/2022 1023  Last data filed at 10/15/2022 2203  Gross per 24 hour   Intake 1260 ml   Output 0 ml   Net 1260 ml       Physical Exam:    Constitutional: Awake, alert, NAD, chronically ill  Qb 400, 145/92, , fluid removal goal 3 L  HEENT: Sclera anicteric, no conjunctival injection  Neck: Supple, no carotid bruit, trachea at midline, no JVD  Resp: No BS in lower bases, few crackles midfields, nonlabored   Vascular: Right chest TDC  Cardiovascular: Irregularly irregular, tachycardic, 2/6M, no rub  Gastrointestinal: BS +, soft, nontender, +distended  : No palpable bladder  Musculoskeletal:  No significant edema, + clubbing  Psychiatric: Flat affect, depressed mood, cooperative, oriented  Neurologic: moving all extremities, normal speech and mental status  Skin: Warm and dry    Scheduled Meds:     apixaban, 5 mg, Oral, Q12H  aspirin, 81 mg, Oral, Daily  carvedilol, 25 mg, Oral, BID With Meals  dilTIAZem CD, 180 mg, Oral, Q24H  folic acid, 1 mg, Oral, Daily  insulin glargine, 18 Units, Subcutaneous, Nightly  insulin lispro, 0-7 Units, Subcutaneous, TID AC  levothyroxine, 250 mcg, Oral, Q AM  melatonin, 3 mg,  Oral, Nightly  pantoprazole, 40 mg, Oral, Daily  rOPINIRole, 1 mg, Oral, Nightly  sertraline, 150 mg, Oral, Daily      IV Meds:        Results Reviewed:   I have personally reviewed the results from the time of this admission to 10/16/2022 10:23 EDT     Results from last 7 days   Lab Units 10/15/22  0600 10/14/22  0612 10/13/22  0827 10/12/22  2351 10/12/22  1811   SODIUM mmol/L 125* 128* 130*   < > 129*   POTASSIUM mmol/L 5.0 5.0 4.2   < > 6.7*   CHLORIDE mmol/L 91* 90* 94*   < > 90*   CO2 mmol/L 23.4 25.9 26.0   < > 19.9*   BUN mg/dL 35* 37* 19   < > 44*   CREATININE mg/dL 2.85* 3.42* 2.44*   < > 4.93*   CALCIUM mg/dL 7.7* 7.8* 8.1*   < > 8.5*   BILIRUBIN mg/dL  --   --   --   --  0.4   ALK PHOS U/L  --   --   --   --  300*   ALT (SGPT) U/L  --   --   --   --  26   AST (SGOT) U/L  --   --   --   --  23   GLUCOSE mg/dL 395* 245* 290*   < > 441*    < > = values in this interval not displayed.       Estimated Creatinine Clearance: 19.7 mL/min (A) (by C-G formula based on SCr of 2.85 mg/dL (H)).    Results from last 7 days   Lab Units 10/15/22  0600 10/12/22  2351   MAGNESIUM mg/dL  --  2.0   PHOSPHORUS mg/dL 2.5  --              Results from last 7 days   Lab Units 10/16/22  0521 10/15/22  0600 10/13/22  0827 10/12/22  1811   WBC 10*3/mm3 8.42 7.58 8.15 9.16   HEMOGLOBIN g/dL 11.3* 11.5* 12.2 13.2   PLATELETS 10*3/mm3 167 162 219 234             Assessment / Plan     ASSESSMENT:  1.  ESRD: Status post right internal jugular TDC.  Undergoing hemodialysis on a Monday, Wednesday and Friday schedule.  Optimizing volume status  2.  Anasarca with bilateral pleural effusions, ascites, and anasarca; has chronic small pericardial effusion, with August '22 echo negative for tamponade, optimizing volume status with hemodialysis  3.  Atrial fibrillation/flutter with rapid rate.  Closely monitor on telemetry, as per cardiology management  4.  Hypertension of ESRD, exacerbated by volume overload  5.  History of noncompliance with  fluid restriction, potassium restriction, and medications in general  6.  Diabetes Mellitus type 2 .Continue insulin regimen / hypoglycemic regimen .Hypoglycemic protocol . POC glucose 4 x day .Diabetic diet  7.  Anemia of ESRD:  Hgb > 10 . Holding ANDRAE protocol   8.  Recent left-sided hydroureteronephrosis with hematuria ,s/p left stent placement on 9/29/22 ,  seen by urology ,  w repeat CT abdomen , suggested to continue medical management . No need for urological intervention during this admission   9.  Hypothyroidism, severe:  noncompliant with thyroid medication    PLAN:  -Continue hemodialysis during her admission, on MWF schedule , order for tomorrow   -Adjust medications to GFR  -Continue to follow closely  -Continue surveillance labs       Thank you for involving us in the care of Zaina Martinez.  Please feel free to call with any questions.    Jose Robbins MD  10/16/22  10:23 EDT    [facility]  [phone]

## 2022-10-16 NOTE — PROGRESS NOTES
[facility] Acute Inpt [facility]    Spoke with pt regarding acute vs subacute rehab. Pt's  has been helping pt at home since CVA.  Daughter comes over while  is working.  Will continue to monitor progress with therapies to determine the most appropriate level of rehab for pt upon DC.    Ruth Whiting RN  Acute Rehab Admission Nurse

## 2022-10-16 NOTE — PROGRESS NOTES
Name: Zaina Martinez ADMIT: 10/12/2022   : 1965  PCP: Jane Kyle NP    MRN: [id number] LOS: 4 days   AGE/SEX: 56 y.o. female  ROOM: [facility] [place]     Subjective   Subjective   Intermittently in atrial flutter overnight.    Review of Systems   Constitutional: Negative for chills and fever.   Respiratory: Negative for cough and shortness of breath.    Cardiovascular: Negative for chest pain and leg swelling.   Gastrointestinal: Negative for abdominal pain, diarrhea and nausea.   Genitourinary: Negative for dysuria.   Skin: Negative for color change.          Objective   Objective   Vital Signs  Temp:  [98 °F (36.7 °C)] 98 °F (36.7 °C)  Heart Rate:  [] 117  Resp:  [16] 16  BP: (118-139)/() 138/87  SpO2:  [92 %-97 %] 94 %  on  Flow (L/min):  [1] 1;   Device (Oxygen Therapy): room air  Body mass index is 22.82 kg/m².  Physical Exam  Constitutional:       General: She is not in acute distress.     Appearance: She is not diaphoretic.      Comments: Right tunneled dialysis catheter.   HENT:      Head: Normocephalic and atraumatic.   Cardiovascular:      Rate and Rhythm: Normal rate and regular rhythm.   Pulmonary:      Effort: Pulmonary effort is normal. No respiratory distress.      Breath sounds: No wheezing.   Abdominal:      General: There is no distension.      Palpations: Abdomen is soft.      Tenderness: There is no abdominal tenderness.   Musculoskeletal:         General: No swelling or deformity. Normal range of motion.   Skin:     General: Skin is warm and dry.   Neurological:      General: No focal deficit present.      Mental Status: She is alert and oriented to person, place, and time.         Results Review     I reviewed the patient's new clinical results.  Results from last 7 days   Lab Units 10/16/22  0521 10/15/22  0600 10/13/22  0827 10/12/22  1811   WBC 10*3/mm3 8.42 7.58 8.15 9.16   HEMOGLOBIN g/dL 11.3* 11.5* 12.2 13.2   PLATELETS 10*3/mm3 167 162 219 234     Results from last 7  days   Lab Units 10/15/22  0600 10/14/22  0612 10/13/22  0827 10/12/22  2351   SODIUM mmol/L 125* 128* 130* 125*   POTASSIUM mmol/L 5.0 5.0 4.2 6.8*   CHLORIDE mmol/L 91* 90* 94* 91*   CO2 mmol/L 23.4 25.9 26.0 18.5*   BUN mg/dL 35* 37* 19 45*   CREATININE mg/dL 2.85* 3.42* 2.44* 4.76*   GLUCOSE mg/dL 395* 245* 290* 344*   Estimated Creatinine Clearance: 19.7 mL/min (A) (by C-G formula based on SCr of 2.85 mg/dL (H)).  Results from last 7 days   Lab Units 10/15/22  0600 10/12/22  1811   ALBUMIN g/dL 3.10* 3.30*   BILIRUBIN mg/dL  --  0.4   ALK PHOS U/L  --  300*   AST (SGOT) U/L  --  23   ALT (SGPT) U/L  --  26     Results from last 7 days   Lab Units 10/15/22  0600 10/14/22  0612 10/13/22  0827 10/12/22  2351 10/12/22  1811   CALCIUM mg/dL 7.7* 7.8* 8.1* 8.5* 8.5*   ALBUMIN g/dL 3.10*  --   --   --  3.30*   MAGNESIUM mg/dL  --   --   --  2.0  --    PHOSPHORUS mg/dL 2.5  --   --   --   --        COVID19   Date Value Ref Range Status   09/03/2022 Not Detected Not Detected - Ref. Range Final   08/25/2022 Not Detected Not Detected - Ref. Range Final     Hemoglobin A1C   Date/Time Value Ref Range Status   10/14/2022 0612 9.70 (H) 4.80 - 5.60 % Final     Glucose   Date/Time Value Ref Range Status   10/16/2022 1135 209 (H) 70 - 130 mg/dL Final     Comment:     Meter: [id number] : [id number] Harvey Melgozany QUENTIN   10/16/2022 0615 258 (H) 70 - 130 mg/dL Final     Comment:     Meter: [id number] : [id number] Silvia Sanchesa NA   10/15/2022 2007 93 70 - 130 mg/dL Final     Comment:     Meter: [id number] : [id number] Wiggjolynn Yesenia NA   10/15/2022 1625 204 (H) 70 - 130 mg/dL Final     Comment:     Meter: [id number] : [id number] Gabriel Mendez NA   10/15/2022 1136 322 (H) 70 - 130 mg/dL Final     Comment:     Meter: [id number] : [id number] Gabriel Mendez NA   10/15/2022 0532 395 (H) 70 - 130 mg/dL Final     Comment:     Meter: [id number] : [id number] Guanaco Villalba NA   10/14/2022 2135 403 (C) 70 - 130 mg/dL Final      Comment:     Result Not Confirmed Meter: [id number] : [id number] Therese Morales RN       CT Abdomen Pelvis Without Contrast  Narrative: ABDOMEN AND PELVIS CT WITHOUT CONTRAST     HISTORY: Nausea vomiting and lipase, lengthy list of medical issues.     Radiation dose reduction techniques were utilized, including automated  exposure control and exposure modulation based on body size.     Noncontrast abdomen and pelvis CT is provided. Correlation with  noncontrast CT July 27, 2022.     FINDINGS: Atelectasis or pneumonia in the posterior lung bases  bilaterally with bilateral pleural effusions and diffuse third spacing.  Left ureteral stent is positioned as expected. No hydronephrosis.  Extensive atheromatous vascular calcification with no abnormally dilated  bowel. Prior cholecystectomy. Renal parenchyma appears somewhat  atrophic. Solid and parenchyma otherwise appears normal. Partly  calcified, well-demarcated lesion in the mesentery 31 x 19 mm is smaller  and more mineralized today than on July 10 exam and may represent a  small aging hematoma.     Chronic T12 superior plate deformity with mild loss of height  anteriorly; no paraspinal inflammatory change.     Impression: Diffuse third spacing with pleural effusions and atelectasis  versus pneumonia the lung bases. Left ureteral stent is positioned as  expected. No acute abnormality identified.     This report was finalized on 10/12/2022 8:11 PM by Dr. Adonis Burch M.D.     XR Chest 1 View  Narrative: PORTABLE CHEST X-RAY     HISTORY: Tachycardia.     TECHNIQUE: Oblique portable chest x-ray correlated chest x-ray September 26.     FINDINGS: Right IJ dialysis catheter is again observed. There is  pulmonary vascular engorgement and pulmonary edema suspected small  pleural effusions bilaterally and pneumonia or atelectasis in the left  retrocardiac lung base. The appearance is similar to that observed on  x-ray last month.     Impression: Volume overload with  pulmonary edema and possible left base  atelectasis or pneumonia.     This report was finalized on 10/12/2022 6:40 PM by Dr. Adonis Burch M.D.       I reviewed the patient's daily medications.  Scheduled Medications  apixaban, 5 mg, Oral, Q12H  aspirin, 81 mg, Oral, Daily  carvedilol, 25 mg, Oral, BID With Meals  dilTIAZem CD, 180 mg, Oral, Q24H  folic acid, 1 mg, Oral, Daily  insulin glargine, 18 Units, Subcutaneous, Nightly  insulin lispro, 0-7 Units, Subcutaneous, TID AC  levothyroxine, 250 mcg, Oral, Q AM  melatonin, 3 mg, Oral, Nightly  pantoprazole, 40 mg, Oral, Daily  rOPINIRole, 1 mg, Oral, Nightly  sertraline, 150 mg, Oral, Daily    Infusions   Diet  Diet Regular; Consistent Carbohydrate, Renal, Low Potassium, Daily Fluid Restriction; Other; 1,200; 2 gm (50 mEq)         I have personally reviewed:  [x]  Laboratory   []  Microbiology   []  Radiology   []  EKG/Telemetry   []  Cardiology/Vascular   []  Pathology   []  Records     Assessment/Plan     Active Hospital Problems    Diagnosis  POA   • **Hyperkalemia [E87.5]  Yes   • Status post cholecystectomy [Z90.49]  Not Applicable   • Hyponatremia [E87.1]  Yes   • ESRD (end stage renal disease) (HCC) [N18.6]  Yes   • Generalized weakness [R53.1]  Yes   • Type 2 diabetes mellitus with kidney complication, with long-term current use of insulin (HCC) [E11.29, Z79.4]  Not Applicable      Resolved Hospital Problems   No resolved problems to display.       56 y.o. female admitted with Hyperkalemia.    ESRD on hemodialysis with hypokalemia and hyponatremia: Dialysis resumed.  Nephrology input appreciated.    Hypoxic respiratory failure, resolved:   Secondary to fluid overload from missing dialysis.      Atrial flutter:  Resume Coreg and diltiazem.  Resume Eliquis.  Patient has had multiple episodes of heart rates in the 130s.  Cardiology consulted.  Is currently on Coreg 25 twice daily and diltiazem 180 for 24.  Blood pressures acceptable    Type 2 diabetes:     Accu-Cheks above goal.  Increase at bedtime Lantus to 18 units.     Hypothyroid: TSH 79.  Reported on 250 mcg of levothyroxine daily.  Suspect noncompliance.    Used to see endocrinologist, but they retired.  Will send ambulatory referral on discharge.    Elevated troponin-patient without any chest pain.  Troponins downtrending     Hematuria in setting of recent cystoscopy and ureteral stent-urology evaluated patient, state this is to be expected risk of recent stent.  Plan for outpatient follow-up.    · Eliquis for DVT prophylaxis.  · Full code.  · Discussed with patient and nursing staff.  · Anticipate discharge Home versus BAR timing yet to be determined.  PM&R requesting OT consult to complete work-up for possible BAR admission      Jerson Bull MD  [facility]  10/16/22  12:23 EDT

## 2022-10-16 NOTE — PLAN OF CARE
Goal Outcome Evaluation:  Plan of Care Reviewed With: patient        Progress: no change  Outcome Evaluation: Ms. Martinez agreeable for skilled PT this AM and accompained by her brother upon arrival. She required increased assistance for supine-sit with Philip. CGA for sit-stand and she continues to demonstrate ability to ambulate 40' with RWx and CGA/Philip. Patient remains self limiting due to feeling SOB despite not presenting with labored breathing and SpO2 remaining >94%. Patient returned to chair with alarm activated. Encouraged patient to attempting increasing distance next session. She verbalized understanding. She remains a candidate for skilled PT.

## 2022-10-16 NOTE — PROGRESS NOTES
[facility] Acute Inpt [facility]    Evaluation in progress.  Discussed with daughter difference between acute and subacute rehab. Daughter is pt's caregiver and is available 24/7.  In order for daughter to be able to provide care, pt would have to be at level she was prior to stroke. Pt was able to walk short distances in home with walker.  Daughter assisted her with sponge bath. Will continue to monitor progress with therapies to determine most appropriate level of rehab.  Will need precert from insurance.    Ruth Whiting RN  Acute Rehab Admission Nurse

## 2022-10-16 NOTE — PLAN OF CARE
Goal Outcome Evaluation:  Plan of Care Reviewed With: patient        Progress: no change  Outcome Evaluation: Monitor pain,labs,and vitals. VSS. No c/o pain on current shift. Educated patient and family on fluid restriction and importance of following diet orders. Purewick. Bed alarm. Will continue to monitor.

## 2022-10-16 NOTE — PROGRESS NOTES
"Patient Care Team:  Jane Kyle NP as PCP - General (Family Medicine)  Adonis Fraga Jr., MD as Consulting Physician (Hematology and Oncology)    CC:   Atrial flutter    Interval History:   Shortness of breath is better    Objective   Vital Signs  Temp:  [98 °F (36.7 °C)-98.1 °F (36.7 °C)] 98.1 °F (36.7 °C)  Heart Rate:  [] 92  Resp:  [16] 16  BP: (118-139)/() 122/99    Intake/Output Summary (Last 24 hours) at 10/16/2022 1317  Last data filed at 10/16/2022 1200  Gross per 24 hour   Intake 1500 ml   Output 0 ml   Net 1500 ml     Flowsheet Rows    Flowsheet Row First Filed Value   Admission Height 157.5 cm (62\") Documented at 10/12/2022 2204   Admission Weight 56.6 kg (124 lb 12.5 oz) Documented at 10/12/2022 2204            ROS    GENERAL    CARDIAC no chest pain    PULMONARY    Physical Exam:      Physical Exam  /99 (BP Location: Left arm, Patient Position: Sitting)   Pulse 92   Temp 98.1 °F (36.7 °C) (Oral)   Resp 16   Ht 157.5 cm (62\")   Wt 56.6 kg (124 lb 12.5 oz)   SpO2 94%   BMI 22.82 kg/m²     General appearance: NAD, conversant   Eyes: anicteric sclerae, moist conjunctivae; no lid-lag; PERRLA   HENT: Atraumatic; oropharynx clear with moist mucous membranes and no mucosal ulcerations;  normal hard and soft palate   Neck: Trachea midline; FROM, supple, no thyromegaly or lymphadenopathy   Lungs: CTA, with normal respiratory effort and no intercostal retractions   CV: S1-S2 irregular, no murmurs, no rub, no gallop   Abdomen: Soft, non-tender; no masses or HSM   Extremities: No peripheral edema or extremity lymphadenopathy  Skin: Normal temperature, turgor and texture; no rash, ulcers or subcutaneous nodules   Psych: Appropriate affect, alert and oriented to person, place and time                                   Results Review:    Results from last 7 days   Lab Units 10/15/22  0600   SODIUM mmol/L 125*   POTASSIUM mmol/L 5.0   CHLORIDE mmol/L 91*   CO2 mmol/L 23.4   BUN mg/dL 35* "   CREATININE mg/dL 2.85*   GLUCOSE mg/dL 395*   CALCIUM mg/dL 7.7*     Results from last 7 days   Lab Units 10/12/22  2351 10/12/22  1811   TROPONIN T ng/mL 0.168* 0.194*     Results from last 7 days   Lab Units 10/16/22  0521 10/15/22  0600 10/13/22  0827   WBC 10*3/mm3 8.42 7.58 8.15   HEMOGLOBIN g/dL 11.3* 11.5* 12.2   HEMATOCRIT % 35.7 36.0 40.1   PLATELETS 10*3/mm3 167 162 219         Results from last 7 days   Lab Units 10/12/22  2351   MAGNESIUM mg/dL 2.0           apixaban, 5 mg, Oral, Q12H  aspirin, 81 mg, Oral, Daily  carvedilol, 25 mg, Oral, BID With Meals  dilTIAZem CD, 180 mg, Oral, Q24H  folic acid, 1 mg, Oral, Daily  insulin glargine, 18 Units, Subcutaneous, Nightly  insulin lispro, 0-7 Units, Subcutaneous, TID AC  levothyroxine, 250 mcg, Oral, Q AM  melatonin, 3 mg, Oral, Nightly  pantoprazole, 40 mg, Oral, Daily  rOPINIRole, 1 mg, Oral, Nightly  sertraline, 150 mg, Oral, Daily             I reviewed the patient's new clinical results.  I personally viewed and interpreted the patient's EKG/Telemetry data    Assessment/Plan  Patient Active Problem List   Diagnosis   • Renal insufficiency   • Hypertensive disorder   • Hypothyroidism   • Type 2 diabetes mellitus with kidney complication, with long-term current use of insulin ([facility])   • Rheumatoid arthritis ([facility])   • Angioedema   • Esophageal dysmotility   • Anemia   • Medically noncompliant   • Myocardial infarction due to demand ischemia ([facility])   • Enteritis   • PRES (posterior reversible encephalopathy syndrome)   • Urine retention   • Klebsiella infection   • Superficial thrombophlebitis   • Generalized weakness   • ESRD (end stage renal disease) ([facility])   • CAD (coronary artery disease)   • Abnormal urinalysis   • Chronic diastolic CHF (congestive heart failure) ([facility])   • Pyelonephritis   • Calculus of gallbladder with acute on chronic cholecystitis without obstruction   • Pleural effusion on right   • Anemia due to chronic kidney disease, on chronic  dialysis (HCC)   • Abnormal findings on diagnostic imaging of other specified body structures   • Acute upper respiratory infection   • Agitation   • Alkaline phosphatase raised   • Casts present in urine   • Cellulitis of toe   • Hip pain   • Community acquired pneumonia   • Depressive disorder   • Diarrhea of presumed infectious origin   • Difficult or painful urination   • Disease due to severe acute respiratory syndrome coronavirus 2 (SARS-CoV-2)   • Dyspnea   • Encounter for follow-up examination after completed treatment for conditions other than malignant neoplasm   • H/O: hypothyroidism   • Hyperlipidemia   • Hypomagnesemia   • Intractable vomiting with nausea   • Leukocytosis   • Luetscher's syndrome   • Need for influenza vaccination   • Restless legs   • Noncompliance with treatment   • Shoulder pain   • Acute UTI (urinary tract infection)   • Metabolic encephalopathy   • Abnormal findings on diagnostic imaging of abdomen   • Status post cholecystectomy   • Hyponatremia   • Leukocytosis   • Acute metabolic encephalopathy   • Encephalopathy, toxic   • Acute CVA (cerebrovascular accident) (HCC)   • Intracranial hemorrhage (HCC)   • Stroke (HCC)   • Abnormal urinalysis   • Diabetic muscle infarction (HCC)   • Other insomnia   • Altered mental status   • History of stroke- R MCA s/p TPA with subsequent ICH with debility   • Heart murmur   • Bradycardia   • Left lower lobe pneumonia   • Metabolic encephalopathy   • Pericardial effusion   • Pleural effusion   • Atrial flutter (HCC)   • Chronic systolic CHF (congestive heart failure) (HCC)   • Thrombus of venous dialysis catheter (HCC)   • Sepsis without acute organ dysfunction (HCC)   • Type 2 diabetes mellitus with hyperglycemia, with long-term current use of insulin (HCC)   • Acute respiratory failure with hypoxia (HCC)   • Acute on chronic systolic CHF (congestive heart failure) ([facility])   • Hyperkalemia     Freddie Mazariegos MD  10/16/22  13:17 EDT              ICD-10-CM ICD-9-CM   1. Hyperkalemia  E87.5 276.7   2. Nausea and vomiting, unspecified vomiting type  R11.2 787.01   3. ESRD needing dialysis ([facility])  N18.6 585.6    Z99.2    4. Uncontrolled type 2 diabetes mellitus with hyperglycemia ([facility])  E11.65 250.02   5. Hyponatremia  E87.1 276.1       Vital signs are normal blood pressure 1/22/1977    Patient on anticoagulation atrial flutter rate appears to be controlled    Continue current management at this stage  [unfilled]

## 2022-10-16 NOTE — PLAN OF CARE
Problem: Fall Injury Risk  Goal: Absence of Fall and Fall-Related Injury  Outcome: Ongoing, Progressing  Intervention: Identify and Manage Contributors  Description: Develop a fall prevention plan with the patient and caregiver/family.  Provide reorientation, appropriate sensory stimulation and routines with changes in mental status to decrease risk of fall.  Promote use of personal vision and auditory aids.  Assess assistance level required for safe and effective self-care; provide support as needed, such as toileting, mobilization. For age 65 and older, implement timed toileting with assistance.  Encourage physical activity, such as performance of mobility and self-care at highest level of patient ability, multicomponent exercise program and provision of appropriate assistive devices.  If fall occurs, assess the severity of injury; implement fall injury protocol. Determine the cause and revise fall injury prevention plan.  Regularly review medication contribution to fall risk; adjust medication administration times to minimize risk of falling.  Consider risk related to polypharmacy and age.  Balance adequate pain management with potential for oversedation.  Recent Flowsheet Documentation  Taken 10/16/2022 1550 by Dana Traore RN  Medication Review/Management: medications reviewed  Taken 10/16/2022 1400 by Dana Traore RN  Medication Review/Management: medications reviewed  Taken 10/16/2022 1245 by Dana Traore, RN  Medication Review/Management: medications reviewed  Taken 10/16/2022 0953 by Dana Traore, RN  Medication Review/Management: medications reviewed  Taken 10/16/2022 0729 by Dana Traore, RN  Medication Review/Management: medications reviewed  Intervention: Promote Injury-Free Environment  Description: Provide a safe, barrier-free environment that encourages independent activity.  Keep care area uncluttered and well-lighted.  Determine need for increased observation or monitoring.  Avoid use  of devices that minimize mobility, such as restraints or indwelling urinary catheter.  Recent Flowsheet Documentation  Taken 10/16/2022 1550 by Dana Traore RN  Safety Promotion/Fall Prevention:   activity supervised   assistive device/personal items within reach   clutter free environment maintained   fall prevention program maintained   nonskid shoes/slippers when out of bed   room organization consistent   safety round/check completed  Taken 10/16/2022 1400 by Dana Traore RN  Safety Promotion/Fall Prevention:   activity supervised   assistive device/personal items within reach   clutter free environment maintained   fall prevention program maintained   nonskid shoes/slippers when out of bed   room organization consistent   safety round/check completed  Taken 10/16/2022 1245 by Dana Traore RN  Safety Promotion/Fall Prevention: safety round/check completed  Taken 10/16/2022 0953 by Dana Traore RN  Safety Promotion/Fall Prevention: safety round/check completed  Taken 10/16/2022 0729 by Dana Traore RN  Safety Promotion/Fall Prevention:   activity supervised   assistive device/personal items within reach   clutter free environment maintained   fall prevention program maintained   nonskid shoes/slippers when out of bed   room organization consistent   safety round/check completed     Problem: Adult Inpatient Plan of Care  Goal: Plan of Care Review  Outcome: Ongoing, Progressing  Flowsheets (Taken 10/16/2022 1700)  Progress: improving  Plan of Care Reviewed With: patient  Outcome Evaluation: VSS, heart rate controlled better today, blood glucose improving with regular intervention of supplemental insulin and diet control, patient doing better with fluid restriction today, will continue to monitor  Goal: Patient-Specific Goal (Individualized)  Outcome: Ongoing, Progressing  Goal: Absence of Hospital-Acquired Illness or Injury  Outcome: Ongoing, Progressing  Intervention: Identify and Manage Fall  Risk  Description: Perform standard risk assessment on admission using a validated tool or comprehensive approach appropriate to the patient; reassess fall risk frequently, with change in status or transfer to another level of care.  Communicate fall injury risk to interprofessional healthcare team.  Determine need for increased observation, equipment and environmental modification, such as low bed, signage and supportive, nonskid footwear.  Adjust safety measures to individual developmental age, stage and identified risk factors.  Reinforce the importance of safety and physical activity with patient and family.  Perform regular intentional rounding to assess need for position change, pain assessment and personal needs, including assistance with toileting.  Recent Flowsheet Documentation  Taken 10/16/2022 1550 by Dana Traore, RN  Safety Promotion/Fall Prevention:   activity supervised   assistive device/personal items within reach   clutter free environment maintained   fall prevention program maintained   nonskid shoes/slippers when out of bed   room organization consistent   safety round/check completed  Taken 10/16/2022 1400 by Dana Traore, RN  Safety Promotion/Fall Prevention:   activity supervised   assistive device/personal items within reach   clutter free environment maintained   fall prevention program maintained   nonskid shoes/slippers when out of bed   room organization consistent   safety round/check completed  Taken 10/16/2022 1245 by Dana Traore, RN  Safety Promotion/Fall Prevention: safety round/check completed  Taken 10/16/2022 0953 by Dana Traore, RN  Safety Promotion/Fall Prevention: safety round/check completed  Taken 10/16/2022 0729 by Dana Traore, RN  Safety Promotion/Fall Prevention:   activity supervised   assistive device/personal items within reach   clutter free environment maintained   fall prevention program maintained   nonskid shoes/slippers when out of bed   room  organization consistent   safety round/check completed  Intervention: Prevent Skin Injury  Description: Perform a screening for skin injury risk, such as pressure or moisture associated skin damage on admission and at regular intervals throughout hospital stay.  Keep all areas of skin (especially folds) clean and dry.  Maintain adequate skin hydration.  Relieve and redistribute pressure and protect bony prominences; implement measures based on patient-specific risk factors.  Match turning and repositioning schedule to clinical condition.  Encourage weight shift frequently; assist with reposition if unable to complete independently.  Float heels off bed; avoid pressure on the Achilles tendon.  Keep skin free from extended contact with medical devices.  Encourage functional activity and mobility, as early as tolerated.  Use aids (e.g., slide boards, mechanical lift) during transfer.  Recent Flowsheet Documentation  Taken 10/16/2022 1550 by Dana Traore RN  Body Position: position changed independently  Taken 10/16/2022 1400 by Dana Traore RN  Body Position: position changed independently  Skin Protection:   adhesive use limited   incontinence pads utilized   tubing/devices free from skin contact  Taken 10/16/2022 1245 by Dana Traore RN  Body Position: position changed independently  Taken 10/16/2022 0953 by Dana Traore RN  Body Position: position changed independently  Taken 10/16/2022 0729 by Dana Traore RN  Body Position: position changed independently  Skin Protection:   adhesive use limited   incontinence pads utilized   tubing/devices free from skin contact  Intervention: Prevent and Manage VTE (Venous Thromboembolism) Risk  Description: Assess for VTE (venous thromboembolism) risk.  Encourage and assist with early ambulation.  Initiate and maintain compression or other therapy, as indicated, based on identified risk in accordance with organizational protocol and provider order.  Encourage both  active and passive leg exercises while in bed, if unable to ambulate.  Recent Flowsheet Documentation  Taken 10/16/2022 1550 by Dana Traore RN  Activity Management: up in chair  Taken 10/16/2022 1400 by Dana Traore RN  VTE Prevention/Management: (eliquis) other (see comments)  Range of Motion: active ROM (range of motion) encouraged  Taken 10/16/2022 0729 by Dana Traore RN  VTE Prevention/Management: (eliquis) other (see comments)  Range of Motion: active ROM (range of motion) encouraged  Intervention: Prevent Infection  Description: Maintain skin and mucous membrane integrity; promote hand, oral and pulmonary hygiene.  Optimize fluid balance, nutrition, sleep and glycemic control to maximize infection resistance.  Identify potential sources of infection early to prevent or mitigate progression of infection (e.g., wound, lines, devices).  Evaluate ongoing need for invasive devices; remove promptly when no longer indicated.  Recent Flowsheet Documentation  Taken 10/16/2022 1400 by Dana Traore RN  Infection Prevention:   environmental surveillance performed   hand hygiene promoted   personal protective equipment utilized   single patient room provided   visitors restricted/screened  Taken 10/16/2022 0729 by Dana Traore RN  Infection Prevention:   environmental surveillance performed   hand hygiene promoted   personal protective equipment utilized   single patient room provided   visitors restricted/screened  Goal: Optimal Comfort and Wellbeing  Outcome: Ongoing, Progressing  Intervention: Provide Person-Centered Care  Description: Use a family-focused approach to care.  Develop trust and rapport by proactively providing information, encouraging questions, addressing concerns and offering reassurance.  Acknowledge emotional response to hospitalization.  Recognize and utilize personal coping strategies.  Honor spiritual and cultural preferences.  Recent Flowsheet Documentation  Taken 10/16/2022 1400  by Dana Traore RN  Trust Relationship/Rapport:   care explained   choices provided   empathic listening provided   emotional support provided   questions encouraged   questions answered   reassurance provided   thoughts/feelings acknowledged  Taken 10/16/2022 0729 by Dana Traore, RN  Trust Relationship/Rapport:   care explained   choices provided   emotional support provided   empathic listening provided   questions answered   questions encouraged   reassurance provided   thoughts/feelings acknowledged  Goal: Readiness for Transition of Care  Outcome: Ongoing, Progressing     Problem: Skin Injury Risk Increased  Goal: Skin Health and Integrity  Outcome: Ongoing, Progressing  Intervention: Optimize Skin Protection  Description: Perform a full pressure injury risk assessment, as indicated by screening, upon admission to care unit.  Reassess skin (injury risk, full inspection) frequently (e.g., scheduled interval, with change in condition) to provide optimal early detection and prevention.  Maintain adequate tissue perfusion (e.g., encourage fluid balance; avoid crossing legs, constrictive clothing or devices) to promote tissue oxygenation.  Maintain head of bed at lowest degree of elevation tolerated, considering medical condition and other restrictions.  Avoid positioning onto an area that remains reddened.  Minimize incontinence and moisture (e.g., toileting schedule; moisture-wicking pad, diaper or incontinence collection device; skin moisture barrier).  Cleanse skin promptly and gently when soiled utilizing a pH-balanced cleanser.  Relieve and redistribute pressure (e.g., scheduled position changes, weight shifts, use of support surface, medical device repositioning, protective dressing application, use of positioning device, microclimate control, use of pressure-injury-monitor  Encourage increased activity, such as sitting in a chair at the bedside or early mobilization, when able to tolerate.  Recent  Flowsheet Documentation  Taken 10/16/2022 1550 by Dana Traore RN  Head of Bed ([facility]) Positioning: HOB elevated  Taken 10/16/2022 1400 by Dana Traore RN  Pressure Reduction Techniques:   frequent weight shift encouraged   weight shift assistance provided  Head of Bed ([facility]) Positioning: HOB elevated  Pressure Reduction Devices: alternating pressure pump (ADD)  Skin Protection:   adhesive use limited   incontinence pads utilized   tubing/devices free from skin contact  Taken 10/16/2022 1245 by Dana Traore RN  Head of Bed ([facility]) Positioning: HOB elevated  Taken 10/16/2022 0953 by Dana Traore RN  Head of Bed ([facility]) Positioning: HOB elevated  Taken 10/16/2022 0729 by Dana Traore RN  Pressure Reduction Techniques:   frequent weight shift encouraged   weight shift assistance provided  Head of Bed ([facility]) Positioning: [facility] elevated  Pressure Reduction Devices: alternating pressure pump (ADD)  Skin Protection:   adhesive use limited   incontinence pads utilized   tubing/devices free from skin contact   Goal Outcome Evaluation:  Plan of Care Reviewed With: patient        Progress: improving  Outcome Evaluation: VSS, heart rate controlled better today, blood glucose improving with regular intervention of supplemental insulin and diet control, patient doing better with fluid restriction today, will continue to monitor

## 2022-10-17 ENCOUNTER — READMISSION MANAGEMENT (OUTPATIENT)
Dept: CALL CENTER | Facility: HOSPITAL | Age: 57
End: 2022-10-17

## 2022-10-17 VITALS
SYSTOLIC BLOOD PRESSURE: 147 MMHG | BODY MASS INDEX: 23.37 KG/M2 | DIASTOLIC BLOOD PRESSURE: 105 MMHG | HEIGHT: 62 IN | OXYGEN SATURATION: 98 % | HEART RATE: 72 BPM | RESPIRATION RATE: 16 BRPM | TEMPERATURE: 97 F | WEIGHT: 126.98 LBS

## 2022-10-17 LAB
BASOPHILS # BLD AUTO: 0.03 10*3/MM3 (ref 0–0.2)
BASOPHILS NFR BLD AUTO: 0.4 % (ref 0–1.5)
DEPRECATED RDW RBC AUTO: 45.8 FL (ref 37–54)
EOSINOPHIL # BLD AUTO: 0.1 10*3/MM3 (ref 0–0.4)
EOSINOPHIL NFR BLD AUTO: 1.4 % (ref 0.3–6.2)
ERYTHROCYTE [DISTWIDTH] IN BLOOD BY AUTOMATED COUNT: 14.9 % (ref 12.3–15.4)
GLUCOSE BLDC GLUCOMTR-MCNC: 101 MG/DL (ref 70–130)
GLUCOSE BLDC GLUCOMTR-MCNC: 106 MG/DL (ref 70–130)
GLUCOSE BLDC GLUCOMTR-MCNC: 110 MG/DL (ref 70–130)
GLUCOSE BLDC GLUCOMTR-MCNC: 267 MG/DL (ref 70–130)
GLUCOSE BLDC GLUCOMTR-MCNC: 55 MG/DL (ref 70–130)
GLUCOSE BLDC GLUCOMTR-MCNC: 71 MG/DL (ref 70–130)
HCT VFR BLD AUTO: 34 % (ref 34–46.6)
HGB BLD-MCNC: 11 G/DL (ref 12–15.9)
IMM GRANULOCYTES # BLD AUTO: 0.03 10*3/MM3 (ref 0–0.05)
IMM GRANULOCYTES NFR BLD AUTO: 0.4 % (ref 0–0.5)
LYMPHOCYTES # BLD AUTO: 1.48 10*3/MM3 (ref 0.7–3.1)
LYMPHOCYTES NFR BLD AUTO: 20.6 % (ref 19.6–45.3)
MCH RBC QN AUTO: 27.4 PG (ref 26.6–33)
MCHC RBC AUTO-ENTMCNC: 32.4 G/DL (ref 31.5–35.7)
MCV RBC AUTO: 84.8 FL (ref 79–97)
MONOCYTES # BLD AUTO: 0.44 10*3/MM3 (ref 0.1–0.9)
MONOCYTES NFR BLD AUTO: 6.1 % (ref 5–12)
NEUTROPHILS NFR BLD AUTO: 5.09 10*3/MM3 (ref 1.7–7)
NEUTROPHILS NFR BLD AUTO: 71.1 % (ref 42.7–76)
NRBC BLD AUTO-RTO: 0 /100 WBC (ref 0–0.2)
PLATELET # BLD AUTO: 158 10*3/MM3 (ref 140–450)
PMV BLD AUTO: 10.8 FL (ref 6–12)
RBC # BLD AUTO: 4.01 10*6/MM3 (ref 3.77–5.28)
WBC NRBC COR # BLD: 7.17 10*3/MM3 (ref 3.4–10.8)

## 2022-10-17 PROCEDURE — G0257 UNSCHED DIALYSIS ESRD PT HOS: HCPCS

## 2022-10-17 PROCEDURE — A9270 NON-COVERED ITEM OR SERVICE: HCPCS | Performed by: STUDENT IN AN ORGANIZED HEALTH CARE EDUCATION/TRAINING PROGRAM

## 2022-10-17 PROCEDURE — 82962 GLUCOSE BLOOD TEST: CPT

## 2022-10-17 PROCEDURE — A9270 NON-COVERED ITEM OR SERVICE: HCPCS | Performed by: INTERNAL MEDICINE

## 2022-10-17 PROCEDURE — 63710000001 APIXABAN 5 MG TABLET: Performed by: INTERNAL MEDICINE

## 2022-10-17 PROCEDURE — 63710000001 LEVOTHYROXINE 125 MCG TABLET: Performed by: STUDENT IN AN ORGANIZED HEALTH CARE EDUCATION/TRAINING PROGRAM

## 2022-10-17 PROCEDURE — 63710000001 SERTRALINE 50 MG TABLET: Performed by: STUDENT IN AN ORGANIZED HEALTH CARE EDUCATION/TRAINING PROGRAM

## 2022-10-17 PROCEDURE — G0378 HOSPITAL OBSERVATION PER HR: HCPCS

## 2022-10-17 PROCEDURE — 97166 OT EVAL MOD COMPLEX 45 MIN: CPT

## 2022-10-17 PROCEDURE — 63710000001 ASPIRIN 81 MG TABLET DELAYED-RELEASE: Performed by: STUDENT IN AN ORGANIZED HEALTH CARE EDUCATION/TRAINING PROGRAM

## 2022-10-17 PROCEDURE — 96375 TX/PRO/DX INJ NEW DRUG ADDON: CPT

## 2022-10-17 PROCEDURE — 63710000001 INSULIN LISPRO (HUMAN) PER 5 UNITS: Performed by: STUDENT IN AN ORGANIZED HEALTH CARE EDUCATION/TRAINING PROGRAM

## 2022-10-17 PROCEDURE — 25010000002 ONDANSETRON PER 1 MG: Performed by: NURSE PRACTITIONER

## 2022-10-17 PROCEDURE — 63710000001 DILTIAZEM CD 180 MG CAPSULE SUSTAINED-RELEASE 24 HR: Performed by: STUDENT IN AN ORGANIZED HEALTH CARE EDUCATION/TRAINING PROGRAM

## 2022-10-17 PROCEDURE — 97530 THERAPEUTIC ACTIVITIES: CPT

## 2022-10-17 PROCEDURE — 63710000001 SERTRALINE 100 MG TABLET: Performed by: STUDENT IN AN ORGANIZED HEALTH CARE EDUCATION/TRAINING PROGRAM

## 2022-10-17 PROCEDURE — 85025 COMPLETE CBC W/AUTO DIFF WBC: CPT | Performed by: STUDENT IN AN ORGANIZED HEALTH CARE EDUCATION/TRAINING PROGRAM

## 2022-10-17 PROCEDURE — 25010000002 HEPARIN (PORCINE) PER 1000 UNITS: Performed by: INTERNAL MEDICINE

## 2022-10-17 PROCEDURE — 63710000001 PANTOPRAZOLE 40 MG TABLET DELAYED-RELEASE: Performed by: STUDENT IN AN ORGANIZED HEALTH CARE EDUCATION/TRAINING PROGRAM

## 2022-10-17 PROCEDURE — 63710000001 CARVEDILOL 25 MG TABLET: Performed by: STUDENT IN AN ORGANIZED HEALTH CARE EDUCATION/TRAINING PROGRAM

## 2022-10-17 PROCEDURE — 63710000001 FOLIC ACID 1 MG TABLET: Performed by: STUDENT IN AN ORGANIZED HEALTH CARE EDUCATION/TRAINING PROGRAM

## 2022-10-17 RX ORDER — DILTIAZEM HYDROCHLORIDE 180 MG/1
180 CAPSULE, COATED, EXTENDED RELEASE ORAL
Status: DISCONTINUED | OUTPATIENT
Start: 2022-10-18 | End: 2022-10-17 | Stop reason: HOSPADM

## 2022-10-17 RX ORDER — DILTIAZEM HYDROCHLORIDE 240 MG/1
240 CAPSULE, COATED, EXTENDED RELEASE ORAL
Status: DISCONTINUED | OUTPATIENT
Start: 2022-10-18 | End: 2022-10-17

## 2022-10-17 RX ADMIN — PANTOPRAZOLE SODIUM 40 MG: 40 TABLET, DELAYED RELEASE ORAL at 08:13

## 2022-10-17 RX ADMIN — SERTRALINE 150 MG: 100 TABLET, FILM COATED ORAL at 08:13

## 2022-10-17 RX ADMIN — DILTIAZEM HYDROCHLORIDE 180 MG: 180 CAPSULE, EXTENDED RELEASE ORAL at 08:13

## 2022-10-17 RX ADMIN — FOLIC ACID 1 MG: 1 TABLET ORAL at 08:13

## 2022-10-17 RX ADMIN — ASPIRIN 81 MG: 81 TABLET, FILM COATED ORAL at 08:13

## 2022-10-17 RX ADMIN — CARVEDILOL 25 MG: 25 TABLET, FILM COATED ORAL at 17:26

## 2022-10-17 RX ADMIN — APIXABAN 5 MG: 5 TABLET, FILM COATED ORAL at 08:13

## 2022-10-17 RX ADMIN — CARVEDILOL 25 MG: 25 TABLET, FILM COATED ORAL at 08:13

## 2022-10-17 RX ADMIN — HEPARIN SODIUM 3800 UNITS: 1000 INJECTION INTRAVENOUS; SUBCUTANEOUS at 11:10

## 2022-10-17 RX ADMIN — ONDANSETRON 4 MG: 2 INJECTION INTRAMUSCULAR; INTRAVENOUS at 08:32

## 2022-10-17 RX ADMIN — INSULIN LISPRO 4 UNITS: 100 INJECTION, SOLUTION INTRAVENOUS; SUBCUTANEOUS at 17:26

## 2022-10-17 RX ADMIN — LEVOTHYROXINE SODIUM 250 MCG: 0.12 TABLET ORAL at 06:42

## 2022-10-17 NOTE — CASE MANAGEMENT/SOCIAL WORK
Continued Stay Note  [facility]     Patient Name: Zaina Martinez  MRN: [id number]  Today's Date: 10/17/2022    Admit Date: 10/12/2022    Plan: Home vs SNF   Discharge Plan     Row Name 10/17/22 1437       Plan    Plan Home vs SNF    Patient/Family in Agreement with Plan yes    Plan Comments Met with pt at bedside.  Discussed possible need for rehab.  Explained to pt that acute rehab had reviewed and do not feel she is appropriate for that program at this time.  Pt requests referral to [facility].  Referral placed in Epic.  Notified Marisol who will review.  JASMYNE Kang RN               Discharge Codes    No documentation.               Expected Discharge Date and Time     Expected Discharge Date Expected Discharge Time    Oct 17, 2022             Caroline Kang, RN

## 2022-10-17 NOTE — DISCHARGE PLACEMENT REQUEST
"Zaina Martinez (56 y.o. Female)     Date of Birth   1965    Social Security Number       Address   [address] [place] [address]    Home Phone   [phone]    MRN   [id number]       Anglican   None    Marital Status                               Admission Date   10/12/22    Admission Type   Emergency    Admitting Provider       Attending Provider   Jerson Bull MD    Department, Room/Bed   [address], E567/1       Discharge Date       Discharge Disposition       Discharge Destination                               Attending Provider: Jerson Bull MD    Allergies: Contrast Dye, Ibuprofen, Prochlorperazine    Isolation: None   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)   Code Status: CPR    Ht: 157.5 cm (62\")   Wt: 57.6 kg (126 lb 15.8 oz)    Admission Cmt: None   Principal Problem: Hyperkalemia [E87.5]                 Active Insurance as of 10/12/2022     Primary Coverage     Payor Plan Insurance Group Employer/Plan Group    ANTH MEDICARE REPLACEMENT ANTH MEDICARE ADVANTAGE [id number]     Payor Plan Address Payor Plan Phone Number Payor Plan Fax Number Effective Dates    [address] [phone]  1/1/2019 - None Entered    [facility] [address]       Subscriber Name Subscriber Birth Date Member ID       ZAINA MARTINEZ 1965 [id number]                 Emergency Contacts      (Rel.) Home Phone Work Phone Mobile Phone    Marv Martinez (Spouse) [phone] -- [phone]    JuanFiona bradford (Daughter) [phone] -- [phone]            "

## 2022-10-17 NOTE — CASE MANAGEMENT/SOCIAL WORK
Continued Stay Note  [facility]     Patient Name: Zaina Martinez  MRN: [id number]  Today's Date: 10/17/2022    Admit Date: 10/12/2022    Plan: Home with HH   Discharge Plan     Row Name 10/17/22 1633       Plan    Plan Home with HH    Patient/Family in Agreement with Plan yes    Plan Comments Pt now states she would like to return home with HH.  [facility] following.  No further needs identified.   to transport home.  JASMYNE Kang RN                                          Discharge Codes    No documentation.               Expected Discharge Date and Time     Expected Discharge Date Expected Discharge Time    Oct 17, 2022             Caroline Kang, RN

## 2022-10-17 NOTE — THERAPY EVALUATION
Patient Name: Zaina Martinez  : 1965    MRN: [id number]                              Today's Date: 10/17/2022       Admit Date: 10/12/2022    Visit Dx:     ICD-10-CM ICD-9-CM   1. Hyperkalemia  E87.5 276.7   2. Nausea and vomiting, unspecified vomiting type  R11.2 787.01   3. ESRD needing dialysis ([facility])  N18.6 585.6    Z99.2    4. Uncontrolled type 2 diabetes mellitus with hyperglycemia ([facility])  E11.65 250.02   5. Hyponatremia  E87.1 276.1     Patient Active Problem List   Diagnosis   • Renal insufficiency   • Hypertensive disorder   • Hypothyroidism   • Type 2 diabetes mellitus with kidney complication, with long-term current use of insulin ([facility])   • Rheumatoid arthritis ([facility])   • Angioedema   • Esophageal dysmotility   • Anemia   • Medically noncompliant   • Myocardial infarction due to demand ischemia ([facility])   • Enteritis   • PRES (posterior reversible encephalopathy syndrome)   • Urine retention   • Klebsiella infection   • Superficial thrombophlebitis   • Generalized weakness   • ESRD (end stage renal disease) ([facility])   • CAD (coronary artery disease)   • Abnormal urinalysis   • Chronic diastolic CHF (congestive heart failure) ([facility])   • Pyelonephritis   • Calculus of gallbladder with acute on chronic cholecystitis without obstruction   • Pleural effusion on right   • Anemia due to chronic kidney disease, on chronic dialysis ([facility])   • Abnormal findings on diagnostic imaging of other specified body structures   • Acute upper respiratory infection   • Agitation   • Alkaline phosphatase raised   • Casts present in urine   • Cellulitis of toe   • Hip pain   • Community acquired pneumonia   • Depressive disorder   • Diarrhea of presumed infectious origin   • Difficult or painful urination   • Disease due to severe acute respiratory syndrome coronavirus 2 (SARS-CoV-2)   • Dyspnea   • Encounter for follow-up examination after completed treatment for conditions other than malignant neoplasm   • H/O: hypothyroidism   •  Hyperlipidemia   • Hypomagnesemia   • Intractable vomiting with nausea   • Leukocytosis   • Luetscher's syndrome   • Need for influenza vaccination   • Restless legs   • Noncompliance with treatment   • Shoulder pain   • Acute UTI (urinary tract infection)   • Metabolic encephalopathy   • Abnormal findings on diagnostic imaging of abdomen   • Status post cholecystectomy   • Hyponatremia   • Leukocytosis   • Acute metabolic encephalopathy   • Encephalopathy, toxic   • Acute CVA (cerebrovascular accident) ([facility])   • Intracranial hemorrhage (HCC)   • Stroke (HCC)   • Abnormal urinalysis   • Diabetic muscle infarction (HCC)   • Other insomnia   • Altered mental status   • History of stroke- R MCA s/p TPA with subsequent ICH with debility   • Heart murmur   • Bradycardia   • Left lower lobe pneumonia   • Metabolic encephalopathy   • Pericardial effusion   • Pleural effusion   • Atrial flutter (HCC)   • Chronic systolic CHF (congestive heart failure) (HCC)   • Thrombus of venous dialysis catheter (HCC)   • Sepsis without acute organ dysfunction (HCC)   • Type 2 diabetes mellitus with hyperglycemia, with long-term current use of insulin (HCC)   • Acute respiratory failure with hypoxia (HCC)   • Acute on chronic systolic CHF (congestive heart failure) (HCC)   • Hyperkalemia     Past Medical History:   Diagnosis Date   • Acute CVA (cerebrovascular accident) (HCC) 5/1/2022   • Acute on chronic diastolic CHF (congestive heart failure) (HCC)    • CAD (coronary artery disease) 12/20/2021   • Diabetes (HCC)    • Disease of thyroid gland    • GERD (gastroesophageal reflux disease)    • Hyperlipidemia 11/30/2018   • Hypertension    • Rheumatoid arthritis (HCC)      Past Surgical History:   Procedure Laterality Date   • CHOLECYSTECTOMY WITH INTRAOPERATIVE CHOLANGIOGRAM N/A 1/10/2022    Procedure: Laparoscopic cholecystectomy with intraoperative cholangiogram;  Surgeon: Ramana Raygoza MD;  Location: [facility];  Service:  "General;  Laterality: N/A;   • CYSTOSCOPY W/ URETERAL STENT PLACEMENT Left 9/29/2022    Procedure: CYSTOSCOPY URETERAL STENT INSERTION WITH RETROGRADE PYELOGRAM;  Surgeon: Bryant Phan Jr., MD;  Location: [facility];  Service: Urology;  Laterality: Left;   • EYE SURGERY     • HYSTERECTOMY     • INSERTION HEMODIALYSIS CATHETER N/A 12/6/2021    Procedure: HEMODIALYSIS CATHETER INSERTION;  Surgeon: Keli Salazar MD;  Location: [facility] OR 18/19;  Service: Vascular;  Laterality: N/A;   • INSERTION HEMODIALYSIS CATHETER N/A 5/3/2022    Procedure: TUNNELED CATHETER PLACEMENT;  Surgeon: Keli Salazar MD;  Location: [facility] OR;  Service: Vascular;  Laterality: N/A;   • MUSCLE BIOPSY Left 6/15/2022    Procedure: Left quadriceps muscle biopsy;  Surgeon: Marques Ma MD;  Location: [facility];  Service: Neurosurgery;  Laterality: Left;      General Information     Row Name 10/17/22 0857          OT Time and Intention    Document Type evaluation  -SM     Mode of Treatment occupational therapy;individual therapy  -     Row Name 10/17/22 0857          General Information    Patient Profile Reviewed yes  -SM     Prior Level of Function min assist:;mod assist:;ADL's  Pt reports \"depends on the day\" but reports family assist with dressing, bathing, toileting, mobility tasks as needed since dc home from acute rehab following hx CVA  -SM     Existing Precautions/Restrictions fall  -SM     Barriers to Rehab previous functional deficit;medically complex  -     Row Name 10/17/22 0857          Occupational Profile    Environmental Supports and Barriers (Occupational Profile) Pt reports she has all home DME needed including BSC, w/c, rwx, shower chair  -SM     Row Name 10/17/22 0857          Living Environment    People in Home spouse  -SM     Row Name 10/17/22 0857          Home Main Entrance    Number of Stairs, Main Entrance --  ramp per PT note  -SM     Row Name 10/17/22 0857          " Cognition    Orientation Status (Cognition) oriented to;person;place  pt lethargic this morning and now answering all questions including year/month.  -     Row Name 10/17/22 0857          Safety Issues, Functional Mobility    Impairments Affecting Function (Mobility) endurance/activity tolerance;strength;balance  -           User Key  (r) = Recorded By, (t) = Taken By, (c) = Cosigned By    Initials Name Provider Type     Tesha Charles OT Occupational Therapist                 Mobility/ADL's     Row Name 10/17/22 0900          Bed Mobility    Supine-Sit [place] (Bed Mobility) moderate assist (50% patient effort);verbal cues  -     Sit-Supine [place] (Bed Mobility) moderate assist (50% patient effort);verbal cues  -     Assistive Device (Bed Mobility) head of bed elevated;bed rails  -[facility] Name 10/17/22 0900          Sit-Stand Transfer    Sit-Stand [place] (Transfers) minimum assist (75% patient effort);verbal cues  -     Assistive Device (Sit-Stand Transfers) walker, front-wheeled  -[facility] Name 10/17/22 0900          Functional Mobility    Functional Mobility- Ind. Level minimum assist (75% patient effort)  -     Functional Mobility- Device walker, front-wheeled  -     Functional Mobility- Comment pt able to take a few side steps up towards HOB to reposition self before returning to bed. Nausea limiting further activity today.  -     Row Name 10/17/22 0900          Activities of Daily Living    BADL Assessment/Intervention grooming;feeding  pt unable to tolerate futher ADLs today due to nausea  -     Row Name 10/17/22 0900          Grooming Assessment/Training    [place] Level (Grooming) grooming skills;wash face, hands;set up;supervision  -     Position (Grooming) edge of bed sitting  -     Row Name 10/17/22 0900          Self-Feeding Assessment/Training    [place] Level (Feeding) feeding skills;liquids to mouth;set up  -     Position (Self-Feeding)  sitting up in bed  -           User Key  (r) = Recorded By, (t) = Taken By, (c) = Cosigned By    Initials Name Provider Type    Tesha Ardon OT Occupational Therapist               Obj/Interventions     [facility] Name 10/17/22 0902          Sensory Assessment (Somatosensory)    Sensory Assessment (Somatosensory) UE sensation intact  -[facility] Name 10/17/22 0902          Range of Motion Comprehensive    General Range of Motion bilateral upper extremity ROM WFL  -[facility] Name 10/17/22 0902          Strength Comprehensive (MMT)    Comment, General Manual Muscle Testing (MMT) Assessment generalized weakness noted, BUE at least 3/5  -[facility] Name 10/17/22 0902          Motor Skills    Motor Skills functional endurance;coordination  -     Coordination WFL;bilateral;upper extremity  -     Functional Endurance fair/poor  -[facility] Name 10/17/22 0902          Balance    Static Sitting Balance supervision  -     Position, Sitting Balance sitting edge of bed  -     Static Standing Balance contact guard  -     Dynamic Standing Balance minimal assist  -     Position/Device Used, Standing Balance supported;walker, rolling  -           User Key  (r) = Recorded By, (t) = Taken By, (c) = Cosigned By    Initials Name Provider Type    Tesha Ardon OT Occupational Therapist               Goals/Plan     [facility] Name 10/17/22 0911          Transfer Goal 1 (OT)    Activity/Assistive Device (Transfer Goal 1, OT) transfers, all;toilet;commode, bedside without drop arms  -     [place] Level/Cues Needed (Transfer Goal 1, OT) standby assist  -     Time Frame (Transfer Goal 1, OT) short term goal (STG);2 weeks  -     Progress/Outcome (Transfer Goal 1, OT) goal ongoing  -[facility] Name 10/17/22 0911          Dressing Goal 1 (OT)    Activity/Device (Dressing Goal 1, OT) dressing skills, all  -     [place]/Cues Needed (Dressing Goal 1, OT) minimum assist (75% or more patient effort)  -      "Time Frame (Dressing Goal 1, OT) short term goal (STG);2 weeks  -SM     Progress/Outcome (Dressing Goal 1, OT) goal ongoing  -SM     Row Name 10/17/22 0911          Toileting Goal 1 (OT)    Activity/Device (Toileting Goal 1, OT) toileting skills, all  -SM     [place] Level/Cues Needed (Toileting Goal 1, OT) minimum assist (75% or more patient effort)  -SM     Time Frame (Toileting Goal 1, OT) short term goal (STG);2 weeks  -SM     Progress/Outcome (Toileting Goal 1, OT) goal ongoing  -SM     Row Name 10/17/22 0911          Therapy Assessment/Plan (OT)    Planned Therapy Interventions (OT) activity tolerance training;adaptive equipment training;BADL retraining;functional balance retraining;occupation/activity based interventions;patient/caregiver education/training;transfer/mobility retraining;strengthening exercise  -           User Key  (r) = Recorded By, (t) = Taken By, (c) = Cosigned By    Initials Name Provider Type    SM Tesha Charles OT Occupational Therapist               Clinical Impression     Row Name 10/17/22 0902          Pain Assessment    Pretreatment Pain Rating 0/10 - no pain  -SM     Posttreatment Pain Rating 0/10 - no pain  -SM     Row Name 10/17/22 0902          Plan of Care Review    Plan of Care Reviewed With patient  -SM     Outcome Evaluation Pt is a 56 y.o female admitted on 10/12 with weakness, nausea, vomitting, unable to attend HD appointment. She is being treated for hyperkalemia. She has complex PMH not limited to ESRD, stroke, DM. She reports she has been recieving assist from family since stroke in May 2022 at home and has \"good and bad days\". Today pt lethargic but awake as RN was at her bedside assisting with her IV. She requires mod A to peform bed mobility, slow to respond to OT questions but also reporting \"I didnt sleep good last night\". Then reporting \"I dont feel well\" and began to vomitting sitting EOB. RN notifed and in room attending. Limited engagement in " mobility and ADL tasks today due to pt not feeling well. She was able to stand up Min A then return to bed mod A. She may benefit from OT to address deficits with strength, endurance, balance to increase her independence with ADLs. OT recommending dc to SNF vs home with HH. Pt reports she has 24/7 assist at home between her spouse and daughter and has all needed DME including a w/c if needed.  -     Row Name 10/17/22 0902          Therapy Assessment/Plan (OT)    Rehab Potential (OT) fair, will monitor progress closely  -     Criteria for Skilled Therapeutic Interventions Met (OT) yes;skilled treatment is necessary  -     Therapy Frequency (OT) 3 times/wk  -     Row Name 10/17/22 0902          Therapy Plan Review/Discharge Plan (OT)    Anticipated Discharge Disposition (OT) home with 24/7 care;home with home health;skilled nursing facility  -     Row Name 10/17/22 0902          Vital Signs    Intra Systolic BP Rehab 132  -SM     Intra Treatment Diastolic BP 94  -SM     Intratreatment Heart Rate (beats/min) 112  -SM     Intra SpO2 (%) 96  -SM     O2 Delivery Intra Treatment room air  -     Row Name 10/17/22 0902          Positioning and Restraints    Pre-Treatment Position in bed  -SM     Post Treatment Position bed  -SM     In Bed side lying left;call light within reach;encouraged to call for assist;exit alarm on;notified ns  -           User Key  (r) = Recorded By, (t) = Taken By, (c) = Cosigned By    Initials Name Provider Type     Tesha Charles, OT Occupational Therapist               Outcome Measures     Row Name 10/17/22 0912          How much help from another is currently needed...    Putting on and taking off regular lower body clothing? 2  -SM     Bathing (including washing, rinsing, and drying) 2  -SM     Toileting (which includes using toilet bed pan or urinal) 2  -SM     Putting on and taking off regular upper body clothing 2  -SM     Taking care of personal grooming (such as brushing  teeth) 3  -SM     Eating meals 3  -SM     AM-PAC 6 Clicks Score (OT) 14  -     Row Name 10/17/22 0912          Functional Assessment    Outcome Measure Options AM-PAC 6 Clicks Daily Activity (OT)  -           User Key  (r) = Recorded By, (t) = Taken By, (c) = Cosigned By    Initials Name Provider Type     Tesha Charles OT Occupational Therapist                Occupational Therapy Education     Title: PT OT SLP Therapies (In Progress)     Topic: Occupational Therapy (In Progress)     Point: ADL training (Done)     Description:   Instruct learner(s) on proper safety adaptation and remediation techniques during self care or transfers.   Instruct in proper use of assistive devices.              Learning Progress Summary           Patient Acceptance, E, VU by  at 10/17/2022 0913    Comment: OT goals/POC discussed with pt                   Point: Home exercise program (Not Started)     Description:   Instruct learner(s) on appropriate technique for monitoring, assisting and/or progressing therapeutic exercises/activities.              Learner Progress:  Not documented in this visit.          Point: Precautions (Not Started)     Description:   Instruct learner(s) on prescribed precautions during self-care and functional transfers.              Learner Progress:  Not documented in this visit.          Point: Body mechanics (Not Started)     Description:   Instruct learner(s) on proper positioning and spine alignment during self-care, functional mobility activities and/or exercises.              Learner Progress:  Not documented in this visit.                      User Key     Initials Effective Dates Name Provider Type Discipline     04/02/20 -  Tesha Charles OT Occupational Therapist OT              OT Recommendation and Plan  Planned Therapy Interventions (OT): activity tolerance training, adaptive equipment training, BADL retraining, functional balance retraining, occupation/activity based  "interventions, patient/caregiver education/training, transfer/mobility retraining, strengthening exercise  Therapy Frequency (OT): 3 times/wk  Plan of Care Review  Plan of Care Reviewed With: patient  Outcome Evaluation: Pt is a 56 y.o female admitted on 10/12 with weakness, nausea, vomitting, unable to attend HD appointment. She is being treated for hyperkalemia. She has complex PMH not limited to ESRD, stroke, DM. She reports she has been recieving assist from family since stroke in May 2022 at home and has \"good and bad days\". Today pt lethargic but awake as RN was at her bedside assisting with her IV. She requires mod A to peform bed mobility, slow to respond to OT questions but also reporting \"I didnt sleep good last night\". Then reporting \"I dont feel well\" and began to vomitting sitting EOB. RN notifed and in room attending. Limited engagement in mobility and ADL tasks today due to pt not feeling well. She was able to stand up Min A then return to bed mod A. She may benefit from OT to address deficits with strength, endurance, balance to increase her independence with ADLs. OT recommending dc to SNF vs home with HH. Pt reports she has 24/7 assist at home between her spouse and daughter and has all needed DME including a w/c if needed.     Time Calculation:    Time Calculation- OT     Row Name 10/17/22 0913             Time Calculation- OT    OT Start Time 0821  -SM      OT Stop Time 0839  -      OT Time Calculation (min) 18 min  -SM      Total Timed Code Minutes- OT 10 minute(s)  -SM      OT Received On 10/17/22  -      OT - Next Appointment 10/19/22  -SM      OT Goal Re-Cert Due Date 10/31/22  -SM         Timed Charges    42645 - OT Therapeutic Activity Minutes 10  -SM         Untimed Charges    OT Eval/Re-eval Minutes 8  -SM         Total Minutes    Timed Charges Total Minutes 10  -SM      Untimed Charges Total Minutes 8  -SM       Total Minutes 18  -SM            User Key  (r) = Recorded By, (t) = " Taken By, (c) = Cosigned By    Initials Name Provider Type    Tesha Ardon OT Occupational Therapist              Therapy Charges for Today     Code Description Service Date Service Provider Modifiers Qty    [id number]  OT THERAPEUTIC ACT EA 15 MIN 10/17/2022 Tesha Charles OT GO 1    [id number]  OT EVAL MOD COMPLEXITY 2 10/17/2022 Tesha Charles OT GO 1               Tesha Charles OT  10/17/2022

## 2022-10-17 NOTE — DISCHARGE SUMMARY
Patient Name: Zaina Martinez  : 1965  MRN: [id number]    Date of Admission: 10/12/2022  Date of Discharge:  10/17/2022  Primary Care Physician: Jane Kyle NP      Chief Complaint:   Weakness - Generalized      Discharge Diagnoses     Active Hospital Problems    Diagnosis  POA   • **Hyperkalemia [E87.5]  Yes   • Status post cholecystectomy [Z90.49]  Not Applicable   • Hyponatremia [E87.1]  Yes   • ESRD (end stage renal disease) (HCC) [N18.6]  Yes   • Generalized weakness [R53.1]  Yes   • Type 2 diabetes mellitus with kidney complication, with long-term current use of insulin (HCC) [E11.29, Z79.4]  Not Applicable      Resolved Hospital Problems   No resolved problems to display.        Hospital Course       Ms. Martinez is a 56 y.o. female with history of HFrEF, ESRD on HD M/W/F, type 2 diabetes mellitus, CAD, atrial flutter, hypertension, hyperlipidemia, rheumatoid arthritis, hypothyroidism and anemia of end-stage renal disease who presented to [facility] with generalized weakness, and missed dialysis who presented to the hospital with potassium of 6.7.  Nephrology was consulted and she underwent dialysis with improvement in potassium level.  She was also noted to be in atrial flutter with rapid ventricular rate and cardiology was consulted.  She also had hematuria for a few days, and so urology was consulted, however no intervention is planned.  Her apixaban was held for a few days.  The big issue with her was hypothyroidism.  Her TSH was 79.  She is reportedly on 250 mcg of levothyroxine daily but I suspect noncompliance.  Her levothyroxine was resumed and she left the follow-up with a primary care provider, establish care with an endocrinologist for further management of hypothyroidism.     Day of Discharge       Physical Exam:  Temp:  [96.6 °F (35.9 °C)-98 °F (36.7 °C)] 96.6 °F (35.9 °C)  Heart Rate:  [] 114  Resp:  [16-20] 16  BP: (114-134)/(81-98) 120/84  Body mass index is 23.23  kg/m².  Physical Exam  Constitutional:       General: She is not in acute distress.  HENT:      Head: Normocephalic and atraumatic.   Cardiovascular:      Rate and Rhythm: Normal rate and regular rhythm.   Pulmonary:      Effort: Pulmonary effort is normal. No respiratory distress.   Abdominal:      General: There is no distension.      Palpations: Abdomen is soft.      Tenderness: There is no abdominal tenderness.   Neurological:      General: No focal deficit present.      Mental Status: She is alert and oriented to person, place, and time.         Consultants     Consult Orders (all) (From admission, onward)     Start     Ordered    10/17/22 0739  Inpatient Rehab Admission Consult  Once        Provider:  (Not yet assigned)    10/17/22 0740    10/15/22 0829  Inpatient Urology Consult  Once        Specialty:  Urology  Provider:  Bryant Phan Jr., MD    10/15/22 0829    10/15/22 0703  Inpatient Cardiology Consult  Once        Specialty:  Cardiology  Provider:  Farida Esqueda MD    10/15/22 0702    10/14/22 1317  Inpatient Physical Medicine Rehab Consult  Once        Specialty:  Physical Medicine and Rehabilitation  Provider:  Adonis Florentino MD    10/14/22 1316    10/14/22 1241  Inpatient Case Management  Consult  Once        Provider:  (Not yet assigned)    10/14/22 1241    10/13/22 1100  Inpatient Nutrition Consult  Once        Comments: Please review two-gram potassium restricted diet patient; ESRD; recurrent hyperkalemia   Provider:  (Not yet assigned)    10/13/22 1059    10/12/22 2237  Inpatient Nutrition Consult  Once        Provider:  (Not yet assigned)    10/12/22 2236    10/12/22 2026  [facility] (on-call MD unless specified) Details  Once        Specialty:  Hospitalist  Provider:  (Not yet assigned)    10/12/22 2025    10/12/22 1854  Nephrology (on -call MD unless specified)  Once        Specialty:  Nephrology  Provider:  (Not yet assigned)    10/12/22 1853              Procedures      Imaging Results (All)     Procedure Component Value Units Date/Time    CT Abdomen Pelvis Without Contrast [[id number]] Collected: 10/12/22 2005     Updated: 10/12/22 2014    Narrative:      ABDOMEN AND PELVIS CT WITHOUT CONTRAST     HISTORY: Nausea vomiting and lipase, lengthy list of medical issues.     Radiation dose reduction techniques were utilized, including automated  exposure control and exposure modulation based on body size.     Noncontrast abdomen and pelvis CT is provided. Correlation with  noncontrast CT July 27, 2022.     FINDINGS: Atelectasis or pneumonia in the posterior lung bases  bilaterally with bilateral pleural effusions and diffuse third spacing.  Left ureteral stent is positioned as expected. No hydronephrosis.  Extensive atheromatous vascular calcification with no abnormally dilated  bowel. Prior cholecystectomy. Renal parenchyma appears somewhat  atrophic. Solid and parenchyma otherwise appears normal. Partly  calcified, well-demarcated lesion in the mesentery 31 x 19 mm is smaller  and more mineralized today than on July 10 exam and may represent a  small aging hematoma.     Chronic T12 superior plate deformity with mild loss of height  anteriorly; no paraspinal inflammatory change.       Impression:      Diffuse third spacing with pleural effusions and atelectasis  versus pneumonia the lung bases. Left ureteral stent is positioned as  expected. No acute abnormality identified.     This report was finalized on 10/12/2022 8:11 PM by Dr. Adonis Burch M.D.       XR Chest 1 View [[id number]] Collected: 10/12/22 1838     Updated: 10/12/22 1843    Narrative:      PORTABLE CHEST X-RAY     HISTORY: Tachycardia.     TECHNIQUE: Oblique portable chest x-ray correlated chest x-ray September 26.     FINDINGS: Right IJ dialysis catheter is again observed. There is  pulmonary vascular engorgement and pulmonary edema suspected small  pleural effusions bilaterally and pneumonia or atelectasis in the  left  retrocardiac lung base. The appearance is similar to that observed on  x-ray last month.       Impression:      Volume overload with pulmonary edema and possible left base  atelectasis or pneumonia.     This report was finalized on 10/12/2022 6:40 PM by Dr. Adonis Burch M.D.             Pertinent Labs     Results from last 7 days   Lab Units 10/17/22  0554 10/16/22  0521 10/15/22  0600 10/13/22  0827   WBC 10*3/mm3 7.17 8.42 7.58 8.15   HEMOGLOBIN g/dL 11.0* 11.3* 11.5* 12.2   PLATELETS 10*3/mm3 158 167 162 219     Results from last 7 days   Lab Units 10/15/22  0600 10/14/22  0612 10/13/22  0827 10/12/22  2351   SODIUM mmol/L 125* 128* 130* 125*   POTASSIUM mmol/L 5.0 5.0 4.2 6.8*   CHLORIDE mmol/L 91* 90* 94* 91*   CO2 mmol/L 23.4 25.9 26.0 18.5*   BUN mg/dL 35* 37* 19 45*   CREATININE mg/dL 2.85* 3.42* 2.44* 4.76*   GLUCOSE mg/dL 395* 245* 290* 344*   Estimated Creatinine Clearance: 20 mL/min (A) (by C-G formula based on SCr of 2.85 mg/dL (H)).  Results from last 7 days   Lab Units 10/15/22  0600 10/12/22  1811   ALBUMIN g/dL 3.10* 3.30*   BILIRUBIN mg/dL  --  0.4   ALK PHOS U/L  --  300*   AST (SGOT) U/L  --  23   ALT (SGPT) U/L  --  26     Results from last 7 days   Lab Units 10/15/22  0600 10/14/22  0612 10/13/22  0827 10/12/22  2351 10/12/22  1811   CALCIUM mg/dL 7.7* 7.8* 8.1* 8.5* 8.5*   ALBUMIN g/dL 3.10*  --   --   --  3.30*   MAGNESIUM mg/dL  --   --   --  2.0  --    PHOSPHORUS mg/dL 2.5  --   --   --   --      Results from last 7 days   Lab Units 10/12/22  1811   LIPASE U/L 213*     Results from last 7 days   Lab Units 10/12/22  2351 10/12/22  1811   TROPONIN T ng/mL 0.168* 0.194*           Invalid input(s): LDLCALC  Results from last 7 days   Lab Units 10/13/22  1234   URINECX  50,000 CFU/mL Normal Urogenital Georgina       Test Results Pending at Discharge       Discharge Details        Discharge Medications      Changes to Medications      Instructions Start Date   insulin glargine 100 UNIT/ML  injection  Commonly known as: LANVIDA SEMGLEE  What changed: how much to take   15 Units, Subcutaneous, Nightly      insulin lispro 100 UNIT/ML injection  Commonly known as: ADMELOG  What changed: additional instructions   0-14 Units, Subcutaneous, 3 Times Daily Before Meals         Continue These Medications      Instructions Start Date   aspirin 81 MG EC tablet   81 mg, Oral, Daily      carvedilol 25 MG tablet  Commonly known as: COREG   25 mg, Oral, 2 Times Daily With Meals      dilTIAZem  MG 24 hr capsule  Commonly known as: CARDIZEM CD   180 mg, Oral, Every 24 Hours Scheduled      Eliquis 5 MG tablet tablet  Generic drug: apixaban   5 mg, Oral, Every 12 Hours Scheduled      folic acid 1 MG tablet  Commonly known as: FOLVITE   1 mg, Oral, Daily      levothyroxine 125 MCG tablet  Commonly known as: SYNTHROID, LEVOTHROID   250 mcg, Oral, Every Early Morning      melatonin 3 MG tablet   3 mg, Oral, Nightly      pantoprazole 40 MG EC tablet  Commonly known as: PROTONIX   40 mg, Oral, Daily      polyethylene glycol 17 g packet  Commonly known as: MIRALAX   17 g, Oral, Daily PRN      Procto-Med HC 2.5 % rectal cream  Generic drug: Hydrocortisone (Perianal)   Rectal, 2 Times Daily      rOPINIRole 1 MG tablet  Commonly known as: REQUIP   1 mg, Oral, Nightly, Take 1 hour before bedtime.      sertraline 50 MG tablet  Commonly known as: ZOLOFT   150 mg, Oral, Daily         Stop These Medications    predniSONE 5 MG tablet  Commonly known as: DELTASONE        ASK your doctor about these medications      Instructions Start Date   miconazole 2 % cream  Commonly known as: MICOTIN   1 application, Topical, Every 12 Hours Scheduled             Allergies   Allergen Reactions   • Contrast Dye Confusion   • Ibuprofen Other (See Comments)     Kidney disease   • Prochlorperazine Other (See Comments)     Dystonic reaction              Discharge Disposition:  Home or Self Care    Discharge Diet:  Diet Order   Procedures   • Diet  Regular; Consistent Carbohydrate, Renal, Low Potassium, Daily Fluid Restriction; Other; 1,200; 2 gm (50 mEq)       Discharge Activity: as tolerated       CODE STATUS:    Code Status and Medical Interventions:   Ordered at: 10/13/22 1904     Code Status (Patient has no pulse and is not breathing):    CPR (Attempt to Resuscitate)     Medical Interventions (Patient has pulse or is breathing):    Full Support     Release to patient:    Routine Release       Future Appointments   Date Time Provider Department Center   10/18/2022 10:30 AM Flor Wilkes APRN [facility]     Additional Instructions for the Follow-ups that You Need to Schedule     Ambulatory Referral to Home Health ([facility])   As directed      Face to Face Visit Date: 10/17/2022    Follow-up provider for Plan of Care?: I treated the patient in an acute care facility and will not continue treatment after discharge.    Follow-up provider: ILSA JACOBO [[id number]]    Reason/Clinical Findings: weakness    Describe mobility limitations that make leaving home difficult: Age related physicial debility    Nursing/Therapeutic Services Requested: Physical Therapy    PT orders: Total joint pathway Therapeutic exercise Gait Training Transfer training Strengthening    Weight Bearing Status: Full Weight Bearing    Frequency: 1 Week 1            Follow-up Information     Ilsa Jacobo NP .    Specialty: Family Medicine  Contact information:  [address] [place] [address], [address]  [place] [id number] [phone]                         Additional Instructions for the Follow-ups that You Need to Schedule     Ambulatory Referral to Home Health ([facility])   As directed      Face to Face Visit Date: 10/17/2022    Follow-up provider for Plan of Care?: I treated the patient in an acute care facility and will not continue treatment after discharge.    Follow-up provider: ILSA JACOBO [[id number]]    Reason/Clinical Findings: weakness    Describe mobility  limitations that make leaving home difficult: Age related physicial debility    Nursing/Therapeutic Services Requested: Physical Therapy    PT orders: Total joint pathway Therapeutic exercise Gait Training Transfer training Strengthening    Weight Bearing Status: Full Weight Bearing    Frequency: 1 Week 1           Time Spent on Discharge:  Greater than 30 minutes      Jerson Bull MD  [facility]  10/17/22  11:09 EDT

## 2022-10-17 NOTE — PROGRESS NOTES
Was not notified of consult request. Patient's name showed up on hospital census list today.  I am unable to see patient today due to other clinic obligations.  Chart reviewed. Therapy notes reviewed.   Patient had two month inpatient rehab stay this past summer.  Over August to October has had five admissions to [facility].  Most recent admission is for hyperkalemia and hyperglycemia, which would not necessitate an acute inpatient rehab stay.  With PT yesterday - [supine-sit with Philip. CGA for sit-stand and she continues to demonstrate ability to ambulate 40' with RWx and CGA/Philip. Patient remains self limiting due to feeling SOB despite not presenting with labored breathing and SpO2 remaining >94%.].  At discharge from inpatient rehab stay in July -  Transfers min assist. Gait 40 feet CTG - min.   I would not see inpatient rehab stay at [facility] indicated at this time and would look at other disposition options.    Adonis Florentino MD

## 2022-10-17 NOTE — PLAN OF CARE
Goal Outcome Evaluation:  Plan of Care Reviewed With: patient           Outcome Evaluation: heart rate low 100's, no co pain or discomfort.  blood sugar remain high, medicated per mar.  conts w fluid restriction.  will cont to monitor

## 2022-10-17 NOTE — SIGNIFICANT NOTE
10/17/22 1617   OTHER   Discipline physical therapist   Rehab Time/Intention   Session Not Performed patient/family declined treatment  (Pt states she is going home today and denies any needs for therapy today.)

## 2022-10-17 NOTE — PLAN OF CARE
Goal Outcome Evaluation:  Plan of Care Reviewed With: patient        Progress: improving  Outcome Evaluation: patient vss. no pain reported. patient feeling better after dialysis. plans to dc home today

## 2022-10-17 NOTE — PLAN OF CARE
"Goal Outcome Evaluation:  Plan of Care Reviewed With: patient           Outcome Evaluation: Pt is a 56 y.o female admitted on 10/12 with weakness, nausea, vomitting, unable to attend HD appointment. She is being treated for hyperkalemia. She has complex PMH not limited to ESRD, stroke, DM. She reports she has been recieving assist from family since stroke in May 2022 at home and has \"good and bad days\". Today pt lethargic but awake as RN was at her bedside assisting with her IV. She requires mod A to peform bed mobility, slow to respond to OT questions but also reporting \"I didnt sleep good last night\". Then reporting \"I dont feel well\" and began to vomitting sitting EOB. RN notifed and in room attending. Limited engagement in mobility and ADL tasks today due to pt not feeling well. She was able to stand up Min A then return to bed mod A. She may benefit from OT to address deficits with strength, endurance, balance to increase her independence with ADLs. OT recommending dc to SNF vs home with HH. Pt reports she has 24/7 assist at home between her spouse and daughter and has all needed DME including a w/c if needed.    OT wore appropriate PPE and completed hand hygiene.   "

## 2022-10-17 NOTE — NURSING NOTE
HD WITHOUT INCIDENT OR C/O. TOLERATED WELL. REMOVED 2 L. NO MEDS ADMINISTERED. DRSG CURRENT, CDI. STABLE, NO C/O UPON COMPLETION OF HD.

## 2022-10-17 NOTE — PROGRESS NOTES
[facility] Progress Note      Patient Name: Zaina Martinez  : 1965  MRN: [id number]  Primary Care Physician:  Jane Kyle NP  Date of admission: 10/12/2022    Subjective     Follow up for ESRD management    Interval History:     She was seen on dialysis.  She is tolerating well  Heart rate is still high.  No chest pain or shortness of breath palpitations    No fevers or chills    Review of Systems:   As noted above    Objective     Vitals:   Temp:  [97.4 °F (36.3 °C)-98.1 °F (36.7 °C)] 98 °F (36.7 °C)  Heart Rate:  [] 113  Resp:  [16-20] 20  BP: (114-134)/(81-99) 132/94  Flow (L/min):  [2] 2    Intake/Output Summary (Last 24 hours) at 10/17/2022 0830  Last data filed at 10/16/2022 1940  Gross per 24 hour   Intake 920 ml   Output --   Net 920 ml       Physical Exam:    Constitutional: Awake, alert, NAD, chronically ill  Qb 400, 145/92, , fluid removal goal 3 L  HEENT: Sclera anicteric, no conjunctival injection  Neck: Supple, no carotid bruit, trachea at midline, no JVD  Resp: No BS in lower bases, few crackles midfields, nonlabored   Vascular: Right chest TDC  Cardiovascular: Irregularly irregular, tachycardic, 2/6M, no rub  Gastrointestinal: BS +, soft, nontender, +distended  : No palpable bladder  Musculoskeletal:  No significant edema, + clubbing  Psychiatric: Flat affect, depressed mood, cooperative, oriented  Neurologic: moving all extremities, normal speech and mental status  Skin: Warm and dry    Scheduled Meds:     apixaban, 5 mg, Oral, Q12H  aspirin, 81 mg, Oral, Daily  carvedilol, 25 mg, Oral, BID With Meals  dilTIAZem CD, 180 mg, Oral, Q24H  folic acid, 1 mg, Oral, Daily  insulin glargine, 18 Units, Subcutaneous, Nightly  insulin lispro, 0-7 Units, Subcutaneous, TID AC  levothyroxine, 250 mcg, Oral, Q AM  melatonin, 3 mg, Oral, Nightly  pantoprazole, 40 mg, Oral, Daily  rOPINIRole, 1 mg, Oral, Nightly  sertraline, 150 mg, Oral, Daily      IV Meds:         Results Reviewed:   I have personally reviewed the results from the time of this admission to 10/17/2022 08:30 EDT     Results from last 7 days   Lab Units 10/15/22  0600 10/14/22  0612 10/13/22  0827 10/12/22  2351 10/12/22  1811   SODIUM mmol/L 125* 128* 130*   < > 129*   POTASSIUM mmol/L 5.0 5.0 4.2   < > 6.7*   CHLORIDE mmol/L 91* 90* 94*   < > 90*   CO2 mmol/L 23.4 25.9 26.0   < > 19.9*   BUN mg/dL 35* 37* 19   < > 44*   CREATININE mg/dL 2.85* 3.42* 2.44*   < > 4.93*   CALCIUM mg/dL 7.7* 7.8* 8.1*   < > 8.5*   BILIRUBIN mg/dL  --   --   --   --  0.4   ALK PHOS U/L  --   --   --   --  300*   ALT (SGPT) U/L  --   --   --   --  26   AST (SGOT) U/L  --   --   --   --  23   GLUCOSE mg/dL 395* 245* 290*   < > 441*    < > = values in this interval not displayed.       Estimated Creatinine Clearance: 20 mL/min (A) (by C-G formula based on SCr of 2.85 mg/dL (H)).    Results from last 7 days   Lab Units 10/15/22  0600 10/12/22  2351   MAGNESIUM mg/dL  --  2.0   PHOSPHORUS mg/dL 2.5  --              Results from last 7 days   Lab Units 10/17/22  0554 10/16/22  0521 10/15/22  0600 10/13/22  0827 10/12/22  1811   WBC 10*3/mm3 7.17 8.42 7.58 8.15 9.16   HEMOGLOBIN g/dL 11.0* 11.3* 11.5* 12.2 13.2   PLATELETS 10*3/mm3 158 167 162 219 234             Assessment / Plan     ASSESSMENT:  1.  ESRD: Status post right internal jugular TDC.  Undergoing hemodialysis on a Monday, Wednesday and Friday schedule.  Optimizing volume status  2.  Anasarca with bilateral pleural effusions, ascites, and anasarca; has chronic small pericardial effusion, with August '22 echo negative for tamponade, optimizing volume status with hemodialysis  3.  Atrial fibrillation/flutter with rapid rate.  Closely monitor on telemetry, as per cardiology management  4.  Hypertension of ESRD, exacerbated by volume overload  5.  History of noncompliance with fluid restriction, potassium restriction, and medications in general  6.  Diabetes Mellitus type 2  .Continue insulin regimen  7.  Anemia of ESRD:  Hgb > 10 . Holding ANDRAE  8.  Recent left-sided hydroureteronephrosis with hematuria, s/p left stent placement on 9/29/22 ,  seen by urology,  w repeat CT abdomen, suggested to continue medical management. No need for urological intervention during this admission   9.  Hypothyroidism, severe:  noncompliant with thyroid medication    PLAN:  -Continue hemodialysis during her admission, on MWF schedule  -Adjust medications to GFR  -Continue carvedilol and diltiazem  -Continue to follow closely  -Continue surveillance labs     I will continue to follow.    Gonzalez Rivera MD  10/17/22  08:30 EDT    [facility]  [phone]

## 2022-10-18 NOTE — OUTREACH NOTE
Prep Survey    Flowsheet Row Responses   [facility] patient discharged from? [facility]   Is LACE score < 7 ? No   Emergency Room discharge w/ pulse ox? No   Eligibility Readm Mgmt   Discharge diagnosis Hyperkalemia, hypothyroidism, atrial flutter with RVR   Does the patient have one of the following disease processes/diagnoses(primary or secondary)? Other   Does the patient have Home health ordered? Yes   What is the Home health agency?  [facility]   Is there a DME ordered? No   Prep survey completed? Yes          ADONAY MAJOR - Registered Nurse

## 2022-10-18 NOTE — CASE MANAGEMENT/SOCIAL WORK
Case Management Discharge Note      Final Note: discharged home with [facility]         Selected Continued Care - Discharged on 10/17/2022 Admission date: 10/12/2022 - Discharge disposition: Home or Self Care    Destination    No services have been selected for the patient.              Durable Medical Equipment    No services have been selected for the patient.              Dialysis/Infusion    No services have been selected for the patient.              Home Medical Care     Service Provider Selected Services Address Phone Fax Patient Preferred    [facility] [address] [address] [phone] --          Therapy    No services have been selected for the patient.              Community Resources    No services have been selected for the patient.              Community & DME    No services have been selected for the patient.                Selected Continued Care - Prior Encounters Includes continued care and service providers with selected services from prior encounters from 7/14/2022 to 10/17/2022    Discharged on 10/6/2022 Admission date: 9/26/2022 - Discharge disposition: Home-Health Care Svc    Durable Medical Equipment     Service Provider Selected Services Address Phone Fax Patient Preferred    [facility] D[facility] Medical Equipment [address] [facility] [address] [phone] --          Home Medical Care     Service Provider Selected Services Address Phone Fax Patient Preferred    [facility] [address] [address] [phone] --                Discharged on 9/13/2022 Admission date: 9/3/2022 - Discharge disposition: Home-Health Care Svc    Durable Medical Equipment     Service Provider Selected Services Address Phone Fax Patient Preferred    [facility] Durable Medical Equipment [address] [facility]  [place] [address] [phone] --          Home Medical Care     Service Provider Selected Services Address Phone Fax Patient Preferred    [facility] [address] [place] [id number] [phone] --                Discharged on 8/27/2022 Admission date: 8/19/2022 - Discharge disposition: Home-Health Care Svc    Durable Medical Equipment     Service Provider Selected Services Address Phone Fax Patient Preferred    [facility] Durable Medical Equipment [address] [facility] [address] [phone] --          Home Medical Care     Service Provider Selected Services Address Phone Fax Patient Preferred    [facility] [address] [address] [phone] --                Discharged on 8/4/2022 Admission date: 7/27/2022 - Discharge disposition: Home-Health Care Svc    Dialysis/Infusion     Service Provider Selected Services Address Phone Fax Patient Preferred    [facility] [address] [address] [phone] --          Home Medical Care     Service Provider Selected Services Address Phone Fax Patient Preferred    [facility] [address] [address] [phone] --                    Transportation Services  Private: Car    Final Discharge Disposition Code: 06 - home with home health care

## 2022-10-19 ENCOUNTER — READMISSION MANAGEMENT (OUTPATIENT)
Dept: CALL CENTER | Facility: HOSPITAL | Age: 57
End: 2022-10-19

## 2022-10-19 NOTE — OUTREACH NOTE
Medical Week 1 Survey    Flowsheet Row Responses   [facility] patient discharged from? [place]   Does the patient have one of the following disease processes/diagnoses(primary or secondary)? Other   Week 1 attempt successful? Yes   Call start time 1339   Call end time 1349   General alerts for this patient HD--M,W, FR   Discharge diagnosis Hyperkalemia, hypothyroidism, atrial flutter with RVR   Person spoke with today (if not patient) and relationship spouse   Meds reviewed with patient/caregiver? Yes   Does the patient have all medications ordered at discharge? Yes   Is the patient taking all medications as directed (includes completed medication regime)? No   What is preventing the patient from taking all medications as directed? Other  [ reports that patient is not getting her insulin right now because he hasn't tackled that yet. He states his daughter was helping with meds, but not anymore and he has to work during the day. ]   Does the patient have a primary care provider?  Yes   Does the patient have an appointment with their PCP within 7 days of discharge? No   Comments regarding PCP PCP Jane Kyle NP   Has the patient kept scheduled appointments due by today? N/A   What is the Home health agency?  [facility]   Has home health visited the patient within 72 hours of discharge? Call prior to 72 hours   Psychosocial issues? Yes   Psychosocial comments  reports that patient needs to go to rehab because he cannot be home to take care of her and now he does not have the previous help of his daughter. He reports that patient needs to go to rehab and needs a  to contact asap.    Notified Case Management Psychosocial (Urgent)   Did the patient receive a copy of their discharge instructions? Yes   Nursing interventions Reviewed instructions with patient   What is the patient's perception of their health status since discharge? Same  [ reports patient very weak. ]   Is the  patient/caregiver able to teach back signs and symptoms related to disease process for when to call PCP? Yes   Is the patient/caregiver able to teach back signs and symptoms related to disease process for when to call 911? Yes   Is the patient/caregiver able to teach back the hierarchy of who to call/visit for symptoms/problems? PCP, Specialist, Home health nurse, Urgent Care, ED, 911 Yes   If the patient is a current smoker, are they able to teach back resources for cessation? Not a smoker   Week 1 call completed? Yes          SHARMILA SHANE - Registered Nurse

## 2022-10-21 NOTE — OUTREACH NOTE
Case Management Call Center Follow-up    Flowsheet Row Responses   [facility] Call Center Tracking Reason? Other (specify in comments)   Other Tracking Comments wants placement   Has the Call Center Case Management Follow-up issue been resolved? Yes   Follow-up Comments I called and talked to CHRISTY HOPKINS, she has been seen by them since February, however they do not see her a lof because she usually goes back to ED before they can see her. The [facility] nurse that is assigned to her is going to call or go over there and discuss options for placement.  She refuled PT the last day she was offered at the hospital which was over 5 days ago, so we would not be able to get her placed at this time.          Shannon Epley, RN    10/21/2022, 10:56 CDT

## 2022-10-26 ENCOUNTER — READMISSION MANAGEMENT (OUTPATIENT)
Dept: CALL CENTER | Facility: HOSPITAL | Age: 57
End: 2022-10-26

## 2022-10-26 ENCOUNTER — HOSPITAL ENCOUNTER (INPATIENT)
Facility: HOSPITAL | Age: 57
LOS: 1 days | Discharge: HOME HEALTH | End: 2022-11-01
Attending: EMERGENCY MEDICINE | Admitting: INTERNAL MEDICINE

## 2022-10-26 ENCOUNTER — APPOINTMENT (OUTPATIENT)
Dept: GENERAL RADIOLOGY | Facility: HOSPITAL | Age: 57
End: 2022-10-26

## 2022-10-26 DIAGNOSIS — E87.79 OTHER HYPERVOLEMIA: Primary | ICD-10-CM

## 2022-10-26 DIAGNOSIS — I50.23 ACUTE ON CHRONIC SYSTOLIC CHF (CONGESTIVE HEART FAILURE): ICD-10-CM

## 2022-10-26 DIAGNOSIS — G47.34 NOCTURNAL HYPOXIA: ICD-10-CM

## 2022-10-26 DIAGNOSIS — N18.6 ESRD (END STAGE RENAL DISEASE) ON DIALYSIS: ICD-10-CM

## 2022-10-26 DIAGNOSIS — J96.01 ACUTE RESPIRATORY FAILURE WITH HYPOXIA: ICD-10-CM

## 2022-10-26 DIAGNOSIS — Z99.2 ESRD (END STAGE RENAL DISEASE) ON DIALYSIS: ICD-10-CM

## 2022-10-26 DIAGNOSIS — J81.0 ACUTE PULMONARY EDEMA: ICD-10-CM

## 2022-10-26 LAB
ALBUMIN SERPL-MCNC: 3.3 G/DL (ref 3.5–5.2)
ALBUMIN/GLOB SERPL: 0.9 G/DL
ALP SERPL-CCNC: 329 U/L (ref 39–117)
ALT SERPL W P-5'-P-CCNC: 23 U/L (ref 1–33)
ANION GAP SERPL CALCULATED.3IONS-SCNC: 11.2 MMOL/L (ref 5–15)
AST SERPL-CCNC: 26 U/L (ref 1–32)
B PARAPERT DNA SPEC QL NAA+PROBE: NOT DETECTED
B PERT DNA SPEC QL NAA+PROBE: NOT DETECTED
BASOPHILS # BLD AUTO: 0.06 10*3/MM3 (ref 0–0.2)
BASOPHILS NFR BLD AUTO: 0.7 % (ref 0–1.5)
BILIRUB SERPL-MCNC: 0.4 MG/DL (ref 0–1.2)
BUN SERPL-MCNC: 32 MG/DL (ref 6–20)
BUN/CREAT SERPL: 9.3 (ref 7–25)
C PNEUM DNA NPH QL NAA+NON-PROBE: NOT DETECTED
CALCIUM SPEC-SCNC: 9 MG/DL (ref 8.6–10.5)
CHLORIDE SERPL-SCNC: 92 MMOL/L (ref 98–107)
CO2 SERPL-SCNC: 26.8 MMOL/L (ref 22–29)
CREAT SERPL-MCNC: 3.44 MG/DL (ref 0.57–1)
DEPRECATED RDW RBC AUTO: 47.1 FL (ref 37–54)
EGFRCR SERPLBLD CKD-EPI 2021: 15 ML/MIN/1.73
EOSINOPHIL # BLD AUTO: 0.09 10*3/MM3 (ref 0–0.4)
EOSINOPHIL NFR BLD AUTO: 1 % (ref 0.3–6.2)
ERYTHROCYTE [DISTWIDTH] IN BLOOD BY AUTOMATED COUNT: 15 % (ref 12.3–15.4)
FLUAV SUBTYP SPEC NAA+PROBE: NOT DETECTED
FLUBV RNA ISLT QL NAA+PROBE: NOT DETECTED
GLOBULIN UR ELPH-MCNC: 3.7 GM/DL
GLUCOSE BLDC GLUCOMTR-MCNC: 161 MG/DL (ref 70–130)
GLUCOSE SERPL-MCNC: 176 MG/DL (ref 65–99)
HADV DNA SPEC NAA+PROBE: NOT DETECTED
HCOV 229E RNA SPEC QL NAA+PROBE: NOT DETECTED
HCOV HKU1 RNA SPEC QL NAA+PROBE: NOT DETECTED
HCOV NL63 RNA SPEC QL NAA+PROBE: NOT DETECTED
HCOV OC43 RNA SPEC QL NAA+PROBE: NOT DETECTED
HCT VFR BLD AUTO: 37.1 % (ref 34–46.6)
HGB BLD-MCNC: 11.8 G/DL (ref 12–15.9)
HMPV RNA NPH QL NAA+NON-PROBE: NOT DETECTED
HPIV1 RNA ISLT QL NAA+PROBE: NOT DETECTED
HPIV2 RNA SPEC QL NAA+PROBE: NOT DETECTED
HPIV3 RNA NPH QL NAA+PROBE: NOT DETECTED
HPIV4 P GENE NPH QL NAA+PROBE: NOT DETECTED
IMM GRANULOCYTES # BLD AUTO: 0.02 10*3/MM3 (ref 0–0.05)
IMM GRANULOCYTES NFR BLD AUTO: 0.2 % (ref 0–0.5)
LYMPHOCYTES # BLD AUTO: 1.42 10*3/MM3 (ref 0.7–3.1)
LYMPHOCYTES NFR BLD AUTO: 15.7 % (ref 19.6–45.3)
M PNEUMO IGG SER IA-ACNC: NOT DETECTED
MAGNESIUM SERPL-MCNC: 1.9 MG/DL (ref 1.6–2.6)
MCH RBC QN AUTO: 27.4 PG (ref 26.6–33)
MCHC RBC AUTO-ENTMCNC: 31.8 G/DL (ref 31.5–35.7)
MCV RBC AUTO: 86.3 FL (ref 79–97)
MONOCYTES # BLD AUTO: 0.57 10*3/MM3 (ref 0.1–0.9)
MONOCYTES NFR BLD AUTO: 6.3 % (ref 5–12)
NEUTROPHILS NFR BLD AUTO: 6.9 10*3/MM3 (ref 1.7–7)
NEUTROPHILS NFR BLD AUTO: 76.1 % (ref 42.7–76)
NRBC BLD AUTO-RTO: 0 /100 WBC (ref 0–0.2)
NT-PROBNP SERPL-MCNC: ABNORMAL PG/ML (ref 0–900)
PLATELET # BLD AUTO: 270 10*3/MM3 (ref 140–450)
PMV BLD AUTO: 11.4 FL (ref 6–12)
POTASSIUM SERPL-SCNC: 5.8 MMOL/L (ref 3.5–5.2)
PROT SERPL-MCNC: 7 G/DL (ref 6–8.5)
QT INTERVAL: 516 MS
RBC # BLD AUTO: 4.3 10*6/MM3 (ref 3.77–5.28)
RHINOVIRUS RNA SPEC NAA+PROBE: DETECTED
RSV RNA NPH QL NAA+NON-PROBE: NOT DETECTED
SARS-COV-2 RNA NPH QL NAA+NON-PROBE: NOT DETECTED
SODIUM SERPL-SCNC: 130 MMOL/L (ref 136–145)
TROPONIN T SERPL-MCNC: 0.18 NG/ML (ref 0–0.03)
TROPONIN T SERPL-MCNC: 0.2 NG/ML (ref 0–0.03)
WBC NRBC COR # BLD: 9.06 10*3/MM3 (ref 3.4–10.8)

## 2022-10-26 PROCEDURE — 71045 X-RAY EXAM CHEST 1 VIEW: CPT

## 2022-10-26 PROCEDURE — 93005 ELECTROCARDIOGRAM TRACING: CPT

## 2022-10-26 PROCEDURE — 36415 COLL VENOUS BLD VENIPUNCTURE: CPT | Performed by: INTERNAL MEDICINE

## 2022-10-26 PROCEDURE — 80053 COMPREHEN METABOLIC PANEL: CPT | Performed by: EMERGENCY MEDICINE

## 2022-10-26 PROCEDURE — 83880 ASSAY OF NATRIURETIC PEPTIDE: CPT | Performed by: EMERGENCY MEDICINE

## 2022-10-26 PROCEDURE — 82962 GLUCOSE BLOOD TEST: CPT

## 2022-10-26 PROCEDURE — 93005 ELECTROCARDIOGRAM TRACING: CPT | Performed by: EMERGENCY MEDICINE

## 2022-10-26 PROCEDURE — 0202U NFCT DS 22 TRGT SARS-COV-2: CPT | Performed by: EMERGENCY MEDICINE

## 2022-10-26 PROCEDURE — 83735 ASSAY OF MAGNESIUM: CPT | Performed by: EMERGENCY MEDICINE

## 2022-10-26 PROCEDURE — 99285 EMERGENCY DEPT VISIT HI MDM: CPT

## 2022-10-26 PROCEDURE — 93010 ELECTROCARDIOGRAM REPORT: CPT | Performed by: STUDENT IN AN ORGANIZED HEALTH CARE EDUCATION/TRAINING PROGRAM

## 2022-10-26 PROCEDURE — 84484 ASSAY OF TROPONIN QUANT: CPT | Performed by: INTERNAL MEDICINE

## 2022-10-26 PROCEDURE — 85025 COMPLETE CBC W/AUTO DIFF WBC: CPT | Performed by: EMERGENCY MEDICINE

## 2022-10-26 PROCEDURE — 84484 ASSAY OF TROPONIN QUANT: CPT | Performed by: EMERGENCY MEDICINE

## 2022-10-26 PROCEDURE — G0378 HOSPITAL OBSERVATION PER HR: HCPCS

## 2022-10-26 RX ORDER — SODIUM CHLORIDE 0.9 % (FLUSH) 0.9 %
10 SYRINGE (ML) INJECTION AS NEEDED
Status: DISCONTINUED | OUTPATIENT
Start: 2022-10-26 | End: 2022-11-01 | Stop reason: HOSPADM

## 2022-10-26 RX ORDER — ALBUMIN (HUMAN) 12.5 G/50ML
12.5 SOLUTION INTRAVENOUS AS NEEDED
Status: ACTIVE | OUTPATIENT
Start: 2022-10-27 | End: 2022-10-27

## 2022-10-26 RX ORDER — NITROGLYCERIN 0.4 MG/1
0.4 TABLET SUBLINGUAL
Status: DISCONTINUED | OUTPATIENT
Start: 2022-10-26 | End: 2022-11-01 | Stop reason: HOSPADM

## 2022-10-26 RX ORDER — ONDANSETRON 2 MG/ML
4 INJECTION INTRAMUSCULAR; INTRAVENOUS EVERY 6 HOURS PRN
Status: DISCONTINUED | OUTPATIENT
Start: 2022-10-26 | End: 2022-11-01 | Stop reason: HOSPADM

## 2022-10-26 RX ORDER — ONDANSETRON 4 MG/1
4 TABLET, FILM COATED ORAL EVERY 6 HOURS PRN
Status: DISCONTINUED | OUTPATIENT
Start: 2022-10-26 | End: 2022-11-01 | Stop reason: HOSPADM

## 2022-10-26 RX ORDER — UREA 10 %
3 LOTION (ML) TOPICAL NIGHTLY PRN
Status: DISCONTINUED | OUTPATIENT
Start: 2022-10-26 | End: 2022-11-01 | Stop reason: HOSPADM

## 2022-10-26 RX ORDER — DEXTROSE MONOHYDRATE 25 G/50ML
25 INJECTION, SOLUTION INTRAVENOUS
Status: DISCONTINUED | OUTPATIENT
Start: 2022-10-26 | End: 2022-11-01 | Stop reason: HOSPADM

## 2022-10-26 RX ORDER — NICOTINE POLACRILEX 4 MG
15 LOZENGE BUCCAL
Status: DISCONTINUED | OUTPATIENT
Start: 2022-10-26 | End: 2022-11-01 | Stop reason: HOSPADM

## 2022-10-26 RX ORDER — INSULIN LISPRO 100 [IU]/ML
0-9 INJECTION, SOLUTION INTRAVENOUS; SUBCUTANEOUS
Status: DISCONTINUED | OUTPATIENT
Start: 2022-10-26 | End: 2022-11-01 | Stop reason: HOSPADM

## 2022-10-26 RX ORDER — ACETAMINOPHEN 325 MG/1
650 TABLET ORAL EVERY 4 HOURS PRN
Status: DISCONTINUED | OUTPATIENT
Start: 2022-10-26 | End: 2022-11-01 | Stop reason: HOSPADM

## 2022-10-26 NOTE — OUTREACH NOTE
Medical Week 2 Survey    Flowsheet Row Responses   [facility] [place] patient discharged from? [place]   Does the patient have one of the following disease processes/diagnoses(primary or secondary)? Other   Week 2 attempt successful? Yes   Call start time 1302   Discharge diagnosis Hyperkalemia, hypothyroidism, atrial flutter with RVR   Call end time 1305   Person spoke with today (if not patient) and relationship spouse   Nursing interventions Notified EMS  [RN advised  to hand up and call 911 r/t low sats]   What is the patient's perception of their health status since discharge? Worsening  [ reports pt has been vomiting & O2 sats have been 82-85% on RA, pt cannot breathe, she's had blood running out of her mouth while sleeping. She was making a gurgling noise the other night while sleeping. Sat 83-84% at time of call. Call was ended.]   Week 2 Call Completed? Yes          MIRLANDE BARRY - Registered Nurse

## 2022-10-27 ENCOUNTER — READMISSION MANAGEMENT (OUTPATIENT)
Dept: CALL CENTER | Facility: HOSPITAL | Age: 57
End: 2022-10-27

## 2022-10-27 PROBLEM — J96.01 ACUTE RESPIRATORY FAILURE WITH HYPOXIA: Status: ACTIVE | Noted: 2022-10-27

## 2022-10-27 LAB
ANION GAP SERPL CALCULATED.3IONS-SCNC: 12.7 MMOL/L (ref 5–15)
BUN SERPL-MCNC: 38 MG/DL (ref 6–20)
BUN/CREAT SERPL: 9.7 (ref 7–25)
CALCIUM SPEC-SCNC: 8.8 MG/DL (ref 8.6–10.5)
CHLORIDE SERPL-SCNC: 90 MMOL/L (ref 98–107)
CO2 SERPL-SCNC: 27.3 MMOL/L (ref 22–29)
CREAT SERPL-MCNC: 3.91 MG/DL (ref 0.57–1)
DEPRECATED RDW RBC AUTO: 43.7 FL (ref 37–54)
EGFRCR SERPLBLD CKD-EPI 2021: 12.9 ML/MIN/1.73
ERYTHROCYTE [DISTWIDTH] IN BLOOD BY AUTOMATED COUNT: 14.3 % (ref 12.3–15.4)
GLUCOSE BLDC GLUCOMTR-MCNC: 193 MG/DL (ref 70–130)
GLUCOSE BLDC GLUCOMTR-MCNC: 244 MG/DL (ref 70–130)
GLUCOSE BLDC GLUCOMTR-MCNC: 278 MG/DL (ref 70–130)
GLUCOSE BLDC GLUCOMTR-MCNC: 87 MG/DL (ref 70–130)
GLUCOSE SERPL-MCNC: 250 MG/DL (ref 65–99)
HBA1C MFR BLD: 10.5 % (ref 4.8–5.6)
HCT VFR BLD AUTO: 35.9 % (ref 34–46.6)
HGB BLD-MCNC: 11.5 G/DL (ref 12–15.9)
MCH RBC QN AUTO: 26.8 PG (ref 26.6–33)
MCHC RBC AUTO-ENTMCNC: 32 G/DL (ref 31.5–35.7)
MCV RBC AUTO: 83.7 FL (ref 79–97)
PLATELET # BLD AUTO: 242 10*3/MM3 (ref 140–450)
PMV BLD AUTO: 11.3 FL (ref 6–12)
POTASSIUM SERPL-SCNC: 6.6 MMOL/L (ref 3.5–5.2)
RBC # BLD AUTO: 4.29 10*6/MM3 (ref 3.77–5.28)
SODIUM SERPL-SCNC: 130 MMOL/L (ref 136–145)
TSH SERPL DL<=0.05 MIU/L-ACNC: 33.7 UIU/ML (ref 0.27–4.2)
WBC NRBC COR # BLD: 9.11 10*3/MM3 (ref 3.4–10.8)

## 2022-10-27 PROCEDURE — A9270 NON-COVERED ITEM OR SERVICE: HCPCS | Performed by: STUDENT IN AN ORGANIZED HEALTH CARE EDUCATION/TRAINING PROGRAM

## 2022-10-27 PROCEDURE — 63710000001 SERTRALINE 50 MG TABLET: Performed by: STUDENT IN AN ORGANIZED HEALTH CARE EDUCATION/TRAINING PROGRAM

## 2022-10-27 PROCEDURE — 25010000002 ONDANSETRON PER 1 MG: Performed by: INTERNAL MEDICINE

## 2022-10-27 PROCEDURE — G0257 UNSCHED DIALYSIS ESRD PT HOS: HCPCS

## 2022-10-27 PROCEDURE — 63710000001 PANTOPRAZOLE 40 MG TABLET DELAYED-RELEASE: Performed by: STUDENT IN AN ORGANIZED HEALTH CARE EDUCATION/TRAINING PROGRAM

## 2022-10-27 PROCEDURE — 63710000001 DILTIAZEM CD 180 MG CAPSULE SUSTAINED-RELEASE 24 HR: Performed by: STUDENT IN AN ORGANIZED HEALTH CARE EDUCATION/TRAINING PROGRAM

## 2022-10-27 PROCEDURE — A9270 NON-COVERED ITEM OR SERVICE: HCPCS | Performed by: INTERNAL MEDICINE

## 2022-10-27 PROCEDURE — G0378 HOSPITAL OBSERVATION PER HR: HCPCS

## 2022-10-27 PROCEDURE — 25010000002 CALCIUM GLUCONATE-NACL 1-0.675 GM/50ML-% SOLUTION: Performed by: STUDENT IN AN ORGANIZED HEALTH CARE EDUCATION/TRAINING PROGRAM

## 2022-10-27 PROCEDURE — 63710000001 INSULIN LISPRO (HUMAN) PER 5 UNITS: Performed by: INTERNAL MEDICINE

## 2022-10-27 PROCEDURE — 63710000001 FOLIC ACID 1 MG TABLET: Performed by: STUDENT IN AN ORGANIZED HEALTH CARE EDUCATION/TRAINING PROGRAM

## 2022-10-27 PROCEDURE — 63710000001 SERTRALINE 100 MG TABLET: Performed by: STUDENT IN AN ORGANIZED HEALTH CARE EDUCATION/TRAINING PROGRAM

## 2022-10-27 PROCEDURE — 83036 HEMOGLOBIN GLYCOSYLATED A1C: CPT | Performed by: INTERNAL MEDICINE

## 2022-10-27 PROCEDURE — 63710000001 ASPIRIN 81 MG TABLET DELAYED-RELEASE: Performed by: STUDENT IN AN ORGANIZED HEALTH CARE EDUCATION/TRAINING PROGRAM

## 2022-10-27 PROCEDURE — 84443 ASSAY THYROID STIM HORMONE: CPT | Performed by: INTERNAL MEDICINE

## 2022-10-27 PROCEDURE — 82962 GLUCOSE BLOOD TEST: CPT

## 2022-10-27 PROCEDURE — 63710000001 APIXABAN 5 MG TABLET: Performed by: STUDENT IN AN ORGANIZED HEALTH CARE EDUCATION/TRAINING PROGRAM

## 2022-10-27 PROCEDURE — 85027 COMPLETE CBC AUTOMATED: CPT | Performed by: INTERNAL MEDICINE

## 2022-10-27 PROCEDURE — 63710000001 MELATONIN 1 MG TABLET: Performed by: STUDENT IN AN ORGANIZED HEALTH CARE EDUCATION/TRAINING PROGRAM

## 2022-10-27 PROCEDURE — 63710000001 CARVEDILOL 25 MG TABLET: Performed by: STUDENT IN AN ORGANIZED HEALTH CARE EDUCATION/TRAINING PROGRAM

## 2022-10-27 PROCEDURE — 25010000002 HEPARIN (PORCINE) PER 1000 UNITS: Performed by: INTERNAL MEDICINE

## 2022-10-27 PROCEDURE — 80048 BASIC METABOLIC PNL TOTAL CA: CPT | Performed by: INTERNAL MEDICINE

## 2022-10-27 PROCEDURE — 63710000001 INSULIN GLARGINE-YFGN 100 UNIT/ML SOLUTION: Performed by: STUDENT IN AN ORGANIZED HEALTH CARE EDUCATION/TRAINING PROGRAM

## 2022-10-27 RX ORDER — CARVEDILOL 25 MG/1
25 TABLET ORAL 2 TIMES DAILY WITH MEALS
Status: DISCONTINUED | OUTPATIENT
Start: 2022-10-27 | End: 2022-11-01 | Stop reason: HOSPADM

## 2022-10-27 RX ORDER — BENZONATATE 100 MG/1
100 CAPSULE ORAL 3 TIMES DAILY PRN
Status: DISCONTINUED | OUTPATIENT
Start: 2022-10-27 | End: 2022-11-01 | Stop reason: HOSPADM

## 2022-10-27 RX ORDER — HEPARIN SODIUM 1000 [USP'U]/ML
4000 INJECTION, SOLUTION INTRAVENOUS; SUBCUTANEOUS AS NEEDED
Status: DISCONTINUED | OUTPATIENT
Start: 2022-10-27 | End: 2022-11-01 | Stop reason: HOSPADM

## 2022-10-27 RX ORDER — PANTOPRAZOLE SODIUM 40 MG/1
40 TABLET, DELAYED RELEASE ORAL DAILY
Status: DISCONTINUED | OUTPATIENT
Start: 2022-10-27 | End: 2022-11-01 | Stop reason: HOSPADM

## 2022-10-27 RX ORDER — POLYETHYLENE GLYCOL 3350 17 G/17G
17 POWDER, FOR SOLUTION ORAL DAILY PRN
Status: DISCONTINUED | OUTPATIENT
Start: 2022-10-27 | End: 2022-11-01 | Stop reason: HOSPADM

## 2022-10-27 RX ORDER — ASPIRIN 81 MG/1
81 TABLET ORAL DAILY
Status: DISCONTINUED | OUTPATIENT
Start: 2022-10-27 | End: 2022-11-01 | Stop reason: HOSPADM

## 2022-10-27 RX ORDER — ALBUMIN (HUMAN) 12.5 G/50ML
12.5 SOLUTION INTRAVENOUS AS NEEDED
Status: DISPENSED | OUTPATIENT
Start: 2022-10-28 | End: 2022-10-28

## 2022-10-27 RX ORDER — FOLIC ACID 1 MG/1
1 TABLET ORAL DAILY
Status: DISCONTINUED | OUTPATIENT
Start: 2022-10-27 | End: 2022-11-01 | Stop reason: HOSPADM

## 2022-10-27 RX ORDER — CALCIUM GLUCONATE 20 MG/ML
2 INJECTION, SOLUTION INTRAVENOUS ONCE
Status: COMPLETED | OUTPATIENT
Start: 2022-10-27 | End: 2022-10-27

## 2022-10-27 RX ORDER — DILTIAZEM HYDROCHLORIDE 180 MG/1
180 CAPSULE, COATED, EXTENDED RELEASE ORAL
Status: DISCONTINUED | OUTPATIENT
Start: 2022-10-27 | End: 2022-11-01 | Stop reason: HOSPADM

## 2022-10-27 RX ORDER — UREA 10 %
3 LOTION (ML) TOPICAL NIGHTLY
Status: DISCONTINUED | OUTPATIENT
Start: 2022-10-27 | End: 2022-11-01 | Stop reason: HOSPADM

## 2022-10-27 RX ORDER — LEVOTHYROXINE SODIUM 0.12 MG/1
250 TABLET ORAL
Status: DISCONTINUED | OUTPATIENT
Start: 2022-10-28 | End: 2022-11-01 | Stop reason: HOSPADM

## 2022-10-27 RX ADMIN — HEPARIN SODIUM 4000 UNITS: 1000 INJECTION INTRAVENOUS; SUBCUTANEOUS at 17:00

## 2022-10-27 RX ADMIN — CARVEDILOL 25 MG: 25 TABLET, FILM COATED ORAL at 17:45

## 2022-10-27 RX ADMIN — DILTIAZEM HYDROCHLORIDE 180 MG: 180 CAPSULE, EXTENDED RELEASE ORAL at 17:46

## 2022-10-27 RX ADMIN — Medication 3 MG: at 22:14

## 2022-10-27 RX ADMIN — FOLIC ACID 1 MG: 1 TABLET ORAL at 17:46

## 2022-10-27 RX ADMIN — APIXABAN 5 MG: 5 TABLET, FILM COATED ORAL at 22:14

## 2022-10-27 RX ADMIN — PANTOPRAZOLE SODIUM 40 MG: 40 TABLET, DELAYED RELEASE ORAL at 17:45

## 2022-10-27 RX ADMIN — ASPIRIN 81 MG: 81 TABLET, COATED ORAL at 17:45

## 2022-10-27 RX ADMIN — SERTRALINE 150 MG: 100 TABLET, FILM COATED ORAL at 17:46

## 2022-10-27 RX ADMIN — INSULIN LISPRO 6 UNITS: 100 INJECTION, SOLUTION INTRAVENOUS; SUBCUTANEOUS at 12:00

## 2022-10-27 RX ADMIN — INSULIN LISPRO 4 UNITS: 100 INJECTION, SOLUTION INTRAVENOUS; SUBCUTANEOUS at 09:41

## 2022-10-27 RX ADMIN — ONDANSETRON 4 MG: 2 INJECTION INTRAMUSCULAR; INTRAVENOUS at 03:36

## 2022-10-27 RX ADMIN — INSULIN GLARGINE-YFGN 10 UNITS: 100 INJECTION, SOLUTION SUBCUTANEOUS at 22:15

## 2022-10-27 RX ADMIN — CALCIUM GLUCONATE 2 G: 20 INJECTION, SOLUTION INTRAVENOUS at 12:00

## 2022-10-27 NOTE — OUTREACH NOTE
Medical Week 3 Survey    Flowsheet Row Responses   [facility] patient discharged from? [place]   Does the patient have one of the following disease processes/diagnoses(primary or secondary)? Other   Week 3 attempt successful? No   Unsuccessful attempts Attempt 1   Revoke Readmitted          JESSE PAINTING - Registered Nurse

## 2022-10-28 LAB
ALBUMIN SERPL-MCNC: 3.1 G/DL (ref 3.5–5.2)
ANION GAP SERPL CALCULATED.3IONS-SCNC: 9.4 MMOL/L (ref 5–15)
BUN SERPL-MCNC: 22 MG/DL (ref 6–20)
BUN/CREAT SERPL: 8.3 (ref 7–25)
CALCIUM SPEC-SCNC: 8.4 MG/DL (ref 8.6–10.5)
CHLORIDE SERPL-SCNC: 94 MMOL/L (ref 98–107)
CO2 SERPL-SCNC: 25.6 MMOL/L (ref 22–29)
CREAT SERPL-MCNC: 2.66 MG/DL (ref 0.57–1)
EGFRCR SERPLBLD CKD-EPI 2021: 20.4 ML/MIN/1.73
GLUCOSE BLDC GLUCOMTR-MCNC: 135 MG/DL (ref 70–130)
GLUCOSE BLDC GLUCOMTR-MCNC: 172 MG/DL (ref 70–130)
GLUCOSE BLDC GLUCOMTR-MCNC: 258 MG/DL (ref 70–130)
GLUCOSE BLDC GLUCOMTR-MCNC: 343 MG/DL (ref 70–130)
GLUCOSE BLDC GLUCOMTR-MCNC: 354 MG/DL (ref 70–130)
GLUCOSE SERPL-MCNC: 385 MG/DL (ref 65–99)
HBV SURFACE AG SERPL QL IA: NORMAL
PHOSPHATE SERPL-MCNC: 2.6 MG/DL (ref 2.5–4.5)
POTASSIUM SERPL-SCNC: 5.3 MMOL/L (ref 3.5–5.2)
SODIUM SERPL-SCNC: 129 MMOL/L (ref 136–145)

## 2022-10-28 PROCEDURE — 25010000002 ALBUMIN HUMAN 25% PER 50 ML: Performed by: INTERNAL MEDICINE

## 2022-10-28 PROCEDURE — P9047 ALBUMIN (HUMAN), 25%, 50ML: HCPCS | Performed by: INTERNAL MEDICINE

## 2022-10-28 PROCEDURE — 82962 GLUCOSE BLOOD TEST: CPT

## 2022-10-28 PROCEDURE — 97530 THERAPEUTIC ACTIVITIES: CPT

## 2022-10-28 PROCEDURE — 97535 SELF CARE MNGMENT TRAINING: CPT

## 2022-10-28 PROCEDURE — 25010000002 HEPARIN (PORCINE) PER 1000 UNITS: Performed by: INTERNAL MEDICINE

## 2022-10-28 PROCEDURE — 63710000001 INSULIN LISPRO (HUMAN) PER 5 UNITS: Performed by: INTERNAL MEDICINE

## 2022-10-28 PROCEDURE — 87340 HEPATITIS B SURFACE AG IA: CPT | Performed by: INTERNAL MEDICINE

## 2022-10-28 PROCEDURE — 80069 RENAL FUNCTION PANEL: CPT | Performed by: STUDENT IN AN ORGANIZED HEALTH CARE EDUCATION/TRAINING PROGRAM

## 2022-10-28 PROCEDURE — 97162 PT EVAL MOD COMPLEX 30 MIN: CPT

## 2022-10-28 PROCEDURE — 97166 OT EVAL MOD COMPLEX 45 MIN: CPT

## 2022-10-28 PROCEDURE — G0378 HOSPITAL OBSERVATION PER HR: HCPCS

## 2022-10-28 RX ADMIN — HEPARIN SODIUM 4000 UNITS: 1000 INJECTION INTRAVENOUS; SUBCUTANEOUS at 14:42

## 2022-10-28 RX ADMIN — INSULIN LISPRO 7 UNITS: 100 INJECTION, SOLUTION INTRAVENOUS; SUBCUTANEOUS at 08:30

## 2022-10-28 RX ADMIN — FOLIC ACID 1 MG: 1 TABLET ORAL at 08:30

## 2022-10-28 RX ADMIN — CARVEDILOL 25 MG: 25 TABLET, FILM COATED ORAL at 17:44

## 2022-10-28 RX ADMIN — PANTOPRAZOLE SODIUM 40 MG: 40 TABLET, DELAYED RELEASE ORAL at 08:30

## 2022-10-28 RX ADMIN — LEVOTHYROXINE SODIUM 250 MCG: 0.12 TABLET ORAL at 05:31

## 2022-10-28 RX ADMIN — CARVEDILOL 25 MG: 25 TABLET, FILM COATED ORAL at 08:29

## 2022-10-28 RX ADMIN — ALBUMIN (HUMAN) 12.5 G: 0.25 INJECTION, SOLUTION INTRAVENOUS at 12:18

## 2022-10-28 RX ADMIN — INSULIN LISPRO 2 UNITS: 100 INJECTION, SOLUTION INTRAVENOUS; SUBCUTANEOUS at 17:44

## 2022-10-28 RX ADMIN — APIXABAN 5 MG: 5 TABLET, FILM COATED ORAL at 08:29

## 2022-10-28 RX ADMIN — ASPIRIN 81 MG: 81 TABLET, COATED ORAL at 08:29

## 2022-10-28 RX ADMIN — DILTIAZEM HYDROCHLORIDE 180 MG: 180 CAPSULE, EXTENDED RELEASE ORAL at 08:29

## 2022-10-28 RX ADMIN — INSULIN GLARGINE-YFGN 10 UNITS: 100 INJECTION, SOLUTION SUBCUTANEOUS at 22:01

## 2022-10-28 RX ADMIN — Medication 3 MG: at 23:20

## 2022-10-28 RX ADMIN — APIXABAN 5 MG: 5 TABLET, FILM COATED ORAL at 20:09

## 2022-10-28 RX ADMIN — SERTRALINE 150 MG: 100 TABLET, FILM COATED ORAL at 08:29

## 2022-10-29 LAB
ALBUMIN SERPL-MCNC: 3.2 G/DL (ref 3.5–5.2)
ANION GAP SERPL CALCULATED.3IONS-SCNC: 10.8 MMOL/L (ref 5–15)
BUN SERPL-MCNC: 20 MG/DL (ref 6–20)
BUN/CREAT SERPL: 8.1 (ref 7–25)
CALCIUM SPEC-SCNC: 8.1 MG/DL (ref 8.6–10.5)
CHLORIDE SERPL-SCNC: 97 MMOL/L (ref 98–107)
CO2 SERPL-SCNC: 21.2 MMOL/L (ref 22–29)
CREAT SERPL-MCNC: 2.47 MG/DL (ref 0.57–1)
EGFRCR SERPLBLD CKD-EPI 2021: 22.2 ML/MIN/1.73
GLUCOSE BLDC GLUCOMTR-MCNC: 246 MG/DL (ref 70–130)
GLUCOSE BLDC GLUCOMTR-MCNC: 252 MG/DL (ref 70–130)
GLUCOSE BLDC GLUCOMTR-MCNC: 59 MG/DL (ref 70–130)
GLUCOSE BLDC GLUCOMTR-MCNC: 73 MG/DL (ref 70–130)
GLUCOSE BLDC GLUCOMTR-MCNC: 80 MG/DL (ref 70–130)
GLUCOSE SERPL-MCNC: 179 MG/DL (ref 65–99)
MAGNESIUM SERPL-MCNC: 1.9 MG/DL (ref 1.6–2.6)
PHOSPHATE SERPL-MCNC: 2.5 MG/DL (ref 2.5–4.5)
POTASSIUM SERPL-SCNC: 5.3 MMOL/L (ref 3.5–5.2)
SODIUM SERPL-SCNC: 129 MMOL/L (ref 136–145)

## 2022-10-29 PROCEDURE — G0378 HOSPITAL OBSERVATION PER HR: HCPCS

## 2022-10-29 PROCEDURE — 63710000001 INSULIN LISPRO (HUMAN) PER 5 UNITS: Performed by: INTERNAL MEDICINE

## 2022-10-29 PROCEDURE — 83735 ASSAY OF MAGNESIUM: CPT | Performed by: INTERNAL MEDICINE

## 2022-10-29 PROCEDURE — 80069 RENAL FUNCTION PANEL: CPT | Performed by: STUDENT IN AN ORGANIZED HEALTH CARE EDUCATION/TRAINING PROGRAM

## 2022-10-29 PROCEDURE — 82962 GLUCOSE BLOOD TEST: CPT

## 2022-10-29 PROCEDURE — 25010000002 ONDANSETRON PER 1 MG: Performed by: INTERNAL MEDICINE

## 2022-10-29 RX ORDER — ROPINIROLE 2 MG/1
2 TABLET, FILM COATED ORAL NIGHTLY
Status: DISCONTINUED | OUTPATIENT
Start: 2022-10-29 | End: 2022-11-01 | Stop reason: HOSPADM

## 2022-10-29 RX ADMIN — Medication 3 MG: at 19:57

## 2022-10-29 RX ADMIN — PANTOPRAZOLE SODIUM 40 MG: 40 TABLET, DELAYED RELEASE ORAL at 08:00

## 2022-10-29 RX ADMIN — FOLIC ACID 1 MG: 1 TABLET ORAL at 08:00

## 2022-10-29 RX ADMIN — INSULIN GLARGINE-YFGN 10 UNITS: 100 INJECTION, SOLUTION SUBCUTANEOUS at 21:59

## 2022-10-29 RX ADMIN — APIXABAN 5 MG: 5 TABLET, FILM COATED ORAL at 08:00

## 2022-10-29 RX ADMIN — DILTIAZEM HYDROCHLORIDE 180 MG: 180 CAPSULE, EXTENDED RELEASE ORAL at 08:00

## 2022-10-29 RX ADMIN — LEVOTHYROXINE SODIUM 250 MCG: 0.12 TABLET ORAL at 06:39

## 2022-10-29 RX ADMIN — ROPINIROLE 2 MG: 2 TABLET, FILM COATED ORAL at 19:57

## 2022-10-29 RX ADMIN — ASPIRIN 81 MG: 81 TABLET, COATED ORAL at 08:00

## 2022-10-29 RX ADMIN — CARVEDILOL 25 MG: 25 TABLET, FILM COATED ORAL at 17:54

## 2022-10-29 RX ADMIN — DEXTROSE 15 G: 15 GEL ORAL at 11:33

## 2022-10-29 RX ADMIN — APIXABAN 5 MG: 5 TABLET, FILM COATED ORAL at 19:57

## 2022-10-29 RX ADMIN — INSULIN LISPRO 4 UNITS: 100 INJECTION, SOLUTION INTRAVENOUS; SUBCUTANEOUS at 07:56

## 2022-10-29 RX ADMIN — ACETAMINOPHEN 650 MG: 325 TABLET, FILM COATED ORAL at 02:33

## 2022-10-29 RX ADMIN — CARVEDILOL 25 MG: 25 TABLET, FILM COATED ORAL at 07:57

## 2022-10-29 RX ADMIN — ONDANSETRON 4 MG: 2 INJECTION INTRAMUSCULAR; INTRAVENOUS at 10:31

## 2022-10-29 RX ADMIN — SERTRALINE 150 MG: 100 TABLET, FILM COATED ORAL at 08:00

## 2022-10-30 LAB
ALBUMIN SERPL-MCNC: 2.9 G/DL (ref 3.5–5.2)
ANION GAP SERPL CALCULATED.3IONS-SCNC: 9.7 MMOL/L (ref 5–15)
BUN SERPL-MCNC: 27 MG/DL (ref 6–20)
BUN/CREAT SERPL: 9.3 (ref 7–25)
CALCIUM SPEC-SCNC: 8 MG/DL (ref 8.6–10.5)
CHLORIDE SERPL-SCNC: 96 MMOL/L (ref 98–107)
CO2 SERPL-SCNC: 24.3 MMOL/L (ref 22–29)
CREAT SERPL-MCNC: 2.91 MG/DL (ref 0.57–1)
DEPRECATED RDW RBC AUTO: 46.6 FL (ref 37–54)
EGFRCR SERPLBLD CKD-EPI 2021: 18.3 ML/MIN/1.73
ERYTHROCYTE [DISTWIDTH] IN BLOOD BY AUTOMATED COUNT: 14.6 % (ref 12.3–15.4)
GLUCOSE BLDC GLUCOMTR-MCNC: 220 MG/DL (ref 70–130)
GLUCOSE BLDC GLUCOMTR-MCNC: 299 MG/DL (ref 70–130)
GLUCOSE BLDC GLUCOMTR-MCNC: 321 MG/DL (ref 70–130)
GLUCOSE BLDC GLUCOMTR-MCNC: 95 MG/DL (ref 70–130)
GLUCOSE SERPL-MCNC: 288 MG/DL (ref 65–99)
HCT VFR BLD AUTO: 33.4 % (ref 34–46.6)
HGB BLD-MCNC: 10.5 G/DL (ref 12–15.9)
MCH RBC QN AUTO: 27.4 PG (ref 26.6–33)
MCHC RBC AUTO-ENTMCNC: 31.4 G/DL (ref 31.5–35.7)
MCV RBC AUTO: 87.2 FL (ref 79–97)
PHOSPHATE SERPL-MCNC: 3.4 MG/DL (ref 2.5–4.5)
PLATELET # BLD AUTO: 160 10*3/MM3 (ref 140–450)
PMV BLD AUTO: 10.5 FL (ref 6–12)
POTASSIUM SERPL-SCNC: 5.5 MMOL/L (ref 3.5–5.2)
RBC # BLD AUTO: 3.83 10*6/MM3 (ref 3.77–5.28)
SODIUM SERPL-SCNC: 130 MMOL/L (ref 136–145)
WBC NRBC COR # BLD: 9.67 10*3/MM3 (ref 3.4–10.8)

## 2022-10-30 PROCEDURE — 25010000002 HEPARIN (PORCINE) PER 1000 UNITS: Performed by: INTERNAL MEDICINE

## 2022-10-30 PROCEDURE — 85027 COMPLETE CBC AUTOMATED: CPT | Performed by: INTERNAL MEDICINE

## 2022-10-30 PROCEDURE — 63710000001 INSULIN LISPRO (HUMAN) PER 5 UNITS: Performed by: INTERNAL MEDICINE

## 2022-10-30 PROCEDURE — 82962 GLUCOSE BLOOD TEST: CPT

## 2022-10-30 PROCEDURE — G0378 HOSPITAL OBSERVATION PER HR: HCPCS

## 2022-10-30 PROCEDURE — 97530 THERAPEUTIC ACTIVITIES: CPT

## 2022-10-30 PROCEDURE — 80069 RENAL FUNCTION PANEL: CPT | Performed by: STUDENT IN AN ORGANIZED HEALTH CARE EDUCATION/TRAINING PROGRAM

## 2022-10-30 RX ADMIN — Medication 3 MG: at 21:45

## 2022-10-30 RX ADMIN — CARVEDILOL 25 MG: 25 TABLET, FILM COATED ORAL at 08:37

## 2022-10-30 RX ADMIN — INSULIN GLARGINE-YFGN 10 UNITS: 100 INJECTION, SOLUTION SUBCUTANEOUS at 21:44

## 2022-10-30 RX ADMIN — APIXABAN 5 MG: 5 TABLET, FILM COATED ORAL at 21:46

## 2022-10-30 RX ADMIN — DILTIAZEM HYDROCHLORIDE 180 MG: 180 CAPSULE, EXTENDED RELEASE ORAL at 08:36

## 2022-10-30 RX ADMIN — FOLIC ACID 1 MG: 1 TABLET ORAL at 08:37

## 2022-10-30 RX ADMIN — ROPINIROLE 2 MG: 2 TABLET, FILM COATED ORAL at 21:46

## 2022-10-30 RX ADMIN — INSULIN LISPRO 7 UNITS: 100 INJECTION, SOLUTION INTRAVENOUS; SUBCUTANEOUS at 12:39

## 2022-10-30 RX ADMIN — INSULIN LISPRO 6 UNITS: 100 INJECTION, SOLUTION INTRAVENOUS; SUBCUTANEOUS at 08:37

## 2022-10-30 RX ADMIN — PANTOPRAZOLE SODIUM 40 MG: 40 TABLET, DELAYED RELEASE ORAL at 08:37

## 2022-10-30 RX ADMIN — LEVOTHYROXINE SODIUM 250 MCG: 0.12 TABLET ORAL at 06:25

## 2022-10-30 RX ADMIN — APIXABAN 5 MG: 5 TABLET, FILM COATED ORAL at 08:37

## 2022-10-30 RX ADMIN — ASPIRIN 81 MG: 81 TABLET, COATED ORAL at 08:37

## 2022-10-30 RX ADMIN — HEPARIN SODIUM 4000 UNITS: 1000 INJECTION INTRAVENOUS; SUBCUTANEOUS at 19:46

## 2022-10-30 RX ADMIN — SERTRALINE 150 MG: 100 TABLET, FILM COATED ORAL at 08:37

## 2022-10-31 PROBLEM — E87.79 OTHER HYPERVOLEMIA: Status: ACTIVE | Noted: 2022-01-01

## 2022-10-31 LAB
ALBUMIN SERPL-MCNC: 2.9 G/DL (ref 3.5–5.2)
ANION GAP SERPL CALCULATED.3IONS-SCNC: 6.6 MMOL/L (ref 5–15)
BUN SERPL-MCNC: 14 MG/DL (ref 6–20)
BUN/CREAT SERPL: 6.7 (ref 7–25)
CALCIUM SPEC-SCNC: 8.2 MG/DL (ref 8.6–10.5)
CHLORIDE SERPL-SCNC: 99 MMOL/L (ref 98–107)
CO2 SERPL-SCNC: 25.4 MMOL/L (ref 22–29)
CREAT SERPL-MCNC: 2.08 MG/DL (ref 0.57–1)
DEPRECATED RDW RBC AUTO: 45.8 FL (ref 37–54)
EGFRCR SERPLBLD CKD-EPI 2021: 27.3 ML/MIN/1.73
ERYTHROCYTE [DISTWIDTH] IN BLOOD BY AUTOMATED COUNT: 14.8 % (ref 12.3–15.4)
GLUCOSE BLDC GLUCOMTR-MCNC: 109 MG/DL (ref 70–130)
GLUCOSE BLDC GLUCOMTR-MCNC: 162 MG/DL (ref 70–130)
GLUCOSE BLDC GLUCOMTR-MCNC: 164 MG/DL (ref 70–130)
GLUCOSE BLDC GLUCOMTR-MCNC: 215 MG/DL (ref 70–130)
GLUCOSE SERPL-MCNC: 181 MG/DL (ref 65–99)
HCT VFR BLD AUTO: 35.2 % (ref 34–46.6)
HGB BLD-MCNC: 11.2 G/DL (ref 12–15.9)
MCH RBC QN AUTO: 27.2 PG (ref 26.6–33)
MCHC RBC AUTO-ENTMCNC: 31.8 G/DL (ref 31.5–35.7)
MCV RBC AUTO: 85.4 FL (ref 79–97)
PHOSPHATE SERPL-MCNC: 2.7 MG/DL (ref 2.5–4.5)
PLATELET # BLD AUTO: 165 10*3/MM3 (ref 140–450)
PMV BLD AUTO: 10.6 FL (ref 6–12)
POTASSIUM SERPL-SCNC: 4.2 MMOL/L (ref 3.5–5.2)
RBC # BLD AUTO: 4.12 10*6/MM3 (ref 3.77–5.28)
SODIUM SERPL-SCNC: 131 MMOL/L (ref 136–145)
WBC NRBC COR # BLD: 8.11 10*3/MM3 (ref 3.4–10.8)

## 2022-10-31 PROCEDURE — 85027 COMPLETE CBC AUTOMATED: CPT | Performed by: INTERNAL MEDICINE

## 2022-10-31 PROCEDURE — 82962 GLUCOSE BLOOD TEST: CPT

## 2022-10-31 PROCEDURE — 94762 N-INVAS EAR/PLS OXIMTRY CONT: CPT

## 2022-10-31 PROCEDURE — 25010000002 ONDANSETRON PER 1 MG: Performed by: INTERNAL MEDICINE

## 2022-10-31 PROCEDURE — 63710000001 INSULIN LISPRO (HUMAN) PER 5 UNITS: Performed by: INTERNAL MEDICINE

## 2022-10-31 PROCEDURE — 80069 RENAL FUNCTION PANEL: CPT | Performed by: STUDENT IN AN ORGANIZED HEALTH CARE EDUCATION/TRAINING PROGRAM

## 2022-10-31 RX ADMIN — ROPINIROLE 2 MG: 2 TABLET, FILM COATED ORAL at 21:04

## 2022-10-31 RX ADMIN — LEVOTHYROXINE SODIUM 250 MCG: 0.12 TABLET ORAL at 06:27

## 2022-10-31 RX ADMIN — INSULIN LISPRO 4 UNITS: 100 INJECTION, SOLUTION INTRAVENOUS; SUBCUTANEOUS at 19:12

## 2022-10-31 RX ADMIN — ACETAMINOPHEN 650 MG: 325 TABLET, FILM COATED ORAL at 22:08

## 2022-10-31 RX ADMIN — CARVEDILOL 25 MG: 25 TABLET, FILM COATED ORAL at 19:12

## 2022-10-31 RX ADMIN — PANTOPRAZOLE SODIUM 40 MG: 40 TABLET, DELAYED RELEASE ORAL at 08:27

## 2022-10-31 RX ADMIN — DILTIAZEM HYDROCHLORIDE 180 MG: 180 CAPSULE, EXTENDED RELEASE ORAL at 08:27

## 2022-10-31 RX ADMIN — INSULIN GLARGINE-YFGN 10 UNITS: 100 INJECTION, SOLUTION SUBCUTANEOUS at 22:08

## 2022-10-31 RX ADMIN — ONDANSETRON 4 MG: 2 INJECTION INTRAMUSCULAR; INTRAVENOUS at 06:36

## 2022-10-31 RX ADMIN — FOLIC ACID 1 MG: 1 TABLET ORAL at 08:27

## 2022-10-31 RX ADMIN — INSULIN LISPRO 2 UNITS: 100 INJECTION, SOLUTION INTRAVENOUS; SUBCUTANEOUS at 08:27

## 2022-10-31 RX ADMIN — CARVEDILOL 25 MG: 25 TABLET, FILM COATED ORAL at 08:27

## 2022-10-31 RX ADMIN — ASPIRIN 81 MG: 81 TABLET, COATED ORAL at 08:27

## 2022-10-31 RX ADMIN — SERTRALINE 150 MG: 100 TABLET, FILM COATED ORAL at 08:27

## 2022-10-31 RX ADMIN — APIXABAN 5 MG: 5 TABLET, FILM COATED ORAL at 21:04

## 2022-10-31 RX ADMIN — Medication 3 MG: at 21:04

## 2022-10-31 RX ADMIN — APIXABAN 5 MG: 5 TABLET, FILM COATED ORAL at 08:27

## 2022-11-01 ENCOUNTER — READMISSION MANAGEMENT (OUTPATIENT)
Dept: CALL CENTER | Facility: HOSPITAL | Age: 57
End: 2022-11-01

## 2022-11-01 VITALS
SYSTOLIC BLOOD PRESSURE: 152 MMHG | TEMPERATURE: 96.7 F | OXYGEN SATURATION: 95 % | HEIGHT: 62 IN | HEART RATE: 72 BPM | WEIGHT: 119.27 LBS | RESPIRATION RATE: 16 BRPM | DIASTOLIC BLOOD PRESSURE: 77 MMHG | BODY MASS INDEX: 21.95 KG/M2

## 2022-11-01 LAB
ALBUMIN SERPL-MCNC: 2.9 G/DL (ref 3.5–5.2)
ANION GAP SERPL CALCULATED.3IONS-SCNC: 12 MMOL/L (ref 5–15)
BUN SERPL-MCNC: 32 MG/DL (ref 6–20)
BUN/CREAT SERPL: 11.1 (ref 7–25)
CALCIUM SPEC-SCNC: 8 MG/DL (ref 8.6–10.5)
CHLORIDE SERPL-SCNC: 95 MMOL/L (ref 98–107)
CO2 SERPL-SCNC: 23 MMOL/L (ref 22–29)
CREAT SERPL-MCNC: 2.87 MG/DL (ref 0.57–1)
EGFRCR SERPLBLD CKD-EPI 2021: 18.6 ML/MIN/1.73
GLUCOSE BLDC GLUCOMTR-MCNC: 214 MG/DL (ref 70–130)
GLUCOSE BLDC GLUCOMTR-MCNC: 332 MG/DL (ref 70–130)
GLUCOSE SERPL-MCNC: 235 MG/DL (ref 65–99)
MAGNESIUM SERPL-MCNC: 1.7 MG/DL (ref 1.6–2.6)
PHOSPHATE SERPL-MCNC: 2.9 MG/DL (ref 2.5–4.5)
POTASSIUM SERPL-SCNC: 5.5 MMOL/L (ref 3.5–5.2)
SODIUM SERPL-SCNC: 130 MMOL/L (ref 136–145)

## 2022-11-01 PROCEDURE — 80069 RENAL FUNCTION PANEL: CPT | Performed by: STUDENT IN AN ORGANIZED HEALTH CARE EDUCATION/TRAINING PROGRAM

## 2022-11-01 PROCEDURE — 63710000001 INSULIN LISPRO (HUMAN) PER 5 UNITS: Performed by: INTERNAL MEDICINE

## 2022-11-01 PROCEDURE — 97530 THERAPEUTIC ACTIVITIES: CPT

## 2022-11-01 PROCEDURE — 83735 ASSAY OF MAGNESIUM: CPT | Performed by: INTERNAL MEDICINE

## 2022-11-01 PROCEDURE — 82962 GLUCOSE BLOOD TEST: CPT

## 2022-11-01 RX ORDER — IBUPROFEN 600 MG/1
TABLET ORAL
Qty: 1 EACH | Refills: 0 | Status: SHIPPED | OUTPATIENT
Start: 2022-11-01 | End: 2022-11-24 | Stop reason: HOSPADM

## 2022-11-01 RX ADMIN — CARVEDILOL 25 MG: 25 TABLET, FILM COATED ORAL at 09:18

## 2022-11-01 RX ADMIN — SODIUM ZIRCONIUM CYCLOSILICATE 10 G: 10 POWDER, FOR SUSPENSION ORAL at 10:43

## 2022-11-01 RX ADMIN — INSULIN LISPRO 7 UNITS: 100 INJECTION, SOLUTION INTRAVENOUS; SUBCUTANEOUS at 13:22

## 2022-11-01 RX ADMIN — ASPIRIN 81 MG: 81 TABLET, COATED ORAL at 09:17

## 2022-11-01 RX ADMIN — DILTIAZEM HYDROCHLORIDE 180 MG: 180 CAPSULE, EXTENDED RELEASE ORAL at 09:17

## 2022-11-01 RX ADMIN — LEVOTHYROXINE SODIUM 250 MCG: 0.12 TABLET ORAL at 06:37

## 2022-11-01 RX ADMIN — SERTRALINE 150 MG: 100 TABLET, FILM COATED ORAL at 09:17

## 2022-11-01 RX ADMIN — Medication 10 ML: at 09:18

## 2022-11-01 RX ADMIN — FOLIC ACID 1 MG: 1 TABLET ORAL at 09:17

## 2022-11-01 RX ADMIN — PANTOPRAZOLE SODIUM 40 MG: 40 TABLET, DELAYED RELEASE ORAL at 09:17

## 2022-11-01 RX ADMIN — APIXABAN 5 MG: 5 TABLET, FILM COATED ORAL at 09:18

## 2022-11-01 RX ADMIN — INSULIN LISPRO 4 UNITS: 100 INJECTION, SOLUTION INTRAVENOUS; SUBCUTANEOUS at 09:17

## 2022-11-01 NOTE — NURSING NOTE
Pt alert on 2L nasal cannula denies pain no distress noted transferred to [facility] room 696 gave Lisa REYES a report

## 2022-11-01 NOTE — CASE MANAGEMENT/SOCIAL WORK
Continued Stay Note  [facility]     Patient Name: Zaina Martinez  MRN: [id number]  Today's Date: 11/1/2022    Admit Date: 10/26/2022    Plan: Home with spouse and HH ([facility] referral pending)   Discharge Plan     Row Name 11/01/22 1059       Plan    Plan Comments CCP spoke with patient regarding home nocturnal oxygen ordered per MD. Patient agreeable to [facility]. CCP left VM to Georgi/Uriel. Norm JUNG RN CCP                                          Discharge Codes    No documentation.               Expected Discharge Date and Time     Expected Discharge Date Expected Discharge Time    Nov 1, 2022             Norm Pino RN

## 2022-11-01 NOTE — PLAN OF CARE
Goal Outcome Evaluation:  Plan of Care Reviewed With: patient        Progress: no change  Outcome Evaluation: Pt was a transfer from [address] [place] [address], A/O, VSS, blood sugar monitoring, daily weight, daily fluid restriction of 1000 mL, droplet precautions maintained, purewick in place, low urine output/dialysis pt, Tylenol given for c/o pain

## 2022-11-01 NOTE — DISCHARGE SUMMARY
Patient Name: Zaina Martinez  : 1965  MRN: [id number]    Date of Admission: 10/26/2022  Date of Discharge:  2022  Primary Care Physician: Jane Kyle NP      Chief Complaint:   Shortness of Breath      Discharge Diagnoses     Active Hospital Problems    Diagnosis  POA   • **Other hypervolemia [E87.79]  Yes   • Acute respiratory failure with hypoxia (HCC) [J96.01]  Yes   • Hyperkalemia [E87.5]  Yes   • Atrial flutter (HCC) [I48.92]  Yes   • Acute pulmonary edema (HCC) [J81.0]  Yes   • Hyponatremia [E87.1]  Yes   • CAD (coronary artery disease) [I25.10]  Yes   • ESRD (end stage renal disease) (HCC) [N18.6]  Yes   • Noncompliance with treatment [Z91.199]  Not Applicable   • Type 2 diabetes mellitus with kidney complication, with long-term current use of insulin (HCC) [E11.29, Z79.4]  Not Applicable      Resolved Hospital Problems   No resolved problems to display.        Hospital Course     Ms. Martinez is a 57 y.o. female with a history of end-stage renal disease, type 2 diabetes coronary artery disease and atrial flutter as well as medical noncompliance who presented to [facility] initially complaining of shortness of breath.  Please see the admitting history and physical for further details.  She was found to have and was admitted to the hospital for further evaluation and treatment.  She frequently misses dialysis and is multiple admissions to our facility to our group for volume overload.  On this admission she was volume overloaded but also found to have rhinovirus infection.  She was dialyzed aggressively and her oxygen status improved however she was noted to have continued nocturnal hypoxia.  Her electrolytes stabilized and from a renal standpoint she was cleared for discharge.  She was off oxygen during the day.  I checked an overnight pulse oximetry which qualifies for nocturnal oxygen.  She will need outpatient sleep study for further assessment of this but this oxygen at night  should help bridge that gap.  Otherwise she currently has no other infectious symptoms and wants to go home.  She was seen by physical therapy.  Recommendations were made to move to a skilled nursing facility setting for the next few weeks but patient declines that at this time.  Plan was discussed with the patient at bedside all questions addressed and answered      Day of Discharge     Subjective:  She is feeling better and denies any new home today.    Physical Exam:  Temp:  [97 °F (36.1 °C)-98.3 °F (36.8 °C)] 97 °F (36.1 °C)  Heart Rate:  [61-78] 67  Resp:  [16-18] 16  BP: (139-159)/(71-81) 147/77  Body mass index is 21.81 kg/m².  Physical Exam  Vitals and nursing note reviewed.   Constitutional:       Appearance: Normal appearance.   Pulmonary:      Effort: Pulmonary effort is normal. No respiratory distress.   Neurological:      General: No focal deficit present.      Mental Status: She is alert. Mental status is at baseline.         Consultants     Consult Orders (all) (From admission, onward)     Start     Ordered    10/27/22 1750  Inpatient Rehab Admission Consult  Once        Provider:  (Not yet assigned)    10/27/22 1749    10/27/22 0450  Inpatient Nutrition Consult  Once        Provider:  (Not yet assigned)    10/27/22 0450    10/26/22 2240  Inpatient Case Management  Consult  Once        Provider:  (Not yet assigned)    10/26/22 2240    10/26/22 2233  Inpatient Diabetes Educator Consult  Once        Provider:  (Not yet assigned)    10/26/22 2233    10/26/22 1742  Nephrology (on -call MD unless specified)  Once        Specialty:  Nephrology  Provider:  Alejo Lange MD    10/26/22 1741    10/26/22 1741  [facility] (on-call MD unless specified) Details  Once        Specialty:  Hospitalist  Provider:  Starla Boston MD    10/26/22 1741              Procedures     * Surgery not found *      Imaging Results (All)     Procedure Component Value Units Date/Time    XR Chest AP  [[id number]] Collected: 10/26/22 1600     Updated: 10/26/22 1603    Narrative:      PORTABLE CHEST X-RAY     HISTORY: Cough and fever. COVID evaluation.     TECHNIQUE: Portable chest x-rays correlated chest x-ray October 12, 2022.     FINDINGS: There is a dual lumen right IJ dialysis catheter with its tip  in the right atrium. Cardiac silhouette is mildly enlarged. There is  pulmonary vascular engorgement with small to moderate volume bilateral  pleural effusions and pulmonary interstitial edema. No pneumothorax.       Impression:      Volume overload with interstitial edema and bilateral  pleural effusions.     This report was finalized on 10/26/2022 4:00 PM by Dr. Adonis Burch M.D.           Results for orders placed during the hospital encounter of 05/13/22    Duplex Venous Lower Extremity - Left CAR    Interpretation Summary  · Normal left lower extremity venous duplex scan. Venous pulsatility is noted.    Results for orders placed during the hospital encounter of 08/19/22    Adult Transesophageal Echo (ANJEL) W/ Cont if Necessary Per Protocol    Interpretation Summary  · There is a dialysis catheter which extends into the right atrial cavity. There is a large and highly mobile echodensity attached to the distal portion of the dialysis catheter. The echodensity measures 1.4 cm x 0.86 cm, and is most consistent with a thrombus  · Estimated left ventricular EF = 55% Left ventricular systolic function is normal.  · Left ventricular wall thickness is consistent with mild concentric hypertrophy.  · Normal right ventricular cavity size and systolic function noted.  · Doppler interrogation shows severely reduced flow within the left atrial appendage. No evidence of a left atrial appendage thrombus was present.  · There is a small PFO noted on the agitated saline study  · Moderate mitral valve regurgitation is present  · Moderate tricuspid valve regurgitation is present.  · Calculated right ventricular systolic  pressure from tricuspid regurgitation is 42 mmHg.  · There is a small focal area of severe plaque in the descending thoracic aorta. No significant plaques were seen in the aortic arch  · There is a small circumferential pericardial effusion without evidence of tamponade. There is a small left pleural effusion    Pertinent Labs     Results from last 7 days   Lab Units 10/31/22  0611 10/30/22  0622 10/27/22  0832 10/26/22  1550   WBC 10*3/mm3 8.11 9.67 9.11 9.06   HEMOGLOBIN g/dL 11.2* 10.5* 11.5* 11.8*   PLATELETS 10*3/mm3 165 160 242 270     Results from last 7 days   Lab Units 11/01/22  0624 10/31/22  0611 10/30/22  0622 10/29/22  1555   SODIUM mmol/L 130* 131* 130* 129*   POTASSIUM mmol/L 5.5* 4.2 5.5* 5.3*   CHLORIDE mmol/L 95* 99 96* 97*   CO2 mmol/L 23.0 25.4 24.3 21.2*   BUN mg/dL 32* 14 27* 20   CREATININE mg/dL 2.87* 2.08* 2.91* 2.47*   GLUCOSE mg/dL 235* 181* 288* 179*   EGFR mL/min/1.73 18.6* 27.3* 18.3* 22.2*     Results from last 7 days   Lab Units 11/01/22  0624 10/31/22  0611 10/30/22  0622 10/29/22  1555 10/28/22  0622 10/26/22  1653   ALBUMIN g/dL 2.90* 2.90* 2.90* 3.20*   < > 3.30*   BILIRUBIN mg/dL  --   --   --   --   --  0.4   ALK PHOS U/L  --   --   --   --   --  329*   AST (SGOT) U/L  --   --   --   --   --  26   ALT (SGPT) U/L  --   --   --   --   --  23    < > = values in this interval not displayed.     Results from last 7 days   Lab Units 11/01/22  0624 10/31/22  0611 10/30/22  0622 10/29/22  1555 10/26/22  1653 10/26/22  1550   CALCIUM mg/dL 8.0* 8.2* 8.0* 8.1*   < >  --    ALBUMIN g/dL 2.90* 2.90* 2.90* 3.20*   < >  --    MAGNESIUM mg/dL 1.7  --   --  1.9  --  1.9   PHOSPHORUS mg/dL 2.9 2.7 3.4 2.5   < >  --     < > = values in this interval not displayed.       Results from last 7 days   Lab Units 10/26/22  2151 10/26/22  1653 10/26/22  1550   TROPONIN T ng/mL 0.177* 0.195*  --    PROBNP pg/mL  --   --  >70,000.0*           Invalid input(s): LDLCALC      Results from last 7 days   Lab  Units 10/26/22  1628   COVID19  Not Detected       Test Results Pending at Discharge       Discharge Details        Discharge Medications      New Medications      Instructions Start Date   Glucagon Emergency 1 MG kit   Use as directed for emergently low blood glucose level. Formulary Compliance Approval         Changes to Medications      Instructions Start Date   miconazole 2 % cream  Commonly known as: MICOTIN  What changed: additional instructions   1 application, Topical, Every 12 Hours Scheduled         Continue These Medications      Instructions Start Date   aspirin 81 MG EC tablet   81 mg, Oral, Daily      carvedilol 25 MG tablet  Commonly known as: COREG   25 mg, Oral, 2 Times Daily With Meals      dilTIAZem  MG 24 hr capsule  Commonly known as: CARDIZEM CD   180 mg, Oral, Every 24 Hours Scheduled      Eliquis 5 MG tablet tablet  Generic drug: apixaban   5 mg, Oral, Every 12 Hours Scheduled      folic acid 1 MG tablet  Commonly known as: FOLVITE   1 mg, Oral, Daily      insulin glargine 100 UNIT/ML injection  Commonly known as: LANTUS, SEMGLEE   15 Units, Subcutaneous, Nightly      insulin lispro 100 UNIT/ML injection  Commonly known as: ADMELOG   0-14 Units, Subcutaneous, 3 Times Daily Before Meals      levothyroxine 125 MCG tablet  Commonly known as: SYNTHROID, LEVOTHROID   250 mcg, Oral, Every Early Morning      melatonin 3 MG tablet   3 mg, Oral, Nightly      pantoprazole 40 MG EC tablet  Commonly known as: PROTONIX   40 mg, Oral, Daily      polyethylene glycol 17 g packet  Commonly known as: MIRALAX   17 g, Oral, Daily PRN      rOPINIRole 1 MG tablet  Commonly known as: REQUIP   1 mg, Oral, Nightly, Take 1 hour before bedtime.      sertraline 50 MG tablet  Commonly known as: ZOLOFT   150 mg, Oral, Daily             Allergies   Allergen Reactions   • Contrast Dye Confusion   • Ibuprofen Other (See Comments)     Kidney disease   • Prochlorperazine Other (See Comments)     Dystonic reaction             Discharge Disposition:  Home-Health Care [facility]      Discharge Diet:  Diet Order   Procedures   • Diet Regular; Consistent Carbohydrate, Renal, Daily Fluid Restriction; 1000 mL Fluid Per Day       Discharge Activity:   Activity Instructions     Activity as Tolerated            CODE STATUS:    Code Status and Medical Interventions:   Ordered at: 10/26/22 1804     Code Status (Patient has no pulse and is not breathing):    CPR (Attempt to Resuscitate)     Medical Interventions (Patient has pulse or is breathing):    Full       No future appointments.  Additional Instructions for the Follow-ups that You Need to Schedule     Ambulatory Referral to Home Health ([facility])   As directed      Face to Face Visit Date: 11/1/2022    Follow-up provider for Plan of Care?: I treated the patient in an acute care facility and will not continue treatment after discharge.    Follow-up provider: ILSA JACOBO [[id number]]    Reason/Clinical Findings: weakness after hospitalization    Describe mobility limitations that make leaving home difficult: weakness after hospitalization    Nursing/Therapeutic Services Requested: Physical Therapy Skilled Nursing Occupational Therapy    Skilled nursing orders: O2 instruction    Frequency: 1 Week 1         Ambulatory Referral to Sleep Medicine   As directed      Follow-up needed: Yes         Discharge Follow-up with PCP   As directed       Currently Documented PCP:    Ilsa Jacobo NP    PCP Phone Number:    [phone]     Follow Up Details: 1-2 weeks         Discharge Follow-up with Specified Provider: nephrology as directed   As directed      To: nephrology as directed            Follow-up Information     [facility]EP [facility] .    Specialty: Sleep Medicine  Contact information:  [address] [place] [address] [phone]  Additional information:  Phone Number: [phone]           Ilsa Jacobo NP .    Specialty: Family Medicine  Why:  1-2 weeks  Contact information:  [address]  [place] [id number]  [phone]                         Additional Instructions for the Follow-ups that You Need to Schedule     Ambulatory Referral to Home Health (Hospital)   As directed      Face to Face Visit Date: 11/1/2022    Follow-up provider for Plan of Care?: I treated the patient in an acute care facility and will not continue treatment after discharge.    Follow-up provider: ILSA JACOBO [[id number]]    Reason/Clinical Findings: weakness after hospitalization    Describe mobility limitations that make leaving home difficult: weakness after hospitalization    Nursing/Therapeutic Services Requested: Physical Therapy Skilled Nursing Occupational Therapy    Skilled nursing orders: O2 instruction    Frequency: 1 Week 1         Ambulatory Referral to Sleep Medicine   As directed      Follow-up needed: Yes         Discharge Follow-up with PCP   As directed       Currently Documented PCP:    Ilsa Jacobo, NP    PCP Phone Number:    [phone]     Follow Up Details: 1-2 weeks         Discharge Follow-up with Specified Provider: nephrology as directed   As directed      To: nephrology as directed           Time Spent on Discharge:  Greater than 30 minutes      Osmar King MD  [facility]  11/01/22  09:35 EDT

## 2022-11-01 NOTE — CASE MANAGEMENT/SOCIAL WORK
Continued Stay Note  [facility]     Patient Name: Zaina Martinez  MRN: [id number]  Today's Date: 11/1/2022    Admit Date: 10/26/2022    Plan: Home with spouse and HH ([facility] referral pending)   Discharge Plan     Row Name 11/01/22 1011       Plan    Plan Home with spouse and HH ([facility] referral pending)    Patient/Family in Agreement with Plan yes    Plan Comments CCP spoke with patient via room phone (due to isolation), per patient's request. Patient states she has talked with her  and they have both agreed that the patient will go home. CCP mentioned HH and the patient states she is current with HH through her PCP/extended care house calls. CCP reviewed recent admission and noted [facility], patient was unsure of HH agency name. Referral placed with patient's consent and CCP spoke with Viola/CHRISTY who states patient is not current and is unable to accept. Multiple HH referrals placed. CCP offered to call patient's  twice to review discharge plan, patient declined, stating she had just talked with him.  to transport at discharge per patient. Gina/Signature notified patient not needing SNF. Norm JUNG RN CCP               Discharge Codes    No documentation.               Expected Discharge Date and Time     Expected Discharge Date Expected Discharge Time    Nov 1, 2022             Norm Pino RN

## 2022-11-01 NOTE — PLAN OF CARE
Goal Outcome Evaluation:  Plan of Care Reviewed With: patient        Progress: improving  Outcome Evaluation: Pt received [facility] upon arrival and agreeable to PT. Pt completed B LE strengthening exercises x 10 prior to mobility. Pt stood and ambulated 120' c RW requiring SBA. Pt improving as demonstrated with increased activity tolerance with less assistance. Pt presents with slow pace but no unsteadiness of SOA with activity. Pt plans to return home at D/C with assist and HHPT. Pt has appropirate DME and PT reviewed home safety.

## 2022-11-01 NOTE — DISCHARGE PLACEMENT REQUEST
"Zaina Motta (57 y.o. Female)     Date of Birth   1965    Social Security Number       Address   [address] [place] [address]    Home Phone   [phone]    MRN   [id number]       Rastafarian   None    Marital Status                               Admission Date   10/26/22    Admission Type   Emergency    Admitting Provider   Starla Boston MD    Attending Provider   Osmar King MD    Department, Room/Bed   [address], 96/1       Discharge Date       Discharge Disposition   Home-Health Care [facility]    Discharge Destination                               Attending Provider: Osmar King MD    Allergies: Contrast Dye, Ibuprofen, Prochlorperazine    Isolation: Droplet   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), Rhinovirus  (10/26/22)   Code Status: CPR    Ht: 157.5 cm (62\")   Wt: 54.1 kg (119 lb 4.3 oz)    Admission Cmt: None   Principal Problem: Other hypervolemia [E87.79]                 Active Insurance as of 10/26/2022     Primary Coverage     Payor Plan Insurance Group Employer/Plan Group    ANTHEM MEDICARE REPLACEMENT ANTHEM MEDICARE ADVANTAGE [id number]     Payor Plan Address Payor Plan Phone Number Payor Plan Fax Number Effective Dates    [address] [phone]  1/1/2019 - None Entered    [facility] [address]       Subscriber Name Subscriber Birth Date Member ID       ZAINA MOTTA 1965 [id number]                 Emergency Contacts      (Rel.) Home Phone Work Phone Mobile Phone    JuanMarv (Spouse) [phone] -- [phone]    Fiona Motta (Daughter) [phone] -- [phone]            {Outbreak/Travel/Exposure Documentation......;  Question Available Choices Patient Response   COVID-19 Outbreak Screen:  Do you currently have a new onset of the following symptoms?        Fever/Chills, Cough, Shortness of air, Loss of taste or smell, No, Unknown  No (10/26/22 1421)   COVID-19 Outbreak Screen: In the last 14 " days, have you had contact with anyone who is ill, has show any of the symptoms listed above and/or has been diagnosis with the 2019 Novel Coronavirus? This includes any immediate household members but excludes any patients with whom you have been in contact within your normal work duties wearing proper PPE, if you are a healthcare worker.  Yes, No, Unknown              No (10/26/22 1421)   COVID-19 Outbreak Screen: Who was notified? Free text (not recorded)   Ebola Screening Outbreak Screen: Have you traveled to the [place] the [place] or [place] within the past 21 days?  Yes, No, Unknown (not recorded)   Ebola Screening Outbreak Screen: Do you have ANY of the following symptoms: Fever/Chills, Vomiting, Diarrhea, Fatigue, Headache, Muscle pain, Unexplained bleeding, Abdominal (stomach) pain, No, Unknown (not recorded)   Ebola Screening Outbreak Screen: Name of Person notified Free text (not recorded)   Travel Screen: Have you traveled in the last month? If so, to what country have you traveled? If [place] what state? Yes, No, Unknown  List of all countries  List of all States No (10/26/22 1421)  (not recorded)  (not recorded)   Infection Risk: Do you currently have the following symptoms?  (If cough is selected, the Tuberculosis Screen is performed.) Cough, Fever, Rash, No No (10/26/22 1421)   Tuberculosis Screen: Do you have any of the following Tuberculosis Risks?  · Have you lived or spent time with anyone who had or may have TB?  · Have you lived in or visited any of the following areas for more than one month: [place], [place], [place], [place] or [place], the [place] or [place]?  · Do you have HIV/AIDS?  · Have you lived in or worked in a nursing home, homeless shelter, correctional facility, or substance abuse treatment facility?   · No    If Yes do you have any of the following symptoms? Yes responses display to the right    If Yes, symptoms listed are:  Cough greater than or equal to 3  weeks, Loss of appetite, Unexplained weight loss, Night sweats, Bloody sputum or hemoptysis, Hoarseness, Fever, Fatigue, Chest pain, No (not recorded)  (not recorded)   Exposure Screen: Have you been exposed to any of these contagious diseases in the last month? Measles, Chickenpox, Meningitis, Pertussis, Whooping Cough, No No (10/26/22 8064)

## 2022-11-01 NOTE — THERAPY TREATMENT NOTE
Patient Name: Zaina Martinez  : 1965    MRN: [id number]                              Today's Date: 2022       Admit Date: 10/26/2022    Visit Dx:     ICD-10-CM ICD-9-CM   1. Other hypervolemia  E87.79 276.69   2. ESRD (end stage renal disease) on dialysis ([facility])  N18.6 585.6    Z99.2 V45.11   3. Acute pulmonary edema ([facility])  J81.0 518.4   4. Nocturnal hypoxia  G47.34 327.24   5. Acute respiratory failure with hypoxia ([facility])  J96.01 518.81   6. Acute on chronic systolic CHF (congestive heart failure) ([facility]ly [facility])  I50.23 428.23     428.0     Patient Active Problem List   Diagnosis   • Renal insufficiency   • Hypertensive disorder   • Hypothyroidism   • Type 2 diabetes mellitus with kidney complication, with long-term current use of insulin ([facility])   • Rheumatoid arthritis ([facility])   • Angioedema   • Esophageal dysmotility   • Anemia   • Medically noncompliant   • Myocardial infarction due to demand ischemia ([facility])   • Enteritis   • PRES (posterior reversible encephalopathy syndrome)   • Urine retention   • Klebsiella infection   • Superficial thrombophlebitis   • Generalized weakness   • ESRD (end stage renal disease) ([facility])   • CAD (coronary artery disease)   • Abnormal urinalysis   • Chronic diastolic CHF (congestive heart failure) ([facility])   • Pyelonephritis   • Calculus of gallbladder with acute on chronic cholecystitis without obstruction   • Pleural effusion on right   • Anemia due to chronic kidney disease, on chronic dialysis ([facility])   • Abnormal findings on diagnostic imaging of other specified body structures   • Acute upper respiratory infection   • Agitation   • Alkaline phosphatase raised   • Casts present in urine   • Cellulitis of toe   • Hip pain   • Community acquired pneumonia   • Depressive disorder   • Diarrhea of presumed infectious origin   • Difficult or painful urination   • Disease due to severe acute respiratory syndrome coronavirus 2 (SARS-CoV-2)   • Dyspnea   • Encounter for follow-up examination  after completed treatment for conditions other than malignant neoplasm   • H/O: hypothyroidism   • Hyperlipidemia   • Hypomagnesemia   • Intractable vomiting with nausea   • Leukocytosis   • Luetscher's syndrome   • Need for influenza vaccination   • Restless legs   • Noncompliance with treatment   • Shoulder pain   • Acute UTI (urinary tract infection)   • Metabolic encephalopathy   • Abnormal findings on diagnostic imaging of abdomen   • Status post cholecystectomy   • Hyponatremia   • Leukocytosis   • Acute metabolic encephalopathy   • Encephalopathy, toxic   • Acute CVA (cerebrovascular accident) (HCC)   • Intracranial hemorrhage (HCC)   • Stroke (HCC)   • Abnormal urinalysis   • Diabetic muscle infarction (HCC)   • Other insomnia   • Altered mental status   • History of stroke- R MCA s/p TPA with subsequent ICH with debility   • Heart murmur   • Bradycardia   • Left lower lobe pneumonia   • Metabolic encephalopathy   • Pericardial effusion   • Pleural effusion   • Acute pulmonary edema (HCC)   • Atrial flutter (HCC)   • Chronic systolic CHF (congestive heart failure) (HCC)   • Thrombus of venous dialysis catheter (HCC)   • Sepsis without acute organ dysfunction (HCC)   • Type 2 diabetes mellitus with hyperglycemia, with long-term current use of insulin (HCC)   • Acute respiratory failure with hypoxia (HCC)   • Acute on chronic systolic CHF (congestive heart failure) (HCC)   • Hyperkalemia   • Acute respiratory failure with hypoxia (HCC)   • Other hypervolemia     Past Medical History:   Diagnosis Date   • Acute CVA (cerebrovascular accident) (HCC) 05/01/2022   • Acute on chronic diastolic CHF (congestive heart failure) (HCC)    • Anemia    • CAD (coronary artery disease) 12/20/2021   • Diabetes (HCC)    • Disease of thyroid gland    • GERD (gastroesophageal reflux disease)    • Hyperlipidemia 11/30/2018   • Hypertension    • Rheumatoid arthritis (HCC)    • Stage 5 chronic kidney disease (HCC)      Past Surgical  History:   Procedure Laterality Date   • CHOLECYSTECTOMY WITH INTRAOPERATIVE CHOLANGIOGRAM N/A 1/10/2022    Procedure: Laparoscopic cholecystectomy with intraoperative cholangiogram;  Surgeon: Ramana Raygoza MD;  Location: [facility] OR;  Service: General;  Laterality: N/A;   • CYSTOSCOPY W/ URETERAL STENT PLACEMENT Left 9/29/2022    Procedure: CYSTOSCOPY URETERAL STENT INSERTION WITH RETROGRADE PYELOGRAM;  Surgeon: Bryant Phan Jr., MD;  Location: [facility] OR;  Service: Urology;  Laterality: Left;   • EYE SURGERY     • HYSTERECTOMY     • INSERTION HEMODIALYSIS CATHETER N/A 12/6/2021    Procedure: HEMODIALYSIS CATHETER INSERTION;  Surgeon: Keli Salazar MD;  Location: [facility] OR 18/19;  Service: Vascular;  Laterality: N/A;   • INSERTION HEMODIALYSIS CATHETER N/A 5/3/2022    Procedure: TUNNELED CATHETER PLACEMENT;  Surgeon: Keli Salazar MD;  Location: [facility] OR;  Service: Vascular;  Laterality: N/A;   • MUSCLE BIOPSY Left 6/15/2022    Procedure: Left quadriceps muscle biopsy;  Surgeon: Marques Ma MD;  Location: [facility] OR;  Service: Neurosurgery;  Laterality: Left;      General Information     Row Name 11/01/22 1130          Physical Therapy Time and Intention    Document Type therapy note (daily note)  -CS     Mode of Treatment physical therapy  -CS     Row Name 11/01/22 1130          General Information    Existing Precautions/Restrictions fall  -CS     Row Name 11/01/22 1130          Cognition    Orientation Status (Cognition) oriented x 4  -CS     Row Name 11/01/22 1130          Safety Issues, Functional Mobility    Impairments Affecting Function (Mobility) endurance/activity tolerance;strength  -CS           User Key  (r) = Recorded By, (t) = Taken By, (c) = Cosigned By    Initials Name Provider Type    CS Chelo De La Garza, PT Physical Therapist               Mobility     Row Name 11/01/22 1130          Bed Mobility    Comment, (Bed Mobility) NT; [facility]  -CS     Row  Name 11/01/22 1130          Sit-Stand Transfer    Sit-Stand [place] (Transfers) standby assist  -CS     Assistive Device (Sit-Stand Transfers) walker, front-wheeled  -CS     Row Name 11/01/22 1130          Gait/Stairs (Locomotion)    [place] Level (Gait) standby assist;contact guard  -CS     Assistive Device (Gait) walker, front-wheeled  -CS     Distance in Feet (Gait) 120'  -CS     Deviations/Abnormal Patterns (Gait) kenn decreased;gait speed decreased  -CS     Comment, (Gait/Stairs) slow pace but no unsteadiness noted  -CS           User Key  (r) = Recorded By, (t) = Taken By, (c) = Cosigned By    Initials Name Provider Type    CS Chelo De La Garza, PT Physical Therapist               Obj/Interventions     Row Name 11/01/22 1131          Motor Skills    Therapeutic Exercise other (see comments)  10 reps B LE AP  -CS     Row Name 11/01/22 1131          Balance    Balance Assessment sitting static balance;sitting dynamic balance;standing static balance;standing dynamic balance  -CS     Static Sitting Balance independent  -CS     Dynamic Sitting Balance independent  -CS     Position, Sitting Balance sitting in chair  -CS     Static Standing Balance standby assist  -CS     Dynamic Standing Balance standby assist  -CS     Position/Device Used, Standing Balance supported;walker, front-wheeled  -CS           User Key  (r) = Recorded By, (t) = Taken By, (c) = Cosigned By    Initials Name Provider Type    CS Chelo De La Garza, PT Physical Therapist               Goals/Plan    No documentation.                Clinical Impression     Row Name 11/01/22 1131          Pain    Pretreatment Pain Rating 0/10 - no pain  -CS     Posttreatment Pain Rating 0/10 - no pain  -CS     Row Name 11/01/22 1131          Plan of Care Review    Plan of Care Reviewed With patient  -CS     Progress improving  -CS     Outcome Evaluation Pt received [facility] upon arrival and agreeable to PT. Pt completed B LE strengthening exercises x 10 prior  to mobility. Pt stood and ambulated 120' c RW requiring SBA. Pt improving as demonstrated with increased activity tolerance with less assistance. Pt presents with slow pace but no unsteadiness of SOA with activity. Pt plans to return home at D/C with assist and HHPT. Pt has appropirate DME and PT reviewed home safety.  -CS     Row Name 11/01/22 1131          Therapy Assessment/Plan (PT)    Criteria for Skilled Interventions Met (PT) yes;meets criteria  -CS     Therapy Frequency (PT) 5 times/wk  -CS     Row Name 11/01/22 1131          Positioning and Restraints    Pre-Treatment Position sitting in chair/recliner  -CS     Post Treatment Position chair  -CS     In Chair reclined;call light within reach;encouraged to call for assist;with nsg  no alarm upon entry  -CS           User Key  (r) = Recorded By, (t) = Taken By, (c) = Cosigned By    Initials Name Provider Type    Chelo Pena, PT Physical Therapist               Outcome Measures     Row Name 11/01/22 1134 11/01/22 0910       How much help from another person do you currently need...    Turning from your back to your side while in flat bed without using bedrails? 4  -CS 4  -MA    Moving from lying on back to sitting on the side of a flat bed without bedrails? 4  -CS 4  -MA    Moving to and from a bed to a chair (including a wheelchair)? 4  -CS 3  -MA    Standing up from a chair using your arms (e.g., wheelchair, bedside chair)? 4  -CS 4  -MA    Climbing 3-5 steps with a railing? 3  -CS 2  -MA    To walk in hospital room? 4  -CS 2  -MA    AM-PAC 6 Clicks Score (PT) 23  -CS 19  -MA    Highest level of mobility 7 --> Walked 25 feet or more  -CS 6 --> Walked 10 steps or more  -MA    Row Name 11/01/22 1134 11/01/22 1117       Functional Assessment    Outcome Measure Options AM-PAC 6 Clicks Basic Mobility (PT)  -CS AM-PAC 6 Clicks Daily Activity (OT)  -MW          User Key  (r) = Recorded By, (t) = Taken By, (c) = Cosigned By    Initials Name Provider Type    MA  Tiesha Roca, RN Registered Nurse    Claudine Vera, OT Occupational Therapist    Chelo Pena, PT Physical Therapist                             Physical Therapy Education     Title: PT OT SLP Therapies (Done)     Topic: Physical Therapy (Resolved)     Point: Mobility training (Resolved)     Learning Progress Summary           Patient Acceptance, E,TB, VU,DU by  at 11/1/2022 1135    Acceptance, E, NR by  at 10/30/2022 1208    Acceptance, E, VU,NR by CF at 10/28/2022 1702                   Point: Home exercise program (Resolved)     Learning Progress Summary           Patient Acceptance, E,TB, VU,DU by  at 11/1/2022 1135                   Point: Body mechanics (Resolved)     Learning Progress Summary           Patient Acceptance, E,TB, VU,DU by  at 11/1/2022 1135                   Point: Precautions (Resolved)     Learning Progress Summary           Patient Acceptance, E,TB, VU,DU by  at 11/1/2022 1135                               User Key     Initials Effective Dates Name Provider Type Discipline     06/16/21 -  Samantha Zarate, PT Physical Therapist PT     09/22/22 -  Chelo De La Garza, VICKIE Physical Therapist PT              PT Recommendation and Plan     Plan of Care Reviewed With: patient  Progress: improving  Outcome Evaluation: Pt received [facility] upon arrival and agreeable to PT. Pt completed B LE strengthening exercises x 10 prior to mobility. Pt stood and ambulated 120' c RW requiring SBA. Pt improving as demonstrated with increased activity tolerance with less assistance. Pt presents with slow pace but no unsteadiness of SOA with activity. Pt plans to return home at D/C with assist and HHPT. Pt has appropirate DME and PT reviewed home safety.     Time Calculation:    PT Charges     Row Name 11/01/22 1135             Time Calculation    Start Time 0900  -      Stop Time 0918  -      Time Calculation (min) 18 min  -CS      PT Received On 11/01/22  -      PT - Next  Appointment 11/02/22  -CS         Time Calculation- PT    Total Timed Code Minutes- PT 16 minute(s)  -CS         Timed Charges    00206 - PT Therapeutic Activity Minutes 16  -CS         Total Minutes    Timed Charges Total Minutes 16  -CS       Total Minutes 16  -CS            User Key  (r) = Recorded By, (t) = Taken By, (c) = Cosigned By    Initials Name Provider Type    CS Chelo De La Garza, PT Physical Therapist              Therapy Charges for Today     Code Description Service Date Service Provider Modifiers Qty    [id number]  PT THERAPEUTIC ACT EA 15 MIN 11/1/2022 Chelo De La Garza, PT GP 1          PT G-Codes  Outcome Measure Options: AM-PAC 6 Clicks Basic Mobility (PT)  AM-PAC 6 Clicks Score (PT): 23  AM-PAC 6 Clicks Score (OT): 20    Chelo De La Garza PT  11/1/2022

## 2022-11-01 NOTE — PLAN OF CARE
Goal Outcome Evaluation:  Plan of Care Reviewed With: patient        Progress: improving  Outcome Evaluation: Pt seen this date for OT tx session, A&Ox4, [facility] upon arrival of [facility]. Pt completed household distances this date, noting improved upright activity tolerace required for ADL tasks. Pt with no balance deficits or impaired safety throughout, SBA with Rwx. Pt reports she now plans home with her spouse, has all DME, plans for HHOT to f/u to ensure safe transition home. Continue to progress as able. Plans home today.

## 2022-11-01 NOTE — CASE MANAGEMENT/SOCIAL WORK
Case Management Discharge Note      Final Note: Patient discharging home with spouse and [facility]  and nocturnal 02 via [facility]. CCP updated patient that [facility] HH accepted and patient agreeable. Norm JUNG RN CCP         Selected Continued Care - Admitted Since 10/26/2022     Destination    No services have been selected for the patient.              Durable Medical Equipment    No services have been selected for the patient.              Dialysis/Infusion    No services have been selected for the patient.              Home Medical Care Coordination complete.    Service Provider Selected Services Address Phone Fax Patient Preferred    [facility] [address] [facility] [address] [address] [phone] --          Therapy    No services have been selected for the patient.              Community Resources    No services have been selected for the patient.              Community & DME    No services have been selected for the patient.                Selected Continued Care - Prior Encounters Includes continued care and service providers with selected services from prior encounters from 7/28/2022 to 11/1/2022    Discharged on 10/17/2022 Admission date: 10/12/2022 - Discharge disposition: Home-Health Care [facility]    Home Medical Care     Service Provider Selected Services Address Phone Fax Patient Preferred    [facility] [address] [facility] [address] [address] [phone] --                Discharged on 10/6/2022 Admission date: 9/26/2022 - Discharge disposition: Home-Health Care [facility]    Durable Medical Equipment     Service Provider Selected Services Address Phone Fax Patient Preferred    [facility] [address] [facility] [id number] [phone] --          Home Medical Care     Service Provider Selected Services Address Phone  Fax Patient Preferred    [facility] [address] Sud[place] [place] [address] [phone] --                Discharged on 9/13/2022 Admission date: 9/3/2022 - Discharge disposition: Home-Health Care [facility]    Durable Medical Equipment     Service Provider Selected Services Address Phone Fax Patient Preferred    [facility] [address] [facility] [address] [phone] --          Home Medical Care     Service Provider Selected Services Address Phone Fax Patient Preferred    [facility] [address] [address] [phone] --                Discharged on 8/27/2022 Admission date: 8/19/2022 - Discharge disposition: Home-Health Care [facility]    Durable Medical Equipment     Service Provider Selected Services Address Phone Fax Patient Preferred    [facility] [address] [facility] [address] [phone] --          Home Medical Care     Service Provider Selected Services Address Phone Fax Patient Preferred    [facility] [address] [address] [phone] --                Discharged on 8/4/2022 Admission date: 7/27/2022 - Discharge disposition: Home-Health Care Sv    Dialysis/Infusion     Service Provider Selected Services Address Phone Fax Patient Preferred    [facility] [address] [address] [phone] --          Home Medical Care     Service Provider Selected Services Address Phone Fax Patient Preferred    [facility] [address] [address] [phone] --                    Transportation Services  Private: Car    Final Discharge Disposition Code: 06 - home with home  health care

## 2022-11-01 NOTE — NURSING NOTE
Patient and daughter were given discharge instructions.  Patient was given extensive information regarding fluid restrictions, and patient verbalized understanding.  She is aware she is going to have HD tomorrow to keep her on the routine of M-W-F.  All questions answered.  Pt ready to go home.

## 2022-11-01 NOTE — THERAPY TREATMENT NOTE
Patient Name: Zaina Martinez  : 1965    MRN: [id number]                              Today's Date: 2022       Admit Date: 10/26/2022    Visit Dx:     ICD-10-CM ICD-9-CM   1. Other hypervolemia  E87.79 276.69   2. ESRD (end stage renal disease) on dialysis ([facility])  N18.6 585.6    Z99.2 V45.11   3. Acute pulmonary edema ([facility])  J81.0 518.4   4. Nocturnal hypoxia  G47.34 327.24   5. Acute respiratory failure with hypoxia ([facility])  J96.01 518.81   6. Acute on chronic systolic CHF (congestive heart failure) ([facility])  I50.23 428.23     428.0     Patient Active Problem List   Diagnosis   • Renal insufficiency   • Hypertensive disorder   • Hypothyroidism   • Type 2 diabetes mellitus with kidney complication, with long-term current use of insulin ([facility])   • Rheumatoid arthritis ([facility])   • Angioedema   • Esophageal dysmotility   • Anemia   • Medically noncompliant   • Myocardial infarction due to demand ischemia ([facility])   • Enteritis   • PRES (posterior reversible encephalopathy syndrome)   • Urine retention   • Klebsiella infection   • Superficial thrombophlebitis   • Generalized weakness   • ESRD (end stage renal disease) ([facility])   • CAD (coronary artery disease)   • Abnormal urinalysis   • Chronic diastolic CHF (congestive heart failure) ([facility])   • Pyelonephritis   • Calculus of gallbladder with acute on chronic cholecystitis without obstruction   • Pleural effusion on right   • Anemia due to chronic kidney disease, on chronic dialysis ([facility])   • Abnormal findings on diagnostic imaging of other specified body structures   • Acute upper respiratory infection   • Agitation   • Alkaline phosphatase raised   • Casts present in urine   • Cellulitis of toe   • Hip pain   • Community acquired pneumonia   • Depressive disorder   • Diarrhea of presumed infectious origin   • Difficult or painful urination   • Disease due to severe acute respiratory syndrome coronavirus 2 (SARS-CoV-2)   • Dyspnea   • Encounter for follow-up examination  after completed treatment for conditions other than malignant neoplasm   • H/O: hypothyroidism   • Hyperlipidemia   • Hypomagnesemia   • Intractable vomiting with nausea   • Leukocytosis   • Luetscher's syndrome   • Need for influenza vaccination   • Restless legs   • Noncompliance with treatment   • Shoulder pain   • Acute UTI (urinary tract infection)   • Metabolic encephalopathy   • Abnormal findings on diagnostic imaging of abdomen   • Status post cholecystectomy   • Hyponatremia   • Leukocytosis   • Acute metabolic encephalopathy   • Encephalopathy, toxic   • Acute CVA (cerebrovascular accident) (HCC)   • Intracranial hemorrhage (HCC)   • Stroke (HCC)   • Abnormal urinalysis   • Diabetic muscle infarction (HCC)   • Other insomnia   • Altered mental status   • History of stroke- R MCA s/p TPA with subsequent ICH with debility   • Heart murmur   • Bradycardia   • Left lower lobe pneumonia   • Metabolic encephalopathy   • Pericardial effusion   • Pleural effusion   • Acute pulmonary edema (HCC)   • Atrial flutter (HCC)   • Chronic systolic CHF (congestive heart failure) (HCC)   • Thrombus of venous dialysis catheter (HCC)   • Sepsis without acute organ dysfunction (HCC)   • Type 2 diabetes mellitus with hyperglycemia, with long-term current use of insulin (HCC)   • Acute respiratory failure with hypoxia (HCC)   • Acute on chronic systolic CHF (congestive heart failure) (HCC)   • Hyperkalemia   • Acute respiratory failure with hypoxia (HCC)   • Other hypervolemia     Past Medical History:   Diagnosis Date   • Acute CVA (cerebrovascular accident) (HCC) 05/01/2022   • Acute on chronic diastolic CHF (congestive heart failure) (HCC)    • Anemia    • CAD (coronary artery disease) 12/20/2021   • Diabetes (HCC)    • Disease of thyroid gland    • GERD (gastroesophageal reflux disease)    • Hyperlipidemia 11/30/2018   • Hypertension    • Rheumatoid arthritis (HCC)    • Stage 5 chronic kidney disease (HCC)      Past Surgical  History:   Procedure Laterality Date   • CHOLECYSTECTOMY WITH INTRAOPERATIVE CHOLANGIOGRAM N/A 1/10/2022    Procedure: Laparoscopic cholecystectomy with intraoperative cholangiogram;  Surgeon: Ramana Raygoza MD;  Location: [facility] OR;  Service: General;  Laterality: N/A;   • CYSTOSCOPY W/ URETERAL STENT PLACEMENT Left 9/29/2022    Procedure: CYSTOSCOPY URETERAL STENT INSERTION WITH RETROGRADE PYELOGRAM;  Surgeon: Bryant Phan Jr., MD;  Location: [facility] OR;  Service: Urology;  Laterality: Left;   • EYE SURGERY     • HYSTERECTOMY     • INSERTION HEMODIALYSIS CATHETER N/A 12/6/2021    Procedure: HEMODIALYSIS CATHETER INSERTION;  Surgeon: Keli Salazar MD;  Location: [facility] OR 18/19;  Service: Vascular;  Laterality: N/A;   • INSERTION HEMODIALYSIS CATHETER N/A 5/3/2022    Procedure: TUNNELED CATHETER PLACEMENT;  Surgeon: Keli Salazar MD;  Location: [facility] OR;  Service: Vascular;  Laterality: N/A;   • MUSCLE BIOPSY Left 6/15/2022    Procedure: Left quadriceps muscle biopsy;  Surgeon: Marques Ma MD;  Location: [facility] OR;  Service: Neurosurgery;  Laterality: Left;      General Information     Row Name 11/01/22 1113          OT Time and Intention    Document Type therapy note (daily note)  -MW     Mode of Treatment occupational therapy  -     Row Name 11/01/22 1113          General Information    Patient Profile Reviewed yes  -MW     Existing Precautions/Restrictions fall  -MW     Row Name 11/01/22 1113          Cognition    Orientation Status (Cognition) oriented x 4  -MW     Row Name 11/01/22 1113          Safety Issues, Functional Mobility    Impairments Affecting Function (Mobility) endurance/activity tolerance;strength;shortness of breath;balance  -           User Key  (r) = Recorded By, (t) = Taken By, (c) = Cosigned By    Initials Name Provider Type    MW Claudine Abbott OT Occupational Therapist                 Mobility/ADL's     Row Name 11/01/22 1113           Bed Mobility    Comment, (Bed Mobility) NT UIC  -     Row Name 11/01/22 1113          Transfers    Transfers sit-stand transfer  -     Row Name 11/01/22 1113          Sit-Stand Transfer    Sit-Stand Haakon (Transfers) standby assist  -     Assistive Device (Sit-Stand Transfers) walker, front-wheeled  -     Row Name 11/01/22 1113          Toilet Transfer    Comment, (Toilet Transfer) declined need to use bathroom this date  -     Row Name 11/01/22 1113          Functional Mobility    Functional Mobility- Ind. Level standby assist  -     Functional Mobility- Device walker, front-wheeled  -     Functional Mobility- Comment pt demo household distances this date with SBA using Rwx, no rest breaks required, no balance deficits or impaired safety awareness noted  -     Row Name 11/01/22 1113          Activities of Daily Living    BADL Assessment/Intervention feeding;grooming  -     Row Name 11/01/22 1113          Grooming Assessment/Training    Comment, (Grooming) s/up  -     Row Name 11/01/22 1113          Self-Feeding Assessment/Training    Haakon Level (Feeding) feeding skills;set up  -     Position (Self-Feeding) supported sitting  -           User Key  (r) = Recorded By, (t) = Taken By, (c) = Cosigned By    Initials Name Provider Type    Claudine Vera OT Occupational Therapist               Obj/Interventions     Row Name 11/01/22 1114          Balance    Balance Assessment sitting static balance;sitting dynamic balance;sit to stand dynamic balance;standing static balance;standing dynamic balance  -     Static Sitting Balance independent  -     Dynamic Sitting Balance standby assist  -     Position, Sitting Balance sitting in chair  -     Sit to Stand Dynamic Balance standby assist  -     Static Standing Balance standby assist  -     Dynamic Standing Balance standby assist  -     Position/Device Used, Standing Balance supported;walker, front-wheeled   -MW     Balance Interventions sitting;standing;sit to stand;supported;static;dynamic;minimal challenge  -MW     Comment, Balance no LOBs  -MW           User Key  (r) = Recorded By, (t) = Taken By, (c) = Cosigned By    Initials Name Provider Type    Claudine Vera OT Occupational Therapist               Goals/Plan    No documentation.                Clinical Impression     Row Name 11/01/22 1115          Pain Assessment    Pretreatment Pain Rating 0/10 - no pain  -MW     Posttreatment Pain Rating 0/10 - no pain  -MW     Row Name 11/01/22 1115          Plan of Care Review    Plan of Care Reviewed With patient  -MW     Progress improving  -MW     Outcome Evaluation Pt seen this date for OT tx session, A&Ox4, UIC upon arrival of [facility]. Pt completed household distances this date, noting improved upright activity tolerace required for ADL tasks. Pt with no balance deficits or impaired safety throughout, SBA with Rwx. Pt reports she now plans home with her spouse, has all DME, plans for HHOT to f/u to ensure safe transition home. Continue to progress as able. Plans home today.  -MW     Row Name 11/01/22 1115          Therapy Plan Review/Discharge Plan (OT)    Anticipated Discharge Disposition (OT) home with assist;home with home health  -MW     Row Name 11/01/22 1115          Vital Signs    O2 Delivery Intra Treatment room air  -MW     Pre Patient Position Sitting  -MW     Intra Patient Position Standing  -MW     Post Patient Position Sitting  -MW     Row Name 11/01/22 1115          Positioning and Restraints    Pre-Treatment Position sitting in chair/recliner  -MW     Post Treatment Position chair  -MW     In Chair notified nsg;reclined;call light within reach;with nsg;encouraged to call for assist  -MW           User Key  (r) = Recorded By, (t) = Taken By, (c) = Cosigned By    Initials Name Provider Type    Claudine Vera OT Occupational Therapist               Outcome Measures     Row Name 11/01/22 1117           How much help from another is currently needed...    Putting on and taking off regular lower body clothing? 3  -MW     Bathing (including washing, rinsing, and drying) 3  -MW     Toileting (which includes using toilet bed pan or urinal) 3  -MW     Putting on and taking off regular upper body clothing 3  -MW     Taking care of personal grooming (such as brushing teeth) 4  -MW     Eating meals 4  -MW     AM-PAC 6 Clicks Score (OT) 20  -MW     Row Name 11/01/22 0910          How much help from another person do you currently need...    Turning from your back to your side while in flat bed without using bedrails? 4  -MA     Moving from lying on back to sitting on the side of a flat bed without bedrails? 4  -MA     Moving to and from a bed to a chair (including a wheelchair)? 3  -MA     Standing up from a chair using your arms (e.g., wheelchair, bedside chair)? 4  -MA     Climbing 3-5 steps with a railing? 2  -MA     To walk in hospital room? 2  -MA     AM-PAC 6 Clicks Score (PT) 19  -MA     Highest level of mobility 6 --> Walked 10 steps or more  -MA     Row Name 11/01/22 1117          Functional Assessment    Outcome Measure Options AM-PAC 6 Clicks Daily Activity (OT)  -MW           User Key  (r) = Recorded By, (t) = Taken By, (c) = Cosigned By    Initials Name Provider Type    Tiesha Hampton, RN Registered Nurse    Claudine Vera OT Occupational Therapist                Occupational Therapy Education     Title: PT OT SLP Therapies (Resolved)     Topic: Occupational Therapy (Resolved)     Point: ADL training (Resolved)     Description:   Instruct learner(s) on proper safety adaptation and remediation techniques during self care or transfers.   Instruct in proper use of assistive devices.              Learning Progress Summary           Patient Acceptance, E, VU by  at 10/28/2022 2085    Comment: OT goals, pacing ADL, PLB for O2 recovery/SOB.                   Point: Home exercise program  (Resolved)     Description:   Instruct learner(s) on appropriate technique for monitoring, assisting and/or progressing therapeutic exercises/activities.              Learner Progress:  Not documented in this visit.          Point: Precautions (Resolved)     Description:   Instruct learner(s) on prescribed precautions during self-care and functional transfers.              Learner Progress:  Not documented in this visit.          Point: Body mechanics (Resolved)     Description:   Instruct learner(s) on proper positioning and spine alignment during self-care, functional mobility activities and/or exercises.              Learner Progress:  Not documented in this visit.                      User Key     Initials Effective Dates Name Provider Type Discipline     04/02/20 -  Tesha Charles, OT Occupational Therapist OT              OT Recommendation and Plan     Plan of Care Review  Plan of Care Reviewed With: patient  Progress: improving  Outcome Evaluation: Pt seen this date for OT tx session, A&Ox4, UIC upon arrival of [facility]. Pt completed household distances this date, noting improved upright activity tolerace required for ADL tasks. Pt with no balance deficits or impaired safety throughout, SBA with Rwx. Pt reports she now plans home with her spouse, has all DME, plans for HHOT to f/u to ensure safe transition home. Continue to progress as able. Plans home today.     Time Calculation:    Time Calculation- OT     Row Name 11/01/22 1117             Time Calculation- OT    OT Start Time 0900  -MW      OT Stop Time 0917  -MW      OT Time Calculation (min) 17 min  -MW      Total Timed Code Minutes- OT 17 minute(s)  -MW      OT Received On 11/01/22  -MW      OT - Next Appointment 11/02/22  -MW         Timed Charges    56324 - OT Therapeutic Activity Minutes 17  -MW         Total Minutes    Timed Charges Total Minutes 17  -MW       Total Minutes 17  -MW            User Key  (r) = Recorded By, (t) = Taken By, (c) =  Cosigned By    Initials Name Provider Type    Claudine Vera OT Occupational Therapist              Therapy Charges for Today     Code Description Service Date Service Provider Modifiers Qty    [id number] HC OT THERAPEUTIC ACT EA 15 MIN 11/1/2022 Claudine Abbott OT GO 1               Claudine Abbott OT  11/1/2022

## 2022-11-02 NOTE — OUTREACH NOTE
Prep Survey    Flowsheet Row Responses   [facility] facility patient discharged from? [place]   Is LACE score < 7 ? No   Emergency Room discharge w/ pulse ox? No   Eligibility Readm Mgmt   Discharge diagnosis Other hypervolemia   Does the patient have one of the following disease processes/diagnoses(primary or secondary)? Other   Does the patient have Home health ordered? Yes   What is the Home health agency?  [facility]   Is there a DME ordered? No   Prep survey completed? Yes          KERRY FALCON - Registered Nurse

## 2022-11-04 ENCOUNTER — READMISSION MANAGEMENT (OUTPATIENT)
Dept: CALL CENTER | Facility: HOSPITAL | Age: 57
End: 2022-11-04

## 2022-11-04 NOTE — OUTREACH NOTE
Medical Week 1 Survey    Flowsheet Row Responses   [facility] patient discharged from? [place]   Does the patient have one of the following disease processes/diagnoses(primary or secondary)? Other   Week 1 attempt successful? Yes   Call start time 1547   Call end time 1549   General alerts for this patient HD--M,W, FR   Discharge diagnosis Other hypervolemia   Is patient permission given to speak with other caregiver? Yes   List who call center can speak with Spouse   Person spoke with today (if not patient) and relationship spouse   Meds reviewed with patient/caregiver? Yes   Is the patient having any side effects they believe may be caused by any medication additions or changes? No   Does the patient have all medications ordered at discharge? Yes   Is the patient taking all medications as directed (includes completed medication regime)? Yes   Does the patient have a primary care provider?  Yes   Does the patient have an appointment with their PCP within 7 days of discharge? Yes   Comments regarding PCP Spouse reports nurse practitioner saw patient in home today.   Has the patient kept scheduled appointments due by today? Yes   What is the Home health agency?  [facility]   Has home health visited the patient within 72 hours of discharge? Yes   Did the patient receive a copy of their discharge instructions? Yes   Nursing interventions Reviewed instructions with patient   What is the patient's perception of their health status since discharge? Improving  [Spouse reports O2 sats were in 80's yesterday on 4L. Today they have been 92% on RA, patient usually only uses O2 at night. Instructed Spouse that sat in the 80's is to low and he should seek urgent care for patient if they drop this low again]   Is the patient/caregiver able to teach back signs and symptoms related to disease process for when to call PCP? Yes   Is the patient/caregiver able to teach back signs and symptoms related to disease process for when  to call 911? Yes   Is the patient/caregiver able to teach back the hierarchy of who to call/visit for symptoms/problems? PCP, Specialist, Home health nurse, Urgent Care, ED, 911 Yes   If the patient is a current smoker, are they able to teach back resources for cessation? Not a smoker   Week 1 call completed? Yes          PEPE LUCERO - Registered Nurse

## 2022-11-07 ENCOUNTER — HOSPITAL ENCOUNTER (OUTPATIENT)
Facility: HOSPITAL | Age: 57
Setting detail: HOSPITAL OUTPATIENT SURGERY
End: 2022-11-07
Attending: UROLOGY | Admitting: UROLOGY

## 2022-11-08 ENCOUNTER — APPOINTMENT (OUTPATIENT)
Dept: GENERAL RADIOLOGY | Facility: HOSPITAL | Age: 57
End: 2022-11-08

## 2022-11-08 ENCOUNTER — HOSPITAL ENCOUNTER (INPATIENT)
Facility: HOSPITAL | Age: 57
LOS: 15 days | Discharge: NURSING, REHAB OR LONG-TERM CARE | End: 2022-11-24
Attending: EMERGENCY MEDICINE | Admitting: HOSPITALIST

## 2022-11-08 DIAGNOSIS — S72.144A CLOSED NONDISPLACED INTERTROCHANTERIC FRACTURE OF RIGHT FEMUR, INITIAL ENCOUNTER: ICD-10-CM

## 2022-11-08 DIAGNOSIS — N18.6 END STAGE RENAL DISEASE: ICD-10-CM

## 2022-11-08 DIAGNOSIS — S72.141A CLOSED DISPLACED INTERTROCHANTERIC FRACTURE OF RIGHT FEMUR, INITIAL ENCOUNTER: Primary | ICD-10-CM

## 2022-11-08 DIAGNOSIS — E87.5 HYPERKALEMIA: ICD-10-CM

## 2022-11-08 DIAGNOSIS — R79.89 ELEVATED TSH: ICD-10-CM

## 2022-11-08 DIAGNOSIS — S70.01XA HEMATOMA OF RIGHT HIP, INITIAL ENCOUNTER: ICD-10-CM

## 2022-11-08 DIAGNOSIS — N39.0 ACUTE UTI: ICD-10-CM

## 2022-11-08 DIAGNOSIS — E87.1 HYPONATREMIA: ICD-10-CM

## 2022-11-08 PROCEDURE — 99285 EMERGENCY DEPT VISIT HI MDM: CPT

## 2022-11-08 PROCEDURE — 93005 ELECTROCARDIOGRAM TRACING: CPT | Performed by: EMERGENCY MEDICINE

## 2022-11-08 PROCEDURE — 71045 X-RAY EXAM CHEST 1 VIEW: CPT

## 2022-11-08 PROCEDURE — 73502 X-RAY EXAM HIP UNI 2-3 VIEWS: CPT

## 2022-11-08 RX ORDER — SODIUM CHLORIDE 0.9 % (FLUSH) 0.9 %
10 SYRINGE (ML) INJECTION AS NEEDED
Status: DISCONTINUED | OUTPATIENT
Start: 2022-11-08 | End: 2022-11-24 | Stop reason: HOSPADM

## 2022-11-09 ENCOUNTER — APPOINTMENT (OUTPATIENT)
Dept: CT IMAGING | Facility: HOSPITAL | Age: 57
End: 2022-11-09

## 2022-11-09 ENCOUNTER — READMISSION MANAGEMENT (OUTPATIENT)
Dept: CALL CENTER | Facility: HOSPITAL | Age: 57
End: 2022-11-09

## 2022-11-09 PROBLEM — S72.141A CLOSED INTERTROCHANTERIC FRACTURE OF RIGHT FEMUR: Status: ACTIVE | Noted: 2022-01-01

## 2022-11-09 PROBLEM — Z96.0 URETERAL STENT PRESENT: Status: ACTIVE | Noted: 2022-11-09

## 2022-11-09 PROBLEM — S70.01XA HEMATOMA OF RIGHT HIP, INITIAL ENCOUNTER: Status: ACTIVE | Noted: 2022-11-09

## 2022-11-09 LAB
ALBUMIN SERPL-MCNC: 3.2 G/DL (ref 3.5–5.2)
ALBUMIN/GLOB SERPL: 0.9 G/DL
ALP SERPL-CCNC: 223 U/L (ref 39–117)
ALT SERPL W P-5'-P-CCNC: 19 U/L (ref 1–33)
ANION GAP SERPL CALCULATED.3IONS-SCNC: 11 MMOL/L (ref 5–15)
ANION GAP SERPL CALCULATED.3IONS-SCNC: 13.9 MMOL/L (ref 5–15)
AST SERPL-CCNC: 29 U/L (ref 1–32)
BACTERIA UR QL AUTO: ABNORMAL /HPF
BASOPHILS # BLD AUTO: 0.05 10*3/MM3 (ref 0–0.2)
BASOPHILS NFR BLD AUTO: 0.4 % (ref 0–1.5)
BILIRUB SERPL-MCNC: 0.3 MG/DL (ref 0–1.2)
BILIRUB UR QL STRIP: NEGATIVE
BUN SERPL-MCNC: 28 MG/DL (ref 6–20)
BUN SERPL-MCNC: 29 MG/DL (ref 6–20)
BUN/CREAT SERPL: 8.5 (ref 7–25)
BUN/CREAT SERPL: 8.5 (ref 7–25)
CALCIUM SPEC-SCNC: 7.9 MG/DL (ref 8.6–10.5)
CALCIUM SPEC-SCNC: 9 MG/DL (ref 8.6–10.5)
CHLORIDE SERPL-SCNC: 90 MMOL/L (ref 98–107)
CHLORIDE SERPL-SCNC: 93 MMOL/L (ref 98–107)
CK SERPL-CCNC: 39 U/L (ref 20–180)
CLARITY UR: ABNORMAL
CO2 SERPL-SCNC: 27.1 MMOL/L (ref 22–29)
CO2 SERPL-SCNC: 28 MMOL/L (ref 22–29)
COLOR UR: ABNORMAL
CREAT SERPL-MCNC: 3.3 MG/DL (ref 0.57–1)
CREAT SERPL-MCNC: 3.41 MG/DL (ref 0.57–1)
DEPRECATED RDW RBC AUTO: 45.3 FL (ref 37–54)
DEPRECATED RDW RBC AUTO: 47.5 FL (ref 37–54)
EGFRCR SERPLBLD CKD-EPI 2021: 15.1 ML/MIN/1.73
EGFRCR SERPLBLD CKD-EPI 2021: 15.7 ML/MIN/1.73
EOSINOPHIL # BLD AUTO: 0.07 10*3/MM3 (ref 0–0.4)
EOSINOPHIL NFR BLD AUTO: 0.5 % (ref 0.3–6.2)
ERYTHROCYTE [DISTWIDTH] IN BLOOD BY AUTOMATED COUNT: 15 % (ref 12.3–15.4)
ERYTHROCYTE [DISTWIDTH] IN BLOOD BY AUTOMATED COUNT: 15.1 % (ref 12.3–15.4)
GLOBULIN UR ELPH-MCNC: 3.7 GM/DL
GLUCOSE BLDC GLUCOMTR-MCNC: 111 MG/DL (ref 70–130)
GLUCOSE BLDC GLUCOMTR-MCNC: 153 MG/DL (ref 70–130)
GLUCOSE BLDC GLUCOMTR-MCNC: 197 MG/DL (ref 70–130)
GLUCOSE BLDC GLUCOMTR-MCNC: 229 MG/DL (ref 70–130)
GLUCOSE SERPL-MCNC: 234 MG/DL (ref 65–99)
GLUCOSE SERPL-MCNC: 313 MG/DL (ref 65–99)
GLUCOSE UR STRIP-MCNC: ABNORMAL MG/DL
HBA1C MFR BLD: 10 % (ref 4.8–5.6)
HCT VFR BLD AUTO: 27.7 % (ref 34–46.6)
HCT VFR BLD AUTO: 33.8 % (ref 34–46.6)
HGB BLD-MCNC: 10.6 G/DL (ref 12–15.9)
HGB BLD-MCNC: 8.9 G/DL (ref 12–15.9)
HGB UR QL STRIP.AUTO: ABNORMAL
HYALINE CASTS UR QL AUTO: ABNORMAL /LPF
IMM GRANULOCYTES # BLD AUTO: 0.1 10*3/MM3 (ref 0–0.05)
IMM GRANULOCYTES NFR BLD AUTO: 0.7 % (ref 0–0.5)
INR PPP: 1.65 (ref 0.9–1.1)
KETONES UR QL STRIP: NEGATIVE
LEUKOCYTE ESTERASE UR QL STRIP.AUTO: ABNORMAL
LYMPHOCYTES # BLD AUTO: 1.01 10*3/MM3 (ref 0.7–3.1)
LYMPHOCYTES NFR BLD AUTO: 7.4 % (ref 19.6–45.3)
MAGNESIUM SERPL-MCNC: 1.9 MG/DL (ref 1.6–2.6)
MCH RBC QN AUTO: 27.1 PG (ref 26.6–33)
MCH RBC QN AUTO: 27.3 PG (ref 26.6–33)
MCHC RBC AUTO-ENTMCNC: 31.4 G/DL (ref 31.5–35.7)
MCHC RBC AUTO-ENTMCNC: 32.1 G/DL (ref 31.5–35.7)
MCV RBC AUTO: 85 FL (ref 79–97)
MCV RBC AUTO: 86.4 FL (ref 79–97)
MONOCYTES # BLD AUTO: 0.66 10*3/MM3 (ref 0.1–0.9)
MONOCYTES NFR BLD AUTO: 4.9 % (ref 5–12)
NEUTROPHILS NFR BLD AUTO: 11.7 10*3/MM3 (ref 1.7–7)
NEUTROPHILS NFR BLD AUTO: 86.1 % (ref 42.7–76)
NITRITE UR QL STRIP: NEGATIVE
NRBC BLD AUTO-RTO: 0 /100 WBC (ref 0–0.2)
PH UR STRIP.AUTO: 7 [PH] (ref 5–8)
PLATELET # BLD AUTO: 183 10*3/MM3 (ref 140–450)
PLATELET # BLD AUTO: 197 10*3/MM3 (ref 140–450)
PMV BLD AUTO: 10.7 FL (ref 6–12)
PMV BLD AUTO: 11 FL (ref 6–12)
POTASSIUM SERPL-SCNC: 5.3 MMOL/L (ref 3.5–5.2)
POTASSIUM SERPL-SCNC: 5.7 MMOL/L (ref 3.5–5.2)
PROCALCITONIN SERPL-MCNC: 0.21 NG/ML (ref 0–0.25)
PROT SERPL-MCNC: 6.9 G/DL (ref 6–8.5)
PROT UR QL STRIP: ABNORMAL
PROTHROMBIN TIME: 19.7 SECONDS (ref 11.7–14.2)
QT INTERVAL: 422 MS
RBC # BLD AUTO: 3.26 10*6/MM3 (ref 3.77–5.28)
RBC # BLD AUTO: 3.91 10*6/MM3 (ref 3.77–5.28)
RBC # UR STRIP: ABNORMAL /HPF
REF LAB TEST METHOD: ABNORMAL
SARS-COV-2 RNA RESP QL NAA+PROBE: NOT DETECTED
SODIUM SERPL-SCNC: 131 MMOL/L (ref 136–145)
SODIUM SERPL-SCNC: 132 MMOL/L (ref 136–145)
SP GR UR STRIP: 1.02 (ref 1–1.03)
SQUAMOUS #/AREA URNS HPF: ABNORMAL /HPF
T4 FREE SERPL-MCNC: 1.15 NG/DL (ref 0.93–1.7)
TROPONIN T SERPL-MCNC: 0.13 NG/ML (ref 0–0.03)
TSH SERPL DL<=0.05 MIU/L-ACNC: 25.2 UIU/ML (ref 0.27–4.2)
UROBILINOGEN UR QL STRIP: ABNORMAL
WBC # UR STRIP: ABNORMAL /HPF
WBC NRBC COR # BLD: 10.4 10*3/MM3 (ref 3.4–10.8)
WBC NRBC COR # BLD: 13.59 10*3/MM3 (ref 3.4–10.8)

## 2022-11-09 PROCEDURE — 85610 PROTHROMBIN TIME: CPT | Performed by: EMERGENCY MEDICINE

## 2022-11-09 PROCEDURE — 99221 1ST HOSP IP/OBS SF/LOW 40: CPT | Performed by: PSYCHIATRY & NEUROLOGY

## 2022-11-09 PROCEDURE — 87086 URINE CULTURE/COLONY COUNT: CPT | Performed by: EMERGENCY MEDICINE

## 2022-11-09 PROCEDURE — 99222 1ST HOSP IP/OBS MODERATE 55: CPT | Performed by: NURSE PRACTITIONER

## 2022-11-09 PROCEDURE — 36415 COLL VENOUS BLD VENIPUNCTURE: CPT

## 2022-11-09 PROCEDURE — 94640 AIRWAY INHALATION TREATMENT: CPT

## 2022-11-09 PROCEDURE — 85027 COMPLETE CBC AUTOMATED: CPT | Performed by: NURSE PRACTITIONER

## 2022-11-09 PROCEDURE — 84484 ASSAY OF TROPONIN QUANT: CPT | Performed by: EMERGENCY MEDICINE

## 2022-11-09 PROCEDURE — 82962 GLUCOSE BLOOD TEST: CPT

## 2022-11-09 PROCEDURE — 25010000002 ONDANSETRON PER 1 MG: Performed by: NURSE PRACTITIONER

## 2022-11-09 PROCEDURE — 83735 ASSAY OF MAGNESIUM: CPT | Performed by: EMERGENCY MEDICINE

## 2022-11-09 PROCEDURE — 63710000001 INSULIN REGULAR HUMAN PER 5 UNITS: Performed by: EMERGENCY MEDICINE

## 2022-11-09 PROCEDURE — 85025 COMPLETE CBC W/AUTO DIFF WBC: CPT | Performed by: EMERGENCY MEDICINE

## 2022-11-09 PROCEDURE — 93010 ELECTROCARDIOGRAM REPORT: CPT | Performed by: INTERNAL MEDICINE

## 2022-11-09 PROCEDURE — 25010000002 CEFTRIAXONE PER 250 MG: Performed by: EMERGENCY MEDICINE

## 2022-11-09 PROCEDURE — 84439 ASSAY OF FREE THYROXINE: CPT | Performed by: EMERGENCY MEDICINE

## 2022-11-09 PROCEDURE — 63710000001 ONDANSETRON PER 8 MG: Performed by: NURSE PRACTITIONER

## 2022-11-09 PROCEDURE — 73700 CT LOWER EXTREMITY W/O DYE: CPT

## 2022-11-09 PROCEDURE — 5A1D70Z PERFORMANCE OF URINARY FILTRATION, INTERMITTENT, LESS THAN 6 HOURS PER DAY: ICD-10-PCS | Performed by: HOSPITALIST

## 2022-11-09 PROCEDURE — 84145 PROCALCITONIN (PCT): CPT | Performed by: NURSE PRACTITIONER

## 2022-11-09 PROCEDURE — U0003 INFECTIOUS AGENT DETECTION BY NUCLEIC ACID (DNA OR RNA); SEVERE ACUTE RESPIRATORY SYNDROME CORONAVIRUS 2 (SARS-COV-2) (CORONAVIRUS DISEASE [COVID-19]), AMPLIFIED PROBE TECHNIQUE, MAKING USE OF HIGH THROUGHPUT TECHNOLOGIES AS DESCRIBED BY CMS-2020-01-R: HCPCS | Performed by: EMERGENCY MEDICINE

## 2022-11-09 PROCEDURE — 82550 ASSAY OF CK (CPK): CPT | Performed by: EMERGENCY MEDICINE

## 2022-11-09 PROCEDURE — 80053 COMPREHEN METABOLIC PANEL: CPT | Performed by: EMERGENCY MEDICINE

## 2022-11-09 PROCEDURE — 83036 HEMOGLOBIN GLYCOSYLATED A1C: CPT | Performed by: NURSE PRACTITIONER

## 2022-11-09 PROCEDURE — 81001 URINALYSIS AUTO W/SCOPE: CPT | Performed by: EMERGENCY MEDICINE

## 2022-11-09 PROCEDURE — 63710000001 INSULIN LISPRO (HUMAN) PER 5 UNITS: Performed by: NURSE PRACTITIONER

## 2022-11-09 PROCEDURE — 84443 ASSAY THYROID STIM HORMONE: CPT | Performed by: EMERGENCY MEDICINE

## 2022-11-09 RX ORDER — DEXTROSE MONOHYDRATE 25 G/50ML
25 INJECTION, SOLUTION INTRAVENOUS
Status: DISCONTINUED | OUTPATIENT
Start: 2022-11-09 | End: 2022-11-12 | Stop reason: SDUPTHER

## 2022-11-09 RX ORDER — DILTIAZEM HYDROCHLORIDE 180 MG/1
180 CAPSULE, COATED, EXTENDED RELEASE ORAL
Status: DISCONTINUED | OUTPATIENT
Start: 2022-11-09 | End: 2022-11-13

## 2022-11-09 RX ORDER — ONDANSETRON 2 MG/ML
4 INJECTION INTRAMUSCULAR; INTRAVENOUS EVERY 6 HOURS PRN
Status: DISCONTINUED | OUTPATIENT
Start: 2022-11-09 | End: 2022-11-24 | Stop reason: HOSPADM

## 2022-11-09 RX ORDER — ACETAMINOPHEN 325 MG/1
650 TABLET ORAL EVERY 4 HOURS PRN
Status: DISCONTINUED | OUTPATIENT
Start: 2022-11-09 | End: 2022-11-24 | Stop reason: HOSPADM

## 2022-11-09 RX ORDER — HEPARIN SODIUM 5000 [USP'U]/ML
40-80 INJECTION, SOLUTION INTRAVENOUS; SUBCUTANEOUS EVERY 6 HOURS PRN
Status: DISCONTINUED | OUTPATIENT
Start: 2022-11-09 | End: 2022-11-09

## 2022-11-09 RX ORDER — SODIUM CHLORIDE 0.9 % (FLUSH) 0.9 %
10 SYRINGE (ML) INJECTION AS NEEDED
Status: DISCONTINUED | OUTPATIENT
Start: 2022-11-09 | End: 2022-11-24 | Stop reason: HOSPADM

## 2022-11-09 RX ORDER — HEPARIN SODIUM 10000 [USP'U]/100ML
18 INJECTION, SOLUTION INTRAVENOUS
Status: DISCONTINUED | OUTPATIENT
Start: 2022-11-09 | End: 2022-11-09

## 2022-11-09 RX ORDER — PANTOPRAZOLE SODIUM 40 MG/1
40 TABLET, DELAYED RELEASE ORAL DAILY
Status: DISCONTINUED | OUTPATIENT
Start: 2022-11-09 | End: 2022-11-24 | Stop reason: HOSPADM

## 2022-11-09 RX ORDER — ALBUMIN (HUMAN) 12.5 G/50ML
12.5 SOLUTION INTRAVENOUS AS NEEDED
Status: ACTIVE | OUTPATIENT
Start: 2022-11-09 | End: 2022-11-10

## 2022-11-09 RX ORDER — HYDROCODONE BITARTRATE AND ACETAMINOPHEN 7.5; 325 MG/1; MG/1
1 TABLET ORAL ONCE
Status: COMPLETED | OUTPATIENT
Start: 2022-11-09 | End: 2022-11-09

## 2022-11-09 RX ORDER — POLYETHYLENE GLYCOL 3350 17 G/17G
17 POWDER, FOR SOLUTION ORAL DAILY PRN
Status: DISCONTINUED | OUTPATIENT
Start: 2022-11-09 | End: 2022-11-24 | Stop reason: HOSPADM

## 2022-11-09 RX ORDER — HYDROCODONE BITARTRATE AND ACETAMINOPHEN 5; 325 MG/1; MG/1
1 TABLET ORAL EVERY 6 HOURS PRN
Status: DISCONTINUED | OUTPATIENT
Start: 2022-11-09 | End: 2022-11-09

## 2022-11-09 RX ORDER — CARVEDILOL 25 MG/1
25 TABLET ORAL 2 TIMES DAILY WITH MEALS
Status: DISCONTINUED | OUTPATIENT
Start: 2022-11-09 | End: 2022-11-13

## 2022-11-09 RX ORDER — UREA 10 %
3 LOTION (ML) TOPICAL NIGHTLY
Status: DISCONTINUED | OUTPATIENT
Start: 2022-11-09 | End: 2022-11-24 | Stop reason: HOSPADM

## 2022-11-09 RX ORDER — LEVOTHYROXINE SODIUM 0.12 MG/1
250 TABLET ORAL
Status: DISCONTINUED | OUTPATIENT
Start: 2022-11-10 | End: 2022-11-13

## 2022-11-09 RX ORDER — HYDROCODONE BITARTRATE AND ACETAMINOPHEN 7.5; 325 MG/1; MG/1
1 TABLET ORAL EVERY 6 HOURS PRN
Status: DISCONTINUED | OUTPATIENT
Start: 2022-11-09 | End: 2022-11-12

## 2022-11-09 RX ORDER — ROPINIROLE 1 MG/1
1 TABLET, FILM COATED ORAL NIGHTLY
Status: DISCONTINUED | OUTPATIENT
Start: 2022-11-09 | End: 2022-11-13

## 2022-11-09 RX ORDER — FOLIC ACID 1 MG/1
1 TABLET ORAL DAILY
Status: DISCONTINUED | OUTPATIENT
Start: 2022-11-09 | End: 2022-11-24 | Stop reason: HOSPADM

## 2022-11-09 RX ORDER — NICOTINE POLACRILEX 4 MG
15 LOZENGE BUCCAL
Status: DISCONTINUED | OUTPATIENT
Start: 2022-11-09 | End: 2022-11-12 | Stop reason: SDUPTHER

## 2022-11-09 RX ORDER — INSULIN LISPRO 100 [IU]/ML
0-9 INJECTION, SOLUTION INTRAVENOUS; SUBCUTANEOUS
Status: DISCONTINUED | OUTPATIENT
Start: 2022-11-09 | End: 2022-11-12

## 2022-11-09 RX ORDER — NITROGLYCERIN 0.4 MG/1
0.4 TABLET SUBLINGUAL
Status: DISCONTINUED | OUTPATIENT
Start: 2022-11-09 | End: 2022-11-24 | Stop reason: HOSPADM

## 2022-11-09 RX ORDER — ONDANSETRON 4 MG/1
4 TABLET, FILM COATED ORAL EVERY 6 HOURS PRN
Status: DISCONTINUED | OUTPATIENT
Start: 2022-11-09 | End: 2022-11-24 | Stop reason: HOSPADM

## 2022-11-09 RX ADMIN — INSULIN HUMAN 10 UNITS: 100 INJECTION, SOLUTION PARENTERAL at 02:29

## 2022-11-09 RX ADMIN — FOLIC ACID 1 MG: 1 TABLET ORAL at 21:36

## 2022-11-09 RX ADMIN — ROPINIROLE 1 MG: 1 TABLET, FILM COATED ORAL at 21:37

## 2022-11-09 RX ADMIN — PANTOPRAZOLE SODIUM 40 MG: 40 TABLET, DELAYED RELEASE ORAL at 21:35

## 2022-11-09 RX ADMIN — HYDROCODONE BITARTRATE AND ACETAMINOPHEN 1 TABLET: 7.5; 325 TABLET ORAL at 18:39

## 2022-11-09 RX ADMIN — HYDROCODONE BITARTRATE AND ACETAMINOPHEN 1 TABLET: 7.5; 325 TABLET ORAL at 04:01

## 2022-11-09 RX ADMIN — Medication 3 MG: at 23:26

## 2022-11-09 RX ADMIN — ONDANSETRON 4 MG: 2 INJECTION INTRAMUSCULAR; INTRAVENOUS at 13:06

## 2022-11-09 RX ADMIN — ONDANSETRON HYDROCHLORIDE 4 MG: 4 TABLET, FILM COATED ORAL at 18:50

## 2022-11-09 RX ADMIN — CEFTRIAXONE SODIUM 1 G: 1 INJECTION, POWDER, FOR SOLUTION INTRAMUSCULAR; INTRAVENOUS at 03:07

## 2022-11-09 RX ADMIN — CARVEDILOL 25 MG: 25 TABLET, FILM COATED ORAL at 18:50

## 2022-11-09 RX ADMIN — ALBUTEROL SULFATE 10 MG: 2.5 SOLUTION RESPIRATORY (INHALATION) at 03:13

## 2022-11-09 RX ADMIN — INSULIN GLARGINE-YFGN 15 UNITS: 100 INJECTION, SOLUTION SUBCUTANEOUS at 21:37

## 2022-11-09 RX ADMIN — SERTRALINE 150 MG: 100 TABLET, FILM COATED ORAL at 23:27

## 2022-11-09 RX ADMIN — DILTIAZEM HYDROCHLORIDE 180 MG: 180 CAPSULE, EXTENDED RELEASE ORAL at 21:35

## 2022-11-09 RX ADMIN — HYDROCODONE BITARTRATE AND ACETAMINOPHEN 1 TABLET: 5; 325 TABLET ORAL at 10:02

## 2022-11-09 RX ADMIN — CEFTRIAXONE SODIUM 1 G: 1 INJECTION, POWDER, FOR SOLUTION INTRAMUSCULAR; INTRAVENOUS at 23:47

## 2022-11-09 RX ADMIN — INSULIN LISPRO 2 UNITS: 100 INJECTION, SOLUTION INTRAVENOUS; SUBCUTANEOUS at 10:02

## 2022-11-09 NOTE — PROGRESS NOTES
Enter Query Response Below      Query Response:         Hyponatremia-clinically significant     If applicable, please update the problem list.     Patient: Zaina Martinez        : 1965  Account: [id number]           Admit Date: 10/26/2022        How to Respond to this query:       a. Click New Note     b. Answer query within the yellow box.                c. Update the Problem List, if applicable.      If you have any questions about this query contact me at: [email or web address]     Dr. King,    Patient is diagnosed with hypervolemic hyponatremia.  Daily labs show:          Sodium  Glucose  10/26   130   176  10/27   130   250  10/28   129   385  10/29   129   179  10/30   130   288  10/31   131   181       130   235  Treatment included dialysis.    Please specify the following regarding hyponatremia:    Hyponatremia-clinically significant  Hyponatremia-clinically insignificant  Pseudohyponatremia only  Other (please specify)_______  Unable to determine    By submitting this query, we are merely seeking further clarification of documentation to accurately reflect all conditions that you are monitoring, evaluating, treating or that extend the hospitalization or utilize additional resources of care. Please utilize your independent clinical judgment when addressing the question(s) above.     This query and your response, once completed, will be entered into the legal medical record.    Sincerely,  Essie QUINTANILLA, RN  Clinical Documentation Integrity Program   [email or web address]

## 2022-11-09 NOTE — OUTREACH NOTE
Medical Week 2 Survey    Flowsheet Row Responses   [facility] [place] patient discharged from? [place]   Does the patient have one of the following disease processes/diagnoses(primary or secondary)? Other   Week 2 attempt successful? No   Unsuccessful attempts Attempt 1   Revoke Readmitted          JOSELYN LUCERO - Registered Nurse

## 2022-11-09 NOTE — PROGRESS NOTES
"Enter Query Response Below      Query Response:       acute pulmonary edema due to volume overload related to dialysis non-compliance        If applicable, please update the problem list.     Patient: Zaina Martinez        : 1965  Account: [id number]           Admit Date: 10/26/2022        How to Respond to this query:       a. Click New Note     b. Answer query within the yellow box.                c. Update the Problem List, if applicable.      If you have any questions about this query contact me at: [email or web address]     Dr. King,    55 yo female with past medical history of \"acute on chronic diastolic CHF, ESRD, HTN, and medical noncompliance. admitted (10/26/22) with volume overload, acute hypoxic respiratory failure. Missed dialysis. Also noted, \"Orthopnea worse than usual, JACKSON, non-compliance with fluid restriction and meds, malaise.\" Physical exam:  no JVD, no edema. proBNP > 70,000.0. However, CXR with impression of \"volume overload w/ interstitial edema and bilateral pleural effusions.\" Documentation included \"Hypertension with ESRD, exacerbated by volume overload,\" as well as, \"acute pulmonary edema, present on admission.\" The patient was continued on home po Coreg. Hospital course noted, \"She frequently misses dialysis and is multiple admissions to our facility to our group for volume overload... she was dialyzed aggressively and her oxygen status improved.\" Also noted, \"ANJEL with EF 55%.\"    Please specify the following:    *acute pulmonary edema due to volume overload related to dialysis non-compliance   *acute pulmonary edema due to acute on chronic diastolic heart failure related to dialysis non-compliance  *other _________  *unable to determine    By submitting this query, we are merely seeking further clarification of documentation to accurately reflect all conditions that you are monitoring, evaluating, treating or that extend the hospitalization or utilize additional resources of " care. Please utilize your independent clinical judgment when addressing the question(s) above.     This query and your response, once completed, will be entered into the legal medical record.    Sincerely,  Essie QUINTANILLA, RN  Clinical Documentation Integrity Program   [email or web address]

## 2022-11-10 ENCOUNTER — ANESTHESIA (OUTPATIENT)
Dept: PERIOP | Facility: HOSPITAL | Age: 57
End: 2022-11-10

## 2022-11-10 ENCOUNTER — ANESTHESIA EVENT (OUTPATIENT)
Dept: PERIOP | Facility: HOSPITAL | Age: 57
End: 2022-11-10

## 2022-11-10 ENCOUNTER — APPOINTMENT (OUTPATIENT)
Dept: GENERAL RADIOLOGY | Facility: HOSPITAL | Age: 57
End: 2022-11-10

## 2022-11-10 LAB
ALBUMIN SERPL-MCNC: 3 G/DL (ref 3.5–5.2)
ALBUMIN SERPL-MCNC: 3 G/DL (ref 3.5–5.2)
ANION GAP SERPL CALCULATED.3IONS-SCNC: 11 MMOL/L (ref 5–15)
ANION GAP SERPL CALCULATED.3IONS-SCNC: 7 MMOL/L (ref 5–15)
BACTERIA SPEC AEROBE CULT: NO GROWTH
BUN SERPL-MCNC: 14 MG/DL (ref 6–20)
BUN SERPL-MCNC: 16 MG/DL (ref 6–20)
BUN/CREAT SERPL: 5.9 (ref 7–25)
BUN/CREAT SERPL: 6 (ref 7–25)
CALCIUM SPEC-SCNC: 8.2 MG/DL (ref 8.6–10.5)
CALCIUM SPEC-SCNC: 8.7 MG/DL (ref 8.6–10.5)
CHLORIDE SERPL-SCNC: 100 MMOL/L (ref 98–107)
CHLORIDE SERPL-SCNC: 101 MMOL/L (ref 98–107)
CO2 SERPL-SCNC: 24 MMOL/L (ref 22–29)
CO2 SERPL-SCNC: 25 MMOL/L (ref 22–29)
CREAT SERPL-MCNC: 2.36 MG/DL (ref 0.57–1)
CREAT SERPL-MCNC: 2.65 MG/DL (ref 0.57–1)
DEPRECATED RDW RBC AUTO: 44.9 FL (ref 37–54)
EGFRCR SERPLBLD CKD-EPI 2021: 20.4 ML/MIN/1.73
EGFRCR SERPLBLD CKD-EPI 2021: 23.5 ML/MIN/1.73
ERYTHROCYTE [DISTWIDTH] IN BLOOD BY AUTOMATED COUNT: 14.7 % (ref 12.3–15.4)
FERRITIN SERPL-MCNC: 609 NG/ML (ref 13–150)
GLUCOSE BLDC GLUCOMTR-MCNC: 111 MG/DL (ref 70–130)
GLUCOSE BLDC GLUCOMTR-MCNC: 116 MG/DL (ref 70–130)
GLUCOSE BLDC GLUCOMTR-MCNC: 118 MG/DL (ref 70–130)
GLUCOSE BLDC GLUCOMTR-MCNC: 184 MG/DL (ref 70–130)
GLUCOSE BLDC GLUCOMTR-MCNC: 57 MG/DL (ref 70–130)
GLUCOSE BLDC GLUCOMTR-MCNC: 84 MG/DL (ref 70–130)
GLUCOSE BLDC GLUCOMTR-MCNC: 87 MG/DL (ref 70–130)
GLUCOSE SERPL-MCNC: 101 MG/DL (ref 65–99)
GLUCOSE SERPL-MCNC: 117 MG/DL (ref 65–99)
HCT VFR BLD AUTO: 29.7 % (ref 34–46.6)
HGB BLD-MCNC: 9.8 G/DL (ref 12–15.9)
INR PPP: 1.23 (ref 0.9–1.1)
IRON 24H UR-MRATE: 31 MCG/DL (ref 37–145)
IRON SATN MFR SERPL: 13 % (ref 20–50)
MCH RBC QN AUTO: 27.5 PG (ref 26.6–33)
MCHC RBC AUTO-ENTMCNC: 33 G/DL (ref 31.5–35.7)
MCV RBC AUTO: 83.2 FL (ref 79–97)
PHOSPHATE SERPL-MCNC: 3.1 MG/DL (ref 2.5–4.5)
PHOSPHATE SERPL-MCNC: 4.1 MG/DL (ref 2.5–4.5)
PLATELET # BLD AUTO: 173 10*3/MM3 (ref 140–450)
PMV BLD AUTO: 10.8 FL (ref 6–12)
POTASSIUM SERPL-SCNC: 4.9 MMOL/L (ref 3.5–5.2)
POTASSIUM SERPL-SCNC: 5.4 MMOL/L (ref 3.5–5.2)
PROTHROMBIN TIME: 15.7 SECONDS (ref 11.7–14.2)
QT INTERVAL: 435 MS
RBC # BLD AUTO: 3.57 10*6/MM3 (ref 3.77–5.28)
SODIUM SERPL-SCNC: 133 MMOL/L (ref 136–145)
SODIUM SERPL-SCNC: 135 MMOL/L (ref 136–145)
TIBC SERPL-MCNC: 238 MCG/DL (ref 298–536)
TRANSFERRIN SERPL-MCNC: 160 MG/DL (ref 200–360)
TROPONIN T SERPL-MCNC: 0.21 NG/ML (ref 0–0.03)
WBC NRBC COR # BLD: 10.05 10*3/MM3 (ref 3.4–10.8)

## 2022-11-10 PROCEDURE — 85027 COMPLETE CBC AUTOMATED: CPT | Performed by: HOSPITALIST

## 2022-11-10 PROCEDURE — 83540 ASSAY OF IRON: CPT | Performed by: HOSPITALIST

## 2022-11-10 PROCEDURE — C1713 ANCHOR/SCREW BN/BN,TIS/BN: HCPCS | Performed by: ORTHOPAEDIC SURGERY

## 2022-11-10 PROCEDURE — 25010000002 ONDANSETRON PER 1 MG: Performed by: NURSE ANESTHETIST, CERTIFIED REGISTERED

## 2022-11-10 PROCEDURE — 25010000002 CEFTRIAXONE PER 250 MG: Performed by: NURSE PRACTITIONER

## 2022-11-10 PROCEDURE — 25010000002 PROPOFOL 10 MG/ML EMULSION: Performed by: ANESTHESIOLOGY

## 2022-11-10 PROCEDURE — 25010000002 VANCOMYCIN 750 MG RECONSTITUTED SOLUTION: Performed by: ORTHOPAEDIC SURGERY

## 2022-11-10 PROCEDURE — 82962 GLUCOSE BLOOD TEST: CPT

## 2022-11-10 PROCEDURE — 99223 1ST HOSP IP/OBS HIGH 75: CPT | Performed by: STUDENT IN AN ORGANIZED HEALTH CARE EDUCATION/TRAINING PROGRAM

## 2022-11-10 PROCEDURE — 84466 ASSAY OF TRANSFERRIN: CPT | Performed by: HOSPITALIST

## 2022-11-10 PROCEDURE — 82728 ASSAY OF FERRITIN: CPT | Performed by: HOSPITALIST

## 2022-11-10 PROCEDURE — 85610 PROTHROMBIN TIME: CPT | Performed by: HOSPITALIST

## 2022-11-10 PROCEDURE — 84484 ASSAY OF TROPONIN QUANT: CPT | Performed by: HOSPITALIST

## 2022-11-10 PROCEDURE — 25010000002 HYDROMORPHONE PER 4 MG: Performed by: ANESTHESIOLOGY

## 2022-11-10 PROCEDURE — 25010000002 MORPHINE PER 10 MG: Performed by: NURSE PRACTITIONER

## 2022-11-10 PROCEDURE — 80069 RENAL FUNCTION PANEL: CPT | Performed by: HOSPITALIST

## 2022-11-10 PROCEDURE — 93010 ELECTROCARDIOGRAM REPORT: CPT | Performed by: INTERNAL MEDICINE

## 2022-11-10 PROCEDURE — 80069 RENAL FUNCTION PANEL: CPT | Performed by: ORTHOPAEDIC SURGERY

## 2022-11-10 PROCEDURE — 25010000002 PHENYLEPHRINE 10 MG/ML SOLUTION: Performed by: ANESTHESIOLOGY

## 2022-11-10 PROCEDURE — 25010000002 FENTANYL CITRATE (PF) 50 MCG/ML SOLUTION: Performed by: NURSE ANESTHETIST, CERTIFIED REGISTERED

## 2022-11-10 PROCEDURE — 76000 FLUOROSCOPY <1 HR PHYS/QHP: CPT

## 2022-11-10 PROCEDURE — 25010000002 CEFAZOLIN IN DEXTROSE 2-4 GM/100ML-% SOLUTION: Performed by: ORTHOPAEDIC SURGERY

## 2022-11-10 PROCEDURE — 73502 X-RAY EXAM HIP UNI 2-3 VIEWS: CPT

## 2022-11-10 PROCEDURE — 25010000002 FENTANYL CITRATE (PF) 50 MCG/ML SOLUTION: Performed by: ANESTHESIOLOGY

## 2022-11-10 PROCEDURE — 25010000002 MORPHINE PER 10 MG: Performed by: ORTHOPAEDIC SURGERY

## 2022-11-10 PROCEDURE — 99232 SBSQ HOSP IP/OBS MODERATE 35: CPT | Performed by: INTERNAL MEDICINE

## 2022-11-10 PROCEDURE — 73501 X-RAY EXAM HIP UNI 1 VIEW: CPT

## 2022-11-10 PROCEDURE — 93005 ELECTROCARDIOGRAM TRACING: CPT | Performed by: ANESTHESIOLOGY

## 2022-11-10 PROCEDURE — 0QS606Z REPOSITION RIGHT UPPER FEMUR WITH INTRAMEDULLARY INTERNAL FIXATION DEVICE, OPEN APPROACH: ICD-10-PCS | Performed by: ORTHOPAEDIC SURGERY

## 2022-11-10 DEVICE — PRECISION PIN
Type: IMPLANTABLE DEVICE | Site: FEMUR | Status: FUNCTIONAL
Brand: GAMMA

## 2022-11-10 DEVICE — TROCHANTERIC NAIL
Type: IMPLANTABLE DEVICE | Site: FEMUR | Status: FUNCTIONAL
Brand: GAMMA

## 2022-11-10 DEVICE — LOCKING SCREW
Type: IMPLANTABLE DEVICE | Site: FEMUR | Status: FUNCTIONAL
Brand: T2 ALPHA

## 2022-11-10 DEVICE — K-WIRE: Type: IMPLANTABLE DEVICE | Site: FEMUR | Status: FUNCTIONAL

## 2022-11-10 DEVICE — LAG SCREW
Type: IMPLANTABLE DEVICE | Site: FEMUR | Status: FUNCTIONAL
Brand: GAMMA

## 2022-11-10 RX ORDER — PROPOFOL 10 MG/ML
VIAL (ML) INTRAVENOUS AS NEEDED
Status: DISCONTINUED | OUTPATIENT
Start: 2022-11-10 | End: 2022-11-10 | Stop reason: SURG

## 2022-11-10 RX ORDER — LABETALOL HYDROCHLORIDE 5 MG/ML
5 INJECTION, SOLUTION INTRAVENOUS
Status: DISCONTINUED | OUTPATIENT
Start: 2022-11-10 | End: 2022-11-10 | Stop reason: HOSPADM

## 2022-11-10 RX ORDER — CEFAZOLIN SODIUM 2 G/100ML
2 INJECTION, SOLUTION INTRAVENOUS ONCE
Status: COMPLETED | OUTPATIENT
Start: 2022-11-10 | End: 2022-11-10

## 2022-11-10 RX ORDER — MIDAZOLAM HYDROCHLORIDE 1 MG/ML
1 INJECTION INTRAMUSCULAR; INTRAVENOUS
Status: DISCONTINUED | OUTPATIENT
Start: 2022-11-10 | End: 2022-11-10 | Stop reason: HOSPADM

## 2022-11-10 RX ORDER — DEXTROSE MONOHYDRATE 25 G/50ML
50 INJECTION, SOLUTION INTRAVENOUS ONCE
Status: COMPLETED | OUTPATIENT
Start: 2022-11-10 | End: 2022-11-10

## 2022-11-10 RX ORDER — FENTANYL CITRATE 50 UG/ML
50 INJECTION, SOLUTION INTRAMUSCULAR; INTRAVENOUS
Status: DISCONTINUED | OUTPATIENT
Start: 2022-11-10 | End: 2022-11-10 | Stop reason: HOSPADM

## 2022-11-10 RX ORDER — HYDROMORPHONE HYDROCHLORIDE 1 MG/ML
0.5 INJECTION, SOLUTION INTRAMUSCULAR; INTRAVENOUS; SUBCUTANEOUS
Status: DISCONTINUED | OUTPATIENT
Start: 2022-11-10 | End: 2022-11-10 | Stop reason: HOSPADM

## 2022-11-10 RX ORDER — MAGNESIUM HYDROXIDE 1200 MG/15ML
LIQUID ORAL AS NEEDED
Status: DISCONTINUED | OUTPATIENT
Start: 2022-11-10 | End: 2022-11-10 | Stop reason: HOSPADM

## 2022-11-10 RX ORDER — PROMETHAZINE HYDROCHLORIDE 25 MG/1
25 TABLET ORAL ONCE AS NEEDED
Status: DISCONTINUED | OUTPATIENT
Start: 2022-11-10 | End: 2022-11-10 | Stop reason: HOSPADM

## 2022-11-10 RX ORDER — PHENYLEPHRINE HYDROCHLORIDE 10 MG/ML
INJECTION INTRAVENOUS AS NEEDED
Status: DISCONTINUED | OUTPATIENT
Start: 2022-11-10 | End: 2022-11-10 | Stop reason: SURG

## 2022-11-10 RX ORDER — EPHEDRINE SULFATE 50 MG/ML
INJECTION, SOLUTION INTRAVENOUS AS NEEDED
Status: DISCONTINUED | OUTPATIENT
Start: 2022-11-10 | End: 2022-11-10 | Stop reason: SURG

## 2022-11-10 RX ORDER — DIPHENHYDRAMINE HYDROCHLORIDE 50 MG/ML
12.5 INJECTION INTRAMUSCULAR; INTRAVENOUS
Status: DISCONTINUED | OUTPATIENT
Start: 2022-11-10 | End: 2022-11-10 | Stop reason: HOSPADM

## 2022-11-10 RX ORDER — ONDANSETRON 2 MG/ML
INJECTION INTRAMUSCULAR; INTRAVENOUS AS NEEDED
Status: DISCONTINUED | OUTPATIENT
Start: 2022-11-10 | End: 2022-11-10 | Stop reason: SURG

## 2022-11-10 RX ORDER — DIPHENHYDRAMINE HCL 25 MG
25 CAPSULE ORAL
Status: DISCONTINUED | OUTPATIENT
Start: 2022-11-10 | End: 2022-11-10 | Stop reason: HOSPADM

## 2022-11-10 RX ORDER — FAMOTIDINE 10 MG/ML
20 INJECTION, SOLUTION INTRAVENOUS ONCE
Status: COMPLETED | OUTPATIENT
Start: 2022-11-10 | End: 2022-11-10

## 2022-11-10 RX ORDER — HYDRALAZINE HYDROCHLORIDE 20 MG/ML
5 INJECTION INTRAMUSCULAR; INTRAVENOUS
Status: DISCONTINUED | OUTPATIENT
Start: 2022-11-10 | End: 2022-11-10 | Stop reason: HOSPADM

## 2022-11-10 RX ORDER — MORPHINE SULFATE 1 MG/ML
2 INJECTION, SOLUTION EPIDURAL; INTRATHECAL; INTRAVENOUS ONCE
Status: DISCONTINUED | OUTPATIENT
Start: 2022-11-10 | End: 2022-11-10 | Stop reason: HOSPADM

## 2022-11-10 RX ORDER — MANNITOL 250 MG/ML
25 INJECTION, SOLUTION INTRAVENOUS AS NEEDED
Status: DISPENSED | OUTPATIENT
Start: 2022-11-10 | End: 2022-11-11

## 2022-11-10 RX ORDER — SODIUM CHLORIDE 9 MG/ML
INJECTION, SOLUTION INTRAVENOUS CONTINUOUS PRN
Status: DISCONTINUED | OUTPATIENT
Start: 2022-11-10 | End: 2022-11-10 | Stop reason: SURG

## 2022-11-10 RX ORDER — SODIUM CHLORIDE 0.9 % (FLUSH) 0.9 %
3 SYRINGE (ML) INJECTION EVERY 12 HOURS SCHEDULED
Status: DISCONTINUED | OUTPATIENT
Start: 2022-11-10 | End: 2022-11-10 | Stop reason: HOSPADM

## 2022-11-10 RX ORDER — CEFAZOLIN SODIUM 2 G/100ML
2 INJECTION, SOLUTION INTRAVENOUS EVERY 8 HOURS
Status: COMPLETED | OUTPATIENT
Start: 2022-11-10 | End: 2022-11-11

## 2022-11-10 RX ORDER — CALCIUM CHLORIDE 100 MG/ML
INJECTION INTRAVENOUS; INTRAVENTRICULAR AS NEEDED
Status: DISCONTINUED | OUTPATIENT
Start: 2022-11-10 | End: 2022-11-10 | Stop reason: SURG

## 2022-11-10 RX ORDER — EPHEDRINE SULFATE 50 MG/ML
5 INJECTION, SOLUTION INTRAVENOUS ONCE AS NEEDED
Status: DISCONTINUED | OUTPATIENT
Start: 2022-11-10 | End: 2022-11-10 | Stop reason: HOSPADM

## 2022-11-10 RX ORDER — NALOXONE HCL 0.4 MG/ML
0.2 VIAL (ML) INJECTION AS NEEDED
Status: DISCONTINUED | OUTPATIENT
Start: 2022-11-10 | End: 2022-11-10 | Stop reason: HOSPADM

## 2022-11-10 RX ORDER — OXYCODONE AND ACETAMINOPHEN 7.5; 325 MG/1; MG/1
1 TABLET ORAL EVERY 4 HOURS PRN
Status: DISCONTINUED | OUTPATIENT
Start: 2022-11-10 | End: 2022-11-10 | Stop reason: HOSPADM

## 2022-11-10 RX ORDER — SODIUM CHLORIDE, SODIUM LACTATE, POTASSIUM CHLORIDE, CALCIUM CHLORIDE 600; 310; 30; 20 MG/100ML; MG/100ML; MG/100ML; MG/100ML
9 INJECTION, SOLUTION INTRAVENOUS CONTINUOUS
Status: DISCONTINUED | OUTPATIENT
Start: 2022-11-10 | End: 2022-11-10

## 2022-11-10 RX ORDER — SODIUM CHLORIDE 9 MG/ML
9 INJECTION, SOLUTION INTRAVENOUS ONCE
Status: COMPLETED | OUTPATIENT
Start: 2022-11-10 | End: 2022-11-10

## 2022-11-10 RX ORDER — MORPHINE SULFATE 0.5 MG/ML
2 INJECTION, SOLUTION EPIDURAL; INTRATHECAL; INTRAVENOUS ONCE
Status: DISCONTINUED | OUTPATIENT
Start: 2022-11-10 | End: 2022-11-10 | Stop reason: HOSPADM

## 2022-11-10 RX ORDER — MORPHINE SULFATE 2 MG/ML
2 INJECTION, SOLUTION INTRAMUSCULAR; INTRAVENOUS
Status: DISCONTINUED | OUTPATIENT
Start: 2022-11-10 | End: 2022-11-13

## 2022-11-10 RX ORDER — ONDANSETRON 2 MG/ML
4 INJECTION INTRAMUSCULAR; INTRAVENOUS ONCE AS NEEDED
Status: DISCONTINUED | OUTPATIENT
Start: 2022-11-10 | End: 2022-11-10 | Stop reason: HOSPADM

## 2022-11-10 RX ORDER — SODIUM CHLORIDE 0.9 % (FLUSH) 0.9 %
3-10 SYRINGE (ML) INJECTION AS NEEDED
Status: DISCONTINUED | OUTPATIENT
Start: 2022-11-10 | End: 2022-11-10 | Stop reason: HOSPADM

## 2022-11-10 RX ORDER — FENTANYL CITRATE 50 UG/ML
INJECTION, SOLUTION INTRAMUSCULAR; INTRAVENOUS AS NEEDED
Status: DISCONTINUED | OUTPATIENT
Start: 2022-11-10 | End: 2022-11-10 | Stop reason: SURG

## 2022-11-10 RX ORDER — HYDROCODONE BITARTRATE AND ACETAMINOPHEN 7.5; 325 MG/1; MG/1
1 TABLET ORAL ONCE AS NEEDED
Status: DISCONTINUED | OUTPATIENT
Start: 2022-11-10 | End: 2022-11-10 | Stop reason: HOSPADM

## 2022-11-10 RX ORDER — LIDOCAINE HYDROCHLORIDE 10 MG/ML
0.5 INJECTION, SOLUTION EPIDURAL; INFILTRATION; INTRACAUDAL; PERINEURAL ONCE AS NEEDED
Status: DISCONTINUED | OUTPATIENT
Start: 2022-11-10 | End: 2022-11-10 | Stop reason: HOSPADM

## 2022-11-10 RX ORDER — FLUMAZENIL 0.1 MG/ML
0.2 INJECTION INTRAVENOUS AS NEEDED
Status: DISCONTINUED | OUTPATIENT
Start: 2022-11-10 | End: 2022-11-10 | Stop reason: HOSPADM

## 2022-11-10 RX ORDER — PROMETHAZINE HYDROCHLORIDE 25 MG/1
25 SUPPOSITORY RECTAL ONCE AS NEEDED
Status: DISCONTINUED | OUTPATIENT
Start: 2022-11-10 | End: 2022-11-10 | Stop reason: HOSPADM

## 2022-11-10 RX ADMIN — MORPHINE SULFATE 2 MG: 2 INJECTION, SOLUTION INTRAMUSCULAR; INTRAVENOUS at 11:32

## 2022-11-10 RX ADMIN — MICONAZOLE NITRATE 1 APPLICATION: 20 CREAM TOPICAL at 00:52

## 2022-11-10 RX ADMIN — FENTANYL CITRATE 25 MCG: 50 INJECTION, SOLUTION INTRAMUSCULAR; INTRAVENOUS at 13:36

## 2022-11-10 RX ADMIN — HYDROCODONE BITARTRATE AND ACETAMINOPHEN 1 TABLET: 7.5; 325 TABLET ORAL at 00:53

## 2022-11-10 RX ADMIN — HYDROMORPHONE HYDROCHLORIDE 0.5 MG: 1 INJECTION, SOLUTION INTRAMUSCULAR; INTRAVENOUS; SUBCUTANEOUS at 14:57

## 2022-11-10 RX ADMIN — HYDROCODONE BITARTRATE AND ACETAMINOPHEN 1 TABLET: 7.5; 325 TABLET ORAL at 18:09

## 2022-11-10 RX ADMIN — LEVOTHYROXINE SODIUM 250 MCG: 0.12 TABLET ORAL at 06:58

## 2022-11-10 RX ADMIN — MORPHINE SULFATE 2 MG: 2 INJECTION, SOLUTION INTRAMUSCULAR; INTRAVENOUS at 19:45

## 2022-11-10 RX ADMIN — CEFAZOLIN SODIUM 2 G: 2 INJECTION, SOLUTION INTRAVENOUS at 12:41

## 2022-11-10 RX ADMIN — SODIUM CHLORIDE: 900 INJECTION, SOLUTION INTRAVENOUS at 12:55

## 2022-11-10 RX ADMIN — PANTOPRAZOLE SODIUM 40 MG: 40 TABLET, DELAYED RELEASE ORAL at 08:18

## 2022-11-10 RX ADMIN — DEXTROSE MONOHYDRATE 50 ML: 25 INJECTION, SOLUTION INTRAVENOUS at 14:42

## 2022-11-10 RX ADMIN — CARVEDILOL 25 MG: 25 TABLET, FILM COATED ORAL at 08:18

## 2022-11-10 RX ADMIN — ONDANSETRON 4 MG: 2 INJECTION INTRAMUSCULAR; INTRAVENOUS at 13:41

## 2022-11-10 RX ADMIN — PHENYLEPHRINE HYDROCHLORIDE 200 MCG: 10 INJECTION, SOLUTION INTRAVENOUS at 13:03

## 2022-11-10 RX ADMIN — FOLIC ACID 1 MG: 1 TABLET ORAL at 08:18

## 2022-11-10 RX ADMIN — DILTIAZEM HYDROCHLORIDE 180 MG: 180 CAPSULE, EXTENDED RELEASE ORAL at 08:18

## 2022-11-10 RX ADMIN — HYDROCODONE BITARTRATE AND ACETAMINOPHEN 1 TABLET: 7.5; 325 TABLET ORAL at 06:58

## 2022-11-10 RX ADMIN — ROPINIROLE 1 MG: 1 TABLET, FILM COATED ORAL at 22:23

## 2022-11-10 RX ADMIN — EPHEDRINE SULFATE 50 MG: 50 INJECTION INTRAVENOUS at 13:03

## 2022-11-10 RX ADMIN — FAMOTIDINE 20 MG: 10 INJECTION INTRAVENOUS at 11:50

## 2022-11-10 RX ADMIN — PROPOFOL 140 MG: 10 INJECTION, EMULSION INTRAVENOUS at 12:56

## 2022-11-10 RX ADMIN — ACETAMINOPHEN 650 MG: 325 TABLET, FILM COATED ORAL at 03:54

## 2022-11-10 RX ADMIN — CEFAZOLIN SODIUM 2 G: 2 INJECTION, SOLUTION INTRAVENOUS at 22:23

## 2022-11-10 RX ADMIN — FENTANYL CITRATE 50 MCG: 50 INJECTION, SOLUTION INTRAMUSCULAR; INTRAVENOUS at 14:52

## 2022-11-10 RX ADMIN — CALCIUM CHLORIDE 1 G: 100 INJECTION, SOLUTION INTRAVENOUS at 13:02

## 2022-11-10 RX ADMIN — INSULIN GLARGINE-YFGN 4 UNITS: 100 INJECTION, SOLUTION SUBCUTANEOUS at 22:24

## 2022-11-10 RX ADMIN — SODIUM CHLORIDE 9 ML/HR: 9 INJECTION, SOLUTION INTRAVENOUS at 12:14

## 2022-11-10 NOTE — ANESTHESIA PROCEDURE NOTES
Airway  Urgency: elective    Date/Time: 11/10/2022 1:00 PM  End Time:11/10/2022 1:00 PM  Airway not difficult    General Information and Staff    Patient location during procedure: OR  Anesthesiologist: Nicho Hi MD    Indications and Patient Condition  Indications for airway management: airway protection    Preoxygenated: yes  Mask difficulty assessment: 0 - not attempted    Final Airway Details  Final airway type: supraglottic airway      Successful airway: classic  Size 4     Number of attempts at approach: 1  Assessment: lips, teeth, and gum same as pre-op and atraumatic intubation

## 2022-11-10 NOTE — ANESTHESIA PREPROCEDURE EVALUATION
Anesthesia Evaluation     Patient summary reviewed and Nursing notes reviewed   NPO Solid Status: > 8 hours             Airway   Mallampati: II  Dental - normal exam     Pulmonary - normal exam   (+) shortness of breath,   Cardiovascular - normal exam    ECG reviewed    (+) hypertension, past MI , CAD, dysrhythmias, CHF ,     ROS comment: Troponin of .212, Talked to Dr Constantino and he feels that it is chronically elevated and does not represent a true ischemic condition currently. Recommended proceeding with surgery    Neuro/Psych  GI/Hepatic/Renal/Endo    (+)   renal disease dialysis, diabetes mellitus poorly controlled using insulin,     ROS Comment: HD yesterday    Musculoskeletal         ROS comment: Femur fx  Abdominal    Substance History      OB/GYN          Other                        Anesthesia Plan    ASA 4     general       Anesthetic plan, risks, benefits, and alternatives have been provided, discussed and informed consent has been obtained with: patient.        CODE STATUS:    Code Status (Patient has no pulse and is not breathing): CPR (Attempt to Resuscitate)  Medical Interventions (Patient has pulse or is breathing): Full Support

## 2022-11-11 LAB
ALBUMIN SERPL-MCNC: 3.1 G/DL (ref 3.5–5.2)
ANION GAP SERPL CALCULATED.3IONS-SCNC: 8.4 MMOL/L (ref 5–15)
BASOPHILS # BLD AUTO: 0.04 10*3/MM3 (ref 0–0.2)
BASOPHILS NFR BLD AUTO: 0.3 % (ref 0–1.5)
BUN SERPL-MCNC: 17 MG/DL (ref 6–20)
BUN/CREAT SERPL: 5.7 (ref 7–25)
CALCIUM SPEC-SCNC: 8.7 MG/DL (ref 8.6–10.5)
CHLORIDE SERPL-SCNC: 103 MMOL/L (ref 98–107)
CO2 SERPL-SCNC: 23.6 MMOL/L (ref 22–29)
CREAT SERPL-MCNC: 2.97 MG/DL (ref 0.57–1)
DEPRECATED RDW RBC AUTO: 48.1 FL (ref 37–54)
EGFRCR SERPLBLD CKD-EPI 2021: 17.8 ML/MIN/1.73
EOSINOPHIL # BLD AUTO: 0.16 10*3/MM3 (ref 0–0.4)
EOSINOPHIL NFR BLD AUTO: 1.3 % (ref 0.3–6.2)
ERYTHROCYTE [DISTWIDTH] IN BLOOD BY AUTOMATED COUNT: 15 % (ref 12.3–15.4)
GLUCOSE BLDC GLUCOMTR-MCNC: 103 MG/DL (ref 70–130)
GLUCOSE BLDC GLUCOMTR-MCNC: 137 MG/DL (ref 70–130)
GLUCOSE BLDC GLUCOMTR-MCNC: 201 MG/DL (ref 70–130)
GLUCOSE BLDC GLUCOMTR-MCNC: 61 MG/DL (ref 70–130)
GLUCOSE BLDC GLUCOMTR-MCNC: 83 MG/DL (ref 70–130)
GLUCOSE SERPL-MCNC: 117 MG/DL (ref 65–99)
HCT VFR BLD AUTO: 26 % (ref 34–46.6)
HGB BLD-MCNC: 8.2 G/DL (ref 12–15.9)
IMM GRANULOCYTES # BLD AUTO: 0.08 10*3/MM3 (ref 0–0.05)
IMM GRANULOCYTES NFR BLD AUTO: 0.6 % (ref 0–0.5)
LYMPHOCYTES # BLD AUTO: 0.72 10*3/MM3 (ref 0.7–3.1)
LYMPHOCYTES NFR BLD AUTO: 5.7 % (ref 19.6–45.3)
MCH RBC QN AUTO: 28.1 PG (ref 26.6–33)
MCHC RBC AUTO-ENTMCNC: 31.5 G/DL (ref 31.5–35.7)
MCV RBC AUTO: 89 FL (ref 79–97)
MONOCYTES # BLD AUTO: 0.74 10*3/MM3 (ref 0.1–0.9)
MONOCYTES NFR BLD AUTO: 5.8 % (ref 5–12)
NEUTROPHILS NFR BLD AUTO: 10.94 10*3/MM3 (ref 1.7–7)
NEUTROPHILS NFR BLD AUTO: 86.3 % (ref 42.7–76)
NRBC BLD AUTO-RTO: 0 /100 WBC (ref 0–0.2)
PHOSPHATE SERPL-MCNC: 4.3 MG/DL (ref 2.5–4.5)
PLATELET # BLD AUTO: 156 10*3/MM3 (ref 140–450)
PMV BLD AUTO: 10.8 FL (ref 6–12)
POTASSIUM SERPL-SCNC: 5 MMOL/L (ref 3.5–5.2)
RBC # BLD AUTO: 2.92 10*6/MM3 (ref 3.77–5.28)
SODIUM SERPL-SCNC: 135 MMOL/L (ref 136–145)
WBC NRBC COR # BLD: 12.68 10*3/MM3 (ref 3.4–10.8)

## 2022-11-11 PROCEDURE — 80069 RENAL FUNCTION PANEL: CPT | Performed by: ORTHOPAEDIC SURGERY

## 2022-11-11 PROCEDURE — 25010000002 CEFAZOLIN IN DEXTROSE 2-4 GM/100ML-% SOLUTION: Performed by: ORTHOPAEDIC SURGERY

## 2022-11-11 PROCEDURE — 85025 COMPLETE CBC W/AUTO DIFF WBC: CPT | Performed by: HOSPITALIST

## 2022-11-11 PROCEDURE — 99231 SBSQ HOSP IP/OBS SF/LOW 25: CPT | Performed by: NURSE PRACTITIONER

## 2022-11-11 PROCEDURE — 97530 THERAPEUTIC ACTIVITIES: CPT

## 2022-11-11 PROCEDURE — 82962 GLUCOSE BLOOD TEST: CPT

## 2022-11-11 PROCEDURE — 97162 PT EVAL MOD COMPLEX 30 MIN: CPT

## 2022-11-11 PROCEDURE — 25010000002 MORPHINE PER 10 MG: Performed by: ORTHOPAEDIC SURGERY

## 2022-11-11 PROCEDURE — 25010000002 MANNITOL PER 50 ML: Performed by: HOSPITALIST

## 2022-11-11 RX ADMIN — APIXABAN 2.5 MG: 2.5 TABLET, FILM COATED ORAL at 07:44

## 2022-11-11 RX ADMIN — FOLIC ACID 1 MG: 1 TABLET ORAL at 07:44

## 2022-11-11 RX ADMIN — MANNITOL 25 G: 12.5 INJECTION, SOLUTION INTRAVENOUS at 09:30

## 2022-11-11 RX ADMIN — MORPHINE SULFATE 2 MG: 2 INJECTION, SOLUTION INTRAMUSCULAR; INTRAVENOUS at 20:06

## 2022-11-11 RX ADMIN — INSULIN GLARGINE-YFGN 4 UNITS: 100 INJECTION, SOLUTION SUBCUTANEOUS at 21:25

## 2022-11-11 RX ADMIN — CEFAZOLIN SODIUM 2 G: 2 INJECTION, SOLUTION INTRAVENOUS at 05:55

## 2022-11-11 RX ADMIN — HYDROCODONE BITARTRATE AND ACETAMINOPHEN 1 TABLET: 7.5; 325 TABLET ORAL at 18:15

## 2022-11-11 RX ADMIN — DILTIAZEM HYDROCHLORIDE 180 MG: 180 CAPSULE, EXTENDED RELEASE ORAL at 14:54

## 2022-11-11 RX ADMIN — MORPHINE SULFATE 2 MG: 2 INJECTION, SOLUTION INTRAMUSCULAR; INTRAVENOUS at 07:37

## 2022-11-11 RX ADMIN — CARVEDILOL 25 MG: 25 TABLET, FILM COATED ORAL at 19:07

## 2022-11-11 RX ADMIN — MORPHINE SULFATE 2 MG: 2 INJECTION, SOLUTION INTRAMUSCULAR; INTRAVENOUS at 03:19

## 2022-11-11 RX ADMIN — ROPINIROLE 1 MG: 1 TABLET, FILM COATED ORAL at 21:25

## 2022-11-11 RX ADMIN — HYDROCODONE BITARTRATE AND ACETAMINOPHEN 1 TABLET: 7.5; 325 TABLET ORAL at 05:55

## 2022-11-11 RX ADMIN — LEVOTHYROXINE SODIUM 250 MCG: 0.12 TABLET ORAL at 05:55

## 2022-11-11 RX ADMIN — MORPHINE SULFATE 2 MG: 2 INJECTION, SOLUTION INTRAMUSCULAR; INTRAVENOUS at 16:04

## 2022-11-11 RX ADMIN — CARVEDILOL 25 MG: 25 TABLET, FILM COATED ORAL at 07:44

## 2022-11-11 RX ADMIN — HYDROCODONE BITARTRATE AND ACETAMINOPHEN 1 TABLET: 7.5; 325 TABLET ORAL at 00:13

## 2022-11-11 RX ADMIN — MORPHINE SULFATE 2 MG: 2 INJECTION, SOLUTION INTRAMUSCULAR; INTRAVENOUS at 23:34

## 2022-11-11 RX ADMIN — SERTRALINE 150 MG: 100 TABLET, FILM COATED ORAL at 07:44

## 2022-11-11 RX ADMIN — PANTOPRAZOLE SODIUM 40 MG: 40 TABLET, DELAYED RELEASE ORAL at 07:44

## 2022-11-11 RX ADMIN — APIXABAN 2.5 MG: 2.5 TABLET, FILM COATED ORAL at 21:25

## 2022-11-11 NOTE — ANESTHESIA POSTPROCEDURE EVALUATION
"Patient: Zaina Martinez    Procedure Summary     Date: 11/10/22 Room / Location: [facility] OR [address] [place] [address] MAIN OR    Anesthesia Start: 1250 Anesthesia Stop: 1409    Procedures:       HIP INTERTROCHANTERIC NAILING (Right: Thigh)      FEMUR INTRAMEDULLARY NAILING/RODDING (Right: Thigh) Diagnosis:       Closed displaced intertrochanteric fracture of right femur, initial encounter ([facility])      (Closed displaced intertrochanteric fracture of right femur, initial encounter ([facility]) [S72.141A])    Surgeons: Glen Pierce MD Provider: Ignacio Guzmán DO    Anesthesia Type: general ASA Status: 4          Anesthesia Type: general    Vitals  Vitals Value Taken Time   /78 11/10/22 1516   Temp 36.7 °C (98 °F) 11/10/22 1515   Pulse 48 11/10/22 1529   Resp 14 11/10/22 1515   SpO2 99 % 11/10/22 1529   Vitals shown include unvalidated device data.        Post Anesthesia Care and Evaluation    Patient location during evaluation: bedside  Patient participation: complete - patient participated  Level of consciousness: awake and alert  Pain management: adequate    Airway patency: patent  Anesthetic complications: No anesthetic complications  PONV Status: none  Cardiovascular status: acceptable  Respiratory status: acceptable  Hydration status: acceptable    Comments: /81 (BP Location: Left arm, Patient Position: Lying)   Pulse 66   Temp 36.2 °C (97.2 °F) (Oral)   Resp 14   Ht 157.5 cm (62\")   Wt 57.7 kg (127 lb 3.3 oz)   SpO2 97%   BMI 23.27 kg/m²         "

## 2022-11-12 LAB
BASOPHILS # BLD AUTO: 0.02 10*3/MM3 (ref 0–0.2)
BASOPHILS NFR BLD AUTO: 0.2 % (ref 0–1.5)
DEPRECATED RDW RBC AUTO: 47.9 FL (ref 37–54)
EOSINOPHIL # BLD AUTO: 0.24 10*3/MM3 (ref 0–0.4)
EOSINOPHIL NFR BLD AUTO: 2.2 % (ref 0.3–6.2)
ERYTHROCYTE [DISTWIDTH] IN BLOOD BY AUTOMATED COUNT: 15 % (ref 12.3–15.4)
GLUCOSE BLDC GLUCOMTR-MCNC: 127 MG/DL (ref 70–130)
GLUCOSE BLDC GLUCOMTR-MCNC: 167 MG/DL (ref 70–130)
GLUCOSE BLDC GLUCOMTR-MCNC: 224 MG/DL (ref 70–130)
GLUCOSE BLDC GLUCOMTR-MCNC: 42 MG/DL (ref 70–130)
GLUCOSE BLDC GLUCOMTR-MCNC: 48 MG/DL (ref 70–130)
GLUCOSE BLDC GLUCOMTR-MCNC: 63 MG/DL (ref 70–130)
GLUCOSE BLDC GLUCOMTR-MCNC: 99 MG/DL (ref 70–130)
HCT VFR BLD AUTO: 23.7 % (ref 34–46.6)
HGB BLD-MCNC: 7.5 G/DL (ref 12–15.9)
IMM GRANULOCYTES # BLD AUTO: 0.08 10*3/MM3 (ref 0–0.05)
IMM GRANULOCYTES NFR BLD AUTO: 0.7 % (ref 0–0.5)
LYMPHOCYTES # BLD AUTO: 1.2 10*3/MM3 (ref 0.7–3.1)
LYMPHOCYTES NFR BLD AUTO: 11.1 % (ref 19.6–45.3)
MCH RBC QN AUTO: 27.8 PG (ref 26.6–33)
MCHC RBC AUTO-ENTMCNC: 31.6 G/DL (ref 31.5–35.7)
MCV RBC AUTO: 87.8 FL (ref 79–97)
MONOCYTES # BLD AUTO: 1.09 10*3/MM3 (ref 0.1–0.9)
MONOCYTES NFR BLD AUTO: 10.1 % (ref 5–12)
NEUTROPHILS NFR BLD AUTO: 75.7 % (ref 42.7–76)
NEUTROPHILS NFR BLD AUTO: 8.19 10*3/MM3 (ref 1.7–7)
NRBC BLD AUTO-RTO: 0.2 /100 WBC (ref 0–0.2)
PLATELET # BLD AUTO: 145 10*3/MM3 (ref 140–450)
PMV BLD AUTO: 11.1 FL (ref 6–12)
RBC # BLD AUTO: 2.7 10*6/MM3 (ref 3.77–5.28)
WBC NRBC COR # BLD: 10.82 10*3/MM3 (ref 3.4–10.8)

## 2022-11-12 PROCEDURE — 82962 GLUCOSE BLOOD TEST: CPT

## 2022-11-12 PROCEDURE — 85025 COMPLETE CBC W/AUTO DIFF WBC: CPT | Performed by: HOSPITALIST

## 2022-11-12 PROCEDURE — 63710000001 DIPHENHYDRAMINE PER 50 MG: Performed by: HOSPITALIST

## 2022-11-12 PROCEDURE — 63710000001 INSULIN LISPRO (HUMAN) PER 5 UNITS: Performed by: ORTHOPAEDIC SURGERY

## 2022-11-12 PROCEDURE — 25010000002 MORPHINE PER 10 MG: Performed by: ORTHOPAEDIC SURGERY

## 2022-11-12 RX ORDER — DEXTROSE MONOHYDRATE 25 G/50ML
25 INJECTION, SOLUTION INTRAVENOUS
Status: DISCONTINUED | OUTPATIENT
Start: 2022-11-12 | End: 2022-11-24 | Stop reason: HOSPADM

## 2022-11-12 RX ORDER — NICOTINE POLACRILEX 4 MG
15 LOZENGE BUCCAL
Status: DISCONTINUED | OUTPATIENT
Start: 2022-11-12 | End: 2022-11-12 | Stop reason: SDUPTHER

## 2022-11-12 RX ORDER — DIPHENHYDRAMINE HCL 25 MG
25 CAPSULE ORAL EVERY 6 HOURS PRN
Status: DISCONTINUED | OUTPATIENT
Start: 2022-11-12 | End: 2022-11-24 | Stop reason: HOSPADM

## 2022-11-12 RX ORDER — INSULIN LISPRO 100 [IU]/ML
0-7 INJECTION, SOLUTION INTRAVENOUS; SUBCUTANEOUS
Status: DISCONTINUED | OUTPATIENT
Start: 2022-11-13 | End: 2022-11-24 | Stop reason: HOSPADM

## 2022-11-12 RX ORDER — OXYCODONE HYDROCHLORIDE 5 MG/1
5 TABLET ORAL ONCE
Status: COMPLETED | OUTPATIENT
Start: 2022-11-12 | End: 2022-11-12

## 2022-11-12 RX ORDER — HYDROCODONE BITARTRATE AND ACETAMINOPHEN 7.5; 325 MG/1; MG/1
1 TABLET ORAL EVERY 4 HOURS PRN
Status: DISCONTINUED | OUTPATIENT
Start: 2022-11-12 | End: 2022-11-13

## 2022-11-12 RX ADMIN — PANTOPRAZOLE SODIUM 40 MG: 40 TABLET, DELAYED RELEASE ORAL at 09:46

## 2022-11-12 RX ADMIN — DIPHENHYDRAMINE HYDROCHLORIDE 25 MG: 25 CAPSULE ORAL at 14:09

## 2022-11-12 RX ADMIN — INSULIN LISPRO 4 UNITS: 100 INJECTION, SOLUTION INTRAVENOUS; SUBCUTANEOUS at 09:46

## 2022-11-12 RX ADMIN — CARVEDILOL 25 MG: 25 TABLET, FILM COATED ORAL at 12:38

## 2022-11-12 RX ADMIN — SERTRALINE 150 MG: 100 TABLET, FILM COATED ORAL at 09:46

## 2022-11-12 RX ADMIN — APIXABAN 2.5 MG: 2.5 TABLET, FILM COATED ORAL at 09:46

## 2022-11-12 RX ADMIN — HYDROCODONE BITARTRATE AND ACETAMINOPHEN 1 TABLET: 7.5; 325 TABLET ORAL at 06:30

## 2022-11-12 RX ADMIN — OXYCODONE HYDROCHLORIDE 5 MG: 5 TABLET ORAL at 16:10

## 2022-11-12 RX ADMIN — FOLIC ACID 1 MG: 1 TABLET ORAL at 09:46

## 2022-11-12 RX ADMIN — HYDROCODONE BITARTRATE AND ACETAMINOPHEN 1 TABLET: 7.5; 325 TABLET ORAL at 00:29

## 2022-11-12 RX ADMIN — INSULIN LISPRO 2 UNITS: 100 INJECTION, SOLUTION INTRAVENOUS; SUBCUTANEOUS at 14:00

## 2022-11-12 RX ADMIN — DILTIAZEM HYDROCHLORIDE 180 MG: 180 CAPSULE, EXTENDED RELEASE ORAL at 12:38

## 2022-11-12 RX ADMIN — LEVOTHYROXINE SODIUM 250 MCG: 0.12 TABLET ORAL at 06:30

## 2022-11-12 RX ADMIN — MORPHINE SULFATE 2 MG: 2 INJECTION, SOLUTION INTRAMUSCULAR; INTRAVENOUS at 18:54

## 2022-11-12 RX ADMIN — APIXABAN 2.5 MG: 2.5 TABLET, FILM COATED ORAL at 22:42

## 2022-11-12 RX ADMIN — HYDROCODONE BITARTRATE AND ACETAMINOPHEN 1 TABLET: 7.5; 325 TABLET ORAL at 12:38

## 2022-11-12 RX ADMIN — CARVEDILOL 25 MG: 25 TABLET, FILM COATED ORAL at 18:53

## 2022-11-12 RX ADMIN — ROPINIROLE 1 MG: 1 TABLET, FILM COATED ORAL at 22:42

## 2022-11-13 ENCOUNTER — APPOINTMENT (OUTPATIENT)
Dept: CARDIOLOGY | Facility: HOSPITAL | Age: 57
End: 2022-11-13

## 2022-11-13 LAB
ALBUMIN SERPL-MCNC: 3.4 G/DL (ref 3.5–5.2)
ANION GAP SERPL CALCULATED.3IONS-SCNC: 11 MMOL/L (ref 5–15)
AORTIC DIMENSIONLESS INDEX: 0.8 (DI)
BASOPHILS # BLD AUTO: 0.03 10*3/MM3 (ref 0–0.2)
BASOPHILS NFR BLD AUTO: 0.3 % (ref 0–1.5)
BH CV ECHO MEAS - AO MAX PG: 14.7 MMHG
BH CV ECHO MEAS - AO MEAN PG: 7.7 MMHG
BH CV ECHO MEAS - AO V2 MAX: 192 CM/SEC
BH CV ECHO MEAS - AO V2 VTI: 42 CM
BH CV ECHO MEAS - AVA(I,D): 1.93 CM2
BH CV ECHO MEAS - EDV(CUBED): 90.9 ML
BH CV ECHO MEAS - EDV(MOD-SP2): 130 ML
BH CV ECHO MEAS - EDV(MOD-SP4): 91 ML
BH CV ECHO MEAS - EF(MOD-BP): 52.8 %
BH CV ECHO MEAS - EF(MOD-SP2): 54.6 %
BH CV ECHO MEAS - EF(MOD-SP4): 47.3 %
BH CV ECHO MEAS - ESV(CUBED): 40.8 ML
BH CV ECHO MEAS - ESV(MOD-SP2): 59 ML
BH CV ECHO MEAS - ESV(MOD-SP4): 48 ML
BH CV ECHO MEAS - FS: 23.4 %
BH CV ECHO MEAS - IVS/LVPW: 0.93 CM
BH CV ECHO MEAS - IVSD: 0.85 CM
BH CV ECHO MEAS - LAT PEAK E' VEL: 9.1 CM/SEC
BH CV ECHO MEAS - LV DIASTOLIC VOL/BSA (35-75): 56.6 CM2
BH CV ECHO MEAS - LV MASS(C)D: 130.4 GRAMS
BH CV ECHO MEAS - LV MAX PG: 12.1 MMHG
BH CV ECHO MEAS - LV MEAN PG: 4.9 MMHG
BH CV ECHO MEAS - LV SYSTOLIC VOL/BSA (12-30): 29.9 CM2
BH CV ECHO MEAS - LV V1 MAX: 173.8 CM/SEC
BH CV ECHO MEAS - LV V1 VTI: 35.2 CM
BH CV ECHO MEAS - LVIDD: 4.5 CM
BH CV ECHO MEAS - LVIDS: 3.4 CM
BH CV ECHO MEAS - LVOT AREA: 2.31 CM2
BH CV ECHO MEAS - LVOT DIAM: 1.72 CM
BH CV ECHO MEAS - LVPWD: 0.92 CM
BH CV ECHO MEAS - MED PEAK E' VEL: 4 CM/SEC
BH CV ECHO MEAS - MR MAX PG: 89.3 MMHG
BH CV ECHO MEAS - MR MAX VEL: 472.5 CM/SEC
BH CV ECHO MEAS - MV A DUR: 0.22 SEC
BH CV ECHO MEAS - MV A MAX VEL: 97.9 CM/SEC
BH CV ECHO MEAS - MV DEC SLOPE: 799.4 CM/SEC2
BH CV ECHO MEAS - MV DEC TIME: 190 MSEC
BH CV ECHO MEAS - MV E MAX VEL: 137 CM/SEC
BH CV ECHO MEAS - MV E/A: 1.4
BH CV ECHO MEAS - MV MAX PG: 8.3 MMHG
BH CV ECHO MEAS - MV MEAN PG: 2.17 MMHG
BH CV ECHO MEAS - MV P1/2T: 53.7 MSEC
BH CV ECHO MEAS - MV V2 VTI: 41 CM
BH CV ECHO MEAS - MVA(P1/2T): 4.1 CM2
BH CV ECHO MEAS - MVA(VTI): 1.98 CM2
BH CV ECHO MEAS - PA ACC TIME: 0.13 SEC
BH CV ECHO MEAS - PA PR(ACCEL): 18.8 MMHG
BH CV ECHO MEAS - PA V2 MAX: 80.2 CM/SEC
BH CV ECHO MEAS - PULM A REVS DUR: 0.18 SEC
BH CV ECHO MEAS - PULM A REVS VEL: 20.8 CM/SEC
BH CV ECHO MEAS - PULM DIAS VEL: 68.7 CM/SEC
BH CV ECHO MEAS - PULM S/D: 0.88
BH CV ECHO MEAS - PULM SYS VEL: 60.8 CM/SEC
BH CV ECHO MEAS - QP/QS: 0.61
BH CV ECHO MEAS - RAP SYSTOLE: 8 MMHG
BH CV ECHO MEAS - RV MAX PG: 1.09 MMHG
BH CV ECHO MEAS - RV V1 MAX: 52.3 CM/SEC
BH CV ECHO MEAS - RV V1 VTI: 14.1 CM
BH CV ECHO MEAS - RVOT DIAM: 2.12 CM
BH CV ECHO MEAS - RVSP: 34.9 MMHG
BH CV ECHO MEAS - SI(MOD-SP2): 44.2 ML/M2
BH CV ECHO MEAS - SI(MOD-SP4): 26.8 ML/M2
BH CV ECHO MEAS - SV(LVOT): 81.3 ML
BH CV ECHO MEAS - SV(MOD-SP2): 71 ML
BH CV ECHO MEAS - SV(MOD-SP4): 43 ML
BH CV ECHO MEAS - SV(RVOT): 50 ML
BH CV ECHO MEAS - TAPSE (>1.6): 1.7 CM
BH CV ECHO MEAS - TR MAX PG: 26.9 MMHG
BH CV ECHO MEAS - TR MAX VEL: 259.3 CM/SEC
BH CV ECHO MEASUREMENTS AVERAGE E/E' RATIO: 20.92
BH CV VAS BP RIGHT ARM: NORMAL MMHG
BH CV XLRA - RV BASE: 3.9 CM
BH CV XLRA - RV LENGTH: 7.4 CM
BH CV XLRA - RV MID: 3.1 CM
BH CV XLRA - TDI S': 9.7 CM/SEC
BUN SERPL-MCNC: 15 MG/DL (ref 6–20)
BUN/CREAT SERPL: 4.8 (ref 7–25)
CALCIUM SPEC-SCNC: 8.3 MG/DL (ref 8.6–10.5)
CHLORIDE SERPL-SCNC: 98 MMOL/L (ref 98–107)
CO2 SERPL-SCNC: 22 MMOL/L (ref 22–29)
CREAT SERPL-MCNC: 3.1 MG/DL (ref 0.57–1)
DEPRECATED RDW RBC AUTO: 48.1 FL (ref 37–54)
EGFRCR SERPLBLD CKD-EPI 2021: 16.9 ML/MIN/1.73
EOSINOPHIL # BLD AUTO: 0.13 10*3/MM3 (ref 0–0.4)
EOSINOPHIL NFR BLD AUTO: 1.1 % (ref 0.3–6.2)
ERYTHROCYTE [DISTWIDTH] IN BLOOD BY AUTOMATED COUNT: 15.2 % (ref 12.3–15.4)
GLUCOSE BLDC GLUCOMTR-MCNC: 166 MG/DL (ref 70–130)
GLUCOSE BLDC GLUCOMTR-MCNC: 194 MG/DL (ref 70–130)
GLUCOSE BLDC GLUCOMTR-MCNC: 203 MG/DL (ref 70–130)
GLUCOSE BLDC GLUCOMTR-MCNC: 219 MG/DL (ref 70–130)
GLUCOSE BLDC GLUCOMTR-MCNC: 219 MG/DL (ref 70–130)
GLUCOSE BLDC GLUCOMTR-MCNC: 221 MG/DL (ref 70–130)
GLUCOSE BLDC GLUCOMTR-MCNC: 251 MG/DL (ref 70–130)
GLUCOSE SERPL-MCNC: 161 MG/DL (ref 65–99)
HCT VFR BLD AUTO: 27 % (ref 34–46.6)
HGB BLD-MCNC: 8.6 G/DL (ref 12–15.9)
IMM GRANULOCYTES # BLD AUTO: 0.07 10*3/MM3 (ref 0–0.05)
IMM GRANULOCYTES NFR BLD AUTO: 0.6 % (ref 0–0.5)
LEFT ATRIUM VOLUME INDEX: 55.2 ML/M2
LEFT ATRIUM VOLUME: 89 ML
LYMPHOCYTES # BLD AUTO: 0.84 10*3/MM3 (ref 0.7–3.1)
LYMPHOCYTES NFR BLD AUTO: 7.1 % (ref 19.6–45.3)
MAXIMAL PREDICTED HEART RATE: 163 BPM
MCH RBC QN AUTO: 27.7 PG (ref 26.6–33)
MCHC RBC AUTO-ENTMCNC: 31.9 G/DL (ref 31.5–35.7)
MCV RBC AUTO: 87.1 FL (ref 79–97)
MONOCYTES # BLD AUTO: 0.71 10*3/MM3 (ref 0.1–0.9)
MONOCYTES NFR BLD AUTO: 6 % (ref 5–12)
NEUTROPHILS NFR BLD AUTO: 10.12 10*3/MM3 (ref 1.7–7)
NEUTROPHILS NFR BLD AUTO: 84.9 % (ref 42.7–76)
NRBC BLD AUTO-RTO: 0 /100 WBC (ref 0–0.2)
PHOSPHATE SERPL-MCNC: 3.5 MG/DL (ref 2.5–4.5)
PLATELET # BLD AUTO: 168 10*3/MM3 (ref 140–450)
PMV BLD AUTO: 10.7 FL (ref 6–12)
POTASSIUM SERPL-SCNC: 4.8 MMOL/L (ref 3.5–5.2)
POTASSIUM SERPL-SCNC: 5.4 MMOL/L (ref 3.5–5.2)
QT INTERVAL: 472 MS
RBC # BLD AUTO: 3.1 10*6/MM3 (ref 3.77–5.28)
SINUS: 3.2 CM
SODIUM SERPL-SCNC: 131 MMOL/L (ref 136–145)
STRESS TARGET HR: 139 BPM
T3FREE SERPL-MCNC: 1.22 PG/ML (ref 2–4.4)
WBC NRBC COR # BLD: 11.9 10*3/MM3 (ref 3.4–10.8)

## 2022-11-13 PROCEDURE — 93306 TTE W/DOPPLER COMPLETE: CPT

## 2022-11-13 PROCEDURE — 99233 SBSQ HOSP IP/OBS HIGH 50: CPT | Performed by: INTERNAL MEDICINE

## 2022-11-13 PROCEDURE — 93306 TTE W/DOPPLER COMPLETE: CPT | Performed by: INTERNAL MEDICINE

## 2022-11-13 PROCEDURE — 25010000002 NALOXONE PER 1 MG: Performed by: STUDENT IN AN ORGANIZED HEALTH CARE EDUCATION/TRAINING PROGRAM

## 2022-11-13 PROCEDURE — 94799 UNLISTED PULMONARY SVC/PX: CPT

## 2022-11-13 PROCEDURE — 25010000002 NALOXONE HCL 2 MG/2ML SOLUTION PREFILLED SYRINGE 2 ML SYRINGE: Performed by: STUDENT IN AN ORGANIZED HEALTH CARE EDUCATION/TRAINING PROGRAM

## 2022-11-13 PROCEDURE — 93010 ELECTROCARDIOGRAM REPORT: CPT | Performed by: INTERNAL MEDICINE

## 2022-11-13 PROCEDURE — 25010000002 NALOXONE PER 1 MG

## 2022-11-13 PROCEDURE — 84132 ASSAY OF SERUM POTASSIUM: CPT | Performed by: INTERNAL MEDICINE

## 2022-11-13 PROCEDURE — 84481 FREE ASSAY (FT-3): CPT | Performed by: STUDENT IN AN ORGANIZED HEALTH CARE EDUCATION/TRAINING PROGRAM

## 2022-11-13 PROCEDURE — 82962 GLUCOSE BLOOD TEST: CPT

## 2022-11-13 PROCEDURE — 93005 ELECTROCARDIOGRAM TRACING: CPT | Performed by: STUDENT IN AN ORGANIZED HEALTH CARE EDUCATION/TRAINING PROGRAM

## 2022-11-13 PROCEDURE — 25010000002 MORPHINE PER 10 MG: Performed by: STUDENT IN AN ORGANIZED HEALTH CARE EDUCATION/TRAINING PROGRAM

## 2022-11-13 PROCEDURE — 80069 RENAL FUNCTION PANEL: CPT | Performed by: HOSPITALIST

## 2022-11-13 PROCEDURE — 85025 COMPLETE CBC W/AUTO DIFF WBC: CPT | Performed by: HOSPITALIST

## 2022-11-13 RX ORDER — ROPINIROLE 2 MG/1
2 TABLET, FILM COATED ORAL NIGHTLY
Status: DISCONTINUED | OUTPATIENT
Start: 2022-11-13 | End: 2022-11-24 | Stop reason: HOSPADM

## 2022-11-13 RX ORDER — NALOXONE HCL 0.4 MG/ML
0.2 VIAL (ML) INJECTION
Status: DISCONTINUED | OUTPATIENT
Start: 2022-11-13 | End: 2022-11-24 | Stop reason: HOSPADM

## 2022-11-13 RX ORDER — ACETAMINOPHEN 500 MG
1000 TABLET ORAL EVERY 8 HOURS PRN
Status: DISCONTINUED | OUTPATIENT
Start: 2022-11-13 | End: 2022-11-24 | Stop reason: HOSPADM

## 2022-11-13 RX ORDER — ATROPINE SULFATE 0.4 MG/ML
0.4 AMPUL (ML) INJECTION ONCE
Status: DISCONTINUED | OUTPATIENT
Start: 2022-11-13 | End: 2022-11-16

## 2022-11-13 RX ORDER — LEVOTHYROXINE SODIUM 0.03 MG/1
25 TABLET ORAL ONCE
Status: COMPLETED | OUTPATIENT
Start: 2022-11-13 | End: 2022-11-13

## 2022-11-13 RX ORDER — NALOXONE HYDROCHLORIDE 0.4 MG/ML
INJECTION, SOLUTION INTRAMUSCULAR; INTRAVENOUS; SUBCUTANEOUS
Status: COMPLETED
Start: 2022-11-13 | End: 2022-11-13

## 2022-11-13 RX ORDER — MORPHINE SULFATE 2 MG/ML
1 INJECTION, SOLUTION INTRAMUSCULAR; INTRAVENOUS EVERY 4 HOURS PRN
Status: DISCONTINUED | OUTPATIENT
Start: 2022-11-13 | End: 2022-11-13

## 2022-11-13 RX ADMIN — HYDROCODONE BITARTRATE AND ACETAMINOPHEN 1 TABLET: 7.5; 325 TABLET ORAL at 01:41

## 2022-11-13 RX ADMIN — FOLIC ACID 1 MG: 1 TABLET ORAL at 08:38

## 2022-11-13 RX ADMIN — CARVEDILOL 25 MG: 25 TABLET, FILM COATED ORAL at 08:38

## 2022-11-13 RX ADMIN — NALOXONE HYDROCHLORIDE 0.4 MG/HR: 1 INJECTION PARENTERAL at 15:40

## 2022-11-13 RX ADMIN — APIXABAN 2.5 MG: 2.5 TABLET, FILM COATED ORAL at 08:38

## 2022-11-13 RX ADMIN — Medication 10 ML: at 08:39

## 2022-11-13 RX ADMIN — INSULIN GLARGINE-YFGN 2 UNITS: 100 INJECTION, SOLUTION SUBCUTANEOUS at 21:39

## 2022-11-13 RX ADMIN — SERTRALINE 150 MG: 100 TABLET, FILM COATED ORAL at 08:38

## 2022-11-13 RX ADMIN — NALOXONE HYDROCHLORIDE 0.2 MG: 0.4 INJECTION, SOLUTION INTRAMUSCULAR; INTRAVENOUS; SUBCUTANEOUS at 13:34

## 2022-11-13 RX ADMIN — LEVOTHYROXINE SODIUM 250 MCG: 0.12 TABLET ORAL at 05:52

## 2022-11-13 RX ADMIN — ROPINIROLE 2 MG: 2 TABLET, FILM COATED ORAL at 21:41

## 2022-11-13 RX ADMIN — Medication 3 MG: at 21:40

## 2022-11-13 RX ADMIN — MORPHINE SULFATE 1 MG: 2 INJECTION, SOLUTION INTRAMUSCULAR; INTRAVENOUS at 08:39

## 2022-11-13 RX ADMIN — NALOXONE HYDROCHLORIDE 0.2 MG: 0.4 INJECTION, SOLUTION INTRAMUSCULAR; INTRAVENOUS; SUBCUTANEOUS at 11:58

## 2022-11-13 RX ADMIN — DILTIAZEM HYDROCHLORIDE 180 MG: 180 CAPSULE, EXTENDED RELEASE ORAL at 08:38

## 2022-11-13 RX ADMIN — LEVOTHYROXINE SODIUM 25 MCG: 0.03 TABLET ORAL at 18:54

## 2022-11-13 RX ADMIN — HYDROCODONE BITARTRATE AND ACETAMINOPHEN 1 TABLET: 7.5; 325 TABLET ORAL at 05:52

## 2022-11-13 RX ADMIN — APIXABAN 2.5 MG: 2.5 TABLET, FILM COATED ORAL at 21:41

## 2022-11-13 RX ADMIN — PANTOPRAZOLE SODIUM 40 MG: 40 TABLET, DELAYED RELEASE ORAL at 08:39

## 2022-11-14 LAB
ANION GAP SERPL CALCULATED.3IONS-SCNC: 15.7 MMOL/L (ref 5–15)
BASOPHILS # BLD AUTO: 0.03 10*3/MM3 (ref 0–0.2)
BASOPHILS NFR BLD AUTO: 0.2 % (ref 0–1.5)
BUN SERPL-MCNC: 25 MG/DL (ref 6–20)
BUN/CREAT SERPL: 6.7 (ref 7–25)
CALCIUM SPEC-SCNC: 7.9 MG/DL (ref 8.6–10.5)
CHLORIDE SERPL-SCNC: 98 MMOL/L (ref 98–107)
CO2 SERPL-SCNC: 15.3 MMOL/L (ref 22–29)
CREAT SERPL-MCNC: 3.74 MG/DL (ref 0.57–1)
DEPRECATED RDW RBC AUTO: 48.3 FL (ref 37–54)
EGFRCR SERPLBLD CKD-EPI 2021: 13.5 ML/MIN/1.73
EOSINOPHIL # BLD AUTO: 0.01 10*3/MM3 (ref 0–0.4)
EOSINOPHIL NFR BLD AUTO: 0.1 % (ref 0.3–6.2)
ERYTHROCYTE [DISTWIDTH] IN BLOOD BY AUTOMATED COUNT: 15.6 % (ref 12.3–15.4)
GLUCOSE BLDC GLUCOMTR-MCNC: 168 MG/DL (ref 70–130)
GLUCOSE BLDC GLUCOMTR-MCNC: 183 MG/DL (ref 70–130)
GLUCOSE BLDC GLUCOMTR-MCNC: 221 MG/DL (ref 70–130)
GLUCOSE BLDC GLUCOMTR-MCNC: 95 MG/DL (ref 70–130)
GLUCOSE SERPL-MCNC: 233 MG/DL (ref 65–99)
HCT VFR BLD AUTO: 25.5 % (ref 34–46.6)
HGB BLD-MCNC: 8.1 G/DL (ref 12–15.9)
IMM GRANULOCYTES # BLD AUTO: 0.13 10*3/MM3 (ref 0–0.05)
IMM GRANULOCYTES NFR BLD AUTO: 0.8 % (ref 0–0.5)
LYMPHOCYTES # BLD AUTO: 1.21 10*3/MM3 (ref 0.7–3.1)
LYMPHOCYTES NFR BLD AUTO: 7.9 % (ref 19.6–45.3)
MAGNESIUM SERPL-MCNC: 2 MG/DL (ref 1.6–2.6)
MCH RBC QN AUTO: 27.6 PG (ref 26.6–33)
MCHC RBC AUTO-ENTMCNC: 31.8 G/DL (ref 31.5–35.7)
MCV RBC AUTO: 86.7 FL (ref 79–97)
MONOCYTES # BLD AUTO: 1.16 10*3/MM3 (ref 0.1–0.9)
MONOCYTES NFR BLD AUTO: 7.6 % (ref 5–12)
NEUTROPHILS NFR BLD AUTO: 12.79 10*3/MM3 (ref 1.7–7)
NEUTROPHILS NFR BLD AUTO: 83.4 % (ref 42.7–76)
NRBC BLD AUTO-RTO: 0 /100 WBC (ref 0–0.2)
PLATELET # BLD AUTO: 184 10*3/MM3 (ref 140–450)
PMV BLD AUTO: 11.5 FL (ref 6–12)
POTASSIUM SERPL-SCNC: 6 MMOL/L (ref 3.5–5.2)
PROCALCITONIN SERPL-MCNC: 0.23 NG/ML (ref 0–0.25)
RBC # BLD AUTO: 2.94 10*6/MM3 (ref 3.77–5.28)
SODIUM SERPL-SCNC: 129 MMOL/L (ref 136–145)
WBC NRBC COR # BLD: 15.33 10*3/MM3 (ref 3.4–10.8)

## 2022-11-14 PROCEDURE — 25010000002 CALCIUM GLUCONATE-NACL 1-0.675 GM/50ML-% SOLUTION: Performed by: HOSPITALIST

## 2022-11-14 PROCEDURE — 83735 ASSAY OF MAGNESIUM: CPT | Performed by: STUDENT IN AN ORGANIZED HEALTH CARE EDUCATION/TRAINING PROGRAM

## 2022-11-14 PROCEDURE — 82962 GLUCOSE BLOOD TEST: CPT

## 2022-11-14 PROCEDURE — 99232 SBSQ HOSP IP/OBS MODERATE 35: CPT | Performed by: INTERNAL MEDICINE

## 2022-11-14 PROCEDURE — 85025 COMPLETE CBC W/AUTO DIFF WBC: CPT | Performed by: HOSPITALIST

## 2022-11-14 PROCEDURE — 80048 BASIC METABOLIC PNL TOTAL CA: CPT | Performed by: STUDENT IN AN ORGANIZED HEALTH CARE EDUCATION/TRAINING PROGRAM

## 2022-11-14 PROCEDURE — 63710000001 INSULIN LISPRO (HUMAN) PER 5 UNITS: Performed by: HOSPITALIST

## 2022-11-14 PROCEDURE — 84145 PROCALCITONIN (PCT): CPT | Performed by: STUDENT IN AN ORGANIZED HEALTH CARE EDUCATION/TRAINING PROGRAM

## 2022-11-14 PROCEDURE — 25010000002 HEPARIN (PORCINE) PER 1000 UNITS: Performed by: HOSPITALIST

## 2022-11-14 RX ORDER — OXYCODONE HYDROCHLORIDE AND ACETAMINOPHEN 5; 325 MG/1; MG/1
1 TABLET ORAL EVERY 6 HOURS PRN
Status: DISCONTINUED | OUTPATIENT
Start: 2022-11-14 | End: 2022-11-15

## 2022-11-14 RX ORDER — HEPARIN SODIUM 1000 [USP'U]/ML
3800 INJECTION, SOLUTION INTRAVENOUS; SUBCUTANEOUS AS NEEDED
Status: DISCONTINUED | OUTPATIENT
Start: 2022-11-14 | End: 2022-11-24 | Stop reason: HOSPADM

## 2022-11-14 RX ORDER — CALCIUM GLUCONATE 20 MG/ML
1 INJECTION, SOLUTION INTRAVENOUS ONCE
Status: COMPLETED | OUTPATIENT
Start: 2022-11-14 | End: 2022-11-14

## 2022-11-14 RX ORDER — MANNITOL 250 MG/ML
25 INJECTION, SOLUTION INTRAVENOUS AS NEEDED
Status: ACTIVE | OUTPATIENT
Start: 2022-11-14 | End: 2022-11-15

## 2022-11-14 RX ADMIN — PANTOPRAZOLE SODIUM 40 MG: 40 TABLET, DELAYED RELEASE ORAL at 08:03

## 2022-11-14 RX ADMIN — HEPARIN SODIUM 3800 UNITS: 1000 INJECTION INTRAVENOUS; SUBCUTANEOUS at 17:33

## 2022-11-14 RX ADMIN — INSULIN LISPRO 2 UNITS: 100 INJECTION, SOLUTION INTRAVENOUS; SUBCUTANEOUS at 18:32

## 2022-11-14 RX ADMIN — Medication 3 MG: at 22:29

## 2022-11-14 RX ADMIN — APIXABAN 2.5 MG: 2.5 TABLET, FILM COATED ORAL at 20:38

## 2022-11-14 RX ADMIN — OXYCODONE AND ACETAMINOPHEN 1 TABLET: 5; 325 TABLET ORAL at 19:43

## 2022-11-14 RX ADMIN — INSULIN GLARGINE-YFGN 4 UNITS: 100 INJECTION, SOLUTION SUBCUTANEOUS at 22:29

## 2022-11-14 RX ADMIN — APIXABAN 2.5 MG: 2.5 TABLET, FILM COATED ORAL at 08:04

## 2022-11-14 RX ADMIN — CALCIUM GLUCONATE 1 G: 20 INJECTION, SOLUTION INTRAVENOUS at 11:26

## 2022-11-14 RX ADMIN — ROPINIROLE 2 MG: 2 TABLET, FILM COATED ORAL at 20:38

## 2022-11-14 RX ADMIN — FOLIC ACID 1 MG: 1 TABLET ORAL at 08:04

## 2022-11-14 RX ADMIN — SERTRALINE 150 MG: 100 TABLET, FILM COATED ORAL at 08:03

## 2022-11-14 RX ADMIN — LEVOTHYROXINE SODIUM 275 MCG: 0.17 TABLET ORAL at 05:33

## 2022-11-14 RX ADMIN — INSULIN LISPRO 2 UNITS: 100 INJECTION, SOLUTION INTRAVENOUS; SUBCUTANEOUS at 11:25

## 2022-11-14 RX ADMIN — INSULIN LISPRO 3 UNITS: 100 INJECTION, SOLUTION INTRAVENOUS; SUBCUTANEOUS at 07:56

## 2022-11-15 ENCOUNTER — APPOINTMENT (OUTPATIENT)
Dept: PREADMISSION TESTING | Facility: HOSPITAL | Age: 57
End: 2022-11-15

## 2022-11-15 LAB
ALBUMIN SERPL-MCNC: 2.4 G/DL (ref 3.5–5.2)
ANION GAP SERPL CALCULATED.3IONS-SCNC: 8 MMOL/L (ref 5–15)
BASOPHILS # BLD AUTO: 0.02 10*3/MM3 (ref 0–0.2)
BASOPHILS NFR BLD AUTO: 0.2 % (ref 0–1.5)
BUN SERPL-MCNC: 10 MG/DL (ref 6–20)
BUN/CREAT SERPL: 5 (ref 7–25)
CALCIUM SPEC-SCNC: 7.9 MG/DL (ref 8.6–10.5)
CHLORIDE SERPL-SCNC: 104 MMOL/L (ref 98–107)
CO2 SERPL-SCNC: 24 MMOL/L (ref 22–29)
CREAT SERPL-MCNC: 2.01 MG/DL (ref 0.57–1)
DEPRECATED RDW RBC AUTO: 45.8 FL (ref 37–54)
EGFRCR SERPLBLD CKD-EPI 2021: 28.5 ML/MIN/1.73
EOSINOPHIL # BLD AUTO: 0.07 10*3/MM3 (ref 0–0.4)
EOSINOPHIL NFR BLD AUTO: 0.8 % (ref 0.3–6.2)
ERYTHROCYTE [DISTWIDTH] IN BLOOD BY AUTOMATED COUNT: 15.4 % (ref 12.3–15.4)
GLUCOSE BLDC GLUCOMTR-MCNC: 108 MG/DL (ref 70–130)
GLUCOSE BLDC GLUCOMTR-MCNC: 158 MG/DL (ref 70–130)
GLUCOSE BLDC GLUCOMTR-MCNC: 85 MG/DL (ref 70–130)
GLUCOSE BLDC GLUCOMTR-MCNC: 93 MG/DL (ref 70–130)
GLUCOSE SERPL-MCNC: 88 MG/DL (ref 65–99)
HCT VFR BLD AUTO: 23.5 % (ref 34–46.6)
HGB BLD-MCNC: 7.9 G/DL (ref 12–15.9)
IMM GRANULOCYTES # BLD AUTO: 0.05 10*3/MM3 (ref 0–0.05)
IMM GRANULOCYTES NFR BLD AUTO: 0.5 % (ref 0–0.5)
LYMPHOCYTES # BLD AUTO: 1.11 10*3/MM3 (ref 0.7–3.1)
LYMPHOCYTES NFR BLD AUTO: 12.1 % (ref 19.6–45.3)
MCH RBC QN AUTO: 27.5 PG (ref 26.6–33)
MCHC RBC AUTO-ENTMCNC: 33.6 G/DL (ref 31.5–35.7)
MCV RBC AUTO: 81.9 FL (ref 79–97)
MONOCYTES # BLD AUTO: 0.81 10*3/MM3 (ref 0.1–0.9)
MONOCYTES NFR BLD AUTO: 8.9 % (ref 5–12)
NEUTROPHILS NFR BLD AUTO: 7.09 10*3/MM3 (ref 1.7–7)
NEUTROPHILS NFR BLD AUTO: 77.5 % (ref 42.7–76)
NRBC BLD AUTO-RTO: 0 /100 WBC (ref 0–0.2)
PHOSPHATE SERPL-MCNC: 2.1 MG/DL (ref 2.5–4.5)
PLATELET # BLD AUTO: 192 10*3/MM3 (ref 140–450)
PMV BLD AUTO: 9.9 FL (ref 6–12)
POTASSIUM SERPL-SCNC: 3.6 MMOL/L (ref 3.5–5.2)
RBC # BLD AUTO: 2.87 10*6/MM3 (ref 3.77–5.28)
SODIUM SERPL-SCNC: 136 MMOL/L (ref 136–145)
WBC NRBC COR # BLD: 9.15 10*3/MM3 (ref 3.4–10.8)

## 2022-11-15 PROCEDURE — 82962 GLUCOSE BLOOD TEST: CPT

## 2022-11-15 PROCEDURE — 97530 THERAPEUTIC ACTIVITIES: CPT

## 2022-11-15 PROCEDURE — 85025 COMPLETE CBC W/AUTO DIFF WBC: CPT | Performed by: HOSPITALIST

## 2022-11-15 PROCEDURE — 80069 RENAL FUNCTION PANEL: CPT | Performed by: HOSPITALIST

## 2022-11-15 PROCEDURE — 99232 SBSQ HOSP IP/OBS MODERATE 35: CPT | Performed by: NURSE PRACTITIONER

## 2022-11-15 RX ORDER — AMLODIPINE BESYLATE 2.5 MG/1
2.5 TABLET ORAL
Status: DISCONTINUED | OUTPATIENT
Start: 2022-11-15 | End: 2022-11-17

## 2022-11-15 RX ORDER — OXYCODONE AND ACETAMINOPHEN 7.5; 325 MG/1; MG/1
1 TABLET ORAL EVERY 4 HOURS PRN
Status: DISPENSED | OUTPATIENT
Start: 2022-11-15 | End: 2022-11-22

## 2022-11-15 RX ORDER — CARVEDILOL 12.5 MG/1
12.5 TABLET ORAL 2 TIMES DAILY WITH MEALS
Status: DISCONTINUED | OUTPATIENT
Start: 2022-11-15 | End: 2022-11-17

## 2022-11-15 RX ADMIN — LEVOTHYROXINE SODIUM 275 MCG: 0.17 TABLET ORAL at 06:53

## 2022-11-15 RX ADMIN — APIXABAN 2.5 MG: 2.5 TABLET, FILM COATED ORAL at 08:11

## 2022-11-15 RX ADMIN — PANTOPRAZOLE SODIUM 40 MG: 40 TABLET, DELAYED RELEASE ORAL at 08:11

## 2022-11-15 RX ADMIN — SERTRALINE 150 MG: 100 TABLET, FILM COATED ORAL at 08:11

## 2022-11-15 RX ADMIN — APIXABAN 2.5 MG: 2.5 TABLET, FILM COATED ORAL at 20:16

## 2022-11-15 RX ADMIN — CARVEDILOL 12.5 MG: 12.5 TABLET, FILM COATED ORAL at 17:31

## 2022-11-15 RX ADMIN — OXYCODONE HYDROCHLORIDE AND ACETAMINOPHEN 1 TABLET: 7.5; 325 TABLET ORAL at 08:11

## 2022-11-15 RX ADMIN — Medication 3 MG: at 20:15

## 2022-11-15 RX ADMIN — ROPINIROLE 2 MG: 2 TABLET, FILM COATED ORAL at 20:16

## 2022-11-15 RX ADMIN — MICONAZOLE NITRATE 1 APPLICATION: 20 CREAM TOPICAL at 10:39

## 2022-11-15 RX ADMIN — OXYCODONE HYDROCHLORIDE AND ACETAMINOPHEN 1 TABLET: 7.5; 325 TABLET ORAL at 17:31

## 2022-11-15 RX ADMIN — MICONAZOLE NITRATE 1 APPLICATION: 20 CREAM TOPICAL at 21:39

## 2022-11-15 RX ADMIN — INSULIN GLARGINE-YFGN 4 UNITS: 100 INJECTION, SOLUTION SUBCUTANEOUS at 20:16

## 2022-11-15 RX ADMIN — OXYCODONE HYDROCHLORIDE AND ACETAMINOPHEN 1 TABLET: 7.5; 325 TABLET ORAL at 13:04

## 2022-11-15 RX ADMIN — FOLIC ACID 1 MG: 1 TABLET ORAL at 08:11

## 2022-11-15 RX ADMIN — OXYCODONE AND ACETAMINOPHEN 1 TABLET: 5; 325 TABLET ORAL at 01:55

## 2022-11-15 RX ADMIN — AMLODIPINE BESYLATE 2.5 MG: 2.5 TABLET ORAL at 10:37

## 2022-11-16 LAB
ANION GAP SERPL CALCULATED.3IONS-SCNC: 6 MMOL/L (ref 5–15)
BUN SERPL-MCNC: 19 MG/DL (ref 6–20)
BUN/CREAT SERPL: 6.9 (ref 7–25)
CALCIUM SPEC-SCNC: 7.5 MG/DL (ref 8.6–10.5)
CHLORIDE SERPL-SCNC: 103 MMOL/L (ref 98–107)
CO2 SERPL-SCNC: 21 MMOL/L (ref 22–29)
CREAT SERPL-MCNC: 2.74 MG/DL (ref 0.57–1)
DEPRECATED RDW RBC AUTO: 48.2 FL (ref 37–54)
EGFRCR SERPLBLD CKD-EPI 2021: 19.6 ML/MIN/1.73
ERYTHROCYTE [DISTWIDTH] IN BLOOD BY AUTOMATED COUNT: 15.2 % (ref 12.3–15.4)
GLUCOSE BLDC GLUCOMTR-MCNC: 142 MG/DL (ref 70–130)
GLUCOSE BLDC GLUCOMTR-MCNC: 149 MG/DL (ref 70–130)
GLUCOSE BLDC GLUCOMTR-MCNC: 193 MG/DL (ref 70–130)
GLUCOSE SERPL-MCNC: 132 MG/DL (ref 65–99)
HCT VFR BLD AUTO: 27.7 % (ref 34–46.6)
HGB BLD-MCNC: 8.6 G/DL (ref 12–15.9)
MCH RBC QN AUTO: 27.7 PG (ref 26.6–33)
MCHC RBC AUTO-ENTMCNC: 31 G/DL (ref 31.5–35.7)
MCV RBC AUTO: 89.4 FL (ref 79–97)
PLATELET # BLD AUTO: 202 10*3/MM3 (ref 140–450)
PMV BLD AUTO: 9.9 FL (ref 6–12)
POTASSIUM SERPL-SCNC: 4.3 MMOL/L (ref 3.5–5.2)
RBC # BLD AUTO: 3.1 10*6/MM3 (ref 3.77–5.28)
SODIUM SERPL-SCNC: 130 MMOL/L (ref 136–145)
WBC NRBC COR # BLD: 9.31 10*3/MM3 (ref 3.4–10.8)

## 2022-11-16 PROCEDURE — 99232 SBSQ HOSP IP/OBS MODERATE 35: CPT | Performed by: NURSE PRACTITIONER

## 2022-11-16 PROCEDURE — 85027 COMPLETE CBC AUTOMATED: CPT | Performed by: STUDENT IN AN ORGANIZED HEALTH CARE EDUCATION/TRAINING PROGRAM

## 2022-11-16 PROCEDURE — 82962 GLUCOSE BLOOD TEST: CPT

## 2022-11-16 PROCEDURE — 80048 BASIC METABOLIC PNL TOTAL CA: CPT | Performed by: STUDENT IN AN ORGANIZED HEALTH CARE EDUCATION/TRAINING PROGRAM

## 2022-11-16 RX ADMIN — Medication 3 MG: at 20:10

## 2022-11-16 RX ADMIN — CARVEDILOL 12.5 MG: 12.5 TABLET, FILM COATED ORAL at 17:50

## 2022-11-16 RX ADMIN — APIXABAN 2.5 MG: 2.5 TABLET, FILM COATED ORAL at 20:10

## 2022-11-16 RX ADMIN — OXYCODONE HYDROCHLORIDE AND ACETAMINOPHEN 1 TABLET: 7.5; 325 TABLET ORAL at 09:12

## 2022-11-16 RX ADMIN — OXYCODONE HYDROCHLORIDE AND ACETAMINOPHEN 1 TABLET: 7.5; 325 TABLET ORAL at 14:26

## 2022-11-16 RX ADMIN — OXYCODONE HYDROCHLORIDE AND ACETAMINOPHEN 1 TABLET: 7.5; 325 TABLET ORAL at 18:32

## 2022-11-16 RX ADMIN — LEVOTHYROXINE SODIUM 275 MCG: 0.17 TABLET ORAL at 06:18

## 2022-11-16 RX ADMIN — OXYCODONE HYDROCHLORIDE AND ACETAMINOPHEN 1 TABLET: 7.5; 325 TABLET ORAL at 23:49

## 2022-11-16 RX ADMIN — PANTOPRAZOLE SODIUM 40 MG: 40 TABLET, DELAYED RELEASE ORAL at 14:26

## 2022-11-16 RX ADMIN — FOLIC ACID 1 MG: 1 TABLET ORAL at 14:26

## 2022-11-16 RX ADMIN — OXYCODONE HYDROCHLORIDE AND ACETAMINOPHEN 1 TABLET: 7.5; 325 TABLET ORAL at 00:37

## 2022-11-16 RX ADMIN — OXYCODONE HYDROCHLORIDE AND ACETAMINOPHEN 1 TABLET: 7.5; 325 TABLET ORAL at 04:29

## 2022-11-16 RX ADMIN — SERTRALINE 150 MG: 100 TABLET, FILM COATED ORAL at 14:26

## 2022-11-16 RX ADMIN — ROPINIROLE 2 MG: 2 TABLET, FILM COATED ORAL at 20:10

## 2022-11-16 RX ADMIN — MICONAZOLE NITRATE 1 APPLICATION: 20 CREAM TOPICAL at 21:42

## 2022-11-16 RX ADMIN — INSULIN GLARGINE-YFGN 4 UNITS: 100 INJECTION, SOLUTION SUBCUTANEOUS at 20:09

## 2022-11-16 RX ADMIN — AMLODIPINE BESYLATE 2.5 MG: 2.5 TABLET ORAL at 14:26

## 2022-11-17 LAB
ANION GAP SERPL CALCULATED.3IONS-SCNC: 8 MMOL/L (ref 5–15)
BUN SERPL-MCNC: 17 MG/DL (ref 6–20)
BUN/CREAT SERPL: 7.9 (ref 7–25)
CALCIUM SPEC-SCNC: 7.7 MG/DL (ref 8.6–10.5)
CHLORIDE SERPL-SCNC: 101 MMOL/L (ref 98–107)
CO2 SERPL-SCNC: 24 MMOL/L (ref 22–29)
CREAT SERPL-MCNC: 2.14 MG/DL (ref 0.57–1)
DEPRECATED RDW RBC AUTO: 49.2 FL (ref 37–54)
EGFRCR SERPLBLD CKD-EPI 2021: 26.4 ML/MIN/1.73
ERYTHROCYTE [DISTWIDTH] IN BLOOD BY AUTOMATED COUNT: 15.3 % (ref 12.3–15.4)
GLUCOSE BLDC GLUCOMTR-MCNC: 123 MG/DL (ref 70–130)
GLUCOSE BLDC GLUCOMTR-MCNC: 151 MG/DL (ref 70–130)
GLUCOSE BLDC GLUCOMTR-MCNC: 194 MG/DL (ref 70–130)
GLUCOSE BLDC GLUCOMTR-MCNC: 246 MG/DL (ref 70–130)
GLUCOSE SERPL-MCNC: 246 MG/DL (ref 65–99)
HCT VFR BLD AUTO: 29.2 % (ref 34–46.6)
HGB BLD-MCNC: 9.2 G/DL (ref 12–15.9)
MCH RBC QN AUTO: 27.8 PG (ref 26.6–33)
MCHC RBC AUTO-ENTMCNC: 31.5 G/DL (ref 31.5–35.7)
MCV RBC AUTO: 88.2 FL (ref 79–97)
PLATELET # BLD AUTO: 230 10*3/MM3 (ref 140–450)
PMV BLD AUTO: 9.9 FL (ref 6–12)
POTASSIUM SERPL-SCNC: 4.1 MMOL/L (ref 3.5–5.2)
RBC # BLD AUTO: 3.31 10*6/MM3 (ref 3.77–5.28)
SODIUM SERPL-SCNC: 133 MMOL/L (ref 136–145)
WBC NRBC COR # BLD: 9.26 10*3/MM3 (ref 3.4–10.8)

## 2022-11-17 PROCEDURE — 97530 THERAPEUTIC ACTIVITIES: CPT

## 2022-11-17 PROCEDURE — 97110 THERAPEUTIC EXERCISES: CPT

## 2022-11-17 PROCEDURE — 85027 COMPLETE CBC AUTOMATED: CPT | Performed by: STUDENT IN AN ORGANIZED HEALTH CARE EDUCATION/TRAINING PROGRAM

## 2022-11-17 PROCEDURE — 80048 BASIC METABOLIC PNL TOTAL CA: CPT | Performed by: STUDENT IN AN ORGANIZED HEALTH CARE EDUCATION/TRAINING PROGRAM

## 2022-11-17 PROCEDURE — 63710000001 INSULIN LISPRO (HUMAN) PER 5 UNITS: Performed by: HOSPITALIST

## 2022-11-17 PROCEDURE — 82962 GLUCOSE BLOOD TEST: CPT

## 2022-11-17 PROCEDURE — 99232 SBSQ HOSP IP/OBS MODERATE 35: CPT | Performed by: NURSE PRACTITIONER

## 2022-11-17 RX ORDER — CARVEDILOL 25 MG/1
25 TABLET ORAL 2 TIMES DAILY WITH MEALS
Status: DISCONTINUED | OUTPATIENT
Start: 2022-11-17 | End: 2022-11-24 | Stop reason: HOSPADM

## 2022-11-17 RX ORDER — CARVEDILOL 12.5 MG/1
12.5 TABLET ORAL ONCE
Status: COMPLETED | OUTPATIENT
Start: 2022-11-17 | End: 2022-11-17

## 2022-11-17 RX ORDER — AMLODIPINE BESYLATE 5 MG/1
5 TABLET ORAL
Status: DISCONTINUED | OUTPATIENT
Start: 2022-11-18 | End: 2022-11-24 | Stop reason: HOSPADM

## 2022-11-17 RX ADMIN — MICONAZOLE NITRATE 1 APPLICATION: 20 CREAM TOPICAL at 11:55

## 2022-11-17 RX ADMIN — INSULIN LISPRO 2 UNITS: 100 INJECTION, SOLUTION INTRAVENOUS; SUBCUTANEOUS at 11:52

## 2022-11-17 RX ADMIN — CARVEDILOL 12.5 MG: 12.5 TABLET, FILM COATED ORAL at 11:52

## 2022-11-17 RX ADMIN — INSULIN GLARGINE-YFGN 6 UNITS: 100 INJECTION, SOLUTION SUBCUTANEOUS at 21:01

## 2022-11-17 RX ADMIN — APIXABAN 2.5 MG: 2.5 TABLET, FILM COATED ORAL at 21:01

## 2022-11-17 RX ADMIN — OXYCODONE HYDROCHLORIDE AND ACETAMINOPHEN 1 TABLET: 7.5; 325 TABLET ORAL at 04:19

## 2022-11-17 RX ADMIN — OXYCODONE HYDROCHLORIDE AND ACETAMINOPHEN 1 TABLET: 7.5; 325 TABLET ORAL at 08:47

## 2022-11-17 RX ADMIN — ROPINIROLE 2 MG: 2 TABLET, FILM COATED ORAL at 21:01

## 2022-11-17 RX ADMIN — LEVOTHYROXINE SODIUM 275 MCG: 0.17 TABLET ORAL at 06:07

## 2022-11-17 RX ADMIN — INSULIN LISPRO 2 UNITS: 100 INJECTION, SOLUTION INTRAVENOUS; SUBCUTANEOUS at 17:02

## 2022-11-17 RX ADMIN — AMLODIPINE BESYLATE 2.5 MG: 2.5 TABLET ORAL at 08:46

## 2022-11-17 RX ADMIN — PANTOPRAZOLE SODIUM 40 MG: 40 TABLET, DELAYED RELEASE ORAL at 08:46

## 2022-11-17 RX ADMIN — SERTRALINE 150 MG: 100 TABLET, FILM COATED ORAL at 08:46

## 2022-11-17 RX ADMIN — CARVEDILOL 25 MG: 25 TABLET, FILM COATED ORAL at 17:02

## 2022-11-17 RX ADMIN — OXYCODONE HYDROCHLORIDE AND ACETAMINOPHEN 1 TABLET: 7.5; 325 TABLET ORAL at 21:01

## 2022-11-17 RX ADMIN — INSULIN LISPRO 3 UNITS: 100 INJECTION, SOLUTION INTRAVENOUS; SUBCUTANEOUS at 08:47

## 2022-11-17 RX ADMIN — Medication 3 MG: at 21:01

## 2022-11-17 RX ADMIN — FOLIC ACID 1 MG: 1 TABLET ORAL at 08:46

## 2022-11-17 RX ADMIN — OXYCODONE HYDROCHLORIDE AND ACETAMINOPHEN 1 TABLET: 7.5; 325 TABLET ORAL at 17:02

## 2022-11-17 RX ADMIN — APIXABAN 2.5 MG: 2.5 TABLET, FILM COATED ORAL at 08:46

## 2022-11-17 RX ADMIN — CARVEDILOL 12.5 MG: 12.5 TABLET, FILM COATED ORAL at 08:46

## 2022-11-17 RX ADMIN — OXYCODONE HYDROCHLORIDE AND ACETAMINOPHEN 1 TABLET: 7.5; 325 TABLET ORAL at 12:51

## 2022-11-17 RX ADMIN — MICONAZOLE NITRATE 1 APPLICATION: 20 CREAM TOPICAL at 23:32

## 2022-11-18 LAB
ANION GAP SERPL CALCULATED.3IONS-SCNC: 8.6 MMOL/L (ref 5–15)
BUN SERPL-MCNC: 28 MG/DL (ref 6–20)
BUN/CREAT SERPL: 10.4 (ref 7–25)
CALCIUM SPEC-SCNC: 7.8 MG/DL (ref 8.6–10.5)
CHLORIDE SERPL-SCNC: 98 MMOL/L (ref 98–107)
CO2 SERPL-SCNC: 22.4 MMOL/L (ref 22–29)
CREAT SERPL-MCNC: 2.69 MG/DL (ref 0.57–1)
DEPRECATED RDW RBC AUTO: 46.9 FL (ref 37–54)
EGFRCR SERPLBLD CKD-EPI 2021: 20.1 ML/MIN/1.73
ERYTHROCYTE [DISTWIDTH] IN BLOOD BY AUTOMATED COUNT: 15.4 % (ref 12.3–15.4)
GLUCOSE BLDC GLUCOMTR-MCNC: 134 MG/DL (ref 70–130)
GLUCOSE BLDC GLUCOMTR-MCNC: 164 MG/DL (ref 70–130)
GLUCOSE BLDC GLUCOMTR-MCNC: 200 MG/DL (ref 70–130)
GLUCOSE SERPL-MCNC: 148 MG/DL (ref 65–99)
HCT VFR BLD AUTO: 29.8 % (ref 34–46.6)
HGB BLD-MCNC: 9.7 G/DL (ref 12–15.9)
MCH RBC QN AUTO: 27.2 PG (ref 26.6–33)
MCHC RBC AUTO-ENTMCNC: 32.6 G/DL (ref 31.5–35.7)
MCV RBC AUTO: 83.5 FL (ref 79–97)
PLATELET # BLD AUTO: 231 10*3/MM3 (ref 140–450)
PMV BLD AUTO: 9.6 FL (ref 6–12)
POTASSIUM SERPL-SCNC: 4.8 MMOL/L (ref 3.5–5.2)
RBC # BLD AUTO: 3.57 10*6/MM3 (ref 3.77–5.28)
SODIUM SERPL-SCNC: 129 MMOL/L (ref 136–145)
WBC NRBC COR # BLD: 10.42 10*3/MM3 (ref 3.4–10.8)

## 2022-11-18 PROCEDURE — 97110 THERAPEUTIC EXERCISES: CPT

## 2022-11-18 PROCEDURE — 63710000001 INSULIN LISPRO (HUMAN) PER 5 UNITS: Performed by: HOSPITALIST

## 2022-11-18 PROCEDURE — 97116 GAIT TRAINING THERAPY: CPT

## 2022-11-18 PROCEDURE — 85027 COMPLETE CBC AUTOMATED: CPT | Performed by: STUDENT IN AN ORGANIZED HEALTH CARE EDUCATION/TRAINING PROGRAM

## 2022-11-18 PROCEDURE — 25010000002 HEPARIN (PORCINE) PER 1000 UNITS: Performed by: HOSPITALIST

## 2022-11-18 PROCEDURE — 80048 BASIC METABOLIC PNL TOTAL CA: CPT | Performed by: STUDENT IN AN ORGANIZED HEALTH CARE EDUCATION/TRAINING PROGRAM

## 2022-11-18 PROCEDURE — 82962 GLUCOSE BLOOD TEST: CPT

## 2022-11-18 PROCEDURE — 25010000002 ONDANSETRON PER 1 MG: Performed by: ORTHOPAEDIC SURGERY

## 2022-11-18 RX ADMIN — CARVEDILOL 25 MG: 25 TABLET, FILM COATED ORAL at 17:07

## 2022-11-18 RX ADMIN — INSULIN LISPRO 3 UNITS: 100 INJECTION, SOLUTION INTRAVENOUS; SUBCUTANEOUS at 17:07

## 2022-11-18 RX ADMIN — APIXABAN 2.5 MG: 2.5 TABLET, FILM COATED ORAL at 21:05

## 2022-11-18 RX ADMIN — PANTOPRAZOLE SODIUM 40 MG: 40 TABLET, DELAYED RELEASE ORAL at 13:28

## 2022-11-18 RX ADMIN — INSULIN GLARGINE-YFGN 6 UNITS: 100 INJECTION, SOLUTION SUBCUTANEOUS at 21:05

## 2022-11-18 RX ADMIN — LEVOTHYROXINE SODIUM 275 MCG: 0.17 TABLET ORAL at 06:17

## 2022-11-18 RX ADMIN — FOLIC ACID 1 MG: 1 TABLET ORAL at 13:28

## 2022-11-18 RX ADMIN — ONDANSETRON 4 MG: 2 INJECTION INTRAMUSCULAR; INTRAVENOUS at 00:32

## 2022-11-18 RX ADMIN — OXYCODONE HYDROCHLORIDE AND ACETAMINOPHEN 1 TABLET: 7.5; 325 TABLET ORAL at 17:07

## 2022-11-18 RX ADMIN — HEPARIN SODIUM 3800 UNITS: 1000 INJECTION INTRAVENOUS; SUBCUTANEOUS at 10:21

## 2022-11-18 RX ADMIN — ROPINIROLE 2 MG: 2 TABLET, FILM COATED ORAL at 21:05

## 2022-11-18 RX ADMIN — SERTRALINE 150 MG: 100 TABLET, FILM COATED ORAL at 13:28

## 2022-11-18 RX ADMIN — OXYCODONE HYDROCHLORIDE AND ACETAMINOPHEN 1 TABLET: 7.5; 325 TABLET ORAL at 01:13

## 2022-11-18 RX ADMIN — MICONAZOLE NITRATE 1 APPLICATION: 20 CREAM TOPICAL at 21:06

## 2022-11-18 RX ADMIN — OXYCODONE HYDROCHLORIDE AND ACETAMINOPHEN 1 TABLET: 7.5; 325 TABLET ORAL at 06:17

## 2022-11-18 RX ADMIN — OXYCODONE HYDROCHLORIDE AND ACETAMINOPHEN 1 TABLET: 7.5; 325 TABLET ORAL at 12:49

## 2022-11-18 RX ADMIN — Medication 3 MG: at 21:05

## 2022-11-18 RX ADMIN — OXYCODONE HYDROCHLORIDE AND ACETAMINOPHEN 1 TABLET: 7.5; 325 TABLET ORAL at 21:05

## 2022-11-19 LAB
ALBUMIN SERPL-MCNC: 2.4 G/DL (ref 3.5–5.2)
ANION GAP SERPL CALCULATED.3IONS-SCNC: 7.9 MMOL/L (ref 5–15)
BUN SERPL-MCNC: 25 MG/DL (ref 6–20)
BUN/CREAT SERPL: 9.7 (ref 7–25)
CALCIUM SPEC-SCNC: 7.8 MG/DL (ref 8.6–10.5)
CHLORIDE SERPL-SCNC: 101 MMOL/L (ref 98–107)
CO2 SERPL-SCNC: 23.1 MMOL/L (ref 22–29)
CREAT SERPL-MCNC: 2.59 MG/DL (ref 0.57–1)
EGFRCR SERPLBLD CKD-EPI 2021: 21 ML/MIN/1.73
GLUCOSE BLDC GLUCOMTR-MCNC: 167 MG/DL (ref 70–130)
GLUCOSE BLDC GLUCOMTR-MCNC: 228 MG/DL (ref 70–130)
GLUCOSE BLDC GLUCOMTR-MCNC: 363 MG/DL (ref 70–130)
GLUCOSE BLDC GLUCOMTR-MCNC: 78 MG/DL (ref 70–130)
GLUCOSE SERPL-MCNC: 360 MG/DL (ref 65–99)
PHOSPHATE SERPL-MCNC: 2.2 MG/DL (ref 2.5–4.5)
POTASSIUM SERPL-SCNC: 4.6 MMOL/L (ref 3.5–5.2)
SODIUM SERPL-SCNC: 132 MMOL/L (ref 136–145)

## 2022-11-19 PROCEDURE — 25010000002 ONDANSETRON PER 1 MG: Performed by: ORTHOPAEDIC SURGERY

## 2022-11-19 PROCEDURE — 80069 RENAL FUNCTION PANEL: CPT | Performed by: HOSPITALIST

## 2022-11-19 PROCEDURE — 82962 GLUCOSE BLOOD TEST: CPT

## 2022-11-19 PROCEDURE — 63710000001 INSULIN LISPRO (HUMAN) PER 5 UNITS: Performed by: HOSPITALIST

## 2022-11-19 RX ADMIN — MICONAZOLE NITRATE 1 APPLICATION: 20 CREAM TOPICAL at 20:48

## 2022-11-19 RX ADMIN — LEVOTHYROXINE SODIUM 275 MCG: 0.17 TABLET ORAL at 06:17

## 2022-11-19 RX ADMIN — FOLIC ACID 1 MG: 1 TABLET ORAL at 09:57

## 2022-11-19 RX ADMIN — SERTRALINE 150 MG: 100 TABLET, FILM COATED ORAL at 09:57

## 2022-11-19 RX ADMIN — Medication 3 MG: at 20:45

## 2022-11-19 RX ADMIN — APIXABAN 2.5 MG: 2.5 TABLET, FILM COATED ORAL at 09:57

## 2022-11-19 RX ADMIN — PANTOPRAZOLE SODIUM 40 MG: 40 TABLET, DELAYED RELEASE ORAL at 09:57

## 2022-11-19 RX ADMIN — OXYCODONE HYDROCHLORIDE AND ACETAMINOPHEN 1 TABLET: 7.5; 325 TABLET ORAL at 01:32

## 2022-11-19 RX ADMIN — CARVEDILOL 25 MG: 25 TABLET, FILM COATED ORAL at 18:51

## 2022-11-19 RX ADMIN — OXYCODONE HYDROCHLORIDE AND ACETAMINOPHEN 1 TABLET: 7.5; 325 TABLET ORAL at 18:51

## 2022-11-19 RX ADMIN — APIXABAN 2.5 MG: 2.5 TABLET, FILM COATED ORAL at 20:45

## 2022-11-19 RX ADMIN — INSULIN LISPRO 6 UNITS: 100 INJECTION, SOLUTION INTRAVENOUS; SUBCUTANEOUS at 09:57

## 2022-11-19 RX ADMIN — CARVEDILOL 25 MG: 25 TABLET, FILM COATED ORAL at 09:57

## 2022-11-19 RX ADMIN — ROPINIROLE 2 MG: 2 TABLET, FILM COATED ORAL at 20:45

## 2022-11-19 RX ADMIN — MICONAZOLE NITRATE 1 APPLICATION: 20 CREAM TOPICAL at 09:58

## 2022-11-19 RX ADMIN — ONDANSETRON 4 MG: 2 INJECTION INTRAMUSCULAR; INTRAVENOUS at 20:45

## 2022-11-19 RX ADMIN — OXYCODONE HYDROCHLORIDE AND ACETAMINOPHEN 1 TABLET: 7.5; 325 TABLET ORAL at 05:17

## 2022-11-19 RX ADMIN — INSULIN GLARGINE-YFGN 6 UNITS: 100 INJECTION, SOLUTION SUBCUTANEOUS at 20:47

## 2022-11-19 RX ADMIN — INSULIN LISPRO 3 UNITS: 100 INJECTION, SOLUTION INTRAVENOUS; SUBCUTANEOUS at 13:01

## 2022-11-19 RX ADMIN — OXYCODONE HYDROCHLORIDE AND ACETAMINOPHEN 1 TABLET: 7.5; 325 TABLET ORAL at 09:57

## 2022-11-19 RX ADMIN — AMLODIPINE BESYLATE 5 MG: 5 TABLET ORAL at 09:57

## 2022-11-19 RX ADMIN — OXYCODONE HYDROCHLORIDE AND ACETAMINOPHEN 1 TABLET: 7.5; 325 TABLET ORAL at 13:04

## 2022-11-20 ENCOUNTER — APPOINTMENT (OUTPATIENT)
Dept: GENERAL RADIOLOGY | Facility: HOSPITAL | Age: 57
End: 2022-11-20

## 2022-11-20 LAB
ANION GAP SERPL CALCULATED.3IONS-SCNC: 10.9 MMOL/L (ref 5–15)
BUN SERPL-MCNC: 40 MG/DL (ref 6–20)
BUN/CREAT SERPL: 12.5 (ref 7–25)
CALCIUM SPEC-SCNC: 7.8 MG/DL (ref 8.6–10.5)
CHLORIDE SERPL-SCNC: 100 MMOL/L (ref 98–107)
CO2 SERPL-SCNC: 20.1 MMOL/L (ref 22–29)
CREAT SERPL-MCNC: 3.19 MG/DL (ref 0.57–1)
DEPRECATED RDW RBC AUTO: 49.3 FL (ref 37–54)
EGFRCR SERPLBLD CKD-EPI 2021: 16.4 ML/MIN/1.73
ERYTHROCYTE [DISTWIDTH] IN BLOOD BY AUTOMATED COUNT: 15.7 % (ref 12.3–15.4)
GLUCOSE BLDC GLUCOMTR-MCNC: 121 MG/DL (ref 70–130)
GLUCOSE BLDC GLUCOMTR-MCNC: 128 MG/DL (ref 70–130)
GLUCOSE BLDC GLUCOMTR-MCNC: 143 MG/DL (ref 70–130)
GLUCOSE BLDC GLUCOMTR-MCNC: 68 MG/DL (ref 70–130)
GLUCOSE BLDC GLUCOMTR-MCNC: 78 MG/DL (ref 70–130)
GLUCOSE SERPL-MCNC: 76 MG/DL (ref 65–99)
HCT VFR BLD AUTO: 24.7 % (ref 34–46.6)
HCT VFR BLD AUTO: 25.7 % (ref 34–46.6)
HGB BLD-MCNC: 7.7 G/DL (ref 12–15.9)
HGB BLD-MCNC: 7.8 G/DL (ref 12–15.9)
MCH RBC QN AUTO: 27.4 PG (ref 26.6–33)
MCHC RBC AUTO-ENTMCNC: 31.6 G/DL (ref 31.5–35.7)
MCV RBC AUTO: 86.7 FL (ref 79–97)
PLATELET # BLD AUTO: 230 10*3/MM3 (ref 140–450)
PMV BLD AUTO: 10 FL (ref 6–12)
POTASSIUM SERPL-SCNC: 5.1 MMOL/L (ref 3.5–5.2)
RBC # BLD AUTO: 2.85 10*6/MM3 (ref 3.77–5.28)
SODIUM SERPL-SCNC: 131 MMOL/L (ref 136–145)
WBC NRBC COR # BLD: 11.8 10*3/MM3 (ref 3.4–10.8)

## 2022-11-20 PROCEDURE — 85027 COMPLETE CBC AUTOMATED: CPT | Performed by: STUDENT IN AN ORGANIZED HEALTH CARE EDUCATION/TRAINING PROGRAM

## 2022-11-20 PROCEDURE — 25010000002 ONDANSETRON PER 1 MG: Performed by: ORTHOPAEDIC SURGERY

## 2022-11-20 PROCEDURE — 85014 HEMATOCRIT: CPT | Performed by: HOSPITALIST

## 2022-11-20 PROCEDURE — 74018 RADEX ABDOMEN 1 VIEW: CPT

## 2022-11-20 PROCEDURE — 85018 HEMOGLOBIN: CPT | Performed by: HOSPITALIST

## 2022-11-20 PROCEDURE — 82962 GLUCOSE BLOOD TEST: CPT

## 2022-11-20 PROCEDURE — 80048 BASIC METABOLIC PNL TOTAL CA: CPT | Performed by: STUDENT IN AN ORGANIZED HEALTH CARE EDUCATION/TRAINING PROGRAM

## 2022-11-20 RX ORDER — MORPHINE SULFATE 2 MG/ML
1 INJECTION, SOLUTION INTRAMUSCULAR; INTRAVENOUS ONCE
Status: DISCONTINUED | OUTPATIENT
Start: 2022-11-20 | End: 2022-11-24 | Stop reason: HOSPADM

## 2022-11-20 RX ADMIN — APIXABAN 2.5 MG: 2.5 TABLET, FILM COATED ORAL at 09:59

## 2022-11-20 RX ADMIN — CARVEDILOL 25 MG: 25 TABLET, FILM COATED ORAL at 17:22

## 2022-11-20 RX ADMIN — OXYCODONE HYDROCHLORIDE AND ACETAMINOPHEN 1 TABLET: 7.5; 325 TABLET ORAL at 10:03

## 2022-11-20 RX ADMIN — ONDANSETRON 4 MG: 2 INJECTION INTRAMUSCULAR; INTRAVENOUS at 18:07

## 2022-11-20 RX ADMIN — OXYCODONE HYDROCHLORIDE AND ACETAMINOPHEN 1 TABLET: 7.5; 325 TABLET ORAL at 18:07

## 2022-11-20 RX ADMIN — APIXABAN 2.5 MG: 2.5 TABLET, FILM COATED ORAL at 20:35

## 2022-11-20 RX ADMIN — LEVOTHYROXINE SODIUM 275 MCG: 0.17 TABLET ORAL at 05:53

## 2022-11-20 RX ADMIN — MICONAZOLE NITRATE 1 APPLICATION: 20 CREAM TOPICAL at 21:59

## 2022-11-20 RX ADMIN — OXYCODONE HYDROCHLORIDE AND ACETAMINOPHEN 1 TABLET: 7.5; 325 TABLET ORAL at 05:53

## 2022-11-20 RX ADMIN — OXYCODONE HYDROCHLORIDE AND ACETAMINOPHEN 1 TABLET: 7.5; 325 TABLET ORAL at 13:59

## 2022-11-20 RX ADMIN — ONDANSETRON 4 MG: 2 INJECTION INTRAMUSCULAR; INTRAVENOUS at 07:22

## 2022-11-20 RX ADMIN — MICONAZOLE NITRATE 1 APPLICATION: 20 CREAM TOPICAL at 09:59

## 2022-11-20 RX ADMIN — CARVEDILOL 25 MG: 25 TABLET, FILM COATED ORAL at 09:59

## 2022-11-20 RX ADMIN — Medication 3 MG: at 20:36

## 2022-11-20 RX ADMIN — OXYCODONE HYDROCHLORIDE AND ACETAMINOPHEN 1 TABLET: 7.5; 325 TABLET ORAL at 00:39

## 2022-11-20 RX ADMIN — AMLODIPINE BESYLATE 5 MG: 5 TABLET ORAL at 09:59

## 2022-11-20 RX ADMIN — ROPINIROLE 2 MG: 2 TABLET, FILM COATED ORAL at 20:35

## 2022-11-20 RX ADMIN — OXYCODONE HYDROCHLORIDE AND ACETAMINOPHEN 1 TABLET: 7.5; 325 TABLET ORAL at 21:59

## 2022-11-20 RX ADMIN — POLYETHYLENE GLYCOL 3350 17 G: 17 POWDER, FOR SOLUTION ORAL at 18:38

## 2022-11-20 RX ADMIN — SERTRALINE 150 MG: 100 TABLET, FILM COATED ORAL at 09:59

## 2022-11-21 LAB
ANION GAP SERPL CALCULATED.3IONS-SCNC: 10 MMOL/L (ref 5–15)
BUN SERPL-MCNC: 50 MG/DL (ref 6–20)
BUN/CREAT SERPL: 14.3 (ref 7–25)
CALCIUM SPEC-SCNC: 8 MG/DL (ref 8.6–10.5)
CHLORIDE SERPL-SCNC: 98 MMOL/L (ref 98–107)
CO2 SERPL-SCNC: 21 MMOL/L (ref 22–29)
CREAT SERPL-MCNC: 3.5 MG/DL (ref 0.57–1)
EGFRCR SERPLBLD CKD-EPI 2021: 14.6 ML/MIN/1.73
GLUCOSE BLDC GLUCOMTR-MCNC: 115 MG/DL (ref 70–130)
GLUCOSE BLDC GLUCOMTR-MCNC: 130 MG/DL (ref 70–130)
GLUCOSE BLDC GLUCOMTR-MCNC: 136 MG/DL (ref 70–130)
GLUCOSE BLDC GLUCOMTR-MCNC: 170 MG/DL (ref 70–130)
GLUCOSE SERPL-MCNC: 135 MG/DL (ref 65–99)
HCT VFR BLD AUTO: 24.5 % (ref 34–46.6)
HGB BLD-MCNC: 8 G/DL (ref 12–15.9)
POTASSIUM SERPL-SCNC: 5.8 MMOL/L (ref 3.5–5.2)
SODIUM SERPL-SCNC: 129 MMOL/L (ref 136–145)

## 2022-11-21 PROCEDURE — 82962 GLUCOSE BLOOD TEST: CPT

## 2022-11-21 PROCEDURE — 25010000002 HEPARIN (PORCINE) PER 1000 UNITS: Performed by: HOSPITALIST

## 2022-11-21 PROCEDURE — 85018 HEMOGLOBIN: CPT | Performed by: HOSPITALIST

## 2022-11-21 PROCEDURE — 85014 HEMATOCRIT: CPT | Performed by: HOSPITALIST

## 2022-11-21 PROCEDURE — 80048 BASIC METABOLIC PNL TOTAL CA: CPT | Performed by: STUDENT IN AN ORGANIZED HEALTH CARE EDUCATION/TRAINING PROGRAM

## 2022-11-21 PROCEDURE — 63710000001 INSULIN LISPRO (HUMAN) PER 5 UNITS: Performed by: HOSPITALIST

## 2022-11-21 RX ORDER — CEFAZOLIN SODIUM 2 G/100ML
2 INJECTION, SOLUTION INTRAVENOUS ONCE
Status: CANCELLED | OUTPATIENT
Start: 2022-11-22 | End: 2022-11-21

## 2022-11-21 RX ORDER — AMOXICILLIN 250 MG
2 CAPSULE ORAL NIGHTLY
Status: DISCONTINUED | OUTPATIENT
Start: 2022-11-21 | End: 2022-11-24 | Stop reason: HOSPADM

## 2022-11-21 RX ORDER — LACTULOSE 10 G/15ML
10 SOLUTION ORAL 2 TIMES DAILY
Status: DISCONTINUED | OUTPATIENT
Start: 2022-11-21 | End: 2022-11-24 | Stop reason: HOSPADM

## 2022-11-21 RX ADMIN — SERTRALINE 150 MG: 100 TABLET, FILM COATED ORAL at 12:56

## 2022-11-21 RX ADMIN — DOCUSATE SODIUM 50MG AND SENNOSIDES 8.6MG 2 TABLET: 8.6; 5 TABLET, FILM COATED ORAL at 20:33

## 2022-11-21 RX ADMIN — INSULIN LISPRO 2 UNITS: 100 INJECTION, SOLUTION INTRAVENOUS; SUBCUTANEOUS at 17:22

## 2022-11-21 RX ADMIN — HEPARIN SODIUM 3800 UNITS: 1000 INJECTION INTRAVENOUS; SUBCUTANEOUS at 12:31

## 2022-11-21 RX ADMIN — OXYCODONE HYDROCHLORIDE AND ACETAMINOPHEN 1 TABLET: 7.5; 325 TABLET ORAL at 02:51

## 2022-11-21 RX ADMIN — OXYCODONE HYDROCHLORIDE AND ACETAMINOPHEN 1 TABLET: 7.5; 325 TABLET ORAL at 12:56

## 2022-11-21 RX ADMIN — OXYCODONE HYDROCHLORIDE AND ACETAMINOPHEN 1 TABLET: 7.5; 325 TABLET ORAL at 08:14

## 2022-11-21 RX ADMIN — MICONAZOLE NITRATE 1 APPLICATION: 20 CREAM TOPICAL at 20:34

## 2022-11-21 RX ADMIN — OXYCODONE HYDROCHLORIDE AND ACETAMINOPHEN 1 TABLET: 7.5; 325 TABLET ORAL at 17:22

## 2022-11-21 RX ADMIN — CARVEDILOL 25 MG: 25 TABLET, FILM COATED ORAL at 17:22

## 2022-11-21 RX ADMIN — ROPINIROLE 2 MG: 2 TABLET, FILM COATED ORAL at 22:19

## 2022-11-21 RX ADMIN — LACTULOSE 10 G: 10 SOLUTION ORAL at 20:33

## 2022-11-21 RX ADMIN — LEVOTHYROXINE SODIUM 275 MCG: 0.17 TABLET ORAL at 06:30

## 2022-11-21 RX ADMIN — OXYCODONE HYDROCHLORIDE AND ACETAMINOPHEN 1 TABLET: 7.5; 325 TABLET ORAL at 22:19

## 2022-11-21 RX ADMIN — APIXABAN 2.5 MG: 2.5 TABLET, FILM COATED ORAL at 20:33

## 2022-11-21 RX ADMIN — Medication 3 MG: at 22:19

## 2022-11-21 RX ADMIN — POLYETHYLENE GLYCOL 3350 17 G: 17 POWDER, FOR SOLUTION ORAL at 12:59

## 2022-11-21 RX ADMIN — LACTULOSE 10 G: 10 SOLUTION ORAL at 12:56

## 2022-11-22 LAB
ALBUMIN SERPL-MCNC: 2.8 G/DL (ref 3.5–5.2)
ANION GAP SERPL CALCULATED.3IONS-SCNC: 8 MMOL/L (ref 5–15)
BUN SERPL-MCNC: 27 MG/DL (ref 6–20)
BUN/CREAT SERPL: 10.4 (ref 7–25)
CALCIUM SPEC-SCNC: 8.2 MG/DL (ref 8.6–10.5)
CHLORIDE SERPL-SCNC: 99 MMOL/L (ref 98–107)
CO2 SERPL-SCNC: 25 MMOL/L (ref 22–29)
CREAT SERPL-MCNC: 2.6 MG/DL (ref 0.57–1)
DEPRECATED RDW RBC AUTO: 52.3 FL (ref 37–54)
EGFRCR SERPLBLD CKD-EPI 2021: 20.9 ML/MIN/1.73
ERYTHROCYTE [DISTWIDTH] IN BLOOD BY AUTOMATED COUNT: 16.1 % (ref 12.3–15.4)
GLUCOSE BLDC GLUCOMTR-MCNC: 135 MG/DL (ref 70–130)
GLUCOSE BLDC GLUCOMTR-MCNC: 192 MG/DL (ref 70–130)
GLUCOSE BLDC GLUCOMTR-MCNC: 197 MG/DL (ref 70–130)
GLUCOSE BLDC GLUCOMTR-MCNC: 206 MG/DL (ref 70–130)
GLUCOSE SERPL-MCNC: 129 MG/DL (ref 65–99)
HCT VFR BLD AUTO: 24.6 % (ref 34–46.6)
HGB BLD-MCNC: 7.7 G/DL (ref 12–15.9)
MAGNESIUM SERPL-MCNC: 1.9 MG/DL (ref 1.6–2.6)
MCH RBC QN AUTO: 27.7 PG (ref 26.6–33)
MCHC RBC AUTO-ENTMCNC: 31.3 G/DL (ref 31.5–35.7)
MCV RBC AUTO: 88.5 FL (ref 79–97)
PHOSPHATE SERPL-MCNC: 3.4 MG/DL (ref 2.5–4.5)
PLATELET # BLD AUTO: 194 10*3/MM3 (ref 140–450)
PMV BLD AUTO: 10.2 FL (ref 6–12)
POTASSIUM SERPL-SCNC: 5.3 MMOL/L (ref 3.5–5.2)
RBC # BLD AUTO: 2.78 10*6/MM3 (ref 3.77–5.28)
SODIUM SERPL-SCNC: 132 MMOL/L (ref 136–145)
WBC NRBC COR # BLD: 6.49 10*3/MM3 (ref 3.4–10.8)

## 2022-11-22 PROCEDURE — 63710000001 INSULIN LISPRO (HUMAN) PER 5 UNITS: Performed by: HOSPITALIST

## 2022-11-22 PROCEDURE — 82962 GLUCOSE BLOOD TEST: CPT

## 2022-11-22 PROCEDURE — 85027 COMPLETE CBC AUTOMATED: CPT | Performed by: STUDENT IN AN ORGANIZED HEALTH CARE EDUCATION/TRAINING PROGRAM

## 2022-11-22 PROCEDURE — 25010000002 ONDANSETRON PER 1 MG: Performed by: ORTHOPAEDIC SURGERY

## 2022-11-22 PROCEDURE — 83735 ASSAY OF MAGNESIUM: CPT | Performed by: INTERNAL MEDICINE

## 2022-11-22 PROCEDURE — 80069 RENAL FUNCTION PANEL: CPT | Performed by: INTERNAL MEDICINE

## 2022-11-22 RX ORDER — OXYCODONE AND ACETAMINOPHEN 7.5; 325 MG/1; MG/1
1 TABLET ORAL EVERY 4 HOURS PRN
Status: DISCONTINUED | OUTPATIENT
Start: 2022-11-22 | End: 2022-11-24 | Stop reason: HOSPADM

## 2022-11-22 RX ADMIN — AMLODIPINE BESYLATE 5 MG: 5 TABLET ORAL at 08:58

## 2022-11-22 RX ADMIN — APIXABAN 2.5 MG: 2.5 TABLET, FILM COATED ORAL at 21:50

## 2022-11-22 RX ADMIN — INSULIN LISPRO 2 UNITS: 100 INJECTION, SOLUTION INTRAVENOUS; SUBCUTANEOUS at 13:02

## 2022-11-22 RX ADMIN — MICONAZOLE NITRATE 1 APPLICATION: 20 CREAM TOPICAL at 21:51

## 2022-11-22 RX ADMIN — SERTRALINE 150 MG: 100 TABLET, FILM COATED ORAL at 08:58

## 2022-11-22 RX ADMIN — OXYCODONE HYDROCHLORIDE AND ACETAMINOPHEN 1 TABLET: 7.5; 325 TABLET ORAL at 23:50

## 2022-11-22 RX ADMIN — CARVEDILOL 25 MG: 25 TABLET, FILM COATED ORAL at 17:04

## 2022-11-22 RX ADMIN — CARVEDILOL 25 MG: 25 TABLET, FILM COATED ORAL at 08:58

## 2022-11-22 RX ADMIN — OXYCODONE HYDROCHLORIDE AND ACETAMINOPHEN 1 TABLET: 7.5; 325 TABLET ORAL at 05:57

## 2022-11-22 RX ADMIN — FOLIC ACID 1 MG: 1 TABLET ORAL at 08:58

## 2022-11-22 RX ADMIN — OXYCODONE HYDROCHLORIDE AND ACETAMINOPHEN 1 TABLET: 7.5; 325 TABLET ORAL at 19:40

## 2022-11-22 RX ADMIN — PANTOPRAZOLE SODIUM 40 MG: 40 TABLET, DELAYED RELEASE ORAL at 08:58

## 2022-11-22 RX ADMIN — APIXABAN 2.5 MG: 2.5 TABLET, FILM COATED ORAL at 08:58

## 2022-11-22 RX ADMIN — Medication 3 MG: at 21:31

## 2022-11-22 RX ADMIN — ROPINIROLE 2 MG: 2 TABLET, FILM COATED ORAL at 21:31

## 2022-11-22 RX ADMIN — LEVOTHYROXINE SODIUM 275 MCG: 0.17 TABLET ORAL at 05:57

## 2022-11-22 RX ADMIN — ONDANSETRON 4 MG: 2 INJECTION INTRAMUSCULAR; INTRAVENOUS at 11:41

## 2022-11-22 RX ADMIN — MICONAZOLE NITRATE 1 APPLICATION: 20 CREAM TOPICAL at 08:58

## 2022-11-22 RX ADMIN — LACTULOSE 10 G: 10 SOLUTION ORAL at 08:58

## 2022-11-22 RX ADMIN — OXYCODONE HYDROCHLORIDE AND ACETAMINOPHEN 1 TABLET: 7.5; 325 TABLET ORAL at 14:35

## 2022-11-22 RX ADMIN — INSULIN LISPRO 3 UNITS: 100 INJECTION, SOLUTION INTRAVENOUS; SUBCUTANEOUS at 17:04

## 2022-11-23 LAB
ALBUMIN SERPL-MCNC: 2.6 G/DL (ref 3.5–5.2)
ANION GAP SERPL CALCULATED.3IONS-SCNC: 9 MMOL/L (ref 5–15)
BUN SERPL-MCNC: 34 MG/DL (ref 6–20)
BUN/CREAT SERPL: 11 (ref 7–25)
CALCIUM SPEC-SCNC: 7.7 MG/DL (ref 8.6–10.5)
CHLORIDE SERPL-SCNC: 96 MMOL/L (ref 98–107)
CO2 SERPL-SCNC: 22 MMOL/L (ref 22–29)
CREAT SERPL-MCNC: 3.08 MG/DL (ref 0.57–1)
EGFRCR SERPLBLD CKD-EPI 2021: 17.1 ML/MIN/1.73
GLUCOSE BLDC GLUCOMTR-MCNC: 155 MG/DL (ref 70–130)
GLUCOSE BLDC GLUCOMTR-MCNC: 206 MG/DL (ref 70–130)
GLUCOSE BLDC GLUCOMTR-MCNC: 207 MG/DL (ref 70–130)
GLUCOSE BLDC GLUCOMTR-MCNC: 319 MG/DL (ref 70–130)
GLUCOSE SERPL-MCNC: 296 MG/DL (ref 65–99)
PHOSPHATE SERPL-MCNC: 3.7 MG/DL (ref 2.5–4.5)
POTASSIUM SERPL-SCNC: 5.6 MMOL/L (ref 3.5–5.2)
SODIUM SERPL-SCNC: 127 MMOL/L (ref 136–145)

## 2022-11-23 PROCEDURE — 97530 THERAPEUTIC ACTIVITIES: CPT

## 2022-11-23 PROCEDURE — 97110 THERAPEUTIC EXERCISES: CPT

## 2022-11-23 PROCEDURE — 80069 RENAL FUNCTION PANEL: CPT | Performed by: INTERNAL MEDICINE

## 2022-11-23 PROCEDURE — 63710000001 INSULIN LISPRO (HUMAN) PER 5 UNITS: Performed by: HOSPITALIST

## 2022-11-23 PROCEDURE — 82962 GLUCOSE BLOOD TEST: CPT

## 2022-11-23 RX ADMIN — MICONAZOLE NITRATE 1 APPLICATION: 20 CREAM TOPICAL at 22:32

## 2022-11-23 RX ADMIN — FOLIC ACID 1 MG: 1 TABLET ORAL at 13:23

## 2022-11-23 RX ADMIN — CARVEDILOL 25 MG: 25 TABLET, FILM COATED ORAL at 20:33

## 2022-11-23 RX ADMIN — Medication 3 MG: at 21:41

## 2022-11-23 RX ADMIN — APIXABAN 2.5 MG: 2.5 TABLET, FILM COATED ORAL at 20:34

## 2022-11-23 RX ADMIN — CARVEDILOL 25 MG: 25 TABLET, FILM COATED ORAL at 13:23

## 2022-11-23 RX ADMIN — ROPINIROLE 2 MG: 2 TABLET, FILM COATED ORAL at 20:33

## 2022-11-23 RX ADMIN — AMLODIPINE BESYLATE 5 MG: 5 TABLET ORAL at 13:23

## 2022-11-23 RX ADMIN — OXYCODONE HYDROCHLORIDE AND ACETAMINOPHEN 1 TABLET: 7.5; 325 TABLET ORAL at 13:23

## 2022-11-23 RX ADMIN — INSULIN LISPRO 2 UNITS: 100 INJECTION, SOLUTION INTRAVENOUS; SUBCUTANEOUS at 13:34

## 2022-11-23 RX ADMIN — OXYCODONE HYDROCHLORIDE AND ACETAMINOPHEN 1 TABLET: 7.5; 325 TABLET ORAL at 20:33

## 2022-11-23 RX ADMIN — LEVOTHYROXINE SODIUM 275 MCG: 0.17 TABLET ORAL at 05:55

## 2022-11-23 RX ADMIN — APIXABAN 2.5 MG: 2.5 TABLET, FILM COATED ORAL at 13:23

## 2022-11-23 RX ADMIN — MICONAZOLE NITRATE 1 APPLICATION: 20 CREAM TOPICAL at 13:24

## 2022-11-23 RX ADMIN — SERTRALINE 150 MG: 100 TABLET, FILM COATED ORAL at 13:23

## 2022-11-23 RX ADMIN — INSULIN LISPRO 3 UNITS: 100 INJECTION, SOLUTION INTRAVENOUS; SUBCUTANEOUS at 18:45

## 2022-11-23 RX ADMIN — OXYCODONE HYDROCHLORIDE AND ACETAMINOPHEN 1 TABLET: 7.5; 325 TABLET ORAL at 05:55

## 2022-11-23 RX ADMIN — PANTOPRAZOLE SODIUM 40 MG: 40 TABLET, DELAYED RELEASE ORAL at 13:23

## 2022-11-24 VITALS
OXYGEN SATURATION: 100 % | SYSTOLIC BLOOD PRESSURE: 155 MMHG | WEIGHT: 126.54 LBS | TEMPERATURE: 98.2 F | HEIGHT: 62 IN | RESPIRATION RATE: 16 BRPM | DIASTOLIC BLOOD PRESSURE: 75 MMHG | BODY MASS INDEX: 23.29 KG/M2 | HEART RATE: 65 BPM

## 2022-11-24 PROBLEM — E87.5 HYPERKALEMIA: Status: RESOLVED | Noted: 2022-10-12 | Resolved: 2022-11-24

## 2022-11-24 PROBLEM — D72.829 LEUKOCYTOSIS: Status: RESOLVED | Noted: 2018-03-16 | Resolved: 2022-11-24

## 2022-11-24 LAB
DEPRECATED RDW RBC AUTO: 50 FL (ref 37–54)
ERYTHROCYTE [DISTWIDTH] IN BLOOD BY AUTOMATED COUNT: 15.7 % (ref 12.3–15.4)
GLUCOSE BLDC GLUCOMTR-MCNC: 208 MG/DL (ref 70–130)
GLUCOSE BLDC GLUCOMTR-MCNC: 215 MG/DL (ref 70–130)
HCT VFR BLD AUTO: 24.6 % (ref 34–46.6)
HGB BLD-MCNC: 7.8 G/DL (ref 12–15.9)
MCH RBC QN AUTO: 27.9 PG (ref 26.6–33)
MCHC RBC AUTO-ENTMCNC: 31.7 G/DL (ref 31.5–35.7)
MCV RBC AUTO: 87.9 FL (ref 79–97)
PLATELET # BLD AUTO: 203 10*3/MM3 (ref 140–450)
PMV BLD AUTO: 10.2 FL (ref 6–12)
RBC # BLD AUTO: 2.8 10*6/MM3 (ref 3.77–5.28)
WBC NRBC COR # BLD: 8.93 10*3/MM3 (ref 3.4–10.8)

## 2022-11-24 PROCEDURE — 25010000002 ONDANSETRON PER 1 MG: Performed by: ORTHOPAEDIC SURGERY

## 2022-11-24 PROCEDURE — 82962 GLUCOSE BLOOD TEST: CPT

## 2022-11-24 PROCEDURE — 63710000001 INSULIN LISPRO (HUMAN) PER 5 UNITS: Performed by: HOSPITALIST

## 2022-11-24 PROCEDURE — 85027 COMPLETE CBC AUTOMATED: CPT | Performed by: STUDENT IN AN ORGANIZED HEALTH CARE EDUCATION/TRAINING PROGRAM

## 2022-11-24 RX ORDER — AMLODIPINE BESYLATE 5 MG/1
5 TABLET ORAL
Status: ON HOLD
Start: 2022-11-25 | End: 2022-12-09 | Stop reason: SDUPTHER

## 2022-11-24 RX ORDER — AMOXICILLIN 250 MG
2 CAPSULE ORAL NIGHTLY
Status: ON HOLD
Start: 2022-11-24 | End: 2023-02-11 | Stop reason: SDUPTHER

## 2022-11-24 RX ORDER — ROPINIROLE 1 MG/1
2 TABLET, FILM COATED ORAL NIGHTLY
Qty: 60 TABLET | Refills: 0
Start: 2022-11-24 | End: 2023-01-29 | Stop reason: HOSPADM

## 2022-11-24 RX ORDER — OXYCODONE AND ACETAMINOPHEN 7.5; 325 MG/1; MG/1
1 TABLET ORAL EVERY 4 HOURS PRN
Qty: 18 TABLET | Refills: 0 | Status: SHIPPED | OUTPATIENT
Start: 2022-11-24 | End: 2023-01-29 | Stop reason: HOSPADM

## 2022-11-24 RX ORDER — LEVOTHYROXINE SODIUM 0.03 MG/1
275 TABLET ORAL
Start: 2022-11-25 | End: 2022-12-09 | Stop reason: HOSPADM

## 2022-11-24 RX ORDER — LACTULOSE 10 G/15ML
10 SOLUTION ORAL 2 TIMES DAILY
Status: ON HOLD
Start: 2022-11-24 | End: 2023-03-04

## 2022-11-24 RX ORDER — INSULIN LISPRO 100 [IU]/ML
0-7 INJECTION, SOLUTION INTRAVENOUS; SUBCUTANEOUS
Status: ON HOLD
Start: 2022-11-24 | End: 2023-03-21

## 2022-11-24 RX ADMIN — LEVOTHYROXINE SODIUM 275 MCG: 0.17 TABLET ORAL at 05:24

## 2022-11-24 RX ADMIN — SERTRALINE 150 MG: 100 TABLET, FILM COATED ORAL at 09:47

## 2022-11-24 RX ADMIN — ONDANSETRON 4 MG: 2 INJECTION INTRAMUSCULAR; INTRAVENOUS at 10:17

## 2022-11-24 RX ADMIN — OXYCODONE HYDROCHLORIDE AND ACETAMINOPHEN 1 TABLET: 7.5; 325 TABLET ORAL at 13:25

## 2022-11-24 RX ADMIN — FOLIC ACID 1 MG: 1 TABLET ORAL at 09:47

## 2022-11-24 RX ADMIN — MICONAZOLE NITRATE 1 APPLICATION: 20 CREAM TOPICAL at 09:48

## 2022-11-24 RX ADMIN — OXYCODONE HYDROCHLORIDE AND ACETAMINOPHEN 1 TABLET: 7.5; 325 TABLET ORAL at 09:47

## 2022-11-24 RX ADMIN — OXYCODONE HYDROCHLORIDE AND ACETAMINOPHEN 1 TABLET: 7.5; 325 TABLET ORAL at 04:46

## 2022-11-24 RX ADMIN — AMLODIPINE BESYLATE 5 MG: 5 TABLET ORAL at 09:47

## 2022-11-24 RX ADMIN — OXYCODONE HYDROCHLORIDE AND ACETAMINOPHEN 1 TABLET: 7.5; 325 TABLET ORAL at 00:19

## 2022-11-24 RX ADMIN — APIXABAN 2.5 MG: 2.5 TABLET, FILM COATED ORAL at 09:47

## 2022-11-24 RX ADMIN — INSULIN LISPRO 3 UNITS: 100 INJECTION, SOLUTION INTRAVENOUS; SUBCUTANEOUS at 09:47

## 2022-11-24 RX ADMIN — INSULIN LISPRO 3 UNITS: 100 INJECTION, SOLUTION INTRAVENOUS; SUBCUTANEOUS at 12:31

## 2022-11-24 RX ADMIN — CARVEDILOL 25 MG: 25 TABLET, FILM COATED ORAL at 09:47

## 2022-11-24 RX ADMIN — PANTOPRAZOLE SODIUM 40 MG: 40 TABLET, DELAYED RELEASE ORAL at 09:47

## 2022-12-01 ENCOUNTER — HOSPITAL ENCOUNTER (OUTPATIENT)
Facility: HOSPITAL | Age: 57
Setting detail: HOSPITAL OUTPATIENT SURGERY
End: 2022-12-01
Attending: UROLOGY | Admitting: UROLOGY

## 2022-12-04 ENCOUNTER — HOSPITAL ENCOUNTER (INPATIENT)
Facility: HOSPITAL | Age: 57
LOS: 3 days | Discharge: HOME HEALTH | End: 2022-12-09
Attending: EMERGENCY MEDICINE | Admitting: INTERNAL MEDICINE

## 2022-12-04 ENCOUNTER — APPOINTMENT (OUTPATIENT)
Dept: GENERAL RADIOLOGY | Facility: HOSPITAL | Age: 57
End: 2022-12-04

## 2022-12-04 ENCOUNTER — APPOINTMENT (OUTPATIENT)
Dept: CT IMAGING | Facility: HOSPITAL | Age: 57
End: 2022-12-04

## 2022-12-04 DIAGNOSIS — R56.9 SEIZURE: ICD-10-CM

## 2022-12-04 DIAGNOSIS — R93.89 ABNORMAL CHEST X-RAY: ICD-10-CM

## 2022-12-04 DIAGNOSIS — E87.1 HYPONATREMIA: ICD-10-CM

## 2022-12-04 DIAGNOSIS — E03.9 ACQUIRED HYPOTHYROIDISM: ICD-10-CM

## 2022-12-04 DIAGNOSIS — E87.5 HYPERKALEMIA: ICD-10-CM

## 2022-12-04 DIAGNOSIS — S72.144A CLOSED NONDISPLACED INTERTROCHANTERIC FRACTURE OF RIGHT FEMUR, INITIAL ENCOUNTER: Primary | ICD-10-CM

## 2022-12-04 PROBLEM — Z86.79 HISTORY OF INTRACRANIAL HEMORRHAGE: Chronic | Status: ACTIVE | Noted: 2022-05-01

## 2022-12-04 LAB
ABO GROUP BLD: NORMAL
ALBUMIN SERPL-MCNC: 3.5 G/DL (ref 3.5–5.2)
ALBUMIN/GLOB SERPL: 1.1 G/DL
ALP SERPL-CCNC: 211 U/L (ref 39–117)
ALT SERPL W P-5'-P-CCNC: <5 U/L (ref 1–33)
ANION GAP SERPL CALCULATED.3IONS-SCNC: 11.8 MMOL/L (ref 5–15)
AST SERPL-CCNC: 15 U/L (ref 1–32)
BASOPHILS # BLD AUTO: 0.04 10*3/MM3 (ref 0–0.2)
BASOPHILS NFR BLD AUTO: 0.4 % (ref 0–1.5)
BILIRUB SERPL-MCNC: 0.3 MG/DL (ref 0–1.2)
BLD GP AB SCN SERPL QL: NEGATIVE
BUN SERPL-MCNC: 36 MG/DL (ref 6–20)
BUN/CREAT SERPL: 11.7 (ref 7–25)
CALCIUM SPEC-SCNC: 8.4 MG/DL (ref 8.6–10.5)
CHLORIDE SERPL-SCNC: 89 MMOL/L (ref 98–107)
CO2 SERPL-SCNC: 28.2 MMOL/L (ref 22–29)
CREAT SERPL-MCNC: 3.08 MG/DL (ref 0.57–1)
DEPRECATED RDW RBC AUTO: 51 FL (ref 37–54)
EGFRCR SERPLBLD CKD-EPI 2021: 17.1 ML/MIN/1.73
EOSINOPHIL # BLD AUTO: 0.11 10*3/MM3 (ref 0–0.4)
EOSINOPHIL NFR BLD AUTO: 1.1 % (ref 0.3–6.2)
ERYTHROCYTE [DISTWIDTH] IN BLOOD BY AUTOMATED COUNT: 16.1 % (ref 12.3–15.4)
GLOBULIN UR ELPH-MCNC: 3.1 GM/DL
GLUCOSE SERPL-MCNC: 308 MG/DL (ref 65–99)
HCT VFR BLD AUTO: 25.7 % (ref 34–46.6)
HGB BLD-MCNC: 8.2 G/DL (ref 12–15.9)
HOLD SPECIMEN: NORMAL
HOLD SPECIMEN: NORMAL
IMM GRANULOCYTES # BLD AUTO: 0.07 10*3/MM3 (ref 0–0.05)
IMM GRANULOCYTES NFR BLD AUTO: 0.7 % (ref 0–0.5)
INR PPP: 1.27 (ref 0.9–1.1)
LYMPHOCYTES # BLD AUTO: 1.18 10*3/MM3 (ref 0.7–3.1)
LYMPHOCYTES NFR BLD AUTO: 12 % (ref 19.6–45.3)
MAGNESIUM SERPL-MCNC: 1.9 MG/DL (ref 1.6–2.6)
MCH RBC QN AUTO: 27.6 PG (ref 26.6–33)
MCHC RBC AUTO-ENTMCNC: 31.9 G/DL (ref 31.5–35.7)
MCV RBC AUTO: 86.5 FL (ref 79–97)
MONOCYTES # BLD AUTO: 0.57 10*3/MM3 (ref 0.1–0.9)
MONOCYTES NFR BLD AUTO: 5.8 % (ref 5–12)
NEUTROPHILS NFR BLD AUTO: 7.84 10*3/MM3 (ref 1.7–7)
NEUTROPHILS NFR BLD AUTO: 80 % (ref 42.7–76)
NRBC BLD AUTO-RTO: 0 /100 WBC (ref 0–0.2)
PLATELET # BLD AUTO: 180 10*3/MM3 (ref 140–450)
PMV BLD AUTO: 10.8 FL (ref 6–12)
POTASSIUM SERPL-SCNC: 5.7 MMOL/L (ref 3.5–5.2)
PROCALCITONIN SERPL-MCNC: 0.12 NG/ML (ref 0–0.25)
PROT SERPL-MCNC: 6.6 G/DL (ref 6–8.5)
PROTHROMBIN TIME: 16.1 SECONDS (ref 11.7–14.2)
RBC # BLD AUTO: 2.97 10*6/MM3 (ref 3.77–5.28)
RH BLD: POSITIVE
SODIUM SERPL-SCNC: 129 MMOL/L (ref 136–145)
T&S EXPIRATION DATE: NORMAL
TROPONIN T SERPL-MCNC: 0.15 NG/ML (ref 0–0.03)
TSH SERPL DL<=0.05 MIU/L-ACNC: 57.7 UIU/ML (ref 0.27–4.2)
WBC NRBC COR # BLD: 9.81 10*3/MM3 (ref 3.4–10.8)
WHOLE BLOOD HOLD COAG: NORMAL
WHOLE BLOOD HOLD SPECIMEN: NORMAL

## 2022-12-04 PROCEDURE — 70450 CT HEAD/BRAIN W/O DYE: CPT

## 2022-12-04 PROCEDURE — 83735 ASSAY OF MAGNESIUM: CPT | Performed by: EMERGENCY MEDICINE

## 2022-12-04 PROCEDURE — G0378 HOSPITAL OBSERVATION PER HR: HCPCS

## 2022-12-04 PROCEDURE — 84145 PROCALCITONIN (PCT): CPT | Performed by: EMERGENCY MEDICINE

## 2022-12-04 PROCEDURE — 86850 RBC ANTIBODY SCREEN: CPT | Performed by: EMERGENCY MEDICINE

## 2022-12-04 PROCEDURE — 80053 COMPREHEN METABOLIC PANEL: CPT | Performed by: EMERGENCY MEDICINE

## 2022-12-04 PROCEDURE — 85025 COMPLETE CBC W/AUTO DIFF WBC: CPT | Performed by: EMERGENCY MEDICINE

## 2022-12-04 PROCEDURE — 86900 BLOOD TYPING SEROLOGIC ABO: CPT | Performed by: EMERGENCY MEDICINE

## 2022-12-04 PROCEDURE — 86901 BLOOD TYPING SEROLOGIC RH(D): CPT | Performed by: EMERGENCY MEDICINE

## 2022-12-04 PROCEDURE — 85610 PROTHROMBIN TIME: CPT | Performed by: EMERGENCY MEDICINE

## 2022-12-04 PROCEDURE — 71045 X-RAY EXAM CHEST 1 VIEW: CPT

## 2022-12-04 PROCEDURE — 93005 ELECTROCARDIOGRAM TRACING: CPT | Performed by: EMERGENCY MEDICINE

## 2022-12-04 PROCEDURE — 84484 ASSAY OF TROPONIN QUANT: CPT | Performed by: EMERGENCY MEDICINE

## 2022-12-04 PROCEDURE — 93010 ELECTROCARDIOGRAM REPORT: CPT | Performed by: INTERNAL MEDICINE

## 2022-12-04 PROCEDURE — 99285 EMERGENCY DEPT VISIT HI MDM: CPT

## 2022-12-04 PROCEDURE — 25010000002 LEVETRIRACETAM PER 10 MG: Performed by: EMERGENCY MEDICINE

## 2022-12-04 PROCEDURE — 84443 ASSAY THYROID STIM HORMONE: CPT | Performed by: EMERGENCY MEDICINE

## 2022-12-04 PROCEDURE — 25010000002 CALCIUM GLUCONATE-NACL 1-0.675 GM/50ML-% SOLUTION: Performed by: EMERGENCY MEDICINE

## 2022-12-04 PROCEDURE — 25010000002 LORAZEPAM PER 2 MG: Performed by: EMERGENCY MEDICINE

## 2022-12-04 RX ORDER — INSULIN LISPRO 100 [IU]/ML
0-14 INJECTION, SOLUTION INTRAVENOUS; SUBCUTANEOUS
Status: DISCONTINUED | OUTPATIENT
Start: 2022-12-05 | End: 2022-12-09 | Stop reason: HOSPADM

## 2022-12-04 RX ORDER — DEXTROSE MONOHYDRATE 25 G/50ML
50 INJECTION, SOLUTION INTRAVENOUS ONCE
Status: COMPLETED | OUTPATIENT
Start: 2022-12-04 | End: 2022-12-05

## 2022-12-04 RX ORDER — CALCIUM GLUCONATE 20 MG/ML
1 INJECTION, SOLUTION INTRAVENOUS ONCE
Status: COMPLETED | OUTPATIENT
Start: 2022-12-04 | End: 2022-12-05

## 2022-12-04 RX ORDER — LORAZEPAM 2 MG/ML
0.5 INJECTION INTRAMUSCULAR AS NEEDED
Status: DISCONTINUED | OUTPATIENT
Start: 2022-12-04 | End: 2022-12-09 | Stop reason: HOSPADM

## 2022-12-04 RX ORDER — SODIUM CHLORIDE 0.9 % (FLUSH) 0.9 %
10 SYRINGE (ML) INJECTION AS NEEDED
Status: DISCONTINUED | OUTPATIENT
Start: 2022-12-04 | End: 2022-12-09 | Stop reason: HOSPADM

## 2022-12-04 RX ORDER — LORAZEPAM 2 MG/ML
0.5 INJECTION INTRAMUSCULAR ONCE
Status: COMPLETED | OUTPATIENT
Start: 2022-12-04 | End: 2022-12-04

## 2022-12-04 RX ORDER — LEVETIRACETAM 500 MG/5ML
1000 INJECTION, SOLUTION, CONCENTRATE INTRAVENOUS ONCE
Status: COMPLETED | OUTPATIENT
Start: 2022-12-04 | End: 2022-12-04

## 2022-12-04 RX ORDER — DEXTROSE MONOHYDRATE 25 G/50ML
25 INJECTION, SOLUTION INTRAVENOUS
Status: DISCONTINUED | OUTPATIENT
Start: 2022-12-04 | End: 2022-12-09 | Stop reason: HOSPADM

## 2022-12-04 RX ORDER — SODIUM CHLORIDE 9 MG/ML
40 INJECTION, SOLUTION INTRAVENOUS AS NEEDED
Status: DISCONTINUED | OUTPATIENT
Start: 2022-12-04 | End: 2022-12-09 | Stop reason: HOSPADM

## 2022-12-04 RX ORDER — NICOTINE POLACRILEX 4 MG
15 LOZENGE BUCCAL
Status: DISCONTINUED | OUTPATIENT
Start: 2022-12-04 | End: 2022-12-09 | Stop reason: HOSPADM

## 2022-12-04 RX ORDER — NITROGLYCERIN 0.4 MG/1
0.4 TABLET SUBLINGUAL
Status: DISCONTINUED | OUTPATIENT
Start: 2022-12-04 | End: 2022-12-09 | Stop reason: HOSPADM

## 2022-12-04 RX ORDER — SODIUM CHLORIDE 0.9 % (FLUSH) 0.9 %
10 SYRINGE (ML) INJECTION EVERY 12 HOURS SCHEDULED
Status: DISCONTINUED | OUTPATIENT
Start: 2022-12-04 | End: 2022-12-09 | Stop reason: HOSPADM

## 2022-12-04 RX ADMIN — LORAZEPAM 0.5 MG: 2 INJECTION INTRAMUSCULAR; INTRAVENOUS at 20:19

## 2022-12-04 RX ADMIN — LEVETIRACETAM 1000 MG: 100 INJECTION INTRAVENOUS at 20:19

## 2022-12-04 RX ADMIN — CALCIUM GLUCONATE 1 G: 20 INJECTION, SOLUTION INTRAVENOUS at 23:18

## 2022-12-05 ENCOUNTER — APPOINTMENT (OUTPATIENT)
Dept: NEUROLOGY | Facility: HOSPITAL | Age: 57
End: 2022-12-05

## 2022-12-05 LAB
ANION GAP SERPL CALCULATED.3IONS-SCNC: 9 MMOL/L (ref 5–15)
BASOPHILS # BLD AUTO: 0.02 10*3/MM3 (ref 0–0.2)
BASOPHILS NFR BLD AUTO: 0.3 % (ref 0–1.5)
BUN SERPL-MCNC: 44 MG/DL (ref 6–20)
BUN/CREAT SERPL: 14.9 (ref 7–25)
CALCIUM SPEC-SCNC: 7.9 MG/DL (ref 8.6–10.5)
CHLORIDE SERPL-SCNC: 94 MMOL/L (ref 98–107)
CO2 SERPL-SCNC: 29 MMOL/L (ref 22–29)
CREAT SERPL-MCNC: 2.96 MG/DL (ref 0.57–1)
DEPRECATED RDW RBC AUTO: 55.9 FL (ref 37–54)
EGFRCR SERPLBLD CKD-EPI 2021: 17.9 ML/MIN/1.73
EOSINOPHIL # BLD AUTO: 0.1 10*3/MM3 (ref 0–0.4)
EOSINOPHIL NFR BLD AUTO: 1.3 % (ref 0.3–6.2)
ERYTHROCYTE [DISTWIDTH] IN BLOOD BY AUTOMATED COUNT: 16.7 % (ref 12.3–15.4)
GLUCOSE BLDC GLUCOMTR-MCNC: 123 MG/DL (ref 70–130)
GLUCOSE BLDC GLUCOMTR-MCNC: 183 MG/DL (ref 70–130)
GLUCOSE BLDC GLUCOMTR-MCNC: 187 MG/DL (ref 70–130)
GLUCOSE BLDC GLUCOMTR-MCNC: 194 MG/DL (ref 70–130)
GLUCOSE SERPL-MCNC: 168 MG/DL (ref 65–99)
HCT VFR BLD AUTO: 23.7 % (ref 34–46.6)
HGB BLD-MCNC: 7.3 G/DL (ref 12–15.9)
IMM GRANULOCYTES # BLD AUTO: 0.05 10*3/MM3 (ref 0–0.05)
IMM GRANULOCYTES NFR BLD AUTO: 0.6 % (ref 0–0.5)
LYMPHOCYTES # BLD AUTO: 1.76 10*3/MM3 (ref 0.7–3.1)
LYMPHOCYTES NFR BLD AUTO: 22.7 % (ref 19.6–45.3)
MCH RBC QN AUTO: 28.6 PG (ref 26.6–33)
MCHC RBC AUTO-ENTMCNC: 30.8 G/DL (ref 31.5–35.7)
MCV RBC AUTO: 92.9 FL (ref 79–97)
MONOCYTES # BLD AUTO: 0.52 10*3/MM3 (ref 0.1–0.9)
MONOCYTES NFR BLD AUTO: 6.7 % (ref 5–12)
NEUTROPHILS NFR BLD AUTO: 5.3 10*3/MM3 (ref 1.7–7)
NEUTROPHILS NFR BLD AUTO: 68.4 % (ref 42.7–76)
NRBC BLD AUTO-RTO: 0 /100 WBC (ref 0–0.2)
PLATELET # BLD AUTO: 145 10*3/MM3 (ref 140–450)
PMV BLD AUTO: 10 FL (ref 6–12)
POTASSIUM SERPL-SCNC: 5.1 MMOL/L (ref 3.5–5.2)
QT INTERVAL: 490 MS
RBC # BLD AUTO: 2.55 10*6/MM3 (ref 3.77–5.28)
SODIUM SERPL-SCNC: 132 MMOL/L (ref 136–145)
WBC NRBC COR # BLD: 7.75 10*3/MM3 (ref 3.4–10.8)

## 2022-12-05 PROCEDURE — 85025 COMPLETE CBC W/AUTO DIFF WBC: CPT | Performed by: NURSE PRACTITIONER

## 2022-12-05 PROCEDURE — 82962 GLUCOSE BLOOD TEST: CPT

## 2022-12-05 PROCEDURE — G0378 HOSPITAL OBSERVATION PER HR: HCPCS

## 2022-12-05 PROCEDURE — 95816 EEG AWAKE AND DROWSY: CPT

## 2022-12-05 PROCEDURE — 25010000002 LEVETRIRACETAM PER 10 MG: Performed by: NURSE PRACTITIONER

## 2022-12-05 PROCEDURE — 80048 BASIC METABOLIC PNL TOTAL CA: CPT | Performed by: EMERGENCY MEDICINE

## 2022-12-05 PROCEDURE — 95816 EEG AWAKE AND DROWSY: CPT | Performed by: PSYCHIATRY & NEUROLOGY

## 2022-12-05 PROCEDURE — 83036 HEMOGLOBIN GLYCOSYLATED A1C: CPT | Performed by: PSYCHIATRY & NEUROLOGY

## 2022-12-05 PROCEDURE — 25010000002 FUROSEMIDE PER 20 MG: Performed by: EMERGENCY MEDICINE

## 2022-12-05 PROCEDURE — 99222 1ST HOSP IP/OBS MODERATE 55: CPT | Performed by: PSYCHIATRY & NEUROLOGY

## 2022-12-05 PROCEDURE — 63710000001 INSULIN LISPRO (HUMAN) PER 5 UNITS: Performed by: NURSE PRACTITIONER

## 2022-12-05 PROCEDURE — 63710000001 INSULIN REGULAR HUMAN PER 5 UNITS: Performed by: EMERGENCY MEDICINE

## 2022-12-05 RX ORDER — FUROSEMIDE 10 MG/ML
80 INJECTION INTRAMUSCULAR; INTRAVENOUS ONCE
Status: COMPLETED | OUTPATIENT
Start: 2022-12-05 | End: 2022-12-05

## 2022-12-05 RX ORDER — FOLIC ACID 1 MG/1
1 TABLET ORAL DAILY
Status: DISCONTINUED | OUTPATIENT
Start: 2022-12-05 | End: 2022-12-09 | Stop reason: HOSPADM

## 2022-12-05 RX ORDER — CARVEDILOL 25 MG/1
25 TABLET ORAL 2 TIMES DAILY WITH MEALS
Status: DISCONTINUED | OUTPATIENT
Start: 2022-12-05 | End: 2022-12-09 | Stop reason: HOSPADM

## 2022-12-05 RX ORDER — POLYETHYLENE GLYCOL 3350 17 G/17G
17 POWDER, FOR SOLUTION ORAL DAILY PRN
Status: DISCONTINUED | OUTPATIENT
Start: 2022-12-05 | End: 2022-12-09 | Stop reason: HOSPADM

## 2022-12-05 RX ORDER — AMLODIPINE BESYLATE 5 MG/1
5 TABLET ORAL
Status: DISCONTINUED | OUTPATIENT
Start: 2022-12-05 | End: 2022-12-07

## 2022-12-05 RX ORDER — LEVETIRACETAM 500 MG/5ML
500 INJECTION, SOLUTION, CONCENTRATE INTRAVENOUS EVERY 12 HOURS SCHEDULED
Status: DISCONTINUED | OUTPATIENT
Start: 2022-12-05 | End: 2022-12-05

## 2022-12-05 RX ORDER — OXYCODONE AND ACETAMINOPHEN 7.5; 325 MG/1; MG/1
1 TABLET ORAL EVERY 4 HOURS PRN
Status: DISCONTINUED | OUTPATIENT
Start: 2022-12-05 | End: 2022-12-09 | Stop reason: HOSPADM

## 2022-12-05 RX ORDER — LACTULOSE 10 G/15ML
10 SOLUTION ORAL 2 TIMES DAILY
Status: DISCONTINUED | OUTPATIENT
Start: 2022-12-05 | End: 2022-12-09 | Stop reason: HOSPADM

## 2022-12-05 RX ORDER — ROPINIROLE 2 MG/1
2 TABLET, FILM COATED ORAL NIGHTLY
Status: DISCONTINUED | OUTPATIENT
Start: 2022-12-05 | End: 2022-12-09 | Stop reason: HOSPADM

## 2022-12-05 RX ORDER — PANTOPRAZOLE SODIUM 40 MG/1
40 TABLET, DELAYED RELEASE ORAL DAILY
Status: DISCONTINUED | OUTPATIENT
Start: 2022-12-05 | End: 2022-12-09 | Stop reason: HOSPADM

## 2022-12-05 RX ORDER — AMOXICILLIN 250 MG
2 CAPSULE ORAL NIGHTLY
Status: DISCONTINUED | OUTPATIENT
Start: 2022-12-05 | End: 2022-12-09 | Stop reason: HOSPADM

## 2022-12-05 RX ORDER — UREA 10 %
3 LOTION (ML) TOPICAL NIGHTLY
Status: DISCONTINUED | OUTPATIENT
Start: 2022-12-05 | End: 2022-12-09 | Stop reason: HOSPADM

## 2022-12-05 RX ADMIN — SODIUM BICARBONATE 50 MEQ: 84 INJECTION, SOLUTION INTRAVENOUS at 00:35

## 2022-12-05 RX ADMIN — Medication 3 MG: at 20:28

## 2022-12-05 RX ADMIN — INSULIN LISPRO 3 UNITS: 100 INJECTION, SOLUTION INTRAVENOUS; SUBCUTANEOUS at 12:23

## 2022-12-05 RX ADMIN — FUROSEMIDE 80 MG: 10 INJECTION, SOLUTION INTRAMUSCULAR; INTRAVENOUS at 00:39

## 2022-12-05 RX ADMIN — PANTOPRAZOLE SODIUM 40 MG: 40 TABLET, DELAYED RELEASE ORAL at 10:15

## 2022-12-05 RX ADMIN — SERTRALINE 150 MG: 100 TABLET, FILM COATED ORAL at 12:23

## 2022-12-05 RX ADMIN — OXYCODONE HYDROCHLORIDE AND ACETAMINOPHEN 1 TABLET: 7.5; 325 TABLET ORAL at 10:15

## 2022-12-05 RX ADMIN — INSULIN LISPRO 3 UNITS: 100 INJECTION, SOLUTION INTRAVENOUS; SUBCUTANEOUS at 10:35

## 2022-12-05 RX ADMIN — DEXTROSE MONOHYDRATE 50 ML: 25 INJECTION, SOLUTION INTRAVENOUS at 00:23

## 2022-12-05 RX ADMIN — CARVEDILOL 25 MG: 25 TABLET, FILM COATED ORAL at 12:24

## 2022-12-05 RX ADMIN — Medication 10 ML: at 00:42

## 2022-12-05 RX ADMIN — CARVEDILOL 25 MG: 25 TABLET, FILM COATED ORAL at 17:55

## 2022-12-05 RX ADMIN — FOLIC ACID 1 MG: 1 TABLET ORAL at 12:24

## 2022-12-05 RX ADMIN — INSULIN HUMAN 6 UNITS: 100 INJECTION, SOLUTION PARENTERAL at 00:27

## 2022-12-05 RX ADMIN — LACTULOSE 10 G: 10 SOLUTION ORAL at 20:30

## 2022-12-05 RX ADMIN — OXYCODONE HYDROCHLORIDE AND ACETAMINOPHEN 1 TABLET: 7.5; 325 TABLET ORAL at 20:27

## 2022-12-05 RX ADMIN — AMLODIPINE BESYLATE 5 MG: 5 TABLET ORAL at 12:23

## 2022-12-05 RX ADMIN — LEVOTHYROXINE SODIUM 275 MCG: 0.17 TABLET ORAL at 12:24

## 2022-12-05 RX ADMIN — Medication 10 ML: at 20:29

## 2022-12-05 RX ADMIN — Medication 10 ML: at 10:15

## 2022-12-05 RX ADMIN — OXYCODONE HYDROCHLORIDE AND ACETAMINOPHEN 1 TABLET: 7.5; 325 TABLET ORAL at 16:13

## 2022-12-05 RX ADMIN — ROPINIROLE 2 MG: 2 TABLET, FILM COATED ORAL at 20:28

## 2022-12-05 RX ADMIN — LEVETIRACETAM 500 MG: 100 INJECTION INTRAVENOUS at 10:15

## 2022-12-05 RX ADMIN — APIXABAN 2.5 MG: 2.5 TABLET, FILM COATED ORAL at 12:23

## 2022-12-05 NOTE — PROGRESS NOTES
Neurology follow up  EEG shows no focality or epileptiform activity.  Also reviewed normal/negative EEGs from 5/2022 and 12/2021    No indication to keep patient on long term seizure medication at this time.  Recommend maximizing management of glucose.    Neurology signing off, please call if needed further.

## 2022-12-05 NOTE — NURSING NOTE
"Diabetes Education  Assessment/Teaching    Patient Name:  Zaina Martinez  YOB: 1965  MRN: [id number]  Admit Date:  12/4/2022      Assessment Date:  12/5/2022  Flowsheet Row Most Recent Value   General Information     Referral From: APRN/MD order. Meet with 58 y/o at bedside.    Height 157.5 cm (62.01\")   Height Method Stated   Weight 57.4 kg (126 lb 8.7 oz)   Weight Method Stated   Diabetes History    What type of diabetes do you have? Type 2   Length of Diabetes Diagnosis 10 + years   Have you had diabetes education/teaching in the past? yes -please see note from DM educator as needed- on 10/28/22 from recent admission.   Do you have any diabetes complications? heart disease, nephropathy/ESRD, prior cva   Education Preferences    Barriers to Learning -- pt is trying to open packages for breakfast. desire/motvation. (pt states she is not taking it \"like I should\" -home BG checks, insulin.)   Assessment Topics    Reducing Risk - Assessment Needs education/Review.   Healthy Coping - Assessment Competent   DM Goals    Contact Plan Follow-up medical care          Flowsheet Row Most Recent Value   DM Education Needs    Reducing Risks Foot care   Healthy Coping Appropriate   Discharge Plan Follow-up with MD   Teaching Method Discussion   Patient Response Verbalized understanding      Other Comments: No new educational needs id'd this morning. Write DM educator  phone no for pt on white board in room for new needs.   Electronically signed by:  Caroline pEps, RN, BSN  12/05/22 12:48 EST  "

## 2022-12-05 NOTE — H&P
Patient Name:  Zaina Martinez  YOB: 1965  MRN:  [id number]  Admit Date:  12/4/2022  Patient Care Team:  Jane Kyle NP as PCP - General (Family Medicine)  Adonis Fraga Jr., MD as Consulting Physician (Hematology and Oncology)      Subjective   History Present Illness     Chief Complaint   Patient presents with   • Seizures       Ms. Martinez is a 57 y.o. female with a history of multiple medical issues including prior CVA, CHF, anemia, CAD, hypothyroidism, DM, ESRD on HD, dialysis catheter thrombus, hx ICH, afib on AC, ureteral stent that presents to [facility] complaining of seizure. She is unsure of events but states the last thing she remembers is sitting in her WC brushing her hair. She states her  told her he heard her make a noise and found her to be having seizure activity. Unknown if there was loss of bladder or bowel control. She does not believe she has ever had a seizure before. She is unsure if she hit her head or fell out of her wc. She is dialysis dependent, last session on Friday; denies complications. She denies recent fever, chest pain, shortness of breath. She had n/v/d ~ 2 days ago that resolved that she relates to drinking a protein shake. She can ambulate with a walker short distances, otherwise she uses a WC. She was last hospitalized 11/8/22-11/24/22 for rt intertrochanteric femur fx s/p repair and rt hip hematoma. She had postop hypotension and bradycardia, diltiazem was stopped. She was also found to have elevated TSH where levothyroxine dose was increased. She was discharged to rehab but did not stay due to delay in medications.     Afebrile. HR controlled. BP 180s-170s systolic. O2 2L NC. Procal 0.12. WBC 9.81, Hgb 8.2. INR 1.27. TSH 57.700. Mg 1.9. Gluc 308, BUN/Cr 36/3.08, Na 129, K 5.7, Cl 89, Ca 8.4, Alk 211. Trop 0.154. CTH: mild residual lt frontal lobe of prior inatraparenchymal hematoma w/no acute ICH; no change of mild small vessel ischemic  disease. CXR: central pulm venous congestion suggestive of interstitial and alveolar pulm edema; small hailey pl effusions; superimposed pneumonia difficult to exclude.    Review of Systems   Constitutional: Negative for chills and fever.   HENT: Negative for congestion.    Respiratory: Negative for shortness of breath.    Cardiovascular: Negative for chest pain.   Gastrointestinal: Negative for diarrhea, nausea and vomiting.   Genitourinary: Negative for dysuria.   Musculoskeletal: Positive for arthralgias.   Skin: Negative for rash.   Neurological: Positive for seizures.   Psychiatric/Behavioral: Negative for sleep disturbance.        Personal History     Past Medical History:   Diagnosis Date   • Acute CVA (cerebrovascular accident) ([facility]) 05/01/2022   • Acute on chronic diastolic CHF (congestive heart failure) ([facility])    • Anemia    • CAD (coronary artery disease) 12/20/2021   • Diabetes ([facility])    • Disease of thyroid gland    • GERD (gastroesophageal reflux disease)    • History of intracranial hemorrhage 5/1/2022   • Hyperlipidemia 11/30/2018   • Hypertension    • Rheumatoid arthritis ([facility])    • Stage 5 chronic kidney disease ([facility])      Past Surgical History:   Procedure Laterality Date   • CHOLECYSTECTOMY WITH INTRAOPERATIVE CHOLANGIOGRAM N/A 1/10/2022    Procedure: Laparoscopic cholecystectomy with intraoperative cholangiogram;  Surgeon: Ramana Raygoza MD;  Location: [facility];  Service: General;  Laterality: N/A;   • CYSTOSCOPY W/ URETERAL STENT PLACEMENT Left 9/29/2022    Procedure: CYSTOSCOPY URETERAL STENT INSERTION WITH RETROGRADE PYELOGRAM;  Surgeon: Bryant Phan Jr., MD;  Location: [facility];  Service: Urology;  Laterality: Left;   • EYE SURGERY     • FEMUR IM NAILING/RODDING Right 11/10/2022    Procedure: FEMUR INTRAMEDULLARY NAILING/RODDING;  Surgeon: Glen Pierce MD;  Location: [facility];  Service: Orthopedics;  Laterality: Right;   • HIP INTERTROCHANTERIC NAILING  Right 11/10/2022    Procedure: HIP INTERTROCHANTERIC NAILING;  Surgeon: Glen Pierce MD;  Location: [facility] OR;  Service: Orthopedics;  Laterality: Right;   • HYSTERECTOMY     • INSERTION HEMODIALYSIS CATHETER N/A 12/6/2021    Procedure: HEMODIALYSIS CATHETER INSERTION;  Surgeon: Keli Salazar MD;  Location: [facility] OR 18/19;  Service: Vascular;  Laterality: N/A;   • INSERTION HEMODIALYSIS CATHETER N/A 5/3/2022    Procedure: TUNNELED CATHETER PLACEMENT;  Surgeon: Keli Salazar MD;  Location: [facility] MAIN OR;  Service: Vascular;  Laterality: N/A;   • MUSCLE BIOPSY Left 6/15/2022    Procedure: Left quadriceps muscle biopsy;  Surgeon: Marques Ma MD;  Location: [facility] OR;  Service: Neurosurgery;  Laterality: Left;     Family History   Problem Relation Age of Onset   • Malig Hyperthermia Neg Hx      Social History     Tobacco Use   • Smoking status: Never   • Smokeless tobacco: Never   Vaping Use   • Vaping Use: Never used   Substance Use Topics   • Alcohol use: Never   • Drug use: Never     No current facility-administered medications on file prior to encounter.     Current Outpatient Medications on File Prior to Encounter   Medication Sig Dispense Refill   • amLODIPine (NORVASC) 5 MG tablet Take 1 tablet by mouth Daily.     • apixaban (ELIQUIS) 2.5 MG tablet tablet Take 1 tablet by mouth Every 12 (Twelve) Hours. Indications: Atrial Fibrillation 60 tablet 0   • carvedilol (COREG) 25 MG tablet Take 1 tablet by mouth 2 (Two) Times a Day With Meals. 60 tablet 2   • folic acid (FOLVITE) 1 MG tablet Take 1 mg by mouth Daily.     • insulin lispro (ADMELOG) 100 UNIT/ML injection Inject 0-7 Units under the skin into the appropriate area as directed 3 (Three) Times a Day Before Meals.     • lactulose (CHRONULAC) 10 GM/15ML solution Take 15 mL by mouth 2 (Two) Times a Day.     • levothyroxine (SYNTHROID, LEVOTHROID) 25 MCG tablet Take 11 tablets by mouth Every Morning.     • melatonin  3 MG tablet Take 1 tablet by mouth Every Night. 30 tablet 0   • miconazole (MICOTIN) 2 % cream Apply 1 application topically to the appropriate area as directed Every 12 (Twelve) Hours.     • oxyCODONE-acetaminophen (PERCOCET) 7.5-325 MG per tablet Take 1 tablet by mouth Every 4 (Four) Hours As Needed for Moderate Pain or Severe Pain. 18 tablet 0   • pantoprazole (PROTONIX) 40 MG EC tablet Take 1 tablet by mouth Daily. 90 tablet 0   • polyethylene glycol (MIRALAX) 17 g packet Take 17 g by mouth Daily As Needed (constipation).     • rOPINIRole (REQUIP) 1 MG tablet Take 2 tablets by mouth Every Night for 30 days. Take 1 hour before bedtime. 60 tablet 0   • sennosides-docusate (PERICOLACE) 8.6-50 MG per tablet Take 2 tablets by mouth Every Night.     • sertraline (ZOLOFT) 50 MG tablet Take 3 tablets by mouth Daily. 90 tablet 1     Allergies   Allergen Reactions   • Contrast Dye Confusion   • Ibuprofen Other (See Comments)     Kidney disease   • Prochlorperazine Other (See Comments)     Dystonic reaction            Objective    Objective     Vital Signs  Temp:  [97.7 °F (36.5 °C)-98.6 °F (37 °C)] 97.7 °F (36.5 °C)  Heart Rate:  [55-68] 66  Resp:  [20-22] 20  BP: (166-184)/() 177/84  SpO2:  [93 %-100 %] 94 %  on  Flow (L/min):  [2] 2;   Device (Oxygen Therapy): nasal cannula  Body mass index is 23.14 kg/m².    Physical Exam  Vitals and nursing note reviewed.   Constitutional:       General: She is not in acute distress.     Appearance: She is ill-appearing.   HENT:      Head: Normocephalic.      Mouth/Throat:      Mouth: Mucous membranes are moist.      Comments: Small superficial laceration tongue  Eyes:      Conjunctiva/sclera: Conjunctivae normal.   Cardiovascular:      Rate and Rhythm: Normal rate and regular rhythm.   Pulmonary:      Effort: Pulmonary effort is normal. No respiratory distress.      Breath sounds: No wheezing or rales.   Abdominal:      General: Bowel sounds are normal. There is no distension.       Palpations: Abdomen is soft.   Musculoskeletal:      Cervical back: Neck supple.      Right lower leg: No edema.      Left lower leg: No edema.   Skin:     General: Skin is warm and dry.      Comments: Dressing rt hip/thigh CDI   Neurological:      Mental Status: She is alert and oriented to person, place, and time.   Psychiatric:         Mood and Affect: Mood normal.         Behavior: Behavior normal.         Results Review:  I reviewed the patient's new clinical results.  I reviewed the patient's new imaging results and agree with the interpretation.  I reviewed the patient's other test results and agree with the interpretation  I personally viewed and interpreted the patient's EKG/Telemetry data    Lab Results (last 24 hours)     Procedure Component Value Units Date/Time    Troponin [[id number]]  (Abnormal) Collected: 12/04/22 1942    Specimen: Blood Updated: 12/04/22 2100     Troponin T 0.154 ng/mL     Narrative:      Troponin T Reference Range:  <= 0.03 ng/mL-   Negative for AMI  >0.03 ng/mL-     Abnormal for myocardial necrosis.  Clinicians would have to utilize clinical acumen, EKG, Troponin and serial changes to determine if it is an Acute Myocardial Infarction or myocardial injury due to an underlying chronic condition.       Results may be falsely decreased if patient taking Biotin.      CBC & Differential [[id number]]  (Abnormal) Collected: 12/04/22 1942    Specimen: Blood Updated: 12/04/22 2005    Narrative:      The following orders were created for panel order CBC & Differential.  Procedure                               Abnormality         Status                     ---------                               -----------         ------                     CBC Auto Differential[[id number]]        Abnormal            Final result                 Please view results for these tests on the individual orders.    Comprehensive Metabolic Panel [[id number]]  (Abnormal) Collected: 12/04/22 1942    Specimen: Blood  Updated: 12/04/22 2038     Glucose 308 mg/dL      BUN 36 mg/dL      Creatinine 3.08 mg/dL      Sodium 129 mmol/L      Potassium 5.7 mmol/L      Chloride 89 mmol/L      CO2 28.2 mmol/L      Calcium 8.4 mg/dL      Total Protein 6.6 g/dL      Albumin 3.50 g/dL      ALT (SGPT) <5 U/L      AST (SGOT) 15 U/L      Alkaline Phosphatase 211 U/L      Total Bilirubin 0.3 mg/dL      Globulin 3.1 gm/dL      A/G Ratio 1.1 g/dL      BUN/Creatinine Ratio 11.7     Anion Gap 11.8 mmol/L      eGFR 17.1 mL/min/1.73      Comment: National Kidney Foundation and American Society of Nephrology (ASN) Task Force recommended calculation based on the Chronic Kidney Disease Epidemiology Collaboration (CKD-EPI) equation refit without adjustment for race.       Narrative:      GFR Normal >60  Chronic Kidney Disease <60  Kidney Failure <15      Magnesium [[id number]]  (Normal) Collected: 12/04/22 1942    Specimen: Blood Updated: 12/04/22 2038     Magnesium 1.9 mg/dL     TSH [[id number]]  (Abnormal) Collected: 12/04/22 1942    Specimen: Blood Updated: 12/04/22 2044     TSH 57.700 uIU/mL     Protime-INR [[id number]]  (Abnormal) Collected: 12/04/22 1942    Specimen: Blood Updated: 12/04/22 2009     Protime 16.1 Seconds      INR 1.27    CBC Auto Differential [[id number]]  (Abnormal) Collected: 12/04/22 1942    Specimen: Blood Updated: 12/04/22 2005     WBC 9.81 10*3/mm3      RBC 2.97 10*6/mm3      Hemoglobin 8.2 g/dL      Hematocrit 25.7 %      MCV 86.5 fL      MCH 27.6 pg      MCHC 31.9 g/dL      RDW 16.1 %      RDW-SD 51.0 fl      MPV 10.8 fL      Platelets 180 10*3/mm3      Neutrophil % 80.0 %      Lymphocyte % 12.0 %      Monocyte % 5.8 %      Eosinophil % 1.1 %      Basophil % 0.4 %      Immature Grans % 0.7 %      Neutrophils, Absolute 7.84 10*3/mm3      Lymphocytes, Absolute 1.18 10*3/mm3      Monocytes, Absolute 0.57 10*3/mm3      Eosinophils, Absolute 0.11 10*3/mm3      Basophils, Absolute 0.04 10*3/mm3      Immature Grans, Absolute 0.07 10*3/mm3  "     nRBC 0.0 /100 WBC     Procalcitonin [[id number]]  (Normal) Collected: 12/04/22 1942    Specimen: Blood Updated: 12/04/22 2323     Procalcitonin 0.12 ng/mL     Narrative:      As a Marker for Sepsis (Non-Neonates):    1. <0.5 ng/mL represents a low risk of severe sepsis and/or septic shock.  2. >2 ng/mL represents a high risk of severe sepsis and/or septic shock.    As a Marker for Lower Respiratory Tract Infections that require antibiotic therapy:    PCT on Admission    Antibiotic Therapy       6-12 Hrs later    >0.5                Strongly Recommended  >0.25 - <0.5        Recommended   0.1 - 0.25          Discouraged              Remeasure/reassess PCT  <0.1                Strongly Discouraged     Remeasure/reassess PCT    As 28 day mortality risk marker: \"Change in Procalcitonin Result\" (>80% or <=80%) if Day 0 (or Day 1) and Day 4 values are available. Refer to http://[email or web address] [email or web address]    Change in PCT <=80%  A decrease of PCT levels below or equal to 80% defines a positive change in PCT test result representing a higher risk for 28-day all-cause mortality of patients diagnosed with severe sepsis for septic shock.    Change in PCT >80%  A decrease of PCT levels of more than 80% defines a negative change in PCT result representing a lower risk for 28-day all-cause mortality of patients diagnosed with severe sepsis or septic shock.       Basic Metabolic Panel [[id number]]  (Abnormal) Collected: 12/05/22 0533    Specimen: Blood from Arm, Left Updated: 12/05/22 0624     Glucose 168 mg/dL      BUN 44 mg/dL      Creatinine 2.96 mg/dL      Sodium 132 mmol/L      Potassium 5.1 mmol/L      Chloride 94 mmol/L      CO2 29.0 mmol/L      Calcium 7.9 mg/dL      BUN/Creatinine Ratio 14.9     Anion Gap 9.0 mmol/L      eGFR 17.9 mL/min/1.73      Comment: National Kidney Foundation and American Society of Nephrology (ASN) Task Force recommended calculation based on the Chronic Kidney Disease Epidemiology " Collaboration (CKD-EPI) equation refit without adjustment for race.       Narrative:      GFR Normal >60  Chronic Kidney Disease <60  Kidney Failure <15      CBC & Differential [[id number]]  (Abnormal) Collected: 12/05/22 0533    Specimen: Blood from Arm, Left Updated: 12/05/22 0608    Narrative:      The following orders were created for panel order CBC & Differential.  Procedure                               Abnormality         Status                     ---------                               -----------         ------                     CBC Auto Differential[[id number]]        Abnormal            Final result                 Please view results for these tests on the individual orders.    CBC Auto Differential [[id number]]  (Abnormal) Collected: 12/05/22 0533    Specimen: Blood from Arm, Left Updated: 12/05/22 0608     WBC 7.75 10*3/mm3      RBC 2.55 10*6/mm3      Hemoglobin 7.3 g/dL      Hematocrit 23.7 %      MCV 92.9 fL      MCH 28.6 pg      MCHC 30.8 g/dL      RDW 16.7 %      RDW-SD 55.9 fl      MPV 10.0 fL      Platelets 145 10*3/mm3      Neutrophil % 68.4 %      Lymphocyte % 22.7 %      Monocyte % 6.7 %      Eosinophil % 1.3 %      Basophil % 0.3 %      Immature Grans % 0.6 %      Neutrophils, Absolute 5.30 10*3/mm3      Lymphocytes, Absolute 1.76 10*3/mm3      Monocytes, Absolute 0.52 10*3/mm3      Eosinophils, Absolute 0.10 10*3/mm3      Basophils, Absolute 0.02 10*3/mm3      Immature Grans, Absolute 0.05 10*3/mm3      nRBC 0.0 /100 WBC     POC Glucose Once [[id number]]  (Abnormal) Collected: 12/05/22 0810    Specimen: Blood Updated: 12/05/22 0823     Glucose 187 mg/dL      Comment: Meter: [id number] : [id number] Erlin MAGAÑA             Imaging Results (Last 24 Hours)     Procedure Component Value Units Date/Time    CT Head Without Contrast [[id number]] Collected: 12/04/22 2214     Updated: 12/04/22 2225    Narrative:      EXAM:  CT HEAD WO CONTRAST-     HISTORY:  New onset seizure, no trauma.      COMPARISON:  CT brain 9/27/2022. MR brain 4/30/2022     TECHNIQUE: Noncontrast images of the brain were obtained. Reformatted  images were reviewed. Radiation dose reduction techniques were utilized,  including automated exposure control and exposure modulation based on  body size.     FINDINGS:       There is mild global parenchymal volume loss. There is linear high  density in the anterior superior left frontal lobe in the region of the  patient's prior intraparenchymal hematoma, which is not significantly  changed from 9/27/2022. No acute intracranial hemorrhage or pathologic  extra-axial collection is identified.  There is no midline shift or mass  effect.  The basal cisterns are patent. There is mild chronic small  vessel ischemic disease, not significantly changed. The grey-white  matter differentiation is maintained. There is calcific intracranial  atherosclerosis.          Impression:      Mild residual linear hypodensity in the left frontal lobe in the region  of the patient's prior intraparenchymal hematoma with no acute  intracranial hemorrhage identified. Mild small vessel ischemic disease,  not significantly changed. Consider further evaluation with MRI.     Discussed with Dr. Nam at 6 10:22 PM.     This report was finalized on 12/4/2022 10:22 PM by Dr. Ananya Desouza M.D.       XR Chest 1 View [[id number]] Collected: 12/04/22 2102     Updated: 12/04/22 2107    Narrative:      XR CHEST 1 VW-     HISTORY:  Seizure, dysrhythmia.     COMPARISON:  Chest radiograph 11/8/2022     FINDINGS:    A single view of the chest was obtained. There is a right IJ central  line. The cardiac silhouette is upper normal in size. There is central  pulmonary venous congestion with diffuse perihilar predominant airspace  opacities and prominent interstitial opacities, including Kerley B  lines, suggestive of interstitial and alveolar pulmonary edema.  Superimposed pneumonia would be difficult to exclude in the  appropriate  clinical setting. There are small bilateral pleural effusions. There is  multilevel degenerative disc disease.     This report was finalized on 12/4/2022 9:03 PM by Dr. Ananya Desouza M.D.             Results for orders placed during the hospital encounter of 11/08/22    Adult Transthoracic Echo Complete W/ Cont if Necessary Per Protocol    Interpretation Summary  •  Left ventricular systolic function is low normal. Left ventricular ejection fraction appears to be 51 - 55%.  •  Left ventricular diastolic function is consistent with (grade II w/high LAP) pseudonormalization.  •  Mildly reduced right ventricular systolic function noted.  •  The right ventricular cavity is mild to moderately dilated.  •  The right atrial cavity is moderately  dilated.  •  Moderate mitral valve regurgitation is present.  •  Severe tricuspid valve regurgitation is present.  •  Estimated right ventricular systolic pressure from tricuspid regurgitation is mildly elevated (35-45 mmHg).  •  There is a large left and right pleural effusion.      ECG 12 Lead Other; Dysrhythmia   Final Result   HEART RATE= 59  bpm   RR Interval= 1017  ms   NC Interval= 167  ms   P Horizontal Axis= 4  deg   P Front Axis= 51  deg   QRSD Interval= 108  ms   QT Interval= 490  ms   QRS Axis= -29  deg   T Wave Axis=   deg   - ABNORMAL ECG -   Sinus rhythm   Borderline left axis deviation   Nonspecific intraventricular conduction delay   Probable anterior infarct, age indeterminate   When compared with ECG of 13-Nov-2022 12:12:30,   Sinus rhythm has replaced junctional rhythm   Electronically Signed By: Marv Paredes (MARLEEN) 05-Dec-2022 09:15:06   Date and Time of Study: 2022-12-04 19:45:47           Assessment/Plan     Active Hospital Problems    Diagnosis  POA   • **Witnessed seizure-like activity (HCC) [R56.9]  Yes   • Ureteral stent present [Z96.0]  Not Applicable   • Closed intertrochanteric fracture of right femur (HCC) [S72.141A]  Yes   •  Hyperkalemia [E87.5]  Yes   • Type 2 diabetes mellitus with hyperglycemia, with long-term current use of insulin (HCC) [E11.65, Z79.4]  Not Applicable   • Atrial flutter (HCC) [I48.92]  Yes   • Thrombus of venous dialysis catheter ([facility]) [T82.868A]  Yes   • History of stroke- R MCA s/p TPA with subsequent ICH with debility [Z86.73]  Not Applicable   • History of intracranial hemorrhage [Z86.79]  Not Applicable   • Hyponatremia [E87.1]  Yes   • Anemia due to chronic kidney disease, on chronic dialysis (HCC) [N18.6, D63.1, Z99.2]  Not Applicable   • Chronic diastolic CHF (congestive heart failure) ([facility]) [I50.32]  Yes   • ESRD (end stage renal disease) ([facility]) [N18.6]  Yes   • Hypothyroidism [E03.9]  Yes      Resolved Hospital Problems   No resolved problems to display.       Ms. Martinez is a 57 y.o. female with a history of multiple medical issues including prior CVA, CHF, anemia, CAD, hypothyroidism, DM, ESRD on HD, dialysis catheter thrombus, hx ICH, afib on AC, ureteral stent, recent IM nailing of rt intertrochanteric femur fx who is admitted for new onset seizure, hyperkalemia, hyponatremia      Seizure/Hx CVA/Hx ICH:  -CTH without significant change of intraparenchymal hematoma without acute ICH. Continue IV keppra. Neurology consult. Seizure precautions. Lorazepam as needed    ESRD/Hyperkalemia/Hyponatremia/Thrombus of venous dialysis catheter:  -Nephrology consulted to manage. Monitor electrolytes. Eliquis for AC. Dialysis MWF    Afib/flutter/Chronic CHF:  -HR controlled. Denies chest pain. Elevated troponin due to renal insufficiency. Monitor on telemetry. Continue carvedilol. Diltiazem stopped last admission due to bradycardia. On eliquis    Anemia:  -Likely due to CKD. Nephrology checking iron panel. Monitor Hgb    Hypothyroidism:  -Elevated TSH. Levothyroxine dose increased last admission to 275 mcg. Recommend repeat TFT's in 4 weeks    Ureteral stent:  -In place due to hydronephrosis. Consider Urology consult as  pt has follow up with them tomorrow    Rt intertrochanteric femur fx:  -S/P IM nailing by Dr. Pierce 11/10/22. PT eval    DM:  -Monitor BG trends. Long acting insulin stopped last admission. Continue correctional scale. A1C 10%. Diabetes educator consult    · I discussed the patient's findings and my recommendations with patient, nursing staff and Dr. Templeton.    VTE Prophylaxis - Eliquis (home med).  Code Status - Full code.       RIKA William  [facility]  12/05/22  11:16 EST

## 2022-12-05 NOTE — ED NOTES
.Nursing report ED to floor  Zaina Martinez  57 y.o.  female    HPI :   Chief Complaint   Patient presents with    Seizures       Admitting doctor:   Eddie Givens MD    Admitting diagnosis:   The primary encounter diagnosis was Seizure (HCC). Diagnoses of Hyponatremia, Hyperkalemia, and Abnormal chest x-ray were also pertinent to this visit.    Code status:   Current Code Status       Date Active Code Status Order ID Comments User Context       12/4/2022 2358 CPR (Attempt to Resuscitate) [id number]  Anila Gandara APRN ED        Question Answer    Code Status (Patient has no pulse and is not breathing) CPR (Attempt to Resuscitate)    Medical Interventions (Patient has pulse or is breathing) Full Support                    Allergies:   Contrast dye, Ibuprofen, and Prochlorperazine    Isolation:   No active isolations    Intake and Output  No intake or output data in the 24 hours ending 12/05/22 0523    Weight:       12/04/22 1934   Weight: 57.4 kg (126 lb 8.7 oz)       Most recent vitals:   Vitals:    12/05/22 0152 12/05/22 0321 12/05/22 0421 12/05/22 0422   BP:  172/89 174/86    Pulse: 61 60 60    Resp:       Temp:       TempSrc:       SpO2: 99% 99%  93%   Weight:       Height:           Active LDAs/IV Access:   Lines, Drains & Airways       Active LDAs       Name Placement date Placement time Site Days    Peripheral IV 12/04/22 1915 Left Hand 12/04/22 1915  Hand  ring finger  less than 1    Peripheral IV 12/04/22 1943 Left Antecubital 12/04/22 1943  Antecubital  less than 1    Hemodialysis Cath Double 05/03/22  0909  Internal Jugular  215                    Labs (abnormal labs have a star):   Labs Reviewed   TROPONIN (IN-HOUSE) - Abnormal; Notable for the following components:       Result Value    Troponin T 0.154 (*)     All other components within normal limits    Narrative:     Troponin T Reference Range:  <= 0.03 ng/mL-   Negative for AMI  >0.03 ng/mL-     Abnormal for myocardial necrosis.  Clinicians  "would have to utilize clinical acumen, EKG, Troponin and serial changes to determine if it is an Acute Myocardial Infarction or myocardial injury due to an underlying chronic condition.       Results may be falsely decreased if patient taking Biotin.     COMPREHENSIVE METABOLIC PANEL - Abnormal; Notable for the following components:    Glucose 308 (*)     BUN 36 (*)     Creatinine 3.08 (*)     Sodium 129 (*)     Potassium 5.7 (*)     Chloride 89 (*)     Calcium 8.4 (*)     Alkaline Phosphatase 211 (*)     eGFR 17.1 (*)     All other components within normal limits    Narrative:     GFR Normal >60  Chronic Kidney Disease <60  Kidney Failure <15     TSH - Abnormal; Notable for the following components:    TSH 57.700 (*)     All other components within normal limits   PROTIME-INR - Abnormal; Notable for the following components:    Protime 16.1 (*)     INR 1.27 (*)     All other components within normal limits   CBC WITH AUTO DIFFERENTIAL - Abnormal; Notable for the following components:    RBC 2.97 (*)     Hemoglobin 8.2 (*)     Hematocrit 25.7 (*)     RDW 16.1 (*)     Neutrophil % 80.0 (*)     Lymphocyte % 12.0 (*)     Immature Grans % 0.7 (*)     Neutrophils, Absolute 7.84 (*)     Immature Grans, Absolute 0.07 (*)     All other components within normal limits   MAGNESIUM - Normal   PROCALCITONIN - Normal    Narrative:     As a Marker for Sepsis (Non-Neonates):    1. <0.5 ng/mL represents a low risk of severe sepsis and/or septic shock.  2. >2 ng/mL represents a high risk of severe sepsis and/or septic shock.    As a Marker for Lower Respiratory Tract Infections that require antibiotic therapy:    PCT on Admission    Antibiotic Therapy       6-12 Hrs later    >0.5                Strongly Recommended  >0.25 - <0.5        Recommended   0.1 - 0.25          Discouraged              Remeasure/reassess PCT  <0.1                Strongly Discouraged     Remeasure/reassess PCT    As 28 day mortality risk marker: \"Change in " "Procalcitonin Result\" (>80% or <=80%) if Day 0 (or Day 1) and Day 4 values are available. Refer to http://[email or web address]-pct-calculator.com    Change in PCT <=80%  A decrease of PCT levels below or equal to 80% defines a positive change in PCT test result representing a higher risk for 28-day all-cause mortality of patients diagnosed with severe sepsis for septic shock.    Change in PCT >80%  A decrease of PCT levels of more than 80% defines a negative change in PCT result representing a lower risk for 28-day all-cause mortality of patients diagnosed with severe sepsis or septic shock.      RAINBOW DRAW    Narrative:     The following orders were created for panel order [place] Draw.  Procedure                               Abnormality         Status                     ---------                               -----------         ------                     Green Top (Gel)[[id number]]                                  Final result               Lavender Top[[id number]]                                     Final result               Gold Top - SST[[id number]]                                   Final result               Light Blue Top[[id number]]                                   Final result                 Please view results for these tests on the individual orders.   BASIC METABOLIC PANEL   BASIC METABOLIC PANEL   CBC WITH AUTO DIFFERENTIAL   POCT GLUCOSE FINGERSTICK   POCT GLUCOSE FINGERSTICK   POCT GLUCOSE FINGERSTICK   POCT GLUCOSE FINGERSTICK   POCT GLUCOSE FINGERSTICK   TYPE AND SCREEN   CBC AND DIFFERENTIAL    Narrative:     The following orders were created for panel order CBC & Differential.  Procedure                               Abnormality         Status                     ---------                               -----------         ------                     CBC Auto Differential[[id number]]        Abnormal            Final result                 Please view results for these tests on the individual orders. "   GREEN TOP   LAVENDER TOP   GOLD TOP - SST   LIGHT BLUE TOP   CBC AND DIFFERENTIAL    Narrative:     The following orders were created for panel order CBC & Differential.  Procedure                               Abnormality         Status                     ---------                               -----------         ------                     CBC Auto Differential[[id number]]                                                         Please view results for these tests on the individual orders.       EKG:   ECG 12 Lead Other; Dysrhythmia   Preliminary Result   HEART RATE= 59  bpm   RR Interval= 1017  ms   ID Interval= 167  ms   P Horizontal Axis= 4  deg   P Front Axis= 51  deg   QRSD Interval= 108  ms   QT Interval= 490  ms   QRS Axis= -29  deg   T Wave Axis=   deg   - ABNORMAL ECG -   Sinus rhythm   Borderline left axis deviation   Probable anterior infarct, age indeterminate   Electronically Signed By:    Date and Time of Study: 2022-12-04 19:45:47          Meds given in ED:   Medications   sodium chloride 0.9 % flush 10 mL (has no administration in time range)   LORazepam (ATIVAN) injection 0.5 mg (has no administration in time range)   sodium chloride 0.9 % flush 10 mL (10 mL Intravenous Given 12/5/22 0042)   sodium chloride 0.9 % flush 10 mL (has no administration in time range)   sodium chloride 0.9 % infusion 40 mL (has no administration in time range)   dextrose (GLUTOSE) oral gel 15 g (has no administration in time range)   dextrose (D50W) (25 g/50 mL) IV injection 25 g (has no administration in time range)   glucagon (human recombinant) (GLUCAGEN DIAGNOSTIC) injection 1 mg (has no administration in time range)   nitroglycerin (NITROSTAT) SL tablet 0.4 mg (has no administration in time range)   insulin lispro (ADMELOG) injection 0-14 Units (has no administration in time range)   LORazepam (ATIVAN) injection 0.5 mg (0.5 mg Intravenous Given 12/4/22 2019)   levETIRAcetam (KEPPRA) injection 1,000 mg (1,000 mg  Intravenous Given 12/4/22 2019)   dextrose (D50W) (25 g/50 mL) IV injection 50 mL (50 mL Intravenous Given 12/5/22 0023)   insulin regular (humuLIN R,novoLIN R) injection 6 Units (6 Units Intravenous Given 12/5/22 0027)   sodium bicarbonate injection 8.4% 50 mEq (50 mEq Intravenous Given 12/5/22 0035)   calcium gluconate 1g/50ml 0.675% NaCl IV SOLN (0 g Intravenous Stopped 12/5/22 0030)   furosemide (LASIX) injection 80 mg (80 mg Intravenous Given 12/5/22 0039)       Imaging results:  CT Head Without Contrast    Result Date: 12/4/2022  Mild residual linear hypodensity in the left frontal lobe in the region of the patient's prior intraparenchymal hematoma with no acute intracranial hemorrhage identified. Mild small vessel ischemic disease, not significantly changed. Consider further evaluation with MRI.  Discussed with Dr. Nam at 6 10:22 PM.  This report was finalized on 12/4/2022 10:22 PM by Dr. Ananya Desouza M.D.       Ambulatory status:   - Bed rest    Social issues:   Social History     Socioeconomic History    Marital status:      Spouse name: Marv   Tobacco Use    Smoking status: Never    Smokeless tobacco: Never   Vaping Use    Vaping Use: Never used   Substance and Sexual Activity    Alcohol use: Never    Drug use: Never    Sexual activity: Defer       NIH Stroke Scale:         Daja Blanc RN  12/05/22 05:23 EST

## 2022-12-05 NOTE — ED PROVIDER NOTES
EMERGENCY DEPARTMENT ENCOUNTER    CHIEF COMPLAINT  Chief Complaint: Seizure  History given by: Patient  History limited by: Patient amnestic to the events leading to her ED visit  Room Number: 37/37  PMD: Jane Kyle NP      HPI:  Pt is a 57 y.o. female presents from home by EMS with report of 3 minutes of shaking, unresponsive for 15 minutes, EMS arrived report patient was confused slowly responsive which is gradually improved in route to the hospital.  Patient denies recent sleep deprivation, cough or cold, fever, changes to her medicines, running out of any medications.  Patient reports he is not a drinker, denies the use of Xanax, Valium, Klonopin, or other benzodiazepines at home.  Patient denies history of seizures.  Patient reports she is felt nauseated, fatigued over the past day.    Duration: 3 minutes of shaking  Associated Symptoms: Poorly responsive after the fact  Aggravating Factors: Unknown  Alleviating Factors: Nothing  Treatment before arrival: EMS transport    PAST MEDICAL HISTORY  Active Ambulatory Problems     Diagnosis Date Noted   • Renal insufficiency 03/16/2018   • Hypertensive disorder 08/25/2021   • Hypothyroidism 11/29/2021   • Type 2 diabetes mellitus with kidney complication, with long-term current use of insulin ([facility]) 05/01/2018   • Rheumatoid arthritis ([facility]) 03/30/2021   • Angioedema 11/30/2021   • Esophageal dysmotility 10/15/2021   • Anemia 03/16/2018   • Medically noncompliant 12/01/2021   • Myocardial infarction due to demand ischemia ([facility]) 12/01/2021   • Enteritis 12/03/2021   • PRES (posterior reversible encephalopathy syndrome) 12/05/2021   • Urine retention 12/08/2021   • Klebsiella infection 12/08/2021   • Superficial thrombophlebitis 12/10/2021   • Generalized weakness 12/19/2021   • ESRD (end stage renal disease) ([facility]) 12/20/2021   • CAD (coronary artery disease) 12/20/2021   • Abnormal urinalysis 12/20/2021   • Chronic diastolic CHF (congestive heart failure) ([facility])  12/25/2021   • Pyelonephritis 01/09/2022   • Calculus of gallbladder with acute on chronic cholecystitis without obstruction 01/09/2022   • Pleural effusion on right 01/09/2022   • Anemia due to chronic kidney disease, on chronic dialysis (HCC) 01/11/2022   • Abnormal findings on diagnostic imaging of other specified body structures 04/04/2017   • Acute upper respiratory infection 10/31/2018   • Agitation 03/30/2021   • Alkaline phosphatase raised 03/16/2018   • Casts present in urine 03/29/2021   • Cellulitis of toe 11/15/2019   • Hip pain 06/19/2020   • Community acquired pneumonia 02/13/2017   • Depressive disorder 10/31/2018   • Diarrhea of presumed infectious origin 03/30/2021   • Difficult or painful urination 08/11/2020   • Disease due to severe acute respiratory syndrome coronavirus 2 (SARS-CoV-2) 03/30/2021   • Dyspnea 11/15/2019   • Encounter for follow-up examination after completed treatment for conditions other than malignant neoplasm 02/13/2017   • H/O: hypothyroidism 08/25/2021   • Hyperlipidemia 11/30/2018   • Hypomagnesemia 03/30/2021   • Intractable vomiting with nausea 03/29/2021   • 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   • Need for influenza vaccination 11/30/2018   • Restless legs 11/15/2019   • Noncompliance with treatment 10/31/2018   • Shoulder pain 06/19/2020   • Acute UTI (urinary tract infection) 07/17/2018   • Metabolic encephalopathy 02/24/2022   • Abnormal findings on diagnostic imaging of abdomen 02/24/2022   • Status post cholecystectomy 02/24/2022   • Hyponatremia 02/24/2022   • Leukocytosis 02/24/2022   • Acute metabolic encephalopathy 04/27/2022   • Encephalopathy, toxic 04/28/2022   • Acute CVA (cerebrovascular accident) ([facility]) 05/01/2022   • History of intracranial hemorrhage 05/01/2022   • Stroke ([facility]) 05/13/2022   • Abnormal urinalysis 06/24/2022   • Diabetic muscle infarction ([facility]) 07/01/2022   • Other insomnia 07/01/2022   • Altered mental status 07/27/2022   • History of stroke- R  MCA s/p TPA with subsequent ICH with debility 07/27/2022   • Heart murmur 07/27/2022   • Bradycardia 07/27/2022   • Left lower lobe pneumonia 07/27/2022   • Metabolic encephalopathy 07/29/2022   • Pericardial effusion 08/20/2022   • Pleural effusion 08/20/2022   • Acute pulmonary edema ([facility]) 09/03/2022   • Atrial flutter ([facility]) 09/04/2022   • Chronic systolic CHF (congestive heart failure) ([facility]) 09/04/2022   • Thrombus of venous dialysis catheter ([facility]) 09/04/2022   • Sepsis without acute organ dysfunction ([facility]) 09/27/2022   • Type 2 diabetes mellitus with hyperglycemia, with long-term current use of insulin ([facility]) 10/06/2022   • Acute respiratory failure with hypoxia ([facility]) 10/06/2022   • Acute on chronic systolic CHF (congestive heart failure) ([facility]) 10/06/2022   • Acute respiratory failure with hypoxia ([facility]) 10/27/2022   • Other hypervolemia 10/31/2022   • Hematoma of right hip, initial encounter 11/09/2022   • Ureteral stent present 11/09/2022   • Closed intertrochanteric fracture of right femur ([facility]) 11/09/2022     Resolved Ambulatory Problems     Diagnosis Date Noted   • Hypertensive urgency 11/30/2021   • Leukocytosis 03/16/2018   • Hyperkalemia 10/12/2022     Past Medical History:   Diagnosis Date   • Acute on chronic diastolic CHF (congestive heart failure) ([facility])    • Diabetes ([facility])    • Disease of thyroid gland    • GERD (gastroesophageal reflux disease)    • Hypertension    • Stage 5 chronic kidney disease ([facility])        PAST SURGICAL HISTORY  Past Surgical History:   Procedure Laterality Date   • CHOLECYSTECTOMY WITH INTRAOPERATIVE CHOLANGIOGRAM N/A 1/10/2022    Procedure: Laparoscopic cholecystectomy with intraoperative cholangiogram;  Surgeon: Ramana Raygoza MD;  Location: [facility];  Service: General;  Laterality: N/A;   • CYSTOSCOPY W/ URETERAL STENT PLACEMENT Left 9/29/2022    Procedure: CYSTOSCOPY URETERAL STENT INSERTION WITH RETROGRADE PYELOGRAM;  Surgeon: Bryant Phan Jr., MD;  Location:  [facility] OR;  Service: Urology;  Laterality: Left;   • EYE SURGERY     • FEMUR IM NAILING/RODDING Right 11/10/2022    Procedure: FEMUR INTRAMEDULLARY NAILING/RODDING;  Surgeon: Glen Pierce MD;  Location: [facility] OR;  Service: Orthopedics;  Laterality: Right;   • HIP INTERTROCHANTERIC NAILING Right 11/10/2022    Procedure: HIP INTERTROCHANTERIC NAILING;  Surgeon: Glen Pierce MD;  Location: [facility] OR;  Service: Orthopedics;  Laterality: Right;   • HYSTERECTOMY     • INSERTION HEMODIALYSIS CATHETER N/A 12/6/2021    Procedure: HEMODIALYSIS CATHETER INSERTION;  Surgeon: Keli Salazar MD;  Location: [facility] 18/19;  Service: Vascular;  Laterality: N/A;   • INSERTION HEMODIALYSIS CATHETER N/A 5/3/2022    Procedure: TUNNELED CATHETER PLACEMENT;  Surgeon: Keli Salazar MD;  Location: [facility];  Service: Vascular;  Laterality: N/A;   • MUSCLE BIOPSY Left 6/15/2022    Procedure: Left quadriceps muscle biopsy;  Surgeon: Marques Ma MD;  Location: [facility] OR;  Service: Neurosurgery;  Laterality: Left;       FAMILY HISTORY  Family History   Problem Relation Age of Onset   • Malig Hyperthermia Neg Hx        SOCIAL HISTORY  Social History     Socioeconomic History   • Marital status:      Spouse name: Marv   Tobacco Use   • Smoking status: Never   • Smokeless tobacco: Never   Vaping Use   • Vaping Use: Never used   Substance and Sexual Activity   • Alcohol use: Never   • Drug use: Never   • Sexual activity: Defer       ALLERGIES  Contrast dye, Ibuprofen, and Prochlorperazine    REVIEW OF SYSTEMS  Review of Systems   Constitutional: Positive for fatigue. Negative for chills and fever.   HENT: Negative for rhinorrhea and sore throat.    Eyes: Negative for visual disturbance.   Respiratory: Negative for cough and shortness of breath.    Cardiovascular: Negative for chest pain, palpitations and leg swelling.   Gastrointestinal: Positive for nausea.  Negative for abdominal pain, diarrhea and vomiting.   Endocrine: Negative.    Genitourinary: Negative for decreased urine volume, dysuria and frequency.        Patient reports she has had intermittent bloody urine over the past 1 to 2 months post urinary stent placement.   Musculoskeletal: Negative for neck pain.   Skin: Negative for rash.   Neurological: Positive for seizures. Negative for syncope and headaches.   Psychiatric/Behavioral: Negative.    All other systems reviewed and are negative.      PHYSICAL EXAM  ED Triage Vitals [12/04/22 1913]   Temp Heart Rate Resp BP SpO2   98.4 °F (36.9 °C) 55 22 180/92 98 %      Temp src Heart Rate Source Patient Position BP Location FiO2 (%)   Tympanic Monitor -- -- --       Physical Exam  Vitals and nursing note reviewed.   Constitutional:       General: She is in acute distress (mild).      Appearance: She is not toxic-appearing.   HENT:      Head: Normocephalic and atraumatic.      Comments: Abrasion/small bruises to the tongue without active bleeding     Mouth/Throat:      Mouth: Mucous membranes are moist.   Eyes:      Extraocular Movements: Extraocular movements intact and EOM normal.   Cardiovascular:      Rate and Rhythm: Normal rate and regular rhythm.      Pulses: Intact distal pulses.           Posterior tibial pulses are 2+ on the right side and 2+ on the left side.      Heart sounds: Normal heart sounds. No murmur heard.  Pulmonary:      Effort: Pulmonary effort is normal. No respiratory distress.      Breath sounds: Normal breath sounds.   Abdominal:      General: Bowel sounds are normal.      Palpations: Abdomen is soft.      Tenderness: There is no abdominal tenderness. There is no guarding or rebound.   Musculoskeletal:         General: No edema. Normal range of motion.      Cervical back: Normal range of motion and neck supple.   Skin:     General: Skin is warm and dry.   Neurological:      Mental Status: She is alert and oriented to person, place, and  time.   Psychiatric:         Mood and Affect: Affect normal.         LAB RESULTS  Lab Results (last 24 hours)     Procedure Component Value Units Date/Time    Troponin [[id number]]  (Abnormal) Collected: 12/04/22 1942    Specimen: Blood Updated: 12/04/22 2100     Troponin T 0.154 ng/mL     Narrative:      Troponin T Reference Range:  <= 0.03 ng/mL-   Negative for AMI  >0.03 ng/mL-     Abnormal for myocardial necrosis.  Clinicians would have to utilize clinical acumen, EKG, Troponin and serial changes to determine if it is an Acute Myocardial Infarction or myocardial injury due to an underlying chronic condition.       Results may be falsely decreased if patient taking Biotin.      CBC & Differential [[id number]]  (Abnormal) Collected: 12/04/22 1942    Specimen: Blood Updated: 12/04/22 2005    Narrative:      The following orders were created for panel order CBC & Differential.  Procedure                               Abnormality         Status                     ---------                               -----------         ------                     CBC Auto Differential[[id number]]        Abnormal            Final result                 Please view results for these tests on the individual orders.    Comprehensive Metabolic Panel [[id number]]  (Abnormal) Collected: 12/04/22 1942    Specimen: Blood Updated: 12/04/22 2038     Glucose 308 mg/dL      BUN 36 mg/dL      Creatinine 3.08 mg/dL      Sodium 129 mmol/L      Potassium 5.7 mmol/L      Chloride 89 mmol/L      CO2 28.2 mmol/L      Calcium 8.4 mg/dL      Total Protein 6.6 g/dL      Albumin 3.50 g/dL      ALT (SGPT) <5 U/L      AST (SGOT) 15 U/L      Alkaline Phosphatase 211 U/L      Total Bilirubin 0.3 mg/dL      Globulin 3.1 gm/dL      A/G Ratio 1.1 g/dL      BUN/Creatinine Ratio 11.7     Anion Gap 11.8 mmol/L      eGFR 17.1 mL/min/1.73      Comment: National Kidney Foundation and American Society of Nephrology (ASN) Task Force recommended calculation based on the  Chronic Kidney Disease Epidemiology Collaboration (CKD-EPI) equation refit without adjustment for race.       Narrative:      GFR Normal >60  Chronic Kidney Disease <60  Kidney Failure <15      Magnesium [[id number]]  (Normal) Collected: 12/04/22 1942    Specimen: Blood Updated: 12/04/22 2038     Magnesium 1.9 mg/dL     TSH [[id number]]  (Abnormal) Collected: 12/04/22 1942    Specimen: Blood Updated: 12/04/22 2044     TSH 57.700 uIU/mL     Protime-INR [[id number]]  (Abnormal) Collected: 12/04/22 1942    Specimen: Blood Updated: 12/04/22 2009     Protime 16.1 Seconds      INR 1.27    CBC Auto Differential [[id number]]  (Abnormal) Collected: 12/04/22 1942    Specimen: Blood Updated: 12/04/22 2005     WBC 9.81 10*3/mm3      RBC 2.97 10*6/mm3      Hemoglobin 8.2 g/dL      Hematocrit 25.7 %      MCV 86.5 fL      MCH 27.6 pg      MCHC 31.9 g/dL      RDW 16.1 %      RDW-SD 51.0 fl      MPV 10.8 fL      Platelets 180 10*3/mm3      Neutrophil % 80.0 %      Lymphocyte % 12.0 %      Monocyte % 5.8 %      Eosinophil % 1.1 %      Basophil % 0.4 %      Immature Grans % 0.7 %      Neutrophils, Absolute 7.84 10*3/mm3      Lymphocytes, Absolute 1.18 10*3/mm3      Monocytes, Absolute 0.57 10*3/mm3      Eosinophils, Absolute 0.11 10*3/mm3      Basophils, Absolute 0.04 10*3/mm3      Immature Grans, Absolute 0.07 10*3/mm3      nRBC 0.0 /100 WBC     Procalcitonin [[id number]]  (Normal) Collected: 12/04/22 1942    Specimen: Blood Updated: 12/04/22 2323     Procalcitonin 0.12 ng/mL     Narrative:      As a Marker for Sepsis (Non-Neonates):    1. <0.5 ng/mL represents a low risk of severe sepsis and/or septic shock.  2. >2 ng/mL represents a high risk of severe sepsis and/or septic shock.    As a Marker for Lower Respiratory Tract Infections that require antibiotic therapy:    PCT on Admission    Antibiotic Therapy       6-12 Hrs later    >0.5                Strongly Recommended  >0.25 - <0.5        Recommended   0.1 - 0.25          Discouraged   "            Remeasure/reassess PCT  <0.1                Strongly Discouraged     Remeasure/reassess PCT    As 28 day mortality risk marker: \"Change in Procalcitonin Result\" (>80% or <=80%) if Day 0 (or Day 1) and Day 4 values are available. Refer to http://www.[facility]-pct-calculator.com    Change in PCT <=80%  A decrease of PCT levels below or equal to 80% defines a positive change in PCT test result representing a higher risk for 28-day all-cause mortality of patients diagnosed with severe sepsis for septic shock.    Change in PCT >80%  A decrease of PCT levels of more than 80% defines a negative change in PCT result representing a lower risk for 28-day all-cause mortality of patients diagnosed with severe sepsis or septic shock.             I ordered the above labs and reviewed the results    RADIOLOGY  CT Head Without Contrast   Final Result   Mild residual linear hypodensity in the left frontal lobe in the region   of the patient's prior intraparenchymal hematoma with no acute   intracranial hemorrhage identified. Mild small vessel ischemic disease,   not significantly changed. Consider further evaluation with MRI.       Discussed with Dr. Nam at 6 10:22 PM.       This report was finalized on 12/4/2022 10:22 PM by Dr. Ananya Desouza M.D.          XR Chest 1 View   Final Result       central pulmonary venous congestion with diffuse perihilar predominant airspace opacities and prominent interstitial opacities, including Kerley B lines, suggestive of interstitial and alveolar pulmonary edema.  Superimposed pneumonia would be difficult to exclude in the appropriate clinical setting. There are small bilateral pleural effusions    I ordered the above noted radiological studies. Interpreted by radiologist. Viewed by me in PACS.       PROCEDURES  Procedures      PROGRESS AND CONSULTS  ED Course as of 12/05/22 0019   Sun Dec 04, 2022   2247 Sanam with Dr. Ananya Desouza who reports patient CT head has area of decreased " density left frontal area consistent with history of intracerebral hematoma similar to September imaging [TO]   2258 Discussed with Dr. Hester, on-call for Dr. Villalpando who is aware of patient's presentation, elevated potassium, signs of fluid overload and plan for admission for further evaluation of new onset seizure and agrees to see in consult and order dialysis as needed. [TO]   2307 Discussed with RIKA Miranda on-call for [facility] who is aware of patient's presentation, with new onset seizure, my discussion with nephrology, imaging and lab results and agrees to admit to Dr. Eddie Givens for further testing, treatment as needed. [TO]      ED Course User Index  [TO] Genna Nam MD     EKG          EKG time: 1945  Rhythm/Rate: Sinus rhythm, rate in the 50s  P waves and IN: Normal P waves, normal ANNETTE's  QRS, axis: Poor R wave progression, Q's in inferior leads  ST and T waves: Nonspecific ST/T wave findings diffuse    Interpreted Contemporaneously by me, independently viewed   changed compared to prior 11/13/2022, now in sinus rhythm        MEDICAL DECISION MAKING  Results were reviewed/discussed with the patient and they were also made aware of online access. Pt also made aware that some labs, such as cultures, will not be resulted during ER visit and followup with PMD is necessary.       MDM       DIAGNOSIS  Final diagnoses:   Seizure (HCC)   Hyponatremia   Hyperkalemia   Abnormal chest x-ray       DISPOSITION  ADMISSION    Discussed treatment plan and reason for admission with pt/family and admitting physician.  Pt/family voiced understanding of the plan for admission for further testing/treatment as needed.         Latest Documented Vital Signs:  As of 00:19 EST  BP- 180/87 HR- 56 Temp- 98.4 °F (36.9 °C) (Tympanic) O2 sat- 98%    --  Patient was wearing facemask when I entered the room and throughout our encounter. Full protective equipment was worn throughout this patient encounter including a face mask, eye  protection and gloves. Hand hygiene was performed before donning protective equipment and after removal when leaving the room.      Genna Nam MD  12/05/22 0021

## 2022-12-05 NOTE — PROGRESS NOTES
Discharge Planning Assessment  [facility]     Patient Name: Zaina Martinez  MRN: [id number]  Today's Date: 12/5/2022    Admit Date: 12/4/2022    Plan: Home with family, HH referral pending   Discharge Needs Assessment     Row Name 12/05/22 1539       Living Environment    People in Home spouse;grandchild(pro)    Name(s) of People in Home Yamil Fair [phone]; two grandchildren    Current Living Arrangements home    Primary Care Provided by self    Provides Primary Care For no one    Family Caregiver if Needed spouse    Quality of Family Relationships helpful;involved;supportive    Able to Return to Prior Arrangements yes       Resource/Environmental Concerns    Resource/Environmental Concerns none       Transition Planning    Patient/Family Anticipates Transition to home with family;home with help/services    Patient/Family Anticipated Services at Transition home health care    Transportation Anticipated family or friend will provide       Discharge Needs Assessment    Equipment Currently Used at Home shower chair;walker, rolling;wheelchair;oxygen    Concerns to be Addressed discharge planning    Outpatient/Agency/Support Group Needs homecare agency    Discharge Facility/Level of Care Needs home with home health    Provided Post Acute Provider List? Yes    Post Acute Provider List Home Health    Discharge Coordination/Progress HH referral pending               Discharge Plan     Row Name 12/05/22 1543       Plan    Plan Home with family, HH referral pending    Patient/Family in Agreement with Plan yes    Plan Comments CCP met with pt at bedside to discuss d/c planning. Pt resides with her  and two grandchildren in a home with walk out basement which she does not access. Pt has walker, wheelchair, shower chair, and home O2 via [employer]. Pt's  transports her to/from  at [facility]. Pt has used [facility] in the past, and been to rehab at [facility] and [facility]. Pt declines SNF  referrals, but requests HH referral to [facility]  (denied, referral now pending to [facility]). Pt reports home based primary care via [facility] Pt [facility]. CCP to follow for HH eval and additional needs. Cinthia Riley LCSW              Continued Care and Services - Admitted Since 12/4/2022     Home Medical Care     Service Provider Request Status Selected Services Address Phone Fax Patient Preferred     [facility] Pending - Request Sent N/A [address] [facility] [address] [address] [phone] --    [facility]  Cannot meet patient's needs N/A [address] [facility] [address] [id number] [phone] --            Selected Continued Care - Prior Encounters Includes continued care and service providers with selected services from prior encounters from 9/5/2022 to 12/5/2022    Discharged on 11/24/2022 Admission date: 11/8/2022 - Discharge disposition: Skilled Nursing Facility (DC - External)    Destination     Service Provider Selected Services Address Phone Fax Patient Preferred    [facility] [address] [facility] [id number] [phone] --                Discharged on 11/1/2022 Admission date: 10/26/2022 - Discharge disposition: [place] [facility]    Home Medical Care     Service Provider Selected Services Address Phone Fax Patient Preferred    [facility] [address] [facility] [address] [address] [phone] --                Discharged on 10/17/2022 Admission date: 10/12/2022 - Discharge disposition: [place] [facility]    Home Medical Care     Service Provider Selected Services Address Phone Fax Patient Preferred    [facility] [address] [facility] [address] [id number] [phone] --                Discharged on  10/6/2022 Admission date: 9/26/2022 - Discharge disposition: Home-Health Care [facility]    Durable Medical Equipment     Service Provider Selected Services Address Phone Fax Patient Preferred    [facility] [address] [facility] [address] [phone] --          Home Medical Care     Service Provider Selected Services Address Phone Fax Patient Preferred    [facility] [place] [address] [address] [phone] --                Discharged on 9/13/2022 Admission date: 9/3/2022 - Discharge disposition: Home-Health Care [facility]    Durable Medical Equipment     Service Provider Selected Services Address Phone Fax Patient Preferred    [facility] Medical [facility] [address] [facility] [address] [phone] --          Home Medical Care     Service Provider Selected Services Address Phone Fax Patient Preferred    [facility] [address] [place] [address] [address] [phone] --                    Expected Discharge Date and Time     Expected Discharge Date Expected Discharge Time    Dec 8, 2022          Demographic Summary     Row Name 12/05/22 1539       General Information    Admission Type observation    Arrived From home    Required Notices Provided Observation Status Notice    Referral Source admission list    Reason for Consult discharge planning    Preferred Language English               Functional Status     Row Name 12/05/22 1539       Functional Status    Usual Activity Tolerance moderate    Current Activity Tolerance moderate       Functional Status, IADL    Medications assistive equipment and person    Meal Preparation assistive equipment and person    Housekeeping assistive equipment and person    Laundry assistive equipment and person    Shopping assistive equipment and person        Mental Status Summary    Recent Changes in Mental Status/Cognitive Functioning no changes               Psychosocial    No documentation.                Abuse/Neglect    No documentation.                Legal    No documentation.                Substance Abuse    No documentation.                Patient Forms    No documentation.                   Dana Riley LCSW

## 2022-12-05 NOTE — ED NOTES
Pt is A&Ox4 however is amnesic to seizure today. Pt denies any recent falls or head trauma. Denies headache or recent illness. +anticoagulation, Eliquis. Pt receives dialysis MWF and reports no missed days.

## 2022-12-05 NOTE — DISCHARGE PLACEMENT REQUEST
"Zaina Motta (57 y.o. Female)     Date of Birth   1965    Social Security Number       Address   [address] [place] [address]    Home Phone   [phone]    MRN   [id number]       Zoroastrian   None    Marital Status                               Admission Date   12/4/22    Admission Type   Emergency    Admitting Provider   Eddie Givens MD    Attending Provider   Albert Templeton MD    Department, Room/Bed   [address], P586/1       Discharge Date       Discharge Disposition       Discharge Destination                               Attending Provider: Albert Templeton MD    Allergies: Contrast Dye, Ibuprofen, Prochlorperazine    Isolation: None   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)   Code Status: CPR    Ht: 157.5 cm (62.01\")   Wt: 57.4 kg (126 lb 8.7 oz)    Admission Cmt: None   Principal Problem: Witnessed seizure-like activity (HCC) [R56.9]                 Active Insurance as of 12/4/2022     Primary Coverage     Payor Plan Insurance Group Employer/Plan Group    ANTH MEDICARE REPLACEMENT ANTH MEDICARE ADVANTAGE [id number]     Payor Plan Address Payor Plan Phone Number Payor Plan Fax Number Effective Dates    [address] [phone]  1/1/2019 - None Entered    [facility] [address]       Subscriber Name Subscriber Birth Date Member ID       ZAINA MOTTA 1965 [id number]                 Emergency Contacts      (Rel.) Home Phone Work Phone Mobile Phone    JuanFiona (Daughter) [phone] -- [phone]    Marv Motta (Spouse) -- -- [phone]              "

## 2022-12-05 NOTE — CONSULTS
[facility] Consult Note      Patient Name: Zaina Martinez  : 1965  MRN: [id number]  Primary Care Physician:  Jane Kyle NP  Referring Physician: Eddie Givens MD  Date of admission: 2022    Subjective     Reason for Consult:   MAYUR     HPI:   Zaina Martinez is a 57 y.o. female known to have history of end-stage renal disease on maintenance hemodialysis on Monday, Wednesday, Friday at [facility] under the care of Dr. Vazquez Villalpando, history of hypertension, history of atrial fibrillation, and type 2 diabetes mellitus who presented to the hospital today with seizure and AMS.   Patient last dialyzed on Friday with her schedule.  Denies any nausea or vomiting.  Complaining currently of fatigue.  Review of Systems:   14 point review of systems is otherwise negative except for mentioned above on HPI    Personal History     Past Medical History:   Diagnosis Date   • Acute CVA (cerebrovascular accident) (HCC) 2022   • Acute on chronic diastolic CHF (congestive heart failure) ([facility])    • Anemia    • CAD (coronary artery disease) 2021   • Diabetes (Formerly [facility])    • Disease of thyroid gland    • GERD (gastroesophageal reflux disease)    • History of intracranial hemorrhage 2022   • Hyperlipidemia 2018   • Hypertension    • Rheumatoid arthritis (HCC)    • Stage 5 chronic kidney disease (HCC)        Past Surgical History:   Procedure Laterality Date   • CHOLECYSTECTOMY WITH INTRAOPERATIVE CHOLANGIOGRAM N/A 1/10/2022    Procedure: Laparoscopic cholecystectomy with intraoperative cholangiogram;  Surgeon: Ramana Raygoza MD;  Location: [facility];  Service: General;  Laterality: N/A;   • CYSTOSCOPY W/ URETERAL STENT PLACEMENT Left 2022    Procedure: CYSTOSCOPY URETERAL STENT INSERTION WITH RETROGRADE PYELOGRAM;  Surgeon: Bryant Phan Jr., MD;  Location: [facility] OR;  Service: Urology;  Laterality: Left;   • EYE SURGERY     • FEMUR IM NAILING/RODDING Right  11/10/2022    Procedure: FEMUR INTRAMEDULLARY NAILING/RODDING;  Surgeon: Glen Pierce MD;  Location: [facility] OR;  Service: Orthopedics;  Laterality: Right;   • HIP INTERTROCHANTERIC NAILING Right 11/10/2022    Procedure: HIP INTERTROCHANTERIC NAILING;  Surgeon: Glen Pierce MD;  Location: [facility] OR;  Service: Orthopedics;  Laterality: Right;   • HYSTERECTOMY     • INSERTION HEMODIALYSIS CATHETER N/A 12/6/2021    Procedure: HEMODIALYSIS CATHETER INSERTION;  Surgeon: Keli Salazar MD;  Location: [facility] OR 18/19;  Service: Vascular;  Laterality: N/A;   • INSERTION HEMODIALYSIS CATHETER N/A 5/3/2022    Procedure: TUNNELED CATHETER PLACEMENT;  Surgeon: Keli Salazar MD;  Location: [facility] OR;  Service: Vascular;  Laterality: N/A;   • MUSCLE BIOPSY Left 6/15/2022    Procedure: Left quadriceps muscle biopsy;  Surgeon: Marques Ma MD;  Location: [facility] OR;  Service: Neurosurgery;  Laterality: Left;       Family History: family history is not on file.    Social History:  reports that she has never smoked. She has never used smokeless tobacco. She reports that she does not drink alcohol and does not use drugs.    Home Medications:  Prior to Admission medications    Medication Sig Start Date End Date Taking? Authorizing Provider   amLODIPine (NORVASC) 5 MG tablet Take 1 tablet by mouth Daily. 11/25/22  Yes Mat Marte MD   apixaban (ELIQUIS) 2.5 MG tablet tablet Take 1 tablet by mouth Every 12 (Twelve) Hours. Indications: Atrial Fibrillation 11/24/22  Yes Mat Marte MD   carvedilol (COREG) 25 MG tablet Take 1 tablet by mouth 2 (Two) Times a Day With Meals. 10/6/22  Yes Jimmy Armenta DO   folic acid (FOLVITE) 1 MG tablet Take 1 mg by mouth Daily.   Yes Provider, MD Doyle   insulin lispro (ADMELOG) 100 UNIT/ML injection Inject 0-7 Units under the skin into the appropriate area as directed 3 (Three) Times a Day Before Meals. 11/24/22  Yes  Mat Marte MD   lactulose (CHRONULAC) 10 GM/15ML solution Take 15 mL by mouth 2 (Two) Times a Day. 11/24/22  Yes Mat Marte MD   levothyroxine (SYNTHROID, LEVOTHROID) 25 MCG tablet Take 11 tablets by mouth Every Morning. 11/25/22  Yes Mat Marte MD   melatonin 3 MG tablet Take 1 tablet by mouth Every Night. 10/6/22  Yes Jimmy Armenta,    miconazole (MICOTIN) 2 % cream Apply 1 application topically to the appropriate area as directed Every 12 (Twelve) Hours. 7/22/22  Yes Adonis Florentino MD   oxyCODONE-acetaminophen (PERCOCET) 7.5-325 MG per tablet Take 1 tablet by mouth Every 4 (Four) Hours As Needed for Moderate Pain or Severe Pain. 11/24/22  Yes Mat Marte MD   pantoprazole (PROTONIX) 40 MG EC tablet Take 1 tablet by mouth Daily. 7/1/22  Yes Adonis Florentino MD   polyethylene glycol (MIRALAX) 17 g packet Take 17 g by mouth Daily As Needed (constipation). 7/22/22  Yes Adonis Florentino MD   rOPINIRole (REQUIP) 1 MG tablet Take 2 tablets by mouth Every Night for 30 days. Take 1 hour before bedtime. 11/24/22 12/24/22 Yes Mat Marte MD   sennosides-docusate (PERICOLACE) 8.6-50 MG per tablet Take 2 tablets by mouth Every Night. 11/24/22  Yes Mat Marte MD   sertraline (ZOLOFT) 50 MG tablet Take 3 tablets by mouth Daily. 7/2/22  Yes Adonis Florentino MD       Allergies:  Allergies   Allergen Reactions   • Contrast Dye Confusion   • Ibuprofen Other (See Comments)     Kidney disease   • Prochlorperazine Other (See Comments)     Dystonic reaction            Objective     Vitals:   Temp:  [97.7 °F (36.5 °C)-98.6 °F (37 °C)] 97.7 °F (36.5 °C)  Heart Rate:  [55-68] 66  Resp:  [20-22] 20  BP: (166-184)/() 177/84  Flow (L/min):  [2] 2  No intake or output data in the 24 hours ending 12/05/22 0956    Physical Exam:   Constitutional: Awake, alert, no acute distress.  HEENT: Sclera anicteric, no conjunctival injection  Neck: Supple, no thyromegaly, no  lymphadenopathy, trachea at midline, no JVD  Respiratory: Clear to auscultation bilaterally, nonlabored respiration  Cardiovascular: RRR, no murmurs, no rubs or gallops, no carotid bruit  Gastrointestinal: Positive bowel sounds, abdomen is soft, nontender and nondistended  : No palpable bladder  Musculoskeletal: No edema, no clubbing or cyanosis  Psychiatric: Appropriate affect, cooperative  Neurologic: Oriented x3, moving all extremities, normal speech and mental status  Skin: Warm and dry       Scheduled Meds:     amLODIPine, 5 mg, Oral, Q24H  apixaban, 2.5 mg, Oral, Q12H  carvedilol, 25 mg, Oral, BID With Meals  folic acid, 1 mg, Oral, Daily  insulin lispro, 0-14 Units, Subcutaneous, TID With Meals  lactulose, 10 g, Oral, BID  levETIRAcetam, 500 mg, Intravenous, Q12H  levothyroxine, 275 mcg, Oral, Q AM  melatonin, 3 mg, Oral, Nightly  miconazole, 1 application, Topical, Q12H  pantoprazole, 40 mg, Oral, Daily  rOPINIRole, 2 mg, Oral, Nightly  sennosides-docusate, 2 tablet, Oral, Nightly  sertraline, 150 mg, Oral, Daily  sodium chloride, 10 mL, Intravenous, Q12H      IV Meds:        Results Reviewed:   I have personally reviewed the results from the time of this admission to 12/5/2022 09:56 EST     Lab Results   Component Value Date    GLUCOSE 168 (H) 12/05/2022    CALCIUM 7.9 (L) 12/05/2022     (L) 12/05/2022    K 5.1 12/05/2022    CO2 29.0 12/05/2022    CL 94 (L) 12/05/2022    BUN 44 (H) 12/05/2022    CREATININE 2.96 (H) 12/05/2022    EGFRIFAFRI  01/13/2022      Comment:      <15 Indicative of kidney failure.    EGFRIFNONA 14 (L) 02/28/2022    BCR 14.9 12/05/2022    ANIONGAP 9.0 12/05/2022      Lab Results   Component Value Date    MG 1.9 12/04/2022    PHOS 3.7 11/23/2022    ALBUMIN 3.50 12/04/2022           Assessment / Plan     ASSESSMENT:  1.  End-stage renal disease on maintenance hemodialysis on Monday Wednesday Friday under care of Dr. Vazquez Villalpando at [facility].  2. Hypertension with CKD:  Blood pressures controlled.  3. Hyperkalemia secondary to end-stage renal disease  4. History of A. fib with RVR on Eliquis.  5. Type 2 diabetes mellitus with CKD, blood sugars well controlled.  6. Seizure disorder  7. Right intratrochanteric hip fracture status postrepair  8. Anemia of CKD           PLAN:  • We will dialyze today per schedule and attempt to UF as tolerated  • Continue surveillance labs  • Dose medication for incisional disease  • Check iron panel        Thank you for involving us in the care of Zaina ANTHONY Juan.  Please feel free to call with any questions.    Reynaldo Sotelo MD  12/05/22  09:56 [facility]    [facility]  [phone]    Parts of this note may be an electronic transcription/translation of spoken language to printed text using the Dragon dictation system.

## 2022-12-05 NOTE — CONSULTS
I called the patient and lvm to inform her that medication needs a PA. RX was sent but requires PA. Will start process and hopefully get it taken care before she leaves out of town.    Neurology Consult Note    Referring Provider: Dr. Templeton  Reason for Consultation: seizure    History of present illness:    The patient is 57 year old woman with history of ESRD on hemodialysis who was witnessed by her  to have seizure like activity at home.  She was confused and lethargic on arrival to ED but cleared rapidly after arrival. Head CT did not show any acute changes.     She has never had a sizure in the past. She reports history of stroke within the last year.    She does not use benzodiazepines or alcohol.    Blood pressure on arrival 180/92, no fever.  Glucose 308.    She has had no seizures since arrival.  She was started on Keppra 500 mg IV q 12 h.    Review of neurology notes on chart:   12/2021 altered mental status  and changes on MRI consistent with PRES.   4/2022 suspected right MCA stroke treated with TNK and subsequent left frontal ICH, recovered well after rehab, no right MCA  completed stroke   6/2022 developed LLE acute myopathy while at rehab. EMG done. Muscle biopsy showed no evidence for myopathy or myositis; symptoms felt due to statin that was stopped. Steroids were stopped.   11/2022 consulted for new onset atrial fibrillation and recommendations regarding anti-coagulation. Due to low Hgb, did not recommend AC at that time.    She has subsequently been started on Eliquis.    Past Medical History  Past Medical History:   Diagnosis Date   • Acute CVA (cerebrovascular accident) ([facility]) 05/01/2022   • Acute on chronic diastolic CHF (congestive heart failure) ([facility])    • Anemia    • CAD (coronary artery disease) 12/20/2021   • Diabetes ([facility])    • Disease of thyroid gland    • GERD (gastroesophageal reflux disease)    • History of intracranial hemorrhage 5/1/2022   • Hyperlipidemia 11/30/2018   • Hypertension    • Rheumatoid arthritis ([facility])    • Stage 5 chronic kidney disease ([facility])        Past Surgical History  Past Surgical History:   Procedure Laterality Date   • CHOLECYSTECTOMY  WITH INTRAOPERATIVE CHOLANGIOGRAM N/A 1/10/2022    Procedure: Laparoscopic cholecystectomy with intraoperative cholangiogram;  Surgeon: Ramana Raygoza MD;  Location: [facility];  Service: General;  Laterality: N/A;   • CYSTOSCOPY W/ URETERAL STENT PLACEMENT Left 9/29/2022    Procedure: CYSTOSCOPY URETERAL STENT INSERTION WITH RETROGRADE PYELOGRAM;  Surgeon: Bryant Phan Jr., MD;  Location: [facility];  Service: Urology;  Laterality: Left;   • EYE SURGERY     • FEMUR IM NAILING/RODDING Right 11/10/2022    Procedure: FEMUR INTRAMEDULLARY NAILING/RODDING;  Surgeon: Glen Pierce MD;  Location: [facility] OR;  Service: Orthopedics;  Laterality: Right;   • HIP INTERTROCHANTERIC NAILING Right 11/10/2022    Procedure: HIP INTERTROCHANTERIC NAILING;  Surgeon: Glen Pierce MD;  Location: [facility];  Service: Orthopedics;  Laterality: Right;   • HYSTERECTOMY     • INSERTION HEMODIALYSIS CATHETER N/A 12/6/2021    Procedure: HEMODIALYSIS CATHETER INSERTION;  Surgeon: Keli Salazar MD;  Location: [facility] 18/19;  Service: Vascular;  Laterality: N/A;   • INSERTION HEMODIALYSIS CATHETER N/A 5/3/2022    Procedure: TUNNELED CATHETER PLACEMENT;  Surgeon: Keli Salazar MD;  Location: [facility];  Service: Vascular;  Laterality: N/A;   • MUSCLE BIOPSY Left 6/15/2022    Procedure: Left quadriceps muscle biopsy;  Surgeon: Marques Ma MD;  Location: [facility];  Service: Neurosurgery;  Laterality: Left;       Family History  Family History   Problem Relation Age of Onset   • Malig Hyperthermia Neg Hx        Allergies   Allergen Reactions   • Contrast Dye Confusion   • Ibuprofen Other (See Comments)     Kidney disease   • Prochlorperazine Other (See Comments)     Dystonic reaction          Social History  Lives with ,,no alcohol or tobacco use  Review of Systems   Constitutional: Negative for fever.   HENT: Negative.    Eyes: Negative.    Respiratory:  Negative.    Cardiovascular: Negative.    Gastrointestinal: Negative.    Endocrine: Negative.    Musculoskeletal: Negative.    Neurological:        Single seizure   Psychiatric/Behavioral: Negative.      Medications  Scheduled Meds:amLODIPine, 5 mg, Oral, Q24H  apixaban, 2.5 mg, Oral, Q12H  carvedilol, 25 mg, Oral, BID With Meals  folic acid, 1 mg, Oral, Daily  insulin lispro, 0-14 Units, Subcutaneous, TID With Meals  lactulose, 10 g, Oral, BID  levETIRAcetam, 500 mg, Intravenous, Q12H  levothyroxine, 275 mcg, Oral, Q AM  melatonin, 3 mg, Oral, Nightly  miconazole, 1 application, Topical, Q12H  pantoprazole, 40 mg, Oral, Daily  rOPINIRole, 2 mg, Oral, Nightly  sennosides-docusate, 2 tablet, Oral, Nightly  sertraline, 150 mg, Oral, Daily  sodium chloride, 10 mL, Intravenous, Q12H      Continuous Infusions:   PRN Meds:.•  dextrose  •  dextrose  •  glucagon (human recombinant)  •  LORazepam  •  nitroglycerin  •  oxyCODONE-acetaminophen  •  polyethylene glycol  •  sodium chloride  •  sodium chloride  •  sodium chloride    Vital Signs   Temp:  [97.7 °F (36.5 °C)-98.6 °F (37 °C)] 97.7 °F (36.5 °C)  Heart Rate:  [55-68] 66  Resp:  [20-22] 20  BP: (166-184)/() 177/84    Examination:  Constitutional: Well-groomed, well-nourished  HENT:  normal  Eyes: Normal conjunctivae  CVS:  Regular rate and rhythm.  No murmurs.  Good peripheral perfusion.   Resp :   Non labored respirations  Musculoskeletal: Neck supple, no meningismus  Skin:  No rash, normal turgor  Neurologic:    Alert oriented and fluent  No dysarthria  EOMF without nystagmus  Pupils symmetric and equally reactive  Face symmetric  Normal power in all extremities proximally and distally  Normal coordination  Reflexes symmetric and not hyperactive  Plantar responses flexor  Sensory exam grossly intact  Gait not tested  Psychiatric: No anxiety, normal mood    Results Review:  Results from last 7 days   Lab Units 12/05/22  0533 12/04/22  1942   WBC 10*3/mm3 7.75 9.81    HEMOGLOBIN g/dL 7.3* 8.2*   HEMATOCRIT % 23.7* 25.7*   PLATELETS 10*3/mm3 145 180        Results from last 7 days   Lab Units 12/05/22  0533 12/04/22 1942   SODIUM mmol/L 132* 129*   POTASSIUM mmol/L 5.1 5.7*   CHLORIDE mmol/L 94* 89*   CO2 mmol/L 29.0 28.2   BUN mg/dL 44* 36*   CREATININE mg/dL 2.96* 3.08*   CALCIUM mg/dL 7.9* 8.4*   BILIRUBIN mg/dL  --  0.3   ALK PHOS U/L  --  211*   ALT (SGPT) U/L  --  <5   AST (SGOT) U/L  --  15   GLUCOSE mg/dL 168* 308*      Lab Results   Component Value Date    [id number] 545 06/03/2022     Lab Results   Component Value Date    TSH 57.700 (H) 12/04/2022     Magnesium 1.9      Radiology  Head CT shows no acute pathology with chronic hypodensity in left frontal region   Images reviewed independently    Medical Decision Making and Recommendations  New onset seizure  Single occurrence  No evidence for CNS infection by exam  Likely secondary to hyperglycemia superimposed on prior left frontal stroke/hemorrhage    Stop Keppra  EEG  If EEG shows any persisitent epileptiform activity, would use Depakote rather that Keppra in this patient on hemodialysis as depkote is cleared less by HD.    Paroxysmal afib  On anticoagulation for stroke prevention    I discussed these recommendations with patient        Nancy Gnozalez MD  12/05/22  12:30 EST

## 2022-12-05 NOTE — ED NOTES
Pt arrived by EMS from home family witness seizure, pt was postictal on arrival with EMS. No hx of seizure.  reports that x3 mins of shaking, and unresponsive x15 mins with EMS pt is AOX3 at this.     Patient was placed in face mask during first look triage.  Patient was wearing a face mask throughout encounter.  I wore personal protective equipment throughout the encounter.  Hand hygiene was performed before and after patient encounter.

## 2022-12-06 LAB
ALBUMIN SERPL-MCNC: 2.7 G/DL (ref 3.5–5.2)
ANION GAP SERPL CALCULATED.3IONS-SCNC: 11 MMOL/L (ref 5–15)
BASOPHILS # BLD AUTO: 0.03 10*3/MM3 (ref 0–0.2)
BASOPHILS NFR BLD AUTO: 0.4 % (ref 0–1.5)
BUN SERPL-MCNC: 54 MG/DL (ref 6–20)
BUN/CREAT SERPL: 14.6 (ref 7–25)
CALCIUM SPEC-SCNC: 8.2 MG/DL (ref 8.6–10.5)
CHLORIDE SERPL-SCNC: 88 MMOL/L (ref 98–107)
CO2 SERPL-SCNC: 24 MMOL/L (ref 22–29)
CREAT SERPL-MCNC: 3.7 MG/DL (ref 0.57–1)
DEPRECATED RDW RBC AUTO: 49.8 FL (ref 37–54)
EGFRCR SERPLBLD CKD-EPI 2021: 13.7 ML/MIN/1.73
EOSINOPHIL # BLD AUTO: 0.14 10*3/MM3 (ref 0–0.4)
EOSINOPHIL NFR BLD AUTO: 1.7 % (ref 0.3–6.2)
ERYTHROCYTE [DISTWIDTH] IN BLOOD BY AUTOMATED COUNT: 16.4 % (ref 12.3–15.4)
FERRITIN SERPL-MCNC: 550 NG/ML (ref 13–150)
GLUCOSE BLDC GLUCOMTR-MCNC: 155 MG/DL (ref 70–130)
GLUCOSE BLDC GLUCOMTR-MCNC: 199 MG/DL (ref 70–130)
GLUCOSE BLDC GLUCOMTR-MCNC: 98 MG/DL (ref 70–130)
GLUCOSE SERPL-MCNC: 184 MG/DL (ref 65–99)
HBV SURFACE AG SERPL QL IA: NORMAL
HCT VFR BLD AUTO: 22.5 % (ref 34–46.6)
HGB BLD-MCNC: 7.4 G/DL (ref 12–15.9)
IMM GRANULOCYTES # BLD AUTO: 0.04 10*3/MM3 (ref 0–0.05)
IMM GRANULOCYTES NFR BLD AUTO: 0.5 % (ref 0–0.5)
IRON 24H UR-MRATE: 41 MCG/DL (ref 37–145)
IRON SATN MFR SERPL: 19 % (ref 20–50)
LYMPHOCYTES # BLD AUTO: 1.58 10*3/MM3 (ref 0.7–3.1)
LYMPHOCYTES NFR BLD AUTO: 19 % (ref 19.6–45.3)
MCH RBC QN AUTO: 27.8 PG (ref 26.6–33)
MCHC RBC AUTO-ENTMCNC: 32.9 G/DL (ref 31.5–35.7)
MCV RBC AUTO: 84.6 FL (ref 79–97)
MONOCYTES # BLD AUTO: 0.57 10*3/MM3 (ref 0.1–0.9)
MONOCYTES NFR BLD AUTO: 6.8 % (ref 5–12)
NEUTROPHILS NFR BLD AUTO: 5.97 10*3/MM3 (ref 1.7–7)
NEUTROPHILS NFR BLD AUTO: 71.6 % (ref 42.7–76)
NRBC BLD AUTO-RTO: 0 /100 WBC (ref 0–0.2)
PHOSPHATE SERPL-MCNC: 3.5 MG/DL (ref 2.5–4.5)
PLATELET # BLD AUTO: 164 10*3/MM3 (ref 140–450)
PMV BLD AUTO: 10.4 FL (ref 6–12)
POTASSIUM SERPL-SCNC: 5.8 MMOL/L (ref 3.5–5.2)
RBC # BLD AUTO: 2.66 10*6/MM3 (ref 3.77–5.28)
SODIUM SERPL-SCNC: 123 MMOL/L (ref 136–145)
T3FREE SERPL-MCNC: 1.25 PG/ML (ref 2–4.4)
T4 FREE SERPL-MCNC: 0.96 NG/DL (ref 0.93–1.7)
TIBC SERPL-MCNC: 221 MCG/DL (ref 298–536)
TRANSFERRIN SERPL-MCNC: 148 MG/DL (ref 200–360)
WBC NRBC COR # BLD: 8.33 10*3/MM3 (ref 3.4–10.8)

## 2022-12-06 PROCEDURE — 80069 RENAL FUNCTION PANEL: CPT | Performed by: HOSPITALIST

## 2022-12-06 PROCEDURE — 85025 COMPLETE CBC W/AUTO DIFF WBC: CPT | Performed by: NURSE PRACTITIONER

## 2022-12-06 PROCEDURE — 97162 PT EVAL MOD COMPLEX 30 MIN: CPT

## 2022-12-06 PROCEDURE — 25010000002 NA FERRIC GLUC CPLX PER 12.5 MG: Performed by: HOSPITALIST

## 2022-12-06 PROCEDURE — 82728 ASSAY OF FERRITIN: CPT | Performed by: HOSPITALIST

## 2022-12-06 PROCEDURE — 84439 ASSAY OF FREE THYROXINE: CPT | Performed by: STUDENT IN AN ORGANIZED HEALTH CARE EDUCATION/TRAINING PROGRAM

## 2022-12-06 PROCEDURE — 82962 GLUCOSE BLOOD TEST: CPT

## 2022-12-06 PROCEDURE — 84466 ASSAY OF TRANSFERRIN: CPT | Performed by: HOSPITALIST

## 2022-12-06 PROCEDURE — 84481 FREE ASSAY (FT-3): CPT | Performed by: STUDENT IN AN ORGANIZED HEALTH CARE EDUCATION/TRAINING PROGRAM

## 2022-12-06 PROCEDURE — 87340 HEPATITIS B SURFACE AG IA: CPT | Performed by: HOSPITALIST

## 2022-12-06 PROCEDURE — 83540 ASSAY OF IRON: CPT | Performed by: HOSPITALIST

## 2022-12-06 PROCEDURE — 63710000001 INSULIN LISPRO (HUMAN) PER 5 UNITS: Performed by: NURSE PRACTITIONER

## 2022-12-06 PROCEDURE — 97110 THERAPEUTIC EXERCISES: CPT

## 2022-12-06 RX ADMIN — AMLODIPINE BESYLATE 5 MG: 5 TABLET ORAL at 08:48

## 2022-12-06 RX ADMIN — INSULIN LISPRO 3 UNITS: 100 INJECTION, SOLUTION INTRAVENOUS; SUBCUTANEOUS at 08:48

## 2022-12-06 RX ADMIN — INSULIN GLARGINE-YFGN 5 UNITS: 100 INJECTION, SOLUTION SUBCUTANEOUS at 21:09

## 2022-12-06 RX ADMIN — CARVEDILOL 25 MG: 25 TABLET, FILM COATED ORAL at 17:54

## 2022-12-06 RX ADMIN — APIXABAN 2.5 MG: 2.5 TABLET, FILM COATED ORAL at 21:09

## 2022-12-06 RX ADMIN — SERTRALINE 150 MG: 100 TABLET, FILM COATED ORAL at 08:48

## 2022-12-06 RX ADMIN — OXYCODONE HYDROCHLORIDE AND ACETAMINOPHEN 1 TABLET: 7.5; 325 TABLET ORAL at 08:53

## 2022-12-06 RX ADMIN — SODIUM CHLORIDE 125 MG: 9 INJECTION, SOLUTION INTRAVENOUS at 17:54

## 2022-12-06 RX ADMIN — FOLIC ACID 1 MG: 1 TABLET ORAL at 08:48

## 2022-12-06 RX ADMIN — OXYCODONE HYDROCHLORIDE AND ACETAMINOPHEN 1 TABLET: 7.5; 325 TABLET ORAL at 17:54

## 2022-12-06 RX ADMIN — Medication 3 MG: at 21:09

## 2022-12-06 RX ADMIN — PANTOPRAZOLE SODIUM 40 MG: 40 TABLET, DELAYED RELEASE ORAL at 08:49

## 2022-12-06 RX ADMIN — LEVOTHYROXINE SODIUM 275 MCG: 0.17 TABLET ORAL at 06:17

## 2022-12-06 RX ADMIN — ROPINIROLE 2 MG: 2 TABLET, FILM COATED ORAL at 21:09

## 2022-12-06 RX ADMIN — Medication 10 ML: at 21:09

## 2022-12-06 NOTE — CONSULTS
12-6-22  Urology  57 years old  ESRD  On dialysis  Has ureteral stent in place for hydro  Apparent stricture  Plan was ureteroscopy for further evaluation  She  Had recent hip fracture and surgery and then admitted w seizure   Ultimate plan would be the cysto, ureteroscopy, stent replacement when medically feasible

## 2022-12-06 NOTE — THERAPY EVALUATION
Patient Name: Zaina Martinez  : 1965    MRN: [id number]                              Today's Date: 2022       Admit Date: 2022    Visit Dx:     ICD-10-CM ICD-9-CM   1. Seizure ([facility])  R56.9 780.39   2. Hyponatremia  E87.1 276.1   3. Hyperkalemia  E87.5 276.7   4. Abnormal chest x-ray  R93.89 793.2     Patient Active Problem List   Diagnosis   • Renal insufficiency   • Hypertensive disorder   • Hypothyroidism   • Type 2 diabetes mellitus with kidney complication, with long-term current use of insulin ([facility])   • Rheumatoid arthritis ([facility])   • Angioedema   • Esophageal dysmotility   • Anemia   • Medically noncompliant   • Myocardial infarction due to demand ischemia ([facility])   • Enteritis   • PRES (posterior reversible encephalopathy syndrome)   • Urine retention   • Klebsiella infection   • Superficial thrombophlebitis   • Generalized weakness   • ESRD (end stage renal disease) ([facility])   • CAD (coronary artery disease)   • Abnormal urinalysis   • Chronic diastolic CHF (congestive heart failure) ([facility])   • Pyelonephritis   • Calculus of gallbladder with acute on chronic cholecystitis without obstruction   • Pleural effusion on right   • Anemia due to chronic kidney disease, on chronic dialysis ([facility])   • Abnormal findings on diagnostic imaging of other specified body structures   • Acute upper respiratory infection   • Agitation   • Alkaline phosphatase raised   • Casts present in urine   • Cellulitis of toe   • Hip pain   • Community acquired pneumonia   • Depressive disorder   • Diarrhea of presumed infectious origin   • Difficult or painful urination   • Disease due to severe acute respiratory syndrome coronavirus 2 (SARS-CoV-2)   • Dyspnea   • Encounter for follow-up examination after completed treatment for conditions other than malignant neoplasm   • H/O: hypothyroidism   • Hyperlipidemia   • Hypomagnesemia   • Intractable vomiting with nausea   • Luetscher's syndrome   • Need for influenza vaccination   •  Restless legs   • Noncompliance with treatment   • Shoulder pain   • Acute UTI (urinary tract infection)   • Metabolic encephalopathy   • Abnormal findings on diagnostic imaging of abdomen   • Status post cholecystectomy   • Hyponatremia   • Leukocytosis   • Acute metabolic encephalopathy   • Encephalopathy, toxic   • Acute CVA (cerebrovascular accident) ([facility])   • History of intracranial hemorrhage   • Stroke ([facility])   • Abnormal urinalysis   • Diabetic muscle infarction ([facility])   • Other insomnia   • Altered mental status   • History of stroke- R MCA s/p TPA with subsequent ICH with debility   • Heart murmur   • Bradycardia   • Left lower lobe pneumonia   • Metabolic encephalopathy   • Pericardial effusion   • Pleural effusion   • Acute pulmonary edema ([facility])   • Atrial flutter (HCC)   • Chronic systolic CHF (congestive heart failure) ([facility])   • Thrombus of venous dialysis catheter ([facility]CC)   • Sepsis without acute organ dysfunction (HCC)   • Type 2 diabetes mellitus with hyperglycemia, with long-term current use of insulin (HCC)   • Acute respiratory failure with hypoxia ([facility]CC)   • Acute on chronic systolic CHF (congestive heart failure) ([facility])   • Hyperkalemia   • Acute respiratory failure with hypoxia (HCC)   • Other hypervolemia   • Hematoma of right hip, initial encounter   • Ureteral stent present   • Closed intertrochanteric fracture of right femur ([facility]CC)   • Witnessed seizure-like activity ([facility])     Past Medical History:   Diagnosis Date   • Acute CVA (cerebrovascular accident) ([facility]) 05/01/2022   • Acute on chronic diastolic CHF (congestive heart failure) ([facility])    • Anemia    • CAD (coronary artery disease) 12/20/2021   • Diabetes ([facility])    • Disease of thyroid gland    • GERD (gastroesophageal reflux disease)    • History of intracranial hemorrhage 5/1/2022   • Hyperlipidemia 11/30/2018   • Hypertension    • Rheumatoid arthritis ([facility]CC)    • Stage 5 chronic kidney disease ([facility])      Past Surgical History:   Procedure  Laterality Date   • CHOLECYSTECTOMY WITH INTRAOPERATIVE CHOLANGIOGRAM N/A 1/10/2022    Procedure: Laparoscopic cholecystectomy with intraoperative cholangiogram;  Surgeon: Ramana Raygoza MD;  Location: [facility];  Service: General;  Laterality: N/A;   • CYSTOSCOPY W/ URETERAL STENT PLACEMENT Left 9/29/2022    Procedure: CYSTOSCOPY URETERAL STENT INSERTION WITH RETROGRADE PYELOGRAM;  Surgeon: Bryant Phan Jr., MD;  Location: [facility];  Service: Urology;  Laterality: Left;   • EYE SURGERY     • FEMUR IM NAILING/RODDING Right 11/10/2022    Procedure: FEMUR INTRAMEDULLARY NAILING/RODDING;  Surgeon: Glen Pierce MD;  Location: [facility];  Service: Orthopedics;  Laterality: Right;   • HIP INTERTROCHANTERIC NAILING Right 11/10/2022    Procedure: HIP INTERTROCHANTERIC NAILING;  Surgeon: Glen Pierce MD;  Location: [facility];  Service: Orthopedics;  Laterality: Right;   • HYSTERECTOMY     • INSERTION HEMODIALYSIS CATHETER N/A 12/6/2021    Procedure: HEMODIALYSIS CATHETER INSERTION;  Surgeon: Keli Salazar MD;  Location: [facility] 18/19;  Service: Vascular;  Laterality: N/A;   • INSERTION HEMODIALYSIS CATHETER N/A 5/3/2022    Procedure: TUNNELED CATHETER PLACEMENT;  Surgeon: Keli Salazar MD;  Location: [facility];  Service: Vascular;  Laterality: N/A;   • MUSCLE BIOPSY Left 6/15/2022    Procedure: Left quadriceps muscle biopsy;  Surgeon: Marques Ma MD;  Location: [facility];  Service: Neurosurgery;  Laterality: Left;      General Information     Row Name 12/06/22 1251          Physical Therapy Time and Intention    Document Type evaluation  -EJ     Mode of Treatment physical therapy  -EJ     Row Name 12/06/22 1251          General Information    Patient Profile Reviewed yes  -EJ     Prior Level of Function min assist:;ADL's;all household mobility;w/c or scooter  using walker and WC at home, ambulating short distances,  able to  assist  -EJ     Existing Precautions/Restrictions fall  -EJ     Barriers to Rehab medically complex;previous functional deficit  -EJ     Row Name 12/06/22 1251          Living Environment    People in Home spouse  -EJ     Row Name 12/06/22 1251          Cognition    Orientation Status (Cognition) oriented x 3  -EJ     Row Name 12/06/22 1251          Safety Issues, Functional Mobility    Impairments Affecting Function (Mobility) pain;strength;endurance/activity tolerance  -EJ           User Key  (r) = Recorded By, (t) = Taken By, (c) = Cosigned By    Initials Name Provider Type    EJ Nicol Rios, PT Physical Therapist               Mobility     Row Name 12/06/22 1252          Bed Mobility    Bed Mobility supine-sit;sit-supine  -EJ     Supine-Sit [place] (Bed Mobility) minimum assist (75% patient effort)  -EJ     Sit-Supine [place] (Bed Mobility) verbal cues;contact guard;minimum assist (75% patient effort)  -     Assistive Device (Bed Mobility) head of bed elevated  -     Row Name 12/06/22 1252          Sit-Stand Transfer    Sit-Stand [place] (Transfers) verbal cues;minimum assist (75% patient effort)  -     Assistive Device (Sit-Stand Transfers) walker, front-wheeled  -EJ     Row Name 12/06/22 1252          Gait/Stairs (Locomotion)    [place] Level (Gait) verbal cues;contact guard  -EJ     Assistive Device (Gait) walker, front-wheeled  -EJ     Distance in Feet (Gait) 20  -EJ     Deviations/Abnormal Patterns (Gait) kenn decreased;stride length decreased  -EJ     Comment, (Gait/Stairs) slow pace, some pain in RLE  -EJ           User Key  (r) = Recorded By, (t) = Taken By, (c) = Cosigned By    Initials Name Provider Type     Nicol Rios, PT Physical Therapist               Obj/Interventions     Row Name 12/06/22 1253          Range of Motion Comprehensive    General Range of Motion no range of motion deficits identified  -     Row Name 12/06/22 1253          Strength  Comprehensive (MMT)    Comment, General Manual Muscle Testing (MMT) Assessment generalized weakness, increased RLE weakness 2' recent R femur IM nailing  -EJ           User Key  (r) = Recorded By, (t) = Taken By, (c) = Cosigned By    Initials Name Provider Type    Nicol Terry, PT Physical Therapist               Goals/Plan     Row Name 12/06/22 1300          Bed Mobility Goal 1 (PT)    Activity/Assistive Device (Bed Mobility Goal 1, PT) bed mobility activities, all  -EJ     [place] Level/Cues Needed (Bed Mobility Goal 1, PT) standby assist  -EJ     Time Frame (Bed Mobility Goal 1, PT) 1 week  -EJ     Row Name 12/06/22 1300          Transfer Goal 1 (PT)    Activity/Assistive Device (Transfer Goal 1, PT) transfers, all;walker, rolling  -EJ     [place] Level/Cues Needed (Transfer Goal 1, PT) contact guard required  -EJ     Time Frame (Transfer Goal 1, PT) 1 week  -EJ     Row Name 12/06/22 1300          Gait Training Goal 1 (PT)    Activity/Assistive Device (Gait Training Goal 1, PT) gait (walking locomotion);walker, rolling  -EJ     [place] Level (Gait Training Goal 1, PT) contact guard required  -EJ     Distance (Gait Training Goal 1, PT) 80  -EJ     Time Frame (Gait Training Goal 1, PT) 1 week  -EJ     Row Name 12/06/22 1300          Therapy Assessment/Plan (PT)    Planned Therapy Interventions (PT) balance training;bed mobility training;gait training;home exercise program;patient/family education;stretching;strengthening;stair training;ROM (range of motion);transfer training  -EJ           User Key  (r) = Recorded By, (t) = Taken By, (c) = Cosigned By    Initials Name Provider Type    Nicol Terry, PT Physical Therapist               Clinical Impression     Row Name 12/06/22 1253          Pain    Posttreatment Pain Rating 3/10  -EJ     Pain Location - Side/Orientation Right  -EJ     Pain Location lower  -EJ     Pain Location - extremity  -EJ     Pain Intervention(s)  Repositioned;Ambulation/increased activity  -EJ     Row Name 12/06/22 1253          Plan of Care Review    Plan of Care Reviewed With patient  -EJ     Progress improving  -EJ     Outcome Evaluation Pt agreeable to PT eval this am. She was admitted w seizure and has complicated medical hx. Pt w recent R femur IM nailing. She has hs of CVA, CHF< anemia, CAD, ESPD on dialysis. She lives at home w her  and uses walker or WC for mobility. Her  does assist w mobility and ADLs at times. Today, pt reports fatigue and some mild RLE pain. She was able to perform bed mobility w min A. She then stood w min A and then ambulated approx 20 ft in room w Rwx. She exhibits slow pace and is limited by fatigue, but no LOB noted. Pt plans to return home at [place], when appropriate. Will continue to follow for skilled PT to maximize safety and independence.  -EJ     Row Name 12/06/22 1253          Therapy Assessment/Plan (PT)    Rehab Potential (PT) good, to achieve stated therapy goals  -EJ     Criteria for Skilled Interventions Met (PT) yes  -EJ     Therapy Frequency (PT) 5 times/wk  -EJ     Row Name 12/06/22 1253          Positioning and Restraints    Pre-Treatment Position in bed  -EJ     Post Treatment Position bed  -EJ     In Bed notified nsg;supine;call light within reach;encouraged to call for assist;exit alarm on  -EJ           User Key  (r) = Recorded By, (t) = Taken By, (c) = Cosigned By    Initials Name Provider Type    Nicol Terry, PT Physical Therapist               Outcome Measures     Row Name 12/06/22 1301 12/06/22 1100       How much help from another person do you currently need...    Turning from your back to your side while in flat bed without using bedrails? 4  -EJ 4  -BH    Moving from lying on back to sitting on the side of a flat bed without bedrails? 3  -EJ 4  -BH    Moving to and from a bed to a chair (including a wheelchair)? 3  -EJ 3  -BH    Standing up from a chair using your arms (e.g.,  wheelchair, bedside chair)? 3  -EJ 3  -BH    Climbing 3-5 steps with a railing? 3  -EJ 3  -BH    To walk in hospital room? 3  -EJ 3  -BH    AM-PAC 6 Clicks Score (PT) 19  -EJ 20  -BH    Highest level of mobility 6 --> Walked 10 steps or more  - 6 --> Walked 10 steps or more  -          User Key  (r) = Recorded By, (t) = Taken By, (c) = Cosigned By    Initials Name Provider Type     Nicol Rios, PT Physical Therapist     Bipin Fontenot, RN Registered Nurse                             Physical Therapy Education     Title: PT OT SLP Therapies (In Progress)     Topic: Physical Therapy (In Progress)     Point: Mobility training (Done)     Learning Progress Summary           Patient Acceptance, E,TB,D, VU,NR by  at 12/6/2022 1301                   Point: Home exercise program (Not Started)     Learner Progress:  Not documented in this visit.          Point: Body mechanics (Not Started)     Learner Progress:  Not documented in this visit.          Point: Precautions (Not Started)     Learner Progress:  Not documented in this visit.                      User Key     Initials Effective Dates Name Provider Type [facility] 06/16/21 -  Nicol Rios, PT Physical Therapist PT              PT Recommendation and Plan  Planned Therapy Interventions (PT): balance training, bed mobility training, gait training, home exercise program, patient/family education, stretching, strengthening, stair training, ROM (range of motion), transfer training  Plan of Care Reviewed With: patient  Progress: improving  Outcome Evaluation: Pt agreeable to PT eval this am. She was admitted w seizure and has complicated medical hx. Pt w recent R femur IM nailing. She has hs of CVA, CHF< anemia, CAD, ESPD on dialysis. She lives at home w her  and uses walker or WC for mobility. Her  does assist w mobility and ADLs at times. Today, pt reports fatigue and some mild RLE pain. She was able to perform bed mobility w min  A. She then stood w min A and then ambulated approx 20 ft in room w Rwx. She exhibits slow pace and is limited by fatigue, but no LOB noted. Pt plans to return home at [place], when appropriate. Will continue to follow for skilled PT to maximize safety and independence.     Time Calculation:    PT Charges     Row Name 12/06/22 1302             Time Calculation    Start Time 1125  -EJ      Stop Time 1145  -EJ      Time Calculation (min) 20 min  -EJ      PT Received On 12/06/22  -EJ      PT - Next Appointment 12/07/22  -EJ      PT Goal Re-Cert Due Date 12/13/22  -EJ         Time Calculation- PT    Total Timed Code Minutes- PT 15 minute(s)  -EJ            User Key  (r) = Recorded By, (t) = Taken By, (c) = Cosigned By    Initials Name Provider Type    Nicol Terry, PT Physical Therapist              Therapy Charges for Today     Code Description Service Date Service Provider Modifiers Qty    79909931583 HC PT EVAL MOD COMPLEXITY 3 12/6/2022 Nicol Rios, PT GP 1    00876710512 HC PT THER PROC EA 15 MIN 12/6/2022 Nicol Rios, PT GP 1    66382659052 HC PT THER SUPP EA 15 MIN 12/6/2022 Nicol Rios, PT GP 1          PT G-Codes  AM-PAC 6 Clicks Score (PT): 19  PT Discharge Summary  Anticipated Discharge Disposition (PT): home with assist, home with home health    Nicol Rios PT  12/6/2022

## 2022-12-06 NOTE — PLAN OF CARE
Problem: Fall Injury Risk  Goal: Absence of Fall and Fall-Related Injury  Outcome: Ongoing, Progressing  Intervention: Identify and Manage Contributors  Recent Flowsheet Documentation  Taken 12/5/2022 1800 by Bipin Fontenot RN  Medication Review/Management: medications reviewed  Taken 12/5/2022 1654 by Bipin Fontenot RN  Medication Review/Management: medications reviewed  Taken 12/5/2022 1425 by Bipin Fontenot RN  Medication Review/Management: medications reviewed  Taken 12/5/2022 1257 by Bipin Fontenot RN  Medication Review/Management: medications reviewed  Taken 12/5/2022 1020 by Bipin Fontenot RN  Medication Review/Management: medications reviewed  Taken 12/5/2022 0800 by Bipin Fontenot RN  Medication Review/Management: medications reviewed  Intervention: Promote Injury-Free Environment  Recent Flowsheet Documentation  Taken 12/5/2022 1800 by Bipin Fontenot RN  Safety Promotion/Fall Prevention:   activity supervised   assistive device/personal items within reach   clutter free environment maintained   toileting scheduled   safety round/check completed   room organization consistent   nonskid shoes/slippers when out of bed   muscle strengthening facilitated   fall prevention program maintained   gait belt  Taken 12/5/2022 1654 by Bipin Fontenot RN  Safety Promotion/Fall Prevention:   activity supervised   assistive device/personal items within reach   clutter free environment maintained   safety round/check completed   room organization consistent   toileting scheduled   fall prevention program maintained  Taken 12/5/2022 1425 by Bipin Fontenot RN  Safety Promotion/Fall Prevention:   activity supervised   assistive device/personal items within reach   clutter free environment maintained   toileting scheduled   safety round/check completed   room organization consistent   fall prevention program maintained  Taken 12/5/2022 1257 by Bipin Fontenot RN  Safety Promotion/Fall Prevention:   activity  supervised   assistive device/personal items within reach   clutter free environment maintained   toileting scheduled   safety round/check completed   room organization consistent   nonskid shoes/slippers when out of bed   muscle strengthening facilitated   fall prevention program maintained  Taken 12/5/2022 1020 by Bipin Fontenot, RN  Safety Promotion/Fall Prevention:   activity supervised   assistive device/personal items within reach   clutter free environment maintained   toileting scheduled   safety round/check completed   room organization consistent   nonskid shoes/slippers when out of bed   muscle strengthening facilitated   gait belt   fall prevention program maintained  Taken 12/5/2022 0800 by Bipin Fontenot, RN  Safety Promotion/Fall Prevention:   assistive device/personal items within reach   activity supervised   clutter free environment maintained   toileting scheduled   room organization consistent   safety round/check completed   nonskid shoes/slippers when out of bed   fall prevention program maintained     Problem: Seizure, Active Management  Goal: Absence of Seizure/Seizure-Related Injury  Outcome: Ongoing, Progressing   Goal Outcome Evaluation:

## 2022-12-06 NOTE — PLAN OF CARE
Goal Outcome Evaluation:  Plan of Care Reviewed With: patient        Progress: improving  Outcome Evaluation: Pt agreeable to PT eval this am. She was admitted w seizure and has complicated medical hx. Pt w recent R femur IM nailing. She has hs of CVA, CHF< anemia, CAD, ESPD on dialysis. She lives at home w her  and uses walker or WC for mobility. Her  does assist w mobility and ADLs at times. Today, pt reports fatigue and some mild RLE pain. She was able to perform bed mobility w min A. She then stood w min A and then ambulated approx 20 ft in room w Rwx. She exhibits slow pace and is limited by fatigue, but no LOB noted. Pt plans to return home at [place], when appropriate. Will continue to follow for skilled PT to maximize safety and independence.

## 2022-12-06 NOTE — PROGRESS NOTES
[facility] Progress Note      Patient Name: Zaina Martinez  : 1965  MRN: [id number]  Primary Care Physician:  Jane Kyle NP  Date of admission: 2022    Subjective     Interval History:   The patient was seen and examined today for follow-up on ESRD   No new complaints noted today.  Did not dialyze yesterday because of scheduling issues.  Denies any chest pain or shortness of breath.    Objective     Vitals:   Temp:  [97.3 °F (36.3 °C)-98.5 °F (36.9 °C)] 97.7 °F (36.5 °C)  Heart Rate:  [58-68] 60  Resp:  [16-20] 16  BP: (146-173)/() 171/86  Flow (L/min):  [2] 2    Intake/Output Summary (Last 24 hours) at 2022 0957  Last data filed at 2022 1700  Gross per 24 hour   Intake --   Output 150 ml   Net -150 ml       Physical Exam:    General Appearance: alert, oriented x 3, no acute distress   Skin: warm and dry  HEENT: oral mucosa normal, nonicteric sclera  Neck: supple, no JVD  Lungs: CTA  Heart: RRR, normal S1 and S2  Abdomen: soft, nontender, nondistended  : no palpable bladder  Extremities: Trace edema, cyanosis or clubbing  Neuro: normal speech and mental status     Scheduled Meds:     amLODIPine, 5 mg, Oral, Q24H  apixaban, 2.5 mg, Oral, Q12H  carvedilol, 25 mg, Oral, BID With Meals  folic acid, 1 mg, Oral, Daily  insulin glargine, 5 Units, Subcutaneous, Nightly  insulin lispro, 0-14 Units, Subcutaneous, TID With Meals  lactulose, 10 g, Oral, BID  levothyroxine, 275 mcg, Oral, Q AM  melatonin, 3 mg, Oral, Nightly  miconazole, 1 application, Topical, Q12H  pantoprazole, 40 mg, Oral, Daily  rOPINIRole, 2 mg, Oral, Nightly  sennosides-docusate, 2 tablet, Oral, Nightly  sertraline, 150 mg, Oral, Daily  sodium chloride, 10 mL, Intravenous, Q12H      IV Meds:        Results Reviewed:   I have personally reviewed the results from the time of this admission to 2022 09:57 EST     Results from last 7 days   Lab Units 22  0657 22  0533  12/04/22 1942   SODIUM mmol/L 123* 132* 129*   POTASSIUM mmol/L 5.8* 5.1 5.7*   CHLORIDE mmol/L 88* 94* 89*   CO2 mmol/L 24.0 29.0 28.2   BUN mg/dL 54* 44* 36*   CREATININE mg/dL 3.70* 2.96* 3.08*   CALCIUM mg/dL 8.2* 7.9* 8.4*   BILIRUBIN mg/dL  --   --  0.3   ALK PHOS U/L  --   --  211*   ALT (SGPT) U/L  --   --  <5   AST (SGOT) U/L  --   --  15   GLUCOSE mg/dL 184* 168* 308*       Estimated Creatinine Clearance: 15.2 mL/min (A) (by C-G formula based on SCr of 3.7 mg/dL (H)).    Results from last 7 days   Lab Units 12/06/22 0657 12/04/22 1942   MAGNESIUM mg/dL  --  1.9   PHOSPHORUS mg/dL 3.5  --              Results from last 7 days   Lab Units 12/06/22  0657 12/05/22  0533 12/04/22 1942   WBC 10*3/mm3 8.33 7.75 9.81   HEMOGLOBIN g/dL 7.4* 7.3* 8.2*   PLATELETS 10*3/mm3 164 145 180       Results from last 7 days   Lab Units 12/04/22 1942   INR  1.27*       Assessment / Plan     ASSESSMENT:  1. End-stage renal disease on maintenance hemodialysis on Monday Wednesday Friday under care of Dr. Vazquez Villalpando at [facility].  2. Hypertension with CKD: Blood pressures controlled.  3. Hyperkalemia secondary to end-stage renal disease  4. History of A. fib with RVR on Eliquis.  5. Type 2 diabetes mellitus with CKD, blood sugars well controlled.  6. Seizure disorder  7. Right intratrochanteric hip fracture status postrepair  8. Anemia of CKD and Iron deficiency anemia            PLAN:  • Plan to dialyze today per to make up for missed dialysis treatment yesterday.  • We will continue dialysis on Monday Wednesday Friday with tentative plan for dialysis tomorrow also to keep on her dialysis schedule  • Continue surveillance lab  • We will start IV iron          Thank you for involving us in the care of Zaina Martinez.  Please feel free to call with any questions.    Reynaldo Sotelo MD  12/06/22  09:57 EST    [facility]  [phone]    Parts of this note may be an electronic  transcription/translation of spoken language to printed text using the Dragon dictation system.

## 2022-12-06 NOTE — PROGRESS NOTES
Name: Zaina Martinez ADMIT: 2022   : 1965  PCP: Jane Kyle NP    MRN: [id number] LOS: 0 days   AGE/SEX: 57 y.o. female  ROOM: [facility]     Subjective   Subjective   No further seizure activity. No new issues.     Review of Systems   Constitutional: Negative for fever.   HENT: Negative for congestion.    Respiratory: Negative for shortness of breath.    Cardiovascular: Negative for chest pain.   Gastrointestinal: Negative for nausea.   Genitourinary: Negative for dysuria.   Musculoskeletal: Positive for arthralgias.        Postop rt hip pain   Skin: Negative for rash.   Neurological: Negative for seizures and headaches.   Psychiatric/Behavioral: Negative for sleep disturbance.        Objective   Objective   Vital Signs  Temp:  [97.3 °F (36.3 °C)-98.5 °F (36.9 °C)] 97.7 °F (36.5 °C)  Heart Rate:  [58-68] 60  Resp:  [16-20] 16  BP: (146-173)/() 171/86  SpO2:  [90 %-98 %] 98 %  on  Flow (L/min):  [2] 2;   Device (Oxygen Therapy): nasal cannula  Body mass index is 23.14 kg/m².  Physical Exam  Vitals and nursing note reviewed.   Constitutional:       General: She is not in acute distress.     Appearance: She is ill-appearing.   HENT:      Head: Normocephalic.      Mouth/Throat:      Mouth: Mucous membranes are moist.   Eyes:      Conjunctiva/sclera: Conjunctivae normal.   Cardiovascular:      Rate and Rhythm: Normal rate and regular rhythm.   Pulmonary:      Effort: Pulmonary effort is normal. No respiratory distress.      Breath sounds: Examination of the right-lower field reveals decreased breath sounds. Examination of the left-lower field reveals decreased breath sounds. Decreased breath sounds present.   Abdominal:      General: Bowel sounds are normal.      Palpations: Abdomen is soft.   Musculoskeletal:      Cervical back: Neck supple.      Right lower leg: No edema.      Left lower leg: No edema.   Skin:     General: Skin is warm and dry.   Neurological:      Mental Status: She is alert and  oriented to person, place, and time.   Psychiatric:         Mood and Affect: Mood normal.         Behavior: Behavior normal.       Results Review     I reviewed the patient's new clinical results.  Results from last 7 days   Lab Units 12/06/22 0657 12/05/22 0533 12/04/22 1942   WBC 10*3/mm3 8.33 7.75 9.81   HEMOGLOBIN g/dL 7.4* 7.3* 8.2*   PLATELETS 10*3/mm3 164 145 180     Results from last 7 days   Lab Units 12/06/22 0657 12/05/22 0533 12/04/22 1942   SODIUM mmol/L 123* 132* 129*   POTASSIUM mmol/L 5.8* 5.1 5.7*   CHLORIDE mmol/L 88* 94* 89*   CO2 mmol/L 24.0 29.0 28.2   BUN mg/dL 54* 44* 36*   CREATININE mg/dL 3.70* 2.96* 3.08*   GLUCOSE mg/dL 184* 168* 308*   EGFR mL/min/1.73 13.7* 17.9* 17.1*     Results from last 7 days   Lab Units 12/06/22 0657 12/04/22 1942   ALBUMIN g/dL 2.70* 3.50   BILIRUBIN mg/dL  --  0.3   ALK PHOS U/L  --  211*   AST (SGOT) U/L  --  15   ALT (SGPT) U/L  --  <5     Results from last 7 days   Lab Units 12/06/22 0657 12/05/22 0533 12/04/22 1942   CALCIUM mg/dL 8.2* 7.9* 8.4*   ALBUMIN g/dL 2.70*  --  3.50   MAGNESIUM mg/dL  --   --  1.9   PHOSPHORUS mg/dL 3.5  --   --      Results from last 7 days   Lab Units 12/04/22 1942   PROCALCITONIN ng/mL 0.12     Glucose   Date/Time Value Ref Range Status   12/06/2022 1216 155 (H) 70 - 130 mg/dL Final     Comment:     Meter: [id number] : [id number] Relin Quin GÓMEZ   12/06/2022 0829 199 (H) 70 - 130 mg/dL Final     Comment:     Meter: [id number] : [id number] Erlin Quin GÓMEZ   12/05/2022 2104 194 (H) 70 - 130 mg/dL Final     Comment:     Meter: [id number] : [id number] Carrillo Disha JOSÉ MIGUELA   12/05/2022 1610 123 70 - 130 mg/dL Final     Comment:     Meter: [id number] : [id number] Erlin Tsai GÓMEZ   12/05/2022 1201 183 (H) 70 - 130 mg/dL Final     Comment:     Meter: [id number] : [id number] Erlin Tsai GÓMEZ   12/05/2022 0810 187 (H) 70 - 130 mg/dL Final     Comment:     Meter: [id number] : [id number] Erlin Tsai  GÓMEZ       CT Head Without Contrast    Result Date: 12/4/2022  Mild residual linear hypodensity in the left frontal lobe in the region of the patient's prior intraparenchymal hematoma with no acute intracranial hemorrhage identified. Mild small vessel ischemic disease, not significantly changed. Consider further evaluation with MRI.  Discussed with Dr. Nam at 6 10:22 PM.  This report was finalized on 12/4/2022 10:22 PM by Dr. Ananya Desouza M.D.      Scheduled Medications  amLODIPine, 5 mg, Oral, Q24H  apixaban, 2.5 mg, Oral, Q12H  carvedilol, 25 mg, Oral, BID With Meals  ferric gluconate, 125 mg, Intravenous, Once  folic acid, 1 mg, Oral, Daily  insulin glargine, 5 Units, Subcutaneous, Nightly  insulin lispro, 0-14 Units, Subcutaneous, TID With Meals  lactulose, 10 g, Oral, BID  levothyroxine, 275 mcg, Oral, Q AM  melatonin, 3 mg, Oral, Nightly  miconazole, 1 application, Topical, Q12H  pantoprazole, 40 mg, Oral, Daily  rOPINIRole, 2 mg, Oral, Nightly  sennosides-docusate, 2 tablet, Oral, Nightly  sertraline, 150 mg, Oral, Daily  sodium chloride, 10 mL, Intravenous, Q12H    Infusions   Diet  Diet: Regular/House Diet, Diabetic Diets, Renal Diets; Consistent Carbohydrate; Low Potassium, Low Sodium (2-3g); Texture: Regular Texture (IDDSI 7); Fluid Consistency: Thin (IDDSI 0)       Assessment/Plan     Active Hospital Problems    Diagnosis  POA   • **Witnessed seizure-like activity (HCC) [R56.9]  Yes   • Ureteral stent present [Z96.0]  Not Applicable   • Closed intertrochanteric fracture of right femur (HCC) [S72.141A]  Yes   • Hyperkalemia [E87.5]  Yes   • Type 2 diabetes mellitus with hyperglycemia, with long-term current use of insulin (HCC) [E11.65, Z79.4]  Not Applicable   • Atrial flutter (HCC) [I48.92]  Yes   • Thrombus of venous dialysis catheter (HCC) [T82.868A]  Yes   • History of stroke- R MCA s/p TPA with subsequent ICH with debility [Z86.73]  Not Applicable   • History of intracranial hemorrhage [Z86.79]   Not Applicable   • Hyponatremia [E87.1]  Yes   • Anemia due to chronic kidney disease, on chronic dialysis (HCC) [N18.6, D63.1, Z99.2]  Not Applicable   • Chronic diastolic CHF (congestive heart failure) ([facility]) [I50.32]  Yes   • ESRD (end stage renal disease) ([facility]) [N18.6]  Yes   • Hypothyroidism [E03.9]  Yes      Resolved Hospital Problems   No resolved problems to display.       57 y.o. female admitted with Witnessed seizure-like activity ([facility]).    Seizure-like activity/Hx CVA/Hx ICH:  -Appreciate Neurology assistance. CTH without acute ICH. EEG showed no focality or epitleptiform activity. No indication for long term seizure medication. Neurology recommending maximizing management of glucose    ESRD/Hyperkalemia/Hyponatremia/Thrombus of venous dialysis catheter:  -Nephrology following with plan for HD today. Continue to monitor electrolytes. Dialysis MWF    Afib/flutter/CHF:  -HR controlled. Denies chest pain. Elevated troponin due to renal insufficiency. Monitor on telemtry. Continue carvedilol. Diltiazem stopped last admission due to bradycardia. On eliquis    Anemia:   -Due to CKD w/low iron sat. Nephrology planning IV iron replacement. Monitor Hgb    Hypothyroidism:   -Elevated TSH, normal FT4, low FT3. Dose increased last admission to 275 mcg. Continue current dose. Reinforce correct administration of levothyroxine. Will need outpatient Endocrine follow up and repeat TFT's in 4-6 weeks    Ureteral stent:  -In place due to hydronephrosis. Consult Urology to follow    Rt intertrochanteric femur fx:   -S/P IM Nailing by Dr. Pierce 11/10/22. PT eval    DM:  -BG trends reviewed. A1C 10%. Add long acting insulin 5 units hs. Continue correctional scale. Hypoglycemia protocol      · Eliquis (home med) for DVT prophylaxis.  · Full code.  · Discussed with patient and Dr. Templeton.  · Anticipate discharge home with  vs SNU facility later this week.      RIKA William  [facility]  12/06/22  12:44 EST

## 2022-12-06 NOTE — PLAN OF CARE
Problem: Fall Injury Risk  Goal: Absence of Fall and Fall-Related Injury  Intervention: Identify and Manage Contributors  Recent Flowsheet Documentation  Taken 12/6/2022 0200 by Dana Davis RN  Medication Review/Management: medications reviewed  Taken 12/6/2022 0000 by Dana Davis RN  Medication Review/Management: medications reviewed  Taken 12/5/2022 2200 by Dana Davis RN  Medication Review/Management: medications reviewed  Taken 12/5/2022 2000 by Dana Davis RN  Medication Review/Management: medications reviewed  Intervention: Promote Injury-Free Environment  Recent Flowsheet Documentation  Taken 12/6/2022 0200 by Dana Davis RN  Safety Promotion/Fall Prevention:   fall prevention program maintained   safety round/check completed  Taken 12/6/2022 0000 by Dana Davis RN  Safety Promotion/Fall Prevention:   assistive device/personal items within reach   clutter free environment maintained   fall prevention program maintained   room organization consistent   safety round/check completed  Taken 12/5/2022 2200 by Dana Davis RN  Safety Promotion/Fall Prevention:   assistive device/personal items within reach   clutter free environment maintained   fall prevention program maintained   room organization consistent   safety round/check completed  Taken 12/5/2022 2000 by Dana Davis RN  Safety Promotion/Fall Prevention:   activity supervised   assistive device/personal items within reach   clutter free environment maintained   fall prevention program maintained   room organization consistent   safety round/check completed     Problem: Skin Injury Risk Increased  Goal: Skin Health and Integrity  Intervention: Optimize Skin Protection  Recent Flowsheet Documentation  Taken 12/6/2022 0200 by Dana Davis RN  Head of Bed (HOB) Positioning: HOB elevated  Taken 12/6/2022 0000 by Dana Davis RN  Head of Bed (HOB) Positioning: HOB elevated  Taken 12/5/2022 2200 by Dana Davis RN  Head of Bed  (HOB) Positioning: HOB elevated  Taken 12/5/2022 2000 by Dana Davis, RN  Head of Bed (HOB) Positioning: HOB at 20-30 degrees   Goal Outcome Evaluation:

## 2022-12-07 LAB
ALBUMIN SERPL-MCNC: 2.8 G/DL (ref 3.5–5.2)
ANION GAP SERPL CALCULATED.3IONS-SCNC: 7.3 MMOL/L (ref 5–15)
BASOPHILS # BLD AUTO: 0.02 10*3/MM3 (ref 0–0.2)
BASOPHILS NFR BLD AUTO: 0.3 % (ref 0–1.5)
BUN SERPL-MCNC: 26 MG/DL (ref 6–20)
BUN/CREAT SERPL: 11.7 (ref 7–25)
CALCIUM SPEC-SCNC: 8 MG/DL (ref 8.6–10.5)
CHLORIDE SERPL-SCNC: 99 MMOL/L (ref 98–107)
CO2 SERPL-SCNC: 23.7 MMOL/L (ref 22–29)
CREAT SERPL-MCNC: 2.22 MG/DL (ref 0.57–1)
DEPRECATED RDW RBC AUTO: 52.2 FL (ref 37–54)
EGFRCR SERPLBLD CKD-EPI 2021: 25.3 ML/MIN/1.73
EOSINOPHIL # BLD AUTO: 0.08 10*3/MM3 (ref 0–0.4)
EOSINOPHIL NFR BLD AUTO: 1.1 % (ref 0.3–6.2)
ERYTHROCYTE [DISTWIDTH] IN BLOOD BY AUTOMATED COUNT: 17 % (ref 12.3–15.4)
GLUCOSE BLDC GLUCOMTR-MCNC: 106 MG/DL (ref 70–130)
GLUCOSE BLDC GLUCOMTR-MCNC: 137 MG/DL (ref 70–130)
GLUCOSE BLDC GLUCOMTR-MCNC: 145 MG/DL (ref 70–130)
GLUCOSE BLDC GLUCOMTR-MCNC: 205 MG/DL (ref 70–130)
GLUCOSE SERPL-MCNC: 138 MG/DL (ref 65–99)
HBA1C MFR BLD: 8.9 % (ref 4.8–5.6)
HCT VFR BLD AUTO: 22 % (ref 34–46.6)
HGB BLD-MCNC: 7.1 G/DL (ref 12–15.9)
IMM GRANULOCYTES # BLD AUTO: 0.08 10*3/MM3 (ref 0–0.05)
IMM GRANULOCYTES NFR BLD AUTO: 1.1 % (ref 0–0.5)
LYMPHOCYTES # BLD AUTO: 1.17 10*3/MM3 (ref 0.7–3.1)
LYMPHOCYTES NFR BLD AUTO: 15.7 % (ref 19.6–45.3)
MCH RBC QN AUTO: 28 PG (ref 26.6–33)
MCHC RBC AUTO-ENTMCNC: 32.3 G/DL (ref 31.5–35.7)
MCV RBC AUTO: 86.6 FL (ref 79–97)
MONOCYTES # BLD AUTO: 0.56 10*3/MM3 (ref 0.1–0.9)
MONOCYTES NFR BLD AUTO: 7.5 % (ref 5–12)
NEUTROPHILS NFR BLD AUTO: 5.54 10*3/MM3 (ref 1.7–7)
NEUTROPHILS NFR BLD AUTO: 74.3 % (ref 42.7–76)
NRBC BLD AUTO-RTO: 0.1 /100 WBC (ref 0–0.2)
PHOSPHATE SERPL-MCNC: 2.9 MG/DL (ref 2.5–4.5)
PLATELET # BLD AUTO: 156 10*3/MM3 (ref 140–450)
PMV BLD AUTO: 11.2 FL (ref 6–12)
POTASSIUM SERPL-SCNC: 5 MMOL/L (ref 3.5–5.2)
RBC # BLD AUTO: 2.54 10*6/MM3 (ref 3.77–5.28)
SODIUM SERPL-SCNC: 130 MMOL/L (ref 136–145)
WBC NRBC COR # BLD: 7.45 10*3/MM3 (ref 3.4–10.8)

## 2022-12-07 PROCEDURE — 85025 COMPLETE CBC W/AUTO DIFF WBC: CPT | Performed by: NURSE PRACTITIONER

## 2022-12-07 PROCEDURE — 80069 RENAL FUNCTION PANEL: CPT | Performed by: HOSPITALIST

## 2022-12-07 PROCEDURE — 82962 GLUCOSE BLOOD TEST: CPT

## 2022-12-07 PROCEDURE — 86923 COMPATIBILITY TEST ELECTRIC: CPT

## 2022-12-07 PROCEDURE — 63710000001 INSULIN LISPRO (HUMAN) PER 5 UNITS: Performed by: NURSE PRACTITIONER

## 2022-12-07 RX ORDER — AMLODIPINE BESYLATE 10 MG/1
10 TABLET ORAL
Status: DISCONTINUED | OUTPATIENT
Start: 2022-12-08 | End: 2022-12-09 | Stop reason: HOSPADM

## 2022-12-07 RX ADMIN — ROPINIROLE 2 MG: 2 TABLET, FILM COATED ORAL at 21:44

## 2022-12-07 RX ADMIN — Medication 10 ML: at 21:45

## 2022-12-07 RX ADMIN — OXYCODONE HYDROCHLORIDE AND ACETAMINOPHEN 1 TABLET: 7.5; 325 TABLET ORAL at 09:22

## 2022-12-07 RX ADMIN — LEVOTHYROXINE SODIUM 275 MCG: 0.17 TABLET ORAL at 05:07

## 2022-12-07 RX ADMIN — APIXABAN 2.5 MG: 2.5 TABLET, FILM COATED ORAL at 21:44

## 2022-12-07 RX ADMIN — PANTOPRAZOLE SODIUM 40 MG: 40 TABLET, DELAYED RELEASE ORAL at 09:22

## 2022-12-07 RX ADMIN — FOLIC ACID 1 MG: 1 TABLET ORAL at 09:22

## 2022-12-07 RX ADMIN — DOCUSATE SODIUM 50MG AND SENNOSIDES 8.6MG 2 TABLET: 8.6; 5 TABLET, FILM COATED ORAL at 21:44

## 2022-12-07 RX ADMIN — SERTRALINE 150 MG: 100 TABLET, FILM COATED ORAL at 09:22

## 2022-12-07 RX ADMIN — INSULIN GLARGINE-YFGN 5 UNITS: 100 INJECTION, SOLUTION SUBCUTANEOUS at 21:45

## 2022-12-07 RX ADMIN — INSULIN LISPRO 5 UNITS: 100 INJECTION, SOLUTION INTRAVENOUS; SUBCUTANEOUS at 13:39

## 2022-12-07 RX ADMIN — CARVEDILOL 25 MG: 25 TABLET, FILM COATED ORAL at 18:01

## 2022-12-07 RX ADMIN — Medication 3 MG: at 21:44

## 2022-12-07 RX ADMIN — OXYCODONE HYDROCHLORIDE AND ACETAMINOPHEN 1 TABLET: 7.5; 325 TABLET ORAL at 13:39

## 2022-12-07 RX ADMIN — CARVEDILOL 25 MG: 25 TABLET, FILM COATED ORAL at 09:23

## 2022-12-07 RX ADMIN — AMLODIPINE BESYLATE 5 MG: 5 TABLET ORAL at 09:22

## 2022-12-07 RX ADMIN — APIXABAN 2.5 MG: 2.5 TABLET, FILM COATED ORAL at 09:22

## 2022-12-07 NOTE — PROGRESS NOTES
Name: Zaina Martinez ADMIT: 2022   : 1965  PCP: Jane Kyle NP    MRN: [id number] LOS: 1 days   AGE/SEX: 57 y.o. female  ROOM: [facility]     Subjective   Subjective   Slept fair. Ate ~ 50% breakfast. C/O rt hip pain.     Review of Systems   Constitutional: Positive for fatigue.   HENT: Negative for congestion.    Respiratory: Negative for shortness of breath.    Cardiovascular: Negative for chest pain.   Gastrointestinal: Negative for nausea.   Genitourinary: Negative for difficulty urinating.   Musculoskeletal: Positive for arthralgias.   Skin: Negative for rash.   Neurological: Negative for headaches.   Psychiatric/Behavioral: Negative for sleep disturbance.        Objective   Objective   Vital Signs  Temp:  [97.9 °F (36.6 °C)-99.1 °F (37.3 °C)] 99.1 °F (37.3 °C)  Heart Rate:  [65-68] 65  Resp:  [18-20] 20  BP: (159-185)/(76-90) 159/88  SpO2:  [93 %-98 %] 98 %  on   ;   Device (Oxygen Therapy): room air  Body mass index is 23.14 kg/m².  Physical Exam  Vitals and nursing note reviewed.   Constitutional:       General: She is not in acute distress.     Appearance: She is ill-appearing.   HENT:      Head: Normocephalic.      Mouth/Throat:      Mouth: Mucous membranes are moist.   Eyes:      Conjunctiva/sclera: Conjunctivae normal.   Cardiovascular:      Rate and Rhythm: Normal rate and regular rhythm.   Pulmonary:      Effort: Pulmonary effort is normal. No respiratory distress.      Breath sounds: Examination of the right-lower field reveals decreased breath sounds. Examination of the left-lower field reveals decreased breath sounds. Decreased breath sounds present.   Abdominal:      General: Bowel sounds are normal.      Palpations: Abdomen is soft.   Musculoskeletal:      Cervical back: Neck supple.      Right lower leg: No edema.      Left lower leg: No edema.   Skin:     General: Skin is warm and dry.   Neurological:      Mental Status: She is alert and oriented to person, place, and time.    Psychiatric:         Mood and Affect: Mood normal.         Behavior: Behavior normal.       Results Review     I reviewed the patient's new clinical results.  Results from last 7 days   Lab Units 12/07/22  0549 12/06/22  0657 12/05/22 0533 12/04/22 1942   WBC 10*3/mm3 7.45 8.33 7.75 9.81   HEMOGLOBIN g/dL 7.1* 7.4* 7.3* 8.2*   PLATELETS 10*3/mm3 156 164 145 180     Results from last 7 days   Lab Units 12/07/22  0549 12/06/22  0657 12/05/22 0533 12/04/22 1942   SODIUM mmol/L 130* 123* 132* 129*   POTASSIUM mmol/L 5.0 5.8* 5.1 5.7*   CHLORIDE mmol/L 99 88* 94* 89*   CO2 mmol/L 23.7 24.0 29.0 28.2   BUN mg/dL 26* 54* 44* 36*   CREATININE mg/dL 2.22* 3.70* 2.96* 3.08*   GLUCOSE mg/dL 138* 184* 168* 308*   EGFR mL/min/1.73 25.3* 13.7* 17.9* 17.1*     Results from last 7 days   Lab Units 12/07/22  0549 12/06/22 0657 12/04/22 1942   ALBUMIN g/dL 2.80* 2.70* 3.50   BILIRUBIN mg/dL  --   --  0.3   ALK PHOS U/L  --   --  211*   AST (SGOT) U/L  --   --  15   ALT (SGPT) U/L  --   --  <5     Results from last 7 days   Lab Units 12/07/22  0549 12/06/22  0657 12/05/22 0533 12/04/22 1942   CALCIUM mg/dL 8.0* 8.2* 7.9* 8.4*   ALBUMIN g/dL 2.80* 2.70*  --  3.50   MAGNESIUM mg/dL  --   --   --  1.9   PHOSPHORUS mg/dL 2.9 3.5  --   --      Results from last 7 days   Lab Units 12/04/22 1942   PROCALCITONIN ng/mL 0.12     Hemoglobin A1C   Date/Time Value Ref Range Status   12/05/2022 0533 8.90 (H) 4.80 - 5.60 % Final     Glucose   Date/Time Value Ref Range Status   12/07/2022 0802 145 (H) 70 - 130 mg/dL Final     Comment:     Meter: [id number] : [id number] Erlin Quin GÓMEZ   12/06/2022 1725 98 70 - 130 mg/dL Final     Comment:     Meter: [id number] : [id number] Erlin Quin GÓMEZ   12/06/2022 1216 155 (H) 70 - 130 mg/dL Final     Comment:     Meter: [id number] : [id number] Erlin Quin GÓMEZ   12/06/2022 0829 199 (H) 70 - 130 mg/dL Final     Comment:     Meter: [id number] : [id number] Erlin Quin GÓMEZ    12/05/2022 2104 194 (H) 70 - 130 mg/dL Final     Comment:     Meter: [id number] : [id number] Gerardo Gauthier CNA   12/05/2022 1610 123 70 - 130 mg/dL Final     Comment:     Meter: [id number] : [id number] Erlin MAGAÑA   12/05/2022 1201 183 (H) 70 - 130 mg/dL Final     Comment:     Meter: [id number] : [id number] Erlindeclan Tsai GÓMEZ       No radiology results for the last day  Scheduled Medications  [START ON 12/8/2022] amLODIPine, 10 mg, Oral, Q24H  apixaban, 2.5 mg, Oral, Q12H  carvedilol, 25 mg, Oral, BID With Meals  epoetin brooke/brooke-epbx, 10,000 Units, Intravenous, Once per day on Mon Wed Fri  folic acid, 1 mg, Oral, Daily  insulin glargine, 5 Units, Subcutaneous, Nightly  insulin lispro, 0-14 Units, Subcutaneous, TID With Meals  lactulose, 10 g, Oral, BID  levothyroxine, 275 mcg, Oral, Q AM  melatonin, 3 mg, Oral, Nightly  miconazole, 1 application, Topical, Q12H  pantoprazole, 40 mg, Oral, Daily  rOPINIRole, 2 mg, Oral, Nightly  sennosides-docusate, 2 tablet, Oral, Nightly  sertraline, 150 mg, Oral, Daily  sodium chloride, 10 mL, Intravenous, Q12H    Infusions   Diet  Diet: Regular/House Diet, Diabetic Diets, Renal Diets, Fluid Restriction (240 mL/tray) Diets; Consistent Carbohydrate; Low Potassium, Low Sodium (2-3g); Other (Specify mL/day) (1200); Texture: Regular Texture (IDDSI 7); Fluid Consistency: Thin (ID...       Assessment/Plan     Active Hospital Problems    Diagnosis  POA   • **Witnessed seizure-like activity (HCC) [R56.9]  Yes   • Ureteral stent present [Z96.0]  Not Applicable   • Closed intertrochanteric fracture of right femur (HCC) [S72.141A]  Yes   • Hyperkalemia [E87.5]  Yes   • Type 2 diabetes mellitus with hyperglycemia, with long-term current use of insulin (HCC) [E11.65, Z79.4]  Not Applicable   • Atrial flutter (HCC) [I48.92]  Yes   • Thrombus of venous dialysis catheter (HCC) [T82.118A]  Yes   • History of stroke- R MCA s/p TPA with subsequent ICH with debility [Z86.73]  Not  Applicable   • History of intracranial hemorrhage [Z86.79]  Not Applicable   • Hyponatremia [E87.1]  Yes   • Anemia due to chronic kidney disease, on chronic dialysis (HCC) [N18.6, D63.1, Z99.2]  Not Applicable   • Chronic diastolic CHF (congestive heart failure) (HCC) [I50.32]  Yes   • ESRD (end stage renal disease) ([facility]) [N18.6]  Yes   • Hypothyroidism [E03.9]  Yes      Resolved Hospital Problems   No resolved problems to display.       57 y.o. female admitted with Witnessed seizure-like activity ([facility]).    Seizure-like activity/Hx CVA/Hx ICH:  -Neurology has followed. CTH without acute ICH. EEG showed no focality or epileptiform activity. No indication for long term AED. Neurology recommending maximizing management of glucose    ESRD/Hyperkalemia/Hyponatremia/Thrombus of venous dialysis catheter:  -Nephrology following with plan for HD. Continue to monitor electrolytes. Plan dialysis today    Afib/flutter/CHF/HTN:  -HR controlled. Continue carvedilol. Diltiazem stopped last admission due to bradycardia. On eliquis. Amlodipine increased per Nephrology    Anemia:  -Hgb 7.1 today. Received IV iron yesterday. Plan PRBC w/HD per Nephrology    Hypothyroidism:  -Elevated TSH, normal FT4, low FT3. Dose of levothyroxine increased last admission to 275 mcg; will continue. Reinforced correct administration of levothyroxine. Will need outpatient Endocrine follow up and repeat TFTs in 4-6 weeks    Ureteral stent:  -In place due to hydronephrosis. Urology following    DM:  -A1C 8.90%. Continue long acting insulin 5 units, correctional scale. Hypoglycemia protocol in place      · Eliquis (home med) for DVT prophylaxis.  · Full code.  · Discussed with patient and nursing staff.  · Anticipate discharge home with home health when cleared by consultants.      RIKA William  [facility]  12/07/22  11:48 EST

## 2022-12-07 NOTE — PLAN OF CARE
Problem: Fall Injury Risk  Goal: Absence of Fall and Fall-Related Injury  Outcome: Ongoing, Progressing  Intervention: Identify and Manage Contributors  Recent Flowsheet Documentation  Taken 12/7/2022 1800 by Bipin Fontenot RN  Medication Review/Management: medications reviewed  Taken 12/7/2022 1600 by Bipin Fontenot RN  Medication Review/Management: medications reviewed  Taken 12/7/2022 1404 by Bipin Fontenot RN  Medication Review/Management: medications reviewed  Taken 12/7/2022 1201 by Bipin Fontenot RN  Medication Review/Management: medications reviewed  Taken 12/7/2022 1001 by Bipin Fontenot RN  Medication Review/Management: medications reviewed  Taken 12/7/2022 0800 by Bipin Fontenot RN  Medication Review/Management: medications reviewed  Intervention: Promote Injury-Free Environment  Recent Flowsheet Documentation  Taken 12/7/2022 1800 by Bipin Fontenot RN  Safety Promotion/Fall Prevention:   activity supervised   assistive device/personal items within reach   clutter free environment maintained   toileting scheduled   safety round/check completed   room organization consistent   nonskid shoes/slippers when out of bed   muscle strengthening facilitated   mobility aid in reach   gait belt   fall prevention program maintained  Taken 12/7/2022 1600 by Bipin Fontenot RN  Safety Promotion/Fall Prevention:   activity supervised   assistive device/personal items within reach   clutter free environment maintained   toileting scheduled   safety round/check completed   room organization consistent   nonskid shoes/slippers when out of bed   muscle strengthening facilitated   mobility aid in reach   gait belt   fall prevention program maintained  Taken 12/7/2022 1404 by Bipin Fontenot RN  Safety Promotion/Fall Prevention:   activity supervised   assistive device/personal items within reach   clutter free environment maintained   safety round/check completed   room organization consistent   toileting  scheduled   nonskid shoes/slippers when out of bed   muscle strengthening facilitated   mobility aid in reach   gait belt   fall prevention program maintained  Taken 12/7/2022 1201 by Bipin Fontenot RN  Safety Promotion/Fall Prevention:   activity supervised   assistive device/personal items within reach   clutter free environment maintained   room organization consistent   safety round/check completed   toileting scheduled   nonskid shoes/slippers when out of bed   muscle strengthening facilitated   fall prevention program maintained   gait belt   mobility aid in reach  Taken 12/7/2022 1001 by Bipin Fontenot RN  Safety Promotion/Fall Prevention:   activity supervised   clutter free environment maintained   assistive device/personal items within reach   toileting scheduled   safety round/check completed   room organization consistent   nonskid shoes/slippers when out of bed   muscle strengthening facilitated   mobility aid in reach   gait belt   fall prevention program maintained  Taken 12/7/2022 0800 by Bipin Fontenot RN  Safety Promotion/Fall Prevention:   assistive device/personal items within reach   activity supervised   clutter free environment maintained   toileting scheduled   safety round/check completed   room organization consistent   nonskid shoes/slippers when out of bed   muscle strengthening facilitated   mobility aid in reach   fall prevention program maintained     Problem: Seizure, Active Management  Goal: Absence of Seizure/Seizure-Related Injury  Outcome: Ongoing, Progressing     Problem: Skin Injury Risk Increased  Goal: Skin Health and Integrity  Outcome: Ongoing, Progressing  Intervention: Optimize Skin Protection  Recent Flowsheet Documentation  Taken 12/7/2022 1800 by Bipin Fontenot RN  Head of Bed (HOB) Positioning: HOB at 30 degrees  Taken 12/7/2022 1600 by Bipin Fontenot RN  Head of Bed (HOB) Positioning: HOB at 30 degrees  Taken 12/7/2022 1404 by Bipin Fontenot RN  Head of  Bed (HOB) Positioning: HOB at 20-30 degrees  Taken 12/7/2022 1201 by Bipin Fontenot RN  Head of Bed (HOB) Positioning: HOB at 30 degrees  Taken 12/7/2022 1001 by Bipin Fontenot RN  Head of Bed (HOB) Positioning: HOB at 30 degrees  Taken 12/7/2022 0800 by Bipin Fontenot RN  Head of Bed (HOB) Positioning: HOB at 30 degrees   Goal Outcome Evaluation:

## 2022-12-07 NOTE — PLAN OF CARE
Patient stable throughout shift. No concerns or complaints noted.     Problem: Fall Injury Risk  Goal: Absence of Fall and Fall-Related Injury  Outcome: Ongoing, Progressing  Intervention: Identify and Manage Contributors  Recent Flowsheet Documentation  Taken 12/6/2022 2100 by Meg Washington RN  Medication Review/Management: medications reviewed  Self-Care Promotion: independence encouraged  Intervention: Promote Injury-Free Environment  Recent Flowsheet Documentation  Taken 12/7/2022 0520 by Meg Washington RN  Safety Promotion/Fall Prevention: safety round/check completed  Taken 12/7/2022 0300 by Meg Washington RN  Safety Promotion/Fall Prevention: safety round/check completed  Taken 12/7/2022 0150 by Meg Washington RN  Safety Promotion/Fall Prevention: safety round/check completed  Taken 12/6/2022 2345 by Meg Washington RN  Safety Promotion/Fall Prevention: safety round/check completed  Taken 12/6/2022 2100 by Meg Washington RN  Safety Promotion/Fall Prevention:   safety round/check completed   fall prevention program maintained   assistive device/personal items within reach   activity supervised     Problem: Seizure, Active Management  Goal: Absence of Seizure/Seizure-Related Injury  Outcome: Ongoing, Progressing   Goal Outcome Evaluation:

## 2022-12-07 NOTE — PROGRESS NOTES
Continued Stay Note  [facility]     Patient Name: Zaina Martinez  MRN: [id number]  Today's Date: 12/7/2022    Admit Date: 12/4/2022    Plan: Home with family and [facility]   Discharge Plan     Row Name 12/07/22 1017       Plan    Plan Home with family and [facility]    Patient/Family in Agreement with Plan yes    Plan Comments Plan remains for pt to return home with her  and grandchildren and continued [facility]/following. CCP to follow for additional needs. Cinthia Riley LCSW               Discharge Codes    No documentation.               Expected Discharge Date and Time     Expected Discharge Date Expected Discharge Time    Dec 7, 2022             Dana Riley LCSW

## 2022-12-07 NOTE — PROGRESS NOTES
[facility] Progress Note      Patient Name: Zaina Martinez  : 1965  MRN: [id number]  Primary Care Physician:  Jane Kyle NP  Date of admission: 2022    Subjective     Interval History:   Follow up ESRD.  Very weak today. Eating fair.      Review of Systems:   As noted above    Objective     Vitals:   Temp:  [97.9 °F (36.6 °C)-99.1 °F (37.3 °C)] 99.1 °F (37.3 °C)  Heart Rate:  [65-68] 65  Resp:  [18-20] 20  BP: (168-185)/(76-90) 172/84    Intake/Output Summary (Last 24 hours) at 2022 1037  Last data filed at 2022 1717  Gross per 24 hour   Intake --   Output 2100 ml   Net -2100 ml       Physical Exam:    General Appearance: alert, frail. Pale.   Skin: warm and dry  HEENT: oral mucosa normal, nonicteric sclera  Neck: supple, no JVD  Lungs: Clear to auscultation  Heart: RRR, no s3 or rub  Abdomen: soft, nontender, non-distended. + bs  Extremities: no edema.   Neuro: normal speech and mental status     Scheduled Meds:     amLODIPine, 5 mg, Oral, Q24H  apixaban, 2.5 mg, Oral, Q12H  carvedilol, 25 mg, Oral, BID With Meals  folic acid, 1 mg, Oral, Daily  insulin glargine, 5 Units, Subcutaneous, Nightly  insulin lispro, 0-14 Units, Subcutaneous, TID With Meals  lactulose, 10 g, Oral, BID  levothyroxine, 275 mcg, Oral, Q AM  melatonin, 3 mg, Oral, Nightly  miconazole, 1 application, Topical, Q12H  pantoprazole, 40 mg, Oral, Daily  rOPINIRole, 2 mg, Oral, Nightly  sennosides-docusate, 2 tablet, Oral, Nightly  sertraline, 150 mg, Oral, Daily  sodium chloride, 10 mL, Intravenous, Q12H      IV Meds:        Results Reviewed:   I have personally reviewed the results from the time of this admission to 2022 10:37 EST     Results from last 7 days   Lab Units 22  0549 22  0657 22  0533 22  1942   SODIUM mmol/L 130* 123* 132* 129*   POTASSIUM mmol/L 5.0 5.8* 5.1 5.7*   CHLORIDE mmol/L 99 88* 94* 89*   CO2 mmol/L 23.7 24.0 29.0 28.2   BUN mg/dL 26* 54*  44* 36*   CREATININE mg/dL 2.22* 3.70* 2.96* 3.08*   CALCIUM mg/dL 8.0* 8.2* 7.9* 8.4*   BILIRUBIN mg/dL  --   --   --  0.3   ALK PHOS U/L  --   --   --  211*   ALT (SGPT) U/L  --   --   --  <5   AST (SGOT) U/L  --   --   --  15   GLUCOSE mg/dL 138* 184* 168* 308*       Estimated Creatinine Clearance: 25.3 mL/min (A) (by C-G formula based on SCr of 2.22 mg/dL (H)).    Results from last 7 days   Lab Units 12/07/22  0549 12/06/22  0657 12/04/22 1942   MAGNESIUM mg/dL  --   --  1.9   PHOSPHORUS mg/dL 2.9 3.5  --              Results from last 7 days   Lab Units 12/07/22  0549 12/06/22  0657 12/05/22  0533 12/04/22 1942   WBC 10*3/mm3 7.45 8.33 7.75 9.81   HEMOGLOBIN g/dL 7.1* 7.4* 7.3* 8.2*   PLATELETS 10*3/mm3 156 164 145 180       Results from last 7 days   Lab Units 12/04/22 1942   INR  1.27*       Assessment / Plan     ASSESSMENT:  1. ESRD.  Dialysis today to get on her MWF schedule.  Hyperkalemia is an issue for her, so important to be consistent .  2. Left ureteral stent. MONET jade noted with plans for cysto with stent removal at some point.   3. Anemia CKD.  Although iron sat low, ferritin over 500. Received IV ferrlecit x 1 dose 12/6.   Give a unit of PRBC today. She has been in the hospital so frequently, not getting her outpatient.  ANDRAE.  4. Seizure activity. EEG negative.   5. DM2 uncontrolled. LHA addressing  6. Hypothyroid on replacement .  TSH persistently elevated despite increase in doses.   7. Prior Afib, flutter. On eliquis and coreg. Rate controlled.   8. HTN. Not controlled.   PLAN:  1. Dialysis today.  2. PRBC with HD today.  3. Procrit with HD today.  4. Weight today standing.   5. Increase norvasc to 10 mg daily.   6. Po fluid restrict.   Ruth Lira MD  12/07/22  10:37 EST    [facility]  [phone]

## 2022-12-08 ENCOUNTER — APPOINTMENT (OUTPATIENT)
Dept: GENERAL RADIOLOGY | Facility: HOSPITAL | Age: 57
End: 2022-12-08

## 2022-12-08 LAB
ABO GROUP BLD: NORMAL
ALBUMIN SERPL-MCNC: 3.3 G/DL (ref 3.5–5.2)
ANION GAP SERPL CALCULATED.3IONS-SCNC: 8 MMOL/L (ref 5–15)
BH BB BLOOD EXPIRATION DATE: NORMAL
BH BB BLOOD TYPE BARCODE: 5100
BH BB DISPENSE STATUS: NORMAL
BH BB PRODUCT CODE: NORMAL
BH BB UNIT NUMBER: NORMAL
BLD GP AB SCN SERPL QL: NEGATIVE
BUN SERPL-MCNC: 12 MG/DL (ref 6–20)
BUN/CREAT SERPL: 7.5 (ref 7–25)
CALCIUM SPEC-SCNC: 8.4 MG/DL (ref 8.6–10.5)
CHLORIDE SERPL-SCNC: 99 MMOL/L (ref 98–107)
CO2 SERPL-SCNC: 28 MMOL/L (ref 22–29)
CREAT SERPL-MCNC: 1.6 MG/DL (ref 0.57–1)
CROSSMATCH INTERPRETATION: NORMAL
DEPRECATED RDW RBC AUTO: 51.3 FL (ref 37–54)
EGFRCR SERPLBLD CKD-EPI 2021: 37.5 ML/MIN/1.73
ERYTHROCYTE [DISTWIDTH] IN BLOOD BY AUTOMATED COUNT: 16.2 % (ref 12.3–15.4)
GLUCOSE BLDC GLUCOMTR-MCNC: 100 MG/DL (ref 70–130)
GLUCOSE BLDC GLUCOMTR-MCNC: 164 MG/DL (ref 70–130)
GLUCOSE BLDC GLUCOMTR-MCNC: 209 MG/DL (ref 70–130)
GLUCOSE BLDC GLUCOMTR-MCNC: 74 MG/DL (ref 70–130)
GLUCOSE BLDC GLUCOMTR-MCNC: 98 MG/DL (ref 70–130)
GLUCOSE SERPL-MCNC: 101 MG/DL (ref 65–99)
HCT VFR BLD AUTO: 30.2 % (ref 34–46.6)
HGB BLD-MCNC: 9.9 G/DL (ref 12–15.9)
MCH RBC QN AUTO: 28.7 PG (ref 26.6–33)
MCHC RBC AUTO-ENTMCNC: 32.8 G/DL (ref 31.5–35.7)
MCV RBC AUTO: 87.5 FL (ref 79–97)
PHOSPHATE SERPL-MCNC: 2.1 MG/DL (ref 2.5–4.5)
PLATELET # BLD AUTO: 173 10*3/MM3 (ref 140–450)
PMV BLD AUTO: 9.9 FL (ref 6–12)
POTASSIUM SERPL-SCNC: 3.7 MMOL/L (ref 3.5–5.2)
RBC # BLD AUTO: 3.45 10*6/MM3 (ref 3.77–5.28)
RH BLD: POSITIVE
SODIUM SERPL-SCNC: 135 MMOL/L (ref 136–145)
T&S EXPIRATION DATE: NORMAL
UNIT  ABO: NORMAL
UNIT  RH: NORMAL
WBC NRBC COR # BLD: 5.44 10*3/MM3 (ref 3.4–10.8)

## 2022-12-08 PROCEDURE — 80069 RENAL FUNCTION PANEL: CPT | Performed by: INTERNAL MEDICINE

## 2022-12-08 PROCEDURE — P9016 RBC LEUKOCYTES REDUCED: HCPCS

## 2022-12-08 PROCEDURE — 63710000001 INSULIN LISPRO (HUMAN) PER 5 UNITS: Performed by: NURSE PRACTITIONER

## 2022-12-08 PROCEDURE — 82962 GLUCOSE BLOOD TEST: CPT

## 2022-12-08 PROCEDURE — 86923 COMPATIBILITY TEST ELECTRIC: CPT

## 2022-12-08 PROCEDURE — 85027 COMPLETE CBC AUTOMATED: CPT | Performed by: INTERNAL MEDICINE

## 2022-12-08 PROCEDURE — 73502 X-RAY EXAM HIP UNI 2-3 VIEWS: CPT

## 2022-12-08 PROCEDURE — 86850 RBC ANTIBODY SCREEN: CPT | Performed by: INTERNAL MEDICINE

## 2022-12-08 PROCEDURE — 86901 BLOOD TYPING SEROLOGIC RH(D): CPT | Performed by: INTERNAL MEDICINE

## 2022-12-08 PROCEDURE — 25010000002 EPOETIN ALFA-EPBX 10000 UNIT/ML SOLUTION: Performed by: INTERNAL MEDICINE

## 2022-12-08 PROCEDURE — 86900 BLOOD TYPING SEROLOGIC ABO: CPT | Performed by: INTERNAL MEDICINE

## 2022-12-08 PROCEDURE — 86900 BLOOD TYPING SEROLOGIC ABO: CPT

## 2022-12-08 PROCEDURE — 25010000002 HEPARIN (PORCINE) PER 1000 UNITS: Performed by: INTERNAL MEDICINE

## 2022-12-08 RX ORDER — HEPARIN SODIUM 1000 [USP'U]/ML
3800 INJECTION, SOLUTION INTRAVENOUS; SUBCUTANEOUS AS NEEDED
Status: DISCONTINUED | OUTPATIENT
Start: 2022-12-08 | End: 2022-12-09 | Stop reason: HOSPADM

## 2022-12-08 RX ADMIN — INSULIN GLARGINE-YFGN 5 UNITS: 100 INJECTION, SOLUTION SUBCUTANEOUS at 21:42

## 2022-12-08 RX ADMIN — OXYCODONE HYDROCHLORIDE AND ACETAMINOPHEN 1 TABLET: 7.5; 325 TABLET ORAL at 14:00

## 2022-12-08 RX ADMIN — CARVEDILOL 25 MG: 25 TABLET, FILM COATED ORAL at 10:34

## 2022-12-08 RX ADMIN — SERTRALINE 150 MG: 100 TABLET, FILM COATED ORAL at 10:35

## 2022-12-08 RX ADMIN — CARVEDILOL 25 MG: 25 TABLET, FILM COATED ORAL at 17:22

## 2022-12-08 RX ADMIN — LEVOTHYROXINE SODIUM 275 MCG: 0.17 TABLET ORAL at 05:35

## 2022-12-08 RX ADMIN — EPOETIN ALFA-EPBX 10000 UNITS: 10000 INJECTION, SOLUTION INTRAVENOUS; SUBCUTANEOUS at 11:31

## 2022-12-08 RX ADMIN — HEPARIN SODIUM 3800 UNITS: 1000 INJECTION INTRAVENOUS; SUBCUTANEOUS at 12:00

## 2022-12-08 RX ADMIN — OXYCODONE HYDROCHLORIDE AND ACETAMINOPHEN 1 TABLET: 7.5; 325 TABLET ORAL at 09:57

## 2022-12-08 RX ADMIN — OXYCODONE HYDROCHLORIDE AND ACETAMINOPHEN 1 TABLET: 7.5; 325 TABLET ORAL at 17:56

## 2022-12-08 RX ADMIN — AMLODIPINE BESYLATE 10 MG: 10 TABLET ORAL at 10:34

## 2022-12-08 RX ADMIN — OXYCODONE HYDROCHLORIDE AND ACETAMINOPHEN 1 TABLET: 7.5; 325 TABLET ORAL at 03:05

## 2022-12-08 RX ADMIN — APIXABAN 2.5 MG: 2.5 TABLET, FILM COATED ORAL at 21:43

## 2022-12-08 RX ADMIN — Medication 3 MG: at 21:43

## 2022-12-08 RX ADMIN — INSULIN LISPRO 5 UNITS: 100 INJECTION, SOLUTION INTRAVENOUS; SUBCUTANEOUS at 17:22

## 2022-12-08 RX ADMIN — ROPINIROLE 2 MG: 2 TABLET, FILM COATED ORAL at 21:43

## 2022-12-08 NOTE — PROGRESS NOTES
Name: Zaina Martinez ADMIT: 2022   : 1965  PCP: Jane Kyle NP    MRN: [id number] LOS: 2 days   AGE/SEX: 57 y.o. female  ROOM: [facility]/     Subjective   Subjective   Seen post dialysis. Feels well, stronger today. +BM.     Review of Systems   Constitutional: Negative for fever.   HENT: Negative for congestion.    Respiratory: Negative for shortness of breath.    Cardiovascular: Negative for chest pain.   Gastrointestinal: Negative for constipation.   Genitourinary: Negative for difficulty urinating.   Musculoskeletal: Positive for arthralgias.   Skin: Negative for rash.   Neurological: Negative for headaches.   Psychiatric/Behavioral: Negative for sleep disturbance.        Objective   Objective   Vital Signs  Temp:  [97.9 °F (36.6 °C)-98.4 °F (36.9 °C)] 98.4 °F (36.9 °C)  Heart Rate:  [59-62] 61  Resp:  [16-18] 16  BP: (114-186)/(72-98) 186/83  SpO2:  [92 %-100 %] 100 %  on  Flow (L/min):  [2] 2;   Device (Oxygen Therapy): nasal cannula  Body mass index is 10.88 kg/m².  Physical Exam  Vitals and nursing note reviewed.   Constitutional:       General: She is not in acute distress.     Appearance: She is ill-appearing.      Comments: Chronically ill appearing   HENT:      Head: Normocephalic.      Mouth/Throat:      Mouth: Mucous membranes are moist.   Eyes:      Conjunctiva/sclera: Conjunctivae normal.   Cardiovascular:      Rate and Rhythm: Normal rate and regular rhythm.   Pulmonary:      Effort: Pulmonary effort is normal. No respiratory distress.      Breath sounds: No wheezing or rales.   Abdominal:      General: Bowel sounds are normal.      Palpations: Abdomen is soft.   Musculoskeletal:      Cervical back: Neck supple.      Right lower leg: No edema.      Left lower leg: No edema.   Skin:     General: Skin is warm and dry.   Neurological:      Mental Status: She is alert and oriented to person, place, and time.   Psychiatric:         Mood and Affect: Mood normal.         Behavior: Behavior  normal.       Results Review     I reviewed the patient's new clinical results.  Results from last 7 days   Lab Units 12/07/22  0549 12/06/22  0657 12/05/22 0533 12/04/22 1942   WBC 10*3/mm3 7.45 8.33 7.75 9.81   HEMOGLOBIN g/dL 7.1* 7.4* 7.3* 8.2*   PLATELETS 10*3/mm3 156 164 145 180     Results from last 7 days   Lab Units 12/07/22  0549 12/06/22  0657 12/05/22  0533 12/04/22 1942   SODIUM mmol/L 130* 123* 132* 129*   POTASSIUM mmol/L 5.0 5.8* 5.1 5.7*   CHLORIDE mmol/L 99 88* 94* 89*   CO2 mmol/L 23.7 24.0 29.0 28.2   BUN mg/dL 26* 54* 44* 36*   CREATININE mg/dL 2.22* 3.70* 2.96* 3.08*   GLUCOSE mg/dL 138* 184* 168* 308*   EGFR mL/min/1.73 25.3* 13.7* 17.9* 17.1*     Results from last 7 days   Lab Units 12/07/22  0549 12/06/22  0657 12/04/22 1942   ALBUMIN g/dL 2.80* 2.70* 3.50   BILIRUBIN mg/dL  --   --  0.3   ALK PHOS U/L  --   --  211*   AST (SGOT) U/L  --   --  15   ALT (SGPT) U/L  --   --  <5     Results from last 7 days   Lab Units 12/07/22  0549 12/06/22  0657 12/05/22 0533 12/04/22 1942   CALCIUM mg/dL 8.0* 8.2* 7.9* 8.4*   ALBUMIN g/dL 2.80* 2.70*  --  3.50   MAGNESIUM mg/dL  --   --   --  1.9   PHOSPHORUS mg/dL 2.9 3.5  --   --      Results from last 7 days   Lab Units 12/04/22 1942   PROCALCITONIN ng/mL 0.12     Glucose   Date/Time Value Ref Range Status   12/08/2022 0853 100 70 - 130 mg/dL Final     Comment:     Meter: [id number] : [id number] León Knapp RN   12/08/2022 0706 74 70 - 130 mg/dL Final     Comment:     Meter: [id number] : [id number] Nicole Alvarez NA   12/07/2022 2136 137 (H) 70 - 130 mg/dL Final     Comment:     Meter: [id number] : [id number] Felix Weaver RN   12/07/2022 1643 106 70 - 130 mg/dL Final     Comment:     Meter: [id number] : [id number] Erlin Tsai GÓMEZ   12/07/2022 1252 205 (H) 70 - 130 mg/dL Final     Comment:     Meter: [id number] : [id number] Erlin Quin GÓMEZ   12/07/2022 0802 145 (H) 70 - 130 mg/dL Final     Comment:     Meter: [id number]  : [id number] Erlin Tsai GÓMEZ   12/06/2022 1725 98 70 - 130 mg/dL Final     Comment:     Meter: [id number] : [id number] Erlin Tsai GÓMEZ       No radiology results for the last day  Scheduled Medications  amLODIPine, 10 mg, Oral, Q24H  apixaban, 2.5 mg, Oral, Q12H  carvedilol, 25 mg, Oral, BID With Meals  epoetin brooke/brooke-epbx, 10,000 Units, Intravenous, Once per day on Mon Wed Fri  folic acid, 1 mg, Oral, Daily  insulin glargine, 5 Units, Subcutaneous, Nightly  insulin lispro, 0-14 Units, Subcutaneous, TID With Meals  lactulose, 10 g, Oral, BID  levothyroxine, 275 mcg, Oral, Q AM  melatonin, 3 mg, Oral, Nightly  miconazole, 1 application, Topical, Q12H  pantoprazole, 40 mg, Oral, Daily  rOPINIRole, 2 mg, Oral, Nightly  sennosides-docusate, 2 tablet, Oral, Nightly  sertraline, 150 mg, Oral, Daily  sodium chloride, 10 mL, Intravenous, Q12H    Infusions   Diet  Diet: Regular/House Diet, Diabetic Diets, Renal Diets, Fluid Restriction (240 mL/tray) Diets; Consistent Carbohydrate; Low Potassium, Low Sodium (2-3g); Other (Specify mL/day) (1200); Texture: Regular Texture (IDDSI 7); Fluid Consistency: Thin (ID...       Assessment/Plan     Active Hospital Problems    Diagnosis  POA   • **Witnessed seizure-like activity (HCC) [R56.9]  Yes   • Ureteral stent present [Z96.0]  Not Applicable   • Closed intertrochanteric fracture of right femur (HCC) [S72.141A]  Yes   • Hyperkalemia [E87.5]  Yes   • Type 2 diabetes mellitus with hyperglycemia, with long-term current use of insulin (HCC) [E11.65, Z79.4]  Not Applicable   • Atrial flutter (HCC) [I48.92]  Yes   • Thrombus of venous dialysis catheter (HCC) [T82.868A]  Yes   • History of stroke- R MCA s/p TPA with subsequent ICH with debility [Z86.73]  Not Applicable   • History of intracranial hemorrhage [Z86.79]  Not Applicable   • Hyponatremia [E87.1]  Yes   • Anemia due to chronic kidney disease, on chronic dialysis (HCC) [N18.6, D63.1, Z99.2]  Not Applicable   •  Chronic diastolic CHF (congestive heart failure) ([facility]) [I50.32]  Yes   • ESRD (end stage renal disease) ([facility]) [N18.6]  Yes   • Hypothyroidism [E03.9]  Yes      Resolved Hospital Problems   No resolved problems to display.       57 y.o. female admitted with Witnessed seizure-like activity ([facility]).    Seizure-like activity/Hx CVA/Hx ICH:  -Neurology has followed. CTH without acute ICH. EEG showed no focality or epileptiform activity. No indication for long term AED. No further events    ESRD/Hyperkalemia/Hyponatremia/Thrombus of venous dialysis catheter:  -Nephrology managing. S/p HD today. Follows MWF schedule outpatient    Afib/flutter/CHF/HTN:  -HR controlled. Continue carvedilol, increased dose amlodipine. On eliquis    Anemia:  -Received PRBC and procrit w/dialysis today    Hypothyroidism:  -Elevated TSH, FT4, Low FT3. Levothyroxine dose adjusted prev admission; continue 275 mcg. Will need outpatient Endocrine follow up and repeat TFT's in 4-6 weeks    Ureteral stent:  -In place due to hydronephrosis. Urology has evaluated; plan to remove at some point    DM:  -A1C 8.90%. Continue long acting insulin 5 units and correctional scale. Hypoglycemia protocol    Remove rt hip staples today      · Eliquis (home med) for DVT prophylaxis.  · Full code.  · Discussed with patient.  · Anticipate discharge home with home health when cleared by Nephrology      RIKA William  [facility]  12/08/22  12:26 EST

## 2022-12-08 NOTE — PROGRESS NOTES
[facility] Progress Note      Patient Name: Zaina Martinez  : 1965  MRN: [id number]  Primary Care Physician:  Jane Kyle NP  Date of admission: 2022    Subjective     Interval History:   Follow up ESRD. On dialysis. Qb 400.   systolic. Goal 3.4 . Getting PRBC.     Review of Systems:   As noted above    Objective     Vitals:   Temp:  [97.9 °F (36.6 °C)-98.6 °F (37 °C)] 98.2 °F (36.8 °C)  Heart Rate:  [59-65] 59  Resp:  [16-20] 16  BP: (114-162)/(72-98) 114/72  Flow (L/min):  [2] 2  No intake or output data in the 24 hours ending 22 1121    Physical Exam:    General Appearance: alert, frail. Pale. On dialysis .  Skin: warm and dry  HEENT: oral mucosa normal, nonicteric sclera  Neck: supple, no JVD  Lungs: Clear to auscultation. No wheezing .  Heart: RRR, no s3 or rub  Abdomen: soft, nontender, non-distended. + bs  Extremities: no edema.   Neuro: normal speech and mental status     Scheduled Meds:     amLODIPine, 10 mg, Oral, Q24H  apixaban, 2.5 mg, Oral, Q12H  carvedilol, 25 mg, Oral, BID With Meals  epoetin brooke/brooke-epbx, 10,000 Units, Intravenous, Once per day on   folic acid, 1 mg, Oral, Daily  insulin glargine, 5 Units, Subcutaneous, Nightly  insulin lispro, 0-14 Units, Subcutaneous, TID With Meals  lactulose, 10 g, Oral, BID  levothyroxine, 275 mcg, Oral, Q AM  melatonin, 3 mg, Oral, Nightly  miconazole, 1 application, Topical, Q12H  pantoprazole, 40 mg, Oral, Daily  rOPINIRole, 2 mg, Oral, Nightly  sennosides-docusate, 2 tablet, Oral, Nightly  sertraline, 150 mg, Oral, Daily  sodium chloride, 10 mL, Intravenous, Q12H      IV Meds:        Results Reviewed:   I have personally reviewed the results from the time of this admission to 2022 11:21 EST     Results from last 7 days   Lab Units 22  0549 22  0657 22  0533 22  194   SODIUM mmol/L 130* 123* 132* 129*   POTASSIUM mmol/L 5.0 5.8* 5.1 5.7*   CHLORIDE mmol/L 99  88* 94* 89*   CO2 mmol/L 23.7 24.0 29.0 28.2   BUN mg/dL 26* 54* 44* 36*   CREATININE mg/dL 2.22* 3.70* 2.96* 3.08*   CALCIUM mg/dL 8.0* 8.2* 7.9* 8.4*   BILIRUBIN mg/dL  --   --   --  0.3   ALK PHOS U/L  --   --   --  211*   ALT (SGPT) U/L  --   --   --  <5   AST (SGOT) U/L  --   --   --  15   GLUCOSE mg/dL 138* 184* 168* 308*       Estimated Creatinine Clearance: 11.9 mL/min (A) (by C-G formula based on SCr of 2.22 mg/dL (H)).    Results from last 7 days   Lab Units 12/07/22  0549 12/06/22  0657 12/04/22 1942   MAGNESIUM mg/dL  --   --  1.9   PHOSPHORUS mg/dL 2.9 3.5  --              Results from last 7 days   Lab Units 12/07/22  0549 12/06/22  0657 12/05/22  0533 12/04/22 1942   WBC 10*3/mm3 7.45 8.33 7.75 9.81   HEMOGLOBIN g/dL 7.1* 7.4* 7.3* 8.2*   PLATELETS 10*3/mm3 156 164 145 180       Results from last 7 days   Lab Units 12/04/22 1942   INR  1.27*       Assessment / Plan     ASSESSMENT:  1. ESRD.  Dialysis today to get on her MWF schedule.  Hyperkalemia is an issue for her, so important to be consistent .  2. Left ureteral stent. MONET jade noted with plans for cysto with stent removal at some point.   3. Anemia CKD.  Although iron sat low, ferritin over 500. Received IV ferrlecit x 1 dose 12/6.   Give a unit of PRBC today. She has been in the hospital so frequently, not getting her outpatient.  ANDRAE.  4. Seizure activity. EEG negative.   5. DM2 uncontrolled. LHA addressing  6. Hypothyroid on replacement .  TSH persistently elevated despite increase in doses.   7. Prior Afib, flutter. On eliquis and coreg. Rate controlled.   8. HTN. Not controlled.   PLAN:  1. Dialysis today.  2. PRBC with HD today.  3. Procrit with HD today.    Ruth Lira MD  12/08/22  11:21 [facility]    [facility]  [phone]

## 2022-12-08 NOTE — NURSING NOTE
HD WITHOUT INCIDENT OR C/O. TOLERATED WELL. REMOVED 2.5 L. RECEIVED 1 UNIT PRBC'S AND RETACRIT AS ORDERED. RAUL CURRENT, CDI. STABLE UPON COMPLETION OF HD.

## 2022-12-08 NOTE — PROGRESS NOTES
The patient is known to me from her right femur intramedullary nailing for intertrochanteric fracture November 10, 2022.  She was advised to follow-up with me in the office unfortunately she was unable to due to this admission.    I was notified by the admitting physician regarding her admission and a request to check her incision.    She is on dialysis.    Her incision is healthy no sign of infection I have requested the nurse to remove the staples today and they have been removed.  The incision is healthy there is no sign of infection.  She is able to ambulate weightbearing as tolerated with the help of a walker.    No signs of DVT    I have ordered a x-ray of the right hip and reviewed it.  The intramedullary nail is in stable position.  The fracture is showing progressive signs of healing.  Satisfactory alignment.    Plan  She will continue ambulation with the help of a walker weightbearing as tolerated  Okay to shower  Follow-up with me in the office in 3 months she will call the office to make the appointment [phone]  I will sign off but will be available

## 2022-12-09 ENCOUNTER — READMISSION MANAGEMENT (OUTPATIENT)
Dept: CALL CENTER | Facility: HOSPITAL | Age: 57
End: 2022-12-09

## 2022-12-09 VITALS
BODY MASS INDEX: 23.41 KG/M2 | HEIGHT: 62 IN | DIASTOLIC BLOOD PRESSURE: 75 MMHG | RESPIRATION RATE: 16 BRPM | OXYGEN SATURATION: 100 % | TEMPERATURE: 98.2 F | HEART RATE: 61 BPM | SYSTOLIC BLOOD PRESSURE: 165 MMHG | WEIGHT: 127.21 LBS

## 2022-12-09 LAB
BH BB BLOOD EXPIRATION DATE: NORMAL
BH BB BLOOD TYPE BARCODE: 5100
BH BB DISPENSE STATUS: NORMAL
BH BB PRODUCT CODE: NORMAL
BH BB UNIT NUMBER: NORMAL
CROSSMATCH INTERPRETATION: NORMAL
GLUCOSE BLDC GLUCOMTR-MCNC: 108 MG/DL (ref 70–130)
GLUCOSE BLDC GLUCOMTR-MCNC: 76 MG/DL (ref 70–130)
GLUCOSE BLDC GLUCOMTR-MCNC: 94 MG/DL (ref 70–130)
UNIT  ABO: NORMAL
UNIT  RH: NORMAL

## 2022-12-09 PROCEDURE — 25010000002 EPOETIN ALFA-EPBX 10000 UNIT/ML SOLUTION: Performed by: INTERNAL MEDICINE

## 2022-12-09 PROCEDURE — 82962 GLUCOSE BLOOD TEST: CPT

## 2022-12-09 PROCEDURE — 25010000002 HEPARIN (PORCINE) PER 1000 UNITS: Performed by: INTERNAL MEDICINE

## 2022-12-09 RX ORDER — LEVOTHYROXINE SODIUM 0.07 MG/1
75 TABLET ORAL DAILY
Qty: 30 TABLET | Refills: 0 | Status: SHIPPED | OUTPATIENT
Start: 2022-12-09 | End: 2023-01-29 | Stop reason: HOSPADM

## 2022-12-09 RX ORDER — LEVOTHYROXINE SODIUM 0.2 MG/1
200 TABLET ORAL
Qty: 30 TABLET | Refills: 0 | Status: SHIPPED | OUTPATIENT
Start: 2022-12-10 | End: 2023-01-29 | Stop reason: HOSPADM

## 2022-12-09 RX ORDER — AMLODIPINE BESYLATE 10 MG/1
10 TABLET ORAL
Qty: 30 TABLET | Refills: 0 | Status: SHIPPED | OUTPATIENT
Start: 2022-12-09

## 2022-12-09 RX ADMIN — FOLIC ACID 1 MG: 1 TABLET ORAL at 08:44

## 2022-12-09 RX ADMIN — SERTRALINE 150 MG: 100 TABLET, FILM COATED ORAL at 08:44

## 2022-12-09 RX ADMIN — EPOETIN ALFA-EPBX 10000 UNITS: 10000 INJECTION, SOLUTION INTRAVENOUS; SUBCUTANEOUS at 15:16

## 2022-12-09 RX ADMIN — PANTOPRAZOLE SODIUM 40 MG: 40 TABLET, DELAYED RELEASE ORAL at 08:44

## 2022-12-09 RX ADMIN — OXYCODONE HYDROCHLORIDE AND ACETAMINOPHEN 1 TABLET: 7.5; 325 TABLET ORAL at 08:44

## 2022-12-09 RX ADMIN — CARVEDILOL 25 MG: 25 TABLET, FILM COATED ORAL at 08:44

## 2022-12-09 RX ADMIN — OXYCODONE HYDROCHLORIDE AND ACETAMINOPHEN 1 TABLET: 7.5; 325 TABLET ORAL at 01:38

## 2022-12-09 RX ADMIN — OXYCODONE HYDROCHLORIDE AND ACETAMINOPHEN 1 TABLET: 7.5; 325 TABLET ORAL at 17:44

## 2022-12-09 RX ADMIN — AMLODIPINE BESYLATE 10 MG: 10 TABLET ORAL at 08:44

## 2022-12-09 RX ADMIN — HEPARIN SODIUM 3800 UNITS: 1000 INJECTION INTRAVENOUS; SUBCUTANEOUS at 15:11

## 2022-12-09 RX ADMIN — CARVEDILOL 25 MG: 25 TABLET, FILM COATED ORAL at 17:44

## 2022-12-09 RX ADMIN — OXYCODONE HYDROCHLORIDE AND ACETAMINOPHEN 1 TABLET: 7.5; 325 TABLET ORAL at 12:14

## 2022-12-09 RX ADMIN — APIXABAN 2.5 MG: 2.5 TABLET, FILM COATED ORAL at 08:44

## 2022-12-09 RX ADMIN — LEVOTHYROXINE SODIUM 275 MCG: 0.17 TABLET ORAL at 05:29

## 2022-12-09 NOTE — PLAN OF CARE
Problem: Fall Injury Risk  Goal: Absence of Fall and Fall-Related Injury  Outcome: Ongoing, Progressing     Problem: Seizure, Active Management  Goal: Absence of Seizure/Seizure-Related Injury  Outcome: Ongoing, Progressing     Problem: Skin Injury Risk Increased  Goal: Skin Health and Integrity  Outcome: Ongoing, Progressing   Goal Outcome Evaluation:

## 2022-12-09 NOTE — PLAN OF CARE
No significant changes throughout shift. No concerns or complaints noted.     Problem: Fall Injury Risk  Goal: Absence of Fall and Fall-Related Injury  Outcome: Ongoing, Progressing  Intervention: Identify and Manage Contributors  Recent Flowsheet Documentation  Taken 12/8/2022 2145 by Meg Washington RN  Medication Review/Management:   medications reviewed   high-risk medications identified  Self-Care Promotion: independence encouraged  Intervention: Promote Injury-Free Environment  Recent Flowsheet Documentation  Taken 12/9/2022 0430 by Meg Washington RN  Safety Promotion/Fall Prevention: safety round/check completed  Taken 12/9/2022 0240 by Meg Washington RN  Safety Promotion/Fall Prevention: safety round/check completed  Taken 12/9/2022 0035 by Meg Washington RN  Safety Promotion/Fall Prevention: safety round/check completed  Taken 12/8/2022 2210 by Meg Washington RN  Safety Promotion/Fall Prevention: safety round/check completed  Taken 12/8/2022 2145 by Meg Washington RN  Safety Promotion/Fall Prevention:   safety round/check completed   fall prevention program maintained   assistive device/personal items within reach   activity supervised  Taken 12/8/2022 2000 by Meg Washington RN  Safety Promotion/Fall Prevention: safety round/check completed   Goal Outcome Evaluation:

## 2022-12-09 NOTE — DISCHARGE SUMMARY
Patient Name: Zaina Martinez  : 1965  MRN: [id number]    Date of Admission: 2022  Date of Discharge:  2022  Primary Care Physician: Jane Kyle NP      Chief Complaint:   Seizures      Discharge Diagnoses     Active Hospital Problems    Diagnosis  POA   • **Witnessed seizure-like activity (HCC) [R56.9]  Yes   • Ureteral stent present [Z96.0]  Not Applicable   • Closed intertrochanteric fracture of right femur (HCC) [S72.141A]  Yes   • Hyperkalemia [E87.5]  Yes   • Type 2 diabetes mellitus with hyperglycemia, with long-term current use of insulin (HCC) [E11.65, Z79.4]  Not Applicable   • Atrial flutter (HCC) [I48.92]  Yes   • Thrombus of venous dialysis catheter ([facility]) [T82.868A]  Yes   • History of stroke- R MCA s/p TPA with subsequent ICH with debility [Z86.73]  Not Applicable   • History of intracranial hemorrhage [Z86.79]  Not Applicable   • Hyponatremia [E87.1]  Yes   • Anemia due to chronic kidney disease, on chronic dialysis (HCC) [N18.6, D63.1, Z99.2]  Not Applicable   • Chronic diastolic CHF (congestive heart failure) (HCC) [I50.32]  Yes   • ESRD (end stage renal disease) (HCC) [N18.6]  Yes   • Hypothyroidism [E03.9]  Yes      Resolved Hospital Problems   No resolved problems to display.        Hospital Course     Ms. Martinez is a 57 y.o. female with a history of multiple medical issues including prior CVA, CHF, anemia, CAD, hypothyroidism, DM, ESRD on HD, dialysis catheter thrombus, hx ICH, afib on AC, ureteral stent  who presented to [facility] initially complaining of seizure-like activity.  Please see the admitting history and physical for further details.  She was started on Keppra in ED. CTH was without acute ICH. Neurology was consulted. There was no evidence of CNS infection. EEG showed no focality or epileptiform activity. No further seizure-like events occurred. There was no indication for long term AED. Neurology recommended maximizing management of glucose. She  was restarted on lower dose long acting insulin which was stopped previous admission. Levothyroxine was increased last admission for elevated TSH. It is recommended that she follow up with Endocrinology outpatient; she will need repeat TFT's in 4 weeks. Nephrology has followed for electrolyte correction, anemia, dialysis management. She was given a dose of IV ferrlecit, PRBCs, and Procrit this admission. Amlodipine was increased to 10 mg daily for better BP control. Urology evaluated as she was to undergo outpatient ureteral stent removal but has been hospitalized; this will be removed at a later date. Ortho also saw in follow up this hospitalization and performed incision check. Staples were removed. She has been followed by OT/PT who recommend home health. Nephrology has cleared for discharge following dialysis today.  Day of Discharge     Subjective:  No new issues. Continued rt hip discomfort; po meds effective. Eating well. +BM. Agrees with discharge after dialysis    Physical Exam:  Temp:  [97.8 °F (36.6 °C)-98.6 °F (37 °C)] 98.4 °F (36.9 °C)  Heart Rate:  [63-66] 63  Resp:  [16-18] 18  BP: (152-174)/(62-94) 163/94  Body mass index is 23.26 kg/m².  Physical Exam  Vitals and nursing note reviewed.   Constitutional:       General: She is not in acute distress.     Appearance: She is ill-appearing. She is not toxic-appearing.      Comments: Chronically ill appearing   HENT:      Head: Normocephalic.      Mouth/Throat:      Mouth: Mucous membranes are moist.   Eyes:      Conjunctiva/sclera: Conjunctivae normal.   Cardiovascular:      Rate and Rhythm: Normal rate and regular rhythm.   Pulmonary:      Effort: Pulmonary effort is normal. No respiratory distress.      Breath sounds: No wheezing or rales.   Abdominal:      General: Bowel sounds are normal.      Palpations: Abdomen is soft.   Musculoskeletal:      Cervical back: Neck supple.      Right lower leg: No edema.      Left lower leg: No edema.   Skin:      General: Skin is warm and dry.      Comments: Rt hip surgical incisions approximated  Swelling, mild tenderness to mid lateral thigh w/known hematoma   Neurological:      Mental Status: She is alert and oriented to person, place, and time.   Psychiatric:         Mood and Affect: Mood normal.         Behavior: Behavior normal.         Consultants     Consult Orders (all) (From admission, onward)     Start     Ordered    12/08/22 1037  Inpatient Orthopedic Surgery Consult  Once        Specialty:  Orthopedic Surgery  Provider:  Glen Pierce MD    12/08/22 1038    12/06/22 0816  Inpatient Urology Consult  Once        Specialty:  Urology  Provider:  Bryant Phan Jr., MD    12/06/22 0816    12/05/22 0921  Inpatient Diabetes Educator Consult  Once        Provider:  (Not yet assigned)    12/05/22 0920    12/04/22 2356  Inpatient Neurology Consult General  Once        Specialty:  Neurology  Provider:  Adonis Fabian MD    12/04/22 2357 12/04/22 2252  [facility] (on-call MD unless specified) Details  Once        Specialty:  Hospitalist  Provider:  (Not yet assigned)    12/04/22 2251    12/04/22 2252  Nephrology (on -call MD unless specified)  Once        Specialty:  Nephrology  Provider:  (Not yet assigned)    12/04/22 2251              Procedures     LEFT URETEROSCOPY WITH STENT PLACEMENT      Imaging Results (All)     Procedure Component Value Units Date/Time    XR Hip With or Without Pelvis 2 - 3 View Right [[id number]] Collected: 12/08/22 1516     Updated: 12/08/22 1520    Narrative:      XR HIP W OR WO PELVIS 2-3 VIEW RIGHT-  12/08/2022     HISTORY: Follow-up gamma nail placement.     Intramedullary eligio and gamma nail are seen supporting the proximal right  femur. There is mild displacement of the tip of the greater trochanter.  No other displacement is seen. Femoral head articulates with the  acetabulum. Skin staples are seen. Double-J left ureteral stent is seen  as well as corkscrew staples overlying the  left hemipelvis.       Impression:      1. Postoperative changes of the right hip as described.  2. No unexpected findings are noted.     This report was finalized on 12/8/2022 3:17 PM by Dr. Ran Wall M.D.       CT Head Without Contrast [[id number]] Collected: 12/04/22 2214     Updated: 12/04/22 2225    Narrative:      EXAM:  CT HEAD WO CONTRAST-     HISTORY:  New onset seizure, no trauma.     COMPARISON:  CT brain 9/27/2022. MR brain 4/30/2022     TECHNIQUE: Noncontrast images of the brain were obtained. Reformatted  images were reviewed. Radiation dose reduction techniques were utilized,  including automated exposure control and exposure modulation based on  body size.     FINDINGS:       There is mild global parenchymal volume loss. There is linear high  density in the anterior superior left frontal lobe in the region of the  patient's prior intraparenchymal hematoma, which is not significantly  changed from 9/27/2022. No acute intracranial hemorrhage or pathologic  extra-axial collection is identified.  There is no midline shift or mass  effect.  The basal cisterns are patent. There is mild chronic small  vessel ischemic disease, not significantly changed. The grey-white  matter differentiation is maintained. There is calcific intracranial  atherosclerosis.          Impression:      Mild residual linear hypodensity in the left frontal lobe in the region  of the patient's prior intraparenchymal hematoma with no acute  intracranial hemorrhage identified. Mild small vessel ischemic disease,  not significantly changed. Consider further evaluation with MRI.     Discussed with Dr. Nam at 6 10:22 PM.     This report was finalized on 12/4/2022 10:22 PM by Dr. Ananya Desouza M.D.       XR Chest 1 View [[id number]] Collected: 12/04/22 2102     Updated: 12/04/22 2107    Narrative:      XR CHEST 1 VW-     HISTORY:  Seizure, dysrhythmia.     COMPARISON:  Chest radiograph 11/8/2022     FINDINGS:    A single view of  the chest was obtained. There is a right IJ central  line. The cardiac silhouette is upper normal in size. There is central  pulmonary venous congestion with diffuse perihilar predominant airspace  opacities and prominent interstitial opacities, including Kerley B  lines, suggestive of interstitial and alveolar pulmonary edema.  Superimposed pneumonia would be difficult to exclude in the appropriate  clinical setting. There are small bilateral pleural effusions. There is  multilevel degenerative disc disease.     This report was finalized on 12/4/2022 9:03 PM by Dr. Ananya Desouza M.D.           Results for orders placed during the hospital encounter of 05/13/22    Duplex Venous Lower Extremity - Left CAR    Interpretation Summary  · Normal left lower extremity venous duplex scan. Venous pulsatility is noted.    Results for orders placed during the hospital encounter of 11/08/22    Adult Transthoracic Echo Complete W/ Cont if Necessary Per Protocol    Interpretation Summary  •  Left ventricular systolic function is low normal. Left ventricular ejection fraction appears to be 51 - 55%.  •  Left ventricular diastolic function is consistent with (grade II w/high LAP) pseudonormalization.  •  Mildly reduced right ventricular systolic function noted.  •  The right ventricular cavity is mild to moderately dilated.  •  The right atrial cavity is moderately  dilated.  •  Moderate mitral valve regurgitation is present.  •  Severe tricuspid valve regurgitation is present.  •  Estimated right ventricular systolic pressure from tricuspid regurgitation is mildly elevated (35-45 mmHg).  •  There is a large left and right pleural effusion.    Pertinent Labs     Results from last 7 days   Lab Units 12/08/22  1257 12/07/22  0549 12/06/22  0657 12/05/22  0533   WBC 10*3/mm3 5.44 7.45 8.33 7.75   HEMOGLOBIN g/dL 9.9* 7.1* 7.4* 7.3*   PLATELETS 10*3/mm3 173 156 164 145     Results from last 7 days   Lab Units 12/08/22  1257  12/07/22  0549 12/06/22  0657 12/05/22 0533   SODIUM mmol/L 135* 130* 123* 132*   POTASSIUM mmol/L 3.7 5.0 5.8* 5.1   CHLORIDE mmol/L 99 99 88* 94*   CO2 mmol/L 28.0 23.7 24.0 29.0   BUN mg/dL 12 26* 54* 44*   CREATININE mg/dL 1.60* 2.22* 3.70* 2.96*   GLUCOSE mg/dL 101* 138* 184* 168*   EGFR mL/min/1.73 37.5* 25.3* 13.7* 17.9*     Results from last 7 days   Lab Units 12/08/22  1257 12/07/22  0549 12/06/22 0657 12/04/22 1942   ALBUMIN g/dL 3.30* 2.80* 2.70* 3.50   BILIRUBIN mg/dL  --   --   --  0.3   ALK PHOS U/L  --   --   --  211*   AST (SGOT) U/L  --   --   --  15   ALT (SGPT) U/L  --   --   --  <5     Results from last 7 days   Lab Units 12/08/22  1257 12/07/22  0549 12/06/22  0657 12/05/22  0533 12/04/22 1942   CALCIUM mg/dL 8.4* 8.0* 8.2* 7.9* 8.4*   ALBUMIN g/dL 3.30* 2.80* 2.70*  --  3.50   MAGNESIUM mg/dL  --   --   --   --  1.9   PHOSPHORUS mg/dL 2.1* 2.9 3.5  --   --        Results from last 7 days   Lab Units 12/04/22 1942   TROPONIN T ng/mL 0.154*           Invalid input(s): LDLCALC          Test Results Pending at Discharge       Discharge Details        Discharge Medications      New Medications      Instructions Start Date   insulin glargine 100 UNIT/ML injection  Commonly known as: LANBRY MCPHERSONGLEE   3 Units, Subcutaneous, Nightly         Changes to Medications      Instructions Start Date   amLODIPine 10 MG tablet  Commonly known as: NORVASC  What changed:   medication strength  how much to take   10 mg, Oral, Every 24 Hours Scheduled      levothyroxine 75 MCG tablet  Commonly known as: Synthroid  What changed: You were already taking a medication with the same name, and this prescription was added. Make sure you understand how and when to take each.   75 mcg, Oral, Daily, Total dose 275 mcg      levothyroxine 200 MCG tablet  Commonly known as: SYNTHROID, LEVOTHROID  What changed:   medication strength  how much to take  additional instructions   200 mcg, Oral, Every Early Morning, Total dose  275 mcg   Start Date: December 10, 2022        Continue These Medications      Instructions Start Date   apixaban 2.5 MG tablet tablet  Commonly known as: ELIQUIS   2.5 mg, Oral, Every 12 Hours Scheduled      carvedilol 25 MG tablet  Commonly known as: COREG   25 mg, Oral, 2 Times Daily With Meals      folic acid 1 MG tablet  Commonly known as: FOLVITE   1 mg, Oral, Daily      insulin lispro 100 UNIT/ML injection  Commonly known as: ADMELOG   0-7 Units, Subcutaneous, 3 Times Daily Before Meals      lactulose 10 GM/15ML solution  Commonly known as: CHRONULAC   10 g, Oral, 2 Times Daily      melatonin 3 MG tablet   3 mg, Oral, Nightly      miconazole 2 % cream  Commonly known as: MICOTIN   1 application, Topical, Every 12 Hours Scheduled      oxyCODONE-acetaminophen 7.5-325 MG per tablet  Commonly known as: PERCOCET   1 tablet, Oral, Every 4 Hours PRN      pantoprazole 40 MG EC tablet  Commonly known as: PROTONIX   40 mg, Oral, Daily      polyethylene glycol 17 g packet  Commonly known as: MIRALAX   17 g, Oral, Daily PRN      rOPINIRole 1 MG tablet  Commonly known as: REQUIP   2 mg, Oral, Nightly, Take 1 hour before bedtime.      sennosides-docusate 8.6-50 MG per tablet  Commonly known as: PERICOLACE   2 tablets, Oral, Nightly      sertraline 50 MG tablet  Commonly known as: ZOLOFT   150 mg, Oral, Daily             Allergies   Allergen Reactions   • Contrast Dye Confusion   • Ibuprofen Other (See Comments)     Kidney disease   • Prochlorperazine Other (See Comments)     Dystonic reaction            Discharge Disposition:  Home-Health Care [facility]      Discharge Diet:  Diet Order   Procedures   • Diet: Regular/House Diet, Diabetic Diets, Renal Diets, Fluid Restriction (240 mL/tray) Diets; Consistent Carbohydrate; Low Potassium, Low Sodium (2-3g); Other (Specify mL/day) (1200); Texture: Regular Texture (IDDSI 7); Fluid Consistency: Thin (ID...       Discharge Activity:   Activity Instructions     Activity as Tolerated             CODE STATUS:    Code Status and Medical Interventions:   Ordered at: 12/04/22 2358     Code Status (Patient has no pulse and is not breathing):    CPR (Attempt to Resuscitate)     Medical Interventions (Patient has pulse or is breathing):    Full Support       Future Appointments   Date Time Provider Department Center   1/18/2023  3:15 PM Estephania Herrera APRN [facility] None     Additional Instructions for the Follow-ups that You Need to Schedule     Ambulatory Referral to Home Health (Hospital)   As directed      Face to Face Visit Date: 12/9/2022    Follow-up provider for Plan of Care?: I treated the patient in an acute care facility and will not continue treatment after discharge.    Follow-up provider: JANE JACOBO [[id number]]    Reason/Clinical Findings: s/p hip repair    Describe mobility limitations that make leaving home difficult: Decreased strength, endurance, mobility    Nursing/Therapeutic Services Requested: Physical Therapy Occupational Therapy    PT orders: Therapeutic exercise Strengthening Home safety assessment Transfer training Gait Training    Weight Bearing Status: As Tolerated    Occupational orders: Activities of daily living Strengthening Home safety assessment    Frequency: 1 Week 1            Contact information for follow-up providers     Glen Pierce MD Follow up in 3 month(s).    Specialty: Orthopedic Surgery  Contact information:  [address]  [facility] [address]  [place] [id number] [phone]             Jane Jacobo, JOSÉ MANUEL. Schedule an appointment as soon as possible for a visit in 1 week(s).    Specialty: Family Medicine  Contact information:  [address] [facility], [address]  [place] [id number] [phone]                   Contact information for after-discharge care     Home Medical Care     [facility] - [place] .    Service: Home Health Services  Contact information:  [address] [facility] [address]  [facility]  [address]  [phone]                             Additional Instructions for the Follow-ups that You Need to Schedule     Ambulatory Referral to Home Health (Hospital)   As directed      Face to Face Visit Date: 12/9/2022    Follow-up provider for Plan of Care?: I treated the patient in an acute care facility and will not continue treatment after discharge.    Follow-up provider: ILSA JACOBO [[id number]]    Reason/Clinical Findings: s/p hip repair    Describe mobility limitations that make leaving home difficult: Decreased strength, endurance, mobility    Nursing/Therapeutic Services Requested: Physical Therapy Occupational Therapy    PT orders: Therapeutic exercise Strengthening Home safety assessment Transfer training Gait Training    Weight Bearing Status: As Tolerated    Occupational orders: Activities of daily living Strengthening Home safety assessment    Frequency: 1 Week 1           Time Spent on Discharge:  Greater than 30 minutes      RIKA William  [facility]  12/09/22  14:51 EST

## 2022-12-09 NOTE — PROGRESS NOTES
Case Management Discharge Note      Final Note: Pt discharging home with family and continued [facility]. Cinthia Riley LCSW    Provided Post Acute Provider List?: Yes  Post Acute Provider List: Home Health    Selected Continued Care - Admitted Since 12/4/2022     Destination    No services have been selected for the patient.              Durable Medical Equipment    No services have been selected for the patient.              Dialysis/Infusion    No services have been selected for the patient.              Home Medical Care Coordination complete.    Service Provider Selected Services Address Phone Fax Patient Preferred    [facility] [address] [address] [phone] --          Therapy    No services have been selected for the patient.              Community Resources    No services have been selected for the patient.              Community & DME    No services have been selected for the patient.                Selected Continued Care - Prior Encounters Includes continued care and service providers with selected services from prior encounters from 9/5/2022 to 12/9/2022    Discharged on 11/24/2022 Admission date: 11/8/2022 - Discharge disposition: Skilled Nursing Facility (DC - External)    Destination     Service Provider Selected Services Address Phone Fax Patient Preferred    [facility] [address] [address] [phone] --                Discharged on 11/1/2022 Admission date: 10/26/2022 - Discharge disposition: [place] [facility]    Home Medical Care     Service Provider Selected Services Address Phone Fax Patient Preferred    [facility] [address] [address] [phone] --                Discharged on 10/17/2022 Admission date: 10/12/2022 - Discharge  disposition: Home-Health Care [facility]    Home Medical Care     Service Provider Selected Services Address Phone Fax Patient Preferred    [facility] [address] [address] [phone] --                Discharged on 10/6/2022 Admission date: 9/26/2022 - Discharge disposition: Home-Health Care [facility]    Durable Medical Equipment     Service Provider Selected Services Address Phone Fax Patient Preferred    [facility] [address] [facility] [address] [phone] --          Home Medical Care     Service Provider Selected Services Address Phone Fax Patient Preferred    [facility] [address] [address] [phone] --                Discharged on 9/13/2022 Admission date: 9/3/2022 - Discharge disposition: Home-Health Care [facility]    Durable Medical Equipment     Service Provider Selected Services Address Phone Fax Patient Preferred    [facility] [address] [facility] [address] [phone] --          Home Medical Care     Service Provider Selected Services Address Phone Fax Patient Preferred    [facility] [address] [address] [phone] --                    Transportation Services  Private: Car    Final Discharge Disposition Code: 06 - home with home health care

## 2022-12-09 NOTE — NURSING NOTE
hd without incident or c/o. tolerated well. removed 2.5 l. retacrit given. drsg current, cdi. stable upon completion of hd. plan discharge home.

## 2022-12-09 NOTE — PROGRESS NOTES
[facility] Progress Note      Patient Name: Zaina Martinez  : 1965  MRN: [id number]  Primary Care Physician:  Jane Kyle NP  Date of admission: 2022    Subjective     Interval History:   Follow up ESRD.   Doing well overall.  Nausea nausea vomiting.  Dialyzed yesterday due to scheduling issues.  Able to remove 2.5 L of fluid.      Review of Systems:   As noted above    Objective     Vitals:   Temp:  [97.8 °F (36.6 °C)-98.6 °F (37 °C)] 98.4 °F (36.9 °C)  Heart Rate:  [59-66] 63  Resp:  [16-18] 18  BP: (114-186)/() 163/94  Flow (L/min):  [2] 2    Intake/Output Summary (Last 24 hours) at 2022 1046  Last data filed at 2022 0517  Gross per 24 hour   Intake 300 ml   Output 2600 ml   Net -2300 ml       Physical Exam:    General Appearance: alert, frail. Pale.   Skin: warm and dry  HEENT: oral mucosa normal, nonicteric sclera  Neck: supple, no JVD  Lungs: Clear to auscultation. No wheezing .  Heart: RRR, no s3 or rub  Abdomen: soft, nontender, non-distended. + bs  Extremities: no edema.   Neuro: normal speech and mental status     Scheduled Meds:     amLODIPine, 10 mg, Oral, Q24H  apixaban, 2.5 mg, Oral, Q12H  carvedilol, 25 mg, Oral, BID With Meals  epoetin brooke/brooke-epbx, 10,000 Units, Intravenous, Once per day on   folic acid, 1 mg, Oral, Daily  insulin glargine, 5 Units, Subcutaneous, Nightly  insulin lispro, 0-14 Units, Subcutaneous, TID With Meals  lactulose, 10 g, Oral, BID  levothyroxine, 275 mcg, Oral, Q AM  melatonin, 3 mg, Oral, Nightly  miconazole, 1 application, Topical, Q12H  pantoprazole, 40 mg, Oral, Daily  rOPINIRole, 2 mg, Oral, Nightly  sennosides-docusate, 2 tablet, Oral, Nightly  sertraline, 150 mg, Oral, Daily  sodium chloride, 10 mL, Intravenous, Q12H      IV Meds:        Results Reviewed:   I have personally reviewed the results from the time of this admission to 2022 10:46 EST     Results from last 7 days   Lab Units  12/08/22  1257 12/07/22  0549 12/06/22  0657 12/05/22  0533 12/04/22 1942   SODIUM mmol/L 135* 130* 123*   < > 129*   POTASSIUM mmol/L 3.7 5.0 5.8*   < > 5.7*   CHLORIDE mmol/L 99 99 88*   < > 89*   CO2 mmol/L 28.0 23.7 24.0   < > 28.2   BUN mg/dL 12 26* 54*   < > 36*   CREATININE mg/dL 1.60* 2.22* 3.70*   < > 3.08*   CALCIUM mg/dL 8.4* 8.0* 8.2*   < > 8.4*   BILIRUBIN mg/dL  --   --   --   --  0.3   ALK PHOS U/L  --   --   --   --  211*   ALT (SGPT) U/L  --   --   --   --  <5   AST (SGOT) U/L  --   --   --   --  15   GLUCOSE mg/dL 101* 138* 184*   < > 308*    < > = values in this interval not displayed.       Estimated Creatinine Clearance: 35.3 mL/min (A) (by C-G formula based on SCr of 1.6 mg/dL (H)).    Results from last 7 days   Lab Units 12/08/22  1257 12/07/22  0549 12/06/22  0657 12/04/22 1942   MAGNESIUM mg/dL  --   --   --  1.9   PHOSPHORUS mg/dL 2.1* 2.9 3.5  --              Results from last 7 days   Lab Units 12/08/22  1257 12/07/22  0549 12/06/22  0657 12/05/22  0533 12/04/22 1942   WBC 10*3/mm3 5.44 7.45 8.33 7.75 9.81   HEMOGLOBIN g/dL 9.9* 7.1* 7.4* 7.3* 8.2*   PLATELETS 10*3/mm3 173 156 164 145 180       Results from last 7 days   Lab Units 12/04/22 1942   INR  1.27*       Assessment / Plan     ASSESSMENT:  1. ESRD.  Dialysis today to get on her MWF schedule.    2. Left ureteral stent.  eval noted with plans for cysto with stent removal at some point.   3. Anemia CKD.  Although iron sat low, ferritin over 500. Received IV ferrlecit x 1 dose 12/6.    ANDRAE.  4. Seizure activity. EEG negative.   5. DM2 uncontrolled. LHA addressing  6. Hypothyroid on replacement .  TSH persistently elevated despite increase in doses.   7. Prior Afib, flutter. On eliquis and coreg. Rate controlled.   8. HTN. Not controlled.   PLAN:  1.   Patient was dialyzed yesterday due to scheduling issues.  Will dialyze today again in order to keep at schedule  2.  Continue surveillance labs  3.  May discharge home from  nephrology standpoint to follow-up with her outpatient dialysis unit    Reynaldo Sotelo MD  12/09/22  10:46 [facility]    [facility]  [phone]

## 2022-12-10 NOTE — OUTREACH NOTE
Prep Survey    Flowsheet Row Responses   [facility] facility patient discharged from? [place]   Is LACE score < 7 ? No   Emergency Room discharge w/ pulse ox? No   Eligibility Readm Mgmt   Discharge diagnosis Closed intertrochanteric fracture of right femur    Does the patient have one of the following disease processes/diagnoses(primary or secondary)? Other   Does the patient have Home health ordered? Yes   What is the Home health agency?  and [facility]   Is there a DME ordered? No   Prep survey completed? Yes          KERRY FALCON - Registered Nurse

## 2022-12-13 ENCOUNTER — READMISSION MANAGEMENT (OUTPATIENT)
Dept: CALL CENTER | Facility: HOSPITAL | Age: 57
End: 2022-12-13

## 2022-12-13 NOTE — OUTREACH NOTE
Medical Week 1 Survey    Flowsheet Row Responses   [facility] patient discharged from? [place]   Does the patient have one of the following disease processes/diagnoses(primary or secondary)? Other   Week 1 attempt successful? Yes   Call start time 1318   Call end time 1331   General alerts for this patient HD--M,W, FR   Discharge diagnosis Closed intertrochanteric fracture of right femur    Meds reviewed with patient/caregiver? Yes   Is the patient having any side effects they believe may be caused by any medication additions or changes? No   Does the patient have all medications ordered at discharge? Yes   Is the patient taking all medications as directed (includes completed medication regime)? Yes   Does the patient have a primary care provider?  Yes   Does the patient have an appointment with their PCP within 7 days of discharge? Yes   Has the patient kept scheduled appointments due by today? N/A   Comments States nurse practioner is suppose to be coming to her home today.  Phone # given to Dr. Pierce to make a follow up   What is the Home health agency?  [facility]    Has home health visited the patient within 72 hours of discharge? Yes   Psychosocial issues? No   Did the patient receive a copy of their discharge instructions? Yes   Nursing interventions Reviewed instructions with patient   What is the patient's perception of their health status since discharge? New symptoms unrelated to diagnosis   Is the patient/caregiver able to teach back signs and symptoms related to disease process for when to call PCP? Yes   Is the patient/caregiver able to teach back signs and symptoms related to disease process for when to call 911? Yes   Is the patient/caregiver able to teach back the hierarchy of who to call/visit for symptoms/problems? PCP, Specialist, Home health nurse, Urgent Care, ED, 911 Yes   Additional teach back comments States she has knots on her injured leg, alot of pain with swelling.   Unsure if it is red due to her eyesight is bad.  Nurse practioner should be coming today.  Advised to contact ortho  regarding this and pain control.  Explained she may possible need to be evaluated in ED.  She will contact orth regarding new symptoms.    Week 1 call completed? Yes   Graduated/Revoked comments Pt to contact ortho  regarding new symptoms and pain control.          IJEOMA ARMAS - Licensed Nurse

## 2022-12-21 ENCOUNTER — READMISSION MANAGEMENT (OUTPATIENT)
Dept: CALL CENTER | Facility: HOSPITAL | Age: 57
End: 2022-12-21

## 2022-12-21 NOTE — OUTREACH NOTE
Medical Week 2 Survey    Flowsheet Row Responses   [facility] patient discharged from? [place]   Does the patient have one of the following disease processes/diagnoses(primary or secondary)? Other   Week 2 attempt successful? No   Unsuccessful attempts Attempt 2          PEPE LUCERO - Registered Nurse

## 2022-12-26 ENCOUNTER — APPOINTMENT (OUTPATIENT)
Dept: CT IMAGING | Facility: HOSPITAL | Age: 57
DRG: 270 | End: 2022-12-26
Payer: MEDICARE

## 2022-12-26 ENCOUNTER — HOSPITAL ENCOUNTER (INPATIENT)
Facility: HOSPITAL | Age: 57
LOS: 34 days | Discharge: HOME HEALTH | DRG: 270 | End: 2023-01-29
Attending: EMERGENCY MEDICINE | Admitting: STUDENT IN AN ORGANIZED HEALTH CARE EDUCATION/TRAINING PROGRAM
Payer: MEDICARE

## 2022-12-26 ENCOUNTER — APPOINTMENT (OUTPATIENT)
Dept: GENERAL RADIOLOGY | Facility: HOSPITAL | Age: 57
DRG: 270 | End: 2022-12-26
Payer: MEDICARE

## 2022-12-26 DIAGNOSIS — R53.1 GENERALIZED WEAKNESS: ICD-10-CM

## 2022-12-26 DIAGNOSIS — N18.6 ESRD (END STAGE RENAL DISEASE) ON DIALYSIS: ICD-10-CM

## 2022-12-26 DIAGNOSIS — J96.02 ACUTE RESPIRATORY FAILURE WITH HYPERCAPNIA: Primary | ICD-10-CM

## 2022-12-26 DIAGNOSIS — Z99.2 ESRD (END STAGE RENAL DISEASE) ON DIALYSIS: ICD-10-CM

## 2022-12-26 DIAGNOSIS — Z79.899 POLYPHARMACY: ICD-10-CM

## 2022-12-26 PROBLEM — F11.90 OPIOID USE: Status: ACTIVE | Noted: 2022-12-26

## 2022-12-26 LAB
ALBUMIN SERPL-MCNC: 3.1 G/DL (ref 3.5–5.2)
ALBUMIN/GLOB SERPL: 1.1 G/DL
ALP SERPL-CCNC: 174 U/L (ref 39–117)
ALT SERPL W P-5'-P-CCNC: 9 U/L (ref 1–33)
ANION GAP SERPL CALCULATED.3IONS-SCNC: 11.4 MMOL/L (ref 5–15)
ARTERIAL PATENCY WRIST A: POSITIVE
AST SERPL-CCNC: 26 U/L (ref 1–32)
ATMOSPHERIC PRESS: 753.1 MMHG
B PARAPERT DNA SPEC QL NAA+PROBE: NOT DETECTED
B PERT DNA SPEC QL NAA+PROBE: NOT DETECTED
BASE EXCESS BLDA CALC-SCNC: 5 MMOL/L (ref 0–2)
BASOPHILS # BLD AUTO: 0.05 10*3/MM3 (ref 0–0.2)
BASOPHILS NFR BLD AUTO: 0.3 % (ref 0–1.5)
BDY SITE: ABNORMAL
BILIRUB SERPL-MCNC: 0.2 MG/DL (ref 0–1.2)
BUN SERPL-MCNC: 31 MG/DL (ref 6–20)
BUN/CREAT SERPL: 8.6 (ref 7–25)
C PNEUM DNA NPH QL NAA+NON-PROBE: NOT DETECTED
CALCIUM SPEC-SCNC: 8 MG/DL (ref 8.6–10.5)
CHLORIDE SERPL-SCNC: 93 MMOL/L (ref 98–107)
CO2 SERPL-SCNC: 28.6 MMOL/L (ref 22–29)
CREAT SERPL-MCNC: 3.6 MG/DL (ref 0.57–1)
D-LACTATE SERPL-SCNC: 1.3 MMOL/L (ref 0.5–2)
DEPRECATED RDW RBC AUTO: 50.8 FL (ref 37–54)
EGFRCR SERPLBLD CKD-EPI 2021: 14.2 ML/MIN/1.73
EOSINOPHIL # BLD AUTO: 0.05 10*3/MM3 (ref 0–0.4)
EOSINOPHIL NFR BLD AUTO: 0.3 % (ref 0.3–6.2)
ERYTHROCYTE [DISTWIDTH] IN BLOOD BY AUTOMATED COUNT: 16.2 % (ref 12.3–15.4)
FLUAV SUBTYP SPEC NAA+PROBE: NOT DETECTED
FLUBV RNA ISLT QL NAA+PROBE: NOT DETECTED
GAS FLOW AIRWAY: 2 LPM
GLOBULIN UR ELPH-MCNC: 2.9 GM/DL
GLUCOSE BLDC GLUCOMTR-MCNC: 119 MG/DL (ref 70–130)
GLUCOSE SERPL-MCNC: 163 MG/DL (ref 65–99)
HADV DNA SPEC NAA+PROBE: NOT DETECTED
HCO3 BLDA-SCNC: 32.9 MMOL/L (ref 22–28)
HCOV 229E RNA SPEC QL NAA+PROBE: NOT DETECTED
HCOV HKU1 RNA SPEC QL NAA+PROBE: NOT DETECTED
HCOV NL63 RNA SPEC QL NAA+PROBE: NOT DETECTED
HCOV OC43 RNA SPEC QL NAA+PROBE: NOT DETECTED
HCT VFR BLD AUTO: 26.9 % (ref 34–46.6)
HCT VFR BLD AUTO: 29 % (ref 34–46.6)
HGB BLD-MCNC: 8.6 G/DL (ref 12–15.9)
HGB BLD-MCNC: 9.5 G/DL (ref 12–15.9)
HMPV RNA NPH QL NAA+NON-PROBE: NOT DETECTED
HPIV1 RNA ISLT QL NAA+PROBE: NOT DETECTED
HPIV2 RNA SPEC QL NAA+PROBE: NOT DETECTED
HPIV3 RNA NPH QL NAA+PROBE: NOT DETECTED
HPIV4 P GENE NPH QL NAA+PROBE: NOT DETECTED
IMM GRANULOCYTES # BLD AUTO: 0.14 10*3/MM3 (ref 0–0.05)
IMM GRANULOCYTES NFR BLD AUTO: 0.8 % (ref 0–0.5)
LYMPHOCYTES # BLD AUTO: 1.3 10*3/MM3 (ref 0.7–3.1)
LYMPHOCYTES NFR BLD AUTO: 7.4 % (ref 19.6–45.3)
M PNEUMO IGG SER IA-ACNC: NOT DETECTED
MCH RBC QN AUTO: 28.3 PG (ref 26.6–33)
MCHC RBC AUTO-ENTMCNC: 32 G/DL (ref 31.5–35.7)
MCV RBC AUTO: 88.5 FL (ref 79–97)
MODALITY: ABNORMAL
MONOCYTES # BLD AUTO: 1.29 10*3/MM3 (ref 0.1–0.9)
MONOCYTES NFR BLD AUTO: 7.4 % (ref 5–12)
NEUTROPHILS NFR BLD AUTO: 14.64 10*3/MM3 (ref 1.7–7)
NEUTROPHILS NFR BLD AUTO: 83.8 % (ref 42.7–76)
NRBC BLD AUTO-RTO: 0.1 /100 WBC (ref 0–0.2)
NT-PROBNP SERPL-MCNC: ABNORMAL PG/ML (ref 0–900)
PCO2 BLDA: 68 MM HG (ref 35–45)
PH BLDA: 7.29 PH UNITS (ref 7.35–7.45)
PLATELET # BLD AUTO: 221 10*3/MM3 (ref 140–450)
PMV BLD AUTO: 10.2 FL (ref 6–12)
PO2 BLDA: 87.7 MM HG (ref 80–100)
POTASSIUM SERPL-SCNC: 4.3 MMOL/L (ref 3.5–5.2)
PROCALCITONIN SERPL-MCNC: 0.24 NG/ML (ref 0–0.25)
PROT SERPL-MCNC: 6 G/DL (ref 6–8.5)
QT INTERVAL: 514 MS
RBC # BLD AUTO: 3.04 10*6/MM3 (ref 3.77–5.28)
RHINOVIRUS RNA SPEC NAA+PROBE: NOT DETECTED
RSV RNA NPH QL NAA+NON-PROBE: NOT DETECTED
SAO2 % BLDCOA: 95.1 % (ref 92–99)
SARS-COV-2 RNA NPH QL NAA+NON-PROBE: NOT DETECTED
SODIUM SERPL-SCNC: 133 MMOL/L (ref 136–145)
TOTAL RATE: 16 BREATHS/MINUTE
TROPONIN T SERPL-MCNC: 0.15 NG/ML (ref 0–0.03)
WBC NRBC COR # BLD: 17.47 10*3/MM3 (ref 3.4–10.8)

## 2022-12-26 PROCEDURE — 85018 HEMOGLOBIN: CPT | Performed by: NURSE PRACTITIONER

## 2022-12-26 PROCEDURE — 85025 COMPLETE CBC W/AUTO DIFF WBC: CPT | Performed by: EMERGENCY MEDICINE

## 2022-12-26 PROCEDURE — 71045 X-RAY EXAM CHEST 1 VIEW: CPT

## 2022-12-26 PROCEDURE — 36415 COLL VENOUS BLD VENIPUNCTURE: CPT | Performed by: NURSE PRACTITIONER

## 2022-12-26 PROCEDURE — 25010000002 NALOXONE PER 1 MG: Performed by: EMERGENCY MEDICINE

## 2022-12-26 PROCEDURE — 99285 EMERGENCY DEPT VISIT HI MDM: CPT

## 2022-12-26 PROCEDURE — 93005 ELECTROCARDIOGRAM TRACING: CPT | Performed by: EMERGENCY MEDICINE

## 2022-12-26 PROCEDURE — 85014 HEMATOCRIT: CPT | Performed by: NURSE PRACTITIONER

## 2022-12-26 PROCEDURE — 87040 BLOOD CULTURE FOR BACTERIA: CPT | Performed by: EMERGENCY MEDICINE

## 2022-12-26 PROCEDURE — 36600 WITHDRAWAL OF ARTERIAL BLOOD: CPT

## 2022-12-26 PROCEDURE — 84145 PROCALCITONIN (PCT): CPT | Performed by: EMERGENCY MEDICINE

## 2022-12-26 PROCEDURE — 82962 GLUCOSE BLOOD TEST: CPT

## 2022-12-26 PROCEDURE — 82803 BLOOD GASES ANY COMBINATION: CPT

## 2022-12-26 PROCEDURE — 84484 ASSAY OF TROPONIN QUANT: CPT | Performed by: EMERGENCY MEDICINE

## 2022-12-26 PROCEDURE — 70450 CT HEAD/BRAIN W/O DYE: CPT

## 2022-12-26 PROCEDURE — 80053 COMPREHEN METABOLIC PANEL: CPT | Performed by: EMERGENCY MEDICINE

## 2022-12-26 PROCEDURE — 93010 ELECTROCARDIOGRAM REPORT: CPT | Performed by: INTERNAL MEDICINE

## 2022-12-26 PROCEDURE — 83605 ASSAY OF LACTIC ACID: CPT | Performed by: EMERGENCY MEDICINE

## 2022-12-26 PROCEDURE — 25010000002 FUROSEMIDE PER 20 MG: Performed by: EMERGENCY MEDICINE

## 2022-12-26 PROCEDURE — 0202U NFCT DS 22 TRGT SARS-COV-2: CPT | Performed by: NURSE PRACTITIONER

## 2022-12-26 PROCEDURE — 83880 ASSAY OF NATRIURETIC PEPTIDE: CPT | Performed by: EMERGENCY MEDICINE

## 2022-12-26 RX ORDER — DEXTROSE MONOHYDRATE 25 G/50ML
25 INJECTION, SOLUTION INTRAVENOUS
Status: DISCONTINUED | OUTPATIENT
Start: 2022-12-26 | End: 2023-01-29 | Stop reason: HOSPADM

## 2022-12-26 RX ORDER — SODIUM CHLORIDE 0.9 % (FLUSH) 0.9 %
10 SYRINGE (ML) INJECTION AS NEEDED
Status: DISCONTINUED | OUTPATIENT
Start: 2022-12-26 | End: 2023-01-01

## 2022-12-26 RX ORDER — NICOTINE POLACRILEX 4 MG
15 LOZENGE BUCCAL
Status: DISCONTINUED | OUTPATIENT
Start: 2022-12-26 | End: 2023-01-29 | Stop reason: HOSPADM

## 2022-12-26 RX ORDER — FUROSEMIDE 10 MG/ML
100 INJECTION INTRAMUSCULAR; INTRAVENOUS ONCE
Status: COMPLETED | OUTPATIENT
Start: 2022-12-26 | End: 2022-12-26

## 2022-12-26 RX ORDER — ACETAMINOPHEN 160 MG/5ML
650 SOLUTION ORAL EVERY 4 HOURS PRN
Status: DISCONTINUED | OUTPATIENT
Start: 2022-12-26 | End: 2023-01-29 | Stop reason: HOSPADM

## 2022-12-26 RX ORDER — NALOXONE HCL 0.4 MG/ML
0.8 VIAL (ML) INJECTION ONCE
Status: COMPLETED | OUTPATIENT
Start: 2022-12-26 | End: 2022-12-26

## 2022-12-26 RX ORDER — SODIUM CHLORIDE 0.9 % (FLUSH) 0.9 %
10 SYRINGE (ML) INJECTION AS NEEDED
Status: DISCONTINUED | OUTPATIENT
Start: 2022-12-26 | End: 2023-01-29 | Stop reason: HOSPADM

## 2022-12-26 RX ORDER — NITROGLYCERIN 0.4 MG/1
0.4 TABLET SUBLINGUAL
Status: DISCONTINUED | OUTPATIENT
Start: 2022-12-26 | End: 2023-01-29 | Stop reason: HOSPADM

## 2022-12-26 RX ORDER — ACETAMINOPHEN 650 MG/1
650 SUPPOSITORY RECTAL EVERY 4 HOURS PRN
Status: DISCONTINUED | OUTPATIENT
Start: 2022-12-26 | End: 2023-01-29 | Stop reason: HOSPADM

## 2022-12-26 RX ORDER — SODIUM CHLORIDE 0.9 % (FLUSH) 0.9 %
10 SYRINGE (ML) INJECTION EVERY 12 HOURS SCHEDULED
Status: DISCONTINUED | OUTPATIENT
Start: 2022-12-26 | End: 2023-01-29 | Stop reason: HOSPADM

## 2022-12-26 RX ORDER — INSULIN LISPRO 100 [IU]/ML
0-7 INJECTION, SOLUTION INTRAVENOUS; SUBCUTANEOUS
Status: DISCONTINUED | OUTPATIENT
Start: 2022-12-26 | End: 2023-01-13

## 2022-12-26 RX ORDER — ONDANSETRON 2 MG/ML
4 INJECTION INTRAMUSCULAR; INTRAVENOUS EVERY 6 HOURS PRN
Status: DISCONTINUED | OUTPATIENT
Start: 2022-12-26 | End: 2023-01-29 | Stop reason: HOSPADM

## 2022-12-26 RX ORDER — SODIUM CHLORIDE 9 MG/ML
40 INJECTION, SOLUTION INTRAVENOUS AS NEEDED
Status: DISCONTINUED | OUTPATIENT
Start: 2022-12-26 | End: 2023-01-29 | Stop reason: HOSPADM

## 2022-12-26 RX ORDER — ACETAMINOPHEN 325 MG/1
650 TABLET ORAL EVERY 4 HOURS PRN
Status: DISCONTINUED | OUTPATIENT
Start: 2022-12-26 | End: 2023-01-29 | Stop reason: HOSPADM

## 2022-12-26 RX ADMIN — NALXONE HYDROCHLORIDE 0.8 MG: 0.4 INJECTION INTRAMUSCULAR; INTRAVENOUS; SUBCUTANEOUS at 17:52

## 2022-12-26 RX ADMIN — Medication 10 ML: at 21:38

## 2022-12-26 RX ADMIN — FUROSEMIDE 100 MG: 100 INJECTION, SOLUTION INTRAMUSCULAR; INTRAVENOUS at 22:39

## 2022-12-27 ENCOUNTER — READMISSION MANAGEMENT (OUTPATIENT)
Dept: CALL CENTER | Facility: HOSPITAL | Age: 57
End: 2022-12-27

## 2022-12-27 LAB
ALBUMIN SERPL-MCNC: 3 G/DL (ref 3.5–5.2)
ALBUMIN/GLOB SERPL: 1.1 G/DL
ALP SERPL-CCNC: 155 U/L (ref 39–117)
ALT SERPL W P-5'-P-CCNC: 6 U/L (ref 1–33)
ANION GAP SERPL CALCULATED.3IONS-SCNC: 13.3 MMOL/L (ref 5–15)
ARTERIAL PATENCY WRIST A: ABNORMAL
AST SERPL-CCNC: 17 U/L (ref 1–32)
ATMOSPHERIC PRESS: 759.4 MMHG
BASE EXCESS BLDA CALC-SCNC: 3.7 MMOL/L (ref 0–2)
BASOPHILS # BLD AUTO: 0.04 10*3/MM3 (ref 0–0.2)
BASOPHILS NFR BLD AUTO: 0.3 % (ref 0–1.5)
BDY SITE: ABNORMAL
BILIRUB SERPL-MCNC: 0.3 MG/DL (ref 0–1.2)
BUN SERPL-MCNC: 34 MG/DL (ref 6–20)
BUN/CREAT SERPL: 8.6 (ref 7–25)
CALCIUM SPEC-SCNC: 7.9 MG/DL (ref 8.6–10.5)
CHLORIDE SERPL-SCNC: 92 MMOL/L (ref 98–107)
CO2 SERPL-SCNC: 26.7 MMOL/L (ref 22–29)
CREAT SERPL-MCNC: 3.96 MG/DL (ref 0.57–1)
DEPRECATED RDW RBC AUTO: 52.7 FL (ref 37–54)
EGFRCR SERPLBLD CKD-EPI 2021: 12.6 ML/MIN/1.73
EOSINOPHIL # BLD AUTO: 0.09 10*3/MM3 (ref 0–0.4)
EOSINOPHIL NFR BLD AUTO: 0.6 % (ref 0.3–6.2)
ERYTHROCYTE [DISTWIDTH] IN BLOOD BY AUTOMATED COUNT: 15.9 % (ref 12.3–15.4)
GAS FLOW AIRWAY: 2 LPM
GLOBULIN UR ELPH-MCNC: 2.8 GM/DL
GLUCOSE BLDC GLUCOMTR-MCNC: 110 MG/DL (ref 70–130)
GLUCOSE BLDC GLUCOMTR-MCNC: 152 MG/DL (ref 70–130)
GLUCOSE BLDC GLUCOMTR-MCNC: 220 MG/DL (ref 70–130)
GLUCOSE BLDC GLUCOMTR-MCNC: 230 MG/DL (ref 70–130)
GLUCOSE SERPL-MCNC: 170 MG/DL (ref 65–99)
HBV SURFACE AB SER RIA-ACNC: NORMAL
HCO3 BLDA-SCNC: 28.1 MMOL/L (ref 22–28)
HCT VFR BLD AUTO: 25.7 % (ref 34–46.6)
HCT VFR BLD AUTO: 25.7 % (ref 34–46.6)
HGB BLD-MCNC: 8.3 G/DL (ref 12–15.9)
HGB BLD-MCNC: 8.3 G/DL (ref 12–15.9)
IMM GRANULOCYTES # BLD AUTO: 0.09 10*3/MM3 (ref 0–0.05)
IMM GRANULOCYTES NFR BLD AUTO: 0.6 % (ref 0–0.5)
LYMPHOCYTES # BLD AUTO: 1.16 10*3/MM3 (ref 0.7–3.1)
LYMPHOCYTES NFR BLD AUTO: 7.9 % (ref 19.6–45.3)
MCH RBC QN AUTO: 29.2 PG (ref 26.6–33)
MCHC RBC AUTO-ENTMCNC: 32.3 G/DL (ref 31.5–35.7)
MCV RBC AUTO: 90.5 FL (ref 79–97)
MODALITY: ABNORMAL
MONOCYTES # BLD AUTO: 1.03 10*3/MM3 (ref 0.1–0.9)
MONOCYTES NFR BLD AUTO: 7 % (ref 5–12)
NEUTROPHILS NFR BLD AUTO: 12.29 10*3/MM3 (ref 1.7–7)
NEUTROPHILS NFR BLD AUTO: 83.6 % (ref 42.7–76)
NRBC BLD AUTO-RTO: 0.1 /100 WBC (ref 0–0.2)
PCO2 BLDA: 41 MM HG (ref 35–45)
PH BLDA: 7.44 PH UNITS (ref 7.35–7.45)
PHOSPHATE SERPL-MCNC: 5 MG/DL (ref 2.5–4.5)
PLATELET # BLD AUTO: 153 10*3/MM3 (ref 140–450)
PMV BLD AUTO: 10.8 FL (ref 6–12)
PO2 BLDA: 85.2 MM HG (ref 80–100)
POTASSIUM SERPL-SCNC: 5 MMOL/L (ref 3.5–5.2)
PROT SERPL-MCNC: 5.8 G/DL (ref 6–8.5)
RBC # BLD AUTO: 2.84 10*6/MM3 (ref 3.77–5.28)
SAO2 % BLDCOA: 96.8 % (ref 92–99)
SODIUM SERPL-SCNC: 132 MMOL/L (ref 136–145)
TOTAL RATE: 18 BREATHS/MINUTE
WBC NRBC COR # BLD: 14.7 10*3/MM3 (ref 3.4–10.8)

## 2022-12-27 PROCEDURE — 85014 HEMATOCRIT: CPT | Performed by: NURSE PRACTITIONER

## 2022-12-27 PROCEDURE — 85018 HEMOGLOBIN: CPT | Performed by: NURSE PRACTITIONER

## 2022-12-27 PROCEDURE — 85025 COMPLETE CBC W/AUTO DIFF WBC: CPT | Performed by: NURSE PRACTITIONER

## 2022-12-27 PROCEDURE — 84100 ASSAY OF PHOSPHORUS: CPT | Performed by: INTERNAL MEDICINE

## 2022-12-27 PROCEDURE — 5A1D70Z PERFORMANCE OF URINARY FILTRATION, INTERMITTENT, LESS THAN 6 HOURS PER DAY: ICD-10-PCS | Performed by: INTERNAL MEDICINE

## 2022-12-27 PROCEDURE — 82962 GLUCOSE BLOOD TEST: CPT

## 2022-12-27 PROCEDURE — 87102 FUNGUS ISOLATION CULTURE: CPT | Performed by: HOSPITALIST

## 2022-12-27 PROCEDURE — 82803 BLOOD GASES ANY COMBINATION: CPT

## 2022-12-27 PROCEDURE — 97162 PT EVAL MOD COMPLEX 30 MIN: CPT

## 2022-12-27 PROCEDURE — 80053 COMPREHEN METABOLIC PANEL: CPT | Performed by: NURSE PRACTITIONER

## 2022-12-27 PROCEDURE — 63710000001 INSULIN LISPRO (HUMAN) PER 5 UNITS: Performed by: NURSE PRACTITIONER

## 2022-12-27 PROCEDURE — 86706 HEP B SURFACE ANTIBODY: CPT | Performed by: INTERNAL MEDICINE

## 2022-12-27 PROCEDURE — 25010000002 HEPARIN (PORCINE) PER 1000 UNITS: Performed by: INTERNAL MEDICINE

## 2022-12-27 PROCEDURE — 25010000002 EPOETIN ALFA-EPBX 10000 UNIT/ML SOLUTION: Performed by: INTERNAL MEDICINE

## 2022-12-27 PROCEDURE — 97530 THERAPEUTIC ACTIVITIES: CPT

## 2022-12-27 PROCEDURE — 36600 WITHDRAWAL OF ARTERIAL BLOOD: CPT

## 2022-12-27 RX ORDER — FOLIC ACID 1 MG/1
1 TABLET ORAL DAILY
Status: DISCONTINUED | OUTPATIENT
Start: 2022-12-27 | End: 2023-01-29 | Stop reason: HOSPADM

## 2022-12-27 RX ORDER — AMLODIPINE BESYLATE 10 MG/1
10 TABLET ORAL
Status: DISCONTINUED | OUTPATIENT
Start: 2022-12-27 | End: 2022-12-31

## 2022-12-27 RX ORDER — LEVOTHYROXINE SODIUM 0.07 MG/1
75 TABLET ORAL
Status: DISCONTINUED | OUTPATIENT
Start: 2022-12-27 | End: 2022-12-28

## 2022-12-27 RX ORDER — AMLODIPINE BESYLATE 10 MG/1
10 TABLET ORAL
Status: DISCONTINUED | OUTPATIENT
Start: 2022-12-27 | End: 2022-12-27

## 2022-12-27 RX ORDER — HEPARIN SODIUM 1000 [USP'U]/ML
4000 INJECTION, SOLUTION INTRAVENOUS; SUBCUTANEOUS AS NEEDED
Status: DISCONTINUED | OUTPATIENT
Start: 2022-12-27 | End: 2023-01-29 | Stop reason: HOSPADM

## 2022-12-27 RX ORDER — AMOXICILLIN 250 MG
2 CAPSULE ORAL NIGHTLY
Status: DISCONTINUED | OUTPATIENT
Start: 2022-12-27 | End: 2023-01-03

## 2022-12-27 RX ORDER — HYDROCODONE BITARTRATE AND ACETAMINOPHEN 5; 325 MG/1; MG/1
1 TABLET ORAL ONCE
Status: COMPLETED | OUTPATIENT
Start: 2022-12-27 | End: 2022-12-27

## 2022-12-27 RX ORDER — CARVEDILOL 25 MG/1
25 TABLET ORAL 2 TIMES DAILY WITH MEALS
Status: DISCONTINUED | OUTPATIENT
Start: 2022-12-27 | End: 2022-12-31

## 2022-12-27 RX ORDER — AMLODIPINE BESYLATE 10 MG/1
10 TABLET ORAL
Status: DISCONTINUED | OUTPATIENT
Start: 2022-12-28 | End: 2022-12-27

## 2022-12-27 RX ORDER — PANTOPRAZOLE SODIUM 40 MG/1
40 TABLET, DELAYED RELEASE ORAL DAILY
Status: DISCONTINUED | OUTPATIENT
Start: 2022-12-27 | End: 2023-01-13

## 2022-12-27 RX ORDER — LACTULOSE 10 G/15ML
10 SOLUTION ORAL 2 TIMES DAILY
Status: DISCONTINUED | OUTPATIENT
Start: 2022-12-27 | End: 2023-01-03

## 2022-12-27 RX ORDER — POLYETHYLENE GLYCOL 3350 17 G/17G
17 POWDER, FOR SOLUTION ORAL DAILY PRN
Status: DISCONTINUED | OUTPATIENT
Start: 2022-12-27 | End: 2023-01-09

## 2022-12-27 RX ORDER — LEVOTHYROXINE SODIUM 0.1 MG/1
200 TABLET ORAL
Status: DISCONTINUED | OUTPATIENT
Start: 2022-12-27 | End: 2022-12-28

## 2022-12-27 RX ADMIN — APIXABAN 2.5 MG: 2.5 TABLET, FILM COATED ORAL at 10:04

## 2022-12-27 RX ADMIN — DOCUSATE SODIUM 50MG AND SENNOSIDES 8.6MG 2 TABLET: 8.6; 5 TABLET, FILM COATED ORAL at 01:42

## 2022-12-27 RX ADMIN — EPOETIN ALFA-EPBX 10000 UNITS: 10000 INJECTION, SOLUTION INTRAVENOUS; SUBCUTANEOUS at 10:08

## 2022-12-27 RX ADMIN — ACETAMINOPHEN 650 MG: 325 TABLET, FILM COATED ORAL at 05:03

## 2022-12-27 RX ADMIN — ACETAMINOPHEN 650 MG: 325 TABLET, FILM COATED ORAL at 17:51

## 2022-12-27 RX ADMIN — Medication 10 ML: at 21:12

## 2022-12-27 RX ADMIN — INSULIN LISPRO 2 UNITS: 100 INJECTION, SOLUTION INTRAVENOUS; SUBCUTANEOUS at 17:51

## 2022-12-27 RX ADMIN — FOLIC ACID 1 MG: 1 TABLET ORAL at 10:06

## 2022-12-27 RX ADMIN — LEVOTHYROXINE SODIUM 200 MCG: 0.1 TABLET ORAL at 05:02

## 2022-12-27 RX ADMIN — Medication 10 ML: at 10:08

## 2022-12-27 RX ADMIN — APIXABAN 2.5 MG: 2.5 TABLET, FILM COATED ORAL at 21:11

## 2022-12-27 RX ADMIN — ACETAMINOPHEN 650 MG: 325 TABLET, FILM COATED ORAL at 01:42

## 2022-12-27 RX ADMIN — CARVEDILOL 25 MG: 25 TABLET, FILM COATED ORAL at 10:06

## 2022-12-27 RX ADMIN — AMLODIPINE BESYLATE 10 MG: 10 TABLET ORAL at 10:06

## 2022-12-27 RX ADMIN — HEPARIN SODIUM 4000 UNITS: 1000 INJECTION INTRAVENOUS; SUBCUTANEOUS at 18:47

## 2022-12-27 RX ADMIN — INSULIN LISPRO 3 UNITS: 100 INJECTION, SOLUTION INTRAVENOUS; SUBCUTANEOUS at 11:31

## 2022-12-27 RX ADMIN — HYDROCODONE BITARTRATE AND ACETAMINOPHEN 1 TABLET: 5; 325 TABLET ORAL at 21:53

## 2022-12-27 RX ADMIN — LACTULOSE 10 G: 10 SOLUTION ORAL at 10:04

## 2022-12-27 RX ADMIN — ACETAMINOPHEN 650 MG: 325 TABLET, FILM COATED ORAL at 21:54

## 2022-12-27 RX ADMIN — ACETAMINOPHEN 650 MG: 325 TABLET, FILM COATED ORAL at 10:11

## 2022-12-27 RX ADMIN — LEVOTHYROXINE SODIUM 75 MCG: 0.07 TABLET ORAL at 05:02

## 2022-12-27 RX ADMIN — PANTOPRAZOLE SODIUM 40 MG: 40 TABLET, DELAYED RELEASE ORAL at 10:09

## 2022-12-27 RX ADMIN — LACTULOSE 10 G: 10 SOLUTION ORAL at 03:08

## 2022-12-27 NOTE — OUTREACH NOTE
Medical Week 3 Survey    Flowsheet Row Responses   [facility] patient discharged from? [place]   Does the patient have one of the following disease processes/diagnoses(primary or secondary)? Other   Week 3 attempt successful? No   Unsuccessful attempts Attempt 1   Revoke Readmitted          JESSE PAINTING - Registered Nurse

## 2022-12-28 ENCOUNTER — HOME HEALTH ADMISSION (OUTPATIENT)
Dept: HOME HEALTH SERVICES | Facility: HOME HEALTHCARE | Age: 57
End: 2022-12-28

## 2022-12-28 ENCOUNTER — APPOINTMENT (OUTPATIENT)
Dept: CT IMAGING | Facility: HOSPITAL | Age: 57
DRG: 270 | End: 2022-12-28
Payer: MEDICARE

## 2022-12-28 ENCOUNTER — APPOINTMENT (OUTPATIENT)
Dept: CARDIOLOGY | Facility: HOSPITAL | Age: 57
DRG: 270 | End: 2022-12-28
Payer: MEDICARE

## 2022-12-28 LAB
ALBUMIN SERPL-MCNC: 2.9 G/DL (ref 3.5–5.2)
ALBUMIN SERPL-MCNC: 2.9 G/DL (ref 3.5–5.2)
ALP SERPL-CCNC: 180 U/L (ref 39–117)
ALT SERPL W P-5'-P-CCNC: 8 U/L (ref 1–33)
ANION GAP SERPL CALCULATED.3IONS-SCNC: 10 MMOL/L (ref 5–15)
ANION GAP SERPL CALCULATED.3IONS-SCNC: 10.8 MMOL/L (ref 5–15)
AST SERPL-CCNC: 16 U/L (ref 1–32)
BH CV LOWER VASCULAR LEFT COMMON FEMORAL AUGMENT: NORMAL
BH CV LOWER VASCULAR LEFT COMMON FEMORAL COMPETENT: NORMAL
BH CV LOWER VASCULAR LEFT COMMON FEMORAL COMPRESS: NORMAL
BH CV LOWER VASCULAR LEFT COMMON FEMORAL PHASIC: NORMAL
BH CV LOWER VASCULAR LEFT COMMON FEMORAL SPONT: NORMAL
BH CV LOWER VASCULAR LEFT DISTAL FEMORAL COMPRESS: NORMAL
BH CV LOWER VASCULAR LEFT GASTRONEMIUS COMPRESS: NORMAL
BH CV LOWER VASCULAR LEFT GREATER SAPH AK COMPRESS: NORMAL
BH CV LOWER VASCULAR LEFT GREATER SAPH BK COMPRESS: NORMAL
BH CV LOWER VASCULAR LEFT LESSER SAPH COMPRESS: NORMAL
BH CV LOWER VASCULAR LEFT MID FEMORAL AUGMENT: NORMAL
BH CV LOWER VASCULAR LEFT MID FEMORAL COMPETENT: NORMAL
BH CV LOWER VASCULAR LEFT MID FEMORAL COMPRESS: NORMAL
BH CV LOWER VASCULAR LEFT MID FEMORAL PHASIC: NORMAL
BH CV LOWER VASCULAR LEFT MID FEMORAL SPONT: NORMAL
BH CV LOWER VASCULAR LEFT PERONEAL COMPRESS: NORMAL
BH CV LOWER VASCULAR LEFT POPLITEAL AUGMENT: NORMAL
BH CV LOWER VASCULAR LEFT POPLITEAL COMPETENT: NORMAL
BH CV LOWER VASCULAR LEFT POPLITEAL COMPRESS: NORMAL
BH CV LOWER VASCULAR LEFT POPLITEAL PHASIC: NORMAL
BH CV LOWER VASCULAR LEFT POPLITEAL SPONT: NORMAL
BH CV LOWER VASCULAR LEFT POSTERIOR TIBIAL COMPRESS: NORMAL
BH CV LOWER VASCULAR LEFT PROFUNDA FEMORAL COMPRESS: NORMAL
BH CV LOWER VASCULAR LEFT PROXIMAL FEMORAL COMPRESS: NORMAL
BH CV LOWER VASCULAR LEFT SAPHENOFEMORAL JUNCTION COMPRESS: NORMAL
BH CV LOWER VASCULAR RIGHT COMMON FEMORAL AUGMENT: NORMAL
BH CV LOWER VASCULAR RIGHT COMMON FEMORAL COMPETENT: NORMAL
BH CV LOWER VASCULAR RIGHT COMMON FEMORAL COMPRESS: NORMAL
BH CV LOWER VASCULAR RIGHT COMMON FEMORAL PHASIC: NORMAL
BH CV LOWER VASCULAR RIGHT COMMON FEMORAL SPONT: NORMAL
BH CV LOWER VASCULAR RIGHT DISTAL FEMORAL COMPRESS: NORMAL
BH CV LOWER VASCULAR RIGHT GASTRONEMIUS COMPRESS: NORMAL
BH CV LOWER VASCULAR RIGHT GREATER SAPH AK COMPRESS: NORMAL
BH CV LOWER VASCULAR RIGHT GREATER SAPH BK COMPRESS: NORMAL
BH CV LOWER VASCULAR RIGHT LESSER SAPH COMPRESS: NORMAL
BH CV LOWER VASCULAR RIGHT MID FEMORAL AUGMENT: NORMAL
BH CV LOWER VASCULAR RIGHT MID FEMORAL COMPETENT: NORMAL
BH CV LOWER VASCULAR RIGHT MID FEMORAL COMPRESS: NORMAL
BH CV LOWER VASCULAR RIGHT MID FEMORAL PHASIC: NORMAL
BH CV LOWER VASCULAR RIGHT MID FEMORAL SPONT: NORMAL
BH CV LOWER VASCULAR RIGHT PERONEAL COMPRESS: NORMAL
BH CV LOWER VASCULAR RIGHT POPLITEAL AUGMENT: NORMAL
BH CV LOWER VASCULAR RIGHT POPLITEAL COMPETENT: NORMAL
BH CV LOWER VASCULAR RIGHT POPLITEAL COMPRESS: NORMAL
BH CV LOWER VASCULAR RIGHT POPLITEAL PHASIC: NORMAL
BH CV LOWER VASCULAR RIGHT POPLITEAL SPONT: NORMAL
BH CV LOWER VASCULAR RIGHT POSTERIOR TIBIAL COMPRESS: NORMAL
BH CV LOWER VASCULAR RIGHT PROFUNDA FEMORAL COMPRESS: NORMAL
BH CV LOWER VASCULAR RIGHT PROXIMAL FEMORAL COMPRESS: NORMAL
BH CV LOWER VASCULAR RIGHT SAPHENOFEMORAL JUNCTION COMPRESS: NORMAL
BILIRUB CONJ SERPL-MCNC: <0.2 MG/DL (ref 0–0.3)
BILIRUB INDIRECT SERPL-MCNC: ABNORMAL MG/DL
BILIRUB SERPL-MCNC: 0.3 MG/DL (ref 0–1.2)
BUN SERPL-MCNC: 18 MG/DL (ref 6–20)
BUN SERPL-MCNC: 19 MG/DL (ref 6–20)
BUN/CREAT SERPL: 6.7 (ref 7–25)
BUN/CREAT SERPL: 6.9 (ref 7–25)
CALCIUM SPEC-SCNC: 7.9 MG/DL (ref 8.6–10.5)
CALCIUM SPEC-SCNC: 8 MG/DL (ref 8.6–10.5)
CHLORIDE SERPL-SCNC: 95 MMOL/L (ref 98–107)
CHLORIDE SERPL-SCNC: 96 MMOL/L (ref 98–107)
CK SERPL-CCNC: 104 U/L (ref 20–180)
CO2 SERPL-SCNC: 27 MMOL/L (ref 22–29)
CO2 SERPL-SCNC: 27.2 MMOL/L (ref 22–29)
CREAT SERPL-MCNC: 2.62 MG/DL (ref 0.57–1)
CREAT SERPL-MCNC: 2.82 MG/DL (ref 0.57–1)
CRP SERPL-MCNC: 17.17 MG/DL (ref 0–0.5)
DEPRECATED RDW RBC AUTO: 50.5 FL (ref 37–54)
EGFRCR SERPLBLD CKD-EPI 2021: 19 ML/MIN/1.73
EGFRCR SERPLBLD CKD-EPI 2021: 20.7 ML/MIN/1.73
ERYTHROCYTE [DISTWIDTH] IN BLOOD BY AUTOMATED COUNT: 15.5 % (ref 12.3–15.4)
ERYTHROCYTE [SEDIMENTATION RATE] IN BLOOD: 37 MM/HR (ref 0–30)
GLUCOSE BLDC GLUCOMTR-MCNC: 120 MG/DL (ref 70–130)
GLUCOSE BLDC GLUCOMTR-MCNC: 197 MG/DL (ref 70–130)
GLUCOSE BLDC GLUCOMTR-MCNC: 235 MG/DL (ref 70–130)
GLUCOSE BLDC GLUCOMTR-MCNC: 288 MG/DL (ref 70–130)
GLUCOSE SERPL-MCNC: 236 MG/DL (ref 65–99)
GLUCOSE SERPL-MCNC: 298 MG/DL (ref 65–99)
HCT VFR BLD AUTO: 29 % (ref 34–46.6)
HGB BLD-MCNC: 8.9 G/DL (ref 12–15.9)
LDH SERPL-CCNC: 267 U/L (ref 135–214)
MAXIMAL PREDICTED HEART RATE: 163 BPM
MCH RBC QN AUTO: 27.6 PG (ref 26.6–33)
MCHC RBC AUTO-ENTMCNC: 30.7 G/DL (ref 31.5–35.7)
MCV RBC AUTO: 89.8 FL (ref 79–97)
PHOSPHATE SERPL-MCNC: 3 MG/DL (ref 2.5–4.5)
PLATELET # BLD AUTO: 273 10*3/MM3 (ref 140–450)
PMV BLD AUTO: 10.3 FL (ref 6–12)
POTASSIUM SERPL-SCNC: 3.7 MMOL/L (ref 3.5–5.2)
POTASSIUM SERPL-SCNC: 3.9 MMOL/L (ref 3.5–5.2)
PROT SERPL-MCNC: 6.1 G/DL (ref 6–8.5)
RBC # BLD AUTO: 3.23 10*6/MM3 (ref 3.77–5.28)
SODIUM SERPL-SCNC: 132 MMOL/L (ref 136–145)
SODIUM SERPL-SCNC: 134 MMOL/L (ref 136–145)
STRESS TARGET HR: 139 BPM
WBC NRBC COR # BLD: 17.34 10*3/MM3 (ref 3.4–10.8)

## 2022-12-28 PROCEDURE — 82248 BILIRUBIN DIRECT: CPT | Performed by: INTERNAL MEDICINE

## 2022-12-28 PROCEDURE — 80053 COMPREHEN METABOLIC PANEL: CPT | Performed by: HOSPITALIST

## 2022-12-28 PROCEDURE — 83615 LACTATE (LD) (LDH) ENZYME: CPT | Performed by: HOSPITALIST

## 2022-12-28 PROCEDURE — 93970 EXTREMITY STUDY: CPT

## 2022-12-28 PROCEDURE — 84100 ASSAY OF PHOSPHORUS: CPT | Performed by: INTERNAL MEDICINE

## 2022-12-28 PROCEDURE — 97166 OT EVAL MOD COMPLEX 45 MIN: CPT

## 2022-12-28 PROCEDURE — 97530 THERAPEUTIC ACTIVITIES: CPT

## 2022-12-28 PROCEDURE — 82962 GLUCOSE BLOOD TEST: CPT

## 2022-12-28 PROCEDURE — 86140 C-REACTIVE PROTEIN: CPT | Performed by: HOSPITALIST

## 2022-12-28 PROCEDURE — 82550 ASSAY OF CK (CPK): CPT | Performed by: HOSPITALIST

## 2022-12-28 PROCEDURE — 97116 GAIT TRAINING THERAPY: CPT

## 2022-12-28 PROCEDURE — 85652 RBC SED RATE AUTOMATED: CPT | Performed by: HOSPITALIST

## 2022-12-28 PROCEDURE — 63710000001 INSULIN LISPRO (HUMAN) PER 5 UNITS: Performed by: NURSE PRACTITIONER

## 2022-12-28 PROCEDURE — 73700 CT LOWER EXTREMITY W/O DYE: CPT

## 2022-12-28 PROCEDURE — 85027 COMPLETE CBC AUTOMATED: CPT | Performed by: HOSPITALIST

## 2022-12-28 RX ORDER — LIDOCAINE 50 MG/G
2 PATCH TOPICAL
Status: DISCONTINUED | OUTPATIENT
Start: 2022-12-28 | End: 2023-01-29 | Stop reason: HOSPADM

## 2022-12-28 RX ORDER — HYDROCODONE BITARTRATE AND ACETAMINOPHEN 5; 325 MG/1; MG/1
1 TABLET ORAL EVERY 6 HOURS PRN
Status: DISPENSED | OUTPATIENT
Start: 2022-12-28 | End: 2023-01-04

## 2022-12-28 RX ADMIN — PANTOPRAZOLE SODIUM 40 MG: 40 TABLET, DELAYED RELEASE ORAL at 09:15

## 2022-12-28 RX ADMIN — FOLIC ACID 1 MG: 1 TABLET ORAL at 09:15

## 2022-12-28 RX ADMIN — LACTULOSE 10 G: 10 SOLUTION ORAL at 21:02

## 2022-12-28 RX ADMIN — APIXABAN 2.5 MG: 2.5 TABLET, FILM COATED ORAL at 09:15

## 2022-12-28 RX ADMIN — INSULIN LISPRO 4 UNITS: 100 INJECTION, SOLUTION INTRAVENOUS; SUBCUTANEOUS at 12:24

## 2022-12-28 RX ADMIN — LEVOTHYROXINE SODIUM 200 MCG: 0.1 TABLET ORAL at 05:33

## 2022-12-28 RX ADMIN — APIXABAN 2.5 MG: 2.5 TABLET, FILM COATED ORAL at 21:01

## 2022-12-28 RX ADMIN — AMLODIPINE BESYLATE 10 MG: 10 TABLET ORAL at 09:15

## 2022-12-28 RX ADMIN — CARVEDILOL 25 MG: 25 TABLET, FILM COATED ORAL at 18:47

## 2022-12-28 RX ADMIN — CARVEDILOL 25 MG: 25 TABLET, FILM COATED ORAL at 09:15

## 2022-12-28 RX ADMIN — LACTULOSE 10 G: 10 SOLUTION ORAL at 09:15

## 2022-12-28 RX ADMIN — INSULIN LISPRO 3 UNITS: 100 INJECTION, SOLUTION INTRAVENOUS; SUBCUTANEOUS at 09:15

## 2022-12-28 RX ADMIN — HYDROCODONE BITARTRATE AND ACETAMINOPHEN 1 TABLET: 5; 325 TABLET ORAL at 12:23

## 2022-12-28 RX ADMIN — HYDROCODONE BITARTRATE AND ACETAMINOPHEN 1 TABLET: 5; 325 TABLET ORAL at 21:10

## 2022-12-28 RX ADMIN — ACETAMINOPHEN 650 MG: 325 TABLET, FILM COATED ORAL at 05:30

## 2022-12-28 RX ADMIN — Medication 10 ML: at 09:15

## 2022-12-28 RX ADMIN — LEVOTHYROXINE SODIUM 75 MCG: 0.07 TABLET ORAL at 05:32

## 2022-12-28 RX ADMIN — Medication 10 ML: at 21:02

## 2022-12-28 RX ADMIN — ACETAMINOPHEN 650 MG: 325 TABLET, FILM COATED ORAL at 15:40

## 2022-12-28 RX ADMIN — LIDOCAINE 2 PATCH: 700 PATCH TOPICAL at 12:23

## 2022-12-29 ENCOUNTER — APPOINTMENT (OUTPATIENT)
Dept: MRI IMAGING | Facility: HOSPITAL | Age: 57
DRG: 270 | End: 2022-12-29
Payer: MEDICARE

## 2022-12-29 LAB
ALBUMIN SERPL-MCNC: 2.8 G/DL (ref 3.5–5.2)
ANION GAP SERPL CALCULATED.3IONS-SCNC: 10 MMOL/L (ref 5–15)
BUN SERPL-MCNC: 28 MG/DL (ref 6–20)
BUN/CREAT SERPL: 8.6 (ref 7–25)
CALCIUM SPEC-SCNC: 8 MG/DL (ref 8.6–10.5)
CHLORIDE SERPL-SCNC: 95 MMOL/L (ref 98–107)
CO2 SERPL-SCNC: 25 MMOL/L (ref 22–29)
CREAT SERPL-MCNC: 3.27 MG/DL (ref 0.57–1)
DEPRECATED RDW RBC AUTO: 51 FL (ref 37–54)
EGFRCR SERPLBLD CKD-EPI 2021: 15.9 ML/MIN/1.73
ERYTHROCYTE [DISTWIDTH] IN BLOOD BY AUTOMATED COUNT: 15.8 % (ref 12.3–15.4)
GLUCOSE BLDC GLUCOMTR-MCNC: 132 MG/DL (ref 70–130)
GLUCOSE BLDC GLUCOMTR-MCNC: 187 MG/DL (ref 70–130)
GLUCOSE BLDC GLUCOMTR-MCNC: 236 MG/DL (ref 70–130)
GLUCOSE BLDC GLUCOMTR-MCNC: 249 MG/DL (ref 70–130)
GLUCOSE SERPL-MCNC: 258 MG/DL (ref 65–99)
HCT VFR BLD AUTO: 28.5 % (ref 34–46.6)
HGB BLD-MCNC: 9.3 G/DL (ref 12–15.9)
MCH RBC QN AUTO: 29.1 PG (ref 26.6–33)
MCHC RBC AUTO-ENTMCNC: 32.6 G/DL (ref 31.5–35.7)
MCV RBC AUTO: 89.1 FL (ref 79–97)
PHOSPHATE SERPL-MCNC: 3.7 MG/DL (ref 2.5–4.5)
PLATELET # BLD AUTO: 241 10*3/MM3 (ref 140–450)
PMV BLD AUTO: 10.3 FL (ref 6–12)
POTASSIUM SERPL-SCNC: 4.6 MMOL/L (ref 3.5–5.2)
RBC # BLD AUTO: 3.2 10*6/MM3 (ref 3.77–5.28)
SODIUM SERPL-SCNC: 130 MMOL/L (ref 136–145)
WBC NRBC COR # BLD: 15.11 10*3/MM3 (ref 3.4–10.8)

## 2022-12-29 PROCEDURE — 63710000001 INSULIN LISPRO (HUMAN) PER 5 UNITS: Performed by: NURSE PRACTITIONER

## 2022-12-29 PROCEDURE — 82962 GLUCOSE BLOOD TEST: CPT

## 2022-12-29 PROCEDURE — 85027 COMPLETE CBC AUTOMATED: CPT | Performed by: HOSPITALIST

## 2022-12-29 PROCEDURE — 25010000002 HYDROMORPHONE PER 4 MG: Performed by: HOSPITALIST

## 2022-12-29 PROCEDURE — 80069 RENAL FUNCTION PANEL: CPT | Performed by: INTERNAL MEDICINE

## 2022-12-29 PROCEDURE — 25010000002 HEPARIN (PORCINE) PER 1000 UNITS: Performed by: INTERNAL MEDICINE

## 2022-12-29 PROCEDURE — 73721 MRI JNT OF LWR EXTRE W/O DYE: CPT

## 2022-12-29 RX ORDER — HYDROMORPHONE HYDROCHLORIDE 1 MG/ML
0.25 INJECTION, SOLUTION INTRAMUSCULAR; INTRAVENOUS; SUBCUTANEOUS
Status: DISCONTINUED | OUTPATIENT
Start: 2022-12-29 | End: 2022-12-30

## 2022-12-29 RX ORDER — LISINOPRIL 10 MG/1
10 TABLET ORAL NIGHTLY
Status: DISCONTINUED | OUTPATIENT
Start: 2022-12-29 | End: 2022-12-31

## 2022-12-29 RX ADMIN — HYDROMORPHONE HYDROCHLORIDE 0.25 MG: 1 INJECTION, SOLUTION INTRAMUSCULAR; INTRAVENOUS; SUBCUTANEOUS at 10:52

## 2022-12-29 RX ADMIN — LACTULOSE 10 G: 10 SOLUTION ORAL at 22:44

## 2022-12-29 RX ADMIN — LISINOPRIL 10 MG: 10 TABLET ORAL at 23:03

## 2022-12-29 RX ADMIN — INSULIN LISPRO 3 UNITS: 100 INJECTION, SOLUTION INTRAVENOUS; SUBCUTANEOUS at 09:10

## 2022-12-29 RX ADMIN — LIDOCAINE 2 PATCH: 700 PATCH TOPICAL at 09:09

## 2022-12-29 RX ADMIN — LEVOTHYROXINE SODIUM 275 MCG: 0.17 TABLET ORAL at 05:46

## 2022-12-29 RX ADMIN — APIXABAN 2.5 MG: 2.5 TABLET, FILM COATED ORAL at 09:09

## 2022-12-29 RX ADMIN — HYDROMORPHONE HYDROCHLORIDE 0.25 MG: 1 INJECTION, SOLUTION INTRAMUSCULAR; INTRAVENOUS; SUBCUTANEOUS at 15:52

## 2022-12-29 RX ADMIN — HYDROCODONE BITARTRATE AND ACETAMINOPHEN 1 TABLET: 5; 325 TABLET ORAL at 18:09

## 2022-12-29 RX ADMIN — LACTULOSE 10 G: 10 SOLUTION ORAL at 09:10

## 2022-12-29 RX ADMIN — Medication 10 ML: at 22:44

## 2022-12-29 RX ADMIN — HEPARIN SODIUM 4000 UNITS: 1000 INJECTION INTRAVENOUS; SUBCUTANEOUS at 11:30

## 2022-12-29 RX ADMIN — AMLODIPINE BESYLATE 10 MG: 10 TABLET ORAL at 09:09

## 2022-12-29 RX ADMIN — HYDROMORPHONE HYDROCHLORIDE 0.25 MG: 1 INJECTION, SOLUTION INTRAMUSCULAR; INTRAVENOUS; SUBCUTANEOUS at 20:26

## 2022-12-29 RX ADMIN — HYDROCODONE BITARTRATE AND ACETAMINOPHEN 1 TABLET: 5; 325 TABLET ORAL at 09:09

## 2022-12-29 RX ADMIN — INSULIN LISPRO 3 UNITS: 100 INJECTION, SOLUTION INTRAVENOUS; SUBCUTANEOUS at 18:04

## 2022-12-29 RX ADMIN — CARVEDILOL 25 MG: 25 TABLET, FILM COATED ORAL at 18:04

## 2022-12-29 RX ADMIN — CARVEDILOL 25 MG: 25 TABLET, FILM COATED ORAL at 09:09

## 2022-12-29 RX ADMIN — Medication 10 ML: at 09:10

## 2022-12-29 RX ADMIN — HYDROMORPHONE HYDROCHLORIDE 0.25 MG: 1 INJECTION, SOLUTION INTRAMUSCULAR; INTRAVENOUS; SUBCUTANEOUS at 13:12

## 2022-12-29 RX ADMIN — APIXABAN 2.5 MG: 2.5 TABLET, FILM COATED ORAL at 23:02

## 2022-12-29 RX ADMIN — HYDROCODONE BITARTRATE AND ACETAMINOPHEN 1 TABLET: 5; 325 TABLET ORAL at 03:03

## 2022-12-29 RX ADMIN — PANTOPRAZOLE SODIUM 40 MG: 40 TABLET, DELAYED RELEASE ORAL at 09:09

## 2022-12-29 RX ADMIN — FOLIC ACID 1 MG: 1 TABLET ORAL at 09:09

## 2022-12-30 ENCOUNTER — APPOINTMENT (OUTPATIENT)
Dept: GENERAL RADIOLOGY | Facility: HOSPITAL | Age: 57
DRG: 270 | End: 2022-12-30
Payer: MEDICARE

## 2022-12-30 ENCOUNTER — ANESTHESIA (OUTPATIENT)
Dept: PERIOP | Facility: HOSPITAL | Age: 57
DRG: 270 | End: 2022-12-30
Payer: MEDICARE

## 2022-12-30 ENCOUNTER — ANESTHESIA EVENT (OUTPATIENT)
Dept: PERIOP | Facility: HOSPITAL | Age: 57
DRG: 270 | End: 2022-12-30
Payer: MEDICARE

## 2022-12-30 PROBLEM — E11.69: Status: ACTIVE | Noted: 2022-01-01

## 2022-12-30 PROBLEM — M62.20: Status: ACTIVE | Noted: 2022-01-01

## 2022-12-30 LAB
ALBUMIN SERPL-MCNC: 2.5 G/DL (ref 3.5–5.2)
ALBUMIN/GLOB SERPL: 0.9 G/DL
ALP SERPL-CCNC: 134 U/L (ref 39–117)
ALT SERPL W P-5'-P-CCNC: 6 U/L (ref 1–33)
ANION GAP SERPL CALCULATED.3IONS-SCNC: 10 MMOL/L (ref 5–15)
ANION GAP SERPL CALCULATED.3IONS-SCNC: 12 MMOL/L (ref 5–15)
ANION GAP SERPL CALCULATED.3IONS-SCNC: 12 MMOL/L (ref 5–15)
AST SERPL-CCNC: 15 U/L (ref 1–32)
BASOPHILS # BLD AUTO: 0.03 10*3/MM3 (ref 0–0.2)
BASOPHILS NFR BLD AUTO: 0.2 % (ref 0–1.5)
BILIRUB SERPL-MCNC: 0.2 MG/DL (ref 0–1.2)
BUN SERPL-MCNC: 24 MG/DL (ref 6–20)
BUN SERPL-MCNC: 27 MG/DL (ref 6–20)
BUN SERPL-MCNC: 32 MG/DL (ref 6–20)
BUN/CREAT SERPL: 8 (ref 7–25)
BUN/CREAT SERPL: 8 (ref 7–25)
BUN/CREAT SERPL: 8.9 (ref 7–25)
CALCIUM SPEC-SCNC: 7.8 MG/DL (ref 8.6–10.5)
CALCIUM SPEC-SCNC: 7.8 MG/DL (ref 8.6–10.5)
CALCIUM SPEC-SCNC: 8 MG/DL (ref 8.6–10.5)
CHLORIDE SERPL-SCNC: 97 MMOL/L (ref 98–107)
CHLORIDE SERPL-SCNC: 97 MMOL/L (ref 98–107)
CHLORIDE SERPL-SCNC: 98 MMOL/L (ref 98–107)
CO2 SERPL-SCNC: 22 MMOL/L (ref 22–29)
CO2 SERPL-SCNC: 23 MMOL/L (ref 22–29)
CO2 SERPL-SCNC: 25 MMOL/L (ref 22–29)
CREAT SERPL-MCNC: 3 MG/DL (ref 0.57–1)
CREAT SERPL-MCNC: 3.36 MG/DL (ref 0.57–1)
CREAT SERPL-MCNC: 3.59 MG/DL (ref 0.57–1)
D-LACTATE SERPL-SCNC: 2.9 MMOL/L (ref 0.5–2)
DEPRECATED RDW RBC AUTO: 51.4 FL (ref 37–54)
DEPRECATED RDW RBC AUTO: 52.2 FL (ref 37–54)
EGFRCR SERPLBLD CKD-EPI 2021: 14.2 ML/MIN/1.73
EGFRCR SERPLBLD CKD-EPI 2021: 15.4 ML/MIN/1.73
EGFRCR SERPLBLD CKD-EPI 2021: 17.6 ML/MIN/1.73
EOSINOPHIL # BLD AUTO: 0.01 10*3/MM3 (ref 0–0.4)
EOSINOPHIL NFR BLD AUTO: 0.1 % (ref 0.3–6.2)
ERYTHROCYTE [DISTWIDTH] IN BLOOD BY AUTOMATED COUNT: 15.6 % (ref 12.3–15.4)
ERYTHROCYTE [DISTWIDTH] IN BLOOD BY AUTOMATED COUNT: 15.8 % (ref 12.3–15.4)
GLOBULIN UR ELPH-MCNC: 2.9 GM/DL
GLUCOSE BLDC GLUCOMTR-MCNC: 118 MG/DL (ref 70–130)
GLUCOSE BLDC GLUCOMTR-MCNC: 145 MG/DL (ref 70–130)
GLUCOSE BLDC GLUCOMTR-MCNC: 168 MG/DL (ref 70–130)
GLUCOSE BLDC GLUCOMTR-MCNC: 257 MG/DL (ref 70–130)
GLUCOSE BLDC GLUCOMTR-MCNC: 287 MG/DL (ref 70–130)
GLUCOSE BLDC GLUCOMTR-MCNC: 303 MG/DL (ref 70–130)
GLUCOSE SERPL-MCNC: 114 MG/DL (ref 65–99)
GLUCOSE SERPL-MCNC: 270 MG/DL (ref 65–99)
GLUCOSE SERPL-MCNC: 313 MG/DL (ref 65–99)
HCT VFR BLD AUTO: 22.9 % (ref 34–46.6)
HCT VFR BLD AUTO: 28.7 % (ref 34–46.6)
HCT VFR BLD AUTO: 29.9 % (ref 34–46.6)
HGB BLD-MCNC: 7.2 G/DL (ref 12–15.9)
HGB BLD-MCNC: 8.8 G/DL (ref 12–15.9)
HGB BLD-MCNC: 9.4 G/DL (ref 12–15.9)
IMM GRANULOCYTES # BLD AUTO: 0.1 10*3/MM3 (ref 0–0.05)
IMM GRANULOCYTES NFR BLD AUTO: 0.7 % (ref 0–0.5)
LYMPHOCYTES # BLD AUTO: 0.44 10*3/MM3 (ref 0.7–3.1)
LYMPHOCYTES NFR BLD AUTO: 3.3 % (ref 19.6–45.3)
MCH RBC QN AUTO: 27.8 PG (ref 26.6–33)
MCH RBC QN AUTO: 28.3 PG (ref 26.6–33)
MCHC RBC AUTO-ENTMCNC: 30.7 G/DL (ref 31.5–35.7)
MCHC RBC AUTO-ENTMCNC: 31.4 G/DL (ref 31.5–35.7)
MCV RBC AUTO: 88.4 FL (ref 79–97)
MCV RBC AUTO: 92.3 FL (ref 79–97)
MONOCYTES # BLD AUTO: 0.37 10*3/MM3 (ref 0.1–0.9)
MONOCYTES NFR BLD AUTO: 2.8 % (ref 5–12)
NEUTROPHILS NFR BLD AUTO: 12.43 10*3/MM3 (ref 1.7–7)
NEUTROPHILS NFR BLD AUTO: 92.9 % (ref 42.7–76)
NRBC BLD AUTO-RTO: 0 /100 WBC (ref 0–0.2)
PLATELET # BLD AUTO: 218 10*3/MM3 (ref 140–450)
PLATELET # BLD AUTO: 231 10*3/MM3 (ref 140–450)
PMV BLD AUTO: 10 FL (ref 6–12)
PMV BLD AUTO: 9.9 FL (ref 6–12)
POTASSIUM SERPL-SCNC: 4.6 MMOL/L (ref 3.5–5.2)
POTASSIUM SERPL-SCNC: 4.9 MMOL/L (ref 3.5–5.2)
POTASSIUM SERPL-SCNC: 5.1 MMOL/L (ref 3.5–5.2)
PROCALCITONIN SERPL-MCNC: 0.35 NG/ML (ref 0–0.25)
PROT SERPL-MCNC: 5.4 G/DL (ref 6–8.5)
RBC # BLD AUTO: 2.59 10*6/MM3 (ref 3.77–5.28)
RBC # BLD AUTO: 3.11 10*6/MM3 (ref 3.77–5.28)
SODIUM SERPL-SCNC: 131 MMOL/L (ref 136–145)
SODIUM SERPL-SCNC: 132 MMOL/L (ref 136–145)
SODIUM SERPL-SCNC: 133 MMOL/L (ref 136–145)
WBC NRBC COR # BLD: 12 10*3/MM3 (ref 3.4–10.8)
WBC NRBC COR # BLD: 13.38 10*3/MM3 (ref 3.4–10.8)

## 2022-12-30 PROCEDURE — 25010000002 PROPOFOL 10 MG/ML EMULSION: Performed by: NURSE ANESTHETIST, CERTIFIED REGISTERED

## 2022-12-30 PROCEDURE — 74018 RADEX ABDOMEN 1 VIEW: CPT

## 2022-12-30 PROCEDURE — 25010000002 DEXAMETHASONE SODIUM PHOSPHATE 20 MG/5ML SOLUTION: Performed by: NURSE ANESTHETIST, CERTIFIED REGISTERED

## 2022-12-30 PROCEDURE — 85018 HEMOGLOBIN: CPT | Performed by: HOSPITALIST

## 2022-12-30 PROCEDURE — 85025 COMPLETE CBC W/AUTO DIFF WBC: CPT | Performed by: HOSPITALIST

## 2022-12-30 PROCEDURE — 63710000001 PREDNISONE PER 1 MG: Performed by: HOSPITALIST

## 2022-12-30 PROCEDURE — 25010000002 FENTANYL CITRATE (PF) 50 MCG/ML SOLUTION: Performed by: UROLOGY

## 2022-12-30 PROCEDURE — 25010000002 CEFAZOLIN IN DEXTROSE 2-4 GM/100ML-% SOLUTION: Performed by: UROLOGY

## 2022-12-30 PROCEDURE — 85014 HEMATOCRIT: CPT | Performed by: HOSPITALIST

## 2022-12-30 PROCEDURE — 85027 COMPLETE CBC AUTOMATED: CPT | Performed by: NURSE PRACTITIONER

## 2022-12-30 PROCEDURE — 25010000002 EPOETIN ALFA-EPBX 10000 UNIT/ML SOLUTION: Performed by: UROLOGY

## 2022-12-30 PROCEDURE — 85027 COMPLETE CBC AUTOMATED: CPT | Performed by: HOSPITALIST

## 2022-12-30 PROCEDURE — 76000 FLUOROSCOPY <1 HR PHYS/QHP: CPT

## 2022-12-30 PROCEDURE — 80053 COMPREHEN METABOLIC PANEL: CPT | Performed by: HOSPITALIST

## 2022-12-30 PROCEDURE — 25010000002 ONDANSETRON PER 1 MG: Performed by: UROLOGY

## 2022-12-30 PROCEDURE — 25010000002 ONDANSETRON PER 1 MG: Performed by: NURSE ANESTHETIST, CERTIFIED REGISTERED

## 2022-12-30 PROCEDURE — C2617 STENT, NON-COR, TEM W/O DEL: HCPCS | Performed by: UROLOGY

## 2022-12-30 PROCEDURE — 0T778DZ DILATION OF LEFT URETER WITH INTRALUMINAL DEVICE, VIA NATURAL OR ARTIFICIAL OPENING ENDOSCOPIC: ICD-10-PCS | Performed by: UROLOGY

## 2022-12-30 PROCEDURE — 82962 GLUCOSE BLOOD TEST: CPT

## 2022-12-30 PROCEDURE — 84145 PROCALCITONIN (PCT): CPT | Performed by: NURSE PRACTITIONER

## 2022-12-30 PROCEDURE — 25010000002 HYDROMORPHONE PER 4 MG: Performed by: HOSPITALIST

## 2022-12-30 PROCEDURE — 83605 ASSAY OF LACTIC ACID: CPT | Performed by: NURSE PRACTITIONER

## 2022-12-30 PROCEDURE — 63710000001 INSULIN LISPRO (HUMAN) PER 5 UNITS: Performed by: UROLOGY

## 2022-12-30 DEVICE — URETERAL STENT
Type: IMPLANTABLE DEVICE | Site: URETER | Status: FUNCTIONAL
Brand: POLARIS™ ULTRA

## 2022-12-30 RX ORDER — FLUMAZENIL 0.1 MG/ML
0.2 INJECTION INTRAVENOUS AS NEEDED
Status: DISCONTINUED | OUTPATIENT
Start: 2022-12-30 | End: 2023-01-14

## 2022-12-30 RX ORDER — LIDOCAINE HYDROCHLORIDE 10 MG/ML
0.5 INJECTION, SOLUTION EPIDURAL; INFILTRATION; INTRACAUDAL; PERINEURAL ONCE AS NEEDED
Status: DISCONTINUED | OUTPATIENT
Start: 2022-12-30 | End: 2022-12-30 | Stop reason: HOSPADM

## 2022-12-30 RX ORDER — NALOXONE HCL 0.4 MG/ML
0.2 VIAL (ML) INJECTION AS NEEDED
Status: DISCONTINUED | OUTPATIENT
Start: 2022-12-30 | End: 2023-01-01

## 2022-12-30 RX ORDER — CEFAZOLIN SODIUM 2 G/100ML
2 INJECTION, SOLUTION INTRAVENOUS ONCE
Status: COMPLETED | OUTPATIENT
Start: 2022-12-30 | End: 2022-12-30

## 2022-12-30 RX ORDER — SODIUM CHLORIDE 0.9 % (FLUSH) 0.9 %
3-10 SYRINGE (ML) INJECTION AS NEEDED
Status: DISCONTINUED | OUTPATIENT
Start: 2022-12-30 | End: 2022-12-30 | Stop reason: HOSPADM

## 2022-12-30 RX ORDER — DEXAMETHASONE SODIUM PHOSPHATE 4 MG/ML
INJECTION, SOLUTION INTRA-ARTICULAR; INTRALESIONAL; INTRAMUSCULAR; INTRAVENOUS; SOFT TISSUE AS NEEDED
Status: DISCONTINUED | OUTPATIENT
Start: 2022-12-30 | End: 2022-12-30 | Stop reason: SURG

## 2022-12-30 RX ORDER — SODIUM CHLORIDE 9 MG/ML
9 INJECTION, SOLUTION INTRAVENOUS CONTINUOUS
Status: DISCONTINUED | OUTPATIENT
Start: 2022-12-30 | End: 2023-01-10

## 2022-12-30 RX ORDER — MIDAZOLAM HYDROCHLORIDE 1 MG/ML
1 INJECTION INTRAMUSCULAR; INTRAVENOUS
Status: DISCONTINUED | OUTPATIENT
Start: 2022-12-30 | End: 2022-12-30 | Stop reason: HOSPADM

## 2022-12-30 RX ORDER — EPHEDRINE SULFATE 50 MG/ML
5 INJECTION, SOLUTION INTRAVENOUS ONCE AS NEEDED
Status: DISCONTINUED | OUTPATIENT
Start: 2022-12-30 | End: 2022-12-31

## 2022-12-30 RX ORDER — HYDRALAZINE HYDROCHLORIDE 20 MG/ML
5 INJECTION INTRAMUSCULAR; INTRAVENOUS
Status: DISCONTINUED | OUTPATIENT
Start: 2022-12-30 | End: 2023-01-01

## 2022-12-30 RX ORDER — ROCURONIUM BROMIDE 10 MG/ML
INJECTION, SOLUTION INTRAVENOUS AS NEEDED
Status: DISCONTINUED | OUTPATIENT
Start: 2022-12-30 | End: 2022-12-30 | Stop reason: SURG

## 2022-12-30 RX ORDER — PREDNISONE 20 MG/1
40 TABLET ORAL
Status: DISCONTINUED | OUTPATIENT
Start: 2022-12-30 | End: 2022-12-31

## 2022-12-30 RX ORDER — ASPIRIN 81 MG/1
81 TABLET ORAL DAILY
Status: DISCONTINUED | OUTPATIENT
Start: 2022-12-30 | End: 2022-12-31

## 2022-12-30 RX ORDER — FAMOTIDINE 10 MG/ML
20 INJECTION, SOLUTION INTRAVENOUS ONCE
Status: COMPLETED | OUTPATIENT
Start: 2022-12-30 | End: 2022-12-30

## 2022-12-30 RX ORDER — SODIUM CHLORIDE, SODIUM LACTATE, POTASSIUM CHLORIDE, CALCIUM CHLORIDE 600; 310; 30; 20 MG/100ML; MG/100ML; MG/100ML; MG/100ML
9 INJECTION, SOLUTION INTRAVENOUS CONTINUOUS
Status: DISCONTINUED | OUTPATIENT
Start: 2022-12-30 | End: 2022-12-30

## 2022-12-30 RX ORDER — FENTANYL CITRATE 50 UG/ML
25 INJECTION, SOLUTION INTRAMUSCULAR; INTRAVENOUS
Status: DISCONTINUED | OUTPATIENT
Start: 2022-12-30 | End: 2022-12-31

## 2022-12-30 RX ORDER — LABETALOL HYDROCHLORIDE 5 MG/ML
5 INJECTION, SOLUTION INTRAVENOUS
Status: DISCONTINUED | OUTPATIENT
Start: 2022-12-30 | End: 2023-01-01

## 2022-12-30 RX ORDER — ONDANSETRON 2 MG/ML
4 INJECTION INTRAMUSCULAR; INTRAVENOUS ONCE AS NEEDED
Status: COMPLETED | OUTPATIENT
Start: 2022-12-30 | End: 2022-12-31

## 2022-12-30 RX ORDER — ONDANSETRON 2 MG/ML
INJECTION INTRAMUSCULAR; INTRAVENOUS AS NEEDED
Status: DISCONTINUED | OUTPATIENT
Start: 2022-12-30 | End: 2022-12-30 | Stop reason: SURG

## 2022-12-30 RX ORDER — DIPHENHYDRAMINE HYDROCHLORIDE 50 MG/ML
12.5 INJECTION INTRAMUSCULAR; INTRAVENOUS
Status: DISCONTINUED | OUTPATIENT
Start: 2022-12-30 | End: 2022-12-31

## 2022-12-30 RX ORDER — LIDOCAINE HYDROCHLORIDE 20 MG/ML
INJECTION, SOLUTION INFILTRATION; PERINEURAL AS NEEDED
Status: DISCONTINUED | OUTPATIENT
Start: 2022-12-30 | End: 2022-12-30 | Stop reason: SURG

## 2022-12-30 RX ORDER — SODIUM CHLORIDE 0.9 % (FLUSH) 0.9 %
3 SYRINGE (ML) INJECTION EVERY 12 HOURS SCHEDULED
Status: DISCONTINUED | OUTPATIENT
Start: 2022-12-30 | End: 2022-12-30 | Stop reason: HOSPADM

## 2022-12-30 RX ORDER — PHENYLEPHRINE HCL IN 0.9% NACL 1 MG/10 ML
SYRINGE (ML) INTRAVENOUS AS NEEDED
Status: DISCONTINUED | OUTPATIENT
Start: 2022-12-30 | End: 2022-12-30 | Stop reason: SURG

## 2022-12-30 RX ORDER — MAGNESIUM HYDROXIDE 1200 MG/15ML
LIQUID ORAL AS NEEDED
Status: DISCONTINUED | OUTPATIENT
Start: 2022-12-30 | End: 2022-12-30 | Stop reason: HOSPADM

## 2022-12-30 RX ORDER — PROPOFOL 10 MG/ML
VIAL (ML) INTRAVENOUS AS NEEDED
Status: DISCONTINUED | OUTPATIENT
Start: 2022-12-30 | End: 2022-12-30 | Stop reason: SURG

## 2022-12-30 RX ORDER — DIPHENHYDRAMINE HCL 25 MG
25 CAPSULE ORAL
Status: DISCONTINUED | OUTPATIENT
Start: 2022-12-30 | End: 2022-12-31

## 2022-12-30 RX ADMIN — DEXAMETHASONE SODIUM PHOSPHATE 6 MG: 4 INJECTION, SOLUTION INTRAMUSCULAR; INTRAVENOUS at 07:58

## 2022-12-30 RX ADMIN — Medication 10 ML: at 01:47

## 2022-12-30 RX ADMIN — ONDANSETRON 4 MG: 2 INJECTION INTRAMUSCULAR; INTRAVENOUS at 11:04

## 2022-12-30 RX ADMIN — INSULIN LISPRO 4 UNITS: 100 INJECTION, SOLUTION INTRAVENOUS; SUBCUTANEOUS at 12:08

## 2022-12-30 RX ADMIN — CARVEDILOL 25 MG: 25 TABLET, FILM COATED ORAL at 06:10

## 2022-12-30 RX ADMIN — INSULIN LISPRO 4 UNITS: 100 INJECTION, SOLUTION INTRAVENOUS; SUBCUTANEOUS at 07:30

## 2022-12-30 RX ADMIN — HYDROCODONE BITARTRATE AND ACETAMINOPHEN 1 TABLET: 5; 325 TABLET ORAL at 12:22

## 2022-12-30 RX ADMIN — EPOETIN ALFA-EPBX 10000 UNITS: 10000 INJECTION, SOLUTION INTRAVENOUS; SUBCUTANEOUS at 13:29

## 2022-12-30 RX ADMIN — Medication 10 ML: at 09:52

## 2022-12-30 RX ADMIN — LEVOTHYROXINE SODIUM 275 MCG: 0.17 TABLET ORAL at 06:08

## 2022-12-30 RX ADMIN — LACTULOSE 10 G: 10 SOLUTION ORAL at 20:40

## 2022-12-30 RX ADMIN — ASPIRIN 81 MG: 81 TABLET, COATED ORAL at 14:14

## 2022-12-30 RX ADMIN — SODIUM CHLORIDE 9 ML/HR: 9 INJECTION, SOLUTION INTRAVENOUS at 07:27

## 2022-12-30 RX ADMIN — Medication 10 ML: at 20:39

## 2022-12-30 RX ADMIN — FAMOTIDINE 20 MG: 10 INJECTION INTRAVENOUS at 07:25

## 2022-12-30 RX ADMIN — Medication 100 MCG: at 07:47

## 2022-12-30 RX ADMIN — HYDROCODONE BITARTRATE AND ACETAMINOPHEN 1 TABLET: 5; 325 TABLET ORAL at 00:02

## 2022-12-30 RX ADMIN — APIXABAN 2.5 MG: 2.5 TABLET, FILM COATED ORAL at 20:40

## 2022-12-30 RX ADMIN — SODIUM CHLORIDE 250 ML: 9 INJECTION, SOLUTION INTRAVENOUS at 19:32

## 2022-12-30 RX ADMIN — HYDROMORPHONE HYDROCHLORIDE 0.25 MG: 1 INJECTION, SOLUTION INTRAMUSCULAR; INTRAVENOUS; SUBCUTANEOUS at 04:36

## 2022-12-30 RX ADMIN — ONDANSETRON 4 MG: 2 INJECTION INTRAMUSCULAR; INTRAVENOUS at 08:11

## 2022-12-30 RX ADMIN — CEFAZOLIN SODIUM 2 G: 2 INJECTION, SOLUTION INTRAVENOUS at 07:33

## 2022-12-30 RX ADMIN — HYDROMORPHONE HYDROCHLORIDE 0.25 MG: 1 INJECTION, SOLUTION INTRAMUSCULAR; INTRAVENOUS; SUBCUTANEOUS at 01:47

## 2022-12-30 RX ADMIN — INSULIN LISPRO 2 UNITS: 100 INJECTION, SOLUTION INTRAVENOUS; SUBCUTANEOUS at 18:11

## 2022-12-30 RX ADMIN — CARVEDILOL 25 MG: 25 TABLET, FILM COATED ORAL at 18:09

## 2022-12-30 RX ADMIN — ACETAMINOPHEN 650 MG: 325 TABLET, FILM COATED ORAL at 13:22

## 2022-12-30 RX ADMIN — LIDOCAINE HYDROCHLORIDE 100 MG: 20 INJECTION, SOLUTION INFILTRATION; PERINEURAL at 07:49

## 2022-12-30 RX ADMIN — INSULIN GLARGINE-YFGN 8 UNITS: 100 INJECTION, SOLUTION SUBCUTANEOUS at 21:01

## 2022-12-30 RX ADMIN — LISINOPRIL 10 MG: 10 TABLET ORAL at 20:40

## 2022-12-30 RX ADMIN — CARVEDILOL 25 MG: 25 TABLET, FILM COATED ORAL at 06:09

## 2022-12-30 RX ADMIN — DOCUSATE SODIUM 50MG AND SENNOSIDES 8.6MG 2 TABLET: 8.6; 5 TABLET, FILM COATED ORAL at 20:40

## 2022-12-30 RX ADMIN — FENTANYL CITRATE 25 MCG: 50 INJECTION, SOLUTION INTRAMUSCULAR; INTRAVENOUS at 08:31

## 2022-12-30 RX ADMIN — PREDNISONE 40 MG: 20 TABLET ORAL at 18:05

## 2022-12-30 RX ADMIN — ROCURONIUM BROMIDE 30 MG: 10 INJECTION, SOLUTION INTRAVENOUS at 07:49

## 2022-12-30 RX ADMIN — SUGAMMADEX 200 MG: 100 INJECTION, SOLUTION INTRAVENOUS at 08:09

## 2022-12-30 RX ADMIN — PROPOFOL 150 MG: 10 INJECTION, EMULSION INTRAVENOUS at 07:49

## 2022-12-30 NOTE — ANESTHESIA POSTPROCEDURE EVALUATION
"Patient: Zaina Martinez    Procedure Summary     Date: 12/30/22 Room / Location: [facility] OR  / [facility] MAIN OR    Anesthesia Start: 0741 Anesthesia Stop: 0827    Procedure: CYSTOSCOPY WITH LEFT  URETEROSCOPY  LEFT STENT EXCHANGE (Left) Diagnosis:     Surgeons: Bryant Phan Jr., MD Provider: Dinesh Gates MD    Anesthesia Type: general ASA Status: 4          Anesthesia Type: general    Vitals  Vitals Value Taken Time   /70 12/30/22 0831   Temp 37.2 °C (99 °F) 12/30/22 0823   Pulse 64 12/30/22 0847   Resp 14 12/30/22 0830   SpO2 99 % 12/30/22 0847   Vitals shown include unvalidated device data.        Post Anesthesia Care and Evaluation    Patient location during evaluation: bedside  Patient participation: complete - patient participated  Level of consciousness: awake and alert  Pain management: adequate    Airway patency: patent  Anesthetic complications: No anesthetic complications    Cardiovascular status: acceptable  Respiratory status: acceptable  Hydration status: acceptable    Comments: /70   Pulse 62   Temp 37.2 °C (99 °F) (Oral)   Resp 14   Ht 157.5 cm (62.01\")   Wt 59.3 kg (130 lb 12.8 oz)   SpO2 98%   BMI 23.92 kg/m²       "

## 2022-12-30 NOTE — ANESTHESIA PREPROCEDURE EVALUATION
Anesthesia Evaluation     Patient summary reviewed and Nursing notes reviewed   no history of anesthetic complications:  NPO Solid Status: > 8 hours  NPO Liquid Status: > 2 hours           Airway   Mallampati: II  TM distance: >3 FB  Neck ROM: full  No difficulty expected and Small opening  Dental    (+) poor dentition and implants        Pulmonary - normal exam   (+) pneumonia , pleural effusion (right pleural effusion), shortness of breath, recent URI,   Cardiovascular - normal exam  Exercise tolerance: poor (<4 METS)    ECG reviewed    (+) hypertension poorly controlled, valvular problems/murmurs murmur, past MI , CAD, dysrhythmias Atrial Flutter, CHF , pericardial effusion, hyperlipidemia,       Neuro/Psych  (+) CVA, psychiatric history Depression,    GI/Hepatic/Renal/Endo    (+)  GERD,  renal disease (last HD yesterday) ESRD, diabetes mellitus (A1c ~9) type 2 poorly controlled, thyroid problem hypothyroidism    Musculoskeletal     Abdominal    Substance History      OB/GYN          Other   arthritis, blood dyscrasia anemia,     ROS/Med Hx Other: •  Left ventricular systolic function is low normal. Left ventricular ejection fraction appears to be 51 - 55%.  •  Left ventricular diastolic function is consistent with (grade II w/high LAP) pseudonormalization.  •  Mildly reduced right ventricular systolic function noted.  •  The right ventricular cavity is mild to moderately dilated.  •  The right atrial cavity is moderately  dilated.  •  Moderate mitral valve regurgitation is present.  •  Severe tricuspid valve regurgitation is present.  •  Estimated right ventricular systolic pressure from tricuspid regurgitation is mildly elevated (35-45 mmHg).  •  There is a large left and right pleural effusion.                  Anesthesia Plan    ASA 4     general     (GETA    I have reviewed the patient's history and chart with the patient, including all pertinent laboratory results and imaging. I have explained the risks of  "anesthesia including but not limited to dental damage, corneal abrasion, nerve injury, MI, stroke, aspiration, and death. Patient has agreed to proceed.     /76 (BP Location: Right arm, Patient Position: Lying)   Pulse 74   Temp 37.1 °C (98.7 °F) (Oral)   Resp 16   Ht 157.5 cm (62.01\")   Wt 59.3 kg (130 lb 12.8 oz)   SpO2 100%   BMI 23.92 kg/m²   )  intravenous induction     Anesthetic plan, risks, benefits, and alternatives have been provided, discussed and informed consent has been obtained with: patient.        CODE STATUS:    Code Status (Patient has no pulse and is not breathing): CPR (Attempt to Resuscitate)  Medical Interventions (Patient has pulse or is breathing): Full Support      "

## 2022-12-31 ENCOUNTER — APPOINTMENT (OUTPATIENT)
Dept: GENERAL RADIOLOGY | Facility: HOSPITAL | Age: 57
DRG: 270 | End: 2022-12-31
Payer: MEDICARE

## 2022-12-31 ENCOUNTER — APPOINTMENT (OUTPATIENT)
Dept: CT IMAGING | Facility: HOSPITAL | Age: 57
DRG: 270 | End: 2022-12-31
Payer: MEDICARE

## 2022-12-31 PROBLEM — D62 ACUTE POSTHEMORRHAGIC ANEMIA: Status: ACTIVE | Noted: 2022-12-31

## 2022-12-31 PROBLEM — S37.019A RENAL HEMATOMA: Status: ACTIVE | Noted: 2022-12-31

## 2022-12-31 LAB
ABO GROUP BLD: NORMAL
ALBUMIN SERPL-MCNC: 3.2 G/DL (ref 3.5–5.2)
ALBUMIN/GLOB SERPL: 1.1 G/DL
ALP SERPL-CCNC: 118 U/L (ref 39–117)
ALT SERPL W P-5'-P-CCNC: 12 U/L (ref 1–33)
ANION GAP SERPL CALCULATED.3IONS-SCNC: 18.8 MMOL/L (ref 5–15)
APTT PPP: 33.4 SECONDS (ref 22.7–35.4)
ARTERIAL PATENCY WRIST A: ABNORMAL
AST SERPL-CCNC: 28 U/L (ref 1–32)
ATMOSPHERIC PRESS: 747.3 MMHG
BACTERIA SPEC AEROBE CULT: NORMAL
BACTERIA SPEC AEROBE CULT: NORMAL
BASE EXCESS BLDA CALC-SCNC: -1.5 MMOL/L (ref 0–2)
BASOPHILS # BLD AUTO: 0.02 10*3/MM3 (ref 0–0.2)
BASOPHILS NFR BLD AUTO: 0.1 % (ref 0–1.5)
BDY SITE: ABNORMAL
BH BB BLOOD EXPIRATION DATE: NORMAL
BH BB BLOOD TYPE BARCODE: 5100
BH BB BLOOD TYPE BARCODE: 6200
BH BB BLOOD TYPE BARCODE: 6200
BH BB BLOOD TYPE BARCODE: 9500
BH BB DISPENSE STATUS: NORMAL
BH BB PRODUCT CODE: NORMAL
BH BB UNIT NUMBER: NORMAL
BILIRUB SERPL-MCNC: 0.6 MG/DL (ref 0–1.2)
BLD GP AB SCN SERPL QL: NEGATIVE
BUN SERPL-MCNC: 34 MG/DL (ref 6–20)
BUN/CREAT SERPL: 10 (ref 7–25)
CALCIUM SPEC-SCNC: 8.5 MG/DL (ref 8.6–10.5)
CHLORIDE SERPL-SCNC: 96 MMOL/L (ref 98–107)
CO2 SERPL-SCNC: 19.2 MMOL/L (ref 22–29)
CREAT SERPL-MCNC: 3.39 MG/DL (ref 0.57–1)
CROSSMATCH INTERPRETATION: NORMAL
D DIMER PPP FEU-MCNC: 2.49 MCGFEU/ML (ref 0–0.57)
D-LACTATE SERPL-SCNC: 1.4 MMOL/L (ref 0.5–2)
D-LACTATE SERPL-SCNC: 2.3 MMOL/L (ref 0.5–2)
D-LACTATE SERPL-SCNC: 3.4 MMOL/L (ref 0.5–2)
D-LACTATE SERPL-SCNC: 7.2 MMOL/L (ref 0.5–2)
D-LACTATE SERPL-SCNC: 7.9 MMOL/L (ref 0.5–2)
DEPRECATED RDW RBC AUTO: 49.9 FL (ref 37–54)
DEPRECATED RDW RBC AUTO: 50 FL (ref 37–54)
DEPRECATED RDW RBC AUTO: 50.3 FL (ref 37–54)
DEPRECATED RDW RBC AUTO: 51.1 FL (ref 37–54)
DEPRECATED RDW RBC AUTO: 52.9 FL (ref 37–54)
DEPRECATED RDW RBC AUTO: 53.7 FL (ref 37–54)
EGFRCR SERPLBLD CKD-EPI 2021: 15.2 ML/MIN/1.73
EOSINOPHIL # BLD AUTO: 0 10*3/MM3 (ref 0–0.4)
EOSINOPHIL NFR BLD AUTO: 0 % (ref 0.3–6.2)
ERYTHROCYTE [DISTWIDTH] IN BLOOD BY AUTOMATED COUNT: 14.6 % (ref 12.3–15.4)
ERYTHROCYTE [DISTWIDTH] IN BLOOD BY AUTOMATED COUNT: 14.7 % (ref 12.3–15.4)
ERYTHROCYTE [DISTWIDTH] IN BLOOD BY AUTOMATED COUNT: 14.8 % (ref 12.3–15.4)
ERYTHROCYTE [DISTWIDTH] IN BLOOD BY AUTOMATED COUNT: 15.9 % (ref 12.3–15.4)
ERYTHROCYTE [DISTWIDTH] IN BLOOD BY AUTOMATED COUNT: 16.2 % (ref 12.3–15.4)
ERYTHROCYTE [DISTWIDTH] IN BLOOD BY AUTOMATED COUNT: 17.1 % (ref 12.3–15.4)
FIBRINOGEN PPP-MCNC: 369 MG/DL (ref 219–464)
GLOBULIN UR ELPH-MCNC: 2.8 GM/DL
GLUCOSE BLDC GLUCOMTR-MCNC: 115 MG/DL (ref 70–130)
GLUCOSE BLDC GLUCOMTR-MCNC: 123 MG/DL (ref 70–130)
GLUCOSE BLDC GLUCOMTR-MCNC: 154 MG/DL (ref 70–130)
GLUCOSE BLDC GLUCOMTR-MCNC: 187 MG/DL (ref 70–130)
GLUCOSE BLDC GLUCOMTR-MCNC: 94 MG/DL (ref 70–130)
GLUCOSE SERPL-MCNC: 153 MG/DL (ref 65–99)
HCO3 BLDA-SCNC: 22.9 MMOL/L (ref 22–28)
HCT VFR BLD AUTO: 13.7 % (ref 34–46.6)
HCT VFR BLD AUTO: 22 % (ref 34–46.6)
HCT VFR BLD AUTO: 22.8 % (ref 34–46.6)
HCT VFR BLD AUTO: 23.9 % (ref 34–46.6)
HCT VFR BLD AUTO: 25.6 % (ref 34–46.6)
HCT VFR BLD AUTO: 26.7 % (ref 34–46.6)
HCT VFR BLD AUTO: 9.8 % (ref 34–46.6)
HGB BLD-MCNC: 3.3 G/DL (ref 12–15.9)
HGB BLD-MCNC: 4.4 G/DL (ref 12–15.9)
HGB BLD-MCNC: 7.2 G/DL (ref 12–15.9)
HGB BLD-MCNC: 7.5 G/DL (ref 12–15.9)
HGB BLD-MCNC: 7.9 G/DL (ref 12–15.9)
HGB BLD-MCNC: 8.5 G/DL (ref 12–15.9)
HGB BLD-MCNC: 8.7 G/DL (ref 12–15.9)
IMM GRANULOCYTES # BLD AUTO: 0.22 10*3/MM3 (ref 0–0.05)
IMM GRANULOCYTES NFR BLD AUTO: 1 % (ref 0–0.5)
INR PPP: 1.31 (ref 0.9–1.1)
LYMPHOCYTES # BLD AUTO: 0.38 10*3/MM3 (ref 0.7–3.1)
LYMPHOCYTES # BLD MANUAL: 0.33 10*3/MM3 (ref 0.7–3.1)
LYMPHOCYTES NFR BLD AUTO: 1.7 % (ref 19.6–45.3)
LYMPHOCYTES NFR BLD MANUAL: 2 % (ref 5–12)
MCH RBC QN AUTO: 28.4 PG (ref 26.6–33)
MCH RBC QN AUTO: 29.3 PG (ref 26.6–33)
MCH RBC QN AUTO: 30.1 PG (ref 26.6–33)
MCH RBC QN AUTO: 30.4 PG (ref 26.6–33)
MCH RBC QN AUTO: 30.4 PG (ref 26.6–33)
MCH RBC QN AUTO: 30.9 PG (ref 26.6–33)
MCHC RBC AUTO-ENTMCNC: 31.8 G/DL (ref 31.5–35.7)
MCHC RBC AUTO-ENTMCNC: 32.1 G/DL (ref 31.5–35.7)
MCHC RBC AUTO-ENTMCNC: 32.7 G/DL (ref 31.5–35.7)
MCHC RBC AUTO-ENTMCNC: 32.9 G/DL (ref 31.5–35.7)
MCHC RBC AUTO-ENTMCNC: 33.1 G/DL (ref 31.5–35.7)
MCHC RBC AUTO-ENTMCNC: 33.7 G/DL (ref 31.5–35.7)
MCV RBC AUTO: 84.5 FL (ref 79–97)
MCV RBC AUTO: 91.3 FL (ref 79–97)
MCV RBC AUTO: 91.6 FL (ref 79–97)
MCV RBC AUTO: 92.8 FL (ref 79–97)
MCV RBC AUTO: 93.4 FL (ref 79–97)
MCV RBC AUTO: 95.4 FL (ref 79–97)
MODALITY: ABNORMAL
MONOCYTES # BLD AUTO: 1.05 10*3/MM3 (ref 0.1–0.9)
MONOCYTES # BLD: 0.66 10*3/MM3 (ref 0.1–0.9)
MONOCYTES NFR BLD AUTO: 4.8 % (ref 5–12)
NEUTROPHILS # BLD AUTO: 32.19 10*3/MM3 (ref 1.7–7)
NEUTROPHILS NFR BLD AUTO: 20.42 10*3/MM3 (ref 1.7–7)
NEUTROPHILS NFR BLD AUTO: 92.4 % (ref 42.7–76)
NEUTROPHILS NFR BLD MANUAL: 97 % (ref 42.7–76)
NRBC BLD AUTO-RTO: 0 /100 WBC (ref 0–0.2)
NRBC SPEC MANUAL: 1 /100 WBC (ref 0–0.2)
PCO2 BLDA: 35.2 MM HG (ref 35–45)
PH BLDA: 7.42 PH UNITS (ref 7.35–7.45)
PLAT MORPH BLD: NORMAL
PLATELET # BLD AUTO: 201 10*3/MM3 (ref 140–450)
PLATELET # BLD AUTO: 206 10*3/MM3 (ref 140–450)
PLATELET # BLD AUTO: 221 10*3/MM3 (ref 140–450)
PLATELET # BLD AUTO: 224 10*3/MM3 (ref 140–450)
PLATELET # BLD AUTO: 274 10*3/MM3 (ref 140–450)
PLATELET # BLD AUTO: 282 10*3/MM3 (ref 140–450)
PMV BLD AUTO: 10.1 FL (ref 6–12)
PMV BLD AUTO: 10.2 FL (ref 6–12)
PMV BLD AUTO: 10.4 FL (ref 6–12)
PMV BLD AUTO: 10.6 FL (ref 6–12)
PMV BLD AUTO: 11.1 FL (ref 6–12)
PMV BLD AUTO: 9.9 FL (ref 6–12)
PO2 BLDA: 75.3 MM HG (ref 80–100)
POTASSIUM SERPL-SCNC: 5.5 MMOL/L (ref 3.5–5.2)
PROT SERPL-MCNC: 6 G/DL (ref 6–8.5)
PROTHROMBIN TIME: 16.5 SECONDS (ref 11.7–14.2)
RBC # BLD AUTO: 1.16 10*6/MM3 (ref 3.77–5.28)
RBC # BLD AUTO: 1.5 10*6/MM3 (ref 3.77–5.28)
RBC # BLD AUTO: 2.37 10*6/MM3 (ref 3.77–5.28)
RBC # BLD AUTO: 2.49 10*6/MM3 (ref 3.77–5.28)
RBC # BLD AUTO: 2.56 10*6/MM3 (ref 3.77–5.28)
RBC # BLD AUTO: 2.8 10*6/MM3 (ref 3.77–5.28)
RBC MORPH BLD: NORMAL
RH BLD: POSITIVE
SAO2 % BLDCOA: 95.3 % (ref 92–99)
SODIUM SERPL-SCNC: 134 MMOL/L (ref 136–145)
T&S EXPIRATION DATE: NORMAL
THROMBIN TIME: 15.8 SECONDS (ref 15.7–20.4)
TOTAL RATE: 10 BREATHS/MINUTE
UNIT  ABO: NORMAL
UNIT  RH: NORMAL
VARIANT LYMPHS NFR BLD MANUAL: 1 % (ref 19.6–45.3)
WBC MORPH BLD: NORMAL
WBC NRBC COR # BLD: 19.25 10*3/MM3 (ref 3.4–10.8)
WBC NRBC COR # BLD: 20.21 10*3/MM3 (ref 3.4–10.8)
WBC NRBC COR # BLD: 20.56 10*3/MM3 (ref 3.4–10.8)
WBC NRBC COR # BLD: 22.09 10*3/MM3 (ref 3.4–10.8)
WBC NRBC COR # BLD: 33.19 10*3/MM3 (ref 3.4–10.8)
WBC NRBC COR # BLD: 37.69 10*3/MM3 (ref 3.4–10.8)

## 2022-12-31 PROCEDURE — 86900 BLOOD TYPING SEROLOGIC ABO: CPT

## 2022-12-31 PROCEDURE — 63710000001 INSULIN LISPRO (HUMAN) PER 5 UNITS: Performed by: UROLOGY

## 2022-12-31 PROCEDURE — P9016 RBC LEUKOCYTES REDUCED: HCPCS

## 2022-12-31 PROCEDURE — 94799 UNLISTED PULMONARY SVC/PX: CPT

## 2022-12-31 PROCEDURE — 85610 PROTHROMBIN TIME: CPT

## 2022-12-31 PROCEDURE — 36600 WITHDRAWAL OF ARTERIAL BLOOD: CPT

## 2022-12-31 PROCEDURE — 25010000002 ONDANSETRON PER 1 MG: Performed by: UROLOGY

## 2022-12-31 PROCEDURE — 82803 BLOOD GASES ANY COMBINATION: CPT

## 2022-12-31 PROCEDURE — 25010000002 PROTHROMBIN COMPLEX CONC HUMAN 1000 UNITS KIT: Performed by: INTERNAL MEDICINE

## 2022-12-31 PROCEDURE — 06HY33Z INSERTION OF INFUSION DEVICE INTO LOWER VEIN, PERCUTANEOUS APPROACH: ICD-10-PCS | Performed by: INTERNAL MEDICINE

## 2022-12-31 PROCEDURE — 85007 BL SMEAR W/DIFF WBC COUNT: CPT | Performed by: UROLOGY

## 2022-12-31 PROCEDURE — 86923 COMPATIBILITY TEST ELECTRIC: CPT

## 2022-12-31 PROCEDURE — 85384 FIBRINOGEN ACTIVITY: CPT

## 2022-12-31 PROCEDURE — 99223 1ST HOSP IP/OBS HIGH 75: CPT | Performed by: INTERNAL MEDICINE

## 2022-12-31 PROCEDURE — 85014 HEMATOCRIT: CPT | Performed by: INTERNAL MEDICINE

## 2022-12-31 PROCEDURE — 85670 THROMBIN TIME PLASMA: CPT

## 2022-12-31 PROCEDURE — C1751 CATH, INF, PER/CENT/MIDLINE: HCPCS

## 2022-12-31 PROCEDURE — 85730 THROMBOPLASTIN TIME PARTIAL: CPT

## 2022-12-31 PROCEDURE — 80053 COMPREHEN METABOLIC PANEL: CPT | Performed by: UROLOGY

## 2022-12-31 PROCEDURE — 74176 CT ABD & PELVIS W/O CONTRAST: CPT

## 2022-12-31 PROCEDURE — 25010000002 HYDROMORPHONE PER 4 MG: Performed by: INTERNAL MEDICINE

## 2022-12-31 PROCEDURE — P9100 PATHOGEN TEST FOR PLATELETS: HCPCS

## 2022-12-31 PROCEDURE — 25010000002 NALOXONE PER 1 MG: Performed by: UROLOGY

## 2022-12-31 PROCEDURE — 25010000002 HEPARIN (PORCINE) PER 1000 UNITS: Performed by: UROLOGY

## 2022-12-31 PROCEDURE — 85379 FIBRIN DEGRADATION QUANT: CPT

## 2022-12-31 PROCEDURE — P9059 PLASMA, FRZ BETWEEN 8-24HOUR: HCPCS

## 2022-12-31 PROCEDURE — 86901 BLOOD TYPING SEROLOGIC RH(D): CPT | Performed by: NURSE PRACTITIONER

## 2022-12-31 PROCEDURE — 86850 RBC ANTIBODY SCREEN: CPT | Performed by: NURSE PRACTITIONER

## 2022-12-31 PROCEDURE — 86927 PLASMA FRESH FROZEN: CPT

## 2022-12-31 PROCEDURE — 82962 GLUCOSE BLOOD TEST: CPT

## 2022-12-31 PROCEDURE — 85018 HEMOGLOBIN: CPT | Performed by: INTERNAL MEDICINE

## 2022-12-31 PROCEDURE — 85027 COMPLETE CBC AUTOMATED: CPT | Performed by: INTERNAL MEDICINE

## 2022-12-31 PROCEDURE — 74018 RADEX ABDOMEN 1 VIEW: CPT

## 2022-12-31 PROCEDURE — 85025 COMPLETE CBC W/AUTO DIFF WBC: CPT

## 2022-12-31 PROCEDURE — 83605 ASSAY OF LACTIC ACID: CPT | Performed by: NURSE PRACTITIONER

## 2022-12-31 PROCEDURE — 85025 COMPLETE CBC W/AUTO DIFF WBC: CPT | Performed by: UROLOGY

## 2022-12-31 PROCEDURE — P9035 PLATELET PHERES LEUKOREDUCED: HCPCS

## 2022-12-31 PROCEDURE — 86900 BLOOD TYPING SEROLOGIC ABO: CPT | Performed by: NURSE PRACTITIONER

## 2022-12-31 RX ORDER — NALOXONE HCL 0.4 MG/ML
0.4 VIAL (ML) INJECTION
Status: DISCONTINUED | OUTPATIENT
Start: 2022-12-31 | End: 2023-01-29 | Stop reason: HOSPADM

## 2022-12-31 RX ORDER — NOREPINEPHRINE BIT/0.9 % NACL 8 MG/250ML
.02-.3 INFUSION BOTTLE (ML) INTRAVENOUS
Status: DISCONTINUED | OUTPATIENT
Start: 2022-12-31 | End: 2023-01-03

## 2022-12-31 RX ORDER — LABETALOL HYDROCHLORIDE 5 MG/ML
20 INJECTION, SOLUTION INTRAVENOUS EVERY 6 HOURS PRN
Status: DISCONTINUED | OUTPATIENT
Start: 2022-12-31 | End: 2023-01-15

## 2022-12-31 RX ORDER — HYDROMORPHONE HYDROCHLORIDE 1 MG/ML
0.5 INJECTION, SOLUTION INTRAMUSCULAR; INTRAVENOUS; SUBCUTANEOUS
Status: DISCONTINUED | OUTPATIENT
Start: 2022-12-31 | End: 2023-01-11

## 2022-12-31 RX ORDER — NOREPINEPHRINE BIT/0.9 % NACL 8 MG/250ML
INFUSION BOTTLE (ML) INTRAVENOUS
Status: COMPLETED
Start: 2022-12-31 | End: 2022-12-31

## 2022-12-31 RX ORDER — PANTOPRAZOLE SODIUM 40 MG/10ML
80 INJECTION, POWDER, LYOPHILIZED, FOR SOLUTION INTRAVENOUS ONCE
Status: COMPLETED | OUTPATIENT
Start: 2022-12-31 | End: 2022-12-31

## 2022-12-31 RX ORDER — HYDROMORPHONE HYDROCHLORIDE 1 MG/ML
0.5 INJECTION, SOLUTION INTRAMUSCULAR; INTRAVENOUS; SUBCUTANEOUS ONCE
Status: COMPLETED | OUTPATIENT
Start: 2022-12-31 | End: 2022-12-31

## 2022-12-31 RX ADMIN — Medication 0.02 MCG/KG/MIN: at 05:30

## 2022-12-31 RX ADMIN — ONDANSETRON 4 MG: 2 INJECTION INTRAMUSCULAR; INTRAVENOUS at 08:40

## 2022-12-31 RX ADMIN — HEPARIN SODIUM 3800 UNITS: 1000 INJECTION INTRAVENOUS; SUBCUTANEOUS at 15:13

## 2022-12-31 RX ADMIN — NALOXONE HYDROCHLORIDE 0.2 MG: 0.4 INJECTION, SOLUTION INTRAMUSCULAR; INTRAVENOUS; SUBCUTANEOUS at 05:30

## 2022-12-31 RX ADMIN — PANTOPRAZOLE SODIUM 80 MG: 40 INJECTION, POWDER, FOR SOLUTION INTRAVENOUS at 06:57

## 2022-12-31 RX ADMIN — Medication 10 ML: at 21:07

## 2022-12-31 RX ADMIN — NALOXONE HYDROCHLORIDE 0.2 MG: 0.4 INJECTION, SOLUTION INTRAMUSCULAR; INTRAVENOUS; SUBCUTANEOUS at 01:05

## 2022-12-31 RX ADMIN — INSULIN LISPRO 2 UNITS: 100 INJECTION, SOLUTION INTRAVENOUS; SUBCUTANEOUS at 13:27

## 2022-12-31 RX ADMIN — PANTOPRAZOLE SODIUM 40 MG: 40 INJECTION, POWDER, FOR SOLUTION INTRAVENOUS at 06:57

## 2022-12-31 RX ADMIN — DOCUSATE SODIUM 50MG AND SENNOSIDES 8.6MG 2 TABLET: 8.6; 5 TABLET, FILM COATED ORAL at 21:07

## 2022-12-31 RX ADMIN — LACTULOSE 10 G: 10 SOLUTION ORAL at 21:07

## 2022-12-31 RX ADMIN — PANTOPRAZOLE SODIUM 40 MG: 40 INJECTION, POWDER, FOR SOLUTION INTRAVENOUS at 07:13

## 2022-12-31 RX ADMIN — HYDROMORPHONE HYDROCHLORIDE 0.5 MG: 1 INJECTION, SOLUTION INTRAMUSCULAR; INTRAVENOUS; SUBCUTANEOUS at 14:19

## 2022-12-31 RX ADMIN — INSULIN LISPRO 2 UNITS: 100 INJECTION, SOLUTION INTRAVENOUS; SUBCUTANEOUS at 09:32

## 2022-12-31 RX ADMIN — LIDOCAINE 2 PATCH: 700 PATCH TOPICAL at 09:26

## 2022-12-31 RX ADMIN — INSULIN GLARGINE-YFGN 8 UNITS: 100 INJECTION, SOLUTION SUBCUTANEOUS at 21:07

## 2022-12-31 RX ADMIN — HYDROMORPHONE HYDROCHLORIDE 0.5 MG: 1 INJECTION, SOLUTION INTRAMUSCULAR; INTRAVENOUS; SUBCUTANEOUS at 21:18

## 2022-12-31 RX ADMIN — HYDROCODONE BITARTRATE AND ACETAMINOPHEN 1 TABLET: 5; 325 TABLET ORAL at 09:26

## 2022-12-31 RX ADMIN — HYDROMORPHONE HYDROCHLORIDE 0.5 MG: 1 INJECTION, SOLUTION INTRAMUSCULAR; INTRAVENOUS; SUBCUTANEOUS at 11:46

## 2022-12-31 RX ADMIN — LABETALOL HYDROCHLORIDE 20 MG: 5 INJECTION, SOLUTION INTRAVENOUS at 17:36

## 2022-12-31 RX ADMIN — HYDROMORPHONE HYDROCHLORIDE 0.5 MG: 1 INJECTION, SOLUTION INTRAMUSCULAR; INTRAVENOUS; SUBCUTANEOUS at 16:26

## 2022-12-31 RX ADMIN — PROTHROMBIN, COAGULATION FACTOR VII HUMAN, COAGULATION FACTOR IX HUMAN, COAGULATION FACTOR X HUMAN, PROTEIN C, PROTEIN S HUMAN, AND WATER 3374 UNITS: KIT at 11:13

## 2022-12-31 RX ADMIN — ACETAMINOPHEN 650 MG: 325 TABLET, FILM COATED ORAL at 10:19

## 2022-12-31 RX ADMIN — Medication 10 ML: at 09:30

## 2023-01-01 ENCOUNTER — APPOINTMENT (OUTPATIENT)
Dept: GENERAL RADIOLOGY | Facility: HOSPITAL | Age: 58
DRG: 100 | End: 2023-01-01
Payer: MEDICARE

## 2023-01-01 ENCOUNTER — APPOINTMENT (OUTPATIENT)
Dept: NEUROLOGY | Facility: HOSPITAL | Age: 58
DRG: 100 | End: 2023-01-01
Payer: MEDICARE

## 2023-01-01 ENCOUNTER — HOSPITAL ENCOUNTER (INPATIENT)
Facility: HOSPITAL | Age: 58
LOS: 4 days | DRG: 100 | End: 2023-05-07
Attending: EMERGENCY MEDICINE | Admitting: HOSPITALIST
Payer: MEDICARE

## 2023-01-01 ENCOUNTER — APPOINTMENT (OUTPATIENT)
Dept: GENERAL RADIOLOGY | Facility: HOSPITAL | Age: 58
DRG: 270 | End: 2023-01-01
Payer: MEDICARE

## 2023-01-01 ENCOUNTER — ANESTHESIA (OUTPATIENT)
Dept: PERIOP | Facility: HOSPITAL | Age: 58
DRG: 270 | End: 2023-01-01
Payer: MEDICARE

## 2023-01-01 ENCOUNTER — ANESTHESIA EVENT (OUTPATIENT)
Dept: PERIOP | Facility: HOSPITAL | Age: 58
DRG: 270 | End: 2023-01-01
Payer: MEDICARE

## 2023-01-01 ENCOUNTER — APPOINTMENT (OUTPATIENT)
Dept: CT IMAGING | Facility: HOSPITAL | Age: 58
DRG: 100 | End: 2023-01-01
Payer: MEDICARE

## 2023-01-01 VITALS
BODY MASS INDEX: 24.91 KG/M2 | WEIGHT: 135.36 LBS | TEMPERATURE: 99 F | SYSTOLIC BLOOD PRESSURE: 143 MMHG | DIASTOLIC BLOOD PRESSURE: 83 MMHG | HEIGHT: 62 IN

## 2023-01-01 DIAGNOSIS — R79.89 ELEVATED TSH: ICD-10-CM

## 2023-01-01 DIAGNOSIS — N39.0 ACUTE UTI: ICD-10-CM

## 2023-01-01 DIAGNOSIS — Z66 DO NOT RESUSCITATE: ICD-10-CM

## 2023-01-01 DIAGNOSIS — I16.0 HYPERTENSIVE URGENCY: ICD-10-CM

## 2023-01-01 DIAGNOSIS — R56.9 POSTICTAL STATE: ICD-10-CM

## 2023-01-01 DIAGNOSIS — R56.9 SEIZURE: Primary | ICD-10-CM

## 2023-01-01 DIAGNOSIS — R77.8 ELEVATED TROPONIN: ICD-10-CM

## 2023-01-01 DIAGNOSIS — Z99.2 END-STAGE RENAL DISEASE ON HEMODIALYSIS: ICD-10-CM

## 2023-01-01 DIAGNOSIS — E87.5 HYPERKALEMIA: ICD-10-CM

## 2023-01-01 DIAGNOSIS — N18.6 END-STAGE RENAL DISEASE ON HEMODIALYSIS: ICD-10-CM

## 2023-01-01 LAB
ALBUMIN SERPL-MCNC: 2.6 G/DL (ref 3.5–5.2)
ALBUMIN SERPL-MCNC: 3 G/DL (ref 3.5–5.2)
ALBUMIN SERPL-MCNC: 3.1 G/DL (ref 3.5–5.2)
ALBUMIN/GLOB SERPL: 0.7 G/DL
ALBUMIN/GLOB SERPL: 0.8 G/DL
ALBUMIN/GLOB SERPL: 1.1 G/DL
ALP SERPL-CCNC: 106 U/L (ref 39–117)
ALP SERPL-CCNC: 127 U/L (ref 39–117)
ALP SERPL-CCNC: 133 U/L (ref 39–117)
ALT SERPL W P-5'-P-CCNC: 10 U/L (ref 1–33)
ALT SERPL W P-5'-P-CCNC: 11 U/L (ref 1–33)
ALT SERPL W P-5'-P-CCNC: 8 U/L (ref 1–33)
AMPHET+METHAMPHET UR QL: NEGATIVE
ANION GAP SERPL CALCULATED.3IONS-SCNC: 12 MMOL/L (ref 5–15)
ANION GAP SERPL CALCULATED.3IONS-SCNC: 5 MMOL/L (ref 5–15)
ANION GAP SERPL CALCULATED.3IONS-SCNC: 6.7 MMOL/L (ref 5–15)
ANION GAP SERPL CALCULATED.3IONS-SCNC: 8 MMOL/L (ref 5–15)
ANISOCYTOSIS BLD QL: ABNORMAL
APTT PPP: 34.1 SECONDS (ref 22.7–35.4)
ARTERIAL PATENCY WRIST A: POSITIVE
AST SERPL-CCNC: 28 U/L (ref 1–32)
AST SERPL-CCNC: 34 U/L (ref 1–32)
AST SERPL-CCNC: 52 U/L (ref 1–32)
ATMOSPHERIC PRESS: 745.2 MMHG
BACTERIA ISLT: NORMAL
BACTERIA SPEC AEROBE CULT: NO GROWTH
BACTERIA UR QL AUTO: ABNORMAL /HPF
BARBITURATES UR QL SCN: NEGATIVE
BASE EXCESS BLDA CALC-SCNC: 6.9 MMOL/L (ref 0–2)
BASOPHILS # BLD AUTO: 0.03 10*3/MM3 (ref 0–0.2)
BASOPHILS # BLD AUTO: 0.03 10*3/MM3 (ref 0–0.2)
BASOPHILS NFR BLD AUTO: 0.2 % (ref 0–1.5)
BASOPHILS NFR BLD AUTO: 0.4 % (ref 0–1.5)
BDY SITE: ABNORMAL
BENZODIAZ UR QL SCN: NEGATIVE
BILIRUB SERPL-MCNC: 0.3 MG/DL (ref 0–1.2)
BILIRUB SERPL-MCNC: 0.4 MG/DL (ref 0–1.2)
BILIRUB SERPL-MCNC: 0.5 MG/DL (ref 0–1.2)
BILIRUB UR QL STRIP: NEGATIVE
BUN SERPL-MCNC: 19 MG/DL (ref 6–20)
BUN SERPL-MCNC: 22 MG/DL (ref 6–20)
BUN SERPL-MCNC: 22 MG/DL (ref 6–20)
BUN SERPL-MCNC: 25 MG/DL (ref 6–20)
BUN/CREAT SERPL: 10.1 (ref 7–25)
BUN/CREAT SERPL: 10.6 (ref 7–25)
BUN/CREAT SERPL: 10.6 (ref 7–25)
BUN/CREAT SERPL: 9 (ref 7–25)
CALCIUM SPEC-SCNC: 7.3 MG/DL (ref 8.6–10.5)
CALCIUM SPEC-SCNC: 8.3 MG/DL (ref 8.6–10.5)
CALCIUM SPEC-SCNC: 8.4 MG/DL (ref 8.6–10.5)
CALCIUM SPEC-SCNC: 8.5 MG/DL (ref 8.6–10.5)
CANNABINOIDS SERPL QL: POSITIVE
CHLORIDE SERPL-SCNC: 102 MMOL/L (ref 98–107)
CHLORIDE SERPL-SCNC: 105 MMOL/L (ref 98–107)
CHLORIDE SERPL-SCNC: 97 MMOL/L (ref 98–107)
CHLORIDE SERPL-SCNC: 98 MMOL/L (ref 98–107)
CHOLEST SERPL-MCNC: 102 MG/DL (ref 0–200)
CK SERPL-CCNC: 44 U/L (ref 20–180)
CLARITY UR: ABNORMAL
CO2 SERPL-SCNC: 25 MMOL/L (ref 22–29)
CO2 SERPL-SCNC: 25.3 MMOL/L (ref 22–29)
CO2 SERPL-SCNC: 29 MMOL/L (ref 22–29)
CO2 SERPL-SCNC: 30 MMOL/L (ref 22–29)
COCAINE UR QL: NEGATIVE
COLOR UR: YELLOW
CREAT SERPL-MCNC: 1.8 MG/DL (ref 0.57–1)
CREAT SERPL-MCNC: 2.07 MG/DL (ref 0.57–1)
CREAT SERPL-MCNC: 2.45 MG/DL (ref 0.57–1)
CREAT SERPL-MCNC: 2.48 MG/DL (ref 0.57–1)
D-LACTATE SERPL-SCNC: 1.2 MMOL/L (ref 0.5–2)
DEPRECATED RDW RBC AUTO: 46.2 FL (ref 37–54)
DEPRECATED RDW RBC AUTO: 51.7 FL (ref 37–54)
DEPRECATED RDW RBC AUTO: 52.8 FL (ref 37–54)
EGFRCR SERPLBLD CKD-EPI 2021: 22.1 ML/MIN/1.73
EGFRCR SERPLBLD CKD-EPI 2021: 22.5 ML/MIN/1.73
EGFRCR SERPLBLD CKD-EPI 2021: 27.5 ML/MIN/1.73
EGFRCR SERPLBLD CKD-EPI 2021: 32.5 ML/MIN/1.73
EOSINOPHIL # BLD AUTO: 0 10*3/MM3 (ref 0–0.4)
EOSINOPHIL # BLD AUTO: 0.07 10*3/MM3 (ref 0–0.4)
EOSINOPHIL NFR BLD AUTO: 0 % (ref 0.3–6.2)
EOSINOPHIL NFR BLD AUTO: 0.9 % (ref 0.3–6.2)
ERYTHROCYTE [DISTWIDTH] IN BLOOD BY AUTOMATED COUNT: 14.7 % (ref 12.3–15.4)
ERYTHROCYTE [DISTWIDTH] IN BLOOD BY AUTOMATED COUNT: 17.2 % (ref 12.3–15.4)
ERYTHROCYTE [DISTWIDTH] IN BLOOD BY AUTOMATED COUNT: 17.7 % (ref 12.3–15.4)
ETHANOL BLD-MCNC: <10 MG/DL (ref 0–10)
ETHANOL UR QL: <0.01 %
FENTANYL UR-MCNC: NEGATIVE NG/ML
GEN 5 2HR TROPONIN T REFLEX: 187 NG/L
GLOBULIN UR ELPH-MCNC: 2.8 GM/DL
GLOBULIN UR ELPH-MCNC: 3.6 GM/DL
GLOBULIN UR ELPH-MCNC: 3.8 GM/DL
GLUCOSE BLDC GLUCOMTR-MCNC: 100 MG/DL (ref 70–130)
GLUCOSE BLDC GLUCOMTR-MCNC: 106 MG/DL (ref 70–130)
GLUCOSE BLDC GLUCOMTR-MCNC: 112 MG/DL (ref 70–130)
GLUCOSE BLDC GLUCOMTR-MCNC: 119 MG/DL (ref 70–130)
GLUCOSE BLDC GLUCOMTR-MCNC: 120 MG/DL (ref 70–130)
GLUCOSE BLDC GLUCOMTR-MCNC: 122 MG/DL (ref 70–130)
GLUCOSE BLDC GLUCOMTR-MCNC: 23 MG/DL (ref 70–130)
GLUCOSE BLDC GLUCOMTR-MCNC: 44 MG/DL (ref 70–130)
GLUCOSE BLDC GLUCOMTR-MCNC: 60 MG/DL (ref 70–130)
GLUCOSE BLDC GLUCOMTR-MCNC: 73 MG/DL (ref 70–130)
GLUCOSE BLDC GLUCOMTR-MCNC: 79 MG/DL (ref 70–130)
GLUCOSE BLDC GLUCOMTR-MCNC: 82 MG/DL (ref 70–130)
GLUCOSE BLDC GLUCOMTR-MCNC: 86 MG/DL (ref 70–130)
GLUCOSE BLDC GLUCOMTR-MCNC: 90 MG/DL (ref 70–130)
GLUCOSE BLDC GLUCOMTR-MCNC: 90 MG/DL (ref 70–130)
GLUCOSE SERPL-MCNC: 120 MG/DL (ref 65–99)
GLUCOSE SERPL-MCNC: 24 MG/DL (ref 65–99)
GLUCOSE SERPL-MCNC: 53 MG/DL (ref 65–99)
GLUCOSE SERPL-MCNC: 85 MG/DL (ref 65–99)
GLUCOSE UR STRIP-MCNC: NEGATIVE MG/DL
HBA1C MFR BLD: 4.6 % (ref 4.8–5.6)
HBV SURFACE AG SERPL QL IA: NORMAL
HCO3 BLDA-SCNC: 31.7 MMOL/L (ref 22–28)
HCT VFR BLD AUTO: 20.2 % (ref 34–46.6)
HCT VFR BLD AUTO: 21.5 % (ref 34–46.6)
HCT VFR BLD AUTO: 23.6 % (ref 34–46.6)
HCT VFR BLD AUTO: 25.5 % (ref 34–46.6)
HCT VFR BLD AUTO: 30.8 % (ref 34–46.6)
HDLC SERPL-MCNC: 27 MG/DL (ref 40–60)
HGB BLD-MCNC: 10 G/DL (ref 12–15.9)
HGB BLD-MCNC: 7 G/DL (ref 12–15.9)
HGB BLD-MCNC: 7.4 G/DL (ref 12–15.9)
HGB BLD-MCNC: 7.8 G/DL (ref 12–15.9)
HGB BLD-MCNC: 8.5 G/DL (ref 12–15.9)
HGB UR QL STRIP.AUTO: ABNORMAL
HYALINE CASTS UR QL AUTO: ABNORMAL /LPF
HYPOCHROMIA BLD QL: ABNORMAL
INHALED O2 CONCENTRATION: 21 %
INR PPP: 0.94 (ref 0.9–1.1)
INR PPP: 1.24 (ref 0.9–1.1)
KETONES UR QL STRIP: NEGATIVE
LDLC SERPL CALC-MCNC: 50 MG/DL (ref 0–100)
LDLC/HDLC SERPL: 1.72 {RATIO}
LEUKOCYTE ESTERASE UR QL STRIP.AUTO: ABNORMAL
LYMPHOCYTES # BLD AUTO: 1.23 10*3/MM3 (ref 0.7–3.1)
LYMPHOCYTES # BLD AUTO: 1.46 10*3/MM3 (ref 0.7–3.1)
LYMPHOCYTES # BLD MANUAL: 0.54 10*3/MM3 (ref 0.7–3.1)
LYMPHOCYTES NFR BLD AUTO: 18.9 % (ref 19.6–45.3)
LYMPHOCYTES NFR BLD AUTO: 6.8 % (ref 19.6–45.3)
LYMPHOCYTES NFR BLD MANUAL: 4.1 % (ref 5–12)
MAGNESIUM SERPL-MCNC: 1.7 MG/DL (ref 1.6–2.6)
MCH RBC QN AUTO: 27.3 PG (ref 26.6–33)
MCH RBC QN AUTO: 27.7 PG (ref 26.6–33)
MCH RBC QN AUTO: 29.2 PG (ref 26.6–33)
MCHC RBC AUTO-ENTMCNC: 32.5 G/DL (ref 31.5–35.7)
MCHC RBC AUTO-ENTMCNC: 33.3 G/DL (ref 31.5–35.7)
MCHC RBC AUTO-ENTMCNC: 34.7 G/DL (ref 31.5–35.7)
MCV RBC AUTO: 83.1 FL (ref 79–97)
MCV RBC AUTO: 84.2 FL (ref 79–97)
MCV RBC AUTO: 84.2 FL (ref 79–97)
METHADONE UR QL SCN: NEGATIVE
MODALITY: ABNORMAL
MONOCYTES # BLD AUTO: 0.62 10*3/MM3 (ref 0.1–0.9)
MONOCYTES # BLD AUTO: 1.22 10*3/MM3 (ref 0.1–0.9)
MONOCYTES # BLD: 0.71 10*3/MM3 (ref 0.1–0.9)
MONOCYTES NFR BLD AUTO: 6.8 % (ref 5–12)
MONOCYTES NFR BLD AUTO: 8 % (ref 5–12)
MYELOCYTES NFR BLD MANUAL: 1 % (ref 0–0)
NEUTROPHILS # BLD AUTO: 15.98 10*3/MM3 (ref 1.7–7)
NEUTROPHILS NFR BLD AUTO: 15.38 10*3/MM3 (ref 1.7–7)
NEUTROPHILS NFR BLD AUTO: 5.5 10*3/MM3 (ref 1.7–7)
NEUTROPHILS NFR BLD AUTO: 71.4 % (ref 42.7–76)
NEUTROPHILS NFR BLD AUTO: 85.5 % (ref 42.7–76)
NEUTROPHILS NFR BLD MANUAL: 91.8 % (ref 42.7–76)
NITRITE UR QL STRIP: NEGATIVE
NRBC BLD AUTO-RTO: 0.9 /100 WBC (ref 0–0.2)
NT-PROBNP SERPL-MCNC: ABNORMAL PG/ML (ref 0–900)
O2 A-A PPRESDIFF RESPIRATORY: 0.8 MMHG
OPIATES UR QL: NEGATIVE
OXYCODONE UR QL SCN: NEGATIVE
PCO2 BLDA: 44.6 MM HG (ref 35–45)
PH BLDA: 7.46 PH UNITS (ref 7.35–7.45)
PH UR STRIP.AUTO: >=9 [PH] (ref 5–8)
PHOSPHATE SERPL-MCNC: 1.5 MG/DL (ref 2.5–4.5)
PHOSPHATE SERPL-MCNC: 3.6 MG/DL (ref 2.5–4.5)
PLAT MORPH BLD: NORMAL
PLATELET # BLD AUTO: 104 10*3/MM3 (ref 140–450)
PLATELET # BLD AUTO: 116 10*3/MM3 (ref 140–450)
PLATELET # BLD AUTO: 203 10*3/MM3 (ref 140–450)
PMV BLD AUTO: 10 FL (ref 6–12)
PMV BLD AUTO: 10.1 FL (ref 6–12)
PMV BLD AUTO: 10.6 FL (ref 6–12)
PO2 BLDA: 77.5 MM HG (ref 80–100)
POIKILOCYTOSIS BLD QL SMEAR: ABNORMAL
POLYCHROMASIA BLD QL SMEAR: ABNORMAL
POTASSIUM SERPL-SCNC: 3.9 MMOL/L (ref 3.5–5.2)
POTASSIUM SERPL-SCNC: 5.3 MMOL/L (ref 3.5–5.2)
POTASSIUM SERPL-SCNC: 5.5 MMOL/L (ref 3.5–5.2)
POTASSIUM SERPL-SCNC: 6 MMOL/L (ref 3.5–5.2)
POTASSIUM SERPL-SCNC: 6 MMOL/L (ref 3.5–5.2)
PROCALCITONIN SERPL-MCNC: 1.57 NG/ML (ref 0–0.25)
PROT SERPL-MCNC: 5.9 G/DL (ref 6–8.5)
PROT SERPL-MCNC: 6.2 G/DL (ref 6–8.5)
PROT SERPL-MCNC: 6.8 G/DL (ref 6–8.5)
PROT UR QL STRIP: ABNORMAL
PROTHROMBIN TIME: 12.7 SECONDS (ref 11.7–14.2)
PROTHROMBIN TIME: 15.8 SECONDS (ref 11.7–14.2)
QT INTERVAL: 424 MS
RBC # BLD AUTO: 2.4 10*6/MM3 (ref 3.77–5.28)
RBC # BLD AUTO: 3.07 10*6/MM3 (ref 3.77–5.28)
RBC # BLD AUTO: 3.66 10*6/MM3 (ref 3.77–5.28)
RBC # UR STRIP: ABNORMAL /HPF
REF LAB TEST METHOD: ABNORMAL
SAO2 % BLDCOA: 95.9 % (ref 92–99)
SARS-COV-2 RNA RESP QL NAA+PROBE: NOT DETECTED
SODIUM SERPL-SCNC: 132 MMOL/L (ref 136–145)
SODIUM SERPL-SCNC: 135 MMOL/L (ref 136–145)
SODIUM SERPL-SCNC: 137 MMOL/L (ref 136–145)
SODIUM SERPL-SCNC: 139 MMOL/L (ref 136–145)
SP GR UR STRIP: 1.01 (ref 1–1.03)
SQUAMOUS #/AREA URNS HPF: ABNORMAL /HPF
T4 FREE SERPL-MCNC: 1.48 NG/DL (ref 0.93–1.7)
TOTAL RATE: 24 BREATHS/MINUTE
TRIGL SERPL-MCNC: 143 MG/DL (ref 0–150)
TROPONIN T DELTA: -57 NG/L
TROPONIN T SERPL HS-MCNC: 244 NG/L
TSH SERPL DL<=0.05 MIU/L-ACNC: 23.7 UIU/ML (ref 0.27–4.2)
TSH SERPL DL<=0.05 MIU/L-ACNC: 32.8 UIU/ML (ref 0.27–4.2)
UROBILINOGEN UR QL STRIP: ABNORMAL
VARIANT LYMPHS NFR BLD MANUAL: 3.1 % (ref 19.6–45.3)
VLDLC SERPL-MCNC: 25 MG/DL (ref 5–40)
WBC # UR STRIP: ABNORMAL /HPF
WBC CLUMPS # UR AUTO: ABNORMAL /HPF
WBC MORPH BLD: NORMAL
WBC NRBC COR # BLD: 17.41 10*3/MM3 (ref 3.4–10.8)
WBC NRBC COR # BLD: 17.99 10*3/MM3 (ref 3.4–10.8)
WBC NRBC COR # BLD: 7.71 10*3/MM3 (ref 3.4–10.8)

## 2023-01-01 PROCEDURE — 84132 ASSAY OF SERUM POTASSIUM: CPT | Performed by: INTERNAL MEDICINE

## 2023-01-01 PROCEDURE — 93005 ELECTROCARDIOGRAM TRACING: CPT | Performed by: INTERNAL MEDICINE

## 2023-01-01 PROCEDURE — C1889 IMPLANT/INSERT DEVICE, NOC: HCPCS | Performed by: RADIOLOGY

## 2023-01-01 PROCEDURE — 25010000002 CEFTRIAXONE PER 250 MG: Performed by: HOSPITALIST

## 2023-01-01 PROCEDURE — 99232 SBSQ HOSP IP/OBS MODERATE 35: CPT | Performed by: INTERNAL MEDICINE

## 2023-01-01 PROCEDURE — 25010000002 PROPOFOL 10 MG/ML EMULSION: Performed by: ANESTHESIOLOGY

## 2023-01-01 PROCEDURE — 25010000002 HYDRALAZINE PER 20 MG: Performed by: HOSPITALIST

## 2023-01-01 PROCEDURE — C1894 INTRO/SHEATH, NON-LASER: HCPCS | Performed by: RADIOLOGY

## 2023-01-01 PROCEDURE — 84439 ASSAY OF FREE THYROXINE: CPT | Performed by: EMERGENCY MEDICINE

## 2023-01-01 PROCEDURE — 80307 DRUG TEST PRSMV CHEM ANLYZR: CPT | Performed by: EMERGENCY MEDICINE

## 2023-01-01 PROCEDURE — B41F1ZZ FLUOROSCOPY OF RIGHT LOWER EXTREMITY ARTERIES USING LOW OSMOLAR CONTRAST: ICD-10-PCS | Performed by: RADIOLOGY

## 2023-01-01 PROCEDURE — 04LA3DZ OCCLUSION OF LEFT RENAL ARTERY WITH INTRALUMINAL DEVICE, PERCUTANEOUS APPROACH: ICD-10-PCS | Performed by: RADIOLOGY

## 2023-01-01 PROCEDURE — 94799 UNLISTED PULMONARY SVC/PX: CPT

## 2023-01-01 PROCEDURE — 85014 HEMATOCRIT: CPT

## 2023-01-01 PROCEDURE — 25010000002 NEOSTIGMINE 5 MG/10ML SOLUTION: Performed by: ANESTHESIOLOGY

## 2023-01-01 PROCEDURE — 36600 WITHDRAWAL OF ARTERIAL BLOOD: CPT

## 2023-01-01 PROCEDURE — 85018 HEMOGLOBIN: CPT | Performed by: RADIOLOGY

## 2023-01-01 PROCEDURE — 25010000002 FOSPHENYTOIN 500 MG PE/10ML SOLUTION: Performed by: PSYCHIATRY & NEUROLOGY

## 2023-01-01 PROCEDURE — 82962 GLUCOSE BLOOD TEST: CPT

## 2023-01-01 PROCEDURE — 25010000002 CEFAZOLIN IN DEXTROSE 2-4 GM/100ML-% SOLUTION: Performed by: RADIOLOGY

## 2023-01-01 PROCEDURE — 87340 HEPATITIS B SURFACE AG IA: CPT | Performed by: INTERNAL MEDICINE

## 2023-01-01 PROCEDURE — 25010000002 ONDANSETRON PER 1 MG: Performed by: ANESTHESIOLOGY

## 2023-01-01 PROCEDURE — 95819 EEG AWAKE AND ASLEEP: CPT

## 2023-01-01 PROCEDURE — 5A1D70Z PERFORMANCE OF URINARY FILTRATION, INTERMITTENT, LESS THAN 6 HOURS PER DAY: ICD-10-PCS | Performed by: INTERNAL MEDICINE

## 2023-01-01 PROCEDURE — 87086 URINE CULTURE/COLONY COUNT: CPT | Performed by: EMERGENCY MEDICINE

## 2023-01-01 PROCEDURE — 93010 ELECTROCARDIOGRAM REPORT: CPT | Performed by: INTERNAL MEDICINE

## 2023-01-01 PROCEDURE — 85610 PROTHROMBIN TIME: CPT | Performed by: EMERGENCY MEDICINE

## 2023-01-01 PROCEDURE — 70450 CT HEAD/BRAIN W/O DYE: CPT

## 2023-01-01 PROCEDURE — 85007 BL SMEAR W/DIFF WBC COUNT: CPT | Performed by: UROLOGY

## 2023-01-01 PROCEDURE — C1887 CATHETER, GUIDING: HCPCS | Performed by: RADIOLOGY

## 2023-01-01 PROCEDURE — 25010000002 HYDRALAZINE PER 20 MG

## 2023-01-01 PROCEDURE — 82948 REAGENT STRIP/BLOOD GLUCOSE: CPT

## 2023-01-01 PROCEDURE — P9016 RBC LEUKOCYTES REDUCED: HCPCS

## 2023-01-01 PROCEDURE — 25010000002 LEVETRIRACETAM PER 10 MG: Performed by: PSYCHIATRY & NEUROLOGY

## 2023-01-01 PROCEDURE — C1769 GUIDE WIRE: HCPCS | Performed by: RADIOLOGY

## 2023-01-01 PROCEDURE — 85025 COMPLETE CBC W/AUTO DIFF WBC: CPT | Performed by: UROLOGY

## 2023-01-01 PROCEDURE — C1760 CLOSURE DEV, VASC: HCPCS | Performed by: RADIOLOGY

## 2023-01-01 PROCEDURE — 85025 COMPLETE CBC W/AUTO DIFF WBC: CPT | Performed by: EMERGENCY MEDICINE

## 2023-01-01 PROCEDURE — 63710000001 INSULIN REGULAR HUMAN PER 5 UNITS: Performed by: EMERGENCY MEDICINE

## 2023-01-01 PROCEDURE — 25010000002 FENTANYL CITRATE (PF) 50 MCG/ML SOLUTION: Performed by: ANESTHESIOLOGY

## 2023-01-01 PROCEDURE — 36415 COLL VENOUS BLD VENIPUNCTURE: CPT | Performed by: HOSPITALIST

## 2023-01-01 PROCEDURE — 0 IOPAMIDOL PER 1 ML: Performed by: HOSPITALIST

## 2023-01-01 PROCEDURE — 25010000002 MORPHINE PER 10 MG: Performed by: HOSPITALIST

## 2023-01-01 PROCEDURE — P9612 CATHETERIZE FOR URINE SPEC: HCPCS

## 2023-01-01 PROCEDURE — 25010000002 CALCIUM GLUCONATE-NACL 1-0.675 GM/50ML-% SOLUTION: Performed by: EMERGENCY MEDICINE

## 2023-01-01 PROCEDURE — 84443 ASSAY THYROID STIM HORMONE: CPT | Performed by: EMERGENCY MEDICINE

## 2023-01-01 PROCEDURE — 80053 COMPREHEN METABOLIC PANEL: CPT | Performed by: UROLOGY

## 2023-01-01 PROCEDURE — 84484 ASSAY OF TROPONIN QUANT: CPT | Performed by: EMERGENCY MEDICINE

## 2023-01-01 PROCEDURE — 36430 TRANSFUSION BLD/BLD COMPNT: CPT

## 2023-01-01 PROCEDURE — 94640 AIRWAY INHALATION TREATMENT: CPT

## 2023-01-01 PROCEDURE — B4171ZZ FLUOROSCOPY OF LEFT RENAL ARTERY USING LOW OSMOLAR CONTRAST: ICD-10-PCS | Performed by: RADIOLOGY

## 2023-01-01 PROCEDURE — 25010000002 LORAZEPAM PER 2 MG: Performed by: PSYCHIATRY & NEUROLOGY

## 2023-01-01 PROCEDURE — 93005 ELECTROCARDIOGRAM TRACING: CPT | Performed by: EMERGENCY MEDICINE

## 2023-01-01 PROCEDURE — U0003 INFECTIOUS AGENT DETECTION BY NUCLEIC ACID (DNA OR RNA); SEVERE ACUTE RESPIRATORY SYNDROME CORONAVIRUS 2 (SARS-COV-2) (CORONAVIRUS DISEASE [COVID-19]), AMPLIFIED PROBE TECHNIQUE, MAKING USE OF HIGH THROUGHPUT TECHNOLOGIES AS DESCRIBED BY CMS-2020-01-R: HCPCS | Performed by: EMERGENCY MEDICINE

## 2023-01-01 PROCEDURE — 84100 ASSAY OF PHOSPHORUS: CPT | Performed by: INTERNAL MEDICINE

## 2023-01-01 PROCEDURE — 80061 LIPID PANEL: CPT | Performed by: HOSPITALIST

## 2023-01-01 PROCEDURE — 85025 COMPLETE CBC W/AUTO DIFF WBC: CPT | Performed by: HOSPITALIST

## 2023-01-01 PROCEDURE — 85610 PROTHROMBIN TIME: CPT | Performed by: INTERNAL MEDICINE

## 2023-01-01 PROCEDURE — 87040 BLOOD CULTURE FOR BACTERIA: CPT | Performed by: HOSPITALIST

## 2023-01-01 PROCEDURE — 25010000002 HEPARIN (PORCINE) PER 1000 UNITS: Performed by: RADIOLOGY

## 2023-01-01 PROCEDURE — 99285 EMERGENCY DEPT VISIT HI MDM: CPT

## 2023-01-01 PROCEDURE — 84145 PROCALCITONIN (PCT): CPT | Performed by: INTERNAL MEDICINE

## 2023-01-01 PROCEDURE — 80053 COMPREHEN METABOLIC PANEL: CPT | Performed by: HOSPITALIST

## 2023-01-01 PROCEDURE — 83880 ASSAY OF NATRIURETIC PEPTIDE: CPT | Performed by: HOSPITALIST

## 2023-01-01 PROCEDURE — 86900 BLOOD TYPING SEROLOGIC ABO: CPT

## 2023-01-01 PROCEDURE — 82550 ASSAY OF CK (CPK): CPT | Performed by: EMERGENCY MEDICINE

## 2023-01-01 PROCEDURE — 83036 HEMOGLOBIN GLYCOSYLATED A1C: CPT | Performed by: HOSPITALIST

## 2023-01-01 PROCEDURE — 85730 THROMBOPLASTIN TIME PARTIAL: CPT | Performed by: INTERNAL MEDICINE

## 2023-01-01 PROCEDURE — 83605 ASSAY OF LACTIC ACID: CPT | Performed by: HOSPITALIST

## 2023-01-01 PROCEDURE — 84443 ASSAY THYROID STIM HORMONE: CPT | Performed by: HOSPITALIST

## 2023-01-01 PROCEDURE — 80053 COMPREHEN METABOLIC PANEL: CPT | Performed by: EMERGENCY MEDICINE

## 2023-01-01 PROCEDURE — 0 LIDOCAINE 1 % SOLUTION: Performed by: RADIOLOGY

## 2023-01-01 PROCEDURE — 71045 X-RAY EXAM CHEST 1 VIEW: CPT

## 2023-01-01 PROCEDURE — 82077 ASSAY SPEC XCP UR&BREATH IA: CPT | Performed by: EMERGENCY MEDICINE

## 2023-01-01 PROCEDURE — 82803 BLOOD GASES ANY COMBINATION: CPT

## 2023-01-01 PROCEDURE — 81001 URINALYSIS AUTO W/SCOPE: CPT | Performed by: EMERGENCY MEDICINE

## 2023-01-01 PROCEDURE — 85018 HEMOGLOBIN: CPT

## 2023-01-01 PROCEDURE — 25010000002 HYDROMORPHONE PER 4 MG: Performed by: INTERNAL MEDICINE

## 2023-01-01 PROCEDURE — 25010000002 LEVETRIRACETAM PER 10 MG: Performed by: EMERGENCY MEDICINE

## 2023-01-01 PROCEDURE — 83735 ASSAY OF MAGNESIUM: CPT | Performed by: EMERGENCY MEDICINE

## 2023-01-01 PROCEDURE — 85014 HEMATOCRIT: CPT | Performed by: RADIOLOGY

## 2023-01-01 DEVICE — AZUR
Type: IMPLANTABLE DEVICE | Site: ARTERY FEMORAL | Status: FUNCTIONAL
Brand: AZUR DETACHABLE HELICAL HYDROCOIL

## 2023-01-01 DEVICE — HYDROCOIL AZUR CX PERIPH .018 4MM 13CM: Type: IMPLANTABLE DEVICE | Site: ARTERY FEMORAL | Status: FUNCTIONAL

## 2023-01-01 DEVICE — HYDROCOIL AZUR CX PERIPH .018 3MM 8CM: Type: IMPLANTABLE DEVICE | Site: ARTERY FEMORAL | Status: FUNCTIONAL

## 2023-01-01 RX ORDER — ACETAMINOPHEN 650 MG/1
650 SUPPOSITORY RECTAL EVERY 4 HOURS PRN
Status: DISCONTINUED | OUTPATIENT
Start: 2023-01-01 | End: 2023-01-01 | Stop reason: HOSPADM

## 2023-01-01 RX ORDER — GLYCOPYRROLATE 0.2 MG/ML
0.4 INJECTION INTRAMUSCULAR; INTRAVENOUS
Status: DISCONTINUED | OUTPATIENT
Start: 2023-01-01 | End: 2023-01-01 | Stop reason: HOSPADM

## 2023-01-01 RX ORDER — ACETAMINOPHEN 325 MG/1
650 TABLET ORAL EVERY 4 HOURS PRN
Status: DISCONTINUED | OUTPATIENT
Start: 2023-01-01 | End: 2023-01-01 | Stop reason: HOSPADM

## 2023-01-01 RX ORDER — HYDROMORPHONE HYDROCHLORIDE 1 MG/ML
0.5 INJECTION, SOLUTION INTRAMUSCULAR; INTRAVENOUS; SUBCUTANEOUS
Status: DISCONTINUED | OUTPATIENT
Start: 2023-01-01 | End: 2023-01-01 | Stop reason: HOSPADM

## 2023-01-01 RX ORDER — LEVETIRACETAM 500 MG/5ML
1000 INJECTION, SOLUTION, CONCENTRATE INTRAVENOUS EVERY 6 HOURS
Status: DISCONTINUED | OUTPATIENT
Start: 2023-01-01 | End: 2023-01-01

## 2023-01-01 RX ORDER — HYDRALAZINE HYDROCHLORIDE 50 MG/1
100 TABLET, FILM COATED ORAL EVERY 8 HOURS SCHEDULED
Status: DISCONTINUED | OUTPATIENT
Start: 2023-01-01 | End: 2023-01-01

## 2023-01-01 RX ORDER — AMLODIPINE BESYLATE 10 MG/1
10 TABLET ORAL
Status: DISCONTINUED | OUTPATIENT
Start: 2023-01-01 | End: 2023-01-01

## 2023-01-01 RX ORDER — DEXTROSE AND SODIUM CHLORIDE 5; .45 G/100ML; G/100ML
50 INJECTION, SOLUTION INTRAVENOUS CONTINUOUS
Status: DISCONTINUED | OUTPATIENT
Start: 2023-01-01 | End: 2023-01-01

## 2023-01-01 RX ORDER — DEXTROSE MONOHYDRATE 25 G/50ML
25 INJECTION, SOLUTION INTRAVENOUS
Status: DISCONTINUED | OUTPATIENT
Start: 2023-01-01 | End: 2023-01-01 | Stop reason: HOSPADM

## 2023-01-01 RX ORDER — FENTANYL CITRATE 50 UG/ML
50 INJECTION, SOLUTION INTRAMUSCULAR; INTRAVENOUS
Status: DISCONTINUED | OUTPATIENT
Start: 2023-01-01 | End: 2023-01-01 | Stop reason: HOSPADM

## 2023-01-01 RX ORDER — HYDRALAZINE HYDROCHLORIDE 20 MG/ML
5 INJECTION INTRAMUSCULAR; INTRAVENOUS
Status: DISCONTINUED | OUTPATIENT
Start: 2023-01-01 | End: 2023-01-01 | Stop reason: HOSPADM

## 2023-01-01 RX ORDER — LABETALOL HYDROCHLORIDE 5 MG/ML
20 INJECTION, SOLUTION INTRAVENOUS ONCE
Status: COMPLETED | OUTPATIENT
Start: 2023-01-01 | End: 2023-01-01

## 2023-01-01 RX ORDER — NALOXONE HCL 0.4 MG/ML
0.2 VIAL (ML) INJECTION AS NEEDED
Status: DISCONTINUED | OUTPATIENT
Start: 2023-01-01 | End: 2023-01-01 | Stop reason: HOSPADM

## 2023-01-01 RX ORDER — LEVETIRACETAM 500 MG/5ML
1000 INJECTION, SOLUTION, CONCENTRATE INTRAVENOUS ONCE
Status: DISCONTINUED | OUTPATIENT
Start: 2023-01-01 | End: 2023-01-01

## 2023-01-01 RX ORDER — SODIUM CHLORIDE 0.9 % (FLUSH) 0.9 %
10 SYRINGE (ML) INJECTION AS NEEDED
Status: DISCONTINUED | OUTPATIENT
Start: 2023-01-01 | End: 2023-01-01

## 2023-01-01 RX ORDER — NEOSTIGMINE METHYLSULFATE 0.5 MG/ML
INJECTION, SOLUTION INTRAVENOUS AS NEEDED
Status: DISCONTINUED | OUTPATIENT
Start: 2023-01-01 | End: 2023-01-01 | Stop reason: SURG

## 2023-01-01 RX ORDER — AMOXICILLIN 250 MG
2 CAPSULE ORAL NIGHTLY PRN
Status: DISCONTINUED | OUTPATIENT
Start: 2023-01-01 | End: 2023-01-01 | Stop reason: HOSPADM

## 2023-01-01 RX ORDER — LABETALOL HYDROCHLORIDE 5 MG/ML
20 INJECTION, SOLUTION INTRAVENOUS ONCE
Status: DISCONTINUED | OUTPATIENT
Start: 2023-01-01 | End: 2023-01-01

## 2023-01-01 RX ORDER — HYDROCODONE BITARTRATE AND ACETAMINOPHEN 7.5; 325 MG/1; MG/1
1 TABLET ORAL ONCE AS NEEDED
Status: DISCONTINUED | OUTPATIENT
Start: 2023-01-01 | End: 2023-01-01 | Stop reason: HOSPADM

## 2023-01-01 RX ORDER — LEVETIRACETAM 500 MG/1
250 TABLET ORAL 2 TIMES DAILY
Status: DISCONTINUED | OUTPATIENT
Start: 2023-01-01 | End: 2023-01-01

## 2023-01-01 RX ORDER — CARVEDILOL 3.12 MG/1
3.12 TABLET ORAL 2 TIMES DAILY WITH MEALS
Status: DISCONTINUED | OUTPATIENT
Start: 2023-01-01 | End: 2023-01-01

## 2023-01-01 RX ORDER — CLONIDINE 0.1 MG/24H
1 PATCH, EXTENDED RELEASE TRANSDERMAL WEEKLY
Status: DISCONTINUED | OUTPATIENT
Start: 2023-01-01 | End: 2023-01-01

## 2023-01-01 RX ORDER — DEXTROSE MONOHYDRATE 100 MG/ML
50 INJECTION, SOLUTION INTRAVENOUS CONTINUOUS
Status: DISCONTINUED | OUTPATIENT
Start: 2023-01-01 | End: 2023-01-02

## 2023-01-01 RX ORDER — GLYCOPYRROLATE 0.2 MG/ML
0.2 INJECTION INTRAMUSCULAR; INTRAVENOUS
Status: DISCONTINUED | OUTPATIENT
Start: 2023-01-01 | End: 2023-01-01 | Stop reason: HOSPADM

## 2023-01-01 RX ORDER — LORAZEPAM 2 MG/ML
0.5 CONCENTRATE ORAL
Status: DISCONTINUED | OUTPATIENT
Start: 2023-01-01 | End: 2023-01-01 | Stop reason: HOSPADM

## 2023-01-01 RX ORDER — LEVETIRACETAM 500 MG/5ML
1500 INJECTION, SOLUTION, CONCENTRATE INTRAVENOUS ONCE
Status: COMPLETED | OUTPATIENT
Start: 2023-01-01 | End: 2023-01-01

## 2023-01-01 RX ORDER — FLUMAZENIL 0.1 MG/ML
0.2 INJECTION INTRAVENOUS AS NEEDED
Status: DISCONTINUED | OUTPATIENT
Start: 2023-01-01 | End: 2023-01-01 | Stop reason: HOSPADM

## 2023-01-01 RX ORDER — ROPINIROLE 2 MG/1
2 TABLET, FILM COATED ORAL NIGHTLY
Status: DISCONTINUED | OUTPATIENT
Start: 2023-01-01 | End: 2023-01-01

## 2023-01-01 RX ORDER — IBUPROFEN 600 MG/1
1 TABLET ORAL
Status: DISCONTINUED | OUTPATIENT
Start: 2023-01-01 | End: 2023-01-01 | Stop reason: HOSPADM

## 2023-01-01 RX ORDER — LEVETIRACETAM 500 MG/5ML
500 INJECTION, SOLUTION, CONCENTRATE INTRAVENOUS EVERY 12 HOURS SCHEDULED
Status: DISCONTINUED | OUTPATIENT
Start: 2023-01-01 | End: 2023-01-01

## 2023-01-01 RX ORDER — LORAZEPAM 2 MG/ML
2 INJECTION INTRAMUSCULAR
Status: DISCONTINUED | OUTPATIENT
Start: 2023-01-01 | End: 2023-01-01 | Stop reason: HOSPADM

## 2023-01-01 RX ORDER — PROPOFOL 10 MG/ML
VIAL (ML) INTRAVENOUS AS NEEDED
Status: DISCONTINUED | OUTPATIENT
Start: 2023-01-01 | End: 2023-01-01 | Stop reason: SURG

## 2023-01-01 RX ORDER — DIPHENHYDRAMINE HYDROCHLORIDE 50 MG/ML
12.5 INJECTION INTRAMUSCULAR; INTRAVENOUS
Status: DISCONTINUED | OUTPATIENT
Start: 2023-01-01 | End: 2023-01-01 | Stop reason: HOSPADM

## 2023-01-01 RX ORDER — MORPHINE SULFATE 20 MG/ML
5 SOLUTION ORAL
Status: DISCONTINUED | OUTPATIENT
Start: 2023-01-01 | End: 2023-01-01 | Stop reason: HOSPADM

## 2023-01-01 RX ORDER — OXYCODONE AND ACETAMINOPHEN 7.5; 325 MG/1; MG/1
1 TABLET ORAL EVERY 4 HOURS PRN
Status: DISCONTINUED | OUTPATIENT
Start: 2023-01-01 | End: 2023-01-01 | Stop reason: HOSPADM

## 2023-01-01 RX ORDER — DEXTROSE MONOHYDRATE 25 G/50ML
50 INJECTION, SOLUTION INTRAVENOUS ONCE
Status: DISCONTINUED | OUTPATIENT
Start: 2023-01-01 | End: 2023-01-01

## 2023-01-01 RX ORDER — SODIUM CHLORIDE 9 MG/ML
40 INJECTION, SOLUTION INTRAVENOUS AS NEEDED
Status: DISCONTINUED | OUTPATIENT
Start: 2023-01-01 | End: 2023-01-01 | Stop reason: HOSPADM

## 2023-01-01 RX ORDER — AMLODIPINE BESYLATE 10 MG/1
10 TABLET ORAL
Status: DISCONTINUED | OUTPATIENT
Start: 2023-01-01 | End: 2023-01-29 | Stop reason: HOSPADM

## 2023-01-01 RX ORDER — LEVETIRACETAM 500 MG/5ML
1000 INJECTION, SOLUTION, CONCENTRATE INTRAVENOUS ONCE
Status: COMPLETED | OUTPATIENT
Start: 2023-01-01 | End: 2023-01-01

## 2023-01-01 RX ORDER — CEFAZOLIN SODIUM 2 G/100ML
2 INJECTION, SOLUTION INTRAVENOUS ONCE
Status: COMPLETED | OUTPATIENT
Start: 2023-01-01 | End: 2023-01-01

## 2023-01-01 RX ORDER — ONDANSETRON 2 MG/ML
INJECTION INTRAMUSCULAR; INTRAVENOUS AS NEEDED
Status: DISCONTINUED | OUTPATIENT
Start: 2023-01-01 | End: 2023-01-01 | Stop reason: SURG

## 2023-01-01 RX ORDER — LORAZEPAM 2 MG/ML
2 INJECTION INTRAMUSCULAR ONCE
Status: DISCONTINUED | OUTPATIENT
Start: 2023-01-01 | End: 2023-01-01

## 2023-01-01 RX ORDER — HYDRALAZINE HYDROCHLORIDE 20 MG/ML
10 INJECTION INTRAMUSCULAR; INTRAVENOUS EVERY 4 HOURS PRN
Status: DISCONTINUED | OUTPATIENT
Start: 2023-01-01 | End: 2023-01-01 | Stop reason: HOSPADM

## 2023-01-01 RX ORDER — ASPIRIN 81 MG/1
81 TABLET ORAL DAILY
Status: DISCONTINUED | OUTPATIENT
Start: 2023-01-01 | End: 2023-01-01

## 2023-01-01 RX ORDER — HYDRALAZINE HYDROCHLORIDE 20 MG/ML
5 INJECTION INTRAMUSCULAR; INTRAVENOUS
Status: DISCONTINUED | OUTPATIENT
Start: 2023-01-01 | End: 2023-01-29 | Stop reason: HOSPADM

## 2023-01-01 RX ORDER — EPHEDRINE SULFATE 50 MG/ML
5 INJECTION, SOLUTION INTRAVENOUS ONCE AS NEEDED
Status: DISCONTINUED | OUTPATIENT
Start: 2023-01-01 | End: 2023-01-01 | Stop reason: HOSPADM

## 2023-01-01 RX ORDER — NICOTINE POLACRILEX 4 MG
15 LOZENGE BUCCAL
Status: DISCONTINUED | OUTPATIENT
Start: 2023-01-01 | End: 2023-01-01 | Stop reason: HOSPADM

## 2023-01-01 RX ORDER — SODIUM CHLORIDE 0.9 % (FLUSH) 0.9 %
10 SYRINGE (ML) INJECTION EVERY 12 HOURS SCHEDULED
Status: DISCONTINUED | OUTPATIENT
Start: 2023-01-01 | End: 2023-01-01 | Stop reason: HOSPADM

## 2023-01-01 RX ORDER — ACETAMINOPHEN 325 MG/1
650 TABLET ORAL EVERY 6 HOURS PRN
Status: DISCONTINUED | OUTPATIENT
Start: 2023-01-01 | End: 2023-01-01 | Stop reason: HOSPADM

## 2023-01-01 RX ORDER — SODIUM CHLORIDE 0.9 % (FLUSH) 0.9 %
10 SYRINGE (ML) INJECTION AS NEEDED
Status: DISCONTINUED | OUTPATIENT
Start: 2023-01-01 | End: 2023-01-01 | Stop reason: HOSPADM

## 2023-01-01 RX ORDER — ROCURONIUM BROMIDE 10 MG/ML
INJECTION, SOLUTION INTRAVENOUS AS NEEDED
Status: DISCONTINUED | OUTPATIENT
Start: 2023-01-01 | End: 2023-01-01 | Stop reason: SURG

## 2023-01-01 RX ORDER — LABETALOL HYDROCHLORIDE 5 MG/ML
5 INJECTION, SOLUTION INTRAVENOUS
Status: DISCONTINUED | OUTPATIENT
Start: 2023-01-01 | End: 2023-01-01 | Stop reason: HOSPADM

## 2023-01-01 RX ORDER — SODIUM CHLORIDE, SODIUM LACTATE, POTASSIUM CHLORIDE, CALCIUM CHLORIDE 600; 310; 30; 20 MG/100ML; MG/100ML; MG/100ML; MG/100ML
9 INJECTION, SOLUTION INTRAVENOUS CONTINUOUS PRN
Status: DISCONTINUED | OUTPATIENT
Start: 2023-01-01 | End: 2023-01-01 | Stop reason: HOSPADM

## 2023-01-01 RX ORDER — DIPHENHYDRAMINE HCL 25 MG
25 CAPSULE ORAL
Status: DISCONTINUED | OUTPATIENT
Start: 2023-01-01 | End: 2023-01-01 | Stop reason: HOSPADM

## 2023-01-01 RX ORDER — PANTOPRAZOLE SODIUM 40 MG/1
40 TABLET, DELAYED RELEASE ORAL DAILY
Status: DISCONTINUED | OUTPATIENT
Start: 2023-01-01 | End: 2023-01-01

## 2023-01-01 RX ORDER — HYDRALAZINE HYDROCHLORIDE 50 MG/1
50 TABLET, FILM COATED ORAL 2 TIMES DAILY
Status: DISCONTINUED | OUTPATIENT
Start: 2023-01-01 | End: 2023-01-01

## 2023-01-01 RX ORDER — DEXTROSE MONOHYDRATE 25 G/50ML
50 INJECTION, SOLUTION INTRAVENOUS ONCE
Status: COMPLETED | OUTPATIENT
Start: 2023-01-01 | End: 2023-01-01

## 2023-01-01 RX ORDER — NYSTATIN 100000 [USP'U]/G
POWDER TOPICAL EVERY 12 HOURS SCHEDULED
Status: DISCONTINUED | OUTPATIENT
Start: 2023-01-01 | End: 2023-01-01

## 2023-01-01 RX ORDER — MORPHINE SULFATE 2 MG/ML
2 INJECTION, SOLUTION INTRAMUSCULAR; INTRAVENOUS
Status: DISCONTINUED | OUTPATIENT
Start: 2023-01-01 | End: 2023-01-01 | Stop reason: HOSPADM

## 2023-01-01 RX ORDER — CALCIUM GLUCONATE 20 MG/ML
1 INJECTION, SOLUTION INTRAVENOUS ONCE
Status: DISCONTINUED | OUTPATIENT
Start: 2023-01-01 | End: 2023-01-01

## 2023-01-01 RX ORDER — LEVETIRACETAM 500 MG/1
500 TABLET ORAL 2 TIMES DAILY
Status: DISCONTINUED | OUTPATIENT
Start: 2023-01-01 | End: 2023-01-01

## 2023-01-01 RX ORDER — GLYCOPYRROLATE 0.2 MG/ML
INJECTION INTRAMUSCULAR; INTRAVENOUS AS NEEDED
Status: DISCONTINUED | OUTPATIENT
Start: 2023-01-01 | End: 2023-01-01 | Stop reason: SURG

## 2023-01-01 RX ORDER — UREA 10 %
3 LOTION (ML) TOPICAL NIGHTLY PRN
Status: DISCONTINUED | OUTPATIENT
Start: 2023-01-01 | End: 2023-01-01 | Stop reason: HOSPADM

## 2023-01-01 RX ORDER — LIDOCAINE HYDROCHLORIDE 10 MG/ML
INJECTION, SOLUTION INFILTRATION; PERINEURAL AS NEEDED
Status: DISCONTINUED | OUTPATIENT
Start: 2023-01-01 | End: 2023-01-01 | Stop reason: HOSPADM

## 2023-01-01 RX ORDER — CARVEDILOL 25 MG/1
25 TABLET ORAL EVERY 12 HOURS SCHEDULED
Status: DISCONTINUED | OUTPATIENT
Start: 2023-01-01 | End: 2023-01-15

## 2023-01-01 RX ORDER — ACETAMINOPHEN 160 MG/5ML
650 SOLUTION ORAL EVERY 4 HOURS PRN
Status: DISCONTINUED | OUTPATIENT
Start: 2023-01-01 | End: 2023-01-01 | Stop reason: HOSPADM

## 2023-01-01 RX ORDER — LIDOCAINE HYDROCHLORIDE 20 MG/ML
INJECTION, SOLUTION INFILTRATION; PERINEURAL AS NEEDED
Status: DISCONTINUED | OUTPATIENT
Start: 2023-01-01 | End: 2023-01-01 | Stop reason: SURG

## 2023-01-01 RX ORDER — AMLODIPINE BESYLATE 5 MG/1
5 TABLET ORAL 2 TIMES DAILY
COMMUNITY

## 2023-01-01 RX ORDER — LEVOTHYROXINE SODIUM 0.15 MG/1
300 TABLET ORAL DAILY
Status: DISCONTINUED | OUTPATIENT
Start: 2023-01-01 | End: 2023-01-01

## 2023-01-01 RX ORDER — CALCIUM GLUCONATE 20 MG/ML
1 INJECTION, SOLUTION INTRAVENOUS ONCE
Status: COMPLETED | OUTPATIENT
Start: 2023-01-01 | End: 2023-01-01

## 2023-01-01 RX ORDER — DESVENLAFAXINE 25 MG/1
25 TABLET, EXTENDED RELEASE ORAL DAILY
Status: DISCONTINUED | OUTPATIENT
Start: 2023-01-01 | End: 2023-01-01

## 2023-01-01 RX ORDER — LORAZEPAM 2 MG/ML
0.5 INJECTION INTRAMUSCULAR
Status: DISCONTINUED | OUTPATIENT
Start: 2023-01-01 | End: 2023-01-01 | Stop reason: HOSPADM

## 2023-01-01 RX ORDER — SCOLOPAMINE TRANSDERMAL SYSTEM 1 MG/1
1 PATCH, EXTENDED RELEASE TRANSDERMAL
Status: DISCONTINUED | OUTPATIENT
Start: 2023-01-01 | End: 2023-01-01 | Stop reason: HOSPADM

## 2023-01-01 RX ORDER — LEVOTHYROXINE SODIUM 175 UG/1
350 TABLET ORAL
Status: DISCONTINUED | OUTPATIENT
Start: 2023-01-01 | End: 2023-01-01

## 2023-01-01 RX ORDER — FOLIC ACID 1 MG/1
1 TABLET ORAL DAILY
Status: DISCONTINUED | OUTPATIENT
Start: 2023-01-01 | End: 2023-01-01

## 2023-01-01 RX ORDER — ONDANSETRON 2 MG/ML
4 INJECTION INTRAMUSCULAR; INTRAVENOUS ONCE AS NEEDED
Status: DISCONTINUED | OUTPATIENT
Start: 2023-01-01 | End: 2023-01-01 | Stop reason: HOSPADM

## 2023-01-01 RX ORDER — LORAZEPAM 2 MG/ML
1 INJECTION INTRAMUSCULAR ONCE
Status: DISCONTINUED | OUTPATIENT
Start: 2023-01-01 | End: 2023-01-01

## 2023-01-01 RX ADMIN — CEFTRIAXONE SODIUM 1 G: 1 INJECTION, POWDER, FOR SOLUTION INTRAMUSCULAR; INTRAVENOUS at 23:37

## 2023-01-01 RX ADMIN — MORPHINE SULFATE 2 MG: 2 INJECTION, SOLUTION INTRAMUSCULAR; INTRAVENOUS at 16:45

## 2023-01-01 RX ADMIN — LEVETIRACETAM 1000 MG: 100 INJECTION, SOLUTION INTRAVENOUS at 23:18

## 2023-01-01 RX ADMIN — LEVETIRACETAM 1000 MG: 100 INJECTION, SOLUTION INTRAVENOUS at 06:40

## 2023-01-01 RX ADMIN — CARVEDILOL 25 MG: 25 TABLET, FILM COATED ORAL at 17:03

## 2023-01-01 RX ADMIN — DEXTROSE MONOHYDRATE 50 ML: 25 INJECTION, SOLUTION INTRAVENOUS at 13:41

## 2023-01-01 RX ADMIN — ALBUTEROL SULFATE 10 MG: 2.5 SOLUTION RESPIRATORY (INHALATION) at 13:54

## 2023-01-01 RX ADMIN — ROCURONIUM BROMIDE 4 MG: 50 INJECTION INTRAVENOUS at 14:06

## 2023-01-01 RX ADMIN — MORPHINE SULFATE 2 MG: 2 INJECTION, SOLUTION INTRAMUSCULAR; INTRAVENOUS at 09:17

## 2023-01-01 RX ADMIN — AMLODIPINE BESYLATE 10 MG: 10 TABLET ORAL at 17:03

## 2023-01-01 RX ADMIN — LIDOCAINE HYDROCHLORIDE 100 MG: 20 INJECTION, SOLUTION INFILTRATION; PERINEURAL at 14:06

## 2023-01-01 RX ADMIN — LABETALOL HYDROCHLORIDE 20 MG: 5 INJECTION, SOLUTION INTRAVENOUS at 00:34

## 2023-01-01 RX ADMIN — MORPHINE SULFATE 2 MG: 2 INJECTION, SOLUTION INTRAMUSCULAR; INTRAVENOUS at 17:16

## 2023-01-01 RX ADMIN — DEXTROSE MONOHYDRATE 25 G: 25 INJECTION, SOLUTION INTRAVENOUS at 08:20

## 2023-01-01 RX ADMIN — MORPHINE SULFATE 2 MG: 2 INJECTION, SOLUTION INTRAMUSCULAR; INTRAVENOUS at 21:13

## 2023-01-01 RX ADMIN — GLYCOPYRROLATE 0.4 MG: 0.2 INJECTION INTRAMUSCULAR; INTRAVENOUS at 21:13

## 2023-01-01 RX ADMIN — Medication 10 ML: at 09:33

## 2023-01-01 RX ADMIN — DEXTROSE MONOHYDRATE 25 G: 25 INJECTION, SOLUTION INTRAVENOUS at 08:05

## 2023-01-01 RX ADMIN — MORPHINE SULFATE 2 MG: 2 INJECTION, SOLUTION INTRAMUSCULAR; INTRAVENOUS at 06:40

## 2023-01-01 RX ADMIN — MORPHINE SULFATE 2 MG: 2 INJECTION, SOLUTION INTRAMUSCULAR; INTRAVENOUS at 22:02

## 2023-01-01 RX ADMIN — GLYCOPYRROLATE 1 MG: 1 INJECTION INTRAMUSCULAR; INTRAVENOUS at 15:22

## 2023-01-01 RX ADMIN — GLYCOPYRROLATE 0.4 MG: 0.2 INJECTION INTRAMUSCULAR; INTRAVENOUS at 14:42

## 2023-01-01 RX ADMIN — IOPAMIDOL 30 ML: 510 INJECTION, SOLUTION INTRAVASCULAR at 15:16

## 2023-01-01 RX ADMIN — HYDROMORPHONE HYDROCHLORIDE 0.5 MG: 1 INJECTION, SOLUTION INTRAMUSCULAR; INTRAVENOUS; SUBCUTANEOUS at 06:47

## 2023-01-01 RX ADMIN — DEXTROSE MONOHYDRATE 75 ML/HR: 100 INJECTION, SOLUTION INTRAVENOUS at 19:42

## 2023-01-01 RX ADMIN — GLYCOPYRROLATE 0.4 MG: 0.2 INJECTION INTRAMUSCULAR; INTRAVENOUS at 00:44

## 2023-01-01 RX ADMIN — SCOPALAMINE 1 PATCH: 1 PATCH, EXTENDED RELEASE TRANSDERMAL at 09:17

## 2023-01-01 RX ADMIN — MORPHINE SULFATE 2 MG: 2 INJECTION, SOLUTION INTRAMUSCULAR; INTRAVENOUS at 00:44

## 2023-01-01 RX ADMIN — SODIUM CHLORIDE: 9 INJECTION, SOLUTION INTRAVENOUS at 14:19

## 2023-01-01 RX ADMIN — LEVETIRACETAM 1000 MG: 100 INJECTION, SOLUTION INTRAVENOUS at 12:45

## 2023-01-01 RX ADMIN — LABETALOL HYDROCHLORIDE 20 MG: 5 INJECTION, SOLUTION INTRAVENOUS at 13:39

## 2023-01-01 RX ADMIN — SODIUM CHLORIDE 200 MG PE: 9 INJECTION, SOLUTION INTRAMUSCULAR; INTRAVENOUS; SUBCUTANEOUS at 10:18

## 2023-01-01 RX ADMIN — GLYCOPYRROLATE 0.4 MG: 0.2 INJECTION INTRAMUSCULAR; INTRAVENOUS at 13:21

## 2023-01-01 RX ADMIN — LEVETIRACETAM 1000 MG: 100 INJECTION, SOLUTION INTRAVENOUS at 06:39

## 2023-01-01 RX ADMIN — GLYCOPYRROLATE 0.4 MG: 0.2 INJECTION INTRAMUSCULAR; INTRAVENOUS at 17:34

## 2023-01-01 RX ADMIN — LORAZEPAM 2 MG: 2 INJECTION INTRAMUSCULAR; INTRAVENOUS at 19:37

## 2023-01-01 RX ADMIN — CEFAZOLIN SODIUM 2 G: 2 INJECTION, SOLUTION INTRAVENOUS at 13:46

## 2023-01-01 RX ADMIN — PANTOPRAZOLE SODIUM 40 MG: 40 TABLET, DELAYED RELEASE ORAL at 09:32

## 2023-01-01 RX ADMIN — GLYCOPYRROLATE 0.4 MG: 0.2 INJECTION INTRAMUSCULAR; INTRAVENOUS at 01:16

## 2023-01-01 RX ADMIN — LACTULOSE 10 G: 10 SOLUTION ORAL at 09:32

## 2023-01-01 RX ADMIN — SODIUM CHLORIDE 200 MG PE: 9 INJECTION, SOLUTION INTRAMUSCULAR; INTRAVENOUS; SUBCUTANEOUS at 15:56

## 2023-01-01 RX ADMIN — LEVETIRACETAM 1000 MG: 100 INJECTION, SOLUTION INTRAVENOUS at 13:28

## 2023-01-01 RX ADMIN — SODIUM BICARBONATE 50 MEQ: 84 INJECTION, SOLUTION INTRAVENOUS at 13:48

## 2023-01-01 RX ADMIN — INSULIN HUMAN 5 UNITS: 100 INJECTION, SOLUTION PARENTERAL at 13:41

## 2023-01-01 RX ADMIN — LEVETIRACETAM 1500 MG: 100 INJECTION, SOLUTION INTRAVENOUS at 19:39

## 2023-01-01 RX ADMIN — GLYCOPYRROLATE 0.4 MG: 0.2 INJECTION INTRAMUSCULAR; INTRAVENOUS at 05:03

## 2023-01-01 RX ADMIN — DOCUSATE SODIUM 50MG AND SENNOSIDES 8.6MG 2 TABLET: 8.6; 5 TABLET, FILM COATED ORAL at 20:21

## 2023-01-01 RX ADMIN — CALCIUM GLUCONATE 1 G: 20 INJECTION, SOLUTION INTRAVENOUS at 13:49

## 2023-01-01 RX ADMIN — MORPHINE SULFATE 2 MG: 2 INJECTION, SOLUTION INTRAMUSCULAR; INTRAVENOUS at 13:21

## 2023-01-01 RX ADMIN — LEVOTHYROXINE SODIUM 275 MCG: 0.17 TABLET ORAL at 06:21

## 2023-01-01 RX ADMIN — GLYCOPYRROLATE 0.4 MG: 0.2 INJECTION INTRAMUSCULAR; INTRAVENOUS at 17:16

## 2023-01-01 RX ADMIN — LEVETIRACETAM 1000 MG: 100 INJECTION, SOLUTION INTRAVENOUS at 18:47

## 2023-01-01 RX ADMIN — LACTULOSE 10 G: 10 SOLUTION ORAL at 20:21

## 2023-01-01 RX ADMIN — FENTANYL CITRATE 50 MCG: 50 INJECTION, SOLUTION INTRAMUSCULAR; INTRAVENOUS at 15:52

## 2023-01-01 RX ADMIN — PROPOFOL 60 MG: 10 INJECTION, EMULSION INTRAVENOUS at 14:06

## 2023-01-01 RX ADMIN — SODIUM CHLORIDE 200 MG PE: 9 INJECTION, SOLUTION INTRAMUSCULAR; INTRAVENOUS; SUBCUTANEOUS at 22:27

## 2023-01-01 RX ADMIN — MORPHINE SULFATE 2 MG: 2 INJECTION, SOLUTION INTRAMUSCULAR; INTRAVENOUS at 05:03

## 2023-01-01 RX ADMIN — SODIUM PHOSPHATE, MONOBASIC, MONOHYDRATE AND SODIUM PHOSPHATE, DIBASIC, ANHYDROUS 15 MMOL: 276; 142 INJECTION, SOLUTION INTRAVENOUS at 09:18

## 2023-01-01 RX ADMIN — MORPHINE SULFATE 2 MG: 2 INJECTION, SOLUTION INTRAMUSCULAR; INTRAVENOUS at 20:01

## 2023-01-01 RX ADMIN — ONDANSETRON 4 MG: 2 INJECTION INTRAMUSCULAR; INTRAVENOUS at 15:22

## 2023-01-01 RX ADMIN — NEOSTIGMINE METHYLSULFATE 5 MG: 0.5 INJECTION INTRAVENOUS at 15:22

## 2023-01-01 RX ADMIN — DEXTROSE MONOHYDRATE 50 ML/HR: 100 INJECTION, SOLUTION INTRAVENOUS at 08:17

## 2023-01-01 RX ADMIN — MORPHINE SULFATE 2 MG: 2 INJECTION, SOLUTION INTRAMUSCULAR; INTRAVENOUS at 01:16

## 2023-01-01 RX ADMIN — LEVETIRACETAM 1000 MG: 100 INJECTION, SOLUTION INTRAVENOUS at 00:55

## 2023-01-01 RX ADMIN — DEXTROSE MONOHYDRATE 25 G: 25 INJECTION, SOLUTION INTRAVENOUS at 05:23

## 2023-01-01 RX ADMIN — HYDROMORPHONE HYDROCHLORIDE 0.5 MG: 1 INJECTION, SOLUTION INTRAMUSCULAR; INTRAVENOUS; SUBCUTANEOUS at 17:03

## 2023-01-01 RX ADMIN — LEVETIRACETAM 1000 MG: 100 INJECTION, SOLUTION INTRAVENOUS at 12:06

## 2023-01-01 RX ADMIN — HYDROCODONE BITARTRATE AND ACETAMINOPHEN 1 TABLET: 5; 325 TABLET ORAL at 20:21

## 2023-01-01 RX ADMIN — SODIUM CHLORIDE 200 MG PE: 9 INJECTION, SOLUTION INTRAMUSCULAR; INTRAVENOUS; SUBCUTANEOUS at 14:33

## 2023-01-01 RX ADMIN — HYDRALAZINE HYDROCHLORIDE 5 MG: 20 INJECTION INTRAMUSCULAR; INTRAVENOUS at 03:37

## 2023-01-01 RX ADMIN — SODIUM CHLORIDE 200 MG PE: 9 INJECTION, SOLUTION INTRAMUSCULAR; INTRAVENOUS; SUBCUTANEOUS at 08:56

## 2023-01-01 RX ADMIN — Medication 10 ML: at 20:21

## 2023-01-01 RX ADMIN — FOLIC ACID 1 MG: 1 TABLET ORAL at 09:32

## 2023-01-01 RX ADMIN — CARVEDILOL 25 MG: 25 TABLET, FILM COATED ORAL at 20:21

## 2023-01-01 RX ADMIN — HYDRALAZINE HYDROCHLORIDE 10 MG: 20 INJECTION INTRAMUSCULAR; INTRAVENOUS at 08:23

## 2023-01-01 NOTE — ANESTHESIA POSTPROCEDURE EVALUATION
Patient: Zaina Martinez    Procedure Summary     Date: 01/01/23 Room / Location:  [facility]    Anesthesia Start: 1354 Anesthesia Stop: 1535    Procedure: LEFT KIDNEY EMBOLIZATION Diagnosis:     Surgeons: Bijan Ordoñez MD Provider: Aureliano Blankenship MD    Anesthesia Type: general ASA Status: 4 - Emergent          Anesthesia Type: general    Vitals  Vitals Value Taken Time   /74 01/01/23 1616   Temp 37 °C (98.6 °F) 01/01/23 1620   Pulse 83 01/01/23 1629   Resp 16 01/01/23 1545   SpO2 94 % 01/01/23 1629   Vitals shown include unvalidated device data.        Post Anesthesia Care and Evaluation    Patient location during evaluation: PACU  Patient participation: complete - patient participated  Level of consciousness: awake  Pain management: satisfactory to patient    Airway patency: patent  Anesthetic complications: No anesthetic complications  PONV Status: controlled  Cardiovascular status: acceptable  Respiratory status: acceptable  Hydration status: acceptable

## 2023-01-01 NOTE — ANESTHESIA PREPROCEDURE EVALUATION
Anesthesia Evaluation     Patient summary reviewed and Nursing notes reviewed   NPO Solid Status: > 6 hours  NPO Liquid Status: > 2 hours           Airway   Mallampati: II  TM distance: >3 FB  Neck ROM: full  Dental    (+) edentulous    Pulmonary    (+) pneumonia resolved , pleural effusion, shortness of breath, recent URI,   Cardiovascular   Exercise tolerance: poor (<4 METS)    ECG reviewed    (+) hypertension, valvular problems/murmurs, past MI , CAD, dysrhythmias Atrial Flutter, CHF , hyperlipidemia,       Neuro/Psych  (+) CVA, psychiatric history Depression,    GI/Hepatic/Renal/Endo    (+)  GERD,  renal disease ESRD, diabetes mellitus type 2 using insulin, thyroid problem hypothyroidism    Musculoskeletal     Abdominal    Substance History      OB/GYN          Other   blood dyscrasia anemia,                   Anesthesia Plan    ASA 4 - emergent     general       Anesthetic plan, risks, benefits, and alternatives have been provided, discussed and informed consent has been obtained with: patient.        CODE STATUS:    Code Status (Patient has no pulse and is not breathing): CPR (Attempt to Resuscitate)  Medical Interventions (Patient has pulse or is breathing): Full Support

## 2023-01-01 NOTE — ANESTHESIA PROCEDURE NOTES
Airway  Date/Time: 1/1/2023 2:07 PM  Airway not difficult    General Information and Staff    Anesthesiologist: Aureliano Blankenship MD    Indications and Patient Condition    Preoxygenated: yes  Mask difficulty assessment: 0 - not attempted    Final Airway Details  Final airway type: endotracheal airway      Successful airway: ETT  Cuffed: yes   Successful intubation technique: direct laryngoscopy  Endotracheal tube insertion site: oral  Blade: Ester  Blade size: 3  ETT size (mm): 7.0  Cormack-Lehane Classification: grade I - full view of glottis  Placement verified by: chest auscultation and capnometry   Measured from: teeth  ETT/EBT  to teeth (cm): 20  Assessment: lips, teeth, and gum same as pre-op and atraumatic intubation

## 2023-01-02 LAB
ALBUMIN SERPL-MCNC: 2.8 G/DL (ref 3.5–5.2)
ANION GAP SERPL CALCULATED.3IONS-SCNC: 10.4 MMOL/L (ref 5–15)
APTT PPP: 41.9 SECONDS (ref 22.7–35.4)
BUN SERPL-MCNC: 27 MG/DL (ref 6–20)
BUN/CREAT SERPL: 8.7 (ref 7–25)
CALCIUM SPEC-SCNC: 7.9 MG/DL (ref 8.6–10.5)
CHLORIDE SERPL-SCNC: 97 MMOL/L (ref 98–107)
CO2 SERPL-SCNC: 25.6 MMOL/L (ref 22–29)
CREAT SERPL-MCNC: 3.11 MG/DL (ref 0.57–1)
DEPRECATED RDW RBC AUTO: 47.8 FL (ref 37–54)
EGFRCR SERPLBLD CKD-EPI 2021: 16.9 ML/MIN/1.73
ERYTHROCYTE [DISTWIDTH] IN BLOOD BY AUTOMATED COUNT: 15.1 % (ref 12.3–15.4)
FIBRINOGEN PPP-MCNC: 267 MG/DL (ref 219–464)
GLUCOSE BLDC GLUCOMTR-MCNC: 169 MG/DL (ref 70–130)
GLUCOSE BLDC GLUCOMTR-MCNC: 170 MG/DL (ref 70–130)
GLUCOSE BLDC GLUCOMTR-MCNC: 171 MG/DL (ref 70–130)
GLUCOSE BLDC GLUCOMTR-MCNC: 191 MG/DL (ref 70–130)
GLUCOSE BLDC GLUCOMTR-MCNC: 230 MG/DL (ref 70–130)
GLUCOSE BLDC GLUCOMTR-MCNC: 259 MG/DL (ref 70–130)
GLUCOSE SERPL-MCNC: 238 MG/DL (ref 65–99)
HCT VFR BLD AUTO: 20.3 % (ref 34–46.6)
HCT VFR BLD AUTO: 21.6 % (ref 34–46.6)
HCT VFR BLD AUTO: 22.5 % (ref 34–46.6)
HCT VFR BLD AUTO: 25.9 % (ref 34–46.6)
HGB BLD-MCNC: 6.8 G/DL (ref 12–15.9)
HGB BLD-MCNC: 7.1 G/DL (ref 12–15.9)
HGB BLD-MCNC: 7.3 G/DL (ref 12–15.9)
HGB BLD-MCNC: 8.7 G/DL (ref 12–15.9)
INR PPP: 1.22 (ref 0.9–1.1)
MCH RBC QN AUTO: 29.3 PG (ref 26.6–33)
MCHC RBC AUTO-ENTMCNC: 33.6 G/DL (ref 31.5–35.7)
MCV RBC AUTO: 87.2 FL (ref 79–97)
PHOSPHATE SERPL-MCNC: 3.9 MG/DL (ref 2.5–4.5)
PLATELET # BLD AUTO: 150 10*3/MM3 (ref 140–450)
PMV BLD AUTO: 10.5 FL (ref 6–12)
POTASSIUM SERPL-SCNC: 4.1 MMOL/L (ref 3.5–5.2)
PROTHROMBIN TIME: 15.6 SECONDS (ref 11.7–14.2)
QT INTERVAL: 397 MS
RBC # BLD AUTO: 2.97 10*6/MM3 (ref 3.77–5.28)
SODIUM SERPL-SCNC: 133 MMOL/L (ref 136–145)
WBC NRBC COR # BLD: 14.47 10*3/MM3 (ref 3.4–10.8)

## 2023-01-02 PROCEDURE — 85610 PROTHROMBIN TIME: CPT | Performed by: RADIOLOGY

## 2023-01-02 PROCEDURE — 25010000002 HEPARIN (PORCINE) PER 1000 UNITS: Performed by: RADIOLOGY

## 2023-01-02 PROCEDURE — 85018 HEMOGLOBIN: CPT | Performed by: HOSPITALIST

## 2023-01-02 PROCEDURE — 99232 SBSQ HOSP IP/OBS MODERATE 35: CPT | Performed by: INTERNAL MEDICINE

## 2023-01-02 PROCEDURE — P9016 RBC LEUKOCYTES REDUCED: HCPCS

## 2023-01-02 PROCEDURE — 85730 THROMBOPLASTIN TIME PARTIAL: CPT | Performed by: RADIOLOGY

## 2023-01-02 PROCEDURE — 85014 HEMATOCRIT: CPT | Performed by: RADIOLOGY

## 2023-01-02 PROCEDURE — 80069 RENAL FUNCTION PANEL: CPT | Performed by: RADIOLOGY

## 2023-01-02 PROCEDURE — 82962 GLUCOSE BLOOD TEST: CPT

## 2023-01-02 PROCEDURE — 86900 BLOOD TYPING SEROLOGIC ABO: CPT

## 2023-01-02 PROCEDURE — 85018 HEMOGLOBIN: CPT | Performed by: RADIOLOGY

## 2023-01-02 PROCEDURE — 25010000002 ONDANSETRON PER 1 MG: Performed by: RADIOLOGY

## 2023-01-02 PROCEDURE — 63710000001 INSULIN LISPRO (HUMAN) PER 5 UNITS: Performed by: RADIOLOGY

## 2023-01-02 PROCEDURE — 85014 HEMATOCRIT: CPT | Performed by: HOSPITALIST

## 2023-01-02 PROCEDURE — 85027 COMPLETE CBC AUTOMATED: CPT | Performed by: RADIOLOGY

## 2023-01-02 PROCEDURE — 25010000002 HYDROMORPHONE PER 4 MG: Performed by: RADIOLOGY

## 2023-01-02 PROCEDURE — 85384 FIBRINOGEN ACTIVITY: CPT | Performed by: RADIOLOGY

## 2023-01-02 RX ORDER — ALBUMIN (HUMAN) 12.5 G/50ML
12.5 SOLUTION INTRAVENOUS AS NEEDED
Status: ACTIVE | OUTPATIENT
Start: 2023-01-02 | End: 2023-01-02

## 2023-01-02 RX ADMIN — HEPARIN SODIUM 4000 UNITS: 1000 INJECTION INTRAVENOUS; SUBCUTANEOUS at 14:21

## 2023-01-02 RX ADMIN — HYDROCODONE BITARTRATE AND ACETAMINOPHEN 1 TABLET: 5; 325 TABLET ORAL at 12:00

## 2023-01-02 RX ADMIN — HYDROMORPHONE HYDROCHLORIDE 0.5 MG: 1 INJECTION, SOLUTION INTRAMUSCULAR; INTRAVENOUS; SUBCUTANEOUS at 19:34

## 2023-01-02 RX ADMIN — LEVOTHYROXINE SODIUM 275 MCG: 0.17 TABLET ORAL at 06:09

## 2023-01-02 RX ADMIN — LIDOCAINE 2 PATCH: 700 PATCH TOPICAL at 08:14

## 2023-01-02 RX ADMIN — DEXTROSE MONOHYDRATE 75 ML/HR: 100 INJECTION, SOLUTION INTRAVENOUS at 02:20

## 2023-01-02 RX ADMIN — Medication 10 ML: at 09:00

## 2023-01-02 RX ADMIN — AMLODIPINE BESYLATE 10 MG: 10 TABLET ORAL at 08:14

## 2023-01-02 RX ADMIN — HYDROMORPHONE HYDROCHLORIDE 0.5 MG: 1 INJECTION, SOLUTION INTRAMUSCULAR; INTRAVENOUS; SUBCUTANEOUS at 12:41

## 2023-01-02 RX ADMIN — HYDROMORPHONE HYDROCHLORIDE 0.5 MG: 1 INJECTION, SOLUTION INTRAMUSCULAR; INTRAVENOUS; SUBCUTANEOUS at 08:14

## 2023-01-02 RX ADMIN — DOCUSATE SODIUM 50MG AND SENNOSIDES 8.6MG 2 TABLET: 8.6; 5 TABLET, FILM COATED ORAL at 20:55

## 2023-01-02 RX ADMIN — HEPARIN SODIUM 4000 UNITS: 1000 INJECTION INTRAVENOUS; SUBCUTANEOUS at 14:30

## 2023-01-02 RX ADMIN — ONDANSETRON 4 MG: 2 INJECTION INTRAMUSCULAR; INTRAVENOUS at 08:19

## 2023-01-02 RX ADMIN — CARVEDILOL 25 MG: 25 TABLET, FILM COATED ORAL at 20:55

## 2023-01-02 RX ADMIN — PANTOPRAZOLE SODIUM 40 MG: 40 TABLET, DELAYED RELEASE ORAL at 08:14

## 2023-01-02 RX ADMIN — HYDROCODONE BITARTRATE AND ACETAMINOPHEN 1 TABLET: 5; 325 TABLET ORAL at 03:59

## 2023-01-02 RX ADMIN — LACTULOSE 10 G: 10 SOLUTION ORAL at 20:55

## 2023-01-02 RX ADMIN — LACTULOSE 10 G: 10 SOLUTION ORAL at 08:14

## 2023-01-02 RX ADMIN — HYDROMORPHONE HYDROCHLORIDE 0.5 MG: 1 INJECTION, SOLUTION INTRAMUSCULAR; INTRAVENOUS; SUBCUTANEOUS at 04:16

## 2023-01-02 RX ADMIN — CARVEDILOL 25 MG: 25 TABLET, FILM COATED ORAL at 08:14

## 2023-01-02 RX ADMIN — Medication 10 ML: at 20:55

## 2023-01-02 RX ADMIN — FOLIC ACID 1 MG: 1 TABLET ORAL at 08:14

## 2023-01-02 RX ADMIN — HYDROMORPHONE HYDROCHLORIDE 0.5 MG: 1 INJECTION, SOLUTION INTRAMUSCULAR; INTRAVENOUS; SUBCUTANEOUS at 15:45

## 2023-01-02 RX ADMIN — HYDROCODONE BITARTRATE AND ACETAMINOPHEN 1 TABLET: 5; 325 TABLET ORAL at 19:07

## 2023-01-03 ENCOUNTER — APPOINTMENT (OUTPATIENT)
Dept: GENERAL RADIOLOGY | Facility: HOSPITAL | Age: 58
DRG: 270 | End: 2023-01-03
Payer: MEDICARE

## 2023-01-03 LAB
ALBUMIN SERPL-MCNC: 2.8 G/DL (ref 3.5–5.2)
ANION GAP SERPL CALCULATED.3IONS-SCNC: 8 MMOL/L (ref 5–15)
BUN SERPL-MCNC: 17 MG/DL (ref 6–20)
BUN/CREAT SERPL: 8.5 (ref 7–25)
CALCIUM SPEC-SCNC: 7.8 MG/DL (ref 8.6–10.5)
CHLORIDE SERPL-SCNC: 101 MMOL/L (ref 98–107)
CO2 SERPL-SCNC: 25 MMOL/L (ref 22–29)
CREAT SERPL-MCNC: 2 MG/DL (ref 0.57–1)
DEPRECATED RDW RBC AUTO: 48.3 FL (ref 37–54)
EGFRCR SERPLBLD CKD-EPI 2021: 28.7 ML/MIN/1.73
ERYTHROCYTE [DISTWIDTH] IN BLOOD BY AUTOMATED COUNT: 15 % (ref 12.3–15.4)
GLUCOSE BLDC GLUCOMTR-MCNC: 288 MG/DL (ref 70–130)
GLUCOSE BLDC GLUCOMTR-MCNC: 307 MG/DL (ref 70–130)
GLUCOSE BLDC GLUCOMTR-MCNC: 325 MG/DL (ref 70–130)
GLUCOSE SERPL-MCNC: 242 MG/DL (ref 65–99)
HCT VFR BLD AUTO: 20.7 % (ref 34–46.6)
HCT VFR BLD AUTO: 21.8 % (ref 34–46.6)
HCT VFR BLD AUTO: 21.8 % (ref 34–46.6)
HGB BLD-MCNC: 6.7 G/DL (ref 12–15.9)
HGB BLD-MCNC: 7.7 G/DL (ref 12–15.9)
HGB BLD-MCNC: 7.7 G/DL (ref 12–15.9)
MCH RBC QN AUTO: 30.7 PG (ref 26.6–33)
MCHC RBC AUTO-ENTMCNC: 35.3 G/DL (ref 31.5–35.7)
MCV RBC AUTO: 86.9 FL (ref 79–97)
PHOSPHATE SERPL-MCNC: 2.7 MG/DL (ref 2.5–4.5)
PLATELET # BLD AUTO: 166 10*3/MM3 (ref 140–450)
PMV BLD AUTO: 10.1 FL (ref 6–12)
POTASSIUM SERPL-SCNC: 3.9 MMOL/L (ref 3.5–5.2)
RBC # BLD AUTO: 2.51 10*6/MM3 (ref 3.77–5.28)
SODIUM SERPL-SCNC: 134 MMOL/L (ref 136–145)
WBC NRBC COR # BLD: 11.58 10*3/MM3 (ref 3.4–10.8)

## 2023-01-03 PROCEDURE — 82962 GLUCOSE BLOOD TEST: CPT

## 2023-01-03 PROCEDURE — 85027 COMPLETE CBC AUTOMATED: CPT | Performed by: RADIOLOGY

## 2023-01-03 PROCEDURE — 36430 TRANSFUSION BLD/BLD COMPNT: CPT

## 2023-01-03 PROCEDURE — 85018 HEMOGLOBIN: CPT | Performed by: INTERNAL MEDICINE

## 2023-01-03 PROCEDURE — 80069 RENAL FUNCTION PANEL: CPT | Performed by: RADIOLOGY

## 2023-01-03 PROCEDURE — 25010000002 HYDROMORPHONE PER 4 MG: Performed by: RADIOLOGY

## 2023-01-03 PROCEDURE — 85014 HEMATOCRIT: CPT | Performed by: INTERNAL MEDICINE

## 2023-01-03 PROCEDURE — 63710000001 INSULIN LISPRO (HUMAN) PER 5 UNITS: Performed by: RADIOLOGY

## 2023-01-03 PROCEDURE — P9016 RBC LEUKOCYTES REDUCED: HCPCS

## 2023-01-03 PROCEDURE — 63710000001 INSULIN LISPRO (HUMAN) PER 5 UNITS: Performed by: INTERNAL MEDICINE

## 2023-01-03 PROCEDURE — 85014 HEMATOCRIT: CPT | Performed by: HOSPITALIST

## 2023-01-03 PROCEDURE — 99231 SBSQ HOSP IP/OBS SF/LOW 25: CPT | Performed by: INTERNAL MEDICINE

## 2023-01-03 PROCEDURE — 97530 THERAPEUTIC ACTIVITIES: CPT

## 2023-01-03 PROCEDURE — 25010000002 HYDROMORPHONE PER 4 MG: Performed by: INTERNAL MEDICINE

## 2023-01-03 PROCEDURE — 86900 BLOOD TYPING SEROLOGIC ABO: CPT

## 2023-01-03 PROCEDURE — 85018 HEMOGLOBIN: CPT | Performed by: HOSPITALIST

## 2023-01-03 RX ORDER — AMOXICILLIN 250 MG
2 CAPSULE ORAL NIGHTLY PRN
Status: DISCONTINUED | OUTPATIENT
Start: 2023-01-03 | End: 2023-01-09

## 2023-01-03 RX ORDER — LACTULOSE 10 G/15ML
10 SOLUTION ORAL 2 TIMES DAILY PRN
Status: DISCONTINUED | OUTPATIENT
Start: 2023-01-03 | End: 2023-01-29 | Stop reason: HOSPADM

## 2023-01-03 RX ADMIN — INSULIN LISPRO 5 UNITS: 100 INJECTION, SOLUTION INTRAVENOUS; SUBCUTANEOUS at 07:59

## 2023-01-03 RX ADMIN — HYDROMORPHONE HYDROCHLORIDE 0.5 MG: 1 INJECTION, SOLUTION INTRAMUSCULAR; INTRAVENOUS; SUBCUTANEOUS at 19:47

## 2023-01-03 RX ADMIN — Medication 10 ML: at 08:39

## 2023-01-03 RX ADMIN — Medication 10 ML: at 20:23

## 2023-01-03 RX ADMIN — AMLODIPINE BESYLATE 10 MG: 10 TABLET ORAL at 08:37

## 2023-01-03 RX ADMIN — LACTULOSE 10 G: 10 SOLUTION ORAL at 08:38

## 2023-01-03 RX ADMIN — INSULIN LISPRO 5 UNITS: 100 INJECTION, SOLUTION INTRAVENOUS; SUBCUTANEOUS at 11:13

## 2023-01-03 RX ADMIN — HYDROCODONE BITARTRATE AND ACETAMINOPHEN 1 TABLET: 5; 325 TABLET ORAL at 03:27

## 2023-01-03 RX ADMIN — FOLIC ACID 1 MG: 1 TABLET ORAL at 08:37

## 2023-01-03 RX ADMIN — INSULIN LISPRO 4 UNITS: 100 INJECTION, SOLUTION INTRAVENOUS; SUBCUTANEOUS at 16:44

## 2023-01-03 RX ADMIN — CARVEDILOL 25 MG: 25 TABLET, FILM COATED ORAL at 20:23

## 2023-01-03 RX ADMIN — CARVEDILOL 25 MG: 25 TABLET, FILM COATED ORAL at 08:37

## 2023-01-03 RX ADMIN — PANTOPRAZOLE SODIUM 40 MG: 40 TABLET, DELAYED RELEASE ORAL at 08:39

## 2023-01-03 RX ADMIN — HYDROMORPHONE HYDROCHLORIDE 0.5 MG: 1 INJECTION, SOLUTION INTRAMUSCULAR; INTRAVENOUS; SUBCUTANEOUS at 07:59

## 2023-01-03 RX ADMIN — HYDROMORPHONE HYDROCHLORIDE 0.5 MG: 1 INJECTION, SOLUTION INTRAMUSCULAR; INTRAVENOUS; SUBCUTANEOUS at 14:47

## 2023-01-03 RX ADMIN — HYDROMORPHONE HYDROCHLORIDE 0.5 MG: 1 INJECTION, SOLUTION INTRAMUSCULAR; INTRAVENOUS; SUBCUTANEOUS at 00:05

## 2023-01-03 RX ADMIN — HYDROCODONE BITARTRATE AND ACETAMINOPHEN 1 TABLET: 5; 325 TABLET ORAL at 12:19

## 2023-01-03 RX ADMIN — HYDROMORPHONE HYDROCHLORIDE 0.5 MG: 1 INJECTION, SOLUTION INTRAMUSCULAR; INTRAVENOUS; SUBCUTANEOUS at 22:53

## 2023-01-03 RX ADMIN — LEVOTHYROXINE SODIUM 275 MCG: 0.17 TABLET ORAL at 05:33

## 2023-01-04 LAB
ABO GROUP BLD: NORMAL
ALBUMIN SERPL-MCNC: 2.8 G/DL (ref 3.5–5.2)
ALBUMIN/GLOB SERPL: 1 G/DL
ALP SERPL-CCNC: 157 U/L (ref 39–117)
ALT SERPL W P-5'-P-CCNC: <5 U/L (ref 1–33)
ANION GAP SERPL CALCULATED.3IONS-SCNC: 10 MMOL/L (ref 5–15)
AST SERPL-CCNC: 18 U/L (ref 1–32)
BASOPHILS # BLD AUTO: 0.03 10*3/MM3 (ref 0–0.2)
BASOPHILS NFR BLD AUTO: 0.2 % (ref 0–1.5)
BH BB BLOOD EXPIRATION DATE: NORMAL
BH BB BLOOD TYPE BARCODE: 5100
BH BB DISPENSE STATUS: NORMAL
BH BB PRODUCT CODE: NORMAL
BH BB UNIT NUMBER: NORMAL
BILIRUB SERPL-MCNC: 0.7 MG/DL (ref 0–1.2)
BLD GP AB SCN SERPL QL: NEGATIVE
BUN SERPL-MCNC: 30 MG/DL (ref 6–20)
BUN/CREAT SERPL: 10.5 (ref 7–25)
CALCIUM SPEC-SCNC: 7.8 MG/DL (ref 8.6–10.5)
CHLORIDE SERPL-SCNC: 97 MMOL/L (ref 98–107)
CO2 SERPL-SCNC: 23 MMOL/L (ref 22–29)
CREAT SERPL-MCNC: 2.87 MG/DL (ref 0.57–1)
CROSSMATCH INTERPRETATION: NORMAL
DEPRECATED RDW RBC AUTO: 46.5 FL (ref 37–54)
EGFRCR SERPLBLD CKD-EPI 2021: 18.6 ML/MIN/1.73
EOSINOPHIL # BLD AUTO: 0.11 10*3/MM3 (ref 0–0.4)
EOSINOPHIL NFR BLD AUTO: 0.7 % (ref 0.3–6.2)
ERYTHROCYTE [DISTWIDTH] IN BLOOD BY AUTOMATED COUNT: 14.8 % (ref 12.3–15.4)
GLOBULIN UR ELPH-MCNC: 2.8 GM/DL
GLUCOSE BLDC GLUCOMTR-MCNC: 160 MG/DL (ref 70–130)
GLUCOSE BLDC GLUCOMTR-MCNC: 195 MG/DL (ref 70–130)
GLUCOSE BLDC GLUCOMTR-MCNC: 320 MG/DL (ref 70–130)
GLUCOSE SERPL-MCNC: 134 MG/DL (ref 65–99)
HCT VFR BLD AUTO: 26 % (ref 34–46.6)
HCT VFR BLD AUTO: 26.9 % (ref 34–46.6)
HGB BLD-MCNC: 8.4 G/DL (ref 12–15.9)
HGB BLD-MCNC: 8.7 G/DL (ref 12–15.9)
IMM GRANULOCYTES # BLD AUTO: 0.32 10*3/MM3 (ref 0–0.05)
IMM GRANULOCYTES NFR BLD AUTO: 2 % (ref 0–0.5)
LYMPHOCYTES # BLD AUTO: 1.25 10*3/MM3 (ref 0.7–3.1)
LYMPHOCYTES NFR BLD AUTO: 7.7 % (ref 19.6–45.3)
MCH RBC QN AUTO: 27.8 PG (ref 26.6–33)
MCHC RBC AUTO-ENTMCNC: 32.3 G/DL (ref 31.5–35.7)
MCV RBC AUTO: 85.9 FL (ref 79–97)
MONOCYTES # BLD AUTO: 1.89 10*3/MM3 (ref 0.1–0.9)
MONOCYTES NFR BLD AUTO: 11.6 % (ref 5–12)
NEUTROPHILS NFR BLD AUTO: 12.65 10*3/MM3 (ref 1.7–7)
NEUTROPHILS NFR BLD AUTO: 77.8 % (ref 42.7–76)
NRBC BLD AUTO-RTO: 0.1 /100 WBC (ref 0–0.2)
PHOSPHATE SERPL-MCNC: 2.1 MG/DL (ref 2.5–4.5)
PLATELET # BLD AUTO: 150 10*3/MM3 (ref 140–450)
PMV BLD AUTO: 10.5 FL (ref 6–12)
POTASSIUM SERPL-SCNC: 4.5 MMOL/L (ref 3.5–5.2)
PROT SERPL-MCNC: 5.6 G/DL (ref 6–8.5)
RBC # BLD AUTO: 3.13 10*6/MM3 (ref 3.77–5.28)
RH BLD: POSITIVE
SODIUM SERPL-SCNC: 130 MMOL/L (ref 136–145)
T&S EXPIRATION DATE: NORMAL
UNIT  ABO: NORMAL
UNIT  RH: NORMAL
WBC NRBC COR # BLD: 16.25 10*3/MM3 (ref 3.4–10.8)

## 2023-01-04 PROCEDURE — 86900 BLOOD TYPING SEROLOGIC ABO: CPT | Performed by: INTERNAL MEDICINE

## 2023-01-04 PROCEDURE — 63710000001 INSULIN LISPRO (HUMAN) PER 5 UNITS: Performed by: INTERNAL MEDICINE

## 2023-01-04 PROCEDURE — 25010000002 HEPARIN (PORCINE) PER 1000 UNITS: Performed by: INTERNAL MEDICINE

## 2023-01-04 PROCEDURE — 25010000002 HYDROMORPHONE PER 4 MG: Performed by: INTERNAL MEDICINE

## 2023-01-04 PROCEDURE — 84100 ASSAY OF PHOSPHORUS: CPT | Performed by: INTERNAL MEDICINE

## 2023-01-04 PROCEDURE — 80053 COMPREHEN METABOLIC PANEL: CPT | Performed by: INTERNAL MEDICINE

## 2023-01-04 PROCEDURE — 85025 COMPLETE CBC W/AUTO DIFF WBC: CPT | Performed by: HOSPITALIST

## 2023-01-04 PROCEDURE — 82962 GLUCOSE BLOOD TEST: CPT

## 2023-01-04 PROCEDURE — 25010000002 EPOETIN ALFA-EPBX 10000 UNIT/ML SOLUTION: Performed by: INTERNAL MEDICINE

## 2023-01-04 PROCEDURE — 86850 RBC ANTIBODY SCREEN: CPT | Performed by: INTERNAL MEDICINE

## 2023-01-04 PROCEDURE — 86901 BLOOD TYPING SEROLOGIC RH(D): CPT | Performed by: INTERNAL MEDICINE

## 2023-01-04 PROCEDURE — 99231 SBSQ HOSP IP/OBS SF/LOW 25: CPT | Performed by: INTERNAL MEDICINE

## 2023-01-04 RX ORDER — HYDROCODONE BITARTRATE AND ACETAMINOPHEN 5; 325 MG/1; MG/1
1 TABLET ORAL EVERY 6 HOURS PRN
Status: DISCONTINUED | OUTPATIENT
Start: 2023-01-04 | End: 2023-01-11

## 2023-01-04 RX ADMIN — EPOETIN ALFA-EPBX 10000 UNITS: 10000 INJECTION, SOLUTION INTRAVENOUS; SUBCUTANEOUS at 11:25

## 2023-01-04 RX ADMIN — HYDROMORPHONE HYDROCHLORIDE 0.5 MG: 1 INJECTION, SOLUTION INTRAMUSCULAR; INTRAVENOUS; SUBCUTANEOUS at 04:04

## 2023-01-04 RX ADMIN — LIDOCAINE 2 PATCH: 700 PATCH TOPICAL at 13:29

## 2023-01-04 RX ADMIN — HEPARIN SODIUM 4000 UNITS: 1000 INJECTION INTRAVENOUS; SUBCUTANEOUS at 11:26

## 2023-01-04 RX ADMIN — Medication 10 ML: at 13:27

## 2023-01-04 RX ADMIN — HYDROMORPHONE HYDROCHLORIDE 0.5 MG: 1 INJECTION, SOLUTION INTRAMUSCULAR; INTRAVENOUS; SUBCUTANEOUS at 17:14

## 2023-01-04 RX ADMIN — FOLIC ACID 1 MG: 1 TABLET ORAL at 13:34

## 2023-01-04 RX ADMIN — Medication 10 ML: at 21:10

## 2023-01-04 RX ADMIN — AMLODIPINE BESYLATE 10 MG: 10 TABLET ORAL at 13:29

## 2023-01-04 RX ADMIN — HYDROCODONE BITARTRATE AND ACETAMINOPHEN 1 TABLET: 5; 325 TABLET ORAL at 21:09

## 2023-01-04 RX ADMIN — INSULIN LISPRO 2 UNITS: 100 INJECTION, SOLUTION INTRAVENOUS; SUBCUTANEOUS at 13:26

## 2023-01-04 RX ADMIN — HYDROMORPHONE HYDROCHLORIDE 0.5 MG: 1 INJECTION, SOLUTION INTRAMUSCULAR; INTRAVENOUS; SUBCUTANEOUS at 07:17

## 2023-01-04 RX ADMIN — PANTOPRAZOLE SODIUM 40 MG: 40 TABLET, DELAYED RELEASE ORAL at 13:29

## 2023-01-04 RX ADMIN — INSULIN LISPRO 5 UNITS: 100 INJECTION, SOLUTION INTRAVENOUS; SUBCUTANEOUS at 17:10

## 2023-01-04 RX ADMIN — HYDROMORPHONE HYDROCHLORIDE 0.5 MG: 1 INJECTION, SOLUTION INTRAMUSCULAR; INTRAVENOUS; SUBCUTANEOUS at 13:26

## 2023-01-04 RX ADMIN — CARVEDILOL 25 MG: 25 TABLET, FILM COATED ORAL at 21:09

## 2023-01-04 RX ADMIN — HYDROMORPHONE HYDROCHLORIDE 0.5 MG: 1 INJECTION, SOLUTION INTRAMUSCULAR; INTRAVENOUS; SUBCUTANEOUS at 22:30

## 2023-01-04 RX ADMIN — LEVOTHYROXINE SODIUM 275 MCG: 0.17 TABLET ORAL at 05:35

## 2023-01-04 RX ADMIN — INSULIN GLARGINE-YFGN 3 UNITS: 100 INJECTION, SOLUTION SUBCUTANEOUS at 21:09

## 2023-01-04 RX ADMIN — ACETAMINOPHEN 650 MG: 325 TABLET, FILM COATED ORAL at 21:09

## 2023-01-04 RX ADMIN — CARVEDILOL 25 MG: 25 TABLET, FILM COATED ORAL at 13:29

## 2023-01-05 LAB
ALBUMIN SERPL-MCNC: 2.6 G/DL (ref 3.5–5.2)
ANION GAP SERPL CALCULATED.3IONS-SCNC: 10 MMOL/L (ref 5–15)
BASOPHILS # BLD AUTO: 0.05 10*3/MM3 (ref 0–0.2)
BASOPHILS NFR BLD AUTO: 0.3 % (ref 0–1.5)
BUN SERPL-MCNC: 29 MG/DL (ref 6–20)
BUN/CREAT SERPL: 12.3 (ref 7–25)
CALCIUM SPEC-SCNC: 7.3 MG/DL (ref 8.6–10.5)
CHLORIDE SERPL-SCNC: 93 MMOL/L (ref 98–107)
CO2 SERPL-SCNC: 23 MMOL/L (ref 22–29)
CREAT SERPL-MCNC: 2.35 MG/DL (ref 0.57–1)
DEPRECATED RDW RBC AUTO: 49.1 FL (ref 37–54)
EGFRCR SERPLBLD CKD-EPI 2021: 23.6 ML/MIN/1.73
EOSINOPHIL # BLD AUTO: 0.06 10*3/MM3 (ref 0–0.4)
EOSINOPHIL NFR BLD AUTO: 0.4 % (ref 0.3–6.2)
ERYTHROCYTE [DISTWIDTH] IN BLOOD BY AUTOMATED COUNT: 14.4 % (ref 12.3–15.4)
GLUCOSE BLDC GLUCOMTR-MCNC: 258 MG/DL (ref 70–130)
GLUCOSE BLDC GLUCOMTR-MCNC: 298 MG/DL (ref 70–130)
GLUCOSE BLDC GLUCOMTR-MCNC: 310 MG/DL (ref 70–130)
GLUCOSE BLDC GLUCOMTR-MCNC: 392 MG/DL (ref 70–130)
GLUCOSE BLDC GLUCOMTR-MCNC: 437 MG/DL (ref 70–130)
GLUCOSE BLDC GLUCOMTR-MCNC: 443 MG/DL (ref 70–130)
GLUCOSE SERPL-MCNC: 441 MG/DL (ref 65–99)
HBV SURFACE AG SERPL QL IA: NORMAL
HCT VFR BLD AUTO: 29 % (ref 34–46.6)
HGB BLD-MCNC: 9.2 G/DL (ref 12–15.9)
IMM GRANULOCYTES # BLD AUTO: 0.42 10*3/MM3 (ref 0–0.05)
IMM GRANULOCYTES NFR BLD AUTO: 2.6 % (ref 0–0.5)
LYMPHOCYTES # BLD AUTO: 0.99 10*3/MM3 (ref 0.7–3.1)
LYMPHOCYTES NFR BLD AUTO: 6.1 % (ref 19.6–45.3)
MAGNESIUM SERPL-MCNC: 1.7 MG/DL (ref 1.6–2.6)
MCH RBC QN AUTO: 29.1 PG (ref 26.6–33)
MCHC RBC AUTO-ENTMCNC: 31.7 G/DL (ref 31.5–35.7)
MCV RBC AUTO: 91.8 FL (ref 79–97)
MONOCYTES # BLD AUTO: 1.49 10*3/MM3 (ref 0.1–0.9)
MONOCYTES NFR BLD AUTO: 9.2 % (ref 5–12)
NEUTROPHILS NFR BLD AUTO: 13.18 10*3/MM3 (ref 1.7–7)
NEUTROPHILS NFR BLD AUTO: 81.4 % (ref 42.7–76)
NRBC BLD AUTO-RTO: 0.1 /100 WBC (ref 0–0.2)
PHOSPHATE SERPL-MCNC: 1.6 MG/DL (ref 2.5–4.5)
PLATELET # BLD AUTO: 147 10*3/MM3 (ref 140–450)
PMV BLD AUTO: 10.7 FL (ref 6–12)
POTASSIUM SERPL-SCNC: 4 MMOL/L (ref 3.5–5.2)
RBC # BLD AUTO: 3.16 10*6/MM3 (ref 3.77–5.28)
SODIUM SERPL-SCNC: 126 MMOL/L (ref 136–145)
WBC NRBC COR # BLD: 16.19 10*3/MM3 (ref 3.4–10.8)

## 2023-01-05 PROCEDURE — 85025 COMPLETE CBC W/AUTO DIFF WBC: CPT | Performed by: HOSPITALIST

## 2023-01-05 PROCEDURE — 83735 ASSAY OF MAGNESIUM: CPT | Performed by: HOSPITALIST

## 2023-01-05 PROCEDURE — 63710000001 INSULIN LISPRO (HUMAN) PER 5 UNITS: Performed by: HOSPITALIST

## 2023-01-05 PROCEDURE — 63710000001 INSULIN LISPRO (HUMAN) PER 5 UNITS: Performed by: INTERNAL MEDICINE

## 2023-01-05 PROCEDURE — 82962 GLUCOSE BLOOD TEST: CPT

## 2023-01-05 PROCEDURE — 97110 THERAPEUTIC EXERCISES: CPT

## 2023-01-05 PROCEDURE — 87340 HEPATITIS B SURFACE AG IA: CPT | Performed by: INTERNAL MEDICINE

## 2023-01-05 PROCEDURE — 25010000002 HYDROMORPHONE PER 4 MG: Performed by: INTERNAL MEDICINE

## 2023-01-05 PROCEDURE — 80069 RENAL FUNCTION PANEL: CPT | Performed by: INTERNAL MEDICINE

## 2023-01-05 RX ORDER — INSULIN LISPRO 100 [IU]/ML
5 INJECTION, SOLUTION INTRAVENOUS; SUBCUTANEOUS ONCE
Status: COMPLETED | OUTPATIENT
Start: 2023-01-05 | End: 2023-01-05

## 2023-01-05 RX ADMIN — Medication 10 ML: at 20:35

## 2023-01-05 RX ADMIN — HYDROCODONE BITARTRATE AND ACETAMINOPHEN 1 TABLET: 5; 325 TABLET ORAL at 06:36

## 2023-01-05 RX ADMIN — LIDOCAINE 2 PATCH: 700 PATCH TOPICAL at 08:57

## 2023-01-05 RX ADMIN — POTASSIUM PHOSPHATE, MONOBASIC AND POTASSIUM PHOSPHATE, DIBASIC 10 MMOL: 224; 236 INJECTION, SOLUTION, CONCENTRATE INTRAVENOUS at 08:57

## 2023-01-05 RX ADMIN — INSULIN GLARGINE-YFGN 5 UNITS: 100 INJECTION, SOLUTION SUBCUTANEOUS at 20:32

## 2023-01-05 RX ADMIN — HYDROMORPHONE HYDROCHLORIDE 0.5 MG: 1 INJECTION, SOLUTION INTRAMUSCULAR; INTRAVENOUS; SUBCUTANEOUS at 09:09

## 2023-01-05 RX ADMIN — INSULIN LISPRO 7 UNITS: 100 INJECTION, SOLUTION INTRAVENOUS; SUBCUTANEOUS at 06:36

## 2023-01-05 RX ADMIN — HYDROCODONE BITARTRATE AND ACETAMINOPHEN 1 TABLET: 5; 325 TABLET ORAL at 20:32

## 2023-01-05 RX ADMIN — Medication 10 ML: at 08:58

## 2023-01-05 RX ADMIN — PANTOPRAZOLE SODIUM 40 MG: 40 TABLET, DELAYED RELEASE ORAL at 08:57

## 2023-01-05 RX ADMIN — HYDROMORPHONE HYDROCHLORIDE 0.5 MG: 1 INJECTION, SOLUTION INTRAMUSCULAR; INTRAVENOUS; SUBCUTANEOUS at 22:30

## 2023-01-05 RX ADMIN — FOLIC ACID 1 MG: 1 TABLET ORAL at 08:57

## 2023-01-05 RX ADMIN — CARVEDILOL 25 MG: 25 TABLET, FILM COATED ORAL at 20:32

## 2023-01-05 RX ADMIN — AMLODIPINE BESYLATE 10 MG: 10 TABLET ORAL at 08:57

## 2023-01-05 RX ADMIN — LEVOTHYROXINE SODIUM 275 MCG: 0.17 TABLET ORAL at 06:36

## 2023-01-05 RX ADMIN — INSULIN LISPRO 5 UNITS: 100 INJECTION, SOLUTION INTRAVENOUS; SUBCUTANEOUS at 12:10

## 2023-01-05 RX ADMIN — HYDROMORPHONE HYDROCHLORIDE 0.5 MG: 1 INJECTION, SOLUTION INTRAMUSCULAR; INTRAVENOUS; SUBCUTANEOUS at 15:28

## 2023-01-05 RX ADMIN — HYDROCODONE BITARTRATE AND ACETAMINOPHEN 1 TABLET: 5; 325 TABLET ORAL at 12:49

## 2023-01-05 RX ADMIN — CARVEDILOL 25 MG: 25 TABLET, FILM COATED ORAL at 08:57

## 2023-01-05 RX ADMIN — INSULIN LISPRO 5 UNITS: 100 INJECTION, SOLUTION INTRAVENOUS; SUBCUTANEOUS at 06:39

## 2023-01-05 RX ADMIN — INSULIN LISPRO 4 UNITS: 100 INJECTION, SOLUTION INTRAVENOUS; SUBCUTANEOUS at 17:17

## 2023-01-06 LAB
ALBUMIN SERPL-MCNC: 2.6 G/DL (ref 3.5–5.2)
ANION GAP SERPL CALCULATED.3IONS-SCNC: 9 MMOL/L (ref 5–15)
BUN SERPL-MCNC: 44 MG/DL (ref 6–20)
BUN/CREAT SERPL: 14.9 (ref 7–25)
CALCIUM SPEC-SCNC: 7.5 MG/DL (ref 8.6–10.5)
CHLORIDE SERPL-SCNC: 92 MMOL/L (ref 98–107)
CO2 SERPL-SCNC: 24 MMOL/L (ref 22–29)
CREAT SERPL-MCNC: 2.95 MG/DL (ref 0.57–1)
DEPRECATED RDW RBC AUTO: 43.6 FL (ref 37–54)
EGFRCR SERPLBLD CKD-EPI 2021: 18 ML/MIN/1.73
ERYTHROCYTE [DISTWIDTH] IN BLOOD BY AUTOMATED COUNT: 14.2 % (ref 12.3–15.4)
GLUCOSE BLDC GLUCOMTR-MCNC: 231 MG/DL (ref 70–130)
GLUCOSE BLDC GLUCOMTR-MCNC: 260 MG/DL (ref 70–130)
GLUCOSE BLDC GLUCOMTR-MCNC: 302 MG/DL (ref 70–130)
GLUCOSE BLDC GLUCOMTR-MCNC: 305 MG/DL (ref 70–130)
GLUCOSE SERPL-MCNC: 254 MG/DL (ref 65–99)
HCT VFR BLD AUTO: 29.9 % (ref 34–46.6)
HGB BLD-MCNC: 10.2 G/DL (ref 12–15.9)
MCH RBC QN AUTO: 28.7 PG (ref 26.6–33)
MCHC RBC AUTO-ENTMCNC: 34.1 G/DL (ref 31.5–35.7)
MCV RBC AUTO: 84 FL (ref 79–97)
PHOSPHATE SERPL-MCNC: 1.9 MG/DL (ref 2.5–4.5)
PLATELET # BLD AUTO: 178 10*3/MM3 (ref 140–450)
PMV BLD AUTO: 10.4 FL (ref 6–12)
POTASSIUM SERPL-SCNC: 4.5 MMOL/L (ref 3.5–5.2)
RBC # BLD AUTO: 3.56 10*6/MM3 (ref 3.77–5.28)
SODIUM SERPL-SCNC: 125 MMOL/L (ref 136–145)
WBC NRBC COR # BLD: 18.37 10*3/MM3 (ref 3.4–10.8)

## 2023-01-06 PROCEDURE — 82962 GLUCOSE BLOOD TEST: CPT

## 2023-01-06 PROCEDURE — 85027 COMPLETE CBC AUTOMATED: CPT | Performed by: INTERNAL MEDICINE

## 2023-01-06 PROCEDURE — 25010000002 EPOETIN ALFA-EPBX 10000 UNIT/ML SOLUTION: Performed by: INTERNAL MEDICINE

## 2023-01-06 PROCEDURE — 80069 RENAL FUNCTION PANEL: CPT | Performed by: INTERNAL MEDICINE

## 2023-01-06 PROCEDURE — 63710000001 INSULIN LISPRO (HUMAN) PER 5 UNITS: Performed by: INTERNAL MEDICINE

## 2023-01-06 PROCEDURE — 25010000002 HYDROMORPHONE PER 4 MG: Performed by: INTERNAL MEDICINE

## 2023-01-06 RX ADMIN — HYDROCODONE BITARTRATE AND ACETAMINOPHEN 1 TABLET: 5; 325 TABLET ORAL at 06:07

## 2023-01-06 RX ADMIN — INSULIN GLARGINE-YFGN 7 UNITS: 100 INJECTION, SOLUTION SUBCUTANEOUS at 20:10

## 2023-01-06 RX ADMIN — Medication 10 ML: at 20:09

## 2023-01-06 RX ADMIN — HYDROCODONE BITARTRATE AND ACETAMINOPHEN 1 TABLET: 5; 325 TABLET ORAL at 12:36

## 2023-01-06 RX ADMIN — PANTOPRAZOLE SODIUM 40 MG: 40 TABLET, DELAYED RELEASE ORAL at 12:36

## 2023-01-06 RX ADMIN — INSULIN LISPRO 3 UNITS: 100 INJECTION, SOLUTION INTRAVENOUS; SUBCUTANEOUS at 12:36

## 2023-01-06 RX ADMIN — Medication 10 ML: at 12:37

## 2023-01-06 RX ADMIN — HYDROMORPHONE HYDROCHLORIDE 0.5 MG: 1 INJECTION, SOLUTION INTRAMUSCULAR; INTRAVENOUS; SUBCUTANEOUS at 18:26

## 2023-01-06 RX ADMIN — CARVEDILOL 25 MG: 25 TABLET, FILM COATED ORAL at 12:36

## 2023-01-06 RX ADMIN — HYDROMORPHONE HYDROCHLORIDE 0.5 MG: 1 INJECTION, SOLUTION INTRAMUSCULAR; INTRAVENOUS; SUBCUTANEOUS at 14:20

## 2023-01-06 RX ADMIN — CARVEDILOL 25 MG: 25 TABLET, FILM COATED ORAL at 20:08

## 2023-01-06 RX ADMIN — AMLODIPINE BESYLATE 10 MG: 10 TABLET ORAL at 12:36

## 2023-01-06 RX ADMIN — LEVOTHYROXINE SODIUM 275 MCG: 0.17 TABLET ORAL at 06:07

## 2023-01-06 RX ADMIN — INSULIN LISPRO 5 UNITS: 100 INJECTION, SOLUTION INTRAVENOUS; SUBCUTANEOUS at 18:23

## 2023-01-06 RX ADMIN — HYDROMORPHONE HYDROCHLORIDE 0.5 MG: 1 INJECTION, SOLUTION INTRAMUSCULAR; INTRAVENOUS; SUBCUTANEOUS at 22:34

## 2023-01-06 RX ADMIN — EPOETIN ALFA-EPBX 10000 UNITS: 10000 INJECTION, SOLUTION INTRAVENOUS; SUBCUTANEOUS at 09:17

## 2023-01-06 RX ADMIN — FOLIC ACID 1 MG: 1 TABLET ORAL at 12:36

## 2023-01-06 RX ADMIN — LIDOCAINE 2 PATCH: 700 PATCH TOPICAL at 12:35

## 2023-01-07 LAB
ALBUMIN SERPL-MCNC: 2.5 G/DL (ref 3.5–5.2)
ANION GAP SERPL CALCULATED.3IONS-SCNC: 6.3 MMOL/L (ref 5–15)
BUN SERPL-MCNC: 40 MG/DL (ref 6–20)
BUN/CREAT SERPL: 16.1 (ref 7–25)
CALCIUM SPEC-SCNC: 7.5 MG/DL (ref 8.6–10.5)
CHLORIDE SERPL-SCNC: 93 MMOL/L (ref 98–107)
CO2 SERPL-SCNC: 25.7 MMOL/L (ref 22–29)
CREAT SERPL-MCNC: 2.48 MG/DL (ref 0.57–1)
DEPRECATED RDW RBC AUTO: 44.4 FL (ref 37–54)
EGFRCR SERPLBLD CKD-EPI 2021: 22.1 ML/MIN/1.73
ERYTHROCYTE [DISTWIDTH] IN BLOOD BY AUTOMATED COUNT: 14.3 % (ref 12.3–15.4)
GLUCOSE BLDC GLUCOMTR-MCNC: 224 MG/DL (ref 70–130)
GLUCOSE BLDC GLUCOMTR-MCNC: 262 MG/DL (ref 70–130)
GLUCOSE BLDC GLUCOMTR-MCNC: 263 MG/DL (ref 70–130)
GLUCOSE BLDC GLUCOMTR-MCNC: 277 MG/DL (ref 70–130)
GLUCOSE SERPL-MCNC: 214 MG/DL (ref 65–99)
HCT VFR BLD AUTO: 28.1 % (ref 34–46.6)
HGB BLD-MCNC: 9.1 G/DL (ref 12–15.9)
MCH RBC QN AUTO: 28.2 PG (ref 26.6–33)
MCHC RBC AUTO-ENTMCNC: 32.4 G/DL (ref 31.5–35.7)
MCV RBC AUTO: 87 FL (ref 79–97)
PHOSPHATE SERPL-MCNC: 1.4 MG/DL (ref 2.5–4.5)
PLATELET # BLD AUTO: 221 10*3/MM3 (ref 140–450)
PMV BLD AUTO: 11.1 FL (ref 6–12)
POTASSIUM SERPL-SCNC: 4.3 MMOL/L (ref 3.5–5.2)
RBC # BLD AUTO: 3.23 10*6/MM3 (ref 3.77–5.28)
SODIUM SERPL-SCNC: 125 MMOL/L (ref 136–145)
WBC NRBC COR # BLD: 17.67 10*3/MM3 (ref 3.4–10.8)

## 2023-01-07 PROCEDURE — 25010000002 HYDROMORPHONE PER 4 MG: Performed by: INTERNAL MEDICINE

## 2023-01-07 PROCEDURE — 25010000002 ONDANSETRON PER 1 MG: Performed by: INTERNAL MEDICINE

## 2023-01-07 PROCEDURE — 82962 GLUCOSE BLOOD TEST: CPT

## 2023-01-07 PROCEDURE — 85027 COMPLETE CBC AUTOMATED: CPT | Performed by: INTERNAL MEDICINE

## 2023-01-07 PROCEDURE — 63710000001 INSULIN LISPRO (HUMAN) PER 5 UNITS: Performed by: INTERNAL MEDICINE

## 2023-01-07 PROCEDURE — 80069 RENAL FUNCTION PANEL: CPT | Performed by: INTERNAL MEDICINE

## 2023-01-07 RX ORDER — HYDROXYZINE HYDROCHLORIDE 25 MG/1
25 TABLET, FILM COATED ORAL 3 TIMES DAILY PRN
Status: DISCONTINUED | OUTPATIENT
Start: 2023-01-07 | End: 2023-01-13

## 2023-01-07 RX ADMIN — HYDROMORPHONE HYDROCHLORIDE 0.5 MG: 1 INJECTION, SOLUTION INTRAMUSCULAR; INTRAVENOUS; SUBCUTANEOUS at 15:19

## 2023-01-07 RX ADMIN — HYDROMORPHONE HYDROCHLORIDE 0.5 MG: 1 INJECTION, SOLUTION INTRAMUSCULAR; INTRAVENOUS; SUBCUTANEOUS at 10:57

## 2023-01-07 RX ADMIN — PANTOPRAZOLE SODIUM 40 MG: 40 TABLET, DELAYED RELEASE ORAL at 08:28

## 2023-01-07 RX ADMIN — LEVOTHYROXINE SODIUM 275 MCG: 0.17 TABLET ORAL at 06:24

## 2023-01-07 RX ADMIN — HYDROMORPHONE HYDROCHLORIDE 0.5 MG: 1 INJECTION, SOLUTION INTRAMUSCULAR; INTRAVENOUS; SUBCUTANEOUS at 19:59

## 2023-01-07 RX ADMIN — INSULIN GLARGINE-YFGN 10 UNITS: 100 INJECTION, SOLUTION SUBCUTANEOUS at 21:24

## 2023-01-07 RX ADMIN — HYDROXYZINE HYDROCHLORIDE 25 MG: 25 TABLET ORAL at 21:24

## 2023-01-07 RX ADMIN — INSULIN LISPRO 4 UNITS: 100 INJECTION, SOLUTION INTRAVENOUS; SUBCUTANEOUS at 12:20

## 2023-01-07 RX ADMIN — Medication 10 ML: at 15:19

## 2023-01-07 RX ADMIN — AMLODIPINE BESYLATE 10 MG: 10 TABLET ORAL at 08:28

## 2023-01-07 RX ADMIN — Medication 10 ML: at 16:52

## 2023-01-07 RX ADMIN — INSULIN LISPRO 4 UNITS: 100 INJECTION, SOLUTION INTRAVENOUS; SUBCUTANEOUS at 17:50

## 2023-01-07 RX ADMIN — FOLIC ACID 1 MG: 1 TABLET ORAL at 08:28

## 2023-01-07 RX ADMIN — ONDANSETRON 4 MG: 2 INJECTION INTRAMUSCULAR; INTRAVENOUS at 16:52

## 2023-01-07 RX ADMIN — INSULIN LISPRO 3 UNITS: 100 INJECTION, SOLUTION INTRAVENOUS; SUBCUTANEOUS at 08:28

## 2023-01-07 RX ADMIN — HYDROMORPHONE HYDROCHLORIDE 0.5 MG: 1 INJECTION, SOLUTION INTRAMUSCULAR; INTRAVENOUS; SUBCUTANEOUS at 04:50

## 2023-01-07 RX ADMIN — CARVEDILOL 25 MG: 25 TABLET, FILM COATED ORAL at 08:28

## 2023-01-07 RX ADMIN — Medication 10 ML: at 20:04

## 2023-01-07 RX ADMIN — DIBASIC SODIUM PHOSPHATE, MONOBASIC POTASSIUM PHOSPHATE AND MONOBASIC SODIUM PHOSPHATE 2 TABLET: 852; 155; 130 TABLET ORAL at 13:45

## 2023-01-07 RX ADMIN — CARVEDILOL 25 MG: 25 TABLET, FILM COATED ORAL at 20:04

## 2023-01-07 RX ADMIN — Medication 10 ML: at 10:58

## 2023-01-07 RX ADMIN — DIBASIC SODIUM PHOSPHATE, MONOBASIC POTASSIUM PHOSPHATE AND MONOBASIC SODIUM PHOSPHATE 2 TABLET: 852; 155; 130 TABLET ORAL at 20:04

## 2023-01-08 LAB
ALBUMIN SERPL-MCNC: 2.4 G/DL (ref 3.5–5.2)
ANION GAP SERPL CALCULATED.3IONS-SCNC: 11 MMOL/L (ref 5–15)
BUN SERPL-MCNC: 52 MG/DL (ref 6–20)
BUN/CREAT SERPL: 16.4 (ref 7–25)
CALCIUM SPEC-SCNC: 7.3 MG/DL (ref 8.6–10.5)
CHLORIDE SERPL-SCNC: 92 MMOL/L (ref 98–107)
CO2 SERPL-SCNC: 23 MMOL/L (ref 22–29)
CREAT SERPL-MCNC: 3.18 MG/DL (ref 0.57–1)
DEPRECATED RDW RBC AUTO: 44.1 FL (ref 37–54)
EGFRCR SERPLBLD CKD-EPI 2021: 16.4 ML/MIN/1.73
ERYTHROCYTE [DISTWIDTH] IN BLOOD BY AUTOMATED COUNT: 14.2 % (ref 12.3–15.4)
GLUCOSE BLDC GLUCOMTR-MCNC: 244 MG/DL (ref 70–130)
GLUCOSE BLDC GLUCOMTR-MCNC: 264 MG/DL (ref 70–130)
GLUCOSE BLDC GLUCOMTR-MCNC: 293 MG/DL (ref 70–130)
GLUCOSE BLDC GLUCOMTR-MCNC: 347 MG/DL (ref 70–130)
GLUCOSE BLDC GLUCOMTR-MCNC: 371 MG/DL (ref 70–130)
GLUCOSE SERPL-MCNC: 236 MG/DL (ref 65–99)
HCT VFR BLD AUTO: 30.7 % (ref 34–46.6)
HGB BLD-MCNC: 10.1 G/DL (ref 12–15.9)
MCH RBC QN AUTO: 28 PG (ref 26.6–33)
MCHC RBC AUTO-ENTMCNC: 32.9 G/DL (ref 31.5–35.7)
MCV RBC AUTO: 85 FL (ref 79–97)
PHOSPHATE SERPL-MCNC: 2.4 MG/DL (ref 2.5–4.5)
PLATELET # BLD AUTO: 259 10*3/MM3 (ref 140–450)
PMV BLD AUTO: 10.6 FL (ref 6–12)
POTASSIUM SERPL-SCNC: 5.2 MMOL/L (ref 3.5–5.2)
RBC # BLD AUTO: 3.61 10*6/MM3 (ref 3.77–5.28)
SODIUM SERPL-SCNC: 126 MMOL/L (ref 136–145)
WBC NRBC COR # BLD: 15.54 10*3/MM3 (ref 3.4–10.8)

## 2023-01-08 PROCEDURE — 85027 COMPLETE CBC AUTOMATED: CPT | Performed by: INTERNAL MEDICINE

## 2023-01-08 PROCEDURE — 25010000002 HYDROMORPHONE PER 4 MG: Performed by: INTERNAL MEDICINE

## 2023-01-08 PROCEDURE — 82962 GLUCOSE BLOOD TEST: CPT

## 2023-01-08 PROCEDURE — 80069 RENAL FUNCTION PANEL: CPT | Performed by: INTERNAL MEDICINE

## 2023-01-08 PROCEDURE — 63710000001 INSULIN LISPRO (HUMAN) PER 5 UNITS: Performed by: INTERNAL MEDICINE

## 2023-01-08 RX ADMIN — LEVOTHYROXINE SODIUM 275 MCG: 0.17 TABLET ORAL at 06:55

## 2023-01-08 RX ADMIN — HYDROMORPHONE HYDROCHLORIDE 0.5 MG: 1 INJECTION, SOLUTION INTRAMUSCULAR; INTRAVENOUS; SUBCUTANEOUS at 18:43

## 2023-01-08 RX ADMIN — FOLIC ACID 1 MG: 1 TABLET ORAL at 08:30

## 2023-01-08 RX ADMIN — INSULIN GLARGINE-YFGN 12 UNITS: 100 INJECTION, SOLUTION SUBCUTANEOUS at 20:46

## 2023-01-08 RX ADMIN — PANTOPRAZOLE SODIUM 40 MG: 40 TABLET, DELAYED RELEASE ORAL at 08:30

## 2023-01-08 RX ADMIN — CARVEDILOL 25 MG: 25 TABLET, FILM COATED ORAL at 08:30

## 2023-01-08 RX ADMIN — HYDROMORPHONE HYDROCHLORIDE 0.5 MG: 1 INJECTION, SOLUTION INTRAMUSCULAR; INTRAVENOUS; SUBCUTANEOUS at 05:42

## 2023-01-08 RX ADMIN — HYDROMORPHONE HYDROCHLORIDE 0.5 MG: 1 INJECTION, SOLUTION INTRAMUSCULAR; INTRAVENOUS; SUBCUTANEOUS at 09:45

## 2023-01-08 RX ADMIN — AMLODIPINE BESYLATE 10 MG: 10 TABLET ORAL at 08:30

## 2023-01-08 RX ADMIN — Medication 10 ML: at 08:29

## 2023-01-08 RX ADMIN — HYDROXYZINE HYDROCHLORIDE 25 MG: 25 TABLET ORAL at 23:33

## 2023-01-08 RX ADMIN — HYDROMORPHONE HYDROCHLORIDE 0.5 MG: 1 INJECTION, SOLUTION INTRAMUSCULAR; INTRAVENOUS; SUBCUTANEOUS at 14:31

## 2023-01-08 RX ADMIN — HYDROMORPHONE HYDROCHLORIDE 0.5 MG: 1 INJECTION, SOLUTION INTRAMUSCULAR; INTRAVENOUS; SUBCUTANEOUS at 21:45

## 2023-01-08 RX ADMIN — INSULIN LISPRO 4 UNITS: 100 INJECTION, SOLUTION INTRAVENOUS; SUBCUTANEOUS at 12:13

## 2023-01-08 RX ADMIN — INSULIN LISPRO 3 UNITS: 100 INJECTION, SOLUTION INTRAVENOUS; SUBCUTANEOUS at 08:30

## 2023-01-08 RX ADMIN — CARVEDILOL 25 MG: 25 TABLET, FILM COATED ORAL at 20:46

## 2023-01-08 RX ADMIN — Medication 10 ML: at 20:47

## 2023-01-08 RX ADMIN — INSULIN LISPRO 4 UNITS: 100 INJECTION, SOLUTION INTRAVENOUS; SUBCUTANEOUS at 17:27

## 2023-01-09 LAB
ALBUMIN SERPL-MCNC: 2.2 G/DL (ref 3.5–5.2)
ANION GAP SERPL CALCULATED.3IONS-SCNC: 9.2 MMOL/L (ref 5–15)
BUN SERPL-MCNC: 61 MG/DL (ref 6–20)
BUN/CREAT SERPL: 16.1 (ref 7–25)
CALCIUM SPEC-SCNC: 6.9 MG/DL (ref 8.6–10.5)
CHLORIDE SERPL-SCNC: 93 MMOL/L (ref 98–107)
CO2 SERPL-SCNC: 19.8 MMOL/L (ref 22–29)
CREAT SERPL-MCNC: 3.79 MG/DL (ref 0.57–1)
DEPRECATED RDW RBC AUTO: 47.6 FL (ref 37–54)
EGFRCR SERPLBLD CKD-EPI 2021: 13.3 ML/MIN/1.73
ERYTHROCYTE [DISTWIDTH] IN BLOOD BY AUTOMATED COUNT: 14.2 % (ref 12.3–15.4)
GLUCOSE BLDC GLUCOMTR-MCNC: 236 MG/DL (ref 70–130)
GLUCOSE BLDC GLUCOMTR-MCNC: 299 MG/DL (ref 70–130)
GLUCOSE BLDC GLUCOMTR-MCNC: 322 MG/DL (ref 70–130)
GLUCOSE BLDC GLUCOMTR-MCNC: 97 MG/DL (ref 70–130)
GLUCOSE SERPL-MCNC: 375 MG/DL (ref 65–99)
HCT VFR BLD AUTO: 27.8 % (ref 34–46.6)
HGB BLD-MCNC: 8.6 G/DL (ref 12–15.9)
MCH RBC QN AUTO: 28.2 PG (ref 26.6–33)
MCHC RBC AUTO-ENTMCNC: 30.9 G/DL (ref 31.5–35.7)
MCV RBC AUTO: 91.1 FL (ref 79–97)
PHOSPHATE SERPL-MCNC: 2.5 MG/DL (ref 2.5–4.5)
PLATELET # BLD AUTO: 218 10*3/MM3 (ref 140–450)
PMV BLD AUTO: 10.5 FL (ref 6–12)
POTASSIUM SERPL-SCNC: 5.8 MMOL/L (ref 3.5–5.2)
RBC # BLD AUTO: 3.05 10*6/MM3 (ref 3.77–5.28)
SODIUM SERPL-SCNC: 122 MMOL/L (ref 136–145)
WBC NRBC COR # BLD: 14.53 10*3/MM3 (ref 3.4–10.8)

## 2023-01-09 PROCEDURE — 25010000002 EPOETIN ALFA-EPBX 10000 UNIT/ML SOLUTION: Performed by: INTERNAL MEDICINE

## 2023-01-09 PROCEDURE — 85027 COMPLETE CBC AUTOMATED: CPT | Performed by: INTERNAL MEDICINE

## 2023-01-09 PROCEDURE — 82962 GLUCOSE BLOOD TEST: CPT

## 2023-01-09 PROCEDURE — 80069 RENAL FUNCTION PANEL: CPT | Performed by: INTERNAL MEDICINE

## 2023-01-09 PROCEDURE — 25010000002 HYDROMORPHONE PER 4 MG: Performed by: INTERNAL MEDICINE

## 2023-01-09 PROCEDURE — 25010000002 HEPARIN (PORCINE) PER 1000 UNITS: Performed by: INTERNAL MEDICINE

## 2023-01-09 PROCEDURE — 63710000001 INSULIN LISPRO (HUMAN) PER 5 UNITS: Performed by: INTERNAL MEDICINE

## 2023-01-09 RX ORDER — AMOXICILLIN 250 MG
2 CAPSULE ORAL 2 TIMES DAILY
Status: DISCONTINUED | OUTPATIENT
Start: 2023-01-09 | End: 2023-01-19

## 2023-01-09 RX ORDER — BISACODYL 5 MG/1
5 TABLET, DELAYED RELEASE ORAL DAILY PRN
Status: DISCONTINUED | OUTPATIENT
Start: 2023-01-09 | End: 2023-01-29 | Stop reason: HOSPADM

## 2023-01-09 RX ORDER — BISACODYL 10 MG
10 SUPPOSITORY, RECTAL RECTAL DAILY PRN
Status: DISCONTINUED | OUTPATIENT
Start: 2023-01-09 | End: 2023-01-29 | Stop reason: HOSPADM

## 2023-01-09 RX ORDER — POLYETHYLENE GLYCOL 3350 17 G/17G
17 POWDER, FOR SOLUTION ORAL DAILY PRN
Status: DISCONTINUED | OUTPATIENT
Start: 2023-01-09 | End: 2023-01-29 | Stop reason: HOSPADM

## 2023-01-09 RX ADMIN — DOCUSATE SODIUM 50MG AND SENNOSIDES 8.6MG 2 TABLET: 8.6; 5 TABLET, FILM COATED ORAL at 20:22

## 2023-01-09 RX ADMIN — LEVOTHYROXINE SODIUM 275 MCG: 0.17 TABLET ORAL at 06:29

## 2023-01-09 RX ADMIN — HYDROMORPHONE HYDROCHLORIDE 0.5 MG: 1 INJECTION, SOLUTION INTRAMUSCULAR; INTRAVENOUS; SUBCUTANEOUS at 18:28

## 2023-01-09 RX ADMIN — EPOETIN ALFA-EPBX 10000 UNITS: 10000 INJECTION, SOLUTION INTRAVENOUS; SUBCUTANEOUS at 11:41

## 2023-01-09 RX ADMIN — DOCUSATE SODIUM 50MG AND SENNOSIDES 8.6MG 2 TABLET: 8.6; 5 TABLET, FILM COATED ORAL at 13:56

## 2023-01-09 RX ADMIN — INSULIN LISPRO 3 UNITS: 100 INJECTION, SOLUTION INTRAVENOUS; SUBCUTANEOUS at 17:54

## 2023-01-09 RX ADMIN — CARVEDILOL 25 MG: 25 TABLET, FILM COATED ORAL at 20:21

## 2023-01-09 RX ADMIN — INSULIN LISPRO 5 UNITS: 100 INJECTION, SOLUTION INTRAVENOUS; SUBCUTANEOUS at 08:06

## 2023-01-09 RX ADMIN — Medication 10 ML: at 20:22

## 2023-01-09 RX ADMIN — HEPARIN SODIUM 4000 UNITS: 1000 INJECTION INTRAVENOUS; SUBCUTANEOUS at 12:29

## 2023-01-09 RX ADMIN — INSULIN GLARGINE-YFGN 15 UNITS: 100 INJECTION, SOLUTION SUBCUTANEOUS at 20:22

## 2023-01-09 RX ADMIN — HYDROMORPHONE HYDROCHLORIDE 0.5 MG: 1 INJECTION, SOLUTION INTRAMUSCULAR; INTRAVENOUS; SUBCUTANEOUS at 08:16

## 2023-01-09 RX ADMIN — HYDROMORPHONE HYDROCHLORIDE 0.5 MG: 1 INJECTION, SOLUTION INTRAMUSCULAR; INTRAVENOUS; SUBCUTANEOUS at 01:14

## 2023-01-09 RX ADMIN — Medication 10 ML: at 08:05

## 2023-01-09 RX ADMIN — CARVEDILOL 25 MG: 25 TABLET, FILM COATED ORAL at 13:54

## 2023-01-09 RX ADMIN — FOLIC ACID 1 MG: 1 TABLET ORAL at 13:55

## 2023-01-09 RX ADMIN — HYDROMORPHONE HYDROCHLORIDE 0.5 MG: 1 INJECTION, SOLUTION INTRAMUSCULAR; INTRAVENOUS; SUBCUTANEOUS at 14:01

## 2023-01-09 RX ADMIN — AMLODIPINE BESYLATE 10 MG: 10 TABLET ORAL at 13:54

## 2023-01-09 RX ADMIN — HYDROMORPHONE HYDROCHLORIDE 0.5 MG: 1 INJECTION, SOLUTION INTRAMUSCULAR; INTRAVENOUS; SUBCUTANEOUS at 04:30

## 2023-01-09 RX ADMIN — PANTOPRAZOLE SODIUM 40 MG: 40 TABLET, DELAYED RELEASE ORAL at 13:55

## 2023-01-09 RX ADMIN — HYDROMORPHONE HYDROCHLORIDE 0.5 MG: 1 INJECTION, SOLUTION INTRAMUSCULAR; INTRAVENOUS; SUBCUTANEOUS at 22:07

## 2023-01-10 ENCOUNTER — APPOINTMENT (OUTPATIENT)
Dept: GENERAL RADIOLOGY | Facility: HOSPITAL | Age: 58
DRG: 270 | End: 2023-01-10
Payer: MEDICARE

## 2023-01-10 ENCOUNTER — ANESTHESIA EVENT (OUTPATIENT)
Dept: PERIOP | Facility: HOSPITAL | Age: 58
DRG: 270 | End: 2023-01-10
Payer: MEDICARE

## 2023-01-10 ENCOUNTER — ANESTHESIA (OUTPATIENT)
Dept: PERIOP | Facility: HOSPITAL | Age: 58
DRG: 270 | End: 2023-01-10
Payer: MEDICARE

## 2023-01-10 LAB
ALBUMIN SERPL-MCNC: 2.4 G/DL (ref 3.5–5.2)
ANION GAP SERPL CALCULATED.3IONS-SCNC: 10.9 MMOL/L (ref 5–15)
BASOPHILS # BLD AUTO: 0.1 10*3/MM3 (ref 0–0.2)
BASOPHILS NFR BLD AUTO: 0.5 % (ref 0–1.5)
BUN SERPL-MCNC: 47 MG/DL (ref 6–20)
BUN/CREAT SERPL: 15.6 (ref 7–25)
CALCIUM SPEC-SCNC: 7.7 MG/DL (ref 8.6–10.5)
CHLORIDE SERPL-SCNC: 95 MMOL/L (ref 98–107)
CO2 SERPL-SCNC: 23.1 MMOL/L (ref 22–29)
CREAT SERPL-MCNC: 3.02 MG/DL (ref 0.57–1)
DEPRECATED RDW RBC AUTO: 45.6 FL (ref 37–54)
EGFRCR SERPLBLD CKD-EPI 2021: 17.5 ML/MIN/1.73
EOSINOPHIL # BLD AUTO: 0.14 10*3/MM3 (ref 0–0.4)
EOSINOPHIL NFR BLD AUTO: 0.7 % (ref 0.3–6.2)
ERYTHROCYTE [DISTWIDTH] IN BLOOD BY AUTOMATED COUNT: 14.3 % (ref 12.3–15.4)
GLUCOSE BLDC GLUCOMTR-MCNC: 130 MG/DL (ref 70–130)
GLUCOSE BLDC GLUCOMTR-MCNC: 130 MG/DL (ref 70–130)
GLUCOSE BLDC GLUCOMTR-MCNC: 148 MG/DL (ref 70–130)
GLUCOSE BLDC GLUCOMTR-MCNC: 164 MG/DL (ref 70–130)
GLUCOSE BLDC GLUCOMTR-MCNC: 168 MG/DL (ref 70–130)
GLUCOSE BLDC GLUCOMTR-MCNC: 315 MG/DL (ref 70–130)
GLUCOSE SERPL-MCNC: 185 MG/DL (ref 65–99)
HCT VFR BLD AUTO: 30.6 % (ref 34–46.6)
HGB BLD-MCNC: 9.7 G/DL (ref 12–15.9)
IMM GRANULOCYTES # BLD AUTO: 0.92 10*3/MM3 (ref 0–0.05)
IMM GRANULOCYTES NFR BLD AUTO: 4.7 % (ref 0–0.5)
LYMPHOCYTES # BLD AUTO: 1.57 10*3/MM3 (ref 0.7–3.1)
LYMPHOCYTES NFR BLD AUTO: 8 % (ref 19.6–45.3)
MCH RBC QN AUTO: 27.9 PG (ref 26.6–33)
MCHC RBC AUTO-ENTMCNC: 31.7 G/DL (ref 31.5–35.7)
MCV RBC AUTO: 87.9 FL (ref 79–97)
MONOCYTES # BLD AUTO: 2.59 10*3/MM3 (ref 0.1–0.9)
MONOCYTES NFR BLD AUTO: 13.3 % (ref 5–12)
NEUTROPHILS NFR BLD AUTO: 14.19 10*3/MM3 (ref 1.7–7)
NEUTROPHILS NFR BLD AUTO: 72.8 % (ref 42.7–76)
NRBC BLD AUTO-RTO: 0 /100 WBC (ref 0–0.2)
PHOSPHATE SERPL-MCNC: 2.4 MG/DL (ref 2.5–4.5)
PLATELET # BLD AUTO: 314 10*3/MM3 (ref 140–450)
PMV BLD AUTO: 10.6 FL (ref 6–12)
POTASSIUM SERPL-SCNC: 5.3 MMOL/L (ref 3.5–5.2)
RBC # BLD AUTO: 3.48 10*6/MM3 (ref 3.77–5.28)
SODIUM SERPL-SCNC: 129 MMOL/L (ref 136–145)
WBC NRBC COR # BLD: 19.51 10*3/MM3 (ref 3.4–10.8)

## 2023-01-10 PROCEDURE — 25010000002 HYDROMORPHONE PER 4 MG: Performed by: UROLOGY

## 2023-01-10 PROCEDURE — 97530 THERAPEUTIC ACTIVITIES: CPT

## 2023-01-10 PROCEDURE — 97535 SELF CARE MNGMENT TRAINING: CPT

## 2023-01-10 PROCEDURE — 80069 RENAL FUNCTION PANEL: CPT | Performed by: INTERNAL MEDICINE

## 2023-01-10 PROCEDURE — 97116 GAIT TRAINING THERAPY: CPT

## 2023-01-10 PROCEDURE — 82962 GLUCOSE BLOOD TEST: CPT

## 2023-01-10 PROCEDURE — 25010000002 CEFAZOLIN IN DEXTROSE 2-4 GM/100ML-% SOLUTION: Performed by: UROLOGY

## 2023-01-10 PROCEDURE — 0TP98DZ REMOVAL OF INTRALUMINAL DEVICE FROM URETER, VIA NATURAL OR ARTIFICIAL OPENING ENDOSCOPIC: ICD-10-PCS | Performed by: UROLOGY

## 2023-01-10 PROCEDURE — 25010000002 PROPOFOL 10 MG/ML EMULSION: Performed by: NURSE ANESTHETIST, CERTIFIED REGISTERED

## 2023-01-10 PROCEDURE — 25010000002 ONDANSETRON PER 1 MG: Performed by: UROLOGY

## 2023-01-10 PROCEDURE — 25010000002 HYDROMORPHONE PER 4 MG: Performed by: INTERNAL MEDICINE

## 2023-01-10 PROCEDURE — 25010000002 FENTANYL CITRATE (PF) 50 MCG/ML SOLUTION: Performed by: NURSE ANESTHETIST, CERTIFIED REGISTERED

## 2023-01-10 PROCEDURE — 85025 COMPLETE CBC W/AUTO DIFF WBC: CPT | Performed by: INTERNAL MEDICINE

## 2023-01-10 RX ORDER — SODIUM CHLORIDE 0.9 % (FLUSH) 0.9 %
3-10 SYRINGE (ML) INJECTION AS NEEDED
Status: DISCONTINUED | OUTPATIENT
Start: 2023-01-10 | End: 2023-01-29 | Stop reason: HOSPADM

## 2023-01-10 RX ORDER — SODIUM CHLORIDE, SODIUM LACTATE, POTASSIUM CHLORIDE, CALCIUM CHLORIDE 600; 310; 30; 20 MG/100ML; MG/100ML; MG/100ML; MG/100ML
9 INJECTION, SOLUTION INTRAVENOUS CONTINUOUS
Status: DISCONTINUED | OUTPATIENT
Start: 2023-01-10 | End: 2023-01-10

## 2023-01-10 RX ORDER — HYDROMORPHONE HYDROCHLORIDE 1 MG/ML
0.2 INJECTION, SOLUTION INTRAMUSCULAR; INTRAVENOUS; SUBCUTANEOUS
Status: DISCONTINUED | OUTPATIENT
Start: 2023-01-10 | End: 2023-01-11

## 2023-01-10 RX ORDER — LABETALOL HYDROCHLORIDE 5 MG/ML
5 INJECTION, SOLUTION INTRAVENOUS
Status: DISCONTINUED | OUTPATIENT
Start: 2023-01-10 | End: 2023-01-15

## 2023-01-10 RX ORDER — CEFAZOLIN SODIUM 2 G/100ML
2 INJECTION, SOLUTION INTRAVENOUS ONCE
Status: COMPLETED | OUTPATIENT
Start: 2023-01-10 | End: 2023-01-10

## 2023-01-10 RX ORDER — SODIUM CHLORIDE 9 MG/ML
9 INJECTION, SOLUTION INTRAVENOUS CONTINUOUS
Status: DISCONTINUED | OUTPATIENT
Start: 2023-01-10 | End: 2023-01-10

## 2023-01-10 RX ORDER — FAMOTIDINE 10 MG/ML
20 INJECTION, SOLUTION INTRAVENOUS ONCE
Status: COMPLETED | OUTPATIENT
Start: 2023-01-10 | End: 2023-01-10

## 2023-01-10 RX ORDER — LIDOCAINE HYDROCHLORIDE 10 MG/ML
0.5 INJECTION, SOLUTION EPIDURAL; INFILTRATION; INTRACAUDAL; PERINEURAL ONCE AS NEEDED
Status: DISCONTINUED | OUTPATIENT
Start: 2023-01-10 | End: 2023-01-29 | Stop reason: HOSPADM

## 2023-01-10 RX ORDER — SODIUM CHLORIDE 0.9 % (FLUSH) 0.9 %
3 SYRINGE (ML) INJECTION EVERY 12 HOURS SCHEDULED
Status: DISCONTINUED | OUTPATIENT
Start: 2023-01-10 | End: 2023-01-29 | Stop reason: HOSPADM

## 2023-01-10 RX ORDER — FENTANYL CITRATE 50 UG/ML
25 INJECTION, SOLUTION INTRAMUSCULAR; INTRAVENOUS
Status: COMPLETED | OUTPATIENT
Start: 2023-01-10 | End: 2023-01-15

## 2023-01-10 RX ORDER — ONDANSETRON 2 MG/ML
4 INJECTION INTRAMUSCULAR; INTRAVENOUS ONCE AS NEEDED
Status: DISCONTINUED | OUTPATIENT
Start: 2023-01-10 | End: 2023-01-29 | Stop reason: HOSPADM

## 2023-01-10 RX ORDER — FENTANYL CITRATE 50 UG/ML
INJECTION, SOLUTION INTRAMUSCULAR; INTRAVENOUS AS NEEDED
Status: DISCONTINUED | OUTPATIENT
Start: 2023-01-10 | End: 2023-01-10 | Stop reason: SURG

## 2023-01-10 RX ORDER — MAGNESIUM HYDROXIDE 1200 MG/15ML
LIQUID ORAL AS NEEDED
Status: DISCONTINUED | OUTPATIENT
Start: 2023-01-10 | End: 2023-01-10 | Stop reason: HOSPADM

## 2023-01-10 RX ORDER — HYDRALAZINE HYDROCHLORIDE 20 MG/ML
5 INJECTION INTRAMUSCULAR; INTRAVENOUS
Status: DISCONTINUED | OUTPATIENT
Start: 2023-01-10 | End: 2023-01-29 | Stop reason: HOSPADM

## 2023-01-10 RX ADMIN — Medication 3 ML: at 11:18

## 2023-01-10 RX ADMIN — CEFAZOLIN SODIUM 2 G: 2 INJECTION, SOLUTION INTRAVENOUS at 08:33

## 2023-01-10 RX ADMIN — HYDROMORPHONE HYDROCHLORIDE 0.5 MG: 1 INJECTION, SOLUTION INTRAMUSCULAR; INTRAVENOUS; SUBCUTANEOUS at 11:19

## 2023-01-10 RX ADMIN — FENTANYL CITRATE 25 MCG: 50 INJECTION, SOLUTION INTRAMUSCULAR; INTRAVENOUS at 08:45

## 2023-01-10 RX ADMIN — INSULIN GLARGINE-YFGN 15 UNITS: 100 INJECTION, SOLUTION SUBCUTANEOUS at 20:19

## 2023-01-10 RX ADMIN — AMLODIPINE BESYLATE 10 MG: 10 TABLET ORAL at 11:17

## 2023-01-10 RX ADMIN — Medication 10 ML: at 20:19

## 2023-01-10 RX ADMIN — HYDROMORPHONE HYDROCHLORIDE 0.5 MG: 1 INJECTION, SOLUTION INTRAMUSCULAR; INTRAVENOUS; SUBCUTANEOUS at 05:21

## 2023-01-10 RX ADMIN — FENTANYL CITRATE 25 MCG: 50 INJECTION, SOLUTION INTRAMUSCULAR; INTRAVENOUS at 08:48

## 2023-01-10 RX ADMIN — DOCUSATE SODIUM 50MG AND SENNOSIDES 8.6MG 2 TABLET: 8.6; 5 TABLET, FILM COATED ORAL at 20:19

## 2023-01-10 RX ADMIN — CARVEDILOL 25 MG: 25 TABLET, FILM COATED ORAL at 20:19

## 2023-01-10 RX ADMIN — SODIUM CHLORIDE 9 ML/HR: 9 INJECTION, SOLUTION INTRAVENOUS at 08:12

## 2023-01-10 RX ADMIN — PROPOFOL 100 MCG/KG/MIN: 10 INJECTION, EMULSION INTRAVENOUS at 08:49

## 2023-01-10 RX ADMIN — LEVOTHYROXINE SODIUM 275 MCG: 0.17 TABLET ORAL at 05:21

## 2023-01-10 RX ADMIN — PROPOFOL 100 MCG/KG/MIN: 10 INJECTION, EMULSION INTRAVENOUS at 08:50

## 2023-01-10 RX ADMIN — HYDROMORPHONE HYDROCHLORIDE 0.5 MG: 1 INJECTION, SOLUTION INTRAMUSCULAR; INTRAVENOUS; SUBCUTANEOUS at 14:53

## 2023-01-10 RX ADMIN — FAMOTIDINE 20 MG: 10 INJECTION INTRAVENOUS at 08:12

## 2023-01-10 RX ADMIN — CARVEDILOL 25 MG: 25 TABLET, FILM COATED ORAL at 06:22

## 2023-01-10 RX ADMIN — HYDROMORPHONE HYDROCHLORIDE 0.5 MG: 1 INJECTION, SOLUTION INTRAMUSCULAR; INTRAVENOUS; SUBCUTANEOUS at 20:19

## 2023-01-10 RX ADMIN — ONDANSETRON 4 MG: 2 INJECTION INTRAMUSCULAR; INTRAVENOUS at 14:18

## 2023-01-10 NOTE — ANESTHESIA PREPROCEDURE EVALUATION
Anesthesia Evaluation     Patient summary reviewed and Nursing notes reviewed   no history of anesthetic complications:  NPO Solid Status: > 8 hours  NPO Liquid Status: > 2 hours           Airway   Mallampati: II  TM distance: >3 FB  Neck ROM: full  no difficulty expected  Dental - normal exam     Pulmonary    (+) pneumonia stable , pleural effusion, shortness of breath, recent URI, decreased breath sounds,   Cardiovascular - normal exam  Exercise tolerance: poor (<4 METS)    ECG reviewed  PT is on anticoagulation therapy  Rhythm: regular  Rate: normal    (+) hypertension well controlled 2 medications or greater, valvular problems/murmurs TI and MR, past MI  >12 months, CAD, dysrhythmias Atrial Flutter, CHF Systolic <55%, pericardial effusion, hyperlipidemia,     ROS comment: TTE 11/2022:  •  Left ventricular systolic function is low normal. Left ventricular ejection fraction appears to be 51 - 55%.  •  Left ventricular diastolic function is consistent with (grade II w/high LAP) pseudonormalization.  •  Mildly reduced right ventricular systolic function noted.  •  The right ventricular cavity is mild to moderately dilated.  •  The right atrial cavity is moderately  dilated.  •  Moderate mitral valve regurgitation is present.  •  Severe tricuspid valve regurgitation is present.  •  Estimated right ventricular systolic pressure from tricuspid regurgitation is mildly elevated (35-45 mmHg).  •  There is a large left and right pleural effusion.      Neuro/Psych  (+) CVA residual symptoms,    (-) seizures, TIA    ROS Comment: Hx/o CVA 05/2022  GI/Hepatic/Renal/Endo    (+)  GERD,  renal disease ESRD and dialysis, diabetes mellitus type 2 poorly controlled using insulin, thyroid problem hypothyroidism  (-) PUD, hepatitis, liver disease, GI bleed    Musculoskeletal     Abdominal  - normal exam   Substance History - negative use     OB/GYN          Other   arthritis, blood dyscrasia anemia,                     Anesthesia  Plan    ASA 4     MAC     intravenous induction     Anesthetic plan, risks, benefits, and alternatives have been provided, discussed and informed consent has been obtained with: patient.    Plan discussed with CRNA.        CODE STATUS:    Code Status (Patient has no pulse and is not breathing): CPR (Attempt to Resuscitate)  Medical Interventions (Patient has pulse or is breathing): Full Support

## 2023-01-10 NOTE — ANESTHESIA POSTPROCEDURE EVALUATION
Patient: Zaina Martinez    Procedure Summary     Date: 01/10/23 Room / Location: [facility] OR 01 / [facility] MAIN OR    Anesthesia Start: 0837 Anesthesia Stop: 0921    Procedure: CYSTOSCOPY LEFT STENT REMOVAL (Left) Diagnosis:     Surgeons: Bryant Phan Jr., MD Provider:     Anesthesia Type: general ASA Status: 4          Anesthesia Type: general    Vitals  Vitals Value Taken Time   /65 01/10/23 1001   Temp 37.1 °C (98.7 °F) 01/10/23 0918   Pulse 62 01/10/23 1015   Resp 16 01/10/23 1000   SpO2 95 % 01/10/23 1015   Vitals shown include unvalidated device data.        Post Anesthesia Care and Evaluation    Patient location during evaluation: bedside  Patient participation: complete - patient participated  Level of consciousness: awake and alert  Pain score: 0  Pain management: adequate    Airway patency: patent  Anesthetic complications: No anesthetic complications    Cardiovascular status: acceptable  Respiratory status: acceptable  Hydration status: acceptable    Comments: /65   Pulse 64   Temp 37.1 °C (98.7 °F) (Oral)   Resp 16   Ht 157.5 cm (62.01\")   Wt 73.2 kg (161 lb 6 oz)   SpO2 99%   BMI 29.51 kg/m²

## 2023-01-10 NOTE — ADDENDUM NOTE
Addendum  created 01/10/23 1019 by Jacob Calderon MD    Attestation recorded in Intraprocedure, Intraprocedure Attestations filed

## 2023-01-11 LAB
ALBUMIN SERPL-MCNC: 2.7 G/DL (ref 3.5–5.2)
ANION GAP SERPL CALCULATED.3IONS-SCNC: 11 MMOL/L (ref 5–15)
BUN SERPL-MCNC: 58 MG/DL (ref 6–20)
BUN/CREAT SERPL: 15.5 (ref 7–25)
CALCIUM SPEC-SCNC: 8 MG/DL (ref 8.6–10.5)
CHLORIDE SERPL-SCNC: 93 MMOL/L (ref 98–107)
CO2 SERPL-SCNC: 22 MMOL/L (ref 22–29)
CREAT SERPL-MCNC: 3.75 MG/DL (ref 0.57–1)
DEPRECATED RDW RBC AUTO: 46.7 FL (ref 37–54)
EGFRCR SERPLBLD CKD-EPI 2021: 13.5 ML/MIN/1.73
ERYTHROCYTE [DISTWIDTH] IN BLOOD BY AUTOMATED COUNT: 14.4 % (ref 12.3–15.4)
GLUCOSE BLDC GLUCOMTR-MCNC: 100 MG/DL (ref 70–130)
GLUCOSE BLDC GLUCOMTR-MCNC: 142 MG/DL (ref 70–130)
GLUCOSE BLDC GLUCOMTR-MCNC: 177 MG/DL (ref 70–130)
GLUCOSE BLDC GLUCOMTR-MCNC: 180 MG/DL (ref 70–130)
GLUCOSE BLDC GLUCOMTR-MCNC: 66 MG/DL (ref 70–130)
GLUCOSE SERPL-MCNC: 184 MG/DL (ref 65–99)
HCT VFR BLD AUTO: 31.1 % (ref 34–46.6)
HGB BLD-MCNC: 9.6 G/DL (ref 12–15.9)
MCH RBC QN AUTO: 27.9 PG (ref 26.6–33)
MCHC RBC AUTO-ENTMCNC: 30.9 G/DL (ref 31.5–35.7)
MCV RBC AUTO: 90.4 FL (ref 79–97)
PHOSPHATE SERPL-MCNC: 3.7 MG/DL (ref 2.5–4.5)
PLATELET # BLD AUTO: 355 10*3/MM3 (ref 140–450)
PMV BLD AUTO: 10.3 FL (ref 6–12)
POTASSIUM SERPL-SCNC: 6.3 MMOL/L (ref 3.5–5.2)
RBC # BLD AUTO: 3.44 10*6/MM3 (ref 3.77–5.28)
SODIUM SERPL-SCNC: 126 MMOL/L (ref 136–145)
WBC NRBC COR # BLD: 18.21 10*3/MM3 (ref 3.4–10.8)

## 2023-01-11 PROCEDURE — 85027 COMPLETE CBC AUTOMATED: CPT | Performed by: UROLOGY

## 2023-01-11 PROCEDURE — 25010000002 EPOETIN ALFA-EPBX 10000 UNIT/ML SOLUTION: Performed by: UROLOGY

## 2023-01-11 PROCEDURE — 82962 GLUCOSE BLOOD TEST: CPT

## 2023-01-11 PROCEDURE — 25010000002 ONDANSETRON PER 1 MG: Performed by: UROLOGY

## 2023-01-11 PROCEDURE — 25010000002 HYDROMORPHONE PER 4 MG: Performed by: INTERNAL MEDICINE

## 2023-01-11 PROCEDURE — 25010000002 HYDROMORPHONE PER 4 MG: Performed by: UROLOGY

## 2023-01-11 PROCEDURE — 80069 RENAL FUNCTION PANEL: CPT | Performed by: UROLOGY

## 2023-01-11 RX ORDER — OXYCODONE AND ACETAMINOPHEN 7.5; 325 MG/1; MG/1
1 TABLET ORAL EVERY 4 HOURS PRN
Status: DISCONTINUED | OUTPATIENT
Start: 2023-01-11 | End: 2023-01-23

## 2023-01-11 RX ORDER — HYDROMORPHONE HYDROCHLORIDE 1 MG/ML
0.25 INJECTION, SOLUTION INTRAMUSCULAR; INTRAVENOUS; SUBCUTANEOUS EVERY 4 HOURS PRN
Status: DISCONTINUED | OUTPATIENT
Start: 2023-01-11 | End: 2023-01-13

## 2023-01-11 RX ADMIN — HYDROMORPHONE HYDROCHLORIDE 0.5 MG: 1 INJECTION, SOLUTION INTRAMUSCULAR; INTRAVENOUS; SUBCUTANEOUS at 05:24

## 2023-01-11 RX ADMIN — HYDROMORPHONE HYDROCHLORIDE 0.5 MG: 1 INJECTION, SOLUTION INTRAMUSCULAR; INTRAVENOUS; SUBCUTANEOUS at 02:01

## 2023-01-11 RX ADMIN — HYDROMORPHONE HYDROCHLORIDE 0.25 MG: 1 INJECTION, SOLUTION INTRAMUSCULAR; INTRAVENOUS; SUBCUTANEOUS at 22:24

## 2023-01-11 RX ADMIN — EPOETIN ALFA-EPBX 10000 UNITS: 10000 INJECTION, SOLUTION INTRAVENOUS; SUBCUTANEOUS at 09:00

## 2023-01-11 RX ADMIN — ONDANSETRON 4 MG: 2 INJECTION INTRAMUSCULAR; INTRAVENOUS at 17:52

## 2023-01-11 RX ADMIN — LEVOTHYROXINE SODIUM 275 MCG: 0.17 TABLET ORAL at 05:51

## 2023-01-11 RX ADMIN — DOCUSATE SODIUM 50MG AND SENNOSIDES 8.6MG 2 TABLET: 8.6; 5 TABLET, FILM COATED ORAL at 13:24

## 2023-01-11 RX ADMIN — HYDROCODONE BITARTRATE AND ACETAMINOPHEN 1 TABLET: 5; 325 TABLET ORAL at 13:24

## 2023-01-11 RX ADMIN — ONDANSETRON 4 MG: 2 INJECTION INTRAMUSCULAR; INTRAVENOUS at 08:00

## 2023-01-11 RX ADMIN — HYDROMORPHONE HYDROCHLORIDE 0.25 MG: 1 INJECTION, SOLUTION INTRAMUSCULAR; INTRAVENOUS; SUBCUTANEOUS at 16:09

## 2023-01-11 RX ADMIN — AMLODIPINE BESYLATE 10 MG: 10 TABLET ORAL at 13:18

## 2023-01-11 RX ADMIN — FOLIC ACID 1 MG: 1 TABLET ORAL at 13:17

## 2023-01-11 RX ADMIN — INSULIN GLARGINE-YFGN 15 UNITS: 100 INJECTION, SOLUTION SUBCUTANEOUS at 20:20

## 2023-01-11 RX ADMIN — Medication 10 ML: at 20:20

## 2023-01-11 RX ADMIN — PANTOPRAZOLE SODIUM 40 MG: 40 TABLET, DELAYED RELEASE ORAL at 13:17

## 2023-01-11 RX ADMIN — DOCUSATE SODIUM 50MG AND SENNOSIDES 8.6MG 2 TABLET: 8.6; 5 TABLET, FILM COATED ORAL at 20:19

## 2023-01-11 RX ADMIN — OXYCODONE HYDROCHLORIDE AND ACETAMINOPHEN 1 TABLET: 7.5; 325 TABLET ORAL at 20:20

## 2023-01-11 RX ADMIN — Medication 10 ML: at 13:18

## 2023-01-11 RX ADMIN — CARVEDILOL 25 MG: 25 TABLET, FILM COATED ORAL at 20:19

## 2023-01-11 RX ADMIN — HYDROMORPHONE HYDROCHLORIDE 0.5 MG: 1 INJECTION, SOLUTION INTRAMUSCULAR; INTRAVENOUS; SUBCUTANEOUS at 09:19

## 2023-01-12 LAB
ALBUMIN SERPL-MCNC: 2.4 G/DL (ref 3.5–5.2)
ANION GAP SERPL CALCULATED.3IONS-SCNC: 9.8 MMOL/L (ref 5–15)
BASOPHILS # BLD AUTO: 0.06 10*3/MM3 (ref 0–0.2)
BASOPHILS NFR BLD AUTO: 0.4 % (ref 0–1.5)
BUN SERPL-MCNC: 36 MG/DL (ref 6–20)
BUN/CREAT SERPL: 11.7 (ref 7–25)
CALCIUM SPEC-SCNC: 7.6 MG/DL (ref 8.6–10.5)
CHLORIDE SERPL-SCNC: 98 MMOL/L (ref 98–107)
CO2 SERPL-SCNC: 24.2 MMOL/L (ref 22–29)
CREAT SERPL-MCNC: 3.08 MG/DL (ref 0.57–1)
CRP SERPL-MCNC: 8 MG/DL (ref 0–0.5)
DEPRECATED RDW RBC AUTO: 46.3 FL (ref 37–54)
EGFRCR SERPLBLD CKD-EPI 2021: 17.1 ML/MIN/1.73
EOSINOPHIL # BLD AUTO: 0.12 10*3/MM3 (ref 0–0.4)
EOSINOPHIL NFR BLD AUTO: 0.8 % (ref 0.3–6.2)
ERYTHROCYTE [DISTWIDTH] IN BLOOD BY AUTOMATED COUNT: 14.2 % (ref 12.3–15.4)
ERYTHROCYTE [SEDIMENTATION RATE] IN BLOOD: 30 MM/HR (ref 0–30)
GLUCOSE BLDC GLUCOMTR-MCNC: 114 MG/DL (ref 70–130)
GLUCOSE BLDC GLUCOMTR-MCNC: 142 MG/DL (ref 70–130)
GLUCOSE BLDC GLUCOMTR-MCNC: 154 MG/DL (ref 70–130)
GLUCOSE BLDC GLUCOMTR-MCNC: 80 MG/DL (ref 70–130)
GLUCOSE SERPL-MCNC: 185 MG/DL (ref 65–99)
HCT VFR BLD AUTO: 29.4 % (ref 34–46.6)
HGB BLD-MCNC: 9.2 G/DL (ref 12–15.9)
IMM GRANULOCYTES # BLD AUTO: 0.38 10*3/MM3 (ref 0–0.05)
IMM GRANULOCYTES NFR BLD AUTO: 2.6 % (ref 0–0.5)
LYMPHOCYTES # BLD AUTO: 1.21 10*3/MM3 (ref 0.7–3.1)
LYMPHOCYTES NFR BLD AUTO: 8.3 % (ref 19.6–45.3)
MCH RBC QN AUTO: 27.8 PG (ref 26.6–33)
MCHC RBC AUTO-ENTMCNC: 31.3 G/DL (ref 31.5–35.7)
MCV RBC AUTO: 88.8 FL (ref 79–97)
MONOCYTES # BLD AUTO: 1.5 10*3/MM3 (ref 0.1–0.9)
MONOCYTES NFR BLD AUTO: 10.3 % (ref 5–12)
NEUTROPHILS NFR BLD AUTO: 11.31 10*3/MM3 (ref 1.7–7)
NEUTROPHILS NFR BLD AUTO: 77.6 % (ref 42.7–76)
NRBC BLD AUTO-RTO: 0 /100 WBC (ref 0–0.2)
PHOSPHATE SERPL-MCNC: 3.3 MG/DL (ref 2.5–4.5)
PLATELET # BLD AUTO: 313 10*3/MM3 (ref 140–450)
PMV BLD AUTO: 10.3 FL (ref 6–12)
POTASSIUM SERPL-SCNC: 5.1 MMOL/L (ref 3.5–5.2)
PROCALCITONIN SERPL-MCNC: 0.88 NG/ML (ref 0–0.25)
RBC # BLD AUTO: 3.31 10*6/MM3 (ref 3.77–5.28)
SODIUM SERPL-SCNC: 132 MMOL/L (ref 136–145)
WBC NRBC COR # BLD: 14.58 10*3/MM3 (ref 3.4–10.8)

## 2023-01-12 PROCEDURE — 85652 RBC SED RATE AUTOMATED: CPT | Performed by: NURSE PRACTITIONER

## 2023-01-12 PROCEDURE — 63710000001 INSULIN LISPRO (HUMAN) PER 5 UNITS: Performed by: UROLOGY

## 2023-01-12 PROCEDURE — 25010000002 ONDANSETRON PER 1 MG: Performed by: UROLOGY

## 2023-01-12 PROCEDURE — 80069 RENAL FUNCTION PANEL: CPT | Performed by: UROLOGY

## 2023-01-12 PROCEDURE — 97530 THERAPEUTIC ACTIVITIES: CPT

## 2023-01-12 PROCEDURE — 97535 SELF CARE MNGMENT TRAINING: CPT

## 2023-01-12 PROCEDURE — 84145 PROCALCITONIN (PCT): CPT | Performed by: NURSE PRACTITIONER

## 2023-01-12 PROCEDURE — 97110 THERAPEUTIC EXERCISES: CPT

## 2023-01-12 PROCEDURE — 82962 GLUCOSE BLOOD TEST: CPT

## 2023-01-12 PROCEDURE — 86140 C-REACTIVE PROTEIN: CPT | Performed by: NURSE PRACTITIONER

## 2023-01-12 PROCEDURE — 25010000002 HYDROMORPHONE PER 4 MG: Performed by: INTERNAL MEDICINE

## 2023-01-12 PROCEDURE — 25010000002 HEPARIN (PORCINE) PER 1000 UNITS: Performed by: UROLOGY

## 2023-01-12 PROCEDURE — 85025 COMPLETE CBC W/AUTO DIFF WBC: CPT | Performed by: INTERNAL MEDICINE

## 2023-01-12 RX ADMIN — FOLIC ACID 1 MG: 1 TABLET ORAL at 08:57

## 2023-01-12 RX ADMIN — HEPARIN SODIUM 4000 UNITS: 1000 INJECTION INTRAVENOUS; SUBCUTANEOUS at 16:45

## 2023-01-12 RX ADMIN — HYDROXYZINE HYDROCHLORIDE 25 MG: 25 TABLET ORAL at 22:04

## 2023-01-12 RX ADMIN — Medication 3 ML: at 21:01

## 2023-01-12 RX ADMIN — INSULIN GLARGINE-YFGN 15 UNITS: 100 INJECTION, SOLUTION SUBCUTANEOUS at 21:02

## 2023-01-12 RX ADMIN — OXYCODONE HYDROCHLORIDE AND ACETAMINOPHEN 1 TABLET: 7.5; 325 TABLET ORAL at 16:42

## 2023-01-12 RX ADMIN — AMLODIPINE BESYLATE 10 MG: 10 TABLET ORAL at 16:42

## 2023-01-12 RX ADMIN — INSULIN LISPRO 2 UNITS: 100 INJECTION, SOLUTION INTRAVENOUS; SUBCUTANEOUS at 10:03

## 2023-01-12 RX ADMIN — LEVOTHYROXINE SODIUM 275 MCG: 0.17 TABLET ORAL at 05:38

## 2023-01-12 RX ADMIN — OXYCODONE HYDROCHLORIDE AND ACETAMINOPHEN 1 TABLET: 7.5; 325 TABLET ORAL at 05:38

## 2023-01-12 RX ADMIN — PANTOPRAZOLE SODIUM 40 MG: 40 TABLET, DELAYED RELEASE ORAL at 08:57

## 2023-01-12 RX ADMIN — HYDROMORPHONE HYDROCHLORIDE 0.25 MG: 1 INJECTION, SOLUTION INTRAMUSCULAR; INTRAVENOUS; SUBCUTANEOUS at 13:55

## 2023-01-12 RX ADMIN — Medication 10 ML: at 08:58

## 2023-01-12 RX ADMIN — ONDANSETRON 4 MG: 2 INJECTION INTRAMUSCULAR; INTRAVENOUS at 10:07

## 2023-01-12 RX ADMIN — HYDROMORPHONE HYDROCHLORIDE 0.25 MG: 1 INJECTION, SOLUTION INTRAMUSCULAR; INTRAVENOUS; SUBCUTANEOUS at 09:06

## 2023-01-12 RX ADMIN — Medication 10 ML: at 21:02

## 2023-01-12 RX ADMIN — CARVEDILOL 25 MG: 25 TABLET, FILM COATED ORAL at 21:03

## 2023-01-12 RX ADMIN — HYDROMORPHONE HYDROCHLORIDE 0.25 MG: 1 INJECTION, SOLUTION INTRAMUSCULAR; INTRAVENOUS; SUBCUTANEOUS at 19:42

## 2023-01-13 ENCOUNTER — APPOINTMENT (OUTPATIENT)
Dept: CT IMAGING | Facility: HOSPITAL | Age: 58
DRG: 270 | End: 2023-01-13
Payer: MEDICARE

## 2023-01-13 ENCOUNTER — APPOINTMENT (OUTPATIENT)
Dept: GENERAL RADIOLOGY | Facility: HOSPITAL | Age: 58
DRG: 270 | End: 2023-01-13
Payer: MEDICARE

## 2023-01-13 ENCOUNTER — APPOINTMENT (OUTPATIENT)
Dept: NEUROLOGY | Facility: HOSPITAL | Age: 58
DRG: 270 | End: 2023-01-13
Payer: MEDICARE

## 2023-01-13 LAB
ALBUMIN SERPL-MCNC: 2.5 G/DL (ref 3.5–5.2)
ALBUMIN SERPL-MCNC: 2.6 G/DL (ref 3.5–5.2)
ALBUMIN/GLOB SERPL: 0.8 G/DL
ALP SERPL-CCNC: 281 U/L (ref 39–117)
ALT SERPL W P-5'-P-CCNC: <5 U/L (ref 1–33)
ANION GAP SERPL CALCULATED.3IONS-SCNC: 7 MMOL/L (ref 5–15)
ANION GAP SERPL CALCULATED.3IONS-SCNC: 8.1 MMOL/L (ref 5–15)
ARTERIAL PATENCY WRIST A: POSITIVE
AST SERPL-CCNC: 16 U/L (ref 1–32)
ATMOSPHERIC PRESS: 750.3 MMHG
ATMOSPHERIC PRESS: 753.5 MMHG
BASE EXCESS BLDA CALC-SCNC: 3.4 MMOL/L (ref 0–2)
BASE EXCESS BLDV CALC-SCNC: 1.1 MMOL/L (ref -2–2)
BASOPHILS # BLD AUTO: 0.08 10*3/MM3 (ref 0–0.2)
BASOPHILS NFR BLD AUTO: 0.6 % (ref 0–1.5)
BDY SITE: ABNORMAL
BDY SITE: ABNORMAL
BILIRUB SERPL-MCNC: 0.9 MG/DL (ref 0–1.2)
BUN SERPL-MCNC: 24 MG/DL (ref 6–20)
BUN SERPL-MCNC: 25 MG/DL (ref 6–20)
BUN/CREAT SERPL: 9.6 (ref 7–25)
BUN/CREAT SERPL: 9.8 (ref 7–25)
CALCIUM SPEC-SCNC: 7.9 MG/DL (ref 8.6–10.5)
CALCIUM SPEC-SCNC: 8.2 MG/DL (ref 8.6–10.5)
CHLORIDE SERPL-SCNC: 99 MMOL/L (ref 98–107)
CHLORIDE SERPL-SCNC: 99 MMOL/L (ref 98–107)
CO2 SERPL-SCNC: 26.9 MMOL/L (ref 22–29)
CO2 SERPL-SCNC: 27 MMOL/L (ref 22–29)
CREAT SERPL-MCNC: 2.51 MG/DL (ref 0.57–1)
CREAT SERPL-MCNC: 2.56 MG/DL (ref 0.57–1)
D-LACTATE SERPL-SCNC: 0.6 MMOL/L (ref 0.5–2)
DEPRECATED RDW RBC AUTO: 45 FL (ref 37–54)
EGFRCR SERPLBLD CKD-EPI 2021: 21.3 ML/MIN/1.73
EGFRCR SERPLBLD CKD-EPI 2021: 21.8 ML/MIN/1.73
EOSINOPHIL # BLD AUTO: 0.09 10*3/MM3 (ref 0–0.4)
EOSINOPHIL NFR BLD AUTO: 0.6 % (ref 0.3–6.2)
ERYTHROCYTE [DISTWIDTH] IN BLOOD BY AUTOMATED COUNT: 14.2 % (ref 12.3–15.4)
GAS FLOW AIRWAY: 4 LPM
GLOBULIN UR ELPH-MCNC: 3.2 GM/DL
GLUCOSE BLDC GLUCOMTR-MCNC: 10 MG/DL (ref 70–130)
GLUCOSE BLDC GLUCOMTR-MCNC: 100 MG/DL (ref 70–130)
GLUCOSE BLDC GLUCOMTR-MCNC: 101 MG/DL (ref 70–130)
GLUCOSE BLDC GLUCOMTR-MCNC: 103 MG/DL (ref 70–130)
GLUCOSE BLDC GLUCOMTR-MCNC: 104 MG/DL (ref 70–130)
GLUCOSE BLDC GLUCOMTR-MCNC: 109 MG/DL (ref 70–130)
GLUCOSE BLDC GLUCOMTR-MCNC: 130 MG/DL (ref 70–130)
GLUCOSE BLDC GLUCOMTR-MCNC: 314 MG/DL (ref 70–130)
GLUCOSE BLDC GLUCOMTR-MCNC: 62 MG/DL (ref 70–130)
GLUCOSE BLDC GLUCOMTR-MCNC: 89 MG/DL (ref 70–130)
GLUCOSE BLDC GLUCOMTR-MCNC: 90 MG/DL (ref 70–130)
GLUCOSE BLDC GLUCOMTR-MCNC: 91 MG/DL (ref 70–130)
GLUCOSE BLDC GLUCOMTR-MCNC: 91 MG/DL (ref 70–130)
GLUCOSE BLDC GLUCOMTR-MCNC: 99 MG/DL (ref 70–130)
GLUCOSE BLDC GLUCOMTR-MCNC: <10 MG/DL (ref 70–130)
GLUCOSE SERPL-MCNC: 7 MG/DL (ref 65–99)
GLUCOSE SERPL-MCNC: 94 MG/DL (ref 65–99)
HCO3 BLDA-SCNC: 26.6 MMOL/L (ref 22–28)
HCO3 BLDV-SCNC: 26.3 MMOL/L (ref 22–28)
HCT VFR BLD AUTO: 32.2 % (ref 34–46.6)
HGB BLD-MCNC: 10 G/DL (ref 12–15.9)
IMM GRANULOCYTES # BLD AUTO: 0.2 10*3/MM3 (ref 0–0.05)
IMM GRANULOCYTES NFR BLD AUTO: 1.4 % (ref 0–0.5)
INHALED O2 CONCENTRATION: 100 %
LYMPHOCYTES # BLD AUTO: 0.74 10*3/MM3 (ref 0.7–3.1)
LYMPHOCYTES NFR BLD AUTO: 5.2 % (ref 19.6–45.3)
MCH RBC QN AUTO: 27.3 PG (ref 26.6–33)
MCHC RBC AUTO-ENTMCNC: 31.1 G/DL (ref 31.5–35.7)
MCV RBC AUTO: 88 FL (ref 79–97)
MODALITY: ABNORMAL
MODALITY: ABNORMAL
MONOCYTES # BLD AUTO: 1.49 10*3/MM3 (ref 0.1–0.9)
MONOCYTES NFR BLD AUTO: 10.4 % (ref 5–12)
NEUTROPHILS NFR BLD AUTO: 11.7 10*3/MM3 (ref 1.7–7)
NEUTROPHILS NFR BLD AUTO: 81.8 % (ref 42.7–76)
NRBC BLD AUTO-RTO: 0 /100 WBC (ref 0–0.2)
PCO2 BLDA: 34.4 MM HG (ref 35–45)
PCO2 BLDV: 42.8 MM HG (ref 41–51)
PEEP RESPIRATORY: 5 CM[H2O]
PH BLDA: 7.5 PH UNITS (ref 7.35–7.45)
PH BLDV: 7.39 PH UNITS (ref 7.31–7.41)
PHOSPHATE SERPL-MCNC: 3 MG/DL (ref 2.5–4.5)
PLATELET # BLD AUTO: 383 10*3/MM3 (ref 140–450)
PMV BLD AUTO: 9.9 FL (ref 6–12)
PO2 BLDA: 135.8 MM HG (ref 80–100)
PO2 BLDV: 46.3 MM HG (ref 35–45)
POTASSIUM SERPL-SCNC: 4.1 MMOL/L (ref 3.5–5.2)
POTASSIUM SERPL-SCNC: 4.3 MMOL/L (ref 3.5–5.2)
PROT SERPL-MCNC: 5.8 G/DL (ref 6–8.5)
RBC # BLD AUTO: 3.66 10*6/MM3 (ref 3.77–5.28)
SAO2 % BLDCOA: 81.5 % (ref 92–99)
SAO2 % BLDCOA: 99.3 % (ref 92–99)
SET MECH RESP RATE: 18
SODIUM SERPL-SCNC: 133 MMOL/L (ref 136–145)
SODIUM SERPL-SCNC: 134 MMOL/L (ref 136–145)
TOTAL RATE: 10 BREATHS/MINUTE
TOTAL RATE: 18 BREATHS/MINUTE
VENTILATOR MODE: ABNORMAL
VT ON VENT VENT: 450 ML
WBC NRBC COR # BLD: 14.3 10*3/MM3 (ref 3.4–10.8)

## 2023-01-13 PROCEDURE — 95819 EEG AWAKE AND ASLEEP: CPT | Performed by: STUDENT IN AN ORGANIZED HEALTH CARE EDUCATION/TRAINING PROGRAM

## 2023-01-13 PROCEDURE — 82962 GLUCOSE BLOOD TEST: CPT

## 2023-01-13 PROCEDURE — 25010000002 PROPOFOL 10 MG/ML EMULSION

## 2023-01-13 PROCEDURE — C1751 CATH, INF, PER/CENT/MIDLINE: HCPCS

## 2023-01-13 PROCEDURE — 94799 UNLISTED PULMONARY SVC/PX: CPT

## 2023-01-13 PROCEDURE — 95819 EEG AWAKE AND ASLEEP: CPT

## 2023-01-13 PROCEDURE — 99223 1ST HOSP IP/OBS HIGH 75: CPT | Performed by: PSYCHIATRY & NEUROLOGY

## 2023-01-13 PROCEDURE — 85025 COMPLETE CBC W/AUTO DIFF WBC: CPT | Performed by: INTERNAL MEDICINE

## 2023-01-13 PROCEDURE — 5A1945Z RESPIRATORY VENTILATION, 24-96 CONSECUTIVE HOURS: ICD-10-PCS | Performed by: INTERNAL MEDICINE

## 2023-01-13 PROCEDURE — 71045 X-RAY EXAM CHEST 1 VIEW: CPT

## 2023-01-13 PROCEDURE — 02HV33Z INSERTION OF INFUSION DEVICE INTO SUPERIOR VENA CAVA, PERCUTANEOUS APPROACH: ICD-10-PCS | Performed by: INTERNAL MEDICINE

## 2023-01-13 PROCEDURE — 94002 VENT MGMT INPAT INIT DAY: CPT

## 2023-01-13 PROCEDURE — 25010000002 PROPOFOL 10 MG/ML EMULSION: Performed by: INTERNAL MEDICINE

## 2023-01-13 PROCEDURE — 25010000002 HYDROMORPHONE PER 4 MG: Performed by: INTERNAL MEDICINE

## 2023-01-13 PROCEDURE — 70450 CT HEAD/BRAIN W/O DYE: CPT

## 2023-01-13 PROCEDURE — 82803 BLOOD GASES ANY COMBINATION: CPT

## 2023-01-13 PROCEDURE — 31500 INSERT EMERGENCY AIRWAY: CPT

## 2023-01-13 PROCEDURE — 25010000002 EPOETIN ALFA-EPBX 10000 UNIT/ML SOLUTION: Performed by: UROLOGY

## 2023-01-13 PROCEDURE — 94761 N-INVAS EAR/PLS OXIMETRY MLT: CPT

## 2023-01-13 PROCEDURE — 80053 COMPREHEN METABOLIC PANEL: CPT | Performed by: UROLOGY

## 2023-01-13 PROCEDURE — 84100 ASSAY OF PHOSPHORUS: CPT | Performed by: UROLOGY

## 2023-01-13 PROCEDURE — 83605 ASSAY OF LACTIC ACID: CPT | Performed by: INTERNAL MEDICINE

## 2023-01-13 PROCEDURE — 0BH17EZ INSERTION OF ENDOTRACHEAL AIRWAY INTO TRACHEA, VIA NATURAL OR ARTIFICIAL OPENING: ICD-10-PCS | Performed by: INTERNAL MEDICINE

## 2023-01-13 PROCEDURE — 25010000002 LEVETRIRACETAM PER 10 MG: Performed by: INTERNAL MEDICINE

## 2023-01-13 PROCEDURE — 25010000002 LORAZEPAM PER 2 MG

## 2023-01-13 PROCEDURE — 25010000002 LORAZEPAM PER 2 MG: Performed by: INTERNAL MEDICINE

## 2023-01-13 PROCEDURE — 36600 WITHDRAWAL OF ARTERIAL BLOOD: CPT

## 2023-01-13 RX ORDER — LORAZEPAM 2 MG/ML
INJECTION INTRAMUSCULAR
Status: COMPLETED
Start: 2023-01-13 | End: 2023-01-13

## 2023-01-13 RX ORDER — FENTANYL CITRATE 50 UG/ML
INJECTION, SOLUTION INTRAMUSCULAR; INTRAVENOUS
Status: DISCONTINUED
Start: 2023-01-13 | End: 2023-01-13 | Stop reason: WASHOUT

## 2023-01-13 RX ORDER — LEVETIRACETAM 500 MG/5ML
1000 INJECTION, SOLUTION, CONCENTRATE INTRAVENOUS EVERY 12 HOURS SCHEDULED
Status: DISCONTINUED | OUTPATIENT
Start: 2023-01-13 | End: 2023-01-13

## 2023-01-13 RX ORDER — PROPOFOL 10 MG/ML
100 VIAL (ML) INTRAVENOUS ONCE
Status: COMPLETED | OUTPATIENT
Start: 2023-01-13 | End: 2023-01-13

## 2023-01-13 RX ORDER — PROPOFOL 10 MG/ML
VIAL (ML) INTRAVENOUS
Status: COMPLETED
Start: 2023-01-13 | End: 2023-01-13

## 2023-01-13 RX ORDER — LEVETIRACETAM 500 MG/5ML
500 INJECTION, SOLUTION, CONCENTRATE INTRAVENOUS DAILY
Status: DISCONTINUED | OUTPATIENT
Start: 2023-01-14 | End: 2023-01-16

## 2023-01-13 RX ORDER — PANTOPRAZOLE SODIUM 40 MG/10ML
40 INJECTION, POWDER, LYOPHILIZED, FOR SOLUTION INTRAVENOUS
Status: DISCONTINUED | OUTPATIENT
Start: 2023-01-14 | End: 2023-01-18

## 2023-01-13 RX ORDER — DEXTROSE MONOHYDRATE 100 MG/ML
15 INJECTION, SOLUTION INTRAVENOUS CONTINUOUS
Status: DISCONTINUED | OUTPATIENT
Start: 2023-01-13 | End: 2023-01-14

## 2023-01-13 RX ORDER — DEXTROSE MONOHYDRATE 50 MG/ML
50 INJECTION, SOLUTION INTRAVENOUS CONTINUOUS
Status: DISCONTINUED | OUTPATIENT
Start: 2023-01-13 | End: 2023-01-13

## 2023-01-13 RX ORDER — HYDROMORPHONE HYDROCHLORIDE 1 MG/ML
0.5 INJECTION, SOLUTION INTRAMUSCULAR; INTRAVENOUS; SUBCUTANEOUS
Status: DISCONTINUED | OUTPATIENT
Start: 2023-01-13 | End: 2023-01-23

## 2023-01-13 RX ORDER — LORAZEPAM 2 MG/ML
2 INJECTION INTRAMUSCULAR ONCE
Status: COMPLETED | OUTPATIENT
Start: 2023-01-13 | End: 2023-01-13

## 2023-01-13 RX ORDER — LORAZEPAM 2 MG/ML
1 INJECTION INTRAMUSCULAR ONCE
Status: COMPLETED | OUTPATIENT
Start: 2023-01-13 | End: 2023-01-13

## 2023-01-13 RX ORDER — LORAZEPAM 2 MG/ML
INJECTION INTRAMUSCULAR
Status: ACTIVE
Start: 2023-01-13 | End: 2023-01-13

## 2023-01-13 RX ADMIN — DEXTROSE MONOHYDRATE 50 ML/HR: 50 INJECTION, SOLUTION INTRAVENOUS at 08:34

## 2023-01-13 RX ADMIN — PROPOFOL 100 MG: 10 INJECTION, EMULSION INTRAVENOUS at 06:30

## 2023-01-13 RX ADMIN — Medication 10 ML: at 08:45

## 2023-01-13 RX ADMIN — HYDROMORPHONE HYDROCHLORIDE 0.5 MG: 1 INJECTION, SOLUTION INTRAMUSCULAR; INTRAVENOUS; SUBCUTANEOUS at 18:47

## 2023-01-13 RX ADMIN — PROPOFOL INJECTABLE EMULSION 25 MCG/KG/MIN: 10 INJECTION, EMULSION INTRAVENOUS at 13:53

## 2023-01-13 RX ADMIN — SODIUM CHLORIDE 1000 ML: 9 INJECTION, SOLUTION INTRAVENOUS at 06:40

## 2023-01-13 RX ADMIN — LORAZEPAM 2 MG: 2 INJECTION INTRAMUSCULAR at 06:30

## 2023-01-13 RX ADMIN — LORAZEPAM 1 MG: 2 INJECTION INTRAMUSCULAR; INTRAVENOUS at 05:52

## 2023-01-13 RX ADMIN — LORAZEPAM 2 MG: 2 INJECTION INTRAMUSCULAR; INTRAVENOUS at 06:30

## 2023-01-13 RX ADMIN — DEXTROSE MONOHYDRATE 30 ML/HR: 100 INJECTION, SOLUTION INTRAVENOUS at 09:23

## 2023-01-13 RX ADMIN — LEVETIRACETAM 1000 MG: 500 INJECTION, SOLUTION INTRAVENOUS at 08:32

## 2023-01-13 RX ADMIN — PROPOFOL INJECTABLE EMULSION 20 MCG/KG/MIN: 10 INJECTION, EMULSION INTRAVENOUS at 17:06

## 2023-01-13 RX ADMIN — EPOETIN ALFA-EPBX 10000 UNITS: 10000 INJECTION, SOLUTION INTRAVENOUS; SUBCUTANEOUS at 17:07

## 2023-01-13 RX ADMIN — DEXTROSE MONOHYDRATE 25 G: 25 INJECTION, SOLUTION INTRAVENOUS at 07:08

## 2023-01-13 RX ADMIN — Medication 3 ML: at 08:44

## 2023-01-13 RX ADMIN — HYDROMORPHONE HYDROCHLORIDE 0.5 MG: 1 INJECTION, SOLUTION INTRAMUSCULAR; INTRAVENOUS; SUBCUTANEOUS at 10:46

## 2023-01-13 RX ADMIN — LIDOCAINE 2 PATCH: 700 PATCH TOPICAL at 17:07

## 2023-01-13 RX ADMIN — Medication 100 MG: at 06:30

## 2023-01-13 RX ADMIN — PROPOFOL INJECTABLE EMULSION 10 MCG/KG/MIN: 10 INJECTION, EMULSION INTRAVENOUS at 06:39

## 2023-01-14 LAB
ALBUMIN SERPL-MCNC: 2.4 G/DL (ref 3.5–5.2)
ANION GAP SERPL CALCULATED.3IONS-SCNC: 11 MMOL/L (ref 5–15)
BASOPHILS # BLD AUTO: 0.05 10*3/MM3 (ref 0–0.2)
BASOPHILS NFR BLD AUTO: 0.3 % (ref 0–1.5)
BUN SERPL-MCNC: 27 MG/DL (ref 6–20)
BUN/CREAT SERPL: 8.9 (ref 7–25)
CALCIUM SPEC-SCNC: 7.7 MG/DL (ref 8.6–10.5)
CHLORIDE SERPL-SCNC: 96 MMOL/L (ref 98–107)
CO2 SERPL-SCNC: 24 MMOL/L (ref 22–29)
CREAT SERPL-MCNC: 3.04 MG/DL (ref 0.57–1)
DEPRECATED RDW RBC AUTO: 45.7 FL (ref 37–54)
EGFRCR SERPLBLD CKD-EPI 2021: 17.3 ML/MIN/1.73
EOSINOPHIL # BLD AUTO: 0.03 10*3/MM3 (ref 0–0.4)
EOSINOPHIL NFR BLD AUTO: 0.2 % (ref 0.3–6.2)
ERYTHROCYTE [DISTWIDTH] IN BLOOD BY AUTOMATED COUNT: 14.4 % (ref 12.3–15.4)
GLUCOSE BLDC GLUCOMTR-MCNC: 119 MG/DL (ref 70–130)
GLUCOSE BLDC GLUCOMTR-MCNC: 121 MG/DL (ref 70–130)
GLUCOSE BLDC GLUCOMTR-MCNC: 129 MG/DL (ref 70–130)
GLUCOSE BLDC GLUCOMTR-MCNC: 129 MG/DL (ref 70–130)
GLUCOSE BLDC GLUCOMTR-MCNC: 134 MG/DL (ref 70–130)
GLUCOSE BLDC GLUCOMTR-MCNC: 141 MG/DL (ref 70–130)
GLUCOSE BLDC GLUCOMTR-MCNC: 142 MG/DL (ref 70–130)
GLUCOSE BLDC GLUCOMTR-MCNC: 143 MG/DL (ref 70–130)
GLUCOSE BLDC GLUCOMTR-MCNC: 145 MG/DL (ref 70–130)
GLUCOSE BLDC GLUCOMTR-MCNC: 153 MG/DL (ref 70–130)
GLUCOSE SERPL-MCNC: 141 MG/DL (ref 65–99)
HCT VFR BLD AUTO: 28.2 % (ref 34–46.6)
HGB BLD-MCNC: 8.9 G/DL (ref 12–15.9)
IMM GRANULOCYTES # BLD AUTO: 0.11 10*3/MM3 (ref 0–0.05)
IMM GRANULOCYTES NFR BLD AUTO: 0.7 % (ref 0–0.5)
LYMPHOCYTES # BLD AUTO: 0.83 10*3/MM3 (ref 0.7–3.1)
LYMPHOCYTES NFR BLD AUTO: 5.6 % (ref 19.6–45.3)
MCH RBC QN AUTO: 27.6 PG (ref 26.6–33)
MCHC RBC AUTO-ENTMCNC: 31.6 G/DL (ref 31.5–35.7)
MCV RBC AUTO: 87.3 FL (ref 79–97)
MONOCYTES # BLD AUTO: 1.15 10*3/MM3 (ref 0.1–0.9)
MONOCYTES NFR BLD AUTO: 7.8 % (ref 5–12)
NEUTROPHILS NFR BLD AUTO: 12.6 10*3/MM3 (ref 1.7–7)
NEUTROPHILS NFR BLD AUTO: 85.4 % (ref 42.7–76)
NRBC BLD AUTO-RTO: 0 /100 WBC (ref 0–0.2)
PHOSPHATE SERPL-MCNC: 3.5 MG/DL (ref 2.5–4.5)
PLATELET # BLD AUTO: 356 10*3/MM3 (ref 140–450)
PMV BLD AUTO: 10.1 FL (ref 6–12)
POTASSIUM SERPL-SCNC: 4.4 MMOL/L (ref 3.5–5.2)
RBC # BLD AUTO: 3.23 10*6/MM3 (ref 3.77–5.28)
SODIUM SERPL-SCNC: 131 MMOL/L (ref 136–145)
WBC NRBC COR # BLD: 14.77 10*3/MM3 (ref 3.4–10.8)

## 2023-01-14 PROCEDURE — 25010000002 MORPHINE PER 10 MG: Performed by: INTERNAL MEDICINE

## 2023-01-14 PROCEDURE — 94761 N-INVAS EAR/PLS OXIMETRY MLT: CPT

## 2023-01-14 PROCEDURE — 25010000002 LEVETRIRACETAM PER 10 MG: Performed by: PSYCHIATRY & NEUROLOGY

## 2023-01-14 PROCEDURE — 0 LIDOCAINE 1 % SOLUTION: Performed by: INTERNAL MEDICINE

## 2023-01-14 PROCEDURE — 25010000002 PROPOFOL 10 MG/ML EMULSION: Performed by: INTERNAL MEDICINE

## 2023-01-14 PROCEDURE — 80069 RENAL FUNCTION PANEL: CPT | Performed by: UROLOGY

## 2023-01-14 PROCEDURE — 25010000002 FENTANYL CITRATE (PF) 50 MCG/ML SOLUTION: Performed by: UROLOGY

## 2023-01-14 PROCEDURE — 85025 COMPLETE CBC W/AUTO DIFF WBC: CPT | Performed by: INTERNAL MEDICINE

## 2023-01-14 PROCEDURE — 94003 VENT MGMT INPAT SUBQ DAY: CPT

## 2023-01-14 PROCEDURE — 94799 UNLISTED PULMONARY SVC/PX: CPT

## 2023-01-14 PROCEDURE — 99232 SBSQ HOSP IP/OBS MODERATE 35: CPT | Performed by: PSYCHIATRY & NEUROLOGY

## 2023-01-14 PROCEDURE — 94760 N-INVAS EAR/PLS OXIMETRY 1: CPT

## 2023-01-14 PROCEDURE — 82962 GLUCOSE BLOOD TEST: CPT

## 2023-01-14 RX ORDER — LIDOCAINE HYDROCHLORIDE 10 MG/ML
10 INJECTION, SOLUTION INFILTRATION; PERINEURAL ONCE
Status: COMPLETED | OUTPATIENT
Start: 2023-01-14 | End: 2023-01-14

## 2023-01-14 RX ORDER — MORPHINE SULFATE 2 MG/ML
4 INJECTION, SOLUTION INTRAMUSCULAR; INTRAVENOUS ONCE
Status: COMPLETED | OUTPATIENT
Start: 2023-01-14 | End: 2023-01-14

## 2023-01-14 RX ADMIN — LIDOCAINE 2 PATCH: 700 PATCH TOPICAL at 08:12

## 2023-01-14 RX ADMIN — FENTANYL CITRATE 25 MCG: 50 INJECTION, SOLUTION INTRAMUSCULAR; INTRAVENOUS at 01:23

## 2023-01-14 RX ADMIN — PROPOFOL INJECTABLE EMULSION 25 MCG/KG/MIN: 10 INJECTION, EMULSION INTRAVENOUS at 22:10

## 2023-01-14 RX ADMIN — Medication 10 ML: at 21:05

## 2023-01-14 RX ADMIN — PROPOFOL INJECTABLE EMULSION 30 MCG/KG/MIN: 10 INJECTION, EMULSION INTRAVENOUS at 01:19

## 2023-01-14 RX ADMIN — PANTOPRAZOLE SODIUM 40 MG: 40 INJECTION, POWDER, FOR SOLUTION INTRAVENOUS at 05:55

## 2023-01-14 RX ADMIN — LEVETIRACETAM 500 MG: 500 INJECTION, SOLUTION INTRAVENOUS at 08:00

## 2023-01-14 RX ADMIN — Medication 3 ML: at 08:01

## 2023-01-14 RX ADMIN — PROPOFOL INJECTABLE EMULSION 20 MCG/KG/MIN: 10 INJECTION, EMULSION INTRAVENOUS at 07:47

## 2023-01-14 RX ADMIN — FENTANYL CITRATE 25 MCG: 50 INJECTION, SOLUTION INTRAMUSCULAR; INTRAVENOUS at 08:13

## 2023-01-14 RX ADMIN — Medication 10 ML: at 08:01

## 2023-01-14 RX ADMIN — FENTANYL CITRATE 25 MCG: 50 INJECTION, SOLUTION INTRAMUSCULAR; INTRAVENOUS at 13:59

## 2023-01-14 RX ADMIN — LIDOCAINE HYDROCHLORIDE 10 ML: 10 INJECTION, SOLUTION INFILTRATION; PERINEURAL at 11:06

## 2023-01-14 RX ADMIN — Medication 3 ML: at 21:06

## 2023-01-14 RX ADMIN — MORPHINE SULFATE 4 MG: 2 INJECTION, SOLUTION INTRAMUSCULAR; INTRAVENOUS at 10:01

## 2023-01-15 LAB
ALBUMIN SERPL-MCNC: 2.5 G/DL (ref 3.5–5.2)
ANION GAP SERPL CALCULATED.3IONS-SCNC: 10 MMOL/L (ref 5–15)
BUN SERPL-MCNC: 19 MG/DL (ref 6–20)
BUN/CREAT SERPL: 8.3 (ref 7–25)
CALCIUM SPEC-SCNC: 7.8 MG/DL (ref 8.6–10.5)
CHLORIDE SERPL-SCNC: 102 MMOL/L (ref 98–107)
CO2 SERPL-SCNC: 24 MMOL/L (ref 22–29)
CORTIS SERPL-MCNC: 17.5 MCG/DL
CREAT SERPL-MCNC: 2.28 MG/DL (ref 0.57–1)
DEPRECATED RDW RBC AUTO: 45.5 FL (ref 37–54)
EGFRCR SERPLBLD CKD-EPI 2021: 24.5 ML/MIN/1.73
ERYTHROCYTE [DISTWIDTH] IN BLOOD BY AUTOMATED COUNT: 14.4 % (ref 12.3–15.4)
GLUCOSE BLDC GLUCOMTR-MCNC: 106 MG/DL (ref 70–130)
GLUCOSE BLDC GLUCOMTR-MCNC: 80 MG/DL (ref 70–130)
GLUCOSE BLDC GLUCOMTR-MCNC: 83 MG/DL (ref 70–130)
GLUCOSE BLDC GLUCOMTR-MCNC: 91 MG/DL (ref 70–130)
GLUCOSE BLDC GLUCOMTR-MCNC: 95 MG/DL (ref 70–130)
GLUCOSE BLDC GLUCOMTR-MCNC: 96 MG/DL (ref 70–130)
GLUCOSE BLDC GLUCOMTR-MCNC: 96 MG/DL (ref 70–130)
GLUCOSE SERPL-MCNC: 107 MG/DL (ref 65–99)
HCT VFR BLD AUTO: 28.5 % (ref 34–46.6)
HGB BLD-MCNC: 8.9 G/DL (ref 12–15.9)
MCH RBC QN AUTO: 27.4 PG (ref 26.6–33)
MCHC RBC AUTO-ENTMCNC: 31.2 G/DL (ref 31.5–35.7)
MCV RBC AUTO: 87.7 FL (ref 79–97)
PHOSPHATE SERPL-MCNC: 3.1 MG/DL (ref 2.5–4.5)
PLATELET # BLD AUTO: 382 10*3/MM3 (ref 140–450)
PMV BLD AUTO: 9.9 FL (ref 6–12)
POTASSIUM SERPL-SCNC: 4.1 MMOL/L (ref 3.5–5.2)
RBC # BLD AUTO: 3.25 10*6/MM3 (ref 3.77–5.28)
SODIUM SERPL-SCNC: 136 MMOL/L (ref 136–145)
WBC NRBC COR # BLD: 22.86 10*3/MM3 (ref 3.4–10.8)

## 2023-01-15 PROCEDURE — 94799 UNLISTED PULMONARY SVC/PX: CPT

## 2023-01-15 PROCEDURE — 82962 GLUCOSE BLOOD TEST: CPT

## 2023-01-15 PROCEDURE — 85027 COMPLETE CBC AUTOMATED: CPT | Performed by: UROLOGY

## 2023-01-15 PROCEDURE — 25010000002 LEVETRIRACETAM PER 10 MG: Performed by: PSYCHIATRY & NEUROLOGY

## 2023-01-15 PROCEDURE — 25010000002 HYDROMORPHONE PER 4 MG: Performed by: INTERNAL MEDICINE

## 2023-01-15 PROCEDURE — 99232 SBSQ HOSP IP/OBS MODERATE 35: CPT | Performed by: PSYCHIATRY & NEUROLOGY

## 2023-01-15 PROCEDURE — 80069 RENAL FUNCTION PANEL: CPT | Performed by: UROLOGY

## 2023-01-15 PROCEDURE — 25010000002 FENTANYL CITRATE (PF) 50 MCG/ML SOLUTION: Performed by: INTERNAL MEDICINE

## 2023-01-15 PROCEDURE — 94003 VENT MGMT INPAT SUBQ DAY: CPT

## 2023-01-15 PROCEDURE — 25010000002 FENTANYL CITRATE (PF) 50 MCG/ML SOLUTION: Performed by: UROLOGY

## 2023-01-15 PROCEDURE — 82533 TOTAL CORTISOL: CPT | Performed by: INTERNAL MEDICINE

## 2023-01-15 PROCEDURE — 25010000002 PROPOFOL 10 MG/ML EMULSION: Performed by: INTERNAL MEDICINE

## 2023-01-15 PROCEDURE — 94761 N-INVAS EAR/PLS OXIMETRY MLT: CPT

## 2023-01-15 RX ORDER — MANNITOL 250 MG/ML
12.5 INJECTION, SOLUTION INTRAVENOUS AS NEEDED
Status: DISCONTINUED | OUTPATIENT
Start: 2023-01-15 | End: 2023-01-15 | Stop reason: SDUPTHER

## 2023-01-15 RX ORDER — LEVOTHYROXINE SODIUM 0.1 MG/1
200 TABLET ORAL
Status: DISCONTINUED | OUTPATIENT
Start: 2023-01-16 | End: 2023-01-24

## 2023-01-15 RX ORDER — FENTANYL CITRATE 50 UG/ML
75 INJECTION, SOLUTION INTRAMUSCULAR; INTRAVENOUS
Status: DISCONTINUED | OUTPATIENT
Start: 2023-01-15 | End: 2023-01-16

## 2023-01-15 RX ORDER — MANNITOL 250 MG/ML
12.5 INJECTION, SOLUTION INTRAVENOUS AS NEEDED
Status: ACTIVE | OUTPATIENT
Start: 2023-01-15 | End: 2023-01-16

## 2023-01-15 RX ADMIN — Medication 3 ML: at 08:03

## 2023-01-15 RX ADMIN — FENTANYL CITRATE 75 MCG: 50 INJECTION, SOLUTION INTRAMUSCULAR; INTRAVENOUS at 10:08

## 2023-01-15 RX ADMIN — HYDROMORPHONE HYDROCHLORIDE 0.5 MG: 1 INJECTION, SOLUTION INTRAMUSCULAR; INTRAVENOUS; SUBCUTANEOUS at 23:23

## 2023-01-15 RX ADMIN — Medication 10 ML: at 08:02

## 2023-01-15 RX ADMIN — FENTANYL CITRATE 25 MCG: 50 INJECTION, SOLUTION INTRAMUSCULAR; INTRAVENOUS at 09:19

## 2023-01-15 RX ADMIN — LEVETIRACETAM 500 MG: 500 INJECTION, SOLUTION INTRAVENOUS at 08:02

## 2023-01-15 RX ADMIN — LIDOCAINE 2 PATCH: 700 PATCH TOPICAL at 08:02

## 2023-01-15 RX ADMIN — PANTOPRAZOLE SODIUM 40 MG: 40 INJECTION, POWDER, FOR SOLUTION INTRAVENOUS at 05:26

## 2023-01-15 RX ADMIN — FENTANYL CITRATE 75 MCG: 50 INJECTION, SOLUTION INTRAMUSCULAR; INTRAVENOUS at 21:03

## 2023-01-15 RX ADMIN — HYDROMORPHONE HYDROCHLORIDE 0.5 MG: 1 INJECTION, SOLUTION INTRAMUSCULAR; INTRAVENOUS; SUBCUTANEOUS at 11:49

## 2023-01-15 RX ADMIN — PROPOFOL INJECTABLE EMULSION 25 MCG/KG/MIN: 10 INJECTION, EMULSION INTRAVENOUS at 05:22

## 2023-01-16 LAB
ALBUMIN SERPL-MCNC: 2.6 G/DL (ref 3.5–5.2)
ANION GAP SERPL CALCULATED.3IONS-SCNC: 17.6 MMOL/L (ref 5–15)
ARTERIAL PATENCY WRIST A: POSITIVE
ATMOSPHERIC PRESS: 747.2 MMHG
BASE EXCESS BLDA CALC-SCNC: -2.6 MMOL/L (ref 0–2)
BDY SITE: ABNORMAL
BUN SERPL-MCNC: 29 MG/DL (ref 6–20)
BUN/CREAT SERPL: 8.7 (ref 7–25)
CALCIUM SPEC-SCNC: 7.8 MG/DL (ref 8.6–10.5)
CHLORIDE SERPL-SCNC: 102 MMOL/L (ref 98–107)
CO2 SERPL-SCNC: 18.4 MMOL/L (ref 22–29)
CREAT SERPL-MCNC: 3.32 MG/DL (ref 0.57–1)
DEPRECATED RDW RBC AUTO: 46.2 FL (ref 37–54)
EGFRCR SERPLBLD CKD-EPI 2021: 15.6 ML/MIN/1.73
ERYTHROCYTE [DISTWIDTH] IN BLOOD BY AUTOMATED COUNT: 14.5 % (ref 12.3–15.4)
GLUCOSE BLDC GLUCOMTR-MCNC: 126 MG/DL (ref 70–130)
GLUCOSE BLDC GLUCOMTR-MCNC: 65 MG/DL (ref 70–130)
GLUCOSE BLDC GLUCOMTR-MCNC: 75 MG/DL (ref 70–130)
GLUCOSE BLDC GLUCOMTR-MCNC: 78 MG/DL (ref 70–130)
GLUCOSE BLDC GLUCOMTR-MCNC: 80 MG/DL (ref 70–130)
GLUCOSE BLDC GLUCOMTR-MCNC: 95 MG/DL (ref 70–130)
GLUCOSE SERPL-MCNC: 78 MG/DL (ref 65–99)
HCO3 BLDA-SCNC: 22.5 MMOL/L (ref 22–28)
HCT VFR BLD AUTO: 27 % (ref 34–46.6)
HGB BLD-MCNC: 8.4 G/DL (ref 12–15.9)
INHALED O2 CONCENTRATION: 30 %
MCH RBC QN AUTO: 27.5 PG (ref 26.6–33)
MCHC RBC AUTO-ENTMCNC: 31.1 G/DL (ref 31.5–35.7)
MCV RBC AUTO: 88.2 FL (ref 79–97)
MODALITY: ABNORMAL
O2 A-A PPRESDIFF RESPIRATORY: 0.6 MMHG
PCO2 BLDA: 39 MM HG (ref 35–45)
PEEP RESPIRATORY: 5 CM[H2O]
PH BLDA: 7.37 PH UNITS (ref 7.35–7.45)
PHOSPHATE SERPL-MCNC: 4.3 MG/DL (ref 2.5–4.5)
PLATELET # BLD AUTO: 383 10*3/MM3 (ref 140–450)
PMV BLD AUTO: 9.6 FL (ref 6–12)
PO2 BLDA: 109.4 MM HG (ref 80–100)
POTASSIUM SERPL-SCNC: 4.6 MMOL/L (ref 3.5–5.2)
PSV: 4 CMH2O
RBC # BLD AUTO: 3.06 10*6/MM3 (ref 3.77–5.28)
SAO2 % BLDCOA: 98.1 % (ref 92–99)
SODIUM SERPL-SCNC: 138 MMOL/L (ref 136–145)
TOTAL RATE: 12 BREATHS/MINUTE
VENTILATOR MODE: ABNORMAL
WBC NRBC COR # BLD: 17.14 10*3/MM3 (ref 3.4–10.8)

## 2023-01-16 PROCEDURE — 82803 BLOOD GASES ANY COMBINATION: CPT

## 2023-01-16 PROCEDURE — 94760 N-INVAS EAR/PLS OXIMETRY 1: CPT

## 2023-01-16 PROCEDURE — 25010000002 FENTANYL CITRATE (PF) 50 MCG/ML SOLUTION: Performed by: INTERNAL MEDICINE

## 2023-01-16 PROCEDURE — 36600 WITHDRAWAL OF ARTERIAL BLOOD: CPT

## 2023-01-16 PROCEDURE — 94799 UNLISTED PULMONARY SVC/PX: CPT

## 2023-01-16 PROCEDURE — 80069 RENAL FUNCTION PANEL: CPT | Performed by: UROLOGY

## 2023-01-16 PROCEDURE — 25010000002 HYDROMORPHONE PER 4 MG: Performed by: INTERNAL MEDICINE

## 2023-01-16 PROCEDURE — 85027 COMPLETE CBC AUTOMATED: CPT | Performed by: UROLOGY

## 2023-01-16 PROCEDURE — 25010000002 PROPOFOL 10 MG/ML EMULSION: Performed by: INTERNAL MEDICINE

## 2023-01-16 PROCEDURE — 94761 N-INVAS EAR/PLS OXIMETRY MLT: CPT

## 2023-01-16 PROCEDURE — 94003 VENT MGMT INPAT SUBQ DAY: CPT

## 2023-01-16 PROCEDURE — 25010000002 LEVETRIRACETAM PER 10 MG: Performed by: PSYCHIATRY & NEUROLOGY

## 2023-01-16 PROCEDURE — 82962 GLUCOSE BLOOD TEST: CPT

## 2023-01-16 RX ORDER — LEVETIRACETAM 500 MG/5ML
250 INJECTION, SOLUTION, CONCENTRATE INTRAVENOUS DAILY
Status: DISCONTINUED | OUTPATIENT
Start: 2023-01-17 | End: 2023-01-17

## 2023-01-16 RX ORDER — MORPHINE SULFATE 2 MG/ML
2 INJECTION, SOLUTION INTRAMUSCULAR; INTRAVENOUS EVERY 4 HOURS PRN
Status: DISPENSED | OUTPATIENT
Start: 2023-01-16 | End: 2023-01-23

## 2023-01-16 RX ADMIN — Medication 3 ML: at 08:01

## 2023-01-16 RX ADMIN — HYDROMORPHONE HYDROCHLORIDE 0.5 MG: 1 INJECTION, SOLUTION INTRAMUSCULAR; INTRAVENOUS; SUBCUTANEOUS at 19:07

## 2023-01-16 RX ADMIN — Medication 10 ML: at 08:01

## 2023-01-16 RX ADMIN — HYDROMORPHONE HYDROCHLORIDE 0.5 MG: 1 INJECTION, SOLUTION INTRAMUSCULAR; INTRAVENOUS; SUBCUTANEOUS at 03:03

## 2023-01-16 RX ADMIN — DEXTROSE MONOHYDRATE 25 G: 25 INJECTION, SOLUTION INTRAVENOUS at 20:35

## 2023-01-16 RX ADMIN — FENTANYL CITRATE 75 MCG: 50 INJECTION, SOLUTION INTRAMUSCULAR; INTRAVENOUS at 00:16

## 2023-01-16 RX ADMIN — HYDROMORPHONE HYDROCHLORIDE 0.5 MG: 1 INJECTION, SOLUTION INTRAMUSCULAR; INTRAVENOUS; SUBCUTANEOUS at 07:39

## 2023-01-16 RX ADMIN — LEVETIRACETAM 500 MG: 500 INJECTION, SOLUTION INTRAVENOUS at 08:01

## 2023-01-16 RX ADMIN — LIDOCAINE 2 PATCH: 700 PATCH TOPICAL at 08:01

## 2023-01-16 RX ADMIN — HYDROMORPHONE HYDROCHLORIDE 0.5 MG: 1 INJECTION, SOLUTION INTRAMUSCULAR; INTRAVENOUS; SUBCUTANEOUS at 22:07

## 2023-01-16 RX ADMIN — PANTOPRAZOLE SODIUM 40 MG: 40 INJECTION, POWDER, FOR SOLUTION INTRAVENOUS at 06:19

## 2023-01-16 RX ADMIN — PROPOFOL INJECTABLE EMULSION 10 MCG/KG/MIN: 10 INJECTION, EMULSION INTRAVENOUS at 01:53

## 2023-01-16 RX ADMIN — FENTANYL CITRATE 75 MCG: 50 INJECTION, SOLUTION INTRAMUSCULAR; INTRAVENOUS at 06:19

## 2023-01-16 RX ADMIN — HYDROMORPHONE HYDROCHLORIDE 0.5 MG: 1 INJECTION, SOLUTION INTRAMUSCULAR; INTRAVENOUS; SUBCUTANEOUS at 12:58

## 2023-01-17 LAB
ALBUMIN SERPL-MCNC: 2.6 G/DL (ref 3.5–5.2)
ANION GAP SERPL CALCULATED.3IONS-SCNC: 15.8 MMOL/L (ref 5–15)
BASOPHILS # BLD AUTO: 0.06 10*3/MM3 (ref 0–0.2)
BASOPHILS NFR BLD AUTO: 0.5 % (ref 0–1.5)
BUN SERPL-MCNC: 36 MG/DL (ref 6–20)
BUN/CREAT SERPL: 8.9 (ref 7–25)
CALCIUM SPEC-SCNC: 8 MG/DL (ref 8.6–10.5)
CHLORIDE SERPL-SCNC: 102 MMOL/L (ref 98–107)
CO2 SERPL-SCNC: 19.2 MMOL/L (ref 22–29)
CREAT SERPL-MCNC: 4.04 MG/DL (ref 0.57–1)
DEPRECATED RDW RBC AUTO: 46.3 FL (ref 37–54)
EGFRCR SERPLBLD CKD-EPI 2021: 12.3 ML/MIN/1.73
EOSINOPHIL # BLD AUTO: 0.21 10*3/MM3 (ref 0–0.4)
EOSINOPHIL NFR BLD AUTO: 1.8 % (ref 0.3–6.2)
ERYTHROCYTE [DISTWIDTH] IN BLOOD BY AUTOMATED COUNT: 14.4 % (ref 12.3–15.4)
GLUCOSE BLDC GLUCOMTR-MCNC: 78 MG/DL (ref 70–130)
GLUCOSE BLDC GLUCOMTR-MCNC: 83 MG/DL (ref 70–130)
GLUCOSE BLDC GLUCOMTR-MCNC: 92 MG/DL (ref 70–130)
GLUCOSE SERPL-MCNC: 76 MG/DL (ref 65–99)
HCT VFR BLD AUTO: 30.3 % (ref 34–46.6)
HGB BLD-MCNC: 8.9 G/DL (ref 12–15.9)
IMM GRANULOCYTES # BLD AUTO: 0.08 10*3/MM3 (ref 0–0.05)
IMM GRANULOCYTES NFR BLD AUTO: 0.7 % (ref 0–0.5)
LYMPHOCYTES # BLD AUTO: 0.67 10*3/MM3 (ref 0.7–3.1)
LYMPHOCYTES NFR BLD AUTO: 5.7 % (ref 19.6–45.3)
MCH RBC QN AUTO: 26.3 PG (ref 26.6–33)
MCHC RBC AUTO-ENTMCNC: 29.4 G/DL (ref 31.5–35.7)
MCV RBC AUTO: 89.4 FL (ref 79–97)
MONOCYTES # BLD AUTO: 1 10*3/MM3 (ref 0.1–0.9)
MONOCYTES NFR BLD AUTO: 8.6 % (ref 5–12)
NEUTROPHILS NFR BLD AUTO: 82.7 % (ref 42.7–76)
NEUTROPHILS NFR BLD AUTO: 9.64 10*3/MM3 (ref 1.7–7)
NRBC BLD AUTO-RTO: 0 /100 WBC (ref 0–0.2)
PHOSPHATE SERPL-MCNC: 4.7 MG/DL (ref 2.5–4.5)
PLATELET # BLD AUTO: 339 10*3/MM3 (ref 140–450)
PMV BLD AUTO: 10 FL (ref 6–12)
POTASSIUM SERPL-SCNC: 4.6 MMOL/L (ref 3.5–5.2)
RBC # BLD AUTO: 3.39 10*6/MM3 (ref 3.77–5.28)
SODIUM SERPL-SCNC: 137 MMOL/L (ref 136–145)
WBC NRBC COR # BLD: 11.66 10*3/MM3 (ref 3.4–10.8)

## 2023-01-17 PROCEDURE — 25010000002 HYDROMORPHONE PER 4 MG: Performed by: INTERNAL MEDICINE

## 2023-01-17 PROCEDURE — 85025 COMPLETE CBC W/AUTO DIFF WBC: CPT | Performed by: INTERNAL MEDICINE

## 2023-01-17 PROCEDURE — 97530 THERAPEUTIC ACTIVITIES: CPT

## 2023-01-17 PROCEDURE — 92610 EVALUATE SWALLOWING FUNCTION: CPT

## 2023-01-17 PROCEDURE — 25010000002 LEVETRIRACETAM PER 10 MG: Performed by: INTERNAL MEDICINE

## 2023-01-17 PROCEDURE — 80069 RENAL FUNCTION PANEL: CPT | Performed by: UROLOGY

## 2023-01-17 PROCEDURE — 82962 GLUCOSE BLOOD TEST: CPT

## 2023-01-17 PROCEDURE — 25010000002 EPOETIN ALFA-EPBX 10000 UNIT/ML SOLUTION: Performed by: INTERNAL MEDICINE

## 2023-01-17 PROCEDURE — 25010000002 MORPHINE PER 10 MG: Performed by: INTERNAL MEDICINE

## 2023-01-17 RX ORDER — LEVETIRACETAM 100 MG/ML
250 SOLUTION ORAL EVERY 12 HOURS SCHEDULED
Status: DISCONTINUED | OUTPATIENT
Start: 2023-01-17 | End: 2023-01-29 | Stop reason: HOSPADM

## 2023-01-17 RX ADMIN — HYDROMORPHONE HYDROCHLORIDE 0.5 MG: 1 INJECTION, SOLUTION INTRAMUSCULAR; INTRAVENOUS; SUBCUTANEOUS at 12:43

## 2023-01-17 RX ADMIN — HYDROMORPHONE HYDROCHLORIDE 0.5 MG: 1 INJECTION, SOLUTION INTRAMUSCULAR; INTRAVENOUS; SUBCUTANEOUS at 08:56

## 2023-01-17 RX ADMIN — Medication 10 ML: at 20:52

## 2023-01-17 RX ADMIN — HYDROMORPHONE HYDROCHLORIDE 0.5 MG: 1 INJECTION, SOLUTION INTRAMUSCULAR; INTRAVENOUS; SUBCUTANEOUS at 20:52

## 2023-01-17 RX ADMIN — LEVETIRACETAM 250 MG: 100 SOLUTION ORAL at 20:52

## 2023-01-17 RX ADMIN — LEVETIRACETAM 250 MG: 100 INJECTION, SOLUTION INTRAVENOUS at 08:42

## 2023-01-17 RX ADMIN — EPOETIN ALFA-EPBX 10000 UNITS: 10000 INJECTION, SOLUTION INTRAVENOUS; SUBCUTANEOUS at 08:49

## 2023-01-17 RX ADMIN — PANTOPRAZOLE SODIUM 40 MG: 40 INJECTION, POWDER, FOR SOLUTION INTRAVENOUS at 06:25

## 2023-01-17 RX ADMIN — Medication 3 ML: at 08:42

## 2023-01-17 RX ADMIN — Medication 10 ML: at 08:42

## 2023-01-17 RX ADMIN — HYDROMORPHONE HYDROCHLORIDE 0.5 MG: 1 INJECTION, SOLUTION INTRAMUSCULAR; INTRAVENOUS; SUBCUTANEOUS at 03:43

## 2023-01-17 RX ADMIN — LIDOCAINE 2 PATCH: 700 PATCH TOPICAL at 08:42

## 2023-01-17 RX ADMIN — HYDROMORPHONE HYDROCHLORIDE 0.5 MG: 1 INJECTION, SOLUTION INTRAMUSCULAR; INTRAVENOUS; SUBCUTANEOUS at 16:39

## 2023-01-17 RX ADMIN — Medication 3 ML: at 20:52

## 2023-01-17 RX ADMIN — MORPHINE SULFATE 2 MG: 2 INJECTION, SOLUTION INTRAMUSCULAR; INTRAVENOUS at 07:36

## 2023-01-18 PROBLEM — D72.829 LEUKOCYTOSIS: Status: RESOLVED | Noted: 2022-02-24 | Resolved: 2023-01-18

## 2023-01-18 LAB
ALBUMIN SERPL-MCNC: 2 G/DL (ref 3.5–5.2)
ANION GAP SERPL CALCULATED.3IONS-SCNC: 11.7 MMOL/L (ref 5–15)
BUN SERPL-MCNC: 24 MG/DL (ref 6–20)
BUN/CREAT SERPL: 7.6 (ref 7–25)
CALCIUM SPEC-SCNC: 7.9 MG/DL (ref 8.6–10.5)
CHLORIDE SERPL-SCNC: 101 MMOL/L (ref 98–107)
CO2 SERPL-SCNC: 17.3 MMOL/L (ref 22–29)
CREAT SERPL-MCNC: 3.15 MG/DL (ref 0.57–1)
DEPRECATED RDW RBC AUTO: 43.9 FL (ref 37–54)
EGFRCR SERPLBLD CKD-EPI 2021: 16.6 ML/MIN/1.73
ERYTHROCYTE [DISTWIDTH] IN BLOOD BY AUTOMATED COUNT: 14.2 % (ref 12.3–15.4)
GLUCOSE BLDC GLUCOMTR-MCNC: 107 MG/DL (ref 70–130)
GLUCOSE BLDC GLUCOMTR-MCNC: 116 MG/DL (ref 70–130)
GLUCOSE BLDC GLUCOMTR-MCNC: 147 MG/DL (ref 70–130)
GLUCOSE BLDC GLUCOMTR-MCNC: 195 MG/DL (ref 70–130)
GLUCOSE BLDC GLUCOMTR-MCNC: 215 MG/DL (ref 70–130)
GLUCOSE BLDC GLUCOMTR-MCNC: 278 MG/DL (ref 70–130)
GLUCOSE SERPL-MCNC: 138 MG/DL (ref 65–99)
HCT VFR BLD AUTO: 31.9 % (ref 34–46.6)
HGB BLD-MCNC: 10.3 G/DL (ref 12–15.9)
MCH RBC QN AUTO: 27.3 PG (ref 26.6–33)
MCHC RBC AUTO-ENTMCNC: 32.3 G/DL (ref 31.5–35.7)
MCV RBC AUTO: 84.6 FL (ref 79–97)
PHOSPHATE SERPL-MCNC: 3.2 MG/DL (ref 2.5–4.5)
PLATELET # BLD AUTO: 406 10*3/MM3 (ref 140–450)
PMV BLD AUTO: 9.6 FL (ref 6–12)
POTASSIUM SERPL-SCNC: 4.7 MMOL/L (ref 3.5–5.2)
RBC # BLD AUTO: 3.77 10*6/MM3 (ref 3.77–5.28)
SODIUM SERPL-SCNC: 130 MMOL/L (ref 136–145)
WBC NRBC COR # BLD: 8.5 10*3/MM3 (ref 3.4–10.8)

## 2023-01-18 PROCEDURE — 63710000001 INSULIN LISPRO (HUMAN) PER 5 UNITS: Performed by: STUDENT IN AN ORGANIZED HEALTH CARE EDUCATION/TRAINING PROGRAM

## 2023-01-18 PROCEDURE — 85027 COMPLETE CBC AUTOMATED: CPT | Performed by: INTERNAL MEDICINE

## 2023-01-18 PROCEDURE — 80069 RENAL FUNCTION PANEL: CPT | Performed by: INTERNAL MEDICINE

## 2023-01-18 PROCEDURE — 82962 GLUCOSE BLOOD TEST: CPT

## 2023-01-18 PROCEDURE — 92526 ORAL FUNCTION THERAPY: CPT

## 2023-01-18 PROCEDURE — 25010000002 HYDROMORPHONE PER 4 MG: Performed by: INTERNAL MEDICINE

## 2023-01-18 PROCEDURE — 25010000002 MORPHINE PER 10 MG: Performed by: INTERNAL MEDICINE

## 2023-01-18 RX ORDER — PANTOPRAZOLE SODIUM 40 MG/1
40 TABLET, DELAYED RELEASE ORAL
Status: DISCONTINUED | OUTPATIENT
Start: 2023-01-19 | End: 2023-01-29 | Stop reason: HOSPADM

## 2023-01-18 RX ORDER — NICOTINE POLACRILEX 4 MG
15 LOZENGE BUCCAL
Status: DISCONTINUED | OUTPATIENT
Start: 2023-01-18 | End: 2023-01-29 | Stop reason: HOSPADM

## 2023-01-18 RX ORDER — DEXTROSE MONOHYDRATE 25 G/50ML
25 INJECTION, SOLUTION INTRAVENOUS
Status: DISCONTINUED | OUTPATIENT
Start: 2023-01-18 | End: 2023-01-29 | Stop reason: HOSPADM

## 2023-01-18 RX ORDER — INSULIN LISPRO 100 [IU]/ML
0-7 INJECTION, SOLUTION INTRAVENOUS; SUBCUTANEOUS
Status: DISCONTINUED | OUTPATIENT
Start: 2023-01-18 | End: 2023-01-29 | Stop reason: HOSPADM

## 2023-01-18 RX ADMIN — MORPHINE SULFATE 2 MG: 2 INJECTION, SOLUTION INTRAMUSCULAR; INTRAVENOUS at 00:50

## 2023-01-18 RX ADMIN — HYDROMORPHONE HYDROCHLORIDE 0.5 MG: 1 INJECTION, SOLUTION INTRAMUSCULAR; INTRAVENOUS; SUBCUTANEOUS at 20:30

## 2023-01-18 RX ADMIN — OXYCODONE HYDROCHLORIDE AND ACETAMINOPHEN 1 TABLET: 7.5; 325 TABLET ORAL at 15:20

## 2023-01-18 RX ADMIN — HYDROMORPHONE HYDROCHLORIDE 0.5 MG: 1 INJECTION, SOLUTION INTRAMUSCULAR; INTRAVENOUS; SUBCUTANEOUS at 08:51

## 2023-01-18 RX ADMIN — PANTOPRAZOLE SODIUM 40 MG: 40 INJECTION, POWDER, FOR SOLUTION INTRAVENOUS at 08:51

## 2023-01-18 RX ADMIN — HYDROMORPHONE HYDROCHLORIDE 0.5 MG: 1 INJECTION, SOLUTION INTRAMUSCULAR; INTRAVENOUS; SUBCUTANEOUS at 04:14

## 2023-01-18 RX ADMIN — LEVETIRACETAM 250 MG: 100 SOLUTION ORAL at 10:26

## 2023-01-18 RX ADMIN — ZINC OXIDE 1 APPLICATION: 200 OINTMENT TOPICAL at 20:29

## 2023-01-18 RX ADMIN — ZINC OXIDE 1 APPLICATION: 200 OINTMENT TOPICAL at 15:20

## 2023-01-18 RX ADMIN — Medication 3 ML: at 20:29

## 2023-01-18 RX ADMIN — LIDOCAINE 2 PATCH: 700 PATCH TOPICAL at 08:52

## 2023-01-18 RX ADMIN — LEVETIRACETAM 250 MG: 100 SOLUTION ORAL at 20:29

## 2023-01-18 RX ADMIN — Medication 10 ML: at 08:51

## 2023-01-18 RX ADMIN — INSULIN LISPRO 4 UNITS: 100 INJECTION, SOLUTION INTRAVENOUS; SUBCUTANEOUS at 17:56

## 2023-01-18 RX ADMIN — Medication 3 ML: at 10:29

## 2023-01-18 RX ADMIN — AMLODIPINE BESYLATE 10 MG: 10 TABLET ORAL at 08:51

## 2023-01-18 RX ADMIN — FOLIC ACID 1 MG: 1 TABLET ORAL at 08:51

## 2023-01-18 RX ADMIN — HYDROMORPHONE HYDROCHLORIDE 0.5 MG: 1 INJECTION, SOLUTION INTRAMUSCULAR; INTRAVENOUS; SUBCUTANEOUS at 00:11

## 2023-01-18 RX ADMIN — Medication 10 ML: at 20:29

## 2023-01-19 LAB
ALBUMIN SERPL-MCNC: 2.3 G/DL (ref 3.5–5.2)
ANION GAP SERPL CALCULATED.3IONS-SCNC: 7.7 MMOL/L (ref 5–15)
BUN SERPL-MCNC: 31 MG/DL (ref 6–20)
BUN/CREAT SERPL: 8.7 (ref 7–25)
CALCIUM SPEC-SCNC: 7.8 MG/DL (ref 8.6–10.5)
CHLORIDE SERPL-SCNC: 99 MMOL/L (ref 98–107)
CO2 SERPL-SCNC: 24.3 MMOL/L (ref 22–29)
CREAT SERPL-MCNC: 3.58 MG/DL (ref 0.57–1)
DEPRECATED RDW RBC AUTO: 44.6 FL (ref 37–54)
EGFRCR SERPLBLD CKD-EPI 2021: 14.3 ML/MIN/1.73
ERYTHROCYTE [DISTWIDTH] IN BLOOD BY AUTOMATED COUNT: 14.3 % (ref 12.3–15.4)
GLUCOSE BLDC GLUCOMTR-MCNC: 152 MG/DL (ref 70–130)
GLUCOSE BLDC GLUCOMTR-MCNC: 169 MG/DL (ref 70–130)
GLUCOSE BLDC GLUCOMTR-MCNC: 236 MG/DL (ref 70–130)
GLUCOSE BLDC GLUCOMTR-MCNC: 253 MG/DL (ref 70–130)
GLUCOSE BLDC GLUCOMTR-MCNC: 314 MG/DL (ref 70–130)
GLUCOSE SERPL-MCNC: 333 MG/DL (ref 65–99)
HCT VFR BLD AUTO: 32.7 % (ref 34–46.6)
HGB BLD-MCNC: 10.2 G/DL (ref 12–15.9)
MAGNESIUM SERPL-MCNC: 1.7 MG/DL (ref 1.6–2.6)
MCH RBC QN AUTO: 26.6 PG (ref 26.6–33)
MCHC RBC AUTO-ENTMCNC: 31.2 G/DL (ref 31.5–35.7)
MCV RBC AUTO: 85.4 FL (ref 79–97)
PHOSPHATE SERPL-MCNC: 2.4 MG/DL (ref 2.5–4.5)
PLATELET # BLD AUTO: 400 10*3/MM3 (ref 140–450)
PMV BLD AUTO: 9.4 FL (ref 6–12)
POTASSIUM SERPL-SCNC: 3.6 MMOL/L (ref 3.5–5.2)
RBC # BLD AUTO: 3.83 10*6/MM3 (ref 3.77–5.28)
SODIUM SERPL-SCNC: 131 MMOL/L (ref 136–145)
WBC NRBC COR # BLD: 8.44 10*3/MM3 (ref 3.4–10.8)

## 2023-01-19 PROCEDURE — 82962 GLUCOSE BLOOD TEST: CPT

## 2023-01-19 PROCEDURE — 83735 ASSAY OF MAGNESIUM: CPT | Performed by: STUDENT IN AN ORGANIZED HEALTH CARE EDUCATION/TRAINING PROGRAM

## 2023-01-19 PROCEDURE — 87040 BLOOD CULTURE FOR BACTERIA: CPT | Performed by: HOSPITALIST

## 2023-01-19 PROCEDURE — 63710000001 INSULIN LISPRO (HUMAN) PER 5 UNITS: Performed by: STUDENT IN AN ORGANIZED HEALTH CARE EDUCATION/TRAINING PROGRAM

## 2023-01-19 PROCEDURE — 25010000002 MORPHINE PER 10 MG: Performed by: INTERNAL MEDICINE

## 2023-01-19 PROCEDURE — 25010000002 EPOETIN ALFA-EPBX 10000 UNIT/ML SOLUTION: Performed by: INTERNAL MEDICINE

## 2023-01-19 PROCEDURE — 85027 COMPLETE CBC AUTOMATED: CPT | Performed by: INTERNAL MEDICINE

## 2023-01-19 PROCEDURE — 25010000002 HYDROMORPHONE PER 4 MG: Performed by: INTERNAL MEDICINE

## 2023-01-19 PROCEDURE — 80069 RENAL FUNCTION PANEL: CPT | Performed by: INTERNAL MEDICINE

## 2023-01-19 RX ADMIN — LEVOTHYROXINE SODIUM 200 MCG: 0.1 TABLET ORAL at 05:04

## 2023-01-19 RX ADMIN — INSULIN LISPRO 2 UNITS: 100 INJECTION, SOLUTION INTRAVENOUS; SUBCUTANEOUS at 16:56

## 2023-01-19 RX ADMIN — EPOETIN ALFA-EPBX 10000 UNITS: 10000 INJECTION, SOLUTION INTRAVENOUS; SUBCUTANEOUS at 14:38

## 2023-01-19 RX ADMIN — HYDROMORPHONE HYDROCHLORIDE 0.5 MG: 1 INJECTION, SOLUTION INTRAMUSCULAR; INTRAVENOUS; SUBCUTANEOUS at 19:50

## 2023-01-19 RX ADMIN — ZINC OXIDE 1 APPLICATION: 200 OINTMENT TOPICAL at 08:40

## 2023-01-19 RX ADMIN — LEVETIRACETAM 250 MG: 100 SOLUTION ORAL at 20:03

## 2023-01-19 RX ADMIN — MORPHINE SULFATE 2 MG: 2 INJECTION, SOLUTION INTRAMUSCULAR; INTRAVENOUS at 05:03

## 2023-01-19 RX ADMIN — ACETAMINOPHEN 650 MG: 325 SUSPENSION ORAL at 04:17

## 2023-01-19 RX ADMIN — ZINC OXIDE 1 APPLICATION: 200 OINTMENT TOPICAL at 16:51

## 2023-01-19 RX ADMIN — Medication 10 ML: at 20:03

## 2023-01-19 RX ADMIN — HYDROMORPHONE HYDROCHLORIDE 0.5 MG: 1 INJECTION, SOLUTION INTRAMUSCULAR; INTRAVENOUS; SUBCUTANEOUS at 03:35

## 2023-01-19 RX ADMIN — INSULIN LISPRO 5 UNITS: 100 INJECTION, SOLUTION INTRAVENOUS; SUBCUTANEOUS at 08:40

## 2023-01-19 RX ADMIN — Medication 3 ML: at 20:03

## 2023-01-19 RX ADMIN — ACETAMINOPHEN 650 MG: 325 SUSPENSION ORAL at 20:56

## 2023-01-19 RX ADMIN — FOLIC ACID 1 MG: 1 TABLET ORAL at 08:40

## 2023-01-19 RX ADMIN — INSULIN GLARGINE-YFGN 5 UNITS: 100 INJECTION, SOLUTION SUBCUTANEOUS at 21:01

## 2023-01-19 RX ADMIN — Medication 3 ML: at 08:39

## 2023-01-19 RX ADMIN — AMLODIPINE BESYLATE 10 MG: 10 TABLET ORAL at 08:40

## 2023-01-19 RX ADMIN — MORPHINE SULFATE 2 MG: 2 INJECTION, SOLUTION INTRAMUSCULAR; INTRAVENOUS at 16:55

## 2023-01-19 RX ADMIN — INSULIN LISPRO 4 UNITS: 100 INJECTION, SOLUTION INTRAVENOUS; SUBCUTANEOUS at 11:38

## 2023-01-19 RX ADMIN — HYDROMORPHONE HYDROCHLORIDE 0.5 MG: 1 INJECTION, SOLUTION INTRAMUSCULAR; INTRAVENOUS; SUBCUTANEOUS at 08:39

## 2023-01-19 RX ADMIN — ZINC OXIDE 1 APPLICATION: 200 OINTMENT TOPICAL at 20:03

## 2023-01-19 RX ADMIN — LEVETIRACETAM 250 MG: 100 SOLUTION ORAL at 08:40

## 2023-01-19 RX ADMIN — PANTOPRAZOLE SODIUM 40 MG: 40 TABLET, DELAYED RELEASE ORAL at 05:04

## 2023-01-19 RX ADMIN — HYDROMORPHONE HYDROCHLORIDE 0.5 MG: 1 INJECTION, SOLUTION INTRAMUSCULAR; INTRAVENOUS; SUBCUTANEOUS at 14:29

## 2023-01-19 RX ADMIN — Medication 10 ML: at 08:41

## 2023-01-19 RX ADMIN — HYDROMORPHONE HYDROCHLORIDE 0.5 MG: 1 INJECTION, SOLUTION INTRAMUSCULAR; INTRAVENOUS; SUBCUTANEOUS at 11:41

## 2023-01-20 LAB
ALBUMIN SERPL-MCNC: 2.8 G/DL (ref 3.5–5.2)
ANION GAP SERPL CALCULATED.3IONS-SCNC: 7.1 MMOL/L (ref 5–15)
BUN SERPL-MCNC: 29 MG/DL (ref 6–20)
BUN/CREAT SERPL: 9.7 (ref 7–25)
CALCIUM SPEC-SCNC: 8.1 MG/DL (ref 8.6–10.5)
CHLORIDE SERPL-SCNC: 96 MMOL/L (ref 98–107)
CO2 SERPL-SCNC: 24.9 MMOL/L (ref 22–29)
CREAT SERPL-MCNC: 2.98 MG/DL (ref 0.57–1)
DEPRECATED RDW RBC AUTO: 45 FL (ref 37–54)
EGFRCR SERPLBLD CKD-EPI 2021: 17.8 ML/MIN/1.73
ERYTHROCYTE [DISTWIDTH] IN BLOOD BY AUTOMATED COUNT: 14.3 % (ref 12.3–15.4)
GLUCOSE BLDC GLUCOMTR-MCNC: 111 MG/DL (ref 70–130)
GLUCOSE BLDC GLUCOMTR-MCNC: 118 MG/DL (ref 70–130)
GLUCOSE BLDC GLUCOMTR-MCNC: 186 MG/DL (ref 70–130)
GLUCOSE BLDC GLUCOMTR-MCNC: 244 MG/DL (ref 70–130)
GLUCOSE BLDC GLUCOMTR-MCNC: 248 MG/DL (ref 70–130)
GLUCOSE BLDC GLUCOMTR-MCNC: 73 MG/DL (ref 70–130)
GLUCOSE BLDC GLUCOMTR-MCNC: 78 MG/DL (ref 70–130)
GLUCOSE SERPL-MCNC: 270 MG/DL (ref 65–99)
HCT VFR BLD AUTO: 32.7 % (ref 34–46.6)
HGB BLD-MCNC: 10.3 G/DL (ref 12–15.9)
MAGNESIUM SERPL-MCNC: 1.7 MG/DL (ref 1.6–2.6)
MCH RBC QN AUTO: 27.1 PG (ref 26.6–33)
MCHC RBC AUTO-ENTMCNC: 31.5 G/DL (ref 31.5–35.7)
MCV RBC AUTO: 86.1 FL (ref 79–97)
PHOSPHATE SERPL-MCNC: 1.4 MG/DL (ref 2.5–4.5)
PLATELET # BLD AUTO: 368 10*3/MM3 (ref 140–450)
PMV BLD AUTO: 9.7 FL (ref 6–12)
POTASSIUM SERPL-SCNC: 4.1 MMOL/L (ref 3.5–5.2)
RBC # BLD AUTO: 3.8 10*6/MM3 (ref 3.77–5.28)
SODIUM SERPL-SCNC: 128 MMOL/L (ref 136–145)
WBC NRBC COR # BLD: 9.13 10*3/MM3 (ref 3.4–10.8)

## 2023-01-20 PROCEDURE — 97530 THERAPEUTIC ACTIVITIES: CPT

## 2023-01-20 PROCEDURE — 85027 COMPLETE CBC AUTOMATED: CPT | Performed by: INTERNAL MEDICINE

## 2023-01-20 PROCEDURE — 92610 EVALUATE SWALLOWING FUNCTION: CPT

## 2023-01-20 PROCEDURE — 25010000002 MORPHINE PER 10 MG: Performed by: INTERNAL MEDICINE

## 2023-01-20 PROCEDURE — 80069 RENAL FUNCTION PANEL: CPT | Performed by: INTERNAL MEDICINE

## 2023-01-20 PROCEDURE — 63710000001 INSULIN LISPRO (HUMAN) PER 5 UNITS: Performed by: STUDENT IN AN ORGANIZED HEALTH CARE EDUCATION/TRAINING PROGRAM

## 2023-01-20 PROCEDURE — 97110 THERAPEUTIC EXERCISES: CPT

## 2023-01-20 PROCEDURE — 83735 ASSAY OF MAGNESIUM: CPT | Performed by: STUDENT IN AN ORGANIZED HEALTH CARE EDUCATION/TRAINING PROGRAM

## 2023-01-20 PROCEDURE — 92526 ORAL FUNCTION THERAPY: CPT

## 2023-01-20 PROCEDURE — 25010000002 HYDROMORPHONE PER 4 MG: Performed by: INTERNAL MEDICINE

## 2023-01-20 PROCEDURE — 82962 GLUCOSE BLOOD TEST: CPT

## 2023-01-20 RX ORDER — MANNITOL 250 MG/ML
12.5 INJECTION, SOLUTION INTRAVENOUS AS NEEDED
Status: CANCELLED | OUTPATIENT
Start: 2023-01-21 | End: 2023-01-21

## 2023-01-20 RX ADMIN — MORPHINE SULFATE 2 MG: 2 INJECTION, SOLUTION INTRAMUSCULAR; INTRAVENOUS at 19:49

## 2023-01-20 RX ADMIN — LEVOTHYROXINE SODIUM 200 MCG: 0.1 TABLET ORAL at 09:36

## 2023-01-20 RX ADMIN — Medication 3 ML: at 20:09

## 2023-01-20 RX ADMIN — ZINC OXIDE 1 APPLICATION: 200 OINTMENT TOPICAL at 09:40

## 2023-01-20 RX ADMIN — LEVETIRACETAM 250 MG: 100 SOLUTION ORAL at 20:08

## 2023-01-20 RX ADMIN — FOLIC ACID 1 MG: 1 TABLET ORAL at 09:36

## 2023-01-20 RX ADMIN — ZINC OXIDE 1 APPLICATION: 200 OINTMENT TOPICAL at 17:24

## 2023-01-20 RX ADMIN — HYDROMORPHONE HYDROCHLORIDE 0.5 MG: 1 INJECTION, SOLUTION INTRAMUSCULAR; INTRAVENOUS; SUBCUTANEOUS at 08:57

## 2023-01-20 RX ADMIN — DIBASIC SODIUM PHOSPHATE, MONOBASIC POTASSIUM PHOSPHATE AND MONOBASIC SODIUM PHOSPHATE 2 TABLET: 852; 155; 130 TABLET ORAL at 12:18

## 2023-01-20 RX ADMIN — MORPHINE SULFATE 2 MG: 2 INJECTION, SOLUTION INTRAMUSCULAR; INTRAVENOUS at 12:13

## 2023-01-20 RX ADMIN — DIBASIC SODIUM PHOSPHATE, MONOBASIC POTASSIUM PHOSPHATE AND MONOBASIC SODIUM PHOSPHATE 2 TABLET: 852; 155; 130 TABLET ORAL at 17:24

## 2023-01-20 RX ADMIN — Medication 10 ML: at 09:38

## 2023-01-20 RX ADMIN — ACETAMINOPHEN 650 MG: 325 SUSPENSION ORAL at 21:10

## 2023-01-20 RX ADMIN — HYDROMORPHONE HYDROCHLORIDE 0.5 MG: 1 INJECTION, SOLUTION INTRAMUSCULAR; INTRAVENOUS; SUBCUTANEOUS at 00:09

## 2023-01-20 RX ADMIN — MORPHINE SULFATE 2 MG: 2 INJECTION, SOLUTION INTRAMUSCULAR; INTRAVENOUS at 02:14

## 2023-01-20 RX ADMIN — INSULIN LISPRO 3 UNITS: 100 INJECTION, SOLUTION INTRAVENOUS; SUBCUTANEOUS at 09:40

## 2023-01-20 RX ADMIN — PANTOPRAZOLE SODIUM 40 MG: 40 TABLET, DELAYED RELEASE ORAL at 09:36

## 2023-01-20 RX ADMIN — Medication 10 ML: at 20:09

## 2023-01-20 RX ADMIN — INSULIN GLARGINE-YFGN 5 UNITS: 100 INJECTION, SOLUTION SUBCUTANEOUS at 21:05

## 2023-01-20 RX ADMIN — LEVETIRACETAM 250 MG: 100 SOLUTION ORAL at 09:37

## 2023-01-20 RX ADMIN — ZINC OXIDE 1 APPLICATION: 200 OINTMENT TOPICAL at 20:09

## 2023-01-20 RX ADMIN — LIDOCAINE 2 PATCH: 700 PATCH TOPICAL at 09:36

## 2023-01-20 RX ADMIN — HYDROMORPHONE HYDROCHLORIDE 0.5 MG: 1 INJECTION, SOLUTION INTRAMUSCULAR; INTRAVENOUS; SUBCUTANEOUS at 22:38

## 2023-01-20 RX ADMIN — AMLODIPINE BESYLATE 10 MG: 10 TABLET ORAL at 09:36

## 2023-01-21 LAB
ALBUMIN SERPL-MCNC: 2.5 G/DL (ref 3.5–5.2)
ANION GAP SERPL CALCULATED.3IONS-SCNC: 11.7 MMOL/L (ref 5–15)
BASOPHILS # BLD AUTO: 0.07 10*3/MM3 (ref 0–0.2)
BASOPHILS NFR BLD AUTO: 0.7 % (ref 0–1.5)
BUN SERPL-MCNC: 39 MG/DL (ref 6–20)
BUN/CREAT SERPL: 10.3 (ref 7–25)
CALCIUM SPEC-SCNC: 8 MG/DL (ref 8.6–10.5)
CHLORIDE SERPL-SCNC: 98 MMOL/L (ref 98–107)
CO2 SERPL-SCNC: 23.3 MMOL/L (ref 22–29)
CREAT SERPL-MCNC: 3.8 MG/DL (ref 0.57–1)
DEPRECATED RDW RBC AUTO: 45.7 FL (ref 37–54)
EGFRCR SERPLBLD CKD-EPI 2021: 13.3 ML/MIN/1.73
EOSINOPHIL # BLD AUTO: 0.14 10*3/MM3 (ref 0–0.4)
EOSINOPHIL NFR BLD AUTO: 1.4 % (ref 0.3–6.2)
ERYTHROCYTE [DISTWIDTH] IN BLOOD BY AUTOMATED COUNT: 14.5 % (ref 12.3–15.4)
GLUCOSE BLDC GLUCOMTR-MCNC: 103 MG/DL (ref 70–130)
GLUCOSE BLDC GLUCOMTR-MCNC: 174 MG/DL (ref 70–130)
GLUCOSE BLDC GLUCOMTR-MCNC: 223 MG/DL (ref 70–130)
GLUCOSE SERPL-MCNC: 235 MG/DL (ref 65–99)
HCT VFR BLD AUTO: 32.5 % (ref 34–46.6)
HGB BLD-MCNC: 10.2 G/DL (ref 12–15.9)
IMM GRANULOCYTES # BLD AUTO: 0.25 10*3/MM3 (ref 0–0.05)
IMM GRANULOCYTES NFR BLD AUTO: 2.6 % (ref 0–0.5)
LYMPHOCYTES # BLD AUTO: 1.23 10*3/MM3 (ref 0.7–3.1)
LYMPHOCYTES NFR BLD AUTO: 12.7 % (ref 19.6–45.3)
MAGNESIUM SERPL-MCNC: 1.6 MG/DL (ref 1.6–2.6)
MCH RBC QN AUTO: 26.9 PG (ref 26.6–33)
MCHC RBC AUTO-ENTMCNC: 31.4 G/DL (ref 31.5–35.7)
MCV RBC AUTO: 85.8 FL (ref 79–97)
MONOCYTES # BLD AUTO: 1.58 10*3/MM3 (ref 0.1–0.9)
MONOCYTES NFR BLD AUTO: 16.3 % (ref 5–12)
NEUTROPHILS NFR BLD AUTO: 6.42 10*3/MM3 (ref 1.7–7)
NEUTROPHILS NFR BLD AUTO: 66.3 % (ref 42.7–76)
NRBC BLD AUTO-RTO: 0 /100 WBC (ref 0–0.2)
PHOSPHATE SERPL-MCNC: 2.6 MG/DL (ref 2.5–4.5)
PLATELET # BLD AUTO: 360 10*3/MM3 (ref 140–450)
PMV BLD AUTO: 9.8 FL (ref 6–12)
POTASSIUM SERPL-SCNC: 4.2 MMOL/L (ref 3.5–5.2)
RBC # BLD AUTO: 3.79 10*6/MM3 (ref 3.77–5.28)
SODIUM SERPL-SCNC: 133 MMOL/L (ref 136–145)
WBC NRBC COR # BLD: 9.69 10*3/MM3 (ref 3.4–10.8)

## 2023-01-21 PROCEDURE — 63710000001 INSULIN LISPRO (HUMAN) PER 5 UNITS: Performed by: STUDENT IN AN ORGANIZED HEALTH CARE EDUCATION/TRAINING PROGRAM

## 2023-01-21 PROCEDURE — 82962 GLUCOSE BLOOD TEST: CPT

## 2023-01-21 PROCEDURE — 25010000002 MORPHINE PER 10 MG: Performed by: INTERNAL MEDICINE

## 2023-01-21 PROCEDURE — 80069 RENAL FUNCTION PANEL: CPT | Performed by: INTERNAL MEDICINE

## 2023-01-21 PROCEDURE — 83735 ASSAY OF MAGNESIUM: CPT | Performed by: STUDENT IN AN ORGANIZED HEALTH CARE EDUCATION/TRAINING PROGRAM

## 2023-01-21 PROCEDURE — 85025 COMPLETE CBC W/AUTO DIFF WBC: CPT | Performed by: INTERNAL MEDICINE

## 2023-01-21 PROCEDURE — 25010000002 HYDROMORPHONE PER 4 MG: Performed by: INTERNAL MEDICINE

## 2023-01-21 RX ORDER — MAGNESIUM SULFATE HEPTAHYDRATE 40 MG/ML
2 INJECTION, SOLUTION INTRAVENOUS ONCE
Status: COMPLETED | OUTPATIENT
Start: 2023-01-21 | End: 2023-01-22

## 2023-01-21 RX ADMIN — LIDOCAINE 2 PATCH: 700 PATCH TOPICAL at 12:35

## 2023-01-21 RX ADMIN — INSULIN LISPRO 3 UNITS: 100 INJECTION, SOLUTION INTRAVENOUS; SUBCUTANEOUS at 06:53

## 2023-01-21 RX ADMIN — Medication 10 ML: at 12:40

## 2023-01-21 RX ADMIN — LEVETIRACETAM 250 MG: 100 SOLUTION ORAL at 21:16

## 2023-01-21 RX ADMIN — ZINC OXIDE 1 APPLICATION: 200 OINTMENT TOPICAL at 17:44

## 2023-01-21 RX ADMIN — FOLIC ACID 1 MG: 1 TABLET ORAL at 12:34

## 2023-01-21 RX ADMIN — LEVOTHYROXINE SODIUM 200 MCG: 0.1 TABLET ORAL at 05:03

## 2023-01-21 RX ADMIN — AMLODIPINE BESYLATE 10 MG: 10 TABLET ORAL at 12:34

## 2023-01-21 RX ADMIN — ZINC OXIDE 1 APPLICATION: 200 OINTMENT TOPICAL at 21:17

## 2023-01-21 RX ADMIN — Medication 3 ML: at 08:44

## 2023-01-21 RX ADMIN — Medication 3 ML: at 21:17

## 2023-01-21 RX ADMIN — MORPHINE SULFATE 2 MG: 2 INJECTION, SOLUTION INTRAMUSCULAR; INTRAVENOUS at 17:44

## 2023-01-21 RX ADMIN — INSULIN GLARGINE-YFGN 5 UNITS: 100 INJECTION, SOLUTION SUBCUTANEOUS at 21:16

## 2023-01-21 RX ADMIN — LEVETIRACETAM 250 MG: 100 SOLUTION ORAL at 12:30

## 2023-01-21 RX ADMIN — MORPHINE SULFATE 2 MG: 2 INJECTION, SOLUTION INTRAMUSCULAR; INTRAVENOUS at 12:30

## 2023-01-21 RX ADMIN — PANTOPRAZOLE SODIUM 40 MG: 40 TABLET, DELAYED RELEASE ORAL at 05:03

## 2023-01-21 RX ADMIN — ZINC OXIDE 1 APPLICATION: 200 OINTMENT TOPICAL at 12:35

## 2023-01-21 RX ADMIN — MORPHINE SULFATE 2 MG: 2 INJECTION, SOLUTION INTRAMUSCULAR; INTRAVENOUS at 04:56

## 2023-01-21 RX ADMIN — MORPHINE SULFATE 2 MG: 2 INJECTION, SOLUTION INTRAMUSCULAR; INTRAVENOUS at 21:42

## 2023-01-21 RX ADMIN — HYDROMORPHONE HYDROCHLORIDE 0.5 MG: 1 INJECTION, SOLUTION INTRAMUSCULAR; INTRAVENOUS; SUBCUTANEOUS at 08:44

## 2023-01-21 RX ADMIN — Medication 10 ML: at 21:17

## 2023-01-22 ENCOUNTER — APPOINTMENT (OUTPATIENT)
Dept: GENERAL RADIOLOGY | Facility: HOSPITAL | Age: 58
DRG: 270 | End: 2023-01-22
Payer: MEDICARE

## 2023-01-22 LAB
ALBUMIN SERPL-MCNC: 2.4 G/DL (ref 3.5–5.2)
ANION GAP SERPL CALCULATED.3IONS-SCNC: 10.4 MMOL/L (ref 5–15)
BUN SERPL-MCNC: 29 MG/DL (ref 6–20)
BUN/CREAT SERPL: 9.5 (ref 7–25)
CALCIUM SPEC-SCNC: 8.1 MG/DL (ref 8.6–10.5)
CHLORIDE SERPL-SCNC: 98 MMOL/L (ref 98–107)
CO2 SERPL-SCNC: 23.6 MMOL/L (ref 22–29)
CREAT SERPL-MCNC: 3.06 MG/DL (ref 0.57–1)
DEPRECATED RDW RBC AUTO: 44.5 FL (ref 37–54)
EGFRCR SERPLBLD CKD-EPI 2021: 17.2 ML/MIN/1.73
ERYTHROCYTE [DISTWIDTH] IN BLOOD BY AUTOMATED COUNT: 14.4 % (ref 12.3–15.4)
GLUCOSE BLDC GLUCOMTR-MCNC: 105 MG/DL (ref 70–130)
GLUCOSE BLDC GLUCOMTR-MCNC: 111 MG/DL (ref 70–130)
GLUCOSE BLDC GLUCOMTR-MCNC: 233 MG/DL (ref 70–130)
GLUCOSE BLDC GLUCOMTR-MCNC: 247 MG/DL (ref 70–130)
GLUCOSE SERPL-MCNC: 271 MG/DL (ref 65–99)
HCT VFR BLD AUTO: 30.2 % (ref 34–46.6)
HGB BLD-MCNC: 9.5 G/DL (ref 12–15.9)
MAGNESIUM SERPL-MCNC: 2 MG/DL (ref 1.6–2.6)
MCH RBC QN AUTO: 26.8 PG (ref 26.6–33)
MCHC RBC AUTO-ENTMCNC: 31.5 G/DL (ref 31.5–35.7)
MCV RBC AUTO: 85.1 FL (ref 79–97)
PHOSPHATE SERPL-MCNC: 2.6 MG/DL (ref 2.5–4.5)
PLATELET # BLD AUTO: 317 10*3/MM3 (ref 140–450)
PMV BLD AUTO: 10.1 FL (ref 6–12)
POTASSIUM SERPL-SCNC: 4.1 MMOL/L (ref 3.5–5.2)
QT INTERVAL: 372 MS
RBC # BLD AUTO: 3.55 10*6/MM3 (ref 3.77–5.28)
SODIUM SERPL-SCNC: 132 MMOL/L (ref 136–145)
TROPONIN T SERPL-MCNC: 0.49 NG/ML (ref 0–0.03)
WBC NRBC COR # BLD: 9.55 10*3/MM3 (ref 3.4–10.8)

## 2023-01-22 PROCEDURE — 93005 ELECTROCARDIOGRAM TRACING: CPT | Performed by: HOSPITALIST

## 2023-01-22 PROCEDURE — 63710000001 INSULIN LISPRO (HUMAN) PER 5 UNITS: Performed by: STUDENT IN AN ORGANIZED HEALTH CARE EDUCATION/TRAINING PROGRAM

## 2023-01-22 PROCEDURE — 25010000002 MORPHINE PER 10 MG: Performed by: INTERNAL MEDICINE

## 2023-01-22 PROCEDURE — 93010 ELECTROCARDIOGRAM REPORT: CPT | Performed by: INTERNAL MEDICINE

## 2023-01-22 PROCEDURE — 84484 ASSAY OF TROPONIN QUANT: CPT | Performed by: HOSPITALIST

## 2023-01-22 PROCEDURE — 83735 ASSAY OF MAGNESIUM: CPT | Performed by: STUDENT IN AN ORGANIZED HEALTH CARE EDUCATION/TRAINING PROGRAM

## 2023-01-22 PROCEDURE — 82962 GLUCOSE BLOOD TEST: CPT

## 2023-01-22 PROCEDURE — 25010000002 ONDANSETRON PER 1 MG: Performed by: INTERNAL MEDICINE

## 2023-01-22 PROCEDURE — 85027 COMPLETE CBC AUTOMATED: CPT | Performed by: INTERNAL MEDICINE

## 2023-01-22 PROCEDURE — 80069 RENAL FUNCTION PANEL: CPT | Performed by: INTERNAL MEDICINE

## 2023-01-22 PROCEDURE — 71111 X-RAY EXAM RIBS/CHEST4/> VWS: CPT

## 2023-01-22 PROCEDURE — 25010000002 MAGNESIUM SULFATE 2 GM/50ML SOLUTION: Performed by: INTERNAL MEDICINE

## 2023-01-22 RX ORDER — SERTRALINE HYDROCHLORIDE 100 MG/1
100 TABLET, FILM COATED ORAL DAILY
Status: DISCONTINUED | OUTPATIENT
Start: 2023-01-22 | End: 2023-01-29 | Stop reason: HOSPADM

## 2023-01-22 RX ADMIN — Medication 10 ML: at 08:36

## 2023-01-22 RX ADMIN — INSULIN GLARGINE-YFGN 5 UNITS: 100 INJECTION, SOLUTION SUBCUTANEOUS at 22:58

## 2023-01-22 RX ADMIN — PANTOPRAZOLE SODIUM 40 MG: 40 TABLET, DELAYED RELEASE ORAL at 06:11

## 2023-01-22 RX ADMIN — AMLODIPINE BESYLATE 10 MG: 10 TABLET ORAL at 10:20

## 2023-01-22 RX ADMIN — SERTRALINE 100 MG: 100 TABLET, FILM COATED ORAL at 16:17

## 2023-01-22 RX ADMIN — LIDOCAINE 2 PATCH: 700 PATCH TOPICAL at 10:21

## 2023-01-22 RX ADMIN — ZINC OXIDE 1 APPLICATION: 200 OINTMENT TOPICAL at 10:21

## 2023-01-22 RX ADMIN — Medication 10 ML: at 20:54

## 2023-01-22 RX ADMIN — MORPHINE SULFATE 2 MG: 2 INJECTION, SOLUTION INTRAMUSCULAR; INTRAVENOUS at 08:35

## 2023-01-22 RX ADMIN — MAGNESIUM SULFATE HEPTAHYDRATE 2 G: 2 INJECTION, SOLUTION INTRAVENOUS at 00:14

## 2023-01-22 RX ADMIN — FOLIC ACID 1 MG: 1 TABLET ORAL at 10:20

## 2023-01-22 RX ADMIN — MORPHINE SULFATE 2 MG: 2 INJECTION, SOLUTION INTRAMUSCULAR; INTRAVENOUS at 13:30

## 2023-01-22 RX ADMIN — INSULIN LISPRO 3 UNITS: 100 INJECTION, SOLUTION INTRAVENOUS; SUBCUTANEOUS at 13:31

## 2023-01-22 RX ADMIN — LEVETIRACETAM 250 MG: 100 SOLUTION ORAL at 20:53

## 2023-01-22 RX ADMIN — OXYCODONE HYDROCHLORIDE AND ACETAMINOPHEN 1 TABLET: 7.5; 325 TABLET ORAL at 23:07

## 2023-01-22 RX ADMIN — LEVETIRACETAM 250 MG: 100 SOLUTION ORAL at 10:20

## 2023-01-22 RX ADMIN — ZINC OXIDE 1 APPLICATION: 200 OINTMENT TOPICAL at 20:54

## 2023-01-22 RX ADMIN — LEVOTHYROXINE SODIUM 200 MCG: 0.1 TABLET ORAL at 06:11

## 2023-01-22 RX ADMIN — MORPHINE SULFATE 2 MG: 2 INJECTION, SOLUTION INTRAMUSCULAR; INTRAVENOUS at 18:00

## 2023-01-22 RX ADMIN — Medication 3 ML: at 08:36

## 2023-01-22 RX ADMIN — Medication 3 ML: at 20:54

## 2023-01-22 RX ADMIN — ONDANSETRON 4 MG: 2 INJECTION INTRAMUSCULAR; INTRAVENOUS at 16:17

## 2023-01-22 RX ADMIN — ZINC OXIDE 1 APPLICATION: 200 OINTMENT TOPICAL at 16:17

## 2023-01-23 ENCOUNTER — APPOINTMENT (OUTPATIENT)
Dept: CARDIOLOGY | Facility: HOSPITAL | Age: 58
DRG: 270 | End: 2023-01-23
Payer: MEDICARE

## 2023-01-23 LAB
ALBUMIN SERPL-MCNC: 2.3 G/DL (ref 3.5–5.2)
ANION GAP SERPL CALCULATED.3IONS-SCNC: 10.8 MMOL/L (ref 5–15)
ARTERIAL PATENCY WRIST A: ABNORMAL
ATMOSPHERIC PRESS: 754.3 MMHG
BASE EXCESS BLDA CALC-SCNC: -2.2 MMOL/L (ref 0–2)
BDY SITE: ABNORMAL
BUN SERPL-MCNC: 38 MG/DL (ref 6–20)
BUN/CREAT SERPL: 10.3 (ref 7–25)
CALCIUM SPEC-SCNC: 8.1 MG/DL (ref 8.6–10.5)
CHLORIDE SERPL-SCNC: 98 MMOL/L (ref 98–107)
CO2 SERPL-SCNC: 22.2 MMOL/L (ref 22–29)
CREAT SERPL-MCNC: 3.68 MG/DL (ref 0.57–1)
DEPRECATED RDW RBC AUTO: 43.8 FL (ref 37–54)
EGFRCR SERPLBLD CKD-EPI 2021: 13.8 ML/MIN/1.73
ERYTHROCYTE [DISTWIDTH] IN BLOOD BY AUTOMATED COUNT: 14.5 % (ref 12.3–15.4)
GAS FLOW AIRWAY: 4 LPM
GLUCOSE BLDC GLUCOMTR-MCNC: 107 MG/DL (ref 70–130)
GLUCOSE BLDC GLUCOMTR-MCNC: 121 MG/DL (ref 70–130)
GLUCOSE BLDC GLUCOMTR-MCNC: 151 MG/DL (ref 70–130)
GLUCOSE BLDC GLUCOMTR-MCNC: 91 MG/DL (ref 70–130)
GLUCOSE SERPL-MCNC: 128 MG/DL (ref 65–99)
HCO3 BLDA-SCNC: 25.3 MMOL/L (ref 22–28)
HCT VFR BLD AUTO: 34 % (ref 34–46.6)
HGB BLD-MCNC: 10.6 G/DL (ref 12–15.9)
MAGNESIUM SERPL-MCNC: 2.1 MG/DL (ref 1.6–2.6)
MCH RBC QN AUTO: 26.3 PG (ref 26.6–33)
MCHC RBC AUTO-ENTMCNC: 31.2 G/DL (ref 31.5–35.7)
MCV RBC AUTO: 84.4 FL (ref 79–97)
MODALITY: ABNORMAL
PCO2 BLDA: 54.7 MM HG (ref 35–45)
PH BLDA: 7.27 PH UNITS (ref 7.35–7.45)
PHOSPHATE SERPL-MCNC: 3.8 MG/DL (ref 2.5–4.5)
PLATELET # BLD AUTO: 324 10*3/MM3 (ref 140–450)
PMV BLD AUTO: 9.6 FL (ref 6–12)
PO2 BLDA: 63.9 MM HG (ref 80–100)
POTASSIUM SERPL-SCNC: 4.9 MMOL/L (ref 3.5–5.2)
QT INTERVAL: 391 MS
RBC # BLD AUTO: 4.03 10*6/MM3 (ref 3.77–5.28)
SAO2 % BLDCOA: 88.5 % (ref 92–99)
SODIUM SERPL-SCNC: 131 MMOL/L (ref 136–145)
TOTAL RATE: 10 BREATHS/MINUTE
TROPONIN T SERPL-MCNC: 0.41 NG/ML (ref 0–0.03)
WBC NRBC COR # BLD: 12.38 10*3/MM3 (ref 3.4–10.8)

## 2023-01-23 PROCEDURE — 93321 DOPPLER ECHO F-UP/LMTD STD: CPT | Performed by: INTERNAL MEDICINE

## 2023-01-23 PROCEDURE — 94799 UNLISTED PULMONARY SVC/PX: CPT

## 2023-01-23 PROCEDURE — 93325 DOPPLER ECHO COLOR FLOW MAPG: CPT

## 2023-01-23 PROCEDURE — 25010000002 PERFLUTREN (DEFINITY) 8.476 MG IN SODIUM CHLORIDE (PF) 0.9 % 10 ML INJECTION: Performed by: INTERNAL MEDICINE

## 2023-01-23 PROCEDURE — 85027 COMPLETE CBC AUTOMATED: CPT | Performed by: INTERNAL MEDICINE

## 2023-01-23 PROCEDURE — 25010000002 ONDANSETRON PER 1 MG: Performed by: INTERNAL MEDICINE

## 2023-01-23 PROCEDURE — 93308 TTE F-UP OR LMTD: CPT

## 2023-01-23 PROCEDURE — 36600 WITHDRAWAL OF ARTERIAL BLOOD: CPT

## 2023-01-23 PROCEDURE — 83735 ASSAY OF MAGNESIUM: CPT | Performed by: STUDENT IN AN ORGANIZED HEALTH CARE EDUCATION/TRAINING PROGRAM

## 2023-01-23 PROCEDURE — 80069 RENAL FUNCTION PANEL: CPT | Performed by: INTERNAL MEDICINE

## 2023-01-23 PROCEDURE — 84484 ASSAY OF TROPONIN QUANT: CPT | Performed by: INTERNAL MEDICINE

## 2023-01-23 PROCEDURE — 82803 BLOOD GASES ANY COMBINATION: CPT

## 2023-01-23 PROCEDURE — 63710000001 INSULIN LISPRO (HUMAN) PER 5 UNITS: Performed by: STUDENT IN AN ORGANIZED HEALTH CARE EDUCATION/TRAINING PROGRAM

## 2023-01-23 PROCEDURE — 93325 DOPPLER ECHO COLOR FLOW MAPG: CPT | Performed by: INTERNAL MEDICINE

## 2023-01-23 PROCEDURE — 99222 1ST HOSP IP/OBS MODERATE 55: CPT | Performed by: INTERNAL MEDICINE

## 2023-01-23 PROCEDURE — 97530 THERAPEUTIC ACTIVITIES: CPT

## 2023-01-23 PROCEDURE — 82962 GLUCOSE BLOOD TEST: CPT

## 2023-01-23 PROCEDURE — 93321 DOPPLER ECHO F-UP/LMTD STD: CPT

## 2023-01-23 PROCEDURE — 94660 CPAP INITIATION&MGMT: CPT

## 2023-01-23 PROCEDURE — 94761 N-INVAS EAR/PLS OXIMETRY MLT: CPT

## 2023-01-23 PROCEDURE — 93308 TTE F-UP OR LMTD: CPT | Performed by: INTERNAL MEDICINE

## 2023-01-23 PROCEDURE — 92526 ORAL FUNCTION THERAPY: CPT

## 2023-01-23 RX ORDER — OXYCODONE HYDROCHLORIDE AND ACETAMINOPHEN 5; 325 MG/1; MG/1
1 TABLET ORAL EVERY 4 HOURS PRN
Status: DISCONTINUED | OUTPATIENT
Start: 2023-01-23 | End: 2023-01-29 | Stop reason: HOSPADM

## 2023-01-23 RX ORDER — MANNITOL 250 MG/ML
12.5 INJECTION, SOLUTION INTRAVENOUS AS NEEDED
Status: CANCELLED | OUTPATIENT
Start: 2023-01-24 | End: 2023-01-24

## 2023-01-23 RX ADMIN — OXYCODONE HYDROCHLORIDE AND ACETAMINOPHEN 1 TABLET: 7.5; 325 TABLET ORAL at 06:43

## 2023-01-23 RX ADMIN — FOLIC ACID 1 MG: 1 TABLET ORAL at 09:29

## 2023-01-23 RX ADMIN — OXYCODONE HYDROCHLORIDE AND ACETAMINOPHEN 1 TABLET: 7.5; 325 TABLET ORAL at 12:00

## 2023-01-23 RX ADMIN — SERTRALINE 100 MG: 100 TABLET, FILM COATED ORAL at 09:29

## 2023-01-23 RX ADMIN — LEVOTHYROXINE SODIUM 200 MCG: 0.1 TABLET ORAL at 06:43

## 2023-01-23 RX ADMIN — ZINC OXIDE 1 APPLICATION: 200 OINTMENT TOPICAL at 17:14

## 2023-01-23 RX ADMIN — PERFLUTREN 2 ML: 6.52 INJECTION, SUSPENSION INTRAVENOUS at 17:01

## 2023-01-23 RX ADMIN — Medication 3 ML: at 20:49

## 2023-01-23 RX ADMIN — Medication 10 ML: at 20:49

## 2023-01-23 RX ADMIN — Medication 3 ML: at 09:30

## 2023-01-23 RX ADMIN — PANTOPRAZOLE SODIUM 40 MG: 40 TABLET, DELAYED RELEASE ORAL at 06:43

## 2023-01-23 RX ADMIN — ONDANSETRON 4 MG: 2 INJECTION INTRAMUSCULAR; INTRAVENOUS at 17:14

## 2023-01-23 RX ADMIN — LEVETIRACETAM 250 MG: 100 SOLUTION ORAL at 09:29

## 2023-01-23 RX ADMIN — INSULIN LISPRO 2 UNITS: 100 INJECTION, SOLUTION INTRAVENOUS; SUBCUTANEOUS at 11:56

## 2023-01-23 RX ADMIN — LEVETIRACETAM 250 MG: 100 SOLUTION ORAL at 20:48

## 2023-01-23 RX ADMIN — AMLODIPINE BESYLATE 10 MG: 10 TABLET ORAL at 09:29

## 2023-01-23 RX ADMIN — ZINC OXIDE 1 APPLICATION: 200 OINTMENT TOPICAL at 20:49

## 2023-01-23 RX ADMIN — ZINC OXIDE 1 APPLICATION: 200 OINTMENT TOPICAL at 09:35

## 2023-01-23 RX ADMIN — Medication 10 ML: at 09:35

## 2023-01-23 RX ADMIN — ONDANSETRON 4 MG: 2 INJECTION INTRAMUSCULAR; INTRAVENOUS at 06:48

## 2023-01-24 ENCOUNTER — APPOINTMENT (OUTPATIENT)
Dept: CT IMAGING | Facility: HOSPITAL | Age: 58
DRG: 270 | End: 2023-01-24
Payer: MEDICARE

## 2023-01-24 LAB
ALBUMIN SERPL-MCNC: 2.7 G/DL (ref 3.5–5.2)
ANION GAP SERPL CALCULATED.3IONS-SCNC: 9 MMOL/L (ref 5–15)
BACTERIA SPEC AEROBE CULT: NORMAL
BASOPHILS # BLD AUTO: 0.07 10*3/MM3 (ref 0–0.2)
BASOPHILS NFR BLD AUTO: 0.4 % (ref 0–1.5)
BH CV ECHO MEAS - AO MAX PG: 7.2 MMHG
BH CV ECHO MEAS - AO MEAN PG: 3.6 MMHG
BH CV ECHO MEAS - AO V2 MAX: 134.4 CM/SEC
BH CV ECHO MEAS - AO V2 VTI: 27.3 CM
BH CV ECHO MEAS - AVA(I,D): 2.6 CM2
BH CV ECHO MEAS - EDV(CUBED): 86.4 ML
BH CV ECHO MEAS - EDV(MOD-SP2): 123 ML
BH CV ECHO MEAS - EDV(MOD-SP4): 138 ML
BH CV ECHO MEAS - EF(MOD-BP): 52 %
BH CV ECHO MEAS - EF(MOD-SP2): 53.7 %
BH CV ECHO MEAS - EF(MOD-SP4): 48.6 %
BH CV ECHO MEAS - ESV(CUBED): 31.2 ML
BH CV ECHO MEAS - ESV(MOD-SP2): 57 ML
BH CV ECHO MEAS - ESV(MOD-SP4): 71 ML
BH CV ECHO MEAS - FS: 28.8 %
BH CV ECHO MEAS - IVS/LVPW: 1.01 CM
BH CV ECHO MEAS - IVSD: 1.11 CM
BH CV ECHO MEAS - LAT PEAK E' VEL: 11.4 CM/SEC
BH CV ECHO MEAS - LV DIASTOLIC VOL/BSA (35-75): 91.7 CM2
BH CV ECHO MEAS - LV MASS(C)D: 172.5 GRAMS
BH CV ECHO MEAS - LV MAX PG: 4.4 MMHG
BH CV ECHO MEAS - LV MEAN PG: 2.36 MMHG
BH CV ECHO MEAS - LV SYSTOLIC VOL/BSA (12-30): 47.2 CM2
BH CV ECHO MEAS - LV V1 MAX: 105.4 CM/SEC
BH CV ECHO MEAS - LV V1 VTI: 23.5 CM
BH CV ECHO MEAS - LVIDD: 4.4 CM
BH CV ECHO MEAS - LVIDS: 3.1 CM
BH CV ECHO MEAS - LVOT AREA: 3.1 CM2
BH CV ECHO MEAS - LVOT DIAM: 1.98 CM
BH CV ECHO MEAS - LVPWD: 1.11 CM
BH CV ECHO MEAS - MED PEAK E' VEL: 5.2 CM/SEC
BH CV ECHO MEAS - MR MAX PG: 59.4 MMHG
BH CV ECHO MEAS - MR MAX VEL: 385.5 CM/SEC
BH CV ECHO MEAS - MV A MAX VEL: 96.8 CM/SEC
BH CV ECHO MEAS - MV DEC SLOPE: 393.1 CM/SEC2
BH CV ECHO MEAS - MV DEC TIME: 0.26 MSEC
BH CV ECHO MEAS - MV E MAX VEL: 112 CM/SEC
BH CV ECHO MEAS - MV E/A: 1.16
BH CV ECHO MEAS - MV MAX PG: 5.1 MMHG
BH CV ECHO MEAS - MV MEAN PG: 2.5 MMHG
BH CV ECHO MEAS - MV P1/2T: 86 MSEC
BH CV ECHO MEAS - MV V2 VTI: 32.1 CM
BH CV ECHO MEAS - MVA(P1/2T): 2.6 CM2
BH CV ECHO MEAS - MVA(VTI): 2.25 CM2
BH CV ECHO MEAS - RAP SYSTOLE: 3 MMHG
BH CV ECHO MEAS - RVSP: 26.4 MMHG
BH CV ECHO MEAS - SI(MOD-SP2): 43.8 ML/M2
BH CV ECHO MEAS - SI(MOD-SP4): 44.5 ML/M2
BH CV ECHO MEAS - SV(LVOT): 72.2 ML
BH CV ECHO MEAS - SV(MOD-SP2): 66 ML
BH CV ECHO MEAS - SV(MOD-SP4): 67 ML
BH CV ECHO MEAS - TR MAX PG: 23.4 MMHG
BH CV ECHO MEAS - TR MAX VEL: 241.6 CM/SEC
BH CV ECHO MEASUREMENTS AVERAGE E/E' RATIO: 13.49
BH CV XLRA - TDI S': 10.6 CM/SEC
BUN SERPL-MCNC: 48 MG/DL (ref 6–20)
BUN/CREAT SERPL: 11 (ref 7–25)
CALCIUM SPEC-SCNC: 8.2 MG/DL (ref 8.6–10.5)
CHLORIDE SERPL-SCNC: 96 MMOL/L (ref 98–107)
CO2 SERPL-SCNC: 22 MMOL/L (ref 22–29)
CORTIS SERPL-MCNC: 17.7 MCG/DL
CREAT SERPL-MCNC: 4.36 MG/DL (ref 0.57–1)
DEPRECATED RDW RBC AUTO: 47.3 FL (ref 37–54)
EGFRCR SERPLBLD CKD-EPI 2021: 11.2 ML/MIN/1.73
EOSINOPHIL # BLD AUTO: 0.24 10*3/MM3 (ref 0–0.4)
EOSINOPHIL NFR BLD AUTO: 1.5 % (ref 0.3–6.2)
ERYTHROCYTE [DISTWIDTH] IN BLOOD BY AUTOMATED COUNT: 15.2 % (ref 12.3–15.4)
GLUCOSE BLDC GLUCOMTR-MCNC: 79 MG/DL (ref 70–130)
GLUCOSE BLDC GLUCOMTR-MCNC: 83 MG/DL (ref 70–130)
GLUCOSE BLDC GLUCOMTR-MCNC: 90 MG/DL (ref 70–130)
GLUCOSE BLDC GLUCOMTR-MCNC: 97 MG/DL (ref 70–130)
GLUCOSE SERPL-MCNC: 95 MG/DL (ref 65–99)
HCT VFR BLD AUTO: 31.6 % (ref 34–46.6)
HGB BLD-MCNC: 10 G/DL (ref 12–15.9)
IMM GRANULOCYTES # BLD AUTO: 0.41 10*3/MM3 (ref 0–0.05)
IMM GRANULOCYTES NFR BLD AUTO: 2.5 % (ref 0–0.5)
LEFT ATRIUM VOLUME INDEX: 43.4 ML/M2
LEFT ATRIUM VOLUME: 43.4 ML
LYMPHOCYTES # BLD AUTO: 1.4 10*3/MM3 (ref 0.7–3.1)
LYMPHOCYTES NFR BLD AUTO: 8.6 % (ref 19.6–45.3)
MAGNESIUM SERPL-MCNC: 2.2 MG/DL (ref 1.6–2.6)
MAXIMAL PREDICTED HEART RATE: 163 BPM
MCH RBC QN AUTO: 27.1 PG (ref 26.6–33)
MCHC RBC AUTO-ENTMCNC: 31.6 G/DL (ref 31.5–35.7)
MCV RBC AUTO: 85.6 FL (ref 79–97)
MONOCYTES # BLD AUTO: 1.3 10*3/MM3 (ref 0.1–0.9)
MONOCYTES NFR BLD AUTO: 7.9 % (ref 5–12)
NEUTROPHILS NFR BLD AUTO: 12.94 10*3/MM3 (ref 1.7–7)
NEUTROPHILS NFR BLD AUTO: 79.1 % (ref 42.7–76)
NRBC BLD AUTO-RTO: 0.1 /100 WBC (ref 0–0.2)
PHOSPHATE SERPL-MCNC: 4.4 MG/DL (ref 2.5–4.5)
PLATELET # BLD AUTO: 393 10*3/MM3 (ref 140–450)
PMV BLD AUTO: 10.2 FL (ref 6–12)
POTASSIUM SERPL-SCNC: 5.2 MMOL/L (ref 3.5–5.2)
PROCALCITONIN SERPL-MCNC: 0.97 NG/ML (ref 0–0.25)
RBC # BLD AUTO: 3.69 10*6/MM3 (ref 3.77–5.28)
SINUS: 2.8 CM
SODIUM SERPL-SCNC: 127 MMOL/L (ref 136–145)
STRESS TARGET HR: 139 BPM
T4 FREE SERPL-MCNC: 0.69 NG/DL (ref 0.93–1.7)
TSH SERPL DL<=0.05 MIU/L-ACNC: 153 UIU/ML (ref 0.27–4.2)
WBC NRBC COR # BLD: 16.36 10*3/MM3 (ref 3.4–10.8)

## 2023-01-24 PROCEDURE — 80069 RENAL FUNCTION PANEL: CPT | Performed by: INTERNAL MEDICINE

## 2023-01-24 PROCEDURE — 84145 PROCALCITONIN (PCT): CPT | Performed by: HOSPITALIST

## 2023-01-24 PROCEDURE — 25010000002 EPOETIN ALFA-EPBX 10000 UNIT/ML SOLUTION: Performed by: HOSPITALIST

## 2023-01-24 PROCEDURE — 82962 GLUCOSE BLOOD TEST: CPT

## 2023-01-24 PROCEDURE — 94799 UNLISTED PULMONARY SVC/PX: CPT

## 2023-01-24 PROCEDURE — 85025 COMPLETE CBC W/AUTO DIFF WBC: CPT | Performed by: INTERNAL MEDICINE

## 2023-01-24 PROCEDURE — 99232 SBSQ HOSP IP/OBS MODERATE 35: CPT | Performed by: NURSE PRACTITIONER

## 2023-01-24 PROCEDURE — 71250 CT THORAX DX C-: CPT

## 2023-01-24 PROCEDURE — 83735 ASSAY OF MAGNESIUM: CPT | Performed by: STUDENT IN AN ORGANIZED HEALTH CARE EDUCATION/TRAINING PROGRAM

## 2023-01-24 PROCEDURE — 84443 ASSAY THYROID STIM HORMONE: CPT | Performed by: INTERNAL MEDICINE

## 2023-01-24 PROCEDURE — 25010000002 ONDANSETRON PER 1 MG: Performed by: INTERNAL MEDICINE

## 2023-01-24 PROCEDURE — 84439 ASSAY OF FREE THYROXINE: CPT | Performed by: INTERNAL MEDICINE

## 2023-01-24 PROCEDURE — 94660 CPAP INITIATION&MGMT: CPT

## 2023-01-24 PROCEDURE — 25010000002 HYDROCORTISONE SOD SUC (PF) 100 MG RECONSTITUTED SOLUTION: Performed by: HOSPITALIST

## 2023-01-24 PROCEDURE — 82533 TOTAL CORTISOL: CPT | Performed by: HOSPITALIST

## 2023-01-24 RX ORDER — CARVEDILOL 3.12 MG/1
3.12 TABLET ORAL 2 TIMES DAILY WITH MEALS
Status: DISCONTINUED | OUTPATIENT
Start: 2023-01-24 | End: 2023-01-29 | Stop reason: HOSPADM

## 2023-01-24 RX ORDER — LEVOTHYROXINE SODIUM 20 UG/ML
100 INJECTION, SOLUTION INTRAVENOUS
Status: DISCONTINUED | OUTPATIENT
Start: 2023-01-24 | End: 2023-01-25

## 2023-01-24 RX ADMIN — HYDROCORTISONE SODIUM SUCCINATE 50 MG: 100 INJECTION, POWDER, FOR SOLUTION INTRAMUSCULAR; INTRAVENOUS at 18:34

## 2023-01-24 RX ADMIN — FOLIC ACID 1 MG: 1 TABLET ORAL at 08:01

## 2023-01-24 RX ADMIN — ONDANSETRON 4 MG: 2 INJECTION INTRAMUSCULAR; INTRAVENOUS at 18:40

## 2023-01-24 RX ADMIN — OXYCODONE AND ACETAMINOPHEN 1 TABLET: 5; 325 TABLET ORAL at 14:10

## 2023-01-24 RX ADMIN — Medication 3 ML: at 08:34

## 2023-01-24 RX ADMIN — LEVOTHYROXINE SODIUM 100 MCG: 20 INJECTION, SOLUTION INTRAVENOUS at 20:23

## 2023-01-24 RX ADMIN — AMLODIPINE BESYLATE 10 MG: 10 TABLET ORAL at 14:09

## 2023-01-24 RX ADMIN — LEVETIRACETAM 250 MG: 100 SOLUTION ORAL at 20:23

## 2023-01-24 RX ADMIN — ONDANSETRON 4 MG: 2 INJECTION INTRAMUSCULAR; INTRAVENOUS at 08:34

## 2023-01-24 RX ADMIN — CARVEDILOL 3.12 MG: 3.12 TABLET, FILM COATED ORAL at 18:35

## 2023-01-24 RX ADMIN — Medication 10 ML: at 20:23

## 2023-01-24 RX ADMIN — SERTRALINE 100 MG: 100 TABLET, FILM COATED ORAL at 08:01

## 2023-01-24 RX ADMIN — OXYCODONE AND ACETAMINOPHEN 1 TABLET: 5; 325 TABLET ORAL at 06:25

## 2023-01-24 RX ADMIN — ZINC OXIDE 1 APPLICATION: 200 OINTMENT TOPICAL at 20:23

## 2023-01-24 RX ADMIN — LEVETIRACETAM 250 MG: 100 SOLUTION ORAL at 08:01

## 2023-01-24 RX ADMIN — ZINC OXIDE 1 APPLICATION: 200 OINTMENT TOPICAL at 08:05

## 2023-01-24 RX ADMIN — Medication 3 ML: at 20:24

## 2023-01-24 RX ADMIN — PANTOPRAZOLE SODIUM 40 MG: 40 TABLET, DELAYED RELEASE ORAL at 06:25

## 2023-01-24 RX ADMIN — ZINC OXIDE 1 APPLICATION: 200 OINTMENT TOPICAL at 18:36

## 2023-01-24 RX ADMIN — LEVOTHYROXINE SODIUM 200 MCG: 0.1 TABLET ORAL at 06:25

## 2023-01-24 RX ADMIN — EPOETIN ALFA-EPBX 10000 UNITS: 10000 INJECTION, SOLUTION INTRAVENOUS; SUBCUTANEOUS at 09:50

## 2023-01-25 LAB
ALBUMIN SERPL-MCNC: 2.9 G/DL (ref 3.5–5.2)
ALBUMIN/GLOB SERPL: 0.9 G/DL
ALP SERPL-CCNC: 473 U/L (ref 39–117)
ALT SERPL W P-5'-P-CCNC: 7 U/L (ref 1–33)
ANION GAP SERPL CALCULATED.3IONS-SCNC: 18 MMOL/L (ref 5–15)
AST SERPL-CCNC: 22 U/L (ref 1–32)
BASOPHILS # BLD AUTO: 0.07 10*3/MM3 (ref 0–0.2)
BASOPHILS NFR BLD AUTO: 0.4 % (ref 0–1.5)
BILIRUB SERPL-MCNC: 0.6 MG/DL (ref 0–1.2)
BUN SERPL-MCNC: 34 MG/DL (ref 6–20)
BUN/CREAT SERPL: 10.8 (ref 7–25)
CALCIUM SPEC-SCNC: 7.8 MG/DL (ref 8.6–10.5)
CHLORIDE SERPL-SCNC: 95 MMOL/L (ref 98–107)
CO2 SERPL-SCNC: 14 MMOL/L (ref 22–29)
CREAT SERPL-MCNC: 3.16 MG/DL (ref 0.57–1)
DEPRECATED RDW RBC AUTO: 46 FL (ref 37–54)
EGFRCR SERPLBLD CKD-EPI 2021: 16.6 ML/MIN/1.73
EOSINOPHIL # BLD AUTO: 0 10*3/MM3 (ref 0–0.4)
EOSINOPHIL NFR BLD AUTO: 0 % (ref 0.3–6.2)
ERYTHROCYTE [DISTWIDTH] IN BLOOD BY AUTOMATED COUNT: 14.4 % (ref 12.3–15.4)
GLOBULIN UR ELPH-MCNC: 3.2 GM/DL
GLUCOSE BLDC GLUCOMTR-MCNC: 130 MG/DL (ref 70–130)
GLUCOSE BLDC GLUCOMTR-MCNC: 290 MG/DL (ref 70–130)
GLUCOSE BLDC GLUCOMTR-MCNC: 398 MG/DL (ref 70–130)
GLUCOSE BLDC GLUCOMTR-MCNC: 480 MG/DL (ref 70–130)
GLUCOSE BLDC GLUCOMTR-MCNC: 484 MG/DL (ref 70–130)
GLUCOSE SERPL-MCNC: 388 MG/DL (ref 65–99)
HCT VFR BLD AUTO: 31.7 % (ref 34–46.6)
HGB BLD-MCNC: 9.5 G/DL (ref 12–15.9)
IMM GRANULOCYTES # BLD AUTO: 0.75 10*3/MM3 (ref 0–0.05)
IMM GRANULOCYTES NFR BLD AUTO: 3.9 % (ref 0–0.5)
LYMPHOCYTES # BLD AUTO: 0.94 10*3/MM3 (ref 0.7–3.1)
LYMPHOCYTES NFR BLD AUTO: 4.9 % (ref 19.6–45.3)
MCH RBC QN AUTO: 26.6 PG (ref 26.6–33)
MCHC RBC AUTO-ENTMCNC: 30 G/DL (ref 31.5–35.7)
MCV RBC AUTO: 88.8 FL (ref 79–97)
MONOCYTES # BLD AUTO: 1.06 10*3/MM3 (ref 0.1–0.9)
MONOCYTES NFR BLD AUTO: 5.5 % (ref 5–12)
NEUTROPHILS NFR BLD AUTO: 16.46 10*3/MM3 (ref 1.7–7)
NEUTROPHILS NFR BLD AUTO: 85.3 % (ref 42.7–76)
NRBC BLD AUTO-RTO: 0.1 /100 WBC (ref 0–0.2)
PLATELET # BLD AUTO: 431 10*3/MM3 (ref 140–450)
PMV BLD AUTO: 10.6 FL (ref 6–12)
POTASSIUM SERPL-SCNC: 4.5 MMOL/L (ref 3.5–5.2)
PROT SERPL-MCNC: 6.1 G/DL (ref 6–8.5)
RBC # BLD AUTO: 3.57 10*6/MM3 (ref 3.77–5.28)
SODIUM SERPL-SCNC: 127 MMOL/L (ref 136–145)
T3FREE SERPL-MCNC: 0.57 PG/ML (ref 2–4.4)
T4 FREE SERPL-MCNC: 0.9 NG/DL (ref 0.93–1.7)
TSH SERPL DL<=0.05 MIU/L-ACNC: 64.1 UIU/ML (ref 0.27–4.2)
WBC NRBC COR # BLD: 19.28 10*3/MM3 (ref 3.4–10.8)

## 2023-01-25 PROCEDURE — 25010000002 HYDROCORTISONE SOD SUC (PF) 100 MG RECONSTITUTED SOLUTION: Performed by: HOSPITALIST

## 2023-01-25 PROCEDURE — 84439 ASSAY OF FREE THYROXINE: CPT | Performed by: HOSPITALIST

## 2023-01-25 PROCEDURE — 97530 THERAPEUTIC ACTIVITIES: CPT

## 2023-01-25 PROCEDURE — 84481 FREE ASSAY (FT-3): CPT | Performed by: HOSPITALIST

## 2023-01-25 PROCEDURE — 82962 GLUCOSE BLOOD TEST: CPT

## 2023-01-25 PROCEDURE — 85025 COMPLETE CBC W/AUTO DIFF WBC: CPT | Performed by: HOSPITALIST

## 2023-01-25 PROCEDURE — 99232 SBSQ HOSP IP/OBS MODERATE 35: CPT | Performed by: NURSE PRACTITIONER

## 2023-01-25 PROCEDURE — 63710000001 INSULIN LISPRO (HUMAN) PER 5 UNITS: Performed by: NURSE PRACTITIONER

## 2023-01-25 PROCEDURE — 63710000001 INSULIN LISPRO (HUMAN) PER 5 UNITS: Performed by: STUDENT IN AN ORGANIZED HEALTH CARE EDUCATION/TRAINING PROGRAM

## 2023-01-25 PROCEDURE — 80053 COMPREHEN METABOLIC PANEL: CPT | Performed by: HOSPITALIST

## 2023-01-25 PROCEDURE — 84443 ASSAY THYROID STIM HORMONE: CPT | Performed by: HOSPITALIST

## 2023-01-25 RX ORDER — LEVOTHYROXINE SODIUM 20 UG/ML
150 INJECTION, SOLUTION INTRAVENOUS
Status: DISCONTINUED | OUTPATIENT
Start: 2023-01-26 | End: 2023-01-26

## 2023-01-25 RX ORDER — INSULIN LISPRO 100 [IU]/ML
7 INJECTION, SOLUTION INTRAVENOUS; SUBCUTANEOUS ONCE
Status: COMPLETED | OUTPATIENT
Start: 2023-01-25 | End: 2023-01-25

## 2023-01-25 RX ADMIN — ACETAMINOPHEN 650 MG: 325 TABLET, FILM COATED ORAL at 20:53

## 2023-01-25 RX ADMIN — INSULIN LISPRO 7 UNITS: 100 INJECTION, SOLUTION INTRAVENOUS; SUBCUTANEOUS at 21:39

## 2023-01-25 RX ADMIN — SERTRALINE 100 MG: 100 TABLET, FILM COATED ORAL at 08:54

## 2023-01-25 RX ADMIN — ZINC OXIDE 1 APPLICATION: 200 OINTMENT TOPICAL at 19:05

## 2023-01-25 RX ADMIN — Medication 10 ML: at 21:39

## 2023-01-25 RX ADMIN — INSULIN LISPRO 6 UNITS: 100 INJECTION, SOLUTION INTRAVENOUS; SUBCUTANEOUS at 18:55

## 2023-01-25 RX ADMIN — FOLIC ACID 1 MG: 1 TABLET ORAL at 08:53

## 2023-01-25 RX ADMIN — LEVETIRACETAM 250 MG: 100 SOLUTION ORAL at 20:53

## 2023-01-25 RX ADMIN — HYDROCORTISONE SODIUM SUCCINATE 50 MG: 100 INJECTION, POWDER, FOR SOLUTION INTRAMUSCULAR; INTRAVENOUS at 19:05

## 2023-01-25 RX ADMIN — OXYCODONE AND ACETAMINOPHEN 1 TABLET: 5; 325 TABLET ORAL at 18:57

## 2023-01-25 RX ADMIN — Medication 10 ML: at 08:56

## 2023-01-25 RX ADMIN — OXYCODONE AND ACETAMINOPHEN 1 TABLET: 5; 325 TABLET ORAL at 12:57

## 2023-01-25 RX ADMIN — ZINC OXIDE 1 APPLICATION: 200 OINTMENT TOPICAL at 20:53

## 2023-01-25 RX ADMIN — HYDROCORTISONE SODIUM SUCCINATE 50 MG: 100 INJECTION, POWDER, FOR SOLUTION INTRAMUSCULAR; INTRAVENOUS at 12:35

## 2023-01-25 RX ADMIN — OXYCODONE AND ACETAMINOPHEN 1 TABLET: 5; 325 TABLET ORAL at 08:55

## 2023-01-25 RX ADMIN — ZINC OXIDE 1 APPLICATION: 200 OINTMENT TOPICAL at 08:54

## 2023-01-25 RX ADMIN — Medication 3 ML: at 21:39

## 2023-01-25 RX ADMIN — HYDROCORTISONE SODIUM SUCCINATE 50 MG: 100 INJECTION, POWDER, FOR SOLUTION INTRAMUSCULAR; INTRAVENOUS at 01:59

## 2023-01-25 RX ADMIN — CARVEDILOL 3.12 MG: 3.12 TABLET, FILM COATED ORAL at 08:53

## 2023-01-25 RX ADMIN — CARVEDILOL 3.12 MG: 3.12 TABLET, FILM COATED ORAL at 19:05

## 2023-01-25 RX ADMIN — AMLODIPINE BESYLATE 10 MG: 10 TABLET ORAL at 08:53

## 2023-01-25 RX ADMIN — LEVETIRACETAM 250 MG: 100 SOLUTION ORAL at 08:54

## 2023-01-25 RX ADMIN — LEVOTHYROXINE SODIUM 100 MCG: 20 INJECTION, SOLUTION INTRAVENOUS at 12:36

## 2023-01-25 RX ADMIN — PANTOPRAZOLE SODIUM 40 MG: 40 TABLET, DELAYED RELEASE ORAL at 08:54

## 2023-01-25 RX ADMIN — INSULIN LISPRO 4 UNITS: 100 INJECTION, SOLUTION INTRAVENOUS; SUBCUTANEOUS at 12:37

## 2023-01-26 LAB
ALBUMIN SERPL-MCNC: 3.3 G/DL (ref 3.5–5.2)
ALBUMIN/GLOB SERPL: 1 G/DL
ALP SERPL-CCNC: 529 U/L (ref 39–117)
ALT SERPL W P-5'-P-CCNC: <5 U/L (ref 1–33)
ANION GAP SERPL CALCULATED.3IONS-SCNC: 12 MMOL/L (ref 5–15)
AST SERPL-CCNC: 16 U/L (ref 1–32)
BASOPHILS # BLD AUTO: 0.06 10*3/MM3 (ref 0–0.2)
BASOPHILS NFR BLD AUTO: 0.3 % (ref 0–1.5)
BILIRUB SERPL-MCNC: 0.7 MG/DL (ref 0–1.2)
BUN SERPL-MCNC: 43 MG/DL (ref 6–20)
BUN/CREAT SERPL: 13.1 (ref 7–25)
CALCIUM SPEC-SCNC: 8.3 MG/DL (ref 8.6–10.5)
CHLORIDE SERPL-SCNC: 93 MMOL/L (ref 98–107)
CO2 SERPL-SCNC: 21 MMOL/L (ref 22–29)
CREAT SERPL-MCNC: 3.28 MG/DL (ref 0.57–1)
DEPRECATED RDW RBC AUTO: 48.1 FL (ref 37–54)
EGFRCR SERPLBLD CKD-EPI 2021: 15.8 ML/MIN/1.73
EOSINOPHIL # BLD AUTO: 0 10*3/MM3 (ref 0–0.4)
EOSINOPHIL NFR BLD AUTO: 0 % (ref 0.3–6.2)
ERYTHROCYTE [DISTWIDTH] IN BLOOD BY AUTOMATED COUNT: 14.6 % (ref 12.3–15.4)
GLOBULIN UR ELPH-MCNC: 3.3 GM/DL
GLUCOSE BLDC GLUCOMTR-MCNC: 222 MG/DL (ref 70–130)
GLUCOSE BLDC GLUCOMTR-MCNC: 234 MG/DL (ref 70–130)
GLUCOSE BLDC GLUCOMTR-MCNC: 356 MG/DL (ref 70–130)
GLUCOSE BLDC GLUCOMTR-MCNC: 433 MG/DL (ref 70–130)
GLUCOSE BLDC GLUCOMTR-MCNC: 441 MG/DL (ref 70–130)
GLUCOSE BLDC GLUCOMTR-MCNC: 466 MG/DL (ref 70–130)
GLUCOSE SERPL-MCNC: 460 MG/DL (ref 65–99)
HCT VFR BLD AUTO: 36.3 % (ref 34–46.6)
HGB BLD-MCNC: 10.9 G/DL (ref 12–15.9)
IMM GRANULOCYTES # BLD AUTO: 0.6 10*3/MM3 (ref 0–0.05)
IMM GRANULOCYTES NFR BLD AUTO: 3.2 % (ref 0–0.5)
LYMPHOCYTES # BLD AUTO: 0.89 10*3/MM3 (ref 0.7–3.1)
LYMPHOCYTES NFR BLD AUTO: 4.7 % (ref 19.6–45.3)
MCH RBC QN AUTO: 26.8 PG (ref 26.6–33)
MCHC RBC AUTO-ENTMCNC: 30 G/DL (ref 31.5–35.7)
MCV RBC AUTO: 89.2 FL (ref 79–97)
MONOCYTES # BLD AUTO: 0.53 10*3/MM3 (ref 0.1–0.9)
MONOCYTES NFR BLD AUTO: 2.8 % (ref 5–12)
NEUTROPHILS NFR BLD AUTO: 16.68 10*3/MM3 (ref 1.7–7)
NEUTROPHILS NFR BLD AUTO: 89 % (ref 42.7–76)
NRBC BLD AUTO-RTO: 0.1 /100 WBC (ref 0–0.2)
PLATELET # BLD AUTO: 476 10*3/MM3 (ref 140–450)
PMV BLD AUTO: 10.1 FL (ref 6–12)
POTASSIUM SERPL-SCNC: 5.1 MMOL/L (ref 3.5–5.2)
PROT SERPL-MCNC: 6.6 G/DL (ref 6–8.5)
RBC # BLD AUTO: 4.07 10*6/MM3 (ref 3.77–5.28)
SODIUM SERPL-SCNC: 126 MMOL/L (ref 136–145)
T3FREE SERPL-MCNC: 0.57 PG/ML (ref 2–4.4)
T4 FREE SERPL-MCNC: 0.9 NG/DL (ref 0.93–1.7)
WBC NRBC COR # BLD: 18.76 10*3/MM3 (ref 3.4–10.8)

## 2023-01-26 PROCEDURE — 63710000001 INSULIN LISPRO (HUMAN) PER 5 UNITS: Performed by: STUDENT IN AN ORGANIZED HEALTH CARE EDUCATION/TRAINING PROGRAM

## 2023-01-26 PROCEDURE — 99231 SBSQ HOSP IP/OBS SF/LOW 25: CPT | Performed by: NURSE PRACTITIONER

## 2023-01-26 PROCEDURE — 85025 COMPLETE CBC W/AUTO DIFF WBC: CPT | Performed by: HOSPITALIST

## 2023-01-26 PROCEDURE — 82962 GLUCOSE BLOOD TEST: CPT

## 2023-01-26 PROCEDURE — 25010000002 EPOETIN ALFA-EPBX 10000 UNIT/ML SOLUTION: Performed by: HOSPITALIST

## 2023-01-26 PROCEDURE — 94799 UNLISTED PULMONARY SVC/PX: CPT

## 2023-01-26 PROCEDURE — 25010000002 HYDROCORTISONE SOD SUC (PF) 100 MG RECONSTITUTED SOLUTION: Performed by: HOSPITALIST

## 2023-01-26 PROCEDURE — 25010000002 HEPARIN (PORCINE) PER 1000 UNITS: Performed by: INTERNAL MEDICINE

## 2023-01-26 PROCEDURE — 80053 COMPREHEN METABOLIC PANEL: CPT | Performed by: HOSPITALIST

## 2023-01-26 PROCEDURE — 63710000001 INSULIN LISPRO (HUMAN) PER 5 UNITS: Performed by: HOSPITALIST

## 2023-01-26 RX ORDER — ASPIRIN 81 MG/1
81 TABLET ORAL DAILY
Status: DISCONTINUED | OUTPATIENT
Start: 2023-01-27 | End: 2023-01-29 | Stop reason: HOSPADM

## 2023-01-26 RX ORDER — INSULIN LISPRO 100 [IU]/ML
7 INJECTION, SOLUTION INTRAVENOUS; SUBCUTANEOUS ONCE
Status: COMPLETED | OUTPATIENT
Start: 2023-01-26 | End: 2023-01-26

## 2023-01-26 RX ORDER — INSULIN LISPRO 100 [IU]/ML
2 INJECTION, SOLUTION INTRAVENOUS; SUBCUTANEOUS ONCE
Status: COMPLETED | OUTPATIENT
Start: 2023-01-26 | End: 2023-01-26

## 2023-01-26 RX ADMIN — CARVEDILOL 3.12 MG: 3.12 TABLET, FILM COATED ORAL at 15:25

## 2023-01-26 RX ADMIN — OXYCODONE AND ACETAMINOPHEN 1 TABLET: 5; 325 TABLET ORAL at 04:16

## 2023-01-26 RX ADMIN — Medication 10 ML: at 10:26

## 2023-01-26 RX ADMIN — Medication 3 ML: at 20:39

## 2023-01-26 RX ADMIN — HEPARIN SODIUM 4000 UNITS: 1000 INJECTION INTRAVENOUS; SUBCUTANEOUS at 12:39

## 2023-01-26 RX ADMIN — LEVETIRACETAM 250 MG: 100 SOLUTION ORAL at 20:39

## 2023-01-26 RX ADMIN — Medication 10 ML: at 20:40

## 2023-01-26 RX ADMIN — ZINC OXIDE 1 APPLICATION: 200 OINTMENT TOPICAL at 16:16

## 2023-01-26 RX ADMIN — INSULIN LISPRO 2 UNITS: 100 INJECTION, SOLUTION INTRAVENOUS; SUBCUTANEOUS at 06:56

## 2023-01-26 RX ADMIN — OXYCODONE AND ACETAMINOPHEN 1 TABLET: 5; 325 TABLET ORAL at 08:16

## 2023-01-26 RX ADMIN — INSULIN LISPRO 7 UNITS: 100 INJECTION, SOLUTION INTRAVENOUS; SUBCUTANEOUS at 06:57

## 2023-01-26 RX ADMIN — SERTRALINE 100 MG: 100 TABLET, FILM COATED ORAL at 08:13

## 2023-01-26 RX ADMIN — INSULIN LISPRO 6 UNITS: 100 INJECTION, SOLUTION INTRAVENOUS; SUBCUTANEOUS at 18:32

## 2023-01-26 RX ADMIN — ZINC OXIDE 1 APPLICATION: 200 OINTMENT TOPICAL at 20:43

## 2023-01-26 RX ADMIN — AMLODIPINE BESYLATE 10 MG: 10 TABLET ORAL at 15:25

## 2023-01-26 RX ADMIN — ZINC OXIDE 1 APPLICATION: 200 OINTMENT TOPICAL at 08:14

## 2023-01-26 RX ADMIN — LEVETIRACETAM 250 MG: 100 SOLUTION ORAL at 08:13

## 2023-01-26 RX ADMIN — INSULIN LISPRO 7 UNITS: 100 INJECTION, SOLUTION INTRAVENOUS; SUBCUTANEOUS at 08:13

## 2023-01-26 RX ADMIN — OXYCODONE AND ACETAMINOPHEN 1 TABLET: 5; 325 TABLET ORAL at 15:21

## 2023-01-26 RX ADMIN — FOLIC ACID 1 MG: 1 TABLET ORAL at 08:13

## 2023-01-26 RX ADMIN — EPOETIN ALFA-EPBX 10000 UNITS: 10000 INJECTION, SOLUTION INTRAVENOUS; SUBCUTANEOUS at 09:50

## 2023-01-26 RX ADMIN — HYDROCORTISONE SODIUM SUCCINATE 50 MG: 100 INJECTION, POWDER, FOR SOLUTION INTRAMUSCULAR; INTRAVENOUS at 01:26

## 2023-01-26 RX ADMIN — LEVOTHYROXINE SODIUM 275 MCG: 0.17 TABLET ORAL at 16:15

## 2023-01-26 RX ADMIN — Medication 3 ML: at 08:14

## 2023-01-26 RX ADMIN — INSULIN LISPRO 3 UNITS: 100 INJECTION, SOLUTION INTRAVENOUS; SUBCUTANEOUS at 15:21

## 2023-01-26 RX ADMIN — PANTOPRAZOLE SODIUM 40 MG: 40 TABLET, DELAYED RELEASE ORAL at 04:16

## 2023-01-27 LAB
ALBUMIN SERPL-MCNC: 2.9 G/DL (ref 3.5–5.2)
ALBUMIN/GLOB SERPL: 1 G/DL
ALP SERPL-CCNC: 359 U/L (ref 39–117)
ALT SERPL W P-5'-P-CCNC: <5 U/L (ref 1–33)
ANION GAP SERPL CALCULATED.3IONS-SCNC: 10.4 MMOL/L (ref 5–15)
AST SERPL-CCNC: 12 U/L (ref 1–32)
BASOPHILS # BLD AUTO: 0.1 10*3/MM3 (ref 0–0.2)
BASOPHILS NFR BLD AUTO: 0.5 % (ref 0–1.5)
BILIRUB SERPL-MCNC: 0.5 MG/DL (ref 0–1.2)
BUN SERPL-MCNC: 24 MG/DL (ref 6–20)
BUN/CREAT SERPL: 10.9 (ref 7–25)
CALCIUM SPEC-SCNC: 8.1 MG/DL (ref 8.6–10.5)
CHLORIDE SERPL-SCNC: 99 MMOL/L (ref 98–107)
CO2 SERPL-SCNC: 21.6 MMOL/L (ref 22–29)
CREAT SERPL-MCNC: 2.2 MG/DL (ref 0.57–1)
DEPRECATED RDW RBC AUTO: 46.9 FL (ref 37–54)
EGFRCR SERPLBLD CKD-EPI 2021: 25.6 ML/MIN/1.73
EOSINOPHIL # BLD AUTO: 0.01 10*3/MM3 (ref 0–0.4)
EOSINOPHIL NFR BLD AUTO: 0.1 % (ref 0.3–6.2)
ERYTHROCYTE [DISTWIDTH] IN BLOOD BY AUTOMATED COUNT: 15.2 % (ref 12.3–15.4)
GLOBULIN UR ELPH-MCNC: 2.9 GM/DL
GLUCOSE BLDC GLUCOMTR-MCNC: 167 MG/DL (ref 70–130)
GLUCOSE BLDC GLUCOMTR-MCNC: 183 MG/DL (ref 70–130)
GLUCOSE BLDC GLUCOMTR-MCNC: 194 MG/DL (ref 70–130)
GLUCOSE BLDC GLUCOMTR-MCNC: 234 MG/DL (ref 70–130)
GLUCOSE BLDC GLUCOMTR-MCNC: 280 MG/DL (ref 70–130)
GLUCOSE SERPL-MCNC: 211 MG/DL (ref 65–99)
HCT VFR BLD AUTO: 36.8 % (ref 34–46.6)
HGB BLD-MCNC: 11.6 G/DL (ref 12–15.9)
IMM GRANULOCYTES # BLD AUTO: 0.67 10*3/MM3 (ref 0–0.05)
IMM GRANULOCYTES NFR BLD AUTO: 3.5 % (ref 0–0.5)
LYMPHOCYTES # BLD AUTO: 1.7 10*3/MM3 (ref 0.7–3.1)
LYMPHOCYTES NFR BLD AUTO: 8.9 % (ref 19.6–45.3)
MCH RBC QN AUTO: 27.2 PG (ref 26.6–33)
MCHC RBC AUTO-ENTMCNC: 31.5 G/DL (ref 31.5–35.7)
MCV RBC AUTO: 86.2 FL (ref 79–97)
MONOCYTES # BLD AUTO: 1.32 10*3/MM3 (ref 0.1–0.9)
MONOCYTES NFR BLD AUTO: 6.9 % (ref 5–12)
NEUTROPHILS NFR BLD AUTO: 15.4 10*3/MM3 (ref 1.7–7)
NEUTROPHILS NFR BLD AUTO: 80.1 % (ref 42.7–76)
NRBC BLD AUTO-RTO: 0.3 /100 WBC (ref 0–0.2)
PLATELET # BLD AUTO: 459 10*3/MM3 (ref 140–450)
PMV BLD AUTO: 9.7 FL (ref 6–12)
POTASSIUM SERPL-SCNC: 3.7 MMOL/L (ref 3.5–5.2)
PROT SERPL-MCNC: 5.8 G/DL (ref 6–8.5)
RBC # BLD AUTO: 4.27 10*6/MM3 (ref 3.77–5.28)
SODIUM SERPL-SCNC: 131 MMOL/L (ref 136–145)
WBC NRBC COR # BLD: 19.2 10*3/MM3 (ref 3.4–10.8)

## 2023-01-27 PROCEDURE — 82962 GLUCOSE BLOOD TEST: CPT

## 2023-01-27 PROCEDURE — 85025 COMPLETE CBC W/AUTO DIFF WBC: CPT | Performed by: HOSPITALIST

## 2023-01-27 PROCEDURE — 80053 COMPREHEN METABOLIC PANEL: CPT | Performed by: HOSPITALIST

## 2023-01-27 PROCEDURE — 63710000001 INSULIN LISPRO (HUMAN) PER 5 UNITS: Performed by: STUDENT IN AN ORGANIZED HEALTH CARE EDUCATION/TRAINING PROGRAM

## 2023-01-27 PROCEDURE — 25010000002 HEPARIN (PORCINE) PER 1000 UNITS: Performed by: INTERNAL MEDICINE

## 2023-01-27 RX ADMIN — ZINC OXIDE 1 APPLICATION: 200 OINTMENT TOPICAL at 13:31

## 2023-01-27 RX ADMIN — OXYCODONE AND ACETAMINOPHEN 1 TABLET: 5; 325 TABLET ORAL at 22:16

## 2023-01-27 RX ADMIN — Medication 10 ML: at 13:33

## 2023-01-27 RX ADMIN — INSULIN LISPRO 2 UNITS: 100 INJECTION, SOLUTION INTRAVENOUS; SUBCUTANEOUS at 13:30

## 2023-01-27 RX ADMIN — Medication 3 ML: at 13:33

## 2023-01-27 RX ADMIN — Medication 10 ML: at 22:14

## 2023-01-27 RX ADMIN — LEVOTHYROXINE SODIUM 275 MCG: 0.17 TABLET ORAL at 05:33

## 2023-01-27 RX ADMIN — CARVEDILOL 3.12 MG: 3.12 TABLET, FILM COATED ORAL at 17:36

## 2023-01-27 RX ADMIN — OXYCODONE AND ACETAMINOPHEN 1 TABLET: 5; 325 TABLET ORAL at 09:40

## 2023-01-27 RX ADMIN — FOLIC ACID 1 MG: 1 TABLET ORAL at 13:30

## 2023-01-27 RX ADMIN — LEVETIRACETAM 250 MG: 100 SOLUTION ORAL at 22:12

## 2023-01-27 RX ADMIN — INSULIN LISPRO 4 UNITS: 100 INJECTION, SOLUTION INTRAVENOUS; SUBCUTANEOUS at 17:37

## 2023-01-27 RX ADMIN — OXYCODONE AND ACETAMINOPHEN 1 TABLET: 5; 325 TABLET ORAL at 17:36

## 2023-01-27 RX ADMIN — AMLODIPINE BESYLATE 10 MG: 10 TABLET ORAL at 13:30

## 2023-01-27 RX ADMIN — HEPARIN SODIUM 4000 UNITS: 1000 INJECTION INTRAVENOUS; SUBCUTANEOUS at 11:40

## 2023-01-27 RX ADMIN — ASPIRIN 81 MG: 81 TABLET, COATED ORAL at 13:33

## 2023-01-27 RX ADMIN — PANTOPRAZOLE SODIUM 40 MG: 40 TABLET, DELAYED RELEASE ORAL at 05:33

## 2023-01-27 RX ADMIN — OXYCODONE AND ACETAMINOPHEN 1 TABLET: 5; 325 TABLET ORAL at 05:35

## 2023-01-27 RX ADMIN — SERTRALINE 100 MG: 100 TABLET, FILM COATED ORAL at 13:30

## 2023-01-27 RX ADMIN — LEVETIRACETAM 250 MG: 100 SOLUTION ORAL at 13:33

## 2023-01-27 RX ADMIN — Medication 3 ML: at 22:14

## 2023-01-27 RX ADMIN — CARVEDILOL 3.12 MG: 3.12 TABLET, FILM COATED ORAL at 13:30

## 2023-01-28 LAB
ALBUMIN SERPL-MCNC: 2.7 G/DL (ref 3.5–5.2)
ANION GAP SERPL CALCULATED.3IONS-SCNC: 9.6 MMOL/L (ref 5–15)
BASOPHILS # BLD AUTO: 0.06 10*3/MM3 (ref 0–0.2)
BASOPHILS NFR BLD AUTO: 0.4 % (ref 0–1.5)
BUN SERPL-MCNC: 19 MG/DL (ref 6–20)
BUN/CREAT SERPL: 9.5 (ref 7–25)
CALCIUM SPEC-SCNC: 8.1 MG/DL (ref 8.6–10.5)
CHLORIDE SERPL-SCNC: 100 MMOL/L (ref 98–107)
CO2 SERPL-SCNC: 21.4 MMOL/L (ref 22–29)
CREAT SERPL-MCNC: 1.99 MG/DL (ref 0.57–1)
DEPRECATED RDW RBC AUTO: 46.6 FL (ref 37–54)
EGFRCR SERPLBLD CKD-EPI 2021: 28.8 ML/MIN/1.73
EOSINOPHIL # BLD AUTO: 0.01 10*3/MM3 (ref 0–0.4)
EOSINOPHIL NFR BLD AUTO: 0.1 % (ref 0.3–6.2)
ERYTHROCYTE [DISTWIDTH] IN BLOOD BY AUTOMATED COUNT: 15.3 % (ref 12.3–15.4)
GLUCOSE BLDC GLUCOMTR-MCNC: 185 MG/DL (ref 70–130)
GLUCOSE BLDC GLUCOMTR-MCNC: 211 MG/DL (ref 70–130)
GLUCOSE BLDC GLUCOMTR-MCNC: 274 MG/DL (ref 70–130)
GLUCOSE BLDC GLUCOMTR-MCNC: 338 MG/DL (ref 70–130)
GLUCOSE BLDC GLUCOMTR-MCNC: 373 MG/DL (ref 70–130)
GLUCOSE SERPL-MCNC: 201 MG/DL (ref 65–99)
HCT VFR BLD AUTO: 35.7 % (ref 34–46.6)
HGB BLD-MCNC: 10.9 G/DL (ref 12–15.9)
IMM GRANULOCYTES # BLD AUTO: 0.38 10*3/MM3 (ref 0–0.05)
IMM GRANULOCYTES NFR BLD AUTO: 2.3 % (ref 0–0.5)
LYMPHOCYTES # BLD AUTO: 1.13 10*3/MM3 (ref 0.7–3.1)
LYMPHOCYTES NFR BLD AUTO: 6.7 % (ref 19.6–45.3)
MCH RBC QN AUTO: 26.1 PG (ref 26.6–33)
MCHC RBC AUTO-ENTMCNC: 30.5 G/DL (ref 31.5–35.7)
MCV RBC AUTO: 85.6 FL (ref 79–97)
MONOCYTES # BLD AUTO: 1.01 10*3/MM3 (ref 0.1–0.9)
MONOCYTES NFR BLD AUTO: 6 % (ref 5–12)
NEUTROPHILS NFR BLD AUTO: 14.28 10*3/MM3 (ref 1.7–7)
NEUTROPHILS NFR BLD AUTO: 84.5 % (ref 42.7–76)
NRBC BLD AUTO-RTO: 0.1 /100 WBC (ref 0–0.2)
PHOSPHATE SERPL-MCNC: 1.3 MG/DL (ref 2.5–4.5)
PLATELET # BLD AUTO: 412 10*3/MM3 (ref 140–450)
PMV BLD AUTO: 10 FL (ref 6–12)
POTASSIUM SERPL-SCNC: 3.7 MMOL/L (ref 3.5–5.2)
RBC # BLD AUTO: 4.17 10*6/MM3 (ref 3.77–5.28)
SODIUM SERPL-SCNC: 131 MMOL/L (ref 136–145)
T4 FREE SERPL-MCNC: 0.87 NG/DL (ref 0.93–1.7)
WBC NRBC COR # BLD: 16.87 10*3/MM3 (ref 3.4–10.8)

## 2023-01-28 PROCEDURE — 80069 RENAL FUNCTION PANEL: CPT | Performed by: INTERNAL MEDICINE

## 2023-01-28 PROCEDURE — 63710000001 INSULIN LISPRO (HUMAN) PER 5 UNITS: Performed by: STUDENT IN AN ORGANIZED HEALTH CARE EDUCATION/TRAINING PROGRAM

## 2023-01-28 PROCEDURE — 25010000002 EPOETIN ALFA-EPBX 10000 UNIT/ML SOLUTION: Performed by: HOSPITALIST

## 2023-01-28 PROCEDURE — 82962 GLUCOSE BLOOD TEST: CPT

## 2023-01-28 PROCEDURE — 85025 COMPLETE CBC W/AUTO DIFF WBC: CPT | Performed by: INTERNAL MEDICINE

## 2023-01-28 PROCEDURE — 84439 ASSAY OF FREE THYROXINE: CPT | Performed by: HOSPITALIST

## 2023-01-28 RX ADMIN — INSULIN LISPRO 3 UNITS: 100 INJECTION, SOLUTION INTRAVENOUS; SUBCUTANEOUS at 08:59

## 2023-01-28 RX ADMIN — CARVEDILOL 3.12 MG: 3.12 TABLET, FILM COATED ORAL at 08:59

## 2023-01-28 RX ADMIN — INSULIN LISPRO 4 UNITS: 100 INJECTION, SOLUTION INTRAVENOUS; SUBCUTANEOUS at 16:55

## 2023-01-28 RX ADMIN — OXYCODONE AND ACETAMINOPHEN 1 TABLET: 5; 325 TABLET ORAL at 11:33

## 2023-01-28 RX ADMIN — ZINC OXIDE 1 APPLICATION: 200 OINTMENT TOPICAL at 15:50

## 2023-01-28 RX ADMIN — LEVOTHYROXINE SODIUM 275 MCG: 0.17 TABLET ORAL at 06:33

## 2023-01-28 RX ADMIN — LEVETIRACETAM 250 MG: 100 SOLUTION ORAL at 09:00

## 2023-01-28 RX ADMIN — PANTOPRAZOLE SODIUM 40 MG: 40 TABLET, DELAYED RELEASE ORAL at 06:33

## 2023-01-28 RX ADMIN — Medication 10 ML: at 09:11

## 2023-01-28 RX ADMIN — Medication 3 ML: at 22:09

## 2023-01-28 RX ADMIN — Medication 10 ML: at 22:09

## 2023-01-28 RX ADMIN — ZINC OXIDE 1 APPLICATION: 200 OINTMENT TOPICAL at 22:09

## 2023-01-28 RX ADMIN — DIBASIC SODIUM PHOSPHATE, MONOBASIC POTASSIUM PHOSPHATE AND MONOBASIC SODIUM PHOSPHATE 1 TABLET: 852; 155; 130 TABLET ORAL at 15:49

## 2023-01-28 RX ADMIN — ACETAMINOPHEN 650 MG: 325 TABLET, FILM COATED ORAL at 09:00

## 2023-01-28 RX ADMIN — LEVETIRACETAM 250 MG: 100 SOLUTION ORAL at 22:09

## 2023-01-28 RX ADMIN — AMLODIPINE BESYLATE 10 MG: 10 TABLET ORAL at 08:59

## 2023-01-28 RX ADMIN — INSULIN LISPRO 2 UNITS: 100 INJECTION, SOLUTION INTRAVENOUS; SUBCUTANEOUS at 12:41

## 2023-01-28 RX ADMIN — CARVEDILOL 3.12 MG: 3.12 TABLET, FILM COATED ORAL at 18:55

## 2023-01-28 RX ADMIN — FOLIC ACID 1 MG: 1 TABLET ORAL at 09:00

## 2023-01-28 RX ADMIN — SERTRALINE 100 MG: 100 TABLET, FILM COATED ORAL at 09:01

## 2023-01-28 RX ADMIN — EPOETIN ALFA-EPBX 10000 UNITS: 10000 INJECTION, SOLUTION INTRAVENOUS; SUBCUTANEOUS at 12:41

## 2023-01-28 RX ADMIN — ASPIRIN 81 MG: 81 TABLET, COATED ORAL at 08:59

## 2023-01-28 RX ADMIN — ZINC OXIDE 1 APPLICATION: 200 OINTMENT TOPICAL at 09:03

## 2023-01-29 ENCOUNTER — READMISSION MANAGEMENT (OUTPATIENT)
Dept: CALL CENTER | Facility: HOSPITAL | Age: 58
End: 2023-01-29
Payer: MEDICARE

## 2023-01-29 VITALS
BODY MASS INDEX: 22.23 KG/M2 | HEART RATE: 75 BPM | TEMPERATURE: 97.6 F | OXYGEN SATURATION: 97 % | SYSTOLIC BLOOD PRESSURE: 151 MMHG | RESPIRATION RATE: 16 BRPM | HEIGHT: 62 IN | WEIGHT: 120.81 LBS | DIASTOLIC BLOOD PRESSURE: 68 MMHG

## 2023-01-29 LAB
GLUCOSE BLDC GLUCOMTR-MCNC: 275 MG/DL (ref 70–130)
GLUCOSE BLDC GLUCOMTR-MCNC: 327 MG/DL (ref 70–130)

## 2023-01-29 PROCEDURE — 63710000001 INSULIN LISPRO (HUMAN) PER 5 UNITS: Performed by: STUDENT IN AN ORGANIZED HEALTH CARE EDUCATION/TRAINING PROGRAM

## 2023-01-29 PROCEDURE — 82962 GLUCOSE BLOOD TEST: CPT

## 2023-01-29 RX ORDER — CARVEDILOL 3.12 MG/1
3.12 TABLET ORAL 2 TIMES DAILY WITH MEALS
Qty: 60 TABLET | Refills: 0 | Status: SHIPPED | OUTPATIENT
Start: 2023-01-29

## 2023-01-29 RX ORDER — LEVETIRACETAM 250 MG/1
250 TABLET ORAL 2 TIMES DAILY
Qty: 60 TABLET | Refills: 0 | Status: SHIPPED | OUTPATIENT
Start: 2023-01-29

## 2023-01-29 RX ORDER — LEVOTHYROXINE SODIUM 0.15 MG/1
300 TABLET ORAL DAILY
Qty: 60 TABLET | Refills: 0 | Status: ON HOLD | OUTPATIENT
Start: 2023-01-29 | End: 2023-03-28 | Stop reason: SDUPTHER

## 2023-01-29 RX ORDER — LIDOCAINE 50 MG/G
2 PATCH TOPICAL
Qty: 30 EACH | Refills: 0 | Status: SHIPPED | OUTPATIENT
Start: 2023-01-30 | End: 2023-02-01

## 2023-01-29 RX ORDER — ASPIRIN 81 MG/1
81 TABLET ORAL DAILY
Qty: 30 TABLET | Refills: 0 | Status: ON HOLD | OUTPATIENT
Start: 2023-01-30 | End: 2023-03-10

## 2023-01-29 RX ADMIN — AMLODIPINE BESYLATE 10 MG: 10 TABLET ORAL at 09:01

## 2023-01-29 RX ADMIN — FOLIC ACID 1 MG: 1 TABLET ORAL at 09:00

## 2023-01-29 RX ADMIN — ASPIRIN 81 MG: 81 TABLET, COATED ORAL at 09:01

## 2023-01-29 RX ADMIN — OXYCODONE AND ACETAMINOPHEN 1 TABLET: 5; 325 TABLET ORAL at 00:46

## 2023-01-29 RX ADMIN — PANTOPRAZOLE SODIUM 40 MG: 40 TABLET, DELAYED RELEASE ORAL at 06:11

## 2023-01-29 RX ADMIN — LEVETIRACETAM 250 MG: 100 SOLUTION ORAL at 09:01

## 2023-01-29 RX ADMIN — OXYCODONE AND ACETAMINOPHEN 1 TABLET: 5; 325 TABLET ORAL at 10:35

## 2023-01-29 RX ADMIN — DIBASIC SODIUM PHOSPHATE, MONOBASIC POTASSIUM PHOSPHATE AND MONOBASIC SODIUM PHOSPHATE 1 TABLET: 852; 155; 130 TABLET ORAL at 00:46

## 2023-01-29 RX ADMIN — SERTRALINE 100 MG: 100 TABLET, FILM COATED ORAL at 09:00

## 2023-01-29 RX ADMIN — LEVOTHYROXINE SODIUM 275 MCG: 0.17 TABLET ORAL at 06:10

## 2023-01-29 RX ADMIN — Medication 10 ML: at 09:00

## 2023-01-29 RX ADMIN — ZINC OXIDE 1 APPLICATION: 200 OINTMENT TOPICAL at 09:05

## 2023-01-29 RX ADMIN — DIBASIC SODIUM PHOSPHATE, MONOBASIC POTASSIUM PHOSPHATE AND MONOBASIC SODIUM PHOSPHATE 1 TABLET: 852; 155; 130 TABLET ORAL at 09:00

## 2023-01-29 RX ADMIN — INSULIN LISPRO 4 UNITS: 100 INJECTION, SOLUTION INTRAVENOUS; SUBCUTANEOUS at 12:14

## 2023-01-29 RX ADMIN — CARVEDILOL 3.12 MG: 3.12 TABLET, FILM COATED ORAL at 09:00

## 2023-01-29 RX ADMIN — INSULIN LISPRO 5 UNITS: 100 INJECTION, SOLUTION INTRAVENOUS; SUBCUTANEOUS at 09:01

## 2023-01-30 NOTE — OUTREACH NOTE
Prep Survey    Flowsheet Row Responses   [facility] facility patient discharged from? [place]   Is LACE score < 7 ? No   Eligibility Readm Mgmt   Discharge diagnosis Acute hypercapnic resp failure, cystoscopy/left stent replacement, left renal artery embolization   Does the patient have one of the following disease processes/diagnoses(primary or secondary)? Other   Does the patient have Home health ordered? Yes   What is the [facility] health agency[facility]   Is there a DME ordered? No   General alerts for this patient ESRD on HD   Prep survey completed? Yes          MEGAN Alexandre Registered Nurse

## 2023-02-01 ENCOUNTER — HOSPITAL ENCOUNTER (OUTPATIENT)
Facility: HOSPITAL | Age: 58
Setting detail: OBSERVATION
LOS: 1 days | Discharge: NURSING, REHAB OR LONG-TERM CARE | End: 2023-02-11
Attending: EMERGENCY MEDICINE | Admitting: INTERNAL MEDICINE
Payer: MEDICARE

## 2023-02-01 ENCOUNTER — READMISSION MANAGEMENT (OUTPATIENT)
Dept: CALL CENTER | Facility: HOSPITAL | Age: 58
End: 2023-02-01
Payer: MEDICARE

## 2023-02-01 ENCOUNTER — APPOINTMENT (OUTPATIENT)
Dept: GENERAL RADIOLOGY | Facility: HOSPITAL | Age: 58
End: 2023-02-01
Payer: MEDICARE

## 2023-02-01 DIAGNOSIS — R82.81 PYURIA: Primary | ICD-10-CM

## 2023-02-01 DIAGNOSIS — R53.1 WEAKNESS: ICD-10-CM

## 2023-02-01 DIAGNOSIS — F41.1 GAD (GENERALIZED ANXIETY DISORDER): ICD-10-CM

## 2023-02-01 LAB
ALBUMIN SERPL-MCNC: 2.9 G/DL (ref 3.5–5.2)
ALBUMIN/GLOB SERPL: 0.9 G/DL
ALP SERPL-CCNC: 226 U/L (ref 39–117)
ALT SERPL W P-5'-P-CCNC: <5 U/L (ref 1–33)
ANION GAP SERPL CALCULATED.3IONS-SCNC: 11 MMOL/L (ref 5–15)
AST SERPL-CCNC: 12 U/L (ref 1–32)
B PARAPERT DNA SPEC QL NAA+PROBE: NOT DETECTED
B PERT DNA SPEC QL NAA+PROBE: NOT DETECTED
BACTERIA UR QL AUTO: ABNORMAL /HPF
BASOPHILS # BLD AUTO: 0.05 10*3/MM3 (ref 0–0.2)
BASOPHILS NFR BLD AUTO: 0.4 % (ref 0–1.5)
BILIRUB SERPL-MCNC: 0.6 MG/DL (ref 0–1.2)
BILIRUB UR QL STRIP: NEGATIVE
BUN SERPL-MCNC: 25 MG/DL (ref 6–20)
BUN/CREAT SERPL: 8 (ref 7–25)
C PNEUM DNA NPH QL NAA+NON-PROBE: NOT DETECTED
CALCIUM SPEC-SCNC: 8.2 MG/DL (ref 8.6–10.5)
CHLORIDE SERPL-SCNC: 96 MMOL/L (ref 98–107)
CLARITY UR: ABNORMAL
CO2 SERPL-SCNC: 26 MMOL/L (ref 22–29)
COLOR UR: YELLOW
CREAT SERPL-MCNC: 3.13 MG/DL (ref 0.57–1)
D-LACTATE SERPL-SCNC: 0.9 MMOL/L (ref 0.5–2)
DEPRECATED RDW RBC AUTO: 48.7 FL (ref 37–54)
EGFRCR SERPLBLD CKD-EPI 2021: 16.7 ML/MIN/1.73
EOSINOPHIL # BLD AUTO: 0.01 10*3/MM3 (ref 0–0.4)
EOSINOPHIL NFR BLD AUTO: 0.1 % (ref 0.3–6.2)
ERYTHROCYTE [DISTWIDTH] IN BLOOD BY AUTOMATED COUNT: 16.1 % (ref 12.3–15.4)
FLUAV SUBTYP SPEC NAA+PROBE: NOT DETECTED
FLUBV RNA ISLT QL NAA+PROBE: NOT DETECTED
GLOBULIN UR ELPH-MCNC: 3.1 GM/DL
GLUCOSE BLDC GLUCOMTR-MCNC: 100 MG/DL (ref 70–130)
GLUCOSE SERPL-MCNC: 190 MG/DL (ref 65–99)
GLUCOSE UR STRIP-MCNC: ABNORMAL MG/DL
HADV DNA SPEC NAA+PROBE: NOT DETECTED
HCOV 229E RNA SPEC QL NAA+PROBE: NOT DETECTED
HCOV HKU1 RNA SPEC QL NAA+PROBE: NOT DETECTED
HCOV NL63 RNA SPEC QL NAA+PROBE: NOT DETECTED
HCOV OC43 RNA SPEC QL NAA+PROBE: NOT DETECTED
HCT VFR BLD AUTO: 36.2 % (ref 34–46.6)
HGB BLD-MCNC: 11.2 G/DL (ref 12–15.9)
HGB UR QL STRIP.AUTO: ABNORMAL
HMPV RNA NPH QL NAA+NON-PROBE: NOT DETECTED
HOLD SPECIMEN: NORMAL
HOLD SPECIMEN: NORMAL
HPIV1 RNA ISLT QL NAA+PROBE: NOT DETECTED
HPIV2 RNA SPEC QL NAA+PROBE: NOT DETECTED
HPIV3 RNA NPH QL NAA+PROBE: NOT DETECTED
HPIV4 P GENE NPH QL NAA+PROBE: NOT DETECTED
HYALINE CASTS UR QL AUTO: ABNORMAL /LPF
IMM GRANULOCYTES # BLD AUTO: 0.11 10*3/MM3 (ref 0–0.05)
IMM GRANULOCYTES NFR BLD AUTO: 0.8 % (ref 0–0.5)
KETONES UR QL STRIP: NEGATIVE
LEUKOCYTE ESTERASE UR QL STRIP.AUTO: ABNORMAL
LIPASE SERPL-CCNC: 7 U/L (ref 13–60)
LYMPHOCYTES # BLD AUTO: 0.99 10*3/MM3 (ref 0.7–3.1)
LYMPHOCYTES NFR BLD AUTO: 7.1 % (ref 19.6–45.3)
M PNEUMO IGG SER IA-ACNC: NOT DETECTED
MCH RBC QN AUTO: 26.2 PG (ref 26.6–33)
MCHC RBC AUTO-ENTMCNC: 30.9 G/DL (ref 31.5–35.7)
MCV RBC AUTO: 84.8 FL (ref 79–97)
MONOCYTES # BLD AUTO: 1 10*3/MM3 (ref 0.1–0.9)
MONOCYTES NFR BLD AUTO: 7.1 % (ref 5–12)
NEUTROPHILS NFR BLD AUTO: 11.85 10*3/MM3 (ref 1.7–7)
NEUTROPHILS NFR BLD AUTO: 84.5 % (ref 42.7–76)
NITRITE UR QL STRIP: NEGATIVE
NRBC BLD AUTO-RTO: 0 /100 WBC (ref 0–0.2)
PH UR STRIP.AUTO: 7 [PH] (ref 5–8)
PHOSPHATE SERPL-MCNC: 2.9 MG/DL (ref 2.5–4.5)
PLATELET # BLD AUTO: 330 10*3/MM3 (ref 140–450)
PMV BLD AUTO: 9.4 FL (ref 6–12)
POTASSIUM SERPL-SCNC: 3.7 MMOL/L (ref 3.5–5.2)
PROT SERPL-MCNC: 6 G/DL (ref 6–8.5)
PROT UR QL STRIP: ABNORMAL
RBC # BLD AUTO: 4.27 10*6/MM3 (ref 3.77–5.28)
RBC # UR STRIP: ABNORMAL /HPF
REF LAB TEST METHOD: ABNORMAL
RHINOVIRUS RNA SPEC NAA+PROBE: NOT DETECTED
RSV RNA NPH QL NAA+NON-PROBE: NOT DETECTED
SARS-COV-2 RNA NPH QL NAA+NON-PROBE: NOT DETECTED
SODIUM SERPL-SCNC: 133 MMOL/L (ref 136–145)
SP GR UR STRIP: 1.01 (ref 1–1.03)
SQUAMOUS #/AREA URNS HPF: ABNORMAL /HPF
UROBILINOGEN UR QL STRIP: ABNORMAL
WBC # UR STRIP: ABNORMAL /HPF
WBC NRBC COR # BLD: 14.01 10*3/MM3 (ref 3.4–10.8)
WHOLE BLOOD HOLD COAG: NORMAL
WHOLE BLOOD HOLD SPECIMEN: NORMAL

## 2023-02-01 PROCEDURE — 84100 ASSAY OF PHOSPHORUS: CPT | Performed by: NURSE PRACTITIONER

## 2023-02-01 PROCEDURE — 0202U NFCT DS 22 TRGT SARS-COV-2: CPT | Performed by: NURSE PRACTITIONER

## 2023-02-01 PROCEDURE — 99285 EMERGENCY DEPT VISIT HI MDM: CPT

## 2023-02-01 PROCEDURE — P9612 CATHETERIZE FOR URINE SPEC: HCPCS

## 2023-02-01 PROCEDURE — 36415 COLL VENOUS BLD VENIPUNCTURE: CPT

## 2023-02-01 PROCEDURE — 87040 BLOOD CULTURE FOR BACTERIA: CPT | Performed by: NURSE PRACTITIONER

## 2023-02-01 PROCEDURE — 85025 COMPLETE CBC W/AUTO DIFF WBC: CPT | Performed by: NURSE PRACTITIONER

## 2023-02-01 PROCEDURE — 80053 COMPREHEN METABOLIC PANEL: CPT | Performed by: NURSE PRACTITIONER

## 2023-02-01 PROCEDURE — 87340 HEPATITIS B SURFACE AG IA: CPT | Performed by: HOSPITALIST

## 2023-02-01 PROCEDURE — 83690 ASSAY OF LIPASE: CPT | Performed by: NURSE PRACTITIONER

## 2023-02-01 PROCEDURE — G0378 HOSPITAL OBSERVATION PER HR: HCPCS

## 2023-02-01 PROCEDURE — 82962 GLUCOSE BLOOD TEST: CPT

## 2023-02-01 PROCEDURE — 25010000002 CEFTRIAXONE PER 250 MG: Performed by: NURSE PRACTITIONER

## 2023-02-01 PROCEDURE — 96375 TX/PRO/DX INJ NEW DRUG ADDON: CPT

## 2023-02-01 PROCEDURE — 96361 HYDRATE IV INFUSION ADD-ON: CPT

## 2023-02-01 PROCEDURE — 71045 X-RAY EXAM CHEST 1 VIEW: CPT

## 2023-02-01 PROCEDURE — 84439 ASSAY OF FREE THYROXINE: CPT | Performed by: STUDENT IN AN ORGANIZED HEALTH CARE EDUCATION/TRAINING PROGRAM

## 2023-02-01 PROCEDURE — 81001 URINALYSIS AUTO W/SCOPE: CPT | Performed by: NURSE PRACTITIONER

## 2023-02-01 PROCEDURE — 83605 ASSAY OF LACTIC ACID: CPT | Performed by: NURSE PRACTITIONER

## 2023-02-01 PROCEDURE — 96365 THER/PROPH/DIAG IV INF INIT: CPT

## 2023-02-01 PROCEDURE — 25010000002 ONDANSETRON PER 1 MG: Performed by: NURSE PRACTITIONER

## 2023-02-01 RX ORDER — AMLODIPINE BESYLATE 10 MG/1
10 TABLET ORAL
Status: DISCONTINUED | OUTPATIENT
Start: 2023-02-02 | End: 2023-02-11 | Stop reason: HOSPADM

## 2023-02-01 RX ORDER — LEVETIRACETAM 250 MG/1
250 TABLET ORAL 2 TIMES DAILY
Status: DISCONTINUED | OUTPATIENT
Start: 2023-02-01 | End: 2023-02-11 | Stop reason: HOSPADM

## 2023-02-01 RX ORDER — LACTULOSE 10 G/15ML
10 SOLUTION ORAL 2 TIMES DAILY
Status: DISCONTINUED | OUTPATIENT
Start: 2023-02-01 | End: 2023-02-08

## 2023-02-01 RX ORDER — ASPIRIN 81 MG/1
81 TABLET ORAL DAILY
Status: DISCONTINUED | OUTPATIENT
Start: 2023-02-02 | End: 2023-02-11 | Stop reason: HOSPADM

## 2023-02-01 RX ORDER — PANTOPRAZOLE SODIUM 40 MG/1
40 TABLET, DELAYED RELEASE ORAL DAILY
Status: DISCONTINUED | OUTPATIENT
Start: 2023-02-02 | End: 2023-02-11 | Stop reason: HOSPADM

## 2023-02-01 RX ORDER — FOLIC ACID 1 MG/1
1 TABLET ORAL DAILY
Status: DISCONTINUED | OUTPATIENT
Start: 2023-02-02 | End: 2023-02-11 | Stop reason: HOSPADM

## 2023-02-01 RX ORDER — LEVOTHYROXINE SODIUM 0.15 MG/1
300 TABLET ORAL
Status: DISCONTINUED | OUTPATIENT
Start: 2023-02-02 | End: 2023-02-11 | Stop reason: HOSPADM

## 2023-02-01 RX ORDER — CARVEDILOL 3.12 MG/1
3.12 TABLET ORAL 2 TIMES DAILY WITH MEALS
Status: DISCONTINUED | OUTPATIENT
Start: 2023-02-01 | End: 2023-02-04

## 2023-02-01 RX ORDER — SODIUM CHLORIDE 9 MG/ML
75 INJECTION, SOLUTION INTRAVENOUS CONTINUOUS
Status: DISCONTINUED | OUTPATIENT
Start: 2023-02-01 | End: 2023-02-02

## 2023-02-01 RX ORDER — SODIUM CHLORIDE 0.9 % (FLUSH) 0.9 %
10 SYRINGE (ML) INJECTION AS NEEDED
Status: DISCONTINUED | OUTPATIENT
Start: 2023-02-01 | End: 2023-02-11 | Stop reason: HOSPADM

## 2023-02-01 RX ORDER — INSULIN LISPRO 100 [IU]/ML
0-9 INJECTION, SOLUTION INTRAVENOUS; SUBCUTANEOUS
Status: DISCONTINUED | OUTPATIENT
Start: 2023-02-02 | End: 2023-02-11 | Stop reason: HOSPADM

## 2023-02-01 RX ORDER — SERTRALINE HYDROCHLORIDE 100 MG/1
100 TABLET, FILM COATED ORAL DAILY
Status: DISCONTINUED | OUTPATIENT
Start: 2023-02-02 | End: 2023-02-05

## 2023-02-01 RX ORDER — ONDANSETRON 2 MG/ML
4 INJECTION INTRAMUSCULAR; INTRAVENOUS ONCE
Status: COMPLETED | OUTPATIENT
Start: 2023-02-01 | End: 2023-02-01

## 2023-02-01 RX ORDER — ACETAMINOPHEN 325 MG/1
650 TABLET ORAL ONCE
Status: COMPLETED | OUTPATIENT
Start: 2023-02-02 | End: 2023-02-01

## 2023-02-01 RX ORDER — DEXTROSE MONOHYDRATE 25 G/50ML
25 INJECTION, SOLUTION INTRAVENOUS
Status: DISCONTINUED | OUTPATIENT
Start: 2023-02-01 | End: 2023-02-11 | Stop reason: HOSPADM

## 2023-02-01 RX ORDER — HYDROCODONE BITARTRATE AND ACETAMINOPHEN 5; 325 MG/1; MG/1
1 TABLET ORAL ONCE
Status: COMPLETED | OUTPATIENT
Start: 2023-02-01 | End: 2023-02-01

## 2023-02-01 RX ORDER — NICOTINE POLACRILEX 4 MG
15 LOZENGE BUCCAL
Status: DISCONTINUED | OUTPATIENT
Start: 2023-02-01 | End: 2023-02-11 | Stop reason: HOSPADM

## 2023-02-01 RX ADMIN — SODIUM CHLORIDE 250 ML: 9 INJECTION, SOLUTION INTRAVENOUS at 11:17

## 2023-02-01 RX ADMIN — LEVETIRACETAM 250 MG: 250 TABLET, FILM COATED ORAL at 21:20

## 2023-02-01 RX ADMIN — CEFTRIAXONE SODIUM 1 G: 1 INJECTION, POWDER, FOR SOLUTION INTRAMUSCULAR; INTRAVENOUS at 15:51

## 2023-02-01 RX ADMIN — ACETAMINOPHEN 650 MG: 325 TABLET, FILM COATED ORAL at 23:32

## 2023-02-01 RX ADMIN — HYDROCODONE BITARTRATE AND ACETAMINOPHEN 1 TABLET: 5; 325 TABLET ORAL at 12:12

## 2023-02-01 RX ADMIN — SODIUM CHLORIDE 75 ML/HR: 9 INJECTION, SOLUTION INTRAVENOUS at 21:19

## 2023-02-01 RX ADMIN — SODIUM CHLORIDE 1000 ML: 9 INJECTION, SOLUTION INTRAVENOUS at 11:15

## 2023-02-01 RX ADMIN — ONDANSETRON 4 MG: 2 INJECTION INTRAMUSCULAR; INTRAVENOUS at 11:10

## 2023-02-01 NOTE — CASE MANAGEMENT/SOCIAL WORK
Discharge Planning Assessment  [facility]     Patient Name: Zaina Martinez  MRN: [id number]  Today's Date: 2/1/2023    Admit Date: 2/1/2023        Discharge Needs Assessment     Row Name 02/01/23 1511       Living Environment    People in Home spouse    Name(s) of People in Home Marv Martinez- spouse    Current Living Arrangements home    Primary Care Provided by spouse/significant other;self    Provides Primary Care For no one, unable/limited ability to care for self    Family Caregiver if Needed spouse    Family Caregiver Names Marv Martinez- spouse    Quality of Family Relationships helpful;involved;supportive    Able to Return to Prior Arrangements no       Resource/Environmental Concerns    Resource/Environmental Concerns none       Food Insecurity    Within the past 12 months, you worried that your food would run out before you got the money to buy more. Never true    Within the past 12 months, the food you bought just didn't last and you didn't have money to get more. Never true       Transition Planning    Patient/Family Anticipates Transition to inpatient rehabilitation facility    Patient/Family Anticipated Services at Transition ;rehabilitation services    Transportation Anticipated public transportation;family or friend will provide       Discharge Needs Assessment    Readmission Within the Last 30 Days previous discharge plan unsuccessful    Current Outpatient/Agency/Support Group outpatient hemodialysis;other (see comments)  [facility]    Equipment Currently Used at Home bath bench;cane, straight;commode;ramp;glucometer;hospital bed;oxygen    Concerns to be Addressed discharge planning;decision-making    Anticipated Changes Related to Illness inability to care for self    Equipment Needed After Discharge none    Outpatient/Agency/Support Group Needs skilled nursing facility;inpatient rehabilitation facility    Discharge Facility/Level of Care Needs rehabilitation facility;nursing  "facility, skilled    Provided Post Acute Provider List? Yes    Delivered To Support Person    Support Person Marvlele Martinez- spouse    Method of Delivery In person    Current Discharge Risk chronically ill;dependent with mobility/activities of daily living;physical impairment               Discharge Plan     Row Name 02/01/23 7977       Plan    Plan Comments Updated patient who referred me to spouse- Call placed to spouse as he is no longer at bedside- Updated spouse who would like to talk with daughter about referral to another facility. CCP to follow.    Row Name 02/01/23 6708       Plan    Plan Comments [facility] has no beds at this time- will need other choices for referrals.    Row Name 02/01/23 0899       Plan    Plan Comments --    Row Name 02/01/23 8066       Plan    Plan Comments Entered room, introduced self and explained role w/PPE in place on self, patient and spouse- Marv at bedside; verified information on facesheet;- patient lives in a one level home w/spouse w/ramp to enter- is disabled, and dependent w/most ADL's- spouse Marv is primary caregiver- (spouse also assists w/care of grandchildren and works.\" Patient goes to hemodialysis at [facility] on [place] MWF: Patient usually ambulates w/assist of rolling walker- additionally, patient has a cane, bedside commode, shower chair, glucometer, hospital bed and home oxygen through [employer]. Patient sees [facility] in the home and RX are filled at [facility]. [facility] spouse reports patient has had severe and increasing weakness with diminished appetite since d/c- he verbalized concern for Pt. dehydration and unable to get her to dialysis. Patient agreed with spouse concerns- discussed d/c needs- patient and spouse are both in agreement that she needs to transfer to sub acute rehab short term- Provided RTR- requested [facility] (needs a facility that can do dialysis)- referral placed and call placed " to liaison.              Continued Care and Services - Admitted Since 2/1/2023     Destination     Service Provider Request Status Selected Services Address Phone Fax Patient Preferred    [facility] Pending - Request Sent N/A [address] [id number] [phone] --            Selected Continued Care - Prior Encounters Includes continued care and service providers with selected services from prior encounters from 11/3/2022 to 2/1/2023    Discharged on 1/29/2023 Admission date: 12/26/2022 - Discharge disposition: Home or Self Care    Home Medical Care     Service Provider Selected Services Address Phone Fax Patient Preferred    [facility] [address] [place] [address] [phone] --                Discharged on 12/9/2022 Admission date: 12/4/2022 - Discharge disposition: Home-Health Care [facility] [place]    Home Medical Care     Service Provider Selected Services Address Phone Fax Patient Preferred    [facility] [address] [place] [address] [phone] --                Discharged on 11/24/2022 Admission date: 11/8/2022 - Discharge disposition: Skilled Nursing Facility (DC - External)    Destination     Service Provider Selected Services Address Phone Fax Patient Preferred    [facility] [address] [address] [phone] --                       Demographic Summary     Row Name 02/01/23 1506       General Information    Admission Type observation    Arrived From home    Referral Source other (see comments)  ER provider    Reason for Consult discharge planning    Preferred Language English       Contact Information    Permission Granted to Share Info With ;family/designee;facility                Functional Status     Row Name 02/01/23 5963        Functional Status    Usual Activity Tolerance fair    Current Activity Tolerance poor       Functional Status, IADL    Medications completely dependent    Meal Preparation completely dependent    Housekeeping completely dependent    Laundry completely dependent    Shopping completely dependent       Mental Status    General Appearance WDL WDL       Mental Status Summary    Mental Status Comments weakness/lethargy       Employment/    Employment Status disabled               Psychosocial    No documentation.                Abuse/Neglect    No documentation.                Legal    No documentation.                Substance Abuse    No documentation.                Patient Forms    No documentation.                   Jeannette Willingham RN

## 2023-02-01 NOTE — ED TRIAGE NOTES
Pt was recently discharge from hospital Sunday. Pt reports increased generalized weakness, unable to get around, pt not wanting to eat. Pt states when she does eat, she becomes nausea. Pt reports N/V/D.     Pt wearing proper PPE along with staff.

## 2023-02-01 NOTE — OUTREACH NOTE
Medical Week 1 Survey    Flowsheet Row Responses   [facility] patient discharged from? [facility]   Does the patient have one of the following disease processes/diagnoses(primary or secondary)? Other   Week 1 attempt successful? No  [currently in ER]   Unsuccessful attempts Attempt 1          YOANNA JUNG - Registered Nurse

## 2023-02-01 NOTE — ED PROVIDER NOTES
EMERGENCY DEPARTMENT ENCOUNTER    Room Number:  05/05  Date seen:  2/1/2023  PCP: Dane Polo  Historian: Patient      HPI:  Chief Complaint: Nausea with vomiting  A complete HPI/ROS/PMH/PSH/SH/FH are unobtainable due to: Vague historian  Context: Zaina Martinez is a 57 y.o. female who presents to the ED c/o       Discharged from hospital here 3 days ago  Having n/v/d  Lives at home with  and grand children  States her  reported a subjective fever  States she is making urine  States she is having low back pain and leg cramping  States she has a mild dry cough   Had dialysis Monday, due to go today  Access to [facility]  Nephrologistdarcy      PAST MEDICAL HISTORY  Active Ambulatory Problems     Diagnosis Date Noted   • Renal insufficiency 03/16/2018   • Hypertensive disorder 08/25/2021   • Hypothyroidism 11/29/2021   • Type 2 diabetes mellitus with kidney complication, with long-term current use of insulin ([facility]) 05/01/2018   • Rheumatoid arthritis ([facility]) 03/30/2021   • Angioedema 11/30/2021   • Esophageal dysmotility 10/15/2021   • Anemia 03/16/2018   • Medically noncompliant 12/01/2021   • Myocardial infarction due to demand ischemia ([facility]) 12/01/2021   • Enteritis 12/03/2021   • PRES (posterior reversible encephalopathy syndrome) 12/05/2021   • Urine retention 12/08/2021   • Klebsiella infection 12/08/2021   • Superficial thrombophlebitis 12/10/2021   • Generalized weakness 12/19/2021   • ESRD (end stage renal disease) ([facility]) 12/20/2021   • CAD (coronary artery disease) 12/20/2021   • Abnormal urinalysis 12/20/2021   • Chronic diastolic CHF (congestive heart failure) ([facility]) 12/25/2021   • Pyelonephritis 01/09/2022   • Calculus of gallbladder with acute on chronic cholecystitis without obstruction 01/09/2022   • Pleural effusion on right 01/09/2022   • Anemia due to chronic kidney disease, on chronic dialysis ([facility]) 01/11/2022   • Abnormal findings on diagnostic imaging of other specified body  structures 04/04/2017   • Acute upper respiratory infection 10/31/2018   • Agitation 03/30/2021   • Alkaline phosphatase raised 03/16/2018   • Casts present in urine 03/29/2021   • Cellulitis of toe 11/15/2019   • Hip pain 06/19/2020   • Community acquired pneumonia 02/13/2017   • Depressive disorder 10/31/2018   • Diarrhea of presumed infectious origin 03/30/2021   • Difficult or painful urination 08/11/2020   • Disease due to severe acute respiratory syndrome coronavirus 2 (SARS-CoV-2) 03/30/2021   • Dyspnea 11/15/2019   • Encounter for follow-up examination after completed treatment for conditions other than malignant neoplasm 02/13/2017   • H/O: hypothyroidism 08/25/2021   • Hyperlipidemia 11/30/2018   • Hypomagnesemia 03/30/2021   • Intractable vomiting with nausea 03/29/2021   • 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   • Need for influenza vaccination 11/30/2018   • Restless legs 11/15/2019   • Noncompliance with treatment 10/31/2018   • Shoulder pain 06/19/2020   • Acute UTI (urinary tract infection) 07/17/2018   • Metabolic encephalopathy 02/24/2022   • Abnormal findings on diagnostic imaging of abdomen 02/24/2022   • Status post cholecystectomy 02/24/2022   • Hyponatremia 02/24/2022   • Acute metabolic encephalopathy 04/27/2022   • Encephalopathy, toxic 04/28/2022   • Acute CVA (cerebrovascular accident) (HCC) 05/01/2022   • History of intracranial hemorrhage 05/01/2022   • Stroke ([facility]) 05/13/2022   • Abnormal urinalysis 06/24/2022   • Diabetic muscle infarction ([facility]) 07/01/2022   • Other insomnia 07/01/2022   • Altered mental status 07/27/2022   • History of stroke- R MCA s/p TPA with subsequent ICH with debility 07/27/2022   • Heart murmur 07/27/2022   • Bradycardia 07/27/2022   • Left lower lobe pneumonia 07/27/2022   • Metabolic encephalopathy 07/29/2022   • Pericardial effusion 08/20/2022   • Pleural effusion 08/20/2022   • Acute pulmonary edema ([facility]) 09/03/2022   • Atrial flutter ([facility]) 09/04/2022   • Chronic  systolic CHF (congestive heart failure) ([facility]) 09/04/2022   • Thrombus of venous dialysis catheter ([facility]) 09/04/2022   • Sepsis without acute organ dysfunction ([facility]) 09/27/2022   • Type 2 diabetes mellitus with hyperglycemia, with long-term current use of insulin ([facility]) 10/06/2022   • Acute respiratory failure with hypoxia ([facility]) 10/06/2022   • Acute on chronic systolic CHF (congestive heart failure) ([facility]) 10/06/2022   • Hyperkalemia 10/12/2022   • Acute respiratory failure with hypoxia ([facility]) 10/27/2022   • Other hypervolemia 10/31/2022   • Hematoma of right hip, initial encounter 11/09/2022   • Ureteral stent present 11/09/2022   • Closed intertrochanteric fracture of right femur ([facility]) 11/09/2022   • Witnessed seizure-like activity ([facility]) 12/04/2022   • Acute respiratory failure with hypercapnia ([facility]) 12/26/2022   • Opioid use 12/26/2022   • Diabetic muscle infarction ([facility]) 12/30/2022   • Acute posthemorrhagic anemia 12/31/2022   • Kidney hematoma 12/31/2022     Resolved Ambulatory Problems     Diagnosis Date Noted   • Hypertensive urgency 11/30/2021   • Leukocytosis 03/16/2018   • Leukocytosis 02/24/2022     Past Medical History:   Diagnosis Date   • Acute on chronic diastolic CHF (congestive heart failure) ([facility])    • Diabetes ([facility])    • Disease of thyroid gland    • GERD (gastroesophageal reflux disease)    • Hypertension    • Stage 5 chronic kidney disease ([facility])          PAST SURGICAL HISTORY  Past Surgical History:   Procedure Laterality Date   • CHOLECYSTECTOMY WITH INTRAOPERATIVE CHOLANGIOGRAM N/A 1/10/2022    Procedure: Laparoscopic cholecystectomy with intraoperative cholangiogram;  Surgeon: Ramana Raygoza MD;  Location: [facility];  Service: General;  Laterality: N/A;   • CYSTOSCOPY W/ URETERAL STENT PLACEMENT Left 9/29/2022    Procedure: CYSTOSCOPY URETERAL STENT INSERTION WITH RETROGRADE PYELOGRAM;  Surgeon: Bryant Phan Jr., MD;  Location: [facility] OR;  Service: Urology;  Laterality: Left;    • CYSTOSCOPY W/ URETERAL STENT PLACEMENT Left 1/10/2023    Procedure: CYSTOSCOPY LEFT STENT REMOVAL;  Surgeon: Bryant Phan Jr., MD;  Location: [facility];  Service: Urology;  Laterality: Left;   • EMBOLIZATION MESENTERIC ARTERY N/A 1/1/2023    Procedure: LEFT KIDNEY EMBOLIZATION;  Surgeon: Bijan Ordoñez MD;  Location: [facility] OR 18/19;  Service: Interventional Radiology;  Laterality: N/A;   • EYE SURGERY     • FEMUR IM NAILING/RODDING Right 11/10/2022    Procedure: FEMUR INTRAMEDULLARY NAILING/RODDING;  Surgeon: Glen Pierce MD;  Location: [facility];  Service: Orthopedics;  Laterality: Right;   • HIP INTERTROCHANTERIC NAILING Right 11/10/2022    Procedure: HIP INTERTROCHANTERIC NAILING;  Surgeon: Glen Pierce MD;  Location: [facility];  Service: Orthopedics;  Laterality: Right;   • HYSTERECTOMY     • INSERT CENTRAL LINE AT BEDSIDE  12/31/2022        • INSERTION HEMODIALYSIS CATHETER N/A 12/6/2021    Procedure: HEMODIALYSIS CATHETER INSERTION;  Surgeon: Keli Salazar MD;  Location: [facility] OR 18/19;  Service: Vascular;  Laterality: N/A;   • INSERTION HEMODIALYSIS CATHETER N/A 5/3/2022    Procedure: TUNNELED CATHETER PLACEMENT;  Surgeon: Keli Salazar MD;  Location: [facility];  Service: Vascular;  Laterality: N/A;   • MUSCLE BIOPSY Left 6/15/2022    Procedure: Left quadriceps muscle biopsy;  Surgeon: Marques Ma MD;  Location: [facility];  Service: Neurosurgery;  Laterality: Left;   • URETEROSCOPY LASER LITHOTRIPSY WITH STENT INSERTION Left 12/30/2022    Procedure: CYSTOSCOPY WITH LEFT  URETEROSCOPY  LEFT STENT EXCHANGE;  Surgeon: Bryant Phan Jr., MD;  Location: [facility];  Service: Urology;  Laterality: Left;         FAMILY HISTORY  Family History   Problem Relation Age of Onset   • Malig Hyperthermia Neg Hx          SOCIAL HISTORY  Social History     Socioeconomic History   • Marital status:      Spouse  name: Marv   Tobacco Use   • Smoking status: Never   • Smokeless tobacco: Never   Vaping Use   • Vaping Use: Never used   Substance and Sexual Activity   • Alcohol use: Never   • Drug use: Never   • Sexual activity: Defer         ALLERGIES  Contrast dye (echo or unknown ct/mr), Ibuprofen, and Prochlorperazine        REVIEW OF SYSTEMS  Review of Systems   Constitutional: Positive for activity change and fatigue.   HENT: Negative.    Eyes: Negative.    Respiratory: Negative.    Gastrointestinal: Positive for diarrhea, nausea and vomiting.   Endocrine: Negative.    Genitourinary: Negative.    Musculoskeletal: Negative.    Skin: Negative.    Allergic/Immunologic: Negative.    Neurological: Negative.    Psychiatric/Behavioral: Negative.           PHYSICAL EXAM  ED Triage Vitals [02/01/23 0926]   Temp Heart Rate Resp BP SpO2   98.2 °F (36.8 °C) 81 18 162/85 96 %      Temp src Heart Rate Source Patient Position BP Location FiO2 (%)   -- Monitor -- Right arm --       Physical Exam      GENERAL: Alert and oriented x4, no acute distress, appears older than stated age and appears chronically ill  HENT: nares patent, mucous membranes are moist and intact  EYES: no scleral icterus or injection  CV: regular rhythm, normal rate, no murmur or gallop, no peripheral edema appreciated  Chest wall: Tunneled cath right anterior chest without redness or erythema  RESPIRATORY: normal effort, clear to auscultation all breath fields bilaterally  ABDOMEN: soft and nontender diffusely, no rebound or guarding, bowel sounds WNL  MUSCULOSKELETAL: no deformity  NEURO: alert, moves all extremities, follows commands  PSYCH:  calm, cooperative  SKIN: warm, dry and intact      Vital signs and nursing notes reviewed.          LAB RESULTS  Recent Results (from the past 24 hour(s))   Comprehensive Metabolic Panel    Collection Time: 02/01/23 11:09 AM    Specimen: Blood   Result Value Ref Range    Glucose 190 (H) 65 - 99 mg/dL    BUN 25 (H) 6 - 20 mg/dL     Creatinine 3.13 (H) 0.57 - 1.00 mg/dL    Sodium 133 (L) 136 - 145 mmol/L    Potassium 3.7 3.5 - 5.2 mmol/L    Chloride 96 (L) 98 - 107 mmol/L    CO2 26.0 22.0 - 29.0 mmol/L    Calcium 8.2 (L) 8.6 - 10.5 mg/dL    Total Protein 6.0 6.0 - 8.5 g/dL    Albumin 2.9 (L) 3.5 - 5.2 g/dL    ALT (SGPT) <5 1 - 33 U/L    AST (SGOT) 12 1 - 32 U/L    Alkaline Phosphatase 226 (H) 39 - 117 U/L    Total Bilirubin 0.6 0.0 - 1.2 mg/dL    Globulin 3.1 gm/dL    A/G Ratio 0.9 g/dL    BUN/Creatinine Ratio 8.0 7.0 - 25.0    Anion Gap 11.0 5.0 - 15.0 mmol/L    eGFR 16.7 (L) >60.0 mL/min/1.73   Lipase    Collection Time: 02/01/23 11:09 AM    Specimen: Blood   Result Value Ref Range    Lipase 7 (L) 13 - 60 U/L   Lactic Acid, Plasma    Collection Time: 02/01/23 11:09 AM    Specimen: Blood   Result Value Ref Range    Lactate 0.9 0.5 - 2.0 mmol/L   Phosphorus    Collection Time: 02/01/23 11:09 AM    Specimen: Blood   Result Value Ref Range    Phosphorus 2.9 2.5 - 4.5 mg/dL   Green Top (Gel)    Collection Time: 02/01/23 11:09 AM   Result Value Ref Range    Extra Tube Hold for add-ons.    Lavender Top    Collection Time: 02/01/23 11:09 AM   Result Value Ref Range    Extra Tube hold for add-on    Gold Top - SST    Collection Time: 02/01/23 11:09 AM   Result Value Ref Range    Extra Tube Hold for add-ons.    Light Blue Top    Collection Time: 02/01/23 11:09 AM   Result Value Ref Range    Extra Tube Hold for add-ons.    CBC Auto Differential    Collection Time: 02/01/23 11:09 AM    Specimen: Blood   Result Value Ref Range    WBC 14.01 (H) 3.40 - 10.80 10*3/mm3    RBC 4.27 3.77 - 5.28 10*6/mm3    Hemoglobin 11.2 (L) 12.0 - 15.9 g/dL    Hematocrit 36.2 34.0 - 46.6 %    MCV 84.8 79.0 - 97.0 fL    MCH 26.2 (L) 26.6 - 33.0 pg    MCHC 30.9 (L) 31.5 - 35.7 g/dL    RDW 16.1 (H) 12.3 - 15.4 %    RDW-SD 48.7 37.0 - 54.0 fl    MPV 9.4 6.0 - 12.0 fL    Platelets 330 140 - 450 10*3/mm3    Neutrophil % 84.5 (H) 42.7 - 76.0 %    Lymphocyte % 7.1 (L) 19.6 - 45.3 %     Monocyte % 7.1 5.0 - 12.0 %    Eosinophil % 0.1 (L) 0.3 - 6.2 %    Basophil % 0.4 0.0 - 1.5 %    Immature Grans % 0.8 (H) 0.0 - 0.5 %    Neutrophils, Absolute 11.85 (H) 1.70 - 7.00 10*3/mm3    Lymphocytes, Absolute 0.99 0.70 - 3.10 10*3/mm3    Monocytes, Absolute 1.00 (H) 0.10 - 0.90 10*3/mm3    Eosinophils, Absolute 0.01 0.00 - 0.40 10*3/mm3    Basophils, Absolute 0.05 0.00 - 0.20 10*3/mm3    Immature Grans, Absolute 0.11 (H) 0.00 - 0.05 10*3/mm3    nRBC 0.0 0.0 - 0.2 /100 WBC   Respiratory Panel PCR w/COVID-19(SARS-CoV-2) [facility] In-House, NP Swab in [facility], 3-4 HR TAT - Swab, Nasopharynx    Collection Time: 02/01/23  2:10 PM    Specimen: Nasopharynx; Swab   Result Value Ref Range    ADENOVIRUS, PCR Not Detected Not Detected    Coronavirus 229E Not Detected Not Detected    Coronavirus HKU1 Not Detected Not Detected    Coronavirus NL63 Not Detected Not Detected    Coronavirus OC43 Not Detected Not Detected    COVID19 Not Detected Not Detected - Ref. Range    Human Metapneumovirus Not Detected Not Detected    Human Rhinovirus/Enterovirus Not Detected Not Detected    Influenza A PCR Not Detected Not Detected    Influenza B PCR Not Detected Not Detected    Parainfluenza Virus 1 Not Detected Not Detected    Parainfluenza Virus 2 Not Detected Not Detected    Parainfluenza Virus 3 Not Detected Not Detected    Parainfluenza Virus 4 Not Detected Not Detected    RSV, PCR Not Detected Not Detected    Bordetella pertussis pcr Not Detected Not Detected    Bordetella parapertussis PCR Not Detected Not Detected    Chlamydophila pneumoniae PCR Not Detected Not Detected    Mycoplasma pneumo by PCR Not Detected Not Detected   Urinalysis With Microscopic If Indicated (No Culture) - Straight Cath    Collection Time: 02/01/23  2:23 PM    Specimen: Straight Cath; Urine   Result Value Ref Range    Color, UA Yellow Yellow, Straw    Appearance, UA Turbid (A) Clear    pH, UA 7.0 5.0 - 8.0    Specific Gravity, UA  1.015 1.005 - 1.030    Glucose,  mg/dL (Trace) (A) Negative    Ketones, UA Negative Negative    Bilirubin, UA Negative Negative    Blood, UA Large (3+) (A) Negative    Protein, UA >=300 mg/dL (3+) (A) Negative    Leuk Esterase, UA Large (3+) (A) Negative    Nitrite, UA Negative Negative    Urobilinogen, UA 0.2 E.U./dL 0.2 - 1.0 E.U./dL   Urinalysis, Microscopic Only - Straight Cath    Collection Time: 02/01/23  2:23 PM    Specimen: Straight Cath; Urine   Result Value Ref Range    RBC, UA None Seen None Seen, 0-2 /HPF    WBC, UA Too Numerous to Count (A) None Seen, 0-2 /HPF    Bacteria, UA Trace (A) None Seen /HPF    Squamous Epithelial Cells, UA 0-2 None Seen, 0-2 /HPF    Hyaline Casts, UA None Seen None Seen /LPF    Methodology Automated Microscopy        Ordered the above labs and reviewed the results.        RADIOLOGY  XR Chest 1 View    Result Date: 2/1/2023  XR CHEST 1 VW-  HISTORY: Female who is 57 years-old,  cough  TECHNIQUE: Frontal view of the chest  COMPARISON: 01/22/2023  FINDINGS: Right-sided central venous catheter appears stable. NG tube has been removed. Patient is rotated towards the right. The heart appears mildly enlarged. Pulmonary vasculature unremarkable. Small left basilar atelectasis/infiltrate/effusion, appears decreased from the prior exam. No pneumothorax. No acute osseous process.      Small left basilar atelectasis/infiltrate/effusion, appears decreased from the prior exam.  This report was finalized on 2/1/2023 11:29 AM by Dr. Jared Kern M.D.        I independently interpreted the chest x-ray which shows no evidence of acute pneumothorax or new infiltrate            PROCEDURES  Procedures          MEDICATIONS GIVEN IN ER  Medications   sodium chloride 0.9 % flush 10 mL (has no administration in time range)   sodium chloride 0.9 % bolus 250 mL (250 mL Intravenous New Bag 2/1/23 1117)   ondansetron (ZOFRAN) injection 4 mg (4 mg Intravenous Given 2/1/23 1110)    HYDROcodone-acetaminophen (NORCO) 5-325 MG per tablet 1 tablet (1 tablet Oral Given 2/1/23 1212)                   MEDICAL DECISION MAKING, PROGRESS, and CONSULTS    All labs have been independently reviewed by me.  All radiology studies have been reviewed by me and I have also reviewed the radiology report.   EKG's independently viewed and interpreted by me.  Discussion below represents my analysis of pertinent findings related to patient's condition, differential diagnosis, treatment plan and final disposition.      Additional sources:  - Discussed/ obtained information from independent historians: History obtained from patient and her spouse including profound weakness causing difficulty ambulating    - External (non-ED) record review: 1/10/2023 cystoscopy with left stent removal performed here by Dr. Bowling    - Chronic or social conditions impacting care: None, patient has good social support    - Shared decision making: I have offered patient outpatient rehab placement and she reports she does not feel safe to go home due to her generalized weakness, CCP has spoken with her and her  and plan to admit to the hospital for urinary tract infection and work on short-term rehab placement tomorrow with [facility]      Orders placed during this visit:  Orders Placed This Encounter   Procedures   • Respiratory Panel PCR w/COVID-19(SARS-CoV-2) [facility] In-House, NP Swab in UTM/VTM, 3-4 HR TAT - Swab, Nasopharynx   • Blood Culture - Blood,   • Blood Culture - Blood,   • XR Chest 1 View   • [place] Draw   • Comprehensive Metabolic Panel   • Lipase   • Urinalysis With Microscopic If Indicated (No Culture) - Urine, Clean Catch   • Lactic Acid, Plasma   • Phosphorus   • CBC Auto Differential   • Urinalysis, Microscopic Only - Urine, Clean Catch   • Orthostatic Blood Pressure   • Cardiac Monitoring   • LHA (on-call MD unless specified) Details   • PT Consult: Eval & Treat Functional Mobility Below  Baseline   • Insert Peripheral IV   • Initiate Observation Status   • CBC & Differential   • Green Top (Gel)   • Lavender Top   • Gold Top - SST   • Light Blue Top         Additional orders considered but not ordered:  I have considered blood transfusion but given stability of chronic anemia and end-stage renal disease I think this would not be beneficial and not indicated at this juncture        Differential diagnosis:    My differential diagnosis for generalized weakness includes but is not limited to:  Neuromuscular weakness   CVA  Hemorrhagic stroke  Multiple sclerosis  Amyotrophic Lateral Sclerosis (ALS) (UMN & LMN)  Spinal and bulbar muscular atrophy (Guille's syndrome)  Spinal cord disease: Infection (Epidural abscess)  Infarction/ischemia  Trauma (Spinal Cord Syndromes)  Inflammation (Transverse Myelitis)  Degenerative (Spinal muscular atrophy)  Tumor  Peripheral nerve disease: Guillain-Burlington syndrome  Toxins ([place])  Tick paralysis  Diabetic peripheral neuropathy  NMJ disease: Myasthenia gravis crisis  Botulism  Organophosphate toxicity  Lambert-Eaton myasthenic syndrome  Rhabdomyolysis  Dermatomyositis  Polymyositis  Alcoholic myopathy  Non-neuromuscular weakness   ACS  Arrhythmia/Syncope  Severe infection/Sepsis  Hypoglycemia  Periodic paralysis (electrolyte disturbance, K, Mg, Ca)   Hypokalemic periodic paralysis  Thyrotoxic periodic paralysis  Respiratory failure  Symptomatic Anemia  Severe dehydration  Hypothyroidism  Polypharmacy  Malignancy            Independent interpretation of labs, radiology studies, and discussions with consultants:  ED Course as of 02/01/23 1615   Wed Feb 01, 2023   1236 XR Chest 1 View  My independent interpretation of the chest x-ray is no dense consolidation.  Right-sided dialysis catheter in place. [TR]   1524 WBC(!): 14.01 [AH]      ED Course User Index  [AH] Jeanne Tariq APRN  [TR] Saud Keita MD             I have worn appropriate PPE during this patient  encounter, sanitized my hands both with entering and exiting patient's room.      DIAGNOSIS  Final diagnoses:   Pyuria   Weakness         DISPOSITION  Admitted to [facility] service            Latest Documented Vital Signs:  As of 09:37 EST  BP- 162/85 HR- 81 Temp- 98.2 °F (36.8 °C) O2 sat- 96%              --    Please note that portions of this were completed with a voice recognition program.       Note Disclaimer: At [facility], we believe that sharing information builds trust and better relationships. You are receiving this note because you are receiving care at [facility] or recently visited. It is possible you will see health information before a provider has talked with you about it. This kind of information can be easy to misunderstand. To help you fully understand what it means for your health, we urge you to discuss this note with your provider.           Jeanne Tariq, RIKA  02/01/23 7638

## 2023-02-01 NOTE — CASE MANAGEMENT/SOCIAL WORK
Discharge Planning Assessment  [facility]     Patient Name: Zaina Martinez  MRN: [id number]  Today's Date: 2/1/2023    Admit Date: 2/1/2023        Discharge Needs Assessment     Row Name 02/01/23 1511       Living Environment    People in Home spouse    Name(s) of People in Home Marv Martinez- spouse    Current Living Arrangements home    Primary Care Provided by spouse/significant other;self    Provides Primary Care For no one, unable/limited ability to care for self    Family Caregiver if Needed spouse    Family Caregiver Names Marv Martinez- spouse    Quality of Family Relationships helpful;involved;supportive    Able to Return to Prior Arrangements no       Resource/Environmental Concerns    Resource/Environmental Concerns none       Food Insecurity    Within the past 12 months, you worried that your food would run out before you got the money to buy more. Never true    Within the past 12 months, the food you bought just didn't last and you didn't have money to get more. Never true       Transition Planning    Patient/Family Anticipates Transition to inpatient rehabilitation facility    Patient/Family Anticipated Services at Transition ;rehabilitation services    Transportation Anticipated public transportation;family or friend will provide       Discharge Needs Assessment    Readmission Within the Last 30 Days previous discharge plan unsuccessful    Current Outpatient/Agency/Support Group outpatient hemodialysis;other (see comments)  [facility]    Equipment Currently Used at Home bath bench;cane, straight;commode;ramp;glucometer;hospital bed;oxygen    Concerns to be Addressed discharge planning;decision-making    Anticipated Changes Related to Illness inability to care for self    Equipment Needed After Discharge none    Outpatient/Agency/Support Group Needs skilled nursing facility;inpatient rehabilitation facility    Discharge Facility/Level of Care Needs rehabilitation facility;nursing  "facility, skilled    Provided Post Acute Provider List? Yes    Delivered To Support Person    Support Person Marv Martinez- spouse    Method of Delivery In person    Current Discharge Risk chronically ill;dependent with mobility/activities of daily living;physical impairment               Discharge Plan     Row Name 02/01/23 8909       Plan    Plan Comments [place] has no beds at this time- will need other choices for referrals.    Row Name 02/01/23 7955       Plan    Plan Comments --    Row Name 02/01/23 7055       Plan    Plan Comments Entered room, introduced self and explained role w/PPE in place on self, patient and spouse- Marv at bedside; verified information on facesheet;- patient lives in a one level home w/spouse w/ramp to enter- is disabled, and dependent w/most ADL's- spouse Marv is primary caregiver- (spouse also assists w/care of grandchildren and works.\" Patient goes to hemodialysis at [facility] on [address] MWF: Patient usually ambulates w/assist of rolling walker- additionally, patient has a cane, bedside commode, shower chair, glucometer, hospital bed and home oxygen through [employer]. Patient sees [facility] in the home and RX are filled at [facility] in [facility]. Patient was recently d/c from [facility]- spouse reports patient has had severe and increasing weakness with diminished appetite since d/c- he verbalized concern for Pt. dehydration and unable to get her to dialysis. Patient agreed with spouse concerns- discussed d/c needs- patient and spouse are both in agreement that she needs to transfer to sub acute rehab short term- Provided RTR- requested [facility] (needs a facility that can do dialysis)- referral placed and call placed to liaison.              Continued Care and Services - Admitted Since 2/1/2023     Destination     Service Provider Request Status Selected Services Address Phone Fax Patient Preferred    [facility] [place] Pending - Request Sent N/A [address], [facility] [address] [phone] --            Selected Continued Care - Prior Encounters Includes continued care and service providers with selected services from prior encounters from 11/3/2022 to 2/1/2023    Discharged on 1/29/2023 Admission date: 12/26/2022 - Discharge disposition: Home or Self Care    Home Medical Care     Service Provider Selected Services Address Phone Fax Patient Preferred    [facility] [address] [address] [phone] --                Discharged on 12/9/2022 Admission date: 12/4/2022 - Discharge disposition: Home-Health Care Svc    Home Medical Care     Service Provider Selected Services Address Phone Fax Patient Preferred    [facility] [address] [facility] [address] [phone] --                Discharged on 11/24/2022 Admission date: 11/8/2022 - Discharge disposition: Skilled Nursing Facility (DC - External)    Destination     Service Provider Selected Services Address Phone Fax Patient Preferred    [facility] [address] [phone] --                       Demographic Summary     Row Name 02/01/23 1506       General Information    Admission Type observation    Arrived From home    Referral Source other (see comments)  ER provider    Reason for Consult discharge planning    Preferred Language English       Contact Information    Permission Granted to Share Info With ;family/designee;facility                Functional Status     Row Name 02/01/23 1510       Functional Status    Usual Activity Tolerance fair    Current Activity Tolerance poor       Functional Status, IADL    Medications completely dependent    Meal Preparation completely dependent    Housekeeping completely dependent    Laundry  completely dependent    Shopping completely dependent       Mental Status    General Appearance WDL WDL       Mental Status Summary    Mental Status Comments weakness/lethargy       Employment/    Employment Status disabled               Psychosocial    No documentation.                Abuse/Neglect    No documentation.                Legal    No documentation.                Substance Abuse    No documentation.                Patient Forms    No documentation.                   Jeannette Willingham RN

## 2023-02-01 NOTE — ED NOTES
This RN attempted IV insertion with blood draw, unsuccessful. Pt requested that this RN not stick again and get someone with US. AMY Juárez notified.

## 2023-02-01 NOTE — CASE MANAGEMENT/SOCIAL WORK
Discharge Planning Assessment  [facility]     Patient Name: Zaina Martinez  MRN: [id number]  Today's Date: 2/1/2023    Admit Date: 2/1/2023        Discharge Needs Assessment     Row Name 02/01/23 1511       Living Environment    People in Home spouse    Name(s) of People in Home Marv Martinez- spouse    Current Living Arrangements home    Primary Care Provided by spouse/significant other;self    Provides Primary Care For no one, unable/limited ability to care for self    Family Caregiver if Needed spouse    Family Caregiver Names Marv Martinez- spouse    Quality of Family Relationships helpful;involved;supportive    Able to Return to Prior Arrangements no       Resource/Environmental Concerns    Resource/Environmental Concerns none       Food Insecurity    Within the past 12 months, you worried that your food would run out before you got the money to buy more. Never true    Within the past 12 months, the food you bought just didn't last and you didn't have money to get more. Never true       Transition Planning    Patient/Family Anticipates Transition to inpatient rehabilitation facility    Patient/Family Anticipated Services at Transition ;rehabilitation services    Transportation Anticipated public transportation;family or friend will provide       Discharge Needs Assessment    Readmission Within the Last 30 Days previous discharge plan unsuccessful    Current Outpatient/Agency/Support Group outpatient hemodialysis;other (see comments)  [facility]    Equipment Currently Used at Home bath bench;cane, straight;commode;ramp;glucometer;hospital bed;oxygen    Concerns to be Addressed discharge planning;decision-making    Anticipated Changes Related to Illness inability to care for self    Equipment Needed After Discharge none    Outpatient/Agency/Support Group Needs skilled nursing facility;inpatient rehabilitation facility    Discharge Facility/Level of Care Needs rehabilitation facility;nursing  "facility, skilled    Provided Post Acute Provider List? Yes    Delivered To Support Person    Support Person Marv Juan- spouse    Method of Delivery In person    Current Discharge Risk chronically ill;dependent with mobility/activities of daily living;physical impairment               Discharge Plan     Row Name 02/01/23 1888       Plan    Plan Comments Entered room, introduced self and explained role w/PPE in place on self, patient and spouse- Marv at bedside; verified information on facesheet;- patient lives in a one level home w/spouse w/ramp to enter- is disabled, and dependent w/most ADL's- spouse Marv is primary caregiver- (spouse also assists w/care of grandchildren and works.\" Patient goes to hemodialysis at [facility] on [address] MWF: Patient usually ambulates w/assist of rolling walker- additionally, patient has a cane, bedside commode, shower chair, glucometer, hospital bed and home oxygen through [employer]. Patient sees [facility] in the home and RX are filled at [facility] in [place], [place]. Patient was recently d/c from [facility]- spouse reports patient has had severe and increasing weakness with diminished appetite since d/c- he verbalized concern for Pt. dehydration and unable to get her to dialysis. Patient agreed with spouse concerns- discussed d/c needs- patient and spouse are both in agreement that she needs to transfer to sub acute rehab short term- Provided RTR- requested [place] (needs a facility that can do dialysis)- referral placed and call placed to liaison.              Continued Care and Services - Admitted Since 2/1/2023     Destination     Service Provider Request Status Selected Services Address Phone Fax Patient Preferred    [facility] [place] Pending - Request Sent N/A [address] [facility] [address] [phone] --            Selected Continued Care - Prior Encounters Includes continued care and service providers with selected services " from prior encounters from 11/3/2022 to 2/1/2023    Discharged on 1/29/2023 Admission date: 12/26/2022 - Discharge disposition: Home or Self Care    Home Medical Care     Service Provider Selected Services Address Phone Fax Patient Preferred    [facility] [address] [address] [phone] --                Discharged on 12/9/2022 Admission date: 12/4/2022 - Discharge disposition: Home-Health Care [facility]    Home Medical Care     Service Provider Selected Services Address Phone Fax Patient Preferred    [facility] [place] [facility] [address] [address] [address] [phone] --                Discharged on 11/24/2022 Admission date: 11/8/2022 - Discharge disposition: Skilled Nursing Facility (DC - External)    Destination     Service Provider Selected Services Address Phone Fax Patient Preferred    [facility] [address] [phone] --                       Demographic Summary     Row Name 02/01/23 1506       General Information    Admission Type observation    Arrived From home    Referral Source other (see comments)  ER provider    Reason for Consult discharge planning    Preferred Language English       Contact Information    Permission Granted to Share Info With ;family/designee;facility                Functional Status     Row Name 02/01/23 1510       Functional Status    Usual Activity Tolerance fair    Current Activity Tolerance poor       Functional Status, IADL    Medications completely dependent    Meal Preparation completely dependent    Housekeeping completely dependent    Laundry completely dependent    Shopping completely dependent       Mental Status    General Appearance WDL WDL       Mental Status Summary    Mental Status Comments weakness/lethargy        Employment/    Employment Status disabled               Psychosocial    No documentation.                Abuse/Neglect    No documentation.                Legal    No documentation.                Substance Abuse    No documentation.                Patient Forms    No documentation.                   Jeannette Willingham RN

## 2023-02-01 NOTE — DISCHARGE PLACEMENT REQUEST
"Zaina Martinez (57 y.o. Female)     Date of Birth   1965    Social Security Number       Address   [address] [place] [address]    Home Phone   [phone]    MRN   [id number]       Sikhism   None    Marital Status                               Admission Date   2/1/23    Admission Type   Emergency    Admitting Provider       Attending Provider   Saud Keita MD    Department, Room/Bed   [facility] Emergency Department, 05/05       Discharge Date       Discharge Disposition       Discharge Destination                               Attending Provider: Saud Keita MD    Allergies: Contrast Dye (Echo Or Unknown Ct/mr), Ibuprofen, Prochlorperazine    Isolation: None   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)   Code Status: Prior    Ht: 157.5 cm (62\")   Wt: 62.4 kg (137 lb 8 oz)    Admission Cmt: None   Principal Problem: None                Active Insurance as of 2/1/2023     Primary Coverage     Payor Plan Insurance Group Employer/Plan Group    ANTH MEDICARE REPLACEMENT ANTH MEDICARE ADVANTAGE [id number]     Payor Plan Address Payor Plan Phone Number Payor Plan Fax Number Effective Dates    [address] [phone]  1/1/2019 - None Entered    [facility] [address]       Subscriber Name Subscriber Birth Date Member ID       ZAINA MARTINEZ 1965 [id number]                 Emergency Contacts      (Rel.) Home Phone Work Phone Mobile Phone    Fiona Martinez (Daughter) [phone] -- [phone]    Chemo Martinezian (Spouse) -- -- [phone]              "

## 2023-02-01 NOTE — ED PROVIDER NOTES
MD ATTESTATION NOTE    The AMA and I have discussed this patient's history, physical exam, and treatment plan.  I have reviewed the documentation and personally had a face to face interaction with the patient. I affirm the documentation and agree with the treatment and plan.  The attached note describes my personal findings.    I provided a substantive portion of the care of this patient. I personally performed the physical exam, in its entirety.    Independent Historians: Patient, EMS    A complete HPI/ROS/PMH/PSH/SH/FH are unobtainable due to: Nothing    Chronic or social conditions impacting patient care (social determinants of health): None    Zaina Martinez is a 57 y.o. female who presents to the ED c/o having no appetite as well as not tolerating anything by mouth since discharge from the hospital on Sunday.  She reports that since Sunday she has had no appetite.  She states she has not been able to keep anything down.  She reports nausea and vomiting.  She reports she went to dialysis on Monday.  She has not been to dialysis today.  She reports generalized weakness.      Review of prior external notes (non-ED): Discharge summary dated 1/29/2023 with hypercapnic respiratory failure, anemia, kidney hematoma, atrial flutter, hyponatremia    Review of prior external test results outside of this encounter: Her last laboratory evaluation was on 1/28/2023 with a CBC that showed leukocytosis of 16.8 thousand, BMP that showed a low phosphorus of 1.3, low calcium of 8.1.    On exam:  GENERAL: Awake, alert, no acute distress, frail-appearing  SKIN: Warm, dry  HENT: Normocephalic, atraumatic  EYES: no scleral icterus  CV: regular rhythm, regular rate  RESPIRATORY: normal effort, lungs clear  ABDOMEN: soft, nontender, nondistended  MUSCULOSKELETAL: no deformity  NEURO: alert, moves all extremities, follows commands    Labs  Recent Results (from the past 24 hour(s))   Comprehensive Metabolic Panel    Collection Time: 02/01/23  11:09 AM    Specimen: Blood   Result Value Ref Range    Glucose 190 (H) 65 - 99 mg/dL    BUN 25 (H) 6 - 20 mg/dL    Creatinine 3.13 (H) 0.57 - 1.00 mg/dL    Sodium 133 (L) 136 - 145 mmol/L    Potassium 3.7 3.5 - 5.2 mmol/L    Chloride 96 (L) 98 - 107 mmol/L    CO2 26.0 22.0 - 29.0 mmol/L    Calcium 8.2 (L) 8.6 - 10.5 mg/dL    Total Protein 6.0 6.0 - 8.5 g/dL    Albumin 2.9 (L) 3.5 - 5.2 g/dL    ALT (SGPT) <5 1 - 33 U/L    AST (SGOT) 12 1 - 32 U/L    Alkaline Phosphatase 226 (H) 39 - 117 U/L    Total Bilirubin 0.6 0.0 - 1.2 mg/dL    Globulin 3.1 gm/dL    A/G Ratio 0.9 g/dL    BUN/Creatinine Ratio 8.0 7.0 - 25.0    Anion Gap 11.0 5.0 - 15.0 mmol/L    eGFR 16.7 (L) >60.0 mL/min/1.73   Lipase    Collection Time: 02/01/23 11:09 AM    Specimen: Blood   Result Value Ref Range    Lipase 7 (L) 13 - 60 U/L   Lactic Acid, Plasma    Collection Time: 02/01/23 11:09 AM    Specimen: Blood   Result Value Ref Range    Lactate 0.9 0.5 - 2.0 mmol/L   Phosphorus    Collection Time: 02/01/23 11:09 AM    Specimen: Blood   Result Value Ref Range    Phosphorus 2.9 2.5 - 4.5 mg/dL   Green Top (Gel)    Collection Time: 02/01/23 11:09 AM   Result Value Ref Range    Extra Tube Hold for add-ons.    Lavender Top    Collection Time: 02/01/23 11:09 AM   Result Value Ref Range    Extra Tube hold for add-on    Gold Top - SST    Collection Time: 02/01/23 11:09 AM   Result Value Ref Range    Extra Tube Hold for add-ons.    Light Blue Top    Collection Time: 02/01/23 11:09 AM   Result Value Ref Range    Extra Tube Hold for add-ons.    CBC Auto Differential    Collection Time: 02/01/23 11:09 AM    Specimen: Blood   Result Value Ref Range    WBC 14.01 (H) 3.40 - 10.80 10*3/mm3    RBC 4.27 3.77 - 5.28 10*6/mm3    Hemoglobin 11.2 (L) 12.0 - 15.9 g/dL    Hematocrit 36.2 34.0 - 46.6 %    MCV 84.8 79.0 - 97.0 fL    MCH 26.2 (L) 26.6 - 33.0 pg    MCHC 30.9 (L) 31.5 - 35.7 g/dL    RDW 16.1 (H) 12.3 - 15.4 %    RDW-SD 48.7 37.0 - 54.0 fl    MPV 9.4 6.0 - 12.0 fL     Platelets 330 140 - 450 10*3/mm3    Neutrophil % 84.5 (H) 42.7 - 76.0 %    Lymphocyte % 7.1 (L) 19.6 - 45.3 %    Monocyte % 7.1 5.0 - 12.0 %    Eosinophil % 0.1 (L) 0.3 - 6.2 %    Basophil % 0.4 0.0 - 1.5 %    Immature Grans % 0.8 (H) 0.0 - 0.5 %    Neutrophils, Absolute 11.85 (H) 1.70 - 7.00 10*3/mm3    Lymphocytes, Absolute 0.99 0.70 - 3.10 10*3/mm3    Monocytes, Absolute 1.00 (H) 0.10 - 0.90 10*3/mm3    Eosinophils, Absolute 0.01 0.00 - 0.40 10*3/mm3    Basophils, Absolute 0.05 0.00 - 0.20 10*3/mm3    Immature Grans, Absolute 0.11 (H) 0.00 - 0.05 10*3/mm3    nRBC 0.0 0.0 - 0.2 /100 WBC   Respiratory Panel PCR w/COVID-19(SARS-CoV-2) NA[facility] In-House, NP Swab in UTM/VTM, 3-4 HR TAT - Swab, Nasopharynx    Collection Time: 02/01/23  2:10 PM    Specimen: Nasopharynx; Swab   Result Value Ref Range    ADENOVIRUS, PCR Not Detected Not Detected    Coronavirus 229E Not Detected Not Detected    Coronavirus HKU1 Not Detected Not Detected    Coronavirus NL63 Not Detected Not Detected    Coronavirus OC43 Not Detected Not Detected    COVID19 Not Detected Not Detected - Ref. Range    Human Metapneumovirus Not Detected Not Detected    Human Rhinovirus/Enterovirus Not Detected Not Detected    Influenza A PCR Not Detected Not Detected    Influenza B PCR Not Detected Not Detected    Parainfluenza Virus 1 Not Detected Not Detected    Parainfluenza Virus 2 Not Detected Not Detected    Parainfluenza Virus 3 Not Detected Not Detected    Parainfluenza Virus 4 Not Detected Not Detected    RSV, PCR Not Detected Not Detected    Bordetella pertussis pcr Not Detected Not Detected    Bordetella parapertussis PCR Not Detected Not Detected    Chlamydophila pneumoniae PCR Not Detected Not Detected    Mycoplasma pneumo by PCR Not Detected Not Detected       Radiology  XR Chest 1 View    Result Date: 2/1/2023  XR CHEST 1 VW-  HISTORY: Female who is 57 years-old,  cough  TECHNIQUE: Frontal view of the chest  COMPARISON:  01/22/2023  FINDINGS: Right-sided central venous catheter appears stable. NG tube has been removed. Patient is rotated towards the right. The heart appears mildly enlarged. Pulmonary vasculature unremarkable. Small left basilar atelectasis/infiltrate/effusion, appears decreased from the prior exam. No pneumothorax. No acute osseous process.      Small left basilar atelectasis/infiltrate/effusion, appears decreased from the prior exam.  This report was finalized on 2/1/2023 11:29 AM by Dr. Jared Kern M.D.        Medical Decision Making:  ED Course as of 02/01/23 1537   Wed Feb 01, 2023   1236 XR Chest 1 View  My independent interpretation of the chest x-ray is no dense consolidation.  Right-sided dialysis catheter in place. [TR]   1524 WBC(!): 14.01 [AH]      ED Course User Index  [AH] Jeanne Tariq APRN  [TR] Saud Keita MD       Plan to check chemistries, blood counts, phosphorus, lactic acid.  We will give her a small fluid bolus.  We will give her IV antiemetics and reevaluate.  We will obtain chest x-ray to help evaluate volume status given that she has not been to dialysis today.     Procedures:  Procedures      PPE: The patient wore a mask and I wore an N95 mask throughout the entire patient encounter.      The patient qualifies to receive the vaccine, but they have not yet received it.    Diagnosis  Final diagnoses:   Pyuria   Weakness       Note Disclaimer: At [facility], we believe that sharing information builds trust and better relationships. You are receiving this note because you recently visited [facility]. It is possible you will see health information before a provider has talked with you about it. This kind of information can be easy to misunderstand. To help you fully understand what it means for your health, we urge you to discuss this note with your provider.     Saud Keita MD  02/01/23 8842

## 2023-02-01 NOTE — ED NOTES
Nursing report ED to floor  Zaina Martinez  57 y.o.  female    HPI :   Chief Complaint   Patient presents with    Weakness - Generalized       Admitting doctor:   Starla Boston MD    Admitting diagnosis:   The primary encounter diagnosis was Pyuria. A diagnosis of Weakness was also pertinent to this visit.    Code status:   Current Code Status       Date Active Code Status Order ID Comments User Context       Prior            Allergies:   Contrast dye (echo or unknown ct/mr), Ibuprofen, and Prochlorperazine    Isolation:   No active isolations    Intake and Output    Intake/Output Summary (Last 24 hours) at 2/1/2023 1559  Last data filed at 2/1/2023 1320  Gross per 24 hour   Intake 250 ml   Output --   Net 250 ml       Weight:       02/01/23  0951   Weight: 62.4 kg (137 lb 8 oz)       Most recent vitals:   Vitals:    02/01/23 1335 02/01/23 1401 02/01/23 1431 02/01/23 1531   BP:  161/80 160/96 162/80   BP Location:       Pulse: 72 72 74 72   Resp:       Temp:       SpO2: 99% 100% 99% 97%   Weight:       Height:           Active LDAs/IV Access:   Lines, Drains & Airways       Active LDAs       Name Placement date Placement time Site Days    Peripheral IV 02/01/23 1110 Right Antecubital 02/01/23  1110  Antecubital  less than 1    Hemodialysis Cath Double 05/03/22  0909  Internal Jugular  274                    Labs (abnormal labs have a star):   Labs Reviewed   COMPREHENSIVE METABOLIC PANEL - Abnormal; Notable for the following components:       Result Value    Glucose 190 (*)     BUN 25 (*)     Creatinine 3.13 (*)     Sodium 133 (*)     Chloride 96 (*)     Calcium 8.2 (*)     Albumin 2.9 (*)     Alkaline Phosphatase 226 (*)     eGFR 16.7 (*)     All other components within normal limits    Narrative:     GFR Normal >60  Chronic Kidney Disease <60  Kidney Failure <15     LIPASE - Abnormal; Notable for the following components:    Lipase 7 (*)     All other components within normal limits   URINALYSIS W/  MICROSCOPIC IF INDICATED (NO CULTURE) - Abnormal; Notable for the following components:    Appearance, UA Turbid (*)     Glucose,  mg/dL (Trace) (*)     Blood, UA Large (3+) (*)     Protein, UA >=300 mg/dL (3+) (*)     Leuk Esterase, UA Large (3+) (*)     All other components within normal limits   CBC WITH AUTO DIFFERENTIAL - Abnormal; Notable for the following components:    WBC 14.01 (*)     Hemoglobin 11.2 (*)     MCH 26.2 (*)     MCHC 30.9 (*)     RDW 16.1 (*)     Neutrophil % 84.5 (*)     Lymphocyte % 7.1 (*)     Eosinophil % 0.1 (*)     Immature Grans % 0.8 (*)     Neutrophils, Absolute 11.85 (*)     Monocytes, Absolute 1.00 (*)     Immature Grans, Absolute 0.11 (*)     All other components within normal limits   URINALYSIS, MICROSCOPIC ONLY - Abnormal; Notable for the following components:    WBC, UA Too Numerous to Count (*)     Bacteria, UA Trace (*)     All other components within normal limits   RESPIRATORY PANEL PCR W/ COVID-19 (SARS-COV-2) [facility]/WES/PAD/COR/[facility]AD/[facility] IN-HOUSE, NP SWAB IN [facility], 3-4 HR TAT - Normal    Narrative:     In the setting of a positive respiratory panel with a viral infection PLUS a negative procalcitonin without other underlying concern for bacterial infection, consider observing off antibiotics or discontinuation of antibiotics and continue supportive care. If the respiratory panel is positive for atypical bacterial infection (Bordetella pertussis, Chlamydophila pneumoniae, or Mycoplasma pneumoniae), consider antibiotic de-escalation to target atypical bacterial infection.   LACTIC ACID, PLASMA - Normal   PHOSPHORUS - Normal   BLOOD CULTURE   BLOOD CULTURE   RAINBOW DRAW    Narrative:     The following orders were created for panel order [facility] Draw.  Procedure                               Abnormality         Status                     ---------                               -----------         ------                     Green Top (Gel)[[id number]]                                   Final result               Lavender Top[[id number]]                                     Final result               Gold Top - SST[[id number]]                                   Final result               Light Blue Top[[id number]]                                   Final result                 Please view results for these tests on the individual orders.   CBC AND DIFFERENTIAL    Narrative:     The following orders were created for panel order CBC & Differential.  Procedure                               Abnormality         Status                     ---------                               -----------         ------                     CBC Auto Differential[[id number]]        Abnormal            Final result                 Please view results for these tests on the individual orders.   GREEN TOP   LAVENDER TOP   GOLD TOP - SST   LIGHT BLUE TOP       EKG:   No orders to display       Meds given in ED:   Medications   sodium chloride 0.9 % flush 10 mL (has no administration in time range)   cefTRIAXone (ROCEPHIN) 1 g in sodium chloride 0.9 % 100 mL IVPB-VTB (1 g Intravenous New Bag 2/1/23 1551)   sodium chloride 0.9 % bolus 250 mL (0 mL Intravenous Stopped 2/1/23 1320)   ondansetron (ZOFRAN) injection 4 mg (4 mg Intravenous Given 2/1/23 1110)   HYDROcodone-acetaminophen (NORCO) 5-325 MG per tablet 1 tablet (1 tablet Oral Given 2/1/23 1212)       Imaging results:  XR Chest 1 View    Result Date: 2/1/2023  Small left basilar atelectasis/infiltrate/effusion, appears decreased from the prior exam.  This report was finalized on 2/1/2023 11:29 AM by Dr. Jared Kern M.D.       Ambulatory status:   - bedrest      Social issues:   Social History     Socioeconomic History    Marital status:      Spouse name: Marv   Tobacco Use    Smoking status: Never    Smokeless tobacco: Never   Vaping Use    Vaping Use: Never used   Substance and Sexual Activity    Alcohol use: Never    Drug use: Never    Sexual  activity: Defer       NIH Stroke Scale:         Imani Salinas RN  02/01/23 15:59 EST

## 2023-02-01 NOTE — ED NOTES
02/01/23 1331   Respiratory Interventions (Adult)   Additional Documentation Oxygen Therapy (Group)   Oxygen Therapy   Flow (L/min) 2   Device (Oxygen Therapy) nasal cannula       Pt has hx of ZAMZAM, pt's baseline is RA. This Rn placed pt on 2L NC after pt's oxygen saturation dropped while sleeping. RA saturation while sleeping went down to 84%. Awake on RA, pt was at 95%. Pt placed on 2L NC for sleeping.

## 2023-02-02 PROBLEM — E43 SEVERE MALNUTRITION: Status: ACTIVE | Noted: 2023-02-02

## 2023-02-02 LAB
GLUCOSE BLDC GLUCOMTR-MCNC: 119 MG/DL (ref 70–130)
GLUCOSE BLDC GLUCOMTR-MCNC: 208 MG/DL (ref 70–130)
GLUCOSE BLDC GLUCOMTR-MCNC: 224 MG/DL (ref 70–130)
GLUCOSE BLDC GLUCOMTR-MCNC: 81 MG/DL (ref 70–130)
HBA1C MFR BLD: 6.2 % (ref 4.8–5.6)
HBV SURFACE AG SERPL QL IA: NORMAL
T4 FREE SERPL-MCNC: 1.12 NG/DL (ref 0.93–1.7)

## 2023-02-02 PROCEDURE — G0378 HOSPITAL OBSERVATION PER HR: HCPCS

## 2023-02-02 PROCEDURE — 82962 GLUCOSE BLOOD TEST: CPT

## 2023-02-02 PROCEDURE — G0257 UNSCHED DIALYSIS ESRD PT HOS: HCPCS

## 2023-02-02 PROCEDURE — 25010000002 ONDANSETRON PER 1 MG: Performed by: NURSE PRACTITIONER

## 2023-02-02 PROCEDURE — 87493 C DIFF AMPLIFIED PROBE: CPT | Performed by: NURSE PRACTITIONER

## 2023-02-02 PROCEDURE — 97166 OT EVAL MOD COMPLEX 45 MIN: CPT

## 2023-02-02 PROCEDURE — 97110 THERAPEUTIC EXERCISES: CPT

## 2023-02-02 PROCEDURE — 83036 HEMOGLOBIN GLYCOSYLATED A1C: CPT | Performed by: INTERNAL MEDICINE

## 2023-02-02 PROCEDURE — 96376 TX/PRO/DX INJ SAME DRUG ADON: CPT

## 2023-02-02 PROCEDURE — 25010000002 HEPARIN (PORCINE) PER 1000 UNITS: Performed by: STUDENT IN AN ORGANIZED HEALTH CARE EDUCATION/TRAINING PROGRAM

## 2023-02-02 PROCEDURE — 97530 THERAPEUTIC ACTIVITIES: CPT

## 2023-02-02 PROCEDURE — 25010000002 HEPARIN (PORCINE) PER 1000 UNITS: Performed by: HOSPITALIST

## 2023-02-02 PROCEDURE — 97162 PT EVAL MOD COMPLEX 30 MIN: CPT

## 2023-02-02 PROCEDURE — 99214 OFFICE O/P EST MOD 30 MIN: CPT | Performed by: INTERNAL MEDICINE

## 2023-02-02 PROCEDURE — 96372 THER/PROPH/DIAG INJ SC/IM: CPT

## 2023-02-02 RX ORDER — LIDOCAINE 50 MG/G
1 PATCH TOPICAL
Status: DISCONTINUED | OUTPATIENT
Start: 2023-02-02 | End: 2023-02-11 | Stop reason: HOSPADM

## 2023-02-02 RX ORDER — HEPARIN SODIUM 5000 [USP'U]/ML
5000 INJECTION, SOLUTION INTRAVENOUS; SUBCUTANEOUS EVERY 8 HOURS SCHEDULED
Status: DISCONTINUED | OUTPATIENT
Start: 2023-02-02 | End: 2023-02-11 | Stop reason: HOSPADM

## 2023-02-02 RX ORDER — HEPARIN SODIUM 1000 [USP'U]/ML
3800 INJECTION, SOLUTION INTRAVENOUS; SUBCUTANEOUS AS NEEDED
Status: DISCONTINUED | OUTPATIENT
Start: 2023-02-02 | End: 2023-02-11 | Stop reason: HOSPADM

## 2023-02-02 RX ORDER — ONDANSETRON 2 MG/ML
4 INJECTION INTRAMUSCULAR; INTRAVENOUS EVERY 6 HOURS PRN
Status: DISCONTINUED | OUTPATIENT
Start: 2023-02-02 | End: 2023-02-05

## 2023-02-02 RX ADMIN — CARVEDILOL 3.12 MG: 3.12 TABLET, FILM COATED ORAL at 09:07

## 2023-02-02 RX ADMIN — SERTRALINE 100 MG: 100 TABLET, FILM COATED ORAL at 09:07

## 2023-02-02 RX ADMIN — ASPIRIN 81 MG: 81 TABLET, COATED ORAL at 09:07

## 2023-02-02 RX ADMIN — ONDANSETRON 4 MG: 2 INJECTION INTRAMUSCULAR; INTRAVENOUS at 21:15

## 2023-02-02 RX ADMIN — HEPARIN SODIUM 3800 UNITS: 1000 INJECTION INTRAVENOUS; SUBCUTANEOUS at 17:15

## 2023-02-02 RX ADMIN — AMLODIPINE BESYLATE 10 MG: 10 TABLET ORAL at 09:07

## 2023-02-02 RX ADMIN — HEPARIN SODIUM 5000 UNITS: 5000 INJECTION INTRAVENOUS; SUBCUTANEOUS at 21:18

## 2023-02-02 RX ADMIN — LEVETIRACETAM 250 MG: 250 TABLET, FILM COATED ORAL at 09:07

## 2023-02-02 RX ADMIN — LEVETIRACETAM 250 MG: 250 TABLET, FILM COATED ORAL at 21:23

## 2023-02-02 RX ADMIN — CARVEDILOL 3.12 MG: 3.12 TABLET, FILM COATED ORAL at 18:34

## 2023-02-02 RX ADMIN — PANTOPRAZOLE SODIUM 40 MG: 40 TABLET, DELAYED RELEASE ORAL at 06:47

## 2023-02-02 RX ADMIN — FOLIC ACID 1 MG: 1 TABLET ORAL at 09:07

## 2023-02-02 RX ADMIN — LEVOTHYROXINE SODIUM 300 MCG: 0.15 TABLET ORAL at 06:49

## 2023-02-02 NOTE — PLAN OF CARE
Goal Outcome Evaluation:  Plan of Care Reviewed With: patient           Outcome Evaluation: Pt is a 56 yo female adm with weakness, recently discharged from here 1/29, pt has a complex medical history and was here for over a month with L renal hematoma, hemorrhagic shock, had a renal artery embolization, and stent removal. Hx of ESRD, on HD, DM, HTN, CVA. Pt presents with signficant weakness, pain, and impaired functional mobiltiy, she will benefit from PT to address, she required moderate/max assist today to sit edge of bed, she fatigued quickly and was not able to stand. At current level, SNF is recommended, PT will follow while pt is an inpatient

## 2023-02-02 NOTE — CONSULTS
Referring Provider: Starla Boston MD      Subjective   History of present illness: 57-year-old with history of end-stage renal disease due to diabetes and hypertension, hypothyroidism coronary artery disease and history of stroke who was recently discharged from [facility] 1/29/2023 after being admitted for respiratory failure thought to be secondary to volume overload from missing dialysis.  She had a very prolonged hospitalization and was hospitalized for over a month.  She had a left renal hematoma and developed hemorrhagic shock and received renal artery embolization followed by stent removal in January 2023.  Had plan for SNF at discharge, but family declined and took her home.  She was readmitted yesterday.  She says that she has been having poor p.o. intake, some nausea vomiting and felt warm.  No documented fever.  She denies any urinary symptoms but did say she had dark urine    Objective     Physical Exam:   Vital Signs   Temp:  [98 °F (36.7 °C)-98.7 °F (37.1 °C)] 98.3 °F (36.8 °C)  Heart Rate:  [71-81] 81  Resp:  [17-18] 17  BP: (157-177)/(77-96) 166/89    GENERAL: Awake and alert, in no acute distress.   HEENT: Oropharynx is clear. Hearing is grossly normal.   EYES: . No conjunctival injection. No lid lag.   LUNGS:normal respiratory effort.   SKIN: no cutaneous eruptions in exposed areas  Abdomen soft and nontender  PSYCHIATRIC: Appropriate mood, affect, insight, and judgment.       Results Review:      Lab Results   Component Value Date    WBC 14.01 (H) 02/01/2023    HGB 11.2 (L) 02/01/2023    HCT 36.2 02/01/2023    MCV 84.8 02/01/2023     02/01/2023     Creatinine 3.3    Chest x-ray shows a small left basilar infiltrate, likely atelectasis or effusion   blood cultures pending  Urine culture pending, urinalysis with too numerous to count white blood cells, trace bacteria, large protein and blood      A/p  1.  End-stage renal disease  2.  Leukocytosis  3.  Pyuria    White  blood cell count actually improved from where it was 5 days ago  Urinalysis showed pyuria which is not atypical given relative bladder stasis from ESRD.  I do not think she has a UTI.  I am going to stop her antibiotics and we will observe her    Thank you for this consult.  We will continue to follow along and tailor antibiotics as the patient's clinical course evolves.      Andrez Christianson MD  02/02/23  09:16 EST

## 2023-02-02 NOTE — THERAPY EVALUATION
Patient Name: Zaina Martinez  : 1965    MRN: [id number]                              Today's Date: 2023       Admit Date: 2023    Visit Dx:     ICD-10-CM ICD-9-CM   1. Pyuria  R82.81 791.9   2. Weakness  R53.1 780.79     Patient Active Problem List   Diagnosis   • Renal insufficiency   • Hypertensive disorder   • Hypothyroidism   • Type 2 diabetes mellitus with kidney complication, with long-term current use of insulin ([facility])   • Rheumatoid arthritis ([facility])   • Angioedema   • Esophageal dysmotility   • Anemia   • Medically noncompliant   • Myocardial infarction due to demand ischemia ([facility])   • Enteritis   • PRES (posterior reversible encephalopathy syndrome)   • Urine retention   • Klebsiella infection   • Superficial thrombophlebitis   • Generalized weakness   • ESRD (end stage renal disease) ([facility])   • CAD (coronary artery disease)   • Abnormal urinalysis   • Chronic diastolic CHF (congestive heart failure) ([facility])   • Pyelonephritis   • Calculus of gallbladder with acute on chronic cholecystitis without obstruction   • Pleural effusion on right   • Anemia due to chronic kidney disease, on chronic dialysis ([facility])   • Abnormal findings on diagnostic imaging of other specified body structures   • Acute upper respiratory infection   • Agitation   • Alkaline phosphatase raised   • Casts present in urine   • Cellulitis of toe   • Hip pain   • Community acquired pneumonia   • Depressive disorder   • Diarrhea of presumed infectious origin   • Difficult or painful urination   • Disease due to severe acute respiratory syndrome coronavirus 2 (SARS-CoV-2)   • Dyspnea   • Encounter for follow-up examination after completed treatment for conditions other than malignant neoplasm   • H/O: hypothyroidism   • Hyperlipidemia   • Hypomagnesemia   • Intractable vomiting with nausea   • Luetscher's syndrome   • Need for influenza vaccination   • Restless legs   • Noncompliance with treatment   • Shoulder pain   • Acute UTI  (urinary tract infection)   • Metabolic encephalopathy   • Abnormal findings on diagnostic imaging of abdomen   • Status post cholecystectomy   • Hyponatremia   • Acute metabolic encephalopathy   • Encephalopathy, toxic   • Acute CVA (cerebrovascular accident) ([facility])   • History of intracranial hemorrhage   • Stroke (HCC)   • Abnormal urinalysis   • Diabetic muscle infarction (HCC)   • Other insomnia   • Altered mental status   • History of stroke- R MCA s/p TPA with subsequent ICH with debility   • Heart murmur   • Bradycardia   • Left lower lobe pneumonia   • Metabolic encephalopathy   • Pericardial effusion   • Pleural effusion   • Acute pulmonary edema (HCC)   • Atrial flutter (HCC)   • Chronic systolic CHF (congestive heart failure) (HCC)   • Thrombus of venous dialysis catheter (HCC)   • Sepsis without acute organ dysfunction (HCC)   • Type 2 diabetes mellitus with hyperglycemia, with long-term current use of insulin (HCC)   • Acute respiratory failure with hypoxia (HCC)   • Acute on chronic systolic CHF (congestive heart failure) (HCC)   • Hyperkalemia   • Acute respiratory failure with hypoxia (HCC)   • Other hypervolemia   • Hematoma of right hip, initial encounter   • Ureteral stent present   • Closed intertrochanteric fracture of right femur (HCC)   • Witnessed seizure-like activity (HCC)   • Acute respiratory failure with hypercapnia (HCC)   • Opioid use   • Diabetic muscle infarction (HCC)   • Acute posthemorrhagic anemia   • Kidney hematoma   • Pyuria   • Severe malnutrition (HCC)     Past Medical History:   Diagnosis Date   • Acute CVA (cerebrovascular accident) (HCC) 05/01/2022   • Acute on chronic diastolic CHF (congestive heart failure) (HCC)    • Anemia    • CAD (coronary artery disease) 12/20/2021   • Diabetes (HCC)    • Disease of thyroid gland    • GERD (gastroesophageal reflux disease)    • History of intracranial hemorrhage 5/1/2022   • Hyperlipidemia 11/30/2018   • Hypertension    •  Rheumatoid arthritis (HCC)    • Stage 5 chronic kidney disease (HCC)      Past Surgical History:   Procedure Laterality Date   • CHOLECYSTECTOMY WITH INTRAOPERATIVE CHOLANGIOGRAM N/A 1/10/2022    Procedure: Laparoscopic cholecystectomy with intraoperative cholangiogram;  Surgeon: Ramana Raygoza MD;  Location: [facility];  Service: General;  Laterality: N/A;   • CYSTOSCOPY W/ URETERAL STENT PLACEMENT Left 9/29/2022    Procedure: CYSTOSCOPY URETERAL STENT INSERTION WITH RETROGRADE PYELOGRAM;  Surgeon: Bryant Phan Jr., MD;  Location: [facility] OR;  Service: Urology;  Laterality: Left;   • CYSTOSCOPY W/ URETERAL STENT PLACEMENT Left 1/10/2023    Procedure: CYSTOSCOPY LEFT STENT REMOVAL;  Surgeon: Bryant Phan Jr., MD;  Location: [facility] OR;  Service: Urology;  Laterality: Left;   • EMBOLIZATION MESENTERIC ARTERY N/A 1/1/2023    Procedure: LEFT KIDNEY EMBOLIZATION;  Surgeon: Bijan Ordoñez MD;  Location: [facility] OR 18/19;  Service: Interventional Radiology;  Laterality: N/A;   • EYE SURGERY     • FEMUR IM NAILING/RODDING Right 11/10/2022    Procedure: FEMUR INTRAMEDULLARY NAILING/RODDING;  Surgeon: Glen Pierce MD;  Location: [facility] OR;  Service: Orthopedics;  Laterality: Right;   • HIP INTERTROCHANTERIC NAILING Right 11/10/2022    Procedure: HIP INTERTROCHANTERIC NAILING;  Surgeon: Glen Pierce MD;  Location: [facility] OR;  Service: Orthopedics;  Laterality: Right;   • HYSTERECTOMY     • INSERT CENTRAL LINE AT BEDSIDE  12/31/2022        • INSERTION HEMODIALYSIS CATHETER N/A 12/6/2021    Procedure: HEMODIALYSIS CATHETER INSERTION;  Surgeon: Keli Salazar MD;  Location: [facility] OR 18/19;  Service: Vascular;  Laterality: N/A;   • INSERTION HEMODIALYSIS CATHETER N/A 5/3/2022    Procedure: TUNNELED CATHETER PLACEMENT;  Surgeon: Keli Salazar MD;  Location: [facility] OR;  Service: Vascular;  Laterality: N/A;   • MUSCLE BIOPSY Left  6/15/2022    Procedure: Left quadriceps muscle biopsy;  Surgeon: Marques Ma MD;  Location: [facility] OR;  Service: Neurosurgery;  Laterality: Left;   • URETEROSCOPY LASER LITHOTRIPSY WITH STENT INSERTION Left 12/30/2022    Procedure: CYSTOSCOPY WITH LEFT  URETEROSCOPY  LEFT STENT EXCHANGE;  Surgeon: Bryant Phan Jr., MD;  Location: [facility];  Service: Urology;  Laterality: Left;      General Information     Row Name 02/02/23 1406          OT Time and Intention    Document Type evaluation  -BS     Mode of Treatment occupational therapy;co-treatment  -BS     Row Name 02/02/23 1406          General Information    Patient Profile Reviewed yes  -BS     Prior Level of Function --  Pt recent d/c from hospital home.  -BS     Barriers to Rehab medically complex;previous functional deficit;cognitive status  -BS     Row Name 02/02/23 1406          Occupational Profile    Environmental Supports and Barriers (Occupational Profile) Tub bench per chart review  -BS     Row Name 02/02/23 1406          Living Environment    People in Home spouse  -BS     Row Name 02/02/23 1406          Cognition    Orientation Status (Cognition) oriented x 3  -BS     Row Name 02/02/23 1406          Safety Issues, Functional Mobility    Safety Issues Affecting Function (Mobility) awareness of need for assistance;insight into deficits/self-awareness;judgment;problem-solving;safety precaution awareness  -BS     Impairments Affecting Function (Mobility) endurance/activity tolerance;strength  -BS           User Key  (r) = Recorded By, (t) = Taken By, (c) = Cosigned By    Initials Name Provider Type    BS Neeta Ordoñez OT Occupational Therapist                 Mobility/ADL's     Row Name 02/02/23 1408          Bed Mobility    Bed Mobility supine-sit;sit-supine  -BS     Supine-Sit Simpson (Bed Mobility) moderate assist (50% patient effort);2 person assist  -BS     Sit-Supine Simpson (Bed Mobility) maximum assist (25% patient  effort);2 person assist  -BS     Assistive Device (Bed Mobility) head of bed elevated;draw sheet;bed rails  -BS     Comment, (Bed Mobility) Max encouragment, extended time and effort  -BS     Row Name 02/02/23 1408          Transfers    Comment, (Transfers) Pt declined further OOB activity  -BS     Row Name 02/02/23 1408          Activities of Daily Living    BADL Assessment/Intervention lower body dressing  -BS     Row Name 02/02/23 1408          Lower Body Dressing Assessment/Training    [place]xton Level (Lower Body Dressing) don;socks;dependent (less than 25% patient effort)  -BS     Position (Lower Body Dressing) edge of bed sitting  -BS           User Key  (r) = Recorded By, (t) = Taken By, (c) = Cosigned By    Initials Name Provider Type    Neeta Jacobson OT Occupational Therapist               Obj/Interventions     Row Name 02/02/23 1409          Range of Motion Comprehensive    General Range of Motion bilateral upper extremity ROM WFL  -BS     Row Name 02/02/23 1409          Strength Comprehensive (MMT)    General Manual Muscle Testing (MMT) Assessment upper extremity strength deficits identified  -BS     Comment, General Manual Muscle Testing (MMT) Assessment BUE grossly 3/5  -BS     Row Name 02/02/23 1409          Balance    Balance Assessment sitting static balance;sitting dynamic balance  -BS     Static Sitting Balance contact guard  -BS     Dynamic Sitting Balance minimal assist  -BS     Position, Sitting Balance sitting edge of bed  -BS     Balance Interventions sitting;supported;static;dynamic;occupation based/functional task  -BS           User Key  (r) = Recorded By, (t) = Taken By, (c) = Cosigned By    Initials Name Provider Type    Neeta Jacobson OT Occupational Therapist               Goals/Plan     Row Name 02/02/23 1418          Bed Mobility Goal 1 (OT)    Activity/Assistive Device (Bed Mobility Goal 1, OT) bed mobility activities, all  -BS     Paxton Level/Cues Needed (Bed  Mobility Goal 1, OT) minimum assist (75% or more patient effort)  -BS     Time Frame (Bed Mobility Goal 1, OT) long term goal (LTG);10 days  -BS     Progress/Outcomes (Bed Mobility Goal 1, OT) goal ongoing  -BS     Row Name 02/02/23 1418          Transfer Goal 1 (OT)    Activity/Assistive Device (Transfer Goal 1, OT) bed-to-chair/chair-to-bed;commode, bedside without drop arms  -BS     [place] Level/Cues Needed (Transfer Goal 1, OT) moderate assist (50-74% patient effort)  -BS     Time Frame (Transfer Goal 1, OT) long term goal (LTG);10 days  -BS     Progress/Outcome (Transfer Goal 1, OT) goal ongoing  -BS     Row Name 02/02/23 1418          Dressing Goal 1 (OT)    Activity/Device (Dressing Goal 1, OT) lower body dressing  -BS     [place]/Cues Needed (Dressing Goal 1, OT) moderate assist (50-74% patient effort)  -BS     Time Frame (Dressing Goal 1, OT) long term goal (LTG);10 days  -BS     Progress/Outcome (Dressing Goal 1, OT) goal ongoing  -BS     Row Name 02/02/23 1418          Toileting Goal 1 (OT)    Activity/Device (Toileting Goal 1, OT) adjust/manage clothing;perform perineal hygiene;commode, bedside without drop arms  -BS     [place] Level/Cues Needed (Toileting Goal 1, OT) minimum assist (75% or more patient effort)  -BS     Time Frame (Toileting Goal 1, OT) long term goal (LTG);10 days  -BS     Progress/Outcome (Toileting Goal 1, OT) goal ongoing  -BS     Row Name 02/02/23 1418          Grooming Goal 1 (OT)    Activity/Device (Grooming Goal 1, OT) hair care;oral care;wash face, hands  -BS     [place] (Grooming Goal 1, OT) set-up required  -BS     Time Frame (Grooming Goal 1, OT) long term goal (LTG);10 days  -BS     Progress/Outcome (Grooming Goal 1, OT) goal ongoing  -BS     Row Name 02/02/23 1418          Strength Goal 1 (OT)    Strength Goal 1 (OT) BUE grossly 4-/5  -BS     Time Frame (Strength Goal 1, OT) long term goal (LTG);10 days  -BS     Progress/Outcome (Strength Goal 1, OT)  goal ongoing  -BS     Row Name 02/02/23 1418          Therapy Assessment/Plan (OT)    Planned Therapy Interventions (OT) activity tolerance training;adaptive equipment training;functional balance retraining;occupation/activity based interventions;strengthening exercise;ROM/therapeutic exercise  -BS           User Key  (r) = Recorded By, (t) = Taken By, (c) = Cosigned By    Initials Name Provider Type    BS Neeta Ordoñez OT Occupational Therapist               Clinical Impression     Row Name 02/02/23 1411          Pain Assessment    Pretreatment Pain Rating 8/10  -BS     Posttreatment Pain Rating 8/10  -BS     Pain Location lower  -BS     Pain Location - back  -BS     Pain Intervention(s) Ambulation/increased activity;Repositioned  -BS     Row Name 02/02/23 1411          Plan of Care Review    Plan of Care Reviewed With patient  -BS     Progress no change  -BS     Outcome Evaluation Pt is a 57 y.o. female admitted with Pyuria and generalized weakness. Recent discharge on 1/29 with L renal hematoma, stent and PMH of HD, HTN, CVA. Pt participated in bed mobility with Max Ax2 and was dependent for LBD tasks. Max encouragement for participation. Pt presents with decreased strength, activity tolerance, and balance impacting ADL independence. Rec SNF at d/c.  -     Row Name 02/02/23 1411          Therapy Assessment/Plan (OT)    Rehab Potential (OT) good, to achieve stated therapy goals  -     Criteria for Skilled Therapeutic Interventions Met (OT) yes;meets criteria;skilled treatment is necessary  -BS     Therapy Frequency (OT) 3 times/wk  -BS     Row Name 02/02/23 1411          Therapy Plan Review/Discharge Plan (OT)    Anticipated Discharge Disposition (OT) skilled nursing facility  -     Row Name 02/02/23 1411          Vital Signs    Pre SpO2 (%) 100  -BS     O2 Delivery Pre Treatment supplemental O2  1LNC  -BS     Post SpO2 (%) 98  -BS     Pre Patient Position Supine  -BS     Intra Patient Position Sitting  -BS      Post Patient Position Supine  -BS     Row Name 02/02/23 1411          Positioning and Restraints    Pre-Treatment Position in bed  -BS     Post Treatment Position bed  -BS     In Bed supine;notified nsg;call light within reach;encouraged to call for assist;exit alarm on  -BS           User Key  (r) = Recorded By, (t) = Taken By, (c) = Cosigned By    Initials Name Provider Type    BS Neeta Ordoñez, VIOLET Occupational Therapist               Outcome Measures     Row Name 02/02/23 1420          How much help from another is currently needed...    Putting on and taking off regular lower body clothing? 1  -BS     Bathing (including washing, rinsing, and drying) 1  -BS     Toileting (which includes using toilet bed pan or urinal) 1  -BS     Putting on and taking off regular upper body clothing 2  -BS     Taking care of personal grooming (such as brushing teeth) 2  -BS     Eating meals 2  -BS     AM-PAC 6 Clicks Score (OT) 9  -BS     Row Name 02/02/23 1409          How much help from another person do you currently need...    Turning from your back to your side while in flat bed without using bedrails? 2  -PC     Moving from lying on back to sitting on the side of a flat bed without bedrails? 2  -PC     Moving to and from a bed to a chair (including a wheelchair)? 1  -PC     Standing up from a chair using your arms (e.g., wheelchair, bedside chair)? 1  -PC     Climbing 3-5 steps with a railing? 1  -PC     To walk in hospital room? 1  -PC     AM-PAC 6 Clicks Score (PT) 8  -PC     Highest level of mobility 3 --> Sat at edge of bed  -PC     Row Name 02/02/23 1420 02/02/23 1409       Functional Assessment    Outcome Measure Options AM-PAC 6 Clicks Daily Activity (OT)  -BS AM-PAC 6 Clicks Basic Mobility (PT)  -PC          User Key  (r) = Recorded By, (t) = Taken By, (c) = Cosigned By    Initials Name Provider Type    PC Cristela Perez, PT Physical Therapist    Neeta Jacobson, VIOLET Occupational Therapist                 Occupational Therapy Education     Title: PT OT SLP Therapies (In Progress)     Topic: Occupational Therapy (In Progress)     Point: ADL training (Done)     Description:   Instruct learner(s) on proper safety adaptation and remediation techniques during self care or transfers.   Instruct in proper use of assistive devices.              Learning Progress Summary           Patient Acceptance, E, VU by BS at 2/2/2023 1420    Comment: Reason for eval and consult                   Point: Home exercise program (Not Started)     Description:   Instruct learner(s) on appropriate technique for monitoring, assisting and/or progressing therapeutic exercises/activities.              Learner Progress:  Not documented in this visit.          Point: Precautions (Done)     Description:   Instruct learner(s) on prescribed precautions during self-care and functional transfers.              Learning Progress Summary           Patient Acceptance, E, VU by BS at 2/2/2023 1420    Comment: Reason for eval and consult                   Point: Body mechanics (Done)     Description:   Instruct learner(s) on proper positioning and spine alignment during self-care, functional mobility activities and/or exercises.              Learning Progress Summary           Patient Acceptance, E, VU by BS at 2/2/2023 1420    Comment: Reason for eval and consult                               User Key     Initials Effective Dates Name Provider Type Discipline     11/14/22 -  Neeta Ordoñez, VIOLTE Occupational Therapist OT              OT Recommendation and Plan  Planned Therapy Interventions (OT): activity tolerance training, adaptive equipment training, functional balance retraining, occupation/activity based interventions, strengthening exercise, ROM/therapeutic exercise  Therapy Frequency (OT): 3 times/wk  Plan of Care Review  Plan of Care Reviewed With: patient  Progress: no change  Outcome Evaluation: Pt is a 57 y.o. female admitted with Pyuria and  generalized weakness. Recent discharge on 1/29 with L renal hematoma, stent and PMH of HD, HTN, CVA. Pt participated in bed mobility with Max Ax2 and was dependent for LBD tasks. Max encouragement for participation. Pt presents with decreased strength, activity tolerance, and balance impacting ADL independence. Rec SNF at d/c.     Time Calculation:    Time Calculation- OT     Row Name 02/02/23 1422             Time Calculation- OT    OT Start Time 1315  -BS      OT Stop Time 1332  -BS      OT Time Calculation (min) 17 min  -BS      Total Timed Code Minutes- OT 8 minute(s)  -BS      OT Received On 02/02/23  -BS      OT - Next Appointment 02/03/23  -BS      OT Goal Re-Cert Due Date 02/16/23  -BS         Timed Charges    00467 - OT Therapeutic Activity Minutes 8  -BS         Untimed Charges    OT Eval/Re-eval Minutes 9  -BS         Total Minutes    Timed Charges Total Minutes 8  -BS      Untimed Charges Total Minutes 9  -BS       Total Minutes 17  -BS            User Key  (r) = Recorded By, (t) = Taken By, (c) = Cosigned By    Initials Name Provider Type    BS Neeta Ordoñez OT Occupational Therapist              Therapy Charges for Today     Code Description Service Date Service Provider Modifiers Qty    43664816892 HC OT THERAPEUTIC ACT EA 15 MIN 2/2/2023 Neeta Ordoñez OT GO 1    70312641831 HC OT EVAL MOD COMPLEXITY 3 2/2/2023 Neeta Ordoñez OT GO 1               Neeta Ordoñez OT  2/2/2023

## 2023-02-02 NOTE — CASE MANAGEMENT/SOCIAL WORK
Continued Stay Note  [facility]     Patient Name: Zaina Martinez  MRN: [id number]  Today's Date: 2/2/2023    Admit Date: 2/1/2023    Plan: Plan to SNF, Plan for w/c van discharge.   Discharge Plan     Row Name 02/02/23 1552       Plan    Plan Comments Spoke with [facility] and [facility] has declined. Helena DIAZ RN    Row Name 02/02/23 1416       Plan    Plan Plan to SNF, Plan for w/c van discharge.    Patient/Family in Agreement with Plan yes    Plan Comments Spoke with patient at bedside, requested that CCP call Spouse/ Marv. Spoke with Marv [phone] via outbound call. Spouse stated that they would like referrals for SNF with HD or [facility] as that is close to him and her current HD facility, and he would be able to transport her to and from HD. Referrals to [facility]/ Guillermina [phone], [facility]/ Gina [phone], and [facility]/ Rosalinda [phone]. Spouse stated that he is unsure if he will be able to transport at discharge due to patient’s weakness. Ania LOERA RN, CCP               Discharge Codes    No documentation.               Expected Discharge Date and Time     Expected Discharge Date Expected Discharge Time    Feb 3, 2023             Helena Cavanaugh RN

## 2023-02-02 NOTE — CONSULTS
Nutrition Services    Patient Name:  Zaina Martinez  YOB: 1965  MRN: [id number]  Admit Date:  2/1/2023    Assessment Date:  02/02/23    Comment: MSA/ Consult per RN Admit Screen for MST 3:  Familiar with pt from recent admit. Pt stated unsure if she has had recent wt loss or UBW, but per wt hx in EMR, pt with 26 lb (20%) wt loss x 1 month. Noted some of that wt loss due to fluid shifts with HD. Pt also noted to have moderate-severe fat/muslce wasting noted on NFPE. Pt stated decreased appetite and tolerating about 50% po intake of Renal diet. Pt was previously drinking [employer] Renal ONS TID on previous admit. Pt stated she is tired of the vanilla flavor and would like the cafe/mocha flavor instead, will order. Encouraged increased po intake of meals/ONS.    Severe Malnutrition (Based on ASPEN/AND criteria, pt meets nutrition diagnosis of Severe Malnutrition of Chronic Disease due to inadequate po intake, 26 lb (20%) wt loss x 1 month and moderate to severe fat/muscle wasting noted on NFPE.)    Recommend:  1. Nov[employer] Renal TID (cafe mocha), will order.    Will follow per protocol.    CLINICAL NUTRITION ASSESSMENT      Reason for Assessment Malnutrition Severity Assessment (MSA), MST score 2+, Nurse Admission Screen     Diagnosis/Problem   Weakness, pyuria   Medical/Surgical History Past Medical History:   Diagnosis Date   • Acute CVA (cerebrovascular accident) (HCC) 05/01/2022   • Acute on chronic diastolic CHF (congestive heart failure) ([facility])    • Anemia    • CAD (coronary artery disease) 12/20/2021   • Diabetes ([facility])    • Disease of thyroid gland    • GERD (gastroesophageal reflux disease)    • History of intracranial hemorrhage 5/1/2022   • Hyperlipidemia 11/30/2018   • Hypertension    • Rheumatoid arthritis (HCC)    • Stage 5 chronic kidney disease (HCC)        Past Surgical History:   Procedure Laterality Date   • CHOLECYSTECTOMY WITH INTRAOPERATIVE CHOLANGIOGRAM N/A 1/10/2022    Procedure:  Laparoscopic cholecystectomy with intraoperative cholangiogram;  Surgeon: Ramana Raygoza MD;  Location: [facility] OR;  Service: General;  Laterality: N/A;   • CYSTOSCOPY W/ URETERAL STENT PLACEMENT Left 9/29/2022    Procedure: CYSTOSCOPY URETERAL STENT INSERTION WITH RETROGRADE PYELOGRAM;  Surgeon: Bryant Phan Jr., MD;  Location: [facility] OR;  Service: Urology;  Laterality: Left;   • CYSTOSCOPY W/ URETERAL STENT PLACEMENT Left 1/10/2023    Procedure: CYSTOSCOPY LEFT STENT REMOVAL;  Surgeon: Bryant Phan Jr., MD;  Location: [facility] OR;  Service: Urology;  Laterality: Left;   • EMBOLIZATION MESENTERIC ARTERY N/A 1/1/2023    Procedure: LEFT KIDNEY EMBOLIZATION;  Surgeon: Bijan Ordoñez MD;  Location: [facility] OR 18/19;  Service: Interventional Radiology;  Laterality: N/A;   • EYE SURGERY     • FEMUR IM NAILING/RODDING Right 11/10/2022    Procedure: FEMUR INTRAMEDULLARY NAILING/RODDING;  Surgeon: Glen Pierce MD;  Location: [facility] OR;  Service: Orthopedics;  Laterality: Right;   • HIP INTERTROCHANTERIC NAILING Right 11/10/2022    Procedure: HIP INTERTROCHANTERIC NAILING;  Surgeon: Glen Pierce MD;  Location: [facility];  Service: Orthopedics;  Laterality: Right;   • HYSTERECTOMY     • INSERT CENTRAL LINE AT BEDSIDE  12/31/2022        • INSERTION HEMODIALYSIS CATHETER N/A 12/6/2021    Procedure: HEMODIALYSIS CATHETER INSERTION;  Surgeon: Keli Salazar MD;  Location: [facility] OR 18/19;  Service: Vascular;  Laterality: N/A;   • INSERTION HEMODIALYSIS CATHETER N/A 5/3/2022    Procedure: TUNNELED CATHETER PLACEMENT;  Surgeon: Keli Salazar MD;  Location: [facility] OR;  Service: Vascular;  Laterality: N/A;   • MUSCLE BIOPSY Left 6/15/2022    Procedure: Left quadriceps muscle biopsy;  Surgeon: Marques Ma MD;  Location: [facility];  Service: Neurosurgery;  Laterality: Left;   • URETEROSCOPY LASER LITHOTRIPSY WITH STENT  "INSERTION Left 12/30/2022    Procedure: CYSTOSCOPY WITH LEFT  URETEROSCOPY  LEFT STENT EXCHANGE;  Surgeon: Bryant Phan Jr., MD;  Location: [facility];  Service: Urology;  Laterality: Left;        Encounter Information        Nutrition History:  Familiar with pt from recent admit. Pt stated unsure if she has had recent wt loss or UBW, but per wt hx in EMR, pt with 26 lb (20%) wt loss x 1 month. Noted some of that wt loss due to fluid shifts with HD. Pt also noted to have moderate-severe fat/muslce wasting noted on NFPE. Pt stated decreased appetite and tolerating about 50% po intake of Renal diet. Pt was previously drinking [employer] Renal ONS TID on previous admit. Pt stated she is tired of the vanilla flavor and would like the cafe/mocha flavor instead, will order. Encouraged increased po intake of meals/ONS.   Food Preferences:    Supplements: Novasource Renal TID   Factors Affecting Intake: decreased appetite     Anthropometrics        Current Height  Current Weight  BMI kg/m2 Height: 157.5 cm (62\")  Weight: 59.1 kg (130 lb 3.2 oz) (02/01/23 1843)  Body mass index is 23.81 kg/m².   Adjusted BMI (if applicable)        Admission Weight Weight: 59.1 kg (130 lb 3.2 oz)       Ideal Body Weight (IBW) 110 lb   Adjusted IBW (if applicable)        Usual Body Weight (UBW) Varies, see below   Weight Change/Trend Loss, Amount/Timeframe: 26 lb (20%) wt loss x 1 month       Weight History Wt Readings from Last 30 Encounters:   02/01/23 1843 59.1 kg (130 lb 3.2 oz)   02/01/23 0951 62.4 kg (137 lb 8 oz)   01/29/23 0600 54.8 kg (120 lb 13 oz)   01/28/23 0147 56.8 kg (125 lb 3.5 oz)   01/27/23 1100 48.7 kg (107 lb 5.8 oz)   01/27/23 0634 56.3 kg (124 lb 1.9 oz)   01/26/23 1200 50.2 kg (110 lb 10.7 oz)   01/26/23 0609 56.9 kg (125 lb 8 oz)   01/25/23 0504 52.1 kg (114 lb 13.8 oz)   01/24/23 0534 51.4 kg (113 lb 6.4 oz)   01/23/23 1701 51.7 kg (114 lb)   01/23/23 0629 51.8 kg (114 lb 3.2 oz)   01/22/23 0500 57.4 kg (126 " lb 8.7 oz)   01/21/23 0500 56.8 kg (125 lb 3.5 oz)   01/20/23 0600 56 kg (123 lb 7.3 oz)   01/19/23 0530 61.2 kg (135 lb)   01/18/23 0627 63.4 kg (139 lb 12.4 oz)   01/17/23 0500 64 kg (141 lb 1.5 oz)   01/16/23 0300 63.9 kg (140 lb 14 oz)   01/12/23 1500 68.2 kg (150 lb 5.7 oz)   01/12/23 0534 69 kg (152 lb 1.9 oz)   01/11/23 1244 68.1 kg (150 lb 2.1 oz)   01/10/23 0640 73.2 kg (161 lb 6 oz)   01/09/23 0507 74.1 kg (163 lb 5.8 oz)   01/08/23 0658 71.2 kg (156 lb 15.5 oz)   01/07/23 0500 70.8 kg (156 lb 1.4 oz)   01/06/23 0610 73.3 kg (161 lb 9.6 oz)   01/05/23 0508 68.6 kg (151 lb 3.8 oz)   01/02/23 0900 65.8 kg (145 lb 1 oz)   01/01/23 0600 64.6 kg (142 lb 6.7 oz)   12/26/22 1947 59.3 kg (130 lb 12.8 oz)   12/09/22 0517 57.7 kg (127 lb 3.3 oz)   12/08/22 0549 27 kg (59 lb 8 oz)   12/04/22 1934 57.4 kg (126 lb 8.7 oz)   11/23/22 1156 57.4 kg (126 lb 8.7 oz)   11/23/22 0443 60.4 kg (133 lb 2.5 oz)   11/22/22 0606 55.6 kg (122 lb 9.2 oz)   11/21/22 1200 58.3 kg (128 lb 8.5 oz)   11/20/22 0337 61.2 kg (134 lb 14.7 oz)   11/19/22 0456 60.1 kg (132 lb 7.9 oz)   11/18/22 0458 61.3 kg (135 lb 2.3 oz)   11/17/22 0418 58.4 kg (128 lb 12 oz)   11/16/22 0400 59.7 kg (131 lb 11.2 oz)   11/16/22 0044 59.8 kg (131 lb 12.8 oz)   11/15/22 0228 55.9 kg (123 lb 3.8 oz)   11/14/22 0600 63.3 kg (139 lb 8.8 oz)   11/13/22 0722 60.5 kg (133 lb 6.1 oz)   11/13/22 0554 60.5 kg (133 lb 6.4 oz)   11/11/22 1200 59 kg (130 lb 1.1 oz)   11/11/22 0700 58.5 kg (128 lb 14.4 oz)   11/09/22 0424 57.7 kg (127 lb 3.3 oz)   11/01/22 0833 54.1 kg (119 lb 4.3 oz)   11/01/22 0643 55.8 kg (123 lb 0.3 oz)   10/31/22 0504 52.5 kg (115 lb 11.9 oz)   10/30/22 0500 56.3 kg (124 lb 1.9 oz)   10/29/22 0625 54.9 kg (121 lb 0.5 oz)   10/28/22 0500 56.8 kg (125 lb 3.5 oz)   10/27/22 0601 57.2 kg (126 lb)   10/17/22 0500 57.6 kg (126 lb 15.8 oz)   10/12/22 2204 56.6 kg (124 lb 12.5 oz)   10/06/22 0511 56.4 kg (124 lb 4.8 oz)   10/05/22 0300 57.1 kg (125 lb 14.1 oz)    10/04/22 1516 54.2 kg (119 lb 8 oz)   10/04/22 0530 58 kg (127 lb 12.8 oz)   10/03/22 0500 57.7 kg (127 lb 3.3 oz)   10/02/22 0556 56.2 kg (123 lb 14.4 oz)   10/01/22 0500 55.2 kg (121 lb 12.8 oz)   09/30/22 1114 61 kg (134 lb 7.7 oz)   09/30/22 0551 64.5 kg (142 lb 3.2 oz)   09/29/22 0513 63 kg (138 lb 14.2 oz)   09/28/22 0615 59.8 kg (131 lb 13.4 oz)   09/27/22 1455 52.8 kg (116 lb 6.5 oz)   09/27/22 0600 56.3 kg (124 lb 1.9 oz)   09/27/22 0503 56.3 kg (124 lb 1.9 oz)   09/26/22 2305 59 kg (130 lb)   09/13/22 0500 60 kg (132 lb 4.4 oz)   09/11/22 0548 59.8 kg (131 lb 14.4 oz)   09/10/22 0530 56.7 kg (125 lb 1.6 oz)   09/09/22 0500 55.4 kg (122 lb 1.6 oz)   09/08/22 0511 59.5 kg (131 lb 3.2 oz)   09/07/22 1230 54.5 kg (120 lb 2.4 oz)   09/07/22 0640 56.2 kg (124 lb)   09/06/22 0539 55.5 kg (122 lb 6.4 oz)   09/05/22 0505 58.4 kg (128 lb 12.8 oz)   09/04/22 0500 62.6 kg (138 lb 1.6 oz)   09/03/22 1708 64.9 kg (143 lb 1.3 oz)   09/03/22 1355 64.9 kg (143 lb 1.3 oz)   08/27/22 0527 64.9 kg (143 lb 1.6 oz)   08/26/22 0942 64.9 kg (143 lb)   08/26/22 0500 66.6 kg (146 lb 12.8 oz)   08/25/22 0810 67 kg (147 lb 11.3 oz)   08/25/22 0500 67.1 kg (147 lb 14.4 oz)   08/24/22 0518 67.7 kg (149 lb 4 oz)   08/23/22 0526 66.8 kg (147 lb 4.3 oz)   08/22/22 0521 67.5 kg (148 lb 14.4 oz)   08/21/22 0846 65.3 kg (144 lb)   08/21/22 0350 65.4 kg (144 lb 3.2 oz)   08/20/22 0516 65 kg (143 lb 3.2 oz)   08/04/22 0511 65.1 kg (143 lb 8.3 oz)   08/03/22 0504 66.8 kg (147 lb 4.3 oz)   08/02/22 0602 65.4 kg (144 lb 2.9 oz)   08/01/22 1557 62.5 kg (137 lb 12.6 oz)   08/01/22 0700 68.3 kg (150 lb 9.2 oz)   07/31/22 0500 64.3 kg (141 lb 12.1 oz)   07/30/22 0638 65.2 kg (143 lb 11.8 oz)   07/29/22 1532 66.7 kg (147 lb)   07/29/22 0548 66.7 kg (147 lb 0.8 oz)   07/28/22 0610 65 kg (143 lb 4.8 oz)   07/27/22 2055 65.6 kg (144 lb 9.6 oz)   07/22/22 1542 66.3 kg (146 lb 2.6 oz)   07/22/22 0632 69.3 kg (152 lb 12.5 oz)   07/21/22 0539 69.7 kg (153  lb 10.6 oz)   07/18/22 0842 71.1 kg (156 lb 11.2 oz)   07/17/22 0549 68.6 kg (151 lb 3.8 oz)   07/16/22 0633 67 kg (147 lb 12.8 oz)   07/14/22 1738 69.9 kg (154 lb)   07/13/22 1041 61.7 kg (136 lb 1.6 oz)   07/04/22 1756 75.5 kg (166 lb 7.2 oz)   07/01/22 1700 73.9 kg (163 lb)   06/15/22 1100 62.5 kg (137 lb 12.6 oz)   06/12/22 0743 66.2 kg (145 lb 15.1 oz)   05/26/22 1700 64.9 kg (143 lb 1.3 oz)   05/24/22 1726 64.5 kg (142 lb 3.2 oz)   05/16/22 0817 54.2 kg (119 lb 7.8 oz)   05/13/22 1911 54.2 kg (119 lb 7.8 oz)   05/13/22 1300 51 kg (112 lb 7 oz)   05/13/22 0700 54.7 kg (120 lb 8 oz)   05/12/22 0514 55.9 kg (123 lb 3.8 oz)   05/11/22 0535 54.1 kg (119 lb 4.3 oz)   05/10/22 0512 52.9 kg (116 lb 9.6 oz)   05/09/22 0535 56.3 kg (124 lb 3.2 oz)   05/08/22 0614 54.7 kg (120 lb 9.5 oz)   05/07/22 0552 48.2 kg (106 lb 4.2 oz)   05/06/22 0500 46 kg (101 lb 6.4 oz)   05/05/22 1154 46.2 kg (101 lb 13.6 oz)   05/05/22 0647 54.5 kg (120 lb 2.4 oz)   05/04/22 0300 57.2 kg (126 lb 1.7 oz)   05/03/22 0520 59.9 kg (132 lb 0.9 oz)   05/01/22 1135 57.8 kg (127 lb 6.8 oz)   05/01/22 0600 57.8 kg (127 lb 6.8 oz)   04/29/22 1900 50.3 kg (110 lb 14.3 oz)   04/29/22 0519 50.3 kg (110 lb 12.8 oz)   04/28/22 0558 55.9 kg (123 lb 3.8 oz)   04/28/22 0427 54.8 kg (120 lb 13 oz)   04/28/22 1511 55.8 kg (123 lb)   03/02/22 1615 62.4 kg (137 lb 9.1 oz)   03/02/22 0020 64.4 kg (141 lb 15.6 oz)   03/01/22 0434 62 kg (136 lb 11.2 oz)   02/28/22 0413 62.2 kg (137 lb 3.2 oz)   02/27/22 0644 64.1 kg (141 lb 5 oz)   02/26/22 0500 65.1 kg (143 lb 8.3 oz)   02/25/22 0500 65.4 kg (144 lb 2.9 oz)   02/24/22 0500 65.9 kg (145 lb 4.5 oz)   02/23/22 2300 64.9 kg (143 lb 1.3 oz)   01/24/22 0814 71.4 kg (157 lb 6.4 oz)   01/15/22 0500 71.2 kg (156 lb 15.5 oz)   01/14/22 0500 69.6 kg (153 lb 7 oz)   01/13/22 0500 69.7 kg (153 lb 10.6 oz)   01/12/22 0500 69.9 kg (154 lb 1.6 oz)   01/11/22 0500 86.3 kg (190 lb 4.1 oz)   01/10/22 0700 82.3 kg (181 lb 8 oz)    01/09/22 1808 77.6 kg (171 lb)   01/09/22 1006 79.4 kg (175 lb)   12/29/21 0508 71.8 kg (158 lb 3.2 oz)   12/28/21 1251 73.7 kg (162 lb 7.7 oz)   12/28/21 0513 73.7 kg (162 lb 7.7 oz)   12/27/21 0500 67.7 kg (149 lb 3.2 oz)   12/26/21 1053 70.7 kg (155 lb 13.8 oz)   12/26/21 0500 73.8 kg (162 lb 9.6 oz)   12/25/21 0457 72.3 kg (159 lb 4.8 oz)   12/24/21 0500 72.6 kg (160 lb 1.6 oz)   12/23/21 0500 75.9 kg (167 lb 6.4 oz)   12/22/21 0500 73.9 kg (163 lb)   12/20/21 1100 70.3 kg (155 lb)   12/19/21 1338 69.9 kg (154 lb)   12/17/21 0449 69.9 kg (154 lb)   12/16/21 1300 70.2 kg (154 lb 12.2 oz)   12/16/21 0306 72.3 kg (159 lb 4.8 oz)   12/15/21 1531 70.2 kg (154 lb 12.8 oz)   12/01/21 2300 61.2 kg (135 lb)   11/30/21 1252 59 kg (130 lb)   11/30/21 0200 59 kg (130 lb)           --  Tests/Procedures        Tests/Procedures X-Ray     Labs       Pertinent Labs    Results from last 7 days   Lab Units 02/01/23  1109 01/28/23  0609 01/27/23  0518   SODIUM mmol/L 133* 131* 131*   POTASSIUM mmol/L 3.7 3.7 3.7   CHLORIDE mmol/L 96* 100 99   CO2 mmol/L 26.0 21.4* 21.6*   BUN mg/dL 25* 19 24*   CREATININE mg/dL 3.13* 1.99* 2.20*   CALCIUM mg/dL 8.2* 8.1* 8.1*   BILIRUBIN mg/dL 0.6  --  0.5   ALK PHOS U/L 226*  --  359*   ALT (SGPT) U/L <5  --  <5   AST (SGOT) U/L 12  --  12   GLUCOSE mg/dL 190* 201* 211*     Results from last 7 days   Lab Units 02/01/23  1109   PHOSPHORUS mg/dL 2.9   HEMOGLOBIN g/dL 11.2*   HEMATOCRIT % 36.2   WBC 10*3/mm3 14.01*   ALBUMIN g/dL 2.9*     Results from last 7 days   Lab Units 02/01/23  1109 01/28/23  0609 01/27/23  0518   PLATELETS 10*3/mm3 330 412 459*     COVID19   Date Value Ref Range Status   02/01/2023 Not Detected Not Detected - Ref. Range Final     Lab Results   Component Value Date    HGBA1C 6.20 (H) 02/02/2023          Medications           Scheduled Medications amLODIPine, 10 mg, Oral, Q24H  aspirin, 81 mg, Oral, Daily  carvedilol, 3.125 mg, Oral, BID With Meals  folic acid, 1 mg, Oral,  Daily  heparin (porcine), 5,000 Units, Subcutaneous, Q8H  insulin glargine, 3 Units, Subcutaneous, Nightly  insulin lispro, 0-9 Units, Subcutaneous, TID With Meals  lactulose, 10 g, Oral, BID  levETIRAcetam, 250 mg, Oral, BID  levothyroxine, 300 mcg, Oral, Q AM  lidocaine, 1 patch, Transdermal, Q24H  pantoprazole, 40 mg, Oral, Daily  sertraline, 100 mg, Oral, Daily       Infusions     PRN Medications •  dextrose  •  dextrose  •  glucagon (human recombinant)  •  sodium chloride     Physical Findings          Physical Appearance alert, generalized wasting, loss of muscle mass, loss of subcutaneous fat, oriented   Oral/Mouth Cavity WNL   Edema  no edema   Gastrointestinal hypoactive bowel sounds, non-distended , last bowel movement:2/2   Skin  skin intact   Tubes/Drains none   NFPE Consented to exam, See Malnutrition Severity Assessment, Date Completed: 2/2   --  Malnutrition Severity Assessment      Patient meets criteria for : Severe Malnutrition (Based on ASPEN/AND criteria, pt meets nutrition diagnosis of Severe Malnutrition of Chronic Disease due to inadequate po intake, 26 lb (20%) wt loss x 1 month and moderate to severe fat/muscle wasting noted on NFPE.)  Malnutrition Type (last 8 hours)     Malnutrition Severity Assessment     Row Name 02/02/23 1149       Malnutrition Severity Assessment    Malnutrition Type Chronic Disease - Related Malnutrition    Row Name 02/02/23 1149       Insufficient Energy Intake     Insufficient Energy Intake Findings Severe    Insufficient Energy Intake  <75% of est. energy requirement for > or equal to 1 month    Row Name 02/02/23 1149       Unintentional Weight Loss     Unintentional Weight Loss Findings Severe    Unintentional Weight Loss  Weight loss greater than 5% in one month  26 lb (20%) wt loss x 1 month    Row Name 02/02/23 1149       Muscle Loss    Loss of Muscle Mass Findings Severe    [place] Region Moderate - slight depression    Clavicle Bone Region Moderate - some  protrusion in females, visible in males    Acromion Bone Region Moderate - acromion may slightly protrude    Scapular Bone Region Moderate - mild depression, bones may show slightly    Dorsal Hand Region Severe - prominent depression    Patellar Region Severe - prominent bone, square looking, very little muscle definition    Anterior Thigh Region Severe - line/depression along thigh, obviously thin    Posterior Calf Region Severe - thin with very little definition/firmness    Row Name 02/02/23 1149       Fat Loss    Subcutaneous Fat Loss Findings Moderate    Orbital Region  Moderate -  somewhat hollowness, slightly dark circles    Upper Arm Region Moderate - some fat tissue, not ample    Row Name 02/02/23 1149       Criteria Met (Must meet criteria for severity in at least 2 of these categories: M Wasting, Fat Loss, Fluid, Secondary Signs, Wt. Status, Intake)    Patient meets criteria for  Severe Malnutrition  Based on ASPEN/AND criteria, pt meets nutrition diagnosis of Severe Malnutrition of Chronic Disease due to inadequate po intake, 26 lb (20%) wt loss x 1 month and moderate to severe fat/muscle wasting noted on NFPE.                   Current Nutrition Orders & Evaluation of Intake       Oral Nutrition     Food Allergies NKFA   Current PO Diet Diet: Renal Diets; Low Sodium (2-3g), Low Potassium; Texture: Regular Texture (IDDSI 7); Fluid Consistency: Thin (IDDSI 0)   Supplement n/a   PO Evaluation     % PO Intake 50%    # of Days Evaluated 2   --  PES STATEMENT / NUTRITION DIAGNOSIS      Nutrition Dx Problem  Problem: Malnutrition  Etiology: Factors Affecting Nutrition decreased appetite  Signs/Symptoms: Report of Minimal PO Intake, NFPE Results and Unintended Weight Change  26 lb (20%) wt loss x 1 month    Comment:    --  NUTRITION INTERVENTION / PLAN OF CARE      Intervention Goal(s) Maintain nutrition status, Reduce/improve symptoms, Meet estimated needs, Disease management/therapy, Increase intake and  Maintain weight         RD Intervention/Action Advise alternative selection, Interview for preferences, Encourage intake, Follow Tx Progress, Care plan reviewed and Recommend/ordered:          Prescription/Orders:       PO Diet Renal Diet      Supplements Novasource Renal TID (cafe mocha)      Snacks       Enteral Nutrition       Parenteral Nutrition    New Prescription Ordered? Yes   --      Monitor/Evaluation Per protocol   Discharge Plan/Needs Pending clinical course   Education Will instruct as appropriate   --    RD to follow per protocol.      Electronically signed by:  Yashira Garcia RD  02/02/23 14:20 EST

## 2023-02-02 NOTE — H&P
HISTORY AND PHYSICAL   [facility]        Date of Admission: 2023  Patient Identification:  Name: Zaina Martinez  Age: 57 y.o.  Sex: female  :  1965  MRN: [id number]                     Primary Care Physician: Provider, No Known    Chief Complaint:  57 year old female who presented to the emergency room with weakness; she was recently admitted and just discharged three days ago; she was not able to get out of bed much after she got there so her  brought her back; she denies fever or chills; she has had some nausea, vomiting and diarrhea    History of Present Illness:   As above    Past Medical History:  Past Medical History:   Diagnosis Date   • Acute CVA (cerebrovascular accident) ([facility]) 2022   • Acute on chronic diastolic CHF (congestive heart failure) ([facility])    • Anemia    • CAD (coronary artery disease) 2021   • Diabetes ([facility])    • Disease of thyroid gland    • GERD (gastroesophageal reflux disease)    • History of intracranial hemorrhage 2022   • Hyperlipidemia 2018   • Hypertension    • Rheumatoid arthritis (HCC)    • Stage 5 chronic kidney disease (HCC)      Past Surgical History:  Past Surgical History:   Procedure Laterality Date   • CHOLECYSTECTOMY WITH INTRAOPERATIVE CHOLANGIOGRAM N/A 1/10/2022    Procedure: Laparoscopic cholecystectomy with intraoperative cholangiogram;  Surgeon: Ramana Raygoza MD;  Location: [facility];  Service: General;  Laterality: N/A;   • CYSTOSCOPY W/ URETERAL STENT PLACEMENT Left 2022    Procedure: CYSTOSCOPY URETERAL STENT INSERTION WITH RETROGRADE PYELOGRAM;  Surgeon: Bryant Phan Jr., MD;  Location: [facility] OR;  Service: Urology;  Laterality: Left;   • CYSTOSCOPY W/ URETERAL STENT PLACEMENT Left 1/10/2023    Procedure: CYSTOSCOPY LEFT STENT REMOVAL;  Surgeon: Bryant Phan Jr., MD;  Location: [facility] OR;  Service: Urology;  Laterality: Left;   • EMBOLIZATION MESENTERIC ARTERY N/A 2023     Procedure: LEFT KIDNEY EMBOLIZATION;  Surgeon: Bijan Ordoñez MD;  Location: [facility] OR 18/19;  Service: Interventional Radiology;  Laterality: N/A;   • EYE SURGERY     • FEMUR IM NAILING/RODDING Right 11/10/2022    Procedure: FEMUR INTRAMEDULLARY NAILING/RODDING;  Surgeon: Glen Pierce MD;  Location: [facility];  Service: Orthopedics;  Laterality: Right;   • HIP INTERTROCHANTERIC NAILING Right 11/10/2022    Procedure: HIP INTERTROCHANTERIC NAILING;  Surgeon: Glen Pierce MD;  Location: [facility];  Service: Orthopedics;  Laterality: Right;   • HYSTERECTOMY     • INSERT CENTRAL LINE AT BEDSIDE  12/31/2022        • INSERTION HEMODIALYSIS CATHETER N/A 12/6/2021    Procedure: HEMODIALYSIS CATHETER INSERTION;  Surgeon: Keli Salazar MD;  Location: [facility] 18/19;  Service: Vascular;  Laterality: N/A;   • INSERTION HEMODIALYSIS CATHETER N/A 5/3/2022    Procedure: TUNNELED CATHETER PLACEMENT;  Surgeon: Keli Salazar MD;  Location: [facility];  Service: Vascular;  Laterality: N/A;   • MUSCLE BIOPSY Left 6/15/2022    Procedure: Left quadriceps muscle biopsy;  Surgeon: Marques Ma MD;  Location: [facility];  Service: Neurosurgery;  Laterality: Left;   • URETEROSCOPY LASER LITHOTRIPSY WITH STENT INSERTION Left 12/30/2022    Procedure: CYSTOSCOPY WITH LEFT  URETEROSCOPY  LEFT STENT EXCHANGE;  Surgeon: Bryant Phan Jr., MD;  Location: [facility];  Service: Urology;  Laterality: Left;      Home Meds:  Medications Prior to Admission   Medication Sig Dispense Refill Last Dose   • amLODIPine (NORVASC) 10 MG tablet Take 1 tablet by mouth Daily. 30 tablet 0    • aspirin 81 MG EC tablet Take 1 tablet by mouth Daily. 30 tablet 0    • carvedilol (COREG) 3.125 MG tablet Take 1 tablet by mouth 2 (Two) Times a Day With Meals. 60 tablet 0    • folic acid (FOLVITE) 1 MG tablet Take 1 mg by mouth Daily.      • insulin glargine (LANTUS, SEMGLEE) 100  UNIT/ML injection Inject 3 Units under the skin into the appropriate area as directed Every Night. 10 mL 1    • insulin lispro (ADMELOG) 100 UNIT/ML injection Inject 0-7 Units under the skin into the appropriate area as directed 3 (Three) Times a Day Before Meals.      • lactulose (CHRONULAC) 10 GM/15ML solution Take 15 mL by mouth 2 (Two) Times a Day.      • levETIRAcetam (Keppra) 250 MG tablet Take 1 tablet by mouth 2 (Two) Times a Day. 60 tablet 0    • levothyroxine (Synthroid) 150 MCG tablet Take 2 tablets by mouth Daily. 60 tablet 0    • melatonin 3 MG tablet Take 1 tablet by mouth Every Night. (Patient taking differently: Take 3 mg by mouth At Night As Needed.) 30 tablet 0    • pantoprazole (PROTONIX) 40 MG EC tablet Take 1 tablet by mouth Daily. 90 tablet 0    • sennosides-docusate (PERICOLACE) 8.6-50 MG per tablet Take 2 tablets by mouth Every Night. (Patient taking differently: Take 2 tablets by mouth At Night As Needed.)      • sertraline (ZOLOFT) 50 MG tablet Take 2 tablets by mouth Daily. 90 tablet 1        Allergies:  Allergies   Allergen Reactions   • Contrast Dye (Echo Or Unknown Ct/Mr) Confusion   • Ibuprofen Other (See Comments)     Kidney disease   • Prochlorperazine Other (See Comments)     Dystonic reaction          Immunizations:  Immunization History   Administered Date(s) Administered   • Anthrax 02/28/2018   • Flu Vaccine Split Quad 11/15/2019   • Fluzone Quad >6mos (Multi-dose) 11/30/2018   • Hepatitis B Vaccine Adult IM 01/19/2022, 02/18/2022, 03/18/2022   • Influenza, Unspecified 11/30/2018   • PPD Test 01/16/2015     Social History:   Social History     Social History Narrative   • Not on file     Social History     Socioeconomic History   • Marital status:      Spouse name: Marv   Tobacco Use   • Smoking status: Never   • Smokeless tobacco: Never   Vaping Use   • Vaping Use: Never used   Substance and Sexual Activity   • Alcohol use: Never   • Drug use: Never   • Sexual activity:  "Defer       Family History:  Family History   Problem Relation Age of Onset   • Malig Hyperthermia Neg Hx         Review of Systems  See history of present illness and past medical history.  Patient denies headache, dizziness, syncope, falls, trauma, change in vision, change in hearing, change in taste, changes in weight, changes in appetite, focal weakness, numbness, or paresthesia.  Patient denies chest pain, palpitations, dyspnea, orthopnea, PND, cough, sinus pressure, rhinorrhea, epistaxis, hemoptysis, nausea, vomiting,hematemesis, diarrhea, constipation or hematchezia.  Denies cold or heat intolerance, polydipsia, polyuria, polyphagia. Denies hematuria, pyuria, dysuria, hesitancy, frequency or urgency. Denies consumption of raw and under cooked meats foods or change in water source.  Denies fever, chills, sweats, night sweats.  Denies missing any routine medications. Remainder of ROS is negative.    Objective:  T Max 24 hrs: Temp (24hrs), Av.4 °F (36.9 °C), Min:98.2 °F (36.8 °C), Max:98.7 °F (37.1 °C)    Vitals Ranges:   Temp:  [98.2 °F (36.8 °C)-98.7 °F (37.1 °C)] 98.4 °F (36.9 °C)  Heart Rate:  [71-81] 71  Resp:  [18] 18  BP: (157-177)/(77-96) 166/81      Exam:  /81 (BP Location: Left arm, Patient Position: Lying)   Pulse 71   Temp 98.4 °F (36.9 °C) (Oral)   Resp 18   Ht 157.5 cm (62\")   Wt 59.1 kg (130 lb 3.2 oz)   SpO2 94%   BMI 23.81 kg/m²     General Appearance:    Alert, cooperative, no distress, appears stated age; chronically ill appearing   Head:    Normocephalic, without obvious abnormality, atraumatic   Eyes:    PERRL, conjunctivae/corneas clear, EOM's intact, both eyes   Ears:    Normal external ear canals, both ears   Nose:   Nares normal, septum midline, mucosa normal, no drainage    or sinus tenderness   Throat:   Lips, mucosa, and tongue normal   Neck:   Supple, symmetrical, trachea midline, no adenopathy;     thyroid:  no enlargement/tenderness/nodules; no carotid    bruit or " JVD   Back:     Symmetric, no curvature, ROM normal, no CVA tenderness   Lungs:     Decreased breath sounds bilaterally, respirations unlabored   Chest Wall:    No tenderness or deformity    Heart:    Regular rate and rhythm, S1 and S2 normal, no murmur, rub   or gallop   Abdomen:     Soft, nontender, bowel sounds active all four quadrants,     no masses, no hepatomegaly, no splenomegaly   Extremities:   Extremities normal, atraumatic, no cyanosis or edema   Pulses:   2+ and symmetric all extremities   Skin:   Skin color, texture, turgor normal, no rashes or lesions               .    Data Review:  Labs in chart were reviewed.  WBC   Date Value Ref Range Status   02/01/2023 14.01 (H) 3.40 - 10.80 10*3/mm3 Final     Hemoglobin   Date Value Ref Range Status   02/01/2023 11.2 (L) 12.0 - 15.9 g/dL Final     Hematocrit   Date Value Ref Range Status   02/01/2023 36.2 34.0 - 46.6 % Final     Platelets   Date Value Ref Range Status   02/01/2023 330 140 - 450 10*3/mm3 Final     Sodium   Date Value Ref Range Status   02/01/2023 133 (L) 136 - 145 mmol/L Final     Potassium   Date Value Ref Range Status   02/01/2023 3.7 3.5 - 5.2 mmol/L Final     Chloride   Date Value Ref Range Status   02/01/2023 96 (L) 98 - 107 mmol/L Final     CO2   Date Value Ref Range Status   02/01/2023 26.0 22.0 - 29.0 mmol/L Final     BUN   Date Value Ref Range Status   02/01/2023 25 (H) 6 - 20 mg/dL Final     Creatinine   Date Value Ref Range Status   02/01/2023 3.13 (H) 0.57 - 1.00 mg/dL Final     Glucose   Date Value Ref Range Status   02/01/2023 190 (H) 65 - 99 mg/dL Final     Calcium   Date Value Ref Range Status   02/01/2023 8.2 (L) 8.6 - 10.5 mg/dL Final     Phosphorus   Date Value Ref Range Status   02/01/2023 2.9 2.5 - 4.5 mg/dL Final     AST (SGOT)   Date Value Ref Range Status   02/01/2023 12 1 - 32 U/L Final     ALT (SGPT)   Date Value Ref Range Status   02/01/2023 <5 1 - 33 U/L Final     Alkaline Phosphatase   Date Value Ref Range Status    02/01/2023 226 (H) 39 - 117 U/L Final       Results from last 7 days   Lab Units 01/28/23  0025 01/25/23  2353   T3 FREE pg/mL  --  0.57*   FREE T4 ng/dL 0.87* 0.90*          Imaging Results (All)     Procedure Component Value Units Date/Time    XR Chest 1 View [[id number]] Collected: 02/01/23 1127     Updated: 02/01/23 1132    Narrative:      XR CHEST 1 VW-     HISTORY: Female who is 57 years-old,  cough     TECHNIQUE: Frontal view of the chest     COMPARISON: 01/22/2023     FINDINGS: Right-sided central venous catheter appears stable. NG tube  has been removed. Patient is rotated towards the right. The heart  appears mildly enlarged. Pulmonary vasculature unremarkable. Small left  basilar atelectasis/infiltrate/effusion, appears decreased from the  prior exam. No pneumothorax. No acute osseous process.       Impression:      Small left basilar atelectasis/infiltrate/effusion, appears  decreased from the prior exam.     This report was finalized on 2/1/2023 11:29 AM by Dr. Jared Kern M.D.               Assessment:  Active Hospital Problems    Diagnosis  POA   • **Pyuria [R82.81]  Yes      Resolved Hospital Problems   No resolved problems to display.   weakness  esrd on hd  Atrial flutter  Rheumatoid arthritis  Cad  Chronic diastolic chf  Diabetes  Anemia  Nausea, vomiting and diarrhea    Plan:  Continue antibiotics  Ask id to see her  Monitor on telemetry  Ask nephrology to see her  She missed dialysis today  Trend labs  D.w patient,  and ED provider    Starla Boston MD  2/1/2023  20:11 EST

## 2023-02-02 NOTE — PLAN OF CARE
Goal Outcome Evaluation:  Plan of Care Reviewed With: patient        Progress: no change  Outcome Evaluation: Pt is a 57 y.o. female admitted with Pyuria and generalized weakness. Recent discharge on 1/29 with L renal hematoma, stent and PMH of HD, HTN, CVA. Pt participated in bed mobility with Max Ax2 and was dependent for LBD tasks. Max encouragement for participation. Pt presents with decreased strength, activity tolerance, and balance impacting ADL independence. Rec SNF at d/c.

## 2023-02-02 NOTE — CASE MANAGEMENT/SOCIAL WORK
Discharge Planning Assessment  [facility]     Patient Name: Zaina Martinez  MRN: [id number]  Today's Date: 2/2/2023    Admit Date: 2/1/2023    Plan: Plan to SNF, Plan for w/c van discharge.   Discharge Needs Assessment    No documentation.                Discharge Plan     Row Name 02/02/23 1416       Plan    Plan Plan to SNF, Plan for w/c van discharge.    Patient/Family in Agreement with Plan yes    Plan Comments Spoke with patient at bedside, requested that CCP call Spouse/ Marv. Spoke with Marv [phone] via outbound call. Spouse stated that they would like referrals for SNF with  or [facility] as that is close to him and her current HD facility, and he would be able to transport her to and from . Referrals to [facility]/ Guillermina [phone], [facility]/ Gina [phone], and [facility]/ Rosalinda [phone]. Spouse stated that he is unsure if he will be able to transport at discharge due to patient’s weakness. Ania LOERA RN, [facility]              Continued Care and Services - Admitted Since 2/1/2023     Destination     Service Provider Request Status Selected Services Address Phone Fax Patient Preferred    [facility] Pending - Request Sent N/A [address] [facility] [address] [phone] --    [facility] Pending - Request Sent N/A [address] [facility] [address] [phone] --    [facility] Pending - Request Sent N/A [address] [place] [address] [phone] --    [facility] Pending - Request Sent N/A [address] [facility] [address] [phone] --    [facility] Declined  Bed not available N/A [address], [facility] [address] [phone] --            Selected Continued Care - Prior Encounters Includes continued care and service providers with selected  services from prior encounters from 11/3/2022 to 2/2/2023    Discharged on 1/29/2023 Admission date: 12/26/2022 - Discharge disposition: Home or Self Care    Home Medical Care     Service Provider Selected Services Address Phone Fax Patient Preferred    [facility] [address] [address] [phone] --                Discharged on 12/9/2022 Admission date: 12/4/2022 - Discharge disposition: Home-Health Care [facility]    Home Medical Care     Service Provider Selected Services Address Phone Fax Patient Preferred    [facility] [address] [address] [phone] --                Discharged on 11/24/2022 Admission date: 11/8/2022 - Discharge disposition: Skilled Nursing Facility (DC - External)    Destination     Service Provider Selected Services Address Phone Fax Patient Preferred    [facility] [address] [facility] [address] [phone] --                    Expected Discharge Date and Time     Expected Discharge Date Expected Discharge Time    Feb 3, 2023          Demographic Summary     Row Name 02/02/23 1408       General Information    Admission Type observation    Arrived From emergency department    Required Notices Provided Observation Status Notice    Referral Source admission list    Reason for Consult discharge planning    Preferred Language English       Contact Information    Permission Granted to Share Info With                Functional Status     Row Name 02/02/23 1408       Functional Status    Usual Activity Tolerance fair    Current Activity Tolerance poor       Functional Status, IADL    Medications completely dependent    Meal Preparation completely dependent    Housekeeping completely dependent    Laundry completely dependent    Shopping completely dependent        Employment/    Employment Status disabled               Psychosocial    No documentation.                Abuse/Neglect    No documentation.                Legal    No documentation.                Substance Abuse    No documentation.                Patient Forms    No documentation.                   Ania Kay RN

## 2023-02-02 NOTE — NURSING NOTE
HD WITHOUT INCIDENT OR C/O. TOLERATED WELL. REMOVED 2 L AS DESEAN. NO MEDS ADMINISTERED. DRSG CURRENT, CDI. STABLE, NO C/O UPON COMPLETION OF TREATMENT.

## 2023-02-02 NOTE — OUTREACH NOTE
Medical Week 1 Survey    Flowsheet Row Responses   [facility] patient discharged from? [place]   Does the patient have one of the following disease processes/diagnoses(primary or secondary)? Other   Week 1 attempt successful? No   Unsuccessful attempts Attempt 1   Revoke Readmitted

## 2023-02-02 NOTE — THERAPY TREATMENT NOTE
Patient Name: Zaina Martinez  : 1965    MRN: [id number]                              Today's Date: 2023       Admit Date: 2023    Visit Dx:     ICD-10-CM ICD-9-CM   1. Pyuria  R82.81 791.9   2. Weakness  R53.1 780.79     Patient Active Problem List   Diagnosis   • Renal insufficiency   • Hypertensive disorder   • Hypothyroidism   • Type 2 diabetes mellitus with kidney complication, with long-term current use of insulin ([facility])   • Rheumatoid arthritis ([facility])   • Angioedema   • Esophageal dysmotility   • Anemia   • Medically noncompliant   • Myocardial infarction due to demand ischemia ([facility])   • Enteritis   • PRES (posterior reversible encephalopathy syndrome)   • Urine retention   • Klebsiella infection   • Superficial thrombophlebitis   • Generalized weakness   • ESRD (end stage renal disease) ([facility])   • CAD (coronary artery disease)   • Abnormal urinalysis   • Chronic diastolic CHF (congestive heart failure) ([facility])   • Pyelonephritis   • Calculus of gallbladder with acute on chronic cholecystitis without obstruction   • Pleural effusion on right   • Anemia due to chronic kidney disease, on chronic dialysis ([facility])   • Abnormal findings on diagnostic imaging of other specified body structures   • Acute upper respiratory infection   • Agitation   • Alkaline phosphatase raised   • Casts present in urine   • Cellulitis of toe   • Hip pain   • Community acquired pneumonia   • Depressive disorder   • Diarrhea of presumed infectious origin   • Difficult or painful urination   • Disease due to severe acute respiratory syndrome coronavirus 2 (SARS-CoV-2)   • Dyspnea   • Encounter for follow-up examination after completed treatment for conditions other than malignant neoplasm   • H/O: hypothyroidism   • Hyperlipidemia   • Hypomagnesemia   • Intractable vomiting with nausea   • Luetscher's syndrome   • Need for influenza vaccination   • Restless legs   • Noncompliance with treatment   • Shoulder pain   • Acute UTI  (urinary tract infection)   • Metabolic encephalopathy   • Abnormal findings on diagnostic imaging of abdomen   • Status post cholecystectomy   • Hyponatremia   • Acute metabolic encephalopathy   • Encephalopathy, toxic   • Acute CVA (cerebrovascular accident) ([facility])   • History of intracranial hemorrhage   • Stroke ([facility]CC)   • Abnormal urinalysis   • Diabetic muscle infarction ([facility]CC)   • Other insomnia   • Altered mental status   • History of stroke- R MCA s/p TPA with subsequent ICH with debility   • Heart murmur   • Bradycardia   • Left lower lobe pneumonia   • Metabolic encephalopathy   • Pericardial effusion   • Pleural effusion   • Acute pulmonary edema ([facility]CC)   • Atrial flutter (HCC)   • Chronic systolic CHF (congestive heart failure) ([facility]CC)   • Thrombus of venous dialysis catheter (HCC)   • Sepsis without acute organ dysfunction (HCC)   • Type 2 diabetes mellitus with hyperglycemia, with long-term current use of insulin (HCC)   • Acute respiratory failure with hypoxia (HCC)   • Acute on chronic systolic CHF (congestive heart failure) (HCC)   • Hyperkalemia   • Acute respiratory failure with hypoxia (HCC)   • Other hypervolemia   • Hematoma of right hip, initial encounter   • Ureteral stent present   • Closed intertrochanteric fracture of right femur (HCC)   • Witnessed seizure-like activity (HCC)   • Acute respiratory failure with hypercapnia (HCC)   • Opioid use   • Diabetic muscle infarction (HCC)   • Acute posthemorrhagic anemia   • Kidney hematoma   • Pyuria   • Severe malnutrition (HCC)     Past Medical History:   Diagnosis Date   • Acute CVA (cerebrovascular accident) (HCC) 05/01/2022   • Acute on chronic diastolic CHF (congestive heart failure) (HCC)    • Anemia    • CAD (coronary artery disease) 12/20/2021   • Diabetes (HCC)    • Disease of thyroid gland    • GERD (gastroesophageal reflux disease)    • History of intracranial hemorrhage 5/1/2022   • Hyperlipidemia 11/30/2018   • Hypertension    •  Rheumatoid arthritis (HCC)    • Stage 5 chronic kidney disease (HCC)      Past Surgical History:   Procedure Laterality Date   • CHOLECYSTECTOMY WITH INTRAOPERATIVE CHOLANGIOGRAM N/A 1/10/2022    Procedure: Laparoscopic cholecystectomy with intraoperative cholangiogram;  Surgeon: Ramana Raygoza MD;  Location: [facility];  Service: General;  Laterality: N/A;   • CYSTOSCOPY W/ URETERAL STENT PLACEMENT Left 9/29/2022    Procedure: CYSTOSCOPY URETERAL STENT INSERTION WITH RETROGRADE PYELOGRAM;  Surgeon: Bryant Phan Jr., MD;  Location: [facility] OR;  Service: Urology;  Laterality: Left;   • CYSTOSCOPY W/ URETERAL STENT PLACEMENT Left 1/10/2023    Procedure: CYSTOSCOPY LEFT STENT REMOVAL;  Surgeon: Bryant Phan Jr., MD;  Location: [facility] OR;  Service: Urology;  Laterality: Left;   • EMBOLIZATION MESENTERIC ARTERY N/A 1/1/2023    Procedure: LEFT KIDNEY EMBOLIZATION;  Surgeon: Bijan Ordoñez MD;  Location: [facility] OR 18/19;  Service: Interventional Radiology;  Laterality: N/A;   • EYE SURGERY     • FEMUR IM NAILING/RODDING Right 11/10/2022    Procedure: FEMUR INTRAMEDULLARY NAILING/RODDING;  Surgeon: Glen Pierce MD;  Location: [facility] OR;  Service: Orthopedics;  Laterality: Right;   • HIP INTERTROCHANTERIC NAILING Right 11/10/2022    Procedure: HIP INTERTROCHANTERIC NAILING;  Surgeon: Glen Pierce MD;  Location: [facility] OR;  Service: Orthopedics;  Laterality: Right;   • HYSTERECTOMY     • INSERT CENTRAL LINE AT BEDSIDE  12/31/2022        • INSERTION HEMODIALYSIS CATHETER N/A 12/6/2021    Procedure: HEMODIALYSIS CATHETER INSERTION;  Surgeon: Keli Salazar MD;  Location: [facility] OR 18/19;  Service: Vascular;  Laterality: N/A;   • INSERTION HEMODIALYSIS CATHETER N/A 5/3/2022    Procedure: TUNNELED CATHETER PLACEMENT;  Surgeon: Keli Salazar MD;  Location: [facility] OR;  Service: Vascular;  Laterality: N/A;   • MUSCLE BIOPSY Left  6/15/2022    Procedure: Left quadriceps muscle biopsy;  Surgeon: Marques Ma MD;  Location[facility];  Service: Neurosurgery;  Laterality: Left;   • URETEROSCOPY LASER LITHOTRIPSY WITH STENT INSERTION Left 12/30/2022    Procedure: CYSTOSCOPY WITH LEFT  URETEROSCOPY  LEFT STENT EXCHANGE;  Surgeon: Bryant Phan Jr., MD;  Location[facility];  Service: Urology;  Laterality: Left;      General Information     Row Name 02/02/23 [facility]7          Physical Therapy Time and Intention    Document Type evaluation  -PC     Mode of Treatment physical therapy  -PC     Row Name 02/02/23 1357          General Information    Patient Profile Reviewed yes  -PC     Prior Level of Function --  pt with recent discharge to home from hospital, requiring assist for all mobility  -PC     Barriers to Rehab medically complex;previous functional deficit;cognitive status  -PC     Row Name 02/02/23 1357          Living Environment    People in Home spouse  -PC     Row Name 02/02/23 1357          Cognition    Orientation Status (Cognition) oriented x 3  -PC     Row Name 02/02/23 6865          Safety Issues, Functional Mobility    Safety Issues Affecting Function (Mobility) insight into deficits/self-awareness;judgment;safety precautions follow-through/compliance  -PC     Impairments Affecting Function (Mobility) endurance/activity tolerance;strength  -PC     Cognitive Impairments, Mobility Safety/Performance insight into deficits/self-awareness  -PC     Comment, Safety Issues/Impairments (Mobility) pt tearful throughout session, moaning with back pain  -PC           User Key  (r) = Recorded By, (t) = Taken By, (c) = Cosigned By    Initials Name Provider Type    PC Cristela Perez, PT Physical Therapist               Mobility     Row Name 02/02/23 7159          Bed Mobility    Bed Mobility supine-sit;sit-supine  -PC     Supine-Sit [place] (Bed Mobility) moderate assist (50% patient effort);2 person assist  -PC     Sit-Supine  [place] (Bed Mobility) maximum assist (25% patient effort);2 person assist  -PC     Assistive Device (Bed Mobility) head of bed elevated;draw sheet;bed rails  -PC     Comment, (Bed Mobility) pt needed extra time, verbal cues, encouragement  -PC     Row Name 02/02/23 1402          Transfers    Comment, (Transfers) not attemped today due to weakness, pt crying  -PC           User Key  (r) = Recorded By, (t) = Taken By, (c) = Cosigned By    Initials Name Provider Type    PC Cristela Perez, PT Physical Therapist               Obj/Interventions     Row Name 02/02/23 1403          Range of Motion Comprehensive    General Range of Motion bilateral lower extremity ROM WFL  -PC     Row Name 02/02/23 1403          Strength Comprehensive (MMT)    Comment, General Manual Muscle Testing (MMT) Assessment B LE 3-/5  -PC     Row Name 02/02/23 1403          Balance    Balance Assessment sitting static balance;sitting dynamic balance  -PC     Static Sitting Balance contact guard  -PC     Dynamic Sitting Balance minimal assist  -PC     Position, Sitting Balance sitting edge of bed  -PC           User Key  (r) = Recorded By, (t) = Taken By, (c) = Cosigned By    Initials Name Provider Type    PC Cristela Perez, PT Physical Therapist               Goals/Plan     Row Name 02/02/23 1408          Bed Mobility Goal 1 (PT)    Activity/Assistive Device (Bed Mobility Goal 1, PT) sit to supine/supine to sit  -PC     [place] Level/Cues Needed (Bed Mobility Goal 1, PT) moderate assist (50-74% patient effort);minimum assist (75% or more patient effort)  -PC     Time Frame (Bed Mobility Goal 1, PT) 2 weeks  -PC     Row Name 02/02/23 1408          Transfer Goal 1 (PT)    Activity/Assistive Device (Transfer Goal 1, PT) sit-to-stand/stand-to-sit;bed-to-chair/chair-to-bed  -PC     [place] Level/Cues Needed (Transfer Goal 1, PT) moderate assist (50-74% patient effort)  -PC     Time Frame (Transfer Goal 1, PT) 2 weeks  -     Row Name  02/02/23 1408          Gait Training Goal 1 (PT)    Activity/Assistive Device (Gait Training Goal 1, PT) gait (walking locomotion);assistive device use  -PC     [place] Level (Gait Training Goal 1, PT) moderate assist (50-74% patient effort)  -PC     Distance (Gait Training Goal 1, PT) 5 ft  -PC     Time Frame (Gait Training Goal 1, PT) 2 weeks  -PC     Row Name 02/02/23 1408          Therapy Assessment/Plan (PT)    Planned Therapy Interventions (PT) bed mobility training;gait training;transfer training;strengthening;balance training  -PC           User Key  (r) = Recorded By, (t) = Taken By, (c) = Cosigned By    Initials Name Provider Type    PC Cristela Perez, PT Physical Therapist               Clinical Impression     Row Name 02/02/23 1404          Pain    Pretreatment Pain Rating 8/10  -PC     Pain Location - back  -PC     Pain Intervention(s) Repositioned  -PC     Row Name 02/02/23 1404          Plan of Care Review    Plan of Care Reviewed With patient  -PC     Outcome Evaluation Pt is a 58 yo female adm with weakness, recently discharged from here 1/29, pt has a complex medical history and was here for over a month with L renal hematoma, hemorrhagic shock, had a renal artery embolization, and stent removal. Hx of ESRD, on HD, DM, HTN, CVA. Pt presents with signficant weakness, pain, and impaired functional mobiltiy, she will benefit from PT to address, she required moderate/max assist today to sit edge of bed, she fatigued quickly and was not able to stand. At current level, SNF is recommended, PT will follow while pt is an inpatient  -     Row Name 02/02/23 1406          Therapy Assessment/Plan (PT)    Rehab Potential (PT) fair, will monitor progress closely  -PC     Criteria for Skilled Interventions Met (PT) yes;meets criteria  -PC     Therapy Frequency (PT) 5 times/wk  -PC     Row Name 02/02/23 1409          Positioning and Restraints    Pre-Treatment Position in bed  -PC     Post Treatment  Position bed  -PC     In Bed supine;call light within reach;encouraged to call for assist;exit alarm on  -PC           User Key  (r) = Recorded By, (t) = Taken By, (c) = Cosigned By    Initials Name Provider Type    PC Cristela Perez, PT Physical Therapist               Outcome Measures     Row Name 02/02/23 1409          How much help from another person do you currently need...    Turning from your back to your side while in flat bed without using bedrails? 2  -PC     Moving from lying on back to sitting on the side of a flat bed without bedrails? 2  -PC     Moving to and from a bed to a chair (including a wheelchair)? 1  -PC     Standing up from a chair using your arms (e.g., wheelchair, bedside chair)? 1  -PC     Climbing 3-5 steps with a railing? 1  -PC     To walk in hospital room? 1  -PC     AM-PAC 6 Clicks Score (PT) 8  -PC     Highest level of mobility 3 --> Sat at edge of bed  -PC     Row Name 02/02/23 1409          Functional Assessment    Outcome Measure Options AM-PAC 6 Clicks Basic Mobility (PT)  -PC           User Key  (r) = Recorded By, (t) = Taken By, (c) = Cosigned By    Initials Name Provider Type    PC Cristela Perez PT Physical Therapist                             Physical Therapy Education     Title: PT OT SLP Therapies (Done)     Topic: Physical Therapy (Done)     Point: Mobility training (Done)     Learning Progress Summary           Patient Acceptance, E,D, DU by PC at 2/2/2023 1409                   Point: Home exercise program (Done)     Learning Progress Summary           Patient Acceptance, E,D, DU by PC at 2/2/2023 1409                   Point: Body mechanics (Done)     Learning Progress Summary           Patient Acceptance, E,D, DU by PC at 2/2/2023 1409                   Point: Precautions (Done)     Learning Progress Summary           Patient Acceptance, E,D, DU by PC at 2/2/2023 1409                               User Key     Initials Effective Dates Name Provider Type  Discipline    PC 06/16/21 -  Cristela Perez PT Physical Therapist PT              PT Recommendation and Plan  Planned Therapy Interventions (PT): bed mobility training, gait training, transfer training, strengthening, balance training  Plan of Care Reviewed With: patient  Outcome Evaluation: Pt is a 56 yo female adm with weakness, recently discharged from here 1/29, pt has a complex medical history and was here for over a month with L renal hematoma, hemorrhagic shock, had a renal artery embolization, and stent removal. Hx of ESRD, on HD, DM, HTN, CVA. Pt presents with signficant weakness, pain, and impaired functional mobiltiy, she will benefit from PT to address, she required moderate/max assist today to sit edge of bed, she fatigued quickly and was not able to stand. At current level, SNF is recommended, PT will follow while pt is an inpatient     Time Calculation:    PT Charges     Row Name 02/02/23 1410             Time Calculation    Start Time 1315  -PC      Stop Time 1331  -PC      Time Calculation (min) 16 min  -PC      PT Received On 02/02/23  -PC      PT - Next Appointment 02/03/23  -PC      PT Goal Re-Cert Due Date 02/16/23  -PC            User Key  (r) = Recorded By, (t) = Taken By, (c) = Cosigned By    Initials Name Provider Type    PC Cristela Perez PT Physical Therapist              Therapy Charges for Today     Code Description Service Date Service Provider Modifiers Qty    19279769849  PT EVAL MOD COMPLEXITY 2 2/2/2023 Cristela Perez, PT GP 1    [id number] HC PT THER PROC EA 15 MIN 2/2/2023 Cristela Perez, PT GP 1          PT G-Codes  Outcome Measure Options: AM-PAC 6 Clicks Basic Mobility (PT)  AM-PAC 6 Clicks Score (PT): 8  PT Discharge Summary  Anticipated Discharge Disposition (PT): skilled nursing facility    Cristela Perez PT  2/2/2023

## 2023-02-02 NOTE — PLAN OF CARE
Goal Outcome Evaluation:              Outcome Evaluation: VSS, minimal complaints of pain.  Lidocaine patch and heating pad ordered.  Pt had dialysis this afternoon.  PT consulted.  Plan is for patient to be discharged to rehab, awaiting an accepting facility.

## 2023-02-02 NOTE — PROGRESS NOTES
Name: Zaina Martinez ADMIT: 2023   : 1965  PCP: Provider, No Known    MRN: [id number] LOS: 0 days   AGE/SEX: 57 y.o. female  ROOM: [facility]     Subjective   Subjective     No events overnight. She complains of fatigue and generalized pain, but no specific symptoms and no dysuria. She has been admitted here 17 times in the last 13 months and has refused rehab multiple times. She is now agreeable to go to rehab. She is also agreeable to speak with palliative care, though she wishes to remain full code at this time.       Objective   Objective   Vital Signs  Temp:  [98 °F (36.7 °C)-98.7 °F (37.1 °C)] 98.3 °F (36.8 °C)  Heart Rate:  [71-81] 81  Resp:  [17-18] 17  BP: (157-175)/(77-96) 166/89  SpO2:  [87 %-100 %] 100 %  on  Flow (L/min):  [1-2] 1;   Device (Oxygen Therapy): nasal cannula  Body mass index is 23.81 kg/m².  Physical Exam  Constitutional:       General: She is not in acute distress.     Appearance: She is not toxic-appearing.   Cardiovascular:      Rate and Rhythm: Normal rate and regular rhythm.      Heart sounds: Normal heart sounds.   Pulmonary:      Effort: Pulmonary effort is normal. No respiratory distress.      Breath sounds: Normal breath sounds. No wheezing, rhonchi or rales.   Abdominal:      General: Bowel sounds are normal. There is no distension.      Palpations: Abdomen is soft.      Tenderness: There is no abdominal tenderness. There is no guarding or rebound.   Musculoskeletal:         General: No tenderness.      Right lower leg: No edema.      Left lower leg: No edema.   Neurological:      Mental Status: She is alert.   Psychiatric:         Mood and Affect: Mood normal.         Behavior: Behavior normal.         Results Review     I reviewed the patient's new clinical results.  Results from last 7 days   Lab Units 23  1109 23  0609 23  0518   WBC 10*3/mm3 14.01* 16.87* 19.20*   HEMOGLOBIN g/dL 11.2* 10.9* 11.6*   PLATELETS 10*3/mm3 330 412 459*     Results from  last 7 days   Lab Units 02/01/23  1109 01/28/23  0609 01/27/23  0518   SODIUM mmol/L 133* 131* 131*   POTASSIUM mmol/L 3.7 3.7 3.7   CHLORIDE mmol/L 96* 100 99   CO2 mmol/L 26.0 21.4* 21.6*   BUN mg/dL 25* 19 24*   CREATININE mg/dL 3.13* 1.99* 2.20*   GLUCOSE mg/dL 190* 201* 211*   Estimated Creatinine Clearance: 18.5 mL/min (A) (by C-G formula based on SCr of 3.13 mg/dL (H)).  Results from last 7 days   Lab Units 02/01/23  1109 01/28/23  0609 01/27/23  0518   ALBUMIN g/dL 2.9* 2.7* 2.9*   BILIRUBIN mg/dL 0.6  --  0.5   ALK PHOS U/L 226*  --  359*   AST (SGOT) U/L 12  --  12   ALT (SGPT) U/L <5  --  <5     Results from last 7 days   Lab Units 02/01/23  1109 01/28/23  0609 01/27/23  0518   CALCIUM mg/dL 8.2* 8.1* 8.1*   ALBUMIN g/dL 2.9* 2.7* 2.9*   PHOSPHORUS mg/dL 2.9 1.3*  --      Results from last 7 days   Lab Units 02/01/23  1109   LACTATE mmol/L 0.9     COVID19   Date Value Ref Range Status   02/01/2023 Not Detected Not Detected - Ref. Range Final   12/26/2022 Not Detected Not Detected - Ref. Range Final   11/09/2022 Not Detected Not Detected - Ref. Range Final   10/26/2022 Not Detected Not Detected - Ref. Range Final   09/03/2022 Not Detected Not Detected - Ref. Range Final     Hemoglobin A1C   Date/Time Value Ref Range Status   02/02/2023 0923 6.20 (H) 4.80 - 5.60 % Final     Glucose   Date/Time Value Ref Range Status   02/02/2023 1121 208 (H) 70 - 130 mg/dL Final     Comment:     Meter: [id number] : [id number] Raina SAAVEDRA   02/02/2023 0604 81 70 - 130 mg/dL Final     Comment:     Meter: [id number] : [id number] Ernst Rosa QUENTIN   02/01/2023 2110 100 70 - 130 mg/dL Final     Comment:     Meter: [id number] : [id number] Isabel Torre RN       XR Chest 1 View  Narrative: XR CHEST 1 VW-     HISTORY: Female who is 57 years-old,  cough     TECHNIQUE: Frontal view of the chest     COMPARISON: 01/22/2023     FINDINGS: Right-sided central venous catheter appears stable. NG tube  has been removed.  Patient is rotated towards the right. The heart  appears mildly enlarged. Pulmonary vasculature unremarkable. Small left  basilar atelectasis/infiltrate/effusion, appears decreased from the  prior exam. No pneumothorax. No acute osseous process.     Impression: Small left basilar atelectasis/infiltrate/effusion, appears  decreased from the prior exam.     This report was finalized on 2/1/2023 11:29 AM by Dr. Jared Kern M.D.       Scheduled Medications  amLODIPine, 10 mg, Oral, Q24H  aspirin, 81 mg, Oral, Daily  carvedilol, 3.125 mg, Oral, BID With Meals  folic acid, 1 mg, Oral, Daily  heparin (porcine), 5,000 Units, Subcutaneous, Q8H  insulin glargine, 3 Units, Subcutaneous, Nightly  insulin lispro, 0-9 Units, Subcutaneous, TID With Meals  lactulose, 10 g, Oral, BID  levETIRAcetam, 250 mg, Oral, BID  levothyroxine, 300 mcg, Oral, Q AM  pantoprazole, 40 mg, Oral, Daily  sertraline, 100 mg, Oral, Daily    Infusions   Diet  Diet: Renal Diets; Low Sodium (2-3g), Low Potassium; Texture: Regular Texture (IDDSI 7); Fluid Consistency: Thin (IDDSI 0)       Assessment/Plan     Active Hospital Problems    Diagnosis  POA   • **Generalized weakness [R53.1]  Yes   • Severe malnutrition ([facility]) [E43]  Yes   • Pyuria [R82.81]  Yes   • Chronic systolic CHF (congestive heart failure) ([facility]) [I50.22]  Yes   • History of stroke- R MCA s/p TPA with subsequent ICH with debility [Z86.73]  Not Applicable   • ESRD (end stage renal disease) ([facility]) [N18.6]  Yes   • Hypothyroidism [E03.9]  Yes   • Hypertensive disorder [I10]  Yes   • Type 2 diabetes mellitus with kidney complication, with long-term current use of insulin ([facility]) [E11.29, Z79.4]  Not Applicable      Resolved Hospital Problems   No resolved problems to display.       57 y.o. female admitted with Pyuria.    · Generalized weakness-this is the patient's 18th admission in 13 months. She has refused rehab in the past, but is agreeable this time and her  cannot care for her  at home. CCP is working on getting her set up with a nursing facility  · Pyuria-asymptomatic. ID recommends monitoring off abx  · ESRD on HD-nephrology is consulted for routine dialysis  · Chronic systolic heart failure with recovered EF-on coreg. Unclear why not on acei. Cannot tolerate SGLT2i with diabetes   · Type 2 diabetes on insulin-a1c 6.2. glucose is adequately controlled. Continue home regimen and sliding scale  · Hypertension-adequate control on amlodipine and coreg  · Hypothyroidism-synthroid. She did have trouble correcting her TSH despite supplementation for a month during her prior hospitalization and required IV synthroid. Check a free t4  · Rheumatoid arthritis-previously on chronic steroids, but these appear to have been stopped several months ago. Check am cortisol  · Paroxysmal afib-not a candidate for AC given recent hemorrhagic shock. On asa and coreg  · Seizure disorder-keppra  · GERD-ppi  · History of CVA  · Recent right femur fracture  · History of left renal hematoma causing life-threatening hemorrhagic shock s/p left renal artery embolization on 1/1/23-no longer a candidate for AC  · Heparin SC for DVT prophylaxis.  · Full code.  · Discussed with patient and nursing staff.  · Anticipate discharge to SNU facility once arrangements have been made.      Ronnie Baptiste MD  [facility]  02/02/23  11:51 EST    I wore protective equipment throughout this patient encounter including a face mask, gloves and protective eyewear.  Hand hygiene was performed before donning protective equipment and after removal when leaving the room.

## 2023-02-02 NOTE — DISCHARGE PLACEMENT REQUEST
"Zaina Martinez (57 y.o. Female)     Date of Birth   1965    Social Security Number       Address   [address] [place] [address]    Home Phone   [phone]    MRN   [id number]       Baptism   None    Marital Status                               Admission Date   2/1/23    Admission Type   Emergency    Admitting Provider   Starla Boston MD    Attending Provider   Ronnie Baptiste MD    Department, Room/Bed   [address], S612/1       Discharge Date       Discharge Disposition       Discharge Destination                               Attending Provider: Ronnie Baptiste MD    Allergies: Contrast Dye (Echo Or Unknown Ct/mr), Ibuprofen, Prochlorperazine    Isolation: None   Infection: None   Code Status: CPR    Ht: 157.5 cm (62\")   Wt: 59.1 kg (130 lb 3.2 oz)    Admission Cmt: None   Principal Problem: Generalized weakness [R53.1]                 Active Insurance as of 2/1/2023     Primary Coverage     Payor Plan Insurance Group Employer/Plan Group    ANTH MEDICARE REPLACEMENT [facility] MEDICARE ADVANTAGE [id number]     Payor Plan Address Payor Plan Phone Number Payor Plan Fax Number Effective Dates    [address] [phone]  1/1/2019 - None Entered    [facility] [id number]       Subscriber Name Subscriber Birth Date Member ID       ZAINA MARTINEZ 1965 [id number]                 Emergency Contacts      (Rel.) Home Phone Work Phone Mobile Phone    JuanFiona (Daughter) [phone] -- [phone]    Marv Martinez (Spouse) -- -- [phone]              "

## 2023-02-02 NOTE — PLAN OF CARE
Goal Outcome Evaluation:  Plan of Care Reviewed With: patient        Progress: no change  Outcome Evaluation: Patient resting well this shift.  VSS.  IVF.  Pain managed with one time med order.  Awaiting new orders from provider.  Will continue to monitor labs and reassess as needed.

## 2023-02-02 NOTE — PLAN OF CARE
Problem: Skin Injury Risk Increased  Goal: Skin Health and Integrity  Intervention: Promote and Optimize Oral Intake  Recent Flowsheet Documentation  Taken 2/2/2023 1450 by Yashira Garcia RD  Oral Nutrition Promotion:   calorie-dense foods provided   calorie-dense liquids provided   nutritional therapy counseling provided     Problem: Malnutrition  Goal: Improved Nutritional Intake  Outcome: Ongoing, Not Progressing  Intervention: Promote and Optimize Oral Intake  Flowsheets (Taken 2/2/2023 1450)  Oral Nutrition Promotion:   calorie-dense foods provided   calorie-dense liquids provided   nutritional therapy counseling provided   Goal Outcome Evaluation:   Novasource Renal TID (cafe mocha) ordered.   Will follow for adequate po intake of meals/ONS.

## 2023-02-02 NOTE — CONSULTS
[facility] Consult Note      Patient Name: Zaina Martinez  : 1965  MRN: [id number]  Primary Care Physician:  Provider, No Known  Referring Physician: Starla Boston MD  Date of admission: 2023    Subjective     Reason for Consult: End-stage renal disease    HPI:   Zaina Martinez is a 57 y.o. female was readmitted on 2023 which she could not get out of bed to go to dialysis her  brought her to the ED.  He had prolonged hospitalization in early 2023 she was discharged on 2023 the patient was urged to go to rehab but she refused as she had done on her prior hospitalization she will end up going home and coming back to the hospital because of weakness or inability to get up to go to dialysis.  Patient has end-stage renal disease on maintenance hemodialysis thrice weekly.   She has history of CVA, anemia of ESRD, right hip intertrochanteric femur fracture repair on 11/10/2022, left ureteral stent on 2022 subsequent left renal hemorrhage with left renal artery embolization on 2023, hypertension with ESRD, diabetes mellitus type 2 with renal complication.  History of paroxysmal atrial fibrillation has been off anticoagulation, hypothyroidism on replacement therapy    She is complaining of weakness, no chest pain or shortness of air, no orthopnea or PND, no nausea or vomiting, no abdominal pain         Review of Systems:   14 point review of systems is otherwise negative except for mentioned above on HPI    Personal History     Past Medical History:   Diagnosis Date   • Acute CVA (cerebrovascular accident) (HCC) 2022   • Acute on chronic diastolic CHF (congestive heart failure) ([facility])    • Anemia    • CAD (coronary artery disease) 2021   • Diabetes ([facility])    • Disease of thyroid gland    • GERD (gastroesophageal reflux disease)    • History of intracranial hemorrhage 2022   • Hyperlipidemia 2018   • Hypertension    • Rheumatoid  arthritis (HCC)    • Stage 5 chronic kidney disease (HCC)        Past Surgical History:   Procedure Laterality Date   • CHOLECYSTECTOMY WITH INTRAOPERATIVE CHOLANGIOGRAM N/A 1/10/2022    Procedure: Laparoscopic cholecystectomy with intraoperative cholangiogram;  Surgeon: Ramana Raygoza MD;  Location: [facility] OR;  Service: General;  Laterality: N/A;   • CYSTOSCOPY W/ URETERAL STENT PLACEMENT Left 9/29/2022    Procedure: CYSTOSCOPY URETERAL STENT INSERTION WITH RETROGRADE PYELOGRAM;  Surgeon: Bryant Phan Jr., MD;  Location: [facility] OR;  Service: Urology;  Laterality: Left;   • CYSTOSCOPY W/ URETERAL STENT PLACEMENT Left 1/10/2023    Procedure: CYSTOSCOPY LEFT STENT REMOVAL;  Surgeon: Bryant Phan Jr., MD;  Location: [facility] OR;  Service: Urology;  Laterality: Left;   • EMBOLIZATION MESENTERIC ARTERY N/A 1/1/2023    Procedure: LEFT KIDNEY EMBOLIZATION;  Surgeon: Bijan Ordoñez MD;  Location: [facility] OR 18/19;  Service: Interventional Radiology;  Laterality: N/A;   • EYE SURGERY     • FEMUR IM NAILING/RODDING Right 11/10/2022    Procedure: FEMUR INTRAMEDULLARY NAILING/RODDING;  Surgeon: Glen Pierce MD;  Location: [facility] OR;  Service: Orthopedics;  Laterality: Right;   • HIP INTERTROCHANTERIC NAILING Right 11/10/2022    Procedure: HIP INTERTROCHANTERIC NAILING;  Surgeon: Glen Pierce MD;  Location: [facility] OR;  Service: Orthopedics;  Laterality: Right;   • HYSTERECTOMY     • INSERT CENTRAL LINE AT BEDSIDE  12/31/2022        • INSERTION HEMODIALYSIS CATHETER N/A 12/6/2021    Procedure: HEMODIALYSIS CATHETER INSERTION;  Surgeon: Keli Salazar MD;  Location: [facility] OR 18/19;  Service: Vascular;  Laterality: N/A;   • INSERTION HEMODIALYSIS CATHETER N/A 5/3/2022    Procedure: TUNNELED CATHETER PLACEMENT;  Surgeon: Keli Salazar MD;  Location: [facility]IN OR;  Service: Vascular;  Laterality: N/A;   • MUSCLE BIOPSY Left 6/15/2022     Procedure: Left quadriceps muscle biopsy;  Surgeon: Marques Ma MD;  Location: [facility];  Service: Neurosurgery;  Laterality: Left;   • URETEROSCOPY LASER LITHOTRIPSY WITH STENT INSERTION Left 12/30/2022    Procedure: CYSTOSCOPY WITH LEFT  URETEROSCOPY  LEFT STENT EXCHANGE;  Surgeon: Bryant Phan Jr., MD;  Location: [facility];  Service: Urology;  Laterality: Left;       Family History: family history is not on file.    Social History:  reports that she has never smoked. She has never used smokeless tobacco. She reports that she does not drink alcohol and does not use drugs.    Home Medications:  Prior to Admission medications    Medication Sig Start Date End Date Taking? Authorizing Provider   amLODIPine (NORVASC) 10 MG tablet Take 1 tablet by mouth Daily. 12/9/22  Yes Andria Silva APRN   aspirin 81 MG EC tablet Take 1 tablet by mouth Daily. 1/30/23  Yes Mat Marte MD   carvedilol (COREG) 3.125 MG tablet Take 1 tablet by mouth 2 (Two) Times a Day With Meals. 1/29/23  Yes Mat Marte MD   folic acid (FOLVITE) 1 MG tablet Take 1 mg by mouth Daily.   Yes Doyle Murdock MD   insulin glargine (LANTUS, SEMGLEE) 100 UNIT/ML injection Inject 3 Units under the skin into the appropriate area as directed Every Night. 12/9/22  Yes Andria Silva APRN   insulin lispro (ADMELOG) 100 UNIT/ML injection Inject 0-7 Units under the skin into the appropriate area as directed 3 (Three) Times a Day Before Meals. 11/24/22  Yes Mat Marte MD   lactulose (CHRONULAC) 10 GM/15ML solution Take 15 mL by mouth 2 (Two) Times a Day. 11/24/22  Yes Mat Marte MD   levETIRAcetam (Keppra) 250 MG tablet Take 1 tablet by mouth 2 (Two) Times a Day. 1/29/23  Yes Mat Marte MD   levothyroxine (Synthroid) 150 MCG tablet Take 2 tablets by mouth Daily. 1/29/23  Yes Mat Marte MD   melatonin 3 MG tablet Take 1 tablet by mouth Every Night.  Patient taking differently:  Take 3 mg by mouth At Night As Needed. 10/6/22  Yes Jimmy Armenta DO   pantoprazole (PROTONIX) 40 MG EC tablet Take 1 tablet by mouth Daily. 7/1/22  Yes Adonis Florentino MD   sennosides-docusate (PERICOLACE) 8.6-50 MG per tablet Take 2 tablets by mouth Every Night.  Patient taking differently: Take 2 tablets by mouth At Night As Needed. 11/24/22  Yes Mat Marte MD   sertraline (ZOLOFT) 50 MG tablet Take 2 tablets by mouth Daily. 1/29/23  Yes Mat Marte MD       Allergies:  Allergies   Allergen Reactions   • Contrast Dye (Echo Or Unknown Ct/Mr) Confusion   • Ibuprofen Other (See Comments)     Kidney disease   • Prochlorperazine Other (See Comments)     Dystonic reaction            Objective     Vitals:   Temp:  [98 °F (36.7 °C)-98.7 °F (37.1 °C)] 98.3 °F (36.8 °C)  Heart Rate:  [71-81] 81  Resp:  [17-18] 17  BP: (157-177)/(77-96) 166/89  Flow (L/min):  [1-2] 1    Intake/Output Summary (Last 24 hours) at 2/2/2023 0730  Last data filed at 2/1/2023 2338  Gross per 24 hour   Intake 380 ml   Output --   Net 380 ml       Physical Exam:   Constitutional: Awake, alert, no acute distress.  Chronically   HEENT: Sclera anicteric, no conjunctival injection  Neck: Supple, no thyromegaly, no lymphadenopathy, trachea at midline, no JVD, tunneled dialysis catheter in the right IJ  Respiratory: Scattered rhonchi, nonlabored respiration  Cardiovascular: RRR, no murmurs, no rubs or gallops, no carotid bruit  Gastrointestinal: Positive bowel sounds, abdomen is soft, nontender and nondistended  : No palpable bladder  Musculoskeletal: No edema, no clubbing or cyanosis  Psychiatric: Flat affect affect, cooperative  Neurologic: Awake and, moving all extremities, normal speech and mental status  Skin: Warm and dry       Scheduled Meds:     amLODIPine, 10 mg, Oral, Q24H  aspirin, 81 mg, Oral, Daily  carvedilol, 3.125 mg, Oral, BID With Meals  cefTRIAXone, 1 g, Intravenous, Q24H  folic acid, 1 mg, Oral, Daily  insulin  glargine, 3 Units, Subcutaneous, Nightly  insulin lispro, 0-9 Units, Subcutaneous, TID With Meals  lactulose, 10 g, Oral, BID  levETIRAcetam, 250 mg, Oral, BID  levothyroxine, 300 mcg, Oral, Q AM  pantoprazole, 40 mg, Oral, Daily  sertraline, 100 mg, Oral, Daily      IV Meds:   sodium chloride, 75 mL/hr, Last Rate: 75 mL/hr (02/02/23 0623)        Results Reviewed:   I have personally reviewed the results from the time of this admission to 2/2/2023 07:30 EST     Lab Results   Component Value Date    GLUCOSE 190 (H) 02/01/2023    CALCIUM 8.2 (L) 02/01/2023     (L) 02/01/2023    K 3.7 02/01/2023    CO2 26.0 02/01/2023    CL 96 (L) 02/01/2023    BUN 25 (H) 02/01/2023    CREATININE 3.13 (H) 02/01/2023    EGFRIFAFRI  01/13/2022      Comment:      <15 Indicative of kidney failure.    EGFRIFNONA 14 (L) 02/28/2022    BCR 8.0 02/01/2023    ANIONGAP 11.0 02/01/2023      Lab Results   Component Value Date    MG 2.2 01/24/2023    PHOS 2.9 02/01/2023    ALBUMIN 2.9 (L) 02/01/2023           Assessment / Plan     ASSESSMENT:  -End-stage renal disease and diabetic and hypertensive glomerulosclerosis on maintenance hemodialysis thrice weekly, dialysis every Tuesday, Thursday and Saturday  -Severe weakness prompting admission  -Diabetes mellitus type 2 with renal complete  -Hypertension with chronic kidney disease borderline controlled  -Hypothyroidism on replacement therapy  -Anemia of chronic kidney disease hemoglobin 11.23 with long-acting ANDRAE as an outpatient    PLAN:  -Hemodialysis today  -I encouraged the patient to be transferred to rehab facility when ready for discharge since she has not been able to safely stay at home  -Surveillance lab    Thank you for involving us in the care of Zaina Martinez.  Please feel free to call with any questions.    Lei Covington MD  02/02/23  07:30 EST    [facility]  [phone]      Please note that portions of this note were completed with a voice  recognition program.

## 2023-02-03 LAB
ALBUMIN SERPL-MCNC: 2.9 G/DL (ref 3.5–5.2)
ANION GAP SERPL CALCULATED.3IONS-SCNC: 13.1 MMOL/L (ref 5–15)
BASOPHILS # BLD AUTO: 0.03 10*3/MM3 (ref 0–0.2)
BASOPHILS NFR BLD AUTO: 0.2 % (ref 0–1.5)
BUN SERPL-MCNC: 14 MG/DL (ref 6–20)
BUN/CREAT SERPL: 6.6 (ref 7–25)
C DIFF TOX GENS STL QL NAA+PROBE: NEGATIVE
CALCIUM SPEC-SCNC: 8.2 MG/DL (ref 8.6–10.5)
CHLORIDE SERPL-SCNC: 99 MMOL/L (ref 98–107)
CO2 SERPL-SCNC: 19.9 MMOL/L (ref 22–29)
CORTIS SERPL-MCNC: 19.1 MCG/DL
CREAT SERPL-MCNC: 2.11 MG/DL (ref 0.57–1)
DEPRECATED RDW RBC AUTO: 49.1 FL (ref 37–54)
EGFRCR SERPLBLD CKD-EPI 2021: 26.9 ML/MIN/1.73
EOSINOPHIL # BLD AUTO: 0 10*3/MM3 (ref 0–0.4)
EOSINOPHIL NFR BLD AUTO: 0 % (ref 0.3–6.2)
ERYTHROCYTE [DISTWIDTH] IN BLOOD BY AUTOMATED COUNT: 15.8 % (ref 12.3–15.4)
GLUCOSE BLDC GLUCOMTR-MCNC: 163 MG/DL (ref 70–130)
GLUCOSE BLDC GLUCOMTR-MCNC: 184 MG/DL (ref 70–130)
GLUCOSE BLDC GLUCOMTR-MCNC: 197 MG/DL (ref 70–130)
GLUCOSE SERPL-MCNC: 169 MG/DL (ref 65–99)
HCT VFR BLD AUTO: 34.8 % (ref 34–46.6)
HGB BLD-MCNC: 10.5 G/DL (ref 12–15.9)
IMM GRANULOCYTES # BLD AUTO: 0.09 10*3/MM3 (ref 0–0.05)
IMM GRANULOCYTES NFR BLD AUTO: 0.7 % (ref 0–0.5)
LYMPHOCYTES # BLD AUTO: 0.8 10*3/MM3 (ref 0.7–3.1)
LYMPHOCYTES NFR BLD AUTO: 6.6 % (ref 19.6–45.3)
MCH RBC QN AUTO: 25.9 PG (ref 26.6–33)
MCHC RBC AUTO-ENTMCNC: 30.2 G/DL (ref 31.5–35.7)
MCV RBC AUTO: 85.7 FL (ref 79–97)
MONOCYTES # BLD AUTO: 1.15 10*3/MM3 (ref 0.1–0.9)
MONOCYTES NFR BLD AUTO: 9.5 % (ref 5–12)
NEUTROPHILS NFR BLD AUTO: 10.03 10*3/MM3 (ref 1.7–7)
NEUTROPHILS NFR BLD AUTO: 83 % (ref 42.7–76)
NRBC BLD AUTO-RTO: 0 /100 WBC (ref 0–0.2)
PHOSPHATE SERPL-MCNC: 2 MG/DL (ref 2.5–4.5)
PLATELET # BLD AUTO: 264 10*3/MM3 (ref 140–450)
PMV BLD AUTO: 9.8 FL (ref 6–12)
POTASSIUM SERPL-SCNC: 3.8 MMOL/L (ref 3.5–5.2)
RBC # BLD AUTO: 4.06 10*6/MM3 (ref 3.77–5.28)
SODIUM SERPL-SCNC: 132 MMOL/L (ref 136–145)
WBC NRBC COR # BLD: 12.1 10*3/MM3 (ref 3.4–10.8)

## 2023-02-03 PROCEDURE — 25010000002 HEPARIN (PORCINE) PER 1000 UNITS: Performed by: STUDENT IN AN ORGANIZED HEALTH CARE EDUCATION/TRAINING PROGRAM

## 2023-02-03 PROCEDURE — 85025 COMPLETE CBC W/AUTO DIFF WBC: CPT | Performed by: INTERNAL MEDICINE

## 2023-02-03 PROCEDURE — 82962 GLUCOSE BLOOD TEST: CPT

## 2023-02-03 PROCEDURE — 63710000001 INSULIN LISPRO (HUMAN) PER 5 UNITS: Performed by: INTERNAL MEDICINE

## 2023-02-03 PROCEDURE — 97530 THERAPEUTIC ACTIVITIES: CPT

## 2023-02-03 PROCEDURE — 96372 THER/PROPH/DIAG INJ SC/IM: CPT

## 2023-02-03 PROCEDURE — G0378 HOSPITAL OBSERVATION PER HR: HCPCS

## 2023-02-03 PROCEDURE — 82533 TOTAL CORTISOL: CPT | Performed by: STUDENT IN AN ORGANIZED HEALTH CARE EDUCATION/TRAINING PROGRAM

## 2023-02-03 PROCEDURE — 80069 RENAL FUNCTION PANEL: CPT | Performed by: INTERNAL MEDICINE

## 2023-02-03 RX ORDER — ACETAMINOPHEN 325 MG/1
650 TABLET ORAL EVERY 6 HOURS PRN
Status: DISCONTINUED | OUTPATIENT
Start: 2023-02-03 | End: 2023-02-11 | Stop reason: HOSPADM

## 2023-02-03 RX ORDER — HYDROXYZINE HYDROCHLORIDE 25 MG/1
25 TABLET, FILM COATED ORAL 3 TIMES DAILY PRN
Status: DISCONTINUED | OUTPATIENT
Start: 2023-02-03 | End: 2023-02-04

## 2023-02-03 RX ORDER — IBUPROFEN 600 MG/1
1 TABLET ORAL
Status: DISCONTINUED | OUTPATIENT
Start: 2023-02-03 | End: 2023-02-11 | Stop reason: HOSPADM

## 2023-02-03 RX ORDER — LISINOPRIL 10 MG/1
10 TABLET ORAL
Status: DISCONTINUED | OUTPATIENT
Start: 2023-02-03 | End: 2023-02-04

## 2023-02-03 RX ORDER — ROPINIROLE 2 MG/1
2 TABLET, FILM COATED ORAL NIGHTLY
Status: DISCONTINUED | OUTPATIENT
Start: 2023-02-03 | End: 2023-02-11 | Stop reason: HOSPADM

## 2023-02-03 RX ORDER — HYDROXYZINE HYDROCHLORIDE 25 MG/1
25 TABLET, FILM COATED ORAL ONCE
Status: COMPLETED | OUTPATIENT
Start: 2023-02-03 | End: 2023-02-03

## 2023-02-03 RX ADMIN — AMLODIPINE BESYLATE 10 MG: 10 TABLET ORAL at 09:35

## 2023-02-03 RX ADMIN — INSULIN LISPRO 2 UNITS: 100 INJECTION, SOLUTION INTRAVENOUS; SUBCUTANEOUS at 09:37

## 2023-02-03 RX ADMIN — LEVETIRACETAM 250 MG: 250 TABLET, FILM COATED ORAL at 21:07

## 2023-02-03 RX ADMIN — LEVOTHYROXINE SODIUM 300 MCG: 0.15 TABLET ORAL at 05:33

## 2023-02-03 RX ADMIN — LISINOPRIL 10 MG: 10 TABLET ORAL at 18:24

## 2023-02-03 RX ADMIN — INSULIN LISPRO 2 UNITS: 100 INJECTION, SOLUTION INTRAVENOUS; SUBCUTANEOUS at 18:24

## 2023-02-03 RX ADMIN — HYDROXYZINE HYDROCHLORIDE 25 MG: 25 TABLET ORAL at 02:13

## 2023-02-03 RX ADMIN — ASPIRIN 81 MG: 81 TABLET, COATED ORAL at 09:35

## 2023-02-03 RX ADMIN — HYDROXYZINE HYDROCHLORIDE 25 MG: 25 TABLET ORAL at 15:32

## 2023-02-03 RX ADMIN — INSULIN LISPRO 2 UNITS: 100 INJECTION, SOLUTION INTRAVENOUS; SUBCUTANEOUS at 12:19

## 2023-02-03 RX ADMIN — SERTRALINE 100 MG: 100 TABLET, FILM COATED ORAL at 05:32

## 2023-02-03 RX ADMIN — HEPARIN SODIUM 5000 UNITS: 5000 INJECTION INTRAVENOUS; SUBCUTANEOUS at 18:24

## 2023-02-03 RX ADMIN — CARVEDILOL 3.12 MG: 3.12 TABLET, FILM COATED ORAL at 09:35

## 2023-02-03 RX ADMIN — LEVETIRACETAM 250 MG: 250 TABLET, FILM COATED ORAL at 09:35

## 2023-02-03 RX ADMIN — PANTOPRAZOLE SODIUM 40 MG: 40 TABLET, DELAYED RELEASE ORAL at 09:35

## 2023-02-03 RX ADMIN — HYDROXYZINE HYDROCHLORIDE 25 MG: 25 TABLET ORAL at 23:31

## 2023-02-03 RX ADMIN — ROPINIROLE 2 MG: 2 TABLET, FILM COATED ORAL at 23:31

## 2023-02-03 RX ADMIN — FOLIC ACID 1 MG: 1 TABLET ORAL at 09:35

## 2023-02-03 RX ADMIN — HEPARIN SODIUM 5000 UNITS: 5000 INJECTION INTRAVENOUS; SUBCUTANEOUS at 09:48

## 2023-02-03 RX ADMIN — CARVEDILOL 3.12 MG: 3.12 TABLET, FILM COATED ORAL at 18:24

## 2023-02-03 RX ADMIN — ACETAMINOPHEN 650 MG: 325 TABLET, FILM COATED ORAL at 21:48

## 2023-02-03 NOTE — PROGRESS NOTES
[facility] Progress Note      Patient Name: Zaina Martinez  : 1965  MRN: [id number]  Primary Care Physician:  Provider, No Known  Date of admission: 2023    Subjective     Interval History:   Follow-up end-stage renal disease    Patient had dialysis yesterday without any difficulties finally she is agreeable to go to skilled nursing facility, she is complaining of being weak, no chest pain or shortness of air, no orthopnea or PND, no abdominal pain.    Review of Systems:   As noted above    Objective     Vitals:   Temp:  [97.5 °F (36.4 °C)-99 °F (37.2 °C)] 98.4 °F (36.9 °C)  Heart Rate:  [82-89] 86  Resp:  [16-18] 16  BP: (156-186)/(73-97) 175/88  Flow (L/min):  [1-2] 1    Intake/Output Summary (Last 24 hours) at 2/3/2023 0853  Last data filed at 2/3/2023 0100  Gross per 24 hour   Intake 510 ml   Output 2000 ml   Net -1490 ml       Physical Exam:    General Appearance: Awake, alert, chronically ill, no acute distress and frail  Skin: warm and dry  HEENT: oral mucosa normal, nonicteric sclera  Neck: supple, no JVD,  tunneled catheter in the right IJ  Lungs: Scattered rhonchi, breathing effort not labored  Heart: RRR, normal S1 and S2, no rub  Abdomen: soft, nontender, nondistended, normoactive bowel  : no palpable bladder  Extremities: no edema, cyanosis or clubbing  Neuro: normal speech and mental status     Scheduled Meds:     amLODIPine, 10 mg, Oral, Q24H  aspirin, 81 mg, Oral, Daily  carvedilol, 3.125 mg, Oral, BID With Meals  folic acid, 1 mg, Oral, Daily  heparin (porcine), 5,000 Units, Subcutaneous, Q8H  insulin glargine, 3 Units, Subcutaneous, Nightly  insulin lispro, 0-9 Units, Subcutaneous, TID With Meals  lactulose, 10 g, Oral, BID  levETIRAcetam, 250 mg, Oral, BID  levothyroxine, 300 mcg, Oral, Q AM  lidocaine, 1 patch, Transdermal, Q24H  pantoprazole, 40 mg, Oral, Daily  sertraline, 100 mg, Oral, Daily      IV Meds:        Results Reviewed:   I have personally  reviewed the results from the time of this admission to 2/3/2023 08:53 EST     Results from last 7 days   Lab Units 02/03/23  0723 02/01/23  1109 01/28/23  0609   SODIUM mmol/L 132* 133* 131*   POTASSIUM mmol/L 3.8 3.7 3.7   CHLORIDE mmol/L 99 96* 100   CO2 mmol/L 19.9* 26.0 21.4*   BUN mg/dL 14 25* 19   CREATININE mg/dL 2.11* 3.13* 1.99*   CALCIUM mg/dL 8.2* 8.2* 8.1*   BILIRUBIN mg/dL  --  0.6  --    ALK PHOS U/L  --  226*  --    ALT (SGPT) U/L  --  <5  --    AST (SGOT) U/L  --  12  --    GLUCOSE mg/dL 169* 190* 201*       Estimated Creatinine Clearance: 27.4 mL/min (A) (by C-G formula based on SCr of 2.11 mg/dL (H)).    Results from last 7 days   Lab Units 02/03/23  0723 02/01/23  1109 01/28/23  0609   PHOSPHORUS mg/dL 2.0* 2.9 1.3*             Results from last 7 days   Lab Units 02/03/23  0723 02/01/23  1109 01/28/23  0609   WBC 10*3/mm3 12.10* 14.01* 16.87*   HEMOGLOBIN g/dL 10.5* 11.2* 10.9*   PLATELETS 10*3/mm3 264 330 412             Assessment / Plan     ASSESSMENT:  -End-stage renal disease and diabetic and hypertensive glomerulosclerosis on maintenance hemodialysis thrice weekly, she had dialysis yesterday without any difficulty  -Severe weakness prompting admission  -Diabetes mellitus type 2 with renal complete  -Hypertension with chronic kidney disease borderline controlled  -Hypothyroidism on replacement therapy  -Anemia of chronic kidney disease hemoglobin 10.5 with long-acting ANDRAE as an outpatient    PLAN:  -Hemodialysis tomorrow  -Surveillance labS  -Waiting for transfer to skilled nursing facility    Thank you for involving us in the care of Zaina Martinez.  Please feel free to call with any questions.    Lei Covington MD  02/03/23  08:53 EST    [facility]  [phone]    Please note that portions of this note were completed with a voice recognition program.

## 2023-02-03 NOTE — PROGRESS NOTES
Name: Zaina Martinez ADMIT: 2023   : 1965  PCP: Provider, No Known    MRN: [id number] LOS: 1 days   AGE/SEX: 57 y.o. female  ROOM: [facility]     Subjective   Subjective     She complains of anxiety       Objective   Objective   Vital Signs  Temp:  [97.5 °F (36.4 °C)-99 °F (37.2 °C)] 98.6 °F (37 °C)  Heart Rate:  [78-89] 78  Resp:  [16] 16  BP: (156-186)/(72-97) 157/72  SpO2:  [95 %-100 %] 95 %  on  Flow (L/min):  [1-2] 1;   Device (Oxygen Therapy): room air  Body mass index is 23.81 kg/m².  Physical Exam  Constitutional:       General: She is not in acute distress.     Appearance: She is not toxic-appearing.   Cardiovascular:      Rate and Rhythm: Normal rate and regular rhythm.      Heart sounds: Normal heart sounds.   Pulmonary:      Effort: Pulmonary effort is normal. No respiratory distress.      Breath sounds: Normal breath sounds. No wheezing, rhonchi or rales.   Abdominal:      General: Bowel sounds are normal. There is no distension.      Palpations: Abdomen is soft.      Tenderness: There is no abdominal tenderness. There is no guarding or rebound.   Musculoskeletal:         General: No tenderness.      Right lower leg: No edema.      Left lower leg: No edema.   Neurological:      Mental Status: She is alert.   Psychiatric:         Mood and Affect: Mood normal.         Behavior: Behavior normal.         Results Review     I reviewed the patient's new clinical results.  Results from last 7 days   Lab Units 23  0723 23  1109 23  0609   WBC 10*3/mm3 12.10* 14.01* 16.87*   HEMOGLOBIN g/dL 10.5* 11.2* 10.9*   PLATELETS 10*3/mm3 264 330 412     Results from last 7 days   Lab Units 23  0723 23  1109 23  0609   SODIUM mmol/L 132* 133* 131*   POTASSIUM mmol/L 3.8 3.7 3.7   CHLORIDE mmol/L 99 96* 100   CO2 mmol/L 19.9* 26.0 21.4*   BUN mg/dL 14 25* 19   CREATININE mg/dL 2.11* 3.13* 1.99*   GLUCOSE mg/dL 169* 190* 201*   Estimated Creatinine Clearance: 27.4 mL/min (A) (by  C-G formula based on SCr of 2.11 mg/dL (H)).  Results from last 7 days   Lab Units 02/03/23  0723 02/01/23  1109 01/28/23  0609   ALBUMIN g/dL 2.9* 2.9* 2.7*   BILIRUBIN mg/dL  --  0.6  --    ALK PHOS U/L  --  226*  --    AST (SGOT) U/L  --  12  --    ALT (SGPT) U/L  --  <5  --      Results from last 7 days   Lab Units 02/03/23  0723 02/01/23  1109 01/28/23  0609   CALCIUM mg/dL 8.2* 8.2* 8.1*   ALBUMIN g/dL 2.9* 2.9* 2.7*   PHOSPHORUS mg/dL 2.0* 2.9 1.3*     Results from last 7 days   Lab Units 02/01/23  1109   LACTATE mmol/L 0.9     COVID19   Date Value Ref Range Status   02/01/2023 Not Detected Not Detected - Ref. Range Final   12/26/2022 Not Detected Not Detected - Ref. Range Final   11/09/2022 Not Detected Not Detected - Ref. Range Final   10/26/2022 Not Detected Not Detected - Ref. Range Final   09/03/2022 Not Detected Not Detected - Ref. Range Final     Hemoglobin A1C   Date/Time Value Ref Range Status   02/02/2023 0923 6.20 (H) 4.80 - 5.60 % Final     Glucose   Date/Time Value Ref Range Status   02/03/2023 1112 197 (H) 70 - 130 mg/dL Final     Comment:     Meter: [id number] : [id number] Raina SAAVEDRA   02/03/2023 0543 163 (H) 70 - 130 mg/dL Final     Comment:     Meter: [id number] : [id number] Estrada Covarrubias NA   02/02/2023 2025 224 (H) 70 - 130 mg/dL Final     Comment:     Meter: [id number] : [id number] Estrada Covarrubias NA   02/02/2023 1831 119 70 - 130 mg/dL Final     Comment:     Meter: [id number] : [id number] Raina SAAVEDRA   02/02/2023 1121 208 (H) 70 - 130 mg/dL Final     Comment:     Meter: [id number] : [id number] Raina Macedo NA   02/02/2023 0604 81 70 - 130 mg/dL Final     Comment:     Meter: [id number] : [id number] Ernst Rosa NA   02/01/2023 2110 100 70 - 130 mg/dL Final     Comment:     Meter: [id number] : [id number] Isabel Torre RN       XR Chest 1 View  Narrative: XR CHEST 1 VW-     HISTORY: Female who is 57 years-old,  cough     TECHNIQUE: Frontal  view of the chest     COMPARISON: 01/22/2023     FINDINGS: Right-sided central venous catheter appears stable. NG tube  has been removed. Patient is rotated towards the right. The heart  appears mildly enlarged. Pulmonary vasculature unremarkable. Small left  basilar atelectasis/infiltrate/effusion, appears decreased from the  prior exam. No pneumothorax. No acute osseous process.     Impression: Small left basilar atelectasis/infiltrate/effusion, appears  decreased from the prior exam.     This report was finalized on 2/1/2023 11:29 AM by Dr. Jared Kern M.D.       Scheduled Medications  amLODIPine, 10 mg, Oral, Q24H  aspirin, 81 mg, Oral, Daily  carvedilol, 3.125 mg, Oral, BID With Meals  folic acid, 1 mg, Oral, Daily  heparin (porcine), 5,000 Units, Subcutaneous, Q8H  insulin glargine, 3 Units, Subcutaneous, Nightly  insulin lispro, 0-9 Units, Subcutaneous, TID With Meals  lactulose, 10 g, Oral, BID  levETIRAcetam, 250 mg, Oral, BID  levothyroxine, 300 mcg, Oral, Q AM  lidocaine, 1 patch, Transdermal, Q24H  pantoprazole, 40 mg, Oral, Daily  sertraline, 100 mg, Oral, Daily    Infusions   Diet  Diet: Renal Diets; Low Sodium (2-3g), Low Potassium; Texture: Regular Texture (IDDSI 7); Fluid Consistency: Thin (IDDSI 0)       Assessment/Plan     Active Hospital Problems    Diagnosis  POA   • **Generalized weakness [R53.1]  Yes   • Severe malnutrition ([facility]) [E43]  Yes   • Pyuria [R82.81]  Yes   • Chronic systolic CHF (congestive heart failure) ([facility]) [I50.22]  Yes   • History of stroke- R MCA s/p TPA with subsequent ICH with debility [Z86.73]  Not Applicable   • ESRD (end stage renal disease) ([facility]) [N18.6]  Yes   • Hypothyroidism [E03.9]  Yes   • Hypertensive disorder [I10]  Yes   • Type 2 diabetes mellitus with kidney complication, with long-term current use of insulin ([facility]) [E11.29, Z79.4]  Not Applicable      Resolved Hospital Problems   No resolved problems to display.       57 y.o. female admitted with  Generalized weakness.    · Generalized weakness-this is the patient's 18th admission in 13 months. She has refused rehab in the past, but is agreeable this time and her  cannot care for her at home. CCP is working on getting her set up with a nursing facility  · Pyuria-asymptomatic. ID recommends monitoring off abx  · ESRD on HD-nephrology is consulted for routine dialysis  · Chronic systolic heart failure with recovered EF-on coreg. Will start lisinopril. Cannot tolerate SGLT2i with diabetes   · Type 2 diabetes on insulin-a1c 6.2. glucose is adequately controlled. Continue home regimen and sliding scale  · Hypertension-adding lisinopril as above  · Hypothyroidism-synthroid. She did have trouble correcting her TSH despite supplementation for a month during her prior hospitalization and required IV synthroid. Free t4 here is normal  · Rheumatoid arthritis-previously on chronic steroids, but these appear to have been stopped several months ago. AM cortisol was normal  · Paroxysmal afib-not a candidate for AC given recent hemorrhagic shock. On asa and coreg  · Seizure disorder-keppra  · GERD-ppi  · History of CVA  · Recent right femur fracture  · History of left renal hematoma causing life-threatening hemorrhagic shock s/p left renal artery embolization on 1/1/23-no longer a candidate for AC  · Anxiety-prn hydroxyzine   · Heparin SC for DVT prophylaxis.  · Full code.  · Discussed with patient and nursing staff.  · Anticipate discharge to SNU facility once arrangements have been made.      Ronnie Baptiste MD  [facility]  02/03/23  14:18 EST    I wore protective equipment throughout this patient encounter including a face mask, gloves and protective eyewear.  Hand hygiene was performed before donning protective equipment and after removal when leaving the room.

## 2023-02-03 NOTE — PLAN OF CARE
Goal Outcome Evaluation:  Plan of Care Reviewed With: patient           Outcome Evaluation: Pt seen for OT/PT session today due to decreased endurance and activity tolerance. Pt demo improvement with bed mobility requiring mod Ax1. Sat EOB and completed x5 reps BUE ther ex with rest breaks. Performed grooming tasks with CGA.  Required CGA for sitting balance. Dep to don socks. Unable to attempt functional mobility due to increased fatigue this AM. Pt to continue to benefit from skilled OT to address deficits.

## 2023-02-03 NOTE — PLAN OF CARE
Goal Outcome Evaluation:      Patient had continuous diarrhea for the first half of the shift despite having not received Lactulose since admission. Cdiff ordered by RIKA Pa with negative result. Diarrhea has slowed. Patient states she thinks it started with the protein shake she was given at dinner. Fluids encouraged for hydration. Zofran given for nausea. Patient called out later in a tearful state, saying she felt anxious. Atarax ordered and given. Patient currently resting quietly. VSS. 1L NC. Refused heating pad for back pain.      0549: Anxiety unresolved with Atarax. Per Farida Laboy, give Zoloft early.

## 2023-02-03 NOTE — THERAPY TREATMENT NOTE
Patient Name: Zaina Martinez  : 1965    MRN: [id number]                              Today's Date: 2/3/2023       Admit Date: 2023    Visit Dx:     ICD-10-CM ICD-9-CM   1. Pyuria  R82.81 791.9   2. Weakness  R53.1 780.79     Patient Active Problem List   Diagnosis   • Renal insufficiency   • Hypertensive disorder   • Hypothyroidism   • Type 2 diabetes mellitus with kidney complication, with long-term current use of insulin (Former[facility])   • Rheumatoid arthritis ([facility])   • Angioedema   • Esophageal dysmotility   • Anemia   • Medically noncompliant   • Myocardial infarction due to demand ischemia ([facility])   • Enteritis   • PRES (posterior reversible encephalopathy syndrome)   • Urine retention   • Klebsiella infection   • Superficial thrombophlebitis   • Generalized weakness   • ESRD (end stage renal disease) ([facility])   • CAD (coronary artery disease)   • Abnormal urinalysis   • Chronic diastolic CHF (congestive heart failure) ([facility])   • Pyelonephritis   • Calculus of gallbladder with acute on chronic cholecystitis without obstruction   • Pleural effusion on right   • Anemia due to chronic kidney disease, on chronic dialysis ([facility])   • Abnormal findings on diagnostic imaging of other specified body structures   • Acute upper respiratory infection   • Agitation   • Alkaline phosphatase raised   • Casts present in urine   • Cellulitis of toe   • Hip pain   • Community acquired pneumonia   • Depressive disorder   • Diarrhea of presumed infectious origin   • Difficult or painful urination   • Disease due to severe acute respiratory syndrome coronavirus 2 (SARS-CoV-2)   • Dyspnea   • Encounter for follow-up examination after completed treatment for conditions other than malignant neoplasm   • H/O: hypothyroidism   • Hyperlipidemia   • Hypomagnesemia   • Intractable vomiting with nausea   • Luetscher's syndrome   • Need for influenza vaccination   • Restless legs   • Noncompliance with treatment   • Shoulder pain   • Acute UTI  (urinary tract infection)   • Metabolic encephalopathy   • Abnormal findings on diagnostic imaging of abdomen   • Status post cholecystectomy   • Hyponatremia   • Acute metabolic encephalopathy   • Encephalopathy, toxic   • Acute CVA (cerebrovascular accident) (HCC)   • History of intracranial hemorrhage   • Stroke (HCC)   • Abnormal urinalysis   • Diabetic muscle infarction (HCC)   • Other insomnia   • Altered mental status   • History of stroke- R MCA s/p TPA with subsequent ICH with debility   • Heart murmur   • Bradycardia   • Left lower lobe pneumonia   • Metabolic encephalopathy   • Pericardial effusion   • Pleural effusion   • Acute pulmonary edema (HCC)   • Atrial flutter (HCC)   • Chronic systolic CHF (congestive heart failure) (HCC)   • Thrombus of venous dialysis catheter (HCC)   • Sepsis without acute organ dysfunction (HCC)   • Type 2 diabetes mellitus with hyperglycemia, with long-term current use of insulin (HCC)   • Acute respiratory failure with hypoxia (HCC)   • Acute on chronic systolic CHF (congestive heart failure) (HCC)   • Hyperkalemia   • Acute respiratory failure with hypoxia (HCC)   • Other hypervolemia   • Hematoma of right hip, initial encounter   • Ureteral stent present   • Closed intertrochanteric fracture of right femur (HCC)   • Witnessed seizure-like activity (HCC)   • Acute respiratory failure with hypercapnia (HCC)   • Opioid use   • Diabetic muscle infarction (HCC)   • Acute posthemorrhagic anemia   • Kidney hematoma   • Pyuria   • Severe malnutrition (HCC)     Past Medical History:   Diagnosis Date   • Acute CVA (cerebrovascular accident) (HCC) 05/01/2022   • Acute on chronic diastolic CHF (congestive heart failure) (HCC)    • Anemia    • CAD (coronary artery disease) 12/20/2021   • Diabetes (HCC)    • Disease of thyroid gland    • GERD (gastroesophageal reflux disease)    • History of intracranial hemorrhage 5/1/2022   • Hyperlipidemia 11/30/2018   • Hypertension    •  Rheumatoid arthritis (HCC)    • Stage 5 chronic kidney disease (HCC)      Past Surgical History:   Procedure Laterality Date   • CHOLECYSTECTOMY WITH INTRAOPERATIVE CHOLANGIOGRAM N/A 1/10/2022    Procedure: Laparoscopic cholecystectomy with intraoperative cholangiogram;  Surgeon: Ramana Raygoza MD;  Location: [facility];  Service: General;  Laterality: N/A;   • CYSTOSCOPY W/ URETERAL STENT PLACEMENT Left 9/29/2022    Procedure: CYSTOSCOPY URETERAL STENT INSERTION WITH RETROGRADE PYELOGRAM;  Surgeon: Bryant Phan Jr., MD;  Location: [facility] OR;  Service: Urology;  Laterality: Left;   • CYSTOSCOPY W/ URETERAL STENT PLACEMENT Left 1/10/2023    Procedure: CYSTOSCOPY LEFT STENT REMOVAL;  Surgeon: Bryant Phan Jr., MD;  Location: [facility] OR;  Service: Urology;  Laterality: Left;   • EMBOLIZATION MESENTERIC ARTERY N/A 1/1/2023    Procedure: LEFT KIDNEY EMBOLIZATION;  Surgeon: Bijan Ordoñez MD;  Location: [facility] OR 18/19;  Service: Interventional Radiology;  Laterality: N/A;   • EYE SURGERY     • FEMUR IM NAILING/RODDING Right 11/10/2022    Procedure: FEMUR INTRAMEDULLARY NAILING/RODDING;  Surgeon: Glen Pierce MD;  Location: [facility] OR;  Service: Orthopedics;  Laterality: Right;   • HIP INTERTROCHANTERIC NAILING Right 11/10/2022    Procedure: HIP INTERTROCHANTERIC NAILING;  Surgeon: Glen Pierce MD;  Location: [facility] OR;  Service: Orthopedics;  Laterality: Right;   • HYSTERECTOMY     • INSERT CENTRAL LINE AT BEDSIDE  12/31/2022        • INSERTION HEMODIALYSIS CATHETER N/A 12/6/2021    Procedure: HEMODIALYSIS CATHETER INSERTION;  Surgeon: Keli Salazar MD;  Location: [facility] OR 18/19;  Service: Vascular;  Laterality: N/A;   • INSERTION HEMODIALYSIS CATHETER N/A 5/3/2022    Procedure: TUNNELED CATHETER PLACEMENT;  Surgeon: Keli Salazar MD;  Location: [facility] OR;  Service: Vascular;  Laterality: N/A;   • MUSCLE BIOPSY Left  6/15/2022    Procedure: Left quadriceps muscle biopsy;  Surgeon: Marques Ma MD;  Location: [facility];  Service: Neurosurgery;  Laterality: Left;   • URETEROSCOPY LASER LITHOTRIPSY WITH STENT INSERTION Left 12/30/2022    Procedure: CYSTOSCOPY WITH LEFT  URETEROSCOPY  LEFT STENT EXCHANGE;  Surgeon: Bryant Phan Jr., MD;  Location: [facility];  Service: Urology;  Laterality: Left;      General Information     Row Name 02/03/23 1125          Physical Therapy Time and Intention    Document Type therapy note (daily note)  -MD     Mode of Treatment physical therapy  -MD     Row Name 02/03/23 1125          General Information    Patient Profile Reviewed yes  -MD     Existing Precautions/Restrictions fall  -MD     Row Name 02/03/23 1125          Cognition    Orientation Status (Cognition) oriented to;person  -MD           User Key  (r) = Recorded By, (t) = Taken By, (c) = Cosigned By    Initials Name Provider Type    Jeannette Peoples, PT Physical Therapist               Mobility     Row Name 02/03/23 1126          Bed Mobility    Supine-Sit Barber (Bed Mobility) verbal cues;moderate assist (50% patient effort)  -MD     Sit-Supine Barber (Bed Mobility) verbal cues;moderate assist (50% patient effort)  -MD     Assistive Device (Bed Mobility) head of bed elevated;draw sheet;bed rails  -MD     Row Name 02/03/23 1126          Transfers    Comment, (Transfers) standing deferred this date due to fatigue.  Pt states she didn't sleep at all last night but agreeable to sitting EOB  -MD           User Key  (r) = Recorded By, (t) = Taken By, (c) = Cosigned By    Initials Name Provider Type    Jeannette Peoples, PT Physical Therapist               Obj/Interventions     Row Name 02/03/23 1127          Motor Skills    Therapeutic Exercise --  seated ther ex x5 reps AP and LAQ  -MD     Row Name 02/03/23 1127          Balance    Static Sitting Balance verbal cues;contact guard  -MD     Dynamic Sitting Balance verbal  cues;contact guard;minimal assist  -MD     Position, Sitting Balance sitting edge of bed  -MD           User Key  (r) = Recorded By, (t) = Taken By, (c) = Cosigned By    Initials Name Provider Type    Jeannette Peoples, PT Physical Therapist               Goals/Plan    No documentation.                Clinical Impression     Row Name 02/03/23 1128          Pain    Pretreatment Pain Rating 0/10 - no pain  -MD     Row Name 02/03/23 1128          Plan of Care Review    Outcome Evaluation Pt progressing w sitting balance and bed mobility as demonstrated by requiring less assistance for bed mobility and sitting balance.  Pt was able to tolerate seated ther ex but was unable to progress w standing activites due to fatigue.  PT continues to recommend SNU upon d/c.  -MD     Row Name 02/03/23 1128          Positioning and Restraints    Pre-Treatment Position in bed  -MD     Post Treatment Position bed  -MD     In Bed supine;call light within reach;exit alarm on  -MD           User Key  (r) = Recorded By, (t) = Taken By, (c) = Cosigned By    Initials Name Provider Type    Jeannette Peoples, PT Physical Therapist               Outcome Measures     Row Name 02/03/23 1130          How much help from another person do you currently need...    Turning from your back to your side while in flat bed without using bedrails? 2  -MD     Moving from lying on back to sitting on the side of a flat bed without bedrails? 2  -MD     Moving to and from a bed to a chair (including a wheelchair)? 1  -MD     Standing up from a chair using your arms (e.g., wheelchair, bedside chair)? 1  -MD     Climbing 3-5 steps with a railing? 1  -MD     To walk in hospital room? 1  -MD     AM-PAC 6 Clicks Score (PT) 8  -MD     Highest level of mobility 3 --> Sat at edge of bed  -MD     Row Name 02/03/23 1130          Functional Assessment    Outcome Measure Options AM-PAC 6 Clicks Basic Mobility (PT)  -MD           User Key  (r) = Recorded By, (t) = Taken By, (c) =  Cosigned By    Initials Name Provider Type    Jeannette Peoples, PT Physical Therapist                             Physical Therapy Education     Title: PT OT SLP Therapies (In Progress)     Topic: Physical Therapy (Done)     Point: Mobility training (Done)     Learning Progress Summary           Patient Acceptance, E,D, DU by PC at 2/2/2023 1409                   Point: Home exercise program (Done)     Learning Progress Summary           Patient Acceptance, E,D, DU by PC at 2/2/2023 1409                   Point: Body mechanics (Done)     Learning Progress Summary           Patient Acceptance, E,D, DU by PC at 2/2/2023 1409                   Point: Precautions (Done)     Learning Progress Summary           Patient Acceptance, E, VU by MD at 2/3/2023 1130    Acceptance, E,D, DU by PC at 2/2/2023 1409                               User Key     Initials Effective Dates Name Provider Type Discipline     06/16/21 -  Cristela Perez PT Physical Therapist PT    MD 06/16/21 -  Jeannette Easton PT Physical Therapist PT              PT Recommendation and Plan     Outcome Evaluation: Pt progressing w sitting balance and bed mobility as demonstrated by requiring less assistance for bed mobility and sitting balance.  Pt was able to tolerate seated ther ex but was unable to progress w standing activites due to fatigue.  PT continues to recommend SNU upon d/c.     Time Calculation:    PT Charges     Row Name 02/03/23 1124             Time Calculation    Start Time 1110  -MD      Stop Time 1123  -MD      Time Calculation (min) 13 min  -MD      PT Received On 02/03/23  -MD            User Key  (r) = Recorded By, (t) = Taken By, (c) = Cosigned By    Initials Name Provider Type    Jeannette Peoples PT Physical Therapist              Therapy Charges for Today     Code Description Service Date Service Provider Modifiers Qty    92630529457 HC PT THERAPEUTIC ACT EA 15 MIN 2/3/2023 Jeannette Easton, PT GP 1    35876686039 HC PT THER SUPP EA 15 MIN  2/3/2023 Jeannette Easton, PT GP 1        Patient was not wearing a face mask during this therapy encounter. Therapist used appropriate personal protective equipment including mask and gloves.  Mask used was standard procedure mask. Appropriate PPE was worn during the entire therapy session. Hand hygiene was completed before and after therapy session. Patient is not in enhanced droplet precautions.       PT G-Codes  Outcome Measure Options: AM-PAC 6 Clicks Basic Mobility (PT)  AM-PAC 6 Clicks Score (PT): 8  AM-PAC 6 Clicks Score (OT): 9  PT Discharge Summary  Anticipated Discharge Disposition (PT): skilled nursing facility    Jeannette Easton PT  2/3/2023

## 2023-02-03 NOTE — CASE MANAGEMENT/SOCIAL WORK
Continued Stay Note  [facility]     Patient Name: Zaina Martinez  MRN: [id number]  Today's Date: 2/3/2023    Admit Date: 2/1/2023    Plan: SNF with [facility]- [facility] pending pre-cert- spouse to tour today   Discharge Plan     Row Name 02/03/23 1352       Plan    Plan SNF with [facility]- [facility] accepted pending pre-cert- spouse to tour today    Plan Comments Spouse to tour [facility] 2/3 @ 1730.  Patient will need [facility] CMR pre-cert.  Packet in cubbie.  CCP to follow up on Monday. Helena DIAZ RN    Row Name 02/03/23 1320       Plan    Plan Plan to SNF with HD. Plan for w/c van at discharge    Patient/Family in Agreement with Plan yes    Plan Comments [facility] with [facility]  [facility] called family, arranged tour on 2/3 at 1730.               Discharge Codes    No documentation.               Expected Discharge Date and Time     Expected Discharge Date Expected Discharge Time    Feb 6, 2023             Helena Cavanaugh, RN

## 2023-02-03 NOTE — THERAPY TREATMENT NOTE
Patient Name: Zaina Martinez  : 1965    MRN: [id number]                              Today's Date: 2/3/2023       Admit Date: 2023    Visit Dx:     ICD-10-CM ICD-9-CM   1. Pyuria  R82.81 791.9   2. Weakness  R53.1 780.79     Patient Active Problem List   Diagnosis   • Renal insufficiency   • Hypertensive disorder   • Hypothyroidism   • Type 2 diabetes mellitus with kidney complication, with long-term current use of insulin ([facility])   • Rheumatoid arthritis ([facility])   • Angioedema   • Esophageal dysmotility   • Anemia   • Medically noncompliant   • Myocardial infarction due to demand ischemia ([facility])   • Enteritis   • PRES (posterior reversible encephalopathy syndrome)   • Urine retention   • Klebsiella infection   • Superficial thrombophlebitis   • Generalized weakness   • ESRD (end stage renal disease) ([facility])   • CAD (coronary artery disease)   • Abnormal urinalysis   • Chronic diastolic CHF (congestive heart failure) ([facility])   • Pyelonephritis   • Calculus of gallbladder with acute on chronic cholecystitis without obstruction   • Pleural effusion on right   • Anemia due to chronic kidney disease, on chronic dialysis ([facility])   • Abnormal findings on diagnostic imaging of other specified body structures   • Acute upper respiratory infection   • Agitation   • Alkaline phosphatase raised   • Casts present in urine   • Cellulitis of toe   • Hip pain   • Community acquired pneumonia   • Depressive disorder   • Diarrhea of presumed infectious origin   • Difficult or painful urination   • Disease due to severe acute respiratory syndrome coronavirus 2 (SARS-CoV-2)   • Dyspnea   • Encounter for follow-up examination after completed treatment for conditions other than malignant neoplasm   • H/O: hypothyroidism   • Hyperlipidemia   • Hypomagnesemia   • Intractable vomiting with nausea   • Luetscher's syndrome   • Need for influenza vaccination   • Restless legs   • Noncompliance with treatment   • Shoulder pain   • Acute UTI  (urinary tract infection)   • Metabolic encephalopathy   • Abnormal findings on diagnostic imaging of abdomen   • Status post cholecystectomy   • Hyponatremia   • Acute metabolic encephalopathy   • Encephalopathy, toxic   • Acute CVA (cerebrovascular accident) (HCC)   • History of intracranial hemorrhage   • Stroke (HCC)   • Abnormal urinalysis   • Diabetic muscle infarction (HCC)   • Other insomnia   • Altered mental status   • History of stroke- R MCA s/p TPA with subsequent ICH with debility   • Heart murmur   • Bradycardia   • Left lower lobe pneumonia   • Metabolic encephalopathy   • Pericardial effusion   • Pleural effusion   • Acute pulmonary edema (HCC)   • Atrial flutter (HCC)   • Chronic systolic CHF (congestive heart failure) (HCC)   • Thrombus of venous dialysis catheter (HCC)   • Sepsis without acute organ dysfunction (HCC)   • Type 2 diabetes mellitus with hyperglycemia, with long-term current use of insulin (HCC)   • Acute respiratory failure with hypoxia (HCC)   • Acute on chronic systolic CHF (congestive heart failure) (HCC)   • Hyperkalemia   • Acute respiratory failure with hypoxia (HCC)   • Other hypervolemia   • Hematoma of right hip, initial encounter   • Ureteral stent present   • Closed intertrochanteric fracture of right femur (HCC)   • Witnessed seizure-like activity (HCC)   • Acute respiratory failure with hypercapnia (HCC)   • Opioid use   • Diabetic muscle infarction (HCC)   • Acute posthemorrhagic anemia   • Kidney hematoma   • Pyuria   • Severe malnutrition (HCC)     Past Medical History:   Diagnosis Date   • Acute CVA (cerebrovascular accident) (HCC) 05/01/2022   • Acute on chronic diastolic CHF (congestive heart failure) (HCC)    • Anemia    • CAD (coronary artery disease) 12/20/2021   • Diabetes (HCC)    • Disease of thyroid gland    • GERD (gastroesophageal reflux disease)    • History of intracranial hemorrhage 5/1/2022   • Hyperlipidemia 11/30/2018   • Hypertension    •  Rheumatoid arthritis (HCC)    • Stage 5 chronic kidney disease (HCC)      Past Surgical History:   Procedure Laterality Date   • CHOLECYSTECTOMY WITH INTRAOPERATIVE CHOLANGIOGRAM N/A 1/10/2022    Procedure: Laparoscopic cholecystectomy with intraoperative cholangiogram;  Surgeon: Ramana Raygoza MD;  Location: [facility]  Service: General;  Laterality: N/A;   • CYSTOSCOPY W/ URETERAL STENT PLACEMENT Left 9/29/2022    Procedure: CYSTOSCOPY URETERAL STENT INSERTION WITH RETROGRADE PYELOGRAM;  Surgeon: Bryant Phan Jr., MD;  Location: [facility] OR;  Service: Urology;  Laterality: Left;   • CYSTOSCOPY W/ URETERAL STENT PLACEMENT Left 1/10/2023    Procedure: CYSTOSCOPY LEFT STENT REMOVAL;  Surgeon: Bryant Phan Jr., MD;  Location: [facility] OR;  Service: Urology;  Laterality: Left;   • EMBOLIZATION MESENTERIC ARTERY N/A 1/1/2023    Procedure: LEFT KIDNEY EMBOLIZATION;  Surgeon: Bijan Ordoñez MD;  Location: [facility] OR 18/19;  Service: Interventional Radiology;  Laterality: N/A;   • EYE SURGERY     • FEMUR IM NAILING/RODDING Right 11/10/2022    Procedure: FEMUR INTRAMEDULLARY NAILING/RODDING;  Surgeon: Glen Pierce MD;  Location: [facility] OR;  Service: Orthopedics;  Laterality: Right;   • HIP INTERTROCHANTERIC NAILING Right 11/10/2022    Procedure: HIP INTERTROCHANTERIC NAILING;  Surgeon: Glen Pierce MD;  Location: [facility] OR;  Service: Orthopedics;  Laterality: Right;   • HYSTERECTOMY     • INSERT CENTRAL LINE AT BEDSIDE  12/31/2022        • INSERTION HEMODIALYSIS CATHETER N/A 12/6/2021    Procedure: HEMODIALYSIS CATHETER INSERTION;  Surgeon: Keli Salazar MD;  Location: [facility] OR 18/19;  Service: Vascular;  Laterality: N/A;   • INSERTION HEMODIALYSIS CATHETER N/A 5/3/2022    Procedure: TUNNELED CATHETER PLACEMENT;  Surgeon: Keli Salazar MD;  Location: [facility] OR;  Service: Vascular;  Laterality: N/A;   • MUSCLE BIOPSY Left  6/15/2022    Procedure: Left quadriceps muscle biopsy;  Surgeon: Marques Ma MD;  Location: [facility];  Service: Neurosurgery;  Laterality: Left;   • URETEROSCOPY LASER LITHOTRIPSY WITH STENT INSERTION Left 12/30/2022    Procedure: CYSTOSCOPY WITH LEFT  URETEROSCOPY  LEFT STENT EXCHANGE;  Surgeon: Bryant Phan Jr., MD;  Location: [facility];  Service: Urology;  Laterality: Left;      General Information     Row Name 02/03/23 1200          OT Time and Intention    Document Type therapy note (daily note)  -     Mode of Treatment occupational therapy;physical therapy;co-treatment  due to decreased endurance and activity tolerance  -     Row Name 02/03/23 1200          General Information    Patient Profile Reviewed yes  -     Existing Precautions/Restrictions fall  -     Row Name 02/03/23 1200          Cognition    Orientation Status (Cognition) oriented to;person  -     Row Name 02/03/23 1200          Safety Issues, Functional Mobility    Impairments Affecting Function (Mobility) endurance/activity tolerance;strength  -           User Key  (r) = Recorded By, (t) = Taken By, (c) = Cosigned By    Initials Name Provider Type     Erica Moya, OT Occupational Therapist                 Mobility/ADL's     Row Name 02/03/23 1201          Bed Mobility    Supine-Sit [place] (Bed Mobility) verbal cues;moderate assist (50% patient effort)  -     Sit-Supine [place] (Bed Mobility) verbal cues;moderate assist (50% patient effort)  -     Assistive Device (Bed Mobility) head of bed elevated;draw sheet;bed rails  -     Row Name 02/03/23 1201          Transfers    Comment, (Transfers) Did not attempt due to pt's increased fatigue today. Pt reports she didn't sleep at all last night  -     Row Name 02/03/23 1201          Activities of Daily Living    BADL Assessment/Intervention grooming  politely declined ADLs due to fatigue  -     Row Name 02/03/23 1201          Lower Body  Dressing Assessment/Training    [place] Level (Lower Body Dressing) don;socks;dependent (less than 25% patient effort)  -     Position (Lower Body Dressing) edge of bed sitting  -     Row Name 02/03/23 1201          Grooming Assessment/Training    [place] Level (Grooming) grooming skills;hair care, combing/brushing;contact guard assist  -     Position (Grooming) edge of bed sitting  -           User Key  (r) = Recorded By, (t) = Taken By, (c) = Cosigned By    Initials Name Provider Type    Erica Sales OT Occupational Therapist               Obj/Interventions     Row Name 02/03/23 1206          Shoulder (Therapeutic Exercise)    Shoulder (Therapeutic Exercise) AROM (active range of motion)  -     Shoulder AROM (Therapeutic Exercise) left;right;flexion;extension;5 repetitions;sitting  -     Row Name 02/03/23 1206          Motor Skills    Therapeutic Exercise shoulder  -[facility] Name 02/03/23 1206          Balance    Static Sitting Balance contact guard;verbal cues  -     Dynamic Sitting Balance contact guard;minimal assist;verbal cues  -     Position, Sitting Balance sitting edge of bed  -     Balance Interventions sitting;occupation based/functional task  -           User Key  (r) = Recorded By, (t) = Taken By, (c) = Cosigned By    Initials Name Provider Type    Erica Sales OT Occupational Therapist               Goals/Plan    No documentation.                Clinical Impression     Row Name 02/03/23 1206          Pain Assessment    Pretreatment Pain Rating 0/10 - no pain  -     Posttreatment Pain Rating 0/10 - no pain  -     Row Name 02/03/23 1206          Plan of Care Review    Plan of Care Reviewed With patient  -     Outcome Evaluation Pt seen for OT/PT session today due to decreased endurance and activity tolerance. Pt demo improvement with bed mobility requiring mod Ax1. Sat EOB and completed x5 reps BUE ther ex with rest breaks. Performed grooming tasks  with CGA.  Required CGA for sitting balance. Dep to don socks. Unable to attempt functional mobility due to increased fatigue this AM. Pt to continue to benefit from skilled OT to address deficits.  -     Row Name 02/03/23 1206          Vital Signs    Pre Patient Position Supine  -KA     Intra Patient Position Sitting  -KA     Post Patient Position Supine  -KA     Row Name 02/03/23 1206          Positioning and Restraints    Pre-Treatment Position in bed  -KA     Post Treatment Position bed  -KA     In Bed supine;call light within reach;encouraged to call for assist;exit alarm on  -KA           User Key  (r) = Recorded By, (t) = Taken By, (c) = Cosigned By    Initials Name Provider Type    Erica Sales, OT Occupational Therapist               Outcome Measures     Row Name 02/03/23 1209          How much help from another is currently needed...    Putting on and taking off regular lower body clothing? 1  -KA     Bathing (including washing, rinsing, and drying) 1  -KA     Toileting (which includes using toilet bed pan or urinal) 1  -KA     Putting on and taking off regular upper body clothing 2  -KA     Taking care of personal grooming (such as brushing teeth) 3  -KA     Eating meals 3  -KA     AM-PAC 6 Clicks Score (OT) 11  -KA     Row Name 02/03/23 1130          How much help from another person do you currently need...    Turning from your back to your side while in flat bed without using bedrails? 2  -MD     Moving from lying on back to sitting on the side of a flat bed without bedrails? 2  -MD     Moving to and from a bed to a chair (including a wheelchair)? 1  -MD     Standing up from a chair using your arms (e.g., wheelchair, bedside chair)? 1  -MD     Climbing 3-5 steps with a railing? 1  -MD     To walk in hospital room? 1  -MD     AM-PAC 6 Clicks Score (PT) 8  -MD     Highest level of mobility 3 --> Sat at edge of bed  -MD     Row Name 02/03/23 1209 02/03/23 1130       Functional Assessment     Outcome Measure Options AM-PAC 6 Clicks Daily Activity (OT)  -MICKEY AM-PAC 6 Clicks Basic Mobility (PT)  -MD          User Key  (r) = Recorded By, (t) = Taken By, (c) = Cosigned By    Initials Name Provider Type    Jeannette Peoples, PT Physical Therapist    Erica Sales, OT Occupational Therapist                Occupational Therapy Education     Title: PT OT SLP Therapies (In Progress)     Topic: Occupational Therapy (In Progress)     Point: ADL training (Done)     Description:   Instruct learner(s) on proper safety adaptation and remediation techniques during self care or transfers.   Instruct in proper use of assistive devices.              Learning Progress Summary           Patient Acceptance, E, VU by BS at 2/2/2023 1420    Comment: Reason for eval and consult                   Point: Home exercise program (Not Started)     Description:   Instruct learner(s) on appropriate technique for monitoring, assisting and/or progressing therapeutic exercises/activities.              Learner Progress:  Not documented in this visit.          Point: Precautions (Done)     Description:   Instruct learner(s) on prescribed precautions during self-care and functional transfers.              Learning Progress Summary           Patient Acceptance, E, VU by BS at 2/2/2023 1420    Comment: Reason for eval and consult                   Point: Body mechanics (Done)     Description:   Instruct learner(s) on proper positioning and spine alignment during self-care, functional mobility activities and/or exercises.              Learning Progress Summary           Patient Acceptance, E, VU by BS at 2/2/2023 1420    Comment: Reason for eval and consult                               User Key     Initials Effective Dates Name Provider Type Discipline     11/14/22 -  Neeta Ordoñez, OT Occupational Therapist OT              OT Recommendation and Plan     Plan of Care Review  Plan of Care Reviewed With: patient  Outcome Evaluation: Pt seen  for OT/PT session today due to decreased endurance and activity tolerance. Pt demo improvement with bed mobility requiring mod Ax1. Sat EOB and completed x5 reps BUE ther ex with rest breaks. Performed grooming tasks with CGA.  Required CGA for sitting balance. Dep to don socks. Unable to attempt functional mobility due to increased fatigue this AM. Pt to continue to benefit from skilled OT to address deficits.     Time Calculation:    Time Calculation- OT     Row Name 02/03/23 1209             Time Calculation- OT    OT Start Time 1110  -KA      OT Stop Time 1123  -KA      OT Time Calculation (min) 13 min  -KA      Total Timed Code Minutes- OT 13 minute(s)  -KA      OT Received On 02/03/23  -KA      OT - Next Appointment 02/06/23  -KA         Timed Charges    46463 - OT Therapeutic Activity Minutes 13  -KA         Total Minutes    Timed Charges Total Minutes 13  -KA       Total Minutes 13  -KA            User Key  (r) = Recorded By, (t) = Taken By, (c) = Cosigned By    Initials Name Provider Type    Erica Sales OT Occupational Therapist              Therapy Charges for Today     Code Description Service Date Service Provider Modifiers Qty    [id number]  OT THERAPEUTIC ACT EA 15 MIN 2/3/2023 Erica Moya OT GO 1               Erica Moya OT  2/3/2023

## 2023-02-03 NOTE — PROGRESS NOTES
Chart reviewed: Pt has been afebrile with negative cultures and c diff. Lower index of suspicion of infection but we will be happy to reevaluate formally should infectious concerns evolve.

## 2023-02-03 NOTE — CONSULTS
"Purpose of the visit was to evaluate for: goals of care/advanced care planning and support for patient/family. Spoke with MD, RN and CCP as well as patient and discussed goals of care, care options and resuscitation status.      Assessment:  Patient is palliative care appropriate given (list diagnosis/symptoms):  History of multiple admissions, some very lengthy over the last year (over 15 admissions in 12 months). History of CVA, CHF, and ESRD requiring HD. Patient admitted with weakness. Patient alert and oriented upon assessment, no complaints of pain or dyspnea. PPS 50%      Recommendations/Plan: No change to current plan of care, goal remains rehab placement and continue hemodialysis. Spiritual care consult per patient request.     Other Comments: Spoke with patient regarding goals of care. Patient has very limited insight into medical condition. Patient tearfully voiced her fear of going to rehab and stated her biggest worry is not being taken care of or family \"forgetting about me\". Psychosocial support provided, reframing current situation encouraged and discussed. Briefly discussed quality of life over quantity of life and cessation of hemodialysis. Patient unable to discuss goals of care any further.   "

## 2023-02-03 NOTE — CASE MANAGEMENT/SOCIAL WORK
Continued Stay Note  [facility]     Patient Name: Zaina Martinez  MRN: [id number]  Today's Date: 2/3/2023    Admit Date: 2/1/2023    Plan: Plan to SNF with HD. Plan for w/c van at discharge   Discharge Plan     Row Name 02/03/23 1320       Plan    Plan Plan to SNF with HD. Plan for w/c van at discharge    Patient/Family in Agreement with Plan yes    Plan Comments Gina with [facility] called family, arranged tour on 2/3 at 1730.               Discharge Codes    No documentation.               Expected Discharge Date and Time     Expected Discharge Date Expected Discharge Time    Feb 3, 2023             Ania Kay RN

## 2023-02-03 NOTE — PLAN OF CARE
Goal Outcome Evaluation:              Outcome Evaluation: Pt progressing w sitting balance and bed mobility as demonstrated by requiring less assistance for bed mobility and sitting balance.  Pt was able to tolerate seated ther ex but was unable to progress w standing activites due to fatigue.  PT continues to recommend SNU upon d/c.

## 2023-02-04 LAB
ALBUMIN SERPL-MCNC: 3 G/DL (ref 3.5–5.2)
ANION GAP SERPL CALCULATED.3IONS-SCNC: 12 MMOL/L (ref 5–15)
BASOPHILS # BLD AUTO: 0.05 10*3/MM3 (ref 0–0.2)
BASOPHILS NFR BLD AUTO: 0.4 % (ref 0–1.5)
BUN SERPL-MCNC: 21 MG/DL (ref 6–20)
BUN/CREAT SERPL: 8.1 (ref 7–25)
CALCIUM SPEC-SCNC: 8.2 MG/DL (ref 8.6–10.5)
CHLORIDE SERPL-SCNC: 95 MMOL/L (ref 98–107)
CO2 SERPL-SCNC: 21 MMOL/L (ref 22–29)
CREAT SERPL-MCNC: 2.59 MG/DL (ref 0.57–1)
DEPRECATED RDW RBC AUTO: 46.9 FL (ref 37–54)
EGFRCR SERPLBLD CKD-EPI 2021: 21 ML/MIN/1.73
EOSINOPHIL # BLD AUTO: 0.02 10*3/MM3 (ref 0–0.4)
EOSINOPHIL NFR BLD AUTO: 0.2 % (ref 0.3–6.2)
ERYTHROCYTE [DISTWIDTH] IN BLOOD BY AUTOMATED COUNT: 15.9 % (ref 12.3–15.4)
GLUCOSE BLDC GLUCOMTR-MCNC: 116 MG/DL (ref 70–130)
GLUCOSE BLDC GLUCOMTR-MCNC: 145 MG/DL (ref 70–130)
GLUCOSE BLDC GLUCOMTR-MCNC: 98 MG/DL (ref 70–130)
GLUCOSE SERPL-MCNC: 150 MG/DL (ref 65–99)
HBV SURFACE AB SER RIA-ACNC: NORMAL
HCT VFR BLD AUTO: 31.2 % (ref 34–46.6)
HGB BLD-MCNC: 9.7 G/DL (ref 12–15.9)
IMM GRANULOCYTES # BLD AUTO: 0.09 10*3/MM3 (ref 0–0.05)
IMM GRANULOCYTES NFR BLD AUTO: 0.8 % (ref 0–0.5)
LYMPHOCYTES # BLD AUTO: 0.87 10*3/MM3 (ref 0.7–3.1)
LYMPHOCYTES NFR BLD AUTO: 7.4 % (ref 19.6–45.3)
MCH RBC QN AUTO: 25.7 PG (ref 26.6–33)
MCHC RBC AUTO-ENTMCNC: 31.1 G/DL (ref 31.5–35.7)
MCV RBC AUTO: 82.8 FL (ref 79–97)
MONOCYTES # BLD AUTO: 1.31 10*3/MM3 (ref 0.1–0.9)
MONOCYTES NFR BLD AUTO: 11.1 % (ref 5–12)
NEUTROPHILS NFR BLD AUTO: 80.1 % (ref 42.7–76)
NEUTROPHILS NFR BLD AUTO: 9.47 10*3/MM3 (ref 1.7–7)
NRBC BLD AUTO-RTO: 0 /100 WBC (ref 0–0.2)
PHOSPHATE SERPL-MCNC: 2 MG/DL (ref 2.5–4.5)
PLATELET # BLD AUTO: 275 10*3/MM3 (ref 140–450)
PMV BLD AUTO: 10 FL (ref 6–12)
POTASSIUM SERPL-SCNC: 4.2 MMOL/L (ref 3.5–5.2)
RBC # BLD AUTO: 3.77 10*6/MM3 (ref 3.77–5.28)
SODIUM SERPL-SCNC: 128 MMOL/L (ref 136–145)
WBC NRBC COR # BLD: 11.81 10*3/MM3 (ref 3.4–10.8)

## 2023-02-04 PROCEDURE — 90791 PSYCH DIAGNOSTIC EVALUATION: CPT

## 2023-02-04 PROCEDURE — 85025 COMPLETE CBC W/AUTO DIFF WBC: CPT | Performed by: INTERNAL MEDICINE

## 2023-02-04 PROCEDURE — 25010000002 ONDANSETRON PER 1 MG: Performed by: NURSE PRACTITIONER

## 2023-02-04 PROCEDURE — G0378 HOSPITAL OBSERVATION PER HR: HCPCS

## 2023-02-04 PROCEDURE — 25010000002 HEPARIN (PORCINE) PER 1000 UNITS: Performed by: STUDENT IN AN ORGANIZED HEALTH CARE EDUCATION/TRAINING PROGRAM

## 2023-02-04 PROCEDURE — G0257 UNSCHED DIALYSIS ESRD PT HOS: HCPCS

## 2023-02-04 PROCEDURE — 86706 HEP B SURFACE ANTIBODY: CPT | Performed by: INTERNAL MEDICINE

## 2023-02-04 PROCEDURE — 80069 RENAL FUNCTION PANEL: CPT | Performed by: INTERNAL MEDICINE

## 2023-02-04 PROCEDURE — 82962 GLUCOSE BLOOD TEST: CPT

## 2023-02-04 PROCEDURE — 96376 TX/PRO/DX INJ SAME DRUG ADON: CPT

## 2023-02-04 PROCEDURE — 96372 THER/PROPH/DIAG INJ SC/IM: CPT

## 2023-02-04 RX ORDER — MANNITOL 250 MG/ML
12.5 INJECTION, SOLUTION INTRAVENOUS AS NEEDED
Status: DISCONTINUED | OUTPATIENT
Start: 2023-02-04 | End: 2023-02-04 | Stop reason: SDUPTHER

## 2023-02-04 RX ORDER — MANNITOL 250 MG/ML
12.5 INJECTION, SOLUTION INTRAVENOUS AS NEEDED
Status: ACTIVE | OUTPATIENT
Start: 2023-02-04 | End: 2023-02-04

## 2023-02-04 RX ORDER — LISINOPRIL 10 MG/1
10 TABLET ORAL NIGHTLY
Status: DISCONTINUED | OUTPATIENT
Start: 2023-02-04 | End: 2023-02-04

## 2023-02-04 RX ORDER — LISINOPRIL 20 MG/1
20 TABLET ORAL NIGHTLY
Status: DISCONTINUED | OUTPATIENT
Start: 2023-02-04 | End: 2023-02-04

## 2023-02-04 RX ORDER — LORAZEPAM 0.5 MG/1
0.5 TABLET ORAL EVERY 6 HOURS PRN
Status: DISPENSED | OUTPATIENT
Start: 2023-02-04 | End: 2023-02-11

## 2023-02-04 RX ORDER — LISINOPRIL 20 MG/1
20 TABLET ORAL NIGHTLY
Status: DISCONTINUED | OUTPATIENT
Start: 2023-02-05 | End: 2023-02-11 | Stop reason: HOSPADM

## 2023-02-04 RX ORDER — CARVEDILOL 6.25 MG/1
6.25 TABLET ORAL 2 TIMES DAILY WITH MEALS
Status: DISCONTINUED | OUTPATIENT
Start: 2023-02-04 | End: 2023-02-11 | Stop reason: HOSPADM

## 2023-02-04 RX ADMIN — PANTOPRAZOLE SODIUM 40 MG: 40 TABLET, DELAYED RELEASE ORAL at 13:56

## 2023-02-04 RX ADMIN — SERTRALINE 100 MG: 100 TABLET, FILM COATED ORAL at 13:56

## 2023-02-04 RX ADMIN — HEPARIN SODIUM 5000 UNITS: 5000 INJECTION INTRAVENOUS; SUBCUTANEOUS at 08:40

## 2023-02-04 RX ADMIN — HYDROXYZINE HYDROCHLORIDE 25 MG: 25 TABLET ORAL at 10:57

## 2023-02-04 RX ADMIN — ROPINIROLE 2 MG: 2 TABLET, FILM COATED ORAL at 20:09

## 2023-02-04 RX ADMIN — ONDANSETRON 4 MG: 2 INJECTION INTRAMUSCULAR; INTRAVENOUS at 14:57

## 2023-02-04 RX ADMIN — ONDANSETRON 4 MG: 2 INJECTION INTRAMUSCULAR; INTRAVENOUS at 20:38

## 2023-02-04 RX ADMIN — LEVETIRACETAM 250 MG: 250 TABLET, FILM COATED ORAL at 20:09

## 2023-02-04 RX ADMIN — LEVOTHYROXINE SODIUM 300 MCG: 0.15 TABLET ORAL at 06:50

## 2023-02-04 RX ADMIN — LORAZEPAM 0.5 MG: 0.5 TABLET ORAL at 20:09

## 2023-02-04 RX ADMIN — HEPARIN SODIUM 5000 UNITS: 5000 INJECTION INTRAVENOUS; SUBCUTANEOUS at 18:42

## 2023-02-04 RX ADMIN — AMLODIPINE BESYLATE 10 MG: 10 TABLET ORAL at 13:57

## 2023-02-04 RX ADMIN — LEVETIRACETAM 250 MG: 250 TABLET, FILM COATED ORAL at 13:56

## 2023-02-04 RX ADMIN — ASPIRIN 81 MG: 81 TABLET, COATED ORAL at 13:56

## 2023-02-04 RX ADMIN — ACETAMINOPHEN 650 MG: 325 TABLET, FILM COATED ORAL at 06:50

## 2023-02-04 RX ADMIN — CARVEDILOL 6.25 MG: 6.25 TABLET, FILM COATED ORAL at 18:42

## 2023-02-04 RX ADMIN — HEPARIN SODIUM 5000 UNITS: 5000 INJECTION INTRAVENOUS; SUBCUTANEOUS at 03:06

## 2023-02-04 RX ADMIN — FOLIC ACID 1 MG: 1 TABLET ORAL at 13:56

## 2023-02-04 NOTE — PROGRESS NOTES
Name: Zaina Martinez ADMIT: 2023   : 1965  PCP: Provider, No Known    MRN: [id number] LOS: 1 days   AGE/SEX: 57 y.o. female  ROOM: [facility]     Subjective   Subjective     She says that everything is going really badly today. She reports some nausea and she appears very anxious and tearful. She's reluctant to take prn medications       Objective   Objective   Vital Signs  Temp:  [98 °F (36.7 °C)-98.8 °F (37.1 °C)] 98.8 °F (37.1 °C)  Heart Rate:  [76-86] 86  Resp:  [16-18] 18  BP: (150-161)/(72-81) 161/74  SpO2:  [97 %-100 %] 100 %  on  Flow (L/min):  [1] 1;   Device (Oxygen Therapy): room air  Body mass index is 23.81 kg/m².  Physical Exam  Constitutional:       General: She is not in acute distress.     Appearance: She is not toxic-appearing.   Cardiovascular:      Rate and Rhythm: Normal rate and regular rhythm.      Heart sounds: Normal heart sounds.   Pulmonary:      Effort: Pulmonary effort is normal. No respiratory distress.      Breath sounds: Normal breath sounds. No wheezing, rhonchi or rales.   Abdominal:      General: Bowel sounds are normal. There is no distension.      Palpations: Abdomen is soft.      Tenderness: There is no abdominal tenderness. There is no guarding or rebound.   Musculoskeletal:         General: No tenderness.      Right lower leg: No edema.      Left lower leg: No edema.   Neurological:      Mental Status: She is alert.   Psychiatric:         Mood and Affect: Mood is anxious. Affect is tearful.         Results Review     I reviewed the patient's new clinical results.  Results from last 7 days   Lab Units 23  0626 23  0723 23  1109   WBC 10*3/mm3 11.81* 12.10* 14.01*   HEMOGLOBIN g/dL 9.7* 10.5* 11.2*   PLATELETS 10*3/mm3 275 264 330     Results from last 7 days   Lab Units 23  0626 23  0723 23  1109   SODIUM mmol/L 128* 132* 133*   POTASSIUM mmol/L 4.2 3.8 3.7   CHLORIDE mmol/L 95* 99 96*   CO2 mmol/L 21.0* 19.9* 26.0   BUN mg/dL 21*  14 25*   CREATININE mg/dL 2.59* 2.11* 3.13*   GLUCOSE mg/dL 150* 169* 190*   Estimated Creatinine Clearance: 22.4 mL/min (A) (by C-G formula based on SCr of 2.59 mg/dL (H)).  Results from last 7 days   Lab Units 02/04/23  0626 02/03/23  0723 02/01/23  1109   ALBUMIN g/dL 3.0* 2.9* 2.9*   BILIRUBIN mg/dL  --   --  0.6   ALK PHOS U/L  --   --  226*   AST (SGOT) U/L  --   --  12   ALT (SGPT) U/L  --   --  <5     Results from last 7 days   Lab Units 02/04/23  0626 02/03/23  0723 02/01/23  1109   CALCIUM mg/dL 8.2* 8.2* 8.2*   ALBUMIN g/dL 3.0* 2.9* 2.9*   PHOSPHORUS mg/dL 2.0* 2.0* 2.9     Results from last 7 days   Lab Units 02/01/23  1109   LACTATE mmol/L 0.9     COVID19   Date Value Ref Range Status   02/01/2023 Not Detected Not Detected - Ref. Range Final   12/26/2022 Not Detected Not Detected - Ref. Range Final   11/09/2022 Not Detected Not Detected - Ref. Range Final   10/26/2022 Not Detected Not Detected - Ref. Range Final   09/03/2022 Not Detected Not Detected - Ref. Range Final     Hemoglobin A1C   Date/Time Value Ref Range Status   02/02/2023 0923 6.20 (H) 4.80 - 5.60 % Final     Glucose   Date/Time Value Ref Range Status   02/04/2023 0607 145 (H) 70 - 130 mg/dL Final     Comment:     Meter: [id number] : [id number] Keerthi Hare NA   02/03/2023 2042 184 (H) 70 - 130 mg/dL Final     Comment:     Meter: [id number] : [id number] Keerthi Hare NA   02/03/2023 1112 197 (H) 70 - 130 mg/dL Final     Comment:     Meter: [id number] : [id number] Raina Macedo NA   02/03/2023 0543 163 (H) 70 - 130 mg/dL Final     Comment:     Meter: [id number] : [id number] Estrada Covarrubias    02/02/2023 2025 224 (H) 70 - 130 mg/dL Final     Comment:     Meter: [id number] : [id number] Estrada Cvoarrubias    02/02/2023 1831 119 70 - 130 mg/dL Final     Comment:     Meter: [id number] : [id number] Raina Macedo    02/02/2023 1121 208 (H) 70 - 130 mg/dL Final     Comment:     Meter: [id number] : [id number]  Raina SAAVEDRA       XR Chest 1 View  Narrative: XR CHEST 1 VW-     HISTORY: Female who is 57 years-old,  cough     TECHNIQUE: Frontal view of the chest     COMPARISON: 01/22/2023     FINDINGS: Right-sided central venous catheter appears stable. NG tube  has been removed. Patient is rotated towards the right. The heart  appears mildly enlarged. Pulmonary vasculature unremarkable. Small left  basilar atelectasis/infiltrate/effusion, appears decreased from the  prior exam. No pneumothorax. No acute osseous process.     Impression: Small left basilar atelectasis/infiltrate/effusion, appears  decreased from the prior exam.     This report was finalized on 2/1/2023 11:29 AM by Dr. Jared Kern M.D.       Scheduled Medications  amLODIPine, 10 mg, Oral, Q24H  aspirin, 81 mg, Oral, Daily  carvedilol, 6.25 mg, Oral, BID With Meals  folic acid, 1 mg, Oral, Daily  heparin (porcine), 5,000 Units, Subcutaneous, Q8H  insulin glargine, 3 Units, Subcutaneous, Nightly  insulin lispro, 0-9 Units, Subcutaneous, TID With Meals  lactulose, 10 g, Oral, BID  levETIRAcetam, 250 mg, Oral, BID  levothyroxine, 300 mcg, Oral, Q AM  lidocaine, 1 patch, Transdermal, Q24H  lisinopril, 10 mg, Oral, Nightly  pantoprazole, 40 mg, Oral, Daily  rOPINIRole, 2 mg, Oral, Nightly  sertraline, 100 mg, Oral, Daily    Infusions   Diet  Diet: Renal Diets; Low Sodium (2-3g), Low Potassium; Texture: Regular Texture (IDDSI 7); Fluid Consistency: Thin (IDDSI 0)       Assessment/Plan     Active Hospital Problems    Diagnosis  POA   • **Generalized weakness [R53.1]  Yes   • Severe malnutrition ([facility]) [E43]  Yes   • Pyuria [R82.81]  Yes   • Chronic systolic CHF (congestive heart failure) ([facility]) [I50.22]  Yes   • History of stroke- R MCA s/p TPA with subsequent ICH with debility [Z86.73]  Not Applicable   • ESRD (end stage renal disease) ([facility]) [N18.6]  Yes   • Hypothyroidism [E03.9]  Yes   • Hypertensive disorder [I10]  Yes   • Type 2 diabetes mellitus with  kidney complication, with long-term current use of insulin (HCC) [E11.29, Z79.4]  Not Applicable      Resolved Hospital Problems   No resolved problems to display.       57 y.o. female admitted with Generalized weakness.    · Generalized weakness-this is the patient's 18th admission in 13 months. She has refused rehab in the past, but is agreeable this time and her  cannot care for her at home. CCP is working on getting her set up with a nursing facility  · Pyuria-asymptomatic. ID recommends monitoring off abx  · ESRD on HD-nephrology is consulted for routine dialysis  · Chronic systolic heart failure with recovered EF-on coreg. Started on lisinopril this admission. Cannot tolerate SGLT2i due to her ESRD   · Type 2 diabetes on insulin-a1c 6.2. glucose is adequately controlled. Continue home regimen and sliding scale  · Hypertension-increase lisinopril dose  · Hypothyroidism-synthroid. She did have trouble correcting her TSH despite supplementation for a month during her prior hospitalization and required IV synthroid. Free t4 here is normal  · Rheumatoid arthritis-previously on chronic steroids, but these appear to have been stopped several months ago. AM cortisol was normal  · Paroxysmal afib-not a candidate for AC given recent hemorrhagic shock. On asa and coreg  · Seizure disorder-keppra  · GERD-ppi  · History of CVA  · Recent right femur fracture  · History of left renal hematoma causing life-threatening hemorrhagic shock s/p left renal artery embolization on 1/1/23-no longer a candidate for AC  · Anxiety-change hydroxyzine to ativan. Ask access to see  · Heparin SC for DVT prophylaxis.  · Full code.  · Discussed with patient and nursing staff.  · Anticipate discharge to SNU facility once arrangements have been made.      Ronnie Baptiste MD  [facility]  02/04/23  14:54 EST    I wore protective equipment throughout this patient encounter including a face mask, gloves and protective  eyewear.  Hand hygiene was performed before donning protective equipment and after removal when leaving the room.

## 2023-02-04 NOTE — PLAN OF CARE
Goal Outcome Evaluation:      Patient refused Lactulose and insulin. No bowel movements this shift. Tylenol and Requip given for discomfort in legs. Anxiety improved with Atarax.

## 2023-02-04 NOTE — CONSULTS
"[facility] consult d/t anxiety. Pt readmitted on 2/1/23 after being discharged 3 days prior for weakness, no appetite, nausea/vomitting/diarrhea. RN reports pt has struggled w/ anxiety and tearfulness today.     Pt asleep upon entry, woke to verbal stimuli, A&Ox4. Pt placed blanket over face throughout entire discussion, whispered responses, had flat affect and pessimistic attitude. Pt lacked insight and judgement r/t her situation. She rates both depression and anxiety as a \"8\" currently. She denies SI/HI/AVTH/WTBD currently. She reports not having restful sleep \"waking up all night\" and having no appetite \"nothing tastes good, this happens on/off\" w/ no known triggers. Current stressors: \"I don't want to go a nursing home, I've been two times before and they didn't take care of me, I felt neglected, they didn't give me my meds for 24 hours and my  pulled me out both times after 24 hrs. This time he won't pull me out because I'm not getting better and I'm scared I'll be neglected\".     MENTAL HEALTH HX: Pt is prescribed Zoloft 100mg \"for so long, not helpful anymore\". She no longer has MD or NP to get refills from d/t pt stating she was \"dropped\" by two previous providers for unknown reasons, but then stated her insurance won't pay for her medications even if she did get a prescription. She has been seen by psychiatry at [facility] December 2021, May 2022 & June 2022 for medication management to treat depression. Pt reports hx of anxiety and depression \"when I feel I can't handle things\". She denies hx of violence/trauma/abuse. Denies hx of seeing a counselor or having inpatient or IOP tx. Family hx: mother for depression/anxiety.     SUBSTANCE USE HX: Pt denies hx of AOD use in the past/present. Pt reports eldest dtr overdosed two weeks ago and is in drug rehab currently, pt and her spouse are caring for her dtr's minor children currently.    SOCIAL: Pt is a 57 y.o. M/W/F. She and her spouse, " Marv Martinez [phone], have been  for 36 years. They live in a home, she reports is a safe environment. Their 10 and 12 y.o. grandchildren are currently residing w/ them. She is of [employer]. Her dtr's are 31 and 34. She does not work. She enjoys spending time w/ her grandchildren. H[employer] graduate and completed [employer] school. Her dtrs and  are supportive of her.     PLAN: Pt requested to see psychiatrist for medication adjustment. Pt states she does not want resources for psychiatry outpatient d/t stating insurance won't pay for it, pt may be more willing to receive resources when she is more awake and medication changes occur to assist w/ mood. Gave report to RN, who will place consult for psychiatry to see.   [facility] following.

## 2023-02-04 NOTE — NURSING NOTE
HD completed with 3000mL net volume removed.  Blood was returned post treatment and lumens were locked with heparin before placing new caps on hubs.  Lumens were then wrapped with gauze and taped.    Pre VS:   Post VS:  /79   /82  HR 75   HR 88    Net volume removed: 3000mL    [facility]

## 2023-02-04 NOTE — PROGRESS NOTES
[facility] Progress Note      Patient Name: Zaina Martinez  : 1965  MRN: [id number]  Primary Care Physician:  Provider, No Known  Date of admission: 2023    Subjective     Interval History:   F/u ESRD    Review of Systems:   Denies dyspnea  On room air    Objective     Vitals:   Temp:  [98 °F (36.7 °C)-98.6 °F (37 °C)] 98 °F (36.7 °C)  Heart Rate:  [76-86] 76  Resp:  [16-18] 18  BP: (150-175)/(72-88) 155/81  Flow (L/min):  [1] 1    Intake/Output Summary (Last 24 hours) at 2023 0749  Last data filed at 2/3/2023 1242  Gross per 24 hour   Intake 458 ml   Output --   Net 458 ml       Physical Exam:    General Appearance: frail drowsy but arousable WF in no distress  Neck: RIJ TDC, no JVD  Lungs: CTA bilat no rales  Heart: RRR, normal S1 and S2  Abdomen: soft, nontender, nondistended  Extremities: no edema, cyanosis or clubbing      Scheduled Meds:     amLODIPine, 10 mg, Oral, Q24H  aspirin, 81 mg, Oral, Daily  carvedilol, 3.125 mg, Oral, BID With Meals  folic acid, 1 mg, Oral, Daily  heparin (porcine), 5,000 Units, Subcutaneous, Q8H  insulin glargine, 3 Units, Subcutaneous, Nightly  insulin lispro, 0-9 Units, Subcutaneous, TID With Meals  lactulose, 10 g, Oral, BID  levETIRAcetam, 250 mg, Oral, BID  levothyroxine, 300 mcg, Oral, Q AM  lidocaine, 1 patch, Transdermal, Q24H  lisinopril, 10 mg, Oral, Q24H  pantoprazole, 40 mg, Oral, Daily  rOPINIRole, 2 mg, Oral, Nightly  sertraline, 100 mg, Oral, Daily      IV Meds:        Results Reviewed:   I have personally reviewed the results from the time of this admission to 2023 07:49 EST     Results from last 7 days   Lab Units 23  0723 23  1109   SODIUM mmol/L 132* 133*   POTASSIUM mmol/L 3.8 3.7   CHLORIDE mmol/L 99 96*   CO2 mmol/L 19.9* 26.0   BUN mg/dL 14 25*   CREATININE mg/dL 2.11* 3.13*   CALCIUM mg/dL 8.2* 8.2*   BILIRUBIN mg/dL  --  0.6   ALK PHOS U/L  --  226*   ALT (SGPT) U/L  --  <5   AST (SGOT) U/L  --  12    GLUCOSE mg/dL 169* 190*     Estimated Creatinine Clearance: 27.4 mL/min (A) (by C-G formula based on SCr of 2.11 mg/dL (H)).  Results from last 7 days   Lab Units 02/03/23  0723 02/01/23  1109   PHOSPHORUS mg/dL 2.0* 2.9         Results from last 7 days   Lab Units 02/04/23  0626 02/03/23  0723 02/01/23  1109   WBC 10*3/mm3 11.81* 12.10* 14.01*   HEMOGLOBIN g/dL 9.7* 10.5* 11.2*   PLATELETS 10*3/mm3 275 264 330           Assessment / Plan     ASSESSMENT:  -End-stage renal disease and diabetic and hypertensive glomerulosclerosis on maintenance hemodialysis TTS via RIJ TDC.  HD today.  Volume stable.  Labs pending  -Severe weakness prompting admission - plan for rehab placement  -Diabetes mellitus type 2 with renal complete  -Hypertension with chronic kidney disease uncontrolled  -Hypothyroidism on replacement therapy  -Anemia of chronic kidney disease hemoglobin donw 9.7 on long-acting ANDRAE as an outpatient    PLAN:  HD today  Inc coreg 6.25 BID  Rehab placement  Note palliative care consultation, no change in goals of care  No labs needed tomorrow from my standpoint      Frederic Powell MD  02/04/23  07:49 EST    [facility]  [phone]

## 2023-02-05 ENCOUNTER — APPOINTMENT (OUTPATIENT)
Dept: GENERAL RADIOLOGY | Facility: HOSPITAL | Age: 58
End: 2023-02-05
Payer: MEDICARE

## 2023-02-05 LAB
ALBUMIN SERPL-MCNC: 2.7 G/DL (ref 3.5–5.2)
ALBUMIN/GLOB SERPL: 0.9 G/DL
ALP SERPL-CCNC: 176 U/L (ref 39–117)
ALT SERPL W P-5'-P-CCNC: <5 U/L (ref 1–33)
ANION GAP SERPL CALCULATED.3IONS-SCNC: 13.9 MMOL/L (ref 5–15)
AST SERPL-CCNC: 13 U/L (ref 1–32)
B PARAPERT DNA SPEC QL NAA+PROBE: NOT DETECTED
B PERT DNA SPEC QL NAA+PROBE: NOT DETECTED
BILIRUB SERPL-MCNC: 0.5 MG/DL (ref 0–1.2)
BUN SERPL-MCNC: 14 MG/DL (ref 6–20)
BUN/CREAT SERPL: 5.2 (ref 7–25)
C PNEUM DNA NPH QL NAA+NON-PROBE: NOT DETECTED
CALCIUM SPEC-SCNC: 8.3 MG/DL (ref 8.6–10.5)
CHLORIDE SERPL-SCNC: 97 MMOL/L (ref 98–107)
CO2 SERPL-SCNC: 20.1 MMOL/L (ref 22–29)
CREAT SERPL-MCNC: 2.68 MG/DL (ref 0.57–1)
DEPRECATED RDW RBC AUTO: 48.7 FL (ref 37–54)
EGFRCR SERPLBLD CKD-EPI 2021: 20.2 ML/MIN/1.73
ERYTHROCYTE [DISTWIDTH] IN BLOOD BY AUTOMATED COUNT: 16.3 % (ref 12.3–15.4)
FLUAV SUBTYP SPEC NAA+PROBE: NOT DETECTED
FLUBV RNA ISLT QL NAA+PROBE: NOT DETECTED
GLOBULIN UR ELPH-MCNC: 3.1 GM/DL
GLUCOSE BLDC GLUCOMTR-MCNC: 102 MG/DL (ref 70–130)
GLUCOSE BLDC GLUCOMTR-MCNC: 106 MG/DL (ref 70–130)
GLUCOSE BLDC GLUCOMTR-MCNC: 133 MG/DL (ref 70–130)
GLUCOSE BLDC GLUCOMTR-MCNC: 90 MG/DL (ref 70–130)
GLUCOSE SERPL-MCNC: 116 MG/DL (ref 65–99)
HADV DNA SPEC NAA+PROBE: NOT DETECTED
HCOV 229E RNA SPEC QL NAA+PROBE: NOT DETECTED
HCOV HKU1 RNA SPEC QL NAA+PROBE: NOT DETECTED
HCOV NL63 RNA SPEC QL NAA+PROBE: NOT DETECTED
HCOV OC43 RNA SPEC QL NAA+PROBE: NOT DETECTED
HCT VFR BLD AUTO: 31.2 % (ref 34–46.6)
HGB BLD-MCNC: 9.8 G/DL (ref 12–15.9)
HMPV RNA NPH QL NAA+NON-PROBE: NOT DETECTED
HPIV1 RNA ISLT QL NAA+PROBE: NOT DETECTED
HPIV2 RNA SPEC QL NAA+PROBE: NOT DETECTED
HPIV3 RNA NPH QL NAA+PROBE: NOT DETECTED
HPIV4 P GENE NPH QL NAA+PROBE: NOT DETECTED
HYPOCHROMIA BLD QL: ABNORMAL
LYMPHOCYTES # BLD MANUAL: 1.09 10*3/MM3 (ref 0.7–3.1)
LYMPHOCYTES NFR BLD MANUAL: 8 % (ref 5–12)
M PNEUMO IGG SER IA-ACNC: NOT DETECTED
MCH RBC QN AUTO: 26.3 PG (ref 26.6–33)
MCHC RBC AUTO-ENTMCNC: 31.4 G/DL (ref 31.5–35.7)
MCV RBC AUTO: 83.6 FL (ref 79–97)
MONOCYTES # BLD: 1.25 10*3/MM3 (ref 0.1–0.9)
MRSA DNA SPEC QL NAA+PROBE: ABNORMAL
NEUTROPHILS # BLD AUTO: 13.28 10*3/MM3 (ref 1.7–7)
NEUTROPHILS NFR BLD MANUAL: 85 % (ref 42.7–76)
NRBC BLD AUTO-RTO: 0 /100 WBC (ref 0–0.2)
PLAT MORPH BLD: NORMAL
PLATELET # BLD AUTO: 257 10*3/MM3 (ref 140–450)
PMV BLD AUTO: 10.2 FL (ref 6–12)
POTASSIUM SERPL-SCNC: 4.3 MMOL/L (ref 3.5–5.2)
PROCALCITONIN SERPL-MCNC: 0.68 NG/ML (ref 0–0.25)
PROT SERPL-MCNC: 5.8 G/DL (ref 6–8.5)
RBC # BLD AUTO: 3.73 10*6/MM3 (ref 3.77–5.28)
RHINOVIRUS RNA SPEC NAA+PROBE: NOT DETECTED
RSV RNA NPH QL NAA+NON-PROBE: NOT DETECTED
SARS-COV-2 RNA NPH QL NAA+NON-PROBE: NOT DETECTED
SODIUM SERPL-SCNC: 131 MMOL/L (ref 136–145)
TROPONIN T SERPL-MCNC: 0.35 NG/ML (ref 0–0.03)
VARIANT LYMPHS NFR BLD MANUAL: 7 % (ref 19.6–45.3)
WBC MORPH BLD: NORMAL
WBC NRBC COR # BLD: 15.62 10*3/MM3 (ref 3.4–10.8)

## 2023-02-05 PROCEDURE — G0378 HOSPITAL OBSERVATION PER HR: HCPCS

## 2023-02-05 PROCEDURE — 0202U NFCT DS 22 TRGT SARS-COV-2: CPT | Performed by: STUDENT IN AN ORGANIZED HEALTH CARE EDUCATION/TRAINING PROGRAM

## 2023-02-05 PROCEDURE — 25010000002 HEPARIN (PORCINE) PER 1000 UNITS: Performed by: STUDENT IN AN ORGANIZED HEALTH CARE EDUCATION/TRAINING PROGRAM

## 2023-02-05 PROCEDURE — 96372 THER/PROPH/DIAG INJ SC/IM: CPT

## 2023-02-05 PROCEDURE — 85007 BL SMEAR W/DIFF WBC COUNT: CPT | Performed by: STUDENT IN AN ORGANIZED HEALTH CARE EDUCATION/TRAINING PROGRAM

## 2023-02-05 PROCEDURE — 25010000002 ONDANSETRON PER 1 MG: Performed by: NURSE PRACTITIONER

## 2023-02-05 PROCEDURE — 82962 GLUCOSE BLOOD TEST: CPT

## 2023-02-05 PROCEDURE — 84484 ASSAY OF TROPONIN QUANT: CPT | Performed by: NURSE PRACTITIONER

## 2023-02-05 PROCEDURE — 63710000001 ONDANSETRON PER 8 MG: Performed by: STUDENT IN AN ORGANIZED HEALTH CARE EDUCATION/TRAINING PROGRAM

## 2023-02-05 PROCEDURE — 87641 MR-STAPH DNA AMP PROBE: CPT | Performed by: STUDENT IN AN ORGANIZED HEALTH CARE EDUCATION/TRAINING PROGRAM

## 2023-02-05 PROCEDURE — 84145 PROCALCITONIN (PCT): CPT | Performed by: STUDENT IN AN ORGANIZED HEALTH CARE EDUCATION/TRAINING PROGRAM

## 2023-02-05 PROCEDURE — 71045 X-RAY EXAM CHEST 1 VIEW: CPT

## 2023-02-05 PROCEDURE — 80053 COMPREHEN METABOLIC PANEL: CPT | Performed by: STUDENT IN AN ORGANIZED HEALTH CARE EDUCATION/TRAINING PROGRAM

## 2023-02-05 PROCEDURE — 87040 BLOOD CULTURE FOR BACTERIA: CPT | Performed by: STUDENT IN AN ORGANIZED HEALTH CARE EDUCATION/TRAINING PROGRAM

## 2023-02-05 PROCEDURE — 93010 ELECTROCARDIOGRAM REPORT: CPT | Performed by: INTERNAL MEDICINE

## 2023-02-05 PROCEDURE — 85025 COMPLETE CBC W/AUTO DIFF WBC: CPT | Performed by: STUDENT IN AN ORGANIZED HEALTH CARE EDUCATION/TRAINING PROGRAM

## 2023-02-05 PROCEDURE — 96376 TX/PRO/DX INJ SAME DRUG ADON: CPT

## 2023-02-05 PROCEDURE — 93005 ELECTROCARDIOGRAM TRACING: CPT | Performed by: NURSE PRACTITIONER

## 2023-02-05 RX ORDER — LOPERAMIDE HCL 1 MG/7.5ML
2 SOLUTION ORAL 4 TIMES DAILY PRN
Status: DISCONTINUED | OUTPATIENT
Start: 2023-02-05 | End: 2023-02-11 | Stop reason: HOSPADM

## 2023-02-05 RX ORDER — ONDANSETRON 2 MG/ML
4 INJECTION INTRAMUSCULAR; INTRAVENOUS EVERY 6 HOURS PRN
Status: DISCONTINUED | OUTPATIENT
Start: 2023-02-05 | End: 2023-02-11 | Stop reason: HOSPADM

## 2023-02-05 RX ORDER — DOXYCYCLINE 100 MG/1
100 CAPSULE ORAL EVERY 12 HOURS SCHEDULED
Status: DISCONTINUED | OUTPATIENT
Start: 2023-02-05 | End: 2023-02-09

## 2023-02-05 RX ORDER — NITROGLYCERIN 0.4 MG/1
0.4 TABLET SUBLINGUAL
Status: DISCONTINUED | OUTPATIENT
Start: 2023-02-05 | End: 2023-02-11 | Stop reason: HOSPADM

## 2023-02-05 RX ORDER — DESVENLAFAXINE 25 MG/1
25 TABLET, EXTENDED RELEASE ORAL DAILY
Status: DISCONTINUED | OUTPATIENT
Start: 2023-02-05 | End: 2023-02-11 | Stop reason: HOSPADM

## 2023-02-05 RX ORDER — ONDANSETRON 4 MG/1
4 TABLET, FILM COATED ORAL EVERY 6 HOURS PRN
Status: DISCONTINUED | OUTPATIENT
Start: 2023-02-05 | End: 2023-02-11 | Stop reason: HOSPADM

## 2023-02-05 RX ORDER — LEVOFLOXACIN 250 MG/1
250 TABLET ORAL EVERY 24 HOURS
Status: DISCONTINUED | OUTPATIENT
Start: 2023-02-06 | End: 2023-02-09

## 2023-02-05 RX ORDER — LEVOFLOXACIN 750 MG/1
750 TABLET ORAL ONCE
Status: COMPLETED | OUTPATIENT
Start: 2023-02-05 | End: 2023-02-05

## 2023-02-05 RX ADMIN — LEVOFLOXACIN 750 MG: 750 TABLET, FILM COATED ORAL at 18:06

## 2023-02-05 RX ADMIN — LORAZEPAM 0.5 MG: 0.5 TABLET ORAL at 21:19

## 2023-02-05 RX ADMIN — ONDANSETRON 4 MG: 2 INJECTION INTRAMUSCULAR; INTRAVENOUS at 11:31

## 2023-02-05 RX ADMIN — LORAZEPAM 0.5 MG: 0.5 TABLET ORAL at 11:31

## 2023-02-05 RX ADMIN — LISINOPRIL 20 MG: 20 TABLET ORAL at 08:25

## 2023-02-05 RX ADMIN — ACETAMINOPHEN 650 MG: 325 TABLET, FILM COATED ORAL at 16:06

## 2023-02-05 RX ADMIN — LEVETIRACETAM 250 MG: 250 TABLET, FILM COATED ORAL at 08:25

## 2023-02-05 RX ADMIN — AMLODIPINE BESYLATE 10 MG: 10 TABLET ORAL at 08:25

## 2023-02-05 RX ADMIN — ONDANSETRON 4 MG: 2 INJECTION INTRAMUSCULAR; INTRAVENOUS at 03:12

## 2023-02-05 RX ADMIN — FOLIC ACID 1 MG: 1 TABLET ORAL at 08:25

## 2023-02-05 RX ADMIN — ONDANSETRON HYDROCHLORIDE 4 MG: 4 TABLET, FILM COATED ORAL at 18:06

## 2023-02-05 RX ADMIN — HEPARIN SODIUM 5000 UNITS: 5000 INJECTION INTRAVENOUS; SUBCUTANEOUS at 03:07

## 2023-02-05 RX ADMIN — CARVEDILOL 6.25 MG: 6.25 TABLET, FILM COATED ORAL at 08:25

## 2023-02-05 RX ADMIN — LISINOPRIL 20 MG: 20 TABLET ORAL at 21:29

## 2023-02-05 RX ADMIN — ROPINIROLE 2 MG: 2 TABLET, FILM COATED ORAL at 21:20

## 2023-02-05 RX ADMIN — PANTOPRAZOLE SODIUM 40 MG: 40 TABLET, DELAYED RELEASE ORAL at 08:25

## 2023-02-05 RX ADMIN — LEVETIRACETAM 250 MG: 250 TABLET, FILM COATED ORAL at 21:19

## 2023-02-05 RX ADMIN — HEPARIN SODIUM 5000 UNITS: 5000 INJECTION INTRAVENOUS; SUBCUTANEOUS at 10:00

## 2023-02-05 RX ADMIN — DOXYCYCLINE 100 MG: 100 CAPSULE ORAL at 21:25

## 2023-02-05 RX ADMIN — CARVEDILOL 6.25 MG: 6.25 TABLET, FILM COATED ORAL at 18:06

## 2023-02-05 RX ADMIN — LOPERAMIDE HCL 2 MG: 1 SOLUTION ORAL at 18:05

## 2023-02-05 RX ADMIN — HEPARIN SODIUM 5000 UNITS: 5000 INJECTION INTRAVENOUS; SUBCUTANEOUS at 18:06

## 2023-02-05 RX ADMIN — SERTRALINE 100 MG: 100 TABLET, FILM COATED ORAL at 08:25

## 2023-02-05 RX ADMIN — LEVOTHYROXINE SODIUM 300 MCG: 0.15 TABLET ORAL at 06:04

## 2023-02-05 RX ADMIN — ACETAMINOPHEN 650 MG: 325 TABLET, FILM COATED ORAL at 00:39

## 2023-02-05 RX ADMIN — DESVENLAFAXINE SUCCINATE 25 MG: 25 TABLET, EXTENDED RELEASE ORAL at 18:06

## 2023-02-05 RX ADMIN — ASPIRIN 81 MG: 81 TABLET, COATED ORAL at 08:25

## 2023-02-05 NOTE — PLAN OF CARE
Goal Outcome Evaluation:  Plan of Care Reviewed With: patient        Progress: no change  Outcome Evaluation: transfer from 6S, axox4, anxious at times, blanchable redness noted on coccyx area-mepliex applied, accumax to bed, turning q2h to prevent skin breakdown, SL, c/o of pain-tylenol given, c/o of nausea Zofran given, monitoring blood sugars, brief in place, room air,  loose BM x2 tonight, slept  b/t care, calm and cooperative tonight, will continue to monitor

## 2023-02-05 NOTE — PROGRESS NOTES
Name: Zaina Martinez ADMIT: 2023   : 1965  PCP: Provider, No Known    MRN: [id number] LOS: 1 days   AGE/SEX: 57 y.o. female  ROOM: [facility]     Subjective   Subjective     No events overnight. She appears much more comfortable and less anxious today. She has no complaints. She did have a very slightly fever last night. She endorses a dry cough. She denies any urinary symptoms       Objective   Objective   Vital Signs  Temp:  [97.8 °F (36.6 °C)-100.5 °F (38.1 °C)] 99 °F (37.2 °C)  Heart Rate:  [77-83] 77  Resp:  [16-20] 16  BP: (153-167)/(70-83) 154/70  SpO2:  [94 %-98 %] 96 %  on   ;   Device (Oxygen Therapy): room air  Body mass index is 23.87 kg/m².  Physical Exam  Constitutional:       General: She is not in acute distress.     Appearance: She is not toxic-appearing.   Cardiovascular:      Rate and Rhythm: Normal rate and regular rhythm.      Heart sounds: Normal heart sounds.   Pulmonary:      Effort: Pulmonary effort is normal. No respiratory distress.      Breath sounds: Normal breath sounds. No wheezing, rhonchi or rales.   Abdominal:      General: Bowel sounds are normal. There is no distension.      Palpations: Abdomen is soft.      Tenderness: There is no abdominal tenderness. There is no guarding or rebound.   Musculoskeletal:         General: No tenderness.      Right lower leg: No edema.      Left lower leg: No edema.   Neurological:      Mental Status: She is alert.   Psychiatric:         Mood and Affect: Mood normal.         Behavior: Behavior normal.         Results Review     I reviewed the patient's new clinical results.  Results from last 7 days   Lab Units 23  0626 23  0723 23  1109   WBC 10*3/mm3 11.81* 12.10* 14.01*   HEMOGLOBIN g/dL 9.7* 10.5* 11.2*   PLATELETS 10*3/mm3 275 264 330     Results from last 7 days   Lab Units 23  1352 23  0626 23  0723 23  1109   SODIUM mmol/L 131* 128* 132* 133*   POTASSIUM mmol/L 4.3 4.2 3.8 3.7   CHLORIDE  mmol/L 97* 95* 99 96*   CO2 mmol/L 20.1* 21.0* 19.9* 26.0   BUN mg/dL 14 21* 14 25*   CREATININE mg/dL 2.68* 2.59* 2.11* 3.13*   GLUCOSE mg/dL 116* 150* 169* 190*   Estimated Creatinine Clearance: 21.6 mL/min (A) (by C-G formula based on SCr of 2.68 mg/dL (H)).  Results from last 7 days   Lab Units 02/05/23 1352 02/04/23 0626 02/03/23 0723 02/01/23  1109   ALBUMIN g/dL 2.7* 3.0* 2.9* 2.9*   BILIRUBIN mg/dL 0.5  --   --  0.6   ALK PHOS U/L 176*  --   --  226*   AST (SGOT) U/L 13  --   --  12   ALT (SGPT) U/L <5  --   --  <5     Results from last 7 days   Lab Units 02/05/23 1352 02/04/23 0626 02/03/23 0723 02/01/23  1109   CALCIUM mg/dL 8.3* 8.2* 8.2* 8.2*   ALBUMIN g/dL 2.7* 3.0* 2.9* 2.9*   PHOSPHORUS mg/dL  --  2.0* 2.0* 2.9     Results from last 7 days   Lab Units 02/05/23 1352 02/01/23  1109   PROCALCITONIN ng/mL 0.68*  --    LACTATE mmol/L  --  0.9     COVID19   Date Value Ref Range Status   02/01/2023 Not Detected Not Detected - Ref. Range Final   12/26/2022 Not Detected Not Detected - Ref. Range Final   11/09/2022 Not Detected Not Detected - Ref. Range Final   10/26/2022 Not Detected Not Detected - Ref. Range Final   09/03/2022 Not Detected Not Detected - Ref. Range Final     Glucose   Date/Time Value Ref Range Status   02/05/2023 1135 102 70 - 130 mg/dL Final     Comment:     Meter: [id number] : [id number] Marc Kate QUENTIN   02/05/2023 0510 90 70 - 130 mg/dL Final     Comment:     Meter: [id number] : [id number] Silvano Mckeon NA   02/04/2023 2117 98 70 - 130 mg/dL Final     Comment:     Meter: [id number] : [id number] Hawa Christensen NA   02/04/2023 1603 116 70 - 130 mg/dL Final     Comment:     Meter: [id number] : [id number] Phani Rosa NA   02/04/2023 0607 145 (H) 70 - 130 mg/dL Final     Comment:     Meter: [id number] : [id number] Keerthi Hare NA   02/03/2023 2042 184 (H) 70 - 130 mg/dL Final     Comment:     Meter: [id number] : [id number] Keerthi Hare NA   02/03/2023 1112  197 (H) 70 - 130 mg/dL Final     Comment:     Meter: [id number] : [id number] Raina SAAVEDRA       XR Chest 1 View  Narrative: XR CHEST 1 VW-     HISTORY: Female who is 57 years-old,  cough     TECHNIQUE: Frontal view of the chest     COMPARISON: 01/22/2023     FINDINGS: Right-sided central venous catheter appears stable. NG tube  has been removed. Patient is rotated towards the right. The heart  appears mildly enlarged. Pulmonary vasculature unremarkable. Small left  basilar atelectasis/infiltrate/effusion, appears decreased from the  prior exam. No pneumothorax. No acute osseous process.     Impression: Small left basilar atelectasis/infiltrate/effusion, appears  decreased from the prior exam.     This report was finalized on 2/1/2023 11:29 AM by Dr. Jared Kern M.D.       Scheduled Medications  amLODIPine, 10 mg, Oral, Q24H  aspirin, 81 mg, Oral, Daily  carvedilol, 6.25 mg, Oral, BID With Meals  folic acid, 1 mg, Oral, Daily  heparin (porcine), 5,000 Units, Subcutaneous, Q8H  insulin glargine, 3 Units, Subcutaneous, Nightly  insulin lispro, 0-9 Units, Subcutaneous, TID With Meals  lactulose, 10 g, Oral, BID  levETIRAcetam, 250 mg, Oral, BID  levothyroxine, 300 mcg, Oral, Q AM  lidocaine, 1 patch, Transdermal, Q24H  lisinopril, 20 mg, Oral, Nightly  pantoprazole, 40 mg, Oral, Daily  rOPINIRole, 2 mg, Oral, Nightly  sertraline, 100 mg, Oral, Daily    Infusions   Diet  Diet: Regular/House Diet; Texture: Regular Texture (IDDSI 7); Fluid Consistency: Thin (IDDSI 0)       Assessment/Plan     Active Hospital Problems    Diagnosis  POA   • **Generalized weakness [R53.1]  Yes   • Severe malnutrition ([facility]) [E43]  Yes   • Pyuria [R82.81]  Yes   • Chronic systolic CHF (congestive heart failure) ([facility]) [I50.22]  Yes   • History of stroke- R MCA s/p TPA with subsequent ICH with debility [Z86.73]  Not Applicable   • ESRD (end stage renal disease) ([facility]) [N18.6]  Yes   • Hypothyroidism [E03.9]  Yes   • Hypertensive  disorder [I10]  Yes   • Type 2 diabetes mellitus with kidney complication, with long-term current use of insulin (HCC) [E11.29, Z79.4]  Not Applicable      Resolved Hospital Problems   No resolved problems to display.       57 y.o. female admitted with Generalized weakness.    · Low grade fever with associated dry cough-check a chest xray and respiratory viral panel again and check labs today. Her procalcitonin is a little bit elevated, but this may be due to her renal disease. Monitor fever curve off abx for the moment  · Generalized weakness-this is the patient's 18th admission in 13 months. She has refused rehab in the past, but is agreeable this time and her  cannot care for her at home. CCP is working on getting her set up with a nursing facility  · Pyuria-asymptomatic. ID recommends monitoring off abx  · ESRD on HD-nephrology is consulted for routine dialysis  · Chronic systolic heart failure with recovered EF-on coreg. Started on lisinopril this admission. Cannot tolerate SGLT2i due to her ESRD   · Type 2 diabetes on insulin-a1c 6.2. glucose is adequately controlled. Continue home regimen and sliding scale  · Hypertension-adequate control acutely  · Hypothyroidism-synthroid. She did have trouble correcting her TSH despite supplementation for a month during her prior hospitalization and required IV synthroid. Free t4 here is normal  · Rheumatoid arthritis-previously on chronic steroids, but these appear to have been stopped several months ago. AM cortisol was normal  · Paroxysmal afib-not a candidate for AC given recent hemorrhagic shock. On asa and coreg  · Seizure disorder-keppra  · GERD-ppi  · History of CVA  · Recent right femur fracture  · History of left renal hematoma causing life-threatening hemorrhagic shock s/p left renal artery embolization on 1/1/23-no longer a candidate for AC  · Anxiety-prn ativan. Psychiatry is consulted  · Heparin SC for DVT prophylaxis.  · Full code.  · Discussed with  patient and nursing staff.  · Anticipate discharge to SNU facility in 1-2 days.     Addendum: her cbc came back with a worsen leukocytosis. rvp is negative, but chest xray again shows possible LLL pneumonia. I will get stat blood cultures and start oral levaquin renally dosed. Check mrsa nares    Ronnie Baptiste MD  [facility]  02/05/23  14:56 EST    I wore protective equipment throughout this patient encounter including a face mask, gloves and protective eyewear.  Hand hygiene was performed before donning protective equipment and after removal when leaving the room.

## 2023-02-05 NOTE — NURSING NOTE
Access consult ordered d/t anxiety.     Patient currently resting; appears asleep. Received ativan 0.5mg at beginning of shift. Order for psychiatrist also placed for medication adjustment as requested by patient. Patient declined mental health resources when initial consult completed but will continue to follow and offer again.

## 2023-02-05 NOTE — CONSULTS
Requesting Provider:  Dr. Baptiste  Reason for Consult:  Medication management     Date of admission:  February 1, 2023  Date of assessment:  February 5, 2023     Chief Complaint: None given     History of presenting illness: Patient is a 57 y.o. female with a past psychiatric history of depression seen on consultation for same.  The patient had presented to the ED due to nausea, vomiting and weakness.  She had previously been discharged from the hospital 3 days prior.  She was admitted for further stabilization.     The patient had previously and seen on consultation by Dr. Suero June 28, 2022.  She was also seen by this physician on May 15, 2022. Please see these notes.  Patient has no history of inpatient psychiatric care.  She has no current outpatient provider.  She has no history of suicide attempts.  She is currently on Zoloft 100 mg and Ativan 0.5 mg every 6 hours as needed for anxiety.  She reports that she was previously on Zoloft 150 mg daily and this was decreased approximately 1 month ago.  She is not sure as to the reason but feels that it was not helpful.  She states that she has been on Zoloft for a long time and feels that it has stopped working.  She reports that she feels depressed and anxious.  She denies any acute suicidal ideations, homicidal ideations and audiovisual hallucinations.     Past psychiatric history: See HPI.     Past medical history:  Diagnoses:  Hypothyroidism, congestive heart failure, history of CVA, coronary artery disease, end-stage renal disease, hypertension, diabetes mellitus type 2, end-stage kidney disease, malnutrition, rheumatoid arthritis.  Medications:     Current Facility-Administered Medications   Medication Dose Route Frequency Provider Last Rate Last Admin   • acetaminophen (TYLENOL) tablet 650 mg  650 mg Oral Q6H PRN Anila Gandara APRN   650 mg at 02/05/23 0039   • amLODIPine (NORVASC) tablet 10 mg  10 mg Oral Q24H Starla Boston MD   10  mg at 02/05/23 0825   • aspirin EC tablet 81 mg  81 mg Oral Daily Starla Boston MD   81 mg at 02/05/23 0825   • carvedilol (COREG) tablet 6.25 mg  6.25 mg Oral BID With Meals Frederic Powell MD   6.25 mg at 02/05/23 0825   • dextrose (D50W) (25 g/50 mL) IV injection 25 g  25 g Intravenous Q15 Min PRN Starla Boston MD       • dextrose (GLUTOSE) oral gel 15 g  15 g Oral Q15 Min PRN Starla Boston MD       • folic acid (FOLVITE) tablet 1 mg  1 mg Oral Daily Starla Boston MD   1 mg at 02/05/23 0825   • glucagon (GLUCAGEN) injection 1 mg  1 mg Intramuscular Q15 Min PRN Starla Boston MD       • heparin (porcine) 5000 UNIT/ML injection 5,000 Units  5,000 Units Subcutaneous Q8H Ronnie Baptiste MD   5,000 Units at 02/05/23 1000   • heparin (porcine) injection 3,800 Units  3,800 Units Intracatheter PRN Reynaldo Sotelo MD   3,800 Units at 02/02/23 1715   • insulin glargine (LANTUS, SEMGLEE) injection 3 Units  3 Units Subcutaneous Nightly Starla Boston MD       • insulin lispro (ADMELOG) injection 0-9 Units  0-9 Units Subcutaneous TID With Meals Starla Boston MD   2 Units at 02/03/23 1824   • lactulose (CHRONULAC) 10 GM/15ML solution 10 g  10 g Oral BID Starla Boston MD       • levETIRAcetam (KEPPRA) tablet 250 mg  250 mg Oral BID Starla Boston MD   250 mg at 02/05/23 0825   • levothyroxine (SYNTHROID, LEVOTHROID) tablet 300 mcg  300 mcg Oral Q AM Starla Boston MD   300 mcg at 02/05/23 0604   • lidocaine (LIDODERM) 5 % 1 patch  1 patch Transdermal Q24H Ronnie Baptiste MD       • lisinopril (PRINIVIL,ZESTRIL) tablet 20 mg  20 mg Oral Nightly Ronnie Baptiste MD   20 mg at 02/05/23 0825   • LORazepam (ATIVAN) tablet 0.5 mg  0.5 mg Oral Q6H PRN Ronnie Baptiste MD   0.5 mg at 02/05/23 1131   • ondansetron (ZOFRAN) injection 4 mg  4 mg Intravenous Q6H PRN Giovani Islas APRN   4 mg at 02/05/23 1131   • pantoprazole (PROTONIX) EC  tablet 40 mg  40 mg Oral Daily Starla Bosotn MD   40 mg at 02/05/23 0825   • rOPINIRole (REQUIP) tablet 2 mg  2 mg Oral Nightly Anila Gandara APRN   2 mg at 02/04/23 2009   • sertraline (ZOLOFT) tablet 100 mg  100 mg Oral Daily Starla Boston MD   100 mg at 02/05/23 0825   • sodium chloride 0.9 % flush 10 mL  10 mL Intravenous PRN Jeanne Tariq APRN         Medication allergies: Ibuprofen, prochlorperazine.     Social history: Noncontributory     Family history: Noncontributory     Substance abuse history: None     Vital Signs     Temp:  99  Heart Rate: 77   Resp:  16  BP:  154/70     Mental Status Exam: The patient is found lying in bed.    She is alert and oriented x4.  Speech is fluent.  Mood is depressed and anxious.  Affect is congruent.  She denies any acute suicidal ideations, homicidal ideations and audiovisual hallucinations.  Thought processes are goal-directed.  Judgment and insight are okay.  Memory is intact.    Assessment:   Major depressive order, moderate, recurrent.     Treatment Plan:     I will reduce Zoloft to 50 mg daily.  I will initiate Pristiq 25 mg daily.  Plan cross titration over the next week. Patient would benefit from referral to outpatient community mental health services.     Thank you for this consultation.  Please contact [facility] for any additional requests.

## 2023-02-05 NOTE — PROGRESS NOTES
[facility] Progress Note      Patient Name: Zaina Martinez  : 1965  MRN: [id number]  Primary Care Physician:  Provider, No Known  Date of admission: 2023    Subjective     Interval History:   F/u ESRD     Review of Systems:   Resting comfortably  Dialyzed yesterday, 3L removed     Objective     Vitals:   Temp:  [97.8 °F (36.6 °C)-100.5 °F (38.1 °C)] 97.8 °F (36.6 °C)  Heart Rate:  [80-86] 80  Resp:  [16-20] 16  BP: (153-184)/() 155/82    Intake/Output Summary (Last 24 hours) at 2023 1010  Last data filed at 2023 1348  Gross per 24 hour   Intake 0 ml   Output 3000 ml   Net -3000 ml       Physical Exam:    General Appearance: frail WF sleeping comfortably  Neck: supple, no JVD  Lungs: CTA bilat no rales  Heart: RRR, normal S1 and S2  Abdomen: soft, nontender, nondistended  Extremities: no edema, cyanosis or clubbing      Scheduled Meds:     amLODIPine, 10 mg, Oral, Q24H  aspirin, 81 mg, Oral, Daily  carvedilol, 6.25 mg, Oral, BID With Meals  folic acid, 1 mg, Oral, Daily  heparin (porcine), 5,000 Units, Subcutaneous, Q8H  insulin glargine, 3 Units, Subcutaneous, Nightly  insulin lispro, 0-9 Units, Subcutaneous, TID With Meals  lactulose, 10 g, Oral, BID  levETIRAcetam, 250 mg, Oral, BID  levothyroxine, 300 mcg, Oral, Q AM  lidocaine, 1 patch, Transdermal, Q24H  lisinopril, 20 mg, Oral, Nightly  pantoprazole, 40 mg, Oral, Daily  rOPINIRole, 2 mg, Oral, Nightly  sertraline, 100 mg, Oral, Daily      IV Meds:        Results Reviewed:   I have personally reviewed the results from the time of this admission to 2023 10:10 EST     Results from last 7 days   Lab Units 23  0626 23  0723 23  1109   SODIUM mmol/L 128* 132* 133*   POTASSIUM mmol/L 4.2 3.8 3.7   CHLORIDE mmol/L 95* 99 96*   CO2 mmol/L 21.0* 19.9* 26.0   BUN mg/dL 21* 14 25*   CREATININE mg/dL 2.59* 2.11* 3.13*   CALCIUM mg/dL 8.2* 8.2* 8.2*   BILIRUBIN mg/dL  --   --  0.6   ALK PHOS U/L  --    --  226*   ALT (SGPT) U/L  --   --  <5   AST (SGOT) U/L  --   --  12   GLUCOSE mg/dL 150* 169* 190*     Estimated Creatinine Clearance: 22.4 mL/min (A) (by C-G formula based on SCr of 2.59 mg/dL (H)).  Results from last 7 days   Lab Units 02/04/23  0626 02/03/23  0723 02/01/23  1109   PHOSPHORUS mg/dL 2.0* 2.0* 2.9         Results from last 7 days   Lab Units 02/04/23  0626 02/03/23  0723 02/01/23  1109   WBC 10*3/mm3 11.81* 12.10* 14.01*   HEMOGLOBIN g/dL 9.7* 10.5* 11.2*   PLATELETS 10*3/mm3 275 264 330           Assessment / Plan     ASSESSMENT:  -End-stage renal disease and diabetic and hypertensive glomerulosclerosis on maintenance hemodialysis TTS via [facility] TDC.  HD yesterday, 3L removed.  Volume stable.  K 4.2 and Na 128 yesterday predialysis, bicarb 21  -Severe weakness prompting admission - plan for rehab placement  -Diabetes mellitus type 2 with renal complete  -Hypertension with chronic kidney disease uncontrolled - ACEi & coreg inc'd yesterday  -Hypothyroidism on replacement therapy  -Anemia of chronic kidney disease hemoglobin yesterday 9.7 on long-acting ANDRAE as an outpatient  -hypophosphatemia - mild, phos 2.0, due to poor intake, not on binder but on renal diet    PLAN:  Coreg and ACEi have been inc'd  Next HD TUES, can recheck labs then from my standpoint  Remove renal restrictions from diet  Eventual SNU       Frederic Powell MD  02/05/23  10:10 EST    [facility]  [phone]

## 2023-02-06 LAB
ANION GAP SERPL CALCULATED.3IONS-SCNC: 10 MMOL/L (ref 5–15)
BACTERIA SPEC AEROBE CULT: NORMAL
BACTERIA SPEC AEROBE CULT: NORMAL
BASOPHILS # BLD AUTO: 0.03 10*3/MM3 (ref 0–0.2)
BASOPHILS NFR BLD AUTO: 0.2 % (ref 0–1.5)
BUN SERPL-MCNC: 16 MG/DL (ref 6–20)
BUN/CREAT SERPL: 5.4 (ref 7–25)
CALCIUM SPEC-SCNC: 8.3 MG/DL (ref 8.6–10.5)
CHLORIDE SERPL-SCNC: 95 MMOL/L (ref 98–107)
CO2 SERPL-SCNC: 22 MMOL/L (ref 22–29)
CREAT SERPL-MCNC: 2.94 MG/DL (ref 0.57–1)
DEPRECATED RDW RBC AUTO: 50.4 FL (ref 37–54)
EGFRCR SERPLBLD CKD-EPI 2021: 18.1 ML/MIN/1.73
EOSINOPHIL # BLD AUTO: 0.01 10*3/MM3 (ref 0–0.4)
EOSINOPHIL NFR BLD AUTO: 0.1 % (ref 0.3–6.2)
ERYTHROCYTE [DISTWIDTH] IN BLOOD BY AUTOMATED COUNT: 16.2 % (ref 12.3–15.4)
GLUCOSE BLDC GLUCOMTR-MCNC: 103 MG/DL (ref 70–130)
GLUCOSE BLDC GLUCOMTR-MCNC: 122 MG/DL (ref 70–130)
GLUCOSE BLDC GLUCOMTR-MCNC: 209 MG/DL (ref 70–130)
GLUCOSE BLDC GLUCOMTR-MCNC: 96 MG/DL (ref 70–130)
GLUCOSE SERPL-MCNC: 106 MG/DL (ref 65–99)
HCT VFR BLD AUTO: 29.2 % (ref 34–46.6)
HGB BLD-MCNC: 9.1 G/DL (ref 12–15.9)
IMM GRANULOCYTES # BLD AUTO: 0.07 10*3/MM3 (ref 0–0.05)
IMM GRANULOCYTES NFR BLD AUTO: 0.6 % (ref 0–0.5)
LYMPHOCYTES # BLD AUTO: 1.01 10*3/MM3 (ref 0.7–3.1)
LYMPHOCYTES NFR BLD AUTO: 8 % (ref 19.6–45.3)
MCH RBC QN AUTO: 26.5 PG (ref 26.6–33)
MCHC RBC AUTO-ENTMCNC: 31.2 G/DL (ref 31.5–35.7)
MCV RBC AUTO: 85.1 FL (ref 79–97)
MONOCYTES # BLD AUTO: 1.26 10*3/MM3 (ref 0.1–0.9)
MONOCYTES NFR BLD AUTO: 10 % (ref 5–12)
NEUTROPHILS NFR BLD AUTO: 10.26 10*3/MM3 (ref 1.7–7)
NEUTROPHILS NFR BLD AUTO: 81.1 % (ref 42.7–76)
NRBC BLD AUTO-RTO: 0 /100 WBC (ref 0–0.2)
PLATELET # BLD AUTO: 245 10*3/MM3 (ref 140–450)
PMV BLD AUTO: 9.8 FL (ref 6–12)
POTASSIUM SERPL-SCNC: 4.5 MMOL/L (ref 3.5–5.2)
QT INTERVAL: 418 MS
RBC # BLD AUTO: 3.43 10*6/MM3 (ref 3.77–5.28)
SODIUM SERPL-SCNC: 127 MMOL/L (ref 136–145)
WBC NRBC COR # BLD: 12.64 10*3/MM3 (ref 3.4–10.8)

## 2023-02-06 PROCEDURE — 96372 THER/PROPH/DIAG INJ SC/IM: CPT

## 2023-02-06 PROCEDURE — 85025 COMPLETE CBC W/AUTO DIFF WBC: CPT | Performed by: STUDENT IN AN ORGANIZED HEALTH CARE EDUCATION/TRAINING PROGRAM

## 2023-02-06 PROCEDURE — 63710000001 ONDANSETRON PER 8 MG: Performed by: STUDENT IN AN ORGANIZED HEALTH CARE EDUCATION/TRAINING PROGRAM

## 2023-02-06 PROCEDURE — 80048 BASIC METABOLIC PNL TOTAL CA: CPT | Performed by: STUDENT IN AN ORGANIZED HEALTH CARE EDUCATION/TRAINING PROGRAM

## 2023-02-06 PROCEDURE — G0378 HOSPITAL OBSERVATION PER HR: HCPCS

## 2023-02-06 PROCEDURE — 82962 GLUCOSE BLOOD TEST: CPT

## 2023-02-06 PROCEDURE — 25010000002 HEPARIN (PORCINE) PER 1000 UNITS: Performed by: STUDENT IN AN ORGANIZED HEALTH CARE EDUCATION/TRAINING PROGRAM

## 2023-02-06 PROCEDURE — 63710000001 INSULIN LISPRO (HUMAN) PER 5 UNITS: Performed by: INTERNAL MEDICINE

## 2023-02-06 PROCEDURE — 97530 THERAPEUTIC ACTIVITIES: CPT

## 2023-02-06 RX ADMIN — FOLIC ACID 1 MG: 1 TABLET ORAL at 10:31

## 2023-02-06 RX ADMIN — PANTOPRAZOLE SODIUM 40 MG: 40 TABLET, DELAYED RELEASE ORAL at 10:31

## 2023-02-06 RX ADMIN — ONDANSETRON HYDROCHLORIDE 4 MG: 4 TABLET, FILM COATED ORAL at 06:21

## 2023-02-06 RX ADMIN — HEPARIN SODIUM 5000 UNITS: 5000 INJECTION INTRAVENOUS; SUBCUTANEOUS at 02:54

## 2023-02-06 RX ADMIN — LEVOFLOXACIN 250 MG: 250 TABLET, FILM COATED ORAL at 16:08

## 2023-02-06 RX ADMIN — SERTRALINE 50 MG: 50 TABLET, FILM COATED ORAL at 09:42

## 2023-02-06 RX ADMIN — CARVEDILOL 6.25 MG: 6.25 TABLET, FILM COATED ORAL at 18:17

## 2023-02-06 RX ADMIN — LEVETIRACETAM 250 MG: 250 TABLET, FILM COATED ORAL at 08:15

## 2023-02-06 RX ADMIN — INSULIN LISPRO 2 UNITS: 100 INJECTION, SOLUTION INTRAVENOUS; SUBCUTANEOUS at 18:16

## 2023-02-06 RX ADMIN — Medication 10 ML: at 08:21

## 2023-02-06 RX ADMIN — ROPINIROLE 2 MG: 2 TABLET, FILM COATED ORAL at 20:01

## 2023-02-06 RX ADMIN — LEVOTHYROXINE SODIUM 300 MCG: 0.15 TABLET ORAL at 05:47

## 2023-02-06 RX ADMIN — ACETAMINOPHEN 650 MG: 325 TABLET, FILM COATED ORAL at 02:59

## 2023-02-06 RX ADMIN — DOXYCYCLINE 100 MG: 100 CAPSULE ORAL at 20:01

## 2023-02-06 RX ADMIN — AMLODIPINE BESYLATE 10 MG: 10 TABLET ORAL at 08:11

## 2023-02-06 RX ADMIN — LEVETIRACETAM 250 MG: 250 TABLET, FILM COATED ORAL at 20:01

## 2023-02-06 RX ADMIN — ASPIRIN 81 MG: 81 TABLET, COATED ORAL at 10:31

## 2023-02-06 RX ADMIN — DOXYCYCLINE 100 MG: 100 CAPSULE ORAL at 09:42

## 2023-02-06 RX ADMIN — CARVEDILOL 6.25 MG: 6.25 TABLET, FILM COATED ORAL at 08:11

## 2023-02-06 RX ADMIN — ONDANSETRON HYDROCHLORIDE 4 MG: 4 TABLET, FILM COATED ORAL at 20:10

## 2023-02-06 RX ADMIN — LORAZEPAM 0.5 MG: 0.5 TABLET ORAL at 19:54

## 2023-02-06 RX ADMIN — LISINOPRIL 20 MG: 20 TABLET ORAL at 20:01

## 2023-02-06 RX ADMIN — HEPARIN SODIUM 5000 UNITS: 5000 INJECTION INTRAVENOUS; SUBCUTANEOUS at 18:16

## 2023-02-06 RX ADMIN — HEPARIN SODIUM 5000 UNITS: 5000 INJECTION INTRAVENOUS; SUBCUTANEOUS at 10:31

## 2023-02-06 RX ADMIN — ACETAMINOPHEN 650 MG: 325 TABLET, FILM COATED ORAL at 22:33

## 2023-02-06 RX ADMIN — INSULIN GLARGINE-YFGN 3 UNITS: 100 INJECTION, SOLUTION SUBCUTANEOUS at 22:13

## 2023-02-06 RX ADMIN — DESVENLAFAXINE SUCCINATE 25 MG: 25 TABLET, EXTENDED RELEASE ORAL at 09:42

## 2023-02-06 NOTE — PLAN OF CARE
Goal Outcome Evaluation:  Plan of Care Reviewed With: patient        Progress: no change  Outcome Evaluation: alert, cooperative, affect flat and wanting only to sleep.  appetite poor.  glucose 96 and 122. no SSI required.  up to chair with much encouragement.  bed/chair alarm in use for safety.  afebrile.  occasional NPC.  no SOA or CP reported.  bed rails padded.  contact isolation observed.  subclavian dialysis catheter site clean with dressing current and DI.  lumens taped off with gauze.

## 2023-02-06 NOTE — PROGRESS NOTES
[facility] Progress Note      Patient Name: Zaina Martinez  : 1965  MRN: [id number]  Primary Care Physician:  Provider, No Known  Date of admission: 2023    Subjective     Interval History:   F/u ESRD     Review of Systems:   C/o having BM while in chair  No dyspnea     Objective     Vitals:   Temp:  [98 °F (36.7 °C)-99 °F (37.2 °C)] 98 °F (36.7 °C)  Heart Rate:  [74-81] 74  Resp:  [16] 16  BP: (142-157)/(70-86) 150/72    Intake/Output Summary (Last 24 hours) at 2023 1253  Last data filed at 2023 1110  Gross per 24 hour   Intake 470 ml   Output --   Net 470 ml       Physical Exam:    General Appearance: frail cachectic tearful WF in chair  Neck: supple, no JVD  Lungs: CTA bilat   Heart: RRR, normal S1 and S2  Abdomen: soft, nontender, nondistended  Extremities: no edema, cyanosis or clubbing      Scheduled Meds:     amLODIPine, 10 mg, Oral, Q24H  aspirin, 81 mg, Oral, Daily  carvedilol, 6.25 mg, Oral, BID With Meals  desvenlafaxine, 25 mg, Oral, Daily  doxycycline, 100 mg, Oral, Q12H  folic acid, 1 mg, Oral, Daily  heparin (porcine), 5,000 Units, Subcutaneous, Q8H  insulin glargine, 3 Units, Subcutaneous, Nightly  insulin lispro, 0-9 Units, Subcutaneous, TID With Meals  lactulose, 10 g, Oral, BID  levETIRAcetam, 250 mg, Oral, BID  levoFLOXacin, 250 mg, Oral, Q24H  levothyroxine, 300 mcg, Oral, Q AM  lidocaine, 1 patch, Transdermal, Q24H  lisinopril, 20 mg, Oral, Nightly  pantoprazole, 40 mg, Oral, Daily  rOPINIRole, 2 mg, Oral, Nightly  sertraline, 50 mg, Oral, Daily      IV Meds:        Results Reviewed:   I have personally reviewed the results from the time of this admission to 2023 12:53 EST     Results from last 7 days   Lab Units 23  0504 23  1352 23  0626 23  0723 23  1109   SODIUM mmol/L 127* 131* 128*   < > 133*   POTASSIUM mmol/L 4.5 4.3 4.2   < > 3.7   CHLORIDE mmol/L 95* 97* 95*   < > 96*   CO2 mmol/L 22.0 20.1* 21.0*   < >  26.0   BUN mg/dL 16 14 21*   < > 25*   CREATININE mg/dL 2.94* 2.68* 2.59*   < > 3.13*   CALCIUM mg/dL 8.3* 8.3* 8.2*   < > 8.2*   BILIRUBIN mg/dL  --  0.5  --   --  0.6   ALK PHOS U/L  --  176*  --   --  226*   ALT (SGPT) U/L  --  <5  --   --  <5   AST (SGOT) U/L  --  13  --   --  12   GLUCOSE mg/dL 106* 116* 150*   < > 190*    < > = values in this interval not displayed.     Estimated Creatinine Clearance: 19.7 mL/min (A) (by C-G formula based on SCr of 2.94 mg/dL (H)).  Results from last 7 days   Lab Units 02/04/23  0626 02/03/23  0723 02/01/23  1109   PHOSPHORUS mg/dL 2.0* 2.0* 2.9         Results from last 7 days   Lab Units 02/06/23  0504 02/05/23  1352 02/04/23  0626 02/03/23  0723 02/01/23  1109   WBC 10*3/mm3 12.64* 15.62* 11.81* 12.10* 14.01*   HEMOGLOBIN g/dL 9.1* 9.8* 9.7* 10.5* 11.2*   PLATELETS 10*3/mm3 245 257 275 264 330           Assessment / Plan     ASSESSMENT:  -End-stage renal disease and diabetic and hypertensive glomerulosclerosis on maintenance hemodialysis TTS via [facility] TDC.  Last HD SAT, 3L removed.  Volume stable.  K 4.5 and Na 127 today  -Severe weakness prompting admission - plan for rehab placement  -Diabetes mellitus type 2 with renal complete  -Hypertension with chronic kidney disease uncontrolled - ACEi & coreg inc'd yesterday  -Hypothyroidism on replacement therapy  -Anemia of chronic kidney disease hemoglobin yesterday 9.7 on long-acting ANDRAE as an outpatient  -hypophosphatemia - mild, phos 2.0, due to poor intake, not on binder , removed renal diet restrictions  -Depression - access following     PLAN:  HD tomorrow  Rehab placement in progress, reviewed CCP notes   No labs needed tomorrow from my standpoint  D/W RN    Frederic Powell, MD  02/06/23  12:53 EST    [facility]  [phone]

## 2023-02-06 NOTE — THERAPY TREATMENT NOTE
Patient Name: Zaina Martinez  : 1965    MRN: [id number]                              Today's Date: 2023       Admit Date: 2023    Visit Dx:     ICD-10-CM ICD-9-CM   1. Pyuria  R82.81 791.9   2. Weakness  R53.1 780.79     Patient Active Problem List   Diagnosis   • Renal insufficiency   • Hypertensive disorder   • Hypothyroidism   • Type 2 diabetes mellitus with kidney complication, with long-term current use of insulin ([facility])   • Rheumatoid arthritis ([facility])   • Angioedema   • Esophageal dysmotility   • Anemia   • Medically noncompliant   • Myocardial infarction due to demand ischemia ([facility])   • Enteritis   • PRES (posterior reversible encephalopathy syndrome)   • Urine retention   • Klebsiella infection   • Superficial thrombophlebitis   • Generalized weakness   • ESRD (end stage renal disease) ([facility])   • CAD (coronary artery disease)   • Abnormal urinalysis   • Chronic diastolic CHF (congestive heart failure) ([facility])   • Pyelonephritis   • Calculus of gallbladder with acute on chronic cholecystitis without obstruction   • Pleural effusion on right   • Anemia due to chronic kidney disease, on chronic dialysis ([facility])   • Abnormal findings on diagnostic imaging of other specified body structures   • Acute upper respiratory infection   • Agitation   • Alkaline phosphatase raised   • Casts present in urine   • Cellulitis of toe   • Hip pain   • Community acquired pneumonia   • Depressive disorder   • Diarrhea of presumed infectious origin   • Difficult or painful urination   • Disease due to severe acute respiratory syndrome coronavirus 2 (SARS-CoV-2)   • Dyspnea   • Encounter for follow-up examination after completed treatment for conditions other than malignant neoplasm   • H/O: hypothyroidism   • Hyperlipidemia   • Hypomagnesemia   • Intractable vomiting with nausea   • Luetscher's syndrome   • Need for influenza vaccination   • Restless legs   • Noncompliance with treatment   • Shoulder pain   • Acute UTI  (urinary tract infection)   • Metabolic encephalopathy   • Abnormal findings on diagnostic imaging of abdomen   • Status post cholecystectomy   • Hyponatremia   • Acute metabolic encephalopathy   • Encephalopathy, toxic   • Acute CVA (cerebrovascular accident) (HCC)   • History of intracranial hemorrhage   • Stroke (HCC)   • Abnormal urinalysis   • Diabetic muscle infarction (HCC)   • Other insomnia   • Altered mental status   • History of stroke- R MCA s/p TPA with subsequent ICH with debility   • Heart murmur   • Bradycardia   • Left lower lobe pneumonia   • Metabolic encephalopathy   • Pericardial effusion   • Pleural effusion   • Acute pulmonary edema (HCC)   • Atrial flutter (HCC)   • Chronic systolic CHF (congestive heart failure) (HCC)   • Thrombus of venous dialysis catheter (HCC)   • Sepsis without acute organ dysfunction (HCC)   • Type 2 diabetes mellitus with hyperglycemia, with long-term current use of insulin (HCC)   • Acute respiratory failure with hypoxia (HCC)   • Acute on chronic systolic CHF (congestive heart failure) (HCC)   • Hyperkalemia   • Acute respiratory failure with hypoxia (HCC)   • Other hypervolemia   • Hematoma of right hip, initial encounter   • Ureteral stent present   • Closed intertrochanteric fracture of right femur (HCC)   • Witnessed seizure-like activity (HCC)   • Acute respiratory failure with hypercapnia (HCC)   • Opioid use   • Diabetic muscle infarction (HCC)   • Acute posthemorrhagic anemia   • Kidney hematoma   • Pyuria   • Severe malnutrition (HCC)     Past Medical History:   Diagnosis Date   • Acute CVA (cerebrovascular accident) (HCC) 05/01/2022   • Acute on chronic diastolic CHF (congestive heart failure) (HCC)    • Anemia    • CAD (coronary artery disease) 12/20/2021   • Diabetes (HCC)    • Disease of thyroid gland    • GERD (gastroesophageal reflux disease)    • History of intracranial hemorrhage 5/1/2022   • Hyperlipidemia 11/30/2018   • Hypertension    •  Rheumatoid arthritis (HCC)    • Stage 5 chronic kidney disease (HCC)      Past Surgical History:   Procedure Laterality Date   • CHOLECYSTECTOMY WITH INTRAOPERATIVE CHOLANGIOGRAM N/A 1/10/2022    Procedure: Laparoscopic cholecystectomy with intraoperative cholangiogram;  Surgeon: Ramana Raygoza MD;  Location: [facility];  Service: General;  Laterality: N/A;   • CYSTOSCOPY W/ URETERAL STENT PLACEMENT Left 9/29/2022    Procedure: CYSTOSCOPY URETERAL STENT INSERTION WITH RETROGRADE PYELOGRAM;  Surgeon: Bryant Phan Jr., MD;  Location: [facility] OR;  Service: Urology;  Laterality: Left;   • CYSTOSCOPY W/ URETERAL STENT PLACEMENT Left 1/10/2023    Procedure: CYSTOSCOPY LEFT STENT REMOVAL;  Surgeon: Bryant Phan Jr., MD;  Location: [facility] OR;  Service: Urology;  Laterality: Left;   • EMBOLIZATION MESENTERIC ARTERY N/A 1/1/2023    Procedure: LEFT KIDNEY EMBOLIZATION;  Surgeon: Bijan Ordoñez MD;  [facility];  Service: Interventional Radiology;  Laterality: N/A;   • EYE SURGERY     • FEMUR IM NAILING/RODDING Right 11/10/2022    Procedure: FEMUR INTRAMEDULLARY NAILING/RODDING;  Surgeon: Glen Pierce MD;  Location: [facility] OR;  Service: Orthopedics;  Laterality: Right;   • HIP INTERTROCHANTERIC NAILING Right 11/10/2022    Procedure: HIP INTERTROCHANTERIC NAILING;  Surgeon: Glen Pierce MD;  Location: [facility] OR;  Service: Orthopedics;  Laterality: Right;   • HYSTERECTOMY     • INSERT CENTRAL LINE AT BEDSIDE  12/31/2022        • INSERTION HEMODIALYSIS CATHETER N/A 12/6/2021    Procedure: HEMODIALYSIS CATHETER INSERTION;  Surgeon: Keli Salazar MD;  [facility];  Service: Vascular;  Laterality: N/A;   • INSERTION HEMODIALYSIS CATHETER N/A 5/3/2022    Procedure: TUNNELED CATHETER PLACEMENT;  Surgeon: Keli Salazar MD;  Location: [facility] OR;  Service: Vascular;  Laterality: N/A;   • MUSCLE BIOPSY Left  6/15/2022    Procedure: Left quadriceps muscle biopsy;  Surgeon: Marques Ma MD;  Location: [facility];  Service: Neurosurgery;  Laterality: Left;   • URETEROSCOPY LASER LITHOTRIPSY WITH STENT INSERTION Left 12/30/2022    Procedure: CYSTOSCOPY WITH LEFT  URETEROSCOPY  LEFT STENT EXCHANGE;  Surgeon: Bryant Phan Jr., MD;  Location: [facility];  Service: Urology;  Laterality: Left;      General Information     Row Name 02/06/23 1403          Physical Therapy Time and Intention    Document Type therapy note (daily note)  -CB     Mode of Treatment individual therapy;physical therapy  -CB     Row Name 02/06/23 1403          General Information    Existing Precautions/Restrictions fall  -CB     Row Name 02/06/23 1403          Safety Issues, Functional Mobility    Safety Issues Affecting Function (Mobility) insight into deficits/self-awareness;awareness of need for assistance;judgment;problem-solving  -CB           User Key  (r) = Recorded By, (t) = Taken By, (c) = Cosigned By    Initials Name Provider Type    CB Zainab Cobos PT Physical Therapist               Mobility     Row Name 02/06/23 1403          Bed Mobility    Bed Mobility sit-supine;scooting/bridging;rolling left;rolling right  -CB     Rolling Left [place] (Bed Mobility) minimum assist (75% patient effort);verbal cues  -CB     Rolling Right [place] (Bed Mobility) minimum assist (75% patient effort);verbal cues  -CB     Scooting/Bridging [place] (Bed Mobility) dependent (less than 25% patient effort);2 person assist;verbal cues  -CB     Sit-Supine [place] (Bed Mobility) moderate assist (50% patient effort);verbal cues  -CB     Assistive Device (Bed Mobility) head of bed elevated;draw sheet;bed rails;leg   -CB     Row Name 02/06/23 1403          Bed-Chair Transfer    Bed-Chair [place] (Transfers) maximum assist (25% patient effort);verbal cues;nonverbal cues (demo/gesture)  -CB     Assistive Device (Bed-Chair  Transfers) --  HHA  -CB     Comment, (Bed-Chair Transfer) max cues for LE sequencing  -CB     Row Name 02/06/23 1403          Sit-Stand Transfer    Sit-Stand [place] (Transfers) moderate assist (50% patient effort);maximum assist (25% patient effort);verbal cues  -CB     Assistive Device (Sit-Stand Transfers) --  HHA  -CB           User Key  (r) = Recorded By, (t) = Taken By, (c) = Cosigned By    Initials Name Provider Type    Zainab Virgen PT Physical Therapist               Obj/Interventions     Row Name 02/06/23 1405          Motor Skills    Therapeutic Exercise --  AP and SLR x10 reps  -CB     Row Name 02/06/23 1405          Balance    Balance Assessment standing static balance;sitting static balance;standing dynamic balance;sit to stand dynamic balance  -CB     Static Sitting Balance contact guard  -CB     Position, Sitting Balance sitting edge of bed  -CB     Sit to Stand Dynamic Balance moderate assist;maximum assist;2-person assist  -CB     Static Standing Balance moderate assist;verbal cues  -CB     Dynamic Standing Balance moderate assist;maximum assist;verbal cues  -CB     Position/Device Used, Standing Balance supported  -CB     Balance Interventions sitting;standing;sit to stand;supported;static;dynamic;minimal challenge  -CB           User Key  (r) = Recorded By, (t) = Taken By, (c) = Cosigned By    Initials Name Provider Type    Zainab Virgen, PT Physical Therapist               Goals/Plan    No documentation.                Clinical Impression     Row Name 02/06/23 1406          Pain    Pretreatment Pain Rating 0/10 - no pain  -CB     Posttreatment Pain Rating 0/10 - no pain  -CB     Row Name 02/06/23 1406          Plan of Care Review    Plan of Care Reviewed With patient  -CB     Progress no change  -CB     Outcome Evaluation Patient participated with PT this afternoon. PT assisted pt back to bed. She required mod-maxA for STS from chair and maxA for chair to bed tx. Cues for LE  sequencing, upright posture, and UE placement during transfers today. Pt will continue to benefit from skilled PT to address functional deficits.  -CB     Row Name 02/06/23 1406          Positioning and Restraints    Pre-Treatment Position sitting in chair/recliner  -CB     Post Treatment Position bed  -CB     In Bed notified nsg;fowlers;call light within reach;encouraged to call for assist;exit alarm on;side rails up x3  -CB           User Key  (r) = Recorded By, (t) = Taken By, (c) = Cosigned By    Initials Name Provider Type    Zainab Virgen, VICKIE Physical Therapist               Outcome Measures     Row Name 02/06/23 1409 02/06/23 0809       How much help from another person do you currently need...    Turning from your back to your side while in flat bed without using bedrails? 3  -CB 3  -CH    Moving from lying on back to sitting on the side of a flat bed without bedrails? 2  -CB 3  -CH    Moving to and from a bed to a chair (including a wheelchair)? 1  -CB 1  -CH    Standing up from a chair using your arms (e.g., wheelchair, bedside chair)? 1  -CB 1  -CH    Climbing 3-5 steps with a railing? 1  -CB 1  -CH    To walk in hospital room? 1  -CB 1  -CH    AM-PAC 6 Clicks Score (PT) 9  -CB 10  -CH    Highest level of mobility 3 --> Sat at edge of bed  -CB 4 --> Transferred to chair/commode  -CH    Row Name 02/06/23 1409          Functional Assessment    Outcome Measure Options AM-PAC 6 Clicks Basic Mobility (PT)  -CB           User Key  (r) = Recorded By, (t) = Taken By, (c) = Cosigned By    Initials Name Provider Type     Samantha Sanchez, RN Registered Nurse    Zainab Virgen, VICKIE Physical Therapist                             Physical Therapy Education     Title: PT OT SLP Therapies (In Progress)     Topic: Physical Therapy (Done)     Point: Mobility training (Done)     Learning Progress Summary           Patient Acceptance, E,TB, VU,NR by CB at 2/6/2023 1409    Acceptance, E,D, DU by PC at 2/2/2023  1409                   Point: Home exercise program (Done)     Learning Progress Summary           Patient Acceptance, E,TB, VU,NR by CB at 2/6/2023 1409    Acceptance, E,D, DU by PC at 2/2/2023 1409                   Point: Body mechanics (Done)     Learning Progress Summary           Patient Acceptance, E,TB, VU,NR by CB at 2/6/2023 1409    Acceptance, E,D, DU by PC at 2/2/2023 1409                   Point: Precautions (Done)     Learning Progress Summary           Patient Acceptance, E,TB, VU,NR by CB at 2/6/2023 1409    Acceptance, E, VU by MD at 2/3/2023 1130    Acceptance, E,D, DU by PC at 2/2/2023 1409                               User Key     Initials Effective Dates Name Provider Type Discipline    PC 06/16/21 -  Cristela Perez, PT Physical Therapist PT    MD 06/16/21 -  Jeannette Easton, PT Physical Therapist PT    CB 10/22/21 -  Zainab Cobos, VICKIE Physical Therapist PT              PT Recommendation and Plan     Plan of Care Reviewed With: patient  Progress: no change  Outcome Evaluation: Patient participated with PT this afternoon. PT assisted pt back to bed. She required mod-maxA for STS from chair and maxA for chair to bed tx. Cues for LE sequencing, upright posture, and UE placement during transfers today. Pt will continue to benefit from skilled PT to address functional deficits.     Time Calculation:    PT Charges     Row Name 02/06/23 1410             Time Calculation    Start Time 1345  -CB      Stop Time 1359  -CB      Time Calculation (min) 14 min  -CB      PT Received On 02/06/23  -CB      PT - Next Appointment 02/07/23  -CB         Time Calculation- PT    Total Timed Code Minutes- PT 14 minute(s)  -CB         Timed Charges    85638 - PT Therapeutic Activity Minutes 14  -CB         Total Minutes    Timed Charges Total Minutes 14  -CB       Total Minutes 14  -CB            User Key  (r) = Recorded By, (t) = Taken By, (c) = Cosigned By    Initials Name Provider Type    CB Zainab Cobos, PT Physical  Therapist              Therapy Charges for Today     Code Description Service Date Service Provider Modifiers Qty    [id number]  PT THERAPEUTIC ACT EA 15 MIN 2/6/2023 Zaniab Cobos, PT GP 1          PT G-Codes  Outcome Measure Options: AM-PAC 6 Clicks Basic Mobility (PT)  AM-PAC 6 Clicks Score (PT): 9  AM-PAC 6 Clicks Score (OT): 11  PT Discharge Summary  Anticipated Discharge Disposition (PT): skilled nursing facility    Zainab Cobos, PT  2/6/2023

## 2023-02-06 NOTE — PROGRESS NOTES
Name: Zaina Martinez ADMIT: 2023   : 1965  PCP: Provider, No Known    MRN: [id number] LOS: 1 days   AGE/SEX: 57 y.o. female  ROOM: [facility]/     Subjective   Subjective   In chair when seen. +BM. Tearful. Denies pain. Appetite fair. D/W RN, no new nursing concerns    Review of Systems   Constitutional: Negative for fever.   HENT: Negative for congestion.    Respiratory: Negative for shortness of breath.    Cardiovascular: Negative for chest pain.   Gastrointestinal: Negative for constipation.   Genitourinary: Negative for flank pain.   Musculoskeletal: Negative for arthralgias.   Skin: Negative for rash.   Neurological: Negative for headaches.   Psychiatric/Behavioral: Negative for sleep disturbance.        Objective   Objective   Vital Signs  Temp:  [97.3 °F (36.3 °C)-99 °F (37.2 °C)] 97.3 °F (36.3 °C)  Heart Rate:  [74-81] 79  Resp:  [16] 16  BP: (142-157)/(70-86) 153/78  SpO2:  [95 %-98 %] 95 %  on   ;   Device (Oxygen Therapy): room air  Body mass index is 23.87 kg/m².  Physical Exam  Vitals and nursing note reviewed.   Constitutional:       General: She is not in acute distress.     Appearance: She is ill-appearing.   HENT:      Head: Normocephalic.      Mouth/Throat:      Mouth: Mucous membranes are moist.   Eyes:      Conjunctiva/sclera: Conjunctivae normal.   Cardiovascular:      Rate and Rhythm: Normal rate and regular rhythm.   Pulmonary:      Effort: Pulmonary effort is normal. No respiratory distress.      Breath sounds: No wheezing or rales.   Abdominal:      General: Bowel sounds are normal.      Palpations: Abdomen is soft.   Musculoskeletal:      Cervical back: Neck supple.      Right lower leg: No edema.      Left lower leg: No edema.   Skin:     General: Skin is warm and dry.   Neurological:      Mental Status: She is alert. Mental status is at baseline.   Psychiatric:         Mood and Affect: Affect is flat and tearful.       Results Review     I reviewed the patient's new clinical  results.  Results from last 7 days   Lab Units 02/06/23  0504 02/05/23 1352 02/04/23 0626 02/03/23  0723   WBC 10*3/mm3 12.64* 15.62* 11.81* 12.10*   HEMOGLOBIN g/dL 9.1* 9.8* 9.7* 10.5*   PLATELETS 10*3/mm3 245 257 275 264     Results from last 7 days   Lab Units 02/06/23  0504 02/05/23 1352 02/04/23 0626 02/03/23  0723   SODIUM mmol/L 127* 131* 128* 132*   POTASSIUM mmol/L 4.5 4.3 4.2 3.8   CHLORIDE mmol/L 95* 97* 95* 99   CO2 mmol/L 22.0 20.1* 21.0* 19.9*   BUN mg/dL 16 14 21* 14   CREATININE mg/dL 2.94* 2.68* 2.59* 2.11*   GLUCOSE mg/dL 106* 116* 150* 169*   EGFR mL/min/1.73 18.1* 20.2* 21.0* 26.9*     Results from last 7 days   Lab Units 02/05/23  1352 02/04/23 0626 02/03/23 0723 02/01/23  1109   ALBUMIN g/dL 2.7* 3.0* 2.9* 2.9*   BILIRUBIN mg/dL 0.5  --   --  0.6   ALK PHOS U/L 176*  --   --  226*   AST (SGOT) U/L 13  --   --  12   ALT (SGPT) U/L <5  --   --  <5     Results from last 7 days   Lab Units 02/06/23  0504 02/05/23 1352 02/04/23 0626 02/03/23 0723 02/01/23  1109   CALCIUM mg/dL 8.3* 8.3* 8.2* 8.2* 8.2*   ALBUMIN g/dL  --  2.7* 3.0* 2.9* 2.9*   PHOSPHORUS mg/dL  --   --  2.0* 2.0* 2.9     Results from last 7 days   Lab Units 02/05/23 1352 02/01/23  1109   PROCALCITONIN ng/mL 0.68*  --    LACTATE mmol/L  --  0.9     Glucose   Date/Time Value Ref Range Status   02/06/2023 1126 122 70 - 130 mg/dL Final     Comment:     Meter: [id number] : [id number] Jose Elizabeth REGINALDO   02/06/2023 0608 96 70 - 130 mg/dL Final     Comment:     Meter: [id number] : [id number] Cyril SAAVEDRA   02/05/2023 1953 133 (H) 70 - 130 mg/dL Final     Comment:     Meter: [id number] : [id number] Cyril SAAVEDRA   02/05/2023 1623 106 70 - 130 mg/dL Final     Comment:     Meter: [id number] : [id number] Monico SAAVEDRA   02/05/2023 1135 102 70 - 130 mg/dL Final     Comment:     Meter: [id number] : [id number] Monico SAAVEDRA   02/05/2023 0510 90 70 - 130 mg/dL Final     Comment:     Meter:  [id number] : [id number] Silvano SAAVEDRA   02/04/2023 2117 98 70 - 130 mg/dL Final     Comment:     Meter: [id number] : [id number] Hawa SAAVEDRA       XR Chest 1 View    Result Date: 2/5/2023   Stable findings when compared to the prior examination dated 02/01/2023. Again noted are areas of atelectatic change or pneumonia within the left base in addition to a small-to-moderate size left pleural effusion. The cardiomediastinal silhouette is enlarged but stable in size.  A right IJ central venous catheter is unchanged in position.  This report was finalized on 2/5/2023 4:08 PM by Dr. Lucas Henriquez M.D.      I have personally reviewed all medications:  Scheduled Medications  amLODIPine, 10 mg, Oral, Q24H  aspirin, 81 mg, Oral, Daily  carvedilol, 6.25 mg, Oral, BID With Meals  desvenlafaxine, 25 mg, Oral, Daily  doxycycline, 100 mg, Oral, Q12H  folic acid, 1 mg, Oral, Daily  heparin (porcine), 5,000 Units, Subcutaneous, Q8H  insulin glargine, 3 Units, Subcutaneous, Nightly  insulin lispro, 0-9 Units, Subcutaneous, TID With Meals  lactulose, 10 g, Oral, BID  levETIRAcetam, 250 mg, Oral, BID  levoFLOXacin, 250 mg, Oral, Q24H  levothyroxine, 300 mcg, Oral, Q AM  lidocaine, 1 patch, Transdermal, Q24H  lisinopril, 20 mg, Oral, Nightly  pantoprazole, 40 mg, Oral, Daily  rOPINIRole, 2 mg, Oral, Nightly  sertraline, 50 mg, Oral, Daily    Infusions   Diet  Diet: Regular/House Diet; Texture: Regular Texture (IDDSI 7); Fluid Consistency: Thin (IDDSI 0)    I have personally reviewed:  [x]  Laboratory   [x]  Microbiology   [x]  Radiology   [x]  EKG/Telemetry  [x]  Cardiology/Vascular   [x]  Pathology    [x]  Records       Assessment/Plan     Active Hospital Problems    Diagnosis  POA   • **Generalized weakness [R53.1]  Yes   • Severe malnutrition (HCC) [E43]  Yes   • Pyuria [R82.81]  Yes   • Chronic systolic CHF (congestive heart failure) (HCC) [I50.22]  Yes   • History of stroke- R MCA s/p TPA with subsequent ICH with  debility [Z86.73]  Not Applicable   • ESRD (end stage renal disease) ([facility]) [N18.6]  Yes   • Hypothyroidism [E03.9]  Yes   • Hypertensive disorder [I10]  Yes   • Type 2 diabetes mellitus with kidney complication, with long-term current use of insulin ([facility]) [E11.29, Z79.4]  Not Applicable      Resolved Hospital Problems   No resolved problems to display.       57 y.o. female admitted with Generalized weakness.    • Low grade fever 2/5 with elevated WBC, and concern for LLL pneumonia on CXR. RVP neg.  Her procalcitonin is a little bit elevated, but this may be due to her renal disease. Prescribed doxycycline and renally dose levaquin. +MRSA screen. BC pending. Continue same for now  • Generalized weakness-this is the patient's 18th admission in 13 months. She has refused rehab in the past, but is agreeable this time and her  cannot care for her at home. CCP is working on getting her set up with a nursing facility  • Pyuria-asymptomatic. ID recommends monitoring off abx  • ESRD on HD-nephrology is consulted for routine dialysis  • Chronic systolic heart failure with recovered EF-on coreg. Started on lisinopril this admission. Cannot tolerate SGLT2i due to her ESRD   • Type 2 diabetes on insulin-a1c 6.2. glucose is adequately controlled. Continue home regimen and sliding scale  • Hypertension-adequate control acutely  • Hypothyroidism-synthroid. She did have trouble correcting her TSH despite supplementation for a month during her prior hospitalization and required IV synthroid. Free t4 here is normal  • Rheumatoid arthritis-previously on chronic steroids, but these appear to have been stopped several months ago. AM cortisol was normal  • Paroxysmal afib-not a candidate for AC given recent hemorrhagic shock. On asa and coreg  • Seizure disorder-keppra  • GERD-ppi  • History of CVA  • Recent right femur fracture  • History of left renal hematoma causing life-threatening hemorrhagic shock s/p left renal artery  embolization on 1/1/23-no longer a candidate for AC  Anxiety-prn ativan. Depression: pristiq.  Psychiatry is following    · Heparin SC for DVT prophylaxis.  · Full code.  · Discussed with patient and nursing staff.  · Anticipate discharge to SNU facility once arrangements have been made.      RIKA William  [facility]  02/06/23  14:09 EST

## 2023-02-06 NOTE — PROGRESS NOTES
Continued Stay Note  [facility]     Patient Name: Zaina Martinez  MRN: [id number]  Today's Date: 2/6/2023    Admit Date: 2/1/2023    Plan: SNF with HD (Referrls in EPIC/Pending)   Discharge Plan     Row Name 02/06/23 1315       Plan    Plan SNF with HD (Referrls in EPIC/Pending)    Plan Comments Spoke to patient at bedside who stated she believed her spouse did tour [place] and like it. Msg sent to Gina with [facility] regading tour.               Discharge Codes    No documentation.               Expected Discharge Date and Time     Expected Discharge Date Expected Discharge Time    Feb 9, 2023             Liset Beckman RN

## 2023-02-06 NOTE — PROGRESS NOTES
Access did follow-up on patient for anxiety.  Patient was seen by Dr. Burt over the weekend and he reduced her Zoloft and started her on a low dose of Pristiq.  Patient states she was not feeling any unusual side effects.  Patient states she had some anxiety last night but was better this morning.  Access will continue to follow.

## 2023-02-06 NOTE — PLAN OF CARE
Goal Outcome Evaluation:  Plan of Care Reviewed With: patient        Progress: no change  Outcome Evaluation: Patient participated with PT this afternoon. PT assisted pt back to bed. She required mod-maxA for STS from chair and maxA for chair to bed tx. Cues for LE sequencing, upright posture, and UE placement during transfers today. Pt will continue to benefit from skilled PT to address functional deficits.

## 2023-02-06 NOTE — PROGRESS NOTES
The patient has tolerated initiation of Pristiq without complaint and cross titration of this medication and sertraline is now underway.  No suicidal ideation is voiced.

## 2023-02-06 NOTE — PLAN OF CARE
Goal Outcome Evaluation:  Plan of Care Reviewed With: patient        Progress: no change  Outcome Evaluation: VSS, A/O, c/o left epigastric pain, provider notified, labs ordered, elevated troponin, EKG ordered and performed at bedside, no acute findings per provider, Tylenol given for c/o back pain, falls precautions maintained, contact and limb precautions in place, blood sugar monitoring, saline locked

## 2023-02-07 LAB
BASOPHILS # BLD AUTO: 0.03 10*3/MM3 (ref 0–0.2)
BASOPHILS NFR BLD AUTO: 0.2 % (ref 0–1.5)
DEPRECATED RDW RBC AUTO: 49.9 FL (ref 37–54)
EOSINOPHIL # BLD AUTO: 0 10*3/MM3 (ref 0–0.4)
EOSINOPHIL NFR BLD AUTO: 0 % (ref 0.3–6.2)
ERYTHROCYTE [DISTWIDTH] IN BLOOD BY AUTOMATED COUNT: 16.1 % (ref 12.3–15.4)
GLUCOSE BLDC GLUCOMTR-MCNC: 178 MG/DL (ref 70–130)
GLUCOSE BLDC GLUCOMTR-MCNC: 85 MG/DL (ref 70–130)
GLUCOSE BLDC GLUCOMTR-MCNC: 88 MG/DL (ref 70–130)
GLUCOSE BLDC GLUCOMTR-MCNC: 97 MG/DL (ref 70–130)
HCT VFR BLD AUTO: 28 % (ref 34–46.6)
HGB BLD-MCNC: 8.6 G/DL (ref 12–15.9)
IMM GRANULOCYTES # BLD AUTO: 0.04 10*3/MM3 (ref 0–0.05)
IMM GRANULOCYTES NFR BLD AUTO: 0.3 % (ref 0–0.5)
LYMPHOCYTES # BLD AUTO: 0.87 10*3/MM3 (ref 0.7–3.1)
LYMPHOCYTES NFR BLD AUTO: 7.1 % (ref 19.6–45.3)
MCH RBC QN AUTO: 26.1 PG (ref 26.6–33)
MCHC RBC AUTO-ENTMCNC: 30.7 G/DL (ref 31.5–35.7)
MCV RBC AUTO: 85.1 FL (ref 79–97)
MONOCYTES # BLD AUTO: 1.27 10*3/MM3 (ref 0.1–0.9)
MONOCYTES NFR BLD AUTO: 10.3 % (ref 5–12)
NEUTROPHILS NFR BLD AUTO: 10.08 10*3/MM3 (ref 1.7–7)
NEUTROPHILS NFR BLD AUTO: 82.1 % (ref 42.7–76)
NRBC BLD AUTO-RTO: 0 /100 WBC (ref 0–0.2)
PLATELET # BLD AUTO: 231 10*3/MM3 (ref 140–450)
PMV BLD AUTO: 9.6 FL (ref 6–12)
RBC # BLD AUTO: 3.29 10*6/MM3 (ref 3.77–5.28)
WBC NRBC COR # BLD: 12.29 10*3/MM3 (ref 3.4–10.8)

## 2023-02-07 PROCEDURE — G0378 HOSPITAL OBSERVATION PER HR: HCPCS

## 2023-02-07 PROCEDURE — 96372 THER/PROPH/DIAG INJ SC/IM: CPT

## 2023-02-07 PROCEDURE — 82962 GLUCOSE BLOOD TEST: CPT

## 2023-02-07 PROCEDURE — 25010000002 HEPARIN (PORCINE) PER 1000 UNITS: Performed by: HOSPITALIST

## 2023-02-07 PROCEDURE — G0257 UNSCHED DIALYSIS ESRD PT HOS: HCPCS

## 2023-02-07 PROCEDURE — 97530 THERAPEUTIC ACTIVITIES: CPT

## 2023-02-07 PROCEDURE — 25010000002 HEPARIN (PORCINE) PER 1000 UNITS: Performed by: STUDENT IN AN ORGANIZED HEALTH CARE EDUCATION/TRAINING PROGRAM

## 2023-02-07 PROCEDURE — 96376 TX/PRO/DX INJ SAME DRUG ADON: CPT

## 2023-02-07 PROCEDURE — 25010000002 ONDANSETRON PER 1 MG: Performed by: STUDENT IN AN ORGANIZED HEALTH CARE EDUCATION/TRAINING PROGRAM

## 2023-02-07 PROCEDURE — 99214 OFFICE O/P EST MOD 30 MIN: CPT | Performed by: INTERNAL MEDICINE

## 2023-02-07 PROCEDURE — 85025 COMPLETE CBC W/AUTO DIFF WBC: CPT | Performed by: NURSE PRACTITIONER

## 2023-02-07 PROCEDURE — 87040 BLOOD CULTURE FOR BACTERIA: CPT | Performed by: INTERNAL MEDICINE

## 2023-02-07 RX ORDER — HYDROCODONE BITARTRATE AND ACETAMINOPHEN 5; 325 MG/1; MG/1
1 TABLET ORAL ONCE
Status: COMPLETED | OUTPATIENT
Start: 2023-02-07 | End: 2023-02-07

## 2023-02-07 RX ADMIN — HYDROCODONE BITARTRATE AND ACETAMINOPHEN 1 TABLET: 5; 325 TABLET ORAL at 23:29

## 2023-02-07 RX ADMIN — AMLODIPINE BESYLATE 10 MG: 10 TABLET ORAL at 12:56

## 2023-02-07 RX ADMIN — HEPARIN SODIUM 3800 UNITS: 1000 INJECTION INTRAVENOUS; SUBCUTANEOUS at 10:29

## 2023-02-07 RX ADMIN — CARVEDILOL 6.25 MG: 6.25 TABLET, FILM COATED ORAL at 17:11

## 2023-02-07 RX ADMIN — DESVENLAFAXINE SUCCINATE 25 MG: 25 TABLET, EXTENDED RELEASE ORAL at 12:59

## 2023-02-07 RX ADMIN — ROPINIROLE 2 MG: 2 TABLET, FILM COATED ORAL at 20:48

## 2023-02-07 RX ADMIN — PANTOPRAZOLE SODIUM 40 MG: 40 TABLET, DELAYED RELEASE ORAL at 13:01

## 2023-02-07 RX ADMIN — LISINOPRIL 20 MG: 20 TABLET ORAL at 20:48

## 2023-02-07 RX ADMIN — ASPIRIN 81 MG: 81 TABLET, COATED ORAL at 12:57

## 2023-02-07 RX ADMIN — DOXYCYCLINE 100 MG: 100 CAPSULE ORAL at 12:59

## 2023-02-07 RX ADMIN — LEVETIRACETAM 250 MG: 250 TABLET, FILM COATED ORAL at 20:48

## 2023-02-07 RX ADMIN — DOXYCYCLINE 100 MG: 100 CAPSULE ORAL at 20:48

## 2023-02-07 RX ADMIN — ACETAMINOPHEN 650 MG: 325 TABLET, FILM COATED ORAL at 07:39

## 2023-02-07 RX ADMIN — LORAZEPAM 0.5 MG: 0.5 TABLET ORAL at 20:54

## 2023-02-07 RX ADMIN — LORAZEPAM 0.5 MG: 0.5 TABLET ORAL at 08:09

## 2023-02-07 RX ADMIN — LEVOFLOXACIN 250 MG: 250 TABLET, FILM COATED ORAL at 17:11

## 2023-02-07 RX ADMIN — ONDANSETRON 4 MG: 2 INJECTION INTRAMUSCULAR; INTRAVENOUS at 02:54

## 2023-02-07 RX ADMIN — HEPARIN SODIUM 5000 UNITS: 5000 INJECTION INTRAVENOUS; SUBCUTANEOUS at 02:52

## 2023-02-07 RX ADMIN — LEVETIRACETAM 250 MG: 250 TABLET, FILM COATED ORAL at 12:57

## 2023-02-07 RX ADMIN — LEVOTHYROXINE SODIUM 300 MCG: 0.15 TABLET ORAL at 05:09

## 2023-02-07 RX ADMIN — SERTRALINE 50 MG: 50 TABLET, FILM COATED ORAL at 12:59

## 2023-02-07 RX ADMIN — CARVEDILOL 6.25 MG: 6.25 TABLET, FILM COATED ORAL at 12:57

## 2023-02-07 RX ADMIN — HEPARIN SODIUM 5000 UNITS: 5000 INJECTION INTRAVENOUS; SUBCUTANEOUS at 13:00

## 2023-02-07 RX ADMIN — FOLIC ACID 1 MG: 1 TABLET ORAL at 12:58

## 2023-02-07 RX ADMIN — INSULIN GLARGINE-YFGN 3 UNITS: 100 INJECTION, SOLUTION SUBCUTANEOUS at 23:29

## 2023-02-07 NOTE — PROGRESS NOTES
Name: Zaina Martinez ADMIT: 2023   : 1965  PCP: Provider, No Known    MRN: [id number] LOS: 1 days   AGE/SEX: 57 y.o. female  ROOM: [facility]/     Subjective   Subjective   Seen after dialysis, in chair. C/O aching all over, especially legs. Documented fever overnight, up to 101.2. Denies chest pain, soa, n/v/d, dysuria. C/O HA, body aches, occasional cough    Review of Systems   Constitutional: Negative for fever.   HENT: Negative for congestion.    Respiratory: Positive for cough. Negative for shortness of breath.    Cardiovascular: Negative for chest pain.   Gastrointestinal: Negative for abdominal pain, diarrhea, nausea and vomiting.   Genitourinary: Negative for dysuria.   Musculoskeletal: Positive for myalgias.   Skin: Negative for rash.   Neurological: Positive for weakness and headaches.   Psychiatric/Behavioral: Negative for sleep disturbance.        Objective   Objective   Vital Signs  Temp:  [97.3 °F (36.3 °C)-101.2 °F (38.4 °C)] 97.3 °F (36.3 °C)  Heart Rate:  [71-86] 86  Resp:  [16-18] 18  BP: (120-163)/(67-87) 120/73  SpO2:  [97 %-99 %] 99 %  on   ;   Device (Oxygen Therapy): room air  Body mass index is 23.87 kg/m².  Physical Exam  Vitals and nursing note reviewed.   Constitutional:       General: She is not in acute distress.     Appearance: She is ill-appearing.   HENT:      Head: Normocephalic.      Mouth/Throat:      Mouth: Mucous membranes are moist.   Eyes:      Conjunctiva/sclera: Conjunctivae normal.   Cardiovascular:      Rate and Rhythm: Normal rate and regular rhythm.   Pulmonary:      Effort: Pulmonary effort is normal. No respiratory distress.      Breath sounds: Decreased breath sounds present.   Abdominal:      General: Bowel sounds are normal.      Palpations: Abdomen is soft.   Musculoskeletal:      Cervical back: Neck supple.      Right lower leg: No edema.      Left lower leg: No edema.   Skin:     General: Skin is warm and dry.   Neurological:      Mental Status: She is  alert and oriented to person, place, and time.   Psychiatric:         Mood and Affect: Affect is flat and tearful.         Behavior: Behavior is slowed.       Results Review     I reviewed the patient's new clinical results.  Results from last 7 days   Lab Units 02/07/23  0833 02/06/23  0504 02/05/23 1352 02/04/23  0626   WBC 10*3/mm3 12.29* 12.64* 15.62* 11.81*   HEMOGLOBIN g/dL 8.6* 9.1* 9.8* 9.7*   PLATELETS 10*3/mm3 231 245 257 275     Results from last 7 days   Lab Units 02/06/23  0504 02/05/23  1352 02/04/23  0626 02/03/23  0723   SODIUM mmol/L 127* 131* 128* 132*   POTASSIUM mmol/L 4.5 4.3 4.2 3.8   CHLORIDE mmol/L 95* 97* 95* 99   CO2 mmol/L 22.0 20.1* 21.0* 19.9*   BUN mg/dL 16 14 21* 14   CREATININE mg/dL 2.94* 2.68* 2.59* 2.11*   GLUCOSE mg/dL 106* 116* 150* 169*   EGFR mL/min/1.73 18.1* 20.2* 21.0* 26.9*     Results from last 7 days   Lab Units 02/05/23  1352 02/04/23  0626 02/03/23  0723 02/01/23  1109   ALBUMIN g/dL 2.7* 3.0* 2.9* 2.9*   BILIRUBIN mg/dL 0.5  --   --  0.6   ALK PHOS U/L 176*  --   --  226*   AST (SGOT) U/L 13  --   --  12   ALT (SGPT) U/L <5  --   --  <5     Results from last 7 days   Lab Units 02/06/23  0504 02/05/23  1352 02/04/23  0626 02/03/23  0723 02/01/23  1109   CALCIUM mg/dL 8.3* 8.3* 8.2* 8.2* 8.2*   ALBUMIN g/dL  --  2.7* 3.0* 2.9* 2.9*   PHOSPHORUS mg/dL  --   --  2.0* 2.0* 2.9     Results from last 7 days   Lab Units 02/05/23  1352 02/01/23  1109   PROCALCITONIN ng/mL 0.68*  --    LACTATE mmol/L  --  0.9     Glucose   Date/Time Value Ref Range Status   02/07/2023 1232 85 70 - 130 mg/dL Final     Comment:     Meter: [id number] : [id number] Jose Porteranda REGINALDO   02/07/2023 0555 88 70 - 130 mg/dL Final     Comment:     Meter: [id number] : [id number] Aureliano Nunn RN   02/06/2023 2207 103 70 - 130 mg/dL Final     Comment:     Meter: [id number] : [id number] Aureliano Nunn RN   02/06/2023 1621 209 (H) 70 - 130 mg/dL Final     Comment:     Meter: [id number] :  [id number] Jose Johnson CNA   02/06/2023 1126 122 70 - 130 mg/dL Final     Comment:     Meter: [id number] : [id number] Jose Johnson CNA   02/06/2023 0608 96 70 - 130 mg/dL Final     Comment:     Meter: [id number] : [id number] Cyril Chan QUENTIN   02/05/2023 1953 133 (H) 70 - 130 mg/dL Final     Comment:     Meter: [id number] : [id number] Cyril Chan QUENTIN       No radiology results for the last day  I have personally reviewed all medications:  Scheduled Medications  amLODIPine, 10 mg, Oral, Q24H  aspirin, 81 mg, Oral, Daily  carvedilol, 6.25 mg, Oral, BID With Meals  desvenlafaxine, 25 mg, Oral, Daily  doxycycline, 100 mg, Oral, Q12H  folic acid, 1 mg, Oral, Daily  heparin (porcine), 5,000 Units, Subcutaneous, Q8H  insulin glargine, 3 Units, Subcutaneous, Nightly  insulin lispro, 0-9 Units, Subcutaneous, TID With Meals  lactulose, 10 g, Oral, BID  levETIRAcetam, 250 mg, Oral, BID  levoFLOXacin, 250 mg, Oral, Q24H  levothyroxine, 300 mcg, Oral, Q AM  lidocaine, 1 patch, Transdermal, Q24H  lisinopril, 20 mg, Oral, Nightly  pantoprazole, 40 mg, Oral, Daily  rOPINIRole, 2 mg, Oral, Nightly  sertraline, 50 mg, Oral, Daily    Infusions   Diet  Diet: Regular/House Diet; Texture: Regular Texture (IDDSI 7); Fluid Consistency: Thin (IDDSI 0)    I have personally reviewed:  [x]  Laboratory   [x]  Microbiology   [x]  Radiology   [x]  EKG/Telemetry  [x]  Cardiology/Vascular   [x]  Pathology    [x]  Records       Assessment/Plan     Active Hospital Problems    Diagnosis  POA   • **Generalized weakness [R53.1]  Yes   • Severe malnutrition ([facility]) [E43]  Yes   • Pyuria [R82.81]  Yes   • Chronic systolic CHF (congestive heart failure) ([facility]) [I50.22]  Yes   • History of stroke- R MCA s/p TPA with subsequent ICH with debility [Z86.73]  Not Applicable   • ESRD (end stage renal disease) ([facility]) [N18.6]  Yes   • Hypothyroidism [E03.9]  Yes   • Hypertensive disorder [I10]  Yes   • Type 2 diabetes mellitus with kidney  complication, with long-term current use of insulin ([facility]) [E11.29, Z79.4]  Not Applicable      Resolved Hospital Problems   No resolved problems to display.       57 y.o. female admitted with Generalized weakness.    • Low grade fever 2/5 with elevated WBC, and concern for LLL pneumonia on CXR. RVP neg.  Her procalcitonin is a little bit elevated, but this may be due to her renal disease. Prescribed doxycycline and renally dose levaquin. +MRSA screen. BC NGTD. Fever back up to 101.2 overnight. ID consulted to evaluate  • Generalized weakness-this is the patient's 18th admission in 13 months. She has refused rehab in the past, but is agreeable this time and her  cannot care for her at home. CCP is working on getting her set up with a nursing facility  • Pyuria-asymptomatic. ID recommended monitoring off abx; reconsulted  • ESRD on HD-nephrology is consulted for routine dialysis  • Chronic systolic heart failure with recovered EF-on coreg. Started on lisinopril this admission. Cannot tolerate SGLT2i due to her ESRD   • Type 2 diabetes on insulin-a1c 6.2. glucose is adequately controlled. Continue home regimen and sliding scale. Inconsistent with po intake, monitor closely, avoid hypoglycemia  • Hypertension-adequate control acutely  • Hypothyroidism-synthroid. She did have trouble correcting her TSH despite supplementation for a month during her prior hospitalization and required IV synthroid. Free t4 here is normal  • Rheumatoid arthritis-previously on chronic steroids, but these appear to have been stopped several months ago. AM cortisol was normal  • Paroxysmal afib-not a candidate for AC given recent hemorrhagic shock. On asa and coreg  • Seizure disorder-keppra  • GERD-ppi  • History of CVA  • Recent right femur fracture  • History of left renal hematoma causing life-threatening hemorrhagic shock s/p left renal artery embolization on 1/1/23-no longer a candidate for AC  Anxiety-prn ativan. Depression:  pristiq.  Psychiatry is following         · SCDs for DVT prophylaxis.  · Full code.  · Discussed with patient.  · Anticipate discharge to SNU facility once precert has been obtained.      RIKA William  [facility]  02/07/23  14:28 EST

## 2023-02-07 NOTE — PROGRESS NOTES
[facility] Progress Note      Patient Name: Zaina Martinez  : 1965  MRN: [id number]  Primary Care Physician:  Provider, No Known  Date of admission: 2023    Subjective     Interval History:   Follow up ESRD.  On dialysis.   Feels cold.  Qb 400.  Temp 98.2.  Received tylenol before dialysis. Temp max 101.2 last night. Chilling last night.  Does not feel well at all.     Review of Systems:   As noted above    Objective     Vitals:   Temp:  [97.3 °F (36.3 °C)-101.2 °F (38.4 °C)] 98.2 °F (36.8 °C)  Heart Rate:  [71-86] 81  Resp:  [16] 16  BP: (137-163)/(67-87) 154/87    Intake/Output Summary (Last 24 hours) at 2023 1214  Last data filed at 2023 1200  Gross per 24 hour   Intake 360 ml   Output 3000 ml   Net -2640 ml       Physical Exam:    General Appearance: on dialysis. Mask in place. Very frail.  Tearful.    Skin: warm and dry  HEENT: mask in place.   Neck:RIJ TDC  Lungs: Clear to auscultation.   Heart: RRR, no s3 or rub  Abdomen: soft, nontender, nondistended  Extremities: no edema, cyanosis or clubbing  Neuro: flat affect. Moves all 4 ext.   Scheduled Meds:     amLODIPine, 10 mg, Oral, Q24H  aspirin, 81 mg, Oral, Daily  carvedilol, 6.25 mg, Oral, BID With Meals  desvenlafaxine, 25 mg, Oral, Daily  doxycycline, 100 mg, Oral, Q12H  folic acid, 1 mg, Oral, Daily  heparin (porcine), 5,000 Units, Subcutaneous, Q8H  insulin glargine, 3 Units, Subcutaneous, Nightly  insulin lispro, 0-9 Units, Subcutaneous, TID With Meals  lactulose, 10 g, Oral, BID  levETIRAcetam, 250 mg, Oral, BID  levoFLOXacin, 250 mg, Oral, Q24H  levothyroxine, 300 mcg, Oral, Q AM  lidocaine, 1 patch, Transdermal, Q24H  lisinopril, 20 mg, Oral, Nightly  pantoprazole, 40 mg, Oral, Daily  rOPINIRole, 2 mg, Oral, Nightly  sertraline, 50 mg, Oral, Daily      IV Meds:        Results Reviewed:   I have personally reviewed the results from the time of this admission to 2023 12:14 EST     Results from last 7  days   Lab Units 02/06/23  0504 02/05/23  1352 02/04/23  0626 02/03/23  0723 02/01/23  1109   SODIUM mmol/L 127* 131* 128*   < > 133*   POTASSIUM mmol/L 4.5 4.3 4.2   < > 3.7   CHLORIDE mmol/L 95* 97* 95*   < > 96*   CO2 mmol/L 22.0 20.1* 21.0*   < > 26.0   BUN mg/dL 16 14 21*   < > 25*   CREATININE mg/dL 2.94* 2.68* 2.59*   < > 3.13*   CALCIUM mg/dL 8.3* 8.3* 8.2*   < > 8.2*   BILIRUBIN mg/dL  --  0.5  --   --  0.6   ALK PHOS U/L  --  176*  --   --  226*   ALT (SGPT) U/L  --  <5  --   --  <5   AST (SGOT) U/L  --  13  --   --  12   GLUCOSE mg/dL 106* 116* 150*   < > 190*    < > = values in this interval not displayed.       Estimated Creatinine Clearance: 19.7 mL/min (A) (by C-G formula based on SCr of 2.94 mg/dL (H)).    Results from last 7 days   Lab Units 02/04/23  0626 02/03/23  0723 02/01/23  1109   PHOSPHORUS mg/dL 2.0* 2.0* 2.9             Results from last 7 days   Lab Units 02/07/23  0833 02/06/23  0504 02/05/23  1352 02/04/23  0626 02/03/23 0723   WBC 10*3/mm3 12.29* 12.64* 15.62* 11.81* 12.10*   HEMOGLOBIN g/dL 8.6* 9.1* 9.8* 9.7* 10.5*   PLATELETS 10*3/mm3 231 245 257 275 264             Assessment / Plan     ASSESSMENT:  1. ESRD.  Dialysis today.  2. Fever with cough. Off antibiotic. I sent 2 blood cultures on dialysis this am.   Ask ID to re-evaluate.  3. Generalized weakness and failure to thrive with multiple hospitalizations.   4. Chronic systolic heart failure.   5. Hypothyroid on replacement.  Synthroid dose increased last admission and required IV synthroid.   6. Recent right femur fracture repair 11/10/22.   7. Left renal hematoma after left ureteral stent 12/30 .  Left renal artery embolization 1/1/23. Cysto, left stent removal 1/10/23.   8. Anemia CKD and blood loss.  9. DM2  10. History of CVA  11. Left 5th rib fracture .    PLAN:  1. Dialysis today.  2. Ask ID to re-evaluate with fever 101 last night.   Ruth Lira MD  02/07/23  12:14 EST    [facility]  [phone]

## 2023-02-07 NOTE — PLAN OF CARE
Goal Outcome Evaluation:  Plan of Care Reviewed With: patient           Outcome Evaluation: Pt agreeable to PT this afternoon. She was off floor to dialysis most of the day and is not up in recliner chair. She would like to get back to bed. SHe is extremely weak w decreased activity tolerance. Pt able to stand w mod/max A x 2, but unable to take any steps and had to sit back down. Chair moved closer to bed and she performed stand pivot transfer from chair to bed w  max A. She required max A to perform sit > supine. Limited progress. Will continue to advance activity as able.

## 2023-02-07 NOTE — THERAPY TREATMENT NOTE
Patient Name: Zaina Martinez  : 1965    MRN: [id number]                              Today's Date: 2023       Admit Date: 2023    Visit Dx:     ICD-10-CM ICD-9-CM   1. Pyuria  R82.81 791.9   2. Weakness  R53.1 780.79     Patient Active Problem List   Diagnosis   • Renal insufficiency   • Hypertensive disorder   • Hypothyroidism   • Type 2 diabetes mellitus with kidney complication, with long-term current use of insulin ([facility])   • Rheumatoid arthritis ([facility])   • Angioedema   • Esophageal dysmotility   • Anemia   • Medically noncompliant   • Myocardial infarction due to demand ischemia ([facility])   • Enteritis   • PRES (posterior reversible encephalopathy syndrome)   • Urine retention   • Klebsiella infection   • Superficial thrombophlebitis   • Generalized weakness   • ESRD (end stage renal disease) ([facility])   • CAD (coronary artery disease)   • Abnormal urinalysis   • Chronic diastolic CHF (congestive heart failure) ([facility])   • Pyelonephritis   • Calculus of gallbladder with acute on chronic cholecystitis without obstruction   • Pleural effusion on right   • Anemia due to chronic kidney disease, on chronic dialysis ([facility])   • Abnormal findings on diagnostic imaging of other specified body structures   • Acute upper respiratory infection   • Agitation   • Alkaline phosphatase raised   • Casts present in urine   • Cellulitis of toe   • Hip pain   • Community acquired pneumonia   • Depressive disorder   • Diarrhea of presumed infectious origin   • Difficult or painful urination   • Disease due to severe acute respiratory syndrome coronavirus 2 (SARS-CoV-2)   • Dyspnea   • Encounter for follow-up examination after completed treatment for conditions other than malignant neoplasm   • H/O: hypothyroidism   • Hyperlipidemia   • Hypomagnesemia   • Intractable vomiting with nausea   • Luetscher's syndrome   • Need for influenza vaccination   • Restless legs   • Noncompliance with treatment   • Shoulder pain   • Acute UTI  (urinary tract infection)   • Metabolic encephalopathy   • Abnormal findings on diagnostic imaging of abdomen   • Status post cholecystectomy   • Hyponatremia   • Acute metabolic encephalopathy   • Encephalopathy, toxic   • Acute CVA (cerebrovascular accident) (HCC)   • History of intracranial hemorrhage   • Stroke (HCC)   • Abnormal urinalysis   • Diabetic muscle infarction (HCC)   • Other insomnia   • Altered mental status   • History of stroke- R MCA s/p TPA with subsequent ICH with debility   • Heart murmur   • Bradycardia   • Left lower lobe pneumonia   • Metabolic encephalopathy   • Pericardial effusion   • Pleural effusion   • Acute pulmonary edema (HCC)   • Atrial flutter (HCC)   • Chronic systolic CHF (congestive heart failure) (HCC)   • Thrombus of venous dialysis catheter (HCC)   • Sepsis without acute organ dysfunction (HCC)   • Type 2 diabetes mellitus with hyperglycemia, with long-term current use of insulin (HCC)   • Acute respiratory failure with hypoxia (HCC)   • Acute on chronic systolic CHF (congestive heart failure) (HCC)   • Hyperkalemia   • Acute respiratory failure with hypoxia (HCC)   • Other hypervolemia   • Hematoma of right hip, initial encounter   • Ureteral stent present   • Closed intertrochanteric fracture of right femur (HCC)   • Witnessed seizure-like activity (HCC)   • Acute respiratory failure with hypercapnia (HCC)   • Opioid use   • Diabetic muscle infarction (HCC)   • Acute posthemorrhagic anemia   • Kidney hematoma   • Pyuria   • Severe malnutrition (HCC)     Past Medical History:   Diagnosis Date   • Acute CVA (cerebrovascular accident) (HCC) 05/01/2022   • Acute on chronic diastolic CHF (congestive heart failure) (HCC)    • Anemia    • CAD (coronary artery disease) 12/20/2021   • Diabetes (HCC)    • Disease of thyroid gland    • GERD (gastroesophageal reflux disease)    • History of intracranial hemorrhage 5/1/2022   • Hyperlipidemia 11/30/2018   • Hypertension    •  Rheumatoid arthritis (HCC)    • Stage 5 chronic kidney disease (HCC)      Past Surgical History:   Procedure Laterality Date   • CHOLECYSTECTOMY WITH INTRAOPERATIVE CHOLANGIOGRAM N/A 1/10/2022    Procedure: Laparoscopic cholecystectomy with intraoperative cholangiogram;  Surgeon: Ramana Raygoza MD;  Location: [facility];  Service: General;  Laterality: N/A;   • CYSTOSCOPY W/ URETERAL STENT PLACEMENT Left 9/29/2022    Procedure: CYSTOSCOPY URETERAL STENT INSERTION WITH RETROGRADE PYELOGRAM;  Surgeon: Bryant Phan Jr., MD;  Location: [facility] OR;  Service: Urology;  Laterality: Left;   • CYSTOSCOPY W/ URETERAL STENT PLACEMENT Left 1/10/2023    Procedure: CYSTOSCOPY LEFT STENT REMOVAL;  Surgeon: Bryant Phan Jr., MD;  Location: [facility] OR;  Service: Urology;  Laterality: Left;   • EMBOLIZATION MESENTERIC ARTERY N/A 1/1/2023    Procedure: LEFT KIDNEY EMBOLIZATION;  Surgeon: Bijan Ordoñez MD;  Location: [facility] OR 18/19;  Service: Interventional Radiology;  Laterality: N/A;   • EYE SURGERY     • FEMUR IM NAILING/RODDING Right 11/10/2022    Procedure: FEMUR INTRAMEDULLARY NAILING/RODDING;  Surgeon: Glen Pierce MD;  Location: [facility] OR;  Service: Orthopedics;  Laterality: Right;   • HIP INTERTROCHANTERIC NAILING Right 11/10/2022    Procedure: HIP INTERTROCHANTERIC NAILING;  Surgeon: Glen Pierce MD;  Location: [facility] OR;  Service: Orthopedics;  Laterality: Right;   • HYSTERECTOMY     • INSERT CENTRAL LINE AT BEDSIDE  12/31/2022        • INSERTION HEMODIALYSIS CATHETER N/A 12/6/2021    Procedure: HEMODIALYSIS CATHETER INSERTION;  Surgeon: Keli Salazar MD;  Location: [facility] OR 18/19;  Service: Vascular;  Laterality: N/A;   • INSERTION HEMODIALYSIS CATHETER N/A 5/3/2022    Procedure: TUNNELED CATHETER PLACEMENT;  Surgeon: Keli Salazar MD;  Location: [facility] OR;  Service: Vascular;  Laterality: N/A;   • MUSCLE BIOPSY Left  6/15/2022    Procedure: Left quadriceps muscle biopsy;  Surgeon: Marques Ma MD;  Location: [facility];  Service: Neurosurgery;  Laterality: Left;   • URETEROSCOPY LASER LITHOTRIPSY WITH STENT INSERTION Left 12/30/2022    Procedure: CYSTOSCOPY WITH LEFT  URETEROSCOPY  LEFT STENT EXCHANGE;  Surgeon: Bryant Phan Jr., MD;  Location: [facility];  Service: Urology;  Laterality: Left;      General Information     Row Name 02/07/23 1528          Physical Therapy Time and Intention    Document Type therapy note (daily note)  -     Mode of Treatment physical therapy  -     Row Name 02/07/23 1528          General Information    Existing Precautions/Restrictions fall  -           User Key  (r) = Recorded By, (t) = Taken By, (c) = Cosigned By    Initials Name Provider Type    EJ Nicol Rios, PT Physical Therapist               Mobility     Row Name 02/07/23 1528          Bed Mobility    Supine-Sit [place] (Bed Mobility) not tested  -     Sit-Supine [place] (Bed Mobility) maximum assist (25% patient effort)  -     Assistive Device (Bed Mobility) head of bed elevated;bed rails  -     Row Name 02/07/23 1528          Bed-Chair Transfer    Bed-Chair [place] (Transfers) verbal cues;nonverbal cues (demo/gesture);maximum assist (25% patient effort)  -     Assistive Device (Bed-Chair Transfers) --  HHA  -EJ     Comment, (Bed-Chair Transfer) chair to bed, stand pivot  -     Row Name 02/07/23 1528          Sit-Stand Transfer    Sit-Stand [place] (Transfers) verbal cues;nonverbal cues (demo/gesture);maximum assist (25% patient effort);2 person assist;moderate assist (50% patient effort)  -     Assistive Device (Sit-Stand Transfers) --  HHA x 2  -EJ     Comment, (Sit-Stand Transfer) stood from chair, very weak, unable to take steps, pt sat back down and then cahir brought closer to bed, max A for stand pivot from chair to bed  -     Row Name 02/07/23 1528           Gait/Stairs (Locomotion)    [place] Level (Gait) unable to assess  -EJ           User Key  (r) = Recorded By, (t) = Taken By, (c) = Cosigned By    Initials Name Provider Type    Nicol Terry, PT Physical Therapist               Obj/Interventions    No documentation.                Goals/Plan    No documentation.                Clinical Impression     Row Name 02/07/23 1530          Pain    Pretreatment Pain Rating 0/10 - no pain  -EJ     Row Name 02/07/23 1530          Plan of Care Review    Plan of Care Reviewed With patient  -EJ     Outcome Evaluation Pt agreeable to PT this afternoon. She was off floor to dialysis most of the day and is not up in recliner chair. She would like to get back to bed. SHe is extremely weak w decreased activity tolerance. Pt able to stand w mod/max A x 2, but unable to take any steps and had to sit back down. Chair moved closer to bed and she performed stand pivot transfer from chair to bed w  max A. She required max A to perform sit > supine. Limited progress. Will continue to advance activity as able.  -EJ     Row Name 02/07/23 1530          Positioning and Restraints    Pre-Treatment Position sitting in chair/recliner  -EJ     Post Treatment Position bed  -EJ     In Bed notified nsg;supine;call light within reach;exit alarm on;encouraged to call for assist  -EJ           User Key  (r) = Recorded By, (t) = Taken By, (c) = Cosigned By    Initials Name Provider Type    Nicol Terry, PT Physical Therapist               Outcome Measures     Row Name 02/07/23 1532          How much help from another person do you currently need...    Turning from your back to your side while in flat bed without using bedrails? 2  -EJ     Moving from lying on back to sitting on the side of a flat bed without bedrails? 1  -EJ     Moving to and from a bed to a chair (including a wheelchair)? 1  -EJ     Standing up from a chair using your arms (e.g., wheelchair, bedside chair)? 2  -EJ      Climbing 3-5 steps with a railing? 1  -EJ     To walk in hospital room? 1  -EJ     AM-PAC 6 Clicks Score (PT) 8  -EJ     Highest level of mobility 3 --> Sat at edge of bed  -EJ           User Key  (r) = Recorded By, (t) = Taken By, (c) = Cosigned By    Initials Name Provider Type    EJ Nicol Rios, PT Physical Therapist                             Physical Therapy Education     Title: PT OT SLP Therapies (In Progress)     Topic: Physical Therapy (Done)     Point: Mobility training (Done)     Learning Progress Summary           Patient Acceptance, E,TB, VU,NR by CB at 2/6/2023 1409    Acceptance, E,D, DU by PC at 2/2/2023 1409                   Point: Home exercise program (Done)     Learning Progress Summary           Patient Acceptance, E,TB, VU,NR by CB at 2/6/2023 1409    Acceptance, E,D, DU by PC at 2/2/2023 1409                   Point: Body mechanics (Done)     Learning Progress Summary           Patient Acceptance, E,TB, VU,NR by CB at 2/6/2023 1409    Acceptance, E,D, DU by PC at 2/2/2023 1409                   Point: Precautions (Done)     Learning Progress Summary           Patient Acceptance, E,TB, VU,NR by CB at 2/6/2023 1409    Acceptance, E, VU by MD at 2/3/2023 1130    Acceptance, E,D, DU by PC at 2/2/2023 1409                               User Key     Initials Effective Dates Name Provider Type Discipline     06/16/21 -  Cristela Perez PT Physical Therapist PT    MD 06/16/21 -  Jeannette Easton, PT Physical Therapist PT     10/22/21 -  Zainab Cobos, PT Physical Therapist PT              PT Recommendation and Plan     Plan of Care Reviewed With: patient  Outcome Evaluation: Pt agreeable to PT this afternoon. She was off floor to dialysis most of the day and is not up in recliner chair. She would like to get back to bed. SHe is extremely weak w decreased activity tolerance. Pt able to stand w mod/max A x 2, but unable to take any steps and had to sit back down. Chair moved closer to bed  and she performed stand pivot transfer from chair to bed w  max A. She required max A to perform sit > supine. Limited progress. Will continue to advance activity as able.     Time Calculation:    PT Charges     Row Name 02/07/23 1533             Time Calculation    Start Time 1455  -EJ      Stop Time 1508  -EJ      Time Calculation (min) 13 min  -EJ      PT Received On 02/07/23  -EJ      PT - Next Appointment 02/08/23  -EJ            User Key  (r) = Recorded By, (t) = Taken By, (c) = Cosigned By    Initials Name Provider Type    Nicol Terry, PT Physical Therapist              Therapy Charges for Today     Code Description Service Date Service Provider Modifiers Qty    25222405209 HC PT THERAPEUTIC ACT EA 15 MIN 2/7/2023 Nicol Rios, PT GP 1    73884228186 HC PT THER SUPP EA 15 MIN 2/7/2023 Nicol Rios, PT GP 1          PT G-Codes  Outcome Measure Options: AM-PAC 6 Clicks Basic Mobility (PT)  AM-PAC 6 Clicks Score (PT): 8  AM-PAC 6 Clicks Score (OT): 11  PT Discharge Summary  Anticipated Discharge Disposition (PT): skilled nursing facility    Nicol Rios PT  2/7/2023

## 2023-02-07 NOTE — NURSING NOTE
hd without incident or c/o. tolerated well. removed 3 l as ordered. no meds administered. drsg current, cdi. stable post completion of hd.

## 2023-02-07 NOTE — PROGRESS NOTES
ID note for fever in adult    Subjective: Asked to see for fever yesterday.  Has been on levofloxacin and doxycycline for possible pneumonia.  She denies any acute complaints.  Has chronic cough and chronic abdominal pain.  Currently afebrile.    Objective:   Temp:  [97.3 °F (36.3 °C)-101.2 °F (38.4 °C)] 97.3 °F (36.3 °C)  Heart Rate:  [71-86] 86  Resp:  [16-18] 18  BP: (120-163)/(67-87) 120/73  Chronically ill-appearing, nontoxic, abdomen soft, pulm effort normal, no lower extremity edema, no rashes.  White count 12.29  Hemoglobin 8.6  Platelets 231  2/1 blood cultures negative, respiratory pathogen panel negative  2/2 C. difficile negative  2/5 respiratory pathogen panel negative  2/5 blood cultures negative  2/7 blood cultures negative  Chest x-ray independently interpreted: Left lower lobe pneumonia  Assessment and plan  1.  End-stage renal disease  2.  Fever in adult  3.  Rheumatoid arthritis, off therapy  4.  Left renal hematoma    Wide differential for fever including infectious etiology such as bacteremia, pleural effusion, intra-abdominal abscess and noninfectious etiologies including rheumatoid arthritis, hematoma or DVT.  Blood cultures have been sent.    I will check lower extremity duplex venography as she is not on any anticoagulation given her life-threatening hemorrhage.    Currently afebrile.  If she does have additional fevers then I think the next step would be for a CT abdomen pelvis to look for any secondarily infected hematoma and will give us a better look at her left lower lobe    I really do not think she has a left lower lobe pneumonia as this infiltrate has been present since January.

## 2023-02-07 NOTE — PROGRESS NOTES
[facility] follow up d/t anxiety; this writer reviewed chart and spoke with RN Arline. Per RN, patient was anxious and tearful overnight; Ativan given and appears effective.  Per EMR, psychiatrist decreased Zoloft and started on low-dose Pristiq; discharge plan is SNF placement (with HD)- possibly [facility]. No further needs/concerns noted at this time per RN and/or medical team; [facility] to continue following.

## 2023-02-07 NOTE — PLAN OF CARE
Goal Outcome Evaluation:  Plan of Care Reviewed With: patient        Progress: no change  Outcome Evaluation: Max temp 101.2-tylenol given, axox4, anxious tonight- PO Ativan given prn, monitoring blood sugars, blanchable redness to coccyx/buttocks area- mepilex applied, incontient of both stool/urine -brief in place, c/o of nausea- Zofran given x2 w/ relief, PO abx given, new IV placed

## 2023-02-07 NOTE — PROGRESS NOTES
Continued Stay Note  [facility]     Patient Name: Zaina Martinez  MRN: [id number]  Today's Date: 2/7/2023    Admit Date: 2/1/2023    Plan: [facility] (Pending Pre-cert)   Discharge Plan     Row Name 02/07/23 1056       Plan    Plan [facility] (Pending Pre-cert)    Plan Comments Spoke to spouse, Brain at [phone] who confirmed he toured [facility] and wants patient to go there. Spoke to Gina with [facility] who stated she will work on approveal for HD chair and begin Pre-cert.               Discharge Codes    No documentation.               Expected Discharge Date and Time     Expected Discharge Date Expected Discharge Time    Feb 8, 2023             Liset Beckman RN

## 2023-02-08 ENCOUNTER — APPOINTMENT (OUTPATIENT)
Dept: CT IMAGING | Facility: HOSPITAL | Age: 58
End: 2023-02-08
Payer: MEDICARE

## 2023-02-08 ENCOUNTER — APPOINTMENT (OUTPATIENT)
Dept: CARDIOLOGY | Facility: HOSPITAL | Age: 58
End: 2023-02-08
Payer: MEDICARE

## 2023-02-08 LAB
ALBUMIN SERPL-MCNC: 2.5 G/DL (ref 3.5–5.2)
ANION GAP SERPL CALCULATED.3IONS-SCNC: 8 MMOL/L (ref 5–15)
BASOPHILS # BLD AUTO: 0.03 10*3/MM3 (ref 0–0.2)
BASOPHILS NFR BLD AUTO: 0.3 % (ref 0–1.5)
BH CV LOWER VASCULAR LEFT COMMON FEMORAL AUGMENT: NORMAL
BH CV LOWER VASCULAR LEFT COMMON FEMORAL COMPETENT: NORMAL
BH CV LOWER VASCULAR LEFT COMMON FEMORAL COMPRESS: NORMAL
BH CV LOWER VASCULAR LEFT COMMON FEMORAL PHASIC: NORMAL
BH CV LOWER VASCULAR LEFT COMMON FEMORAL SPONT: NORMAL
BH CV LOWER VASCULAR LEFT DISTAL FEMORAL COMPRESS: NORMAL
BH CV LOWER VASCULAR LEFT GASTRONEMIUS COMPRESS: NORMAL
BH CV LOWER VASCULAR LEFT GREATER SAPH AK COMPRESS: NORMAL
BH CV LOWER VASCULAR LEFT GREATER SAPH BK COMPRESS: NORMAL
BH CV LOWER VASCULAR LEFT LESSER SAPH COMPRESS: NORMAL
BH CV LOWER VASCULAR LEFT MID FEMORAL AUGMENT: NORMAL
BH CV LOWER VASCULAR LEFT MID FEMORAL COMPETENT: NORMAL
BH CV LOWER VASCULAR LEFT MID FEMORAL COMPRESS: NORMAL
BH CV LOWER VASCULAR LEFT MID FEMORAL PHASIC: NORMAL
BH CV LOWER VASCULAR LEFT MID FEMORAL SPONT: NORMAL
BH CV LOWER VASCULAR LEFT PERONEAL COMPRESS: NORMAL
BH CV LOWER VASCULAR LEFT POPLITEAL AUGMENT: NORMAL
BH CV LOWER VASCULAR LEFT POPLITEAL COMPETENT: NORMAL
BH CV LOWER VASCULAR LEFT POPLITEAL COMPRESS: NORMAL
BH CV LOWER VASCULAR LEFT POPLITEAL PHASIC: NORMAL
BH CV LOWER VASCULAR LEFT POPLITEAL SPONT: NORMAL
BH CV LOWER VASCULAR LEFT POSTERIOR TIBIAL COMPRESS: NORMAL
BH CV LOWER VASCULAR LEFT PROFUNDA FEMORAL COMPRESS: NORMAL
BH CV LOWER VASCULAR LEFT PROXIMAL FEMORAL COMPRESS: NORMAL
BH CV LOWER VASCULAR LEFT SAPHENOFEMORAL JUNCTION COMPRESS: NORMAL
BH CV LOWER VASCULAR RIGHT COMMON FEMORAL AUGMENT: NORMAL
BH CV LOWER VASCULAR RIGHT COMMON FEMORAL COMPETENT: NORMAL
BH CV LOWER VASCULAR RIGHT COMMON FEMORAL COMPRESS: NORMAL
BH CV LOWER VASCULAR RIGHT COMMON FEMORAL PHASIC: NORMAL
BH CV LOWER VASCULAR RIGHT COMMON FEMORAL SPONT: NORMAL
BH CV LOWER VASCULAR RIGHT DISTAL FEMORAL COMPRESS: NORMAL
BH CV LOWER VASCULAR RIGHT GASTRONEMIUS COMPRESS: NORMAL
BH CV LOWER VASCULAR RIGHT GREATER SAPH AK COMPRESS: NORMAL
BH CV LOWER VASCULAR RIGHT GREATER SAPH BK COMPRESS: NORMAL
BH CV LOWER VASCULAR RIGHT LESSER SAPH COMPRESS: NORMAL
BH CV LOWER VASCULAR RIGHT MID FEMORAL AUGMENT: NORMAL
BH CV LOWER VASCULAR RIGHT MID FEMORAL COMPETENT: NORMAL
BH CV LOWER VASCULAR RIGHT MID FEMORAL COMPRESS: NORMAL
BH CV LOWER VASCULAR RIGHT MID FEMORAL PHASIC: NORMAL
BH CV LOWER VASCULAR RIGHT MID FEMORAL SPONT: NORMAL
BH CV LOWER VASCULAR RIGHT PERONEAL COMPRESS: NORMAL
BH CV LOWER VASCULAR RIGHT POPLITEAL AUGMENT: NORMAL
BH CV LOWER VASCULAR RIGHT POPLITEAL COMPETENT: NORMAL
BH CV LOWER VASCULAR RIGHT POPLITEAL COMPRESS: NORMAL
BH CV LOWER VASCULAR RIGHT POPLITEAL PHASIC: NORMAL
BH CV LOWER VASCULAR RIGHT POPLITEAL SPONT: NORMAL
BH CV LOWER VASCULAR RIGHT POSTERIOR TIBIAL COMPRESS: NORMAL
BH CV LOWER VASCULAR RIGHT PROFUNDA FEMORAL COMPRESS: NORMAL
BH CV LOWER VASCULAR RIGHT PROXIMAL FEMORAL COMPRESS: NORMAL
BH CV LOWER VASCULAR RIGHT SAPHENOFEMORAL JUNCTION COMPRESS: NORMAL
BUN SERPL-MCNC: 14 MG/DL (ref 6–20)
BUN/CREAT SERPL: 5.1 (ref 7–25)
CALCIUM SPEC-SCNC: 8.2 MG/DL (ref 8.6–10.5)
CHLORIDE SERPL-SCNC: 99 MMOL/L (ref 98–107)
CO2 SERPL-SCNC: 22 MMOL/L (ref 22–29)
CREAT SERPL-MCNC: 2.74 MG/DL (ref 0.57–1)
DEPRECATED RDW RBC AUTO: 47.3 FL (ref 37–54)
EGFRCR SERPLBLD CKD-EPI 2021: 19.6 ML/MIN/1.73
EOSINOPHIL # BLD AUTO: 0.01 10*3/MM3 (ref 0–0.4)
EOSINOPHIL NFR BLD AUTO: 0.1 % (ref 0.3–6.2)
ERYTHROCYTE [DISTWIDTH] IN BLOOD BY AUTOMATED COUNT: 15.8 % (ref 12.3–15.4)
GLUCOSE BLDC GLUCOMTR-MCNC: 74 MG/DL (ref 70–130)
GLUCOSE BLDC GLUCOMTR-MCNC: 75 MG/DL (ref 70–130)
GLUCOSE BLDC GLUCOMTR-MCNC: 80 MG/DL (ref 70–130)
GLUCOSE BLDC GLUCOMTR-MCNC: 95 MG/DL (ref 70–130)
GLUCOSE SERPL-MCNC: 96 MG/DL (ref 65–99)
HCT VFR BLD AUTO: 27.6 % (ref 34–46.6)
HGB BLD-MCNC: 8.7 G/DL (ref 12–15.9)
IMM GRANULOCYTES # BLD AUTO: 0.05 10*3/MM3 (ref 0–0.05)
IMM GRANULOCYTES NFR BLD AUTO: 0.4 % (ref 0–0.5)
LYMPHOCYTES # BLD AUTO: 1.11 10*3/MM3 (ref 0.7–3.1)
LYMPHOCYTES NFR BLD AUTO: 9.3 % (ref 19.6–45.3)
MAXIMAL PREDICTED HEART RATE: 163 BPM
MCH RBC QN AUTO: 25.9 PG (ref 26.6–33)
MCHC RBC AUTO-ENTMCNC: 31.5 G/DL (ref 31.5–35.7)
MCV RBC AUTO: 82.1 FL (ref 79–97)
MONOCYTES # BLD AUTO: 1.12 10*3/MM3 (ref 0.1–0.9)
MONOCYTES NFR BLD AUTO: 9.4 % (ref 5–12)
NEUTROPHILS NFR BLD AUTO: 80.5 % (ref 42.7–76)
NEUTROPHILS NFR BLD AUTO: 9.65 10*3/MM3 (ref 1.7–7)
NRBC BLD AUTO-RTO: 0 /100 WBC (ref 0–0.2)
PHOSPHATE SERPL-MCNC: 1.5 MG/DL (ref 2.5–4.5)
PLATELET # BLD AUTO: 237 10*3/MM3 (ref 140–450)
PMV BLD AUTO: 9.5 FL (ref 6–12)
POTASSIUM SERPL-SCNC: 3.5 MMOL/L (ref 3.5–5.2)
RBC # BLD AUTO: 3.36 10*6/MM3 (ref 3.77–5.28)
SODIUM SERPL-SCNC: 129 MMOL/L (ref 136–145)
STRESS TARGET HR: 139 BPM
WBC NRBC COR # BLD: 11.97 10*3/MM3 (ref 3.4–10.8)

## 2023-02-08 PROCEDURE — 74176 CT ABD & PELVIS W/O CONTRAST: CPT

## 2023-02-08 PROCEDURE — 96372 THER/PROPH/DIAG INJ SC/IM: CPT

## 2023-02-08 PROCEDURE — 25010000002 HEPARIN (PORCINE) PER 1000 UNITS: Performed by: STUDENT IN AN ORGANIZED HEALTH CARE EDUCATION/TRAINING PROGRAM

## 2023-02-08 PROCEDURE — 85025 COMPLETE CBC W/AUTO DIFF WBC: CPT | Performed by: NURSE PRACTITIONER

## 2023-02-08 PROCEDURE — G0378 HOSPITAL OBSERVATION PER HR: HCPCS

## 2023-02-08 PROCEDURE — 71250 CT THORAX DX C-: CPT

## 2023-02-08 PROCEDURE — 93970 EXTREMITY STUDY: CPT

## 2023-02-08 PROCEDURE — 82962 GLUCOSE BLOOD TEST: CPT

## 2023-02-08 PROCEDURE — 63710000001 ONDANSETRON PER 8 MG: Performed by: STUDENT IN AN ORGANIZED HEALTH CARE EDUCATION/TRAINING PROGRAM

## 2023-02-08 PROCEDURE — 25010000002 ONDANSETRON PER 1 MG: Performed by: STUDENT IN AN ORGANIZED HEALTH CARE EDUCATION/TRAINING PROGRAM

## 2023-02-08 PROCEDURE — 96376 TX/PRO/DX INJ SAME DRUG ADON: CPT

## 2023-02-08 PROCEDURE — 99214 OFFICE O/P EST MOD 30 MIN: CPT | Performed by: INTERNAL MEDICINE

## 2023-02-08 PROCEDURE — 80069 RENAL FUNCTION PANEL: CPT | Performed by: INTERNAL MEDICINE

## 2023-02-08 RX ORDER — LACTULOSE 10 G/15ML
10 SOLUTION ORAL DAILY PRN
Status: DISCONTINUED | OUTPATIENT
Start: 2023-02-08 | End: 2023-02-11 | Stop reason: HOSPADM

## 2023-02-08 RX ADMIN — LEVETIRACETAM 250 MG: 250 TABLET, FILM COATED ORAL at 21:09

## 2023-02-08 RX ADMIN — LEVETIRACETAM 250 MG: 250 TABLET, FILM COATED ORAL at 10:43

## 2023-02-08 RX ADMIN — ONDANSETRON 4 MG: 2 INJECTION INTRAMUSCULAR; INTRAVENOUS at 10:42

## 2023-02-08 RX ADMIN — DOXYCYCLINE 100 MG: 100 CAPSULE ORAL at 21:08

## 2023-02-08 RX ADMIN — ASPIRIN 81 MG: 81 TABLET, COATED ORAL at 10:39

## 2023-02-08 RX ADMIN — FOLIC ACID 1 MG: 1 TABLET ORAL at 10:44

## 2023-02-08 RX ADMIN — ROPINIROLE 2 MG: 2 TABLET, FILM COATED ORAL at 21:08

## 2023-02-08 RX ADMIN — HEPARIN SODIUM 5000 UNITS: 5000 INJECTION INTRAVENOUS; SUBCUTANEOUS at 17:47

## 2023-02-08 RX ADMIN — DOXYCYCLINE 100 MG: 100 CAPSULE ORAL at 10:41

## 2023-02-08 RX ADMIN — LEVOFLOXACIN 250 MG: 250 TABLET, FILM COATED ORAL at 17:38

## 2023-02-08 RX ADMIN — LISINOPRIL 20 MG: 20 TABLET ORAL at 21:09

## 2023-02-08 RX ADMIN — AMLODIPINE BESYLATE 10 MG: 10 TABLET ORAL at 10:40

## 2023-02-08 RX ADMIN — CARVEDILOL 6.25 MG: 6.25 TABLET, FILM COATED ORAL at 10:40

## 2023-02-08 RX ADMIN — LOPERAMIDE HCL 2 MG: 1 SOLUTION ORAL at 03:10

## 2023-02-08 RX ADMIN — SERTRALINE 50 MG: 50 TABLET, FILM COATED ORAL at 10:40

## 2023-02-08 RX ADMIN — SODIUM PHOSPHATE, MONOBASIC, MONOHYDRATE AND SODIUM PHOSPHATE, DIBASIC, ANHYDROUS 10 MMOL: 276; 142 INJECTION, SOLUTION INTRAVENOUS at 12:40

## 2023-02-08 RX ADMIN — DESVENLAFAXINE SUCCINATE 25 MG: 25 TABLET, EXTENDED RELEASE ORAL at 10:39

## 2023-02-08 RX ADMIN — LEVOTHYROXINE SODIUM 300 MCG: 0.15 TABLET ORAL at 07:00

## 2023-02-08 RX ADMIN — ONDANSETRON HYDROCHLORIDE 4 MG: 4 TABLET, FILM COATED ORAL at 17:45

## 2023-02-08 RX ADMIN — PANTOPRAZOLE SODIUM 40 MG: 40 TABLET, DELAYED RELEASE ORAL at 10:42

## 2023-02-08 RX ADMIN — HEPARIN SODIUM 5000 UNITS: 5000 INJECTION INTRAVENOUS; SUBCUTANEOUS at 10:41

## 2023-02-08 RX ADMIN — CARVEDILOL 6.25 MG: 6.25 TABLET, FILM COATED ORAL at 17:51

## 2023-02-08 RX ADMIN — LORAZEPAM 0.5 MG: 0.5 TABLET ORAL at 21:14

## 2023-02-08 RX ADMIN — ACETAMINOPHEN 650 MG: 325 TABLET, FILM COATED ORAL at 17:37

## 2023-02-08 RX ADMIN — HEPARIN SODIUM 5000 UNITS: 5000 INJECTION INTRAVENOUS; SUBCUTANEOUS at 02:50

## 2023-02-08 NOTE — PROGRESS NOTES
[facility] Progress Note      Patient Name: Zaina Martinez  : 1965  MRN: [id number]  Primary Care Physician:  Provider, No Known  Date of admission: 2023    Subjective     Interval History:   Follow up ESRD.  Still with low grade fever.  Appreciate Dr. Sammy jade.  Nauseated, dry heaving. Not eating. Bowels loose.     Review of Systems:   As noted above    Objective     Vitals:   Temp:  [97.3 °F (36.3 °C)-100.5 °F (38.1 °C)] 100.3 °F (37.9 °C)  Heart Rate:  [79-86] 85  Resp:  [16-18] 16  BP: (120-160)/(68-87) 138/69    Intake/Output Summary (Last 24 hours) at 2023 1013  Last data filed at 2023 0655  Gross per 24 hour   Intake 560 ml   Output 3000 ml   Net -2440 ml       Physical Exam:    General Appearance: Chronically ill .  Dry heaving.   Skin: warm and dry  HEENT:oral mucosa very dry.   Neck:RIJ TDC. Tunnel non-tender.   Lungs: decreased bs left lower lobe.   Heart: RRR, no s3 or rub  Abdomen: soft, nontender, nondistended  Extremities: no edema, cyanosis or clubbing  Neuro: flat affect. Slow to answer. Moves all 4 ext.   Scheduled Meds:     amLODIPine, 10 mg, Oral, Q24H  aspirin, 81 mg, Oral, Daily  carvedilol, 6.25 mg, Oral, BID With Meals  desvenlafaxine, 25 mg, Oral, Daily  doxycycline, 100 mg, Oral, Q12H  folic acid, 1 mg, Oral, Daily  heparin (porcine), 5,000 Units, Subcutaneous, Q8H  insulin glargine, 3 Units, Subcutaneous, Nightly  insulin lispro, 0-9 Units, Subcutaneous, TID With Meals  lactulose, 10 g, Oral, BID  levETIRAcetam, 250 mg, Oral, BID  levoFLOXacin, 250 mg, Oral, Q24H  levothyroxine, 300 mcg, Oral, Q AM  lidocaine, 1 patch, Transdermal, Q24H  lisinopril, 20 mg, Oral, Nightly  pantoprazole, 40 mg, Oral, Daily  rOPINIRole, 2 mg, Oral, Nightly  sertraline, 50 mg, Oral, Daily  sodium phosphate IVPB, 10 mmol, Intravenous, Once      IV Meds:        Results Reviewed:   I have personally reviewed the results from the time of this admission to  2/8/2023 10:13 EST     Results from last 7 days   Lab Units 02/08/23  0537 02/06/23  0504 02/05/23  1352 02/03/23  0723 02/01/23  1109   SODIUM mmol/L 129* 127* 131*   < > 133*   POTASSIUM mmol/L 3.5 4.5 4.3   < > 3.7   CHLORIDE mmol/L 99 95* 97*   < > 96*   CO2 mmol/L 22.0 22.0 20.1*   < > 26.0   BUN mg/dL 14 16 14   < > 25*   CREATININE mg/dL 2.74* 2.94* 2.68*   < > 3.13*   CALCIUM mg/dL 8.2* 8.3* 8.3*   < > 8.2*   BILIRUBIN mg/dL  --   --  0.5  --  0.6   ALK PHOS U/L  --   --  176*  --  226*   ALT (SGPT) U/L  --   --  <5  --  <5   AST (SGOT) U/L  --   --  13  --  12   GLUCOSE mg/dL 96 106* 116*   < > 190*    < > = values in this interval not displayed.       Estimated Creatinine Clearance: 1.9 mL/min (A) (by C-G formula based on SCr of 2.74 mg/dL (H)).    Results from last 7 days   Lab Units 02/08/23  0537 02/04/23  0626 02/03/23  0723   PHOSPHORUS mg/dL 1.5* 2.0* 2.0*             Results from last 7 days   Lab Units 02/08/23  0537 02/07/23  0833 02/06/23  0504 02/05/23  1352 02/04/23  0626   WBC 10*3/mm3 11.97* 12.29* 12.64* 15.62* 11.81*   HEMOGLOBIN g/dL 8.7* 8.6* 9.1* 9.8* 9.7*   PLATELETS 10*3/mm3 237 231 245 257 275             Assessment / Plan     ASSESSMENT:  1. ESRD.  Dialysis tomorrow. Replace Phos. On regular diet .  2. Fever with cough. On levaquin, doxycycline.  2 blood cultures sent on dialysis 2/7 negative so far.   She has multiple ongoing potential sources including TDC , pleural effusion, renal hematoma.  Having loose stools on lactulose.   3. Generalized weakness and failure to thrive with multiple hospitalizations.   4. Chronic systolic heart failure.   5. Hypothyroid on replacement.  Synthroid dose increased last admission and required IV synthroid.   6. Recent right femur fracture repair 11/10/22.   7. Left renal hematoma after left ureteral stent 12/30 .  Left renal artery embolization 1/1/23. Cysto, left stent removal 1/10/23.   8. Anemia CKD and blood loss.  9. DM2  10. History of  CVA  11. Left 5th rib fracture .    PLAN:  1. Will DW Dr. Christianson.  Would favor going ahead with CT. Could do chest abdomen pelvis.  Had pleural effusion on left on CXR.   2. Dialysis tomorrow  3. Change lactulose to prn .   Ruth Lira MD  02/08/23  10:13 [facility]    [facility]  [phone]

## 2023-02-08 NOTE — PROGRESS NOTES
ID note for fever in adult    Subjective: No new complaints.  She thinks her rheumatoid might be bothering her some.  Tmax of 100.5    Objective:   Temp:  [97.3 °F (36.3 °C)-100.5 °F (38.1 °C)] 100 °F (37.8 °C)  Heart Rate:  [79-86] 86  Resp:  [16-18] 18  BP: (120-163)/(68-87) 147/81  Chronically ill-appearing, nontoxic, abdomen soft, pulm effort normal, no lower extremity edema, no rashes.    White count 11.97, hemoglobin 8.7, platelets 237  Creatinine 2.7     2/1 blood cultures negative, respiratory pathogen panel negative  2/2 C. difficile negative  2/5 respiratory pathogen panel negative  2/5 blood cultures negative  2/7 blood cultures negative     Assessment and plan  1.  End-stage renal disease  2.  Fever in adult  3.  Rheumatoid arthritis, off therapy  4.  Left renal hematoma    Wide differential for fever including infectious etiology such as bacteremia, pleural effusion, intra-abdominal abscess and noninfectious etiologies including rheumatoid arthritis, hematoma or DVT.      Repeat blood cultures so far no growth to date.  Nontender over dialysis line  Duplex venography pending.  If spikes again over 101 and no source found will likely proceed with CT abdomen pelvis

## 2023-02-08 NOTE — PROGRESS NOTES
"[facility] f/u d/t anxiety. Chart reviewed, spoke w/ RN and met w/ pt. RN reports pt not feeling well today. Pt awake in bed upon entry, A&Ox4. She rates both anxiety and depression as a \"9\" today. She denies SI/HI/AVTH/WTBD currently. Pt was tearful stating \"they aren't doing anything for me that I ask\" but did not elaborate. Pt reports not wanting to speak any more today d/t not feeling well.   [facility] following.   "

## 2023-02-08 NOTE — SIGNIFICANT NOTE
02/08/23 1559   OTHER   Discipline physical therapist   Rehab Time/Intention   Session Not Performed other (see comments);patient/family declined, not feeling well  (pt not feeling well this afternoon, declines therapy at this time. will follow up tomorrow as appropriate. Discussed w RN.)   Recommendation   PT - Next Appointment 02/09/23

## 2023-02-08 NOTE — PLAN OF CARE
Goal Outcome Evaluation:  Plan of Care Reviewed With: patient        Progress: no change  Outcome Evaluation: vss, c/o pain medicated with x1 dose of norco, refused tylenol says not working, HD yesterday, bed alrm for safety, on po doxycylline and levaquin, multiple loose stool medicated with loperamide, plan to d/c to rehab, continue to monitor the pt.

## 2023-02-08 NOTE — CASE MANAGEMENT/SOCIAL WORK
Continued Stay Note  [facility]     Patient Name: Zaina Martinez  MRN: [id number]  Today's Date: 2/8/2023    Admit Date: 2/1/2023    Plan: [facility]- pre cert approved until 2/10 11:45PM   Discharge Plan     Row Name 02/08/23 1119       Plan    Plan [facility]- pre cert approved until 2/10 11:45PM    Patient/Family in Agreement with Plan yes    Plan Comments CCP spoke with Gina/Giuseppe, patient's pre cert was approved and valid through 2/10 11:45PM. CCP noted patient not stable for discharge and fever work up is ordered today. CCP will continue to follow. Facility pharmacy updated and packet in CCP office. Patient likely needs transport arranged. Norm JUNG RN CCP               Discharge Codes    No documentation.               Expected Discharge Date and Time     Expected Discharge Date Expected Discharge Time    Feb 8, 2023             Norm Pino RN

## 2023-02-08 NOTE — PROGRESS NOTES
Name: Zaina Martinez ADMIT: 2023   : 1965  PCP: Provider, No Known    MRN: [id number] LOS: 1 days   AGE/SEX: 57 y.o. female  ROOM: [facility]     Subjective   Subjective   Feels ill. C/O HA, throat discomfort.decreased appetite. No n/v/d. Occasional cough. Denies chest pain, soa.     Review of Systems   Constitutional: Positive for appetite change, fatigue and fever.   HENT: Positive for sore throat.    Respiratory: Positive for cough.    Cardiovascular: Negative for chest pain.   Gastrointestinal: Negative for diarrhea, nausea and vomiting.   Genitourinary: Negative for dysuria.   Musculoskeletal: Negative for arthralgias.   Skin: Negative for rash.   Neurological: Positive for headaches.   Psychiatric/Behavioral: Negative for sleep disturbance.        Objective   Objective   Vital Signs  Temp:  [98 °F (36.7 °C)-100.5 °F (38.1 °C)] 100.3 °F (37.9 °C)  Heart Rate:  [79-86] 85  Resp:  [16-18] 16  BP: (138-160)/(68-81) 138/69  SpO2:  [95 %-98 %] 95 %  on   ;   Device (Oxygen Therapy): room air  Body mass index is 2.1 kg/m².  Physical Exam  Vitals and nursing note reviewed.   Constitutional:       General: She is not in acute distress.     Appearance: She is ill-appearing.   HENT:      Head: Normocephalic.      Mouth/Throat:      Mouth: Mucous membranes are moist.   Eyes:      Conjunctiva/sclera: Conjunctivae normal.   Cardiovascular:      Rate and Rhythm: Normal rate and regular rhythm.   Pulmonary:      Effort: Pulmonary effort is normal. No respiratory distress.      Breath sounds: No wheezing or rales.   Abdominal:      General: Bowel sounds are normal.      Palpations: Abdomen is soft.   Musculoskeletal:      Cervical back: Neck supple.      Right lower leg: No edema.      Left lower leg: No edema.   Skin:     General: Skin is warm and dry.   Neurological:      Mental Status: She is alert. Mental status is at baseline.   Psychiatric:         Mood and Affect: Mood normal.         Behavior: Behavior  normal.       Results Review     I reviewed the patient's new clinical results.  Results from last 7 days   Lab Units 02/08/23  0537 02/07/23  0833 02/06/23  0504 02/05/23  1352   WBC 10*3/mm3 11.97* 12.29* 12.64* 15.62*   HEMOGLOBIN g/dL 8.7* 8.6* 9.1* 9.8*   PLATELETS 10*3/mm3 237 231 245 257     Results from last 7 days   Lab Units 02/08/23  0537 02/06/23  0504 02/05/23  1352 02/04/23  0626   SODIUM mmol/L 129* 127* 131* 128*   POTASSIUM mmol/L 3.5 4.5 4.3 4.2   CHLORIDE mmol/L 99 95* 97* 95*   CO2 mmol/L 22.0 22.0 20.1* 21.0*   BUN mg/dL 14 16 14 21*   CREATININE mg/dL 2.74* 2.94* 2.68* 2.59*   GLUCOSE mg/dL 96 106* 116* 150*   EGFR mL/min/1.73 19.6* 18.1* 20.2* 21.0*     Results from last 7 days   Lab Units 02/08/23  0537 02/05/23  1352 02/04/23  0626 02/03/23  0723   ALBUMIN g/dL 2.5* 2.7* 3.0* 2.9*   BILIRUBIN mg/dL  --  0.5  --   --    ALK PHOS U/L  --  176*  --   --    AST (SGOT) U/L  --  13  --   --    ALT (SGPT) U/L  --  <5  --   --      Results from last 7 days   Lab Units 02/08/23  0537 02/06/23  0504 02/05/23  1352 02/04/23  0626 02/03/23  0723   CALCIUM mg/dL 8.2* 8.3* 8.3* 8.2* 8.2*   ALBUMIN g/dL 2.5*  --  2.7* 3.0* 2.9*   PHOSPHORUS mg/dL 1.5*  --   --  2.0* 2.0*     Results from last 7 days   Lab Units 02/05/23  1352   PROCALCITONIN ng/mL 0.68*     Glucose   Date/Time Value Ref Range Status   02/08/2023 1103 74 70 - 130 mg/dL Final     Comment:     Meter: [id number] : [id number] Jessica Bello NA   02/08/2023 0650 80 70 - 130 mg/dL Final     Comment:     Meter: [id number] : [id number] Silvano Mckeon NA   02/07/2023 2137 178 (H) 70 - 130 mg/dL Final     Comment:     Meter: [id number] : [id number] Silvano Mckeon NA   02/07/2023 1622 97 70 - 130 mg/dL Final     Comment:     Meter: [id number] : [id number] Jose Johnson [facility]   02/07/2023 1232 85 70 - 130 mg/dL Final     Comment:     Meter: [id number] : [id number] Jose Johnson A   02/07/2023 0555 88 70 - 130 mg/dL Final     Comment:      Meter: [id number] : [id number] Aureliano Nunn RN   02/06/2023 2207 103 70 - 130 mg/dL Final     Comment:     Meter: [id number] : [id number] Aureliano Nunn RN       No radiology results for the last day  I have personally reviewed all medications:  Scheduled Medications  amLODIPine, 10 mg, Oral, Q24H  aspirin, 81 mg, Oral, Daily  carvedilol, 6.25 mg, Oral, BID With Meals  desvenlafaxine, 25 mg, Oral, Daily  doxycycline, 100 mg, Oral, Q12H  folic acid, 1 mg, Oral, Daily  heparin (porcine), 5,000 Units, Subcutaneous, Q8H  insulin glargine, 3 Units, Subcutaneous, Nightly  insulin lispro, 0-9 Units, Subcutaneous, TID With Meals  levETIRAcetam, 250 mg, Oral, BID  levoFLOXacin, 250 mg, Oral, Q24H  levothyroxine, 300 mcg, Oral, Q AM  lidocaine, 1 patch, Transdermal, Q24H  lisinopril, 20 mg, Oral, Nightly  pantoprazole, 40 mg, Oral, Daily  rOPINIRole, 2 mg, Oral, Nightly  sodium phosphate IVPB, 10 mmol, Intravenous, Once    Infusions   Diet  Diet: Regular/House Diet; Texture: Regular Texture (IDDSI 7); Fluid Consistency: Thin (IDDSI 0)    I have personally reviewed:  [x]  Laboratory   [x]  Microbiology   [x]  Radiology   [x]  EKG/Telemetry  [x]  Cardiology/Vascular   [x]  Pathology    [x]  Records       Assessment/Plan     Active Hospital Problems    Diagnosis  POA   • **Generalized weakness [R53.1]  Yes   • Severe malnutrition ([facility]) [E43]  Yes   • Pyuria [R82.81]  Yes   • Chronic systolic CHF (congestive heart failure) ([facility]) [I50.22]  Yes   • History of stroke- R MCA s/p TPA with subsequent ICH with debility [Z86.73]  Not Applicable   • ESRD (end stage renal disease) ([facility]) [N18.6]  Yes   • Hypothyroidism [E03.9]  Yes   • Hypertensive disorder [I10]  Yes   • Type 2 diabetes mellitus with kidney complication, with long-term current use of insulin ([facility]) [E11.29, Z79.4]  Not Applicable      Resolved Hospital Problems   No resolved problems to display.       57 y.o. female admitted with Generalized  weakness.    • Continues w/intermittent fevers. On antibiotics for LLL pneumonia on CXR. RVP neg x 2 this admission.  Her procalcitonin is a little bit elevated, but this may be due to her renal disease. +MRSA screen. BC NGTD; repeat also negative. ID following. Check BLE dopplers, CT C/A/P   • Generalized weakness-this is the patient's 18th admission in 13 months. She has refused rehab in the past, but is agreeable this time and her  cannot care for her at home. CCP is working on getting her set up with a nursing facility; has precert but not discharging due to fever workup  • Pyuria-asymptomatic. ID recommended monitoring off abx previously. Remains without LUTS  • ESRD on HD-nephrology is consulted for routine dialysis  • Chronic systolic heart failure with recovered EF-on coreg. Started on lisinopril this admission. Cannot tolerate SGLT2i due to her ESRD   • Type 2 diabetes on insulin-a1c 6.2. glucose is adequately controlled. Continue home regimen and sliding scale. Inconsistent with po intake, monitor closely, avoid hypoglycemia  • Hypertension-adequate control acutely  • Hypothyroidism-synthroid. She did have trouble correcting her TSH despite supplementation for a month during her prior hospitalization and required IV synthroid. Free t4 here is normal  • Rheumatoid arthritis-previously on chronic steroids, but these appear to have been stopped several months ago. AM cortisol was normal  • Paroxysmal afib-not a candidate for AC given recent hemorrhagic shock. On asa and coreg  • Seizure disorder-keppra  • GERD-ppi  • History of CVA  • Recent right femur fracture  • History of left renal hematoma causing life-threatening hemorrhagic shock s/p left renal artery embolization on 1/1/23-no longer a candidate for AC  • Anxiety-prn ativan. Depression: pristiq.  Psychiatry is following       · Heparin SC for DVT prophylaxis.  · Full code.  · Discussed with patient.  · Anticipate discharge to SNU facility when  cleared by consultants.      RIKA William  [facility]  02/08/23  13:15 EST

## 2023-02-09 LAB
ALBUMIN SERPL-MCNC: 2.6 G/DL (ref 3.5–5.2)
ALBUMIN/GLOB SERPL: 0.8 G/DL
ALP SERPL-CCNC: 153 U/L (ref 39–117)
ALT SERPL W P-5'-P-CCNC: 7 U/L (ref 1–33)
ANION GAP SERPL CALCULATED.3IONS-SCNC: 12.2 MMOL/L (ref 5–15)
AST SERPL-CCNC: 13 U/L (ref 1–32)
BACTERIA UR QL AUTO: ABNORMAL /HPF
BILIRUB SERPL-MCNC: 0.4 MG/DL (ref 0–1.2)
BILIRUB UR QL STRIP: NEGATIVE
BUN SERPL-MCNC: 19 MG/DL (ref 6–20)
BUN/CREAT SERPL: 6 (ref 7–25)
CALCIUM SPEC-SCNC: 8.2 MG/DL (ref 8.6–10.5)
CHLORIDE SERPL-SCNC: 97 MMOL/L (ref 98–107)
CLARITY UR: ABNORMAL
CO2 SERPL-SCNC: 19.8 MMOL/L (ref 22–29)
COLOR UR: ABNORMAL
CREAT SERPL-MCNC: 3.18 MG/DL (ref 0.57–1)
EGFRCR SERPLBLD CKD-EPI 2021: 16.4 ML/MIN/1.73
GLOBULIN UR ELPH-MCNC: 3.3 GM/DL
GLUCOSE BLDC GLUCOMTR-MCNC: 113 MG/DL (ref 70–130)
GLUCOSE BLDC GLUCOMTR-MCNC: 128 MG/DL (ref 70–130)
GLUCOSE BLDC GLUCOMTR-MCNC: 81 MG/DL (ref 70–130)
GLUCOSE BLDC GLUCOMTR-MCNC: 96 MG/DL (ref 70–130)
GLUCOSE SERPL-MCNC: 108 MG/DL (ref 65–99)
GLUCOSE UR STRIP-MCNC: NEGATIVE MG/DL
HGB UR QL STRIP.AUTO: ABNORMAL
HYALINE CASTS UR QL AUTO: ABNORMAL /LPF
KETONES UR QL STRIP: ABNORMAL
LEUKOCYTE ESTERASE UR QL STRIP.AUTO: ABNORMAL
NITRITE UR QL STRIP: NEGATIVE
PH UR STRIP.AUTO: 7 [PH] (ref 5–8)
PHOSPHATE SERPL-MCNC: 2.5 MG/DL (ref 2.5–4.5)
POTASSIUM SERPL-SCNC: 3.7 MMOL/L (ref 3.5–5.2)
PROT SERPL-MCNC: 5.9 G/DL (ref 6–8.5)
PROT UR QL STRIP: ABNORMAL
RBC # UR STRIP: ABNORMAL /HPF
REF LAB TEST METHOD: ABNORMAL
SODIUM SERPL-SCNC: 129 MMOL/L (ref 136–145)
SP GR UR STRIP: 1.02 (ref 1–1.03)
SQUAMOUS #/AREA URNS HPF: ABNORMAL /HPF
UROBILINOGEN UR QL STRIP: ABNORMAL
WBC # UR STRIP: ABNORMAL /HPF

## 2023-02-09 PROCEDURE — 87186 SC STD MICRODIL/AGAR DIL: CPT | Performed by: INTERNAL MEDICINE

## 2023-02-09 PROCEDURE — 96376 TX/PRO/DX INJ SAME DRUG ADON: CPT

## 2023-02-09 PROCEDURE — 99214 OFFICE O/P EST MOD 30 MIN: CPT | Performed by: INTERNAL MEDICINE

## 2023-02-09 PROCEDURE — 25010000002 HEPARIN (PORCINE) PER 1000 UNITS: Performed by: STUDENT IN AN ORGANIZED HEALTH CARE EDUCATION/TRAINING PROGRAM

## 2023-02-09 PROCEDURE — 96372 THER/PROPH/DIAG INJ SC/IM: CPT

## 2023-02-09 PROCEDURE — G0257 UNSCHED DIALYSIS ESRD PT HOS: HCPCS

## 2023-02-09 PROCEDURE — 80053 COMPREHEN METABOLIC PANEL: CPT | Performed by: INTERNAL MEDICINE

## 2023-02-09 PROCEDURE — 81001 URINALYSIS AUTO W/SCOPE: CPT | Performed by: INTERNAL MEDICINE

## 2023-02-09 PROCEDURE — G0378 HOSPITAL OBSERVATION PER HR: HCPCS

## 2023-02-09 PROCEDURE — 25010000002 ONDANSETRON PER 1 MG: Performed by: STUDENT IN AN ORGANIZED HEALTH CARE EDUCATION/TRAINING PROGRAM

## 2023-02-09 PROCEDURE — 87086 URINE CULTURE/COLONY COUNT: CPT | Performed by: INTERNAL MEDICINE

## 2023-02-09 PROCEDURE — 25010000002 HEPARIN (PORCINE) PER 1000 UNITS: Performed by: HOSPITALIST

## 2023-02-09 PROCEDURE — 82962 GLUCOSE BLOOD TEST: CPT

## 2023-02-09 PROCEDURE — 87077 CULTURE AEROBIC IDENTIFY: CPT | Performed by: INTERNAL MEDICINE

## 2023-02-09 PROCEDURE — 84100 ASSAY OF PHOSPHORUS: CPT | Performed by: INTERNAL MEDICINE

## 2023-02-09 RX ADMIN — DESVENLAFAXINE SUCCINATE 25 MG: 25 TABLET, EXTENDED RELEASE ORAL at 12:51

## 2023-02-09 RX ADMIN — HEPARIN SODIUM 3800 UNITS: 1000 INJECTION INTRAVENOUS; SUBCUTANEOUS at 11:30

## 2023-02-09 RX ADMIN — ROPINIROLE 2 MG: 2 TABLET, FILM COATED ORAL at 20:58

## 2023-02-09 RX ADMIN — ASPIRIN 81 MG: 81 TABLET, COATED ORAL at 12:33

## 2023-02-09 RX ADMIN — LORAZEPAM 0.5 MG: 0.5 TABLET ORAL at 05:23

## 2023-02-09 RX ADMIN — LEVETIRACETAM 250 MG: 250 TABLET, FILM COATED ORAL at 12:49

## 2023-02-09 RX ADMIN — ACETAMINOPHEN 650 MG: 325 TABLET, FILM COATED ORAL at 03:07

## 2023-02-09 RX ADMIN — ONDANSETRON 4 MG: 2 INJECTION INTRAMUSCULAR; INTRAVENOUS at 22:17

## 2023-02-09 RX ADMIN — AMLODIPINE BESYLATE 10 MG: 10 TABLET ORAL at 12:49

## 2023-02-09 RX ADMIN — ACETAMINOPHEN 650 MG: 325 TABLET, FILM COATED ORAL at 20:58

## 2023-02-09 RX ADMIN — INSULIN GLARGINE-YFGN 3 UNITS: 100 INJECTION, SOLUTION SUBCUTANEOUS at 22:00

## 2023-02-09 RX ADMIN — LORAZEPAM 0.5 MG: 0.5 TABLET ORAL at 14:33

## 2023-02-09 RX ADMIN — LEVETIRACETAM 250 MG: 250 TABLET, FILM COATED ORAL at 20:58

## 2023-02-09 RX ADMIN — HEPARIN SODIUM 5000 UNITS: 5000 INJECTION INTRAVENOUS; SUBCUTANEOUS at 18:18

## 2023-02-09 RX ADMIN — HEPARIN SODIUM 5000 UNITS: 5000 INJECTION INTRAVENOUS; SUBCUTANEOUS at 01:09

## 2023-02-09 RX ADMIN — FOLIC ACID 1 MG: 1 TABLET ORAL at 12:51

## 2023-02-09 RX ADMIN — HEPARIN SODIUM 5000 UNITS: 5000 INJECTION INTRAVENOUS; SUBCUTANEOUS at 12:51

## 2023-02-09 RX ADMIN — PANTOPRAZOLE SODIUM 40 MG: 40 TABLET, DELAYED RELEASE ORAL at 12:33

## 2023-02-09 RX ADMIN — LEVOTHYROXINE SODIUM 300 MCG: 0.15 TABLET ORAL at 05:08

## 2023-02-09 RX ADMIN — LORAZEPAM 0.5 MG: 0.5 TABLET ORAL at 22:14

## 2023-02-09 RX ADMIN — CARVEDILOL 6.25 MG: 6.25 TABLET, FILM COATED ORAL at 18:18

## 2023-02-09 RX ADMIN — LISINOPRIL 20 MG: 20 TABLET ORAL at 20:58

## 2023-02-09 NOTE — PROGRESS NOTES
Name: Zaina Martinez ADMIT: 2023   : 1965  PCP: Provider, No Known    MRN: [id number] LOS: 1 days   AGE/SEX: 57 y.o. female  ROOM: [facility]     Subjective   Subjective   Seen after dialysis. C/O feeling cold, fatigued. Appetite remains decreased. Temp up to 100.8 yesterday evening, 100.6 this a.m.     Review of Systems   Constitutional: Positive for appetite change and fatigue.   HENT: Negative for congestion.    Respiratory: Negative for shortness of breath.    Cardiovascular: Negative for chest pain.   Gastrointestinal: Negative for constipation.   Genitourinary: Negative for dysuria.   Musculoskeletal: Negative for myalgias.   Skin: Negative for rash.   Neurological: Positive for weakness. Negative for headaches.   Psychiatric/Behavioral: Negative for sleep disturbance.        Objective   Objective   Vital Signs  Temp:  [98.7 °F (37.1 °C)-100.8 °F (38.2 °C)] 98.8 °F (37.1 °C)  Heart Rate:  [76-91] 91  Resp:  [16] 16  BP: (142-177)/(74-87) 177/87  SpO2:  [96 %-100 %] 100 %  on   ;   Device (Oxygen Therapy): room air  Body mass index is 2.1 kg/m².  Physical Exam  Vitals and nursing note reviewed.   Constitutional:       General: She is not in acute distress.     Appearance: She is ill-appearing.   HENT:      Head: Normocephalic.      Mouth/Throat:      Mouth: Mucous membranes are moist.   Eyes:      Conjunctiva/sclera: Conjunctivae normal.   Cardiovascular:      Rate and Rhythm: Normal rate and regular rhythm.   Pulmonary:      Effort: Pulmonary effort is normal. No respiratory distress.      Breath sounds: Decreased breath sounds present.      Comments: On room air  Abdominal:      General: Bowel sounds are normal.      Palpations: Abdomen is soft.   Musculoskeletal:      Cervical back: Neck supple.      Right lower leg: No edema.      Left lower leg: No edema.   Skin:     General: Skin is warm and dry.      Coloration: Skin is pale.   Neurological:      Mental Status: She is alert. Mental status is at  baseline.   Psychiatric:         Mood and Affect: Mood is depressed. Affect is flat.       Results Review     I reviewed the patient's new clinical results.  Results from last 7 days   Lab Units 02/08/23  0537 02/07/23  0833 02/06/23  0504 02/05/23  1352   WBC 10*3/mm3 11.97* 12.29* 12.64* 15.62*   HEMOGLOBIN g/dL 8.7* 8.6* 9.1* 9.8*   PLATELETS 10*3/mm3 237 231 245 257     Results from last 7 days   Lab Units 02/09/23  0542 02/08/23  0537 02/06/23  0504 02/05/23  1352   SODIUM mmol/L 129* 129* 127* 131*   POTASSIUM mmol/L 3.7 3.5 4.5 4.3   CHLORIDE mmol/L 97* 99 95* 97*   CO2 mmol/L 19.8* 22.0 22.0 20.1*   BUN mg/dL 19 14 16 14   CREATININE mg/dL 3.18* 2.74* 2.94* 2.68*   GLUCOSE mg/dL 108* 96 106* 116*   EGFR mL/min/1.73 16.4* 19.6* 18.1* 20.2*     Results from last 7 days   Lab Units 02/09/23  0542 02/08/23  0537 02/05/23  1352 02/04/23  0626   ALBUMIN g/dL 2.6* 2.5* 2.7* 3.0*   BILIRUBIN mg/dL 0.4  --  0.5  --    ALK PHOS U/L 153*  --  176*  --    AST (SGOT) U/L 13  --  13  --    ALT (SGPT) U/L 7  --  <5  --      Results from last 7 days   Lab Units 02/09/23  0542 02/08/23  0537 02/06/23  0504 02/05/23  1352 02/04/23  0626 02/03/23  0723   CALCIUM mg/dL 8.2* 8.2* 8.3* 8.3* 8.2* 8.2*   ALBUMIN g/dL 2.6* 2.5*  --  2.7* 3.0* 2.9*   PHOSPHORUS mg/dL 2.5 1.5*  --   --  2.0* 2.0*     Results from last 7 days   Lab Units 02/05/23  1352   PROCALCITONIN ng/mL 0.68*     Glucose   Date/Time Value Ref Range Status   02/09/2023 1204 81 70 - 130 mg/dL Final     Comment:     Meter: [id number] : [id number] Daniel JUNG RN   02/09/2023 0613 113 70 - 130 mg/dL Final     Comment:     Meter: [id number] : [id number] Juan Becerril [facility]   02/08/2023 2100 95 70 - 130 mg/dL Final     Comment:     Meter: [id number] : [id number] Juan VALDEZA   02/08/2023 1607 75 70 - 130 mg/dL Final     Comment:     Meter: [id number] : [id number] Jessica SAAVEDRA   02/08/2023 1103 74 70 - 130 mg/dL Final     Comment:     Meter:  [id number] : [id number] Jessica Bello NA   02/08/2023 0650 80 70 - 130 mg/dL Final     Comment:     Meter: [id number] : [id number] Silvano SAAVEDRA   02/07/2023 2137 178 (H) 70 - 130 mg/dL Final     Comment:     Meter: [id number] : [id number] Silvano SAAVEDRA       CT Abdomen Pelvis Without Contrast    Result Date: 2/9/2023   1. Left basilar consolidation. Some of this may be atelectasis. Pneumonia is not excluded. 2. Previously described left perinephric hematoma appears larger than on the study. 3. Thick-walled appearance to urinary bladder. 4. Potentially thick-walled appearance to the rectum. Correlation with any symptoms of proctitis is suggested.  Radiation dose reduction techniques were utilized, including automated exposure control and exposure modulation based on body size.  This report was finalized on 2/9/2023 12:38 AM by Dr. Cherri Rodriugez M.D.      CT Chest Without Contrast Diagnostic    Result Date: 2/9/2023   1. Left basilar consolidation. Some of this may be atelectasis. Pneumonia is not excluded. 2. Previously described left perinephric hematoma appears larger than on the study. 3. Thick-walled appearance to urinary bladder. 4. Potentially thick-walled appearance to the rectum. Correlation with any symptoms of proctitis is suggested.  Radiation dose reduction techniques were utilized, including automated exposure control and exposure modulation based on body size.  This report was finalized on 2/9/2023 12:38 AM by Dr. Cherri Rodriguez M.D.      I have personally reviewed all medications:  Scheduled Medications  amLODIPine, 10 mg, Oral, Q24H  aspirin, 81 mg, Oral, Daily  carvedilol, 6.25 mg, Oral, BID With Meals  desvenlafaxine, 25 mg, Oral, Daily  folic acid, 1 mg, Oral, Daily  heparin (porcine), 5,000 Units, Subcutaneous, Q8H  insulin glargine, 3 Units, Subcutaneous, Nightly  insulin lispro, 0-9 Units, Subcutaneous, TID With Meals  levETIRAcetam, 250 mg, Oral, BID  levothyroxine, 300  mcg, Oral, Q AM  lidocaine, 1 patch, Transdermal, Q24H  lisinopril, 20 mg, Oral, Nightly  pantoprazole, 40 mg, Oral, Daily  rOPINIRole, 2 mg, Oral, Nightly    Infusions   Diet  Diet: Regular/House Diet; Texture: Regular Texture (IDDSI 7); Fluid Consistency: Thin (IDDSI 0)    I have personally reviewed:  [x]  Laboratory   [x]  Microbiology   [x]  Radiology   [x]  EKG/Telemetry  [x]  Cardiology/Vascular   [x]  Pathology    [x]  Records       Assessment/Plan     Active Hospital Problems    Diagnosis  POA   • **Generalized weakness [R53.1]  Yes   • Severe malnutrition ([facility]) [E43]  Yes   • Pyuria [R82.81]  Yes   • Chronic systolic CHF (congestive heart failure) ([facility]) [I50.22]  Yes   • History of stroke- R MCA s/p TPA with subsequent ICH with debility [Z86.73]  Not Applicable   • ESRD (end stage renal disease) ([facility]) [N18.6]  Yes   • Hypothyroidism [E03.9]  Yes   • Hypertensive disorder [I10]  Yes   • Type 2 diabetes mellitus with kidney complication, with long-term current use of insulin ([facility]) [E11.29, Z79.4]  Not Applicable      Resolved Hospital Problems   No resolved problems to display.       57 y.o. female admitted with Generalized weakness.    • Continues w/intermittent fevers. S/P antibiotics for LLL pneumonia on CXR. RVP neg x 2 this admission.  Her procalcitonin is a little bit elevated, but this may be due to her renal disease. +MRSA screen. BC NGTD; repeat also negative. ID has followed. BLE dopplers neg for DVT, did show hailey inguinal lymphadenopathy. CT C/A/P also completed showing lt basilar consolidation that may be atelectasis, larger lt perinephric hematoma, thick walled bladder and rectum. IC UA collected. Denies diarrhea/constipation, doubt proctitis but will monitor. Hgb has remained overall stable. Encouraged IS  • Generalized weakness-this is the patient's 18th admission in 13 months. She has refused rehab in the past, but is agreeable this time and her  cannot care for her at home. CCP is  working on getting her set up with a nursing facility; has precert but not discharging due to fever workup  • ESRD on HD-nephrology managing  • Chronic systolic heart failure with recovered EF-on coreg. Started on lisinopril this admission. Cannot tolerate SGLT2i due to her ESRD   • Type 2 diabetes on insulin-a1c 6.2. glucose is adequately controlled. Continue home regimen and sliding scale. Inconsistent with po intake, monitor closely, avoid hypoglycemia  • Hypertension-adequate control acutely  • Hypothyroidism-synthroid. She did have trouble correcting her TSH despite supplementation for a month during her prior hospitalization and required IV synthroid. Free t4 here is normal  • Rheumatoid arthritis-previously on chronic steroids, but these appear to have been stopped several months ago. AM cortisol was normal. Encourage increased time out of bed  • Paroxysmal afib-not a candidate for AC given recent hemorrhagic shock. On asa and coreg  • Seizure disorder-keppra  • GERD-ppi  • History of CVA  • Recent right femur fracture  • History of left renal hematoma causing life-threatening hemorrhagic shock s/p left renal artery embolization on 1/1/23-no longer a candidate for AC  • Anxiety-prn ativan. Depression: pristiq.  Psychiatry is following       · Heparin SC for DVT prophylaxis.  · Full code.  · Discussed with patient and nursing staff.  · Anticipate discharge to SNU facility when cleared by consultants.      RIKA William  [facility]  02/09/23  13:10 EST

## 2023-02-09 NOTE — PROGRESS NOTES
[facility] Progress Note      Patient Name: Zaina Martinez  : 1965  MRN: [id number]  Primary Care Physician:  Provider, No Known  Date of admission: 2023    Subjective     Interval History:   Follow up ESRD.  Still with low grade fever.  On dialysis.  Ate very little yesterday.  Still feels nauseated.  Dr. Christianson has stopped the po antibiotic.      Review of Systems:   As noted above    Objective     Vitals:   Temp:  [98.7 °F (37.1 °C)-100.8 °F (38.2 °C)] 100.6 °F (38.1 °C)  Heart Rate:  [76-81] 78  Resp:  [16] 16  BP: (142-156)/(74-82) 154/78    Intake/Output Summary (Last 24 hours) at 2023 0958  Last data filed at 2023 1804  Gross per 24 hour   Intake 0 ml   Output --   Net 0 ml       Physical Exam:    General Appearance: Chronically ill .  On dialysis.  Coughing.   Skin: warm and dry  HEENT:oral mucosa very dry. Mask replaced.   Neck:RIJ TDC. Tunnel non-tender.   Lungs: decreased bs left lower lobe.   Heart: RRR, no s3 or rub  Abdomen: soft, nontender, nondistended  Extremities: no edema.  Neuro: flat affect. Slow to answer. Moves all 4 ext.   Scheduled Meds:     amLODIPine, 10 mg, Oral, Q24H  aspirin, 81 mg, Oral, Daily  carvedilol, 6.25 mg, Oral, BID With Meals  desvenlafaxine, 25 mg, Oral, Daily  folic acid, 1 mg, Oral, Daily  heparin (porcine), 5,000 Units, Subcutaneous, Q8H  insulin glargine, 3 Units, Subcutaneous, Nightly  insulin lispro, 0-9 Units, Subcutaneous, TID With Meals  levETIRAcetam, 250 mg, Oral, BID  levothyroxine, 300 mcg, Oral, Q AM  lidocaine, 1 patch, Transdermal, Q24H  lisinopril, 20 mg, Oral, Nightly  pantoprazole, 40 mg, Oral, Daily  rOPINIRole, 2 mg, Oral, Nightly      IV Meds:        Results Reviewed:   I have personally reviewed the results from the time of this admission to 2023 09:58 EST     Results from last 7 days   Lab Units 23  0542 23  0537 23  0504 23  1352   SODIUM mmol/L 129* 129* 127* 131*    POTASSIUM mmol/L 3.7 3.5 4.5 4.3   CHLORIDE mmol/L 97* 99 95* 97*   CO2 mmol/L 19.8* 22.0 22.0 20.1*   BUN mg/dL 19 14 16 14   CREATININE mg/dL 3.18* 2.74* 2.94* 2.68*   CALCIUM mg/dL 8.2* 8.2* 8.3* 8.3*   BILIRUBIN mg/dL 0.4  --   --  0.5   ALK PHOS U/L 153*  --   --  176*   ALT (SGPT) U/L 7  --   --  <5   AST (SGOT) U/L 13  --   --  13   GLUCOSE mg/dL 108* 96 106* 116*       Estimated Creatinine Clearance: 1.6 mL/min (A) (by C-G formula based on SCr of 3.18 mg/dL (H)).    Results from last 7 days   Lab Units 02/09/23  0542 02/08/23  0537 02/04/23  0626   PHOSPHORUS mg/dL 2.5 1.5* 2.0*             Results from last 7 days   Lab Units 02/08/23  0537 02/07/23  0833 02/06/23  0504 02/05/23  1352 02/04/23  0626   WBC 10*3/mm3 11.97* 12.29* 12.64* 15.62* 11.81*   HEMOGLOBIN g/dL 8.7* 8.6* 9.1* 9.8* 9.7*   PLATELETS 10*3/mm3 237 231 245 257 275             Assessment / Plan     ASSESSMENT:  1. ESRD.  Dialysis tomorrow. Replace Phos. On regular diet .  2. Fever with cough. Completed levaquin, doxycycline.  2 blood cultures sent on dialysis 2/7 negative so far.   She has multiple ongoing potential sources including TDC , pleural effusion, renal hematoma.  The hematoma looked bigger on CT, but hg stable.   Bladder wall thick and possible proctitis on CT.  Stopped lactulose due to diarrhea.  Check Straight cath urine culture.   3. Generalized weakness and failure to thrive with multiple hospitalizations.   4. Chronic systolic heart failure.   5. Hypothyroid on replacement.  Synthroid dose increased last admission and required IV synthroid.   6. Recent right femur fracture repair.  11/10/22.  7. Left renal hematoma after left ureteral stent 12/30 . Appears larger on CT.  Left renal artery embolization 1/1/23. Cysto, left stent removal 1/10/23. Hg stable.   8. Anemia CKD and blood loss.  Hg stable despite hematoma looking larger on CT  9. DM2  10. History of CVA  11. Left 5th rib fracture .    PLAN:  1. Straight cath urine  culture.  2. Dialysis today.   Ruth Lira MD  02/09/23  09:58 EST    [facility]  [phone]

## 2023-02-09 NOTE — PROGRESS NOTES
Continued Stay Note  [facility]     Patient Name: Zaina Martinez  MRN: [id number]  Today's Date: 2/9/2023    Admit Date: 2/1/2023    Plan: [facility] with HD   Discharge Plan     Row Name 02/09/23 1514       Plan    Plan [facility] [place] HD    Plan Comments Per APRN poss DC tomorrow. Informed Gina with [facility]. Pre-cert is good until 2/10 at 11:45pm.               Discharge Codes    No documentation.               Expected Discharge Date and Time     Expected Discharge Date Expected Discharge Time    Feb 10, 2023             Liset Beckman RN

## 2023-02-09 NOTE — PROGRESS NOTES
ID note for fever in adult    Subjective: Feels tired. Tm 100.8    Objective:   Temp:  [98.7 °F (37.1 °C)-100.8 °F (38.2 °C)] 100.6 °F (38.1 °C)  Heart Rate:  [76-81] 78  Resp:  [16] 16  BP: (142-156)/(74-82) 154/78  Chronically ill-appearing, nontoxic, abdomen soft, pulm effort normal, no lower extremity edema, no rashes.       Creatinine 3.18     2/1 blood cultures negative, respiratory pathogen panel negative  2/2 C. difficile negative  2/5 respiratory pathogen panel negative  2/5 blood cultures negative  2/7 blood cultures negative     Assessment and plan  1.  End-stage renal disease  2.  Fever in adult  3.  Rheumatoid arthritis, off therapy  4.  Left renal hematoma    CT chest reviewed and has large left pleural effusion with what appears to be compressive atelectasis.  No clear infection but did show possible proctitis and enlarging hematoma  Bcx are negative  I don't see any definitive evidence of infection. I wonder if her atelectasis or hematoma might be causing her fevers or perhaps her RA now that she is off her chronic steroids?  She had been on 5d of abx with little influence in her temperatures. I think I would stop those now and monitor off abx.  If she develops more concerning signs for infection, I would be happy to reevaluate.

## 2023-02-09 NOTE — PLAN OF CARE
Goal Outcome Evaluation:  Plan of Care Reviewed With: patient        Progress: no change  Outcome Evaluation: Low grade fever today, other VSS, dialysis today which patient tolerated well, encouraged oral intake and IS use, accu checks w/ no sliding scale needed, ativan for anxiety, SL, mepilex on coccyx area, encouraged turns, falls, seizure, and contact precautions maintained. UA collected and sent to lab.

## 2023-02-09 NOTE — NURSING NOTE
[facility] follow-up.  Reviewed chart and spoke with RN who stated patient has been anxious and tearful.  Patient currently in dialysis.  She was recently switched to Pristiq from Zoloft.      Access following.

## 2023-02-09 NOTE — PROGRESS NOTES
In dialysis today; continuing Pristiq trial. Pt's renal status will dictate that she remain on 25 mg dose.

## 2023-02-09 NOTE — PROGRESS NOTES
The patient is more awake and alert and is oriented x2 when seen today.  He remains in restraints secondary to concerns about pulling his NG tube or lines.

## 2023-02-09 NOTE — PROGRESS NOTES
"Nutrition Services    Patient Name:  Zaina Martinez  YOB: 1965  MRN: [id number]  Admit Date:  2/1/2023    FOLLOW UP - CLINICAL NUTRITION    Assessment Date:  02/09/23    Encounter Information         Reason for Encounter Follow up MSA/MST 3    Current Issues Patient reported to be depressed,anxious and tearful by psych. Ate minimally, if anything, yesterday due to nausea and anxiety per nephrology. Abx d/c'd. Patient currently in dialysis, unavailable for interview. Cafe Mocha flavor of Novasource Renal is not available vanilla flavor to be sent instead. Will continue to follow      Current Nutrition Orders & Evaluation of Intake       Oral Nutrition     Current PO Diet Diet: Regular/House Diet; Texture: Regular Texture (IDDSI 7); Fluid Consistency: Thin (IDDSI 0)   Supplement Novasource Renal , TID   PO Evaluation     % PO Intake 0% x 3 meals    # of Days Evaluated 1    Factors Affecting Intake  emotional status, nausea   --  Anthropometrics          Height    Weight Height: 157.5 cm (62\")  Weight: 5.2 kg (11 lb 7.4 oz) (02/08/23 0645)    BMI kg/m2 Body mass index is 2.1 kg/m².    Weight trend Loss, Amount/Timeframe: 26# (20%) wt loss x 1 month    Nutrient needs  1773 kcal, 77g protein, fluid deferred to nephrologist      Labs        Pertinent Labs Reviewed, listed below     Results from last 7 days   Lab Units 02/09/23  0542 02/08/23  0537 02/06/23  0504 02/05/23  1352   SODIUM mmol/L 129* 129* 127* 131*   POTASSIUM mmol/L 3.7 3.5 4.5 4.3   CHLORIDE mmol/L 97* 99 95* 97*   CO2 mmol/L 19.8* 22.0 22.0 20.1*   BUN mg/dL 19 14 16 14   CREATININE mg/dL 3.18* 2.74* 2.94* 2.68*   CALCIUM mg/dL 8.2* 8.2* 8.3* 8.3*   BILIRUBIN mg/dL 0.4  --   --  0.5   ALK PHOS U/L 153*  --   --  176*   ALT (SGPT) U/L 7  --   --  <5   AST (SGOT) U/L 13  --   --  13   GLUCOSE mg/dL 108* 96 106* 116*     Results from last 7 days   Lab Units 02/09/23  0542 02/08/23  0537   PHOSPHORUS mg/dL 2.5 1.5*   HEMOGLOBIN g/dL  --  8.7* "   HEMATOCRIT %  --  27.6*   WBC 10*3/mm3  --  11.97*   ALBUMIN g/dL 2.6* 2.5*     Results from last 7 days   Lab Units 02/08/23  0537 02/07/23  0833 02/06/23  0504 02/05/23  1352 02/04/23  0626   PLATELETS 10*3/mm3 237 231 245 257 275     COVID19   Date Value Ref Range Status   02/05/2023 Not Detected Not Detected - Ref. Range Final     Lab Results   Component Value Date    HGBA1C 6.20 (H) 02/02/2023          Medications            Scheduled Medications amLODIPine, 10 mg, Oral, Q24H  aspirin, 81 mg, Oral, Daily  carvedilol, 6.25 mg, Oral, BID With Meals  desvenlafaxine, 25 mg, Oral, Daily  folic acid, 1 mg, Oral, Daily  heparin (porcine), 5,000 Units, Subcutaneous, Q8H  insulin glargine, 3 Units, Subcutaneous, Nightly  insulin lispro, 0-9 Units, Subcutaneous, TID With Meals  levETIRAcetam, 250 mg, Oral, BID  levothyroxine, 300 mcg, Oral, Q AM  lidocaine, 1 patch, Transdermal, Q24H  lisinopril, 20 mg, Oral, Nightly  pantoprazole, 40 mg, Oral, Daily  rOPINIRole, 2 mg, Oral, Nightly        Infusions      PRN Medications •  acetaminophen  •  dextrose  •  dextrose  •  glucagon (human recombinant)  •  heparin (porcine)  •  lactulose  •  loperamide  •  LORazepam  •  nitroglycerin  •  ondansetron **OR** ondansetron  •  sodium chloride     Physical Findings          Physical Appearance alert, loss of muscle mass, loss of subcutaneous fat, oriented   Oral/Mouth Cavity WNL   Edema  no edema   Gastrointestinal nausea, non-distended , last bowel movement:2/7   Skin  skin intact   Tubes/Drains none   NFPE Date Completed: 2/2   --  NUTRITION INTERVENTION / PLAN OF CARE  Intervention Goal         Intervention Goal(s) Reduce/improve symptoms, Meet estimated needs, Disease management/therapy, Tolerate PO , Increase intake and Maintain weight     Nutrition Intervention         RD Action Follow Tx Progress and Care plan reviewed     Nutrition Prescription         Diet Prescription     Supplement Prescription n/a   EN/PN Prescription     New Prescription Ordered? Continue same per protocol   --  Monitor/Evaluation        Monitor Per protocol, I&O, PO intake, Supplement intake, Pertinent labs, Weight, Skin status, GI status, Symptoms, POC/GOC   Discharge Needs Pending clinical course   Education Will instruct as appropriate   --    RD to follow up per protocol.    Electronically signed by:  Radha Pinzon RD  02/09/23 11:07 EST

## 2023-02-09 NOTE — NURSING NOTE
HD complete, no fluid removed per MD orders. Post /95 HR 88 T 98.4. CVC dressing changed and dated.

## 2023-02-09 NOTE — PLAN OF CARE
Goal Outcome Evaluation:  Plan of Care Reviewed With: patient        Progress: no change  Outcome Evaluation: Max temp 100.6 tonight, CT completed tonight, encouraged oral intake-iraida crackers and small bite of sandwich, blood sugars this shift- 95 and 113, c/o of anxiety-prn ativan given, tylenol given for pain control, SL, subq Heparin given as ordered, redness noted to coccyx/buttocks area-mepilex applied, accumax to bed, encouraged pt to turn in bed, brief, smear BM in brief, incontient of stool/urine, room air, bed alarm in use for pt safety, PO abx given as ordered

## 2023-02-10 LAB
BACTERIA SPEC AEROBE CULT: NORMAL
BACTERIA SPEC AEROBE CULT: NORMAL
BASOPHILS # BLD AUTO: 0.04 10*3/MM3 (ref 0–0.2)
BASOPHILS NFR BLD AUTO: 0.3 % (ref 0–1.5)
DEPRECATED RDW RBC AUTO: 47.4 FL (ref 37–54)
EOSINOPHIL # BLD AUTO: 0.04 10*3/MM3 (ref 0–0.4)
EOSINOPHIL NFR BLD AUTO: 0.3 % (ref 0.3–6.2)
ERYTHROCYTE [DISTWIDTH] IN BLOOD BY AUTOMATED COUNT: 16 % (ref 12.3–15.4)
GLUCOSE BLDC GLUCOMTR-MCNC: 102 MG/DL (ref 70–130)
GLUCOSE BLDC GLUCOMTR-MCNC: 119 MG/DL (ref 70–130)
GLUCOSE BLDC GLUCOMTR-MCNC: 205 MG/DL (ref 70–130)
GLUCOSE BLDC GLUCOMTR-MCNC: 53 MG/DL (ref 70–130)
GLUCOSE BLDC GLUCOMTR-MCNC: 74 MG/DL (ref 70–130)
HCT VFR BLD AUTO: 28.5 % (ref 34–46.6)
HGB BLD-MCNC: 9.1 G/DL (ref 12–15.9)
IMM GRANULOCYTES # BLD AUTO: 0.11 10*3/MM3 (ref 0–0.05)
IMM GRANULOCYTES NFR BLD AUTO: 0.8 % (ref 0–0.5)
LYMPHOCYTES # BLD AUTO: 1.27 10*3/MM3 (ref 0.7–3.1)
LYMPHOCYTES NFR BLD AUTO: 9.1 % (ref 19.6–45.3)
MCH RBC QN AUTO: 26.3 PG (ref 26.6–33)
MCHC RBC AUTO-ENTMCNC: 31.9 G/DL (ref 31.5–35.7)
MCV RBC AUTO: 82.4 FL (ref 79–97)
MONOCYTES # BLD AUTO: 1.58 10*3/MM3 (ref 0.1–0.9)
MONOCYTES NFR BLD AUTO: 11.3 % (ref 5–12)
NEUTROPHILS NFR BLD AUTO: 10.96 10*3/MM3 (ref 1.7–7)
NEUTROPHILS NFR BLD AUTO: 78.2 % (ref 42.7–76)
NRBC BLD AUTO-RTO: 0 /100 WBC (ref 0–0.2)
PLATELET # BLD AUTO: 269 10*3/MM3 (ref 140–450)
PMV BLD AUTO: 10 FL (ref 6–12)
RBC # BLD AUTO: 3.46 10*6/MM3 (ref 3.77–5.28)
WBC NRBC COR # BLD: 14 10*3/MM3 (ref 3.4–10.8)

## 2023-02-10 PROCEDURE — 25010000002 HEPARIN (PORCINE) PER 1000 UNITS: Performed by: STUDENT IN AN ORGANIZED HEALTH CARE EDUCATION/TRAINING PROGRAM

## 2023-02-10 PROCEDURE — 97530 THERAPEUTIC ACTIVITIES: CPT

## 2023-02-10 PROCEDURE — G0378 HOSPITAL OBSERVATION PER HR: HCPCS

## 2023-02-10 PROCEDURE — 96372 THER/PROPH/DIAG INJ SC/IM: CPT

## 2023-02-10 PROCEDURE — 85025 COMPLETE CBC W/AUTO DIFF WBC: CPT | Performed by: INTERNAL MEDICINE

## 2023-02-10 PROCEDURE — 97110 THERAPEUTIC EXERCISES: CPT

## 2023-02-10 PROCEDURE — 82962 GLUCOSE BLOOD TEST: CPT

## 2023-02-10 RX ADMIN — CARVEDILOL 6.25 MG: 6.25 TABLET, FILM COATED ORAL at 17:27

## 2023-02-10 RX ADMIN — LISINOPRIL 20 MG: 20 TABLET ORAL at 21:21

## 2023-02-10 RX ADMIN — DESVENLAFAXINE SUCCINATE 25 MG: 25 TABLET, EXTENDED RELEASE ORAL at 08:11

## 2023-02-10 RX ADMIN — ASPIRIN 81 MG: 81 TABLET, COATED ORAL at 09:12

## 2023-02-10 RX ADMIN — HEPARIN SODIUM 5000 UNITS: 5000 INJECTION INTRAVENOUS; SUBCUTANEOUS at 09:12

## 2023-02-10 RX ADMIN — INSULIN GLARGINE-YFGN 3 UNITS: 100 INJECTION, SOLUTION SUBCUTANEOUS at 21:21

## 2023-02-10 RX ADMIN — HEPARIN SODIUM 5000 UNITS: 5000 INJECTION INTRAVENOUS; SUBCUTANEOUS at 17:27

## 2023-02-10 RX ADMIN — LORAZEPAM 0.5 MG: 0.5 TABLET ORAL at 09:12

## 2023-02-10 RX ADMIN — CARVEDILOL 6.25 MG: 6.25 TABLET, FILM COATED ORAL at 08:11

## 2023-02-10 RX ADMIN — LEVOTHYROXINE SODIUM 300 MCG: 0.15 TABLET ORAL at 05:16

## 2023-02-10 RX ADMIN — LEVETIRACETAM 250 MG: 250 TABLET, FILM COATED ORAL at 21:21

## 2023-02-10 RX ADMIN — FOLIC ACID 1 MG: 1 TABLET ORAL at 09:12

## 2023-02-10 RX ADMIN — ROPINIROLE 2 MG: 2 TABLET, FILM COATED ORAL at 21:21

## 2023-02-10 RX ADMIN — PANTOPRAZOLE SODIUM 40 MG: 40 TABLET, DELAYED RELEASE ORAL at 09:12

## 2023-02-10 RX ADMIN — ACETAMINOPHEN 650 MG: 325 TABLET, FILM COATED ORAL at 09:12

## 2023-02-10 RX ADMIN — LEVETIRACETAM 250 MG: 250 TABLET, FILM COATED ORAL at 08:11

## 2023-02-10 RX ADMIN — AMLODIPINE BESYLATE 10 MG: 10 TABLET ORAL at 08:11

## 2023-02-10 RX ADMIN — LORAZEPAM 0.5 MG: 0.5 TABLET ORAL at 21:26

## 2023-02-10 RX ADMIN — HEPARIN SODIUM 5000 UNITS: 5000 INJECTION INTRAVENOUS; SUBCUTANEOUS at 02:39

## 2023-02-10 NOTE — PROGRESS NOTES
[facility] Progress Note      Patient Name: Zaina Martinez  : 1965  MRN: [id number]  Primary Care Physician:  Provider, No Known  Date of admission: 2023    Subjective     Interval History:   Follow up ESRD  Feels very tired; no shortness of breath on room air  Appetite mediocre  Fevers continue    Review of Systems:   As noted above    Objective     Vitals:   Temp:  [98.2 °F (36.8 °C)-100.8 °F (38.2 °C)] 98.3 °F (36.8 °C)  Heart Rate:  [74-92] 77  Resp:  [12-18] 16  BP: (146-169)/(70-82) 148/80    Intake/Output Summary (Last 24 hours) at 2/10/2023 1536  Last data filed at 2/10/2023 0926  Gross per 24 hour   Intake 300 ml   Output --   Net 300 ml       Physical Exam:    General Appearance: Chronically ill, NAD, pale  Skin: warm and dry  HEENT: oral mucosa dry, temporal wasting  Neck: RIJ TDC. Tunnel non-tender.   Lungs: Diminished both bases, left more than right; not labored on RA  Heart: RRR, no s3 or rub  Abdomen: soft, nontender, nondistended, BS +  Extremities: no edema.  Neuro: flat affect. Slow to answer. Moves all 4 ext.     Scheduled Meds:     amLODIPine, 10 mg, Oral, Q24H  aspirin, 81 mg, Oral, Daily  carvedilol, 6.25 mg, Oral, BID With Meals  desvenlafaxine, 25 mg, Oral, Daily  folic acid, 1 mg, Oral, Daily  heparin (porcine), 5,000 Units, Subcutaneous, Q8H  insulin glargine, 3 Units, Subcutaneous, Nightly  insulin lispro, 0-9 Units, Subcutaneous, TID With Meals  levETIRAcetam, 250 mg, Oral, BID  levothyroxine, 300 mcg, Oral, Q AM  lidocaine, 1 patch, Transdermal, Q24H  lisinopril, 20 mg, Oral, Nightly  pantoprazole, 40 mg, Oral, Daily  rOPINIRole, 2 mg, Oral, Nightly      IV Meds:        Results Reviewed:   I have personally reviewed the results from the time of this admission to 2/10/2023 15:36 EST     Results from last 7 days   Lab Units 02/09/23  0542 23  0537 23  0504 23  1352   SODIUM mmol/L 129* 129* 127* 131*   POTASSIUM mmol/L 3.7 3.5 4.5  4.3   CHLORIDE mmol/L 97* 99 95* 97*   CO2 mmol/L 19.8* 22.0 22.0 20.1*   BUN mg/dL 19 14 16 14   CREATININE mg/dL 3.18* 2.74* 2.94* 2.68*   CALCIUM mg/dL 8.2* 8.2* 8.3* 8.3*   BILIRUBIN mg/dL 0.4  --   --  0.5   ALK PHOS U/L 153*  --   --  176*   ALT (SGPT) U/L 7  --   --  <5   AST (SGOT) U/L 13  --   --  13   GLUCOSE mg/dL 108* 96 106* 116*       Estimated Creatinine Clearance: 1.6 mL/min (A) (by C-G formula based on SCr of 3.18 mg/dL (H)).    Results from last 7 days   Lab Units 02/09/23  0542 02/08/23  0537 02/04/23  0626   PHOSPHORUS mg/dL 2.5 1.5* 2.0*             Results from last 7 days   Lab Units 02/10/23  0524 02/08/23  0537 02/07/23  0833 02/06/23  0504 02/05/23  1352   WBC 10*3/mm3 14.00* 11.97* 12.29* 12.64* 15.62*   HEMOGLOBIN g/dL 9.1* 8.7* 8.6* 9.1* 9.8*   PLATELETS 10*3/mm3 269 237 231 245 257             Assessment / Plan     ASSESSMENT:  1. ESRD.  Hyponatremia due to water excess; last HD was yesterday  2. Fever with cough.   3. Generalized weakness and failure to thrive with multiple hospitalizations.   4. Chronic systolic heart failure.   5. Hypothyroid on replacement.  Synthroid dose increased last admission and required IV synthroid.   6. Recent right femur fracture repair.  11/10/22.  7. Left renal hematoma after left ureteral stent 12/30 . Appears larger on CT.  Left renal artery embolization 1/1/23. Cysto, left stent removal 1/10/23. Hg stable.   8. Anemia CKD and blood loss.  Hg stable despite hematoma looking larger on CT  9. DM2  10. History of CVA  11. Left 5th rib fracture .    PLAN:  1.  HD tomorrow  2.  Monitor chemistries    Alejo Lange MD  02/10/23  15:36 EST    [facility]  [phone]

## 2023-02-10 NOTE — PROGRESS NOTES
"Physicians Statement of Medical Necessity for  Ambulance Transportation    GENERAL INFORMATION     Name: Zaina Martinez  YOB: 1965  Medicare #: [id number]  Transport Date: 2/11/2023 (Valid for round trips this date, or for scheduled repetitive trips for 60 days from the date signed below.)  Origin: [facility]  Destination: [facility]  Is the Patient's stay covered under Medicare Part A (PPS/DRG?)Yes  Closest appropriate facility? Yes  If this a hosp-hosp transfer? No  Is this a hospice patient? No    MEDICAL NECESSITY QUESTIONAIRE    Ambulance Transportation is medically necessary only if other means of transportation are contraindicated or would be potentially harmful to the patient.  To meet this requirement, the patient must be either \"bed confined\" or suffer from a condition such that transport by means other than an ambulance is contraindicated by the patient's condition.  The following questions must be answered by the healthcare professional signing below for this form to be valid:     1) Describe the MEDICAL CONDITION (physical and/or mental) of this patient AT THE TIME OF AMBULANCE TRANSPORT that requires the patient to be transported in an ambulance, and why transport by other means is contraindicated by the patient's condition:   Past Medical History:   Diagnosis Date   • Acute CVA (cerebrovascular accident) (HCC) 05/01/2022   • Acute on chronic diastolic CHF (congestive heart failure) (HCC)    • Anemia    • CAD (coronary artery disease) 12/20/2021   • Diabetes (HCC)    • Disease of thyroid gland    • GERD (gastroesophageal reflux disease)    • History of intracranial hemorrhage 5/1/2022   • Hyperlipidemia 11/30/2018   • Hypertension    • Rheumatoid arthritis (HCC)    • Stage 5 chronic kidney disease (HCC)       Past Surgical History:   Procedure Laterality Date   • CHOLECYSTECTOMY WITH INTRAOPERATIVE CHOLANGIOGRAM N/A 1/10/2022    Procedure: Laparoscopic " cholecystectomy with intraoperative cholangiogram;  Surgeon: Ramana Raygoza MD;  Location: [facility] OR;  Service: General;  Laterality: N/A;   • CYSTOSCOPY W/ URETERAL STENT PLACEMENT Left 9/29/2022    Procedure: CYSTOSCOPY URETERAL STENT INSERTION WITH RETROGRADE PYELOGRAM;  Surgeon: Bryant Phan Jr., MD;  Location: [facility] OR;  Service: Urology;  Laterality: Left;   • CYSTOSCOPY W/ URETERAL STENT PLACEMENT Left 1/10/2023    Procedure: CYSTOSCOPY LEFT STENT REMOVAL;  Surgeon: Bryant Phan Jr., MD;  Location: [facility] OR;  Service: Urology;  Laterality: Left;   • EMBOLIZATION MESENTERIC ARTERY N/A 1/1/2023    Procedure: LEFT KIDNEY EMBOLIZATION;  Surgeon: Bijan Ordoñez MD;  [facility];  Service: Interventional Radiology;  Laterality: N/A;   • EYE SURGERY     • FEMUR IM NAILING/RODDING Right 11/10/2022    Procedure: FEMUR INTRAMEDULLARY NAILING/RODDING;  Surgeon: Glen Pierce MD;  Location: [facility] OR;  Service: Orthopedics;  Laterality: Right;   • HIP INTERTROCHANTERIC NAILING Right 11/10/2022    Procedure: HIP INTERTROCHANTERIC NAILING;  Surgeon: Glen Pierce MD;  Location: [facility] OR;  Service: Orthopedics;  Laterality: Right;   • HYSTERECTOMY     • INSERT CENTRAL LINE AT BEDSIDE  12/31/2022        • INSERTION HEMODIALYSIS CATHETER N/A 12/6/2021    Procedure: HEMODIALYSIS CATHETER INSERTION;  Surgeon: Keli Salazar MD;  [facility];  Service: Vascular;  Laterality: N/A;   • INSERTION HEMODIALYSIS CATHETER N/A 5/3/2022    Procedure: TUNNELED CATHETER PLACEMENT;  Surgeon: Keli Salazar MD;  Location: [facility] OR;  Service: Vascular;  Laterality: N/A;   • MUSCLE BIOPSY Left 6/15/2022    Procedure: Left quadriceps muscle biopsy;  Surgeon: Marques Ma MD;  Location: [facility];  Service: Neurosurgery;  Laterality: Left;   • URETEROSCOPY LASER LITHOTRIPSY WITH STENT INSERTION Left  "12/30/2022    Procedure: CYSTOSCOPY WITH LEFT  URETEROSCOPY  LEFT STENT EXCHANGE;  Surgeon: Bryant Phan Jr., MD;  Location: [facility];  Service: Urology;  Laterality: Left;      2) Is this patient \"bed confined\" as defined below?Yes   To be \"bed confined\" the patient must satisfy all three of the following criteria:  (1) unable to get up from bed without assistance; AND (2) unable to ambulate;  AND (3) unable to sit in a chair or wheelchair.  3) Can this patient safely be transported by car or wheelchair van (I.e., may safely sit during transport, without an attendant or monitoring?)No   4. In addition to completing questions 1-3 above, please check any of the following conditions that apply*:          *Note: supporting documentation for any boxes checked must be maintained in the patient's medical records Moderate/severe pain on movement, Medical attendant required, Special handling/isolation/infection control precautions required and Unable to tolerate seated position for time needed to transport. Nonambulatory, falls risk, ESR      SIGNATURE OF PHYSICIAN OR OTHER AUTHORIZED HEALTHCARE PROFESSIONAL    I certify that the above information is true and correct based on my evaluation of this patient, and represent that the patient requires transport by ambulance and that other forms of transport are contraindicated.  I understand that this information will be used by the Centers for Medicare and Medicaid Services (CMS) to support the determiniation of medical necessity for ambulance services, and I represent that I have personal knowledge of the patient's condition at the time of transport.    X   If this box is checked, I also certify that the patient is physically or mentally incapable of signing the ambulance service's claim form and that the institution with which I am affiliated has furnished care, services or assistance to the patient.  My signature below is made on behalf of the patient pursuant to " 42 .36(b)(4). In accordance with 42 .37, the specific reason(s) that the patient is physically or mentally incapable of signing the claim for is as follows:     Signature of Physician or Healthcare Professional  Date/Time:   2/10/2023 @ 1537     (For Scheduled repetitive transport, this form is not valid for transports performed more than 60 days after this date).                                                                                                                                            --------------------------------------------------------------------------------------------  Printed Name and Credentials of Physician or Authorized Healthcare Professional     *Form must be signed by patient's attending physician for scheduled, repetitive transports,.  For non-repetitive ambulance transports, if unable to obtain the signature of the attending physician, any of the following may sign (please select below):     Physician  Clinical Nurse Specialist  Registered Nurse     Physician Assistant X Discharge Planner  Licensed Practical Nurse     Nurse Practitioner X

## 2023-02-10 NOTE — PROGRESS NOTES
Continued Stay Note  [facility]     Patient Name: Zaina Martinez  MRN: [id number]  Today's Date: 2/10/2023    Admit Date: 2/1/2023    Plan: [facility] [place] with HD   Discharge Plan     Row Name 02/10/23 1129       Plan    Plan [facility] [place] with HD    Plan Comments Per Gina with Signature, Auth has been extended, now expires 2/11/2023 at 11:45 PM. Updated MD               Discharge Codes    No documentation.               Expected Discharge Date and Time     Expected Discharge Date Expected Discharge Time    Feb 11, 2023             Liset Beckman RN

## 2023-02-10 NOTE — PLAN OF CARE
Goal Outcome Evaluation:  Plan of Care Reviewed With: patient        Progress: no change  Outcome Evaluation: Pt tolerated PT treatment with c/o fatigue and generalized pain. Pt required Max assist of 2 with bed mobility and needed Philip for sitting balance. Pt required verbal cues to correct posture when pt was leaning all the way back without warning. Pt performed bilateral AAROM LE exercises. Will continue to progress pt as able.

## 2023-02-10 NOTE — PROGRESS NOTES
The patient does not awaken for interview today he is now on Pristiq 25 mg daily and her dose of this medication should not be increased given her renal status.  Continuing to follow.

## 2023-02-10 NOTE — THERAPY TREATMENT NOTE
Patient Name: Zaina Martinez  : 1965    MRN: [id number]                              Today's Date: 2/10/2023       Admit Date: 2023    Visit Dx:     ICD-10-CM ICD-9-CM   1. Pyuria  R82.81 791.9   2. Weakness  R53.1 780.79     Patient Active Problem List   Diagnosis   • Renal insufficiency   • Hypertensive disorder   • Hypothyroidism   • Type 2 diabetes mellitus with kidney complication, with long-term current use of insulin ([facility])   • Rheumatoid arthritis ([facility])   • Angioedema   • Esophageal dysmotility   • Anemia   • Medically noncompliant   • Myocardial infarction due to demand ischemia ([facility])   • Enteritis   • PRES (posterior reversible encephalopathy syndrome)   • Urine retention   • Klebsiella infection   • Superficial thrombophlebitis   • Generalized weakness   • ESRD (end stage renal disease) ([facility])   • CAD (coronary artery disease)   • Abnormal urinalysis   • Chronic diastolic CHF (congestive heart failure) ([facility])   • Pyelonephritis   • Calculus of gallbladder with acute on chronic cholecystitis without obstruction   • Pleural effusion on right   • Anemia due to chronic kidney disease, on chronic dialysis ([facility])   • Abnormal findings on diagnostic imaging of other specified body structures   • Acute upper respiratory infection   • Agitation   • Alkaline phosphatase raised   • Casts present in urine   • Cellulitis of toe   • Hip pain   • Community acquired pneumonia   • Depressive disorder   • Diarrhea of presumed infectious origin   • Difficult or painful urination   • Disease due to severe acute respiratory syndrome coronavirus 2 (SARS-CoV-2)   • Dyspnea   • Encounter for follow-up examination after completed treatment for conditions other than malignant neoplasm   • H/O: hypothyroidism   • Hyperlipidemia   • Hypomagnesemia   • Intractable vomiting with nausea   • Luetscher's syndrome   • Need for influenza vaccination   • Restless legs   • Noncompliance with treatment   • Shoulder pain   • Acute UTI  (urinary tract infection)   • Metabolic encephalopathy   • Abnormal findings on diagnostic imaging of abdomen   • Status post cholecystectomy   • Hyponatremia   • Acute metabolic encephalopathy   • Encephalopathy, toxic   • Acute CVA (cerebrovascular accident) (HCC)   • History of intracranial hemorrhage   • Stroke (HCC)   • Abnormal urinalysis   • Diabetic muscle infarction (HCC)   • Other insomnia   • Altered mental status   • History of stroke- R MCA s/p TPA with subsequent ICH with debility   • Heart murmur   • Bradycardia   • Left lower lobe pneumonia   • Metabolic encephalopathy   • Pericardial effusion   • Pleural effusion   • Acute pulmonary edema (HCC)   • Atrial flutter (HCC)   • Chronic systolic CHF (congestive heart failure) (HCC)   • Thrombus of venous dialysis catheter (HCC)   • Sepsis without acute organ dysfunction (HCC)   • Type 2 diabetes mellitus with hyperglycemia, with long-term current use of insulin (HCC)   • Acute respiratory failure with hypoxia (HCC)   • Acute on chronic systolic CHF (congestive heart failure) (HCC)   • Hyperkalemia   • Acute respiratory failure with hypoxia (HCC)   • Other hypervolemia   • Hematoma of right hip, initial encounter   • Ureteral stent present   • Closed intertrochanteric fracture of right femur (HCC)   • Witnessed seizure-like activity (HCC)   • Acute respiratory failure with hypercapnia (HCC)   • Opioid use   • Diabetic muscle infarction (HCC)   • Acute posthemorrhagic anemia   • Kidney hematoma   • Pyuria   • Severe malnutrition (HCC)     Past Medical History:   Diagnosis Date   • Acute CVA (cerebrovascular accident) (HCC) 05/01/2022   • Acute on chronic diastolic CHF (congestive heart failure) (HCC)    • Anemia    • CAD (coronary artery disease) 12/20/2021   • Diabetes (HCC)    • Disease of thyroid gland    • GERD (gastroesophageal reflux disease)    • History of intracranial hemorrhage 5/1/2022   • Hyperlipidemia 11/30/2018   • Hypertension    •  Rheumatoid arthritis (HCC)    • Stage 5 chronic kidney disease (HCC)      Past Surgical History:   Procedure Laterality Date   • CHOLECYSTECTOMY WITH INTRAOPERATIVE CHOLANGIOGRAM N/A 1/10/2022    Procedure: Laparoscopic cholecystectomy with intraoperative cholangiogram;  Surgeon: Ramana Raygoza MD;  Location: [facility];  Service: General;  Laterality: N/A;   • CYSTOSCOPY W/ URETERAL STENT PLACEMENT Left 9/29/2022    Procedure: CYSTOSCOPY URETERAL STENT INSERTION WITH RETROGRADE PYELOGRAM;  Surgeon: Bryant Phan Jr., MD;  Location: [facility] OR;  Service: Urology;  Laterality: Left;   • CYSTOSCOPY W/ URETERAL STENT PLACEMENT Left 1/10/2023    Procedure: CYSTOSCOPY LEFT STENT REMOVAL;  Surgeon: Bryant Phan Jr., MD;  Location: [facility] OR;  Service: Urology;  Laterality: Left;   • EMBOLIZATION MESENTERIC ARTERY N/A 1/1/2023    Procedure: LEFT KIDNEY EMBOLIZATION;  Surgeon: Bijan Ordoñez MD;  Location: [facility] OR 18/19;  Service: Interventional Radiology;  Laterality: N/A;   • EYE SURGERY     • FEMUR IM NAILING/RODDING Right 11/10/2022    Procedure: FEMUR INTRAMEDULLARY NAILING/RODDING;  Surgeon: Glen Pierce MD;  Location: [facility] OR;  Service: Orthopedics;  Laterality: Right;   • HIP INTERTROCHANTERIC NAILING Right 11/10/2022    Procedure: HIP INTERTROCHANTERIC NAILING;  Surgeon: Glen Pierce MD;  Location: [facility] OR;  Service: Orthopedics;  Laterality: Right;   • HYSTERECTOMY     • INSERT CENTRAL LINE AT BEDSIDE  12/31/2022        • INSERTION HEMODIALYSIS CATHETER N/A 12/6/2021    Procedure: HEMODIALYSIS CATHETER INSERTION;  Surgeon: Keli Salazar MD;  Location: [facility] OR 18/19;  Service: Vascular;  Laterality: N/A;   • INSERTION HEMODIALYSIS CATHETER N/A 5/3/2022    Procedure: TUNNELED CATHETER PLACEMENT;  Surgeon: Keli Salazar MD;  Location: [facility] OR;  Service: Vascular;  Laterality: N/A;   • MUSCLE BIOPSY Left  6/15/2022    Procedure: Left quadriceps muscle biopsy;  Surgeon: Marques Ma MD;  Location: [facility];  Service: Neurosurgery;  Laterality: Left;   • URETEROSCOPY LASER LITHOTRIPSY WITH STENT INSERTION Left 12/30/2022    Procedure: CYSTOSCOPY WITH LEFT  URETEROSCOPY  LEFT STENT EXCHANGE;  Surgeon: Bryant Phan Jr., MD;  Location: [facility];  Service: Urology;  Laterality: Left;      General Information     Row Name 02/10/23 1323          Physical Therapy Time and Intention    Document Type therapy note (daily note)  -     Mode of Treatment physical therapy;individual therapy  -EB     Row Name 02/10/23 1323          General Information    Existing Precautions/Restrictions fall  -EB     Row Name 02/10/23 1323          Cognition    Orientation Status (Cognition) oriented to;person;place  -     Row Name 02/10/23 1323          Safety Issues, Functional Mobility    Safety Issues Affecting Function (Mobility) awareness of need for assistance;insight into deficits/self-awareness;judgment;problem-solving  -     Impairments Affecting Function (Mobility) endurance/activity tolerance;strength;postural/trunk control;range of motion (ROM)  -EB           User Key  (r) = Recorded By, (t) = Taken By, (c) = Cosigned By    Initials Name Provider Type    EB Luann Gutierrez PTA Physical Therapist Assistant               Mobility     Row Name 02/10/23 1323          Bed Mobility    Rolling Left [place] (Bed Mobility) minimum assist (75% patient effort);verbal cues  -EB     Rolling Right [place] (Bed Mobility) minimum assist (75% patient effort);verbal cues  -EB     Supine-Sit [place] (Bed Mobility) maximum assist (25% patient effort);2 person assist;verbal cues  -EB     Sit-Supine [place] (Bed Mobility) maximum assist (25% patient effort);2 person assist;verbal cues  -EB     Assistive Device (Bed Mobility) bed rails;head of bed elevated  -EB     Comment, (Bed Mobility) Pt feeling very fatigued  today. Difficulty sitting on EOB for a period of time  -     Row Name 02/10/23 1323          Sit-Stand Transfer    Comment, (Sit-Stand Transfer) pt declined to attempt despite encouragement.  -           User Key  (r) = Recorded By, (t) = Taken By, (c) = Cosigned By    Initials Name Provider Type     Luann Gutierrez PTA Physical Therapist Assistant               Obj/Interventions     Row Name 02/10/23 1325          Motor Skills    Therapeutic Exercise --  BLE: AP and LAQs (X10) AAROM  -     Row Name 02/10/23 1325          Balance    Balance Assessment sitting static balance  -EB     Static Sitting Balance minimal assist  -EB     Dynamic Sitting Balance minimal assist  -EB     Comment, Balance at one point pt leaned all the way back without warning.  -           User Key  (r) = Recorded By, (t) = Taken By, (c) = Cosigned By    Initials Name Provider Type    Luann Brenner PTA Physical Therapist Assistant               Goals/Plan    No documentation.                Clinical Impression     Row Name 02/10/23 1325          Pain    Pain Location generalized  -[facility] Name 02/10/23 1325          Plan of Care Review    Plan of Care Reviewed With patient  -     Progress no change  -     Outcome Evaluation Pt tolerated PT treatment with c/o fatigue and generalized pain. Pt required Max assist of 2 with bed mobility and needed Philip for sitting balance. Pt required verbal cues to correct posture when pt was leaning all the way back without warning. Pt performed bilateral AAROM LE exercises. Will continue to progress pt as able.  -     Row Name 02/10/23 1325          Therapy Assessment/Plan (PT)    Therapy Frequency (PT) 5 times/wk  -     Row Name 02/10/23 1325          Positioning and Restraints    Pre-Treatment Position in bed  -EB     Post Treatment Position bed  -EB     In Bed supine;call light within reach;encouraged to call for assist;exit alarm on  -EB           User Key  (r) = Recorded By, (t) =  Taken By, (c) = Cosigned By    Initials Name Provider Type    Luann Brenner PTA Physical Therapist Assistant               Outcome Measures     Row Name 02/10/23 1328 02/10/23 0811       How much help from another person do you currently need...    Turning from your back to your side while in flat bed without using bedrails? 2  -EB 2  -JS    Moving from lying on back to sitting on the side of a flat bed without bedrails? 2  -EB 1  -JS    Moving to and from a bed to a chair (including a wheelchair)? 1  -EB 1  -JS    Standing up from a chair using your arms (e.g., wheelchair, bedside chair)? 2  -EB 2  -JS    Climbing 3-5 steps with a railing? 1  -EB 1  -JS    To walk in hospital room? 1  -EB 1  -JS    AM-PAC 6 Clicks Score (PT) 9  -EB 8  -JS    Highest level of mobility 3 --> Sat at edge of bed  -EB 3 --> Sat at edge of bed  -JS          User Key  (r) = Recorded By, (t) = Taken By, (c) = Cosigned By    Initials Name Provider Type    Luann Brenner PTA Physical Therapist Assistant    Dana Bear RN Registered Nurse                             Physical Therapy Education     Title: PT OT SLP Therapies (In Progress)     Topic: Physical Therapy (In Progress)     Point: Mobility training (In Progress)     Learning Progress Summary           Patient Acceptance, E,D, NR by EB at 2/10/2023 1328    Acceptance, E,TB, VU by MT at 2/8/2023 0527    Acceptance, E,TB, VU,NR by CB at 2/6/2023 1409    Acceptance, E,D, DU by PC at 2/2/2023 1409                   Point: Home exercise program (In Progress)     Learning Progress Summary           Patient Acceptance, E,D, NR by EB at 2/10/2023 1328    Acceptance, E,TB, VU by MT at 2/8/2023 0527    Acceptance, E,TB, VU,NR by CB at 2/6/2023 1409    Acceptance, E,D, DU by PC at 2/2/2023 1409                   Point: Body mechanics (In Progress)     Learning Progress Summary           Patient Acceptance, E,D, NR by EB at 2/10/2023 1328    Acceptance, E,TB, VU by MT at 2/8/2023  0527    Acceptance, E,TB, VU,NR by CB at 2/6/2023 1409    Acceptance, E,D, DU by PC at 2/2/2023 1409                   Point: Precautions (Done)     Learning Progress Summary           Patient Acceptance, E,TB, VU by MT at 2/8/2023 0527    Acceptance, E,TB, VU,NR by CB at 2/6/2023 1409    Acceptance, E, VU by MD at 2/3/2023 1130    Acceptance, E,D, DU by  at 2/2/2023 1409                               User Key     Initials Effective Dates Name Provider Type Discipline    PC 06/16/21 -  Cristela Perez PT Physical Therapist PT    MD 06/16/21 -  Jeannette Easton, PT Physical Therapist PT    MT 06/16/21 -  Fernando Perdue, RN Registered Nurse Nurse     06/16/21 -  Luann Gutierrez PTA Physical Therapist Assistant PT     10/22/21 -  Zainab Cobos, PT Physical Therapist PT              PT Recommendation and Plan     Plan of Care Reviewed With: patient  Progress: no change  Outcome Evaluation: Pt tolerated PT treatment with c/o fatigue and generalized pain. Pt required Max assist of 2 with bed mobility and needed Philip for sitting balance. Pt required verbal cues to correct posture when pt was leaning all the way back without warning. Pt performed bilateral AAROM LE exercises. Will continue to progress pt as able.     Time Calculation:    PT Charges     Row Name 02/10/23 1322             Time Calculation    Start Time 0950  -EB      Stop Time 1013  -EB      Time Calculation (min) 23 min  -EB      PT Received On 02/10/23  -EB      PT - Next Appointment 02/13/23  -EB         Time Calculation- PT    Total Timed Code Minutes- PT 23 minute(s)  -EB            User Key  (r) = Recorded By, (t) = Taken By, (c) = Cosigned By    Initials Name Provider Type     Luann Gutierrez PTA Physical Therapist Assistant              Therapy Charges for Today     Code Description Service Date Service Provider Modifiers Qty    79255046636 HC PT THERAPEUTIC ACT EA 15 MIN 2/10/2023 Luann Gutierrez PTA GP 1    74528430791 HC PT THER PROC EA 15 MIN  2/10/2023 Luann Gutierrez PTA GP 1    25696492060 HC PT THER SUPP EA 15 MIN 2/10/2023 Luann Gutierrez PTA GP 1          PT G-Codes  Outcome Measure Options: AM-PAC 6 Clicks Basic Mobility (PT)  AM-PAC 6 Clicks Score (PT): 9  AM-PAC 6 Clicks Score (OT): 11       Luann Gutierrez PTA  2/10/2023

## 2023-02-10 NOTE — NURSING NOTE
"[facility] follow-up d/t anxiety. Chart reviewed. Report received from RN.     Patient sleeping but awakens to name. The patient stated she was not feeling well physically. The patient recently had zoloft decreased and started on pristiq by psychiatrist. The patient voiced she felt this was working well for her. The patient did not rate anxiety and depression but stated \"I think it's doing better.\" The patient denied SI. Did not identify any needs from [facility] at this time. Access will follow.   "

## 2023-02-10 NOTE — PLAN OF CARE
Goal Outcome Evaluation:  Plan of Care Reviewed With: patient        Progress: no change  Outcome Evaluation: Max temp 100.8 tonight, other vss, monitoring blood sugars, ativan given for anxiety, BM x2, encouraged IS use and TCDB, SL, tylenol given for pain in BLE, Zofran given for nausea x1, educated pt on importance of being turned q2h to prevent skin breakdown, all medications given as ordered, encouraged PO intake

## 2023-02-10 NOTE — PROGRESS NOTES
Continued Stay Note  [facility]     Patient Name: Zaina Martinez  MRN: [id number]  Today's Date: 2/10/2023    Admit Date: 2/1/2023    Plan: [facility] with HD   Discharge Plan     Row Name 02/10/23 1528       Plan    Plan [facility] with HD    Plan Comments Per MD, will DC tomorrow. Transportation scheduled with [facility] for 5pm tomorrow (2/11). Informed Gina with [facility] and RN.               Discharge Codes    No documentation.               Expected Discharge Date and Time     Expected Discharge Date Expected Discharge Time    Feb 11, 2023             Liset Beckman, RN

## 2023-02-10 NOTE — PROGRESS NOTES
Name: Zaina Martinez ADMIT: 2023   : 1965  PCP: Provider, No Known    MRN: [id number] LOS: 1 days   AGE/SEX: 57 y.o. female  ROOM: [facility]     Subjective   Subjective   No acute events. Patient denies new complaints. Taking PO. No CP/dyspnea/c/n/v/d.  +fever overnight  Objective   Objective   Vital Signs  Temp:  [98.2 °F (36.8 °C)-100.8 °F (38.2 °C)] 98.3 °F (36.8 °C)  Heart Rate:  [74-92] 77  Resp:  [12-18] 16  BP: (146-169)/(70-82) 148/80  SpO2:  [95 %-100 %] 95 %  on   ;   Device (Oxygen Therapy): room air  Body mass index is 2.1 kg/m².  Physical Exam  Vitals and nursing note reviewed.   Constitutional:       General: She is not in acute distress.     Appearance: She is ill-appearing (chronically). She is not toxic-appearing or diaphoretic.   HENT:      Head: Normocephalic and atraumatic.      Nose: Nose normal.      Mouth/Throat:      Mouth: Mucous membranes are moist.      Pharynx: Oropharynx is clear.   Eyes:      Conjunctiva/sclera: Conjunctivae normal.      Pupils: Pupils are equal, round, and reactive to light.   Cardiovascular:      Rate and Rhythm: Normal rate and regular rhythm.      Pulses: Normal pulses.   Pulmonary:      Effort: Pulmonary effort is normal.   Abdominal:      General: Bowel sounds are normal.      Palpations: Abdomen is soft.   Musculoskeletal:         General: No swelling or tenderness.      Cervical back: Neck supple.   Skin:     General: Skin is warm and dry.      Capillary Refill: Capillary refill takes less than 2 seconds.   Neurological:      Mental Status: She is alert.   Psychiatric:         Mood and Affect: Mood normal.         Behavior: Behavior normal.       Results Review     I reviewed the patient's new clinical results.  Results from last 7 days   Lab Units 02/10/23  0524 23  0537 23  0833 23  0504   WBC 10*3/mm3 14.00* 11.97* 12.29* 12.64*   HEMOGLOBIN g/dL 9.1* 8.7* 8.6* 9.1*   PLATELETS 10*3/mm3 269 237 231 245     Results from last 7 days    Lab Units 02/09/23  0542 02/08/23  0537 02/06/23  0504 02/05/23  1352   SODIUM mmol/L 129* 129* 127* 131*   POTASSIUM mmol/L 3.7 3.5 4.5 4.3   CHLORIDE mmol/L 97* 99 95* 97*   CO2 mmol/L 19.8* 22.0 22.0 20.1*   BUN mg/dL 19 14 16 14   CREATININE mg/dL 3.18* 2.74* 2.94* 2.68*   GLUCOSE mg/dL 108* 96 106* 116*   EGFR mL/min/1.73 16.4* 19.6* 18.1* 20.2*     Results from last 7 days   Lab Units 02/09/23  0542 02/08/23  0537 02/05/23  1352 02/04/23  0626   ALBUMIN g/dL 2.6* 2.5* 2.7* 3.0*   BILIRUBIN mg/dL 0.4  --  0.5  --    ALK PHOS U/L 153*  --  176*  --    AST (SGOT) U/L 13  --  13  --    ALT (SGPT) U/L 7  --  <5  --      Results from last 7 days   Lab Units 02/09/23  0542 02/08/23  0537 02/06/23  0504 02/05/23  1352 02/04/23  0626   CALCIUM mg/dL 8.2* 8.2* 8.3* 8.3* 8.2*   ALBUMIN g/dL 2.6* 2.5*  --  2.7* 3.0*   PHOSPHORUS mg/dL 2.5 1.5*  --   --  2.0*     Results from last 7 days   Lab Units 02/05/23  1352   PROCALCITONIN ng/mL 0.68*     Glucose   Date/Time Value Ref Range Status   02/10/2023 1053 119 70 - 130 mg/dL Final     Comment:     Meter: [id number] : [id number] Jacki SAAVEDRA   02/10/2023 0633 74 70 - 130 mg/dL Final     Comment:     Meter: [id number] : [id number] Aureliano Nunn RN   02/10/2023 0606 53 (L) 70 - 130 mg/dL Final     Comment:     Meter: [id number] : [id number] Lobosnog Remenia NA   02/09/2023 2113 128 70 - 130 mg/dL Final     Comment:     Meter: [id number] : [id number] Aureliano Nunn RN   02/09/2023 1641 96 70 - 130 mg/dL Final     Comment:     Meter: [id number] : [id number] Daniel JUNG RN   02/09/2023 1204 81 70 - 130 mg/dL Final     Comment:     Meter: [id number] : [id number] Daniel JUNG RN   02/09/2023 0613 113 70 - 130 mg/dL Final     Comment:     Meter: [id number] : [id number] Juan Becerril CNA       CT Abdomen Pelvis Without Contrast    Result Date: 2/9/2023   1. Left basilar consolidation. Some of this may be atelectasis. Pneumonia is not  excluded. 2. Previously described left perinephric hematoma appears larger than on the study. 3. Thick-walled appearance to urinary bladder. 4. Potentially thick-walled appearance to the rectum. Correlation with any symptoms of proctitis is suggested.  Radiation dose reduction techniques were utilized, including automated exposure control and exposure modulation based on body size.  This report was finalized on 2/9/2023 12:38 AM by Dr. Cherri Rodriguez M.D.      CT Chest Without Contrast Diagnostic    Result Date: 2/9/2023   1. Left basilar consolidation. Some of this may be atelectasis. Pneumonia is not excluded. 2. Previously described left perinephric hematoma appears larger than on the study. 3. Thick-walled appearance to urinary bladder. 4. Potentially thick-walled appearance to the rectum. Correlation with any symptoms of proctitis is suggested.  Radiation dose reduction techniques were utilized, including automated exposure control and exposure modulation based on body size.  This report was finalized on 2/9/2023 12:38 AM by Dr. Cherri Rodriguez M.D.      I have personally reviewed all medications:  Scheduled Medications  amLODIPine, 10 mg, Oral, Q24H  aspirin, 81 mg, Oral, Daily  carvedilol, 6.25 mg, Oral, BID With Meals  desvenlafaxine, 25 mg, Oral, Daily  folic acid, 1 mg, Oral, Daily  heparin (porcine), 5,000 Units, Subcutaneous, Q8H  insulin glargine, 3 Units, Subcutaneous, Nightly  insulin lispro, 0-9 Units, Subcutaneous, TID With Meals  levETIRAcetam, 250 mg, Oral, BID  levothyroxine, 300 mcg, Oral, Q AM  lidocaine, 1 patch, Transdermal, Q24H  lisinopril, 20 mg, Oral, Nightly  pantoprazole, 40 mg, Oral, Daily  rOPINIRole, 2 mg, Oral, Nightly    Infusions   Diet  Diet: Regular/House Diet; Texture: Regular Texture (IDDSI 7); Fluid Consistency: Thin (IDDSI 0)    I have personally reviewed:  [x]  Laboratory   [x]  Microbiology   [x]  Radiology   [x]  EKG/Telemetry  [x]  Cardiology/Vascular   []   Pathology    []  Records       Assessment/Plan     Active Hospital Problems    Diagnosis  POA   • **Generalized weakness [R53.1]  Yes   • Severe malnutrition ([facility]) [E43]  Yes   • Pyuria [R82.81]  Yes   • Chronic systolic CHF (congestive heart failure) ([facility]) [I50.22]  Yes   • History of stroke- R MCA s/p TPA with subsequent ICH with debility [Z86.73]  Not Applicable   • ESRD (end stage renal disease) ([facility]) [N18.6]  Yes   • Hypothyroidism [E03.9]  Yes   • Hypertensive disorder [I10]  Yes   • Type 2 diabetes mellitus with kidney complication, with long-term current use of insulin ([facility]) [E11.29, Z79.4]  Not Applicable      Resolved Hospital Problems   No resolved problems to display.   Generalized Weakness  - presenting complaint, has been ongoing-patient and  did not want rehab last admission and she was therefore discharged home- cannot care for her at home and she is now agreeable to SNF    Intermittent Fevers  - completed abx for LLL Pna, has otherwise had extensive infectious workup which has not revealed a source  - temperature curve did not significantly change with abx, thought to be non-infectious cause such as her rheumatoid arthritis or known pelvic hematoma  - appreciate ID recs  - will monitor temperature curve for another day, now off of antibiotics      Seizure Disorder  - continue Keppra      Renal hematoma/hemorrhage after ureteral stent exchange 12/30/2023  - Status post transfusion, Kcentra, left renal artery embolization 1/1/2023  - Status post cystoscopy and stent removal 1/10/2023  - Hemoglobin stable      Right thigh diabetic muscle infarct  - continue ASA     ESRD on dialysis, anemia, hypertension  - continue HD per nephrology, appreciate recs     Type 2 DM  - BG acceptable  - continue ssi/hypoglycemia protocol     Paroxysmal atrial fibrillation  - Anticoagulation stopped due to life-threatening bleeding  - she is no longer an anticoagulation candidate     Hypothyroidism  - did have  trouble correcting TSH despite supplementation during her last hospitalization  - free t4 normal     Depression  - on pristiq per psychiatry, appreciate recs    Heparin SC for DVT prophylaxis.  Full code.  Discussed with patient, nursing staff and CCP.  Anticipate discharge to SNU facility tomorrow.      Mta Marte MD  [facility]  02/10/23  14:39 EST

## 2023-02-10 NOTE — PLAN OF CARE
Goal Outcome Evaluation:  Plan of Care Reviewed With: patient        Progress: no change  Outcome Evaluation: Patient will have Dialysis 2/11, orders called. Transportation to [facility] arranged for 1700 tomorrow following dialysis. Patient remains in contact isolation for MRSA, bed alarm in use. WBC 14. Afebrile currently, last fever yesterday evening.

## 2023-02-11 VITALS
TEMPERATURE: 98.6 F | BODY MASS INDEX: 2.11 KG/M2 | OXYGEN SATURATION: 98 % | HEIGHT: 62 IN | WEIGHT: 11.46 LBS | SYSTOLIC BLOOD PRESSURE: 163 MMHG | HEART RATE: 81 BPM | RESPIRATION RATE: 16 BRPM | DIASTOLIC BLOOD PRESSURE: 77 MMHG

## 2023-02-11 LAB
ALBUMIN SERPL-MCNC: 2.5 G/DL (ref 3.5–5.2)
ANION GAP SERPL CALCULATED.3IONS-SCNC: 6.4 MMOL/L (ref 5–15)
BACTERIA SPEC AEROBE CULT: ABNORMAL
BUN SERPL-MCNC: 17 MG/DL (ref 6–20)
BUN/CREAT SERPL: 6 (ref 7–25)
CALCIUM SPEC-SCNC: 8 MG/DL (ref 8.6–10.5)
CHLORIDE SERPL-SCNC: 92 MMOL/L (ref 98–107)
CO2 SERPL-SCNC: 25.6 MMOL/L (ref 22–29)
CREAT SERPL-MCNC: 2.84 MG/DL (ref 0.57–1)
EGFRCR SERPLBLD CKD-EPI 2021: 18.8 ML/MIN/1.73
GLUCOSE BLDC GLUCOMTR-MCNC: 132 MG/DL (ref 70–130)
GLUCOSE BLDC GLUCOMTR-MCNC: 91 MG/DL (ref 70–130)
GLUCOSE SERPL-MCNC: 133 MG/DL (ref 65–99)
MAGNESIUM SERPL-MCNC: 1.6 MG/DL (ref 1.6–2.6)
PHOSPHATE SERPL-MCNC: 1.5 MG/DL (ref 2.5–4.5)
POTASSIUM SERPL-SCNC: 3.8 MMOL/L (ref 3.5–5.2)
SODIUM SERPL-SCNC: 124 MMOL/L (ref 136–145)

## 2023-02-11 PROCEDURE — 83735 ASSAY OF MAGNESIUM: CPT | Performed by: INTERNAL MEDICINE

## 2023-02-11 PROCEDURE — 25010000002 HEPARIN (PORCINE) PER 1000 UNITS: Performed by: STUDENT IN AN ORGANIZED HEALTH CARE EDUCATION/TRAINING PROGRAM

## 2023-02-11 PROCEDURE — 80069 RENAL FUNCTION PANEL: CPT | Performed by: INTERNAL MEDICINE

## 2023-02-11 PROCEDURE — G0378 HOSPITAL OBSERVATION PER HR: HCPCS

## 2023-02-11 PROCEDURE — 25010000002 HEPARIN (PORCINE) PER 1000 UNITS: Performed by: HOSPITALIST

## 2023-02-11 PROCEDURE — 82962 GLUCOSE BLOOD TEST: CPT

## 2023-02-11 PROCEDURE — 96372 THER/PROPH/DIAG INJ SC/IM: CPT

## 2023-02-11 PROCEDURE — G0257 UNSCHED DIALYSIS ESRD PT HOS: HCPCS

## 2023-02-11 RX ORDER — LISINOPRIL 20 MG/1
20 TABLET ORAL NIGHTLY
Status: ON HOLD
Start: 2023-02-11 | End: 2023-03-21

## 2023-02-11 RX ORDER — AMOXICILLIN 250 MG
2 CAPSULE ORAL NIGHTLY PRN
Start: 2023-02-11

## 2023-02-11 RX ORDER — ACETAMINOPHEN 325 MG/1
650 TABLET ORAL EVERY 6 HOURS PRN
Start: 2023-02-11

## 2023-02-11 RX ORDER — ROPINIROLE 2 MG/1
2 TABLET, FILM COATED ORAL NIGHTLY
Qty: 30 TABLET | Refills: 0 | Status: SHIPPED | OUTPATIENT
Start: 2023-02-11

## 2023-02-11 RX ORDER — LORAZEPAM 0.5 MG/1
0.5 TABLET ORAL EVERY 6 HOURS PRN
Qty: 12 TABLET | Refills: 0 | Status: SHIPPED | OUTPATIENT
Start: 2023-02-11 | End: 2023-03-11 | Stop reason: HOSPADM

## 2023-02-11 RX ORDER — LIDOCAINE 50 MG/G
1 PATCH TOPICAL
Status: ON HOLD
Start: 2023-02-12 | End: 2023-03-21

## 2023-02-11 RX ORDER — DESVENLAFAXINE 25 MG/1
25 TABLET, EXTENDED RELEASE ORAL DAILY
Qty: 30 TABLET | Refills: 0 | Status: SHIPPED | OUTPATIENT
Start: 2023-02-11 | End: 2023-03-29 | Stop reason: SDUPTHER

## 2023-02-11 RX ORDER — LANOLIN ALCOHOL/MO/W.PET/CERES
3 CREAM (GRAM) TOPICAL NIGHTLY PRN
Start: 2023-02-11

## 2023-02-11 RX ADMIN — ASPIRIN 81 MG: 81 TABLET, COATED ORAL at 12:51

## 2023-02-11 RX ADMIN — LEVOTHYROXINE SODIUM 300 MCG: 0.15 TABLET ORAL at 06:23

## 2023-02-11 RX ADMIN — LEVETIRACETAM 250 MG: 250 TABLET, FILM COATED ORAL at 12:51

## 2023-02-11 RX ADMIN — CARVEDILOL 6.25 MG: 6.25 TABLET, FILM COATED ORAL at 12:52

## 2023-02-11 RX ADMIN — AMLODIPINE BESYLATE 10 MG: 10 TABLET ORAL at 12:51

## 2023-02-11 RX ADMIN — DESVENLAFAXINE SUCCINATE 25 MG: 25 TABLET, EXTENDED RELEASE ORAL at 12:52

## 2023-02-11 RX ADMIN — HEPARIN SODIUM 5000 UNITS: 5000 INJECTION INTRAVENOUS; SUBCUTANEOUS at 12:51

## 2023-02-11 RX ADMIN — FOLIC ACID 1 MG: 1 TABLET ORAL at 12:52

## 2023-02-11 RX ADMIN — HEPARIN SODIUM 3800 UNITS: 1000 INJECTION INTRAVENOUS; SUBCUTANEOUS at 11:42

## 2023-02-11 RX ADMIN — PANTOPRAZOLE SODIUM 40 MG: 40 TABLET, DELAYED RELEASE ORAL at 12:52

## 2023-02-11 RX ADMIN — LORAZEPAM 0.5 MG: 0.5 TABLET ORAL at 12:59

## 2023-02-11 RX ADMIN — ACETAMINOPHEN 650 MG: 325 TABLET, FILM COATED ORAL at 00:58

## 2023-02-11 RX ADMIN — HEPARIN SODIUM 5000 UNITS: 5000 INJECTION INTRAVENOUS; SUBCUTANEOUS at 00:55

## 2023-02-11 RX ADMIN — LOPERAMIDE HCL 2 MG: 1 SOLUTION ORAL at 00:55

## 2023-02-11 NOTE — PLAN OF CARE
Goal Outcome Evaluation:  Plan of Care Reviewed With: patient        Progress: no change  Outcome Evaluation: HD completed this am without incident.  pt return to unit.  voices no c/o.  incontinent of small BM.  skin care provided.  turns self in bed.  declined OOB activity.  bed alarm in use for safety. upper SR padded on bed. offered lunch and supplemental drink and pt declined; only wanted water to drink. contact isolation observed.  glucose stable.  phos level 1.5 this am.  dr fontana notified.  subclavian HD cath dressing current and CDI.  anticipate DC to [facility] this afternoon.  report called to facility.  spouse called and notified.  awaiting transport from  EMS.

## 2023-02-11 NOTE — DISCHARGE SUMMARY
Date of Admission: 2/1/2023  Date of Discharge:  2/11/2023  Primary Care Physician: Provider, No Known     Discharge Diagnosis:  Active Hospital Problems    Diagnosis  POA   • **Generalized weakness [R53.1]  Yes   • Severe malnutrition ([facility]) [E43]  Yes   • Pyuria [R82.81]  Yes   • Chronic systolic CHF (congestive heart failure) ([facility]) [I50.22]  Yes   • History of stroke- R MCA s/p TPA with subsequent ICH with debility [Z86.73]  Not Applicable   • ESRD (end stage renal disease) ([facility]) [N18.6]  Yes   • Hypothyroidism [E03.9]  Yes   • Hypertensive disorder [I10]  Yes   • Type 2 diabetes mellitus with kidney complication, with long-term current use of insulin ([facility]) [E11.29, Z79.4]  Not Applicable      Resolved Hospital Problems   No resolved problems to display.       Presenting Problem/History of Present Illness:  Weakness [R53.1]  Pyuria [R82.81]     Hospital Course:  The patient is a 57 y.o. female with a multitude of medical problems including PAF, Type 2 DM, hypothyroidism, rheumatoid arthritis, recent right femur fracture, seizure disorder, and ESRD on hemodialysis with recent lengthy complicated hospital admission for respiratory failure from increased use of her pain medications and volume overload as well as life threatening left renal hematoma following left ureteral stent exchange that ultimately required anticoagulation cessation and left renal artery embolization on 1/1/23 who presented with generalized weakness. Please see admission H&P for further details. She was discharged home with her  3 days prior following the aforementioned lengthy hospitalization as she and her family adamantly declined skilled nursing facility placement as was recommended. Three days following her discharge she was brought back to the hospital as her  was unable to care for her at home. She was found to have a leukocytosis which had actually improved from her previous hospitalization. She was evaluated by infectious  "diseases and had a negative infectious workup. Later in the admission she began having fevers. She was re-evaluated by infectious diseases and after a thorough evaluation did not have evidence of infection or DVT. She had been started on empiric antibiotics and received 5 days of these with no significant influence on her temperatures. Her antibiotics were stopped and she remained stable, in fact she has had no fevers in greater than 24 hours. The cause is thought to be non-infectious and possibly due to her rheumatoid arthritis (she is off of her chronic steroids), pulmonary atelectasis, or her known hematoma. Her urine culture was positive but she has consistently had no urinary symptoms and her specimen was highly contaminated. This is likely a contamination vs a colonization but in either case I do not think that antibiotics are indicated given the above clinical course. Fevers should be treated with PRN tylenol with further workup as needed if a significant clinical change is apparent.    Exam Today:  Blood pressure 156/79, pulse 72, temperature 98.6 °F (37 °C), temperature source Temporal, resp. rate 16, height 157.5 cm (62\"), weight 5.2 kg (11 lb 7.4 oz), SpO2 98 %, not currently breastfeeding.  Vitals and nursing note reviewed.   Constitutional:       General: She is not in acute distress.     Appearance: She appears chronically ill. She is not toxic-appearing or diaphoretic.   HENT:      Head: Normocephalic and atraumatic.      Nose: Nose normal.      Mouth/Throat:      Mouth: Mucous membranes are moist.      Pharynx: Oropharynx is clear.   Eyes:      Conjunctiva/sclera: Conjunctivae normal.      Pupils: Pupils are equal, round, and reactive to light.   Cardiovascular:      Rate and Rhythm: Normal rate and regular rhythm.      Pulses: Normal pulses.   Pulmonary:      Effort: Pulmonary effort is normal.   Abdominal:      General: Bowel sounds are normal.      Palpations: Abdomen is soft.   Musculoskeletal:   "       General: No swelling or tenderness.      Cervical back: Neck supple.   Skin:     General: Skin is warm and dry.      Capillary Refill: Capillary refill takes less than 2 seconds.   Neurological:      Mental Status: She is alert.   Psychiatric:         Mood and Affect: Mood normal.         Behavior: Behavior normal.       Consults:   Consults     Date and Time Order Name Status Description    2/7/2023 12:14 PM Inpatient Infectious Diseases Consult      2/4/2023  4:56 PM Inpatient Psychiatrist Consult Completed     2/1/2023  8:10 PM Inpatient Infectious Diseases Consult Completed     2/1/2023  7:33 PM Inpatient Nephrology Consult Completed     2/1/2023  3:22 PM [facility] (on-call MD unless specified) Details      1/22/2023  2:49 PM Inpatient Cardiology Consult Completed     1/13/2023  6:55 AM Inpatient Neurology Consult General Completed     12/31/2022  8:51 AM Hematology & Oncology Inpatient Consult Completed     12/31/2022  2:09 AM Inpatient Pulmonology Consult Completed     12/28/2022  4:33 PM Inpatient Orthopedic Surgery Consult Completed     12/27/2022  1:20 PM Inpatient Urology Consult Completed     12/26/2022  6:39 PM Inpatient Nephrology Consult Completed     12/26/2022  6:36 PM Nephrology (on -call MD unless specified) Completed     12/26/2022  6:20 PM Nephrology (on -call MD unless specified) Completed            Discharge Disposition:  Skilled Nursing Facility (DC - External)    Discharge Medications:     Discharge Medications      New Medications      Instructions Start Date   acetaminophen 325 MG tablet  Commonly known as: TYLENOL   650 mg, Oral, Every 6 Hours PRN      Desvenlafaxine Succinate ER 25 MG tablet sustained-release 24 hour   25 mg, Oral, Daily      lidocaine 5 %  Commonly known as: LIDODERM   1 patch, Transdermal, Every 24 Hours Scheduled, Remove & Discard patch within 12 hours or as directed by MD   Start Date: February 12, 2023     lisinopril 20 MG tablet  Commonly known as:  PRINIVIL,ZESTRIL   20 mg, Oral, Nightly      LORazepam 0.5 MG tablet  Commonly known as: ATIVAN   0.5 mg, Oral, Every 6 Hours PRN      rOPINIRole 2 MG tablet  Commonly known as: REQUIP   2 mg, Oral, Nightly         Changes to Medications      Instructions Start Date   melatonin 3 MG tablet  What changed:   · when to take this  · reasons to take this   3 mg, Oral, Nightly PRN      sennosides-docusate 8.6-50 MG per tablet  Commonly known as: PERICOLACE  What changed:   · when to take this  · reasons to take this   2 tablets, Oral, Nightly PRN         Continue These Medications      Instructions Start Date   amLODIPine 10 MG tablet  Commonly known as: NORVASC   10 mg, Oral, Every 24 Hours Scheduled      Aspirin Low Dose 81 MG EC tablet  Generic drug: aspirin   81 mg, Oral, Daily      carvedilol 3.125 MG tablet  Commonly known as: COREG   3.125 mg, Oral, 2 Times Daily With Meals      folic acid 1 MG tablet  Commonly known as: FOLVITE   1 mg, Oral, Daily      insulin glargine 100 UNIT/ML injection  Commonly known as: LANTUS, SEMGLEE   3 Units, Subcutaneous, Nightly      insulin lispro 100 UNIT/ML injection  Commonly known as: ADMELOG   0-7 Units, Subcutaneous, 3 Times Daily Before Meals      lactulose 10 GM/15ML solution  Commonly known as: CHRONULAC   10 g, Oral, 2 Times Daily      levETIRAcetam 250 MG tablet  Commonly known as: Keppra   250 mg, Oral, 2 Times Daily      levothyroxine 150 MCG tablet  Commonly known as: Synthroid   300 mcg, Oral, Daily      pantoprazole 40 MG EC tablet  Commonly known as: PROTONIX   40 mg, Oral, Daily         Stop These Medications    sertraline 50 MG tablet  Commonly known as: ZOLOFT            Discharge Diet:   Diet Instructions     Diet: Regular; Thin      Discharge Diet: Regular    Fluid Consistency: Thin          Activity at Discharge:   Activity Instructions     Activity as Tolerated            Follow-up Appointments:  Future Appointments   Date Time Provider Department Center    2/11/2023  5:00 PM EMS MED 2 [facility]   3/9/2023 10:15 AM Mc Lofton MD [facility]     Additional Instructions for the Follow-ups that You Need to Schedule     Discharge Follow-up with Specialty: General Practitioner or PCP at [facility]   As directed      Specialty: General Practitioner or PCP at [facility]    Follow Up Details: 1-3d               Test Results Pending at Discharge:  Pending Labs     Order Current Status    Blood Culture - Blood, Dialysis Line-Fistula Preliminary result    Blood Culture - Blood, Dialysis Line-Fistula Preliminary result    Urine Culture - Urine, Straight Cath Preliminary result           Mat Marte MD  02/11/23  11:32 EST    Time Spent on Discharge Activities: Greater than 30 minutes.

## 2023-02-11 NOTE — PLAN OF CARE
Goal Outcome Evaluation:  Plan of Care Reviewed With: patient        Progress: no change  Outcome Evaluation: vss, c/o pain medicated with tylenol, up with assist, for HD today then d/c to [facility], bed is available, transfort by [facility], [facility] order already called yesterday and aware that pt will be d/c at 1700. continue to monitor the pt.

## 2023-02-11 NOTE — NURSING NOTE
AC follow up: chart reviewed. Pt seen for anxiety. Psychiatrist and AC following. Plan for pt to d/c today to SNF. No changes overnight. Will follow through d/c.

## 2023-02-11 NOTE — NURSING NOTE
hd without incident or c/o. tolerated well. removed 2.4 l. no meds administered. drsg current, cdi. stable, no c/o upon completion of hd.

## 2023-02-12 LAB
BACTERIA SPEC AEROBE CULT: NORMAL
BACTERIA SPEC AEROBE CULT: NORMAL

## 2023-02-13 NOTE — PROGRESS NOTES
Case Management Discharge Note      Final Note: Discharged to [place] with HD. Liset Beckman, RN    Provided Post Acute Provider List?: Yes  Delivered To: Support Person  Support Person: Marv Martinez- spouse  Method of Delivery: In person    Selected Continued Care - Discharged on 2/11/2023 Admission date: 2/1/2023 - Discharge disposition: Skilled Nursing Facility (DC - External)    Destination Coordination complete.    Service Provider Selected Services Address Phone Fax Patient Preferred    [facility] [place] [facility] [address] [phone] --                 Transportation Services  Ambulance: [facility]    Final Discharge Disposition Code: 03 - skilled nursing facility (SNF)

## 2023-03-04 ENCOUNTER — APPOINTMENT (OUTPATIENT)
Dept: GENERAL RADIOLOGY | Facility: HOSPITAL | Age: 58
End: 2023-03-04
Payer: MEDICARE

## 2023-03-04 ENCOUNTER — HOSPITAL ENCOUNTER (OUTPATIENT)
Facility: HOSPITAL | Age: 58
Setting detail: OBSERVATION
Discharge: NURSING, REHAB OR LONG-TERM CARE | End: 2023-03-11
Attending: EMERGENCY MEDICINE | Admitting: STUDENT IN AN ORGANIZED HEALTH CARE EDUCATION/TRAINING PROGRAM
Payer: MEDICARE

## 2023-03-04 DIAGNOSIS — G89.29 OTHER CHRONIC PAIN: ICD-10-CM

## 2023-03-04 DIAGNOSIS — E11.649 HYPOGLYCEMIA DUE TO TYPE 2 DIABETES MELLITUS: Primary | ICD-10-CM

## 2023-03-04 DIAGNOSIS — D72.829 LEUKOCYTOSIS, UNSPECIFIED TYPE: ICD-10-CM

## 2023-03-04 DIAGNOSIS — M06.9 RHEUMATOID ARTHRITIS, INVOLVING UNSPECIFIED SITE, UNSPECIFIED WHETHER RHEUMATOID FACTOR PRESENT: ICD-10-CM

## 2023-03-04 DIAGNOSIS — F41.1 GAD (GENERALIZED ANXIETY DISORDER): ICD-10-CM

## 2023-03-04 DIAGNOSIS — N18.6 ESRD (END STAGE RENAL DISEASE): ICD-10-CM

## 2023-03-04 LAB
ALBUMIN SERPL-MCNC: 2.7 G/DL (ref 3.5–5.2)
ALBUMIN/GLOB SERPL: 0.8 G/DL
ALP SERPL-CCNC: 161 U/L (ref 39–117)
ALT SERPL W P-5'-P-CCNC: 5 U/L (ref 1–33)
ANION GAP SERPL CALCULATED.3IONS-SCNC: 7.8 MMOL/L (ref 5–15)
AST SERPL-CCNC: 25 U/L (ref 1–32)
BACTERIA UR QL AUTO: ABNORMAL /HPF
BASOPHILS # BLD AUTO: 0.03 10*3/MM3 (ref 0–0.2)
BASOPHILS NFR BLD AUTO: 0.1 % (ref 0–1.5)
BILIRUB SERPL-MCNC: 0.5 MG/DL (ref 0–1.2)
BILIRUB UR QL STRIP: NEGATIVE
BUN SERPL-MCNC: 19 MG/DL (ref 6–20)
BUN/CREAT SERPL: 8 (ref 7–25)
CALCIUM SPEC-SCNC: 8.2 MG/DL (ref 8.6–10.5)
CHLORIDE SERPL-SCNC: 91 MMOL/L (ref 98–107)
CLARITY UR: ABNORMAL
CO2 SERPL-SCNC: 30.2 MMOL/L (ref 22–29)
COLOR UR: ABNORMAL
CREAT SERPL-MCNC: 2.38 MG/DL (ref 0.57–1)
DEPRECATED RDW RBC AUTO: 48.5 FL (ref 37–54)
EGFRCR SERPLBLD CKD-EPI 2021: 23.3 ML/MIN/1.73
EOSINOPHIL # BLD AUTO: 0.03 10*3/MM3 (ref 0–0.4)
EOSINOPHIL NFR BLD AUTO: 0.1 % (ref 0.3–6.2)
ERYTHROCYTE [DISTWIDTH] IN BLOOD BY AUTOMATED COUNT: 16.9 % (ref 12.3–15.4)
GLOBULIN UR ELPH-MCNC: 3.4 GM/DL
GLUCOSE BLDC GLUCOMTR-MCNC: 100 MG/DL (ref 70–130)
GLUCOSE BLDC GLUCOMTR-MCNC: 112 MG/DL (ref 70–130)
GLUCOSE BLDC GLUCOMTR-MCNC: 134 MG/DL (ref 70–130)
GLUCOSE BLDC GLUCOMTR-MCNC: 151 MG/DL (ref 70–130)
GLUCOSE BLDC GLUCOMTR-MCNC: 92 MG/DL (ref 70–130)
GLUCOSE SERPL-MCNC: 143 MG/DL (ref 65–99)
GLUCOSE UR STRIP-MCNC: NEGATIVE MG/DL
HCT VFR BLD AUTO: 31.8 % (ref 34–46.6)
HGB BLD-MCNC: 9.9 G/DL (ref 12–15.9)
HGB UR QL STRIP.AUTO: ABNORMAL
HYALINE CASTS UR QL AUTO: ABNORMAL /LPF
IMM GRANULOCYTES # BLD AUTO: 0.15 10*3/MM3 (ref 0–0.05)
IMM GRANULOCYTES NFR BLD AUTO: 0.7 % (ref 0–0.5)
KETONES UR QL STRIP: NEGATIVE
LEUKOCYTE ESTERASE UR QL STRIP.AUTO: ABNORMAL
LYMPHOCYTES # BLD AUTO: 1.11 10*3/MM3 (ref 0.7–3.1)
LYMPHOCYTES NFR BLD AUTO: 5.2 % (ref 19.6–45.3)
MCH RBC QN AUTO: 25.2 PG (ref 26.6–33)
MCHC RBC AUTO-ENTMCNC: 31.1 G/DL (ref 31.5–35.7)
MCV RBC AUTO: 80.9 FL (ref 79–97)
MONOCYTES # BLD AUTO: 0.91 10*3/MM3 (ref 0.1–0.9)
MONOCYTES NFR BLD AUTO: 4.2 % (ref 5–12)
NEUTROPHILS NFR BLD AUTO: 19.32 10*3/MM3 (ref 1.7–7)
NEUTROPHILS NFR BLD AUTO: 89.7 % (ref 42.7–76)
NITRITE UR QL STRIP: NEGATIVE
NRBC BLD AUTO-RTO: 0 /100 WBC (ref 0–0.2)
PH UR STRIP.AUTO: 8 [PH] (ref 5–8)
PLATELET # BLD AUTO: 293 10*3/MM3 (ref 140–450)
PMV BLD AUTO: 9 FL (ref 6–12)
POTASSIUM SERPL-SCNC: 4.2 MMOL/L (ref 3.5–5.2)
PROCALCITONIN SERPL-MCNC: 1.76 NG/ML (ref 0–0.25)
PROT SERPL-MCNC: 6.1 G/DL (ref 6–8.5)
PROT UR QL STRIP: ABNORMAL
RBC # BLD AUTO: 3.93 10*6/MM3 (ref 3.77–5.28)
RBC # UR STRIP: ABNORMAL /HPF
REF LAB TEST METHOD: ABNORMAL
SODIUM SERPL-SCNC: 129 MMOL/L (ref 136–145)
SP GR UR STRIP: 1.02 (ref 1–1.03)
SQUAMOUS #/AREA URNS HPF: ABNORMAL /HPF
UROBILINOGEN UR QL STRIP: ABNORMAL
WBC # UR STRIP: ABNORMAL /HPF
WBC NRBC COR # BLD: 21.55 10*3/MM3 (ref 3.4–10.8)
YEAST URNS QL MICRO: ABNORMAL /HPF

## 2023-03-04 PROCEDURE — 81001 URINALYSIS AUTO W/SCOPE: CPT | Performed by: EMERGENCY MEDICINE

## 2023-03-04 PROCEDURE — P9612 CATHETERIZE FOR URINE SPEC: HCPCS

## 2023-03-04 PROCEDURE — 80053 COMPREHEN METABOLIC PANEL: CPT | Performed by: EMERGENCY MEDICINE

## 2023-03-04 PROCEDURE — G0378 HOSPITAL OBSERVATION PER HR: HCPCS

## 2023-03-04 PROCEDURE — 85025 COMPLETE CBC W/AUTO DIFF WBC: CPT | Performed by: EMERGENCY MEDICINE

## 2023-03-04 PROCEDURE — 82962 GLUCOSE BLOOD TEST: CPT

## 2023-03-04 PROCEDURE — 84145 PROCALCITONIN (PCT): CPT | Performed by: EMERGENCY MEDICINE

## 2023-03-04 PROCEDURE — 71045 X-RAY EXAM CHEST 1 VIEW: CPT

## 2023-03-04 PROCEDURE — 84443 ASSAY THYROID STIM HORMONE: CPT | Performed by: INTERNAL MEDICINE

## 2023-03-04 PROCEDURE — 99285 EMERGENCY DEPT VISIT HI MDM: CPT

## 2023-03-04 PROCEDURE — 36415 COLL VENOUS BLD VENIPUNCTURE: CPT

## 2023-03-04 RX ORDER — AMLODIPINE BESYLATE 10 MG/1
10 TABLET ORAL
Status: DISCONTINUED | OUTPATIENT
Start: 2023-03-04 | End: 2023-03-11 | Stop reason: HOSPADM

## 2023-03-04 RX ORDER — ONDANSETRON 2 MG/ML
4 INJECTION INTRAMUSCULAR; INTRAVENOUS EVERY 6 HOURS PRN
Status: DISCONTINUED | OUTPATIENT
Start: 2023-03-04 | End: 2023-03-11 | Stop reason: HOSPADM

## 2023-03-04 RX ORDER — LEVOTHYROXINE SODIUM 0.15 MG/1
300 TABLET ORAL DAILY
Status: CANCELLED | OUTPATIENT
Start: 2023-03-04

## 2023-03-04 RX ORDER — LEVOTHYROXINE SODIUM 0.15 MG/1
300 TABLET ORAL DAILY
Status: DISCONTINUED | OUTPATIENT
Start: 2023-03-04 | End: 2023-03-11 | Stop reason: HOSPADM

## 2023-03-04 RX ORDER — OLANZAPINE 5 MG/1
5 TABLET ORAL 2 TIMES DAILY
Status: DISCONTINUED | OUTPATIENT
Start: 2023-03-04 | End: 2023-03-11

## 2023-03-04 RX ORDER — OLANZAPINE 5 MG/1
TABLET ORAL
Status: ON HOLD | COMMUNITY
End: 2023-03-10

## 2023-03-04 RX ORDER — IBUPROFEN 600 MG/1
1 TABLET ORAL
Status: DISCONTINUED | OUTPATIENT
Start: 2023-03-04 | End: 2023-03-11 | Stop reason: HOSPADM

## 2023-03-04 RX ORDER — ACETAMINOPHEN 325 MG/1
650 TABLET ORAL EVERY 6 HOURS PRN
Status: DISCONTINUED | OUTPATIENT
Start: 2023-03-04 | End: 2023-03-11 | Stop reason: HOSPADM

## 2023-03-04 RX ORDER — DEXTROSE MONOHYDRATE 25 G/50ML
25 INJECTION, SOLUTION INTRAVENOUS
Status: DISCONTINUED | OUTPATIENT
Start: 2023-03-04 | End: 2023-03-11 | Stop reason: HOSPADM

## 2023-03-04 RX ORDER — DESVENLAFAXINE 25 MG/1
25 TABLET, EXTENDED RELEASE ORAL DAILY
Status: CANCELLED | OUTPATIENT
Start: 2023-03-04

## 2023-03-04 RX ORDER — SODIUM CHLORIDE 0.9 % (FLUSH) 0.9 %
10 SYRINGE (ML) INJECTION EVERY 12 HOURS SCHEDULED
Status: DISCONTINUED | OUTPATIENT
Start: 2023-03-04 | End: 2023-03-11 | Stop reason: HOSPADM

## 2023-03-04 RX ORDER — LEVOTHYROXINE SODIUM 175 UG/1
175 TABLET ORAL EVERY MORNING
COMMUNITY
Start: 2022-11-29 | End: 2023-03-11 | Stop reason: HOSPADM

## 2023-03-04 RX ORDER — LEVOTHYROXINE SODIUM 175 UG/1
175 TABLET ORAL EVERY MORNING
Status: CANCELLED | OUTPATIENT
Start: 2023-03-05

## 2023-03-04 RX ORDER — HYDRALAZINE HYDROCHLORIDE 50 MG/1
50 TABLET, FILM COATED ORAL 2 TIMES DAILY
Status: ON HOLD | COMMUNITY
Start: 2023-01-29 | End: 2023-03-10

## 2023-03-04 RX ORDER — LORAZEPAM 0.5 MG/1
0.5 TABLET ORAL EVERY 6 HOURS PRN
Status: DISCONTINUED | OUTPATIENT
Start: 2023-03-04 | End: 2023-03-08 | Stop reason: SDUPTHER

## 2023-03-04 RX ORDER — PANTOPRAZOLE SODIUM 40 MG/1
40 TABLET, DELAYED RELEASE ORAL DAILY
Status: DISCONTINUED | OUTPATIENT
Start: 2023-03-04 | End: 2023-03-11 | Stop reason: HOSPADM

## 2023-03-04 RX ORDER — LISINOPRIL 20 MG/1
20 TABLET ORAL NIGHTLY
Status: DISCONTINUED | OUTPATIENT
Start: 2023-03-04 | End: 2023-03-11 | Stop reason: HOSPADM

## 2023-03-04 RX ORDER — LEVETIRACETAM 250 MG/1
250 TABLET ORAL 2 TIMES DAILY
Status: DISCONTINUED | OUTPATIENT
Start: 2023-03-04 | End: 2023-03-11 | Stop reason: HOSPADM

## 2023-03-04 RX ORDER — SODIUM CHLORIDE 9 MG/ML
40 INJECTION, SOLUTION INTRAVENOUS AS NEEDED
Status: DISCONTINUED | OUTPATIENT
Start: 2023-03-04 | End: 2023-03-11 | Stop reason: HOSPADM

## 2023-03-04 RX ORDER — HYDROCODONE BITARTRATE AND ACETAMINOPHEN 5; 325 MG/1; MG/1
1 TABLET ORAL EVERY 6 HOURS PRN
Status: DISCONTINUED | OUTPATIENT
Start: 2023-03-04 | End: 2023-03-11 | Stop reason: HOSPADM

## 2023-03-04 RX ORDER — DESVENLAFAXINE 25 MG/1
25 TABLET, EXTENDED RELEASE ORAL DAILY
Status: DISCONTINUED | OUTPATIENT
Start: 2023-03-04 | End: 2023-03-11 | Stop reason: HOSPADM

## 2023-03-04 RX ORDER — ASPIRIN 81 MG/1
81 TABLET ORAL DAILY
Status: DISCONTINUED | OUTPATIENT
Start: 2023-03-04 | End: 2023-03-11

## 2023-03-04 RX ORDER — HYDRALAZINE HYDROCHLORIDE 50 MG/1
50 TABLET, FILM COATED ORAL EVERY 12 HOURS SCHEDULED
Status: DISCONTINUED | OUTPATIENT
Start: 2023-03-04 | End: 2023-03-06

## 2023-03-04 RX ORDER — AMOXICILLIN 250 MG
2 CAPSULE ORAL NIGHTLY PRN
Status: DISCONTINUED | OUTPATIENT
Start: 2023-03-04 | End: 2023-03-11 | Stop reason: HOSPADM

## 2023-03-04 RX ORDER — LACTULOSE 10 G/15ML
15 SOLUTION ORAL EVERY 12 HOURS SCHEDULED
Status: DISCONTINUED | OUTPATIENT
Start: 2023-03-04 | End: 2023-03-08

## 2023-03-04 RX ORDER — ONDANSETRON 4 MG/1
4 TABLET, ORALLY DISINTEGRATING ORAL EVERY 8 HOURS PRN
Status: ON HOLD | COMMUNITY
End: 2023-03-21

## 2023-03-04 RX ORDER — CARVEDILOL 3.12 MG/1
3.12 TABLET ORAL 2 TIMES DAILY WITH MEALS
Status: DISCONTINUED | OUTPATIENT
Start: 2023-03-04 | End: 2023-03-11 | Stop reason: HOSPADM

## 2023-03-04 RX ORDER — NICOTINE POLACRILEX 4 MG
15 LOZENGE BUCCAL
Status: DISCONTINUED | OUTPATIENT
Start: 2023-03-04 | End: 2023-03-11 | Stop reason: HOSPADM

## 2023-03-04 RX ORDER — LORAZEPAM 0.5 MG/1
1 TABLET ORAL EVERY 6 HOURS
Status: ON HOLD | COMMUNITY
Start: 2023-02-14 | End: 2023-03-10

## 2023-03-04 RX ORDER — HYDROCODONE BITARTRATE AND ACETAMINOPHEN 5; 325 MG/1; MG/1
1 TABLET ORAL EVERY 6 HOURS PRN
Status: ON HOLD | COMMUNITY
End: 2023-03-11 | Stop reason: SDUPTHER

## 2023-03-04 RX ORDER — LORAZEPAM 0.5 MG/1
0.5 TABLET ORAL EVERY 6 HOURS
Status: DISCONTINUED | OUTPATIENT
Start: 2023-03-04 | End: 2023-03-08

## 2023-03-04 RX ORDER — INSULIN LISPRO 100 [IU]/ML
0-7 INJECTION, SOLUTION INTRAVENOUS; SUBCUTANEOUS
Status: DISCONTINUED | OUTPATIENT
Start: 2023-03-04 | End: 2023-03-04

## 2023-03-04 RX ORDER — LACTULOSE 10 G/15ML
15 SOLUTION ORAL 2 TIMES DAILY PRN
Status: ON HOLD | COMMUNITY
Start: 2022-11-24 | End: 2023-03-21

## 2023-03-04 RX ORDER — UREA 10 %
3 LOTION (ML) TOPICAL NIGHTLY PRN
Status: DISCONTINUED | OUTPATIENT
Start: 2023-03-04 | End: 2023-03-11 | Stop reason: HOSPADM

## 2023-03-04 RX ORDER — EPOETIN ALFA-EPBX 10000 [IU]/ML
INJECTION, SOLUTION INTRAVENOUS; SUBCUTANEOUS
Status: ON HOLD | COMMUNITY
Start: 2022-09-28 | End: 2023-03-04 | Stop reason: SDUPTHER

## 2023-03-04 RX ORDER — SODIUM CHLORIDE 0.9 % (FLUSH) 0.9 %
10 SYRINGE (ML) INJECTION AS NEEDED
Status: DISCONTINUED | OUTPATIENT
Start: 2023-03-04 | End: 2023-03-11 | Stop reason: HOSPADM

## 2023-03-04 RX ORDER — INSULIN LISPRO 100 [IU]/ML
0-9 INJECTION, SOLUTION INTRAVENOUS; SUBCUTANEOUS
Status: DISCONTINUED | OUTPATIENT
Start: 2023-03-04 | End: 2023-03-07 | Stop reason: SDUPTHER

## 2023-03-04 RX ORDER — FOLIC ACID 1 MG/1
1 TABLET ORAL DAILY
Status: DISCONTINUED | OUTPATIENT
Start: 2023-03-04 | End: 2023-03-11 | Stop reason: HOSPADM

## 2023-03-04 RX ORDER — ROPINIROLE 2 MG/1
2 TABLET, FILM COATED ORAL NIGHTLY
Status: DISCONTINUED | OUTPATIENT
Start: 2023-03-04 | End: 2023-03-11 | Stop reason: HOSPADM

## 2023-03-04 RX ADMIN — OLANZAPINE 5 MG: 5 TABLET ORAL at 23:16

## 2023-03-04 RX ADMIN — FOLIC ACID 1 MG: 1 TABLET ORAL at 23:16

## 2023-03-04 RX ADMIN — CARVEDILOL 3.12 MG: 3.12 TABLET, FILM COATED ORAL at 23:14

## 2023-03-04 RX ADMIN — ASPIRIN 81 MG: 81 TABLET, COATED ORAL at 18:34

## 2023-03-04 RX ADMIN — ROPINIROLE 2 MG: 2 TABLET, FILM COATED ORAL at 23:16

## 2023-03-04 RX ADMIN — INSULIN GLARGINE-YFGN 3 UNITS: 100 INJECTION, SOLUTION SUBCUTANEOUS at 23:17

## 2023-03-04 RX ADMIN — HYDRALAZINE HYDROCHLORIDE 50 MG: 50 TABLET, FILM COATED ORAL at 23:17

## 2023-03-04 RX ADMIN — LORAZEPAM 0.5 MG: 0.5 TABLET ORAL at 18:34

## 2023-03-04 RX ADMIN — PANTOPRAZOLE SODIUM 40 MG: 40 TABLET, DELAYED RELEASE ORAL at 18:33

## 2023-03-04 RX ADMIN — LISINOPRIL 20 MG: 20 TABLET ORAL at 23:16

## 2023-03-04 RX ADMIN — HYDROCODONE BITARTRATE AND ACETAMINOPHEN 1 TABLET: 5; 325 TABLET ORAL at 18:34

## 2023-03-04 RX ADMIN — Medication 10 ML: at 23:27

## 2023-03-04 RX ADMIN — LEVETIRACETAM 250 MG: 250 TABLET, FILM COATED ORAL at 23:16

## 2023-03-04 RX ADMIN — SERTRALINE 50 MG: 50 TABLET, FILM COATED ORAL at 23:16

## 2023-03-04 NOTE — H&P
Internal medicine history and physical  INTERNAL MEDICINE   [facility]       Patient Identification:  Name: Zaina Martinez  Age: 57 y.o.  Sex: female  :  1965  MRN: [id number]                   Primary Care Physician: Norman Serrato MD                               Date of admission:3/4/2023    Chief Complaint: Sent from nursing home for evaluation of hypoglycemia when she was found to have blood sugar of 40 earlier today.    History of Present Illness:   Source of information patient himself with limited, review of records and discussion with ER provider.  Patient is a 57-year-old female who has complicated past medical history including history of CVA, recent right femur fracture,paroxysmal atrial fibrillation, seizure disorder, hypothyroidism, chronic diastolic congestive heart failure, coronary artery disease anemia end-stage renal disease on hemodialysis and history of rheumatoid arthritis and prior history of intracranial hemorrhage who is on  dialysis schedule and has significant brittle diabetes and only takes 3 units of Lantus at night and as needed insulin based on her sliding scale was recently hospitalized for 10 days from 2023 through 2023 for weakness and abnormal urinalysis with blood cells.  Prior to that she had another prolonged hospitalization for respiratory failure due to increased use of her pain medications and volume overload in the setting of renal failure and had life-threatening perinephric hematoma following attempted ureteral stent exchange eventually leading to stopping of her anticoagulation therapy.  3 years after discharge from that complicated hospitalization patient was brought back to the hospital for being unable to be cared for by family members and has been and after staying 10 days in the hospital where she was extensively evaluated for cause of leukocytosis including infectious disease consultation and empiric  antibiotic therapy patient was eventually discharged on dialysis with right IJ tunneled catheter to skilled facility.  According to patient who feels weak and does not want to talk but with effort was able to tell me that he is following Monday Wednesday Friday schedule for dialysis and was doing okay until today when she felt weak and was noted by staff at the nursing home that her blood sugar is only 40.  EMS was called and patient received glucagon and an amp of D50 in route to the emergency room with improvement in her blood sugar to be 92.  Patient is feeling better.  Work-up did reveal leukocytosis anemia and hyponatremia and elevated procalcitonin level.  Because of combination of these abnormalities and unexplained leukocytosis that she had with negative work-up partially improved with empiric antibiotic therapy for 5 days and her recurrent hypoglycemia with leukocytosis it was requested that patient would benefit from hospitalization and may be further work-up by infectious disease service.  Patient denies any worsening shortness of breath or right-sided chest pain but does have persistent right-sided effusion with dense atelectasis which is unchanged from previous x-rays performed last month.    Past Medical History:  Past Medical History:   Diagnosis Date   • Acute CVA (cerebrovascular accident) (Formerly [facility]) 05/01/2022   • Acute on chronic diastolic CHF (congestive heart failure) (Formerly [facility])    • Anemia    • CAD (coronary artery disease) 12/20/2021   • Diabetes (Formerly [facility])    • Disease of thyroid gland    • GERD (gastroesophageal reflux disease)    • History of intracranial hemorrhage 5/1/2022   • Hyperlipidemia 11/30/2018   • Hypertension    • Rheumatoid arthritis ([facility])    • Stage 5 chronic kidney disease (Formerly [facility])      Past Surgical History:  Past Surgical History:   Procedure Laterality Date   • CHOLECYSTECTOMY WITH INTRAOPERATIVE CHOLANGIOGRAM N/A 1/10/2022    Procedure: Laparoscopic cholecystectomy with intraoperative  cholangiogram;  Surgeon: Ramana Raygoza MD;  Location: [facility] OR;  Service: General;  Laterality: N/A;   • CYSTOSCOPY W/ URETERAL STENT PLACEMENT Left 9/29/2022    Procedure: CYSTOSCOPY URETERAL STENT INSERTION WITH RETROGRADE PYELOGRAM;  Surgeon: Bryant Phan Jr., MD;  Location: [facility] OR;  Service: Urology;  Laterality: Left;   • CYSTOSCOPY W/ URETERAL STENT PLACEMENT Left 1/10/2023    Procedure: CYSTOSCOPY LEFT STENT REMOVAL;  Surgeon: Bryant Phan Jr., MD;  Location: [facility] OR;  Service: Urology;  Laterality: Left;   • EMBOLIZATION MESENTERIC ARTERY N/A 1/1/2023    Procedure: LEFT KIDNEY EMBOLIZATION;  Surgeon: Bijan Ordoñez MD;  Location: [facility] OR 18/19;  Service: Interventional Radiology;  Laterality: N/A;   • EYE SURGERY     • FEMUR IM NAILING/RODDING Right 11/10/2022    Procedure: FEMUR INTRAMEDULLARY NAILING/RODDING;  Surgeon: Glen Pierce MD;  Location: [facility] OR;  Service: Orthopedics;  Laterality: Right;   • HIP INTERTROCHANTERIC NAILING Right 11/10/2022    Procedure: HIP INTERTROCHANTERIC NAILING;  Surgeon: Glen Pierce MD;  Location: [facility] OR;  Service: Orthopedics;  Laterality: Right;   • HYSTERECTOMY     • INSERT CENTRAL LINE AT BEDSIDE  12/31/2022        • INSERTION HEMODIALYSIS CATHETER N/A 12/6/2021    Procedure: HEMODIALYSIS CATHETER INSERTION;  Surgeon: Keli Salazar MD;  Location: [facility] OR 18/19;  Service: Vascular;  Laterality: N/A;   • INSERTION HEMODIALYSIS CATHETER N/A 5/3/2022    Procedure: TUNNELED CATHETER PLACEMENT;  Surgeon: Keli Salazar MD;  Location: [facility] OR;  Service: Vascular;  Laterality: N/A;   • MUSCLE BIOPSY Left 6/15/2022    Procedure: Left quadriceps muscle biopsy;  Surgeon: Marques Ma MD;  Location: [facility] OR;  Service: Neurosurgery;  Laterality: Left;   • URETEROSCOPY LASER LITHOTRIPSY WITH STENT INSERTION Left 12/30/2022    Procedure: CYSTOSCOPY WITH  LEFT  URETEROSCOPY  LEFT STENT EXCHANGE;  Surgeon: Bryant Phan Jr., MD;  Location: [facility];  Service: Urology;  Laterality: Left;      Home Meds:  Medications Prior to Admission   Medication Sig Dispense Refill Last Dose   • acetaminophen (TYLENOL) 325 MG tablet Take 2 tablets by mouth Every 6 (Six) Hours As Needed for Mild Pain, Fever or Headache.   Past Week   • amLODIPine (NORVASC) 10 MG tablet Take 1 tablet by mouth Daily. 30 tablet 0 3/3/2023   • apixaban (ELIQUIS) 2.5 MG tablet tablet Take 1 tablet by mouth Every 12 (Twelve) Hours.      • carvedilol (COREG) 3.125 MG tablet Take 1 tablet by mouth 2 (Two) Times a Day With Meals. 60 tablet 0 3/3/2023   • Desvenlafaxine Succinate ER 25 MG tablet sustained-release 24 hour Take 1 tablet by mouth Daily. 30 tablet 0 3/3/2023   • folic acid (FOLVITE) 1 MG tablet Take 1 tablet by mouth Daily.   3/3/2023   • glucagon (GLUCAGEN) 1 MG injection Inject 1 mg into the appropriate muscle as directed by prescriber.      • HYDROcodone-acetaminophen (NORCO) 5-325 MG per tablet Take 1 tablet by mouth Every 6 (Six) Hours As Needed.   3/3/2023   • insulin glargine (LANTUS, SEMGLEE) 100 UNIT/ML injection Inject 3 Units under the skin into the appropriate area as directed Every Night. 10 mL 1 3/3/2023   • insulin lispro (ADMELOG) 100 UNIT/ML injection Inject 0-7 Units under the skin into the appropriate area as directed 3 (Three) Times a Day Before Meals.   3/3/2023   • lactulose (CHRONULAC) 10 GM/15ML solution Take 15 mL by mouth Every 12 (Twelve) Hours.      • levETIRAcetam (Keppra) 250 MG tablet Take 1 tablet by mouth 2 (Two) Times a Day. 60 tablet 0 3/4/2023   • levothyroxine (Synthroid) 150 MCG tablet Take 2 tablets by mouth Daily. 60 tablet 0 3/4/2023   • lidocaine (LIDODERM) 5 % Place 1 patch on the skin as directed by provider Daily. Remove & Discard patch within 12 hours or as directed by MD   Past Month   • lisinopril (PRINIVIL,ZESTRIL) 20 MG tablet Take 1  tablet by mouth Every Night.   3/3/2023   • LORazepam (ATIVAN) 0.5 MG tablet Take 1 tablet by mouth Every 6 (Six) Hours As Needed for Anxiety. 12 tablet 0 3/3/2023   • LORazepam (ATIVAN) 0.5 MG tablet Take 1 tablet by mouth Every 6 (Six) Hours.      • melatonin 3 MG tablet Take 1 tablet by mouth At Night As Needed for Sleep.   Past Month   • Methoxy PEG-Epoetin Beta (MIRCERA IJ) 100 mcg Every 14 (Fourteen) Days.      • ondansetron ODT (ZOFRAN-ODT) 4 MG disintegrating tablet Place 1 tablet on the tongue Every 8 (Eight) Hours As Needed for Nausea or Vomiting.   Past Month   • pantoprazole (PROTONIX) 40 MG EC tablet Take 1 tablet by mouth Daily. 90 tablet 0 3/4/2023   • rOPINIRole (REQUIP) 2 MG tablet Take 1 tablet by mouth Every Night. 30 tablet 0 3/3/2023   • sennosides-docusate (PERICOLACE) 8.6-50 MG per tablet Take 2 tablets by mouth At Night As Needed for Constipation.   Past Month   • sertraline (ZOLOFT) 50 MG tablet Take 1 tablet by mouth Daily.   3/3/2023   • aspirin 81 MG EC tablet Take 1 tablet by mouth Daily. 30 tablet 0    • hydrALAZINE (APRESOLINE) 50 MG tablet 1 tablet 2 (Two) Times a Day.      • levothyroxine (SYNTHROID, LEVOTHROID) 175 MCG tablet Take 1 tablet by mouth Every Morning.      • OLANZapine (zyPREXA) 5 MG tablet olanzapine 5 mg tablet   TAKE 1 TABLET BY MOUTH TWICE DAILY        Current Meds:   No current facility-administered medications for this encounter.  Allergies:  Allergies   Allergen Reactions   • Contrast Dye (Echo Or Unknown Ct/Mr) Confusion   • Iodinated Contrast Media Unknown - High Severity   • Ibuprofen Other (See Comments)     Kidney disease   • Prochlorperazine Other (See Comments)     Dystonic reaction          Social History:   Social History     Tobacco Use   • Smoking status: Never   • Smokeless tobacco: Never   Substance Use Topics   • Alcohol use: Never      Family History:  Family History   Problem Relation Age of Onset   • Malig Hyperthermia Neg Hx           Review of  "Systems  See history of present illness and past medical history.   Feeling somewhat better but complains of generalized weakness lack of energy and denies any fever and chills.      Vitals:   /72 (BP Location: Left arm, Patient Position: Lying)   Pulse 84   Temp 96 °F (35.6 °C) (Oral)   Resp 16   Ht 157.5 cm (62\")   Wt 58.8 kg (129 lb 10.1 oz)   SpO2 98%   BMI 23.71 kg/m²   I/O: No intake or output data in the 24 hours ending 03/04/23 6034  Exam:  Patient is examined using the personal protective equipment as per guidelines from infection control for this particular patient as enacted.  Hand washing was performed before and after patient interaction.  General Appearance:   Chronically ill-appearing female who does not appear to be in any acute distress appears weak and tired.   Head:    Normocephalic, without obvious abnormality, atraumatic   Eyes:    PERRL, conjunctiva/corneas clear, EOM's intact, both eyes   Ears:    Normal external ear canals, both ears   Nose:   Nares normal, septum midline, mucosa normal, no drainage    or sinus tenderness   Throat:   Lips, tongue, gums normal; oral mucosa pink and moist   Neck:   Supple, symmetrical, trachea midline, no adenopathy;     thyroid:  no enlargement/tenderness/nodules; no carotid    bruit or JVD   Back:     Symmetric, no curvature, ROM normal, no CVA tenderness   Lungs:     Clear to auscultation bilaterally, respirations unlabored   Chest Wall:   Right IJ tunneled catheter in place    Heart:   S1-S2 regular   Abdomen:    Soft nontender   Extremities:   Extremities normal, atraumatic, no cyanosis or edema   Pulses:   Pulses palpable in all extremities; symmetric all extremities   Skin:  Chronic skin changes noted   Neurologic:  Grossly nonfocal examination       Data Review:      I reviewed the patient's new clinical results.  Results from last 7 days   Lab Units 03/04/23  0931   WBC 10*3/mm3 21.55*   HEMOGLOBIN g/dL 9.9*   PLATELETS 10*3/mm3 293 "     Results from last 7 days   Lab Units 03/04/23  1047   SODIUM mmol/L 129*   POTASSIUM mmol/L 4.2   CHLORIDE mmol/L 91*   CO2 mmol/L 30.2*   BUN mg/dL 19   CREATININE mg/dL 2.38*   CALCIUM mg/dL 8.2*   GLUCOSE mg/dL 143*   CT Abdomen Pelvis Without Contrast    Result Date: 2/9/2023   1. Left basilar consolidation. Some of this may be atelectasis. Pneumonia is not excluded. 2. Previously described left perinephric hematoma appears larger than on the study. 3. Thick-walled appearance to urinary bladder. 4. Potentially thick-walled appearance to the rectum. Correlation with any symptoms of proctitis is suggested.  Radiation dose reduction techniques were utilized, including automated exposure control and exposure modulation based on body size.  This report was finalized on 2/9/2023 12:38 AM by Dr. Cherri Rodriguez M.D.      CT Chest Without Contrast Diagnostic    Result Date: 2/9/2023   1. Left basilar consolidation. Some of this may be atelectasis. Pneumonia is not excluded. 2. Previously described left perinephric hematoma appears larger than on the study. 3. Thick-walled appearance to urinary bladder. 4. Potentially thick-walled appearance to the rectum. Correlation with any symptoms of proctitis is suggested.  Radiation dose reduction techniques were utilized, including automated exposure control and exposure modulation based on body size.  This report was finalized on 2/9/2023 12:38 AM by Dr. Cherri Rodrigeuz M.D.      XR Chest 1 View    Result Date: 2/5/2023   Stable findings when compared to the prior examination dated 02/01/2023. Again noted are areas of atelectatic change or pneumonia within the left base in addition to a small-to-moderate size left pleural effusion. The cardiomediastinal silhouette is enlarged but stable in size.  A right IJ central venous catheter is unchanged in position.  This report was finalized on 2/5/2023 4:08 PM by Dr. Lucas Henriquez M.D.      Microbiology Results (last 10 days)      ** No results found for the last 240 hours. **        Brief Urine Lab Results  (Last result in the past 365 days)      Color   Clarity   Blood   Leuk Est   Nitrite   Protein   CREAT   Urine HCG        03/04/23 1230 Dark Yellow   Turbid   Moderate (2+)   Large (3+)   Negative   >=300 mg/dL (3+)               No orders to display         Assessment:  Active Hospital Problems    Diagnosis  POA   • **Hypoglycemia due to type 2 diabetes mellitus ([facility]) [E11.649]  Yes   • Ureteral stent present [Z96.0]  Not Applicable   • Pleural effusion [J90]  Yes   • History of stroke- R MCA s/p TPA with subsequent ICH with debility [Z86.73]  Not Applicable   • Hyponatremia [E87.1]  Yes   • ESRD (end stage renal disease) ([facility]) [N18.6]  Yes   • Abnormal urinalysis [R82.90]  Yes   • Hypothyroidism [E03.9]  Yes   • Rheumatoid arthritis ([facility]) [M06.9]  Yes   • Type 2 diabetes mellitus with kidney complication, with long-term current use of insulin ([facility]) [E11.29, Z79.4]  Not Applicable   • Leukocytosis [D72.829]  Yes   • Anemia [D64.9]  Yes       Medical decision making/care plan: See admitting orders  · Hypoglycemia-likely multifactorial including extreme sensitivity to insulin in the setting of declining renal function and end-stage renal disease with lack of gluconeogenesis reserve and sensitivity to insulin.  Plan is to hold her insulin provided with Accu-Cheks and as needed sliding scale coverage and watch for future episodes of hypoglycemia.  · End-stage renal disease on hemodialysis-consult her nephrology service and continue with her program.  · Known abnormal urinalysis with leukocytosis in the setting of end-stage renal disease with no urinary symptoms-significance unclear monitor closely for evolving signs and symptoms of sepsis.  But further signs of other causes of evolving sepsis such as infected complicated right pleural effusion even though it is stable and possible infected hemodialysis catheter.  · Rheumatoid  arthritis-monitor her for any recurrence of symptoms.  Patient recently had her steroids stopped and this presentation of hypoglycemia could very well be due to relative adrenal insufficiency.  We will check morning ACTH level and consider cosyntropin stimulation test if concern for relative adrenal insufficiency explaining her recurrent hypoglycemia and extreme sensitivity to minuscule dose of insulin.  · Hypothyroidism-continue thyroid replacement therapy.  · Seizure disorder-continue her antiseizure medications.  · Hypertension-continue antihypertensive regimen.  · History of CVA and history of intracranial bleed-closely monitor.  · Hyponatremia-due to end-stage renal disease and electrolyte shift appears to be chronic plan is to monitor.    Debbie Quick MD   3/4/2023  17:31 EST    Parts of this note may be an electronic transcription/translation of spoken language to printed text using the Dragon dictation system.

## 2023-03-04 NOTE — ED PROVIDER NOTES
EMERGENCY DEPARTMENT ENCOUNTER    Room Number:  12/12  Date seen:  3/4/2023  PCP: Norman Serrato MD  Historian: Patient, nursing home records, EMS      HPI:  Chief Complaint: Hypoglycemia  A complete HPI/ROS/PMH/PSH/SH/FH are unobtainable due to:   Context: Zaina Martinez is a 57 y.o. female who presents to the ED c/o hypoglycemia.  Patient states that she has had poor appetite recently and is not really hungry to eat or drink much.  She takes only 3 units of Lantus insulin in the evening.  She does take regular insulin on a sliding scale.  She does not take oral medications.  Patient was apparently found at nursing home with a glucose of 40.  She was given glucagon x2.  EMS was called and she was given 1 amp of D50 in route.  Upon initial ED arrival sugar is 92 and she feels better.  Patient does have end-stage renal disease and gets dialysis Monday Wednesday Friday.  She did have full dialysis yesterday.        MEDICAL RECORD REVIEW (non ED)  I reviewed prior medical records including recent admission from February of this year.  Patient has multiple medical problems including atrial fibrillation, type 2 diabetes, rheumatoid arthritis and end-stage renal disease on hemodialysis.  Patient with very complicated medical history and nursing care was advised.    PAST MEDICAL HISTORY  Active Ambulatory Problems     Diagnosis Date Noted   • Renal insufficiency 03/16/2018   • Hypertensive disorder 08/25/2021   • Hypothyroidism 11/29/2021   • Type 2 diabetes mellitus with kidney complication, with long-term current use of insulin ([facility]) 05/01/2018   • Rheumatoid arthritis ([facility]) 03/30/2021   • Angioedema 11/30/2021   • Esophageal dysmotility 10/15/2021   • Anemia 03/16/2018   • Medically noncompliant 12/01/2021   • Myocardial infarction due to demand ischemia ([facility]) 12/01/2021   • Enteritis 12/03/2021   • PRES (posterior reversible encephalopathy syndrome) 12/05/2021   • Urine retention 12/08/2021   • Klebsiella infection  12/08/2021   • Superficial thrombophlebitis 12/10/2021   • Generalized weakness 12/19/2021   • ESRD (end stage renal disease) ([facility]) 12/20/2021   • CAD (coronary artery disease) 12/20/2021   • Abnormal urinalysis 12/20/2021   • Chronic diastolic CHF (congestive heart failure) ([facility]) 12/25/2021   • Pyelonephritis 01/09/2022   • Calculus of gallbladder with acute on chronic cholecystitis without obstruction 01/09/2022   • Pleural effusion on right 01/09/2022   • Anemia due to chronic kidney disease, on chronic dialysis ([facility]) 01/11/2022   • Abnormal findings on diagnostic imaging of other specified body structures 04/04/2017   • Acute upper respiratory infection 10/31/2018   • Agitation 03/30/2021   • Alkaline phosphatase raised 03/16/2018   • Casts present in urine 03/29/2021   • Cellulitis of toe 11/15/2019   • Hip pain 06/19/2020   • Community acquired pneumonia 02/13/2017   • Depressive disorder 10/31/2018   • Diarrhea of presumed infectious origin 03/30/2021   • Difficult or painful urination 08/11/2020   • Disease due to severe acute respiratory syndrome coronavirus 2 (SARS-CoV-2) 03/30/2021   • Dyspnea 11/15/2019   • Encounter for follow-up examination after completed treatment for conditions other than malignant neoplasm 02/13/2017   • H/O: hypothyroidism 08/25/2021   • Hyperlipidemia 11/30/2018   • Hypomagnesemia 03/30/2021   • Intractable vomiting with nausea 03/29/2021   • 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   • Need for influenza vaccination 11/30/2018   • Restless legs 11/15/2019   • Noncompliance with treatment 10/31/2018   • Shoulder pain 06/19/2020   • Acute UTI (urinary tract infection) 07/17/2018   • Metabolic encephalopathy 02/24/2022   • Abnormal findings on diagnostic imaging of abdomen 02/24/2022   • Status post cholecystectomy 02/24/2022   • Hyponatremia 02/24/2022   • Acute metabolic encephalopathy 04/27/2022   • Encephalopathy, toxic 04/28/2022   • Acute CVA (cerebrovascular accident) ([facility])  05/01/2022   • History of intracranial hemorrhage 05/01/2022   • Stroke ([facility]) 05/13/2022   • Abnormal urinalysis 06/24/2022   • Diabetic muscle infarction ([facility]) 07/01/2022   • Other insomnia 07/01/2022   • Altered mental status 07/27/2022   • History of stroke- R MCA s/p TPA with subsequent ICH with debility 07/27/2022   • Heart murmur 07/27/2022   • Bradycardia 07/27/2022   • Left lower lobe pneumonia 07/27/2022   • Metabolic encephalopathy 07/29/2022   • Pericardial effusion 08/20/2022   • Pleural effusion 08/20/2022   • Acute pulmonary edema ([facility]) 09/03/2022   • Atrial flutter ([facility]) 09/04/2022   • Chronic systolic CHF (congestive heart failure) ([facility]) 09/04/2022   • Thrombus of venous dialysis catheter ([facility]) 09/04/2022   • Sepsis without acute organ dysfunction ([facility]) 09/27/2022   • Type 2 diabetes mellitus with hyperglycemia, with long-term current use of insulin ([facility]) 10/06/2022   • Acute respiratory failure with hypoxia ([facility]) 10/06/2022   • Acute on chronic systolic CHF (congestive heart failure) ([facility]) 10/06/2022   • Hyperkalemia 10/12/2022   • Acute respiratory failure with hypoxia ([facility]) 10/27/2022   • Other hypervolemia 10/31/2022   • Hematoma of right hip, initial encounter 11/09/2022   • Ureteral stent present 11/09/2022   • Closed intertrochanteric fracture of right femur ([facility]) 11/09/2022   • Witnessed seizure-like activity ([facility]) 12/04/2022   • Acute respiratory failure with hypercapnia ([facility]) 12/26/2022   • Opioid use 12/26/2022   • Diabetic muscle infarction ([facility]) 12/30/2022   • Acute posthemorrhagic anemia 12/31/2022   • Kidney hematoma 12/31/2022   • Pyuria 02/01/2023   • Severe malnutrition ([facility]) 02/02/2023     Resolved Ambulatory Problems     Diagnosis Date Noted   • Hypertensive urgency 11/30/2021   • Leukocytosis 03/16/2018   • Leukocytosis 02/24/2022     Past Medical History:   Diagnosis Date   • Acute on chronic diastolic CHF (congestive heart failure) (HCC)    • Diabetes (HCC)    • Disease of  thyroid gland    • GERD (gastroesophageal reflux disease)    • Hypertension    • Stage 5 chronic kidney disease (HCC)          PAST SURGICAL HISTORY  Past Surgical History:   Procedure Laterality Date   • CHOLECYSTECTOMY WITH INTRAOPERATIVE CHOLANGIOGRAM N/A 1/10/2022    Procedure: Laparoscopic cholecystectomy with intraoperative cholangiogram;  Surgeon: Ramana Raygoza MD;  Location: [facility];  Service: General;  Laterality: N/A;   • CYSTOSCOPY W/ URETERAL STENT PLACEMENT Left 9/29/2022    Procedure: CYSTOSCOPY URETERAL STENT INSERTION WITH RETROGRADE PYELOGRAM;  Surgeon: Bryant Phan Jr., MD;  Location: [facility];  Service: Urology;  Laterality: Left;   • CYSTOSCOPY W/ URETERAL STENT PLACEMENT Left 1/10/2023    Procedure: CYSTOSCOPY LEFT STENT REMOVAL;  Surgeon: Bryant Phan Jr., MD;  Location: [facility];  Service: Urology;  Laterality: Left;   • EMBOLIZATION MESENTERIC ARTERY N/A 1/1/2023    Procedure: LEFT KIDNEY EMBOLIZATION;  Surgeon: Bijan Ordoñez MD;  Location: [facility] OR 18/19;  Service: Interventional Radiology;  Laterality: N/A;   • EYE SURGERY     • FEMUR IM NAILING/RODDING Right 11/10/2022    Procedure: FEMUR INTRAMEDULLARY NAILING/RODDING;  Surgeon: Glen Pierce MD;  Location: [facility];  Service: Orthopedics;  Laterality: Right;   • HIP INTERTROCHANTERIC NAILING Right 11/10/2022    Procedure: HIP INTERTROCHANTERIC NAILING;  Surgeon: Glen Pierce MD;  Location: [facility];  Service: Orthopedics;  Laterality: Right;   • HYSTERECTOMY     • INSERT CENTRAL LINE AT BEDSIDE  12/31/2022        • INSERTION HEMODIALYSIS CATHETER N/A 12/6/2021    Procedure: HEMODIALYSIS CATHETER INSERTION;  Surgeon: Keli Salazar MD;  Location: [facility] OR 18/19;  Service: Vascular;  Laterality: N/A;   • INSERTION HEMODIALYSIS CATHETER N/A 5/3/2022    Procedure: TUNNELED CATHETER PLACEMENT;  Surgeon: Keli Salazar MD;  Location:   [facility];  Service: Vascular;  Laterality: N/A;   • MUSCLE BIOPSY Left 6/15/2022    Procedure: Left quadriceps muscle biopsy;  Surgeon: Marques Ma MD;  Location: [facility] OR;  Service: Neurosurgery;  Laterality: Left;   • URETEROSCOPY LASER LITHOTRIPSY WITH STENT INSERTION Left 12/30/2022    Procedure: CYSTOSCOPY WITH LEFT  URETEROSCOPY  LEFT STENT EXCHANGE;  Surgeon: Bryant Phan Jr., MD;  Location: [facility] OR;  Service: Urology;  Laterality: Left;         FAMILY HISTORY  Family History   Problem Relation Age of Onset   • Malig Hyperthermia Neg Hx          SOCIAL HISTORY  Social History     Socioeconomic History   • Marital status:      Spouse name: Marv   Tobacco Use   • Smoking status: Never   • Smokeless tobacco: Never   Vaping Use   • Vaping Use: Never used   Substance and Sexual Activity   • Alcohol use: Never   • Drug use: Never   • Sexual activity: Defer         ALLERGIES  Contrast dye (echo or unknown ct/mr), Ibuprofen, and Prochlorperazine        REVIEW OF SYSTEMS  Review of Systems   Constitutional: Positive for appetite change (Diminished appetite) and fatigue. Negative for fever.   Respiratory: Negative for shortness of breath.    Cardiovascular: Negative for chest pain.   All other systems reviewed and are negative.           PHYSICAL EXAM  ED Triage Vitals [03/04/23 0842]   Temp Heart Rate Resp BP SpO2   97.1 °F (36.2 °C) 72 18 153/78 100 %      Temp src Heart Rate Source Patient Position BP Location FiO2 (%)   Tympanic Monitor Sitting Right arm --       Physical Exam    GENERAL: Chronically ill-appearing female in no obvious distress.  Triage vitals reviewed and are benign.  Temperature 97.1.  Blood pressure 153/78.  Pulse 72.  HENT: nares patent  EYES: no scleral icterus  CV: regular rhythm, regular rate  RESPIRATORY: normal effort, clear to auscultation bilaterally  ABDOMEN: soft, morbidly obese  MUSCULOSKELETAL: no deformity  NEURO: Strength-moderate generalized  weakness.  Sensation and coordination are grossly intact.  Speech and mentation are unremarkable  SKIN: warm, dry      Vital signs and nursing notes reviewed.          LAB RESULTS  Recent Results (from the past 24 hour(s))   POC Glucose Once    Collection Time: 03/04/23  9:00 AM    Specimen: Blood   Result Value Ref Range    Glucose 92 70 - 130 mg/dL   CBC Auto Differential    Collection Time: 03/04/23  9:31 AM    Specimen: Arm, Left; Blood   Result Value Ref Range    WBC 21.55 (H) 3.40 - 10.80 10*3/mm3    RBC 3.93 3.77 - 5.28 10*6/mm3    Hemoglobin 9.9 (L) 12.0 - 15.9 g/dL    Hematocrit 31.8 (L) 34.0 - 46.6 %    MCV 80.9 79.0 - 97.0 fL    MCH 25.2 (L) 26.6 - 33.0 pg    MCHC 31.1 (L) 31.5 - 35.7 g/dL    RDW 16.9 (H) 12.3 - 15.4 %    RDW-SD 48.5 37.0 - 54.0 fl    MPV 9.0 6.0 - 12.0 fL    Platelets 293 140 - 450 10*3/mm3    Neutrophil % 89.7 (H) 42.7 - 76.0 %    Lymphocyte % 5.2 (L) 19.6 - 45.3 %    Monocyte % 4.2 (L) 5.0 - 12.0 %    Eosinophil % 0.1 (L) 0.3 - 6.2 %    Basophil % 0.1 0.0 - 1.5 %    Immature Grans % 0.7 (H) 0.0 - 0.5 %    Neutrophils, Absolute 19.32 (H) 1.70 - 7.00 10*3/mm3    Lymphocytes, Absolute 1.11 0.70 - 3.10 10*3/mm3    Monocytes, Absolute 0.91 (H) 0.10 - 0.90 10*3/mm3    Eosinophils, Absolute 0.03 0.00 - 0.40 10*3/mm3    Basophils, Absolute 0.03 0.00 - 0.20 10*3/mm3    Immature Grans, Absolute 0.15 (H) 0.00 - 0.05 10*3/mm3    nRBC 0.0 0.0 - 0.2 /100 WBC   Comprehensive Metabolic Panel    Collection Time: 03/04/23 10:47 AM    Specimen: Arm, Right; Blood   Result Value Ref Range    Glucose 143 (H) 65 - 99 mg/dL    BUN 19 6 - 20 mg/dL    Creatinine 2.38 (H) 0.57 - 1.00 mg/dL    Sodium 129 (L) 136 - 145 mmol/L    Potassium 4.2 3.5 - 5.2 mmol/L    Chloride 91 (L) 98 - 107 mmol/L    CO2 30.2 (H) 22.0 - 29.0 mmol/L    Calcium 8.2 (L) 8.6 - 10.5 mg/dL    Total Protein 6.1 6.0 - 8.5 g/dL    Albumin 2.7 (L) 3.5 - 5.2 g/dL    ALT (SGPT) 5 1 - 33 U/L    AST (SGOT) 25 1 - 32 U/L    Alkaline Phosphatase 161  (H) 39 - 117 U/L    Total Bilirubin 0.5 0.0 - 1.2 mg/dL    Globulin 3.4 gm/dL    A/G Ratio 0.8 g/dL    BUN/Creatinine Ratio 8.0 7.0 - 25.0    Anion Gap 7.8 5.0 - 15.0 mmol/L    eGFR 23.3 (L) >60.0 mL/min/1.73   Procalcitonin    Collection Time: 03/04/23 10:47 AM    Specimen: Arm, Right; Blood   Result Value Ref Range    Procalcitonin 1.76 (H) 0.00 - 0.25 ng/mL   POC Glucose Once    Collection Time: 03/04/23 11:16 AM    Specimen: Blood   Result Value Ref Range    Glucose 134 (H) 70 - 130 mg/dL   Urinalysis With Microscopic If Indicated (No Culture) - Urine, Catheter    Collection Time: 03/04/23 12:30 PM    Specimen: Urine, Catheter   Result Value Ref Range    Color, UA Dark Yellow (A) Yellow, Straw    Appearance, UA Turbid (A) Clear    pH, UA 8.0 5.0 - 8.0    Specific Gravity, UA 1.019 1.005 - 1.030    Glucose, UA Negative Negative    Ketones, UA Negative Negative    Bilirubin, UA Negative Negative    Blood, UA Moderate (2+) (A) Negative    Protein, UA >=300 mg/dL (3+) (A) Negative    Leuk Esterase, UA Large (3+) (A) Negative    Nitrite, UA Negative Negative    Urobilinogen, UA 0.2 E.U./dL 0.2 - 1.0 E.U./dL   Urinalysis, Microscopic Only - Urine, Catheter    Collection Time: 03/04/23 12:30 PM    Specimen: Urine, Catheter   Result Value Ref Range    RBC, UA 3-5 (A) None Seen, 0-2 /HPF    WBC, UA 13-20 (A) None Seen, 0-2 /HPF    Bacteria, UA None Seen None Seen /HPF    Squamous Epithelial Cells, UA 0-2 None Seen, 0-2 /HPF    Yeast, UA Small/1+ Yeast None Seen /HPF    Hyaline Casts, UA 0-2 None Seen /LPF    Methodology Manual Light Microscopy        Ordered the above labs and independently interpreted results. My findings will be discussed in the medical decision making section below        RADIOLOGY  XR Chest 1 View    Result Date: 3/4/2023  ONE VIEW PORTABLE CHEST AT 11:25 AM  HISTORY: Hypoglycemia. Leukocytosis. Chronic renal failure.  FINDINGS: The lungs are well-expanded with persistent increased density in the  lower left chest that obscures the diaphragm and consistent with moderately large left pleural effusion and adjacent dense atelectasis as also noted on the chest CT scan dated 02/08/2023. This is also similar to the chest x-ray dated 02/05/2023. The right lung remains clear. The heart remains enlarged. A large gauge right-sided vascular catheter ends in the right atrium without change.  This report was finalized on 3/4/2023 11:51 AM by Dr. Ronnie Ritchie M.D.        I ordered and independently reviewed the above noted radiographic studies.      I viewed images of chest x-ray which showed hazy left density unchanged from prior per my independent interpretation.    See radiologist's dictation for official interpretation.             PROCEDURES  Procedures          MEDICATIONS GIVEN IN ER  Medications - No data to display            MEDICAL DECISION MAKING, PROGRESS, and CONSULTS    All labs have been independently reviewed by me.  All radiology studies have been reviewed by me and I have also reviewed the radiology report.   EKG's independently viewed and interpreted by me.  Discussion below represents my analysis of pertinent findings related to patient's condition, differential diagnosis, treatment plan and final disposition.      Additional sources:  - Discussed/ obtained information from independent historians: EMS, nursing home notes,  after initial arrival    - External (non-ED) record review: Please see documented above    - Chronic or social conditions impacting care: Multiple chronic conditions including diabetes, rheumatoid arthritis and chronic renal disease    - Shared decision making: I discussed ED evaluation and treatment plan with patient who is in agreement.  Complicated patient here with hypoglycemia of unclear cause.  Concerning because there is leukocytosis and elevated procalcitonin without clear source of infection.  Glucose has been improved here in the ED after receiving glucagon at the  nursing home as well as D50 with EMS.  Patient has had normal glucose readings through the ED course and we have given her some oral food although she has had minimized appetite.      Orders placed during this visit:  Orders Placed This Encounter   Procedures   • XR Chest 1 View   • Comprehensive Metabolic Panel   • Procalcitonin   • CBC Auto Differential   • Urinalysis With Microscopic If Indicated (No Culture) - Urine, Catheter   • Urinalysis, Microscopic Only - Urine, Clean Catch   • [facility] (on-call MD unless specified) Details   • POC Glucose Once   • POC Glucose Once   • Initiate Observation Status   • CBC & Differential           Differential diagnosis:    Please see as documented below in ED course      Independent interpretation of labs, radiology studies, and discussions with consultants:  ED Course as of 03/04/23 1346   Sat Mar 04, 2023   0914 MDM-complicated 57-year-old female with multiple medical problems including end-stage renal disease, diabetes and obesity presents with hypoglycemia at nursing home.    On exam she is chronically ill-appearing but nontoxic in appearance.  Vitals unremarkable.    MDM-this is a pretty complicated patient with multiple comorbidities.  She did have pretty significant hypoglycemia earlier with a glucose of 40.  From review of nursing home records it looks like she was given 3 units of Lantus insulin but does not take oral hypoglycemic medications.  She has not eaten since last night and has had diminished appetite recently.  Given her multiple medical problems there could be many causes for her hypoglycemia.  She could have worsening renal failure, infection, poorly controlled diabetes.  Would be concerned for electrolyte disturbance, infection or dehydration.  We will go ahead and check some routine labs.  We will try to get patient some oral food although she does not have much appetite.  We will get her to drink some orange juice. [DB]   1101 Patient with worsening  leukocytosis and white count of 21.55 which could represent infection.  Leukocytosis is nonspecific.  Will add urinalysis, chest x-ray for further assessment. [DB]   1104 I went back to reassess the patient she is alert and doing well.  She has drank all of her orange juice but really still has not much of an appetite.  Vitals are benign.  I explained to patient and  that we were looking into the cause of her low sugar.  There is a possibility that this could be related to infection but the leukocytosis does not necessarily mean that there is an infection and she has a relatively benign exam.  I have added chest x-ray and urinalysis to look for possible sources of infection and will look for patient's procalcitonin to help decide if there is acute infection ongoing today. [DB]   1139 Repeat glucose is improved at 134. [DB]   1140 Chemistries are reviewed and notable for creatinine of 2.38 which is similar to 3 weeks ago when it was 2.84.  Sodium is chronically low and today 129 compared with 124 from 3 weeks ago.  This represents chronic hyponatremia and chronic renal failure. [DB]   1140 Patient's procalcitonin is elevated at 1.76 and while it is chronically elevated it is somewhat higher than before.  This certainly could represent acute infection.  We will check x-ray and urinalysis as of been ordered. [DB]   1141 Chest x-ray independently interpreted shows some cardiomegaly but no obvious pulmonary infiltrate. [DB]   1220 Chest x-ray interpretation by Dr. Ritchie shows no significant change from prior films with density in the left base which could represent atelectasis or some effusion [DB]   1303 I spoke at great length with patient and  at bedside.  She is a complicated patient and it is unclear that the source of her hypoglycemia.    Unfortunately she does have worsening leukocytosis and elevated procalcitonin which could suggest infection.  I do not see any clear source of infection at this  time.  She has been seen by ID recently for similar.  We will go ahead and admit on observation to further assess for fever or ongoing infection as well as for glucose monitoring. [DB]   9199 I discussed treatment and evaluation this patient with Dr. Quick will admit on behalf of [facility]. [DB]      ED Course User Index  [DB] Darien Cook MD             I used full protective equipment while examining this patient.  This includes face mask, gloves and protective eyewear.  I washed my hands before entering the room and immediately upon leaving the room    DIAGNOSIS  Final diagnoses:   Hypoglycemia due to type 2 diabetes mellitus (HCC)   Leukocytosis, unspecified type   Rheumatoid arthritis, involving unspecified site, unspecified whether rheumatoid factor present (HCC)   ESRD (end stage renal disease) ([facility])         DISPOSITION  Admission            Latest Documented Vital Signs:  As of 13:46 EST  BP- 151/84 HR- 75 Temp- 97.1 °F (36.2 °C) (Tympanic) O2 sat- 100%              --    Please note that portions of this were completed with a voice recognition program.       Note Disclaimer: At [facility], we believe that sharing information builds trust and better relationships. You are receiving this note because you are receiving care at [facility] or recently visited. It is possible you will see health information before a provider has talked with you about it. This kind of information can be easy to misunderstand. To help you fully understand what it means for your health, we urge you to discuss this note with your provider.           Darien Cook MD  03/04/23 [id number]

## 2023-03-04 NOTE — PLAN OF CARE
Goal Outcome Evaluation:  Plan of Care Reviewed With: patient, spouse        Progress: no change  Outcome Evaluation: admitted for low bg but accucheck wdl. c/o diarrhea in ed but very small amt mucousy stool smear noted on admit. pt's  reports pt has diarrhea and constipation and they give meds for both consistently. c/o R hip pain, repositioned. scds on. accumax on bed. skin intact but coccyx and hailey heels pink, blanchable. enc floating heels. pt anxious at times. needs enc to move.

## 2023-03-04 NOTE — ED NOTES
.Nursing report ED to floor  Zaina Martinez  57 y.o.  female    HPI :   Chief Complaint   Patient presents with    Hypoglycemia       Admitting doctor:   Debbie Quick MD    Admitting diagnosis:   The primary encounter diagnosis was Hypoglycemia due to type 2 diabetes mellitus (HCC). Diagnoses of Leukocytosis, unspecified type, Rheumatoid arthritis, involving unspecified site, unspecified whether rheumatoid factor present (HCC), and ESRD (end stage renal disease) (HCC) were also pertinent to this visit.    Code status:   Current Code Status       Date Active Code Status Order ID Comments User Context       Prior            Allergies:   Contrast dye (echo or unknown ct/mr), Ibuprofen, and Prochlorperazine    Isolation:   No active isolations    Intake and Output  No intake or output data in the 24 hours ending 03/04/23 1400    Weight:       03/04/23  0901   Weight: 67.2 kg (148 lb 2.4 oz)       Most recent vitals:   Vitals:    03/04/23 1001 03/04/23 1031 03/04/23 1101 03/04/23 1201   BP: 137/78 152/69 141/74 151/84   BP Location: Right arm Right arm Right arm Right arm   Patient Position: Lying Lying Lying Lying   Pulse: 73 72 74 75   Resp: 18 18 18 18   Temp:       TempSrc:       SpO2: 100% 100% 100% 100%   Weight:       Height:           Active LDAs/IV Access:   Lines, Drains & Airways       Active LDAs       Name Placement date Placement time Site Days    Peripheral IV 03/04/23 0840 Anterior;Right Hand 03/04/23  0840  Hand  less than 1    Hemodialysis Cath Double 05/03/22  0909  Internal Jugular  305                    Labs (abnormal labs have a star):   Labs Reviewed   COMPREHENSIVE METABOLIC PANEL - Abnormal; Notable for the following components:       Result Value    Glucose 143 (*)     Creatinine 2.38 (*)     Sodium 129 (*)     Chloride 91 (*)     CO2 30.2 (*)     Calcium 8.2 (*)     Albumin 2.7 (*)     Alkaline Phosphatase 161 (*)     eGFR 23.3 (*)     All other components within normal limits    Narrative:      "GFR Normal >60  Chronic Kidney Disease <60  Kidney Failure <15     PROCALCITONIN - Abnormal; Notable for the following components:    Procalcitonin 1.76 (*)     All other components within normal limits    Narrative:     As a Marker for Sepsis (Non-Neonates):    1. <0.5 ng/mL represents a low risk of severe sepsis and/or septic shock.  2. >2 ng/mL represents a high risk of severe sepsis and/or septic shock.    As a Marker for Lower Respiratory Tract Infections that require antibiotic therapy:    PCT on Admission    Antibiotic Therapy       6-12 Hrs later    >0.5                Strongly Recommended  >0.25 - <0.5        Recommended   0.1 - 0.25          Discouraged              Remeasure/reassess PCT  <0.1                Strongly Discouraged     Remeasure/reassess PCT    As 28 day mortality risk marker: \"Change in Procalcitonin Result\" (>80% or <=80%) if Day 0 (or Day 1) and Day 4 values are available. Refer to http://[email or web address]-pct-calculator.com    Change in PCT <=80%  A decrease of PCT levels below or equal to 80% defines a positive change in PCT test result representing a higher risk for 28-day all-cause mortality of patients diagnosed with severe sepsis for septic shock.    Change in PCT >80%  A decrease of PCT levels of more than 80% defines a negative change in PCT result representing a lower risk for 28-day all-cause mortality of patients diagnosed with severe sepsis or septic shock.      CBC WITH AUTO DIFFERENTIAL - Abnormal; Notable for the following components:    WBC 21.55 (*)     Hemoglobin 9.9 (*)     Hematocrit 31.8 (*)     MCH 25.2 (*)     MCHC 31.1 (*)     RDW 16.9 (*)     Neutrophil % 89.7 (*)     Lymphocyte % 5.2 (*)     Monocyte % 4.2 (*)     Eosinophil % 0.1 (*)     Immature Grans % 0.7 (*)     Neutrophils, Absolute 19.32 (*)     Monocytes, Absolute 0.91 (*)     Immature Grans, Absolute 0.15 (*)     All other components within normal limits   URINALYSIS W/ MICROSCOPIC IF INDICATED (NO CULTURE) - " Abnormal; Notable for the following components:    Color, UA Dark Yellow (*)     Appearance, UA Turbid (*)     Blood, UA Moderate (2+) (*)     Protein, UA >=300 mg/dL (3+) (*)     Leuk Esterase, UA Large (3+) (*)     All other components within normal limits   URINALYSIS, MICROSCOPIC ONLY - Abnormal; Notable for the following components:    RBC, UA 3-5 (*)     WBC, UA 13-20 (*)     All other components within normal limits   POCT GLUCOSE FINGERSTICK - Abnormal; Notable for the following components:    Glucose 134 (*)     All other components within normal limits   POCT GLUCOSE FINGERSTICK - Normal   CBC AND DIFFERENTIAL    Narrative:     The following orders were created for panel order CBC & Differential.  Procedure                               Abnormality         Status                     ---------                               -----------         ------                     CBC Auto Differential[[id number]]        Abnormal            Final result                 Please view results for these tests on the individual orders.       EKG:   No orders to display       Meds given in ED:   Medications - No data to display    Imaging results:  No radiology results for the last day    Ambulatory status:   - assist x1    Social issues:   Social History     Socioeconomic History    Marital status:      Spouse name: Marv   Tobacco Use    Smoking status: Never    Smokeless tobacco: Never   Vaping Use    Vaping Use: Never used   Substance and Sexual Activity    Alcohol use: Never    Drug use: Never    Sexual activity: Defer       NIH Stroke Scale:         Fide Smith RN  03/04/23 14:00 EST

## 2023-03-05 LAB
ALBUMIN SERPL-MCNC: 2.3 G/DL (ref 3.5–5.2)
ANION GAP SERPL CALCULATED.3IONS-SCNC: 8 MMOL/L (ref 5–15)
BASOPHILS # BLD AUTO: 0.03 10*3/MM3 (ref 0–0.2)
BASOPHILS NFR BLD AUTO: 0.2 % (ref 0–1.5)
BUN SERPL-MCNC: 23 MG/DL (ref 6–20)
BUN/CREAT SERPL: 7.5 (ref 7–25)
CALCIUM SPEC-SCNC: 7.9 MG/DL (ref 8.6–10.5)
CHLORIDE SERPL-SCNC: 94 MMOL/L (ref 98–107)
CO2 SERPL-SCNC: 31 MMOL/L (ref 22–29)
CREAT SERPL-MCNC: 3.07 MG/DL (ref 0.57–1)
DEPRECATED RDW RBC AUTO: 48 FL (ref 37–54)
EGFRCR SERPLBLD CKD-EPI 2021: 17.1 ML/MIN/1.73
EOSINOPHIL # BLD AUTO: 0 10*3/MM3 (ref 0–0.4)
EOSINOPHIL NFR BLD AUTO: 0 % (ref 0.3–6.2)
ERYTHROCYTE [DISTWIDTH] IN BLOOD BY AUTOMATED COUNT: 16.8 % (ref 12.3–15.4)
GLUCOSE BLDC GLUCOMTR-MCNC: 102 MG/DL (ref 70–130)
GLUCOSE BLDC GLUCOMTR-MCNC: 120 MG/DL (ref 70–130)
GLUCOSE BLDC GLUCOMTR-MCNC: 139 MG/DL (ref 70–130)
GLUCOSE BLDC GLUCOMTR-MCNC: 19 MG/DL (ref 70–130)
GLUCOSE BLDC GLUCOMTR-MCNC: 19 MG/DL (ref 70–130)
GLUCOSE BLDC GLUCOMTR-MCNC: 261 MG/DL (ref 70–130)
GLUCOSE BLDC GLUCOMTR-MCNC: 82 MG/DL (ref 70–130)
GLUCOSE SERPL-MCNC: 22 MG/DL (ref 65–99)
HCT VFR BLD AUTO: 24.7 % (ref 34–46.6)
HGB BLD-MCNC: 7.4 G/DL (ref 12–15.9)
IMM GRANULOCYTES # BLD AUTO: 0.07 10*3/MM3 (ref 0–0.05)
IMM GRANULOCYTES NFR BLD AUTO: 0.5 % (ref 0–0.5)
LYMPHOCYTES # BLD AUTO: 1.51 10*3/MM3 (ref 0.7–3.1)
LYMPHOCYTES NFR BLD AUTO: 10.5 % (ref 19.6–45.3)
MAGNESIUM SERPL-MCNC: 1.8 MG/DL (ref 1.6–2.6)
MCH RBC QN AUTO: 24.3 PG (ref 26.6–33)
MCHC RBC AUTO-ENTMCNC: 30 G/DL (ref 31.5–35.7)
MCV RBC AUTO: 81.3 FL (ref 79–97)
MONOCYTES # BLD AUTO: 1.14 10*3/MM3 (ref 0.1–0.9)
MONOCYTES NFR BLD AUTO: 7.9 % (ref 5–12)
NEUTROPHILS NFR BLD AUTO: 11.69 10*3/MM3 (ref 1.7–7)
NEUTROPHILS NFR BLD AUTO: 80.9 % (ref 42.7–76)
NRBC BLD AUTO-RTO: 0 /100 WBC (ref 0–0.2)
PHOSPHATE SERPL-MCNC: 2 MG/DL (ref 2.5–4.5)
PLATELET # BLD AUTO: 276 10*3/MM3 (ref 140–450)
PMV BLD AUTO: 9.4 FL (ref 6–12)
POTASSIUM SERPL-SCNC: 4 MMOL/L (ref 3.5–5.2)
RBC # BLD AUTO: 3.04 10*6/MM3 (ref 3.77–5.28)
SODIUM SERPL-SCNC: 133 MMOL/L (ref 136–145)
T4 FREE SERPL-MCNC: 0.85 NG/DL (ref 0.93–1.7)
TSH SERPL DL<=0.05 MIU/L-ACNC: 8.49 UIU/ML (ref 0.27–4.2)
WBC NRBC COR # BLD: 14.44 10*3/MM3 (ref 3.4–10.8)

## 2023-03-05 PROCEDURE — 82962 GLUCOSE BLOOD TEST: CPT

## 2023-03-05 PROCEDURE — 96375 TX/PRO/DX INJ NEW DRUG ADDON: CPT

## 2023-03-05 PROCEDURE — 87102 FUNGUS ISOLATION CULTURE: CPT | Performed by: STUDENT IN AN ORGANIZED HEALTH CARE EDUCATION/TRAINING PROGRAM

## 2023-03-05 PROCEDURE — 83735 ASSAY OF MAGNESIUM: CPT | Performed by: INTERNAL MEDICINE

## 2023-03-05 PROCEDURE — 80069 RENAL FUNCTION PANEL: CPT | Performed by: INTERNAL MEDICINE

## 2023-03-05 PROCEDURE — 84439 ASSAY OF FREE THYROXINE: CPT | Performed by: STUDENT IN AN ORGANIZED HEALTH CARE EDUCATION/TRAINING PROGRAM

## 2023-03-05 PROCEDURE — 85025 COMPLETE CBC W/AUTO DIFF WBC: CPT | Performed by: STUDENT IN AN ORGANIZED HEALTH CARE EDUCATION/TRAINING PROGRAM

## 2023-03-05 PROCEDURE — G0378 HOSPITAL OBSERVATION PER HR: HCPCS

## 2023-03-05 PROCEDURE — 87040 BLOOD CULTURE FOR BACTERIA: CPT | Performed by: STUDENT IN AN ORGANIZED HEALTH CARE EDUCATION/TRAINING PROGRAM

## 2023-03-05 PROCEDURE — 63710000001 INSULIN LISPRO (HUMAN) PER 5 UNITS: Performed by: INTERNAL MEDICINE

## 2023-03-05 RX ADMIN — LEVETIRACETAM 250 MG: 250 TABLET, FILM COATED ORAL at 21:59

## 2023-03-05 RX ADMIN — Medication 10 ML: at 21:59

## 2023-03-05 RX ADMIN — PANTOPRAZOLE SODIUM 40 MG: 40 TABLET, DELAYED RELEASE ORAL at 09:12

## 2023-03-05 RX ADMIN — HYDRALAZINE HYDROCHLORIDE 50 MG: 50 TABLET, FILM COATED ORAL at 22:00

## 2023-03-05 RX ADMIN — LISINOPRIL 20 MG: 20 TABLET ORAL at 21:59

## 2023-03-05 RX ADMIN — OLANZAPINE 5 MG: 5 TABLET ORAL at 21:59

## 2023-03-05 RX ADMIN — LEVOTHYROXINE SODIUM 300 MCG: 0.15 TABLET ORAL at 09:12

## 2023-03-05 RX ADMIN — CARVEDILOL 3.12 MG: 3.12 TABLET, FILM COATED ORAL at 09:12

## 2023-03-05 RX ADMIN — LORAZEPAM 0.5 MG: 0.5 TABLET ORAL at 18:48

## 2023-03-05 RX ADMIN — AMLODIPINE BESYLATE 10 MG: 10 TABLET ORAL at 09:12

## 2023-03-05 RX ADMIN — DESVENLAFAXINE SUCCINATE 25 MG: 25 TABLET, EXTENDED RELEASE ORAL at 09:12

## 2023-03-05 RX ADMIN — INSULIN LISPRO 6 UNITS: 100 INJECTION, SOLUTION INTRAVENOUS; SUBCUTANEOUS at 11:58

## 2023-03-05 RX ADMIN — LEVETIRACETAM 250 MG: 250 TABLET, FILM COATED ORAL at 09:12

## 2023-03-05 RX ADMIN — LORAZEPAM 0.5 MG: 0.5 TABLET ORAL at 11:58

## 2023-03-05 RX ADMIN — CARVEDILOL 3.12 MG: 3.12 TABLET, FILM COATED ORAL at 18:47

## 2023-03-05 RX ADMIN — OLANZAPINE 5 MG: 5 TABLET ORAL at 09:12

## 2023-03-05 RX ADMIN — FOLIC ACID 1 MG: 1 TABLET ORAL at 09:12

## 2023-03-05 RX ADMIN — ASPIRIN 81 MG: 81 TABLET, COATED ORAL at 09:12

## 2023-03-05 RX ADMIN — DEXTROSE MONOHYDRATE 25 G: 25 INJECTION, SOLUTION INTRAVENOUS at 06:30

## 2023-03-05 RX ADMIN — LACTULOSE 15 ML: 10 SOLUTION ORAL at 21:59

## 2023-03-05 RX ADMIN — LORAZEPAM 0.5 MG: 0.5 TABLET ORAL at 05:34

## 2023-03-05 RX ADMIN — ROPINIROLE 2 MG: 2 TABLET, FILM COATED ORAL at 21:59

## 2023-03-05 RX ADMIN — Medication 10 ML: at 09:14

## 2023-03-05 RX ADMIN — HYDRALAZINE HYDROCHLORIDE 50 MG: 50 TABLET, FILM COATED ORAL at 09:12

## 2023-03-05 NOTE — PLAN OF CARE
Problem: Adult Inpatient Plan of Care  Goal: Plan of Care Review  Outcome: Ongoing, Progressing  Flowsheets (Taken 3/5/2023 0621)  Progress: no change  Plan of Care Reviewed With: patient  Outcome Evaluation: drowsy and asleep most of the time, arouses to voice and goes right back to sleep, falls precaution maintained, repositioned, voided once small amount, blood sugar monitored, BS 19 this am, but Pt still talking and coherent, BS repeated with same results 19,  D50% 25 ml IV given, a cup of apple juice given and tolerated well,  after 15 min. for further care   Goal Outcome Evaluation:  Plan of Care Reviewed With: patient        Progress: no change  Outcome Evaluation: drowsy and asleep most of the time, arouses to voice and goes right back to sleep, falls precaution maintained, repositioned, voided once small amount, blood sugar monitored, BS 19 this am, but Pt still talking and coherent, BS repeated with same results 19,  D50% 25 ml IV given, a cup of apple juice given and tolerated well,  after 15 min. for further care

## 2023-03-05 NOTE — PLAN OF CARE
Goal Outcome Evaluation:  Plan of Care Reviewed With: patient        Progress: no change  Outcome Evaluation: drowsy occasionally, but blood glucose stable after low early AM. pt ate well for breakfast but minimal for lunch or dinner. turning and protectors on heels. coccyx and heels pink blanchable. scds on. accumax on bed. up in chair but max asst and pt very minimal effort to stand or pivot. bed alarm on but no attempt to get oob. L eye swollen and  states that happens when she needs dialysis. dialysis cath dsg cdi L chest

## 2023-03-05 NOTE — PROGRESS NOTES
Name: Zaina Martinez ADMIT: 3/4/2023   : 1965  PCP: Norman Serrato MD    MRN: [id number] LOS: 0 days   AGE/SEX: 57 y.o. female  ROOM: [facility]     Subjective   Subjective   Patient laying in bed and ate most of her breakfast morning.  Patient was asymptomatic with her glucose of 20.  States that she was woken up and was told her sugar was low.  Otherwise no new complaints.  No fevers or chills, no dysuria or frequency.  No cough.    Review of Systems   Constitutional: Negative for chills and fever.   Respiratory: Negative for cough and shortness of breath.    Cardiovascular: Negative for chest pain and leg swelling.   Gastrointestinal: Negative for abdominal pain, diarrhea and nausea.     As above     Objective   Objective   Vital Signs  Temp:  [95.8 °F (35.4 °C)-98.9 °F (37.2 °C)] 96.8 °F (36 °C)  Heart Rate:  [74-88] 86  Resp:  [14-18] 16  BP: (128-151)/(68-84) 128/84  SpO2:  [95 %-100 %] 95 %  on   ;   Device (Oxygen Therapy): room air  Body mass index is 23.71 kg/m².  Physical Exam  Constitutional:       General: She is not in acute distress.     Comments: Chronically ill-appearing   HENT:      Head: Normocephalic and atraumatic.      Mouth/Throat:      Mouth: Mucous membranes are moist.      Pharynx: Oropharynx is clear.   Cardiovascular:      Rate and Rhythm: Normal rate and regular rhythm.      Heart sounds:     No gallop.   Pulmonary:      Effort: Pulmonary effort is normal. No respiratory distress.   Abdominal:      General: Abdomen is flat. Bowel sounds are normal. There is no distension.      Palpations: Abdomen is soft.      Tenderness: There is no abdominal tenderness.   Musculoskeletal:         General: No swelling or deformity. Normal range of motion.   Skin:     General: Skin is warm and dry.   Neurological:      General: No focal deficit present.      Mental Status: She is alert and oriented to person, place, and time.         Results Review     I reviewed the patient's new clinical  results.  Results from last 7 days   Lab Units 03/05/23 0527 03/04/23  0931   WBC 10*3/mm3 14.44* 21.55*   HEMOGLOBIN g/dL 7.4* 9.9*   PLATELETS 10*3/mm3 276 293     Results from last 7 days   Lab Units 03/05/23  0527 03/04/23  1047   SODIUM mmol/L 133* 129*   POTASSIUM mmol/L 4.0 4.2   CHLORIDE mmol/L 94* 91*   CO2 mmol/L 31.0* 30.2*   BUN mg/dL 23* 19   CREATININE mg/dL 3.07* 2.38*   GLUCOSE mg/dL 22* 143*   Estimated Creatinine Clearance: 18.8 mL/min (A) (by C-G formula based on SCr of 3.07 mg/dL (H)).  Results from last 7 days   Lab Units 03/05/23 0527 03/04/23  1047   ALBUMIN g/dL 2.3* 2.7*   BILIRUBIN mg/dL  --  0.5   ALK PHOS U/L  --  161*   AST (SGOT) U/L  --  25   ALT (SGPT) U/L  --  5     Results from last 7 days   Lab Units 03/05/23 0527 03/04/23  1047   CALCIUM mg/dL 7.9* 8.2*   ALBUMIN g/dL 2.3* 2.7*   MAGNESIUM mg/dL 1.8  --    PHOSPHORUS mg/dL 2.0*  --      Results from last 7 days   Lab Units 03/04/23  1047   PROCALCITONIN ng/mL 1.76*     COVID19   Date Value Ref Range Status   02/05/2023 Not Detected Not Detected - Ref. Range Final   02/01/2023 Not Detected Not Detected - Ref. Range Final     Glucose   Date/Time Value Ref Range Status   03/05/2023 0859 139 (H) 70 - 130 mg/dL Final     Comment:     Meter: [id number] : [id number] Jessica Bello NA   03/05/2023 0645 120 70 - 130 mg/dL Final     Comment:     Meter: [id number] : [id number] Azael Conchita M NA   03/05/2023 0630 19 (C) 70 - 130 mg/dL Final     Comment:     RN Notified R and V Meter: [id number] : [id number] Azael MAJOR NA   03/05/2023 0627 19 (C) 70 - 130 mg/dL Final     Comment:     Repeat Test Meter: [id number] : [id number] Azael MAJOR NA   03/04/2023 2122 151 (H) 70 - 130 mg/dL Final     Comment:     Meter: [id number] : [id number] [place] Conchita MAJOR NA   03/04/2023 1616 100 70 - 130 mg/dL Final     Comment:     Meter: [id number] : [id number] Jessica Bello NA   03/04/2023 1409 112 70 - 130 mg/dL Final      Comment:     Meter: [id number] : [id number] Meme Carvalho EDT       XR Chest 1 View  ONE VIEW PORTABLE CHEST AT 11:25 AM     HISTORY: Hypoglycemia. Leukocytosis. Chronic renal failure.     FINDINGS: The lungs are well-expanded with persistent increased density  in the lower left chest that obscures the diaphragm and consistent with  moderately large left pleural effusion and adjacent dense atelectasis as  also noted on the chest CT scan dated 02/08/2023. This is also similar  to the chest x-ray dated 02/05/2023. The right lung remains clear. The  heart remains enlarged. A large gauge right-sided vascular catheter ends  in the right atrium without change.     This report was finalized on 3/4/2023 11:51 AM by Dr. Ronnie Ritchie M.D.       I reviewed the patient's daily medications.  Scheduled Medications  amLODIPine, 10 mg, Oral, Q24H  aspirin, 81 mg, Oral, Daily  carvedilol, 3.125 mg, Oral, BID With Meals  Desvenlafaxine Succinate ER, 25 mg, Oral, Daily  folic acid, 1 mg, Oral, Daily  hydrALAZINE, 50 mg, Oral, Q12H  insulin lispro, 0-9 Units, Subcutaneous, TID AC  lactulose, 15 mL, Oral, Q12H  levETIRAcetam, 250 mg, Oral, BID  levothyroxine, 300 mcg, Oral, Daily  lisinopril, 20 mg, Oral, Nightly  LORazepam, 0.5 mg, Oral, Q6H  OLANZapine, 5 mg, Oral, BID  pantoprazole, 40 mg, Oral, Daily  rOPINIRole, 2 mg, Oral, Nightly  sertraline, 50 mg, Oral, Daily  sodium chloride, 10 mL, Intravenous, Q12H    Infusions   Diet  Diet: Cardiac Diets, Diabetic Diets, Renal Diets; Healthy Heart (2-3 Na+); Consistent Carbohydrate; Low Sodium (2-3g), Low Potassium, Low Phosphorus; Texture: Regular Texture (IDDSI 7); Fluid Consistency: Thin (IDDSI 0)         I have personally reviewed:  [x]  Laboratory   [x]  Microbiology   [x]  Radiology   [x]  EKG/Telemetry   []  Cardiology/Vascular   []  Pathology   [x]  Records     Assessment/Plan     Active Hospital Problems    Diagnosis  POA   • **Hypoglycemia due to type 2 diabetes mellitus  ([facility]) [E11.649]  Yes   • Ureteral stent present [Z96.0]  Not Applicable   • Pleural effusion [J90]  Yes   • History of stroke- R MCA s/p TPA with subsequent ICH with debility [Z86.73]  Not Applicable   • Hyponatremia [E87.1]  Yes   • ESRD (end stage renal disease) ([facility]) [N18.6]  Yes   • Abnormal urinalysis [R82.90]  Yes   • Hypothyroidism [E03.9]  Yes   • Rheumatoid arthritis ([facility]) [M06.9]  Yes   • Type 2 diabetes mellitus with kidney complication, with long-term current use of insulin ([facility]) [E11.29, Z79.4]  Not Applicable   • Leukocytosis [D72.829]  Yes   • Anemia [D64.9]  Yes      Resolved Hospital Problems   No resolved problems to display.       57 y.o. female admitted with Hypoglycemia due to type 2 diabetes mellitus ([facility]).    Hypoglycemia  Diabetes type 2  -19 this morning, will discontinue long-acting insulin  -Patient has asymptomatic hypoglycemia so hypoglycemia for her is especially dangerous  -Continue sliding scale insulin only.  MIB too dangerous for her to be on long-acting insulin as her oral intake is not consistent and glucoses in the 20s or not compatible with life    Leukocytosis  -21 yesterday down to 14 today without any interventions  -Likely secondary to stress response to her hypoglycemia  -Abnormal UA without any symptoms  -Chest x-ray with moderately large left pleural effusion  -Blood cultures pending  -Procalcitonin mildly elevated,    Abnormal UA  -History of multiple abnormal UAs  -Patient without any signs or symptoms of urinary tract infection    Paroxysmal atrial fibrillation-continue home Coreg and apixaban    ESRD- renal consulted, appreciate recommendations    Hypothyroidism-continue home Synthroid    Seizure disorder-continue home Keppra, renally dressed    Hypertension-continue home Coreg, amlodipine, hydralazine, lisinopril    RLS - continue home requip    Depression - continue home zoloft and desvenlafaxine    · SCDs for DVT prophylaxis.  · Full code.  · Discussed with patient  and nursing staff.  · Anticipate discharge Nursing home in 2-3 days.      Eddie Givens MD  [facility]  03/05/23  10:59 EST

## 2023-03-05 NOTE — CONSULTS
[facility] Consult Note      Patient Name: Zaina Martinez  : 1965  MRN: [id number]  Primary Care Physician:  Norman Serrato MD  Referring Physician: Debbie Quick MD  Date of admission: 3/4/2023    Subjective     Reason for Consult:  ESRD    HPI:   Zaina Martinez is a 57 y.o. female with ESRD on HD (MWF; right chest catheter), admitted today from nursing home for further evaluation of hypoglycemia.  She is a very poor historian, and she is quite groggy; she reports reason for admission is severe leg pain, though she cannot tell me which leg hurts.  Full PMH outlined below.  Was just discharged from the hospital  following lengthy admission for weakness, pyuria, and failure to thrive.  She believes last HD was 3/3 (yesterday).  She is not aware of any recent fever or chills.  Breathing is comfortable on room air as she lies flat.    Review of Systems:   Not reliably obtainable from the patient.  For what is worth, she reports severe leg pain; appetite is mediocre; she is no nausea or vomiting, though.  Denies diarrhea.      Personal History     Past Medical History:   Diagnosis Date   • Acute CVA (cerebrovascular accident) (HCC) 2022   • Acute on chronic diastolic CHF (congestive heart failure) (HCC)    • Anemia    • CAD (coronary artery disease) 2021   • Diabetes (HCC)    • Disease of thyroid gland    • GERD (gastroesophageal reflux disease)    • History of intracranial hemorrhage 2022   • Hyperlipidemia 2018   • Hypertension    • Rheumatoid arthritis (HCC)    • Stage 5 chronic kidney disease (HCC)        Past Surgical History:   Procedure Laterality Date   • CHOLECYSTECTOMY WITH INTRAOPERATIVE CHOLANGIOGRAM N/A 1/10/2022    Procedure: Laparoscopic cholecystectomy with intraoperative cholangiogram;  Surgeon: Ramana Raygoza MD;  Location: [facility];  Service: General;  Laterality: N/A;   • CYSTOSCOPY W/ URETERAL STENT PLACEMENT Left 2022     Procedure: CYSTOSCOPY URETERAL STENT INSERTION WITH RETROGRADE PYELOGRAM;  Surgeon: Bryant Phan Jr., MD;  Location: [facility];  Service: Urology;  Laterality: Left;   • CYSTOSCOPY W/ URETERAL STENT PLACEMENT Left 1/10/2023    Procedure: CYSTOSCOPY LEFT STENT REMOVAL;  Surgeon: Bryant Phan Jr., MD;  Location: [facility];  Service: Urology;  Laterality: Left;   • EMBOLIZATION MESENTERIC ARTERY N/A 1/1/2023    Procedure: LEFT KIDNEY EMBOLIZATION;  Surgeon: Bijan Ordoñez MD;  Location: [facility] OR 18/19;  Service: Interventional Radiology;  Laterality: N/A;   • EYE SURGERY     • FEMUR IM NAILING/RODDING Right 11/10/2022    Procedure: FEMUR INTRAMEDULLARY NAILING/RODDING;  Surgeon: Glen Pierce MD;  Location: [facility];  Service: Orthopedics;  Laterality: Right;   • HIP INTERTROCHANTERIC NAILING Right 11/10/2022    Procedure: HIP INTERTROCHANTERIC NAILING;  Surgeon: Glen Pierce MD;  Location: [facility];  Service: Orthopedics;  Laterality: Right;   • HYSTERECTOMY     • INSERT CENTRAL LINE AT BEDSIDE  12/31/2022        • INSERTION HEMODIALYSIS CATHETER N/A 12/6/2021    Procedure: HEMODIALYSIS CATHETER INSERTION;  Surgeon: Keli Salazar MD;  Location: [facility] 18/19;  Service: Vascular;  Laterality: N/A;   • INSERTION HEMODIALYSIS CATHETER N/A 5/3/2022    Procedure: TUNNELED CATHETER PLACEMENT;  Surgeon: Keli Salazar MD;  Location: [facility];  Service: Vascular;  Laterality: N/A;   • MUSCLE BIOPSY Left 6/15/2022    Procedure: Left quadriceps muscle biopsy;  Surgeon: Marques Ma MD;  Location: [facility];  Service: Neurosurgery;  Laterality: Left;   • URETEROSCOPY LASER LITHOTRIPSY WITH STENT INSERTION Left 12/30/2022    Procedure: CYSTOSCOPY WITH LEFT  URETEROSCOPY  LEFT STENT EXCHANGE;  Surgeon: Bryant Phan Jr., MD;  Location: [facility];  Service: Urology;  Laterality: Left;       Family History: family  history is not on file.    Social History:  reports that she has never smoked. She has never used smokeless tobacco. She reports that she does not drink alcohol and does not use drugs.    Home Medications:  Prior to Admission medications    Medication Sig Start Date End Date Taking? Authorizing Provider   acetaminophen (TYLENOL) 325 MG tablet Take 2 tablets by mouth Every 6 (Six) Hours As Needed for Mild Pain, Fever or Headache. 2/11/23  Yes Mat Marte MD   amLODIPine (NORVASC) 10 MG tablet Take 1 tablet by mouth Daily. 12/9/22  Yes Andria Silva APRN   apixaban (ELIQUIS) 2.5 MG tablet tablet Take 1 tablet by mouth Every 12 (Twelve) Hours. 11/24/22  Yes ProviderDoyle MD   carvedilol (COREG) 3.125 MG tablet Take 1 tablet by mouth 2 (Two) Times a Day With Meals. 1/29/23  Yes Mat Marte MD   Desvenlafaxine Succinate ER 25 MG tablet sustained-release 24 hour Take 1 tablet by mouth Daily. 2/11/23  Yes Mat Marte MD   folic acid (FOLVITE) 1 MG tablet Take 1 tablet by mouth Daily.   Yes ProviderDoyle MD   glucagon (GLUCAGEN) 1 MG injection Inject 1 mg into the appropriate muscle as directed by prescriber. 11/4/22  Yes Doyle Murdock MD   HYDROcodone-acetaminophen (NORCO) 5-325 MG per tablet Take 1 tablet by mouth Every 6 (Six) Hours As Needed.   Yes Doyle Murdock MD   insulin glargine (LANTUS, SEMGLEE) 100 UNIT/ML injection Inject 3 Units under the skin into the appropriate area as directed Every Night. 12/9/22  Yes Andria Silva APRN   insulin lispro (ADMELOG) 100 UNIT/ML injection Inject 0-7 Units under the skin into the appropriate area as directed 3 (Three) Times a Day Before Meals. 11/24/22  Yes Mat Marte MD   lactulose (CHRONULAC) 10 GM/15ML solution Take 15 mL by mouth Every 12 (Twelve) Hours. 11/24/22  Yes Doyle Murdock MD   levETIRAcetam (Keppra) 250 MG tablet Take 1 tablet by mouth 2 (Two) Times a Day. 1/29/23  Yes Rosanna  Mat BOWERS MD   levothyroxine (Synthroid) 150 MCG tablet Take 2 tablets by mouth Daily. 1/29/23  Yes Mat Marte MD   lidocaine (LIDODERM) 5 % Place 1 patch on the skin as directed by provider Daily. Remove & Discard patch within 12 hours or as directed by MD 2/12/23  Yes Mat Marte MD   lisinopril (PRINIVIL,ZESTRIL) 20 MG tablet Take 1 tablet by mouth Every Night. 2/11/23  Yes Mat Marte MD   LORazepam (ATIVAN) 0.5 MG tablet Take 1 tablet by mouth Every 6 (Six) Hours As Needed for Anxiety. 2/11/23  Yes Mat Marte MD   LORazepam (ATIVAN) 0.5 MG tablet Take 1 tablet by mouth Every 6 (Six) Hours. 2/14/23  Yes Doyle Murdock MD   melatonin 3 MG tablet Take 1 tablet by mouth At Night As Needed for Sleep. 2/11/23  Yes Mat Marte MD   Methoxy PEG-Epoetin Beta (MIRCERA IJ) 100 mcg Every 14 (Fourteen) Days. 12/21/22 12/20/23 Yes Doyle Murdock MD   ondansetron ODT (ZOFRAN-ODT) 4 MG disintegrating tablet Place 1 tablet on the tongue Every 8 (Eight) Hours As Needed for Nausea or Vomiting.   Yes ProviderDoyle MD   pantoprazole (PROTONIX) 40 MG EC tablet Take 1 tablet by mouth Daily. 7/1/22  Yes Adonis Florentino MD   rOPINIRole (REQUIP) 2 MG tablet Take 1 tablet by mouth Every Night. 2/11/23  Yes Mat Marte MD   sennosides-docusate (PERICOLACE) 8.6-50 MG per tablet Take 2 tablets by mouth At Night As Needed for Constipation. 2/11/23  Yes Mat Marte MD   sertraline (ZOLOFT) 50 MG tablet Take 1 tablet by mouth Daily.   Yes ProviderDoyle MD   epoetin brooke-epbx (Retacrit) 04951 UNIT/ML injection Retacrit 10,000 unit/mL 9/28/22 3/4/23 Yes Doyle Murdock MD   lactulose (CHRONULAC) 10 GM/15ML solution Take 15 mL by mouth 2 (Two) Times a Day. 11/24/22 3/4/23 Yes Mat Marte MD   Melatonin 3 MG capsule Take 1 capsule by mouth. 2/14/23 3/4/23 Yes Provider, MD Doyle   aspirin 81 MG EC tablet Take 1 tablet by mouth Daily. 1/30/23    Mat Marte MD   hydrALAZINE (APRESOLINE) 50 MG tablet 1 tablet 2 (Two) Times a Day. 1/29/23   Doyle Murdock MD   levothyroxine (SYNTHROID, LEVOTHROID) 175 MCG tablet Take 1 tablet by mouth Every Morning. 11/29/22   Doyle Murdock MD   OLANZapine (zyPREXA) 5 MG tablet olanzapine 5 mg tablet   TAKE 1 TABLET BY MOUTH TWICE DAILY    Doyle Murdock MD   metoprolol tartrate (LOPRESSOR) 25 MG tablet metoprolol tartrate 25 mg tablet   TAKE 1 TABLET BY MOUTH EVERY 12 HOURS  3/4/23  Doyle Murdock MD       Allergies:  Allergies   Allergen Reactions   • Contrast Dye (Echo Or Unknown Ct/Mr) Confusion   • Iodinated Contrast Media Unknown - High Severity   • Ibuprofen Other (See Comments)     Kidney disease   • Prochlorperazine Other (See Comments)     Dystonic reaction            Objective     Vitals:   Temp:  [95.8 °F (35.4 °C)-98.9 °F (37.2 °C)] 98.9 °F (37.2 °C)  Heart Rate:  [72-88] 88  Resp:  [14-18] 16  BP: (136-153)/(69-84) 146/78    Intake/Output Summary (Last 24 hours) at 3/4/2023 2343  Last data filed at 3/4/2023 1819  Gross per 24 hour   Intake 120 ml   Output --   Net 120 ml       Physical Exam:   Constitutional: Groggy, confused, frail, chronically ill, moaning periodically  HEENT: Sclera anicteric, no conjunctival injection, dry MM  Neck: Supple, no carotid bruit, trachea at midline, no JVD  Respiratory: Coarse with decreased BS in bases, L>R, nonlabored Cardiovascular: RRR, no rub, 2/6M  Vascular: Right chest TDC  Gastrointestinal: BS +, soft, no grimacing with palpation, and nondistended  : No palpable bladder  Musculoskeletal: No significant edema; + clubbing  Psychiatric: Moaning, uncomfortable, not oriented  Neurologic: moving all extremities, minimal speech  Skin: Warm and dry       Scheduled Meds:     amLODIPine, 10 mg, Oral, Q24H  aspirin, 81 mg, Oral, Daily  carvedilol, 3.125 mg, Oral, BID With Meals  Desvenlafaxine Succinate ER, 25 mg, Oral, Daily  folic acid, 1 mg,  Oral, Daily  hydrALAZINE, 50 mg, Oral, Q12H  insulin glargine, 3 Units, Subcutaneous, Nightly  insulin lispro, 0-9 Units, Subcutaneous, TID AC  lactulose, 15 mL, Oral, Q12H  levETIRAcetam, 250 mg, Oral, BID  levothyroxine, 300 mcg, Oral, Daily  lisinopril, 20 mg, Oral, Nightly  LORazepam, 0.5 mg, Oral, Q6H  OLANZapine, 5 mg, Oral, BID  pantoprazole, 40 mg, Oral, Daily  rOPINIRole, 2 mg, Oral, Nightly  sertraline, 50 mg, Oral, Daily  sodium chloride, 10 mL, Intravenous, Q12H      IV Meds:        Results Reviewed:   I have personally reviewed the results from the time of this admission to 3/4/2023 23:43 EST     Lab Results   Component Value Date    GLUCOSE 143 (H) 03/04/2023    CALCIUM 8.2 (L) 03/04/2023     (L) 03/04/2023    K 4.2 03/04/2023    CO2 30.2 (H) 03/04/2023    CL 91 (L) 03/04/2023    BUN 19 03/04/2023    CREATININE 2.38 (H) 03/04/2023    EGFRIFAFRI  01/13/2022      Comment:      <15 Indicative of kidney failure.    EGFRIFNONA 14 (L) 02/28/2022    BCR 8.0 03/04/2023    ANIONGAP 7.8 03/04/2023      Lab Results   Component Value Date    MG 1.6 02/11/2023    PHOS 1.5 (C) 02/11/2023    ALBUMIN 2.7 (L) 03/04/2023           Assessment / Plan     ASSESSMENT:  1.  ESRD: hyponatremia with ESRD and chronic lung disease; potassium normal.  Last HD reportedly yesterday  2.  IDDM with hypoglycemia  3.  Failure to thrive, with frequent admissions to hospital  4.  PVD with prior stroke  5.  Severe depression  6.  Chronic CHF  7.  Chronic pain syndrome  8.  Atrial fibrillation and flutter on AC  9.  Severe hypothyroidism in the past due to noncompliance    PLAN:  1.  TSH  2.  HD MWF  3.  Surveillance labs    Thank you for involving us in the care of Zaina RAJ Littley.  Please feel free to call with any questions.    Alejo Lange MD  03/04/23  23:43 EST    [facility]  [phone]      Please note that portions of this note were completed with a voice recognition program.

## 2023-03-06 LAB
ALBUMIN SERPL-MCNC: 2.2 G/DL (ref 3.5–5.2)
ANION GAP SERPL CALCULATED.3IONS-SCNC: 12.7 MMOL/L (ref 5–15)
BASOPHILS # BLD AUTO: 0.05 10*3/MM3 (ref 0–0.2)
BASOPHILS NFR BLD AUTO: 0.4 % (ref 0–1.5)
BUN SERPL-MCNC: 29 MG/DL (ref 6–20)
BUN/CREAT SERPL: 8.3 (ref 7–25)
CALCIUM SPEC-SCNC: 7.9 MG/DL (ref 8.6–10.5)
CHLORIDE SERPL-SCNC: 94 MMOL/L (ref 98–107)
CO2 SERPL-SCNC: 25.3 MMOL/L (ref 22–29)
CREAT SERPL-MCNC: 3.5 MG/DL (ref 0.57–1)
DEPRECATED RDW RBC AUTO: 51.1 FL (ref 37–54)
EGFRCR SERPLBLD CKD-EPI 2021: 14.6 ML/MIN/1.73
EOSINOPHIL # BLD AUTO: 0.01 10*3/MM3 (ref 0–0.4)
EOSINOPHIL NFR BLD AUTO: 0.1 % (ref 0.3–6.2)
ERYTHROCYTE [DISTWIDTH] IN BLOOD BY AUTOMATED COUNT: 17 % (ref 12.3–15.4)
GLUCOSE BLDC GLUCOMTR-MCNC: 140 MG/DL (ref 70–130)
GLUCOSE BLDC GLUCOMTR-MCNC: 149 MG/DL (ref 70–130)
GLUCOSE BLDC GLUCOMTR-MCNC: 159 MG/DL (ref 70–130)
GLUCOSE BLDC GLUCOMTR-MCNC: 171 MG/DL (ref 70–130)
GLUCOSE BLDC GLUCOMTR-MCNC: 202 MG/DL (ref 70–130)
GLUCOSE BLDC GLUCOMTR-MCNC: 38 MG/DL (ref 70–130)
GLUCOSE BLDC GLUCOMTR-MCNC: 71 MG/DL (ref 70–130)
GLUCOSE SERPL-MCNC: 143 MG/DL (ref 65–99)
HAV IGM SERPL QL IA: NORMAL
HBV CORE IGM SERPL QL IA: NORMAL
HBV SURFACE AG SERPL QL IA: NORMAL
HCT VFR BLD AUTO: 22.7 % (ref 34–46.6)
HCV AB SER DONR QL: NORMAL
HGB BLD-MCNC: 7 G/DL (ref 12–15.9)
IMM GRANULOCYTES # BLD AUTO: 0.08 10*3/MM3 (ref 0–0.05)
IMM GRANULOCYTES NFR BLD AUTO: 0.6 % (ref 0–0.5)
LYMPHOCYTES # BLD AUTO: 1.5 10*3/MM3 (ref 0.7–3.1)
LYMPHOCYTES NFR BLD AUTO: 10.8 % (ref 19.6–45.3)
MAGNESIUM SERPL-MCNC: 1.6 MG/DL (ref 1.6–2.6)
MCH RBC QN AUTO: 25.5 PG (ref 26.6–33)
MCHC RBC AUTO-ENTMCNC: 30.8 G/DL (ref 31.5–35.7)
MCV RBC AUTO: 82.5 FL (ref 79–97)
MONOCYTES # BLD AUTO: 0.87 10*3/MM3 (ref 0.1–0.9)
MONOCYTES NFR BLD AUTO: 6.2 % (ref 5–12)
NEUTROPHILS NFR BLD AUTO: 11.42 10*3/MM3 (ref 1.7–7)
NEUTROPHILS NFR BLD AUTO: 81.9 % (ref 42.7–76)
NRBC BLD AUTO-RTO: 0 /100 WBC (ref 0–0.2)
PHOSPHATE SERPL-MCNC: 6.2 MG/DL (ref 2.5–4.5)
PLATELET # BLD AUTO: 238 10*3/MM3 (ref 140–450)
PMV BLD AUTO: 9.1 FL (ref 6–12)
POTASSIUM SERPL-SCNC: 4.7 MMOL/L (ref 3.5–5.2)
RBC # BLD AUTO: 2.75 10*6/MM3 (ref 3.77–5.28)
SODIUM SERPL-SCNC: 132 MMOL/L (ref 136–145)
WBC NRBC COR # BLD: 13.93 10*3/MM3 (ref 3.4–10.8)

## 2023-03-06 PROCEDURE — 80069 RENAL FUNCTION PANEL: CPT | Performed by: INTERNAL MEDICINE

## 2023-03-06 PROCEDURE — 83735 ASSAY OF MAGNESIUM: CPT | Performed by: INTERNAL MEDICINE

## 2023-03-06 PROCEDURE — 97530 THERAPEUTIC ACTIVITIES: CPT

## 2023-03-06 PROCEDURE — 96372 THER/PROPH/DIAG INJ SC/IM: CPT

## 2023-03-06 PROCEDURE — G0378 HOSPITAL OBSERVATION PER HR: HCPCS

## 2023-03-06 PROCEDURE — 96376 TX/PRO/DX INJ SAME DRUG ADON: CPT

## 2023-03-06 PROCEDURE — 97162 PT EVAL MOD COMPLEX 30 MIN: CPT

## 2023-03-06 PROCEDURE — 85025 COMPLETE CBC W/AUTO DIFF WBC: CPT | Performed by: STUDENT IN AN ORGANIZED HEALTH CARE EDUCATION/TRAINING PROGRAM

## 2023-03-06 PROCEDURE — 82962 GLUCOSE BLOOD TEST: CPT

## 2023-03-06 PROCEDURE — 63710000001 INSULIN LISPRO (HUMAN) PER 5 UNITS: Performed by: INTERNAL MEDICINE

## 2023-03-06 PROCEDURE — 80074 ACUTE HEPATITIS PANEL: CPT | Performed by: INTERNAL MEDICINE

## 2023-03-06 PROCEDURE — 25010000002 HEPARIN (PORCINE) PER 1000 UNITS: Performed by: STUDENT IN AN ORGANIZED HEALTH CARE EDUCATION/TRAINING PROGRAM

## 2023-03-06 PROCEDURE — G0257 UNSCHED DIALYSIS ESRD PT HOS: HCPCS

## 2023-03-06 RX ORDER — HYDRALAZINE HYDROCHLORIDE 50 MG/1
50 TABLET, FILM COATED ORAL 3 TIMES DAILY
Status: DISCONTINUED | OUTPATIENT
Start: 2023-03-07 | End: 2023-03-11 | Stop reason: HOSPADM

## 2023-03-06 RX ORDER — HEPARIN SODIUM 5000 [USP'U]/ML
5000 INJECTION, SOLUTION INTRAVENOUS; SUBCUTANEOUS EVERY 8 HOURS SCHEDULED
Status: DISCONTINUED | OUTPATIENT
Start: 2023-03-06 | End: 2023-03-07

## 2023-03-06 RX ADMIN — OLANZAPINE 5 MG: 5 TABLET ORAL at 21:45

## 2023-03-06 RX ADMIN — Medication 10 ML: at 21:47

## 2023-03-06 RX ADMIN — HEPARIN SODIUM 5000 UNITS: 5000 INJECTION INTRAVENOUS; SUBCUTANEOUS at 14:08

## 2023-03-06 RX ADMIN — DESVENLAFAXINE SUCCINATE 25 MG: 25 TABLET, EXTENDED RELEASE ORAL at 09:24

## 2023-03-06 RX ADMIN — INSULIN LISPRO 4 UNITS: 100 INJECTION, SOLUTION INTRAVENOUS; SUBCUTANEOUS at 11:50

## 2023-03-06 RX ADMIN — LISINOPRIL 20 MG: 20 TABLET ORAL at 21:45

## 2023-03-06 RX ADMIN — ASPIRIN 81 MG: 81 TABLET, COATED ORAL at 09:24

## 2023-03-06 RX ADMIN — Medication 10 ML: at 09:27

## 2023-03-06 RX ADMIN — CARVEDILOL 3.12 MG: 3.12 TABLET, FILM COATED ORAL at 09:24

## 2023-03-06 RX ADMIN — PANTOPRAZOLE SODIUM 40 MG: 40 TABLET, DELAYED RELEASE ORAL at 06:57

## 2023-03-06 RX ADMIN — HYDRALAZINE HYDROCHLORIDE 50 MG: 50 TABLET, FILM COATED ORAL at 09:24

## 2023-03-06 RX ADMIN — LORAZEPAM 0.5 MG: 0.5 TABLET ORAL at 13:28

## 2023-03-06 RX ADMIN — LEVOTHYROXINE SODIUM 300 MCG: 0.15 TABLET ORAL at 06:57

## 2023-03-06 RX ADMIN — FOLIC ACID 1 MG: 1 TABLET ORAL at 09:24

## 2023-03-06 RX ADMIN — HEPARIN SODIUM 5000 UNITS: 5000 INJECTION INTRAVENOUS; SUBCUTANEOUS at 22:16

## 2023-03-06 RX ADMIN — CARVEDILOL 3.12 MG: 3.12 TABLET, FILM COATED ORAL at 18:26

## 2023-03-06 RX ADMIN — LORAZEPAM 0.5 MG: 0.5 TABLET ORAL at 21:45

## 2023-03-06 RX ADMIN — AMLODIPINE BESYLATE 10 MG: 10 TABLET ORAL at 09:24

## 2023-03-06 RX ADMIN — LEVETIRACETAM 250 MG: 250 TABLET, FILM COATED ORAL at 09:24

## 2023-03-06 RX ADMIN — SODIUM PHOSPHATE, MONOBASIC, MONOHYDRATE AND SODIUM PHOSPHATE, DIBASIC, ANHYDROUS 10 MMOL: 276; 142 INJECTION, SOLUTION INTRAVENOUS at 00:47

## 2023-03-06 RX ADMIN — ROPINIROLE 2 MG: 2 TABLET, FILM COATED ORAL at 21:45

## 2023-03-06 RX ADMIN — OLANZAPINE 5 MG: 5 TABLET ORAL at 09:24

## 2023-03-06 RX ADMIN — LORAZEPAM 0.5 MG: 0.5 TABLET ORAL at 00:44

## 2023-03-06 RX ADMIN — SERTRALINE 50 MG: 50 TABLET, FILM COATED ORAL at 09:26

## 2023-03-06 RX ADMIN — HYDRALAZINE HYDROCHLORIDE 50 MG: 50 TABLET, FILM COATED ORAL at 21:46

## 2023-03-06 RX ADMIN — LORAZEPAM 0.5 MG: 0.5 TABLET ORAL at 06:57

## 2023-03-06 RX ADMIN — LEVETIRACETAM 250 MG: 250 TABLET, FILM COATED ORAL at 21:45

## 2023-03-06 RX ADMIN — DEXTROSE MONOHYDRATE 25 G: 25 INJECTION, SOLUTION INTRAVENOUS at 20:04

## 2023-03-06 NOTE — PLAN OF CARE
Goal Outcome Evaluation:  Plan of Care Reviewed With: patient        Progress: no change  Outcome Evaluation: na phosphate given iv, hemodialysis shunt intact-to have dialysis mon, ativan given per schedule, no c/o pain, renal boost given x2, accuchecks better, heel protectors on, left eye swollen more than right, isolation maintained, ref scd at times, several loose bm's-refusing lactulose, accumax

## 2023-03-06 NOTE — PLAN OF CARE
Goal Outcome Evaluation:   Patient drowsy in the beginning of the shift. Ativan held till later afternoon. Dialysis performed. Heparin subq started due to patient refusing SCDs. Insulin given x1 dose. Heel protectors on. Weight shifting provided. Multiple liquid bowel movements throughout shift. Blanchable redness in bo-area and buttocks - cream applied with each brief change.

## 2023-03-06 NOTE — PROGRESS NOTES
[facility] Progress Note      Patient Name: Zaina Martinez  : 1965  MRN: [id number]  Primary Care Physician:  Norman Serrato MD  Date of admission: 3/4/2023    Subjective     Interval History:   Feels slightly better than yesterday, but still having right leg pain  Appetite is poor, but no N/V  Reports being SOB, but breathing looks comfortable on RA as she lies flat    Review of Systems:   As noted above    Objective     Vitals:   Temp:  [96.8 °F (36 °C)-98.9 °F (37.2 °C)] 98.9 °F (37.2 °C)  Heart Rate:  [78-86] 84  Resp:  [16] 16  BP: (124-148)/(60-90) 148/90    Intake/Output Summary (Last 24 hours) at 3/5/2023 5357  Last data filed at 3/5/2023 0943  Gross per 24 hour   Intake 240 ml   Output --   Net 240 ml       Physical Exam:    Constitutional:  Groggy but more awake than yesterday, frail, chr ill  HEENT: Sclera anicteric, no conjunctival injection, dry MM, periorbital edema  Neck: Supple, no carotid bruit, trachea at midline, no JVD  Respiratory: Coarse with decreased BS in bases, L>R, nonlabored Cardiovascular: RRR, no rub, 2/6M  Vascular: Right chest TDC  Gastrointestinal: BS +, soft, not tender, and nondistended  : No palpable bladder  Musculoskeletal: No significant edema; + clubbing  Psychiatric:  Conversant, groggy, not oriented  Neurologic: moving all extremities, minimal speech; ptosis on left  Skin: Warm and dry       Scheduled Meds:     amLODIPine, 10 mg, Oral, Q24H  aspirin, 81 mg, Oral, Daily  carvedilol, 3.125 mg, Oral, BID With Meals  Desvenlafaxine Succinate ER, 25 mg, Oral, Daily  folic acid, 1 mg, Oral, Daily  hydrALAZINE, 50 mg, Oral, Q12H  insulin lispro, 0-9 Units, Subcutaneous, TID AC  lactulose, 15 mL, Oral, Q12H  levETIRAcetam, 250 mg, Oral, BID  levothyroxine, 300 mcg, Oral, Daily  lisinopril, 20 mg, Oral, Nightly  LORazepam, 0.5 mg, Oral, Q6H  OLANZapine, 5 mg, Oral, BID  pantoprazole, 40 mg, Oral, Daily  rOPINIRole, 2 mg, Oral,  Nightly  sertraline, 50 mg, Oral, Daily  sodium chloride, 10 mL, Intravenous, Q12H      IV Meds:        Results Reviewed:   I have personally reviewed the results from the time of this admission to 3/5/2023 22:37 EST     Results from last 7 days   Lab Units 03/05/23 0527 03/04/23  1047   SODIUM mmol/L 133* 129*   POTASSIUM mmol/L 4.0 4.2   CHLORIDE mmol/L 94* 91*   CO2 mmol/L 31.0* 30.2*   BUN mg/dL 23* 19   CREATININE mg/dL 3.07* 2.38*   CALCIUM mg/dL 7.9* 8.2*   BILIRUBIN mg/dL  --  0.5   ALK PHOS U/L  --  161*   ALT (SGPT) U/L  --  5   AST (SGOT) U/L  --  25   GLUCOSE mg/dL 22* 143*       Estimated Creatinine Clearance: 18.8 mL/min (A) (by C-G formula based on SCr of 3.07 mg/dL (H)).    Results from last 7 days   Lab Units 03/05/23  0527   MAGNESIUM mg/dL 1.8   PHOSPHORUS mg/dL 2.0*             Results from last 7 days   Lab Units 03/05/23 0527 03/04/23  0931   WBC 10*3/mm3 14.44* 21.55*   HEMOGLOBIN g/dL 7.4* 9.9*   PLATELETS 10*3/mm3 276 293             Assessment / Plan     ASSESSMENT:  1.  ESRD: hyponatremia with ESRD and chronic lung disease; potassium normal though phosphorus low  2.  IDDM with hypoglycemia  3.  Failure to thrive, with frequent admissions to hospital  4.  PVD with prior stroke  5.  Severe depression  6.  Chronic CHF  7.  Chronic pain syndrome  8.  Atrial fibrillation and flutter on AC  9.  Severe hypothyroidism in the past due to noncompliance: TSH stabilizing    PLAN:  1.  HD tomorrow with fluid removal as blood pressures allow  2.  Sodium phosphate IV  3.  Surveillance labs    Thank you for involving us in the care of Zaina Martinez.  Please feel free to call with any questions.    Alejo Lange MD  03/05/23  22:37 EST    [facility]  [phone]    Please note that portions of this note were completed with a voice recognition program.

## 2023-03-06 NOTE — PROGRESS NOTES
Name: Zaina Martinez ADMIT: 3/4/2023   : 1965  PCP: Norman Serrato MD    MRN: [id number] LOS: 0 days   AGE/SEX: 57 y.o. female  ROOM: [facility]     Subjective   Subjective   Patient lying comfortably in bed.  Just woke up and is little drowsy.  No complaints.    Review of Systems   Constitutional: Negative for chills and fever.   Respiratory: Negative for cough and shortness of breath.    Cardiovascular: Negative for chest pain and leg swelling.   Gastrointestinal: Negative for abdominal pain, diarrhea and nausea.     As above     Objective   Objective   Vital Signs  Temp:  [96.9 °F (36.1 °C)-98.9 °F (37.2 °C)] 97.1 °F (36.2 °C)  Heart Rate:  [84-90] 86  Resp:  [14-16] 14  BP: (124-159)/(60-90) 159/80  SpO2:  [94 %-98 %] 98 %  on   ;   Device (Oxygen Therapy): room air  Body mass index is 24.84 kg/m².  Physical Exam  Constitutional:       General: She is not in acute distress.     Comments: Chronically ill-appearing   HENT:      Head: Normocephalic and atraumatic.   Cardiovascular:      Rate and Rhythm: Normal rate and regular rhythm.      Heart sounds:     No gallop.   Pulmonary:      Effort: Pulmonary effort is normal. No respiratory distress.   Abdominal:      General: Abdomen is flat. There is no distension.      Palpations: Abdomen is soft.      Tenderness: There is no abdominal tenderness.   Musculoskeletal:         General: No swelling or deformity. Normal range of motion.   Skin:     General: Skin is warm and dry.   Neurological:      General: No focal deficit present.      Mental Status: She is alert. Mental status is at baseline.         Results Review     I reviewed the patient's new clinical results.  Results from last 7 days   Lab Units 23  0451 23  0523  0931   WBC 10*3/mm3 13.93* 14.44* 21.55*   HEMOGLOBIN g/dL 7.0* 7.4* 9.9*   PLATELETS 10*3/mm3 238 276 293     Results from last 7 days   Lab Units 23  0451 23  0527 23  1047   SODIUM mmol/L 132* 133*  129*   POTASSIUM mmol/L 4.7 4.0 4.2   CHLORIDE mmol/L 94* 94* 91*   CO2 mmol/L 25.3 31.0* 30.2*   BUN mg/dL 29* 23* 19   CREATININE mg/dL 3.50* 3.07* 2.38*   GLUCOSE mg/dL 143* 22* 143*   Estimated Creatinine Clearance: 15.3 mL/min (A) (by C-G formula based on SCr of 3.5 mg/dL (H)).  Results from last 7 days   Lab Units 03/06/23  0451 03/05/23 0527 03/04/23  1047   ALBUMIN g/dL 2.2* 2.3* 2.7*   BILIRUBIN mg/dL  --   --  0.5   ALK PHOS U/L  --   --  161*   AST (SGOT) U/L  --   --  25   ALT (SGPT) U/L  --   --  5     Results from last 7 days   Lab Units 03/06/23 0451 03/05/23 0527 03/04/23  1047   CALCIUM mg/dL 7.9* 7.9* 8.2*   ALBUMIN g/dL 2.2* 2.3* 2.7*   MAGNESIUM mg/dL 1.6 1.8  --    PHOSPHORUS mg/dL 6.2* 2.0*  --      Results from last 7 days   Lab Units 03/04/23  1047   PROCALCITONIN ng/mL 1.76*     COVID19   Date Value Ref Range Status   02/05/2023 Not Detected Not Detected - Ref. Range Final   02/01/2023 Not Detected Not Detected - Ref. Range Final     Glucose   Date/Time Value Ref Range Status   03/06/2023 1120 202 (H) 70 - 130 mg/dL Final     Comment:     Meter: [id number] : [id number] Boone Gold NA   03/06/2023 0551 149 (H) 70 - 130 mg/dL Final     Comment:     Meter: [id number] : [id number] Azael MAJOR NA   03/05/2023 2146 82 70 - 130 mg/dL Final     Comment:     Meter: [id number] : [id number] Azael MAJOR NA   03/05/2023 1618 102 70 - 130 mg/dL Final     Comment:     Meter: [id number] : [id number] Karan Fam    03/05/2023 1114 261 (H) 70 - 130 mg/dL Final     Comment:     Meter: [id number] : [id number] Karan Fam    03/05/2023 0859 139 (H) 70 - 130 mg/dL Final     Comment:     Meter: [id number] : [id number] Jessica Bello    03/05/2023 0645 120 70 - 130 mg/dL Final     Comment:     Meter: [id number] : [id number] Azael SAAVEDRA       XR Chest 1 View  ONE VIEW PORTABLE CHEST AT 11:25 AM     HISTORY: Hypoglycemia. Leukocytosis. Chronic renal  failure.     FINDINGS: The lungs are well-expanded with persistent increased density  in the lower left chest that obscures the diaphragm and consistent with  moderately large left pleural effusion and adjacent dense atelectasis as  also noted on the chest CT scan dated 02/08/2023. This is also similar  to the chest x-ray dated 02/05/2023. The right lung remains clear. The  heart remains enlarged. A large gauge right-sided vascular catheter ends  in the right atrium without change.     This report was finalized on 3/4/2023 11:51 AM by Dr. Ronnei Ritchie M.D.       I reviewed the patient's daily medications.  Scheduled Medications  amLODIPine, 10 mg, Oral, Q24H  aspirin, 81 mg, Oral, Daily  carvedilol, 3.125 mg, Oral, BID With Meals  Desvenlafaxine Succinate ER, 25 mg, Oral, Daily  folic acid, 1 mg, Oral, Daily  heparin (porcine), 5,000 Units, Subcutaneous, Q8H  hydrALAZINE, 50 mg, Oral, Q12H  insulin lispro, 0-9 Units, Subcutaneous, TID AC  lactulose, 15 mL, Oral, Q12H  levETIRAcetam, 250 mg, Oral, BID  levothyroxine, 300 mcg, Oral, Daily  lisinopril, 20 mg, Oral, Nightly  LORazepam, 0.5 mg, Oral, Q6H  OLANZapine, 5 mg, Oral, BID  pantoprazole, 40 mg, Oral, Daily  rOPINIRole, 2 mg, Oral, Nightly  sertraline, 50 mg, Oral, Daily  sodium chloride, 10 mL, Intravenous, Q12H    Infusions   Diet  Diet: Cardiac Diets, Diabetic Diets, Renal Diets; Healthy Heart (2-3 Na+); Consistent Carbohydrate; Low Sodium (2-3g), Low Potassium, Low Phosphorus; Texture: Regular Texture (IDDSI 7); Fluid Consistency: Thin (IDDSI 0)         I have personally reviewed:  [x]  Laboratory   []  Microbiology   []  Radiology   [x]  EKG/Telemetry   []  Cardiology/Vascular   []  Pathology   []  Records     Assessment/Plan     Active Hospital Problems    Diagnosis  POA   • **Hypoglycemia due to type 2 diabetes mellitus (HCC) [E11.649]  Yes   • Ureteral stent present [Z96.0]  Not Applicable   • Pleural effusion [J90]  Yes   • History of stroke- R MCA  s/p TPA with subsequent ICH with debility [Z86.73]  Not Applicable   • Hyponatremia [E87.1]  Yes   • ESRD (end stage renal disease) ([facility]) [N18.6]  Yes   • Abnormal urinalysis [R82.90]  Yes   • Hypothyroidism [E03.9]  Yes   • Rheumatoid arthritis ([facility]) [M06.9]  Yes   • Type 2 diabetes mellitus with kidney complication, with long-term current use of insulin ([facility]) [E11.29, Z79.4]  Not Applicable   • Leukocytosis [D72.829]  Yes   • Anemia [D64.9]  Yes      Resolved Hospital Problems   No resolved problems to display.       57 y.o. female admitted with Hypoglycemia due to type 2 diabetes mellitus ([facility]).    Hypoglycemia  Diabetes type 2  - Accu-Cheks at goal  -Patient has asymptomatic hypoglycemia so hypoglycemia for her is especially dangerous  -Continue sliding scale insulin only.  Do not plan on initiating any long-acting insulin on discharge as patient has asymptomatic hypoglycemia down to 19 with only 3 units of Lantus.  Would also not give any sliding scale insulin the patient does not plan on eating a meal.    Leukocytosis, improved  - seems near her baseline  -Likely secondary to stress response to her hypoglycemia  -Abnormal UA without any symptoms  -Chest x-ray with moderately large left pleural effusion  -Blood cultures no growth at 24 hours  -Procalcitonin mildly elevated,    Abnormal UA  -History of multiple abnormal UAs  -Patient without any signs or symptoms of urinary tract infection    Paroxysmal atrial fibrillation-continue home Coreg and apixaban    ESRD- renal consulted, appreciate recommendations    Hypothyroidism-continue home Synthroid    Seizure disorder-continue home Keppra, renally dressed    Hypertension-continue home Coreg, amlodipine, hydralazine, lisinopril    RLS - continue home requip    Depression - continue home zoloft and desvenlafaxine    · SCDs for DVT prophylaxis.  · Full code.  · Discussed with patient and nursing staff.  · Anticipate discharge Nursing home once arrangements have  been made.      Eddie Givens MD  [facility]  03/06/23  12:31 EST

## 2023-03-06 NOTE — PROGRESS NOTES
Discharge Planning Assessment  [facility]     Patient Name: Zaina Martinez  MRN: [id number]  Today's Date: 3/6/2023    Admit Date: 3/4/2023    Plan: Return back to [facility] pending insurance pprecert.  VM left for pt's daughter to confirm   Discharge Needs Assessment     Row Name 03/06/23 1317       Living Environment    People in Home spouse    Current Living Arrangements residential facility    Primary Care Provided by other (see comments)  currently at [facility] skilled care    Family Caregiver if Needed none    Quality of Family Relationships supportive       Discharge Needs Assessment    Equipment Currently Used at Home walker, rolling;oxygen;grab bar               Discharge Plan     Row Name 03/06/23 1322       Plan    Plan Return back to [facility] pending insurance pprecert.  VM left for pt's daughter to confirm    Plan Comments Pt admitted form [facility] from skilled level of care. Spoke to Dang with [facility] who confirmed pt can return but will need a precert to returned.  VM left for Dang with [facility] to request her start insurnce precert today.VM left for pt's daughter Fiona to confirm plan will be for pt to return back to [facility].  CCP will follow up to confirm precert and  family agreeable for pt to return to [facility].              Continued Care and Services - Admitted Since 3/4/2023     Destination     Service Provider Request Status Selected Services Address Phone Fax Patient Preferred    [facility] Request Sent N/A [address] [facility] [id number] [phone] --            Selected Continued Care - Prior Encounters Includes continued care and service providers with selected services from prior encounters from 12/4/2022 to 3/6/2023    Discharged on 2/11/2023 Admission date: 2/1/2023 - Discharge disposition: Skilled Nursing Facility (DC - External)    Destination      Service Provider Selected Services Address Phone Fax Patient Preferred    [facility] [address], [facility] [address] [phone] --                Discharged on 1/29/2023 Admission date: 12/26/2022 - Discharge disposition: Home-Health Care [facility]    Home Medical Care     Service Provider Selected Services Address Phone Fax Patient Preferred    [facility] [address], [place] [facility] [place] [address] [phone] --                Discharged on 12/9/2022 Admission date: 12/4/2022 - Discharge disposition: Home-Health Care [facility]     Service Provider Selected Services Address Phone Fax Patient Preferred    [facility] [address] [place] [id number] [phone] --                       Demographic Summary     Row Name 03/06/23 1315       General Information    Admission Type observation    Arrived From subacute/long-term acute care    Reason for Consult discharge planning    Preferred Language English               Functional Status     Row Name 03/06/23 1316       Functional Status    Usual Activity Tolerance fair    Current Activity Tolerance fair       Functional Status, IADL    Medications completely dependent    Meal Preparation completely dependent    Housekeeping completely dependent    Laundry completely dependent    Shopping completely dependent               Psychosocial    No documentation.                Abuse/Neglect    No documentation.                Legal    No documentation.                Substance Abuse    No documentation.                Patient Forms    No documentation.                   Gladys Rao MSW

## 2023-03-06 NOTE — PLAN OF CARE
Goal Outcome Evaluation:  Plan of Care Reviewed With: patient           Outcome Evaluation: Patient is a 57 y.o female who presents to [facility] with hypoglycemia. Pmhx includes CVA, recent R femur fx, and RA.  Patient has recently been at rehab since hospitilization in February.  at bedside reports patient has not been home since Anupam. Patient AOx3 supine in bed upon arrival. Patient appears very drowsy this date. Patient required maxA to sit up to EOB. Patient required modA-MaxA to maintain sitting balance at EOB at times. Philip for brief periods with VCs. Patient performed STS from EOB with modA and performed a pivot transfer over to chair with maxA. Patient reclined in chair at end of session. Patient demonstrates decreased strength, balance and activity endurance. Patient would continue to benefit from skilled PT intervention to address deficits in functional mobility. PT recommends SNF at d/c. PT will continue to monitor.

## 2023-03-06 NOTE — THERAPY EVALUATION
Patient Name: Zaina Martinez  : 1965    MRN: [id number]                              Today's Date: 3/6/2023       Admit Date: 3/4/2023    Visit Dx:     ICD-10-CM ICD-9-CM   1. Hypoglycemia due to type 2 diabetes mellitus ([facility])  E11.649 250.80   2. Leukocytosis, unspecified type  D72.829 288.60   3. Rheumatoid arthritis, involving unspecified site, unspecified whether rheumatoid factor present ([facility])  M06.9 714.0   4. ESRD (end stage renal disease) ([facility])  N18.6 585.6     Patient Active Problem List   Diagnosis   • Renal insufficiency   • Hypertensive disorder   • Hypothyroidism   • Type 2 diabetes mellitus with kidney complication, with long-term current use of insulin ([facility])   • Rheumatoid arthritis ([facility])   • Angioedema   • Esophageal dysmotility   • Anemia   • Medically noncompliant   • Myocardial infarction due to demand ischemia ([facility])   • Enteritis   • PRES (posterior reversible encephalopathy syndrome)   • Urine retention   • Klebsiella infection   • Superficial thrombophlebitis   • Generalized weakness   • ESRD (end stage renal disease) ([facility])   • CAD (coronary artery disease)   • Abnormal urinalysis   • Chronic diastolic CHF (congestive heart failure) ([facility])   • Pyelonephritis   • Calculus of gallbladder with acute on chronic cholecystitis without obstruction   • Pleural effusion on right   • Anemia due to chronic kidney disease, on chronic dialysis ([facility])   • Abnormal findings on diagnostic imaging of other specified body structures   • Acute upper respiratory infection   • Agitation   • Alkaline phosphatase raised   • Casts present in urine   • Cellulitis of toe   • Hip pain   • Community acquired pneumonia   • Depressive disorder   • Diarrhea of presumed infectious origin   • Difficult or painful urination   • Disease due to severe acute respiratory syndrome coronavirus 2 (SARS-CoV-2)   • Dyspnea   • Encounter for follow-up examination after completed treatment for conditions other than malignant neoplasm    • H/O: hypothyroidism   • Hyperlipidemia   • Hypomagnesemia   • Intractable vomiting with nausea   • Leukocytosis   • Luetscher's syndrome   • Need for influenza vaccination   • Restless legs   • Noncompliance with treatment   • Shoulder pain   • Acute UTI (urinary tract infection)   • Metabolic encephalopathy   • Abnormal findings on diagnostic imaging of abdomen   • Status post cholecystectomy   • Hyponatremia   • Acute metabolic encephalopathy   • Encephalopathy, toxic   • Acute CVA (cerebrovascular accident) ([facility])   • History of intracranial hemorrhage   • Stroke ([facility])   • Abnormal urinalysis   • Diabetic muscle infarction ([facility])   • Other insomnia   • Altered mental status   • History of stroke- R MCA s/p TPA with subsequent ICH with debility   • Heart murmur   • Bradycardia   • Left lower lobe pneumonia   • Metabolic encephalopathy   • Pericardial effusion   • Pleural effusion   • Acute pulmonary edema ([facility])   • Atrial flutter ([facility])   • Chronic systolic CHF (congestive heart failure) ([facility])   • Thrombus of venous dialysis catheter ([facility])   • Sepsis without acute organ dysfunction ([facility])   • Type 2 diabetes mellitus with hyperglycemia, with long-term current use of insulin ([facility])   • Acute respiratory failure with hypoxia ([facility])   • Acute on chronic systolic CHF (congestive heart failure) ([facility])   • Hyperkalemia   • Acute respiratory failure with hypoxia ([facility])   • Other hypervolemia   • Hematoma of right hip, initial encounter   • Ureteral stent present   • Closed intertrochanteric fracture of right femur ([facility])   • Witnessed seizure-like activity ([facility])   • Acute respiratory failure with hypercapnia ([facility])   • Opioid use   • Diabetic muscle infarction ([facility])   • Acute posthemorrhagic anemia   • Kidney hematoma   • Pyuria   • Severe malnutrition ([facility])   • Hypoglycemia due to type 2 diabetes mellitus ([facility])     Past Medical History:   Diagnosis Date   • Acute CVA (cerebrovascular accident) ([facility]) 05/01/2022   • Acute on  chronic diastolic CHF (congestive heart failure) ([facility])    • Anemia    • CAD (coronary artery disease) 12/20/2021   • Diabetes ([facility])    • Disease of thyroid gland    • GERD (gastroesophageal reflux disease)    • History of intracranial hemorrhage 5/1/2022   • Hyperlipidemia 11/30/2018   • Hypertension    • Rheumatoid arthritis ([facility])    • Stage 5 chronic kidney disease ([facility])      Past Surgical History:   Procedure Laterality Date   • CHOLECYSTECTOMY WITH INTRAOPERATIVE CHOLANGIOGRAM N/A 1/10/2022    Procedure: Laparoscopic cholecystectomy with intraoperative cholangiogram;  Surgeon: Ramana Raygoza MD;  Location[facility];  Service: General;  Laterality: N/A;   • CYSTOSCOPY W/ URETERAL STENT PLACEMENT Left 9/29/2022    Procedure: CYSTOSCOPY URETERAL STENT INSERTION WITH RETROGRADE PYELOGRAM;  Surgeon: Bryant Phan Jr., MD;  [facility];  Service: Urology;  Laterality: Left;   • CYSTOSCOPY W/ URETERAL STENT PLACEMENT Left 1/10/2023    Procedure: CYSTOSCOPY LEFT STENT REMOVAL;  Surgeon: Bryant Phan Jr., MD;  [facility];  Service: Urology;  Laterality: Left;   • EMBOLIZATION MESENTERIC ARTERY N/A 1/1/2023    Procedure: LEFT KIDNEY EMBOLIZATION;  Surgeon: Bijan Ordoñez MD;  Location: [facility] 18/19;  Service: Interventional Radiology;  Laterality: N/A;   • EYE SURGERY     • FEMUR IM NAILING/RODDING Right 11/10/2022    Procedure: FEMUR INTRAMEDULLARY NAILING/RODDING;  Surgeon: Glen Pierce MD;  Location: [facility] OR;  Service: Orthopedics;  Laterality: Right;   • HIP INTERTROCHANTERIC NAILING Right 11/10/2022    Procedure: HIP INTERTROCHANTERIC NAILING;  Surgeon: Glen Pierce MD;  Location: [facility] OR;  Service: Orthopedics;  Laterality: Right;   • HYSTERECTOMY     • INSERT CENTRAL LINE AT BEDSIDE  12/31/2022        • INSERTION HEMODIALYSIS CATHETER N/A 12/6/2021    Procedure: HEMODIALYSIS CATHETER INSERTION;  Surgeon: Martin  Keli Desai MD;  [facility] 18/19;  Service: Vascular;  Laterality: N/A;   • INSERTION HEMODIALYSIS CATHETER N/A 5/3/2022    Procedure: TUNNELED CATHETER PLACEMENT;  Surgeon: Keli Salazar MD;  Location: [facility];  Service: Vascular;  Laterality: N/A;   • MUSCLE BIOPSY Left 6/15/2022    Procedure: Left quadriceps muscle biopsy;  Surgeon: Marques Ma MD;  Location: [facility] OR;  Service: Neurosurgery;  Laterality: Left;   • URETEROSCOPY LASER LITHOTRIPSY WITH STENT INSERTION Left 12/30/2022    Procedure: CYSTOSCOPY WITH LEFT  URETEROSCOPY  LEFT STENT EXCHANGE;  Surgeon: Bryant Phan Jr., MD;  Location: [facility] OR;  Service: Urology;  Laterality: Left;      General Information     Row Name 03/06/23 1005          Physical Therapy Time and Intention    Document Type evaluation  -SM     Mode of Treatment individual therapy;physical therapy  -     Row Name 03/06/23 1005          General Information    Patient Profile Reviewed yes  -SM     Prior Level of Function min assist:;mod assist:  -SM     Existing Precautions/Restrictions fall  -SM     Barriers to Rehab medically complex;previous functional deficit  -SM     Row Name 03/06/23 1005          Living Environment    People in Home spouse  -SM     Row Name 03/06/23 1005          Cognition    Orientation Status (Cognition) oriented x 3  -     Row Name 03/06/23 1005          Safety Issues, Functional Mobility    Safety Issues Affecting Function (Mobility) sequencing abilities;problem-solving;safety precautions follow-through/compliance;safety precaution awareness  -SM     Impairments Affecting Function (Mobility) balance;postural/trunk control;strength;endurance/activity tolerance;pain  -SM           User Key  (r) = Recorded By, (t) = Taken By, (c) = Cosigned By    Initials Name Provider Type    SM Aviva Salgado PT Physical Therapist               Mobility     Row Name 03/06/23 1006          Bed Mobility    Bed Mobility  supine-sit  -     Supine-Sit Ware (Bed Mobility) maximum assist (25% patient effort);verbal cues  -     Assistive Device (Bed Mobility) bed rails;head of bed elevated;draw sheet  -     Comment, (Bed Mobility) Patient able to follow commands to begin to move toward EOB  -     Row Name 03/06/23 1006          Bed-Chair Transfer    Bed-Chair Ware (Transfers) maximum assist (25% patient effort);verbal cues;nonverbal cues (demo/gesture)  -     Row Name 03/06/23 1006          Sit-Stand Transfer    Sit-Stand Ware (Transfers) moderate assist (50% patient effort);verbal cues;nonverbal cues (demo/gesture)  -     Assistive Device (Sit-Stand Transfers) other (see comments)  Supported by therapist arms  -           User Key  (r) = Recorded By, (t) = Taken By, (c) = Cosigned By    Initials Name Provider Type     Aviva Salgado PT Physical Therapist               Obj/Interventions     Row Name 03/06/23 1007          Range of Motion Comprehensive    General Range of Motion bilateral lower extremity ROM WFL  -[facility] Name 03/06/23 1007          Strength Comprehensive (MMT)    General Manual Muscle Testing (MMT) Assessment lower extremity strength deficits identified  -     Comment, General Manual Muscle Testing (MMT) Assessment B LEs grossly 2+/5  -[facility] Name 03/06/23 1007          Motor Skills    Motor Skills functional endurance  -     Functional Endurance poor  -[facility] Name 03/06/23 1007          Balance    Balance Assessment sitting static balance;sitting dynamic balance;sit to stand dynamic balance;standing static balance  -     Static Sitting Balance minimal assist;moderate assist;maximum assist;verbal cues  -     Dynamic Sitting Balance moderate assist;maximum assist;verbal cues  -     Position, Sitting Balance supported;sitting edge of bed  -     Sit to Stand Dynamic Balance moderate assist;maximum assist;verbal cues;non-verbal cues (demo/gesture)  -      Static Standing Balance maximum assist  -SM     Balance Interventions sitting;supported;static;dynamic;sit to stand;standing  -SM           User Key  (r) = Recorded By, (t) = Taken By, (c) = Cosigned By    Initials Name Provider Type    Aviva Jarvis PT Physical Therapist               Goals/Plan     Row Name 03/06/23 1013          Bed Mobility Goal 1 (PT)    Activity/Assistive Device (Bed Mobility Goal 1, PT) bed mobility activities, all  -SM     [place] Level/Cues Needed (Bed Mobility Goal 1, PT) minimum assist (75% or more patient effort)  -SM     Time Frame (Bed Mobility Goal 1, PT) 2 weeks  -SM     Row Name 03/06/23 1013          Transfer Goal 1 (PT)    Activity/Assistive Device (Transfer Goal 1, PT) sit-to-stand/stand-to-sit;bed-to-chair/chair-to-bed  -SM     [place] Level/Cues Needed (Transfer Goal 1, PT) minimum assist (75% or more patient effort)  -SM     Time Frame (Transfer Goal 1, PT) 2 weeks  -SM     Row Name 03/06/23 1013          Gait Training Goal 1 (PT)    Activity/Assistive Device (Gait Training Goal 1, PT) gait (walking locomotion);walker, rolling  -SM     [place] Level (Gait Training Goal 1, PT) minimum assist (75% or more patient effort)  -SM     Distance (Gait Training Goal 1, PT) 10ft  -SM     Time Frame (Gait Training Goal 1, PT) 2 weeks  -SM           User Key  (r) = Recorded By, (t) = Taken By, (c) = Cosigned By    Initials Name Provider Type     Aviva Salgado PT Physical Therapist               Clinical Impression     Row Name 03/06/23 1008          Pain    Pretreatment Pain Rating 0/10 - no pain  -SM     Posttreatment Pain Rating 0/10 - no pain  -SM     Row Name 03/06/23 1008          Plan of Care Review    Plan of Care Reviewed With patient  -     Outcome Evaluation Patient is a 57 y.o female who presents to [facility] with hypoglycemia. Pmhx includes CVA, recent R femur fx, and RA.  Patient has recently been at rehab since hospitilization in February.  at  bedside reports patient has not been home since Christmas. Patient AOx3 supine in bed upon arrival. Patient appears very drowsy this date. Patient required maxA to sit up to EOB. Patient required modA-MaxA to maintain sitting balance at EOB at times. Philip for brief periods with VCs. Patient performed STS from EOB with modA and performed a pivot transfer over to chair with maxA. Patient reclined in chair at end of session. Patient demonstrates decreased strength, balance and activity endurance. Patient would continue to benefit from skilled PT intervention to address deficits in functional mobility. PT recommends SNF at d/c. PT will continue to monitor.  -     Row Name 03/06/23 1008          Therapy Assessment/Plan (PT)    Rehab Potential (PT) good, to achieve stated therapy goals  -     Criteria for Skilled Interventions Met (PT) yes  -     Therapy Frequency (PT) 5 times/wk  -     Row Name 03/06/23 1008          Vital Signs    O2 Delivery Pre Treatment room air  -SM     O2 Delivery Intra Treatment room air  -SM     O2 Delivery Post Treatment room air  -SM     Pre Patient Position Supine  -     Intra Patient Position Standing  -     Post Patient Position Sitting  -     Row Name 03/06/23 1008          Positioning and Restraints    Pre-Treatment Position in bed  -SM     Post Treatment Position chair  -SM     In Chair call light within reach;encouraged to call for assist;exit alarm on;with family/caregiver;reclined;notified [facility]           User Key  (r) = Recorded By, (t) = Taken By, (c) = Cosigned By    Initials Name Provider Type     Aviva Salgado, VICKIE Physical Therapist               Outcome Measures     Row Name 03/06/23 1014 03/06/23 0931       How much help from another person do you currently need...    Turning from your back to your side while in flat bed without using bedrails? 2  -SM 4  -LH    Moving from lying on back to sitting on the side of a flat bed without bedrails? 2  -SM 3  -LH     Moving to and from a bed to a chair (including a wheelchair)? 2  - 2  -    Standing up from a chair using your arms (e.g., wheelchair, bedside chair)? 2  - 2  -LH    Climbing 3-5 steps with a railing? 1  - 1  -LH    To walk in hospital room? 1  - 1  -    AM-PAC 6 Clicks Score (PT) 10  - 13  -    Highest level of mobility 4 --> Transferred to chair/commode  - 4 --> Transferred to chair/commode  -    Row Name 03/06/23 1014          Functional Assessment    Outcome Measure Options AM-PAC 6 Clicks Basic Mobility (PT)  -           User Key  (r) = Recorded By, (t) = Taken By, (c) = Cosigned By    Initials Name Provider Type     Shannon Singleton, RN Registered Nurse     Aviva Salgado, VICKIE Physical Therapist                             Physical Therapy Education     Title: PT OT SLP Therapies (In Progress)     Topic: Physical Therapy (In Progress)     Point: Mobility training (Done)     Learning Progress Summary           Patient Acceptance, E, VU,NR by  at 3/6/2023 1015                   Point: Home exercise program (Not Started)     Learner Progress:  Not documented in this visit.          Point: Body mechanics (Done)     Learning Progress Summary           Patient Acceptance, E, VU,NR by  at 3/6/2023 1015                   Point: Precautions (Done)     Learning Progress Summary           Patient Acceptance, E, VU,NR by  at 3/6/2023 1015                               User Key     Initials Effective Dates Name Provider Type Discipline     05/02/22 -  Aviva Salgado, VICKIE Physical Therapist PT              PT Recommendation and Plan     Plan of Care Reviewed With: patient  Outcome Evaluation: Patient is a 57 y.o female who presents to [facility] with hypoglycemia. Pmhx includes CVA, recent R femur fx, and RA.  Patient has recently been at rehab since hospitilization in February.  at bedside reports patient has not been home since Christmas. Patient AOx3 supine in bed upon arrival.  Patient appears very drowsy this date. Patient required maxA to sit up to EOB. Patient required modA-MaxA to maintain sitting balance at EOB at times. Philip for brief periods with VCs. Patient performed STS from EOB with modA and performed a pivot transfer over to chair with maxA. Patient reclined in chair at end of session. Patient demonstrates decreased strength, balance and activity endurance. Patient would continue to benefit from skilled PT intervention to address deficits in functional mobility. PT recommends SNF at d/c. PT will continue to monitor.     Time Calculation:    PT Charges     Row Name 03/06/23 1016             Time Calculation    Start Time 0938  -SM      Stop Time 0955  -SM      Time Calculation (min) 17 min  -SM      PT Received On 03/06/23  -      PT - Next Appointment 03/07/23  -SM      PT Goal Re-Cert Due Date 03/20/23  -         Time Calculation- PT    Total Timed Code Minutes- PT 10 minute(s)  -SM         Timed Charges    13484 - PT Therapeutic Activity Minutes 10  -SM         Total Minutes    Timed Charges Total Minutes 10  -SM       Total Minutes 10  -SM            User Key  (r) = Recorded By, (t) = Taken By, (c) = Cosigned By    Initials Name Provider Type     Aviva Salgado PT Physical Therapist              Therapy Charges for Today     Code Description Service Date Service Provider Modifiers Qty    [id number]  PT THERAPEUTIC ACT EA 15 MIN 3/6/2023 Aviva Salgado, PT GP 1    [id number]  PT EVAL MOD COMPLEXITY 3 3/6/2023 Aviva Salgado, PT GP 1          PT G-Codes  Outcome Measure Options: AM-PAC 6 Clicks Basic Mobility (PT)  AM-PAC 6 Clicks Score (PT): 10  PT Discharge Summary  Anticipated Discharge Disposition (PT): skilled nursing facility  Patient was not wearing a face mask during this therapy encounter. Therapist used appropriate personal protective equipment including gown, eye protection, mask and gloves.  Mask used was standard procedure mask. Appropriate  PPE was worn during the entire therapy session. Hand hygiene was completed before and after therapy session. Patient is not in enhanced droplet precautions.     Aviva Salgado, PT  3/6/2023

## 2023-03-07 LAB
ABO GROUP BLD: NORMAL
BASOPHILS # BLD AUTO: 0.02 10*3/MM3 (ref 0–0.2)
BASOPHILS NFR BLD AUTO: 0.2 % (ref 0–1.5)
BLD GP AB SCN SERPL QL: NEGATIVE
DEPRECATED RDW RBC AUTO: 51.9 FL (ref 37–54)
EOSINOPHIL # BLD AUTO: 0.01 10*3/MM3 (ref 0–0.4)
EOSINOPHIL NFR BLD AUTO: 0.1 % (ref 0.3–6.2)
ERYTHROCYTE [DISTWIDTH] IN BLOOD BY AUTOMATED COUNT: 16.8 % (ref 12.3–15.4)
GLUCOSE BLDC GLUCOMTR-MCNC: 158 MG/DL (ref 70–130)
GLUCOSE BLDC GLUCOMTR-MCNC: 162 MG/DL (ref 70–130)
GLUCOSE BLDC GLUCOMTR-MCNC: 221 MG/DL (ref 70–130)
GLUCOSE BLDC GLUCOMTR-MCNC: 235 MG/DL (ref 70–130)
GLUCOSE BLDC GLUCOMTR-MCNC: 271 MG/DL (ref 70–130)
HCT VFR BLD AUTO: 20.8 % (ref 34–46.6)
HGB BLD-MCNC: 6.4 G/DL (ref 12–15.9)
IMM GRANULOCYTES # BLD AUTO: 0.04 10*3/MM3 (ref 0–0.05)
IMM GRANULOCYTES NFR BLD AUTO: 0.4 % (ref 0–0.5)
LYMPHOCYTES # BLD AUTO: 1.3 10*3/MM3 (ref 0.7–3.1)
LYMPHOCYTES NFR BLD AUTO: 12.4 % (ref 19.6–45.3)
MCH RBC QN AUTO: 25.7 PG (ref 26.6–33)
MCHC RBC AUTO-ENTMCNC: 30.8 G/DL (ref 31.5–35.7)
MCV RBC AUTO: 83.5 FL (ref 79–97)
MONOCYTES # BLD AUTO: 0.6 10*3/MM3 (ref 0.1–0.9)
MONOCYTES NFR BLD AUTO: 5.7 % (ref 5–12)
NEUTROPHILS NFR BLD AUTO: 8.52 10*3/MM3 (ref 1.7–7)
NEUTROPHILS NFR BLD AUTO: 81.2 % (ref 42.7–76)
NRBC BLD AUTO-RTO: 0 /100 WBC (ref 0–0.2)
PLATELET # BLD AUTO: 215 10*3/MM3 (ref 140–450)
PMV BLD AUTO: 9.3 FL (ref 6–12)
RBC # BLD AUTO: 2.49 10*6/MM3 (ref 3.77–5.28)
RH BLD: POSITIVE
T&S EXPIRATION DATE: NORMAL
WBC NRBC COR # BLD: 10.49 10*3/MM3 (ref 3.4–10.8)

## 2023-03-07 PROCEDURE — 82962 GLUCOSE BLOOD TEST: CPT

## 2023-03-07 PROCEDURE — 63710000001 INSULIN LISPRO (HUMAN) PER 5 UNITS: Performed by: STUDENT IN AN ORGANIZED HEALTH CARE EDUCATION/TRAINING PROGRAM

## 2023-03-07 PROCEDURE — 86901 BLOOD TYPING SEROLOGIC RH(D): CPT | Performed by: INTERNAL MEDICINE

## 2023-03-07 PROCEDURE — 86900 BLOOD TYPING SEROLOGIC ABO: CPT | Performed by: INTERNAL MEDICINE

## 2023-03-07 PROCEDURE — G0378 HOSPITAL OBSERVATION PER HR: HCPCS

## 2023-03-07 PROCEDURE — P9016 RBC LEUKOCYTES REDUCED: HCPCS

## 2023-03-07 PROCEDURE — 86900 BLOOD TYPING SEROLOGIC ABO: CPT

## 2023-03-07 PROCEDURE — 85025 COMPLETE CBC W/AUTO DIFF WBC: CPT | Performed by: STUDENT IN AN ORGANIZED HEALTH CARE EDUCATION/TRAINING PROGRAM

## 2023-03-07 PROCEDURE — 96372 THER/PROPH/DIAG INJ SC/IM: CPT

## 2023-03-07 PROCEDURE — 86850 RBC ANTIBODY SCREEN: CPT | Performed by: INTERNAL MEDICINE

## 2023-03-07 PROCEDURE — 25010000002 HEPARIN (PORCINE) PER 1000 UNITS: Performed by: STUDENT IN AN ORGANIZED HEALTH CARE EDUCATION/TRAINING PROGRAM

## 2023-03-07 PROCEDURE — 86923 COMPATIBILITY TEST ELECTRIC: CPT

## 2023-03-07 PROCEDURE — 36430 TRANSFUSION BLD/BLD COMPNT: CPT

## 2023-03-07 RX ORDER — INSULIN LISPRO 100 [IU]/ML
0-7 INJECTION, SOLUTION INTRAVENOUS; SUBCUTANEOUS
Status: DISCONTINUED | OUTPATIENT
Start: 2023-03-07 | End: 2023-03-11 | Stop reason: HOSPADM

## 2023-03-07 RX ADMIN — HYDRALAZINE HYDROCHLORIDE 50 MG: 50 TABLET, FILM COATED ORAL at 09:18

## 2023-03-07 RX ADMIN — HYDROCODONE BITARTRATE AND ACETAMINOPHEN 1 TABLET: 5; 325 TABLET ORAL at 09:19

## 2023-03-07 RX ADMIN — OLANZAPINE 5 MG: 5 TABLET ORAL at 09:19

## 2023-03-07 RX ADMIN — HYDRALAZINE HYDROCHLORIDE 50 MG: 50 TABLET, FILM COATED ORAL at 17:05

## 2023-03-07 RX ADMIN — INSULIN LISPRO 4 UNITS: 100 INJECTION, SOLUTION INTRAVENOUS; SUBCUTANEOUS at 17:05

## 2023-03-07 RX ADMIN — Medication 10 ML: at 20:31

## 2023-03-07 RX ADMIN — PANTOPRAZOLE SODIUM 40 MG: 40 TABLET, DELAYED RELEASE ORAL at 06:54

## 2023-03-07 RX ADMIN — HYDRALAZINE HYDROCHLORIDE 50 MG: 50 TABLET, FILM COATED ORAL at 20:37

## 2023-03-07 RX ADMIN — LORAZEPAM 0.5 MG: 0.5 TABLET ORAL at 09:18

## 2023-03-07 RX ADMIN — DESVENLAFAXINE SUCCINATE 25 MG: 25 TABLET, EXTENDED RELEASE ORAL at 09:19

## 2023-03-07 RX ADMIN — LEVETIRACETAM 250 MG: 250 TABLET, FILM COATED ORAL at 09:18

## 2023-03-07 RX ADMIN — LORAZEPAM 0.5 MG: 0.5 TABLET ORAL at 17:05

## 2023-03-07 RX ADMIN — LEVETIRACETAM 250 MG: 250 TABLET, FILM COATED ORAL at 20:29

## 2023-03-07 RX ADMIN — LORAZEPAM 0.5 MG: 0.5 TABLET ORAL at 02:03

## 2023-03-07 RX ADMIN — FOLIC ACID 1 MG: 1 TABLET ORAL at 09:18

## 2023-03-07 RX ADMIN — SERTRALINE 50 MG: 50 TABLET, FILM COATED ORAL at 09:18

## 2023-03-07 RX ADMIN — HEPARIN SODIUM 5000 UNITS: 5000 INJECTION INTRAVENOUS; SUBCUTANEOUS at 06:54

## 2023-03-07 RX ADMIN — AMLODIPINE BESYLATE 10 MG: 10 TABLET ORAL at 09:18

## 2023-03-07 RX ADMIN — INSULIN LISPRO 3 UNITS: 100 INJECTION, SOLUTION INTRAVENOUS; SUBCUTANEOUS at 12:44

## 2023-03-07 RX ADMIN — ASPIRIN 81 MG: 81 TABLET, COATED ORAL at 09:18

## 2023-03-07 RX ADMIN — LISINOPRIL 20 MG: 20 TABLET ORAL at 20:29

## 2023-03-07 RX ADMIN — ROPINIROLE 2 MG: 2 TABLET, FILM COATED ORAL at 20:29

## 2023-03-07 RX ADMIN — HYDROCODONE BITARTRATE AND ACETAMINOPHEN 1 TABLET: 5; 325 TABLET ORAL at 21:54

## 2023-03-07 RX ADMIN — OLANZAPINE 5 MG: 5 TABLET ORAL at 20:29

## 2023-03-07 RX ADMIN — CARVEDILOL 3.12 MG: 3.12 TABLET, FILM COATED ORAL at 09:17

## 2023-03-07 RX ADMIN — CARVEDILOL 3.12 MG: 3.12 TABLET, FILM COATED ORAL at 17:05

## 2023-03-07 RX ADMIN — Medication 10 ML: at 09:18

## 2023-03-07 RX ADMIN — LEVOTHYROXINE SODIUM 300 MCG: 0.15 TABLET ORAL at 06:55

## 2023-03-07 NOTE — PROGRESS NOTES
Name: Zaina Martinez ADMIT: 3/4/2023   : 1965  PCP: Norman Serrato MD    MRN: [id number] LOS: 0 days   AGE/SEX: 57 y.o. female  [facility]     Subjective   Subjective   Patient lying comfortably in bed.  No melena or hematochezia.  Patient with glucose of 38 yesterday evening.  Asymptomatic.  States that she did not eat much of her lunch/dinner.  Because of personal issues going on at home.  Review of Systems   Constitutional: Negative for chills and fever.   Respiratory: Negative for cough and shortness of breath.    Cardiovascular: Negative for chest pain and leg swelling.   Gastrointestinal: Negative for abdominal pain, diarrhea and nausea.     As above     Objective   Objective   Vital Signs  Temp:  [96.6 °F (35.9 °C)-100.3 °F (37.9 °C)] 100.3 °F (37.9 °C)  Heart Rate:  [82-88] 82  Resp:  [16-18] 16  BP: (138-160)/(42-90) 148/66  SpO2:  [95 %-97 %] 97 %  on   ;   Device (Oxygen Therapy): room air  Body mass index is 22.3 kg/m².  Physical Exam  Constitutional:       General: She is not in acute distress.     Comments: Chronically ill-appearing   HENT:      Head: Normocephalic and atraumatic.   Cardiovascular:      Rate and Rhythm: Normal rate and regular rhythm.      Heart sounds:     No gallop.   Pulmonary:      Effort: Pulmonary effort is normal. No respiratory distress.   Abdominal:      General: Abdomen is flat. There is no distension.      Palpations: Abdomen is soft.      Tenderness: There is no abdominal tenderness.   Musculoskeletal:         General: No swelling or deformity. Normal range of motion.   Skin:     General: Skin is warm and dry.   Neurological:      General: No focal deficit present.      Mental Status: She is alert. Mental status is at baseline.         Results Review     I reviewed the patient's new clinical results.  Results from last 7 days   Lab Units 23  0524 23  0451 23  0527 23  0931   WBC 10*3/mm3 10.49 13.93* 14.44* 21.55*   HEMOGLOBIN g/dL 6.4*  7.0* 7.4* 9.9*   PLATELETS 10*3/mm3 215 238 276 293     Results from last 7 days   Lab Units 03/06/23 0451 03/05/23 0527 03/04/23  1047   SODIUM mmol/L 132* 133* 129*   POTASSIUM mmol/L 4.7 4.0 4.2   CHLORIDE mmol/L 94* 94* 91*   CO2 mmol/L 25.3 31.0* 30.2*   BUN mg/dL 29* 23* 19   CREATININE mg/dL 3.50* 3.07* 2.38*   GLUCOSE mg/dL 143* 22* 143*   Estimated Creatinine Clearance: 15.5 mL/min (A) (by C-G formula based on SCr of 3.5 mg/dL (H)).  Results from last 7 days   Lab Units 03/06/23 0451 03/05/23 0527 03/04/23  1047   ALBUMIN g/dL 2.2* 2.3* 2.7*   BILIRUBIN mg/dL  --   --  0.5   ALK PHOS U/L  --   --  161*   AST (SGOT) U/L  --   --  25   ALT (SGPT) U/L  --   --  5     Results from last 7 days   Lab Units 03/06/23 0451 03/05/23 0527 03/04/23  1047   CALCIUM mg/dL 7.9* 7.9* 8.2*   ALBUMIN g/dL 2.2* 2.3* 2.7*   MAGNESIUM mg/dL 1.6 1.8  --    PHOSPHORUS mg/dL 6.2* 2.0*  --      Results from last 7 days   Lab Units 03/04/23  1047   PROCALCITONIN ng/mL 1.76*     COVID19   Date Value Ref Range Status   02/05/2023 Not Detected Not Detected - Ref. Range Final   02/01/2023 Not Detected Not Detected - Ref. Range Final     Glucose   Date/Time Value Ref Range Status   03/07/2023 1217 235 (H) 70 - 130 mg/dL Final     Comment:     Meter: [id number] : [id number] Jacki SAAVEDRA   03/07/2023 0632 162 (H) 70 - 130 mg/dL Final     Comment:     Meter: [id number] : [id number] Silvano Mckeon NA   03/07/2023 0152 221 (H) 70 - 130 mg/dL Final     Comment:     Meter: [id number] : [id number] Lorenzo DA SILVA RN   03/06/2023 2141 171 (H) 70 - 130 mg/dL Final     Comment:     Meter: [id number] : [id number] Lorenzo DA SILVA RN   03/06/2023 2015 140 (H) 70 - 130 mg/dL Final     Comment:     Meter: [id number] : [id number] Lorenzo DA SILVA RN   03/06/2023 1959 38 (C) 70 - 130 mg/dL Final     Comment:     Confirmed by Repeat Meter: [id number] : [id number] Silvano SAAVEDRA   03/06/2023 1602 71 70 - 130 mg/dL Final      Comment:     Meter: [id number] : [id number] Boone SAAVEDRA       XR Chest 1 View  ONE VIEW PORTABLE CHEST AT 11:25 AM     HISTORY: Hypoglycemia. Leukocytosis. Chronic renal failure.     FINDINGS: The lungs are well-expanded with persistent increased density  in the lower left chest that obscures the diaphragm and consistent with  moderately large left pleural effusion and adjacent dense atelectasis as  also noted on the chest CT scan dated 02/08/2023. This is also similar  to the chest x-ray dated 02/05/2023. The right lung remains clear. The  heart remains enlarged. A large gauge right-sided vascular catheter ends  in the right atrium without change.     This report was finalized on 3/4/2023 11:51 AM by Dr. Ronnie Ritchie M.D.       I reviewed the patient's daily medications.  Scheduled Medications  amLODIPine, 10 mg, Oral, Q24H  aspirin, 81 mg, Oral, Daily  carvedilol, 3.125 mg, Oral, BID With Meals  Desvenlafaxine Succinate ER, 25 mg, Oral, Daily  folic acid, 1 mg, Oral, Daily  heparin (porcine), 5,000 Units, Subcutaneous, Q8H  hydrALAZINE, 50 mg, Oral, TID  insulin lispro, 0-7 Units, Subcutaneous, TID AC  lactulose, 15 mL, Oral, Q12H  levETIRAcetam, 250 mg, Oral, BID  levothyroxine, 300 mcg, Oral, Daily  lisinopril, 20 mg, Oral, Nightly  LORazepam, 0.5 mg, Oral, Q6H  OLANZapine, 5 mg, Oral, BID  pantoprazole, 40 mg, Oral, Daily  rOPINIRole, 2 mg, Oral, Nightly  sertraline, 50 mg, Oral, Daily  sodium chloride, 10 mL, Intravenous, Q12H    Infusions   Diet  Diet: Cardiac Diets, Diabetic Diets, Renal Diets; Healthy Heart (2-3 Na+); Consistent Carbohydrate; Low Sodium (2-3g), Low Potassium, Low Phosphorus; Texture: Regular Texture (IDDSI 7); Fluid Consistency: Thin (IDDSI 0)         I have personally reviewed:  [x]  Laboratory   []  Microbiology   []  Radiology   []  EKG/Telemetry   []  Cardiology/Vascular   []  Pathology   []  Records     Assessment/Plan     Active Hospital Problems    Diagnosis  POA   •  **Hypoglycemia due to type 2 diabetes mellitus ([facility]) [E11.649]  Yes   • Ureteral stent present [Z96.0]  Not Applicable   • Pleural effusion [J90]  Yes   • History of stroke- R MCA s/p TPA with subsequent ICH with debility [Z86.73]  Not Applicable   • Hyponatremia [E87.1]  Yes   • ESRD (end stage renal disease) ([facility]) [N18.6]  Yes   • Abnormal urinalysis [R82.90]  Yes   • Hypothyroidism [E03.9]  Yes   • Rheumatoid arthritis ([facility]) [M06.9]  Yes   • Type 2 diabetes mellitus with kidney complication, with long-term current use of insulin ([facility]) [E11.29, Z79.4]  Not Applicable   • Leukocytosis [D72.829]  Yes   • Anemia [D64.9]  Yes      Resolved Hospital Problems   No resolved problems to display.       57 y.o. female admitted with Hypoglycemia due to type 2 diabetes mellitus ([facility]).    Hypoglycemia  Diabetes type 2  -Had glucose of 38 yesterday evening  -Patient has asymptomatic hypoglycemia so hypoglycemia for her is especially dangerous  -Continue sliding scale insulin only.  Have discussed with nursing and plan on doing sliding scale insulin after meals instead of before, as patient has a habit of not eating most of her meals.  Do not plan on initiating any long-acting insulin on discharge as patient has asymptomatic hypoglycemia down to 19 with only 3 units of Lantus.      Anemia chronic disease  -Hemoglobin of 6.4 today, will receive 1 unit of packed red blood cells  -No obvious signs of bleeding    Leukocytosis, improved, resolved    Abnormal UA  -History of multiple abnormal UAs  -Patient without any signs or symptoms of urinary tract infection    Paroxysmal atrial fibrillation-continue home Coreg and apixaban    ESRD- renal consulted, appreciate recommendations    Hypothyroidism-continue home Synthroid    Seizure disorder-continue home Keppra, renally dressed    Hypertension-continue home Coreg, amlodipine, hydralazine, lisinopril    RLS - continue home requip    Depression - continue home zoloft and  desvenlafaxine    · SCDs for DVT prophylaxis.  · Full code.  · Discussed with patient and nursing staff.  · Anticipate discharge Nursing home once arrangements have been made.      Eddie Givens MD  [facility]  03/07/23  12:57 EST

## 2023-03-07 NOTE — PROGRESS NOTES
[facility] Progress Note      Patient Name: Zaina Martinez  : 1965  MRN: [id number]  Primary Care Physician:  Norman Serrato MD  Date of admission: 3/4/2023    Subjective     Interval History:   HD earlier today; 3 L removed  Appetite is poor, but no N/V  Again reports shortness of breath but better than yesterday      Review of Systems:   As noted above    Objective     Vitals:   Temp:  [96.6 °F (35.9 °C)-98.9 °F (37.2 °C)] 96.9 °F (36.1 °C)  Heart Rate:  [84-90] 88  Resp:  [14-18] 16  BP: (128-160)/(42-90) 160/90    Intake/Output Summary (Last 24 hours) at 3/6/2023 2100  Last data filed at 3/6/2023 2004  Gross per 24 hour   Intake 780 ml   Output 3000 ml   Net -2220 ml       Physical Exam:    Constitutional:  Groggy but more awake than yesterday, frail, chr ill  HEENT: Sclera anicteric, no conjunctival injection, dry MM, periorbital edema  Neck: Supple, no carotid bruit, trachea at midline, no JVD  Respiratory: Coarse with decreased BS in bases, L>R, nonlabored Cardiovascular: RRR, no rub, 2/6M  Vascular: Right chest TDC  Gastrointestinal: BS +, soft, not tender, and nondistended  : No palpable bladder  Musculoskeletal: No significant edema; + clubbing  Psychiatric:  Conversant, groggy, not fully oriented  Neurologic: moving all extremities, minimal speech  Skin: Warm and dry       Scheduled Meds:     amLODIPine, 10 mg, Oral, Q24H  aspirin, 81 mg, Oral, Daily  carvedilol, 3.125 mg, Oral, BID With Meals  Desvenlafaxine Succinate ER, 25 mg, Oral, Daily  folic acid, 1 mg, Oral, Daily  heparin (porcine), 5,000 Units, Subcutaneous, Q8H  hydrALAZINE, 50 mg, Oral, Q12H  insulin lispro, 0-9 Units, Subcutaneous, TID AC  lactulose, 15 mL, Oral, Q12H  levETIRAcetam, 250 mg, Oral, BID  levothyroxine, 300 mcg, Oral, Daily  lisinopril, 20 mg, Oral, Nightly  LORazepam, 0.5 mg, Oral, Q6H  OLANZapine, 5 mg, Oral, BID  pantoprazole, 40 mg, Oral, Daily  rOPINIRole, 2 mg, Oral,  Nightly  sertraline, 50 mg, Oral, Daily  sodium chloride, 10 mL, Intravenous, Q12H      IV Meds:        Results Reviewed:   I have personally reviewed the results from the time of this admission to 3/6/2023 21:00 EST     Results from last 7 days   Lab Units 03/06/23 0451 03/05/23 0527 03/04/23  1047   SODIUM mmol/L 132* 133* 129*   POTASSIUM mmol/L 4.7 4.0 4.2   CHLORIDE mmol/L 94* 94* 91*   CO2 mmol/L 25.3 31.0* 30.2*   BUN mg/dL 29* 23* 19   CREATININE mg/dL 3.50* 3.07* 2.38*   CALCIUM mg/dL 7.9* 7.9* 8.2*   BILIRUBIN mg/dL  --   --  0.5   ALK PHOS U/L  --   --  161*   ALT (SGPT) U/L  --   --  5   AST (SGOT) U/L  --   --  25   GLUCOSE mg/dL 143* 22* 143*       Estimated Creatinine Clearance: 15.3 mL/min (A) (by C-G formula based on SCr of 3.5 mg/dL (H)).    Results from last 7 days   Lab Units 03/06/23 0451 03/05/23  0527   MAGNESIUM mg/dL 1.6 1.8   PHOSPHORUS mg/dL 6.2* 2.0*             Results from last 7 days   Lab Units 03/06/23 0451 03/05/23 0527 03/04/23  0931   WBC 10*3/mm3 13.93* 14.44* 21.55*   HEMOGLOBIN g/dL 7.0* 7.4* 9.9*   PLATELETS 10*3/mm3 238 276 293             Assessment / Plan     ASSESSMENT:  1.  ESRD: hyponatremia with ESRD and chronic lung disease; potassium normal; improving volume excess  2.  IDDM with hypoglycemia  3.  Failure to thrive, with frequent admissions to hospital  4.  PVD with prior stroke  5.  Severe depression  6.  Chronic CHF  7.  Chronic pain syndrome  8.  Atrial fibrillation and flutter on AC  9.  Severe hypothyroidism in the past due to noncompliance: TSH stabilizing    PLAN:  1.  HD MWF  2.  Increase hydralazine to 50 mg TID    Thank you for involving us in the care of Zaina Martinez.  Please feel free to call with any questions.    Alejo Lange MD  03/06/23  21:00 [facility]    [facility]  [phone]    Please note that portions of this note were completed with a voice recognition program.

## 2023-03-07 NOTE — PROGRESS NOTES
[facility] Progress Note      Patient Name: Zaina Martinez  : 1965  MRN: [id number]  Primary Care Physician:  Norman Serrato MD  Date of admission: 3/4/2023    Subjective     Interval History:   Follow up ESRD. One unit PRBC today .  Feels better. Eating supper .    Review of Systems:   As noted above    Objective     Vitals:   Temp:  [96.9 °F (36.1 °C)-100.3 °F (37.9 °C)] 98 °F (36.7 °C)  Heart Rate:  [74-88] 81  Resp:  [16] 16  BP: (126-160)/(58-90) 150/76    Intake/Output Summary (Last 24 hours) at 3/7/2023 1712  Last data filed at 3/7/2023 1445  Gross per 24 hour   Intake 620.33 ml   Output --   Net 620.33 ml       Physical Exam:      General Appearance: Very Chronically ill .    Skin: warm and dry. pale  HEENT:oral mucosa moist.   Neck:RIJ TDC. Tunnel non-tender.   Lungs: decreased bs left lower lobe.   Heart: RRR, no s3 or rub  Abdomen: soft, nontender, nondistended  Extremities: no edema.  Neuro: flat affect. Slow to answer. Moves all 4 ext.  Scheduled Meds:     amLODIPine, 10 mg, Oral, Q24H  aspirin, 81 mg, Oral, Daily  carvedilol, 3.125 mg, Oral, BID With Meals  Desvenlafaxine Succinate ER, 25 mg, Oral, Daily  [START ON 3/8/2023] epoetin brooke/brooke-epbx, 10,000 Units, Intravenous, Once per day on   folic acid, 1 mg, Oral, Daily  hydrALAZINE, 50 mg, Oral, TID  insulin lispro, 0-7 Units, Subcutaneous, TID AC  lactulose, 15 mL, Oral, Q12H  levETIRAcetam, 250 mg, Oral, BID  levothyroxine, 300 mcg, Oral, Daily  lisinopril, 20 mg, Oral, Nightly  LORazepam, 0.5 mg, Oral, Q6H  OLANZapine, 5 mg, Oral, BID  pantoprazole, 40 mg, Oral, Daily  rOPINIRole, 2 mg, Oral, Nightly  sertraline, 50 mg, Oral, Daily  sodium chloride, 10 mL, Intravenous, Q12H      IV Meds:        Results Reviewed:   I have personally reviewed the results from the time of this admission to 3/7/2023 17:12 EST     Results from last 7 days   Lab Units 23  0451 23  0527 23  1047   SODIUM  mmol/L 132* 133* 129*   POTASSIUM mmol/L 4.7 4.0 4.2   CHLORIDE mmol/L 94* 94* 91*   CO2 mmol/L 25.3 31.0* 30.2*   BUN mg/dL 29* 23* 19   CREATININE mg/dL 3.50* 3.07* 2.38*   CALCIUM mg/dL 7.9* 7.9* 8.2*   BILIRUBIN mg/dL  --   --  0.5   ALK PHOS U/L  --   --  161*   ALT (SGPT) U/L  --   --  5   AST (SGOT) U/L  --   --  25   GLUCOSE mg/dL 143* 22* 143*       Estimated Creatinine Clearance: 15.5 mL/min (A) (by C-G formula based on SCr of 3.5 mg/dL (H)).    Results from last 7 days   Lab Units 03/06/23  0451 03/05/23  0527   MAGNESIUM mg/dL 1.6 1.8   PHOSPHORUS mg/dL 6.2* 2.0*             Results from last 7 days   Lab Units 03/07/23  0524 03/06/23  0451 03/05/23  0527 03/04/23  0931   WBC 10*3/mm3 10.49 13.93* 14.44* 21.55*   HEMOGLOBIN g/dL 6.4* 7.0* 7.4* 9.9*   PLATELETS 10*3/mm3 215 238 276 293             Assessment / Plan     ASSESSMENT:  1. ESRD.  HD tomorrow .  2. Failure to thrive,  Multiple hospitalizations.  3. Anemia CKD>  One unit PRBC today. Give another on dialysis tomorrow. Epo with HD.   4. DM2 very labile  5. HTN controlled .  6. Hypothyroid with previously very elevated TSH.  Much improved .  7. Chronic systolic heart failure .  8. Left renal hematoma after left ureteral stent 12/30. Left renal artery embolization 1/1/23. Left stent removal 1/10/23.   PLAN:  1. HD tomorrow.  2. PRBC with HD tomorrow.  3. EPO with HD tomorrow.     Ruth Lira MD  03/07/23  17:12 [facility]    [facility]  [phone]

## 2023-03-07 NOTE — PLAN OF CARE
Goal Outcome Evaluation:           Progress: no change  Outcome Evaluation: VSS. Hgb 6.4 and 1 unit or RBC transfused today. New IV placed in R arm after previous 2 infiltrated or occluded. Dialysis scheduled for tomorrow and another unit of RBCs to be given then. Pt incontinent of bowel this shift. New mepilex placed to coccyx redness. Pt in and out of confusion and very sleepy most of the day. SSI adjusted by LHA and BG above 80 throughout shift. Contact precautions maintained. Pt more alert for dinner and eating meal. Nutrisource renal X 2 given to pt and she drank both.

## 2023-03-07 NOTE — PLAN OF CARE
"  Problem: Adult Inpatient Plan of Care  Goal: Plan of Care Review  Outcome: Ongoing, Progressing  Flowsheets (Taken 3/7/2023 0435)  Progress: no change  Plan of Care Reviewed With: patient  Outcome Evaluation: Beginning of the shift, Pt was confused, disoriented to place, time and situation, but did recognize her  , blood sugar was checked and was 38, D50 25 ml given IV, Pt also drank a box of Boost, BS rechecked and was up to 140, still mildly confused, reoriented, falls precaution maintained, incontinence care done, still with watery loose stool, Pt slept after taking her night time meds, at around 02 Pt was heard calling out for a \"Delfin\" she said is her grandson and that he needs to get ready for school, Pt awake, anxious, restless, without any of her coovers and hospital gown, confused and disoriented, PRN Ativan given, had another loose stool, pericare done, redness to coccyx area noted still blanchable, barrier cream and mepilex applied for cushion, repositioned, will continue to monitor, no c/o shortness of air,very pale looking, yesterday's Hgb 7, have labs ordered today, dialysis cath CDI, for further care   Goal Outcome Evaluation:  Plan of Care Reviewed With: patient        Progress: no change  Outcome Evaluation: Beginning of the shift, Pt was confused, disoriented to place, time and situation, but did recognize her  , blood sugar was checked and was 38, D50 25 ml given IV, Pt also drank a box of Boost, BS rechecked and was up to 140, still mildly confused, reoriented, falls precaution maintained, incontinence care done, still with watery loose stool, Pt slept after taking her night time meds, at around 02 Pt was heard calling out for a \"Delfin\" she said is her grandson and that he needs to get ready for school, Pt awake, anxious, restless, without any of her coovers and hospital gown, confused and disoriented, PRN Ativan given, had another loose stool, pericare done, redness to coccyx " area noted still blanchable, barrier cream and mepilex applied for cushion, repositioned, will continue to monitor, no c/o shortness of air,very pale looking, yesterday's Hgb 7, have labs ordered today, dialysis cath CDI, for further care

## 2023-03-07 NOTE — PROGRESS NOTES
Nutrition Services    Patient Name:  Zaina Martinez  YOB: 1965  MRN: [id number]  Admit Date:  3/4/2023    Assessment Date:  03/07/23    Comment: Visited patient at bedside who reports her appetite is coming back slowly. She reports weight loss over the last year, UTD how much due to not knowing her dry weight but she reports she used to weigh 140#. Patient had dialysis yesterday, 3L removed, weight went from 135# to 121# in one day. RD ordered novasource renal TID due to patient not eating well. RD observed breakfast tray which she had not eaten any. Will continue to follow     Patient meets ASPEN/AND criteria for nutrition dx of moderate malnutrition of chronic disease based on energy intake, muscle/fat loss    CLINICAL NUTRITION ASSESSMENT      Reason for Assessment MST score 2+     Diagnosis/Problem   hypoglycemia due to type 2 DM    Medical/Surgical History Past Medical History:   Diagnosis Date   • Acute CVA (cerebrovascular accident) ([facility]) 05/01/2022   • Acute on chronic diastolic CHF (congestive heart failure) ([facility])    • Anemia    • CAD (coronary artery disease) 12/20/2021   • Diabetes ([facility])    • Disease of thyroid gland    • GERD (gastroesophageal reflux disease)    • History of intracranial hemorrhage 5/1/2022   • Hyperlipidemia 11/30/2018   • Hypertension    • Rheumatoid arthritis ([facility])    • Stage 5 chronic kidney disease ([facility])        Past Surgical History:   Procedure Laterality Date   • CHOLECYSTECTOMY WITH INTRAOPERATIVE CHOLANGIOGRAM N/A 1/10/2022    Procedure: Laparoscopic cholecystectomy with intraoperative cholangiogram;  Surgeon: Ramana Raygoza MD;  Location: [facility];  Service: General;  Laterality: N/A;   • CYSTOSCOPY W/ URETERAL STENT PLACEMENT Left 9/29/2022    Procedure: CYSTOSCOPY URETERAL STENT INSERTION WITH RETROGRADE PYELOGRAM;  Surgeon: Bryant Phan Jr., MD;  Location: [facility] OR;  Service: Urology;  Laterality: Left;   • CYSTOSCOPY W/ URETERAL STENT  PLACEMENT Left 1/10/2023    Procedure: CYSTOSCOPY LEFT STENT REMOVAL;  Surgeon: Bryant Phan Jr., MD;  Location: [facility];  Service: Urology;  Laterality: Left;   • EMBOLIZATION MESENTERIC ARTERY N/A 1/1/2023    Procedure: LEFT KIDNEY EMBOLIZATION;  Surgeon: Bijan Ordoñez MD;  Location: [facility] OR 18/19;  Service: Interventional Radiology;  Laterality: N/A;   • EYE SURGERY     • FEMUR IM NAILING/RODDING Right 11/10/2022    Procedure: FEMUR INTRAMEDULLARY NAILING/RODDING;  Surgeon: Glen Pierce MD;  Location: [facility];  Service: Orthopedics;  Laterality: Right;   • HIP INTERTROCHANTERIC NAILING Right 11/10/2022    Procedure: HIP INTERTROCHANTERIC NAILING;  Surgeon: Glen Pierce MD;  Location: [facility];  Service: Orthopedics;  Laterality: Right;   • HYSTERECTOMY     • INSERT CENTRAL LINE AT BEDSIDE  12/31/2022        • INSERTION HEMODIALYSIS CATHETER N/A 12/6/2021    Procedure: HEMODIALYSIS CATHETER INSERTION;  Surgeon: Keli Salazar MD;  Location: [facility] OR 18/19;  Service: Vascular;  Laterality: N/A;   • INSERTION HEMODIALYSIS CATHETER N/A 5/3/2022    Procedure: TUNNELED CATHETER PLACEMENT;  Surgeon: Keli Salazar MD;  Location: [facility];  Service: Vascular;  Laterality: N/A;   • MUSCLE BIOPSY Left 6/15/2022    Procedure: Left quadriceps muscle biopsy;  Surgeon: Marques Ma MD;  Location: [facility];  Service: Neurosurgery;  Laterality: Left;   • URETEROSCOPY LASER LITHOTRIPSY WITH STENT INSERTION Left 12/30/2022    Procedure: CYSTOSCOPY WITH LEFT  URETEROSCOPY  LEFT STENT EXCHANGE;  Surgeon: Bryant Phan Jr., MD;  Location: [facility];  Service: Urology;  Laterality: Left;        Encounter Information        Nutrition History:  Visited patient at bedside who reports her appetite is coming back slowly. She reports weight loss over the last year, UTD how much due to not knowing her dry weight but she reports  "she used to weigh 140#. Patient had dialysis yesterday, 3L removed, weight went from 135# to 121# in one day. RD ordered novasource renal TID due to patient not eating well. RD observed breakfast tray which she had not eaten any. Will continue to follow    Food Preferences:    Supplements:    Factors Affecting Intake: decreased appetite     Anthropometrics        Current Height  Current Weight  BMI kg/m2 Height: 157.5 cm (62\")  Weight: 55.3 kg (121 lb 14.6 oz) (03/07/23 0636)  Body mass index is 22.3 kg/m².   Adjusted BMI (if applicable)        Admission Weight 129# (58.8 kg)        Ideal Body Weight (IBW) 110# (50.1 kg)    Adjusted IBW (if applicable)        Usual Body Weight (UBW) 140#    Weight Change/Trend Unknown due to fluid shifts r/t ESRD and HD        Weight History Wt Readings from Last 30 Encounters:   03/07/23 0636 55.3 kg (121 lb 14.6 oz)   03/06/23 0554 61.6 kg (135 lb 12.9 oz)   03/04/23 1507 58.8 kg (129 lb 10.1 oz)   03/04/23 0901 67.2 kg (148 lb 2.4 oz)   02/08/23 0645 5.2 kg (11 lb 7.4 oz)   02/04/23 2356 59.2 kg (130 lb 8.2 oz)   02/01/23 1843 59.1 kg (130 lb 3.2 oz)   02/01/23 0951 62.4 kg (137 lb 8 oz)   01/29/23 0600 54.8 kg (120 lb 13 oz)   01/28/23 0147 56.8 kg (125 lb 3.5 oz)   01/27/23 1100 48.7 kg (107 lb 5.8 oz)   01/27/23 0634 56.3 kg (124 lb 1.9 oz)   01/26/23 1200 50.2 kg (110 lb 10.7 oz)   01/26/23 0609 56.9 kg (125 lb 8 oz)   01/25/23 0504 52.1 kg (114 lb 13.8 oz)   01/24/23 0534 51.4 kg (113 lb 6.4 oz)   01/23/23 1701 51.7 kg (114 lb)   01/23/23 0629 51.8 kg (114 lb 3.2 oz)   01/22/23 0500 57.4 kg (126 lb 8.7 oz)   01/21/23 0500 56.8 kg (125 lb 3.5 oz)   01/20/23 0600 56 kg (123 lb 7.3 oz)   01/19/23 0530 61.2 kg (135 lb)   01/18/23 0627 63.4 kg (139 lb 12.4 oz)   01/17/23 0500 64 kg (141 lb 1.5 oz)   01/16/23 0300 63.9 kg (140 lb 14 oz)   01/12/23 1500 68.2 kg (150 lb 5.7 oz)   01/12/23 0534 69 kg (152 lb 1.9 oz)   01/11/23 1244 68.1 kg (150 lb 2.1 oz)   01/10/23 0640 73.2 kg (161 " lb 6 oz)   01/09/23 0507 74.1 kg (163 lb 5.8 oz)   01/08/23 0658 71.2 kg (156 lb 15.5 oz)   01/07/23 0500 70.8 kg (156 lb 1.4 oz)   01/06/23 0610 73.3 kg (161 lb 9.6 oz)   01/05/23 0508 68.6 kg (151 lb 3.8 oz)   01/02/23 0900 65.8 kg (145 lb 1 oz)   01/01/23 0600 64.6 kg (142 lb 6.7 oz)   12/26/22 1947 59.3 kg (130 lb 12.8 oz)   12/09/22 0517 57.7 kg (127 lb 3.3 oz)   12/08/22 0549 27 kg (59 lb 8 oz)   12/04/22 1934 57.4 kg (126 lb 8.7 oz)   11/23/22 1156 57.4 kg (126 lb 8.7 oz)   11/23/22 0443 60.4 kg (133 lb 2.5 oz)   11/22/22 0606 55.6 kg (122 lb 9.2 oz)   11/21/22 1200 58.3 kg (128 lb 8.5 oz)   11/20/22 0337 61.2 kg (134 lb 14.7 oz)   11/19/22 0456 60.1 kg (132 lb 7.9 oz)   11/18/22 0458 61.3 kg (135 lb 2.3 oz)   11/17/22 0418 58.4 kg (128 lb 12 oz)   11/16/22 0400 59.7 kg (131 lb 11.2 oz)   11/16/22 0044 59.8 kg (131 lb 12.8 oz)   11/15/22 0228 55.9 kg (123 lb 3.8 oz)   11/14/22 0600 63.3 kg (139 lb 8.8 oz)   11/13/22 0722 60.5 kg (133 lb 6.1 oz)   11/13/22 0554 60.5 kg (133 lb 6.4 oz)   11/11/22 1200 59 kg (130 lb 1.1 oz)   11/11/22 0700 58.5 kg (128 lb 14.4 oz)   11/09/22 0424 57.7 kg (127 lb 3.3 oz)   11/01/22 0833 54.1 kg (119 lb 4.3 oz)   11/01/22 0643 55.8 kg (123 lb 0.3 oz)   10/31/22 0504 52.5 kg (115 lb 11.9 oz)   10/30/22 0500 56.3 kg (124 lb 1.9 oz)   10/29/22 0625 54.9 kg (121 lb 0.5 oz)   10/28/22 0500 56.8 kg (125 lb 3.5 oz)   10/27/22 0601 57.2 kg (126 lb)   10/17/22 0500 57.6 kg (126 lb 15.8 oz)   10/12/22 2204 56.6 kg (124 lb 12.5 oz)   10/06/22 0511 56.4 kg (124 lb 4.8 oz)   10/05/22 0300 57.1 kg (125 lb 14.1 oz)   10/04/22 1516 54.2 kg (119 lb 8 oz)   10/04/22 0530 58 kg (127 lb 12.8 oz)   10/03/22 0500 57.7 kg (127 lb 3.3 oz)   10/02/22 0556 56.2 kg (123 lb 14.4 oz)   10/01/22 0500 55.2 kg (121 lb 12.8 oz)   09/30/22 1114 61 kg (134 lb 7.7 oz)   09/30/22 0551 64.5 kg (142 lb 3.2 oz)   09/29/22 0513 63 kg (138 lb 14.2 oz)   09/28/22 0615 59.8 kg (131 lb 13.4 oz)   09/27/22 1455 52.8 kg (116  lb 6.5 oz)   09/27/22 0600 56.3 kg (124 lb 1.9 oz)   09/27/22 0503 56.3 kg (124 lb 1.9 oz)   09/26/22 2305 59 kg (130 lb)   09/13/22 0500 60 kg (132 lb 4.4 oz)   09/11/22 0548 59.8 kg (131 lb 14.4 oz)   09/10/22 0530 56.7 kg (125 lb 1.6 oz)   09/09/22 0500 55.4 kg (122 lb 1.6 oz)   09/08/22 0511 59.5 kg (131 lb 3.2 oz)   09/07/22 1230 54.5 kg (120 lb 2.4 oz)   09/07/22 0640 56.2 kg (124 lb)   09/06/22 0539 55.5 kg (122 lb 6.4 oz)   09/05/22 0505 58.4 kg (128 lb 12.8 oz)   09/04/22 0500 62.6 kg (138 lb 1.6 oz)   09/03/22 1708 64.9 kg (143 lb 1.3 oz)   09/03/22 1355 64.9 kg (143 lb 1.3 oz)   08/27/22 0527 64.9 kg (143 lb 1.6 oz)   08/26/22 0942 64.9 kg (143 lb)   08/26/22 0500 66.6 kg (146 lb 12.8 oz)   08/25/22 0810 67 kg (147 lb 11.3 oz)   08/25/22 0500 67.1 kg (147 lb 14.4 oz)   08/24/22 0518 67.7 kg (149 lb 4 oz)   08/23/22 0526 66.8 kg (147 lb 4.3 oz)   08/22/22 0521 67.5 kg (148 lb 14.4 oz)   08/21/22 0846 65.3 kg (144 lb)   08/21/22 0350 65.4 kg (144 lb 3.2 oz)   08/20/22 0516 65 kg (143 lb 3.2 oz)   08/04/22 0511 65.1 kg (143 lb 8.3 oz)   08/03/22 0504 66.8 kg (147 lb 4.3 oz)   08/02/22 0602 65.4 kg (144 lb 2.9 oz)   08/01/22 1557 62.5 kg (137 lb 12.6 oz)   08/01/22 0700 68.3 kg (150 lb 9.2 oz)   07/31/22 0500 64.3 kg (141 lb 12.1 oz)   07/30/22 0638 65.2 kg (143 lb 11.8 oz)   07/29/22 1532 66.7 kg (147 lb)   07/29/22 0548 66.7 kg (147 lb 0.8 oz)   07/28/22 0610 65 kg (143 lb 4.8 oz)   07/27/22 2055 65.6 kg (144 lb 9.6 oz)   07/22/22 1542 66.3 kg (146 lb 2.6 oz)   07/22/22 0632 69.3 kg (152 lb 12.5 oz)   07/21/22 0539 69.7 kg (153 lb 10.6 oz)   07/18/22 0842 71.1 kg (156 lb 11.2 oz)   07/17/22 0549 68.6 kg (151 lb 3.8 oz)   07/16/22 0633 67 kg (147 lb 12.8 oz)   07/14/22 1738 69.9 kg (154 lb)   07/13/22 1041 61.7 kg (136 lb 1.6 oz)   07/04/22 1756 75.5 kg (166 lb 7.2 oz)   07/01/22 1700 73.9 kg (163 lb)   06/15/22 1100 62.5 kg (137 lb 12.6 oz)   06/12/22 0743 66.2 kg (145 lb 15.1 oz)   05/26/22 1700 64.9 kg  (143 lb 1.3 oz)   05/24/22 1726 64.5 kg (142 lb 3.2 oz)   05/16/22 0817 54.2 kg (119 lb 7.8 oz)   05/13/22 1911 54.2 kg (119 lb 7.8 oz)   05/13/22 1300 51 kg (112 lb 7 oz)   05/13/22 0700 54.7 kg (120 lb 8 oz)   05/12/22 0514 55.9 kg (123 lb 3.8 oz)   05/11/22 0535 54.1 kg (119 lb 4.3 oz)   05/10/22 0512 52.9 kg (116 lb 9.6 oz)   05/09/22 0535 56.3 kg (124 lb 3.2 oz)   05/08/22 0614 54.7 kg (120 lb 9.5 oz)   05/07/22 0552 48.2 kg (106 lb 4.2 oz)   05/06/22 0500 46 kg (101 lb 6.4 oz)   05/05/22 1154 46.2 kg (101 lb 13.6 oz)   05/05/22 0647 54.5 kg (120 lb 2.4 oz)   05/04/22 0300 57.2 kg (126 lb 1.7 oz)   05/03/22 0520 59.9 kg (132 lb 0.9 oz)   05/01/22 1135 57.8 kg (127 lb 6.8 oz)   05/01/22 0600 57.8 kg (127 lb 6.8 oz)   04/29/22 1900 50.3 kg (110 lb 14.3 oz)   04/29/22 0519 50.3 kg (110 lb 12.8 oz)   04/28/22 0558 55.9 kg (123 lb 3.8 oz)   04/28/22 0427 54.8 kg (120 lb 13 oz)   04/28/22 1511 55.8 kg (123 lb)   03/02/22 1615 62.4 kg (137 lb 9.1 oz)   03/02/22 0020 64.4 kg (141 lb 15.6 oz)   03/01/22 0434 62 kg (136 lb 11.2 oz)   02/28/22 0413 62.2 kg (137 lb 3.2 oz)   02/27/22 0644 64.1 kg (141 lb 5 oz)   02/26/22 0500 65.1 kg (143 lb 8.3 oz)   02/25/22 0500 65.4 kg (144 lb 2.9 oz)   02/24/22 0500 65.9 kg (145 lb 4.5 oz)   02/23/22 2300 64.9 kg (143 lb 1.3 oz)   01/24/22 0814 71.4 kg (157 lb 6.4 oz)   01/15/22 0500 71.2 kg (156 lb 15.5 oz)   01/14/22 0500 69.6 kg (153 lb 7 oz)   01/13/22 0500 69.7 kg (153 lb 10.6 oz)   01/12/22 0500 69.9 kg (154 lb 1.6 oz)   01/11/22 0500 86.3 kg (190 lb 4.1 oz)   01/10/22 0700 82.3 kg (181 lb 8 oz)   01/09/22 1808 77.6 kg (171 lb)   01/09/22 1006 79.4 kg (175 lb)   12/29/21 0508 71.8 kg (158 lb 3.2 oz)   12/28/21 1251 73.7 kg (162 lb 7.7 oz)   12/28/21 0513 73.7 kg (162 lb 7.7 oz)   12/27/21 0500 67.7 kg (149 lb 3.2 oz)   12/26/21 1053 70.7 kg (155 lb 13.8 oz)   12/26/21 0500 73.8 kg (162 lb 9.6 oz)   12/25/21 0457 72.3 kg (159 lb 4.8 oz)   12/24/21 0500 72.6 kg (160 lb 1.6 oz)  "  12/23/21 0500 75.9 kg (167 lb 6.4 oz)   12/22/21 0500 73.9 kg (163 lb)   12/20/21 1100 70.3 kg (155 lb)   12/19/21 1338 69.9 kg (154 lb)   12/17/21 0449 69.9 kg (154 lb)   12/16/21 1300 70.2 kg (154 lb 12.2 oz)   12/16/21 0306 72.3 kg (159 lb 4.8 oz)   12/15/21 1531 70.2 kg (154 lb 12.8 oz)   12/01/21 2300 61.2 kg (135 lb)   11/30/21 1252 59 kg (130 lb)   11/30/21 0200 59 kg (130 lb)           --  Estimated/Assessed Needs       Energy Requirements    Height for Calculation  Height: 157.5 cm (62\")   Weight for Calculation 121# (55.3 kg)    Method for Estimation  25 kcal/kg, 30 kcal/kg   EST Needs (kcal/day) 4714-0282       Protein Requirements    Weight for Calculation 121# (55.3 kg)    EST Protein Needs (g/kg) 1.0 gm/kg, 1.3 gm/kg   EST Daily Needs (g/day) 55-72       Fluid Requirements     Method for Estimation Defer to physician    Estimated Needs (mL/day)        Fluid Deficit    Current Na Level (mEq/L)    Desired Na Level (mEq/L)    Estimated Fluid Deficit (L)       Tests/Procedures        Tests/Procedures No new tests/procedures     Labs       Pertinent Labs    Results from last 7 days   Lab Units 03/06/23  0451 03/05/23  0527 03/04/23  1047   SODIUM mmol/L 132* 133* 129*   POTASSIUM mmol/L 4.7 4.0 4.2   CHLORIDE mmol/L 94* 94* 91*   CO2 mmol/L 25.3 31.0* 30.2*   BUN mg/dL 29* 23* 19   CREATININE mg/dL 3.50* 3.07* 2.38*   CALCIUM mg/dL 7.9* 7.9* 8.2*   BILIRUBIN mg/dL  --   --  0.5   ALK PHOS U/L  --   --  161*   ALT (SGPT) U/L  --   --  5   AST (SGOT) U/L  --   --  25   GLUCOSE mg/dL 143* 22* 143*     Results from last 7 days   Lab Units 03/07/23 0524 03/06/23 0451 03/05/23 0527   MAGNESIUM mg/dL  --  1.6 1.8   PHOSPHORUS mg/dL  --  6.2* 2.0*   HEMOGLOBIN g/dL 6.4* 7.0* 7.4*   HEMATOCRIT % 20.8* 22.7* 24.7*   WBC 10*3/mm3 10.49 13.93* 14.44*   ALBUMIN g/dL  --  2.2* 2.3*     Results from last 7 days   Lab Units 03/07/23  0524 03/06/23 0451 03/05/23  0527 03/04/23  0931   PLATELETS 10*3/mm3 215 238 276 " 293     COVID19   Date Value Ref Range Status   02/05/2023 Not Detected Not Detected - Ref. Range Final     Lab Results   Component Value Date    HGBA1C 6.20 (H) 02/02/2023          Medications           Scheduled Medications amLODIPine, 10 mg, Oral, Q24H  aspirin, 81 mg, Oral, Daily  carvedilol, 3.125 mg, Oral, BID With Meals  Desvenlafaxine Succinate ER, 25 mg, Oral, Daily  folic acid, 1 mg, Oral, Daily  heparin (porcine), 5,000 Units, Subcutaneous, Q8H  hydrALAZINE, 50 mg, Oral, TID  insulin lispro, 0-7 Units, Subcutaneous, TID AC  lactulose, 15 mL, Oral, Q12H  levETIRAcetam, 250 mg, Oral, BID  levothyroxine, 300 mcg, Oral, Daily  lisinopril, 20 mg, Oral, Nightly  LORazepam, 0.5 mg, Oral, Q6H  OLANZapine, 5 mg, Oral, BID  pantoprazole, 40 mg, Oral, Daily  rOPINIRole, 2 mg, Oral, Nightly  sertraline, 50 mg, Oral, Daily  sodium chloride, 10 mL, Intravenous, Q12H       Infusions     PRN Medications •  acetaminophen  •  dextrose  •  dextrose  •  glucagon (human recombinant)  •  HYDROcodone-acetaminophen  •  LORazepam  •  melatonin  •  ondansetron  •  sennosides-docusate  •  sodium chloride  •  sodium chloride     Physical Findings          Physical Appearance alert, loss of muscle mass, loss of subcutaneous fat, oriented   Oral/Mouth Cavity dental caries   Edema  no edema   Gastrointestinal diarrhea, last bowel movement:3/7   Skin  skin intact   Tubes/Drains none   NFPE Consented to exam, See Malnutrition Severity Assessment   --  Malnutrition Severity Assessment      Patient meets criteria for : Moderate (non-severe) Malnutrition (Patient meets ASPEN/AND criteria for nutrition dx of moderate malnutrition of chronic disease based on energy intake, muscle/fat loss)  Malnutrition Type (last 8 hours)     Malnutrition Severity Assessment     Row Name 03/07/23 1057       Malnutrition Severity Assessment    Malnutrition Type Chronic Disease - Related Malnutrition    Row Name 03/07/23 1057       Insufficient Energy Intake      Insufficient Energy Intake Findings Severe    Insufficient Energy Intake  <75% of est. energy requirement for > or equal to 1 month    Row Name 03/07/23 1057       Muscle Loss    Temple Region Moderate - slight depression    Clavicle Bone Region None    Acromion Bone Region Moderate - acromion may slightly protrude    Dorsal Hand Region Moderate - slight depression    Patellar Region Severe - prominent bone, square looking, very little muscle definition    Anterior Thigh Region Severe - line/depression along thigh, obviously thin    Posterior Calf Region Severe - thin with very little definition/firmness    Row Name 03/07/23 1057       Fat Loss    Orbital Region  None    Upper Arm Region Severe - mostly skin, very little space between folds, fingers touch    Row Name 03/07/23 1057       Criteria Met (Must meet criteria for severity in at least 2 of these categories: M Wasting, Fat Loss, Fluid, Secondary Signs, Wt. Status, Intake)    Patient meets criteria for  Moderate (non-severe) Malnutrition  Patient meets ASPEN/AND criteria for nutrition dx of moderate malnutrition of chronic disease based on energy intake, muscle/fat loss                   Current Nutrition Orders & Evaluation of Intake       Oral Nutrition     Food Allergies NKFA   Current PO Diet Diet: Cardiac Diets, Diabetic Diets, Renal Diets; Healthy Heart (2-3 Na+); Consistent Carbohydrate; Low Sodium (2-3g), Low Potassium, Low Phosphorus; Texture: Regular Texture (IDDSI 7); Fluid Consistency: Thin (IDDSI 0)   Supplement Novasource Renal , TID   PO Evaluation     % PO Intake Minimal     # of Days Evaluated 1   --  PES STATEMENT / NUTRITION DIAGNOSIS      Nutrition Dx Problem  Problem: Inadequate Nutrient Intake  Etiology: Medical Diagnosis ESRD, DM2   Signs/Symptoms: Report of Minimal PO Intake, NFPE Results and Unintended Weight Change    Comment:    --  NUTRITION INTERVENTION / PLAN OF CARE      Intervention Goal(s) Reduce/improve symptoms, Meet  estimated needs, Disease management/therapy, Tolerate PO , Increase intake and Maintain weight         RD Intervention/Action Interview for preferences, Supplement provided, Encourage intake, Follow Tx Progress and Care plan reviewed         Prescription/Orders:       PO Diet       Supplements       Snacks       Enteral Nutrition       Parenteral Nutrition    New Prescription Ordered? Continue same per protocol   --      Monitor/Evaluation Per protocol, I&O, PO intake, Supplement intake, Pertinent labs, Weight, Skin status, GI status, Symptoms, POC/GOC   Discharge Plan/Needs No discharge needs identified at this time   Education Will instruct as appropriate   --    RD to follow per protocol.      Electronically signed by:  Radha Pinzon RD  03/07/23 11:53 EST

## 2023-03-08 PROBLEM — E44.0 MODERATE MALNUTRITION: Status: ACTIVE | Noted: 2023-03-08

## 2023-03-08 LAB
ALBUMIN SERPL-MCNC: 2.2 G/DL (ref 3.5–5.2)
ANION GAP SERPL CALCULATED.3IONS-SCNC: 7.5 MMOL/L (ref 5–15)
BASOPHILS # BLD AUTO: 0.03 10*3/MM3 (ref 0–0.2)
BASOPHILS NFR BLD AUTO: 0.2 % (ref 0–1.5)
BH BB BLOOD EXPIRATION DATE: NORMAL
BH BB BLOOD TYPE BARCODE: 5100
BH BB DISPENSE STATUS: NORMAL
BH BB PRODUCT CODE: NORMAL
BH BB UNIT NUMBER: NORMAL
BUN SERPL-MCNC: 29 MG/DL (ref 6–20)
BUN/CREAT SERPL: 10.1 (ref 7–25)
CALCIUM SPEC-SCNC: 8.1 MG/DL (ref 8.6–10.5)
CHLORIDE SERPL-SCNC: 99 MMOL/L (ref 98–107)
CO2 SERPL-SCNC: 25.5 MMOL/L (ref 22–29)
CREAT SERPL-MCNC: 2.87 MG/DL (ref 0.57–1)
CROSSMATCH INTERPRETATION: NORMAL
DEPRECATED RDW RBC AUTO: 50.2 FL (ref 37–54)
EGFRCR SERPLBLD CKD-EPI 2021: 18.6 ML/MIN/1.73
EOSINOPHIL # BLD AUTO: 0.01 10*3/MM3 (ref 0–0.4)
EOSINOPHIL NFR BLD AUTO: 0.1 % (ref 0.3–6.2)
ERYTHROCYTE [DISTWIDTH] IN BLOOD BY AUTOMATED COUNT: 16.8 % (ref 12.3–15.4)
GLUCOSE BLDC GLUCOMTR-MCNC: 127 MG/DL (ref 70–130)
GLUCOSE BLDC GLUCOMTR-MCNC: 158 MG/DL (ref 70–130)
GLUCOSE BLDC GLUCOMTR-MCNC: 184 MG/DL (ref 70–130)
GLUCOSE BLDC GLUCOMTR-MCNC: 227 MG/DL (ref 70–130)
GLUCOSE SERPL-MCNC: 132 MG/DL (ref 65–99)
HCT VFR BLD AUTO: 22.8 % (ref 34–46.6)
HGB BLD-MCNC: 7 G/DL (ref 12–15.9)
IMM GRANULOCYTES # BLD AUTO: 0.07 10*3/MM3 (ref 0–0.05)
IMM GRANULOCYTES NFR BLD AUTO: 0.6 % (ref 0–0.5)
LYMPHOCYTES # BLD AUTO: 1.46 10*3/MM3 (ref 0.7–3.1)
LYMPHOCYTES NFR BLD AUTO: 12 % (ref 19.6–45.3)
MCH RBC QN AUTO: 25.2 PG (ref 26.6–33)
MCHC RBC AUTO-ENTMCNC: 30.7 G/DL (ref 31.5–35.7)
MCV RBC AUTO: 82 FL (ref 79–97)
MONOCYTES # BLD AUTO: 0.75 10*3/MM3 (ref 0.1–0.9)
MONOCYTES NFR BLD AUTO: 6.2 % (ref 5–12)
NEUTROPHILS NFR BLD AUTO: 80.9 % (ref 42.7–76)
NEUTROPHILS NFR BLD AUTO: 9.83 10*3/MM3 (ref 1.7–7)
NRBC BLD AUTO-RTO: 0 /100 WBC (ref 0–0.2)
PHOSPHATE SERPL-MCNC: 0.7 MG/DL (ref 2.5–4.5)
PLATELET # BLD AUTO: 252 10*3/MM3 (ref 140–450)
PMV BLD AUTO: 9.5 FL (ref 6–12)
POTASSIUM SERPL-SCNC: 4.5 MMOL/L (ref 3.5–5.2)
RBC # BLD AUTO: 2.78 10*6/MM3 (ref 3.77–5.28)
SODIUM SERPL-SCNC: 132 MMOL/L (ref 136–145)
UNIT  ABO: NORMAL
UNIT  RH: NORMAL
WBC NRBC COR # BLD: 12.15 10*3/MM3 (ref 3.4–10.8)

## 2023-03-08 PROCEDURE — G0378 HOSPITAL OBSERVATION PER HR: HCPCS

## 2023-03-08 PROCEDURE — P9016 RBC LEUKOCYTES REDUCED: HCPCS

## 2023-03-08 PROCEDURE — 96365 THER/PROPH/DIAG IV INF INIT: CPT

## 2023-03-08 PROCEDURE — 36430 TRANSFUSION BLD/BLD COMPNT: CPT

## 2023-03-08 PROCEDURE — 82962 GLUCOSE BLOOD TEST: CPT

## 2023-03-08 PROCEDURE — G0257 UNSCHED DIALYSIS ESRD PT HOS: HCPCS

## 2023-03-08 PROCEDURE — 96366 THER/PROPH/DIAG IV INF ADDON: CPT

## 2023-03-08 PROCEDURE — 63710000001 INSULIN LISPRO (HUMAN) PER 5 UNITS: Performed by: STUDENT IN AN ORGANIZED HEALTH CARE EDUCATION/TRAINING PROGRAM

## 2023-03-08 PROCEDURE — 85025 COMPLETE CBC W/AUTO DIFF WBC: CPT | Performed by: STUDENT IN AN ORGANIZED HEALTH CARE EDUCATION/TRAINING PROGRAM

## 2023-03-08 PROCEDURE — 80069 RENAL FUNCTION PANEL: CPT | Performed by: INTERNAL MEDICINE

## 2023-03-08 PROCEDURE — 86900 BLOOD TYPING SEROLOGIC ABO: CPT

## 2023-03-08 RX ORDER — LORAZEPAM 0.5 MG/1
0.5 TABLET ORAL EVERY 6 HOURS PRN
Status: DISCONTINUED | OUTPATIENT
Start: 2023-03-08 | End: 2023-03-11 | Stop reason: HOSPADM

## 2023-03-08 RX ORDER — LACTULOSE 10 G/15ML
15 SOLUTION ORAL DAILY PRN
Status: DISCONTINUED | OUTPATIENT
Start: 2023-03-08 | End: 2023-03-11 | Stop reason: HOSPADM

## 2023-03-08 RX ORDER — LACTULOSE 10 G/15ML
15 SOLUTION ORAL DAILY
Status: DISCONTINUED | OUTPATIENT
Start: 2023-03-08 | End: 2023-03-08

## 2023-03-08 RX ADMIN — Medication 10 ML: at 13:21

## 2023-03-08 RX ADMIN — LEVETIRACETAM 250 MG: 250 TABLET, FILM COATED ORAL at 09:48

## 2023-03-08 RX ADMIN — ROPINIROLE 2 MG: 2 TABLET, FILM COATED ORAL at 21:26

## 2023-03-08 RX ADMIN — FOLIC ACID 1 MG: 1 TABLET ORAL at 09:48

## 2023-03-08 RX ADMIN — CARVEDILOL 3.12 MG: 3.12 TABLET, FILM COATED ORAL at 09:49

## 2023-03-08 RX ADMIN — SERTRALINE 50 MG: 50 TABLET, FILM COATED ORAL at 09:48

## 2023-03-08 RX ADMIN — PANTOPRAZOLE SODIUM 40 MG: 40 TABLET, DELAYED RELEASE ORAL at 06:39

## 2023-03-08 RX ADMIN — HYDRALAZINE HYDROCHLORIDE 50 MG: 50 TABLET, FILM COATED ORAL at 21:26

## 2023-03-08 RX ADMIN — SODIUM PHOSPHATE, MONOBASIC, MONOHYDRATE AND SODIUM PHOSPHATE, DIBASIC, ANHYDROUS 15 MMOL: 276; 142 INJECTION, SOLUTION INTRAVENOUS at 13:09

## 2023-03-08 RX ADMIN — LEVOTHYROXINE SODIUM 300 MCG: 0.15 TABLET ORAL at 06:39

## 2023-03-08 RX ADMIN — INSULIN LISPRO 3 UNITS: 100 INJECTION, SOLUTION INTRAVENOUS; SUBCUTANEOUS at 17:44

## 2023-03-08 RX ADMIN — CARVEDILOL 3.12 MG: 3.12 TABLET, FILM COATED ORAL at 17:44

## 2023-03-08 RX ADMIN — Medication 10 ML: at 21:26

## 2023-03-08 RX ADMIN — AMLODIPINE BESYLATE 10 MG: 10 TABLET ORAL at 09:48

## 2023-03-08 RX ADMIN — LORAZEPAM 0.5 MG: 0.5 TABLET ORAL at 09:48

## 2023-03-08 RX ADMIN — HYDRALAZINE HYDROCHLORIDE 50 MG: 50 TABLET, FILM COATED ORAL at 15:17

## 2023-03-08 RX ADMIN — LEVETIRACETAM 250 MG: 250 TABLET, FILM COATED ORAL at 21:26

## 2023-03-08 RX ADMIN — HYDRALAZINE HYDROCHLORIDE 50 MG: 50 TABLET, FILM COATED ORAL at 09:48

## 2023-03-08 RX ADMIN — DESVENLAFAXINE SUCCINATE 25 MG: 25 TABLET, EXTENDED RELEASE ORAL at 09:49

## 2023-03-08 RX ADMIN — HYDROCODONE BITARTRATE AND ACETAMINOPHEN 1 TABLET: 5; 325 TABLET ORAL at 15:17

## 2023-03-08 RX ADMIN — INSULIN LISPRO 2 UNITS: 100 INJECTION, SOLUTION INTRAVENOUS; SUBCUTANEOUS at 12:28

## 2023-03-08 RX ADMIN — OLANZAPINE 5 MG: 5 TABLET ORAL at 09:48

## 2023-03-08 RX ADMIN — LISINOPRIL 20 MG: 20 TABLET ORAL at 21:26

## 2023-03-08 RX ADMIN — ASPIRIN 81 MG: 81 TABLET, COATED ORAL at 09:48

## 2023-03-08 RX ADMIN — OLANZAPINE 5 MG: 5 TABLET ORAL at 21:26

## 2023-03-08 NOTE — PROGRESS NOTES
Name: Zaina Martinez ADMIT: 3/4/2023   : 1965  PCP: Norman Serrato MD    MRN: [id number] LOS: 0 days   AGE/SEX: 57 y.o. female  ROOM: [facility]     Subjective   Subjective   No CP SOA NVD. She said she was feeling poorly. Not having any pain. Reported she was just tired of being here in hospital. Discussed her phosphorus level and replacement. Also discussed glucose monitoring.       Objective   Objective   Vital Signs  Temp:  [97.6 °F (36.4 °C)-99.2 °F (37.3 °C)] 97.6 °F (36.4 °C)  Heart Rate:  [81-88] 83  Resp:  [16-18] 18  BP: (139-169)/(65-77) 153/72  SpO2:  [95 %-99 %] 97 %  on   ;   Device (Oxygen Therapy): room air  Body mass index is 22.34 kg/m².  Physical Exam  Vitals and nursing note reviewed.   Constitutional:       General: She is not in acute distress.     Appearance: She is ill-appearing (chronically). She is not diaphoretic.   HENT:      Head: Normocephalic and atraumatic.   Eyes:      General:         Right eye: No discharge.         Left eye: No discharge.   Cardiovascular:      Rate and Rhythm: Normal rate and regular rhythm.      Pulses: Normal pulses.   Pulmonary:      Effort: Pulmonary effort is normal.      Breath sounds: No wheezing.   Abdominal:      General: There is no distension.      Palpations: Abdomen is soft.      Tenderness: There is no abdominal tenderness.   Musculoskeletal:         General: No swelling or deformity.   Skin:     General: Skin is warm and dry.   Neurological:      Mental Status: She is alert. Mental status is at baseline.   Psychiatric:         Mood and Affect: Mood normal.         Behavior: Behavior normal.         Results Review     I reviewed the patient's new clinical results.    Results from last 7 days   Lab Units 23  0623  0524 23  0451 23  0527   WBC 10*3/mm3 12.15* 10.49 13.93* 14.44*   HEMOGLOBIN g/dL 7.0* 6.4* 7.0* 7.4*   PLATELETS 10*3/mm3 252 215 238 276     Results from last 7 days   Lab Units 23  0612  03/06/23 0451 03/05/23 0527 03/04/23  1047   SODIUM mmol/L 132* 132* 133* 129*   POTASSIUM mmol/L 4.5 4.7 4.0 4.2   CHLORIDE mmol/L 99 94* 94* 91*   CO2 mmol/L 25.5 25.3 31.0* 30.2*   BUN mg/dL 29* 29* 23* 19   CREATININE mg/dL 2.87* 3.50* 3.07* 2.38*   GLUCOSE mg/dL 132* 143* 22* 143*   Estimated Creatinine Clearance: 18.9 mL/min (A) (by C-G formula based on SCr of 2.87 mg/dL (H)).  Results from last 7 days   Lab Units 03/08/23 0612 03/06/23 0451 03/05/23 0527 03/04/23  1047   ALBUMIN g/dL 2.2* 2.2* 2.3* 2.7*   BILIRUBIN mg/dL  --   --   --  0.5   ALK PHOS U/L  --   --   --  161*   AST (SGOT) U/L  --   --   --  25   ALT (SGPT) U/L  --   --   --  5     Results from last 7 days   Lab Units 03/08/23 0612 03/06/23 0451 03/05/23 0527 03/04/23  1047   CALCIUM mg/dL 8.1* 7.9* 7.9* 8.2*   ALBUMIN g/dL 2.2* 2.2* 2.3* 2.7*   MAGNESIUM mg/dL  --  1.6 1.8  --    PHOSPHORUS mg/dL 0.7* 6.2* 2.0*  --      Results from last 7 days   Lab Units 03/04/23  1047   PROCALCITONIN ng/mL 1.76*     COVID19   Date Value Ref Range Status   02/05/2023 Not Detected Not Detected - Ref. Range Final   02/01/2023 Not Detected Not Detected - Ref. Range Final     Glucose   Date/Time Value Ref Range Status   03/08/2023 1153 184 (H) 70 - 130 mg/dL Final     Comment:     Meter: [id number] : [id number] Day Venessa SAAVEDRA   03/08/2023 0638 127 70 - 130 mg/dL Final     Comment:     Meter: [id number] : [id number] Silvano Mckeon NA   03/08/2023 0131 158 (H) 70 - 130 mg/dL Final     Comment:     Meter: [id number] : [id number] Lorenzo DA SILVA RN   03/07/2023 2146 158 (H) 70 - 130 mg/dL Final     Comment:     Meter: [id number] : [id number] Lorenzo DA SILVA RN   03/07/2023 1641 271 (H) 70 - 130 mg/dL Final     Comment:     Meter: [id number] : [id number] Day Venessa    03/07/2023 1217 235 (H) 70 - 130 mg/dL Final     Comment:     Meter: [id number] : [id number] Day Venessa    03/07/2023 0632 162 (H) 70 - 130 mg/dL Final      Comment:     Meter: [id number] : [id number] Silvano SAAVEDRA       XR Chest 1 View  ONE VIEW PORTABLE CHEST AT 11:25 AM     HISTORY: Hypoglycemia. Leukocytosis. Chronic renal failure.     FINDINGS: The lungs are well-expanded with persistent increased density  in the lower left chest that obscures the diaphragm and consistent with  moderately large left pleural effusion and adjacent dense atelectasis as  also noted on the chest CT scan dated 02/08/2023. This is also similar  to the chest x-ray dated 02/05/2023. The right lung remains clear. The  heart remains enlarged. A large gauge right-sided vascular catheter ends  in the right atrium without change.     This report was finalized on 3/4/2023 11:51 AM by Dr. Ronnie Ritchie M.D.       I reviewed the patient's daily medications.  Scheduled Medications  amLODIPine, 10 mg, Oral, Q24H  aspirin, 81 mg, Oral, Daily  carvedilol, 3.125 mg, Oral, BID With Meals  Desvenlafaxine Succinate ER, 25 mg, Oral, Daily  epoetin brooke/brooke-epbx, 10,000 Units, Intravenous, Once per day on Mon Wed Fri  folic acid, 1 mg, Oral, Daily  hydrALAZINE, 50 mg, Oral, TID  insulin lispro, 0-7 Units, Subcutaneous, TID AC  levETIRAcetam, 250 mg, Oral, BID  levothyroxine, 300 mcg, Oral, Daily  lisinopril, 20 mg, Oral, Nightly  LORazepam, 0.5 mg, Oral, Q6H  OLANZapine, 5 mg, Oral, BID  pantoprazole, 40 mg, Oral, Daily  rOPINIRole, 2 mg, Oral, Nightly  sertraline, 50 mg, Oral, Daily  sodium chloride, 10 mL, Intravenous, Q12H  sodium phosphate IVPB, 15 mmol, Intravenous, Once    Infusions   Diet  Diet: Cardiac Diets, Diabetic Diets, Renal Diets; Healthy Heart (2-3 Na+); Consistent Carbohydrate; Low Sodium (2-3g), Low Potassium, Low Phosphorus; Texture: Regular Texture (IDDSI 7); Fluid Consistency: Thin (IDDSI 0)         I have personally reviewed:  [x]  Laboratory   [x]  Microbiology   [x]  Radiology   []  EKG/Telemetry   []  Cardiology/Vascular   []  Pathology   [x]  Records     Assessment/Plan      Active Hospital Problems    Diagnosis  POA   • **Hypoglycemia due to type 2 diabetes mellitus ([facility]) [E11.649]  Yes   • Ureteral stent present [Z96.0]  Not Applicable   • Pleural effusion [J90]  Yes   • History of stroke- R MCA s/p TPA with subsequent ICH with debility [Z86.73]  Not Applicable   • Hyponatremia [E87.1]  Yes   • ESRD (end stage renal disease) ([facility]) [N18.6]  Yes   • Abnormal urinalysis [R82.90]  Yes   • Hypothyroidism [E03.9]  Yes   • Rheumatoid arthritis ([facility]) [M06.9]  Yes   • Type 2 diabetes mellitus with kidney complication, with long-term current use of insulin ([facility]) [E11.29, Z79.4]  Not Applicable   • Leukocytosis [D72.829]  Yes   • Anemia [D64.9]  Yes      Resolved Hospital Problems   No resolved problems to display.       57 y.o. female admitted with Hypoglycemia due to type 2 diabetes mellitus ([facility]).    Hypoglycemia  Diabetes type 2  -asymptomatic severe hypoglycemia  -Improved off long acting insulin. Continue low dose SSI.      Anemia chronic disease  -Transfuse as needed.    Paroxysmal atrial fibrillation-continue home Coreg and apixaban    ESRD- Nephrology following.    Hypothyroidism- TSH was high with a low T4. Will repeat to confirm and consider increase in synthroid dosing although she is on a high dose for her weight. She has been on 300mcg since January and required IV replacement in the past. Endocrinology outpatient referral or follow up recommended.    Seizure disorder-continue home Keppra, renally dressed. She is on scheduled ativan currently. Last discharge, this was written prn. Will adjust. Jay reviewed.    Hypertension-continue home Coreg, amlodipine, hydralazine, lisinopril    RLS - continue home requip    Depression - continue home zoloft and desvenlafaxine    · SCDs for DVT prophylaxis.  · Full code.  · Anticipate discharge SNF/1-2 days depending on phosphorus trend and anemia      Alejo Weiss MD  [facility]  03/08/23  14:59 EST

## 2023-03-08 NOTE — NURSING NOTE
1225 - This RN spoke with lyssa Koch for HD scheduled for 3/8 to be rescheduled for the AM 3/9, if necessary.    1520 - Spoke with AMY Mcclure and made her aware of the scheduling change. Made RN aware of need to give unit of PRBC independently of HD.    Order amended.      Prakash Crawford RN  [facility]  Inpatient Services

## 2023-03-08 NOTE — PROGRESS NOTES
[facility] Progress Note      Patient Name: Zaina Martinez  : 1965  MRN: [id number]  Primary Care Physician:  Norman Serrato MD  Date of admission: 3/4/2023    Subjective     Interval History:   Follow up ESRD. One unit PRBC yesterday .  Eating better. Having diarrhea. Refusing lactulose .    Review of Systems:   As noted above    Objective     Vitals:   Temp:  [97.9 °F (36.6 °C)-99.2 °F (37.3 °C)] 99.2 °F (37.3 °C)  Heart Rate:  [74-88] 88  Resp:  [16-18] 18  BP: (126-169)/(58-77) 145/65    Intake/Output Summary (Last 24 hours) at 3/8/2023 0904  Last data filed at 3/7/2023 2040  Gross per 24 hour   Intake 642.83 ml   Output --   Net 642.83 ml       Physical Exam:      General Appearance: Very Chronically ill .    Skin: warm and dry. pale  HEENT:oral mucosa moist.   Neck:RIJ TDC. Tunnel non-tender.   Lungs: decreased bs left lower lobe.   Heart: RRR, no s3 or rub  Abdomen: soft, nontender, nondistended, very active bs  Extremities: no edema.  Neuro: flat affect. . Moves all 4 ext.  Scheduled Meds:     amLODIPine, 10 mg, Oral, Q24H  aspirin, 81 mg, Oral, Daily  carvedilol, 3.125 mg, Oral, BID With Meals  Desvenlafaxine Succinate ER, 25 mg, Oral, Daily  epoetin brooke/brooke-epbx, 10,000 Units, Intravenous, Once per day on   folic acid, 1 mg, Oral, Daily  hydrALAZINE, 50 mg, Oral, TID  insulin lispro, 0-7 Units, Subcutaneous, TID AC  lactulose, 15 mL, Oral, Daily  levETIRAcetam, 250 mg, Oral, BID  levothyroxine, 300 mcg, Oral, Daily  lisinopril, 20 mg, Oral, Nightly  LORazepam, 0.5 mg, Oral, Q6H  OLANZapine, 5 mg, Oral, BID  pantoprazole, 40 mg, Oral, Daily  rOPINIRole, 2 mg, Oral, Nightly  sertraline, 50 mg, Oral, Daily  sodium chloride, 10 mL, Intravenous, Q12H      IV Meds:        Results Reviewed:   I have personally reviewed the results from the time of this admission to 3/8/2023 09:04 EST     Results from last 7 days   Lab Units 23  0612 23  045  03/05/23  0527 03/04/23  1047   SODIUM mmol/L 132* 132* 133* 129*   POTASSIUM mmol/L 4.5 4.7 4.0 4.2   CHLORIDE mmol/L 99 94* 94* 91*   CO2 mmol/L 25.5 25.3 31.0* 30.2*   BUN mg/dL 29* 29* 23* 19   CREATININE mg/dL 2.87* 3.50* 3.07* 2.38*   CALCIUM mg/dL 8.1* 7.9* 7.9* 8.2*   BILIRUBIN mg/dL  --   --   --  0.5   ALK PHOS U/L  --   --   --  161*   ALT (SGPT) U/L  --   --   --  5   AST (SGOT) U/L  --   --   --  25   GLUCOSE mg/dL 132* 143* 22* 143*       Estimated Creatinine Clearance: 18.9 mL/min (A) (by C-G formula based on SCr of 2.87 mg/dL (H)).    Results from last 7 days   Lab Units 03/08/23  0612 03/06/23 0451 03/05/23  0527   MAGNESIUM mg/dL  --  1.6 1.8   PHOSPHORUS mg/dL 0.7* 6.2* 2.0*             Results from last 7 days   Lab Units 03/08/23  0612 03/07/23  0524 03/06/23  0451 03/05/23  0527 03/04/23  0931   WBC 10*3/mm3 12.15* 10.49 13.93* 14.44* 21.55*   HEMOGLOBIN g/dL 7.0* 6.4* 7.0* 7.4* 9.9*   PLATELETS 10*3/mm3 252 215 238 276 293             Assessment / Plan     ASSESSMENT:  1. ESRD.  HD today .  2. Failure to thrive,  Multiple hospitalizations.  3. Anemia CKD>  One unit PRBC 3/7. . Give another on dialysis today . Epo with HD. May need to re-scan abdomen with history of hematoma in January .  4. DM2 very labile  5. HTN controlled .  6. Hypothyroid with previously very elevated TSH.  Much improved .  7. Chronic systolic heart failure .  8. Left renal hematoma after left ureteral stent 12/30. Left renal artery embolization 1/1/23. Left stent removal 1/10/23.   PLAN:  1. HD today .  2. PRBC with HD today .  3. EPO with HD today .  4. Change lactulose to prn .  5. Replace Phos IV.  Ruth Lira MD  03/08/23  09:04 [facility]    [facility]  [phone]

## 2023-03-08 NOTE — PLAN OF CARE
Goal Outcome Evaluation:  Temp max 99,2, turnedq 2 hrs, mepilex to coccyx changed, no open area, large bruised area on left upper arm, medicated for pain x 1with relief, inc of bowel & bladder, BM today, contact isolation maintained, falls protocol maintained, bed alarm in use, hemodialysis postponed until tomorrow, 1 unit PRBCs currently infusing, sliding scale coverage for elevated blood glucose  Plan of Care Reviewed With: patient

## 2023-03-08 NOTE — PLAN OF CARE
Problem: Adult Inpatient Plan of Care  Goal: Plan of Care Review  Outcome: Ongoing, Progressing  Flowsheets (Taken 3/8/2023 9047)  Progress: improving  Plan of Care Reviewed With: patient  Outcome Evaluation: more alert and oriented tonight, family at bedside, took a cup of vegetable broth and drank 1/3 Glucerna, still having loose stools, incontinence care done, bo area and hailey groin still red with moisture associated rash, coccyx red  but blanchable mepilex in place, barrier cream applied, repositioned, VSS, Hemodialysis scheduled today with a unit of PRBC to be transfused, blood sugar monitored closely for hypoglycemia   Goal Outcome Evaluation:  Plan of Care Reviewed With: patient        Progress: improving  Outcome Evaluation: more alert and oriented tonight, family at bedside, took a cup of vegetable broth and drank 1/3 Glucerna, still having loose stools, incontinence care done, bo area and hailey groin still red with moisture associated rash, coccyx red  but blanchable mepilex in place, barrier cream applied, repositioned, VSS, Hemodialysis scheduled today with a unit of PRBC to be transfused, blood sugar monitored closely for hypoglycemia

## 2023-03-09 LAB
ALBUMIN SERPL-MCNC: 2.3 G/DL (ref 3.5–5.2)
ANION GAP SERPL CALCULATED.3IONS-SCNC: 14.4 MMOL/L (ref 5–15)
BASOPHILS # BLD AUTO: 0.04 10*3/MM3 (ref 0–0.2)
BASOPHILS NFR BLD AUTO: 0.3 % (ref 0–1.5)
BH BB BLOOD EXPIRATION DATE: NORMAL
BH BB BLOOD TYPE BARCODE: 5100
BH BB DISPENSE STATUS: NORMAL
BH BB PRODUCT CODE: NORMAL
BH BB UNIT NUMBER: NORMAL
BUN SERPL-MCNC: 45 MG/DL (ref 6–20)
BUN/CREAT SERPL: 13.4 (ref 7–25)
CALCIUM SPEC-SCNC: 8.3 MG/DL (ref 8.6–10.5)
CHLORIDE SERPL-SCNC: 99 MMOL/L (ref 98–107)
CO2 SERPL-SCNC: 19.6 MMOL/L (ref 22–29)
CREAT SERPL-MCNC: 3.36 MG/DL (ref 0.57–1)
CROSSMATCH INTERPRETATION: NORMAL
DEPRECATED RDW RBC AUTO: 52.2 FL (ref 37–54)
EGFRCR SERPLBLD CKD-EPI 2021: 15.4 ML/MIN/1.73
EOSINOPHIL # BLD AUTO: 0.03 10*3/MM3 (ref 0–0.4)
EOSINOPHIL NFR BLD AUTO: 0.2 % (ref 0.3–6.2)
ERYTHROCYTE [DISTWIDTH] IN BLOOD BY AUTOMATED COUNT: 16.9 % (ref 12.3–15.4)
GLUCOSE BLDC GLUCOMTR-MCNC: 146 MG/DL (ref 70–130)
GLUCOSE BLDC GLUCOMTR-MCNC: 179 MG/DL (ref 70–130)
GLUCOSE BLDC GLUCOMTR-MCNC: 376 MG/DL (ref 70–130)
GLUCOSE SERPL-MCNC: 381 MG/DL (ref 65–99)
HCT VFR BLD AUTO: 27.5 % (ref 34–46.6)
HGB BLD-MCNC: 8.4 G/DL (ref 12–15.9)
IMM GRANULOCYTES # BLD AUTO: 0.11 10*3/MM3 (ref 0–0.05)
IMM GRANULOCYTES NFR BLD AUTO: 0.8 % (ref 0–0.5)
LYMPHOCYTES # BLD AUTO: 1.4 10*3/MM3 (ref 0.7–3.1)
LYMPHOCYTES NFR BLD AUTO: 9.9 % (ref 19.6–45.3)
MCH RBC QN AUTO: 25.9 PG (ref 26.6–33)
MCHC RBC AUTO-ENTMCNC: 30.5 G/DL (ref 31.5–35.7)
MCV RBC AUTO: 84.9 FL (ref 79–97)
MONOCYTES # BLD AUTO: 1.06 10*3/MM3 (ref 0.1–0.9)
MONOCYTES NFR BLD AUTO: 7.5 % (ref 5–12)
NEUTROPHILS NFR BLD AUTO: 11.49 10*3/MM3 (ref 1.7–7)
NEUTROPHILS NFR BLD AUTO: 81.3 % (ref 42.7–76)
NRBC BLD AUTO-RTO: 0 /100 WBC (ref 0–0.2)
PHOSPHATE SERPL-MCNC: 1 MG/DL (ref 2.5–4.5)
PLATELET # BLD AUTO: 247 10*3/MM3 (ref 140–450)
PMV BLD AUTO: 9.6 FL (ref 6–12)
POTASSIUM SERPL-SCNC: 5.5 MMOL/L (ref 3.5–5.2)
RBC # BLD AUTO: 3.24 10*6/MM3 (ref 3.77–5.28)
SODIUM SERPL-SCNC: 133 MMOL/L (ref 136–145)
T4 FREE SERPL-MCNC: 1.27 NG/DL (ref 0.93–1.7)
TSH SERPL DL<=0.05 MIU/L-ACNC: 10.7 UIU/ML (ref 0.27–4.2)
UNIT  ABO: NORMAL
UNIT  RH: NORMAL
WBC NRBC COR # BLD: 14.13 10*3/MM3 (ref 3.4–10.8)

## 2023-03-09 PROCEDURE — G0378 HOSPITAL OBSERVATION PER HR: HCPCS

## 2023-03-09 PROCEDURE — G0257 UNSCHED DIALYSIS ESRD PT HOS: HCPCS

## 2023-03-09 PROCEDURE — 63710000001 INSULIN LISPRO (HUMAN) PER 5 UNITS: Performed by: STUDENT IN AN ORGANIZED HEALTH CARE EDUCATION/TRAINING PROGRAM

## 2023-03-09 PROCEDURE — 25010000002 HEPARIN (PORCINE) PER 1000 UNITS: Performed by: INTERNAL MEDICINE

## 2023-03-09 PROCEDURE — 84443 ASSAY THYROID STIM HORMONE: CPT | Performed by: INTERNAL MEDICINE

## 2023-03-09 PROCEDURE — 96365 THER/PROPH/DIAG IV INF INIT: CPT

## 2023-03-09 PROCEDURE — 80069 RENAL FUNCTION PANEL: CPT | Performed by: INTERNAL MEDICINE

## 2023-03-09 PROCEDURE — 96366 THER/PROPH/DIAG IV INF ADDON: CPT

## 2023-03-09 PROCEDURE — 96372 THER/PROPH/DIAG INJ SC/IM: CPT

## 2023-03-09 PROCEDURE — 85025 COMPLETE CBC W/AUTO DIFF WBC: CPT | Performed by: STUDENT IN AN ORGANIZED HEALTH CARE EDUCATION/TRAINING PROGRAM

## 2023-03-09 PROCEDURE — 82962 GLUCOSE BLOOD TEST: CPT

## 2023-03-09 PROCEDURE — 97110 THERAPEUTIC EXERCISES: CPT

## 2023-03-09 PROCEDURE — 25010000002 EPOETIN ALFA-EPBX 10000 UNIT/ML SOLUTION: Performed by: INTERNAL MEDICINE

## 2023-03-09 PROCEDURE — 84439 ASSAY OF FREE THYROXINE: CPT | Performed by: INTERNAL MEDICINE

## 2023-03-09 RX ORDER — HEPARIN SODIUM 1000 [USP'U]/ML
3800 INJECTION, SOLUTION INTRAVENOUS; SUBCUTANEOUS AS NEEDED
Status: DISCONTINUED | OUTPATIENT
Start: 2023-03-09 | End: 2023-03-11 | Stop reason: HOSPADM

## 2023-03-09 RX ADMIN — OLANZAPINE 5 MG: 5 TABLET ORAL at 15:28

## 2023-03-09 RX ADMIN — CARVEDILOL 3.12 MG: 3.12 TABLET, FILM COATED ORAL at 21:31

## 2023-03-09 RX ADMIN — HYDRALAZINE HYDROCHLORIDE 50 MG: 50 TABLET, FILM COATED ORAL at 15:28

## 2023-03-09 RX ADMIN — FOLIC ACID 1 MG: 1 TABLET ORAL at 15:28

## 2023-03-09 RX ADMIN — ROPINIROLE 2 MG: 2 TABLET, FILM COATED ORAL at 21:31

## 2023-03-09 RX ADMIN — Medication 10 ML: at 21:33

## 2023-03-09 RX ADMIN — SODIUM PHOSPHATE, MONOBASIC, MONOHYDRATE AND SODIUM PHOSPHATE, DIBASIC, ANHYDROUS 15 MMOL: 276; 142 INJECTION, SOLUTION INTRAVENOUS at 12:47

## 2023-03-09 RX ADMIN — OLANZAPINE 5 MG: 5 TABLET ORAL at 21:31

## 2023-03-09 RX ADMIN — SERTRALINE 50 MG: 50 TABLET, FILM COATED ORAL at 15:28

## 2023-03-09 RX ADMIN — LEVETIRACETAM 250 MG: 250 TABLET, FILM COATED ORAL at 21:32

## 2023-03-09 RX ADMIN — EPOETIN ALFA-EPBX 10000 UNITS: 10000 INJECTION, SOLUTION INTRAVENOUS; SUBCUTANEOUS at 12:55

## 2023-03-09 RX ADMIN — AMLODIPINE BESYLATE 10 MG: 10 TABLET ORAL at 15:28

## 2023-03-09 RX ADMIN — LORAZEPAM 0.5 MG: 0.5 TABLET ORAL at 15:28

## 2023-03-09 RX ADMIN — CARVEDILOL 3.12 MG: 3.12 TABLET, FILM COATED ORAL at 15:28

## 2023-03-09 RX ADMIN — PANTOPRAZOLE SODIUM 40 MG: 40 TABLET, DELAYED RELEASE ORAL at 05:33

## 2023-03-09 RX ADMIN — LORAZEPAM 0.5 MG: 0.5 TABLET ORAL at 05:33

## 2023-03-09 RX ADMIN — INSULIN LISPRO 6 UNITS: 100 INJECTION, SOLUTION INTRAVENOUS; SUBCUTANEOUS at 08:27

## 2023-03-09 RX ADMIN — HYDROCODONE BITARTRATE AND ACETAMINOPHEN 1 TABLET: 5; 325 TABLET ORAL at 01:32

## 2023-03-09 RX ADMIN — LISINOPRIL 20 MG: 20 TABLET ORAL at 21:32

## 2023-03-09 RX ADMIN — DESVENLAFAXINE SUCCINATE 25 MG: 25 TABLET, EXTENDED RELEASE ORAL at 15:28

## 2023-03-09 RX ADMIN — Medication 10 ML: at 08:28

## 2023-03-09 RX ADMIN — HYDROCODONE BITARTRATE AND ACETAMINOPHEN 1 TABLET: 5; 325 TABLET ORAL at 21:43

## 2023-03-09 RX ADMIN — LEVOTHYROXINE SODIUM 300 MCG: 0.15 TABLET ORAL at 05:33

## 2023-03-09 RX ADMIN — HEPARIN SODIUM 3800 UNITS: 1000 INJECTION INTRAVENOUS; SUBCUTANEOUS at 12:56

## 2023-03-09 RX ADMIN — HYDRALAZINE HYDROCHLORIDE 50 MG: 50 TABLET, FILM COATED ORAL at 21:32

## 2023-03-09 RX ADMIN — INSULIN LISPRO 2 UNITS: 100 INJECTION, SOLUTION INTRAVENOUS; SUBCUTANEOUS at 12:47

## 2023-03-09 RX ADMIN — HYDROCODONE BITARTRATE AND ACETAMINOPHEN 1 TABLET: 5; 325 TABLET ORAL at 15:28

## 2023-03-09 RX ADMIN — ASPIRIN 81 MG: 81 TABLET, COATED ORAL at 15:28

## 2023-03-09 RX ADMIN — LEVETIRACETAM 250 MG: 250 TABLET, FILM COATED ORAL at 15:28

## 2023-03-09 NOTE — THERAPY TREATMENT NOTE
Patient Name: Zaina Martinez  : 1965    MRN: [id number]                              Today's Date: 3/9/2023       Admit Date: 3/4/2023    Visit Dx:     ICD-10-CM ICD-9-CM   1. Hypoglycemia due to type 2 diabetes mellitus ([facility])  E11.649 250.80   2. Leukocytosis, unspecified type  D72.829 288.60   3. Rheumatoid arthritis, involving unspecified site, unspecified whether rheumatoid factor present ([facility])  M06.9 714.0   4. ESRD (end stage renal disease) ([facility])  N18.6 585.6     Patient Active Problem List   Diagnosis   • Renal insufficiency   • Hypertensive disorder   • Hypothyroidism   • Type 2 diabetes mellitus with kidney complication, with long-term current use of insulin ([facility])   • Rheumatoid arthritis ([facility])   • Angioedema   • Esophageal dysmotility   • Anemia   • Medically noncompliant   • Myocardial infarction due to demand ischemia ([facility])   • Enteritis   • PRES (posterior reversible encephalopathy syndrome)   • Urine retention   • Klebsiella infection   • Superficial thrombophlebitis   • Generalized weakness   • ESRD (end stage renal disease) ([facility])   • CAD (coronary artery disease)   • Abnormal urinalysis   • Chronic diastolic CHF (congestive heart failure) ([facility])   • Pyelonephritis   • Calculus of gallbladder with acute on chronic cholecystitis without obstruction   • Pleural effusion on right   • Anemia due to chronic kidney disease, on chronic dialysis ([facility])   • Abnormal findings on diagnostic imaging of other specified body structures   • Acute upper respiratory infection   • Agitation   • Alkaline phosphatase raised   • Casts present in urine   • Cellulitis of toe   • Hip pain   • Community acquired pneumonia   • Depressive disorder   • Diarrhea of presumed infectious origin   • Difficult or painful urination   • Disease due to severe acute respiratory syndrome coronavirus 2 (SARS-CoV-2)   • Dyspnea   • Encounter for follow-up examination after completed treatment for conditions other than malignant neoplasm    • H/O: hypothyroidism   • Hyperlipidemia   • Hypomagnesemia   • Intractable vomiting with nausea   • Leukocytosis   • Luetscher's syndrome   • Need for influenza vaccination   • Restless legs   • Noncompliance with treatment   • Shoulder pain   • Acute UTI (urinary tract infection)   • Metabolic encephalopathy   • Abnormal findings on diagnostic imaging of abdomen   • Status post cholecystectomy   • Hyponatremia   • Acute metabolic encephalopathy   • Encephalopathy, toxic   • Acute CVA (cerebrovascular accident) (HCC)   • History of intracranial hemorrhage   • Stroke (HCC)   • Abnormal urinalysis   • Diabetic muscle infarction (HCC)   • Other insomnia   • Altered mental status   • History of stroke- R MCA s/p TPA with subsequent ICH with debility   • Heart murmur   • Bradycardia   • Left lower lobe pneumonia   • Metabolic encephalopathy   • Pericardial effusion   • Pleural effusion   • Acute pulmonary edema (HCC)   • Atrial flutter (HCC)   • Chronic systolic CHF (congestive heart failure) ([facility])   • Thrombus of venous dialysis catheter ([facility])   • Sepsis without acute organ dysfunction (HCC)   • Type 2 diabetes mellitus with hyperglycemia, with long-term current use of insulin (HCC)   • Acute respiratory failure with hypoxia (HCC)   • Acute on chronic systolic CHF (congestive heart failure) ([facility])   • Hyperkalemia   • Acute respiratory failure with hypoxia (HCC)   • Other hypervolemia   • Hematoma of right hip, initial encounter   • Ureteral stent present   • Closed intertrochanteric fracture of right femur ([facility])   • Witnessed seizure-like activity ([facility])   • Acute respiratory failure with hypercapnia (HCC)   • Opioid use   • Diabetic muscle infarction (HCC)   • Acute posthemorrhagic anemia   • Kidney hematoma   • Pyuria   • Severe malnutrition (HCC)   • Hypoglycemia due to type 2 diabetes mellitus (HCC)   • Moderate malnutrition (HCC)     Past Medical History:   Diagnosis Date   • Acute CVA (cerebrovascular accident)  ([facility]) 05/01/2022   • Acute on chronic diastolic CHF (congestive heart failure) ([facility])    • Anemia    • CAD (coronary artery disease) 12/20/2021   • Diabetes ([facility])    • Disease of thyroid gland    • GERD (gastroesophageal reflux disease)    • History of intracranial hemorrhage 5/1/2022   • Hyperlipidemia 11/30/2018   • Hypertension    • Rheumatoid arthritis ([facility])    • Stage 5 chronic kidney disease (HCC)      Past Surgical History:   Procedure Laterality Date   • CHOLECYSTECTOMY WITH INTRAOPERATIVE CHOLANGIOGRAM N/A 1/10/2022    Procedure: Laparoscopic cholecystectomy with intraoperative cholangiogram;  Surgeon: Ramana Raygoza MD;  Location: [facility] OR;  Service: General;  Laterality: N/A;   • CYSTOSCOPY W/ URETERAL STENT PLACEMENT Left 9/29/2022    Procedure: CYSTOSCOPY URETERAL STENT INSERTION WITH RETROGRADE PYELOGRAM;  Surgeon: Bryant Phan Jr., MD;  Location: [facility] OR;  Service: Urology;  Laterality: Left;   • CYSTOSCOPY W/ URETERAL STENT PLACEMENT Left 1/10/2023    Procedure: CYSTOSCOPY LEFT STENT REMOVAL;  Surgeon: Bryant Phan Jr., MD;  Location: [facility] OR;  Service: Urology;  Laterality: Left;   • EMBOLIZATION MESENTERIC ARTERY N/A 1/1/2023    Procedure: LEFT KIDNEY EMBOLIZATION;  Surgeon: Bijan Ordoñez MD;  Location: [facility] OR 18/19;  Service: Interventional Radiology;  Laterality: N/A;   • EYE SURGERY     • FEMUR IM NAILING/RODDING Right 11/10/2022    Procedure: FEMUR INTRAMEDULLARY NAILING/RODDING;  Surgeon: Glen Pierce MD;  Location: [facility] OR;  Service: Orthopedics;  Laterality: Right;   • HIP INTERTROCHANTERIC NAILING Right 11/10/2022    Procedure: HIP INTERTROCHANTERIC NAILING;  Surgeon: Glen Pierce MD;  Location: [facility] OR;  Service: Orthopedics;  Laterality: Right;   • HYSTERECTOMY     • INSERT CENTRAL LINE AT BEDSIDE  12/31/2022        • INSERTION HEMODIALYSIS CATHETER N/A 12/6/2021    Procedure: HEMODIALYSIS CATHETER  INSERTION;  Surgeon: Keli Salazar MD;  Location: [facility] OR 18/19;  Service: Vascular;  Laterality: N/A;   • INSERTION HEMODIALYSIS CATHETER N/A 5/3/2022    Procedure: TUNNELED CATHETER PLACEMENT;  Surgeon: Keli Salazar MD;  Location: [facility] OR;  Service: Vascular;  Laterality: N/A;   • MUSCLE BIOPSY Left 6/15/2022    Procedure: Left quadriceps muscle biopsy;  Surgeon: Marques Ma MD;  Location: [facility] OR;  Service: Neurosurgery;  Laterality: Left;   • URETEROSCOPY LASER LITHOTRIPSY WITH STENT INSERTION Left 12/30/2022    Procedure: CYSTOSCOPY WITH LEFT  URETEROSCOPY  LEFT STENT EXCHANGE;  Surgeon: Bryant Phan Jr., MD;  Location: [facility] OR;  Service: Urology;  Laterality: Left;      General Information     Row Name 03/09/23 1624          Physical Therapy Time and Intention    Document Type therapy note (daily note)  -     Mode of Treatment individual therapy;physical therapy  -     Row Name 03/09/23 1624          General Information    Patient Profile Reviewed yes  -     Existing Precautions/Restrictions fall  -     Row Name 03/09/23 1627          Cognition    Orientation Status (Cognition) oriented to;person;unable/difficult to assess  not much conversation, just moaning, had to awaken for session  -     Row Name 03/09/23 1629          Safety Issues, Functional Mobility    Safety Issues Affecting Function (Mobility) judgment;problem-solving;safety precaution awareness  -     Impairments Affecting Function (Mobility) balance;endurance/activity tolerance;postural/trunk control;strength  -     Comment, Safety Issues/Impairments (Mobility) moaning throughout tfer and sitting EOB, pt denied pain in specific, just stated she was tired  -           User Key  (r) = Recorded By, (t) = Taken By, (c) = Cosigned By    Initials Name Provider Type     Farida Baires PTA Physical Therapist Assistant               Mobility     Row Name 03/09/23 8415          Bed  Mobility    Bed Mobility scooting/bridging  -     Scooting/Bridging [place] (Bed Mobility) 2 person assist;maximum assist (25% patient effort);dependent (less than 25% patient effort)  more difficult to assist, draw sheet not under pt very well  -     Supine-Sit [place] (Bed Mobility) 2 person assist;maximum assist (25% patient effort);dependent (less than 25% patient effort)  -     Comment, (Bed Mobility) Pt sat EOB, both feet not completely placed on floor, pt sitting side-ways EOB , but not want to be moved anymore  -[facility] Name 03/09/23 1627          Bed-Chair Transfer    Bed-Chair [place] (Transfers) unable to assess  -JM     Row Name 03/09/23 1627          Sit-Stand Transfer    Sit-Stand [place] (Transfers) unable to assess  -JM     Row Name 03/09/23 1627          Gait/Stairs (Locomotion)    Comment, (Gait/Stairs) pt too weak and not tolerating EOB sitting w/o assist of 2, even w/encouragement, pt not able to incr participation  -           User Key  (r) = Recorded By, (t) = Taken By, (c) = Cosigned By    Initials Name Provider Type    Farida Stoner PTA Physical Therapist Assistant               Obj/Interventions     [facility] Name 03/09/23 1630          Motor Skills    Therapeutic Exercise --  after coaxing into some activity at EOB, pt perf LAQs x5 bilat , did not fully extend knees, still too fatigued  -           User Key  (r) = Recorded By, (t) = Taken By, (c) = Cosigned By    Initials Name Provider Type    Farida Stoner PTA Physical Therapist Assistant               Goals/Plan    No documentation.                Clinical Impression     Row Name 03/09/23 1632          Pain    Pretreatment Pain Rating 0/10 - no pain  -[facility] Name 03/09/23 1632          Plan of Care Review    Plan of Care Reviewed With patient  -     Outcome Evaluation Pt fatigued upon entry , actually had to awaken to try any PT; pt had dialysis and just finished recently; pt req MAX/DEP -2  for bed mobility, pt iraida LAQs x5 at EOB, but too weak to fully ext knees, unsafe to attempt STS tfer; pt moaning throughout session and said it was because she was tired, denied pain, will need SNU as pt is unsafe , falls risk for home currently, will see how pt progresses when not so fatigued  -VIC     Row Name 03/09/23 1632          Therapy Assessment/Plan (PT)    Rehab Potential (PT) fair, will monitor progress closely  -     Criteria for Skilled Interventions Met (PT) yes  -VIC     Row Name 03/09/23 1632          Positioning and Restraints    Pre-Treatment Position in bed  -     Post Treatment Position bed  -JM     In Bed supine;with nsg  RN in room to change pt's brief  -           User Key  (r) = Recorded By, (t) = Taken By, (c) = Cosigned By    Initials Name Provider Type    Farida Stoner PTA Physical Therapist Assistant               Outcome Measures     Row Name 03/09/23 1641 03/09/23 0837       How much help from another person do you currently need...    Turning from your back to your side while in flat bed without using bedrails? 2  - 3  -KK    Moving from lying on back to sitting on the side of a flat bed without bedrails? 2  -JM 2  -KK    Moving to and from a bed to a chair (including a wheelchair)? 1  - 1  -KK    Standing up from a chair using your arms (e.g., wheelchair, bedside chair)? 1  - 2  -KK    Climbing 3-5 steps with a railing? 1  - 1  -KK    To walk in hospital room? 1  - 1  -KK    AM-PAC 6 Clicks Score (PT) 8  - 10  -KK    Highest level of mobility 3 --> Sat at edge of bed  - 4 --> Transferred to chair/commode  -          User Key  (r) = Recorded By, (t) = Taken By, (c) = Cosigned By    Initials Name Provider Type    Amanda Ellis, RN Registered Nurse    Farida Stoner PTA Physical Therapist Assistant                             Physical Therapy Education     Title: PT OT SLP Therapies (In Progress)     Topic: Physical Therapy (In Progress)     Point:  Mobility training (In Progress)     Learning Progress Summary           Patient Acceptance, E,D, NR by  at 3/9/2023 1642    Acceptance, E, VU,NR by  at 3/6/2023 1015                   Point: Home exercise program (In Progress)     Learning Progress Summary           Patient Acceptance, E,D, NR by  at 3/9/2023 1642                   Point: Body mechanics (In Progress)     Learning Progress Summary           Patient Acceptance, E,D, NR by  at 3/9/2023 1642    Acceptance, E, VU,NR by  at 3/6/2023 1015                   Point: Precautions (In Progress)     Learning Progress Summary           Patient Acceptance, E,D, NR by  at 3/9/2023 1642    Acceptance, E, VU,NR by  at 3/6/2023 1015                               User Key     Initials Effective Dates Name Provider Type [facility] 03/07/18 -  Farida Baires PTA Physical Therapist Assistant PT     05/02/22 -  Aviva Salgado PT Physical Therapist PT              PT Recommendation and Plan     Plan of Care Reviewed With: patient  Outcome Evaluation: Pt fatigued upon entry , actually had to awaken to try any PT; pt had dialysis and just finished recently; pt req MAX/DEP -2 for bed mobility, pt iraida LAQs x5 at EOB, but too weak to fully ext knees, unsafe to attempt STS tfer; pt moaning throughout session and said it was because she was tired, denied pain, will need SNU as pt is unsafe , falls risk for home currently, will see how pt progresses when not so fatigued     Time Calculation:    PT Charges     Row Name 03/09/23 1643             Time Calculation    Start Time 1519  -      Stop Time 1532  -      Time Calculation (min) 13 min  -      PT Received On 03/09/23  -      PT - Next Appointment 03/10/23  -            User Key  (r) = Recorded By, (t) = Taken By, (c) = Cosigned By    Initials Name Provider Type    Farida Stoner PTA Physical Therapist Assistant              Therapy Charges for Today     Code Description Service Date  Service Provider Modifiers Qty    03178994157 HC PT THER PROC EA 15 MIN 3/9/2023 Farida Baires PTA GP 1    24667419957 HC PT THER SUPP EA 15 MIN 3/9/2023 Farida Baires PTA GP 1          PT G-Codes  Outcome Measure Options: AM-PAC 6 Clicks Basic Mobility (PT)  AM-PAC 6 Clicks Score (PT): 8  PT Discharge Summary  Anticipated Discharge Disposition (PT): skilled nursing facility    Farida Baires PTA  3/9/2023

## 2023-03-09 NOTE — PROGRESS NOTES
Name: Zaina Martinez ADMIT: 3/4/2023   : 1965  PCP: Norman Serrato MD    MRN: [id number] LOS: 0 days   AGE/SEX: 57 y.o. female  ROOM: [facility]     Subjective   Subjective   She is feeling a bit better today compared to yesterday overall.  No chest pain shortness of breath nausea vomiting or diarrhea reported.  She was about to start dialysis.       Objective   Objective   Vital Signs  Temp:  [96.5 °F (35.8 °C)-99.2 °F (37.3 °C)] 98.3 °F (36.8 °C)  Heart Rate:  [79-88] 88  Resp:  [16-18] 16  BP: (128-158)/(58-78) 158/78  SpO2:  [93 %-100 %] 100 %  on   ;   Device (Oxygen Therapy): room air  Body mass index is 24.72 kg/m².  Physical Exam  Vitals and nursing note reviewed.   Constitutional:       General: She is not in acute distress.     Appearance: She is ill-appearing (chronically). She is not diaphoretic.   HENT:      Head: Normocephalic and atraumatic.   Eyes:      General:         Right eye: No discharge.         Left eye: No discharge.   Cardiovascular:      Rate and Rhythm: Normal rate and regular rhythm.      Pulses: Normal pulses.   Pulmonary:      Effort: Pulmonary effort is normal.      Breath sounds: No wheezing.   Abdominal:      General: There is no distension.      Palpations: Abdomen is soft.      Tenderness: There is no abdominal tenderness.   Musculoskeletal:         General: No swelling or deformity.   Skin:     General: Skin is warm and dry.   Neurological:      Mental Status: She is alert. Mental status is at baseline.   Psychiatric:         Mood and Affect: Mood normal.         Behavior: Behavior normal.         Results Review     I reviewed the patient's new clinical results.    Results from last 7 days   Lab Units 23  0557 23  0612 23  0524 23  045   WBC 10*3/mm3 14.13* 12.15* 10.49 13.93*   HEMOGLOBIN g/dL 8.4* 7.0* 6.4* 7.0*   PLATELETS 10*3/mm3 247 252 215 238     Results from last 7 days   Lab Units 23  0612 23  04523  0527  03/04/23  1047   SODIUM mmol/L 132* 132* 133* 129*   POTASSIUM mmol/L 4.5 4.7 4.0 4.2   CHLORIDE mmol/L 99 94* 94* 91*   CO2 mmol/L 25.5 25.3 31.0* 30.2*   BUN mg/dL 29* 29* 23* 19   CREATININE mg/dL 2.87* 3.50* 3.07* 2.38*   GLUCOSE mg/dL 132* 143* 22* 143*   Estimated Creatinine Clearance: 18.6 mL/min (A) (by C-G formula based on SCr of 2.87 mg/dL (H)).  Results from last 7 days   Lab Units 03/08/23  0612 03/06/23 0451 03/05/23 0527 03/04/23  1047   ALBUMIN g/dL 2.2* 2.2* 2.3* 2.7*   BILIRUBIN mg/dL  --   --   --  0.5   ALK PHOS U/L  --   --   --  161*   AST (SGOT) U/L  --   --   --  25   ALT (SGPT) U/L  --   --   --  5     Results from last 7 days   Lab Units 03/08/23  0612 03/06/23 0451 03/05/23 0527 03/04/23  1047   CALCIUM mg/dL 8.1* 7.9* 7.9* 8.2*   ALBUMIN g/dL 2.2* 2.2* 2.3* 2.7*   MAGNESIUM mg/dL  --  1.6 1.8  --    PHOSPHORUS mg/dL 0.7* 6.2* 2.0*  --      Results from last 7 days   Lab Units 03/04/23  1047   PROCALCITONIN ng/mL 1.76*     COVID19   Date Value Ref Range Status   02/05/2023 Not Detected Not Detected - Ref. Range Final   02/01/2023 Not Detected Not Detected - Ref. Range Final     Glucose   Date/Time Value Ref Range Status   03/09/2023 0556 376 (H) 70 - 130 mg/dL Final     Comment:     Meter: [id number] : [id number] Ira Varma QUENTIN   03/08/2023 1708 227 (H) 70 - 130 mg/dL Final     Comment:     Meter: [id number] : [id number] Jessica Bello NA   03/08/2023 1153 184 (H) 70 - 130 mg/dL Final     Comment:     Meter: [id number] : [id number] Jacki Clifford NA   03/08/2023 0638 127 70 - 130 mg/dL Final     Comment:     Meter: [id number] : [id number] Silvano Mckeon NA   03/08/2023 0131 158 (H) 70 - 130 mg/dL Final     Comment:     Meter: [id number] : [id number] Lorenzo DA SILVA RN   03/07/2023 2146 158 (H) 70 - 130 mg/dL Final     Comment:     Meter: [id number] : [id number] Lorenzo DA SILVA RN   03/07/2023 1641 271 (H) 70 - 130 mg/dL Final     Comment:     Meter: [id number]  : [id number] Jacki SAAVEDRA       XR Chest 1 View  ONE VIEW PORTABLE CHEST AT 11:25 AM     HISTORY: Hypoglycemia. Leukocytosis. Chronic renal failure.     FINDINGS: The lungs are well-expanded with persistent increased density  in the lower left chest that obscures the diaphragm and consistent with  moderately large left pleural effusion and adjacent dense atelectasis as  also noted on the chest CT scan dated 02/08/2023. This is also similar  to the chest x-ray dated 02/05/2023. The right lung remains clear. The  heart remains enlarged. A large gauge right-sided vascular catheter ends  in the right atrium without change.     This report was finalized on 3/4/2023 11:51 AM by Dr. Ronnie Ritchie M.D.       I reviewed the patient's daily medications.  Scheduled Medications  amLODIPine, 10 mg, Oral, Q24H  aspirin, 81 mg, Oral, Daily  carvedilol, 3.125 mg, Oral, BID With Meals  Desvenlafaxine Succinate ER, 25 mg, Oral, Daily  epoetin brooke/brooke-epbx, 10,000 Units, Intravenous, Once per day on Mon Wed Fri  epoetin brooke/brooke-epbx, 10,000 Units, Subcutaneous, Once  folic acid, 1 mg, Oral, Daily  hydrALAZINE, 50 mg, Oral, TID  insulin lispro, 0-7 Units, Subcutaneous, TID AC  levETIRAcetam, 250 mg, Oral, BID  levothyroxine, 300 mcg, Oral, Daily  lisinopril, 20 mg, Oral, Nightly  OLANZapine, 5 mg, Oral, BID  pantoprazole, 40 mg, Oral, Daily  rOPINIRole, 2 mg, Oral, Nightly  sertraline, 50 mg, Oral, Daily  sodium chloride, 10 mL, Intravenous, Q12H    Infusions   Diet  Diet: Cardiac Diets, Diabetic Diets, Renal Diets; Healthy Heart (2-3 Na+); Consistent Carbohydrate; Low Sodium (2-3g), Low Potassium, Low Phosphorus; Texture: Regular Texture (IDDSI 7); Fluid Consistency: Thin (IDDSI 0)         I have personally reviewed:  [x]  Laboratory   []  Microbiology   []  Radiology   []  EKG/Telemetry   []  Cardiology/Vascular   []  Pathology   []  Records     Assessment/Plan     Active Hospital Problems    Diagnosis  POA   •  **Hypoglycemia due to type 2 diabetes mellitus ([facility]) [E11.649]  Yes   • Moderate malnutrition ([facility]) [E44.0]  Yes   • Ureteral stent present [Z96.0]  Not Applicable   • Pleural effusion [J90]  Yes   • History of stroke- R MCA s/p TPA with subsequent ICH with debility [Z86.73]  Not Applicable   • Hyponatremia [E87.1]  Yes   • ESRD (end stage renal disease) ([facility]) [N18.6]  Yes   • Abnormal urinalysis [R82.90]  Yes   • Hypothyroidism [E03.9]  Yes   • Rheumatoid arthritis ([facility]) [M06.9]  Yes   • Type 2 diabetes mellitus with kidney complication, with long-term current use of insulin ([facility]) [E11.29, Z79.4]  Not Applicable   • Leukocytosis [D72.829]  Yes   • Anemia [D64.9]  Yes      Resolved Hospital Problems   No resolved problems to display.       57 y.o. female admitted with Hypoglycemia due to type 2 diabetes mellitus ([facility]).    Hypoglycemia  Diabetes type 2  -asymptomatic severe hypoglycemia  -Improved off long acting insulin. Continue low dose SSI.      Anemia chronic disease  -Transfuse as needed.    Paroxysmal atrial fibrillation-continue home Coreg and apixaban    ESRD- Nephrology following.    Hypothyroidism- TSH was high with a low T4. Repeating to confirm and consider increase in synthroid dosing although she is on a high dose for her weight. She has been on 300mcg since January and required IV replacement in the past. Endocrinology outpatient referral or follow up recommended.    Seizure disorder-continue home Keppra, renally dressed. She is on scheduled ativan currently. Last discharge, this was written prn. Will adjust. Jay reviewed.    Hypertension-continue home Coreg, amlodipine, hydralazine, lisinopril    RLS - continue home requip    Depression - continue home zoloft and desvenlafaxine    Hypophosphatemia:  -Replacement given per nephrology.  It was very low yesterday.  Awaiting labs today to monitor.    · SCDs for DVT prophylaxis.  · Full code.  · Anticipate discharge SNF/possibly tomorrow depending on  phosphorus trend and anemia      Alejo Weiss MD  [facility]  03/09/23  10:48 EST

## 2023-03-09 NOTE — PROGRESS NOTES
[facility] Progress Note      Patient Name: Zaina Martinez  : 1965  MRN: [id number]  Primary Care Physician:  Norman Serrato MD  Date of admission: 3/4/2023    Subjective     Interval History:   Follow up ESRD. Second  unit PRBC yesterday .  Eating better. On dialysis. Qb 400.  /75.      Review of Systems:   As noted above    Objective     Vitals:   Temp:  [96.5 °F (35.8 °C)-99.2 °F (37.3 °C)] 98.3 °F (36.8 °C)  Heart Rate:  [79-88] 88  Resp:  [16-18] 16  BP: (128-158)/(58-78) 158/78    Intake/Output Summary (Last 24 hours) at 3/9/2023 1037  Last data filed at 3/9/2023 0130  Gross per 24 hour   Intake 740 ml   Output --   Net 740 ml       Physical Exam:      General Appearance: Very Chronically ill, but looks better. On dialysis  .    Skin: warm and dry. pale  HEENT:oral mucosa moist.   Neck:RIJ TDC. Tunnel non-tender.   Lungs: decreased bs left lower lobe.   Heart: RRR, no s3 or rub  Abdomen: soft, nontender, nondistended, very active bs  Extremities: no edema.  Neuro: flat affect. . Moves all 4 ext.  Scheduled Meds:     amLODIPine, 10 mg, Oral, Q24H  aspirin, 81 mg, Oral, Daily  carvedilol, 3.125 mg, Oral, BID With Meals  Desvenlafaxine Succinate ER, 25 mg, Oral, Daily  epoetin brooke/brooke-epbx, 10,000 Units, Intravenous, Once per day on   epoetin brooke/brooke-epbx, 10,000 Units, Subcutaneous, Once  folic acid, 1 mg, Oral, Daily  hydrALAZINE, 50 mg, Oral, TID  insulin lispro, 0-7 Units, Subcutaneous, TID AC  levETIRAcetam, 250 mg, Oral, BID  levothyroxine, 300 mcg, Oral, Daily  lisinopril, 20 mg, Oral, Nightly  OLANZapine, 5 mg, Oral, BID  pantoprazole, 40 mg, Oral, Daily  rOPINIRole, 2 mg, Oral, Nightly  sertraline, 50 mg, Oral, Daily  sodium chloride, 10 mL, Intravenous, Q12H      IV Meds:        Results Reviewed:   I have personally reviewed the results from the time of this admission to 3/9/2023 10:37 EST     Results from last 7 days   Lab Units 23  0612  03/06/23  0451 03/05/23  0527 03/04/23  1047   SODIUM mmol/L 132* 132* 133* 129*   POTASSIUM mmol/L 4.5 4.7 4.0 4.2   CHLORIDE mmol/L 99 94* 94* 91*   CO2 mmol/L 25.5 25.3 31.0* 30.2*   BUN mg/dL 29* 29* 23* 19   CREATININE mg/dL 2.87* 3.50* 3.07* 2.38*   CALCIUM mg/dL 8.1* 7.9* 7.9* 8.2*   BILIRUBIN mg/dL  --   --   --  0.5   ALK PHOS U/L  --   --   --  161*   ALT (SGPT) U/L  --   --   --  5   AST (SGOT) U/L  --   --   --  25   GLUCOSE mg/dL 132* 143* 22* 143*       Estimated Creatinine Clearance: 18.6 mL/min (A) (by C-G formula based on SCr of 2.87 mg/dL (H)).    Results from last 7 days   Lab Units 03/08/23  0612 03/06/23 0451 03/05/23  0527   MAGNESIUM mg/dL  --  1.6 1.8   PHOSPHORUS mg/dL 0.7* 6.2* 2.0*             Results from last 7 days   Lab Units 03/09/23  0557 03/08/23  0612 03/07/23  0524 03/06/23  0451 03/05/23  0527   WBC 10*3/mm3 14.13* 12.15* 10.49 13.93* 14.44*   HEMOGLOBIN g/dL 8.4* 7.0* 6.4* 7.0* 7.4*   PLATELETS 10*3/mm3 247 252 215 238 276             Assessment / Plan     ASSESSMENT:  1. ESRD.  HD today, then short treatment tomorrow to get back on schedule tomorrow am.  Replacing PHos.  Check Vitamin D level and PTH.   2. Failure to thrive,  Multiple hospitalizations.  3. Anemia CKD>  One unit PRBC 3/7 and 3/8.   Epo with HD.  Hg better .  4. DM2 very labile  5. HTN controlled .  6. Hypothyroid with previously very elevated TSH.  Much improved .  7. Chronic systolic heart failure .  8. Left renal hematoma after left ureteral stent 12/30. Left renal artery embolization 1/1/23. Left stent removal 1/10/23.   PLAN:  1. HD today and again tomorrow to get back on MWF schedule . .  2. Could be dc tomorrow after HD if Hg stable .  3. Replace pHos IV .  Ruth Lira MD  03/09/23  10:37 EST    [facility]  [phone]

## 2023-03-09 NOTE — NURSING NOTE
Pt tolerated treatment well. Removed 2 kg. 1.9cc Heparin 1000 U instilled in each catheter lumen. Pt sleeping when left with primary nurse. No complaints.

## 2023-03-09 NOTE — PROGRESS NOTES
Continued Stay Note  [facility]     Patient Name: Zaina Martinez  MRN: [id number]  Today's Date: 3/9/2023    Admit Date: 3/4/2023    Plan: Return to [facility]   Discharge Plan     Row Name 03/09/23 1139       Plan    Plan Comments Per Gina with [facility], pre-cert is still pending    Row Name 03/09/23 1034       Plan    Plan Return to [facility] Pre-[facility]    Plan Comments Msg sent to Gina with [facility] regarding pre-cert.               Discharge Codes    No documentation.               Expected Discharge Date and Time     Expected Discharge Date Expected Discharge Time    Mar 10, 2023             Liset Beckman RN

## 2023-03-09 NOTE — PLAN OF CARE
Goal Outcome Evaluation:  Plan of Care Reviewed With: patient           Outcome Evaluation: VSS. PRBC 1 unit received . Alert and oriented. Norco given for RLE and back pain. Had  loose BM X1, small incontinent urine. pericare done, Z guard cream applied to redness/excoriated skin.  Turned to sides, self turning in  between. Oral intake fair.  For Dialysis today. Slept on and off.

## 2023-03-09 NOTE — PLAN OF CARE
Goal Outcome Evaluation:  Plan of Care Reviewed With: patient           Outcome Evaluation: Pt fatigued upon entry , actually had to awaken to try any PT; pt had dialysis and just finished recently; pt req MAX/DEP -2 for bed mobility, pt iraida LAQs x5 at EOB, but too weak to fully ext knees, unsafe to attempt STS tfer; pt moaning throughout session and said it was because she was tired, denied pain, will need SNU as pt is unsafe , falls risk for home currently, will see how pt progresses when not so fatigued    Patient was not wearing a face mask during this therapy encounter. Therapist used appropriate personal protective equipment including eye protection, mask, and gloves.  Mask used was standard procedure mask. Appropriate PPE was worn during the entire therapy session. Hand hygiene was completed before and after therapy session. Patient is not in enhanced droplet precautions.      Patricia Estrada, PT Tech present   WOUNDS

## 2023-03-10 LAB
25(OH)D3 SERPL-MCNC: 20.7 NG/ML (ref 30–100)
ALBUMIN SERPL-MCNC: 2 G/DL (ref 3.5–5.2)
ANION GAP SERPL CALCULATED.3IONS-SCNC: 10.1 MMOL/L (ref 5–15)
BACTERIA ISLT: NORMAL
BACTERIA SPEC AEROBE CULT: NORMAL
BACTERIA SPEC AEROBE CULT: NORMAL
BASOPHILS # BLD AUTO: 0.04 10*3/MM3 (ref 0–0.2)
BASOPHILS NFR BLD AUTO: 0.4 % (ref 0–1.5)
BUN SERPL-MCNC: 27 MG/DL (ref 6–20)
BUN/CREAT SERPL: 13.2 (ref 7–25)
CALCIUM SPEC-SCNC: 7.9 MG/DL (ref 8.6–10.5)
CHLORIDE SERPL-SCNC: 97 MMOL/L (ref 98–107)
CO2 SERPL-SCNC: 22.9 MMOL/L (ref 22–29)
CREAT SERPL-MCNC: 2.05 MG/DL (ref 0.57–1)
DEPRECATED RDW RBC AUTO: 53.6 FL (ref 37–54)
EGFRCR SERPLBLD CKD-EPI 2021: 27.8 ML/MIN/1.73
EOSINOPHIL # BLD AUTO: 0.04 10*3/MM3 (ref 0–0.4)
EOSINOPHIL NFR BLD AUTO: 0.4 % (ref 0.3–6.2)
ERYTHROCYTE [DISTWIDTH] IN BLOOD BY AUTOMATED COUNT: 17.3 % (ref 12.3–15.4)
GLUCOSE BLDC GLUCOMTR-MCNC: 203 MG/DL (ref 70–130)
GLUCOSE BLDC GLUCOMTR-MCNC: 227 MG/DL (ref 70–130)
GLUCOSE BLDC GLUCOMTR-MCNC: 270 MG/DL (ref 70–130)
GLUCOSE SERPL-MCNC: 223 MG/DL (ref 65–99)
HCT VFR BLD AUTO: 25.2 % (ref 34–46.6)
HGB BLD-MCNC: 7.7 G/DL (ref 12–15.9)
IMM GRANULOCYTES # BLD AUTO: 0.06 10*3/MM3 (ref 0–0.05)
IMM GRANULOCYTES NFR BLD AUTO: 0.6 % (ref 0–0.5)
LYMPHOCYTES # BLD AUTO: 1.13 10*3/MM3 (ref 0.7–3.1)
LYMPHOCYTES NFR BLD AUTO: 10.9 % (ref 19.6–45.3)
MCH RBC QN AUTO: 25.8 PG (ref 26.6–33)
MCHC RBC AUTO-ENTMCNC: 30.6 G/DL (ref 31.5–35.7)
MCV RBC AUTO: 84.6 FL (ref 79–97)
MONOCYTES # BLD AUTO: 0.99 10*3/MM3 (ref 0.1–0.9)
MONOCYTES NFR BLD AUTO: 9.5 % (ref 5–12)
NEUTROPHILS NFR BLD AUTO: 78.2 % (ref 42.7–76)
NEUTROPHILS NFR BLD AUTO: 8.13 10*3/MM3 (ref 1.7–7)
NRBC BLD AUTO-RTO: 0.1 /100 WBC (ref 0–0.2)
PHOSPHATE SERPL-MCNC: 1 MG/DL (ref 2.5–4.5)
PLATELET # BLD AUTO: 199 10*3/MM3 (ref 140–450)
PMV BLD AUTO: 9.8 FL (ref 6–12)
POTASSIUM SERPL-SCNC: 4.1 MMOL/L (ref 3.5–5.2)
PTH-INTACT SERPL-MCNC: 36.4 PG/ML (ref 15–65)
RBC # BLD AUTO: 2.98 10*6/MM3 (ref 3.77–5.28)
SODIUM SERPL-SCNC: 130 MMOL/L (ref 136–145)
WBC NRBC COR # BLD: 10.39 10*3/MM3 (ref 3.4–10.8)

## 2023-03-10 PROCEDURE — 80069 RENAL FUNCTION PANEL: CPT | Performed by: INTERNAL MEDICINE

## 2023-03-10 PROCEDURE — G0378 HOSPITAL OBSERVATION PER HR: HCPCS

## 2023-03-10 PROCEDURE — 82962 GLUCOSE BLOOD TEST: CPT

## 2023-03-10 PROCEDURE — 96366 THER/PROPH/DIAG IV INF ADDON: CPT

## 2023-03-10 PROCEDURE — 83970 ASSAY OF PARATHORMONE: CPT | Performed by: INTERNAL MEDICINE

## 2023-03-10 PROCEDURE — 25010000002 EPOETIN ALFA-EPBX 10000 UNIT/ML SOLUTION: Performed by: INTERNAL MEDICINE

## 2023-03-10 PROCEDURE — 63710000001 INSULIN LISPRO (HUMAN) PER 5 UNITS: Performed by: STUDENT IN AN ORGANIZED HEALTH CARE EDUCATION/TRAINING PROGRAM

## 2023-03-10 PROCEDURE — 82306 VITAMIN D 25 HYDROXY: CPT | Performed by: INTERNAL MEDICINE

## 2023-03-10 PROCEDURE — 97110 THERAPEUTIC EXERCISES: CPT

## 2023-03-10 PROCEDURE — 85025 COMPLETE CBC W/AUTO DIFF WBC: CPT | Performed by: STUDENT IN AN ORGANIZED HEALTH CARE EDUCATION/TRAINING PROGRAM

## 2023-03-10 PROCEDURE — G0257 UNSCHED DIALYSIS ESRD PT HOS: HCPCS

## 2023-03-10 RX ORDER — ERGOCALCIFEROL 1.25 MG/1
50000 CAPSULE ORAL
Status: DISCONTINUED | OUTPATIENT
Start: 2023-03-10 | End: 2023-03-11 | Stop reason: HOSPADM

## 2023-03-10 RX ORDER — MUPIROCIN CALCIUM 20 MG/G
1 CREAM TOPICAL EVERY 12 HOURS SCHEDULED
Status: DISCONTINUED | OUTPATIENT
Start: 2023-03-10 | End: 2023-03-11 | Stop reason: HOSPADM

## 2023-03-10 RX ADMIN — FOLIC ACID 1 MG: 1 TABLET ORAL at 12:11

## 2023-03-10 RX ADMIN — SERTRALINE 50 MG: 50 TABLET, FILM COATED ORAL at 12:10

## 2023-03-10 RX ADMIN — Medication 10 ML: at 20:34

## 2023-03-10 RX ADMIN — EPOETIN ALFA-EPBX 10000 UNITS: 10000 INJECTION, SOLUTION INTRAVENOUS; SUBCUTANEOUS at 09:11

## 2023-03-10 RX ADMIN — ACETAMINOPHEN 650 MG: 325 TABLET, FILM COATED ORAL at 06:37

## 2023-03-10 RX ADMIN — ASPIRIN 81 MG: 81 TABLET, COATED ORAL at 12:10

## 2023-03-10 RX ADMIN — AMLODIPINE BESYLATE 10 MG: 10 TABLET ORAL at 12:11

## 2023-03-10 RX ADMIN — CARVEDILOL 3.12 MG: 3.12 TABLET, FILM COATED ORAL at 12:11

## 2023-03-10 RX ADMIN — INSULIN LISPRO 4 UNITS: 100 INJECTION, SOLUTION INTRAVENOUS; SUBCUTANEOUS at 17:37

## 2023-03-10 RX ADMIN — INSULIN LISPRO 3 UNITS: 100 INJECTION, SOLUTION INTRAVENOUS; SUBCUTANEOUS at 12:10

## 2023-03-10 RX ADMIN — MUPIROCIN 1 APPLICATION: 2 CREAM TOPICAL at 20:33

## 2023-03-10 RX ADMIN — PANTOPRAZOLE SODIUM 40 MG: 40 TABLET, DELAYED RELEASE ORAL at 05:56

## 2023-03-10 RX ADMIN — MUPIROCIN 1 APPLICATION: 2 CREAM TOPICAL at 14:35

## 2023-03-10 RX ADMIN — LEVOTHYROXINE SODIUM 300 MCG: 0.15 TABLET ORAL at 05:55

## 2023-03-10 RX ADMIN — LEVETIRACETAM 250 MG: 250 TABLET, FILM COATED ORAL at 12:10

## 2023-03-10 RX ADMIN — LEVETIRACETAM 250 MG: 250 TABLET, FILM COATED ORAL at 20:33

## 2023-03-10 RX ADMIN — HYDRALAZINE HYDROCHLORIDE 50 MG: 50 TABLET, FILM COATED ORAL at 12:11

## 2023-03-10 RX ADMIN — SODIUM PHOSPHATE, MONOBASIC, MONOHYDRATE AND SODIUM PHOSPHATE, DIBASIC, ANHYDROUS 15 MMOL: 276; 142 INJECTION, SOLUTION INTRAVENOUS at 11:45

## 2023-03-10 RX ADMIN — CARVEDILOL 3.12 MG: 3.12 TABLET, FILM COATED ORAL at 17:36

## 2023-03-10 RX ADMIN — LISINOPRIL 20 MG: 20 TABLET ORAL at 20:33

## 2023-03-10 RX ADMIN — INSULIN LISPRO 3 UNITS: 100 INJECTION, SOLUTION INTRAVENOUS; SUBCUTANEOUS at 06:37

## 2023-03-10 RX ADMIN — ERGOCALCIFEROL 50000 UNITS: 1.25 CAPSULE ORAL at 12:10

## 2023-03-10 RX ADMIN — ROPINIROLE 2 MG: 2 TABLET, FILM COATED ORAL at 20:33

## 2023-03-10 RX ADMIN — DESVENLAFAXINE SUCCINATE 25 MG: 25 TABLET, EXTENDED RELEASE ORAL at 12:11

## 2023-03-10 RX ADMIN — Medication 3 MG: at 20:32

## 2023-03-10 RX ADMIN — HYDROCODONE BITARTRATE AND ACETAMINOPHEN 1 TABLET: 5; 325 TABLET ORAL at 09:11

## 2023-03-10 RX ADMIN — Medication 10 ML: at 09:11

## 2023-03-10 NOTE — NURSING NOTE
HD treatment completed per md order. 1.3L of fluid removed without difficulty. Post treatment bp 151/69, wt 57.9 kg. Report given to  Floor RN.

## 2023-03-10 NOTE — PLAN OF CARE
Goal Outcome Evaluation:  Plan of Care Reviewed With: patient        Progress: no change  Outcome Evaluation: pt had HD this morning, pt c/o leg pain, medicated x1 for pain, assisted to chair, vss, monitoring blood sugars, insluin given as SSI, phosphorus replaced today, right hand abscess scab removed - Bactroban ointment applied and covered with bandaid   O-L Flap Text: The defect edges were debeveled with a #15 scalpel blade.  Given the location of the defect, shape of the defect and the proximity to free margins an O-L flap was deemed most appropriate.  Using a sterile surgical marker, an appropriate advancement flap was drawn incorporating the defect and placing the expected incisions within the relaxed skin tension lines where possible.    The area thus outlined was incised deep to adipose tissue with a #15 scalpel blade.  The skin margins were undermined to an appropriate distance in all directions utilizing iris scissors.

## 2023-03-10 NOTE — PROGRESS NOTES
Nutrition Services    Patient Name:  Zaina Martinez  YOB: 1965  MRN: [id number]  Admit Date:  3/4/2023    RD briefly visited patient to see how meals were going and if she has been receiving novasource renal TID. She reports her appetite is improving and she has been receiving and drinking her ONS. Will continue to follow     Electronically signed by:  Radha Pinzon RD  03/10/23 15:58 EST

## 2023-03-10 NOTE — PLAN OF CARE
Goal Outcome Evaluation:  Plan of Care Reviewed With: patient        Progress: improving  Outcome Evaluation: Pt improved from last session, more alert after waking up from nap in chair, pt c/o fatigue throughout session, but willing to work ; pt iraida STS x2, MOD 2 w/rwx, LE exer x10 reps, then chair to bed pivot w/MOD-2; pt fatigued so did not try taking steps this session, HD earlier and up in chair a while w/nsg upon arrival, pt plans SNU at [place]    Patient was not wearing a face mask during this therapy encounter. Therapist used appropriate personal protective equipment including eye protection, mask, and gloves.  Mask used was standard procedure mask. Appropriate PPE was worn during the entire therapy session. Hand hygiene was completed before and after therapy session. Patient is not in enhanced droplet precautions.

## 2023-03-10 NOTE — PROGRESS NOTES
Continued Stay Note  [facility]     Patient Name: Zaina Martinez  MRN: [id number]  Today's Date: 3/10/2023    Admit Date: 3/4/2023    Plan: Return to [facility] Pre- cet obtained   Discharge Plan     Row Name 03/10/23 1545       Plan    Plan Return to [facility] Pre- cet obtained    Plan Comments Per MD nav [facility] tomorrow. Transportation scheduled with [employer] for 2pm on 3/11. Confirmaton # [id number]. Informed MD, RN, patient and Gina with [facility]. .               Discharge Codes    No documentation.               Expected Discharge Date and Time     Expected Discharge Date Expected Discharge Time    Mar 11, 2023             Liset Beckman, RN

## 2023-03-10 NOTE — PLAN OF CARE
Goal Outcome Evaluation:  Plan of Care Reviewed With: patient        Progress: improving  Outcome Evaluation: Pt resting on and off this evening, pt self turning at times but also getting help from staff, heels and buttock pink and blanchable- mepilex to coccyx and heels, zguard applied to bo area, pt has been instructed on the importance of turning, heels elevated, pt refuses SCD's but is aware of the importance, contact and falls precautions maintained, VSS,, PO pain medication given with relief, monitoring blood sugars

## 2023-03-10 NOTE — PROGRESS NOTES
Name: Zaina Martinez ADMIT: 3/4/2023   : 1965  PCP: Norman Serrato MD    MRN: [id number] LOS: 0 days   AGE/SEX: 57 y.o. female  ROOM: [facility]     Subjective   Subjective   Patient seen this morning, no acute events overnight.  Undergoing dialysis in the room.  Denies shortness of breath, fevers, chills nausea or vomigint    Review of Systems   as above   Objective   Objective   Vital Signs  Temp:  [98 °F (36.7 °C)-99.9 °F (37.7 °C)] 98.5 °F (36.9 °C)  Heart Rate:  [76-93] 89  Resp:  [16] 16  BP: (132-171)/(68-82) 132/69  SpO2:  [91 %-99 %] 98 %  on   ;   Device (Oxygen Therapy): room air  Body mass index is 23.35 kg/m².  Physical Exam    General: Alert alert, undergoing dialysis, not in distress, ill-appearing  HEENT: Normocephalic, atraumatic  CV: Regular rate and rhythm, no murmurs rubs or gallops  Lungs: Clear to auscultation bilaterally, no crackles or wheezes  Abdomen: Soft, nontender, nondistended  Extremities: Right hand dorsal surface with small induration with scab,    Results Review     I reviewed the patient's new clinical results.  Results from last 7 days   Lab Units 03/10/23  0534 23  0557 23  0612 23  0524   WBC 10*3/mm3 10.39 14.13* 12.15* 10.49   HEMOGLOBIN g/dL 7.7* 8.4* 7.0* 6.4*   PLATELETS 10*3/mm3 199 247 252 215     Results from last 7 days   Lab Units 03/10/23  0534 23  0557 23  0612 23  0451   SODIUM mmol/L 130* 133* 132* 132*   POTASSIUM mmol/L 4.1 5.5* 4.5 4.7   CHLORIDE mmol/L 97* 99 99 94*   CO2 mmol/L 22.9 19.6* 25.5 25.3   BUN mg/dL 27* 45* 29* 29*   CREATININE mg/dL 2.05* 3.36* 2.87* 3.50*   GLUCOSE mg/dL 223* 381* 132* 143*   Estimated Creatinine Clearance: 27.7 mL/min (A) (by C-G formula based on SCr of 2.05 mg/dL (H)).  Results from last 7 days   Lab Units 03/10/23  0534 23  0557 23  0612 23  0451 23  0527 23  1047   ALBUMIN g/dL 2.0* 2.3* 2.2* 2.2*   < > 2.7*   BILIRUBIN mg/dL  --   --   --   --   --  0.5    ALK PHOS U/L  --   --   --   --   --  161*   AST (SGOT) U/L  --   --   --   --   --  25   ALT (SGPT) U/L  --   --   --   --   --  5    < > = values in this interval not displayed.     Results from last 7 days   Lab Units 03/10/23  0534 03/09/23  0557 03/08/23  0612 03/06/23  0451 03/05/23  0527   CALCIUM mg/dL 7.9* 8.3* 8.1* 7.9* 7.9*   ALBUMIN g/dL 2.0* 2.3* 2.2* 2.2* 2.3*   MAGNESIUM mg/dL  --   --   --  1.6 1.8   PHOSPHORUS mg/dL 1.0* 1.0* 0.7* 6.2* 2.0*     Results from last 7 days   Lab Units 03/04/23  1047   PROCALCITONIN ng/mL 1.76*     COVID19   Date Value Ref Range Status   02/05/2023 Not Detected Not Detected - Ref. Range Final   02/01/2023 Not Detected Not Detected - Ref. Range Final     Glucose   Date/Time Value Ref Range Status   03/10/2023 1140 203 (H) 70 - 130 mg/dL Final     Comment:     Meter: [id number] : [id number] Geemicky Bates NA   03/10/2023 0600 227 (H) 70 - 130 mg/dL Final     Comment:     RN Notified R and V Meter: [id number] : [id number] Cyril Rocio NA   03/09/2023 1708 146 (H) 70 - 130 mg/dL Final     Comment:     Meter: [id number] : [id number] Lobmaxnog Remenia NA   03/09/2023 1059 179 (H) 70 - 130 mg/dL Final     Comment:     Meter: [id number] : [id number] Lobosnog Remenia NA   03/09/2023 0556 376 (H) 70 - 130 mg/dL Final     Comment:     Meter: [id number] : [id number] Ira Varma NA   03/08/2023 1708 227 (H) 70 - 130 mg/dL Final     Comment:     Meter: [id number] : [id number] Jessica Bello QUENTIN   03/08/2023 1153 184 (H) 70 - 130 mg/dL Final     Comment:     Meter: [id number] : [id number] Jacki Venessa QUENTIN       XR Chest 1 View  ONE VIEW PORTABLE CHEST AT 11:25 AM     HISTORY: Hypoglycemia. Leukocytosis. Chronic renal failure.     FINDINGS: The lungs are well-expanded with persistent increased density  in the lower left chest that obscures the diaphragm and consistent with  moderately large left pleural effusion and adjacent dense atelectasis as  also  noted on the chest CT scan dated 02/08/2023. This is also similar  to the chest x-ray dated 02/05/2023. The right lung remains clear. The  heart remains enlarged. A large gauge right-sided vascular catheter ends  in the right atrium without change.     This report was finalized on 3/4/2023 11:51 AM by Dr. Ronnie Ritchie M.D.       Scheduled Medications  amLODIPine, 10 mg, Oral, Q24H  aspirin, 81 mg, Oral, Daily  carvedilol, 3.125 mg, Oral, BID With Meals  Desvenlafaxine Succinate ER, 25 mg, Oral, Daily  epoetin brooke/brooke-epbx, 10,000 Units, Intravenous, Once per day on Mon Wed Fri  folic acid, 1 mg, Oral, Daily  hydrALAZINE, 50 mg, Oral, TID  insulin lispro, 0-7 Units, Subcutaneous, TID AC  levETIRAcetam, 250 mg, Oral, BID  levothyroxine, 300 mcg, Oral, Daily  lisinopril, 20 mg, Oral, Nightly  mupirocin, 1 application, Topical, Q12H  OLANZapine, 5 mg, Oral, BID  pantoprazole, 40 mg, Oral, Daily  rOPINIRole, 2 mg, Oral, Nightly  sertraline, 50 mg, Oral, Daily  sodium chloride, 10 mL, Intravenous, Q12H  sodium phosphate IVPB, 15 mmol, Intravenous, Once  vitamin D, 50,000 Units, Oral, Q7 Days    Infusions   Diet  Diet: Cardiac Diets, Diabetic Diets, Renal Diets; Healthy Heart (2-3 Na+); Consistent Carbohydrate; Low Sodium (2-3g), Low Potassium, Low Phosphorus; Texture: Regular Texture (IDDSI 7); Fluid Consistency: Thin (IDDSI 0)       Assessment/Plan     Active Hospital Problems    Diagnosis  POA   • **Hypoglycemia due to type 2 diabetes mellitus (HCC) [E11.649]  Yes   • Moderate malnutrition (HCC) [E44.0]  Yes   • Ureteral stent present [Z96.0]  Not Applicable   • Pleural effusion [J90]  Yes   • History of stroke- R MCA s/p TPA with subsequent ICH with debility [Z86.73]  Not Applicable   • Hyponatremia [E87.1]  Yes   • ESRD (end stage renal disease) (HCC) [N18.6]  Yes   • Abnormal urinalysis [R82.90]  Yes   • Hypothyroidism [E03.9]  Yes   • Rheumatoid arthritis (HCC) [M06.9]  Yes   • Type 2 diabetes mellitus with  kidney complication, with long-term current use of insulin (HCC) [E11.29, Z79.4]  Not Applicable   • Leukocytosis [D72.829]  Yes   • Anemia [D64.9]  Yes      Resolved Hospital Problems   No resolved problems to display.        57 y.o. female admitted with Hypoglycemia due to type 2 diabetes mellitus (HCC).     Hypoglycemia  Diabetes type 2  -Blood glucose level was 19 admission.  -asymptomatic severe hypoglycemia  -Improved off long acting insulin. Continue low dose SSI.    Blood glucose on the higher side now.  Continue to monitor.     Anemia chronic disease  -Transfuse as needed.     Paroxysmal atrial fibrillation-continue home Coreg and  Aspirin      ESRD- Nephrology following.     Hypothyroidism- TSH was high with a low T4. Repeating to confirm and consider increase in synthroid dosing although she is on a high dose for her weight. She has been on 300mcg since January and required IV replacement in the past. Endocrinology outpatient referral or follow up recommended.     Seizure disorder-continue home Keppra, renally dressed.  PRN ativan.       Hypertension-continue home Coreg, amlodipine, hydralazine, lisinopril     RLS - continue home requip     Depression - continue home zoloft and desvenlafaxine     Hypophosphatemia:  -Replacement given per nephrology.    1.0 this morning.  Management per nephrology.  Vitamin D deficiency:     Vitamin D 20.7.  Started on vitamin D replacement.    Small erythema/induration right hand dorsal surface.  Approximately one 1 cm in diameter..  Discussed with RN to remove the scab and try to express any discharge from the wound if able..  Topical mupirocin twice daily.  Monitor, if worsening/not improving, may start on oral antibiotics.         • SCDs for DVT prophylaxis.  • Full code.  • Anticipate discharge SNF, tomorrow.      I wore protective equipment throughout this patient encounter including a face mask, gloves and protective eyewear.  Hand hygiene was performed before  donning protective equipment and after removal when leaving the room.      Dictated utilizing Dragon dictation        Albert Templeton MD  [facility]  03/10/23  15:11 EST

## 2023-03-10 NOTE — THERAPY TREATMENT NOTE
Patient Name: Zaina Martinez  : 1965    MRN: [id number]                              Today's Date: 3/10/2023       Admit Date: 3/4/2023    Visit Dx:     ICD-10-CM ICD-9-CM   1. Hypoglycemia due to type 2 diabetes mellitus ([facility])  E11.649 250.80   2. Leukocytosis, unspecified type  D72.829 288.60   3. Rheumatoid arthritis, involving unspecified site, unspecified whether rheumatoid factor present ([facility])  M06.9 714.0   4. ESRD (end stage renal disease) ([facility])  N18.6 585.6     Patient Active Problem List   Diagnosis   • Renal insufficiency   • Hypertensive disorder   • Hypothyroidism   • Type 2 diabetes mellitus with kidney complication, with long-term current use of insulin ([facility])   • Rheumatoid arthritis ([facility])   • Angioedema   • Esophageal dysmotility   • Anemia   • Medically noncompliant   • Myocardial infarction due to demand ischemia ([facility])   • Enteritis   • PRES (posterior reversible encephalopathy syndrome)   • Urine retention   • Klebsiella infection   • Superficial thrombophlebitis   • Generalized weakness   • ESRD (end stage renal disease) ([facility])   • CAD (coronary artery disease)   • Abnormal urinalysis   • Chronic diastolic CHF (congestive heart failure) ([facility])   • Pyelonephritis   • Calculus of gallbladder with acute on chronic cholecystitis without obstruction   • Pleural effusion on right   • Anemia due to chronic kidney disease, on chronic dialysis ([facility])   • Abnormal findings on diagnostic imaging of other specified body structures   • Acute upper respiratory infection   • Agitation   • Alkaline phosphatase raised   • Casts present in urine   • Cellulitis of toe   • Hip pain   • Community acquired pneumonia   • Depressive disorder   • Diarrhea of presumed infectious origin   • Difficult or painful urination   • Disease due to severe acute respiratory syndrome coronavirus 2 (SARS-CoV-2)   • Dyspnea   • Encounter for follow-up examination after completed treatment for conditions other than malignant neoplasm    • H/O: hypothyroidism   • Hyperlipidemia   • Hypomagnesemia   • Intractable vomiting with nausea   • Leukocytosis   • Luetscher's syndrome   • Need for influenza vaccination   • Restless legs   • Noncompliance with treatment   • Shoulder pain   • Acute UTI (urinary tract infection)   • Metabolic encephalopathy   • Abnormal findings on diagnostic imaging of abdomen   • Status post cholecystectomy   • Hyponatremia   • Acute metabolic encephalopathy   • Encephalopathy, toxic   • Acute CVA (cerebrovascular accident) ([facility])   • History of intracranial hemorrhage   • Stroke ([facility]CC)   • Abnormal urinalysis   • Diabetic muscle infarction ([facility]CC)   • Other insomnia   • Altered mental status   • History of stroke- R MCA s/p TPA with subsequent ICH with debility   • Heart murmur   • Bradycardia   • Left lower lobe pneumonia   • Metabolic encephalopathy   • Pericardial effusion   • Pleural effusion   • Acute pulmonary edema (HCC)   • Atrial flutter (HCC)   • Chronic systolic CHF (congestive heart failure) ([facility])   • Thrombus of venous dialysis catheter ([facility])   • Sepsis without acute organ dysfunction (HCC)   • Type 2 diabetes mellitus with hyperglycemia, with long-term current use of insulin (HCC)   • Acute respiratory failure with hypoxia (HCC)   • Acute on chronic systolic CHF (congestive heart failure) ([facility])   • Hyperkalemia   • Acute respiratory failure with hypoxia (HCC)   • Other hypervolemia   • Hematoma of right hip, initial encounter   • Ureteral stent present   • Closed intertrochanteric fracture of right femur ([facility])   • Witnessed seizure-like activity ([facility])   • Acute respiratory failure with hypercapnia (HCC)   • Opioid use   • Diabetic muscle infarction (HCC)   • Acute posthemorrhagic anemia   • Kidney hematoma   • Pyuria   • Severe malnutrition (HCC)   • Hypoglycemia due to type 2 diabetes mellitus (HCC)   • Moderate malnutrition (HCC)     Past Medical History:   Diagnosis Date   • Acute CVA (cerebrovascular accident)  ([facility]) 05/01/2022   • Acute on chronic diastolic CHF (congestive heart failure) ([facility])    • Anemia    • CAD (coronary artery disease) 12/20/2021   • Diabetes ([facility])    • Disease of thyroid gland    • GERD (gastroesophageal reflux disease)    • History of intracranial hemorrhage 5/1/2022   • Hyperlipidemia 11/30/2018   • Hypertension    • Rheumatoid arthritis ([facility])    • Stage 5 chronic kidney disease (HCC)      Past Surgical History:   Procedure Laterality Date   • CHOLECYSTECTOMY WITH INTRAOPERATIVE CHOLANGIOGRAM N/A 1/10/2022    Procedure: Laparoscopic cholecystectomy with intraoperative cholangiogram;  Surgeon: Ramana Raygoza MD;  Location: [facility] OR;  Service: General;  Laterality: N/A;   • CYSTOSCOPY W/ URETERAL STENT PLACEMENT Left 9/29/2022    Procedure: CYSTOSCOPY URETERAL STENT INSERTION WITH RETROGRADE PYELOGRAM;  Surgeon: Bryant Phan Jr., MD;  Location: [facility] OR;  Service: Urology;  Laterality: Left;   • CYSTOSCOPY W/ URETERAL STENT PLACEMENT Left 1/10/2023    Procedure: CYSTOSCOPY LEFT STENT REMOVAL;  Surgeon: Bryant Phan Jr., MD;  Location: [facility] OR;  Service: Urology;  Laterality: Left;   • EMBOLIZATION MESENTERIC ARTERY N/A 1/1/2023    Procedure: LEFT KIDNEY EMBOLIZATION;  Surgeon: Bijan Ordoñez MD;  Location: [facility] OR 18/19;  Service: Interventional Radiology;  Laterality: N/A;   • EYE SURGERY     • FEMUR IM NAILING/RODDING Right 11/10/2022    Procedure: FEMUR INTRAMEDULLARY NAILING/RODDING;  Surgeon: Glen Pierce MD;  Location: [facility] OR;  Service: Orthopedics;  Laterality: Right;   • HIP INTERTROCHANTERIC NAILING Right 11/10/2022    Procedure: HIP INTERTROCHANTERIC NAILING;  Surgeon: Glen Pierce MD;  Location: [facility] OR;  Service: Orthopedics;  Laterality: Right;   • HYSTERECTOMY     • INSERT CENTRAL LINE AT BEDSIDE  12/31/2022        • INSERTION HEMODIALYSIS CATHETER N/A 12/6/2021    Procedure: HEMODIALYSIS CATHETER  INSERTION;  Surgeon: Keli Salazar MD;  Location: [facility] OR 18/19;  Service: Vascular;  Laterality: N/A;   • INSERTION HEMODIALYSIS CATHETER N/A 5/3/2022    Procedure: TUNNELED CATHETER PLACEMENT;  Surgeon: Keli Salazar MD;  Location: [facility] MAIN OR;  Service: Vascular;  Laterality: N/A;   • MUSCLE BIOPSY Left 6/15/2022    Procedure: Left quadriceps muscle biopsy;  Surgeon: Marques Ma MD;  Location: [facility] OR;  Service: Neurosurgery;  Laterality: Left;   • URETEROSCOPY LASER LITHOTRIPSY WITH STENT INSERTION Left 12/30/2022    Procedure: CYSTOSCOPY WITH LEFT  URETEROSCOPY  LEFT STENT EXCHANGE;  Surgeon: Bryant Phan Jr., MD;  Location: [facility] OR;  Service: Urology;  Laterality: Left;      General Information     Row Name 03/10/23 1552          Physical Therapy Time and Intention    Document Type therapy note (daily note)  -     Mode of Treatment individual therapy;physical therapy  -     Row Name 03/10/23 1552          General Information    Patient Profile Reviewed yes  -     Existing Precautions/Restrictions fall  -     Row Name 03/10/23 1552          Cognition    Orientation Status (Cognition) oriented x 3  -     Row Name 03/10/23 1552          Safety Issues, Functional Mobility    Safety Issues Affecting Function (Mobility) problem-solving;safety precaution awareness  -     Impairments Affecting Function (Mobility) balance;coordination;endurance/activity tolerance;strength  -           User Key  (r) = Recorded By, (t) = Taken By, (c) = Cosigned By    Initials Name Provider Type     Farida Baires PTA Physical Therapist Assistant               Mobility     Row Name 03/10/23 1551          Bed Mobility    Bed Mobility sit-supine  -     Sit-Supine [place] (Bed Mobility) 2 person assist;minimum assist (75% patient effort);moderate assist (50% patient effort)  -     Comment, (Bed Mobility) pt more alert , much improved this session  -     [place]  Name 03/10/23 1553          Bed-Chair Transfer    Bed-Chair [place] (Transfers) 2 person assist;moderate assist (50% patient effort);verbal cues;nonverbal cues (demo/gesture)  -     Comment, (Bed-Chair Transfer) HHA-2, chair >bed  -     Row Name 03/10/23 1553          Sit-Stand Transfer    Sit-Stand [place] (Transfers) 2 person assist;moderate assist (50% patient effort);verbal cues;nonverbal cues (demo/gesture)  -     Assistive Device (Sit-Stand Transfers) walker, front-wheeled  -     Comment, (Sit-Stand Transfer) perf x2, situated in chair , then decided she wanted back to bed  -           User Key  (r) = Recorded By, (t) = Taken By, (c) = Cosigned By    Initials Name Provider Type    Farida Stoner PTA Physical Therapist Assistant               Obj/Interventions     Row Name 03/10/23 1555          Motor Skills    Therapeutic Exercise --  LAQs x10 reps  -     Row Name 03/10/23 1555          Balance    Static Sitting Balance minimal assist  -           User Key  (r) = Recorded By, (t) = Taken By, (c) = Cosigned By    Initials Name Provider Type    Farida Stoner PTA Physical Therapist Assistant               Goals/Plan    No documentation.                Clinical Impression     Row Name 03/10/23 1556          Pain    Pretreatment Pain Rating 0/10 - no pain  -     Posttreatment Pain Rating 0/10 - no pain  -     Row Name 03/10/23 1556          Plan of Care Review    Plan of Care Reviewed With patient  -     Progress improving  -     Outcome Evaluation Pt improved from last session, more alert after waking up from nap in chair, pt c/o fatigue throughout session, but willing to work ; pt iraida STS x2, MOD 2 w/rwx, LE exer x10 reps, then chair to bed pivot w/MOD-2; pt fatigued so did not try taking steps this session, HD earlier and up in chair a while w/nsg upon arrival, pt plans [facility]     Row Name 03/10/23 1556          Therapy Assessment/Plan (PT)    Rehab Potential  (PT) fair, will monitor progress closely  -     Criteria for Skilled Interventions Met (PT) yes  -     Row Name 03/10/23 1556          Vital Signs    O2 Delivery Pre Treatment room air  -     Row Name 03/10/23 1556          Positioning and Restraints    Pre-Treatment Position sitting in chair/recliner  -     Post Treatment Position bed  -JM     In Bed fowlers;call light within reach;encouraged to call for assist;exit alarm on;side rails up x3;heels elevated;SCD pump applied;notified nsg  -           User Key  (r) = Recorded By, (t) = Taken By, (c) = Cosigned By    Initials Name Provider Type    Farida Stoner PTA Physical Therapist Assistant               Outcome Measures     Row Name 03/10/23 1600 03/10/23 0910       How much help from another person do you currently need...    Turning from your back to your side while in flat bed without using bedrails? 3  - 3  -MA    Moving from lying on back to sitting on the side of a flat bed without bedrails? 2  -VIC 3  -MA    Moving to and from a bed to a chair (including a wheelchair)? 2  - 2  -MA    Standing up from a chair using your arms (e.g., wheelchair, bedside chair)? 2  -VIC 2  -MA    Climbing 3-5 steps with a railing? 1  -VIC 2  -MA    To walk in hospital room? 1  -VIC 2  -MA    AM-PAC 6 Clicks Score (PT) 11  -VIC 14  -MA    Highest level of mobility 4 --> Transferred to chair/commode  - 4 --> Transferred to chair/commode  -MA          User Key  (r) = Recorded By, (t) = Taken By, (c) = Cosigned By    Initials Name Provider Type    Tiesha Hampton, RN Registered Nurse    Farida Stoner PTA Physical Therapist Assistant                             Physical Therapy Education     Title: PT OT SLP Therapies (Done)     Topic: Physical Therapy (Done)     Point: Mobility training (Done)     Learning Progress Summary           Patient Acceptance, E,TB,D, VU,NR by VIC at 3/10/2023 1601    Acceptance, E,D, NR by VIC at 3/9/2023 1642    Acceptance,  E, VU,NR by  at 3/6/2023 1015                   Point: Home exercise program (Done)     Learning Progress Summary           Patient Acceptance, E,TB,D, VU,NR by  at 3/10/2023 1601    Acceptance, E,D, NR by  at 3/9/2023 1642                   Point: Body mechanics (Done)     Learning Progress Summary           Patient Acceptance, E,TB,D, VU,NR by  at 3/10/2023 1601    Acceptance, E,D, NR by  at 3/9/2023 1642    Acceptance, E, VU,NR by  at 3/6/2023 1015                   Point: Precautions (Done)     Learning Progress Summary           Patient Acceptance, E,TB,D, VU,NR by  at 3/10/2023 1601    Acceptance, E,D, NR by  at 3/9/2023 1642    Acceptance, E, VU,NR by  at 3/6/2023 1015                               User Key     Initials Effective Dates Name Provider Type [facility] 03/07/18 -  Farida Baires PTA Physical Therapist Assistant PT     05/02/22 -  Aviva Salgado, VICKIE Physical Therapist PT              PT Recommendation and Plan     Plan of Care Reviewed With: patient  Progress: improving  Outcome Evaluation: Pt improved from last session, more alert after waking up from nap in chair, pt c/o fatigue throughout session, but willing to work ; pt iraida STS x2, MOD 2 w/rwx, LE exer x10 reps, then chair to bed pivot w/MOD-2; pt fatigued so did not try taking steps this session, HD earlier and up in chair a while w/nsg upon arrival, pt [facility] SN[facility]     Time Calculation:    PT Charges     Row Name 03/10/23 1551             Time Calculation    Start Time 1440  -      Stop Time 1503  -      Time Calculation (min) 23 min  -      PT Received On 03/10/23  -      PT - Next Appointment 03/13/23  -            User Key  (r) = Recorded By, (t) = Taken By, (c) = Cosigned By    Initials Name Provider Type     Farida Baires PTA Physical Therapist Assistant              Therapy Charges for Today     Code Description Service Date Service Provider Modifiers Qty    80438575031  PT THER  PROC EA 15 MIN 3/9/2023 Farida Baires, PTA GP 1    32051882745 HC PT THER SUPP EA 15 MIN 3/9/2023 Farida Baires, PTA GP 1    14226121904 HC PT THER PROC EA 15 MIN 3/10/2023 Farida Baires, PTA GP 2    90423846867 HC PT THER SUPP EA 15 MIN 3/10/2023 Farida Baires, PTA GP 2          PT G-Codes  Outcome Measure Options: AM-PAC 6 Clicks Basic Mobility (PT)  AM-PAC 6 Clicks Score (PT): 11  PT Discharge Summary  Anticipated Discharge Disposition (PT): skilled nursing facility    Farida Baires PTA  3/10/2023

## 2023-03-10 NOTE — PROGRESS NOTES
Continued Stay Note  [facility]     Patient Name: Zaina Martinez  MRN: [id number]  Today's Date: 3/10/2023    Admit Date: 3/4/2023    Plan: Return to [facility] [place] Pre-Cert Obtained (expires on 3/13 at 11:45pm)   Discharge Plan     Row Name 03/10/23 1217       Plan    Plan Return to [facility] [place] Pre-Cert Obtained (expires on 3/13 at 11:45pm)    Plan Comments Per Gina with [facility], pre-cert obtained for [facility] [place]. Pre-cert expires on 3/13 at 11:45pm. Stated patient will not recieve HD until Monday. Patient will not need a COVID test. Informed MD who stated will DC tomorrow. Updated Gina with [facility] and RN               Discharge Codes    No documentation.               Expected Discharge Date and Time     Expected Discharge Date Expected Discharge Time    Mar 11, 2023             Liset Beckman, RN

## 2023-03-10 NOTE — PLAN OF CARE
Goal Outcome Evaluation:  Plan of Care Reviewed With: patient        Progress: improving  Outcome Evaluation: Pt alert and pleasant, states she feels better today. dialysis this AM and iraida well. good appetite and drank supplement provided with meals. unable to get oob or even sit unassisted on eob with PT. heels pink and blanchable, enc floating or elbow protectors to heels. perineum excoriated, loose BM today. z-guard applied. mepilex to coccyx. enc turning, but pt refuses; instructed on importance of repositioning freq. IV phosphorus replaced per order.

## 2023-03-11 VITALS
TEMPERATURE: 98.5 F | SYSTOLIC BLOOD PRESSURE: 147 MMHG | DIASTOLIC BLOOD PRESSURE: 75 MMHG | BODY MASS INDEX: 23.49 KG/M2 | WEIGHT: 127.65 LBS | HEART RATE: 89 BPM | HEIGHT: 62 IN | OXYGEN SATURATION: 98 % | RESPIRATION RATE: 16 BRPM

## 2023-03-11 PROBLEM — E83.39 HYPOPHOSPHATEMIA: Status: ACTIVE | Noted: 2023-03-11

## 2023-03-11 PROBLEM — E11.649 HYPOGLYCEMIA DUE TO TYPE 2 DIABETES MELLITUS (HCC): Status: RESOLVED | Noted: 2023-03-04 | Resolved: 2023-03-11

## 2023-03-11 LAB
ALBUMIN SERPL-MCNC: 2.3 G/DL (ref 3.5–5.2)
ANION GAP SERPL CALCULATED.3IONS-SCNC: 11.6 MMOL/L (ref 5–15)
BUN SERPL-MCNC: 24 MG/DL (ref 6–20)
BUN/CREAT SERPL: 14.7 (ref 7–25)
CALCIUM SPEC-SCNC: 8.2 MG/DL (ref 8.6–10.5)
CHLORIDE SERPL-SCNC: 95 MMOL/L (ref 98–107)
CO2 SERPL-SCNC: 26.4 MMOL/L (ref 22–29)
CREAT SERPL-MCNC: 1.63 MG/DL (ref 0.57–1)
DEPRECATED RDW RBC AUTO: 49.8 FL (ref 37–54)
EGFRCR SERPLBLD CKD-EPI 2021: 36.6 ML/MIN/1.73
ERYTHROCYTE [DISTWIDTH] IN BLOOD BY AUTOMATED COUNT: 17.3 % (ref 12.3–15.4)
GLUCOSE BLDC GLUCOMTR-MCNC: 157 MG/DL (ref 70–130)
GLUCOSE BLDC GLUCOMTR-MCNC: 287 MG/DL (ref 70–130)
GLUCOSE SERPL-MCNC: 162 MG/DL (ref 65–99)
HCT VFR BLD AUTO: 27.4 % (ref 34–46.6)
HGB BLD-MCNC: 8.5 G/DL (ref 12–15.9)
MAGNESIUM SERPL-MCNC: 1.8 MG/DL (ref 1.6–2.6)
MCH RBC QN AUTO: 25.1 PG (ref 26.6–33)
MCHC RBC AUTO-ENTMCNC: 31 G/DL (ref 31.5–35.7)
MCV RBC AUTO: 81.1 FL (ref 79–97)
PHOSPHATE SERPL-MCNC: 1.2 MG/DL (ref 2.5–4.5)
PLATELET # BLD AUTO: 239 10*3/MM3 (ref 140–450)
PMV BLD AUTO: 10.1 FL (ref 6–12)
POTASSIUM SERPL-SCNC: 3.9 MMOL/L (ref 3.5–5.2)
RBC # BLD AUTO: 3.38 10*6/MM3 (ref 3.77–5.28)
SODIUM SERPL-SCNC: 133 MMOL/L (ref 136–145)
WBC NRBC COR # BLD: 9.91 10*3/MM3 (ref 3.4–10.8)

## 2023-03-11 PROCEDURE — 83735 ASSAY OF MAGNESIUM: CPT | Performed by: INTERNAL MEDICINE

## 2023-03-11 PROCEDURE — 80069 RENAL FUNCTION PANEL: CPT | Performed by: INTERNAL MEDICINE

## 2023-03-11 PROCEDURE — 63710000001 INSULIN LISPRO (HUMAN) PER 5 UNITS: Performed by: STUDENT IN AN ORGANIZED HEALTH CARE EDUCATION/TRAINING PROGRAM

## 2023-03-11 PROCEDURE — 82962 GLUCOSE BLOOD TEST: CPT

## 2023-03-11 PROCEDURE — G0378 HOSPITAL OBSERVATION PER HR: HCPCS

## 2023-03-11 PROCEDURE — 85027 COMPLETE CBC AUTOMATED: CPT | Performed by: STUDENT IN AN ORGANIZED HEALTH CARE EDUCATION/TRAINING PROGRAM

## 2023-03-11 RX ORDER — LORAZEPAM 0.5 MG/1
0.5 TABLET ORAL EVERY 6 HOURS PRN
Qty: 12 TABLET | Refills: 0 | Status: SHIPPED | OUTPATIENT
Start: 2023-03-11 | End: 2023-03-14

## 2023-03-11 RX ORDER — ASPIRIN 81 MG/1
81 TABLET ORAL DAILY
Status: DISCONTINUED | OUTPATIENT
Start: 2023-03-11 | End: 2023-03-11 | Stop reason: HOSPADM

## 2023-03-11 RX ORDER — HYDRALAZINE HYDROCHLORIDE 50 MG/1
50 TABLET, FILM COATED ORAL 2 TIMES DAILY
Start: 2023-03-11

## 2023-03-11 RX ORDER — ERGOCALCIFEROL 1.25 MG/1
50000 CAPSULE ORAL
Qty: 5 CAPSULE
Start: 2023-03-17

## 2023-03-11 RX ORDER — HYDROCODONE BITARTRATE AND ACETAMINOPHEN 5; 325 MG/1; MG/1
1 TABLET ORAL EVERY 6 HOURS PRN
Qty: 12 TABLET | Refills: 0 | Status: SHIPPED | OUTPATIENT
Start: 2023-03-11 | End: 2023-03-14

## 2023-03-11 RX ORDER — MUPIROCIN CALCIUM 20 MG/G
1 CREAM TOPICAL EVERY 12 HOURS SCHEDULED
Qty: 14 G | Refills: 0
Start: 2023-03-11 | End: 2023-03-18

## 2023-03-11 RX ORDER — ASPIRIN 81 MG/1
81 TABLET ORAL DAILY
Qty: 30 TABLET | Refills: 0 | Status: SHIPPED | OUTPATIENT
Start: 2023-03-11

## 2023-03-11 RX ADMIN — FOLIC ACID 1 MG: 1 TABLET ORAL at 08:15

## 2023-03-11 RX ADMIN — HYDROCODONE BITARTRATE AND ACETAMINOPHEN 1 TABLET: 5; 325 TABLET ORAL at 00:42

## 2023-03-11 RX ADMIN — MUPIROCIN 1 APPLICATION: 2 CREAM TOPICAL at 08:15

## 2023-03-11 RX ADMIN — LEVOTHYROXINE SODIUM 300 MCG: 0.15 TABLET ORAL at 05:58

## 2023-03-11 RX ADMIN — LEVETIRACETAM 250 MG: 250 TABLET, FILM COATED ORAL at 08:14

## 2023-03-11 RX ADMIN — SODIUM PHOSPHATE, MONOBASIC, MONOHYDRATE AND SODIUM PHOSPHATE, DIBASIC, ANHYDROUS 15 MMOL: 276; 142 INJECTION, SOLUTION INTRAVENOUS at 10:38

## 2023-03-11 RX ADMIN — SERTRALINE 50 MG: 50 TABLET, FILM COATED ORAL at 08:14

## 2023-03-11 RX ADMIN — INSULIN LISPRO 4 UNITS: 100 INJECTION, SOLUTION INTRAVENOUS; SUBCUTANEOUS at 11:59

## 2023-03-11 RX ADMIN — Medication 10 ML: at 08:15

## 2023-03-11 RX ADMIN — PANTOPRAZOLE SODIUM 40 MG: 40 TABLET, DELAYED RELEASE ORAL at 05:58

## 2023-03-11 RX ADMIN — ASPIRIN 81 MG: 81 TABLET, COATED ORAL at 08:15

## 2023-03-11 RX ADMIN — INSULIN LISPRO 2 UNITS: 100 INJECTION, SOLUTION INTRAVENOUS; SUBCUTANEOUS at 08:15

## 2023-03-11 RX ADMIN — CARVEDILOL 3.12 MG: 3.12 TABLET, FILM COATED ORAL at 08:15

## 2023-03-11 RX ADMIN — DESVENLAFAXINE SUCCINATE 25 MG: 25 TABLET, EXTENDED RELEASE ORAL at 08:15

## 2023-03-11 RX ADMIN — HYDRALAZINE HYDROCHLORIDE 50 MG: 50 TABLET, FILM COATED ORAL at 08:15

## 2023-03-11 RX ADMIN — AMLODIPINE BESYLATE 10 MG: 10 TABLET ORAL at 08:15

## 2023-03-11 RX ADMIN — DIBASIC SODIUM PHOSPHATE, MONOBASIC POTASSIUM PHOSPHATE AND MONOBASIC SODIUM PHOSPHATE 2 TABLET: 852; 155; 130 TABLET ORAL at 10:38

## 2023-03-11 NOTE — PLAN OF CARE
Goal Outcome Evaluation:           Progress: no change  Outcome Evaluation: patient c/o leg pain, Norco given x 1 with adequate relief, vss, blood sugar monitored, wound care to right hand complete, saline locked, tolerating diet, blanchable redness to coccyx, mepilex and Z- guard applied to coccyx. Excoriation on inner thighs noted, Z-guard paste applied to inner thighs, brief in place, no BM this shift.

## 2023-03-11 NOTE — DISCHARGE SUMMARY
Patient Name: Zaina Martinez  : 1965  MRN: [id number]    Date of Admission: 3/4/2023  Date of Discharge:  3/11/2023  Primary Care Physician: Norman Serrato MD      Chief Complaint:   Hypoglycemia      Discharge Diagnoses     Active Hospital Problems    Diagnosis  POA   • Hypophosphatemia [E83.39]  Unknown   • Moderate malnutrition (HCC) [E44.0]  Yes   • Ureteral stent present [Z96.0]  Not Applicable   • Pleural effusion [J90]  Yes   • History of stroke- R MCA s/p TPA with subsequent ICH with debility [Z86.73]  Not Applicable   • Hyponatremia [E87.1]  Yes   • ESRD (end stage renal disease) (Formerly [facility]) [N18.6]  Yes   • Abnormal urinalysis [R82.90]  Yes   • Hypothyroidism [E03.9]  Yes   • Rheumatoid arthritis ([facility]) [M06.9]  Yes   • Type 2 diabetes mellitus with kidney complication, with long-term current use of insulin ([facility]ly [facility]) [E11.29, Z79.4]  Not Applicable   • Leukocytosis [D72.829]  Yes   • Anemia [D64.9]  Yes      Resolved Hospital Problems    Diagnosis Date Resolved POA   • **Hypoglycemia due to type 2 diabetes mellitus (Formerly [facility]) [E11.649] 2023 Yes        Hospital Course     Hypoglycemia  Please patient is a 57-year-old female with PMH  including but not limited to   CVA,right femur fracture,paroxysmal atrial fibrillation, seizure disorder, hypothyroidism, chronic diastolic congestive heart failure,CAD, anemia e, nd-stage renal disease on hemodialysis   dialysis schedule  , brittle diabetes and only takes 3 units of Lantus at night and as needed insulin based on her sliding scale presented to the ED from nursing facility for further evaluation management of hypoglycemia when she was found to have blood sugar of 40 prior to admission.  On admission patient's blood glucose was low at 19.  Treated per hypoglycemia protocol.  Per report patient was not symptomatic with severe hypoglycemia.  Hypoglycemia resolved.  Patient's long-acting Lantus was held and discontinued permanently at  discharge due to severe asymptomatic hypoglycemia.  Risk of continue Lantus outweigh the benefits.  Continue sliding scale insulin.  As needed glucagon for hypoglycemia.    Hypophosphatemia: Patient's phosphorus was low despite patient with history of ESRD.  Phosphorus level was 1.2 prior to discharge.  Has received IV sodium Phos and p.o. K-Phos Neutral prior to discharge.  Discharged on K-Phos Neutral tablets,250 mg daily, for 3 days.  Each sodium tablets has minimal amount of potassium, around 1.1 mEq, so it should be safe in the setting ESRD.  Repeat phosphorus level and 2-3 days to monitor      Small erythema/induration right hand dorsal surface: Approximately 1 cm in diameter.  Improved with topical mupirocin.  Continue mupirocin x7 days, wound care.  If enlarging may need to initiate on oral antibiotic    At the time of discharge patient was told to take all medications as prescribed, keep all follow-up appointments, and call their doctor or return to the hospital with any worsening or concerning symptoms.         Day of Discharge     Subjective:  Patient seen this morning, no acute events overnight.  No new complaints.  Blood sugar remains stable, actually on the higher side.  Right dorsal hand induration/erythema improved.  Denies fevers, chills nausea vomiting    Physical Exam:  Temp:  [97 °F (36.1 °C)-98.9 °F (37.2 °C)] 97.1 °F (36.2 °C)  Heart Rate:  [76-89] 85  Resp:  [16] 16  BP: (125-151)/(66-77) 148/77  Body mass index is 23.35 kg/m².  Physical Exam     General: Alert, not in distress, frail, chronically ill-appearing,   HEENT: Normocephalic, atraumatic  CV: RRR, no murmurs  Lungs: CTA, no wheezing  Abdomen: Soft, nontender, nondistended  Extremities: Right hand dorsal surface with small induration (improved)  Consultants     Consult Orders (all) (From admission, onward)     Start     Ordered    03/04/23 1892  Inpatient Nephrology Consult  Once        Specialty:  Nephrology  Provider:  Nazario  Alejo Beth MD    03/04/23 1737    03/04/23 1309  [facility] (on-call MD unless specified) Details  Once        Specialty:  Hospitalist  Provider:  Debbie Quick MD    03/04/23 1308              Procedures     * Surgery not found *      Imaging Results (All)     Procedure Component Value Units Date/Time    XR Chest 1 View [[id number]] Collected: 03/04/23 1149     Updated: 03/04/23 1154    Narrative:      ONE VIEW PORTABLE CHEST AT 11:25 AM     HISTORY: Hypoglycemia. Leukocytosis. Chronic renal failure.     FINDINGS: The lungs are well-expanded with persistent increased density  in the lower left chest that obscures the diaphragm and consistent with  moderately large left pleural effusion and adjacent dense atelectasis as  also noted on the chest CT scan dated 02/08/2023. This is also similar  to the chest x-ray dated 02/05/2023. The right lung remains clear. The  heart remains enlarged. A large gauge right-sided vascular catheter ends  in the right atrium without change.     This report was finalized on 3/4/2023 11:51 AM by Dr. Ronnie Ritchie M.D.           Results for orders placed during the hospital encounter of 02/01/23    Duplex Venous Lower Extremity - Bilateral CAR    Interpretation Summary  •  Normal bilateral lower extremity venous duplex scan.  •  Enlarged inguinal lymph nodes bilaterally.  Maximum size on the right 3.3 cm.  Left side measures 2.9 cm    Results for orders placed during the hospital encounter of 12/26/22    Adult Transthoracic Echo Limited W/ Cont if Necessary Per Protocol    Interpretation Summary  •  Echocardiogram Findings Left Ventricle: Left ventricular systolic function is normal. Calculated left ventricular EF = 52% Normal left ventricular cavity size noted. Left ventricular wall thickness is consistent with mild concentric hypertrophy. There are wall motion abnormalities as noted below Left ventricular diastolic function is consistent with (grade II w/high LAP) pseudonormalization.  •   The following left ventricular wall segments are hypokinetic: mid anterior, basal anterolateral, mid anterolateral and basal anterior.  •  Left atrial volume is moderately increased. The right atrial cavity is moderately dilated.  •  Mild mitral annular calcification is present. Mild to moderate mitral valve regurgitation is present. No significant mitral valve stenosis is present.  •  Trace tricuspid valve regurgitation is present. Estimated right ventricular systolic pressure from tricuspid regurgitation is normal (<35 mmHg). Calculated right ventricular systolic pressure from tricuspid regurgitation is 26.4 mmHg.  •  There is a moderate sized left pleural effusion    Pertinent Labs     Results from last 7 days   Lab Units 03/11/23  0653 03/10/23  0534 03/09/23 0557 03/08/23  0612   WBC 10*3/mm3 9.91 10.39 14.13* 12.15*   HEMOGLOBIN g/dL 8.5* 7.7* 8.4* 7.0*   PLATELETS 10*3/mm3 239 199 247 252     Results from last 7 days   Lab Units 03/11/23 0653 03/10/23  0534 03/09/23 0557 03/08/23  0612   SODIUM mmol/L 133* 130* 133* 132*   POTASSIUM mmol/L 3.9 4.1 5.5* 4.5   CHLORIDE mmol/L 95* 97* 99 99   CO2 mmol/L 26.4 22.9 19.6* 25.5   BUN mg/dL 24* 27* 45* 29*   CREATININE mg/dL 1.63* 2.05* 3.36* 2.87*   GLUCOSE mg/dL 162* 223* 381* 132*   Estimated Creatinine Clearance: 34.8 mL/min (A) (by C-G formula based on SCr of 1.63 mg/dL (H)).  Results from last 7 days   Lab Units 03/11/23 0653 03/10/23  0534 03/09/23  0557 03/08/23 0612 03/05/23 0527 03/04/23  1047   ALBUMIN g/dL 2.3* 2.0* 2.3* 2.2*   < > 2.7*   BILIRUBIN mg/dL  --   --   --   --   --  0.5   ALK PHOS U/L  --   --   --   --   --  161*   AST (SGOT) U/L  --   --   --   --   --  25   ALT (SGPT) U/L  --   --   --   --   --  5    < > = values in this interval not displayed.     Results from last 7 days   Lab Units 03/11/23  0653 03/10/23  0534 03/09/23  0557 03/08/23  0612 03/06/23  0451 03/05/23  0527   CALCIUM mg/dL 8.2* 7.9* 8.3* 8.1* 7.9* 7.9*   ALBUMIN g/dL  2.3* 2.0* 2.3* 2.2* 2.2* 2.3*   MAGNESIUM mg/dL 1.8  --   --   --  1.6 1.8   PHOSPHORUS mg/dL 1.2* 1.0* 1.0* 0.7* 6.2* 2.0*               Invalid input(s): LDLCALC  Results from last 7 days   Lab Units 03/05/23  0827 03/05/23  0820   BLOODCX  No growth at 5 days No growth at 5 days         Test Results Pending at Discharge       Discharge Details        Discharge Medications      New Medications      Instructions Start Date   mupirocin 2 % cream  Commonly known as: BACTROBAN   1 application, Topical, Every 12 Hours Scheduled      phosphorus 155-852-130 MG tablet  Commonly known as: K PHOS NEUTRAL   1 tablet, Oral, Daily      vitamin D 1.25 MG (46156 UT) capsule capsule  Commonly known as: ERGOCALCIFEROL   50,000 Units, Oral, Every 7 Days   Start Date: March 17, 2023        Changes to Medications      Instructions Start Date   hydrALAZINE 50 MG tablet  Commonly known as: APRESOLINE  What changed: how to take this   50 mg, Oral, 2 Times Daily      HYDROcodone-acetaminophen 5-325 MG per tablet  Commonly known as: NORCO  What changed: reasons to take this   1 tablet, Oral, Every 6 Hours PRN      levothyroxine 150 MCG tablet  Commonly known as: Synthroid  What changed: Another medication with the same name was removed. Continue taking this medication, and follow the directions you see here.   300 mcg, Oral, Daily      LORazepam 0.5 MG tablet  Commonly known as: ATIVAN  What changed:   · when to take this  · reasons to take this  · Another medication with the same name was removed. Continue taking this medication, and follow the directions you see here.   0.5 mg, Oral, Every 6 Hours PRN         Continue These Medications      Instructions Start Date   acetaminophen 325 MG tablet  Commonly known as: TYLENOL   650 mg, Oral, Every 6 Hours PRN      amLODIPine 10 MG tablet  Commonly known as: NORVASC   10 mg, Oral, Every 24 Hours Scheduled      aspirin 81 MG EC tablet   81 mg, Oral, Daily      carvedilol 3.125 MG  tablet  Commonly known as: COREG   3.125 mg, Oral, 2 Times Daily With Meals      Desvenlafaxine Succinate ER 25 MG tablet sustained-release 24 hour   25 mg, Oral, Daily      folic acid 1 MG tablet  Commonly known as: FOLVITE   1 mg, Oral, Daily      glucagon 1 MG injection  Commonly known as: GLUCAGEN   1 mg, Intramuscular      insulin lispro 100 UNIT/ML injection  Commonly known as: ADMELOG   0-7 Units, Subcutaneous, 3 Times Daily Before Meals      lactulose 10 GM/15ML solution  Commonly known as: CHRONULAC   15 mL, Oral, 2 Times Daily PRN      levETIRAcetam 250 MG tablet  Commonly known as: Keppra   250 mg, Oral, 2 Times Daily      lidocaine 5 %  Commonly known as: LIDODERM   1 patch, Transdermal, Every 24 Hours Scheduled, Remove & Discard patch within 12 hours or as directed by MD      lisinopril 20 MG tablet  Commonly known as: PRINIVIL,ZESTRIL   20 mg, Oral, Nightly      melatonin 3 MG tablet   3 mg, Oral, Nightly PRN      MIRCERA IJ   100 mcg, Every 14 Days      ondansetron ODT 4 MG disintegrating tablet  Commonly known as: ZOFRAN-ODT   4 mg, Translingual, Every 8 Hours PRN      pantoprazole 40 MG EC tablet  Commonly known as: PROTONIX   40 mg, Oral, Daily      rOPINIRole 2 MG tablet  Commonly known as: REQUIP   2 mg, Oral, Nightly      sennosides-docusate 8.6-50 MG per tablet  Commonly known as: PERICOLACE   2 tablets, Oral, Nightly PRN         Stop These Medications    insulin glargine 100 UNIT/ML injection  Commonly known as: LANT SEMGLEE     sertraline 50 MG tablet  Commonly known as: ZOLOFT            Allergies   Allergen Reactions   • Contrast Dye (Echo Or Unknown Ct/Mr) Confusion   • Iodinated Contrast Media Unknown - High Severity   • Ibuprofen Other (See Comments)     Kidney disease   • Prochlorperazine Other (See Comments)     Dystonic reaction            Discharge Disposition:  Rehab Facility or Unit (DC - External)      Discharge Diet:  Diet Order   Procedures   • Diet: Cardiac Diets, Diabetic  Diets, Renal Diets; Healthy Heart (2-3 Na+); Consistent Carbohydrate; Low Sodium (2-3g), Low Potassium; Texture: Regular Texture (IDDSI 7); Fluid Consistency: Thin (IDDSI 0)       Discharge Activity:       CODE STATUS:    Code Status and Medical Interventions:   Ordered at: 03/04/23 2939     Code Status (Patient has no pulse and is not breathing):    CPR (Attempt to Resuscitate)     Medical Interventions (Patient has pulse or is breathing):    Full Support       Future Appointments   Date Time Provider Department Center   4/6/2023  8:30 AM Rinku Shook, DO [facility]      Contact information for follow-up providers     Norman Serrato MD .    Specialty: Internal Medicine  Contact information:  [address] [facility] [address] [phone]                   Contact information for after-discharge care     Destination     [facility] .    Service: Skilled Nursing  Contact information:  [address] [facility] [address] [phone]                             Time Spent on Discharge:  Greater than 30 minutes      Albert Templeton MD  [facility]  03/11/23  08:51 EST

## 2023-03-11 NOTE — PROGRESS NOTES
[facility] Progress Note      Patient Name: Zaina Martinez  : 1965  MRN: [id number]  Primary Care Physician:  Norman Serrato MD  Date of admission: 3/4/2023    Subjective     Interval History:   Follow up ESRD  Had HD yesterday without incident  Appetite better; no N/V; less cramping with dry weight increase    Review of Systems:   As noted above    Objective     Vitals:   Temp:  [97 °F (36.1 °C)-98.8 °F (37.1 °C)] 98.5 °F (36.9 °C)  Heart Rate:  [76-89] 89  Resp:  [16] 16  BP: (125-151)/(66-77) 147/75    Intake/Output Summary (Last 24 hours) at 3/11/2023 1035  Last data filed at 3/10/2023 2300  Gross per 24 hour   Intake 710 ml   Output --   Net 710 ml       Physical Exam:    General Appearance: chronically ill appearing, pleasant  Skin: warm and dry  HEENT: oral mucosa moist.   Neck: RIJ TDC. Tunnel non-tender no discharge  Lungs: decreased bs both bases; not labored on room air  Heart: RRR, no s3 or rub  Abdomen: soft, nontender, nondistended  Extremities: no edema.  Neuro: moves all 4 extremities    Scheduled Meds:     amLODIPine, 10 mg, Oral, Q24H  aspirin, 81 mg, Oral, Daily  carvedilol, 3.125 mg, Oral, BID With Meals  Desvenlafaxine Succinate ER, 25 mg, Oral, Daily  epoetin brooke/brooke-epbx, 10,000 Units, Intravenous, Once per day on   folic acid, 1 mg, Oral, Daily  hydrALAZINE, 50 mg, Oral, TID  insulin lispro, 0-7 Units, Subcutaneous, TID AC  levETIRAcetam, 250 mg, Oral, BID  levothyroxine, 300 mcg, Oral, Daily  lisinopril, 20 mg, Oral, Nightly  mupirocin, 1 application, Topical, Q12H  pantoprazole, 40 mg, Oral, Daily  phosphorus, 500 mg, Oral, Once  rOPINIRole, 2 mg, Oral, Nightly  sertraline, 50 mg, Oral, Daily  sodium chloride, 10 mL, Intravenous, Q12H  sodium phosphate IVPB, 15 mmol, Intravenous, Once  vitamin D, 50,000 Units, Oral, Q7 Days      IV Meds:        Results Reviewed:   I have personally reviewed the results from the time of this admission to  3/11/2023 10:35 EST     Results from last 7 days   Lab Units 03/11/23  0653 03/10/23  0534 03/09/23  0557 03/05/23  0527 03/04/23  1047   SODIUM mmol/L 133* 130* 133*   < > 129*   POTASSIUM mmol/L 3.9 4.1 5.5*   < > 4.2   CHLORIDE mmol/L 95* 97* 99   < > 91*   CO2 mmol/L 26.4 22.9 19.6*   < > 30.2*   BUN mg/dL 24* 27* 45*   < > 19   CREATININE mg/dL 1.63* 2.05* 3.36*   < > 2.38*   CALCIUM mg/dL 8.2* 7.9* 8.3*   < > 8.2*   BILIRUBIN mg/dL  --   --   --   --  0.5   ALK PHOS U/L  --   --   --   --  161*   ALT (SGPT) U/L  --   --   --   --  5   AST (SGOT) U/L  --   --   --   --  25   GLUCOSE mg/dL 162* 223* 381*   < > 143*    < > = values in this interval not displayed.       Estimated Creatinine Clearance: 34.8 mL/min (A) (by C-G formula based on SCr of 1.63 mg/dL (H)).    Results from last 7 days   Lab Units 03/11/23 0653 03/10/23  0534 03/09/23  0557 03/08/23  0612 03/06/23  0451 03/05/23  0527   MAGNESIUM mg/dL 1.8  --   --   --  1.6 1.8   PHOSPHORUS mg/dL 1.2* 1.0* 1.0*   < > 6.2* 2.0*    < > = values in this interval not displayed.             Results from last 7 days   Lab Units 03/11/23  0653 03/10/23  0534 03/09/23  0557 03/08/23  0612 03/07/23  0524   WBC 10*3/mm3 9.91 10.39 14.13* 12.15* 10.49   HEMOGLOBIN g/dL 8.5* 7.7* 8.4* 7.0* 6.4*   PLATELETS 10*3/mm3 239 199 247 252 215             Assessment / Plan     ASSESSMENT:  1. ESRD, with stable volume status; severe hypophosphatemia due to very poor nutrition; not on any binders  2. Failure to thrive with weakness and malnutrition:  multiple hospitalizations  3. Anemia of CKD; one unit PRBC 3/7 and 3/8.   Epo with HD  4. DM2 very labile  5. HTN controlled .  6. Hypothyroid with previously very elevated TSH.  Much improved   7. Chronic systolic heart failure - nicely compensated at present      PLAN:  1.  No change to HD MWF schedule while here, was on home hd prior to admission  2.  Sodium phosphate given yesterday and today  3.  Removed phosphorus restriction  from diet  4.  Surveillance labs    Moises Lucero MD  03/11/23  10:35 EST    [facility]  [phone]

## 2023-03-11 NOTE — PROGRESS NOTES
[facility] Progress Note      Patient Name: Zaina Martinez  : 1965  MRN: [id number]  Primary Care Physician:  Norman Serrato MD  Date of admission: 3/4/2023    Subjective     Interval History:   Follow up ESRD  Had HD earlier today without difficulty; 1.3 L removed  Appetite better; no N/V    Review of Systems:   As noted above    Objective     Vitals:   Temp:  [97 °F (36.1 °C)-99.9 °F (37.7 °C)] 97 °F (36.1 °C)  Heart Rate:  [76-93] 87  Resp:  [16] 16  BP: (125-171)/(66-82) 125/66    Intake/Output Summary (Last 24 hours) at 3/10/2023 1958  Last data filed at 3/10/2023 1631  Gross per 24 hour   Intake 250 ml   Output --   Net 250 ml       Physical Exam:    General Appearance: chronically ill, but looks better; lucid, NAD  Skin: warm and dry, pale  HEENT: oral mucosa moist.   Neck: RIJ TDC. Tunnel non-tender.   Lungs: decreased bs both bases; not labored on room air  Heart: RRR, no s3 or rub  Abdomen: soft, nontender, nondistended, + BS  Extremities: no edema.  Neuro: Brighter affect than before; moves all 4 ext    Scheduled Meds:     amLODIPine, 10 mg, Oral, Q24H  aspirin, 81 mg, Oral, Daily  carvedilol, 3.125 mg, Oral, BID With Meals  Desvenlafaxine Succinate ER, 25 mg, Oral, Daily  epoetin brooke/brooke-epbx, 10,000 Units, Intravenous, Once per day on   folic acid, 1 mg, Oral, Daily  hydrALAZINE, 50 mg, Oral, TID  insulin lispro, 0-7 Units, Subcutaneous, TID AC  levETIRAcetam, 250 mg, Oral, BID  levothyroxine, 300 mcg, Oral, Daily  lisinopril, 20 mg, Oral, Nightly  mupirocin, 1 application, Topical, Q12H  OLANZapine, 5 mg, Oral, BID  pantoprazole, 40 mg, Oral, Daily  rOPINIRole, 2 mg, Oral, Nightly  sertraline, 50 mg, Oral, Daily  sodium chloride, 10 mL, Intravenous, Q12H  vitamin D, 50,000 Units, Oral, Q7 Days      IV Meds:        Results Reviewed:   I have personally reviewed the results from the time of this admission to 3/10/2023 19:58 EST     Results from last 7  days   Lab Units 03/10/23  0534 03/09/23  0557 03/08/23  0612 03/05/23  0527 03/04/23  1047   SODIUM mmol/L 130* 133* 132*   < > 129*   POTASSIUM mmol/L 4.1 5.5* 4.5   < > 4.2   CHLORIDE mmol/L 97* 99 99   < > 91*   CO2 mmol/L 22.9 19.6* 25.5   < > 30.2*   BUN mg/dL 27* 45* 29*   < > 19   CREATININE mg/dL 2.05* 3.36* 2.87*   < > 2.38*   CALCIUM mg/dL 7.9* 8.3* 8.1*   < > 8.2*   BILIRUBIN mg/dL  --   --   --   --  0.5   ALK PHOS U/L  --   --   --   --  161*   ALT (SGPT) U/L  --   --   --   --  5   AST (SGOT) U/L  --   --   --   --  25   GLUCOSE mg/dL 223* 381* 132*   < > 143*    < > = values in this interval not displayed.       Estimated Creatinine Clearance: 27.7 mL/min (A) (by C-G formula based on SCr of 2.05 mg/dL (H)).    Results from last 7 days   Lab Units 03/10/23  0534 03/09/23  0557 03/08/23 0612 03/06/23 0451 03/05/23  0527   MAGNESIUM mg/dL  --   --   --  1.6 1.8   PHOSPHORUS mg/dL 1.0* 1.0* 0.7* 6.2* 2.0*             Results from last 7 days   Lab Units 03/10/23  0534 03/09/23  0557 03/08/23  0612 03/07/23  0524 03/06/23 0451   WBC 10*3/mm3 10.39 14.13* 12.15* 10.49 13.93*   HEMOGLOBIN g/dL 7.7* 8.4* 7.0* 6.4* 7.0*   PLATELETS 10*3/mm3 199 247 252 215 238             Assessment / Plan     ASSESSMENT:  1. ESRD, with stable volume status; severe hypophosphatemia due to very poor nutrition; not on any binders  2. Failure to thrive with weakness and malnutrition:  multiple hospitalizations  3. Anemia of CKD; one unit PRBC 3/7 and 3/8.   Epo with HD  4. DM2 very labile  5. HTN controlled .  6. Hypothyroid with previously very elevated TSH.  Much improved   7. Chronic systolic heart failure .  8. Left renal hematoma after left ureteral stent 12/30. Left renal artery embolization 1/1/23. Left stent removal 1/10/23.     PLAN:  1.  HD MWF  2.  Sodium phosphate given earlier  3.  Remove phosphorus restriction from diet  4.  Surveillance labs    Alejo Lange MD  03/10/23  19:58 EST    Nephrology  [facility]  [phone]

## 2023-03-13 NOTE — PROGRESS NOTES
Case Management Discharge Note      Final Note: Discharged [facility] [place] With HD MWF Liset Beckman, [facility]         Selected Continued Care - Discharged on 3/11/2023 Admission date: 3/4/2023 - Discharge disposition: Rehab Facility or Unit (DC - External)    Destination Coordination complete.    Service Provider Selected Services Address Phone Fax Patient Preferred    [facility] [address] [facility] [address] [phone] --                 Transportation Services  W/C [facility]    Final Discharge Disposition Code: 03 - skilled nursing facility (SNF)

## 2023-03-20 ENCOUNTER — APPOINTMENT (OUTPATIENT)
Dept: GENERAL RADIOLOGY | Facility: HOSPITAL | Age: 58
DRG: 871 | End: 2023-03-20
Payer: MEDICARE

## 2023-03-20 ENCOUNTER — HOSPITAL ENCOUNTER (INPATIENT)
Facility: HOSPITAL | Age: 58
LOS: 8 days | Discharge: HOME | DRG: 871 | End: 2023-03-28
Attending: EMERGENCY MEDICINE | Admitting: HOSPITALIST
Payer: MEDICARE

## 2023-03-20 ENCOUNTER — APPOINTMENT (OUTPATIENT)
Dept: CT IMAGING | Facility: HOSPITAL | Age: 58
DRG: 871 | End: 2023-03-20
Payer: MEDICARE

## 2023-03-20 DIAGNOSIS — N18.6 ANEMIA DUE TO CHRONIC KIDNEY DISEASE, ON CHRONIC DIALYSIS: ICD-10-CM

## 2023-03-20 DIAGNOSIS — R41.82 ALTERED MENTAL STATUS, UNSPECIFIED ALTERED MENTAL STATUS TYPE: ICD-10-CM

## 2023-03-20 DIAGNOSIS — J90 PLEURAL EFFUSION: ICD-10-CM

## 2023-03-20 DIAGNOSIS — D63.1 ANEMIA DUE TO CHRONIC KIDNEY DISEASE, ON CHRONIC DIALYSIS: ICD-10-CM

## 2023-03-20 DIAGNOSIS — Z99.2 ANEMIA DUE TO CHRONIC KIDNEY DISEASE, ON CHRONIC DIALYSIS: ICD-10-CM

## 2023-03-20 DIAGNOSIS — R09.02 HYPOXIA: ICD-10-CM

## 2023-03-20 DIAGNOSIS — J18.9 PNEUMONIA OF LEFT LOWER LOBE DUE TO INFECTIOUS ORGANISM: ICD-10-CM

## 2023-03-20 DIAGNOSIS — N39.0 URINARY TRACT INFECTION WITHOUT HEMATURIA, SITE UNSPECIFIED: Primary | ICD-10-CM

## 2023-03-20 DIAGNOSIS — N18.6 END STAGE RENAL DISEASE: ICD-10-CM

## 2023-03-20 LAB
ABO GROUP BLD: NORMAL
ALBUMIN SERPL-MCNC: 2.4 G/DL (ref 3.5–5.2)
ALBUMIN/GLOB SERPL: 0.7 G/DL
ALP SERPL-CCNC: 137 U/L (ref 39–117)
ALT SERPL W P-5'-P-CCNC: <5 U/L (ref 1–33)
ANION GAP SERPL CALCULATED.3IONS-SCNC: 12.3 MMOL/L (ref 5–15)
ARTERIAL PATENCY WRIST A: POSITIVE
AST SERPL-CCNC: 13 U/L (ref 1–32)
ATMOSPHERIC PRESS: 756.9 MMHG
B PARAPERT DNA SPEC QL NAA+PROBE: NOT DETECTED
B PERT DNA SPEC QL NAA+PROBE: NOT DETECTED
BACTERIA UR QL AUTO: ABNORMAL /HPF
BASE EXCESS BLDA CALC-SCNC: 5.6 MMOL/L (ref 0–2)
BASOPHILS # BLD AUTO: 0.05 10*3/MM3 (ref 0–0.2)
BASOPHILS NFR BLD AUTO: 0.4 % (ref 0–1.5)
BDY SITE: ABNORMAL
BILIRUB SERPL-MCNC: 0.3 MG/DL (ref 0–1.2)
BILIRUB UR QL STRIP: NEGATIVE
BLD GP AB SCN SERPL QL: NEGATIVE
BUN SERPL-MCNC: 33 MG/DL (ref 6–20)
BUN/CREAT SERPL: 10.6 (ref 7–25)
C PNEUM DNA NPH QL NAA+NON-PROBE: NOT DETECTED
CALCIUM SPEC-SCNC: 8.2 MG/DL (ref 8.6–10.5)
CHLORIDE SERPL-SCNC: 95 MMOL/L (ref 98–107)
CLARITY UR: ABNORMAL
CO2 SERPL-SCNC: 25.7 MMOL/L (ref 22–29)
COLOR UR: YELLOW
CREAT SERPL-MCNC: 3.11 MG/DL (ref 0.57–1)
D-LACTATE SERPL-SCNC: 0.6 MMOL/L (ref 0.5–2)
DEPRECATED RDW RBC AUTO: 53.8 FL (ref 37–54)
EGFRCR SERPLBLD CKD-EPI 2021: 16.9 ML/MIN/1.73
EOSINOPHIL # BLD AUTO: 0.04 10*3/MM3 (ref 0–0.4)
EOSINOPHIL NFR BLD AUTO: 0.3 % (ref 0.3–6.2)
ERYTHROCYTE [DISTWIDTH] IN BLOOD BY AUTOMATED COUNT: 17.5 % (ref 12.3–15.4)
FLUAV SUBTYP SPEC NAA+PROBE: NOT DETECTED
FLUBV RNA ISLT QL NAA+PROBE: NOT DETECTED
GAS FLOW AIRWAY: 4 LPM
GLOBULIN UR ELPH-MCNC: 3.3 GM/DL
GLUCOSE BLDC GLUCOMTR-MCNC: 178 MG/DL (ref 70–130)
GLUCOSE BLDC GLUCOMTR-MCNC: 194 MG/DL (ref 70–130)
GLUCOSE SERPL-MCNC: 214 MG/DL (ref 65–99)
GLUCOSE UR STRIP-MCNC: NEGATIVE MG/DL
HADV DNA SPEC NAA+PROBE: NOT DETECTED
HCO3 BLDA-SCNC: 30.6 MMOL/L (ref 22–28)
HCOV 229E RNA SPEC QL NAA+PROBE: NOT DETECTED
HCOV HKU1 RNA SPEC QL NAA+PROBE: NOT DETECTED
HCOV NL63 RNA SPEC QL NAA+PROBE: NOT DETECTED
HCOV OC43 RNA SPEC QL NAA+PROBE: NOT DETECTED
HCT VFR BLD AUTO: 22.1 % (ref 34–46.6)
HGB BLD-MCNC: 7 G/DL (ref 12–15.9)
HGB UR QL STRIP.AUTO: ABNORMAL
HMPV RNA NPH QL NAA+NON-PROBE: NOT DETECTED
HPIV1 RNA ISLT QL NAA+PROBE: NOT DETECTED
HPIV2 RNA SPEC QL NAA+PROBE: NOT DETECTED
HPIV3 RNA NPH QL NAA+PROBE: NOT DETECTED
HPIV4 P GENE NPH QL NAA+PROBE: NOT DETECTED
HYALINE CASTS UR QL AUTO: ABNORMAL /LPF
IMM GRANULOCYTES # BLD AUTO: 0.08 10*3/MM3 (ref 0–0.05)
IMM GRANULOCYTES NFR BLD AUTO: 0.6 % (ref 0–0.5)
KETONES UR QL STRIP: ABNORMAL
LEUKOCYTE ESTERASE UR QL STRIP.AUTO: ABNORMAL
LYMPHOCYTES # BLD AUTO: 1.12 10*3/MM3 (ref 0.7–3.1)
LYMPHOCYTES NFR BLD AUTO: 8.3 % (ref 19.6–45.3)
M PNEUMO IGG SER IA-ACNC: NOT DETECTED
MCH RBC QN AUTO: 26.8 PG (ref 26.6–33)
MCHC RBC AUTO-ENTMCNC: 31.7 G/DL (ref 31.5–35.7)
MCV RBC AUTO: 84.7 FL (ref 79–97)
MODALITY: ABNORMAL
MONOCYTES # BLD AUTO: 0.79 10*3/MM3 (ref 0.1–0.9)
MONOCYTES NFR BLD AUTO: 5.8 % (ref 5–12)
NEUTROPHILS NFR BLD AUTO: 11.49 10*3/MM3 (ref 1.7–7)
NEUTROPHILS NFR BLD AUTO: 84.6 % (ref 42.7–76)
NITRITE UR QL STRIP: NEGATIVE
NRBC BLD AUTO-RTO: 0 /100 WBC (ref 0–0.2)
NT-PROBNP SERPL-MCNC: ABNORMAL PG/ML (ref 0–900)
PCO2 BLDA: 46.6 MM HG (ref 35–45)
PH BLDA: 7.42 PH UNITS (ref 7.35–7.45)
PH UR STRIP.AUTO: 6 [PH] (ref 5–8)
PLATELET # BLD AUTO: 353 10*3/MM3 (ref 140–450)
PMV BLD AUTO: 9.4 FL (ref 6–12)
PO2 BLDA: 67.4 MM HG (ref 80–100)
POTASSIUM SERPL-SCNC: 4.2 MMOL/L (ref 3.5–5.2)
PROCALCITONIN SERPL-MCNC: 0.85 NG/ML (ref 0–0.25)
PROT SERPL-MCNC: 5.7 G/DL (ref 6–8.5)
PROT UR QL STRIP: ABNORMAL
QT INTERVAL: 415 MS
RBC # BLD AUTO: 2.61 10*6/MM3 (ref 3.77–5.28)
RBC # UR STRIP: ABNORMAL /HPF
REF LAB TEST METHOD: ABNORMAL
RH BLD: POSITIVE
RHINOVIRUS RNA SPEC NAA+PROBE: NOT DETECTED
RSV RNA NPH QL NAA+NON-PROBE: NOT DETECTED
SAO2 % BLDCOA: 93.3 % (ref 92–99)
SARS-COV-2 RNA NPH QL NAA+NON-PROBE: NOT DETECTED
SODIUM SERPL-SCNC: 133 MMOL/L (ref 136–145)
SP GR UR STRIP: 1.02 (ref 1–1.03)
SQUAMOUS #/AREA URNS HPF: ABNORMAL /HPF
T&S EXPIRATION DATE: NORMAL
TOTAL RATE: 18 BREATHS/MINUTE
UROBILINOGEN UR QL STRIP: ABNORMAL
WBC # UR STRIP: ABNORMAL /HPF
WBC NRBC COR # BLD: 13.57 10*3/MM3 (ref 3.4–10.8)

## 2023-03-20 PROCEDURE — 0202U NFCT DS 22 TRGT SARS-COV-2: CPT | Performed by: EMERGENCY MEDICINE

## 2023-03-20 PROCEDURE — 86900 BLOOD TYPING SEROLOGIC ABO: CPT | Performed by: EMERGENCY MEDICINE

## 2023-03-20 PROCEDURE — 86901 BLOOD TYPING SEROLOGIC RH(D): CPT | Performed by: EMERGENCY MEDICINE

## 2023-03-20 PROCEDURE — 25010000002 AZITHROMYCIN PER 500 MG: Performed by: EMERGENCY MEDICINE

## 2023-03-20 PROCEDURE — 83605 ASSAY OF LACTIC ACID: CPT | Performed by: EMERGENCY MEDICINE

## 2023-03-20 PROCEDURE — 84145 PROCALCITONIN (PCT): CPT | Performed by: EMERGENCY MEDICINE

## 2023-03-20 PROCEDURE — 63710000001 INSULIN LISPRO (HUMAN) PER 5 UNITS: Performed by: HOSPITALIST

## 2023-03-20 PROCEDURE — 82803 BLOOD GASES ANY COMBINATION: CPT

## 2023-03-20 PROCEDURE — 71250 CT THORAX DX C-: CPT

## 2023-03-20 PROCEDURE — 36430 TRANSFUSION BLD/BLD COMPNT: CPT

## 2023-03-20 PROCEDURE — 25010000002 AZITHROMYCIN PER 500 MG: Performed by: HOSPITALIST

## 2023-03-20 PROCEDURE — 82962 GLUCOSE BLOOD TEST: CPT

## 2023-03-20 PROCEDURE — 85025 COMPLETE CBC W/AUTO DIFF WBC: CPT | Performed by: EMERGENCY MEDICINE

## 2023-03-20 PROCEDURE — 80053 COMPREHEN METABOLIC PANEL: CPT | Performed by: EMERGENCY MEDICINE

## 2023-03-20 PROCEDURE — 36415 COLL VENOUS BLD VENIPUNCTURE: CPT

## 2023-03-20 PROCEDURE — 71045 X-RAY EXAM CHEST 1 VIEW: CPT

## 2023-03-20 PROCEDURE — 83880 ASSAY OF NATRIURETIC PEPTIDE: CPT | Performed by: HOSPITALIST

## 2023-03-20 PROCEDURE — 70450 CT HEAD/BRAIN W/O DYE: CPT

## 2023-03-20 PROCEDURE — 25010000002 CEFTRIAXONE PER 250 MG: Performed by: HOSPITALIST

## 2023-03-20 PROCEDURE — 99285 EMERGENCY DEPT VISIT HI MDM: CPT

## 2023-03-20 PROCEDURE — 87040 BLOOD CULTURE FOR BACTERIA: CPT | Performed by: EMERGENCY MEDICINE

## 2023-03-20 PROCEDURE — 93005 ELECTROCARDIOGRAM TRACING: CPT | Performed by: EMERGENCY MEDICINE

## 2023-03-20 PROCEDURE — 25010000002 CEFTRIAXONE PER 250 MG: Performed by: EMERGENCY MEDICINE

## 2023-03-20 PROCEDURE — 87086 URINE CULTURE/COLONY COUNT: CPT | Performed by: EMERGENCY MEDICINE

## 2023-03-20 PROCEDURE — 36600 WITHDRAWAL OF ARTERIAL BLOOD: CPT

## 2023-03-20 PROCEDURE — 86923 COMPATIBILITY TEST ELECTRIC: CPT

## 2023-03-20 PROCEDURE — 86850 RBC ANTIBODY SCREEN: CPT | Performed by: EMERGENCY MEDICINE

## 2023-03-20 PROCEDURE — 93010 ELECTROCARDIOGRAM REPORT: CPT | Performed by: INTERNAL MEDICINE

## 2023-03-20 PROCEDURE — 86900 BLOOD TYPING SEROLOGIC ABO: CPT

## 2023-03-20 PROCEDURE — P9016 RBC LEUKOCYTES REDUCED: HCPCS

## 2023-03-20 PROCEDURE — 81001 URINALYSIS AUTO W/SCOPE: CPT | Performed by: EMERGENCY MEDICINE

## 2023-03-20 RX ORDER — INSULIN LISPRO 100 [IU]/ML
0-7 INJECTION, SOLUTION INTRAVENOUS; SUBCUTANEOUS
Status: DISCONTINUED | OUTPATIENT
Start: 2023-03-20 | End: 2023-03-28 | Stop reason: HOSPADM

## 2023-03-20 RX ORDER — AMOXICILLIN 250 MG
2 CAPSULE ORAL NIGHTLY PRN
Status: DISCONTINUED | OUTPATIENT
Start: 2023-03-20 | End: 2023-03-28

## 2023-03-20 RX ORDER — AMLODIPINE BESYLATE 10 MG/1
10 TABLET ORAL
Status: DISCONTINUED | OUTPATIENT
Start: 2023-03-20 | End: 2023-03-28 | Stop reason: HOSPADM

## 2023-03-20 RX ORDER — LEVETIRACETAM 250 MG/1
250 TABLET ORAL 2 TIMES DAILY
Status: DISCONTINUED | OUTPATIENT
Start: 2023-03-20 | End: 2023-03-28 | Stop reason: HOSPADM

## 2023-03-20 RX ORDER — PANTOPRAZOLE SODIUM 40 MG/1
40 TABLET, DELAYED RELEASE ORAL DAILY
Status: DISCONTINUED | OUTPATIENT
Start: 2023-03-20 | End: 2023-03-28 | Stop reason: HOSPADM

## 2023-03-20 RX ORDER — ROPINIROLE 2 MG/1
2 TABLET, FILM COATED ORAL NIGHTLY
Status: DISCONTINUED | OUTPATIENT
Start: 2023-03-20 | End: 2023-03-28 | Stop reason: HOSPADM

## 2023-03-20 RX ORDER — LEVOTHYROXINE SODIUM 0.15 MG/1
300 TABLET ORAL DAILY
Status: DISCONTINUED | OUTPATIENT
Start: 2023-03-20 | End: 2023-03-28 | Stop reason: HOSPADM

## 2023-03-20 RX ORDER — INSULIN LISPRO 100 [IU]/ML
0-7 INJECTION, SOLUTION INTRAVENOUS; SUBCUTANEOUS
Status: DISCONTINUED | OUTPATIENT
Start: 2023-03-20 | End: 2023-03-20

## 2023-03-20 RX ORDER — ACETAMINOPHEN 325 MG/1
650 TABLET ORAL EVERY 6 HOURS PRN
Status: DISCONTINUED | OUTPATIENT
Start: 2023-03-20 | End: 2023-03-28

## 2023-03-20 RX ORDER — HYDRALAZINE HYDROCHLORIDE 50 MG/1
50 TABLET, FILM COATED ORAL 2 TIMES DAILY
Status: DISCONTINUED | OUTPATIENT
Start: 2023-03-20 | End: 2023-03-20

## 2023-03-20 RX ORDER — SODIUM CHLORIDE 0.9 % (FLUSH) 0.9 %
10 SYRINGE (ML) INJECTION AS NEEDED
Status: DISCONTINUED | OUTPATIENT
Start: 2023-03-20 | End: 2023-03-28 | Stop reason: HOSPADM

## 2023-03-20 RX ORDER — UREA 10 %
3 LOTION (ML) TOPICAL NIGHTLY PRN
Status: DISCONTINUED | OUTPATIENT
Start: 2023-03-20 | End: 2023-03-28

## 2023-03-20 RX ORDER — FOLIC ACID 1 MG/1
1 TABLET ORAL DAILY
Status: DISCONTINUED | OUTPATIENT
Start: 2023-03-20 | End: 2023-03-28 | Stop reason: HOSPADM

## 2023-03-20 RX ORDER — CARVEDILOL 3.12 MG/1
3.12 TABLET ORAL 2 TIMES DAILY WITH MEALS
Status: DISCONTINUED | OUTPATIENT
Start: 2023-03-20 | End: 2023-03-26

## 2023-03-20 RX ORDER — LISINOPRIL 20 MG/1
20 TABLET ORAL NIGHTLY
Status: DISCONTINUED | OUTPATIENT
Start: 2023-03-20 | End: 2023-03-20

## 2023-03-20 RX ORDER — ASPIRIN 81 MG/1
81 TABLET ORAL DAILY
Status: DISCONTINUED | OUTPATIENT
Start: 2023-03-20 | End: 2023-03-28 | Stop reason: HOSPADM

## 2023-03-20 RX ORDER — DESVENLAFAXINE 25 MG/1
25 TABLET, EXTENDED RELEASE ORAL DAILY
Status: DISCONTINUED | OUTPATIENT
Start: 2023-03-20 | End: 2023-03-28 | Stop reason: HOSPADM

## 2023-03-20 RX ADMIN — ROPINIROLE 2 MG: 2 TABLET, FILM COATED ORAL at 21:20

## 2023-03-20 RX ADMIN — INSULIN LISPRO 2 UNITS: 100 INJECTION, SOLUTION INTRAVENOUS; SUBCUTANEOUS at 22:06

## 2023-03-20 RX ADMIN — AMLODIPINE BESYLATE 10 MG: 10 TABLET ORAL at 17:30

## 2023-03-20 RX ADMIN — CARVEDILOL 3.12 MG: 3.12 TABLET, FILM COATED ORAL at 17:30

## 2023-03-20 RX ADMIN — LEVOTHYROXINE SODIUM 300 MCG: 0.15 TABLET ORAL at 17:29

## 2023-03-20 RX ADMIN — SODIUM PHOSPHATE, MONOBASIC, MONOHYDRATE AND SODIUM PHOSPHATE, DIBASIC, ANHYDROUS 10 MMOL: 276; 142 INJECTION, SOLUTION INTRAVENOUS at 21:20

## 2023-03-20 RX ADMIN — CEFTRIAXONE SODIUM 1 G: 1 INJECTION, POWDER, FOR SOLUTION INTRAMUSCULAR; INTRAVENOUS at 13:48

## 2023-03-20 RX ADMIN — CEFTRIAXONE SODIUM 1 G: 1 INJECTION, POWDER, FOR SOLUTION INTRAMUSCULAR; INTRAVENOUS at 17:30

## 2023-03-20 RX ADMIN — Medication 1 MG: at 17:30

## 2023-03-20 RX ADMIN — DESVENLAFAXINE SUCCINATE 25 MG: 25 TABLET, EXTENDED RELEASE ORAL at 17:30

## 2023-03-20 RX ADMIN — AZITHROMYCIN MONOHYDRATE 500 MG: 500 INJECTION, POWDER, LYOPHILIZED, FOR SOLUTION INTRAVENOUS at 14:33

## 2023-03-20 RX ADMIN — ACETAMINOPHEN 650 MG: 325 TABLET ORAL at 17:57

## 2023-03-20 RX ADMIN — AZITHROMYCIN DIHYDRATE 500 MG: 500 INJECTION, POWDER, LYOPHILIZED, FOR SOLUTION INTRAVENOUS at 18:00

## 2023-03-20 RX ADMIN — PANTOPRAZOLE SODIUM 40 MG: 40 TABLET, DELAYED RELEASE ORAL at 17:30

## 2023-03-20 RX ADMIN — ASPIRIN 81 MG: 81 TABLET, COATED ORAL at 17:30

## 2023-03-20 RX ADMIN — LEVETIRACETAM 250 MG: 250 TABLET, FILM COATED ORAL at 21:20
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
## 2023-03-20 NOTE — Clinical Note
Level of Care: Telemetry [5]   Diagnosis: Urinary tract infection without hematuria, site unspecified [[id number]]   Admitting Physician: TERRELL DELVALLE [[id number]]   Attending Physician: TERRELL DELVALLE [[id number]]

## 2023-03-20 NOTE — ED PROVIDER NOTES
" EMERGENCY DEPARTMENT ENCOUNTER    Room Number:  11/11  Date seen:  3/20/2023  PCP: Norman Serrato MD  Historian: Patient, patient's , paramedics      HPI:  Chief Complaint: Altered mental status, fever, cough, weakness  A complete HPI/ROS/PMH/PSH/SH/FH are somewhat limited due to: Patient's mental status change  Context: Zaina Martinez is a 57 y.o. female who presents to the ED via EMS from home for evaluation of new mental status change this morning.  Patient has multiple medical problems including end-stage renal disease on dialysis.  She was recently discharged from a skilled nursing facility about 1 week ago and return to her home where her  has been taking care of her.  He says a couple days ago he was helping to lift her up out of her wheelchair when he suddenly felt a pop in her ribs.  He is concerned that she might have suffered a rib fracture during that time.  This morning, she acted much more confused than normal and would not respond to him appropriately.  He says that she had a \"blank stare\" on her face and her arms were drawn up towards her body but she was not shaking or tremoring.  When paramedics arrived, they found the patient to be hypoxic.  Her  says that ordinarily she wears oxygen at night but does not require it during the daytime.  Her  tells me that patient did go to dialysis on Saturday as scheduled.  Next dialysis session will be scheduled for tomorrow.      PAST MEDICAL HISTORY  Active Ambulatory Problems     Diagnosis Date Noted   • Renal insufficiency 03/16/2018   • Hypertensive disorder 08/25/2021   • Hypothyroidism 11/29/2021   • Type 2 diabetes mellitus with kidney complication, with long-term current use of insulin (HCC) 05/01/2018   • Rheumatoid arthritis (HCC) 03/30/2021   • Angioedema 11/30/2021   • Esophageal dysmotility 10/15/2021   • Anemia 03/16/2018   • Medically noncompliant 12/01/2021   • Myocardial infarction due to demand ischemia ([facility]) " 12/01/2021   • Enteritis 12/03/2021   • PRES (posterior reversible encephalopathy syndrome) 12/05/2021   • Urine retention 12/08/2021   • Klebsiella infection 12/08/2021   • Superficial thrombophlebitis 12/10/2021   • Generalized weakness 12/19/2021   • ESRD (end stage renal disease) ([facility]) 12/20/2021   • CAD (coronary artery disease) 12/20/2021   • Abnormal urinalysis 12/20/2021   • Chronic diastolic CHF (congestive heart failure) ([facility]) 12/25/2021   • Pyelonephritis 01/09/2022   • Calculus of gallbladder with acute on chronic cholecystitis without obstruction 01/09/2022   • Pleural effusion on right 01/09/2022   • Anemia due to chronic kidney disease, on chronic dialysis ([facility]) 01/11/2022   • Abnormal findings on diagnostic imaging of other specified body structures 04/04/2017   • Acute upper respiratory infection 10/31/2018   • Agitation 03/30/2021   • Alkaline phosphatase raised 03/16/2018   • Casts present in urine 03/29/2021   • Cellulitis of toe 11/15/2019   • Hip pain 06/19/2020   • Community acquired pneumonia 02/13/2017   • Depressive disorder 10/31/2018   • Diarrhea of presumed infectious origin 03/30/2021   • Difficult or painful urination 08/11/2020   • Disease due to severe acute respiratory syndrome coronavirus 2 (SARS-CoV-2) 03/30/2021   • Dyspnea 11/15/2019   • Encounter for follow-up examination after completed treatment for conditions other than malignant neoplasm 02/13/2017   • H/O: hypothyroidism 08/25/2021   • Hyperlipidemia 11/30/2018   • Hypomagnesemia 03/30/2021   • Intractable vomiting with nausea 03/29/2021   • Leukocytosis 03/16/2018   • 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   • Need for influenza vaccination 11/30/2018   • Restless legs 11/15/2019   • Noncompliance with treatment 10/31/2018   • Shoulder pain 06/19/2020   • Acute UTI (urinary tract infection) 07/17/2018   • Metabolic encephalopathy 02/24/2022   • Abnormal findings on diagnostic imaging of abdomen 02/24/2022   • Status post  cholecystectomy 02/24/2022   • Hyponatremia 02/24/2022   • Acute metabolic encephalopathy 04/27/2022   • Encephalopathy, toxic 04/28/2022   • Acute CVA (cerebrovascular accident) ([facility]) 05/01/2022   • History of intracranial hemorrhage 05/01/2022   • Stroke (formerly [facility]) 05/13/2022   • Abnormal urinalysis 06/24/2022   • Diabetic muscle infarction ([facility]) 07/01/2022   • Other insomnia 07/01/2022   • Altered mental status 07/27/2022   • History of stroke- R MCA s/p TPA with subsequent ICH with debility 07/27/2022   • Heart murmur 07/27/2022   • Bradycardia 07/27/2022   • Left lower lobe pneumonia 07/27/2022   • Metabolic encephalopathy 07/29/2022   • Pericardial effusion 08/20/2022   • Pleural effusion 08/20/2022   • Acute pulmonary edema ([facility]) 09/03/2022   • Atrial flutter (formerly [facility]) 09/04/2022   • Chronic systolic CHF (congestive heart failure) ([facility]) 09/04/2022   • Thrombus of venous dialysis catheter ([facility]) 09/04/2022   • Sepsis without acute organ dysfunction ([facility]) 09/27/2022   • Type 2 diabetes mellitus with hyperglycemia, with long-term current use of insulin ([facility]) 10/06/2022   • Acute respiratory failure with hypoxia ([facility]) 10/06/2022   • Acute on chronic systolic CHF (congestive heart failure) (formerly [facility]) 10/06/2022   • Hyperkalemia 10/12/2022   • Acute respiratory failure with hypoxia ([facility]) 10/27/2022   • Other hypervolemia 10/31/2022   • Hematoma of right hip, initial encounter 11/09/2022   • Ureteral stent present 11/09/2022   • Closed intertrochanteric fracture of right femur ([facility]) 11/09/2022   • Witnessed seizure-like activity (formerly [facility]) 12/04/2022   • Acute respiratory failure with hypercapnia ([facility]) 12/26/2022   • Opioid use 12/26/2022   • Diabetic muscle infarction (formerly [facility]) 12/30/2022   • Acute posthemorrhagic anemia 12/31/2022   • Kidney hematoma 12/31/2022   • Pyuria 02/01/2023   • Severe malnutrition ([facility]) 02/02/2023   • Moderate malnutrition (formerly [facility]) 03/08/2023   • Hypophosphatemia 03/11/2023     Resolved Ambulatory Problems      Diagnosis Date Noted   • Hypertensive urgency 11/30/2021   • Leukocytosis 02/24/2022   • Hypoglycemia due to type 2 diabetes mellitus (HCC) 03/04/2023     Past Medical History:   Diagnosis Date   • Acute on chronic diastolic CHF (congestive heart failure) (HCC)    • Diabetes (HCC)    • Disease of thyroid gland    • GERD (gastroesophageal reflux disease)    • Hypertension    • Stage 5 chronic kidney disease (HCC)          PAST SURGICAL HISTORY  Past Surgical History:   Procedure Laterality Date   • CHOLECYSTECTOMY WITH INTRAOPERATIVE CHOLANGIOGRAM N/A 1/10/2022    Procedure: Laparoscopic cholecystectomy with intraoperative cholangiogram;  Surgeon: Ramana Raygoza MD;  Location: [facility] OR;  Service: General;  Laterality: N/A;   • CYSTOSCOPY W/ URETERAL STENT PLACEMENT Left 9/29/2022    Procedure: CYSTOSCOPY URETERAL STENT INSERTION WITH RETROGRADE PYELOGRAM;  Surgeon: Bryant Phan Jr., MD;  Location: [facility] OR;  Service: Urology;  Laterality: Left;   • CYSTOSCOPY W/ URETERAL STENT PLACEMENT Left 1/10/2023    Procedure: CYSTOSCOPY LEFT STENT REMOVAL;  Surgeon: Bryant Phan Jr., MD;  Location: [facility] OR;  Service: Urology;  Laterality: Left;   • EMBOLIZATION MESENTERIC ARTERY N/A 1/1/2023    Procedure: LEFT KIDNEY EMBOLIZATION;  Surgeon: Bijan Ordoñez MD;  Location: [facility] OR 18/19;  Service: Interventional Radiology;  Laterality: N/A;   • EYE SURGERY     • FEMUR IM NAILING/RODDING Right 11/10/2022    Procedure: FEMUR INTRAMEDULLARY NAILING/RODDING;  Surgeon: Glen Pierce MD;  Location: [facility] OR;  Service: Orthopedics;  Laterality: Right;   • HIP INTERTROCHANTERIC NAILING Right 11/10/2022    Procedure: HIP INTERTROCHANTERIC NAILING;  Surgeon: Glen Pierce MD;  Location: [facility] OR;  Service: Orthopedics;  Laterality: Right;   • HYSTERECTOMY     • INSERT CENTRAL LINE AT BEDSIDE  12/31/2022        • INSERTION HEMODIALYSIS CATHETER N/A 12/6/2021     Procedure: HEMODIALYSIS CATHETER INSERTION;  Surgeon: Keli Salazar MD;  Location: [facility] HYBRID OR 18/19;  Service: Vascular;  Laterality: N/A;   • INSERTION HEMODIALYSIS CATHETER N/A 5/3/2022    Procedure: TUNNELED CATHETER PLACEMENT;  Surgeon: Keli Salazar MD;  Location: [facility] MAIN OR;  Service: Vascular;  Laterality: N/A;   • MUSCLE BIOPSY Left 6/15/2022    Procedure: Left quadriceps muscle biopsy;  Surgeon: Marques Ma MD;  Location: [facility] MAIN OR;  Service: Neurosurgery;  Laterality: Left;   • URETEROSCOPY LASER LITHOTRIPSY WITH STENT INSERTION Left 12/30/2022    Procedure: CYSTOSCOPY WITH LEFT  URETEROSCOPY  LEFT STENT EXCHANGE;  Surgeon: Bryant Phan Jr., MD;  Location: [facility] OR;  Service: Urology;  Laterality: Left;         FAMILY HISTORY  Family History   Problem Relation Age of Onset   • Malig Hyperthermia Neg Hx          SOCIAL HISTORY  Social History     Socioeconomic History   • Marital status:      Spouse name: Marv   Tobacco Use   • Smoking status: Never   • Smokeless tobacco: Never   Vaping Use   • Vaping Use: Never used   Substance and Sexual Activity   • Alcohol use: Never   • Drug use: Never   • Sexual activity: Defer         ALLERGIES  Contrast dye (echo or unknown ct/mr), Iodinated contrast media, Ibuprofen, and Prochlorperazine        REVIEW OF SYSTEMS  Review of Systems   Unable to perform ROS: Mental status change   Respiratory: Positive for cough.    Neurological: Positive for weakness.   Psychiatric/Behavioral: Positive for confusion.            PHYSICAL EXAM  ED Triage Vitals [03/20/23 1015]   Temp Heart Rate Resp BP SpO2   99.1 °F (37.3 °C) 82 14 134/89 96 %      Temp src Heart Rate Source Patient Position BP Location FiO2 (%)   Oral Monitor -- -- --       Physical Exam      GENERAL: Ill-appearing lady, alert and will respond to verbal stimulation appropriately but appears fatigued  HENT: nares patent, normocephalic, dry mucous  membranes  EYES: no scleral icterus, normal conjunctiva  CV: regular rhythm, normal rate, normal pulses  RESPIRATORY: normal effort, breath sounds diminished at the bases, few scattered rhonchi noted, a couple of nonproductive cough noted during exam.  ABDOMEN: soft nontender in all quadrants  MUSCULOSKELETAL: no deformity, no asymmetry  NEURO: alert, moves all extremities, follows commands, diffuse mild weakness to all extremities but no obvious focal motor deficit pattern evident.  PSYCH:  calm, cooperative  SKIN: warm, dry    Vital signs and nursing notes reviewed.          LAB RESULTS  Recent Results (from the past 24 hour(s))   ECG 12 Lead Altered Mental Status    Collection Time: 03/20/23 11:12 AM   Result Value Ref Range    QT Interval 415 ms   Respiratory Panel PCR w/COVID-19(SARS-CoV-2) [facility] In-House, NP Swab in UTM/VTM, 3-4 HR TAT - Swab, Nasopharynx    Collection Time: 03/20/23 11:22 AM    Specimen: Nasopharynx; Swab   Result Value Ref Range    ADENOVIRUS, PCR Not Detected Not Detected    Coronavirus 229E Not Detected Not Detected    Coronavirus HKU1 Not Detected Not Detected    Coronavirus NL63 Not Detected Not Detected    Coronavirus OC43 Not Detected Not Detected    COVID19 Not Detected Not Detected - Ref. Range    Human Metapneumovirus Not Detected Not Detected    Human Rhinovirus/Enterovirus Not Detected Not Detected    Influenza A PCR Not Detected Not Detected    Influenza B PCR Not Detected Not Detected    Parainfluenza Virus 1 Not Detected Not Detected    Parainfluenza Virus 2 Not Detected Not Detected    Parainfluenza Virus 3 Not Detected Not Detected    Parainfluenza Virus 4 Not Detected Not Detected    RSV, PCR Not Detected Not Detected    Bordetella pertussis pcr Not Detected Not Detected    Bordetella parapertussis PCR Not Detected Not Detected    Chlamydophila pneumoniae PCR Not Detected Not Detected    Mycoplasma pneumo by PCR Not Detected Not Detected   Urinalysis  With Culture If Indicated - Urine, Catheter    Collection Time: 03/20/23 11:22 AM    Specimen: Urine, Catheter   Result Value Ref Range    Color, UA Yellow Yellow, Straw    Appearance, UA Turbid (A) Clear    pH, UA 6.0 5.0 - 8.0    Specific Gravity, UA 1.025 1.005 - 1.030    Glucose, UA Negative Negative    Ketones, UA 15 mg/dL (1+) (A) Negative    Bilirubin, UA Negative Negative    Blood, UA Large (3+) (A) Negative    Protein, UA >=300 mg/dL (3+) (A) Negative    Leuk Esterase, UA Large (3+) (A) Negative    Nitrite, UA Negative Negative    Urobilinogen, UA 0.2 E.U./dL 0.2 - 1.0 E.U./dL   Urinalysis, Microscopic Only - Urine, Catheter    Collection Time: 03/20/23 11:22 AM    Specimen: Urine, Catheter   Result Value Ref Range    RBC, UA Unable to determine due to loaded field (A) None Seen, 0-2 /HPF    WBC, UA Too Numerous to Count (A) None Seen, 0-2 /HPF    Bacteria, UA Unable to determine due to loaded field (A) None Seen /HPF    Squamous Epithelial Cells, UA Unable to determine due to loaded field (A) None Seen, 0-2 /HPF    Hyaline Casts, UA Unable to determine due to loaded field None Seen /LPF    Methodology Manual Light Microscopy    Blood Gas, Arterial -    Collection Time: 03/20/23 11:45 AM    Specimen: Arterial Blood   Result Value Ref Range    Site Arterial: right radial     Omar's Test Positive     pH, Arterial 7.425 7.350 - 7.450 pH units    pCO2, Arterial 46.6 (H) 35.0 - 45.0 mm Hg    pO2, Arterial 67.4 (L) 80.0 - 100.0 mm Hg    HCO3, Arterial 30.6 (H) 22.0 - 28.0 mmol/L    Base Excess, Arterial 5.6 (H) 0.0 - 2.0 mmol/L    O2 Saturation Calculated 93.3 92.0 - 99.0 %    Barometric Pressure for Blood Gas 756.9 mmHg    Modality Cannula     Flow Rate 4 lpm    Rate 18 Breaths/minute   Comprehensive Metabolic Panel    Collection Time: 03/20/23 12:03 PM    Specimen: Blood   Result Value Ref Range    Glucose 214 (H) 65 - 99 mg/dL    BUN 33 (H) 6 - 20 mg/dL    Creatinine 3.11 (H) 0.57 - 1.00 mg/dL    Sodium 133  (L) 136 - 145 mmol/L    Potassium 4.2 3.5 - 5.2 mmol/L    Chloride 95 (L) 98 - 107 mmol/L    CO2 25.7 22.0 - 29.0 mmol/L    Calcium 8.2 (L) 8.6 - 10.5 mg/dL    Total Protein 5.7 (L) 6.0 - 8.5 g/dL    Albumin 2.4 (L) 3.5 - 5.2 g/dL    ALT (SGPT) <5 1 - 33 U/L    AST (SGOT) 13 1 - 32 U/L    Alkaline Phosphatase 137 (H) 39 - 117 U/L    Total Bilirubin 0.3 0.0 - 1.2 mg/dL    Globulin 3.3 gm/dL    A/G Ratio 0.7 g/dL    BUN/Creatinine Ratio 10.6 7.0 - 25.0    Anion Gap 12.3 5.0 - 15.0 mmol/L    eGFR 16.9 (L) >60.0 mL/min/1.73   Procalcitonin    Collection Time: 03/20/23 12:03 PM    Specimen: Blood   Result Value Ref Range    Procalcitonin 0.85 (H) 0.00 - 0.25 ng/mL   Lactic Acid, Plasma    Collection Time: 03/20/23 12:03 PM    Specimen: Blood   Result Value Ref Range    Lactate 0.6 0.5 - 2.0 mmol/L   CBC Auto Differential    Collection Time: 03/20/23 12:03 PM    Specimen: Blood   Result Value Ref Range    WBC 13.57 (H) 3.40 - 10.80 10*3/mm3    RBC 2.61 (L) 3.77 - 5.28 10*6/mm3    Hemoglobin 7.0 (L) 12.0 - 15.9 g/dL    Hematocrit 22.1 (L) 34.0 - 46.6 %    MCV 84.7 79.0 - 97.0 fL    MCH 26.8 26.6 - 33.0 pg    MCHC 31.7 31.5 - 35.7 g/dL    RDW 17.5 (H) 12.3 - 15.4 %    RDW-SD 53.8 37.0 - 54.0 fl    MPV 9.4 6.0 - 12.0 fL    Platelets 353 140 - 450 10*3/mm3    Neutrophil % 84.6 (H) 42.7 - 76.0 %    Lymphocyte % 8.3 (L) 19.6 - 45.3 %    Monocyte % 5.8 5.0 - 12.0 %    Eosinophil % 0.3 0.3 - 6.2 %    Basophil % 0.4 0.0 - 1.5 %    Immature Grans % 0.6 (H) 0.0 - 0.5 %    Neutrophils, Absolute 11.49 (H) 1.70 - 7.00 10*3/mm3    Lymphocytes, Absolute 1.12 0.70 - 3.10 10*3/mm3    Monocytes, Absolute 0.79 0.10 - 0.90 10*3/mm3    Eosinophils, Absolute 0.04 0.00 - 0.40 10*3/mm3    Basophils, Absolute 0.05 0.00 - 0.20 10*3/mm3    Immature Grans, Absolute 0.08 (H) 0.00 - 0.05 10*3/mm3    nRBC 0.0 0.0 - 0.2 /100 WBC       Ordered the above labs and reviewed the results.        RADIOLOGY  CT Head Without Contrast    Result Date:  3/20/2023  EMERGENCY NONCONTRAST HEAD CT 03/20/2023  CLINICAL HISTORY: Altered mental status.  TECHNIQUE: Spiral CT images were obtained from the base of the skull to the vertex without intravenous contrast. The images were reformatted and are submitted in 3 mm thick axial, sagittal and coronal CT sections with brain algorithm.  This is correlated to a prior noncontrast head CT from [facility] on 01/13/2023 and a prior MRI of the brain on 04/30/2022 and head CTs on 04/29/2022 and 04/30/2022 and 05/01/2022.  FINDINGS: Back on 04/30/2022 the patient had a 2.3 x 1.7 cm intraparenchymal hemorrhage in the superior left frontal white matter. Now there is a slitlike area of increased density at the site of the prior hemorrhage measuring 9 x 2 mm in size and likely hemosiderin deposition at the site of the prior old hemorrhage.  This hyperdense focus has been present on multiple prior head CTs dating back to 07/27/2022 and is unchanged.. There is some patchy low-density extending from the periventricular into the subcortical white matter of the cerebral hemispheres consistent with mild-to-moderate small vessel disease. The remainder of the brain parenchyma is normal in attenuation. The ventricles are normal in size. I see no mass effect and no midline shift and no extra-axial fluid collections are identified. There is no evidence of acute intracranial hemorrhage. Calvarium and skull base are normal in appearance.  The paranasal sinuses, mastoid air cells and middle ear cavities are clear.      1. No acute intracranial abnormality is identified with no change when compared to multiple prior head CTs dating back to 07/27/2022. 2. There is mild-to-moderate small vessel disease in the cerebral white matter and mild diffuse cerebral atrophy. There is 9 x 2 mm hyperdense focus of the superior left frontal white matter at the site of a prior intraparenchymal hemorrhage that occurred back in April 2022 and is felt to  be an area of hemosiderin deposition from an old hemorrhage at this site and this focus of hyperdensity is unchanged dating back to 07/27/2022. The remainder of the head CT is normal.  Radiation dose reduction techniques were utilized, including automated exposure control and exposure modulation based on body size.       CT Chest Without Contrast Diagnostic    Result Date: 3/20/2023  CT OF THE CHEST WITHOUT CONTRAST 03/20/2023  HISTORY: Cough. Fever. Abnormal left hemithorax on today's chest radiograph.  Spiral images were obtained from the lung apices to the upper abdomen. No intravenous contrast was given.  There is a moderately large left pleural effusion with moderately severe atelectasis and/or consolidation of the left lower lobe.  Small right pleural effusion is seen with some mild right lower lobe atelectasis or consolidation.  No pathologically enlarged hilar or mediastinal lymph nodes are seen. Aorta is not dilated.      1. Moderately large left pleural effusion with some left lower lobe atelectasis and/or consolidation. 2. Small right pleural effusion with some mild right lower lobe atelectasis and/or consolidation. 3. Short-term follow-up CT of the chest suggested.  Radiation dose reduction techniques were utilized, including automated exposure control and exposure modulation based on body size.       XR Chest 1 View    Result Date: 3/20/2023  XR CHEST 1 VW-  HISTORY: Female who is 57 years-old,  cough and fever  TECHNIQUE: Frontal view of the chest  COMPARISON: 03/04/2023  FINDINGS: Right-sided central venous catheter appears stable. The heart is enlarged. Pulmonary vasculature is unremarkable. Increased left sided atelectasis/infiltrate and pleural effusion. No pneumothorax. No acute osseous process.      Interval worsening with increased left-sided atelectasis/infiltrate and pleural effusion.  This report was finalized on 3/20/2023 12:07 PM by Dr. Jared Kern M.D.        Ordered the above noted  radiological studies. Reviewed by me in PACS.          PROCEDURES  Procedures  EKG           EKG time/Interp time: 1112/1119  Rhythm/Rate: Sinus rhythm, 71 bpm  P waves and NC: Present, 160 ms  QRS, axis: 101 ms, left axis deviation  ST and T waves: No ST segment elevations notable.    Independently interpreted by me contemporaneously with treatment          MEDICATIONS GIVEN IN ER  Medications   sodium chloride 0.9 % flush 10 mL (has no administration in time range)   azithromycin (ZITHROMAX) 500 mg in sodium chloride 0.9 % 250 mL IVPB-VTB (500 mg Intravenous New Bag 3/20/23 1433)   cefTRIAXone (ROCEPHIN) 1 g in sodium chloride 0.9 % 100 mL IVPB-VTB (0 g Intravenous Stopped 3/20/23 1432)           MEDICAL DECISION MAKING, PROGRESS, and CONSULTS    All labs have been independently reviewed by me.  All radiology studies have been reviewed by me and I have also reviewed the radiology report.   EKG's independently viewed and interpreted by me.  Discussion below represents my analysis of pertinent findings related to patient's condition, differential diagnosis, treatment plan and final disposition.      Additional sources:  - Discussed/ obtained information from independent historians: Discussed with patient's  who provided the majority of the history.  Also discussed with paramedics on arrival to receive report from them.    - External (non-ED) record review: I reviewed the most recent hospital discharge summary from March 11, 2023 when she was admitted here for hypoglycemia with a blood sugar as low as 19 at 1 point in time.  Patient also treated for hypophosphatemia.    - Chronic or social conditions impacting care: End-stage renal disease on dialysis, multiple medical problems.  Limited mobility        Orders placed during this visit:  Orders Placed This Encounter   Procedures   • Respiratory Panel PCR w/COVID-19(SARS-CoV-2) [facility] In-House, NP Swab in UTM/VTM, 3-4 HR TAT - Swab,  Nasopharynx   • Blood Culture - Blood,   • Blood Culture - Blood,   • Urine Culture - Urine,   • XR Chest 1 View   • CT Head Without Contrast   • CT Chest Without Contrast Diagnostic   • Comprehensive Metabolic Panel   • Urinalysis With Culture If Indicated - Urine, Catheter   • Procalcitonin   • Lactic Acid, Plasma   • CBC Auto Differential   • Blood Gas, Arterial -   • Blood Gas, Arterial -   • Urinalysis, Microscopic Only - Urine, Clean Catch   • Monitor Blood Pressure   • Cardiac Monitoring   • Pulse Oximetry, Continuous   • LIPPS (on-call MD unless specified)   • ECG 12 Lead Altered Mental Status   • Type & Screen   • Insert Peripheral IV   • Inpatient Admission   • ED Bed Request   • CBC & Differential               Differential diagnosis:    Acute stroke, UTI, CO2 narcosis, pneumonia, metabolic encephalopathy, hypoglycemia      Independent interpretation of labs, radiology studies, and discussions with consultants:  ED Course as of 03/20/23 1513   Mon Mar 20, 2023   1112 Initial blood sugar reported by patient's  seem to be okay therefore I do not suspect hypoglycemia at this event.  However we will need to monitor that carefully.  Initiating a broad work-up at this time to include exploration of possible infectious or metabolic encephalopathy origins.  We will also get a head CT to look for any signs of acute central neurologic emergency problems to explain this mental status change as well. [ANTOINE]   1334 RBC, UA(!): Unable to determine due to loaded field [ANTOINE]   1334 Bacteria, UA(!): Unable to determine due to loaded field [ANTOINE]   1334 WBC, UA(!): Too Numerous to Count [ANTOINE]   1335 Leukocytes, UA(!): Large (3+) [ANTOINE]   1335 Creatinine(!): 3.11 [ANTOINE]   1339 I just discussed with Dr. Basurto about the patient's head CT.  He does not find any acute intracranial changes in comparison to records on file from the past. [ANTOINE]   1339 I independently interpreted the chest x-ray and my findings are: Left-sided diffuse  infiltrative pattern [ANTOINE]   1410 I just discussed with Dr. Pena about this patient on the telephone and he agrees to accept the patient for further management today. [ANTOINE]   1413 Hemoglobin(!): 7.0 [ANTOINE]   1413 Hematocrit(!): 22.1 [ANTOINE]   1415 No report of recent blood loss.  I presume that her anemia is due to chronic disease.  We will add a type and screen in case transfusion becomes necessary during this hospitalization. [ANTOINE]      ED Course User Index  [ANTOINE] Andrea Ocampo MD             Patient was placed in face mask during triage.  Patient was wearing face mask throughout encounter.  I wore personal protective equipment throughout the encounter.  Hand hygiene was performed before and after patient encounter.     DIAGNOSIS  Final diagnoses:   Urinary tract infection without hematuria, site unspecified   Pneumonia of left lower lobe due to infectious organism   Altered mental status, unspecified altered mental status type   End stage renal disease (HCC)   Anemia due to chronic kidney disease, on chronic dialysis (HCC)   Pleural effusion   Hypoxia         DISPOSITION  Admit to [facility], telemetry            Latest Documented Vital Signs:  As of 15:13 EDT  BP- 148/73 HR- 68 Temp- 99.1 °F (37.3 °C) (Oral) O2 sat- 94%              --    Please note that portions of this were completed with a voice recognition program.       Note Disclaimer: At [facility], we believe that sharing information builds trust and better relationships. You are receiving this note because you are receiving care at [facility] or recently visited. It is possible you will see health information before a provider has talked with you about it. This kind of information can be easy to misunderstand. To help you fully understand what it means for your health, we urge you to discuss this note with your provider.           Andrea Ocampo MD  03/20/23 2166

## 2023-03-20 NOTE — ED NOTES
"Nursing report ED to floor  Zaina Martinez  57 y.o.  female    HPI :   Chief Complaint   Patient presents with    Altered Mental Status    Chills       Admitting doctor:   Dimitri Pena MD    Admitting diagnosis:   The primary encounter diagnosis was Urinary tract infection without hematuria, site unspecified. Diagnoses of Pneumonia of left lower lobe due to infectious organism, Altered mental status, unspecified altered mental status type, End stage renal disease (HCC), and Anemia due to chronic kidney disease, on chronic dialysis (HCC) were also pertinent to this visit.    Code status:   Current Code Status       Date Active Code Status Order ID Comments User Context       Prior            Allergies:   Contrast dye (echo or unknown ct/mr), Iodinated contrast media, Ibuprofen, and Prochlorperazine    Isolation:   No active isolations    Intake and Output    Intake/Output Summary (Last 24 hours) at 3/20/2023 1433  Last data filed at 3/20/2023 1432  Gross per 24 hour   Intake 100 ml   Output --   Net 100 ml       Weight:       03/20/23  1015   Weight: 59 kg (130 lb)       Most recent vitals:   Vitals:    03/20/23 1015 03/20/23 1100 03/20/23 1119 03/20/23 1221   BP: 134/89  153/69 146/69   Pulse: 82 70 74 70   Resp: 14      Temp: 99.1 °F (37.3 °C)      TempSrc: Oral      SpO2: 96% 95% 94% 94%   Weight: 59 kg (130 lb)      Height: 157.5 cm (62\")          Active LDAs/IV Access:   Lines, Drains & Airways       Active LDAs       Name Placement date Placement time Site Days    Peripheral IV 03/20/23 1159 Right Antecubital 03/20/23  1159  Antecubital  less than 1    Hemodialysis Cath Double 05/03/22  0909  Internal Jugular  321                    Labs (abnormal labs have a star):   Labs Reviewed   COMPREHENSIVE METABOLIC PANEL - Abnormal; Notable for the following components:       Result Value    Glucose 214 (*)     BUN 33 (*)     Creatinine 3.11 (*)     Sodium 133 (*)     Chloride 95 (*)     Calcium 8.2 (*)     Total Protein " "5.7 (*)     Albumin 2.4 (*)     Alkaline Phosphatase 137 (*)     eGFR 16.9 (*)     All other components within normal limits    Narrative:     GFR Normal >60  Chronic Kidney Disease <60  Kidney Failure <15     URINALYSIS W/ CULTURE IF INDICATED - Abnormal; Notable for the following components:    Appearance, UA Turbid (*)     Ketones, UA 15 mg/dL (1+) (*)     Blood, UA Large (3+) (*)     Protein, UA >=300 mg/dL (3+) (*)     Leuk Esterase, UA Large (3+) (*)     All other components within normal limits    Narrative:     In absence of clinical symptoms, the presence of pyuria, bacteria, and/or nitrites on the urinalysis result does not correlate with infection.   PROCALCITONIN - Abnormal; Notable for the following components:    Procalcitonin 0.85 (*)     All other components within normal limits    Narrative:     As a Marker for Sepsis (Non-Neonates):    1. <0.5 ng/mL represents a low risk of severe sepsis and/or septic shock.  2. >2 ng/mL represents a high risk of severe sepsis and/or septic shock.    As a Marker for Lower Respiratory Tract Infections that require antibiotic therapy:    PCT on Admission    Antibiotic Therapy       6-12 Hrs later    >0.5                Strongly Recommended  >0.25 - <0.5        Recommended   0.1 - 0.25          Discouraged              Remeasure/reassess PCT  <0.1                Strongly Discouraged     Remeasure/reassess PCT    As 28 day mortality risk marker: \"Change in Procalcitonin Result\" (>80% or <=80%) if Day 0 (or Day 1) and Day 4 values are available. Refer to [email or web address]-[email or web address]-[email or web address]    Change in PCT <=80%  A decrease of PCT levels below or equal to 80% defines a positive change in PCT test result representing a higher risk for 28-day all-cause mortality of patients diagnosed with severe sepsis for septic shock.    Change in PCT >80%  A decrease of PCT levels of more than 80% defines a negative change in PCT result representing a lower risk for 28-day all-cause " mortality of patients diagnosed with severe sepsis or septic shock.      CBC WITH AUTO DIFFERENTIAL - Abnormal; Notable for the following components:    WBC 13.57 (*)     RBC 2.61 (*)     Hemoglobin 7.0 (*)     Hematocrit 22.1 (*)     RDW 17.5 (*)     Neutrophil % 84.6 (*)     Lymphocyte % 8.3 (*)     Immature Grans % 0.6 (*)     Neutrophils, Absolute 11.49 (*)     Immature Grans, Absolute 0.08 (*)     All other components within normal limits   BLOOD GAS, ARTERIAL - Abnormal; Notable for the following components:    pCO2, Arterial 46.6 (*)     pO2, Arterial 67.4 (*)     HCO3, Arterial 30.6 (*)     Base Excess, Arterial 5.6 (*)     All other components within normal limits   URINALYSIS, MICROSCOPIC ONLY - Abnormal; Notable for the following components:    RBC, UA Unable to determine due to loaded field (*)     WBC, UA Too Numerous to Count (*)     Bacteria, UA Unable to determine due to loaded field (*)     Squamous Epithelial Cells, UA Unable to determine due to loaded field (*)     All other components within normal limits   RESPIRATORY PANEL PCR W/ COVID-19 (SARS-COV-2) NAZ/SINAI/WES/PAD/COR/MAD/ABIGAIL IN-HOUSE, [facility], 3-4 HR TAT - Normal    Narrative:     In the setting of a positive respiratory panel with a viral infection PLUS a negative procalcitonin without other underlying concern for bacterial infection, consider observing off antibiotics or discontinuation of antibiotics and continue supportive care. If the respiratory panel is positive for atypical bacterial infection (Bordetella pertussis, Chlamydophila pneumoniae, or Mycoplasma pneumoniae), consider antibiotic de-escalation to target atypical bacterial infection.   LACTIC ACID, PLASMA - Normal   BLOOD CULTURE   BLOOD CULTURE   URINE CULTURE   BLOOD GAS, ARTERIAL   TYPE AND SCREEN   CBC AND DIFFERENTIAL    Narrative:     The following orders were created for panel order CBC & Differential.  Procedure                               Abnormality          Status                     ---------                               -----------         ------                     CBC Auto Differential[[id number]]        Abnormal            Final result                 Please view results for these tests on the individual orders.       EKG:   ECG 12 Lead Altered Mental Status   Preliminary Result   HEART RATE= 71  bpm   RR Interval= 845  ms   TN Interval= 160  ms   P Horizontal Axis= 61  deg   P Front Axis= 49  deg   QRSD Interval= 101  ms   QT Interval= 415  ms   QRS Axis= -33  deg   T Wave Axis= 43  deg   - OTHERWISE NORMAL ECG -   Sinus rhythm   Left axis deviation   Baseline wander in lead(s) I,II,III,aVR,aVF,V1,V4,V5   Electronically Signed By:    Date and Time of Study: 2023-03-20 11:12:37          Meds given in ED:   Medications   sodium chloride 0.9 % flush 10 mL (has no administration in time range)   azithromycin (ZITHROMAX) 500 mg in sodium chloride 0.9 % 250 mL IVPB-VTB (500 mg Intravenous New Bag 3/20/23 1433)   cefTRIAXone (ROCEPHIN) 1 g in sodium chloride 0.9 % 100 mL IVPB-VTB (0 g Intravenous Stopped 3/20/23 1432)       Imaging results:  CT Head Without Contrast    Result Date: 3/20/2023  1. No acute intracranial abnormality is identified with no change when compared to multiple prior head CTs dating back to 07/27/2022. 2. There is mild-to-moderate small vessel disease in the cerebral white matter and mild diffuse cerebral atrophy. There is 9 x 2 mm hyperdense focus of the superior left frontal white matter at the site of a prior intraparenchymal hemorrhage that occurred back in April 2022 and is felt to be an area of hemosiderin deposition from an old hemorrhage at this site and this focus of hyperdensity is unchanged dating back to 07/27/2022. The remainder of the head CT is normal.  Radiation dose reduction techniques were utilized, including automated exposure control and exposure modulation based on body size.       XR Chest 1 View    Result Date:  3/20/2023  Interval worsening with increased left-sided atelectasis/infiltrate and pleural effusion.  This report was finalized on 3/20/2023 12:07 PM by Dr. Jared Kern M.D.       Ambulatory status:   - assist    Social issues:   Social History     Socioeconomic History    Marital status:      Spouse name: Marv   Tobacco Use    Smoking status: Never    Smokeless tobacco: Never   Vaping Use    Vaping Use: Never used   Substance and Sexual Activity    Alcohol use: Never    Drug use: Never    Sexual activity: Defer       NIH Stroke Scale:         Marina Mcfarland RN  03/20/23 14:33 EDT

## 2023-03-20 NOTE — H&P
History and physical    Primary care physician      Chief complaint  Altered mental status  Fever cough congestion  Generalized weakness    History of present illness  57-year-old white female with very complex past medical history including end-stage renal disease on hemodialysis insulin-dependent diabetes mellitus congestive heart failure hypothyroidism hypertension right MCA stroke and chronic anemia presented to [facility] with altered mental status fever cough and generalized weakness.  Patient evaluated in ER found to have left basilar pneumonia and acute UTI with metabolic encephalopathy admit for management.  Patient able to answer all questions but most of the history obtained from the  old record nursing staff.  Patient has no chest pain increase shortness of breath palpitation nausea vomiting diarrhea.    PAST MEDICAL HISTORY  • Congestive heart failure     • Diabetes melitis     • Hypothyroidism     • Gastroesophageal reflux disease     • Hypertension     • End-stage renal disease on hemodialysis        PAST SURGICAL HISTORY              Procedure Laterality Date   • CHOLECYSTECTOMY WITH INTRAOPERATIVE CHOLANGIOGRAM N/A 1/10/2022     Procedure: Laparoscopic cholecystectomy with intraoperative cholangiogram;  Surgeon: Ramana Raygoza MD;  Location: [facility];  Service: General;  Laterality: N/A;   • CYSTOSCOPY W/ URETERAL STENT PLACEMENT Left 9/29/2022     Procedure: CYSTOSCOPY URETERAL STENT INSERTION WITH RETROGRADE PYELOGRAM;  Surgeon: Bryant Phan Jr., MD;  Location: [facility];  Service: Urology;  Laterality: Left;   • CYSTOSCOPY W/ URETERAL STENT PLACEMENT Left 1/10/2023     Procedure: CYSTOSCOPY LEFT STENT REMOVAL;  Surgeon: Bryant Phan Jr., MD;  Location: [facility];  Service: Urology;  Laterality: Left;   • EMBOLIZATION MESENTERIC ARTERY N/A 1/1/2023     Procedure: LEFT KIDNEY EMBOLIZATION;  Surgeon: Bijan Ordoñez MD;  Location: [facility] OR 18/19;  Service: Interventional Radiology;  Laterality: N/A;   • EYE SURGERY       • FEMUR IM NAILING/RODDING Right 11/10/2022     Procedure: FEMUR INTRAMEDULLARY NAILING/RODDING;  Surgeon: Glen Pierce MD;  Location: [facility];  Service: Orthopedics;  Laterality: Right;   • HIP INTERTROCHANTERIC NAILING Right 11/10/2022     Procedure: HIP INTERTROCHANTERIC NAILING;  Surgeon: Glen Pierce MD;  Location: [facility];  Service: Orthopedics;  Laterality: Right;   • HYSTERECTOMY       • INSERT CENTRAL LINE AT BEDSIDE   12/31/2022         • INSERTION HEMODIALYSIS CATHETER N/A 12/6/2021     Procedure: HEMODIALYSIS CATHETER INSERTION;  Surgeon: Keli Salazar MD;  Location: [facility] OR 18/19;  Service: Vascular;  Laterality: N/A;   • INSERTION HEMODIALYSIS CATHETER N/A 5/3/2022     Procedure: TUNNELED CATHETER PLACEMENT;  Surgeon: Keli Salazar MD;  Location: [facility];  Service: Vascular;  Laterality: N/A;   • MUSCLE BIOPSY Left 6/15/2022     Procedure: Left quadriceps muscle biopsy;  Surgeon: Marques Ma MD;  Location: [facility];  Service: Neurosurgery;  Laterality: Left;   • URETEROSCOPY LASER LITHOTRIPSY WITH STENT INSERTION Left 12/30/2022     Procedure: CYSTOSCOPY WITH LEFT  URETEROSCOPY  LEFT STENT EXCHANGE;  Surgeon: Bryant Phan Jr., MD;  Location: [facility];  Service: Urology;  Laterality: Left;         FAMILY HISTORY           Problem Relation Age of Onset   • Malig Hyperthermia Neg Hx        SOCIAL HISTORY                 Socioeconomic History   • Marital status:        Spouse name: Marv   Tobacco Use   • Smoking status: Never   • Smokeless tobacco: Never   Vaping Use   • Vaping Use: Never used   Substance and Sexual Activity   • Alcohol use: Never   • Drug use: Never   • Sexual activity: Defer         ALLERGIES  Contrast dye (echo or unknown ct/mr), Iodinated contrast media, Ibuprofen, and Prochlorperazine  Home  "medications reviewed     REVIEW OF SYSTEMS  Per history of present illness    PHYSICAL EXAM  Blood pressure 148/73, pulse 68, temperature 99.1 °F (37.3 °C), temperature source Oral, resp. rate 14, height 157.5 cm (62\"), weight 59 kg (130 lb), SpO2 94 %, not currently breastfeeding.    GENERAL:  Awake and alert ill-appearing lady, in no respiratory distress  HEENT:  Unremarkable  NECK:  Supple  CV: regular rhythm, normal rate, normal pulses  RESPIRATORY: normal effort, breath sounds diminished at the bases, few scattered rhonchi noted, a couple of nonproductive cough noted during exam.  ABDOMEN: soft nontender in all quadrants  MUSCULOSKELETAL: no deformity, no asymmetry  NEURO: alert, moves all extremities, follows commands, diffuse mild weakness to all extremities but no obvious focal motor deficit pattern evident.  PSYCH:  calm, cooperative  SKIN: warm, dry     LAB RESULTS  Lab Results (last 24 hours)     Procedure Component Value Units Date/Time    Respiratory Panel PCR w/COVID-19(SARS-CoV-2) NAZ/SINAI/WES/PAD/COR/MAD/ABIGAIL In-House, NP Swab in UTM/VTM, 3-4 HR TAT - Swab, Nasopharynx [[id number]]  (Normal) Collected: 03/20/23 1122    Specimen: Swab from Nasopharynx Updated: 03/20/23 1359     ADENOVIRUS, PCR Not Detected     Coronavirus 229E Not Detected     Coronavirus HKU1 Not Detected     Coronavirus NL63 Not Detected     Coronavirus OC43 Not Detected     COVID19 Not Detected     Human Metapneumovirus Not Detected     Human Rhinovirus/Enterovirus Not Detected     Influenza A PCR Not Detected     Influenza B PCR Not Detected     Parainfluenza Virus 1 Not Detected     Parainfluenza Virus 2 Not Detected     Parainfluenza Virus 3 Not Detected     Parainfluenza Virus 4 Not Detected     RSV, PCR Not Detected     Bordetella pertussis pcr Not Detected     Bordetella parapertussis PCR Not Detected     Chlamydophila pneumoniae PCR Not Detected     Mycoplasma pneumo by PCR Not Detected    Narrative:      In the setting of a " "positive respiratory panel with a viral infection PLUS a negative procalcitonin without other underlying concern for bacterial infection, consider observing off antibiotics or discontinuation of antibiotics and continue supportive care. If the respiratory panel is positive for atypical bacterial infection (Bordetella pertussis, Chlamydophila pneumoniae, or Mycoplasma pneumoniae), consider antibiotic de-escalation to target atypical bacterial infection.    Procalcitonin [[id number]]  (Abnormal) Collected: 03/20/23 1203    Specimen: Blood Updated: 03/20/23 1242     Procalcitonin 0.85 ng/mL     Narrative:      As a Marker for Sepsis (Non-Neonates):    1. <0.5 ng/mL represents a low risk of severe sepsis and/or septic shock.  2. >2 ng/mL represents a high risk of severe sepsis and/or septic shock.    As a Marker for Lower Respiratory Tract Infections that require antibiotic therapy:    PCT on Admission    Antibiotic Therapy       6-12 Hrs later    >0.5                Strongly Recommended  >0.25 - <0.5        Recommended   0.1 - 0.25          Discouraged              Remeasure/reassess PCT  <0.1                Strongly Discouraged     Remeasure/reassess PCT    As 28 day mortality risk marker: \"Change in Procalcitonin Result\" (>80% or <=80%) if Day 0 (or Day 1) and Day 4 values are available. Refer to http://www.[facility]    Change in PCT <=80%  A decrease of PCT levels below or equal to 80% defines a positive change in PCT test result representing a higher risk for 28-day all-cause mortality of patients diagnosed with severe sepsis for septic shock.    Change in PCT >80%  A decrease of PCT levels of more than 80% defines a negative change in PCT result representing a lower risk for 28-day all-cause mortality of patients diagnosed with severe sepsis or septic shock.       Comprehensive Metabolic Panel [[id number]]  (Abnormal) Collected: 03/20/23 1203    Specimen: Blood Updated: 03/20/23 1237     Glucose 214 " mg/dL      BUN 33 mg/dL      Creatinine 3.11 mg/dL      Sodium 133 mmol/L      Potassium 4.2 mmol/L      Chloride 95 mmol/L      CO2 25.7 mmol/L      Calcium 8.2 mg/dL      Total Protein 5.7 g/dL      Albumin 2.4 g/dL      ALT (SGPT) <5 U/L      AST (SGOT) 13 U/L      Alkaline Phosphatase 137 U/L      Total Bilirubin 0.3 mg/dL      Globulin 3.3 gm/dL      A/G Ratio 0.7 g/dL      BUN/Creatinine Ratio 10.6     Anion Gap 12.3 mmol/L      eGFR 16.9 mL/min/1.73     Narrative:      GFR Normal >60  Chronic Kidney Disease <60  Kidney Failure <15      Lactic Acid, Plasma [[id number]]  (Normal) Collected: 03/20/23 1203    Specimen: Blood Updated: 03/20/23 1235     Lactate 0.6 mmol/L     CBC & Differential [[id number]]  (Abnormal) Collected: 03/20/23 1203    Specimen: Blood Updated: 03/20/23 1220    Narrative:      The following orders were created for panel order CBC & Differential.  Procedure                               Abnormality         Status                     ---------                               -----------         ------                     CBC Auto Differential[[id number]]        Abnormal            Final result                 Please view results for these tests on the individual orders.    CBC Auto Differential [[id number]]  (Abnormal) Collected: 03/20/23 1203    Specimen: Blood Updated: 03/20/23 1220     WBC 13.57 10*3/mm3      RBC 2.61 10*6/mm3      Hemoglobin 7.0 g/dL      Hematocrit 22.1 %      MCV 84.7 fL      MCH 26.8 pg      MCHC 31.7 g/dL      RDW 17.5 %      RDW-SD 53.8 fl      MPV 9.4 fL      Platelets 353 10*3/mm3      Neutrophil % 84.6 %      Lymphocyte % 8.3 %      Monocyte % 5.8 %      Eosinophil % 0.3 %      Basophil % 0.4 %      Immature Grans % 0.6 %      Neutrophils, Absolute 11.49 10*3/mm3      Lymphocytes, Absolute 1.12 10*3/mm3      Monocytes, Absolute 0.79 10*3/mm3      Eosinophils, Absolute 0.04 10*3/mm3      Basophils, Absolute 0.05 10*3/mm3      Immature Grans, Absolute 0.08 10*3/mm3       nRBC 0.0 /100 WBC     Urinalysis, Microscopic Only - Urine, Catheter [[id number]]  (Abnormal) Collected: 03/20/23 1122    Specimen: Urine, Catheter Updated: 03/20/23 1218     RBC, UA       Unable to determine due to loaded field     /HPF     WBC, UA Too Numerous to Count /HPF      Bacteria, UA       Unable to determine due to loaded field     /HPF     Squamous Epithelial Cells, UA       Unable to determine due to loaded field     /HPF     Hyaline Casts, UA       Unable to determine due to loaded field     /LPF     Methodology Manual Light Microscopy    Urine Culture - Urine, Urine, Catheter [[id number]] Collected: 03/20/23 1122    Specimen: Urine, Catheter Updated: 03/20/23 1218    Urinalysis With Culture If Indicated - Urine, Catheter [[id number]]  (Abnormal) Collected: 03/20/23 1122    Specimen: Urine, Catheter Updated: 03/20/23 1217     Color, UA Yellow     Appearance, UA Turbid     pH, UA 6.0     Specific Gravity, UA 1.025     Glucose, UA Negative     Ketones, UA 15 mg/dL (1+)     Bilirubin, UA Negative     Blood, UA Large (3+)     Protein, UA >=300 mg/dL (3+)     Leuk Esterase, UA Large (3+)     Nitrite, UA Negative     Urobilinogen, UA 0.2 E.U./dL    Narrative:      In absence of clinical symptoms, the presence of pyuria, bacteria, and/or nitrites on the urinalysis result does not correlate with infection.    Blood Culture - Blood, Arm, Right [[id number]] Collected: 03/20/23 1143    Specimen: Blood from Arm, Right Updated: 03/20/23 1149    Blood Gas, Arterial - [[id number]]  (Abnormal) Collected: 03/20/23 1145    Specimen: Arterial Blood Updated: 03/20/23 1149     Site Arterial: right radial     Omar's Test Positive     pH, Arterial 7.425 pH units      pCO2, Arterial 46.6 mm Hg      pO2, Arterial 67.4 mm Hg      HCO3, Arterial 30.6 mmol/L      Base Excess, Arterial 5.6 mmol/L      O2 Saturation Calculated 93.3 %      Comment: Meter: [id number] : tiki Prescott        Barometric Pressure for  Blood Gas 756.9 mmHg      Modality Cannula     Flow Rate 4 lpm      Rate 18 Breaths/minute     Blood Culture - Blood, Arm, Left [[id number]] Collected: 03/20/23 1143    Specimen: Blood from Arm, Left Updated: 03/20/23 1148        Imaging Results (Last 24 Hours)     Procedure Component Value Units Date/Time    CT Chest Without Contrast Diagnostic [[id number]] Collected: 03/20/23 1440     Updated: 03/20/23 1440    Narrative:      CT OF THE CHEST WITHOUT CONTRAST 03/20/2023     HISTORY: Cough. Fever. Abnormal left hemithorax on today's chest  radiograph.     Spiral images were obtained from the lung apices to the upper abdomen.  No intravenous contrast was given.     There is a moderately large left pleural effusion with moderately severe  atelectasis and/or consolidation of the left lower lobe.     Small right pleural effusion is seen with some mild right lower lobe  atelectasis or consolidation.     No pathologically enlarged hilar or mediastinal lymph nodes are seen.  Aorta is not dilated.       Impression:      1. Moderately large left pleural effusion with some left lower lobe  atelectasis and/or consolidation.  2. Small right pleural effusion with some mild right lower lobe  atelectasis and/or consolidation.  3. Short-term follow-up CT of the chest suggested.     Radiation dose reduction techniques were utilized, including automated  exposure control and exposure modulation based on body size.          CT Head Without Contrast [[id number]] Collected: 03/20/23 1352     Updated: 03/20/23 1352    Narrative:      EMERGENCY NONCONTRAST HEAD CT 03/20/2023     CLINICAL HISTORY: Altered mental status.     TECHNIQUE: Spiral CT images were obtained from the base of the skull to  the vertex without intravenous contrast. The images were reformatted and  are submitted in 3 mm thick axial, sagittal and coronal CT sections with  brain algorithm.      This is correlated to a prior noncontrast head CT from [facility]  on 01/13/2023 and a prior MRI of the brain on 04/30/2022 and  head CTs on 04/29/2022 and 04/30/2022 and 05/01/2022.     FINDINGS: Back on 04/30/2022 the patient had a 2.3 x 1.7 cm  intraparenchymal hemorrhage in the superior left frontal white matter.  Now there is a slitlike area of increased density at the site of the  prior hemorrhage measuring 9 x 2 mm in size and likely hemosiderin  deposition at the site of the prior old hemorrhage.  This hyperdense  focus has been present on multiple prior head CTs dating back to  07/27/2022 and is unchanged.. There is some patchy low-density extending  from the periventricular into the subcortical white matter of the  cerebral hemispheres consistent with mild-to-moderate small vessel  disease. The remainder of the brain parenchyma is normal in attenuation.  The ventricles are normal in size. I see no mass effect and no midline  shift and no extra-axial fluid collections are identified. There is no  evidence of acute intracranial hemorrhage. Calvarium and skull base are  normal in appearance.  The paranasal sinuses, mastoid air cells and  middle ear cavities are clear.       Impression:      1. No acute intracranial abnormality is identified with no change when  compared to multiple prior head CTs dating back to 07/27/2022.   2. There is mild-to-moderate small vessel disease in the cerebral white  matter and mild diffuse cerebral atrophy. There is 9 x 2 mm hyperdense  focus of the superior left frontal white matter at the site of a prior  intraparenchymal hemorrhage that occurred back in April 2022 and is felt  to be an area of hemosiderin deposition from an old hemorrhage at this  site and this focus of hyperdensity is unchanged dating back to  07/27/2022. The remainder of the head CT is normal.      Radiation dose reduction techniques were utilized, including automated  exposure control and exposure modulation based on body size.          XR Chest 1 View [[id number]]  Collected: 03/20/23 1204     Updated: 03/20/23 1210    Narrative:      XR CHEST 1 VW-     HISTORY: Female who is 57 years-old,  cough and fever     TECHNIQUE: Frontal view of the chest     COMPARISON: 03/04/2023     FINDINGS: Right-sided central venous catheter appears stable. The heart  is enlarged. Pulmonary vasculature is unremarkable. Increased left sided  atelectasis/infiltrate and pleural effusion. No pneumothorax. No acute  osseous process.       Impression:      Interval worsening with increased left-sided  atelectasis/infiltrate and pleural effusion.     This report was finalized on 3/20/2023 12:07 PM by Dr. Jared Kern M.D.           ECG 12 Lead             Component  Ref Range & Units 11:12  (3/20/23) 1 mo ago  (2/5/23) 1 mo ago  (1/22/23) 1 mo ago  (1/22/23) 2 mo ago  (1/1/23) 2 mo ago  (12/26/22) 3 mo ago  (12/4/22)   QT Interval  ms 415 P  418  391  372  397  514  490    Resulting Agency  ECG  ECG  ECG BH ECG  ECG  ECG BH ECG             HEART RATE= 71  bpm  RR Interval= 845  ms  WI Interval= 160  ms  P Horizontal Axis= 61  deg  P Front Axis= 49  deg  QRSD Interval= 101  ms  QT Interval= 415  ms  QRS Axis= -33  deg  T Wave Axis= 43  deg  - OTHERWISE NORMAL ECG -  Sinus rhythm  Left axis deviation  Baseline wander in lead(s) I,II,III,aVR,aVF,V1,V4,V5             Current Facility-Administered Medications:   •  amLODIPine (NORVASC) tablet 10 mg, 10 mg, Oral, Q24H, Dimitri Pena MD  •  aspirin EC tablet 81 mg, 81 mg, Oral, Daily, Dimitri Pena MD  •  azithromycin (ZITHROMAX) 500 mg in sodium chloride 0.9 % 250 mL IVPB-VTB, 500 mg, Intravenous, Q24H, Dimitri Pena MD  •  carvedilol (COREG) tablet 3.125 mg, 3.125 mg, Oral, BID With Meals, Dimitri Pena MD  •  cefTRIAXone (ROCEPHIN) 1 g in sodium chloride 0.9 % 100 mL IVPB-VTB, 1 g, Intravenous, Q24H, Dimitri Pena MD  •  Desvenlafaxine Succinate ER 25 mg, 25 mg, Oral, Daily, Dimitri Pena MD  •  folic acid (FOLVITE) tablet 1 mg, 1 mg, Oral,  Daily, Terrell Pena MD  •  hydrALAZINE (APRESOLINE) tablet 50 mg, 50 mg, Oral, BID, Terrell Pena MD  •  insulin lispro (ADMELOG) injection 0-7 Units, 0-7 Units, Subcutaneous, TID AC, Terrell Pena MD  •  levETIRAcetam (KEPPRA) tablet 250 mg, 250 mg, Oral, BID, Terrell Pena MD  •  levothyroxine (SYNTHROID, LEVOTHROID) tablet 300 mcg, 300 mcg, Oral, Daily, Terrell Pena MD  •  lisinopril (PRINIVIL,ZESTRIL) tablet 20 mg, 20 mg, Oral, Nightly, Terrell Pena MD  •  melatonin tablet 3 mg, 3 mg, Oral, Nightly PRN, Terrell Pena MD  •  pantoprazole (PROTONIX) EC tablet 40 mg, 40 mg, Oral, Daily, Terrell Pena MD  •  rOPINIRole (REQUIP) tablet 2 mg, 2 mg, Oral, Nightly, Terrell Pena MD  •  sennosides-docusate (PERICOLACE) 8.6-50 MG per tablet 2 tablet, 2 tablet, Oral, Nightly PRN, Terrell Pena MD  •  [COMPLETED] Insert Peripheral IV, , , Once **AND** sodium chloride 0.9 % flush 10 mL, 10 mL, Intravenous, PRN, Andrea Ocampo MD    ASSESSMENT  Left basilar pneumonia  Acute UTI  Metabolic encephalopathy  Worsening anemia  End-stage renal disease on hemodialysis  Right MCA CVA  Diastolic congestive heart failure  Insulin-dependent diabetes mellitus  Hypertension   Hyperlipidemia  Hypothyroidism  Seizure disorder  Gastroesophageal reflux disease    PLAN  Admit  Supplemental oxygen nebulizer  Empiric IV antibiotics after obtaining cultures  Transfuse 1 unit packed RBC  Adjust nursing home medications  Stress ulcer DVT prophylaxis  Nephrology to follow patient for hemodialysis  Infectious disease to follow patient  Supportive care  DNR  PT OT  Discussed with family and nursing staff  Follow closely further recommendation current hospital course    TERRELL PENA MD

## 2023-03-20 NOTE — ED NOTES
Pt arrives via EMS from home for altered mental status. Per pt's  she was not responding to him when he was talking to her. Pt also reports fever and chills with a nonproductive cough. Pt doesn't normally wear oxygen but requires 4L at triage to maintain oxygen saturation. Pt's  was moving her and he heard a pop on the right ribs, now she complains or pain on that side.

## 2023-03-21 ENCOUNTER — APPOINTMENT (OUTPATIENT)
Dept: GENERAL RADIOLOGY | Facility: HOSPITAL | Age: 58
DRG: 871 | End: 2023-03-21
Payer: MEDICARE

## 2023-03-21 LAB
ALBUMIN SERPL-MCNC: 2.4 G/DL (ref 3.5–5.2)
ALBUMIN/GLOB SERPL: 0.7 G/DL
ALP SERPL-CCNC: 133 U/L (ref 39–117)
ALT SERPL W P-5'-P-CCNC: 7 U/L (ref 1–33)
ANION GAP SERPL CALCULATED.3IONS-SCNC: 14.4 MMOL/L (ref 5–15)
AST SERPL-CCNC: 19 U/L (ref 1–32)
BACTERIA SPEC AEROBE CULT: NO GROWTH
BACTERIA UR QL AUTO: ABNORMAL /HPF
BASOPHILS # BLD AUTO: 0.05 10*3/MM3 (ref 0–0.2)
BASOPHILS NFR BLD AUTO: 0.4 % (ref 0–1.5)
BH BB BLOOD EXPIRATION DATE: NORMAL
BH BB BLOOD TYPE BARCODE: 5100
BH BB DISPENSE STATUS: NORMAL
BH BB PRODUCT CODE: NORMAL
BH BB UNIT NUMBER: NORMAL
BILIRUB SERPL-MCNC: 0.4 MG/DL (ref 0–1.2)
BILIRUB UR QL STRIP: NEGATIVE
BUN SERPL-MCNC: 39 MG/DL (ref 6–20)
BUN/CREAT SERPL: 11.8 (ref 7–25)
CALCIUM SPEC-SCNC: 8.3 MG/DL (ref 8.6–10.5)
CHLORIDE SERPL-SCNC: 93 MMOL/L (ref 98–107)
CHOLEST SERPL-MCNC: 92 MG/DL (ref 0–200)
CLARITY UR: ABNORMAL
CO2 SERPL-SCNC: 26.6 MMOL/L (ref 22–29)
COLOR UR: ABNORMAL
CREAT SERPL-MCNC: 3.31 MG/DL (ref 0.57–1)
CROSSMATCH INTERPRETATION: NORMAL
DEPRECATED RDW RBC AUTO: 50.8 FL (ref 37–54)
EGFRCR SERPLBLD CKD-EPI 2021: 15.7 ML/MIN/1.73
EOSINOPHIL # BLD AUTO: 0.11 10*3/MM3 (ref 0–0.4)
EOSINOPHIL NFR BLD AUTO: 0.8 % (ref 0.3–6.2)
ERYTHROCYTE [DISTWIDTH] IN BLOOD BY AUTOMATED COUNT: 16.9 % (ref 12.3–15.4)
GLOBULIN UR ELPH-MCNC: 3.5 GM/DL
GLUCOSE BLDC GLUCOMTR-MCNC: 114 MG/DL (ref 70–130)
GLUCOSE BLDC GLUCOMTR-MCNC: 174 MG/DL (ref 70–130)
GLUCOSE BLDC GLUCOMTR-MCNC: 94 MG/DL (ref 70–130)
GLUCOSE BLDC GLUCOMTR-MCNC: 98 MG/DL (ref 70–130)
GLUCOSE SERPL-MCNC: 88 MG/DL (ref 65–99)
GLUCOSE UR STRIP-MCNC: NEGATIVE MG/DL
HBA1C MFR BLD: 5.6 % (ref 4.8–5.6)
HCT VFR BLD AUTO: 28.4 % (ref 34–46.6)
HDLC SERPL-MCNC: 26 MG/DL (ref 40–60)
HGB BLD-MCNC: 9.2 G/DL (ref 12–15.9)
HGB UR QL STRIP.AUTO: ABNORMAL
HYALINE CASTS UR QL AUTO: ABNORMAL /LPF
IMM GRANULOCYTES # BLD AUTO: 0.11 10*3/MM3 (ref 0–0.05)
IMM GRANULOCYTES NFR BLD AUTO: 0.8 % (ref 0–0.5)
KETONES UR QL STRIP: ABNORMAL
LDLC SERPL CALC-MCNC: 47 MG/DL (ref 0–100)
LDLC/HDLC SERPL: 1.81 {RATIO}
LEUKOCYTE ESTERASE UR QL STRIP.AUTO: ABNORMAL
LYMPHOCYTES # BLD AUTO: 0.8 10*3/MM3 (ref 0.7–3.1)
LYMPHOCYTES NFR BLD AUTO: 5.7 % (ref 19.6–45.3)
MAGNESIUM SERPL-MCNC: 1.8 MG/DL (ref 1.6–2.6)
MCH RBC QN AUTO: 26.7 PG (ref 26.6–33)
MCHC RBC AUTO-ENTMCNC: 32.4 G/DL (ref 31.5–35.7)
MCV RBC AUTO: 82.6 FL (ref 79–97)
MONOCYTES # BLD AUTO: 0.78 10*3/MM3 (ref 0.1–0.9)
MONOCYTES NFR BLD AUTO: 5.6 % (ref 5–12)
NEUTROPHILS NFR BLD AUTO: 12.08 10*3/MM3 (ref 1.7–7)
NEUTROPHILS NFR BLD AUTO: 86.7 % (ref 42.7–76)
NITRITE UR QL STRIP: POSITIVE
NRBC BLD AUTO-RTO: 0 /100 WBC (ref 0–0.2)
PH UR STRIP.AUTO: 6 [PH] (ref 5–8)
PHOSPHATE SERPL-MCNC: 4 MG/DL (ref 2.5–4.5)
PLATELET # BLD AUTO: 389 10*3/MM3 (ref 140–450)
PMV BLD AUTO: 9.6 FL (ref 6–12)
POTASSIUM SERPL-SCNC: 4.3 MMOL/L (ref 3.5–5.2)
PROT SERPL-MCNC: 5.9 G/DL (ref 6–8.5)
PROT UR QL STRIP: ABNORMAL
RBC # BLD AUTO: 3.44 10*6/MM3 (ref 3.77–5.28)
RBC # UR STRIP: ABNORMAL /HPF
REF LAB TEST METHOD: ABNORMAL
SODIUM SERPL-SCNC: 134 MMOL/L (ref 136–145)
SP GR UR STRIP: 1.02 (ref 1–1.03)
SQUAMOUS #/AREA URNS HPF: ABNORMAL /HPF
TRIGL SERPL-MCNC: 95 MG/DL (ref 0–150)
TSH SERPL DL<=0.05 MIU/L-ACNC: 3.5 UIU/ML (ref 0.27–4.2)
UNIT  ABO: NORMAL
UNIT  RH: NORMAL
UROBILINOGEN UR QL STRIP: ABNORMAL
VLDLC SERPL-MCNC: 19 MG/DL (ref 5–40)
WBC # UR STRIP: ABNORMAL /HPF
WBC NRBC COR # BLD: 13.93 10*3/MM3 (ref 3.4–10.8)

## 2023-03-21 PROCEDURE — 83735 ASSAY OF MAGNESIUM: CPT | Performed by: INTERNAL MEDICINE

## 2023-03-21 PROCEDURE — 71110 X-RAY EXAM RIBS BIL 3 VIEWS: CPT

## 2023-03-21 PROCEDURE — 84100 ASSAY OF PHOSPHORUS: CPT | Performed by: INTERNAL MEDICINE

## 2023-03-21 PROCEDURE — 80053 COMPREHEN METABOLIC PANEL: CPT | Performed by: HOSPITALIST

## 2023-03-21 PROCEDURE — 85025 COMPLETE CBC W/AUTO DIFF WBC: CPT | Performed by: HOSPITALIST

## 2023-03-21 PROCEDURE — 82962 GLUCOSE BLOOD TEST: CPT

## 2023-03-21 PROCEDURE — 87086 URINE CULTURE/COLONY COUNT: CPT | Performed by: INTERNAL MEDICINE

## 2023-03-21 PROCEDURE — 83036 HEMOGLOBIN GLYCOSYLATED A1C: CPT | Performed by: HOSPITALIST

## 2023-03-21 PROCEDURE — 80061 LIPID PANEL: CPT | Performed by: HOSPITALIST

## 2023-03-21 PROCEDURE — 87186 SC STD MICRODIL/AGAR DIL: CPT | Performed by: INTERNAL MEDICINE

## 2023-03-21 PROCEDURE — 25010000002 HEPARIN (PORCINE) PER 1000 UNITS: Performed by: INTERNAL MEDICINE

## 2023-03-21 PROCEDURE — 5A1D70Z PERFORMANCE OF URINARY FILTRATION, INTERMITTENT, LESS THAN 6 HOURS PER DAY: ICD-10-PCS | Performed by: INTERNAL MEDICINE

## 2023-03-21 PROCEDURE — 25010000002 AZITHROMYCIN PER 500 MG: Performed by: HOSPITALIST

## 2023-03-21 PROCEDURE — 84443 ASSAY THYROID STIM HORMONE: CPT | Performed by: HOSPITALIST

## 2023-03-21 PROCEDURE — 81001 URINALYSIS AUTO W/SCOPE: CPT | Performed by: INTERNAL MEDICINE

## 2023-03-21 PROCEDURE — 25010000002 CEFTRIAXONE PER 250 MG: Performed by: HOSPITALIST

## 2023-03-21 PROCEDURE — 87077 CULTURE AEROBIC IDENTIFY: CPT | Performed by: INTERNAL MEDICINE

## 2023-03-21 RX ORDER — HYDROCODONE BITARTRATE AND ACETAMINOPHEN 5; 325 MG/1; MG/1
1 TABLET ORAL EVERY 4 HOURS PRN
Status: DISCONTINUED | OUTPATIENT
Start: 2023-03-21 | End: 2023-03-28

## 2023-03-21 RX ORDER — HEPARIN SODIUM 1000 [USP'U]/ML
4000 INJECTION, SOLUTION INTRAVENOUS; SUBCUTANEOUS AS NEEDED
Status: DISCONTINUED | OUTPATIENT
Start: 2023-03-21 | End: 2023-03-28

## 2023-03-21 RX ADMIN — HYDROCODONE BITARTRATE AND ACETAMINOPHEN 1 TABLET: 5; 325 TABLET ORAL at 13:09

## 2023-03-21 RX ADMIN — CARVEDILOL 3.12 MG: 3.12 TABLET, FILM COATED ORAL at 17:42

## 2023-03-21 RX ADMIN — LEVETIRACETAM 250 MG: 250 TABLET, FILM COATED ORAL at 23:01

## 2023-03-21 RX ADMIN — CEFTRIAXONE SODIUM 1 G: 1 INJECTION, POWDER, FOR SOLUTION INTRAMUSCULAR; INTRAVENOUS at 16:29

## 2023-03-21 RX ADMIN — LEVOTHYROXINE SODIUM 300 MCG: 0.15 TABLET ORAL at 12:57

## 2023-03-21 RX ADMIN — HYDROCODONE BITARTRATE AND ACETAMINOPHEN 1 TABLET: 5; 325 TABLET ORAL at 17:49

## 2023-03-21 RX ADMIN — AZITHROMYCIN DIHYDRATE 500 MG: 500 INJECTION, POWDER, LYOPHILIZED, FOR SOLUTION INTRAVENOUS at 16:29

## 2023-03-21 RX ADMIN — ROPINIROLE 2 MG: 2 TABLET, FILM COATED ORAL at 21:01

## 2023-03-21 RX ADMIN — HEPARIN SODIUM 4000 UNITS: 1000 INJECTION INTRAVENOUS; SUBCUTANEOUS at 10:25

## 2023-03-21 RX ADMIN — ASPIRIN 81 MG: 81 TABLET, COATED ORAL at 12:57

## 2023-03-21 RX ADMIN — DESVENLAFAXINE SUCCINATE 25 MG: 25 TABLET, EXTENDED RELEASE ORAL at 12:57

## 2023-03-21 RX ADMIN — Medication 1 MG: at 12:57

## 2023-03-21 RX ADMIN — AMLODIPINE BESYLATE 10 MG: 10 TABLET ORAL at 12:57

## 2023-03-21 RX ADMIN — PANTOPRAZOLE SODIUM 40 MG: 40 TABLET, DELAYED RELEASE ORAL at 12:57

## 2023-03-21 RX ADMIN — HYDROCODONE BITARTRATE AND ACETAMINOPHEN 1 TABLET: 5; 325 TABLET ORAL at 23:01

## 2023-03-21 RX ADMIN — LEVETIRACETAM 250 MG: 250 TABLET, FILM COATED ORAL at 12:57

## 2023-03-21 NOTE — PROGRESS NOTES
[facility] Progress Note      Patient Name: Zaina Martinez  : 1965  MRN: [id number]  Primary Care Physician:  Norman Serrato MD  Date of admission: 3/20/2023    Subjective     Interval History:   Follow up ESRD.  Feels bad. Ate a little breakfast. Complains of bilateral rib pain.     Review of Systems:   As noted above    Objective     Vitals:   Temp:  [97.4 °F (36.3 °C)-98.9 °F (37.2 °C)] 98.1 °F (36.7 °C)  Heart Rate:  [62-73] 72  Resp:  [16-18] 16  BP: (129-154)/(64-83) 154/69  Flow (L/min):  [3] 3    Intake/Output Summary (Last 24 hours) at 3/21/2023 1218  Last data filed at 3/21/2023 1200  Gross per 24 hour   Intake 907.08 ml   Output 2000 ml   Net -1092.92 ml       Physical Exam:    General Appearance: Very Chronically ill and frail.  On dialysis  .  Qb 400.  systolic. Goal 2.1 kg  Skin: warm and dry. pale  HEENT:oral mucosa moist.   Neck:RIJ TDC. Tunnel non-tender.   Lungs: decreased bs left lower lobe.   Heart: RRR, no s3 or rub  Abdomen: soft, nontender, nondistended, very active bs  Extremities: no edema.  Neuro: flat affect. . Moves all 4 ext.    Scheduled Meds:     amLODIPine, 10 mg, Oral, Q24H  aspirin, 81 mg, Oral, Daily  azithromycin, 500 mg, Intravenous, Q24H  carvedilol, 3.125 mg, Oral, BID With Meals  cefTRIAXone, 1 g, Intravenous, Q24H  Desvenlafaxine Succinate ER, 25 mg, Oral, Daily  folic acid, 1 mg, Oral, Daily  insulin lispro, 0-7 Units, Subcutaneous, 4x Daily With Meals & Nightly  levETIRAcetam, 250 mg, Oral, BID  levothyroxine, 300 mcg, Oral, Daily  pantoprazole, 40 mg, Oral, Daily  rOPINIRole, 2 mg, Oral, Nightly      IV Meds:        Results Reviewed:   I have personally reviewed the results from the time of this admission to 3/21/2023 12:18 EDT     Results from last 7 days   Lab Units 03/21/23  0327 23  1203   SODIUM mmol/L 134* 133*   POTASSIUM mmol/L 4.3 4.2   CHLORIDE mmol/L 93* 95*   CO2 mmol/L 26.6 25.7   BUN mg/dL 39* 33*   CREATININE  mg/dL 3.31* 3.11*   CALCIUM mg/dL 8.3* 8.2*   BILIRUBIN mg/dL 0.4 0.3   ALK PHOS U/L 133* 137*   ALT (SGPT) U/L 7 <5   AST (SGOT) U/L 19 13   GLUCOSE mg/dL 88 214*       Estimated Creatinine Clearance: 16.5 mL/min (A) (by C-G formula based on SCr of 3.31 mg/dL (H)).    Results from last 7 days   Lab Units 03/21/23  0327   MAGNESIUM mg/dL 1.8   PHOSPHORUS mg/dL 4.0             Results from last 7 days   Lab Units 03/21/23  0327 03/20/23  1203   WBC 10*3/mm3 13.93* 13.57*   HEMOGLOBIN g/dL 9.2* 7.0*   PLATELETS 10*3/mm3 389 353             Assessment / Plan     ASSESSMENT:  1. ESRD. Dialysis today.   2. Sepsis with multiple sources, PNA , moderate left and small right pleural effusions, TDC, UTI .   3. Chronic diastolic  heart failure.  4. HTN controlled.   5. Anemia ckd and blood loss. 1 unit  PRBC 3/20. .  6. Left renal hematoma after left ureteral stent 12/30/22.  Left renal artery embolization 1/1/23.  Left stent removal 1/10/23.   7. Possible rib fractures. Check Xray.  8. Hypothyroid on replacement.   9. Labile DM2    PLAN:  1. Dialysis today  2. Rib films      Ruth Lira MD  03/21/23  12:18 EDT    [facility]  [phone]

## 2023-03-21 NOTE — CASE MANAGEMENT/SOCIAL WORK
Discharge Planning Assessment  [facility]     Patient Name: Zaina Martinez  MRN: [id number]  Today's Date: 3/21/2023    Admit Date: 3/20/2023    Plan: Return home with family assist   Discharge Needs Assessment     Row Name 03/21/23 1503       Living Environment    People in Home spouse    Current Living Arrangements home    Primary Care Provided by spouse/significant other    Provides Primary Care For no one, unable/limited ability to care for self    Family Caregiver if Needed spouse;child(pro), adult    Quality of Family Relationships unable to assess    Able to Return to Prior Arrangements yes       Resource/Environmental Concerns    Resource/Environmental Concerns none    Transportation Concerns none       Transition Planning    Patient/Family Anticipates Transition to home with family    Patient/Family Anticipated Services at Transition none;durable medical equipment       Discharge Needs Assessment    Equipment Currently Used at Home wheelchair;hospital bed;shower chair;commode;oxygen    Concerns to be Addressed discharge planning    Equipment Needed After Discharge lift device    Outpatient/Agency/Support Group Needs skilled nursing facility    Discharge Facility/Level of Care Needs nursing facility, skilled    Provided Post Acute Provider List? Refused               Discharge Plan     Row Name 03/21/23 1527       Plan    Plan Return home with family assist    Patient/Family in Agreement with Plan yes    Plan Comments CCP met with luisito at bedside. Introduced self and explained role. Patient lives with her , Marv, and 2 grandchildren. There is a ramp to enter their home and her bedroom/bathroom is on the first floor. She is dependent on Marv for all ADLs and transportation. She goes to  at [facility] and her spouse transports her. Patient has no PCP. Call placed to extended care house calls to make referral, but Tresa/JANES unable to accept back due to non-compliance. Call to advanced care  house calls who states they planned to see patient in February but were unable to and Yulissa/[facility] states CCP can send new referral to agency. VMM left with spouse/Marv (with patient's consent) requesting callback to inquire if they prefer [facility] or in-person PCP appointment. For DME patient has oxygen (unsure of provider- VMM left with Georgi/Vincent's to inquire), wheelchair, hospital bed, shower chair, and a bedside commode. CCP discussed option of mechanical lift as she states her  is currently lifting her to perform transfers and she is agreeable to CCP discussing with her . She has used HH in the past and been to [facility], [facility], [facility], [facility], and [facility] for rehab in the past. Patient states she is in her copay days for SNF at this time and does not plan to return. CCP discussed option of LTC medicaid-pending and patient declines and states she plans to return home. She states her daughter Fiona can come over and assist her during the day while her  is at work. Patient denies further discharge planning needs at this time. Jolene CAMACHO RN/CCP              Continued Care and Services - Admitted Since 3/20/2023     Destination     Service Provider Request Status Selected Services Address Phone Fax Patient Preferred    [facility] Pending - Request Sent N/A [address] [facility] [address] [phone] --            Selected Continued Care - Prior Encounters Includes continued care and service providers with selected services from prior encounters from 12/20/2022 to 3/21/2023    Discharged on 3/11/2023 Admission date: 3/4/2023 - Discharge disposition: Rehab Facility or Unit (DC - External)    Destination     Service Provider Selected Services Address Phone Fax Patient Preferred    [facility] [address] [facility] [address] [phone] --                Discharged on  2/11/2023 Admission date: 2/1/2023 - Discharge disposition: Skilled Nursing Facility (DC - External)    Destination     Service Provider Selected Services Address Phone Fax Patient Preferred    [facility] [address] [facility] [address] [phone] --                Discharged on 1/29/2023 Admission date: 12/26/2022 - Discharge disposition: Home-Health Care Svc    Home Medical Care     Service Provider Selected Services Address Phone Fax Patient Preferred    [facility] [address] [facility] [address] [facility] [id number] [phone] --                    Expected Discharge Date and Time     Expected Discharge Date Expected Discharge Time    Mar 22, 2023          Demographic Summary     Row Name 03/21/23 1454       General Information    Admission Type inpatient    Arrived From home    Required Notices Provided Important Message from Medicare    Referral Source admission list    Reason for Consult discharge planning    Preferred Language English       Contact Information    Permission Granted to Share Info With family/designee               Functional Status     Row Name 03/21/23 1503       Functional Status    Usual Activity Tolerance poor    Current Activity Tolerance poor       Functional Status, IADL    Medications completely dependent    Meal Preparation completely dependent    Housekeeping completely dependent    Laundry completely dependent    Shopping completely dependent               Psychosocial    No documentation.                Abuse/Neglect    No documentation.                Legal    No documentation.                Substance Abuse    No documentation.                Patient Forms    No documentation.                   Tesha Brooks

## 2023-03-21 NOTE — DISCHARGE PLACEMENT REQUEST
"Zaina Martinez (57 y.o. Female)     Date of Birth   1965    Social Security Number       Address   [address] [place] [address]    Home Phone   [phone]    MRN   [id number]       Mosque   None    Marital Status                               Admission Date   3/20/23    Admission Type   Emergency    Admitting Provider   Dimitri Pena MD    Attending Provider   Dimitri Pena MD    Department, Room/Bed   [address], [address]/1       Discharge Date       Discharge Disposition       Discharge Destination                               Attending Provider: Dimitri Pena MD    Allergies: Contrast Dye (Echo Or Unknown Ct/mr), Iodinated Contrast Media, Ibuprofen, Prochlorperazine    Isolation: Contact   Infection: MRSA (03/21/23)   Code Status: No CPR    Ht: 157.5 cm (62\")   Wt: 64.3 kg (141 lb 12.8 oz)    Admission Cmt: None   Principal Problem: Urinary tract infection without hematuria, site unspecified [N39.0]                 Active Insurance as of 3/20/2023     Primary Coverage     Payor Plan Insurance Group Employer/Plan Group    ANTH MEDICARE REPLACEMENT ANTHEM MEDICARE ADVANTAGE [id number]     Payor Plan Address Payor Plan Phone Number Payor Plan Fax Number Effective Dates    [address] [phone]  1/1/2019 - None Entered    [facility] [address]       Subscriber Name Subscriber Birth Date Member ID       ZAINA MARTINEZ 1965 [id number]                 Emergency Contacts      (Rel.) Home Phone Work Phone Mobile Phone    Fiona Martinez (Daughter) [phone] -- [phone]    Marv Martinez (Spouse) -- -- [phone]            "

## 2023-03-21 NOTE — CONSULTS
CONSULT NOTE    Infectious Diseases - Debbie Chester MD  [facility]       Patient Identification:  Name: Zaina Martinez  Age: 57 y.o.  Sex: female  :  1965  MRN: [id number]             Date of Consultation: 3/20/2023      Primary Care Physician: Norman Serrato MD                               Requesting Physician: Dr. Pena  Reason for Consultation: Sepsis    Impression: Patient is a 57-year-old female who has complicated past medical history including history of end-stage renal disease on hemodialysis, history of insulin-dependent diabetes, congestive heart failure and prior history of CVA who was recently hospitalized earlier this month for hypoglycemia.  Patient had a prolonged hospitalization and was discharged on 3/11/2023 to the rehabilitation facility.  Patient just was released from the rehabilitation facility to her home 4 days ago.  In this background patient was brought in earlier today with altered mental status and associated fever chills and nonproductive cough.  Patient was hypoxic requiring oxygen supplementation and was noted to have discomfort in the right ribs area.  Work-up in the emergency room revealed abnormal urinalysis significant of which is unclear given her end-stage renal disease and imaging studies revealed left-sided lung infiltrate.  She was noted to be anemic.  Respiratory viral panel is negative and blood cultures are drawn so far pending.  Patient has been started on ceftriaxone and Zithromax and is being admitted for further care.  This presentation in the above context is consistent with:  1-systemic sepsis due to:   -pneumonia bilateral with moderate left-sided pleural effusion and mild right-sided pleural effusion with risk of complications of pneumonia such as evolving empyema   -urinary tract infection in the setting of end-stage renal disease and minimal urinary output   -possible line sepsis due to infected hemodialysis catheter  2-end-stage renal  disease on hemodialysis  3-reactive diabetes  4-anemia  5-rheumatoid arthritis  6-other diagnoses per primary team.    Recommendations/Discussions:  At this juncture I agree with the care plan consisting of empiric antibiotic therapy consisting of ceftriaxone and Zithromax while following up on blood cultures and urinalysis and urine culture.  Supportive care for anemia and end-stage renal disease per primary team and nephrology service.  Thank you Dr. Mari for letting me be the part of your patient care please see above impression and recommendations    History of Present Illness:   Patient is a 57-year-old female who has complicated past medical history including history of end-stage renal disease on hemodialysis, history of insulin-dependent diabetes, congestive heart failure and prior history of CVA who was recently hospitalized earlier this month for hypoglycemia.  Patient had a prolonged hospitalization and was discharged on 3/11/2023 to the rehabilitation facility.  Patient just was released from the rehabilitation facility to her home 4 days ago.  In this background patient was brought in earlier today with altered mental status and associated fever chills and nonproductive cough.  Patient was hypoxic requiring oxygen supplementation and was noted to have discomfort in the right ribs area.  Work-up in the emergency room revealed abnormal urinalysis significant of which is unclear given her end-stage renal disease and imaging studies revealed left-sided lung infiltrate.  She was noted to be anemic.  Respiratory viral panel is negative and blood cultures are drawn so far pending.  Patient has been started on ceftriaxone and Zithromax and is being admitted for further care.      Past Medical History:  Past Medical History:   Diagnosis Date   • Acute CVA (cerebrovascular accident) (Formerly [facility]) 05/01/2022   • Acute on chronic diastolic CHF (congestive heart failure) (Formerly [facility])    • Anemia    • CAD (coronary artery disease)  12/20/2021   • Diabetes (HCC)    • Disease of thyroid gland    • GERD (gastroesophageal reflux disease)    • History of intracranial hemorrhage 5/1/2022   • Hyperlipidemia 11/30/2018   • Hypertension    • Rheumatoid arthritis (HCC)    • Stage 5 chronic kidney disease (HCC)      Past Surgical History:  Past Surgical History:   Procedure Laterality Date   • CHOLECYSTECTOMY WITH INTRAOPERATIVE CHOLANGIOGRAM N/A 1/10/2022    Procedure: Laparoscopic cholecystectomy with intraoperative cholangiogram;  Surgeon: Ramana Raygoza MD;  Location: [facility];  Service: General;  Laterality: N/A;   • CYSTOSCOPY W/ URETERAL STENT PLACEMENT Left 9/29/2022    Procedure: CYSTOSCOPY URETERAL STENT INSERTION WITH RETROGRADE PYELOGRAM;  Surgeon: Bryant Phan Jr., MD;  Location: [facility];  Service: Urology;  Laterality: Left;   • CYSTOSCOPY W/ URETERAL STENT PLACEMENT Left 1/10/2023    Procedure: CYSTOSCOPY LEFT STENT REMOVAL;  Surgeon: Bryant Phan Jr., MD;  Location: [facility];  Service: Urology;  Laterality: Left;   • EMBOLIZATION MESENTERIC ARTERY N/A 1/1/2023    Procedure: LEFT KIDNEY EMBOLIZATION;  Surgeon: Bijan Ordoñez MD;  Location: [facility] OR 18/19;  Service: Interventional Radiology;  Laterality: N/A;   • EYE SURGERY     • FEMUR IM NAILING/RODDING Right 11/10/2022    Procedure: FEMUR INTRAMEDULLARY NAILING/RODDING;  Surgeon: Glen Pierce MD;  Location: [facility] OR;  Service: Orthopedics;  Laterality: Right;   • HIP INTERTROCHANTERIC NAILING Right 11/10/2022    Procedure: HIP INTERTROCHANTERIC NAILING;  Surgeon: Glen Pierce MD;  Location: [facility] OR;  Service: Orthopedics;  Laterality: Right;   • HYSTERECTOMY     • INSERT CENTRAL LINE AT BEDSIDE  12/31/2022        • INSERTION HEMODIALYSIS CATHETER N/A 12/6/2021    Procedure: HEMODIALYSIS CATHETER INSERTION;  Surgeon: Keli Salazar MD;  Location: [facility] OR 18/19;  Service: Vascular;   Laterality: N/A;   • INSERTION HEMODIALYSIS CATHETER N/A 5/3/2022    Procedure: TUNNELED CATHETER PLACEMENT;  Surgeon: Keli Salazar MD;  Location: [facility] OR;  Service: Vascular;  Laterality: N/A;   • MUSCLE BIOPSY Left 6/15/2022    Procedure: Left quadriceps muscle biopsy;  Surgeon: Marques Ma MD;  Location: [facility] OR;  Service: Neurosurgery;  Laterality: Left;   • URETEROSCOPY LASER LITHOTRIPSY WITH STENT INSERTION Left 12/30/2022    Procedure: CYSTOSCOPY WITH LEFT  URETEROSCOPY  LEFT STENT EXCHANGE;  Surgeon: Bryant Phan Jr., MD;  Location: [facility] OR;  Service: Urology;  Laterality: Left;      Home Meds:  Medications Prior to Admission   Medication Sig Dispense Refill Last Dose   • acetaminophen (TYLENOL) 325 MG tablet Take 2 tablets by mouth Every 6 (Six) Hours As Needed for Mild Pain, Fever or Headache.   Past Week   • amLODIPine (NORVASC) 10 MG tablet Take 1 tablet by mouth Daily. 30 tablet 0 Past Week   • aspirin 81 MG EC tablet Take 1 tablet by mouth Daily. 30 tablet 0 Past Week   • carvedilol (COREG) 3.125 MG tablet Take 1 tablet by mouth 2 (Two) Times a Day With Meals. 60 tablet 0 Past Week   • Desvenlafaxine Succinate ER 25 MG tablet sustained-release 24 hour Take 1 tablet by mouth Daily. 30 tablet 0 Past Week   • folic acid (FOLVITE) 1 MG tablet Take 1 tablet by mouth Daily.   Past Week   • levothyroxine (Synthroid) 150 MCG tablet Take 2 tablets by mouth Daily. 60 tablet 0 Past Week   • lisinopril (PRINIVIL,ZESTRIL) 20 MG tablet Take 1 tablet by mouth Every Night.   Past Week   • melatonin 3 MG tablet Take 1 tablet by mouth At Night As Needed for Sleep.   Past Week   • pantoprazole (PROTONIX) 40 MG EC tablet Take 1 tablet by mouth Daily. 90 tablet 0 Past Week   • sennosides-docusate (PERICOLACE) 8.6-50 MG per tablet Take 2 tablets by mouth At Night As Needed for Constipation.   Past Week   • vitamin D (ERGOCALCIFEROL) 1.25 MG (51713 UT) capsule capsule Take 1 capsule by  mouth Every 7 (Seven) Days. 5 capsule  Past Week   • glucagon (GLUCAGEN) 1 MG injection Inject 1 mg into the appropriate muscle as directed by prescriber.   Unknown   • hydrALAZINE (APRESOLINE) 50 MG tablet Take 1 tablet by mouth 2 (Two) Times a Day.   Unknown   • insulin lispro (ADMELOG) 100 UNIT/ML injection Inject 0-7 Units under the skin into the appropriate area as directed 3 (Three) Times a Day Before Meals.   Unknown   • lactulose (CHRONULAC) 10 GM/15ML solution Take 15 mL by mouth 2 (Two) Times a Day As Needed.   Unknown   • levETIRAcetam (Keppra) 250 MG tablet Take 1 tablet by mouth 2 (Two) Times a Day. 60 tablet 0 Unknown   • lidocaine (LIDODERM) 5 % Place 1 patch on the skin as directed by provider Daily. Remove & Discard patch within 12 hours or as directed by MD   Unknown   • Methoxy PEG-Epoetin Beta (MIRCERA IJ) 100 mcg Every 14 (Fourteen) Days.      • ondansetron ODT (ZOFRAN-ODT) 4 MG disintegrating tablet Place 1 tablet on the tongue Every 8 (Eight) Hours As Needed for Nausea or Vomiting.   Unknown   • rOPINIRole (REQUIP) 2 MG tablet Take 1 tablet by mouth Every Night. 30 tablet 0 Unknown     Current Meds:     Current Facility-Administered Medications:   •  acetaminophen (TYLENOL) tablet 650 mg, 650 mg, Oral, Q6H PRN, Dimitri Pena MD, 650 mg at 03/20/23 1757  •  amLODIPine (NORVASC) tablet 10 mg, 10 mg, Oral, Q24H, Dimitri Pena MD, 10 mg at 03/20/23 1730  •  aspirin EC tablet 81 mg, 81 mg, Oral, Daily, Dimitri Pena MD, 81 mg at 03/20/23 1730  •  azithromycin (ZITHROMAX) 500 mg in sodium chloride 0.9 % 250 mL IVPB-VTB, 500 mg, Intravenous, Q24H, Dimitri Pena MD, 500 mg at 03/20/23 1800  •  carvedilol (COREG) tablet 3.125 mg, 3.125 mg, Oral, BID With Meals, Dimitri Pena MD, 3.125 mg at 03/20/23 1730  •  cefTRIAXone (ROCEPHIN) 1 g in sodium chloride 0.9 % 100 mL IVPB-VTB, 1 g, Intravenous, Q24H, Dimitri Pena MD, Last Rate: 200 mL/hr at 03/20/23 1730, 1 g at 03/20/23 1730  •  Desvenlafaxine  Succinate ER 25 mg, 25 mg, Oral, Daily, Dimitri Pena MD, 25 mg at 03/20/23 1730  •  folic acid (FOLVITE) tablet 1 mg, 1 mg, Oral, Daily, Dimitri Pena MD, 1 mg at 03/20/23 1730  •  insulin lispro (ADMELOG) injection 0-7 Units, 0-7 Units, Subcutaneous, 4x Daily With Meals & Nightly, Dimitri Pena MD  •  levETIRAcetam (KEPPRA) tablet 250 mg, 250 mg, Oral, BID, Dimitri Pena MD  •  levothyroxine (SYNTHROID, LEVOTHROID) tablet 300 mcg, 300 mcg, Oral, Daily, Dimitri Pena MD, 300 mcg at 03/20/23 1729  •  melatonin tablet 3 mg, 3 mg, Oral, Nightly PRN, Dimitri Pena MD  •  pantoprazole (PROTONIX) EC tablet 40 mg, 40 mg, Oral, Daily, Dimitri Pena MD, 40 mg at 03/20/23 1730  •  rOPINIRole (REQUIP) tablet 2 mg, 2 mg, Oral, Nightly, Dimitri Pena MD  •  sennosides-docusate (PERICOLACE) 8.6-50 MG per tablet 2 tablet, 2 tablet, Oral, Nightly PRN, Dimitri Pena MD  •  [COMPLETED] Insert Peripheral IV, , , Once **AND** sodium chloride 0.9 % flush 10 mL, 10 mL, Intravenous, PRN, Andrea Ocampo MD  •  sodium phosphates 10 mmol in sodium chloride 0.9 % 250 mL IVPB, 10 mmol, Intravenous, Once, Alejo Lange MD  Allergies:  Allergies   Allergen Reactions   • Contrast Dye (Echo Or Unknown Ct/Mr) Confusion   • Iodinated Contrast Media Unknown - High Severity   • Ibuprofen Other (See Comments)     Kidney disease   • Prochlorperazine Other (See Comments)     Dystonic reaction          Social History:   Social History     Tobacco Use   • Smoking status: Never   • Smokeless tobacco: Never   Substance Use Topics   • Alcohol use: Never      Family History:  Family History   Problem Relation Age of Onset   • Malig Hyperthermia Neg Hx           Review of Systems  See history of present illness and past medical history.   Unable to perform ROS: Mental status change   Respiratory: Positive for cough.    Neurological: Positive for weakness.   Psychiatric/Behavioral: Positive for confusion.       Vitals:   /66 (BP Location:  "Right arm, Patient Position: Lying)   Pulse 63   Temp 97.8 °F (36.6 °C) (Oral)   Resp 16   Ht 157.5 cm (62\")   Wt 64.3 kg (141 lb 12.8 oz)   SpO2 96%   BMI 25.94 kg/m²   I/O:     Intake/Output Summary (Last 24 hours) at 3/20/2023 2046  Last data filed at 3/20/2023 1556  Gross per 24 hour   Intake 220 ml   Output --   Net 220 ml     Exam:  Patient is examined using the personal protective equipment as per guidelines from infection control for this particular patient as enacted.  Hand washing was performed before and after patient interaction.  General Appearance:   Emaciated ill-appearing female who appears chronically ill   Head:    Normocephalic, without obvious abnormality, atraumatic   Eyes:    PERRL, conjunctivae/corneas clear, EOM's intact, both eyes   Ears:    Normal external ear canals, both ears   Nose:   Nares normal, septum midline, mucosa normal, no drainage    or sinus tenderness   Throat:   Lips, tongue, gums normal; oral mucosa pink and moist   Neck:   Supple, symmetrical, trachea midline, no adenopathy;     thyroid:  no enlargement/tenderness/nodules; no carotid    bruit or JVD   Back:     Symmetric, no curvature, ROM normal, no CVA tenderness   Lungs:    Decreased breath sounds at the bases   Chest Wall:    No tenderness or deformity, right IJ tunneled catheter in place    Heart:   S1-S2 regular   Abdomen:    Soft no guarding rigidity or rebound noted   Extremities:  Bilateral trace edema   Pulses:   Pulses palpable in all extremities; symmetric all extremities   Skin:  Chronic skin changes noted   Neurologic:  Withdrawn and does not want to engage.       Data Review:    I reviewed the patient's new clinical results.  Results from last 7 days   Lab Units 03/20/23  1203   WBC 10*3/mm3 13.57*   HEMOGLOBIN g/dL 7.0*   PLATELETS 10*3/mm3 353     Results from last 7 days   Lab Units 03/20/23  1203   SODIUM mmol/L 133*   POTASSIUM mmol/L 4.2   CHLORIDE mmol/L 95*   CO2 mmol/L 25.7   BUN mg/dL 33* "   CREATININE mg/dL 3.11*   CALCIUM mg/dL 8.2*   GLUCOSE mg/dL 214*     Microbiology Results (last 10 days)     Procedure Component Value - Date/Time    Respiratory Panel PCR w/COVID-19(SARS-CoV-2) [facility] In-House, NP Swab in UTM/VTM, 3-4 HR TAT - Swab, Nasopharynx [[id number]]  (Normal) Collected: 03/20/23 1122    Lab Status: Final result Specimen: Swab from Nasopharynx Updated: 03/20/23 1479     ADENOVIRUS, PCR Not Detected     Coronavirus 229E Not Detected     Coronavirus HKU1 Not Detected     Coronavirus NL63 Not Detected     Coronavirus OC43 Not Detected     COVID19 Not Detected     Human Metapneumovirus Not Detected     Human Rhinovirus/Enterovirus Not Detected     Influenza A PCR Not Detected     Influenza B PCR Not Detected     Parainfluenza Virus 1 Not Detected     Parainfluenza Virus 2 Not Detected     Parainfluenza Virus 3 Not Detected     Parainfluenza Virus 4 Not Detected     RSV, PCR Not Detected     Bordetella pertussis pcr Not Detected     Bordetella parapertussis PCR Not Detected     Chlamydophila pneumoniae PCR Not Detected     Mycoplasma pneumo by PCR Not Detected    Narrative:      In the setting of a positive respiratory panel with a viral infection PLUS a negative procalcitonin without other underlying concern for bacterial infection, consider observing off antibiotics or discontinuation of antibiotics and continue supportive care. If the respiratory panel is positive for atypical bacterial infection (Bordetella pertussis, Chlamydophila pneumoniae, or Mycoplasma pneumoniae), consider antibiotic de-escalation to target atypical bacterial infection.        CT Head Without Contrast    Result Date: 3/20/2023  1. No acute intracranial abnormality is identified with no change when compared to multiple prior head CTs dating back to 07/27/2022. 2. There is mild-to-moderate small vessel disease in the cerebral white matter and mild diffuse cerebral atrophy. There is 9 x 2 mm  hyperdense focus of the superior left frontal white matter at the site of a prior intraparenchymal hemorrhage that occurred back in April 2022 and is felt to be an area of hemosiderin deposition from an old hemorrhage at this site and this focus of hyperdensity is unchanged dating back to 07/27/2022. The remainder of the head CT is normal.  Radiation dose reduction techniques were utilized, including automated exposure control and exposure modulation based on body size.  This report was finalized on 3/20/2023 5:37 PM by Dr. Suhas Sanches M.D.      CT Chest Without Contrast Diagnostic    Result Date: 3/20/2023  1. Moderately large left pleural effusion with some left lower lobe atelectasis and/or consolidation. 2. Small right pleural effusion with some mild right lower lobe atelectasis and/or consolidation. 3. Short-term follow-up CT of the chest suggested.  Radiation dose reduction techniques were utilized, including automated exposure control and exposure modulation based on body size.  This report was finalized on 3/20/2023 4:07 PM by Dr. Ran Wall M.D.      XR Chest 1 View    Result Date: 3/20/2023  Interval worsening with increased left-sided atelectasis/infiltrate and pleural effusion.  This report was finalized on 3/20/2023 12:07 PM by Dr. Jared Kern M.D.          Assessment:  Active Hospital Problems    Diagnosis  POA   • **Urinary tract infection without hematuria, site unspecified [N39.0]  Yes      Resolved Hospital Problems   No resolved problems to display.         Plan:  See above  Debbie Quick MD   3/20/2023  20:46 EDT    Parts of this note may be an electronic transcription/translation of spoken language to printed text using the Dragon dictation system.

## 2023-03-21 NOTE — SIGNIFICANT NOTE
03/21/23 1439   OTHER   Discipline physical therapist   Rehab Time/Intention   Session Not Performed patient/family declined evaluation  (Patient declined PT and OT evaluation despite max encouragement. Notified patient and AMY Thakkar that PT will follow up tomorrow.)   Recommendation   PT - Next Appointment 03/22/23

## 2023-03-21 NOTE — NURSING NOTE
Dialysis completed as prescribed. Stable run. drsg changed. Wnl. 2 liters net uf. Post vss 179/79 hr 79

## 2023-03-21 NOTE — PROGRESS NOTES
"Daily progress note    Primary care physician      Chief complaint  Doing same with no new complaint but still weak    History of present illness  57-year-old white female with very complex past medical history including end-stage renal disease on hemodialysis insulin-dependent diabetes mellitus congestive heart failure hypothyroidism hypertension right MCA stroke and chronic anemia presented to [facility], [place], operated by [facility] with altered mental status fever cough and generalized weakness.  Patient evaluated in ER found to have left basilar pneumonia and acute UTI with metabolic encephalopathy admit for management.  Patient able to answer all questions but most of the history obtained from the  old record nursing staff.  Patient has no chest pain increase shortness of breath palpitation nausea vomiting diarrhea.     REVIEW OF SYSTEMS  Per history of present illness    PHYSICAL EXAM  Blood pressure 177/76, pulse 82, temperature 98.3 °F (36.8 °C), temperature source Oral, resp. rate 16, height 157.5 cm (62\"), weight 64.3 kg (141 lb 12.8 oz), SpO2 99 %, not currently breastfeeding.    GENERAL:  Awake and alert ill-appearing lady, in no respiratory distress  HEENT:  Unremarkable  NECK:  Supple  CV: regular rhythm, normal rate, normal pulses  RESPIRATORY: normal effort, breath sounds diminished at the bases, few scattered rhonchi noted, a couple of nonproductive cough noted during exam.  ABDOMEN: soft nontender in all quadrants  MUSCULOSKELETAL: no deformity, no asymmetry  NEURO: alert, moves all extremities, follows commands, diffuse mild weakness to all extremities but no obvious focal motor deficit pattern evident.  PSYCH:  calm, cooperative  SKIN: warm, dry     LAB RESULTS  Lab Results (last 24 hours)     Procedure Component Value Units Date/Time    Urinalysis, Microscopic Only - Straight Cath [[id number]]  (Abnormal) Collected: 03/21/23 1458    Specimen: Urine from Straight Cath Updated: 03/21/23 1536     RBC, UA   "     Unable to determine due to loaded field     /HPF     WBC, UA Too Numerous to Count /HPF      Bacteria, UA       Unable to determine due to loaded field     /HPF     Squamous Epithelial Cells, UA       Unable to determine due to loaded field     /HPF     Hyaline Casts, UA       Unable to determine due to loaded field     /LPF     Methodology Automated Microscopy    Urine Culture - Urine, Straight Cath [[id number]] Collected: 03/21/23 1458    Specimen: Urine from Straight Cath Updated: 03/21/23 1536    Urinalysis With Culture If Indicated - Straight Cath [[id number]]  (Abnormal) Collected: 03/21/23 1458    Specimen: Urine from Straight Cath Updated: 03/21/23 1531     Color, UA Other     Appearance, UA Turbid     pH, UA 6.0     Specific Gravity, UA 1.025     Glucose, UA Negative     Ketones, UA 15 mg/dL (1+)     Bilirubin, UA Negative     Blood, UA Large (3+)     Protein, UA >=300 mg/dL (3+)     Leuk Esterase, UA Moderate (2+)     Nitrite, UA Positive     Urobilinogen, UA 0.2 E.U./dL    Narrative:      In absence of clinical symptoms, the presence of pyuria, bacteria, and/or nitrites on the urinalysis result does not correlate with infection.    POC Glucose Once [[id number]]  (Normal) Collected: 03/21/23 1311    Specimen: Blood Updated: 03/21/23 1312     Glucose 94 mg/dL      Comment: Meter: [id number] : [id number] Cherrie SAAVEDRA       Urine Culture - Urine, Urine, Catheter [[id number]]  (Normal) Collected: 03/20/23 1122    Specimen: Urine, Catheter Updated: 03/21/23 1237     Urine Culture No growth    Blood Culture - Blood, Arm, Right [[id number]]  (Normal) Collected: 03/20/23 1143    Specimen: Blood from Arm, Right Updated: 03/21/23 1200     Blood Culture No growth at 24 hours    Blood Culture - Blood, Arm, Left [[id number]]  (Normal) Collected: 03/20/23 1143    Specimen: Blood from Arm, Left Updated: 03/21/23 1200     Blood Culture No growth at 24 hours    POC Glucose Once [[id number]]  (Normal)  Collected: 03/21/23 0548    Specimen: Blood Updated: 03/21/23 0549     Glucose 98 mg/dL      Comment: Meter: [id number] : [id number] Harpreet Gabriel CNA       CBC & Differential [[id number]]  (Abnormal) Collected: 03/21/23 0327    Specimen: Blood Updated: 03/21/23 0517    Narrative:      The following orders were created for panel order CBC & Differential.  Procedure                               Abnormality         Status                     ---------                               -----------         ------                     CBC Auto Differential[[id number]]        Abnormal            Final result                 Please view results for these tests on the individual orders.    CBC Auto Differential [[id number]]  (Abnormal) Collected: 03/21/23 0327    Specimen: Blood Updated: 03/21/23 0517     WBC 13.93 10*3/mm3      RBC 3.44 10*6/mm3      Hemoglobin 9.2 g/dL      Hematocrit 28.4 %      MCV 82.6 fL      MCH 26.7 pg      MCHC 32.4 g/dL      RDW 16.9 %      RDW-SD 50.8 fl      MPV 9.6 fL      Platelets 389 10*3/mm3      Neutrophil % 86.7 %      Lymphocyte % 5.7 %      Monocyte % 5.6 %      Eosinophil % 0.8 %      Basophil % 0.4 %      Immature Grans % 0.8 %      Neutrophils, Absolute 12.08 10*3/mm3      Lymphocytes, Absolute 0.80 10*3/mm3      Monocytes, Absolute 0.78 10*3/mm3      Eosinophils, Absolute 0.11 10*3/mm3      Basophils, Absolute 0.05 10*3/mm3      Immature Grans, Absolute 0.11 10*3/mm3      nRBC 0.0 /100 WBC     TSH [[id number]]  (Normal) Collected: 03/21/23 0327    Specimen: Blood Updated: 03/21/23 0500     TSH 3.500 uIU/mL     Lipid Panel [[id number]]  (Abnormal) Collected: 03/21/23 0327    Specimen: Blood Updated: 03/21/23 0456     Total Cholesterol 92 mg/dL      Triglycerides 95 mg/dL      HDL Cholesterol 26 mg/dL      LDL Cholesterol  47 mg/dL      VLDL Cholesterol 19 mg/dL      LDL/HDL Ratio 1.81    Narrative:      Cholesterol Reference Ranges  (U.S. Department of Health and Human Services ATP III  Classifications)    Desirable          <200 mg/dL  Borderline High    200-239 mg/dL  High Risk          >240 mg/dL      Triglyceride Reference Ranges  (U.S. Department of Health and Human Services ATP III Classifications)    Normal           <150 mg/dL  Borderline High  150-199 mg/dL  High             200-499 mg/dL  Very High        >500 mg/dL    HDL Reference Ranges  (U.S. Department of Health and Human Services ATP III Classifications)    Low     <40 mg/dl (major risk factor for CHD)  High    >60 mg/dl ('negative' risk factor for CHD)        LDL Reference Ranges  (U.S. Department of Health and Human Services ATP III Classifications)    Optimal          <100 mg/dL  Near Optimal     100-129 mg/dL  Borderline High  130-159 mg/dL  High             160-189 mg/dL  Very High        >189 mg/dL    Comprehensive Metabolic Panel [[id number]]  (Abnormal) Collected: 03/21/23 0327    Specimen: Blood Updated: 03/21/23 0456     Glucose 88 mg/dL      BUN 39 mg/dL      Creatinine 3.31 mg/dL      Sodium 134 mmol/L      Potassium 4.3 mmol/L      Chloride 93 mmol/L      CO2 26.6 mmol/L      Calcium 8.3 mg/dL      Total Protein 5.9 g/dL      Albumin 2.4 g/dL      ALT (SGPT) 7 U/L      AST (SGOT) 19 U/L      Alkaline Phosphatase 133 U/L      Total Bilirubin 0.4 mg/dL      Globulin 3.5 gm/dL      A/G Ratio 0.7 g/dL      BUN/Creatinine Ratio 11.8     Anion Gap 14.4 mmol/L      eGFR 15.7 mL/min/1.73     Narrative:      GFR Normal >60  Chronic Kidney Disease <60  Kidney Failure <15      Phosphorus [[id number]]  (Normal) Collected: 03/21/23 0327    Specimen: Blood Updated: 03/21/23 0456     Phosphorus 4.0 mg/dL     Magnesium [[id number]]  (Normal) Collected: 03/21/23 0327    Specimen: Blood Updated: 03/21/23 0456     Magnesium 1.8 mg/dL     Hemoglobin A1c [[id number]]  (Normal) Collected: 03/21/23 0327    Specimen: Blood Updated: 03/21/23 0446     Hemoglobin A1C 5.60 %     Narrative:      Hemoglobin A1C Ranges:    Increased Risk for Diabetes   5.7% to 6.4%  Diabetes                     >= 6.5%  Diabetic Goal                < 7.0%    POC Glucose Once [[id number]]  (Abnormal) Collected: 03/20/23 2153    Specimen: Blood Updated: 03/20/23 2154     Glucose 178 mg/dL      Comment: Meter: [id number] : [id number] Harpreet Gabriel CNA       BNP [[id number]]  (Abnormal) Collected: 03/20/23 1611    Specimen: Blood Updated: 03/20/23 1706     proBNP >70,000.0 pg/mL     Narrative:      Among patients with dyspnea, NT-proBNP is highly sensitive for the detection of acute congestive heart failure. In addition NT-proBNP of <300 pg/ml effectively rules out acute congestive heart failure with 99% negative predictive value.    Results may be falsely decreased if patient taking Biotin.      POC Glucose Once [[id number]]  (Abnormal) Collected: 03/20/23 1636    Specimen: Blood Updated: 03/20/23 1639     Glucose 194 mg/dL      Comment: Meter: [id number] : [id number] Zully SAAVEDRA           Imaging Results (Last 24 Hours)     Procedure Component Value Units Date/Time    CT Head Without Contrast [[id number]] Collected: 03/20/23 1352     Updated: 03/20/23 1740    Narrative:      EMERGENCY NONCONTRAST HEAD CT 03/20/2023     CLINICAL HISTORY: Altered mental status.     TECHNIQUE: Spiral CT images were obtained from the base of the skull to  the vertex without intravenous contrast. The images were reformatted and  are submitted in 3 mm thick axial, sagittal and coronal CT sections with  brain algorithm.      This is correlated to a prior noncontrast head CT from [facility] on 01/13/2023 and a prior MRI of the brain on 04/30/2022 and  head CTs on 04/29/2022 and 04/30/2022 and 05/01/2022.     FINDINGS: Back on 04/30/2022 the patient had a 2.3 x 1.7 cm  intraparenchymal hemorrhage in the superior left frontal white matter.  Now there is a slitlike area of increased density at the site of the  prior hemorrhage measuring 9 x 2 mm in size and likely  hemosiderin  deposition at the site of the prior old hemorrhage.  This hyperdense  focus has been present on multiple prior head CTs dating back to  07/27/2022 and is unchanged.. There is some patchy low-density extending  from the periventricular into the subcortical white matter of the  cerebral hemispheres consistent with mild-to-moderate small vessel  disease. The remainder of the brain parenchyma is normal in attenuation.  The ventricles are normal in size. I see no mass effect and no midline  shift and no extra-axial fluid collections are identified. There is no  evidence of acute intracranial hemorrhage. Calvarium and skull base are  normal in appearance.  The paranasal sinuses, mastoid air cells and  middle ear cavities are clear.       Impression:      1. No acute intracranial abnormality is identified with no change when  compared to multiple prior head CTs dating back to 07/27/2022.   2. There is mild-to-moderate small vessel disease in the cerebral white  matter and mild diffuse cerebral atrophy. There is 9 x 2 mm hyperdense  focus of the superior left frontal white matter at the site of a prior  intraparenchymal hemorrhage that occurred back in April 2022 and is felt  to be an area of hemosiderin deposition from an old hemorrhage at this  site and this focus of hyperdensity is unchanged dating back to  07/27/2022. The remainder of the head CT is normal.      Radiation dose reduction techniques were utilized, including automated  exposure control and exposure modulation based on body size.     This report was finalized on 3/20/2023 5:37 PM by Dr. Suhas Sanches M.D.       CT Chest Without Contrast Diagnostic [[id number]] Collected: 03/20/23 1440     Updated: 03/20/23 1610    Narrative:      CT OF THE CHEST WITHOUT CONTRAST 03/20/2023     HISTORY: Cough. Fever. Abnormal left hemithorax on today's chest  radiograph.     Spiral images were obtained from the lung apices to the upper abdomen.  No intravenous  contrast was given.     There is a moderately large left pleural effusion with moderately severe  atelectasis and/or consolidation of the left lower lobe.     Small right pleural effusion is seen with some mild right lower lobe  atelectasis or consolidation.     No pathologically enlarged hilar or mediastinal lymph nodes are seen.  Aorta is not dilated.       Impression:      1. Moderately large left pleural effusion with some left lower lobe  atelectasis and/or consolidation.  2. Small right pleural effusion with some mild right lower lobe  atelectasis and/or consolidation.  3. Short-term follow-up CT of the chest suggested.     Radiation dose reduction techniques were utilized, including automated  exposure control and exposure modulation based on body size.     This report was finalized on 3/20/2023 4:07 PM by Dr. Ran Wall M.D.           ECG 12 Lead             Component  Ref Range & Units 11:12  (3/20/23) 1 mo ago  (2/5/23) 1 mo ago  (1/22/23) 1 mo ago  (1/22/23) 2 mo ago  (1/1/23) 2 mo ago  (12/26/22) 3 mo ago  (12/4/22)   QT Interval  ms 415 P  418  391  372  397  514  490    Resulting Agency  ECG BH ECG BH ECG BH ECG BH ECG  ECG BH ECG             HEART RATE= 71  bpm  RR Interval= 845  ms  NE Interval= 160  ms  P Horizontal Axis= 61  deg  P Front Axis= 49  deg  QRSD Interval= 101  ms  QT Interval= 415  ms  QRS Axis= -33  deg  T Wave Axis= 43  deg  - OTHERWISE NORMAL ECG -  Sinus rhythm  Left axis deviation  Baseline wander in lead(s) I,II,III,aVR,aVF,V1,V4,V5             Current Facility-Administered Medications:   •  acetaminophen (TYLENOL) tablet 650 mg, 650 mg, Oral, Q6H PRN, Dimitri Pena MD, 650 mg at 03/20/23 1757  •  amLODIPine (NORVASC) tablet 10 mg, 10 mg, Oral, Q24H, Dimitri Pena MD, 10 mg at 03/21/23 1257  •  aspirin EC tablet 81 mg, 81 mg, Oral, Daily, Dimitri Pena MD, 81 mg at 03/21/23 1257  •  azithromycin (ZITHROMAX) 500 mg in sodium chloride 0.9 % 250 mL IVPB-VTB, 500 mg,  Intravenous, Q24H, Dimitri Pena MD, 500 mg at 03/20/23 1800  •  carvedilol (COREG) tablet 3.125 mg, 3.125 mg, Oral, BID With Meals, Dimitri Pena MD, 3.125 mg at 03/20/23 1730  •  cefTRIAXone (ROCEPHIN) 1 g in sodium chloride 0.9 % 100 mL IVPB-VTB, 1 g, Intravenous, Q24H, Dimitri Pena MD, Last Rate: 200 mL/hr at 03/20/23 1730, 1 g at 03/20/23 1730  •  Desvenlafaxine Succinate ER 25 mg, 25 mg, Oral, Daily, Dimitri Pena MD, 25 mg at 03/21/23 1257  •  folic acid (FOLVITE) tablet 1 mg, 1 mg, Oral, Daily, Dimitri Pena MD, 1 mg at 03/21/23 1257  •  heparin (porcine) injection 4,000 Units, 4,000 Units, Intracatheter, PRN, Alejo Lange MD, 4,000 Units at 03/21/23 1025  •  HYDROcodone-acetaminophen (NORCO) 5-325 MG per tablet 1 tablet, 1 tablet, Oral, Q4H PRN, Dimitri Pena MD, 1 tablet at 03/21/23 1309  •  insulin lispro (ADMELOG) injection 0-7 Units, 0-7 Units, Subcutaneous, 4x Daily With Meals & Nightly, Dimitri Pena MD, 2 Units at 03/20/23 2206  •  levETIRAcetam (KEPPRA) tablet 250 mg, 250 mg, Oral, BID, Dimitri Pena MD, 250 mg at 03/21/23 1257  •  levothyroxine (SYNTHROID, LEVOTHROID) tablet 300 mcg, 300 mcg, Oral, Daily, Dimitri Pena MD, 300 mcg at 03/21/23 1257  •  melatonin tablet 3 mg, 3 mg, Oral, Nightly PRN, Dimitri Pena MD  •  pantoprazole (PROTONIX) EC tablet 40 mg, 40 mg, Oral, Daily, Dimitri Pena MD, 40 mg at 03/21/23 1257  •  rOPINIRole (REQUIP) tablet 2 mg, 2 mg, Oral, Nightly, Dimitri Pena MD, 2 mg at 03/20/23 2120  •  sennosides-docusate (PERICOLACE) 8.6-50 MG per tablet 2 tablet, 2 tablet, Oral, Nightly PRN, Dimitri Pena MD  •  [COMPLETED] Insert Peripheral IV, , , Once **AND** sodium chloride 0.9 % flush 10 mL, 10 mL, Intravenous, PRN, Andrea Ocampo MD    ASSESSMENT  Left basilar pneumonia  Acute UTI  Metabolic encephalopathy  Worsening anemia  End-stage renal disease on hemodialysis  Right MCA CVA  Diastolic congestive heart failure  Insulin-dependent diabetes  mellitus  Hypertension   Hyperlipidemia  Hypothyroidism  Seizure disorder  Gastroesophageal reflux disease    PLAN  CPM  Supplemental oxygen nebulizer  Continue IV antibiotics   Transfuse as needed  Adjust nursing home medications  Stress ulcer DVT prophylaxis  Nephrology to follow patient for hemodialysis  Infectious disease to follow patient  Supportive care  DNR  PT OT  Discussed with family and nursing staff  Follow closely further recommendation current hospital course    TERRELL DELVALLE MD    Copied text in this note has been reviewed and is accurate as of 03/21/23

## 2023-03-21 NOTE — PROGRESS NOTES
"  Infectious Diseases Progress Note    Debbie Quick MD     [facility]  [place]: 1 day  Patient Identification:  Name: Zaina Martinez  Age: 57 y.o.  Sex: female  :  1965  MRN: [id number]         Primary Care Physician: Norman Serrato MD        Subjective: Continues to feel weak denies any fever and chills.  Interval History: See consultation note.    Objective:    Scheduled Meds:amLODIPine, 10 mg, Oral, Q24H  aspirin, 81 mg, Oral, Daily  azithromycin, 500 mg, Intravenous, Q24H  carvedilol, 3.125 mg, Oral, BID With Meals  cefTRIAXone, 1 g, Intravenous, Q24H  Desvenlafaxine Succinate ER, 25 mg, Oral, Daily  folic acid, 1 mg, Oral, Daily  insulin lispro, 0-7 Units, Subcutaneous, 4x Daily With Meals & Nightly  levETIRAcetam, 250 mg, Oral, BID  levothyroxine, 300 mcg, Oral, Daily  pantoprazole, 40 mg, Oral, Daily  rOPINIRole, 2 mg, Oral, Nightly      Continuous Infusions:     Vital signs in last 24 hours:  Temp:  [97.4 °F (36.3 °C)-99.1 °F (37.3 °C)] 98.1 °F (36.7 °C)  Heart Rate:  [62-82] 72  Resp:  [14-18] 16  BP: (129-154)/(64-89) 154/69    Intake/Output:    Intake/Output Summary (Last 24 hours) at 3/21/2023 1004  Last data filed at 3/21/2023 0756  Gross per 24 hour   Intake 907.08 ml   Output 0 ml   Net 907.08 ml       Exam:  /69 (BP Location: Left arm, Patient Position: Lying)   Pulse 72   Temp 98.1 °F (36.7 °C) (Oral)   Resp 16   Ht 157.5 cm (62\")   Wt 64.3 kg (141 lb 12.8 oz)   SpO2 97%   BMI 25.94 kg/m²   Patient is examined using the personal protective equipment as per guidelines from infection control for this particular patient as enacted.  Hand washing was performed before and after patient interaction.  General Appearance:  Chronically ill-appearing female                          Head:    Normocephalic, without obvious abnormality, atraumatic                           Eyes:    PERRL, conjunctivae/corneas clear, EOM's intact, both eyes                         Throat:   Lips, " tongue, gums normal; oral mucosa pink and moist                           Neck:   Supple, symmetrical, trachea midline, no JVD                         Lungs:    No obvious use of accessory muscles of breathing noted decreased breath sounds at the bases.                 Chest Wall:    No tenderness or deformity                          Heart:  S1-S2 irregular                  Abdomen:   Soft nontender                 Extremities:   Extremities normal, atraumatic, no cyanosis or edema                        Pulses:   Pulses palpable in all extremities                            Skin: Bruising and ecchymotic changes noted                  Neurologic: Withdrawn and weak and does not engage.       Data Review:    I reviewed the patient's new clinical results.  Results from last 7 days   Lab Units 03/21/23  0327 03/20/23  1203   WBC 10*3/mm3 13.93* 13.57*   HEMOGLOBIN g/dL 9.2* 7.0*   PLATELETS 10*3/mm3 389 353     Results from last 7 days   Lab Units 03/21/23  0327 03/20/23  1203   SODIUM mmol/L 134* 133*   POTASSIUM mmol/L 4.3 4.2   CHLORIDE mmol/L 93* 95*   CO2 mmol/L 26.6 25.7   BUN mg/dL 39* 33*   CREATININE mg/dL 3.31* 3.11*   CALCIUM mg/dL 8.3* 8.2*   GLUCOSE mg/dL 88 214*     Microbiology Results (last 10 days)     Procedure Component Value - Date/Time    Respiratory Panel PCR w/COVID-19(SARS-CoV-2) [facility] In-House, NP Swab in UTM/VTM, 3-4 HR TAT - Swab, Nasopharynx [[id number]]  (Normal) Collected: 03/20/23 1122    Lab Status: Final result Specimen: Swab from Nasopharynx Updated: 03/20/23 1359     ADENOVIRUS, PCR Not Detected     Coronavirus 229E Not Detected     Coronavirus HKU1 Not Detected     Coronavirus NL63 Not Detected     Coronavirus OC43 Not Detected     COVID19 Not Detected     Human Metapneumovirus Not Detected     Human Rhinovirus/Enterovirus Not Detected     Influenza A PCR Not Detected     Influenza B PCR Not Detected     Parainfluenza Virus 1 Not Detected     Parainfluenza Virus  2 Not Detected     Parainfluenza Virus 3 Not Detected     Parainfluenza Virus 4 Not Detected     RSV, PCR Not Detected     Bordetella pertussis pcr Not Detected     Bordetella parapertussis PCR Not Detected     Chlamydophila pneumoniae PCR Not Detected     Mycoplasma pneumo by PCR Not Detected    Narrative:      In the setting of a positive respiratory panel with a viral infection PLUS a negative procalcitonin without other underlying concern for bacterial infection, consider observing off antibiotics or discontinuation of antibiotics and continue supportive care. If the respiratory panel is positive for atypical bacterial infection (Bordetella pertussis, Chlamydophila pneumoniae, or Mycoplasma pneumoniae), consider antibiotic de-escalation to target atypical bacterial infection.            Assessment:    Urinary tract infection without hematuria, site unspecified  1-systemic sepsis due to:              -pneumonia bilateral with moderate left-sided pleural effusion and mild right-sided pleural effusion with risk of complications of pneumonia such as evolving empyema              -urinary tract infection in the setting of end-stage renal disease and minimal urinary output              -possible line sepsis due to infected hemodialysis catheter  2-end-stage renal disease on hemodialysis  3-reactive diabetes  4-anemia  5-rheumatoid arthritis  6-other diagnoses per primary team.     Recommendations/Discussions:  Continue present antibiotic therapy while closely monitoring her clinical course and culture results.  Supportive care for underlying issues including dialysis support per primary team and nephrology service.  Debbie Quick MD  3/21/2023  10:04 EDT    Parts of this note may be an electronic transcription/translation of spoken language to printed text using the Dragon dictation system.

## 2023-03-22 LAB
ANION GAP SERPL CALCULATED.3IONS-SCNC: 9 MMOL/L (ref 5–15)
BASOPHILS # BLD AUTO: 0.04 10*3/MM3 (ref 0–0.2)
BASOPHILS NFR BLD AUTO: 0.3 % (ref 0–1.5)
BUN SERPL-MCNC: 19 MG/DL (ref 6–20)
BUN/CREAT SERPL: 9.9 (ref 7–25)
CALCIUM SPEC-SCNC: 7.7 MG/DL (ref 8.6–10.5)
CHLORIDE SERPL-SCNC: 98 MMOL/L (ref 98–107)
CO2 SERPL-SCNC: 26 MMOL/L (ref 22–29)
CREAT SERPL-MCNC: 1.92 MG/DL (ref 0.57–1)
DEPRECATED RDW RBC AUTO: 51.2 FL (ref 37–54)
EGFRCR SERPLBLD CKD-EPI 2021: 30.1 ML/MIN/1.73
EOSINOPHIL # BLD AUTO: 0.07 10*3/MM3 (ref 0–0.4)
EOSINOPHIL NFR BLD AUTO: 0.6 % (ref 0.3–6.2)
ERYTHROCYTE [DISTWIDTH] IN BLOOD BY AUTOMATED COUNT: 16.7 % (ref 12.3–15.4)
GLUCOSE BLDC GLUCOMTR-MCNC: 111 MG/DL (ref 70–130)
GLUCOSE BLDC GLUCOMTR-MCNC: 124 MG/DL (ref 70–130)
GLUCOSE BLDC GLUCOMTR-MCNC: 132 MG/DL (ref 70–130)
GLUCOSE BLDC GLUCOMTR-MCNC: 136 MG/DL (ref 70–130)
GLUCOSE SERPL-MCNC: 129 MG/DL (ref 65–99)
HCT VFR BLD AUTO: 28.3 % (ref 34–46.6)
HGB BLD-MCNC: 9.2 G/DL (ref 12–15.9)
IMM GRANULOCYTES # BLD AUTO: 0.06 10*3/MM3 (ref 0–0.05)
IMM GRANULOCYTES NFR BLD AUTO: 0.5 % (ref 0–0.5)
LYMPHOCYTES # BLD AUTO: 0.87 10*3/MM3 (ref 0.7–3.1)
LYMPHOCYTES NFR BLD AUTO: 7.4 % (ref 19.6–45.3)
MCH RBC QN AUTO: 27.1 PG (ref 26.6–33)
MCHC RBC AUTO-ENTMCNC: 32.5 G/DL (ref 31.5–35.7)
MCV RBC AUTO: 83.5 FL (ref 79–97)
MONOCYTES # BLD AUTO: 0.75 10*3/MM3 (ref 0.1–0.9)
MONOCYTES NFR BLD AUTO: 6.4 % (ref 5–12)
NEUTROPHILS NFR BLD AUTO: 84.8 % (ref 42.7–76)
NEUTROPHILS NFR BLD AUTO: 9.89 10*3/MM3 (ref 1.7–7)
NRBC BLD AUTO-RTO: 0 /100 WBC (ref 0–0.2)
PLATELET # BLD AUTO: 357 10*3/MM3 (ref 140–450)
PMV BLD AUTO: 9.6 FL (ref 6–12)
POTASSIUM SERPL-SCNC: 3.4 MMOL/L (ref 3.5–5.2)
RBC # BLD AUTO: 3.39 10*6/MM3 (ref 3.77–5.28)
SODIUM SERPL-SCNC: 133 MMOL/L (ref 136–145)
WBC NRBC COR # BLD: 11.68 10*3/MM3 (ref 3.4–10.8)

## 2023-03-22 PROCEDURE — 25010000002 AZITHROMYCIN PER 500 MG: Performed by: HOSPITALIST

## 2023-03-22 PROCEDURE — 85025 COMPLETE CBC W/AUTO DIFF WBC: CPT | Performed by: HOSPITALIST

## 2023-03-22 PROCEDURE — 82962 GLUCOSE BLOOD TEST: CPT

## 2023-03-22 PROCEDURE — 97162 PT EVAL MOD COMPLEX 30 MIN: CPT

## 2023-03-22 PROCEDURE — 25010000002 CEFTRIAXONE PER 250 MG: Performed by: HOSPITALIST

## 2023-03-22 PROCEDURE — 80048 BASIC METABOLIC PNL TOTAL CA: CPT | Performed by: HOSPITALIST

## 2023-03-22 PROCEDURE — 97166 OT EVAL MOD COMPLEX 45 MIN: CPT

## 2023-03-22 PROCEDURE — 97530 THERAPEUTIC ACTIVITIES: CPT

## 2023-03-22 RX ORDER — LIDOCAINE 50 MG/G
1 PATCH TOPICAL
Status: DISCONTINUED | OUTPATIENT
Start: 2023-03-22 | End: 2023-03-25

## 2023-03-22 RX ORDER — LIDOCAINE 50 MG/G
1 PATCH TOPICAL
Status: DISCONTINUED | OUTPATIENT
Start: 2023-03-22 | End: 2023-03-28 | Stop reason: HOSPADM

## 2023-03-22 RX ORDER — ONDANSETRON 2 MG/ML
4 INJECTION INTRAMUSCULAR; INTRAVENOUS EVERY 4 HOURS PRN
Status: DISCONTINUED | OUTPATIENT
Start: 2023-03-22 | End: 2023-03-28

## 2023-03-22 RX ADMIN — HYDROCODONE BITARTRATE AND ACETAMINOPHEN 1 TABLET: 5; 325 TABLET ORAL at 15:27

## 2023-03-22 RX ADMIN — CEFTRIAXONE SODIUM 1 G: 1 INJECTION, POWDER, FOR SOLUTION INTRAMUSCULAR; INTRAVENOUS at 18:31

## 2023-03-22 RX ADMIN — ASPIRIN 81 MG: 81 TABLET, COATED ORAL at 08:39

## 2023-03-22 RX ADMIN — CARVEDILOL 3.12 MG: 3.12 TABLET, FILM COATED ORAL at 21:03

## 2023-03-22 RX ADMIN — LEVETIRACETAM 250 MG: 250 TABLET, FILM COATED ORAL at 08:39

## 2023-03-22 RX ADMIN — DESVENLAFAXINE SUCCINATE 25 MG: 25 TABLET, EXTENDED RELEASE ORAL at 08:39

## 2023-03-22 RX ADMIN — LEVETIRACETAM 250 MG: 250 TABLET, FILM COATED ORAL at 21:03

## 2023-03-22 RX ADMIN — PANTOPRAZOLE SODIUM 40 MG: 40 TABLET, DELAYED RELEASE ORAL at 08:39

## 2023-03-22 RX ADMIN — CARVEDILOL 3.12 MG: 3.12 TABLET, FILM COATED ORAL at 08:39

## 2023-03-22 RX ADMIN — AZITHROMYCIN DIHYDRATE 500 MG: 500 INJECTION, POWDER, LYOPHILIZED, FOR SOLUTION INTRAVENOUS at 17:13

## 2023-03-22 RX ADMIN — LEVOTHYROXINE SODIUM 300 MCG: 0.15 TABLET ORAL at 08:39

## 2023-03-22 RX ADMIN — AMLODIPINE BESYLATE 10 MG: 10 TABLET ORAL at 08:39

## 2023-03-22 RX ADMIN — HYDROCODONE BITARTRATE AND ACETAMINOPHEN 1 TABLET: 5; 325 TABLET ORAL at 05:35

## 2023-03-22 RX ADMIN — Medication 1 MG: at 08:39

## 2023-03-22 RX ADMIN — HYDROCODONE BITARTRATE AND ACETAMINOPHEN 1 TABLET: 5; 325 TABLET ORAL at 09:47

## 2023-03-22 RX ADMIN — HYDROCODONE BITARTRATE AND ACETAMINOPHEN 1 TABLET: 5; 325 TABLET ORAL at 21:03

## 2023-03-22 RX ADMIN — ROPINIROLE 2 MG: 2 TABLET, FILM COATED ORAL at 21:03

## 2023-03-22 RX ADMIN — LIDOCAINE 1 PATCH: 50 PATCH CUTANEOUS at 15:21

## 2023-03-22 NOTE — PROGRESS NOTES
[facility] Progress Note      Patient Name: Zaina Martinez  : 1965  MRN: [id number]  Primary Care Physician:  Norman Serrato MD  Date of admission: 3/20/2023    Subjective     Interval History:   Follow up ESRD.  RIbs painful.  XRay confirms bilateral rib fractures . Right 6, left 8th.  Large left pleural effusion .  Eating. Bowels moving.     Review of Systems:   As noted above    Objective     Vitals:   Temp:  [97.7 °F (36.5 °C)-98.7 °F (37.1 °C)] 97.7 °F (36.5 °C)  Heart Rate:  [71-82] 71  Resp:  [16-20] 18  BP: (151-177)/(71-76) 151/76  Flow (L/min):  [1] 1    Intake/Output Summary (Last 24 hours) at 3/22/2023 1222  Last data filed at 3/22/2023 0910  Gross per 24 hour   Intake 360 ml   Output --   Net 360 ml       Physical Exam:    General Appearance: Very Chronically ill and frail.    Skin: warm and dry. pale  HEENT:oral mucosa moist.   Neck:RIJ TDC. Tunnel non-tender.   Lungs: decreased bs left lower lobe.   Heart: RRR, no s3 or rub  Abdomen: soft, nontender, nondistended, very active bs  Extremities: no edema.  Neuro: flat affect. . Moves all 4 ext.    Scheduled Meds:     amLODIPine, 10 mg, Oral, Q24H  aspirin, 81 mg, Oral, Daily  azithromycin, 500 mg, Intravenous, Q24H  carvedilol, 3.125 mg, Oral, BID With Meals  cefTRIAXone, 1 g, Intravenous, Q24H  Desvenlafaxine Succinate ER, 25 mg, Oral, Daily  folic acid, 1 mg, Oral, Daily  insulin lispro, 0-7 Units, Subcutaneous, 4x Daily With Meals & Nightly  levETIRAcetam, 250 mg, Oral, BID  levothyroxine, 300 mcg, Oral, Daily  pantoprazole, 40 mg, Oral, Daily  rOPINIRole, 2 mg, Oral, Nightly      IV Meds:        Results Reviewed:   I have personally reviewed the results from the time of this admission to 3/22/2023 12:22 EDT     Results from last 7 days   Lab Units 23  0403 23  0327 23  1203   SODIUM mmol/L 133* 134* 133*   POTASSIUM mmol/L 3.4* 4.3 4.2   CHLORIDE mmol/L 98 93* 95*   CO2 mmol/L 26.0 26.6 25.7    BUN mg/dL 19 39* 33*   CREATININE mg/dL 1.92* 3.31* 3.11*   CALCIUM mg/dL 7.7* 8.3* 8.2*   BILIRUBIN mg/dL  --  0.4 0.3   ALK PHOS U/L  --  133* 137*   ALT (SGPT) U/L  --  7 <5   AST (SGOT) U/L  --  19 13   GLUCOSE mg/dL 129* 88 214*       Estimated Creatinine Clearance: 28.5 mL/min (A) (by C-G formula based on SCr of 1.92 mg/dL (H)).    Results from last 7 days   Lab Units 03/21/23  0327   MAGNESIUM mg/dL 1.8   PHOSPHORUS mg/dL 4.0             Results from last 7 days   Lab Units 03/22/23  0403 03/21/23  0327 03/20/23  1203   WBC 10*3/mm3 11.68* 13.93* 13.57*   HEMOGLOBIN g/dL 9.2* 9.2* 7.0*   PLATELETS 10*3/mm3 357 389 353             Assessment / Plan     ASSESSMENT:  1. ESRD. Dialysis tomorrow .  2. Sepsis with multiple sources, PNA , moderate left and small right pleural effusions, TDC, UTI . No growth on cultures so far. No fever. WBC better.   3. Chronic diastolic  heart failure.  4. HTN controlled.   5. Anemia ckd and blood loss. 1 unit  PRBC 3/20.   6. Left renal hematoma after left ureteral stent 12/30/22.  Left renal artery embolization 1/1/23.  Left stent removal 1/10/23.   7. Rib fractures. Right 6, left 8th.  8. Hypothyroid on replacement.   9. Labile DM2    PLAN:  1. Dialysis tomorrow.  2. No need to replace K  3. Try lidoderm patch for ribs.       Ruth Lira MD  03/22/23  12:22 EDT    [facility]\""  [phone]

## 2023-03-22 NOTE — THERAPY EVALUATION
Patient Name: Zaina Martinez  : 1965    MRN: [id number]                              Today's Date: 3/22/2023       Admit Date: 3/20/2023    Visit Dx:     ICD-10-CM ICD-9-CM   1. Urinary tract infection without hematuria, site unspecified  N39.0 599.0   2. Pneumonia of left lower lobe due to infectious organism  J18.9 486   3. Altered mental status, unspecified altered mental status type  R41.82 780.97   4. End stage renal disease ([facility])  N18.6 585.6   5. Anemia due to chronic kidney disease, on chronic dialysis ([facility])  N18.6 585.6    D63.1 285.21    Z99.2 V45.11   6. Pleural effusion  J90 511.9   7. Hypoxia  R09.02 799.02     Patient Active Problem List   Diagnosis   • Renal insufficiency   • Hypertensive disorder   • Hypothyroidism   • Type 2 diabetes mellitus with kidney complication, with long-term current use of insulin ([facility])   • Rheumatoid arthritis ([facility])   • Angioedema   • Esophageal dysmotility   • Anemia   • Medically noncompliant   • Myocardial infarction due to demand ischemia ([facility])   • Enteritis   • PRES (posterior reversible encephalopathy syndrome)   • Urine retention   • Klebsiella infection   • Superficial thrombophlebitis   • Generalized weakness   • ESRD (end stage renal disease) ([facility])   • CAD (coronary artery disease)   • Abnormal urinalysis   • Chronic diastolic CHF (congestive heart failure) ([facility])   • Pyelonephritis   • Calculus of gallbladder with acute on chronic cholecystitis without obstruction   • Pleural effusion on right   • Anemia due to chronic kidney disease, on chronic dialysis ([facility])   • Abnormal findings on diagnostic imaging of other specified body structures   • Acute upper respiratory infection   • Agitation   • Alkaline phosphatase raised   • Casts present in urine   • Cellulitis of toe   • Hip pain   • Community acquired pneumonia   • Depressive disorder   • Diarrhea of presumed infectious origin   • Difficult or painful urination   • Disease due to severe acute respiratory  syndrome coronavirus 2 (SARS-CoV-2)   • Dyspnea   • Encounter for follow-up examination after completed treatment for conditions other than malignant neoplasm   • H/O: hypothyroidism   • Hyperlipidemia   • Hypomagnesemia   • Intractable vomiting with nausea   • Leukocytosis   • Luetscher's syndrome   • Need for influenza vaccination   • Restless legs   • Noncompliance with treatment   • Shoulder pain   • Acute UTI (urinary tract infection)   • Metabolic encephalopathy   • Abnormal findings on diagnostic imaging of abdomen   • Status post cholecystectomy   • Hyponatremia   • Acute metabolic encephalopathy   • Encephalopathy, toxic   • Acute CVA (cerebrovascular accident) ([facility])   • History of intracranial hemorrhage   • Stroke (HCC)   • Abnormal urinalysis   • Diabetic muscle infarction ([facility])   • Other insomnia   • Altered mental status   • History of stroke- R MCA s/p TPA with subsequent ICH with debility   • Heart murmur   • Bradycardia   • Left lower lobe pneumonia   • Metabolic encephalopathy   • Pericardial effusion   • Pleural effusion   • Acute pulmonary edema ([facility])   • Atrial flutter ([facility])   • Chronic systolic CHF (congestive heart failure) ([facility])   • Thrombus of venous dialysis catheter ([facility])   • Sepsis without acute organ dysfunction ([facility])   • Type 2 diabetes mellitus with hyperglycemia, with long-term current use of insulin ([facility])   • Acute respiratory failure with hypoxia ([facility])   • Acute on chronic systolic CHF (congestive heart failure) ([facility])   • Hyperkalemia   • Acute respiratory failure with hypoxia ([facility])   • Other hypervolemia   • Hematoma of right hip, initial encounter   • Ureteral stent present   • Closed intertrochanteric fracture of right femur ([facility])   • Witnessed seizure-like activity ([facility])   • Acute respiratory failure with hypercapnia ([facility])   • Opioid use   • Diabetic muscle infarction ([facility])   • Acute posthemorrhagic anemia   • Kidney hematoma   • Pyuria   • Severe malnutrition ([facility])   • Moderate  malnutrition (HCC)   • Hypophosphatemia   • Urinary tract infection without hematuria, site unspecified     Past Medical History:   Diagnosis Date   • Acute CVA (cerebrovascular accident) (HCC) 05/01/2022   • Acute on chronic diastolic CHF (congestive heart failure) (HCC)    • Anemia    • CAD (coronary artery disease) 12/20/2021   • Diabetes (HCC)    • Disease of thyroid gland    • GERD (gastroesophageal reflux disease)    • History of intracranial hemorrhage 5/1/2022   • Hyperlipidemia 11/30/2018   • Hypertension    • Rheumatoid arthritis (HCC)    • Stage 5 chronic kidney disease (HCC)      Past Surgical History:   Procedure Laterality Date   • CHOLECYSTECTOMY WITH INTRAOPERATIVE CHOLANGIOGRAM N/A 1/10/2022    Procedure: Laparoscopic cholecystectomy with intraoperative cholangiogram;  Surgeon: Ramana Raygoza MD;  Location: [facility];  Service: General;  Laterality: N/A;   • CYSTOSCOPY W/ URETERAL STENT PLACEMENT Left 9/29/2022    Procedure: CYSTOSCOPY URETERAL STENT INSERTION WITH RETROGRADE PYELOGRAM;  Surgeon: Bryant Phan Jr., MD;  Location: [facility];  Service: Urology;  Laterality: Left;   • CYSTOSCOPY W/ URETERAL STENT PLACEMENT Left 1/10/2023    Procedure: CYSTOSCOPY LEFT STENT REMOVAL;  Surgeon: Bryant Phan Jr., MD;  Location: [facility] OR;  Service: Urology;  Laterality: Left;   • EMBOLIZATION MESENTERIC ARTERY N/A 1/1/2023    Procedure: LEFT KIDNEY EMBOLIZATION;  Surgeon: Bijan Ordoñez MD;  Location: [facility] 18/19;  Service: Interventional Radiology;  Laterality: N/A;   • EYE SURGERY     • FEMUR IM NAILING/RODDING Right 11/10/2022    Procedure: FEMUR INTRAMEDULLARY NAILING/RODDING;  Surgeon: Glen Pierce MD;  Location: [facility] OR;  Service: Orthopedics;  Laterality: Right;   • HIP INTERTROCHANTERIC NAILING Right 11/10/2022    Procedure: HIP INTERTROCHANTERIC NAILING;  Surgeon: Glen Pierce MD;  Location: [facility] OR;  Service:  Orthopedics;  Laterality: Right;   • HYSTERECTOMY     • INSERT CENTRAL LINE AT BEDSIDE  12/31/2022        • INSERTION HEMODIALYSIS CATHETER N/A 12/6/2021    Procedure: HEMODIALYSIS CATHETER INSERTION;  Surgeon: Keli Salazar MD;  [facility]  Service: Vascular;  Laterality: N/A;   • INSERTION HEMODIALYSIS CATHETER N/A 5/3/2022    Procedure: TUNNELED CATHETER PLACEMENT;  Surgeon: Keli Salazar MD;  Location: [facility] OR;  Service: Vascular;  Laterality: N/A;   • MUSCLE BIOPSY Left 6/15/2022    Procedure: Left quadriceps muscle biopsy;  Surgeon: Marques Ma MD;  Location: [facility] OR;  Service: Neurosurgery;  Laterality: Left;   • URETEROSCOPY LASER LITHOTRIPSY WITH STENT INSERTION Left 12/30/2022    Procedure: CYSTOSCOPY WITH LEFT  URETEROSCOPY  LEFT STENT EXCHANGE;  Surgeon: Bryant Phan Jr., MD;  Location: [facility];  Service: Urology;  Laterality: Left;      General Information     Row Name 03/22/23 0949          OT Time and Intention    Document Type evaluation  -KB     Mode of Treatment individual therapy;occupational therapy  -KB     Row Name 03/22/23 0949          General Information    Patient Profile Reviewed yes  -KB     Prior Level of Function mod assist:;ADL's;w/c or scooter  -KB     Existing Precautions/Restrictions fall  -KB     Barriers to Rehab medically complex;previous functional deficit  -KB     Row Name 03/22/23 0949          Living Environment    People in Home spouse  -KB     Row Name 03/22/23 0949          Home Main Entrance    Number of Stairs, Main Entrance --  ramp  -KB     Row Name 03/22/23 0949          Cognition    Orientation Status (Cognition) oriented to;person;place;time  -KB     Row Name 03/22/23 0949          Safety Issues, Functional Mobility    Safety Issues Affecting Function (Mobility) insight into deficits/self-awareness;judgment  -KB     Impairments Affecting Function (Mobility) strength;pain;endurance/activity  tolerance  -           User Key  (r) = Recorded By, (t) = Taken By, (c) = Cosigned By    Initials Name Provider Type    Manjula Self OT Occupational Therapist                 Mobility/ADL's     Row Name 03/22/23 0951          Bed Mobility    Scooting/Bridging [place] (Bed Mobility) maximum assist (25% patient effort);dependent (less than 25% patient effort);2 person assist;verbal cues;nonverbal cues (demo/gesture)  -     Comment, (Bed Mobility) pt declined to do much movement this date due to pain from recent rib fx  -     Row Name 03/22/23 0951          Transfers    Comment, (Transfers) NT  -     Row Name 03/22/23 0951          Functional Mobility    Functional Mobility- Comment NT  -[facility] Name 03/22/23 0951          Activities of Daily Living    BADL Assessment/Intervention grooming;lower body dressing  -     Row Name 03/22/23 0951          Grooming Assessment/Training    [place] Level (Grooming) hair care, combing/brushing;oral care regimen;set up  -     Position (Grooming) sitting up in bed;supported sitting  -     Row Name 03/22/23 0951          Lower Body Dressing Assessment/Training    [place] Level (Lower Body Dressing) lower body dressing skills;dependent (less than 25% patient effort)  -           User Key  (r) = Recorded By, (t) = Taken By, (c) = Cosigned By    Initials Name Provider Type    Manjula Self OT Occupational Therapist               Obj/Interventions     Row Name 03/22/23 0952          Sensory Assessment (Somatosensory)    Sensory Assessment (Somatosensory) sensation intact  -     Row Name 03/22/23 0952          Vision Assessment/Intervention    Visual Impairment/Limitations WNL  -     Row Name 03/22/23 0952          Range of Motion Comprehensive    General Range of Motion no range of motion deficits identified  -     Row Name 03/22/23 0952          Strength Comprehensive (MMT)    Comment, General Manual Muscle Testing (MMT) Assessment B UE  strength grossly 3+/5  -KB     Row Name 03/22/23 0952          Shoulder (Therapeutic Exercise)    Shoulder (Therapeutic Exercise) AROM (active range of motion)  -KB     Shoulder AROM (Therapeutic Exercise) bilateral;flexion;extension;5 repetitions  -KB     Row Name 03/22/23 0952          Elbow/Forearm (Therapeutic Exercise)    Elbow/Forearm (Therapeutic Exercise) AROM (active range of motion)  -KB     Elbow/Forearm AROM (Therapeutic Exercise) flexion;extension;bilateral;5 repetitions  -KB     Row Name 03/22/23 0952          Motor Skills    Motor Skills functional endurance  -KB     Functional Endurance poor  -KB     Therapeutic Exercise shoulder;elbow/forearm  -KB     Row Name 03/22/23 0952          Balance    Comment, Balance NT  -KB           User Key  (r) = Recorded By, (t) = Taken By, (c) = Cosigned By    Initials Name Provider Type    KB Manjula Alonso, OT Occupational Therapist               Goals/Plan     Row Name 03/22/23 0959          Bed Mobility Goal 1 (OT)    Activity/Assistive Device (Bed Mobility Goal 1, OT) sit to supine/supine to sit  -KB     [place] Level/Cues Needed (Bed Mobility Goal 1, OT) minimum assist (75% or more patient effort)  -KB     Time Frame (Bed Mobility Goal 1, OT) 2 weeks  -KB     Progress/Outcomes (Bed Mobility Goal 1, OT) goal ongoing  -     Row Name 03/22/23 0959          Transfer Goal 1 (OT)    Activity/Assistive Device (Transfer Goal 1, OT) toilet  -KB     [place] Level/Cues Needed (Transfer Goal 1, OT) minimum assist (75% or more patient effort)  -KB     Time Frame (Transfer Goal 1, OT) 2 weeks  -KB     Progress/Outcome (Transfer Goal 1, OT) goal ongoing  -     Row Name 03/22/23 0959          Toileting Goal 1 (OT)    Activity/Device (Toileting Goal 1, OT) adjust/manage clothing;perform perineal hygiene  -KB     [place] Level/Cues Needed (Toileting Goal 1, OT) moderate assist (50-74% patient effort)  -KB     Time Frame (Toileting Goal 1, OT) 2 weeks  -KB      Progress/Outcome (Toileting Goal 1, OT) goal ongoing  -KB     Row Name 03/22/23 0959          Strength Goal 1 (OT)    Strength Goal 1 (OT) B UE 4/5 grossly  -KB     Time Frame (Strength Goal 1, OT) 2 weeks  -KB     Progress/Outcome (Strength Goal 1, OT) goal ongoing  -KB     Row Name 03/22/23 0959          Problem Specific Goal 1 (OT)    Problem Specific Goal 1 (OT) poor+ functional endurance  -KB     Time Frame (Problem Specific Goal 1, OT) 2 weeks  -KB     Progress/Outcome (Problem Specific Goal 1, OT) goal ongoing  -KB     Row Name 03/22/23 0959          Therapy Assessment/Plan (OT)    Planned Therapy Interventions (OT) activity tolerance training;strengthening exercise;patient/caregiver education/training  -KB           User Key  (r) = Recorded By, (t) = Taken By, (c) = Cosigned By    Initials Name Provider Type    KB Manjula Alonso, VIOLET Occupational Therapist               Clinical Impression     Row Name 03/22/23 0953          Pain Assessment    Pretreatment Pain Rating 9/10  -KB     Posttreatment Pain Rating 9/10  -KB     Pain Location - flank  -KB     Pain Intervention(s) Repositioned  -KB     Row Name 03/22/23 0953          Plan of Care Review    Plan of Care Reviewed With patient  -KB     Outcome Evaluation Pt seen for OT eval this date. Pt admitted ith UTI. Sustained recent rib fx from  tranferring her which is limiting participation due to pain. Pt has reoccurring history of hospital, snf stays. Most recently dc from rehab/snf 4 days ago and is now back in hospital. Pt required assist with adls from  and used wheelchair for all fm. Pt declined to participate in any functional mobility this date due to increased pain in side due to rib fx. Pt did participate in grooming sitting with head inclined. Required set up for oral and hair care. B UEs wfl for ROM. B UE strength 3+/5. Pt demonstrating poor functional endurance, strength below basline which is impacting abilty to perform functional  activities. Will benefit from acute OT to address. Pt will likely need snf, hh at [place]  -     Row Name 03/22/23 0953          Therapy Assessment/Plan (OT)    Rehab Potential (OT) good, to achieve stated therapy goals  -     Criteria for Skilled Therapeutic Interventions Met (OT) yes;meets criteria;skilled treatment is necessary  -     Therapy Frequency (OT) 3 times/wk  -     Row Name 03/22/23 0953          Therapy Plan Review/Discharge Plan (OT)    Anticipated Discharge Disposition (OT) home with home health;skilled nursing facility;home with 24/7 care  -     Row Name 03/22/23 0953          Positioning and Restraints    Pre-Treatment Position in bed  -KB     Post Treatment Position bed  -KB     In Bed notified nsg;call light within reach;encouraged to call for assist  -KB           User Key  (r) = Recorded By, (t) = Taken By, (c) = Cosigned By    Initials Name Provider Type    KB Manjula Alonso, OT Occupational Therapist               Outcome Measures     Row Name 03/22/23 1001          How much help from another is currently needed...    Putting on and taking off regular lower body clothing? 1  -KB     Bathing (including washing, rinsing, and drying) 1  -KB     Toileting (which includes using toilet bed pan or urinal) 1  -KB     Putting on and taking off regular upper body clothing 2  -KB     Taking care of personal grooming (such as brushing teeth) 3  -KB     Eating meals 3  -KB     AM-PAC 6 Clicks Score (OT) 11  -KB     Row Name 03/22/23 0920          How much help from another person do you currently need...    Turning from your back to your side while in flat bed without using bedrails? 2  -MS     Moving from lying on back to sitting on the side of a flat bed without bedrails? 1  -MS     Moving to and from a bed to a chair (including a wheelchair)? 1  -MS     Standing up from a chair using your arms (e.g., wheelchair, bedside chair)? 1  -MS     Climbing 3-5 steps with a railing? 1  -MS     To walk in  hospital room? 1  -MS     AM-PAC 6 Clicks Score (PT) 7  -MS     Highest level of mobility 2 --> Bed activities/dependent transfer  -MS     Row Name 03/22/23 1001 03/22/23 0920       Functional Assessment    Outcome Measure Options AM-PAC 6 Clicks Daily Activity (OT)  -RAISSA AM-PAC 6 Clicks Basic Mobility (PT)  -MS          User Key  (r) = Recorded By, (t) = Taken By, (c) = Cosigned By    Initials Name Provider Type    MS Teagan Santos PRANAY, PT Physical Therapist    Manjula Self OT Occupational Therapist                Occupational Therapy Education     Title: PT OT SLP Therapies (In Progress)     Topic: Occupational Therapy (Done)     Point: ADL training (Done)     Description:   Instruct learner(s) on proper safety adaptation and remediation techniques during self care or transfers.   Instruct in proper use of assistive devices.              Learning Progress Summary           Patient Acceptance, E, VU by RAISSA at 3/22/2023 1002                               User Key     Initials Effective Dates Name Provider Type Discipline    RAISSA 01/03/23 -  Manjula Alonso OT Occupational Therapist OT              OT Recommendation and Plan  Planned Therapy Interventions (OT): activity tolerance training, strengthening exercise, patient/caregiver education/training  Therapy Frequency (OT): 3 times/wk  Plan of Care Review  Plan of Care Reviewed With: patient  Outcome Evaluation: Pt seen for OT eval this date. Pt admitted ith UTI. Sustained recent rib fx from  tranferring her which is limiting participation due to pain. Pt has reoccurring history of hospital, snf stays. Most recently dc from rehab/snf 4 days ago and is now back in hospital. Pt required assist with adls from  and used wheelchair for all fm. Pt declined to participate in any functional mobility this date due to increased pain in side due to rib fx. Pt did participate in grooming sitting with head inclined. Required set up for oral and hair care. B UEs  wfl for ROM. B UE strength 3+/5. Pt demonstrating poor functional endurance, strength below basline which is impacting abilty to perform functional activities. Will benefit from acute OT to address. Pt will likely need snf, hh at dc     Time Calculation:    Time Calculation- OT     Row Name 03/22/23 1002             Time Calculation- OT    OT Start Time 0904  -KB      OT Stop Time 0918  -KB      OT Time Calculation (min) 14 min  -KB      Total Timed Code Minutes- OT 8 minute(s)  -KB      OT Received On 03/22/23  -KB      OT - Next Appointment 03/24/23  -KB      OT Goal Re-Cert Due Date 04/05/23  -KB         Timed Charges    24573 - OT Therapeutic Activity Minutes 8  -KB         Untimed Charges    OT Eval/Re-eval Minutes 6  -KB         Total Minutes    Timed Charges Total Minutes 8  -KB      Untimed Charges Total Minutes 6  -KB       Total Minutes 14  -KB            User Key  (r) = Recorded By, (t) = Taken By, (c) = Cosigned By    Initials Name Provider Type    KB Manjula Alonso OT Occupational Therapist              Therapy Charges for Today     Code Description Service Date Service Provider Modifiers Qty    [id number]  OT THERAPEUTIC ACT EA 15 MIN 3/22/2023 Manjula Alonso OT GO 1    97002473962 HC OT EVAL MOD COMPLEXITY 2 3/22/2023 Manjula Alonso OT GO 1               Manjula Alonso OT  3/22/2023

## 2023-03-22 NOTE — CASE MANAGEMENT/SOCIAL WORK
Discharge Planning Assessment  [facility]     Patient Name: Zaina Martinez  MRN: [id number]  Today's Date: 3/22/2023    Admit Date: 3/20/2023    Plan: Return home with family assist. Referral pending to palliative/[facility]   Discharge Needs Assessment    No documentation.                Discharge Plan     Row Name 03/22/23 1641       Plan    Plan Return home with family assist. Referral pending to palliative/[facility]    Patient/Family in Agreement with Plan yes    Plan Comments With patient's consent, outbound call to spouse Marv to discuss discharge planning. Marv confirms plans for patient to return home at discharge. Marv inquires about a referral to [facility] or palliative care to learn more about the programs and supports offered. Referral pending and [place]/palliative notified. Marv states patient lives with him and their two grandchildren ages 10 and 12 that Marv and his elderly parents care for. Marv works Monday-Friday 9-6 but comes home during lunch and the afternoons to care for patient. She is wheelchair bound and dependent on Marv for care. CCP discussed option of [employer] and Marv is interested in pricing information. CCP inquired about a PCP follow up and Marv states he prefers a company that can see patient in the home. If patient and family do not proceed with palliative/[facility] referral, a referral can be made to [facility]. CCP did discuss option of LTC medicaid at a HD facility, but spouse declines at this time. Referral pending to palliative care. At this time, plan remains to return home. CCP following clinical course to assist with discharge planning needs. Jolene CAMACHO RN/CCP              Continued Care and Services - Admitted Since 3/20/2023     Destination     Service Provider Request Status Selected Services Address Phone Fax Patient Preferred    [facility] Declined  Cannot meet patient's needs N/A [address] [address]  [phone] --            Selected Continued Care - Prior Encounters Includes continued care and service providers with selected services from prior encounters from 12/20/2022 to 3/22/2023    Discharged on 3/11/2023 Admission date: 3/4/2023 - Discharge disposition: Rehab Facility or Unit (DC - External)    Destination     Service Provider Selected Services Address Phone Fax Patient Preferred    [facility] [address] [facility] [address] [phone] --                Discharged on 2/11/2023 Admission date: 2/1/2023 - Discharge disposition: Skilled Nursing Facility (DC - External)    Destination     Service Provider Selected Services Address Phone Fax Patient Preferred    [facility] [address] [place] [id number] [phone] --                Discharged on 1/29/2023 Admission date: 12/26/2022 - Discharge disposition: [place]Health Care [facility]    Home Medical Care     Service Provider Selected Services Address Phone Fax Patient Preferred    [facility] [address] [facility] [address] [id number] [phone] --                    Expected Discharge Date and Time     Expected Discharge Date Expected Discharge Time    Mar 24, 2023          Demographic Summary    No documentation.                Functional Status    No documentation.                Psychosocial    No documentation.                Abuse/Neglect    No documentation.                Legal    No documentation.                Substance Abuse    No documentation.                Patient Forms    No documentation.                   Tesha Brooks

## 2023-03-22 NOTE — PROGRESS NOTES
"Daily progress note    Primary care physician      Chief complaint  Doing same with no new complaint but still weak    History of present illness  57-year-old white female with very complex past medical history including end-stage renal disease on hemodialysis insulin-dependent diabetes mellitus congestive heart failure hypothyroidism hypertension right MCA stroke and chronic anemia presented to [facility] with altered mental status fever cough and generalized weakness.  Patient evaluated in ER found to have left basilar pneumonia and acute UTI with metabolic encephalopathy admit for management.  Patient able to answer all questions but most of the history obtained from the  old record nursing staff.  Patient has no chest pain increase shortness of breath palpitation nausea vomiting diarrhea.     REVIEW OF SYSTEMS  Per history of present illness    PHYSICAL EXAM  Blood pressure 154/75, pulse 71, temperature 97.7 °F (36.5 °C), temperature source Oral, resp. rate 18, height 157.5 cm (62\"), weight 64.3 kg (141 lb 12.8 oz), SpO2 99 %, not currently breastfeeding.    GENERAL:  Awake and alert ill-appearing lady, in no respiratory distress  HEENT:  Unremarkable  NECK:  Supple  CV: regular rhythm, normal rate, normal pulses  RESPIRATORY: normal effort, breath sounds diminished at the bases, few scattered rhonchi noted, a couple of nonproductive cough noted during exam.  ABDOMEN: soft nontender in all quadrants  MUSCULOSKELETAL: no deformity, no asymmetry  NEURO: alert, moves all extremities, follows commands, diffuse mild weakness to all extremities but no obvious focal motor deficit pattern evident.  PSYCH:  calm, cooperative  SKIN: warm, dry     LAB RESULTS  Lab Results (last 24 hours)     Procedure Component Value Units Date/Time    Urine Culture - Urine, Straight Cath [[id number]]  (Normal) Collected: 03/21/23 1458    Specimen: Urine from Straight Cath Updated: 03/22/23 1255     Urine Culture No " growth    Blood Culture - Blood, Arm, Right [[id number]]  (Normal) Collected: 03/20/23 1143    Specimen: Blood from Arm, Right Updated: 03/22/23 1200     Blood Culture No growth at 2 days    Blood Culture - Blood, Arm, Left [[id number]]  (Normal) Collected: 03/20/23 1143    Specimen: Blood from Arm, Left Updated: 03/22/23 1200     Blood Culture No growth at 2 days    POC Glucose Once [[id number]]  (Abnormal) Collected: 03/22/23 1102    Specimen: Blood Updated: 03/22/23 1104     Glucose 136 mg/dL      Comment: Meter: [id number] : [id number] Abraham SAAVEDRA       Basic Metabolic Panel [[id number]]  (Abnormal) Collected: 03/22/23 0403    Specimen: Blood Updated: 03/22/23 0642     Glucose 129 mg/dL      BUN 19 mg/dL      Creatinine 1.92 mg/dL      Sodium 133 mmol/L      Potassium 3.4 mmol/L      Chloride 98 mmol/L      CO2 26.0 mmol/L      Calcium 7.7 mg/dL      BUN/Creatinine Ratio 9.9     Anion Gap 9.0 mmol/L      eGFR 30.1 mL/min/1.73     Narrative:      GFR Normal >60  Chronic Kidney Disease <60  Kidney Failure <15      POC Glucose Once [[id number]]  (Normal) Collected: 03/22/23 0635    Specimen: Blood Updated: 03/22/23 0637     Glucose 124 mg/dL      Comment: Meter: [id number] : [id number] Harpreet Gabriel CNA       CBC & Differential [[id number]]  (Abnormal) Collected: 03/22/23 0403    Specimen: Blood Updated: 03/22/23 0512    Narrative:      The following orders were created for panel order CBC & Differential.  Procedure                               Abnormality         Status                     ---------                               -----------         ------                     CBC Auto Differential[[id number]]        Abnormal            Final result                 Please view results for these tests on the individual orders.    CBC Auto Differential [[id number]]  (Abnormal) Collected: 03/22/23 0403    Specimen: Blood Updated: 03/22/23 0512     WBC 11.68 10*3/mm3      RBC 3.39 10*6/mm3      Hemoglobin  9.2 g/dL      Hematocrit 28.3 %      MCV 83.5 fL      MCH 27.1 pg      MCHC 32.5 g/dL      RDW 16.7 %      RDW-SD 51.2 fl      MPV 9.6 fL      Platelets 357 10*3/mm3      Neutrophil % 84.8 %      Lymphocyte % 7.4 %      Monocyte % 6.4 %      Eosinophil % 0.6 %      Basophil % 0.3 %      Immature Grans % 0.5 %      Neutrophils, Absolute 9.89 10*3/mm3      Lymphocytes, Absolute 0.87 10*3/mm3      Monocytes, Absolute 0.75 10*3/mm3      Eosinophils, Absolute 0.07 10*3/mm3      Basophils, Absolute 0.04 10*3/mm3      Immature Grans, Absolute 0.06 10*3/mm3      nRBC 0.0 /100 WBC     POC Glucose Once [[id number]]  (Abnormal) Collected: 03/21/23 2124    Specimen: Blood Updated: 03/21/23 2126     Glucose 174 mg/dL      Comment: Meter: [id number] : [id number] Harpreet Gabriel CNA       POC Glucose Once [[id number]]  (Normal) Collected: 03/21/23 1601    Specimen: Blood Updated: 03/21/23 1606     Glucose 114 mg/dL      Comment: Meter: [id number] : [id number] Cherrie SAAVEDRA           Imaging Results (Last 24 Hours)     Procedure Component Value Units Date/Time    XR Ribs Bilateral 3 View [[id number]] Collected: 03/21/23 1738     Updated: 03/21/23 1826    Narrative:      Bilateral rib x-rays     HISTORY: Possible rib fractures.     TECHNIQUE: Total of 11 x-rays of the chest and bilateral thoracic cage  were acquired and are correlated with chest CT acquired yesterday which  does not show the entire caudad extent of the ribs.     FINDINGS: A moderately large left pleural effusion is again observed. A  small right pleural effusion seen on CT is not evident on this series.  There is a right IJ dialysis catheter as well as some vascular occlusion  coils projecting over the abdomen. There are clips from cholecystectomy.  Nondisplaced anterolateral left ninth rib fracture appears acute. Eighth  rib fracture is above that level is partially demonstrated on the CT and  is old. RPO oblique views show subtle irregularity  along the anterior  right sixth rib which appears to be an acute fracture. No other definite  rib fracture identified. No bone lesion.       Impression:      Acute right sixth and left eighth rib fractures anteriorly.  Large left pleural effusion is seen on CT yesterday.     This report was finalized on 3/21/2023 6:23 PM by Dr. Adonis Burch M.D.           ECG 12 Lead             Component  Ref Range & Units 11:12  (3/20/23) 1 mo ago  (2/5/23) 1 mo ago  (1/22/23) 1 mo ago  (1/22/23) 2 mo ago  (1/1/23) 2 mo ago  (12/26/22) 3 mo ago  (12/4/22)   QT Interval  ms 415 P  418  391  372  397  514  490    Resulting Agency BH ECG BH ECG BH ECG BH ECG BH ECG BH ECG BH ECG             HEART RATE= 71  bpm  RR Interval= 845  ms  MA Interval= 160  ms  P Horizontal Axis= 61  deg  P Front Axis= 49  deg  QRSD Interval= 101  ms  QT Interval= 415  ms  QRS Axis= -33  deg  T Wave Axis= 43  deg  - OTHERWISE NORMAL ECG -  Sinus rhythm  Left axis deviation  Baseline wander in lead(s) I,II,III,aVR,aVF,V1,V4,V5             Current Facility-Administered Medications:   •  acetaminophen (TYLENOL) tablet 650 mg, 650 mg, Oral, Q6H PRN, Dimitri Pena MD, 650 mg at 03/20/23 1757  •  amLODIPine (NORVASC) tablet 10 mg, 10 mg, Oral, Q24H, Dimitri Pena MD, 10 mg at 03/22/23 0839  •  aspirin EC tablet 81 mg, 81 mg, Oral, Daily, Dimitri Pena MD, 81 mg at 03/22/23 0839  •  azithromycin (ZITHROMAX) 500 mg in sodium chloride 0.9 % 250 mL IVPB-VTB, 500 mg, Intravenous, Q24H, Dimitri Pena MD, 500 mg at 03/21/23 1629  •  carvedilol (COREG) tablet 3.125 mg, 3.125 mg, Oral, BID With Meals, Dimitri Pena MD, 3.125 mg at 03/22/23 0839  •  cefTRIAXone (ROCEPHIN) 1 g in sodium chloride 0.9 % 100 mL IVPB-VTB, 1 g, Intravenous, Q24H, Dimitri Pena MD, Last Rate: 200 mL/hr at 03/21/23 1629, 1 g at 03/21/23 1629  •  Desvenlafaxine Succinate ER 25 mg, 25 mg, Oral, Daily, Dimitri Pena MD, 25 mg at 03/22/23 0839  •  folic acid (FOLVITE) tablet 1 mg, 1 mg, Oral,  Daily, Dimitri Pena MD, 1 mg at 03/22/23 0839  •  heparin (porcine) injection 4,000 Units, 4,000 Units, Intracatheter, PRN, Alejo Lange MD, 4,000 Units at 03/21/23 1025  •  HYDROcodone-acetaminophen (NORCO) 5-325 MG per tablet 1 tablet, 1 tablet, Oral, Q4H PRN, Dimitri Pena MD, 1 tablet at 03/22/23 1527  •  insulin lispro (ADMELOG) injection 0-7 Units, 0-7 Units, Subcutaneous, 4x Daily With Meals & Nightly, Dimitri Pena MD, 2 Units at 03/20/23 2206  •  levETIRAcetam (KEPPRA) tablet 250 mg, 250 mg, Oral, BID, Dimitri Pena MD, 250 mg at 03/22/23 0839  •  levothyroxine (SYNTHROID, LEVOTHROID) tablet 300 mcg, 300 mcg, Oral, Daily, Dimitri Pena MD, 300 mcg at 03/22/23 0839  •  lidocaine (LIDODERM) 5 % 1 patch, 1 patch, Transdermal, Q24H, Ruth Lira MD, 1 patch at 03/22/23 1521  •  lidocaine (LIDODERM) 5 % 1 patch, 1 patch, Transdermal, Q24H, Ruth Lira MD, 1 patch at 03/22/23 1521  •  melatonin tablet 3 mg, 3 mg, Oral, Nightly PRN, Dimitri Pena MD  •  pantoprazole (PROTONIX) EC tablet 40 mg, 40 mg, Oral, Daily, Dimitri Pena MD, 40 mg at 03/22/23 0839  •  rOPINIRole (REQUIP) tablet 2 mg, 2 mg, Oral, Nightly, Dimitri Pena MD, 2 mg at 03/21/23 2101  •  sennosides-docusate (PERICOLACE) 8.6-50 MG per tablet 2 tablet, 2 tablet, Oral, Nightly PRN, Dimitri Pena MD  •  [COMPLETED] Insert Peripheral IV, , , Once **AND** sodium chloride 0.9 % flush 10 mL, 10 mL, Intravenous, PRN, Boelus, Andrea A, MD    ASSESSMENT  Left basilar pneumonia  Acute UTI  Metabolic encephalopathy  Worsening anemia  End-stage renal disease on hemodialysis  Right MCA CVA  Diastolic congestive heart failure  Insulin-dependent diabetes mellitus  Hypertension   Hyperlipidemia  Hypothyroidism  Seizure disorder  Gastroesophageal reflux disease    PLAN  CPM  Supplemental oxygen nebulizer  Continue IV antibiotics   Hemodialysis TIW  Transfuse as needed  Adjust nursing home medications  Stress ulcer DVT  prophylaxis  Infectious disease to follow patient  Supportive care  DNR  PT OT  Discussed with family and nursing staff  Discharge planning    TERRELL DELVALLE MD    Copied text in this note has been reviewed and is accurate as of 03/22/23

## 2023-03-22 NOTE — THERAPY EVALUATION
Patient Name: Zaina Martinez  : 1965    MRN: [id number]                              Today's Date: 3/22/2023       Admit Date: 3/20/2023    Visit Dx:     ICD-10-CM ICD-9-CM   1. Urinary tract infection without hematuria, site unspecified  N39.0 599.0   2. Pneumonia of left lower lobe due to infectious organism  J18.9 486   3. Altered mental status, unspecified altered mental status type  R41.82 780.97   4. End stage renal disease ([facility])  N18.6 585.6   5. Anemia due to chronic kidney disease, on chronic dialysis ([facility])  N18.6 585.6    D63.1 285.21    Z99.2 V45.11   6. Pleural effusion  J90 511.9   7. Hypoxia  R09.02 799.02     Patient Active Problem List   Diagnosis   • Renal insufficiency   • Hypertensive disorder   • Hypothyroidism   • Type 2 diabetes mellitus with kidney complication, with long-term current use of insulin ([facility])   • Rheumatoid arthritis ([facility])   • Angioedema   • Esophageal dysmotility   • Anemia   • Medically noncompliant   • Myocardial infarction due to demand ischemia ([facility])   • Enteritis   • PRES (posterior reversible encephalopathy syndrome)   • Urine retention   • Klebsiella infection   • Superficial thrombophlebitis   • Generalized weakness   • ESRD (end stage renal disease) ([facility])   • CAD (coronary artery disease)   • Abnormal urinalysis   • Chronic diastolic CHF (congestive heart failure) ([facility])   • Pyelonephritis   • Calculus of gallbladder with acute on chronic cholecystitis without obstruction   • Pleural effusion on right   • Anemia due to chronic kidney disease, on chronic dialysis ([facility])   • Abnormal findings on diagnostic imaging of other specified body structures   • Acute upper respiratory infection   • Agitation   • Alkaline phosphatase raised   • Casts present in urine   • Cellulitis of toe   • Hip pain   • Community acquired pneumonia   • Depressive disorder   • Diarrhea of presumed infectious origin   • Difficult or painful urination   • Disease due to severe acute respiratory  syndrome coronavirus 2 (SARS-CoV-2)   • Dyspnea   • Encounter for follow-up examination after completed treatment for conditions other than malignant neoplasm   • H/O: hypothyroidism   • Hyperlipidemia   • Hypomagnesemia   • Intractable vomiting with nausea   • Leukocytosis   • Luetscher's syndrome   • Need for influenza vaccination   • Restless legs   • Noncompliance with treatment   • Shoulder pain   • Acute UTI (urinary tract infection)   • Metabolic encephalopathy   • Abnormal findings on diagnostic imaging of abdomen   • Status post cholecystectomy   • Hyponatremia   • Acute metabolic encephalopathy   • Encephalopathy, toxic   • Acute CVA (cerebrovascular accident) ([facility])   • History of intracranial hemorrhage   • Stroke (HCC)   • Abnormal urinalysis   • Diabetic muscle infarction ([facility])   • Other insomnia   • Altered mental status   • History of stroke- R MCA s/p TPA with subsequent ICH with debility   • Heart murmur   • Bradycardia   • Left lower lobe pneumonia   • Metabolic encephalopathy   • Pericardial effusion   • Pleural effusion   • Acute pulmonary edema ([facility])   • Atrial flutter ([facility])   • Chronic systolic CHF (congestive heart failure) ([facility])   • Thrombus of venous dialysis catheter ([facility])   • Sepsis without acute organ dysfunction ([facility])   • Type 2 diabetes mellitus with hyperglycemia, with long-term current use of insulin ([facility])   • Acute respiratory failure with hypoxia ([facility])   • Acute on chronic systolic CHF (congestive heart failure) ([facility])   • Hyperkalemia   • Acute respiratory failure with hypoxia ([facility])   • Other hypervolemia   • Hematoma of right hip, initial encounter   • Ureteral stent present   • Closed intertrochanteric fracture of right femur ([facility])   • Witnessed seizure-like activity ([facility])   • Acute respiratory failure with hypercapnia ([facility])   • Opioid use   • Diabetic muscle infarction ([facility])   • Acute posthemorrhagic anemia   • Kidney hematoma   • Pyuria   • Severe malnutrition ([facility])   • Moderate  malnutrition (HCC)   • Hypophosphatemia   • Urinary tract infection without hematuria, site unspecified     Past Medical History:   Diagnosis Date   • Acute CVA (cerebrovascular accident) (HCC) 05/01/2022   • Acute on chronic diastolic CHF (congestive heart failure) (HCC)    • Anemia    • CAD (coronary artery disease) 12/20/2021   • Diabetes (HCC)    • Disease of thyroid gland    • GERD (gastroesophageal reflux disease)    • History of intracranial hemorrhage 5/1/2022   • Hyperlipidemia 11/30/2018   • Hypertension    • Rheumatoid arthritis (HCC)    • Stage 5 chronic kidney disease (HCC)      Past Surgical History:   Procedure Laterality Date   • CHOLECYSTECTOMY WITH INTRAOPERATIVE CHOLANGIOGRAM N/A 1/10/2022    Procedure: Laparoscopic cholecystectomy with intraoperative cholangiogram;  Surgeon: Ramana Raygoza MD;  Location: [facility];  Service: General;  Laterality: N/A;   • CYSTOSCOPY W/ URETERAL STENT PLACEMENT Left 9/29/2022    Procedure: CYSTOSCOPY URETERAL STENT INSERTION WITH RETROGRADE PYELOGRAM;  Surgeon: Bryant Phan Jr., MD;  Location: [facility];  Service: Urology;  Laterality: Left;   • CYSTOSCOPY W/ URETERAL STENT PLACEMENT Left 1/10/2023    Procedure: CYSTOSCOPY LEFT STENT REMOVAL;  Surgeon: Bryant Phan Jr., MD;  Location: [facility] OR;  Service: Urology;  Laterality: Left;   • EMBOLIZATION MESENTERIC ARTERY N/A 1/1/2023    Procedure: LEFT KIDNEY EMBOLIZATION;  Surgeon: Bijan Ordoñez MD;  Location: [facility] 18/19;  Service: Interventional Radiology;  Laterality: N/A;   • EYE SURGERY     • FEMUR IM NAILING/RODDING Right 11/10/2022    Procedure: FEMUR INTRAMEDULLARY NAILING/RODDING;  Surgeon: Glen Pierce MD;  Location: [facility] OR;  Service: Orthopedics;  Laterality: Right;   • HIP INTERTROCHANTERIC NAILING Right 11/10/2022    Procedure: HIP INTERTROCHANTERIC NAILING;  Surgeon: Glen Pierce MD;  Location: [facility] OR;  Service:  Orthopedics;  Laterality: Right;   • HYSTERECTOMY     • INSERT CENTRAL LINE AT BEDSIDE  12/31/2022        • INSERTION HEMODIALYSIS CATHETER N/A 12/6/2021    Procedure: HEMODIALYSIS CATHETER INSERTION;  Surgeon: Keli Salazar MD;  Location: [facility] OR 18/19;  Service: Vascular;  Laterality: N/A;   • INSERTION HEMODIALYSIS CATHETER N/A 5/3/2022    Procedure: TUNNELED CATHETER PLACEMENT;  Surgeon: Keli Salazar MD;  Location: [facility] OR;  Service: Vascular;  Laterality: N/A;   • MUSCLE BIOPSY Left 6/15/2022    Procedure: Left quadriceps muscle biopsy;  Surgeon: Marques Ma MD;  Location: [facility] OR;  Service: Neurosurgery;  Laterality: Left;   • URETEROSCOPY LASER LITHOTRIPSY WITH STENT INSERTION Left 12/30/2022    Procedure: CYSTOSCOPY WITH LEFT  URETEROSCOPY  LEFT STENT EXCHANGE;  Surgeon: Bryant Phan Jr., MD;  Location: [facility] OR;  Service: Urology;  Laterality: Left;      General Information     Row Name 03/22/23 0914          Physical Therapy Time and Intention    Document Type evaluation  -MS     Mode of Treatment physical therapy  -MS     Row Name 03/22/23 0914          General Information    Patient Profile Reviewed yes  -MS     Prior Level of Function min assist:;mod assist:;bed mobility;transfer;w/c or scooter  Primarily nonambulatory with use of wc, patient reported spouse or family assist with transfers at all times, multiple pieces of equipment at home  -MS     Existing Precautions/Restrictions fall  -MS     Barriers to Rehab medically complex;previous functional deficit  -MS     Row Name 03/22/23 0914          Living Environment    People in Home spouse  -MS     Row Name 03/22/23 0914          Home Main Entrance    Number of Stairs, Main Entrance other (see comments)  wheelchair accessible home per patient report.  -MS     Row Name 03/22/23 0914          Cognition    Orientation Status (Cognition) oriented to;person;place;time  Tearful throughout entire  evaluation, flat affect and appeared depressed  -MS     Row Name 03/22/23 0914          Safety Issues, Functional Mobility    Safety Issues Affecting Function (Mobility) insight into deficits/self-awareness;judgment  -MS     Impairments Affecting Function (Mobility) strength;pain;endurance/activity tolerance  -MS     Comment, Safety Issues/Impairments (Mobility) Non skid socks donned.  -MS           User Key  (r) = Recorded By, (t) = Taken By, (c) = Cosigned By    Initials Name Provider Type    MS TomTeagan, PT Physical Therapist               Mobility     Row Name 03/22/23 0916          Bed Mobility    Bed Mobility rolling right;rolling left;scooting/bridging  -MS     Rolling Left [place] (Bed Mobility) maximum assist (25% patient effort);nonverbal cues (demo/gesture);verbal cues  -MS     Rolling Right [place] (Bed Mobility) maximum assist (25% patient effort);verbal cues;nonverbal cues (demo/gesture)  -MS     Scooting/Bridging [place] (Bed Mobility) maximum assist (25% patient effort);dependent (less than 25% patient effort);2 person assist;verbal cues;nonverbal cues (demo/gesture)  -MS     Comment, (Bed Mobility) Declined achieving EOB due to fatigue and pain in flank region, attempted to encourage but unsuccessful. At length time spent repositioning patient for comfort- nsg aide assisted.  -MS     Row Name 03/22/23 0916          Transfers    Comment, (Transfers) Unable to assess this date.  -MS           User Key  (r) = Recorded By, (t) = Taken By, (c) = Cosigned By    Initials Name Provider Type    MS CastillosTeagan, PT Physical Therapist               Obj/Interventions     Row Name 03/22/23 0917          Range of Motion Comprehensive    Comment, General Range of Motion B LEs grossly 75% WFL  -MS     Row Name 03/22/23 0917          Strength Comprehensive (MMT)    Comment, General Manual Muscle Testing (MMT) Assessment Generalized weakness, grossly 3-/5  -MS     Row Name 03/22/23 0917           Balance    Balance Assessment sitting static balance  -MS     Static Sitting Balance unable to assess  -MS     Row Name 03/22/23 0917          Sensory Assessment (Somatosensory)    Sensory Assessment (Somatosensory) unable/difficult to assess  -MS           User Key  (r) = Recorded By, (t) = Taken By, (c) = Cosigned By    Initials Name Provider Type    Teagan Mckeon PRANAY, PT Physical Therapist               Goals/Plan     Row Name 03/22/23 0919          Bed Mobility Goal 1 (PT)    Activity/Assistive Device (Bed Mobility Goal 1, PT) bed mobility activities, all  -MS     [place] Level/Cues Needed (Bed Mobility Goal 1, PT) minimum assist (75% or more patient effort)  -MS     Time Frame (Bed Mobility Goal 1, PT) 2 weeks  -MS     Row Name 03/22/23 0919          Transfer Goal 1 (PT)    Activity/Assistive Device (Transfer Goal 1, PT) bed-to-chair/chair-to-bed  -MS     [place] Level/Cues Needed (Transfer Goal 1, PT) minimum assist (75% or more patient effort)  -MS     Time Frame (Transfer Goal 1, PT) 2 weeks  -MS     Row Name 03/22/23 0919          Therapy Assessment/Plan (PT)    Planned Therapy Interventions (PT) balance training;bed mobility training;gait training;home exercise program;patient/family education;postural re-education;ROM (range of motion);stair training;strengthening;transfer training  -MS           User Key  (r) = Recorded By, (t) = Taken By, (c) = Cosigned By    Initials Name Provider Type    Teagan Mckeon PRANAY, PT Physical Therapist               Clinical Impression     Row Name 03/22/23 0918          Pain    Pretreatment Pain Rating 9/10  -MS     Posttreatment Pain Rating 9/10  -MS     Pain Location anterior  -MS     Pain Location - flank  -MS     Pain Intervention(s) Nursing Notified;Repositioned;Rest  -MS     Row Name 03/22/23 0918          Therapy Assessment/Plan (PT)    Rehab Potential (PT) fair, will monitor progress closely  -MS     Criteria for Skilled Interventions Met  (PT) yes;meets criteria  -MS     Therapy Frequency (PT) 3 times/wk  -MS     Row Name 03/22/23 0918          Vital Signs    Pre SpO2 (%) 95  -MS     O2 Delivery Pre Treatment room air  -MS     O2 Delivery Intra Treatment room air  -MS     O2 Delivery Post Treatment room air  -MS     Row Name 03/22/23 0918          Positioning and Restraints    Pre-Treatment Position in bed  -MS     Post Treatment Position bed  -MS     In Bed fowlers;call light within reach;encouraged to call for assist;exit alarm on;with nsg;SCD pump applied  -MS           User Key  (r) = Recorded By, (t) = Taken By, (c) = Cosigned By    Initials Name Provider Type    Teagan Mckeon PT Physical Therapist               Outcome Measures     Row Name 03/22/23 0920          How much help from another person do you currently need...    Turning from your back to your side while in flat bed without using bedrails? 2  -MS     Moving from lying on back to sitting on the side of a flat bed without bedrails? 1  -MS     Moving to and from a bed to a chair (including a wheelchair)? 1  -MS     Standing up from a chair using your arms (e.g., wheelchair, bedside chair)? 1  -MS     Climbing 3-5 steps with a railing? 1  -MS     To walk in hospital room? 1  -MS     AM-PAC 6 Clicks Score (PT) 7  -MS     Highest level of mobility 2 --> Bed activities/dependent transfer  -MS     Row Name 03/22/23 0920          Functional Assessment    Outcome Measure Options AM-PAC 6 Clicks Basic Mobility (PT)  -MS           User Key  (r) = Recorded By, (t) = Taken By, (c) = Cosigned By    Initials Name Provider Type    Teagan Mckeon PT Physical Therapist                             Physical Therapy Education     Title: PT OT SLP Therapies (In Progress)     Topic: Physical Therapy (In Progress)     Point: Mobility training (Done)     Learning Progress Summary           Patient Acceptance, E,TB, VU by MS at 3/22/2023 0920                   Point: Home exercise program (Not  Started)     Learner Progress:  Not documented in this visit.          Point: Body mechanics (Not Started)     Learner Progress:  Not documented in this visit.          Point: Precautions (Not Started)     Learner Progress:  Not documented in this visit.                      User Key     Initials Effective Dates Name Provider Type Discipline    MS 06/16/21 -  Teagan Santos PT Physical Therapist PT              PT Recommendation and Plan  Planned Therapy Interventions (PT): balance training, bed mobility training, gait training, home exercise program, patient/family education, postural re-education, ROM (range of motion), stair training, strengthening, transfer training        Time Calculation:    PT Charges     Row Name 03/22/23 0912             Time Calculation    Start Time 0824  -MS      Stop Time 0835  -MS      Time Calculation (min) 11 min  -MS      PT Received On 03/22/23  -MS      PT - Next Appointment 03/24/23  -MS      PT Goal Re-Cert Due Date 04/05/23  -MS            User Key  (r) = Recorded By, (t) = Taken By, (c) = Cosigned By    Initials Name Provider Type    MS SantosTeagan, PT Physical Therapist              Therapy Charges for Today     Code Description Service Date Service Provider Modifiers Qty    [id number]  PT EVAL MOD COMPLEXITY 3 3/22/2023 Teagan Santos, PT GP 1          PT G-Codes  Outcome Measure Options: AM-PAC 6 Clicks Basic Mobility (PT)  AM-PAC 6 Clicks Score (PT): 7  PT Discharge Summary  Anticipated Discharge Disposition (PT): home with 24/7 care, home with assist, home with home health (pending progress made)    Teagan Santos, PT  3/22/2023

## 2023-03-23 LAB
ALBUMIN SERPL-MCNC: 2.3 G/DL (ref 3.5–5.2)
ANION GAP SERPL CALCULATED.3IONS-SCNC: 12 MMOL/L (ref 5–15)
BACTERIA SPEC AEROBE CULT: ABNORMAL
BASOPHILS # BLD AUTO: 0.07 10*3/MM3 (ref 0–0.2)
BASOPHILS NFR BLD AUTO: 0.6 % (ref 0–1.5)
BUN SERPL-MCNC: 24 MG/DL (ref 6–20)
BUN/CREAT SERPL: 10.2 (ref 7–25)
CALCIUM SPEC-SCNC: 8 MG/DL (ref 8.6–10.5)
CHLORIDE SERPL-SCNC: 96 MMOL/L (ref 98–107)
CO2 SERPL-SCNC: 24 MMOL/L (ref 22–29)
CREAT SERPL-MCNC: 2.36 MG/DL (ref 0.57–1)
DEPRECATED RDW RBC AUTO: 51.4 FL (ref 37–54)
EGFRCR SERPLBLD CKD-EPI 2021: 23.5 ML/MIN/1.73
EOSINOPHIL # BLD AUTO: 0.16 10*3/MM3 (ref 0–0.4)
EOSINOPHIL NFR BLD AUTO: 1.3 % (ref 0.3–6.2)
ERYTHROCYTE [DISTWIDTH] IN BLOOD BY AUTOMATED COUNT: 17.1 % (ref 12.3–15.4)
GLUCOSE BLDC GLUCOMTR-MCNC: 115 MG/DL (ref 70–130)
GLUCOSE BLDC GLUCOMTR-MCNC: 116 MG/DL (ref 70–130)
GLUCOSE BLDC GLUCOMTR-MCNC: 184 MG/DL (ref 70–130)
GLUCOSE BLDC GLUCOMTR-MCNC: 86 MG/DL (ref 70–130)
GLUCOSE SERPL-MCNC: 124 MG/DL (ref 65–99)
HCT VFR BLD AUTO: 31.2 % (ref 34–46.6)
HGB BLD-MCNC: 9.8 G/DL (ref 12–15.9)
IMM GRANULOCYTES # BLD AUTO: 0.1 10*3/MM3 (ref 0–0.05)
IMM GRANULOCYTES NFR BLD AUTO: 0.8 % (ref 0–0.5)
LYMPHOCYTES # BLD AUTO: 1.02 10*3/MM3 (ref 0.7–3.1)
LYMPHOCYTES NFR BLD AUTO: 8.2 % (ref 19.6–45.3)
MCH RBC QN AUTO: 26.1 PG (ref 26.6–33)
MCHC RBC AUTO-ENTMCNC: 31.4 G/DL (ref 31.5–35.7)
MCV RBC AUTO: 83.2 FL (ref 79–97)
MONOCYTES # BLD AUTO: 0.74 10*3/MM3 (ref 0.1–0.9)
MONOCYTES NFR BLD AUTO: 5.9 % (ref 5–12)
NEUTROPHILS NFR BLD AUTO: 10.36 10*3/MM3 (ref 1.7–7)
NEUTROPHILS NFR BLD AUTO: 83.2 % (ref 42.7–76)
NRBC BLD AUTO-RTO: 0 /100 WBC (ref 0–0.2)
PHOSPHATE SERPL-MCNC: 2.9 MG/DL (ref 2.5–4.5)
PLATELET # BLD AUTO: 375 10*3/MM3 (ref 140–450)
PMV BLD AUTO: 9.4 FL (ref 6–12)
POTASSIUM SERPL-SCNC: 3.6 MMOL/L (ref 3.5–5.2)
RBC # BLD AUTO: 3.75 10*6/MM3 (ref 3.77–5.28)
SODIUM SERPL-SCNC: 132 MMOL/L (ref 136–145)
WBC NRBC COR # BLD: 12.45 10*3/MM3 (ref 3.4–10.8)

## 2023-03-23 PROCEDURE — 85025 COMPLETE CBC W/AUTO DIFF WBC: CPT | Performed by: HOSPITALIST

## 2023-03-23 PROCEDURE — 25010000002 AZITHROMYCIN PER 500 MG: Performed by: HOSPITALIST

## 2023-03-23 PROCEDURE — 82962 GLUCOSE BLOOD TEST: CPT

## 2023-03-23 PROCEDURE — 25010000002 CEFTRIAXONE PER 250 MG: Performed by: HOSPITALIST

## 2023-03-23 PROCEDURE — 80069 RENAL FUNCTION PANEL: CPT | Performed by: INTERNAL MEDICINE

## 2023-03-23 PROCEDURE — 63710000001 INSULIN LISPRO (HUMAN) PER 5 UNITS: Performed by: HOSPITALIST

## 2023-03-23 RX ORDER — AMOXICILLIN 250 MG/1
500 CAPSULE ORAL EVERY 12 HOURS SCHEDULED
Status: DISCONTINUED | OUTPATIENT
Start: 2023-03-23 | End: 2023-03-24

## 2023-03-23 RX ADMIN — LEVOTHYROXINE SODIUM 300 MCG: 0.15 TABLET ORAL at 13:07

## 2023-03-23 RX ADMIN — LEVETIRACETAM 250 MG: 250 TABLET, FILM COATED ORAL at 20:42

## 2023-03-23 RX ADMIN — LEVETIRACETAM 250 MG: 250 TABLET, FILM COATED ORAL at 13:07

## 2023-03-23 RX ADMIN — INSULIN LISPRO 2 UNITS: 100 INJECTION, SOLUTION INTRAVENOUS; SUBCUTANEOUS at 20:42

## 2023-03-23 RX ADMIN — LIDOCAINE 1 PATCH: 50 PATCH CUTANEOUS at 13:07

## 2023-03-23 RX ADMIN — CARVEDILOL 3.12 MG: 3.12 TABLET, FILM COATED ORAL at 13:07

## 2023-03-23 RX ADMIN — AMLODIPINE BESYLATE 10 MG: 10 TABLET ORAL at 13:07

## 2023-03-23 RX ADMIN — ROPINIROLE 2 MG: 2 TABLET, FILM COATED ORAL at 20:42

## 2023-03-23 RX ADMIN — CEFTRIAXONE SODIUM 1 G: 1 INJECTION, POWDER, FOR SOLUTION INTRAMUSCULAR; INTRAVENOUS at 16:17

## 2023-03-23 RX ADMIN — DESVENLAFAXINE SUCCINATE 25 MG: 25 TABLET, EXTENDED RELEASE ORAL at 13:07

## 2023-03-23 RX ADMIN — HYDROCODONE BITARTRATE AND ACETAMINOPHEN 1 TABLET: 5; 325 TABLET ORAL at 13:04

## 2023-03-23 RX ADMIN — PANTOPRAZOLE SODIUM 40 MG: 40 TABLET, DELAYED RELEASE ORAL at 13:07

## 2023-03-23 RX ADMIN — Medication 1 MG: at 13:07

## 2023-03-23 RX ADMIN — HYDROCODONE BITARTRATE AND ACETAMINOPHEN 1 TABLET: 5; 325 TABLET ORAL at 20:42

## 2023-03-23 RX ADMIN — ASPIRIN 81 MG: 81 TABLET, COATED ORAL at 13:07

## 2023-03-23 RX ADMIN — AZITHROMYCIN DIHYDRATE 500 MG: 500 INJECTION, POWDER, LYOPHILIZED, FOR SOLUTION INTRAVENOUS at 17:14

## 2023-03-23 RX ADMIN — LIDOCAINE 1 PATCH: 50 PATCH CUTANEOUS at 13:09

## 2023-03-23 NOTE — CONSULTS
Called Mr. Martinez to set up a time to meet for a consult today but he works throughout the week. Set up a consult for Carlos 3/26 at 1300.

## 2023-03-23 NOTE — PROGRESS NOTES
"  Infectious Diseases Progress Note         [facility]  [place]: 3 days  Patient Identification:  Name: Zaina Martinez  Age: 57 y.o.  Sex: female  :  1965  MRN: [id number]         Primary Care Physician: Norman Serrato MD        Subjective: feeling better and have some discomfort in the chest and feels weak..  Interval History: See consultation note.    Objective:    Scheduled Meds:amLODIPine, 10 mg, Oral, Q24H  aspirin, 81 mg, Oral, Daily  azithromycin, 500 mg, Intravenous, Q24H  carvedilol, 3.125 mg, Oral, BID With Meals  cefTRIAXone, 1 g, Intravenous, Q24H  Desvenlafaxine Succinate ER, 25 mg, Oral, Daily  folic acid, 1 mg, Oral, Daily  insulin lispro, 0-7 Units, Subcutaneous, 4x Daily With Meals & Nightly  levETIRAcetam, 250 mg, Oral, BID  levothyroxine, 300 mcg, Oral, Daily  lidocaine, 1 patch, Transdermal, Q24H  lidocaine, 1 patch, Transdermal, Q24H  pantoprazole, 40 mg, Oral, Daily  rOPINIRole, 2 mg, Oral, Nightly      Continuous Infusions:     Vital signs in last 24 hours:  Temp:  [97.6 °F (36.4 °C)-98.2 °F (36.8 °C)] 98.2 °F (36.8 °C)  Heart Rate:  [71-79] 79  Resp:  [18] 18  BP: (150-166)/(72-84) 150/77    Intake/Output:    Intake/Output Summary (Last 24 hours) at 3/23/2023 1913  Last data filed at 3/23/2023 1143  Gross per 24 hour   Intake 240 ml   Output 2000 ml   Net -1760 ml       Exam:  /77 (BP Location: Right arm, Patient Position: Lying)   Pulse 79   Temp 98.2 °F (36.8 °C) (Oral)   Resp 18   Ht 157.5 cm (62\")   Wt 64.9 kg (143 lb 1.3 oz)   SpO2 98%   BMI 26.17 kg/m²   Patient is examined using the personal protective equipment as per guidelines from infection control for this particular patient as enacted.  Hand washing was performed before and after patient interaction.  General Appearance:  Chronically ill-appearing female                          Head:    Normocephalic, without obvious abnormality, atraumatic                           Eyes:    URIAH, " conjunctivae/corneas clear, EOM's intact, both eyes                         Throat:   Lips, tongue, gums normal; oral mucosa pink and moist                           Neck:   Supple, symmetrical, trachea midline, no JVD                         Lungs:    No obvious use of accessory muscles of breathing noted decreased breath sounds at the bases.                 Chest Wall:    No tenderness or deformity                          Heart:  S1-S2 irregular                  Abdomen:   Soft nontender                 Extremities:   Extremities normal, atraumatic, no cyanosis or edema                        Pulses:   Pulses palpable in all extremities                            Skin: Bruising and ecchymotic changes noted                  Neurologic: Withdrawn and weak and does not engage.       Data Review:    I reviewed the patient's new clinical results.  Results from last 7 days   Lab Units 03/23/23 0518 03/22/23  0403 03/21/23 0327 03/20/23  1203   WBC 10*3/mm3 12.45* 11.68* 13.93* 13.57*   HEMOGLOBIN g/dL 9.8* 9.2* 9.2* 7.0*   PLATELETS 10*3/mm3 375 357 389 353     Results from last 7 days   Lab Units 03/23/23 0518 03/22/23  0403 03/21/23 0327 03/20/23  1203   SODIUM mmol/L 132* 133* 134* 133*   POTASSIUM mmol/L 3.6 3.4* 4.3 4.2   CHLORIDE mmol/L 96* 98 93* 95*   CO2 mmol/L 24.0 26.0 26.6 25.7   BUN mg/dL 24* 19 39* 33*   CREATININE mg/dL 2.36* 1.92* 3.31* 3.11*   CALCIUM mg/dL 8.0* 7.7* 8.3* 8.2*   GLUCOSE mg/dL 124* 129* 88 214*     Microbiology Results (last 10 days)     Procedure Component Value - Date/Time    Urine Culture - Urine, Straight Cath [[id number]]  (Abnormal)  (Susceptibility) Collected: 03/21/23 1458    Lab Status: Final result Specimen: Urine from Straight Cath Updated: 03/23/23 1022     Urine Culture <25,000 CFU/mL Enterococcus faecalis    Narrative:      Colonization of the urinary tract without infection is common. Treatment is discouraged unless the patient is symptomatic, pregnant, or undergoing  an invasive urologic procedure.    Susceptibility      Enterococcus faecalis      ARSENIO      Ampicillin Susceptible      Levofloxacin Resistant     Nitrofurantoin Susceptible      Tetracycline Resistant     Vancomycin Susceptible                           Blood Culture - Blood, Arm, Right [[id number]]  (Normal) Collected: 03/20/23 1143    Lab Status: Preliminary result Specimen: Blood from Arm, Right Updated: 03/23/23 1200     Blood Culture No growth at 3 days    Blood Culture - Blood, Arm, Left [[id number]]  (Normal) Collected: 03/20/23 1143    Lab Status: Preliminary result Specimen: Blood from Arm, Left Updated: 03/23/23 1200     Blood Culture No growth at 3 days    Respiratory Panel PCR w/COVID-19(SARS-CoV-2) NAZ/SINAI/WES/PAD/COR/MAD/ABIGAIL In-House, NP Swab in UTM/VTM, 3-4 HR TAT - Swab, Nasopharynx [[id number]]  (Normal) Collected: 03/20/23 1122    Lab Status: Final result Specimen: Swab from Nasopharynx Updated: 03/20/23 1359     ADENOVIRUS, PCR Not Detected     Coronavirus 229E Not Detected     Coronavirus HKU1 Not Detected     Coronavirus NL63 Not Detected     Coronavirus OC43 Not Detected     COVID19 Not Detected     Human Metapneumovirus Not Detected     Human Rhinovirus/Enterovirus Not Detected     Influenza A PCR Not Detected     Influenza B PCR Not Detected     Parainfluenza Virus 1 Not Detected     Parainfluenza Virus 2 Not Detected     Parainfluenza Virus 3 Not Detected     Parainfluenza Virus 4 Not Detected     RSV, PCR Not Detected     Bordetella pertussis pcr Not Detected     Bordetella parapertussis PCR Not Detected     Chlamydophila pneumoniae PCR Not Detected     Mycoplasma pneumo by PCR Not Detected    Narrative:      In the setting of a positive respiratory panel with a viral infection PLUS a negative procalcitonin without other underlying concern for bacterial infection, consider observing off antibiotics or discontinuation of antibiotics and continue supportive care. If the respiratory panel is  positive for atypical bacterial infection (Bordetella pertussis, Chlamydophila pneumoniae, or Mycoplasma pneumoniae), consider antibiotic de-escalation to target atypical bacterial infection.    Urine Culture - Urine, Urine, Catheter [[id number]]  (Normal) Collected: 03/20/23 1122    Lab Status: Final result Specimen: Urine, Catheter Updated: 03/21/23 1237     Urine Culture No growth            Assessment:    Urinary tract infection without hematuria, site unspecified  1-systemic sepsis due to:              -pneumonia bilateral with moderate left-sided pleural effusion and mild right-sided pleural effusion with risk of complications of pneumonia such as evolving empyema              -urinary tract infection in the setting of end-stage renal disease and minimal urinary output              -possible line sepsis due to infected hemodialysis catheter  2-end-stage renal disease on hemodialysis  3-reactive diabetes  4-anemia  5-rheumatoid arthritis  6-other diagnoses per primary team.   C&S  E fecalis    Recommendations/Discussions:    PO amoxil for 5 days  Will follow  Thank you  .  Alexei Dunn MD  3/23/2023  19:13 EDT    .

## 2023-03-23 NOTE — PROGRESS NOTES
[facility] Progress Note      Patient Name: Zaina Martinez  : 1965  MRN: [id number]  Primary Care Physician:  Norman Serrato MD  Date of admission: 3/20/2023    Subjective     Interval History:   Follow up ESRD  Seen and examined on HD  Still has significant rib pain bilaterally  Breathing is comfortable if she does nothing; short of breath with movement  Appetite is poor; no N/V    Review of Systems:   As noted above    Objective     Vitals:   Temp:  [97.6 °F (36.4 °C)-98 °F (36.7 °C)] 98 °F (36.7 °C)  Heart Rate:  [71-74] 71  Resp:  [18] 18  BP: (154-166)/(75-84) 166/84  Flow (L/min):  [1-1.5] 1    Intake/Output Summary (Last 24 hours) at 3/23/2023 0956  Last data filed at 3/22/2023 2010  Gross per 24 hour   Intake 240 ml   Output --   Net 240 ml       Physical Exam:    General Appearance: Very Chronically ill and frail.    Qb 400, 152/79, HR 76, fluid removal right 2600 mL  Skin: warm and dry. pale  HEENT:oral mucosa moist.   Neck:RIJ TDC. Tunnel non-tender.   Lungs: decreased bs left flank; not labored on 1 L/min  Heart: RRR, no s3 or rub  Abdomen: soft, nontender, nondistended, very active bs  Extremities: no edema.  Neuro: flat affect. . Moves all 4 ext.    Scheduled Meds:     amLODIPine, 10 mg, Oral, Q24H  aspirin, 81 mg, Oral, Daily  azithromycin, 500 mg, Intravenous, Q24H  carvedilol, 3.125 mg, Oral, BID With Meals  cefTRIAXone, 1 g, Intravenous, Q24H  Desvenlafaxine Succinate ER, 25 mg, Oral, Daily  folic acid, 1 mg, Oral, Daily  insulin lispro, 0-7 Units, Subcutaneous, 4x Daily With Meals & Nightly  levETIRAcetam, 250 mg, Oral, BID  levothyroxine, 300 mcg, Oral, Daily  lidocaine, 1 patch, Transdermal, Q24H  lidocaine, 1 patch, Transdermal, Q24H  pantoprazole, 40 mg, Oral, Daily  rOPINIRole, 2 mg, Oral, Nightly      IV Meds:        Results Reviewed:   I have personally reviewed the results from the time of this admission to 3/23/2023 09:56 EDT     Results from last 7  days   Lab Units 03/23/23  0518 03/22/23  0403 03/21/23  0327 03/20/23  1203   SODIUM mmol/L 132* 133* 134* 133*   POTASSIUM mmol/L 3.6 3.4* 4.3 4.2   CHLORIDE mmol/L 96* 98 93* 95*   CO2 mmol/L 24.0 26.0 26.6 25.7   BUN mg/dL 24* 19 39* 33*   CREATININE mg/dL 2.36* 1.92* 3.31* 3.11*   CALCIUM mg/dL 8.0* 7.7* 8.3* 8.2*   BILIRUBIN mg/dL  --   --  0.4 0.3   ALK PHOS U/L  --   --  133* 137*   ALT (SGPT) U/L  --   --  7 <5   AST (SGOT) U/L  --   --  19 13   GLUCOSE mg/dL 124* 129* 88 214*       Estimated Creatinine Clearance: 23.3 mL/min (A) (by C-G formula based on SCr of 2.36 mg/dL (H)).    Results from last 7 days   Lab Units 03/23/23 0518 03/21/23  0327   MAGNESIUM mg/dL  --  1.8   PHOSPHORUS mg/dL 2.9 4.0             Results from last 7 days   Lab Units 03/23/23 0518 03/22/23  0403 03/21/23  0327 03/20/23  1203   WBC 10*3/mm3 12.45* 11.68* 13.93* 13.57*   HEMOGLOBIN g/dL 9.8* 9.2* 9.2* 7.0*   PLATELETS 10*3/mm3 375 357 389 353             Assessment / Plan     ASSESSMENT:  1. ESRD.  Volume excess with pleural effusion on left; low-normal potassium.  Undergoing HD now  2. Sepsis with multiple sources, PNA , moderate left and small right pleural effusions, TDC, UTI   3. Chronic diastolic heart failure.  4. HTN controlled.   5. Anemia ckd and blood loss. 1 unit  PRBC 3/20.   6. Left renal hematoma after left ureteral stent 12/30/22.  Left renal artery embolization 1/1/23.  Left stent removal 1/10/23.   7. Rib fractures:  right 6, left 8th.  8. Hypothyroid on replacement.   9. Labile DM2    PLAN:  1. Dialysis today on high-potassium bath  2.  Encouraged her to eat      Alejo Lange MD  03/23/23  09:56 EDT    [facility]  [phone]

## 2023-03-23 NOTE — PROGRESS NOTES
"Daily progress note    Primary care physician      Chief complaint  Doing same with no new complaint but still weak and rib cage pain    History of present illness  57-year-old white female with very complex past medical history including end-stage renal disease on hemodialysis insulin-dependent diabetes mellitus congestive heart failure hypothyroidism hypertension right MCA stroke and chronic anemia presented to [facility] with altered mental status fever cough and generalized weakness.  Patient evaluated in ER found to have left basilar pneumonia and acute UTI with metabolic encephalopathy admit for management.  Patient able to answer all questions but most of the history obtained from the  old record nursing staff.  Patient has no chest pain increase shortness of breath palpitation nausea vomiting diarrhea.     REVIEW OF SYSTEMS  Unremarkable except pain and weakness    PHYSICAL EXAM  Blood pressure 166/84, pulse 71, temperature 98 °F (36.7 °C), temperature source Oral, resp. rate 18, height 157.5 cm (62\"), weight 64.9 kg (143 lb 1.3 oz), SpO2 99 %, not currently breastfeeding.    GENERAL:  Awake and alert ill-appearing lady, in no respiratory distress  HEENT:  Unremarkable  NECK:  Supple  CV: regular rhythm, normal rate, normal pulses  RESPIRATORY: normal effort, breath sounds diminished at the bases, few scattered rhonchi noted, a couple of nonproductive cough noted during exam.  ABDOMEN: soft nontender in all quadrants  MUSCULOSKELETAL: no deformity, no asymmetry  NEURO: alert, moves all extremities, follows commands, diffuse mild weakness to all extremities but no obvious focal motor deficit pattern evident.  PSYCH:  calm, cooperative  SKIN: warm, dry     LAB RESULTS  Lab Results (last 24 hours)     Procedure Component Value Units Date/Time    POC Glucose Once [[id number]]  (Normal) Collected: 03/23/23 1208    Specimen: Blood Updated: 03/23/23 1209     Glucose 86 mg/dL      Comment: Meter: " [id number] : [id number] Edgar Sherrill SAAVEDRA       Blood Culture - Blood, Arm, Right [[id number]]  (Normal) Collected: 03/20/23 1143    Specimen: Blood from Arm, Right Updated: 03/23/23 1200     Blood Culture No growth at 3 days    Blood Culture - Blood, Arm, Left [[id number]]  (Normal) Collected: 03/20/23 1143    Specimen: Blood from Arm, Left Updated: 03/23/23 1200     Blood Culture No growth at 3 days    Urine Culture - Urine, Straight Cath [[id number]]  (Abnormal)  (Susceptibility) Collected: 03/21/23 1458    Specimen: Urine from Straight Cath Updated: 03/23/23 1022     Urine Culture <25,000 CFU/mL Enterococcus faecalis    Narrative:      Colonization of the urinary tract without infection is common. Treatment is discouraged unless the patient is symptomatic, pregnant, or undergoing an invasive urologic procedure.    Susceptibility      Enterococcus faecalis      ARSENIO      Ampicillin Susceptible      Levofloxacin Resistant     Nitrofurantoin Susceptible      Tetracycline Resistant     Vancomycin Susceptible                           Renal Function Panel [[id number]]  (Abnormal) Collected: 03/23/23 0518    Specimen: Blood Updated: 03/23/23 0639     Glucose 124 mg/dL      BUN 24 mg/dL      Creatinine 2.36 mg/dL      Sodium 132 mmol/L      Potassium 3.6 mmol/L      Chloride 96 mmol/L      CO2 24.0 mmol/L      Calcium 8.0 mg/dL      Albumin 2.3 g/dL      Phosphorus 2.9 mg/dL      Anion Gap 12.0 mmol/L      BUN/Creatinine Ratio 10.2     eGFR 23.5 mL/min/1.73     Narrative:      GFR Normal >60  Chronic Kidney Disease <60  Kidney Failure <15      POC Glucose Once [[id number]]  (Normal) Collected: 03/23/23 0616    Specimen: Blood Updated: 03/23/23 0617     Glucose 116 mg/dL      Comment: Meter: [id number] : [id number] Carlos SAAVEDRA       CBC & Differential [[id number]]  (Abnormal) Collected: 03/23/23 0518    Specimen: Blood Updated: 03/23/23 0601    Narrative:      The following orders were created for panel  order CBC & Differential.  Procedure                               Abnormality         Status                     ---------                               -----------         ------                     CBC Auto Differential[[id number]]        Abnormal            Final result                 Please view results for these tests on the individual orders.    CBC Auto Differential [[id number]]  (Abnormal) Collected: 03/23/23 0518    Specimen: Blood Updated: 03/23/23 0601     WBC 12.45 10*3/mm3      RBC 3.75 10*6/mm3      Hemoglobin 9.8 g/dL      Hematocrit 31.2 %      MCV 83.2 fL      MCH 26.1 pg      MCHC 31.4 g/dL      RDW 17.1 %      RDW-SD 51.4 fl      MPV 9.4 fL      Platelets 375 10*3/mm3      Neutrophil % 83.2 %      Lymphocyte % 8.2 %      Monocyte % 5.9 %      Eosinophil % 1.3 %      Basophil % 0.6 %      Immature Grans % 0.8 %      Neutrophils, Absolute 10.36 10*3/mm3      Lymphocytes, Absolute 1.02 10*3/mm3      Monocytes, Absolute 0.74 10*3/mm3      Eosinophils, Absolute 0.16 10*3/mm3      Basophils, Absolute 0.07 10*3/mm3      Immature Grans, Absolute 0.10 10*3/mm3      nRBC 0.0 /100 WBC     POC Glucose Once [[id number]]  (Normal) Collected: 03/22/23 2022    Specimen: Blood Updated: 03/22/23 2023     Glucose 111 mg/dL      Comment: Meter: [id number] : [id number] Carlos SAAVEDRA       POC Glucose Once [[id number]]  (Abnormal) Collected: 03/22/23 1619    Specimen: Blood Updated: 03/22/23 1621     Glucose 132 mg/dL      Comment: Meter: [id number] : [id number] Abraham SAAVEDRA           Imaging Results (Last 24 Hours)     ** No results found for the last 24 hours. **        ECG 12 Lead             Component  Ref Range & Units 11:12  (3/20/23) 1 mo ago  (2/5/23) 1 mo ago  (1/22/23) 1 mo ago  (1/22/23) 2 mo ago  (1/1/23) 2 mo ago  (12/26/22) 3 mo ago  (12/4/22)   QT Interval  ms 415 P  418  391  372  397  514  490    Resulting Agency BH ECG BH ECG BH ECG BH ECG BH ECG BH ECG BH ECG              HEART RATE= 71  bpm  RR Interval= 845  ms  AL Interval= 160  ms  P Horizontal Axis= 61  deg  P Front Axis= 49  deg  QRSD Interval= 101  ms  QT Interval= 415  ms  QRS Axis= -33  deg  T Wave Axis= 43  deg  - OTHERWISE NORMAL ECG -  Sinus rhythm  Left axis deviation  Baseline wander in lead(s) I,II,III,aVR,aVF,V1,V4,V5             Current Facility-Administered Medications:   •  acetaminophen (TYLENOL) tablet 650 mg, 650 mg, Oral, Q6H PRN, Dimitri Pena MD, 650 mg at 03/20/23 1757  •  amLODIPine (NORVASC) tablet 10 mg, 10 mg, Oral, Q24H, Dimitri Pena MD, 10 mg at 03/23/23 1307  •  aspirin EC tablet 81 mg, 81 mg, Oral, Daily, Dimitri Pena MD, 81 mg at 03/23/23 1307  •  azithromycin (ZITHROMAX) 500 mg in sodium chloride 0.9 % 250 mL IVPB-VTB, 500 mg, Intravenous, Q24H, Dimitri Pena MD, 500 mg at 03/22/23 1713  •  carvedilol (COREG) tablet 3.125 mg, 3.125 mg, Oral, BID With Meals, Dimitri Pena MD, 3.125 mg at 03/23/23 1307  •  cefTRIAXone (ROCEPHIN) 1 g in sodium chloride 0.9 % 100 mL IVPB-VTB, 1 g, Intravenous, Q24H, Dimitri Pena MD, Last Rate: 200 mL/hr at 03/22/23 1831, 1 g at 03/22/23 1831  •  Desvenlafaxine Succinate ER 25 mg, 25 mg, Oral, Daily, Dimitri Pena MD, 25 mg at 03/23/23 1307  •  folic acid (FOLVITE) tablet 1 mg, 1 mg, Oral, Daily, Dimitri Pena MD, 1 mg at 03/23/23 1307  •  heparin (porcine) injection 4,000 Units, 4,000 Units, Intracatheter, PRN, Alejo Lange MD, 4,000 Units at 03/21/23 1025  •  HYDROcodone-acetaminophen (NORCO) 5-325 MG per tablet 1 tablet, 1 tablet, Oral, Q4H PRN, Dimitri Pena MD, 1 tablet at 03/23/23 1304  •  insulin lispro (ADMELOG) injection 0-7 Units, 0-7 Units, Subcutaneous, 4x Daily With Meals & Nightly, Dimitri Pena MD, 2 Units at 03/20/23 2206  •  levETIRAcetam (KEPPRA) tablet 250 mg, 250 mg, Oral, BID, Dimitri Pena MD, 250 mg at 03/23/23 1307  •  levothyroxine (SYNTHROID, LEVOTHROID) tablet 300 mcg, 300 mcg, Oral, Daily, Dimitri Pena MD, 300 mcg at  03/23/23 1307  •  lidocaine (LIDODERM) 5 % 1 patch, 1 patch, Transdermal, Q24H, Ruth Lira MD, 1 patch at 03/23/23 1309  •  lidocaine (LIDODERM) 5 % 1 patch, 1 patch, Transdermal, Q24H, Ruth Lira MD, 1 patch at 03/23/23 1307  •  melatonin tablet 3 mg, 3 mg, Oral, Nightly PRN, eTrrell Pena MD  •  ondansetron (ZOFRAN) injection 4 mg, 4 mg, Intravenous, Q4H PRN, Terrell Pena MD  •  pantoprazole (PROTONIX) EC tablet 40 mg, 40 mg, Oral, Daily, Terrell Pena MD, 40 mg at 03/23/23 1307  •  rOPINIRole (REQUIP) tablet 2 mg, 2 mg, Oral, Nightly, Terrell Pena MD, 2 mg at 03/22/23 2103  •  sennosides-docusate (PERICOLACE) 8.6-50 MG per tablet 2 tablet, 2 tablet, Oral, Nightly PRN, Terrell Pena MD  •  [COMPLETED] Insert Peripheral IV, , , Once **AND** sodium chloride 0.9 % flush 10 mL, 10 mL, Intravenous, PRN, Andrea Ocampo MD    ASSESSMENT  Left basilar pneumonia  Acute Enterococcus UTI  Metabolic encephalopathy  Worsening anemia  End-stage renal disease on hemodialysis  Right MCA CVA  Diastolic congestive heart failure  Insulin-dependent diabetes mellitus  Hypertension   Hyperlipidemia  Hypothyroidism  Seizure disorder  Gastroesophageal reflux disease    PLAN  CPM  Supplemental oxygen nebulizer  Antibiotics per infectious disease  Hemodialysis TIW  Transfuse as needed  Pain management  Adjust nursing home medications  Stress ulcer DVT prophylaxis  Infectious disease to follow patient  Supportive care  DNR  PT OT  Discussed with family and nursing staff  Discharge planning    TERRELL PENA MD    Copied text in this note has been reviewed and is accurate as of 03/23/23

## 2023-03-23 NOTE — PROGRESS NOTES
"  Infectious Diseases Progress Note    Debbie Quick MD     [facility]  [place]: 2 days  Patient Identification:  Name: Zaina Martinez  Age: 57 y.o.  Sex: female  :  1965  MRN: [id number]         Primary Care Physician: Norman Serrato MD        Subjective: feeling better and have some discomfort in the chest and feels weak..  Interval History: See consultation note.    Objective:    Scheduled Meds:amLODIPine, 10 mg, Oral, Q24H  aspirin, 81 mg, Oral, Daily  azithromycin, 500 mg, Intravenous, Q24H  carvedilol, 3.125 mg, Oral, BID With Meals  cefTRIAXone, 1 g, Intravenous, Q24H  Desvenlafaxine Succinate ER, 25 mg, Oral, Daily  folic acid, 1 mg, Oral, Daily  insulin lispro, 0-7 Units, Subcutaneous, 4x Daily With Meals & Nightly  levETIRAcetam, 250 mg, Oral, BID  levothyroxine, 300 mcg, Oral, Daily  lidocaine, 1 patch, Transdermal, Q24H  lidocaine, 1 patch, Transdermal, Q24H  pantoprazole, 40 mg, Oral, Daily  rOPINIRole, 2 mg, Oral, Nightly      Continuous Infusions:     Vital signs in last 24 hours:  Temp:  [97.6 °F (36.4 °C)-98.7 °F (37.1 °C)] 97.6 °F (36.4 °C)  Heart Rate:  [71-74] 74  Resp:  [18-20] 18  BP: (151-158)/(75-81) 158/81    Intake/Output:    Intake/Output Summary (Last 24 hours) at 3/22/2023 2136  Last data filed at 3/22/2023 2010  Gross per 24 hour   Intake 480 ml   Output --   Net 480 ml       Exam:  /81 (BP Location: Left arm, Patient Position: Lying)   Pulse 74   Temp 97.6 °F (36.4 °C) (Oral)   Resp 18   Ht 157.5 cm (62\")   Wt 64.3 kg (141 lb 12.8 oz)   SpO2 95%   BMI 25.94 kg/m²   Patient is examined using the personal protective equipment as per guidelines from infection control for this particular patient as enacted.  Hand washing was performed before and after patient interaction.  General Appearance:  Chronically ill-appearing female                          Head:    Normocephalic, without obvious abnormality, atraumatic                           Eyes:    PERRL, " conjunctivae/corneas clear, EOM's intact, both eyes                         Throat:   Lips, tongue, gums normal; oral mucosa pink and moist                           Neck:   Supple, symmetrical, trachea midline, no JVD                         Lungs:    No obvious use of accessory muscles of breathing noted decreased breath sounds at the bases.                 Chest Wall:    No tenderness or deformity                          Heart:  S1-S2 irregular                  Abdomen:   Soft nontender                 Extremities:   Extremities normal, atraumatic, no cyanosis or edema                        Pulses:   Pulses palpable in all extremities                            Skin: Bruising and ecchymotic changes noted                  Neurologic: Withdrawn and weak and does not engage.       Data Review:    I reviewed the patient's new clinical results.  Results from last 7 days   Lab Units 03/22/23  0403 03/21/23  0327 03/20/23  1203   WBC 10*3/mm3 11.68* 13.93* 13.57*   HEMOGLOBIN g/dL 9.2* 9.2* 7.0*   PLATELETS 10*3/mm3 357 389 353     Results from last 7 days   Lab Units 03/22/23  0403 03/21/23  0327 03/20/23  1203   SODIUM mmol/L 133* 134* 133*   POTASSIUM mmol/L 3.4* 4.3 4.2   CHLORIDE mmol/L 98 93* 95*   CO2 mmol/L 26.0 26.6 25.7   BUN mg/dL 19 39* 33*   CREATININE mg/dL 1.92* 3.31* 3.11*   CALCIUM mg/dL 7.7* 8.3* 8.2*   GLUCOSE mg/dL 129* 88 214*     Microbiology Results (last 10 days)     Procedure Component Value - Date/Time    Urine Culture - Urine, Straight Cath [[id number]]  (Normal) Collected: 03/21/23 1458    Lab Status: Preliminary result Specimen: Urine from Straight Cath Updated: 03/22/23 1255     Urine Culture No growth    Blood Culture - Blood, Arm, Right [[id number]]  (Normal) Collected: 03/20/23 1143    Lab Status: Preliminary result Specimen: Blood from Arm, Right Updated: 03/22/23 1200     Blood Culture No growth at 2 days    Blood Culture - Blood, Arm, Left [[id number]]  (Normal) Collected: 03/20/23  1143    Lab Status: Preliminary result Specimen: Blood from Arm, Left Updated: 03/22/23 1200     Blood Culture No growth at 2 days    Respiratory Panel PCR w/COVID-19(SARS-CoV-2) NAZ/[facility]/WES/PAD/COR/[facility]IGAIL In-House, NP Swab in UTM/VTM, 3-4 HR TAT - Swab, Nasopharynx [[id number]]  (Normal) Collected: 03/20/23 1122    Lab Status: Final result Specimen: Swab from Nasopharynx Updated: 03/20/23 1359     ADENOVIRUS, PCR Not Detected     Coronavirus 229E Not Detected     Coronavirus HKU1 Not Detected     Coronavirus NL63 Not Detected     Coronavirus OC43 Not Detected     COVID19 Not Detected     Human Metapneumovirus Not Detected     Human Rhinovirus/Enterovirus Not Detected     Influenza A PCR Not Detected     Influenza B PCR Not Detected     Parainfluenza Virus 1 Not Detected     Parainfluenza Virus 2 Not Detected     Parainfluenza Virus 3 Not Detected     Parainfluenza Virus 4 Not Detected     RSV, PCR Not Detected     Bordetella pertussis pcr Not Detected     Bordetella parapertussis PCR Not Detected     Chlamydophila pneumoniae PCR Not Detected     Mycoplasma pneumo by PCR Not Detected    Narrative:      In the setting of a positive respiratory panel with a viral infection PLUS a negative procalcitonin without other underlying concern for bacterial infection, consider observing off antibiotics or discontinuation of antibiotics and continue supportive care. If the respiratory panel is positive for atypical bacterial infection (Bordetella pertussis, Chlamydophila pneumoniae, or Mycoplasma pneumoniae), consider antibiotic de-escalation to target atypical bacterial infection.    Urine Culture - Urine, Urine, Catheter [[id number]]  (Normal) Collected: 03/20/23 1122    Lab Status: Final result Specimen: Urine, Catheter Updated: 03/21/23 1237     Urine Culture No growth            Assessment:    Urinary tract infection without hematuria, site unspecified  1-systemic sepsis due to:              -pneumonia bilateral with  moderate left-sided pleural effusion and mild right-sided pleural effusion with risk of complications of pneumonia such as evolving empyema              -urinary tract infection in the setting of end-stage renal disease and minimal urinary output              -possible line sepsis due to infected hemodialysis catheter  2-end-stage renal disease on hemodialysis  3-reactive diabetes  4-anemia  5-rheumatoid arthritis  6-other diagnoses per primary team.     Recommendations/Discussions:  Continue present antibiotic therapy while closely monitoring her clinical course and culture results.  Supportive care for underlying issues including dialysis support per primary team and nephrology service.  Debbie Quick MD  3/22/2023  21:36 EDT    Parts of this note may be an electronic transcription/translation of spoken language to printed text using the Dragon dictation system.

## 2023-03-24 LAB
ANION GAP SERPL CALCULATED.3IONS-SCNC: 12 MMOL/L (ref 5–15)
BASOPHILS # BLD AUTO: 0.05 10*3/MM3 (ref 0–0.2)
BASOPHILS NFR BLD AUTO: 0.4 % (ref 0–1.5)
BUN SERPL-MCNC: 16 MG/DL (ref 6–20)
BUN/CREAT SERPL: 10 (ref 7–25)
CALCIUM SPEC-SCNC: 8.2 MG/DL (ref 8.6–10.5)
CHLORIDE SERPL-SCNC: 97 MMOL/L (ref 98–107)
CO2 SERPL-SCNC: 20 MMOL/L (ref 22–29)
CREAT SERPL-MCNC: 1.6 MG/DL (ref 0.57–1)
DEPRECATED RDW RBC AUTO: 52.4 FL (ref 37–54)
EGFRCR SERPLBLD CKD-EPI 2021: 37.5 ML/MIN/1.73
EOSINOPHIL # BLD AUTO: 0.07 10*3/MM3 (ref 0–0.4)
EOSINOPHIL NFR BLD AUTO: 0.6 % (ref 0.3–6.2)
ERYTHROCYTE [DISTWIDTH] IN BLOOD BY AUTOMATED COUNT: 17 % (ref 12.3–15.4)
GLUCOSE BLDC GLUCOMTR-MCNC: 120 MG/DL (ref 70–130)
GLUCOSE BLDC GLUCOMTR-MCNC: 130 MG/DL (ref 70–130)
GLUCOSE BLDC GLUCOMTR-MCNC: 158 MG/DL (ref 70–130)
GLUCOSE BLDC GLUCOMTR-MCNC: 179 MG/DL (ref 70–130)
GLUCOSE SERPL-MCNC: 125 MG/DL (ref 65–99)
HCT VFR BLD AUTO: 30.6 % (ref 34–46.6)
HGB BLD-MCNC: 9.5 G/DL (ref 12–15.9)
IMM GRANULOCYTES # BLD AUTO: 0.1 10*3/MM3 (ref 0–0.05)
IMM GRANULOCYTES NFR BLD AUTO: 0.9 % (ref 0–0.5)
LYMPHOCYTES # BLD AUTO: 0.88 10*3/MM3 (ref 0.7–3.1)
LYMPHOCYTES NFR BLD AUTO: 7.7 % (ref 19.6–45.3)
MCH RBC QN AUTO: 26.4 PG (ref 26.6–33)
MCHC RBC AUTO-ENTMCNC: 31 G/DL (ref 31.5–35.7)
MCV RBC AUTO: 85 FL (ref 79–97)
MONOCYTES # BLD AUTO: 0.85 10*3/MM3 (ref 0.1–0.9)
MONOCYTES NFR BLD AUTO: 7.5 % (ref 5–12)
NEUTROPHILS NFR BLD AUTO: 82.9 % (ref 42.7–76)
NEUTROPHILS NFR BLD AUTO: 9.45 10*3/MM3 (ref 1.7–7)
NRBC BLD AUTO-RTO: 0 /100 WBC (ref 0–0.2)
PLATELET # BLD AUTO: 337 10*3/MM3 (ref 140–450)
PMV BLD AUTO: 9.1 FL (ref 6–12)
POTASSIUM SERPL-SCNC: 3.4 MMOL/L (ref 3.5–5.2)
RBC # BLD AUTO: 3.6 10*6/MM3 (ref 3.77–5.28)
SODIUM SERPL-SCNC: 129 MMOL/L (ref 136–145)
WBC NRBC COR # BLD: 11.4 10*3/MM3 (ref 3.4–10.8)

## 2023-03-24 PROCEDURE — 97530 THERAPEUTIC ACTIVITIES: CPT

## 2023-03-24 PROCEDURE — 85025 COMPLETE CBC W/AUTO DIFF WBC: CPT | Performed by: HOSPITALIST

## 2023-03-24 PROCEDURE — 63710000001 INSULIN LISPRO (HUMAN) PER 5 UNITS: Performed by: HOSPITALIST

## 2023-03-24 PROCEDURE — 25010000002 ONDANSETRON PER 1 MG: Performed by: HOSPITALIST

## 2023-03-24 PROCEDURE — 80048 BASIC METABOLIC PNL TOTAL CA: CPT | Performed by: HOSPITALIST

## 2023-03-24 PROCEDURE — 82962 GLUCOSE BLOOD TEST: CPT

## 2023-03-24 RX ORDER — AMOXICILLIN 250 MG/1
500 CAPSULE ORAL EVERY 24 HOURS
Status: COMPLETED | OUTPATIENT
Start: 2023-03-25 | End: 2023-03-28

## 2023-03-24 RX ORDER — POTASSIUM CHLORIDE 750 MG/1
40 TABLET, FILM COATED, EXTENDED RELEASE ORAL ONCE
Status: COMPLETED | OUTPATIENT
Start: 2023-03-24 | End: 2023-03-24

## 2023-03-24 RX ADMIN — AMLODIPINE BESYLATE 10 MG: 10 TABLET ORAL at 12:32

## 2023-03-24 RX ADMIN — CARVEDILOL 3.12 MG: 3.12 TABLET, FILM COATED ORAL at 17:39

## 2023-03-24 RX ADMIN — INSULIN LISPRO 2 UNITS: 100 INJECTION, SOLUTION INTRAVENOUS; SUBCUTANEOUS at 17:39

## 2023-03-24 RX ADMIN — PANTOPRAZOLE SODIUM 40 MG: 40 TABLET, DELAYED RELEASE ORAL at 12:32

## 2023-03-24 RX ADMIN — Medication 1 MG: at 12:32

## 2023-03-24 RX ADMIN — HYDROCODONE BITARTRATE AND ACETAMINOPHEN 1 TABLET: 5; 325 TABLET ORAL at 05:05

## 2023-03-24 RX ADMIN — HYDROCODONE BITARTRATE AND ACETAMINOPHEN 1 TABLET: 5; 325 TABLET ORAL at 20:42

## 2023-03-24 RX ADMIN — CARVEDILOL 3.12 MG: 3.12 TABLET, FILM COATED ORAL at 12:32

## 2023-03-24 RX ADMIN — INSULIN LISPRO 2 UNITS: 100 INJECTION, SOLUTION INTRAVENOUS; SUBCUTANEOUS at 12:34

## 2023-03-24 RX ADMIN — LIDOCAINE 1 PATCH: 50 PATCH CUTANEOUS at 12:36

## 2023-03-24 RX ADMIN — ASPIRIN 81 MG: 81 TABLET, COATED ORAL at 12:35

## 2023-03-24 RX ADMIN — LIDOCAINE 1 PATCH: 50 PATCH CUTANEOUS at 12:32

## 2023-03-24 RX ADMIN — POTASSIUM CHLORIDE 40 MEQ: 750 TABLET, EXTENDED RELEASE ORAL at 20:42

## 2023-03-24 RX ADMIN — ROPINIROLE 2 MG: 2 TABLET, FILM COATED ORAL at 20:42

## 2023-03-24 RX ADMIN — LEVETIRACETAM 250 MG: 250 TABLET, FILM COATED ORAL at 12:32

## 2023-03-24 RX ADMIN — HYDROCODONE BITARTRATE AND ACETAMINOPHEN 1 TABLET: 5; 325 TABLET ORAL at 12:56

## 2023-03-24 RX ADMIN — AMOXICILLIN 500 MG: 250 CAPSULE ORAL at 12:32

## 2023-03-24 RX ADMIN — ONDANSETRON 4 MG: 2 INJECTION INTRAMUSCULAR; INTRAVENOUS at 21:53

## 2023-03-24 RX ADMIN — LEVETIRACETAM 250 MG: 250 TABLET, FILM COATED ORAL at 20:42

## 2023-03-24 RX ADMIN — LEVOTHYROXINE SODIUM 300 MCG: 0.15 TABLET ORAL at 12:32

## 2023-03-24 RX ADMIN — DESVENLAFAXINE SUCCINATE 25 MG: 25 TABLET, EXTENDED RELEASE ORAL at 12:32

## 2023-03-24 NOTE — PROGRESS NOTES
"  Infectious Diseases Progress Note         [facility]  [place]: 4 days  Patient Identification:  Name: Zaina Martinez  Age: 57 y.o.  Sex: female  :  1965  MRN: [id number]         Primary Care Physician: Norman Serrato MD        Subjective: feeling better and have some discomfort in the chest and feels weak..  Interval History: See consultation note.    Objective:    Scheduled Meds:amLODIPine, 10 mg, Oral, Q24H  amoxicillin, 500 mg, Oral, Q12H  aspirin, 81 mg, Oral, Daily  carvedilol, 3.125 mg, Oral, BID With Meals  Desvenlafaxine Succinate ER, 25 mg, Oral, Daily  folic acid, 1 mg, Oral, Daily  insulin lispro, 0-7 Units, Subcutaneous, 4x Daily With Meals & Nightly  levETIRAcetam, 250 mg, Oral, BID  levothyroxine, 300 mcg, Oral, Daily  lidocaine, 1 patch, Transdermal, Q24H  lidocaine, 1 patch, Transdermal, Q24H  pantoprazole, 40 mg, Oral, Daily  rOPINIRole, 2 mg, Oral, Nightly      Continuous Infusions:     Vital signs in last 24 hours:  Temp:  [98.1 °F (36.7 °C)-98.3 °F (36.8 °C)] 98.3 °F (36.8 °C)  Heart Rate:  [76-81] 81  Resp:  [18] 18  BP: (150-182)/(72-95) 182/95    Intake/Output:  No intake or output data in the 24 hours ending 23 1308    Exam:  BP (!) 182/95 (BP Location: Left arm, Patient Position: Sitting)   Pulse 81   Temp 98.3 °F (36.8 °C) (Oral)   Resp 18   Ht 157.5 cm (62\")   Wt 66.3 kg (146 lb 2.6 oz)   SpO2 98%   BMI 26.73 kg/m²   Patient is examined using the personal protective equipment as per guidelines from infection control for this particular patient as enacted.  Hand washing was performed before and after patient interaction.  General Appearance:  Chronically ill-appearing female                          Head:    Normocephalic, without obvious abnormality, atraumatic                           Eyes:    PERRL, conjunctivae/corneas clear, EOM's intact, both eyes                         Throat:   Lips, tongue, gums normal; oral mucosa pink and moist                  "          Neck:   Supple, symmetrical, trachea midline, no JVD                         Lungs:    No obvious use of accessory muscles of breathing noted decreased breath sounds at the bases.                 Chest Wall:    No tenderness or deformity                          Heart:  S1-S2 irregular                  Abdomen:   Soft nontender                 Extremities:   Extremities normal, atraumatic, no cyanosis or edema                        Pulses:   Pulses palpable in all extremities                            Skin: Bruising and ecchymotic changes noted                  Neurologic: Withdrawn and weak and does not engage.       Data Review:    I reviewed the patient's new clinical results.  Results from last 7 days   Lab Units 03/24/23  0634 03/23/23  0518 03/22/23  0403 03/21/23  0327 03/20/23  1203   WBC 10*3/mm3 11.40* 12.45* 11.68* 13.93* 13.57*   HEMOGLOBIN g/dL 9.5* 9.8* 9.2* 9.2* 7.0*   PLATELETS 10*3/mm3 337 375 357 389 353     Results from last 7 days   Lab Units 03/24/23  0634 03/23/23  0518 03/22/23  0403 03/21/23 0327 03/20/23  1203   SODIUM mmol/L 129* 132* 133* 134* 133*   POTASSIUM mmol/L 3.4* 3.6 3.4* 4.3 4.2   CHLORIDE mmol/L 97* 96* 98 93* 95*   CO2 mmol/L 20.0* 24.0 26.0 26.6 25.7   BUN mg/dL 16 24* 19 39* 33*   CREATININE mg/dL 1.60* 2.36* 1.92* 3.31* 3.11*   CALCIUM mg/dL 8.2* 8.0* 7.7* 8.3* 8.2*   GLUCOSE mg/dL 125* 124* 129* 88 214*     Microbiology Results (last 10 days)     Procedure Component Value - Date/Time    Urine Culture - Urine, Straight Cath [[id number]]  (Abnormal)  (Susceptibility) Collected: 03/21/23 1458    Lab Status: Final result Specimen: Urine from Straight Cath Updated: 03/23/23 1022     Urine Culture <25,000 CFU/mL Enterococcus faecalis    Narrative:      Colonization of the urinary tract without infection is common. Treatment is discouraged unless the patient is symptomatic, pregnant, or undergoing an invasive urologic procedure.    Susceptibility      Enterococcus  faecalis      ARSENIO      Ampicillin Susceptible      Levofloxacin Resistant     Nitrofurantoin Susceptible      Tetracycline Resistant     Vancomycin Susceptible                           Blood Culture - Blood, Arm, Right [[id number]]  (Normal) Collected: 03/20/23 1143    Lab Status: Preliminary result Specimen: Blood from Arm, Right Updated: 03/24/23 1200     Blood Culture No growth at 4 days    Blood Culture - Blood, Arm, Left [[id number]]  (Normal) Collected: 03/20/23 1143    Lab Status: Preliminary result Specimen: Blood from Arm, Left Updated: 03/24/23 1200     Blood Culture No growth at 4 days    Respiratory Panel PCR w/COVID-19(SARS-CoV-2) NAZ/SINAI/WES/PAD/COR/MAD/ABIGAIL In-House, NP Swab in UTM/VTM, 3-4 HR TAT - Swab, Nasopharynx [[id number]]  (Normal) Collected: 03/20/23 1122    Lab Status: Final result Specimen: Swab from Nasopharynx Updated: 03/20/23 1359     ADENOVIRUS, PCR Not Detected     Coronavirus 229E Not Detected     Coronavirus HKU1 Not Detected     Coronavirus NL63 Not Detected     Coronavirus OC43 Not Detected     COVID19 Not Detected     Human Metapneumovirus Not Detected     Human Rhinovirus/Enterovirus Not Detected     Influenza A PCR Not Detected     Influenza B PCR Not Detected     Parainfluenza Virus 1 Not Detected     Parainfluenza Virus 2 Not Detected     Parainfluenza Virus 3 Not Detected     Parainfluenza Virus 4 Not Detected     RSV, PCR Not Detected     Bordetella pertussis pcr Not Detected     Bordetella parapertussis PCR Not Detected     Chlamydophila pneumoniae PCR Not Detected     Mycoplasma pneumo by PCR Not Detected    Narrative:      In the setting of a positive respiratory panel with a viral infection PLUS a negative procalcitonin without other underlying concern for bacterial infection, consider observing off antibiotics or discontinuation of antibiotics and continue supportive care. If the respiratory panel is positive for atypical bacterial infection (Bordetella pertussis,  Chlamydophila pneumoniae, or Mycoplasma pneumoniae), consider antibiotic de-escalation to target atypical bacterial infection.    Urine Culture - Urine, Urine, Catheter [[id number]]  (Normal) Collected: 03/20/23 1122    Lab Status: Final result Specimen: Urine, Catheter Updated: 03/21/23 1237     Urine Culture No growth            Assessment:    Urinary tract infection without hematuria, site unspecified  1-systemic sepsis due to:              -pneumonia bilateral with moderate left-sided pleural effusion and mild right-sided pleural effusion with risk of complications of pneumonia such as evolving empyema              -urinary tract infection in the setting of end-stage renal disease and minimal urinary output              -possible line sepsis due to infected hemodialysis catheter  2-end-stage renal disease on hemodialysis  3-reactive diabetes  4-anemia  5-rheumatoid arthritis  6-other diagnoses per primary team.   C&S  E fecalis    Recommendations/Discussions:    PO amoxil for 5 days  Will follow  Thank you  .  Alexei Dunn MD  3/24/2023  13:08 EDT    .

## 2023-03-24 NOTE — PROGRESS NOTES
[facility] Progress Note      Patient Name: Zaina Martinez  : 1965  MRN: [id number]  Primary Care Physician:  Norman Serrato MD  Date of admission: 3/20/2023    Subjective     Interval History:   Follow up ESRD  Appetite very poor but no N/V; too weak to get out of bed  Improving rib pain; breathing is comfortable at rest on room air  Last HD was yesterday      Review of Systems:   As noted above    Objective     Vitals:   Temp:  [98.2 °F (36.8 °C)-98.3 °F (36.8 °C)] 98.3 °F (36.8 °C)  Heart Rate:  [76-81] 81  Resp:  [18] 18  BP: (150-182)/(77-95) 182/95  Flow (L/min):  [1] 1  No intake or output data in the 24 hours ending 23    Physical Exam:    General Appearance: very chronically ill and frail; hoarse  Skin: warm and dry, pale  HEENT: oral mucosa moist.   Neck: RIJ TDC. Tunnel non-tender.   Lungs: decreased BS both flanks, L>R; not labored on 1 L/min  Heart: RRR, no s3 or rub  Abdomen: soft, nontender, nondistended, very active bs  Extremities: no edema  Psychiatric: Flat affect and depressed mood  Neuro: Moves all extremities    Scheduled Meds:     amLODIPine, 10 mg, Oral, Q24H  [START ON 3/25/2023] amoxicillin, 500 mg, Oral, Q24H  aspirin, 81 mg, Oral, Daily  carvedilol, 3.125 mg, Oral, BID With Meals  Desvenlafaxine Succinate ER, 25 mg, Oral, Daily  folic acid, 1 mg, Oral, Daily  insulin lispro, 0-7 Units, Subcutaneous, 4x Daily With Meals & Nightly  levETIRAcetam, 250 mg, Oral, BID  levothyroxine, 300 mcg, Oral, Daily  lidocaine, 1 patch, Transdermal, Q24H  lidocaine, 1 patch, Transdermal, Q24H  pantoprazole, 40 mg, Oral, Daily  rOPINIRole, 2 mg, Oral, Nightly      IV Meds:        Results Reviewed:   I have personally reviewed the results from the time of this admission to 3/24/2023 18:32 EDT     Results from last 7 days   Lab Units 23  0634 23  0518 23  0403 23  0327 23  1203   SODIUM mmol/L 129* 132* 133* 134* 133*   POTASSIUM  mmol/L 3.4* 3.6 3.4* 4.3 4.2   CHLORIDE mmol/L 97* 96* 98 93* 95*   CO2 mmol/L 20.0* 24.0 26.0 26.6 25.7   BUN mg/dL 16 24* 19 39* 33*   CREATININE mg/dL 1.60* 2.36* 1.92* 3.31* 3.11*   CALCIUM mg/dL 8.2* 8.0* 7.7* 8.3* 8.2*   BILIRUBIN mg/dL  --   --   --  0.4 0.3   ALK PHOS U/L  --   --   --  133* 137*   ALT (SGPT) U/L  --   --   --  7 <5   AST (SGOT) U/L  --   --   --  19 13   GLUCOSE mg/dL 125* 124* 129* 88 214*       Estimated Creatinine Clearance: 34.7 mL/min (A) (by C-G formula based on SCr of 1.6 mg/dL (H)).    Results from last 7 days   Lab Units 03/23/23  0518 03/21/23  0327   MAGNESIUM mg/dL  --  1.8   PHOSPHORUS mg/dL 2.9 4.0             Results from last 7 days   Lab Units 03/24/23  0634 03/23/23  0518 03/22/23  0403 03/21/23  0327 03/20/23  1203   WBC 10*3/mm3 11.40* 12.45* 11.68* 13.93* 13.57*   HEMOGLOBIN g/dL 9.5* 9.8* 9.2* 9.2* 7.0*   PLATELETS 10*3/mm3 337 375 357 389 353             Assessment / Plan     ASSESSMENT:  1. ESRD.  Improving volume excess; has pleural effusion on left; low potassium.  Last HD was yesterday  2. Sepsis with multiple sources, PNA, moderate left and small right pleural effusions, TDC, ?UTI (culture with insignificant growth)  3. Chronic diastolic heart failure.  4. HTN controlled.   5. Anemia ckd and blood loss. 1 unit  PRBC 3/20.   6. Left renal hematoma after left ureteral stent 12/30/22.  Left renal artery embolization 1/1/23.  Left stent removal 1/10/23.   7. Rib fractures:  right 6, left 8th.  8. Hypothyroid on replacement.   9. Labile DM2    PLAN:  1.  Replace potassium  2.  HD tomorrow (TTS)  3.  Palliative care meeting with family      Alejo Lange MD  03/24/23  18:32 EDT    [facility]  [phone]

## 2023-03-24 NOTE — PROGRESS NOTES
"Daily progress note    Primary care physician      Chief complaint  Doing same with no new complaint     History of present illness  57-year-old white female with very complex past medical history including end-stage renal disease on hemodialysis insulin-dependent diabetes mellitus congestive heart failure hypothyroidism hypertension right MCA stroke and chronic anemia presented to [facility] with altered mental status fever cough and generalized weakness.  Patient evaluated in ER found to have left basilar pneumonia and acute UTI with metabolic encephalopathy admit for management.  Patient able to answer all questions but most of the history obtained from the  old record nursing staff.  Patient has no chest pain increase shortness of breath palpitation nausea vomiting diarrhea.     REVIEW OF SYSTEMS  Unremarkable except pain and weakness    PHYSICAL EXAM  Blood pressure (!) 182/95, pulse 81, temperature 98.3 °F (36.8 °C), temperature source Oral, resp. rate 18, height 157.5 cm (62\"), weight 66.3 kg (146 lb 2.6 oz), SpO2 98 %, not currently breastfeeding.    GENERAL:  Awake and alert ill-appearing lady, in no respiratory distress  HEENT:  Unremarkable  NECK:  Supple  CV: regular rhythm, normal rate, normal pulses  RESPIRATORY: normal effort, breath sounds diminished at the bases, few scattered rhonchi noted, a couple of nonproductive cough noted during exam.  ABDOMEN: soft nontender in all quadrants  MUSCULOSKELETAL: no deformity, no asymmetry  NEURO: alert, moves all extremities, follows commands, diffuse mild weakness to all extremities but no obvious focal motor deficit pattern evident.  PSYCH:  calm, cooperative  SKIN: warm, dry     LAB RESULTS  Lab Results (last 24 hours)     Procedure Component Value Units Date/Time    POC Glucose Once [[id number]]  (Abnormal) Collected: 03/24/23 1618    Specimen: Blood Updated: 03/24/23 1620     Glucose 179 mg/dL      Comment: RN Notified R and V Meter: " [id number] : [id number] Elijah SAAVEDRA       Blood Culture - Blood, Arm, Right [[id number]]  (Normal) Collected: 03/20/23 1143    Specimen: Blood from Arm, Right Updated: 03/24/23 1200     Blood Culture No growth at 4 days    Blood Culture - Blood, Arm, Left [[id number]]  (Normal) Collected: 03/20/23 1143    Specimen: Blood from Arm, Left Updated: 03/24/23 1200     Blood Culture No growth at 4 days    POC Glucose Once [[id number]]  (Abnormal) Collected: 03/24/23 1104    Specimen: Blood Updated: 03/24/23 1106     Glucose 158 mg/dL      Comment: Meter: [id number] : [id number] Edgarsadaf SAAVEDRA       Basic Metabolic Panel [[id number]]  (Abnormal) Collected: 03/24/23 0634    Specimen: Blood Updated: 03/24/23 0807     Glucose 125 mg/dL      BUN 16 mg/dL      Creatinine 1.60 mg/dL      Sodium 129 mmol/L      Potassium 3.4 mmol/L      Comment: Slight hemolysis detected by analyzer. Results may be affected.        Chloride 97 mmol/L      CO2 20.0 mmol/L      Calcium 8.2 mg/dL      BUN/Creatinine Ratio 10.0     Anion Gap 12.0 mmol/L      eGFR 37.5 mL/min/1.73     Narrative:      GFR Normal >60  Chronic Kidney Disease <60  Kidney Failure <15      CBC & Differential [[id number]]  (Abnormal) Collected: 03/24/23 0634    Specimen: Blood Updated: 03/24/23 0709    Narrative:      The following orders were created for panel order CBC & Differential.  Procedure                               Abnormality         Status                     ---------                               -----------         ------                     CBC Auto Differential[[id number]]        Abnormal            Final result                 Please view results for these tests on the individual orders.    CBC Auto Differential [[id number]]  (Abnormal) Collected: 03/24/23 0634    Specimen: Blood Updated: 03/24/23 0709     WBC 11.40 10*3/mm3      RBC 3.60 10*6/mm3      Hemoglobin 9.5 g/dL      Hematocrit 30.6 %      MCV 85.0 fL      MCH 26.4 pg      MCHC 31.0  g/dL      RDW 17.0 %      RDW-SD 52.4 fl      MPV 9.1 fL      Platelets 337 10*3/mm3      Neutrophil % 82.9 %      Lymphocyte % 7.7 %      Monocyte % 7.5 %      Eosinophil % 0.6 %      Basophil % 0.4 %      Immature Grans % 0.9 %      Neutrophils, Absolute 9.45 10*3/mm3      Lymphocytes, Absolute 0.88 10*3/mm3      Monocytes, Absolute 0.85 10*3/mm3      Eosinophils, Absolute 0.07 10*3/mm3      Basophils, Absolute 0.05 10*3/mm3      Immature Grans, Absolute 0.10 10*3/mm3      nRBC 0.0 /100 WBC     POC Glucose Once [[id number]]  (Normal) Collected: 03/24/23 0555    Specimen: Blood Updated: 03/24/23 0557     Glucose 120 mg/dL      Comment: Meter: [id number] : [id number] [place]ia[place]fyree NA       POC Glucose Once [[id number]]  (Abnormal) Collected: 03/23/23 2029    Specimen: Blood Updated: 03/23/23 2030     Glucose 184 mg/dL      Comment: Meter: [id number] : [id number] VandanaWheelzzie NA           Imaging Results (Last 24 Hours)     ** No results found for the last 24 hours. **        ECG 12 Lead             Component  Ref Range & Units 11:12  (3/20/23) 1 mo ago  (2/5/23) 1 mo ago  (1/22/23) 1 mo ago  (1/22/23) 2 mo ago  (1/1/23) 2 mo ago  (12/26/22) 3 mo ago  (12/4/22)   QT Interval  ms 415 P  418  391  372  397  514  490    Resulting Agency  ECG  ECG  ECG  ECG  ECG  ECG  ECG             HEART RATE= 71  bpm  RR Interval= 845  ms  LA Interval= 160  ms  P Horizontal Axis= 61  deg  P Front Axis= 49  deg  QRSD Interval= 101  ms  QT Interval= 415  ms  QRS Axis= -33  deg  T Wave Axis= 43  deg  - OTHERWISE NORMAL ECG -  Sinus rhythm  Left axis deviation  Baseline wander in lead(s) I,II,III,aVR,aVF,V1,V4,V5             Current Facility-Administered Medications:   •  acetaminophen (TYLENOL) tablet 650 mg, 650 mg, Oral, Q6H PRN, Dimitri Pena MD, 650 mg at 03/20/23 1757  •  amLODIPine (NORVASC) tablet 10 mg, 10 mg, Oral, Q24H, Dimitri Pena MD, 10 mg at 03/24/23 1232  •  [START ON 3/25/2023] amoxicillin (AMOXIL)  capsule 500 mg, 500 mg, Oral, Q24H, Alexei Dunn MD  •  aspirin EC tablet 81 mg, 81 mg, Oral, Daily, Dimitri Pena MD, 81 mg at 03/24/23 1235  •  carvedilol (COREG) tablet 3.125 mg, 3.125 mg, Oral, BID With Meals, Dimitri Pena MD, 3.125 mg at 03/24/23 1232  •  Desvenlafaxine Succinate ER 25 mg, 25 mg, Oral, Daily, Dimitri Pena MD, 25 mg at 03/24/23 1232  •  folic acid (FOLVITE) tablet 1 mg, 1 mg, Oral, Daily, Dimitri Pena MD, 1 mg at 03/24/23 1232  •  heparin (porcine) injection 4,000 Units, 4,000 Units, Intracatheter, PRN, Alejo Lange MD, 4,000 Units at 03/21/23 1025  •  HYDROcodone-acetaminophen (NORCO) 5-325 MG per tablet 1 tablet, 1 tablet, Oral, Q4H PRN, Dimitri Pena MD, 1 tablet at 03/24/23 1256  •  insulin lispro (ADMELOG) injection 0-7 Units, 0-7 Units, Subcutaneous, 4x Daily With Meals & Nightly, Dimitri Pena MD, 2 Units at 03/24/23 1234  •  levETIRAcetam (KEPPRA) tablet 250 mg, 250 mg, Oral, BID, Dimitri Pena MD, 250 mg at 03/24/23 1232  •  levothyroxine (SYNTHROID, LEVOTHROID) tablet 300 mcg, 300 mcg, Oral, Daily, Dimitri Pena MD, 300 mcg at 03/24/23 1232  •  lidocaine (LIDODERM) 5 % 1 patch, 1 patch, Transdermal, Q24H, Ruth Lira MD, 1 patch at 03/24/23 1236  •  lidocaine (LIDODERM) 5 % 1 patch, 1 patch, Transdermal, Q24H, Ruth Lira MD, 1 patch at 03/24/23 1232  •  melatonin tablet 3 mg, 3 mg, Oral, Nightly PRN, Dimitri Pena MD  •  ondansetron (ZOFRAN) injection 4 mg, 4 mg, Intravenous, Q4H PRN, Dimitri Pena MD  •  pantoprazole (PROTONIX) EC tablet 40 mg, 40 mg, Oral, Daily, Dimitri Pena MD, 40 mg at 03/24/23 1232  •  rOPINIRole (REQUIP) tablet 2 mg, 2 mg, Oral, Nightly, Dimitri Pena MD, 2 mg at 03/23/23 2042  •  sennosides-docusate (PERICOLACE) 8.6-50 MG per tablet 2 tablet, 2 tablet, Oral, Nightly PRN, Dimitri Pena MD  •  [COMPLETED] Insert Peripheral IV, , , Once **AND** sodium chloride 0.9 % flush 10 mL, 10 mL, Intravenous, PRN, Andrea Ocampo,  MD    ASSESSMENT  Left basilar pneumonia  Acute Enterococcus UTI  Metabolic encephalopathy  Worsening anemia  End-stage renal disease on hemodialysis  Right MCA CVA  Diastolic congestive heart failure  Insulin-dependent diabetes mellitus  Hypertension   Hyperlipidemia  Hypothyroidism  Seizure disorder  Gastroesophageal reflux disease    PLAN  CPM  Supplemental oxygen nebulizer  Antibiotics per infectious disease  Hemodialysis TIW  Transfuse as needed  Pain management  Adjust nursing home medications  Stress ulcer DVT prophylaxis  Infectious disease to follow patient  Supportive care  DNR  PT OT  Discussed with family and nursing staff  Discharge planning    TERRELL DELVALLE MD    Copied text in this note has been reviewed and is accurate as of 03/24/23

## 2023-03-24 NOTE — THERAPY TREATMENT NOTE
Patient Name: Zaina Martinez  : 1965    MRN: [id number]                              Today's Date: 3/24/2023       Admit Date: 3/20/2023    Visit Dx:     ICD-10-CM ICD-9-CM   1. Urinary tract infection without hematuria, site unspecified  N39.0 599.0   2. Pneumonia of left lower lobe due to infectious organism  J18.9 486   3. Altered mental status, unspecified altered mental status type  R41.82 780.97   4. End stage renal disease ([facility])  N18.6 585.6   5. Anemia due to chronic kidney disease, on chronic dialysis ([facility])  N18.6 585.6    D63.1 285.21    Z99.2 V45.11   6. Pleural effusion  J90 511.9   7. Hypoxia  R09.02 799.02     Patient Active Problem List   Diagnosis   • Renal insufficiency   • Hypertensive disorder   • Hypothyroidism   • Type 2 diabetes mellitus with kidney complication, with long-term current use of insulin ([facility])   • Rheumatoid arthritis ([facility])   • Angioedema   • Esophageal dysmotility   • Anemia   • Medically noncompliant   • Myocardial infarction due to demand ischemia ([facility])   • Enteritis   • PRES (posterior reversible encephalopathy syndrome)   • Urine retention   • Klebsiella infection   • Superficial thrombophlebitis   • Generalized weakness   • ESRD (end stage renal disease) ([facility])   • CAD (coronary artery disease)   • Abnormal urinalysis   • Chronic diastolic CHF (congestive heart failure) ([facility])   • Pyelonephritis   • Calculus of gallbladder with acute on chronic cholecystitis without obstruction   • Pleural effusion on right   • Anemia due to chronic kidney disease, on chronic dialysis ([facility])   • Abnormal findings on diagnostic imaging of other specified body structures   • Acute upper respiratory infection   • Agitation   • Alkaline phosphatase raised   • Casts present in urine   • Cellulitis of toe   • Hip pain   • Community acquired pneumonia   • Depressive disorder   • Diarrhea of presumed infectious origin   • Difficult or painful urination   • Disease due to severe acute respiratory  syndrome coronavirus 2 (SARS-CoV-2)   • Dyspnea   • Encounter for follow-up examination after completed treatment for conditions other than malignant neoplasm   • H/O: hypothyroidism   • Hyperlipidemia   • Hypomagnesemia   • Intractable vomiting with nausea   • Leukocytosis   • Luetscher's syndrome   • Need for influenza vaccination   • Restless legs   • Noncompliance with treatment   • Shoulder pain   • Acute UTI (urinary tract infection)   • Metabolic encephalopathy   • Abnormal findings on diagnostic imaging of abdomen   • Status post cholecystectomy   • Hyponatremia   • Acute metabolic encephalopathy   • Encephalopathy, toxic   • Acute CVA (cerebrovascular accident) ([facility])   • History of intracranial hemorrhage   • Stroke (HCC)   • Abnormal urinalysis   • Diabetic muscle infarction ([facility])   • Other insomnia   • Altered mental status   • History of stroke- R MCA s/p TPA with subsequent ICH with debility   • Heart murmur   • Bradycardia   • Left lower lobe pneumonia   • Metabolic encephalopathy   • Pericardial effusion   • Pleural effusion   • Acute pulmonary edema ([facility])   • Atrial flutter ([facility])   • Chronic systolic CHF (congestive heart failure) ([facility])   • Thrombus of venous dialysis catheter ([facility])   • Sepsis without acute organ dysfunction ([facility])   • Type 2 diabetes mellitus with hyperglycemia, with long-term current use of insulin ([facility])   • Acute respiratory failure with hypoxia ([facility])   • Acute on chronic systolic CHF (congestive heart failure) ([facility])   • Hyperkalemia   • Acute respiratory failure with hypoxia ([facility])   • Other hypervolemia   • Hematoma of right hip, initial encounter   • Ureteral stent present   • Closed intertrochanteric fracture of right femur ([facility])   • Witnessed seizure-like activity ([facility])   • Acute respiratory failure with hypercapnia ([facility])   • Opioid use   • Diabetic muscle infarction ([facility])   • Acute posthemorrhagic anemia   • Kidney hematoma   • Pyuria   • Severe malnutrition ([facility])   • Moderate  malnutrition (HCC)   • Hypophosphatemia   • Urinary tract infection without hematuria, site unspecified     Past Medical History:   Diagnosis Date   • Acute CVA (cerebrovascular accident) (HCC) 05/01/2022   • Acute on chronic diastolic CHF (congestive heart failure) (HCC)    • Anemia    • CAD (coronary artery disease) 12/20/2021   • Diabetes (HCC)    • Disease of thyroid gland    • GERD (gastroesophageal reflux disease)    • History of intracranial hemorrhage 5/1/2022   • Hyperlipidemia 11/30/2018   • Hypertension    • Rheumatoid arthritis (HCC)    • Stage 5 chronic kidney disease (HCC)      Past Surgical History:   Procedure Laterality Date   • CHOLECYSTECTOMY WITH INTRAOPERATIVE CHOLANGIOGRAM N/A 1/10/2022    Procedure: Laparoscopic cholecystectomy with intraoperative cholangiogram;  Surgeon: Ramana Raygoza MD;  Location: [facility];  Service: General;  Laterality: N/A;   • CYSTOSCOPY W/ URETERAL STENT PLACEMENT Left 9/29/2022    Procedure: CYSTOSCOPY URETERAL STENT INSERTION WITH RETROGRADE PYELOGRAM;  Surgeon: Bryant Phan Jr., MD;  Location: [facility];  Service: Urology;  Laterality: Left;   • CYSTOSCOPY W/ URETERAL STENT PLACEMENT Left 1/10/2023    Procedure: CYSTOSCOPY LEFT STENT REMOVAL;  Surgeon: Bryant Phan Jr., MD;  Location: [facility] OR;  Service: Urology;  Laterality: Left;   • EMBOLIZATION MESENTERIC ARTERY N/A 1/1/2023    Procedure: LEFT KIDNEY EMBOLIZATION;  Surgeon: Bijan Ordoñez MD;  Location: [facility] 18/19;  Service: Interventional Radiology;  Laterality: N/A;   • EYE SURGERY     • FEMUR IM NAILING/RODDING Right 11/10/2022    Procedure: FEMUR INTRAMEDULLARY NAILING/RODDING;  Surgeon: Glen Pierce MD;  Location: [facility] OR;  Service: Orthopedics;  Laterality: Right;   • HIP INTERTROCHANTERIC NAILING Right 11/10/2022    Procedure: HIP INTERTROCHANTERIC NAILING;  Surgeon: Glen Pierce MD;  Location: [facility] OR;  Service:  Orthopedics;  Laterality: Right;   • HYSTERECTOMY     • INSERT CENTRAL LINE AT BEDSIDE  12/31/2022        • INSERTION HEMODIALYSIS CATHETER N/A 12/6/2021    Procedure: HEMODIALYSIS CATHETER INSERTION;  Surgeon: Keli Salazar MD;  Location: [facility] OR 18/19;  Service: Vascular;  Laterality: N/A;   • INSERTION HEMODIALYSIS CATHETER N/A 5/3/2022    Procedure: TUNNELED CATHETER PLACEMENT;  Surgeon: Keli Salazar MD;  Location: [facility] OR;  Service: Vascular;  Laterality: N/A;   • MUSCLE BIOPSY Left 6/15/2022    Procedure: Left quadriceps muscle biopsy;  Surgeon: Marques Ma MD;  Location: [facility] OR;  Service: Neurosurgery;  Laterality: Left;   • URETEROSCOPY LASER LITHOTRIPSY WITH STENT INSERTION Left 12/30/2022    Procedure: CYSTOSCOPY WITH LEFT  URETEROSCOPY  LEFT STENT EXCHANGE;  Surgeon: Bryant Phan Jr., MD;  Location: [facility];  Service: Urology;  Laterality: Left;      General Information     Row Name 03/24/23 [facility]          Physical Therapy Time and Intention    Document Type therapy note (daily note)  -     Mode of Treatment individual therapy;physical therapy  -     Row Name 03/24/23 1432          General Information    Patient Profile Reviewed yes  -     Existing Precautions/Restrictions fall  -     Row Name 03/24/23 1432          Cognition    Orientation Status (Cognition) oriented x 3  Increased processing time  -     Row Name 03/24/23 1432          Safety Issues, Functional Mobility    Impairments Affecting Function (Mobility) strength;pain;endurance/activity tolerance  -           User Key  (r) = Recorded By, (t) = Taken By, (c) = Cosigned By    Initials Name Provider Type     Aviva Salgado PT Physical Therapist               Mobility     Row Name 03/24/23 1432          Bed Mobility    Supine-Sit [place] (Bed Mobility) moderate assist (50% patient effort);2 person assist  -     Sit-Supine [place] (Bed Mobility) maximum assist (25%  patient effort);2 person assist  -     Assistive Device (Bed Mobility) head of bed elevated;bed rails  -     Comment, (Bed Mobility) VCs needed for sequencing. Patient able to move legs to EOB but required assistance with moving trunk upright and scoot out so that feet were on the floor.  -     Row Name 03/24/23 1432          Sit-Stand Transfer    Sit-Stand [place] (Transfers) maximum assist (25% patient effort);2 person assist;verbal cues  -           User Key  (r) = Recorded By, (t) = Taken By, (c) = Cosigned By    Initials Name Provider Type     Aviva Salgado PT Physical Therapist               Obj/Interventions     Row Name 03/24/23 1434          Motor Skills    Therapeutic Exercise other (see comments)  AP, LAQ, bicep curls  -     Row Name 03/24/23 1434          Balance    Balance Assessment sitting static balance;sitting dynamic balance;sit to stand dynamic balance;standing static balance  -     Static Sitting Balance standby assist  -     Dynamic Sitting Balance standby assist;contact guard  -     Sit to Stand Dynamic Balance maximum assist;2-person assist;verbal cues  -     Static Standing Balance maximum assist;2-person assist;verbal cues  -     Comment, Balance Patient demonstrated a strong posterior lean in standing.  -           User Key  (r) = Recorded By, (t) = Taken By, (c) = Cosigned By    Initials Name Provider Type     Aviva Salgado PT Physical Therapist               Goals/Plan    No documentation.                Clinical Impression     Row Name 03/24/23 1438          Pain    Pretreatment Pain Rating 9/10  -     Posttreatment Pain Rating 9/10  -     Pain Location - flank  -     Pre/Posttreatment Pain Comment Patient reports rib pain but did not rate  -     Pain Intervention(s) Rest;Repositioned  -     Row Name 03/24/23 1436          Plan of Care Review    Plan of Care Reviewed With patient  -     Outcome Evaluation Patient seen for PT treatement  this PM. Patient supine in bed upon arrival, alert, and agreeable to participation in PT this date. Patient sat up to EOB with modAx2. VCs needed for sequencing. Patient able to move legs to EOB but required assistance with moving trunk upright and scoot out so that feet were on the floor. Patient sat at EOB with SBA-CGA. Patient sat EOB for ~8 minutes. Patient performed STS from EOB with MaxAx2. Patient demonstrated a strong posterior lean in standing. Patient stood for ~8 seconds before returning to sitting with reports of rib pain. Patient required maxAx2 to return to supine and scoot up in bed. Patient would continue to benefit from skilled PT intervention to address deficits in functional mobility. PT will continue to monitor.  -     Row Name 03/24/23 1435          Vital Signs    O2 Delivery Pre Treatment room air  -SM     O2 Delivery Intra Treatment room air  -SM     O2 Delivery Post Treatment room air  -SM     Pre Patient Position Supine  -SM     Intra Patient Position Standing  -SM     Post Patient Position Supine  -SM     Row Name 03/24/23 1435          Positioning and Restraints    Pre-Treatment Position in bed  -SM     Post Treatment Position bed  -SM     In Bed call light within reach;notified nsg;encouraged to call for assist;exit alarm on;fowlers  -           User Key  (r) = Recorded By, (t) = Taken By, (c) = Cosigned By    Initials Name Provider Type    Aviva Jarvis, PT Physical Therapist               Outcome Measures     Row Name 03/24/23 5783          How much help from another person do you currently need...    Turning from your back to your side while in flat bed without using bedrails? 2  -SM     Moving from lying on back to sitting on the side of a flat bed without bedrails? 2  -SM     Moving to and from a bed to a chair (including a wheelchair)? 2  -SM     Standing up from a chair using your arms (e.g., wheelchair, bedside chair)? 2  -SM     Climbing 3-5 steps with a railing? 1   -     To walk in hospital room? 1  -     AM-PAC 6 Clicks Score (PT) 10  -     Highest level of mobility 4 --> Transferred to chair/commode  -     Row Name 03/24/23 1440          Functional Assessment    Outcome Measure Options AM-PAC 6 Clicks Basic Mobility (PT)  -           User Key  (r) = Recorded By, (t) = Taken By, (c) = Cosigned By    Initials Name Provider Type     Aviva Salgado, VICKIE Physical Therapist                             Physical Therapy Education     Title: PT OT SLP Therapies (Done)     Topic: Physical Therapy (Done)     Point: Mobility training (Done)     Learning Progress Summary           Patient Acceptance, E, VU by  at 3/24/2023 1440    Acceptance, E,TB, VU by MS at 3/22/2023 0920                   Point: Home exercise program (Done)     Learning Progress Summary           Patient Acceptance, E, VU by  at 3/24/2023 1440                   Point: Body mechanics (Done)     Learning Progress Summary           Patient Acceptance, E, VU by  at 3/24/2023 1440                   Point: Precautions (Done)     Learning Progress Summary           Patient Acceptance, E, VU by  at 3/24/2023 1440                               User Key     Initials Effective Dates Name Provider Type Discipline    MS 06/16/21 -  Teagan Santos PT Physical Therapist PT     05/02/22 -  Aviva Salgado PT Physical Therapist PT              PT Recommendation and Plan     Plan of Care Reviewed With: patient  Outcome Evaluation: Patient seen for PT treatement this PM. Patient supine in bed upon arrival, alert, and agreeable to participation in PT this date. Patient sat up to EOB with modAx2. VCs needed for sequencing. Patient able to move legs to EOB but required assistance with moving trunk upright and scoot out so that feet were on the floor. Patient sat at EOB with SBA-CGA. Patient sat EOB for ~8 minutes. Patient performed STS from EOB with MaxAx2. Patient demonstrated a strong posterior lean in  standing. Patient stood for ~8 seconds before returning to sitting with reports of rib pain. Patient required maxAx2 to return to supine and scoot up in bed. Patient would continue to benefit from skilled PT intervention to address deficits in functional mobility. PT will continue to monitor.     Time Calculation:    PT Charges     Row Name 03/24/23 1441             Time Calculation    Start Time 1325  -SM      Stop Time 1348  -SM      Time Calculation (min) 23 min  -SM      PT Received On 03/24/23  -SM      PT - Next Appointment 03/27/23  -SM         Time Calculation- PT    Total Timed Code Minutes- PT 23 minute(s)  -SM         Timed Charges    73196 - PT Therapeutic Activity Minutes 23  -SM         Total Minutes    Timed Charges Total Minutes 23  -SM       Total Minutes 23  -SM            User Key  (r) = Recorded By, (t) = Taken By, (c) = Cosigned By    Initials Name Provider Type    Aviva Jarvis PT Physical Therapist              Therapy Charges for Today     Code Description Service Date Service Provider Modifiers Qty    98880560006  PT THERAPEUTIC ACT EA 15 MIN 3/24/2023 Aviva Salgado PT GP 2    [id number] HC PT THER SUPP EA 15 MIN 3/24/2023 Aviva Salgado, PT GP 2          PT G-Codes  Outcome Measure Options: AM-PAC 6 Clicks Basic Mobility (PT)  AM-PAC 6 Clicks Score (PT): 10  AM-PAC 6 Clicks Score (OT): 11    Patient was not wearing a face mask during this therapy encounter. Therapist used appropriate personal protective equipment including gown, eye protection, mask and gloves.  Mask used was standard procedure mask. Appropriate PPE was worn during the entire therapy session. Hand hygiene was completed before and after therapy session. Patient is not in enhanced droplet precautions.       Aviva Salgado PT  3/24/2023

## 2023-03-25 LAB
ALBUMIN SERPL-MCNC: 2.6 G/DL (ref 3.5–5.2)
ANION GAP SERPL CALCULATED.3IONS-SCNC: 9.6 MMOL/L (ref 5–15)
BACTERIA SPEC AEROBE CULT: NORMAL
BACTERIA SPEC AEROBE CULT: NORMAL
BASOPHILS # BLD AUTO: 0.05 10*3/MM3 (ref 0–0.2)
BASOPHILS NFR BLD AUTO: 0.4 % (ref 0–1.5)
BUN SERPL-MCNC: 22 MG/DL (ref 6–20)
BUN/CREAT SERPL: 10.3 (ref 7–25)
CALCIUM SPEC-SCNC: 8.8 MG/DL (ref 8.6–10.5)
CHLORIDE SERPL-SCNC: 97 MMOL/L (ref 98–107)
CO2 SERPL-SCNC: 24.4 MMOL/L (ref 22–29)
CREAT SERPL-MCNC: 2.14 MG/DL (ref 0.57–1)
DEPRECATED RDW RBC AUTO: 52.3 FL (ref 37–54)
EGFRCR SERPLBLD CKD-EPI 2021: 26.4 ML/MIN/1.73
EOSINOPHIL # BLD AUTO: 0.12 10*3/MM3 (ref 0–0.4)
EOSINOPHIL NFR BLD AUTO: 1 % (ref 0.3–6.2)
ERYTHROCYTE [DISTWIDTH] IN BLOOD BY AUTOMATED COUNT: 17.3 % (ref 12.3–15.4)
GLUCOSE BLDC GLUCOMTR-MCNC: 108 MG/DL (ref 70–130)
GLUCOSE BLDC GLUCOMTR-MCNC: 180 MG/DL (ref 70–130)
GLUCOSE BLDC GLUCOMTR-MCNC: 95 MG/DL (ref 70–130)
GLUCOSE SERPL-MCNC: 97 MG/DL (ref 65–99)
HCT VFR BLD AUTO: 31.1 % (ref 34–46.6)
HGB BLD-MCNC: 10 G/DL (ref 12–15.9)
IMM GRANULOCYTES # BLD AUTO: 0.12 10*3/MM3 (ref 0–0.05)
IMM GRANULOCYTES NFR BLD AUTO: 1 % (ref 0–0.5)
LYMPHOCYTES # BLD AUTO: 1.23 10*3/MM3 (ref 0.7–3.1)
LYMPHOCYTES NFR BLD AUTO: 10.5 % (ref 19.6–45.3)
MAGNESIUM SERPL-MCNC: 1.9 MG/DL (ref 1.6–2.6)
MCH RBC QN AUTO: 26.8 PG (ref 26.6–33)
MCHC RBC AUTO-ENTMCNC: 32.2 G/DL (ref 31.5–35.7)
MCV RBC AUTO: 83.4 FL (ref 79–97)
MONOCYTES # BLD AUTO: 0.83 10*3/MM3 (ref 0.1–0.9)
MONOCYTES NFR BLD AUTO: 7.1 % (ref 5–12)
NEUTROPHILS NFR BLD AUTO: 80 % (ref 42.7–76)
NEUTROPHILS NFR BLD AUTO: 9.41 10*3/MM3 (ref 1.7–7)
NRBC BLD AUTO-RTO: 0 /100 WBC (ref 0–0.2)
PHOSPHATE SERPL-MCNC: 2.2 MG/DL (ref 2.5–4.5)
PLATELET # BLD AUTO: 353 10*3/MM3 (ref 140–450)
PMV BLD AUTO: 9.3 FL (ref 6–12)
POTASSIUM SERPL-SCNC: 4.4 MMOL/L (ref 3.5–5.2)
RBC # BLD AUTO: 3.73 10*6/MM3 (ref 3.77–5.28)
SODIUM SERPL-SCNC: 131 MMOL/L (ref 136–145)
WBC NRBC COR # BLD: 11.76 10*3/MM3 (ref 3.4–10.8)

## 2023-03-25 PROCEDURE — 25010000002 HEPARIN (PORCINE) PER 1000 UNITS: Performed by: INTERNAL MEDICINE

## 2023-03-25 PROCEDURE — 82962 GLUCOSE BLOOD TEST: CPT

## 2023-03-25 PROCEDURE — 83735 ASSAY OF MAGNESIUM: CPT | Performed by: INTERNAL MEDICINE

## 2023-03-25 PROCEDURE — 80069 RENAL FUNCTION PANEL: CPT | Performed by: INTERNAL MEDICINE

## 2023-03-25 PROCEDURE — 85025 COMPLETE CBC W/AUTO DIFF WBC: CPT | Performed by: HOSPITALIST

## 2023-03-25 RX ADMIN — PANTOPRAZOLE SODIUM 40 MG: 40 TABLET, DELAYED RELEASE ORAL at 08:11

## 2023-03-25 RX ADMIN — HYDROCODONE BITARTRATE AND ACETAMINOPHEN 1 TABLET: 5; 325 TABLET ORAL at 02:45

## 2023-03-25 RX ADMIN — LEVETIRACETAM 250 MG: 250 TABLET, FILM COATED ORAL at 08:12

## 2023-03-25 RX ADMIN — HYDROCODONE BITARTRATE AND ACETAMINOPHEN 1 TABLET: 5; 325 TABLET ORAL at 08:11

## 2023-03-25 RX ADMIN — DESVENLAFAXINE SUCCINATE 25 MG: 25 TABLET, EXTENDED RELEASE ORAL at 08:12

## 2023-03-25 RX ADMIN — HEPARIN SODIUM 4000 UNITS: 1000 INJECTION INTRAVENOUS; SUBCUTANEOUS at 10:46

## 2023-03-25 RX ADMIN — CARVEDILOL 3.12 MG: 3.12 TABLET, FILM COATED ORAL at 20:15

## 2023-03-25 RX ADMIN — AMLODIPINE BESYLATE 10 MG: 10 TABLET ORAL at 08:11

## 2023-03-25 RX ADMIN — HYDROCODONE BITARTRATE AND ACETAMINOPHEN 1 TABLET: 5; 325 TABLET ORAL at 14:09

## 2023-03-25 RX ADMIN — LIDOCAINE 1 PATCH: 50 PATCH CUTANEOUS at 08:12

## 2023-03-25 RX ADMIN — ROPINIROLE 2 MG: 2 TABLET, FILM COATED ORAL at 20:15

## 2023-03-25 RX ADMIN — Medication 1 MG: at 08:11

## 2023-03-25 RX ADMIN — LEVETIRACETAM 250 MG: 250 TABLET, FILM COATED ORAL at 20:15

## 2023-03-25 RX ADMIN — AMOXICILLIN 500 MG: 250 CAPSULE ORAL at 14:09

## 2023-03-25 RX ADMIN — ASPIRIN 81 MG: 81 TABLET, COATED ORAL at 08:11

## 2023-03-25 RX ADMIN — LEVOTHYROXINE SODIUM 300 MCG: 0.15 TABLET ORAL at 08:11

## 2023-03-25 RX ADMIN — HYDROCODONE BITARTRATE AND ACETAMINOPHEN 1 TABLET: 5; 325 TABLET ORAL at 20:20

## 2023-03-25 RX ADMIN — CARVEDILOL 3.12 MG: 3.12 TABLET, FILM COATED ORAL at 08:12

## 2023-03-25 NOTE — PROGRESS NOTES
"Daily progress note    Primary care physician      Chief complaint  Doing same with no new complaint and getting hemodialysis    History of present illness  57-year-old white female with very complex past medical history including end-stage renal disease on hemodialysis insulin-dependent diabetes mellitus congestive heart failure hypothyroidism hypertension right MCA stroke and chronic anemia presented to [facility] with altered mental status fever cough and generalized weakness.  Patient evaluated in ER found to have left basilar pneumonia and acute UTI with metabolic encephalopathy admit for management.  Patient able to answer all questions but most of the history obtained from the  old record nursing staff.  Patient has no chest pain increase shortness of breath palpitation nausea vomiting diarrhea.     REVIEW OF SYSTEMS  Unremarkable except pain and weakness    PHYSICAL EXAM  Blood pressure 153/73, pulse 85, temperature 97.6 °F (36.4 °C), temperature source Oral, resp. rate 18, height 157.5 cm (62\"), weight 64 kg (141 lb 1.5 oz), SpO2 97 %, not currently breastfeeding.    GENERAL:  Awake and alert ill-appearing lady, in no respiratory distress  HEENT:  Unremarkable  NECK:  Supple  CV: regular rhythm, normal rate, normal pulses  RESPIRATORY: normal effort, breath sounds diminished at the bases, few scattered rhonchi noted, a couple of nonproductive cough noted during exam.  ABDOMEN: soft nontender in all quadrants  MUSCULOSKELETAL: no deformity, no asymmetry  NEURO: alert, moves all extremities, follows commands, diffuse mild weakness to all extremities but no obvious focal motor deficit pattern evident.  PSYCH:  calm, cooperative  SKIN: warm, dry     LAB RESULTS  Lab Results (last 24 hours)     Procedure Component Value Units Date/Time    Blood Culture - Blood, Arm, Right [[id number]]  (Normal) Collected: 03/20/23 1143    Specimen: Blood from Arm, Right Updated: 03/25/23 1200     Blood " Culture No growth at 5 days    Blood Culture - Blood, Arm, Left [[id number]]  (Normal) Collected: 03/20/23 1143    Specimen: Blood from Arm, Left Updated: 03/25/23 1200     Blood Culture No growth at 5 days    POC Glucose Once [[id number]]  (Normal) Collected: 03/25/23 0611    Specimen: Blood Updated: 03/25/23 0612     Glucose 95 mg/dL      Comment: Meter: [id number] : [id number] SAAVEDRA       Renal Function Panel [[id number]]  (Abnormal) Collected: 03/25/23 0316    Specimen: Blood Updated: 03/25/23 0513     Glucose 97 mg/dL      BUN 22 mg/dL      Creatinine 2.14 mg/dL      Sodium 131 mmol/L      Potassium 4.4 mmol/L      Chloride 97 mmol/L      CO2 24.4 mmol/L      Calcium 8.8 mg/dL      Albumin 2.6 g/dL      Phosphorus 2.2 mg/dL      Anion Gap 9.6 mmol/L      BUN/Creatinine Ratio 10.3     eGFR 26.4 mL/min/1.73     Narrative:      GFR Normal >60  Chronic Kidney Disease <60  Kidney Failure <15      Magnesium [[id number]]  (Normal) Collected: 03/25/23 0316    Specimen: Blood Updated: 03/25/23 0504     Magnesium 1.9 mg/dL     CBC & Differential [[id number]]  (Abnormal) Collected: 03/25/23 0316    Specimen: Blood Updated: 03/25/23 0447    Narrative:      The following orders were created for panel order CBC & Differential.  Procedure                               Abnormality         Status                     ---------                               -----------         ------                     CBC Auto Differential[[id number]]        Abnormal            Final result                 Please view results for these tests on the individual orders.    CBC Auto Differential [[id number]]  (Abnormal) Collected: 03/25/23 0316    Specimen: Blood Updated: 03/25/23 0447     WBC 11.76 10*3/mm3      RBC 3.73 10*6/mm3      Hemoglobin 10.0 g/dL      Hematocrit 31.1 %      MCV 83.4 fL      MCH 26.8 pg      MCHC 32.2 g/dL      RDW 17.3 %      RDW-SD 52.3 fl      MPV 9.3 fL      Platelets 353 10*3/mm3      Neutrophil % 80.0 %       Lymphocyte % 10.5 %      Monocyte % 7.1 %      Eosinophil % 1.0 %      Basophil % 0.4 %      Immature Grans % 1.0 %      Neutrophils, Absolute 9.41 10*3/mm3      Lymphocytes, Absolute 1.23 10*3/mm3      Monocytes, Absolute 0.83 10*3/mm3      Eosinophils, Absolute 0.12 10*3/mm3      Basophils, Absolute 0.05 10*3/mm3      Immature Grans, Absolute 0.12 10*3/mm3      nRBC 0.0 /100 WBC     POC Glucose Once [[id number]]  (Normal) Collected: 03/24/23 2039    Specimen: Blood Updated: 03/24/23 2041     Glucose 130 mg/dL      Comment: Meter: [id number] : [id number] Vandana SAAVEDRA       POC Glucose Once [[id number]]  (Abnormal) Collected: 03/24/23 1618    Specimen: Blood Updated: 03/24/23 1620     Glucose 179 mg/dL      Comment: RN Notified R and V Meter: [id number] : [id number] Elijah SAAVEDRA           Imaging Results (Last 24 Hours)     ** No results found for the last 24 hours. **        ECG 12 Lead             Component  Ref Range & Units 11:12  (3/20/23) 1 mo ago  (2/5/23) 1 mo ago  (1/22/23) 1 mo ago  (1/22/23) 2 mo ago  (1/1/23) 2 mo ago  (12/26/22) 3 mo ago  (12/4/22)   QT Interval  ms 415 P  418  391  372  397  514  490    Resulting Agency  ECG  ECG  ECG  ECG  ECG  ECG  ECG             HEART RATE= 71  bpm  RR Interval= 845  ms  AR Interval= 160  ms  P Horizontal Axis= 61  deg  P Front Axis= 49  deg  QRSD Interval= 101  ms  QT Interval= 415  ms  QRS Axis= -33  deg  T Wave Axis= 43  deg  - OTHERWISE NORMAL ECG -  Sinus rhythm  Left axis deviation  Baseline wander in lead(s) I,II,III,aVR,aVF,V1,V4,V5             Current Facility-Administered Medications:   •  acetaminophen (TYLENOL) tablet 650 mg, 650 mg, Oral, Q6H PRN, Dimitri Pena MD, 650 mg at 03/20/23 1757  •  amLODIPine (NORVASC) tablet 10 mg, 10 mg, Oral, Q24H, Dimitri Pena MD, 10 mg at 03/25/23 0811  •  amoxicillin (AMOXIL) capsule 500 mg, 500 mg, Oral, Q24H, Alexei Dunn MD, 500 mg at 03/25/23 1409  •  aspirin EC tablet 81 mg, 81 mg,  Oral, Daily, Dimitri Pena MD, 81 mg at 03/25/23 0811  •  carvedilol (COREG) tablet 3.125 mg, 3.125 mg, Oral, BID With Meals, Dimitri Pena MD, 3.125 mg at 03/25/23 0812  •  Desvenlafaxine Succinate ER 25 mg, 25 mg, Oral, Daily, Dimitri Pena MD, 25 mg at 03/25/23 0812  •  folic acid (FOLVITE) tablet 1 mg, 1 mg, Oral, Daily, Dimitri Pena MD, 1 mg at 03/25/23 0811  •  heparin (porcine) injection 4,000 Units, 4,000 Units, Intracatheter, PRN, Alejo Lange MD, 4,000 Units at 03/25/23 1046  •  HYDROcodone-acetaminophen (NORCO) 5-325 MG per tablet 1 tablet, 1 tablet, Oral, Q4H PRN, Dimitri Pena MD, 1 tablet at 03/25/23 1409  •  insulin lispro (ADMELOG) injection 0-7 Units, 0-7 Units, Subcutaneous, 4x Daily With Meals & Nightly, Dimitri Pena MD, 2 Units at 03/24/23 1739  •  levETIRAcetam (KEPPRA) tablet 250 mg, 250 mg, Oral, BID, Dimitri Pena MD, 250 mg at 03/25/23 0812  •  levothyroxine (SYNTHROID, LEVOTHROID) tablet 300 mcg, 300 mcg, Oral, Daily, Dimitri Pena MD, 300 mcg at 03/25/23 0811  •  lidocaine (LIDODERM) 5 % 1 patch, 1 patch, Transdermal, Q24H, Ruth Lira MD, 1 patch at 03/25/23 0812  •  lidocaine (LIDODERM) 5 % 1 patch, 1 patch, Transdermal, Q24H, Ruth Lira MD, 1 patch at 03/25/23 0812  •  melatonin tablet 3 mg, 3 mg, Oral, Nightly PRN, Dimitri Pena MD  •  ondansetron (ZOFRAN) injection 4 mg, 4 mg, Intravenous, Q4H PRN, Dimitri Pena MD, 4 mg at 03/24/23 2153  •  pantoprazole (PROTONIX) EC tablet 40 mg, 40 mg, Oral, Daily, Dimitri Pena MD, 40 mg at 03/25/23 0811  •  rOPINIRole (REQUIP) tablet 2 mg, 2 mg, Oral, Nightly, Dimitri Pena MD, 2 mg at 03/24/23 2042  •  sennosides-docusate (PERICOLACE) 8.6-50 MG per tablet 2 tablet, 2 tablet, Oral, Nightly PRN, Dimitri Pena MD  •  [COMPLETED] Insert Peripheral IV, , , Once **AND** sodium chloride 0.9 % flush 10 mL, 10 mL, Intravenous, PRN, Andrea Ocampo MD    ASSESSMENT  Left basilar pneumonia  Acute Enterococcus UTI  Metabolic  encephalopathy  Worsening anemia  End-stage renal disease on hemodialysis  Right MCA CVA  Diastolic congestive heart failure  Insulin-dependent diabetes mellitus  Hypertension   Hyperlipidemia  Hypothyroidism  Seizure disorder  Gastroesophageal reflux disease    PLAN  CPM  Supplemental oxygen nebulizer  Continue antibiotics   Hemodialysis today  Transfuse as needed  Pain management  Adjust nursing home medications  Stress ulcer DVT prophylaxis  Infectious disease to follow patient  Supportive care  DNR  PT OT  Discussed with family and nursing staff  Discharge planning    TERRELL DELVALLE MD    Copied text in this note has been reviewed and is accurate as of 03/25/23

## 2023-03-25 NOTE — PROGRESS NOTES
[facility] Progress Note      Patient Name: Zaina Martinez  : 1965  MRN: [id number]  Primary Care Physician:  Norman Serrato MD  Date of admission: 3/20/2023    Subjective     Interval History:   Follow up ESRD  Appetite very poor, remains too weak to get out of bed  breathing appears comfortable at rest on room air  Hd scheduled for today      Review of Systems:   As noted above    Objective     Vitals:   Temp:  [97.6 °F (36.4 °C)-98.5 °F (36.9 °C)] 97.6 °F (36.4 °C)  Heart Rate:  [73-81] 75  Resp:  [18] 18  BP: (150-182)/(80-95) 160/89  No intake or output data in the 24 hours ending 23 0904    Physical Exam:    General Appearance: very chronically ill and frail  Skin: warm and dry, pale  HEENT: oral mucosa moist.   Neck: RIJ TDC. Tunnel non-tender.   Lungs: upper airway sounds, dull in bases  Heart: RRR, no s3 or rub  Abdomen: soft, nontender, nondistended, very active bs  Extremities: no edema  Psychiatric: Flat affect and depressed mood  Neuro: Moves all extremities    Scheduled Meds:     amLODIPine, 10 mg, Oral, Q24H  amoxicillin, 500 mg, Oral, Q24H  aspirin, 81 mg, Oral, Daily  carvedilol, 3.125 mg, Oral, BID With Meals  Desvenlafaxine Succinate ER, 25 mg, Oral, Daily  folic acid, 1 mg, Oral, Daily  insulin lispro, 0-7 Units, Subcutaneous, 4x Daily With Meals & Nightly  levETIRAcetam, 250 mg, Oral, BID  levothyroxine, 300 mcg, Oral, Daily  lidocaine, 1 patch, Transdermal, Q24H  lidocaine, 1 patch, Transdermal, Q24H  pantoprazole, 40 mg, Oral, Daily  rOPINIRole, 2 mg, Oral, Nightly      IV Meds:        Results Reviewed:   I have personally reviewed the results from the time of this admission to 3/25/2023 09:04 EDT     Results from last 7 days   Lab Units 23  0316 23  0634 23  0518 23  0403 23  0327 23  1203   SODIUM mmol/L 131* 129* 132*   < > 134* 133*   POTASSIUM mmol/L 4.4 3.4* 3.6   < > 4.3 4.2   CHLORIDE mmol/L 97* 97* 96*   <  > 93* 95*   CO2 mmol/L 24.4 20.0* 24.0   < > 26.6 25.7   BUN mg/dL 22* 16 24*   < > 39* 33*   CREATININE mg/dL 2.14* 1.60* 2.36*   < > 3.31* 3.11*   CALCIUM mg/dL 8.8 8.2* 8.0*   < > 8.3* 8.2*   BILIRUBIN mg/dL  --   --   --   --  0.4 0.3   ALK PHOS U/L  --   --   --   --  133* 137*   ALT (SGPT) U/L  --   --   --   --  7 <5   AST (SGOT) U/L  --   --   --   --  19 13   GLUCOSE mg/dL 97 125* 124*   < > 88 214*    < > = values in this interval not displayed.       Estimated Creatinine Clearance: 25.5 mL/min (A) (by C-G formula based on SCr of 2.14 mg/dL (H)).    Results from last 7 days   Lab Units 03/25/23  0316 03/23/23  0518 03/21/23  0327   MAGNESIUM mg/dL 1.9  --  1.8   PHOSPHORUS mg/dL 2.2* 2.9 4.0             Results from last 7 days   Lab Units 03/25/23  0316 03/24/23  0634 03/23/23  0518 03/22/23  0403 03/21/23  0327   WBC 10*3/mm3 11.76* 11.40* 12.45* 11.68* 13.93*   HEMOGLOBIN g/dL 10.0* 9.5* 9.8* 9.2* 9.2*   PLATELETS 10*3/mm3 353 337 375 357 389             Assessment / Plan     ASSESSMENT:  1. ESRD.  With progressively improving volume excess; has pleural effusion on left;  HD today  2. Sepsis with multiple sources, PNA, moderate left and small right pleural effusions, TDC, ?UTI (culture with insignificant growth)  3. Chronic diastolic heart failure.  4. HTN controlled.   5. Anemia ckd and blood loss. 1 unit  PRBC 3/20.   6. Left renal hematoma after left ureteral stent 12/30/22.  Left renal artery embolization 1/1/23.  Left stent removal 1/10/23.   7. Rib fractures:  right 6, left 8th.  8. Hypothyroid on replacement.   9. Labile DM2    PLAN:  1.  Replace potassium prn  2.  HD today and TTS  3.  Palliative care meeting with family      Moises Lucero MD  03/25/23  09:04 EDT    [facility]  [phone]

## 2023-03-25 NOTE — PROGRESS NOTES
"  Infectious Diseases Progress Note         [facility]  Los: 5 days  Patient Identification:  Name: Zaina Martinez  Age: 57 y.o.  Sex: female  :  1965  MRN: [id number]         Primary Care Physician: Norman Serrato MD        Subjective: feeling better and have some discomfort in the chest and feels weak..  Interval History: See consultation note.    Objective:    Scheduled Meds:amLODIPine, 10 mg, Oral, Q24H  amoxicillin, 500 mg, Oral, Q24H  aspirin, 81 mg, Oral, Daily  carvedilol, 3.125 mg, Oral, BID With Meals  Desvenlafaxine Succinate ER, 25 mg, Oral, Daily  folic acid, 1 mg, Oral, Daily  insulin lispro, 0-7 Units, Subcutaneous, 4x Daily With Meals & Nightly  levETIRAcetam, 250 mg, Oral, BID  levothyroxine, 300 mcg, Oral, Daily  lidocaine, 1 patch, Transdermal, Q24H  lidocaine, 1 patch, Transdermal, Q24H  pantoprazole, 40 mg, Oral, Daily  rOPINIRole, 2 mg, Oral, Nightly      Continuous Infusions:     Vital signs in last 24 hours:  Temp:  [97.3 °F (36.3 °C)-98.5 °F (36.9 °C)] 97.6 °F (36.4 °C)  Heart Rate:  [69-85] 85  Resp:  [16-18] 18  BP: (141-182)/(73-90) 153/73    Intake/Output:    Intake/Output Summary (Last 24 hours) at 3/25/2023 1451  Last data filed at 3/25/2023 1200  Gross per 24 hour   Intake --   Output 2400 ml   Net -2400 ml       Exam:  /73 (BP Location: Right arm, Patient Position: Lying)   Pulse 85   Temp 97.6 °F (36.4 °C) (Oral)   Resp 18   Ht 157.5 cm (62\")   Wt 64 kg (141 lb 1.5 oz)   SpO2 97%   BMI 25.81 kg/m²   Patient is examined using the personal protective equipment as per guidelines from infection control for this particular patient as enacted.  Hand washing was performed before and after patient interaction.  General Appearance:  Chronically ill-appearing female                          Head:    Normocephalic, without obvious abnormality, atraumatic                           Eyes:    PERRL, conjunctivae/corneas clear, EOM's intact, both eyes             "             Throat:   Lips, tongue, gums normal; oral mucosa pink and moist                           Neck:   Supple, symmetrical, trachea midline, no JVD                         Lungs:    No obvious use of accessory muscles of breathing noted decreased breath sounds at the bases.                 Chest Wall:    No tenderness or deformity                          Heart:  S1-S2 irregular                  Abdomen:   Soft nontender                 Extremities:   Extremities normal, atraumatic, no cyanosis or edema                        Pulses:   Pulses palpable in all extremities                            Skin: Bruising and ecchymotic changes noted                  Neurologic: Withdrawn and weak and does not engage.       Data Review:    I reviewed the patient's new clinical results.  Results from last 7 days   Lab Units 03/25/23  0316 03/24/23  0634 03/23/23  0518 03/22/23  0403 03/21/23  0327 03/20/23  1203   WBC 10*3/mm3 11.76* 11.40* 12.45* 11.68* 13.93* 13.57*   HEMOGLOBIN g/dL 10.0* 9.5* 9.8* 9.2* 9.2* 7.0*   PLATELETS 10*3/mm3 353 337 375 357 389 353     Results from last 7 days   Lab Units 03/25/23  0316 03/24/23  0634 03/23/23  0518 03/22/23  0403 03/21/23  0327 03/20/23  1203   SODIUM mmol/L 131* 129* 132* 133* 134* 133*   POTASSIUM mmol/L 4.4 3.4* 3.6 3.4* 4.3 4.2   CHLORIDE mmol/L 97* 97* 96* 98 93* 95*   CO2 mmol/L 24.4 20.0* 24.0 26.0 26.6 25.7   BUN mg/dL 22* 16 24* 19 39* 33*   CREATININE mg/dL 2.14* 1.60* 2.36* 1.92* 3.31* 3.11*   CALCIUM mg/dL 8.8 8.2* 8.0* 7.7* 8.3* 8.2*   GLUCOSE mg/dL 97 125* 124* 129* 88 214*     Microbiology Results (last 10 days)     Procedure Component Value - Date/Time    Urine Culture - Urine, Straight Cath [[id number]]  (Abnormal)  (Susceptibility) Collected: 03/21/23 9428    Lab Status: Final result Specimen: Urine from Straight Cath Updated: 03/23/23 1022     Urine Culture <25,000 CFU/mL Enterococcus faecalis    Narrative:      Colonization of the urinary tract without  infection is common. Treatment is discouraged unless the patient is symptomatic, pregnant, or undergoing an invasive urologic procedure.    Susceptibility      Enterococcus faecalis      ARSENIO      Ampicillin Susceptible      Levofloxacin Resistant     Nitrofurantoin Susceptible      Tetracycline Resistant     Vancomycin Susceptible                           Blood Culture - Blood, Arm, Right [[id number]]  (Normal) Collected: 03/20/23 1143    Lab Status: Final result Specimen: Blood from Arm, Right Updated: 03/25/23 1200     Blood Culture No growth at 5 days    Blood Culture - Blood, Arm, Left [[id number]]  (Normal) Collected: 03/20/23 1143    Lab Status: Final result Specimen: Blood from Arm, Left Updated: 03/25/23 1200     Blood Culture No growth at 5 days    Respiratory Panel PCR w/COVID-19(SARS-CoV-2) NAZ/ISNAI/WES/PAD/COR/MAD/ABIGAIL In-House, NP Swab in UTM/VTM, 3-4 HR TAT - Swab, Nasopharynx [[id number]]  (Normal) Collected: 03/20/23 1122    Lab Status: Final result Specimen: Swab from Nasopharynx Updated: 03/20/23 1359     ADENOVIRUS, PCR Not Detected     Coronavirus 229E Not Detected     Coronavirus HKU1 Not Detected     Coronavirus NL63 Not Detected     Coronavirus OC43 Not Detected     COVID19 Not Detected     Human Metapneumovirus Not Detected     Human Rhinovirus/Enterovirus Not Detected     Influenza A PCR Not Detected     Influenza B PCR Not Detected     Parainfluenza Virus 1 Not Detected     Parainfluenza Virus 2 Not Detected     Parainfluenza Virus 3 Not Detected     Parainfluenza Virus 4 Not Detected     RSV, PCR Not Detected     Bordetella pertussis pcr Not Detected     Bordetella parapertussis PCR Not Detected     Chlamydophila pneumoniae PCR Not Detected     Mycoplasma pneumo by PCR Not Detected    Narrative:      In the setting of a positive respiratory panel with a viral infection PLUS a negative procalcitonin without other underlying concern for bacterial infection, consider observing off antibiotics or  discontinuation of antibiotics and continue supportive care. If the respiratory panel is positive for atypical bacterial infection (Bordetella pertussis, Chlamydophila pneumoniae, or Mycoplasma pneumoniae), consider antibiotic de-escalation to target atypical bacterial infection.    Urine Culture - Urine, Urine, Catheter [[id number]]  (Normal) Collected: 03/20/23 1122    Lab Status: Final result Specimen: Urine, Catheter Updated: 03/21/23 1237     Urine Culture No growth            Assessment:    Urinary tract infection without hematuria, site unspecified  1-systemic sepsis due to:              -pneumonia bilateral with moderate left-sided pleural effusion and mild right-sided pleural effusion with risk of complications of pneumonia such as evolving empyema              -urinary tract infection in the setting of end-stage renal disease and minimal urinary output              -possible line sepsis due to infected hemodialysis catheter  2-end-stage renal disease on hemodialysis  3-reactive diabetes  4-anemia  5-rheumatoid arthritis  6-other diagnoses per primary team.   C&S  E fecalis    Recommendations/Discussions:    PO amoxil for 5 days  Will follow  Thank you  .  Alexei Dunn MD  3/25/2023  14:51 EDT    .

## 2023-03-26 LAB
ANION GAP SERPL CALCULATED.3IONS-SCNC: 10 MMOL/L (ref 5–15)
BASOPHILS # BLD AUTO: 0.07 10*3/MM3 (ref 0–0.2)
BASOPHILS NFR BLD AUTO: 0.6 % (ref 0–1.5)
BUN SERPL-MCNC: 15 MG/DL (ref 6–20)
BUN/CREAT SERPL: 9.2 (ref 7–25)
CALCIUM SPEC-SCNC: 7.9 MG/DL (ref 8.6–10.5)
CHLORIDE SERPL-SCNC: 99 MMOL/L (ref 98–107)
CO2 SERPL-SCNC: 20 MMOL/L (ref 22–29)
CREAT SERPL-MCNC: 1.63 MG/DL (ref 0.57–1)
DEPRECATED RDW RBC AUTO: 51.7 FL (ref 37–54)
EGFRCR SERPLBLD CKD-EPI 2021: 36.6 ML/MIN/1.73
EOSINOPHIL # BLD AUTO: 0.05 10*3/MM3 (ref 0–0.4)
EOSINOPHIL NFR BLD AUTO: 0.5 % (ref 0.3–6.2)
ERYTHROCYTE [DISTWIDTH] IN BLOOD BY AUTOMATED COUNT: 16.4 % (ref 12.3–15.4)
GLUCOSE BLDC GLUCOMTR-MCNC: 115 MG/DL (ref 70–130)
GLUCOSE BLDC GLUCOMTR-MCNC: 130 MG/DL (ref 70–130)
GLUCOSE BLDC GLUCOMTR-MCNC: 138 MG/DL (ref 70–130)
GLUCOSE BLDC GLUCOMTR-MCNC: 179 MG/DL (ref 70–130)
GLUCOSE SERPL-MCNC: 197 MG/DL (ref 65–99)
HCT VFR BLD AUTO: 30.7 % (ref 34–46.6)
HGB BLD-MCNC: 9.8 G/DL (ref 12–15.9)
IMM GRANULOCYTES # BLD AUTO: 0.08 10*3/MM3 (ref 0–0.05)
IMM GRANULOCYTES NFR BLD AUTO: 0.7 % (ref 0–0.5)
LYMPHOCYTES # BLD AUTO: 1.03 10*3/MM3 (ref 0.7–3.1)
LYMPHOCYTES NFR BLD AUTO: 9.3 % (ref 19.6–45.3)
MCH RBC QN AUTO: 27.7 PG (ref 26.6–33)
MCHC RBC AUTO-ENTMCNC: 31.9 G/DL (ref 31.5–35.7)
MCV RBC AUTO: 86.7 FL (ref 79–97)
MONOCYTES # BLD AUTO: 0.85 10*3/MM3 (ref 0.1–0.9)
MONOCYTES NFR BLD AUTO: 7.7 % (ref 5–12)
NEUTROPHILS NFR BLD AUTO: 81.2 % (ref 42.7–76)
NEUTROPHILS NFR BLD AUTO: 9 10*3/MM3 (ref 1.7–7)
NRBC BLD AUTO-RTO: 0 /100 WBC (ref 0–0.2)
PLATELET # BLD AUTO: 243 10*3/MM3 (ref 140–450)
PMV BLD AUTO: 9.6 FL (ref 6–12)
POTASSIUM SERPL-SCNC: 4.6 MMOL/L (ref 3.5–5.2)
RBC # BLD AUTO: 3.54 10*6/MM3 (ref 3.77–5.28)
SODIUM SERPL-SCNC: 129 MMOL/L (ref 136–145)
WBC NRBC COR # BLD: 11.08 10*3/MM3 (ref 3.4–10.8)

## 2023-03-26 PROCEDURE — 80048 BASIC METABOLIC PNL TOTAL CA: CPT | Performed by: HOSPITALIST

## 2023-03-26 PROCEDURE — 82962 GLUCOSE BLOOD TEST: CPT

## 2023-03-26 PROCEDURE — 25010000002 ONDANSETRON PER 1 MG: Performed by: HOSPITALIST

## 2023-03-26 PROCEDURE — 85025 COMPLETE CBC W/AUTO DIFF WBC: CPT | Performed by: HOSPITALIST

## 2023-03-26 PROCEDURE — 63710000001 INSULIN LISPRO (HUMAN) PER 5 UNITS: Performed by: HOSPITALIST

## 2023-03-26 RX ORDER — CARVEDILOL 6.25 MG/1
6.25 TABLET ORAL 2 TIMES DAILY WITH MEALS
Status: DISCONTINUED | OUTPATIENT
Start: 2023-03-26 | End: 2023-03-28 | Stop reason: HOSPADM

## 2023-03-26 RX ORDER — LIDOCAINE 50 MG/G
1 PATCH TOPICAL
Status: DISCONTINUED | OUTPATIENT
Start: 2023-03-26 | End: 2023-03-28 | Stop reason: HOSPADM

## 2023-03-26 RX ADMIN — ONDANSETRON 4 MG: 2 INJECTION INTRAMUSCULAR; INTRAVENOUS at 11:30

## 2023-03-26 RX ADMIN — INSULIN LISPRO 2 UNITS: 100 INJECTION, SOLUTION INTRAVENOUS; SUBCUTANEOUS at 08:32

## 2023-03-26 RX ADMIN — LEVETIRACETAM 250 MG: 250 TABLET, FILM COATED ORAL at 20:42

## 2023-03-26 RX ADMIN — PANTOPRAZOLE SODIUM 40 MG: 40 TABLET, DELAYED RELEASE ORAL at 08:34

## 2023-03-26 RX ADMIN — HYDROCODONE BITARTRATE AND ACETAMINOPHEN 1 TABLET: 5; 325 TABLET ORAL at 04:10

## 2023-03-26 RX ADMIN — LIDOCAINE 1 PATCH: 50 PATCH CUTANEOUS at 08:34

## 2023-03-26 RX ADMIN — AMLODIPINE BESYLATE 10 MG: 10 TABLET ORAL at 08:33

## 2023-03-26 RX ADMIN — AMOXICILLIN 500 MG: 250 CAPSULE ORAL at 15:16

## 2023-03-26 RX ADMIN — HYDROCODONE BITARTRATE AND ACETAMINOPHEN 1 TABLET: 5; 325 TABLET ORAL at 20:47

## 2023-03-26 RX ADMIN — ASPIRIN 81 MG: 81 TABLET, COATED ORAL at 08:33

## 2023-03-26 RX ADMIN — CARVEDILOL 3.12 MG: 3.12 TABLET, FILM COATED ORAL at 08:33

## 2023-03-26 RX ADMIN — LEVETIRACETAM 250 MG: 250 TABLET, FILM COATED ORAL at 08:33

## 2023-03-26 RX ADMIN — Medication 1 MG: at 08:33

## 2023-03-26 RX ADMIN — LIDOCAINE 1 PATCH: 50 PATCH CUTANEOUS at 11:29

## 2023-03-26 RX ADMIN — DESVENLAFAXINE SUCCINATE 25 MG: 25 TABLET, EXTENDED RELEASE ORAL at 08:33

## 2023-03-26 RX ADMIN — Medication 10 ML: at 08:32

## 2023-03-26 RX ADMIN — ROPINIROLE 2 MG: 2 TABLET, FILM COATED ORAL at 20:42

## 2023-03-26 RX ADMIN — CARVEDILOL 6.25 MG: 6.25 TABLET, FILM COATED ORAL at 20:42

## 2023-03-26 RX ADMIN — LEVOTHYROXINE SODIUM 300 MCG: 0.15 TABLET ORAL at 08:33

## 2023-03-26 NOTE — PROGRESS NOTES
"  Infectious Diseases Progress Note         [facility]  [place]: 6 days  Patient Identification:  Name: Zaina Martinez  Age: 57 y.o.  Sex: female  :  1965  MRN: [id number]         Primary Care Physician: Norman Serrato MD        Subjective: feeling better and have some discomfort in the chest and feels weak..  Interval History: See consultation note.    Objective:    Scheduled Meds:amLODIPine, 10 mg, Oral, Q24H  amoxicillin, 500 mg, Oral, Q24H  aspirin, 81 mg, Oral, Daily  carvedilol, 6.25 mg, Oral, BID With Meals  Desvenlafaxine Succinate ER, 25 mg, Oral, Daily  folic acid, 1 mg, Oral, Daily  insulin lispro, 0-7 Units, Subcutaneous, 4x Daily With Meals & Nightly  levETIRAcetam, 250 mg, Oral, BID  levothyroxine, 300 mcg, Oral, Daily  lidocaine, 1 patch, Transdermal, Q24H  lidocaine, 1 patch, Transdermal, Q24H  pantoprazole, 40 mg, Oral, Daily  rOPINIRole, 2 mg, Oral, Nightly      Continuous Infusions:     Vital signs in last 24 hours:  Temp:  [98.3 °F (36.8 °C)-98.6 °F (37 °C)] 98.3 °F (36.8 °C)  Heart Rate:  [72-88] 72  Resp:  [18] 18  BP: (155-168)/(81-93) 159/81    Intake/Output:  No intake or output data in the 24 hours ending 23 1732    Exam:  /81 (BP Location: Left arm, Patient Position: Lying)   Pulse 72   Temp 98.3 °F (36.8 °C) (Oral)   Resp 18   Ht 157.5 cm (62\")   Wt 61.1 kg (134 lb 11.2 oz)   SpO2 100%   BMI 24.64 kg/m²   Patient is examined using the personal protective equipment as per guidelines from infection control for this particular patient as enacted.  Hand washing was performed before and after patient interaction.  General Appearance:  Chronically ill-appearing female                          Head:    Normocephalic, without obvious abnormality, atraumatic                           Eyes:    PERRL, conjunctivae/corneas clear, EOM's intact, both eyes                         Throat:   Lips, tongue, gums normal; oral mucosa pink and moist                         "   Neck:   Supple, symmetrical, trachea midline, no JVD                         Lungs:    No obvious use of accessory muscles of breathing noted decreased breath sounds at the bases.                 Chest Wall:    No tenderness or deformity                          Heart:  S1-S2 irregular                  Abdomen:   Soft nontender                 Extremities:   Extremities normal, atraumatic, no cyanosis or edema                        Pulses:   Pulses palpable in all extremities                            Skin: Bruising and ecchymotic changes noted                  Neurologic: Withdrawn and weak and does not engage.       Data Review:    I reviewed the patient's new clinical results.  Results from last 7 days   Lab Units 03/26/23  0515 03/25/23  0316 03/24/23  0634 03/23/23  0518 03/22/23  0403 03/21/23  0327 03/20/23  1203   WBC 10*3/mm3 11.08* 11.76* 11.40* 12.45* 11.68* 13.93* 13.57*   HEMOGLOBIN g/dL 9.8* 10.0* 9.5* 9.8* 9.2* 9.2* 7.0*   PLATELETS 10*3/mm3 243 353 337 375 357 389 353     Results from last 7 days   Lab Units 03/26/23  0325 03/25/23  0316 03/24/23  0634 03/23/23  0518 03/22/23  0403 03/21/23  0327 03/20/23  1203   SODIUM mmol/L 129* 131* 129* 132* 133* 134* 133*   POTASSIUM mmol/L 4.6 4.4 3.4* 3.6 3.4* 4.3 4.2   CHLORIDE mmol/L 99 97* 97* 96* 98 93* 95*   CO2 mmol/L 20.0* 24.4 20.0* 24.0 26.0 26.6 25.7   BUN mg/dL 15 22* 16 24* 19 39* 33*   CREATININE mg/dL 1.63* 2.14* 1.60* 2.36* 1.92* 3.31* 3.11*   CALCIUM mg/dL 7.9* 8.8 8.2* 8.0* 7.7* 8.3* 8.2*   GLUCOSE mg/dL 197* 97 125* 124* 129* 88 214*     Microbiology Results (last 10 days)     Procedure Component Value - Date/Time    Urine Culture - Urine, Straight Cath [[id number]]  (Abnormal)  (Susceptibility) Collected: 03/21/23 1458    Lab Status: Final result Specimen: Urine from Straight Cath Updated: 03/23/23 1022     Urine Culture <25,000 CFU/mL Enterococcus faecalis    Narrative:      Colonization of the urinary tract without infection is common.  Treatment is discouraged unless the patient is symptomatic, pregnant, or undergoing an invasive urologic procedure.    Susceptibility      Enterococcus faecalis      ARSENIO      Ampicillin Susceptible      Levofloxacin Resistant     Nitrofurantoin Susceptible      Tetracycline Resistant     Vancomycin Susceptible                           Blood Culture - Blood, Arm, Right [[id number]]  (Normal) Collected: 03/20/23 1143    Lab Status: Final result Specimen: Blood from Arm, Right Updated: 03/25/23 1200     Blood Culture No growth at 5 days    Blood Culture - Blood, Arm, Left [[id number]]  (Normal) Collected: 03/20/23 1143    Lab Status: Final result Specimen: Blood from Arm, Left Updated: 03/25/23 1200     Blood Culture No growth at 5 days    Respiratory Panel PCR w/COVID-19(SARS-CoV-2) NAZ/SINAI/WES/PAD/COR/MAD/[facility]IGAIL In-House, NP Swab in UTM/VTM, 3-4 HR TAT - Swab, Nasopharynx [[id number]]  (Normal) Collected: 03/20/23 1122    Lab Status: Final result Specimen: Swab from Nasopharynx Updated: 03/20/23 1359     ADENOVIRUS, PCR Not Detected     Coronavirus 229E Not Detected     Coronavirus HKU1 Not Detected     Coronavirus NL63 Not Detected     Coronavirus OC43 Not Detected     COVID19 Not Detected     Human Metapneumovirus Not Detected     Human Rhinovirus/Enterovirus Not Detected     Influenza A PCR Not Detected     Influenza B PCR Not Detected     Parainfluenza Virus 1 Not Detected     Parainfluenza Virus 2 Not Detected     Parainfluenza Virus 3 Not Detected     Parainfluenza Virus 4 Not Detected     RSV, PCR Not Detected     Bordetella pertussis pcr Not Detected     Bordetella parapertussis PCR Not Detected     Chlamydophila pneumoniae PCR Not Detected     Mycoplasma pneumo by PCR Not Detected    Narrative:      In the setting of a positive respiratory panel with a viral infection PLUS a negative procalcitonin without other underlying concern for bacterial infection, consider observing off antibiotics or discontinuation of  antibiotics and continue supportive care. If the respiratory panel is positive for atypical bacterial infection (Bordetella pertussis, Chlamydophila pneumoniae, or Mycoplasma pneumoniae), consider antibiotic de-escalation to target atypical bacterial infection.    Urine Culture - Urine, Urine, Catheter [[id number]]  (Normal) Collected: 03/20/23 1122    Lab Status: Final result Specimen: Urine, Catheter Updated: 03/21/23 1237     Urine Culture No growth            Assessment:    Urinary tract infection without hematuria, site unspecified  1-systemic sepsis due to:              -pneumonia bilateral with moderate left-sided pleural effusion and mild right-sided pleural effusion with risk of complications of pneumonia such as evolving empyema              -urinary tract infection in the setting of end-stage renal disease and minimal urinary output              -possible line sepsis due to infected hemodialysis catheter  2-end-stage renal disease on hemodialysis  3-reactive diabetes  4-anemia  5-rheumatoid arthritis  6-other diagnoses per primary team.   C&S  E fecalis    Recommendations/Discussions:    PO amoxil for 5 days  Will follow  Thank you  .  Alexei Dunn MD  3/26/2023  17:32 EDT    .

## 2023-03-26 NOTE — PROGRESS NOTES
"Daily progress note    Primary care physician      Chief complaint  Doing same with no new complaint and family at bedside    History of present illness  57-year-old white female with very complex past medical history including end-stage renal disease on hemodialysis insulin-dependent diabetes mellitus congestive heart failure hypothyroidism hypertension right MCA stroke and chronic anemia presented to [facility] with altered mental status fever cough and generalized weakness.  Patient evaluated in ER found to have left basilar pneumonia and acute UTI with metabolic encephalopathy admit for management.  Patient able to answer all questions but most of the history obtained from the  old record nursing staff.  Patient has no chest pain increase shortness of breath palpitation nausea vomiting diarrhea.     REVIEW OF SYSTEMS  Unremarkable except pain and weakness    PHYSICAL EXAM  Blood pressure 159/81, pulse 72, temperature 98.3 °F (36.8 °C), temperature source Oral, resp. rate 18, height 157.5 cm (62\"), weight 61.1 kg (134 lb 11.2 oz), SpO2 100 %, not currently breastfeeding.    GENERAL:  Awake and alert ill-appearing lady, in no respiratory distress  HEENT:  Unremarkable  NECK:  Supple  CV: regular rhythm, normal rate, normal pulses  RESPIRATORY: normal effort, breath sounds diminished at the bases, few scattered rhonchi noted, a couple of nonproductive cough noted during exam.  ABDOMEN: soft nontender in all quadrants  MUSCULOSKELETAL: no deformity, no asymmetry  NEURO: alert, moves all extremities, follows commands, diffuse mild weakness to all extremities but no obvious focal motor deficit pattern evident.  PSYCH:  calm, cooperative  SKIN: warm, dry     LAB RESULTS  Lab Results (last 24 hours)     Procedure Component Value Units Date/Time    POC Glucose Once [[id number]]  (Normal) Collected: 03/26/23 1113    Specimen: Blood Updated: 03/26/23 1115     Glucose 130 mg/dL      Comment: Meter: " [id number] : [id number] Edgar SAAVEDRA       POC Glucose Once [[id number]]  (Abnormal) Collected: 03/26/23 0606    Specimen: Blood Updated: 03/26/23 0607     Glucose 179 mg/dL      Comment: Meter: [id number] : [id number] Vandana SAAVEDRA       CBC & Differential [[id number]]  (Abnormal) Collected: 03/26/23 0515    Specimen: Blood Updated: 03/26/23 0553    Narrative:      The following orders were created for panel order CBC & Differential.  Procedure                               Abnormality         Status                     ---------                               -----------         ------                     CBC Auto Differential[[id number]]        Abnormal            Final result                 Please view results for these tests on the individual orders.    CBC Auto Differential [[id number]]  (Abnormal) Collected: 03/26/23 0515    Specimen: Blood Updated: 03/26/23 0553     WBC 11.08 10*3/mm3      RBC 3.54 10*6/mm3      Hemoglobin 9.8 g/dL      Hematocrit 30.7 %      MCV 86.7 fL      MCH 27.7 pg      MCHC 31.9 g/dL      RDW 16.4 %      RDW-SD 51.7 fl      MPV 9.6 fL      Platelets 243 10*3/mm3      Neutrophil % 81.2 %      Lymphocyte % 9.3 %      Monocyte % 7.7 %      Eosinophil % 0.5 %      Basophil % 0.6 %      Immature Grans % 0.7 %      Neutrophils, Absolute 9.00 10*3/mm3      Lymphocytes, Absolute 1.03 10*3/mm3      Monocytes, Absolute 0.85 10*3/mm3      Eosinophils, Absolute 0.05 10*3/mm3      Basophils, Absolute 0.07 10*3/mm3      Immature Grans, Absolute 0.08 10*3/mm3      nRBC 0.0 /100 WBC     Basic Metabolic Panel [[id number]]  (Abnormal) Collected: 03/26/23 0325    Specimen: Blood Updated: 03/26/23 0516     Glucose 197 mg/dL      BUN 15 mg/dL      Creatinine 1.63 mg/dL      Sodium 129 mmol/L      Potassium 4.6 mmol/L      Comment: Slight hemolysis detected by analyzer. Results may be affected.        Chloride 99 mmol/L      CO2 20.0 mmol/L      Calcium 7.9 mg/dL      BUN/Creatinine Ratio 9.2      Anion Gap 10.0 mmol/L      eGFR 36.6 mL/min/1.73     Narrative:      GFR Normal >60  Chronic Kidney Disease <60  Kidney Failure <15      POC Glucose Once [[id number]]  (Abnormal) Collected: 03/25/23 2047    Specimen: Blood Updated: 03/25/23 2048     Glucose 180 mg/dL      Comment: Meter: [id number] : [id number] Vandana SAAVEDRA       POC Glucose Once [[id number]]  (Normal) Collected: 03/25/23 1640    Specimen: Blood Updated: 03/25/23 1642     Glucose 108 mg/dL      Comment: Meter: [id number] : [id number] Dequan SAAVEDRA           Imaging Results (Last 24 Hours)     ** No results found for the last 24 hours. **        ECG 12 Lead             Component  Ref Range & Units 11:12  (3/20/23) 1 mo ago  (2/5/23) 1 mo ago  (1/22/23) 1 mo ago  (1/22/23) 2 mo ago  (1/1/23) 2 mo ago  (12/26/22) 3 mo ago  (12/4/22)   QT Interval  ms 415 P  418  391  372  397  514  490    Resulting Agency BH ECG BH ECG BH ECG BH ECG  ECG  ECG  ECG             HEART RATE= 71  bpm  RR Interval= 845  ms  FL Interval= 160  ms  P Horizontal Axis= 61  deg  P Front Axis= 49  deg  QRSD Interval= 101  ms  QT Interval= 415  ms  QRS Axis= -33  deg  T Wave Axis= 43  deg  - OTHERWISE NORMAL ECG -  Sinus rhythm  Left axis deviation  Baseline wander in lead(s) I,II,III,aVR,aVF,V1,V4,V5             Current Facility-Administered Medications:   •  acetaminophen (TYLENOL) tablet 650 mg, 650 mg, Oral, Q6H PRN, Dimitri Pena MD, 650 mg at 03/20/23 1757  •  amLODIPine (NORVASC) tablet 10 mg, 10 mg, Oral, Q24H, Dimitri Pena MD, 10 mg at 03/26/23 0833  •  amoxicillin (AMOXIL) capsule 500 mg, 500 mg, Oral, Q24H, Alexei Dunn MD, 500 mg at 03/26/23 1516  •  aspirin EC tablet 81 mg, 81 mg, Oral, Daily, Dimitri Pena MD, 81 mg at 03/26/23 0833  •  carvedilol (COREG) tablet 6.25 mg, 6.25 mg, Oral, BID With Meals, Moises Lucero MD  •  Desvenlafaxine Succinate ER 25 mg, 25 mg, Oral, Daily, Dimitri Pena MD, 25 mg at 03/26/23 0833  •  folic acid (FOLVITE)  tablet 1 mg, 1 mg, Oral, Daily, Dimitri Pena MD, 1 mg at 03/26/23 0833  •  heparin (porcine) injection 4,000 Units, 4,000 Units, Intracatheter, PRN, Alejo Lange MD, 4,000 Units at 03/25/23 1046  •  HYDROcodone-acetaminophen (NORCO) 5-325 MG per tablet 1 tablet, 1 tablet, Oral, Q4H PRN, Dimitri Pena MD, 1 tablet at 03/26/23 0410  •  insulin lispro (ADMELOG) injection 0-7 Units, 0-7 Units, Subcutaneous, 4x Daily With Meals & Nightly, Dimitri Pena MD, 2 Units at 03/26/23 0832  •  levETIRAcetam (KEPPRA) tablet 250 mg, 250 mg, Oral, BID, Dimitri Pena MD, 250 mg at 03/26/23 0833  •  levothyroxine (SYNTHROID, LEVOTHROID) tablet 300 mcg, 300 mcg, Oral, Daily, Dimitri Pena MD, 300 mcg at 03/26/23 0833  •  lidocaine (LIDODERM) 5 % 1 patch, 1 patch, Transdermal, Q24H, Ruth Lira MD, 1 patch at 03/26/23 0834  •  lidocaine (LIDODERM) 5 % 1 patch, 1 patch, Transdermal, Q24H, Dimitri Pena MD, 1 patch at 03/26/23 1129  •  melatonin tablet 3 mg, 3 mg, Oral, Nightly PRN, Dimitri Pena MD  •  ondansetron (ZOFRAN) injection 4 mg, 4 mg, Intravenous, Q4H PRN, Dimitri Pena MD, 4 mg at 03/26/23 1130  •  pantoprazole (PROTONIX) EC tablet 40 mg, 40 mg, Oral, Daily, Dimitri Pena MD, 40 mg at 03/26/23 0834  •  rOPINIRole (REQUIP) tablet 2 mg, 2 mg, Oral, Nightly, Dimitri Pena MD, 2 mg at 03/25/23 2015  •  sennosides-docusate (PERICOLACE) 8.6-50 MG per tablet 2 tablet, 2 tablet, Oral, Nightly PRN, Dimitri Pena MD  •  [COMPLETED] Insert Peripheral IV, , , Once **AND** sodium chloride 0.9 % flush 10 mL, 10 mL, Intravenous, PRN, Andrea Ocampo MD, 10 mL at 03/26/23 0832    ASSESSMENT  Left basilar pneumonia  Acute Enterococcus UTI  Metabolic encephalopathy  Worsening anemia  End-stage renal disease on hemodialysis  Right MCA CVA  Diastolic congestive heart failure  Insulin-dependent diabetes mellitus  Hypertension   Hyperlipidemia  Hypothyroidism  Seizure disorder  Gastroesophageal reflux  disease    PLAN  CPM  Supplemental oxygen nebulizer  Continue antibiotics   Hemodialysis TIW  Transfuse as needed  Pain management  Adjust nursing home medications  Stress ulcer DVT prophylaxis  Infectious disease to follow patient  Supportive care  DNR and palliative care evaluation  PT OT  Discussed with family and nursing staff  Discharge planning    TERRELL DELVALLE MD    Copied text in this note has been reviewed and is accurate as of 03/26/23

## 2023-03-26 NOTE — PROGRESS NOTES
[facility] Progress Note      Patient Name: Zaina Martinez  : 1965  MRN: [id number]  Primary Care Physician:  Norman Serrato MD  Date of admission: 3/20/2023    Subjective     Interval History:   Follow up ESRD  Appetite very poor, remains too weak to get out of bed, has a little nausea this am  breathing appears comfortable at rest on room air  Hd yesterday was without incident      Review of Systems:   As noted above    Objective     Vitals:   Temp:  [97.6 °F (36.4 °C)-98.6 °F (37 °C)] 98.5 °F (36.9 °C)  Heart Rate:  [76-88] 76  Resp:  [16-18] 18  BP: (153-182)/(73-93) 168/93    Intake/Output Summary (Last 24 hours) at 3/26/2023 0920  Last data filed at 3/25/2023 1200  Gross per 24 hour   Intake --   Output 2400 ml   Net -2400 ml       Physical Exam:    General Appearance: very chronically ill and frail  Skin: warm and dry, pale  HEENT: oral mucosa moist.   Neck: RIJ TDC. Tunnel non-tender. No jvd  Lungs: upper airway sounds, dull in bases  Heart: RRR, no s3 or rub  Abdomen: soft, nontender, nondistended, very active bs  Extremities: no edema  Psychiatric: Flat affect and depressed mood  Neuro: Moves all extremities    Scheduled Meds:     amLODIPine, 10 mg, Oral, Q24H  amoxicillin, 500 mg, Oral, Q24H  aspirin, 81 mg, Oral, Daily  carvedilol, 3.125 mg, Oral, BID With Meals  Desvenlafaxine Succinate ER, 25 mg, Oral, Daily  folic acid, 1 mg, Oral, Daily  insulin lispro, 0-7 Units, Subcutaneous, 4x Daily With Meals & Nightly  levETIRAcetam, 250 mg, Oral, BID  levothyroxine, 300 mcg, Oral, Daily  lidocaine, 1 patch, Transdermal, Q24H  pantoprazole, 40 mg, Oral, Daily  rOPINIRole, 2 mg, Oral, Nightly      IV Meds:        Results Reviewed:   I have personally reviewed the results from the time of this admission to 3/26/2023 09:20 EDT     Results from last 7 days   Lab Units 23  0325 23  0316 23  0634 23  0403 23  0327 23  1203   SODIUM mmol/L 129*  131* 129*   < > 134* 133*   POTASSIUM mmol/L 4.6 4.4 3.4*   < > 4.3 4.2   CHLORIDE mmol/L 99 97* 97*   < > 93* 95*   CO2 mmol/L 20.0* 24.4 20.0*   < > 26.6 25.7   BUN mg/dL 15 22* 16   < > 39* 33*   CREATININE mg/dL 1.63* 2.14* 1.60*   < > 3.31* 3.11*   CALCIUM mg/dL 7.9* 8.8 8.2*   < > 8.3* 8.2*   BILIRUBIN mg/dL  --   --   --   --  0.4 0.3   ALK PHOS U/L  --   --   --   --  133* 137*   ALT (SGPT) U/L  --   --   --   --  7 <5   AST (SGOT) U/L  --   --   --   --  19 13   GLUCOSE mg/dL 197* 97 125*   < > 88 214*    < > = values in this interval not displayed.       Estimated Creatinine Clearance: 32.8 mL/min (A) (by C-G formula based on SCr of 1.63 mg/dL (H)).    Results from last 7 days   Lab Units 03/25/23  0316 03/23/23  0518 03/21/23  0327   MAGNESIUM mg/dL 1.9  --  1.8   PHOSPHORUS mg/dL 2.2* 2.9 4.0             Results from last 7 days   Lab Units 03/26/23  0515 03/25/23  0316 03/24/23  0634 03/23/23  0518 03/22/23  0403   WBC 10*3/mm3 11.08* 11.76* 11.40* 12.45* 11.68*   HEMOGLOBIN g/dL 9.8* 10.0* 9.5* 9.8* 9.2*   PLATELETS 10*3/mm3 243 353 337 375 357             Assessment / Plan     ASSESSMENT:  1. ESRD.  With progressively improving volume excess, next hd Tuesday unless needs it sooner  2. Sepsis with multiple sources, PNA, moderate left and small right pleural effusions, TDC, ?UTI (culture with insignificant growth)  3. Chronic diastolic heart failure, compensated  4. HTN is inadequately controlled.   5. Anemia ckd and blood loss. 1 unit  PRBC 3/20.   6. Left renal hematoma after left ureteral stent 12/30/22.  Left renal artery embolization 1/1/23.  Left stent removal 1/10/23.   7. Rib fractures:  right 6, left 8th.  8. Hypothyroid on replacement.   9. Labile DM2    PLAN:  1.  Increase carvedilol  2.  HD  TTS  3.  Palliative care meeting with family      Moises Lucero MD  03/26/23  09:20 EDT    [facility]  [phone]

## 2023-03-26 NOTE — CONSULTS
Purpose of the visit was to evaluate for: goals of care/advanced care planning and support for patient/family. Spoke with RN as well as patient and family and discussed palliative care and goals of care.      Assessment:  Patient is palliative care appropriate given ESRD on HD, UTI, h/o stroke, CHF, DM. Patient had c/o feeling nauseous, unmanaged anxiety, and pain in her ribs. PPS: 30%.     Recommendations/Plan: No changes to plan of care. Goal is home with spouse. [facility] referral for outpatient palliative support.     Other Comments:  Spoke to patient and spouse at bedside. They shared about patient's health issues and caregiver burden from multiple life stressors and limited support. Patient processed her declining health since starting diaylsis close to a year and half ago and frequent hospitalizations. She is not ready for hospice services but acknowledges need for better symptom control with pain, anxiety, and sleeping as well as ongoing GOC discussions. Patient and spouse are planning on her returning home as spouse noted that patient has not received adequate care at facilities. Spouse openly shared about need for additional supportive services given difficult life circumstances and ensuring that patient is receiving care to promote her quality of life. Both are agreeable to [facility] referral and they have also talked to them in the past. Provided psychosocial support to patient and family, advised of palliative availability and contact information.

## 2023-03-27 LAB
ANION GAP SERPL CALCULATED.3IONS-SCNC: 9.9 MMOL/L (ref 5–15)
BASOPHILS # BLD AUTO: 0.06 10*3/MM3 (ref 0–0.2)
BASOPHILS NFR BLD AUTO: 0.6 % (ref 0–1.5)
BUN SERPL-MCNC: 20 MG/DL (ref 6–20)
BUN/CREAT SERPL: 9 (ref 7–25)
CALCIUM SPEC-SCNC: 8.5 MG/DL (ref 8.6–10.5)
CHLORIDE SERPL-SCNC: 96 MMOL/L (ref 98–107)
CO2 SERPL-SCNC: 20.1 MMOL/L (ref 22–29)
CREAT SERPL-MCNC: 2.23 MG/DL (ref 0.57–1)
DEPRECATED RDW RBC AUTO: 52.4 FL (ref 37–54)
EGFRCR SERPLBLD CKD-EPI 2021: 25.2 ML/MIN/1.73
EOSINOPHIL # BLD AUTO: 0.14 10*3/MM3 (ref 0–0.4)
EOSINOPHIL NFR BLD AUTO: 1.4 % (ref 0.3–6.2)
ERYTHROCYTE [DISTWIDTH] IN BLOOD BY AUTOMATED COUNT: 17 % (ref 12.3–15.4)
GLUCOSE BLDC GLUCOMTR-MCNC: 100 MG/DL (ref 70–130)
GLUCOSE BLDC GLUCOMTR-MCNC: 100 MG/DL (ref 70–130)
GLUCOSE BLDC GLUCOMTR-MCNC: 101 MG/DL (ref 70–130)
GLUCOSE BLDC GLUCOMTR-MCNC: 89 MG/DL (ref 70–130)
GLUCOSE SERPL-MCNC: 86 MG/DL (ref 65–99)
HCT VFR BLD AUTO: 30.4 % (ref 34–46.6)
HGB BLD-MCNC: 9.9 G/DL (ref 12–15.9)
IMM GRANULOCYTES # BLD AUTO: 0.09 10*3/MM3 (ref 0–0.05)
IMM GRANULOCYTES NFR BLD AUTO: 0.9 % (ref 0–0.5)
LYMPHOCYTES # BLD AUTO: 1.34 10*3/MM3 (ref 0.7–3.1)
LYMPHOCYTES NFR BLD AUTO: 13.4 % (ref 19.6–45.3)
MCH RBC QN AUTO: 27.4 PG (ref 26.6–33)
MCHC RBC AUTO-ENTMCNC: 32.6 G/DL (ref 31.5–35.7)
MCV RBC AUTO: 84.2 FL (ref 79–97)
MONOCYTES # BLD AUTO: 0.84 10*3/MM3 (ref 0.1–0.9)
MONOCYTES NFR BLD AUTO: 8.4 % (ref 5–12)
NEUTROPHILS NFR BLD AUTO: 7.51 10*3/MM3 (ref 1.7–7)
NEUTROPHILS NFR BLD AUTO: 75.3 % (ref 42.7–76)
NRBC BLD AUTO-RTO: 0.1 /100 WBC (ref 0–0.2)
PLATELET # BLD AUTO: 245 10*3/MM3 (ref 140–450)
PMV BLD AUTO: 9.3 FL (ref 6–12)
POTASSIUM SERPL-SCNC: 4.7 MMOL/L (ref 3.5–5.2)
RBC # BLD AUTO: 3.61 10*6/MM3 (ref 3.77–5.28)
SODIUM SERPL-SCNC: 126 MMOL/L (ref 136–145)
WBC NRBC COR # BLD: 9.98 10*3/MM3 (ref 3.4–10.8)

## 2023-03-27 PROCEDURE — 85025 COMPLETE CBC W/AUTO DIFF WBC: CPT | Performed by: HOSPITALIST

## 2023-03-27 PROCEDURE — 82962 GLUCOSE BLOOD TEST: CPT

## 2023-03-27 PROCEDURE — 80048 BASIC METABOLIC PNL TOTAL CA: CPT | Performed by: HOSPITALIST

## 2023-03-27 RX ADMIN — AMLODIPINE BESYLATE 10 MG: 10 TABLET ORAL at 08:34

## 2023-03-27 RX ADMIN — ROPINIROLE 2 MG: 2 TABLET, FILM COATED ORAL at 20:42

## 2023-03-27 RX ADMIN — LEVOTHYROXINE SODIUM 300 MCG: 0.15 TABLET ORAL at 08:34

## 2023-03-27 RX ADMIN — HYDROCODONE BITARTRATE AND ACETAMINOPHEN 1 TABLET: 5; 325 TABLET ORAL at 08:57

## 2023-03-27 RX ADMIN — CARVEDILOL 6.25 MG: 6.25 TABLET, FILM COATED ORAL at 08:34

## 2023-03-27 RX ADMIN — ASPIRIN 81 MG: 81 TABLET, COATED ORAL at 08:34

## 2023-03-27 RX ADMIN — LEVETIRACETAM 250 MG: 250 TABLET, FILM COATED ORAL at 08:34

## 2023-03-27 RX ADMIN — LIDOCAINE 1 PATCH: 50 PATCH CUTANEOUS at 08:35

## 2023-03-27 RX ADMIN — PANTOPRAZOLE SODIUM 40 MG: 40 TABLET, DELAYED RELEASE ORAL at 08:34

## 2023-03-27 RX ADMIN — DESVENLAFAXINE SUCCINATE 25 MG: 25 TABLET, EXTENDED RELEASE ORAL at 11:43

## 2023-03-27 RX ADMIN — CARVEDILOL 6.25 MG: 6.25 TABLET, FILM COATED ORAL at 17:17

## 2023-03-27 RX ADMIN — AMOXICILLIN 500 MG: 250 CAPSULE ORAL at 15:54

## 2023-03-27 RX ADMIN — Medication 1 MG: at 08:57

## 2023-03-27 RX ADMIN — LEVETIRACETAM 250 MG: 250 TABLET, FILM COATED ORAL at 20:42

## 2023-03-27 RX ADMIN — LIDOCAINE 1 PATCH: 50 PATCH CUTANEOUS at 08:36

## 2023-03-27 RX ADMIN — HYDROCODONE BITARTRATE AND ACETAMINOPHEN 1 TABLET: 5; 325 TABLET ORAL at 20:42

## 2023-03-27 NOTE — PROGRESS NOTES
"Daily progress note    Primary care physician      Chief complaint  Doing same with no new complaint and family at bedside    History of present illness  57-year-old white female with very complex past medical history including end-stage renal disease on hemodialysis insulin-dependent diabetes mellitus congestive heart failure hypothyroidism hypertension right MCA stroke and chronic anemia presented to [facility] with altered mental status fever cough and generalized weakness.  Patient evaluated in ER found to have left basilar pneumonia and acute UTI with metabolic encephalopathy admit for management.  Patient able to answer all questions but most of the history obtained from the  old record nursing staff.  Patient has no chest pain increase shortness of breath palpitation nausea vomiting diarrhea.     REVIEW OF SYSTEMS  Unremarkable except pain and weakness    PHYSICAL EXAM  Blood pressure 157/87, pulse 72, temperature 98.4 °F (36.9 °C), temperature source Oral, resp. rate 18, height 157.5 cm (62\"), weight 63.5 kg (139 lb 15.9 oz), SpO2 99 %, not currently breastfeeding.    GENERAL:  Awake and alert ill-appearing lady, in no respiratory distress  HEENT:  Unremarkable  NECK:  Supple  CV: regular rhythm, normal rate, normal pulses  RESPIRATORY: normal effort, breath sounds diminished at the bases, few scattered rhonchi noted, a couple of nonproductive cough noted during exam.  ABDOMEN: soft nontender in all quadrants  MUSCULOSKELETAL: no deformity, no asymmetry  NEURO: alert, moves all extremities, follows commands, diffuse mild weakness to all extremities but no obvious focal motor deficit pattern evident.  PSYCH:  calm, cooperative  SKIN: warm, dry     LAB RESULTS  Lab Results (last 24 hours)     Procedure Component Value Units Date/Time    POC Glucose Once [[id number]]  (Normal) Collected: 03/27/23 1111    Specimen: Blood Updated: 03/27/23 1113     Glucose 101 mg/dL      Comment: Meter: " [id number] : [id number] Zully SAAVEDRA       POC Glucose Once [[id number]]  (Normal) Collected: 03/27/23 0604    Specimen: Blood Updated: 03/27/23 0610     Glucose 89 mg/dL      Comment: Meter: [id number] : [id number] Omar Cortez RN       Basic Metabolic Panel [[id number]]  (Abnormal) Collected: 03/27/23 0501    Specimen: Blood Updated: 03/27/23 0609     Glucose 86 mg/dL      BUN 20 mg/dL      Creatinine 2.23 mg/dL      Sodium 126 mmol/L      Potassium 4.7 mmol/L      Comment: Slight hemolysis detected by analyzer. Results may be affected.        Chloride 96 mmol/L      CO2 20.1 mmol/L      Calcium 8.5 mg/dL      BUN/Creatinine Ratio 9.0     Anion Gap 9.9 mmol/L      eGFR 25.2 mL/min/1.73     Narrative:      GFR Normal >60  Chronic Kidney Disease <60  Kidney Failure <15      CBC & Differential [[id number]]  (Abnormal) Collected: 03/27/23 0501    Specimen: Blood Updated: 03/27/23 0536    Narrative:      The following orders were created for panel order CBC & Differential.  Procedure                               Abnormality         Status                     ---------                               -----------         ------                     CBC Auto Differential[[id number]]        Abnormal            Final result                 Please view results for these tests on the individual orders.    CBC Auto Differential [[id number]]  (Abnormal) Collected: 03/27/23 0501    Specimen: Blood Updated: 03/27/23 0536     WBC 9.98 10*3/mm3      RBC 3.61 10*6/mm3      Hemoglobin 9.9 g/dL      Hematocrit 30.4 %      MCV 84.2 fL      MCH 27.4 pg      MCHC 32.6 g/dL      RDW 17.0 %      RDW-SD 52.4 fl      MPV 9.3 fL      Platelets 245 10*3/mm3      Neutrophil % 75.3 %      Lymphocyte % 13.4 %      Monocyte % 8.4 %      Eosinophil % 1.4 %      Basophil % 0.6 %      Immature Grans % 0.9 %      Neutrophils, Absolute 7.51 10*3/mm3      Lymphocytes, Absolute 1.34 10*3/mm3      Monocytes, Absolute 0.84 10*3/mm3       Eosinophils, Absolute 0.14 10*3/mm3      Basophils, Absolute 0.06 10*3/mm3      Immature Grans, Absolute 0.09 10*3/mm3      nRBC 0.1 /100 WBC     POC Glucose Once [[id number]]  (Normal) Collected: 03/26/23 2129    Specimen: Blood Updated: 03/26/23 2134     Glucose 115 mg/dL      Comment: Meter: [id number] : [id number] Vandana Oneill QUENTIN           Imaging Results (Last 24 Hours)     ** No results found for the last 24 hours. **        ECG 12 Lead             Component  Ref Range & Units 11:12  (3/20/23) 1 mo ago  (2/5/23) 1 mo ago  (1/22/23) 1 mo ago  (1/22/23) 2 mo ago  (1/1/23) 2 mo ago  (12/26/22) 3 mo ago  (12/4/22)   QT Interval  ms 415 P  418  391  372  397  514  490    Resulting Agency  ECG BH ECG BH ECG  ECG  ECG  ECG  ECG             HEART RATE= 71  bpm  RR Interval= 845  ms  OR Interval= 160  ms  P Horizontal Axis= 61  deg  P Front Axis= 49  deg  QRSD Interval= 101  ms  QT Interval= 415  ms  QRS Axis= -33  deg  T Wave Axis= 43  deg  - OTHERWISE NORMAL ECG -  Sinus rhythm  Left axis deviation  Baseline wander in lead(s) I,II,III,aVR,aVF,V1,V4,V5             Current Facility-Administered Medications:   •  acetaminophen (TYLENOL) tablet 650 mg, 650 mg, Oral, Q6H PRN, Dimitri Pena MD, 650 mg at 03/20/23 1757  •  amLODIPine (NORVASC) tablet 10 mg, 10 mg, Oral, Q24H, Dimitri Pena MD, 10 mg at 03/27/23 0834  •  amoxicillin (AMOXIL) capsule 500 mg, 500 mg, Oral, Q24H, Alexei Dunn MD, 500 mg at 03/27/23 1554  •  aspirin EC tablet 81 mg, 81 mg, Oral, Daily, Dimitri Pena MD, 81 mg at 03/27/23 0834  •  carvedilol (COREG) tablet 6.25 mg, 6.25 mg, Oral, BID With Meals, Moises Lucero MD, 6.25 mg at 03/27/23 0834  •  Desvenlafaxine Succinate ER 25 mg, 25 mg, Oral, Daily, Dimitri Pena MD, 25 mg at 03/27/23 1143  •  folic acid (FOLVITE) tablet 1 mg, 1 mg, Oral, Daily, Dimitri Pena MD, 1 mg at 03/27/23 0857  •  heparin (porcine) injection 4,000 Units, 4,000 Units, Intracatheter, PRN, Alejo Lange  MD Kirit, 4,000 Units at 03/25/23 1046  •  HYDROcodone-acetaminophen (NORCO) 5-325 MG per tablet 1 tablet, 1 tablet, Oral, Q4H PRN, Dimitri Pena MD, 1 tablet at 03/27/23 0857  •  insulin lispro (ADMELOG) injection 0-7 Units, 0-7 Units, Subcutaneous, 4x Daily With Meals & Nightly, Dimitri Pena MD, 2 Units at 03/26/23 0832  •  levETIRAcetam (KEPPRA) tablet 250 mg, 250 mg, Oral, BID, Dimitri Pena MD, 250 mg at 03/27/23 0834  •  levothyroxine (SYNTHROID, LEVOTHROID) tablet 300 mcg, 300 mcg, Oral, Daily, Dimitri Pena MD, 300 mcg at 03/27/23 0834  •  lidocaine (LIDODERM) 5 % 1 patch, 1 patch, Transdermal, Q24H, Ruth Lira MD, 1 patch at 03/27/23 0836  •  lidocaine (LIDODERM) 5 % 1 patch, 1 patch, Transdermal, Q24H, Dimitri Pena MD, 1 patch at 03/27/23 0835  •  melatonin tablet 3 mg, 3 mg, Oral, Nightly PRN, Dimitri Pena MD  •  ondansetron (ZOFRAN) injection 4 mg, 4 mg, Intravenous, Q4H PRN, Dimitri Pena MD, 4 mg at 03/26/23 1130  •  pantoprazole (PROTONIX) EC tablet 40 mg, 40 mg, Oral, Daily, Dimitri Pena MD, 40 mg at 03/27/23 0834  •  rOPINIRole (REQUIP) tablet 2 mg, 2 mg, Oral, Nightly, Dimitri Pena MD, 2 mg at 03/26/23 2042  •  sennosides-docusate (PERICOLACE) 8.6-50 MG per tablet 2 tablet, 2 tablet, Oral, Nightly PRN, Dimitri Pena MD  •  [COMPLETED] Insert Peripheral IV, , , Once **AND** sodium chloride 0.9 % flush 10 mL, 10 mL, Intravenous, PRN, Andrea Ocampo MD, 10 mL at 03/26/23 0832    ASSESSMENT  Left basilar pneumonia  Acute Enterococcus UTI  Metabolic encephalopathy  Worsening anemia  End-stage renal disease on hemodialysis  Right MCA CVA  Diastolic congestive heart failure  Insulin-dependent diabetes mellitus  Hypertension   Hyperlipidemia  Hypothyroidism  Seizure disorder  Gastroesophageal reflux disease    PLAN  CPM  Supplemental oxygen nebulizer  Continue antibiotics   Hemodialysis TIW  Transfuse as needed  Pain management  Adjust nursing home medications  Stress ulcer DVT  prophylaxis  Infectious disease to follow patient  Supportive care  DNR but patient and family not ready for comfort care  PT OT  Discussed with family and nursing staff  Discharge planning    TERRELL DELVALLE MD    Copied text in this note has been reviewed and is accurate as of 03/27/23

## 2023-03-27 NOTE — SIGNIFICANT NOTE
03/27/23 1451   OTHER   Discipline physical therapist   Rehab Time/Intention   Session Not Performed patient/family declined treatment  (Patient declined participation in PT. Patient states she is going home tomorrow with her . Patient has not sat up since working with therapy last week. Educated on importance of mobility. PT will f/u.)   Recommendation   PT - Next Appointment 03/28/23

## 2023-03-27 NOTE — PROGRESS NOTES
"  Infectious Diseases Progress Note    Debbie Quick MD     [facility]  [place]: 7 days  Patient Identification:  Name: Zaina Martinez  Age: 57 y.o.  Sex: female  :  1965  MRN: [id number]         Primary Care Physician: Norman Serrato MD        Subjective: Feeling better and breathing better..  Interval History: See consultation note.    Objective:    Scheduled Meds:amLODIPine, 10 mg, Oral, Q24H  amoxicillin, 500 mg, Oral, Q24H  aspirin, 81 mg, Oral, Daily  carvedilol, 6.25 mg, Oral, BID With Meals  Desvenlafaxine Succinate ER, 25 mg, Oral, Daily  folic acid, 1 mg, Oral, Daily  insulin lispro, 0-7 Units, Subcutaneous, 4x Daily With Meals & Nightly  levETIRAcetam, 250 mg, Oral, BID  levothyroxine, 300 mcg, Oral, Daily  lidocaine, 1 patch, Transdermal, Q24H  lidocaine, 1 patch, Transdermal, Q24H  pantoprazole, 40 mg, Oral, Daily  rOPINIRole, 2 mg, Oral, Nightly      Continuous Infusions:     Vital signs in last 24 hours:  Temp:  [98.3 °F (36.8 °C)-98.8 °F (37.1 °C)] 98.5 °F (36.9 °C)  Heart Rate:  [72-74] 74  Resp:  [18-20] 20  BP: (149-171)/(78-85) 171/85    Intake/Output:  No intake or output data in the 24 hours ending 23 1220    Exam:  /85 (BP Location: Left arm, Patient Position: Lying)   Pulse 74   Temp 98.5 °F (36.9 °C) (Oral)   Resp 20   Ht 157.5 cm (62\")   Wt 63.5 kg (139 lb 15.9 oz)   SpO2 99%   BMI 25.60 kg/m²   Patient is examined using the personal protective equipment as per guidelines from infection control for this particular patient as enacted.  Hand washing was performed before and after patient interaction.  General Appearance:  Chronically ill-appearing female                          Head:    Normocephalic, without obvious abnormality, atraumatic                           Eyes:    PERRL, conjunctivae/corneas clear, EOM's intact, both eyes                         Throat:   Lips, tongue, gums normal; oral mucosa pink and moist                           Neck:   " Supple, symmetrical, trachea midline, no JVD                         Lungs:    No obvious use of accessory muscles of breathing noted decreased breath sounds at the bases.                 Chest Wall:    No tenderness or deformity                          Heart:  S1-S2 irregular                  Abdomen:   Soft nontender                 Extremities:   Extremities normal, atraumatic, no cyanosis or edema                        Pulses:   Pulses palpable in all extremities                            Skin: Bruising and ecchymotic changes noted                  Neurologic: Withdrawn and weak and does not engage.       Data Review:    I reviewed the patient's new clinical results.  Results from last 7 days   Lab Units 03/27/23  0501 03/26/23  0515 03/25/23  0316 03/24/23  0634 03/23/23  0518 03/22/23  0403 03/21/23  0327   WBC 10*3/mm3 9.98 11.08* 11.76* 11.40* 12.45* 11.68* 13.93*   HEMOGLOBIN g/dL 9.9* 9.8* 10.0* 9.5* 9.8* 9.2* 9.2*   PLATELETS 10*3/mm3 245 243 353 337 375 357 389     Results from last 7 days   Lab Units 03/27/23  0501 03/26/23  0325 03/25/23  0316 03/24/23  0634 03/23/23  0518 03/22/23  0403 03/21/23  0327   SODIUM mmol/L 126* 129* 131* 129* 132* 133* 134*   POTASSIUM mmol/L 4.7 4.6 4.4 3.4* 3.6 3.4* 4.3   CHLORIDE mmol/L 96* 99 97* 97* 96* 98 93*   CO2 mmol/L 20.1* 20.0* 24.4 20.0* 24.0 26.0 26.6   BUN mg/dL 20 15 22* 16 24* 19 39*   CREATININE mg/dL 2.23* 1.63* 2.14* 1.60* 2.36* 1.92* 3.31*   CALCIUM mg/dL 8.5* 7.9* 8.8 8.2* 8.0* 7.7* 8.3*   GLUCOSE mg/dL 86 197* 97 125* 124* 129* 88     Microbiology Results (last 10 days)     Procedure Component Value - Date/Time    Urine Culture - Urine, Straight Cath [[id number]]  (Abnormal)  (Susceptibility) Collected: 03/21/23 1450    Lab Status: Final result Specimen: Urine from Straight Cath Updated: 03/23/23 1022     Urine Culture <25,000 CFU/mL Enterococcus faecalis    Narrative:      Colonization of the urinary tract without infection is common. Treatment is  discouraged unless the patient is symptomatic, pregnant, or undergoing an invasive urologic procedure.    Susceptibility      Enterococcus faecalis      ARSENIO      Ampicillin Susceptible      Levofloxacin Resistant     Nitrofurantoin Susceptible      Tetracycline Resistant     Vancomycin Susceptible                           Blood Culture - Blood, Arm, Right [[id number]]  (Normal) Collected: 03/20/23 1143    Lab Status: Final result Specimen: Blood from Arm, Right Updated: 03/25/23 1200     Blood Culture No growth at 5 days    Blood Culture - Blood, Arm, Left [[id number]]  (Normal) Collected: 03/20/23 1143    Lab Status: Final result Specimen: Blood from Arm, Left Updated: 03/25/23 1200     Blood Culture No growth at 5 days    Respiratory Panel PCR w/COVID-19(SARS-CoV-2) [facility] In-House, NP Swab in UTM/VTM, 3-4 HR TAT - Swab, Nasopharynx [[id number]]  (Normal) Collected: 03/20/23 1122    Lab Status: Final result Specimen: Swab from Nasopharynx Updated: 03/20/23 1359     ADENOVIRUS, PCR Not Detected     Coronavirus 229E Not Detected     Coronavirus HKU1 Not Detected     Coronavirus NL63 Not Detected     Coronavirus OC43 Not Detected     COVID19 Not Detected     Human Metapneumovirus Not Detected     Human Rhinovirus/Enterovirus Not Detected     Influenza A PCR Not Detected     Influenza B PCR Not Detected     Parainfluenza Virus 1 Not Detected     Parainfluenza Virus 2 Not Detected     Parainfluenza Virus 3 Not Detected     Parainfluenza Virus 4 Not Detected     RSV, PCR Not Detected     Bordetella pertussis pcr Not Detected     Bordetella parapertussis PCR Not Detected     Chlamydophila pneumoniae PCR Not Detected     Mycoplasma pneumo by PCR Not Detected    Narrative:      In the setting of a positive respiratory panel with a viral infection PLUS a negative procalcitonin without other underlying concern for bacterial infection, consider observing off antibiotics or discontinuation of antibiotics and  continue supportive care. If the respiratory panel is positive for atypical bacterial infection (Bordetella pertussis, Chlamydophila pneumoniae, or Mycoplasma pneumoniae), consider antibiotic de-escalation to target atypical bacterial infection.    Urine Culture - Urine, Urine, Catheter [[id number]]  (Normal) Collected: 03/20/23 1122    Lab Status: Final result Specimen: Urine, Catheter Updated: 03/21/23 1237     Urine Culture No growth            Assessment:    Urinary tract infection without hematuria, site unspecified  1-systemic sepsis due to:              -pneumonia bilateral with moderate left-sided pleural effusion and mild right-sided pleural effusion with risk of complications of pneumonia such as evolving empyema              -urinary tract infection in the setting of end-stage renal disease and minimal urinary output              -possible line sepsis due to infected hemodialysis catheter  2-end-stage renal disease on hemodialysis  3-reactive diabetes  4-anemia  5-rheumatoid arthritis  6-other diagnoses per primary team.     Recommendations/Discussions:  Continue present care plan as per Dr. Dunn on 3/26/2023.  Debbie Quick MD  3/27/2023  12:20 EDT    Parts of this note may be an electronic transcription/translation of spoken language to printed text using the Dragon dictation system.

## 2023-03-27 NOTE — PLAN OF CARE
Goal Outcome Evaluation:               No acute distress noted this shift, safety maintained, continue plan of care  
Goal Outcome Evaluation:               Pt medicated with prn pain meds as needed. Vss. HD cath site dry and intact. Turned q2hr, cont to monitor  
Goal Outcome Evaluation:            No acute distress noted this shift, safety maintained, continue plan of care     
Goal Outcome Evaluation:            No acute distress noted this shift, safety maintained, continue plan of care     
Goal Outcome Evaluation:    Patient is a 57 y.o. female admitted to [facility] for UTI without hematuria, pleural effusion, and PNA on 3/20/2023. PMHx includes multiple hospital admissions and subacute rehabilitation stays. Patient very tearful upon PT arrival. Patient stated her  was helping stand her at home and they both heard a pop in her ribs- noted acute right sixth and left eighth rib fractures anteriorly per imaging report. Patient reports she is primarily nonambulatory and uses a wheelchair at baseline and lives at home with spouse. Patient confirms either spouse or daughter are with her and assist with all transfers. Today, patient performed bed mobility with max-dependent x 2. Declined attempting EOB due to 9/10 pain level- RN notified. Pending activity tolerance, patient may benefit from skilled PT services acutely to address functional deficits as well as improve level of independence prior to discharge. Patient recently discharged from rehab a couple days before hospital admission and  it appears likely due to out of rehab days or limited progress. Anticipate returning home with 24/7 assistance from family and possible HH PT upon DC. PT will follow peripherally.                   
Goal Outcome Evaluation:   Dialysis today, -2L. IV abx continued. Dr. Montero ordered another straight cath UA which was obtained. Patient urine is completely cloudy, yellow/milky color with fowl odor. Pt is oliguric as it is and UA was analyzed as a loaded field so no bacteria is able to be identified. XR ribs obtained, results px. Norco was started for her rib pain. Weaned to 1L NC. q2t when in her bed, bottom is blanchable red. Discharge plan TBD but patient would like to return home.               
Goal Outcome Evaluation:   Patient A/O x4. 1L NC to maintain sats >90%. PRN Norco given and Lidocaine patches applied for pain related to rib fractures. IS instructions and FU done. IV antibiotics infused. Safety maintained.       
Goal Outcome Evaluation:  Plan of Care Reviewed With: patient           Outcome Evaluation: Patient seen for PT treatement this PM. Patient supine in bed upon arrival, alert, and agreeable to participation in PT this date. Patient sat up to EOB with modAx2. VCs needed for sequencing. Patient able to move legs to EOB but required assistance with moving trunk upright and scoot out so that feet were on the floor. Patient sat at EOB with SBA-CGA. Patient sat EOB for ~8 minutes. Patient performed STS from EOB with MaxAx2. Patient demonstrated a strong posterior lean in standing. Patient stood for ~8 seconds before returning to sitting with reports of rib pain. Patient required maxAx2 to return to supine and scoot up in bed. Patient would continue to benefit from skilled PT intervention to address deficits in functional mobility. PT will continue to monitor.  
Goal Outcome Evaluation:  Plan of Care Reviewed With: patient           Outcome Evaluation: Pt seen for OT eval this date. Pt admitted ith UTI. Sustained recent rib fx from  tranferring her which is limiting participation due to pain. Pt has reoccurring history of hospital, snf stays. Most recently dc from rehab/snf 4 days ago and is now back in hospital. Pt required assist with adls from  and used wheelchair for all fm. Pt declined to participate in any functional mobility this date due to increased pain in side due to rib fx. Pt did participate in grooming sitting with head inclined. Required set up for oral and hair care. B UEs wfl for ROM. B UE strength 3+/5. Pt demonstrating poor functional endurance, strength below basline which is impacting abilty to perform functional activities. Will benefit from acute OT to address. Pt will likely need snf, hh at dc OT wore appropriate PPE during session and performed hand hygiene before/after session.    
Goal Outcome Evaluation:  Plan of Care Reviewed With: patient        Progress: improving  Outcome Evaluation: Pt AOx4. Underwent dialysis today. C/o rib pain with moving and sliding up in bed. Encouraged IS use, best of 800. Informed her of the risks of rib fractures and not taking deep breaths. Decreased appetite. VSS. Safety maintained.  
Goal Outcome Evaluation:  Plan of Care Reviewed With: patient  VSS  [place] as noted-pt received no insulin today-eating small amounts  Medicated with norco x 1 for generalized pain with good relief  Pt encouraged to turn every 2 hours -skin care to coccyx-explained to pt that she needed to stay off her bottom but she verbalized she wasn't sure if she wanted to because she was uncomfortable in other positions  Safety maintained this shift        Progress: no change     
Goal Outcome Evaluation:  Plan of Care Reviewed With: patient, spouse        Progress: improving  Outcome Evaluation: Pt had some nausea after breakfast relieved with zofran. Pt with low appetite. No falls or new skin issues. states lidocaine patches help her bilat rib pain. Was tolerating room air until she requested oxygen back on later in the day. Sats stable on or off oxygen today.   
No

## 2023-03-28 ENCOUNTER — READMISSION MANAGEMENT (OUTPATIENT)
Dept: CALL CENTER | Facility: HOSPITAL | Age: 58
End: 2023-03-28
Payer: MEDICARE

## 2023-03-28 VITALS
HEART RATE: 83 BPM | TEMPERATURE: 98.5 F | SYSTOLIC BLOOD PRESSURE: 177 MMHG | BODY MASS INDEX: 26.37 KG/M2 | WEIGHT: 143.3 LBS | DIASTOLIC BLOOD PRESSURE: 97 MMHG | OXYGEN SATURATION: 97 % | RESPIRATION RATE: 18 BRPM | HEIGHT: 62 IN

## 2023-03-28 LAB
ALBUMIN SERPL-MCNC: 2.3 G/DL (ref 3.5–5.2)
ANION GAP SERPL CALCULATED.3IONS-SCNC: 8 MMOL/L (ref 5–15)
BASOPHILS # BLD AUTO: 0.06 10*3/MM3 (ref 0–0.2)
BASOPHILS NFR BLD AUTO: 0.6 % (ref 0–1.5)
BUN SERPL-MCNC: 25 MG/DL (ref 6–20)
BUN/CREAT SERPL: 9.7 (ref 7–25)
CALCIUM SPEC-SCNC: 7.9 MG/DL (ref 8.6–10.5)
CHLORIDE SERPL-SCNC: 100 MMOL/L (ref 98–107)
CO2 SERPL-SCNC: 22 MMOL/L (ref 22–29)
CREAT SERPL-MCNC: 2.57 MG/DL (ref 0.57–1)
DEPRECATED RDW RBC AUTO: 51.5 FL (ref 37–54)
EGFRCR SERPLBLD CKD-EPI 2021: 21.2 ML/MIN/1.73
EOSINOPHIL # BLD AUTO: 0.13 10*3/MM3 (ref 0–0.4)
EOSINOPHIL NFR BLD AUTO: 1.3 % (ref 0.3–6.2)
ERYTHROCYTE [DISTWIDTH] IN BLOOD BY AUTOMATED COUNT: 17 % (ref 12.3–15.4)
GLUCOSE BLDC GLUCOMTR-MCNC: 208 MG/DL (ref 70–130)
GLUCOSE BLDC GLUCOMTR-MCNC: 92 MG/DL (ref 70–130)
GLUCOSE SERPL-MCNC: 95 MG/DL (ref 65–99)
HCT VFR BLD AUTO: 30.6 % (ref 34–46.6)
HGB BLD-MCNC: 9.7 G/DL (ref 12–15.9)
IMM GRANULOCYTES # BLD AUTO: 0.09 10*3/MM3 (ref 0–0.05)
IMM GRANULOCYTES NFR BLD AUTO: 0.9 % (ref 0–0.5)
LYMPHOCYTES # BLD AUTO: 1.34 10*3/MM3 (ref 0.7–3.1)
LYMPHOCYTES NFR BLD AUTO: 13.7 % (ref 19.6–45.3)
MAGNESIUM SERPL-MCNC: 1.9 MG/DL (ref 1.6–2.6)
MCH RBC QN AUTO: 26.5 PG (ref 26.6–33)
MCHC RBC AUTO-ENTMCNC: 31.7 G/DL (ref 31.5–35.7)
MCV RBC AUTO: 83.6 FL (ref 79–97)
MONOCYTES # BLD AUTO: 0.88 10*3/MM3 (ref 0.1–0.9)
MONOCYTES NFR BLD AUTO: 9 % (ref 5–12)
NEUTROPHILS NFR BLD AUTO: 7.25 10*3/MM3 (ref 1.7–7)
NEUTROPHILS NFR BLD AUTO: 74.5 % (ref 42.7–76)
NRBC BLD AUTO-RTO: 0 /100 WBC (ref 0–0.2)
PHOSPHATE SERPL-MCNC: 2.8 MG/DL (ref 2.5–4.5)
PLATELET # BLD AUTO: 250 10*3/MM3 (ref 140–450)
PMV BLD AUTO: 9.2 FL (ref 6–12)
POTASSIUM SERPL-SCNC: 4.8 MMOL/L (ref 3.5–5.2)
RBC # BLD AUTO: 3.66 10*6/MM3 (ref 3.77–5.28)
SODIUM SERPL-SCNC: 130 MMOL/L (ref 136–145)
WBC NRBC COR # BLD: 9.75 10*3/MM3 (ref 3.4–10.8)

## 2023-03-28 PROCEDURE — 83735 ASSAY OF MAGNESIUM: CPT | Performed by: INTERNAL MEDICINE

## 2023-03-28 PROCEDURE — 85025 COMPLETE CBC W/AUTO DIFF WBC: CPT | Performed by: HOSPITALIST

## 2023-03-28 PROCEDURE — 82962 GLUCOSE BLOOD TEST: CPT

## 2023-03-28 PROCEDURE — 80069 RENAL FUNCTION PANEL: CPT | Performed by: INTERNAL MEDICINE

## 2023-03-28 PROCEDURE — 25010000002 HEPARIN (PORCINE) PER 1000 UNITS: Performed by: INTERNAL MEDICINE

## 2023-03-28 PROCEDURE — 63710000001 INSULIN LISPRO (HUMAN) PER 5 UNITS: Performed by: HOSPITALIST

## 2023-03-28 RX ORDER — LEVOTHYROXINE SODIUM 0.15 MG/1
300 TABLET ORAL DAILY
Qty: 60 TABLET | Refills: 0 | Status: SHIPPED | OUTPATIENT
Start: 2023-03-28

## 2023-03-28 RX ADMIN — LIDOCAINE 1 PATCH: 50 PATCH CUTANEOUS at 08:43

## 2023-03-28 RX ADMIN — ASPIRIN 81 MG: 81 TABLET, COATED ORAL at 14:09

## 2023-03-28 RX ADMIN — LIDOCAINE 1 PATCH: 50 PATCH CUTANEOUS at 08:44

## 2023-03-28 RX ADMIN — HYDROCODONE BITARTRATE AND ACETAMINOPHEN 1 TABLET: 5; 325 TABLET ORAL at 10:41

## 2023-03-28 RX ADMIN — AMLODIPINE BESYLATE 10 MG: 10 TABLET ORAL at 14:08

## 2023-03-28 RX ADMIN — AMOXICILLIN 500 MG: 250 CAPSULE ORAL at 15:39

## 2023-03-28 RX ADMIN — CARVEDILOL 6.25 MG: 6.25 TABLET, FILM COATED ORAL at 17:16

## 2023-03-28 RX ADMIN — HEPARIN SODIUM 4000 UNITS: 1000 INJECTION INTRAVENOUS; SUBCUTANEOUS at 10:44

## 2023-03-28 RX ADMIN — LEVOTHYROXINE SODIUM 300 MCG: 0.15 TABLET ORAL at 14:10

## 2023-03-28 RX ADMIN — PANTOPRAZOLE SODIUM 40 MG: 40 TABLET, DELAYED RELEASE ORAL at 14:09

## 2023-03-28 RX ADMIN — LEVETIRACETAM 250 MG: 250 TABLET, FILM COATED ORAL at 14:08

## 2023-03-28 RX ADMIN — Medication 1 MG: at 14:10

## 2023-03-28 RX ADMIN — INSULIN LISPRO 3 UNITS: 100 INJECTION, SOLUTION INTRAVENOUS; SUBCUTANEOUS at 17:16

## 2023-03-28 RX ADMIN — DESVENLAFAXINE SUCCINATE 25 MG: 25 TABLET, EXTENDED RELEASE ORAL at 14:09

## 2023-03-28 NOTE — PROGRESS NOTES
"  Infectious Diseases Progress Note    Debbie Quick MD     [facility]  [place]: 8 days  Patient Identification:  Name: Zaina Martinez  Age: 57 y.o.  Sex: female  :  1965  MRN: [id number]         Primary Care Physician: Norman Serrato MD        Subjective: Continues to improve feeling better breathing better.  Interval History: See consultation note.    Objective:    Scheduled Meds:amLODIPine, 10 mg, Oral, Q24H  amoxicillin, 500 mg, Oral, Q24H  aspirin, 81 mg, Oral, Daily  carvedilol, 6.25 mg, Oral, BID With Meals  Desvenlafaxine Succinate ER, 25 mg, Oral, Daily  folic acid, 1 mg, Oral, Daily  insulin lispro, 0-7 Units, Subcutaneous, 4x Daily With Meals & Nightly  levETIRAcetam, 250 mg, Oral, BID  levothyroxine, 300 mcg, Oral, Daily  lidocaine, 1 patch, Transdermal, Q24H  lidocaine, 1 patch, Transdermal, Q24H  pantoprazole, 40 mg, Oral, Daily  rOPINIRole, 2 mg, Oral, Nightly      Continuous Infusions:     Vital signs in last 24 hours:  Temp:  [97.6 °F (36.4 °C)-98.4 °F (36.9 °C)] 97.6 °F (36.4 °C)  Heart Rate:  [71-81] 81  Resp:  [18] 18  BP: (149-176)/(83-97) 176/96    Intake/Output:    Intake/Output Summary (Last 24 hours) at 3/28/2023 0832  Last data filed at 3/28/2023 0620  Gross per 24 hour   Intake 300 ml   Output --   Net 300 ml       Exam:  /96 (BP Location: Left arm, Patient Position: Lying)   Pulse 81   Temp 97.6 °F (36.4 °C) (Oral)   Resp 18   Ht 157.5 cm (62\")   Wt 65 kg (143 lb 4.8 oz)   SpO2 99%   BMI 26.21 kg/m²   Patient is examined using the personal protective equipment as per guidelines from infection control for this particular patient as enacted.  Hand washing was performed before and after patient interaction.  General Appearance:  Chronically ill-appearing female                          Head:    Normocephalic, without obvious abnormality, atraumatic                           Eyes:    PERRL, conjunctivae/corneas clear, EOM's intact, both eyes                      "    Throat:   Lips, tongue, gums normal; oral mucosa pink and moist                           Neck:   Supple, symmetrical, trachea midline, no JVD                         Lungs:    No obvious use of accessory muscles of breathing noted decreased breath sounds at the bases.                 Chest Wall:    No tenderness or deformity                          Heart:  S1-S2 irregular                  Abdomen:   Soft nontender                 Extremities:   Extremities normal, atraumatic, no cyanosis or edema                        Pulses:   Pulses palpable in all extremities                            Skin: Bruising and ecchymotic changes noted                  Neurologic: Withdrawn and weak and does not engage.       Data Review:    I reviewed the patient's new clinical results.  Results from last 7 days   Lab Units 03/28/23  0456 03/27/23  0501 03/26/23  0515 03/25/23  0316 03/24/23  0634 03/23/23  0518 03/22/23  0403   WBC 10*3/mm3 9.75 9.98 11.08* 11.76* 11.40* 12.45* 11.68*   HEMOGLOBIN g/dL 9.7* 9.9* 9.8* 10.0* 9.5* 9.8* 9.2*   PLATELETS 10*3/mm3 250 245 243 353 337 375 357     Results from last 7 days   Lab Units 03/28/23  0456 03/27/23  0501 03/26/23  0325 03/25/23  0316 03/24/23  0634 03/23/23  0518 03/22/23  0403   SODIUM mmol/L 130* 126* 129* 131* 129* 132* 133*   POTASSIUM mmol/L 4.8 4.7 4.6 4.4 3.4* 3.6 3.4*   CHLORIDE mmol/L 100 96* 99 97* 97* 96* 98   CO2 mmol/L 22.0 20.1* 20.0* 24.4 20.0* 24.0 26.0   BUN mg/dL 25* 20 15 22* 16 24* 19   CREATININE mg/dL 2.57* 2.23* 1.63* 2.14* 1.60* 2.36* 1.92*   CALCIUM mg/dL 7.9* 8.5* 7.9* 8.8 8.2* 8.0* 7.7*   GLUCOSE mg/dL 95 86 197* 97 125* 124* 129*     Microbiology Results (last 10 days)     Procedure Component Value - Date/Time    Urine Culture - Urine, Straight Cath [[id number]]  (Abnormal)  (Susceptibility) Collected: 03/21/23 1458    Lab Status: Final result Specimen: Urine from Straight Cath Updated: 03/23/23 1022     Urine Culture <25,000 CFU/mL Enterococcus  faecalis    Narrative:      Colonization of the urinary tract without infection is common. Treatment is discouraged unless the patient is symptomatic, pregnant, or undergoing an invasive urologic procedure.    Susceptibility      Enterococcus faecalis      ARSENIO      Ampicillin Susceptible      Levofloxacin Resistant     Nitrofurantoin Susceptible      Tetracycline Resistant     Vancomycin Susceptible                           Blood Culture - Blood, Arm, Right [[id number]]  (Normal) Collected: 03/20/23 1143    Lab Status: Final result Specimen: Blood from Arm, Right Updated: 03/25/23 1200     Blood Culture No growth at 5 days    Blood Culture - Blood, Arm, Left [[id number]]  (Normal) Collected: 03/20/23 1143    Lab Status: Final result Specimen: Blood from Arm, Left Updated: 03/25/23 1200     Blood Culture No growth at 5 days    Respiratory Panel PCR w/COVID-19(SARS-CoV-2) [facility] In-House, NP Swab in UTM/VTM, 3-4 HR TAT - Swab, Nasopharynx [[id number]]  (Normal) Collected: 03/20/23 1122    Lab Status: Final result Specimen: Swab from Nasopharynx Updated: 03/20/23 1359     ADENOVIRUS, PCR Not Detected     Coronavirus 229E Not Detected     Coronavirus HKU1 Not Detected     Coronavirus NL63 Not Detected     Coronavirus OC43 Not Detected     COVID19 Not Detected     Human Metapneumovirus Not Detected     Human Rhinovirus/Enterovirus Not Detected     Influenza A PCR Not Detected     Influenza B PCR Not Detected     Parainfluenza Virus 1 Not Detected     Parainfluenza Virus 2 Not Detected     Parainfluenza Virus 3 Not Detected     Parainfluenza Virus 4 Not Detected     RSV, PCR Not Detected     Bordetella pertussis pcr Not Detected     Bordetella parapertussis PCR Not Detected     Chlamydophila pneumoniae PCR Not Detected     Mycoplasma pneumo by PCR Not Detected    Narrative:      In the setting of a positive respiratory panel with a viral infection PLUS a negative procalcitonin without other underlying  concern for bacterial infection, consider observing off antibiotics or discontinuation of antibiotics and continue supportive care. If the respiratory panel is positive for atypical bacterial infection (Bordetella pertussis, Chlamydophila pneumoniae, or Mycoplasma pneumoniae), consider antibiotic de-escalation to target atypical bacterial infection.    Urine Culture - Urine, Urine, Catheter [[id number]]  (Normal) Collected: 03/20/23 1122    Lab Status: Final result Specimen: Urine, Catheter Updated: 03/21/23 1237     Urine Culture No growth            Assessment:    Urinary tract infection without hematuria, site unspecified  1-systemic sepsis due to:              -pneumonia bilateral with moderate left-sided pleural effusion and mild right-sided pleural effusion with risk of complications of pneumonia such as evolving empyema              -urinary tract infection in the setting of end-stage renal disease and minimal urinary output              -possible line sepsis due to infected hemodialysis catheter  2-end-stage renal disease on hemodialysis  3-reactive diabetes  4-anemia  5-rheumatoid arthritis  6-other diagnoses per primary team.     Recommendations/Discussions:  Continue present care plan as per Dr. Dunn on 3/26/2023.  Debbie Quick MD  3/28/2023  08:32 EDT    Parts of this note may be an electronic transcription/translation of spoken language to printed text using the Dragon dictation system.

## 2023-03-28 NOTE — CASE MANAGEMENT/SOCIAL WORK
Continued Stay Note  [facility]     Patient Name: Zaina Martinez  MRN: [id number]  Today's Date: 3/28/2023    Admit Date: 3/20/2023    Plan: Home with family and [facility].  [facility] to follow.   Discharge Plan     Row Name 03/28/23 1808       Plan    Plan Home with family and [facility].  [facility] to follow.    Plan Comments Multiple conversations with patient, , and Marv Camp .   requested that all of her current meds be sent to our pharmacy to be filled. Dr. Pena would only e-scribe meds that were new discharge medication, he states the primary care physician is responsible for her routine medications. Unfortunately patient did not have a PCP. With patient and  approval coordinated with [facility] to see patient, gave  number [phone].  will bring in the list of medications from home, and Nurse Marv knows that he will need to match available medications to discharge medication list.  states that if does not have the medications, he will not take the patient home tonight.  Spoke with unit Manage Tesha and she was going to send message to Dr. Ibarra, so that if patient does not leave tonight, due to missing medications they could take care of in the AM.  to transport home.  still waiting for call from [facility] but he did hear from [facility]. Will continue to monitor for new or changing discharge needs. Hazel BARNES RN CCP               Discharge Codes    No documentation.               Expected Discharge Date and Time     Expected Discharge Date Expected Discharge Time    Mar 28, 2023             Hazel Guadalupe RN

## 2023-03-28 NOTE — SIGNIFICANT NOTE
03/28/23 1336   OTHER   Discipline physical therapy assistant   Rehab Time/Intention   Session Not Performed patient unavailable for treatment  (Pt still ROEL to dialysis this PM. PT will follow up today as time allows.)

## 2023-03-28 NOTE — DISCHARGE SUMMARY
Discharge summary    Date of admission 3/20/2023  Date of discharge 3/28/2023    Final diagnosis  Left basilar pneumonia resolved  Acute Enterococcus UTI completed antibiotics  Metabolic encephalopathy  End-stage renal disease on hemodialysis  Right MCA CVA  Diastolic congestive heart failure  Insulin-dependent diabetes mellitus  Hypertension   Hyperlipidemia  Hypothyroidism  Seizure disorder  Chronic anemia  Gastroesophageal reflux disease    Discharge medications    Current Facility-Administered Medications:   •  amLODIPine (NORVASC) tablet 10 mg, 10 mg, Oral, Q24H, Dimitri Pena MD, 10 mg at 03/28/23 1408  •  amoxicillin (AMOXIL) capsule 500 mg, 500 mg, Oral, Q24H, Alexei Dunn MD, 500 mg at 03/27/23 1554  •  aspirin EC tablet 81 mg, 81 mg, Oral, Daily, Dimitri Pena MD, 81 mg at 03/28/23 1409  •  carvedilol (COREG) tablet 6.25 mg, 6.25 mg, Oral, BID With Meals, Moises Lucero MD, 6.25 mg at 03/27/23 1717  •  Desvenlafaxine Succinate ER 25 mg, 25 mg, Oral, Daily, Dimitri Pena MD, 25 mg at 03/28/23 1409  •  folic acid (FOLVITE) tablet 1 mg, 1 mg, Oral, Daily, Dimitri Pena MD, 1 mg at 03/28/23 1410  •  insulin lispro (ADMELOG) injection 0-7 Units, 0-7 Units, Subcutaneous, 4x Daily With Meals & Nightly, Dimitri Pena MD, 2 Units at 03/26/23 0832  •  levETIRAcetam (KEPPRA) tablet 250 mg, 250 mg, Oral, BID, Dimitri Pena MD, 250 mg at 03/28/23 1408  •  levothyroxine (SYNTHROID, LEVOTHROID) tablet 300 mcg, 300 mcg, Oral, Daily, Dimitri Pena MD, 300 mcg at 03/28/23 1410  •  lidocaine (LIDODERM) 5 % 1 patch, 1 patch, Transdermal, Q24H, Ruth Lira MD, 1 patch at 03/28/23 0844  •  lidocaine (LIDODERM) 5 % 1 patch, 1 patch, Transdermal, Q24H, Dimitri Pena MD, 1 patch at 03/28/23 0843  •  pantoprazole (PROTONIX) EC tablet 40 mg, 40 mg, Oral, Daily, Dimitri Pena MD, 40 mg at 03/28/23 1409  •  rOPINIRole (REQUIP) tablet 2 mg, 2 mg, Oral, Nightly, Dimitri Pena MD, 2 mg at 03/27/23 2042  •  [COMPLETED]  Insert Peripheral IV, , , Once **AND** sodium chloride 0.9 % flush 10 mL, 10 mL, Intravenous, PRN, Andrea Ocampo MD, 10 mL at 03/26/23 0832     Consults obtained  Nephrology  Infectious disease  Palliative care    Procedures  None    Hospital course  57-year-old white female with very complex past medical history including end-stage renal disease right MCA stroke diabetes hypertension hypothyroidism chronic anemia admitted to emergency room with fever cough congestion and generalized weakness.  Patient also have altered mental status and work-up in ER revealed UTI and pneumonia with metabolic encephalopathy admit for management.  Patient admitted treated with empiric antibiotics and also have worsening anemia required blood transfusion and remain on hemodialysis.  Patient cultures came back positive for Enterococcus and antibiotics adjusted by infectious disease.  Patient has poor prognosis and further discussion with palliative care by patient and family about goal of care went well and they decided to continue with current care including hemodialysis and wants to go home.  Patient completed antibiotics and has had hemodialysis this morning and cleared for discharge.  Patient hemoglobin at the time of discharge is 9.7.  Patient remained DNR throughout hospital course.    Discharge diet regular    Activity as tolerated    Medication as above    Patient will follow-up with primary doctor in 1 week and follow-up with nephrology per the instruction and keep the appointment for hemodialysis 3 times a week and take medication as directed    TERRELL DELVALLE MD

## 2023-03-28 NOTE — PROGRESS NOTES
[facility] Progress Note      Patient Name: Zaina Martinez  : 1965  MRN: [id number]  Primary Care Physician:  Norman Serrato MD  Date of admission: 3/20/2023    Subjective     Interval History:   Follow up ESRD.  On dialysis.   Says she ate some breakfast. Discussion with  Dr. Lange noted.  Declines feeding tube .  Complains of rib pain unrelieved with current dose of norco.   Review of Systems:   As noted above    Objective     Vitals:   Temp:  [97.6 °F (36.4 °C)-98.4 °F (36.9 °C)] 97.6 °F (36.4 °C)  Heart Rate:  [71-81] 81  Resp:  [18] 18  BP: (149-176)/(83-97) 176/96    Intake/Output Summary (Last 24 hours) at 3/28/2023 1224  Last data filed at 3/28/2023 0853  Gross per 24 hour   Intake 540 ml   Output --   Net 540 ml       Physical Exam:    General Appearance: Very Chronically ill and frail.  On dialysis .  Skin: warm and dry. pale  HEENT:oral mucosa moist.   Neck:RIJ TDC. Tunnel non-tender.   Lungs: decreased bs left lower lobe.   Heart: RRR, no s3 or rub  Abdomen: soft, nontender, nondistended, very active bs  Extremities: no edema.  Neuro: flat affect. . Moves all 4 ext.    Scheduled Meds:     amLODIPine, 10 mg, Oral, Q24H  amoxicillin, 500 mg, Oral, Q24H  aspirin, 81 mg, Oral, Daily  carvedilol, 6.25 mg, Oral, BID With Meals  Desvenlafaxine Succinate ER, 25 mg, Oral, Daily  folic acid, 1 mg, Oral, Daily  insulin lispro, 0-7 Units, Subcutaneous, 4x Daily With Meals & Nightly  levETIRAcetam, 250 mg, Oral, BID  levothyroxine, 300 mcg, Oral, Daily  lidocaine, 1 patch, Transdermal, Q24H  lidocaine, 1 patch, Transdermal, Q24H  pantoprazole, 40 mg, Oral, Daily  rOPINIRole, 2 mg, Oral, Nightly      IV Meds:        Results Reviewed:   I have personally reviewed the results from the time of this admission to 3/28/2023 12:24 EDT     Results from last 7 days   Lab Units 23  0456 23  0501 23  0325   SODIUM mmol/L 130* 126* 129*   POTASSIUM mmol/L 4.8 4.7 4.6    CHLORIDE mmol/L 100 96* 99   CO2 mmol/L 22.0 20.1* 20.0*   BUN mg/dL 25* 20 15   CREATININE mg/dL 2.57* 2.23* 1.63*   CALCIUM mg/dL 7.9* 8.5* 7.9*   GLUCOSE mg/dL 95 86 197*       Estimated Creatinine Clearance: 21.4 mL/min (A) (by C-G formula based on SCr of 2.57 mg/dL (H)).    Results from last 7 days   Lab Units 03/28/23  0456 03/25/23 0316 03/23/23  0518   MAGNESIUM mg/dL 1.9 1.9  --    PHOSPHORUS mg/dL 2.8 2.2* 2.9             Results from last 7 days   Lab Units 03/28/23  0456 03/27/23  0501 03/26/23  0515 03/25/23  0316 03/24/23  0634   WBC 10*3/mm3 9.75 9.98 11.08* 11.76* 11.40*   HEMOGLOBIN g/dL 9.7* 9.9* 9.8* 10.0* 9.5*   PLATELETS 10*3/mm3 250 245 243 353 337             Assessment / Plan     ASSESSMENT:  1. ESRD. Dialysis today.   If dc should go to her regular spot tomorrow .  2. Sepsis with multiple sources, PNA , moderate left and small right pleural effusions, TDC, UTI . Enterococcus uti.  On amoxicillin.   3. Chronic diastolic  heart failure.  4. HTN controlled.   5. Anemia ckd and blood loss. 1 unit  PRBC 3/20.  HG stable .  6. Left renal hematoma after left ureteral stent 12/30/22.  Left renal artery embolization 1/1/23.  Left stent removal 1/10/23.   7. Rib fractures. Right 6, left 8th.  8. Hypothyroid on replacement.   9. Labile DM2    PLAN:  1. Ok for dc after dialysis today.  2. Should go to her regular dialysis spot tomorrow .        Ruth Lira MD  03/28/23  12:24 EDT    [facility]  [phone]

## 2023-03-28 NOTE — SIGNIFICANT NOTE
03/28/23 1454   OTHER   Discipline physical therapy assistant   Rehab Time/Intention   Session Not Performed patient/family declined treatment  (Pt had just returned from dialysis this PM and was eating lunch. Pt declind PT and asked that PT check back tomorrow.)   Recommendation   PT - Next Appointment 03/29/23

## 2023-03-28 NOTE — PROGRESS NOTES
"Daily progress note    Primary care physician      Chief complaint  Doing same with no new complaint and wants to go home    History of present illness  57-year-old white female with very complex past medical history including end-stage renal disease on hemodialysis insulin-dependent diabetes mellitus congestive heart failure hypothyroidism hypertension right MCA stroke and chronic anemia presented to [facility] with altered mental status fever cough and generalized weakness.  Patient evaluated in ER found to have left basilar pneumonia and acute UTI with metabolic encephalopathy admit for management.  Patient able to answer all questions but most of the history obtained from the  old record nursing staff.  Patient has no chest pain increase shortness of breath palpitation nausea vomiting diarrhea.     REVIEW OF SYSTEMS  Unremarkable except pain and weakness    PHYSICAL EXAM  Blood pressure 177/97, pulse 83, temperature 98.5 °F (36.9 °C), temperature source Oral, resp. rate 18, height 157.5 cm (62\"), weight 65 kg (143 lb 4.8 oz), SpO2 97 %, not currently breastfeeding.    GENERAL:  Awake and alert ill-appearing lady, in no respiratory distress  HEENT:  Unremarkable  NECK:  Supple  CV: regular rhythm, normal rate, normal pulses  RESPIRATORY: normal effort, breath sounds diminished at the bases, few scattered rhonchi noted, a couple of nonproductive cough noted during exam.  ABDOMEN: soft nontender in all quadrants  MUSCULOSKELETAL: no deformity, no asymmetry  NEURO: alert, moves all extremities, follows commands, diffuse mild weakness to all extremities but no obvious focal motor deficit pattern evident.  PSYCH:  calm, cooperative  SKIN: warm, dry     LAB RESULTS  Lab Results (last 24 hours)     Procedure Component Value Units Date/Time    POC Glucose Once [[id number]]  (Normal) Collected: 03/28/23 0607    Specimen: Blood Updated: 03/28/23 0609     Glucose 92 mg/dL      Comment: Meter: [id number] : [id number] Vandana SAAVEDRA       Renal Function Panel [[id number]]  (Abnormal) Collected: 03/28/23 0456    Specimen: Blood Updated: 03/28/23 0530     Glucose 95 mg/dL      BUN 25 mg/dL      Creatinine 2.57 mg/dL      Sodium 130 mmol/L      Potassium 4.8 mmol/L      Chloride 100 mmol/L      CO2 22.0 mmol/L      Calcium 7.9 mg/dL      Albumin 2.3 g/dL      Phosphorus 2.8 mg/dL      Anion Gap 8.0 mmol/L      BUN/Creatinine Ratio 9.7     eGFR 21.2 mL/min/1.73     Narrative:      GFR Normal >60  Chronic Kidney Disease <60  Kidney Failure <15      Magnesium [[id number]]  (Normal) Collected: 03/28/23 0456    Specimen: Blood Updated: 03/28/23 0528     Magnesium 1.9 mg/dL     CBC & Differential [[id number]]  (Abnormal) Collected: 03/28/23 0456    Specimen: Blood Updated: 03/28/23 0516    Narrative:      The following orders were created for panel order CBC & Differential.  Procedure                               Abnormality         Status                     ---------                               -----------         ------                     CBC Auto Differential[[id number]]        Abnormal            Final result                 Please view results for these tests on the individual orders.    CBC Auto Differential [[id number]]  (Abnormal) Collected: 03/28/23 0456    Specimen: Blood Updated: 03/28/23 0516     WBC 9.75 10*3/mm3      RBC 3.66 10*6/mm3      Hemoglobin 9.7 g/dL      Hematocrit 30.6 %      MCV 83.6 fL      MCH 26.5 pg      MCHC 31.7 g/dL      RDW 17.0 %      RDW-SD 51.5 fl      MPV 9.2 fL      Platelets 250 10*3/mm3      Neutrophil % 74.5 %      Lymphocyte % 13.7 %      Monocyte % 9.0 %      Eosinophil % 1.3 %      Basophil % 0.6 %      Immature Grans % 0.9 %      Neutrophils, Absolute 7.25 10*3/mm3      Lymphocytes, Absolute 1.34 10*3/mm3      Monocytes, Absolute 0.88 10*3/mm3      Eosinophils, Absolute 0.13 10*3/mm3      Basophils, Absolute 0.06 10*3/mm3      Immature Grans, Absolute 0.09 10*3/mm3      nRBC  0.0 /100 WBC     POC Glucose Once [[id number]]  (Normal) Collected: 03/27/23 2040    Specimen: Blood Updated: 03/27/23 2042     Glucose 100 mg/dL      Comment: Meter: [id number] : [id number] Vandana SAAVEDRA       POC Glucose Once [[id number]]  (Normal) Collected: 03/27/23 1710    Specimen: Blood Updated: 03/27/23 1712     Glucose 100 mg/dL      Comment: Meter: [id number] : [id number] Zully SAAVEDRA           Imaging Results (Last 24 Hours)     ** No results found for the last 24 hours. **        ECG 12 Lead             Component  Ref Range & Units 11:12  (3/20/23) 1 mo ago  (2/5/23) 1 mo ago  (1/22/23) 1 mo ago  (1/22/23) 2 mo ago  (1/1/23) 2 mo ago  (12/26/22) 3 mo ago  (12/4/22)   QT Interval  ms 415 P  418  391  372  397  514  490    Resulting Agency  ECG  ECG  ECG  ECG  ECG  ECG  ECG             HEART RATE= 71  bpm  RR Interval= 845  ms  WV Interval= 160  ms  P Horizontal Axis= 61  deg  P Front Axis= 49  deg  QRSD Interval= 101  ms  QT Interval= 415  ms  QRS Axis= -33  deg  T Wave Axis= 43  deg  - OTHERWISE NORMAL ECG -  Sinus rhythm  Left axis deviation  Baseline wander in lead(s) I,II,III,aVR,aVF,V1,V4,V5             Current Facility-Administered Medications:   •  acetaminophen (TYLENOL) tablet 650 mg, 650 mg, Oral, Q6H PRN, Dimitri Pena MD, 650 mg at 03/20/23 1757  •  amLODIPine (NORVASC) tablet 10 mg, 10 mg, Oral, Q24H, Dimitri Pena MD, 10 mg at 03/28/23 1408  •  amoxicillin (AMOXIL) capsule 500 mg, 500 mg, Oral, Q24H, Alexei Dunn MD, 500 mg at 03/27/23 1554  •  aspirin EC tablet 81 mg, 81 mg, Oral, Daily, Dimitri Pena MD, 81 mg at 03/28/23 1409  •  carvedilol (COREG) tablet 6.25 mg, 6.25 mg, Oral, BID With Meals, Moises Lucero MD, 6.25 mg at 03/27/23 1717  •  Desvenlafaxine Succinate ER 25 mg, 25 mg, Oral, Daily, Dimitri Pena MD, 25 mg at 03/28/23 1409  •  folic acid (FOLVITE) tablet 1 mg, 1 mg, Oral, Daily, Dimitri Pena MD, 1 mg at 03/28/23 1410  •  heparin (porcine)  injection 4,000 Units, 4,000 Units, Intracatheter, PRN, Alejo Lange MD, 4,000 Units at 03/28/23 1044  •  HYDROcodone-acetaminophen (NORCO) 5-325 MG per tablet 1 tablet, 1 tablet, Oral, Q4H PRN, Terrell Pena MD, 1 tablet at 03/28/23 1041  •  insulin lispro (ADMELOG) injection 0-7 Units, 0-7 Units, Subcutaneous, 4x Daily With Meals & Nightly, Terrell Pena MD, 2 Units at 03/26/23 0832  •  levETIRAcetam (KEPPRA) tablet 250 mg, 250 mg, Oral, BID, Terrell Pena MD, 250 mg at 03/28/23 1408  •  levothyroxine (SYNTHROID, LEVOTHROID) tablet 300 mcg, 300 mcg, Oral, Daily, Terrell Pena MD, 300 mcg at 03/28/23 1410  •  lidocaine (LIDODERM) 5 % 1 patch, 1 patch, Transdermal, Q24H, Ruth Lira MD, 1 patch at 03/28/23 0844  •  lidocaine (LIDODERM) 5 % 1 patch, 1 patch, Transdermal, Q24H, Terrell Pena MD, 1 patch at 03/28/23 0843  •  melatonin tablet 3 mg, 3 mg, Oral, Nightly PRN, Terrell Pena MD  •  ondansetron (ZOFRAN) injection 4 mg, 4 mg, Intravenous, Q4H PRN, Terrell Pena MD, 4 mg at 03/26/23 1130  •  pantoprazole (PROTONIX) EC tablet 40 mg, 40 mg, Oral, Daily, Terrell Pena MD, 40 mg at 03/28/23 1409  •  rOPINIRole (REQUIP) tablet 2 mg, 2 mg, Oral, Nightly, Terrell Pena MD, 2 mg at 03/27/23 2042  •  sennosides-docusate (PERICOLACE) 8.6-50 MG per tablet 2 tablet, 2 tablet, Oral, Nightly PRN, Terrell Pena MD  •  [COMPLETED] Insert Peripheral IV, , , Once **AND** sodium chloride 0.9 % flush 10 mL, 10 mL, Intravenous, PRN, Andrea Ocampo MD, 10 mL at 03/26/23 0832    ASSESSMENT  Left basilar pneumonia resolved  Acute Enterococcus UTI completed antibiotics  Metabolic encephalopathy  End-stage renal disease on hemodialysis  Right MCA CVA  Diastolic congestive heart failure  Insulin-dependent diabetes mellitus  Hypertension   Hyperlipidemia  Hypothyroidism  Seizure disorder  Chronic anemia  Gastroesophageal reflux disease    PLAN  Discharge home with family support  Discharge summary dictated    TERRELL  MOOK BRAMBILA    Copied text in this note has been reviewed and is accurate as of 03/28/23

## 2023-03-28 NOTE — PROGRESS NOTES
[facility] Progress Note      Patient Name: Zaina Martinez  : 1965  MRN: [id number]  Primary Care Physician:  Norman Serrato MD  Date of admission: 3/20/2023    Subjective     Interval History:   Follow up ESRD  Appetite very poor, remains too weak to get out of bed  Smell of food makes her nauseous  No shortness of breath on room air      Review of Systems:   As noted above    Objective     Vitals:   Temp:  [98.2 °F (36.8 °C)-98.5 °F (36.9 °C)] 98.2 °F (36.8 °C)  Heart Rate:  [72-74] 72  Resp:  [18-20] 18  BP: (149-171)/(79-97) 152/97  Flow (L/min):  [1.5] 1.5  No intake or output data in the 24 hours ending 23    Physical Exam:    General Appearance: very chronically ill and frail; gaunt  Skin: warm and dry, pale  HEENT: oral mucosa moist.   Neck: RIJ TDC. Tunnel non-tender. No JVD  Lungs: upper airway sounds, dull in bases  Heart: RRR, no s3 or rub  Abdomen: soft, nontender, nondistended, BS +  Extremities: Trace edema  Psychiatric: Flat affect and depressed mood  Neuro: Moves all extremities    Scheduled Meds:     amLODIPine, 10 mg, Oral, Q24H  amoxicillin, 500 mg, Oral, Q24H  aspirin, 81 mg, Oral, Daily  carvedilol, 6.25 mg, Oral, BID With Meals  Desvenlafaxine Succinate ER, 25 mg, Oral, Daily  folic acid, 1 mg, Oral, Daily  insulin lispro, 0-7 Units, Subcutaneous, 4x Daily With Meals & Nightly  levETIRAcetam, 250 mg, Oral, BID  levothyroxine, 300 mcg, Oral, Daily  lidocaine, 1 patch, Transdermal, Q24H  lidocaine, 1 patch, Transdermal, Q24H  pantoprazole, 40 mg, Oral, Daily  rOPINIRole, 2 mg, Oral, Nightly      IV Meds:        Results Reviewed:   I have personally reviewed the results from the time of this admission to 3/27/2023 20:23 EDT     Results from last 7 days   Lab Units 23  0501 23  0325 23  0316 23  0403 23  0327   SODIUM mmol/L 126* 129* 131*   < > 134*   POTASSIUM mmol/L 4.7 4.6 4.4   < > 4.3   CHLORIDE mmol/L 96* 99 97*    < > 93*   CO2 mmol/L 20.1* 20.0* 24.4   < > 26.6   BUN mg/dL 20 15 22*   < > 39*   CREATININE mg/dL 2.23* 1.63* 2.14*   < > 3.31*   CALCIUM mg/dL 8.5* 7.9* 8.8   < > 8.3*   BILIRUBIN mg/dL  --   --   --   --  0.4   ALK PHOS U/L  --   --   --   --  133*   ALT (SGPT) U/L  --   --   --   --  7   AST (SGOT) U/L  --   --   --   --  19   GLUCOSE mg/dL 86 197* 97   < > 88    < > = values in this interval not displayed.       Estimated Creatinine Clearance: 24.4 mL/min (A) (by C-G formula based on SCr of 2.23 mg/dL (H)).    Results from last 7 days   Lab Units 03/25/23  0316 03/23/23  0518 03/21/23  0327   MAGNESIUM mg/dL 1.9  --  1.8   PHOSPHORUS mg/dL 2.2* 2.9 4.0             Results from last 7 days   Lab Units 03/27/23  0501 03/26/23  0515 03/25/23  0316 03/24/23  0634 03/23/23  0518   WBC 10*3/mm3 9.98 11.08* 11.76* 11.40* 12.45*   HEMOGLOBIN g/dL 9.9* 9.8* 10.0* 9.5* 9.8*   PLATELETS 10*3/mm3 245 243 353 337 375             Assessment / Plan     ASSESSMENT:  1. ESRD, improving volume excess; potassium normal.  Hyponatremia due to water excess in the setting of ESRD and from poor solute intake  2. Sepsis with multiple sources, PNA, moderate left and small right pleural effusions, TDC, ?UTI (culture with insignificant growth)  3. Chronic diastolic heart failure, compensated  4. HTN of ESRD  5. Anemia ckd and blood loss. 1 unit  PRBC 3/20.   6. Left renal hematoma after left ureteral stent 12/30/22.  Left renal artery embolization 1/1/23.  Left stent removal 1/10/23.   7. Rib fractures:  right 6, left 8th.  8. Hypothyroid on replacement.   9. Labile DM2    PLAN:  1.  HD tomorrow (TTS)  2.  Discussed temporary feeding tube with her and ; we talked about her current lack of sufficient calories.  She is adamantly opposed to feeding tube  3.  Palliative team reviewing goals of care with patient and family      Alejo Lange MD  03/27/23  20:23 EDT    [facility]  [phone]

## 2023-03-28 NOTE — NURSING NOTE
RNJ went over home meds brought in by .    The only meds pt does not have at home, are the Mircera IJ every 14 days, and the antidepressant Desvenlafaxine ER 25 MG, 1 tablet  Every day.

## 2023-03-29 ENCOUNTER — TELEPHONE (OUTPATIENT)
Dept: SOCIAL WORK | Facility: HOSPITAL | Age: 58
End: 2023-03-29
Payer: MEDICARE

## 2023-03-29 RX ORDER — DESVENLAFAXINE 25 MG/1
25 TABLET, EXTENDED RELEASE ORAL DAILY
Qty: 30 TABLET | Refills: 0 | Status: SHIPPED | OUTPATIENT
Start: 2023-03-29 | End: 2023-04-28

## 2023-03-29 NOTE — OUTREACH NOTE
Prep Survey    Flowsheet Row Responses   [facility] facility patient discharged from? [facility]   Is LACE score < 7 ? No   Eligibility Readm Mgmt   Discharge diagnosis Urinary tract infection,   Left basilar pneumonia    Does the patient have one of the following disease processes/diagnoses(primary or secondary)? Pneumonia   Does the patient have Home health ordered? Yes   What is the Home health agency?  [facility]   Is there a DME ordered? No   Prep survey completed? Yes          Shelley REYNAGA - Registered Nurse

## 2023-03-29 NOTE — TELEPHONE ENCOUNTER
Call placed to Mr. Martinez to alert him that MD escribed the requested medication to pt's home pharmacy.  Mr. Martinez states he is appreciative and will pick it up.

## 2023-03-29 NOTE — PROGRESS NOTES
"Enter Query Response Below      Query Response:       Metabolic encephalopathy Electronically signed by Dimitri Pena MD, 23, 11:26 AM EDT.         If applicable, please update the problem list.       Patient: Zaina Martinez        : 1965  Account: [id number]           Admit Date: 3/20/2023        How to Respond to this query:       a. Click New Note     b. Answer query within the yellow box.                c. Update the Problem List, if applicable.      If you have any questions about this query contact me at: [email or web address]     Dr. Pena,    ED documentation includes that patient's  stated, \"This morning, she acted much more confused than normal  and would not respond to him appropriately.  He says that she had a \"blank stare\" on her face and her arms were drawn up towards her body but she was not shaking or tremoring.  When paramedics arrived, they found the patient to be hypoxic.  Her   says that ordinarily she wears oxygen at night but does not require it during the daytime.\"  ED documentation includes that patient was alert and oriented x 4.  Nursing documentation during this encounter includes that patient's [place] Coma Scale Score = 15 throughout this encounter.  Progress notes and discharge summary include diagnosis of metabolic encephalopathy.    After study, was metabolic encephalopathy clinically supported during this admission?    - Yes, please include additional clinical indicators:____________  - No  - Other- specify________  - Unable to determine    By submitting this query, we are merely seeking further clarification of documentation to accurately reflect all conditions that you are monitoring, evaluating, treating or that extend the hospitalization or utilize additional resources of care. Please utilize your independent clinical judgment when addressing the question(s) above.     This query and your response, once completed, will be entered into the legal " medical record.    Sincerely,  Maame Marc RN CCDS [facility]  Clinical Documentation Integrity Program

## 2023-03-29 NOTE — CASE MANAGEMENT/SOCIAL WORK
Continued Stay Note  [facility]     Patient Name: Zaina Martinez  MRN: [id number]  Today's Date: 3/29/2023    Admit Date: 3/20/2023    Plan: [facility]   Discharge Plan     Row Name 03/29/23 1524       Plan    Plan [facility]    Plan Comments CCP rec'd call from pts spouse. Mr. Martinez in a panic over pt not receiving her antidepressent on dc.  CCP spoke to Natalia TIDWELL and she took over this issue and was able to get Dr. Pena to call rx into [facility] for pt.  [facility] cannot [facility] until Monday.  Per S.I. she called pts spouse and gave him the update on where to  pts medication.  Issue resolved.  Thanks, Nicol LUCERO               Discharge Codes    No documentation.               Expected Discharge Date and Time     Expected Discharge Date Expected Discharge Time    Mar 28, 2023             Nicol Crawford

## 2023-03-31 ENCOUNTER — READMISSION MANAGEMENT (OUTPATIENT)
Dept: CALL CENTER | Facility: HOSPITAL | Age: 58
End: 2023-03-31
Payer: MEDICARE

## 2023-04-25 NOTE — PROGRESS NOTES
Name: Zaina Martinez ADMIT: 9/3/2022   : 1965  PCP: Jane Kyle NP    MRN: [id number] LOS: 2 days   AGE/SEX: 56 y.o. female  ROOM: [facility]   Subjective   Chief Complaint   Patient presents with   • Shortness of Breath      No events overnight. Pt's heart rate is still a little on the high side. Pt is mainly complaining of restless leg symptoms. Says that her requip doesn't really help as an outpatient either.     Objective   Vital Signs  Temp:  [98 °F (36.7 °C)-100.1 °F (37.8 °C)] 98.1 °F (36.7 °C)  Heart Rate:  [] 110  Resp:  [18-23] 18  BP: (117-158)/() 142/85  SpO2:  [91 %-98 %] 92 %  on  Flow (L/min):  [1-2] 2;   Device (Oxygen Therapy): room air  Body mass index is 22.88 kg/m².    Physical Exam  Vitals and nursing note reviewed.   Constitutional:       Appearance: She is ill-appearing (chronically). She is not diaphoretic.   Cardiovascular:      Rate and Rhythm: Normal rate. Rhythm irregular.   Pulmonary:      Effort: Pulmonary effort is normal.      Breath sounds: Decreased breath sounds present. No wheezing.   Abdominal:      General: There is no distension.      Palpations: Abdomen is soft.      Tenderness: There is no abdominal tenderness. There is no guarding or rebound.   Musculoskeletal:         General: No swelling or tenderness.      Cervical back: Neck supple. No tenderness.   Skin:     General: Skin is warm and dry.   Neurological:      Mental Status: She is alert.   Psychiatric:         Mood and Affect: Mood normal.         Behavior: Behavior normal.       I have reviewed the physical exam today and updated where needed as above.          Results Review:       I reviewed the patient's new clinical results.      Results from last 7 days   Lab Units 09/10/22  0523 22  0500 22  0508 22  0612   WBC 10*3/mm3 11.87* 9.62 9.66 8.43   HEMOGLOBIN g/dL 10.0* 10.2* 9.9* 9.2*   PLATELETS 10*3/mm3 155 127* 154 183     Results from last 7 days   Lab Units 09/10/22  1107  09/09/22  0504 09/08/22  0500 09/07/22  0508   SODIUM mmol/L 127* 133* 129* 129*   POTASSIUM mmol/L 4.9 5.8* 5.6* 5.6*   CHLORIDE mmol/L 93* 99 98 99   CO2 mmol/L 22.0 24.8 19.6* 21.0*   BUN mg/dL 29* 38* 26* 27*   CREATININE mg/dL 3.31* 3.93* 3.19* 3.22*   GLUCOSE mg/dL 210* 156* 209* 257*   Estimated Creatinine Clearance: 17 mL/min (A) (by C-G formula based on SCr of 3.31 mg/dL (H)).  Results from last 7 days   Lab Units 09/10/22  1107 09/09/22  0504 09/08/22  0500 09/07/22  0508 09/06/22  0535 09/05/22  0612 09/04/22  0616   CALCIUM mg/dL 7.8* 8.3* 8.0* 7.4* 7.7* 7.8* 7.8*   ALBUMIN g/dL 3.20* 3.50 3.00* 2.90* 3.30* 2.90* 2.90*   MAGNESIUM mg/dL  --   --   --   --  1.9 1.8 1.8   PHOSPHORUS mg/dL 3.1 4.1 3.0 3.4 3.2 4.2 3.6          apixaban, 5 mg, Oral, Q12H  aspirin, 81 mg, Oral, Daily  cloNIDine, 0.2 mg, Oral, Q12H  dilTIAZem, 30 mg, Oral, Q8H  epoetin brooke-epbx, 10,000 Units, Subcutaneous, Once per day on Mon Wed Fri  folic acid, 1 mg, Oral, Daily  gabapentin, 100 mg, Oral, Daily With Lunch  insulin lispro, 0-7 Units, Subcutaneous, TID AC  levothyroxine, 250 mcg, Oral, Q AM  losartan, 100 mg, Oral, Q24H  metoprolol tartrate, 100 mg, Oral, Q12H  miconazole, 1 application, Topical, Q12H  pantoprazole, 40 mg, Oral, Daily  predniSONE, 5 mg, Oral, Daily With Breakfast  rOPINIRole, 0.75 mg, Oral, Nightly  sertraline, 150 mg, Oral, Daily  sodium zirconium cyclosilicate, 5 g, Oral, Daily       Diet Regular; Cardiac    Assessment & Plan      Active Hospital Problems    Diagnosis  POA   • **Acute pulmonary edema (HCC) [J81.0]  Yes   • Atrial flutter (HCC) [I48.92]  Yes   • Chronic systolic CHF (congestive heart failure) (HCC) [I50.22]  Yes   • Thrombus of venous dialysis catheter (HCC) [T82.868A]  Yes   • Anemia due to chronic kidney disease, on chronic dialysis (HCC) [N18.6, D63.1, Z99.2]  Not Applicable   • CAD (coronary artery disease) [I25.10]  Yes   • ESRD (end stage renal disease) (HCC) [N18.6]  Yes   •  Hypothyroidism [E03.9]  Yes   • Type 2 diabetes mellitus with kidney complication, with long-term current use of insulin (HCC) [E11.29, Z79.4]  Not Applicable      Resolved Hospital Problems   No resolved problems to display.       · Acute Pulmonary Edema: Volume removal with HD. Wean O2 as tolerated. Nephrology following.  · ESRD-on HD  · Atrial Flutter: on metoprolol and diltiazem for rate control. eliquis for AC.  · Dialysis catheter thrombus: Extended into atrium on prior echo. PFO noted. On chronic AC for this with eliquis.  · Acute on chronic systolic heart failure with recovery of EF-managed with dialysis  · HTN: hydralazine held to assist with HR per nephrology  · Hypothyroidism: TSH still very elevated. 99. This is improvement compared to last year when it was 150s. Synthroid dose increased this admission. Repeat TSH 1-2 months as outpatient.  · ACD: Epo noted.  · DM2 with hyperglycemia and intermittent hypoglycemia: A1c 7.7. Hypoglycemia again on low dose lantus. Will stick to SSI  · RLS-increase requip to 1 mg qhs  · PPx: AC as above  · Disposition: /1-2 days    Ronnie Baptiste MD  [facility]  09/10/22  15:12 EDT    Dictated portions using Dragon dictation software.    During the entire encounter, I was wearing recommended PPE including face mask and eye protection. Hand sanitization was performed prior to entering room and upon exit.   Melolabial Interpolation Flap Text: A decision was made to reconstruct the defect utilizing an interpolation axial flap and a staged reconstruction.  A telfa template was made of the defect.  This telfa template was then used to outline the melolabial interpolation flap.  The donor area for the pedicle flap was then injected with anesthesia.  The flap was excised through the skin and subcutaneous tissue down to the layer of the underlying musculature.  The pedicle flap was carefully excised within this deep plane to maintain its blood supply.  The edges of the donor site were undermined.   The donor site was closed in a primary fashion.  The pedicle was then rotated into position and sutured.  Once the tube was sutured into place, adequate blood supply was confirmed with blanching and refill.  The pedicle was then wrapped with xeroform gauze and dressed appropriately with a telfa and gauze bandage to ensure continued blood supply and protect the attached pedicle.

## 2023-05-03 PROBLEM — R56.9 SEIZURE: Status: ACTIVE | Noted: 2023-01-01

## 2023-05-03 NOTE — ED NOTES
Nursing report ED to floor  Zaina Martinez  57 y.o.  female    HPI :   Chief Complaint   Patient presents with    Altered Mental Status       Admitting doctor:   Dimitri Pena MD    Admitting diagnosis:   The primary encounter diagnosis was Seizure. Diagnoses of Postictal state, End-stage renal disease on hemodialysis, Hyperkalemia, Hypertensive urgency, Elevated troponin, Elevated TSH, Do not resuscitate, and Acute UTI were also pertinent to this visit.    Code status:   Current Code Status       Date Active Code Status Order ID Comments User Context       Prior            Allergies:   Contrast dye (echo or unknown ct/mr), Iodinated contrast media, Ibuprofen, and Prochlorperazine    Isolation:   No active isolations    Intake and Output  No intake or output data in the 24 hours ending 05/03/23 1610    Weight:       05/03/23  1138   Weight: 61.4 kg (135 lb 5.8 oz)       Most recent vitals:   Vitals:    05/03/23 1340 05/03/23 1354 05/03/23 1436 05/03/23 1536   BP: (!) 179/101  (!) 157/104 (!) 168/121   BP Location: Right arm  Right arm Right arm   Patient Position: Lying  Lying Lying   Pulse: 78 66 73 87   Resp: 24 22 22 22   Temp:       TempSrc:       SpO2: 98% 97% 95% 98%   Weight:       Height:           Active LDAs/IV Access:   Lines, Drains & Airways       Active LDAs       Name Placement date Placement time Site Days    Peripheral IV 05/03/23 1507 Posterior;Right Hand 05/03/23  1507  Hand  less than 1    Peripheral IV 05/03/23 1520 Left Antecubital 05/03/23  1520  Antecubital  less than 1    Hemodialysis Cath Double 05/03/22  0909  Internal Jugular  365                    Labs (abnormal labs have a star):   Labs Reviewed   COMPREHENSIVE METABOLIC PANEL - Abnormal; Notable for the following components:       Result Value    BUN 22 (*)     Creatinine 2.07 (*)     Sodium 132 (*)     Potassium 6.0 (*)     Chloride 97 (*)     CO2 30.0 (*)     Calcium 8.4 (*)     Albumin 3.0 (*)     Alkaline Phosphatase 133 (*)     eGFR  27.5 (*)     All other components within normal limits    Narrative:     GFR Normal >60  Chronic Kidney Disease <60  Kidney Failure <15     URINALYSIS W/ CULTURE IF INDICATED - Abnormal; Notable for the following components:    Appearance, UA Cloudy (*)     pH, UA >=9.0 (*)     Blood, UA Moderate (2+) (*)     Protein, UA >=300 mg/dL (3+) (*)     Leuk Esterase, UA Large (3+) (*)     All other components within normal limits    Narrative:     In absence of clinical symptoms, the presence of pyuria, bacteria, and/or nitrites on the urinalysis result does not correlate with infection.   TROPONIN - Abnormal; Notable for the following components:    HS Troponin T 244 (*)     All other components within normal limits    Narrative:     High Sensitive Troponin T Reference Range:  <10.0 ng/L- Negative Female for AMI  <15.0 ng/L- Negative Male for AMI  >=10 - Abnormal Female indicating possible myocardial injury.  >=15 - Abnormal Male indicating possible myocardial injury.   Clinicians would have to utilize clinical acumen, EKG, Troponin, and serial changes to determine if it is an Acute Myocardial Infarction or myocardial injury due to an underlying chronic condition.        URINE DRUG SCREEN - Abnormal; Notable for the following components:    THC, Screen, Urine Positive (*)     All other components within normal limits    Narrative:     Negative Thresholds Per Drugs Screened:    Amphetamines                 500 ng/ml  Barbiturates                 200 ng/ml  Benzodiazepines              100 ng/ml  Cocaine                      300 ng/ml  Methadone                    300 ng/ml  Opiates                      300 ng/ml  Oxycodone                    100 ng/ml  THC                           50 ng/ml  Fentanyl                       5 ng/ml      The Normal Value for all drugs tested is negative. This report includes final unconfirmed screening results to be used for medical treatment purposes only. Unconfirmed results must not be  used for non-medical purposes such as employment or legal testing. Clinical consideration should be applied to any drug of abuse test, particularly when unconfirmed results are used.           TSH - Abnormal; Notable for the following components:    TSH 32.800 (*)     All other components within normal limits   CBC WITH AUTO DIFFERENTIAL - Abnormal; Notable for the following components:    RBC 3.66 (*)     Hemoglobin 10.0 (*)     Hematocrit 30.8 (*)     RDW 17.2 (*)     Platelets 104 (*)     Lymphocyte % 18.9 (*)     All other components within normal limits   HIGH SENSITIVITIY TROPONIN T 2HR - Abnormal; Notable for the following components:    HS Troponin T 187 (*)     Troponin T Delta -57 (*)     All other components within normal limits    Narrative:     High Sensitive Troponin T Reference Range:  <10.0 ng/L- Negative Female for AMI  <15.0 ng/L- Negative Male for AMI  >=10 - Abnormal Female indicating possible myocardial injury.  >=15 - Abnormal Male indicating possible myocardial injury.   Clinicians would have to utilize clinical acumen, EKG, Troponin, and serial changes to determine if it is an Acute Myocardial Infarction or myocardial injury due to an underlying chronic condition.        BASIC METABOLIC PANEL - Abnormal; Notable for the following components:    Glucose 120 (*)     Creatinine 1.80 (*)     Potassium 5.5 (*)     Calcium 7.3 (*)     eGFR 32.5 (*)     All other components within normal limits    Narrative:     GFR Normal >60  Chronic Kidney Disease <60  Kidney Failure <15     URINALYSIS, MICROSCOPIC ONLY - Abnormal; Notable for the following components:    RBC, UA 13-20 (*)     WBC, UA Too Numerous to Count (*)     Bacteria, UA 2+ (*)     All other components within normal limits   COVID-19,[facility] NAZ IN-HOUSE CEPHEID/MARTY, NP SWAB IN TRANSPORT MEDIA 8-12 HR TAT - Normal    Narrative:     Fact sheet for providers: https://www.fda.gov/media/793301/download     Fact sheet for patients:  https://www.fda.gov/media/310929/download   PROTIME-INR - Normal   CK - Normal   MAGNESIUM - Normal   T4, FREE - Normal    Narrative:     Results may be falsely increased if patient taking Biotin.     POCT GLUCOSE FINGERSTICK - Normal   POCT GLUCOSE FINGERSTICK - Normal   COVID PRE-OP / PRE-PROCEDURE SCREENING ORDER (NO ISOLATION)    Narrative:     The following orders were created for panel order COVID PRE-OP / PRE-PROCEDURE SCREENING ORDER (NO ISOLATION) - Swab, Nasopharynx.  Procedure                               Abnormality         Status                     ---------                               -----------         ------                     COVID-19, NAZ IN-HOUSE...[[id number]]  Normal              Final result                 Please view results for these tests on the individual orders.   URINE CULTURE   ETHANOL   BLOOD GAS, ARTERIAL   POCT GLUCOSE FINGERSTICK   CBC AND DIFFERENTIAL    Narrative:     The following orders were created for panel order CBC & Differential.  Procedure                               Abnormality         Status                     ---------                               -----------         ------                     CBC Auto Differential[[id number]]        Abnormal            Final result                 Please view results for these tests on the individual orders.       EKG:   ECG 12 Lead Altered Mental Status   Final Result   HEART RATE= 72  bpm   RR Interval= 833  ms   AR Interval= 182  ms   P Horizontal Axis= 17  deg   P Front Axis= 73  deg   QRSD Interval= 100  ms   QT Interval= 424  ms   QRS Axis= -45  deg   T Wave Axis= 60  deg   - ABNORMAL ECG -   Sinus rhythm   Left anterior fascicular block   Borderline low voltage, extremity leads   Nonspecific T abnormalities, lateral leads   When compared with ECG of 20-Mar-2023 11:12:37,   No significant change   Electronically Signed By: Marv Paredes ([facility]) 03-May-2023 15:54:57   Date and Time of Study: 2023-05-03 11:53:07           Meds given in ED:   Medications   sodium chloride 0.9 % flush 10 mL (has no administration in time range)   LORazepam (ATIVAN) injection 1 mg (has no administration in time range)   acetaminophen (TYLENOL) tablet 650 mg (has no administration in time range)   amLODIPine (NORVASC) tablet 10 mg (has no administration in time range)   aspirin EC tablet 81 mg (has no administration in time range)   carvedilol (COREG) tablet 3.125 mg (has no administration in time range)   Desvenlafaxine Succinate ER 25 mg (has no administration in time range)   folic acid (FOLVITE) tablet 1 mg (has no administration in time range)   levETIRAcetam (KEPPRA) tablet 250 mg (has no administration in time range)   levothyroxine (SYNTHROID, LEVOTHROID) tablet 300 mcg (has no administration in time range)   melatonin tablet 3 mg (has no administration in time range)   pantoprazole (PROTONIX) EC tablet 40 mg (has no administration in time range)   rOPINIRole (REQUIP) tablet 2 mg (has no administration in time range)   sennosides-docusate (PERICOLACE) 8.6-50 MG per tablet 2 tablet (has no administration in time range)   albuterol (PROVENTIL) nebulizer solution 0.5% 2.5 mg/0.5mL (has no administration in time range)   insulin regular (humuLIN R,novoLIN R) injection 5 Units (has no administration in time range)   labetalol (NORMODYNE,TRANDATE) injection 20 mg (has no administration in time range)   hydrALAZINE (APRESOLINE) tablet 100 mg (has no administration in time range)   levETIRAcetam (KEPPRA) injection 1,000 mg (has no administration in time range)   levETIRAcetam (KEPPRA) injection 1,000 mg (1,000 mg Intravenous Given 5/3/23 1206)   labetalol (NORMODYNE,TRANDATE) injection 20 mg (20 mg Intravenous Given 5/3/23 1339)   dextrose (D50W) (25 g/50 mL) IV injection 50 mL (50 mL Intravenous Given 5/3/23 1341)   insulin regular (humuLIN R,novoLIN R) injection 5 Units (5 Units Intravenous Given 5/3/23 1341)   sodium bicarbonate injection 8.4%  50 mEq (50 mEq Intravenous Given 5/3/23 1348)   calcium gluconate 1g/50ml 0.675% NaCl IV SOLN (0 g Intravenous Hold 5/3/23 1354)   albuterol (PROVENTIL) nebulizer solution 0.5% 2.5 mg/0.5mL (10 mg Nebulization Given 5/3/23 1354)       Imaging results:  CT Head Without Contrast    Result Date: 5/3/2023  1. This patient was moving quite a bit during the exam and motion artifact causes blurring of many of the images and slightly limits evaluation. 2. Overall, no acute intracranial abnormality is identified with no significant change when compared to prior noncontrast head CT from [facility] on [facility]. 3. There is a 7 x 2 mm area of hyperdensity in the superior left frontal lobe compatible with an area of stable chronic hemosiderin deposition where the patient had a remote prior hemorrhage at this location back on 04/29/2022. There is mild-to-moderate small vessel disease in cerebral white matter and mild cerebral atrophy. 4. Patient has a nasopharyngeal tube with the tube extending through the right nasal cavity into the nasopharynx and the inferior aspect of the tube is not assessed on this exam. The remainder of the head CT is normal.  Radiation dose reduction techniques were utilized, including automated exposure control and exposure modulation based on body size.  This report was finalized on 5/3/2023 3:28 PM by Dr. Suhas Sanches M.D.      XR Chest 1 View    Result Date: 5/3/2023  No change in the abnormal appearance of the left hemithorax.  This report was finalized on 5/3/2023 12:40 PM by Dr. Adonis Burch M.D.       Ambulatory status:   - complete    Social issues:   Social History     Socioeconomic History    Marital status:      Spouse name: Marv   Tobacco Use    Smoking status: Never    Smokeless tobacco: Never   Vaping Use    Vaping Use: Never used   Substance and Sexual Activity    Alcohol use: Never    Drug use: Never    Sexual activity: Defer       NIH Stroke Scale:          Sara Grace RN  05/03/23 16:10 EDT

## 2023-05-03 NOTE — ED NOTES
Pt arrives via EMS for confusion/altered mental status x3 days. Upon EMS arrival to home, pt was seizing (hx of seizures). Pt is a diabetic, hx of stroke, and has dialysis.    This RN in appropriate PPE while in pt room. Pt wearing mask

## 2023-05-03 NOTE — CODE DOCUMENTATION
Patient Name:  Zaina Martinez  YOB: 1965  MRN:  [id number]  Admit Date:  5/3/2023    Visit Diagnoses:     ICD-10-CM ICD-9-CM   1. Seizure  R56.9 780.39   2. Postictal state  R56.9 780.39   3. End-stage renal disease on hemodialysis  N18.6 585.6    Z99.2 V45.11   4. Hyperkalemia  E87.5 276.7   5. Hypertensive urgency  I16.0 401.9   6. Elevated troponin  R77.8 790.6   7. Elevated TSH  R79.89 794.5   8. Do not resuscitate  Z66 V49.86   9. Acute UTI  N39.0 599.0       Reason For Rapid: seizure activity    RN Communicated With: Dr. Pena, Dr. Fabian       Rapid Outcome: stay on unit    Communication From Rapid Team: spoke with  Juan and pt's daughter. They state she has been discussing palliative care with them, she does not want CPR or intubation. They are not sure they want comfort care because they are not ready to stop her dialysis treatment, but understand how ill she is, and she has discussed stopping treatments recently. Spoke with Dr. Fabian for additional medications. Family at bedside, Not transferring to ICU at this time.     Most Recent Vital Signs  Temp:  [97 °F (36.1 °C)-97.8 °F (36.6 °C)] 97.8 °F (36.6 °C)  Heart Rate:  [66-95] 95  Resp:  [20-24] 20  BP: (156-200)/() 166/131  SpO2:  [95 %-100 %] 100 %  on   ;   Device (Oxygen Therapy): room air    Labs:  Results from last 7 days   Lab Units 05/03/23  1156   COVID19  Not Detected     Glucose   Date/Time Value Ref Range Status   05/03/2023 1903 119 70 - 130 mg/dL Final     Comment:     Meter: [id number] : [id number] Sunni Mathiasdy NA   05/03/2023 1713 122 70 - 130 mg/dL Final     Comment:     Meter: [id number] : [id number] Katrin Dian NA   05/03/2023 1515 120 70 - 130 mg/dL Final     Comment:     Meter: [id number] : [id number] Geni SAAVEDRA   05/03/2023 1141 82 70 - 130 mg/dL Final     Comment:     Meter: [id number] : [id number] SAAVEDRA     Site   Date Value Ref Range Status   05/03/2023  Arterial: left radial  Final     Omar's Test   Date Value Ref Range Status   05/03/2023 Positive  Final     pH, Arterial   Date Value Ref Range Status   05/03/2023 7.459 (H) 7.350 - 7.450 pH units Final     pCO2, Arterial   Date Value Ref Range Status   05/03/2023 44.6 35.0 - 45.0 mm Hg Final     pO2, Arterial   Date Value Ref Range Status   05/03/2023 77.5 (L) 80.0 - 100.0 mm Hg Final     HCO3, Arterial   Date Value Ref Range Status   05/03/2023 31.7 (H) 22.0 - 28.0 mmol/L Final     Base Excess, Arterial   Date Value Ref Range Status   05/03/2023 6.9 (H) 0.0 - 2.0 mmol/L Final     Barometric Pressure for Blood Gas   Date Value Ref Range Status   05/03/2023 745.2 mmHg Final     Modality   Date Value Ref Range Status   05/03/2023 Room Air  Final     FIO2   Date Value Ref Range Status   05/03/2023 21 % Final     Results from last 7 days   Lab Units 05/03/23  1205   WBC 10*3/mm3 7.71   HEMOGLOBIN g/dL 10.0*   PLATELETS 10*3/mm3 104*     Results from last 7 days   Lab Units 05/03/23  1527 05/03/23  1205   SODIUM mmol/L 137 132*   POTASSIUM mmol/L 5.5* 6.0*   CHLORIDE mmol/L 105 97*   CO2 mmol/L 25.3 30.0*   BUN mg/dL 19 22*   CREATININE mg/dL 1.80* 2.07*   GLUCOSE mg/dL 120* 85   ALBUMIN g/dL  --  3.0*   BILIRUBIN mg/dL  --  0.5   ALK PHOS U/L  --  133*   AST (SGOT) U/L  --  28   ALT (SGPT) U/L  --  8   Estimated Creatinine Clearance: 29.7 mL/min (A) (by C-G formula based on SCr of 1.8 mg/dL (H)).  Results from last 7 days   Lab Units 05/03/23  1527 05/03/23  1205   CK TOTAL U/L  --  44   HSTROP T ng/L 187* 244*   PROBNP pg/mL >70,000.0*  --          Results from last 7 days   Lab Units 05/03/23  1608   PH, ARTERIAL pH units 7.459*   PO2 ART mm Hg 77.5*   PCO2, ARTERIAL mm Hg 44.6   HCO3 ART mmol/L 31.7*   MODALITY  Room Air   No results found for: STREPPNEUAG, LEGANTIGENUR        NIH Stroke Scale:                                                         Please refer to full rapid documentation on summary page under  Index / Code Timeline

## 2023-05-03 NOTE — H&P
History and physical    Primary care physician      Chief complaint  Altered mental status  History of present illness  57-year-old white female with very complex past medical history and well-known to our service including CVA end-stage renal disease congestive heart failure hypertension hyperlipidemia hypothyroidism and seizure disorder brought to the emergency room by the  with worsening mental status and evaluated in ER found to have postictal state admit for management.  Patient also found to have UTI with metabolic encephalopathy.  Patient is DNR per her wishes.  Patient is unable to give detailed history most of the history obtained per chart nursing staff old record and .  Patient has no fever chills chest pain shortness of breath and has not missed any hemodialysis.    PAST MEDICAL HISTORY  • Acute on chronic diastolic Congestive heart failure     • Insulin-dependent diabetes mellitus     • Hypothyroidism     • Gastroesophageal reflux disease     • Hypertension     • End-stage renal disease on hemodialysis        PAST SURGICAL HISTORY              Procedure Laterality Date   • CHOLECYSTECTOMY WITH INTRAOPERATIVE CHOLANGIOGRAM N/A 1/10/2022     Procedure: Laparoscopic cholecystectomy with intraoperative cholangiogram;  Surgeon: Ramana Raygoza MD;  Location: [facility];  Service: General;  Laterality: N/A;   • CYSTOSCOPY W/ URETERAL STENT PLACEMENT Left 9/29/2022     Procedure: CYSTOSCOPY URETERAL STENT INSERTION WITH RETROGRADE PYELOGRAM;  Surgeon: Bryant Phan Jr., MD;  Location: [facility];  Service: Urology;  Laterality: Left;   • CYSTOSCOPY W/ URETERAL STENT PLACEMENT Left 1/10/2023     Procedure: CYSTOSCOPY LEFT STENT REMOVAL;  Surgeon: Bryant Phan Jr., MD;  Location: [facility] OR;  Service: Urology;  Laterality: Left;   • EMBOLIZATION MESENTERIC ARTERY N/A 1/1/2023     Procedure: LEFT KIDNEY EMBOLIZATION;  Surgeon: Bijan Ordoñez MD;  Location:   [facility] OR 18/19;  Service: Interventional Radiology;  Laterality: N/A;   • EYE SURGERY       • FEMUR IM NAILING/RODDING Right 11/10/2022     Procedure: FEMUR INTRAMEDULLARY NAILING/RODDING;  Surgeon: Glen Pierce MD;  Location: [facility];  Service: Orthopedics;  Laterality: Right;   • HIP INTERTROCHANTERIC NAILING Right 11/10/2022     Procedure: HIP INTERTROCHANTERIC NAILING;  Surgeon: Glen Pierce MD;  Location: [facility];  Service: Orthopedics;  Laterality: Right;   • HYSTERECTOMY       • INSERT CENTRAL LINE AT BEDSIDE   12/31/2022         • INSERTION HEMODIALYSIS CATHETER N/A 12/6/2021     Procedure: HEMODIALYSIS CATHETER INSERTION;  Surgeon: Keli Salazar MD;  Location: [facility] 18/19;  Service: Vascular;  Laterality: N/A;   • INSERTION HEMODIALYSIS CATHETER N/A 5/3/2022     Procedure: TUNNELED CATHETER PLACEMENT;  Surgeon: Keli Salazar MD;  Location: [facility];  Service: Vascular;  Laterality: N/A;   • MUSCLE BIOPSY Left 6/15/2022     Procedure: Left quadriceps muscle biopsy;  Surgeon: Marques Ma MD;  Location: [facility];  Service: Neurosurgery;  Laterality: Left;   • URETEROSCOPY LASER LITHOTRIPSY WITH STENT INSERTION Left 12/30/2022     Procedure: CYSTOSCOPY WITH LEFT  URETEROSCOPY  LEFT STENT EXCHANGE;  Surgeon: Bryant Phan Jr., MD;  Location: [facility];  Service: Urology;  Laterality: Left;         FAMILY HISTORY           Problem Relation Age of Onset   • Malig Hyperthermia Neg Hx        SOCIAL HISTORY                 Socioeconomic History   • Marital status:        Spouse name: Marv   Tobacco Use   • Smoking status: Never   • Smokeless tobacco: Never   Vaping Use   • Vaping Use: Never used   Substance and Sexual Activity   • Alcohol use: Never   • Drug use: Never   • Sexual activity: Defer         ALLERGIES  Contrast dye (echo or unknown ct/mr), Iodinated contrast media, Ibuprofen, and  "Prochlorperazine  Nursing home medications reviewed     REVIEW OF SYSTEMS  All systems reviewed and negative except for those discussed in HPI.     PHYSICAL EXAM  Blood pressure (!) 168/121, pulse 87, temperature 97 °F (36.1 °C), temperature source Tympanic, resp. rate 22, height 157.5 cm (62.01\"), weight 61.4 kg (135 lb 5.8 oz), SpO2 98 %, not currently breastfeeding.    GENERAL:  Restless confused but no respiratory distress   HENT: NCAT: nares patent: Neck supple  EYES: no scleral icterus  CV: regular rhythm, normal rate  RESPIRATORY: normal effort  ABDOMEN: soft, NTND: Bowel sounds positive  MUSCULOSKELETAL: no deformity  NEURO: alert with nonfocal neuro exam  PSYCH:  calm, cooperative  SKIN: warm, dry     LAB RESULTS  Lab Results (last 24 hours)     Procedure Component Value Units Date/Time    Blood Gas, Arterial - [[id number]]  (Abnormal) Collected: 05/03/23 1608    Specimen: Arterial Blood Updated: 05/03/23 1612     Site Arterial: left radial     Omar's Test Positive     pH, Arterial 7.459 pH units      pCO2, Arterial 44.6 mm Hg      pO2, Arterial 77.5 mm Hg      HCO3, Arterial 31.7 mmol/L      Base Excess, Arterial 6.9 mmol/L      O2 Saturation Calculated 95.9 %      Comment: Meter: [id number] : [id number] Alan Kline DO2 0.8 mmHg      Barometric Pressure for Blood Gas 745.2 mmHg      Modality Room Air     FIO2 21 %      Rate 24 Breaths/minute     High Sensitivity Troponin T 2Hr [[id number]]  (Abnormal) Collected: 05/03/23 1527    Specimen: Blood Updated: 05/03/23 1559     HS Troponin T 187 ng/L      Troponin T Delta -57 ng/L     Narrative:      High Sensitive Troponin T Reference Range:  <10.0 ng/L- Negative Female for AMI  <15.0 ng/L- Negative Male for AMI  >=10 - Abnormal Female indicating possible myocardial injury.  >=15 - Abnormal Male indicating possible myocardial injury.   Clinicians would have to utilize clinical acumen, EKG, Troponin, and serial changes to determine if it is an " Acute Myocardial Infarction or myocardial injury due to an underlying chronic condition.         Basic Metabolic Panel [[id number]]  (Abnormal) Collected: 05/03/23 1527    Specimen: Blood Updated: 05/03/23 1558     Glucose 120 mg/dL      BUN 19 mg/dL      Creatinine 1.80 mg/dL      Sodium 137 mmol/L      Potassium 5.5 mmol/L      Comment: Slight hemolysis detected by analyzer. Results may be affected.        Chloride 105 mmol/L      CO2 25.3 mmol/L      Calcium 7.3 mg/dL      BUN/Creatinine Ratio 10.6     Anion Gap 6.7 mmol/L      eGFR 32.5 mL/min/1.73     Narrative:      GFR Normal >60  Chronic Kidney Disease <60  Kidney Failure <15      Urinalysis, Microscopic Only - Straight Cath [[id number]]  (Abnormal) Collected: 05/03/23 1212    Specimen: Urine from Straight Cath Updated: 05/03/23 1536     RBC, UA 13-20 /HPF      WBC, UA Too Numerous to Count /HPF      Bacteria, UA 2+ /HPF      Squamous Epithelial Cells, UA 0-2 /HPF      Hyaline Casts, UA None Seen /LPF      WBC Clumps, UA Small/1+ /HPF      Methodology Manual Light Microscopy    Urine Culture - Urine, Straight Cath [[id number]] Collected: 05/03/23 1212    Specimen: Urine from Straight Cath Updated: 05/03/23 1536    Urinalysis With Culture If Indicated - Straight Cath [[id number]]  (Abnormal) Collected: 05/03/23 1212    Specimen: Urine from Straight Cath Updated: 05/03/23 1525     Color, UA Yellow     Appearance, UA Cloudy     pH, UA >=9.0     Specific Gravity, UA 1.013     Glucose, UA Negative     Ketones, UA Negative     Bilirubin, UA Negative     Blood, UA Moderate (2+)     Protein, UA >=300 mg/dL (3+)     Leuk Esterase, UA Large (3+)     Nitrite, UA Negative     Urobilinogen, UA 0.2 E.U./dL    Narrative:      In absence of clinical symptoms, the presence of pyuria, bacteria, and/or nitrites on the urinalysis result does not correlate with infection.    POC Glucose Once [[id number]]  (Normal) Collected: 05/03/23 1515    Specimen: Blood Updated: 05/03/23 1516      Glucose 120 mg/dL      Comment: Meter: [id number] : [id number] Geni SAAVEDRA       COVID PRE-OP / PRE-PROCEDURE SCREENING ORDER (NO ISOLATION) - Swab, Nasopharynx [[id number]]  (Normal) Collected: 05/03/23 1156    Specimen: Swab from Nasopharynx Updated: 05/03/23 1314    Narrative:      The following orders were created for panel order COVID PRE-OP / PRE-PROCEDURE SCREENING ORDER (NO ISOLATION) - Swab, Nasopharynx.  Procedure                               Abnormality         Status                     ---------                               -----------         ------                     COVID-19,[facility] IN-HOUSE...[[id number]]  Normal              Final result                 Please view results for these tests on the individual orders.    COVID-19,[facility] IN-HOUSE CEPHEID/MARTY NP SWAB IN TRANSPORT MEDIA 8-12 HR TAT - Swab, Nasopharynx [[id number]]  (Normal) Collected: 05/03/23 1156    Specimen: Swab from Nasopharynx Updated: 05/03/23 1314     COVID19 Not Detected    Narrative:      Fact sheet for providers: https://www.fda.gov/media/930249/download     Fact sheet for patients: https://www.fda.gov/media/209990/download    High Sensitivity Troponin T [[id number]]  (Abnormal) Collected: 05/03/23 1205    Specimen: Blood Updated: 05/03/23 1303     HS Troponin T 244 ng/L     Narrative:      High Sensitive Troponin T Reference Range:  <10.0 ng/L- Negative Female for AMI  <15.0 ng/L- Negative Male for AMI  >=10 - Abnormal Female indicating possible myocardial injury.  >=15 - Abnormal Male indicating possible myocardial injury.   Clinicians would have to utilize clinical acumen, EKG, Troponin, and serial changes to determine if it is an Acute Myocardial Infarction or myocardial injury due to an underlying chronic condition.         TSH [[id number]]  (Abnormal) Collected: 05/03/23 1205    Specimen: Blood Updated: 05/03/23 1302     TSH 32.800 uIU/mL     T4, Free [[id number]]  (Normal) Collected: 05/03/23 1205    Specimen:  Blood Updated: 05/03/23 1302     Free T4 1.48 ng/dL     Narrative:      Results may be falsely increased if patient taking Biotin.      Urine Drug Screen - Straight Cath [[id number]]  (Abnormal) Collected: 05/03/23 1213    Specimen: Urine from Straight Cath Updated: 05/03/23 1259     Amphet/Methamphet, Screen Negative     Barbiturates Screen, Urine Negative     Benzodiazepine Screen, Urine Negative     Cocaine Screen, Urine Negative     Opiate Screen Negative     THC, Screen, Urine Positive     Methadone Screen, Urine Negative     Oxycodone Screen, Urine Negative     Fentanyl, Urine Negative    Narrative:      Negative Thresholds Per Drugs Screened:    Amphetamines                 500 ng/ml  Barbiturates                 200 ng/ml  Benzodiazepines              100 ng/ml  Cocaine                      300 ng/ml  Methadone                    300 ng/ml  Opiates                      300 ng/ml  Oxycodone                    100 ng/ml  THC                           50 ng/ml  Fentanyl                       5 ng/ml      The Normal Value for all drugs tested is negative. This report includes final unconfirmed screening results to be used for medical treatment purposes only. Unconfirmed results must not be used for non-medical purposes such as employment or legal testing. Clinical consideration should be applied to any drug of abuse test, particularly when unconfirmed results are used.            Ethanol [[id number]] Collected: 05/03/23 1205    Specimen: Blood Updated: 05/03/23 1258     Ethanol <10 mg/dL      Ethanol % <0.010 %     CK [[id number]]  (Normal) Collected: 05/03/23 1205    Specimen: Blood Updated: 05/03/23 1258     Creatine Kinase 44 U/L     Magnesium [[id number]]  (Normal) Collected: 05/03/23 1205    Specimen: Blood Updated: 05/03/23 1258     Magnesium 1.7 mg/dL     Comprehensive Metabolic Panel [[id number]]  (Abnormal) Collected: 05/03/23 1205    Specimen: Blood Updated: 05/03/23 1258     Glucose 85 mg/dL      BUN 22  mg/dL      Creatinine 2.07 mg/dL      Sodium 132 mmol/L      Potassium 6.0 mmol/L      Chloride 97 mmol/L      CO2 30.0 mmol/L      Calcium 8.4 mg/dL      Total Protein 6.8 g/dL      Albumin 3.0 g/dL      ALT (SGPT) 8 U/L      AST (SGOT) 28 U/L      Alkaline Phosphatase 133 U/L      Total Bilirubin 0.5 mg/dL      Globulin 3.8 gm/dL      A/G Ratio 0.8 g/dL      BUN/Creatinine Ratio 10.6     Anion Gap 5.0 mmol/L      eGFR 27.5 mL/min/1.73     Narrative:      GFR Normal >60  Chronic Kidney Disease <60  Kidney Failure <15      Protime-INR [[id number]]  (Normal) Collected: 05/03/23 1205    Specimen: Blood Updated: 05/03/23 1246     Protime 12.7 Seconds      INR 0.94    CBC & Differential [[id number]]  (Abnormal) Collected: 05/03/23 1205    Specimen: Blood Updated: 05/03/23 1234    Narrative:      The following orders were created for panel order CBC & Differential.  Procedure                               Abnormality         Status                     ---------                               -----------         ------                     CBC Auto Differential[[id number]]        Abnormal            Final result                 Please view results for these tests on the individual orders.    CBC Auto Differential [[id number]]  (Abnormal) Collected: 05/03/23 1205    Specimen: Blood Updated: 05/03/23 1234     WBC 7.71 10*3/mm3      RBC 3.66 10*6/mm3      Hemoglobin 10.0 g/dL      Hematocrit 30.8 %      MCV 84.2 fL      MCH 27.3 pg      MCHC 32.5 g/dL      RDW 17.2 %      RDW-SD 52.8 fl      MPV 10.0 fL      Platelets 104 10*3/mm3      Neutrophil % 71.4 %      Lymphocyte % 18.9 %      Monocyte % 8.0 %      Eosinophil % 0.9 %      Basophil % 0.4 %      Neutrophils, Absolute 5.50 10*3/mm3      Lymphocytes, Absolute 1.46 10*3/mm3      Monocytes, Absolute 0.62 10*3/mm3      Eosinophils, Absolute 0.07 10*3/mm3      Basophils, Absolute 0.03 10*3/mm3     POC Glucose Once [[id number]]  (Normal) Collected: 05/03/23 1141    Specimen: Blood  Updated: 05/03/23 1143     Glucose 82 mg/dL      Comment: Meter: [id number] : [id number] Geni SAAVEDRA           Imaging Results (Last 24 Hours)     Procedure Component Value Units Date/Time    CT Head Without Contrast [[id number]] Collected: 05/03/23 1414     Updated: 05/03/23 1531    Narrative:      EMERGENCY NONCONTRAST HEAD CT ON 05/03/2023     CLINICAL HISTORY: Seizure, confusion.     TECHNIQUE: Spiral CT images were obtained from the base of skull to the  vertex without intravenous contrast and the images were reformatted and  are submitted in 3 mm thick axial, sagittal and coronal CT sections with  brain algorithm.     COMPARISON: This is correlated to 12 prior head CTs dating back to  02/23/2022 and head CT following left frontal lobe intraparenchymal  hemorrhage on 04/29/2022, 3 more recent head CTs on 12/04/2022 and  12/26/2022 and 01/13/2023, as well as prior MRIs of the brain on  04/28/2022 and 04/30/2022.     FINDINGS: The patient was moving during this exam and the exam is  slightly compromised by motion artifact which causes blurring of many of  the images and slightly limits evaluation. Back on 04/29/2022 the  patient had a 2 x 1.3 x 2 cm acute intraparenchymal hemorrhage in the  superior left frontal lobe. On the current head CT there is 7 x 2 mm  area of increased density at the site of prior hemorrhage that is felt  to be some hemosiderin deposition related to the prior hemorrhage. There  is some minimal surrounding low-density felt to be surrounding gliosis.  There is patchy low-density in the periventricular extending to  subcortical white matter of cerebral hemispheres consistent with  mild-to-moderate small vessel disease. The remainder of the brain  parenchyma is normal in attenuation. The ventricles are normal in size.  I see no mass effect and no midline shift and no extra-axial fluid  collections are identified and there is no evidence of acute  intracranial hemorrhage. Patient  has a nasopharyngeal tube extending  from the right nasal cavity into the pharynx and the distal aspect of  the tube is not assessed on this exam. Paranasal sinuses and mastoid air  cells and middle ear cavities are clear. The calvarium and skull base  are normal in appearance.       Impression:      1. This patient was moving quite a bit during the exam and motion  artifact causes blurring of many of the images and slightly limits  evaluation.  2. Overall, no acute intracranial abnormality is identified with no  significant change when compared to prior noncontrast head CT from  [facility] on 03/20/2023.  3. There is a 7 x 2 mm area of hyperdensity in the superior left frontal  lobe compatible with an area of stable chronic hemosiderin deposition  where the patient had a remote prior hemorrhage at this location back on  04/29/2022. There is mild-to-moderate small vessel disease in cerebral  white matter and mild cerebral atrophy.  4. Patient has a nasopharyngeal tube with the tube extending through the  right nasal cavity into the nasopharynx and the inferior aspect of the  tube is not assessed on this exam. The remainder of the head CT is  normal.     Radiation dose reduction techniques were utilized, including automated  exposure control and exposure modulation based on body size.     This report was finalized on 5/3/2023 3:28 PM by Dr. Suhas Sanches M.D.       XR Chest 1 View [[id number]] Collected: 05/03/23 1227     Updated: 05/03/23 1243    Narrative:      PORTABLE CHEST X-RAY     HISTORY: Shortness of breath.     Portable chest x-ray is provided. Correlation: 03/20/2023.     FINDINGS: There is a dual lumen right IJ dialysis catheter with its tip  in the right atrium and surgical clips from prior cholecystectomy. A  moderate volume left pleural effusion is again observed and there are  some air bronchograms in the left perihilar region which may be due to  atelectasis or pneumonia in the left  lung. Right lung remains clear and  free of volume overload, edema, or pneumonia. No pleural effusion on the  right.       Impression:      No change in the abnormal appearance of the left hemithorax.     This report was finalized on 5/3/2023 12:40 PM by Dr. Adonis Burch M.D.           ECG 12 Lead             Component  Ref Range & Units 11:53  (5/3/23) 1 mo ago  (3/20/23) 2 mo ago  (2/5/23) 3 mo ago  (1/22/23) 3 mo ago  (1/22/23) 4 mo ago  (1/1/23) 4 mo ago  (12/26/22)   QT Interval  ms 424  415  418  391  372  397  514    Resulting Agency [facility] ECG BH ECG BH ECG BH ECG  ECG  ECG  ECG             HEART RATE= 72  bpm  RR Interval= 833  ms  DC Interval= 182  ms  P Horizontal Axis= 17  deg  P Front Axis= 73  deg  QRSD Interval= 100  ms  QT Interval= 424  ms  QRS Axis= -45  deg  T Wave Axis= 60  deg  - ABNORMAL ECG -  Sinus rhythm  Left anterior fascicular block  Borderline low voltage, extremity leads  Nonspecific T abnormalities, lateral leads  When compared with ECG of 20-Mar-2023 11:12:37,  No significant change              Current Facility-Administered Medications:   •  acetaminophen (TYLENOL) tablet 650 mg, 650 mg, Oral, Q6H PRN, Dimitri Pena MD  •  albuterol (PROVENTIL) nebulizer solution 0.5% 2.5 mg/0.5mL, 10 mg, Nebulization, Q4H PRN, Dimitri Pena MD  •  amLODIPine (NORVASC) tablet 10 mg, 10 mg, Oral, Q24H, Dimitri Pena MD  •  aspirin EC tablet 81 mg, 81 mg, Oral, Daily, Dimitri Pena MD  •  carvedilol (COREG) tablet 3.125 mg, 3.125 mg, Oral, BID With Meals, Dimitri Pena MD  •  Desvenlafaxine Succinate ER 25 mg, 25 mg, Oral, Daily, Dimitri Pena MD  •  folic acid (FOLVITE) tablet 1 mg, 1 mg, Oral, Daily, Dimitri Pena MD  •  hydrALAZINE (APRESOLINE) injection 10 mg, 10 mg, Intravenous, Q4H PRN, Dimitri Pena MD  •  hydrALAZINE (APRESOLINE) tablet 100 mg, 100 mg, Oral, Q8H, Dimitri Pena MD  •  levETIRAcetam (KEPPRA) tablet 500 mg, 500 mg, Oral, BID, Dimitri Pena MD  •  levothyroxine (SYNTHROID,  LEVOTHROID) tablet 300 mcg, 300 mcg, Oral, Daily, Dimitri Pena MD  •  melatonin tablet 3 mg, 3 mg, Oral, Nightly PRN, Dimitri Pena MD  •  pantoprazole (PROTONIX) EC tablet 40 mg, 40 mg, Oral, Daily, Dimitri Pena MD  •  rOPINIRole (REQUIP) tablet 2 mg, 2 mg, Oral, Nightly, Dimitri Pena MD  •  sennosides-docusate (PERICOLACE) 8.6-50 MG per tablet 2 tablet, 2 tablet, Oral, Nightly PRN, Dimitri Pena MD  •  [COMPLETED] Insert Peripheral IV, , , Once **AND** sodium chloride 0.9 % flush 10 mL, 10 mL, Intravenous, PRN, Aguila Canseco MD    Current Outpatient Medications:   •  acetaminophen (TYLENOL) 325 MG tablet, Take 2 tablets by mouth Every 6 (Six) Hours As Needed for Mild Pain, Fever or Headache., Disp: , Rfl:   •  amLODIPine (NORVASC) 10 MG tablet, Take 1 tablet by mouth Daily., Disp: 30 tablet, Rfl: 0  •  aspirin 81 MG EC tablet, Take 1 tablet by mouth Daily., Disp: 30 tablet, Rfl: 0  •  carvedilol (COREG) 3.125 MG tablet, Take 1 tablet by mouth 2 (Two) Times a Day With Meals., Disp: 60 tablet, Rfl: 0  •  Desvenlafaxine Succinate ER 25 MG tablet sustained-release 24 hour, Take 1 tablet by mouth Daily for 30 days., Disp: 30 tablet, Rfl: 0  •  folic acid (FOLVITE) 1 MG tablet, Take 1 tablet by mouth Daily., Disp: , Rfl:   •  hydrALAZINE (APRESOLINE) 50 MG tablet, Take 1 tablet by mouth 2 (Two) Times a Day., Disp: , Rfl:   •  levETIRAcetam (Keppra) 250 MG tablet, Take 1 tablet by mouth 2 (Two) Times a Day., Disp: 60 tablet, Rfl: 0  •  levothyroxine (Synthroid) 150 MCG tablet, Take 2 tablets by mouth Daily., Disp: 60 tablet, Rfl: 0  •  melatonin 3 MG tablet, Take 1 tablet by mouth At Night As Needed for Sleep., Disp: , Rfl:   •  Methoxy PEG-Epoetin Beta (MIRCERA IJ), 100 mcg Every 14 (Fourteen) Days., Disp: , Rfl:   •  pantoprazole (PROTONIX) 40 MG EC tablet, Take 1 tablet by mouth Daily., Disp: 90 tablet, Rfl: 0  •  rOPINIRole (REQUIP) 2 MG tablet, Take 1 tablet by mouth Every Night., Disp: 30 tablet, Rfl: 0  •   sennosides-docusate (PERICOLACE) 8.6-50 MG per tablet, Take 2 tablets by mouth At Night As Needed for Constipation., Disp: , Rfl:   •  vitamin D (ERGOCALCIFEROL) 1.25 MG (02611 UT) capsule capsule, Take 1 capsule by mouth Every 7 (Seven) Days., Disp: 5 capsule, Rfl:      ASSESSMENT  Recurrent seizure disorder with postictal state  Metabolic encephalopathy  Acute UTI  End-stage renal disease on hemodialysis  Recent right MCA stroke  Chronic diastolic congestive heart failure  Insulin-dependent diabetes mellitus  Hypertension  Hyperlipidemia  Hypothyroidism  Chronic anemia  Gastroesophageal reflux disease    PLAN  Admit  IV Keppra  IV antibiotics  Need urgent hemodialysis  Neurology consult  Nephrology to follow patient for hemodialysis  Adjust home medications  Stress ulcer DVT prophylaxis  Supportive care  DNR with poor prognosis  Discussed with  and nursing staff  Follow closely further recommendation according to hospital course    TERRELL DELVALLE MD       Dapsone Pregnancy And Lactation Text: This medication is Pregnancy Category C and is not considered safe during pregnancy or breast feeding.

## 2023-05-03 NOTE — ED PROVIDER NOTES
EMERGENCY DEPARTMENT ENCOUNTER    Room Number:  S509/1  Date seen:  5/3/2023  PCP: Norman Serrato MD  Historian: EMS      HPI:  Chief Complaint: Altered mental status  A complete HPI/ROS/PMH/PSH/SH/FH are unobtainable due to: Confusion and nonverbal  Context: Zaina Martinez is a 57 y.o. female who presents to the ED via EMS from home.  EMS states that the  told them that she has not been acting right for the past 3 days but that this morning she was more confused than usual and thus he called 911.  EMS states when they got to the house the patient had a 1 minute tonic-clonic seizure with resultant postictal state and right-sided paralysis.  On arrival to the ER the patient is moving all 4 extremities but appears to be postictal and not answering questions or following commands.  I can ascertain no further history at this time.      PAST MEDICAL HISTORY  Active Ambulatory Problems     Diagnosis Date Noted   • Renal insufficiency 03/16/2018   • Hypertensive disorder 08/25/2021   • Hypothyroidism 11/29/2021   • Type 2 diabetes mellitus with kidney complication, with long-term current use of insulin 05/01/2018   • Rheumatoid arthritis 03/30/2021   • Angioedema 11/30/2021   • Esophageal dysmotility 10/15/2021   • Anemia 03/16/2018   • Medically noncompliant 12/01/2021   • Myocardial infarction due to demand ischemia 12/01/2021   • Enteritis 12/03/2021   • PRES (posterior reversible encephalopathy syndrome) 12/05/2021   • Urine retention 12/08/2021   • Klebsiella infection 12/08/2021   • Superficial thrombophlebitis 12/10/2021   • Generalized weakness 12/19/2021   • ESRD (end stage renal disease) 12/20/2021   • CAD (coronary artery disease) 12/20/2021   • Abnormal urinalysis 12/20/2021   • Chronic diastolic CHF (congestive heart failure) 12/25/2021   • Pyelonephritis 01/09/2022   • Calculus of gallbladder with acute on chronic cholecystitis without obstruction 01/09/2022   • Pleural effusion on right 01/09/2022   •  Anemia due to chronic kidney disease, on chronic dialysis 01/11/2022   • Abnormal findings on diagnostic imaging of other specified body structures 04/04/2017   • Acute upper respiratory infection 10/31/2018   • Agitation 03/30/2021   • Alkaline phosphatase raised 03/16/2018   • Casts present in urine 03/29/2021   • Cellulitis of toe 11/15/2019   • Hip pain 06/19/2020   • Community acquired pneumonia 02/13/2017   • Depressive disorder 10/31/2018   • Diarrhea of presumed infectious origin 03/30/2021   • Difficult or painful urination 08/11/2020   • Disease due to severe acute respiratory syndrome coronavirus 2 (SARS-CoV-2) 03/30/2021   • Dyspnea 11/15/2019   • Encounter for follow-up examination after completed treatment for conditions other than malignant neoplasm 02/13/2017   • H/O: hypothyroidism 08/25/2021   • Hyperlipidemia 11/30/2018   • Hypomagnesemia 03/30/2021   • Intractable vomiting with nausea 03/29/2021   • Leukocytosis 03/16/2018   • 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   • Need for influenza vaccination 11/30/2018   • Restless legs 11/15/2019   • Noncompliance with treatment 10/31/2018   • Shoulder pain 06/19/2020   • Acute UTI (urinary tract infection) 07/17/2018   • Metabolic encephalopathy 02/24/2022   • Abnormal findings on diagnostic imaging of abdomen 02/24/2022   • Status post cholecystectomy 02/24/2022   • Hyponatremia 02/24/2022   • Acute metabolic encephalopathy 04/27/2022   • Encephalopathy, toxic 04/28/2022   • Acute CVA (cerebrovascular accident) 05/01/2022   • History of intracranial hemorrhage 05/01/2022   • Stroke 05/13/2022   • Abnormal urinalysis 06/24/2022   • Diabetic muscle infarction 07/01/2022   • Other insomnia 07/01/2022   • Altered mental status 07/27/2022   • History of stroke- R MCA s/p TPA with subsequent ICH with debility 07/27/2022   • Heart murmur 07/27/2022   • Bradycardia 07/27/2022   • Left lower lobe pneumonia 07/27/2022   • Metabolic encephalopathy 07/29/2022   •  Pericardial effusion 08/20/2022   • Pleural effusion 08/20/2022   • Acute pulmonary edema 09/03/2022   • Atrial flutter 09/04/2022   • Chronic systolic CHF (congestive heart failure) 09/04/2022   • Thrombus of venous dialysis catheter 09/04/2022   • Sepsis without acute organ dysfunction 09/27/2022   • Type 2 diabetes mellitus with hyperglycemia, with long-term current use of insulin 10/06/2022   • Acute respiratory failure with hypoxia 10/06/2022   • Acute on chronic systolic CHF (congestive heart failure) 10/06/2022   • Hyperkalemia 10/12/2022   • Acute respiratory failure with hypoxia 10/27/2022   • Other hypervolemia 10/31/2022   • Hematoma of right hip, initial encounter 11/09/2022   • Ureteral stent present 11/09/2022   • Closed intertrochanteric fracture of right femur 11/09/2022   • Witnessed seizure-like activity 12/04/2022   • Acute respiratory failure with hypercapnia 12/26/2022   • Opioid use 12/26/2022   • Diabetic muscle infarction 12/30/2022   • Acute posthemorrhagic anemia 12/31/2022   • Kidney hematoma 12/31/2022   • Pyuria 02/01/2023   • Severe malnutrition 02/02/2023   • Moderate malnutrition 03/08/2023   • Hypophosphatemia 03/11/2023   • Urinary tract infection without hematuria, site unspecified 03/20/2023     Resolved Ambulatory Problems     Diagnosis Date Noted   • Hypertensive urgency 11/30/2021   • Leukocytosis 02/24/2022   • Hypoglycemia due to type 2 diabetes mellitus 03/04/2023     Past Medical History:   Diagnosis Date   • Acute on chronic diastolic CHF (congestive heart failure)    • Diabetes    • Disease of thyroid gland    • GERD (gastroesophageal reflux disease)    • Hypertension    • Stage 5 chronic kidney disease          REVIEW OF SYSTEMS  All systems reviewed and negative except for those discussed in HPI.       PAST SURGICAL HISTORY  Past Surgical History:   Procedure Laterality Date   • CHOLECYSTECTOMY WITH INTRAOPERATIVE CHOLANGIOGRAM N/A 1/10/2022    Procedure: Laparoscopic  cholecystectomy with intraoperative cholangiogram;  Surgeon: Ramana Raygoza MD;  Location: [facility] OR;  Service: General;  Laterality: N/A;   • CYSTOSCOPY W/ URETERAL STENT PLACEMENT Left 9/29/2022    Procedure: CYSTOSCOPY URETERAL STENT INSERTION WITH RETROGRADE PYELOGRAM;  Surgeon: Bryant Phan Jr., MD;  Location: [facility] OR;  Service: Urology;  Laterality: Left;   • CYSTOSCOPY W/ URETERAL STENT PLACEMENT Left 1/10/2023    Procedure: CYSTOSCOPY LEFT STENT REMOVAL;  Surgeon: Bryant Phan Jr., MD;  Location: [facility] OR;  Service: Urology;  Laterality: Left;   • EMBOLIZATION MESENTERIC ARTERY N/A 1/1/2023    Procedure: LEFT KIDNEY EMBOLIZATION;  Surgeon: Bijan Ordoñez MD;  Location: [facility] OR 18/19;  Service: Interventional Radiology;  Laterality: N/A;   • EYE SURGERY     • FEMUR IM NAILING/RODDING Right 11/10/2022    Procedure: FEMUR INTRAMEDULLARY NAILING/RODDING;  Surgeon: Glen Pierce MD;  Location: [facility] OR;  Service: Orthopedics;  Laterality: Right;   • HIP INTERTROCHANTERIC NAILING Right 11/10/2022    Procedure: HIP INTERTROCHANTERIC NAILING;  Surgeon: Glen Pierce MD;  Location: [facility] OR;  Service: Orthopedics;  Laterality: Right;   • HYSTERECTOMY     • INSERT CENTRAL LINE AT BEDSIDE  12/31/2022        • INSERTION HEMODIALYSIS CATHETER N/A 12/6/2021    Procedure: HEMODIALYSIS CATHETER INSERTION;  Surgeon: Keli Salazar MD;  Location: [facility] OR 18/19;  Service: Vascular;  Laterality: N/A;   • INSERTION HEMODIALYSIS CATHETER N/A 5/3/2022    Procedure: TUNNELED CATHETER PLACEMENT;  Surgeon: Keli Salazar MD;  Location: [facility] OR;  Service: Vascular;  Laterality: N/A;   • MUSCLE BIOPSY Left 6/15/2022    Procedure: Left quadriceps muscle biopsy;  Surgeon: Marques Ma MD;  Location: [facility];  Service: Neurosurgery;  Laterality: Left;   • URETEROSCOPY LASER LITHOTRIPSY WITH STENT INSERTION Left  12/30/2022    Procedure: CYSTOSCOPY WITH LEFT  URETEROSCOPY  LEFT STENT EXCHANGE;  Surgeon: Bryant Phan Jr., MD;  Location: [facility] OR;  Service: Urology;  Laterality: Left;         FAMILY HISTORY  Family History   Problem Relation Age of Onset   • Malig Hyperthermia Neg Hx          SOCIAL HISTORY  Social History     Socioeconomic History   • Marital status:      Spouse name: Marv   Tobacco Use   • Smoking status: Never   • Smokeless tobacco: Never   Vaping Use   • Vaping Use: Never used   Substance and Sexual Activity   • Alcohol use: Never   • Drug use: Never   • Sexual activity: Defer         ALLERGIES  Contrast dye (echo or unknown ct/mr), Iodinated contrast media, Ibuprofen, and Prochlorperazine      PHYSICAL EXAM  ED Triage Vitals   Temp Pulse Resp BP SpO2   -- -- -- -- --      Temp src Heart Rate Source Patient Position BP Location FiO2 (%)   -- -- -- -- --       Physical Exam      GENERAL: 57-year-old female who is chronically appearing and in mild respiratory distress  HENT: NCAT: nares patent: Neck supple  EYES: no scleral icterus: Pale conjunctiva: Pupils are 4 and sluggish bilaterally but move in all directions with no evidence of seizure-like activity  CV: regular rhythm, normal rate  RESPIRATORY: Patient has sonorous respirations and coarse airway sounds bilaterally  ABDOMEN: soft, NTND: Bowel sounds positive  MUSCULOSKELETAL: no deformity  NEURO: Lethargic but arousable to painful stimuli and responds to pain in all 4 extremities equally.      Vital signs and nursing notes reviewed.      LAB RESULTS  Recent Results (from the past 24 hour(s))   POC Glucose Once    Collection Time: 05/03/23 11:41 AM    Specimen: Blood   Result Value Ref Range    Glucose 82 70 - 130 mg/dL   ECG 12 Lead Altered Mental Status    Collection Time: 05/03/23 11:53 AM   Result Value Ref Range    QT Interval 424 ms   COVID-19,[facility] IN-HOUSE CEPHEID/MARTY NP SWAB IN TRANSPORT MEDIA 8-12 HR TAT - Swab,  Nasopharynx    Collection Time: 05/03/23 11:56 AM    Specimen: Nasopharynx; Swab   Result Value Ref Range    COVID19 Not Detected Not Detected - Ref. Range   Comprehensive Metabolic Panel    Collection Time: 05/03/23 12:05 PM    Specimen: Blood   Result Value Ref Range    Glucose 85 65 - 99 mg/dL    BUN 22 (H) 6 - 20 mg/dL    Creatinine 2.07 (H) 0.57 - 1.00 mg/dL    Sodium 132 (L) 136 - 145 mmol/L    Potassium 6.0 (H) 3.5 - 5.2 mmol/L    Chloride 97 (L) 98 - 107 mmol/L    CO2 30.0 (H) 22.0 - 29.0 mmol/L    Calcium 8.4 (L) 8.6 - 10.5 mg/dL    Total Protein 6.8 6.0 - 8.5 g/dL    Albumin 3.0 (L) 3.5 - 5.2 g/dL    ALT (SGPT) 8 1 - 33 U/L    AST (SGOT) 28 1 - 32 U/L    Alkaline Phosphatase 133 (H) 39 - 117 U/L    Total Bilirubin 0.5 0.0 - 1.2 mg/dL    Globulin 3.8 gm/dL    A/G Ratio 0.8 g/dL    BUN/Creatinine Ratio 10.6 7.0 - 25.0    Anion Gap 5.0 5.0 - 15.0 mmol/L    eGFR 27.5 (L) >60.0 mL/min/1.73   Protime-INR    Collection Time: 05/03/23 12:05 PM    Specimen: Blood   Result Value Ref Range    Protime 12.7 11.7 - 14.2 Seconds    INR 0.94 0.90 - 1.10   High Sensitivity Troponin T    Collection Time: 05/03/23 12:05 PM    Specimen: Blood   Result Value Ref Range    HS Troponin T 244 (C) <10 ng/L   Ethanol    Collection Time: 05/03/23 12:05 PM    Specimen: Blood   Result Value Ref Range    Ethanol <10 0 - 10 mg/dL    Ethanol % <0.010 %   CK    Collection Time: 05/03/23 12:05 PM    Specimen: Blood   Result Value Ref Range    Creatine Kinase 44 20 - 180 U/L   Magnesium    Collection Time: 05/03/23 12:05 PM    Specimen: Blood   Result Value Ref Range    Magnesium 1.7 1.6 - 2.6 mg/dL   TSH    Collection Time: 05/03/23 12:05 PM    Specimen: Blood   Result Value Ref Range    TSH 32.800 (H) 0.270 - 4.200 uIU/mL   T4, Free    Collection Time: 05/03/23 12:05 PM    Specimen: Blood   Result Value Ref Range    Free T4 1.48 0.93 - 1.70 ng/dL   CBC Auto Differential    Collection Time: 05/03/23 12:05 PM    Specimen: Blood   Result  Value Ref Range    WBC 7.71 3.40 - 10.80 10*3/mm3    RBC 3.66 (L) 3.77 - 5.28 10*6/mm3    Hemoglobin 10.0 (L) 12.0 - 15.9 g/dL    Hematocrit 30.8 (L) 34.0 - 46.6 %    MCV 84.2 79.0 - 97.0 fL    MCH 27.3 26.6 - 33.0 pg    MCHC 32.5 31.5 - 35.7 g/dL    RDW 17.2 (H) 12.3 - 15.4 %    RDW-SD 52.8 37.0 - 54.0 fl    MPV 10.0 6.0 - 12.0 fL    Platelets 104 (L) 140 - 450 10*3/mm3    Neutrophil % 71.4 42.7 - 76.0 %    Lymphocyte % 18.9 (L) 19.6 - 45.3 %    Monocyte % 8.0 5.0 - 12.0 %    Eosinophil % 0.9 0.3 - 6.2 %    Basophil % 0.4 0.0 - 1.5 %    Neutrophils, Absolute 5.50 1.70 - 7.00 10*3/mm3    Lymphocytes, Absolute 1.46 0.70 - 3.10 10*3/mm3    Monocytes, Absolute 0.62 0.10 - 0.90 10*3/mm3    Eosinophils, Absolute 0.07 0.00 - 0.40 10*3/mm3    Basophils, Absolute 0.03 0.00 - 0.20 10*3/mm3   Urinalysis With Culture If Indicated - Straight Cath    Collection Time: 05/03/23 12:12 PM    Specimen: Straight Cath; Urine   Result Value Ref Range    Color, UA Yellow Yellow, Straw    Appearance, UA Cloudy (A) Clear    pH, UA >=9.0 (H) 5.0 - 8.0    Specific Gravity, UA 1.013 1.005 - 1.030    Glucose, UA Negative Negative    Ketones, UA Negative Negative    Bilirubin, UA Negative Negative    Blood, UA Moderate (2+) (A) Negative    Protein, UA >=300 mg/dL (3+) (A) Negative    Leuk Esterase, UA Large (3+) (A) Negative    Nitrite, UA Negative Negative    Urobilinogen, UA 0.2 E.U./dL 0.2 - 1.0 E.U./dL   Urinalysis, Microscopic Only - Straight Cath    Collection Time: 05/03/23 12:12 PM    Specimen: Straight Cath; Urine   Result Value Ref Range    RBC, UA 13-20 (A) None Seen, 0-2 /HPF    WBC, UA Too Numerous to Count (A) None Seen, 0-2 /HPF    Bacteria, UA 2+ (A) None Seen /HPF    Squamous Epithelial Cells, UA 0-2 None Seen, 0-2 /HPF    Hyaline Casts, UA None Seen None Seen /LPF    WBC Clumps, UA Small/1+ None Seen /HPF    Methodology Manual Light Microscopy    Urine Drug Screen - Straight Cath    Collection Time: 05/03/23 12:13 PM     Specimen: Straight Cath; Urine   Result Value Ref Range    Amphet/Methamphet, Screen Negative Negative    Barbiturates Screen, Urine Negative Negative    Benzodiazepine Screen, Urine Negative Negative    Cocaine Screen, Urine Negative Negative    Opiate Screen Negative Negative    THC, Screen, Urine Positive (A) Negative    Methadone Screen, Urine Negative Negative    Oxycodone Screen, Urine Negative Negative    Fentanyl, Urine Negative Negative   POC Glucose Once    Collection Time: 05/03/23  3:15 PM    Specimen: Blood   Result Value Ref Range    Glucose 120 70 - 130 mg/dL   High Sensitivity Troponin T 2Hr    Collection Time: 05/03/23  3:27 PM    Specimen: Blood   Result Value Ref Range    HS Troponin T 187 (C) <10 ng/L    Troponin T Delta -57 (L) >=-4 - <+4 ng/L   Basic Metabolic Panel    Collection Time: 05/03/23  3:27 PM    Specimen: Blood   Result Value Ref Range    Glucose 120 (H) 65 - 99 mg/dL    BUN 19 6 - 20 mg/dL    Creatinine 1.80 (H) 0.57 - 1.00 mg/dL    Sodium 137 136 - 145 mmol/L    Potassium 5.5 (H) 3.5 - 5.2 mmol/L    Chloride 105 98 - 107 mmol/L    CO2 25.3 22.0 - 29.0 mmol/L    Calcium 7.3 (L) 8.6 - 10.5 mg/dL    BUN/Creatinine Ratio 10.6 7.0 - 25.0    Anion Gap 6.7 5.0 - 15.0 mmol/L    eGFR 32.5 (L) >60.0 mL/min/1.73   BNP    Collection Time: 05/03/23  3:27 PM    Specimen: Blood   Result Value Ref Range    proBNP >70,000.0 (H) 0.0 - 900.0 pg/mL   Blood Gas, Arterial -    Collection Time: 05/03/23  4:08 PM    Specimen: Arterial Blood   Result Value Ref Range    Site Arterial: left radial     Omar's Test Positive     pH, Arterial 7.459 (H) 7.350 - 7.450 pH units    pCO2, Arterial 44.6 35.0 - 45.0 mm Hg    pO2, Arterial 77.5 (L) 80.0 - 100.0 mm Hg    HCO3, Arterial 31.7 (H) 22.0 - 28.0 mmol/L    Base Excess, Arterial 6.9 (H) 0.0 - 2.0 mmol/L    O2 Saturation Calculated 95.9 92.0 - 99.0 %    A-a DO2 0.8 mmHg    Barometric Pressure for Blood Gas 745.2 mmHg    Modality Room Air     FIO2 21 %    Rate 24  Breaths/minute   POC Glucose Once    Collection Time: 05/03/23  5:13 PM    Specimen: Blood   Result Value Ref Range    Glucose 122 70 - 130 mg/dL       Ordered the above labs and reviewed the results.        RADIOLOGY  CT Head Without Contrast    Result Date: 5/3/2023  EMERGENCY NONCONTRAST HEAD CT ON 05/03/2023  CLINICAL HISTORY: Seizure, confusion.  TECHNIQUE: Spiral CT images were obtained from the base of skull to the vertex without intravenous contrast and the images were reformatted and are submitted in 3 mm thick axial, sagittal and coronal CT sections with brain algorithm.  COMPARISON: This is correlated to 12 prior head CTs dating back to 02/23/2022 and head CT following left frontal lobe intraparenchymal hemorrhage on 04/29/2022, 3 more recent head CTs on 12/04/2022 and 12/26/2022 and 01/13/2023, as well as prior MRIs of the brain on 04/28/2022 and 04/30/2022.  FINDINGS: The patient was moving during this exam and the exam is slightly compromised by motion artifact which causes blurring of many of the images and slightly limits evaluation. Back on 04/29/2022 the patient had a 2 x 1.3 x 2 cm acute intraparenchymal hemorrhage in the superior left frontal lobe. On the current head CT there is 7 x 2 mm area of increased density at the site of prior hemorrhage that is felt to be some hemosiderin deposition related to the prior hemorrhage. There is some minimal surrounding low-density felt to be surrounding gliosis. There is patchy low-density in the periventricular extending to subcortical white matter of cerebral hemispheres consistent with mild-to-moderate small vessel disease. The remainder of the brain parenchyma is normal in attenuation. The ventricles are normal in size. I see no mass effect and no midline shift and no extra-axial fluid collections are identified and there is no evidence of acute intracranial hemorrhage. Patient has a nasopharyngeal tube extending from the right nasal cavity into the  pharynx and the distal aspect of the tube is not assessed on this exam. Paranasal sinuses and mastoid air cells and middle ear cavities are clear. The calvarium and skull base are normal in appearance.      1. This patient was moving quite a bit during the exam and motion artifact causes blurring of many of the images and slightly limits evaluation. 2. Overall, no acute intracranial abnormality is identified with no significant change when compared to prior noncontrast head CT from [facility] on 03/20/2023. 3. There is a 7 x 2 mm area of hyperdensity in the superior left frontal lobe compatible with an area of stable chronic hemosiderin deposition where the patient had a remote prior hemorrhage at this location back on 04/29/2022. There is mild-to-moderate small vessel disease in cerebral white matter and mild cerebral atrophy. 4. Patient has a nasopharyngeal tube with the tube extending through the right nasal cavity into the nasopharynx and the inferior aspect of the tube is not assessed on this exam. The remainder of the head CT is normal.  Radiation dose reduction techniques were utilized, including automated exposure control and exposure modulation based on body size.  This report was finalized on 5/3/2023 3:28 PM by Dr. Suhas Sanches M.D.      XR Chest 1 View    Result Date: 5/3/2023  PORTABLE CHEST X-RAY  HISTORY: Shortness of breath.  Portable chest x-ray is provided. Correlation: 03/20/2023.  FINDINGS: There is a dual lumen right IJ dialysis catheter with its tip in the right atrium and surgical clips from prior cholecystectomy. A moderate volume left pleural effusion is again observed and there are some air bronchograms in the left perihilar region which may be due to atelectasis or pneumonia in the left lung. Right lung remains clear and free of volume overload, edema, or pneumonia. No pleural effusion on the right.      No change in the abnormal appearance of the left hemithorax.  This  report was finalized on 5/3/2023 12:40 PM by Dr. Adonis Burch M.D.        Ordered the above noted radiological studies. Reviewed by me in PACS.            PROCEDURES  Critical Care  Performed by: Aguila Canseco MD  Authorized by: Aguila Canseco MD     Critical care provider statement:     Critical care time (minutes):  50    Critical care time was exclusive of:  Separately billable procedures and treating other patients    Critical care was necessary to treat or prevent imminent or life-threatening deterioration of the following conditions:  CNS failure or compromise, metabolic crisis and renal failure    Critical care was time spent personally by me on the following activities:  Discussions with consultants, discussions with primary provider, evaluation of patient's response to treatment, examination of patient, obtaining history from patient or surrogate, ordering and performing treatments and interventions, ordering and review of laboratory studies, ordering and review of radiographic studies, pulse oximetry, re-evaluation of patient's condition and review of old charts    I assumed direction of critical care for this patient from another provider in my specialty: no      Care discussed with: admitting provider            EKG    EKG time: 1153  Rhythm/Rate: Normal sinus rhythm at 72  No Acute Ischemia  Non-Specific ST-T changes    Similar compared to prior on 3/20/2023    Interpreted Contemporaneously by me.  Independently viewed by me      MEDICATIONS GIVEN IN ER  Medications   sodium chloride 0.9 % flush 10 mL (has no administration in time range)   acetaminophen (TYLENOL) tablet 650 mg (has no administration in time range)   amLODIPine (NORVASC) tablet 10 mg (10 mg Oral Not Given 5/3/23 1743)   aspirin EC tablet 81 mg (has no administration in time range)   carvedilol (COREG) tablet 3.125 mg (3.125 mg Oral Not Given 5/3/23 1744)   Desvenlafaxine Succinate ER 25 mg (has no administration in time range)    folic acid (FOLVITE) tablet 1 mg (1 mg Oral Not Given 5/3/23 1744)   melatonin tablet 3 mg (has no administration in time range)   pantoprazole (PROTONIX) EC tablet 40 mg (40 mg Oral Not Given 5/3/23 1743)   rOPINIRole (REQUIP) tablet 2 mg (has no administration in time range)   sennosides-docusate (PERICOLACE) 8.6-50 MG per tablet 2 tablet (has no administration in time range)   albuterol (PROVENTIL) nebulizer solution 0.5% 2.5 mg/0.5mL (has no administration in time range)   hydrALAZINE (APRESOLINE) tablet 100 mg (100 mg Oral Not Given 5/3/23 1744)   hydrALAZINE (APRESOLINE) injection 10 mg (has no administration in time range)   levETIRAcetam (KEPPRA) injection 500 mg (has no administration in time range)   cefTRIAXone (ROCEPHIN) 1 g in sodium chloride 0.9 % 100 mL IVPB-VTB (has no administration in time range)   levothyroxine (SYNTHROID, LEVOTHROID) tablet 350 mcg (has no administration in time range)   levETIRAcetam (KEPPRA) injection 1,000 mg (1,000 mg Intravenous Given 5/3/23 1206)   labetalol (NORMODYNE,TRANDATE) injection 20 mg (20 mg Intravenous Given 5/3/23 1339)   dextrose (D50W) (25 g/50 mL) IV injection 50 mL (50 mL Intravenous Given 5/3/23 1341)   insulin regular (humuLIN R,novoLIN R) injection 5 Units (5 Units Intravenous Given 5/3/23 1341)   sodium bicarbonate injection 8.4% 50 mEq (50 mEq Intravenous Given 5/3/23 1348)   calcium gluconate 1g/50ml 0.675% NaCl IV SOLN (0 g Intravenous Stopped 5/3/23 1611)   albuterol (PROVENTIL) nebulizer solution 0.5% 2.5 mg/0.5mL (10 mg Nebulization Given 5/3/23 1354)             MEDICAL DECISION MAKING, PROGRESS, and CONSULTS    All labs have been independently reviewed by me.  All radiology studies have been reviewed by me and I have also reviewed the radiology report.   EKG's independently viewed and interpreted by me.  Discussion below represents my analysis of pertinent findings related to patient's condition, differential diagnosis, treatment plan and final  disposition.      Additional sources:  - Discussed/ obtained information from independent historians: When the patient's  arrived confirmed that the patient is a DO NOT RESUSCITATE.  He states she is in palliative care but not hospice.  He does not want us to do any extraordinary measures but would like to treat her.    - External (non-ED) record review: The patient was admitted to hospital from March 20 through March 28 of this year for pneumonia, UTI, metabolic encephalopathy, end-stage renal disease on hemodialysis, right MCA stroke, chronic anemia and seizure disorder.  It states the patient is a DO NOT RESUSCITATE and they were discussions about palliative care however they wanted to continue dialysis at this time.  - Chronic or social conditions impacting care: Patient lives at home with her .    - Shared decision making: After shared decision-making discussion between myself, the patient, her , the hospitalist and the nephrologist we agree the patient needs to admitted to a telemetry bed for further evaluation and care.      Orders placed during this visit:  Orders Placed This Encounter   Procedures   • Critical Care   • COVID PRE-OP / PRE-PROCEDURE SCREENING ORDER (NO ISOLATION) - Swab, Nasopharynx   • COVID-19,[facility] NAZ IN-HOUSE CEPHEID/MARTY NP SWAB IN TRANSPORT MEDIA 8-12 HR TAT - Swab, Nasopharynx   • Urine Culture - Urine,   • Blood Culture - Blood,   • Blood Culture - Blood,   • XR Chest 1 View   • CT Head Without Contrast   • Comprehensive Metabolic Panel   • Protime-INR   • Urinalysis With Culture If Indicated - Urine, Catheter   • High Sensitivity Troponin T   • Ethanol   • Urine Drug Screen - Urine, Clean Catch   • CK   • Magnesium   • TSH   • T4, Free   • CBC Auto Differential   • High Sensitivity Troponin T 2Hr   • Basic Metabolic Panel   • Blood Gas, Arterial -   • Urinalysis, Microscopic Only - Urine, Clean Catch   • Blood Gas, Arterial -   • Comprehensive Metabolic Panel   •  BNP   • TSH   • Lipid Panel   • Hemoglobin A1c   • Lactic Acid, Plasma   • Manual Blood Pressure - Notify provider of result   • Orthostatic Vitals   • Cardiac Monitoring   • Pulse Oximetry, Continuous   • Place Sequential Compression Device   • Maintain Sequential Compression Device   • Advance Diet As Tolerated -   • Neuro Checks   • Code Status and Medical Interventions:   • Nephrology (on -call MD unless specified)   • Inpatient Palliative Care Team Consult   • Inpatient Neurology Consult General   • POC Glucose Once   • POC Glucose Once   • POC Glucose Once   • POC Glucose Once   • ECG 12 Lead Altered Mental Status   • Insert Peripheral IV   • Inpatient Admission   • Seizure Precautions   • CBC & Differential   • CBC & Differential         Differential diagnosis:  My differential diagnosis includes but is not limited to stroke, encephalopathy, toxic / metabolic, hypoglycemia, toxin-induced, psychogenic, medication-induced, or infectious.      Independent interpretation of labs, radiology studies, and discussions with consultants:  ED Course as of 05/03/23 1751   Wed May 03, 2023   1308 The patient has a history of end-stage renal disease and is on hemodialysis.  The patient's potassium is 6 and her blood pressure is markedly elevated.  I will order the hyperkalemia protocol and labetalol for her blood pressure. [GP]   9417 We will given the patient's hyperkalemia protocol but her IV failed.  We have called IV therapy.  I just discussed the patient's head CT with Dr. Sanches from radiology who states it has chronic changes but is negative acute. [GP]   3432 On repeat examination the patient is still somnolent and postictal with sonorous respirations.  Her oxygen saturations are 100% on room air.  The patient's  is here and states that she has a DO NOT RESUSCITATE and is in palliative care but not hospice.  He states the patient still wants to have her dialysis done.  The patient is no longer seizing and  looking around the room and moving purposefully but still not following commands.  I will check an ABG and glucose and admit the patient to the hospital for further evaluation and care. [GP]   1515 I discussed the case with Dr. Pena at length.  He is aware of the patient's seizures, end-stage renal disease and hyperkalemia.  He has asked me to consult nephrology and he will admit to a telemetry bed. [GP]   1516 The patient's blood sugar is 120. [GP]   4268  I discussed the case with Dr. Laneg from nephrology.  He has advised me to put a 0.1 clonidine patch on the patient for her blood pressure.  He is aware of the patient's hyperkalemia and that she has been treated and we are currently awaiting her repeat BMP.  He is aware of the patient's altered mental status with postictal state status postseizure and thus feels the patient likely would benefit from dialysis but wants to further evaluate her at this time. [GP]   1681 Patient's repeat potassium is 5.5 and her glucose is 120.  Her repeat troponin is improved.  On repeat examination advised the patient's  of my discussion with Dr. Pena and Dr. Lange.  I advised the patient has a UTI as well.  I again advised him that the patient is critically ill and that we will treat her but realized that she is a DO NOT RESUSCITATE and would not perform aggressive measures.  He understands and agrees with the plan. [GP]   1404 Dr. Pena and Dr. Lange have been to the emergency room to see the patient and assume care.  The patient has not had any seizure-like activity since being in the emergency room. [GP]      ED Course User Index  [GP] Aguila Canseco MD               DIAGNOSIS  Final diagnoses:   Seizure   Postictal state   End-stage renal disease on hemodialysis   Hyperkalemia   Hypertensive urgency   Elevated troponin   Elevated TSH   Do not resuscitate   Acute UTI         DISPOSITION  ADMISSION    Discussed treatment plan and reason for admission with  pt/family and admitting physician.  Pt/family voiced understanding of the plan for admission for further testing/treatment as needed.            Latest Documented Vital Signs:  As of 17:51 EDT  BP- 156/96 HR- 90 Temp- 97.8 °F (36.6 °C) (Oral) O2 sat- 99%--      --------------------  Please note that portions of this were completed with a voice recognition program.       Note Disclaimer: At [facility], we believe that sharing information builds trust and better relationships. You are receiving this note because you are receiving care at [facility] or recently visited. It is possible you will see health information before a provider has talked with you about it. This kind of information can be easy to misunderstand. To help you fully understand what it means for your health, we urge you to discuss this note with your provider.           Aguila Canseco MD  05/03/23 3229

## 2023-05-03 NOTE — CONSULTS
"  [facility] Consult Note      Patient Name: Zaina Martinez  : 1965  MRN: [id number]  Primary Care Physician:  Norman Serrato MD  Referring Physician: No ref. provider found  Date of admission: 5/3/2023    Subjective     Reason for Consult:  ESRD    HPI:   Zaina Martinez is a 57 y.o. female with ESRD on HD (TTS; right chest catheter; [facility] [place]; Dr. Vazquez Villalpando of our group) admitted today for further evaluation decreased mental status.  EMS had been contacted by  after he had noted progressive decline over the previous 2 days; reportedly multiple seizures en route to the hospital.  Potassium 6.0 despite HD yesterday.  UA with pyuria and bacteriuria, concerning for UTI.  Full PMH outlined below; DM2 with multiple end-organ complications; PVD with prior stroke; chronic CHF; seizure disorder; severe hypothyroidism; questionable medication compliance; and four hospitalizations already this year.    ·  reports recent use of cannabis-products (\"Gummies\") to enhance her appetite (which has worked, he states)  · No fever or chills  · Diarrhea alternating with constipation; still makes urine.  At times defecates and urinates on herself  · No recent complaints of chest pain or shortness of breath  · Family feels that she has been taking medications, such as thyroid medication, reliably (as  administers these medications)    Review of Systems:   14 point review of systems is otherwise negative except for mentioned above on HPI    Personal History     Past Medical History:   Diagnosis Date   • Acute CVA (cerebrovascular accident) 2022   • Acute on chronic diastolic CHF (congestive heart failure)    • Anemia    • CAD (coronary artery disease) 2021   • Diabetes    • Disease of thyroid gland    • GERD (gastroesophageal reflux disease)    • History of intracranial hemorrhage 2022   • Hyperlipidemia 2018   • Hypertension    • Rheumatoid arthritis    • Stage " 5 chronic kidney disease        Past Surgical History:   Procedure Laterality Date   • CHOLECYSTECTOMY WITH INTRAOPERATIVE CHOLANGIOGRAM N/A 1/10/2022    Procedure: Laparoscopic cholecystectomy with intraoperative cholangiogram;  Surgeon: Ramana Raygoza MD;  Location: [facility] OR;  Service: General;  Laterality: N/A;   • CYSTOSCOPY W/ URETERAL STENT PLACEMENT Left 9/29/2022    Procedure: CYSTOSCOPY URETERAL STENT INSERTION WITH RETROGRADE PYELOGRAM;  Surgeon: Bryant Phan Jr., MD;  Location: [facility] OR;  Service: Urology;  Laterality: Left;   • CYSTOSCOPY W/ URETERAL STENT PLACEMENT Left 1/10/2023    Procedure: CYSTOSCOPY LEFT STENT REMOVAL;  Surgeon: Bryant Phan Jr., MD;  Location: [facility] OR;  Service: Urology;  Laterality: Left;   • EMBOLIZATION MESENTERIC ARTERY N/A 1/1/2023    Procedure: LEFT KIDNEY EMBOLIZATION;  Surgeon: Bijan Ordoñez MD;  Location: [facility] OR 18/19;  Service: Interventional Radiology;  Laterality: N/A;   • EYE SURGERY     • FEMUR IM NAILING/RODDING Right 11/10/2022    Procedure: FEMUR INTRAMEDULLARY NAILING/RODDING;  Surgeon: Glen Pierce MD;  Location: [facility] OR;  Service: Orthopedics;  Laterality: Right;   • HIP INTERTROCHANTERIC NAILING Right 11/10/2022    Procedure: HIP INTERTROCHANTERIC NAILING;  Surgeon: Glen Pierce MD;  Location: [facility] OR;  Service: Orthopedics;  Laterality: Right;   • HYSTERECTOMY     • INSERT CENTRAL LINE AT BEDSIDE  12/31/2022        • INSERTION HEMODIALYSIS CATHETER N/A 12/6/2021    Procedure: HEMODIALYSIS CATHETER INSERTION;  Surgeon: Keli Salazar MD;  Location: [facility] OR 18/19;  Service: Vascular;  Laterality: N/A;   • INSERTION HEMODIALYSIS CATHETER N/A 5/3/2022    Procedure: TUNNELED CATHETER PLACEMENT;  Surgeon: Keli Salazar MD;  Location: [facility] MAIN OR;  Service: Vascular;  Laterality: N/A;   • MUSCLE BIOPSY Left 6/15/2022    Procedure: Left quadriceps  muscle biopsy;  Surgeon: Marques Ma MD;  Location: [facility];  Service: Neurosurgery;  Laterality: Left;   • URETEROSCOPY LASER LITHOTRIPSY WITH STENT INSERTION Left 12/30/2022    Procedure: CYSTOSCOPY WITH LEFT  URETEROSCOPY  LEFT STENT EXCHANGE;  Surgeon: Bryant Phan Jr., MD;  Location: [facility] OR;  Service: Urology;  Laterality: Left;       Family History: family history is not on file.    Social History:  reports that she has never smoked. She has never used smokeless tobacco. She reports that she does not drink alcohol and does not use drugs.    Home Medications:  Prior to Admission medications    Medication Sig Start Date End Date Taking? Authorizing Provider   acetaminophen (TYLENOL) 325 MG tablet Take 2 tablets by mouth Every 6 (Six) Hours As Needed for Mild Pain, Fever or Headache. 2/11/23   Mat Marte MD   amLODIPine (NORVASC) 10 MG tablet Take 1 tablet by mouth Daily. 12/9/22   Andria Silva APRN   aspirin 81 MG EC tablet Take 1 tablet by mouth Daily. 3/11/23   Albert Templeton MD   carvedilol (COREG) 3.125 MG tablet Take 1 tablet by mouth 2 (Two) Times a Day With Meals. 1/29/23   Mat Marte MD   Desvenlafaxine Succinate ER 25 MG tablet sustained-release 24 hour Take 1 tablet by mouth Daily for 30 days. 3/29/23 4/28/23  Dimitri Pena MD   folic acid (FOLVITE) 1 MG tablet Take 1 tablet by mouth Daily.    Provider, MD Doyle   hydrALAZINE (APRESOLINE) 50 MG tablet Take 1 tablet by mouth 2 (Two) Times a Day. 3/11/23   Albert Templeton MD   levETIRAcetam (Keppra) 250 MG tablet Take 1 tablet by mouth 2 (Two) Times a Day. 1/29/23   Mat Marte MD   levothyroxine (Synthroid) 150 MCG tablet Take 2 tablets by mouth Daily. 3/28/23   Dimitri Pena MD   melatonin 3 MG tablet Take 1 tablet by mouth At Night As Needed for Sleep. 2/11/23   Mat Marte MD   Methoxy PEG-Epoetin Beta (MIRCERA IJ) 100 mcg Every 14 (Fourteen) Days. 12/21/22 12/20/23   Provider, MD Doyle   pantoprazole (PROTONIX) 40 MG EC tablet Take 1 tablet by mouth Daily. 7/1/22   Adonis Florentino MD   rOPINIRole (REQUIP) 2 MG tablet Take 1 tablet by mouth Every Night. 2/11/23   Mat Marte MD   sennosides-docusate (PERICOLACE) 8.6-50 MG per tablet Take 2 tablets by mouth At Night As Needed for Constipation. 2/11/23   Mat Marte MD   vitamin D (ERGOCALCIFEROL) 1.25 MG (71956 UT) capsule capsule Take 1 capsule by mouth Every 7 (Seven) Days. 3/17/23   Albert Templeton MD       Allergies:  Allergies   Allergen Reactions   • Contrast Dye (Echo Or Unknown Ct/Mr) Confusion   • Iodinated Contrast Media Unknown - High Severity   • Ibuprofen Other (See Comments)     Kidney disease   • Prochlorperazine Other (See Comments)     Dystonic reaction            Objective     Vitals:   Temp:  [97 °F (36.1 °C)] 97 °F (36.1 °C)  Heart Rate:  [66-87] 87  Resp:  [22-24] 22  BP: (157-200)/() 168/121    Intake/Output Summary (Last 24 hours) at 5/3/2023 1719  Last data filed at 5/3/2023 1611  Gross per 24 hour   Intake 50 ml   Output --   Net 50 ml       Physical Exam:   Constitutional: Eyes open; postictal, confused; chr ill; mumbling  HEENT: Sclera anicteric, no conjunctival injection, tongue bleeding  Neck: Supple, no carotid bruit trachea at midline, no JVD  Respiratory: Coarse BS with gurgling; nonlabored on RA  Cardiovascular: RR, tachycardic  Gastrointestinal: BS +, soft, no grimacing, nondistended  : No palpable bladder; external urinary catheter  Musculoskeletal: No significant edema, no clubbing or cyanosis  Psychiatric: Awake but lethargic, mumbling, writhing in bed, pulling at catheter (postictal)  Neurologic:  moving all extremities, though not to command  Skin: Warm and dry       Scheduled Meds:     amLODIPine, 10 mg, Oral, Q24H  aspirin, 81 mg, Oral, Daily  carvedilol, 3.125 mg, Oral, BID With Meals  cefTRIAXone, 1 g, Intravenous, Q24H  Desvenlafaxine Succinate ER,  25 mg, Oral, Daily  folic acid, 1 mg, Oral, Daily  hydrALAZINE, 100 mg, Oral, Q8H  levETIRAcetam, 500 mg, Intravenous, Q12H  [START ON 5/4/2023] levothyroxine, 350 mcg, Oral, Daily  pantoprazole, 40 mg, Oral, Daily  rOPINIRole, 2 mg, Oral, Nightly      IV Meds:        Results Reviewed:   I have personally reviewed the results from the time of this admission to 5/3/2023 17:19 EDT     Lab Results   Component Value Date    GLUCOSE 120 (H) 05/03/2023    CALCIUM 7.3 (L) 05/03/2023     05/03/2023    K 5.5 (H) 05/03/2023    CO2 25.3 05/03/2023     05/03/2023    BUN 19 05/03/2023    CREATININE 1.80 (H) 05/03/2023    EGFRIFAFRI  01/13/2022      Comment:      <15 Indicative of kidney failure.    EGFRIFNONA 14 (L) 02/28/2022    BCR 10.6 05/03/2023    ANIONGAP 6.7 05/03/2023      Lab Results   Component Value Date    MG 1.7 05/03/2023    PHOS 2.8 03/28/2023    ALBUMIN 3.0 (L) 05/03/2023           Assessment / Plan     ASSESSMENT:  1.  ESRD with last HD yesterday; improving hyperkalemia; chronic left pleural effusion  2.  Decreased mental status, multifactorial  3.  Recurrent seizure  4.  DM2 with multiple end-organ complications  5.  Hypertension of ESRD  6.  Severe hypothyroidism; family reports compliance with levothyroxine  7.  Anemia of ESRD  8.  Malnutrition and anorexia: Has had increased appetite since  has been giving her cannabis products  9.  Suspected UTI; empiric antibiotics already on board  10.  Hypertension of ESRD, exacerbated by recent seizure and inability to take oral medications    PLAN:  1.  Re-check potassium this evening.  If potassium remains below 5.8, will dialyze tomorrow.  On the other hand, if potassium again rises will plan HD this evening  2.  Again broached the subject of palliative care with .  With each admission, she seems to lose more ground  3.  If unable to resume oral medication soon, will begin clonidine patch    Thank you for involving us in the care of Zaina ANTHONY  Juan.  Please feel free to call with any questions.    Alejo Lange MD  05/03/23  17:19 EDT    [facility]  [phone]      Please note that portions of this note were completed with a voice recognition program.   Statement Selected

## 2023-05-03 NOTE — ED NOTES
Unable to collect high sensitivity trop d/t loss of IV access. Pt is a hard stick. IV team page called for assist

## 2023-05-04 NOTE — CONSULTS
"Purpose of the visit was to evaluate for: support for patient/family, pain/symptom management, transfer to comfort care bed/unit and comfort care. Spoke with MD and RN as well as family and discussed palliative care, goals of care, care options and resuscitation status.      Assessment:  Patient is palliative care appropriate for inpatient care given (list diagnosis/symptoms):  History of coronary artery disease, end stage renal disease requiring HD, bedbound, diabetes mellitus, and seizures. Patient admitted with altered mental status and seizure activity. Upon assessment, patient is unresponsive with guppy breathing. No s/s of distress. PPS 10%      Recommendations/Plan: Transfer to palliative care unit for comfort measures. All treatment preferences geared toward symptom management. Utilize the lowest possible dose of medication for patient comfort if needed. Family ok with stopping EEG if it is causing patient to be uncomfortable.     Other Comments: Spoke with patient's spouse and daughter regarding goals of care. Both verbalized goal at this time is comfort and quality of life, stating \"she is not going to make it home this time\". Discussed palliative care unit, explained medications used if needed. Both in agreement with transferring to palliative care unit for end of life care.   "

## 2023-05-04 NOTE — PLAN OF CARE
Goal Outcome Evaluation:  Plan of Care Reviewed With: patient, spouse        Progress: declining  Outcome Evaluation: PPS 10%. Pt transferred to [facility] for palliative care. Pt resting comfortably. Family at bedside. No meds given upon arrival. 6L NC. Hemodialysis port in R chest. Pt unresponsive. No needs at this time.

## 2023-05-04 NOTE — PROGRESS NOTES
[facility] Progress Note      Patient Name: Zaina Martinez  : 1965  MRN: [id number]  Primary Care Physician:  Norman Serrato MD  Date of admission: 5/3/2023    Subjective     Interval History:   Seen and examined on HD  Remains very altered; nearly obtunded; guppy breathing  Low blood sugars earlier; treated; running fever  EEG to be done later today    Review of Systems:   As noted above    Objective     Vitals:   Temp:  [97 °F (36.1 °C)-102.1 °F (38.9 °C)] 99.4 °F (37.4 °C)  Heart Rate:  [] 109  Resp:  [18-24] 18  BP: (127-200)/() 168/91  Flow (L/min):  [2-6] 3    Intake/Output Summary (Last 24 hours) at 2023 1117  Last data filed at 5/3/2023 2306  Gross per 24 hour   Intake 150 ml   Output --   Net 150 ml       Physical Exam:    Constitutional:  Very lethargic; has received Ativan and fosphenytoin  HEENT: Sclera anicteric, no conjunctival injection, tongue bleeding  Neck: Supple, no carotid bruit trachea at midline, no JVD  Respiratory: Coarse BS with gurgling; nonlabored on 3 L/min  Cardiovascular: RR, tachycardic  Gastrointestinal: BS +, soft, no grimacing, nondistended  : No palpable bladder; external urinary catheter  Musculoskeletal: No significant edema, no clubbing or cyanosis  Psychiatric:  Very lethargic; guppy breathing  Neurologic:  moving arms though not purposefully  Skin: Warm and dry     Scheduled Meds:     amLODIPine, 10 mg, Oral, Q24H  aspirin, 81 mg, Oral, Daily  carvedilol, 3.125 mg, Oral, BID With Meals  cefTRIAXone, 1 g, Intravenous, Q24H  Desvenlafaxine Succinate ER, 25 mg, Oral, Daily  folic acid, 1 mg, Oral, Daily  fosphenytoin, 15 mg PE/kg, Intravenous, Once   And  fosphenytoin, 200 mg PE, Intravenous, Q8H  hydrALAZINE, 100 mg, Oral, Q8H  levETIRAcetam, 1,000 mg, Intravenous, Q6H  levothyroxine, 350 mcg, Oral, Q AM  LORazepam, 2 mg, Intravenous, Once  pantoprazole, 40 mg, Oral, Daily  rOPINIRole, 2 mg, Oral, Nightly  sodium phosphate  IVPB, 15 mmol, Intravenous, Once      IV Meds:        Results Reviewed:   I have personally reviewed the results from the time of this admission to 5/4/2023 11:17 EDT     Results from last 7 days   Lab Units 05/04/23  0543 05/03/23 2039 05/03/23  1527 05/03/23  1205   SODIUM mmol/L 135*  --  137 132*   POTASSIUM mmol/L 5.3* 6.0* 5.5* 6.0*   CHLORIDE mmol/L 98  --  105 97*   CO2 mmol/L 25.0  --  25.3 30.0*   BUN mg/dL 25*  --  19 22*   CREATININE mg/dL 2.48*  --  1.80* 2.07*   CALCIUM mg/dL 8.3*  --  7.3* 8.4*   BILIRUBIN mg/dL 0.4  --   --  0.5   ALK PHOS U/L 106  --   --  133*   ALT (SGPT) U/L 10  --   --  8   AST (SGOT) U/L 34*  --   --  28   GLUCOSE mg/dL 53*  --  120* 85       Estimated Creatinine Clearance: 21.6 mL/min (A) (by C-G formula based on SCr of 2.48 mg/dL (H)).    Results from last 7 days   Lab Units 05/04/23  0543 05/03/23  1205   MAGNESIUM mg/dL  --  1.7   PHOSPHORUS mg/dL 1.5*  --              Results from last 7 days   Lab Units 05/04/23  0543 05/03/23  1205   WBC 10*3/mm3 17.99* 7.71   HEMOGLOBIN g/dL 8.5* 10.0*   PLATELETS 10*3/mm3 116* 104*       Results from last 7 days   Lab Units 05/03/23  1205   INR  0.94       Assessment / Plan     ASSESSMENT:  1.  ESRD with HD underway now; mild hyperkalemia; chronic left pleural effusion  2.  Decreased mental status, multifactorial  3.  Recurrent seizure  4.  DM2 with multiple end-organ complications  5.  Hypertension of ESRD  6.  Severe hypothyroidism; family reports compliance with levothyroxine  7.  Anemia of ESRD  8.  Malnutrition and anorexia: Has had increased appetite since  has been giving her cannabis products  9.  Suspected UTI; empiric antibiotics already on board  10.  Hypertension of ESRD, exacerbated by recent seizure and inability to take oral medications  11.  Fever    PLAN:  1.  HD underway now with 3 L fluid removal planned today   2.  Sodium phosphate IV  3.  Discussed with  re goals of care; he is not sure whether she  would want HD continued or not    Thank you for involving us in the care of Zaina Martinez.  Please feel free to call with any questions.    Alejo Lange MD  05/04/23  11:17 EDT    [facility]  [phone]    Please note that portions of this note were completed with a voice recognition program.

## 2023-05-04 NOTE — CONSULTS
Patient Identification:  NAME:  Zaina Martinez  Age:  57 y.o.   Sex:  female   :  1965   MRN:  [id number]       Chief complaint: She cannot speak does not have 1, reason for consult seizures, status epilepticus    History of present illness: Patient is a 57-year-old right-handed white female known to me with past history of coronary artery disease left brain bleed in April of last year.  End-stage renal disease who has been very ill for quite a while.  She comes to the hospital after recurrent generalized seizures.  Last night she began to have them back-to-back tonic-clonic in type.  Duration more than a few minutes.  Quality tonic-clonic modifying factors Ativan, and Cerebyx and increased dose of Keppra.  Context a patient who has had seizures since she had the left brain bleed about 1 year ago.  Quality tonic-clonic location is not really pertinent.  It was generalized      Past medical history:  Past Medical History:   Diagnosis Date   • Acute CVA (cerebrovascular accident) 2022   • Acute on chronic diastolic CHF (congestive heart failure)    • Anemia    • CAD (coronary artery disease) 2021   • Diabetes    • Disease of thyroid gland    • GERD (gastroesophageal reflux disease)    • History of intracranial hemorrhage 2022   • Hyperlipidemia 2018   • Hypertension    • Rheumatoid arthritis    • Stage 5 chronic kidney disease        Past surgical history:  Past Surgical History:   Procedure Laterality Date   • CHOLECYSTECTOMY WITH INTRAOPERATIVE CHOLANGIOGRAM N/A 1/10/2022    Procedure: Laparoscopic cholecystectomy with intraoperative cholangiogram;  Surgeon: Ramana Raygoza MD;  Location: [facility];  Service: General;  Laterality: N/A;   • CYSTOSCOPY W/ URETERAL STENT PLACEMENT Left 2022    Procedure: CYSTOSCOPY URETERAL STENT INSERTION WITH RETROGRADE PYELOGRAM;  Surgeon: Bryant Phan Jr., MD;  Location: [facility];  Service: Urology;  Laterality: Left;   •  CYSTOSCOPY W/ URETERAL STENT PLACEMENT Left 1/10/2023    Procedure: CYSTOSCOPY LEFT STENT REMOVAL;  Surgeon: Bryant Phan Jr., MD;  Location: [facility];  Service: Urology;  Laterality: Left;   • EMBOLIZATION MESENTERIC ARTERY N/A 1/1/2023    Procedure: LEFT KIDNEY EMBOLIZATION;  Surgeon: Bijan Ordoñez MD;  Location: [facility] OR 18/19;  Service: Interventional Radiology;  Laterality: N/A;   • EYE SURGERY     • FEMUR IM NAILING/RODDING Right 11/10/2022    Procedure: FEMUR INTRAMEDULLARY NAILING/RODDING;  Surgeon: Glen Pierce MD;  Location: [facility] OR;  Service: Orthopedics;  Laterality: Right;   • HIP INTERTROCHANTERIC NAILING Right 11/10/2022    Procedure: HIP INTERTROCHANTERIC NAILING;  Surgeon: Glen Pierce MD;  Location: [facility];  Service: Orthopedics;  Laterality: Right;   • HYSTERECTOMY     • INSERT CENTRAL LINE AT BEDSIDE  12/31/2022        • INSERTION HEMODIALYSIS CATHETER N/A 12/6/2021    Procedure: HEMODIALYSIS CATHETER INSERTION;  Surgeon: Keli Salazar MD;  Location: [facility] OR 18/19;  Service: Vascular;  Laterality: N/A;   • INSERTION HEMODIALYSIS CATHETER N/A 5/3/2022    Procedure: TUNNELED CATHETER PLACEMENT;  Surgeon: Keli Salazar MD;  Location: [facility];  Service: Vascular;  Laterality: N/A;   • MUSCLE BIOPSY Left 6/15/2022    Procedure: Left quadriceps muscle biopsy;  Surgeon: Marques Ma MD;  Location: [facility];  Service: Neurosurgery;  Laterality: Left;   • URETEROSCOPY LASER LITHOTRIPSY WITH STENT INSERTION Left 12/30/2022    Procedure: CYSTOSCOPY WITH LEFT  URETEROSCOPY  LEFT STENT EXCHANGE;  Surgeon: Bryant Phan Jr., MD;  Location: [facility];  Service: Urology;  Laterality: Left;       Allergies:  Contrast dye (echo or unknown ct/mr), Iodinated contrast media, Ibuprofen, and Prochlorperazine    Home medications:  Medications Prior to Admission   Medication Sig Dispense Refill Last Dose    • amLODIPine (NORVASC) 5 MG tablet Take 1 tablet by mouth 2 (Two) Times a Day.      • aspirin 81 MG EC tablet Take 1 tablet by mouth Daily. 30 tablet 0    • carvedilol (COREG) 3.125 MG tablet Take 1 tablet by mouth 2 (Two) Times a Day With Meals. 60 tablet 0    • Desvenlafaxine Succinate ER 25 MG tablet sustained-release 24 hour Take 1 tablet by mouth Daily for 30 days. 30 tablet 0    • folic acid (FOLVITE) 1 MG tablet Take 1 tablet by mouth Daily.      • hydrALAZINE (APRESOLINE) 50 MG tablet Take 1 tablet by mouth 2 (Two) Times a Day.      • levETIRAcetam (Keppra) 250 MG tablet Take 1 tablet by mouth 2 (Two) Times a Day. 60 tablet 0    • levothyroxine (Synthroid) 150 MCG tablet Take 2 tablets by mouth Daily. 60 tablet 0    • melatonin 3 MG tablet Take 1 tablet by mouth At Night As Needed for Sleep.      • Methoxy PEG-Epoetin Beta (MIRCERA IJ) 100 mcg Every 14 (Fourteen) Days.      • pantoprazole (PROTONIX) 40 MG EC tablet Take 1 tablet by mouth Daily. 90 tablet 0    • rOPINIRole (REQUIP) 2 MG tablet Take 1 tablet by mouth Every Night. (Patient taking differently: Take 1.5 tablets by mouth Every Night.) 30 tablet 0    • vitamin D (ERGOCALCIFEROL) 1.25 MG (68646 UT) capsule capsule Take 1 capsule by mouth Every 7 (Seven) Days. 5 capsule          [facility] medications:  amLODIPine, 10 mg, Oral, Q24H  aspirin, 81 mg, Oral, Daily  carvedilol, 3.125 mg, Oral, BID With Meals  cefTRIAXone, 1 g, Intravenous, Q24H  Desvenlafaxine Succinate ER, 25 mg, Oral, Daily  folic acid, 1 mg, Oral, Daily  fosphenytoin, 15 mg PE/kg, Intravenous, Once   And  fosphenytoin, 200 mg PE, Intravenous, Q8H  hydrALAZINE, 100 mg, Oral, Q8H  levETIRAcetam, 1,000 mg, Intravenous, Q6H  levothyroxine, 350 mcg, Oral, Q AM  LORazepam, 2 mg, Intravenous, Once  pantoprazole, 40 mg, Oral, Daily  sodium phosphate IVPB, 15 mmol, Intravenous, Once         •  acetaminophen  •  albuterol  •  dextrose  •  dextrose  •  glucagon (human recombinant)  •   hydrALAZINE  •  LORazepam  •  melatonin  •  sennosides-docusate  •  [COMPLETED] Insert Peripheral IV **AND** sodium chloride    Family history:  Family History   Problem Relation Age of Onset   • Malig Hyperthermia Neg Hx        Social history:  Social History     Tobacco Use   • Smoking status: Never   • Smokeless tobacco: Never   Vaping Use   • Vaping Use: Never used   Substance Use Topics   • Alcohol use: Never   • Drug use: Never       Review of systems:    She could not tell me anything the  and is here and he is able to say that she was able to communicate within the last week or 2 until this has happened.  She has had recurrent seizure since she had the left brain bleed last April.  No other review of systems possible at this time from the patient or the  and I have reviewed the chart fully I have seen her previously    Objective:  Vitals Ranges:   Temp:  [97 °F (36.1 °C)-102.1 °F (38.9 °C)] 99.4 °F (37.4 °C)  Heart Rate:  [] 109  Resp:  [18-24] 18  BP: (127-200)/() 168/91      Physical Exam:  Patient is obtunded cannot answer questions of orientation fund of knowledge attention span concentration recent remote memory.  Her head is falling forward on her chest.  She has regular breathing about 18 times per minute.  Pupils small approximately 2-1/2 with minimal reaction eyes are conjugate no other cranial nerves could be tested.  There is no withdrawal grimace or posturing to pain in any extremity.  Distal atrophy no fasciculations reflexes trace toes downgoing.  Remainder of exam could not be performed.  She is obtunded getting dialysis also.  At this moment    Results review:   I reviewed the patient's new clinical results.    Data review:  Lab Results (last 24 hours)     Procedure Component Value Units Date/Time    POC Glucose Once [[id number]]  (Normal) Collected: 05/04/23 1123    Specimen: Blood Updated: 05/04/23 1126     Glucose 79 mg/dL      Comment: Meter: [id number] :  [id number] [facility]       Urine Culture - Urine, Straight Cath [[id number]]  (Normal) Collected: 05/03/23 1212    Specimen: Urine from Straight Cath Updated: 05/04/23 1046     Urine Culture No growth    Hepatitis B Surface Antigen [[id number]] Collected: 05/04/23 1015    Specimen: Blood Updated: 05/04/23 1038    POC Glucose Once [[id number]]  (Normal) Collected: 05/04/23 0916    Specimen: Blood Updated: 05/04/23 0918     Glucose 112 mg/dL      Comment: Meter: [id number] : ashli Abebe RN       Hemoglobin A1c [[id number]]  (Abnormal) Collected: 05/04/23 0543    Specimen: Blood Updated: 05/04/23 0845     Hemoglobin A1C 4.60 %     Narrative:      Hemoglobin A1C Ranges:    Increased Risk for Diabetes  5.7% to 6.4%  Diabetes                     >= 6.5%  Diabetic Goal                < 7.0%    POC Glucose Once [[id number]]  (Abnormal) Collected: 05/04/23 0810    Specimen: Blood Updated: 05/04/23 0812     Glucose 60 mg/dL      Comment: Meter: [id number] : ashli Abebe RN       Phosphorus [[id number]]  (Abnormal) Collected: 05/04/23 0543    Specimen: Blood Updated: 05/04/23 0700     Phosphorus 1.5 mg/dL     Comprehensive Metabolic Panel [[id number]]  (Abnormal) Collected: 05/04/23 0543    Specimen: Blood Updated: 05/04/23 0656     Glucose 53 mg/dL      BUN 25 mg/dL      Creatinine 2.48 mg/dL      Sodium 135 mmol/L      Potassium 5.3 mmol/L      Comment: Slight hemolysis detected by analyzer. Results may be affected.        Chloride 98 mmol/L      CO2 25.0 mmol/L      Calcium 8.3 mg/dL      Total Protein 6.2 g/dL      Albumin 2.6 g/dL      ALT (SGPT) 10 U/L      AST (SGOT) 34 U/L      Comment: Slight hemolysis detected by analyzer. Results may be affected.        Alkaline Phosphatase 106 U/L      Total Bilirubin 0.4 mg/dL      Globulin 3.6 gm/dL      A/G Ratio 0.7 g/dL      BUN/Creatinine Ratio 10.1     Anion Gap 12.0 mmol/L      eGFR 22.1 mL/min/1.73     Narrative:      GFR Normal  >60  Chronic Kidney Disease <60  Kidney Failure <15      TSH [[id number]]  (Abnormal) Collected: 05/04/23 0543    Specimen: Blood Updated: 05/04/23 0656     TSH 23.700 uIU/mL     Lipid Panel [[id number]]  (Abnormal) Collected: 05/04/23 0543    Specimen: Blood Updated: 05/04/23 0651     Total Cholesterol 102 mg/dL      Triglycerides 143 mg/dL      HDL Cholesterol 27 mg/dL      LDL Cholesterol  50 mg/dL      VLDL Cholesterol 25 mg/dL      LDL/HDL Ratio 1.72    Narrative:      Cholesterol Reference Ranges  (U.S. Department of Health and Human Services ATP III Classifications)    Desirable          <200 mg/dL  Borderline High    200-239 mg/dL  High Risk          >240 mg/dL      Triglyceride Reference Ranges  (U.S. Department of Health and Human Services ATP III Classifications)    Normal           <150 mg/dL  Borderline High  150-199 mg/dL  High             200-499 mg/dL  Very High        >500 mg/dL    HDL Reference Ranges  (U.S. Department of Health and Human Services ATP III Classifications)    Low     <40 mg/dl (major risk factor for CHD)  High    >60 mg/dl ('negative' risk factor for CHD)        LDL Reference Ranges  (U.S. Department of Health and Human Services ATP III Classifications)    Optimal          <100 mg/dL  Near Optimal     100-129 mg/dL  Borderline High  130-159 mg/dL  High             160-189 mg/dL  Very High        >189 mg/dL    CBC & Differential [[id number]]  (Abnormal) Collected: 05/04/23 0543    Specimen: Blood Updated: 05/04/23 0642    Narrative:      The following orders were created for panel order CBC & Differential.  Procedure                               Abnormality         Status                     ---------                               -----------         ------                     CBC Auto Differential[[id number]]        Abnormal            Final result                 Please view results for these tests on the individual orders.    CBC Auto Differential [[id number]]  (Abnormal) Collected:  05/04/23 0543    Specimen: Blood Updated: 05/04/23 0642     WBC 17.99 10*3/mm3      RBC 3.07 10*6/mm3      Hemoglobin 8.5 g/dL      Hematocrit 25.5 %      MCV 83.1 fL      MCH 27.7 pg      MCHC 33.3 g/dL      RDW 17.7 %      RDW-SD 51.7 fl      MPV 10.6 fL      Platelets 116 10*3/mm3      Neutrophil % 85.5 %      Lymphocyte % 6.8 %      Monocyte % 6.8 %      Eosinophil % 0.0 %      Basophil % 0.2 %      Neutrophils, Absolute 15.38 10*3/mm3      Lymphocytes, Absolute 1.23 10*3/mm3      Monocytes, Absolute 1.22 10*3/mm3      Eosinophils, Absolute 0.00 10*3/mm3      Basophils, Absolute 0.03 10*3/mm3     Lactic Acid, Plasma [[id number]]  (Normal) Collected: 05/03/23 2039    Specimen: Blood Updated: 05/03/23 2140     Lactate 1.2 mmol/L     Potassium [[id number]]  (Abnormal) Collected: 05/03/23 2039    Specimen: Blood Updated: 05/03/23 2140     Potassium 6.0 mmol/L     Blood Culture - Blood, Hand, Left [[id number]] Collected: 05/03/23 2039    Specimen: Blood from Hand, Left Updated: 05/03/23 2112    POC Glucose Once [[id number]]  (Normal) Collected: 05/03/23 1903    Specimen: Blood Updated: 05/03/23 1914     Glucose 119 mg/dL      Comment: Meter: [id number] : [id number] Sunni SAAVEDRA       Blood Culture - Blood, Arm, Right [[id number]] Collected: 05/03/23 1901    Specimen: Blood from Arm, Right Updated: 05/03/23 1906    POC Glucose Once [[id number]]  (Normal) Collected: 05/03/23 1713    Specimen: Blood Updated: 05/03/23 1714     Glucose 122 mg/dL      Comment: Meter: [id number] : [id number] Katrin SAAVEDRA       BNP [[id number]]  (Abnormal) Collected: 05/03/23 1527    Specimen: Blood Updated: 05/03/23 1651     proBNP >70,000.0 pg/mL     Narrative:      Among patients with dyspnea, NT-proBNP is highly sensitive for the detection of acute congestive heart failure. In addition NT-proBNP of <300 pg/ml effectively rules out acute congestive heart failure with 99% negative predictive value.    Results may be falsely  decreased if patient taking Biotin.      Blood Gas, Arterial - [[id number]]  (Abnormal) Collected: 05/03/23 1608    Specimen: Arterial Blood Updated: 05/03/23 1612     Site Arterial: left radial     Omar's Test Positive     pH, Arterial 7.459 pH units      pCO2, Arterial 44.6 mm Hg      pO2, Arterial 77.5 mm Hg      HCO3, Arterial 31.7 mmol/L      Base Excess, Arterial 6.9 mmol/L      O2 Saturation Calculated 95.9 %      Comment: Meter: [id number] : [id number] Alan Lawler        A-a DO2 0.8 mmHg      Barometric Pressure for Blood Gas 745.2 mmHg      Modality Room Air     FIO2 21 %      Rate 24 Breaths/minute     High Sensitivity Troponin T 2Hr [[id number]]  (Abnormal) Collected: 05/03/23 1527    Specimen: Blood Updated: 05/03/23 1559     HS Troponin T 187 ng/L      Troponin T Delta -57 ng/L     Narrative:      High Sensitive Troponin T Reference Range:  <10.0 ng/L- Negative Female for AMI  <15.0 ng/L- Negative Male for AMI  >=10 - Abnormal Female indicating possible myocardial injury.  >=15 - Abnormal Male indicating possible myocardial injury.   Clinicians would have to utilize clinical acumen, EKG, Troponin, and serial changes to determine if it is an Acute Myocardial Infarction or myocardial injury due to an underlying chronic condition.         Basic Metabolic Panel [[id number]]  (Abnormal) Collected: 05/03/23 1527    Specimen: Blood Updated: 05/03/23 1558     Glucose 120 mg/dL      BUN 19 mg/dL      Creatinine 1.80 mg/dL      Sodium 137 mmol/L      Potassium 5.5 mmol/L      Comment: Slight hemolysis detected by analyzer. Results may be affected.        Chloride 105 mmol/L      CO2 25.3 mmol/L      Calcium 7.3 mg/dL      BUN/Creatinine Ratio 10.6     Anion Gap 6.7 mmol/L      eGFR 32.5 mL/min/1.73     Narrative:      GFR Normal >60  Chronic Kidney Disease <60  Kidney Failure <15      Urinalysis, Microscopic Only - Straight Cath [[id number]]  (Abnormal) Collected: 05/03/23 1212    Specimen: Urine from  Straight Cath Updated: 05/03/23 1536     RBC, UA 13-20 /HPF      WBC, UA Too Numerous to Count /HPF      Bacteria, UA 2+ /HPF      Squamous Epithelial Cells, UA 0-2 /HPF      Hyaline Casts, UA None Seen /LPF      WBC Clumps, UA Small/1+ /HPF      Methodology Manual Light Microscopy    Urinalysis With Culture If Indicated - Straight Cath [[id number]]  (Abnormal) Collected: 05/03/23 1212    Specimen: Urine from Straight Cath Updated: 05/03/23 1525     Color, UA Yellow     Appearance, UA Cloudy     pH, UA >=9.0     Specific Gravity, UA 1.013     Glucose, UA Negative     Ketones, UA Negative     Bilirubin, UA Negative     Blood, UA Moderate (2+)     Protein, UA >=300 mg/dL (3+)     Leuk Esterase, UA Large (3+)     Nitrite, UA Negative     Urobilinogen, UA 0.2 E.U./dL    Narrative:      In absence of clinical symptoms, the presence of pyuria, bacteria, and/or nitrites on the urinalysis result does not correlate with infection.    POC Glucose Once [[id number]]  (Normal) Collected: 05/03/23 1515    Specimen: Blood Updated: 05/03/23 1516     Glucose 120 mg/dL      Comment: Meter: [id number] : [id number] Geni SAAVEDRA       COVID PRE-OP / PRE-PROCEDURE SCREENING ORDER (NO ISOLATION) - Swab, Nasopharynx [[id number]]  (Normal) Collected: 05/03/23 1156    Specimen: Swab from Nasopharynx Updated: 05/03/23 1314    Narrative:      The following orders were created for panel order COVID PRE-OP / PRE-PROCEDURE SCREENING ORDER (NO ISOLATION) - Swab, Nasopharynx.  Procedure                               Abnormality         Status                     ---------                               -----------         ------                     COVID-19,[facility] IN-HOUSE...[[id number]]  Normal              Final result                 Please view results for these tests on the individual orders.    COVID-19,[facility] IN-HOUSE CEPHEID/MARTY NP SWAB IN TRANSPORT MEDIA 8-12 HR TAT - Swab, Nasopharynx [[id number]]  (Normal) Collected: 05/03/23 1156     Specimen: Swab from Nasopharynx Updated: 05/03/23 1314     COVID19 Not Detected    Narrative:      Fact sheet for providers: https://www.fda.gov/media/198291/download     Fact sheet for patients: https://www.fda.gov/media/177757/download    High Sensitivity Troponin T [[id number]]  (Abnormal) Collected: 05/03/23 1205    Specimen: Blood Updated: 05/03/23 1303     HS Troponin T 244 ng/L     Narrative:      High Sensitive Troponin T Reference Range:  <10.0 ng/L- Negative Female for AMI  <15.0 ng/L- Negative Male for AMI  >=10 - Abnormal Female indicating possible myocardial injury.  >=15 - Abnormal Male indicating possible myocardial injury.   Clinicians would have to utilize clinical acumen, EKG, Troponin, and serial changes to determine if it is an Acute Myocardial Infarction or myocardial injury due to an underlying chronic condition.         TSH [[id number]]  (Abnormal) Collected: 05/03/23 1205    Specimen: Blood Updated: 05/03/23 1302     TSH 32.800 uIU/mL     T4, Free [[id number]]  (Normal) Collected: 05/03/23 1205    Specimen: Blood Updated: 05/03/23 1302     Free T4 1.48 ng/dL     Narrative:      Results may be falsely increased if patient taking Biotin.      Urine Drug Screen - Straight Cath [[id number]]  (Abnormal) Collected: 05/03/23 1213    Specimen: Urine from Straight Cath Updated: 05/03/23 1259     Amphet/Methamphet, Screen Negative     Barbiturates Screen, Urine Negative     Benzodiazepine Screen, Urine Negative     Cocaine Screen, Urine Negative     Opiate Screen Negative     THC, Screen, Urine Positive     Methadone Screen, Urine Negative     Oxycodone Screen, Urine Negative     Fentanyl, Urine Negative    Narrative:      Negative Thresholds Per Drugs Screened:    Amphetamines                 500 ng/ml  Barbiturates                 200 ng/ml  Benzodiazepines              100 ng/ml  Cocaine                      300 ng/ml  Methadone                    300 ng/ml  Opiates                      300  ng/ml  Oxycodone                    100 ng/ml  THC                           50 ng/ml  Fentanyl                       5 ng/ml      The Normal Value for all drugs tested is negative. This report includes final unconfirmed screening results to be used for medical treatment purposes only. Unconfirmed results must not be used for non-medical purposes such as employment or legal testing. Clinical consideration should be applied to any drug of abuse test, particularly when unconfirmed results are used.            Ethanol [[id number]] Collected: 05/03/23 1205    Specimen: Blood Updated: 05/03/23 1258     Ethanol <10 mg/dL      Ethanol % <0.010 %     CK [[id number]]  (Normal) Collected: 05/03/23 1205    Specimen: Blood Updated: 05/03/23 1258     Creatine Kinase 44 U/L     Magnesium [[id number]]  (Normal) Collected: 05/03/23 1205    Specimen: Blood Updated: 05/03/23 1258     Magnesium 1.7 mg/dL     Comprehensive Metabolic Panel [[id number]]  (Abnormal) Collected: 05/03/23 1205    Specimen: Blood Updated: 05/03/23 1258     Glucose 85 mg/dL      BUN 22 mg/dL      Creatinine 2.07 mg/dL      Sodium 132 mmol/L      Potassium 6.0 mmol/L      Chloride 97 mmol/L      CO2 30.0 mmol/L      Calcium 8.4 mg/dL      Total Protein 6.8 g/dL      Albumin 3.0 g/dL      ALT (SGPT) 8 U/L      AST (SGOT) 28 U/L      Alkaline Phosphatase 133 U/L      Total Bilirubin 0.5 mg/dL      Globulin 3.8 gm/dL      A/G Ratio 0.8 g/dL      BUN/Creatinine Ratio 10.6     Anion Gap 5.0 mmol/L      eGFR 27.5 mL/min/1.73     Narrative:      GFR Normal >60  Chronic Kidney Disease <60  Kidney Failure <15      Protime-INR [[id number]]  (Normal) Collected: 05/03/23 1205    Specimen: Blood Updated: 05/03/23 1246     Protime 12.7 Seconds      INR 0.94    CBC & Differential [[id number]]  (Abnormal) Collected: 05/03/23 1205    Specimen: Blood Updated: 05/03/23 1234    Narrative:      The following orders were created for panel order CBC & Differential.  Procedure                                Abnormality         Status                     ---------                               -----------         ------                     CBC Auto Differential[[id number]]        Abnormal            Final result                 Please view results for these tests on the individual orders.    CBC Auto Differential [[id number]]  (Abnormal) Collected: 05/03/23 1205    Specimen: Blood Updated: 05/03/23 1234     WBC 7.71 10*3/mm3      RBC 3.66 10*6/mm3      Hemoglobin 10.0 g/dL      Hematocrit 30.8 %      MCV 84.2 fL      MCH 27.3 pg      MCHC 32.5 g/dL      RDW 17.2 %      RDW-SD 52.8 fl      MPV 10.0 fL      Platelets 104 10*3/mm3      Neutrophil % 71.4 %      Lymphocyte % 18.9 %      Monocyte % 8.0 %      Eosinophil % 0.9 %      Basophil % 0.4 %      Neutrophils, Absolute 5.50 10*3/mm3      Lymphocytes, Absolute 1.46 10*3/mm3      Monocytes, Absolute 0.62 10*3/mm3      Eosinophils, Absolute 0.07 10*3/mm3      Basophils, Absolute 0.03 10*3/mm3     POC Glucose Once [[id number]]  (Normal) Collected: 05/03/23 1141    Specimen: Blood Updated: 05/03/23 1143     Glucose 82 mg/dL      Comment: Meter: [id number] : [id number] Geni SAAVEDRA              Imaging:  Imaging Results (Last 24 Hours)     Procedure Component Value Units Date/Time    CT Head Without Contrast [[id number]] Collected: 05/03/23 1414     Updated: 05/03/23 1531    Narrative:      EMERGENCY NONCONTRAST HEAD CT ON 05/03/2023     CLINICAL HISTORY: Seizure, confusion.     TECHNIQUE: Spiral CT images were obtained from the base of skull to the  vertex without intravenous contrast and the images were reformatted and  are submitted in 3 mm thick axial, sagittal and coronal CT sections with  brain algorithm.     COMPARISON: This is correlated to 12 prior head CTs dating back to  02/23/2022 and head CT following left frontal lobe intraparenchymal  hemorrhage on 04/29/2022, 3 more recent head CTs on 12/04/2022 and  12/26/2022 and 01/13/2023, as well  as prior MRIs of the brain on  04/28/2022 and 04/30/2022.     FINDINGS: The patient was moving during this exam and the exam is  slightly compromised by motion artifact which causes blurring of many of  the images and slightly limits evaluation. Back on 04/29/2022 the  patient had a 2 x 1.3 x 2 cm acute intraparenchymal hemorrhage in the  superior left frontal lobe. On the current head CT there is 7 x 2 mm  area of increased density at the site of prior hemorrhage that is felt  to be some hemosiderin deposition related to the prior hemorrhage. There  is some minimal surrounding low-density felt to be surrounding gliosis.  There is patchy low-density in the periventricular extending to  subcortical white matter of cerebral hemispheres consistent with  mild-to-moderate small vessel disease. The remainder of the brain  parenchyma is normal in attenuation. The ventricles are normal in size.  I see no mass effect and no midline shift and no extra-axial fluid  collections are identified and there is no evidence of acute  intracranial hemorrhage. Patient has a nasopharyngeal tube extending  from the right nasal cavity into the pharynx and the distal aspect of  the tube is not assessed on this exam. Paranasal sinuses and mastoid air  cells and middle ear cavities are clear. The calvarium and skull base  are normal in appearance.       Impression:      1. This patient was moving quite a bit during the exam and motion  artifact causes blurring of many of the images and slightly limits  evaluation.  2. Overall, no acute intracranial abnormality is identified with no  significant change when compared to prior noncontrast head CT from  [facility] on 03/20/2023.  3. There is a 7 x 2 mm area of hyperdensity in the superior left frontal  lobe compatible with an area of stable chronic hemosiderin deposition  where the patient had a remote prior hemorrhage at this location back on  04/29/2022. There is mild-to-moderate  small vessel disease in cerebral  white matter and mild cerebral atrophy.  4. Patient has a nasopharyngeal tube with the tube extending through the  right nasal cavity into the nasopharynx and the inferior aspect of the  tube is not assessed on this exam. The remainder of the head CT is  normal.     Radiation dose reduction techniques were utilized, including automated  exposure control and exposure modulation based on body size.     This report was finalized on 5/3/2023 3:28 PM by Dr. Suhas Sanches M.D.       XR Chest 1 View [[id number]] Collected: 05/03/23 1227     Updated: 05/03/23 1243    Narrative:      PORTABLE CHEST X-RAY     HISTORY: Shortness of breath.     Portable chest x-ray is provided. Correlation: 03/20/2023.     FINDINGS: There is a dual lumen right IJ dialysis catheter with its tip  in the right atrium and surgical clips from prior cholecystectomy. A  moderate volume left pleural effusion is again observed and there are  some air bronchograms in the left perihilar region which may be due to  atelectasis or pneumonia in the left lung. Right lung remains clear and  free of volume overload, edema, or pneumonia. No pleural effusion on the  right.       Impression:      No change in the abnormal appearance of the left hemithorax.     This report was finalized on 5/3/2023 12:40 PM by Dr. Adonis Burch M.D.              PPE worn at all times washed before washed up afterwards disposed of everything properly is not within 6 feet of her for more than a few minutes during my exam no aerosols used at any point  Assessment and Plan:     This patient presents with recurrent seizures consistent with status epilepticus.  I have increased the Keppra to 1000 IV every 6 hours Cerebyx bolus was given and now it is 200 IV every 8 hours.  She does not appear to be having clinical seizure activity at this time.  She has received some Ativan but currently is obtunded.  I am going to order a continuous EEG to see if she  is having seizure activity  This patient is known to me with a history of left hemisphere hemorrhage in April 2022 which is almost certainly the etiology of her recurrent seizure since then.  I am aware she is in end-stage renal patient is not wanting aggressive measures per the  and she is DNR/no CPR.  At this point.  Nonetheless I would like to know if she is having seizures right now or not so I believe doing this EEG since it is not invasive is going to be okay and the  agrees        Adonis Fabian MD  05/04/23  11:32 EDT

## 2023-05-04 NOTE — H&P
"Daily progress note    Primary care physician      Chief complaint  Events noted and doing same and getting hemodialysis and family at bedside.    History of present illness  57-year-old white female with very complex past medical history and well-known to our service including CVA end-stage renal disease congestive heart failure hypertension hyperlipidemia hypothyroidism and seizure disorder brought to the emergency room by the  with worsening mental status and evaluated in ER found to have postictal state admit for management.  Patient also found to have UTI with metabolic encephalopathy.  Patient is DNR per her wishes.  Patient is unable to give detailed history most of the history obtained per chart nursing staff old record and .  Patient has no fever chills chest pain shortness of breath and has not missed any hemodialysis.     REVIEW OF SYSTEMS  All systems reviewed and negative except for those discussed in HPI.     PHYSICAL EXAM  Blood pressure 154/73, pulse 93, temperature 98.3 °F (36.8 °C), temperature source Oral, resp. rate 16, height 157.5 cm (62.01\"), weight 61.4 kg (135 lb 5.8 oz), SpO2 93 %, not currently breastfeeding.    GENERAL:  Restless confused but no respiratory distress   HENT: NCAT: nares patent: Neck supple  EYES: no scleral icterus  CV: regular rhythm, normal rate  RESPIRATORY: normal effort  ABDOMEN: soft, NTND: Bowel sounds positive  MUSCULOSKELETAL: no deformity  NEURO: alert with nonfocal neuro exam  PSYCH:  calm, cooperative  SKIN: warm, dry     LAB RESULTS  Lab Results (last 24 hours)     Procedure Component Value Units Date/Time    Hepatitis B Surface Antigen [[id number]]  (Normal) Collected: 05/04/23 1015    Specimen: Blood Updated: 05/04/23 1144     Hepatitis B Surface Ag Non-Reactive    POC Glucose Once [[id number]]  (Normal) Collected: 05/04/23 1123    Specimen: Blood Updated: 05/04/23 1126     Glucose 79 mg/dL      Comment: Meter: [id number] : [id number] " Baires Americamil QUENTIN       Urine Culture - Urine, Straight Cath [[id number]]  (Normal) Collected: 05/03/23 1212    Specimen: Urine from Straight Cath Updated: 05/04/23 1046     Urine Culture No growth    POC Glucose Once [[id number]]  (Normal) Collected: 05/04/23 0916    Specimen: Blood Updated: 05/04/23 0918     Glucose 112 mg/dL      Comment: Meter: [id number] : ashli Abebe RN       Hemoglobin A1c [[id number]]  (Abnormal) Collected: 05/04/23 0543    Specimen: Blood Updated: 05/04/23 0845     Hemoglobin A1C 4.60 %     Narrative:      Hemoglobin A1C Ranges:    Increased Risk for Diabetes  5.7% to 6.4%  Diabetes                     >= 6.5%  Diabetic Goal                < 7.0%    POC Glucose Once [[id number]]  (Abnormal) Collected: 05/04/23 0810    Specimen: Blood Updated: 05/04/23 0812     Glucose 60 mg/dL      Comment: Meter: [id number] : ashli Abebe RN       Phosphorus [[id number]]  (Abnormal) Collected: 05/04/23 0543    Specimen: Blood Updated: 05/04/23 0700     Phosphorus 1.5 mg/dL     Comprehensive Metabolic Panel [[id number]]  (Abnormal) Collected: 05/04/23 0543    Specimen: Blood Updated: 05/04/23 0656     Glucose 53 mg/dL      BUN 25 mg/dL      Creatinine 2.48 mg/dL      Sodium 135 mmol/L      Potassium 5.3 mmol/L      Comment: Slight hemolysis detected by analyzer. Results may be affected.        Chloride 98 mmol/L      CO2 25.0 mmol/L      Calcium 8.3 mg/dL      Total Protein 6.2 g/dL      Albumin 2.6 g/dL      ALT (SGPT) 10 U/L      AST (SGOT) 34 U/L      Comment: Slight hemolysis detected by analyzer. Results may be affected.        Alkaline Phosphatase 106 U/L      Total Bilirubin 0.4 mg/dL      Globulin 3.6 gm/dL      A/G Ratio 0.7 g/dL      BUN/Creatinine Ratio 10.1     Anion Gap 12.0 mmol/L      eGFR 22.1 mL/min/1.73     Narrative:      GFR Normal >60  Chronic Kidney Disease <60  Kidney Failure <15      TSH [[id number]]  (Abnormal) Collected: 05/04/23 0543     Specimen: Blood Updated: 05/04/23 0656     TSH 23.700 uIU/mL     Lipid Panel [[id number]]  (Abnormal) Collected: 05/04/23 0543    Specimen: Blood Updated: 05/04/23 0651     Total Cholesterol 102 mg/dL      Triglycerides 143 mg/dL      HDL Cholesterol 27 mg/dL      LDL Cholesterol  50 mg/dL      VLDL Cholesterol 25 mg/dL      LDL/HDL Ratio 1.72    Narrative:      Cholesterol Reference Ranges  (U.S. Department of Health and Human Services ATP III Classifications)    Desirable          <200 mg/dL  Borderline High    200-239 mg/dL  High Risk          >240 mg/dL      Triglyceride Reference Ranges  (U.S. Department of Health and Human Services ATP III Classifications)    Normal           <150 mg/dL  Borderline High  150-199 mg/dL  High             200-499 mg/dL  Very High        >500 mg/dL    HDL Reference Ranges  (U.S. Department of Health and Human Services ATP III Classifications)    Low     <40 mg/dl (major risk factor for CHD)  High    >60 mg/dl ('negative' risk factor for CHD)        LDL Reference Ranges  (U.S. Department of Health and Human Services ATP III Classifications)    Optimal          <100 mg/dL  Near Optimal     100-129 mg/dL  Borderline High  130-159 mg/dL  High             160-189 mg/dL  Very High        >189 mg/dL    CBC & Differential [[id number]]  (Abnormal) Collected: 05/04/23 0543    Specimen: Blood Updated: 05/04/23 0642    Narrative:      The following orders were created for panel order CBC & Differential.  Procedure                               Abnormality         Status                     ---------                               -----------         ------                     CBC Auto Differential[[id number]]        Abnormal            Final result                 Please view results for these tests on the individual orders.    CBC Auto Differential [[id number]]  (Abnormal) Collected: 05/04/23 0543    Specimen: Blood Updated: 05/04/23 0642     WBC 17.99 10*3/mm3      RBC 3.07 10*6/mm3       Hemoglobin 8.5 g/dL      Hematocrit 25.5 %      MCV 83.1 fL      MCH 27.7 pg      MCHC 33.3 g/dL      RDW 17.7 %      RDW-SD 51.7 fl      MPV 10.6 fL      Platelets 116 10*3/mm3      Neutrophil % 85.5 %      Lymphocyte % 6.8 %      Monocyte % 6.8 %      Eosinophil % 0.0 %      Basophil % 0.2 %      Neutrophils, Absolute 15.38 10*3/mm3      Lymphocytes, Absolute 1.23 10*3/mm3      Monocytes, Absolute 1.22 10*3/mm3      Eosinophils, Absolute 0.00 10*3/mm3      Basophils, Absolute 0.03 10*3/mm3     Lactic Acid, Plasma [[id number]]  (Normal) Collected: 05/03/23 2039    Specimen: Blood Updated: 05/03/23 2140     Lactate 1.2 mmol/L     Potassium [[id number]]  (Abnormal) Collected: 05/03/23 2039    Specimen: Blood Updated: 05/03/23 2140     Potassium 6.0 mmol/L     Blood Culture - Blood, Hand, Left [[id number]] Collected: 05/03/23 2039    Specimen: Blood from Hand, Left Updated: 05/03/23 2112    POC Glucose Once [[id number]]  (Normal) Collected: 05/03/23 1903    Specimen: Blood Updated: 05/03/23 1914     Glucose 119 mg/dL      Comment: Meter: [id number] : [id number] Sunni SAAVEDRA       Blood Culture - Blood, Arm, Right [[id number]] Collected: 05/03/23 1901    Specimen: Blood from Arm, Right Updated: 05/03/23 1906    POC Glucose Once [[id number]]  (Normal) Collected: 05/03/23 1713    Specimen: Blood Updated: 05/03/23 1714     Glucose 122 mg/dL      Comment: Meter: [id number] : [id number] Katrin SAAVDERA       BNP [[id number]]  (Abnormal) Collected: 05/03/23 1527    Specimen: Blood Updated: 05/03/23 1651     proBNP >70,000.0 pg/mL     Narrative:      Among patients with dyspnea, NT-proBNP is highly sensitive for the detection of acute congestive heart failure. In addition NT-proBNP of <300 pg/ml effectively rules out acute congestive heart failure with 99% negative predictive value.    Results may be falsely decreased if patient taking Biotin.      Blood Gas, Arterial - [[id number]]  (Abnormal) Collected: 05/03/23 5608     Specimen: Arterial Blood Updated: 05/03/23 1612     Site Arterial: left radial     Omar's Test Positive     pH, Arterial 7.459 pH units      pCO2, Arterial 44.6 mm Hg      pO2, Arterial 77.5 mm Hg      HCO3, Arterial 31.7 mmol/L      Base Excess, Arterial 6.9 mmol/L      O2 Saturation Calculated 95.9 %      Comment: Meter: [id number] : [id number] Alan Lawler        A-a DO2 0.8 mmHg      Barometric Pressure for Blood Gas 745.2 mmHg      Modality Room Air     FIO2 21 %      Rate 24 Breaths/minute     High Sensitivity Troponin T 2Hr [[id number]]  (Abnormal) Collected: 05/03/23 1527    Specimen: Blood Updated: 05/03/23 1559     HS Troponin T 187 ng/L      Troponin T Delta -57 ng/L     Narrative:      High Sensitive Troponin T Reference Range:  <10.0 ng/L- Negative Female for AMI  <15.0 ng/L- Negative Male for AMI  >=10 - Abnormal Female indicating possible myocardial injury.  >=15 - Abnormal Male indicating possible myocardial injury.   Clinicians would have to utilize clinical acumen, EKG, Troponin, and serial changes to determine if it is an Acute Myocardial Infarction or myocardial injury due to an underlying chronic condition.         Basic Metabolic Panel [[id number]]  (Abnormal) Collected: 05/03/23 1527    Specimen: Blood Updated: 05/03/23 1558     Glucose 120 mg/dL      BUN 19 mg/dL      Creatinine 1.80 mg/dL      Sodium 137 mmol/L      Potassium 5.5 mmol/L      Comment: Slight hemolysis detected by analyzer. Results may be affected.        Chloride 105 mmol/L      CO2 25.3 mmol/L      Calcium 7.3 mg/dL      BUN/Creatinine Ratio 10.6     Anion Gap 6.7 mmol/L      eGFR 32.5 mL/min/1.73     Narrative:      GFR Normal >60  Chronic Kidney Disease <60  Kidney Failure <15          Imaging Results (Last 24 Hours)     ** No results found for the last 24 hours. **        ECG 12 Lead             Component  Ref Range & Units 11:53  (5/3/23) 1 mo ago  (3/20/23) 2 mo ago  (2/5/23) 3 mo ago  (1/22/23) 3 mo  ago  (1/22/23) 4 mo ago  (1/1/23) 4 mo ago  (12/26/22)   QT Interval  ms 424  415  418  391  372  397  514    Resulting Agency BH ECG BH ECG BH ECG BH ECG BH ECG BH ECG BH ECG             HEART RATE= 72  bpm  RR Interval= 833  ms  NJ Interval= 182  ms  P Horizontal Axis= 17  deg  P Front Axis= 73  deg  QRSD Interval= 100  ms  QT Interval= 424  ms  QRS Axis= -45  deg  T Wave Axis= 60  deg  - ABNORMAL ECG -  Sinus rhythm  Left anterior fascicular block  Borderline low voltage, extremity leads  Nonspecific T abnormalities, lateral leads  When compared with ECG of 20-Mar-2023 11:12:37,  No significant change              Current Facility-Administered Medications:   •  acetaminophen (TYLENOL) tablet 650 mg, 650 mg, Oral, Q4H PRN **OR** acetaminophen (TYLENOL) 160 MG/5ML solution 650 mg, 650 mg, Oral, Q4H PRN **OR** acetaminophen (TYLENOL) suppository 650 mg, 650 mg, Rectal, Q4H PRN, Dimitri Pena MD  •  acetaminophen (TYLENOL) tablet 650 mg, 650 mg, Oral, Q6H PRN, Dimitri Pena MD  •  albuterol (PROVENTIL) nebulizer solution 0.5% 2.5 mg/0.5mL, 10 mg, Nebulization, Q4H PRN, Dimitri Pena MD  •  amLODIPine (NORVASC) tablet 10 mg, 10 mg, Oral, Q24H, Dimitri Pena MD  •  aspirin EC tablet 81 mg, 81 mg, Oral, Daily, Dimitri Pena MD  •  carvedilol (COREG) tablet 3.125 mg, 3.125 mg, Oral, BID With Meals, Dimitri Pena MD  •  cefTRIAXone (ROCEPHIN) 1 g in sodium chloride 0.9 % 100 mL IVPB-VTB, 1 g, Intravenous, Q24H, Dimitri Pena MD, Last Rate: 200 mL/hr at 05/03/23 2337, 1 g at 05/03/23 2337  •  Desvenlafaxine Succinate ER 25 mg, 25 mg, Oral, Daily, Dimitri Pena MD  •  dextrose (D50W) (25 g/50 mL) IV injection 25 g, 25 g, Intravenous, Q15 Min PRN, Dimitri Pena MD, 25 g at 05/04/23 0820  •  dextrose (GLUTOSE) oral gel 15 g, 15 g, Oral, Q15 Min PRN, Dimitri Pena MD  •  folic acid (FOLVITE) tablet 1 mg, 1 mg, Oral, Daily, Dimitri Pena MD  •  fosphenytoin (Cerebyx) 921 mg PE in sodium chloride 0.9 % 100 mL IVPB, 15 mg PE/kg,  Intravenous, Once **AND** fosphenytoin (Cerebyx) injection 200 mg PE, 200 mg PE, Intravenous, Q8H, Adonis Fabian MD, 200 mg PE at 05/04/23 0856  •  glucagon (GLUCAGEN) injection 1 mg, 1 mg, Intramuscular, Q15 Min PRN, Dimitri Pena MD  •  Glycerin-Hypromellose- (ARTIFICIAL TEARS) 0.2-0.2-1 % ophthalmic solution solution 1 drop, 1 drop, Both Eyes, Q30 Min PRN, Dimitri Pena MD  •  glycopyrrolate (ROBINUL) injection 0.2 mg, 0.2 mg, Intravenous, Q2H PRN **OR** glycopyrrolate (ROBINUL) injection 0.2 mg, 0.2 mg, Subcutaneous, Q2H PRN **OR** glycopyrrolate (ROBINUL) injection 0.4 mg, 0.4 mg, Intravenous, Q2H PRN **OR** glycopyrrolate (ROBINUL) injection 0.4 mg, 0.4 mg, Subcutaneous, Q2H PRN, Dimitri Pena MD  •  hydrALAZINE (APRESOLINE) injection 10 mg, 10 mg, Intravenous, Q4H PRN, Dimitri Pena MD, 10 mg at 05/04/23 0823  •  hydrALAZINE (APRESOLINE) tablet 100 mg, 100 mg, Oral, Q8H, Dimitri Pena MD  •  HYDROmorphone (DILAUDID) injection 0.5 mg, 0.5 mg, Intravenous, Q1H PRN **OR** morphine injection 2 mg, 2 mg, Intravenous, Q1H PRN **OR** morphine concentrated solution 5 mg, 5 mg, Sublingual, Q1H PRN, Dimitri Pena MD  •  levETIRAcetam (KEPPRA) injection 1,000 mg, 1,000 mg, Intravenous, Q6H, Adonis Fabian MD, 1,000 mg at 05/04/23 1328  •  levothyroxine (SYNTHROID, LEVOTHROID) tablet 350 mcg, 350 mcg, Oral, Q AM, Dimitri Pena MD  •  LORazepam (ATIVAN) injection 0.5 mg, 0.5 mg, Intravenous, Q1H PRN **OR** LORazepam (ATIVAN) injection 0.5 mg, 0.5 mg, Subcutaneous, Q1H PRN **OR** LORazepam (ATIVAN) 2 MG/ML concentrated solution 0.5 mg, 0.5 mg, Sublingual, Q1H PRN, Dimitri Pena MD  •  LORazepam (ATIVAN) injection 2 mg, 2 mg, Intravenous, Once, Adonis Fabian MD  •  LORazepam (ATIVAN) injection 2 mg, 2 mg, Intravenous, Q2H PRN, Adonis Fabian MD, 2 mg at 05/03/23 1937  •  melatonin tablet 3 mg, 3 mg, Oral, Nightly PRN, Dimitri Pena MD  •  Menthol-Zinc Oxide 1 application, 1 application, Topical, BID, Jean,  MD Terrell  •  nystatin (MYCOSTATIN) powder, , Topical, Q12H, Terrell Pena MD  •  pantoprazole (PROTONIX) EC tablet 40 mg, 40 mg, Oral, Daily, Terrell Pena MD  •  scopolamine patch 1 mg/72 hr, 1 patch, Transdermal, Q72H PRN, Terrell Pena MD  •  sennosides-docusate (PERICOLACE) 8.6-50 MG per tablet 2 tablet, 2 tablet, Oral, Nightly PRN, Terrell Pena MD  •  [COMPLETED] Insert Peripheral IV, , , Once **AND** sodium chloride 0.9 % flush 10 mL, 10 mL, Intravenous, PRN, Aguila Canseco MD     ASSESSMENT  Status epilepticus  Recurrent seizure disorder   Metabolic encephalopathy  Acute UTI  End-stage renal disease on hemodialysis  Recent right MCA stroke  Chronic diastolic congestive heart failure  Insulin-dependent diabetes mellitus  Hypertension  Hyperlipidemia  Hypothyroidism  Chronic anemia  Gastroesophageal reflux disease    PLAN  CPM  IV Keppra  IV antibiotics  Hemodialysis today  Neurology consult pending  Nephrology to follow patient   Adjust home medications  Stress ulcer DVT prophylaxis  Supportive care  DNR with poor prognosis and palliative care consult  Discussed with  and nursing staff  Follow closely further recommendation according to hospital course    TERRELL PENA MD    Copied text in this note has been reviewed and is accurate as of 05/04/23

## 2023-05-04 NOTE — CONSULTS
Nutrition Services    Patient Name:  Zaina Martinez  YOB: 1965  MRN: [id number]  Admit Date:  5/3/2023    Assessment Date:  05/04/23    Comment: Nutrition following for nurse admission screen consult. Patient admitted for AMS and seizure, presented to ED in postictal state. Patient was also found to have UTI with metabolic encephalopathy. This is her 4th hospitalization this year alone. Patient familiar to nutrition services and this RD due to hx of mod-severe malnutrition, ESRD on HD and poor appetite. Patient currently NPO but has been known to like no[employer]ource renal ONS. UTD new dry weight due to patient being noted to need urgent HD upon arrival. Her  has stated she has been eating THC gummies to improve her appetite, which they have. Patient has been noted to be declining and may be palliative appropriate. Patient currently NPO, interview deferred until diet is advanced. Will continue to follow and monitor for GOC/POC     CLINICAL NUTRITION ASSESSMENT      Reason for Assessment Nurse Admission Screen     Diagnosis/Problem   Seizure    Medical/Surgical History Past Medical History:   Diagnosis Date   • Acute CVA (cerebrovascular accident) 05/01/2022   • Acute on chronic diastolic CHF (congestive heart failure)    • Anemia    • CAD (coronary artery disease) 12/20/2021   • Diabetes    • Disease of thyroid gland    • GERD (gastroesophageal reflux disease)    • History of intracranial hemorrhage 5/1/2022   • Hyperlipidemia 11/30/2018   • Hypertension    • Rheumatoid arthritis    • Stage 5 chronic kidney disease        Past Surgical History:   Procedure Laterality Date   • CHOLECYSTECTOMY WITH INTRAOPERATIVE CHOLANGIOGRAM N/A 1/10/2022    Procedure: Laparoscopic cholecystectomy with intraoperative cholangiogram;  Surgeon: Ramana Raygoza MD;  Location: [facility];  Service: General;  Laterality: N/A;   • CYSTOSCOPY W/ URETERAL STENT PLACEMENT Left 9/29/2022    Procedure: CYSTOSCOPY URETERAL  STENT INSERTION WITH RETROGRADE PYELOGRAM;  Surgeon: Bryant Phan Jr., MD;  Location: [facility];  Service: Urology;  Laterality: Left;   • CYSTOSCOPY W/ URETERAL STENT PLACEMENT Left 1/10/2023    Procedure: CYSTOSCOPY LEFT STENT REMOVAL;  Surgeon: Bryant Phan Jr., MD;  Location: [facility];  Service: Urology;  Laterality: Left;   • EMBOLIZATION MESENTERIC ARTERY N/A 1/1/2023    Procedure: LEFT KIDNEY EMBOLIZATION;  Surgeon: Bijan Ordoñez MD;  Location: [facility] OR 18/19;  Service: Interventional Radiology;  Laterality: N/A;   • EYE SURGERY     • FEMUR IM NAILING/RODDING Right 11/10/2022    Procedure: FEMUR INTRAMEDULLARY NAILING/RODDING;  Surgeon: Glen Pierce MD;  Location: [facility];  Service: Orthopedics;  Laterality: Right;   • HIP INTERTROCHANTERIC NAILING Right 11/10/2022    Procedure: HIP INTERTROCHANTERIC NAILING;  Surgeon: Glen Pierce MD;  Location: [facility];  Service: Orthopedics;  Laterality: Right;   • HYSTERECTOMY     • INSERT CENTRAL LINE AT BEDSIDE  12/31/2022        • INSERTION HEMODIALYSIS CATHETER N/A 12/6/2021    Procedure: HEMODIALYSIS CATHETER INSERTION;  Surgeon: Keli Salazar MD;  Location: [facility] OR 18/19;  Service: Vascular;  Laterality: N/A;   • INSERTION HEMODIALYSIS CATHETER N/A 5/3/2022    Procedure: TUNNELED CATHETER PLACEMENT;  Surgeon: Keli Salazar MD;  Location: [facility];  Service: Vascular;  Laterality: N/A;   • MUSCLE BIOPSY Left 6/15/2022    Procedure: Left quadriceps muscle biopsy;  Surgeon: Marques Ma MD;  Location: [facility];  Service: Neurosurgery;  Laterality: Left;   • URETEROSCOPY LASER LITHOTRIPSY WITH STENT INSERTION Left 12/30/2022    Procedure: CYSTOSCOPY WITH LEFT  URETEROSCOPY  LEFT STENT EXCHANGE;  Surgeon: Bryant Phan Jr., MD;  Location: [facility];  Service: Urology;  Laterality: Left;        Encounter Information        Nutrition History:    "  Food Preferences:    Supplements:    Factors Affecting Intake: Other: MARIANNE     Anthropometrics        Current Height  Current Weight  BMI kg/m2 Height: 157.5 cm (62.01\")  Weight: 61.4 kg (135 lb 5.8 oz) (05/03/23 1138)  Body mass index is 24.75 kg/m².   Adjusted BMI (if applicable)    BMI Category Normal/Healthy (18.4 - 24.9)       Admission Weight 61.4 kg (135#)        Ideal Body Weight (IBW) 50.1 kg   Adjusted IBW (if applicable)        Usual Body Weight (UBW) UTD dry wt    Weight Change/Trend Unknown       Weight History Wt Readings from Last 30 Encounters:   05/03/23 1138 61.4 kg (135 lb 5.8 oz)   03/28/23 0145 65 kg (143 lb 4.8 oz)   03/27/23 0500 63.5 kg (139 lb 15.9 oz)   03/26/23 0608 61.1 kg (134 lb 11.2 oz)   03/25/23 0613 64 kg (141 lb 1.5 oz)   03/24/23 0237 66.3 kg (146 lb 2.6 oz)   03/23/23 0552 64.9 kg (143 lb 1.3 oz)   03/20/23 1700 64.3 kg (141 lb 12.8 oz)   03/20/23 1015 59 kg (130 lb)   03/10/23 1119 57.9 kg (127 lb 10.3 oz)   03/10/23 0559 56 kg (123 lb 7.3 oz)   03/09/23 0530 61.3 kg (135 lb 2.3 oz)   03/08/23 0638 55.4 kg (122 lb 2.2 oz)   03/07/23 0636 55.3 kg (121 lb 14.6 oz)   03/06/23 0554 61.6 kg (135 lb 12.9 oz)   03/04/23 1507 58.8 kg (129 lb 10.1 oz)   03/04/23 0901 67.2 kg (148 lb 2.4 oz)   02/08/23 0645 5.2 kg (11 lb 7.4 oz)   02/04/23 2356 59.2 kg (130 lb 8.2 oz)   02/01/23 1843 59.1 kg (130 lb 3.2 oz)   02/01/23 0951 62.4 kg (137 lb 8 oz)   01/29/23 0600 54.8 kg (120 lb 13 oz)   01/28/23 0147 56.8 kg (125 lb 3.5 oz)   01/27/23 1100 48.7 kg (107 lb 5.8 oz)   01/27/23 0634 56.3 kg (124 lb 1.9 oz)   01/26/23 1200 50.2 kg (110 lb 10.7 oz)   01/26/23 0609 56.9 kg (125 lb 8 oz)   01/25/23 0504 52.1 kg (114 lb 13.8 oz)   01/24/23 0534 51.4 kg (113 lb 6.4 oz)   01/23/23 1701 51.7 kg (114 lb)   01/23/23 0629 51.8 kg (114 lb 3.2 oz)   01/22/23 0500 57.4 kg (126 lb 8.7 oz)   01/21/23 0500 56.8 kg (125 lb 3.5 oz)   01/20/23 0600 56 kg (123 lb 7.3 oz)   01/19/23 0530 61.2 kg (135 lb)   01/18/23 " 0627 63.4 kg (139 lb 12.4 oz)   01/17/23 0500 64 kg (141 lb 1.5 oz)   01/16/23 0300 63.9 kg (140 lb 14 oz)   01/12/23 1500 68.2 kg (150 lb 5.7 oz)   01/12/23 0534 69 kg (152 lb 1.9 oz)   01/11/23 1244 68.1 kg (150 lb 2.1 oz)   01/10/23 0640 73.2 kg (161 lb 6 oz)   01/09/23 0507 74.1 kg (163 lb 5.8 oz)   01/08/23 0658 71.2 kg (156 lb 15.5 oz)   01/07/23 0500 70.8 kg (156 lb 1.4 oz)   01/06/23 0610 73.3 kg (161 lb 9.6 oz)   01/05/23 0508 68.6 kg (151 lb 3.8 oz)   01/02/23 0900 65.8 kg (145 lb 1 oz)   01/01/23 0600 64.6 kg (142 lb 6.7 oz)   12/26/22 1947 59.3 kg (130 lb 12.8 oz)   12/09/22 0517 57.7 kg (127 lb 3.3 oz)   12/08/22 0549 27 kg (59 lb 8 oz)   12/04/22 1934 57.4 kg (126 lb 8.7 oz)   11/23/22 1156 57.4 kg (126 lb 8.7 oz)   11/23/22 0443 60.4 kg (133 lb 2.5 oz)   11/22/22 0606 55.6 kg (122 lb 9.2 oz)   11/21/22 1200 58.3 kg (128 lb 8.5 oz)   11/20/22 0337 61.2 kg (134 lb 14.7 oz)   11/19/22 0456 60.1 kg (132 lb 7.9 oz)   11/18/22 0458 61.3 kg (135 lb 2.3 oz)   11/17/22 0418 58.4 kg (128 lb 12 oz)   11/16/22 0400 59.7 kg (131 lb 11.2 oz)   11/16/22 0044 59.8 kg (131 lb 12.8 oz)   11/15/22 0228 55.9 kg (123 lb 3.8 oz)   11/14/22 0600 63.3 kg (139 lb 8.8 oz)   11/13/22 0722 60.5 kg (133 lb 6.1 oz)   11/13/22 0554 60.5 kg (133 lb 6.4 oz)   11/11/22 1200 59 kg (130 lb 1.1 oz)   11/11/22 0700 58.5 kg (128 lb 14.4 oz)   11/09/22 0424 57.7 kg (127 lb 3.3 oz)   11/01/22 0833 54.1 kg (119 lb 4.3 oz)   11/01/22 0643 55.8 kg (123 lb 0.3 oz)   10/31/22 0504 52.5 kg (115 lb 11.9 oz)   10/30/22 0500 56.3 kg (124 lb 1.9 oz)   10/29/22 0625 54.9 kg (121 lb 0.5 oz)   10/28/22 0500 56.8 kg (125 lb 3.5 oz)   10/27/22 0601 57.2 kg (126 lb)   10/17/22 0500 57.6 kg (126 lb 15.8 oz)   10/12/22 2204 56.6 kg (124 lb 12.5 oz)   10/06/22 0511 56.4 kg (124 lb 4.8 oz)   10/05/22 0300 57.1 kg (125 lb 14.1 oz)   10/04/22 1516 54.2 kg (119 lb 8 oz)   10/04/22 0530 58 kg (127 lb 12.8 oz)   10/03/22 0500 57.7 kg (127 lb 3.3 oz)   10/02/22 0556  56.2 kg (123 lb 14.4 oz)   10/01/22 0500 55.2 kg (121 lb 12.8 oz)   09/30/22 1114 61 kg (134 lb 7.7 oz)   09/30/22 0551 64.5 kg (142 lb 3.2 oz)   09/29/22 0513 63 kg (138 lb 14.2 oz)   09/28/22 0615 59.8 kg (131 lb 13.4 oz)   09/27/22 1455 52.8 kg (116 lb 6.5 oz)   09/27/22 0600 56.3 kg (124 lb 1.9 oz)   09/27/22 0503 56.3 kg (124 lb 1.9 oz)   09/26/22 2305 59 kg (130 lb)   09/13/22 0500 60 kg (132 lb 4.4 oz)   09/11/22 0548 59.8 kg (131 lb 14.4 oz)   09/10/22 0530 56.7 kg (125 lb 1.6 oz)   09/09/22 0500 55.4 kg (122 lb 1.6 oz)   09/08/22 0511 59.5 kg (131 lb 3.2 oz)   09/07/22 1230 54.5 kg (120 lb 2.4 oz)   09/07/22 0640 56.2 kg (124 lb)   09/06/22 0539 55.5 kg (122 lb 6.4 oz)   09/05/22 0505 58.4 kg (128 lb 12.8 oz)   09/04/22 0500 62.6 kg (138 lb 1.6 oz)   09/03/22 1708 64.9 kg (143 lb 1.3 oz)   09/03/22 1355 64.9 kg (143 lb 1.3 oz)   08/27/22 0527 64.9 kg (143 lb 1.6 oz)   08/26/22 0942 64.9 kg (143 lb)   08/26/22 0500 66.6 kg (146 lb 12.8 oz)   08/25/22 0810 67 kg (147 lb 11.3 oz)   08/25/22 0500 67.1 kg (147 lb 14.4 oz)   08/24/22 0518 67.7 kg (149 lb 4 oz)   08/23/22 0526 66.8 kg (147 lb 4.3 oz)   08/22/22 0521 67.5 kg (148 lb 14.4 oz)   08/21/22 0846 65.3 kg (144 lb)   08/21/22 0350 65.4 kg (144 lb 3.2 oz)   08/20/22 0516 65 kg (143 lb 3.2 oz)   08/04/22 0511 65.1 kg (143 lb 8.3 oz)   08/03/22 0504 66.8 kg (147 lb 4.3 oz)   08/02/22 0602 65.4 kg (144 lb 2.9 oz)   08/01/22 1557 62.5 kg (137 lb 12.6 oz)   08/01/22 0700 68.3 kg (150 lb 9.2 oz)   07/31/22 0500 64.3 kg (141 lb 12.1 oz)   07/30/22 0638 65.2 kg (143 lb 11.8 oz)   07/29/22 1532 66.7 kg (147 lb)   07/29/22 0548 66.7 kg (147 lb 0.8 oz)   07/28/22 0610 65 kg (143 lb 4.8 oz)   07/27/22 2055 65.6 kg (144 lb 9.6 oz)   07/22/22 1542 66.3 kg (146 lb 2.6 oz)   07/22/22 0632 69.3 kg (152 lb 12.5 oz)   07/21/22 0539 69.7 kg (153 lb 10.6 oz)   07/18/22 0842 71.1 kg (156 lb 11.2 oz)   07/17/22 0549 68.6 kg (151 lb 3.8 oz)   07/16/22 0633 67 kg (147 lb 12.8 oz)    07/14/22 1738 69.9 kg (154 lb)   07/13/22 1041 61.7 kg (136 lb 1.6 oz)   07/04/22 1756 75.5 kg (166 lb 7.2 oz)   07/01/22 1700 73.9 kg (163 lb)   06/15/22 1100 62.5 kg (137 lb 12.6 oz)   06/12/22 0743 66.2 kg (145 lb 15.1 oz)   05/26/22 1700 64.9 kg (143 lb 1.3 oz)   05/24/22 1726 64.5 kg (142 lb 3.2 oz)   05/16/22 0817 54.2 kg (119 lb 7.8 oz)   05/13/22 1911 54.2 kg (119 lb 7.8 oz)   05/13/22 1300 51 kg (112 lb 7 oz)   05/13/22 0700 54.7 kg (120 lb 8 oz)   05/12/22 0514 55.9 kg (123 lb 3.8 oz)   05/11/22 0535 54.1 kg (119 lb 4.3 oz)   05/10/22 0512 52.9 kg (116 lb 9.6 oz)   05/09/22 0535 56.3 kg (124 lb 3.2 oz)   05/08/22 0614 54.7 kg (120 lb 9.5 oz)   05/07/22 0552 48.2 kg (106 lb 4.2 oz)   05/06/22 0500 46 kg (101 lb 6.4 oz)   05/05/22 1154 46.2 kg (101 lb 13.6 oz)   05/05/22 0647 54.5 kg (120 lb 2.4 oz)   05/04/22 0300 57.2 kg (126 lb 1.7 oz)   05/03/22 0520 59.9 kg (132 lb 0.9 oz)   05/01/22 1135 57.8 kg (127 lb 6.8 oz)   05/01/22 0600 57.8 kg (127 lb 6.8 oz)   04/29/22 1900 50.3 kg (110 lb 14.3 oz)   04/29/22 0519 50.3 kg (110 lb 12.8 oz)   04/28/22 0558 55.9 kg (123 lb 3.8 oz)   04/28/22 0427 54.8 kg (120 lb 13 oz)   04/28/22 1511 55.8 kg (123 lb)   03/02/22 1615 62.4 kg (137 lb 9.1 oz)   03/02/22 0020 64.4 kg (141 lb 15.6 oz)   03/01/22 0434 62 kg (136 lb 11.2 oz)   02/28/22 0413 62.2 kg (137 lb 3.2 oz)   02/27/22 0644 64.1 kg (141 lb 5 oz)   02/26/22 0500 65.1 kg (143 lb 8.3 oz)   02/25/22 0500 65.4 kg (144 lb 2.9 oz)   02/24/22 0500 65.9 kg (145 lb 4.5 oz)   02/23/22 2300 64.9 kg (143 lb 1.3 oz)   01/24/22 0814 71.4 kg (157 lb 6.4 oz)   01/15/22 0500 71.2 kg (156 lb 15.5 oz)   01/14/22 0500 69.6 kg (153 lb 7 oz)   01/13/22 0500 69.7 kg (153 lb 10.6 oz)   01/12/22 0500 69.9 kg (154 lb 1.6 oz)   01/11/22 0500 86.3 kg (190 lb 4.1 oz)   01/10/22 0700 82.3 kg (181 lb 8 oz)   01/09/22 1808 77.6 kg (171 lb)   01/09/22 1006 79.4 kg (175 lb)   12/29/21 0508 71.8 kg (158 lb 3.2 oz)   12/28/21 1251 73.7 kg (162 lb  7.7 oz)   12/28/21 0513 73.7 kg (162 lb 7.7 oz)   12/27/21 0500 67.7 kg (149 lb 3.2 oz)   12/26/21 1053 70.7 kg (155 lb 13.8 oz)   12/26/21 0500 73.8 kg (162 lb 9.6 oz)   12/25/21 0457 72.3 kg (159 lb 4.8 oz)   12/24/21 0500 72.6 kg (160 lb 1.6 oz)   12/23/21 0500 75.9 kg (167 lb 6.4 oz)   12/22/21 0500 73.9 kg (163 lb)   12/20/21 1100 70.3 kg (155 lb)   12/19/21 1338 69.9 kg (154 lb)   12/17/21 0449 69.9 kg (154 lb)   12/16/21 1300 70.2 kg (154 lb 12.2 oz)   12/16/21 0306 72.3 kg (159 lb 4.8 oz)   12/15/21 1531 70.2 kg (154 lb 12.8 oz)   12/01/21 2300 61.2 kg (135 lb)   11/30/21 1252 59 kg (130 lb)   11/30/21 0200 59 kg (130 lb)           --  Estimated/Assessed Needs       Energy Requirements    Weight for Calculation 61.4 kg (135#)    Method for Estimation  25 kcal/kg, 30 kcal/kg   EST Needs (kcal/day) 4954-8012       Protein Requirements    Weight for Calculation 61.4 kg (135#)    EST Protein Needs (g/kg) 1.3 gm/kg, 1.4 gm/kg   EST Daily Needs (g/day) 80-86       Fluid Requirements     Method for Estimation 1 mL/kcal    Estimated Needs (mL/day) 7289-1614     Tests/Procedures        Tests/Procedures CT scan, X-Ray     Labs       Pertinent Labs    Results from last 7 days   Lab Units 05/04/23  0543 05/03/23  2039 05/03/23  1527 05/03/23  1205   SODIUM mmol/L 135*  --  137 132*   POTASSIUM mmol/L 5.3* 6.0* 5.5* 6.0*   CHLORIDE mmol/L 98  --  105 97*   CO2 mmol/L 25.0  --  25.3 30.0*   BUN mg/dL 25*  --  19 22*   CREATININE mg/dL 2.48*  --  1.80* 2.07*   CALCIUM mg/dL 8.3*  --  7.3* 8.4*   BILIRUBIN mg/dL 0.4  --   --  0.5   ALK PHOS U/L 106  --   --  133*   ALT (SGPT) U/L 10  --   --  8   AST (SGOT) U/L 34*  --   --  28   GLUCOSE mg/dL 53*  --  120* 85     Results from last 7 days   Lab Units 05/04/23  0543 05/03/23  1205   MAGNESIUM mg/dL  --  1.7   PHOSPHORUS mg/dL 1.5*  --    HEMOGLOBIN g/dL 8.5* 10.0*   HEMATOCRIT % 25.5* 30.8*   WBC 10*3/mm3 17.99* 7.71   TRIGLYCERIDES mg/dL 143  --    ALBUMIN g/dL 2.6* 3.0*      Results from last 7 days   Lab Units 05/04/23  0543 05/03/23  1205   INR   --  0.94   PLATELETS 10*3/mm3 116* 104*     COVID19   Date Value Ref Range Status   05/03/2023 Not Detected Not Detected - Ref. Range Final     Lab Results   Component Value Date    HGBA1C 4.60 (L) 05/04/2023          Medications           Scheduled Medications amLODIPine, 10 mg, Oral, Q24H  aspirin, 81 mg, Oral, Daily  carvedilol, 3.125 mg, Oral, BID With Meals  cefTRIAXone, 1 g, Intravenous, Q24H  Desvenlafaxine Succinate ER, 25 mg, Oral, Daily  folic acid, 1 mg, Oral, Daily  fosphenytoin, 15 mg PE/kg, Intravenous, Once   And  fosphenytoin, 200 mg PE, Intravenous, Q8H  hydrALAZINE, 100 mg, Oral, Q8H  levETIRAcetam, 1,000 mg, Intravenous, Q6H  levothyroxine, 350 mcg, Oral, Q AM  LORazepam, 2 mg, Intravenous, Once  pantoprazole, 40 mg, Oral, Daily  rOPINIRole, 2 mg, Oral, Nightly  sodium phosphate IVPB, 15 mmol, Intravenous, Once       Infusions     PRN Medications •  acetaminophen  •  albuterol  •  dextrose  •  dextrose  •  glucagon (human recombinant)  •  hydrALAZINE  •  LORazepam  •  melatonin  •  sennosides-docusate  •  [COMPLETED] Insert Peripheral IV **AND** sodium chloride     Physical Findings          Physical Appearance nonverbal, somnolent   Oral/Mouth Cavity tooth or teeth missing   Edema  generalized, 2+ (mild)   Gastrointestinal fecal incontinence, last bowel movement:   Skin  other: potential coccyx PI and MASD    Tubes/Drains/Lines tunneled dialysis catheter   NFPE Unable to perform due to:  pt NPO, did not visit    --  Current Nutrition Orders & Evaluation of Intake       Oral Nutrition     Food Allergies NKFA   Current PO Diet NPO Diet NPO Type: Strict NPO   Supplement n/a   PO Evaluation     % PO Intake     # of Days Evaluated    --  PES STATEMENT / NUTRITION DIAGNOSIS      Nutrition Dx Problem  Problem: Predicted Suboptimal Intake  Etiology: Medical Diagnosis seizure/unresponsive   Signs/Symptoms: NPO    Comment:     --  NUTRITION INTERVENTION / PLAN OF CARE      Intervention Goal(s) Reduce/improve symptoms, Meet estimated needs, Disease management/therapy, Tolerate PO , Advance diet and Maintain weight         RD Intervention/Action Follow Tx Progress and Care plan reviewed         Prescription/Orders:       PO Diet       Supplements       Snacks       Enteral Nutrition       Parenteral Nutrition    New Prescription Ordered? No changes at this time   --      Monitor/Evaluation Per protocol   Discharge Plan/Needs Pending clinical course   Education Education not appropriate at this time   --    RD to follow per protocol.      Electronically signed by:  Radha Pinzon RD  05/04/23 10:43 EDT

## 2023-05-04 NOTE — PROGRESS NOTES
Discharge Planning Assessment  [facility]     Patient Name: Zaina Martinez  MRN: [id number]  Today's Date: 5/4/2023    Admit Date: 5/3/2023        Discharge Needs Assessment    No documentation.                Discharge Plan     Row Name 05/04/23 1550       Plan    Plan Comments The patient transferred to [facility] from [address] [place] [address] on 5/4/23. The patient is palliative and [facility] to evaluate. CCP will continue to follow for any needs that may arise. AVINASH Leblanc RN, CCP              Continued Care and Services - Admitted Since 5/3/2023    Coordination has not been started for this encounter.     Selected Continued Care - Prior Encounters Includes continued care and service providers with selected services from prior encounters from 2/2/2023 to 5/4/2023    Discharged on 3/11/2023 Admission date: 3/4/2023 - Discharge disposition: Rehab Facility or Unit (DC - External)    Destination     Service Provider Selected Services Address Phone Fax Patient Preferred    [facility] [address] [place] [id number] [phone] --                Discharged on 2/11/2023 Admission date: 2/1/2023 - Discharge disposition: Skilled Nursing Facility (DC - External)    Destination     Service Provider Selected Services Address Phone Fax Patient Preferred    [facility] [address] [address] [phone] --                       Demographic Summary    No documentation.                Functional Status    No documentation.                Psychosocial    No documentation.                Abuse/Neglect    No documentation.                Legal    No documentation.                Substance Abuse    No documentation.                Patient Forms    No documentation.                   Anu Leblanc RN

## 2023-05-05 NOTE — PLAN OF CARE
Goal Outcome Evaluation:  Plan of Care Reviewed With: spouse, family        Progress: declining  Outcome Evaluation: Medicated x1 with morphine for signs of pain/discomfort. Pt has appeared restful and comfortable since then. Spouse and other family members at bedside through the night. IV cerebyx and keppra given as ordered. Will continue to monitor for comfort.

## 2023-05-05 NOTE — PROGRESS NOTES
All notes are noted.  She is now transferred to palliative care and neurology will follow-up as needed reconsult.  Thanks  Adonis Fabian MD

## 2023-05-05 NOTE — PROGRESS NOTES
Nephrology    Patient transferred to palliative care unit for comfort-based care  She looks comfortable;  at bedside  No further dialysis  Discussed with  and other family members    --Alejo Lange MD

## 2023-05-05 NOTE — PLAN OF CARE
Goal Outcome Evaluation:  Plan of Care Reviewed With: patient, spouse, family        Progress: declining  Outcome Evaluation: PPS 10%. x1 robinul given. Pt has appeared to be resting comfortably throughout shift.  x1 2mg morphine given. x2 0.4 robinul given. IV cerebryx and keppra given as ordered.  at bedside. Pt is anueric. Will cont comfort measures.

## 2023-05-05 NOTE — PROGRESS NOTES
Palliative Social Work Note    Purpose of visit: Follow up  Assessment: Patient unresponsive, appears comfortable, PPS: 10%.   Support System: Present at bedside, Involved in care, Family, Spouse and Children  Psychosocial Needs Identified: Caregiver support, Grief and  planning  Plan/Other Comments:   Spoke to spouse and dgtr as well as other family at bedside. Family processing through grief and difficult life stressors. Family inquiring about resources for  expenses, provided information and family spoke to [facility] for additional resources. Completed memory making and advised of bereavement and grief support resources.       Social Work Assessment Tool (SWAT) for Palliative Care Patients and Caregivers          How well are patient and/or primary caregiver doing?    1              2          3               4                 5  Not well  Not too Neutral  Reasonably  Extremely     at all         well                      well              well                          Issue:               Patient                  Primary               Caregiver    1.End of life decisions consistent with their religous and cultural norms.   6 N/A   5 Extremely well   2. Patient thoughts of suicide or wanting to hasten death.  6 N/A 5 Extremely well   3. Anxiety about death    6 N/A 5 Extremely well   4. Preferences about environment (e.g., pets, own bed etc.) 6 N/A 5 Extremely well   5. Social support    6 N/A 5 Extremely well   6. Financial resources 6 N/A 4 Reasonably well   7. Safety issues 6 N/A 5 Extremely well   8. Comfort issues 6 N/A 5 Extremely well   9. Complicated anticipatory grief (e.g., guilt, depression, etc.) 6 N/A 4 Reasonably well   10. Awareness of prognosis 6 N/A 5 Extremely well   11. Spirituality  (e.g., higher purpose in life, sense of connection with all)  6 N/A 5 Extremely well    Total Score  53

## 2023-05-05 NOTE — PROGRESS NOTES
"Daily progress note    Primary care physician      Chief complaint  Comfortable with no new issues and family at bedside    History of present illness  57-year-old white female with very complex past medical history and well-known to our service including CVA end-stage renal disease congestive heart failure hypertension hyperlipidemia hypothyroidism and seizure disorder brought to the emergency room by the  with worsening mental status and evaluated in ER found to have postictal state admit for management.  Patient also found to have UTI with metabolic encephalopathy.  Patient is DNR per her wishes.  Patient is unable to give detailed history most of the history obtained per chart nursing staff old record and .  Patient has no fever chills chest pain shortness of breath and has not missed any hemodialysis.     REVIEW OF SYSTEMS  Unable to obtain    PHYSICAL EXAM  Blood pressure 160/67, pulse 96, temperature (!) 101.2 °F (38.4 °C), temperature source Oral, resp. rate 12, height 157.5 cm (62.01\"), weight 61.4 kg (135 lb 5.8 oz), SpO2 98 %, not currently breastfeeding.    GENERAL:  Restless confused but no respiratory distress   HENT: NCAT: nares patent: Neck supple  EYES: no scleral icterus  CV: regular rhythm, normal rate  RESPIRATORY: normal effort  ABDOMEN: soft, NTND: Bowel sounds positive  MUSCULOSKELETAL: no deformity  NEURO: alert with nonfocal neuro exam  PSYCH:  calm, cooperative  SKIN: warm, dry     LAB RESULTS  Lab Results (last 24 hours)     Procedure Component Value Units Date/Time    Blood Culture - Blood, Hand, Left [[id number]]  (Normal) Collected: 05/03/23 2039    Specimen: Blood from Hand, Left Updated: 05/04/23 2115     Blood Culture No growth at 24 hours    Urine Culture - Urine, Straight Cath [[id number]]  (Normal) Collected: 05/03/23 1212    Specimen: Urine from Straight Cath Updated: 05/04/23 2025     Urine Culture No growth    Blood Culture - Blood, Arm, Right [[id number]]  " (Normal) Collected: 05/03/23 1901    Specimen: Blood from Arm, Right Updated: 05/04/23 1915     Blood Culture No growth at 24 hours        Imaging Results (Last 24 Hours)     ** No results found for the last 24 hours. **        ECG 12 Lead             Component  Ref Range & Units 11:53  (5/3/23) 1 mo ago  (3/20/23) 2 mo ago  (2/5/23) 3 mo ago  (1/22/23) 3 mo ago  (1/22/23) 4 mo ago  (1/1/23) 4 mo ago  (12/26/22)   QT Interval  ms 424  415  418  391  372  397  514    Resulting Agency BH ECG BH ECG BH ECG BH ECG BH ECG  ECG BH ECG             HEART RATE= 72  bpm  RR Interval= 833  ms  AR Interval= 182  ms  P Horizontal Axis= 17  deg  P Front Axis= 73  deg  QRSD Interval= 100  ms  QT Interval= 424  ms  QRS Axis= -45  deg  T Wave Axis= 60  deg  - ABNORMAL ECG -  Sinus rhythm  Left anterior fascicular block  Borderline low voltage, extremity leads  Nonspecific T abnormalities, lateral leads  When compared with ECG of 20-Mar-2023 11:12:37,  No significant change              Current Facility-Administered Medications:   •  acetaminophen (TYLENOL) tablet 650 mg, 650 mg, Oral, Q4H PRN **OR** acetaminophen (TYLENOL) 160 MG/5ML solution 650 mg, 650 mg, Oral, Q4H PRN **OR** acetaminophen (TYLENOL) suppository 650 mg, 650 mg, Rectal, Q4H PRN, Dimitri Pena MD  •  acetaminophen (TYLENOL) tablet 650 mg, 650 mg, Oral, Q6H PRN, Dimitri Pena MD  •  albuterol (PROVENTIL) nebulizer solution 0.5% 2.5 mg/0.5mL, 10 mg, Nebulization, Q4H PRN, Dimitri Pena MD  •  dextrose (D50W) (25 g/50 mL) IV injection 25 g, 25 g, Intravenous, Q15 Min PRN, Dimitri Pena MD, 25 g at 05/04/23 0820  •  dextrose (GLUTOSE) oral gel 15 g, 15 g, Oral, Q15 Min PRN, Dimitri Pena MD  •  glucagon (GLUCAGEN) injection 1 mg, 1 mg, Intramuscular, Q15 Min PRN, Dimitri Pena MD  •  Glycerin-Hypromellose- (ARTIFICIAL TEARS) 0.2-0.2-1 % ophthalmic solution solution 1 drop, 1 drop, Both Eyes, Q30 Min PRN, Dimitri Pena MD  •  glycopyrrolate (ROBINUL) injection  0.2 mg, 0.2 mg, Intravenous, Q2H PRN **OR** glycopyrrolate (ROBINUL) injection 0.2 mg, 0.2 mg, Subcutaneous, Q2H PRN **OR** glycopyrrolate (ROBINUL) injection 0.4 mg, 0.4 mg, Intravenous, Q2H PRN, 0.4 mg at 05/05/23 1442 **OR** glycopyrrolate (ROBINUL) injection 0.4 mg, 0.4 mg, Subcutaneous, Q2H PRN, Terrell Pena MD  •  hydrALAZINE (APRESOLINE) injection 10 mg, 10 mg, Intravenous, Q4H PRN, Terrell Pena MD, 10 mg at 05/04/23 0823  •  HYDROmorphone (DILAUDID) injection 0.5 mg, 0.5 mg, Intravenous, Q1H PRN **OR** morphine injection 2 mg, 2 mg, Intravenous, Q1H PRN, 2 mg at 05/05/23 0640 **OR** morphine concentrated solution 5 mg, 5 mg, Sublingual, Q1H PRN, Terrell Pena MD  •  LORazepam (ATIVAN) injection 0.5 mg, 0.5 mg, Intravenous, Q1H PRN **OR** LORazepam (ATIVAN) injection 0.5 mg, 0.5 mg, Subcutaneous, Q1H PRN **OR** LORazepam (ATIVAN) 2 MG/ML concentrated solution 0.5 mg, 0.5 mg, Sublingual, Q1H PRN, Terrell Pena MD  •  LORazepam (ATIVAN) injection 2 mg, 2 mg, Intravenous, Q2H PRN, Adonis Fabian MD, 2 mg at 05/03/23 1937  •  melatonin tablet 3 mg, 3 mg, Oral, Nightly PRN, Terrell Pena MD  •  scopolamine patch 1 mg/72 hr, 1 patch, Transdermal, Q72H PRN, Terrell Pnea MD  •  sennosides-docusate (PERICOLACE) 8.6-50 MG per tablet 2 tablet, 2 tablet, Oral, Nightly PRN, Terrell Pena MD     ASSESSMENT  Status epilepticus  Recurrent seizure disorder   Metabolic encephalopathy  Acute UTI  End-stage renal disease on hemodialysis  Recent right MCA stroke  Chronic diastolic congestive heart failure  Insulin-dependent diabetes mellitus  Hypertension  Hyperlipidemia  Hypothyroidism  Chronic anemia  Gastroesophageal reflux disease    PLAN  After multiple discussion with patient family including her  about her diagnosis prognosis and possible outcome they decided to withdraw the care and keep patient comfortable in palliative care unit and allow natural death.  I agree with their decision and will comply.    TERRELL  MOOK BRAMBILA    Copied text in this note has been reviewed and is accurate as of 05/05/23

## 2023-05-06 NOTE — PLAN OF CARE
Goal Outcome Evaluation:  Plan of Care Reviewed With: patient, spouse        Progress: declining  Outcome Evaluation: Pt. continued with palliative care.  Premedicating the patient before turns with 0.4mg of Robinul and 2mg of morphine for congestion, breathing, and comfort with moving.  Family at bedside throughout the day.  Will continue to monitor.

## 2023-05-06 NOTE — PROGRESS NOTES
"Daily progress note    Primary care physician      Chief complaint  Comfortable with no new issues and family at bedside    History of present illness  57-year-old white female with very complex past medical history and well-known to our service including CVA end-stage renal disease congestive heart failure hypertension hyperlipidemia hypothyroidism and seizure disorder brought to the emergency room by the  with worsening mental status and evaluated in ER found to have postictal state admit for management.  Patient also found to have UTI with metabolic encephalopathy.  Patient is DNR per her wishes.  Patient is unable to give detailed history most of the history obtained per chart nursing staff old record and .  Patient has no fever chills chest pain shortness of breath and has not missed any hemodialysis.     REVIEW OF SYSTEMS  Unable to obtain    PHYSICAL EXAM  Blood pressure 137/63, pulse 75, temperature 97.7 °F (36.5 °C), temperature source Axillary, resp. rate 16, height 157.5 cm (62.01\"), weight 61.4 kg (135 lb 5.8 oz), SpO2 94 %, not currently breastfeeding.    Unchanged     LAB RESULTS  Lab Results (last 24 hours)     Procedure Component Value Units Date/Time    Blood Culture - Blood, Hand, Left [[id number]]  (Normal) Collected: 05/03/23 2039    Specimen: Blood from Hand, Left Updated: 05/05/23 2115     Blood Culture No growth at 2 days    Blood Culture - Blood, Arm, Right [[id number]]  (Normal) Collected: 05/03/23 1901    Specimen: Blood from Arm, Right Updated: 05/05/23 1915     Blood Culture No growth at 2 days        Imaging Results (Last 24 Hours)     ** No results found for the last 24 hours. **             Current Facility-Administered Medications:   •  acetaminophen (TYLENOL) tablet 650 mg, 650 mg, Oral, Q4H PRN **OR** acetaminophen (TYLENOL) 160 MG/5ML solution 650 mg, 650 mg, Oral, Q4H PRN **OR** acetaminophen (TYLENOL) suppository 650 mg, 650 mg, Rectal, Q4H PRN, Dimitri Pena, " MD  •  acetaminophen (TYLENOL) tablet 650 mg, 650 mg, Oral, Q6H PRN, Dimitri Pena MD  •  albuterol (PROVENTIL) nebulizer solution 0.5% 2.5 mg/0.5mL, 10 mg, Nebulization, Q4H PRN, Dimitri Pena MD  •  dextrose (D50W) (25 g/50 mL) IV injection 25 g, 25 g, Intravenous, Q15 Min PRN, Dimitri Pena MD, 25 g at 05/04/23 0820  •  dextrose (GLUTOSE) oral gel 15 g, 15 g, Oral, Q15 Min PRN, Dimitri Pena MD  •  glucagon (GLUCAGEN) injection 1 mg, 1 mg, Intramuscular, Q15 Min PRN, Dimitri Pena MD  •  Glycerin-Hypromellose- (ARTIFICIAL TEARS) 0.2-0.2-1 % ophthalmic solution solution 1 drop, 1 drop, Both Eyes, Q30 Min PRN, Dimitri Pena MD  •  glycopyrrolate (ROBINUL) injection 0.2 mg, 0.2 mg, Intravenous, Q2H PRN **OR** glycopyrrolate (ROBINUL) injection 0.2 mg, 0.2 mg, Subcutaneous, Q2H PRN **OR** glycopyrrolate (ROBINUL) injection 0.4 mg, 0.4 mg, Intravenous, Q2H PRN, 0.4 mg at 05/06/23 1321 **OR** glycopyrrolate (ROBINUL) injection 0.4 mg, 0.4 mg, Subcutaneous, Q2H PRN, Dimitri Pena MD  •  hydrALAZINE (APRESOLINE) injection 10 mg, 10 mg, Intravenous, Q4H PRN, Dimitri Pena MD, 10 mg at 05/04/23 0823  •  HYDROmorphone (DILAUDID) injection 0.5 mg, 0.5 mg, Intravenous, Q1H PRN **OR** morphine injection 2 mg, 2 mg, Intravenous, Q1H PRN, 2 mg at 05/06/23 1321 **OR** morphine concentrated solution 5 mg, 5 mg, Sublingual, Q1H PRN, Dimitri Pena MD  •  LORazepam (ATIVAN) injection 0.5 mg, 0.5 mg, Intravenous, Q1H PRN **OR** LORazepam (ATIVAN) injection 0.5 mg, 0.5 mg, Subcutaneous, Q1H PRN **OR** LORazepam (ATIVAN) 2 MG/ML concentrated solution 0.5 mg, 0.5 mg, Sublingual, Q1H PRN, Dimitri Pena MD  •  LORazepam (ATIVAN) injection 2 mg, 2 mg, Intravenous, Q2H PRN, Adonis Fabian MD, 2 mg at 05/03/23 1937  •  melatonin tablet 3 mg, 3 mg, Oral, Nightly PRN, Dimitri Pena MD  •  scopolamine patch 1 mg/72 hr, 1 patch, Transdermal, Q72H PRN, Dimitri Pena MD, 1 patch at 05/06/23 0917  •  sennosides-docusate (PERICOLACE) 8.6-50  MG per tablet 2 tablet, 2 tablet, Oral, Nightly PRN, Terrell Pena MD     ASSESSMENT  Status epilepticus  Recurrent seizure disorder   Metabolic encephalopathy  Acute UTI  End-stage renal disease on hemodialysis  Recent right MCA stroke  Chronic diastolic congestive heart failure  Insulin-dependent diabetes mellitus  Hypertension  Hyperlipidemia  Hypothyroidism  Chronic anemia  Gastroesophageal reflux disease    PLAN  Comfort care only  Allow natural death  Routine palliative care orders  Discussed with family  Discussed with nursing staff    TERRELL PENA MD    Copied text in this note has been reviewed and is accurate as of 05/06/23

## 2023-05-06 NOTE — PLAN OF CARE
Goal Outcome Evaluation:    Medicated with Dilaudid  for signs of pain/discomfort.  Robinol given for secretions.  Pt has appeared restful and comfortable. Spouse and other family members at bedside through the night.Will continue to monitor for comfort.

## 2023-05-07 NOTE — DISCHARGE SUMMARY
Discharge summary    Date of admission 5/3/2023  Date of death 2023    Discussion  57-year-old white female with multiple medical problems and well-known to our service including seizure disorder end-stage renal disease congestive heart failure hypertension hyperlipidemia hypothyroidism seizure disorder admitted to emergency room with altered mental status and found to be in postictal state with metabolic encephalopathy and also found to have UTI.  Patient admitted and provided supportive care and received antiseizure medication including Keppra and followed by neurology and nephrology.  Patient remain in status epilepticus and family decided to stop all medication as she has been through a lot for last several years with no quality of life and keep her comfortable in palliative care unit.  Patient transferred to palliative care unit for comfort care and remain comfortable and  this morning.  Patient was DNR and her CODE STATUS changed to allow natural death.    Cause of death cardiopulmonary failure    TERRELL DELVALLE MD

## 2023-05-07 NOTE — PLAN OF CARE
Problem: Adult Inpatient Plan of Care  Goal: Plan of Care Review  Outcome: Ongoing, Progressing  Flowsheets (Taken 5/7/2023 0434)  Progress: declining  Outcome Evaluation: breathing shallow and labored and very moist, non responsive, comfort measures continued, premedicated with Robinul 0.4 mg and Morphine 2 mg, prior to repositioning, oral care and oral suctioning done, very small amount of oral secretions noted,  stayed at bedside, will continue comfort care   Goal Outcome Evaluation:           Progress: declining  Outcome Evaluation: breathing shallow and labored and very moist, non responsive, comfort measures continued, premedicated with Robinul 0.4 mg and Morphine 2 mg, prior to repositioning, oral care and oral suctioning done, very small amount of oral secretions noted,  stayed at bedside, will continue comfort care

## 2023-05-07 NOTE — NURSING NOTE
Called Nephrology for low 1.5 Phos spoke with Dr. Lange order for 15 mm Sodium Phosphate IV  
Dr. Pena called again at this time for hypoglycemic protocol orders  
Eye prep just in case possible donation  
HD tx complete and net UF of 2 litres removed    B/p 111/63  Hr 99  Temp 98.1  
New orders received from Dr. Fabian. Gave stat dose of ativan 2 mg and keppra 1500 stat no iv.   
Paged Dr. Pena due to patients blood sugar of 53 and needing hypoglycemic orders.   
Rapid response called due to pt seems to be continuously having seizures. Pt is bleeding from mouth, has saliva coming from mouth and moving at all times. Pt can not communicate at all. She is not responding to painful stimuli as well. Family at bedside. Called Dr. Pena due to Neurology not returning call back yet as of this time. Dr. Pena stated he placed order for neurology and he placed code status orders as well and further treatment for any neurological issues need to be addressed by neurology per Dr. Pena. Notified rapid response team as well of Dr. Pena call. Rapid response team wanted to take pt to icu for further treatment however pt is a drn/dni at family does not want pt intubated.   
Wound/Ostomy: We see paciente by floor nurse request about skin issue on Coccyx and left Breast fold.    state that he prefers to leave her  without moving, she is being connected to an EEG test., what she  has in her  back is superficial, although new and present at the admission. We review the photo, we could see red discoloration, that could be related to moisture and/or friction.  Wound care and pressure ulcer prevention measure placed into Epic.   Low air loss mattress  for adequate pressure redistribution and pressure relief from Agility and frame from EVS is requested.  In addition rash is observed on left Brest fold, Nystatin powder ordered.  Rn notified.  Please re-consult for any additional needs.  
no

## 2023-05-08 LAB
BACTERIA SPEC AEROBE CULT: NORMAL
BACTERIA SPEC AEROBE CULT: NORMAL

## 2023-05-08 NOTE — CASE MANAGEMENT/SOCIAL WORK
Case Management Discharge Note      Final Note: Patient passed away on 2023 at 06:04 AM.         Selected Continued Care - Discharged on 2023 Admission date: 5/3/2023 - Discharge disposition:     Destination    No services have been selected for the patient.              Durable Medical Equipment    No services have been selected for the patient.              Dialysis/Infusion    No services have been selected for the patient.              Home Medical Care    No services have been selected for the patient.              Therapy    No services have been selected for the patient.              Community Resources    No services have been selected for the patient.              Community & DME    No services have been selected for the patient.                Selected Continued Care - Prior Encounters Includes continued care and service providers with selected services from prior encounters from 2023 to 2023    Discharged on 3/11/2023 Admission date: 3/4/2023 - Discharge disposition: Rehab Facility or Unit (DC - External)    Destination     Service Provider Selected Services Address Phone Fax Patient Preferred    [facility] [address] [place] [id number] [phone] --                Discharged on 2023 Admission date: 2023 - Discharge disposition: Skilled Nursing Facility (DC - External)    Destination     Service Provider Selected Services Address Phone Fax Patient Preferred    [facility] [address] [place] [phone] --                         Final Discharge Disposition Code: 41 -  in medical facility

## 2024-06-05 NOTE — PLAN OF CARE
Goal Outcome Evaluation:  Plan of Care Reviewed With: patient           Outcome Evaluation: Patient seen for clinical swallow assessment. Pt confused, agitated, nearly pulled out cortrak upon SLP's arrival despite restraints. Not oriented to name. Would not follow commands. Able to repeat at times, question echolalia. Speech often fluent, non sensical. Pt refused all PO, but did accept thins via straw when presented to oral cavity. Voice clear post swallow. Laryngeal elevation adequate. Pt turned head from all trials of puree. Pt did attempt to bite a cracker once, but unable to coordinate biting through the cracker this date. Question some oral apraxia, confusion vs refusal. Patient appears safe for thins. Can allow at this time. Doubt patient will have adequate intake d/t refusals, will defer diet entry to team.     Patient was not wearing a face mask during this therapy encounter. Therapist used appropriate personal protective equipment including mask, eye protection and gloves.  Mask used was standard procedure mask. Appropriate PPE was worn during the entire therapy session. Hand hygiene was completed before and after therapy session. Patient is not in enhanced droplet precautions.                gi bleed  afib  heart failure  dvt    hgb is stable  cont reg diet  cbc stable  no objection to cont a/c  d/w patient

## 2025-03-31 NOTE — PROGRESS NOTES
Chief Complaint(s) and History of Present Illness(es)       Thyroid Disease              Comments: Ran out of methimazole in February, did not take it for 2-3 wks. Labs showed that thyroid was overactive, so restarted methimazole 5mg daily. Pt notes that proptosis had been improving before he ran out of methimazole. Then proptosis worsened while off medication. No diplopia. Tearing of eyes only when windy. + mild itching. Puffy eyelids on waking up.    2/21/25  Free T4 2.20 High   T3 Total 182  TSH 0.01  Inf: pt                   The patient seems improved today.  She is oriented to person and place and recognized her  for the first time in several days today.  Both the patient and  are encouraged with her progress.  I will plan no medication changes at this point.

## (undated) DEVICE — GLV SURG SIGNATURE ESSENTIAL PF LTX SZ8

## (undated) DEVICE — DISPOSABLE IRRIGATION BIPOLAR CORD, M1000 TYPE: Brand: KIRWAN

## (undated) DEVICE — ANTIBACTERIAL UNDYED BRAIDED (POLYGLACTIN 910), SYNTHETIC ABSORBABLE SUTURE: Brand: COATED VICRYL

## (undated) DEVICE — VISUALIZATION SYSTEM: Brand: CLEARIFY

## (undated) DEVICE — STPCK 3WY D201 DISCOFIX

## (undated) DEVICE — SYR CORNRY CONTRL 10ML

## (undated) DEVICE — KT CATH TAL VENATRAC PALINDROM INSRT STY 23CM

## (undated) DEVICE — ADHS LIQ MASTISOL 2/3ML

## (undated) DEVICE — SOL ISO/ALC RUB 70PCT 4OZ

## (undated) DEVICE — Device

## (undated) DEVICE — TIDISHIELD UROLOGY DRAIN BAGS FROSTY VINYL STERILE FITS SIEMENS UROSKOP ACCESS 20 PER CASE: Brand: TIDISHIELD

## (undated) DEVICE — PK URETSCP 40

## (undated) DEVICE — GLV SURG SENSICARE PI MIC PF SZ7 LF STRL

## (undated) DEVICE — CATH IV INSYTE AUTOGARD 14G 1 1/2IN ORNG

## (undated) DEVICE — CVR PROB 96IN LF STRL

## (undated) DEVICE — GLV SURG BIOGEL M LTX PF 6 1/2

## (undated) DEVICE — NDL HYPO PRECISIONGLIDE REG 25G 1 1/2

## (undated) DEVICE — SYR LUERLOK 20CC BX/50

## (undated) DEVICE — SUT MNCRYL PLS ANTIB UD 4/0 PS2 18IN

## (undated) DEVICE — SUT VIC 3/0 CTI 36IN J944H

## (undated) DEVICE — GOWN,NON-REINFORCED,SIRUS,SET IN SLV,XXL: Brand: MEDLINE

## (undated) DEVICE — EXTENSION SET, MALE LUER LOCK ADAPTER WITH RETRACTABLE COLLAR

## (undated) DEVICE — BIOPATCH™ ANTIMICROBIAL DRESSING WITH CHLORHEXIDINE GLUCONATE IS A HYDROPHILLIC POLYURETHANE ABSORPTIVE FOAM WITH CHLORHEXIDINE GLUCONATE (CHG) WHICH INHIBITS BACTERIAL GROWTH UNDER THE DRESSING. THE DRESSING IS INTENDED TO BE USED TO ABSORB EXUDATE, COVER A WOUND CAUSED BY VASCULAR AND NONVASCULAR PERCUTANEOUS MEDICAL DEVICES DURING SURGERY, AS WELL AS REDUCE LOCAL INFECTION AND COLONIZATION OF MICROORGANISMS.: Brand: BIOPATCH

## (undated) DEVICE — DRAPE,REIN 53X77,STERILE: Brand: MEDLINE

## (undated) DEVICE — ENDOPATH XCEL BLADELESS TROCARS WITH STABILITY SLEEVES: Brand: ENDOPATH XCEL

## (undated) DEVICE — MEDICINE CUP, GRADUATED, STER: Brand: MEDLINE

## (undated) DEVICE — LOU CYSTO: Brand: MEDLINE INDUSTRIES, INC.

## (undated) DEVICE — GLV SURG BIOGEL LTX PF 7 1/2

## (undated) DEVICE — SYR CONTRL PRESS/LO FIX/M/LL W/THMB/RNG 10ML

## (undated) DEVICE — SYR LUERLOK 5CC

## (undated) DEVICE — PK HIP PINNING 40

## (undated) DEVICE — PK UNIV COMPL 40

## (undated) DEVICE — GLV SURG BIOGEL LTX PF 8 1/2

## (undated) DEVICE — LOU MINOR PROCEDURE: Brand: MEDLINE INDUSTRIES, INC.

## (undated) DEVICE — RADIFOCUS GLIDEWIRE: Brand: GLIDEWIRE

## (undated) DEVICE — ENDOPOUCH RETRIEVER SPECIMEN RETRIEVAL BAGS: Brand: ENDOPOUCH RETRIEVER

## (undated) DEVICE — SUT SILK 3/0 TIES 18IN A184H

## (undated) DEVICE — CATH URETRL FLXITP POLLACK STD 5F 70CM

## (undated) DEVICE — DEV ANGIO FLOSWITCH HP BX24

## (undated) DEVICE — SKIN PREP TRAY W/CHG: Brand: MEDLINE INDUSTRIES, INC.

## (undated) DEVICE — SYR MEDALLION SALINE PLUNGR/WHITE 10ML

## (undated) DEVICE — LOU LAP CHOLE: Brand: MEDLINE INDUSTRIES, INC.

## (undated) DEVICE — CONTRL DETACH AZUR HYDROCOIL SYS

## (undated) DEVICE — PENCL E/S ULTRAVAC TELESCP NOSE HOLSTR 10FT

## (undated) DEVICE — 3M™ STERI-STRIP™ COMPOUND BENZOIN TINCTURE 40 BAGS/CARTON 4 CARTONS/CASE C1544: Brand: 3M™ STERI-STRIP™

## (undated) DEVICE — TRAP FLD MINIVAC MEGADYNE 100ML

## (undated) DEVICE — CATH CHOLANG 4.5F18IN BRGNDY

## (undated) DEVICE — DECANT BG O JET

## (undated) DEVICE — SMOKE EVACUATION TUBING WITH 7/8 IN TO 1/4 IN REDUCER: Brand: BUFFALO FILTER

## (undated) DEVICE — TBG PENCL TELESCP MEGADYNE SMOKE EVAC 10FT

## (undated) DEVICE — ENDOPATH XCEL BLUNT TIP TROCARS WITH SMOOTH SLEEVES: Brand: ENDOPATH XCEL

## (undated) DEVICE — SOL NACL 0.9PCT 1000ML

## (undated) DEVICE — ST. SORBAVIEW ULTIMATE IJ SYSTEM A,C: Brand: CENTURION

## (undated) DEVICE — GLV SURG BIOGEL LTX PF 7

## (undated) DEVICE — 3M™ IOBAN™ 2 ANTIMICROBIAL INCISE DRAPE 6650EZ: Brand: IOBAN™ 2

## (undated) DEVICE — LAPAROSCOPIC SMOKE FILTRATION SYSTEM: Brand: PALL LAPAROSHIELD® PLUS LAPAROSCOPIC SMOKE FILTRATION SYSTEM

## (undated) DEVICE — ST ACC MICROPUNCTURE STFF .018 ECHO/PLDM/TP 4F/10CM 21G/7CM

## (undated) DEVICE — SUT ETHLN 2/0 PS 18IN 585H

## (undated) DEVICE — ENDOCUT SCISSOR TIP, DISPOSABLE: Brand: RENEW

## (undated) DEVICE — APPL CHLORAPREP HI/LITE 26ML ORNG

## (undated) DEVICE — SYR SLP TP 10ML DISP

## (undated) DEVICE — DRSNG TELFA PAD NONADH STR 1S 3X4IN

## (undated) DEVICE — 1000 S-DRAPE TOWEL DRAPE 10/BX: Brand: STERI-DRAPE™

## (undated) DEVICE — GW CERBRL JB PTFE .035IN 20X180CM

## (undated) DEVICE — GLV SURG BIOGEL LTX PF 8

## (undated) DEVICE — TOWEL,OR,DSP,ST,BLUE,STD,4/PK,20PK/CS: Brand: MEDLINE

## (undated) DEVICE — PINNACLE INTRODUCER SHEATH: Brand: PINNACLE

## (undated) DEVICE — ANGIO-SEAL VIP VASCULAR CLOSURE DEVICE: Brand: ANGIO-SEAL

## (undated) DEVICE — PROGREAT MICROCATHETER COAXIAL SYSTEM: Brand: PROGREAT

## (undated) DEVICE — NITINOL WIRE WITH HYDROPHILIC TIP: Brand: SENSOR

## (undated) DEVICE — MAT FLR ABSORBENT LG 4FT 10 2.5FT

## (undated) DEVICE — BLD TONG INDIV/WRP A/ 6IN STRL

## (undated) DEVICE — GLV SURG PREMIERPRO ORTHO LTX PF SZ8 BRN

## (undated) DEVICE — STCKNT IMPERV 9X36IN STRL

## (undated) DEVICE — SYR MEDALLION CONTRAST PLUNGR/YEL 10CC

## (undated) DEVICE — KT CATH TAL PALINDROME SAPPHIRE 14.5F23CM

## (undated) DEVICE — PATIENT RETURN ELECTRODE, SINGLE-USE, CONTACT QUALITY MONITORING, ADULT, WITH 9FT CORD, FOR PATIENTS WEIGING OVER 33LBS. (15KG): Brand: MEGADYNE

## (undated) DEVICE — ADHS SKIN SURG TISS VISC PREMIERPRO EXOFIN HI/VISC FAST/DRY

## (undated) DEVICE — SYR MEDALLION LL PLUNGR/YEL 3CC

## (undated) DEVICE — 1000ML,PRESSURE INFUSER W/STOPCOCK: Brand: MEDLINE

## (undated) DEVICE — PK ANGIO 40

## (undated) DEVICE — SYR LL TP 10ML STRL

## (undated) DEVICE — CONTAINER,SPECIMEN,OR STERILE,4OZ: Brand: MEDLINE

## (undated) DEVICE — STPLR SKIN VISISTAT WD 35CT

## (undated) DEVICE — SYRINGE KIT,PACKAGED,,150FT,MK 7(ANGIO-ARTERION, 150ML SYR KIT W/QFT,MC)(60729385): Brand: MEDRAD® MARK 7 ARTERION DISPOSABLE SYRINGE 150 ML WITH QUICK FILL TUBE

## (undated) DEVICE — CONN TBG Y 5 IN 1 LF STRL

## (undated) DEVICE — GOWN,REINF,POLY,SIRUS,BRTH SLV,XLNG/XXL: Brand: MEDLINE

## (undated) DEVICE — CATH URETRL PA CONE TP 8F

## (undated) DEVICE — T-DRAPE,EXTREMITY,STERILE: Brand: MEDLINE

## (undated) DEVICE — GLV SURG SENSICARE W/ALOE PF LF 7.5 STRL

## (undated) DEVICE — DRSNG WND SIL OPTIFOAM GENTLE BRDR ADHS W/SA 4X4IN

## (undated) DEVICE — VIOLET BRAIDED (POLYGLACTIN 910), SYNTHETIC ABSORBABLE SUTURE: Brand: COATED VICRYL

## (undated) DEVICE — LAPAROSCOPIC DISSECTOR: Brand: DEROYAL

## (undated) DEVICE — INTENDED FOR TISSUE SEPARATION, AND OTHER PROCEDURES THAT REQUIRE A SHARP SURGICAL BLADE TO PUNCTURE OR CUT.: Brand: BARD-PARKER ® STAINLESS STEEL BLADES

## (undated) DEVICE — 3M™ STERI-STRIP™ ANTIMICROBIAL SKIN CLOSURES 1/2 INCH X 4 INCHES 50/CARTON 4 CARTONS/CASE A1847: Brand: 3M™ STERI-STRIP™

## (undated) DEVICE — RADIFOCUS TORQUE DEVICE MULTI-TORQUE VISE: Brand: RADIFOCUS TORQUE DEVICE

## (undated) DEVICE — BNDG ELAS CO-FLEX SLF ADHR 4IN5YD LF STRL

## (undated) DEVICE — ENDOPATH XCEL UNIVERSAL TROCAR STABLILITY SLEEVES: Brand: ENDOPATH XCEL

## (undated) DEVICE — DRP C/ARM 41X74IN

## (undated) DEVICE — LOCKING DRILL

## (undated) DEVICE — STPCK 3/WY HP M/RA W/OFF/HNDL 1050PSI STRL